# Patient Record
Sex: FEMALE | Race: BLACK OR AFRICAN AMERICAN | NOT HISPANIC OR LATINO | Employment: OTHER | URBAN - METROPOLITAN AREA
[De-identification: names, ages, dates, MRNs, and addresses within clinical notes are randomized per-mention and may not be internally consistent; named-entity substitution may affect disease eponyms.]

---

## 2017-01-06 ENCOUNTER — TELEPHONE (OUTPATIENT)
Dept: PAIN MEDICINE | Facility: CLINIC | Age: 57
End: 2017-01-06

## 2017-01-06 ENCOUNTER — OFFICE VISIT (OUTPATIENT)
Dept: INTERNAL MEDICINE | Facility: CLINIC | Age: 57
End: 2017-01-06
Payer: COMMERCIAL

## 2017-01-06 ENCOUNTER — TELEPHONE (OUTPATIENT)
Dept: INTERNAL MEDICINE | Facility: CLINIC | Age: 57
End: 2017-01-06

## 2017-01-06 VITALS
HEART RATE: 90 BPM | TEMPERATURE: 99 F | SYSTOLIC BLOOD PRESSURE: 142 MMHG | BODY MASS INDEX: 27.85 KG/M2 | WEIGHT: 163.13 LBS | HEIGHT: 64 IN | DIASTOLIC BLOOD PRESSURE: 80 MMHG

## 2017-01-06 DIAGNOSIS — R52 BODY ACHES: ICD-10-CM

## 2017-01-06 DIAGNOSIS — J32.9 SINUSITIS, UNSPECIFIED CHRONICITY, UNSPECIFIED LOCATION: Primary | ICD-10-CM

## 2017-01-06 LAB
FLUAV AG SPEC QL IA: NEGATIVE
FLUBV AG SPEC QL IA: NEGATIVE
SPECIMEN SOURCE: NORMAL

## 2017-01-06 PROCEDURE — 3079F DIAST BP 80-89 MM HG: CPT | Mod: S$GLB,,, | Performed by: INTERNAL MEDICINE

## 2017-01-06 PROCEDURE — 99999 PR PBB SHADOW E&M-EST. PATIENT-LVL III: CPT | Mod: PBBFAC,,, | Performed by: INTERNAL MEDICINE

## 2017-01-06 PROCEDURE — 99213 OFFICE O/P EST LOW 20 MIN: CPT | Mod: S$GLB,,, | Performed by: INTERNAL MEDICINE

## 2017-01-06 PROCEDURE — 87400 INFLUENZA A/B EACH AG IA: CPT | Mod: 59,PO

## 2017-01-06 PROCEDURE — 3077F SYST BP >= 140 MM HG: CPT | Mod: S$GLB,,, | Performed by: INTERNAL MEDICINE

## 2017-01-06 PROCEDURE — 1159F MED LIST DOCD IN RCRD: CPT | Mod: S$GLB,,, | Performed by: INTERNAL MEDICINE

## 2017-01-06 RX ORDER — ZOLPIDEM TARTRATE 5 MG/1
5 TABLET ORAL NIGHTLY PRN
Qty: 30 TABLET | Refills: 2 | Status: SHIPPED | OUTPATIENT
Start: 2017-01-06 | End: 2017-08-15 | Stop reason: SDUPTHER

## 2017-01-06 RX ORDER — NITROGLYCERIN 0.4 MG/1
TABLET SUBLINGUAL
Qty: 30 TABLET | Refills: 11 | Status: SHIPPED | OUTPATIENT
Start: 2017-01-06 | End: 2021-05-18

## 2017-01-06 NOTE — TELEPHONE ENCOUNTER
----- Message from Grecia Keenan sent at 1/6/2017  8:09 AM CST -----  _  1st Request  _  2nd Request  _  3rd Request        Who: Mariann Huff    Why: pt is calling she has to reschedule the procedure this morning , she woke up feeling like she has the flu    What Number to Call Back:[phone]    When to Expect a call back: (Before the end of the day)   -- if call after 3:00 call back will be tomorrow.

## 2017-01-06 NOTE — PROGRESS NOTES
Subjective:       Patient ID: Mariann Huff is a 56 y.o. female.    Chief Complaint: Cough and Generalized Body Aches    HPI     56-year-old female here for evaluation of cough and body aches.  She has a sore body.  She was supposed to have the pain management injection, but was hurting too bad.  She has a dry cough.  This has been going on since yesterday.  The body aches started yesterday as well.  She has a headache over the left eye.  She had a sinus surgery recently.  She has no sinus pressure.  No post nasal drip or rhinorrhea.  She is sneezing.  She has left sided whistling noise in her left ear.  She has an echo in her ear that started Monday.    Review of Systems    Objective:      Physical Exam   Constitutional: She is oriented to person, place, and time. She appears well-developed and well-nourished.   HENT:   Head: Normocephalic and atraumatic.   Mouth/Throat: No oropharyngeal exudate.   Eyes: EOM are normal. Pupils are equal, round, and reactive to light. Right eye exhibits no discharge. Left eye exhibits no discharge. No scleral icterus.   Neck: Normal range of motion. Neck supple. No tracheal deviation present. No thyromegaly present.   Cardiovascular: Normal rate, regular rhythm and normal heart sounds.  Exam reveals no gallop and no friction rub.    No murmur heard.  Pulmonary/Chest: Effort normal and breath sounds normal. No respiratory distress. She has no wheezes. She has no rales. She exhibits no tenderness.   Abdominal: Soft. Bowel sounds are normal. She exhibits no distension and no mass. There is no tenderness. There is no rebound and no guarding.   Musculoskeletal: Normal range of motion. She exhibits no edema or tenderness.   Neurological: She is alert and oriented to person, place, and time.   Skin: Skin is warm and dry. No rash noted. No erythema. No pallor.   Psychiatric: She has a normal mood and affect. Her behavior is normal.   Vitals reviewed.      Assessment:       1. Sinusitis,  unspecified chronicity, unspecified location    2. Body aches        Plan:       1.  Likely viral or ALLERGIC.  Recommend Flonase, over-the-counter antihistamine.  If patient worsens or Thursday of next week or no better by Monday the following week, call back for an antibiotic.  Would prescribe Augmentin.  2.  Check flu swab stat.

## 2017-02-13 ENCOUNTER — OFFICE VISIT (OUTPATIENT)
Dept: INTERNAL MEDICINE | Facility: CLINIC | Age: 57
End: 2017-02-13
Payer: COMMERCIAL

## 2017-02-13 ENCOUNTER — HOSPITAL ENCOUNTER (OUTPATIENT)
Dept: RADIOLOGY | Facility: HOSPITAL | Age: 57
Discharge: HOME | End: 2017-02-13
Attending: INTERNAL MEDICINE
Payer: COMMERCIAL

## 2017-02-13 VITALS
SYSTOLIC BLOOD PRESSURE: 130 MMHG | HEART RATE: 80 BPM | DIASTOLIC BLOOD PRESSURE: 80 MMHG | HEIGHT: 64 IN | BODY MASS INDEX: 28.34 KG/M2 | WEIGHT: 166 LBS | RESPIRATION RATE: 10 BRPM

## 2017-02-13 DIAGNOSIS — L89.510: ICD-10-CM

## 2017-02-13 DIAGNOSIS — E11.51 TYPE 2 DIABETES MELLITUS WITH DIABETIC PERIPHERAL ANGIOPATHY WITHOUT GANGRENE, WITH LONG-TERM CURRENT USE OF INSULIN: ICD-10-CM

## 2017-02-13 DIAGNOSIS — R10.11 RUQ PAIN: Primary | ICD-10-CM

## 2017-02-13 DIAGNOSIS — Z79.4 TYPE 2 DIABETES MELLITUS WITH DIABETIC PERIPHERAL ANGIOPATHY WITHOUT GANGRENE, WITH LONG-TERM CURRENT USE OF INSULIN: ICD-10-CM

## 2017-02-13 DIAGNOSIS — K22.2 SCHATZKI'S RING: ICD-10-CM

## 2017-02-13 PROCEDURE — 3079F DIAST BP 80-89 MM HG: CPT | Mod: S$GLB,,, | Performed by: INTERNAL MEDICINE

## 2017-02-13 PROCEDURE — 73610 X-RAY EXAM OF ANKLE: CPT | Mod: TC,PO,RT

## 2017-02-13 PROCEDURE — 3075F SYST BP GE 130 - 139MM HG: CPT | Mod: S$GLB,,, | Performed by: INTERNAL MEDICINE

## 2017-02-13 PROCEDURE — 3046F HEMOGLOBIN A1C LEVEL >9.0%: CPT | Mod: S$GLB,,, | Performed by: INTERNAL MEDICINE

## 2017-02-13 PROCEDURE — 4010F ACE/ARB THERAPY RXD/TAKEN: CPT | Mod: S$GLB,,, | Performed by: INTERNAL MEDICINE

## 2017-02-13 PROCEDURE — 73610 X-RAY EXAM OF ANKLE: CPT | Mod: 26,RT,, | Performed by: RADIOLOGY

## 2017-02-13 PROCEDURE — 99214 OFFICE O/P EST MOD 30 MIN: CPT | Mod: S$GLB,,, | Performed by: INTERNAL MEDICINE

## 2017-02-13 PROCEDURE — 2022F DILAT RTA XM EVC RTNOPTHY: CPT | Mod: S$GLB,,, | Performed by: INTERNAL MEDICINE

## 2017-02-13 PROCEDURE — 99999 PR PBB SHADOW E&M-EST. PATIENT-LVL III: CPT | Mod: PBBFAC,,, | Performed by: INTERNAL MEDICINE

## 2017-02-13 RX ORDER — CLINDAMYCIN HYDROCHLORIDE 300 MG/1
300 CAPSULE ORAL 3 TIMES DAILY
Qty: 42 CAPSULE | Refills: 0 | Status: SHIPPED | OUTPATIENT
Start: 2017-02-13 | End: 2017-02-27

## 2017-02-13 NOTE — PROGRESS NOTES
Subjective:       Patient ID: Mariann Huff is a 56 y.o. female.    Chief Complaint: Abdominal Pain and Ankle Pain    HPI     56-year-old female here for evaluation of a stomach ache and painful sore on her right ankle.  Her stomach has been bothering her.  She has pins sticking in it and burning sensation.  This has been going on for 2 weeks now.  This is worse when she eats.  When she eats more greasy and fried food, she has increase in pain.  She gets bloated when she eats.  She gets nausea as well.  She is on nexium 40 mg.  She had an endoscopy which showed a schatzki ring that was incompletely dilated.    Right ankle sore.  It started as a little wound and has spread.  It is on her lateral ankle.  This is painful.  She cannot put it down when she is laying in bed.  She recalls no trauma.  Her ankle started getting painful January 5th.    Review of Systems    Objective:      Physical Exam   Constitutional: She is oriented to person, place, and time. She appears well-developed and well-nourished.   HENT:   Head: Normocephalic and atraumatic.   Mouth/Throat: No oropharyngeal exudate.   Eyes: EOM are normal. Pupils are equal, round, and reactive to light. Right eye exhibits no discharge. Left eye exhibits no discharge. No scleral icterus.   Neck: Normal range of motion. Neck supple. No tracheal deviation present. No thyromegaly present.   Cardiovascular: Normal rate, regular rhythm and normal heart sounds.  Exam reveals no gallop and no friction rub.    No murmur heard.  Pulmonary/Chest: Effort normal and breath sounds normal. No respiratory distress. She has no wheezes. She has no rales. She exhibits no tenderness.   Abdominal: Soft. Bowel sounds are normal. She exhibits no distension and no mass. There is no tenderness. There is no rebound and no guarding.   Musculoskeletal: Normal range of motion. She exhibits no edema or tenderness.   Neurological: She is alert and oriented to person, place, and time.    Skin: Skin is warm and dry. No rash noted. No erythema. No pallor.        Psychiatric: She has a normal mood and affect. Her behavior is normal.   Vitals reviewed.      Assessment:       1. RUQ pain    2. Pressure sore on ankle, right, unstageable    3. Type 2 diabetes mellitus with diabetic peripheral angiopathy without gangrene, with long-term current use of insulin    4. Schatzki's ring        Plan:       1.  Check ultrasound right upper quadrant.  2.  Check CBC, ESR, CRP, x-ray of right ankle.  Clindamycin 300 mg 3 times a day ×14 days prescribed.  3.  Affected decision-making process and #2.  4.  Refer for EGD repeat

## 2017-02-14 ENCOUNTER — HOSPITAL ENCOUNTER (OUTPATIENT)
Dept: RADIOLOGY | Facility: HOSPITAL | Age: 57
Discharge: HOME | End: 2017-02-14
Attending: INTERNAL MEDICINE
Payer: COMMERCIAL

## 2017-02-14 ENCOUNTER — TELEPHONE (OUTPATIENT)
Dept: INTERNAL MEDICINE | Facility: CLINIC | Age: 57
End: 2017-02-14

## 2017-02-14 DIAGNOSIS — L03.90 CELLULITIS, UNSPECIFIED CELLULITIS SITE: Primary | ICD-10-CM

## 2017-02-14 DIAGNOSIS — R10.11 RUQ PAIN: ICD-10-CM

## 2017-02-14 PROCEDURE — 76705 ECHO EXAM OF ABDOMEN: CPT | Mod: 26,,, | Performed by: RADIOLOGY

## 2017-02-14 PROCEDURE — 76705 ECHO EXAM OF ABDOMEN: CPT | Mod: TC

## 2017-02-14 NOTE — TELEPHONE ENCOUNTER
Blood counts are normal.  Inflammatory markers are elevated as well.  Patient may take the antibiotics as prescribed.  Need to recheck labs in a couple weeks.

## 2017-02-16 ENCOUNTER — TELEPHONE (OUTPATIENT)
Dept: INTERNAL MEDICINE | Facility: CLINIC | Age: 57
End: 2017-02-16

## 2017-02-16 NOTE — TELEPHONE ENCOUNTER
----- Message from Jasbir Haney MD sent at 2/14/2017  9:55 AM CST -----  Blood counts are normal.  Inflammatory markers are elevated as well.  Patient may take the antibiotics as prescribed.  Need to recheck labs in a couple weeks.

## 2017-02-16 NOTE — TELEPHONE ENCOUNTER
----- Message from Jasbir Haney MD sent at 2/13/2017  3:42 PM CST -----  No radiographic signs of ostium myelitis.  Awaiting blood work.

## 2017-02-16 NOTE — TELEPHONE ENCOUNTER
----- Message from Jasbir Haney MD sent at 2/14/2017  9:54 AM CST -----  No gallstones identified.  If patient is still having this abdominal pain, I would want to get a HIDA scan to see if there are stones.

## 2017-02-27 ENCOUNTER — LAB VISIT (OUTPATIENT)
Dept: LAB | Facility: HOSPITAL | Age: 57
End: 2017-02-27
Attending: INTERNAL MEDICINE
Payer: COMMERCIAL

## 2017-02-27 ENCOUNTER — TELEPHONE (OUTPATIENT)
Dept: CARDIOLOGY | Facility: CLINIC | Age: 57
End: 2017-02-27

## 2017-02-27 DIAGNOSIS — I10 HTN (HYPERTENSION), BENIGN: Chronic | ICD-10-CM

## 2017-02-27 DIAGNOSIS — E78.5 HYPERLIPIDEMIA: Chronic | ICD-10-CM

## 2017-02-27 DIAGNOSIS — E11.51 TYPE 2 DIABETES MELLITUS WITH DIABETIC PERIPHERAL ANGIOPATHY WITHOUT GANGRENE: ICD-10-CM

## 2017-02-27 DIAGNOSIS — E11.42 DIABETIC PERIPHERAL NEUROPATHY ASSOCIATED WITH TYPE 2 DIABETES MELLITUS: ICD-10-CM

## 2017-02-27 LAB
ALBUMIN SERPL BCP-MCNC: 3.5 G/DL
ALP SERPL-CCNC: 79 U/L
ALT SERPL W/O P-5'-P-CCNC: 19 U/L
ANION GAP SERPL CALC-SCNC: 9 MMOL/L
AST SERPL-CCNC: 17 U/L
BILIRUB SERPL-MCNC: 0.5 MG/DL
BUN SERPL-MCNC: 14 MG/DL
CALCIUM SERPL-MCNC: 9.7 MG/DL
CHLORIDE SERPL-SCNC: 106 MMOL/L
CHOLEST/HDLC SERPL: 4.8 {RATIO}
CO2 SERPL-SCNC: 26 MMOL/L
CREAT SERPL-MCNC: 1 MG/DL
EST. GFR  (AFRICAN AMERICAN): >60 ML/MIN/1.73 M^2
EST. GFR  (NON AFRICAN AMERICAN): >60 ML/MIN/1.73 M^2
ESTIMATED AVG GLUCOSE: 249 MG/DL
GLUCOSE SERPL-MCNC: 197 MG/DL
HBA1C MFR BLD HPLC: 10.3 %
HDL/CHOLESTEROL RATIO: 20.9 %
HDLC SERPL-MCNC: 191 MG/DL
HDLC SERPL-MCNC: 40 MG/DL
LDLC SERPL CALC-MCNC: 123.8 MG/DL
NONHDLC SERPL-MCNC: 151 MG/DL
POTASSIUM SERPL-SCNC: 3.9 MMOL/L
PROT SERPL-MCNC: 7.2 G/DL
SODIUM SERPL-SCNC: 141 MMOL/L
TRIGL SERPL-MCNC: 136 MG/DL
TSH SERPL DL<=0.005 MIU/L-ACNC: 1.66 UIU/ML

## 2017-02-27 PROCEDURE — 80053 COMPREHEN METABOLIC PANEL: CPT

## 2017-02-27 PROCEDURE — 80061 LIPID PANEL: CPT

## 2017-02-27 PROCEDURE — 36415 COLL VENOUS BLD VENIPUNCTURE: CPT | Mod: PO

## 2017-02-27 PROCEDURE — 83036 HEMOGLOBIN GLYCOSYLATED A1C: CPT

## 2017-02-27 PROCEDURE — 84443 ASSAY THYROID STIM HORMONE: CPT

## 2017-02-27 RX ORDER — CANAGLIFLOZIN 300 MG/1
TABLET, FILM COATED ORAL
Qty: 30 TABLET | Refills: 0 | Status: SHIPPED | OUTPATIENT
Start: 2017-02-27 | End: 2017-03-06 | Stop reason: SDUPTHER

## 2017-02-27 NOTE — TELEPHONE ENCOUNTER
----- Message from Adelaide Aguilar MD sent at 2/27/2017  2:53 PM CST -----  Please mail patient the blood work results and inform the patient that we will discuss it in detail during the upcoming visit. Lipids look better, but diabetes is still not controlled and has not changed.

## 2017-03-06 ENCOUNTER — OFFICE VISIT (OUTPATIENT)
Dept: CARDIOLOGY | Facility: CLINIC | Age: 57
End: 2017-03-06
Payer: COMMERCIAL

## 2017-03-06 ENCOUNTER — LAB VISIT (OUTPATIENT)
Dept: LAB | Facility: HOSPITAL | Age: 57
End: 2017-03-06
Attending: INTERNAL MEDICINE
Payer: COMMERCIAL

## 2017-03-06 VITALS
BODY MASS INDEX: 28.32 KG/M2 | HEART RATE: 72 BPM | HEIGHT: 64 IN | SYSTOLIC BLOOD PRESSURE: 140 MMHG | DIASTOLIC BLOOD PRESSURE: 78 MMHG | WEIGHT: 165.88 LBS

## 2017-03-06 DIAGNOSIS — E66.09 NON MORBID OBESITY DUE TO EXCESS CALORIES: Chronic | ICD-10-CM

## 2017-03-06 DIAGNOSIS — I25.83 CORONARY ARTERY DISEASE DUE TO LIPID RICH PLAQUE: Primary | Chronic | ICD-10-CM

## 2017-03-06 DIAGNOSIS — I10 HTN (HYPERTENSION), BENIGN: Chronic | ICD-10-CM

## 2017-03-06 DIAGNOSIS — E78.2 MIXED HYPERLIPIDEMIA: Chronic | ICD-10-CM

## 2017-03-06 DIAGNOSIS — G47.30 SLEEP APNEA, UNSPECIFIED TYPE: Chronic | ICD-10-CM

## 2017-03-06 DIAGNOSIS — E11.42 DIABETIC PERIPHERAL NEUROPATHY ASSOCIATED WITH TYPE 2 DIABETES MELLITUS: ICD-10-CM

## 2017-03-06 DIAGNOSIS — I65.23 CAROTID STENOSIS, BILATERAL: Chronic | ICD-10-CM

## 2017-03-06 DIAGNOSIS — Z91.199 NON COMPLIANCE WITH MEDICAL TREATMENT: Chronic | ICD-10-CM

## 2017-03-06 DIAGNOSIS — I25.10 CORONARY ARTERY DISEASE DUE TO LIPID RICH PLAQUE: Primary | Chronic | ICD-10-CM

## 2017-03-06 DIAGNOSIS — T82.855S CORONARY STENT RESTENOSIS, SEQUELA: ICD-10-CM

## 2017-03-06 DIAGNOSIS — L03.90 CELLULITIS, UNSPECIFIED CELLULITIS SITE: ICD-10-CM

## 2017-03-06 LAB
BASOPHILS # BLD AUTO: 0.01 K/UL
BASOPHILS NFR BLD: 0.1 %
CRP SERPL-MCNC: 9.8 MG/L
DIFFERENTIAL METHOD: ABNORMAL
EOSINOPHIL # BLD AUTO: 0.2 K/UL
EOSINOPHIL NFR BLD: 2.5 %
ERYTHROCYTE [DISTWIDTH] IN BLOOD BY AUTOMATED COUNT: 13.9 %
ERYTHROCYTE [SEDIMENTATION RATE] IN BLOOD BY WESTERGREN METHOD: 21 MM/HR
HCT VFR BLD AUTO: 36.3 %
HGB BLD-MCNC: 11.2 G/DL
LYMPHOCYTES # BLD AUTO: 2.9 K/UL
LYMPHOCYTES NFR BLD: 36.1 %
MCH RBC QN AUTO: 25.5 PG
MCHC RBC AUTO-ENTMCNC: 30.9 %
MCV RBC AUTO: 83 FL
MONOCYTES # BLD AUTO: 0.6 K/UL
MONOCYTES NFR BLD: 7.7 %
NEUTROPHILS # BLD AUTO: 4.3 K/UL
NEUTROPHILS NFR BLD: 53.4 %
PLATELET # BLD AUTO: 305 K/UL
PMV BLD AUTO: 11.4 FL
RBC # BLD AUTO: 4.39 M/UL
WBC # BLD AUTO: 8.07 K/UL

## 2017-03-06 PROCEDURE — 99999 PR PBB SHADOW E&M-EST. PATIENT-LVL III: CPT | Mod: PBBFAC,,, | Performed by: INTERNAL MEDICINE

## 2017-03-06 PROCEDURE — 85651 RBC SED RATE NONAUTOMATED: CPT

## 2017-03-06 PROCEDURE — 36415 COLL VENOUS BLD VENIPUNCTURE: CPT | Mod: PO

## 2017-03-06 PROCEDURE — 4010F ACE/ARB THERAPY RXD/TAKEN: CPT | Mod: S$GLB,,, | Performed by: INTERNAL MEDICINE

## 2017-03-06 PROCEDURE — 85025 COMPLETE CBC W/AUTO DIFF WBC: CPT

## 2017-03-06 PROCEDURE — 3078F DIAST BP <80 MM HG: CPT | Mod: S$GLB,,, | Performed by: INTERNAL MEDICINE

## 2017-03-06 PROCEDURE — 3046F HEMOGLOBIN A1C LEVEL >9.0%: CPT | Mod: S$GLB,,, | Performed by: INTERNAL MEDICINE

## 2017-03-06 PROCEDURE — 99214 OFFICE O/P EST MOD 30 MIN: CPT | Mod: S$GLB,,, | Performed by: INTERNAL MEDICINE

## 2017-03-06 PROCEDURE — 2022F DILAT RTA XM EVC RTNOPTHY: CPT | Mod: S$GLB,,, | Performed by: INTERNAL MEDICINE

## 2017-03-06 PROCEDURE — 3077F SYST BP >= 140 MM HG: CPT | Mod: S$GLB,,, | Performed by: INTERNAL MEDICINE

## 2017-03-06 PROCEDURE — 86140 C-REACTIVE PROTEIN: CPT

## 2017-03-06 PROCEDURE — 1160F RVW MEDS BY RX/DR IN RCRD: CPT | Mod: S$GLB,,, | Performed by: INTERNAL MEDICINE

## 2017-03-06 RX ORDER — BLOOD-GLUCOSE CONTROL, NORMAL
EACH MISCELLANEOUS
Status: ON HOLD | COMMUNITY
Start: 2017-01-09 | End: 2019-07-02 | Stop reason: HOSPADM

## 2017-03-06 RX ORDER — PEN NEEDLE, DIABETIC 31 GX3/16"
NEEDLE, DISPOSABLE MISCELLANEOUS
COMMUNITY
Start: 2017-02-24 | End: 2018-10-26

## 2017-03-06 NOTE — PROGRESS NOTES
Subjective:   Patient ID:  Mariann Huff is a 56 y.o. female who presents for follow-up of Hypertension      HPI: Patient has no specific complaints.  She is exercising on the treadmill 2 times 15 minutes a day. She admits to dietary indiscretions. She is unable to loose charly. Diabetes is uncontrolled.    Lab Results   Component Value Date     02/27/2017    K 3.9 02/27/2017     02/27/2017    CO2 26 02/27/2017    BUN 14 02/27/2017    CREATININE 1.0 02/27/2017     (H) 02/27/2017    HGBA1C 10.3 (H) 02/27/2017    MG 2.6 10/09/2015    AST 17 02/27/2017    ALT 19 02/27/2017    ALBUMIN 3.5 02/27/2017    PROT 7.2 02/27/2017    BILITOT 0.5 02/27/2017    WBC 7.87 02/13/2017    HGB 11.7 (L) 02/13/2017    HCT 36.7 (L) 02/13/2017    MCV 82 02/13/2017     02/13/2017    INR 0.9 02/17/2014    TSH 1.656 02/27/2017    CHOL 191 02/27/2017    HDL 40 02/27/2017    LDLCALC 123.8 02/27/2017    TRIG 136 02/27/2017       Review of Systems   Constitution: Positive for malaise/fatigue.   HENT: Negative.    Eyes: Negative.    Cardiovascular: Positive for dyspnea on exertion. Negative for chest pain, claudication, irregular heartbeat, leg swelling, near-syncope, palpitations and syncope.   Respiratory: Negative.  Negative for cough, shortness of breath, snoring and wheezing.    Endocrine: Negative.  Negative for cold intolerance, heat intolerance, polydipsia, polyphagia and polyuria.   Skin: Negative.    Musculoskeletal: Positive for arthritis, back pain, muscle cramps and muscle weakness.   Gastrointestinal: Negative.    Genitourinary: Negative.    Neurological: Positive for numbness and paresthesias.   Psychiatric/Behavioral: Negative.         Current Outpatient Prescriptions:     ACCU-CHEK EDIN PLUS METER Misc, , Disp: , Rfl:     amlodipine (NORVASC) 10 MG tablet, TAKE 1 TABLET (10 MG TOTAL) BY MOUTH ONCE DAILY., Disp: 30 tablet, Rfl: 11    aspirin 81 mg Tab, Take 81 mg by mouth. 1 Tablet Oral Every day,  "Disp: , Rfl:     atorvastatin (LIPITOR) 40 MG tablet, TAKE 1 TABLET (40 MG TOTAL) BY MOUTH ONCE DAILY., Disp: 30 tablet, Rfl: 11    BD INSULIN PEN NEEDLE UF MINI 31 x 3/16 " Ndle, USE 4 TIMES A DAY, Disp: 200 each, Rfl: 11    blood sugar diagnostic (ACCU-CHEK EDIN PLUS TEST STRP) Strp, TEST BLOOD SUGARS 4 TIMES DAILY, Disp: 150 strip, Rfl: 11    EFFIENT 10 mg Tab, TAKE 1 TABLET (10 MG TOTAL) BY MOUTH ONCE DAILY., Disp: 30 tablet, Rfl: 11    esomeprazole (NEXIUM) 40 MG capsule, Take 1 capsule (40 mg total) by mouth before breakfast., Disp: 30 capsule, Rfl: 2    fenofibrate micronized (LOFIBRA) 134 MG Cap, TAKE 1 CAPSULE (134 MG TOTAL) BY MOUTH BEFORE BREAKFAST., Disp: 30 capsule, Rfl: 10    insulin glargine, TOUJEO, (TOUJEO SOLOSTAR) 300 unit/mL (1.5 mL) InPn pen, Inject 84 Units into the skin once daily., Disp: 6 Syringe, Rfl: 11    insulin lispro (HUMALOG KWIKPEN) 100 unit/mL InPn pen, 30 U in am, 40 units noon, 30 units pm; 150-200 +2, 201-250 + 4, 251-300 + 6, 301-350 +8, >350 +10 and call MD, Disp: 2 Box, Rfl: 11    INVOKANA 300 mg Tab, Take 1 tablet by mouth once daily., Disp: 30 tablet, Rfl: 11    lancets 30 gauge Misc, , Disp: , Rfl:     losartan (COZAAR) 100 MG tablet, Take 1 tablet (100 mg total) by mouth once daily., Disp: 30 tablet, Rfl: 11    metoprolol succinate (TOPROL-XL) 100 MG 24 hr tablet, TAKE 1 TABLET (100 MG TOTAL) BY MOUTH ONCE DAILY., Disp: 30 tablet, Rfl: 11    nitroGLYCERIN (NITROSTAT) 0.4 MG SL tablet, Take one every 2-3 min till chestpain relief for 3 times and if still no relief, call MD or come to ED, Disp: 30 tablet, Rfl: 11    omega-3 acid ethyl esters (LOVAZA) 1 gram capsule, Take 1 g by mouth once daily. , Disp: , Rfl:     pen needle, diabetic (BD ULTRA-FINE GRABIEL PEN NEEDLES) 32 gauge x 5/32" Ndle, For use with insulin pens daily 4 times a day, Disp: 100 each, Rfl: 11    SURE COMFORT PEN NEEDLE 31 gauge x 5/16" Ndle, , Disp: , Rfl:     tramadol (ULTRAM) 50 mg " "tablet, Take 1 tablet (50 mg total) by mouth 3 (three) times daily as needed for Pain., Disp: 60 tablet, Rfl: 1    zolpidem (AMBIEN) 5 MG Tab, Take 1 tablet (5 mg total) by mouth nightly as needed., Disp: 30 tablet, Rfl: 2    Objective:   Physical Exam   Constitutional: She is oriented to person, place, and time. She appears well-developed and well-nourished.   BP (!) 140/78  Pulse 72  Ht 5' 4" (1.626 m)  Wt 75.3 kg (165 lb 14.3 oz)  BMI 28.48 kg/m2     HENT:   Head: Normocephalic.   Eyes: Pupils are equal, round, and reactive to light.   Neck: Normal range of motion. Neck supple. Carotid bruit is not present. Thyromegaly present.   Cardiovascular: Normal rate, regular rhythm, normal heart sounds and intact distal pulses.  Exam reveals no gallop and no friction rub.    No murmur heard.  Pulses:       Carotid pulses are 2+ on the right side, and 2+ on the left side.       Radial pulses are 2+ on the right side, and 2+ on the left side.        Femoral pulses are 2+ on the right side, and 2+ on the left side.       Popliteal pulses are 2+ on the right side, and 2+ on the left side.        Dorsalis pedis pulses are 2+ on the right side, and 2+ on the left side.        Posterior tibial pulses are 2+ on the right side, and 2+ on the left side.   Pulmonary/Chest: Effort normal and breath sounds normal. No respiratory distress. She has no wheezes. She has no rales. She exhibits no tenderness.   Abdominal: Soft. Bowel sounds are normal.   obese   Musculoskeletal: Normal range of motion. She exhibits no edema.   Neurological: She is alert and oriented to person, place, and time.   Skin: Skin is warm and dry.   Psychiatric: She has a normal mood and affect.   Nursing note and vitals reviewed.      Assessment and Plan:     Problem List Items Addressed This Visit        Cardiology Problems    HTN (hypertension), benign (Chronic)    Hyperlipidemia (Chronic)    Relevant Orders    Hemoglobin A1c    TSH    Comprehensive " metabolic panel    Lipid panel    CAD (coronary artery disease) - Primary (Chronic)    Relevant Orders    Hemoglobin A1c    TSH    Comprehensive metabolic panel    Lipid panel    Carotid stenosis, bilateral (Chronic)    Relevant Orders    Hemoglobin A1c    TSH    Comprehensive metabolic panel    Lipid panel       Other    Sleep apnea (Chronic)    Obesity (Chronic)    Non compliance with medical treatment (Chronic)    Coronary stent restenosis    Diabetic peripheral neuropathy associated with type 2 diabetes mellitus    Relevant Orders    Hemoglobin A1c    TSH    Comprehensive metabolic panel    Lipid panel        Compliance reinforced.  TSH is normal, but neck exam suggests goiter.  Defer work up to PCP.  Return in about 6 months (around 9/6/2017).

## 2017-03-06 NOTE — MR AVS SNAPSHOT
[facility]   [facility]  [place] [address]  Phone: [phone]                  Mariann Huff   3/6/2017 1:30 PM   Office Visit    Description:  Female : 1960   Provider:  Adelaide Aguilar MD   Department:  [facility]           Reason for Visit     Hypertension                To Do List           Future Appointments        Provider Department Dept Phone    3/10/2017 7:30 AM Rosmery Fisher, APRN,ANP-C [facility] Endocrinology [phone]    2017 11:00 AM BAP XROP  [facility] [phone]    2017 11:45 AM Aime Laurent MD [facility] [phone]    2017 2:30 PM Tl Abreu MD [facility] - Pain Management [phone]      Your Future Surgeries/Procedures     2017   Surgery with Tl Abreu MD   [facility]    [address] [facility] [address] [phone]            2017   Surgery with Tl Abreu MD   [facility]    [address] [facility] [address] [phone]              Goals (5 Years of Data)     None      [facility]     [facility] Care [facility] -  Assistance  Registered nurses in the [facility] provide clinical advisement, health education, appointment booking, and other advisory services.  Call for this free service at [phone].             Medications           Message regarding Medications     Verify the changes and/or additions to your medication regime listed below are the same as discussed with your clinician today.  If any of these changes or additions are incorrect, please notify your healthcare provider.             Verify that the below list of medications is an accurate representation of the medications you are currently taking.  If none reported, the list may be blank. If incorrect, please contact your healthcare  "provider. Carry this list with you in case of emergency.           Current Medications     ACCU-CHEK EDIN PLUS METER Misc     amlodipine (NORVASC) 10 MG tablet TAKE 1 TABLET (10 MG TOTAL) BY MOUTH ONCE DAILY.    aspirin 81 mg Tab Take 81 mg by mouth. 1 Tablet Oral Every day    atorvastatin (LIPITOR) 40 MG tablet TAKE 1 TABLET (40 MG TOTAL) BY MOUTH ONCE DAILY.    BD INSULIN PEN NEEDLE UF MINI 31 x 3/16 " Ndle USE 4 TIMES A DAY    blood sugar diagnostic (ACCU-CHEK EDIN PLUS TEST STRP) Strp TEST BLOOD SUGARS 4 TIMES DAILY    EFFIENT 10 mg Tab TAKE 1 TABLET (10 MG TOTAL) BY MOUTH ONCE DAILY.    esomeprazole (NEXIUM) 40 MG capsule Take 1 capsule (40 mg total) by mouth before breakfast.    fenofibrate micronized (LOFIBRA) 134 MG Cap TAKE 1 CAPSULE (134 MG TOTAL) BY MOUTH BEFORE BREAKFAST.    insulin glargine, TOUJEO, (TOUJEO SOLOSTAR) 300 unit/mL (1.5 mL) InPn pen Inject 84 Units into the skin once daily.    insulin lispro (HUMALOG KWIKPEN) 100 unit/mL InPn pen 30 U in am, 40 units noon, 30 units pm; 150-200 +2, 201-250 + 4, 251-300 + 6, 301-350 +8, >350 +10 and call MD [place] 300 mg Tab Take 1 tablet by mouth once daily.    lancets 30 gauge Misc     losartan (COZAAR) 100 MG tablet Take 1 tablet (100 mg total) by mouth once daily.    metoprolol succinate (TOPROL-XL) 100 MG 24 hr tablet TAKE 1 TABLET (100 MG TOTAL) BY MOUTH ONCE DAILY.    nitroGLYCERIN (NITROSTAT) 0.4 MG SL tablet Take one every 2-3 min till chestpain relief for 3 times and if still no relief, call MD or come to ED    omega-3 acid ethyl esters (LOVAZA) 1 gram capsule Take 1 g by mouth once daily.     pen needle, diabetic (BD ULTRA-FINE GRABIEL PEN NEEDLES) 32 gauge x 5/32" Ndle For use with insulin pens daily 4 times a day    SURE COMFORT PEN NEEDLE 31 gauge x 5/16" Ndle     tramadol (ULTRAM) 50 mg tablet Take 1 tablet (50 mg total) by mouth 3 (three) times daily as needed for Pain.    zolpidem (AMBIEN) 5 MG Tab Take 1 tablet (5 mg total) by mouth " "nightly as needed.           Clinical Reference Information           Your Vitals Were     BP Pulse Height Weight BMI    140/78 72 5' 4" (1.626 m) 75.3 kg (165 lb 14.3 oz) 28.48 kg/m2      Blood Pressure          Most Recent Value    Right Arm BP - Sitting  140/78    Left Arm BP - Sitting  136/74    BP  (!)  140/78      Allergies as of 3/6/2017     No Known Allergies      Immunizations Administered on Date of Encounter - 3/6/2017     None      [facility] Sign-Up     Activating your [facility] account is as easy as 1-2-3!     1) Visit [email or web address], select Sign Up Now, enter this activation code and your date of birth, then select Next.  Activation code not generated  Current Patient Portal Status: Account disabled      2) Create a username and password to use when you visit [facility] in the future and select a security question in case you lose your password and select Next.    3) Enter your e-mail address and click Sign Up!    Additional Information  If you have questions, please e-mail [email or web address] or call [phone] to talk to our [facility] staff. Remember, [facility] is NOT to be used for urgent needs. For medical emergencies, dial 911.         Language Assistance Services     ATTENTION: Language assistance services are available, free of charge. Please call [phone].      ATENCIÓN: Si habla español, tiene a maxwell disposición servicios gratuitos de asistencia lingüística. Llame al [phone].     CHÚ Ý: N?u b?n nói Ti?ng Vi?t, có các d?ch v? h? tr? ngôn ng? mi?n phí dành cho b?n. G?i s? [phone].         [facility] complies with applicable Federal civil rights laws and does not discriminate on the basis of race, color, national origin, age, disability, or sex.        "

## 2017-03-10 ENCOUNTER — OFFICE VISIT (OUTPATIENT)
Dept: ENDOCRINOLOGY | Facility: CLINIC | Age: 57
End: 2017-03-10
Payer: COMMERCIAL

## 2017-03-10 VITALS
WEIGHT: 165.69 LBS | SYSTOLIC BLOOD PRESSURE: 157 MMHG | BODY MASS INDEX: 28.29 KG/M2 | HEIGHT: 64 IN | HEART RATE: 77 BPM | DIASTOLIC BLOOD PRESSURE: 84 MMHG

## 2017-03-10 DIAGNOSIS — E11.42 DIABETIC PERIPHERAL NEUROPATHY ASSOCIATED WITH TYPE 2 DIABETES MELLITUS: ICD-10-CM

## 2017-03-10 DIAGNOSIS — I65.23 CAROTID STENOSIS, BILATERAL: Chronic | ICD-10-CM

## 2017-03-10 DIAGNOSIS — Z79.4 TYPE 2 DIABETES MELLITUS WITH DIABETIC PERIPHERAL ANGIOPATHY WITHOUT GANGRENE, WITH LONG-TERM CURRENT USE OF INSULIN: Primary | Chronic | ICD-10-CM

## 2017-03-10 DIAGNOSIS — I25.83 CORONARY ARTERY DISEASE DUE TO LIPID RICH PLAQUE: Chronic | ICD-10-CM

## 2017-03-10 DIAGNOSIS — E11.51 TYPE 2 DIABETES MELLITUS WITH DIABETIC PERIPHERAL ANGIOPATHY WITHOUT GANGRENE, WITH LONG-TERM CURRENT USE OF INSULIN: Primary | Chronic | ICD-10-CM

## 2017-03-10 DIAGNOSIS — E78.2 MIXED HYPERLIPIDEMIA: Chronic | ICD-10-CM

## 2017-03-10 DIAGNOSIS — E11.9 TYPE 2 DIABETES MELLITUS WITH INSULIN DEFICIENCY: ICD-10-CM

## 2017-03-10 DIAGNOSIS — I25.10 CORONARY ARTERY DISEASE DUE TO LIPID RICH PLAQUE: Chronic | ICD-10-CM

## 2017-03-10 DIAGNOSIS — I10 HTN (HYPERTENSION), BENIGN: Chronic | ICD-10-CM

## 2017-03-10 PROCEDURE — 99214 OFFICE O/P EST MOD 30 MIN: CPT | Mod: S$GLB,,, | Performed by: NURSE PRACTITIONER

## 2017-03-10 PROCEDURE — 3077F SYST BP >= 140 MM HG: CPT | Mod: S$GLB,,, | Performed by: NURSE PRACTITIONER

## 2017-03-10 PROCEDURE — 3046F HEMOGLOBIN A1C LEVEL >9.0%: CPT | Mod: S$GLB,,, | Performed by: NURSE PRACTITIONER

## 2017-03-10 PROCEDURE — 99999 PR PBB SHADOW E&M-EST. PATIENT-LVL III: CPT | Mod: PBBFAC,,, | Performed by: NURSE PRACTITIONER

## 2017-03-10 PROCEDURE — 4010F ACE/ARB THERAPY RXD/TAKEN: CPT | Mod: S$GLB,,, | Performed by: NURSE PRACTITIONER

## 2017-03-10 PROCEDURE — 2022F DILAT RTA XM EVC RTNOPTHY: CPT | Mod: S$GLB,,, | Performed by: NURSE PRACTITIONER

## 2017-03-10 PROCEDURE — 3079F DIAST BP 80-89 MM HG: CPT | Mod: S$GLB,,, | Performed by: NURSE PRACTITIONER

## 2017-03-10 NOTE — MR AVS SNAPSHOT
[facility] - Endocrinology  [address]  [facility] [address]  Phone: [phone]                  Mariann Huff   3/10/2017 7:30 AM   Office Visit    Description:  Female : 1960   Provider:  SEBASTIAN Granado,ANP-C   Department:  [facility] - Endocrinology           Reason for Visit     Diabetes Mellitus           Diagnoses this Visit        Comments    Type 2 diabetes mellitus with diabetic peripheral angiopathy without gangrene, with long-term current use of insulin    -  Primary     Diabetic peripheral neuropathy associated with type 2 diabetes mellitus         Coronary artery disease due to lipid rich plaque         HTN (hypertension), benign         Mixed hyperlipidemia         Carotid stenosis, bilateral         Type 2 diabetes mellitus with insulin deficiency                To Do List           Future Appointments        Provider Department Dept Phone    2017 11:00 AM [facility] XROP  [facility] [phone]    2017 11:45 AM Aime Laurent MD [facility] - Spine Services [phone]    2017 2:30 PM Tl Abreu MD [facility] - Pain Management [phone]    2017 8:00 AM LAB, [facility] [place] [facility] [phone]    2017 7:30 AM SEBASTIAN Granado,ANP-C [facility] [phone]      Your Future Surgeries/Procedures     2017   Surgery with Tl Abreu MD   [facility]    [address] [facility] [address] [phone]            2017   Surgery with Tl Abreu MD   [facility]    [address]  [place] [address] [phone]              Goals (5 Years of Data)     None      Follow-Up and Disposition     Return in about 3 months (around 6/10/2017).    Follow-up and Disposition History      [facility]     [facility] -  Assistance  Registered nurses in the  "[facility] provide clinical advisement, health education, appointment booking, and other advisory services.  Call for this free service at [phone].             Medications           Message regarding Medications     Verify the changes and/or additions to your medication regime listed below are the same as discussed with your clinician today.  If any of these changes or additions are incorrect, please notify your healthcare provider.             Verify that the below list of medications is an accurate representation of the medications you are currently taking.  If none reported, the list may be blank. If incorrect, please contact your healthcare provider. Carry this list with you in case of emergency.           Current Medications     ACCU-CHEK EDIN PLUS METER Misc     amlodipine (NORVASC) 10 MG tablet TAKE 1 TABLET (10 MG TOTAL) BY MOUTH ONCE DAILY.    aspirin 81 mg Tab Take 81 mg by mouth. 1 Tablet Oral Every day    atorvastatin (LIPITOR) 40 MG tablet TAKE 1 TABLET (40 MG TOTAL) BY MOUTH ONCE DAILY.    BD INSULIN PEN NEEDLE UF MINI 31 x 3/16 " Ndle USE 4 TIMES A DAY    blood sugar diagnostic (ACCU-CHEK EDIN PLUS TEST STRP) Strp TEST BLOOD SUGARS 4 TIMES DAILY    EFFIENT 10 mg Tab TAKE 1 TABLET (10 MG TOTAL) BY MOUTH ONCE DAILY.    esomeprazole (NEXIUM) 40 MG capsule Take 1 capsule (40 mg total) by mouth before breakfast.    fenofibrate micronized (LOFIBRA) 134 MG Cap TAKE 1 CAPSULE (134 MG TOTAL) BY MOUTH BEFORE BREAKFAST.    insulin glargine, TOUJEO, (TOUJEO SOLOSTAR) 300 unit/mL (1.5 mL) InPn pen Inject 84 Units into the skin once daily.    insulin lispro (HUMALOG KWIKPEN) 100 unit/mL InPn pen 30 U in am, 40 units noon, 30 units pm; 150-200 +2, 201-250 + 4, 251-300 + 6, 301-350 +8, >350 +10 and call MD OAKES 300 mg Tab Take 1 tablet by mouth once daily.    lancets 30 gauge Misc     losartan (COZAAR) 100 MG tablet Take 1 tablet (100 mg total) by mouth once daily.    metoprolol succinate " "(TOPROL-XL) 100 MG 24 hr tablet TAKE 1 TABLET (100 MG TOTAL) BY MOUTH ONCE DAILY.    nitroGLYCERIN (NITROSTAT) 0.4 MG SL tablet Take one every 2-3 min till chestpain relief for 3 times and if still no relief, call MD or come to ED    omega-3 acid ethyl esters (LOVAZA) 1 gram capsule Take 1 g by mouth once daily.     pen needle, diabetic (BD ULTRA-FINE GRABIEL PEN NEEDLES) 32 gauge x 5/32" Ndle For use with insulin pens daily 4 times a day    SURE COMFORT PEN NEEDLE 31 gauge x 5/16" Ndle     tramadol (ULTRAM) 50 mg tablet Take 1 tablet (50 mg total) by mouth 3 (three) times daily as needed for Pain.    zolpidem (AMBIEN) 5 MG Tab Take 1 tablet (5 mg total) by mouth nightly as needed.           Clinical Reference Information           Your Vitals Were     BP Pulse Height Weight BMI    157/84 (BP Location: Right arm, Patient Position: Sitting) 77 5' 4" (1.626 m) 75.2 kg (165 lb 11.2 oz) 28.44 kg/m2      Blood Pressure          Most Recent Value    BP  (!)  157/84      Allergies as of 3/10/2017     No Known Allergies      Immunizations Administered on Date of Encounter - 3/10/2017     None      Orders Placed During Today's Visit     Future Labs/Procedures Expected by Expires    C-peptide  3/10/2017 3/10/2018    Glucose, random  3/10/2017 3/10/2018    Hemoglobin A1c  3/10/2017 3/10/2018    Microalbumin/creatinine urine ratio  3/10/2017 5/9/2018      [facility] Sign-Up     Activating your [facility] account is as easy as 1-2-3!     1) Visit [email or web address], select Sign Up Now, enter this activation code and your date of birth, then select Next.  Activation code not generated  Current Patient Portal Status: Account disabled      2) Create a username and password to use when you visit [facility] in the future and select a security question in case you lose your password and select Next.    3) Enter your e-mail address and click Sign Up!    Additional Information  If you have questions, please e-mail [email or web address] or call " [phone] to talk to our [facility] staff. Remember, [facility] is NOT to be used for urgent needs. For medical emergencies, dial 911.         Language Assistance Services     ATTENTION: Language assistance services are available, free of charge. Please call [phone].      ATENCIÓN: Si habla cira, tiene a maxwell disposición servicios gratuitos de asistencia lingüística. Llame al [phone].     SANJEEV Ý: N?u b?n nói Ti?ng Vi?t, có các d?ch v? h? tr? ngôn ng? mi?n phí dành cho b?n. G?i s? [phone].         [facility] complies with applicable Federal civil rights laws and does not discriminate on the basis of race, color, national origin, age, disability, or sex.

## 2017-03-10 NOTE — PROGRESS NOTES
"CC: The primary encounter diagnosis was Type 2 diabetes mellitus with diabetic peripheral angiopathy without gangrene, with long-term current use of insulin. Diagnoses of Diabetic peripheral neuropathy associated with type 2 diabetes mellitus, Coronary artery disease due to lipid rich plaque, HTN (hypertension), benign, Mixed hyperlipidemia, and Carotid stenosis, bilateral were also pertinent to this visit.     HPI: Mrs. Mariann Huff was diagnosed with Type 2 in 2002. Was asymptomatic at the time of diagnosis. Diagnosed based on bloodwork. Started on orals, but then started insulin in 2002. She did not tolerate metformin (GI complaints). No DM hospitalizations. Currently on MDI.      At her last visit, a c-peptide was ordered. This has returned with a level of 0.7.     No missed doses of insulin.     Humalog taken 15 minutes before meals.     Monitoring BG "when I think about it". Reports last time checked was Monday. At that time her reading was 151 fasting.     Had right wrist surgery on 9/1/16 and is having another surgery on her wrist on the 23rd of this month.     Appetite "too good". States she snacks on chips, soft drinks, and candy. Drinks 2-3 12 oz regular sodas daily.     Reports she is starting the Mediterranean diet again soon.     CURRENT DIABETIC MEDS:Toujeo 82 units QAM,  Humalog 30-40-30 units AC plus correction scale 25 ISF with goal of 150, Invokana 300 mg PO daily    Last Eye Exam: 2016 (history of bilateral cataract surgery) and reports she is due again soon.   Last Podiatry Exam: December 2015    REVIEW OF SYSTEMS  General: no fatigue or weakness.   Eyes: left eye water and blurry vision at times.   Cardiac: no palpitations or chest pain.   Respiratory: no dyspnea or cough.  GI: good appetite; occasional nausea.   Skin: no rashes, itching, or insulin injection site reactions; (+) rotation of insulin injection sites.    Neuro: no numbness or tingling.  End: (+) c/o polyuria; no polydipsia or " "polyphagia.   GYN: no c/o vaginal itching or discharge; no yeast infections.    MS: reports (+) chronic right calf pain when standing or walking. Pain continually described as a "constant hurt".     Vital Signs  BP (!) 157/84 (BP Location: Right arm, Patient Position: Sitting)  Pulse 77  Ht 5' 4" (1.626 m)  Wt 75.2 kg (165 lb 11.2 oz)  BMI 28.44 kg/m2    Hemoglobin A1C   Date Value Ref Range Status   02/27/2017 10.3 (H) 4.5 - 6.2 % Final     Comment:     According to ADA guidelines, hemoglobin A1C <7.0% represents  optimal control in non-pregnant diabetic patients.  Different  metrics may apply to specific populations.   Standards of Medical Care in Diabetes - 2016.  For the purpose of screening for the presence of diabetes:  <5.7%     Consistent with the absence of diabetes  5.7-6.4%  Consistent with increasing risk for diabetes   (prediabetes)  >or=6.5%  Consistent with diabetes  Currently no consensus exists for use of hemoglobin A1C  for diagnosis of diabetes for children.     11/18/2016 10.3 (H) 4.5 - 6.2 % Final     Comment:     According to ADA guidelines, hemoglobin A1C <7.0% represents  optimal control in non-pregnant diabetic patients.  Different  metrics may apply to specific populations.   Standards of Medical Care in Diabetes - 2016.  For the purpose of screening for the presence of diabetes:  <5.7%     Consistent with the absence of diabetes  5.7-6.4%  Consistent with increasing risk for diabetes   (prediabetes)  >or=6.5%  Consistent with diabetes  Currently no consensus exists for use of hemoglobin A1C  for diagnosis of diabetes for children.     08/22/2016 11.3 (H) 4.5 - 6.2 % Final     Comment:     According to ADA guidelines, hemoglobin A1C <7.0% represents  optimal control in non-pregnant diabetic patients.  Different  metrics may apply to specific populations.   Standards of Medical Care in Diabetes - 2016.  For the purpose of screening for the presence of diabetes:  <5.7%     Consistent with " the absence of diabetes  5.7-6.4%  Consistent with increasing risk for diabetes   (prediabetes)  >or=6.5%  Consistent with diabetes  Currently no consensus exists for use of hemoglobin A1C  for diagnosis of diabetes for children.         Chemistry        Component Value Date/Time     02/27/2017 0815    K 3.9 02/27/2017 0815     02/27/2017 0815    CO2 26 02/27/2017 0815    BUN 14 02/27/2017 0815    CREATININE 1.0 02/27/2017 0815     (H) 02/27/2017 0815        Component Value Date/Time    CALCIUM 9.7 02/27/2017 0815    ALKPHOS 79 02/27/2017 0815    AST 17 02/27/2017 0815    ALT 19 02/27/2017 0815    BILITOT 0.5 02/27/2017 0815          Lab Results   Component Value Date    CHOL 191 02/27/2017    CHOL 184 08/09/2016    CHOL 188 01/12/2016     Lab Results   Component Value Date    HDL 40 02/27/2017    HDL 33 (L) 08/09/2016    HDL 42 01/12/2016     Lab Results   Component Value Date    LDLCALC 123.8 02/27/2017    LDLCALC 113.8 08/09/2016    LDLCALC 120.4 01/12/2016     Lab Results   Component Value Date    TRIG 136 02/27/2017    TRIG 186 (H) 08/09/2016    TRIG 128 01/12/2016     Lab Results   Component Value Date    CHOLHDL 20.9 02/27/2017    CHOLHDL 17.9 (L) 08/09/2016    CHOLHDL 22.3 01/12/2016       Lab Results   Component Value Date    TSH 1.656 02/27/2017     Component      Latest Ref Rng 5/13/2016   Vit D, 1,25-Dihydroxy      20 - 79 pg/mL 83 (H)     Lab Results   Component Value Date    MICALBCREAT 24.2 06/06/2014     Component      Latest Ref Rng 8/22/2016   C-Peptide      0.9 - 5.5 ng/mL 0.7 (L)       PHYSICAL EXAMINATION  Constitutional: Appears well, no distress  Neck: Supple, trachea midline.   Respiratory: CTA without wheezes, even and unlabored.  Cardiovascular: RRR; bialteral carotid bruit; no murmurs. Pending carotid ultrasound today  Lymph: DP pulses  2+ bilaterally; no edema.   Skin: warm and dry; no injection site reactions, no acanthosis nigracans observed.  Feet: see footwear from  note dated 8/31/2016. Appropriate footwear.    Assessment/Plan  1. Type 2 diabetes mellitus with diabetic peripheral angiopathy without gangrene:   DM uncontrolled.   Continue insulin doses and PO doses as documented above.   Avoid drinking regular soft drinks.   Discussed insulin needs may decrease when Mediterranean diet starts.   Monitor readings at least 3x/day.   Logs to next visit.    2. Diabetic peripheral neuropathy associated with type 2 diabetes mellitus:  Improve BG readings.    3.  Type 2 diabetes with insulin deficiency: c-peptide 0.7 with BG of 107. Recheck with next visit.    4. Coronary artery disease due to lipid rich plaque: Managed by Cardiology. Last seen on Monday, March 6, 2017 and followed every 6 months. Discussed improving BG control.    5. HTN (hypertension), benign: Continue Amlodipine, Losartan, Metoprolol. Managed by Cardiology.    6. Hyperlipidemia: Continue Atorvastatin, Lofibra, and Lovaza. Managed by Cardiology.    7. Bilateral Carotid Stenosis: Improve BG control. Had US in August 2016 and is followed by Cardiology for this.      FOLLOW UP  Return in about 3 months (around 6/10/2017).     Orders Placed This Encounter   Procedures    Hemoglobin A1c     Standing Status:   Future     Standing Expiration Date:   3/10/2018    Microalbumin/creatinine urine ratio     Standing Status:   Future     Standing Expiration Date:   5/9/2018     Order Specific Question:   Specimen Source     Answer:   Urine

## 2017-03-20 ENCOUNTER — TELEPHONE (OUTPATIENT)
Dept: ENDOCRINOLOGY | Facility: CLINIC | Age: 57
End: 2017-03-20

## 2017-03-20 NOTE — TELEPHONE ENCOUNTER
Spoke to Sol at [facility] and faxed over the most recent labs to fax number [phone].

## 2017-03-20 NOTE — TELEPHONE ENCOUNTER
----- Message from Fiorella Rosario sent at 3/20/2017  8:20 AM CDT -----  Contact: YUNIER      [phone]    [place]   - patient is preparing for surgery  -  Copy of her most recent labs are needed  Thanks,

## 2017-03-21 ENCOUNTER — TELEPHONE (OUTPATIENT)
Dept: CARDIOLOGY | Facility: CLINIC | Age: 57
End: 2017-03-21

## 2017-03-21 NOTE — TELEPHONE ENCOUNTER
Pt notified. Pt verbalized understanding. Pt stated that she stopped taking her medications on Saturday then she said that she stopped taking them on Friday. I advised pt again that if she has not been off Effient, Fish oil and ASA for 7 days that Dr. Aguilar recommends that she postpones the surgery. This note faxed to Dr. Nathan Yeboah's office at [phone] and to  at [phone].

## 2017-03-21 NOTE — TELEPHONE ENCOUNTER
Pt called stating that she is having wrist surgery on Wednesday by Dr. Yeboah at . Pt stated that she was told to call and get surgery clearance. Can you clear her by chart? Pt's lov was on 3/6/17. Please advise.

## 2017-03-27 RX ORDER — ESOMEPRAZOLE MAGNESIUM 40 MG/1
CAPSULE, DELAYED RELEASE ORAL
Qty: 30 CAPSULE | Refills: 2 | Status: SHIPPED | OUTPATIENT
Start: 2017-03-27 | End: 2017-09-19 | Stop reason: SDUPTHER

## 2017-04-13 ENCOUNTER — TELEPHONE (OUTPATIENT)
Dept: PAIN MEDICINE | Facility: CLINIC | Age: 57
End: 2017-04-13

## 2017-04-13 NOTE — TELEPHONE ENCOUNTER
Spoke with patient regarding rescheduling procedure to a sooner date and time of arrival. Patient stated that she will keep her appt on 4/21/17.

## 2017-04-17 ENCOUNTER — HOSPITAL ENCOUNTER (OUTPATIENT)
Dept: RADIOLOGY | Facility: HOSPITAL | Age: 57
Discharge: HOME | End: 2017-04-17
Attending: INTERNAL MEDICINE
Payer: COMMERCIAL

## 2017-04-17 ENCOUNTER — OFFICE VISIT (OUTPATIENT)
Dept: INTERNAL MEDICINE | Facility: CLINIC | Age: 57
End: 2017-04-17
Payer: COMMERCIAL

## 2017-04-17 VITALS
SYSTOLIC BLOOD PRESSURE: 172 MMHG | HEART RATE: 68 BPM | WEIGHT: 162.5 LBS | DIASTOLIC BLOOD PRESSURE: 80 MMHG | RESPIRATION RATE: 12 BRPM | BODY MASS INDEX: 27.74 KG/M2 | HEIGHT: 64 IN | TEMPERATURE: 98 F

## 2017-04-17 DIAGNOSIS — Z79.4 TYPE 2 DIABETES MELLITUS WITH DIABETIC PERIPHERAL ANGIOPATHY WITHOUT GANGRENE, WITH LONG-TERM CURRENT USE OF INSULIN: Chronic | ICD-10-CM

## 2017-04-17 DIAGNOSIS — M54.42 ACUTE MIDLINE LOW BACK PAIN WITH BILATERAL SCIATICA: ICD-10-CM

## 2017-04-17 DIAGNOSIS — M54.41 ACUTE MIDLINE LOW BACK PAIN WITH BILATERAL SCIATICA: ICD-10-CM
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
## 2017-04-17 DIAGNOSIS — E11.51 TYPE 2 DIABETES MELLITUS WITH DIABETIC PERIPHERAL ANGIOPATHY WITHOUT GANGRENE, WITH LONG-TERM CURRENT USE OF INSULIN: Chronic | ICD-10-CM

## 2017-04-17 DIAGNOSIS — M54.9 BACK PAIN: ICD-10-CM

## 2017-04-17 PROCEDURE — 3046F HEMOGLOBIN A1C LEVEL >9.0%: CPT | Mod: S$GLB,,, | Performed by: INTERNAL MEDICINE

## 2017-04-17 PROCEDURE — 3079F DIAST BP 80-89 MM HG: CPT | Mod: S$GLB,,, | Performed by: INTERNAL MEDICINE

## 2017-04-17 PROCEDURE — 72100 X-RAY EXAM L-S SPINE 2/3 VWS: CPT | Mod: 26,,, | Performed by: RADIOLOGY

## 2017-04-17 PROCEDURE — 99214 OFFICE O/P EST MOD 30 MIN: CPT | Mod: 25,S$GLB,, | Performed by: INTERNAL MEDICINE

## 2017-04-17 PROCEDURE — 1160F RVW MEDS BY RX/DR IN RCRD: CPT | Mod: S$GLB,,, | Performed by: INTERNAL MEDICINE

## 2017-04-17 PROCEDURE — 99999 PR PBB SHADOW E&M-EST. PATIENT-LVL IV: CPT | Mod: PBBFAC,,, | Performed by: INTERNAL MEDICINE

## 2017-04-17 PROCEDURE — 72100 X-RAY EXAM L-S SPINE 2/3 VWS: CPT | Mod: TC,PO

## 2017-04-17 PROCEDURE — 4010F ACE/ARB THERAPY RXD/TAKEN: CPT | Mod: S$GLB,,, | Performed by: INTERNAL MEDICINE

## 2017-04-17 PROCEDURE — 3077F SYST BP >= 140 MM HG: CPT | Mod: S$GLB,,, | Performed by: INTERNAL MEDICINE

## 2017-04-17 PROCEDURE — 90471 IMMUNIZATION ADMIN: CPT | Mod: S$GLB,,, | Performed by: INTERNAL MEDICINE

## 2017-04-17 PROCEDURE — 90714 TD VACC NO PRESV 7 YRS+ IM: CPT | Mod: S$GLB,,, | Performed by: INTERNAL MEDICINE

## 2017-04-17 RX ORDER — INSULIN ASPART 100 [IU]/ML
INJECTION, SOLUTION INTRAVENOUS; SUBCUTANEOUS
Qty: 20 ML | Refills: 11 | Status: SHIPPED | OUTPATIENT
Start: 2017-04-17 | End: 2017-06-16 | Stop reason: SDUPTHER

## 2017-04-17 RX ORDER — NAPROXEN 500 MG/1
500 TABLET ORAL 2 TIMES DAILY
Qty: 60 TABLET | Refills: 1 | Status: SHIPPED | OUTPATIENT
Start: 2017-04-17 | End: 2017-06-16

## 2017-04-17 RX ORDER — CYCLOBENZAPRINE HCL 10 MG
10 TABLET ORAL 3 TIMES DAILY PRN
Qty: 45 TABLET | Refills: 0 | Status: SHIPPED | OUTPATIENT
Start: 2017-04-17 | End: 2017-05-07 | Stop reason: SDUPTHER
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
## 2017-04-28 ENCOUNTER — TELEPHONE (OUTPATIENT)
Dept: INTERNAL MEDICINE | Facility: CLINIC | Age: 57
End: 2017-04-28

## 2017-04-28 NOTE — TELEPHONE ENCOUNTER
----- Message from Jasbir Haney MD sent at 4/17/2017 11:37 AM CDT -----  No acute fractures noted.  You do have loss of disc height at L5-S1.  This is contributing to symptoms.  Take medication as prescribed during appointment.

## 2017-05-08 RX ORDER — CYCLOBENZAPRINE HCL 10 MG
TABLET ORAL
Qty: 45 TABLET | Refills: 5 | Status: SHIPPED | OUTPATIENT
Start: 2017-05-08 | End: 2018-07-23 | Stop reason: SDUPTHER

## 2017-05-12 DIAGNOSIS — Z12.31 OTHER SCREENING MAMMOGRAM: ICD-10-CM

## 2017-05-22 ENCOUNTER — HOSPITAL ENCOUNTER (OUTPATIENT)
Dept: RADIOLOGY | Facility: HOSPITAL | Age: 57
Discharge: HOME | End: 2017-05-22
Attending: INTERNAL MEDICINE
Payer: COMMERCIAL

## 2017-05-22 ENCOUNTER — OFFICE VISIT (OUTPATIENT)
Dept: INTERNAL MEDICINE | Facility: CLINIC | Age: 57
End: 2017-05-22
Payer: COMMERCIAL

## 2017-05-22 ENCOUNTER — TELEPHONE (OUTPATIENT)
Dept: INTERNAL MEDICINE | Facility: CLINIC | Age: 57
End: 2017-05-22

## 2017-05-22 VITALS
HEART RATE: 67 BPM | DIASTOLIC BLOOD PRESSURE: 67 MMHG | TEMPERATURE: 98 F | WEIGHT: 164.88 LBS | BODY MASS INDEX: 28.15 KG/M2 | HEIGHT: 64 IN | RESPIRATION RATE: 18 BRPM | SYSTOLIC BLOOD PRESSURE: 142 MMHG

## 2017-05-22 DIAGNOSIS — S92.901A FRACTURE, FOOT, RIGHT, CLOSED, INITIAL ENCOUNTER: Primary | ICD-10-CM

## 2017-05-22 DIAGNOSIS — M79.671 RIGHT FOOT PAIN: ICD-10-CM

## 2017-05-22 DIAGNOSIS — M79.671 RIGHT FOOT PAIN: Primary | ICD-10-CM

## 2017-05-22 PROCEDURE — 99999 PR PBB SHADOW E&M-EST. PATIENT-LVL III: CPT | Mod: PBBFAC,,, | Performed by: INTERNAL MEDICINE

## 2017-05-22 PROCEDURE — 3078F DIAST BP <80 MM HG: CPT | Mod: S$GLB,,, | Performed by: INTERNAL MEDICINE

## 2017-05-22 PROCEDURE — 3077F SYST BP >= 140 MM HG: CPT | Mod: S$GLB,,, | Performed by: INTERNAL MEDICINE

## 2017-05-22 PROCEDURE — 73630 X-RAY EXAM OF FOOT: CPT | Mod: TC,PO,RT

## 2017-05-22 PROCEDURE — 99213 OFFICE O/P EST LOW 20 MIN: CPT | Mod: S$GLB,,, | Performed by: INTERNAL MEDICINE

## 2017-05-22 PROCEDURE — 1160F RVW MEDS BY RX/DR IN RCRD: CPT | Mod: S$GLB,,, | Performed by: INTERNAL MEDICINE

## 2017-05-22 PROCEDURE — 73630 X-RAY EXAM OF FOOT: CPT | Mod: 26,RT,, | Performed by: RADIOLOGY
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
## 2017-05-23 DIAGNOSIS — I10 HTN (HYPERTENSION), BENIGN: Chronic | ICD-10-CM

## 2017-05-23 RX ORDER — LOSARTAN POTASSIUM 100 MG/1
100 TABLET ORAL DAILY
Qty: 30 TABLET | Refills: 11 | Status: SHIPPED | OUTPATIENT
Start: 2017-05-23 | End: 2018-07-20 | Stop reason: SDUPTHER

## 2017-06-02 ENCOUNTER — OFFICE VISIT (OUTPATIENT)
Dept: PODIATRY | Facility: CLINIC | Age: 57
End: 2017-06-02
Payer: COMMERCIAL

## 2017-06-02 DIAGNOSIS — S92.356A CLOSED NONDISPLACED FRACTURE OF FIFTH METATARSAL BONE, UNSPECIFIED LATERALITY, INITIAL ENCOUNTER: Primary | ICD-10-CM

## 2017-06-02 PROCEDURE — 99999 PR PBB SHADOW E&M-EST. PATIENT-LVL III: CPT | Mod: PBBFAC,,, | Performed by: PODIATRIST

## 2017-06-02 PROCEDURE — 99214 OFFICE O/P EST MOD 30 MIN: CPT | Mod: 57,S$GLB,, | Performed by: PODIATRIST

## 2017-06-02 PROCEDURE — 28470 CLTX METATARSAL FX WO MNP EA: CPT | Mod: RT,S$GLB,, | Performed by: PODIATRIST

## 2017-06-02 NOTE — PATIENT INSTRUCTIONS
Toe and Metatarsal Fractures (Broken Toes)        The structure of the foot is complex, consisting of bones, muscles, tendons and other soft tissues. Of the 28 bones in the foot, 19 are toe bones (phalanges) and metatarsal bones (the long bones in the midfoot). Fractures of the toe and metatarsal bones are common and require evaluation by a specialist. A foot and ankle surgeon should be seen for proper diagnosis and treatment, even if initial treatment has been received in an emergency room.    What Is a Fracture?  A fracture is a break in the bone. Fractures can be divided into two categories: traumatic fractures and stress fractures.     Traumatic fractures (also called acute fractures) are caused by a direct blow or impact, such as seriously stubbing your toe. Traumatic fractures can be displaced or nondisplaced. If the fracture is displaced, the bone is broken in such a way that it has changed in position (malpositioned).    Signs and symptoms of a traumatic fracture include:    You may hear a sound at the time of the break.  Pinpoint pain (pain at the place of impact) at the time the fracture occurs and perhaps for a few hours later, but often the pain goes away after several hours.  Crooked or abnormal appearance of the toe.  Bruising and swelling the next day.     It is not true that if you can walk on it, its not broken. Evaluation by a foot and ankle surgeon is always recommended.    Stress fractures are tiny hairline breaks usually caused by repetitive stress. Stress fractures often afflict athletes who, for example, too rapidly increase their running mileage. They can also be caused by an abnormal foot structure, deformities or osteoporosis. Improper footwear may also lead to stress fractures. Stress fractures should not be ignored. They require proper medical attention to heal correctly.    Symptoms of stress fractures include:    Pain with or after normal activity  Pain that goes away when resting  and then returns when standing or during activity  Pinpoint pain (pain at the site of the fracture) when touched  Swelling but no bruising     Consequences of Improper Treatment  Some people say that the doctor cant do anything for a broken bone in the foot. This is usually not true. In fact, if a fractured toe or metatarsal bone is not treated correctly, serious complications may develop. For example:    A deformity in the bony architecture, which may limit the ability to move the foot or cause difficulty in fitting shoes.  Arthritis, which may be caused by a fracture in a joint (the juncture where two bones meet), or may be a result of angular deformities that develop when a displaced fracture is severe or has not been properly corrected.  Chronic pain and deformity.  Nonunion, or failure to heal, can lead to subsequent surgery or chronic pain.     Treatment of Toe Fractures  Fractures of the toe bones are almost always traumatic fractures. Treatment for traumatic fractures depends on the break itself and may include these options:    Rest. Sometimes rest is all that is needed to treat a traumatic fracture of the toe.  Splinting. The toe may be fitted with a splint to keep it in a fixed position.  Rigid or stiff-soled shoe. Wearing a stiff-soled shoe protects the toe and helps keep it properly positioned. Use of a postoperative shoe or bootwalker is also helpful.  David taping the fractured toe to another toe is sometimes appropriate, but in other cases, it may be harmful.  Surgery. If the break is badly displaced or if the joint is affected, surgery may be necessary. Surgery often involves the use of fixation devices, such as pins.     Treatment of Metatarsal Fractures  Breaks in the metatarsal bones may be either stress or traumatic fractures. Certain kinds of fractures of the metatarsal bones present unique challenges.    For example, sometimes a fracture of the first metatarsal bone (behind the big toe) can  lead to arthritis. Since the big toe is used so frequently and bears more weight than other toes, arthritis in that area can make it painful to walk, bend or even stand.    Another type of break, called a Mckeon fracture, occurs at the base of the fifth metatarsal bone (behind the little toe). It is often misdiagnosed as an ankle sprain, and misdiagnosis can have serious consequences since sprains and fractures require different treatments. Your foot and ankle surgeon is an expert in correctly identifying these conditions as well as other problems of the foot.    Treatment of metatarsal fractures depends on the type and extent of the fracture and may include:    Rest. Sometimes rest is the only treatment needed to promote healing of a stress or traumatic fracture of a metatarsal bone.  Avoid the offending activity. Because stress fractures result from repetitive stress, it is important to avoid the activity that led to the fracture. Crutches or a wheelchair are sometimes required to offload weight from the foot to give it time to heal.  Immobilization, casting or rigid shoe. A stiff-soled shoe or other form of immobilization may be used to protect the fractured bone while it is healing. Use of a postoperative shoe or bootwalker is also helpful.  Surgery. Some traumatic fractures of the metatarsal bones require surgery, especially if the break is badly displaced.  Follow-up care. Your foot and ankle surgeon will provide instructions for care following surgical or nonsurgical treatment. Physical therapy, exercises and rehabilitation may be included in a schedule for return to normal activities.          Bone Healing  How Does a Bone Heal?    All broken bones go through the same healing process. This is true whether a bone has been cut as part of a surgical procedure or fractured through an injury.     The bone healing process has three overlapping stages: inflammation, bone production and bone  remodeling.        Inflammation starts immediately after the bone is fractured and lasts for several days. When the bone is fractured, there is bleeding into the area, leading to inflammation and clotting of blood at the fracture site. This provides the initial structural stability and framework for producing new bone.        Bone production begins when the clotted blood formed by inflammation is replaced with fibrous tissue and cartilage (known as soft callus). As healing progresses, the soft callus is replaced with hard bone (known as hard callus), which is visible on x-rays several weeks after the fracture.        Bone remodeling, the final phase of bone healing, goes on for several months. In remodeling, bone continues to form and becomes compact, returning to its original shape. In addition, blood circulation in the area improves. Once adequate bone healing has occurred, weightbearing (such as standing or walking) encourages bone remodeling.?  How Long Does Bone Healing Take?   Bone generally takes 6 to 12 weeks to heal to a significant degree. In general, children's bones heal faster than those of adults. The foot and ankle surgeon will determine when the patient is ready to bear weight on the area. This will depend on the location and severity of the fracture, the type of surgical procedure performed and other considerations.    What Helps Promote Bone Healing?  If a bone will be cut during a planned surgical procedure, some steps can be taken pre- and postoperatively to help optimize healing. The surgeon may offer advice on diet and nutritional supplements that are essential to bone growth. Smoking cessation and adequate control of blood sugar levels in people living with diabetes are important. Smoking and high glucose levels interfere with bone healing.     For all patients with fractured bones, immobilization is a critical part of treatment because any movement of bone fragments slows down the initial  healing process. Depending on the type of fracture or surgical procedure, the surgeon may use some form of fixation (such as screws, plates or wires) on the fractured bone and/or a cast to keep the bone from moving. During the immobilization period, weightbearing is restricted as instructed by the surgeon.     Once the bone is adequately healed, physical therapy often plays a key role in rehabilitation. An exercise program designed for the patient can help in regaining strength and balance and can assist in returning to normal activities.    What Can Hinder Bone Healing?   A wide variety of factors can slow down the healing process. These include:    Movement of the bone fragments; weightbearing too soon  Smoking, which constricts the blood vessels and decreases circulation  Medical conditions, such as diabetes, hormone-related problems or vascular disease  Some medications, such as corticosteroids and other immunosuppressants  Fractures that are severe, complicated or become infected  Advanced age  Poor nutrition or impaired metabolism  Low levels of calcium and vitamin D     How Can Slow Healing Be Treated?   If the bone is not healing as well as expected or fails to heal, the foot and ankle surgeon can choose from a variety of treatment options to enhance bone growth, such as continued immobilization for a longer period, bone stimulation or surgery with bone grafting or use of bone growth proteins      Understanding Fifth Metatarsal Fracture    A fifth metatarsal fracture is a type of broken bone in your foot. You have 5 metatarsals. They are the middle bones in your feet, between your toes and your anklebones (tarsals). The fifth metatarsal connects your smallest toe to your ankle. These bones help with arch support and balance.     How to say it  met-ah-TAHR-sal   What causes a fifth metatarsal fracture?  A direct blow to the bone is often the cause of a fracture of the fifth metatarsal. That may happen if you  drop a heavy object on your foot or land wrong on your foot or ankle. Twisting activities can also break the bone. Pivoting while playing basketball is one example.  Repeatedly placing too much stress on the bone can also cause a fracture of the fifth metatarsal. This is called a stress fracture. People who do physical activities like dancing or running tend to be more prone to stress fractures.  Symptoms of a fifth metatarsal fracture  Sudden pain along the outside of your foot is the main symptom. A stress fracture may develop more slowly. You may feel chronic pain for a period of time. Your foot may also swell up and bruise. You may have trouble walking.  Treatment for a fifth metatarsal fracture  Treatment for this type of fracture depends on where the bone is broken and how severe the breakage is. Healing can take up to several months. Treatment may include:  · Cold therapy. Putting ice on the area may reduce swelling and pain, especially in the first few days after injury.  · Elevation. Propping up the foot so it is above the level of your heart may ease swelling.  · Prescription or over-the-counter pain medicines. These help reduce pain and swelling.  · Immobilization. Devices such as a splint, cast, or walking boot can protect the bone and ease pain. They can help keep the bone in place so it heals properly. You may need to avoid putting any weight on the broken bone for a period of time. Severe fractures usually need a longer limit on weight-bearing activities.  · Stretching and strengthening exercises. Certain exercises can help you regain flexibility and strength in your foot.  · Surgery. You usually will not need surgery. But you may need it if the bone is broken into 2 or more pieces and is not aligned (displaced), doesnt heal properly, or takes a long time to heal.  Possible complications of a fifth metatarsal fracture  · The bone doesnt heal correctly  · Acute compartment syndrome. This is when  pressure builds up in the muscles of the foot and affects blood flow.     When to call your healthcare provider  Call your healthcare provider right away if you have any of these:  · Fever of 100.4°F (38°C) or higher, or as directed  · Symptoms that dont get better, or get worse  · Numbness or coldness in your foot  · Toe nails that turn blue or grey in color  · New symptoms   Date Last Reviewed: 3/10/2016  © 2994-7621 [employer] [address] [place] [address]. All rights reserved. This information is not intended as a substitute for professional medical care. Always follow your healthcare professional's instructions.

## 2017-06-02 NOTE — PROGRESS NOTES
Subjective:    Patient ID: Mariann Huff is a 57 y.o. female.    Chief Complaint: Foot Pain (right foot)      HPI:   Mariann is a 57 y.o. female who presents to the podiatry clinic  with complaint of  right foot pain. Onset of the symptoms was several weeks ago. Precipitating event: none known. Current symptoms include: ability to bear weight, but with some pain, swelling and worsening symptoms after a period of activity. Aggravating factors: any weight bearing. Symptoms have gradually worsened. Patient has had no prior foot problems. Evaluation to date: none. Treatment to date: none. Patients rates pain 7/10 on pain scale.      HPI    Last Podiatry Enc: Visit date not found  Last Enc w/ Me: Visit date not found    Past Medical History:   Diagnosis Date    Anticoagulant long-term use     Asthma     Back pain     CAD (coronary artery disease)     s/p stentimg  (2), (1)    Carotid artery stenosis     Diabetes mellitus type 2 in obese     HTN (hypertension), benign     Hyperlipidemia     Keloid cicatrix     NPDR (nonproliferative diabetic retinopathy) 2015    NSTEMI (non-ST elevated myocardial infarction)     Nuclear sclerosis - Right Eye 3/18/2014    Primary localized osteoarthrosis, lower leg 2014    Sleep apnea      Past Surgical History:   Procedure Laterality Date    CARDIAC CATHETERIZATION      cataract extraction left eye      cataracts       SECTION, LOW TRANSVERSE      CORONARY ANGIOPLASTY      EXCISION TURBINATE, SUBMUCOUS      HAND SURGERY Left     HAND SURGERY Right     torn ligament repair/ Dr. Yeboah    HYSTERECTOMY      left foot surgery      left wrist surgery      NASAL SEPTUM SURGERY  5/7/15    rt elbow surgery      S/P LAD COATED STENT  2010    6 total     S/P OM1 STENT  2003    SINUS SURGERY      F.E.S.S.    TUBAL LIGATION       Social History     Social History    Marital status:      Spouse name: Shamir    Number of  "children: 2    Years of education: N/A     Occupational History    cafeteria [employer]     Social History Main Topics    Smoking status: Never Smoker    Smokeless tobacco: Never Used    Alcohol use No    Drug use: No    Sexual activity: Yes     Partners: Male     Birth control/ protection: Post-menopausal      Comment:      Other Topics Concern    Are You Pregnant Or Think You May Be? No    Breast-Feeding No     Social History Narrative    . 2 children.          Current Outpatient Prescriptions:     ACCU-CHEK EDIN PLUS METER Misc, , Disp: , Rfl:     amlodipine (NORVASC) 10 MG tablet, TAKE 1 TABLET (10 MG TOTAL) BY MOUTH ONCE DAILY., Disp: 30 tablet, Rfl: 11    aspirin 81 mg Tab, Take 81 mg by mouth. 1 Tablet Oral Every day, Disp: , Rfl:     atorvastatin (LIPITOR) 40 MG tablet, TAKE 1 TABLET (40 MG TOTAL) BY MOUTH ONCE DAILY., Disp: 30 tablet, Rfl: 11    BD INSULIN PEN NEEDLE UF MINI 31 x 3/16 " Ndle, USE 4 TIMES A DAY, Disp: 200 each, Rfl: 11    blood sugar diagnostic (ACCU-CHEK EDIN PLUS TEST STRP) Strp, TEST BLOOD SUGARS 4 TIMES DAILY, Disp: 150 strip, Rfl: 11    cyclobenzaprine (FLEXERIL) 10 MG tablet, TAKE 1 TABLET BY MOUTH 3 TIMES A DAY AS NEEDED FOR MUSCLE SPASMS. NO WORKING OR DRIVING ON THIS MED, Disp: 45 tablet, Rfl: 5    EFFIENT 10 mg Tab, TAKE 1 TABLET (10 MG TOTAL) BY MOUTH ONCE DAILY., Disp: 30 tablet, Rfl: 11    esomeprazole (NEXIUM) 40 MG capsule, TAKE 1 CAPSULE (40 MG TOTAL) BY MOUTH BEFORE BREAKFAST., Disp: 30 capsule, Rfl: 2    fenofibrate micronized (LOFIBRA) 134 MG Cap, TAKE 1 CAPSULE (134 MG TOTAL) BY MOUTH BEFORE BREAKFAST., Disp: 30 capsule, Rfl: 10    insulin aspart (NOVOLOG) 100 unit/mL InPn pen, 30 U in am, 40 units noon, 30 units pm; 150-200 +2, 201-250 + 4, 251-300 + 6, 301-350 +8, >350 +10 and call MD, Disp: 20 mL, Rfl: 11    insulin glargine, TOUJEO, (TOUJEO SOLOSTAR) 300 " "unit/mL (1.5 mL) InPn pen, Inject 84 Units into the skin once daily., Disp: 6 Syringe, Rfl: 11    INVOKANA 300 mg Tab, Take 1 tablet by mouth once daily., Disp: 30 tablet, Rfl: 11    lancets 30 gauge Misc, , Disp: , Rfl:     losartan (COZAAR) 100 MG tablet, TAKE 1 TABLET (100 MG TOTAL) BY MOUTH ONCE DAILY., Disp: 30 tablet, Rfl: 11    metoprolol succinate (TOPROL-XL) 100 MG 24 hr tablet, TAKE 1 TABLET (100 MG TOTAL) BY MOUTH ONCE DAILY., Disp: 30 tablet, Rfl: 11    naproxen (NAPROSYN) 500 MG tablet, Take 1 tablet (500 mg total) by mouth 2 (two) times daily., Disp: 60 tablet, Rfl: 1    nitroGLYCERIN (NITROSTAT) 0.4 MG SL tablet, Take one every 2-3 min till chestpain relief for 3 times and if still no relief, call MD or come to ED, Disp: 30 tablet, Rfl: 11    omega-3 acid ethyl esters (LOVAZA) 1 gram capsule, Take 1 g by mouth once daily. , Disp: , Rfl:     pen needle, diabetic (BD ULTRA-FINE GRABIEL PEN NEEDLES) 32 gauge x 5/32" Ndle, For use with insulin pens daily 4 times a day, Disp: 100 each, Rfl: 11    SURE COMFORT PEN NEEDLE 31 gauge x 5/16" Ndle, , Disp: , Rfl:     tramadol (ULTRAM) 50 mg tablet, Take 1 tablet (50 mg total) by mouth 3 (three) times daily as needed for Pain., Disp: 60 tablet, Rfl: 1    zolpidem (AMBIEN) 5 MG Tab, Take 1 tablet (5 mg total) by mouth nightly as needed., Disp: 30 tablet, Rfl: 2  Review of patient's allergies indicates:  No Known Allergies    ROS  ROS Constitutional:  General Appearance: well nourished  Vascular: negative for cramps, edema and bruising  Musculoskeletal: negative for joint paint and joint edema  Skin: negative for rashes and lesions  Neurological: negative for burning, tingling and numbness  Gastrointestinal: negative for stomach pain, nausea and vomiting        Objective:        There were no vitals taken for this visit.    Ortho/SPM Exam  Physical Exam  LE exam con't:  V: DP 2/4, PT 2/4, CRT< 3s to all digits tested.     N: SILT in SP/DP/T/Maxi/Saph " distributions.    Derm: Skin intact. No erythema, edema or ecchymosis.     Ortho:  +Motor EHL/FHL/TA/GA   Compartments soft/compressible. No pain on passive stretch of big toe. No calf  pain.        Assessment:     Imaging / Labs:    X-ray Foot Complete Right    Result Date: 5/22/2017  There appears to be an incomplete fracture through the mid fifth metatarsal in area of the patient's pain.  Mild degenerative changes are seen to first MP joint.  Some degenerative changes seen at the bases of the first metatarsal and calcaneal spurs are noted.     Fracture through the mid fifth metatarsal Electronically signed by: Florin Cooley MD Date:     05/22/17 Time:    13:40             1. Closed nondisplaced fracture of fifth metatarsal bone, unspecified laterality, initial encounter - Right Foot          Plan:       Orders Placed This Encounter    X-Ray Foot Complete Right     Procedures  .   Mariann was seen today for foot pain.    Diagnoses and all orders for this visit:    Closed nondisplaced fracture of fifth metatarsal bone, unspecified laterality, initial encounter - Right Foot  -     X-Ray Foot Complete Right; Future        Mariann Huff is a 57 y.o. female presenting w/   1. Closed nondisplaced fracture of fifth metatarsal bone, unspecified laterality, initial encounter - Right Foot        -pt seen, evaluated, and managed  -dx discussed in detail. All questions/concerns addressed  -all tx options discussed. All alternatives, risks, benefits of all txs discussed  -The patient was educated regarding the above diagnosis. We discussed conservative care options regarding shoe wear and/or padding.  -rxs dispensed: none  -xr on way out--> will review at nxt visit  -NWB in CAM RLE  -CAM boot dispensed  -crutches and training administered    Return in about 6 weeks (around 7/14/2017).

## 2017-06-02 NOTE — LETTER
June 2, 2017      Jasbir Haney MD  [address] [facility]  [place] [address]           [place] [facility]  [address] [facility] [address]  Phone: [phone]          Patient: Mariann Huff   MR Number: [id number]   YOB: 1960   Date of Visit: 6/2/2017       Dear Dr. Jasbir Haney:    Thank you for referring Mariann Huff to me for evaluation. Attached you will find relevant portions of my assessment and plan of care.    If you have questions, please do not hesitate to call me. I look forward to following Mariann Huff along with you.    Sincerely,    Mathew Rajan Jr., DPM    Enclosure  CC:  No Recipients    If you would like to receive this communication electronically, please contact [email or web address] or [phone] to request more information on [employer] Link access.    For providers and/or their staff who would like to refer a patient to [facility], please contact us through our one-stop-shop provider referral line, Esmer Campbell, at [phone].    If you feel you have received this communication in error or would no longer like to receive these types of communications, please e-mail [email or web address]

## 2017-06-05 DIAGNOSIS — E11.51 TYPE 2 DIABETES MELLITUS WITH DIABETIC PERIPHERAL ANGIOPATHY WITHOUT GANGRENE, WITHOUT LONG-TERM CURRENT USE OF INSULIN: ICD-10-CM

## 2017-06-05 RX ORDER — CANAGLIFLOZIN 300 MG/1
300 TABLET, FILM COATED ORAL DAILY
Qty: 30 TABLET | Refills: 11 | Status: SHIPPED | OUTPATIENT
Start: 2017-06-05 | End: 2018-07-06 | Stop reason: SDUPTHER

## 2017-06-05 NOTE — TELEPHONE ENCOUNTER
----- Message from Fiorella Rosario sent at 6/5/2017 11:46 AM CDT -----  Contact: pt  [phone]  Phillip  -  Pt needs an refill on several medication INVOKANA 300 mg Tab  , call back number [phone]  Thanks,

## 2017-06-08 DIAGNOSIS — M54.42 ACUTE BACK PAIN WITH SCIATICA, LEFT: Primary | ICD-10-CM

## 2017-06-12 ENCOUNTER — OFFICE VISIT (OUTPATIENT)
Dept: INTERNAL MEDICINE | Facility: CLINIC | Age: 57
End: 2017-06-12
Payer: COMMERCIAL

## 2017-06-12 ENCOUNTER — LAB VISIT (OUTPATIENT)
Dept: LAB | Facility: HOSPITAL | Age: 57
End: 2017-06-12
Attending: INTERNAL MEDICINE
Payer: COMMERCIAL

## 2017-06-12 VITALS
BODY MASS INDEX: 27.77 KG/M2 | HEIGHT: 64 IN | WEIGHT: 162.69 LBS | TEMPERATURE: 98 F | DIASTOLIC BLOOD PRESSURE: 80 MMHG | SYSTOLIC BLOOD PRESSURE: 142 MMHG | HEART RATE: 84 BPM

## 2017-06-12 DIAGNOSIS — Z11.59 NEED FOR HEPATITIS C SCREENING TEST: ICD-10-CM

## 2017-06-12 DIAGNOSIS — M79.604 RIGHT LEG PAIN: Primary | ICD-10-CM

## 2017-06-12 DIAGNOSIS — Z79.4 TYPE 2 DIABETES MELLITUS WITH DIABETIC PERIPHERAL ANGIOPATHY WITHOUT GANGRENE, WITH LONG-TERM CURRENT USE OF INSULIN: Chronic | ICD-10-CM

## 2017-06-12 DIAGNOSIS — S62.336D CLOSED DISPLACED FRACTURE OF NECK OF FIFTH METACARPAL BONE OF RIGHT HAND WITH ROUTINE HEALING, SUBSEQUENT ENCOUNTER: ICD-10-CM

## 2017-06-12 DIAGNOSIS — E11.51 TYPE 2 DIABETES MELLITUS WITH DIABETIC PERIPHERAL ANGIOPATHY WITHOUT GANGRENE, WITH LONG-TERM CURRENT USE OF INSULIN: Chronic | ICD-10-CM

## 2017-06-12 LAB
C PEPTIDE SERPL-MCNC: 1.36 NG/ML
ESTIMATED AVG GLUCOSE: 237 MG/DL
HBA1C MFR BLD HPLC: 9.9 %
HCV AB SERPL QL IA: NEGATIVE

## 2017-06-12 PROCEDURE — 86803 HEPATITIS C AB TEST: CPT

## 2017-06-12 PROCEDURE — 84681 ASSAY OF C-PEPTIDE: CPT

## 2017-06-12 PROCEDURE — 99214 OFFICE O/P EST MOD 30 MIN: CPT | Mod: 25,S$GLB,, | Performed by: INTERNAL MEDICINE

## 2017-06-12 PROCEDURE — 36415 COLL VENOUS BLD VENIPUNCTURE: CPT | Mod: PO

## 2017-06-12 PROCEDURE — 96372 THER/PROPH/DIAG INJ SC/IM: CPT | Mod: S$GLB,,, | Performed by: INTERNAL MEDICINE

## 2017-06-12 PROCEDURE — 83036 HEMOGLOBIN GLYCOSYLATED A1C: CPT

## 2017-06-12 PROCEDURE — 99999 PR PBB SHADOW E&M-EST. PATIENT-LVL III: CPT | Mod: PBBFAC,,, | Performed by: INTERNAL MEDICINE

## 2017-06-12 RX ORDER — TIZANIDINE 4 MG/1
4 TABLET ORAL EVERY 6 HOURS PRN
Qty: 60 TABLET | Refills: 0 | Status: SHIPPED | OUTPATIENT
Start: 2017-06-12 | End: 2017-06-22

## 2017-06-12 RX ORDER — KETOROLAC TROMETHAMINE 30 MG/ML
30 INJECTION, SOLUTION INTRAMUSCULAR; INTRAVENOUS
Status: COMPLETED | OUTPATIENT
Start: 2017-06-12 | End: 2017-06-12

## 2017-06-12 RX ORDER — HYDROCODONE BITARTRATE AND ACETAMINOPHEN 7.5; 325 MG/1; MG/1
1 TABLET ORAL EVERY 8 HOURS PRN
Qty: 30 TABLET | Refills: 0 | Status: SHIPPED | OUTPATIENT
Start: 2017-06-12 | End: 2018-01-25

## 2017-06-12 RX ADMIN — KETOROLAC TROMETHAMINE 30 MG: 30 INJECTION, SOLUTION INTRAMUSCULAR; INTRAVENOUS at 07:06

## 2017-06-12 NOTE — PROGRESS NOTES
Subjective:       Patient ID: Mariann Huff is a 57 y.o. female.    Chief Complaint: Leg Pain (right)    HPI     57-year-old female here for evaluation of right leg pain.  She reports that her whole right leg is painful.  She has a fractured bone in her foot.  She does wear the boot at home, but cannot drive in the boot.  She reports that her leg started hurting 1-2 weeks ago.  She has a anisha horse come up and stop her in her tracks.  She has pain from her hip all the way down.  She is going to be in a boot until July 7th.  She has tried flexeril.      Review of Systems    Objective:      Physical Exam   Constitutional: She is oriented to person, place, and time. She appears well-developed and well-nourished.   HENT:   Head: Normocephalic and atraumatic.   Mouth/Throat: No oropharyngeal exudate.   Eyes: EOM are normal. Pupils are equal, round, and reactive to light. Right eye exhibits no discharge. Left eye exhibits no discharge. No scleral icterus.   Neck: Normal range of motion. Neck supple. No tracheal deviation present. No thyromegaly present.   Cardiovascular: Normal rate, regular rhythm and normal heart sounds.  Exam reveals no gallop and no friction rub.    No murmur heard.  Pulmonary/Chest: Effort normal and breath sounds normal. No respiratory distress. She has no wheezes. She has no rales. She exhibits no tenderness.   Abdominal: Soft. Bowel sounds are normal. She exhibits no distension and no mass. There is no tenderness. There is no rebound and no guarding.   Musculoskeletal: Normal range of motion. She exhibits no edema or tenderness.   Neurological: She is alert and oriented to person, place, and time.   Skin: Skin is warm and dry. No rash noted. No erythema. No pallor.   Psychiatric: She has a normal mood and affect. Her behavior is normal.   Vitals reviewed.      Assessment:       1. Right leg pain    2. Closed displaced fracture of neck of fifth metacarpal bone of right hand with routine  healing, subsequent encounter        Plan:       1/2.  Change from Flexeril to Zanaflex.  Try Vicodin 7.5/325 mg.  Ordered scooter.  Toradol 30 mg IM ×1 given.

## 2017-06-16 ENCOUNTER — OFFICE VISIT (OUTPATIENT)
Dept: ENDOCRINOLOGY | Facility: CLINIC | Age: 57
End: 2017-06-16
Payer: COMMERCIAL

## 2017-06-16 VITALS
WEIGHT: 162 LBS | HEART RATE: 64 BPM | SYSTOLIC BLOOD PRESSURE: 162 MMHG | DIASTOLIC BLOOD PRESSURE: 84 MMHG | BODY MASS INDEX: 27.66 KG/M2 | HEIGHT: 64 IN

## 2017-06-16 DIAGNOSIS — E11.42 DIABETIC PERIPHERAL NEUROPATHY ASSOCIATED WITH TYPE 2 DIABETES MELLITUS: ICD-10-CM

## 2017-06-16 DIAGNOSIS — Z79.4 TYPE 2 DIABETES MELLITUS WITH DIABETIC PERIPHERAL ANGIOPATHY WITHOUT GANGRENE, WITH LONG-TERM CURRENT USE OF INSULIN: Primary | Chronic | ICD-10-CM

## 2017-06-16 DIAGNOSIS — I25.83 CORONARY ARTERY DISEASE DUE TO LIPID RICH PLAQUE: Chronic | ICD-10-CM

## 2017-06-16 DIAGNOSIS — I10 HTN (HYPERTENSION), BENIGN: Chronic | ICD-10-CM

## 2017-06-16 DIAGNOSIS — E78.2 MIXED HYPERLIPIDEMIA: Chronic | ICD-10-CM

## 2017-06-16 DIAGNOSIS — E11.51 TYPE 2 DIABETES MELLITUS WITH DIABETIC PERIPHERAL ANGIOPATHY WITHOUT GANGRENE, WITH LONG-TERM CURRENT USE OF INSULIN: Primary | Chronic | ICD-10-CM

## 2017-06-16 DIAGNOSIS — I65.23 CAROTID STENOSIS, BILATERAL: Chronic | ICD-10-CM

## 2017-06-16 DIAGNOSIS — I25.10 CORONARY ARTERY DISEASE DUE TO LIPID RICH PLAQUE: Chronic | ICD-10-CM

## 2017-06-16 PROBLEM — E11.9 TYPE 2 DIABETES MELLITUS WITH INSULIN DEFICIENCY: Status: ACTIVE | Noted: 2017-06-16

## 2017-06-16 PROCEDURE — 3046F HEMOGLOBIN A1C LEVEL >9.0%: CPT | Mod: S$GLB,,, | Performed by: NURSE PRACTITIONER

## 2017-06-16 PROCEDURE — 4010F ACE/ARB THERAPY RXD/TAKEN: CPT | Mod: S$GLB,,, | Performed by: NURSE PRACTITIONER

## 2017-06-16 PROCEDURE — 99214 OFFICE O/P EST MOD 30 MIN: CPT | Mod: S$GLB,,, | Performed by: NURSE PRACTITIONER

## 2017-06-16 PROCEDURE — 99999 PR PBB SHADOW E&M-EST. PATIENT-LVL IV: CPT | Mod: PBBFAC,,, | Performed by: NURSE PRACTITIONER

## 2017-06-16 RX ORDER — INSULIN ASPART 100 [IU]/ML
INJECTION, SOLUTION INTRAVENOUS; SUBCUTANEOUS
Qty: 20 ML | Refills: 11
Start: 2017-06-16 | End: 2017-11-27 | Stop reason: SDUPTHER
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
## 2017-07-07 ENCOUNTER — HOSPITAL ENCOUNTER (OUTPATIENT)
Dept: RADIOLOGY | Facility: HOSPITAL | Age: 57
Discharge: HOME | End: 2017-07-07
Attending: PODIATRIST
Payer: COMMERCIAL

## 2017-07-07 DIAGNOSIS — I10 HTN (HYPERTENSION), BENIGN: Chronic | ICD-10-CM

## 2017-07-07 DIAGNOSIS — S92.356A CLOSED NONDISPLACED FRACTURE OF FIFTH METATARSAL BONE, UNSPECIFIED LATERALITY, INITIAL ENCOUNTER: ICD-10-CM

## 2017-07-07 PROCEDURE — 73630 X-RAY EXAM OF FOOT: CPT | Mod: TC,PO,RT

## 2017-07-07 PROCEDURE — 73630 X-RAY EXAM OF FOOT: CPT | Mod: 26,RT,, | Performed by: RADIOLOGY

## 2017-07-07 RX ORDER — AMLODIPINE BESYLATE 10 MG/1
TABLET ORAL
Qty: 30 TABLET | Refills: 11 | Status: SHIPPED | OUTPATIENT
Start: 2017-07-07 | End: 2018-08-15 | Stop reason: SDUPTHER

## 2017-07-07 RX ORDER — ATORVASTATIN CALCIUM 40 MG/1
TABLET, FILM COATED ORAL
Qty: 30 TABLET | Refills: 11 | Status: ON HOLD | OUTPATIENT
Start: 2017-07-07 | End: 2019-07-02 | Stop reason: HOSPADM

## 2017-07-12 ENCOUNTER — TELEPHONE (OUTPATIENT)
Dept: INTERNAL MEDICINE | Facility: CLINIC | Age: 57
End: 2017-07-12

## 2017-07-12 NOTE — TELEPHONE ENCOUNTER
----- Message from Siri Shrestha sent at 7/12/2017 11:19 AM CDT -----  Contact: self/[phone]  Patient called in regards needing an appointment with dr logan on Friday 07/14/17 (informed patient that the earliest that I found was for 07/20/17, but patient stated that she is having pain on her legs and need to see her pcp doctor). Please call and advise.         Thank you!!!

## 2017-07-12 NOTE — TELEPHONE ENCOUNTER
Patient informed Dr. Haney is out of clinic this week.  Patient is scheduled to be seen by Dr. Verma on Friday.

## 2017-07-14 ENCOUNTER — OFFICE VISIT (OUTPATIENT)
Dept: INTERNAL MEDICINE | Facility: CLINIC | Age: 57
End: 2017-07-14
Payer: COMMERCIAL

## 2017-07-14 ENCOUNTER — OFFICE VISIT (OUTPATIENT)
Dept: PODIATRY | Facility: CLINIC | Age: 57
End: 2017-07-14
Payer: COMMERCIAL

## 2017-07-14 ENCOUNTER — TELEPHONE (OUTPATIENT)
Dept: PAIN MEDICINE | Facility: CLINIC | Age: 57
End: 2017-07-14

## 2017-07-14 VITALS
BODY MASS INDEX: 27.25 KG/M2 | SYSTOLIC BLOOD PRESSURE: 140 MMHG | HEART RATE: 84 BPM | TEMPERATURE: 98 F | HEIGHT: 64 IN | DIASTOLIC BLOOD PRESSURE: 80 MMHG | WEIGHT: 159.63 LBS

## 2017-07-14 VITALS
BODY MASS INDEX: 27.14 KG/M2 | HEART RATE: 74 BPM | DIASTOLIC BLOOD PRESSURE: 78 MMHG | SYSTOLIC BLOOD PRESSURE: 165 MMHG | HEIGHT: 64 IN | WEIGHT: 159 LBS

## 2017-07-14 DIAGNOSIS — S92.354D CLOSED NONDISPLACED FRACTURE OF FIFTH METATARSAL BONE OF RIGHT FOOT WITH ROUTINE HEALING: Primary | ICD-10-CM

## 2017-07-14 DIAGNOSIS — M54.16 LUMBAR RADICULOPATHY, ACUTE: Primary | ICD-10-CM

## 2017-07-14 DIAGNOSIS — M54.5 ACUTE RIGHT-SIDED LOW BACK PAIN, WITH SCIATICA PRESENCE UNSPECIFIED: ICD-10-CM

## 2017-07-14 PROCEDURE — 99999 PR PBB SHADOW E&M-EST. PATIENT-LVL III: CPT | Mod: PBBFAC,,, | Performed by: PODIATRIST

## 2017-07-14 PROCEDURE — 28470 CLTX METATARSAL FX WO MNP EA: CPT | Mod: 58,RT,S$GLB, | Performed by: PODIATRIST

## 2017-07-14 PROCEDURE — 99024 POSTOP FOLLOW-UP VISIT: CPT | Mod: S$GLB,,, | Performed by: PODIATRIST

## 2017-07-14 PROCEDURE — 99214 OFFICE O/P EST MOD 30 MIN: CPT | Mod: S$GLB,,, | Performed by: INTERNAL MEDICINE

## 2017-07-14 PROCEDURE — 99999 PR PBB SHADOW E&M-EST. PATIENT-LVL III: CPT | Mod: PBBFAC,,, | Performed by: INTERNAL MEDICINE

## 2017-07-14 NOTE — PATIENT INSTRUCTIONS
Toe and Metatarsal Fractures (Broken Toes)        The structure of the foot is complex, consisting of bones, muscles, tendons and other soft tissues. Of the 28 bones in the foot, 19 are toe bones (phalanges) and metatarsal bones (the long bones in the midfoot). Fractures of the toe and metatarsal bones are common and require evaluation by a specialist. A foot and ankle surgeon should be seen for proper diagnosis and treatment, even if initial treatment has been received in an emergency room.    What Is a Fracture?  A fracture is a break in the bone. Fractures can be divided into two categories: traumatic fractures and stress fractures.     Traumatic fractures (also called acute fractures) are caused by a direct blow or impact, such as seriously stubbing your toe. Traumatic fractures can be displaced or nondisplaced. If the fracture is displaced, the bone is broken in such a way that it has changed in position (malpositioned).    Signs and symptoms of a traumatic fracture include:    You may hear a sound at the time of the break.  Pinpoint pain (pain at the place of impact) at the time the fracture occurs and perhaps for a few hours later, but often the pain goes away after several hours.  Crooked or abnormal appearance of the toe.  Bruising and swelling the next day.     It is not true that if you can walk on it, its not broken. Evaluation by a foot and ankle surgeon is always recommended.    Stress fractures are tiny hairline breaks usually caused by repetitive stress. Stress fractures often afflict athletes who, for example, too rapidly increase their running mileage. They can also be caused by an abnormal foot structure, deformities or osteoporosis. Improper footwear may also lead to stress fractures. Stress fractures should not be ignored. They require proper medical attention to heal correctly.    Symptoms of stress fractures include:    Pain with or after normal activity  Pain that goes away when resting  and then returns when standing or during activity  Pinpoint pain (pain at the site of the fracture) when touched  Swelling but no bruising     Consequences of Improper Treatment  Some people say that the doctor cant do anything for a broken bone in the foot. This is usually not true. In fact, if a fractured toe or metatarsal bone is not treated correctly, serious complications may develop. For example:    A deformity in the bony architecture, which may limit the ability to move the foot or cause difficulty in fitting shoes.  Arthritis, which may be caused by a fracture in a joint (the juncture where two bones meet), or may be a result of angular deformities that develop when a displaced fracture is severe or has not been properly corrected.  Chronic pain and deformity.  Nonunion, or failure to heal, can lead to subsequent surgery or chronic pain.     Treatment of Toe Fractures  Fractures of the toe bones are almost always traumatic fractures. Treatment for traumatic fractures depends on the break itself and may include these options:    Rest. Sometimes rest is all that is needed to treat a traumatic fracture of the toe.  Splinting. The toe may be fitted with a splint to keep it in a fixed position.  Rigid or stiff-soled shoe. Wearing a stiff-soled shoe protects the toe and helps keep it properly positioned. Use of a postoperative shoe or bootwalker is also helpful.  David taping the fractured toe to another toe is sometimes appropriate, but in other cases, it may be harmful.  Surgery. If the break is badly displaced or if the joint is affected, surgery may be necessary. Surgery often involves the use of fixation devices, such as pins.     Treatment of Metatarsal Fractures  Breaks in the metatarsal bones may be either stress or traumatic fractures. Certain kinds of fractures of the metatarsal bones present unique challenges.    For example, sometimes a fracture of the first metatarsal bone (behind the big toe) can  lead to arthritis. Since the big toe is used so frequently and bears more weight than other toes, arthritis in that area can make it painful to walk, bend or even stand.    Another type of break, called a Mckeon fracture, occurs at the base of the fifth metatarsal bone (behind the little toe). It is often misdiagnosed as an ankle sprain, and misdiagnosis can have serious consequences since sprains and fractures require different treatments. Your foot and ankle surgeon is an expert in correctly identifying these conditions as well as other problems of the foot.    Treatment of metatarsal fractures depends on the type and extent of the fracture and may include:    Rest. Sometimes rest is the only treatment needed to promote healing of a stress or traumatic fracture of a metatarsal bone.  Avoid the offending activity. Because stress fractures result from repetitive stress, it is important to avoid the activity that led to the fracture. Crutches or a wheelchair are sometimes required to offload weight from the foot to give it time to heal.  Immobilization, casting or rigid shoe. A stiff-soled shoe or other form of immobilization may be used to protect the fractured bone while it is healing. Use of a postoperative shoe or bootwalker is also helpful.  Surgery. Some traumatic fractures of the metatarsal bones require surgery, especially if the break is badly displaced.  Follow-up care. Your foot and ankle surgeon will provide instructions for care following surgical or nonsurgical treatment. Physical therapy, exercises and rehabilitation may be included in a schedule for return to normal activities.          Bone Healing  How Does a Bone Heal?    All broken bones go through the same healing process. This is true whether a bone has been cut as part of a surgical procedure or fractured through an injury.     The bone healing process has three overlapping stages: inflammation, bone production and bone  remodeling.        Inflammation starts immediately after the bone is fractured and lasts for several days. When the bone is fractured, there is bleeding into the area, leading to inflammation and clotting of blood at the fracture site. This provides the initial structural stability and framework for producing new bone.        Bone production begins when the clotted blood formed by inflammation is replaced with fibrous tissue and cartilage (known as soft callus). As healing progresses, the soft callus is replaced with hard bone (known as hard callus), which is visible on x-rays several weeks after the fracture.        Bone remodeling, the final phase of bone healing, goes on for several months. In remodeling, bone continues to form and becomes compact, returning to its original shape. In addition, blood circulation in the area improves. Once adequate bone healing has occurred, weightbearing (such as standing or walking) encourages bone remodeling.?  How Long Does Bone Healing Take?   Bone generally takes 6 to 12 weeks to heal to a significant degree. In general, children's bones heal faster than those of adults. The foot and ankle surgeon will determine when the patient is ready to bear weight on the area. This will depend on the location and severity of the fracture, the type of surgical procedure performed and other considerations.    What Helps Promote Bone Healing?  If a bone will be cut during a planned surgical procedure, some steps can be taken pre- and postoperatively to help optimize healing. The surgeon may offer advice on diet and nutritional supplements that are essential to bone growth. Smoking cessation and adequate control of blood sugar levels in people living with diabetes are important. Smoking and high glucose levels interfere with bone healing.     For all patients with fractured bones, immobilization is a critical part of treatment because any movement of bone fragments slows down the initial  healing process. Depending on the type of fracture or surgical procedure, the surgeon may use some form of fixation (such as screws, plates or wires) on the fractured bone and/or a cast to keep the bone from moving. During the immobilization period, weightbearing is restricted as instructed by the surgeon.     Once the bone is adequately healed, physical therapy often plays a key role in rehabilitation. An exercise program designed for the patient can help in regaining strength and balance and can assist in returning to normal activities.    What Can Hinder Bone Healing?   A wide variety of factors can slow down the healing process. These include:    Movement of the bone fragments; weightbearing too soon  Smoking, which constricts the blood vessels and decreases circulation  Medical conditions, such as diabetes, hormone-related problems or vascular disease  Some medications, such as corticosteroids and other immunosuppressants  Fractures that are severe, complicated or become infected  Advanced age  Poor nutrition or impaired metabolism  Low levels of calcium and vitamin D     How Can Slow Healing Be Treated?   If the bone is not healing as well as expected or fails to heal, the foot and ankle surgeon can choose from a variety of treatment options to enhance bone growth, such as continued immobilization for a longer period, bone stimulation or surgery with bone grafting or use of bone growth proteins      Understanding Fifth Metatarsal Fracture    A fifth metatarsal fracture is a type of broken bone in your foot. You have 5 metatarsals. They are the middle bones in your feet, between your toes and your anklebones (tarsals). The fifth metatarsal connects your smallest toe to your ankle. These bones help with arch support and balance.     How to say it  met-[id number]-TAHR-sal   What causes a fifth metatarsal fracture?  A direct blow to the bone is often the cause of a fracture of the fifth metatarsal. That may happen if you  drop a heavy object on your foot or land wrong on your foot or ankle. Twisting activities can also break the bone. Pivoting while playing basketball is one example.  Repeatedly placing too much stress on the bone can also cause a fracture of the fifth metatarsal. This is called a stress fracture. People who do physical activities like dancing or running tend to be more prone to stress fractures.  Symptoms of a fifth metatarsal fracture  Sudden pain along the outside of your foot is the main symptom. A stress fracture may develop more slowly. You may feel chronic pain for a period of time. Your foot may also swell up and bruise. You may have trouble walking.  Treatment for a fifth metatarsal fracture  Treatment for this type of fracture depends on where the bone is broken and how severe the breakage is. Healing can take up to several months. Treatment may include:  · Cold therapy. Putting ice on the area may reduce swelling and pain, especially in the first few days after injury.  · Elevation. Propping up the foot so it is above the level of your heart may ease swelling.  · Prescription or over-the-counter pain medicines. These help reduce pain and swelling.  · Immobilization. Devices such as a splint, cast, or walking boot can protect the bone and ease pain. They can help keep the bone in place so it heals properly. You may need to avoid putting any weight on the broken bone for a period of time. Severe fractures usually need a longer limit on weight-bearing activities.  · Stretching and strengthening exercises. Certain exercises can help you regain flexibility and strength in your foot.  · Surgery. You usually will not need surgery. But you may need it if the bone is broken into 2 or more pieces and is not aligned (displaced), doesnt heal properly, or takes a long time to heal.  Possible complications of a fifth metatarsal fracture  · The bone doesnt heal correctly  · Acute compartment syndrome. This is when  pressure builds up in the muscles of the foot and affects blood flow.     When to call your healthcare provider  Call your healthcare provider right away if you have any of these:  · Fever of 100.4°F (38°C) or higher, or as directed  · Symptoms that dont get better, or get worse  · Numbness or coldness in your foot  · Toe nails that turn blue or grey in color  · New symptoms   Date Last Reviewed: 3/10/2016  © 2867-3120 [employer]. [address] [place] [address]. All rights reserved. This information is not intended as a substitute for professional medical care. Always follow your healthcare professional's instructions.
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
## 2017-07-14 NOTE — TELEPHONE ENCOUNTER
Staff contacted and spoke to patient regarding the message sent by her provider.     She was offered an appointmetn with

## 2017-07-14 NOTE — TELEPHONE ENCOUNTER
----- Message from Cynthia CONDE Ibrahima sent at 7/14/2017  7:54 AM CDT -----  Contact: [phone] [facility]  Dr Verma entered a referral for patient to be seen by pain clinic.  Patient has been seen in the past by Dr Smith.  I scheduled a tentative appointment for patient on 7/21 with SANTOSH Kincaid.  Patient states she lives in [place] and does not drive in the city.  Patient only goes into the city with her  if he is off work which has not worked well in the past when scheduling for the pain clinic.  Patient is wanting to be transferred to the pain clinic in [place].  Please call patient to discuss this issue.

## 2017-08-15 RX ORDER — ZOLPIDEM TARTRATE 5 MG/1
5 TABLET ORAL NIGHTLY PRN
Qty: 30 TABLET | Refills: 2 | Status: SHIPPED | OUTPATIENT
Start: 2017-08-15 | End: 2018-02-04 | Stop reason: SDUPTHER

## 2017-08-28 ENCOUNTER — OFFICE VISIT (OUTPATIENT)
Dept: INTERNAL MEDICINE | Facility: CLINIC | Age: 57
End: 2017-08-28
Payer: MEDICARE

## 2017-08-28 VITALS
WEIGHT: 160.69 LBS | HEIGHT: 64 IN | HEART RATE: 91 BPM | BODY MASS INDEX: 27.43 KG/M2 | TEMPERATURE: 98 F | DIASTOLIC BLOOD PRESSURE: 72 MMHG | SYSTOLIC BLOOD PRESSURE: 163 MMHG

## 2017-08-28 DIAGNOSIS — J20.8 ACUTE BACTERIAL BRONCHITIS: Primary | ICD-10-CM

## 2017-08-28 DIAGNOSIS — B96.89 ACUTE BACTERIAL BRONCHITIS: Primary | ICD-10-CM

## 2017-08-28 PROCEDURE — 99214 OFFICE O/P EST MOD 30 MIN: CPT | Mod: S$GLB,,, | Performed by: INTERNAL MEDICINE

## 2017-08-28 PROCEDURE — 99999 PR PBB SHADOW E&M-EST. PATIENT-LVL III: CPT | Mod: PBBFAC,,, | Performed by: INTERNAL MEDICINE

## 2017-08-28 PROCEDURE — 3078F DIAST BP <80 MM HG: CPT | Mod: S$GLB,,, | Performed by: INTERNAL MEDICINE

## 2017-08-28 PROCEDURE — 99213 OFFICE O/P EST LOW 20 MIN: CPT | Mod: PBBFAC,PO | Performed by: INTERNAL MEDICINE

## 2017-08-28 PROCEDURE — 3008F BODY MASS INDEX DOCD: CPT | Mod: S$GLB,,, | Performed by: INTERNAL MEDICINE

## 2017-08-28 PROCEDURE — 3077F SYST BP >= 140 MM HG: CPT | Mod: S$GLB,,, | Performed by: INTERNAL MEDICINE

## 2017-08-28 RX ORDER — AZITHROMYCIN 250 MG/1
TABLET, FILM COATED ORAL
Qty: 6 TABLET | Refills: 0 | Status: SHIPPED | OUTPATIENT
Start: 2017-08-28 | End: 2017-09-19 | Stop reason: ALTCHOICE

## 2017-08-28 RX ORDER — HYDROCODONE BITARTRATE AND HOMATROPINE METHYLBROMIDE ORAL SOLUTION 5; 1.5 MG/5ML; MG/5ML
5 LIQUID ORAL NIGHTLY PRN
Qty: 180 ML | Refills: 0 | Status: SHIPPED | OUTPATIENT
Start: 2017-08-28 | End: 2017-09-07

## 2017-08-28 RX ORDER — ALBUTEROL SULFATE 90 UG/1
1-2 AEROSOL, METERED RESPIRATORY (INHALATION) EVERY 6 HOURS PRN
Qty: 1 EACH | Refills: 0 | Status: SHIPPED | OUTPATIENT
Start: 2017-08-28 | End: 2018-11-30

## 2017-08-28 RX ORDER — BENZONATATE 200 MG/1
200 CAPSULE ORAL 3 TIMES DAILY PRN
Qty: 30 CAPSULE | Refills: 2 | Status: SHIPPED | OUTPATIENT
Start: 2017-08-28 | End: 2017-09-04

## 2017-08-28 NOTE — PROGRESS NOTES
Subjective:       Patient ID: Mariann Huff is a 57 y.o. female.    Chief Complaint: Cough (x 3 weeks) and Sore Throat    HPI       Review of Systems    Objective:      Physical Exam   Constitutional: She is oriented to person, place, and time. She appears well-developed and well-nourished.   HENT:   Head: Normocephalic and atraumatic.   Mouth/Throat: No oropharyngeal exudate.   Eyes: EOM are normal. Pupils are equal, round, and reactive to light. Right eye exhibits no discharge. Left eye exhibits no discharge. No scleral icterus.   Neck: Normal range of motion. Neck supple. No tracheal deviation present. No thyromegaly present.   Cardiovascular: Normal rate, regular rhythm and normal heart sounds.  Exam reveals no gallop and no friction rub.    No murmur heard.  Pulmonary/Chest: Effort normal and breath sounds normal. No respiratory distress. She has no wheezes. She has no rales. She exhibits no tenderness.   Abdominal: Soft. Bowel sounds are normal. She exhibits no distension and no mass. There is no tenderness. There is no rebound and no guarding.   Musculoskeletal: Normal range of motion. She exhibits no edema or tenderness.   Neurological: She is alert and oriented to person, place, and time.   Skin: Skin is warm and dry. No rash noted. No erythema. No pallor.   Psychiatric: She has a normal mood and affect. Her behavior is normal.   Vitals reviewed.      Assessment:       1. Acute bacterial bronchitis        Plan:       1.  Zpack prescribed.  Tessalon perrles and hycodan cough syrup for night prescribed as well.

## 2017-08-30 ENCOUNTER — DOCUMENTATION ONLY (OUTPATIENT)
Dept: REHABILITATION | Facility: HOSPITAL | Age: 57
End: 2017-08-30

## 2017-08-30 DIAGNOSIS — M54.2 NECK PAIN ON RIGHT SIDE: ICD-10-CM

## 2017-08-30 DIAGNOSIS — G89.29 CHRONIC BILATERAL LOW BACK PAIN WITH SCIATICA, SCIATICA LATERALITY UNSPECIFIED: ICD-10-CM

## 2017-08-30 DIAGNOSIS — M25.60 DECREASED RANGE OF MOTION: ICD-10-CM

## 2017-08-30 DIAGNOSIS — M54.40 CHRONIC BILATERAL LOW BACK PAIN WITH SCIATICA, SCIATICA LATERALITY UNSPECIFIED: ICD-10-CM

## 2017-08-30 DIAGNOSIS — R53.1 DECREASED STRENGTH: ICD-10-CM

## 2017-08-30 DIAGNOSIS — R26.89 DECREASED FUNCTIONAL MOBILITY: ICD-10-CM

## 2017-08-30 NOTE — PROGRESS NOTES
Pt was evaluated on 10/14/16 and was seen 6 times for PT. Pt has not attended PT since 11/7/16. Pt was given HEP. Current status is unknown. Pt to be d/c'd at this time.

## 2017-09-15 DIAGNOSIS — I10 HTN (HYPERTENSION), BENIGN: Chronic | ICD-10-CM

## 2017-09-15 RX ORDER — AMLODIPINE BESYLATE 10 MG/1
TABLET ORAL
Qty: 30 TABLET | Refills: 11 | Status: SHIPPED | OUTPATIENT
Start: 2017-09-15 | End: 2017-09-19 | Stop reason: SDUPTHER

## 2017-09-19 ENCOUNTER — OFFICE VISIT (OUTPATIENT)
Dept: INTERNAL MEDICINE | Facility: CLINIC | Age: 57
End: 2017-09-19
Payer: MEDICARE

## 2017-09-19 ENCOUNTER — LAB VISIT (OUTPATIENT)
Dept: LAB | Facility: HOSPITAL | Age: 57
End: 2017-09-19
Attending: INTERNAL MEDICINE
Payer: MEDICARE

## 2017-09-19 VITALS
TEMPERATURE: 98 F | WEIGHT: 160.25 LBS | HEART RATE: 85 BPM | BODY MASS INDEX: 27.36 KG/M2 | SYSTOLIC BLOOD PRESSURE: 173 MMHG | DIASTOLIC BLOOD PRESSURE: 90 MMHG | HEIGHT: 64 IN

## 2017-09-19 DIAGNOSIS — I15.2 HYPERTENSION ASSOCIATED WITH DIABETES: Chronic | ICD-10-CM

## 2017-09-19 DIAGNOSIS — R16.0 HEPATOMEGALY: ICD-10-CM

## 2017-09-19 DIAGNOSIS — K21.9 GASTROESOPHAGEAL REFLUX DISEASE, ESOPHAGITIS PRESENCE NOT SPECIFIED: ICD-10-CM

## 2017-09-19 DIAGNOSIS — Q63.9 RENAL STRUCTURAL ABNORMALITY: ICD-10-CM

## 2017-09-19 DIAGNOSIS — R16.0 HEPATOMEGALY: Primary | ICD-10-CM

## 2017-09-19 DIAGNOSIS — E11.59 HYPERTENSION ASSOCIATED WITH DIABETES: Chronic | ICD-10-CM

## 2017-09-19 LAB
ALBUMIN SERPL BCP-MCNC: 3.5 G/DL
ALP SERPL-CCNC: 114 U/L
ALT SERPL W/O P-5'-P-CCNC: 15 U/L
ANION GAP SERPL CALC-SCNC: 10 MMOL/L
AST SERPL-CCNC: 15 U/L
BILIRUB SERPL-MCNC: 0.8 MG/DL
BUN SERPL-MCNC: 8 MG/DL
CALCIUM SERPL-MCNC: 9.9 MG/DL
CHLORIDE SERPL-SCNC: 107 MMOL/L
CO2 SERPL-SCNC: 29 MMOL/L
CREAT SERPL-MCNC: 0.8 MG/DL
EST. GFR  (AFRICAN AMERICAN): >60 ML/MIN/1.73 M^2
EST. GFR  (NON AFRICAN AMERICAN): >60 ML/MIN/1.73 M^2
GLUCOSE SERPL-MCNC: 172 MG/DL
POTASSIUM SERPL-SCNC: 3.9 MMOL/L
PROT SERPL-MCNC: 7.6 G/DL
SODIUM SERPL-SCNC: 146 MMOL/L

## 2017-09-19 PROCEDURE — 36415 COLL VENOUS BLD VENIPUNCTURE: CPT | Mod: PO

## 2017-09-19 PROCEDURE — 3008F BODY MASS INDEX DOCD: CPT | Mod: S$GLB,,, | Performed by: INTERNAL MEDICINE

## 2017-09-19 PROCEDURE — 80053 COMPREHEN METABOLIC PANEL: CPT

## 2017-09-19 PROCEDURE — 3046F HEMOGLOBIN A1C LEVEL >9.0%: CPT | Mod: S$GLB,,, | Performed by: INTERNAL MEDICINE

## 2017-09-19 PROCEDURE — 99999 PR PBB SHADOW E&M-EST. PATIENT-LVL III: CPT | Mod: PBBFAC,,, | Performed by: INTERNAL MEDICINE

## 2017-09-19 PROCEDURE — 3077F SYST BP >= 140 MM HG: CPT | Mod: S$GLB,,, | Performed by: INTERNAL MEDICINE

## 2017-09-19 PROCEDURE — 4010F ACE/ARB THERAPY RXD/TAKEN: CPT | Mod: S$GLB,,, | Performed by: INTERNAL MEDICINE

## 2017-09-19 PROCEDURE — 3080F DIAST BP >= 90 MM HG: CPT | Mod: S$GLB,,, | Performed by: INTERNAL MEDICINE

## 2017-09-19 PROCEDURE — 99214 OFFICE O/P EST MOD 30 MIN: CPT | Mod: S$GLB,,, | Performed by: INTERNAL MEDICINE

## 2017-09-19 RX ORDER — ESOMEPRAZOLE MAGNESIUM 40 MG/1
40 CAPSULE, DELAYED RELEASE ORAL
Qty: 90 CAPSULE | Refills: 3 | Status: SHIPPED | OUTPATIENT
Start: 2017-09-19 | End: 2018-12-06 | Stop reason: SDUPTHER

## 2017-09-19 NOTE — PROGRESS NOTES
Subjective:       Patient ID: Mariann Huff is a 57 y.o. female.    Chief Complaint: Flank Pain and Heartburn    HPI     57-year-old female here for evaluation of right knee pain as well as heartburn.  She had an MRI done for her back with the chiropractor.  She was noted to have hepatomegaly as well as multiple lesions in her right kidney that were likely cysts.    She has bad heartburn.  This is very uncomfortable. She has been having this for the last two weeks.  She had been sick recently with a virus.  She has taken nexium which worked well, but she ran out and her co-pay was high.  OTC medication is not working.  She has tried alkaseltzer chewable things for heart burn.    HTN -  Patient's co morbidities include: DM. Patient is currently on norvasc 10 mg, losartan 100mg. She does not check her BP at home. Side effects of medications note: none. Denies headaches, blurred vision, chest pain, shortness of breath, nausea.    Review of Systems    Objective:      Physical Exam   Constitutional: She is oriented to person, place, and time. She appears well-developed and well-nourished.   HENT:   Head: Normocephalic and atraumatic.   Mouth/Throat: No oropharyngeal exudate.   Eyes: EOM are normal. Pupils are equal, round, and reactive to light. Right eye exhibits no discharge. Left eye exhibits no discharge. No scleral icterus.   Neck: Normal range of motion. Neck supple. No tracheal deviation present. No thyromegaly present.   Cardiovascular: Normal rate, regular rhythm and normal heart sounds.  Exam reveals no gallop and no friction rub.    No murmur heard.  Pulmonary/Chest: Effort normal and breath sounds normal. No respiratory distress. She has no wheezes. She has no rales. She exhibits no tenderness.   Abdominal: Soft. Bowel sounds are normal. She exhibits no distension and no mass. There is no tenderness. There is no rebound and no guarding.   Musculoskeletal: Normal range of motion. She exhibits no edema or  tenderness.   Neurological: She is alert and oriented to person, place, and time.   Skin: Skin is warm and dry. No rash noted. No erythema. No pallor.   Psychiatric: She has a normal mood and affect. Her behavior is normal.   Vitals reviewed.      Assessment:       1. Hepatomegaly    2. Renal structural abnormality    3. Gastroesophageal reflux disease, esophagitis presence not specified    4. Hypertension associated with diabetes        Plan:       1.  Check ultrasound liver limited.  2.  Check Reji.  Grady ultrasound.  3.  Restart Nexium 40 mrem daily.  Patient had recent normal scope with a Schatzki ring.  4.  Advised patient check blood pressures at home and let me know what they're running.  Patient missed medication both yesterday and today.  On Norvasc 10 mg, losartan 100 mg.

## 2017-09-20 ENCOUNTER — HOSPITAL ENCOUNTER (OUTPATIENT)
Dept: RADIOLOGY | Facility: HOSPITAL | Age: 57
Discharge: HOME | End: 2017-09-20
Attending: INTERNAL MEDICINE
Payer: MEDICARE

## 2017-09-20 ENCOUNTER — TELEPHONE (OUTPATIENT)
Dept: INTERNAL MEDICINE | Facility: CLINIC | Age: 57
End: 2017-09-20

## 2017-09-20 DIAGNOSIS — Q61.02 MULTIPLE RENAL CYSTS: Primary | ICD-10-CM

## 2017-09-20 DIAGNOSIS — Q63.9 RENAL STRUCTURAL ABNORMALITY: ICD-10-CM

## 2017-09-20 DIAGNOSIS — Z12.31 OTHER SCREENING MAMMOGRAM: ICD-10-CM

## 2017-09-20 PROCEDURE — 77067 SCR MAMMO BI INCL CAD: CPT | Mod: 26,,, | Performed by: RADIOLOGY

## 2017-09-20 PROCEDURE — 77067 SCR MAMMO BI INCL CAD: CPT | Mod: TC

## 2017-09-20 PROCEDURE — 76770 US EXAM ABDO BACK WALL COMP: CPT | Mod: 26,,, | Performed by: RADIOLOGY

## 2017-09-20 PROCEDURE — 76770 US EXAM ABDO BACK WALL COMP: CPT | Mod: TC

## 2017-09-20 PROCEDURE — 77063 BREAST TOMOSYNTHESIS BI: CPT | Mod: 26,,, | Performed by: RADIOLOGY

## 2017-09-20 NOTE — TELEPHONE ENCOUNTER
You have chronic kidney disease, which we already knew.  The ultrasound does confirm cyst.  You have some kidney stones.  The cysts noted on the MRI you had are mostly nothing to worry about, because these are simple cyst.  However, there are some minimally complicated cyst, which could be more serious.  I'm going to refer you to nephrology.

## 2017-09-22 DIAGNOSIS — E78.5 HYPERLIPIDEMIA: Chronic | ICD-10-CM

## 2017-09-22 RX ORDER — ATORVASTATIN CALCIUM 40 MG/1
TABLET, FILM COATED ORAL
Qty: 30 TABLET | Refills: 11 | Status: SHIPPED | OUTPATIENT
Start: 2017-09-22 | End: 2017-11-01 | Stop reason: SDUPTHER

## 2017-09-27 ENCOUNTER — DOCUMENTATION ONLY (OUTPATIENT)
Dept: TRANSPLANT | Facility: CLINIC | Age: 57
End: 2017-09-27

## 2017-09-27 ENCOUNTER — HOSPITAL ENCOUNTER (OUTPATIENT)
Dept: RADIOLOGY | Facility: HOSPITAL | Age: 57
Discharge: HOME | End: 2017-09-27
Attending: INTERNAL MEDICINE
Payer: MEDICARE

## 2017-09-27 ENCOUNTER — TELEPHONE (OUTPATIENT)
Dept: INTERNAL MEDICINE | Facility: CLINIC | Age: 57
End: 2017-09-27

## 2017-09-27 DIAGNOSIS — R16.0 HEPATOMEGALY: ICD-10-CM

## 2017-09-27 DIAGNOSIS — K76.0 FATTY LIVER: Primary | ICD-10-CM

## 2017-09-27 DIAGNOSIS — R16.0 HEPATOMEGALIA: ICD-10-CM

## 2017-09-27 PROCEDURE — 76705 ECHO EXAM OF ABDOMEN: CPT | Mod: TC

## 2017-09-27 PROCEDURE — 76705 ECHO EXAM OF ABDOMEN: CPT | Mod: 26,,, | Performed by: RADIOLOGY

## 2017-09-27 NOTE — NURSING
Pt records reviewed.   Pt will be referred to Hepatology.  Fatty liver  Initial referral received  from the [facility]que.   Referring Provider/diagnosis  Jasbir Haney MD  Referral letter sent to provider and patient.

## 2017-09-27 NOTE — LETTER
September 27, 2017    Mariann ABEL [address]  [place] [address]      Dear Mariann Huff:    Your doctor has referred you to the [facility]. You will be contacted by our office and an initial appointment will then be scheduled for you.    We look forward to seeing you soon. If you have any further questions, please contact us at [phone].       Sincerely,        [facility]   [address] [place] [id number]  [phone]

## 2017-09-27 NOTE — TELEPHONE ENCOUNTER
Spoke with patient. Results reviewed per Dr. Haney's notes. Verbalized understanding.  Referral sent to coordinator for scheduling.

## 2017-09-27 NOTE — LETTER
September 27, 2017    Jasbir Haney MD  [address] [facility] [address]      Dear Dr. Haney    Patient: Mariann Huff   MR Number: [id number]   YOB: 1960     Thank you for the referral of Mariann Huff to the [facility]. An initial appointment will be scheduled for your patient with one of our Hepatologists.      Thank you again for your trust in our program.  If there is anything we can do for you or your staff, please feel free to contact us.        Sincerely,        [facility]  [address] [place] [id number]  [phone]

## 2017-09-27 NOTE — TELEPHONE ENCOUNTER
Your liver is enlarged.  Looks like you have a fatty infiltration of your liver.  Weight loss will help with this.  I'm going to refer you to hepatology to ensure that there is no cirrhosis occurring.

## 2017-09-28 RX ORDER — NYSTATIN 100000 U/G
CREAM TOPICAL 3 TIMES DAILY
Qty: 30 G | Refills: 3 | Status: SHIPPED | OUTPATIENT
Start: 2017-09-28 | End: 2018-01-25

## 2017-10-02 NOTE — PROGRESS NOTES
"CC: Management of Type 2 Diabetes     HPI: Mrs. Mariann Huff was diagnosed with Type 2 in 2002. Was asymptomatic at the time of diagnosis. Diagnosed based on bloodwork. Started on orals, but then started insulin in 2002. She did not tolerate metformin (GI complaints). No DM hospitalizations. Currently on MDI.  C-peptide 1.36, was 0.7 at one time, but last 2 readings WNL.    Since her last visit she reports she was diagnosed with fatty liver. Has a hepatology appointment today.      Denies missing doses of insulin.     No logs to review.     States she is monitoring BG 1x/day (fasting only). States readings are 80s-120s.     Appetite good. Admits to drinking 2 regular soft drinks daily (12 oz coke) and snacking between meals.     Taking Toujeo in split bid dosing. No hypoglycemia.     CURRENT DIABETIC MEDS:Toujeo 42 units bid,  Humalog 30-30-30 units AC plus correction scale 25 ISF with goal of 150, Invokana 300 mg PO daily    Last Eye Exam: 2016 (history of bilateral cataract surgery) and reports she will make appointment once she has been evaluated by Hepatology   Last Podiatry Exam: July 2017    REVIEW OF SYSTEMS  General: no fatigue or weakness.   Eyes: continues with left eye watering and blurry vision at times.   Cardiac: no palpitations or chest pain.   Respiratory: no dyspnea or cough.  GI: good appetite; no c/o nausea.   Skin: no rashes, itching, or insulin injection site reactions; (+) rotation of insulin injection sites.    Neuro: no numbness or tingling; no dizziness  End: no polyuria; no polydipsia or polyphagia.   GYN: no c/o vaginal itching or discharge; no yeast infections.    MS: reports chronic (+) back and leg pain.     Vital Signs  BP (!) 148/80   Pulse 76   Ht 5' 4" (1.626 m)   Wt 71.2 kg (157 lb)   BMI 26.95 kg/m²     Hemoglobin A1C   Date Value Ref Range Status   06/12/2017 9.9 (H) 4.0 - 5.6 % Final     Comment:     According to ADA guidelines, hemoglobin A1c <7.0% represents  optimal " control in non-pregnant diabetic patients. Different  metrics may apply to specific patient populations.   Standards of Medical Care in Diabetes-2016.  For the purpose of screening for the presence of diabetes:  <5.7%     Consistent with the absence of diabetes  5.7-6.4%  Consistent with increasing risk for diabetes   (prediabetes)  >or=6.5%  Consistent with diabetes  Currently, no consensus exists for use of hemoglobin A1c  for diagnosis of diabetes for children.  This Hemoglobin A1c assay has significant interference with fetal   hemoglobin   (HbF). The results are invalid for patients with abnormal amounts of   HbF,   including those with known Hereditary Persistence   of Fetal Hemoglobin. Heterozygous hemoglobin variants (HbAS, HbAC,   HbAD, HbAE, HbA2) do not significantly interfere with this assay;   however, presence of multiple variants in a sample may impact the %   interference.     02/27/2017 10.3 (H) 4.5 - 6.2 % Final     Comment:     According to ADA guidelines, hemoglobin A1C <7.0% represents  optimal control in non-pregnant diabetic patients.  Different  metrics may apply to specific populations.   Standards of Medical Care in Diabetes - 2016.  For the purpose of screening for the presence of diabetes:  <5.7%     Consistent with the absence of diabetes  5.7-6.4%  Consistent with increasing risk for diabetes   (prediabetes)  >or=6.5%  Consistent with diabetes  Currently no consensus exists for use of hemoglobin A1C  for diagnosis of diabetes for children.     11/18/2016 10.3 (H) 4.5 - 6.2 % Final     Comment:     According to ADA guidelines, hemoglobin A1C <7.0% represents  optimal control in non-pregnant diabetic patients.  Different  metrics may apply to specific populations.   Standards of Medical Care in Diabetes - 2016.  For the purpose of screening for the presence of diabetes:  <5.7%     Consistent with the absence of diabetes  5.7-6.4%  Consistent with increasing risk for diabetes    (prediabetes)  >or=6.5%  Consistent with diabetes  Currently no consensus exists for use of hemoglobin A1C  for diagnosis of diabetes for children.         Chemistry        Component Value Date/Time     (H) 09/19/2017 1047    K 3.9 09/19/2017 1047     09/19/2017 1047    CO2 29 09/19/2017 1047    BUN 8 09/19/2017 1047    CREATININE 0.8 09/19/2017 1047     (H) 09/19/2017 1047        Component Value Date/Time    CALCIUM 9.9 09/19/2017 1047    ALKPHOS 114 09/19/2017 1047    AST 15 09/19/2017 1047    ALT 15 09/19/2017 1047    BILITOT 0.8 09/19/2017 1047          Lab Results   Component Value Date    CHOL 191 02/27/2017    CHOL 184 08/09/2016    CHOL 188 01/12/2016     Lab Results   Component Value Date    HDL 40 02/27/2017    HDL 33 (L) 08/09/2016    HDL 42 01/12/2016     Lab Results   Component Value Date    LDLCALC 123.8 02/27/2017    LDLCALC 113.8 08/09/2016    LDLCALC 120.4 01/12/2016     Lab Results   Component Value Date    TRIG 136 02/27/2017    TRIG 186 (H) 08/09/2016    TRIG 128 01/12/2016     Lab Results   Component Value Date    CHOLHDL 20.9 02/27/2017    CHOLHDL 17.9 (L) 08/09/2016    CHOLHDL 22.3 01/12/2016       Lab Results   Component Value Date    TSH 1.656 02/27/2017       Lab Results   Component Value Date    MICALBCREAT 930.2 (H) 06/12/2017     Component      Latest Ref Rng 8/22/2016   C-Peptide      0.9 - 5.5 ng/mL 0.7 (L)     Component      Latest Ref Rng & Units 6/12/2017 11/18/2016   C-Peptide      0.78 - 5.19 ng/mL 1.36 1.5       PHYSICAL EXAMINATION  Constitutional: Appears well, no distress  Neck: Supple, trachea midline.   Respiratory: CTA without wheezes, even and unlabored.  Cardiovascular: RRR; bialteral carotid bruit; no murmurs.   Lymph: no edema.   Skin: warm and dry; no injection site reactions, no acanthosis nigracans observed.  Feet: see foot exam from Podiatry note 6/2017. Appropriate footwear.    Assessment/Plan  Type 2 diabetes mellitus with diabetic peripheral  angiopathy without gangrene:   Unknown DM control (fructosamine today)  A1C scheduled for 10/25/17  Continue insulin doses and PO doses as documented above.   Discussed fluid intake with Invokana to avoid dehydration.   Reduce intake of regular soft drinks (initial goal of 1 drink daily, then wean off). We have previously discussed cessation of regular soft drinks, but she has not done this yet.   Monitor readings at least 3x/day and bring logs to next visit.     Diabetic peripheral neuropathy associated with type 2 diabetes mellitus  Improve BG readings.    Coronary artery disease due to lipid rich plaque  Managed by Cardiology and followed every 6 months  Discussed improving BG control  Discussed BP goal of <140/90. Readings have improved    HTN (hypertension), benign  Continue Amlodipine, Losartan, Metoprolol  Managed by Cardiology    Hyperlipidemia  Continue Atorvastatin, Lofibra, and Lovaza  Managed by Cardiology.     Bilateral Carotid Stenosis  Continue to improve BG control. Had US in August 2016 and is followed by Cardiology for this.     Fatty Liver  Appointment today with hepatology  Improve BG, keep lipids controlled   On statin    FOLLOW UP  Return in about 3 months (around 1/3/2018).

## 2017-10-03 ENCOUNTER — OFFICE VISIT (OUTPATIENT)
Dept: HEPATOLOGY | Facility: CLINIC | Age: 57
End: 2017-10-03
Payer: MEDICARE

## 2017-10-03 ENCOUNTER — LAB VISIT (OUTPATIENT)
Dept: LAB | Facility: HOSPITAL | Age: 57
End: 2017-10-03
Payer: MEDICARE

## 2017-10-03 ENCOUNTER — PROCEDURE VISIT (OUTPATIENT)
Dept: HEPATOLOGY | Facility: CLINIC | Age: 57
End: 2017-10-03
Attending: INTERNAL MEDICINE
Payer: MEDICARE

## 2017-10-03 ENCOUNTER — OFFICE VISIT (OUTPATIENT)
Dept: ENDOCRINOLOGY | Facility: CLINIC | Age: 57
End: 2017-10-03
Payer: MEDICARE

## 2017-10-03 VITALS
SYSTOLIC BLOOD PRESSURE: 148 MMHG | HEART RATE: 76 BPM | WEIGHT: 157 LBS | DIASTOLIC BLOOD PRESSURE: 80 MMHG | HEIGHT: 64 IN | BODY MASS INDEX: 26.8 KG/M2

## 2017-10-03 VITALS
DIASTOLIC BLOOD PRESSURE: 87 MMHG | OXYGEN SATURATION: 98 % | HEART RATE: 72 BPM | TEMPERATURE: 97 F | BODY MASS INDEX: 26.91 KG/M2 | WEIGHT: 157.63 LBS | HEIGHT: 64 IN | SYSTOLIC BLOOD PRESSURE: 188 MMHG | RESPIRATION RATE: 18 BRPM

## 2017-10-03 DIAGNOSIS — I15.2 HYPERTENSION ASSOCIATED WITH DIABETES: Chronic | ICD-10-CM

## 2017-10-03 DIAGNOSIS — K76.0 HEPATIC STEATOSIS: Primary | ICD-10-CM

## 2017-10-03 DIAGNOSIS — I25.83 CORONARY ARTERY DISEASE DUE TO LIPID RICH PLAQUE: Chronic | ICD-10-CM

## 2017-10-03 DIAGNOSIS — E11.51 TYPE 2 DIABETES MELLITUS WITH DIABETIC PERIPHERAL ANGIOPATHY WITHOUT GANGRENE, WITH LONG-TERM CURRENT USE OF INSULIN: Chronic | ICD-10-CM

## 2017-10-03 DIAGNOSIS — Z79.4 TYPE 2 DIABETES MELLITUS WITH DIABETIC PERIPHERAL ANGIOPATHY WITHOUT GANGRENE, WITH LONG-TERM CURRENT USE OF INSULIN: Chronic | ICD-10-CM

## 2017-10-03 DIAGNOSIS — E11.59 HYPERTENSION ASSOCIATED WITH DIABETES: Chronic | ICD-10-CM

## 2017-10-03 DIAGNOSIS — E11.51 TYPE 2 DIABETES MELLITUS WITH DIABETIC PERIPHERAL ANGIOPATHY WITHOUT GANGRENE, WITH LONG-TERM CURRENT USE OF INSULIN: Primary | Chronic | ICD-10-CM

## 2017-10-03 DIAGNOSIS — K76.0 FATTY LIVER: ICD-10-CM

## 2017-10-03 DIAGNOSIS — Z79.4 TYPE 2 DIABETES MELLITUS WITH DIABETIC PERIPHERAL ANGIOPATHY WITHOUT GANGRENE, WITH LONG-TERM CURRENT USE OF INSULIN: Primary | Chronic | ICD-10-CM

## 2017-10-03 DIAGNOSIS — I25.10 CORONARY ARTERY DISEASE DUE TO LIPID RICH PLAQUE: Chronic | ICD-10-CM

## 2017-10-03 DIAGNOSIS — K76.0 HEPATIC STEATOSIS: ICD-10-CM

## 2017-10-03 DIAGNOSIS — E11.42 DIABETIC PERIPHERAL NEUROPATHY ASSOCIATED WITH TYPE 2 DIABETES MELLITUS: ICD-10-CM

## 2017-10-03 DIAGNOSIS — E78.2 MIXED HYPERLIPIDEMIA: Chronic | ICD-10-CM

## 2017-10-03 DIAGNOSIS — I65.23 CAROTID STENOSIS, BILATERAL: Chronic | ICD-10-CM

## 2017-10-03 PROCEDURE — 82985 ASSAY OF GLYCATED PROTEIN: CPT

## 2017-10-03 PROCEDURE — 36415 COLL VENOUS BLD VENIPUNCTURE: CPT

## 2017-10-03 PROCEDURE — 99204 OFFICE O/P NEW MOD 45 MIN: CPT | Mod: S$GLB,,, | Performed by: INTERNAL MEDICINE

## 2017-10-03 PROCEDURE — 99999 PR PBB SHADOW E&M-EST. PATIENT-LVL III: CPT | Mod: PBBFAC,,, | Performed by: INTERNAL MEDICINE

## 2017-10-03 PROCEDURE — 99999 PR PBB SHADOW E&M-EST. PATIENT-LVL IV: CPT | Mod: PBBFAC,,, | Performed by: NURSE PRACTITIONER

## 2017-10-03 PROCEDURE — 99214 OFFICE O/P EST MOD 30 MIN: CPT | Mod: S$GLB,,, | Performed by: NURSE PRACTITIONER

## 2017-10-03 PROCEDURE — 91200 LIVER ELASTOGRAPHY: CPT | Mod: S$GLB,,, | Performed by: INTERNAL MEDICINE

## 2017-10-03 RX ORDER — INSULIN GLARGINE 300 [IU]/ML
42 INJECTION, SOLUTION SUBCUTANEOUS 2 TIMES DAILY
Qty: 6 SYRINGE | Refills: 11
Start: 2017-10-03 | End: 2017-11-27 | Stop reason: SDUPTHER

## 2017-10-03 NOTE — LETTER
October 3, 2017      Jasbir Haney MD  [address] [facility]  [place] [address]           [facility] - Hepatology  [address]  [place] [address]  Phone: [phone]  Fax: [phone]          Patient: Mariann Huff   MR Number: [id number]   YOB: 1960   Date of Visit: 10/3/2017       Dear Dr. Jasbir Haney:    Thank you for referring Mariann Huff to me for evaluation. Attached you will find relevant portions of my assessment and plan of care.    If you have questions, please do not hesitate to call me. I look forward to following Mariann Huff along with you.    Sincerely,    Adwoa Antunez MD    Enclosure  CC:  No Recipients    If you would like to receive this communication electronically, please contact [email or web address] or [phone] to request more information on [employer] access.    For providers and/or their staff who would like to refer a patient to [facility], please contact us through our one-stop-shop provider referral line, Esmer Campbell, at [phone].    If you feel you have received this communication in error or would no longer like to receive these types of communications, please e-mail [email or web address]

## 2017-10-03 NOTE — PROGRESS NOTES
Hepatology Consult Note    Referring provider: Dr. Jasbir Haney    Chief complaint:   Chief Complaint   Patient presents with    Fatty Liver       HPI:  Mariann Huff is a 57 y.o. female that presents to hepatology clinic for consultation of fatty liver.  She is alone.    The patient has no history of chronic liver disease.  She has not experienced symptoms of chronic liver disease.  There is no family history of liver disease.  The patient has multiple risk factors for fatty liver including DM, hypertension, and hyperlipidemia.  She has a history of CAD and NSTEMI with PCI.  She reports no history of heavy alcohol use.  There are no medications outside of those prescribed, denies OTCs and herbals.      Patient Active Problem List   Diagnosis    Hypertension associated with diabetes    Hyperlipidemia    CAD (coronary artery disease)    Sleep apnea    Carotid stenosis, bilateral    Obesity    Non compliance with medical treatment    Coronary stent restenosis    S/P coronary artery stent placement    Nuclear sclerosis - Right Eye    Primary localized osteoarthrosis, lower leg    Chronic sinusitis    PSC (posterior subcapsular cataract), right    NPDR (nonproliferative diabetic retinopathy)    DME (diabetic macular edema)    Refractive error    Pseudophakia    Senile nuclear sclerosis    Type 2 diabetes mellitus with diabetic peripheral angiopathy without gangrene    Diabetic peripheral neuropathy associated with type 2 diabetes mellitus    Dysphagia    Cervical arthritis    Neurogenic claudication    Lumbar herniated disc    Type 2 diabetes mellitus with insulin deficiency    Closed nondisplaced fracture of fifth metatarsal bone of right foot with routine healing       Past Medical History:   Diagnosis Date    Anticoagulant long-term use     Asthma     Back pain     CAD (coronary artery disease)     s/p stentimg 2003 (2),2009 (1)    Carotid artery stenosis     Diabetes mellitus type 2  in obese     HTN (hypertension), benign     Hyperlipidemia     Keloid cicatrix     NPDR (nonproliferative diabetic retinopathy) 2015    NSTEMI (non-ST elevated myocardial infarction)     Nuclear sclerosis - Right Eye 3/18/2014    Primary localized osteoarthrosis, lower leg 2014    Sleep apnea        Past Surgical History:   Procedure Laterality Date    CARDIAC CATHETERIZATION      cataract extraction left eye      cataracts       SECTION, LOW TRANSVERSE      CORONARY ANGIOPLASTY      EXCISION TURBINATE, SUBMUCOUS      HAND SURGERY Left     HAND SURGERY Right     torn ligament repair/ Dr. Yeboah    HYSTERECTOMY      left foot surgery      left wrist surgery      NASAL SEPTUM SURGERY  5/7/15    rt elbow surgery      S/P LAD COATED STENT  2010    6 total     S/P OM1 STENT  2003    SINUS SURGERY      F.E.S.S.    TUBAL LIGATION         Family History   Problem Relation Age of Onset    Diabetes Mother     Heart disease Mother     Diabetes Father     Leukemia Father      leukemia    Heart attack Father     Diabetes Sister     Diabetes Brother     Diabetes Sister     Colon cancer Neg Hx     Inflammatory bowel disease Neg Hx     Melanoma Neg Hx     Psoriasis Neg Hx     Lupus Neg Hx     Eczema Neg Hx     Acne Neg Hx     Amblyopia Neg Hx     Blindness Neg Hx     Cancer Neg Hx     Cataracts Neg Hx     Glaucoma Neg Hx     Hypertension Neg Hx     Macular degeneration Neg Hx     Retinal detachment Neg Hx     Strabismus Neg Hx     Stroke Neg Hx     Thyroid disease Neg Hx     Heart failure Neg Hx     Hyperlipidemia Neg Hx        Social History     Social History    Marital status:      Spouse name: Shamir    Number of children: 2    Years of education: N/A     Occupational History    [facility]     Social History Main Topics    Smoking status: Never Smoker     Smokeless tobacco: Never Used    Alcohol use No    Drug use: No    Sexual activity: Yes     Partners: Male     Birth control/ protection: Post-menopausal      Comment:      Other Topics Concern    Are You Pregnant Or Think You May Be? No    Breast-Feeding No     Social History Narrative    . 2 children.        Current Outpatient Prescriptions   Medication Sig Dispense Refill    albuterol 90 mcg/actuation inhaler Inhale 1-2 puffs into the lungs every 6 (six) hours as needed. 1 each 0    amlodipine (NORVASC) 10 MG tablet TAKE 1 TABLET (10 MG TOTAL) BY MOUTH ONCE DAILY. 30 tablet 11    aspirin 81 mg Tab Take 81 mg by mouth. 1 Tablet Oral Every day      atorvastatin (LIPITOR) 40 MG tablet TAKE 1 TABLET (40 MG TOTAL) BY MOUTH ONCE DAILY. 30 tablet 11    atorvastatin (LIPITOR) 40 MG tablet TAKE 1 TABLET (40 MG TOTAL) BY MOUTH ONCE DAILY. 30 tablet 11    cyclobenzaprine (FLEXERIL) 10 MG tablet TAKE 1 TABLET BY MOUTH 3 TIMES A DAY AS NEEDED FOR MUSCLE SPASMS. NO WORKING OR DRIVING ON THIS MED 45 tablet 5    EFFIENT 10 mg Tab TAKE 1 TABLET (10 MG TOTAL) BY MOUTH ONCE DAILY. 30 tablet 11    esomeprazole (NEXIUM) 40 MG capsule Take 1 capsule (40 mg total) by mouth before breakfast. 90 capsule 3    fenofibrate micronized (LOFIBRA) 134 MG Cap TAKE 1 CAPSULE (134 MG TOTAL) BY MOUTH BEFORE BREAKFAST. 30 capsule 10    hydrocodone-acetaminophen 7.5-325mg (NORCO) 7.5-325 mg per tablet Take 1 tablet by mouth every 8 (eight) hours as needed for Pain. 30 tablet 0    insulin glargine, TOUJEO, (TOUJEO SOLOSTAR) 300 unit/mL (1.5 mL) InPn pen Inject 42 Units into the skin 2 (two) times daily. 6 Syringe 11    INVOKANA 300 mg Tab tablet Take 1 tablet (300 mg total) by mouth once daily. 30 tablet 11    losartan (COZAAR) 100 MG tablet TAKE 1 TABLET (100 MG TOTAL) BY MOUTH ONCE DAILY. 30 tablet 11    metoprolol succinate (TOPROL-XL) 100 MG 24 hr tablet TAKE 1 TABLET (100 MG TOTAL) BY MOUTH ONCE DAILY. 30 tablet  "11    nitroGLYCERIN (NITROSTAT) 0.4 MG SL tablet Take one every 2-3 min till chestpain relief for 3 times and if still no relief, call MD or come to ED 30 tablet 11    nystatin (MYCOSTATIN) cream APPLY TOPICALLY 3 (THREE) TIMES DAILY. 30 g 3    omega-3 acid ethyl esters (LOVAZA) 1 gram capsule Take 1 g by mouth once daily.       tramadol (ULTRAM) 50 mg tablet Take 1 tablet (50 mg total) by mouth 3 (three) times daily as needed for Pain. 60 tablet 1    zolpidem (AMBIEN) 5 MG Tab TAKE 1 TABLET (5 MG TOTAL) BY MOUTH NIGHTLY AS NEEDED. 30 tablet 2    ACCU-CHEK EDIN PLUS METER Misc       BD INSULIN PEN NEEDLE UF MINI 31 x 3/16 " Ndle USE 4 TIMES A  each 11    blood sugar diagnostic (ACCU-CHEK EDIN PLUS TEST STRP) Strp TEST BLOOD SUGARS 4 TIMES DAILY 150 strip 11    insulin aspart (NOVOLOG) 100 unit/mL InPn pen 30 U in am, 30 units noon, 30 units pm; 150-200 +2, 201-250 + 4, 251-300 + 6, 301-350 +8, >350 +10 and call MD 20 mL 11    lancets 30 gauge Misc       pen needle, diabetic (BD ULTRA-FINE GRABIEL PEN NEEDLES) 32 gauge x 5/32" Ndle For use with insulin pens daily 4 times a day 100 each 11    SURE COMFORT PEN NEEDLE 31 gauge x 5/16" Ndle        No current facility-administered medications for this visit.        Review of patient's allergies indicates:  No Known Allergies    Review of Systems   Constitutional: Negative for chills, fever, malaise/fatigue and weight loss.   Eyes: Negative.    Respiratory: Negative for cough and shortness of breath.    Cardiovascular: Negative for chest pain and leg swelling.   Gastrointestinal: Negative for abdominal pain, heartburn, nausea and vomiting.   Musculoskeletal: Negative for joint pain and myalgias.   Skin: Negative for itching and rash.   Neurological: Negative for dizziness, focal weakness and headaches.   Endo/Heme/Allergies: Does not bruise/bleed easily.   Psychiatric/Behavioral: Negative for depression.       Vitals:    10/03/17 0834   BP: (!) 188/87 " "  Pulse: 72   Resp: 18   Temp: 96.8 °F (36 °C)   TempSrc: Oral   SpO2: 98%   Weight: 71.5 kg (157 lb 10.1 oz)   Height: 5' 4" (1.626 m)       Physical Exam   Constitutional: She is oriented to person, place, and time. She appears well-developed and well-nourished. No distress.   HENT:   Head: Normocephalic and atraumatic.   Mouth/Throat: Oropharynx is clear and moist.   Eyes: EOM are normal. Pupils are equal, round, and reactive to light. No scleral icterus.   Neck: Normal range of motion. Neck supple. No thyromegaly present.   Cardiovascular: Normal rate, regular rhythm and normal heart sounds.  Exam reveals no gallop and no friction rub.    No murmur heard.  Pulmonary/Chest: Effort normal. No respiratory distress. She has no wheezes. She has no rales.   Abdominal: Soft. Bowel sounds are normal. She exhibits no distension. There is no tenderness.   Musculoskeletal: Normal range of motion. She exhibits no edema.   Lymphadenopathy:     She has no cervical adenopathy.   Neurological: She is alert and oriented to person, place, and time. No cranial nerve deficit.   Skin: Skin is warm and dry. No rash noted.   Psychiatric: She has a normal mood and affect. Her behavior is normal.   Vitals reviewed.      Lab Results   Component Value Date    ALT 15 09/19/2017    AST 15 09/19/2017    ALKPHOS 114 09/19/2017    BILITOT 0.8 09/19/2017       Lab Results   Component Value Date    WBC 8.07 03/06/2017    HGB 11.2 (L) 03/06/2017    HCT 36.3 (L) 03/06/2017    MCV 83 03/06/2017     03/06/2017       Lab Results   Component Value Date     (H) 09/19/2017    K 3.9 09/19/2017     09/19/2017    CO2 29 09/19/2017    BUN 8 09/19/2017    CREATININE 0.8 09/19/2017    CALCIUM 9.9 09/19/2017    ANIONGAP 10 09/19/2017    ESTGFRAFRICA >60.0 09/19/2017    EGFRNONAA >60.0 09/19/2017       Imaging: US with hepatomegaly and hepatic steatosis     Assessment:  57 y.o. female presenting with risk factors for NAFLD and hepatic " steatosis on imaging in the setting of normal liver enzymes.      NAFLD: Patient with normal liver enzymes in the setting of hepatic steatosis.  Most consistent with benign fatty liver without PFEIFFER.  No serologic work-up required at this time given normal liver tests.  Recommend fibroscan to confirm absence of advanced fibrosis.  If normal, no hepatology clinic follow-up required and patient educated on the importance of healthy lifestyle measures including increased physical activity and weight loss.      RTC based on results

## 2017-10-03 NOTE — PATIENT INSTRUCTIONS
Your liver tests are normal but there is fat in your liver.    We will obtain fibroscan to assess for scar tissue in the liver.    If there is no significant scar tissue, you will not need to follow-up in liver clinic.    Recommend healthy lifestyle with increased physical activity and good control of risk factors for fatty liver.

## 2017-10-03 NOTE — PROCEDURES
Procedures     Fibroscan Procedure     Name: Mariann Huff  Date of Procedure : 10/03/2017   :: Adwoa Antunez MD  Diagnosis: NAFLD    Probe: M    Fibroscan readin.7 KPa    Fibrosis:F 0-1     CAP readin dB/m    Steatosis: :S3

## 2017-10-06 ENCOUNTER — TELEPHONE (OUTPATIENT)
Dept: OPTOMETRY | Facility: CLINIC | Age: 57
End: 2017-10-06

## 2017-10-06 LAB — FRUCTOSAMINE SERPL-SCNC: 328 UMOL /L (ref 0–285)

## 2017-10-06 NOTE — TELEPHONE ENCOUNTER
Called to complete courtsey call pt informed that she called to cancel appt until 10/10/17 updated in courtsey call

## 2017-10-09 ENCOUNTER — TELEPHONE (OUTPATIENT)
Dept: OPTOMETRY | Facility: CLINIC | Age: 57
End: 2017-10-09

## 2017-10-09 NOTE — TELEPHONE ENCOUNTER
Called pt to confirm appt pt confirmed reminded of appt time,date and location advised to be on time no later than 15 min after scheduled appt time advised to call [phone] to reschedule if needed.

## 2017-10-10 ENCOUNTER — OFFICE VISIT (OUTPATIENT)
Dept: OPTOMETRY | Facility: CLINIC | Age: 57
End: 2017-10-10
Payer: MEDICARE

## 2017-10-10 DIAGNOSIS — H52.7 REFRACTIVE ERROR: ICD-10-CM

## 2017-10-10 DIAGNOSIS — E11.9 TYPE 2 DIABETES MELLITUS WITHOUT RETINOPATHY: Primary | ICD-10-CM

## 2017-10-10 DIAGNOSIS — Z96.1 PSEUDOPHAKIA: ICD-10-CM

## 2017-10-10 PROCEDURE — 99999 PR PBB SHADOW E&M-EST. PATIENT-LVL I: CPT | Mod: PBBFAC,,, | Performed by: OPTOMETRIST

## 2017-10-10 PROCEDURE — 92015 DETERMINE REFRACTIVE STATE: CPT | Mod: S$GLB,,, | Performed by: OPTOMETRIST

## 2017-10-10 PROCEDURE — 92014 COMPRE OPH EXAM EST PT 1/>: CPT | Mod: S$GLB,,, | Performed by: OPTOMETRIST

## 2017-10-10 NOTE — PROGRESS NOTES
HPI     Diabetic eye exam  Hemoglobin A1C       Date                     Value               Ref Range             Status                06/12/2017               9.9 (H)             4.0 - 5.6 %           Final              Comment:    According to ADA guidelines, hemoglobin A1c <7.0% represents  optimal   control in non-pregnant diabetic patients. Different  metrics may apply to   specific patient populations.   Standards of Medical Care in   Diabetes-2016.  For the purpose of screening for the presence of   diabetes:  <5.7%     Consistent with the absence of diabetes  5.7-6.4%    Consistent with increasing risk for diabetes   (prediabetes)  >or=6.5%    Consistent with diabetes  Currently, no consensus exists for use of   hemoglobin A1c  for diagnosis of diabetes for children.  This Hemoglobin   A1c assay has significant interference with fetal   hemoglobin   (HbF).   The results are invalid for patients with abnormal amounts of   HbF,     including those with known Hereditary Persistence   of Fetal Hemoglobin.   Heterozygous hemoglobin variants (HbAS, HbAC,   HbAD, HbAE, HbA2) do not   significantly interfere with this assay;   however, presence of multiple   variants in a sample may impact the %   interference.         02/27/2017               10.3 (H)            4.5 - 6.2 %           Final              Comment:    According to ADA guidelines, hemoglobin A1C <7.0% represents  optimal   control in non-pregnant diabetic patients.  Different  metrics may apply   to specific populations.   Standards of Medical Care in Diabetes -   2016.  For the purpose of screening for the presence of diabetes:  <5.7%       Consistent with the absence of diabetes  5.7-6.4%  Consistent with   increasing risk for diabetes   (prediabetes)  >or=6.5%  Consistent with   diabetes  Currently no consensus exists for use of hemoglobin A1C  for   diagnosis of diabetes for children.         11/18/2016               10.3 (H)            4.5 - 6.2  %           Final              Comment:    According to ADA guidelines, hemoglobin A1C <7.0% represents  optimal   control in non-pregnant diabetic patients.  Different  metrics may apply   to specific populations.   Standards of Medical Care in Diabetes -   2016.  For the purpose of screening for the presence of diabetes:  <5.7%       Consistent with the absence of diabetes  5.7-6.4%  Consistent with   increasing risk for diabetes   (prediabetes)  >or=6.5%  Consistent with   diabetes  Currently no consensus exists for use of hemoglobin A1C  for   diagnosis of diabetes for children.    ----------    Last edited by Good Frost, OD on 10/10/2017  8:49 AM. (History)              Assessment /Plan     For exam results, see Encounter Report.    Type 2 diabetes mellitus without retinopathy    Pseudophakia    Refractive error      1. No diabetic retinopathy, no csme. Return in 1 year for dilated eye exam.  2. Monitor condition. Patient to report any changes. RTC 1 year recheck.  3. Spec Rx given. Different lens options discussed with patient. RTC 1 year full exam.

## 2017-10-25 ENCOUNTER — TELEPHONE (OUTPATIENT)
Dept: CARDIOLOGY | Facility: CLINIC | Age: 57
End: 2017-10-25

## 2017-10-25 ENCOUNTER — LAB VISIT (OUTPATIENT)
Dept: LAB | Facility: HOSPITAL | Age: 57
End: 2017-10-25
Attending: INTERNAL MEDICINE
Payer: MEDICARE

## 2017-10-25 DIAGNOSIS — E78.2 MIXED HYPERLIPIDEMIA: Chronic | ICD-10-CM

## 2017-10-25 DIAGNOSIS — I65.23 CAROTID STENOSIS, BILATERAL: Chronic | ICD-10-CM

## 2017-10-25 DIAGNOSIS — I25.10 CORONARY ARTERY DISEASE DUE TO LIPID RICH PLAQUE: Chronic | ICD-10-CM

## 2017-10-25 DIAGNOSIS — E11.42 DIABETIC PERIPHERAL NEUROPATHY ASSOCIATED WITH TYPE 2 DIABETES MELLITUS: ICD-10-CM

## 2017-10-25 DIAGNOSIS — I25.83 CORONARY ARTERY DISEASE DUE TO LIPID RICH PLAQUE: Chronic | ICD-10-CM

## 2017-10-25 LAB
ALBUMIN SERPL BCP-MCNC: 3.6 G/DL
ALP SERPL-CCNC: 110 U/L
ALT SERPL W/O P-5'-P-CCNC: 14 U/L
ANION GAP SERPL CALC-SCNC: 14 MMOL/L
AST SERPL-CCNC: 15 U/L
BILIRUB SERPL-MCNC: 0.7 MG/DL
BUN SERPL-MCNC: 15 MG/DL
CALCIUM SERPL-MCNC: 10.3 MG/DL
CHLORIDE SERPL-SCNC: 107 MMOL/L
CHOLEST SERPL-MCNC: 206 MG/DL
CHOLEST/HDLC SERPL: 4.2 {RATIO}
CO2 SERPL-SCNC: 23 MMOL/L
CREAT SERPL-MCNC: 1 MG/DL
EST. GFR  (AFRICAN AMERICAN): >60 ML/MIN/1.73 M^2
EST. GFR  (NON AFRICAN AMERICAN): >60 ML/MIN/1.73 M^2
ESTIMATED AVG GLUCOSE: 240 MG/DL
GLUCOSE SERPL-MCNC: 234 MG/DL
HBA1C MFR BLD HPLC: 10 %
HDLC SERPL-MCNC: 49 MG/DL
HDLC SERPL: 23.8 %
LDLC SERPL CALC-MCNC: 136.4 MG/DL
NONHDLC SERPL-MCNC: 157 MG/DL
POTASSIUM SERPL-SCNC: 3.9 MMOL/L
PROT SERPL-MCNC: 7.8 G/DL
SODIUM SERPL-SCNC: 144 MMOL/L
TRIGL SERPL-MCNC: 103 MG/DL
TSH SERPL DL<=0.005 MIU/L-ACNC: 1.3 UIU/ML

## 2017-10-25 PROCEDURE — 36415 COLL VENOUS BLD VENIPUNCTURE: CPT | Mod: PO

## 2017-10-25 PROCEDURE — 84443 ASSAY THYROID STIM HORMONE: CPT

## 2017-10-25 PROCEDURE — 80061 LIPID PANEL: CPT

## 2017-10-25 PROCEDURE — 83036 HEMOGLOBIN GLYCOSYLATED A1C: CPT

## 2017-10-25 PROCEDURE — 80053 COMPREHEN METABOLIC PANEL: CPT

## 2017-10-25 NOTE — TELEPHONE ENCOUNTER
----- Message from Adelaide Aguilar MD sent at 10/25/2017  3:50 PM CDT -----  Please mail patient the blood work results and inform the patient that we will discuss it in detail during the upcoming visit. She really needs to work on her diabetes

## 2017-10-28 RX ORDER — PRASUGREL 10 MG/1
TABLET, FILM COATED ORAL
Qty: 30 TABLET | Refills: 10 | Status: SHIPPED | OUTPATIENT
Start: 2017-10-28 | End: 2018-12-06 | Stop reason: SDUPTHER

## 2017-11-01 ENCOUNTER — OFFICE VISIT (OUTPATIENT)
Dept: CARDIOLOGY | Facility: CLINIC | Age: 57
End: 2017-11-01
Payer: MEDICARE

## 2017-11-01 VITALS
SYSTOLIC BLOOD PRESSURE: 160 MMHG | HEIGHT: 64 IN | BODY MASS INDEX: 27.45 KG/M2 | DIASTOLIC BLOOD PRESSURE: 90 MMHG | WEIGHT: 160.81 LBS | HEART RATE: 80 BPM

## 2017-11-01 DIAGNOSIS — E11.42 DIABETIC PERIPHERAL NEUROPATHY ASSOCIATED WITH TYPE 2 DIABETES MELLITUS: ICD-10-CM

## 2017-11-01 DIAGNOSIS — G47.30 SLEEP APNEA, UNSPECIFIED TYPE: Chronic | ICD-10-CM

## 2017-11-01 DIAGNOSIS — E78.2 MIXED HYPERLIPIDEMIA: Chronic | ICD-10-CM

## 2017-11-01 DIAGNOSIS — E11.51 TYPE 2 DIABETES MELLITUS WITH DIABETIC PERIPHERAL ANGIOPATHY WITHOUT GANGRENE, WITH LONG-TERM CURRENT USE OF INSULIN: Chronic | ICD-10-CM

## 2017-11-01 DIAGNOSIS — K76.0 FATTY LIVER DISEASE, NONALCOHOLIC: ICD-10-CM

## 2017-11-01 DIAGNOSIS — T82.855S CORONARY STENT RESTENOSIS, SEQUELA: ICD-10-CM

## 2017-11-01 DIAGNOSIS — I25.10 CORONARY ARTERY DISEASE DUE TO LIPID RICH PLAQUE: Primary | Chronic | ICD-10-CM

## 2017-11-01 DIAGNOSIS — I65.23 CAROTID STENOSIS, BILATERAL: Chronic | ICD-10-CM

## 2017-11-01 DIAGNOSIS — Z91.199 NON COMPLIANCE WITH MEDICAL TREATMENT: Chronic | ICD-10-CM

## 2017-11-01 DIAGNOSIS — E11.59 HYPERTENSION ASSOCIATED WITH DIABETES: Chronic | ICD-10-CM

## 2017-11-01 DIAGNOSIS — Z79.4 TYPE 2 DIABETES MELLITUS WITH DIABETIC PERIPHERAL ANGIOPATHY WITHOUT GANGRENE, WITH LONG-TERM CURRENT USE OF INSULIN: Chronic | ICD-10-CM

## 2017-11-01 DIAGNOSIS — I25.83 CORONARY ARTERY DISEASE DUE TO LIPID RICH PLAQUE: Primary | Chronic | ICD-10-CM

## 2017-11-01 DIAGNOSIS — I15.2 HYPERTENSION ASSOCIATED WITH DIABETES: Chronic | ICD-10-CM

## 2017-11-01 PROCEDURE — 99999 PR PBB SHADOW E&M-EST. PATIENT-LVL III: CPT | Mod: PBBFAC,,, | Performed by: INTERNAL MEDICINE

## 2017-11-01 PROCEDURE — 99214 OFFICE O/P EST MOD 30 MIN: CPT | Mod: S$GLB,,, | Performed by: INTERNAL MEDICINE

## 2017-11-01 RX ORDER — CHLORTHALIDONE 25 MG/1
25 TABLET ORAL DAILY
Qty: 30 TABLET | Refills: 11 | Status: SHIPPED | OUTPATIENT
Start: 2017-11-01 | End: 2018-12-07 | Stop reason: SDUPTHER

## 2017-11-01 NOTE — PROGRESS NOTES
"Subjective:   Patient ID:  Mariann Huff is a 57 y.o. female who presents for follow-up of Hypertension      HPI: NO CV symptoms. Followed by endocrinology and hepatology for uncontrolled Diabetes and fatty liver.  Admits to dietary indiscretions and "addiction" to coca cola.  Lost 5 lbs since the last visit.  BP is not controlled. Lipids are not at goal.    Lab Results   Component Value Date     10/25/2017    K 3.9 10/25/2017     10/25/2017    CO2 23 10/25/2017    BUN 15 10/25/2017    CREATININE 1.0 10/25/2017     (H) 10/25/2017    HGBA1C 10.0 (H) 10/25/2017    MG 2.6 10/09/2015    AST 15 10/25/2017    ALT 14 10/25/2017    ALBUMIN 3.6 10/25/2017    PROT 7.8 10/25/2017    BILITOT 0.7 10/25/2017    WBC 8.07 03/06/2017    HGB 11.2 (L) 03/06/2017    HCT 36.3 (L) 03/06/2017    MCV 83 03/06/2017     03/06/2017    INR 0.9 02/17/2014    TSH 1.300 10/25/2017    CHOL 206 (H) 10/25/2017    HDL 49 10/25/2017    LDLCALC 136.4 10/25/2017    TRIG 103 10/25/2017       Review of Systems   Constitution: Negative.   HENT: Negative.    Eyes: Negative.    Cardiovascular: Negative.  Negative for chest pain, claudication, dyspnea on exertion, irregular heartbeat, leg swelling, near-syncope, palpitations and syncope.   Respiratory: Negative.  Negative for cough, shortness of breath, snoring and wheezing.    Endocrine: Negative.  Negative for cold intolerance, heat intolerance, polydipsia, polyphagia and polyuria.   Skin: Negative.    Musculoskeletal: Positive for back pain.   Gastrointestinal: Positive for heartburn.   Genitourinary: Negative.    Neurological: Negative.    Psychiatric/Behavioral: Negative.        Objective:   Physical Exam   Constitutional: She is oriented to person, place, and time. She appears well-developed and well-nourished.   BP (!) 160/90   Pulse 80   Ht 5' 4" (1.626 m)   Wt 73 kg (160 lb 13.2 oz)   BMI 27.61 kg/m²      HENT:   Head: Normocephalic.   Eyes: Pupils are equal, round, " and reactive to light.   Neck: Normal range of motion. Neck supple. Carotid bruit is not present. Thyromegaly present.   Cardiovascular: Normal rate, regular rhythm, normal heart sounds and intact distal pulses.  Exam reveals no gallop and no friction rub.    No murmur heard.  Pulses:       Carotid pulses are 2+ on the right side, and 2+ on the left side.       Radial pulses are 2+ on the right side, and 2+ on the left side.        Femoral pulses are 2+ on the right side, and 2+ on the left side.       Popliteal pulses are 2+ on the right side, and 2+ on the left side.        Dorsalis pedis pulses are 2+ on the right side, and 2+ on the left side.        Posterior tibial pulses are 2+ on the right side, and 2+ on the left side.   Pulmonary/Chest: Effort normal and breath sounds normal. No respiratory distress. She has no wheezes. She has no rales. She exhibits no tenderness.   Abdominal: Soft. Bowel sounds are normal.   obese   Musculoskeletal: Normal range of motion. She exhibits no edema.   Neurological: She is alert and oriented to person, place, and time.   Skin: Skin is warm and dry.   Psychiatric: She has a normal mood and affect.   Nursing note and vitals reviewed.        Assessment and Plan:     Problem List Items Addressed This Visit        Cardiology Problems    Hypertension associated with diabetes (Chronic)    Relevant Medications    chlorthalidone (HYGROTEN) 25 MG Tab    Other Relevant Orders    Basic metabolic panel    Hyperlipidemia (Chronic)    CAD (coronary artery disease) - Primary (Chronic)    Carotid stenosis, bilateral (Chronic)    Type 2 diabetes mellitus with diabetic peripheral angiopathy without gangrene (Chronic)       Other    Sleep apnea (Chronic)    Non compliance with medical treatment (Chronic)    Coronary stent restenosis    Diabetic peripheral neuropathy associated with type 2 diabetes mellitus    Fatty liver disease, nonalcoholic          Patient's Medications   New Prescriptions     "CHLORTHALIDONE (HYGROTEN) 25 MG TAB    Take 1 tablet (25 mg total) by mouth once daily.   Previous Medications    ACCU-CHEK EDIN PLUS METER MISC        ALBUTEROL 90 MCG/ACTUATION INHALER    Inhale 1-2 puffs into the lungs every 6 (six) hours as needed.    AMLODIPINE (NORVASC) 10 MG TABLET    TAKE 1 TABLET (10 MG TOTAL) BY MOUTH ONCE DAILY.    ASPIRIN 81 MG TAB    Take 81 mg by mouth. 1 Tablet Oral Every day    ATORVASTATIN (LIPITOR) 40 MG TABLET    TAKE 1 TABLET (40 MG TOTAL) BY MOUTH ONCE DAILY.    BD INSULIN PEN NEEDLE UF MINI 31 X 3/16 " NDLE    USE 4 TIMES A DAY    BLOOD SUGAR DIAGNOSTIC (ACCU-CHEK EDIN PLUS TEST STRP) STRP    TEST BLOOD SUGARS 4 TIMES DAILY    CYCLOBENZAPRINE (FLEXERIL) 10 MG TABLET    TAKE 1 TABLET BY MOUTH 3 TIMES A DAY AS NEEDED FOR MUSCLE SPASMS. NO WORKING OR DRIVING ON THIS MED    ESOMEPRAZOLE (NEXIUM) 40 MG CAPSULE    Take 1 capsule (40 mg total) by mouth before breakfast.    FENOFIBRATE MICRONIZED (LOFIBRA) 134 MG CAP    TAKE 1 CAPSULE (134 MG TOTAL) BY MOUTH BEFORE BREAKFAST.    HYDROCODONE-ACETAMINOPHEN 7.5-325MG (NORCO) 7.5-325 MG PER TABLET    Take 1 tablet by mouth every 8 (eight) hours as needed for Pain.    INSULIN ASPART (NOVOLOG) 100 UNIT/ML INPN PEN    30 U in am, 30 units noon, 30 units pm; 150-200 +2, 201-250 + 4, 251-300 + 6, 301-350 +8, >350 +10 and call MD    INSULIN GLARGINE, TOUJEO, (TOUJEO SOLOSTAR) 300 UNIT/ML (1.5 ML) INPN PEN    Inject 42 Units into the skin 2 (two) times daily.    INVOKANA 300 MG TAB TABLET    Take 1 tablet (300 mg total) by mouth once daily.    LANCETS 30 GAUGE MISC        LOSARTAN (COZAAR) 100 MG TABLET    TAKE 1 TABLET (100 MG TOTAL) BY MOUTH ONCE DAILY.    METOPROLOL SUCCINATE (TOPROL-XL) 100 MG 24 HR TABLET    TAKE 1 TABLET (100 MG TOTAL) BY MOUTH ONCE DAILY.    NITROGLYCERIN (NITROSTAT) 0.4 MG SL TABLET    Take one every 2-3 min till chestpain relief for 3 times and if still no relief, call MD or come to ED    NYSTATIN (MYCOSTATIN) CREAM    " "APPLY TOPICALLY 3 (THREE) TIMES DAILY.    OMEGA-3 ACID ETHYL ESTERS (LOVAZA) 1 GRAM CAPSULE    Take 1 g by mouth once daily.     PEN NEEDLE, DIABETIC (BD ULTRA-FINE GRABIEL PEN NEEDLES) 32 GAUGE X 5/32" NDLE    For use with insulin pens daily 4 times a day    PRASUGREL (EFFIENT) 10 MG TAB    TAKE 1 TABLET (10 MG TOTAL) BY MOUTH ONCE DAILY.    SURE COMFORT PEN NEEDLE 31 GAUGE X 5/16" NDLE        TRAMADOL (ULTRAM) 50 MG TABLET    Take 1 tablet (50 mg total) by mouth 3 (three) times daily as needed for Pain.    ZOLPIDEM (AMBIEN) 5 MG TAB    TAKE 1 TABLET (5 MG TOTAL) BY MOUTH NIGHTLY AS NEEDED.   Modified Medications    No medications on file   Discontinued Medications    ATORVASTATIN (LIPITOR) 40 MG TABLET    TAKE 1 TABLET (40 MG TOTAL) BY MOUTH ONCE DAILY.     Add Diuretic and check BMP in 1 week.    Keep BP log and call if out of range.  Dietary compliance, weight loss, exercise discussed at length.  Return in about 6 months (around 5/1/2018).          "

## 2017-11-08 ENCOUNTER — LAB VISIT (OUTPATIENT)
Dept: LAB | Facility: HOSPITAL | Age: 57
End: 2017-11-08
Attending: INTERNAL MEDICINE
Payer: MEDICARE

## 2017-11-08 DIAGNOSIS — I15.2 HYPERTENSION ASSOCIATED WITH DIABETES: Chronic | ICD-10-CM

## 2017-11-08 DIAGNOSIS — E11.59 HYPERTENSION ASSOCIATED WITH DIABETES: Chronic | ICD-10-CM

## 2017-11-08 LAB
ANION GAP SERPL CALC-SCNC: 11 MMOL/L
BUN SERPL-MCNC: 30 MG/DL
CALCIUM SERPL-MCNC: 10.5 MG/DL
CHLORIDE SERPL-SCNC: 102 MMOL/L
CO2 SERPL-SCNC: 29 MMOL/L
CREAT SERPL-MCNC: 1.2 MG/DL
EST. GFR  (AFRICAN AMERICAN): 58.4 ML/MIN/1.73 M^2
EST. GFR  (NON AFRICAN AMERICAN): 50.6 ML/MIN/1.73 M^2
GLUCOSE SERPL-MCNC: 300 MG/DL
POTASSIUM SERPL-SCNC: 4.3 MMOL/L
SODIUM SERPL-SCNC: 142 MMOL/L

## 2017-11-08 PROCEDURE — 36415 COLL VENOUS BLD VENIPUNCTURE: CPT | Mod: PO

## 2017-11-08 PROCEDURE — 80048 BASIC METABOLIC PNL TOTAL CA: CPT

## 2017-11-09 ENCOUNTER — TELEPHONE (OUTPATIENT)
Dept: CARDIOLOGY | Facility: CLINIC | Age: 57
End: 2017-11-09

## 2017-11-09 DIAGNOSIS — Z79.4 TYPE 2 DIABETES MELLITUS WITH DIABETIC PERIPHERAL ANGIOPATHY WITHOUT GANGRENE, WITH LONG-TERM CURRENT USE OF INSULIN: ICD-10-CM

## 2017-11-09 DIAGNOSIS — E11.51 TYPE 2 DIABETES MELLITUS WITH DIABETIC PERIPHERAL ANGIOPATHY WITHOUT GANGRENE, WITH LONG-TERM CURRENT USE OF INSULIN: ICD-10-CM

## 2017-11-09 NOTE — TELEPHONE ENCOUNTER
----- Message from Adelaide Aguilar MD sent at 11/9/2017 12:37 PM CST -----  Please inform the patient that blood looks ok except for very high sugar. She should contact her PCP and address it.

## 2017-11-10 RX ORDER — INSULIN GLARGINE 300 U/ML
84 INJECTION, SOLUTION SUBCUTANEOUS DAILY
Qty: 6 SYRINGE | Refills: 11 | Status: SHIPPED | OUTPATIENT
Start: 2017-11-10 | End: 2017-11-27

## 2017-11-27 ENCOUNTER — OFFICE VISIT (OUTPATIENT)
Dept: INTERNAL MEDICINE | Facility: CLINIC | Age: 57
End: 2017-11-27
Payer: MEDICARE

## 2017-11-27 VITALS
HEIGHT: 64 IN | TEMPERATURE: 98 F | DIASTOLIC BLOOD PRESSURE: 82 MMHG | HEART RATE: 79 BPM | WEIGHT: 158.31 LBS | BODY MASS INDEX: 27.03 KG/M2 | SYSTOLIC BLOOD PRESSURE: 143 MMHG

## 2017-11-27 DIAGNOSIS — E11.9 TYPE 2 DIABETES MELLITUS WITH INSULIN DEFICIENCY: ICD-10-CM

## 2017-11-27 DIAGNOSIS — M47.812 CERVICAL ARTHRITIS: Chronic | ICD-10-CM

## 2017-11-27 DIAGNOSIS — L84 CORN OF TOE: Primary | ICD-10-CM

## 2017-11-27 DIAGNOSIS — Z79.4 TYPE 2 DIABETES MELLITUS WITH DIABETIC PERIPHERAL ANGIOPATHY WITHOUT GANGRENE, WITH LONG-TERM CURRENT USE OF INSULIN: Chronic | ICD-10-CM

## 2017-11-27 DIAGNOSIS — E11.51 TYPE 2 DIABETES MELLITUS WITH DIABETIC PERIPHERAL ANGIOPATHY WITHOUT GANGRENE, WITH LONG-TERM CURRENT USE OF INSULIN: Chronic | ICD-10-CM

## 2017-11-27 PROCEDURE — G0008 ADMIN INFLUENZA VIRUS VAC: HCPCS | Mod: S$GLB,,, | Performed by: INTERNAL MEDICINE

## 2017-11-27 PROCEDURE — 90686 IIV4 VACC NO PRSV 0.5 ML IM: CPT | Mod: S$GLB,,, | Performed by: INTERNAL MEDICINE

## 2017-11-27 PROCEDURE — 99999 PR PBB SHADOW E&M-EST. PATIENT-LVL III: CPT | Mod: PBBFAC,,, | Performed by: INTERNAL MEDICINE

## 2017-11-27 PROCEDURE — 99214 OFFICE O/P EST MOD 30 MIN: CPT | Mod: S$GLB,,, | Performed by: INTERNAL MEDICINE

## 2017-11-27 RX ORDER — INSULIN GLARGINE 300 [IU]/ML
50 INJECTION, SOLUTION SUBCUTANEOUS 2 TIMES DAILY
Qty: 6 SYRINGE | Refills: 11 | Status: SHIPPED | OUTPATIENT
Start: 2017-11-27 | End: 2018-10-26 | Stop reason: SDUPTHER

## 2017-11-27 RX ORDER — INSULIN ASPART 100 [IU]/ML
INJECTION, SOLUTION INTRAVENOUS; SUBCUTANEOUS
Qty: 20 ML | Refills: 11
Start: 2017-11-27 | End: 2018-05-04

## 2017-11-27 NOTE — PROGRESS NOTES
Subjective:       Patient ID: Mariann Huff is a 57 y.o. female.    Chief Complaint: Other (left foot/middle toe turning dark color)    HPI     57-year-old female with cervical arthritis here for evaluation of left foot middle toe turning dark color.  She reports that her middle toe is turning a different color. She has an appointment with podiatry on the 13th.  Her shoes are not tight.  It has been going on a while - about a month.  It has been having sharp shooting pains from the toe.    Lab Results   Component Value Date    HGBA1C 10.0 (H) 10/25/2017   Tujeo 42 units twice a day, novolog 30 units three times a day and invokana 300 mg daily.      Review of Systems    Objective:      Physical Exam   Constitutional: She is oriented to person, place, and time. She appears well-developed and well-nourished.   HENT:   Head: Normocephalic and atraumatic.   Mouth/Throat: No oropharyngeal exudate.   Eyes: EOM are normal. Pupils are equal, round, and reactive to light. Right eye exhibits no discharge. Left eye exhibits no discharge. No scleral icterus.   Neck: Normal range of motion. Neck supple. No tracheal deviation present. No thyromegaly present.   Cardiovascular: Normal rate, regular rhythm and normal heart sounds.  Exam reveals no gallop and no friction rub.    No murmur heard.  Pulmonary/Chest: Effort normal and breath sounds normal. No respiratory distress. She has no wheezes. She has no rales. She exhibits no tenderness.   Abdominal: Soft. Bowel sounds are normal. She exhibits no distension and no mass. There is no tenderness. There is no rebound and no guarding.   Musculoskeletal: Normal range of motion. She exhibits no edema or tenderness.   Neurological: She is alert and oriented to person, place, and time.   Skin: Skin is warm and dry. No rash noted. No erythema. No pallor.   Psychiatric: She has a normal mood and affect. Her behavior is normal.   Vitals reviewed.      Assessment:       1. Corn of toe    2.  Type 2 diabetes mellitus with diabetic peripheral angiopathy without gangrene, with long-term current use of insulin    3. Type 2 diabetes mellitus with insulin deficiency    4. Cervical arthritis        Plan:       1.  Looks like patient has a corn from pressure.  Advised to wear looser shoes.  Keep appointment with podiatry on the 13th.  This is not currently infected.  2/3.  Increase Toujeo to 50 units twice a day.  Increase NovoLog to 35 units 3 times a day + scale insulin.  Follow-up with endocrine on January 5 as planned.  4.  Monitor.

## 2017-12-13 ENCOUNTER — HOSPITAL ENCOUNTER (OUTPATIENT)
Dept: RADIOLOGY | Facility: HOSPITAL | Age: 57
Discharge: HOME | End: 2017-12-13
Attending: PODIATRIST
Payer: MEDICARE

## 2017-12-13 ENCOUNTER — OFFICE VISIT (OUTPATIENT)
Dept: PODIATRY | Facility: CLINIC | Age: 57
End: 2017-12-13
Payer: MEDICARE

## 2017-12-13 VITALS
SYSTOLIC BLOOD PRESSURE: 149 MMHG | WEIGHT: 158 LBS | HEIGHT: 64 IN | HEART RATE: 97 BPM | BODY MASS INDEX: 26.98 KG/M2 | DIASTOLIC BLOOD PRESSURE: 82 MMHG

## 2017-12-13 DIAGNOSIS — S92.354D CLOSED NONDISPLACED FRACTURE OF FIFTH METATARSAL BONE OF RIGHT FOOT WITH ROUTINE HEALING: Primary | ICD-10-CM

## 2017-12-13 DIAGNOSIS — S92.354D CLOSED NONDISPLACED FRACTURE OF FIFTH METATARSAL BONE OF RIGHT FOOT WITH ROUTINE HEALING: ICD-10-CM

## 2017-12-13 PROCEDURE — 99999 PR PBB SHADOW E&M-EST. PATIENT-LVL III: CPT | Mod: PBBFAC,,, | Performed by: PODIATRIST

## 2017-12-13 PROCEDURE — 73630 X-RAY EXAM OF FOOT: CPT | Mod: TC,PO,RT

## 2017-12-13 PROCEDURE — 73630 X-RAY EXAM OF FOOT: CPT | Mod: 26,RT,, | Performed by: RADIOLOGY

## 2017-12-13 PROCEDURE — 99213 OFFICE O/P EST LOW 20 MIN: CPT | Mod: S$GLB,,, | Performed by: PODIATRIST

## 2017-12-13 NOTE — PATIENT INSTRUCTIONS
Toe and Metatarsal Fractures (Broken Toes)        The structure of the foot is complex, consisting of bones, muscles, tendons and other soft tissues. Of the 28 bones in the foot, 19 are toe bones (phalanges) and metatarsal bones (the long bones in the midfoot). Fractures of the toe and metatarsal bones are common and require evaluation by a specialist. A foot and ankle surgeon should be seen for proper diagnosis and treatment, even if initial treatment has been received in an emergency room.    What Is a Fracture?  A fracture is a break in the bone. Fractures can be divided into two categories: traumatic fractures and stress fractures.     Traumatic fractures (also called acute fractures) are caused by a direct blow or impact, such as seriously stubbing your toe. Traumatic fractures can be displaced or nondisplaced. If the fracture is displaced, the bone is broken in such a way that it has changed in position (malpositioned).    Signs and symptoms of a traumatic fracture include:    You may hear a sound at the time of the break.  Pinpoint pain (pain at the place of impact) at the time the fracture occurs and perhaps for a few hours later, but often the pain goes away after several hours.  Crooked or abnormal appearance of the toe.  Bruising and swelling the next day.     It is not true that if you can walk on it, its not broken. Evaluation by a foot and ankle surgeon is always recommended.    Stress fractures are tiny hairline breaks usually caused by repetitive stress. Stress fractures often afflict athletes who, for example, too rapidly increase their running mileage. They can also be caused by an abnormal foot structure, deformities or osteoporosis. Improper footwear may also lead to stress fractures. Stress fractures should not be ignored. They require proper medical attention to heal correctly.    Symptoms of stress fractures include:    Pain with or after normal activity  Pain that goes away when resting  and then returns when standing or during activity  Pinpoint pain (pain at the site of the fracture) when touched  Swelling but no bruising     Consequences of Improper Treatment  Some people say that the doctor cant do anything for a broken bone in the foot. This is usually not true. In fact, if a fractured toe or metatarsal bone is not treated correctly, serious complications may develop. For example:    A deformity in the bony architecture, which may limit the ability to move the foot or cause difficulty in fitting shoes.  Arthritis, which may be caused by a fracture in a joint (the juncture where two bones meet), or may be a result of angular deformities that develop when a displaced fracture is severe or has not been properly corrected.  Chronic pain and deformity.  Nonunion, or failure to heal, can lead to subsequent surgery or chronic pain.     Treatment of Toe Fractures  Fractures of the toe bones are almost always traumatic fractures. Treatment for traumatic fractures depends on the break itself and may include these options:    Rest. Sometimes rest is all that is needed to treat a traumatic fracture of the toe.  Splinting. The toe may be fitted with a splint to keep it in a fixed position.  Rigid or stiff-soled shoe. Wearing a stiff-soled shoe protects the toe and helps keep it properly positioned. Use of a postoperative shoe or bootwalker is also helpful.  David taping the fractured toe to another toe is sometimes appropriate, but in other cases, it may be harmful.  Surgery. If the break is badly displaced or if the joint is affected, surgery may be necessary. Surgery often involves the use of fixation devices, such as pins.     Treatment of Metatarsal Fractures  Breaks in the metatarsal bones may be either stress or traumatic fractures. Certain kinds of fractures of the metatarsal bones present unique challenges.    For example, sometimes a fracture of the first metatarsal bone (behind the big toe) can  lead to arthritis. Since the big toe is used so frequently and bears more weight than other toes, arthritis in that area can make it painful to walk, bend or even stand.    Another type of break, called a Mckeon fracture, occurs at the base of the fifth metatarsal bone (behind the little toe). It is often misdiagnosed as an ankle sprain, and misdiagnosis can have serious consequences since sprains and fractures require different treatments. Your foot and ankle surgeon is an expert in correctly identifying these conditions as well as other problems of the foot.    Treatment of metatarsal fractures depends on the type and extent of the fracture and may include:    Rest. Sometimes rest is the only treatment needed to promote healing of a stress or traumatic fracture of a metatarsal bone.  Avoid the offending activity. Because stress fractures result from repetitive stress, it is important to avoid the activity that led to the fracture. Crutches or a wheelchair are sometimes required to offload weight from the foot to give it time to heal.  Immobilization, casting or rigid shoe. A stiff-soled shoe or other form of immobilization may be used to protect the fractured bone while it is healing. Use of a postoperative shoe or bootwalker is also helpful.  Surgery. Some traumatic fractures of the metatarsal bones require surgery, especially if the break is badly displaced.  Follow-up care. Your foot and ankle surgeon will provide instructions for care following surgical or nonsurgical treatment. Physical therapy, exercises and rehabilitation may be included in a schedule for return to normal activities.          Bone Healing  How Does a Bone Heal?    All broken bones go through the same healing process. This is true whether a bone has been cut as part of a surgical procedure or fractured through an injury.     The bone healing process has three overlapping stages: inflammation, bone production and bone  remodeling.        Inflammation starts immediately after the bone is fractured and lasts for several days. When the bone is fractured, there is bleeding into the area, leading to inflammation and clotting of blood at the fracture site. This provides the initial structural stability and framework for producing new bone.        Bone production begins when the clotted blood formed by inflammation is replaced with fibrous tissue and cartilage (known as soft callus). As healing progresses, the soft callus is replaced with hard bone (known as hard callus), which is visible on x-rays several weeks after the fracture.        Bone remodeling, the final phase of bone healing, goes on for several months. In remodeling, bone continues to form and becomes compact, returning to its original shape. In addition, blood circulation in the area improves. Once adequate bone healing has occurred, weightbearing (such as standing or walking) encourages bone remodeling.?  How Long Does Bone Healing Take?   Bone generally takes 6 to 12 weeks to heal to a significant degree. In general, children's bones heal faster than those of adults. The foot and ankle surgeon will determine when the patient is ready to bear weight on the area. This will depend on the location and severity of the fracture, the type of surgical procedure performed and other considerations.    What Helps Promote Bone Healing?  If a bone will be cut during a planned surgical procedure, some steps can be taken pre- and postoperatively to help optimize healing. The surgeon may offer advice on diet and nutritional supplements that are essential to bone growth. Smoking cessation and adequate control of blood sugar levels in people living with diabetes are important. Smoking and high glucose levels interfere with bone healing.     For all patients with fractured bones, immobilization is a critical part of treatment because any movement of bone fragments slows down the initial  healing process. Depending on the type of fracture or surgical procedure, the surgeon may use some form of fixation (such as screws, plates or wires) on the fractured bone and/or a cast to keep the bone from moving. During the immobilization period, weightbearing is restricted as instructed by the surgeon.     Once the bone is adequately healed, physical therapy often plays a key role in rehabilitation. An exercise program designed for the patient can help in regaining strength and balance and can assist in returning to normal activities.    What Can Hinder Bone Healing?   A wide variety of factors can slow down the healing process. These include:    Movement of the bone fragments; weightbearing too soon  Smoking, which constricts the blood vessels and decreases circulation  Medical conditions, such as diabetes, hormone-related problems or vascular disease  Some medications, such as corticosteroids and other immunosuppressants  Fractures that are severe, complicated or become infected  Advanced age  Poor nutrition or impaired metabolism  Low levels of calcium and vitamin D     How Can Slow Healing Be Treated?   If the bone is not healing as well as expected or fails to heal, the foot and ankle surgeon can choose from a variety of treatment options to enhance bone growth, such as continued immobilization for a longer period, bone stimulation or surgery with bone grafting or use of bone growth proteins      Understanding Fifth Metatarsal Fracture    A fifth metatarsal fracture is a type of broken bone in your foot. You have 5 metatarsals. They are the middle bones in your feet, between your toes and your anklebones (tarsals). The fifth metatarsal connects your smallest toe to your ankle. These bones help with arch support and balance.     How to say it  met-ah-TAHR-sal   What causes a fifth metatarsal fracture?  A direct blow to the bone is often the cause of a fracture of the fifth metatarsal. That may happen if you  drop a heavy object on your foot or land wrong on your foot or ankle. Twisting activities can also break the bone. Pivoting while playing basketball is one example.  Repeatedly placing too much stress on the bone can also cause a fracture of the fifth metatarsal. This is called a stress fracture. People who do physical activities like dancing or running tend to be more prone to stress fractures.  Symptoms of a fifth metatarsal fracture  Sudden pain along the outside of your foot is the main symptom. A stress fracture may develop more slowly. You may feel chronic pain for a period of time. Your foot may also swell up and bruise. You may have trouble walking.  Treatment for a fifth metatarsal fracture  Treatment for this type of fracture depends on where the bone is broken and how severe the breakage is. Healing can take up to several months. Treatment may include:  · Cold therapy. Putting ice on the area may reduce swelling and pain, especially in the first few days after injury.  · Elevation. Propping up the foot so it is above the level of your heart may ease swelling.  · Prescription or over-the-counter pain medicines. These help reduce pain and swelling.  · Immobilization. Devices such as a splint, cast, or walking boot can protect the bone and ease pain. They can help keep the bone in place so it heals properly. You may need to avoid putting any weight on the broken bone for a period of time. Severe fractures usually need a longer limit on weight-bearing activities.  · Stretching and strengthening exercises. Certain exercises can help you regain flexibility and strength in your foot.  · Surgery. You usually will not need surgery. But you may need it if the bone is broken into 2 or more pieces and is not aligned (displaced), doesnt heal properly, or takes a long time to heal.  Possible complications of a fifth metatarsal fracture  · The bone doesnt heal correctly  · Acute compartment syndrome. This is when  pressure builds up in the muscles of the foot and affects blood flow.     When to call your healthcare provider  Call your healthcare provider right away if you have any of these:  · Fever of 100.4°F (38°C) or higher, or as directed  · Symptoms that dont get better, or get worse  · Numbness or coldness in your foot  · Toe nails that turn blue or grey in color  · New symptoms   Date Last Reviewed: 3/10/2016  © 5323-8837 [employer]. [address] [place] [address]. All rights reserved. This information is not intended as a substitute for professional medical care. Always follow your healthcare professional's instructions.

## 2017-12-13 NOTE — PROGRESS NOTES
Subjective:    Patient ID: Mariann Huff is a 57 y.o. female.    Chief Complaint: Nail Care (fungus f/u)      HPI:   Mariann is a 57 y.o. female who presents to the podiatry clinic  with complaint of  right foot pain. Onset of the symptoms was several weeks ago. Precipitating event: none known. Current symptoms include: ability to bear weight, but with some pain, swelling and worsening symptoms after a period of activity. Aggravating factors: any weight bearing. Symptoms have gradually worsened. Patient has had no prior foot problems. Evaluation to date: none. Treatment to date: none. Patients rates pain 7/10 on pain scale.    DOI: ~ 17:  56 yo F f/u for 5th MT fx.  She was given a CAM boot and told to be nwb - she did not follow instructinos and is not wearing boot today.         Last Podiatry Enc: Visit date not found  Last Enc w/ Me: Visit date not found    Past Medical History:   Diagnosis Date    Anticoagulant long-term use     Asthma     Back pain     CAD (coronary artery disease)     s/p stentimg  (2), (1)    Carotid artery stenosis     Diabetes mellitus type 2 in obese     HTN (hypertension), benign     Hyperlipidemia     Keloid cicatrix     NPDR (nonproliferative diabetic retinopathy) 2015    NSTEMI (non-ST elevated myocardial infarction)     Nuclear sclerosis - Right Eye 3/18/2014    Primary localized osteoarthrosis, lower leg 2014    Sleep apnea      Past Surgical History:   Procedure Laterality Date    CARDIAC CATHETERIZATION      cataract extraction left eye      cataracts       SECTION, LOW TRANSVERSE      CORONARY ANGIOPLASTY      EXCISION TURBINATE, SUBMUCOUS      HAND SURGERY Left     HAND SURGERY Right     torn ligament repair/ Dr. Yeboah    HYSTERECTOMY      left foot surgery      left wrist surgery      NASAL SEPTUM SURGERY  5/7/15    rt elbow surgery      S/P LAD COATED STENT  2010    6 total     S/P OM1 STENT   "08/2003    SINUS SURGERY      F.E.S.S.    TUBAL LIGATION       Social History     Social History    Marital status:      Spouse name: Shamir    Number of children: 2    Years of education: N/A     Occupational History    cafeteria [employer]     Social History Main Topics    Smoking status: Never Smoker    Smokeless tobacco: Never Used    Alcohol use No    Drug use: No    Sexual activity: Yes     Partners: Male     Birth control/ protection: Post-menopausal      Comment:      Other Topics Concern    Are You Pregnant Or Think You May Be? No    Breast-Feeding No     Social History Narrative    . 2 children.          Current Outpatient Prescriptions:     ACCU-CHEK EDIN PLUS METER Misc, , Disp: , Rfl:     albuterol 90 mcg/actuation inhaler, Inhale 1-2 puffs into the lungs every 6 (six) hours as needed., Disp: 1 each, Rfl: 0    amlodipine (NORVASC) 10 MG tablet, TAKE 1 TABLET (10 MG TOTAL) BY MOUTH ONCE DAILY., Disp: 30 tablet, Rfl: 11    aspirin 81 mg Tab, Take 81 mg by mouth. 1 Tablet Oral Every day, Disp: , Rfl:     atorvastatin (LIPITOR) 40 MG tablet, TAKE 1 TABLET (40 MG TOTAL) BY MOUTH ONCE DAILY., Disp: 30 tablet, Rfl: 11    BD INSULIN PEN NEEDLE UF MINI 31 x 3/16 " Ndle, USE 4 TIMES A DAY, Disp: 200 each, Rfl: 11    blood sugar diagnostic (ACCU-CHEK EDIN PLUS TEST STRP) Strp, TEST BLOOD SUGARS 4 TIMES DAILY, Disp: 150 strip, Rfl: 11    chlorthalidone (HYGROTEN) 25 MG Tab, Take 1 tablet (25 mg total) by mouth once daily., Disp: 30 tablet, Rfl: 11    cyclobenzaprine (FLEXERIL) 10 MG tablet, TAKE 1 TABLET BY MOUTH 3 TIMES A DAY AS NEEDED FOR MUSCLE SPASMS. NO WORKING OR DRIVING ON THIS MED, Disp: 45 tablet, Rfl: 5    esomeprazole (NEXIUM) 40 MG capsule, Take 1 capsule (40 mg total) by mouth before breakfast., Disp: 90 capsule, Rfl: 3    fenofibrate micronized (LOFIBRA) 134 MG Cap, TAKE 1 CAPSULE " "(134 MG TOTAL) BY MOUTH BEFORE BREAKFAST., Disp: 30 capsule, Rfl: 10    hydrocodone-acetaminophen 7.5-325mg (NORCO) 7.5-325 mg per tablet, Take 1 tablet by mouth every 8 (eight) hours as needed for Pain., Disp: 30 tablet, Rfl: 0    insulin aspart (NOVOLOG) 100 unit/mL InPn pen, 35 U in am, 35 units noon, 35 units pm; 150-200 +2, 201-250 + 4, 251-300 + 6, 301-350 +8, >350 +10 and call MD, Disp: 20 mL, Rfl: 11    insulin glargine, TOUJEO, (TOUJEO SOLOSTAR) 300 unit/mL (1.5 mL) InPn pen, Inject 50 Units into the skin 2 (two) times daily., Disp: 6 Syringe, Rfl: 11    INVOKANA 300 mg Tab tablet, Take 1 tablet (300 mg total) by mouth once daily., Disp: 30 tablet, Rfl: 11    lancets 30 gauge Misc, , Disp: , Rfl:     losartan (COZAAR) 100 MG tablet, TAKE 1 TABLET (100 MG TOTAL) BY MOUTH ONCE DAILY., Disp: 30 tablet, Rfl: 11    metoprolol succinate (TOPROL-XL) 100 MG 24 hr tablet, TAKE 1 TABLET (100 MG TOTAL) BY MOUTH ONCE DAILY., Disp: 30 tablet, Rfl: 11    nitroGLYCERIN (NITROSTAT) 0.4 MG SL tablet, Take one every 2-3 min till chestpain relief for 3 times and if still no relief, call MD or come to ED, Disp: 30 tablet, Rfl: 11    nystatin (MYCOSTATIN) cream, APPLY TOPICALLY 3 (THREE) TIMES DAILY., Disp: 30 g, Rfl: 3    omega-3 acid ethyl esters (LOVAZA) 1 gram capsule, Take 1 g by mouth once daily. , Disp: , Rfl:     pen needle, diabetic (BD ULTRA-FINE GRABIEL PEN NEEDLES) 32 gauge x 5/32" Ndle, For use with insulin pens daily 4 times a day, Disp: 100 each, Rfl: 11    prasugrel (EFFIENT) 10 mg Tab, TAKE 1 TABLET (10 MG TOTAL) BY MOUTH ONCE DAILY., Disp: 30 tablet, Rfl: 10    SURE COMFORT PEN NEEDLE 31 gauge x 5/16" Ndle, , Disp: , Rfl:     tramadol (ULTRAM) 50 mg tablet, Take 1 tablet (50 mg total) by mouth 3 (three) times daily as needed for Pain., Disp: 60 tablet, Rfl: 1    zolpidem (AMBIEN) 5 MG Tab, TAKE 1 TABLET (5 MG TOTAL) BY MOUTH NIGHTLY AS NEEDED., Disp: 30 tablet, Rfl: 2  Review of patient's allergies " "indicates:  No Known Allergies    ROS  ROS Constitutional:  General Appearance: well nourished  Vascular: negative for cramps, edema and bruising  Musculoskeletal: negative for joint paint and joint edema  Skin: negative for rashes and lesions  Neurological: negative for burning, tingling and numbness  Gastrointestinal: negative for stomach pain, nausea and vomiting        Objective:        BP (!) 149/82   Pulse 97   Ht 5' 4" (1.626 m)   Wt 71.7 kg (158 lb)   BMI 27.12 kg/m²     Ortho/SPM Exam  Physical Exam  LE exam con't:  V: DP 2/4, PT 2/4, CRT< 3s to all digits tested.     N: SILT in SP/DP/T/Maxi/Saph distributions.    Derm: Skin intact. No erythema, edema or ecchymosis.     Ortho:  +Motor EHL/FHL/TA/GA   +TTP 5th metatarsal R foot   Compartments soft/compressible. No pain on passive stretch of big toe. No calf  pain.        Assessment:     Imaging / Labs:    X-ray Foot Complete Right    Result Date: 5/22/2017  There appears to be an incomplete fracture through the mid fifth metatarsal in area of the patient's pain.  Mild degenerative changes are seen to first MP joint.  Some degenerative changes seen at the bases of the first metatarsal and calcaneal spurs are noted.     Fracture through the mid fifth metatarsal Electronically signed by: Florin Cooley MD Date:     05/22/17 Time:    13:40     Narrative     Time of Procedure: 07/07/17 08:18:33  Accession # [id number]    Comparison: 5/22/2017, 1333 hrs.    Number of views: 3.    Findings:  There is a healing transverse fracture of the mid diaphysis of the fifth metatarsal with fracture fragments in satisfactory position and alignment.  Fracture line remains conspicuous.    No new abnormality identified in this patient with mild degenerative change at the first MTP and spurring along the dorsal aspects of the tarsal bones.  Inferior calcaneal spur noted.   Impression       Please see above.       Electronically signed by: Fay Loyola MD  Date: " 07/07/17  Time: 08:40              1. Closed nondisplaced fracture of fifth metatarsal bone of right foot with routine healing          Plan:       Orders Placed This Encounter    X-Ray Foot Complete Right    X-Ray Foot Complete Right     Procedures  .   Mariann was seen today for nail care.    Diagnoses and all orders for this visit:    Closed nondisplaced fracture of fifth metatarsal bone of right foot with routine healing  -     X-Ray Foot Complete Right; Future  -     X-Ray Foot Complete Right; Future        Mariann Huff is a 57 y.o. female presenting w/   1. Closed nondisplaced fracture of fifth metatarsal bone of right foot with routine healing      DOI: ~ 6/2/17    -pt seen, evaluated, and managed  -dx discussed in detail. All questions/concerns addressed  -all tx options discussed. All alternatives, risks, benefits of all txs discussed  -The patient was educated regarding the above diagnosis. We discussed conservative care options regarding shoe wear and/or padding.  -rxs dispensed: none  -xr reviewed: callus forming. Bone healing, not healed  -new XR today  -PWBAT in CAM RLE  -told pt that due to her noncomlpiance she may be developing a delayed/non union.   -urged compliance  -CT at nxt visit if XR showed improper healing signs she still has pain    Return in about 5 weeks (around 1/17/2018).

## 2017-12-18 ENCOUNTER — OFFICE VISIT (OUTPATIENT)
Dept: INTERNAL MEDICINE | Facility: CLINIC | Age: 57
End: 2017-12-18
Payer: MEDICARE

## 2017-12-18 VITALS
BODY MASS INDEX: 27.33 KG/M2 | TEMPERATURE: 99 F | WEIGHT: 160.06 LBS | RESPIRATION RATE: 14 BRPM | DIASTOLIC BLOOD PRESSURE: 63 MMHG | HEART RATE: 71 BPM | SYSTOLIC BLOOD PRESSURE: 123 MMHG | HEIGHT: 64 IN

## 2017-12-18 DIAGNOSIS — B96.89 SKIN INFECTION, BACTERIAL: ICD-10-CM

## 2017-12-18 DIAGNOSIS — N63.20 LEFT BREAST MASS: Primary | ICD-10-CM

## 2017-12-18 DIAGNOSIS — N64.51 INDURATION OF BREAST: ICD-10-CM

## 2017-12-18 DIAGNOSIS — L08.9 SKIN INFECTION, BACTERIAL: ICD-10-CM

## 2017-12-18 PROCEDURE — 99214 OFFICE O/P EST MOD 30 MIN: CPT | Mod: S$GLB,,, | Performed by: INTERNAL MEDICINE

## 2017-12-18 PROCEDURE — 99999 PR PBB SHADOW E&M-EST. PATIENT-LVL IV: CPT | Mod: PBBFAC,,, | Performed by: INTERNAL MEDICINE

## 2017-12-18 RX ORDER — CLINDAMYCIN HYDROCHLORIDE 300 MG/1
300 CAPSULE ORAL EVERY 8 HOURS
Qty: 21 CAPSULE | Refills: 0 | Status: SHIPPED | OUTPATIENT
Start: 2017-12-18 | End: 2017-12-25

## 2017-12-18 RX ORDER — FENOFIBRATE 134 MG/1
CAPSULE ORAL
Qty: 30 CAPSULE | Refills: 6 | Status: SHIPPED | OUTPATIENT
Start: 2017-12-18 | End: 2018-09-14 | Stop reason: SDUPTHER

## 2017-12-18 NOTE — PROGRESS NOTES
Subjective:       Patient ID: Mariann Huff is a 57 y.o. female who presents for Breast Mass      HPI     Breast Mass: Patient presents for evaluation of a breast mass. Change was noted 2 weeks ago. Patient does not routinely do self breast exams.  Patient has noted a change on breast exam.  Patient denies hormonal therapy. Patient denies nipple discharge. Patient denies to previous breast biopsy. Patient denies a personal history of breast cancer. Denies malaise, no fever or chills, no sweats.    Last MMG 9/20/17, normal    Review of Systems   Constitutional: Negative for chills, fatigue and fever.   HENT: Negative for congestion, rhinorrhea and sinus pressure.    Eyes: Negative for redness and visual disturbance.   Respiratory: Negative for cough and shortness of breath.    Cardiovascular: Negative for chest pain, palpitations and leg swelling.   Gastrointestinal: Negative for abdominal pain, diarrhea, nausea and vomiting.   Genitourinary:        Left breast mass   Musculoskeletal: Negative for arthralgias and myalgias.   Skin: Negative for rash.   Neurological: Negative for dizziness, weakness, light-headedness and headaches.   Hematological: Negative for adenopathy.       Objective:      Physical Exam   Constitutional: She is oriented to person, place, and time. Vital signs are normal. She appears well-developed and well-nourished. No distress.   HENT:   Head: Normocephalic and atraumatic.   Right Ear: Hearing and external ear normal.   Left Ear: Hearing and external ear normal.   Nose: Nose normal.   Mouth/Throat: Uvula is midline.   Eyes: Lids are normal.   Neck: Normal range of motion.   Cardiovascular: Normal rate, regular rhythm, normal heart sounds and intact distal pulses.    No murmur heard.  Pulmonary/Chest: Effort normal and breath sounds normal. No tachypnea and no bradypnea. She has no decreased breath sounds. She has no wheezes. She exhibits mass (breast tissue deep to lesion is very tender and  painful) and tenderness (left breast upper outer quadrant + tenderness to palpation, ~1cm area of induration on surface, minimal erythema, no warmth, no fluctuance or drainage).   Abdominal: Soft. Bowel sounds are normal. She exhibits no distension. There is no tenderness.   Musculoskeletal: Normal range of motion. She exhibits no edema.   Neurological: She is alert and oriented to person, place, and time.   Skin: Skin is warm, dry and intact. No rash noted. She is not diaphoretic.   Psychiatric: She has a normal mood and affect.   Vitals reviewed.      Assessment:       1. Left breast mass    2. Skin infection, bacterial    3. Induration of breast         Plan:       1. Left breast mass  - Mammo Digital Diagnostic Left with CAD; Future  - US Breast Left Complete; Future    2. Skin infection, bacterial  - clindamycin (CLEOCIN) 300 MG capsule; Take 1 capsule (300 mg total) by mouth every 8 (eight) hours.  Dispense: 21 capsule; Refill: 0    3. Induration of breast   - Mammo Digital Diagnostic Left with CAD; Future    Saba Nash MD

## 2017-12-18 NOTE — PATIENT INSTRUCTIONS
· To help relieve pain and swelling, heat or ice may be used. Apply as often as directed by your provider.  ? Heat: Place a warm compress on the breast. Use a towel soaked in hot water, a heating pad, or a hot water bottle.  ? Cold: Place a cold compress on the breast. Use an ice pack or bag of ice wrapped in a thin towel. Never place a cold source directly on the skin.

## 2017-12-19 ENCOUNTER — HOSPITAL ENCOUNTER (OUTPATIENT)
Dept: RADIOLOGY | Facility: HOSPITAL | Age: 57
Discharge: HOME | End: 2017-12-19
Attending: INTERNAL MEDICINE
Payer: MEDICARE

## 2017-12-19 DIAGNOSIS — N64.51 INDURATION OF BREAST: ICD-10-CM

## 2017-12-19 DIAGNOSIS — N63.20 LEFT BREAST MASS: ICD-10-CM

## 2017-12-19 PROCEDURE — 77061 BREAST TOMOSYNTHESIS UNI: CPT | Mod: 26,LT,, | Performed by: STUDENT IN AN ORGANIZED HEALTH CARE EDUCATION/TRAINING PROGRAM

## 2017-12-19 PROCEDURE — 77061 BREAST TOMOSYNTHESIS UNI: CPT | Mod: TC,PO,LT

## 2017-12-19 PROCEDURE — 77065 DX MAMMO INCL CAD UNI: CPT | Mod: 26,LT,, | Performed by: STUDENT IN AN ORGANIZED HEALTH CARE EDUCATION/TRAINING PROGRAM

## 2017-12-19 PROCEDURE — 76642 ULTRASOUND BREAST LIMITED: CPT | Mod: 26,LT,, | Performed by: STUDENT IN AN ORGANIZED HEALTH CARE EDUCATION/TRAINING PROGRAM

## 2017-12-19 PROCEDURE — 76642 ULTRASOUND BREAST LIMITED: CPT | Mod: TC,PO,LT

## 2017-12-27 RX ORDER — METOPROLOL SUCCINATE 100 MG/1
TABLET, EXTENDED RELEASE ORAL
Qty: 30 TABLET | Refills: 0 | Status: SHIPPED | OUTPATIENT
Start: 2017-12-27 | End: 2018-02-19 | Stop reason: SDUPTHER

## 2017-12-29 ENCOUNTER — LAB VISIT (OUTPATIENT)
Dept: LAB | Facility: HOSPITAL | Age: 57
End: 2017-12-29
Attending: INTERNAL MEDICINE
Payer: MEDICARE

## 2017-12-29 DIAGNOSIS — Z79.4 TYPE 2 DIABETES MELLITUS WITH DIABETIC PERIPHERAL ANGIOPATHY WITHOUT GANGRENE, WITH LONG-TERM CURRENT USE OF INSULIN: Chronic | ICD-10-CM

## 2017-12-29 DIAGNOSIS — E11.51 TYPE 2 DIABETES MELLITUS WITH DIABETIC PERIPHERAL ANGIOPATHY WITHOUT GANGRENE, WITH LONG-TERM CURRENT USE OF INSULIN: Chronic | ICD-10-CM

## 2017-12-29 LAB
ESTIMATED AVG GLUCOSE: 295 MG/DL
HBA1C MFR BLD HPLC: 11.9 %

## 2017-12-29 PROCEDURE — 83036 HEMOGLOBIN GLYCOSYLATED A1C: CPT

## 2017-12-29 PROCEDURE — 36415 COLL VENOUS BLD VENIPUNCTURE: CPT | Mod: PO

## 2018-01-04 ENCOUNTER — HOSPITAL ENCOUNTER (OUTPATIENT)
Dept: RADIOLOGY | Facility: HOSPITAL | Age: 58
Discharge: HOME | End: 2018-01-04
Attending: PODIATRIST
Payer: MEDICARE

## 2018-01-04 ENCOUNTER — OFFICE VISIT (OUTPATIENT)
Dept: PODIATRY | Facility: CLINIC | Age: 58
End: 2018-01-04
Payer: MEDICARE

## 2018-01-04 DIAGNOSIS — S92.354G CLOSED NONDISPLACED FRACTURE OF FIFTH METATARSAL BONE OF RIGHT FOOT WITH DELAYED HEALING, SUBSEQUENT ENCOUNTER: Primary | ICD-10-CM

## 2018-01-04 DIAGNOSIS — E11.42 DIABETIC PERIPHERAL NEUROPATHY ASSOCIATED WITH TYPE 2 DIABETES MELLITUS: ICD-10-CM

## 2018-01-04 DIAGNOSIS — S92.354D CLOSED NONDISPLACED FRACTURE OF FIFTH METATARSAL BONE OF RIGHT FOOT WITH ROUTINE HEALING: ICD-10-CM

## 2018-01-04 PROCEDURE — 99213 OFFICE O/P EST LOW 20 MIN: CPT | Mod: S$GLB,,, | Performed by: PODIATRIST

## 2018-01-04 PROCEDURE — 73630 X-RAY EXAM OF FOOT: CPT | Mod: 26,RT,, | Performed by: RADIOLOGY

## 2018-01-04 PROCEDURE — 99999 PR PBB SHADOW E&M-EST. PATIENT-LVL I: CPT | Mod: PBBFAC,,, | Performed by: PODIATRIST

## 2018-01-04 PROCEDURE — 73630 X-RAY EXAM OF FOOT: CPT | Mod: TC,PO,RT

## 2018-01-04 NOTE — PROGRESS NOTES
Subjective:    Patient ID: Mariann Huff is a 57 y.o. female.    Chief Complaint: Toe Pain (left foot / dr daniels / 17)      HPI:   Mariann is a 57 y.o. female who presents to the podiatry clinic  with complaint of  right foot pain. Onset of the symptoms was several weeks ago. Precipitating event: none known. Current symptoms include: ability to bear weight, but with some pain, swelling and worsening symptoms after a period of activity. Aggravating factors: any weight bearing. Symptoms have gradually worsened. Patient has had no prior foot problems. Evaluation to date: none. Treatment to date: none. Patients rates pain 7/10 on pain scale.    DOI: ~ 17:  58 yo F f/u for 5th MT fx.  She was given a CAM boot and told to be nwb - she did not follow instructinos and is not wearing boot today.         18:  follow up right 5th metatarsal fracture since about .  She still has pain in the area.   She is wearing flat slip on shoes today.   Xrays done            Past Medical History:   Diagnosis Date    Anticoagulant long-term use     Asthma     Back pain     CAD (coronary artery disease)     s/p stentimg  (2), (1)    Carotid artery stenosis     Diabetes mellitus type 2 in obese     HTN (hypertension), benign     Hyperlipidemia     Keloid cicatrix     NPDR (nonproliferative diabetic retinopathy) 2015    NSTEMI (non-ST elevated myocardial infarction)     Nuclear sclerosis - Right Eye 3/18/2014    Primary localized osteoarthrosis, lower leg 2014    Sleep apnea      Past Surgical History:   Procedure Laterality Date    CARDIAC CATHETERIZATION      cataract extraction left eye      cataracts       SECTION, LOW TRANSVERSE      CORONARY ANGIOPLASTY      EXCISION TURBINATE, SUBMUCOUS      HAND SURGERY Left     HAND SURGERY Right     torn ligament repair/ Dr. Yeboah    HYSTERECTOMY      left foot surgery      left wrist surgery      NASAL SEPTUM  "SURGERY  5/7/15    rt elbow surgery      S/P LAD COATED STENT  05/14/2010    6 total     S/P OM1 STENT  08/2003    SINUS SURGERY      F.E.S.S.    TUBAL LIGATION       Social History     Social History    Marital status:      Spouse name: Shamir    Number of children: 2    Years of education: N/A     Occupational History    cafeteria [employer]     Social History Main Topics    Smoking status: Never Smoker    Smokeless tobacco: Never Used    Alcohol use No    Drug use: No    Sexual activity: Yes     Partners: Male     Birth control/ protection: Post-menopausal      Comment:      Other Topics Concern    Are You Pregnant Or Think You May Be? No    Breast-Feeding No     Social History Narrative    . 2 children.          Current Outpatient Prescriptions:     ACCU-CHEK EDIN PLUS METER Misc, , Disp: , Rfl:     albuterol 90 mcg/actuation inhaler, Inhale 1-2 puffs into the lungs every 6 (six) hours as needed., Disp: 1 each, Rfl: 0    amlodipine (NORVASC) 10 MG tablet, TAKE 1 TABLET (10 MG TOTAL) BY MOUTH ONCE DAILY., Disp: 30 tablet, Rfl: 11    aspirin 81 mg Tab, Take 81 mg by mouth. 1 Tablet Oral Every day, Disp: , Rfl:     atorvastatin (LIPITOR) 40 MG tablet, TAKE 1 TABLET (40 MG TOTAL) BY MOUTH ONCE DAILY., Disp: 30 tablet, Rfl: 11    BD INSULIN PEN NEEDLE UF MINI 31 x 3/16 " Ndle, USE 4 TIMES A DAY, Disp: 200 each, Rfl: 11    blood sugar diagnostic (ACCU-CHEK EDIN PLUS TEST STRP) Strp, TEST BLOOD SUGARS 4 TIMES DAILY, Disp: 150 strip, Rfl: 11    chlorthalidone (HYGROTEN) 25 MG Tab, Take 1 tablet (25 mg total) by mouth once daily., Disp: 30 tablet, Rfl: 11    cyclobenzaprine (FLEXERIL) 10 MG tablet, TAKE 1 TABLET BY MOUTH 3 TIMES A DAY AS NEEDED FOR MUSCLE SPASMS. NO WORKING OR DRIVING ON THIS MED, Disp: 45 tablet, Rfl: 5    esomeprazole (NEXIUM) 40 MG capsule, Take 1 capsule (40 mg total) by mouth " "before breakfast., Disp: 90 capsule, Rfl: 3    fenofibrate micronized (LOFIBRA) 134 MG Cap, TAKE 1 CAPSULE (134 MG TOTAL) BY MOUTH BEFORE BREAKFAST., Disp: 30 capsule, Rfl: 6    hydrocodone-acetaminophen 7.5-325mg (NORCO) 7.5-325 mg per tablet, Take 1 tablet by mouth every 8 (eight) hours as needed for Pain., Disp: 30 tablet, Rfl: 0    insulin aspart (NOVOLOG) 100 unit/mL InPn pen, 35 U in am, 35 units noon, 35 units pm; 150-200 +2, 201-250 + 4, 251-300 + 6, 301-350 +8, >350 +10 and call MD, Disp: 20 mL, Rfl: 11    insulin glargine, TOUJEO, (TOUJEO SOLOSTAR) 300 unit/mL (1.5 mL) InPn pen, Inject 50 Units into the skin 2 (two) times daily., Disp: 6 Syringe, Rfl: 11    INVOKANA 300 mg Tab tablet, Take 1 tablet (300 mg total) by mouth once daily., Disp: 30 tablet, Rfl: 11    lancets 30 gauge Misc, , Disp: , Rfl:     losartan (COZAAR) 100 MG tablet, TAKE 1 TABLET (100 MG TOTAL) BY MOUTH ONCE DAILY., Disp: 30 tablet, Rfl: 11    metoprolol succinate (TOPROL-XL) 100 MG 24 hr tablet, TAKE 1 TABLET (100 MG TOTAL) BY MOUTH ONCE DAILY., Disp: 30 tablet, Rfl: 0    nitroGLYCERIN (NITROSTAT) 0.4 MG SL tablet, Take one every 2-3 min till chestpain relief for 3 times and if still no relief, call MD or come to ED, Disp: 30 tablet, Rfl: 11    nystatin (MYCOSTATIN) cream, APPLY TOPICALLY 3 (THREE) TIMES DAILY., Disp: 30 g, Rfl: 3    omega-3 acid ethyl esters (LOVAZA) 1 gram capsule, Take 1 g by mouth once daily. , Disp: , Rfl:     pen needle, diabetic (BD ULTRA-FINE GRABIEL PEN NEEDLES) 32 gauge x 5/32" Ndle, For use with insulin pens daily 4 times a day, Disp: 100 each, Rfl: 11    prasugrel (EFFIENT) 10 mg Tab, TAKE 1 TABLET (10 MG TOTAL) BY MOUTH ONCE DAILY., Disp: 30 tablet, Rfl: 10    SURE COMFORT PEN NEEDLE 31 gauge x 5/16" Ndvini, , Disp: , Rfl:     tramadol (ULTRAM) 50 mg tablet, Take 1 tablet (50 mg total) by mouth 3 (three) times daily as needed for Pain., Disp: 60 tablet, Rfl: 1    zolpidem (AMBIEN) 5 MG Tab, TAKE " 1 TABLET (5 MG TOTAL) BY MOUTH NIGHTLY AS NEEDED., Disp: 30 tablet, Rfl: 2  Review of patient's allergies indicates:  No Known Allergies    ROS  ROS Constitutional:  General Appearance: well nourished  Vascular: negative for cramps, edema and bruising  Musculoskeletal: negative for joint paint and joint edema  Skin: negative for rashes and lesions  Neurological: negative for burning, tingling and numbness  Gastrointestinal: negative for stomach pain, nausea and vomiting        Objective:        There were no vitals taken for this visit.    Ortho/SPM Exam  Physical Exam  LE exam con't:  V: DP 2/4, PT 2/4, CRT< 3s to all digits tested.     N: SILT in SP/DP/T/Maxi/Saph distributions.    Derm: Skin intact. No erythema, edema or ecchymosis.     Ortho:  +Motor EHL/FHL/TA/GA   +TTP 5th metatarsal R foot   Compartments soft/compressible. No pain on passive stretch of big toe. No calf  pain.        Assessment:     Imaging / Labs:    X-ray Foot Complete Right    Result Date: 5/22/2017  There appears to be an incomplete fracture through the mid fifth metatarsal in area of the patient's pain.  Mild degenerative changes are seen to first MP joint.  Some degenerative changes seen at the bases of the first metatarsal and calcaneal spurs are noted.     Fracture through the mid fifth metatarsal Electronically signed by: Florin Cooley MD Date:     05/22/17 Time:    13:40     Narrative     Time of Procedure: 07/07/17 08:18:33  Accession # [id number]    Comparison: 5/22/2017, 1333 hrs.    Number of views: 3.    Findings:  There is a healing transverse fracture of the mid diaphysis of the fifth metatarsal with fracture fragments in satisfactory position and alignment.  Fracture line remains conspicuous.    No new abnormality identified in this patient with mild degenerative change at the first MTP and spurring along the dorsal aspects of the tarsal bones.  Inferior calcaneal spur noted.   Impression       Please see above.        Electronically signed by: Fay Loyola MD  Date: 07/07/17  Time: 08:40        Xrays  1/4/18 Findings:  There is a healing fracture of the mid diaphysis of the fifth metatarsal with mature callus formation.  Fragments remain in satisfactory position and alignment.      There is mild spurring at the head of the first metatarsal with hallux rigidus and spurring along the dorsal aspects of the TMT's plus spur is at the inferior posterior aspects of the calcaneus.  Hammertoe deformity affects the second and third toes and possibly the fourth and fifth toes.    I detect no new injury and no lytic or blastic lesion.    There is calcific atherosclerosis, more than typical for a patient of this age.  Please correlate for possible predisposing metabolic factors.            1. Closed nondisplaced fracture of fifth metatarsal bone of right foot with delayed healing, subsequent encounter    2. Diabetic peripheral neuropathy associated with type 2 diabetes mellitus          Plan:          Procedures  .   Mariann was seen today for toe pain.    Diagnoses and all orders for this visit:    Closed nondisplaced fracture of fifth metatarsal bone of right foot with delayed healing, subsequent encounter    Diabetic peripheral neuropathy associated with type 2 diabetes mellitus        Mariann Huff is a 57 y.o. female presenting w/   1. Closed nondisplaced fracture of fifth metatarsal bone of right foot with delayed healing, subsequent encounter    2. Diabetic peripheral neuropathy associated with type 2 diabetes mellitus      DOI: ~ 6/2/17    -pt seen, evaluated, and managed  -dx discussed in detail. All questions/concerns addressed  -all tx options discussed. All alternatives, risks, benefits of all txs discussed  -rxs dispensed: none  -xr today reviewed: callus forming. Bone healing, not healed - she still has pain.   -return to CAM boot  -urged compliance  -will consider bone stimulator.       follow up in a month or  sooner if concerned.

## 2018-01-05 ENCOUNTER — OFFICE VISIT (OUTPATIENT)
Dept: ENDOCRINOLOGY | Facility: CLINIC | Age: 58
End: 2018-01-05
Payer: MEDICARE

## 2018-01-05 VITALS
HEART RATE: 72 BPM | SYSTOLIC BLOOD PRESSURE: 134 MMHG | HEIGHT: 64 IN | WEIGHT: 160.13 LBS | BODY MASS INDEX: 27.34 KG/M2 | DIASTOLIC BLOOD PRESSURE: 80 MMHG

## 2018-01-05 DIAGNOSIS — E11.59 HYPERTENSION ASSOCIATED WITH DIABETES: Chronic | ICD-10-CM

## 2018-01-05 DIAGNOSIS — E11.51 TYPE 2 DIABETES MELLITUS WITH DIABETIC PERIPHERAL ANGIOPATHY WITHOUT GANGRENE, WITH LONG-TERM CURRENT USE OF INSULIN: Primary | Chronic | ICD-10-CM

## 2018-01-05 DIAGNOSIS — I25.83 CORONARY ARTERY DISEASE DUE TO LIPID RICH PLAQUE: Chronic | ICD-10-CM

## 2018-01-05 DIAGNOSIS — M51.26 LUMBAR HERNIATED DISC: Chronic | ICD-10-CM

## 2018-01-05 DIAGNOSIS — G47.30 SLEEP APNEA, UNSPECIFIED TYPE: Chronic | ICD-10-CM

## 2018-01-05 DIAGNOSIS — S92.354D CLOSED NONDISPLACED FRACTURE OF FIFTH METATARSAL BONE OF RIGHT FOOT WITH ROUTINE HEALING: ICD-10-CM

## 2018-01-05 DIAGNOSIS — E11.42 DIABETIC PERIPHERAL NEUROPATHY ASSOCIATED WITH TYPE 2 DIABETES MELLITUS: ICD-10-CM

## 2018-01-05 DIAGNOSIS — I15.2 HYPERTENSION ASSOCIATED WITH DIABETES: Chronic | ICD-10-CM

## 2018-01-05 DIAGNOSIS — Z79.4 TYPE 2 DIABETES MELLITUS WITH DIABETIC PERIPHERAL ANGIOPATHY WITHOUT GANGRENE, WITH LONG-TERM CURRENT USE OF INSULIN: Primary | Chronic | ICD-10-CM

## 2018-01-05 DIAGNOSIS — E78.2 MIXED HYPERLIPIDEMIA: Chronic | ICD-10-CM

## 2018-01-05 DIAGNOSIS — Z95.5 S/P CORONARY ARTERY STENT PLACEMENT: ICD-10-CM

## 2018-01-05 DIAGNOSIS — K76.0 FATTY LIVER DISEASE, NONALCOHOLIC: ICD-10-CM

## 2018-01-05 DIAGNOSIS — I25.10 CORONARY ARTERY DISEASE DUE TO LIPID RICH PLAQUE: Chronic | ICD-10-CM

## 2018-01-05 PROCEDURE — 99214 OFFICE O/P EST MOD 30 MIN: CPT | Mod: S$GLB,,, | Performed by: NURSE PRACTITIONER

## 2018-01-05 PROCEDURE — 99999 PR PBB SHADOW E&M-EST. PATIENT-LVL V: CPT | Mod: PBBFAC,,, | Performed by: NURSE PRACTITIONER
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
## 2018-01-25 ENCOUNTER — OFFICE VISIT (OUTPATIENT)
Dept: SURGERY | Facility: CLINIC | Age: 58
End: 2018-01-25
Payer: MEDICARE

## 2018-01-25 VITALS
HEIGHT: 64 IN | SYSTOLIC BLOOD PRESSURE: 179 MMHG | WEIGHT: 158.38 LBS | HEART RATE: 74 BPM | TEMPERATURE: 98 F | BODY MASS INDEX: 27.04 KG/M2 | DIASTOLIC BLOOD PRESSURE: 82 MMHG

## 2018-01-25 DIAGNOSIS — N60.82 SEBACEOUS CYST OF SKIN OF LEFT BREAST: Primary | ICD-10-CM

## 2018-01-25 PROCEDURE — 99202 OFFICE O/P NEW SF 15 MIN: CPT | Mod: S$GLB,,, | Performed by: SURGERY

## 2018-01-25 PROCEDURE — 99999 PR PBB SHADOW E&M-EST. PATIENT-LVL III: CPT | Mod: PBBFAC,,, | Performed by: SURGERY

## 2018-01-25 NOTE — LETTER
January 25, 2018      Jasbir Haney MD  [address] [place] [address]           [facility]  [address]  [facility] [address]  Phone: [phone]  Fax: [phone]          Patient: Mariann Huff   MR Number: [id number]   YOB: 1960   Date of Visit: 1/25/2018       Dear Dr. Jasbir Haney:    Thank you for referring Mariann Huff to me for evaluation. Attached you will find relevant portions of my assessment and plan of care.    If you have questions, please do not hesitate to call me. I look forward to following Mariann Huff along with you.    Sincerely,    Dane Butler MD    Enclosure  CC:  No Recipients    If you would like to receive this communication electronically, please contact [email or web address] or [phone] to request more information on [employer] access.    For providers and/or their staff who would like to refer a patient to [facility], please contact us through our one-stop-shop provider referral line, [facility], at [phone].    If you feel you have received this communication in error or would no longer like to receive these types of communications, please e-mail [email or web address]

## 2018-01-25 NOTE — PROGRESS NOTES
"Subjective:      Mariann Huff presents for the evaluation of a breast mass. Onset of the symptoms was 1 months ago. Patient sought evaluation because of a breast lump on self examination that slowing enlarged in the upper out quadrant. She got a diagbnostic mammogram and ultrasound of the left breast at that time which was read as BIRADS 3 consistent with compatible with a sebaceous cyst.   The area is painful, warm, swollen. She has tried aleve for the pain with improvement in her symptoms. She has not tried warm compresses.     Has never had any abnormal mammograms in the past -last one was BIRADS 1 in 8/2017. Family history only significant for one paternal cousin with breast cancer.     Review of Systems  Constitutional: negative  Respiratory: negative  Cardiovascular: negative  Gastrointestinal: negative  Genitourinary:negative  Integument/breast: positive for breast lump and breast tenderness, negative for dryness, nipple discharge, pruritus, rash and skin color change      Objective:      BP (!) 179/82 (BP Location: Right arm, Patient Position: Sitting, BP Method: Medium (Automatic))   Pulse 74   Temp 97.9 °F (36.6 °C) (Oral)   Ht 5' 4" (1.626 m)   Wt 71.8 kg (158 lb 6.4 oz)   BMI 27.19 kg/m²   General appearance: alert, appears stated age and cooperative  Neck: no adenopathy and supple, symmetrical, trachea midline  Breasts: No nipple retraction or dimpling, No nipple discharge or bleeding, No axillary or supraclavicular adenopathy, Left breast exam revels a sebaceous cyst in the UOQ of the left breast that is TTP with minimal clear drainage from black puntum on skin. no warmth, erythema, induation, fluctuance.       12/19/2017 Diagnostic Left Mammogram and US  History:  Patient is 57 y.o. and is seen for a diagnostic mammogram. Area of concern in left breast (painful lump).  Films Compared:  Compared to: 09/20/2017 Mammo Digital Screening Bilat with Tomosynthesis_CAD, 03/03/2015 Mammo Digital " Screening Bilat with CAD, and 02/07/2014 Mammo Digital Screening Bilat with CAD  Findings:  This procedure was performed using tomosynthesis.  Computer-aided detection was utilized in the interpretation of this examination.  Mammo Digital Diagnostic Left with Tomosynthesis_CAD  The breast has scattered areas of fibroglandular density.  Double metallic BB skin markers are present on the left breast upper outer quadrant. Subjacent to the markers, there is focal skin thickening seen in the left breast at 1 o'clock in the posterior depth. The skin lesion correlates with a palpable mass, skin changes and pain reported by the patient.   No other suspicious finding in the left breast.  US Breast Left Limited  Targeted sonography at the area of concern in the left breast was performed, revealing focal skin thickening and edema with an irregularly shaped, non-parallel, hypoechoic mass with indistinct margins seen in the left breast at 1 o'clock, 11 cm from the nipple. The mass appears to be entirely within the dermis, and there is a hypoechoic tract extending from the mass to the skin. There is hypervascularity of the tissues surrounding the mass.  The mass correlates with a palpable mass, tenderness, skin changes and pain reported by the patient. There is a black punctum seen on the skin overlying the mass. These findings correlate with the mammographic finding.  No suspicious left axillary adenopathy.  Impression:  Left breast 1:00 axis 11 cm from nipple intradermal mass is compatible with a sebaceous cyst, acutely inflamed and painful. I explained to the patient that this is a benign finding within the skin that may take a long time to resolve.  Assessment: 3 - Probably benign. Short Interval Follow-Up ultrasound in 3 Months is recommended to ensure improvement/resolution. Evaluation in Breast Surgery Clinic is recommended for consideration of possible surgical intervention and clinical follow up. The patient reported  that she was prescribed clindamycin 1 day ago by her PCP for treatment of this sebaceous cyst.  BI-RADS Category:   Left: 3 - Probably Benign  Overall: 3 - Probably Benign  Recommendation:  Short Interval Follow-Up is recommended in 3 Months.  Clinical exam for correlation of finding is recommended.   Findings and recommendations were discussed in detail with the patient at the time of the examination and later in the day via telephone by Dr. AMANDO Houston.  The face to face interaction with the patient as well as the breast ultrasound evaluation was performed by the Breast Imaging Fellow, Benny Houston MD, under the supervision of the attending Radiologist.  The patient's estimated lifetime risk of breast cancer (to age 85) based on Tyrer-Cuzick - 7 risk assessment model is: Tyrer-Cuzick: 9.37 %. According to the [employer],  patients with a lifetime breast cancer risk of 20% or higher might benefit from supplemental screening tests.    Lab Results   Component Value Date    HGBA1C 11.9 (H) 12/29/2017     Assessment:      Patient is diagnosed with sebaceous cyst of the left breast.       Plan:      1. Discussed results of imaging and diagnosis of a benign sebaceous cyst of the skin of left breast.   2. Discussed management options that include observation or removal. Removal recommended as she does have uncontrolled diabetes and runs the risk of the cyst becoming infected.  3. Plan for minor procedure for WLE of sebaceous cyst on March 9th. Risks, benefits and alternative therapies discussed. Consents signed in clinic today.  4. Discussed importance of glycemic control to decrease risk of infection. She states her understanding.  5. Routine mammogram recommended as opposed to the 3 month follow up on radiology report.     Yazmin Oshea M.D.  PGY-3 ObGyn  [phone]    I have personally taken the history and examined this patient and agree with the resident's note as stated above.  Consented and  scheduled for local excision of 1 cm non-infected sebaceous cyst of Left breast UOQ in minor procedure room on Fri 3-9-18 at 9 a.m. Under local aneesthesia.  Pt states that the cyst has never been infected.

## 2018-02-01 RX ORDER — METOPROLOL SUCCINATE 100 MG/1
TABLET, EXTENDED RELEASE ORAL
Qty: 30 TABLET | Refills: 0 | OUTPATIENT
Start: 2018-02-01

## 2018-02-05 RX ORDER — ZOLPIDEM TARTRATE 5 MG/1
5 TABLET ORAL NIGHTLY PRN
Qty: 30 TABLET | Refills: 0 | Status: SHIPPED | OUTPATIENT
Start: 2018-02-05 | End: 2018-03-29 | Stop reason: SDUPTHER

## 2018-02-11 RX ORDER — METOPROLOL SUCCINATE 100 MG/1
TABLET, EXTENDED RELEASE ORAL
Qty: 30 TABLET | Refills: 0 | OUTPATIENT
Start: 2018-02-11

## 2018-02-15 ENCOUNTER — OFFICE VISIT (OUTPATIENT)
Dept: PODIATRY | Facility: CLINIC | Age: 58
End: 2018-02-15
Payer: MEDICARE

## 2018-02-15 VITALS
SYSTOLIC BLOOD PRESSURE: 130 MMHG | BODY MASS INDEX: 26.98 KG/M2 | HEIGHT: 64 IN | DIASTOLIC BLOOD PRESSURE: 80 MMHG | HEART RATE: 72 BPM | WEIGHT: 158 LBS

## 2018-02-15 DIAGNOSIS — S92.354G CLOSED NONDISPLACED FRACTURE OF FIFTH METATARSAL BONE OF RIGHT FOOT WITH DELAYED HEALING, SUBSEQUENT ENCOUNTER: Primary | ICD-10-CM

## 2018-02-15 PROCEDURE — 99999 PR PBB SHADOW E&M-EST. PATIENT-LVL III: CPT | Mod: PBBFAC,,, | Performed by: PODIATRIST

## 2018-02-15 PROCEDURE — 3008F BODY MASS INDEX DOCD: CPT | Mod: S$GLB,,, | Performed by: PODIATRIST

## 2018-02-15 PROCEDURE — 99213 OFFICE O/P EST LOW 20 MIN: CPT | Mod: S$GLB,,, | Performed by: PODIATRIST

## 2018-02-19 RX ORDER — METOPROLOL SUCCINATE 100 MG/1
TABLET, EXTENDED RELEASE ORAL
Qty: 30 TABLET | Refills: 9 | Status: SHIPPED | OUTPATIENT
Start: 2018-02-19 | End: 2019-01-10 | Stop reason: SDUPTHER

## 2018-03-01 ENCOUNTER — TELEPHONE (OUTPATIENT)
Dept: CARDIOLOGY | Facility: CLINIC | Age: 58
End: 2018-03-01

## 2018-03-01 DIAGNOSIS — I15.2 HYPERTENSION ASSOCIATED WITH DIABETES: Primary | ICD-10-CM

## 2018-03-01 DIAGNOSIS — E11.59 HYPERTENSION ASSOCIATED WITH DIABETES: Primary | ICD-10-CM

## 2018-03-01 DIAGNOSIS — E78.2 MIXED HYPERLIPIDEMIA: ICD-10-CM

## 2018-03-01 DIAGNOSIS — Z79.4 TYPE 2 DIABETES MELLITUS WITH DIABETIC PERIPHERAL ANGIOPATHY WITHOUT GANGRENE, WITH LONG-TERM CURRENT USE OF INSULIN: ICD-10-CM

## 2018-03-01 DIAGNOSIS — E11.51 TYPE 2 DIABETES MELLITUS WITH DIABETIC PERIPHERAL ANGIOPATHY WITHOUT GANGRENE, WITH LONG-TERM CURRENT USE OF INSULIN: ICD-10-CM

## 2018-03-14 ENCOUNTER — OFFICE VISIT (OUTPATIENT)
Dept: INTERNAL MEDICINE | Facility: CLINIC | Age: 58
End: 2018-03-14
Payer: MEDICARE

## 2018-03-14 ENCOUNTER — HOSPITAL ENCOUNTER (OUTPATIENT)
Dept: RADIOLOGY | Facility: HOSPITAL | Age: 58
Discharge: HOME | End: 2018-03-14
Attending: INTERNAL MEDICINE
Payer: MEDICARE

## 2018-03-14 VITALS
RESPIRATION RATE: 10 BRPM | DIASTOLIC BLOOD PRESSURE: 70 MMHG | HEART RATE: 80 BPM | SYSTOLIC BLOOD PRESSURE: 120 MMHG | BODY MASS INDEX: 26.69 KG/M2 | HEIGHT: 64 IN | WEIGHT: 156.31 LBS

## 2018-03-14 DIAGNOSIS — M89.8X1 PAIN OF RIGHT CLAVICLE: ICD-10-CM

## 2018-03-14 DIAGNOSIS — M54.2 NECK PAIN: ICD-10-CM

## 2018-03-14 DIAGNOSIS — Z79.4 TYPE 2 DIABETES MELLITUS WITH DIABETIC PERIPHERAL ANGIOPATHY WITHOUT GANGRENE, WITH LONG-TERM CURRENT USE OF INSULIN: Chronic | ICD-10-CM

## 2018-03-14 DIAGNOSIS — M89.8X1 PAIN OF RIGHT CLAVICLE: Primary | ICD-10-CM

## 2018-03-14 DIAGNOSIS — M47.812 CERVICAL ARTHRITIS: Chronic | ICD-10-CM

## 2018-03-14 DIAGNOSIS — E11.51 TYPE 2 DIABETES MELLITUS WITH DIABETIC PERIPHERAL ANGIOPATHY WITHOUT GANGRENE, WITH LONG-TERM CURRENT USE OF INSULIN: Chronic | ICD-10-CM

## 2018-03-14 PROCEDURE — 99213 OFFICE O/P EST LOW 20 MIN: CPT | Mod: S$GLB,,, | Performed by: INTERNAL MEDICINE

## 2018-03-14 PROCEDURE — 73030 X-RAY EXAM OF SHOULDER: CPT | Mod: 26,RT,, | Performed by: RADIOLOGY

## 2018-03-14 PROCEDURE — 99999 PR PBB SHADOW E&M-EST. PATIENT-LVL III: CPT | Mod: PBBFAC,,, | Performed by: INTERNAL MEDICINE

## 2018-03-14 PROCEDURE — 3074F SYST BP LT 130 MM HG: CPT | Mod: CPTII,S$GLB,, | Performed by: INTERNAL MEDICINE

## 2018-03-14 PROCEDURE — 73030 X-RAY EXAM OF SHOULDER: CPT | Mod: TC,PO,RT

## 2018-03-14 PROCEDURE — 3078F DIAST BP <80 MM HG: CPT | Mod: CPTII,S$GLB,, | Performed by: INTERNAL MEDICINE

## 2018-03-14 RX ORDER — NAPROXEN 500 MG/1
500 TABLET ORAL 2 TIMES DAILY
Qty: 60 TABLET | Refills: 1 | Status: SHIPPED | OUTPATIENT
Start: 2018-03-14 | End: 2018-10-01

## 2018-03-14 NOTE — PROGRESS NOTES
Subjective:       Patient ID: Mariann Huff is a 57 y.o. female.    Chief Complaint: Mass    HPI     58 yo female with cervical arthritis here for evaluation of neck lump.  She noticed this last week.  She reports that it has not increased in size since she noticed this.  It is tender.  She has not been sick recently.  She has no sinus pressure or congestion.  No URIs.    Patient has been started on Trulicity recently by endocrine.  She has been seeing fasting blood sugars in the 120 to 130 range and postprandial blood sugars in the 150 range.    Review of Systems    Objective:      Physical Exam   Constitutional: She is oriented to person, place, and time. She appears well-developed and well-nourished.   HENT:   Head: Normocephalic and atraumatic.   Mouth/Throat: No oropharyngeal exudate.   Eyes: EOM are normal. Pupils are equal, round, and reactive to light. Right eye exhibits no discharge. Left eye exhibits no discharge. No scleral icterus.   Neck: Normal range of motion. Neck supple. No tracheal deviation present. No thyromegaly present.   Cardiovascular: Normal rate, regular rhythm and normal heart sounds.  Exam reveals no gallop and no friction rub.    No murmur heard.  Pulmonary/Chest: Effort normal and breath sounds normal. No respiratory distress. She has no wheezes. She has no rales. She exhibits no tenderness.   Abdominal: Soft. Bowel sounds are normal. She exhibits no distension and no mass. There is no tenderness. There is no rebound and no guarding.   Musculoskeletal: Normal range of motion. She exhibits no edema or tenderness.        Arms:  Neurological: She is alert and oriented to person, place, and time.   Skin: Skin is warm and dry. No rash noted. No erythema. No pallor.   Psychiatric: She has a normal mood and affect. Her behavior is normal.   Vitals reviewed.      Assessment:       1. Pain of right clavicle    2. Neck pain    3. Type 2 diabetes mellitus with diabetic peripheral angiopathy  without gangrene, with long-term current use of insulin    4. Cervical arthritis        Plan:       1/2.  Check x-ray right shoulder, check ultrasound.  Naproxen prescribed.  3.  Continue to follow with endocrine.  Blood sugars under better control.  4.  Monitor.

## 2018-03-29 RX ORDER — ZOLPIDEM TARTRATE 5 MG/1
5 TABLET ORAL NIGHTLY PRN
Qty: 30 TABLET | Refills: 0 | Status: SHIPPED | OUTPATIENT
Start: 2018-03-29 | End: 2018-06-12 | Stop reason: SDUPTHER

## 2018-04-25 DIAGNOSIS — E11.51 TYPE 2 DIABETES MELLITUS WITH DIABETIC PERIPHERAL ANGIOPATHY WITHOUT GANGRENE, WITH LONG-TERM CURRENT USE OF INSULIN: Primary | Chronic | ICD-10-CM

## 2018-04-25 DIAGNOSIS — Z79.4 TYPE 2 DIABETES MELLITUS WITH DIABETIC PERIPHERAL ANGIOPATHY WITHOUT GANGRENE, WITH LONG-TERM CURRENT USE OF INSULIN: Primary | Chronic | ICD-10-CM

## 2018-04-25 NOTE — TELEPHONE ENCOUNTER
Attempted to call patient to both work and home contact numbers- cell # is disconnected.  Left detailed message as per Elena Kraft NP - please call and set up follow up appointment.

## 2018-05-03 ENCOUNTER — LAB VISIT (OUTPATIENT)
Dept: LAB | Facility: HOSPITAL | Age: 58
End: 2018-05-03
Attending: INTERNAL MEDICINE
Payer: MEDICARE

## 2018-05-03 DIAGNOSIS — E78.2 MIXED HYPERLIPIDEMIA: ICD-10-CM

## 2018-05-03 DIAGNOSIS — I15.2 HYPERTENSION ASSOCIATED WITH DIABETES: ICD-10-CM

## 2018-05-03 DIAGNOSIS — Z79.4 TYPE 2 DIABETES MELLITUS WITH DIABETIC PERIPHERAL ANGIOPATHY WITHOUT GANGRENE, WITH LONG-TERM CURRENT USE OF INSULIN: ICD-10-CM

## 2018-05-03 DIAGNOSIS — E11.59 HYPERTENSION ASSOCIATED WITH DIABETES: ICD-10-CM

## 2018-05-03 DIAGNOSIS — E11.51 TYPE 2 DIABETES MELLITUS WITH DIABETIC PERIPHERAL ANGIOPATHY WITHOUT GANGRENE, WITH LONG-TERM CURRENT USE OF INSULIN: ICD-10-CM

## 2018-05-03 LAB
ALBUMIN SERPL BCP-MCNC: 4 G/DL
ALP SERPL-CCNC: 72 U/L
ALT SERPL W/O P-5'-P-CCNC: 17 U/L
ANION GAP SERPL CALC-SCNC: 11 MMOL/L
AST SERPL-CCNC: 21 U/L
BILIRUB SERPL-MCNC: 0.6 MG/DL
BUN SERPL-MCNC: 14 MG/DL
CALCIUM SERPL-MCNC: 10.3 MG/DL
CHLORIDE SERPL-SCNC: 105 MMOL/L
CHOLEST SERPL-MCNC: 189 MG/DL
CHOLEST/HDLC SERPL: 3.9 {RATIO}
CO2 SERPL-SCNC: 26 MMOL/L
CREAT SERPL-MCNC: 0.9 MG/DL
EST. GFR  (AFRICAN AMERICAN): >60 ML/MIN/1.73 M^2
EST. GFR  (NON AFRICAN AMERICAN): >60 ML/MIN/1.73 M^2
ESTIMATED AVG GLUCOSE: 217 MG/DL
GLUCOSE SERPL-MCNC: 106 MG/DL
HBA1C MFR BLD HPLC: 9.2 %
HDLC SERPL-MCNC: 49 MG/DL
HDLC SERPL: 25.9 %
LDLC SERPL CALC-MCNC: 124.2 MG/DL
NONHDLC SERPL-MCNC: 140 MG/DL
POTASSIUM SERPL-SCNC: 3.8 MMOL/L
PROT SERPL-MCNC: 7.9 G/DL
SODIUM SERPL-SCNC: 142 MMOL/L
TRIGL SERPL-MCNC: 79 MG/DL
TSH SERPL DL<=0.005 MIU/L-ACNC: 1.58 UIU/ML

## 2018-05-03 PROCEDURE — 83036 HEMOGLOBIN GLYCOSYLATED A1C: CPT

## 2018-05-03 PROCEDURE — 36415 COLL VENOUS BLD VENIPUNCTURE: CPT | Mod: PO

## 2018-05-03 PROCEDURE — 80053 COMPREHEN METABOLIC PANEL: CPT

## 2018-05-03 PROCEDURE — 84443 ASSAY THYROID STIM HORMONE: CPT

## 2018-05-03 PROCEDURE — 80061 LIPID PANEL: CPT

## 2018-05-04 ENCOUNTER — TELEPHONE (OUTPATIENT)
Dept: CARDIOLOGY | Facility: CLINIC | Age: 58
End: 2018-05-04

## 2018-05-04 RX ORDER — INSULIN ASPART 100 [IU]/ML
INJECTION, SOLUTION INTRAVENOUS; SUBCUTANEOUS
Qty: 30 SYRINGE | Refills: 1 | Status: SHIPPED | OUTPATIENT
Start: 2018-05-04 | End: 2018-12-07 | Stop reason: SDUPTHER

## 2018-05-04 NOTE — TELEPHONE ENCOUNTER
----- Message from Adelaide Aguilar MD sent at 5/4/2018  1:36 AM CDT -----  Please mail patient the blood work results and inform the patient that we will discuss it in detail during the upcoming visit. Diabetes control is much better, but not yet at goal. The rest of the labs are essentially unchanged

## 2018-05-07 DIAGNOSIS — Z79.4 TYPE 2 DIABETES MELLITUS WITH DIABETIC PERIPHERAL ANGIOPATHY WITHOUT GANGRENE, WITH LONG-TERM CURRENT USE OF INSULIN: Primary | Chronic | ICD-10-CM

## 2018-05-07 DIAGNOSIS — E11.51 TYPE 2 DIABETES MELLITUS WITH DIABETIC PERIPHERAL ANGIOPATHY WITHOUT GANGRENE, WITH LONG-TERM CURRENT USE OF INSULIN: Primary | Chronic | ICD-10-CM

## 2018-05-09 RX ORDER — DULAGLUTIDE 0.75 MG/.5ML
INJECTION, SOLUTION SUBCUTANEOUS
Qty: 2 SYRINGE | Refills: 3 | OUTPATIENT
Start: 2018-05-09

## 2018-05-10 ENCOUNTER — OFFICE VISIT (OUTPATIENT)
Dept: CARDIOLOGY | Facility: CLINIC | Age: 58
End: 2018-05-10
Payer: MEDICARE

## 2018-05-10 VITALS
DIASTOLIC BLOOD PRESSURE: 80 MMHG | HEIGHT: 64 IN | SYSTOLIC BLOOD PRESSURE: 160 MMHG | HEART RATE: 80 BPM | BODY MASS INDEX: 27.4 KG/M2 | WEIGHT: 160.5 LBS

## 2018-05-10 DIAGNOSIS — I15.2 HYPERTENSION ASSOCIATED WITH DIABETES: Chronic | ICD-10-CM

## 2018-05-10 DIAGNOSIS — Z95.5 S/P CORONARY ARTERY STENT PLACEMENT: ICD-10-CM

## 2018-05-10 DIAGNOSIS — E78.2 MIXED HYPERLIPIDEMIA: Chronic | ICD-10-CM

## 2018-05-10 DIAGNOSIS — I65.23 CAROTID STENOSIS, BILATERAL: Chronic | ICD-10-CM

## 2018-05-10 DIAGNOSIS — T82.855S CORONARY STENT RESTENOSIS, SEQUELA: ICD-10-CM

## 2018-05-10 DIAGNOSIS — Z91.199 NON COMPLIANCE WITH MEDICAL TREATMENT: Chronic | ICD-10-CM

## 2018-05-10 DIAGNOSIS — I25.10 CORONARY ARTERY DISEASE DUE TO LIPID RICH PLAQUE: Primary | Chronic | ICD-10-CM

## 2018-05-10 DIAGNOSIS — E11.59 HYPERTENSION ASSOCIATED WITH DIABETES: Chronic | ICD-10-CM

## 2018-05-10 DIAGNOSIS — G47.30 SLEEP APNEA, UNSPECIFIED TYPE: Chronic | ICD-10-CM

## 2018-05-10 DIAGNOSIS — K76.0 FATTY LIVER DISEASE, NONALCOHOLIC: ICD-10-CM

## 2018-05-10 DIAGNOSIS — Z79.4 TYPE 2 DIABETES MELLITUS WITH DIABETIC PERIPHERAL ANGIOPATHY WITHOUT GANGRENE, WITH LONG-TERM CURRENT USE OF INSULIN: Chronic | ICD-10-CM

## 2018-05-10 DIAGNOSIS — E11.51 TYPE 2 DIABETES MELLITUS WITH DIABETIC PERIPHERAL ANGIOPATHY WITHOUT GANGRENE, WITH LONG-TERM CURRENT USE OF INSULIN: Chronic | ICD-10-CM

## 2018-05-10 DIAGNOSIS — I25.83 CORONARY ARTERY DISEASE DUE TO LIPID RICH PLAQUE: Primary | Chronic | ICD-10-CM

## 2018-05-10 PROCEDURE — 3079F DIAST BP 80-89 MM HG: CPT | Mod: CPTII,S$GLB,, | Performed by: INTERNAL MEDICINE

## 2018-05-10 PROCEDURE — 3077F SYST BP >= 140 MM HG: CPT | Mod: CPTII,S$GLB,, | Performed by: INTERNAL MEDICINE

## 2018-05-10 PROCEDURE — 99214 OFFICE O/P EST MOD 30 MIN: CPT | Mod: S$GLB,,, | Performed by: INTERNAL MEDICINE

## 2018-05-10 PROCEDURE — 3046F HEMOGLOBIN A1C LEVEL >9.0%: CPT | Mod: CPTII,S$GLB,, | Performed by: INTERNAL MEDICINE

## 2018-05-10 PROCEDURE — 99999 PR PBB SHADOW E&M-EST. PATIENT-LVL III: CPT | Mod: PBBFAC,,, | Performed by: INTERNAL MEDICINE

## 2018-05-10 PROCEDURE — 3008F BODY MASS INDEX DOCD: CPT | Mod: CPTII,S$GLB,, | Performed by: INTERNAL MEDICINE

## 2018-05-10 NOTE — PROGRESS NOTES
"Subjective:   Patient ID:  Mariann Huff is a 58 y.o. female who presents for follow-up of Hypertension      HPI: For the past two weeks on-off left arm discomfort , occurring every other day or so and lasting few minutes.  It is unprovoked.  In the past patient had shortness of breath as an angina equivalent.  This is a new symptom.  She is overall trying to adhere to diet and be more compliant. HbA1c is better, lipids are better.  BP is still not at goal    Lab Results   Component Value Date     05/03/2018    K 3.8 05/03/2018     05/03/2018    CO2 26 05/03/2018    BUN 14 05/03/2018    CREATININE 0.9 05/03/2018     05/03/2018    HGBA1C 9.2 (H) 05/03/2018    MG 2.6 10/09/2015    AST 21 05/03/2018    ALT 17 05/03/2018    ALBUMIN 4.0 05/03/2018    PROT 7.9 05/03/2018    BILITOT 0.6 05/03/2018    WBC 8.07 03/06/2017    HGB 11.2 (L) 03/06/2017    HCT 36.3 (L) 03/06/2017    MCV 83 03/06/2017     03/06/2017    INR 0.9 02/17/2014    TSH 1.577 05/03/2018    CHOL 189 05/03/2018    HDL 49 05/03/2018    LDLCALC 124.2 05/03/2018    TRIG 79 05/03/2018       Review of Systems   Constitution: Negative.   HENT: Negative.    Eyes: Negative.    Cardiovascular: Positive for dyspnea on exertion. Negative for chest pain, claudication, irregular heartbeat, leg swelling, near-syncope, palpitations and syncope.   Respiratory: Negative.  Negative for cough, shortness of breath, snoring and wheezing.    Endocrine: Negative.  Negative for cold intolerance, heat intolerance, polydipsia, polyphagia and polyuria.   Skin: Negative.    Musculoskeletal: Positive for back pain.   Gastrointestinal: Negative.    Genitourinary: Negative.    Neurological: Negative.    Psychiatric/Behavioral: Negative.        Objective:   Physical Exam   Constitutional: She is oriented to person, place, and time. She appears well-developed and well-nourished.   BP (!) 160/80   Pulse 80   Ht 5' 4" (1.626 m)   Wt 72.8 kg (160 lb 7.9 oz)   " BMI 27.55 kg/m²      HENT:   Head: Normocephalic.   Eyes: Pupils are equal, round, and reactive to light.   Neck: Normal range of motion. Neck supple. Carotid bruit is not present. No thyromegaly present.   Cardiovascular: Normal rate, regular rhythm, normal heart sounds and intact distal pulses.  Exam reveals no gallop and no friction rub.    No murmur heard.  Pulses:       Carotid pulses are 2+ on the right side, and 2+ on the left side.       Radial pulses are 2+ on the right side, and 2+ on the left side.        Femoral pulses are 2+ on the right side, and 2+ on the left side.       Popliteal pulses are 2+ on the right side, and 2+ on the left side.        Dorsalis pedis pulses are 2+ on the right side, and 2+ on the left side.        Posterior tibial pulses are 2+ on the right side, and 2+ on the left side.   Pulmonary/Chest: Effort normal and breath sounds normal. No respiratory distress. She has no wheezes. She has no rales. She exhibits no tenderness.   Abdominal: Soft. Bowel sounds are normal.   obese   Musculoskeletal: Normal range of motion. She exhibits no edema.   Neurological: She is alert and oriented to person, place, and time.   Skin: Skin is warm and dry.   Psychiatric: She has a normal mood and affect.   Nursing note and vitals reviewed.        Assessment and Plan:     Problem List Items Addressed This Visit        Cardiology Problems    Hypertension associated with diabetes (Chronic)    Relevant Orders    Cardiac PET Scan Stress    Hyperlipidemia (Chronic)    Relevant Orders    Cardiac PET Scan Stress    CAD (coronary artery disease) - Primary (Chronic)    Relevant Orders    Cardiac PET Scan Stress    Carotid stenosis, bilateral (Chronic)    Relevant Orders    Cardiac PET Scan Stress    Type 2 diabetes mellitus with diabetic peripheral angiopathy without gangrene (Chronic)    Relevant Orders    Cardiac PET Scan Stress       Other    Sleep apnea (Chronic)    Relevant Orders    Cardiac PET Scan  "Stress    Non compliance with medical treatment (Chronic)    Relevant Orders    Cardiac PET Scan Stress    Coronary stent restenosis    Relevant Orders    Cardiac PET Scan Stress    S/P coronary artery stent placement    Relevant Orders    Cardiac PET Scan Stress    Fatty liver disease, nonalcoholic    Relevant Orders    Cardiac PET Scan Stress          Patient's Medications   New Prescriptions    No medications on file   Previous Medications    ACCU-CHEK EDIN PLUS METER MISC        ALBUTEROL 90 MCG/ACTUATION INHALER    Inhale 1-2 puffs into the lungs every 6 (six) hours as needed.    AMLODIPINE (NORVASC) 10 MG TABLET    TAKE 1 TABLET (10 MG TOTAL) BY MOUTH ONCE DAILY.    ASPIRIN 81 MG TAB    Take 81 mg by mouth. 1 Tablet Oral Every day    ATORVASTATIN (LIPITOR) 40 MG TABLET    TAKE 1 TABLET (40 MG TOTAL) BY MOUTH ONCE DAILY.    BD INSULIN PEN NEEDLE UF MINI 31 X 3/16 " NDLE    USE 4 TIMES A DAY    BLOOD SUGAR DIAGNOSTIC (ACCU-CHEK EDIN PLUS TEST STRP) STRP    TEST BLOOD SUGARS 4 TIMES DAILY    CHLORTHALIDONE (HYGROTEN) 25 MG TAB    Take 1 tablet (25 mg total) by mouth once daily.    CYCLOBENZAPRINE (FLEXERIL) 10 MG TABLET    TAKE 1 TABLET BY MOUTH 3 TIMES A DAY AS NEEDED FOR MUSCLE SPASMS. NO WORKING OR DRIVING ON THIS MED    DULAGLUTIDE (TRULICITY) 1.5 MG/0.5 ML PNIJ    Inject 1.5 mg into the skin every 7 days.    ESOMEPRAZOLE (NEXIUM) 40 MG CAPSULE    Take 1 capsule (40 mg total) by mouth before breakfast.    FENOFIBRATE MICRONIZED (LOFIBRA) 134 MG CAP    TAKE 1 CAPSULE (134 MG TOTAL) BY MOUTH BEFORE BREAKFAST.    INSULIN GLARGINE, TOUJEO, (TOUJEO SOLOSTAR) 300 UNIT/ML (1.5 ML) INPN PEN    Inject 50 Units into the skin 2 (two) times daily.    INVOKANA 300 MG TAB TABLET    Take 1 tablet (300 mg total) by mouth once daily.    LANCETS 30 GAUGE MISC        LOSARTAN (COZAAR) 100 MG TABLET    TAKE 1 TABLET (100 MG TOTAL) BY MOUTH ONCE DAILY.    METOPROLOL SUCCINATE (TOPROL-XL) 100 MG 24 HR TABLET    TAKE 1 TABLET " "(100 MG TOTAL) BY MOUTH ONCE DAILY.    NAPROXEN (NAPROSYN) 500 MG TABLET    Take 1 tablet (500 mg total) by mouth 2 (two) times daily.    NITROGLYCERIN (NITROSTAT) 0.4 MG SL TABLET    Take one every 2-3 min till chestpain relief for 3 times and if still no relief, call MD or come to ED    NOVOLOG FLEXPEN U-100 INSULIN 100 UNIT/ML INPN PEN    INJECT 30 U AM, 40 U AT NOON, 30 U PM.150-200 +2, 201-250 +4, 251-300 +6, 301-350 +8, >350 +10    OMEGA-3 ACID ETHYL ESTERS (LOVAZA) 1 GRAM CAPSULE    Take 1 g by mouth once daily.     PEN NEEDLE, DIABETIC (BD ULTRA-FINE GRABIEL PEN NEEDLES) 32 GAUGE X 5/32" NDLE    For use with insulin pens daily 4 times a day    PRASUGREL (EFFIENT) 10 MG TAB    TAKE 1 TABLET (10 MG TOTAL) BY MOUTH ONCE DAILY.    SURE COMFORT PEN NEEDLE 31 GAUGE X 5/16" NDLE        TRAMADOL (ULTRAM) 50 MG TABLET    Take 1 tablet (50 mg total) by mouth 3 (three) times daily as needed for Pain.    ZOLPIDEM (AMBIEN) 5 MG TAB    TAKE 1 TABLET (5 MG TOTAL) BY MOUTH NIGHTLY AS NEEDED.   Modified Medications    No medications on file   Discontinued Medications    No medications on file     Symptoms might be angina equivalent.  Schedule PET scan and advise.  Continue on the present regimen for now,avoid salt,.    Follow-up in about 6 months (around 11/10/2018).          "

## 2018-05-14 ENCOUNTER — CLINICAL SUPPORT (OUTPATIENT)
Dept: CARDIOLOGY | Facility: CLINIC | Age: 58
End: 2018-05-14
Attending: INTERNAL MEDICINE
Payer: MEDICARE

## 2018-05-14 ENCOUNTER — TELEPHONE (OUTPATIENT)
Dept: CARDIOLOGY | Facility: CLINIC | Age: 58
End: 2018-05-14

## 2018-05-14 DIAGNOSIS — I65.23 CAROTID STENOSIS, BILATERAL: Chronic | ICD-10-CM

## 2018-05-14 DIAGNOSIS — I25.10 CORONARY ARTERY DISEASE DUE TO LIPID RICH PLAQUE: Chronic | ICD-10-CM

## 2018-05-14 DIAGNOSIS — E78.2 MIXED HYPERLIPIDEMIA: Chronic | ICD-10-CM

## 2018-05-14 DIAGNOSIS — I15.2 HYPERTENSION ASSOCIATED WITH DIABETES: Chronic | ICD-10-CM

## 2018-05-14 DIAGNOSIS — K76.0 FATTY LIVER DISEASE, NONALCOHOLIC: ICD-10-CM

## 2018-05-14 DIAGNOSIS — T82.855S CORONARY STENT RESTENOSIS, SEQUELA: ICD-10-CM

## 2018-05-14 DIAGNOSIS — Z95.5 S/P CORONARY ARTERY STENT PLACEMENT: ICD-10-CM

## 2018-05-14 DIAGNOSIS — E11.59 HYPERTENSION ASSOCIATED WITH DIABETES: Chronic | ICD-10-CM

## 2018-05-14 DIAGNOSIS — Z79.4 TYPE 2 DIABETES MELLITUS WITH DIABETIC PERIPHERAL ANGIOPATHY WITHOUT GANGRENE, WITH LONG-TERM CURRENT USE OF INSULIN: Chronic | ICD-10-CM

## 2018-05-14 DIAGNOSIS — Z91.199 NON COMPLIANCE WITH MEDICAL TREATMENT: Chronic | ICD-10-CM

## 2018-05-14 DIAGNOSIS — I25.83 CORONARY ARTERY DISEASE DUE TO LIPID RICH PLAQUE: Chronic | ICD-10-CM

## 2018-05-14 DIAGNOSIS — E11.51 TYPE 2 DIABETES MELLITUS WITH DIABETIC PERIPHERAL ANGIOPATHY WITHOUT GANGRENE, WITH LONG-TERM CURRENT USE OF INSULIN: Chronic | ICD-10-CM

## 2018-05-14 DIAGNOSIS — G47.30 SLEEP APNEA, UNSPECIFIED TYPE: Chronic | ICD-10-CM

## 2018-05-14 LAB — DIASTOLIC DYSFUNCTION: NO

## 2018-05-14 PROCEDURE — 93015 CV STRESS TEST SUPVJ I&R: CPT | Mod: S$GLB,,, | Performed by: INTERNAL MEDICINE

## 2018-05-14 PROCEDURE — A9555 RB82 RUBIDIUM: HCPCS | Mod: S$GLB,,, | Performed by: INTERNAL MEDICINE

## 2018-05-14 PROCEDURE — 78492 MYOCRD IMG PET MLT RST&STRS: CPT | Mod: S$GLB,,, | Performed by: INTERNAL MEDICINE

## 2018-05-14 NOTE — TELEPHONE ENCOUNTER
----- Message from Adelaide Aguilar MD sent at 5/14/2018  3:50 PM CDT -----  Esme, please review results of your PET scan . It was normal. Great news.  Please continue on the present medicaitons and let me know if your symptoms continue.

## 2018-06-12 RX ORDER — ZOLPIDEM TARTRATE 5 MG/1
TABLET ORAL
Qty: 30 TABLET | Refills: 2 | Status: SHIPPED | OUTPATIENT
Start: 2018-06-12 | End: 2018-12-07 | Stop reason: SDUPTHER

## 2018-07-06 DIAGNOSIS — E11.51 TYPE 2 DIABETES MELLITUS WITH DIABETIC PERIPHERAL ANGIOPATHY WITHOUT GANGRENE, WITHOUT LONG-TERM CURRENT USE OF INSULIN: ICD-10-CM

## 2018-07-06 RX ORDER — CANAGLIFLOZIN 300 MG/1
300 TABLET, FILM COATED ORAL DAILY
Qty: 30 TABLET | Refills: 4 | Status: SHIPPED | OUTPATIENT
Start: 2018-07-06 | End: 2018-07-23 | Stop reason: SDUPTHER

## 2018-07-20 DIAGNOSIS — I10 HTN (HYPERTENSION), BENIGN: Chronic | ICD-10-CM

## 2018-07-20 RX ORDER — LOSARTAN POTASSIUM 100 MG/1
100 TABLET ORAL DAILY
Qty: 30 TABLET | Refills: 8 | Status: SHIPPED | OUTPATIENT
Start: 2018-07-20 | End: 2018-09-10

## 2018-07-23 ENCOUNTER — OFFICE VISIT (OUTPATIENT)
Dept: INTERNAL MEDICINE | Facility: CLINIC | Age: 58
End: 2018-07-23
Payer: MEDICARE

## 2018-07-23 VITALS
BODY MASS INDEX: 27.74 KG/M2 | DIASTOLIC BLOOD PRESSURE: 84 MMHG | HEIGHT: 64 IN | OXYGEN SATURATION: 98 % | WEIGHT: 162.5 LBS | HEART RATE: 68 BPM | TEMPERATURE: 99 F | RESPIRATION RATE: 18 BRPM | SYSTOLIC BLOOD PRESSURE: 162 MMHG

## 2018-07-23 DIAGNOSIS — E11.51 TYPE 2 DIABETES MELLITUS WITH DIABETIC PERIPHERAL ANGIOPATHY WITHOUT GANGRENE, WITHOUT LONG-TERM CURRENT USE OF INSULIN: ICD-10-CM

## 2018-07-23 DIAGNOSIS — M47.816 ARTHRITIS OF LUMBAR SPINE: Chronic | ICD-10-CM

## 2018-07-23 PROCEDURE — 99214 OFFICE O/P EST MOD 30 MIN: CPT | Mod: S$PBB,,, | Performed by: INTERNAL MEDICINE

## 2018-07-23 PROCEDURE — 99215 OFFICE O/P EST HI 40 MIN: CPT | Mod: PBBFAC,PO,25 | Performed by: INTERNAL MEDICINE

## 2018-07-23 PROCEDURE — 96372 THER/PROPH/DIAG INJ SC/IM: CPT | Mod: PBBFAC,PO

## 2018-07-23 PROCEDURE — 99999 PR PBB SHADOW E&M-EST. PATIENT-LVL V: CPT | Mod: PBBFAC,,, | Performed by: INTERNAL MEDICINE

## 2018-07-23 RX ORDER — CANAGLIFLOZIN 300 MG/1
300 TABLET, FILM COATED ORAL DAILY
Qty: 30 TABLET | Refills: 6 | Status: SHIPPED | OUTPATIENT
Start: 2018-07-23 | End: 2018-07-23 | Stop reason: SDUPTHER

## 2018-07-23 RX ORDER — CYCLOBENZAPRINE HCL 10 MG
TABLET ORAL
Qty: 45 TABLET | Refills: 5 | Status: ON HOLD | OUTPATIENT
Start: 2018-07-23 | End: 2019-05-08

## 2018-07-23 RX ORDER — CANAGLIFLOZIN 300 MG/1
300 TABLET, FILM COATED ORAL DAILY
Qty: 30 TABLET | Refills: 1 | Status: SHIPPED | OUTPATIENT
Start: 2018-07-23 | End: 2018-10-26 | Stop reason: SDUPTHER

## 2018-07-23 RX ORDER — GABAPENTIN 100 MG/1
200 CAPSULE ORAL NIGHTLY
Qty: 60 CAPSULE | Refills: 11 | Status: ON HOLD | OUTPATIENT
Start: 2018-07-23 | End: 2019-07-02 | Stop reason: HOSPADM

## 2018-07-23 RX ORDER — KETOROLAC TROMETHAMINE 30 MG/ML
30 INJECTION, SOLUTION INTRAMUSCULAR; INTRAVENOUS
Status: COMPLETED | OUTPATIENT
Start: 2018-07-23 | End: 2018-07-23

## 2018-07-23 RX ADMIN — KETOROLAC TROMETHAMINE 30 MG: 30 INJECTION, SOLUTION INTRAMUSCULAR; INTRAVENOUS at 11:07

## 2018-07-23 NOTE — PROGRESS NOTES
Subjective:       Patient ID: Mariann Huff is a 58 y.o. female.    Chief Complaint: Low-back Pain (6/10 pain level x 1 week)    HPI     58-year-old female with diabetes here for evaluation of back pain.  This started bothering her last week.  This is the same problem as she has had before.  She has a lumbar xray showing arthritis of her lumbar spine. This episode started during the day.  She took some advil and flexeril.  She has been lying down.  She has not been using ice or heat on her back.  She went to PT last year without much help of her symptoms.    Review of Systems    Objective:      Physical Exam   Constitutional: She is oriented to person, place, and time. She appears well-developed and well-nourished.   Difficulty moving and sitting up   HENT:   Head: Normocephalic and atraumatic.   Mouth/Throat: No oropharyngeal exudate.   Eyes: EOM are normal. Pupils are equal, round, and reactive to light. Right eye exhibits no discharge. Left eye exhibits no discharge. No scleral icterus.   Neck: Normal range of motion. Neck supple. No tracheal deviation present. No thyromegaly present.   Cardiovascular: Normal rate, regular rhythm and normal heart sounds.  Exam reveals no gallop and no friction rub.    No murmur heard.  Pulmonary/Chest: Effort normal and breath sounds normal. No respiratory distress. She has no wheezes. She has no rales. She exhibits no tenderness.   Abdominal: Soft. Bowel sounds are normal. She exhibits no distension and no mass. There is no tenderness. There is no rebound and no guarding.   Musculoskeletal: Normal range of motion. She exhibits no edema or tenderness.   Neurological: She is alert and oriented to person, place, and time.   Skin: Skin is warm and dry. No rash noted. No erythema. No pallor.   Psychiatric: She has a normal mood and affect. Her behavior is normal.   Vitals reviewed.      Assessment:       1. Arthritis of lumbar spine    2. Type 2 diabetes mellitus with diabetic  peripheral angiopathy without gangrene, without long-term current use of insulin        Plan:       1.  Toradol 30 mg IM x1, naproxen 500 mg b.i.d., Flexeril 10 mg t.i.d. p.r.n..  Gabapentin 200 mg nightly as needed.  Refer to Pain Management encounter.  2.  Refill of Invokana given.

## 2018-08-15 DIAGNOSIS — I10 HTN (HYPERTENSION), BENIGN: Chronic | ICD-10-CM

## 2018-08-15 RX ORDER — AMLODIPINE BESYLATE 10 MG/1
TABLET ORAL
Qty: 30 TABLET | Refills: 6 | Status: ON HOLD | OUTPATIENT
Start: 2018-08-15 | End: 2019-07-02 | Stop reason: HOSPADM

## 2018-08-16 ENCOUNTER — OFFICE VISIT (OUTPATIENT)
Dept: INTERNAL MEDICINE | Facility: CLINIC | Age: 58
End: 2018-08-16
Payer: MEDICARE

## 2018-08-16 VITALS
RESPIRATION RATE: 16 BRPM | OXYGEN SATURATION: 98 % | DIASTOLIC BLOOD PRESSURE: 84 MMHG | HEIGHT: 64 IN | WEIGHT: 160.06 LBS | SYSTOLIC BLOOD PRESSURE: 144 MMHG | HEART RATE: 83 BPM | BODY MASS INDEX: 27.33 KG/M2 | TEMPERATURE: 98 F

## 2018-08-16 DIAGNOSIS — N39.0 URINARY TRACT INFECTION WITHOUT HEMATURIA, SITE UNSPECIFIED: Primary | ICD-10-CM

## 2018-08-16 LAB
BACTERIA #/AREA URNS AUTO: ABNORMAL /HPF
BILIRUB SERPL-MCNC: NEGATIVE MG/DL
BILIRUB UR QL STRIP: NEGATIVE
BLOOD URINE, POC: >50
CLARITY UR REFRACT.AUTO: ABNORMAL
COLOR UR AUTO: YELLOW
COLOR, POC UA: YELLOW
GLUCOSE UR QL STRIP: >1000
GLUCOSE UR QL STRIP: ABNORMAL
HGB UR QL STRIP: ABNORMAL
HYALINE CASTS UR QL AUTO: 0 /LPF
KETONES UR QL STRIP: NEGATIVE
KETONES UR QL STRIP: NEGATIVE
LEUKOCYTE ESTERASE UR QL STRIP: ABNORMAL
LEUKOCYTE ESTERASE URINE, POC: POSITIVE
MICROSCOPIC COMMENT: ABNORMAL
NITRITE UR QL STRIP: POSITIVE
NITRITE, POC UA: NEGATIVE
PH UR STRIP: 5 [PH] (ref 5–8)
PH, POC UA: 5
PROT UR QL STRIP: ABNORMAL
PROTEIN, POC: POSITIVE
RBC #/AREA URNS AUTO: 11 /HPF (ref 0–4)
SP GR UR STRIP: 1.03 (ref 1–1.03)
SPECIFIC GRAVITY, POC UA: 1.01
SQUAMOUS #/AREA URNS AUTO: 7 /HPF
URN SPEC COLLECT METH UR: ABNORMAL
UROBILINOGEN UR STRIP-ACNC: NEGATIVE EU/DL
UROBILINOGEN, POC UA: NORMAL
WBC #/AREA URNS AUTO: 10 /HPF (ref 0–5)
YEAST UR QL AUTO: ABNORMAL

## 2018-08-16 PROCEDURE — 99999 PR PBB SHADOW E&M-EST. PATIENT-LVL IV: CPT | Mod: PBBFAC,,, | Performed by: FAMILY MEDICINE

## 2018-08-16 PROCEDURE — 87088 URINE BACTERIA CULTURE: CPT

## 2018-08-16 PROCEDURE — 87086 URINE CULTURE/COLONY COUNT: CPT

## 2018-08-16 PROCEDURE — 81002 URINALYSIS NONAUTO W/O SCOPE: CPT | Mod: PBBFAC,PO | Performed by: FAMILY MEDICINE

## 2018-08-16 PROCEDURE — 87186 SC STD MICRODIL/AGAR DIL: CPT

## 2018-08-16 PROCEDURE — 99214 OFFICE O/P EST MOD 30 MIN: CPT | Mod: S$GLB,,, | Performed by: FAMILY MEDICINE

## 2018-08-16 PROCEDURE — 99214 OFFICE O/P EST MOD 30 MIN: CPT | Mod: PBBFAC,PO | Performed by: FAMILY MEDICINE

## 2018-08-16 PROCEDURE — 87077 CULTURE AEROBIC IDENTIFY: CPT

## 2018-08-16 PROCEDURE — 81001 URINALYSIS AUTO W/SCOPE: CPT

## 2018-08-16 RX ORDER — SULFAMETHOXAZOLE AND TRIMETHOPRIM 800; 160 MG/1; MG/1
1 TABLET ORAL 2 TIMES DAILY
Qty: 14 TABLET | Refills: 0 | Status: SHIPPED | OUTPATIENT
Start: 2018-08-16 | End: 2018-08-23

## 2018-08-16 NOTE — PROGRESS NOTES
Subjective:       Patient ID: Mariann Huff is a 58 y.o. female.    Chief Complaint: Flank Pain (RT side pain; possible UTI)    HPI 58-year-old  female presents to clinic today secondary to a complaint of urinary frequency, pain with urination, increased urge to urinate, and mild right-sided flank pain for the past 2 weeks.  She has taken no medications for these symptoms.  Review of Systems   Constitutional: Negative for appetite change, chills, fatigue and fever.   HENT: Negative for congestion, ear pain, hearing loss, postnasal drip, rhinorrhea, sinus pressure, sore throat and tinnitus.    Eyes: Negative for redness, itching and visual disturbance.   Respiratory: Negative for cough, chest tightness and shortness of breath.    Cardiovascular: Negative for chest pain and palpitations.   Gastrointestinal: Negative for abdominal pain, constipation, diarrhea, nausea and vomiting.   Genitourinary: Positive for dysuria, frequency and urgency. Negative for decreased urine volume, difficulty urinating and hematuria.   Musculoskeletal: Negative for back pain, myalgias, neck pain and neck stiffness.   Skin: Negative for rash.   Neurological: Negative for dizziness, light-headedness and headaches.   Psychiatric/Behavioral: Negative.        Objective:      Physical Exam   Constitutional: She is oriented to person, place, and time. She appears well-developed and well-nourished. No distress.   HENT:   Head: Normocephalic and atraumatic.   Right Ear: External ear normal. A middle ear effusion is present.   Left Ear: External ear normal. A middle ear effusion is present.   Nose: Mucosal edema and rhinorrhea present. No nose lacerations, sinus tenderness, nasal deformity, septal deviation or nasal septal hematoma. No epistaxis.  No foreign bodies. Right sinus exhibits maxillary sinus tenderness and frontal sinus tenderness. Left sinus exhibits maxillary sinus tenderness and frontal sinus tenderness.    Mouth/Throat: Oropharynx is clear and moist. No oropharyngeal exudate.   Eyes: Conjunctivae and EOM are normal. Pupils are equal, round, and reactive to light. Right eye exhibits no discharge. Left eye exhibits no discharge. No scleral icterus.   Neck: Normal range of motion. Neck supple. No JVD present. No tracheal deviation present. No thyromegaly present.   Cardiovascular: Normal rate, regular rhythm, normal heart sounds and intact distal pulses. Exam reveals no gallop and no friction rub.   No murmur heard.  Pulmonary/Chest: Effort normal and breath sounds normal. No stridor. No respiratory distress. She has no wheezes. She has no rales.   Abdominal: Soft. Bowel sounds are normal. She exhibits no distension and no mass. There is no tenderness. There is no rebound and no guarding.   Musculoskeletal: Normal range of motion. She exhibits no edema or tenderness.   Lymphadenopathy:     She has no cervical adenopathy.   Neurological: She is alert and oriented to person, place, and time.   Skin: Skin is warm and dry. No rash noted. She is not diaphoretic. No erythema. No pallor.   Psychiatric: She has a normal mood and affect. Her behavior is normal. Judgment and thought content normal.   Nursing note and vitals reviewed.      Assessment:       1. Urinary tract infection without hematuria, site unspecified        Plan:       Urinary tract infection without hematuria, site unspecified  -     POCT URINE DIPSTICK WITHOUT MICROSCOPE  -     Urinalysis  -     Urine culture  -     sulfamethoxazole-trimethoprim 800-160mg (BACTRIM DS) 800-160 mg Tab; Take 1 tablet by mouth 2 (two) times daily. for 7 days  Dispense: 14 tablet; Refill: 0      Return to clinic as needed if symptoms persist or worsen.

## 2018-08-20 ENCOUNTER — TELEPHONE (OUTPATIENT)
Dept: INTERNAL MEDICINE | Facility: CLINIC | Age: 58
End: 2018-08-20

## 2018-08-20 LAB — BACTERIA UR CULT: NORMAL

## 2018-08-20 NOTE — TELEPHONE ENCOUNTER
Spoke with pt re: positive for a urinary infection. Take the bactrim ABX until it is completely gone.    Pt verbalized understanding

## 2018-08-20 NOTE — TELEPHONE ENCOUNTER
----- Message from Florin Kurtz MD sent at 8/20/2018 12:55 PM CDT -----  The urine culture results show the generic bactrimDS prescribed is appropriate. Take it to completion if she hasn't already. Thank you.

## 2018-09-04 ENCOUNTER — OFFICE VISIT (OUTPATIENT)
Dept: PAIN MEDICINE | Facility: CLINIC | Age: 58
End: 2018-09-04
Payer: MEDICARE

## 2018-09-04 VITALS
SYSTOLIC BLOOD PRESSURE: 131 MMHG | BODY MASS INDEX: 27.8 KG/M2 | WEIGHT: 162.81 LBS | HEIGHT: 64 IN | DIASTOLIC BLOOD PRESSURE: 68 MMHG | HEART RATE: 89 BPM

## 2018-09-04 DIAGNOSIS — M51.26 HERNIATED LUMBAR INTERVERTEBRAL DISC: Primary | ICD-10-CM

## 2018-09-04 DIAGNOSIS — M54.16 LUMBAR RADICULOPATHY: ICD-10-CM

## 2018-09-04 DIAGNOSIS — M47.816 FACET ARTHROPATHY, LUMBAR: ICD-10-CM

## 2018-09-04 PROCEDURE — 99999 PR PBB SHADOW E&M-EST. PATIENT-LVL IV: CPT | Mod: PBBFAC,,, | Performed by: NURSE PRACTITIONER

## 2018-09-04 PROCEDURE — 99214 OFFICE O/P EST MOD 30 MIN: CPT | Mod: PBBFAC,PO | Performed by: NURSE PRACTITIONER

## 2018-09-04 PROCEDURE — 99214 OFFICE O/P EST MOD 30 MIN: CPT | Mod: S$PBB,,, | Performed by: NURSE PRACTITIONER
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
## 2018-09-04 NOTE — LETTER
September 4, 2018      Jasbir Haney MD  [address] [facility] [address]           [place]  [address]  [place] [address]  Phone: [phone]          Patient: Mariann Huff   MR Number: [id number]   YOB: 1960   Date of Visit: 9/4/2018       Dear Dr. Jasbir Haney:    Thank you for referring Mariann Huff to me for evaluation. Attached you will find relevant portions of my assessment and plan of care.    If you have questions, please do not hesitate to call me. I look forward to following Mariann Huff along with you.    Sincerely,    Diallo Kruse, [facility]    Enclosure  CC:  No Recipients    If you would like to receive this communication electronically, please contact [email or web address] or [phone] to request more information on [employer] access.    For providers and/or their staff who would like to refer a patient to [facility], please contact us through our one-stop-shop provider referral line, Esmer Campbell, at [phone].    If you feel you have received this communication in error or would no longer like to receive these types of communications, please e-mail [email or web address]
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
## 2018-09-05 ENCOUNTER — TELEPHONE (OUTPATIENT)
Dept: PAIN MEDICINE | Facility: CLINIC | Age: 58
End: 2018-09-05

## 2018-09-05 DIAGNOSIS — M54.16 LUMBAR RADICULOPATHY: Primary | ICD-10-CM

## 2018-09-05 NOTE — TELEPHONE ENCOUNTER
----- Message from Adelaide Aguilar MD sent at 9/5/2018 12:16 PM CDT -----  Patient can hold Effient for 7 days prior to the procedure and restart ASAP  ----- Message -----  From: Ly Garay MA  Sent: 9/5/2018   8:19 AM  To: Adelaide Aguilar MD, #    Pt is scheduled to have a Bilateral TFESI on 9/25/18 with Dr. Abreu and she needs med clearance to hold Effient 7 days prior to procedure. Please Advise.      Thanks,  Ly Garay  Pain Management

## 2018-09-05 NOTE — TELEPHONE ENCOUNTER
Spoke with pt regarding her med clearance from Dr. Aguilar. I informed pt that Dr. Aguilar okayed her to hold Effient 7 days prior to her procedure with Dr. Abreu on 8/25/18 and to resume after. Pt verbalized understanding.

## 2018-09-07 ENCOUNTER — TELEPHONE (OUTPATIENT)
Dept: CARDIOLOGY | Facility: CLINIC | Age: 58
End: 2018-09-07

## 2018-09-07 DIAGNOSIS — I15.2 HYPERTENSION ASSOCIATED WITH DIABETES: ICD-10-CM

## 2018-09-07 DIAGNOSIS — E11.51 TYPE 2 DIABETES MELLITUS WITH DIABETIC PERIPHERAL ANGIOPATHY WITHOUT GANGRENE, WITH LONG-TERM CURRENT USE OF INSULIN: Primary | ICD-10-CM

## 2018-09-07 DIAGNOSIS — Z79.4 TYPE 2 DIABETES MELLITUS WITH DIABETIC PERIPHERAL ANGIOPATHY WITHOUT GANGRENE, WITH LONG-TERM CURRENT USE OF INSULIN: Primary | ICD-10-CM

## 2018-09-07 DIAGNOSIS — I25.10 CORONARY ARTERY DISEASE DUE TO LIPID RICH PLAQUE: ICD-10-CM

## 2018-09-07 DIAGNOSIS — I25.83 CORONARY ARTERY DISEASE DUE TO LIPID RICH PLAQUE: ICD-10-CM

## 2018-09-07 DIAGNOSIS — E11.59 HYPERTENSION ASSOCIATED WITH DIABETES: ICD-10-CM

## 2018-09-07 DIAGNOSIS — E78.2 MIXED HYPERLIPIDEMIA: ICD-10-CM

## 2018-09-09 DIAGNOSIS — I10 HTN (HYPERTENSION), BENIGN: Chronic | ICD-10-CM

## 2018-09-10 RX ORDER — LOSARTAN POTASSIUM 100 MG/1
100 TABLET ORAL DAILY
Qty: 30 TABLET | Refills: 8 | Status: SHIPPED | OUTPATIENT
Start: 2018-09-10 | End: 2018-11-07

## 2018-09-14 RX ORDER — FENOFIBRATE 134 MG/1
CAPSULE ORAL
Qty: 30 CAPSULE | Refills: 3 | Status: SHIPPED | OUTPATIENT
Start: 2018-09-14 | End: 2018-12-07 | Stop reason: SDUPTHER

## 2018-09-24 ENCOUNTER — TELEPHONE (OUTPATIENT)
Dept: PAIN MEDICINE | Facility: HOSPITAL | Age: 58
End: 2018-09-24

## 2018-09-24 NOTE — DISCHARGE INSTRUCTIONS
Home Care Instructions Pain Management:    1.  DIET:    You may resume your normal diet today.    2.  BATHING:    You may shower with luke warm water.    3.  DRESSING:    You may remove your bandage today.    4.  ACTIVITY LEVEL:      You may resume your normal activities 24 hours after your procedure.    5.  MEDICATIONS:    You may resume your normal medications today.    6.  SPECIAL INSTRUCTIONS:    No heat to the injection site for 24 hours including bath or shower, heating pad, moist heat or hot tubs.    Use an ice pack to the injection site for any pain or discomfort.  Apply ice packs for 20 minute intervals as needed.    If you have received any sedatives by mouth today, you can not drive for 12 hours.    If you have received sedation through an IV, you can not drive for 24 hours.    PLEASE CALL YOUR DOCTOR FOR THE FOLLOWIN.  Redness or swelling around the injection site.  2.  Fever of 101 degrees.  3.  Drainage (pus) from the injection site.  4.  For any continuous bleeding (some dried blood over the incision is normal.)    FOR EMERGENCIES:    If any unusual problems or difficulties occur during clinic hours, call [phone] or dial [phone].    Follow up with with your physician in 2-3 weeks.

## 2018-09-24 NOTE — TELEPHONE ENCOUNTER
Spoke with pt and given arrival time of 1230. Instructed to remain NPO after MN also remain off of 81 mg aspirin and other anticoagulants. Pt will have someone to drive her to and from hospital

## 2018-09-25 ENCOUNTER — HOSPITAL ENCOUNTER (OUTPATIENT)
Facility: HOSPITAL | Age: 58
Discharge: HOME | End: 2018-09-25
Attending: ANESTHESIOLOGY | Admitting: ANESTHESIOLOGY
Payer: MEDICARE

## 2018-09-25 VITALS
OXYGEN SATURATION: 100 % | DIASTOLIC BLOOD PRESSURE: 67 MMHG | RESPIRATION RATE: 16 BRPM | WEIGHT: 160 LBS | HEIGHT: 64 IN | BODY MASS INDEX: 27.31 KG/M2 | HEART RATE: 76 BPM | SYSTOLIC BLOOD PRESSURE: 144 MMHG | TEMPERATURE: 98 F

## 2018-09-25 DIAGNOSIS — M51.37 DDD (DEGENERATIVE DISC DISEASE), LUMBOSACRAL: Primary | ICD-10-CM

## 2018-09-25 DIAGNOSIS — M54.17 LUMBOSACRAL RADICULOPATHY: ICD-10-CM

## 2018-09-25 DIAGNOSIS — G89.29 CHRONIC PAIN: ICD-10-CM

## 2018-09-25 LAB — POCT GLUCOSE: 106 MG/DL (ref 70–110)

## 2018-09-25 PROCEDURE — 64483 NJX AA&/STRD TFRM EPI L/S 1: CPT | Mod: 50 | Performed by: ANESTHESIOLOGY

## 2018-09-25 PROCEDURE — 25000003 PHARM REV CODE 250: Performed by: ANESTHESIOLOGY

## 2018-09-25 PROCEDURE — 25500020 PHARM REV CODE 255: Performed by: ANESTHESIOLOGY

## 2018-09-25 PROCEDURE — 99152 MOD SED SAME PHYS/QHP 5/>YRS: CPT | Mod: ,,, | Performed by: ANESTHESIOLOGY

## 2018-09-25 PROCEDURE — 63600175 PHARM REV CODE 636 W HCPCS: Performed by: ANESTHESIOLOGY

## 2018-09-25 PROCEDURE — 64483 NJX AA&/STRD TFRM EPI L/S 1: CPT | Mod: 50,,, | Performed by: ANESTHESIOLOGY

## 2018-09-25 PROCEDURE — 25000003 PHARM REV CODE 250: Performed by: STUDENT IN AN ORGANIZED HEALTH CARE EDUCATION/TRAINING PROGRAM

## 2018-09-25 RX ORDER — FENTANYL CITRATE 50 UG/ML
INJECTION, SOLUTION INTRAMUSCULAR; INTRAVENOUS
Status: DISCONTINUED | OUTPATIENT
Start: 2018-09-25 | End: 2018-09-25 | Stop reason: HOSPADM

## 2018-09-25 RX ORDER — SODIUM CHLORIDE 9 MG/ML
500 INJECTION, SOLUTION INTRAVENOUS CONTINUOUS
Status: DISCONTINUED | OUTPATIENT
Start: 2018-09-25 | End: 2018-09-25 | Stop reason: HOSPADM

## 2018-09-25 RX ORDER — DEXAMETHASONE SODIUM PHOSPHATE 10 MG/ML
INJECTION INTRAMUSCULAR; INTRAVENOUS
Status: DISCONTINUED | OUTPATIENT
Start: 2018-09-25 | End: 2018-09-25 | Stop reason: HOSPADM

## 2018-09-25 RX ORDER — LIDOCAINE HYDROCHLORIDE 20 MG/ML
INJECTION, SOLUTION EPIDURAL; INFILTRATION; INTRACAUDAL; PERINEURAL
Status: DISCONTINUED | OUTPATIENT
Start: 2018-09-25 | End: 2018-09-25 | Stop reason: HOSPADM

## 2018-09-25 RX ORDER — MIDAZOLAM HYDROCHLORIDE 1 MG/ML
INJECTION INTRAMUSCULAR; INTRAVENOUS
Status: DISCONTINUED | OUTPATIENT
Start: 2018-09-25 | End: 2018-09-25 | Stop reason: HOSPADM

## 2018-09-25 RX ADMIN — SODIUM CHLORIDE 500 ML: 0.9 INJECTION, SOLUTION INTRAVENOUS at 01:09

## 2018-09-25 NOTE — DISCHARGE SUMMARY
"Discharge Note  Short Stay      SUMMARY     Admit Date: 9/25/2018    Attending Physician: Tl Abreu      Discharge Physician: Tl Abreu      Discharge Date: 9/25/2018 2:28 PM    Procedure(s) (LRB):  Injection,steroid,epidural,transforaminal approach - Bilateral - S1 (Bilateral)    Final Diagnosis: Lumbar radiculopathy [M54.16]    Disposition: Home or self care    Patient Instructions:   Current Discharge Medication List      CONTINUE these medications which have NOT CHANGED    Details   ACCU-CHEK EDIN PLUS METER Misc       albuterol 90 mcg/actuation inhaler Inhale 1-2 puffs into the lungs every 6 (six) hours as needed.  Qty: 1 each, Refills: 0      amLODIPine (NORVASC) 10 MG tablet TAKE 1 TABLET (10 MG TOTAL) BY MOUTH ONCE DAILY.  Qty: 30 tablet, Refills: 6    Associated Diagnoses: HTN (hypertension), benign      aspirin 81 mg Tab Take 81 mg by mouth. 1 Tablet Oral Every day      atorvastatin (LIPITOR) 40 MG tablet TAKE 1 TABLET (40 MG TOTAL) BY MOUTH ONCE DAILY.  Qty: 30 tablet, Refills: 11      BD INSULIN PEN NEEDLE UF MINI 31 x 3/16 " Ndle USE 4 TIMES A DAY  Qty: 200 each, Refills: 11      blood sugar diagnostic (ACCU-CHEK EDIN PLUS TEST STRP) Strp TEST BLOOD SUGARS 4 TIMES DAILY  Qty: 150 strip, Refills: 11    Associated Diagnoses: Type II or unspecified type diabetes mellitus with peripheral circulatory disorders, uncontrolled(250.72)      chlorthalidone (HYGROTEN) 25 MG Tab Take 1 tablet (25 mg total) by mouth once daily.  Qty: 30 tablet, Refills: 11    Associated Diagnoses: Hypertension associated with diabetes      cyclobenzaprine (FLEXERIL) 10 MG tablet TAKE 1 TABLET BY MOUTH 3 TIMES A DAY AS NEEDED FOR MUSCLE SPASMS. NO WORKING OR DRIVING ON THIS MED  Qty: 45 tablet, Refills: 5      dulaglutide (TRULICITY) 1.5 mg/0.5 mL PnIj Inject 1.5 mg into the skin every 7 days.  Qty: 4 Syringe, Refills: 6      esomeprazole (NEXIUM) 40 MG capsule Take 1 capsule (40 mg total) by mouth before " "breakfast.  Qty: 90 capsule, Refills: 3      fenofibrate micronized (LOFIBRA) 134 MG Cap TAKE 1 CAPSULE (134 MG TOTAL) BY MOUTH BEFORE BREAKFAST.  Qty: 30 capsule, Refills: 3      gabapentin (NEURONTIN) 100 MG capsule Take 2 capsules (200 mg total) by mouth every evening.  Qty: 60 capsule, Refills: 11      insulin glargine, TOUJEO, (TOUJEO SOLOSTAR) 300 unit/mL (1.5 mL) InPn pen Inject 50 Units into the skin 2 (two) times daily.  Qty: 6 Syringe, Refills: 11    Associated Diagnoses: Type 2 diabetes mellitus with diabetic peripheral angiopathy without gangrene, with long-term current use of insulin      INVOKANA 300 mg Tab tablet Take 1 tablet (300 mg total) by mouth once daily.  Qty: 30 tablet, Refills: 1    Associated Diagnoses: Type 2 diabetes mellitus with diabetic peripheral angiopathy without gangrene, without long-term current use of insulin      lancets 30 gauge Misc       losartan (COZAAR) 100 MG tablet TAKE 1 TABLET (100 MG TOTAL) BY MOUTH ONCE DAILY.  Qty: 30 tablet, Refills: 8    Associated Diagnoses: HTN (hypertension), benign      metoprolol succinate (TOPROL-XL) 100 MG 24 hr tablet TAKE 1 TABLET (100 MG TOTAL) BY MOUTH ONCE DAILY.  Qty: 30 tablet, Refills: 9      naproxen (NAPROSYN) 500 MG tablet Take 1 tablet (500 mg total) by mouth 2 (two) times daily.  Qty: 60 tablet, Refills: 1      nitroGLYCERIN (NITROSTAT) 0.4 MG SL tablet Take one every 2-3 min till chestpain relief for 3 times and if still no relief, call MD or come to ED  Qty: 30 tablet, Refills: 11      NOVOLOG FLEXPEN U-100 INSULIN 100 unit/mL InPn pen INJECT 30 U AM, 40 U AT NOON, 30 U PM.150-200 +2, 201-250 +4, 251-300 +6, 301-350 +8, >350 +10  Qty: 30 Syringe, Refills: 1      omega-3 acid ethyl esters (LOVAZA) 1 gram capsule Take 1 g by mouth once daily.       pen needle, diabetic (BD ULTRA-FINE GRABIEL PEN NEEDLES) 32 gauge x 5/32" Ndle For use with insulin pens daily 4 times a day  Qty: 100 each, Refills: 11      prasugrel (EFFIENT) 10 mg " "Tab TAKE 1 TABLET (10 MG TOTAL) BY MOUTH ONCE DAILY.  Qty: 30 tablet, Refills: 10      SURE COMFORT PEN NEEDLE 31 gauge x 5/16" Ndle       tramadol (ULTRAM) 50 mg tablet Take 1 tablet (50 mg total) by mouth 3 (three) times daily as needed for Pain.  Qty: 60 tablet, Refills: 1      zolpidem (AMBIEN) 5 MG Tab TAKE 1 TABLET BY MOUTH NIGHTLY AS NEEDED  Qty: 30 tablet, Refills: 2    Comments: Not to exceed 5 additional fills before 09/25/2018                 Discharge Diagnosis: Lumbar radiculopathy [M54.16]  Condition on Discharge: Stable with no complications to procedure   Diet on Discharge: Same as before.  Activity: as per instruction sheet.  Discharge to: Home with a responsible adult.  Follow up: 2-4 weeks        "

## 2018-09-25 NOTE — INTERVAL H&P NOTE
The patient has been examined and the H&P has been reviewed:    58 y oF with CAD, DMtype II, and lumbar radiculopathy who presents for bilateral L5-S1 TESI. Pt has been off anticoagulation for 8 days.     I concur with the findings and no changes have occurred since H&P was written.    Anesthesia/Surgery risks, benefits and alternative options discussed and understood by patient/family.          Active Hospital Problems    Diagnosis  POA    Chronic pain [G89.29]  Yes      Resolved Hospital Problems   No resolved problems to display.

## 2018-09-25 NOTE — OP NOTE
Patient Name: Mariann Huff  MRN: [id number]    INFORMED CONSENT: The procedure, risks, benefits and options were discussed with patient. There are no contraindications to the procedure. The patient expressed understanding and agreed to proceed. The personnel performing the procedure was discussed. I verify that I personally obtained Mariann's consent prior to the start of the procedure and the signed consent can be found on the patient's chart.    Procedure Date: 09/25/2018    Anesthesia: Topical    Pre Procedure diagnosis: Lumbar radiculopathy [M54.16]  1. DDD (degenerative disc disease), lumbosacral    2. Lumbosacral radiculopathy    3. Chronic pain      Post-Procedure diagnosis: SAME      Sedation: Yes - Fentanyl 100 mcg and Midazolam 2 mg    PROCEDURE:Bilateral S1   TRANSFORAMINAL EPIDURAL STEROID INJECTION        DESCRIPTION OF PROCEDURE: The patient was brought to the procedure room. After performing time out IV access was obtained prior to the procedure. The patient was positioned prone on the fluoroscopy table. Continuous hemodynamic monitoring was initiated including blood pressure, EKG, and pulse oximetry. . The skin was prepped with chlorhexidine three times and draped in a sterile fashion. Skin anesthesia was achieved using 3 mL of lidocaine 1% over the respective injection site.     An oblique fluoroscopic view was obtained, with the superior articular process of the inferior vertebral body aligned with the pedicle. The tip of a 25-gauge 3.5-inch Quincke-type spinal needle was advanced toward the 6 oclock position of the pedicle under intermittent fluoroscopic guidance. Confirmation of proper needle position was made with AP, oblique, and lateral fluoroscopic views. Negative aspiration for blood or CSF was confirmed. 2 mL of Omnipaque 300 was injected. Live fluoroscopic imaging revealed a clear outline of the spinal nerve with proximal spread of agent through the neural foramen into the anterior  epidural space. A total combination of 3 mL of Lidocaine 0.5% and 10 mg decadron was injected at each level. Contrast spread was noted from L5 to S1 level. There was no pain on injection. The needle was removed and bleeding was nil.  A sterile dressing was applied. Orlester was taken back to the recovery room for further observation.     Blood Loss: Nill  Specimen: None    Tl Abreu MD

## 2018-09-28 ENCOUNTER — TELEPHONE (OUTPATIENT)
Dept: PAIN MEDICINE | Facility: HOSPITAL | Age: 58
End: 2018-09-28

## 2018-10-01 ENCOUNTER — LAB VISIT (OUTPATIENT)
Dept: LAB | Facility: HOSPITAL | Age: 58
End: 2018-10-01
Attending: INTERNAL MEDICINE
Payer: MEDICARE

## 2018-10-01 ENCOUNTER — OFFICE VISIT (OUTPATIENT)
Dept: INTERNAL MEDICINE | Facility: CLINIC | Age: 58
End: 2018-10-01
Payer: MEDICARE

## 2018-10-01 VITALS
RESPIRATION RATE: 15 BRPM | TEMPERATURE: 99 F | HEART RATE: 75 BPM | HEIGHT: 64 IN | BODY MASS INDEX: 27.55 KG/M2 | DIASTOLIC BLOOD PRESSURE: 86 MMHG | SYSTOLIC BLOOD PRESSURE: 180 MMHG | WEIGHT: 161.38 LBS

## 2018-10-01 DIAGNOSIS — R10.829 REBOUND ABDOMINAL TENDERNESS: ICD-10-CM

## 2018-10-01 DIAGNOSIS — R10.84 GENERALIZED ABDOMINAL PAIN: ICD-10-CM

## 2018-10-01 DIAGNOSIS — R10.84 GENERALIZED ABDOMINAL PAIN: Primary | ICD-10-CM

## 2018-10-01 LAB
ALBUMIN SERPL BCP-MCNC: 3.8 G/DL
ALP SERPL-CCNC: 72 U/L
ALT SERPL W/O P-5'-P-CCNC: 16 U/L
ANION GAP SERPL CALC-SCNC: 10 MMOL/L
AST SERPL-CCNC: 14 U/L
BASOPHILS # BLD AUTO: 0.02 K/UL
BASOPHILS NFR BLD: 0.3 %
BILIRUB SERPL-MCNC: 0.5 MG/DL
BUN SERPL-MCNC: 16 MG/DL
CALCIUM SERPL-MCNC: 10.2 MG/DL
CHLORIDE SERPL-SCNC: 110 MMOL/L
CO2 SERPL-SCNC: 26 MMOL/L
CREAT SERPL-MCNC: 0.9 MG/DL
DIFFERENTIAL METHOD: ABNORMAL
EOSINOPHIL # BLD AUTO: 0.6 K/UL
EOSINOPHIL NFR BLD: 8.1 %
ERYTHROCYTE [DISTWIDTH] IN BLOOD BY AUTOMATED COUNT: 13.4 %
EST. GFR  (AFRICAN AMERICAN): >60 ML/MIN/1.73 M^2
EST. GFR  (NON AFRICAN AMERICAN): >60 ML/MIN/1.73 M^2
GLUCOSE SERPL-MCNC: 146 MG/DL
HCT VFR BLD AUTO: 37.4 %
HGB BLD-MCNC: 11.8 G/DL
IMM GRANULOCYTES # BLD AUTO: 0.02 K/UL
IMM GRANULOCYTES NFR BLD AUTO: 0.3 %
LYMPHOCYTES # BLD AUTO: 2.5 K/UL
LYMPHOCYTES NFR BLD: 35.1 %
MCH RBC QN AUTO: 26.7 PG
MCHC RBC AUTO-ENTMCNC: 31.6 G/DL
MCV RBC AUTO: 85 FL
MONOCYTES # BLD AUTO: 0.6 K/UL
MONOCYTES NFR BLD: 8.8 %
NEUTROPHILS # BLD AUTO: 3.3 K/UL
NEUTROPHILS NFR BLD: 47.4 %
NRBC BLD-RTO: 0 /100 WBC
PLATELET # BLD AUTO: 306 K/UL
PMV BLD AUTO: 11.1 FL
POTASSIUM SERPL-SCNC: 4 MMOL/L
PROT SERPL-MCNC: 7.5 G/DL
RBC # BLD AUTO: 4.42 M/UL
SODIUM SERPL-SCNC: 146 MMOL/L
WBC # BLD AUTO: 7.03 K/UL

## 2018-10-01 PROCEDURE — 80053 COMPREHEN METABOLIC PANEL: CPT

## 2018-10-01 PROCEDURE — 3079F DIAST BP 80-89 MM HG: CPT | Mod: CPTII,,, | Performed by: INTERNAL MEDICINE

## 2018-10-01 PROCEDURE — 99999 PR PBB SHADOW E&M-EST. PATIENT-LVL V: CPT | Mod: PBBFAC,,, | Performed by: INTERNAL MEDICINE

## 2018-10-01 PROCEDURE — 99214 OFFICE O/P EST MOD 30 MIN: CPT | Mod: S$PBB,,, | Performed by: INTERNAL MEDICINE

## 2018-10-01 PROCEDURE — 3008F BODY MASS INDEX DOCD: CPT | Mod: CPTII,,, | Performed by: INTERNAL MEDICINE

## 2018-10-01 PROCEDURE — 99215 OFFICE O/P EST HI 40 MIN: CPT | Mod: PBBFAC,PO | Performed by: INTERNAL MEDICINE

## 2018-10-01 PROCEDURE — 85025 COMPLETE CBC W/AUTO DIFF WBC: CPT

## 2018-10-01 PROCEDURE — 36415 COLL VENOUS BLD VENIPUNCTURE: CPT | Mod: PO

## 2018-10-01 PROCEDURE — 3077F SYST BP >= 140 MM HG: CPT | Mod: CPTII,,, | Performed by: INTERNAL MEDICINE

## 2018-10-01 RX ORDER — METRONIDAZOLE 500 MG/1
500 TABLET ORAL 3 TIMES DAILY
Qty: 30 TABLET | Refills: 0 | Status: SHIPPED | OUTPATIENT
Start: 2018-10-01 | End: 2018-10-11

## 2018-10-01 RX ORDER — CIPROFLOXACIN 500 MG/1
500 TABLET ORAL EVERY 12 HOURS
Qty: 20 TABLET | Refills: 0 | Status: SHIPPED | OUTPATIENT
Start: 2018-10-01 | End: 2018-10-11

## 2018-10-01 NOTE — PROGRESS NOTES
Subjective:       Patient ID: Mariann Huff is a 58 y.o. female.    Chief Complaint: Abdominal Pain; Nausea (vomiting); and Diarrhea    HPI     58-year-old female here for evaluation of abdominal pain, nausea, vomiting, and diarrhea.  She has been feeling badly since last week. Her stomach has been turning in knots.  She had vomiting at the time.  She is not currently vomiting.  The diarrhea stopped Saturday.  The abdominal pain is in the middle of her abdomen.  It is a sharp pain.  No radiation of pain.  She had fevers and chills Thursday last week.  No similar issues around her.  No sick contacts.    Review of Systems    Objective:      Physical Exam   Constitutional: She is oriented to person, place, and time. She appears well-developed and well-nourished.   HENT:   Head: Normocephalic and atraumatic.   Mouth/Throat: No oropharyngeal exudate.   Eyes: EOM are normal. Pupils are equal, round, and reactive to light. Right eye exhibits no discharge. Left eye exhibits no discharge. No scleral icterus.   Neck: Normal range of motion. Neck supple. No tracheal deviation present. No thyromegaly present.   Cardiovascular: Normal rate, regular rhythm and normal heart sounds. Exam reveals no gallop and no friction rub.   No murmur heard.  Pulmonary/Chest: Effort normal and breath sounds normal. No respiratory distress. She has no wheezes. She has no rales. She exhibits no tenderness.   Abdominal: Soft. Bowel sounds are normal. She exhibits no distension and no mass. There is generalized tenderness. There is rebound. There is no guarding.   Musculoskeletal: Normal range of motion. She exhibits no edema or tenderness.   Neurological: She is alert and oriented to person, place, and time.   Skin: Skin is warm and dry. No rash noted. No erythema. No pallor.   Psychiatric: She has a normal mood and affect. Her behavior is normal.   Vitals reviewed.      Assessment:       1. Generalized abdominal pain    2. Rebound abdominal  tenderness        Plan:       1/2.  Cipro 500 mg b.i.d. times 10 days, Flagyl 500 mg 3 times a day times 10 days.  Check CT scan abdomen and pelvis with contrast.  Check CBC, CMP.

## 2018-10-03 ENCOUNTER — TELEPHONE (OUTPATIENT)
Dept: INTERNAL MEDICINE | Facility: CLINIC | Age: 58
End: 2018-10-03

## 2018-10-03 NOTE — TELEPHONE ENCOUNTER
----- Message from Cynthia Alexandra sent at 10/3/2018 11:33 AM CDT -----  Just to make you aware.  Dr Jasbir Haney had entered a referral for patient to have a STAT CT.  Patient was scheduled then rescheduled then Canceled.    Generalized abdominal pain [R10.84]  Rebound abdominal tenderness [R10.829    Thanks, Dorota

## 2018-10-03 NOTE — TELEPHONE ENCOUNTER
Called patient.  No answer.  Message left for patient to contact the office.  Needs to reschedule CT

## 2018-10-22 ENCOUNTER — LAB VISIT (OUTPATIENT)
Dept: LAB | Facility: HOSPITAL | Age: 58
End: 2018-10-22
Attending: INTERNAL MEDICINE
Payer: MEDICARE

## 2018-10-22 DIAGNOSIS — E11.51 TYPE 2 DIABETES MELLITUS WITH DIABETIC PERIPHERAL ANGIOPATHY WITHOUT GANGRENE, WITH LONG-TERM CURRENT USE OF INSULIN: Chronic | ICD-10-CM

## 2018-10-22 DIAGNOSIS — I15.2 HYPERTENSION ASSOCIATED WITH DIABETES: ICD-10-CM

## 2018-10-22 DIAGNOSIS — I25.83 CORONARY ARTERY DISEASE DUE TO LIPID RICH PLAQUE: ICD-10-CM

## 2018-10-22 DIAGNOSIS — I25.10 CORONARY ARTERY DISEASE DUE TO LIPID RICH PLAQUE: ICD-10-CM

## 2018-10-22 DIAGNOSIS — E78.2 MIXED HYPERLIPIDEMIA: ICD-10-CM

## 2018-10-22 DIAGNOSIS — E11.59 HYPERTENSION ASSOCIATED WITH DIABETES: ICD-10-CM

## 2018-10-22 DIAGNOSIS — Z79.4 TYPE 2 DIABETES MELLITUS WITH DIABETIC PERIPHERAL ANGIOPATHY WITHOUT GANGRENE, WITH LONG-TERM CURRENT USE OF INSULIN: Chronic | ICD-10-CM

## 2018-10-22 LAB
ALBUMIN SERPL BCP-MCNC: 3.8 G/DL
ALP SERPL-CCNC: 78 U/L
ALT SERPL W/O P-5'-P-CCNC: 17 U/L
ANION GAP SERPL CALC-SCNC: 9 MMOL/L
AST SERPL-CCNC: 20 U/L
BILIRUB SERPL-MCNC: 0.5 MG/DL
BUN SERPL-MCNC: 12 MG/DL
CALCIUM SERPL-MCNC: 10.2 MG/DL
CHLORIDE SERPL-SCNC: 107 MMOL/L
CHOLEST SERPL-MCNC: 268 MG/DL
CHOLEST/HDLC SERPL: 6.1 {RATIO}
CO2 SERPL-SCNC: 27 MMOL/L
CREAT SERPL-MCNC: 1 MG/DL
EST. GFR  (AFRICAN AMERICAN): >60 ML/MIN/1.73 M^2
EST. GFR  (NON AFRICAN AMERICAN): >60 ML/MIN/1.73 M^2
ESTIMATED AVG GLUCOSE: 197 MG/DL
GLUCOSE SERPL-MCNC: 156 MG/DL
HBA1C MFR BLD HPLC: 8.5 %
HDLC SERPL-MCNC: 44 MG/DL
HDLC SERPL: 16.4 %
LDLC SERPL CALC-MCNC: 193.6 MG/DL
NONHDLC SERPL-MCNC: 224 MG/DL
POTASSIUM SERPL-SCNC: 3.8 MMOL/L
PROT SERPL-MCNC: 7.7 G/DL
SODIUM SERPL-SCNC: 143 MMOL/L
TRIGL SERPL-MCNC: 152 MG/DL
TSH SERPL DL<=0.005 MIU/L-ACNC: 1.17 UIU/ML

## 2018-10-22 PROCEDURE — 83036 HEMOGLOBIN GLYCOSYLATED A1C: CPT

## 2018-10-22 PROCEDURE — 36415 COLL VENOUS BLD VENIPUNCTURE: CPT | Mod: PO

## 2018-10-22 PROCEDURE — 84443 ASSAY THYROID STIM HORMONE: CPT

## 2018-10-22 PROCEDURE — 80053 COMPREHEN METABOLIC PANEL: CPT

## 2018-10-22 PROCEDURE — 80061 LIPID PANEL: CPT

## 2018-10-23 ENCOUNTER — TELEPHONE (OUTPATIENT)
Dept: CARDIOLOGY | Facility: CLINIC | Age: 58
End: 2018-10-23

## 2018-10-23 NOTE — TELEPHONE ENCOUNTER
----- Message from Adelaide Aguilar MD sent at 10/23/2018  1:23 PM CDT -----  Please mail patient the blood work results and inform the patient that we will discuss it in detail during the upcoming visit. Lipids are worse again, but Diabetes is stable although not at goal

## 2018-10-24 ENCOUNTER — OFFICE VISIT (OUTPATIENT)
Dept: PAIN MEDICINE | Facility: CLINIC | Age: 58
End: 2018-10-24
Payer: MEDICARE

## 2018-10-24 VITALS
SYSTOLIC BLOOD PRESSURE: 146 MMHG | BODY MASS INDEX: 27.81 KG/M2 | DIASTOLIC BLOOD PRESSURE: 78 MMHG | WEIGHT: 162.06 LBS | HEART RATE: 69 BPM

## 2018-10-24 DIAGNOSIS — M54.16 LUMBAR RADICULOPATHY: ICD-10-CM

## 2018-10-24 DIAGNOSIS — G89.4 CHRONIC PAIN SYNDROME: ICD-10-CM

## 2018-10-24 DIAGNOSIS — M51.37 DDD (DEGENERATIVE DISC DISEASE), LUMBOSACRAL: Primary | ICD-10-CM

## 2018-10-24 DIAGNOSIS — M51.26 HERNIATED LUMBAR INTERVERTEBRAL DISC: ICD-10-CM

## 2018-10-24 DIAGNOSIS — M54.17 LUMBOSACRAL RADICULOPATHY: ICD-10-CM

## 2018-10-24 PROCEDURE — 99999 PR PBB SHADOW E&M-EST. PATIENT-LVL IV: CPT | Mod: PBBFAC,,, | Performed by: NURSE PRACTITIONER

## 2018-10-24 PROCEDURE — 99213 OFFICE O/P EST LOW 20 MIN: CPT | Mod: S$PBB,,, | Performed by: NURSE PRACTITIONER

## 2018-10-24 PROCEDURE — 99214 OFFICE O/P EST MOD 30 MIN: CPT | Mod: PBBFAC,PO | Performed by: NURSE PRACTITIONER

## 2018-10-24 NOTE — PROGRESS NOTES
Chronic patient Established Note (Follow up visit)      SUBJECTIVE:    Mariann Huff presents to the clinic for a follow-up appointment for low back pain and bilateral leg pain stopping at her calf's bilaterally. She is S/P Bilateral [id number] EPIDURAL STEROID INJECTION on 18 with 0% relief.  MRI shows L5-S1 demonstrates a moderate central disc herniation that abuts both right and left descending S1 nerve roots and compresses them against the adjacent facet.  There is also bilateral neural foraminal narrowing.  Discussed that I will schedule her for a caudal PAULO with catheter, discussed that she does not getting relief that I will refer her to Neurosurgery.  Since the last visit, Mariann Huff states the pain has been persistant. Current pain intensity is 8/10.    Pain Disability Index Review:  Last 3 PDI Scores 10/24/2018 2016   Pain Disability Index (PDI) 53 64       Pain Medications:     - Opioids: Ultram (Tramadol HCL)  - Adjuvant Medications: Ambien (Zolpidem), Neurontin (Gabapentin), Flexeril and Naproxen   - Anti-Coagulants: Aspirin  - Others: see medication list     Opioid Contract: no      report:  Reviewed and consistent with medication use as prescribed.     Pain Procedures: 18 Bilateral [id number] EPIDURAL STEROID INJECTION  17 Facet Injections @ L4-L5 and L5-S1   2/10/17 LUMBAR L4-L5 AND L5-S1 FACET STEROID INJECTION      Physical Therapy/Home Exercise: no     Imagin17 X-Ray Lumbar Spine Ap And Lateral     Narrative       Lumbar spine radiographs    Images: 3    Comparison: 16    Findings:    Alignment: There is minimal retrolisthesis at L5-S1.    Vertebrae: Vertebral body heights are maintained.  No suspicious lytic or blastic lesions.  Discs and facets: Severe disc height loss noted at L5-S1.  Lower lumbar facet arthropathy noted.  Soft tissues: Vascular calcification noted.                 Allergies: Review of patient's allergies indicates:  No  "Known Allergies    Current Medications:   Current Outpatient Medications   Medication Sig Dispense Refill    ACCU-CHEK EDIN PLUS METER Misc       albuterol 90 mcg/actuation inhaler Inhale 1-2 puffs into the lungs every 6 (six) hours as needed. 1 each 0    amLODIPine (NORVASC) 10 MG tablet TAKE 1 TABLET (10 MG TOTAL) BY MOUTH ONCE DAILY. 30 tablet 6    aspirin 81 mg Tab Take 81 mg by mouth. 1 Tablet Oral Every day      atorvastatin (LIPITOR) 40 MG tablet TAKE 1 TABLET (40 MG TOTAL) BY MOUTH ONCE DAILY. 30 tablet 11    BD INSULIN PEN NEEDLE UF MINI 31 x 3/16 " Ndle USE 4 TIMES A  each 11    blood sugar diagnostic (ACCU-CHEK EDIN PLUS TEST STRP) Strp TEST BLOOD SUGARS 4 TIMES DAILY 150 strip 11    chlorthalidone (HYGROTEN) 25 MG Tab Take 1 tablet (25 mg total) by mouth once daily. 30 tablet 11    cyclobenzaprine (FLEXERIL) 10 MG tablet TAKE 1 TABLET BY MOUTH 3 TIMES A DAY AS NEEDED FOR MUSCLE SPASMS. NO WORKING OR DRIVING ON THIS MED 45 tablet 5    dulaglutide (TRULICITY) 1.5 mg/0.5 mL PnIj Inject 1.5 mg into the skin every 7 days. 4 Syringe 6    esomeprazole (NEXIUM) 40 MG capsule Take 1 capsule (40 mg total) by mouth before breakfast. 90 capsule 3    fenofibrate micronized (LOFIBRA) 134 MG Cap TAKE 1 CAPSULE (134 MG TOTAL) BY MOUTH BEFORE BREAKFAST. 30 capsule 3    gabapentin (NEURONTIN) 100 MG capsule Take 2 capsules (200 mg total) by mouth every evening. 60 capsule 11    insulin glargine, TOUJEO, (TOUJEO SOLOSTAR) 300 unit/mL (1.5 mL) InPn pen Inject 50 Units into the skin 2 (two) times daily. (Patient taking differently: Inject 40 Units into the skin 2 (two) times daily. ) 6 Syringe 11    INVOKANA 300 mg Tab tablet Take 1 tablet (300 mg total) by mouth once daily. 30 tablet 1    lancets 30 gauge Misc       losartan (COZAAR) 100 MG tablet TAKE 1 TABLET (100 MG TOTAL) BY MOUTH ONCE DAILY. 30 tablet 8    metoprolol succinate (TOPROL-XL) 100 MG 24 hr tablet TAKE 1 TABLET (100 MG TOTAL) BY " "MOUTH ONCE DAILY. 30 tablet 9    nitroGLYCERIN (NITROSTAT) 0.4 MG SL tablet Take one every 2-3 min till chestpain relief for 3 times and if still no relief, call MD or come to ED 30 tablet 11    NOVOLOG FLEXPEN U-100 INSULIN 100 unit/mL InPn pen INJECT 30 U AM, 40 U AT NOON, 30 U PM.150-200 +2, 201-250 +4, 251-300 +6, 301-350 +8, >350 +10 30 Syringe 1    omega-3 acid ethyl esters (LOVAZA) 1 gram capsule Take 1 g by mouth once daily.       pen needle, diabetic (BD ULTRA-FINE GRABIEL PEN NEEDLES) 32 gauge x 5/32" Ndle For use with insulin pens daily 4 times a day 100 each 11    prasugrel (EFFIENT) 10 mg Tab TAKE 1 TABLET (10 MG TOTAL) BY MOUTH ONCE DAILY. 30 tablet 10    SURE COMFORT PEN NEEDLE 31 gauge x 5/16" Ndle       tramadol (ULTRAM) 50 mg tablet Take 1 tablet (50 mg total) by mouth 3 (three) times daily as needed for Pain. 60 tablet 1    zolpidem (AMBIEN) 5 MG Tab TAKE 1 TABLET BY MOUTH NIGHTLY AS NEEDED 30 tablet 2     No current facility-administered medications for this visit.        REVIEW OF SYSTEMS:    GENERAL:  No weight loss, malaise or fevers.  HEENT:  Negative for frequent or significant headaches.  NECK:  Negative for lumps, goiter, pain and significant neck swelling.  RESPIRATORY:  Negative for cough, wheezing or shortness of breath.  CARDIOVASCULAR:  Negative for chest pain, leg swelling or palpitations.  GI:  Negative for abdominal discomfort, blood in stools or black stools or change in bowel habits.  MUSCULOSKELETAL:  See HPI.  SKIN:  Negative for lesions, rash, and itching.  PSYCH:  POSITIVE sleep disturbance, mood disorder and recent psychosocial stressors.  HEMATOLOGY/LYMPHOLOGY:  Negative for prolonged bleeding, bruising easily or swollen nodes.  NEURO:   No history of headaches, syncope, paralysis, seizures or tremors.  All other reviewed and negative other than HPI.       Past Medical History:  Past Medical History:   Diagnosis Date    Anticoagulant long-term use     Asthma     " Back pain     CAD (coronary artery disease)     s/p stentimg  (2), (1)    Carotid artery stenosis     Diabetes mellitus type 2 in obese     HTN (hypertension), benign     Hyperlipidemia     Keloid cicatrix     NPDR (nonproliferative diabetic retinopathy) 2015    NSTEMI (non-ST elevated myocardial infarction)     Nuclear sclerosis - Right Eye 3/18/2014    Primary localized osteoarthrosis, lower leg 2014    Sleep apnea        Past Surgical History:  Past Surgical History:   Procedure Laterality Date    CARDIAC CATHETERIZATION      cataract extraction left eye      cataracts      CATHETERIZATION, HEART, LEFT Left 2014    [facility] at [facility] CATH LAB     SECTION, LOW TRANSVERSE      CORONARY ANGIOPLASTY      ESOPHAGOGASTRODUODENOSCOPY (EGD) N/A 2016    Performed by Gardenia Adamson [facility] at [facility] ENDO (4TH FLR)    EXCISION TURBINATE, SUBMUCOUS      HAND SURGERY Left     HAND SURGERY Right     torn ligament repair/ Dr. Yeboah    HYSTERECTOMY      Injection,steroid,epidural,transforaminal approach - Bilateral - S1 Bilateral 2018    [facility] at [facility] PAIN MGT    INSERTION-INTRAOCULAR LENS (IOL) Right 2015    Performed by Good Domingo [facility] at [facility] OR 1ST FLR    left foot surgery      left wrist surgery      NASAL SEPTUM SURGERY  5/7/15    PHACOEMULSIFICATION-ASPIRATION-CATARACT Right 2015    [facility] at [facility] OR 1ST FLR    RESECTION-TURBINATES (SMR) N/A 2015    Performed by Dileep Dubois III, [facility] at [facility] OR 2ND FLR    rt elbow surgery      S/P LAD COATED STENT  2010    6 total     S/P OM1 STENT  2003    SEPTOPLASTY N/A 2015    Per[facility], [facility] at [facility] OR 2ND FLR    SINUS SURGERY      F.E.S.S.    SINUS SURGERY FUNCTIONAL ENDOSCOPIC WITH NAVIGATION WITH MAXILLARIES, ETHMOIDS, LEFT SPHENOID, LEFT LOLY N/A 2015     [facility]    TUBAL LIGATION         Family History:  Family History   Problem Relation Age of Onset    Diabetes Mother     Heart disease Mother     Diabetes Father     Leukemia Father         leukemia    Heart attack Father     Diabetes Sister     Diabetes Brother     Diabetes Sister     No Known Problems Sister     No Known Problems Brother     No Known Problems Brother     No Known Problems Maternal Grandmother     No Known Problems Maternal Grandfather     No Known Problems Paternal Grandmother     No Known Problems Paternal Grandfather     No Known Problems Son     No Known Problems Son     No Known Problems Maternal Aunt     No Known Problems Maternal Uncle     No Known Problems Paternal Aunt     No Known Problems Paternal Uncle     Colon cancer Neg Hx     Inflammatory bowel disease Neg Hx     Melanoma Neg Hx     Psoriasis Neg Hx     Lupus Neg Hx     Eczema Neg Hx     Acne Neg Hx     Amblyopia Neg Hx     Blindness Neg Hx     Cancer Neg Hx     Cataracts Neg Hx     Glaucoma Neg Hx     Hypertension Neg Hx     Macular degeneration Neg Hx     Retinal detachment Neg Hx     Strabismus Neg Hx     Stroke Neg Hx     Thyroid disease Neg Hx     Heart failure Neg Hx     Hyperlipidemia Neg Hx        Social History:  Social History     Socioeconomic History    Marital status:      Spouse name: Shamir    Number of children: 2    Years of education: None    Highest education level: None   Social Needs    Financial resource strain: None    Food insecurity - worry: None    Food insecurity - inability: None    Transportation needs - medical: None    Transportation needs - non-medical: None   Occupational History    Occupation: cafeteria     Employer: [employer]     Employer: [employer]     Employer: [employer]   Tobacco Use    Smoking status: Never Smoker    Smokeless tobacco: Never  Used   Substance and Sexual Activity    Alcohol use: No     Alcohol/week: 0.0 oz    Drug use: No    Sexual activity: Yes     Partners: Male     Birth control/protection: Post-menopausal     Comment:    Other Topics Concern    Are you pregnant or think you may be? No    Breast-feeding No   Social History Narrative    . 2 children.        OBJECTIVE:    BP (!) 146/78   Pulse 69   Wt 73.5 kg (162 lb 0.6 oz)   BMI 27.81 kg/m²     PHYSICAL EXAMINATION:    General appearance: Well appearing, in no acute distress, alert and oriented x3.  Psych:  Mood and affect appropriate.  Skin: Skin color, texture, turgor normal, no rashes or lesions, in both upper and lower body.  Head/face:  Atraumatic, normocephalic. No palpable lymph nodes  Neck: No pain to palpation over the cervical paraspinous muscles. Spurling Negative. No pain with neck flexion, extension, or lateral flexion. .  Cor: RRR  Pulm: CTA  GI: Abdomen soft and non-tender.  Back: Straight leg raising in the sitting and supine positions is + to radicular pain bilaterally  +  pain to palpation over the L-spine or costovertebral angles. Normal range of motion without pain reproduction. + Facet Loading Bilaterally. Positive FABERE, Yeoman's and Galensen test on the both side.  Extremities: Peripheral joint ROM is full and pain free without obvious instability or laxity in all four extremities. No deformities, edema, or skin discoloration. Good capillary refill.  Musculoskeletal: Shoulder, hip, sacroiliac and knee provocative maneuvers are negative. Bilateral upper and lower extremity strength is normal and symmetric.  No atrophy or tone abnormalities are noted.  Neuro: Bilateral upper and lower extremity coordination and muscle stretch reflexes are physiologic and symmetric.  Plantar response are downgoing. No loss of sensation is noted.  Gait:  Antalgic    ASSESSMENT: 58 y.o. year old female with low back and bilateral leg  pain, consistent with       Diagnosis:    1. DDD (degenerative disc disease), lumbosacral     2. Lumbosacral radiculopathy     3. Chronic pain syndrome     4. Herniated lumbar intervertebral disc     5. Lumbar radiculopathy           PLAN:     - I have stressed the importance of physical activity and a home exercise plan to help with pain and improve health.  - Patient can continue with medications for now since they are providing benefits, using them appropriately, and without side effects.  - Counseled patient regarding the importance of activity modification, constant sleeping habits and physical therapy.  - scheduled for caudal PAULO with Racz catheter  - I have explained the risks, benefits, and alternatives of the procedure in detail. The patient voices understanding and all questions have been answered. The patient agrees to proceed as planned. Written Consent obtained.   -referring neural surgery if injection does not provide any relief  -RTC 2-3 weeks following procedure.  -The above plan and management options were discussed at length with patient. Patient is in agreement with the above and verbalized understanding. Dr. Abreu   was consulted on this patient  and agrees with this plan.    ANJALI Vela  Interventional Pain Management      10/24/2018     Disclaimer: This note was partly generated using dictation software which may occasionally result in transcription errors.

## 2018-10-25 ENCOUNTER — TELEPHONE (OUTPATIENT)
Dept: PAIN MEDICINE | Facility: CLINIC | Age: 58
End: 2018-10-25

## 2018-10-25 DIAGNOSIS — M54.16 LUMBAR RADICULOPATHY: Primary | ICD-10-CM

## 2018-10-25 NOTE — TELEPHONE ENCOUNTER
Spoke with pt regarding her med clearance. I informed pt Dr. Aguilar okayed her to hold Effient 7 days prior and ASA 3 days prior to her procedure and to restart the day after. Pt verbalized understanding.

## 2018-10-25 NOTE — TELEPHONE ENCOUNTER
----- Message from Cynthia Smith MA sent at 10/25/2018 11:13 AM CDT -----      ----- Message -----  From: Adeliade Aguilar MD  Sent: 10/25/2018  10:46 AM  To: Cynthia Smith MA    Patient can hold Effient for 7 days prior to the procedure.  If necessary she can hold ASA as well and restart both ASAP  ----- Message -----  From: Cynthia Smith MA  Sent: 10/25/2018  10:29 AM  To: Adelaide Aguilar MD        ----- Message -----  From: Ly Garay MA  Sent: 10/25/2018  10:24 AM  To: Adelaide Aguilar MD, #    Pt is scheduled to have a Caudal Epidural Steroid Injection with Dr. Abreu on 11/13/18 and needs med clearance to hold Effient 7 to 10 days prior to her procedure. Please advise.

## 2018-10-26 ENCOUNTER — OFFICE VISIT (OUTPATIENT)
Dept: ENDOCRINOLOGY | Facility: CLINIC | Age: 58
End: 2018-10-26
Payer: MEDICARE

## 2018-10-26 VITALS
DIASTOLIC BLOOD PRESSURE: 72 MMHG | BODY MASS INDEX: 27.59 KG/M2 | HEIGHT: 64 IN | WEIGHT: 161.63 LBS | SYSTOLIC BLOOD PRESSURE: 138 MMHG | HEART RATE: 80 BPM | RESPIRATION RATE: 18 BRPM

## 2018-10-26 DIAGNOSIS — I25.10 CORONARY ARTERY DISEASE DUE TO LIPID RICH PLAQUE: Chronic | ICD-10-CM

## 2018-10-26 DIAGNOSIS — I15.2 HYPERTENSION ASSOCIATED WITH DIABETES: Chronic | ICD-10-CM

## 2018-10-26 DIAGNOSIS — K76.0 FATTY LIVER DISEASE, NONALCOHOLIC: ICD-10-CM

## 2018-10-26 DIAGNOSIS — E11.59 HYPERTENSION ASSOCIATED WITH DIABETES: Chronic | ICD-10-CM

## 2018-10-26 DIAGNOSIS — E78.2 MIXED HYPERLIPIDEMIA: Chronic | ICD-10-CM

## 2018-10-26 DIAGNOSIS — Z79.4 TYPE 2 DIABETES MELLITUS WITH DIABETIC PERIPHERAL ANGIOPATHY WITHOUT GANGRENE, WITH LONG-TERM CURRENT USE OF INSULIN: Primary | Chronic | ICD-10-CM

## 2018-10-26 DIAGNOSIS — E11.51 TYPE 2 DIABETES MELLITUS WITH DIABETIC PERIPHERAL ANGIOPATHY WITHOUT GANGRENE, WITH LONG-TERM CURRENT USE OF INSULIN: Primary | Chronic | ICD-10-CM

## 2018-10-26 DIAGNOSIS — G47.33 OBSTRUCTIVE SLEEP APNEA SYNDROME: Chronic | ICD-10-CM

## 2018-10-26 DIAGNOSIS — I25.83 CORONARY ARTERY DISEASE DUE TO LIPID RICH PLAQUE: Chronic | ICD-10-CM

## 2018-10-26 DIAGNOSIS — Z95.5 S/P CORONARY ARTERY STENT PLACEMENT: ICD-10-CM

## 2018-10-26 DIAGNOSIS — E11.42 DIABETIC PERIPHERAL NEUROPATHY ASSOCIATED WITH TYPE 2 DIABETES MELLITUS: ICD-10-CM

## 2018-10-26 PROBLEM — S92.354D CLOSED NONDISPLACED FRACTURE OF FIFTH METATARSAL BONE OF RIGHT FOOT WITH ROUTINE HEALING: Status: RESOLVED | Noted: 2017-07-14 | Resolved: 2018-10-26

## 2018-10-26 PROCEDURE — 99214 OFFICE O/P EST MOD 30 MIN: CPT | Mod: S$PBB,,, | Performed by: NURSE PRACTITIONER

## 2018-10-26 PROCEDURE — 99999 PR PBB SHADOW E&M-EST. PATIENT-LVL V: CPT | Mod: PBBFAC,,, | Performed by: NURSE PRACTITIONER

## 2018-10-26 PROCEDURE — 99215 OFFICE O/P EST HI 40 MIN: CPT | Mod: PBBFAC | Performed by: NURSE PRACTITIONER

## 2018-10-26 RX ORDER — INSULIN GLARGINE 300 [IU]/ML
50 INJECTION, SOLUTION SUBCUTANEOUS 2 TIMES DAILY
Qty: 6 SYRINGE | Refills: 6 | Status: ON HOLD | OUTPATIENT
Start: 2018-10-26 | End: 2019-06-05 | Stop reason: HOSPADM

## 2018-10-26 RX ORDER — CANAGLIFLOZIN 300 MG/1
300 TABLET, FILM COATED ORAL DAILY
Qty: 30 TABLET | Refills: 6 | Status: ON HOLD | OUTPATIENT
Start: 2018-10-26 | End: 2019-05-29

## 2018-10-26 NOTE — Clinical Note
After discussion with the patient, the Endocrine Diabetes Team has determined that this patient's diabetes is currently under reasonable control with A1c trending down. The patient can return to routine follow up with their primary care provider. Endocrine recommendations for future management goals and strategies are outlined in the plan as she is already on intensive insulin therapy with max dose SLGT2i and GLP1 RA. She did not tolerate metformin. The patient is encouraged to return to endocrinology if any new concerns arise or if diabetes control deteriorates. Thank you for letting the Endocrine Diabetes Team participate in the care of your patient.
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
## 2018-10-26 NOTE — PATIENT INSTRUCTIONS
Snacks can be an important part of a balanced, healthy meal plan. They allow you to eat more frequently, feeling full and satisfied throughout the day. Also, they allow you to spread carbohydrates evenly, which may stabilize blood sugars.  Plus, snacks are enjoyable!     The amount of carbohydrate needed at snacks varies. Generally, about 15-30 grams of carbohydrate per snack is recommended.  Below you will find some tasty treats.       0-5 gm carb   Crystal Light   Vitamin Water Zero   Herbal tea, unsweetened   2 tsp peanut butter on celery   1./2 cup sugar-free jell-o   1 sugar-free popsicle   ¼ cup blueberries   8oz Blue Marta unsweetened almond milk   5 baby carrots & celery sticks, cucumbers, bell peppers dipped in ¼ cup salsa, 2Tbsp light ranch dressing or 2Tbsp plain Greek yogurt   10 Goldfish crackers   ½ oz low-fat cheese or string cheese   1 closed handful of nuts, unsalted   1 Tbsp of sunflower seeds, unsalted   1 cup Smart Pop popcorn   1 whole grain brown rice cake        15 gm carb   1 small piece of fruit or ½ banana or 1/2 cup lite canned fruit   3 ellen cracker squares   3 cups Smart Pop popcorn, top spray butter, Hernández lite salt or cinnamon and Truvia   5 Vanilla Wafers   ½ cup low fat, no added sugar ice cream or frozen yogurt (Blue bell, Blue Bunny, Weight Watchers, Skinny Cow)   ½ turkey, ham, or chicken sandwich   ½ c fruit with ½ c Cottage cheese   4-6 unsalted wheat crackers with 1 oz low fat cheese or 1 tbsp peanut butter    30-45 goldfish crackers (depending on flavor)    7-8 Mu-ism mini brown rice cakes (caramel, apple cinnamon, chocolate)    12 Mu-ism mini brown rice cakes (cheddar, bbq, ranch)    1/3 cup hummus dip with raw veg   1/2 whole wheat john, 1Tbsp hummus   Mini Pizza (1/2 whole wheat English muffin, low-fat  cheese, tomato sauce)   100 calorie snack pack (Oreo, Chips Ahoy, Ritz Mix, Baked Cheetos)   4-6 oz. light or Greek Style yogurt  (Moody, Xavier, [facility], [facility])   ½ cup sugar-free pudding     6 in. wheat tortilla or john oven toasted chips (topped with spray butter flavoring, cinnamon, Truvia OR spray butter, garlic powder, chili powder)    18 BBQ Popchips (available at [employer], [employer], Fresh Market)

## 2018-11-07 ENCOUNTER — TELEPHONE (OUTPATIENT)
Dept: INTERNAL MEDICINE | Facility: CLINIC | Age: 58
End: 2018-11-07

## 2018-11-07 ENCOUNTER — OFFICE VISIT (OUTPATIENT)
Dept: INTERNAL MEDICINE | Facility: CLINIC | Age: 58
End: 2018-11-07
Payer: MEDICARE

## 2018-11-07 ENCOUNTER — HOSPITAL ENCOUNTER (OUTPATIENT)
Dept: RADIOLOGY | Facility: HOSPITAL | Age: 58
Discharge: HOME | End: 2018-11-07
Attending: INTERNAL MEDICINE
Payer: MEDICARE

## 2018-11-07 VITALS
BODY MASS INDEX: 27.52 KG/M2 | HEART RATE: 70 BPM | RESPIRATION RATE: 10 BRPM | WEIGHT: 161.19 LBS | DIASTOLIC BLOOD PRESSURE: 80 MMHG | HEIGHT: 64 IN | SYSTOLIC BLOOD PRESSURE: 142 MMHG

## 2018-11-07 DIAGNOSIS — M79.671 RIGHT FOOT PAIN: ICD-10-CM

## 2018-11-07 DIAGNOSIS — I15.2 HYPERTENSION ASSOCIATED WITH DIABETES: Chronic | ICD-10-CM

## 2018-11-07 DIAGNOSIS — M79.671 RIGHT FOOT PAIN: Primary | ICD-10-CM

## 2018-11-07 DIAGNOSIS — E11.59 HYPERTENSION ASSOCIATED WITH DIABETES: Chronic | ICD-10-CM

## 2018-11-07 PROCEDURE — 3077F SYST BP >= 140 MM HG: CPT | Mod: CPTII,S$GLB,, | Performed by: INTERNAL MEDICINE

## 2018-11-07 PROCEDURE — 73630 X-RAY EXAM OF FOOT: CPT | Mod: 26,RT,, | Performed by: RADIOLOGY

## 2018-11-07 PROCEDURE — 99214 OFFICE O/P EST MOD 30 MIN: CPT | Mod: S$GLB,,, | Performed by: INTERNAL MEDICINE

## 2018-11-07 PROCEDURE — 73630 X-RAY EXAM OF FOOT: CPT | Mod: TC,PO,RT

## 2018-11-07 PROCEDURE — 3045F PR MOST RECENT HEMOGLOBIN A1C LEVEL 7.0-9.0%: CPT | Mod: CPTII,S$GLB,, | Performed by: INTERNAL MEDICINE

## 2018-11-07 PROCEDURE — 3008F BODY MASS INDEX DOCD: CPT | Mod: CPTII,S$GLB,, | Performed by: INTERNAL MEDICINE

## 2018-11-07 PROCEDURE — 99999 PR PBB SHADOW E&M-EST. PATIENT-LVL III: CPT | Mod: PBBFAC,,, | Performed by: INTERNAL MEDICINE

## 2018-11-07 PROCEDURE — 3079F DIAST BP 80-89 MM HG: CPT | Mod: CPTII,S$GLB,, | Performed by: INTERNAL MEDICINE

## 2018-11-07 RX ORDER — IRBESARTAN 300 MG/1
300 TABLET ORAL NIGHTLY
Qty: 90 TABLET | Refills: 3 | Status: ON HOLD | OUTPATIENT
Start: 2018-11-07 | End: 2019-07-02 | Stop reason: HOSPADM

## 2018-11-07 NOTE — PROGRESS NOTES
Subjective:       Patient ID: Mariann Huff is a 58 y.o. female.    Chief Complaint: Foot Pain    HPI     59 yo female here for evaluation of right foot pain. This started again last Friday or Saturday.  She broke in April last year.  She had a boot and stimulator.  She remembers no trauma to her foot.      Review of Systems    Objective:      Physical Exam   Constitutional: She is oriented to person, place, and time. She appears well-developed and well-nourished.   HENT:   Head: Normocephalic and atraumatic.   Mouth/Throat: No oropharyngeal exudate.   Eyes: EOM are normal. Pupils are equal, round, and reactive to light. Right eye exhibits no discharge. Left eye exhibits no discharge. No scleral icterus.   Neck: Normal range of motion. Neck supple. No tracheal deviation present. No thyromegaly present.   Cardiovascular: Normal rate, regular rhythm and normal heart sounds. Exam reveals no gallop and no friction rub.   No murmur heard.  Pulmonary/Chest: Effort normal and breath sounds normal. No respiratory distress. She has no wheezes. She has no rales. She exhibits no tenderness.   Abdominal: Soft. Bowel sounds are normal. She exhibits no distension and no mass. There is no tenderness. There is no rebound and no guarding.   Musculoskeletal: Normal range of motion. She exhibits no edema or tenderness.   Neurological: She is alert and oriented to person, place, and time.   Skin: Skin is warm and dry. No rash noted. No erythema. No pallor.   Psychiatric: She has a normal mood and affect. Her behavior is normal.   Vitals reviewed.      Assessment:       1. Right foot pain    2. Hypertension associated with diabetes        Plan:       1.  Refer to Podiatry.  X-ray foot.  Naproxen to be started on Friday.  Patient were boot.  2.  Change from losartan to irbesartan 300 mg daily.    Return to clinic in a month to reassess.
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
## 2018-11-08 ENCOUNTER — OFFICE VISIT (OUTPATIENT)
Dept: PODIATRY | Facility: CLINIC | Age: 58
End: 2018-11-08
Payer: MEDICARE

## 2018-11-08 VITALS
HEART RATE: 77 BPM | WEIGHT: 161 LBS | HEIGHT: 64 IN | SYSTOLIC BLOOD PRESSURE: 140 MMHG | BODY MASS INDEX: 27.49 KG/M2 | DIASTOLIC BLOOD PRESSURE: 72 MMHG

## 2018-11-08 DIAGNOSIS — E11.51 TYPE 2 DIABETES MELLITUS WITH DIABETIC PERIPHERAL ANGIOPATHY WITHOUT GANGRENE, UNSPECIFIED WHETHER LONG TERM INSULIN USE: Chronic | ICD-10-CM

## 2018-11-08 DIAGNOSIS — M79.671 RIGHT FOOT PAIN: Primary | ICD-10-CM

## 2018-11-08 PROCEDURE — 3008F BODY MASS INDEX DOCD: CPT | Mod: CPTII,S$GLB,, | Performed by: PODIATRIST

## 2018-11-08 PROCEDURE — 99213 OFFICE O/P EST LOW 20 MIN: CPT | Mod: S$GLB,,, | Performed by: PODIATRIST

## 2018-11-08 PROCEDURE — 3078F DIAST BP <80 MM HG: CPT | Mod: CPTII,S$GLB,, | Performed by: PODIATRIST

## 2018-11-08 PROCEDURE — 99999 PR PBB SHADOW E&M-EST. PATIENT-LVL IV: CPT | Mod: PBBFAC,,, | Performed by: PODIATRIST

## 2018-11-08 PROCEDURE — 3077F SYST BP >= 140 MM HG: CPT | Mod: CPTII,S$GLB,, | Performed by: PODIATRIST

## 2018-11-08 PROCEDURE — 3045F PR MOST RECENT HEMOGLOBIN A1C LEVEL 7.0-9.0%: CPT | Mod: CPTII,S$GLB,, | Performed by: PODIATRIST

## 2018-11-08 RX ORDER — MELOXICAM 15 MG/1
15 TABLET ORAL DAILY
Qty: 30 TABLET | Refills: 0 | Status: SHIPPED | OUTPATIENT
Start: 2018-11-08 | End: 2018-11-19

## 2018-11-08 NOTE — LETTER
November 12, 2018      Jasbir Haney MD  [address]  [place] [address]           [place] - Podiatry  [address] [facility] [address]  Phone: [phone]          Patient: Mariann Huff   MR Number: [id number]   YOB: 1960   Date of Visit: 11/8/2018       Dear Dr. Jasbir Haney:    Thank you for referring Mariann Huff to me for evaluation. Attached you will find relevant portions of my assessment and plan of care.    If you have questions, please do not hesitate to call me. I look forward to following Mariann Huff along with you.    Sincerely,    Amber Barajas, HARISH    Enclosure  CC:  No Recipients    If you would like to receive this communication electronically, please contact [email or web address] or [phone] to request more information on [employer] access.    For providers and/or their staff who would like to refer a patient to [facility], please contact us through our one-stop-shop provider referral line, Esmer Campbell, at [phone].    If you feel you have received this communication in error or would no longer like to receive these types of communications, please e-mail [email or web address]

## 2018-11-11 DIAGNOSIS — E78.5 HYPERLIPIDEMIA: Chronic | ICD-10-CM

## 2018-11-11 DIAGNOSIS — I10 HTN (HYPERTENSION), BENIGN: Chronic | ICD-10-CM

## 2018-11-12 ENCOUNTER — TELEPHONE (OUTPATIENT)
Dept: PAIN MEDICINE | Facility: CLINIC | Age: 58
End: 2018-11-12

## 2018-11-12 RX ORDER — AMLODIPINE BESYLATE 10 MG/1
TABLET ORAL
Qty: 30 TABLET | Refills: 0 | Status: SHIPPED | OUTPATIENT
Start: 2018-11-12 | End: 2018-11-19 | Stop reason: SDUPTHER

## 2018-11-12 RX ORDER — ATORVASTATIN CALCIUM 40 MG/1
TABLET, FILM COATED ORAL
Qty: 30 TABLET | Refills: 11 | Status: SHIPPED | OUTPATIENT
Start: 2018-11-12 | End: 2018-11-19 | Stop reason: SDUPTHER

## 2018-11-12 NOTE — TELEPHONE ENCOUNTER
----- Message from Ashly Bravo sent at 11/12/2018  1:19 PM CST -----  Contact: Self/ [phone]  Patient called to reschedule her procedure. She stated she has a cold.    Please call.

## 2018-11-12 NOTE — PROGRESS NOTES
Subjective:      Patient ID: Mariann Huff is a 58 y.o. female.    Chief Complaint: Foot Pain ( right foot / dr lizy logan  11/7/18)    Mariann is a 58 y.o. female who presents to the podiatry clinic  with complaint of  right foot pain. Onset of the symptoms was several months ago. Precipitating event: injured several years ago. Current symptoms include: ability to bear weight, but with some pain. Aggravating factors: any weight bearing. Symptoms have progressed to a point and plateaued. Patient has had prior foot problems. Evaluation to date: plain films: normal. Treatment to date: none. Patients rates pain 4/10 on pain scale.        Review of Systems   Constitution: Negative for chills, fever and malaise/fatigue.   HENT: Negative for hearing loss.    Cardiovascular: Negative for claudication.   Respiratory: Negative for shortness of breath.    Skin: Positive for color change, nail changes and unusual hair distribution. Negative for flushing and rash.   Musculoskeletal: Negative for joint pain and myalgias.   Neurological: Positive for paresthesias. Negative for loss of balance, numbness and sensory change.   Psychiatric/Behavioral: Negative for altered mental status.           Objective:      Physical Exam   Constitutional: She appears well-developed and well-nourished. She is cooperative.   Cardiovascular:   Pulses:       Dorsalis pedis pulses are 1+ on the right side, and 1+ on the left side.        Posterior tibial pulses are 2+ on the right side, and 2+ on the left side.   no LE edema noted b/L   Musculoskeletal:        Right ankle: She exhibits decreased range of motion and abnormal pulse. Achilles tendon exhibits normal Nick's test results.        Left ankle: She exhibits decreased range of motion and abnormal pulse. Achilles tendon exhibits normal Nick's test results.        Right foot: There is decreased range of motion.        Left foot: There is decreased range of motion.   POP to entire shaft  of right 5th metatarsal.   Adequate joint ROM noted to all lower extremity muscle groups.   Muscle strength is 5/5 in all groups bilaterally.     Feet:   Right Foot:   Protective Sensation: 5 sites tested. 2 sites sensed.   Left Foot:   Protective Sensation: 5 sites tested. 2 sites sensed.   Neurological: She is alert.   diminished sensation noted to b/L lower extremities   Skin: Skin is dry. Capillary refill takes more than 3 seconds.    No open lesions noted b/L     Psychiatric: Her behavior is normal.   Vitals reviewed.            Assessment:       Encounter Diagnoses   Name Primary?    Right foot pain Yes    Type 2 diabetes mellitus with diabetic peripheral angiopathy without gangrene, unspecified whether long term insulin use          Plan:       Mariann was seen today for foot pain.    Diagnoses and all orders for this visit:    Right foot pain  -     meloxicam (MOBIC) 15 MG tablet; Take 1 tablet (15 mg total) by mouth once daily.    Type 2 diabetes mellitus with diabetic peripheral angiopathy without gangrene, unspecified whether long term insulin use  -     DIABETIC SHOES FOR HOME USE      I counseled the patient on her conditions, their implications and medical management.      Shoe modification advised for the pt. Pt was advised to obtain shoes will accommodate foot deformities.     Pt advised on RICE and OTC NSAIDs for associated pain.   Pt prescribed diabetic shoes and mobic  Pt is to RTC in 6 months.   .

## 2018-11-19 ENCOUNTER — TELEPHONE (OUTPATIENT)
Dept: PHARMACY | Facility: CLINIC | Age: 58
End: 2018-11-19

## 2018-11-19 ENCOUNTER — OFFICE VISIT (OUTPATIENT)
Dept: CARDIOLOGY | Facility: CLINIC | Age: 58
End: 2018-11-19
Payer: MEDICARE

## 2018-11-19 VITALS
BODY MASS INDEX: 27.38 KG/M2 | HEART RATE: 76 BPM | SYSTOLIC BLOOD PRESSURE: 156 MMHG | WEIGHT: 160.38 LBS | HEIGHT: 64 IN | DIASTOLIC BLOOD PRESSURE: 92 MMHG

## 2018-11-19 DIAGNOSIS — E11.51 TYPE 2 DIABETES MELLITUS WITH DIABETIC PERIPHERAL ANGIOPATHY WITHOUT GANGRENE, UNSPECIFIED WHETHER LONG TERM INSULIN USE: Chronic | ICD-10-CM

## 2018-11-19 DIAGNOSIS — I25.10 CORONARY ARTERY DISEASE DUE TO LIPID RICH PLAQUE: Primary | Chronic | ICD-10-CM

## 2018-11-19 DIAGNOSIS — I15.2 HYPERTENSION ASSOCIATED WITH DIABETES: Chronic | ICD-10-CM

## 2018-11-19 DIAGNOSIS — G47.33 OBSTRUCTIVE SLEEP APNEA SYNDROME: Chronic | ICD-10-CM

## 2018-11-19 DIAGNOSIS — E11.59 HYPERTENSION ASSOCIATED WITH DIABETES: Chronic | ICD-10-CM

## 2018-11-19 DIAGNOSIS — I65.23 CAROTID STENOSIS, BILATERAL: Chronic | ICD-10-CM

## 2018-11-19 DIAGNOSIS — I25.83 CORONARY ARTERY DISEASE DUE TO LIPID RICH PLAQUE: Primary | Chronic | ICD-10-CM

## 2018-11-19 DIAGNOSIS — E78.2 MIXED HYPERLIPIDEMIA: Chronic | ICD-10-CM

## 2018-11-19 DIAGNOSIS — Z95.5 S/P CORONARY ARTERY STENT PLACEMENT: ICD-10-CM

## 2018-11-19 DIAGNOSIS — K76.0 FATTY LIVER DISEASE, NONALCOHOLIC: ICD-10-CM

## 2018-11-19 DIAGNOSIS — T82.855S CORONARY STENT RESTENOSIS, SEQUELA: ICD-10-CM

## 2018-11-19 PROCEDURE — 3008F BODY MASS INDEX DOCD: CPT | Mod: CPTII,S$GLB,, | Performed by: INTERNAL MEDICINE

## 2018-11-19 PROCEDURE — 99999 PR PBB SHADOW E&M-EST. PATIENT-LVL III: CPT | Mod: PBBFAC,,, | Performed by: INTERNAL MEDICINE

## 2018-11-19 PROCEDURE — 99214 OFFICE O/P EST MOD 30 MIN: CPT | Mod: S$GLB,,, | Performed by: INTERNAL MEDICINE

## 2018-11-19 PROCEDURE — 3077F SYST BP >= 140 MM HG: CPT | Mod: CPTII,S$GLB,, | Performed by: INTERNAL MEDICINE

## 2018-11-19 PROCEDURE — 3080F DIAST BP >= 90 MM HG: CPT | Mod: CPTII,S$GLB,, | Performed by: INTERNAL MEDICINE

## 2018-11-19 PROCEDURE — 3045F PR MOST RECENT HEMOGLOBIN A1C LEVEL 7.0-9.0%: CPT | Mod: CPTII,S$GLB,, | Performed by: INTERNAL MEDICINE

## 2018-11-19 NOTE — PROGRESS NOTES
"Subjective:   Patient ID:  Mariann Huff is a 58 y.o. female who presents for follow-up of Hypertension      HPI: patient has no complaints.  She states she is compliant with diet and medications.  HbA1c is better, but lipids have markedly worsened.    Lab Results   Component Value Date     10/22/2018    K 3.8 10/22/2018     10/22/2018    CO2 27 10/22/2018    BUN 12 10/22/2018    CREATININE 1.0 10/22/2018     (H) 10/22/2018    HGBA1C 8.5 (H) 10/22/2018    MG 2.6 10/09/2015    AST 20 10/22/2018    ALT 17 10/22/2018    ALBUMIN 3.8 10/22/2018    PROT 7.7 10/22/2018    BILITOT 0.5 10/22/2018    WBC 7.03 10/01/2018    HGB 11.8 (L) 10/01/2018    HCT 37.4 10/01/2018    MCV 85 10/01/2018     10/01/2018    INR 0.9 02/17/2014    TSH 1.170 10/22/2018    CHOL 268 (H) 10/22/2018    HDL 44 10/22/2018    LDLCALC 193.6 (H) 10/22/2018    TRIG 152 (H) 10/22/2018       Review of Systems   Constitution: Negative.   HENT: Negative.    Eyes: Negative.    Cardiovascular: Positive for dyspnea on exertion. Negative for chest pain, claudication, irregular heartbeat, leg swelling, near-syncope, palpitations and syncope.   Respiratory: Negative.  Negative for cough, shortness of breath, snoring and wheezing.    Endocrine: Positive for polydipsia and polyuria. Negative for cold intolerance, heat intolerance and polyphagia.   Skin: Negative.    Musculoskeletal: Positive for back pain.   Gastrointestinal: Negative.    Genitourinary: Negative.    Neurological: Negative.    Psychiatric/Behavioral: Negative.        Objective:   Physical Exam   Constitutional: She is oriented to person, place, and time. She appears well-developed and well-nourished.   BP (!) 156/92   Pulse 76   Ht 5' 4" (1.626 m)   Wt 72.7 kg (160 lb 6.2 oz)   BMI 27.53 kg/m²      HENT:   Head: Normocephalic.   Eyes: Pupils are equal, round, and reactive to light.   Neck: Normal range of motion. Neck supple. Carotid bruit is not present. No " thyromegaly present.   Cardiovascular: Normal rate, regular rhythm, normal heart sounds and intact distal pulses. Exam reveals no gallop and no friction rub.   No murmur heard.  Pulses:       Carotid pulses are 2+ on the right side, and 2+ on the left side.       Radial pulses are 2+ on the right side, and 2+ on the left side.        Femoral pulses are 2+ on the right side, and 2+ on the left side.       Popliteal pulses are 2+ on the right side, and 2+ on the left side.        Dorsalis pedis pulses are 2+ on the right side, and 2+ on the left side.        Posterior tibial pulses are 2+ on the right side, and 2+ on the left side.   Pulmonary/Chest: Effort normal and breath sounds normal. No respiratory distress. She has no wheezes. She has no rales. She exhibits no tenderness.   Abdominal: Soft. Bowel sounds are normal.   obese   Musculoskeletal: Normal range of motion. She exhibits no edema.   Neurological: She is alert and oriented to person, place, and time.   Skin: Skin is warm and dry.   Psychiatric: She has a normal mood and affect.   Nursing note and vitals reviewed.        Assessment and Plan:     Problem List Items Addressed This Visit        Cardiology Problems    Hypertension associated with diabetes (Chronic)    Hyperlipidemia (Chronic)    CAD (coronary artery disease) - Primary (Chronic)    Carotid stenosis, bilateral (Chronic)    Type 2 diabetes mellitus with diabetic peripheral angiopathy without gangrene (Chronic)       Other    Sleep apnea (Chronic)    Coronary stent restenosis    S/P coronary artery stent placement    Fatty liver disease, nonalcoholic             Medication List           Accurate as of 11/19/18 12:10 PM. If you have any questions, ask your nurse or doctor.               CONTINUE taking these medications    ACCU-CHEK EDIN PLUS METER Misc  Generic drug:  blood-glucose meter     albuterol 90 mcg/actuation inhaler  Commonly known as:  PROVENTIL/VENTOLIN HFA  Inhale 1-2 puffs into the  "lungs every 6 (six) hours as needed.     amLODIPine 10 MG tablet  Commonly known as:  NORVASC  TAKE 1 TABLET (10 MG TOTAL) BY MOUTH ONCE DAILY.     aspirin 81 mg Tab     atorvastatin 40 MG tablet  Commonly known as:  LIPITOR  TAKE 1 TABLET (40 MG TOTAL) BY MOUTH ONCE DAILY.     BD ULTRA-FINE MINI PEN NEEDLE 31 gauge x 3/16" Ndle  Generic drug:  pen needle, diabetic  USE 4 TIMES A DAY     blood sugar diagnostic Strp  Commonly known as:  ACCU-CHEK EDIN PLUS TEST STRP  TEST BLOOD SUGARS 4 TIMES DAILY     chlorthalidone 25 MG Tab  Commonly known as:  HYGROTEN  Take 1 tablet (25 mg total) by mouth once daily.     cyclobenzaprine 10 MG tablet  Commonly known as:  FLEXERIL  TAKE 1 TABLET BY MOUTH 3 TIMES A DAY AS NEEDED FOR MUSCLE SPASMS. NO WORKING OR DRIVING ON THIS MED     dulaglutide 1.5 mg/0.5 mL Pnij  Commonly known as:  TRULICITY  Inject 1.5 mg into the skin every 7 days.     esomeprazole 40 MG capsule  Commonly known as:  NEXIUM  Take 1 capsule (40 mg total) by mouth before breakfast.     fenofibrate micronized 134 MG Cap  Commonly known as:  LOFIBRA  TAKE 1 CAPSULE (134 MG TOTAL) BY MOUTH BEFORE BREAKFAST.     gabapentin 100 MG capsule  Commonly known as:  NEURONTIN  Take 2 capsules (200 mg total) by mouth every evening.     insulin glargine (TOUJEO) 300 unit/mL (1.5 mL) Inpn pen  Commonly known as:  TOUJEO SOLOSTAR U-300 INSULIN  Inject 50 Units into the skin 2 (two) times daily.     INVOKANA 300 mg Tab tablet  Generic drug:  canagliflozin  Take 1 tablet (300 mg total) by mouth once daily.     irbesartan 300 MG tablet  Commonly known as:  AVAPRO  Take 1 tablet (300 mg total) by mouth every evening.     lancets 30 gauge Misc     metoprolol succinate 100 MG 24 hr tablet  Commonly known as:  TOPROL-XL  TAKE 1 TABLET (100 MG TOTAL) BY MOUTH ONCE DAILY.     nitroGLYCERIN 0.4 MG SL tablet  Commonly known as:  NITROSTAT  Take one every 2-3 min till chestpain relief for 3 times and if still no relief, call MD or come to " "ED     NovoLOG Flexpen U-100 Insulin 100 unit/mL Inpn pen  Generic drug:  insulin aspart U-100  INJECT 30 U AM, 40 U AT NOON, 30 U PM.150-200 +2, 201-250 +4, 251-300 +6, 301-350 +8, >350 +10     omega-3 acid ethyl esters 1 gram capsule  Commonly known as:  LOVAZA     pen needle, diabetic 32 gauge x 5/32" Ndle  Commonly known as:  BD ULTRA-FINE GRABIEL PEN NEEDLE  For use with insulin pens daily 4 times a day     PRALUENT SYRINGE 75 mg/mL Syrg  Generic drug:  alirocumab     prasugrel 10 mg Tab  Commonly known as:  EFFIENT  TAKE 1 TABLET (10 MG TOTAL) BY MOUTH ONCE DAILY.     traMADol 50 mg tablet  Commonly known as:  ULTRAM  Take 1 tablet (50 mg total) by mouth 3 (three) times daily as needed for Pain.     zolpidem 5 MG Tab  Commonly known as:  AMBIEN  TAKE 1 TABLET BY MOUTH NIGHTLY AS NEEDED        STOP taking these medications    meloxicam 15 MG tablet  Commonly known as:  MOBIC  Stopped by:  Adelaide Aguilar MD        Compliance reinforced.  Start Praluent 75mg 2 x month and repeat blood work in 2 months    No Follow-up on file.          "

## 2018-11-21 NOTE — TELEPHONE ENCOUNTER
Documentation Only:    Faxed Prior Authorization form and supporting documentation for Praluent to insurance on 11/21/2018.

## 2018-11-23 NOTE — TELEPHONE ENCOUNTER
Documentation Only:    Prior Authorization for Praluent has been approved for 6 months.    Approval dates:  11/23/2018 through 05/23/2019    Patient co-pay:  $54.04    Researching possible co-pay assistance.

## 2018-11-27 ENCOUNTER — OFFICE VISIT (OUTPATIENT)
Dept: INTERNAL MEDICINE | Facility: CLINIC | Age: 58
End: 2018-11-27
Payer: MEDICARE

## 2018-11-27 VITALS
WEIGHT: 159.19 LBS | HEIGHT: 67 IN | DIASTOLIC BLOOD PRESSURE: 83 MMHG | RESPIRATION RATE: 16 BRPM | HEART RATE: 81 BPM | TEMPERATURE: 99 F | SYSTOLIC BLOOD PRESSURE: 174 MMHG | BODY MASS INDEX: 24.99 KG/M2

## 2018-11-27 DIAGNOSIS — Z95.5 S/P CORONARY ARTERY STENT PLACEMENT: ICD-10-CM

## 2018-11-27 DIAGNOSIS — J20.8 ACUTE BACTERIAL BRONCHITIS: Primary | ICD-10-CM

## 2018-11-27 DIAGNOSIS — B96.89 ACUTE BACTERIAL BRONCHITIS: Primary | ICD-10-CM

## 2018-11-27 DIAGNOSIS — E11.51 TYPE 2 DIABETES MELLITUS WITH DIABETIC PERIPHERAL ANGIOPATHY WITHOUT GANGRENE, WITHOUT LONG-TERM CURRENT USE OF INSULIN: ICD-10-CM

## 2018-11-27 DIAGNOSIS — R06.02 SOB (SHORTNESS OF BREATH): ICD-10-CM

## 2018-11-27 PROCEDURE — 99214 OFFICE O/P EST MOD 30 MIN: CPT | Mod: 25,S$GLB,, | Performed by: INTERNAL MEDICINE

## 2018-11-27 PROCEDURE — 3045F PR MOST RECENT HEMOGLOBIN A1C LEVEL 7.0-9.0%: CPT | Mod: CPTII,S$GLB,, | Performed by: INTERNAL MEDICINE

## 2018-11-27 PROCEDURE — 96372 THER/PROPH/DIAG INJ SC/IM: CPT | Mod: S$GLB,,, | Performed by: INTERNAL MEDICINE

## 2018-11-27 PROCEDURE — 3079F DIAST BP 80-89 MM HG: CPT | Mod: CPTII,S$GLB,, | Performed by: INTERNAL MEDICINE

## 2018-11-27 PROCEDURE — 3077F SYST BP >= 140 MM HG: CPT | Mod: CPTII,S$GLB,, | Performed by: INTERNAL MEDICINE

## 2018-11-27 PROCEDURE — 99999 PR PBB SHADOW E&M-EST. PATIENT-LVL IV: CPT | Mod: PBBFAC,,, | Performed by: INTERNAL MEDICINE

## 2018-11-27 PROCEDURE — 3008F BODY MASS INDEX DOCD: CPT | Mod: CPTII,S$GLB,, | Performed by: INTERNAL MEDICINE

## 2018-11-27 RX ORDER — CODEINE PHOSPHATE AND GUAIFENESIN 10; 100 MG/5ML; MG/5ML
5 SOLUTION ORAL 3 TIMES DAILY PRN
Qty: 240 ML | Refills: 0 | Status: SHIPPED | OUTPATIENT
Start: 2018-11-27 | End: 2018-12-07

## 2018-11-27 RX ORDER — TRIAMCINOLONE ACETONIDE 40 MG/ML
40 INJECTION, SUSPENSION INTRA-ARTICULAR; INTRAMUSCULAR ONCE
Status: COMPLETED | OUTPATIENT
Start: 2018-11-27 | End: 2018-11-27

## 2018-11-27 RX ORDER — AZITHROMYCIN 250 MG/1
TABLET, FILM COATED ORAL
Qty: 6 TABLET | Refills: 0 | Status: SHIPPED | OUTPATIENT
Start: 2018-11-27 | End: 2019-04-04

## 2018-11-27 RX ADMIN — TRIAMCINOLONE ACETONIDE 40 MG: 40 INJECTION, SUSPENSION INTRA-ARTICULAR; INTRAMUSCULAR at 10:11

## 2018-11-27 NOTE — PROGRESS NOTES
Subjective:       Patient ID: Mariann Huff is a 58 y.o. female.    Chief Complaint: Nasal Congestion and Cough  HISTORY OF PRESENT ILLNESS:  This is a 58-year-old black female patient coming   in with a cough that has been slightly productive for 4 days.  She has had with   this chills, sinus congestion, drainage and some shortness of breath.  She had   her flu shot several weeks prior to the visit.  The patient seems to have   shortness of breath, mostly related to her nasal congestion, not from breathing   through her mouth.  She has had no wheezing.  The patient does have diabetes and   coronary disease.  The patient normally sees Dr. Haney.    PHYSICAL EXAMINATION:  VITAL SIGNS:  Normal including temperature.  SKIN:  No rash.  HEENT:  Clear.  NECK:  Shows no stiffness or adenopathy.  CHEST:  Clear.  No wheezing, dullness or rales.    IMPRESSION AND PLAN:  1.  Acute bacterial bronchitis.  2.  Shortness of breath, probably due to nasal congestion.  3.  Diabetes.  4.  Status post coronary stents.  The patient was given a Z-PHONG, Kenalog 40 mg   IM, told to drink plenty of fluids, get on nasal saline flush and Mucinex.      JDC/IN  dd: 11/28/2018 17:29:29 (CST)  td: 11/29/2018 08:49:59 (CST)  Doc ID   #[id number]  Job ID #[id number]    CC:     Dict [id number]  HPI  Review of Systems   Constitutional: Negative.    HENT: Positive for congestion and sinus pressure. Negative for hearing loss, sneezing, sore throat and voice change.    Eyes: Negative for visual disturbance.   Respiratory: Positive for cough and shortness of breath. Negative for chest tightness.    Cardiovascular: Negative.  Negative for chest pain, palpitations and leg swelling.   Gastrointestinal: Negative.    Genitourinary: Negative for difficulty urinating, dysuria, flank pain, frequency, hematuria, menstrual problem, pelvic pain, urgency, vaginal bleeding and vaginal discharge.   Musculoskeletal: Negative.  Negative for neck pain and neck stiffness.   Skin:  Negative.    Neurological: Negative.  Negative for dizziness, seizures, light-headedness, numbness and headaches.   Psychiatric/Behavioral: Negative for agitation, behavioral problems, confusion and sleep disturbance. The patient is not nervous/anxious.        Objective:      Physical Exam   Constitutional: She is oriented to person, place, and time. She appears well-developed and well-nourished.   Eyes: EOM are normal. Pupils are equal, round, and reactive to light.   Neck: Normal range of motion. Neck supple. No JVD present. No thyromegaly present.   Cardiovascular: Normal rate, regular rhythm, normal heart sounds and intact distal pulses. Exam reveals no gallop.   No murmur heard.  Pulmonary/Chest: Breath sounds normal. She has no wheezes. She has no rales.   Abdominal: Soft. Bowel sounds are normal. She exhibits no mass. There is no tenderness.   Musculoskeletal: Normal range of motion.   Lymphadenopathy:     She has no cervical adenopathy.   Neurological: She is alert and oriented to person, place, and time. She has normal reflexes. No cranial nerve deficit.   Skin: No rash noted. No erythema.   Psychiatric: She has a normal mood and affect. Judgment normal.       Assessment:       1. Acute bacterial bronchitis    2. SOB (shortness of breath)    3. Type 2 diabetes mellitus with diabetic peripheral angiopathy without gangrene, without long-term current use of insulin    4. S/P coronary artery stent placement        Plan:

## 2018-11-29 ENCOUNTER — NURSE TRIAGE (OUTPATIENT)
Dept: ADMINISTRATIVE | Facility: CLINIC | Age: 58
End: 2018-11-29

## 2018-11-29 NOTE — TELEPHONE ENCOUNTER
Reason for Disposition   Wheezing (high pitched whistling sound) and previous asthma attacks or use of asthma medicines   MILD asthma attack (e.g., no SOB at rest, mild SOB with walking, speaks normally in sentences, mild wheezing) and persists > 24 hours on appropriate treatment    Protocols used: ST BREATHING DIFFICULTY-A-OH, ST ASTHMA ATTACK-A-OH    Ms. Huff states she is not having improvement in bronchitis symptoms after starting on antibiotics on Monday. Patient is requesting an appointment on tomorrow. Appointment scheduled.

## 2018-11-30 ENCOUNTER — OFFICE VISIT (OUTPATIENT)
Dept: INTERNAL MEDICINE | Facility: CLINIC | Age: 58
End: 2018-11-30
Payer: MEDICARE

## 2018-11-30 VITALS — HEIGHT: 67 IN | HEART RATE: 70 BPM | WEIGHT: 160.5 LBS | RESPIRATION RATE: 10 BRPM | BODY MASS INDEX: 25.19 KG/M2

## 2018-11-30 DIAGNOSIS — J20.8 ACUTE BACTERIAL BRONCHITIS: Primary | ICD-10-CM

## 2018-11-30 DIAGNOSIS — B96.89 ACUTE BACTERIAL BRONCHITIS: Primary | ICD-10-CM

## 2018-11-30 DIAGNOSIS — E11.51 TYPE 2 DIABETES MELLITUS WITH DIABETIC PERIPHERAL ANGIOPATHY WITHOUT GANGRENE, UNSPECIFIED WHETHER LONG TERM INSULIN USE: Chronic | ICD-10-CM

## 2018-11-30 PROCEDURE — 99999 PR PBB SHADOW E&M-EST. PATIENT-LVL III: CPT | Mod: PBBFAC,,, | Performed by: INTERNAL MEDICINE

## 2018-11-30 PROCEDURE — 3008F BODY MASS INDEX DOCD: CPT | Mod: CPTII,S$GLB,, | Performed by: INTERNAL MEDICINE

## 2018-11-30 PROCEDURE — 99214 OFFICE O/P EST MOD 30 MIN: CPT | Mod: S$GLB,,, | Performed by: INTERNAL MEDICINE

## 2018-11-30 PROCEDURE — 3045F PR MOST RECENT HEMOGLOBIN A1C LEVEL 7.0-9.0%: CPT | Mod: CPTII,S$GLB,, | Performed by: INTERNAL MEDICINE

## 2018-11-30 RX ORDER — ALBUTEROL SULFATE 90 UG/1
2 AEROSOL, METERED RESPIRATORY (INHALATION) EVERY 6 HOURS PRN
Qty: 1 EACH | Refills: 11 | Status: SHIPPED | OUTPATIENT
Start: 2018-11-30 | End: 2020-02-03 | Stop reason: SDUPTHER

## 2018-11-30 NOTE — PROGRESS NOTES
Subjective:       Patient ID: Mariann Huff is a 58 y.o. female.    Chief Complaint: Bronchitis    HPI     58-year-old female here for evaluation of bronchitis. She was seen 3 days ago by Dr. Barnard.  She reports that she has been coughing and had a congested chest.  She had bronchitis and was wheezing.  She was given cough medicine and antibiotics.  She is feeling the same currently. She is no sure if she is getting better or worse.  She had a steroid injection.  She had no fevers or chills.  She woke up with night sweats two nights straight.  Her cough is dry.  She has a little SOB.      Review of Systems    Objective:      Physical Exam   Constitutional: She is oriented to person, place, and time. She appears well-developed and well-nourished.   HENT:   Head: Normocephalic and atraumatic.   Mouth/Throat: No oropharyngeal exudate.   Eyes: EOM are normal. Pupils are equal, round, and reactive to light. Right eye exhibits no discharge. Left eye exhibits no discharge. No scleral icterus.   Neck: Normal range of motion. Neck supple. No tracheal deviation present. No thyromegaly present.   Cardiovascular: Normal rate, regular rhythm and normal heart sounds. Exam reveals no gallop and no friction rub.   No murmur heard.  Pulmonary/Chest: Effort normal and breath sounds normal. No respiratory distress. She has no wheezes. She has no rales. She exhibits no tenderness.   Abdominal: Soft. Bowel sounds are normal. She exhibits no distension and no mass. There is no tenderness. There is no rebound and no guarding.   Musculoskeletal: Normal range of motion. She exhibits no edema or tenderness.   Neurological: She is alert and oriented to person, place, and time.   Skin: Skin is warm and dry. No rash noted. No erythema. No pallor.   Psychiatric: She has a normal mood and affect. Her behavior is normal.   Vitals reviewed.      Assessment:       1. Acute bacterial bronchitis    2. Type 2 diabetes mellitus with diabetic  peripheral angiopathy without gangrene, unspecified whether long term insulin use        Plan:       1/2.  Complete Z-Reyes as prescribed.  If no improvement on Monday, call back for change in antibiotics.  Would put patient on doxycycline b.i.d. x7 days.  Albuterol inhaler prescribed.

## 2018-12-05 NOTE — TELEPHONE ENCOUNTER
Initial Praluent 75mg/ml PENS consult completed on 18 . Praluent 75mg/ml PENS will be shipped on 18 to arrive at patient's home on 18 via [facility]. $ 54.04 copay ([facility]). Patient will start Praluent 75mg/ml PENS   on  12/10/18 (every other Monday dosing). Address confirmed. Confirmed 2 patient identifiers - name and . Therapy Appropriate.  --Injection experience: insulin - Novolog Pen, Trulicity, Toujeo    Counseled patient on administration directions:  - Inject 75mg ( 1 PEN) into the skin every 14 days.   - Take out of the refrigerator 30-60 minutes prior to injection.  - Wash hands before and after injection.  - Monthly RX will come with gauze, bandaids, and alcohol swabs.  - Patient may inject in either the tops of the thighs, abdomen- but at least 2 inches away from her belly button, or the outer part of her upper arm.  Patient was instructed to rotate injections sites.  - Patient is to wipe down the injection site with the alcohol pad, wait to dry.  Gently squeeze the area of the cleaned skin and hold it firmly.   Place the pen flat against the raised area of skin that is being squeezed, then push down on the button and release,  in 10-15 seconds you will hear a click and the window will go from from clear to yellow, indicating injection is complete.  - Patient should rotate injection sites.   - Patient will use sharps container; once full, per [place] law, she/ he may lock the sharps container and place in her trash. She/ he can then contact the Pharmacy and we will replace the sharps at no additional charge.    Patient was counseled on possible side effects:  - Injection site reaction: redness, soreness, itching, bruising, which should resolve within 3-5 days.  - flu-like symptoms  - Patient understands to monitor blood sugars when initiating Praluent as well.    DDI: none. Medication list reviewed. No DDIs. Pt understands to call [facility] prior to starting nay new medications - OTC, Rx, or herbal.      Patient declines QoL assessment and diet and exercise advise.     Patient did not have any further questions. She was advised to keep a calendar to stay compliant. Consultation included: indication; goals of treatment; administration; storage and handling; side effects; how to handle side effects; the importance of compliance; how to handle missed doses; the importance of laboratory monitoring; the importance of keeping all follow up appointments.  Patient understands to report any medication changes to [facility] and provider. All questions answered and addressed to patients satisfaction. I will f/u with her in 1 week from start, [facility] to contact patient in 3 weeks for refills.

## 2018-12-06 RX ORDER — PRASUGREL 10 MG/1
TABLET, FILM COATED ORAL
Qty: 30 TABLET | Refills: 10 | Status: ON HOLD | OUTPATIENT
Start: 2018-12-06 | End: 2019-06-05 | Stop reason: HOSPADM

## 2018-12-06 RX ORDER — ESOMEPRAZOLE MAGNESIUM 40 MG/1
40 CAPSULE, DELAYED RELEASE ORAL
Qty: 90 CAPSULE | Refills: 3 | Status: ON HOLD | OUTPATIENT
Start: 2018-12-06 | End: 2019-07-02 | Stop reason: HOSPADM

## 2018-12-07 ENCOUNTER — OFFICE VISIT (OUTPATIENT)
Dept: INTERNAL MEDICINE | Facility: CLINIC | Age: 58
End: 2018-12-07
Payer: MEDICARE

## 2018-12-07 VITALS
TEMPERATURE: 98 F | WEIGHT: 160.94 LBS | DIASTOLIC BLOOD PRESSURE: 74 MMHG | HEART RATE: 88 BPM | HEIGHT: 64 IN | SYSTOLIC BLOOD PRESSURE: 160 MMHG | BODY MASS INDEX: 27.48 KG/M2

## 2018-12-07 DIAGNOSIS — I15.2 HYPERTENSION ASSOCIATED WITH DIABETES: Chronic | ICD-10-CM

## 2018-12-07 DIAGNOSIS — E11.59 HYPERTENSION ASSOCIATED WITH DIABETES: Chronic | ICD-10-CM

## 2018-12-07 DIAGNOSIS — E11.51 TYPE 2 DIABETES MELLITUS WITH DIABETIC PERIPHERAL ANGIOPATHY WITHOUT GANGRENE, UNSPECIFIED WHETHER LONG TERM INSULIN USE: Primary | Chronic | ICD-10-CM

## 2018-12-07 DIAGNOSIS — E78.2 MIXED HYPERLIPIDEMIA: Chronic | ICD-10-CM

## 2018-12-07 PROCEDURE — 3045F PR MOST RECENT HEMOGLOBIN A1C LEVEL 7.0-9.0%: CPT | Mod: CPTII,S$GLB,, | Performed by: INTERNAL MEDICINE

## 2018-12-07 PROCEDURE — 3078F DIAST BP <80 MM HG: CPT | Mod: CPTII,S$GLB,, | Performed by: INTERNAL MEDICINE

## 2018-12-07 PROCEDURE — 99214 OFFICE O/P EST MOD 30 MIN: CPT | Mod: S$GLB,,, | Performed by: INTERNAL MEDICINE

## 2018-12-07 PROCEDURE — 3008F BODY MASS INDEX DOCD: CPT | Mod: CPTII,S$GLB,, | Performed by: INTERNAL MEDICINE

## 2018-12-07 PROCEDURE — 3077F SYST BP >= 140 MM HG: CPT | Mod: CPTII,S$GLB,, | Performed by: INTERNAL MEDICINE

## 2018-12-07 PROCEDURE — 99999 PR PBB SHADOW E&M-EST. PATIENT-LVL III: CPT | Mod: PBBFAC,,, | Performed by: INTERNAL MEDICINE

## 2018-12-07 RX ORDER — ZOLPIDEM TARTRATE 5 MG/1
5 TABLET ORAL NIGHTLY PRN
Qty: 30 TABLET | Refills: 2 | Status: ON HOLD | OUTPATIENT
Start: 2018-12-07 | End: 2019-07-02 | Stop reason: HOSPADM

## 2018-12-07 RX ORDER — INSULIN ASPART 100 [IU]/ML
INJECTION, SOLUTION INTRAVENOUS; SUBCUTANEOUS
Qty: 30 SYRINGE | Refills: 1 | Status: ON HOLD | OUTPATIENT
Start: 2018-12-07 | End: 2019-06-05 | Stop reason: HOSPADM

## 2018-12-07 RX ORDER — FENOFIBRATE 134 MG/1
CAPSULE ORAL
Qty: 90 CAPSULE | Refills: 3 | Status: ON HOLD | OUTPATIENT
Start: 2018-12-07 | End: 2019-07-02 | Stop reason: HOSPADM

## 2018-12-07 RX ORDER — CHLORTHALIDONE 50 MG/1
50 TABLET ORAL DAILY
Qty: 90 TABLET | Refills: 3 | Status: ON HOLD | OUTPATIENT
Start: 2018-12-07 | End: 2019-07-02 | Stop reason: HOSPADM

## 2018-12-07 NOTE — PROGRESS NOTES
Subjective:       Patient ID: Mariann Huff is a 58 y.o. female.    Chief Complaint: Follow-up    HPI     58-year-old female here for one-month follow-up.    HTN -  Patient's co morbidities include: DM. Patient is currently on Norvasc 10 mg, chlorthalidone 25 mg, Avapro 300 mg, Toprol- mg.  She does not check her BP at home. Side effects of medications note:  None.  Denies headaches, blurred vision, chest pain, shortness of breath, nausea.    HLD - Patient is currently on praluent (starts this on Monday), Lipitor 40 mg, lofibra 134 mg.  Her last lipid panel was   Cholesterol   Date Value Ref Range Status   10/22/2018 268 (H) 120 - 199 mg/dL Final     Comment:     The National Cholesterol Education Program (NCEP) has set the  following guidelines (reference ranges) for Cholesterol:  Optimal.....................<200 mg/dL  Borderline High.............200-239 mg/dL  High........................> or = 240 mg/dL       Triglycerides   Date Value Ref Range Status   10/22/2018 152 (H) 30 - 150 mg/dL Final     Comment:     The National Cholesterol Education Program (NCEP) has set the  following guidelines (reference values) for triglycerides:  Normal......................<150 mg/dL  Borderline High.............150-199 mg/dL  High........................200-499 mg/dL       HDL   Date Value Ref Range Status   10/22/2018 44 40 - 75 mg/dL Final     Comment:     The National Cholesterol Education Program (NCEP) has set the  following guidelines (reference values) for HDL Cholesterol:  Low...............<40 mg/dL  Optimal...........>60 mg/dL       LDL Cholesterol   Date Value Ref Range Status   10/22/2018 193.6 (H) 63.0 - 159.0 mg/dL Final     Comment:     The National Cholesterol Education Program (NCEP) has set the  following guidelines (reference values) for LDL Cholesterol:  Optimal.......................<130 mg/dL  Borderline High...............130-159 mg/dL  High..........................160-189 mg/dL  Very  High.....................>190 mg/dL     .  Side effects of the medication: none.    Diabetes - NovoLog 30 units, 40 units, 30 units, trulicity 1.5 mg.  She checks her BG at home.  30 day average is 150, but only has 7 readings.  Lab Results   Component Value Date    HGBA1C 8.5 (H) 10/22/2018    HGBA1C 9.2 (H) 05/03/2018    HGBA1C 11.9 (H) 12/29/2017     Lab Results   Component Value Date    GLUF 217 (H) 09/15/2014    LDLCALC 193.6 (H) 10/22/2018    CREATININE 1.0 10/22/2018     Review of Systems    Objective:      Physical Exam   Constitutional: She is oriented to person, place, and time. She appears well-developed and well-nourished.   HENT:   Head: Normocephalic and atraumatic.   Mouth/Throat: No oropharyngeal exudate.   Eyes: EOM are normal. Pupils are equal, round, and reactive to light. Right eye exhibits no discharge. Left eye exhibits no discharge. No scleral icterus.   Neck: Normal range of motion. Neck supple. No tracheal deviation present. No thyromegaly present.   Cardiovascular: Normal rate, regular rhythm and normal heart sounds. Exam reveals no gallop and no friction rub.   No murmur heard.  Pulmonary/Chest: Effort normal and breath sounds normal. No respiratory distress. She has no wheezes. She has no rales. She exhibits no tenderness.   Abdominal: Soft. Bowel sounds are normal. She exhibits no distension and no mass. There is no tenderness. There is no rebound and no guarding.   Musculoskeletal: Normal range of motion. She exhibits no edema or tenderness.   Neurological: She is alert and oriented to person, place, and time.   Skin: Skin is warm and dry. No rash noted. No erythema. No pallor.   Psychiatric: She has a normal mood and affect. Her behavior is normal.   Vitals reviewed.      Assessment:       1. Type 2 diabetes mellitus with diabetic peripheral angiopathy without gangrene, unspecified whether long term insulin use    2. Hypertension associated with diabetes    3. Mixed hyperlipidemia         Plan:       1.  Continue Invokana 300 mg, trulicity 1.5 mg weekly, increase NovoLog 35 units, 45 units, 35 units.  2.  Continue fenofibrate 134 mg, Lipitor 40 mg, Paluent 75 mg.  3.  Increase chlorthalidone to 50 mg.  Continue Toprol- mg, Avapro 300 mg, Norvasc 10 mg.    Patient instructed to check blood sugars 3 times a day.  Return to clinic in about a month and a half.

## 2018-12-12 ENCOUNTER — OFFICE VISIT (OUTPATIENT)
Dept: PAIN MEDICINE | Facility: CLINIC | Age: 58
End: 2018-12-12
Payer: MEDICARE

## 2018-12-12 ENCOUNTER — TELEPHONE (OUTPATIENT)
Dept: PAIN MEDICINE | Facility: CLINIC | Age: 58
End: 2018-12-12

## 2018-12-12 VITALS
SYSTOLIC BLOOD PRESSURE: 179 MMHG | BODY MASS INDEX: 26.78 KG/M2 | DIASTOLIC BLOOD PRESSURE: 89 MMHG | WEIGHT: 156 LBS | HEART RATE: 84 BPM

## 2018-12-12 DIAGNOSIS — M54.17 LUMBOSACRAL RADICULOPATHY: ICD-10-CM

## 2018-12-12 DIAGNOSIS — M54.16 LUMBAR RADICULOPATHY: Primary | ICD-10-CM

## 2018-12-12 DIAGNOSIS — M51.26 HERNIATED LUMBAR INTERVERTEBRAL DISC: ICD-10-CM

## 2018-12-12 DIAGNOSIS — G89.4 CHRONIC PAIN SYNDROME: ICD-10-CM

## 2018-12-12 DIAGNOSIS — M51.37 DDD (DEGENERATIVE DISC DISEASE), LUMBOSACRAL: ICD-10-CM

## 2018-12-12 PROCEDURE — 3008F BODY MASS INDEX DOCD: CPT | Mod: CPTII,S$GLB,, | Performed by: NURSE PRACTITIONER

## 2018-12-12 PROCEDURE — 99213 OFFICE O/P EST LOW 20 MIN: CPT | Mod: S$GLB,,, | Performed by: NURSE PRACTITIONER

## 2018-12-12 PROCEDURE — 3079F DIAST BP 80-89 MM HG: CPT | Mod: CPTII,S$GLB,, | Performed by: NURSE PRACTITIONER

## 2018-12-12 PROCEDURE — 3077F SYST BP >= 140 MM HG: CPT | Mod: CPTII,S$GLB,, | Performed by: NURSE PRACTITIONER

## 2018-12-12 PROCEDURE — 99999 PR PBB SHADOW E&M-EST. PATIENT-LVL III: CPT | Mod: PBBFAC,,, | Performed by: NURSE PRACTITIONER

## 2018-12-12 NOTE — TELEPHONE ENCOUNTER
Pt seen in office today per Diallo Kruse NP, and scheduled for caudal PAULO.  Pt is seen per Dr Abreu, but had to be scheduled with Dr Bentley as Dr Abreu is only here one last procedure day, 12/18/18.  Pt is on Effient and ASA, and would not be able to hold x 7 days for 12/18/18.  Discussed with Diallo Kruse, agreed to schedule pt with Dr Bentley, as pt does not elect to go to [facility].  Pt scheduled on 12/20/18 per Dr Bentley.  Clearance request to hold blood thinners sent to Dr Aguilar in Epic.  Awaiting clearance.  Advised pt of all above.  Pt states she will wait to hear from office regarding clearance received.

## 2018-12-12 NOTE — PROGRESS NOTES
Chronic patient Established Note (Follow up visit)      SUBJECTIVE:    Mariann Huff presents to the clinic for a follow-up appointment for low back pain and bilateral leg pain stopping at her calf's bilaterally. She is S/P Bilateral [id number] EPIDURAL STEROID INJECTION on 18 with 0% relief.  MRI shows L5-S1 demonstrates a moderate central disc herniation that abuts both right and left descending S1 nerve roots and compresses them against the adjacent facet.  There is also bilateral neural foraminal narrowing.  Discussed that I will schedule her for a caudal PAULO with catheter, discussed that she does not getting relief that I will refer her to Neurosurgery.  Since the last visit, Mariann Huff states the pain has been persistant. Current pain intensity is 8/10.    Pain Disability Index Review:  Last 3 PDI Scores 10/24/2018 2016   Pain Disability Index (PDI) 53 64       Pain Medications:     - Opioids: Ultram (Tramadol HCL)  - Adjuvant Medications: Ambien (Zolpidem), Neurontin (Gabapentin), Flexeril and Naproxen   - Anti-Coagulants: Aspirin  - Others: see medication list     Opioid Contract: no      report:  Reviewed and consistent with medication use as prescribed.     Pain Procedures:   18 Bilateral [id number] EPIDURAL STEROID INJECTION  17 Facet Injections @ L4-L5 and L5-S1   2/10/17 LUMBAR L4-L5 AND L5-S1 FACET STEROID INJECTION      Physical Therapy/Home Exercise: no     Imagin17 X-Ray Lumbar Spine Ap And Lateral     Narrative       Lumbar spine radiographs    Images: 3    Comparison: 16    Findings:    Alignment: There is minimal retrolisthesis at L5-S1.    Vertebrae: Vertebral body heights are maintained.  No suspicious lytic or blastic lesions.  Discs and facets: Severe disc height loss noted at L5-S1.  Lower lumbar facet arthropathy noted.  Soft tissues: Vascular calcification noted.                 Allergies: Review of patient's allergies indicates:  No  "Known Allergies    Current Medications:   Current Outpatient Medications   Medication Sig Dispense Refill    ACCU-CHEK EDIN PLUS METER [facility] [place]       albuterol (PROVENTIL/VENTOLIN HFA) 90 mcg/actuation inhaler Inhale 2 puffs into the lungs every 6 (six) hours as needed for Wheezing. 1 each 11    alirocumab (PRALUENT PEN) 75 mg/mL PnIj Inject 1 mL (75 mg total) into the skin every 14 (fourteen) days. 6 mL 3    amLODIPine (NORVASC) 10 MG tablet TAKE 1 TABLET (10 MG TOTAL) BY MOUTH ONCE DAILY. 30 tablet 6    aspirin 81 mg Tab Take 81 mg by mouth. 1 Tablet Oral Every day      atorvastatin (LIPITOR) 40 MG tablet TAKE 1 TABLET (40 MG TOTAL) BY MOUTH ONCE DAILY. 30 tablet 11    azithromycin (Z-PHONG) 250 MG tablet Take 2 tablets by mouth on day 1; Take 1 tablet by mouth on days 2-5 6 tablet 0    BD INSULIN PEN NEEDLE UF MINI 31 x 3/16 " Ndle USE 4 TIMES A  each 11    blood sugar diagnostic (ACCU-CHEK EDIN PLUS TEST STRP) Strp TEST BLOOD SUGARS 4 TIMES DAILY 150 strip 11    chlorthalidone (HYGROTEN) 50 MG Tab Take 1 tablet (50 mg total) by mouth once daily. 90 tablet 3    cyclobenzaprine (FLEXERIL) 10 MG tablet TAKE 1 TABLET BY MOUTH 3 TIMES A DAY AS NEEDED FOR MUSCLE SPASMS. NO WORKING OR DRIVING ON THIS MED 45 tablet 5    dulaglutide (TRULICITY) 1.5 mg/0.5 mL PnIj Inject 1.5 mg into the skin every 7 days. 4 Syringe 6    esomeprazole (NEXIUM) 40 MG capsule TAKE 1 CAPSULE (40 MG TOTAL) BY MOUTH BEFORE BREAKFAST. 90 capsule 3    fenofibrate micronized (LOFIBRA) 134 MG Cap TAKE 1 CAPSULE (134 MG TOTAL) BY MOUTH BEFORE BREAKFAST. 90 capsule 3    flash glucose scanning reader (FREESTYLE MISTI 14 DAY READER) Misc 1 each by Misc.(Non-Drug; Combo Route) route once daily. 2 each 11    flash glucose sensor (FREESTYLE MISTI 14 DAY SENSOR) Kit 1 each by Misc.(Non-Drug; Combo Route) route once daily. 2 kit 11    gabapentin (NEURONTIN) 100 MG capsule Take 2 capsules (200 mg total) by mouth every evening. 60 capsule " "11    insulin aspart U-100 (NOVOLOG FLEXPEN U-100 INSULIN) 100 unit/mL InPn pen INJECT 35 U AM, 45 U AT NOON, 35 U PM.150-200 +2, 201-250 +4, 251-300 +6, 301-350 +8, >350 +10 30 Syringe 1    insulin glargine, TOUJEO, (TOUJEO SOLOSTAR U-300 INSULIN) 300 unit/mL (1.5 mL) InPn pen Inject 50 Units into the skin 2 (two) times daily. 6 Syringe 6    INVOKANA 300 mg Tab tablet Take 1 tablet (300 mg total) by mouth once daily. 30 tablet 6    irbesartan (AVAPRO) 300 MG tablet Take 1 tablet (300 mg total) by mouth every evening. 90 tablet 3    lancets 30 gauge Misc       metoprolol succinate (TOPROL-XL) 100 MG 24 hr tablet TAKE 1 TABLET (100 MG TOTAL) BY MOUTH ONCE DAILY. 30 tablet 9    nitroGLYCERIN (NITROSTAT) 0.4 MG SL tablet Take one every 2-3 min till chestpain relief for 3 times and if still no relief, call MD or come to ED 30 tablet 11    omega-3 acid ethyl esters (LOVAZA) 1 gram capsule Take 1 g by mouth once daily.       pen needle, diabetic (BD ULTRA-FINE GRABIEL PEN NEEDLES) 32 gauge x 5/32" Ndle For use with insulin pens daily 4 times a day 100 each 11    prasugrel (EFFIENT) 10 mg Tab TAKE 1 TABLET (10 MG TOTAL) BY MOUTH ONCE DAILY. 30 tablet 10    tramadol (ULTRAM) 50 mg tablet Take 1 tablet (50 mg total) by mouth 3 (three) times daily as needed for Pain. 60 tablet 1    zolpidem (AMBIEN) 5 MG Tab Take 1 tablet (5 mg total) by mouth nightly as needed. 30 tablet 2     No current facility-administered medications for this visit.        REVIEW OF SYSTEMS:    GENERAL:  No weight loss, malaise or fevers.  HEENT:  Negative for frequent or significant headaches.  NECK:  Negative for lumps, goiter, pain and significant neck swelling.  RESPIRATORY:  Negative for cough, wheezing or shortness of breath.  CARDIOVASCULAR:  Negative for chest pain, leg swelling or palpitations.  GI:  Negative for abdominal discomfort, blood in stools or black stools or change in bowel habits.  MUSCULOSKELETAL:  See HPI.  SKIN:  Negative " for lesions, rash, and itching.  PSYCH:  POSITIVE sleep disturbance, mood disorder and recent psychosocial stressors.  HEMATOLOGY/LYMPHOLOGY:  Negative for prolonged bleeding, bruising easily or swollen nodes.  NEURO:   No history of headaches, syncope, paralysis, seizures or tremors.  All other reviewed and negative other than HPI.       Past Medical History:  Past Medical History:   Diagnosis Date    Anticoagulant long-term use     Asthma     Back pain     CAD (coronary artery disease)     s/p stentimg  (2), (1)    Carotid artery stenosis     Diabetes mellitus type 2 in obese     HTN (hypertension), benign     Hyperlipidemia     Keloid cicatrix     NPDR (nonproliferative diabetic retinopathy) 2015    NSTEMI (non-ST elevated myocardial infarction)     Nuclear sclerosis - Right Eye 3/18/2014    Primary localized osteoarthrosis, lower leg 2014    Sleep apnea        Past Surgical History:  Past Surgical History:   Procedure Laterality Date    CARDIAC CATHETERIZATION      cataract extraction left eye      cataracts      CATHETERIZATION, HEART, LEFT Left 2014    Performed by Wilman Kim [facility] at [facility] CATH LAB     SECTION, LOW TRANSVERSE      CORONARY ANGIOPLASTY      ESOPHAGOGASTRODUODENOSCOPY (EGD) N/A 2016    Performed by Gardenia Adamson MD at [facility] ENDO (4TH FLR)    EXCISION TURBINATE, SUBMUCOUS      HAND SURGERY Left     HAND SURGERY Right     torn ligament repair/ Dr. Yeboah    HYSTERECTOMY      Injection,steroid,epidural,transforaminal approach - Bilateral - S1 Bilateral 2018    Per[facility] by Tl [facility]breu [facility] at [facility] PAIN MGT    INSERTION-INTRAOCULAR LENS (IOL) Right 2015    Performed by Good Domingo MD at [facility] OR 1ST FLR    left foot surgery      left wrist surgery      NASAL SEPTUM SURGERY  5/7/15    PHACOEMULSIFICATION-ASPIRATION-CATARACT Right 2015    Performed by Good Domingo MD at [facility] OR 1ST FLR     RESECTION-TURBINATES (SMR) N/A 5/7/2015    Performed by Dileep Dubois III, MD at [facility] OR 2ND FLR    rt elbow surgery      S/P LAD COATED STENT  05/14/2010    6 total     S/P OM1 STENT  08/2003    SEPTOPLASTY N/A 5/7/2015    Performed by Dileep Dubois III, MD at [facility] OR 2ND FLR    SINUS SURGERY      F.E.S.S.    SINUS SURGERY FUNCTIONAL ENDOSCOPIC WITH NAVIGATION WITH MAXILLARIES, ETHMOIDS, LEFT SPHENOID, LEFT LOLY N/A 5/7/2015    Performed by Dileep Dubois III, MD at [facility] OR 2ND FLR    TUBAL LIGATION         Family History:  Family History   Problem Relation Age of Onset    Diabetes Mother     Heart disease Mother     Diabetes Father     Leukemia Father         leukemia    Heart attack Father     Diabetes Sister     Diabetes Brother     Diabetes Sister     No Known Problems Sister     No Known Problems Brother     No Known Problems Brother     No Known Problems Maternal Grandmother     No Known Problems Maternal Grandfather     No Known Problems Paternal Grandmother     No Known Problems Paternal Grandfather     No Known Problems Son     No Known Problems Son     No Known Problems Maternal Aunt     No Known Problems Maternal Uncle     No Known Problems Paternal Aunt     No Known Problems Paternal Uncle     Colon cancer Neg Hx     Inflammatory bowel disease Neg Hx     Melanoma Neg Hx     Psoriasis Neg Hx     Lupus Neg Hx     Eczema Neg Hx     Acne Neg Hx     Amblyopia Neg Hx     Blindness Neg Hx     Cancer Neg Hx     Cataracts Neg Hx     Glaucoma Neg Hx     Hypertension Neg Hx     Macular degeneration Neg Hx     Retinal detachment Neg Hx     Strabismus Neg Hx     Stroke Neg Hx     Thyroid disease Neg Hx     Heart failure Neg Hx     Hyperlipidemia Neg Hx        Social History:  Social History     Socioeconomic History    Marital status:      Spouse name: Shamir    Number of children: 2    Years of education: Not on file    Highest education level: Not  on file   Social Needs    Financial resource strain: Not on file    Food insecurity - worry: Not on file    Food insecurity - inability: Not on file    Transportation needs - medical: Not on file    Transportation needs - non-medical: Not on file   Occupational History    Occupation: cafeteria     Employer:  [employer]     Employer: [employer]     Employer: [employer]   Tobacco Use    Smoking status: Never Smoker    Smokeless tobacco: Never Used   Substance and Sexual Activity    Alcohol use: No     Alcohol/week: 0.0 oz    Drug use: No    Sexual activity: Yes     Partners: Male     Birth control/protection: Post-menopausal     Comment:    Other Topics Concern    Are you pregnant or think you may be? No    Breast-feeding No   Social History Narrative    . 2 children.        OBJECTIVE:    There were no vitals taken for this visit.    PHYSICAL EXAMINATION:    General appearance: Well appearing, in no acute distress, alert and oriented x3.  Psych:  Mood and affect appropriate.  Skin: Skin color, texture, turgor normal, no rashes or lesions, in both upper and lower body.  Head/face:  Atraumatic, normocephalic. No palpable lymph nodes  Neck: No pain to palpation over the cervical paraspinous muscles. Spurling Negative. No pain with neck flexion, extension, or lateral flexion. .  Cor: RRR  Pulm: CTA  GI: Abdomen soft and non-tender.  Back: Straight leg raising in the sitting and supine positions is + to radicular pain bilaterally  +  pain to palpation over the L-spine or costovertebral angles. Normal range of motion without pain reproduction. + Facet Loading Bilaterally. Positive FABERE, Yeoman's and Galensen test on the both side.  Extremities: Peripheral joint ROM is full and pain free without obvious instability or laxity in all four extremities. No deformities, edema, or skin discoloration. Good capillary refill.  Musculoskeletal: Shoulder, hip,  sacroiliac and knee provocative maneuvers are negative. Bilateral upper and lower extremity strength is normal and symmetric.  No atrophy or tone abnormalities are noted.  Neuro: Bilateral upper and lower extremity coordination and muscle stretch reflexes are physiologic and symmetric.  Plantar response are downgoing. No loss of sensation is noted.  Gait:  Antalgic    ASSESSMENT: 58 y.o. year old female with low back and bilateral leg  pain, consistent with      Diagnosis:    1. Lumbar radiculopathy     2. Herniated lumbar intervertebral disc     3. DDD (degenerative disc disease), lumbosacral     4. Lumbosacral radiculopathy     5. Chronic pain syndrome           PLAN:     - I have stressed the importance of physical activity and a home exercise plan to help with pain and improve health.  - Patient can continue with medications for now since they are providing benefits, using them appropriately, and without side effects.  - Counseled patient regarding the importance of activity modification, constant sleeping habits and physical therapy.  - scheduled for caudal PAULO with Racz catheter  - I have explained the risks, benefits, and alternatives of the procedure in detail. The patient voices understanding and all questions have been answered. The patient agrees to proceed as planned. Written Consent obtained.   -referring neural surgery if injection does not provide any relief  -RTC 2-3 weeks following procedure.  -The above plan and management options were discussed at length with patient. Patient is in agreement with the above and verbalized understanding. Dr. Abreu   was consulted on this patient  and agrees with this plan.    ANJALI Vela  Interventional Pain Management      12/12/2018     Disclaimer: This note was partly generated using dictation software which may occasionally result in transcription errors.

## 2018-12-14 ENCOUNTER — TELEPHONE (OUTPATIENT)
Dept: PAIN MEDICINE | Facility: CLINIC | Age: 58
End: 2018-12-14

## 2018-12-14 DIAGNOSIS — M54.16 LUMBAR RADICULOPATHY: Primary | ICD-10-CM

## 2018-12-14 NOTE — TELEPHONE ENCOUNTER
----- Message from Cynthia Smith MA sent at 12/13/2018 10:51 AM CST -----      ----- Message -----  From: Adelaide Aguilar MD  Sent: 12/13/2018  10:38 AM  To: Cynthia Smith MA    I cleared her already  ----- Message -----  From: Jessica Blair  Sent: 12/12/2018  11:12 AM  To: Adelaide Aguilar MD        ----- Message -----  From: Cecelia Mcnamara LPN  Sent: 12/12/2018   9:32 AM  To: Adelaide Aguilar MD, #    Pt is scheduled to have a Caudal PAULO per Dr Bentley on 12/20/18.  We are requesting clearance for pt to hold Effient and Aspirin for 7 days prior to procedure, which would mean that pt would need to begin holding tomorrow, 12/13/18.  Please advise.  Thank you.

## 2018-12-14 NOTE — TELEPHONE ENCOUNTER
"Spoke to pt to confirm that clearance has been received per Dr Aguilar for pt to hold Effient 7 days prior to procedure.  Pt states that she began holding herself yesterday prior to clearance being received and states "I knew she was going to let me hold it."  Advised pt to begin holding Aspirin 3 days prior to procedure.  Pt verbalized understanding.    "

## 2018-12-19 ENCOUNTER — TELEPHONE (OUTPATIENT)
Dept: PAIN MEDICINE | Facility: CLINIC | Age: 58
End: 2018-12-19

## 2018-12-19 ENCOUNTER — TELEPHONE (OUTPATIENT)
Dept: PAIN MEDICINE | Facility: HOSPITAL | Age: 58
End: 2018-12-19

## 2018-12-19 NOTE — TELEPHONE ENCOUNTER
Returned call pt stated that she need to r/s  Tomorrows procedure . R/s her to 1/10/19 pt verbalized understanding    ----- Message from Mercedes Brown sent at 12/19/2018  3:30 PM CST -----  Contact: self / [phone]  Patient is requesting a call back regarding, she needs to reschedule her procedure. Please advise

## 2018-12-19 NOTE — TELEPHONE ENCOUNTER
Praluent initial follow up.  Name and  confirmed.  Spoke to patient.    Start date confirmed 12/10/18.  Patient was at [facility] and could not talk long.  She self injected Praluent without any difficulties.  She has one dose on hand in the fridge, next injection on 18.   Patient reports experiencing no side effects since beginning therapy.  [facility] will continue reaching out to patient monthly to arrange refills.  Advised to call [facility] and provider if any issues arise.

## 2018-12-28 ENCOUNTER — TELEPHONE (OUTPATIENT)
Dept: PHARMACY | Facility: CLINIC | Age: 58
End: 2018-12-28

## 2019-01-07 ENCOUNTER — TELEPHONE (OUTPATIENT)
Dept: PAIN MEDICINE | Facility: CLINIC | Age: 59
End: 2019-01-07

## 2019-01-09 ENCOUNTER — TELEPHONE (OUTPATIENT)
Dept: PAIN MEDICINE | Facility: HOSPITAL | Age: 59
End: 2019-01-09

## 2019-01-09 NOTE — TELEPHONE ENCOUNTER
informed patient of procedure and arrival time, npo past midnight, and having a  for the ride home. Patient states she has stopped blood thinners as instructed by MD.

## 2019-01-10 RX ORDER — METOPROLOL SUCCINATE 100 MG/1
TABLET, EXTENDED RELEASE ORAL
Qty: 30 TABLET | Refills: 11 | Status: ON HOLD | OUTPATIENT
Start: 2019-01-10 | End: 2019-07-02 | Stop reason: HOSPADM

## 2019-01-21 ENCOUNTER — TELEPHONE (OUTPATIENT)
Dept: CARDIOLOGY | Facility: CLINIC | Age: 59
End: 2019-01-21

## 2019-01-21 ENCOUNTER — LAB VISIT (OUTPATIENT)
Dept: LAB | Facility: HOSPITAL | Age: 59
End: 2019-01-21
Attending: INTERNAL MEDICINE
Payer: MEDICARE

## 2019-01-21 DIAGNOSIS — G47.33 OBSTRUCTIVE SLEEP APNEA SYNDROME: Chronic | ICD-10-CM

## 2019-01-21 DIAGNOSIS — I65.23 CAROTID STENOSIS, BILATERAL: Chronic | ICD-10-CM

## 2019-01-21 DIAGNOSIS — Z95.5 S/P CORONARY ARTERY STENT PLACEMENT: ICD-10-CM

## 2019-01-21 DIAGNOSIS — E11.51 TYPE 2 DIABETES MELLITUS WITH DIABETIC PERIPHERAL ANGIOPATHY WITHOUT GANGRENE, UNSPECIFIED WHETHER LONG TERM INSULIN USE: Chronic | ICD-10-CM

## 2019-01-21 DIAGNOSIS — I25.10 CORONARY ARTERY DISEASE DUE TO LIPID RICH PLAQUE: Chronic | ICD-10-CM

## 2019-01-21 DIAGNOSIS — I15.2 HYPERTENSION ASSOCIATED WITH DIABETES: Chronic | ICD-10-CM

## 2019-01-21 DIAGNOSIS — T82.855S CORONARY STENT RESTENOSIS, SEQUELA: ICD-10-CM

## 2019-01-21 DIAGNOSIS — K76.0 FATTY LIVER DISEASE, NONALCOHOLIC: ICD-10-CM

## 2019-01-21 DIAGNOSIS — E11.59 HYPERTENSION ASSOCIATED WITH DIABETES: Chronic | ICD-10-CM

## 2019-01-21 DIAGNOSIS — E78.2 MIXED HYPERLIPIDEMIA: Chronic | ICD-10-CM

## 2019-01-21 DIAGNOSIS — I25.83 CORONARY ARTERY DISEASE DUE TO LIPID RICH PLAQUE: Chronic | ICD-10-CM

## 2019-01-21 LAB
ALBUMIN SERPL BCP-MCNC: 3.8 G/DL
ALP SERPL-CCNC: 62 U/L
ALT SERPL W/O P-5'-P-CCNC: 14 U/L
AST SERPL-CCNC: 17 U/L
BILIRUB DIRECT SERPL-MCNC: 0.2 MG/DL
BILIRUB SERPL-MCNC: 0.6 MG/DL
CHOLEST SERPL-MCNC: 202 MG/DL
CHOLEST/HDLC SERPL: 4.2 {RATIO}
HDLC SERPL-MCNC: 48 MG/DL
HDLC SERPL: 23.8 %
LDLC SERPL CALC-MCNC: 134 MG/DL
NONHDLC SERPL-MCNC: 154 MG/DL
PROT SERPL-MCNC: 7.9 G/DL
TRIGL SERPL-MCNC: 100 MG/DL

## 2019-01-21 PROCEDURE — 80076 HEPATIC FUNCTION PANEL: CPT

## 2019-01-21 PROCEDURE — 36415 COLL VENOUS BLD VENIPUNCTURE: CPT | Mod: PO

## 2019-01-21 PROCEDURE — 80061 LIPID PANEL: CPT

## 2019-01-21 NOTE — TELEPHONE ENCOUNTER
Pt notified, verbalized understanding. Pt stated that she will send in paperwork for assistance for the medication.

## 2019-01-21 NOTE — TELEPHONE ENCOUNTER
----- Message from Adelaide Aguilar MD sent at 1/21/2019  1:47 PM CST -----  Please inform the patient that lipids are improving  But not yet at goal.  Please check if patient is taking Praluent as prescribed.

## 2019-01-21 NOTE — TELEPHONE ENCOUNTER
I spoke w/Yousif at [facility] and he stated that pt's copay for the medication is $294 and since it's a new year she may have to meet her deductible and this could be the reason why the medication is $600. He also stated that they spoke w/pt and stated that they are waiting for her to send in the paperwork. Dr. Aguilar made aware.

## 2019-01-21 NOTE — TELEPHONE ENCOUNTER
Pt notified, verbalized understanding. Pt stated that she has not has not take Praluent since the first week of Jan. 2019. Pt stated that the medication is expensive. Pt stated that for her first supply she paid $54.00 and for the next supply they told her that it would be around $600.00. Please advise.

## 2019-02-06 ENCOUNTER — TELEPHONE (OUTPATIENT)
Dept: PAIN MEDICINE | Facility: CLINIC | Age: 59
End: 2019-02-06

## 2019-02-06 DIAGNOSIS — M54.16 LUMBAR RADICULITIS: Primary | ICD-10-CM

## 2019-02-07 ENCOUNTER — HOSPITAL ENCOUNTER (OUTPATIENT)
Facility: HOSPITAL | Age: 59
Discharge: HOME | End: 2019-02-07
Attending: PAIN MEDICINE | Admitting: PAIN MEDICINE
Payer: MEDICARE

## 2019-02-07 VITALS
HEIGHT: 64 IN | SYSTOLIC BLOOD PRESSURE: 114 MMHG | OXYGEN SATURATION: 100 % | BODY MASS INDEX: 26.8 KG/M2 | DIASTOLIC BLOOD PRESSURE: 62 MMHG | RESPIRATION RATE: 16 BRPM | HEART RATE: 83 BPM | WEIGHT: 157 LBS | TEMPERATURE: 98 F

## 2019-02-07 DIAGNOSIS — G89.29 CHRONIC PAIN: ICD-10-CM

## 2019-02-07 DIAGNOSIS — M54.16 LUMBAR RADICULOPATHY: Primary | ICD-10-CM

## 2019-02-07 PROCEDURE — 62327 PR INJ/INFUS, LUMBAR/SACRAL INDWELL CATH, W/GUIDANCE: ICD-10-PCS | Mod: ,,, | Performed by: PAIN MEDICINE

## 2019-02-07 PROCEDURE — 62327 NJX INTERLAMINAR LMBR/SAC: CPT | Mod: ,,, | Performed by: PAIN MEDICINE

## 2019-02-07 PROCEDURE — 25000003 PHARM REV CODE 250: Performed by: PAIN MEDICINE

## 2019-02-07 PROCEDURE — 25500020 PHARM REV CODE 255: Performed by: PAIN MEDICINE

## 2019-02-07 PROCEDURE — 99152 PR MOD CONSCIOUS SEDATION, SAME PHYS, 5+ YRS, FIRST 15 MIN: ICD-10-PCS | Mod: ,,, | Performed by: PAIN MEDICINE

## 2019-02-07 PROCEDURE — 63600175 PHARM REV CODE 636 W HCPCS: Performed by: PAIN MEDICINE

## 2019-02-07 PROCEDURE — 62323 NJX INTERLAMINAR LMBR/SAC: CPT | Performed by: PAIN MEDICINE

## 2019-02-07 PROCEDURE — 99152 MOD SED SAME PHYS/QHP 5/>YRS: CPT | Performed by: PAIN MEDICINE

## 2019-02-07 PROCEDURE — 99152 MOD SED SAME PHYS/QHP 5/>YRS: CPT | Mod: ,,, | Performed by: PAIN MEDICINE

## 2019-02-07 RX ORDER — FENTANYL CITRATE 50 UG/ML
INJECTION, SOLUTION INTRAMUSCULAR; INTRAVENOUS
Status: DISCONTINUED | OUTPATIENT
Start: 2019-02-07 | End: 2019-02-07 | Stop reason: HOSPADM

## 2019-02-07 RX ORDER — INDOMETHACIN 25 MG/1
CAPSULE ORAL
Status: DISCONTINUED | OUTPATIENT
Start: 2019-02-07 | End: 2019-02-07 | Stop reason: HOSPADM

## 2019-02-07 RX ORDER — METHYLPREDNISOLONE ACETATE 40 MG/ML
INJECTION, SUSPENSION INTRA-ARTICULAR; INTRALESIONAL; INTRAMUSCULAR; SOFT TISSUE
Status: DISCONTINUED | OUTPATIENT
Start: 2019-02-07 | End: 2019-02-07 | Stop reason: HOSPADM

## 2019-02-07 RX ORDER — LIDOCAINE HYDROCHLORIDE 10 MG/ML
INJECTION, SOLUTION EPIDURAL; INFILTRATION; INTRACAUDAL; PERINEURAL
Status: DISCONTINUED | OUTPATIENT
Start: 2019-02-07 | End: 2019-02-07 | Stop reason: HOSPADM

## 2019-02-07 RX ORDER — SODIUM CHLORIDE 9 MG/ML
500 INJECTION, SOLUTION INTRAVENOUS CONTINUOUS
Status: DISCONTINUED | OUTPATIENT
Start: 2019-02-07 | End: 2019-02-07 | Stop reason: HOSPADM

## 2019-02-07 RX ORDER — MIDAZOLAM HYDROCHLORIDE 1 MG/ML
INJECTION INTRAMUSCULAR; INTRAVENOUS
Status: DISCONTINUED | OUTPATIENT
Start: 2019-02-07 | End: 2019-02-07 | Stop reason: HOSPADM

## 2019-02-07 RX ORDER — LIDOCAINE HYDROCHLORIDE 10 MG/ML
1 INJECTION, SOLUTION EPIDURAL; INFILTRATION; INTRACAUDAL; PERINEURAL ONCE
Status: DISCONTINUED | OUTPATIENT
Start: 2019-02-07 | End: 2019-02-07 | Stop reason: HOSPADM

## 2019-02-07 RX ADMIN — SODIUM CHLORIDE 500 ML: 0.9 INJECTION, SOLUTION INTRAVENOUS at 10:02

## 2019-02-07 NOTE — INTERVAL H&P NOTE
The patient has been examined and the H&P has been reviewed:    I concur with the findings and no changes have occurred since H&P was written.    Anesthesia/Surgery risks, benefits and alternative options discussed and understood by patient/family.          Active Hospital Problems    Diagnosis  POA    Lumbar radiculopathy [M54.16]  Unknown    Chronic pain [G89.29]  Yes      Resolved Hospital Problems   No resolved problems to display.

## 2019-02-07 NOTE — DISCHARGE INSTRUCTIONS
Home Care Instructions Pain Management:    1.  DIET:    You may resume your normal diet today.    2.  BATHING:    You may shower with luke warm water.    3.  DRESSING:    You may remove your bandage today.    4.  ACTIVITY LEVEL:      You may resume your normal activities 24 hours after your procedure.    5.  MEDICATIONS:    You may resume your normal medications today.    6.  SPECIAL INSTRUCTIONS:    No heat to the injection site for 24 hours including bath or shower, heating pad, moist heat or hot tubs.    Use an ice pack to the injection site for any pain or discomfort.  Apply ice packs for 20 minute intervals as needed.    If you have received any sedatives by mouth today, you can not drive for 12 hours.    If you have received sedation through an IV, you can not drive for 24 hours.    PLEASE CALL YOUR DOCTOR FOR THE FOLLOWIN.  Redness or swelling around the injection site.  2.  Fever of 101 degrees.  3.  Drainage (pus) from the injection site.  4.  For any continuous bleeding (some dried blood over the incision is normal.)    FOR EMERGENCIES:    If any unusual problems or difficulties occur during clinic hours, call [phone] or dial [phone].    Follow up with with your physician in 2-3 weeks.

## 2019-02-07 NOTE — OP NOTE
"Procedure Note    Pre-operative Diagnosis: Lumbar radiculopathy  Post-operative Diagnosis: Lumbar radiculopathy  Procedure Date: 02/07/2019  Procedure:  (1) Caudal Epidural Steroid Injection with Epidural Catheter    (2) Intraoperative fluoroscopy    (3) IV Moderate Sedation (Midazolam 2 mg, Fentanyl 25 mcg)      Indications: To alleviate pain and suffering, and reduce functional impairment associated with lumbar radiculopathy.      The patients history and physical exam were reviewed. The risks, benefits and alternatives to the procedure were discussed, and all questions were answered to the patients satisfaction. Special attention was given to her bleeding risk during the consenting process due to her use of warfarin, which was not stopped for this procedure.  The patient agreed to proceed, and written informed consent was verified.    Procedure in Detail: The patient was brought into the procedure room and placed in the prone position on the fluoroscopy table. The area of the sacrum and coccyx was prepped with Chloraprep and draped in a sterile manner. The sacral hiatus was identified by palpation and lateral fluoroscopy.  The skin and subcutaneous tissues were anesthetized with 1 mL of Lidocaine 1% using a 25G 1.25" needle.  A 16G 3.5" Touhy needle was inserted inferiorly to the sacral hiatus with cephalad angulation and advanced into through the sacro-coxxygeal ligament.  Entry into the epidural space was confirmed on latera fluoroscopy by advancement of a 19G styletted catheter through the needle and into the epidural space.  Live and intermittent AP fluoroscopy was then used to guide the catheter to the level of L5.  Injection of 1 mL of iodinated contrast revealed appropriate epidurogram without evidence of direct vascular uptake.    A 5 mL containing Depomedrol 40 mg (1 mL) and saline (4mL) was injected slowly and without resistance.  The needle and catheter were removed and a bandage applied to puncture " site.    Disposition: The patient tolerated the procedure well, and there were no apparent complications. Vital signs remained stable throughout the procedure. The patient was taken to the recovery area where written discharge instructions for the procedure were given.     EBL: nil    Findings: Catheter and needle placement were consistent with successful caudal epidural steroid injection with catheter.    Follow-up: in clinic as scheduled      Dave Bentley Jr, MD  Interventional Pain Medicine / Anesthesiology

## 2019-02-07 NOTE — PROGRESS NOTES
Discharge instructions reviewed with patient. Questions answered. Verbalized understanding, no further questions at this time. IV d/c'd with tip intact. Procedure site is CDI. VSS. No acute distress noted. Staff at bedside to help pt change. Wheeled to DC area by staff. Home with family in private vehicle.

## 2019-02-07 NOTE — TELEPHONE ENCOUNTER
FYI: Patient is over the income limit for the Praluent Pass Program. Unfortuneatly there is no other Assistance that the patient will qualify for at this time.Sending staff message to Dr.Yvonne Aguilar regarding failure to find financial help for patient.

## 2019-02-07 NOTE — DISCHARGE SUMMARY
"[facility]  Discharge Note  Short Stay     Admit Date: 2/7/2019    Discharge Date: 2/7/2019     Attending Physician: Dave Bentley Jr, MD    Diagnoses:  Active Hospital Problems    Diagnosis  POA    *Lumbar radiculopathy [M54.16]  Yes    Chronic pain [G89.29]  Yes      Resolved Hospital Problems   No resolved problems to display.     Discharged Condition: Good     Hospital Course: Patient was admitted for an outpatient interventional pain management procedure and tolerated the procedure well with no complications.     Final Diagnoses: Same as principal problem.     Disposition: Home or Self Care     Follow up/Patient Instructions:    Follow-up Information     Dave Bentley Jr, MD. Go in 2 weeks.    Specialty:  Pain Medicine  Why:  Post-procedural Follow Up As Scheduled, Call to make an appointment if you do not have one  Contact information:  [address]  [address]  [place] [address]  [phone]                   Reconciled Medications:     Medication List      CONTINUE taking these medications    ACCU-CHEK EDIN PLUS METER Misc  Generic drug:  blood-glucose meter     albuterol 90 mcg/actuation inhaler  Commonly known as:  PROVENTIL/VENTOLIN HFA  Inhale 2 puffs into the lungs every 6 (six) hours as needed for Wheezing.     amLODIPine 10 MG tablet  Commonly known as:  NORVASC  TAKE 1 TABLET (10 MG TOTAL) BY MOUTH ONCE DAILY.     aspirin 81 mg Tab  Take 81 mg by mouth. 1 Tablet Oral Every day     atorvastatin 40 MG tablet  Commonly known as:  LIPITOR  TAKE 1 TABLET (40 MG TOTAL) BY MOUTH ONCE DAILY.     azithromycin 250 MG tablet  Commonly known as:  Z-PHONG  Take 2 tablets by mouth on day 1; Take 1 tablet by mouth on days 2-5     BD ULTRA-FINE MINI PEN NEEDLE 31 gauge x 3/16" Ndle  Generic drug:  pen needle, diabetic  USE 4 TIMES A DAY     blood sugar diagnostic Strp  Commonly known as:  ACCU-CHEK EDIN PLUS TEST STRP  TEST BLOOD SUGARS 4 TIMES DAILY     chlorthalidone 50 MG Tab  Commonly " known as:  HYGROTEN  Take 1 tablet (50 mg total) by mouth once daily.     cyclobenzaprine 10 MG tablet  Commonly known as:  FLEXERIL  TAKE 1 TABLET BY MOUTH 3 TIMES A DAY AS NEEDED FOR MUSCLE SPASMS. NO WORKING OR DRIVING ON THIS MED     dulaglutide 1.5 mg/0.5 mL Pnij  Commonly known as:  TRULICITY  Inject 1.5 mg into the skin every 7 days.     esomeprazole 40 MG capsule  Commonly known as:  NEXIUM  TAKE 1 CAPSULE (40 MG TOTAL) BY MOUTH BEFORE BREAKFAST.     fenofibrate micronized 134 MG Cap  Commonly known as:  LOFIBRA  TAKE 1 CAPSULE (134 MG TOTAL) BY MOUTH BEFORE BREAKFAST.     flash glucose scanning reader Misc  Commonly known as:  FREESTYLE MISTI 14 DAY READER  1 each by Misc.(Non-Drug; Combo Route) route once daily.     flash glucose sensor Kit  Commonly known as:  FREESTYLE MISTI 14 DAY SENSOR  1 each by Misc.(Non-Drug; Combo Route) route once daily.     gabapentin 100 MG capsule  Commonly known as:  NEURONTIN  Take 2 capsules (200 mg total) by mouth every evening.     insulin aspart U-100 100 unit/mL Inpn pen  Commonly known as:  NovoLOG Flexpen U-100 Insulin  INJECT 35 U AM, 45 U AT NOON, 35 U PM.150-200 +2, 201-250 +4, 251-300 +6, 301-350 +8, >350 +10     insulin glargine (TOUJEO) 300 unit/mL (1.5 mL) Inpn pen  Commonly known as:  TOUJEO SOLOSTAR U-300 INSULIN  Inject 50 Units into the skin 2 (two) times daily.     INVOKANA 300 mg Tab tablet  Generic drug:  canagliflozin  Take 1 tablet (300 mg total) by mouth once daily.     irbesartan 300 MG tablet  Commonly known as:  AVAPRO  Take 1 tablet (300 mg total) by mouth every evening.     lancets 30 gauge Misc     metoprolol succinate 100 MG 24 hr tablet  Commonly known as:  TOPROL-XL  TAKE 1 TABLET (100 MG TOTAL) BY MOUTH ONCE DAILY.     nitroGLYCERIN 0.4 MG SL tablet  Commonly known as:  NITROSTAT  Take one every 2-3 min till chestpain relief for 3 times and if still no relief, call MD or come to ED     omega-3 acid ethyl esters 1 gram capsule  Commonly known  "as:  LOVAZA  Take 1 g by mouth once daily.     pen needle, diabetic 32 gauge x 5/32" Ndle  Commonly known as:  BD ULTRA-FINE GRABIEL PEN NEEDLE  For use with insulin pens daily 4 times a day     PRALUENT PEN 75 mg/mL Pnij  Generic drug:  alirocumab  Inject 1 mL (75 mg total) into the skin every 14 (fourteen) days.     prasugrel 10 mg Tab  Commonly known as:  EFFIENT  TAKE 1 TABLET (10 MG TOTAL) BY MOUTH ONCE DAILY.     traMADol 50 mg tablet  Commonly known as:  ULTRAM  Take 1 tablet (50 mg total) by mouth 3 (three) times daily as needed for Pain.     zolpidem 5 MG Tab  Commonly known as:  AMBIEN  Take 1 tablet (5 mg total) by mouth nightly as needed.           Discharge Procedure Orders (must include Diet, Follow-up, Activity)   Call MD for:  temperature >100.4     Call MD for:  severe uncontrolled pain     Call MD for:  redness, tenderness, or signs of infection (pain, swelling, redness, odor or green/yellow discharge around incision site)     Call MD for:  difficulty breathing or increased cough     Call MD for:  severe persistent headache     Call MD for:  worsening rash     Remove dressing in 24 hours       Dave Bentley Jr, MD  Interventional Pain Medicine / Anesthesiology    "

## 2019-02-08 LAB — POCT GLUCOSE: 131 MG/DL (ref 70–110)

## 2019-02-19 ENCOUNTER — LAB VISIT (OUTPATIENT)
Dept: LAB | Facility: HOSPITAL | Age: 59
End: 2019-02-19
Attending: INTERNAL MEDICINE
Payer: MEDICARE

## 2019-02-19 DIAGNOSIS — Z79.4 TYPE 2 DIABETES MELLITUS WITH DIABETIC PERIPHERAL ANGIOPATHY WITHOUT GANGRENE, WITH LONG-TERM CURRENT USE OF INSULIN: Chronic | ICD-10-CM

## 2019-02-19 DIAGNOSIS — E11.51 TYPE 2 DIABETES MELLITUS WITH DIABETIC PERIPHERAL ANGIOPATHY WITHOUT GANGRENE, WITH LONG-TERM CURRENT USE OF INSULIN: Chronic | ICD-10-CM

## 2019-02-19 LAB
ESTIMATED AVG GLUCOSE: 263 MG/DL
HBA1C MFR BLD HPLC: 10.8 %

## 2019-02-19 PROCEDURE — 36415 COLL VENOUS BLD VENIPUNCTURE: CPT | Mod: PO

## 2019-02-19 PROCEDURE — 83036 HEMOGLOBIN GLYCOSYLATED A1C: CPT

## 2019-02-20 ENCOUNTER — OFFICE VISIT (OUTPATIENT)
Dept: PAIN MEDICINE | Facility: CLINIC | Age: 59
End: 2019-02-20
Payer: MEDICARE

## 2019-02-20 VITALS
SYSTOLIC BLOOD PRESSURE: 142 MMHG | WEIGHT: 158.94 LBS | HEART RATE: 105 BPM | BODY MASS INDEX: 27.28 KG/M2 | DIASTOLIC BLOOD PRESSURE: 71 MMHG

## 2019-02-20 DIAGNOSIS — M51.26 HERNIATED LUMBAR INTERVERTEBRAL DISC: Primary | ICD-10-CM

## 2019-02-20 DIAGNOSIS — M54.17 LUMBOSACRAL RADICULOPATHY: ICD-10-CM

## 2019-02-20 DIAGNOSIS — M51.37 DDD (DEGENERATIVE DISC DISEASE), LUMBOSACRAL: ICD-10-CM

## 2019-02-20 DIAGNOSIS — G89.4 CHRONIC PAIN SYNDROME: ICD-10-CM

## 2019-02-20 PROCEDURE — 3078F DIAST BP <80 MM HG: CPT | Mod: CPTII,S$GLB,, | Performed by: NURSE PRACTITIONER

## 2019-02-20 PROCEDURE — 99214 OFFICE O/P EST MOD 30 MIN: CPT | Mod: S$GLB,,, | Performed by: NURSE PRACTITIONER

## 2019-02-20 PROCEDURE — 99214 PR OFFICE/OUTPT VISIT, EST, LEVL IV, 30-39 MIN: ICD-10-PCS | Mod: S$GLB,,, | Performed by: NURSE PRACTITIONER

## 2019-02-20 PROCEDURE — 99999 PR PBB SHADOW E&M-EST. PATIENT-LVL V: ICD-10-PCS | Mod: PBBFAC,,, | Performed by: NURSE PRACTITIONER

## 2019-02-20 PROCEDURE — 3008F PR BODY MASS INDEX (BMI) DOCUMENTED: ICD-10-PCS | Mod: CPTII,S$GLB,, | Performed by: NURSE PRACTITIONER

## 2019-02-20 PROCEDURE — 99999 PR PBB SHADOW E&M-EST. PATIENT-LVL V: CPT | Mod: PBBFAC,,, | Performed by: NURSE PRACTITIONER

## 2019-02-20 PROCEDURE — 3077F PR MOST RECENT SYSTOLIC BLOOD PRESSURE >= 140 MM HG: ICD-10-PCS | Mod: CPTII,S$GLB,, | Performed by: NURSE PRACTITIONER

## 2019-02-20 PROCEDURE — 3077F SYST BP >= 140 MM HG: CPT | Mod: CPTII,S$GLB,, | Performed by: NURSE PRACTITIONER

## 2019-02-20 PROCEDURE — 3008F BODY MASS INDEX DOCD: CPT | Mod: CPTII,S$GLB,, | Performed by: NURSE PRACTITIONER

## 2019-02-20 PROCEDURE — 3078F PR MOST RECENT DIASTOLIC BLOOD PRESSURE < 80 MM HG: ICD-10-PCS | Mod: CPTII,S$GLB,, | Performed by: NURSE PRACTITIONER

## 2019-02-20 NOTE — PROGRESS NOTES
Chronic patient Established Note (Follow up visit)      SUBJECTIVE:    Mariann Huff presents to the clinic for a follow-up appointment for low back and bilateral leg pain. She is S/P Caudal Epidural Steroid Injection with Epidural Catheter on 19 with 100% relief for a couple of days and pain returned.  Patient status post multiple injections with our office with minimal to no relief.  2016 MRI shows that her main issue is at the L5-S1 level she has moderate central disc herniation abuts the right and left descending S1 nerve roots and compresses them against the adjacent facet she also has bilateral neural foraminal narrowing I will recommend we get an updated lumbar MRI, also refer her to Neurosurgery for further assessment of her pain we also discussed SCS therapy to consider for the future.  Since the last visit, Mariann Huff states the pain has been worsening. Current pain intensity is 6/10.    Pain Disability Index Review:  Last 3 PDI Scores 2018   Pain Disability Index (PDI) 36 50 63       Pain Medications:     - Opioids: Ultram (Tramadol HCL)  - Adjuvant Medications: Ambien (Zolpidem), Neurontin (Gabapentin), Flexeril and Naproxen   - Anti-Coagulants: Aspirin  - Others: see medication list     Opioid Contract: no      report:  Reviewed and consistent with medication use as prescribed.     Pain Procedures: 19 Caudal Epidural Steroid Injection with Epidural Catheter   18 Bilateral [id number] EPIDURAL STEROID INJECTION  17 Facet Injections @ L4-L5 and L5-S1   2/10/17 LUMBAR L4-L5 AND L5-S1 FACET STEROID INJECTION      Physical Therapy/Home Exercise: no     Imagin17 X-Ray Lumbar Spine Ap And Lateral     Narrative       Lumbar spine radiographs    Images: 3    Comparison: 16    Findings:    Alignment: There is minimal retrolisthesis at L5-S1.    Vertebrae: Vertebral body heights are maintained.  No suspicious lytic or blastic lesions.  Discs  "and facets: Severe disc height loss noted at L5-S1.  Lower lumbar facet arthropathy noted.  Soft tissues: Vascular calcification noted.                     Allergies: Review of patient's allergies indicates:  No Known Allergies        Allergies: Review of patient's allergies indicates:  No Known Allergies    Current Medications:   Current Outpatient Medications   Medication Sig Dispense Refill    ACCU-CHEK EDIN PLUS METER [facility] [place]       albuterol (PROVENTIL/VENTOLIN HFA) 90 mcg/actuation inhaler Inhale 2 puffs into the lungs every 6 (six) hours as needed for Wheezing. 1 each 11    alirocumab (PRALUENT PEN) 75 mg/mL PnIj Inject 1 mL (75 mg total) into the skin every 14 (fourteen) days. 6 mL 3    amLODIPine (NORVASC) 10 MG tablet TAKE 1 TABLET (10 MG TOTAL) BY MOUTH ONCE DAILY. 30 tablet 6    aspirin 81 mg Tab Take 81 mg by mouth. 1 Tablet Oral Every day      atorvastatin (LIPITOR) 40 MG tablet TAKE 1 TABLET (40 MG TOTAL) BY MOUTH ONCE DAILY. 30 tablet 11    azithromycin (Z-PHONG) 250 MG tablet Take 2 tablets by mouth on day 1; Take 1 tablet by mouth on days 2-5 6 tablet 0    BD INSULIN PEN NEEDLE UF MINI 31 x 3/16 " Ndle USE 4 TIMES A  each 11    blood sugar diagnostic (ACCU-CHEK EDIN PLUS TEST STRP) Strp TEST BLOOD SUGARS 4 TIMES DAILY 150 strip 11    chlorthalidone (HYGROTEN) 50 MG Tab Take 1 tablet (50 mg total) by mouth once daily. 90 tablet 3    dulaglutide (TRULICITY) 1.5 mg/0.5 mL PnIj Inject 1.5 mg into the skin every 7 days. 4 Syringe 6    esomeprazole (NEXIUM) 40 MG capsule TAKE 1 CAPSULE (40 MG TOTAL) BY MOUTH BEFORE BREAKFAST. 90 capsule 3    fenofibrate micronized (LOFIBRA) 134 MG Cap TAKE 1 CAPSULE (134 MG TOTAL) BY MOUTH BEFORE BREAKFAST. 90 capsule 3    flash glucose scanning reader (FREESTYLE MISTI 14 DAY READER) Misc 1 each by Misc.(Non-Drug; Combo Route) route once daily. 2 each 11    flash glucose sensor (FREESTYLE MISTI 14 DAY SENSOR) Kit 1 each by Misc.(Non-Drug; Combo Route) " "route once daily. 2 kit 11    gabapentin (NEURONTIN) 100 MG capsule Take 2 capsules (200 mg total) by mouth every evening. 60 capsule 11    insulin aspart U-100 (NOVOLOG FLEXPEN U-100 INSULIN) 100 unit/mL InPn pen INJECT 35 U AM, 45 U AT NOON, 35 U PM.150-200 +2, 201-250 +4, 251-300 +6, 301-350 +8, >350 +10 30 Syringe 1    insulin glargine, TOUJEO, (TOUJEO SOLOSTAR U-300 INSULIN) 300 unit/mL (1.5 mL) InPn pen Inject 50 Units into the skin 2 (two) times daily. 6 Syringe 6    INVOKANA 300 mg Tab tablet Take 1 tablet (300 mg total) by mouth once daily. 30 tablet 6    irbesartan (AVAPRO) 300 MG tablet Take 1 tablet (300 mg total) by mouth every evening. 90 tablet 3    lancets 30 gauge Misc       metoprolol succinate (TOPROL-XL) 100 MG 24 hr tablet TAKE 1 TABLET (100 MG TOTAL) BY MOUTH ONCE DAILY. 30 tablet 11    nitroGLYCERIN (NITROSTAT) 0.4 MG SL tablet Take one every 2-3 min till chestpain relief for 3 times and if still no relief, call MD or come to ED 30 tablet 11    omega-3 acid ethyl esters (LOVAZA) 1 gram capsule Take 1 g by mouth once daily.       pen needle, diabetic (BD ULTRA-FINE GRABIEL PEN NEEDLES) 32 gauge x 5/32" Ndle For use with insulin pens daily 4 times a day 100 each 11    prasugrel (EFFIENT) 10 mg Tab TAKE 1 TABLET (10 MG TOTAL) BY MOUTH ONCE DAILY. 30 tablet 10    zolpidem (AMBIEN) 5 MG Tab Take 1 tablet (5 mg total) by mouth nightly as needed. 30 tablet 2    cyclobenzaprine (FLEXERIL) 10 MG tablet TAKE 1 TABLET BY MOUTH 3 TIMES A DAY AS NEEDED FOR MUSCLE SPASMS. NO WORKING OR DRIVING ON THIS MED 45 tablet 5    tramadol (ULTRAM) 50 mg tablet Take 1 tablet (50 mg total) by mouth 3 (three) times daily as needed for Pain. 60 tablet 1     No current facility-administered medications for this visit.      Facility-Administered Medications Ordered in Other Visits   Medication Dose Route Frequency Provider Last Rate Last Dose    0.9%  NaCl infusion  500 mL Intravenous Continuous Alvah T " Mc Farah MD        lidocaine (PF) 10 mg/ml (1%) injection 10 mg  1 mL Intradermal Once Dave Bentley Jr., MD           REVIEW OF SYSTEMS:    GENERAL:  No weight loss, malaise or fevers.  HEENT:  Negative for frequent or significant headaches.  NECK:  Negative for lumps, goiter, pain and significant neck swelling.  RESPIRATORY:  Negative for cough, wheezing or shortness of breath.  CARDIOVASCULAR:  Negative for chest pain, leg swelling or palpitations.  GI:  Negative for abdominal discomfort, blood in stools or black stools or change in bowel habits.  MUSCULOSKELETAL:  See HPI.  SKIN:  Negative for lesions, rash, and itching.  PSYCH:  POSITIVE sleep disturbance, mood disorder and recent psychosocial stressors.  HEMATOLOGY/LYMPHOLOGY:  Negative for prolonged bleeding, bruising easily or swollen nodes.  NEURO:   No history of headaches, syncope, paralysis, seizures or tremors.  All other reviewed and negative other than HPI.     Past Medical History:  Past Medical History:   Diagnosis Date    Anticoagulant long-term use     Asthma     Back pain     CAD (coronary artery disease)     s/p stentimg  (2), (1)    Carotid artery stenosis     Diabetes mellitus type 2 in obese     HTN (hypertension), benign     Hyperlipidemia     Keloid cicatrix     NPDR (nonproliferative diabetic retinopathy) 2015    NSTEMI (non-ST elevated myocardial infarction)     Nuclear sclerosis - Right Eye 3/18/2014    Primary localized osteoarthrosis, lower leg 2014    Sleep apnea        Past Surgical History:  Past Surgical History:   Procedure Laterality Date    CARDIAC CATHETERIZATION      cataract extraction left eye      cataracts      CATHETERIZATION, HEART, LEFT Left 2014    Per[facility] at [facility] CATH LAB     SECTION, LOW TRANSVERSE      CORONARY ANGIOPLASTY      ESOPHAGOGASTRODUODENOSCOPY (EGD) N/A 2016    Performed by Gardenia Adamson MD at [facility] ENDO  (4TH FLR)    EXCISION TURBINATE, SUBMUCOUS      HAND SURGERY Left     HAND SURGERY Right     torn ligament repair/ Dr. Yeboah    HYSTERECTOMY      Injection,steroid,epidural,transforaminal approach - Bilateral - S1 Bilateral 9/25/2018    Per[facility]l [facility]breu [facility] at [facility] PAIN MGT    Injection-steroid-epidural-caudal N/A 2/7/2019    Per[facility] by Dave [facility]ley Jr., [facility] at [facility] PAIN MGT    INSERTION-INTRAOCULAR LENS (IOL) Right 9/1/2015    Performed by Good Domingo [facility] at [facility] OR 1ST FLR    left foot surgery      left wrist surgery      NASAL SEPTUM SURGERY  5/7/15    PHACOEMULSIFICATION-ASPIRATION-CATARACT Right 9/1/2015    Performed [facility] Good [facility]ingo [facility] at [facility] OR 1ST FLR    RESECTION-TURBINATES (SMR) N/A 5/7/2015    Performed by Dileep Dubois III, [facility] at [facility] OR 2ND FLR    rt elbow surgery      S/P LAD COATED STENT  05/14/2010    6 total     S/P OM1 STENT  08/2003    SEPTOPLASTY N/A 5/7/2015    Performed by Dileep [facility]ois III, [facility] at [facility] OR 2ND FLR    SINUS SURGERY      F.E.S.S.    SINUS SURGERY FUNCTIONAL ENDOSCOPIC WITH NAVIGATION WITH MAXILLARIES, ETHMOIDS, LEFT SPHENOID, LEFT LOLY N/A 5/7/2015    Performed by Dileep [facility]ois III, MD at [facility] OR 2ND FLR    TUBAL LIGATION         Family History:  Family History   Problem Relation Age of Onset    Diabetes Mother     Heart disease Mother     Diabetes Father     Leukemia Father         leukemia    Heart attack Father     Diabetes Sister     Diabetes Brother     Diabetes Sister     No Known Problems Sister     No Known Problems Brother     No Known Problems Brother     No Known Problems Maternal Grandmother     No Known Problems Maternal Grandfather     No Known Problems Paternal Grandmother     No Known Problems Paternal Grandfather     No Known Problems Son     No Known Problems Son     No Known Problems Maternal Aunt     No Known Problems Maternal Uncle     No Known Problems Paternal Aunt      No Known Problems Paternal Uncle     Colon cancer Neg Hx     Inflammatory bowel disease Neg Hx     Melanoma Neg Hx     Psoriasis Neg Hx     Lupus Neg Hx     Eczema Neg Hx     Acne Neg Hx     Amblyopia Neg Hx     Blindness Neg Hx     Cancer Neg Hx     Cataracts Neg Hx     Glaucoma Neg Hx     Hypertension Neg Hx     Macular degeneration Neg Hx     Retinal detachment Neg Hx     Strabismus Neg Hx     Stroke Neg Hx     Thyroid disease Neg Hx     Heart failure Neg Hx     Hyperlipidemia Neg Hx        Social History:  Social History     Socioeconomic History    Marital status:      Spouse name: Shamir    Number of children: 2    Years of education: None    Highest education level: None   Social Needs    Financial resource strain: None    Food insecurity - worry: None    Food insecurity - inability: None    Transportation needs - medical: None    Transportation needs - non-medical: None   Occupational History    Occupation: [employer]     Employer: [employer]     Employer: [email or web address]     Employer: [employer]   Tobacco Use    Smoking status: Never Smoker    Smokeless tobacco: Never Used   Substance and Sexual Activity    Alcohol use: No     Alcohol/week: 0.0 oz    Drug use: No    Sexual activity: Yes     Partners: Male     Birth control/protection: Post-menopausal     Comment:    Other Topics Concern    Are you pregnant or think you may be? No    Breast-feeding No   Social History Narrative    . 2 children.        OBJECTIVE:    BP (!) 142/71   Pulse 105   Wt 72.1 kg (158 lb 15.2 oz)   BMI 27.28 kg/m²     PHYSICAL EXAMINATION:    General appearance: Well appearing, in no acute distress, alert and oriented x3.  Psych:  Mood and affect appropriate.  Skin: Skin color, texture, turgor normal, no rashes or lesions, in both upper and lower body.  Head/face:  Atraumatic, normocephalic. No palpable lymph nodes  Neck: No pain to  palpation over the cervical paraspinous muscles. Spurling Negative. No pain with neck flexion, extension, or lateral flexion. .  Cor: RRR  Pulm: CTA  GI: Abdomen soft and non-tender.  Back: Straight leg raising in the sitting and supine positions is + to radicular pain posteriorly. + pain to palpation over the spine or costovertebral angles. Normal range of motion with pain reproduction.  Extremities: Peripheral joint ROM is full and pain free without obvious instability or laxity in all four extremities. No deformities, edema, or skin discoloration. Good capillary refill.  Musculoskeletal: Shoulder, hip, sacroiliac and knee provocative maneuvers are negative. Bilateral upper and lower extremity strength is normal and symmetric.  No atrophy or tone abnormalities are noted.  Neuro: Bilateral upper and lower extremity coordination and muscle stretch reflexes are physiologic and symmetric.  Plantar response are downgoing. No loss of sensation is noted.  Gait: Normal.    ASSESSMENT: 58 y.o. year old female with low back and bilateral leg  pain, consistent with      Diagnosis:    1. Herniated lumbar intervertebral disc  MRI Lumbar Spine Without Contrast    Ambulatory Referral to Neurosurgery   2. Lumbosacral radiculopathy  MRI Lumbar Spine Without Contrast    Ambulatory Referral to Neurosurgery   3. DDD (degenerative disc disease), lumbosacral  MRI Lumbar Spine Without Contrast    Ambulatory Referral to Neurosurgery   4. Chronic pain syndrome           PLAN:     - I have stressed the importance of physical activity and a home exercise plan to help with pain and improve health.  - Patient can continue with medications for now since they are providing benefits, using them appropriately, and without side effects.  - Counseled patient regarding the importance of activity modification, constant sleeping habits and physical therapy.  -Ordered updated Lumbar MRI  -Refer to neurosurgery for further assessment of her  pain  -Education given for SCS therapy  -RTC following Lumbar  MRI and referral to neurosurgery  -The above plan and management options were discussed at length with patient. Patient is in agreement with the above and verbalized understanding. Dr. Bentley was consulted on this patient  and agrees with this plan.    Diallo Kruse NP-C  Interventional Pain Management      02/20/2019

## 2019-02-28 ENCOUNTER — HOSPITAL ENCOUNTER (OUTPATIENT)
Dept: RADIOLOGY | Facility: HOSPITAL | Age: 59
Discharge: HOME | End: 2019-02-28
Attending: NURSE PRACTITIONER
Payer: MEDICARE

## 2019-02-28 DIAGNOSIS — M51.26 HERNIATED LUMBAR INTERVERTEBRAL DISC: ICD-10-CM

## 2019-02-28 DIAGNOSIS — M54.17 LUMBOSACRAL RADICULOPATHY: ICD-10-CM

## 2019-02-28 DIAGNOSIS — M51.37 DDD (DEGENERATIVE DISC DISEASE), LUMBOSACRAL: ICD-10-CM

## 2019-02-28 PROCEDURE — 72148 MRI LUMBAR SPINE W/O DYE: CPT | Mod: 26,,, | Performed by: RADIOLOGY

## 2019-02-28 PROCEDURE — 72148 MRI LUMBAR SPINE W/O DYE: CPT | Mod: TC

## 2019-02-28 PROCEDURE — 72148 MRI LUMBAR SPINE WITHOUT CONTRAST: ICD-10-PCS | Mod: 26,,, | Performed by: RADIOLOGY

## 2019-03-11 ENCOUNTER — OFFICE VISIT (OUTPATIENT)
Dept: NEUROSURGERY | Facility: CLINIC | Age: 59
End: 2019-03-11
Payer: MEDICARE

## 2019-03-11 ENCOUNTER — HOSPITAL ENCOUNTER (OUTPATIENT)
Dept: RADIOLOGY | Facility: HOSPITAL | Age: 59
Discharge: HOME | End: 2019-03-11
Attending: NEUROLOGICAL SURGERY
Payer: MEDICARE

## 2019-03-11 VITALS
DIASTOLIC BLOOD PRESSURE: 70 MMHG | WEIGHT: 158.94 LBS | SYSTOLIC BLOOD PRESSURE: 114 MMHG | HEART RATE: 94 BPM | TEMPERATURE: 99 F | HEIGHT: 64 IN | BODY MASS INDEX: 27.13 KG/M2

## 2019-03-11 DIAGNOSIS — M48.07 FORAMINAL STENOSIS OF LUMBOSACRAL REGION: ICD-10-CM

## 2019-03-11 DIAGNOSIS — M54.17 LUMBOSACRAL RADICULOPATHY AT L5: ICD-10-CM

## 2019-03-11 DIAGNOSIS — M51.37 DDD (DEGENERATIVE DISC DISEASE), LUMBOSACRAL: ICD-10-CM

## 2019-03-11 DIAGNOSIS — M51.37 DDD (DEGENERATIVE DISC DISEASE), LUMBOSACRAL: Primary | ICD-10-CM

## 2019-03-11 PROCEDURE — 3078F DIAST BP <80 MM HG: CPT | Mod: CPTII,S$GLB,, | Performed by: NEUROLOGICAL SURGERY

## 2019-03-11 PROCEDURE — 72120 X-RAY BEND ONLY L-S SPINE: CPT | Mod: TC,PN

## 2019-03-11 PROCEDURE — 72120 XR LUMBAR SPINE AP AND LAT WITH FLEX/EXT: ICD-10-PCS | Mod: 26,,, | Performed by: RADIOLOGY

## 2019-03-11 PROCEDURE — 3008F BODY MASS INDEX DOCD: CPT | Mod: CPTII,S$GLB,, | Performed by: NEUROLOGICAL SURGERY

## 2019-03-11 PROCEDURE — 3078F PR MOST RECENT DIASTOLIC BLOOD PRESSURE < 80 MM HG: ICD-10-PCS | Mod: CPTII,S$GLB,, | Performed by: NEUROLOGICAL SURGERY

## 2019-03-11 PROCEDURE — 99215 OFFICE O/P EST HI 40 MIN: CPT | Mod: S$GLB,,, | Performed by: NEUROLOGICAL SURGERY

## 2019-03-11 PROCEDURE — 72100 XR LUMBAR SPINE AP AND LAT WITH FLEX/EXT: ICD-10-PCS | Mod: 26,,, | Performed by: RADIOLOGY

## 2019-03-11 PROCEDURE — 3074F SYST BP LT 130 MM HG: CPT | Mod: CPTII,S$GLB,, | Performed by: NEUROLOGICAL SURGERY

## 2019-03-11 PROCEDURE — 3008F PR BODY MASS INDEX (BMI) DOCUMENTED: ICD-10-PCS | Mod: CPTII,S$GLB,, | Performed by: NEUROLOGICAL SURGERY

## 2019-03-11 PROCEDURE — 99215 PR OFFICE/OUTPT VISIT, EST, LEVL V, 40-54 MIN: ICD-10-PCS | Mod: S$GLB,,, | Performed by: NEUROLOGICAL SURGERY

## 2019-03-11 PROCEDURE — 72100 X-RAY EXAM L-S SPINE 2/3 VWS: CPT | Mod: 26,,, | Performed by: RADIOLOGY

## 2019-03-11 PROCEDURE — 99999 PR PBB SHADOW E&M-EST. PATIENT-LVL III: ICD-10-PCS | Mod: PBBFAC,,, | Performed by: NEUROLOGICAL SURGERY

## 2019-03-11 PROCEDURE — 99999 PR PBB SHADOW E&M-EST. PATIENT-LVL III: CPT | Mod: PBBFAC,,, | Performed by: NEUROLOGICAL SURGERY

## 2019-03-11 PROCEDURE — 72120 X-RAY BEND ONLY L-S SPINE: CPT | Mod: 26,,, | Performed by: RADIOLOGY

## 2019-03-11 PROCEDURE — 3074F PR MOST RECENT SYSTOLIC BLOOD PRESSURE < 130 MM HG: ICD-10-PCS | Mod: CPTII,S$GLB,, | Performed by: NEUROLOGICAL SURGERY

## 2019-03-11 NOTE — PROGRESS NOTES
NEUROSURGICAL OUTPATIENT CONSULTATION NOTE    DATE OF SERVICE:  03/11/2019    ATTENDING PHYSICIAN:  Mk George MD    CONSULT REQUESTED BY:  Diallo Kruse NP    REASON FOR CONSULT:  Chronic low back pain    SUBJECTIVE:    HISTORY OF PRESENT ILLNESS:  This is a very pleasant 58 y.o. female who has been complaining of low back pain in the L5-S1 area since 2016.  The pain can reach 10/10.  The pain radiates down the right leg in the L5 distribution.  The pain is worse with standing walking and sitting for prolonged period of time.  Her functional status and quality of life is affected by the pain.  She tried physical therapy in 2016 without significant pain relief.  She does wear a back brace for pain relief.  She had multiple spinal epidural steroid injection.  Her last epidural at L5-S1 gave her 6 hr of complete pain relief.     Low Back Pain Scale  R Low Back-Pain Score: 7  R Low Back-Pain Intensity: Pain killers have no effect on the pain and I do not use them  R Low Back-Pain Score: It is painful to look after myself and I am slow and careful  Low Back-Lifting: Pain prevents me from lifting heavy weights off the floor, but I can manage if they are conveniently positioned for example on a table   Low Back-Walking: Pain prevents me walking more than .25 mile   Low Back-Sitting: Pain prevents me from sitting more than 1 hour   Low Back-Standing: I avoid standing because it increases the pain immediately   Low Back-Sleeping: Because of pain my normal nights sleep is reduced by less than one quarter   Low Back-Social Life: Pain has no significant effect on my social life apart from limiting my more en   Low Back-Traveling: I have extra pain while traveling but it does not compel me to seek alternate forms of travel   Low Back-Changing Degree of Pain: My pain is gradually worsening         PAST MEDICAL HISTORY:  Active Ambulatory Problems     Diagnosis Date Noted    Hypertension associated with diabetes      Hyperlipidemia     CAD (coronary artery disease)     Sleep apnea     Carotid stenosis, bilateral 10/24/2012    Non compliance with medical treatment 06/19/2013    Coronary stent restenosis 02/26/2014    S/P coronary artery stent placement 02/26/2014    Nuclear sclerosis - Right Eye 03/18/2014    Primary localized osteoarthrosis, lower leg 06/18/2014    Chronic sinusitis 05/07/2015    PSC (posterior subcapsular cataract), right 08/17/2015    DME (diabetic macular edema) 08/17/2015    Refractive error 08/17/2015    Pseudophakia 08/17/2015    Senile nuclear sclerosis 09/01/2015    Type 2 diabetes mellitus with diabetic peripheral angiopathy without gangrene 02/24/2016    Diabetic peripheral neuropathy associated with type 2 diabetes mellitus 02/24/2016    Cervical arthritis 08/29/2016    Neurogenic claudication 08/29/2016    Lumbar herniated disc 10/24/2016    Fatty liver disease, nonalcoholic 11/01/2017    Arthritis of lumbar spine 07/23/2018    Chronic pain 09/25/2018    Lumbar radiculopathy 02/06/2019     Resolved Ambulatory Problems     Diagnosis Date Noted    Diabetes mellitus type II, uncontrolled 10/24/2012    Obesity 10/24/2012    Neck pain 03/07/2013    NSTEMI (non-ST elevated myocardial infarction) 02/17/2014    PAPA (acute kidney injury) 02/17/2014    Type II or unspecified type diabetes mellitus with other specified manifestations, uncontrolled 06/06/2014    Peripheral neuropathy 06/06/2014    Enthesopathy of hip region 06/18/2014    Type II or unspecified type diabetes mellitus with peripheral circulatory disorders, uncontrolled(250.72) 09/25/2014    Diabetes mellitus with peripheral artery disease 02/02/2015    Dysphagia 08/12/2016    Neck pain on right side 10/14/2016    Chronic bilateral low back pain with sciatica 10/14/2016    Decreased range of motion 10/14/2016    Decreased strength 10/14/2016    Decreased functional mobility 10/14/2016    Closed nondisplaced  fracture of fifth metatarsal bone of right foot with routine healing 2017     Past Medical History:   Diagnosis Date    Anticoagulant long-term use     Asthma     Back pain     CAD (coronary artery disease)     Carotid artery stenosis     Diabetes mellitus type 2 in obese     HTN (hypertension), benign     Hyperlipidemia     Keloid cicatrix     NPDR (nonproliferative diabetic retinopathy) 2015    NSTEMI (non-ST elevated myocardial infarction)     Nuclear sclerosis - Right Eye 3/18/2014    Primary localized osteoarthrosis, lower leg 2014    Sleep apnea        PAST SURGICAL HISTORY:  Past Surgical History:   Procedure Laterality Date    CARDIAC CATHETERIZATION      cataract extraction left eye      cataracts      CATHETERIZATION, HEART, LEFT Left 2014    Performed by Wilman Kim MD at [facility] CATH LAB     SECTION, LOW TRANSVERSE      CORONARY ANGIOPLASTY      ESOPHAGOGASTRODUODENOSCOPY (EGD) N/A 2016    Performed by Gardenia Adamson MD at [facility] ENDO (4TH FLR)    EXCISION TURBINATE, SUBMUCOUS      HAND SURGERY Left     HAND SURGERY Right     torn ligament repair/ Dr. Yeboah    HYSTERECTOMY      Injection,steroid,epidural,transforaminal approach - Bilateral - S1 Bilateral 2018    Performed by Tl Abreu MD at [facility] PAIN MGT    Injection-steroid-epidural-caudal N/A 2019    Performed by Dave Bentley Jr., MD at [facility] PAIN MGT    INSERTION-INTRAOCULAR LENS (IOL) Right 2015    Performed by Good Domingo MD at [facility] OR 1ST FLR    left foot surgery      left wrist surgery      NASAL SEPTUM SURGERY  5/7/15    PHACOEMULSIFICATION-ASPIRATION-CATARACT Right 2015    Performed by Good Domingo MD at [facility] OR 1ST FLR    RESECTION-TURBINATES (SMR) N/A 2015    Performed by Dileep Dubois III, MD at [facility] OR 2ND FLR    rt elbow surgery      S/P LAD COATED STENT  2010    6 total     S/P OM1 STENT  2003     SEPTOPLASTY N/A 5/7/2015    Performed by Dileep [facility] III, MD at [facility] OR 2ND FLR    SINUS SURGERY      F.E.S.S.    SINUS SURGERY FUNCTIONAL ENDOSCOPIC WITH NAVIGATION WITH MAXILLARIES, ETHMOIDS, LEFT SPHENOID, LEFT LOLY N/A 5/7/2015    Performed by Dileep Dubois III, MD at [facility] OR 2ND FLR    TUBAL LIGATION         SOCIAL HISTORY:   Social History     Socioeconomic History    Marital status:      Spouse name: Shamir    Number of children: 2    Years of education: Not on file    Highest education level: Not on file   Social Needs    Financial resource strain: Not on file    Food insecurity - worry: Not on file    Food insecurity - inability: Not on file    Transportation needs - medical: Not on file    Transportation needs - non-medical: Not on file   Occupational History    Occupation: cafeteria     Employer: [employer]     Employer: [employer]     Employer: [employer]   Tobacco Use    Smoking status: Never Smoker    Smokeless tobacco: Never Used   Substance and Sexual Activity    Alcohol use: No     Alcohol/week: 0.0 oz    Drug use: No    Sexual activity: Yes     Partners: Male     Birth control/protection: Post-menopausal     Comment:    Other Topics Concern    Are you pregnant or think you may be? No    Breast-feeding No   Social History Narrative    . 2 children.        FAMILY HISTORY:  Family History   Problem Relation Age of Onset    Diabetes Mother     Heart disease Mother     Diabetes Father     Leukemia Father         leukemia    Heart attack Father     Diabetes Sister     Diabetes Brother     Diabetes Sister     No Known Problems Sister     No Known Problems Brother     No Known Problems Brother     No Known Problems Maternal Grandmother     No Known Problems Maternal Grandfather     No Known Problems Paternal Grandmother     No Known Problems Paternal Grandfather     No Known Problems Son     No Known  "Problems Son     No Known Problems Maternal Aunt     No Known Problems Maternal Uncle     No Known Problems Paternal Aunt     No Known Problems Paternal Uncle     Colon cancer Neg Hx     Inflammatory bowel disease Neg Hx     Melanoma Neg Hx     Psoriasis Neg Hx     Lupus Neg Hx     Eczema Neg Hx     Acne Neg Hx     Amblyopia Neg Hx     Blindness Neg Hx     Cancer Neg Hx     Cataracts Neg Hx     Glaucoma Neg Hx     Hypertension Neg Hx     Macular degeneration Neg Hx     Retinal detachment Neg Hx     Strabismus Neg Hx     Stroke Neg Hx     Thyroid disease Neg Hx     Heart failure Neg Hx     Hyperlipidemia Neg Hx        CURRENTS MEDICATIONS:  Current Outpatient Medications on File Prior to Visit   Medication Sig Dispense Refill    ACCU-CHEK EDIN PLUS METER Misc       albuterol (PROVENTIL/VENTOLIN HFA) 90 mcg/actuation inhaler Inhale 2 puffs into the lungs every 6 (six) hours as needed for Wheezing. 1 each 11    alirocumab (PRALUENT PEN) 75 mg/mL PnIj Inject 1 mL (75 mg total) into the skin every 14 (fourteen) days. 6 mL 3    amLODIPine (NORVASC) 10 MG tablet TAKE 1 TABLET (10 MG TOTAL) BY MOUTH ONCE DAILY. 30 tablet 6    aspirin 81 mg Tab Take 81 mg by mouth. 1 Tablet Oral Every day      atorvastatin (LIPITOR) 40 MG tablet TAKE 1 TABLET (40 MG TOTAL) BY MOUTH ONCE DAILY. 30 tablet 11    azithromycin (Z-PHONG) 250 MG tablet Take 2 tablets by mouth on day 1; Take 1 tablet by mouth on days 2-5 6 tablet 0    BD INSULIN PEN NEEDLE UF MINI 31 x 3/16 " Ndle USE 4 TIMES A  each 11    blood sugar diagnostic (ACCU-CHEK EDIN PLUS TEST STRP) Strp TEST BLOOD SUGARS 4 TIMES DAILY 150 strip 11    chlorthalidone (HYGROTEN) 50 MG Tab Take 1 tablet (50 mg total) by mouth once daily. 90 tablet 3    cyclobenzaprine (FLEXERIL) 10 MG tablet TAKE 1 TABLET BY MOUTH 3 TIMES A DAY AS NEEDED FOR MUSCLE SPASMS. NO WORKING OR DRIVING ON THIS MED 45 tablet 5    dulaglutide (TRULICITY) 1.5 mg/0.5 mL PnIj " "Inject 1.5 mg into the skin every 7 days. 4 Syringe 6    esomeprazole (NEXIUM) 40 MG capsule TAKE 1 CAPSULE (40 MG TOTAL) BY MOUTH BEFORE BREAKFAST. 90 capsule 3    fenofibrate micronized (LOFIBRA) 134 MG Cap TAKE 1 CAPSULE (134 MG TOTAL) BY MOUTH BEFORE BREAKFAST. 90 capsule 3    flash glucose scanning reader (FREESTYLE MISTI 14 DAY READER) Misc 1 each by Misc.(Non-Drug; Combo Route) route once daily. 2 each 11    flash glucose sensor (FREESTYLE MISTI 14 DAY SENSOR) Kit 1 each by Misc.(Non-Drug; Combo Route) route once daily. 2 kit 11    gabapentin (NEURONTIN) 100 MG capsule Take 2 capsules (200 mg total) by mouth every evening. 60 capsule 11    insulin aspart U-100 (NOVOLOG FLEXPEN U-100 INSULIN) 100 unit/mL InPn pen INJECT 35 U AM, 45 U AT NOON, 35 U PM.150-200 +2, 201-250 +4, 251-300 +6, 301-350 +8, >350 +10 30 Syringe 1    insulin glargine, TOUJEO, (TOUJEO SOLOSTAR U-300 INSULIN) 300 unit/mL (1.5 mL) InPn pen Inject 50 Units into the skin 2 (two) times daily. 6 Syringe 6    INVOKANA 300 mg Tab tablet Take 1 tablet (300 mg total) by mouth once daily. 30 tablet 6    irbesartan (AVAPRO) 300 MG tablet Take 1 tablet (300 mg total) by mouth every evening. 90 tablet 3    lancets 30 gauge Misc       metoprolol succinate (TOPROL-XL) 100 MG 24 hr tablet TAKE 1 TABLET (100 MG TOTAL) BY MOUTH ONCE DAILY. 30 tablet 11    nitroGLYCERIN (NITROSTAT) 0.4 MG SL tablet Take one every 2-3 min till chestpain relief for 3 times and if still no relief, call MD or come to ED 30 tablet 11    omega-3 acid ethyl esters (LOVAZA) 1 gram capsule Take 1 g by mouth once daily.       pen needle, diabetic (BD ULTRA-FINE GRABIEL PEN NEEDLES) 32 gauge x 5/32" Ndle For use with insulin pens daily 4 times a day 100 each 11    prasugrel (EFFIENT) 10 mg Tab TAKE 1 TABLET (10 MG TOTAL) BY MOUTH ONCE DAILY. 30 tablet 10    tramadol (ULTRAM) 50 mg tablet Take 1 tablet (50 mg total) by mouth 3 (three) times daily as needed for Pain. 60 " tablet 1    zolpidem (AMBIEN) 5 MG Tab Take 1 tablet (5 mg total) by mouth nightly as needed. 30 tablet 2     Current Facility-Administered Medications on File Prior to Visit   Medication Dose Route Frequency Provider Last Rate Last Dose    0.9%  NaCl infusion  500 mL Intravenous Continuous Dave Bentley Jr., MD        lidocaine (PF) 10 mg/ml (1%) injection 10 mg  1 mL Intradermal Once Dave Bentley Jr., MD           ALLERGIES:  Review of patient's allergies indicates:  No Known Allergies    REVIEW OF SYSTEMS:  Review of Systems   Constitutional: Negative for diaphoresis, fever and weight loss.   Respiratory: Negative for shortness of breath.    Cardiovascular: Negative for chest pain.   Gastrointestinal: Negative for blood in stool.   Genitourinary: Negative for hematuria.   Endo/Heme/Allergies: Does not bruise/bleed easily.   All other systems reviewed and are negative.      OBJECTIVE:    PHYSICAL EXAMINATION:   Vitals:    03/11/19 1332   BP: 114/70   Pulse: 94   Temp: 98.6 °F (37 °C)       Physical Exam:  Vitals reviewed.    Constitutional: She appears well-developed and well-nourished.     Eyes: Pupils are equal, round, and reactive to light. Conjunctivae and EOM are normal.     Cardiovascular: Normal distal pulses and no edema.     Abdominal: Soft.     Skin: Skin displays no rash on trunk and no rash on extremities. Skin displays no lesions on trunk and no lesions on extremities.     Psych/Behavior: She is alert. She is oriented to person, place, and time. She has a normal mood and affect.     Musculoskeletal:        Neck: Range of motion is full.     Neurological:        DTRs: Tricep reflexes are 2+ on the right side and 2+ on the left side. Bicep reflexes are 2+ on the right side and 2+ on the left side. Brachioradialis reflexes are 2+ on the right side and 2+ on the left side. Patellar reflexes are 2+ on the right side and 2+ on the left side. Achilles reflexes are 2+ on the right side and 2+ on  the left side.       Back Exam     Tenderness   The patient is experiencing no tenderness.     Range of Motion   Extension: normal   Flexion: normal   Lateral bend right: normal   Lateral bend left: normal   Rotation right: normal   Rotation left: normal     Muscle Strength   Right Quadriceps:  5/5   Left Quadriceps:  5/5   Right Hamstrings:  5/5   Left Hamstrings:  5/5     Tests   Straight leg raise right: negative  Straight leg raise left: negative    Other   Toe walk: normal  Heel walk: normal              Neurologic Exam     Mental Status   Oriented to person, place, and time.   Speech: speech is normal   Level of consciousness: alert    Cranial Nerves   Cranial nerves II through XII intact.     CN III, IV, VI   Pupils are equal, round, and reactive to light.  Extraocular motions are normal.     Motor Exam   Muscle bulk: normal  Overall muscle tone: normal    Strength   Right deltoid: 5/5  Left deltoid: 5/5  Right biceps: 5/5  Left biceps: 5/5  Right triceps: 5/5  Left triceps: 5/5  Right wrist flexion: 5/5  Left wrist flexion: 5/5  Right wrist extension: 5/5  Left wrist extension: 5/5  Right interossei: 5/5  Left interossei: 5/5  Right iliopsoas: 5/5  Left iliopsoas: 5/5  Right quadriceps: 5/5  Left quadriceps: 5/5  Right hamstrin/5  Left hamstrin/5  Right anterior tibial: 5/5  Left anterior tibial: 5/5  Right posterior tibial: 5/5  Left posterior tibial: 5/5  Right peroneal: 5/5  Left peroneal: 5/5  Right gastroc: 5/5  Left gastroc: 5/5    Sensory Exam   Light touch normal.   Pinprick normal.     Gait, Coordination, and Reflexes     Gait  Gait: normal    Coordination   Finger to nose coordination: normal  Tandem walking coordination: normal    Reflexes   Right brachioradialis: 2+  Left brachioradialis: 2+  Right biceps: 2+  Left biceps: 2+  Right triceps: 2+  Left triceps: 2+  Right patellar: 2+  Left patellar: 2+  Right achilles: 2+  Left achilles: 2+  Right plantar: normal  Left plantar:  normal  Right Crump: absent  Left Crump: absent  Right ankle clonus: absent  Left ankle clonus: absent        DIAGNOSTIC DATA:  I personally interpreted the following imaging:   Lumbar spine MRI February 2019 compared to 2016:  Severe degenerative disc disease at L5-S1, right moderate foraminal stenosis    ASSESMENT:  This is a 58 y.o. female with     Problem List Items Addressed This Visit     None      Visit Diagnoses     DDD (degenerative disc disease), lumbosacral    -  Primary    Relevant Orders    X-Ray Lumbar Spine Ap Lateral w/Flex Ext    Foraminal stenosis of lumbosacral region        Relevant Orders    X-Ray Lumbar Spine Ap Lateral w/Flex Ext    Lumbosacral radiculopathy at L5        Relevant Orders    X-Ray Lumbar Spine Ap Lateral w/Flex Ext          PLAN:  I explained the natural history of the disease and all treatment options. I recommended a left anterior to the psoas L5-S1 interbody fusion with posterior instrumentation the goal of having more than 50% pain relief.     We have discussed the risks of surgery including bleeding, infection, failure of surgery, CSF leak, nerve root injury, spinal cord injury, weakness, paralysis, peripheral neuropathy, malplaced hardware, migration of hardware, non-union, need for reoperation. Patient understands the risks and would like to proceed with surgery.      The patient has increased perioperative risks because of these comorbidities:  CAD.         Mk George MD  Cell:[phone]

## 2019-03-20 ENCOUNTER — TELEPHONE (OUTPATIENT)
Dept: NEUROSURGERY | Facility: CLINIC | Age: 59
End: 2019-03-20

## 2019-03-20 DIAGNOSIS — M51.36 DDD (DEGENERATIVE DISC DISEASE), LUMBAR: Primary | ICD-10-CM

## 2019-03-20 DIAGNOSIS — Z98.1 S/P LUMBAR SPINAL FUSION: ICD-10-CM

## 2019-03-22 ENCOUNTER — TELEPHONE (OUTPATIENT)
Dept: INTERNAL MEDICINE | Facility: CLINIC | Age: 59
End: 2019-03-22

## 2019-03-22 ENCOUNTER — ANESTHESIA EVENT (OUTPATIENT)
Dept: ENDOSCOPY | Facility: HOSPITAL | Age: 59
End: 2019-03-22

## 2019-03-22 DIAGNOSIS — M79.604 PAIN OF RIGHT LOWER EXTREMITY: ICD-10-CM

## 2019-03-22 DIAGNOSIS — Z01.818 PREOPERATIVE TESTING: Primary | ICD-10-CM

## 2019-03-22 NOTE — ANESTHESIA PREPROCEDURE EVALUATION
Anesthesia Assessment: Preoperative EQUATION     Planned Procedure: Procedure(s) (LRB):  DISCECTOMY, SPINE, CERVICAL, ANTERIOR APPROACH, WITH FUSION Left L5-S1 ATP Fusion (ATP TSERING) L5-s1 Posterior Instrumentation (Left)  Requested Anesthesia Type:General  Surgeon: Mk George MD  Service: Neurosurgery  Known or anticipated Date of Surgery:4/12/2019     Surgeon notes: reviewed     Electronic QUestionnaire Assessment completed via nurse interview with patient.    NO AQ        Triage considerations:      Previous anesthesia records:MAC and No problems  8/12/2016 ESOPHAGOGASTRODUODENOSCOPY (EGD) (N/A )   Airway/Jaw/Neck:  Airway Findings: Mouth Opening: Normal Tongue: Normal  General Airway Assessment: Good  Mallampati: II  Improves to I with phonation.  TM Distance: Normal, at least 6 cm  Jaw/Neck Findings:  Neck ROM: Normal ROM            Last PCP note: 3-6 months ago , within [facility]   Subspecialty notes: Cardiology: General, Endocrinology, Pain Management, NEUROSURGERY,& PODIATRY     Other important co-morbidities: PER Epic: HTN, HLD,DM2 WITH PERIPHERAL NEUROPATHY, CAD, H/O NSTEMI, GASTON CAROTID STENOSIS, NONALCOHOLIC FATTY LIVER DISEASE, MURTAZA, ASTHMA,& BRONCHITIS     Tests already available:  Available tests,  within 3 months , within [facility] .3/11/2019 XRAY LUMBAR SPINE AP/LAT WITH FLEX/EXT, 2/28/2019 MRI LUMBAR SPINE W/O CONTRAST,2/19/2019HGA1C,1/21/2019 HEPATIC FUNCTION PANEL, LIPID PROFILE, 5/14/2018 CARDIAC PET SCAN                       Instructions given. (See in Nurse's note)     Optimization:  Anesthesia Preop Clinic Assessment  Indicated    Medical Opinion Indicated                       Sub-specialist consult indicated:   CARDIOLOGY                          Plan:           Testing:  CBC, CMP, PT/INR, PTT, T&S, EKG and UA   Pre-anesthesia  visit                                   Visit focus: concerns in complex and/or prolonged anesthesia                      Consultation:Patient's PCP for a  statement of optimization                       Patient  has previously scheduled Medical Appointment:NONE     Navigation: Tests Scheduled. TBD                   Consults scheduled.TBD                   Results will be tracked by Preop Clinic.                      3/22 Discussed case with Dr. Shell for risk classification: treat as Matheus 4.          Electronically signed by Clementine Monroe RN at 3/22/2019  2:43 PM       Pre-admit on 4/12/2019            Detailed Report    [facility]  From: Adelaide Aguilar MD   Sent: 3/22/2019   4:14 PM   To: Cynthia Smith MA     Both Prasugrel and ASA should be held for 7 days prior to the procedure and restarted as soon as surgeon approves    3/25 Cardiology clearance given by Dr. Aguilar on 3/25( telephone note 3/25)    3/28 Medical clearance given by Dr. Jasbir Haney on 3/28.                                                                                                        03/22/2019  Mariann Huff is a 59 y.o., female.    Pre-op Assessment      I have reviewed the Medications.     Review of Systems  Anesthesia Hx:  History of prior surgery of interest to airway management or planning: Previous anesthesia: General 8/2016: EGD with general anesthesia.  Procedure performed at an [facility] Facility. Denies Family Hx of Anesthesia complications.   Denies Personal Hx of Anesthesia complications.   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 Denies Tobacco Use. Denies Alcohol Use.   Hematology/Oncology:         -- Coag Disorders: Bleeding Disorder:   EENT/Dental:   Denies Throat Symptoms Denies Jaw Problems   Cardiovascular:  Functional Capacity good / => 4 METS, can walk up two flight of stairs: denies CP/SOB  Coronary Artery Disease: S/P Percutaneous Coronary Intervention (PCI) (x 6) coronary stent, unknown type, currently on aspirin. Hx of Myocardial Infarction, NSTEMI date of 2014  Denies Congestive Heart Failure (CHF)  Denies Deep Venous Thrombosis (DVT)   Carotoid Artery Disease, bilateral , Left stenosis is 20-39% , Right stenosis is  20-39% Hypertension , Recent typical clinic B/P of 165/78 @ POC visit    Pulmonary:  Asthma: (last year 1 year ago)  last episode was > 1 year ago.  Obstructive Sleep Apnea (MURTAZA). No CPAP use   Renal/:  Denies Kidney Function/Disease    Hepatic/GI:  Esophageal / Stomach Disorders Gerd Controlled by chronic antireflux medication.  Liver Disease    Musculoskeletal:  Spine Disorders: lumbar Degenerative disease and Disc disease Herniated Disc, Lumbar Herniated Disc Lumbar Spine Disorders radiculopathy  Neurological:  Denies Seizure Disorder  Denies CVA - Cerebrovasular Accident  Denies TIA - Transient Ischemic Attack    Endocrine:  Diabetes, Type 2 Diabetes for 15 years , Complications include Diabetic Neuropathy Typical AM glucose range:  , most recent HgA1c value was 10.8 on 2/19/19.  Denies Thyroid Disease        Physical Exam  General:  Obesity    Airway/Jaw/Neck:  Airway Findings: Mouth Opening: Normal General Airway Assessment: Possible difficult intubation  Jaw/Neck Findings:  Neck ROM: Normal ROM      Dental:  Dental Findings: (multiple missing) In tact   Chest/Lungs:  Chest/Lungs Findings: Clear to auscultation, Normal Respiratory Rate     Heart/Vascular:  Heart Findings: Rate: Normal  Rhythm: Regular Rhythm  Vascular Findings: Normal       Mental Status:  Mental Status Findings:  Cooperative, Alert and Oriented        Labs/EKG pending    Ej Cox RN

## 2019-03-22 NOTE — TELEPHONE ENCOUNTER
----- Message from Clementine Monroe RN sent at 3/22/2019  2:54 PM CDT -----  Patient is scheduled for cervical spine discectomy, anterior approach, with fusion,left L5-S1 ATP fusion, L5-S1 posterior instrumentation on 4/12 with Dr. Huff (approximately 180 minutes of general anesthesia). Patient will need medical clearance. Please schedule a preop clearance appt.Thanks!

## 2019-03-25 ENCOUNTER — TELEPHONE (OUTPATIENT)
Dept: CARDIOLOGY | Facility: CLINIC | Age: 59
End: 2019-03-25

## 2019-03-25 ENCOUNTER — TELEPHONE (OUTPATIENT)
Dept: PREADMISSION TESTING | Facility: HOSPITAL | Age: 59
End: 2019-03-25

## 2019-03-25 NOTE — TELEPHONE ENCOUNTER
----- Message from Cynthia Smith MA sent at 3/22/2019  4:15 PM CDT -----      ----- Message -----  From: Adelaide Aguilar MD  Sent: 3/22/2019   4:14 PM  To: Cynthia Smith MA    Both Prasugrel and ASA should be held for 7 days prior to the procedure and restarted as soon as surgeon approves.  ----- Message -----  From: Cynthia Smith MA  Sent: 3/22/2019   3:00 PM  To: Adelaide Aguilar MD    Hi,   Can you clear pt by chart? Please advise.   Thanks,  Cynthia Smith MA  ----- Message -----  From: Clementine Monroe RN  Sent: 3/22/2019   2:56 PM  To: Adelaide Aguilar MD, Clementine Monroe RN, #    Patient is scheduled for cervical spine discectomy, anterior approach, with fusion left L5-S1 ATP, L5-S1 posterior instrumentation on 4/12 with Dr. George (approximately 180 minutes of general anesthesia). Patient will need cardiac clearance. Please schedule an preop clearance appt. Will also need medication instructions.The patient indicated above is currently prescribed the blood thinner Aspirin and Effient (prasugrel) and is scheduled for a procedure requiring anesthesia.  Please provide stop time instructions, by entering a note in EPIC.  Please respond to this request within 48 hours to prevent delays or cancellations.  If further information is desired about this request, please contact Clementine Monroe RN navigating the perioperative care of your patient. Thanks!

## 2019-03-26 ENCOUNTER — TELEPHONE (OUTPATIENT)
Dept: PREADMISSION TESTING | Facility: HOSPITAL | Age: 59
End: 2019-03-26

## 2019-03-26 DIAGNOSIS — E11.51 TYPE 2 DIABETES MELLITUS WITH DIABETIC PERIPHERAL ANGIOPATHY WITHOUT GANGRENE, WITH LONG-TERM CURRENT USE OF INSULIN: Primary | ICD-10-CM

## 2019-03-26 DIAGNOSIS — Z79.4 TYPE 2 DIABETES MELLITUS WITH DIABETIC PERIPHERAL ANGIOPATHY WITHOUT GANGRENE, WITH LONG-TERM CURRENT USE OF INSULIN: Primary | ICD-10-CM

## 2019-03-26 RX ORDER — DULAGLUTIDE 1.5 MG/.5ML
INJECTION, SOLUTION SUBCUTANEOUS
Qty: 2 ML | Refills: 6 | Status: ON HOLD | OUTPATIENT
Start: 2019-03-26 | End: 2019-06-05 | Stop reason: HOSPADM

## 2019-03-26 NOTE — TELEPHONE ENCOUNTER
----- Message from Clementine Monroe RN sent at 3/22/2019  2:52 PM CDT -----  Please schedule poc, labs, ua, and ekg no sooner than 3./29.Thanks!

## 2019-03-28 ENCOUNTER — OFFICE VISIT (OUTPATIENT)
Dept: INTERNAL MEDICINE | Facility: CLINIC | Age: 59
End: 2019-03-28
Payer: MEDICARE

## 2019-03-28 VITALS
TEMPERATURE: 98 F | BODY MASS INDEX: 27.48 KG/M2 | RESPIRATION RATE: 16 BRPM | DIASTOLIC BLOOD PRESSURE: 60 MMHG | HEART RATE: 82 BPM | HEIGHT: 64 IN | WEIGHT: 160.94 LBS | SYSTOLIC BLOOD PRESSURE: 138 MMHG

## 2019-03-28 DIAGNOSIS — M51.26 LUMBAR HERNIATED DISC: Chronic | ICD-10-CM

## 2019-03-28 DIAGNOSIS — I25.10 CORONARY ARTERY DISEASE DUE TO LIPID RICH PLAQUE: Chronic | ICD-10-CM

## 2019-03-28 DIAGNOSIS — E78.2 MIXED HYPERLIPIDEMIA: Chronic | ICD-10-CM

## 2019-03-28 DIAGNOSIS — Z01.818 PRE-OP EVALUATION: Primary | ICD-10-CM

## 2019-03-28 DIAGNOSIS — E11.59 HYPERTENSION ASSOCIATED WITH DIABETES: Chronic | ICD-10-CM

## 2019-03-28 DIAGNOSIS — G47.33 OBSTRUCTIVE SLEEP APNEA SYNDROME: Chronic | ICD-10-CM

## 2019-03-28 DIAGNOSIS — M54.16 LUMBAR RADICULOPATHY: ICD-10-CM

## 2019-03-28 DIAGNOSIS — I65.23 CAROTID STENOSIS, BILATERAL: Chronic | ICD-10-CM

## 2019-03-28 DIAGNOSIS — I25.83 CORONARY ARTERY DISEASE DUE TO LIPID RICH PLAQUE: Chronic | ICD-10-CM

## 2019-03-28 DIAGNOSIS — E11.51 TYPE 2 DIABETES MELLITUS WITH DIABETIC PERIPHERAL ANGIOPATHY WITHOUT GANGRENE, UNSPECIFIED WHETHER LONG TERM INSULIN USE: Chronic | ICD-10-CM

## 2019-03-28 DIAGNOSIS — I15.2 HYPERTENSION ASSOCIATED WITH DIABETES: Chronic | ICD-10-CM

## 2019-03-28 PROCEDURE — 3046F PR MOST RECENT HEMOGLOBIN A1C LEVEL > 9.0%: ICD-10-PCS | Mod: CPTII,S$GLB,, | Performed by: INTERNAL MEDICINE

## 2019-03-28 PROCEDURE — 99999 PR PBB SHADOW E&M-EST. PATIENT-LVL III: ICD-10-PCS | Mod: PBBFAC,,, | Performed by: INTERNAL MEDICINE

## 2019-03-28 PROCEDURE — 3075F SYST BP GE 130 - 139MM HG: CPT | Mod: CPTII,S$GLB,, | Performed by: INTERNAL MEDICINE

## 2019-03-28 PROCEDURE — 99214 PR OFFICE/OUTPT VISIT, EST, LEVL IV, 30-39 MIN: ICD-10-PCS | Mod: S$GLB,,, | Performed by: INTERNAL MEDICINE

## 2019-03-28 PROCEDURE — 3078F PR MOST RECENT DIASTOLIC BLOOD PRESSURE < 80 MM HG: ICD-10-PCS | Mod: CPTII,S$GLB,, | Performed by: INTERNAL MEDICINE

## 2019-03-28 PROCEDURE — 99214 OFFICE O/P EST MOD 30 MIN: CPT | Mod: S$GLB,,, | Performed by: INTERNAL MEDICINE

## 2019-03-28 PROCEDURE — 99999 PR PBB SHADOW E&M-EST. PATIENT-LVL III: CPT | Mod: PBBFAC,,, | Performed by: INTERNAL MEDICINE

## 2019-03-28 PROCEDURE — 3008F PR BODY MASS INDEX (BMI) DOCUMENTED: ICD-10-PCS | Mod: CPTII,S$GLB,, | Performed by: INTERNAL MEDICINE

## 2019-03-28 PROCEDURE — 3008F BODY MASS INDEX DOCD: CPT | Mod: CPTII,S$GLB,, | Performed by: INTERNAL MEDICINE

## 2019-03-28 PROCEDURE — 3046F HEMOGLOBIN A1C LEVEL >9.0%: CPT | Mod: CPTII,S$GLB,, | Performed by: INTERNAL MEDICINE

## 2019-03-28 PROCEDURE — 3075F PR MOST RECENT SYSTOLIC BLOOD PRESS GE 130-139MM HG: ICD-10-PCS | Mod: CPTII,S$GLB,, | Performed by: INTERNAL MEDICINE

## 2019-03-28 PROCEDURE — 3078F DIAST BP <80 MM HG: CPT | Mod: CPTII,S$GLB,, | Performed by: INTERNAL MEDICINE

## 2019-03-28 NOTE — PROGRESS NOTES
Subjective:       Patient ID: Mariann Huff is a 59 y.o. female.    Chief Complaint: Pre-op Exam     HPI     60 yo female here for pre-op evaluation of discectomy lumbar L5-S1 fusion with anterior approach.    Denies CP/SOB/nausea/MI last 3 months, never diagnosed with heart failure, never diagnosed with arrhythmias, never diagnosed with valvular heart disease.    RCRI criteria: + IDDM, + CAD, - CVA, - chronic renal insufficiency, - heart failure, - high risk surgery    Functional status: she is not very active regularly.    Bleeding risk - history of bleeding with prior surgeries - none    History of prior anesthetic complications - none    - tobacco, - EtOH, - Illicit substances    Chronic conditions/medication usage - albuterol (take if needed), Norvasc 10 mg (take), Lipitor 40 mg (take), chlorthalidone 50 mg (hold a.m. of surgery), Flexeril 10 mg (take if needed), Nexium 40 mg (take), fenofibrate 134 mg (take), gabapentin 100 mg (take), NovoLog (hold a.m. of surgery),  Toujeo 50 units (take 35 units night before surgery), Invokana 300 mg (hold a.m. of surgery), irbesartan 300 mg (take), Toprol- mg (take), Lovaza 1 g (take), tramadol 50 mg (take if needed), Trulicity (take)    Chronic Steroid usage - none    She has seen cardiology who said she could have the surgery.    Review of Systems    Objective:      Physical Exam   Constitutional: She is oriented to person, place, and time. She appears well-developed and well-nourished.   HENT:   Head: Normocephalic and atraumatic.   Mouth/Throat: No oropharyngeal exudate.   Eyes: Pupils are equal, round, and reactive to light. EOM are normal. Right eye exhibits no discharge. Left eye exhibits no discharge. No scleral icterus.   Neck: Normal range of motion. Neck supple. No tracheal deviation present. No thyromegaly present.   Cardiovascular: Normal rate, regular rhythm and normal heart sounds. Exam reveals no gallop and no friction rub.   No murmur  heard.  Pulmonary/Chest: Effort normal and breath sounds normal. No respiratory distress. She has no wheezes. She has no rales. She exhibits no tenderness.   Abdominal: Soft. Bowel sounds are normal. She exhibits no distension and no mass. There is no tenderness. There is no rebound and no guarding.   Musculoskeletal: Normal range of motion. She exhibits no edema or tenderness.   Neurological: She is alert and oriented to person, place, and time.   Skin: Skin is warm and dry. No rash noted. No erythema. No pallor.   Psychiatric: She has a normal mood and affect. Her behavior is normal.   Vitals reviewed.      Assessment:       1. Pre-op evaluation    2. Lumbar herniated disc    3. Lumbar radiculopathy    4. Hypertension associated with diabetes    5. Mixed hyperlipidemia    6. Type 2 diabetes mellitus with diabetic peripheral angiopathy without gangrene, unspecified whether long term insulin use    7. Coronary artery disease due to lipid rich plaque    8. Carotid stenosis, bilateral    9. Obstructive sleep apnea syndrome        Plan:       1.  Surgery is not emergent.  Patient has no active cardiac conditions.  Surgery is not low risk.  Patient was already cleared from a cardiac standpoint by Cardiology.  May proceed with planned procedure.  2/3.  Planning on surgery.  4.  Continue chlorthalidone 50 mg, irbesartan 300 mg, Toprol- mg, Norvasc 10 mg.  5.  Continue Lipitor 40 mg.  6.  Continue NovoLog, Tuojeo  7/8.  Continue fenofibrate, Lipitor.  9.  Monitor.  Continue with CPAP at home.  10.  Patient has EKG and preop planned with anesthesia coming up.  11.  DVT prevention - Recommend use of TEDs, and early ambulation if able.  Consider short term use of anti-coagulation if appropriate post-op.  12.  Chronic medications - albuterol (take if needed), Norvasc 10 mg (take), Lipitor 40 mg (take), chlorthalidone 50 mg (hold a.m. of surgery), Flexeril 10 mg (take if needed), Nexium 40 mg (take), fenofibrate 134 mg  (take), gabapentin 100 mg (take), NovoLog (hold a.m. of surgery),  Toujeo 50 units (take 35 units night before surgery), Invokana 300 mg (hold a.m. of surgery), irbesartan 300 mg (take), Toprol- mg (take), Lovaza 1 g (take), tramadol 50 mg (take if needed), Trulicity (take)  13.  Minimize urinary catheter use.

## 2019-03-28 NOTE — PATIENT INSTRUCTIONS
albuterol (take if needed), Norvasc 10 mg (take), Lipitor 40 mg (take), chlorthalidone 50 mg (hold a.m. of surgery), Flexeril 10 mg (take if needed), Nexium 40 mg (take), fenofibrate 134 mg (take), gabapentin 100 mg (take), NovoLog (hold a.m. of surgery),  Toujeo 50 units (take 35 units night before surgery), Invokana 300 mg (hold a.m. of surgery), irbesartan 300 mg (take), Toprol- mg (take), Lovaza 1 g (take), tramadol 50 mg (take if needed), Trulicity (take)

## 2019-04-02 ENCOUNTER — TELEPHONE (OUTPATIENT)
Dept: CARDIOLOGY | Facility: CLINIC | Age: 59
End: 2019-04-02

## 2019-04-02 DIAGNOSIS — Z79.4 TYPE 2 DIABETES MELLITUS WITH DIABETIC PERIPHERAL ANGIOPATHY WITHOUT GANGRENE, WITH LONG-TERM CURRENT USE OF INSULIN: ICD-10-CM

## 2019-04-02 DIAGNOSIS — I25.10 CORONARY ARTERY DISEASE DUE TO LIPID RICH PLAQUE: Primary | ICD-10-CM

## 2019-04-02 DIAGNOSIS — E11.59 HYPERTENSION ASSOCIATED WITH DIABETES: ICD-10-CM

## 2019-04-02 DIAGNOSIS — I15.2 HYPERTENSION ASSOCIATED WITH DIABETES: ICD-10-CM

## 2019-04-02 DIAGNOSIS — T82.855S CORONARY STENT RESTENOSIS, SEQUELA: ICD-10-CM

## 2019-04-02 DIAGNOSIS — I65.23 CAROTID STENOSIS, BILATERAL: ICD-10-CM

## 2019-04-02 DIAGNOSIS — E11.51 TYPE 2 DIABETES MELLITUS WITH DIABETIC PERIPHERAL ANGIOPATHY WITHOUT GANGRENE, UNSPECIFIED WHETHER LONG TERM INSULIN USE: ICD-10-CM

## 2019-04-02 DIAGNOSIS — I25.83 CORONARY ARTERY DISEASE DUE TO LIPID RICH PLAQUE: Primary | ICD-10-CM

## 2019-04-02 DIAGNOSIS — E11.51 TYPE 2 DIABETES MELLITUS WITH DIABETIC PERIPHERAL ANGIOPATHY WITHOUT GANGRENE, WITH LONG-TERM CURRENT USE OF INSULIN: ICD-10-CM

## 2019-04-02 DIAGNOSIS — E78.2 MIXED HYPERLIPIDEMIA: ICD-10-CM

## 2019-04-04 ENCOUNTER — TELEPHONE (OUTPATIENT)
Dept: NEUROSURGERY | Facility: CLINIC | Age: 59
End: 2019-04-04

## 2019-04-04 ENCOUNTER — HOSPITAL ENCOUNTER (OUTPATIENT)
Dept: CARDIOLOGY | Facility: CLINIC | Age: 59
Discharge: HOME | End: 2019-04-04
Attending: ANESTHESIOLOGY
Payer: MEDICARE

## 2019-04-04 ENCOUNTER — HOSPITAL ENCOUNTER (OUTPATIENT)
Dept: PREADMISSION TESTING | Facility: HOSPITAL | Age: 59
Discharge: HOME | End: 2019-04-04
Attending: ANESTHESIOLOGY
Payer: MEDICARE

## 2019-04-04 VITALS
BODY MASS INDEX: 27.31 KG/M2 | DIASTOLIC BLOOD PRESSURE: 78 MMHG | WEIGHT: 160 LBS | HEIGHT: 64 IN | OXYGEN SATURATION: 100 % | RESPIRATION RATE: 16 BRPM | SYSTOLIC BLOOD PRESSURE: 165 MMHG | HEART RATE: 88 BPM | TEMPERATURE: 98 F

## 2019-04-04 DIAGNOSIS — M51.36 DDD (DEGENERATIVE DISC DISEASE), LUMBAR: Primary | ICD-10-CM

## 2019-04-04 DIAGNOSIS — Z01.818 PREOPERATIVE TESTING: ICD-10-CM

## 2019-04-04 PROCEDURE — 93000 EKG 12-LEAD: ICD-10-PCS | Mod: S$GLB,,, | Performed by: INTERNAL MEDICINE

## 2019-04-04 PROCEDURE — 93000 ELECTROCARDIOGRAM COMPLETE: CPT | Mod: S$GLB,,, | Performed by: INTERNAL MEDICINE

## 2019-04-04 NOTE — TELEPHONE ENCOUNTER
----- Message from Ej Cox RN sent at 4/4/2019 11:17 AM CDT -----  Regarding: Procedure Clarification  Ms. Huff was seen in the [facility] today for her preop anesthetic eval. She has question concerning her ACDF procedure. She was under the impression that she was only having a lumbar fusion done. She is requesting a phone call for clarification.    Thanks,  Ej Cox RN  [facility]  Ext. [phone]

## 2019-04-04 NOTE — DISCHARGE INSTRUCTIONS
Your surgery has been scheduled for:__________________________________________    You should report to:  ____[facility], located on the [place] side of the first floor of the           [facility] ([phone])  ____[facility], located on the [facility] side of the            Second floor of the [facility] ([phone])  ____3rd Floor [facility] located on the [facility] side of the [facility] [phone]  Please Note   - Tell your doctor if you take Aspirin, products containing Aspirin, herbal medications  or blood thinners, such as Coumadin, Ticlid, or Plavix.  (Consult your provider regarding holding or stopping before surgery).  - Arrange for someone to drive you home following surgery.  You will not be allowed to leave the surgical facility alone or drive yourself home following sedation and anesthesia.  Before Surgery  - Stop taking all herbal medications 14days prior to surgery  - No Motrin/Advil (Ibuprofen) 7 days before surgery  - No Aleve (Naproxen) 7 days before surgery  - Stop Taking Asprin, products containing Asprin _____days before surgery  - Stop taking blood thinners_______days before surgery  - No Goody's/BC  Powder 7 days before surgery  - Refrain from drinking alcoholic beverages for 24hours before and after surgery  - Stop or limit smoking _________days before surgery  - You may take Tylenol for pain  Night before Surgery  Stop ALL solid food, gum, candy (including vitamins) 8 hours before arrival time.  (Please note: If your surgeon gives you different eating and drinking instructions, please follow surgeon's directions.)  Stop all CLOUDY liquids: coffee with creamer, formula, tube feeds, cloudy juices, non-human milk and breast milk with additives, 6 hours prior to arrival time.  Stop plain breast milk 4 hours prior to arrival time.  The patient should be ENCOURAGED to drink carbohydrate-rich  clear liquids (sports drinks, clear juices) until 2 hours prior to arrival time.  CLEAR liquids include only water, black coffee NO creamer, clear oral rehydration drinks, clear sports drinks or clear fruit juices (no orange juice, no pulpy juices, no apple cider). Advise patients if they can read newsprint through the liquid, it qualifies as clear liquid.   IF IN DOUBT, drink water instead.   - Take a shower or bath (shower is recommended).  Bathe with Hibiclens soap or an antibacterial soap from the neck down.  If not supplied by your surgeon, hibiclens soap will need to be purchased over the counter in pharmacy.  Rinse soap off thoroughly.  - Shampoo your hair with your regular shampoo  The Day of Surgery  · NOTHING TO  DRINK 2 hours before arrival time. If you are told to take medication on the morning of surgery, it may be taken with a sip of water.   - Take another bath or shower with hibiclens or any antibacterial soap, to reduce the chance of infection.  - Take heart and blood pressure medications with a small sip of water, as advised by the perioperative team.  - Do not take fluid pills  - You may brush your teeth and rinse your mouth, but do not swall any additional water.   - Do not apply perfumes, powder, body lotions or deodorant on the day of surgery.  - Nail polish should be removed.  - Do not wear makeup or moisturizer  - Wear comfortable clothes, such as a button front shirt and loose fitting pants.  - Leave all jewelry, including body piercings, and valuables at home.    - Bring any devices you will neeed after surgery such as crutches or canes.  - If you have sleep apnea, please bring your CPAP machine  In the event that your physical condition changes including the onset of a cold or respiratory illness, or if you have to delay or cancel your surgery, please notify your surgeon.  Anesthesia: General Anesthesia     You are watched continuously during your procedure by your anesthesia provider.      Youre due to have surgery. During surgery, youll be given medicine called anesthesia or anesthetic. This will keep you comfortable and pain-free. Your anesthesia provider will use general anesthesia.  What is general anesthesia?  General anesthesia puts you into a state like deep sleep. It goes into the bloodstream (IV anesthetics), into the lungs (gas anesthetics), or both. You feel nothing during the procedure. You will not remember it. During the procedure, the anesthesia provider monitors you continuously. He or she checks your heart rate and rhythm, blood pressure, breathing, and blood oxygen.  · IV anesthetics. IV anesthetics are given through an IV line in your arm. Theyre often given first. This is so you are asleep before a gas anesthetic is started. Some kinds of IV anesthetics relieve pain. Others relax you. Your doctor will decide which kind is best in your case.  · Gas anesthetics. Gas anesthetics are breathed into the lungs. They are often used to keep you asleep. They can be given through a facemask or a tube placed in your larynx or trachea (breathing tube).  ? If you have a facemask, your anesthesia provider will most likely place it over your nose and mouth while youre still awake. Youll breathe oxygen through the mask as your IV anesthetic is started. Gas anesthetic may be added through the mask.  ? If you have a tube in the larynx or trachea, it will be inserted into your throat after youre asleep.  Anesthesia tools and medicines  You will likely have:  · IV anesthetics. These are put into an IV line into your bloodstream.  · Gas anesthetics. You breathe these anesthetics into your lungs, where they pass into your bloodstream.  · Pulse oximeter. This is a small clip that is attached to the end of your finger. This measures your blood oxygen level.  · Electrocardiography leads (electrodes). These are small sticky pads that are placed on your chest. They record your heart rate and  rhythm.  · Blood pressure cuff. This reads your blood pressure.  Risks and possible complications  General anesthesia has some risks. These include:  · Breathing problems  · Nausea and vomiting  · Sore throat or hoarseness (usually temporary)  · Allergic reaction to the anesthetic  · Irregular heartbeat (rare)  · Cardiac arrest (rare)   Anesthesia safety  · Follow all instructions you are given for how long not to eat or drink before your procedure.  · Be sure your doctor knows what medicines and drugs you take. This includes over-the-counter medicines, herbs, supplements, alcohol or other drugs. You will be asked when those were last taken.  · Have an adult family member or friend drive you home after the procedure.  · For the first 24 hours after your surgery:  ? Do not drive or use heavy equipment.  ? Do not make important decisions or sign legal documents. If important decisions or signing legal documents is necessary during the first 24 hours after surgery, have a trusted family member or spouse act on your behalf.  ? Avoid alcohol.  ? Have a responsible adult stay with you. He or she can watch for problems and help keep you safe.  Date Last Reviewed: 12/1/2016  © 1625-9842 [employer]. [address] [place] [address], [place] [address]. All rights reserved. This information is not intended as a substitute for professional medical care. Always follow your healthcare professional's instructions

## 2019-04-10 ENCOUNTER — TELEPHONE (OUTPATIENT)
Dept: NEUROSURGERY | Facility: CLINIC | Age: 59
End: 2019-04-10

## 2019-04-10 ENCOUNTER — ANESTHESIA EVENT (OUTPATIENT)
Dept: SURGERY | Facility: HOSPITAL | Age: 59
DRG: 454 | End: 2019-04-10
Payer: MEDICARE

## 2019-04-10 NOTE — TELEPHONE ENCOUNTER
----- Message from Sharon Jimenes sent at 4/10/2019  9:25 AM CDT -----  Hi please disregard the previous message as the surgery is now approved. thanks

## 2019-04-10 NOTE — TELEPHONE ENCOUNTER
----- Message from Sharon Jimenes sent at 4/10/2019  8:26 AM CDT -----  Good Morning    The approval for Ms Huff upcoming surgery is currently still pending for review. Please advise if the patients surgery is medically urgent? thanks

## 2019-04-11 ENCOUNTER — TELEPHONE (OUTPATIENT)
Dept: NEUROSURGERY | Facility: CLINIC | Age: 59
End: 2019-04-11

## 2019-04-11 NOTE — TELEPHONE ENCOUNTER
Spoke to patient instructed to arrive at 930 report to the 2nd floor and check in, NPO after midnight including gum and mints, leave all valuables home, take meds as instructed. VU

## 2019-04-12 ENCOUNTER — ANESTHESIA (OUTPATIENT)
Dept: ENDOSCOPY | Facility: HOSPITAL | Age: 59
End: 2019-04-12

## 2019-04-12 ENCOUNTER — ANESTHESIA (OUTPATIENT)
Dept: SURGERY | Facility: HOSPITAL | Age: 59
DRG: 454 | End: 2019-04-12
Payer: MEDICARE

## 2019-04-12 ENCOUNTER — HOSPITAL ENCOUNTER (INPATIENT)
Facility: HOSPITAL | Age: 59
LOS: 2 days | Discharge: HOME | DRG: 454 | End: 2019-04-14
Attending: NEUROLOGICAL SURGERY | Admitting: NEUROLOGICAL SURGERY
Payer: MEDICARE

## 2019-04-12 DIAGNOSIS — M54.17 LUMBOSACRAL RADICULOPATHY AT L5: ICD-10-CM

## 2019-04-12 DIAGNOSIS — M54.16 LUMBAR RADICULOPATHY: Primary | ICD-10-CM

## 2019-04-12 DIAGNOSIS — M47.812 CERVICAL ARTHRITIS: Chronic | ICD-10-CM

## 2019-04-12 LAB
POCT GLUCOSE: 164 MG/DL (ref 70–110)
POCT GLUCOSE: 219 MG/DL (ref 70–110)

## 2019-04-12 PROCEDURE — D9220A PRA ANESTHESIA: ICD-10-PCS | Mod: CRNA,,, | Performed by: NURSE ANESTHETIST, CERTIFIED REGISTERED

## 2019-04-12 PROCEDURE — 37000008 HC ANESTHESIA 1ST 15 MINUTES: Performed by: NEUROLOGICAL SURGERY

## 2019-04-12 PROCEDURE — 63600175 PHARM REV CODE 636 W HCPCS: Performed by: NURSE ANESTHETIST, CERTIFIED REGISTERED

## 2019-04-12 PROCEDURE — 20930 PR ALLOGRAFT FOR SPINE SURGERY ONLY MORSELIZED: ICD-10-PCS | Mod: ,,, | Performed by: NEUROLOGICAL SURGERY

## 2019-04-12 PROCEDURE — 20930 SP BONE ALGRFT MORSEL ADD-ON: CPT | Mod: ,,, | Performed by: NEUROLOGICAL SURGERY

## 2019-04-12 PROCEDURE — 27201037 HC PRESSURE MONITORING SET UP

## 2019-04-12 PROCEDURE — 36620 ARTERIAL: ICD-10-PCS | Mod: 59,,, | Performed by: ANESTHESIOLOGY

## 2019-04-12 PROCEDURE — 63600175 PHARM REV CODE 636 W HCPCS: Mod: JG | Performed by: NURSE ANESTHETIST, CERTIFIED REGISTERED

## 2019-04-12 PROCEDURE — 22840 PR POSTERIOR NON-SEGMENTAL INSTRUMENTATION: ICD-10-PCS | Mod: ,,, | Performed by: NEUROLOGICAL SURGERY

## 2019-04-12 PROCEDURE — C2617 STENT, NON-COR, TEM W/O DEL: HCPCS | Performed by: NEUROLOGICAL SURGERY

## 2019-04-12 PROCEDURE — C1769 GUIDE WIRE: HCPCS | Performed by: NEUROLOGICAL SURGERY

## 2019-04-12 PROCEDURE — 25000003 PHARM REV CODE 250: Performed by: PAIN MEDICINE

## 2019-04-12 PROCEDURE — 71000039 HC RECOVERY, EACH ADD'L HOUR: Performed by: NEUROLOGICAL SURGERY

## 2019-04-12 PROCEDURE — 22853 PR INSERT BIOMECH DEV W/INTERBODY ARTHRODESIS, EA CONTIGUOUS DEFECT: ICD-10-PCS | Mod: ,,, | Performed by: NEUROLOGICAL SURGERY

## 2019-04-12 PROCEDURE — 22853 INSJ BIOMECHANICAL DEVICE: CPT | Mod: ,,, | Performed by: NEUROLOGICAL SURGERY

## 2019-04-12 PROCEDURE — 25000003 PHARM REV CODE 250: Performed by: NURSE ANESTHETIST, CERTIFIED REGISTERED

## 2019-04-12 PROCEDURE — D9220A PRA ANESTHESIA: Mod: CRNA,,, | Performed by: NURSE ANESTHETIST, CERTIFIED REGISTERED

## 2019-04-12 PROCEDURE — 63600175 PHARM REV CODE 636 W HCPCS

## 2019-04-12 PROCEDURE — C1729 CATH, DRAINAGE: HCPCS | Performed by: NEUROLOGICAL SURGERY

## 2019-04-12 PROCEDURE — 63600175 PHARM REV CODE 636 W HCPCS: Performed by: STUDENT IN AN ORGANIZED HEALTH CARE EDUCATION/TRAINING PROGRAM

## 2019-04-12 PROCEDURE — 27201423 OPTIME MED/SURG SUP & DEVICES STERILE SUPPLY: Performed by: NEUROLOGICAL SURGERY

## 2019-04-12 PROCEDURE — 37000009 HC ANESTHESIA EA ADD 15 MINS: Performed by: NEUROLOGICAL SURGERY

## 2019-04-12 PROCEDURE — 25000003 PHARM REV CODE 250: Performed by: STUDENT IN AN ORGANIZED HEALTH CARE EDUCATION/TRAINING PROGRAM

## 2019-04-12 PROCEDURE — 27800903 OPTIME MED/SURG SUP & DEVICES OTHER IMPLANTS: Performed by: NEUROLOGICAL SURGERY

## 2019-04-12 PROCEDURE — 82962 GLUCOSE BLOOD TEST: CPT | Performed by: NEUROLOGICAL SURGERY

## 2019-04-12 PROCEDURE — 71000033 HC RECOVERY, INTIAL HOUR: Performed by: NEUROLOGICAL SURGERY

## 2019-04-12 PROCEDURE — 50760 URETEROURETEROSTOMY: CPT | Mod: 52,LT,, | Performed by: UROLOGY

## 2019-04-12 PROCEDURE — 63600175 PHARM REV CODE 636 W HCPCS: Performed by: NEUROLOGICAL SURGERY

## 2019-04-12 PROCEDURE — 22558 PR ARTHRODESIS ANT INTERBODY MIN DISCECTOMY,LUMBAR: ICD-10-PCS | Mod: ,,, | Performed by: NEUROLOGICAL SURGERY

## 2019-04-12 PROCEDURE — 36000711: Performed by: NEUROLOGICAL SURGERY

## 2019-04-12 PROCEDURE — C1713 ANCHOR/SCREW BN/BN,TIS/BN: HCPCS | Performed by: NEUROLOGICAL SURGERY

## 2019-04-12 PROCEDURE — 22840 INSERT SPINE FIXATION DEVICE: CPT | Mod: ,,, | Performed by: NEUROLOGICAL SURGERY

## 2019-04-12 PROCEDURE — 12000002 HC ACUTE/MED SURGE SEMI-PRIVATE ROOM

## 2019-04-12 PROCEDURE — D9220A PRA ANESTHESIA: ICD-10-PCS | Mod: ANES,,, | Performed by: ANESTHESIOLOGY

## 2019-04-12 PROCEDURE — D9220A PRA ANESTHESIA: Mod: ANES,,, | Performed by: ANESTHESIOLOGY

## 2019-04-12 PROCEDURE — 50760 PR ANASTOMSIS OF URETER: ICD-10-PCS | Mod: 52,LT,, | Performed by: UROLOGY

## 2019-04-12 PROCEDURE — C1758 CATHETER, URETERAL: HCPCS | Performed by: NEUROLOGICAL SURGERY

## 2019-04-12 PROCEDURE — 25000003 PHARM REV CODE 250: Performed by: NEUROLOGICAL SURGERY

## 2019-04-12 PROCEDURE — 36000710: Performed by: NEUROLOGICAL SURGERY

## 2019-04-12 PROCEDURE — 36620 INSERTION CATHETER ARTERY: CPT | Mod: 59,,, | Performed by: ANESTHESIOLOGY

## 2019-04-12 PROCEDURE — 22558 ARTHRD ANT NTRBD MIN DSC LUM: CPT | Mod: ,,, | Performed by: NEUROLOGICAL SURGERY

## 2019-04-12 DEVICE — TULIP CREO MIS MOD POLY 30MM: Type: IMPLANTABLE DEVICE | Site: SPINE LUMBAR | Status: FUNCTIONAL

## 2019-04-12 DEVICE — STENT URETERAL UNIV 6FR 24CM
Type: IMPLANTABLE DEVICE | Site: URETER | Status: NON-FUNCTIONAL
Removed: 2020-04-16

## 2019-04-12 DEVICE — PUTTY DBX 5CC: Type: IMPLANTABLE DEVICE | Site: SPINE LUMBAR | Status: FUNCTIONAL

## 2019-04-12 DEVICE — PUTTY DBX 10CC: Type: IMPLANTABLE DEVICE | Site: SPINE LUMBAR | Status: FUNCTIONAL

## 2019-04-12 DEVICE — IMPLANTABLE DEVICE: Type: IMPLANTABLE DEVICE | Site: SPINE LUMBAR | Status: FUNCTIONAL

## 2019-04-12 DEVICE — SPACER SPINAL 18X50-8-15MM: Type: IMPLANTABLE DEVICE | Site: SPINE LUMBAR | Status: FUNCTIONAL

## 2019-04-12 DEVICE — CAP LOCKING CREO MIS: Type: IMPLANTABLE DEVICE | Site: SPINE LUMBAR | Status: FUNCTIONAL

## 2019-04-12 RX ORDER — SUCCINYLCHOLINE CHLORIDE 20 MG/ML
INJECTION INTRAMUSCULAR; INTRAVENOUS
Status: DISCONTINUED | OUTPATIENT
Start: 2019-04-12 | End: 2019-04-12

## 2019-04-12 RX ORDER — AMOXICILLIN 250 MG
2 CAPSULE ORAL NIGHTLY PRN
Status: DISCONTINUED | OUTPATIENT
Start: 2019-04-13 | End: 2019-04-14 | Stop reason: HOSPADM

## 2019-04-12 RX ORDER — PROPOFOL 10 MG/ML
VIAL (ML) INTRAVENOUS
Status: DISCONTINUED | OUTPATIENT
Start: 2019-04-12 | End: 2019-04-12

## 2019-04-12 RX ORDER — AMLODIPINE BESYLATE 10 MG/1
10 TABLET ORAL DAILY
Status: DISCONTINUED | OUTPATIENT
Start: 2019-04-13 | End: 2019-04-14 | Stop reason: HOSPADM

## 2019-04-12 RX ORDER — FENTANYL CITRATE 50 UG/ML
25 INJECTION, SOLUTION INTRAMUSCULAR; INTRAVENOUS EVERY 5 MIN PRN
Status: DISCONTINUED | OUTPATIENT
Start: 2019-04-12 | End: 2019-04-13 | Stop reason: HOSPADM

## 2019-04-12 RX ORDER — POLYETHYLENE GLYCOL 3350 17 G/17G
17 POWDER, FOR SOLUTION ORAL DAILY
Status: DISCONTINUED | OUTPATIENT
Start: 2019-04-13 | End: 2019-04-14 | Stop reason: HOSPADM

## 2019-04-12 RX ORDER — FENTANYL CITRATE 50 UG/ML
INJECTION, SOLUTION INTRAMUSCULAR; INTRAVENOUS
Status: DISCONTINUED | OUTPATIENT
Start: 2019-04-12 | End: 2019-04-12

## 2019-04-12 RX ORDER — ALBUTEROL SULFATE 90 UG/1
2 AEROSOL, METERED RESPIRATORY (INHALATION) EVERY 6 HOURS PRN
Status: DISCONTINUED | OUTPATIENT
Start: 2019-04-13 | End: 2019-04-14 | Stop reason: HOSPADM

## 2019-04-12 RX ORDER — HYDRALAZINE HYDROCHLORIDE 20 MG/ML
10 INJECTION INTRAMUSCULAR; INTRAVENOUS EVERY 10 MIN PRN
Status: DISCONTINUED | OUTPATIENT
Start: 2019-04-13 | End: 2019-04-14 | Stop reason: HOSPADM

## 2019-04-12 RX ORDER — OXYCODONE AND ACETAMINOPHEN 5; 325 MG/1; MG/1
1 TABLET ORAL EVERY 4 HOURS PRN
Status: DISCONTINUED | OUTPATIENT
Start: 2019-04-13 | End: 2019-04-14 | Stop reason: HOSPADM

## 2019-04-12 RX ORDER — METHYLENE BLUE 5 MG/ML
INJECTION INTRAVENOUS
Status: DISCONTINUED | OUTPATIENT
Start: 2019-04-12 | End: 2019-04-12

## 2019-04-12 RX ORDER — ONDANSETRON 2 MG/ML
INJECTION INTRAMUSCULAR; INTRAVENOUS
Status: DISCONTINUED | OUTPATIENT
Start: 2019-04-12 | End: 2019-04-12

## 2019-04-12 RX ORDER — MUPIROCIN 20 MG/G
OINTMENT TOPICAL
Status: DISCONTINUED | OUTPATIENT
Start: 2019-04-12 | End: 2019-04-13 | Stop reason: HOSPADM

## 2019-04-12 RX ORDER — IBUPROFEN 200 MG
16 TABLET ORAL
Status: DISCONTINUED | OUTPATIENT
Start: 2019-04-13 | End: 2019-04-14 | Stop reason: HOSPADM

## 2019-04-12 RX ORDER — ONDANSETRON 2 MG/ML
4 INJECTION INTRAMUSCULAR; INTRAVENOUS EVERY 6 HOURS PRN
Status: DISCONTINUED | OUTPATIENT
Start: 2019-04-13 | End: 2019-04-14 | Stop reason: HOSPADM

## 2019-04-12 RX ORDER — CYCLOBENZAPRINE HCL 5 MG
10 TABLET ORAL 3 TIMES DAILY
Status: DISCONTINUED | OUTPATIENT
Start: 2019-04-13 | End: 2019-04-14 | Stop reason: HOSPADM

## 2019-04-12 RX ORDER — HYDROMORPHONE HYDROCHLORIDE 1 MG/ML
1 INJECTION, SOLUTION INTRAMUSCULAR; INTRAVENOUS; SUBCUTANEOUS EVERY 4 HOURS PRN
Status: DISCONTINUED | OUTPATIENT
Start: 2019-04-13 | End: 2019-04-14 | Stop reason: HOSPADM

## 2019-04-12 RX ORDER — MEPERIDINE HYDROCHLORIDE 50 MG/ML
12.5 INJECTION INTRAMUSCULAR; INTRAVENOUS; SUBCUTANEOUS ONCE AS NEEDED
Status: DISCONTINUED | OUTPATIENT
Start: 2019-04-13 | End: 2019-04-13 | Stop reason: HOSPADM

## 2019-04-12 RX ORDER — LORAZEPAM 2 MG/ML
0.25 INJECTION INTRAMUSCULAR ONCE AS NEEDED
Status: DISCONTINUED | OUTPATIENT
Start: 2019-04-12 | End: 2019-04-13 | Stop reason: HOSPADM

## 2019-04-12 RX ORDER — CEFAZOLIN SODIUM 1 G/3ML
2 INJECTION, POWDER, FOR SOLUTION INTRAMUSCULAR; INTRAVENOUS
Status: DISCONTINUED | OUTPATIENT
Start: 2019-04-13 | End: 2019-04-13 | Stop reason: HOSPADM

## 2019-04-12 RX ORDER — KETAMINE HYDROCHLORIDE 10 MG/ML
INJECTION, SOLUTION INTRAMUSCULAR; INTRAVENOUS
Status: DISCONTINUED | OUTPATIENT
Start: 2019-04-12 | End: 2019-04-12

## 2019-04-12 RX ORDER — VANCOMYCIN HYDROCHLORIDE 1 G/20ML
INJECTION, POWDER, LYOPHILIZED, FOR SOLUTION INTRAVENOUS
Status: DISCONTINUED | OUTPATIENT
Start: 2019-04-12 | End: 2019-04-12 | Stop reason: HOSPADM

## 2019-04-12 RX ORDER — MIDAZOLAM HYDROCHLORIDE 1 MG/ML
INJECTION INTRAMUSCULAR; INTRAVENOUS
Status: DISCONTINUED | OUTPATIENT
Start: 2019-04-12 | End: 2019-04-12

## 2019-04-12 RX ORDER — LABETALOL HCL 20 MG/4 ML
10 SYRINGE (ML) INTRAVENOUS EVERY 10 MIN PRN
Status: DISCONTINUED | OUTPATIENT
Start: 2019-04-13 | End: 2019-04-14 | Stop reason: HOSPADM

## 2019-04-12 RX ORDER — MUPIROCIN 20 MG/G
1 OINTMENT TOPICAL
Status: COMPLETED | OUTPATIENT
Start: 2019-04-12 | End: 2019-04-12

## 2019-04-12 RX ORDER — SODIUM CHLORIDE 9 MG/ML
INJECTION, SOLUTION INTRAVENOUS CONTINUOUS
Status: DISCONTINUED | OUTPATIENT
Start: 2019-04-12 | End: 2019-04-14 | Stop reason: HOSPADM

## 2019-04-12 RX ORDER — HYDROMORPHONE HYDROCHLORIDE 1 MG/ML
INJECTION, SOLUTION INTRAMUSCULAR; INTRAVENOUS; SUBCUTANEOUS
Status: COMPLETED
Start: 2019-04-12 | End: 2019-04-12

## 2019-04-12 RX ORDER — BUPIVACAINE HYDROCHLORIDE AND EPINEPHRINE 5; 5 MG/ML; UG/ML
INJECTION, SOLUTION EPIDURAL; INTRACAUDAL; PERINEURAL
Status: DISCONTINUED | OUTPATIENT
Start: 2019-04-12 | End: 2019-04-12 | Stop reason: HOSPADM

## 2019-04-12 RX ORDER — BACITRACIN 50000 [IU]/1
INJECTION, POWDER, FOR SOLUTION INTRAMUSCULAR
Status: DISCONTINUED | OUTPATIENT
Start: 2019-04-12 | End: 2019-04-12 | Stop reason: HOSPADM

## 2019-04-12 RX ORDER — PHENYLEPHRINE HCL IN 0.9% NACL 1 MG/10 ML
SYRINGE (ML) INTRAVENOUS
Status: DISCONTINUED | OUTPATIENT
Start: 2019-04-12 | End: 2019-04-12

## 2019-04-12 RX ORDER — METOPROLOL SUCCINATE 100 MG/1
100 TABLET, EXTENDED RELEASE ORAL DAILY
Status: DISCONTINUED | OUTPATIENT
Start: 2019-04-13 | End: 2019-04-14 | Stop reason: HOSPADM

## 2019-04-12 RX ORDER — ROCURONIUM BROMIDE 10 MG/ML
INJECTION, SOLUTION INTRAVENOUS
Status: DISCONTINUED | OUTPATIENT
Start: 2019-04-12 | End: 2019-04-12

## 2019-04-12 RX ORDER — LIDOCAINE HCL/PF 100 MG/5ML
SYRINGE (ML) INTRAVENOUS
Status: DISCONTINUED | OUTPATIENT
Start: 2019-04-12 | End: 2019-04-12

## 2019-04-12 RX ORDER — DEXAMETHASONE SODIUM PHOSPHATE 4 MG/ML
INJECTION, SOLUTION INTRA-ARTICULAR; INTRALESIONAL; INTRAMUSCULAR; INTRAVENOUS; SOFT TISSUE
Status: DISCONTINUED | OUTPATIENT
Start: 2019-04-12 | End: 2019-04-12

## 2019-04-12 RX ORDER — HYDROMORPHONE HYDROCHLORIDE 1 MG/ML
0.2 INJECTION, SOLUTION INTRAMUSCULAR; INTRAVENOUS; SUBCUTANEOUS EVERY 5 MIN PRN
Status: DISCONTINUED | OUTPATIENT
Start: 2019-04-12 | End: 2019-04-13 | Stop reason: HOSPADM

## 2019-04-12 RX ORDER — ONDANSETRON 2 MG/ML
4 INJECTION INTRAMUSCULAR; INTRAVENOUS DAILY PRN
Status: DISCONTINUED | OUTPATIENT
Start: 2019-04-12 | End: 2019-04-13 | Stop reason: HOSPADM

## 2019-04-12 RX ORDER — CEFAZOLIN SODIUM 1 G/3ML
2 INJECTION, POWDER, FOR SOLUTION INTRAMUSCULAR; INTRAVENOUS
Status: DISCONTINUED | OUTPATIENT
Start: 2019-04-12 | End: 2019-04-12

## 2019-04-12 RX ORDER — SODIUM CHLORIDE 0.9 % (FLUSH) 0.9 %
3 SYRINGE (ML) INJECTION
Status: DISCONTINUED | OUTPATIENT
Start: 2019-04-13 | End: 2019-04-13 | Stop reason: HOSPADM

## 2019-04-12 RX ORDER — GENTAMICIN SULFATE 80 MG/100ML
INJECTION, SOLUTION INTRAVENOUS
Status: DISCONTINUED | OUTPATIENT
Start: 2019-04-12 | End: 2019-04-12

## 2019-04-12 RX ADMIN — FENTANYL CITRATE 50 MCG: 50 INJECTION, SOLUTION INTRAMUSCULAR; INTRAVENOUS at 09:04

## 2019-04-12 RX ADMIN — PROPOFOL 140 MG: 10 INJECTION, EMULSION INTRAVENOUS at 04:04

## 2019-04-12 RX ADMIN — HYDROMORPHONE HYDROCHLORIDE 0.2 MG: 1 INJECTION, SOLUTION INTRAMUSCULAR; INTRAVENOUS; SUBCUTANEOUS at 11:04

## 2019-04-12 RX ADMIN — Medication 100 MG: at 07:04

## 2019-04-12 RX ADMIN — Medication 100 MG: at 05:04

## 2019-04-12 RX ADMIN — PHENYLEPHRINE HYDROCHLORIDE 0.2 MCG/KG/MIN: 10 INJECTION INTRAVENOUS at 05:04

## 2019-04-12 RX ADMIN — Medication 100 MG: at 08:04

## 2019-04-12 RX ADMIN — LIDOCAINE HYDROCHLORIDE 100 MG: 20 INJECTION, SOLUTION INTRAVENOUS at 04:04

## 2019-04-12 RX ADMIN — GENTAMICIN SULFATE 80 MG: 80 INJECTION, SOLUTION INTRAVENOUS at 04:04

## 2019-04-12 RX ADMIN — FENTANYL CITRATE 50 MCG: 50 INJECTION, SOLUTION INTRAMUSCULAR; INTRAVENOUS at 10:04

## 2019-04-12 RX ADMIN — KETAMINE HYDROCHLORIDE 25 MG: 10 INJECTION, SOLUTION INTRAMUSCULAR; INTRAVENOUS at 05:04

## 2019-04-12 RX ADMIN — PROPOFOL 20 MG: 10 INJECTION, EMULSION INTRAVENOUS at 06:04

## 2019-04-12 RX ADMIN — ONDANSETRON 4 MG: 2 INJECTION INTRAMUSCULAR; INTRAVENOUS at 10:04

## 2019-04-12 RX ADMIN — Medication 100 MG: at 06:04

## 2019-04-12 RX ADMIN — MIDAZOLAM HYDROCHLORIDE 2 MG: 1 INJECTION, SOLUTION INTRAMUSCULAR; INTRAVENOUS at 04:04

## 2019-04-12 RX ADMIN — Medication 100 MG: at 04:04

## 2019-04-12 RX ADMIN — KETAMINE HYDROCHLORIDE 15 MG: 10 INJECTION, SOLUTION INTRAMUSCULAR; INTRAVENOUS at 06:04

## 2019-04-12 RX ADMIN — Medication 50 MG: at 08:04

## 2019-04-12 RX ADMIN — ROCURONIUM BROMIDE 20 MG: 10 INJECTION, SOLUTION INTRAVENOUS at 05:04

## 2019-04-12 RX ADMIN — FENTANYL CITRATE 50 MCG: 50 INJECTION, SOLUTION INTRAMUSCULAR; INTRAVENOUS at 07:04

## 2019-04-12 RX ADMIN — FENTANYL CITRATE 50 MCG: 50 INJECTION, SOLUTION INTRAMUSCULAR; INTRAVENOUS at 11:04

## 2019-04-12 RX ADMIN — SUCCINYLCHOLINE CHLORIDE 140 MG: 20 INJECTION, SOLUTION INTRAMUSCULAR; INTRAVENOUS at 04:04

## 2019-04-12 RX ADMIN — DEXAMETHASONE SODIUM PHOSPHATE 4 MG: 4 INJECTION, SOLUTION INTRAMUSCULAR; INTRAVENOUS at 05:04

## 2019-04-12 RX ADMIN — PROPOFOL 50 MG: 10 INJECTION, EMULSION INTRAVENOUS at 05:04

## 2019-04-12 RX ADMIN — MUPIROCIN 1 G: 20 OINTMENT TOPICAL at 11:04

## 2019-04-12 RX ADMIN — SODIUM CHLORIDE, SODIUM GLUCONATE, SODIUM ACETATE, POTASSIUM CHLORIDE, MAGNESIUM CHLORIDE, SODIUM PHOSPHATE, DIBASIC, AND POTASSIUM PHOSPHATE: .53; .5; .37; .037; .03; .012; .00082 INJECTION, SOLUTION INTRAVENOUS at 05:04

## 2019-04-12 RX ADMIN — VANCOMYCIN HYDROCHLORIDE 1 G: 1 INJECTION, POWDER, LYOPHILIZED, FOR SOLUTION INTRAVENOUS at 05:04

## 2019-04-12 RX ADMIN — OXYCODONE HYDROCHLORIDE AND ACETAMINOPHEN 1 TABLET: 5; 325 TABLET ORAL at 11:04

## 2019-04-12 RX ADMIN — KETAMINE HYDROCHLORIDE 10 MG: 10 INJECTION, SOLUTION INTRAMUSCULAR; INTRAVENOUS at 09:04

## 2019-04-12 RX ADMIN — SODIUM CHLORIDE, SODIUM GLUCONATE, SODIUM ACETATE, POTASSIUM CHLORIDE, MAGNESIUM CHLORIDE, SODIUM PHOSPHATE, DIBASIC, AND POTASSIUM PHOSPHATE: .53; .5; .37; .037; .03; .012; .00082 INJECTION, SOLUTION INTRAVENOUS at 09:04

## 2019-04-12 RX ADMIN — FENTANYL CITRATE 100 MCG: 50 INJECTION, SOLUTION INTRAMUSCULAR; INTRAVENOUS at 04:04

## 2019-04-12 RX ADMIN — SUGAMMADEX 200 MG: 100 INJECTION, SOLUTION INTRAVENOUS at 06:04

## 2019-04-12 RX ADMIN — PROPOFOL 30 MG: 10 INJECTION, EMULSION INTRAVENOUS at 05:04

## 2019-04-12 RX ADMIN — ROCURONIUM BROMIDE 5 MG: 10 INJECTION, SOLUTION INTRAVENOUS at 04:04

## 2019-04-12 RX ADMIN — METHYLENE BLUE 50 MG: 5 INJECTION INTRAVENOUS at 07:04

## 2019-04-12 RX ADMIN — SODIUM CHLORIDE: 0.9 INJECTION, SOLUTION INTRAVENOUS at 03:04

## 2019-04-12 NOTE — H&P
The patient has been examined and the H&P has been reviewed:     I concur with the findings and no changes have occurred since H&P was written.        To the OR as planned      Jeff Meade MD  NSGY PGY-II                H&P        HISTORY OF PRESENT ILLNESS:   This is a very pleasant 58 y.o. female who has been complaining of low back pain in the L5-S1 area since 2016. The pain can reach 10/10. The pain radiates down the right leg in the L5 distribution. The pain is worse with standing walking and sitting for prolonged period of time. Her functional status and quality of life is affected by the pain. She tried physical therapy in 2016 without significant pain relief. She does wear a back brace for pain relief. She had multiple spinal epidural steroid injection. Her last epidural at L5-S1 gave her 6 hr of complete pain relief.   Low Back Pain Scale   R Low Back-Pain Score: 7   R Low Back-Pain Intensity: Pain killers have no effect on the pain and I do not use them   R Low Back-Pain Score: It is painful to look after myself and I am slow and careful   Low Back-Lifting: Pain prevents me from lifting heavy weights off the floor, but I can manage if they are conveniently positioned for example on a table   Low Back-Walking: Pain prevents me walking more than .25 mile   Low Back-Sitting: Pain prevents me from sitting more than 1 hour   Low Back-Standing: I avoid standing because it increases the pain immediately   Low Back-Sleeping: Because of pain my normal nights sleep is reduced by less than one quarter   Low Back-Social Life: Pain has no significant effect on my social life apart from limiting my more en   Low Back-Traveling: I have extra pain while traveling but it does not compel me to seek alternate forms of travel   Low Back-Changing Degree of Pain: My pain is gradually worsening     PAST MEDICAL HISTORY:        Active Ambulatory Problems    Diagnosis Date Noted    Hypertension associated with  diabetes     Hyperlipidemia     CAD (coronary artery disease)     Sleep apnea     Carotid stenosis, bilateral 10/24/2012    Non compliance with medical treatment 06/19/2013    Coronary stent restenosis 02/26/2014    S/P coronary artery stent placement 02/26/2014    Nuclear sclerosis - Right Eye 03/18/2014    Primary localized osteoarthrosis, lower leg 06/18/2014    Chronic sinusitis 05/07/2015    PSC (posterior subcapsular cataract), right 08/17/2015    DME (diabetic macular edema) 08/17/2015    Refractive error 08/17/2015    Pseudophakia 08/17/2015    Senile nuclear sclerosis 09/01/2015    Type 2 diabetes mellitus with diabetic peripheral angiopathy without gangrene 02/24/2016    Diabetic peripheral neuropathy associated with type 2 diabetes mellitus 02/24/2016    Cervical arthritis 08/29/2016    Neurogenic claudication 08/29/2016    Lumbar herniated disc 10/24/2016    Fatty liver disease, nonalcoholic 11/01/2017    Arthritis of lumbar spine 07/23/2018    Chronic pain 09/25/2018    Lumbar radiculopathy 02/06/2019          Resolved Ambulatory Problems    Diagnosis Date Noted    Diabetes mellitus type II, uncontrolled 10/24/2012    Obesity 10/24/2012    Neck pain 03/07/2013    NSTEMI (non-ST elevated myocardial infarction) 02/17/2014    PAPA (acute kidney injury) 02/17/2014    Type II or unspecified type diabetes mellitus with other specified manifestations, uncontrolled 06/06/2014    Peripheral neuropathy 06/06/2014    Enthesopathy of hip region 06/18/2014    Type II or unspecified type diabetes mellitus with peripheral circulatory disorders, uncontrolled(250.72) 09/25/2014    Diabetes mellitus with peripheral artery disease 02/02/2015    Dysphagia 08/12/2016    Neck pain on right side 10/14/2016    Chronic bilateral low back pain with sciatica 10/14/2016    Decreased range of motion 10/14/2016    Decreased strength 10/14/2016    Decreased functional mobility 10/14/2016     Closed nondisplaced fracture of fifth metatarsal bone of right foot with routine healing 2017          Past Medical History:   Diagnosis Date    Anticoagulant long-term use     Asthma     Back pain     CAD (coronary artery disease)     Carotid artery stenosis     Diabetes mellitus type 2 in obese     HTN (hypertension), benign     Hyperlipidemia     Keloid cicatrix     NPDR (nonproliferative diabetic retinopathy) 2015    NSTEMI (non-ST elevated myocardial infarction)     Nuclear sclerosis - Right Eye 3/18/2014    Primary localized osteoarthrosis, lower leg 2014    Sleep apnea      PAST SURGICAL HISTORY:         Past Surgical History:   Procedure Laterality Date    CARDIAC CATHETERIZATION      cataract extraction left eye      cataracts      CATHETERIZATION, HEART, LEFT Left 2014    Performed by Wilman Kim MD at [facility] CATH LAB     SECTION, LOW TRANSVERSE      CORONARY ANGIOPLASTY      ESOPHAGOGASTRODUODENOSCOPY (EGD) N/A 2016    Performed by Gardenia Adamson MD at [facility] ENDO (4TH FLR)    EXCISION TURBINATE, SUBMUCOUS      HAND SURGERY Left     HAND SURGERY Right     torn ligament repair/ Dr. Yeboah    HYSTERECTOMY      Injection,steroid,epidural,transforaminal approach - Bilateral - S1 Bilateral 2018    Performed by Tl Abreu MD at [facility] PAIN MGT    Injection-steroid-epidural-caudal N/A 2019    Performed by Dave Bentley Jr., MD at [facility] PAIN MGT    INSERTION-INTRAOCULAR LENS (IOL) Right 2015    Performed by Good Domingo MD at [facility] OR 1ST FLR    left foot surgery      left wrist surgery      NASAL SEPTUM SURGERY  5/7/15    PHACOEMULSIFICATION-ASPIRATION-CATARACT Right 2015    Performed by Good Domingo MD at [facility] OR 1ST FLR    RESECTION-TURBINATES (SMR) N/A 2015    Performed by Dileep Dubois III, MD at [facility] OR 2ND FLR    rt elbow surgery      S/P LAD COATED STENT  2010    6 total      S/P OM1 STENT  08/2003    SEPTOPLASTY N/A 5/7/2015    Performed by Dileep Dubois III, MD at [facility] OR 2ND FLR    SINUS SURGERY      F.E.S.S.    SINUS SURGERY FUNCTIONAL ENDOSCOPIC WITH NAVIGATION WITH MAXILLARIES, ETHMOIDS, LEFT SPHENOID, LEFT LOLY N/A 5/7/2015    Performed by Dileep Dubois III, MD at [facility] OR 2ND FLR    TUBAL LIGATION       SOCIAL HISTORY:   Social History           Socioeconomic History    Marital status:      Spouse name: Shamir    Number of children: 2    Years of education: Not on file    Highest education level: Not on file   Social Needs    Financial resource strain: Not on file    Food insecurity - worry: Not on file    Food insecurity - inability: Not on file    Transportation needs - medical: Not on file    Transportation needs - non-medical: Not on file   Occupational History    Occupation: cafeteria     Employer: [employer]     Employer: [employer]     Employer: [employer]   Tobacco Use    Smoking status: Never Smoker    Smokeless tobacco: Never Used   Substance and Sexual Activity    Alcohol use: No     Alcohol/week: 0.0 oz    Drug use: No    Sexual activity: Yes     Partners: Male     Birth control/protection: Post-menopausal     Comment:    Other Topics Concern    Are you pregnant or think you may be? No    Breast-feeding No   Social History Narrative    . 2 children.      FAMILY HISTORY:         Family History   Problem Relation Age of Onset    Diabetes Mother     Heart disease Mother     Diabetes Father     Leukemia Father     leukemia    Heart attack Father     Diabetes Sister     Diabetes Brother     Diabetes Sister     No Known Problems Sister     No Known Problems Brother     No Known Problems Brother     No Known Problems Maternal Grandmother     No Known Problems Maternal Grandfather     No Known Problems Paternal Grandmother     No Known Problems Paternal Grandfather      "No Known Problems Son     No Known Problems Son     No Known Problems Maternal Aunt     No Known Problems Maternal Uncle     No Known Problems Paternal Aunt     No Known Problems Paternal Uncle     Colon cancer Neg Hx     Inflammatory bowel disease Neg Hx     Melanoma Neg Hx     Psoriasis Neg Hx     Lupus Neg Hx     Eczema Neg Hx     Acne Neg Hx     Amblyopia Neg Hx     Blindness Neg Hx     Cancer Neg Hx     Cataracts Neg Hx     Glaucoma Neg Hx     Hypertension Neg Hx     Macular degeneration Neg Hx     Retinal detachment Neg Hx     Strabismus Neg Hx     Stroke Neg Hx     Thyroid disease Neg Hx     Heart failure Neg Hx     Hyperlipidemia Neg Hx      CURRENTS MEDICATIONS:          Current Outpatient Medications on File Prior to Visit   Medication Sig Dispense Refill    ACCU-CHEK EDIN PLUS METER Misc       albuterol (PROVENTIL/VENTOLIN HFA) 90 mcg/actuation inhaler Inhale 2 puffs into the lungs every 6 (six) hours as needed for Wheezing. 1 each 11    alirocumab (PRALUENT PEN) 75 mg/mL PnIj Inject 1 mL (75 mg total) into the skin every 14 (fourteen) days. 6 mL 3    amLODIPine (NORVASC) 10 MG tablet TAKE 1 TABLET (10 MG TOTAL) BY MOUTH ONCE DAILY. 30 tablet 6    aspirin 81 mg Tab Take 81 mg by mouth. 1 Tablet Oral Every day      atorvastatin (LIPITOR) 40 MG tablet TAKE 1 TABLET (40 MG TOTAL) BY MOUTH ONCE DAILY. 30 tablet 11    azithromycin (Z-PHONG) 250 MG tablet Take 2 tablets by mouth on day 1; Take 1 tablet by mouth on days 2-5 6 tablet 0    BD INSULIN PEN NEEDLE UF MINI 31 x 3/16 " Ndle USE 4 TIMES A  each 11    blood sugar diagnostic (ACCU-CHEK EDIN PLUS TEST STRP) Strp TEST BLOOD SUGARS 4 TIMES DAILY 150 strip 11    chlorthalidone (HYGROTEN) 50 MG Tab Take 1 tablet (50 mg total) by mouth once daily. 90 tablet 3    cyclobenzaprine (FLEXERIL) 10 MG tablet TAKE 1 TABLET BY MOUTH 3 TIMES A DAY AS NEEDED FOR MUSCLE SPASMS. NO WORKING OR DRIVING ON THIS MED 45 tablet 5    " "dulaglutide (TRULICITY) 1.5 mg/0.5 mL PnIj Inject 1.5 mg into the skin every 7 days. 4 Syringe 6    esomeprazole (NEXIUM) 40 MG capsule TAKE 1 CAPSULE (40 MG TOTAL) BY MOUTH BEFORE BREAKFAST. 90 capsule 3    fenofibrate micronized (LOFIBRA) 134 MG Cap TAKE 1 CAPSULE (134 MG TOTAL) BY MOUTH BEFORE BREAKFAST. 90 capsule 3    flash glucose scanning reader (FREESTYLE MISTI 14 DAY READER) Misc 1 each by Misc.(Non-Drug; Combo Route) route once daily. 2 each 11    flash glucose sensor (FREESTYLE MISTI 14 DAY SENSOR) Kit 1 each by Misc.(Non-Drug; Combo Route) route once daily. 2 kit 11    gabapentin (NEURONTIN) 100 MG capsule Take 2 capsules (200 mg total) by mouth every evening. 60 capsule 11    insulin aspart U-100 (NOVOLOG FLEXPEN U-100 INSULIN) 100 unit/mL InPn pen INJECT 35 U AM, 45 U AT NOON, 35 U PM.150-200 +2, 201-250 +4, 251-300 +6, 301-350 +8, >350 +10 30 Syringe 1    insulin glargine, TOUJEO, (TOUJEO SOLOSTAR U-300 INSULIN) 300 unit/mL (1.5 mL) InPn pen Inject 50 Units into the skin 2 (two) times daily. 6 Syringe 6    INVOKANA 300 mg Tab tablet Take 1 tablet (300 mg total) by mouth once daily. 30 tablet 6    irbesartan (AVAPRO) 300 MG tablet Take 1 tablet (300 mg total) by mouth every evening. 90 tablet 3    lancets 30 gauge Misc       metoprolol succinate (TOPROL-XL) 100 MG 24 hr tablet TAKE 1 TABLET (100 MG TOTAL) BY MOUTH ONCE DAILY. 30 tablet 11    nitroGLYCERIN (NITROSTAT) 0.4 MG SL tablet Take one every 2-3 min till chestpain relief for 3 times and if still no relief, call MD or come to ED 30 tablet 11    omega-3 acid ethyl esters (LOVAZA) 1 gram capsule Take 1 g by mouth once daily.       pen needle, diabetic (BD ULTRA-FINE GRABIEL PEN NEEDLES) 32 gauge x 5/32" Ndle For use with insulin pens daily 4 times a day 100 each 11    prasugrel (EFFIENT) 10 mg Tab TAKE 1 TABLET (10 MG TOTAL) BY MOUTH ONCE DAILY. 30 tablet 10    tramadol (ULTRAM) 50 mg tablet Take 1 tablet (50 mg total) by mouth 3 " (three) times daily as needed for Pain. 60 tablet 1    zolpidem (AMBIEN) 5 MG Tab Take 1 tablet (5 mg total) by mouth nightly as needed. 30 tablet 2               Current Facility-Administered Medications on File Prior to Visit   Medication Dose Route Frequency Provider Last Rate Last Dose    0.9% NaCl infusion 500 mL Intravenous Continuous Dave Bentley Jr., MD      lidocaine (PF) 10 mg/ml (1%) injection 10 mg 1 mL Intradermal Once Dave Bentley Jr., MD       ALLERGIES:   Review of patient's allergies indicates:   No Known Allergies   REVIEW OF SYSTEMS:   Review of Systems   Constitutional: Negative for diaphoresis, fever and weight loss.   Respiratory: Negative for shortness of breath.   Cardiovascular: Negative for chest pain.   Gastrointestinal: Negative for blood in stool.   Genitourinary: Negative for hematuria.   Endo/Heme/Allergies: Does not bruise/bleed easily.   All other systems reviewed and are negative.     OBJECTIVE:     Physical Exam:   Vitals reviewed.   Constitutional: She appears well-developed and well-nourished.   Eyes: Pupils are equal, round, and reactive to light. Conjunctivae and EOM are normal.   Cardiovascular: Normal distal pulses and no edema.   Abdominal: Soft.   Skin: Skin displays no rash on trunk and no rash on extremities. Skin displays no lesions on trunk and no lesions on extremities.     Psych/Behavior: She is alert. She is oriented to person, place, and time. She has a normal mood and affect.     Musculoskeletal:   Neck: Range of motion is full.     Neurological:   DTRs: Tricep reflexes are 2+ on the right side and 2+ on the left side. Bicep reflexes are 2+ on the right side and 2+ on the left side. Brachioradialis reflexes are 2+ on the right side and 2+ on the left side. Patellar reflexes are 2+ on the right side and 2+ on the left side. Achilles reflexes are 2+ on the right side and 2+ on the left side.     Back Exam   Tenderness   The patient is experiencing no  tenderness.   Range of Motion   Extension: normal   Flexion: normal   Lateral bend right: normal   Lateral bend left: normal   Rotation right: normal   Rotation left: normal   Muscle Strength   Right Quadriceps: 5/5   Left Quadriceps: 5/5   Right Hamstrings: 5/5   Left Hamstrings: 5/5   Tests   Straight leg raise right: negative   Straight leg raise left: negative   Other   Toe walk: normal   Heel walk: normal     Neurologic Exam   Mental Status   Oriented to person, place, and time.   Speech: speech is normal   Level of consciousness: alert   Cranial Nerves   Cranial nerves II through XII intact.   CN III, IV, VI   Pupils are equal, round, and reactive to light.  Extraocular motions are normal.   Motor Exam   Muscle bulk: normal  Overall muscle tone: normal   Strength   Right deltoid: 5/5   Left deltoid: 5/5   Right biceps: 5/5   Left biceps: 5/5   Right triceps: 5/5   Left triceps: 5/5   Right wrist flexion: 5/5   Left wrist flexion: 5/5   Right wrist extension: 5/5   Left wrist extension: 5/5   Right interossei: 5/5   Left interossei: 5/5   Right iliopsoas: 5/5   Left iliopsoas: 5/5   Right quadriceps: 5/5   Left quadriceps: 5/5   Right hamstrin/5   Left hamstrin/5   Right anterior tibial: 5/5   Left anterior tibial: 5/5   Right posterior tibial: 5/5   Left posterior tibial: 5/5   Right peroneal: 5/5   Left peroneal: 5/5   Right gastroc: 5/5   Left gastroc: 5/5   Sensory Exam   Light touch normal.   Pinprick normal.   Gait, Coordination, and Reflexes   Gait   Gait: normal   Coordination   Finger to nose coordination: normal  Tandem walking coordination: normal   Reflexes   Right brachioradialis: 2+  Left brachioradialis: 2+  Right biceps: 2+  Left biceps: 2+  Right triceps: 2+  Left triceps: 2+  Right patellar: 2+  Left patellar: 2+  Right achilles: 2+  Left achilles: 2+  Right plantar: normal  Left plantar: normal  Right Crump: absent  Left Crump: absent  Right ankle clonus: absent  Left ankle  clonus: absent     DIAGNOSTIC DATA:   I personally interpreted the following imaging:   Lumbar spine MRI February 2019 compared to 2016: Severe degenerative disc disease at L5-S1, right moderate foraminal stenosis   ASSESMENT:   This is a 58 y.o. female with       Problem List Items Addressed This Visit       None                  Visit Diagnoses       DDD (degenerative disc disease), lumbosacral - Primary    Relevant Orders    X-Ray Lumbar Spine Ap Lateral w/Flex Ext    Foraminal stenosis of lumbosacral region     Relevant Orders    X-Ray Lumbar Spine Ap Lateral w/Flex Ext    Lumbosacral radiculopathy at L5     Relevant Orders    X-Ray Lumbar Spine Ap Lateral w/Flex Ext          PLAN:   I explained the natural history of the disease and all treatment options. I recommended a left anterior to the psoas L5-S1 interbody fusion with posterior instrumentation the goal of having more than 50% pain relief.   We have discussed the risks of surgery including bleeding, infection, failure of surgery, CSF leak, nerve root injury, spinal cord injury, weakness, paralysis, peripheral neuropathy, malplaced hardware, migration of hardware, non-union, need for reoperation. Patient understands the risks and would like to proceed with surgery.   The patient has increased perioperative risks because of these comorbidities: CADChinmay George MD   Cell:[phone]

## 2019-04-12 NOTE — ANESTHESIA PROCEDURE NOTES
Arterial    Diagnosis: radiculopathy    Patient location during procedure: done in OR  Procedure start time: 4/12/2019 4:40 PM  Procedure end time: 4/12/2019 4:55 PM  Staffing  Anesthesiologist: Gabino Herrera Jr., MD  Performed: anesthesiologist   Anesthesiologist was present at the time of the procedure.  Preanesthetic Checklist  Completed: patient identified, site marked, surgical consent, pre-op evaluation, timeout performed, IV checked, risks and benefits discussed, monitors and equipment checked and anesthesia consent givenArterial  Skin Prep: chlorhexidine gluconate  Local Infiltration: none  Orientation: left  Location: radial  Catheter Size: 20 G  Catheter placement by Ultrasound guidance. Heme positive aspiration all ports.  Vessel Caliber: medium, patent, compressibility normal  Needle advanced into vessel with real time Ultrasound guidance.  Guidewire confirmed in vessel.  Sterile sheath used.  Assessment  Dressing: secured with tape and tegaderm  Additional Notes  Failed attempt to start art line on right without

## 2019-04-13 PROBLEM — S37.10XA LEFT URETERAL INJURY: Status: ACTIVE | Noted: 2019-04-13

## 2019-04-13 LAB
ANION GAP SERPL CALC-SCNC: 13 MMOL/L (ref 8–16)
BASOPHILS # BLD AUTO: 0.02 K/UL (ref 0–0.2)
BASOPHILS NFR BLD: 0.1 % (ref 0–1.9)
BUN SERPL-MCNC: 27 MG/DL (ref 6–20)
CALCIUM SERPL-MCNC: 9.4 MG/DL (ref 8.7–10.5)
CHLORIDE SERPL-SCNC: 102 MMOL/L (ref 95–110)
CO2 SERPL-SCNC: 25 MMOL/L (ref 23–29)
CREAT SERPL-MCNC: 1.4 MG/DL (ref 0.5–1.4)
DIFFERENTIAL METHOD: ABNORMAL
EOSINOPHIL # BLD AUTO: 0 K/UL (ref 0–0.5)
EOSINOPHIL NFR BLD: 0.1 % (ref 0–8)
ERYTHROCYTE [DISTWIDTH] IN BLOOD BY AUTOMATED COUNT: 13.3 % (ref 11.5–14.5)
EST. GFR  (AFRICAN AMERICAN): 47.8 ML/MIN/1.73 M^2
EST. GFR  (NON AFRICAN AMERICAN): 41.4 ML/MIN/1.73 M^2
GLUCOSE SERPL-MCNC: 209 MG/DL (ref 70–110)
HCT VFR BLD AUTO: 32.3 % (ref 37–48.5)
HGB BLD-MCNC: 10.3 G/DL (ref 12–16)
IMM GRANULOCYTES # BLD AUTO: 0.1 K/UL (ref 0–0.04)
IMM GRANULOCYTES NFR BLD AUTO: 0.7 % (ref 0–0.5)
LYMPHOCYTES # BLD AUTO: 1.5 K/UL (ref 1–4.8)
LYMPHOCYTES NFR BLD: 10.7 % (ref 18–48)
MCH RBC QN AUTO: 26.9 PG (ref 27–31)
MCHC RBC AUTO-ENTMCNC: 31.9 G/DL (ref 32–36)
MCV RBC AUTO: 84 FL (ref 82–98)
MONOCYTES # BLD AUTO: 1.2 K/UL (ref 0.3–1)
MONOCYTES NFR BLD: 8.5 % (ref 4–15)
NEUTROPHILS # BLD AUTO: 10.9 K/UL (ref 1.8–7.7)
NEUTROPHILS NFR BLD: 79.9 % (ref 38–73)
NRBC BLD-RTO: 0 /100 WBC
PLATELET # BLD AUTO: 260 K/UL (ref 150–350)
PMV BLD AUTO: 11.3 FL (ref 9.2–12.9)
POCT GLUCOSE: 146 MG/DL (ref 70–110)
POCT GLUCOSE: 173 MG/DL (ref 70–110)
POCT GLUCOSE: 178 MG/DL (ref 70–110)
POCT GLUCOSE: 205 MG/DL (ref 70–110)
POTASSIUM SERPL-SCNC: 4.5 MMOL/L (ref 3.5–5.1)
RBC # BLD AUTO: 3.83 M/UL (ref 4–5.4)
SODIUM SERPL-SCNC: 140 MMOL/L (ref 136–145)
WBC # BLD AUTO: 13.58 K/UL (ref 3.9–12.7)

## 2019-04-13 PROCEDURE — 85025 COMPLETE CBC W/AUTO DIFF WBC: CPT

## 2019-04-13 PROCEDURE — 63600175 PHARM REV CODE 636 W HCPCS: Performed by: STUDENT IN AN ORGANIZED HEALTH CARE EDUCATION/TRAINING PROGRAM

## 2019-04-13 PROCEDURE — 36415 COLL VENOUS BLD VENIPUNCTURE: CPT

## 2019-04-13 PROCEDURE — 20600001 HC STEP DOWN PRIVATE ROOM

## 2019-04-13 PROCEDURE — 80048 BASIC METABOLIC PNL TOTAL CA: CPT

## 2019-04-13 PROCEDURE — 97166 OT EVAL MOD COMPLEX 45 MIN: CPT

## 2019-04-13 PROCEDURE — 25000003 PHARM REV CODE 250: Performed by: STUDENT IN AN ORGANIZED HEALTH CARE EDUCATION/TRAINING PROGRAM

## 2019-04-13 PROCEDURE — 97162 PT EVAL MOD COMPLEX 30 MIN: CPT

## 2019-04-13 RX ORDER — INSULIN ASPART 100 [IU]/ML
1-10 INJECTION, SOLUTION INTRAVENOUS; SUBCUTANEOUS
Status: DISCONTINUED | OUTPATIENT
Start: 2019-04-13 | End: 2019-04-14 | Stop reason: HOSPADM

## 2019-04-13 RX ORDER — AMOXICILLIN AND CLAVULANATE POTASSIUM 875; 125 MG/1; MG/1
1 TABLET, FILM COATED ORAL EVERY 12 HOURS
Status: DISCONTINUED | OUTPATIENT
Start: 2019-04-13 | End: 2019-04-14 | Stop reason: HOSPADM

## 2019-04-13 RX ORDER — GLUCAGON 1 MG
1 KIT INJECTION
Status: DISCONTINUED | OUTPATIENT
Start: 2019-04-13 | End: 2019-04-14 | Stop reason: HOSPADM

## 2019-04-13 RX ORDER — IBUPROFEN 200 MG
16 TABLET ORAL
Status: DISCONTINUED | OUTPATIENT
Start: 2019-04-13 | End: 2019-04-14 | Stop reason: HOSPADM

## 2019-04-13 RX ORDER — IBUPROFEN 200 MG
24 TABLET ORAL
Status: DISCONTINUED | OUTPATIENT
Start: 2019-04-13 | End: 2019-04-14 | Stop reason: HOSPADM

## 2019-04-13 RX ADMIN — CYCLOBENZAPRINE HYDROCHLORIDE 10 MG: 5 TABLET, FILM COATED ORAL at 04:04

## 2019-04-13 RX ADMIN — INSULIN ASPART 2 UNITS: 100 INJECTION, SOLUTION INTRAVENOUS; SUBCUTANEOUS at 08:04

## 2019-04-13 RX ADMIN — FENTANYL CITRATE 25 MCG: 50 INJECTION INTRAMUSCULAR; INTRAVENOUS at 12:04

## 2019-04-13 RX ADMIN — CYCLOBENZAPRINE HYDROCHLORIDE 10 MG: 5 TABLET, FILM COATED ORAL at 08:04

## 2019-04-13 RX ADMIN — INSULIN ASPART 4 UNITS: 100 INJECTION, SOLUTION INTRAVENOUS; SUBCUTANEOUS at 11:04

## 2019-04-13 RX ADMIN — OXYCODONE HYDROCHLORIDE AND ACETAMINOPHEN 1 TABLET: 5; 325 TABLET ORAL at 11:04

## 2019-04-13 RX ADMIN — HYDROMORPHONE HYDROCHLORIDE 1 MG: 1 INJECTION, SOLUTION INTRAMUSCULAR; INTRAVENOUS; SUBCUTANEOUS at 01:04

## 2019-04-13 RX ADMIN — METOPROLOL SUCCINATE 100 MG: 100 TABLET, EXTENDED RELEASE ORAL at 08:04

## 2019-04-13 RX ADMIN — OXYCODONE HYDROCHLORIDE AND ACETAMINOPHEN 1 TABLET: 5; 325 TABLET ORAL at 08:04

## 2019-04-13 RX ADMIN — HYDROMORPHONE HYDROCHLORIDE 1 MG: 1 INJECTION, SOLUTION INTRAMUSCULAR; INTRAVENOUS; SUBCUTANEOUS at 08:04

## 2019-04-13 RX ADMIN — OXYCODONE HYDROCHLORIDE AND ACETAMINOPHEN 1 TABLET: 5; 325 TABLET ORAL at 03:04

## 2019-04-13 RX ADMIN — OXYCODONE HYDROCHLORIDE AND ACETAMINOPHEN 1 TABLET: 5; 325 TABLET ORAL at 12:04

## 2019-04-13 RX ADMIN — ONDANSETRON 4 MG: 2 INJECTION INTRAMUSCULAR; INTRAVENOUS at 08:04

## 2019-04-13 RX ADMIN — AMLODIPINE BESYLATE 10 MG: 10 TABLET ORAL at 08:04

## 2019-04-13 RX ADMIN — OXYCODONE HYDROCHLORIDE AND ACETAMINOPHEN 1 TABLET: 5; 325 TABLET ORAL at 06:04

## 2019-04-13 RX ADMIN — AMOXICILLIN AND CLAVULANATE POTASSIUM 1 TABLET: 875; 125 TABLET, FILM COATED ORAL at 10:04

## 2019-04-13 RX ADMIN — AMOXICILLIN AND CLAVULANATE POTASSIUM 1 TABLET: 875; 125 TABLET, FILM COATED ORAL at 08:04

## 2019-04-13 NOTE — SUBJECTIVE & OBJECTIVE
Interval History: 4/13: POD 1. JADA ON, AF VSS. H+H stable, MADHURI with 2.5mL output. Has correa in place per urology recs. Has been drinking only since surgery without n/v and has not yet been out of bed.     Medications:  Continuous Infusions:   sodium chloride 0.9%       Scheduled Meds:   amLODIPine  10 mg Oral Daily    cyclobenzaprine  10 mg Oral TID    metoprolol succinate  100 mg Oral Daily    polyethylene glycol  17 g Oral Daily     PRN Meds:albuterol, dextrose 50%, dextrose 50%, dextrose 50%, glucagon (human recombinant), glucose, glucose, glucose, glucose, hydrALAZINE, HYDROmorphone, insulin aspart U-100, labetalol, ondansetron, oxyCODONE-acetaminophen, senna-docusate 8.6-50 mg     Review of Systems  Objective:     Weight: 75 kg (165 lb 5.5 oz)  Body mass index is 28.38 kg/m².  Vital Signs (Most Recent):  Temp: 97.5 °F (36.4 °C) (04/13/19 0354)  Pulse: 97 (04/13/19 0354)  Resp: 18 (04/13/19 0354)  BP: 123/70 (04/13/19 0354)  SpO2: (!) 94 % (04/13/19 0354) Vital Signs (24h Range):  Temp:  [97.5 °F (36.4 °C)-98.3 °F (36.8 °C)] 97.5 °F (36.4 °C)  Pulse:  [88-98] 97  Resp:  [16-20] 18  SpO2:  [94 %-100 %] 94 %  BP: (103-156)/(55-76) 123/70                          Closed/Suction Drain 04/12/19 2115 Left Other (Comment) Bulb 10 Fr. (Active)   Site Description Unable to view 4/13/2019  1:16 AM   Dressing Type Gauze 4/13/2019  1:16 AM   Dressing Status Dry;Clean;Intact 4/13/2019  1:16 AM   Drainage Bloody 4/13/2019  1:16 AM   Status To bulb suction 4/13/2019  1:16 AM   Output (mL) 2.5 mL 4/13/2019  4:00 AM            Urethral Catheter 04/12/19 1650 Non-latex;Straight-tip (Active)   Site Assessment Clean;Intact 4/13/2019  1:16 AM   Collection Container Urimeter 4/13/2019  1:16 AM   Securement Method secured to top of thigh w/ tape 4/13/2019  1:16 AM   Catheter Care Performed yes 4/13/2019  1:16 AM   Reason for Continuing Urinary Catheterization Post operative 4/13/2019  1:16 AM   CAUTI Prevention Bundle Intact seal  between catheter & drainage tubing 4/13/2019  1:16 AM   Output (mL) 720 mL 4/13/2019  4:00 AM       Neurosurgery Physical Exam  GCS 15  5/5 strength bilaterally in LE's  Reflexes 3+  No numbness or tingling at present  MADHURI drain and correa in place  Abdomen soft, NT.       Significant Labs:  Recent Labs   Lab 04/13/19  0543   *      K 4.5      CO2 25   BUN 27*   CREATININE 1.4   CALCIUM 9.4     Recent Labs   Lab 04/13/19  0543   WBC 13.58*   HGB 10.3*   HCT 32.3*        No results for input(s): LABPT, INR, APTT in the last 48 hours.  Microbiology Results (last 7 days)     ** No results found for the last 168 hours. **        Recent Lab Results       04/13/19  0543   04/12/19  2314   04/12/19  1129        Anion Gap 13         Baso # 0.02         Basophil% 0.1         BUN, Bld 27         Calcium 9.4         Chloride 102         CO2 25         Creatinine 1.4         Differential Method Automated         eGFR if  47.8         eGFR if non  41.4  Comment:  Calculation used to obtain the estimated glomerular filtration  rate (eGFR) is the CKD-EPI equation.            Eos # 0.0         Eosinophil% 0.1         Glucose 209         Gran # (ANC) 10.9         Gran% 79.9         Hematocrit 32.3         Hemoglobin 10.3         Immature Grans (Abs) 0.10  Comment:  Mild elevation in immature granulocytes is non specific and   can be seen in a variety of conditions including stress response,   acute inflammation, trauma and pregnancy. Correlation with other   laboratory and clinical findings is essential.           Immature Granulocytes 0.7         Lymph # 1.5         Lymph% 10.7         MCH 26.9         MCHC 31.9         MCV 84         Mono # 1.2         Mono% 8.5         MPV 11.3         nRBC 0         Platelets 260         POCT Glucose   219 164     Potassium 4.5         RBC 3.83         RDW 13.3         Sodium 140         WBC 13.58               Significant Diagnostics:  CT:  No results found in the last 24 hours.  Echoencephalography: No results found in the last 24 hours.  MRI: No results found in the last 24 hours.

## 2019-04-13 NOTE — HPI
Ms. Mariann Huff is a 60 yo female with a longstanding history of low back pain in the L5-S1 region.  Her pain shoots down her R leg in an L5 distribution and is severe sometimes reaching 10/10. She has tried PT and PAULO to no relief.  Taken for elective L5/S1 ALIF.

## 2019-04-13 NOTE — ANESTHESIA POSTPROCEDURE EVALUATION
Anesthesia Post Evaluation    Patient: Mariann Huff    Procedure(s) Performed: Procedure(s) (LRB):  FUSION, SPINE, LUMBAR, ANTERIOR APPROACH Left L5-S1 Anterior to Psoa Interbody Fusion, L5-S1 Posterior Instrumentation (Left)  STENT, URETERAL placement  REPAIR, URETER    Final Anesthesia Type: general  Patient location during evaluation: PACU  Patient participation: Yes- Able to Participate  Level of consciousness: awake and alert  Post-procedure vital signs: reviewed and stable  Pain management: adequate  Airway patency: patent  PONV status at discharge: No PONV  Anesthetic complications: no      Cardiovascular status: blood pressure returned to baseline  Respiratory status: unassisted  Hydration status: euvolemic  Follow-up not needed.          Vitals Value Taken Time   /70 4/13/2019  3:54 AM   Temp 36.4 °C (97.5 °F) 4/13/2019  3:54 AM   Pulse 97 4/13/2019  3:54 AM   Resp 18 4/13/2019  3:54 AM   SpO2 94 % 4/13/2019  3:54 AM         Event Time     Out of Recovery 04/13/2019 01:00:00          Pain/Derick Score: Pain Rating Prior to Med Admin: 7 (4/13/2019  3:52 AM)

## 2019-04-13 NOTE — SUBJECTIVE & OBJECTIVE
Interval History:   No acute events overnight  Pain controlled well this am  Ambulating   Tolerating regular diet    Review of Systems  Objective:     Temp:  [97.5 °F (36.4 °C)-98.3 °F (36.8 °C)] 97.5 °F (36.4 °C)  Pulse:  [] 101  Resp:  [16-20] 16  SpO2:  [94 %-100 %] 95 %  BP: (103-156)/(55-76) 133/67     Body mass index is 28.38 kg/m².           Drains     Drain                 Closed/Suction Drain 04/12/19 2115 Left Other (Comment) Bulb 10 Fr. less than 1 day         Urethral Catheter 04/12/19 1650 Non-latex;Straight-tip less than 1 day                Physical Exam   Constitutional: She is oriented to person, place, and time. She appears well-developed and well-nourished. No distress.   HENT:   Head: Normocephalic and atraumatic.   Eyes: No scleral icterus.   Neck: No JVD present.   Cardiovascular: Normal rate and regular rhythm.    Pulmonary/Chest: Effort normal. No respiratory distress.   Abdominal: Soft. She exhibits no distension. There is no tenderness. There is no rebound and no guarding.   MADHURI drain with no output   Genitourinary:   Genitourinary Comments: Fontenot draining light pink   Neurological: She is alert and oriented to person, place, and time.   Skin: She is not diaphoretic. No pallor.     Psychiatric: She has a normal mood and affect. Her behavior is normal.       Significant Labs:    BMP:  Recent Labs   Lab 04/13/19  0543      K 4.5      CO2 25   BUN 27*   CREATININE 1.4   CALCIUM 9.4       CBC:   Recent Labs   Lab 04/13/19  0543   WBC 13.58*   HGB 10.3*   HCT 32.3*          All pertinent labs results from the past 24 hours have been reviewed.    Significant Imaging:  KUB: stent in good position

## 2019-04-13 NOTE — HPI
Mariann Huff is a 59 y.o. female s/p left ureteral injury during anterior approach spinal fusion of L5/S1 on 4/12/19

## 2019-04-13 NOTE — PT/OT/SLP EVAL
Occupational Therapy   Evaluation    Name: Mariann Huff  MRN: [id number]  Admitting Diagnosis:  1 Day Post-Op   FUSION, SPINE, LUMBAR, ANTERIOR APPROACH Left L5-S1 Anterior to Psoa Interbody Fusion, L5-S1 Posterior Instrumentation (Left)  STENT, URETERAL placement  REPAIR, URETER   Recommendations:     Discharge Recommendations: outpatient PT  Discharge Equipment Recommendations:  shower chair, walker, rolling  Barriers to discharge:  None    Assessment:     Mariann Huff is a 59 y.o. female whom is 1 day post op:FUSION, SPINE, LUMBAR, ANTERIOR APPROACH Left L5-S1 Anterior to Psoa Interbody Fusion, L5-S1 Posterior Instrumentation (Left)  STENT, URETERAL placement with REPAIR, URETER.  She presents with R LE weakness and impaired sensation. Moreover, she demo correa for ~1 week duration post op. Pt tearful with outcomes at this time; however, demo good motivation within therapy session. Pt/spouse edu on spinal precautions and ergonomics with need for reinforcement. Due to R LE weakness, pt with impaired ADLs/self care at her PLOF at this time. She would greatly benefit from nursing mobility protocol for endurance mgmt while in acute setting-- discussed mobility status with PCT following session. Performance deficits affecting function: weakness, decreased lower extremity function, impaired self care skills, impaired functional mobilty, gait instability, impaired balance, other (comment), pain.      Rehab Prognosis: Good; patient would benefit from acute skilled OT services to address these deficits and reach maximum level of function.       Plan:     Patient to be seen 3 x/week to address the above listed problems via self-care/home management, therapeutic activities, neuromuscular re-education  · Plan of Care Expires: 05/12/19  · Plan of Care Reviewed with: patient, spouse    Subjective     Chief Complaint: tearful with R LE sensory and motor deficits   Patient/Family Comments/goals: to get better    Occupational  Profile:  Living Environment: Pt lives with spouse in [facility] with threshold to enter. Walk in shower with threshold to enter.   Previous level of function: indep with ADLs/self care/mobility. Owns and using no DME/requiring no assist. Wears glasses.   Roles and Routines: wife, care taker to self, , home and community dweller  Equipment Used at Home:  none  Assistance upon Discharge: yes, spouse able to take off and assist as needed upon initial d/c    Pain/Comfort:  · Pain Rating 1: 0/10  · Pain Rating Post-Intervention 1: 0/10    Patients cultural, spiritual, Rastafarian conflicts given the current situation:  none reported    Objective:     Communicated with: RN prior to session. Spouse present throughout. Completed with PT.  Patient found HOB elevated with correa catheter, peripheral IV, MADHURI drain upon OT entry to room.    General Precautions: Standard, fall, diabetic   Orthopedic Precautions:spinal precautions   Braces: TLSO     Occupational Performance:  Bed Mobility:  HOB flat  · Patient completed Rolling/Turning to Right with contact guard assistance and with side rail  · Patient completed Supine to Sit with minimum assistance    Functional Mobility/Transfers:  · Patient completed Sit <> Stand Transfer with minimum assistance  with  rolling walker   · x2 trials from EOB  · Patient completed Bed <> Chair Transfer using Step Transfer technique with minimum assistance with rolling walker  · Functional Mobility: household distance with rolling walker and minimal assistance  · Assistance for weight shift over R LE    Activities of Daily Living:  · Feeding:  modified independence with setup  · Grooming: declined completing in standing; completed prior to arrival    · Upper Body Dressing: modified independence EOB to don gown as jacket  · Lower Body Dressing: contact guard assistance EOB for 4 point position for B socks  · Toileting: [place]      Cognitive/Visual Perceptual:  Cognitive/Psychosocial Skills:     -        Oriented to: Person, Place, Time and Situation   -       Follows Commands/attention:Follows multistep  commands  -       Safety awareness/insight to disability: intact and tearful   -       Mood/Affect/Coping skills/emotional control: Cooperative  Visual/Perceptual:      -Intact ; glasses donned for session    Physical Exam:  Balance:    -       Impaired   Postural examination/scapula alignment:    -       Rounded shoulders  -       Forward head  Skin integrity: Visible skin intact  Sensation:    -       Intact  B UE  Motor Planning:    -       Intact B UE  Dominant hand:    -       R  Upper Extremity Range of Motion:     -       Right Upper Extremity: WFL  -       Left Upper Extremity: WFL  Upper Extremity Strength:    -       Right Upper Extremity: WFL  -       Left Upper Extremity: WFL   Strength:    -       Right Upper Extremity: WFL  -       Left Upper Extremity: WFL  Fine Motor Coordination:    -       Intact  Left hand, manipulation of objects and Right hand, manipulation of objects  Gross motor coordination:   WFL B UE    AMPAC 6 Click ADL:  AMPAC Total Score: 20   Body mass index is 28.38 kg/m².  Vitals:    04/13/19 0819   BP: 133/67   Pulse: 101   Resp: 16   Temp: 97.5 °F (36.4 °C)       Treatment & Education:  -Pt alert and agreeable to therapy eval; edu on OT role in care  -edu on log rolling technique and spinal precautions using BLT method  -Edu on LB dressing modifications for spinal precautions and ergonomic safety  -edu on wear schedule for TLSO; donned at EOB with set up and mod(A)   -discussed use of call light for staff assistance for OOB activity; edu on importance of mobility for healing process  -Discussed mobility status with PCT following session   -Communication board updated; questions/concerns addressed within OT scope of practice    Education:    Patient left up in chair with all lines intact, call button in reach and neuro MD and urology MD present    GOALS:   Multidisciplinary  Problems     Occupational Therapy Goals     Not on file          Multidisciplinary Problems (Resolved)        Problem: Occupational Therapy Goal    Goal Priority Disciplines Outcome Interventions   Occupational Therapy Goal   (Resolved)     OT, PT/OT Outcome(s) achieved    Description:  Goals to be met by:      Patient will increase functional independence with ADLs by performing:    Donning TLSO while seated EOB with Set-up Assistance and Supervision.  LE Dressing (pants, brief) with Set-up Assistance and Contact Guard Assistance.  Grooming while standing at sink with Minimal Assistance.  Toileting from toilet with Contact Guard Assistance for hygiene and clothing management.   Toilet transfer to toilet with Contact Guard Assistance.  Functional mobility at short household distance for ADL task with RW and CGA.  Pt/CG verbalized understanding for 3/3 spinal precautions.                       History:     Past Medical History:   Diagnosis Date    Anticoagulant long-term use     Asthma     Back pain     CAD (coronary artery disease)     s/p stentimg  (2), (1)    Carotid artery stenosis     Diabetes mellitus type 2 in obese     HTN (hypertension), benign     Hyperlipidemia     Keloid cicatrix     NPDR (nonproliferative diabetic retinopathy) 2015    NSTEMI (non-ST elevated myocardial infarction)     Nuclear sclerosis - Right Eye 3/18/2014    Primary localized osteoarthrosis, lower leg 2014    Sleep apnea        Past Surgical History:   Procedure Laterality Date    CARDIAC CATHETERIZATION      cataract extraction left eye      cataracts      CATHETERIZATION, HEART, LEFT Left 2014    [facility]     SECTION, LOW TRANSVERSE      CORONARY ANGIOPLASTY      ESOPHAGOGASTRODUODENOSCOPY (EGD) N/A 2016    Performed by Gardenia Adamson [facility] at [facility] ENDO (4TH FLR)    EXCISION TURBINATE, SUBMUCOUS      HAND SURGERY Left     HAND  SURGERY Right     torn ligament repair/ Dr. Yeboah    HYSTERECTOMY      Injection,steroid,epidural,transforaminal approach - Bilateral - S1 Bilateral 9/25/2018    Performed by Tl [facility]breu [facility] at [facility] PAIN MGT    Injection-steroid-epidural-caudal N/A 2/7/2019    Performed by Dave Bentley Jr., MD at [facility] PAIN MGT    INSERTION-INTRAOCULAR LENS (IOL) Right 9/1/2015    Performed by [facility]ingo [facility] at [facility] 1ST FLR    left foot surgery      left wrist surgery      NASAL SEPTUM SURGERY  5/7/15    PHACOEMULSIFICATION-ASPIRATION-CATARACT Right 9/1/2015    Performed by [facility] at [facility] OR 1ST FLR    RESECTION-TURBINATES (SMR) N/A 5/7/2015    Performed by Dileep Dubois III, [facility] at [facility] OR 2ND [facility]    rt elbow surgery      S/P LAD COATED STENT  05/14/2010    6 total     S/P OM1 STENT  08/2003    SEPTOPLASTY N/A 5/7/2015    Performed [facility]ileep [facility] at [facility]    SINUS SURGERY      F.E.S.S.    SINUS SURGERY FUNCTIONAL ENDOSCOPIC WITH NAVIGATION WITH MAXILLARIES, ETHMOIDS, LEFT SPHENOID, LEFT LOLY N/A 5/7/2015    Performed by [facility]ileep [facility] at [facility]    TUBAL LIGATION         Time Tracking:     OT Date of Treatment: 04/13/19  OT Start Time: 0731  OT Stop Time: 0754  OT Total Time (min): 23 min    Billable Minutes:Evaluation 20    PRIMITIVO Corley  4/13/2019

## 2019-04-13 NOTE — PT/OT/SLP EVAL
Physical Therapy Evaluation    Patient Name:  Mariann Huff   MRN:  [id number]    Recommendations:     Discharge Recommendations:  outpatient PT   Discharge Equipment Recommendations: walker, rolling, shower chair   Barriers to discharge: None    Assessment:     Mariann Huff is a 59 y.o. female admitted with a medical diagnosis of lumbosacral radiculopathy, 1 day status pose L5-S1 fusion with ureteral stent placement.  She presents with the following impairments/functional limitations:  weakness, impaired endurance, impaired sensation, impaired functional mobilty, gait instability, impaired balance, decreased lower extremity function, decreased safety awareness, orthopedic precautions. The patient presents with new onset R lower extremity weakness with foot drop following surgery as well as impaired sensation. She is safe to ambulate with RN assist using RW, she would benefit from RN mobility protocol.     Rehab Prognosis: Good; patient would benefit from acute skilled PT services to address these deficits and reach maximum level of function.    Recent Surgery: Procedure(s) (LRB):  FUSION, SPINE, LUMBAR, ANTERIOR APPROACH Left L5-S1 Anterior to Psoa Interbody Fusion, L5-S1 Posterior Instrumentation (Left)  STENT, URETERAL placement  REPAIR, URETER 1 Day Post-Op    Plan:     During this hospitalization, patient to be seen 3 x/week to address the identified rehab impairments via gait training, therapeutic activities, therapeutic exercises, neuromuscular re-education and progress toward the following goals:    · Plan of Care Expires:  05/13/19    Subjective     Chief Complaint: patient tearful during session due to new onset of R leg weakness, needing RW for mobility  Patient/Family Comments/goals: ambulate with therapy, return home  Pain/Comfort:  · Pain Rating 1: 0/10    Patients cultural, spiritual, Caodaism conflicts given the current situation: no    Living Environment:  She lives with her  in a [facility],  "no steps to enter. She was driving, not working PTA.  Prior to admission, patients level of function was independent.  Equipment used at home: none.  DME owned (not currently used): none.  Upon discharge, patient will have assistance from  available to assist.    Objective:     Communicated with RN prior to session.  Patient found supine with peripheral IV, correa catheter, MADHURI drain  upon PT entry to room.    General Precautions: Standard, fall, diabetic   Orthopedic Precautions:spinal precautions   Braces: TLSO     Exams:    Cognitive Exam  Patient is A&O x4 and follows 100% of one -step commands    Gross Motor Coordination   -       Impaired gross coordination of R leg with gait, uneven stepping     Postural Exam Patient presented with the following abnormalities:    -       Rounded shoulders  -       Forward head   Sensation    -       Light touch reports equal sensation bilateral lower extremities, states "it feels like my R leg is asleep", impaired proprioception with weight bearing   Skin Integrity/Edema     -       Skin integrity: visibly intact  -       Edema: NA   R LE ROM WFL   R LE Strength 4-/5 hip flexion, knee ext/flex, and 3/5 ankle DF/PF   L LE ROM WFL   L LE Strength  5/5 hip flexion, knee ext/flex, and ankle DF/PF     Balance          Static Sitting stand by assistance   Dynamic Sitting stand by assistance   Static Standing contact guard assist   Dynamic Standing minimum assistance              Functional Mobility:    Bed Mobility  Rolling to R: contact guard assist  Supine to Sit:  minimum assistance   Transfers Sit to Stand:  minimum assistance with RW   Gait  Gait Distance: 30 ft with RW  Assistance Level: initially minimum, progressed to contact guard assist  Description: steppage gait on R with R foot drop, decreased weight bearing on R in stance, R knee hyperextension moment in stance phase, step to gait with R leg leading.          Therapeutic Activities and Exercises:   Patient and " spouse educated on role of therapy, goals of session, benefits of out of bed mobility. Patient agreeable to mobilize with therapy.  Discussed PT plan of care during hospitalization. Patient educated that they need to call for assistance to mobilize out of bed. Whiteboard updated as appropriate. Patient educated on how their diagnosis impacts their mobility within PT scope of practice. Donned TLSO in sitting edge of bed. Reviewed spinal precautions, log roll, use of TLSO, continue to reinforce. Gait training on use of RW, sequencing with RW progression, cues for advancing R leg with step to pattern, progressed to more reciprocal stepping.     AM-PAC 6 CLICK MOBILITY  Total Score:17     Patient left up in chair with all lines intact, call button in reach and  and urologist present.    GOALS:   Multidisciplinary Problems     Physical Therapy Goals        Problem: Physical Therapy Goal    Goal Priority Disciplines Outcome Goal Variances Interventions   Physical Therapy Goal     PT, PT/OT Ongoing (interventions implemented as appropriate)     Description:  Goals to be met by: 2019     Patient will increase functional independence with mobility by performin. Supine to sit with Set-up [place]  2. Sit to supine with Set-up [place]  3. Sit to stand transfer with Stand-by Assistance  4. Gait  x 100 feet with Stand-by Assistance using Rolling Walker.   5. Stand for 10 minutes while performing dynamic standing balance activities without AD without loss of balance to reduce risk of falls.   20. Lower extremity exercise program x20 reps per handout, with independence.                    History:     Past Medical History:   Diagnosis Date    Anticoagulant long-term use     Asthma     Back pain     CAD (coronary artery disease)     s/p stentimg  (2), (1)    Carotid artery stenosis     Diabetes mellitus type 2 in obese     HTN (hypertension), benign     Hyperlipidemia     Keloid cicatrix      NPDR (nonproliferative diabetic retinopathy) 2015    NSTEMI (non-ST elevated myocardial infarction)     Nuclear sclerosis - Right Eye 3/18/2014    Primary localized osteoarthrosis, lower leg 2014    Sleep apnea        Past Surgical History:   Procedure Laterality Date    CARDIAC CATHETERIZATION      cataract extraction left eye      cataracts      CATHETERIZATION, HEART, LEFT Left 2014    [facility] CATH LAB     SECTION, LOW TRANSVERSE      CORONARY ANGIOPLASTY      ESOPHAGOGASTRODUODENOSCOPY (EGD) N/A 2016    Performed by Gardenia Adamson MD at [facility] ENDO (4TH FLR)    EXCISION TURBINATE, SUBMUCOUS      HAND SURGERY Left     HAND SURGERY Right     torn ligament repair/ Dr. Yeboah    HYSTERECTOMY      Injection,steroid,epidural,transforaminal approach - Bilateral - S1 Bilateral 2018    [facility] PAIN MGT    Injection-steroid-epidural-caudal N/A 2019    Per[facility] by Dave [facility]ley Jr., [facility] at [facility] PAIN MGT    INSERTION-INTRAOCULAR LENS (IOL) Right 2015    Performed by Good Domingo [facility] at [facility] OR 1ST FLR    left foot surgery      left wrist surgery      NASAL SEPTUM SURGERY  5/7/15    PHACOEMULSIFICATION-ASPIRATION-CATARACT Right 2015    Performed [facility] Good Domingo [facility] at [facility] OR 1ST FLR    RESECTION-TURBINATES (SMR) N/A 2015    Performed by Dileep Dub[facility] III, MD at [facility] OR 2ND FLR    rt elbow surgery      S/P LAD COATED STENT  2010    6 total     S/P OM1 STENT  2003    SEPTOPLASTY N/A 2015    Performed by Dileep [facility] III, MD at [facility] OR 2ND FLR    SINUS SURGERY      F.E.S.S.    SINUS SURGERY FUNCTIONAL ENDOSCOPIC WITH NAVIGATION WITH MAXILLARIES, ETHMOIDS, LEFT SPHENOID, LEFT LOLY N/A 2015    Performed by Dileep [facility]ois III, MD at [facility] OR 2ND FLR    TUBAL LIGATION         Time Tracking:     PT Received On: 19  PT Start  Time: 0731     PT Stop Time: 0754  PT Total Time (min): 23 min     Billable Minutes: Evaluation 23 (co-eval with OT)      Sarita Valdez, PT  04/13/2019

## 2019-04-13 NOTE — PROGRESS NOTES
[facility]  Neurosurgery  Progress Note    Subjective:     History of Present Illness: Ms. Mariann Huff is a 60 yo female with a longstanding history of low back pain in the L5-S1 region.  Her pain shoots down her R leg in an L5 distribution and is severe sometimes reaching 10/10. She has tried PT and PAULO to no relief.  Taken for elective L5/S1 ALIF.     Post-Op Info:  Procedure(s) (LRB):  FUSION, SPINE, LUMBAR, ANTERIOR APPROACH Left L5-S1 Anterior to Psoa Interbody Fusion, L5-S1 Posterior Instrumentation (Left)  STENT, URETERAL placement  REPAIR, URETER   1 Day Post-Op     Interval History: 4/13: POD 1. JADA ON, AF VSS. H+H stable, MADHURI with 2.5mL output. Has correa in place per urology recs. Has been drinking only since surgery without n/v and has not yet been out of bed.     Medications:  Continuous Infusions:   sodium chloride 0.9%       Scheduled Meds:   amLODIPine  10 mg Oral Daily    cyclobenzaprine  10 mg Oral TID    metoprolol succinate  100 mg Oral Daily    polyethylene glycol  17 g Oral Daily     PRN Meds:albuterol, dextrose 50%, dextrose 50%, dextrose 50%, glucagon (human recombinant), glucose, glucose, glucose, glucose, hydrALAZINE, HYDROmorphone, insulin aspart U-100, labetalol, ondansetron, oxyCODONE-acetaminophen, senna-docusate 8.6-50 mg     Review of Systems  Objective:     Weight: 75 kg (165 lb 5.5 oz)  Body mass index is 28.38 kg/m².  Vital Signs (Most Recent):  Temp: 97.5 °F (36.4 °C) (04/13/19 0354)  Pulse: 97 (04/13/19 0354)  Resp: 18 (04/13/19 0354)  BP: 123/70 (04/13/19 0354)  SpO2: (!) 94 % (04/13/19 0354) Vital Signs (24h Range):  Temp:  [97.5 °F (36.4 °C)-98.3 °F (36.8 °C)] 97.5 °F (36.4 °C)  Pulse:  [88-98] 97  Resp:  [16-20] 18  SpO2:  [94 %-100 %] 94 %  BP: (103-156)/(55-76) 123/70                          Closed/Suction Drain 04/12/19 2115 Left Other (Comment) Bulb 10 Fr. (Active)   Site Description Unable to view 4/13/2019  1:16 AM   Dressing Type Gauze  4/13/2019  1:16 AM   Dressing Status Dry;Clean;Intact 4/13/2019  1:16 AM   Drainage Bloody 4/13/2019  1:16 AM   Status To bulb suction 4/13/2019  1:16 AM   Output (mL) 2.5 mL 4/13/2019  4:00 AM            Urethral Catheter 04/12/19 1650 Non-latex;Straight-tip (Active)   Site Assessment Clean;Intact 4/13/2019  1:16 AM   Collection Container Urimeter 4/13/2019  1:16 AM   Securement Method secured to top of thigh w/ tape 4/13/2019  1:16 AM   Catheter Care Performed yes 4/13/2019  1:16 AM   Reason for Continuing Urinary Catheterization Post operative 4/13/2019  1:16 AM   CAUTI Prevention Bundle Intact seal between catheter & drainage tubing 4/13/2019  1:16 AM   Output (mL) 720 mL 4/13/2019  4:00 AM       Neurosurgery Physical Exam  GCS 15  5/5 strength bilaterally in LE's  Reflexes 3+  No numbness or tingling at present  MADHURI drain and correa in place  Abdomen soft, NT.       Significant Labs:  Recent Labs   Lab 04/13/19  0543   *      K 4.5      CO2 25   BUN 27*   CREATININE 1.4   CALCIUM 9.4     Recent Labs   Lab 04/13/19  0543   WBC 13.58*   HGB 10.3*   HCT 32.3*        No results for input(s): LABPT, INR, APTT in the last 48 hours.  Microbiology Results (last 7 days)     ** No results found for the last 168 hours. **        Recent Lab Results       04/13/19  0543   04/12/19  2314   04/12/19  1129        Anion Gap 13         Baso # 0.02         Basophil% 0.1         BUN, Bld 27         Calcium 9.4         Chloride 102         CO2 25         Creatinine 1.4         Differential Method Automated         eGFR if  47.8         eGFR if non  41.4  Comment:  Calculation used to obtain the estimated glomerular filtration  rate (eGFR) is the CKD-EPI equation.            Eos # 0.0         Eosinophil% 0.1         Glucose 209         Gran # (ANC) 10.9         Gran% 79.9         Hematocrit 32.3         Hemoglobin 10.3         Immature Grans (Abs) 0.10  Comment:  Mild elevation  in immature granulocytes is non specific and   can be seen in a variety of conditions including stress response,   acute inflammation, trauma and pregnancy. Correlation with other   laboratory and clinical findings is essential.           Immature Granulocytes 0.7         Lymph # 1.5         Lymph% 10.7         MCH 26.9         MCHC 31.9         MCV 84         Mono # 1.2         Mono% 8.5         MPV 11.3         nRBC 0         Platelets 260         POCT Glucose   219 164     Potassium 4.5         RBC 3.83         RDW 13.3         Sodium 140         WBC 13.58               Significant Diagnostics:  CT: No results found in the last 24 hours.  Echoencephalography: No results found in the last 24 hours.  MRI: No results found in the last 24 hours.    Assessment/Plan:     Lumbar radiculopathy  Patient with history of R L5 radiculopathy and severe back pain with neurogenic claudication. Now s/p elective ALIF (4/13) with ureteral injury intraop and primary repair.      --POD 1; admitted to floor on telemetry  --Pending L Spine x rays  --Brace when out of bed (at bedside)  --PT/OT/OOB today  --H+H stable post operatively  --Per urology, maintain correa catheter at all times, do not remove  --MADHURI drain at surgical site with minimal output  --Continue ancef 2g q8 hours  --Restart home medications: with SSI  --Acute pain control with IV for breakthrough pain  --ADAT: diabetic diet            Jasbir Santiago MD  Neurosurgery  [facility]

## 2019-04-13 NOTE — OP NOTE
[facility]  Operative Note    Date: 04/12/2019    Pre-Op Diagnosis:    1. Intraop left ureteral injury    Patient Active Problem List   Diagnosis    Hypertension associated with diabetes    Hyperlipidemia    CAD (coronary artery disease)    Sleep apnea    Carotid stenosis, bilateral    Non compliance with medical treatment    Coronary stent restenosis    S/P coronary artery stent placement    Nuclear sclerosis - Right Eye    Primary localized osteoarthrosis, lower leg    Chronic sinusitis    PSC (posterior subcapsular cataract), right    DME (diabetic macular edema)    Refractive error    Pseudophakia    Senile nuclear sclerosis    Type 2 diabetes mellitus with diabetic peripheral angiopathy without gangrene    Diabetic peripheral neuropathy associated with type 2 diabetes mellitus    Cervical arthritis    Neurogenic claudication    Lumbar herniated disc    Fatty liver disease, nonalcoholic    Arthritis of lumbar spine    Chronic pain    Lumbar radiculopathy    Lumbosacral radiculopathy at L5       Post-Op Diagnosis: same    Procedure(s) Performed:   1.  Left ureteroureterostomy   2.  Left ureteral stent placement    Specimen(s): none    Staff Surgeon: Robin Boyd MD    Assistant Surgeon: Yulissa Salas MD; Hima Duncan MD    Anesthesia:  General endotracheal anesthesia    Indications: Mariann Huff is a 59 y.o. female undergoing left anterior L5-S1 interbody fusion who sustained a left ureteral transection.    Findings:    1.  Left ureter found to be completely transected  2.  Ends of ureter appeared healthy, no electrocautery was used  3.  End to end ureteroureterostomy performed with stent placement     Estimated Blood Loss: 2 mL     Drains:   1.  6 Fr x 24 cm left JJ ureteral stent   2.  16 Fr correa catheter  3.  10 Fr MADHURI drain    Procedure in detail: Called into the room by neurosurgery due to suspicion for ureteral injury.     On inspection left ureter was  found to be completely transected. Ends appeared healthy as no electrocautery was used. Clear urine seen coming from proximal end. Due to exposure, access to the ureter was difficult to obtain.      The proximal end of the ureter was cannulated with a glide wire and then a 5 Fr open ended catheter. The distal end was cannulated with the glide wire. Both of these were left in place to better identify the lumen. Both ends of the ureter were spatulated, laterally on the distal end and medial on the proximal end. 2 stay sutures were placed using 5-0 monocryl through both ends. These were secured with hemostats.     We then cannulated the proximal ureter with the glide wire and a 6 fr x 26 cm ureteral stent was passed over the wire into the kidney. The wire was removed. The wire was then placed through a side hole out the distal end of the stent. This was then fed through the distal ureter to the bladder. The stent was then advanced over the wire.     The 2 stay sutures were then tied down to re-approximate the ureter. We then placed 3 more sutures using 5-0 monocryl where leakage was seen. There was no significant leakage at the conclusion.      The case was then turned over to the primary team. A 10 Fr MADHURI drain was left in place.     Disposition:  The patient will be admitted to the neurosurgery team. A KUB will be obtained in PACU.       Yulissa Salas MD

## 2019-04-13 NOTE — PROGRESS NOTES
[facility]  Urology  Progress Note    Patient Name: Mariann Huff  MRN: [id number]  Admission Date: 4/12/2019  Hospital Length of Stay: 1 days  Code Status: Full Code   Attending Provider: Robin Boyd MD  Primary Care Physician: Jasbir Haney MD    Subjective:     HPI:  Mariann Huff is a 59 y.o. female s/p left ureteral injury during anterior approach spinal fusion of L5/S1 on 4/12/19    Interval History:   No acute events overnight  Pain controlled well this am  Ambulating   Tolerating regular diet    Review of Systems  Objective:     Temp:  [97.5 °F (36.4 °C)-98.3 °F (36.8 °C)] 97.5 °F (36.4 °C)  Pulse:  [] 101  Resp:  [16-20] 16  SpO2:  [94 %-100 %] 95 %  BP: (103-156)/(55-76) 133/67     Body mass index is 28.38 kg/m².           Drains     Drain                 Closed/Suction Drain 04/12/19 2115 Left Other (Comment) Bulb 10 Fr. less than 1 day         Urethral Catheter 04/12/19 1650 Non-latex;Straight-tip less than 1 day                Physical Exam   Constitutional: She is oriented to person, place, and time. She appears well-developed and well-nourished. No distress.   HENT:   Head: Normocephalic and atraumatic.   Eyes: No scleral icterus.   Neck: No JVD present.   Cardiovascular: Normal rate and regular rhythm.    Pulmonary/Chest: Effort normal. No respiratory distress.   Abdominal: Soft. She exhibits no distension. There is no tenderness. There is no rebound and no guarding.   MADHURI drain with no output   Genitourinary:   Genitourinary Comments: Fontenot draining light pink   Neurological: She is alert and oriented to person, place, and time.   Skin: She is not diaphoretic. No pallor.     Psychiatric: She has a normal mood and affect. Her behavior is normal.       Significant Labs:    BMP:  Recent Labs   Lab 04/13/19  0543      K 4.5      CO2 25   BUN 27*   CREATININE 1.4   CALCIUM 9.4       CBC:   Recent Labs   Lab 04/13/19  0543   WBC 13.58*   HGB 10.3*   HCT 32.3*           All pertinent labs results from the past 24 hours have been reviewed.    Significant Imaging:  KUB: stent in good position      Assessment/Plan:     Left ureteral injury  Mariann Huff is a 59 y.o. female with intra op left ureteral injury    - Will start patient on Augmentin for 1 week course, has had pan sensitive E coli UTIs in the past  - Maintain correa, will see in clinic on Tuesday for removal  - Maintain MADHURI, will go home and record outputs q8 hrs, if output remains low after correa removal will consider removal on Thursday  - Hematuria expected following procedure  - Stent appears in good position on KUB, will keep in place for at least 2 months to allow healing        VTE Risk Mitigation (From admission, onward)        Ordered     Place CRISTINA hose  Until discontinued      04/12/19 1052     Place sequential compression device  Until discontinued      04/12/19 1052          Yulissa Salas MD  Urology  [facility]

## 2019-04-13 NOTE — NURSING TRANSFER
Nursing Transfer Note      4/13/2019     Transfer To: 702    Transfer via stretcher    Transfer with O2 2L NC, TLSO brace    Transported by transport and PCT    Medicines sent: none    Chart send with patient: Yes    Notified: spouse    Patient reassessed at: 4/13/2019 see flowsheet (date, time)

## 2019-04-13 NOTE — BRIEF OP NOTE
[facility]  Brief Operative Note  Neurosurgery    SUMMARY     Surgery Date: 4/12/2019     Surgeon(s) and Role:  Panel 1:     * Mk George MD - Primary     * Jasbir Santiago MD - Resident - Assisting  Panel 2:     * Robin Boyd MD - Primary        Pre-op Diagnosis:  DDD (degenerative disc disease), lumbar [M51.36]    Post-op Diagnosis: Post-Op Diagnosis Codes:     * DDD (degenerative disc disease), lumbar [M51.36]    Procedure Performed: Left L5-S1 ALIF with posterior percutaneous screws    Procedure(s) (LRB):  FUSION, SPINE, LUMBAR, ANTERIOR APPROACH Left L5-S1 Anterior to Psoa Interbody Fusion, L5-S1 Posterior Instrumentation (Left)  STENT, URETERAL placement  REPAIR, URETER    Technical Procedures Used: Left norma-lateral incision and approach which was fluoroscopically guided. Identification of L5-S1 and discectomy, with interbody graft placement. Intra-operative uretal repair with stent on the left by urology.  Internal closure with MADHURI drain of the Left anterolateral incision.  Repositioning on yee table, prone. L5-S1 posterior pedical screw placement under fluoro. Internal closure and staples.      Description of the findings of the procedure: See full op note    Estimated Blood Loss: 150cc         Specimens:   Specimen (12h ago, onward)    None

## 2019-04-13 NOTE — ASSESSMENT & PLAN NOTE
Elizaamanda Huff is a 59 y.o. female with intra op left ureteral injury    - Will start patient on Augmentin for 1 week course, has had pan sensitive E coli UTIs in the past  - Maintain correa, will see in clinic on Tuesday for removal  - Maintain MADHURI, will go home and record outputs q8 hrs, if output remains low after correa removal will consider removal on Thursday  - Hematuria expected following procedure  - Stent appears in good position on KUB, will keep in place for at least 2 months to allow healing

## 2019-04-13 NOTE — ASSESSMENT & PLAN NOTE
Patient with history of R L5 radiculopathy and severe back pain with neurogenic claudication. Now s/p elective ALIF (4/13) with ureteral injury intraop and primary repair.      --POD 1; admitted to floor on telemetry  --Pending L Spine x rays  --Brace when out of bed (at bedside)  --PT/OT/OOB today  --H+H stable post operatively  --Per urology, maintain correa catheter at all times, do not remove  --MADHURI drain at surgical site with minimal output  --Continue ancef 2g q8 hours

## 2019-04-13 NOTE — OP NOTE
DATE OF PROCEDURE: 04/12/2019            PREOPERATIVE DIAGNOSES:  1. L5-S1 degenerative disc disease and right foraminal stenosis with radiculopathy  2. Mechanical low back pain     POSTOPERATIVE DIAGNOSES:  1. L5-S1 degenerative disc disease and right foraminal stenosis with radiculopathy  2. Mechanical low back pain  3. Ureteral laceration      PROCEDURES:  1. Left anterior to the psoas minimally invasive access to the lumbar spine  2. L5-S1 anterior to the psoas interbody fusion with placement of expandable Rise L cages filled with allograft Trifecta and DBX  3. Repair of ureteral laceration (see urology note)  3. Posterior instrumentation at L5-S1  with Globus Creo MIS Screw System.  4. Intraoperative neuromonitoring  5. Fluoroscopy      PRIMARY SURGEON: Mk George M.D.    CO-SURGEON:  Robin Boyd MD performed left ureterostomy and left ureteral stent placement.      ASSISTANT SURGEON: Sean Phillips DO (RES), Yulissa Salas MD (RES), Hima Duncan MD (RES)     INDICATIONS: This is a 59-year-old female who was found to have L5-S1 degenerative disc disease with severe right foraminal stenosis and radiculopathy. Risks included paralysis, leg pain, low back pain, CSF leak, screw malpositioning, hardware failure or migration, reoperation, medical complications such as MI, pneumonia and death. The patient consented for surgery.         DESCRIPTION OF THE PROCEDURE:   The patient was intubated under general anesthesia.  She was placed in lateral decubitus right side down on the sliding table. All the pressure points were carefully padded. Fluoroscopic localization was then   utilized to identify the L5-S1 level. An oblique incision measuring 4  cm was then planned using fluoroscopy in front of the iliac crest at L5-S1. The patient was prepped and draped in the typical sterile fashion. After injection local anesthesia with Lidocaine with epi, and incision was made with a #15 blade. The subcutaneous tissue was  then dissected bluntly. Hemostasis was carried out with the bipolar. The external oblique, internal oblique and transversalis were then dissected bluntly.     We then dissected the retroperitoneal space. Serial dilators were then passed through the initial probe and a final retractor was placed at L5-S1 using fluoroscopy. Under direct look the soft tissue in front of the disk annulus was dissected bluntly.  The disc space was identified.  The retractor light system became dysfunctional and we lost lighting momentarily.  We had to change the light generator twice and the fiberoptic cable because it became melted.  The disc annulus was then sharply divided with the 15 blade.  We then noticed that the ureter was transected.  Urology was then consulted STAT.     I am referring to the urology op note for details of the ureter repair.  Briefly, confirmation of the greater laceration was done by injecting methylene blue IV which resulted in a blue coloration of the fluid coming from the ureter.  A guidewire was then inserted in the proximal and of the ureter and ureteral stent was placed proximally.  Same procedure was done distally and placement of the stent was completed.  An end-to-end ureter anastomosis was carried out with interrupted 4 0 Monocryl using the up-angled micro pituitary and knot pusher.    Diskectomy was carried out using rongeurs. A Mendez elevator was then inserted in the disk space and the contralateral annulus was opened to increase the disk height at that level. The discectomy was completed using serial robin and the endplates were scrapped. We were able to insert in the L5-S1 disc space a 8 mm tall trial. Once the endplates were cleaned from cartilage and decorticated we placed an expandable 6 degrees lordotic 8-15 mm x 18 x 50 mm cage filled with allograft Trifecta with fluoroscopic localization. The cage positioning was confirmed with fluoroscopy and the cage was expanded and post-filled with more  allograft DBX.     Hemostasis in the psoas muscle and the retractor was removed under direct look.    We placed in the retroperitoneal space a MADHURI drain that was tunneled laterally.       We irrigated. We added 0.5 g on vancomycin powder. The retroperitoneal space was closed by closing the external oblique fascia with 0 Vicryl. The subcutaneous layer was closed with 2-0 Vicryl inverted suture. The skin was closed with 4.0 Monocryl. The wounds were dressed.      He was then transferred prone on the Tomer table frame. All pressure points   were carefully padded.      The patient was prepped and draped in a typical sterile fashion. Using fluoroscopy, 2 incisions measuring 40-mm were planned bilaterally between L5-S1, 45 mm left and right to the midline and the incision were made with a #15 blade. Subcutaneous tissue hemostasis was completed.      Under dual fluoroscopic guidance and using the [employer] needles, we cannulated the pedicles of the L5 and S1 vertebrae bilaterally. We placed k-wires in each pedicle and the k-wires were clamped to the drape.       Then after dilating the fascia and tapping and muscles 6.5 x 40 mm pedicle screws were inserted with fluoroscopy at L5, 6.5 x 40 mm pedicle screws were inserted with fluoroscopy at S1. Pre-contoured titanium rods were inserted and the screw caps were placed on the reduction towers. The screw caps were tighten manually.          The thoracolumbar fascia was closed with interrupted 0 Vicryl. The subcutaneous layer was closed with inverted 2-0 Vicryl and the skin was closed with Monocryl.      The wounds were then dressed. The patient was then sent to recovery under the care of the anesthesiologist. The blood loss was 50 mL. The final count was completed and nothing was missing. The patient tolerated well the procedure.

## 2019-04-13 NOTE — NURSING
Spoke with Dr. Santiago concerning pt being a diabetic (DM2). States to put ADA diet order in and will do pt's correction scale as soon as possible.

## 2019-04-14 VITALS
DIASTOLIC BLOOD PRESSURE: 65 MMHG | RESPIRATION RATE: 18 BRPM | WEIGHT: 165.38 LBS | OXYGEN SATURATION: 93 % | BODY MASS INDEX: 28.24 KG/M2 | HEIGHT: 64 IN | HEART RATE: 97 BPM | TEMPERATURE: 100 F | SYSTOLIC BLOOD PRESSURE: 114 MMHG

## 2019-04-14 LAB
POCT GLUCOSE: 133 MG/DL (ref 70–110)
POCT GLUCOSE: 95 MG/DL (ref 70–110)

## 2019-04-14 PROCEDURE — 25000003 PHARM REV CODE 250: Performed by: STUDENT IN AN ORGANIZED HEALTH CARE EDUCATION/TRAINING PROGRAM

## 2019-04-14 RX ADMIN — POLYETHYLENE GLYCOL 3350 17 G: 17 POWDER, FOR SOLUTION ORAL at 09:04

## 2019-04-14 RX ADMIN — AMLODIPINE BESYLATE 10 MG: 10 TABLET ORAL at 09:04

## 2019-04-14 RX ADMIN — AMOXICILLIN AND CLAVULANATE POTASSIUM 1 TABLET: 875; 125 TABLET, FILM COATED ORAL at 09:04

## 2019-04-14 RX ADMIN — CYCLOBENZAPRINE HYDROCHLORIDE 10 MG: 5 TABLET, FILM COATED ORAL at 09:04

## 2019-04-14 RX ADMIN — METOPROLOL SUCCINATE 100 MG: 100 TABLET, EXTENDED RELEASE ORAL at 09:04

## 2019-04-14 RX ADMIN — CYCLOBENZAPRINE HYDROCHLORIDE 10 MG: 5 TABLET, FILM COATED ORAL at 03:04

## 2019-04-15 ENCOUNTER — TELEPHONE (OUTPATIENT)
Dept: UROLOGY | Facility: CLINIC | Age: 59
End: 2019-04-15

## 2019-04-15 ENCOUNTER — TELEPHONE (OUTPATIENT)
Dept: NEUROSURGERY | Facility: CLINIC | Age: 59
End: 2019-04-15

## 2019-04-15 NOTE — DISCHARGE SUMMARY
[facility]  Neurosurgery  Discharge Summary      Patient Name: Mariann Huff  MRN: [id number]  Admission Date: 4/12/2019  Hospital Length of Stay: 2 days  Discharge Date and Time:  04/14/2019 9:41 PM  Attending Physician: Mk George MD.   Discharging Provider: Jasbir Santiago MD  Primary Care Physician: Jasbir Haney MD    HPI: Ms. Mariann Huff is a 60 yo female with a longstanding history of low back pain in the L5-S1 region.  Her pain shoots down her R leg in an L5 distribution and is severe sometimes reaching 10/10. She has tried PT and PAULO to no relief.  Taken for elective L5/S1 ALIF.         Procedure(s) (LRB):  FUSION, SPINE, LUMBAR, ANTERIOR APPROACH Left L5-S1 Anterior to Psoa Interbody Fusion, L5-S1 Posterior Instrumentation (Left)  STENT, URETERAL placement  REPAIR, URETER     Indwelling Lines/Drains at time of discharge:  Lines/Drains/Airways     Drain                 Closed/Suction Drain 04/12/19 2115 Left Other (Comment) Bulb 10 Fr. 2 days         Urethral Catheter 04/12/19 1650 Non-latex;Straight-tip 2 days                Hospital Course: 4/13: POD 1. JADA ON, AF VSS. H+H stable, MADHURI with 2.5mL output. Has correa in place per urology recs. Has been drinking only since surgery without n/v and has not yet been out of bed.   4/14: residual small amount of DF weakness, otherwise was walking in the halls and stable for discharge.  X Rays of lumbar spine (standing) are obtained as well as KUB (per urology).         Consults: Urology for intraoperative ureteral stent and primary repair    Significant Diagnostic Studies: Labs:   BMP:   Recent Labs   Lab 04/13/19  0543   *      K 4.5      CO2 25   BUN 27*   CREATININE 1.4   CALCIUM 9.4   , CMP   Recent Labs   Lab 04/13/19  0543      K 4.5      CO2 25   *   BUN 27*   CREATININE 1.4   CALCIUM 9.4   ANIONGAP 13   ESTGFRAFRICA 47.8*   EGFRNONAA 41.4*    and CBC   Recent Labs   Lab 04/13/19  0543   WBC 13.58*  "  HGB 10.3*   HCT 32.3*        Microbiology:   Blood Culture   Lab Results   Component Value Date    LABBLOO NO GROWTH AFTER 4 DAYS - FINAL REPORT 03/01/2007    and Wound Culture  Pending Diagnostic Studies:     Procedure Component Value Units Date/Time    Basic metabolic panel [[id number]]     Order Status:  Sent Lab Status:  No result     Specimen:  Blood     CBC auto differential [[id number]]     Order Status:  Sent Lab Status:  No result     Specimen:  Blood     X-Ray Lumbar Spine Ap And Lateral [[id number]] Resulted:  04/13/19 2211    Order Status:  Sent Lab Status:  In process Updated:  04/13/19 2211          Final Active Diagnoses:    Diagnosis Date Noted POA    Left ureteral injury [S37.10XA] 04/13/2019 No    Lumbosacral radiculopathy at L5 [M54.17] 04/12/2019 Yes    Lumbar radiculopathy [M54.16] 02/06/2019 Yes      Problems Resolved During this Admission:       Discharged Condition: good    Follow Up: To Dr. George clinic in 1 week, arranged.  Follow up with Dr. Andrea clinic (Urology).      Patient Instructions:   No discharge procedures on file.  With patient.   Will maintain correa catheter and MADHURI drain until interval clinic follow-up next week.  Physical therapy will be arranged as an outpatient.     Medications:  Reconciled Home Medications:      Medication List      ASK your doctor about these medications    ACCU-CHEK EDIN PLUS METER Misc  Generic drug:  blood-glucose meter     albuterol 90 mcg/actuation inhaler  Commonly known as:  PROVENTIL/VENTOLIN HFA  Inhale 2 puffs into the lungs every 6 (six) hours as needed for Wheezing.     amLODIPine 10 MG tablet  Commonly known as:  NORVASC  TAKE 1 TABLET (10 MG TOTAL) BY MOUTH ONCE DAILY.     aspirin 81 mg Tab  Take 81 mg by mouth. 1 Tablet Oral Every day     atorvastatin 40 MG tablet  Commonly known as:  LIPITOR  TAKE 1 TABLET (40 MG TOTAL) BY MOUTH ONCE DAILY.     BD ULTRA-FINE MINI PEN NEEDLE 31 gauge x 3/16" Ndle  Generic drug:  pen needle, " diabetic  USE 4 TIMES A DAY     blood sugar diagnostic Strp  Commonly known as:  ACCU-CHEK EDIN PLUS TEST STRP  TEST BLOOD SUGARS 4 TIMES DAILY     chlorthalidone 50 MG Tab  Commonly known as:  HYGROTEN  Take 1 tablet (50 mg total) by mouth once daily.     cyclobenzaprine 10 MG tablet  Commonly known as:  FLEXERIL  TAKE 1 TABLET BY MOUTH 3 TIMES A DAY AS NEEDED FOR MUSCLE SPASMS. NO WORKING OR DRIVING ON THIS MED     esomeprazole 40 MG capsule  Commonly known as:  NEXIUM  TAKE 1 CAPSULE (40 MG TOTAL) BY MOUTH BEFORE BREAKFAST.     fenofibrate micronized 134 MG Cap  Commonly known as:  LOFIBRA  TAKE 1 CAPSULE (134 MG TOTAL) BY MOUTH BEFORE BREAKFAST.     flash glucose scanning reader Misc  Commonly known as:  FREESTYLE MISTI 14 DAY READER  1 each by Misc.(Non-Drug; Combo Route) route once daily.     flash glucose sensor Kit  Commonly known as:  FREESTYLE MISTI 14 DAY SENSOR  1 each by Misc.(Non-Drug; Combo Route) route once daily.     gabapentin 100 MG capsule  Commonly known as:  NEURONTIN  Take 2 capsules (200 mg total) by mouth every evening.     insulin aspart U-100 100 unit/mL Inpn pen  Commonly known as:  NovoLOG Flexpen U-100 Insulin  INJECT 35 U AM, 45 U AT NOON, 35 U PM.150-200 +2, 201-250 +4, 251-300 +6, 301-350 +8, >350 +10     insulin glargine (TOUJEO) 300 unit/mL (1.5 mL) Inpn pen  Commonly known as:  TOUJEO SOLOSTAR U-300 INSULIN  Inject 50 Units into the skin 2 (two) times daily.     INVOKANA 300 mg Tab tablet  Generic drug:  canagliflozin  Take 1 tablet (300 mg total) by mouth once daily.     irbesartan 300 MG tablet  Commonly known as:  AVAPRO  Take 1 tablet (300 mg total) by mouth every evening.     lancets 30 gauge Misc     metoprolol succinate 100 MG 24 hr tablet  Commonly known as:  TOPROL-XL  TAKE 1 TABLET (100 MG TOTAL) BY MOUTH ONCE DAILY.     nitroGLYCERIN 0.4 MG SL tablet  Commonly known as:  NITROSTAT  Take one every 2-3 min till chestpain relief for 3 times and if still no relief, call MD  "or come to ED     omega-3 acid ethyl esters 1 gram capsule  Commonly known as:  LOVAZA  Take 1 g by mouth once daily.     pen needle, diabetic 32 gauge x 5/32" Ndle  Commonly known as:  BD ULTRA-FINE GRABIEL PEN NEEDLE  For use with insulin pens daily 4 times a day     prasugrel 10 mg Tab  Commonly known as:  EFFIENT  TAKE 1 TABLET (10 MG TOTAL) BY MOUTH ONCE DAILY.     traMADol 50 mg tablet  Commonly known as:  ULTRAM  Take 1 tablet (50 mg total) by mouth 3 (three) times daily as needed for Pain.     TRULICITY 1.5 mg/0.5 mL Pnij  Generic drug:  dulaglutide  INJECT 1.5 MG INTO THE SKIN EVERY 7 DAYS.     zolpidem 5 MG Tab  Commonly known as:  AMBIEN  Take 1 tablet (5 mg total) by mouth nightly as needed.          Paper scripts:   Bactrim for 12 days  Percocet 5-325 q6 hours for 10 days  Diazepam 5mg q8 hours for 15 days  Rolling walker for Physical Therapy      Time spent on the discharge of patient: Discharged after 30 minutes    Jasbir Santiago MD  Neurosurgery  [facility]  "

## 2019-04-15 NOTE — TELEPHONE ENCOUNTER
Dr. Boyd instructed pt to come tomorrow for correa removal and for Thursday for drain removal. ashkan'ts made

## 2019-04-15 NOTE — TELEPHONE ENCOUNTER
As per Dr George, called pt to schedule post op clinic visit to check drain.  Pt verbalized understanding of scheduled appointment.

## 2019-04-15 NOTE — TELEPHONE ENCOUNTER
----- Message from Yulissa Salas MD sent at 4/13/2019  8:57 AM CDT -----  Mike Clark,     Can we please set this patient up to see Dr. Boyd in clinic on Tuesday and Thursday of this week for a post op visit.     Thanks,  Yulissa

## 2019-04-15 NOTE — PT/OT/SLP DISCHARGE
Physical Therapy Discharge Summary    Name: Mariann Huff  MRN: [id number]   Principal Problem: <principal problem not specified>     Patient Discharged from acute Physical Therapy on 2019.  Please refer to prior PT noted date on 2019 for functional status.     Assessment:     Patient was discharged unexpectedly.  Information required to complete an accurate discharge summary is unknown.  Refer to therapy initial evaluation and last progress note for initial and most recent functional status and goal achievement.  Recommendations made may be found in medical record.    Objective:     GOALS:   Multidisciplinary Problems     Physical Therapy Goals        Problem: Physical Therapy Goal    Goal Priority Disciplines Outcome Goal Variances Interventions   Physical Therapy Goal     PT, PT/OT Ongoing (interventions implemented as appropriate)     Description:  Goals to be met by: 2019     Patient will increase functional independence with mobility by performin. Supine to sit with Set-up [place]  2. Sit to supine with Set-up [place]  3. Sit to stand transfer with Stand-by Assistance  4. Gait  x 100 feet with Stand-by Assistance using Rolling Walker.   5. Stand for 10 minutes while performing dynamic standing balance activities without AD without loss of balance to reduce risk of falls.   20. Lower extremity exercise program x20 reps per handout, with independence.                    Reasons for Discontinuation of Therapy Services  Transfer to alternate level of care.      Plan:     Patient Discharged to: Outpatient Therapy Services.    Sarita Valdez, PT  4/15/2019

## 2019-04-15 NOTE — PT/OT/SLP DISCHARGE
Occupational Therapy Discharge Summary    Mariann Huff  MRN: [id number]   Principal Problem: s/p spine sx    Patient Discharged from acute Occupational Therapy on 4/14/2019.  Please refer to prior OT note dated 4/13/2019 for functional status.    Assessment:      Patient has not met goals.    Objective:     GOALS:   Multidisciplinary Problems     Occupational Therapy Goals     Not on file          Multidisciplinary Problems (Resolved)        Problem: Occupational Therapy Goal    Goal Priority Disciplines Outcome Interventions   Occupational Therapy Goal   (Resolved)     OT, PT/OT Outcome(s) achieved    Description:  Goals to be met by: 4/20     Patient will increase functional independence with ADLs by performing:    Donning TLSO while seated EOB with Set-up Assistance and Supervision.  LE Dressing (pants, brief) with Set-up Assistance and Contact Guard Assistance.  Grooming while standing at sink with Minimal Assistance.  Toileting from toilet with Contact Guard Assistance for hygiene and clothing management.   Toilet transfer to toilet with Contact Guard Assistance.  Functional mobility at short household distance for ADL task with RW and CGA.  Pt/CG verbalized understanding for 3/3 spinal precautions.                       Reasons for Discontinuation of Therapy Services  Transfer to alternate level of care.      Plan:     Patient Discharged to: Home; outpatient PT recommended. DME needs: rolling walker and shower chair-- discussed with bedside RN on 4/14/2019    PRIMITIVO Corley  4/15/2019

## 2019-04-16 ENCOUNTER — OFFICE VISIT (OUTPATIENT)
Dept: NEUROSURGERY | Facility: CLINIC | Age: 59
End: 2019-04-16
Payer: MEDICARE

## 2019-04-16 ENCOUNTER — OFFICE VISIT (OUTPATIENT)
Dept: UROLOGY | Facility: CLINIC | Age: 59
End: 2019-04-16
Payer: MEDICARE

## 2019-04-16 VITALS
HEIGHT: 64 IN | WEIGHT: 157.44 LBS | HEART RATE: 94 BPM | DIASTOLIC BLOOD PRESSURE: 72 MMHG | BODY MASS INDEX: 26.88 KG/M2 | SYSTOLIC BLOOD PRESSURE: 124 MMHG

## 2019-04-16 DIAGNOSIS — S37.10XA LEFT URETERAL INJURY, INITIAL ENCOUNTER: Primary | ICD-10-CM

## 2019-04-16 DIAGNOSIS — Z98.1 STATUS POST LUMBAR SPINAL FUSION: Primary | ICD-10-CM

## 2019-04-16 DIAGNOSIS — Z96.0 URETERAL STENT RETAINED: ICD-10-CM

## 2019-04-16 PROCEDURE — 99999 PR PBB SHADOW E&M-EST. PATIENT-LVL III: ICD-10-PCS | Mod: PBBFAC,,, | Performed by: NEUROLOGICAL SURGERY

## 2019-04-16 PROCEDURE — 99999 PR PBB SHADOW E&M-EST. PATIENT-LVL III: CPT | Mod: PBBFAC,,, | Performed by: NEUROLOGICAL SURGERY

## 2019-04-16 PROCEDURE — 99024 PR POST-OP FOLLOW-UP VISIT: ICD-10-PCS | Mod: S$GLB,,, | Performed by: NEUROLOGICAL SURGERY

## 2019-04-16 PROCEDURE — 99999 PR PBB SHADOW E&M-EST. PATIENT-LVL II: CPT | Mod: PBBFAC,,, | Performed by: UROLOGY

## 2019-04-16 PROCEDURE — 99999 PR PBB SHADOW E&M-EST. PATIENT-LVL II: ICD-10-PCS | Mod: PBBFAC,,, | Performed by: UROLOGY

## 2019-04-16 PROCEDURE — 99024 POSTOP FOLLOW-UP VISIT: CPT | Mod: S$GLB,,, | Performed by: NEUROLOGICAL SURGERY

## 2019-04-16 PROCEDURE — 99499 NO LOS: ICD-10-PCS | Mod: S$GLB,,, | Performed by: UROLOGY

## 2019-04-16 PROCEDURE — 99499 UNLISTED E&M SERVICE: CPT | Mod: S$GLB,,, | Performed by: UROLOGY

## 2019-04-16 RX ORDER — LIDOCAINE HYDROCHLORIDE 20 MG/ML
JELLY TOPICAL ONCE
Status: CANCELLED | OUTPATIENT
Start: 2019-04-16 | End: 2019-04-16

## 2019-04-16 RX ORDER — TAMSULOSIN HYDROCHLORIDE 0.4 MG/1
0.4 CAPSULE ORAL NIGHTLY
Qty: 30 CAPSULE | Refills: 2 | Status: ON HOLD | OUTPATIENT
Start: 2019-04-16 | End: 2019-07-02 | Stop reason: HOSPADM

## 2019-04-16 RX ORDER — CIPROFLOXACIN 250 MG/1
500 TABLET, FILM COATED ORAL ONCE
Status: CANCELLED | OUTPATIENT
Start: 2019-04-16 | End: 2019-04-16

## 2019-04-16 RX ORDER — FLUCONAZOLE 150 MG/1
150 TABLET ORAL DAILY
Qty: 1 TABLET | Refills: 0 | Status: SHIPPED | OUTPATIENT
Start: 2019-04-16 | End: 2019-04-17

## 2019-04-16 NOTE — PROGRESS NOTES
NEUROSURGICAL POST-OPERATIVE PROGRESS NOTE    DATE OF SERVICE:  04/16/2019      ATTENDING PHYSICIAN:  Mk George MD    SUBJECTIVE:    INTERIM HISTORY:    This is a very pleasant 59 y.o. y.o. female, who is status postop day 4 for left L5-S1 anterior to the psoas approach for interbody fusion with posterior instrumentation, intraoperative ureter laceration repaired/stent with Urology.  She saw Dr Boyd today in Urology.  The Fontenot catheter and the left-sided MADHURI drain has been removed.  She is walking without assistance.  Her right leg pain has improved compared to before surgery.  She is compliant with wearing her back brace.    Low Back Pain Scale  R Low Back-Pain Score: 5  R Low Back-Pain Intensity: The pain is bad, but I manage without taking pain killers  R Low Back-Pain Score: I need help every day in most aspects of self care  Low Back-Lifting: I can only lift very light weights   Low Back-Walking: I can only walk using a cane or crutches   Low Back-Sitting: I can sit only in my favorite chair as long as I like   Low Back-Standing: I cannot stand for longer than 10 minutes with increasing pain   Low Back-Sleeping: Because of pain my normal nights sleep is reduced by less than one quarter   Low Back-Social Life: Pain has restricted my social life and I do not go out very often   Low Back-Traveling: I have no pain when traveling   Low Back-Changing Degree of Pain: My pain fluctuates but is definitely getting better         OBJECTIVE:    PHYSICAL EXAMINATION:   Vitals:    04/16/19 1331   BP: 124/72   Pulse: 94       Neurosurgery Physical Exam    Ortho Exam    Neurologic Exam     Motor Exam Dorsiflexion on the right side 4+ out of 5       Wound CDI    DIAGNOSTIC DATA:        ASSESMENT:    This is a 59 y.o. female who is s/p postop day 4 L5 and S1 fusion, intraoperative ureter laceration primarily repaired/stent.  Doing well    Problem List Items Addressed This Visit     None      Visit Diagnoses     Status post  lumbar spinal fusion    -  Primary          PLAN:    Follow-up in 2 weeks for wound check and staple removal  Home health physical therapy        Mk George MD  Pager: [phone]

## 2019-04-17 NOTE — PROGRESS NOTES
CC: left ureteral injury repair    Mariann Huff is a 59 y.o. woman who is here for the evaluation of Post-op Evaluation (hosp follow up with correa removal )    Mariann Huff is a 59 y.o. female underwent left anterior L5-S1 interbody fusion who sustained a left ureteral transection on 19.  She is here to get her indwelling correa catheter removed.  MADHURI output is less than 25 ml total since her surgery.  Denies any bladder or left flank pain.  No fever or chills.    Procedure(s) Performed: 19  1.  Left ureteroureterostomy   2.  Left ureteral stent placement  Findings:    1.  Left ureter found to be completely transected  2.  Ends of ureter appeared healthy, no electrocautery was used  3.  End to end ureteroureterostomy performed with stent placement   Drains:   1.  6 Fr x 24 cm left JJ ureteral stent   2.  16 Fr correa catheter  3.  10 Fr MADHURI drain      Past Medical History:   Diagnosis Date    Anticoagulant long-term use     Asthma     Back pain     CAD (coronary artery disease)     s/p stentimg  (2), (1)    Carotid artery stenosis     Diabetes mellitus type 2 in obese     HTN (hypertension), benign     Hyperlipidemia     Keloid cicatrix     NPDR (nonproliferative diabetic retinopathy) 2015    NSTEMI (non-ST elevated myocardial infarction)     Nuclear sclerosis - Right Eye 3/18/2014    Primary localized osteoarthrosis, lower leg 2014    Sleep apnea      Past Surgical History:   Procedure Laterality Date    CARDIAC CATHETERIZATION      cataract extraction left eye      cataracts      CATHETERIZATION, HEART, LEFT Left 2014    Performed by Wilman Kim MD at [facility] CATH LAB     SECTION, LOW TRANSVERSE      CORONARY ANGIOPLASTY      ESOPHAGOGASTRODUODENOSCOPY (EGD) N/A 2016    Performed by Gardenia Adamson MD at [facility] ENDO (4TH FLR)    EXCISION TURBINATE, SUBMUCOUS      FUSION, SPINE, LUMBAR, ANTERIOR APPROACH Left L5-S1 Anterior to Psoa  Interbody Fusion, L5-S1 Posterior Instrumentation Left 4/12/2019    Performed by Mk George MD at [facility] OR 2ND FLR    HAND SURGERY Left     HAND SURGERY Right     torn ligament repair/ Dr. Yeboah    HYSTERECTOMY      Injection,steroid,epidural,transforaminal approach - Bilateral - S1 Bilateral 9/25/2018    Performed by Tl [facility]breu [facility] at [facility] PAIN MGT    Injection-steroid-epidural-caudal N/A 2/7/2019    Performed by Dave Bentley Jr., MD at [facility] PAIN MGT    INSERTION-INTRAOCULAR LENS (IOL) Right 9/1/2015    Performed by Good Domingo MD at [facility] OR 1ST FLR    left foot surgery      left wrist surgery      NASAL SEPTUM SURGERY  5/7/15    PHACOEMULSIFICATION-ASPIRATION-CATARACT Right 9/1/2015    Performed by Good Domingo MD at [facility] OR [facility] FLR    REPAIR, URETER  4/12/2019    [facility]    RESECTION-TURBINATES (SMR) N/A 5/7/2015    Performed by Dileep Dubois III, MD at [facility] OR 2ND FLR    rt elbow surgery      S/P LAD COATED STENT  05/14/2010    6 total     S/P OM1 STENT  08/2003    SEPTOPLASTY N/A 5/7/2015    [facility]    SINUS SURGERY      F.E.S.S.    SINUS SURGERY FUNCTIONAL ENDOSCOPIC WITH NAVIGATION WITH MAXILLARIES, ETHMOIDS, LEFT SPHENOID, LEFT LOLY N/A 5/7/2015    Performed [facility]ileep [facility] at [facility]    STENT, URETERAL placement  4/12/2019    [facility] Robin Boyd [facility]    TUBAL LIGATION       Social History     Tobacco Use    Smoking status: Never Smoker    Smokeless tobacco: Never Used   Substance Use Topics    Alcohol use: No     Alcohol/week: 0.0 oz    Drug use: No     Family History   Problem Relation Age of Onset    Diabetes Mother     Heart disease Mother     Diabetes Father     Leukemia Father         leukemia    Heart attack Father     Diabetes Sister     Diabetes Brother     Diabetes Sister     No Known  "Problems Sister     No Known Problems Brother     No Known Problems Brother     No Known Problems Maternal Grandmother     No Known Problems Maternal Grandfather     No Known Problems Paternal Grandmother     No Known Problems Paternal Grandfather     No Known Problems Son     No Known Problems Son     No Known Problems Maternal Aunt     No Known Problems Maternal Uncle     No Known Problems Paternal Aunt     No Known Problems Paternal Uncle     Colon cancer Neg Hx     Inflammatory bowel disease Neg Hx     Melanoma Neg Hx     Psoriasis Neg Hx     Lupus Neg Hx     Eczema Neg Hx     Acne Neg Hx     Amblyopia Neg Hx     Blindness Neg Hx     Cancer Neg Hx     Cataracts Neg Hx     Glaucoma Neg Hx     Hypertension Neg Hx     Macular degeneration Neg Hx     Retinal detachment Neg Hx     Strabismus Neg Hx     Stroke Neg Hx     Thyroid disease Neg Hx     Heart failure Neg Hx     Hyperlipidemia Neg Hx      Allergy:  Review of patient's allergies indicates:  No Known Allergies  Outpatient Encounter Medications as of 4/16/2019   Medication Sig Dispense Refill    ACCU-CHEK EDIN PLUS METER Misc       albuterol (PROVENTIL/VENTOLIN HFA) 90 mcg/actuation inhaler Inhale 2 puffs into the lungs every 6 (six) hours as needed for Wheezing. 1 each 11    amLODIPine (NORVASC) 10 MG tablet TAKE 1 TABLET (10 MG TOTAL) BY MOUTH ONCE DAILY. 30 tablet 6    aspirin 81 mg Tab Take 81 mg by mouth. 1 Tablet Oral Every day      atorvastatin (LIPITOR) 40 MG tablet TAKE 1 TABLET (40 MG TOTAL) BY MOUTH ONCE DAILY. 30 tablet 11    BD INSULIN PEN NEEDLE UF MINI 31 x 3/16 " Ndle USE 4 TIMES A  each 11    blood sugar diagnostic (ACCU-CHEK EDIN PLUS TEST STRP) Strp TEST BLOOD SUGARS 4 TIMES DAILY 150 strip 11    chlorthalidone (HYGROTEN) 50 MG Tab Take 1 tablet (50 mg total) by mouth once daily. 90 tablet 3    cyclobenzaprine (FLEXERIL) 10 MG tablet TAKE 1 TABLET BY MOUTH 3 TIMES A DAY AS NEEDED FOR MUSCLE " "SPASMS. NO WORKING OR DRIVING ON THIS MED 45 tablet 5    esomeprazole (NEXIUM) 40 MG capsule TAKE 1 CAPSULE (40 MG TOTAL) BY MOUTH BEFORE BREAKFAST. 90 capsule 3    fenofibrate micronized (LOFIBRA) 134 MG Cap TAKE 1 CAPSULE (134 MG TOTAL) BY MOUTH BEFORE BREAKFAST. 90 capsule 3    flash glucose scanning reader (FREESTYLE MISTI 14 DAY READER) Misc 1 each by Misc.(Non-Drug; Combo Route) route once daily. 2 each 11    flash glucose sensor (FREESTYLE MISTI 14 DAY SENSOR) Kit 1 each by Misc.(Non-Drug; Combo Route) route once daily. 2 kit 11    fluconazole (DIFLUCAN) 150 MG Tab Take 1 tablet (150 mg total) by mouth once daily. for 1 day 1 tablet 0    gabapentin (NEURONTIN) 100 MG capsule Take 2 capsules (200 mg total) by mouth every evening. 60 capsule 11    insulin aspart U-100 (NOVOLOG FLEXPEN U-100 INSULIN) 100 unit/mL InPn pen INJECT 35 U AM, 45 U AT NOON, 35 U PM.150-200 +2, 201-250 +4, 251-300 +6, 301-350 +8, >350 +10 30 Syringe 1    insulin glargine, TOUJEO, (TOUJEO SOLOSTAR U-300 INSULIN) 300 unit/mL (1.5 mL) InPn pen Inject 50 Units into the skin 2 (two) times daily. 6 Syringe 6    INVOKANA 300 mg Tab tablet Take 1 tablet (300 mg total) by mouth once daily. 30 tablet 6    irbesartan (AVAPRO) 300 MG tablet Take 1 tablet (300 mg total) by mouth every evening. 90 tablet 3    lancets 30 gauge Misc       metoprolol succinate (TOPROL-XL) 100 MG 24 hr tablet TAKE 1 TABLET (100 MG TOTAL) BY MOUTH ONCE DAILY. 30 tablet 11    nitroGLYCERIN (NITROSTAT) 0.4 MG SL tablet Take one every 2-3 min till chestpain relief for 3 times and if still no relief, call MD or come to ED 30 tablet 11    omega-3 acid ethyl esters (LOVAZA) 1 gram capsule Take 1 g by mouth once daily.       pen needle, diabetic (BD ULTRA-FINE GRABIEL PEN NEEDLES) 32 gauge x 5/32" Ndle For use with insulin pens daily 4 times a day 100 each 11    prasugrel (EFFIENT) 10 mg Tab TAKE 1 TABLET (10 MG TOTAL) BY MOUTH ONCE DAILY. 30 tablet 10    " tamsulosin (FLOMAX) 0.4 mg Cap Take 1 capsule (0.4 mg total) by mouth every evening. 30 capsule 2    tramadol (ULTRAM) 50 mg tablet Take 1 tablet (50 mg total) by mouth 3 (three) times daily as needed for Pain. 60 tablet 1    TRULICITY 1.5 mg/0.5 mL PnIj INJECT 1.5 MG INTO THE SKIN EVERY 7 DAYS. 2 mL 6    zolpidem (AMBIEN) 5 MG Tab Take 1 tablet (5 mg total) by mouth nightly as needed. 30 tablet 2     Facility-Administered Encounter Medications as of 4/16/2019   Medication Dose Route Frequency Provider Last Rate Last Dose    lidocaine (PF) 10 mg/ml (1%) injection 10 mg  1 mL Intradermal Once Dave Bentley Jr., MD         Review of Systems   ROS  Physical Exam   There were no vitals filed for this visit.  Physical Exam   Constitutional: She is oriented to person, place, and time. She appears well-developed and well-nourished. No distress.   Wears a back support belt.   HENT:   Head: Normocephalic and atraumatic.   Right Ear: External ear normal.   Left Ear: External ear normal.   Nose: Nose normal.   Eyes: Conjunctivae and EOM are normal. Pupils are equal, round, and reactive to light. No scleral icterus.   Neck: Normal range of motion. Neck supple. No JVD present. No tracheal deviation present. No thyromegaly present.   Cardiovascular: Normal rate, regular rhythm, normal heart sounds and intact distal pulses.  Exam reveals no gallop and no friction rub.    No murmur heard.  Pulmonary/Chest: Effort normal and breath sounds normal. No respiratory distress. She has no wheezes.   Abdominal: Soft. Bowel sounds are normal. She exhibits no distension and no mass. There is no tenderness. There is no rebound and no guarding.   Genitourinary: Vagina normal and uterus normal. No vaginal discharge found.   Genitourinary Comments: MADHURI drain from left flank showed less than 20 ml serous fluid.  Wound was intact at the MADHURI drainage site.  MADHURI drain removed intact without any problem.    Fontenot catheter also removed without any  problem.   Musculoskeletal: Normal range of motion. She exhibits no edema, tenderness or deformity.   Lymphadenopathy:     She has no cervical adenopathy.   Neurological: She is alert and oriented to person, place, and time.   Skin: Skin is warm and dry. She is not diaphoretic.     Psychiatric: She has a normal mood and affect. Her behavior is normal. Thought content normal.         LABS:  Lab Results   Component Value Date    CREATININE 1.4 04/13/2019    CREATININE 1.4 04/04/2019    CREATININE 1.0 10/22/2018     Urine Culture, Routine   Date Value Ref Range Status   08/16/2018 ESCHERICHIA COLI  50,000 - 99,999 cfu/ml    Final     Radiology  Post op KUB showed left ureteral stent in good position.    Assessment and Plan:  Mariann was seen today for post-op evaluation.    Diagnoses and all orders for this visit:    Left ureteral injury, initial encounter    Ureteral stent retained  -     tamsulosin (FLOMAX) 0.4 mg Cap; Take 1 capsule (0.4 mg total) by mouth every evening.  -     X-Ray Abdomen AP 1 View; Future  -     fluconazole (DIFLUCAN) 150 MG Tab; Take 1 tablet (150 mg total) by mouth once daily. for 1 day  -     Cystoscopy with Stent Removal; Future    Other orders  -     lidocaine HCl 2% urojet  -     ciprofloxacin HCl tablet 500 mg      MADHURI and correa catheter removed without any problem.  She has been taking Bactrim DS since her surgery.  Recommend to finish taking Bactrimd.  Recommend to take yogurt.  May take diflucan at the end of bactrim ds.    Will follow her in 2 months with KUB.  Then may consider remove her left ureteral stent with cysto based on her spinal surgery recovery in 2 to 3 months.  She is tentatively scheduled for cysto left ureteral stent removal on 6/20/18, but may delay it up to 3 months from her surgery based on her neurosurgery recovery.  All questions answered.    Follow-up:  Follow up in about 2 months (around 6/16/2019) for KUB.

## 2019-04-20 ENCOUNTER — ANESTHESIA (OUTPATIENT)
Dept: SURGERY | Facility: HOSPITAL | Age: 59
DRG: 003 | End: 2019-04-20
Payer: MEDICARE

## 2019-04-20 ENCOUNTER — ANESTHESIA EVENT (OUTPATIENT)
Dept: SURGERY | Facility: HOSPITAL | Age: 59
DRG: 003 | End: 2019-04-20
Payer: MEDICARE

## 2019-04-20 ENCOUNTER — HOSPITAL ENCOUNTER (INPATIENT)
Facility: HOSPITAL | Age: 59
LOS: 46 days | Discharge: NURSING, REHAB OR LONG-TERM CARE | DRG: 003 | End: 2019-06-05
Attending: EMERGENCY MEDICINE | Admitting: INTERNAL MEDICINE
Payer: MEDICARE

## 2019-04-20 DIAGNOSIS — N39.0 URINARY TRACT INFECTION WITHOUT HEMATURIA, SITE UNSPECIFIED: ICD-10-CM

## 2019-04-20 DIAGNOSIS — S37.10XS: ICD-10-CM

## 2019-04-20 DIAGNOSIS — E87.20 LACTIC ACIDOSIS: ICD-10-CM

## 2019-04-20 DIAGNOSIS — R00.0 TACHYCARDIA: ICD-10-CM

## 2019-04-20 DIAGNOSIS — S37.10XA: ICD-10-CM

## 2019-04-20 DIAGNOSIS — T81.40XD POSTOPERATIVE INFECTION, UNSPECIFIED TYPE, SUBSEQUENT ENCOUNTER: ICD-10-CM

## 2019-04-20 DIAGNOSIS — Z93.1 STATUS POST INSERTION OF PERCUTANEOUS ENDOSCOPIC GASTROSTOMY (PEG) TUBE: ICD-10-CM

## 2019-04-20 DIAGNOSIS — Z98.1 S/P LUMBAR FUSION: ICD-10-CM

## 2019-04-20 DIAGNOSIS — R00.1 BRADYCARDIA: ICD-10-CM

## 2019-04-20 DIAGNOSIS — J95.821 ACUTE POSTOPERATIVE RESPIRATORY FAILURE: ICD-10-CM

## 2019-04-20 DIAGNOSIS — A41.9 SEPSIS, DUE TO UNSPECIFIED ORGANISM: Primary | ICD-10-CM

## 2019-04-20 DIAGNOSIS — N17.9 AKI (ACUTE KIDNEY INJURY): ICD-10-CM

## 2019-04-20 DIAGNOSIS — D64.9 ANEMIA, UNSPECIFIED TYPE: ICD-10-CM

## 2019-04-20 DIAGNOSIS — I25.83 CORONARY ARTERY DISEASE DUE TO LIPID RICH PLAQUE: ICD-10-CM

## 2019-04-20 DIAGNOSIS — Z78.9 ON ENTERAL NUTRITION: ICD-10-CM

## 2019-04-20 DIAGNOSIS — I49.9 ABNORMAL HEART RHYTHM: ICD-10-CM

## 2019-04-20 DIAGNOSIS — E11.51 TYPE 2 DIABETES MELLITUS WITH DIABETIC PERIPHERAL ANGIOPATHY WITHOUT GANGRENE, UNSPECIFIED WHETHER LONG TERM INSULIN USE: Chronic | ICD-10-CM

## 2019-04-20 DIAGNOSIS — R41.82 ALTERED MENTAL STATUS, UNSPECIFIED ALTERED MENTAL STATUS TYPE: ICD-10-CM

## 2019-04-20 DIAGNOSIS — A41.9 SEPSIS: ICD-10-CM

## 2019-04-20 DIAGNOSIS — J81.0 ACUTE PULMONARY EDEMA: ICD-10-CM

## 2019-04-20 DIAGNOSIS — E87.20 ACIDOSIS: ICD-10-CM

## 2019-04-20 DIAGNOSIS — I10 ESSENTIAL HYPERTENSION: ICD-10-CM

## 2019-04-20 DIAGNOSIS — K62.5 BRBPR (BRIGHT RED BLOOD PER RECTUM): ICD-10-CM

## 2019-04-20 DIAGNOSIS — Z99.11 ENCOUNTER FOR WEANING FROM VENTILATOR: ICD-10-CM

## 2019-04-20 DIAGNOSIS — I25.10 CORONARY ARTERY DISEASE DUE TO LIPID RICH PLAQUE: ICD-10-CM

## 2019-04-20 DIAGNOSIS — E78.5 HYPERLIPIDEMIA, UNSPECIFIED HYPERLIPIDEMIA TYPE: ICD-10-CM

## 2019-04-20 DIAGNOSIS — J81.1 PULMONARY EDEMA: ICD-10-CM

## 2019-04-20 DIAGNOSIS — I82.411 ACUTE DEEP VEIN THROMBOSIS (DVT) OF FEMORAL VEIN OF RIGHT LOWER EXTREMITY: ICD-10-CM

## 2019-04-20 DIAGNOSIS — G93.40 ENCEPHALOPATHY: ICD-10-CM

## 2019-04-20 DIAGNOSIS — T81.43XA INFECTION OF ORGAN OR ORGAN SPACE AFTER SURGERY, INITIAL ENCOUNTER: ICD-10-CM

## 2019-04-20 DIAGNOSIS — Z51.5 PALLIATIVE CARE ENCOUNTER: ICD-10-CM

## 2019-04-20 DIAGNOSIS — S37.10XD: ICD-10-CM

## 2019-04-20 DIAGNOSIS — R00.1 BRADYCARDIA, UNSPECIFIED: ICD-10-CM

## 2019-04-20 DIAGNOSIS — E87.20 METABOLIC ACIDOSIS: ICD-10-CM

## 2019-04-20 DIAGNOSIS — K62.6 RECTAL ULCER: ICD-10-CM

## 2019-04-20 DIAGNOSIS — Z74.09 IMPAIRED FUNCTIONAL MOBILITY AND ENDURANCE: ICD-10-CM

## 2019-04-20 DIAGNOSIS — E46 PROTEIN MALNUTRITION: ICD-10-CM

## 2019-04-20 DIAGNOSIS — G47.33 OBSTRUCTIVE SLEEP APNEA SYNDROME: Chronic | ICD-10-CM

## 2019-04-20 DIAGNOSIS — R52 PAIN: ICD-10-CM

## 2019-04-20 PROBLEM — Z79.4 TYPE 2 DIABETES MELLITUS, WITH LONG-TERM CURRENT USE OF INSULIN: Status: ACTIVE | Noted: 2019-04-20

## 2019-04-20 PROBLEM — J45.909 ASTHMA: Status: ACTIVE | Noted: 2019-04-20

## 2019-04-20 PROBLEM — E11.9 TYPE 2 DIABETES MELLITUS, WITH LONG-TERM CURRENT USE OF INSULIN: Status: ACTIVE | Noted: 2019-04-20

## 2019-04-20 LAB
ALBUMIN SERPL BCP-MCNC: 2.8 G/DL (ref 3.5–5.2)
ALLENS TEST: ABNORMAL
ALP SERPL-CCNC: 171 U/L (ref 55–135)
ALT SERPL W/O P-5'-P-CCNC: 35 U/L (ref 10–44)
ANION GAP SERPL CALC-SCNC: 15 MMOL/L (ref 8–16)
ANISOCYTOSIS BLD QL SMEAR: SLIGHT
AST SERPL-CCNC: 49 U/L (ref 10–40)
BACTERIA #/AREA URNS AUTO: ABNORMAL /HPF
BASOPHILS # BLD AUTO: 0.01 K/UL (ref 0–0.2)
BASOPHILS NFR BLD: 0.2 % (ref 0–1.9)
BILIRUB SERPL-MCNC: 1.5 MG/DL (ref 0.1–1)
BILIRUB UR QL STRIP: NEGATIVE
BUN SERPL-MCNC: 66 MG/DL (ref 6–20)
CALCIUM SERPL-MCNC: 10 MG/DL (ref 8.7–10.5)
CHLORIDE SERPL-SCNC: 96 MMOL/L (ref 95–110)
CLARITY UR REFRACT.AUTO: ABNORMAL
CO2 SERPL-SCNC: 23 MMOL/L (ref 23–29)
COLOR UR AUTO: YELLOW
CREAT SERPL-MCNC: 3.6 MG/DL (ref 0.5–1.4)
CRP SERPL-MCNC: 336.8 MG/L (ref 0–8.2)
DELSYS: ABNORMAL
DIFFERENTIAL METHOD: ABNORMAL
DOHLE BOD BLD QL SMEAR: PRESENT
EOSINOPHIL # BLD AUTO: 0 K/UL (ref 0–0.5)
EOSINOPHIL NFR BLD: 0 % (ref 0–8)
ERYTHROCYTE [DISTWIDTH] IN BLOOD BY AUTOMATED COUNT: 12.8 % (ref 11.5–14.5)
ERYTHROCYTE [SEDIMENTATION RATE] IN BLOOD BY WESTERGREN METHOD: 99 MM/HR (ref 0–36)
EST. GFR  (AFRICAN AMERICAN): 15.3 ML/MIN/1.73 M^2
EST. GFR  (NON AFRICAN AMERICAN): 13.2 ML/MIN/1.73 M^2
GLUCOSE SERPL-MCNC: 376 MG/DL (ref 70–110)
GLUCOSE UR QL STRIP: ABNORMAL
HCO3 UR-SCNC: 17.3 MMOL/L (ref 24–28)
HCT VFR BLD AUTO: 29.9 % (ref 37–48.5)
HGB BLD-MCNC: 9.3 G/DL (ref 12–16)
HGB UR QL STRIP: ABNORMAL
HYALINE CASTS UR QL AUTO: 0 /LPF
IMM GRANULOCYTES # BLD AUTO: 0.02 K/UL (ref 0–0.04)
IMM GRANULOCYTES NFR BLD AUTO: 0.4 % (ref 0–0.5)
INFLUENZA A, MOLECULAR: NEGATIVE
INFLUENZA B, MOLECULAR: NEGATIVE
KETONES UR QL STRIP: NEGATIVE
LACTATE SERPL-SCNC: 4.6 MMOL/L (ref 0.5–2.2)
LACTATE SERPL-SCNC: 4.6 MMOL/L (ref 0.5–2.2)
LEUKOCYTE ESTERASE UR QL STRIP: ABNORMAL
LYMPHOCYTES # BLD AUTO: 0.3 K/UL (ref 1–4.8)
LYMPHOCYTES NFR BLD: 5 % (ref 18–48)
MAGNESIUM SERPL-MCNC: 2.3 MG/DL (ref 1.6–2.6)
MCH RBC QN AUTO: 26.4 PG (ref 27–31)
MCHC RBC AUTO-ENTMCNC: 31.1 G/DL (ref 32–36)
MCV RBC AUTO: 85 FL (ref 82–98)
MICROSCOPIC COMMENT: ABNORMAL
MODE: ABNORMAL
MONOCYTES # BLD AUTO: 0.1 K/UL (ref 0.3–1)
MONOCYTES NFR BLD: 1 % (ref 4–15)
NEUTROPHILS # BLD AUTO: 4.7 K/UL (ref 1.8–7.7)
NEUTROPHILS NFR BLD: 93.4 % (ref 38–73)
NITRITE UR QL STRIP: NEGATIVE
NRBC BLD-RTO: 0 /100 WBC
PCO2 BLDA: 24.7 MMHG (ref 35–45)
PH SMN: 7.45 [PH] (ref 7.35–7.45)
PH UR STRIP: 5 [PH] (ref 5–8)
PLATELET # BLD AUTO: 299 K/UL (ref 150–350)
PLATELET BLD QL SMEAR: ABNORMAL
PMV BLD AUTO: 10.2 FL (ref 9.2–12.9)
PO2 BLDA: 61 MMHG (ref 80–100)
POC BE: -7 MMOL/L
POC SATURATED O2: 93 % (ref 95–100)
POC TCO2: 18 MMOL/L (ref 23–27)
POCT GLUCOSE: 390 MG/DL (ref 70–110)
POLYCHROMASIA BLD QL SMEAR: ABNORMAL
POTASSIUM SERPL-SCNC: 4.3 MMOL/L (ref 3.5–5.1)
PROT SERPL-MCNC: 7.8 G/DL (ref 6–8.4)
PROT UR QL STRIP: ABNORMAL
RBC # BLD AUTO: 3.52 M/UL (ref 4–5.4)
RBC #/AREA URNS AUTO: 3 /HPF (ref 0–4)
SAMPLE: ABNORMAL
SITE: ABNORMAL
SODIUM SERPL-SCNC: 134 MMOL/L (ref 136–145)
SP GR UR STRIP: 1.01 (ref 1–1.03)
SP02: 98
SPECIMEN SOURCE: NORMAL
SQUAMOUS #/AREA URNS AUTO: 1 /HPF
TOXIC GRANULES BLD QL SMEAR: PRESENT
URN SPEC COLLECT METH UR: ABNORMAL
WBC # BLD AUTO: 5.01 K/UL (ref 3.9–12.7)
WBC #/AREA URNS AUTO: >100 /HPF (ref 0–5)
YEAST UR QL AUTO: ABNORMAL

## 2019-04-20 PROCEDURE — 87502 INFLUENZA DNA AMP PROBE: CPT

## 2019-04-20 PROCEDURE — 99292 CRITICAL CARE ADDL 30 MIN: CPT | Mod: ,,, | Performed by: EMERGENCY MEDICINE

## 2019-04-20 PROCEDURE — 87040 BLOOD CULTURE FOR BACTERIA: CPT | Mod: 59

## 2019-04-20 PROCEDURE — 99291 CRITICAL CARE FIRST HOUR: CPT | Mod: 25

## 2019-04-20 PROCEDURE — 85025 COMPLETE CBC W/AUTO DIFF WBC: CPT

## 2019-04-20 PROCEDURE — 63600175 PHARM REV CODE 636 W HCPCS: Performed by: EMERGENCY MEDICINE

## 2019-04-20 PROCEDURE — 83605 ASSAY OF LACTIC ACID: CPT | Mod: 91

## 2019-04-20 PROCEDURE — 99900035 HC TECH TIME PER 15 MIN (STAT)

## 2019-04-20 PROCEDURE — 99291 CRITICAL CARE FIRST HOUR: CPT | Mod: ,,, | Performed by: EMERGENCY MEDICINE

## 2019-04-20 PROCEDURE — 87086 URINE CULTURE/COLONY COUNT: CPT

## 2019-04-20 PROCEDURE — 93005 ELECTROCARDIOGRAM TRACING: CPT

## 2019-04-20 PROCEDURE — 81001 URINALYSIS AUTO W/SCOPE: CPT

## 2019-04-20 PROCEDURE — 87077 CULTURE AEROBIC IDENTIFY: CPT

## 2019-04-20 PROCEDURE — 96367 TX/PROPH/DG ADDL SEQ IV INF: CPT

## 2019-04-20 PROCEDURE — 25000003 PHARM REV CODE 250: Performed by: EMERGENCY MEDICINE

## 2019-04-20 PROCEDURE — 87186 SC STD MICRODIL/AGAR DIL: CPT

## 2019-04-20 PROCEDURE — 87205 SMEAR GRAM STAIN: CPT

## 2019-04-20 PROCEDURE — 96365 THER/PROPH/DIAG IV INF INIT: CPT

## 2019-04-20 PROCEDURE — 99291 PR CRITICAL CARE, E/M 30-74 MINUTES: ICD-10-PCS | Mod: ,,, | Performed by: EMERGENCY MEDICINE

## 2019-04-20 PROCEDURE — 93010 ELECTROCARDIOGRAM REPORT: CPT | Mod: ,,, | Performed by: INTERNAL MEDICINE

## 2019-04-20 PROCEDURE — 85652 RBC SED RATE AUTOMATED: CPT

## 2019-04-20 PROCEDURE — 20000000 HC ICU ROOM

## 2019-04-20 PROCEDURE — 86140 C-REACTIVE PROTEIN: CPT

## 2019-04-20 PROCEDURE — 96361 HYDRATE IV INFUSION ADD-ON: CPT

## 2019-04-20 PROCEDURE — 93010 EKG 12-LEAD: ICD-10-PCS | Mod: ,,, | Performed by: INTERNAL MEDICINE

## 2019-04-20 PROCEDURE — 63600175 PHARM REV CODE 636 W HCPCS: Performed by: STUDENT IN AN ORGANIZED HEALTH CARE EDUCATION/TRAINING PROGRAM

## 2019-04-20 PROCEDURE — 99292 CRITICAL CARE ADDL 30 MIN: CPT | Mod: 25

## 2019-04-20 PROCEDURE — 50900: ICD-10-PCS | Mod: 58,LT,, | Performed by: UROLOGY

## 2019-04-20 PROCEDURE — 80053 COMPREHEN METABOLIC PANEL: CPT

## 2019-04-20 PROCEDURE — 36600 WITHDRAWAL OF ARTERIAL BLOOD: CPT

## 2019-04-20 PROCEDURE — 82803 BLOOD GASES ANY COMBINATION: CPT

## 2019-04-20 PROCEDURE — 96372 THER/PROPH/DIAG INJ SC/IM: CPT

## 2019-04-20 PROCEDURE — 99292 PR CRITICAL CARE, ADDL 30 MIN: ICD-10-PCS | Mod: ,,, | Performed by: EMERGENCY MEDICINE

## 2019-04-20 PROCEDURE — 83735 ASSAY OF MAGNESIUM: CPT

## 2019-04-20 PROCEDURE — 82962 GLUCOSE BLOOD TEST: CPT

## 2019-04-20 PROCEDURE — 50900 REPAIR OF URETER: CPT | Mod: 58,LT,, | Performed by: UROLOGY

## 2019-04-20 PROCEDURE — 87088 URINE BACTERIA CULTURE: CPT

## 2019-04-20 RX ORDER — INSULIN ASPART 100 [IU]/ML
10 INJECTION, SOLUTION INTRAVENOUS; SUBCUTANEOUS ONCE
Status: COMPLETED | OUTPATIENT
Start: 2019-04-20 | End: 2019-04-20

## 2019-04-20 RX ORDER — INSULIN ASPART 100 [IU]/ML
1-10 INJECTION, SOLUTION INTRAVENOUS; SUBCUTANEOUS EVERY 6 HOURS PRN
Status: DISCONTINUED | OUTPATIENT
Start: 2019-04-20 | End: 2019-04-21

## 2019-04-20 RX ORDER — VANCOMYCIN HCL IN 5 % DEXTROSE 1.5G/250ML
20 PLASTIC BAG, INJECTION (ML) INTRAVENOUS
Status: COMPLETED | OUTPATIENT
Start: 2019-04-20 | End: 2019-04-20

## 2019-04-20 RX ORDER — IPRATROPIUM BROMIDE AND ALBUTEROL SULFATE 2.5; .5 MG/3ML; MG/3ML
3 SOLUTION RESPIRATORY (INHALATION) EVERY 4 HOURS PRN
Status: DISCONTINUED | OUTPATIENT
Start: 2019-04-20 | End: 2019-06-05 | Stop reason: HOSPADM

## 2019-04-20 RX ORDER — NITROGLYCERIN 0.3 MG/1
0.3 TABLET SUBLINGUAL EVERY 5 MIN PRN
Status: DISCONTINUED | OUTPATIENT
Start: 2019-04-20 | End: 2019-05-08

## 2019-04-20 RX ORDER — SODIUM CHLORIDE 0.9 % (FLUSH) 0.9 %
10 SYRINGE (ML) INJECTION
Status: DISCONTINUED | OUTPATIENT
Start: 2019-04-20 | End: 2019-06-05 | Stop reason: HOSPADM

## 2019-04-20 RX ORDER — ACETAMINOPHEN 325 MG/1
650 TABLET ORAL
Status: COMPLETED | OUTPATIENT
Start: 2019-04-20 | End: 2019-04-20

## 2019-04-20 RX ORDER — GLUCAGON 1 MG
1 KIT INJECTION
Status: DISCONTINUED | OUTPATIENT
Start: 2019-04-20 | End: 2019-04-21

## 2019-04-20 RX ADMIN — VANCOMYCIN HYDROCHLORIDE 1500 MG: 100 INJECTION, POWDER, LYOPHILIZED, FOR SOLUTION INTRAVENOUS at 07:04

## 2019-04-20 RX ADMIN — INSULIN ASPART 10 UNITS: 100 INJECTION, SOLUTION INTRAVENOUS; SUBCUTANEOUS at 09:04

## 2019-04-20 RX ADMIN — ACETAMINOPHEN 650 MG: 325 TABLET ORAL at 05:04

## 2019-04-20 RX ADMIN — SODIUM CHLORIDE 1641 ML: 0.9 INJECTION, SOLUTION INTRAVENOUS at 05:04

## 2019-04-20 RX ADMIN — PIPERACILLIN AND TAZOBACTAM 4.5 G: 4; .5 INJECTION, POWDER, LYOPHILIZED, FOR SOLUTION INTRAVENOUS; PARENTERAL at 06:04

## 2019-04-20 NOTE — ED TRIAGE NOTES
Mariann Huff, a 59 y.o. female presents to the ED altered mental status that began this morning and high fever that we are unknown when it began.  Pt had several blabnkets and a heater on her at her home when EMS arrived.  Pt had recent back surgery 4/12/2019.        Chief Complaint   Patient presents with    Altered Mental Status     [facility], back surgery last week, c/o malaise, confusion, fever, dizziness.      Review of patient's allergies indicates:  No Known Allergies  Past Medical History:   Diagnosis Date    Anticoagulant long-term use     Asthma     Back pain     CAD (coronary artery disease)     s/p stentimg 2003 (2),2009 (1)    Carotid artery stenosis     Diabetes mellitus type 2 in obese     HTN (hypertension), benign     Hyperlipidemia     Keloid cicatrix     NPDR (nonproliferative diabetic retinopathy) 8/17/2015    NSTEMI (non-ST elevated myocardial infarction)     Nuclear sclerosis - Right Eye 3/18/2014    Primary localized osteoarthrosis, lower leg 6/18/2014    Sleep apnea      LOC: Patient name and date of birth verified. The patient is awake, alert and aware of environment with an appropriate affect, the patient is oriented x 3 and speaking appropriately.   APPEARANCE: Patient resting comfortably, patient is clean and well groomed, patient's clothing is properly fastened.  SKIN: The skin is warm and dry, color consistent with ethnicity, patient has normal skin turgor and moist mucus membranes.  MUSCULOSKELETAL: Patient moving all extremities well, no obvious swelling or deformities noted.   RESPIRATORY: Respirations are spontaneous, patient has a normal effort and rate, no accessory muscle use noted.  CARDIAC: Patient in sinus tachycardia, no periphreal edema noted, capillary refill < 3 seconds.   ABDOMEN: Soft and tender to palpation, slight distention noted. Bowel sounds present in all four quadrants. LLQ pain with palpation   NEUROLOGIC: Eyes  open spontaneously, behavior appropriate to situation, follows commands but slow to respond, facial expression symmetrical, bilateral hand grasp equal and even, purposeful motor response noted, normal sensation in all extremities when touched with a finger.

## 2019-04-20 NOTE — ED PROVIDER NOTES
Encounter Date: 2019    SCRIBE #1 NOTE: I, Renay Russo, am scribing for, and in the presence of,  Dr. Jenkins. I have scribed the entire note.       History     Chief Complaint   Patient presents with    Altered Mental Status     brought in by [facility], from home, back surgery last week, c/o malaise, confusion, fever, dizziness.      Time patient was seen by the provider: 5:05 PM      Ms. Hfuf is a 59 y.o. female with a history of HTN, HLN, DM type 2, nonproliferative diabetic renopathy, CAD. who presents to the ED with a complaint of altered mental status. She was brought in via EMS. EMS personnel received the call after her family noticed that she was febrile and altered. She had a fusion of left lumbar spine by anterior approach last week. She endorses fever.       The history is provided by the patient. History limited by: Altered mental status.      Review of patient's allergies indicates:  No Known Allergies  Past Medical History:   Diagnosis Date    Anticoagulant long-term use     Asthma     Back pain     CAD (coronary artery disease)     s/p stentimg  (2), (1)    Carotid artery stenosis     Diabetes mellitus type 2 in obese     HTN (hypertension), benign     Hyperlipidemia     Keloid cicatrix     NPDR (nonproliferative diabetic retinopathy) 2015    NSTEMI (non-ST elevated myocardial infarction)     Nuclear sclerosis - Right Eye 3/18/2014    Primary localized osteoarthrosis, lower leg 2014    Sleep apnea      Past Surgical History:   Procedure Laterality Date    CARDIAC CATHETERIZATION      cataract extraction left eye      cataracts      CATHETERIZATION, HEART, LEFT Left 2014    Performed by [facility]man Kim MD at [facility] CATH LAB     SECTION, LOW TRANSVERSE      CORONARY ANGIOPLASTY      Creation, Nephrostomy, Percutaneous Left 2019    Performed by Karina Surgeon at [facility] KARINA    ESOPHAGOGASTRODUODENOSCOPY (EGD) N/A 2016     Performed by Gardenia Adamson MD at [facility] ENDO (4TH FLR)    EXCISION TURBINATE, SUBMUCOUS      FUSION, SPINE, LUMBAR, ANTERIOR APPROACH Left L5-S1 Anterior to Psoa Interbody Fusion, L5-S1 Posterior Instrumentation Left 4/12/2019    Performed by Mk George MD at [facility] OR 2ND FLR    HAND SURGERY Left     HAND SURGERY Right     torn ligament repair/ Dr. Yeboah    HYSTERECTOMY      Injection,steroid,epidural,transforaminal approach - Bilateral - S1 Bilateral 9/25/2018    Performed by Tl Abreu MD at [facility] PAIN MGT    Injection-steroid-epidural-caudal N/A 2/7/2019    Performed by Dave Bentley Jr., MD at [facility] PAIN MGT    INSERTION-INTRAOCULAR LENS (IOL) Right 9/1/2015    Performed by Good Domingo MD at [facility] OR 1ST FLR    LAPAROTOMY, EXPLORATORY; LIGATION OF LEFT URETER; DOUBLE J STENT REMOVAL LEFT URETER Left 4/20/2019    Performed by Paul Carlson MD at [facility] OR 2ND FLR    left foot surgery      left wrist surgery      NASAL SEPTUM SURGERY  5/7/15    PHACOEMULSIFICATION-ASPIRATION-CATARACT Right 9/1/2015    Performed by Good Domingo MD at [facility] OR 1ST FLR    REPAIR, URETER  4/12/2019    Performed by Mk George MD at [facility] OR 2ND FLR    RESECTION-TURBINATES (SMR) N/A 5/7/2015    Performed by Dileep Dubois III, MD at [facility] OR 2ND FLR    rt elbow surgery      S/P LAD COATED STENT  05/14/2010    6 total     S/P OM1 STENT  08/2003    SEPTOPLASTY N/A 5/7/2015    Performed by Dileep Dubois III, MD at [facility] OR 2ND FLR    SINUS SURGERY      F.E.S.S.    SINUS SURGERY FUNCTIONAL ENDOSCOPIC WITH NAVIGATION WITH MAXILLARIES, ETHMOIDS, LEFT SPHENOID, LEFT LOLY N/A 5/7/2015    Performed by Dileep Dubois III, MD at [facility] OR 2ND FLR    STENT, URETERAL placement  4/12/2019    [facility]    TUBAL LIGATION       Family History   Problem Relation Age of Onset    Diabetes Mother     Heart disease Mother     Diabetes Father      Leukemia Father         leukemia    Heart attack Father     Diabetes Sister     Diabetes Brother     Diabetes Sister     No Known Problems Sister     No Known Problems Brother     No Known Problems Brother     No Known Problems Maternal Grandmother     No Known Problems Maternal Grandfather     No Known Problems Paternal Grandmother     No Known Problems Paternal Grandfather     No Known Problems Son     No Known Problems Son     No Known Problems Maternal Aunt     No Known Problems Maternal Uncle     No Known Problems Paternal Aunt     No Known Problems Paternal Uncle     Colon cancer Neg Hx     Inflammatory bowel disease Neg Hx     Melanoma Neg Hx     Psoriasis Neg Hx     Lupus Neg Hx     Eczema Neg Hx     Acne Neg Hx     Amblyopia Neg Hx     Blindness Neg Hx     Cancer Neg Hx     Cataracts Neg Hx     Glaucoma Neg Hx     Hypertension Neg Hx     Macular degeneration Neg Hx     Retinal detachment Neg Hx     Strabismus Neg Hx     Stroke Neg Hx     Thyroid disease Neg Hx     Heart failure Neg Hx     Hyperlipidemia Neg Hx      Social History     Tobacco Use    Smoking status: Never Smoker    Smokeless tobacco: Never Used   Substance Use Topics    Alcohol use: No     Alcohol/week: 0.0 oz    Drug use: No     Review of Systems   Unable to perform ROS: Mental status change   Constitutional: Positive for fever.   HENT: Negative for sore throat.    Respiratory: Negative for shortness of breath.    Cardiovascular: Negative for chest pain.   Gastrointestinal: Negative for nausea.   Genitourinary: Negative for dysuria.   Musculoskeletal: Negative for neck pain.   Skin: Negative for rash.   Neurological: Negative for weakness.   Hematological: Does not bruise/bleed easily.       Physical Exam     Initial Vitals [04/20/19 1649]   BP Pulse Resp Temp SpO2   125/61 (!) 123 20 (!) 100.6 °F (38.1 °C) 97 %      MAP       --         Physical Exam    Nursing note and vitals  reviewed.  Constitutional: She appears well-developed and well-nourished. She is not diaphoretic. She appears ill. She appears distressed (mild).   HENT:   Head: Normocephalic and atraumatic.   Right Ear: External ear normal.   Left Ear: External ear normal.   Neck: Neck supple.   Cardiovascular: Regular rhythm, normal heart sounds and intact distal pulses. Tachycardia present.    Pulmonary/Chest: Breath sounds normal. No respiratory distress. She has no wheezes. She has no rhonchi. She has no rales.   Abdominal: Soft. She exhibits no distension. There is no rebound and no guarding.   Suprapubic region tender to palpation (resolved after correa placement)   Musculoskeletal:   Two 4 centimeter paraspinal surgical wounds with surgical staples in place. Wounds without warmth or drainage.    Neurological: She is alert and oriented to person, place, and time. GCS score is 15. GCS eye subscore is 4. GCS verbal subscore is 5. GCS motor subscore is 6.   Skin: Capillary refill takes less than 2 seconds. No rash noted.   Surgical wound on left lower quadrant of the abdomen. Surgical tape in place. No focal tenderness, warmth, or drainage.    Psychiatric: She has a normal mood and affect.         ED Course   Procedures  Labs Reviewed   CBC W/ AUTO DIFFERENTIAL - Abnormal; Notable for the following components:       Result Value    RBC 3.52 (*)     Hemoglobin 9.3 (*)     Hematocrit 29.9 (*)     MCH 26.4 (*)     MCHC 31.1 (*)     Lymph # 0.3 (*)     Mono # 0.1 (*)     Gran% 93.4 (*)     Lymph% 5.0 (*)     Mono% 1.0 (*)     All other components within normal limits   COMPREHENSIVE METABOLIC PANEL - Abnormal; Notable for the following components:    Sodium 134 (*)     Glucose 376 (*)     BUN, Bld 66 (*)     Creatinine 3.6 (*)     Albumin 2.8 (*)     Total Bilirubin 1.5 (*)     Alkaline Phosphatase 171 (*)     AST 49 (*)     eGFR if  15.3 (*)     eGFR if non  13.2 (*)     All other components within  normal limits   LACTIC ACID, PLASMA - Abnormal; Notable for the following components:    Lactate (Lactic Acid) 4.6 (*)     All other components within normal limits   URINALYSIS, REFLEX TO URINE CULTURE - Abnormal; Notable for the following components:    Appearance, UA Cloudy (*)     Protein, UA 1+ (*)     Glucose, UA 3+ (*)     Occult Blood UA 3+ (*)     Leukocytes, UA 3+ (*)     All other components within normal limits    Narrative:     Preferred Collection Type->Urine, Clean Catch   SEDIMENTATION RATE - Abnormal; Notable for the following components:    Sed Rate 99 (*)     All other components within normal limits   C-REACTIVE PROTEIN - Abnormal; Notable for the following components:    .8 (*)     All other components within normal limits   URINALYSIS MICROSCOPIC - Abnormal; Notable for the following components:    WBC, UA >100 (*)     Bacteria, UA Many (*)     All other components within normal limits    Narrative:     Preferred Collection Type->Urine, Clean Catch   POCT GLUCOSE - Abnormal; Notable for the following components:    POCT Glucose 390 (*)     All other components within normal limits   ISTAT PROCEDURE - Abnormal; Notable for the following components:    POC PH 7.452 (*)     POC PCO2 24.7 (*)     POC PO2 61 (*)     POC HCO3 17.3 (*)     POC SATURATED O2 93 (*)     POC TCO2 18 (*)     All other components within normal limits   INFLUENZA A & B BY MOLECULAR   MAGNESIUM   POCT GLUCOSE MONITORING CONTINUOUS     EKG Readings: (Independently Interpreted)   Heart Rate: 120.   Sinus tachycardia. No ST changes. Normal intervals. No ischemic changes. No STEMI.     ECG Results          EKG 12-lead (Final result)  Result time 04/22/19 00:01:32    Final result by Interface, Lab In [facility] (04/22/19 00:01:32)                 Narrative:    Test Reason : R41.82,    Vent. Rate : 120 BPM     Atrial Rate : 120 BPM     P-R Int : 140 ms          QRS Dur : 064 ms      QT Int : 340 ms       P-R-T Axes : 035 008  054 degrees     QTc Int : 480 ms    Sinus tachycardia  Otherwise normal ECG  When compared with ECG of 04-APR-2019 11:09,  No significant change was found  Confirmed by Aziza ORTEGA, Noemi ([id number]) on 4/22/2019 12:01:21 AM    Referred By: PALMIRA   SELF           Confirmed By:Noemi Ervin MD                            Imaging Results          CT Abdomen Pelvis  Without Contrast (Final result)  Result time 04/20/19 19:39:54    Final result by Chriss Holman MD (04/20/19 19:39:54)                 Impression:      1. Surgical changes from L4-L5 fusion noting punctate foci of air in the region of L5-S1 suggesting postsurgical air.  There is a larger air collection within the anterior paraspinous soft tissues, through which the left ureter courses.  This may reflect a postsurgical collection of air noting small amount of fluid layers within this collection posteriorly.  The configuration does not favor abscess secondary to lack of surrounding inflammation and lack of thickened walls however early abscess formation is a consideration.  Given that the ureter courses through this, communication of the ureter with this collection is not excluded.  There is a ureteral stent within the left ureter.  2. Air within the left renal collecting system, along the left ureter, and within the urinary bladder, correlation advised.  3. Low attenuating lesions within the left kidney, measuring higher in attenuation than would be expected for simple cysts, could reflect high protein content or hemorrhagic cysts, nonemergent ultrasound could be performed to confirm cystic nature as warranted if not previously performed.  4. Hepatomegaly, correlation with LFTs advised.  5. Please see separately dictated MRI of the lumbar spine.  6. Additional findings above.      Electronically signed by: Chriss Holman MD  Date:    04/20/2019  Time:    19:39             Narrative:    EXAMINATION:  CT ABDOMEN PELVIS WITHOUT CONTRAST    CLINICAL  HISTORY:  recent transabominal spinal surgery, sepsis, AMS;    TECHNIQUE:  Low dose axial images, sagittal and coronal reformations were obtained from the lung bases to the pubic symphysis.  Oral contrast was not administered.    COMPARISON:  MRI 04/20/2019, MRI 02/28/2019    FINDINGS:  Images of the lower thorax are remarkable for bilateral dependent atelectasis.    The liver is mildly prominent, correlation with LFTs recommended.  The liver is mildly hypoattenuating, could reflect steatosis versus artifact.  The spleen, pancreas, gallbladder and adrenal glands have a grossly unremarkable noncontrast appearance.  There is no biliary dilation.  The pancreatic duct is not dilated.  No significant abdominal lymphadenopathy.    The right kidney has a grossly unremarkable noncontrast appearance without hydronephrosis or nephrolithiasis.  The right ureter is unremarkable without definite calculi seen.  There is a low attenuating lesion arising from the interpolar region of the left kidney, attenuation of which is higher than would be expected for simple cyst, and measures 2 cm.  An additional low attenuating lesion is noted within the interpolar region measuring 2 cm, also with nonspecific attenuation.  A 3rd lesion is noted, within the lower pole measuring 1.4 cm.  There is mild-to-moderate hydronephrosis.  There is air within the left renal collecting system.  There is a double-J stent, its proximal portion is coiled within the left renal collecting system, distal portion is coiled within the urinary bladder.  There is air along the course of the left ureter.  The left ureter courses through a larger air and fluid collection, as seen on the MRI performed just prior to this exam.  The collection is heterogeneous and difficult to measure, however measures approximately 3.9 x 3.1 cm on coronal imaging.  There is a small amount of fluid within this collection.  Overall evaluation is limited secondary to metallic artifact  from adjacent spinal hardware.  There is a Fontenot catheter within the urinary bladder, the urinary bladder is decompressed.  There is air within the urinary bladder.  The uterus is absent the adnexa is otherwise grossly unremarkable.    The large bowel is grossly unremarkable.  The terminal ileum is unremarkable.  The appendix is unremarkable.  The small bowel is grossly unremarkable.  There is extensive atherosclerotic calcification of the aorta and its branches.  Scattered shotty periaortic and paracaval lymph nodes are noted.  There is a small amount of subcutaneous emphysema about the L5/sacral region, suggesting postsurgical change.  There may be a small amount of air within the right common femoral vein.    Surgical changes of L4-L5 fusion with disc spacing material noted.  Surgical staples are noted within the posterior paraspinous soft tissues.  Please see separately dictated report for details of the lumbar spine better evaluated on concurrent MRI.  No significant inguinal lymphadenopathy.  No focal organized subcutaneous fluid collection.                               MRI Lumbar Spine Without Contrast (Final result)  Result time 04/20/19 19:14:59   Procedure changed from MRI Lumbar Spine W WO Cont     Final result by Chriss Holman MD (04/20/19 19:14:59)                 Impression:      Postsurgical change of L5-S1 posterior spinal fusion and anterior interbody fusion with a 4.4 cm fluid fluid collection in the left anterior prevertebral soft tissues at the level of the L5-S1 disc space.  Findings may represent postoperative seroma or evolving hematoma however, urinoma not excluded.  No definite abscess although correlation is advised.    Irregular flow void involving the right common iliac vein, underlying thrombus not excluded.  Ultrasound recommended for further evaluation.    Circumferential disc bulge at L5-S1 impinges upon the bilateral, descending S1 nerve roots.  In comparison to examination  02/28/2019, this appears somewhat improved on the right, similar on the left.    Spondylitic changes and additional findings as above.    COMMUNICATION:  This critical result was discovered/received at 18:45.  The critical information above was relayed directly by Bishnu Bolton MD by telephone to Alice ORTEGA on 04/20/2019 at 18:50.    Electronically signed by resident: Bishnu Bolton  Date:    04/20/2019  Time:    18:29    Electronically signed by: Chriss Holman MD  Date:    04/20/2019  Time:    19:14             Narrative:    EXAMINATION:  MRI LUMBAR SPINE WITHOUT CONTRAST    CLINICAL HISTORY:  Low back pain, prior surgery, new or progressive sx;post op L spine surgery, possible Urinary retention, febrile;    TECHNIQUE:  Multiplanar, multisequence MR images were acquired through the thoracic and lumbar spine without the administration of contrast.    COMPARISON:  Lumbar spine radiograph 04/13/2019, MRI lumbar spine without contrast 02/28/2019    FINDINGS::  Alignment: Satisfactory with preservation of the normal lumbar lordosis.    Vertebrae: There is postoperative change of anterior L5-S1 interbody fusion and L5-S1 posterior spinal fusion.  Vertebral body heights are well maintained.  No marrow signal abnormality to suggest acute fracture or infiltrative marrow replacement process.    Discs: Normal height and signal, save for L5-S1 where disc spacing material limits evaluation.  There is disc space height loss at L5-S1..    Cord: Normal in caliber and signal.  Conus terminates at L1.    Degenerative findings:    T12-L1: No significant central spinal canal stenosis or neural foraminal narrowing.    L1-L2: No significant spinal canal stenosis or neural foraminal narrowing.    L2-L3: No significant spinal canal stenosis or neural foraminal narrowing.    L3-L4: Mild circumferential disc bulge, mild bilateral facet hypertrophy, and ligamentum flavum thickening result in mild bilateral neural foraminal narrowing.  No  significant central spinal canal stenosis.    L4-L5: Mild circumferential disc bulge, slightly asymmetric to the left, and bilateral facet hypertrophy result in mild bilateral neural foraminal narrowing.  No significant central spinal canal stenosis.    L5-S1: Circumferential disc bulge and bilateral facet hypertrophy results in impingement upon the descending S1 nerve roots and moderate left and mild right neuroforaminal narrowing, noting evaluation of the neural foramina is limited by metallic artifact.    Paraspinal muscles & soft tissues: Within the left anterior prevertebral soft tissues at the L5-S1 disc space there is a 4.4 x 3.0 cm collection containing fluid fluid level best appreciated on axial series 20, image 43.  The descending left ureter appears in close association to but suspected separate from this process.  Findings may represent postoperative seroma or evolving hematoma, correlation is advised.  Small urinoma is a consideration.  Abscess felt less likely given the configuration.  There is abnormal flow void involving the right common iliac vein, noting this was present on previous exams and may reflect slow flow through the vessel however thrombosis is not excluded.  The previously identified high T2 lesions within the left kidney are not well identified on the current examination.  Epidural lipomatosis noted at the sacral segments.                               X-Ray Chest AP Portable (Final result)  Result time 04/20/19 17:37:51    Final result by Chad Jewell MD (04/20/19 17:37:51)                 Impression:      Slight interstitial edema.      Electronically signed by: Chad Jewell  Date:    04/20/2019  Time:    17:37             Narrative:    EXAMINATION:  XR CHEST AP PORTABLE    CLINICAL HISTORY:  Sepsis;    TECHNIQUE:  Single frontal view of the chest was performed.    COMPARISON:  04/21/2012    FINDINGS:  Small inspiratory exam.  The heart is probably normal in size the lateral for  this technique.  There is slight interstitial edema despite the small lung volumes.  No pneumothorax or pleural effusion.  No lobar consolidation or nodular cavitary lesion.  Trachea appears normal.  No abdominal free air.  No fracture found.  There are overlying leads.                                 Medical Decision Making:   History:   Old Medical Records: I decided to obtain old medical records.  Old Records Summarized: records from clinic visits.  Independently Interpreted Test(s):   I have ordered and independently interpreted X-rays - see summary below.       <> Summary of X-Ray Reading(s): CXR viewed. No acute infiltrate, effusion or PTX on my read.   I have ordered and independently interpreted EKG Reading(s) - see prior notes  Clinical Tests:   Lab Tests: Ordered and Reviewed  Radiological Study: Ordered and Reviewed  Medical Tests: Ordered and Reviewed  Sepsis Perfusion Assessment: I attest, a sepsis perfusion exam was performed within 6 hours of Septic Shock presentation, following fluid resuscitation.  ED Management:  Febrile. Tachycardic. Here w/ AMS. Mixed picture clinically. Uncertain if this could be epidural pathology or intraabominal. Has operative complication of ureteral injury requiring emergent repair. However, she seems to be retaining urine now (unable to void, 900 mL on correa placement). Appears very ill. Will need imaging of spine as well as abdomen. CT abd pelvis as well as MR L spine ordered; septic labs ordered as well. Fluid bolus 30mL/kg orderd as well. NSGY consulted early.    Initial lactate >4. Creatinine elevated; studies changed to non-contrasted scans. Vanc/Zosyn given.  UA concerning for infection. Serum WBC normal, but ESR and CRP elevated.  Discussed with rads; MRI concerning for prevertebral fluid collection of uncertain significance, but they believe it could run near the ureter.   Urology consulted.  CT abd/pelvis with air and fluid collection in same location as on MRI.      NSGY does not think this fluid collection related to their procedure.   Urology uncertain of collection, they discussed with IR and general surgery.   No signs of shock clinically throughout ED stay.    I discussed with MICU who admitted this critically ill patient.  Other:   I have discussed this case with another health care provider.       <> Summary of the Discussion: Neurosurgery, urology, and MICU.            Scribe Attestation:   Scribe #1: I performed the above scribed service and the documentation accurately describes the services I performed. I attest to the accuracy of the note.    Attending Attestation:         Attending Critical Care:   Critical Care Times:   ==============================================================  · Total Critical Care Time - exclusive of procedural time: 75 minutes.  ==============================================================  Critical care was necessary to treat or prevent imminent or life-threatening deterioration of the following conditions: sepsis.   Critical care was time spent personally by me on the following activities: obtaining history from patient or relative, examination of patient, review of old charts, ordering lab, x-rays, and/or EKG, development of treatment plan with patient or relative, ordering and performing treatments and interventions, evaluation of patient's response to treatment, discussion with consultants, interpretation of cardiac measurements and re-evaluation of patient's conition.   Critical Care Condition: life-threatening                  Clinical Impression:       ICD-10-CM ICD-9-CM   1. Sepsis, due to unspecified organism A41.9 038.9     995.91   2. Altered mental status, unspecified altered mental status type R41.82 780.97   3. Transection of ureter of left native kidney, sequela S37.10XS 908.2   4. Type 2 diabetes mellitus with diabetic peripheral angiopathy without gangrene, unspecified whether long term insulin use E11.51 250.70      443.81   5. Encounter for weaning from ventilator Z99.11 V46.13   6. Metabolic acidosis E87.2 276.2   7. Sepsis A41.9 038.9     995.91   8. Acidosis E87.2 276.2   9. PAPA (acute kidney injury) N17.9 584.9   10. Anemia, unspecified type D64.9 285.9   11. Urinary tract infection without hematuria, site unspecified N39.0 599.0   12. Infection of organ or organ space after surgery, initial encounter T81.43XA 998.59   13. Lactic acidosis E87.2 276.2         Disposition:   Disposition: Admitted  Condition: Serious                        Nadir Jenkins MD  04/22/19 1633

## 2019-04-21 ENCOUNTER — ANESTHESIA EVENT (OUTPATIENT)
Dept: ENDOSCOPY | Facility: HOSPITAL | Age: 59
DRG: 003 | End: 2019-04-21
Payer: MEDICARE

## 2019-04-21 ENCOUNTER — ANESTHESIA (OUTPATIENT)
Dept: ENDOSCOPY | Facility: HOSPITAL | Age: 59
DRG: 003 | End: 2019-04-21
Payer: MEDICARE

## 2019-04-21 PROBLEM — Z91.89 AT RISK FOR FLUID VOLUME OVERLOAD: Status: ACTIVE | Noted: 2019-04-21

## 2019-04-21 LAB
ABO + RH BLD: NORMAL
ALBUMIN SERPL BCP-MCNC: 1.4 G/DL (ref 3.5–5.2)
ALBUMIN SERPL BCP-MCNC: 1.7 G/DL (ref 3.5–5.2)
ALLENS TEST: ABNORMAL
ALP SERPL-CCNC: 102 U/L (ref 55–135)
ALP SERPL-CCNC: 120 U/L (ref 55–135)
ALT SERPL W/O P-5'-P-CCNC: 233 U/L (ref 10–44)
ALT SERPL W/O P-5'-P-CCNC: 446 U/L (ref 10–44)
ANION GAP SERPL CALC-SCNC: 16 MMOL/L (ref 8–16)
ANION GAP SERPL CALC-SCNC: 17 MMOL/L (ref 8–16)
ANION GAP SERPL CALC-SCNC: 22 MMOL/L (ref 8–16)
ANISOCYTOSIS BLD QL SMEAR: SLIGHT
AST SERPL-CCNC: 1697 U/L (ref 10–40)
AST SERPL-CCNC: 532 U/L (ref 10–40)
BACTERIA #/AREA URNS AUTO: ABNORMAL /HPF
BASOPHILS # BLD AUTO: 0.02 K/UL (ref 0–0.2)
BASOPHILS # BLD AUTO: 0.07 K/UL (ref 0–0.2)
BASOPHILS NFR BLD: 0.2 % (ref 0–1.9)
BASOPHILS NFR BLD: 0.8 % (ref 0–1.9)
BASOPHILS NFR BLD: 1 % (ref 0–1.9)
BILIRUB DIRECT SERPL-MCNC: 3.5 MG/DL (ref 0.1–0.3)
BILIRUB SERPL-MCNC: 4.7 MG/DL (ref 0.1–1)
BILIRUB SERPL-MCNC: 4.8 MG/DL (ref 0.1–1)
BILIRUB UR QL STRIP: NEGATIVE
BLD GP AB SCN CELLS X3 SERPL QL: NORMAL
BLD PROD TYP BPU: NORMAL
BLD PROD TYP BPU: NORMAL
BLOOD UNIT EXPIRATION DATE: NORMAL
BLOOD UNIT EXPIRATION DATE: NORMAL
BLOOD UNIT TYPE CODE: 5100
BLOOD UNIT TYPE CODE: 5100
BLOOD UNIT TYPE: NORMAL
BLOOD UNIT TYPE: NORMAL
BUN SERPL-MCNC: 44 MG/DL (ref 6–20)
BUN SERPL-MCNC: 52 MG/DL (ref 6–20)
BUN SERPL-MCNC: 60 MG/DL (ref 6–20)
BURR CELLS BLD QL SMEAR: ABNORMAL
BURR CELLS BLD QL SMEAR: ABNORMAL
CALCIUM SERPL-MCNC: 7.1 MG/DL (ref 8.7–10.5)
CALCIUM SERPL-MCNC: 8.5 MG/DL (ref 8.7–10.5)
CALCIUM SERPL-MCNC: 8.6 MG/DL (ref 8.7–10.5)
CHLORIDE SERPL-SCNC: 103 MMOL/L (ref 95–110)
CHLORIDE SERPL-SCNC: 109 MMOL/L (ref 95–110)
CHLORIDE SERPL-SCNC: 115 MMOL/L (ref 95–110)
CK SERPL-CCNC: 460 U/L (ref 20–180)
CLARITY UR REFRACT.AUTO: ABNORMAL
CO2 SERPL-SCNC: 11 MMOL/L (ref 23–29)
CO2 SERPL-SCNC: 13 MMOL/L (ref 23–29)
CO2 SERPL-SCNC: 17 MMOL/L (ref 23–29)
CODING SYSTEM: NORMAL
CODING SYSTEM: NORMAL
COLOR UR AUTO: ABNORMAL
CREAT SERPL-MCNC: 1.9 MG/DL (ref 0.5–1.4)
CREAT SERPL-MCNC: 2.6 MG/DL (ref 0.5–1.4)
CREAT SERPL-MCNC: 3.4 MG/DL (ref 0.5–1.4)
CREAT UR-MCNC: 52 MG/DL (ref 15–325)
DACRYOCYTES BLD QL SMEAR: ABNORMAL
DACRYOCYTES BLD QL SMEAR: ABNORMAL
DELSYS: ABNORMAL
DIFFERENTIAL METHOD: ABNORMAL
DISPENSE STATUS: NORMAL
DISPENSE STATUS: NORMAL
DOHLE BOD BLD QL SMEAR: PRESENT
DOHLE BOD BLD QL SMEAR: PRESENT
EOSINOPHIL # BLD AUTO: 0.1 K/UL (ref 0–0.5)
EOSINOPHIL # BLD AUTO: 0.1 K/UL (ref 0–0.5)
EOSINOPHIL NFR BLD: 0.8 % (ref 0–8)
EOSINOPHIL NFR BLD: 0.9 % (ref 0–8)
EOSINOPHIL NFR BLD: 4 % (ref 0–8)
ERYTHROCYTE [DISTWIDTH] IN BLOOD BY AUTOMATED COUNT: 13 % (ref 11.5–14.5)
ERYTHROCYTE [DISTWIDTH] IN BLOOD BY AUTOMATED COUNT: 13.3 % (ref 11.5–14.5)
ERYTHROCYTE [DISTWIDTH] IN BLOOD BY AUTOMATED COUNT: 13.6 % (ref 11.5–14.5)
ERYTHROCYTE [SEDIMENTATION RATE] IN BLOOD BY WESTERGREN METHOD: 18 MM/H
ERYTHROCYTE [SEDIMENTATION RATE] IN BLOOD BY WESTERGREN METHOD: 18 MM/H
ERYTHROCYTE [SEDIMENTATION RATE] IN BLOOD BY WESTERGREN METHOD: 20 MM/H
EST. GFR  (AFRICAN AMERICAN): 16.3 ML/MIN/1.73 M^2
EST. GFR  (AFRICAN AMERICAN): 22.6 ML/MIN/1.73 M^2
EST. GFR  (AFRICAN AMERICAN): 33 ML/MIN/1.73 M^2
EST. GFR  (NON AFRICAN AMERICAN): 14.2 ML/MIN/1.73 M^2
EST. GFR  (NON AFRICAN AMERICAN): 19.6 ML/MIN/1.73 M^2
EST. GFR  (NON AFRICAN AMERICAN): 28.7 ML/MIN/1.73 M^2
FIO2: 50
FIO2: 60
GLUCOSE SERPL-MCNC: 160 MG/DL (ref 70–110)
GLUCOSE SERPL-MCNC: 240 MG/DL (ref 70–110)
GLUCOSE SERPL-MCNC: 250 MG/DL (ref 70–110)
GLUCOSE SERPL-MCNC: 252 MG/DL (ref 70–110)
GLUCOSE SERPL-MCNC: 258 MG/DL (ref 70–110)
GLUCOSE SERPL-MCNC: 283 MG/DL (ref 70–110)
GLUCOSE UR QL STRIP: ABNORMAL
GRAM STN SPEC: NORMAL
GRAM STN SPEC: NORMAL
GRAN CASTS UR QL COMP ASSIST: 2 /LPF
HCO3 UR-SCNC: 11.7 MMOL/L (ref 24–28)
HCO3 UR-SCNC: 13.1 MMOL/L (ref 24–28)
HCO3 UR-SCNC: 14.6 MMOL/L (ref 24–28)
HCO3 UR-SCNC: 14.8 MMOL/L (ref 24–28)
HCO3 UR-SCNC: 14.9 MMOL/L (ref 24–28)
HCO3 UR-SCNC: 16.1 MMOL/L (ref 24–28)
HCO3 UR-SCNC: 16.7 MMOL/L (ref 24–28)
HCO3 UR-SCNC: 16.9 MMOL/L (ref 24–28)
HCO3 UR-SCNC: 17.5 MMOL/L (ref 24–28)
HCT VFR BLD AUTO: 20.7 % (ref 37–48.5)
HCT VFR BLD AUTO: 27.5 % (ref 37–48.5)
HCT VFR BLD AUTO: 29.8 % (ref 37–48.5)
HCT VFR BLD CALC: 16 %PCV (ref 36–54)
HCT VFR BLD CALC: 20 %PCV (ref 36–54)
HCT VFR BLD CALC: 23 %PCV (ref 36–54)
HCT VFR BLD CALC: 26 %PCV (ref 36–54)
HGB BLD-MCNC: 6.3 G/DL (ref 12–16)
HGB BLD-MCNC: 8.7 G/DL (ref 12–16)
HGB BLD-MCNC: 9.3 G/DL (ref 12–16)
HGB UR QL STRIP: ABNORMAL
HYALINE CASTS UR QL AUTO: 3 /LPF
IMM GRANULOCYTES # BLD AUTO: 0.06 K/UL (ref 0–0.04)
IMM GRANULOCYTES # BLD AUTO: 0.42 K/UL (ref 0–0.04)
IMM GRANULOCYTES # BLD AUTO: ABNORMAL K/UL (ref 0–0.04)
IMM GRANULOCYTES NFR BLD AUTO: 0.7 % (ref 0–0.5)
IMM GRANULOCYTES NFR BLD AUTO: 4.7 % (ref 0–0.5)
IMM GRANULOCYTES NFR BLD AUTO: ABNORMAL % (ref 0–0.5)
KETONES UR QL STRIP: NEGATIVE
LACTATE SERPL-SCNC: 10.6 MMOL/L (ref 0.5–2.2)
LACTATE SERPL-SCNC: 6.6 MMOL/L (ref 0.5–2.2)
LACTATE SERPL-SCNC: 9.2 MMOL/L (ref 0.5–2.2)
LDH SERPL L TO P-CCNC: 6.78 MMOL/L (ref 0.36–1.25)
LDH SERPL L TO P-CCNC: 6.83 MMOL/L (ref 0.36–1.25)
LDH SERPL L TO P-CCNC: 7.63 MMOL/L (ref 0.36–1.25)
LDH SERPL L TO P-CCNC: 9.09 MMOL/L (ref 0.36–1.25)
LEUKOCYTE ESTERASE UR QL STRIP: ABNORMAL
LYMPHOCYTES # BLD AUTO: 0.6 K/UL (ref 1–4.8)
LYMPHOCYTES # BLD AUTO: 1.2 K/UL (ref 1–4.8)
LYMPHOCYTES NFR BLD: 12.9 % (ref 18–48)
LYMPHOCYTES NFR BLD: 3 % (ref 18–48)
LYMPHOCYTES NFR BLD: 7 % (ref 18–48)
MAGNESIUM SERPL-MCNC: 1.7 MG/DL (ref 1.6–2.6)
MAGNESIUM SERPL-MCNC: 2.1 MG/DL (ref 1.6–2.6)
MCH RBC QN AUTO: 26 PG (ref 27–31)
MCH RBC QN AUTO: 28.1 PG (ref 27–31)
MCH RBC QN AUTO: 28.2 PG (ref 27–31)
MCHC RBC AUTO-ENTMCNC: 30.4 G/DL (ref 32–36)
MCHC RBC AUTO-ENTMCNC: 31.2 G/DL (ref 32–36)
MCHC RBC AUTO-ENTMCNC: 31.6 G/DL (ref 32–36)
MCV RBC AUTO: 86 FL (ref 82–98)
MCV RBC AUTO: 89 FL (ref 82–98)
MCV RBC AUTO: 90 FL (ref 82–98)
METAMYELOCYTES NFR BLD MANUAL: 3 %
MICROSCOPIC COMMENT: ABNORMAL
MODE: ABNORMAL
MONOCYTES # BLD AUTO: 0.3 K/UL (ref 0.3–1)
MONOCYTES # BLD AUTO: 0.4 K/UL (ref 0.3–1)
MONOCYTES NFR BLD: 1 % (ref 4–15)
MONOCYTES NFR BLD: 3.4 % (ref 4–15)
MONOCYTES NFR BLD: 4.6 % (ref 4–15)
NEUTROPHILS # BLD AUTO: 6.8 K/UL (ref 1.8–7.7)
NEUTROPHILS # BLD AUTO: 7.3 K/UL (ref 1.8–7.7)
NEUTROPHILS NFR BLD: 69 % (ref 38–73)
NEUTROPHILS NFR BLD: 76.1 % (ref 38–73)
NEUTROPHILS NFR BLD: 87.9 % (ref 38–73)
NEUTS BAND NFR BLD MANUAL: 19 %
NITRITE UR QL STRIP: NEGATIVE
NON-SQ EPI CELLS #/AREA URNS AUTO: <1 /HPF
NRBC BLD-RTO: 0 /100 WBC
OVALOCYTES BLD QL SMEAR: ABNORMAL
OVALOCYTES BLD QL SMEAR: ABNORMAL
PCO2 BLDA: 20.9 MMHG (ref 35–45)
PCO2 BLDA: 22.1 MMHG (ref 35–45)
PCO2 BLDA: 25.8 MMHG (ref 35–45)
PCO2 BLDA: 28 MMHG (ref 35–45)
PCO2 BLDA: 29.5 MMHG (ref 35–45)
PCO2 BLDA: 31.8 MMHG (ref 35–45)
PCO2 BLDA: 33.1 MMHG (ref 35–45)
PCO2 BLDA: 40.5 MMHG (ref 35–45)
PCO2 BLDA: 42.2 MMHG (ref 35–45)
PEEP: 5
PH SMN: 7.23 [PH] (ref 7.35–7.45)
PH SMN: 7.23 [PH] (ref 7.35–7.45)
PH SMN: 7.3 [PH] (ref 7.35–7.45)
PH SMN: 7.31 [PH] (ref 7.35–7.45)
PH SMN: 7.33 [PH] (ref 7.35–7.45)
PH SMN: 7.33 [PH] (ref 7.35–7.45)
PH SMN: 7.36 [PH] (ref 7.35–7.45)
PH SMN: 7.36 [PH] (ref 7.35–7.45)
PH SMN: 7.38 [PH] (ref 7.35–7.45)
PH UR STRIP: 5 [PH] (ref 5–8)
PHOSPHATE SERPL-MCNC: 4.4 MG/DL (ref 2.7–4.5)
PHOSPHATE SERPL-MCNC: 5 MG/DL (ref 2.7–4.5)
PLATELET # BLD AUTO: 124 K/UL (ref 150–350)
PLATELET # BLD AUTO: 146 K/UL (ref 150–350)
PLATELET # BLD AUTO: 86 K/UL (ref 150–350)
PLATELET BLD QL SMEAR: ABNORMAL
PMV BLD AUTO: 10.8 FL (ref 9.2–12.9)
PMV BLD AUTO: 11.2 FL (ref 9.2–12.9)
PMV BLD AUTO: 11.9 FL (ref 9.2–12.9)
PO2 BLDA: 103 MMHG (ref 80–100)
PO2 BLDA: 179 MMHG (ref 80–100)
PO2 BLDA: 188 MMHG (ref 80–100)
PO2 BLDA: 231 MMHG (ref 80–100)
PO2 BLDA: 76 MMHG (ref 80–100)
PO2 BLDA: 81 MMHG (ref 80–100)
PO2 BLDA: 98 MMHG (ref 80–100)
POC BE: -10 MMOL/L
POC BE: -10 MMOL/L
POC BE: -11 MMOL/L
POC BE: -12 MMOL/L
POC BE: -14 MMOL/L
POC BE: -9 MMOL/L
POC IONIZED CALCIUM: 0.88 MMOL/L (ref 1.06–1.42)
POC IONIZED CALCIUM: 0.99 MMOL/L (ref 1.06–1.42)
POC IONIZED CALCIUM: 1 MMOL/L (ref 1.06–1.42)
POC IONIZED CALCIUM: 1.01 MMOL/L (ref 1.06–1.42)
POC SATURATED O2: 100 % (ref 95–100)
POC SATURATED O2: 100 % (ref 95–100)
POC SATURATED O2: 95 % (ref 95–100)
POC SATURATED O2: 97 % (ref 95–100)
POC SATURATED O2: 97 % (ref 95–100)
POC SATURATED O2: 99 % (ref 95–100)
POC TCO2: 12 MMOL/L (ref 23–27)
POC TCO2: 14 MMOL/L (ref 23–27)
POC TCO2: 15 MMOL/L (ref 23–27)
POC TCO2: 16 MMOL/L (ref 23–27)
POC TCO2: 16 MMOL/L (ref 23–27)
POC TCO2: 17 MMOL/L (ref 23–27)
POC TCO2: 18 MMOL/L (ref 23–27)
POC TCO2: 18 MMOL/L (ref 23–27)
POC TCO2: 19 MMOL/L (ref 23–27)
POCT GLUCOSE: 115 MG/DL (ref 70–110)
POCT GLUCOSE: 148 MG/DL (ref 70–110)
POCT GLUCOSE: 149 MG/DL (ref 70–110)
POCT GLUCOSE: 154 MG/DL (ref 70–110)
POCT GLUCOSE: 157 MG/DL (ref 70–110)
POCT GLUCOSE: 158 MG/DL (ref 70–110)
POCT GLUCOSE: 160 MG/DL (ref 70–110)
POCT GLUCOSE: 168 MG/DL (ref 70–110)
POCT GLUCOSE: 170 MG/DL (ref 70–110)
POCT GLUCOSE: 170 MG/DL (ref 70–110)
POCT GLUCOSE: 174 MG/DL (ref 70–110)
POCT GLUCOSE: 209 MG/DL (ref 70–110)
POCT GLUCOSE: 221 MG/DL (ref 70–110)
POCT GLUCOSE: 222 MG/DL (ref 70–110)
POCT GLUCOSE: 228 MG/DL (ref 70–110)
POCT GLUCOSE: 229 MG/DL (ref 70–110)
POCT GLUCOSE: 232 MG/DL (ref 70–110)
POCT GLUCOSE: 244 MG/DL (ref 70–110)
POCT GLUCOSE: 248 MG/DL (ref 70–110)
POCT GLUCOSE: 265 MG/DL (ref 70–110)
POIKILOCYTOSIS BLD QL SMEAR: SLIGHT
POLYCHROMASIA BLD QL SMEAR: ABNORMAL
POLYCHROMASIA BLD QL SMEAR: ABNORMAL
POTASSIUM BLD-SCNC: 3.8 MMOL/L (ref 3.5–5.1)
POTASSIUM BLD-SCNC: 4 MMOL/L (ref 3.5–5.1)
POTASSIUM BLD-SCNC: 4.3 MMOL/L (ref 3.5–5.1)
POTASSIUM BLD-SCNC: 5 MMOL/L (ref 3.5–5.1)
POTASSIUM SERPL-SCNC: 4.1 MMOL/L (ref 3.5–5.1)
POTASSIUM SERPL-SCNC: 4.6 MMOL/L (ref 3.5–5.1)
POTASSIUM SERPL-SCNC: 4.8 MMOL/L (ref 3.5–5.1)
PROT SERPL-MCNC: 4.3 G/DL (ref 6–8.4)
PROT SERPL-MCNC: 5.2 G/DL (ref 6–8.4)
PROT UR QL STRIP: ABNORMAL
PROT UR-MCNC: 95 MG/DL (ref 0–15)
PROT/CREAT UR: 1.83 MG/G{CREAT} (ref 0–0.2)
PS: 10
RBC # BLD AUTO: 2.42 M/UL (ref 4–5.4)
RBC # BLD AUTO: 3.1 M/UL (ref 4–5.4)
RBC # BLD AUTO: 3.3 M/UL (ref 4–5.4)
RBC #/AREA URNS AUTO: 14 /HPF (ref 0–4)
SAMPLE: ABNORMAL
SITE: ABNORMAL
SODIUM BLD-SCNC: 137 MMOL/L (ref 136–145)
SODIUM BLD-SCNC: 139 MMOL/L (ref 136–145)
SODIUM BLD-SCNC: 141 MMOL/L (ref 136–145)
SODIUM BLD-SCNC: 142 MMOL/L (ref 136–145)
SODIUM SERPL-SCNC: 139 MMOL/L (ref 136–145)
SODIUM SERPL-SCNC: 142 MMOL/L (ref 136–145)
SODIUM SERPL-SCNC: 142 MMOL/L (ref 136–145)
SODIUM UR-SCNC: 76 MMOL/L (ref 20–250)
SP GR UR STRIP: 1.02 (ref 1–1.03)
SP02: 100
SP02: 97
SP02: 99
SQUAMOUS #/AREA URNS AUTO: 3 /HPF
TOXIC GRANULES BLD QL SMEAR: PRESENT
TOXIC GRANULES BLD QL SMEAR: PRESENT
TRANS ERYTHROCYTES VOL PATIENT: NORMAL ML
TRANS ERYTHROCYTES VOL PATIENT: NORMAL ML
URN SPEC COLLECT METH UR: ABNORMAL
VANCOMYCIN SERPL-MCNC: 24.3 UG/ML
VT: 450
WBC # BLD AUTO: 12.47 K/UL (ref 3.9–12.7)
WBC # BLD AUTO: 8.31 K/UL (ref 3.9–12.7)
WBC # BLD AUTO: 8.98 K/UL (ref 3.9–12.7)
WBC #/AREA URNS AUTO: 51 /HPF (ref 0–5)
WBC CLUMPS UR QL AUTO: ABNORMAL
YEAST UR QL AUTO: ABNORMAL

## 2019-04-21 PROCEDURE — 25000003 PHARM REV CODE 250: Performed by: STUDENT IN AN ORGANIZED HEALTH CARE EDUCATION/TRAINING PROGRAM

## 2019-04-21 PROCEDURE — 87070 CULTURE OTHR SPECIMN AEROBIC: CPT

## 2019-04-21 PROCEDURE — 25000003 PHARM REV CODE 250: Performed by: NURSE ANESTHETIST, CERTIFIED REGISTERED

## 2019-04-21 PROCEDURE — 63600175 PHARM REV CODE 636 W HCPCS: Performed by: STUDENT IN AN ORGANIZED HEALTH CARE EDUCATION/TRAINING PROGRAM

## 2019-04-21 PROCEDURE — 84300 ASSAY OF URINE SODIUM: CPT

## 2019-04-21 PROCEDURE — 84156 ASSAY OF PROTEIN URINE: CPT

## 2019-04-21 PROCEDURE — 87186 SC STD MICRODIL/AGAR DIL: CPT

## 2019-04-21 PROCEDURE — 82803 BLOOD GASES ANY COMBINATION: CPT

## 2019-04-21 PROCEDURE — 37000009 HC ANESTHESIA EA ADD 15 MINS: Performed by: UROLOGY

## 2019-04-21 PROCEDURE — 83735 ASSAY OF MAGNESIUM: CPT

## 2019-04-21 PROCEDURE — D9220A PRA ANESTHESIA: Mod: 59,CRNA,, | Performed by: NURSE ANESTHETIST, CERTIFIED REGISTERED

## 2019-04-21 PROCEDURE — 99900026 HC AIRWAY MAINTENANCE (STAT)

## 2019-04-21 PROCEDURE — 36620 INSERTION CATHETER ARTERY: CPT | Mod: 59,,, | Performed by: ANESTHESIOLOGY

## 2019-04-21 PROCEDURE — 87040 BLOOD CULTURE FOR BACTERIA: CPT

## 2019-04-21 PROCEDURE — 63600175 PHARM REV CODE 636 W HCPCS: Performed by: NURSE ANESTHETIST, CERTIFIED REGISTERED

## 2019-04-21 PROCEDURE — 36620 ARTERIAL: ICD-10-PCS | Mod: 59,,, | Performed by: ANESTHESIOLOGY

## 2019-04-21 PROCEDURE — 85025 COMPLETE CBC W/AUTO DIFF WBC: CPT | Mod: 91

## 2019-04-21 PROCEDURE — 85014 HEMATOCRIT: CPT

## 2019-04-21 PROCEDURE — 83605 ASSAY OF LACTIC ACID: CPT

## 2019-04-21 PROCEDURE — 99222 PR INITIAL HOSPITAL CARE,LEVL II: ICD-10-PCS | Mod: 24,,, | Performed by: NEUROLOGICAL SURGERY

## 2019-04-21 PROCEDURE — D9220A PRA ANESTHESIA: Mod: CRNA,,, | Performed by: NURSE ANESTHETIST, CERTIFIED REGISTERED

## 2019-04-21 PROCEDURE — 87075 CULTR BACTERIA EXCEPT BLOOD: CPT

## 2019-04-21 PROCEDURE — 84132 ASSAY OF SERUM POTASSIUM: CPT

## 2019-04-21 PROCEDURE — 27200966 HC CLOSED SUCTION SYSTEM

## 2019-04-21 PROCEDURE — 20000000 HC ICU ROOM

## 2019-04-21 PROCEDURE — 76937 PR  US GUIDE, VASCULAR ACCESS: ICD-10-PCS | Mod: 26,,, | Performed by: ANESTHESIOLOGY

## 2019-04-21 PROCEDURE — 84100 ASSAY OF PHOSPHORUS: CPT

## 2019-04-21 PROCEDURE — P9021 RED BLOOD CELLS UNIT: HCPCS

## 2019-04-21 PROCEDURE — 82330 ASSAY OF CALCIUM: CPT

## 2019-04-21 PROCEDURE — 27000221 HC OXYGEN, UP TO 24 HOURS

## 2019-04-21 PROCEDURE — 25000003 PHARM REV CODE 250: Performed by: SURGERY

## 2019-04-21 PROCEDURE — 99222 1ST HOSP IP/OBS MODERATE 55: CPT | Mod: 24,,, | Performed by: NEUROLOGICAL SURGERY

## 2019-04-21 PROCEDURE — 85007 BL SMEAR W/DIFF WBC COUNT: CPT

## 2019-04-21 PROCEDURE — 86850 RBC ANTIBODY SCREEN: CPT

## 2019-04-21 PROCEDURE — 36556 PR INSERT NON-TUNNEL CV CATH 5+ YRS OLD: ICD-10-PCS | Mod: 59,,, | Performed by: ANESTHESIOLOGY

## 2019-04-21 PROCEDURE — 80048 BASIC METABOLIC PNL TOTAL CA: CPT

## 2019-04-21 PROCEDURE — D9220A PRA ANESTHESIA: Mod: 59,ANES,, | Performed by: ANESTHESIOLOGY

## 2019-04-21 PROCEDURE — 36556 INSERT NON-TUNNEL CV CATH: CPT | Mod: 59,,, | Performed by: ANESTHESIOLOGY

## 2019-04-21 PROCEDURE — 81001 URINALYSIS AUTO W/SCOPE: CPT

## 2019-04-21 PROCEDURE — 82550 ASSAY OF CK (CPK): CPT

## 2019-04-21 PROCEDURE — 37000008 HC ANESTHESIA 1ST 15 MINUTES

## 2019-04-21 PROCEDURE — 27800505 HC CATH, RADIAL ARTERY KIT: Performed by: NURSE ANESTHETIST, CERTIFIED REGISTERED

## 2019-04-21 PROCEDURE — 25500020 PHARM REV CODE 255: Performed by: SURGERY

## 2019-04-21 PROCEDURE — 87086 URINE CULTURE/COLONY COUNT: CPT

## 2019-04-21 PROCEDURE — 99900035 HC TECH TIME PER 15 MIN (STAT)

## 2019-04-21 PROCEDURE — 80076 HEPATIC FUNCTION PANEL: CPT

## 2019-04-21 PROCEDURE — D9220A PRA ANESTHESIA: Mod: ANES,,, | Performed by: ANESTHESIOLOGY

## 2019-04-21 PROCEDURE — 94761 N-INVAS EAR/PLS OXIMETRY MLT: CPT

## 2019-04-21 PROCEDURE — C1751 CATH, INF, PER/CENT/MIDLINE: HCPCS | Performed by: NURSE ANESTHETIST, CERTIFIED REGISTERED

## 2019-04-21 PROCEDURE — 87077 CULTURE AEROBIC IDENTIFY: CPT | Mod: 59

## 2019-04-21 PROCEDURE — 36415 COLL VENOUS BLD VENIPUNCTURE: CPT

## 2019-04-21 PROCEDURE — 36000707: Performed by: UROLOGY

## 2019-04-21 PROCEDURE — 25000003 PHARM REV CODE 250

## 2019-04-21 PROCEDURE — D9220A PRA ANESTHESIA: ICD-10-PCS | Mod: ANES,,, | Performed by: ANESTHESIOLOGY

## 2019-04-21 PROCEDURE — 83735 ASSAY OF MAGNESIUM: CPT | Mod: 91

## 2019-04-21 PROCEDURE — 94002 VENT MGMT INPAT INIT DAY: CPT

## 2019-04-21 PROCEDURE — 80053 COMPREHEN METABOLIC PANEL: CPT

## 2019-04-21 PROCEDURE — 76937 US GUIDE VASCULAR ACCESS: CPT | Mod: 26,,, | Performed by: ANESTHESIOLOGY

## 2019-04-21 PROCEDURE — 87086 URINE CULTURE/COLONY COUNT: CPT | Mod: 59

## 2019-04-21 PROCEDURE — 37000008 HC ANESTHESIA 1ST 15 MINUTES: Performed by: UROLOGY

## 2019-04-21 PROCEDURE — 80202 ASSAY OF VANCOMYCIN: CPT

## 2019-04-21 PROCEDURE — D9220A PRA ANESTHESIA: ICD-10-PCS | Mod: 59,CRNA,, | Performed by: NURSE ANESTHETIST, CERTIFIED REGISTERED

## 2019-04-21 PROCEDURE — 83605 ASSAY OF LACTIC ACID: CPT | Mod: 91

## 2019-04-21 PROCEDURE — D9220A PRA ANESTHESIA: ICD-10-PCS | Mod: CRNA,,, | Performed by: NURSE ANESTHETIST, CERTIFIED REGISTERED

## 2019-04-21 PROCEDURE — 84295 ASSAY OF SERUM SODIUM: CPT

## 2019-04-21 PROCEDURE — 80048 BASIC METABOLIC PNL TOTAL CA: CPT | Mod: 91

## 2019-04-21 PROCEDURE — 84100 ASSAY OF PHOSPHORUS: CPT | Mod: 91

## 2019-04-21 PROCEDURE — 85027 COMPLETE CBC AUTOMATED: CPT

## 2019-04-21 PROCEDURE — 63600175 PHARM REV CODE 636 W HCPCS: Performed by: SURGERY

## 2019-04-21 PROCEDURE — 37799 UNLISTED PX VASCULAR SURGERY: CPT

## 2019-04-21 PROCEDURE — 37000009 HC ANESTHESIA EA ADD 15 MINS

## 2019-04-21 PROCEDURE — 25000003 PHARM REV CODE 250: Performed by: ANESTHESIOLOGY

## 2019-04-21 PROCEDURE — 86920 COMPATIBILITY TEST SPIN: CPT

## 2019-04-21 PROCEDURE — 36000706: Performed by: UROLOGY

## 2019-04-21 PROCEDURE — D9220A PRA ANESTHESIA: ICD-10-PCS | Mod: 59,ANES,, | Performed by: ANESTHESIOLOGY

## 2019-04-21 RX ORDER — ROCURONIUM BROMIDE 10 MG/ML
INJECTION, SOLUTION INTRAVENOUS
Status: DISCONTINUED | OUTPATIENT
Start: 2019-04-21 | End: 2019-04-21

## 2019-04-21 RX ORDER — ONDANSETRON 2 MG/ML
INJECTION INTRAMUSCULAR; INTRAVENOUS
Status: DISCONTINUED | OUTPATIENT
Start: 2019-04-21 | End: 2019-04-21

## 2019-04-21 RX ORDER — INDOMETHACIN 25 MG/1
50 CAPSULE ORAL ONCE
Status: COMPLETED | OUTPATIENT
Start: 2019-04-21 | End: 2019-04-21

## 2019-04-21 RX ORDER — NOREPINEPHRINE BITARTRATE/D5W 4MG/250ML
0.02 PLASTIC BAG, INJECTION (ML) INTRAVENOUS CONTINUOUS
Status: DISCONTINUED | OUTPATIENT
Start: 2019-04-21 | End: 2019-04-21

## 2019-04-21 RX ORDER — FENTANYL CITRATE-0.9 % NACL/PF 10 MCG/ML
PLASTIC BAG, INJECTION (ML) INTRAVENOUS CONTINUOUS
Status: DISCONTINUED | OUTPATIENT
Start: 2019-04-21 | End: 2019-04-21

## 2019-04-21 RX ORDER — MIDAZOLAM HYDROCHLORIDE 1 MG/ML
INJECTION, SOLUTION INTRAMUSCULAR; INTRAVENOUS
Status: DISCONTINUED | OUTPATIENT
Start: 2019-04-21 | End: 2019-04-21

## 2019-04-21 RX ORDER — LIDOCAINE HCL/PF 100 MG/5ML
SYRINGE (ML) INTRAVENOUS
Status: DISCONTINUED | OUTPATIENT
Start: 2019-04-21 | End: 2019-04-21

## 2019-04-21 RX ORDER — FENTANYL CITRATE 50 UG/ML
INJECTION, SOLUTION INTRAMUSCULAR; INTRAVENOUS
Status: DISCONTINUED | OUTPATIENT
Start: 2019-04-21 | End: 2019-04-21

## 2019-04-21 RX ORDER — CALCIUM CHLORIDE INJECTION 100 MG/ML
1 INJECTION, SOLUTION INTRAVENOUS ONCE
Status: DISCONTINUED | OUTPATIENT
Start: 2019-04-21 | End: 2019-04-21

## 2019-04-21 RX ORDER — NOREPINEPHRINE BITARTRATE/D5W 4MG/250ML
0.02 PLASTIC BAG, INJECTION (ML) INTRAVENOUS CONTINUOUS
Status: DISCONTINUED | OUTPATIENT
Start: 2019-04-21 | End: 2019-04-24

## 2019-04-21 RX ORDER — FENTANYL CITRATE 50 UG/ML
50 INJECTION, SOLUTION INTRAMUSCULAR; INTRAVENOUS
Status: DISPENSED | OUTPATIENT
Start: 2019-04-21 | End: 2019-04-23

## 2019-04-21 RX ORDER — EPINEPHRINE 0.1 MG/ML
INJECTION INTRAVENOUS
Status: DISCONTINUED | OUTPATIENT
Start: 2019-04-21 | End: 2019-04-21

## 2019-04-21 RX ORDER — PHENYLEPHRINE HYDROCHLORIDE 10 MG/ML
INJECTION INTRAVENOUS
Status: DISCONTINUED | OUTPATIENT
Start: 2019-04-21 | End: 2019-04-21

## 2019-04-21 RX ORDER — SODIUM BICARBONATE 1 MEQ/ML
SYRINGE (ML) INTRAVENOUS
Status: DISCONTINUED | OUTPATIENT
Start: 2019-04-21 | End: 2019-04-21

## 2019-04-21 RX ORDER — PROPOFOL 10 MG/ML
10 INJECTION, EMULSION INTRAVENOUS CONTINUOUS
Status: DISCONTINUED | OUTPATIENT
Start: 2019-04-21 | End: 2019-04-24

## 2019-04-21 RX ORDER — SUCCINYLCHOLINE CHLORIDE 20 MG/ML
INJECTION INTRAMUSCULAR; INTRAVENOUS
Status: DISCONTINUED | OUTPATIENT
Start: 2019-04-21 | End: 2019-04-21

## 2019-04-21 RX ORDER — VASOPRESSIN 20 [USP'U]/ML
INJECTION, SOLUTION INTRAMUSCULAR; SUBCUTANEOUS
Status: DISCONTINUED | OUTPATIENT
Start: 2019-04-21 | End: 2019-04-21

## 2019-04-21 RX ORDER — KETAMINE HCL IN 0.9 % NACL 50 MG/5 ML
SYRINGE (ML) INTRAVENOUS
Status: DISCONTINUED | OUTPATIENT
Start: 2019-04-21 | End: 2019-04-21

## 2019-04-21 RX ORDER — HYDROCODONE BITARTRATE AND ACETAMINOPHEN 500; 5 MG/1; MG/1
TABLET ORAL
Status: DISCONTINUED | OUTPATIENT
Start: 2019-04-21 | End: 2019-04-22

## 2019-04-21 RX ORDER — NOREPINEPHRINE BITARTRATE 1 MG/ML
INJECTION, SOLUTION INTRAVENOUS
Status: COMPLETED
Start: 2019-04-21 | End: 2019-04-21

## 2019-04-21 RX ORDER — FENTANYL CITRATE 50 UG/ML
50 INJECTION, SOLUTION INTRAMUSCULAR; INTRAVENOUS
Status: DISCONTINUED | OUTPATIENT
Start: 2019-04-24 | End: 2019-05-06

## 2019-04-21 RX ORDER — SODIUM CHLORIDE 9 MG/ML
INJECTION, SOLUTION INTRAVENOUS CONTINUOUS
Status: DISCONTINUED | OUTPATIENT
Start: 2019-04-21 | End: 2019-04-23

## 2019-04-21 RX ADMIN — EPINEPHRINE 5 MCG: 0.1 INJECTION, SOLUTION ENDOTRACHEAL; INTRACARDIAC; INTRAVENOUS at 01:04

## 2019-04-21 RX ADMIN — SODIUM BICARBONATE 50 MEQ: 84 INJECTION, SOLUTION INTRAVENOUS at 07:04

## 2019-04-21 RX ADMIN — SODIUM CHLORIDE: 0.9 INJECTION, SOLUTION INTRAVENOUS at 09:04

## 2019-04-21 RX ADMIN — Medication 30 MG: at 12:04

## 2019-04-21 RX ADMIN — ROCURONIUM BROMIDE 50 MG: 10 INJECTION, SOLUTION INTRAVENOUS at 05:04

## 2019-04-21 RX ADMIN — FENTANYL CITRATE 100 MCG: 50 INJECTION, SOLUTION INTRAMUSCULAR; INTRAVENOUS at 02:04

## 2019-04-21 RX ADMIN — SODIUM BICARBONATE 50 MEQ: 84 INJECTION, SOLUTION INTRAVENOUS at 02:04

## 2019-04-21 RX ADMIN — VASOPRESSIN 1 UNITS: 20 INJECTION INTRAVENOUS at 12:04

## 2019-04-21 RX ADMIN — EPINEPHRINE 5 MCG: 0.1 INJECTION, SOLUTION ENDOTRACHEAL; INTRACARDIAC; INTRAVENOUS at 02:04

## 2019-04-21 RX ADMIN — PIPERACILLIN AND TAZOBACTAM 4.5 G: 4; .5 INJECTION, POWDER, LYOPHILIZED, FOR SOLUTION INTRAVENOUS; PARENTERAL at 01:04

## 2019-04-21 RX ADMIN — VASOPRESSIN 1 UNITS: 20 INJECTION INTRAVENOUS at 01:04

## 2019-04-21 RX ADMIN — SODIUM CHLORIDE 1000 ML: 0.9 INJECTION, SOLUTION INTRAVENOUS at 03:04

## 2019-04-21 RX ADMIN — LIDOCAINE HYDROCHLORIDE 100 MG: 20 INJECTION, SOLUTION INTRAVENOUS at 12:04

## 2019-04-21 RX ADMIN — CALCIUM GLUCONATE 2000 MG: 98 INJECTION, SOLUTION INTRAVENOUS at 11:04

## 2019-04-21 RX ADMIN — EPINEPHRINE 10 MCG: 0.1 INJECTION, SOLUTION ENDOTRACHEAL; INTRACARDIAC; INTRAVENOUS at 05:04

## 2019-04-21 RX ADMIN — Medication 0.22 MCG/KG/MIN: at 03:04

## 2019-04-21 RX ADMIN — VASOPRESSIN 0.04 UNITS/MIN: 20 INJECTION INTRAVENOUS at 08:04

## 2019-04-21 RX ADMIN — SODIUM CHLORIDE, SODIUM LACTATE, POTASSIUM CHLORIDE, AND CALCIUM CHLORIDE 1000 ML: .6; .31; .03; .02 INJECTION, SOLUTION INTRAVENOUS at 09:04

## 2019-04-21 RX ADMIN — SODIUM CHLORIDE, SODIUM GLUCONATE, SODIUM ACETATE, POTASSIUM CHLORIDE, MAGNESIUM CHLORIDE, SODIUM PHOSPHATE, DIBASIC, AND POTASSIUM PHOSPHATE: .53; .5; .37; .037; .03; .012; .00082 INJECTION, SOLUTION INTRAVENOUS at 12:04

## 2019-04-21 RX ADMIN — IOHEXOL 15 ML: 300 INJECTION, SOLUTION INTRAVENOUS at 06:04

## 2019-04-21 RX ADMIN — SODIUM CHLORIDE 1000 ML: 0.9 INJECTION, SOLUTION INTRAVENOUS at 04:04

## 2019-04-21 RX ADMIN — PROPOFOL 20 MCG/KG/MIN: 10 INJECTION, EMULSION INTRAVENOUS at 10:04

## 2019-04-21 RX ADMIN — CALCIUM CHLORIDE 300 MG: 100 INJECTION, SOLUTION INTRAVENOUS at 02:04

## 2019-04-21 RX ADMIN — SODIUM CHLORIDE, SODIUM GLUCONATE, SODIUM ACETATE, POTASSIUM CHLORIDE, MAGNESIUM CHLORIDE, SODIUM PHOSPHATE, DIBASIC, AND POTASSIUM PHOSPHATE: .53; .5; .37; .037; .03; .012; .00082 INJECTION, SOLUTION INTRAVENOUS at 05:04

## 2019-04-21 RX ADMIN — SODIUM BICARBONATE 50 MEQ: 84 INJECTION, SOLUTION INTRAVENOUS at 08:04

## 2019-04-21 RX ADMIN — ROCURONIUM BROMIDE 20 MG: 10 INJECTION, SOLUTION INTRAVENOUS at 05:04

## 2019-04-21 RX ADMIN — SODIUM CHLORIDE, SODIUM GLUCONATE, SODIUM ACETATE, POTASSIUM CHLORIDE, MAGNESIUM CHLORIDE, SODIUM PHOSPHATE, DIBASIC, AND POTASSIUM PHOSPHATE: .53; .5; .37; .037; .03; .012; .00082 INJECTION, SOLUTION INTRAVENOUS at 02:04

## 2019-04-21 RX ADMIN — VASOPRESSIN 2 UNITS: 20 INJECTION INTRAVENOUS at 12:04

## 2019-04-21 RX ADMIN — SODIUM BICARBONATE 50 MEQ: 84 INJECTION, SOLUTION INTRAVENOUS at 01:04

## 2019-04-21 RX ADMIN — SODIUM CHLORIDE, SODIUM LACTATE, POTASSIUM CHLORIDE, AND CALCIUM CHLORIDE 1000 ML: .6; .31; .03; .02 INJECTION, SOLUTION INTRAVENOUS at 07:04

## 2019-04-21 RX ADMIN — SODIUM CHLORIDE 2 UNITS/HR: 9 INJECTION, SOLUTION INTRAVENOUS at 03:04

## 2019-04-21 RX ADMIN — SUCCINYLCHOLINE CHLORIDE 120 MG: 20 INJECTION, SOLUTION INTRAMUSCULAR; INTRAVENOUS at 12:04

## 2019-04-21 RX ADMIN — PROPOFOL 40 MCG/KG/MIN: 10 INJECTION, EMULSION INTRAVENOUS at 09:04

## 2019-04-21 RX ADMIN — MIDAZOLAM HYDROCHLORIDE 2 MG: 1 INJECTION, SOLUTION INTRAMUSCULAR; INTRAVENOUS at 05:04

## 2019-04-21 RX ADMIN — Medication 0.16 MCG/KG/MIN: at 10:04

## 2019-04-21 RX ADMIN — PHENYLEPHRINE HYDROCHLORIDE 200 MCG: 10 INJECTION INTRAVENOUS at 12:04

## 2019-04-21 RX ADMIN — Medication 0.06 MCG/KG/MIN: at 06:04

## 2019-04-21 RX ADMIN — FENTANYL CITRATE 100 MCG: 50 INJECTION, SOLUTION INTRAMUSCULAR; INTRAVENOUS at 12:04

## 2019-04-21 RX ADMIN — SODIUM CHLORIDE, SODIUM GLUCONATE, SODIUM ACETATE, POTASSIUM CHLORIDE, MAGNESIUM CHLORIDE, SODIUM PHOSPHATE, DIBASIC, AND POTASSIUM PHOSPHATE: .53; .5; .37; .037; .03; .012; .00082 INJECTION, SOLUTION INTRAVENOUS at 01:04

## 2019-04-21 RX ADMIN — PROPOFOL 15 MCG/KG/MIN: 10 INJECTION, EMULSION INTRAVENOUS at 03:04

## 2019-04-21 RX ADMIN — VASOPRESSIN 2 UNITS: 20 INJECTION INTRAVENOUS at 01:04

## 2019-04-21 RX ADMIN — SODIUM CHLORIDE: 0.9 INJECTION, SOLUTION INTRAVENOUS at 05:04

## 2019-04-21 RX ADMIN — PROPOFOL 35 MCG/KG/MIN: 10 INJECTION, EMULSION INTRAVENOUS at 11:04

## 2019-04-21 RX ADMIN — ROCURONIUM BROMIDE 10 MG: 10 INJECTION, SOLUTION INTRAVENOUS at 12:04

## 2019-04-21 RX ADMIN — NOREPINEPHRINE BITARTRATE 4000 MCG: 1 INJECTION, SOLUTION, CONCENTRATE INTRAVENOUS at 10:04

## 2019-04-21 RX ADMIN — MIDAZOLAM HYDROCHLORIDE 2 MG: 1 INJECTION, SOLUTION INTRAMUSCULAR; INTRAVENOUS at 02:04

## 2019-04-21 RX ADMIN — VASOPRESSIN 0.04 UNITS/MIN: 20 INJECTION INTRAVENOUS at 03:04

## 2019-04-21 RX ADMIN — SODIUM BICARBONATE 50 MEQ: 84 INJECTION, SOLUTION INTRAVENOUS at 04:04

## 2019-04-21 RX ADMIN — CALCIUM CHLORIDE 400 MG: 100 INJECTION, SOLUTION INTRAVENOUS at 02:04

## 2019-04-21 RX ADMIN — MIDAZOLAM HYDROCHLORIDE 2 MG: 1 INJECTION, SOLUTION INTRAMUSCULAR; INTRAVENOUS at 12:04

## 2019-04-21 RX ADMIN — NOREPINEPHRINE BITARTRATE 0.01 MCG/KG/MIN: 1 INJECTION, SOLUTION, CONCENTRATE INTRAVENOUS at 01:04

## 2019-04-21 RX ADMIN — EPINEPHRINE 20 MCG: 0.1 INJECTION, SOLUTION ENDOTRACHEAL; INTRACARDIAC; INTRAVENOUS at 01:04

## 2019-04-21 NOTE — ANESTHESIA PREPROCEDURE EVALUATION
[facility]  Anesthesia Pre-Operative Evaluation         Patient Name: Mariann Huff  YOB: 1960  MRN: [id number]    SUBJECTIVE:     Pre-operative evaluation for Procedure(s) (LRB):  Creation, Nephrostomy, Percutaneous (Left)     04/21/2019    Mariann Huff is a 59 y.o. female w/ a significant PMHx of .    Patient now presents for the above procedure(s).    Vent Mode: SIMV  Oxygen Concentration (%):  [60] 60  Resp Rate Total:  [16 br/min] 16 br/min  Vt Set:  [550 mL] 550 mL  PEEP/CPAP:  [5 cmH20] 5 cmH20  Pressure Support:  [10 cmH20] 10 cmH20  Mean Airway Pressure:  [14 cmH20] 14 cmH20        LDA: None documented.       Percutaneous Central Line Insertion/Assessment - triple lumen  04/21/19 0100 (Active)   Number of days: 0            Peripheral IV - Single Lumen 04/12/19 1135 20 G Left Forearm (Active)   Number of days: 8            Peripheral IV - Single Lumen 04/12/19 1627 16 G Right Wrist (Active)   Number of days: 8            Peripheral IV - Single Lumen 04/20/19 1650 18 G Left Antecubital (Active)   Number of days: 0            Peripheral IV - Single Lumen 04/20/19 1710 18 G Right Antecubital (Active)   Site Assessment Clean;Dry;Intact;No redness;No swelling 4/20/2019  5:10 PM   Line Status Blood return noted;Capped;Flushed;Saline locked 4/20/2019  5:10 PM   Number of days: 0            Arterial Line 04/21/19 0031 Left Radial (Active)   Number of days: 0            Closed/Suction Drain 04/12/19 2115 Left Other (Comment) Bulb 10 Fr. (Active)   Number of days: 8            Open Drain 04/21/19 0218 Left LUQ (Active)   Number of days: 0            Urethral Catheter 04/20/19 1711 Latex 16 Fr. (Active)   Output (mL) 900 mL 4/20/2019  5:00 PM   Number of days: 0       Prev airway: None documented.    Drips: None documented.   sodium chloride 0.9%      fentanyl      insulin (HUMAN R) infusion (adults) 2 Units/hr (04/21/19 0334)     norepinephrine bitartrate-D5W 0.22 mcg/kg/min (04/21/19 9684)    propofol 15 mcg/kg/min (04/21/19 7615)       Patient Active Problem List   Diagnosis    Hypertension associated with diabetes    Hyperlipidemia    CAD (coronary artery disease)    Sleep apnea    Carotid stenosis, bilateral    Non compliance with medical treatment    Coronary stent restenosis    S/P coronary artery stent placement    Nuclear sclerosis - Right Eye    Primary localized osteoarthrosis, lower leg    Chronic sinusitis    PSC (posterior subcapsular cataract), right    DME (diabetic macular edema)    Refractive error    Pseudophakia    Senile nuclear sclerosis    Type 2 diabetes mellitus with diabetic peripheral angiopathy without gangrene    Diabetic peripheral neuropathy associated with type 2 diabetes mellitus    Cervical arthritis    Neurogenic claudication    Lumbar herniated disc    Fatty liver disease, nonalcoholic    Arthritis of lumbar spine    Chronic pain    Lumbar radiculopathy    Lumbosacral radiculopathy at L5    Ureteral transection of left native kidney    Ureteral stent retained    Sepsis    Encephalopathy    Type 2 diabetes mellitus, with long-term current use of insulin    HLD (hyperlipidemia)    Asthma    Essential hypertension       Review of patient's allergies indicates:  No Known Allergies    Current Inpatient Medications:   piperacillin-tazobactam (ZOSYN) IVPB  4.5 g Intravenous Q12H    sodium chloride 0.9%  1,000 mL Intravenous Once       Current Facility-Administered Medications on File Prior to Encounter   Medication Dose Route Frequency Provider Last Rate Last Dose    lidocaine (PF) 10 mg/ml (1%) injection 10 mg  1 mL Intradermal Once Dave Bentley Jr., MD         Current Outpatient Medications on File Prior to Encounter   Medication Sig Dispense Refill    albuterol (PROVENTIL/VENTOLIN HFA) 90 mcg/actuation inhaler Inhale 2 puffs into the lungs every 6 (six) hours as  "needed for Wheezing. 1 each 11    amLODIPine (NORVASC) 10 MG tablet TAKE 1 TABLET (10 MG TOTAL) BY MOUTH ONCE DAILY. 30 tablet 6    aspirin 81 mg Tab Take 81 mg by mouth. 1 Tablet Oral Every day      atorvastatin (LIPITOR) 40 MG tablet TAKE 1 TABLET (40 MG TOTAL) BY MOUTH ONCE DAILY. 30 tablet 11    ACCU-CHEK EDIN PLUS METER [facility] [place] [place]       BD INSULIN PEN NEEDLE UF MINI 31 x 3/16 " Ndle USE 4 TIMES A  each 11    blood sugar diagnostic (ACCU-CHEK EDIN PLUS TEST STRP) Strp TEST BLOOD SUGARS 4 TIMES DAILY 150 strip 11    chlorthalidone (HYGROTEN) 50 MG Tab Take 1 tablet (50 mg total) by mouth once daily. 90 tablet 3    cyclobenzaprine (FLEXERIL) 10 MG tablet TAKE 1 TABLET BY MOUTH 3 TIMES A DAY AS NEEDED FOR MUSCLE SPASMS. NO WORKING OR DRIVING ON THIS MED 45 tablet 5    esomeprazole (NEXIUM) 40 MG capsule TAKE 1 CAPSULE (40 MG TOTAL) BY MOUTH BEFORE BREAKFAST. 90 capsule 3    fenofibrate micronized (LOFIBRA) 134 MG Cap TAKE 1 CAPSULE (134 MG TOTAL) BY MOUTH BEFORE BREAKFAST. 90 capsule 3    flash glucose scanning reader (FREESTYLE MISTI 14 DAY READER) Misc 1 each by Misc.(Non-Drug; Combo Route) route once daily. 2 each 11    flash glucose sensor (FREESTYLE MISTI 14 DAY SENSOR) Kit 1 each by Misc.(Non-Drug; Combo Route) route once daily. 2 kit 11    gabapentin (NEURONTIN) 100 MG capsule Take 2 capsules (200 mg total) by mouth every evening. 60 capsule 11    insulin aspart U-100 (NOVOLOG FLEXPEN U-100 INSULIN) 100 unit/mL InPn pen INJECT 35 U AM, 45 U AT NOON, 35 U PM.150-200 +2, 201-250 +4, 251-300 +6, 301-350 +8, >350 +10 30 Syringe 1    insulin glargine, TOUJEO, (TOUJEO SOLOSTAR U-300 INSULIN) 300 unit/mL (1.5 mL) InPn pen Inject 50 Units into the skin 2 (two) times daily. 6 Syringe 6    INVOKANA 300 mg Tab tablet Take 1 tablet (300 mg total) by mouth once daily. 30 tablet 6    irbesartan (AVAPRO) 300 MG tablet Take 1 tablet (300 mg total) by mouth every evening. 90 tablet 3    lancets 30 gauge " "Misc       metoprolol succinate (TOPROL-XL) 100 MG 24 hr tablet TAKE 1 TABLET (100 MG TOTAL) BY MOUTH ONCE DAILY. 30 tablet 11    nitroGLYCERIN (NITROSTAT) 0.4 MG SL tablet Take one every 2-3 min till chestpain relief for 3 times and if still no relief, call MD or come to ED 30 tablet 11    omega-3 acid ethyl esters (LOVAZA) 1 gram capsule Take 1 g by mouth once daily.       pen needle, diabetic (BD ULTRA-FINE GRABIEL PEN NEEDLES) 32 gauge x 5/32" Ndle For use with insulin pens daily 4 times a day 100 each 11    prasugrel (EFFIENT) 10 mg Tab TAKE 1 TABLET (10 MG TOTAL) BY MOUTH ONCE DAILY. 30 tablet 10    tamsulosin (FLOMAX) 0.4 mg Cap Take 1 capsule (0.4 mg total) by mouth every evening. 30 capsule 2    tramadol (ULTRAM) 50 mg tablet Take 1 tablet (50 mg total) by mouth 3 (three) times daily as needed for Pain. 60 tablet 1    TRULICITY 1.5 mg/0.5 mL PnIj INJECT 1.5 MG INTO THE SKIN EVERY 7 DAYS. 2 mL 6    zolpidem (AMBIEN) 5 MG Tab Take 1 tablet (5 mg total) by mouth nightly as needed. 30 tablet 2       Past Surgical History:   Procedure Laterality Date    CARDIAC CATHETERIZATION      cataract extraction left eye      cataracts      CATHETERIZATION, HEART, LEFT Left 2014    Performed by [facility]man Kim [facility] at [facility] CATH LAB     SECTION, LOW TRANSVERSE      CORONARY ANGIOPLASTY      ESOPHAGOGASTRODUODENOSCOPY (EGD) N/A 2016    Performed by Gardenia Adamson MD at [facility] ENDO (4TH FLR)    EXCISION TURBINATE, SUBMUCOUS      FUSION, SPINE, LUMBAR, ANTERIOR APPROACH Left L5-S1 Anterior to Psoa Interbody Fusion, L5-S1 Posterior Instrumentation Left 2019    Performed by Mk George MD at [facility] OR 2ND FLR    HAND SURGERY Left     HAND SURGERY Right     torn ligament repair/ Dr. Yeboah    HYSTERECTOMY      Injection,steroid,epidural,transforaminal approach - Bilateral - S1 Bilateral 2018    Per[facility] at [facility] PAIN MGT    " Injection-steroid-epidural-caudal N/A 2/7/2019    Performed by Dave [facility]ley Jr., [facility] at [facility] PAIN MGT    INSERTION-INTRAOCULAR LENS (IOL) Right 9/1/2015    Performed by Good Domingo MD at [facility] OR 1ST FLR    left foot surgery      left wrist surgery      NASAL SEPTUM SURGERY  5/7/15    PHACOEMULSIFICATION-ASPIRATION-CATARACT Right 9/1/2015    Performed by Good Domingo MD at [facility] OR 1ST FLR    REPAIR, URETER  4/12/2019    Performed by Mk George MD at [facility] OR 2ND FLR    RESECTION-TURBINATES (SMR) N/A 5/7/2015    Performed by [place] Dubois III, MD at [facility] OR 2ND FLR    rt elbow surgery      S/P LAD COATED STENT  05/14/2010    6 total     S/P OM1 STENT  08/2003    SEPTOPLASTY N/A 5/7/2015    Performed by [place] Dubois III, MD at [facility] OR 2ND FLR    SINUS SURGERY      F.E.S.S.    SINUS SURGERY FUNCTIONAL ENDOSCOPIC WITH NAVIGATION WITH MAXILLARIES, ETHMOIDS, LEFT SPHENOID, LEFT LOLY N/A 5/7/2015    Performed by Dileep Dubois III, MD at [facility] OR 2ND FLR    STENT, URETERAL placement  4/12/2019    Performed by Robin Boyd MD at [facility] OR 2ND FLR    TUBAL LIGATION         Social History     Socioeconomic History    Marital status:      Spouse name: Shamir    Number of children: 2    Years of education: Not on file    Highest education level: Not on file   Occupational History    Occupation: cafeteria     Employer: [employer]     Employer: [employer]     Employer: [employer]   Social Needs    Financial resource strain: Not on file    Food insecurity:     Worry: Not on file     Inability: Not on file    Transportation needs:     Medical: Not on file     Non-medical: Not on file   Tobacco Use    Smoking status: Never Smoker    Smokeless tobacco: Never Used   Substance and Sexual Activity    Alcohol use: No     Alcohol/week: 0.0 oz    Drug use: No    Sexual activity: Yes     Partners: Male     Birth control/protection:  Post-menopausal     Comment:    Lifestyle    Physical activity:     Days per week: Not on file     Minutes per session: Not on file    Stress: Not on file   Relationships    Social connections:     Talks on phone: Not on file     Gets together: Not on file     Attends [employer] service: Not on file     Active member of club or organization: Not on file     Attends meetings of clubs or organizations: Not on file     Relationship status: Not on file   Other Topics Concern    Are you pregnant or think you may be? No    Breast-feeding No   Social History Narrative    . 2 children.        OBJECTIVE:     Vital Signs Range (Last 24H):  Temp:  [36.9 °C (98.4 °F)-39.5 °C (103.1 °F)]   Pulse:  []   Resp:  [16-38]   BP: ()/(51-61)   SpO2:  [94 %-100 %]   Arterial Line BP: (113)/(37)       Significant Labs:  Lab Results   Component Value Date    WBC 8.31 04/21/2019    HGB 6.3 (L) 04/21/2019    HCT 20.7 (L) 04/21/2019     (L) 04/21/2019    CHOL 202 (H) 01/21/2019    TRIG 100 01/21/2019    HDL 48 01/21/2019    ALT 35 04/20/2019    AST 49 (H) 04/20/2019     04/21/2019    K 4.1 04/21/2019     04/21/2019    CREATININE 3.6 (H) 04/20/2019    BUN 66 (H) 04/20/2019    CO2 23 04/20/2019    TSH 1.170 10/22/2018    INR 0.9 04/04/2019    GLUF 217 (H) 09/15/2014    HGBA1C 10.8 (H) 02/19/2019       Diagnostic Studies: No relevant studies.    EKG: No recent studies available.    2D ECHO:  Results for orders placed or performed in visit on 02/26/14   2D echo with color flow doppler   Result Value Ref Range    QEF 65     Diastolic Dysfunction Yes          ASSESSMENT/PLAN:         Anesthesia Evaluation    I have reviewed the Patient Summary Reports.     I have reviewed the Medications.     Review of Systems  Anesthesia Hx:  No problems with previous Anesthesia  History of prior surgery of interest to airway management or planning: Denies Family Hx of Anesthesia complications.   Denies Personal Hx  of Anesthesia complications.   Hematology/Oncology:     Oncology Normal    -- Denies Anemia:   Cardiovascular:   Hypertension, well controlled Past MI (had MI 2013, went to er and they placed stents a few days later.) CAD asymptomatic CABG/stent   Denies Angina.     Walking severely limited by LBP, not any cardiac symptoms.   Pulmonary:   Denies COPD. Asthma moderate  Denies Shortness of breath. Sleep Apnea (does not tolerate mask)    Renal/:   Denies Chronic Renal Disease.     Neurological:   Denies TIA. Denies CVA. Denies Seizures.    Endocrine:   Diabetes, type 2, using insulin Novalog, trujeo, trulicity       Physical Exam  General:  Well nourished, Obesity Tachypneic, shivering    Airway/Jaw/Neck:  Airway Findings: Mouth Opening: Normal Tongue: Large  Pre-Existing Airway Tube(s): Oral Endotracheal tube  Size: 7.5  General Airway Assessment: Adult  Mallampati: IV  Improves to III with phonation.  TM Distance: Normal, at least 6 cm  Jaw/Neck Findings:  Neck ROM: Normal ROM  Neck Findings: Normal    Eyes/Ears/Nose:  EYES/EARS/NOSE FINDINGS: Normal   Dental:  Dental Findings: In tact   Chest/Lungs:  Chest/Lungs Findings: Clear to auscultation     Heart/Vascular:  Heart Findings: Rate: Tachycardia  Rhythm: Regular Rhythm        Mental Status:  Mental Status Findings:  Confusion         Anesthesia Plan  Type of Anesthesia, risks & benefits discussed:  Anesthesia Type:  general  Patient's Preference:   Intra-op Monitoring Plan: standard ASA monitors, arterial line and central line  Intra-op Monitoring Plan Comments:   Post Op Pain Control Plan: multimodal analgesia, IV/PO Opioids PRN and per primary service following discharge from PACU  Post Op Pain Control Plan Comments:   Induction:   IV  Beta Blocker:  Patient is on a Beta-Blocker and has not received dose within the past 24 hours due to non-compliance or for other reasons (Patient should receive a perioperative dose or document why it is withheld).        Informed Consent: Patient representative understands risks and agrees with Anesthesia plan.  Questions answered. Anesthesia consent signed with patient representative.  ASA Score: 4  emergent   Day of Surgery Review of History & Physical:    H&P update referred to the surgeon.         Ready For Surgery From Anesthesia Perspective.

## 2019-04-21 NOTE — PROCEDURES
Radiology Post-Procedure Note    Pre Op Diagnosis: H/o left sided ureteral injury with recent UTI and ureteral clipping for urinary diversion with JJ removal; septic shock  Post Op Diagnosis: Same    Procedure: Percutaneous nephrostomy tube placement    Procedure performed by: Bishnu Jiménez MD    Written Informed Consent Obtained: Yes  Specimen Removed: NO  Estimated Blood Loss: Minimal    Findings:   US and fluoroscopic guided left PCN placed.  8F tube in place.  1mL serosanguinous fluid sent for analysis and culture.    Patient tolerated procedure well.    Bishnu Jiménez MD  Diagnostic and Interventional Radiologist  Department of Radiology  Pager: [phone]

## 2019-04-21 NOTE — SUBJECTIVE & OBJECTIVE
Interval History:   Continues to require pressor support however weaning with administration of blood and fluids  Uncomplicated left neph tube placement with clear output  Afebrile following surgery    Review of Systems  Objective:     Temp:  [97.7 °F (36.5 °C)-103.1 °F (39.5 °C)] 97.7 °F (36.5 °C)  Pulse:  [] 87  Resp:  [16-38] 18  SpO2:  [94 %-100 %] 99 %  BP: ()/(44-61) 101/44  Arterial Line BP: (107-141)/(37-53) 141/53     Body mass index is 27.46 kg/m².    Date 04/21/19 0700 - 04/22/19 0659   Shift 3216-0080 5946-9692 [id number] 24 Hour Total   INTAKE   Shift Total(mL/kg)       OUTPUT   Urine(mL/kg/hr) 120   120   Shift Total(mL/kg) 120(1.7)   120(1.7)   Weight (kg) 72.6 72.6 72.6 72.6          Drains     Drain                 NG/OG Tube 04/21/19 0728 Center mouth less than 1 day         Nephrostomy 04/21/19 0629 Left 8 Fr. less than 1 day         Open Drain 04/21/19 0218 Left LUQ less than 1 day         Urethral Catheter 04/20/19 1711 Latex 16 Fr. less than 1 day                Physical Exam   Constitutional: She appears well-developed and well-nourished.   HENT:   Head: Normocephalic and atraumatic.   Neck: No JVD present.   Cardiovascular: Normal rate and regular rhythm.    Pulmonary/Chest:   Intubated   Abdominal: Soft. She exhibits distension. There is no rebound and no guarding.   Dressing c/d/i  MADHURI drain with minimal SS output   Genitourinary:   Genitourinary Comments: Fontenot draining clear yellow  Left neph tube with clear yellow urine   Neurological:   Sedated   Skin: She is not diaphoretic. No pallor.         Significant Labs:    BMP:  Recent Labs   Lab 04/20/19 1710 04/21/19  0301   * 142   K 4.3 4.1   CL 96 103   CO2 23 17*   BUN 66* 60*   CREATININE 3.6* 3.4*   CALCIUM 10.0 8.6*       CBC:   Recent Labs   Lab 04/20/19 1710 04/21/19  0140 04/21/19  0219 04/21/19  0301   WBC 5.01  --   --  8.31   HGB 9.3*  --   --  6.3*   HCT 29.9* 20* 16* 20.7*     --   --  146*       All  pertinent labs results from the past 24 hours have been reviewed.    Significant Imaging:  All pertinent imaging results/findings from the past 24 hours have been reviewed.

## 2019-04-21 NOTE — CONSULTS
Patient evaluated by critical care medicine and will be admitted to MICU. Please see full consult note to follow.    Deborah Shea MD  IM PGY-3

## 2019-04-21 NOTE — HPI
Mariann Huff is a 59 y.o. female with history of HTN, type 2 diabetes mellitus, CAD, NSTEMI, and back pain who presents to [facility] with altered mental status and sepsis. She underwent L5-S1 OLIF with NSGY on 4/12 and had intraoperative left ureteral injury with ureteroureteral anastomosis and ureteral stent placement. She did well initially and had correa and MADHURI drain removed on 4/16. She began having chills and altered mental status 2 days ago and this has progressively worsened. No complaints of pain.     She is altered and HPI is limited. In the ED she is febrile to 103 and tachycardic and pressures are low normal. WBC is 5, creatinine is 3.6 baseline 1.0, lactate is 4.6. Cath UA concerning for infection, 3+ LE, >100 WBCs, and many bacteria on microscopy.    CT and MRI abdomen both show air with minimal fluid near the surgical site with left ureter coursing through. There is air throughout the proximal collecting system which is decompressed with JJ ureteral stent in good position.    Taken for ex lap, ligation of left ureter and left neph tube placement on 4/21/19.

## 2019-04-21 NOTE — SUBJECTIVE & OBJECTIVE
Past Medical History:   Diagnosis Date    Anticoagulant long-term use     Asthma     Back pain     CAD (coronary artery disease)     s/p stentimg  (2), (1)    Carotid artery stenosis     Diabetes mellitus type 2 in obese     HTN (hypertension), benign     Hyperlipidemia     Keloid cicatrix     NPDR (nonproliferative diabetic retinopathy) 2015    NSTEMI (non-ST elevated myocardial infarction)     Nuclear sclerosis - Right Eye 3/18/2014    Primary localized osteoarthrosis, lower leg 2014    Sleep apnea        Past Surgical History:   Procedure Laterality Date    CARDIAC CATHETERIZATION      cataract extraction left eye      cataracts      CATHETERIZATION, HEART, LEFT Left 2014    Performed by [facility]man [facility] at [facility] CATH LAB     SECTION, LOW TRANSVERSE      CORONARY ANGIOPLASTY      ESOPHAGOGASTRODUODENOSCOPY (EGD) N/A 2016    Performed by Gardenia Adamson MD at [facility] ENDO (4TH FLR)    EXCISION TURBINATE, SUBMUCOUS      FUSION, SPINE, LUMBAR, ANTERIOR APPROACH Left L5-S1 Anterior to Psoa Interbody Fusion, L5-S1 Posterior Instrumentation Left 2019    Performed by Mk George MD at [facility] OR 2ND FLR    HAND SURGERY Left     HAND SURGERY Right     torn ligament repair/ Dr. Yeboah    HYSTERECTOMY      Injection,steroid,epidural,transforaminal approach - Bilateral - S1 Bilateral 2018    Performed by Tl [facility]breu [facility] at [facility] PAIN MGT    Injection-steroid-epidural-caudal N/A 2019    Performed by Dave Bentley Jr., MD at [facility] PAIN MGT    INSERTION-INTRAOCULAR LENS (IOL) Right 2015    Performed by Good Domingo MD at [facility] OR 1ST FLR    left foot surgery      left wrist surgery      NASAL SEPTUM SURGERY  5/7/15    PHACOEMULSIFICATION-ASPIRATION-CATARACT Right 2015    Performed by Good [facility]ingo [facility] at [facility] OR 1ST FLR    REPAIR, URETER  2019    Performed by Mk George MD at [facility] OR 2ND FLR     RESECTION-TURBINATES (SMR) N/A 5/7/2015    Performed by Dileep [facility] III, [facility] at [facility] OR 2ND FLR    rt elbow surgery      S/P LAD COATED STENT  05/14/2010    6 total     S/P OM1 STENT  08/2003    SEPTOPLASTY N/A 5/7/2015    Performed by Dileep [facility]ois III, MD at [facility] OR 2ND FLR    SINUS SURGERY      F.E.S.S.    SINUS SURGERY FUNCTIONAL ENDOSCOPIC WITH NAVIGATION WITH MAXILLARIES, ETHMOIDS, LEFT SPHENOID, LEFT LOLY N/A 5/7/2015    Performed by Dileep [facility]ois III, MD at [facility] OR 2ND FLR    STENT, URETERAL placement  4/12/2019    Performed by Robin Boyd MD at [facility] OR [facility] FLR    TUBAL LIGATION         Review of patient's allergies indicates:  No Known Allergies    Family History     Problem Relation (Age of Onset)    Diabetes Mother, Father, Sister, Brother, Sister    Heart attack Father    Heart disease Mother    Leukemia Father    No Known Problems Sister, Brother, Brother, Maternal Grandmother, Maternal Grandfather, Paternal Grandmother, Paternal Grandfather, Son, Son, Maternal Aunt, Maternal Uncle, Paternal Aunt, Paternal Uncle        Tobacco Use    Smoking status: Never Smoker    Smokeless tobacco: Never Used   Substance and Sexual Activity    Alcohol use: No     Alcohol/week: 0.0 oz    Drug use: No    Sexual activity: Yes     Partners: Male     Birth control/protection: Post-menopausal     Comment:       Review of Systems   Constitutional: Positive for fever. Negative for chills.   HENT: Negative for congestion and rhinorrhea.    Eyes: Negative for photophobia and visual disturbance.   Respiratory: Negative for cough and shortness of breath.    Cardiovascular: Negative for chest pain, palpitations and leg swelling.   Gastrointestinal: Negative for abdominal pain, constipation and diarrhea.   Endocrine: Negative for cold intolerance and heat intolerance.   Genitourinary: Negative for dysuria and hematuria.   Musculoskeletal: Positive for back pain. Negative for arthralgias and  myalgias.   Skin: Negative for pallor and rash.   Neurological: Negative for light-headedness and headaches.   Hematological: Negative for adenopathy. Does not bruise/bleed easily.   Psychiatric/Behavioral: Positive for confusion.     Objective:     Vital Signs (Most Recent):  Temp: (!) 102.8 °F (39.3 °C)(Critical care team at bedside and aware) (04/20/19 2057)  Pulse: 102 (04/20/19 2141)  Resp: 18 (04/20/19 2057)  BP: (!) 100/53 (04/20/19 2057)  SpO2: 98 % (04/20/19 2141) Vital Signs (24h Range):  Temp:  [100.6 °F (38.1 °C)-103 °F (39.4 °C)] 102.8 °F (39.3 °C)  Pulse:  [] 102  Resp:  [16-20] 18  SpO2:  [94 %-98 %] 98 %  BP: (100-125)/(51-61) 100/53   Weight: 72.6 kg (160 lb)  Body mass index is 27.46 kg/m².      Intake/Output Summary (Last 24 hours) at 4/20/2019 2205  Last data filed at 4/20/2019 1836  Gross per 24 hour   Intake --   Output 1650 ml   Net -1650 ml       Physical Exam   Constitutional: She appears well-developed and well-nourished.   HENT:   Head: Normocephalic and atraumatic.   Eyes: Pupils are equal, round, and reactive to light. EOM are normal.   Neck: Neck supple. No thyromegaly present.   Cardiovascular: Normal rate, regular rhythm and normal heart sounds.   No murmur heard.  Pulmonary/Chest: Effort normal. No respiratory distress. She has no wheezes. She has no rales.   Decreased air movement   Abdominal: Soft. Bowel sounds are normal. She exhibits no distension. There is no tenderness.   Musculoskeletal: She exhibits no edema, tenderness or deformity.   Lymphadenopathy:     She has no cervical adenopathy.   Neurological: She is alert. No cranial nerve deficit.   AOx2   Skin: Skin is warm and dry.   Psychiatric: Her behavior is normal.       Vents:     Lines/Drains/Airways     Drain                 Closed/Suction Drain 04/12/19 2115 Left Other (Comment) Bulb 10 Fr. 8 days         Urethral Catheter 04/12/19 1650 Non-latex;Straight-tip 8 days         Urethral Catheter 04/20/19 1711 Latex 16  Fr. less than 1 day          Peripheral Intravenous Line                 Peripheral IV - Single Lumen 09/25/18 1355 Left Forearm 207 days         Peripheral IV - Single Lumen 01/10/19 0917 Left Antecubital 100 days         Peripheral IV - Single Lumen 04/12/19 1135 20 G Left Forearm 8 days         Peripheral IV - Single Lumen 04/12/19 1627 16 G Right Wrist 8 days         Peripheral IV - Single Lumen 04/20/19 1650 18 G Left Antecubital less than 1 day         Peripheral IV - Single Lumen 04/20/19 1710 18 G Right Antecubital less than 1 day              Significant Labs:    CBC/Anemia Profile:  Recent Labs   Lab 04/20/19  1710   WBC 5.01   HGB 9.3*   HCT 29.9*      MCV 85   RDW 12.8        Chemistries:  Recent Labs   Lab 04/20/19  1710   *   K 4.3   CL 96   CO2 23   BUN 66*   CREATININE 3.6*   CALCIUM 10.0   ALBUMIN 2.8*   PROT 7.8   BILITOT 1.5*   ALKPHOS 171*   ALT 35   AST 49*   MG 2.3       Significant Imaging: I have reviewed all pertinent imaging results/findings within the past 24 hours.

## 2019-04-21 NOTE — H&P
Inpatient Radiology Pre-procedure Note    History of Present Illness:  Mariann Huff is a 59 y.o. female who presents for image-guided placement of left-sided nephrostomy tube.    Admission H&P reviewed.  Past Medical History:   Diagnosis Date    Anticoagulant long-term use     Asthma     Back pain     CAD (coronary artery disease)     s/p stentimg  (2), (1)    Carotid artery stenosis     Diabetes mellitus type 2 in obese     HTN (hypertension), benign     Hyperlipidemia     Keloid cicatrix     NPDR (nonproliferative diabetic retinopathy) 2015    NSTEMI (non-ST elevated myocardial infarction)     Nuclear sclerosis - Right Eye 3/18/2014    Primary localized osteoarthrosis, lower leg 2014    Sleep apnea      Past Surgical History:   Procedure Laterality Date    CARDIAC CATHETERIZATION      cataract extraction left eye      cataracts      CATHETERIZATION, HEART, LEFT Left 2014    Performed by Wilman Kim MD at [facility] CATH LAB     SECTION, LOW TRANSVERSE      CORONARY ANGIOPLASTY      ESOPHAGOGASTRODUODENOSCOPY (EGD) N/A 2016    Performed by Gardenia Adamson MD at [facility] ENDO (4TH FLR)    EXCISION TURBINATE, SUBMUCOUS      FUSION, SPINE, LUMBAR, ANTERIOR APPROACH Left L5-S1 Anterior to Psoa Interbody Fusion, L5-S1 Posterior Instrumentation Left 2019    Performed by Mk George MD at [facility] OR 2ND FLR    HAND SURGERY Left     HAND SURGERY Right     torn ligament repair/ Dr. Yeboah    HYSTERECTOMY      Injection,steroid,epidural,transforaminal approach - Bilateral - S1 Bilateral 2018    Performed by Tl [facility]breu MD at [facility] PAIN MGT    Injection-steroid-epidural-caudal N/A 2019    Performed by Dave Bentley Jr., MD at [facility] PAIN MGT    INSERTION-INTRAOCULAR LENS (IOL) Right 2015    Performed by Good Domingo MD at [facility] OR 1ST FLR    left foot surgery      left wrist surgery      NASAL SEPTUM SURGERY  5/7/15  "   PHACOEMULSIFICATION-ASPIRATION-CATARACT Right 9/1/2015    Performed by Good Domingo MD at [facility] OR 1ST FLR    REPAIR, URETER  4/12/2019    Performed by Mk George MD at [facility] OR 2ND FLR    RESECTION-TURBINATES (SMR) N/A 5/7/2015    Performed by Lexingtonyin Dubois III, MD at [facility] OR 2ND FLR    rt elbow surgery      S/P LAD COATED STENT  05/14/2010    6 total     S/P OM1 STENT  08/2003    SEPTOPLASTY N/A 5/7/2015    Performed by Lexingtonyin Dubois III, MD at [facility] OR 2ND FLR    SINUS SURGERY      F.E.S.S.    SINUS SURGERY FUNCTIONAL ENDOSCOPIC WITH NAVIGATION WITH MAXILLARIES, ETHMOIDS, LEFT SPHENOID, LEFT LOLY N/A 5/7/2015    Performed by Dileep Dubois III, MD at [facility] OR 2ND FLR    STENT, URETERAL placement  4/12/2019    Performed by Robin Boyd MD at [facility] OR 2ND FLR    TUBAL LIGATION         Review of Systems:   As documented in primary team H&P    Home Meds:   Prior to Admission medications    Medication Sig Start Date End Date Taking? Authorizing Provider   albuterol (PROVENTIL/VENTOLIN HFA) 90 mcg/actuation inhaler Inhale 2 puffs into the lungs every 6 (six) hours as needed for Wheezing. 11/30/18  Yes Jasbir Haney MD   amLODIPine (NORVASC) 10 MG tablet TAKE 1 TABLET (10 MG TOTAL) BY MOUTH ONCE DAILY. 8/15/18  Yes Adelaide Aguilar MD   aspirin 81 mg Tab Take 81 mg by mouth. 1 Tablet Oral Every day   Yes Historical Provider, MD   atorvastatin (LIPITOR) 40 MG tablet TAKE 1 TABLET (40 MG TOTAL) BY MOUTH ONCE DAILY. 7/7/17  Yes Adelaide Aguilar MD   ACCU-CHEK EDIN PLUS METER Misc  9/23/14   Historical Provider, MD   BD INSULIN PEN NEEDLE UF MINI 31 x 3/16 " Ndle USE 4 TIMES A DAY 10/4/14   Rosmery Fisher NP   blood sugar diagnostic (ACCU-CHEK EDIN PLUS TEST STRP) Strp TEST BLOOD SUGARS 4 TIMES DAILY 8/31/15   Rosmery Fisher NP   chlorthalidone (HYGROTEN) 50 MG Tab Take 1 tablet (50 mg total) by mouth once daily. 12/7/18   Jasbir Haney MD   cyclobenzaprine (FLEXERIL) 10 MG tablet " "TAKE 1 TABLET BY MOUTH 3 TIMES A DAY AS NEEDED FOR MUSCLE SPASMS. NO WORKING OR DRIVING ON THIS MED 7/23/18   Jasbir Haney MD   esomeprazole (NEXIUM) 40 MG capsule TAKE 1 CAPSULE (40 MG TOTAL) BY MOUTH BEFORE BREAKFAST. 12/6/18   Jasbir Haney MD   fenofibrate micronized (LOFIBRA) 134 MG Cap TAKE 1 CAPSULE (134 MG TOTAL) BY MOUTH BEFORE BREAKFAST. 12/7/18   Jasbir Haney MD   flash glucose scanning reader (FREESTYLE MISTI 14 DAY READER) Misc 1 each by Misc.(Non-Drug; Combo Route) route once daily. 12/7/18   Jasbir Haney MD   flash glucose sensor (FREESTYLE MISTI 14 DAY SENSOR) Kit 1 each by Misc.(Non-Drug; Combo Route) route once daily. 12/7/18   Jasbir Haney MD   gabapentin (NEURONTIN) 100 MG capsule Take 2 capsules (200 mg total) by mouth every evening. 7/23/18 7/23/19  Jasbir Haney MD   insulin aspart U-100 (NOVOLOG FLEXPEN U-100 INSULIN) 100 unit/mL InPn pen INJECT 35 U AM, 45 U AT NOON, 35 U PM.150-200 +2, 201-250 +4, 251-300 +6, 301-350 +8, >350 +10 12/7/18   Jasbir Haney MD   insulin glargine, TOUJEO, (TOUJEO SOLOSTAR U-300 INSULIN) 300 unit/mL (1.5 mL) InPn pen Inject 50 Units into the skin 2 (two) times daily. 10/26/18   SEBASTIAN Stone,ANP-C   INVOKANA 300 mg Tab tablet Take 1 tablet (300 mg total) by mouth once daily. 10/26/18   SEBASTIAN Stone,ANP-C   irbesartan (AVAPRO) 300 MG tablet Take 1 tablet (300 mg total) by mouth every evening. 11/7/18 11/7/19  Jasbir Haney MD   lancets 30 gauge Misc  1/9/17   Historical Provider, MD   metoprolol succinate (TOPROL-XL) 100 MG 24 hr tablet TAKE 1 TABLET (100 MG TOTAL) BY MOUTH ONCE DAILY. 1/10/19   Jasbir Haney MD   nitroGLYCERIN (NITROSTAT) 0.4 MG SL tablet Take one every 2-3 min till chestpain relief for 3 times and if still no relief, call MD or come to ED 1/6/17   Jasbir Haney MD   omega-3 acid ethyl esters (LOVAZA) 1 gram capsule Take 1 g by mouth once daily.  11/10/14   Historical Provider, MD   pen needle, diabetic (BD ULTRA-FINE GRABIEL PEN NEEDLES) 32 gauge x 5/32" Ndle For use " with insulin pens daily 4 times a day 3/10/16   Jasbir Haney MD   prasugrel (EFFIENT) 10 mg Tab TAKE 1 TABLET (10 MG TOTAL) BY MOUTH ONCE DAILY. 12/6/18   Jasbir Haney MD   tamsulosin (FLOMAX) 0.4 mg Cap Take 1 capsule (0.4 mg total) by mouth every evening. 4/16/19   Robin Boyd MD   tramadol (ULTRAM) 50 mg tablet Take 1 tablet (50 mg total) by mouth 3 (three) times daily as needed for Pain. 10/24/16   Jasbir Haney MD   TRULICITY 1.5 mg/0.5 mL PnIj INJECT 1.5 MG INTO THE SKIN EVERY 7 DAYS. 3/26/19   Juan Urbina MD   zolpidem (AMBIEN) 5 MG Tab Take 1 tablet (5 mg total) by mouth nightly as needed. 12/7/18   Jasbir Haney MD     Scheduled Meds:    piperacillin-tazobactam (ZOSYN) IVPB  4.5 g Intravenous Q12H    sodium chloride 0.9%  1,000 mL Intravenous Once     Continuous Infusions:    fentanyl      insulin (HUMAN R) infusion (adults) 2 Units/hr (04/21/19 0334)    norepinephrine bitartrate-D5W 0.22 mcg/kg/min (04/21/19 0305)    propofol 15 mcg/kg/min (04/21/19 0334)     PRN Meds:sodium chloride, albuterol-ipratropium, nitroGLYCERIN, sodium chloride 0.9%  Anticoagulants/Antiplatelets: no anticoagulation    Allergies: Review of patient's allergies indicates:  No Known Allergies  Sedation Hx: have not been any systemic reactions    Labs:  No results for input(s): INR in the last 168 hours.    Invalid input(s):  PT,  PTT    Recent Labs   Lab 04/21/19  0301   WBC 8.31   HGB 6.3*   HCT 20.7*   MCV 86   *      Recent Labs   Lab 04/20/19  1710   *   *   K 4.3   CL 96   CO2 23   BUN 66*   CREATININE 3.6*   CALCIUM 10.0   MG 2.3   ALT 35   AST 49*   ALBUMIN 2.8*   BILITOT 1.5*         Vitals:  Temp: 98.4 °F (36.9 °C) (04/21/19 0250)  Pulse: 68 (04/21/19 0250)  Resp: 17 (04/21/19 0250)  BP: (!) 91/51 (04/20/19 2300)  SpO2: 98 % (04/21/19 0250)     Physical Exam:  ASA: 4  Mallampati: Unable to assess; patient ventilated    Limited physical examination secondary to alerted mental status.  General: no acute  distress  HEENT: normocephalic, atraumatic  Chest: ventilated  Abdomen: nondistended    Plan: Image-guided placement of left-sided nephrostomy tube  Sedation Plan: Per anesthesia    Dillan Costa M.D.  PGY-3 Radiology

## 2019-04-21 NOTE — ANESTHESIA PREPROCEDURE EVALUATION
[facility]  Anesthesia Pre-Operative Evaluation         Patient Name: Mariann Huff  YOB: 1960  MRN: [id number]    SUBJECTIVE:     Pre-operative evaluation for Procedure(s) (LRB):  LAPAROTOMY, EXPLORATORY (N/A)     04/20/2019    Mariann Huff is a 59 y.o. female w/ a significant PMHx of HTN, HLN, DM type 2, nonproliferative diabetic renopathy, CAD, NSTEMI, and back pain who presents to the ED with a complaint of altered mental status. S/p lumbar fusion 4/12/19.    Pt is currently altered and febrile; but HDS. Hgb 9.3, Cr 3.6, lactate 4.6.      Patient now presents for the above procedure(s).      LDA: None documented.       Peripheral IV - Single Lumen 09/25/18 1355 Left Forearm (Active)   Number of days: 207            Peripheral IV - Single Lumen 01/10/19 0917 Left Antecubital (Active)   Number of days: 100            Peripheral IV - Single Lumen 04/12/19 1135 20 G Left Forearm (Active)   Number of days: 8            Peripheral IV - Single Lumen 04/12/19 1627 16 G Right Wrist (Active)   Number of days: 8            Peripheral IV - Single Lumen 04/20/19 1650 18 G Left Antecubital (Active)   Number of days: 0            Peripheral IV - Single Lumen 04/20/19 1710 18 G Right Antecubital (Active)   Site Assessment Clean;Dry;Intact;No redness;No swelling 4/20/2019  5:10 PM   Line Status Blood return noted;Capped;Flushed;Saline locked 4/20/2019  5:10 PM   Number of days: 0            Closed/Suction Drain 04/12/19 2115 Left Other (Comment) Bulb 10 Fr. (Active)   Number of days: 8            Urethral Catheter 04/12/19 1650 Non-latex;Straight-tip (Active)   Number of days: 8            Urethral Catheter 04/20/19 1711 Latex 16 Fr. (Active)   Output (mL) 900 mL 4/20/2019  5:00 PM   Number of days: 0       Prev airway:  4/12/19  DL  Shahid 2  ETT 7.5  Grade 1    Drips: None documented.      Patient Active Problem List   Diagnosis    Hypertension associated with diabetes    Hyperlipidemia     CAD (coronary artery disease)    Sleep apnea    Carotid stenosis, bilateral    Non compliance with medical treatment    Coronary stent restenosis    S/P coronary artery stent placement    Nuclear sclerosis - Right Eye    Primary localized osteoarthrosis, lower leg    Chronic sinusitis    PSC (posterior subcapsular cataract), right    DME (diabetic macular edema)    Refractive error    Pseudophakia    Senile nuclear sclerosis    Type 2 diabetes mellitus with diabetic peripheral angiopathy without gangrene    Diabetic peripheral neuropathy associated with type 2 diabetes mellitus    Cervical arthritis    Neurogenic claudication    Lumbar herniated disc    Fatty liver disease, nonalcoholic    Arthritis of lumbar spine    Chronic pain    Lumbar radiculopathy    Lumbosacral radiculopathy at L5    Ureteral transection of left native kidney    Ureteral stent retained    Sepsis    Encephalopathy    Type 2 diabetes mellitus, with long-term current use of insulin    HLD (hyperlipidemia)    Asthma    Essential hypertension       Review of patient's allergies indicates:  No Known Allergies    Current Inpatient Medications:   [START ON 4/21/2019] piperacillin-tazobactam (ZOSYN) IVPB  4.5 g Intravenous Q12H       Current Facility-Administered Medications on File Prior to Encounter   Medication Dose Route Frequency Provider Last Rate Last Dose    lidocaine (PF) 10 mg/ml (1%) injection 10 mg  1 mL Intradermal Once Dave Bentley Jr., MD         Current Outpatient Medications on File Prior to Encounter   Medication Sig Dispense Refill    ACCU-CHEK EDIN PLUS METER Misc       albuterol (PROVENTIL/VENTOLIN HFA) 90 mcg/actuation inhaler Inhale 2 puffs into the lungs every 6 (six) hours as needed for Wheezing. 1 each 11    amLODIPine (NORVASC) 10 MG tablet TAKE 1 TABLET (10 MG TOTAL) BY MOUTH ONCE DAILY. 30 tablet 6    aspirin 81 mg Tab Take 81 mg by mouth. 1 Tablet Oral Every day       "atorvastatin (LIPITOR) 40 MG tablet TAKE 1 TABLET (40 MG TOTAL) BY MOUTH ONCE DAILY. 30 tablet 11    BD INSULIN PEN NEEDLE UF MINI 31 x 3/16 " Ndle USE 4 TIMES A  each 11    blood sugar diagnostic (ACCU-CHEK EDIN PLUS TEST STRP) Strp TEST BLOOD SUGARS 4 TIMES DAILY 150 strip 11    chlorthalidone (HYGROTEN) 50 MG Tab Take 1 tablet (50 mg total) by mouth once daily. 90 tablet 3    cyclobenzaprine (FLEXERIL) 10 MG tablet TAKE 1 TABLET BY MOUTH 3 TIMES A DAY AS NEEDED FOR MUSCLE SPASMS. NO WORKING OR DRIVING ON THIS MED 45 tablet 5    esomeprazole (NEXIUM) 40 MG capsule TAKE 1 CAPSULE (40 MG TOTAL) BY MOUTH BEFORE BREAKFAST. 90 capsule 3    fenofibrate micronized (LOFIBRA) 134 MG Cap TAKE 1 CAPSULE (134 MG TOTAL) BY MOUTH BEFORE BREAKFAST. 90 capsule 3    flash glucose scanning reader (FREESTYLE MISTI 14 DAY READER) Misc 1 each by Misc.(Non-Drug; Combo Route) route once daily. 2 each 11    flash glucose sensor (FREESTYLE MISTI 14 DAY SENSOR) Kit 1 each by Misc.(Non-Drug; Combo Route) route once daily. 2 kit 11    gabapentin (NEURONTIN) 100 MG capsule Take 2 capsules (200 mg total) by mouth every evening. 60 capsule 11    insulin aspart U-100 (NOVOLOG FLEXPEN U-100 INSULIN) 100 unit/mL InPn pen INJECT 35 U AM, 45 U AT NOON, 35 U PM.150-200 +2, 201-250 +4, 251-300 +6, 301-350 +8, >350 +10 30 Syringe 1    insulin glargine, TOUJEO, (TOUJEO SOLOSTAR U-300 INSULIN) 300 unit/mL (1.5 mL) InPn pen Inject 50 Units into the skin 2 (two) times daily. 6 Syringe 6    INVOKANA 300 mg Tab tablet Take 1 tablet (300 mg total) by mouth once daily. 30 tablet 6    irbesartan (AVAPRO) 300 MG tablet Take 1 tablet (300 mg total) by mouth every evening. 90 tablet 3    lancets 30 gauge Misc       metoprolol succinate (TOPROL-XL) 100 MG 24 hr tablet TAKE 1 TABLET (100 MG TOTAL) BY MOUTH ONCE DAILY. 30 tablet 11    nitroGLYCERIN (NITROSTAT) 0.4 MG SL tablet Take one every 2-3 min till chestpain relief for 3 times and if " "still no relief, call MD or come to ED 30 tablet 11    omega-3 acid ethyl esters (LOVAZA) 1 gram capsule Take 1 g by mouth once daily.       pen needle, diabetic (BD ULTRA-FINE GRABIEL PEN NEEDLES) 32 gauge x 5/32" Ndle For use with insulin pens daily 4 times a day 100 each 11    prasugrel (EFFIENT) 10 mg Tab TAKE 1 TABLET (10 MG TOTAL) BY MOUTH ONCE DAILY. 30 tablet 10    tamsulosin (FLOMAX) 0.4 mg Cap Take 1 capsule (0.4 mg total) by mouth every evening. 30 capsule 2    tramadol (ULTRAM) 50 mg tablet Take 1 tablet (50 mg total) by mouth 3 (three) times daily as needed for Pain. 60 tablet 1    TRULICITY 1.5 mg/0.5 mL PnIj INJECT 1.5 MG INTO THE SKIN EVERY 7 DAYS. 2 mL 6    zolpidem (AMBIEN) 5 MG Tab Take 1 tablet (5 mg total) by mouth nightly as needed. 30 tablet 2       Past Surgical History:   Procedure Laterality Date    CARDIAC CATHETERIZATION      cataract extraction left eye      cataracts      CATHETERIZATION, HEART, LEFT Left 2014    Performed by Wilman Kim MD at [facility] CATH LAB     SECTION, LOW TRANSVERSE      CORONARY ANGIOPLASTY      ESOPHAGOGASTRODUODENOSCOPY (EGD) N/A 2016    Performed by Gardenia Adamson MD at [facility] ENDO (4TH FLR)    EXCISION TURBINATE, SUBMUCOUS      FUSION, SPINE, LUMBAR, ANTERIOR APPROACH Left L5-S1 Anterior to Psoa Interbody Fusion, L5-S1 Posterior Instrumentation Left 2019    Performed by Mk George MD at [facility] OR 2ND FLR    HAND SURGERY Left     HAND SURGERY Right     torn ligament repair/ Dr. Yeboah    HYSTERECTOMY      Injection,steroid,epidural,transforaminal approach - Bilateral - S1 Bilateral 2018    Performed by Tl Abreu [facility] at [facility] PAIN MGT    Injection-steroid-epidural-caudal N/A 2019    Performed by Dave Bentley Jr., MD at [facility] PAIN MGT    INSERTION-INTRAOCULAR LENS (IOL) Right 2015    Performed by Good Domingo MD at [facility] OR 1ST FLR    left foot surgery      left wrist surgery "      NASAL SEPTUM SURGERY  5/7/15    PHACOEMULSIFICATION-ASPIRATION-CATARACT Right 9/1/2015    Performed by Good Domingo MD at [facility] OR 1ST FLR    REPAIR, URETER  4/12/2019    Performed by Mk George MD at [facility] OR 2ND FLR    RESECTION-TURBINATES (SMR) N/A 5/7/2015    Performed by Dileep Dubois III, MD at [facility] OR 2ND FLR    rt elbow surgery      S/P LAD COATED STENT  05/14/2010    6 total     S/P OM1 STENT  08/2003    SEPTOPLASTY N/A 5/7/2015    Performed by Dileep Dubois III, MD at [facility] OR 2ND FLR    SINUS SURGERY      F.E.S.S.    SINUS SURGERY FUNCTIONAL ENDOSCOPIC WITH NAVIGATION WITH MAXILLARIES, ETHMOIDS, LEFT SPHENOID, LEFT LOLY N/A 5/7/2015    Performed by Dileep Dubois III, MD at [facility] OR 2ND FLR    STENT, URETERAL placement  4/12/2019    Performed by Robin Boyd MD at [facility] OR 2ND FLR    TUBAL LIGATION         Social History     Socioeconomic History    Marital status:      Spouse name: Shamir    Number of children: 2    Years of education: Not on file    Highest education level: Not on file   Occupational History    Occupation: cafeteria     Employer: [employer]     Employer: [employer]     Employer: [employer]   Social Needs    Financial resource strain: Not on file    Food insecurity:     Worry: Not on file     Inability: Not on file    Transportation needs:     Medical: Not on file     Non-medical: Not on file   Tobacco Use    Smoking status: Never Smoker    Smokeless tobacco: Never Used   Substance and Sexual Activity    Alcohol use: No     Alcohol/week: 0.0 oz    Drug use: No    Sexual activity: Yes     Partners: Male     Birth control/protection: Post-menopausal     Comment:    Lifestyle    Physical activity:     Days per week: Not on file     Minutes per session: Not on file    Stress: Not on file   Relationships    Social connections:     Talks on phone: Not on file     Gets together: Not on  file     Attends Jainism service: Not on file     Active member of club or organization: Not on file     Attends meetings of clubs or organizations: Not on file     Relationship status: Not on file   Other Topics Concern    Are you pregnant or think you may be? No    Breast-feeding No   Social History Narrative    . 2 children.        OBJECTIVE:     Vital Signs Range (Last 24H):  Temp:  [38.1 °C (100.6 °F)-39.5 °C (103.1 °F)]   Pulse:  []   Resp:  [16-20]   BP: (100-125)/(51-61)   SpO2:  [94 %-100 %]       Significant Labs:  Lab Results   Component Value Date    WBC 5.01 04/20/2019    HGB 9.3 (L) 04/20/2019    HCT 29.9 (L) 04/20/2019     04/20/2019    CHOL 202 (H) 01/21/2019    TRIG 100 01/21/2019    HDL 48 01/21/2019    ALT 35 04/20/2019    AST 49 (H) 04/20/2019     (L) 04/20/2019    K 4.3 04/20/2019    CL 96 04/20/2019    CREATININE 3.6 (H) 04/20/2019    BUN 66 (H) 04/20/2019    CO2 23 04/20/2019    TSH 1.170 10/22/2018    INR 0.9 04/04/2019    GLUF 217 (H) 09/15/2014    HGBA1C 10.8 (H) 02/19/2019       Diagnostic Studies: No relevant studies.    EKG:   Sinus tachycardia  Otherwise normal ECG  When compared with ECG of 04-APR-2019 11:09,  No significant change was found    PET stress  5/14/19  CONCLUSIONS: NO CLINICALLY SIGNIFICANT STRESS INDUCED PERFUSION DEFECTS as assessed with PET perfusion imaging.  1. There is no evidence of a discrete hemodynamically significant coronary stenosis.   2. Although no clinically significant stress defect is seen, there is reduced coronary flow reserve. These results suggest mild endothelial dysfunction due to elevated cholesterol, high blood pressure, diabetes and mild, diffuse, non-obstructive coronary   atherosclerosis without clinically significant, localized perfusion defects.   3. Resting wall motion is physiologic. Stress wall motion is physiologic.   4. LV function is normal at rest and stress.  (normal is >= 51%)  5. The ventricular volumes  are normal at rest and stress.   6. The extracardiac distribution of radioactivity is normal.   7. When compared to the previous study from 10/21/2013, lateral wall ischemia has improved.      2D ECHO:  Results for orders placed or performed in visit on 02/26/14   2D echo with color flow doppler   Result Value Ref Range    QEF 65     Diastolic Dysfunction Yes          ASSESSMENT/PLAN:         Anesthesia Evaluation    I have reviewed the Patient Summary Reports.     I have reviewed the Medications.     Review of Systems  Anesthesia Hx:  No problems with previous Anesthesia  History of prior surgery of interest to airway management or planning: Denies Family Hx of Anesthesia complications.   Denies Personal Hx of Anesthesia complications.   Hematology/Oncology:     Oncology Normal    -- Denies Anemia:   Cardiovascular:   Hypertension, well controlled Past MI (had MI 2013, went to er and they placed stents a few days later.) CAD asymptomatic CABG/stent   Denies Angina.     Walking severely limited by LBP, not any cardiac symptoms.   Pulmonary:   Denies COPD. Asthma moderate  Denies Shortness of breath. Sleep Apnea (does not tolerate mask)    Renal/:   Denies Chronic Renal Disease.     Neurological:   Denies TIA. Denies CVA. Denies Seizures.    Endocrine:   Diabetes, type 2, using insulin Novalog, trujeo, trulicity       Physical Exam  General:  Well nourished, Obesity Tachypneic, shivering    Airway/Jaw/Neck:  Airway Findings: Mouth Opening: Normal Tongue: Large  Pre-Existing Airway Tube(s): Oral Endotracheal tube  General Airway Assessment: Adult  Mallampati: IV  Improves to III with phonation.  TM Distance: Normal, at least 6 cm  Jaw/Neck Findings:  Neck ROM: Normal ROM  Neck Findings: Normal    Eyes/Ears/Nose:  EYES/EARS/NOSE FINDINGS: Normal   Dental:  Dental Findings: In tact   Chest/Lungs:  Chest/Lungs Findings: Clear to auscultation, Respiratory Distress, Tachypnea     Heart/Vascular:  Heart Findings: Rate:  Tachycardia  Rhythm: Regular Rhythm        Mental Status:  Mental Status Findings:  Confusion         Anesthesia Plan  Type of Anesthesia, risks & benefits discussed:  Anesthesia Type:  general  Patient's Preference:   Intra-op Monitoring Plan: standard ASA monitors, arterial line and central line  Intra-op Monitoring Plan Comments:   Post Op Pain Control Plan: multimodal analgesia, IV/PO Opioids PRN and per primary service following discharge from PACU  Post Op Pain Control Plan Comments:   Induction:   IV  Beta Blocker:  Patient is on a Beta-Blocker and has not received dose within the past 24 hours due to non-compliance or for other reasons (Patient should receive a perioperative dose or document why it is withheld).       Informed Consent: Patient representative understands risks and agrees with Anesthesia plan.  Questions answered. Anesthesia consent signed with patient representative.  ASA Score: 4  emergent   Day of Surgery Review of History & Physical:    H&P update referred to the surgeon.         Ready For Surgery From Anesthesia Perspective.

## 2019-04-21 NOTE — HPI
Ms Huff is a 59F with history of HTN, DM, CAD, NSTEMI, and recent L5-S1 OLIF on 4/12 with intraoperative L ureteral injury repaired by urology with stent that presents to ED with AMS and fevers, concerning for sepsis.  states that he noticed this morning that she was beginning to show signs of altered mental status and developed fevers. She has been ambulating around the house with a walker, no recent changes in numbness/weakness. Back and lateral incisions are clean/dry/intact.

## 2019-04-21 NOTE — SUBJECTIVE & OBJECTIVE
(Not in a hospital admission)    Review of patient's allergies indicates:  No Known Allergies    Past Medical History:   Diagnosis Date    Anticoagulant long-term use     Asthma     Back pain     CAD (coronary artery disease)     s/p stentimg  (2), (1)    Carotid artery stenosis     Diabetes mellitus type 2 in obese     HTN (hypertension), benign     Hyperlipidemia     Keloid cicatrix     NPDR (nonproliferative diabetic retinopathy) 2015    NSTEMI (non-ST elevated myocardial infarction)     Nuclear sclerosis - Right Eye 3/18/2014    Primary localized osteoarthrosis, lower leg 2014    Sleep apnea      Past Surgical History:   Procedure Laterality Date    CARDIAC CATHETERIZATION      cataract extraction left eye      cataracts      CATHETERIZATION, HEART, LEFT Left 2014    Performed by Wilman Kim MD at [facility] CATH LAB     SECTION, LOW TRANSVERSE      CORONARY ANGIOPLASTY      ESOPHAGOGASTRODUODENOSCOPY (EGD) N/A 2016    Performed by Gardenia Adamson MD at [facility] ENDO (4TH FLR)    EXCISION TURBINATE, SUBMUCOUS      FUSION, SPINE, LUMBAR, ANTERIOR APPROACH Left L5-S1 Anterior to Psoa Interbody Fusion, L5-S1 Posterior Instrumentation Left 2019    Performed by Mk George MD at [facility] OR 2ND FLR    HAND SURGERY Left     HAND SURGERY Right     torn ligament repair/ Dr. Yeboah    HYSTERECTOMY      Injection,steroid,epidural,transforaminal approach - Bilateral - S1 Bilateral 2018    Performed by Tl Abreu MD at [facility] PAIN MGT    Injection-steroid-epidural-caudal N/A 2019    Performed by Dave Bentley Jr., MD at [facility] PAIN MGT    INSERTION-INTRAOCULAR LENS (IOL) Right 2015    Performed by Good Domingo MD at [facility] OR 1ST FLR    left foot surgery      left wrist surgery      NASAL SEPTUM SURGERY  5/7/15    PHACOEMULSIFICATION-ASPIRATION-CATARACT Right 2015    Performed by Good Domingo MD at [facility]  OR 1ST FLR    REPAIR, URETER  4/12/2019    Performed by Mk George MD at [facility] OR 2ND FLR    RESECTION-TURBINATES (SMR) N/A 5/7/2015    Performed by Dileep Dubois III, MD at [facility] OR 2ND FLR    rt elbow surgery      S/P LAD COATED STENT  05/14/2010    6 total     S/P OM1 STENT  08/2003    SEPTOPLASTY N/A 5/7/2015    Performed by Dileep Dubois III, MD at [facility] OR 2ND FLR    SINUS SURGERY      F.E.S.S.    SINUS SURGERY FUNCTIONAL ENDOSCOPIC WITH NAVIGATION WITH MAXILLARIES, ETHMOIDS, LEFT SPHENOID, LEFT LOLY N/A 5/7/2015    Performed by Dileep Dubois III, MD at [facility] OR 2ND FLR    STENT, URETERAL placement  4/12/2019    Performed by Robin Boyd MD at [facility] OR [facility] FLR    TUBAL LIGATION       Family History     Problem Relation (Age of Onset)    Diabetes Mother, Father, Sister, Brother, Sister    Heart attack Father    Heart disease Mother    Leukemia Father    No Known Problems Sister, Brother, Brother, Maternal Grandmother, Maternal Grandfather, Paternal Grandmother, Paternal Grandfather, Son, Son, Maternal Aunt, Maternal Uncle, Paternal Aunt, Paternal Uncle        Tobacco Use    Smoking status: Never Smoker    Smokeless tobacco: Never Used   Substance and Sexual Activity    Alcohol use: No     Alcohol/week: 0.0 oz    Drug use: No    Sexual activity: Yes     Partners: Male     Birth control/protection: Post-menopausal     Comment:      Review of Systems   Unable to perform ROS: Mental status change     Objective:     Weight: 72.6 kg (160 lb)  Body mass index is 27.46 kg/m².  Vital Signs (Most Recent):  Temp: (!) 103 °F (39.4 °C) (04/20/19 1843)  Pulse: 109 (04/20/19 1935)  Resp: 16 (04/20/19 1901)  BP: (!) 103/51 (04/20/19 1935)  SpO2: (!) 94 % (04/20/19 1935) Vital Signs (24h Range):  Temp:  [100.6 °F (38.1 °C)-103 °F (39.4 °C)] 103 °F (39.4 °C)  Pulse:  [104-123] 109  Resp:  [16-20] 16  SpO2:  [94 %-97 %] 94 %  BP: (103-125)/(51-61) 103/51     Date 04/20/19 0700 - 04/21/19 0659    Shift 6368-2303 6409-4963 7609-8004 24 Hour Total   INTAKE   Shift Total(mL/kg)       OUTPUT   Urine  1650  1650   Shift Total(mL/kg)  1650(22.7)  1650(22.7)   Weight (kg)  72.6 72.6 72.6                        Closed/Suction Drain 04/12/19 2115 Left Other (Comment) Bulb 10 Fr. (Active)            Urethral Catheter 04/12/19 1650 Non-latex;Straight-tip (Active)            Urethral Catheter 04/20/19 1711 Latex 16 Fr. (Active)   Output (mL) 900 mL 4/20/2019  5:00 PM       Neurosurgery Physical Exam  -drowsy, oriented x2  -PERRL, EOMI, face symmetrical, tongue midline, facial sensation symmetric, shoulder shrug full strength and symmetric  -Motor: 5/5 throughout the upper and lower extremites bilaterally  -sensation intact to light touch throughout  -back and lateral incisions are c/d/i  -no back pain to palpation      Significant Labs:  Recent Labs   Lab 04/20/19 1710   *   *   K 4.3   CL 96   CO2 23   BUN 66*   CREATININE 3.6*   CALCIUM 10.0   MG 2.3     Recent Labs   Lab 04/20/19 1710   WBC 5.01   HGB 9.3*   HCT 29.9*        No results for input(s): LABPT, INR, APTT in the last 48 hours.  Microbiology Results (last 7 days)     Procedure Component Value Units Date/Time    Influenza A & B by Molecular [[id number]] Collected:  04/20/19 1713    Order Status:  Completed Specimen:  Nasopharyngeal Swab Updated:  04/20/19 1804     Influenza A, Molecular Negative     Influenza B, Molecular Negative     Flu A & B Source Nasal swab    Urine culture [[id number]] Collected:  04/20/19 1711    Order Status:  No result Specimen:  Urine Updated:  04/20/19 1745    Blood culture x two cultures. Draw prior to antibiotics. [[id number]] Collected:  04/20/19 1711    Order Status:  Sent Specimen:  Blood from Peripheral, Antecubital, Right Updated:  04/20/19 1723    Blood culture x two cultures. Draw prior to antibiotics. [[id number]] Collected:  04/20/19 1705    Order Status:  Sent Specimen:  Blood from Peripheral,  Antecubital, Right Updated:  04/20/19 1719        CRP:   Recent Labs   Lab 04/20/19 1710   .8*     ESR: No results for input(s): POCESR, ERYTHROCYTES in the last 48 hours.  Recent Lab Results       04/20/19  1713   04/20/19  1711 04/20/19  1710        Influenza A, Molecular Negative         Influenza B, Molecular Negative         Albumin     2.8     Alkaline Phosphatase     171     ALT     35     Anion Gap     15     Aniso     Slight     Appearance, UA   Cloudy       AST     49     Bacteria, UA   Many       Baso #     0.01     Basophil%     0.2     Bilirubin (UA)   Negative       BILIRUBIN TOTAL     1.5  Comment:  For infants and newborns, interpretation of results should be based  on gestational age, weight and in agreement with clinical  observations.  Premature Infant recommended reference ranges:  Up to 24 hours.............<8.0 mg/dL  Up to 48 hours............<12.0 mg/dL  3-5 days..................<15.0 mg/dL  6-29 days.................<15.0 mg/dL       BUN, Bld     66     Calcium     10.0     Chloride     96     CO2     23     Color, UA   Yellow       Creatinine     3.6     CRP     336.8     Differential Method     Automated     Dohle Bodies     Present     eGFR if      15.3     eGFR if non      13.2  Comment:  Calculation used to obtain the estimated glomerular filtration  rate (eGFR) is the CKD-EPI equation.        Eos #     0.0     Eosinophil%     0.0     Flu A & B Source Nasal swab         Glucose     376     Glucose, UA   3+       Gran # (ANC)     4.7     Gran%     93.4     Hematocrit     29.9     Hemoglobin     9.3     Hyaline Casts, UA   0       Immature Grans (Abs)     0.02  Comment:  Mild elevation in immature granulocytes is non specific and   can be seen in a variety of conditions including stress response,   acute inflammation, trauma and pregnancy. Correlation with other   laboratory and clinical findings is essential.       Immature Granulocytes     0.4      Ketones, UA   Negative       Lactate, Duane     4.6  Comment:  Falsely low lactic acid results can be found in samples   containing >=13.0 mg/dL total bilirubin and/or >=3.5 mg/dL   direct bilirubin.  *Critical value -   Results called to and read back by:Nguyen Omalley RN.       Leukocytes, UA   3+       Lymph #     0.3     Lymph%     5.0     Magnesium     2.3     MCH     26.4     MCHC     31.1     MCV     85     Microscopic Comment   SEE COMMENT  Comment:  Other formed elements not mentioned in the report are not   present in the microscopic examination.          Mono #     0.1     Mono%     1.0     MPV     10.2     Nitrite, UA   Negative       nRBC     0     Occult Blood UA   3+       pH, UA   5.0       Platelet Estimate     Appears normal     Platelets     299     Poly     Occasional     Potassium     4.3     PROTEIN TOTAL     7.8     Protein, UA   1+  Comment:  Recommend a 24 hour urine protein or a urine   protein/creatinine ratio if globulin induced proteinuria is  clinically suspected.         RBC     3.52     RBC, UA   3       RDW     12.8     Sed Rate     99     Sodium     134     Specific [place], UA   1.015       Specimen UA   Urine, Catheterized       Squam Epithel, UA   1       Toxic Granulation     Present     WBC, UA   >100       WBC     5.01     Yeast, UA   None             Significant Diagnostics:  I have reviewed all pertinent imaging results/findings within the past 24 hours.

## 2019-04-21 NOTE — HPI
PM of 4/20- [facility] TRISTIN Huff is a 59 y.o. female with history of HTN, type 2 diabetes mellitus, CAD, NSTEMI, and back pain who presents to [facility] with altered mental status and sepsis. She underwent L5-S1 OLIF with NSGY on 4/12 and had intraoperative left ureteral injury with ureteroureteral anastomosis and ureteral stent placement. She did well initially and had correa and MADHURI drain removed on 4/16. She began having chills and altered mental status 2 days ago and this has progressively worsened. No complaints of pain.      She is altered and HPI is limited. In the ED she is febrile to 103 and tachycardic and pressures are low normal. WBC is 5, creatinine is 3.6 baseline 1.0, lactate is 4.6. Cath UA concerning for infection, 3+ LE, >100 WBCs, and many bacteria on microscopy.     CT and MRI abdomen both show air with minimal fluid near the surgical site with left ureter coursing through. There is air throughout the proximal collecting system which is decompressed with JJ ureteral stent in good position.    4/21- Decision was made to take pt to surgery, had an ex-lap with urology on 4/21 where they noted breakdown of ureter over the stent site.  L ureter was clipped and MADHURI drain left over the site.  Fascia and skin were closed.

## 2019-04-21 NOTE — PROGRESS NOTES
Pt arrived back to SICU intubated with 7.5 ETT secured 23 cm at the lips. Pt placed on  ventilator upon arrival. SpO2 100% and pt tolerating well. Will continue to monitor closely.

## 2019-04-21 NOTE — OP NOTE
[facility]  Operative Note    Date: 04/21/2019    Pre-Op Diagnosis:   1. Hx of left ureteral injury  2. Severe sepsis  3. Respiratory failure  4. Intraabdominal abscess     Patient Active Problem List   Diagnosis    Hypertension associated with diabetes    Hyperlipidemia    CAD (coronary artery disease)    Sleep apnea    Carotid stenosis, bilateral    Non compliance with medical treatment    Coronary stent restenosis    S/P coronary artery stent placement    Nuclear sclerosis - Right Eye    Primary localized osteoarthrosis, lower leg    Chronic sinusitis    PSC (posterior subcapsular cataract), right    DME (diabetic macular edema)    Refractive error    Pseudophakia    Senile nuclear sclerosis    Type 2 diabetes mellitus with diabetic peripheral angiopathy without gangrene    Diabetic peripheral neuropathy associated with type 2 diabetes mellitus    Cervical arthritis    Neurogenic claudication    Lumbar herniated disc    Fatty liver disease, nonalcoholic    Arthritis of lumbar spine    Chronic pain    Lumbar radiculopathy    Lumbosacral radiculopathy at L5    Ureteral transection of left native kidney    Ureteral stent retained    Sepsis    Encephalopathy    Type 2 diabetes mellitus, with long-term current use of insulin    HLD (hyperlipidemia)    Asthma    Essential hypertension       Post-Op Diagnosis: same    Procedure(s) Performed:   1. Exploratory laparotomy  2. Ligation of left ureter  3. Removal of left JJ ureteral stent      Specimen(s): intraabdominal culture     Staff Surgeon: Paul Carlson MD    Assistant Surgeon: Yulissa Salas MD; Hubert Higuera MD    Anesthesia: General endotracheal anesthesia    Indications: Mariann Huff is a 59 y.o. female who initially sustained a left ureteral injury during L5/S1 fusion on 4/12/19. She underwent a ureteroureterostomy with stent placement at that time. She presented to the ER today with AMS, fever and  respiratory distress. On imaging she was found to have a large amount of air and fluid within the retroperitoneal and within the left collecting system. This was not amenable to drainage by IR therefore she was taken to the OR for washout.     Findings:   - left retroperitoneum developed up to lower pole of the left kidney  - pockets of purulence identified and evacuated, cultures sent  - left ureteral anastomosis found to be completely broken down with stent clearly visible  - posterior to the ureter, spinal hardware was visibly exposed  - proximal end of the ureter clipped and stent removed as tissue felt to be unsuitable for repair  - no bowel injury noted    Estimated Blood Loss: min    Drains: 15 Fr MADHURI drain    Procedure in detail:  After appropriate consents were obtained, the patient was prepped and draped in sterile manner. Preop abx were confirmed. CRISTINA/SCDs were applied and working.     A midline incision was made from the umbilicus to just above the pubic bone. This was carried down through the subcutaneous tissue until we came down to the fascia. At this point, we stayed in the midline splitting the fascia open and identifying the rectus muscles. The space of Retzius was entered, and we took the fascia down up to just below the umbilicus. The peritoneum was entered. There was a small amount of adhesions present which were carefully lysed with mets.     Blunt finger dissection with used to separate the left lateral wall of the bladder from the pelvic sidewall. This plane was carried up the left sided retroperitoneum all the way to the lower pole of the left kidney. There was pockets of purulence noted and evacuated, this was sent for culture.     Overlying the iliac vessels there was noted to be complete breakdown of the previous ureteral anastomosis. The stent was clearly visible. Posterior to this there was a pocket with purulent fluid and exposed hardware along the spinal vertebrae. The tissue along the  distal and proximal ends of the ureter was noted to be very friable and unhealthy. For this reason plus the presence of gross purulence, the decision was made to clip the ureter and plan for nephrostomy tube placement. The stent was removed and several metal clips were placed on the proximal end of the ureter.     A 15 Fr MADHURI drain was then placed up into the left retroperitoneum as well as into the pocket anterior to the spine. This was secured to the skin using a 2-0 nylon.     The fascia was closed using 0 looped PDS in a running fashion. The subcutaneous tissues were closed using 2-0 vicryl in a running fashion.  The skin was closed using 4-0 monocryl in a running subcuticular fashion.  Dermabond was applied.      Plan: Patient will be admitted to SICU for monitoring. She will undergo urgent left nephrostomy tube placement.       Yulissa Salas MD

## 2019-04-21 NOTE — PROGRESS NOTES
Pt in  for nephrostomy. Pt transported from CVICU to IR in care of CRNA as pt in intubated and procedure will be done under anesthesia. CRNA remains at bedside.

## 2019-04-21 NOTE — TRANSFER OF CARE
"Anesthesia Transfer of Care Note    Patient: Mariann Huff    Procedure(s) Performed: Procedure(s) (LRB):  LAPAROTOMY, EXPLORATORY; LIGATION OF LEFT URETER; DOUBLE J STENT REMOVAL LEFT URETER (Left)    Patient location: ICU    Anesthesia Type: general    Transport from OR: Transported from OR intubated on 100% O2 by AMBU with adequate controlled ventilation. Upon arrival to PACU/ICU, patient attached to ventilator and auscultated to confirm bilateral breath sounds and adequate TV. Continuous ECG monitoring in transport. Continuous SpO2 monitoring in transport. Continuos invasive BP monitoring in transport    Post pain: adequate analgesia    Post assessment: no apparent anesthetic complications    Post vital signs: stable    Level of consciousness: sedated    Nausea/Vomiting: no nausea/vomiting    Complications: none    Transfer of care protocol was followed      Last vitals:   Visit Vitals  BP (!) 91/51 (BP Location: Left arm, Patient Position: Lying)   Pulse 68   Temp 36.9 °C (98.4 °F) (Oral)   Resp 17   Ht 5' 4" (1.626 m)   Wt 72.6 kg (160 lb)   SpO2 98%   Breastfeeding? No   BMI 27.46 kg/m²     "

## 2019-04-21 NOTE — PROGRESS NOTES
Received intubated pt from OR. 7.5 ETT secured at 23cm at the lip. Placed on mechanical ventilation at documented settings. Pt tolerated well. Will continue monitor.

## 2019-04-21 NOTE — ANESTHESIA POSTPROCEDURE EVALUATION
Anesthesia Post Evaluation    Patient: Mariann Huff    Procedure(s) Performed: Procedure(s) (LRB):  LAPAROTOMY, EXPLORATORY; LIGATION OF LEFT URETER; DOUBLE J STENT REMOVAL LEFT URETER (Left)    Final Anesthesia Type: general  Patient location during evaluation: ICU  Patient participation: No - Unable to Participate, Intubation  Level of consciousness: awake and alert and sedated  Post-procedure vital signs: reviewed and stable (on pressors)  Pain management: adequate  Airway patency: patent  PONV status at discharge: No PONV  Anesthetic complications: no      Cardiovascular status: blood pressure returned to baseline and hemodynamically unstable (on pressors)  Respiratory status: intubated and ventilator  Hydration status: euvolemic  Follow-up not needed.          Vitals Value Taken Time   /54 4/21/2019  3:32 AM   Temp 36.9 °C (98.4 °F) 4/21/2019  2:50 AM   Pulse 72 4/21/2019  3:52 AM   Resp 20 4/21/2019  3:52 AM   SpO2 99 % 4/21/2019  3:52 AM   Vitals shown include unvalidated device data.      No case tracking events are documented in the log.      Pain/Derick Score: Pain Rating Prior to Med Admin: 0 (4/20/2019  5:49 PM)

## 2019-04-21 NOTE — ASSESSMENT & PLAN NOTE
ASSESSMENT/PLAN:   Mariann Huff is a 59 y.o. female s/p ex-lap with transection of L ureter (4/21)    Neuro:   - Sedated with propofol  - Pain control with fentanyl    Pulmonary:   - Intubated    Vent Mode: SIMV  Oxygen Concentration (%):  [60] 60  Resp Rate Total:  [16 br/min] 16 br/min  Vt Set:  [550 mL] 550 mL  PEEP/CPAP:  [5 cmH20] 5 cmH20  Pressure Support:  [10 cmH20] 10 cmH20  Mean Airway Pressure:  [14 cmH20] 14 cmH20  -Regular ABGs while pt remains acidotic    Cardiac:   - On Levo (0.2) right now, wean as tolerated  - Also will be getting product and volume  - HR normal at this time    Renal:    - BUN/Cr elevated at 66/3.6 pre-operatively  - s/p transection of left ureter  - Plan for urgent IR nephrostomy tube placement    ID:   - On Zosyn  - Blood cultures showing gram neg rods at this time.    Hem/Onc:   - Hgb 6 post-op  -transfuse 2 units    Endocrine:    - Insulin gtt    Fluids/Electrolytes/Nutrition/GI:   - Replace electrolytes PRN  - NPO  - Will trend lactates and bolus accordingly    PPx:  - DVT: None until after IR treatment and can ensure pt not actively bleeding.      DISPO:  - Continue SICU care

## 2019-04-21 NOTE — ASSESSMENT & PLAN NOTE
Mariann TRISTIN Huff is a 59 y.o. female s/p left ureteral injury on 4/12, presented with fever, AMS, and intraabdominal abscess, taken for washout and ligation of left ureter with neph tube placement on 4/21    - Management per SICU, appreciate assistance   - Continue broad spectrum abx, follow up intraop cultures   - Transfusion PRBC this am for low H/H, suspect dilutional since arrival  - Wean pressors as tolerated  - Ok with fluids as necessary  - Maintain correa and neph tube at this time  - Vent per SICU

## 2019-04-21 NOTE — ANESTHESIA POSTPROCEDURE EVALUATION
Anesthesia Post Evaluation    Patient: Mariann Huff    Procedure(s) Performed: Procedure(s) (LRB):  Creation, Nephrostomy, Percutaneous (Left)    Final Anesthesia Type: general  Patient location during evaluation: ICU  Patient participation: No - Unable to Participate, Intubation  Level of consciousness: lethargic  Post-procedure vital signs: reviewed and stable (on 2 pressors)  Pain management: adequate  Airway patency: patent  PONV status at discharge: No PONV  Anesthetic complications: no      Cardiovascular status: blood pressure returned to baseline (with pressors)  Respiratory status: unassisted, intubated and ventilator  Hydration status: euvolemic  Follow-up not needed.          Vitals Value Taken Time   /69 4/21/2019  9:01 AM   Temp 36.5 °C (97.7 °F) 4/21/2019  7:26 AM   Pulse 86 4/21/2019  9:49 AM   Resp 20 4/21/2019  9:49 AM   SpO2 100 % 4/21/2019  9:29 AM   Vitals shown include unvalidated device data.      No case tracking events are documented in the log.      Pain/Derick Score: Pain Rating Prior to Med Admin: 0 (4/20/2019  5:49 PM)

## 2019-04-21 NOTE — H&P
[facility]  Critical Care Medicine  History & Physical    Patient Name: Mariann Huff  MRN: [id number]  Admission Date: 4/20/2019  Hospital Length of Stay: 0 days  Code Status: Full Code  Attending Physician: Paul Molina MD   Primary Care Provider: Jasbir Haney MD   Principal Problem: <principal problem not specified>    Subjective:     HPI:  59 yro F with PMH of HTN, IDDM2 (AIC 10.8 2/2019), CAD s/p stenting 2014, MURTAZA, and chronic lower back pain s/p left L5-S1 anterior to psoa interbody fusion who presents to the ED 4/20/19 with the complaint of confusion and fevers. Patient underwent L5-S1 fusion 4/12/19 with Dr George in [facility] c/b L ureter transection, repaired with end to end ureteroureterostomy stent placement by Dr Boyd in urology. Tentative plan was for stent removal in June. She also had a MADHURI drain placed during the procedure, which was removed during this prior admission.  Patient's  states her confusion started this AM and has been worsening throughout the day. He states she has pain in her R thigh and calf, present prior to her surgery. He did not measure her temperature at home, but she was subjectively febrile. Denies SOB, CP, abdominal pain, decreased UO, suprapubic pain, flank pain.     On presentation to the ED, patient was hypotensive with BP's 100s/50s and febrile with T max of 103. She received 30cc/kg of NS and was started on vanc and zosyn. CT a/p and MRI L spine were completed. Imaging showed air in the L5-S1 post-operative bed and air in the L ureter, possibly communication between post-op bed and ureter. UA showed >100 WBCs and 3+ leuks.     Hospital/ICU Course:  Admitted to MICU 4/20/19 for sepsis possibly 2/2 abscess and UTI     Past Medical History:   Diagnosis Date    Anticoagulant long-term use     Asthma     Back pain     CAD (coronary artery disease)     s/p stentimg 2003 (2),2009 (1)    Carotid artery stenosis     Diabetes mellitus type 2 in obese      HTN (hypertension), benign     Hyperlipidemia     Keloid cicatrix     NPDR (nonproliferative diabetic retinopathy) 2015    NSTEMI (non-ST elevated myocardial infarction)     Nuclear sclerosis - Right Eye 3/18/2014    Primary localized osteoarthrosis, lower leg 2014    Sleep apnea        Past Surgical History:   Procedure Laterality Date    CARDIAC CATHETERIZATION      cataract extraction left eye      cataracts      CATHETERIZATION, HEART, LEFT Left 2014    Performed by Wilman Kim MD at [facility] CATH LAB     SECTION, LOW TRANSVERSE      CORONARY ANGIOPLASTY      ESOPHAGOGASTRODUODENOSCOPY (EGD) N/A 2016    Performed by Gardenia Adamson MD at [facility] ENDO (4TH FLR)    EXCISION TURBINATE, SUBMUCOUS      FUSION, SPINE, LUMBAR, ANTERIOR APPROACH Left L5-S1 Anterior to Psoa Interbody Fusion, L5-S1 Posterior Instrumentation Left 2019    Performed by Mk George MD at [facility] OR 2ND FLR    HAND SURGERY Left     HAND SURGERY Right     torn ligament repair/ Dr. Yeboah    HYSTERECTOMY      Injection,steroid,epidural,transforaminal approach - Bilateral - S1 Bilateral 2018    Performed by Tl [facility]breu [facility] at [facility] PAIN MGT    Injection-steroid-epidural-caudal N/A 2019    Performed by Dave Bentley Jr., MD at [facility] PAIN MGT    INSERTION-INTRAOCULAR LENS (IOL) Right 2015    Performed by Good Domingo MD at [facility] OR 1ST FLR    left foot surgery      left wrist surgery      NASAL SEPTUM SURGERY  5/7/15    PHACOEMULSIFICATION-ASPIRATION-CATARACT Right 2015    Performed by Good Domingo MD at [facility] OR 1ST FLR    REPAIR, URETER  2019    Performed by Mk George MD at [facility] OR 2ND FLR    RESECTION-TURBINATES (SMR) N/A 2015    Performed by Dileep Dubois III, MD at [facility] OR 2ND FLR    rt elbow surgery      S/P LAD COATED STENT  2010    6 total     S/P OM1 STENT  2003    SEPTOPLASTY N/A 2015     Performed by Dileep [facility]ois III, MD at [facility] OR 2ND FLR    SINUS SURGERY      F.JIMI.S.S.    SINUS SURGERY FUNCTIONAL ENDOSCOPIC WITH NAVIGATION WITH MAXILLARIES, ETHMOIDS, LEFT SPHENOID, LEFT LOLY N/A 5/7/2015    Performed by Dileep Dubois III, MD at [facility] OR 2ND FLR    STENT, URETERAL placement  4/12/2019    Performed by Robin Boyd MD at [facility] OR 2ND FLR    TUBAL LIGATION         Review of patient's allergies indicates:  No Known Allergies    Family History     Problem Relation (Age of Onset)    Diabetes Mother, Father, Sister, Brother, Sister    Heart attack Father    Heart disease Mother    Leukemia Father    No Known Problems Sister, Brother, Brother, Maternal Grandmother, Maternal Grandfather, Paternal Grandmother, Paternal Grandfather, Son, Son, Maternal Aunt, Maternal Uncle, Paternal Aunt, Paternal Uncle        Tobacco Use    Smoking status: Never Smoker    Smokeless tobacco: Never Used   Substance and Sexual Activity    Alcohol use: No     Alcohol/week: 0.0 oz    Drug use: No    Sexual activity: Yes     Partners: Male     Birth control/protection: Post-menopausal     Comment:       Review of Systems   Constitutional: Positive for fever. Negative for chills.   HENT: Negative for congestion and rhinorrhea.    Eyes: Negative for photophobia and visual disturbance.   Respiratory: Negative for cough and shortness of breath.    Cardiovascular: Negative for chest pain, palpitations and leg swelling.   Gastrointestinal: Negative for abdominal pain, constipation and diarrhea.   Endocrine: Negative for cold intolerance and heat intolerance.   Genitourinary: Negative for dysuria and hematuria.   Musculoskeletal: Positive for back pain. Negative for arthralgias and myalgias.   Skin: Negative for pallor and rash.   Neurological: Negative for light-headedness and headaches.   Hematological: Negative for adenopathy. Does not bruise/bleed easily.   Psychiatric/Behavioral: Positive for confusion.      Objective:     Vital Signs (Most Recent):  Temp: (!) 102.8 °F (39.3 °C)(Critical care team at bedside and aware) (04/20/19 2057)  Pulse: 102 (04/20/19 2141)  Resp: 18 (04/20/19 2057)  BP: (!) 100/53 (04/20/19 2057)  SpO2: 98 % (04/20/19 2141) Vital Signs (24h Range):  Temp:  [100.6 °F (38.1 °C)-103 °F (39.4 °C)] 102.8 °F (39.3 °C)  Pulse:  [] 102  Resp:  [16-20] 18  SpO2:  [94 %-98 %] 98 %  BP: (100-125)/(51-61) 100/53   Weight: 72.6 kg (160 lb)  Body mass index is 27.46 kg/m².      Intake/Output Summary (Last 24 hours) at 4/20/2019 2205  Last data filed at 4/20/2019 1836  Gross per 24 hour   Intake --   Output 1650 ml   Net -1650 ml       Physical Exam   Constitutional: She appears well-developed and well-nourished.   HENT:   Head: Normocephalic and atraumatic.   Eyes: Pupils are equal, round, and reactive to light. EOM are normal.   Neck: Neck supple. No thyromegaly present.   Cardiovascular: Normal rate, regular rhythm and normal heart sounds.   No murmur heard.  Pulmonary/Chest: Effort normal. No respiratory distress. She has no wheezes. She has no rales.   Decreased air movement   Abdominal: Soft. Bowel sounds are normal. She exhibits no distension. There is no tenderness.   Musculoskeletal: She exhibits no edema, tenderness or deformity.   Lymphadenopathy:     She has no cervical adenopathy.   Neurological: She is alert. No cranial nerve deficit.   AOx2   Skin: Skin is warm and dry.   Psychiatric: Her behavior is normal.       Vents:     Lines/Drains/Airways     Drain                 Closed/Suction Drain 04/12/19 2115 Left Other (Comment) Bulb 10 Fr. 8 days         Urethral Catheter 04/12/19 1650 Non-latex;Straight-tip 8 days         Urethral Catheter 04/20/19 1711 Latex 16 Fr. less than 1 day          Peripheral Intravenous Line                 Peripheral IV - Single Lumen 09/25/18 1355 Left Forearm 207 days         Peripheral IV - Single Lumen 01/10/19 0917 Left Antecubital 100 days          Peripheral IV - Single Lumen 04/12/19 1135 20 G Left Forearm 8 days         Peripheral IV - Single Lumen 04/12/19 1627 16 G Right Wrist 8 days         Peripheral IV - Single Lumen 04/20/19 1650 18 G Left Antecubital less than 1 day         Peripheral IV - Single Lumen 04/20/19 1710 18 G Right Antecubital less than 1 day              Significant Labs:    CBC/Anemia Profile:  Recent Labs   Lab 04/20/19  1710   WBC 5.01   HGB 9.3*   HCT 29.9*      MCV 85   RDW 12.8        Chemistries:  Recent Labs   Lab 04/20/19  1710   *   K 4.3   CL 96   CO2 23   BUN 66*   CREATININE 3.6*   CALCIUM 10.0   ALBUMIN 2.8*   PROT 7.8   BILITOT 1.5*   ALKPHOS 171*   ALT 35   AST 49*   MG 2.3       Significant Imaging: I have reviewed all pertinent imaging results/findings within the past 24 hours.    Assessment/Plan:     Neuro  Encephalopathy  -likely in setting of infection, not due to bicarb retention- ABG shows metabolic acidosis with respiratory compensation    Pulmonary  Asthma  -PRN duonebs    Cardiac/Vascular  Essential hypertension  -holding home meds in setting of sepsis: irbesartan 300 mg, toprol , amlodipine 10, chlorthalidone 50 mg    HLD (hyperlipidemia)  -home lipitor, will resume with not NPO    CAD (coronary artery disease)  -hx of NSTEMI, s/p stenting per  2014  -will hold home prasugrel     ID  Sepsis  59 yro F with PMH of HTN, IDDM2 (AIC 10.8 2/2019) and chronic lower back pain s/p left L5-S1 anterior to psoa interbody fusion who presents to the ED 4/20/19 with the complaint of confusion and fevers. Patient underwent L5-S1 fusion 4/12/19 with Dr George in [facility] c/b L ureter transection, repaired with end to end ureteroureterostomy stent placement by Dr Boyd in urology.    -In ED, BP's 100s/50s, T max of 103, LA 4.6, WBC WNL's  -received 30cc/kg of NS and was started on vanc and zosyn  -CT a/p and MRI L spine 4/20- air in the L5-S1 post-operative bed and air in the L ureter, possibly communication  between post-op bed and ureter, concerning for abscess/ fluid collection  -UA showed >100 WBCs and 3+ leuks.   -continue broad spectrum abx coverage with vanc and zosyn  -will trend LA  -BCx and UCx pending  -[facility] and urology consulted- per ED physician discussion with [facility], fluid collection/abscess/air coarses near surgical bed, but more likely related to ureter  -Per urology and IR, air in ureter/surgical bed cannot be accessed by IR, so general surgery will assess patient  -patient originally admitted to MICU, planning to take patient to OR tonight and will be transferred to SICU post-operatively     Endocrine  Type 2 diabetes mellitus, with long-term current use of insulin  -patient with BG >370 on admission  -home regimen: aspart 35, 45, 35 with meals and long acting 50 BID  -A1c 10's in Feb  -will give aspart 10 units and recheck BG, considering insulin ggt if remains hyperglycemic  -patient NPO for possible surgical procedure     Orthopedic  Ureteral transection of left native kidney  -s/p end to end ureteroureterostomy and stent placement by Dr Boyd in urology 4/12      Arthritis of lumbar spine  -s/p left L5-S1 anterior to psoa interbody fusion Dr George in [facility]      Critical secondary to Patient has a condition that poses threat to life and bodily function: sepsis     Critical care was time spent personally by me on the following activities: development of treatment plan with patient or surrogate and bedside caregivers, discussions with consultants, evaluation of patient's response to treatment, examination of patient, ordering and performing treatments and interventions, ordering and review of laboratory studies, ordering and review of radiographic studies, pulse oximetry, re-evaluation of patient's condition. This critical care time did not overlap with that of any other provider or involve time for any procedures.     Carine Shea MD  Critical Care Medicine  [facility]

## 2019-04-21 NOTE — PROGRESS NOTES
Procedure complete. Pt tolerated well. 8Fr nephrostomy in left flank. Site clean, dry, intact, no bleeding, no hematoma. Pt remains in the care of CRNA. Pt to return to CVICU room in bed in care of CRNA and RN. Report will be given to RN at bedside.

## 2019-04-21 NOTE — ANESTHESIA PROCEDURE NOTES
Central Line    Diagnosis: abdominal sepsis  Patient location during procedure: done in OR  Procedure start time: 4/21/2019 1:00 AM  Timeout: 4/21/2019 12:59 AM  Procedure end time: 4/21/2019 1:39 AM  Staffing  Anesthesiologist: Angel Castillo MD  Performed: anesthesiologist   Anesthesiologist was present at the time of the procedure.  Preanesthetic Checklist  Completed: patient identified, site marked, surgical consent, pre-op evaluation, timeout performed, IV checked, risks and benefits discussed, monitors and equipment checked and anesthesia consent given  Indication   Indication: vascular access, med administration     Anesthesia   general anesthesia    Central Line   Skin Prep: skin prepped with ChloraPrep, skin prep agent completely dried prior to procedure  maximum sterile barriers used during central venous catheter insertion  hand hygiene performed prior to central venous catheter insertion  Location: right, internal jugular.   Catheter type: triple lumen  Catheter Size: 7 Fr  Inserted Catheter Length: 15 cm  Ultrasound: vascular probe with ultrasound  Vessel Caliber: large, patent  Vascular Doppler:  not done, compressibility normal  Needle advanced into vessel with real time Ultrasound guidance.  Image recorded and saved.   Manometry: Venous cannualation confirmed by visual estimation of blood vessel pressure using manometry.  Insertion Attempts: 4   Securement:line sutured, chlorhexidine patch, sterile dressing applied and blood return through all ports    Post-Procedure   Adverse Events:arterial cannulation (arterial stick with small gauge catheter; recognized; 10 min manual pressure applies)    Guidewire Guidewire removed intact.

## 2019-04-21 NOTE — ANESTHESIA PROCEDURE NOTES
Arterial    Diagnosis: abdominal sepsis    Patient location during procedure: done in OR  Procedure start time: 4/21/2019 12:31 AM  Timeout: 4/21/2019 12:29 AM  Procedure end time: 4/21/2019 12:43 AM  Staffing  Resident/CRNA: Aime Che CRNA  Performed: resident/CRNA   Anesthesiologist was present at the time of the procedure.  Preanesthetic Checklist  Completed: patient identified, site marked, surgical consent, pre-op evaluation, timeout performed, IV checked, risks and benefits discussed, monitors and equipment checked and anesthesia consent givenArterial  Skin Prep: chlorhexidine gluconate  Local Infiltration: none  Orientation: left  Location: radial  Catheter Size: 20 G  Catheter placement by Anatomical landmarks. Heme positive aspiration all ports.Insertion Attempts: 3  Assessment  Dressing: secured with tape and tegaderm  Patient: Tolerated well

## 2019-04-21 NOTE — ED NOTES
Patient identifiers verified and correct for Mariann Huff.    LOC: The patient is awake, slightly lethargic and aware of environment with an appropriate affect, the patient is oriented x 3 and speaking appropriately with intermittent slight delay in responses  APPEARANCE: Patient resting comfortably and in no acute distress, patient is clean and well groomed, patient's clothing is properly fastened.  SKIN: The skin is warm and dry, color consistent with ethnicity, patient has normal skin turgor and moist mucus membranes, skin intact, no breakdown or bruising noted. Pt's incision site is intact and clean  MUSCULOSKELETAL: Patient moving all extremities spontaneously, no obvious swelling or deformities noted.  RESPIRATORY: Airway is open and patent, respirations are spontaneous, patient has a normal effort and rate, no accessory muscle use noted, bilateral breath sounds even and clear.  CARDIAC: Patient has a tachycardiac rate and regular rhythm, no periphreal edema noted, capillary refill < 3 seconds.  ABDOMEN: Soft and tender to palpation to LLQ, no distention noted, normoactive bowel sounds present in all four quadrants.  NEUROLOGIC: Pupils equal bilaterally, eyes open spontaneously, behavior appropriate to situation, follows commands, facial expression symmetrical, bilateral hand grasp equal and even, purposeful motor response noted, normal sensation in all extremities when touched with a finger.

## 2019-04-21 NOTE — HPI
59 yro F with PMH of HTN, IDDM2 (AIC 10.8 2/2019), CAD s/p stenting 2014, MURTAZA, and chronic lower back pain s/p left L5-S1 anterior to psoa interbody fusion who presents to the ED 4/20/19 with the complaint of confusion and fevers. Patient underwent L5-S1 fusion 4/12/19 with Dr George in [facility] c/b L ureter transection, repaired with end to end ureteroureterostomy stent placement by Dr Boyd in urology. Tentative plan was for stent removal in June. She also had a MADHURI drain placed during the procedure, which was removed during this prior admission.  Patient's  states her confusion started this AM and has been worsening throughout the day. He states she has pain in her R thigh and calf, present prior to her surgery. He did not measure her temperature at home, but she was subjectively febrile. Denies SOB, CP, abdominal pain, decreased UO, suprapubic pain, flank pain.     On presentation to the ED, patient was hypotensive with BP's 100s/50s and febrile with T max of 103. She received 30cc/kg of NS and was started on vanc and zosyn. CT a/p and MRI L spine were completed. Imaging showed air in the L5-S1 post-operative bed and air in the L ureter, possibly communication between post-op bed and ureter. UA showed >100 WBCs and 3+ leuks.

## 2019-04-21 NOTE — ASSESSMENT & PLAN NOTE
-likely in setting of infection, not due to bicarb retention- ABG shows metabolic acidosis with respiratory compensation

## 2019-04-21 NOTE — ASSESSMENT & PLAN NOTE
59 yro F with PMH of HTN, IDDM2 (AIC 10.8 2/2019) and chronic lower back pain s/p left L5-S1 anterior to psoa interbody fusion who presents to the ED 4/20/19 with the complaint of confusion and fevers. Patient underwent L5-S1 fusion 4/12/19 with Dr George in [facility] c/b L ureter transection, repaired with end to end ureteroureterostomy stent placement by Dr Boyd in urology.    -In ED, BP's 100s/50s, T max of 103, LA 4.6, WBC WNL's  -received 30cc/kg of NS and was started on vanc and zosyn  -CT a/p and MRI L spine 4/20- air in the L5-S1 post-operative bed and air in the L ureter, possibly communication between post-op bed and ureter, concerning for abscess/ fluid collection  -UA showed >100 WBCs and 3+ leuks.   -continue broad spectrum abx coverage with vanc and zosyn  -will trend LA  -BCx and UCx pending  -[facility] and urology consulted- per ED physician discussion with [facility], fluid collection/abscess/air coarses near surgical bed, but more likely related to ureter  -Per urology and IR, air in ureter/surgical bed cannot be accessed by IR, so general surgery will assess patient  -patient originally admitted to MICU, planning to take patient to OR tonight and will be transferred to SICU post-operatively

## 2019-04-21 NOTE — CONSULTS
[facility]  Urology  Consult Note    Patient Name: Mariann Huff  MRN: [id number]  Admission Date: 4/20/2019  Hospital Length of Stay: 0   Code Status: Full Code   Attending Provider: Paul Molina MD   Consulting Provider: Hugh Higuera MD  Primary Care Physician: Jasbir Haney MD  Principal Problem:<principal problem not specified>    Inpatient consult to Urology  Consult performed by: Hugh Higuera MD  Consult ordered by: Nadir Jenkins MD          Subjective:     HPI:  Mariann Huff is a 59 y.o. female with history of HTN, type 2 diabetes mellitus, CAD, NSTEMI, and back pain who presents to [facility] with altered mental status and sepsis. She underwent L5-S1 OLIF with NSGY on 4/12 and had intraoperative left ureteral injury with ureteroureteral anastomosis and ureteral stent placement. She did well initially and had correa and MADHURI drain removed on 4/16. She began having chills and altered mental status 2 days ago and this has progressively worsened. No complaints of pain.     She is altered and HPI is limited. In the ED she is febrile to 103 and tachycardic and pressures are low normal. WBC is 5, creatinine is 3.6 baseline 1.0, lactate is 4.6. Cath UA concerning for infection, 3+ LE, >100 WBCs, and many bacteria on microscopy.    CT and MRI abdomen both show air with minimal fluid near the surgical site with left ureter coursing through. There is air throughout the proximal collecting system which is decompressed with JJ ureteral stent in good position.    Past Medical History:   Diagnosis Date    Anticoagulant long-term use     Asthma     Back pain     CAD (coronary artery disease)     s/p stentimg 2003 (2),2009 (1)    Carotid artery stenosis     Diabetes mellitus type 2 in obese     HTN (hypertension), benign     Hyperlipidemia     Keloid cicatrix     NPDR (nonproliferative diabetic retinopathy) 8/17/2015    NSTEMI (non-ST elevated myocardial infarction)     Nuclear  sclerosis - Right Eye 3/18/2014    Primary localized osteoarthrosis, lower leg 2014    Sleep apnea        Past Surgical History:   Procedure Laterality Date    CARDIAC CATHETERIZATION      cataract extraction left eye      cataracts      CATHETERIZATION, HEART, LEFT Left 2014    Performed by [facility]man Kim MD at [facility] CATH LAB     SECTION, LOW TRANSVERSE      CORONARY ANGIOPLASTY      ESOPHAGOGASTRODUODENOSCOPY (EGD) N/A 2016    Performed by Gardenia Adamson MD at [facility] ENDO (4TH FLR)    EXCISION TURBINATE, SUBMUCOUS      FUSION, SPINE, LUMBAR, ANTERIOR APPROACH Left L5-S1 Anterior to Psoa Interbody Fusion, L5-S1 Posterior Instrumentation Left 2019    Performed by Mk George MD at [facility] OR 2ND FLR    HAND SURGERY Left     HAND SURGERY Right     torn ligament repair/ Dr. Yeboah    HYSTERECTOMY      Injection,steroid,epidural,transforaminal approach - Bilateral - S1 Bilateral 2018    Performed by Tl [facility]breu MD at [facility] PAIN MGT    Injection-steroid-epidural-caudal N/A 2019    Performed by Dave Bentley Jr., MD at [facility] PAIN MGT    INSERTION-INTRAOCULAR LENS (IOL) Right 2015    Performed by Good Domingo MD at [facility] OR 1ST FLR    left foot surgery      left wrist surgery      NASAL SEPTUM SURGERY  5/7/15    PHACOEMULSIFICATION-ASPIRATION-CATARACT Right 2015    Performed by Good Domingo MD at [facility] OR 1ST FLR    REPAIR, URETER  2019    Performed by Mk George MD at [facility] OR 2ND FLR    RESECTION-TURBINATES (SMR) N/A 2015    Performed by Dileep Dubois III, MD at [facility] OR 2ND FLR    rt elbow surgery      S/P LAD COATED STENT  2010    6 total     S/P OM1 STENT  2003    SEPTOPLASTY N/A 2015    Performed by Dileep Dubois III, MD at [facility] OR 2ND FLR    SINUS SURGERY      F.E.S.S.    SINUS SURGERY FUNCTIONAL ENDOSCOPIC WITH NAVIGATION WITH MAXILLARIES, ETHMOIDS, LEFT SPHENOID, LEFT LOLY  N/A 5/7/2015    Performed by Dileep Dubois III, MD at [facility] OR 2ND FLR    STENT, URETERAL placement  4/12/2019    Performed by Robin Boyd MD at [facility] OR 2ND FLR    TUBAL LIGATION         Review of patient's allergies indicates:  No Known Allergies    Family History     Problem Relation (Age of Onset)    Diabetes Mother, Father, Sister, Brother, Sister    Heart attack Father    Heart disease Mother    Leukemia Father    No Known Problems Sister, Brother, Brother, Maternal Grandmother, Maternal Grandfather, Paternal Grandmother, Paternal Grandfather, Son, Son, Maternal Aunt, Maternal Uncle, Paternal Aunt, Paternal Uncle          Tobacco Use    Smoking status: Never Smoker    Smokeless tobacco: Never Used   Substance and Sexual Activity    Alcohol use: No     Alcohol/week: 0.0 oz    Drug use: No    Sexual activity: Yes     Partners: Male     Birth control/protection: Post-menopausal     Comment:        Review of Systems   Unable to perform ROS: Mental status change       Objective:     Temp:  [100.6 °F (38.1 °C)-103 °F (39.4 °C)] 102.8 °F (39.3 °C)  Pulse:  [] 98  Resp:  [16-20] 18  SpO2:  [94 %-97 %] 94 %  BP: (100-125)/(51-61) 100/53     Body mass index is 27.46 kg/m².    Date 04/20/19 0700 - 04/21/19 0659   Shift 8014-5540 2841-8588 5452-2855 24 Hour Total   INTAKE   Shift Total(mL/kg)       OUTPUT   Urine  1650  1650   Shift Total(mL/kg)  1650(22.7)  1650(22.7)   Weight (kg)  72.6 72.6 72.6          Drains     Drain                 Closed/Suction Drain 04/12/19 2115 Left Other (Comment) Bulb 10 Fr. 8 days         Urethral Catheter 04/12/19 1650 Non-latex;Straight-tip 8 days         Urethral Catheter 04/20/19 1711 Latex 16 Fr. less than 1 day              Physical Exam   Nursing note and vitals reviewed.  Constitutional: She appears well-developed and well-nourished. She appears ill. No distress.   HENT:   Head: Normocephalic and atraumatic.   Neck: Trachea normal. Neck supple. No tracheal  deviation present.   Cardiovascular: Normal rate and intact distal pulses.    Pulmonary/Chest: Effort normal. No accessory muscle usage. No respiratory distress.   Abdominal: Soft. She exhibits no distension and no mass. There is no splenomegaly or hepatomegaly. There is no tenderness. There is no rebound, no guarding and no CVA tenderness. No hernia.   Benign exam without peritonitic findings  LLQ incision with steri strips  Mild left CVAT   Musculoskeletal: She exhibits no edema or deformity.   Neurological: She is disoriented.   Skin: Skin is warm and dry. No lesion and no rash noted. No cyanosis.     Psychiatric: She has a normal mood and affect. Judgment normal.     Significant Labs:    BMP:  Recent Labs   Lab 04/20/19  1710   *   K 4.3   CL 96   CO2 23   BUN 66*   CREATININE 3.6*   CALCIUM 10.0     CBC:  Recent Labs   Lab 04/20/19  1710   WBC 5.01   HGB 9.3*   HCT 29.9*        All pertinent labs results from the past 24 hours have been reviewed.    Significant Imaging:  All pertinent imaging results/findings from the past 24 hours have been reviewed.      Assessment and Plan:     Sepsis  - ICU admitting - broad spectrum abx, on vanc, zosyn  - discussed drain placement with IR and they do not feel that the collection is accessible anatomically  - general surgery consult, assistance with psoas abscess, rule out bowel injury   - f/u cultures, tailor abx  - Maintain correa catheter and ureteral stent        VTE Risk Mitigation (From admission, onward)        Ordered     Place sequential compression device  Until discontinued      04/20/19 2108     IP VTE HIGH RISK PATIENT  Once      04/20/19 2108          Thank you for your consult. I will follow-up with patient. Please contact us if you have any additional questions.    Hugh Higuera MD  Urology  [facility]

## 2019-04-21 NOTE — NURSING TRANSFER
Nursing Transfer Note      4/21/2019     Transfer To: OR    Transfer via bed    Transfer with  to O2, cardiac monitoring    Transported by Anesthesia    Medicines sent: Zosyn    Chart send with patient: Yes    Notified: spouse    Patient reassessed at: 04/21/2019, 0007 (date, time)    Upon arrival to floor: cardiac monitor applied

## 2019-04-21 NOTE — SUBJECTIVE & OBJECTIVE
Follow-up For: Procedure(s) (LRB):  LAPAROTOMY, EXPLORATORY; LIGATION OF LEFT URETER; DOUBLE J STENT REMOVAL LEFT URETER (Left)    Post-Operative Day: 1 Day Post-Op     Past Medical History:   Diagnosis Date    Anticoagulant long-term use     Asthma     Back pain     CAD (coronary artery disease)     s/p stentimg  (2), (1)    Carotid artery stenosis     Diabetes mellitus type 2 in obese     HTN (hypertension), benign     Hyperlipidemia     Keloid cicatrix     NPDR (nonproliferative diabetic retinopathy) 2015    NSTEMI (non-ST elevated myocardial infarction)     Nuclear sclerosis - Right Eye 3/18/2014    Primary localized osteoarthrosis, lower leg 2014    Sleep apnea        Past Surgical History:   Procedure Laterality Date    CARDIAC CATHETERIZATION      cataract extraction left eye      cataracts      CATHETERIZATION, HEART, LEFT Left 2014    Performed by [facility]man [facility] at [facility] CATH LAB     SECTION, LOW TRANSVERSE      CORONARY ANGIOPLASTY      ESOPHAGOGASTRODUODENOSCOPY (EGD) N/A 2016    Performed by Gardenia Adamson MD at [facility] ENDO (4TH FLR)    EXCISION TURBINATE, SUBMUCOUS      FUSION, SPINE, LUMBAR, ANTERIOR APPROACH Left L5-S1 Anterior to Psoa Interbody Fusion, L5-S1 Posterior Instrumentation Left 2019    Performed by Mk George MD at [facility] OR 2ND FLR    HAND SURGERY Left     HAND SURGERY Right     torn ligament repair/ Dr. Yeboah    HYSTERECTOMY      Injection,steroid,epidural,transforaminal approach - Bilateral - S1 Bilateral 2018    Performed by Tl Abreu MD at [facility] PAIN MGT    Injection-steroid-epidural-caudal N/A 2019    Performed by Dave Bentley Jr., MD at [facility] PAIN MGT    INSERTION-INTRAOCULAR LENS (IOL) Right 2015    Performed by Good Domingo MD at [facility] OR 1ST FLR    left foot surgery      left wrist surgery      NASAL SEPTUM SURGERY  5/7/15     PHACOEMULSIFICATION-ASPIRATION-CATARACT Right 9/1/2015    Performed by [facility]ingo MD at [facility] OR 1ST FLR    REPAIR, URETER  4/12/2019    Performed by Mk George MD at [facility] OR 2ND FLR    RESECTION-TURBINATES (SMR) N/A 5/7/2015    Performed by Dileep Dubois III, MD at [facility] OR 2ND FLR    rt elbow surgery      S/P LAD COATED STENT  05/14/2010    6 total     S/P OM1 STENT  08/2003    SEPTOPLASTY N/A 5/7/2015    Performed by Dileep Dubois III, MD at [facility] OR 2ND FLR    SINUS SURGERY      F.E.S.S.    SINUS SURGERY FUNCTIONAL ENDOSCOPIC WITH NAVIGATION WITH MAXILLARIES, ETHMOIDS, LEFT SPHENOID, LEFT LOLY N/A 5/7/2015    Performed by Dileep Dubois III, MD at [facility] OR 2ND FLR    STENT, URETERAL placement  4/12/2019    Performed by Robin Boyd MD at [facility] OR 2ND FLR    TUBAL LIGATION         Review of patient's allergies indicates:  No Known Allergies    Family History     Problem Relation (Age of Onset)    Diabetes Mother, Father, Sister, Brother, Sister    Heart attack Father    Heart disease Mother    Leukemia Father    No Known Problems Sister, Brother, Brother, Maternal Grandmother, Maternal Grandfather, Paternal Grandmother, Paternal Grandfather, Son, Son, Maternal Aunt, Maternal Uncle, Paternal Aunt, Paternal Uncle        Tobacco Use    Smoking status: Never Smoker    Smokeless tobacco: Never Used   Substance and Sexual Activity    Alcohol use: No     Alcohol/week: 0.0 oz    Drug use: No    Sexual activity: Yes     Partners: Male     Birth control/protection: Post-menopausal     Comment:       Review of Systems  Objective:     Vital Signs (Most Recent):  Temp: 98.4 °F (36.9 °C) (04/21/19 0250)  Pulse: 68 (04/21/19 0250)  Resp: 17 (04/21/19 0250)  BP: (!) 91/51 (04/20/19 2300)  SpO2: 98 % (04/21/19 0250) Vital Signs (24h Range):  Temp:  [98.4 °F (36.9 °C)-103.1 °F (39.5 °C)] 98.4 °F (36.9 °C)  Pulse:  [] 68  Resp:  [16-38] 17  SpO2:  [94 %-100 %] 98 %  BP:  ()/(51-61) 91/51  Arterial Line BP: (113)/(37) 113/37     Weight: 72.6 kg (160 lb)  Body mass index is 27.46 kg/m².      Intake/Output Summary (Last 24 hours) at 4/21/2019 0328  Last data filed at 4/21/2019 0236  Gross per 24 hour   Intake 6000 ml   Output 1975 ml   Net 4025 ml       Physical Exam   Constitutional: She is oriented to person, place, and time. She appears well-developed and well-nourished.   HENT:   Head: Normocephalic and atraumatic.   Intubated   Eyes: Right eye exhibits no discharge. Left eye exhibits no discharge.   Neck: Normal range of motion. Neck supple.   Cardiovascular: Normal rate and regular rhythm.   Pulmonary/Chest: Effort normal. No respiratory distress.   Abdominal:   Midline incision c/d/i.  MADHURI drainage SS.  Abdomen soft, non-distended.   Musculoskeletal: Normal range of motion.   Neurological: She is alert and oriented to person, place, and time.   Skin: Skin is warm and dry.   Psychiatric: She has a normal mood and affect. Her behavior is normal.   Vitals reviewed.      Vents:  Vent Mode: SIMV (04/21/19 0257)  Set Rate: 16 bmp (04/21/19 0257)  Vt Set: 550 mL (04/21/19 0257)  Pressure Support: 10 cmH20 (04/21/19 0257)  PEEP/CPAP: 5 cmH20 (04/21/19 0257)  Oxygen Concentration (%): 60 (04/21/19 0257)  Peak Airway Pressure: 41 cmH2O (04/21/19 0257)  Plateau Pressure: 0 cmH20 (04/21/19 0257)  Total Ve: 8.78 mL (04/21/19 0257)    Lines/Drains/Airways     Central Venous Catheter Line                 Percutaneous Central Line Insertion/Assessment - triple lumen  04/21/19 0100 less than 1 day          Drain                 Closed/Suction Drain 04/12/19 2115 Left Other (Comment) Bulb 10 Fr. 8 days         Open Drain 04/21/19 0218 Left LUQ less than 1 day         Urethral Catheter 04/20/19 1711 Latex 16 Fr. less than 1 day          Airway                 Airway - Non-Surgical 04/21/19 0029 Endotracheal Tube less than 1 day          Arterial Line                 Arterial Line 04/21/19  0031 Left Radial less than 1 day          Peripheral Intravenous Line                 Peripheral IV - Single Lumen 04/12/19 1135 20 G Left Forearm 8 days         Peripheral IV - Single Lumen 04/12/19 1627 16 G Right Wrist 8 days         Peripheral IV - Single Lumen 04/20/19 1650 18 G Left Antecubital less than 1 day         Peripheral IV - Single Lumen 04/20/19 1710 18 G Right Antecubital less than 1 day                Significant Labs:    CBC/Anemia Profile:  Recent Labs   Lab 04/20/19  1710 04/21/19  0301   WBC 5.01 8.31   HGB 9.3* 6.3*   HCT 29.9* 20.7*    146*   MCV 85 86   RDW 12.8 13.0        Chemistries:  Recent Labs   Lab 04/20/19  1710   *   K 4.3   CL 96   CO2 23   BUN 66*   CREATININE 3.6*   CALCIUM 10.0   ALBUMIN 2.8*   PROT 7.8   BILITOT 1.5*   ALKPHOS 171*   ALT 35   AST 49*   MG 2.3     Significant Imaging: I have reviewed all pertinent imaging results/findings within the past 24 hours.

## 2019-04-21 NOTE — CONSULTS
Radiology Consult    Mariann Huff is a 59 y.o. female with a history of left anterior L5-S1 interbody fusion who sustained a left ureteral transection subsequently repaired with left ureteroureterostomy and stent placement. Patient now presents today with new onset AMS and fevers concerning for sepsis. Lumbar MRI and abd/pelvis CT obtained 19 revealed a left psoas/prevertebral collection closely applied to the left ureter. Air in the ipsilateral collecting system suggests communication of the collection and adjacent ureter. IR consulted for consideration of percutaneous catheter drainage.      Past Medical History:   Diagnosis Date    Anticoagulant long-term use     Asthma     Back pain     CAD (coronary artery disease)     s/p stentimg  (2), (1)    Carotid artery stenosis     Diabetes mellitus type 2 in obese     HTN (hypertension), benign     Hyperlipidemia     Keloid cicatrix     NPDR (nonproliferative diabetic retinopathy) 2015    NSTEMI (non-ST elevated myocardial infarction)     Nuclear sclerosis - Right Eye 3/18/2014    Primary localized osteoarthrosis, lower leg 2014    Sleep apnea      Past Surgical History:   Procedure Laterality Date    CARDIAC CATHETERIZATION      cataract extraction left eye      cataracts      CATHETERIZATION, HEART, LEFT Left 2014    Performed by Wilman Kim MD at [facility] CATH LAB     SECTION, LOW TRANSVERSE      CORONARY ANGIOPLASTY      ESOPHAGOGASTRODUODENOSCOPY (EGD) N/A 2016    Performed by Gardenia Adamson MD at [facility] ENDO (4TH FLR)    EXCISION TURBINATE, SUBMUCOUS      FUSION, SPINE, LUMBAR, ANTERIOR APPROACH Left L5-S1 Anterior to Psoa Interbody Fusion, L5-S1 Posterior Instrumentation Left 2019    Performed by Mk George MD at [facility] OR 2ND FLR    HAND SURGERY Left     HAND SURGERY Right     torn ligament repair/ Dr. Yeboah    HYSTERECTOMY      Injection,steroid,epidural,transforaminal  approach - Bilateral - S1 Bilateral 9/25/2018    Performed by Tl [facility] at [facility] MGT    Injection-steroid-epidural-caudal N/A 2/7/2019    Performed by Dave Bentley Jr., MD at [facility] PAIN MGT    INSERTION-INTRAOCULAR LENS (IOL) Right 9/1/2015    Performed by Good Domingo MD at [facility] OR 1ST FLR    left foot surgery      left wrist surgery      NASAL SEPTUM SURGERY  5/7/15    PHACOEMULSIFICATION-ASPIRATION-CATARACT Right 9/1/2015    Performed by Good Domingo MD at [facility] OR 1ST FLR    REPAIR, URETER  4/12/2019    Performed by Mk George MD at [facility] OR 2ND FLR    RESECTION-TURBINATES (SMR) N/A 5/7/2015    Performed by Dileep Dubois III, MD at [facility] OR 2ND FLR    rt elbow surgery      S/P LAD COATED STENT  05/14/2010    6 total     S/P OM1 STENT  08/2003    SEPTOPLASTY N/A 5/7/2015    Performed by Dileep Dubois III, MD at [facility] OR 2ND FLR    SINUS SURGERY      F.E.S.S.    SINUS SURGERY FUNCTIONAL ENDOSCOPIC WITH NAVIGATION WITH MAXILLARIES, ETHMOIDS, LEFT SPHENOID, LEFT LOLY N/A 5/7/2015    Performed by Dileep Dubois III, MD at [facility] OR 2ND FLR    STENT, URETERAL placement  4/12/2019    [facility]    TUBAL LIGATION         Discussed with primary team, Dr. Salas.    Imaging reviewed with Radiology staff, Dr. Jiménez.     Procedure: Abscess drainage    Scheduled Meds:    [START ON 4/21/2019] piperacillin-tazobactam (ZOSYN) IVPB  4.5 g Intravenous Q12H     Continuous Infusions:   PRN Meds:albuterol-ipratropium, dextrose 50%, glucagon (human recombinant), insulin aspart U-100, nitroGLYCERIN, sodium chloride 0.9%    Allergies: Review of patient's allergies indicates:  No Known Allergies    Labs:  No results for input(s): INR in the last 168 hours.    Invalid input(s):  PT,  PTT    Recent Labs   Lab 04/20/19  1710   WBC 5.01   HGB 9.3*   HCT 29.9*   MCV 85         Recent Labs   Lab 04/20/19  1710   *   *    K 4.3   CL 96   CO2 23   BUN 66*   CREATININE 3.6*   CALCIUM 10.0   MG 2.3   ALT 35   AST 49*   ALBUMIN 2.8*   BILITOT 1.5*         Vitals (Most Recent):  Temp: (!) 102.8 °F (39.3 °C)(Critical care team at bedside and aware) (04/20/19 2057)  Pulse: 98 (04/20/19 2057)  Resp: 18 (04/20/19 2057)  BP: (!) 100/53 (04/20/19 2057)  SpO2: (!) 94 % (04/20/19 2057)    Plan:   58yo F w/prevertebral collection s/p repair of ureteral transection.     Imaging reviewed with IR staff. No safe window present for safe percutaneous catheter drainage of prevertebral fluid collection. Recommend consultation with general surgery for consideration of surgical drainage/debridement.     Bishnu Bolton MD  PGY-IV  Dept of Radiology   Pager: [phone]

## 2019-04-21 NOTE — ASSESSMENT & PLAN NOTE
-holding home meds in setting of sepsis: irbesartan 300 mg, toprol , amlodipine 10, chlorthalidone 50 mg

## 2019-04-21 NOTE — PROGRESS NOTES
[facility]  Urology  Progress Note    Patient Name: Mariann Huff  MRN: [id number]  Admission Date: 4/20/2019  Hospital Length of Stay: 1 days  Code Status: Full Code   Attending Provider: Paul Carlson MD  Primary Care Physician: Jasbir Haney MD    Subjective:     HPI:  Mariann Huff is a 59 y.o. female with history of HTN, type 2 diabetes mellitus, CAD, NSTEMI, and back pain who presents to [facility] with altered mental status and sepsis. She underwent L5-S1 OLIF with NSGY on 4/12 and had intraoperative left ureteral injury with ureteroureteral anastomosis and ureteral stent placement. She did well initially and had correa and MADHURI drain removed on 4/16. She began having chills and altered mental status 2 days ago and this has progressively worsened. No complaints of pain.     She is altered and HPI is limited. In the ED she is febrile to 103 and tachycardic and pressures are low normal. WBC is 5, creatinine is 3.6 baseline 1.0, lactate is 4.6. Cath UA concerning for infection, 3+ LE, >100 WBCs, and many bacteria on microscopy.    CT and MRI abdomen both show air with minimal fluid near the surgical site with left ureter coursing through. There is air throughout the proximal collecting system which is decompressed with JJ ureteral stent in good position.    Taken for ex lap, ligation of left ureter and left neph tube placement on 4/21/19.    Interval History:   Continues to require pressor support however weaning with administration of blood and fluids  Uncomplicated left neph tube placement with clear output  Afebrile following surgery    Review of Systems  Objective:     Temp:  [97.7 °F (36.5 °C)-103.1 °F (39.5 °C)] 97.7 °F (36.5 °C)  Pulse:  [] 87  Resp:  [16-38] 18  SpO2:  [94 %-100 %] 99 %  BP: ()/(44-61) 101/44  Arterial Line BP: (107-141)/(37-53) 141/53     Body mass index is 27.46 kg/m².    Date 04/21/19 0700 - 04/22/19 0659   Shift 3703-8132 6395-8407 9419-8256 24 Hour Total    INTAKE   Shift Total(mL/kg)       OUTPUT   Urine(mL/kg/hr) 120   120   Shift Total(mL/kg) 120(1.7)   120(1.7)   Weight (kg) 72.6 72.6 72.6 72.6          Drains     Drain                 NG/OG Tube 04/21/19 0728 Center mouth less than 1 day         Nephrostomy 04/21/19 0629 Left 8 Fr. less than 1 day         Open Drain 04/21/19 0218 Left LUQ less than 1 day         Urethral Catheter 04/20/19 1711 Latex 16 Fr. less than 1 day                Physical Exam   Constitutional: She appears well-developed and well-nourished.   HENT:   Head: Normocephalic and atraumatic.   Neck: No JVD present.   Cardiovascular: Normal rate and regular rhythm.    Pulmonary/Chest:   Intubated   Abdominal: Soft. She exhibits distension. There is no rebound and no guarding.   Dressing c/d/i  MADHURI drain with minimal SS output   Genitourinary:   Genitourinary Comments: Fontenot draining clear yellow  Left neph tube with clear yellow urine   Neurological:   Sedated   Skin: She is not diaphoretic. No pallor.         Significant Labs:    BMP:  Recent Labs   Lab 04/20/19  1710 04/21/19  0301   * 142   K 4.3 4.1   CL 96 103   CO2 23 17*   BUN 66* 60*   CREATININE 3.6* 3.4*   CALCIUM 10.0 8.6*       CBC:   Recent Labs   Lab 04/20/19  1710 04/21/19  0140 04/21/19  0219 04/21/19  0301   WBC 5.01  --   --  8.31   HGB 9.3*  --   --  6.3*   HCT 29.9* 20* 16* 20.7*     --   --  146*       All pertinent labs results from the past 24 hours have been reviewed.    Significant Imaging:  All pertinent imaging results/findings from the past 24 hours have been reviewed.      Assessment/Plan:     * Ureteral transection of left native kidney  Orantoinette Huff is a 59 y.o. female s/p left ureteral injury on 4/12, presented with fever, AMS, and intraabdominal abscess, taken for washout and ligation of left ureter with neph tube placement on 4/21    - Management per SICU, appreciate assistance   - Continue broad spectrum abx, follow up intraop cultures   -  Transfusion PRBC this am for low H/H, suspect dilutional since arrival  - Wean pressors as tolerated  - Ok with fluids as necessary  - Maintain correa and neph tube at this time  - Vent per SICU          VTE Risk Mitigation (From admission, onward)        Ordered     Place sequential compression device  Until discontinued      04/20/19 2108     IP VTE HIGH RISK PATIENT  Once      04/20/19 2108          Yulissa Salas MD  Urology  [facility]

## 2019-04-21 NOTE — SIGNIFICANT EVENT
Discussed case with general surgery. Agree that patient undergo ex-lap for source control. Procedure discussed with the patient's . Risks and benefits explained. He agrees to proceed with surgery.    Hugh Higuera MD  Urology, PGY-2  [facility]

## 2019-04-21 NOTE — ASSESSMENT & PLAN NOTE
-patient with BG >370 on admission  -home regimen: aspart 35, 45, 35 with meals and long acting 50 BID  -A1c 10's in Feb  -will give aspart 10 units and recheck BG, considering insulin ggt if remains hyperglycemic  -patient NPO for possible surgical procedure

## 2019-04-21 NOTE — PROGRESS NOTES
NEUROSURGICAL POST-OPERATIVE PROGRESS NOTE    DATE OF SERVICE:  04/21/2019      ATTENDING PHYSICIAN:  Mk George MD    SUBJECTIVE:    INTERIM HISTORY:    This is a very pleasant 59 y.o. y.o. female, who is status post-op day 9 left L5-S1 OLIF, intraoperative ureter injury repaired by ureteroureterostomy and stent by urology. Was discharged and follow-up with urology and neurosurgery on post-op day 4. Fontenot was removed by urology and patient was ambulating normally in clinic. Was taking Bactrim DS. Started to have chills on Friday. Readmitted for urosepsis yesterday. Underwent emergent exploratory laparotomy by urology for concerns of retroperitoneal abscess, air in the retroperitoneum and in left renal collecting system. Urology op note describes that ureter anastomosis was found to be broken and decision was made to clip the ureter. Percutaneous nephrostomy tube placement was then carried out.    Sedated under mechanical ventilation.     at bedside.                OBJECTIVE:    PHYSICAL EXAMINATION:   Vitals:    04/21/19 0915   BP:    Pulse: 84   Resp: 20   Temp:        Neurosurgery Physical Exam    Ortho Exam    Neurologic Exam    Dressing CDI, OLIF left abdominal wound clean    LABS    Urine cultures, blood cultures, intra-op culture all showing gram negative rods    RADIOLOGY:    CT abdomen yesterday: small retroperitoneal collection with air in the retroperitoneum, left renal collecting system and para-axial lumbar muscles. Correct positioning of interbody spacer and pedicle screws at L5 and S1.     ASSESMENT:    This is a 59 y.o. female who is s/p day 9 left L5-S1 OLIF, complicated by ureter injury, failure of ureter anastomosis, urosepsis to gram negative rods. Post-op day one exploratory laparotomy, left ureter clipping and left percutaneous nephrostomy. Now hemodynamically stable.       Problem List Items Addressed This Visit        Neuro    Encephalopathy    Current Assessment & Plan      -likely in setting of infection, not due to bicarb retention- ABG shows metabolic acidosis with respiratory compensation            Pulmonary    Encounter for weaning from ventilator       Renal/    Metabolic acidosis       ID    Sepsis    Current Assessment & Plan     - ICU admitting - broad spectrum abx, on vanc, zosyn  - discussed drain placement with IR and they do not feel that the collection is accessible anatomically  - general surgery consult, assistance with psoas abscess, rule out bowel injury   - f/u cultures, tailor abx  - Maintain correa catheter and ureteral stent            Endocrine    Type 2 diabetes mellitus with diabetic peripheral angiopathy without gangrene (Chronic)    Relevant Medications    insulin aspart U-100 pen 10 Units (Completed)    insulin regular (Humulin R) 100 Units in sodium chloride 0.9% 100 mL infusion       Orthopedic    * (Principal) Ureteral transection of left native kidney - Primary    Current Assessment & Plan     Mariann Huff is a 59 y.o. female s/p left ureteral injury on 4/12, presented with fever, AMS, and intraabdominal abscess, taken for washout and ligation of left ureter with neph tube placement on 4/21    - Management per SICU, appreciate assistance   - Continue broad spectrum abx, follow up intraop cultures   - Transfusion PRBC this am for low H/H, suspect dilutional since arrival  - Wean pressors as tolerated  - Ok with fluids as necessary  - Maintain correa and neph tube at this time  - Vent per SICU         Relevant Orders    Case Request Operating Room: LAPAROTOMY, EXPLORATORY (Completed)    Case Request Operating Room: Creation, Nephrostomy, Percutaneous (Completed)          PLAN:    No indication for spinal hardware revision at this time. Recommending to treat infection with tailored IV antibiotics for at least 4-6 weeks.     Early mobilization once hemodynamically stable.     Will follow closely        Mk George MD  Pager: [phone]

## 2019-04-21 NOTE — SUBJECTIVE & OBJECTIVE
Past Medical History:   Diagnosis Date    Anticoagulant long-term use     Asthma     Back pain     CAD (coronary artery disease)     s/p stentimg  (2), (1)    Carotid artery stenosis     Diabetes mellitus type 2 in obese     HTN (hypertension), benign     Hyperlipidemia     Keloid cicatrix     NPDR (nonproliferative diabetic retinopathy) 2015    NSTEMI (non-ST elevated myocardial infarction)     Nuclear sclerosis - Right Eye 3/18/2014    Primary localized osteoarthrosis, lower leg 2014    Sleep apnea        Past Surgical History:   Procedure Laterality Date    CARDIAC CATHETERIZATION      cataract extraction left eye      cataracts      CATHETERIZATION, HEART, LEFT Left 2014    Performed by Wilman Kim MD at [facility] CATH LAB     SECTION, LOW TRANSVERSE      CORONARY ANGIOPLASTY      ESOPHAGOGASTRODUODENOSCOPY (EGD) N/A 2016    Performed by Gardenia Adamson MD at [facility] ENDO (4TH FLR)    EXCISION TURBINATE, SUBMUCOUS      FUSION, SPINE, LUMBAR, ANTERIOR APPROACH Left L5-S1 Anterior to Psoa Interbody Fusion, L5-S1 Posterior Instrumentation Left 2019    Performed by Mk George MD at [facility] OR 2ND FLR    HAND SURGERY Left     HAND SURGERY Right     torn ligament repair/ Dr. Yeboah    HYSTERECTOMY      Injection,steroid,epidural,transforaminal approach - Bilateral - S1 Bilateral 2018    Performed by Tl Abreu MD at [facility] PAIN MGT    Injection-steroid-epidural-caudal N/A 2019    Performed by Dave Bentley Jr., MD at [facility] PAIN MGT    INSERTION-INTRAOCULAR LENS (IOL) Right 2015    Performed by Good Domingo MD at [facility] OR 1ST FLR    left foot surgery      left wrist surgery      NASAL SEPTUM SURGERY  5/7/15    PHACOEMULSIFICATION-ASPIRATION-CATARACT Right 2015    Performed by Good Domingo MD at [facility] OR 1ST FLR    REPAIR, URETER  2019    Performed by Mk George MD at [facility] OR 2ND FLR     RESECTION-TURBINATES (SMR) N/A 5/7/2015    Performed by Dileep [facility], [facility] at [facility] OR 2ND FLR    rt elbow surgery      S/P LAD COATED STENT  05/14/2010    6 total     S/P OM1 STENT  08/2003    SEPTOPLASTY N/A 5/7/2015    Performed by Dileep [facility] III, [facility] at [facility] OR 2ND FLR    SINUS SURGERY      F.E.S.S.    SINUS SURGERY FUNCTIONAL ENDOSCOPIC WITH NAVIGATION WITH MAXILLARIES, ETHMOIDS, LEFT SPHENOID, LEFT LOLY N/A 5/7/2015    Performed by Dileep Dubois III, MD at [facility] OR [facility] FLR    STENT, URETERAL placement  4/12/2019    [facility]    TUBAL LIGATION         Review of patient's allergies indicates:  No Known Allergies    Family History     Problem Relation (Age of Onset)    Diabetes Mother, Father, Sister, Brother, Sister    Heart attack Father    Heart disease Mother    Leukemia Father    No Known Problems Sister, Brother, Brother, Maternal Grandmother, Maternal Grandfather, Paternal Grandmother, Paternal Grandfather, Son, Son, Maternal Aunt, Maternal Uncle, Paternal Aunt, Paternal Uncle          Tobacco Use    Smoking status: Never Smoker    Smokeless tobacco: Never Used   Substance and Sexual Activity    Alcohol use: No     Alcohol/week: 0.0 oz    Drug use: No    Sexual activity: Yes     Partners: Male     Birth control/protection: Post-menopausal     Comment:        Review of Systems   Unable to perform ROS: Mental status change       Objective:     Temp:  [100.6 °F (38.1 °C)-103 °F (39.4 °C)] 102.8 °F (39.3 °C)  Pulse:  [] 98  Resp:  [16-20] 18  SpO2:  [94 %-97 %] 94 %  BP: (100-125)/(51-61) 100/53     Body mass index is 27.46 kg/m².    Date 04/20/19 0700 - 04/21/19 0659   Shift 7140-6113 1226-1290 2470-2836 24 Hour Total   INTAKE   Shift Total(mL/kg)       OUTPUT   Urine  1650  1650   Shift Total(mL/kg)  1650(22.7)  1650(22.7)   Weight (kg)  72.6 72.6 72.6          Drains     Drain                 Closed/Suction Drain 04/12/19 2113  Left Other (Comment) Bulb 10 Fr. 8 days         Urethral Catheter 04/12/19 1650 Non-latex;Straight-tip 8 days         Urethral Catheter 04/20/19 1711 Latex 16 Fr. less than 1 day              Physical Exam   Nursing note and vitals reviewed.  Constitutional: She appears well-developed and well-nourished. She appears ill. No distress.   HENT:   Head: Normocephalic and atraumatic.   Neck: Trachea normal. Neck supple. No tracheal deviation present.   Cardiovascular: Normal rate and intact distal pulses.    Pulmonary/Chest: Effort normal. No accessory muscle usage. No respiratory distress.   Abdominal: Soft. She exhibits no distension and no mass. There is no splenomegaly or hepatomegaly. There is no tenderness. There is no rebound, no guarding and no CVA tenderness. No hernia.   Benign exam without peritonitic findings  LLQ incision with steri strips  Mild left CVAT   Musculoskeletal: She exhibits no edema or deformity.   Neurological: She is disoriented.   Skin: Skin is warm and dry. No lesion and no rash noted. No cyanosis.     Psychiatric: She has a normal mood and affect. Judgment normal.     Significant Labs:    BMP:  Recent Labs   Lab 04/20/19  1710   *   K 4.3   CL 96   CO2 23   BUN 66*   CREATININE 3.6*   CALCIUM 10.0     CBC:  Recent Labs   Lab 04/20/19  1710   WBC 5.01   HGB 9.3*   HCT 29.9*        All pertinent labs results from the past 24 hours have been reviewed.    Significant Imaging:  All pertinent imaging results/findings from the past 24 hours have been reviewed.

## 2019-04-21 NOTE — CONSULTS
[facility]  Neurosurgery  Consult Note    Inpatient consult to Neurosurgery  Consult performed by: Adrián Mosqueda MD  Consult ordered by: Nadir Jenkins MD  Reason for consult: concern for sepsis, recent post op        Subjective:     Chief Complaint/Reason for Admission: concern for sepsis, recent post op    History of Present Illness: Ms Huff is a 59F with history of HTN, DM, CAD, NSTEMI, and recent L5-S1 OLIF on  with intraoperative L ureteral injury repaired by urology with stent that presents to ED with AMS and fevers, concerning for sepsis.  states that he noticed this morning that she was beginning to show signs of altered mental status and developed fevers. She has been ambulating around the house with a walker, no recent changes in numbness/weakness. Back and lateral incisions are clean/dry/intact.            (Not in a hospital admission)    Review of patient's allergies indicates:  No Known Allergies    Past Medical History:   Diagnosis Date    Anticoagulant long-term use     Asthma     Back pain     CAD (coronary artery disease)     s/p stentimg  (2), (1)    Carotid artery stenosis     Diabetes mellitus type 2 in obese     HTN (hypertension), benign     Hyperlipidemia     Keloid cicatrix     NPDR (nonproliferative diabetic retinopathy) 2015    NSTEMI (non-ST elevated myocardial infarction)     Nuclear sclerosis - Right Eye 3/18/2014    Primary localized osteoarthrosis, lower leg 2014    Sleep apnea      Past Surgical History:   Procedure Laterality Date    CARDIAC CATHETERIZATION      cataract extraction left eye      cataracts      CATHETERIZATION, HEART, LEFT Left 2014    Performed by Wilman Kim [facility] at [facility] CATH LAB     SECTION, LOW TRANSVERSE      CORONARY ANGIOPLASTY      ESOPHAGOGASTRODUODENOSCOPY (EGD) N/A 2016    Performed by Gardenia Adamson MD at [facility] ENDO (4TH FLR)    EXCISION  TURBINATE, SUBMUCOUS      FUSION, SPINE, LUMBAR, ANTERIOR APPROACH Left L5-S1 Anterior to Psoa Interbody Fusion, L5-S1 Posterior Instrumentation Left 4/12/2019    Performed by Mk George MD at [facility] OR 2ND FLR    HAND SURGERY Left     HAND SURGERY Right     torn ligament repair/ Dr. Yeboah    HYSTERECTOMY      Injection,steroid,epidural,transforaminal approach - Bilateral - S1 Bilateral 9/25/2018    Performed by Tl Abreu MD at [facility] PAIN MGT    Injection-steroid-epidural-caudal N/A 2/7/2019    Performed by Dave Bentley Jr., MD at [facility] PAIN MGT    INSERTION-INTRAOCULAR LENS (IOL) Right 9/1/2015    [facility]    left foot surgery      left wrist surgery      NASAL SEPTUM SURGERY  5/7/15    PHACOEMULSIFICATION-ASPIRATION-CATARACT Right 9/1/2015    Performed by Good Domingo MD at [facility] OR [facility] FLR    REPAIR, URETER  4/12/2019    [facility]    RESECTION-TURBINATES (SMR) N/A 5/7/2015    Performed by Dileep Dubois III, MD at [facility] OR [address] [place] [address]    rt elbow surgery      S/P LAD COATED STENT  05/14/2010    6 total     S/P OM1 STENT  08/2003    SEPTOPLASTY N/A 5/7/2015    [facility]    SINUS SURGERY      F.E.S.S.    SINUS SURGERY FUNCTIONAL ENDOSCOPIC WITH NAVIGATION WITH MAXILLARIES, ETHMOIDS, LEFT SPHENOID, LEFT LOLY N/A 5/7/2015    Performed by Dileep Dubois III, MD at [facility] OR [address] [place] [address]    STENT, URETERAL placement  4/12/2019    Performed by Robin Boyd MD at [facility] OR [address] [place] [address]    TUBAL LIGATION       Family History     Problem Relation (Age of Onset)    Diabetes Mother, Father, Sister, Brother, Sister    Heart attack Father    Heart disease Mother    Leukemia Father    No Known Problems Sister, Brother, Brother, Maternal Grandmother, Maternal Grandfather, Paternal Grandmother, Paternal Grandfather, Son, Son, Maternal Aunt, Maternal Uncle,  Paternal Aunt, Paternal Uncle        Tobacco Use    Smoking status: Never Smoker    Smokeless tobacco: Never Used   Substance and Sexual Activity    Alcohol use: No     Alcohol/week: 0.0 oz    Drug use: No    Sexual activity: Yes     Partners: Male     Birth control/protection: Post-menopausal     Comment:      Review of Systems   Unable to perform ROS: Mental status change     Objective:     Weight: 72.6 kg (160 lb)  Body mass index is 27.46 kg/m².  Vital Signs (Most Recent):  Temp: (!) 103 °F (39.4 °C) (04/20/19 1843)  Pulse: 109 (04/20/19 1935)  Resp: 16 (04/20/19 1901)  BP: (!) 103/51 (04/20/19 1935)  SpO2: (!) 94 % (04/20/19 1935) Vital Signs (24h Range):  Temp:  [100.6 °F (38.1 °C)-103 °F (39.4 °C)] 103 °F (39.4 °C)  Pulse:  [104-123] 109  Resp:  [16-20] 16  SpO2:  [94 %-97 %] 94 %  BP: (103-125)/(51-61) 103/51     Date 04/20/19 0700 - 04/21/19 0659   Shift 3057-1375 4220-7174 7442-0047 24 Hour Total   INTAKE   Shift Total(mL/kg)       OUTPUT   Urine  1650  1650   Shift Total(mL/kg)  1650(22.7)  1650(22.7)   Weight (kg)  72.6 72.6 72.6                        Closed/Suction Drain 04/12/19 2115 Left Other (Comment) Bulb 10 Fr. (Active)            Urethral Catheter 04/12/19 1650 Non-latex;Straight-tip (Active)            Urethral Catheter 04/20/19 1711 Latex 16 Fr. (Active)   Output (mL) 900 mL 4/20/2019  5:00 PM       Neurosurgery Physical Exam  -drowsy, oriented x2  -PERRL, EOMI, face symmetrical, tongue midline, facial sensation symmetric, shoulder shrug full strength and symmetric  -Motor: 5/5 throughout the upper and lower extremites bilaterally  -sensation intact to light touch throughout  -back and lateral incisions are c/d/i  -no back pain to palpation      Significant Labs:  Recent Labs   Lab 04/20/19  1710   *   *   K 4.3   CL 96   CO2 23   BUN 66*   CREATININE 3.6*   CALCIUM 10.0   MG 2.3     Recent Labs   Lab 04/20/19  1710   WBC 5.01   HGB 9.3*   HCT 29.9*        No  results for input(s): LABPT, INR, APTT in the last 48 hours.  Microbiology Results (last 7 days)     Procedure Component Value Units Date/Time    Influenza A & B by Molecular [[id number]] Collected:  04/20/19 1713    Order Status:  Completed Specimen:  Nasopharyngeal Swab Updated:  04/20/19 1804     Influenza A, Molecular Negative     Influenza B, Molecular Negative     Flu A & B Source Nasal swab    Urine culture [[id number]] Collected:  04/20/19 1711    Order Status:  No result Specimen:  Urine Updated:  04/20/19 1745    Blood culture x two cultures. Draw prior to antibiotics. [[id number]] Collected:  04/20/19 1711    Order Status:  Sent Specimen:  Blood from Peripheral, Antecubital, Right Updated:  04/20/19 1723    Blood culture x two cultures. Draw prior to antibiotics. [[id number]] Collected:  04/20/19 1705    Order Status:  Sent Specimen:  Blood from Peripheral, Antecubital, Right Updated:  04/20/19 1719        CRP:   Recent Labs   Lab 04/20/19 1710   .8*     ESR: No results for input(s): POCESR, ERYTHROCYTES in the last 48 hours.  Recent Lab Results       04/20/19 1713 04/20/19 1711 04/20/19 1710        Influenza A, Molecular Negative         Influenza B, Molecular Negative         Albumin     2.8     Alkaline Phosphatase     171     ALT     35     Anion Gap     15     Aniso     Slight     Appearance, UA   Cloudy       AST     49     Bacteria, UA   Many       Baso #     0.01     Basophil%     0.2     Bilirubin (UA)   Negative       BILIRUBIN TOTAL     1.5  Comment:  For infants and newborns, interpretation of results should be based  on gestational age, weight and in agreement with clinical  observations.  Premature Infant recommended reference ranges:  Up to 24 hours.............<8.0 mg/dL  Up to 48 hours............<12.0 mg/dL  3-5 days..................<15.0 mg/dL  6-29 days.................<15.0 mg/dL       BUN, Bld     66     Calcium     10.0     Chloride     96     CO2     23     Color, UA    Yellow       Creatinine     3.6     CRP     336.8     Differential Method     Automated     Dohle Bodies     Present     eGFR if      15.3     eGFR if non      13.2  Comment:  Calculation used to obtain the estimated glomerular filtration  rate (eGFR) is the CKD-EPI equation.        Eos #     0.0     Eosinophil%     0.0     Flu A & B Source Nasal swab         Glucose     376     Glucose, UA   3+       Gran # (ANC)     4.7     Gran%     93.4     Hematocrit     29.9     Hemoglobin     9.3     Hyaline Casts, UA   0       Immature Grans (Abs)     0.02  Comment:  Mild elevation in immature granulocytes is non specific and   can be seen in a variety of conditions including stress response,   acute inflammation, trauma and pregnancy. Correlation with other   laboratory and clinical findings is essential.       Immature Granulocytes     0.4     Ketones, UA   Negative       Lactate, [place]ane     4.6  Comment:  Falsely low lactic acid results can be found in samples   containing >=13.0 mg/dL total bilirubin and/or >=3.5 mg/dL   direct bilirubin.  *Critical value -   Results called to and read back by:Nguyen Omalley RN.       Leukocytes, UA   3+       Lymph #     0.3     Lymph%     5.0     Magnesium     2.3     MCH     26.4     MCHC     31.1     MCV     85     Microscopic Comment   SEE COMMENT  Comment:  Other formed elements not mentioned in the report are not   present in the microscopic examination.          Mono #     0.1     Mono%     1.0     MPV     10.2     Nitrite, UA   Negative       nRBC     0     Occult Blood UA   3+       pH, UA   5.0       Platelet Estimate     Appears normal     Platelets     299     Poly     Occasional     Potassium     4.3     PROTEIN TOTAL     7.8     Protein, UA   1+  Comment:  Recommend a 24 hour urine protein or a urine   protein/creatinine ratio if globulin induced proteinuria is  clinically suspected.         RBC     3.52     RBC, UA   3       RDW      12.8     Sed Rate     99     Sodium     134     Specific [place], UA   1.015       Specimen UA   Urine, Catheterized       Squam Epithel, UA   1       Toxic Granulation     Present     WBC, UA   >100       WBC     5.01     Yeast, UA   None             Significant Diagnostics:  I have reviewed all pertinent imaging results/findings within the past 24 hours.    Assessment/Plan:     Sepsis  59F with recent L5-S1 OLIF with intraoperative ureteral injury, repaired by urology intraoperatively, with stent placement that presents with AMS, fevers, concern for sepsis. Urinalysis with innumerable WBCs and bacteria    -recommend admission to MICU/SICU or per urology recommendation for sepsis treatment  -fluid collection near L5-S1 interbody associated with left ureter and urinalysis showing UTI  -will f/u urology recommendations  -started on vanc/zosyn in ED  -fluid bolus given  -medical sepsis management per primary team  -will follow closely        Thank you for your consult.     Adrián Mosqueda MD  Neurosurgery  [facility]

## 2019-04-21 NOTE — TRANSFER OF CARE
"Anesthesia Transfer of Care Note    Patient: Mariann Huff    Procedure(s) Performed: Procedure(s) (LRB):  Creation, Nephrostomy, Percutaneous (Left)    Patient location: ICU (10 th floor)    Anesthesia Type: general    Transport from OR: Transported from OR on room air with adequate spontaneous ventilation. Transported from OR intubated on 100% O2 by AMBU with adequate controlled ventilation. Upon arrival to PACU/ICU, patient attached to ventilator and auscultated to confirm bilateral breath sounds and adequate TV. Continuous ECG monitoring in transport. Continuous SpO2 monitoring in transport. Continuos invasive BP monitoring in transport    Post pain: adequate analgesia    Post assessment: no apparent anesthetic complications and tolerated procedure well    Post vital signs: stable (on levophed infusion )    Level of consciousness: sedated    Nausea/Vomiting: no nausea/vomiting    Complications: none    Transfer of care protocol was followed      Last vitals:   Visit Vitals  BP (!) 101/44   Pulse 90   Temp 36.5 °C (97.7 °F) (Oral)   Resp 18   Ht 5' 4" (1.626 m)   Wt 72.6 kg (160 lb)   SpO2 97%   Breastfeeding? No   BMI 27.46 kg/m²     "

## 2019-04-21 NOTE — PROGRESS NOTES
Patient arrived from OR intubated. Upon arrival norepi gtt was at 0.24mcg/kg/hr. Orders per Dr. Crump to titrate pressor to maintain SBP>100 due to skewed MAP from low diastolic on arterial line. Labs with type and screen sent off; MD aware of lactic 10.6, a total of 2L NS bolus was given, 2u PRBCs ordered. Insulin and propofol gtts started. Notified MD of positive blood culture per Lab. Patient will go to IR for placement of nephrostomy tube.

## 2019-04-21 NOTE — ASSESSMENT & PLAN NOTE
- ICU admitting - broad spectrum abx, on vanc, zosyn  - discussed drain placement with IR and they do not feel that the collection is accessible anatomically  - general surgery consult, assistance with psoas abscess, rule out bowel injury   - f/u cultures, tailor abx  - Maintain correa catheter and ureteral stent

## 2019-04-21 NOTE — ASSESSMENT & PLAN NOTE
59F with recent L5-S1 OLIF with intraoperative ureteral injury, repaired by urology intraoperatively, with stent placement that presents with AMS, fevers, concern for sepsis. Urinalysis with innumerable WBCs and bacteria    -recommend admission to MICU/SICU or per urology recommendation for sepsis treatment  -fluid collection near L5-S1 interbody associated with left ureter and urinalysis showing UTI  -will f/u urology recommendations  -started on vanc/zosyn in ED  -fluid bolus given  -medical sepsis management per primary team  -will follow closely

## 2019-04-21 NOTE — H&P
[facility]  Critical Care - Surgery  History & Physical    Patient Name: Mariann Huff  MRN: [id number]  Admission Date: 4/20/2019  Code Status: Full Code  Attending Physician: Florin Puckett MD   Primary Care Provider: Jasbir Haney MD   Principal Problem: Ureteral transection of left native kidney    Subjective:     HPI:  PM of 4/20- Mariann Huff is a 59 y.o. female with history of HTN, type 2 diabetes mellitus, CAD, NSTEMI, and back pain who [place] [facility] [place] with altered mental status and sepsis. She underwent L5-S1 OLIF with NSGY on 4/12 and had intraoperative left ureteral injury with ureteroureteral anastomosis and ureteral stent placement. She did well initially and had correa and MADHURI drain removed on 4/16. She began having chills and altered mental status 2 days ago and this has progressively worsened. No complaints of pain.      She is altered and HPI is limited. In the ED she is febrile to 103 and tachycardic and pressures are low normal. WBC is 5, creatinine is 3.6 baseline 1.0, lactate is 4.6. Cath UA concerning for infection, 3+ LE, >100 WBCs, and many bacteria on microscopy.     CT and MRI abdomen both show air with minimal fluid near the surgical site with left ureter coursing through. There is air throughout the proximal collecting system which is decompressed with JJ ureteral stent in good position.    4/21- Decision was made to take pt to surgery, had an ex-lap with urology on 4/21 where they noted breakdown of ureter over the stent site.  L ureter was clipped and MADHURI drain left over the site.  Fascia and skin were closed.          Hospital/ICU Course:  No notes on file    Follow-up For: Procedure(s) (LRB):  LAPAROTOMY, EXPLORATORY; LIGATION OF LEFT URETER; DOUBLE J STENT REMOVAL LEFT URETER (Left)    Post-Operative Day: 1 Day Post-Op     Past Medical History:   Diagnosis Date    Anticoagulant long-term use     Asthma     Back pain     CAD (coronary artery disease)     s/p  stentimg  (2), (1)    Carotid artery stenosis     Diabetes mellitus type 2 in obese     HTN (hypertension), benign     Hyperlipidemia     Keloid cicatrix     NPDR (nonproliferative diabetic retinopathy) 2015    NSTEMI (non-ST elevated myocardial infarction)     Nuclear sclerosis - Right Eye 3/18/2014    Primary localized osteoarthrosis, lower leg 2014    Sleep apnea        Past Surgical History:   Procedure Laterality Date    CARDIAC CATHETERIZATION      cataract extraction left eye      cataracts      CATHETERIZATION, HEART, LEFT Left 2014    Performed by Wilman Kim MD at [facility] CATH LAB     SECTION, LOW TRANSVERSE      CORONARY ANGIOPLASTY      ESOPHAGOGASTRODUODENOSCOPY (EGD) N/A 2016    Performed by Gardenia Adamson MD at [facility] ENDO (4TH FLR)    EXCISION TURBINATE, SUBMUCOUS      FUSION, SPINE, LUMBAR, ANTERIOR APPROACH Left L5-S1 Anterior to Psoa Interbody Fusion, L5-S1 Posterior Instrumentation Left 2019    Performed by Mk George MD at [facility] OR 2ND FLR    HAND SURGERY Left     HAND SURGERY Right     torn ligament repair/ Dr. Yeboah    HYSTERECTOMY      Injection,steroid,epidural,transforaminal approach - Bilateral - S1 Bilateral 2018    Performed by Tl Abreu MD at [facility] PAIN MGT    Injection-steroid-epidural-caudal N/A 2019    Performed by Dave Bentley Jr., MD at [facility] PAIN MGT    INSERTION-INTRAOCULAR LENS (IOL) Right 2015    Performed by Good Domingo MD at [facility] OR 1ST FLR    left foot surgery      left wrist surgery      NASAL SEPTUM SURGERY  5/7/15    PHACOEMULSIFICATION-ASPIRATION-CATARACT Right 2015    Performed by Good Domingo MD at [facility] OR 1ST FLR    REPAIR, URETER  2019    Performed by Mk George MD at [facility] OR 2ND FLR    RESECTION-TURBINATES (SMR) N/A 2015    Performed by Dileep Dubois III, MD at [facility] OR 2ND FLR    rt elbow surgery      S/P LAD  COATED STENT  05/14/2010    6 total     S/P OM1 STENT  08/2003    SEPTOPLASTY N/A 5/7/2015    Performed by Dileep [facility]ois III, MD at [facility] OR 2ND FLR    SINUS SURGERY      F.E.S.S.    SINUS SURGERY FUNCTIONAL ENDOSCOPIC WITH NAVIGATION WITH MAXILLARIES, ETHMOIDS, LEFT SPHENOID, LEFT LOLY N/A 5/7/2015    Performed by Dileep Dubois III, MD at [facility] OR 2ND FLR    STENT, URETERAL placement  4/12/2019    Performed by Robin Boyd MD at [facility] OR 2ND FLR    TUBAL LIGATION         Review of patient's allergies indicates:  No Known Allergies    Family History     Problem Relation (Age of Onset)    Diabetes Mother, Father, Sister, Brother, Sister    Heart attack Father    Heart disease Mother    Leukemia Father    No Known Problems Sister, Brother, Brother, Maternal Grandmother, Maternal Grandfather, Paternal Grandmother, Paternal Grandfather, Son, Son, Maternal Aunt, Maternal Uncle, Paternal Aunt, Paternal Uncle        Tobacco Use    Smoking status: Never Smoker    Smokeless tobacco: Never Used   Substance and Sexual Activity    Alcohol use: No     Alcohol/week: 0.0 oz    Drug use: No    Sexual activity: Yes     Partners: Male     Birth control/protection: Post-menopausal     Comment:       Review of Systems  Objective:     Vital Signs (Most Recent):  Temp: 98.4 °F (36.9 °C) (04/21/19 0250)  Pulse: 68 (04/21/19 0250)  Resp: 17 (04/21/19 0250)  BP: (!) 91/51 (04/20/19 2300)  SpO2: 98 % (04/21/19 0250) Vital Signs (24h Range):  Temp:  [98.4 °F (36.9 °C)-103.1 °F (39.5 °C)] 98.4 °F (36.9 °C)  Pulse:  [] 68  Resp:  [16-38] 17  SpO2:  [94 %-100 %] 98 %  BP: ()/(51-61) 91/51  Arterial Line BP: (113)/(37) 113/37     Weight: 72.6 kg (160 lb)  Body mass index is 27.46 kg/m².      Intake/Output Summary (Last 24 hours) at 4/21/2019 0328  Last data filed at 4/21/2019 0236  Gross per 24 hour   Intake 6000 ml   Output 1975 ml   Net 4025 ml       Physical Exam   Constitutional: She is oriented to person,  place, and time. She appears well-developed and well-nourished.   HENT:   Head: Normocephalic and atraumatic.   Intubated   Eyes: Right eye exhibits no discharge. Left eye exhibits no discharge.   Neck: Normal range of motion. Neck supple.   Cardiovascular: Normal rate and regular rhythm.   Pulmonary/Chest: Effort normal. No respiratory distress.   Abdominal:   Midline incision c/d/i.  MADHURI drainage SS.  Abdomen soft, non-distended.   Musculoskeletal: Normal range of motion.   Neurological: She is alert and oriented to person, place, and time.   Skin: Skin is warm and dry.   Psychiatric: She has a normal mood and affect. Her behavior is normal.   Vitals reviewed.      Vents:  Vent Mode: SIMV (04/21/19 0257)  Set Rate: 16 bmp (04/21/19 0257)  Vt Set: 550 mL (04/21/19 0257)  Pressure Support: 10 cmH20 (04/21/19 0257)  PEEP/CPAP: 5 cmH20 (04/21/19 0257)  Oxygen Concentration (%): 60 (04/21/19 0257)  Peak Airway Pressure: 41 cmH2O (04/21/19 0257)  Plateau Pressure: 0 cmH20 (04/21/19 0257)  Total Ve: 8.78 mL (04/21/19 0257)    Lines/Drains/Airways     Central Venous Catheter Line                 Percutaneous Central Line Insertion/Assessment - triple lumen  04/21/19 0100 less than 1 day          Drain                 Closed/Suction Drain 04/12/19 2115 Left Other (Comment) Bulb 10 Fr. 8 days         Open Drain 04/21/19 0218 Left LUQ less than 1 day         Urethral Catheter 04/20/19 1711 Latex 16 Fr. less than 1 day          Airway                 Airway - Non-Surgical 04/21/19 0029 Endotracheal Tube less than 1 day          Arterial Line                 Arterial Line 04/21/19 0031 Left Radial less than 1 day          Peripheral Intravenous Line                 Peripheral IV - Single Lumen 04/12/19 1135 20 G Left Forearm 8 days         Peripheral IV - Single Lumen 04/12/19 1627 16 G Right Wrist 8 days         Peripheral IV - Single Lumen 04/20/19 1650 18 G Left Antecubital less than 1 day         Peripheral IV - Single  Lumen 04/20/19 1710 18 G Right Antecubital less than 1 day                Significant Labs:    CBC/Anemia Profile:  Recent Labs   Lab 04/20/19  1710 04/21/19  0301   WBC 5.01 8.31   HGB 9.3* 6.3*   HCT 29.9* 20.7*    146*   MCV 85 86   RDW 12.8 13.0        Chemistries:  Recent Labs   Lab 04/20/19  1710   *   K 4.3   CL 96   CO2 23   BUN 66*   CREATININE 3.6*   CALCIUM 10.0   ALBUMIN 2.8*   PROT 7.8   BILITOT 1.5*   ALKPHOS 171*   ALT 35   AST 49*   MG 2.3     Significant Imaging: I have reviewed all pertinent imaging results/findings within the past 24 hours.    Assessment/Plan:     * Ureteral transection of left native kidney    ASSESSMENT/PLAN:   Mariann Huff is a 59 y.o. female s/p ex-lap with transection of L ureter (4/21)    Neuro:   - Sedated with propofol  - Pain control with fentanyl    Pulmonary:   - Intubated    Vent Mode: SIMV  Oxygen Concentration (%):  [60] 60  Resp Rate Total:  [16 br/min] 16 br/min  Vt Set:  [550 mL] 550 mL  PEEP/CPAP:  [5 cmH20] 5 cmH20  Pressure Support:  [10 cmH20] 10 cmH20  Mean Airway Pressure:  [14 cmH20] 14 cmH20  -Regular ABGs while pt remains acidotic    Cardiac:   - On Levo (0.2) right now, wean as tolerated  - Also will be getting product and volume  - HR normal at this time    Renal:    - BUN/Cr elevated at 66/3.6 pre-operatively  - s/p transection of left ureter  - Plan for urgent IR nephrostomy tube placement    ID:   - On Zosyn  - Blood cultures showing gram neg rods at this time.    Hem/Onc:   - Hgb 6 post-op  -transfuse 2 units    Endocrine:    - Insulin gtt    Fluids/Electrolytes/Nutrition/GI:   - Replace electrolytes PRN  - NPO  - Will trend lactates and bolus accordingly    PPx:  - DVT: None until after IR treatment and can ensure pt not actively bleeding.      DISPO:  - Continue SICU care        Critical care was time spent personally by me on the following activities: development of treatment plan with patient or surrogate and bedside caregivers,  discussions with consultants, evaluation of patient's response to treatment, examination of patient, ordering and performing treatments and interventions, ordering and review of laboratory studies, ordering and review of radiographic studies, pulse oximetry, re-evaluation of patient's condition.  This critical care time did not overlap with that of any other provider or involve time for any procedures.     Henry Crump MD  Critical Care - Surgery  [facility]

## 2019-04-22 LAB
ALBUMIN SERPL BCP-MCNC: 1.9 G/DL (ref 3.5–5.2)
ALBUMIN SERPL BCP-MCNC: 2.1 G/DL (ref 3.5–5.2)
ALLENS TEST: ABNORMAL
ALLENS TEST: ABNORMAL
ALP SERPL-CCNC: 103 U/L (ref 55–135)
ALT SERPL W/O P-5'-P-CCNC: 572 U/L (ref 10–44)
ANION GAP SERPL CALC-SCNC: 11 MMOL/L (ref 8–16)
ANION GAP SERPL CALC-SCNC: 16 MMOL/L (ref 8–16)
ANISOCYTOSIS BLD QL SMEAR: SLIGHT
ANISOCYTOSIS BLD QL SMEAR: SLIGHT
AST SERPL-CCNC: 1553 U/L (ref 10–40)
AV INDEX (PROSTH): 0.71
AV MEAN GRADIENT: 2.91 MMHG
AV PEAK GRADIENT: 4.24 MMHG
AV VALVE AREA: 2.36 CM2
AV VELOCITY RATIO: 0.68
BACTERIA UR CULT: NO GROWTH
BACTERIA UR CULT: NORMAL
BASOPHILS # BLD AUTO: 0.08 K/UL (ref 0–0.2)
BASOPHILS NFR BLD: 0 % (ref 0–1.9)
BASOPHILS NFR BLD: 0.8 % (ref 0–1.9)
BASOPHILS NFR BLD: 3 % (ref 0–1.9)
BILIRUB SERPL-MCNC: 5.2 MG/DL (ref 0.1–1)
BSA FOR ECHO PROCEDURE: 1.81 M2
BUN SERPL-MCNC: 43 MG/DL (ref 6–20)
BUN SERPL-MCNC: 44 MG/DL (ref 6–20)
BURR CELLS BLD QL SMEAR: ABNORMAL
BURR CELLS BLD QL SMEAR: ABNORMAL
C DIFF GDH STL QL: NEGATIVE
C DIFF TOX A+B STL QL IA: NEGATIVE
CALCIUM SERPL-MCNC: 7.9 MG/DL (ref 8.7–10.5)
CALCIUM SERPL-MCNC: 8 MG/DL (ref 8.7–10.5)
CHLORIDE SERPL-SCNC: 109 MMOL/L (ref 95–110)
CHLORIDE SERPL-SCNC: 112 MMOL/L (ref 95–110)
CO2 SERPL-SCNC: 16 MMOL/L (ref 23–29)
CO2 SERPL-SCNC: 21 MMOL/L (ref 23–29)
CREAT SERPL-MCNC: 1.9 MG/DL (ref 0.5–1.4)
CREAT SERPL-MCNC: 1.9 MG/DL (ref 0.5–1.4)
CV ECHO LV RWT: 0.29 CM
DACRYOCYTES BLD QL SMEAR: ABNORMAL
DACRYOCYTES BLD QL SMEAR: ABNORMAL
DELSYS: ABNORMAL
DELSYS: ABNORMAL
DIFFERENTIAL METHOD: ABNORMAL
DOHLE BOD BLD QL SMEAR: PRESENT
DOHLE BOD BLD QL SMEAR: PRESENT
DOP CALC AO PEAK VEL: 1.03 M/S
DOP CALC AO VTI: 20.69 CM
DOP CALC LVOT AREA: 3.33 CM2
DOP CALC LVOT DIAMETER: 2.06 CM
DOP CALC LVOT PEAK VEL: 0.7 M/S
DOP CALC LVOT STROKE VOLUME: 48.77 CM3
DOP CALCLVOT PEAK VEL VTI: 14.64 CM
E WAVE DECELERATION TIME: 175.69 MSEC
E/A RATIO: 1.16
E/E' RATIO: 9.85
ECHO LV POSTERIOR WALL: 0.75 CM (ref 0.6–1.1)
EOSINOPHIL # BLD AUTO: 0.1 K/UL (ref 0–0.5)
EOSINOPHIL NFR BLD: 0 % (ref 0–8)
EOSINOPHIL NFR BLD: 0.8 % (ref 0–8)
EOSINOPHIL NFR BLD: 2 % (ref 0–8)
ERYTHROCYTE [DISTWIDTH] IN BLOOD BY AUTOMATED COUNT: 13.8 % (ref 11.5–14.5)
ERYTHROCYTE [DISTWIDTH] IN BLOOD BY AUTOMATED COUNT: 13.9 % (ref 11.5–14.5)
ERYTHROCYTE [DISTWIDTH] IN BLOOD BY AUTOMATED COUNT: 13.9 % (ref 11.5–14.5)
ERYTHROCYTE [SEDIMENTATION RATE] IN BLOOD BY WESTERGREN METHOD: 20 MM/H
ERYTHROCYTE [SEDIMENTATION RATE] IN BLOOD BY WESTERGREN METHOD: 20 MM/H
EST. GFR  (AFRICAN AMERICAN): 33 ML/MIN/1.73 M^2
EST. GFR  (AFRICAN AMERICAN): 33 ML/MIN/1.73 M^2
EST. GFR  (NON AFRICAN AMERICAN): 28.7 ML/MIN/1.73 M^2
EST. GFR  (NON AFRICAN AMERICAN): 28.7 ML/MIN/1.73 M^2
FIO2: 50
FIO2: 50
FRACTIONAL SHORTENING: 38 % (ref 28–44)
GLUCOSE SERPL-MCNC: 102 MG/DL (ref 70–110)
GLUCOSE SERPL-MCNC: 141 MG/DL (ref 70–110)
HCO3 UR-SCNC: 15.1 MMOL/L (ref 24–28)
HCO3 UR-SCNC: 16 MMOL/L (ref 24–28)
HCT VFR BLD AUTO: 24.1 % (ref 37–48.5)
HCT VFR BLD AUTO: 25.2 % (ref 37–48.5)
HCT VFR BLD AUTO: 26.5 % (ref 37–48.5)
HGB BLD-MCNC: 8.2 G/DL (ref 12–16)
HGB BLD-MCNC: 8.2 G/DL (ref 12–16)
HGB BLD-MCNC: 8.9 G/DL (ref 12–16)
IMM GRANULOCYTES # BLD AUTO: 0.16 K/UL (ref 0–0.04)
IMM GRANULOCYTES # BLD AUTO: ABNORMAL K/UL (ref 0–0.04)
IMM GRANULOCYTES # BLD AUTO: ABNORMAL K/UL (ref 0–0.04)
IMM GRANULOCYTES NFR BLD AUTO: 1.7 % (ref 0–0.5)
IMM GRANULOCYTES NFR BLD AUTO: ABNORMAL % (ref 0–0.5)
IMM GRANULOCYTES NFR BLD AUTO: ABNORMAL % (ref 0–0.5)
INR PPP: 2.1 (ref 0.8–1.2)
INTERVENTRICULAR SEPTUM: 0.74 CM (ref 0.6–1.1)
LA MAJOR: 4.62 CM
LA MINOR: 4.29 CM
LA WIDTH: 4.18 CM
LACTATE SERPL-SCNC: 2.8 MMOL/L (ref 0.5–2.2)
LACTATE SERPL-SCNC: 5.5 MMOL/L (ref 0.5–2.2)
LACTATE SERPL-SCNC: 7.5 MMOL/L (ref 0.5–2.2)
LACTATE SERPL-SCNC: 8.8 MMOL/L (ref 0.5–2.2)
LEFT ATRIUM SIZE: 2.99 CM
LEFT ATRIUM VOLUME INDEX: 26.6 ML/M2
LEFT ATRIUM VOLUME: 47.26 CM3
LEFT INTERNAL DIMENSION IN SYSTOLE: 3.23 CM (ref 2.1–4)
LEFT VENTRICLE DIASTOLIC VOLUME INDEX: 72.51 ML/M2
LEFT VENTRICLE DIASTOLIC VOLUME: 129 ML
LEFT VENTRICLE MASS INDEX: 74.4 G/M2
LEFT VENTRICLE SYSTOLIC VOLUME INDEX: 23.6 ML/M2
LEFT VENTRICLE SYSTOLIC VOLUME: 41.98 ML
LEFT VENTRICULAR INTERNAL DIMENSION IN DIASTOLE: 5.19 CM (ref 3.5–6)
LEFT VENTRICULAR MASS: 132.29 G
LV LATERAL E/E' RATIO: 9.14
LV SEPTAL E/E' RATIO: 10.67
LYMPHOCYTES # BLD AUTO: 1.2 K/UL (ref 1–4.8)
LYMPHOCYTES NFR BLD: 12.3 % (ref 18–48)
LYMPHOCYTES NFR BLD: 7 % (ref 18–48)
LYMPHOCYTES NFR BLD: 8 % (ref 18–48)
MAGNESIUM SERPL-MCNC: 1.9 MG/DL (ref 1.6–2.6)
MCH RBC QN AUTO: 27.8 PG (ref 27–31)
MCH RBC QN AUTO: 28.7 PG (ref 27–31)
MCH RBC QN AUTO: 28.9 PG (ref 27–31)
MCHC RBC AUTO-ENTMCNC: 32.5 G/DL (ref 32–36)
MCHC RBC AUTO-ENTMCNC: 33.6 G/DL (ref 32–36)
MCHC RBC AUTO-ENTMCNC: 34 G/DL (ref 32–36)
MCV RBC AUTO: 85 FL (ref 82–98)
MCV RBC AUTO: 85 FL (ref 82–98)
MCV RBC AUTO: 86 FL (ref 82–98)
METAMYELOCYTES NFR BLD MANUAL: 1 %
MIN VOL: 9.1
MODE: ABNORMAL
MODE: ABNORMAL
MONOCYTES # BLD AUTO: 0.4 K/UL (ref 0.3–1)
MONOCYTES NFR BLD: 1 % (ref 4–15)
MONOCYTES NFR BLD: 3 % (ref 4–15)
MONOCYTES NFR BLD: 3.8 % (ref 4–15)
MV PEAK A VEL: 0.55 M/S
MV PEAK E VEL: 0.64 M/S
NEUTROPHILS # BLD AUTO: 7.8 K/UL (ref 1.8–7.7)
NEUTROPHILS NFR BLD: 78 % (ref 38–73)
NEUTROPHILS NFR BLD: 79 % (ref 38–73)
NEUTROPHILS NFR BLD: 80.6 % (ref 38–73)
NEUTS BAND NFR BLD MANUAL: 11 %
NEUTS BAND NFR BLD MANUAL: 7 %
NRBC BLD-RTO: 0 /100 WBC
OVALOCYTES BLD QL SMEAR: ABNORMAL
PCO2 BLDA: 27.1 MMHG (ref 35–45)
PCO2 BLDA: 31.5 MMHG (ref 35–45)
PEEP: 5
PEEP: 5
PH SMN: 7.31 [PH] (ref 7.35–7.45)
PH SMN: 7.35 [PH] (ref 7.35–7.45)
PHOSPHATE SERPL-MCNC: 3 MG/DL (ref 2.7–4.5)
PHOSPHATE SERPL-MCNC: 4.4 MG/DL (ref 2.7–4.5)
PIP: 38
PLATELET # BLD AUTO: 80 K/UL (ref 150–350)
PLATELET # BLD AUTO: 86 K/UL (ref 150–350)
PLATELET # BLD AUTO: 87 K/UL (ref 150–350)
PLATELET BLD QL SMEAR: ABNORMAL
PLATELET BLD QL SMEAR: ABNORMAL
PMV BLD AUTO: 11.4 FL (ref 9.2–12.9)
PMV BLD AUTO: 11.7 FL (ref 9.2–12.9)
PMV BLD AUTO: 11.8 FL (ref 9.2–12.9)
PO2 BLDA: 36 MMHG (ref 40–60)
PO2 BLDA: 91 MMHG (ref 80–100)
POC BE: -10 MMOL/L
POC BE: -10 MMOL/L
POC SATURATED O2: 64 % (ref 95–100)
POC SATURATED O2: 97 % (ref 95–100)
POC TCO2: 16 MMOL/L (ref 23–27)
POC TCO2: 17 MMOL/L (ref 24–29)
POCT GLUCOSE: 101 MG/DL (ref 70–110)
POCT GLUCOSE: 104 MG/DL (ref 70–110)
POCT GLUCOSE: 108 MG/DL (ref 70–110)
POCT GLUCOSE: 110 MG/DL (ref 70–110)
POCT GLUCOSE: 119 MG/DL (ref 70–110)
POCT GLUCOSE: 129 MG/DL (ref 70–110)
POCT GLUCOSE: 139 MG/DL (ref 70–110)
POCT GLUCOSE: 142 MG/DL (ref 70–110)
POCT GLUCOSE: 143 MG/DL (ref 70–110)
POCT GLUCOSE: 147 MG/DL (ref 70–110)
POCT GLUCOSE: 148 MG/DL (ref 70–110)
POCT GLUCOSE: 150 MG/DL (ref 70–110)
POCT GLUCOSE: 150 MG/DL (ref 70–110)
POCT GLUCOSE: 158 MG/DL (ref 70–110)
POCT GLUCOSE: 158 MG/DL (ref 70–110)
POCT GLUCOSE: 162 MG/DL (ref 70–110)
POCT GLUCOSE: 162 MG/DL (ref 70–110)
POCT GLUCOSE: 305 MG/DL (ref 70–110)
POCT GLUCOSE: 92 MG/DL (ref 70–110)
POCT GLUCOSE: 95 MG/DL (ref 70–110)
POCT GLUCOSE: 96 MG/DL (ref 70–110)
POCT GLUCOSE: 99 MG/DL (ref 70–110)
POIKILOCYTOSIS BLD QL SMEAR: SLIGHT
POIKILOCYTOSIS BLD QL SMEAR: SLIGHT
POLYCHROMASIA BLD QL SMEAR: ABNORMAL
POLYCHROMASIA BLD QL SMEAR: ABNORMAL
POTASSIUM SERPL-SCNC: 3.4 MMOL/L (ref 3.5–5.1)
POTASSIUM SERPL-SCNC: 4.2 MMOL/L (ref 3.5–5.1)
PROT SERPL-MCNC: 5.1 G/DL (ref 6–8.4)
PROTHROMBIN TIME: 19.9 SEC (ref 9–12.5)
RA MAJOR: 4.41 CM
RA WIDTH: 3.48 CM
RBC # BLD AUTO: 2.84 M/UL (ref 4–5.4)
RBC # BLD AUTO: 2.95 M/UL (ref 4–5.4)
RBC # BLD AUTO: 3.1 M/UL (ref 4–5.4)
SAMPLE: ABNORMAL
SAMPLE: ABNORMAL
SINUS: 2.97 CM
SITE: ABNORMAL
SITE: ABNORMAL
SODIUM SERPL-SCNC: 141 MMOL/L (ref 136–145)
SODIUM SERPL-SCNC: 144 MMOL/L (ref 136–145)
SP02: 97
SP02: 98
TDI LATERAL: 0.07
TDI SEPTAL: 0.06
TDI: 0.07
TOXIC GRANULES BLD QL SMEAR: PRESENT
TOXIC GRANULES BLD QL SMEAR: PRESENT
TRICUSPID ANNULAR PLANE SYSTOLIC EXCURSION: 1.99 CM
VT: 450
VT: 450
WBC # BLD AUTO: 10.22 K/UL (ref 3.9–12.7)
WBC # BLD AUTO: 9.62 K/UL (ref 3.9–12.7)
WBC # BLD AUTO: 9.73 K/UL (ref 3.9–12.7)
WBC TOXIC VACUOLES BLD QL SMEAR: PRESENT

## 2019-04-22 PROCEDURE — 25000003 PHARM REV CODE 250: Performed by: STUDENT IN AN ORGANIZED HEALTH CARE EDUCATION/TRAINING PROGRAM

## 2019-04-22 PROCEDURE — 83605 ASSAY OF LACTIC ACID: CPT | Mod: 91

## 2019-04-22 PROCEDURE — 94003 VENT MGMT INPAT SUBQ DAY: CPT

## 2019-04-22 PROCEDURE — 99291 CRITICAL CARE FIRST HOUR: CPT | Mod: GC,,, | Performed by: SURGERY

## 2019-04-22 PROCEDURE — 99291 PR CRITICAL CARE, E/M 30-74 MINUTES: ICD-10-PCS | Mod: GC,,, | Performed by: SURGERY

## 2019-04-22 PROCEDURE — 99223 1ST HOSP IP/OBS HIGH 75: CPT | Mod: ,,, | Performed by: INTERNAL MEDICINE

## 2019-04-22 PROCEDURE — 80053 COMPREHEN METABOLIC PANEL: CPT

## 2019-04-22 PROCEDURE — 37799 UNLISTED PX VASCULAR SURGERY: CPT

## 2019-04-22 PROCEDURE — 99233 SBSQ HOSP IP/OBS HIGH 50: CPT | Mod: ,,, | Performed by: NURSE PRACTITIONER

## 2019-04-22 PROCEDURE — 63600175 PHARM REV CODE 636 W HCPCS: Mod: JG | Performed by: STUDENT IN AN ORGANIZED HEALTH CARE EDUCATION/TRAINING PROGRAM

## 2019-04-22 PROCEDURE — 85027 COMPLETE CBC AUTOMATED: CPT

## 2019-04-22 PROCEDURE — 99499 NO LOS: ICD-10-PCS | Mod: ,,, | Performed by: PHYSICIAN ASSISTANT

## 2019-04-22 PROCEDURE — 25000003 PHARM REV CODE 250: Performed by: SURGERY

## 2019-04-22 PROCEDURE — P9045 ALBUMIN (HUMAN), 5%, 250 ML: HCPCS | Mod: JG | Performed by: STUDENT IN AN ORGANIZED HEALTH CARE EDUCATION/TRAINING PROGRAM

## 2019-04-22 PROCEDURE — 82803 BLOOD GASES ANY COMBINATION: CPT

## 2019-04-22 PROCEDURE — 99900026 HC AIRWAY MAINTENANCE (STAT)

## 2019-04-22 PROCEDURE — 87449 NOS EACH ORGANISM AG IA: CPT

## 2019-04-22 PROCEDURE — 99233 PR SUBSEQUENT HOSPITAL CARE,LEVL III: ICD-10-PCS | Mod: ,,, | Performed by: NURSE PRACTITIONER

## 2019-04-22 PROCEDURE — 83735 ASSAY OF MAGNESIUM: CPT

## 2019-04-22 PROCEDURE — 63600175 PHARM REV CODE 636 W HCPCS: Performed by: SURGERY

## 2019-04-22 PROCEDURE — 27200966 HC CLOSED SUCTION SYSTEM

## 2019-04-22 PROCEDURE — 63600175 PHARM REV CODE 636 W HCPCS: Performed by: ANESTHESIOLOGY

## 2019-04-22 PROCEDURE — 25000003 PHARM REV CODE 250: Performed by: ANESTHESIOLOGY

## 2019-04-22 PROCEDURE — 94761 N-INVAS EAR/PLS OXIMETRY MLT: CPT

## 2019-04-22 PROCEDURE — 63600175 PHARM REV CODE 636 W HCPCS: Performed by: STUDENT IN AN ORGANIZED HEALTH CARE EDUCATION/TRAINING PROGRAM

## 2019-04-22 PROCEDURE — C9113 INJ PANTOPRAZOLE SODIUM, VIA: HCPCS | Performed by: ANESTHESIOLOGY

## 2019-04-22 PROCEDURE — 99499 UNLISTED E&M SERVICE: CPT | Mod: ,,, | Performed by: PHYSICIAN ASSISTANT

## 2019-04-22 PROCEDURE — 84100 ASSAY OF PHOSPHORUS: CPT

## 2019-04-22 PROCEDURE — 83605 ASSAY OF LACTIC ACID: CPT

## 2019-04-22 PROCEDURE — 20000000 HC ICU ROOM

## 2019-04-22 PROCEDURE — 27000221 HC OXYGEN, UP TO 24 HOURS

## 2019-04-22 PROCEDURE — 99223 PR INITIAL HOSPITAL CARE,LEVL III: ICD-10-PCS | Mod: ,,, | Performed by: INTERNAL MEDICINE

## 2019-04-22 PROCEDURE — 99223 1ST HOSP IP/OBS HIGH 75: CPT | Mod: ,,, | Performed by: NURSE PRACTITIONER

## 2019-04-22 PROCEDURE — 99900035 HC TECH TIME PER 15 MIN (STAT)

## 2019-04-22 PROCEDURE — 85025 COMPLETE CBC W/AUTO DIFF WBC: CPT

## 2019-04-22 PROCEDURE — 85007 BL SMEAR W/DIFF WBC COUNT: CPT

## 2019-04-22 PROCEDURE — 63600175 PHARM REV CODE 636 W HCPCS: Performed by: PHYSICIAN ASSISTANT

## 2019-04-22 PROCEDURE — 80069 RENAL FUNCTION PANEL: CPT

## 2019-04-22 PROCEDURE — 99223 PR INITIAL HOSPITAL CARE,LEVL III: ICD-10-PCS | Mod: ,,, | Performed by: NURSE PRACTITIONER

## 2019-04-22 PROCEDURE — 85610 PROTHROMBIN TIME: CPT

## 2019-04-22 RX ORDER — PANTOPRAZOLE SODIUM 40 MG/10ML
40 INJECTION, POWDER, LYOPHILIZED, FOR SOLUTION INTRAVENOUS DAILY
Status: DISCONTINUED | OUTPATIENT
Start: 2019-04-22 | End: 2019-05-05

## 2019-04-22 RX ORDER — GLYCOPYRROLATE 0.2 MG/ML
0.2 INJECTION INTRAMUSCULAR; INTRAVENOUS ONCE
Status: COMPLETED | OUTPATIENT
Start: 2019-04-22 | End: 2019-04-22

## 2019-04-22 RX ORDER — POTASSIUM CHLORIDE 29.8 MG/ML
40 INJECTION INTRAVENOUS
Status: DISCONTINUED | OUTPATIENT
Start: 2019-04-22 | End: 2019-05-05

## 2019-04-22 RX ORDER — POTASSIUM CHLORIDE 14.9 MG/ML
60 INJECTION INTRAVENOUS
Status: DISCONTINUED | OUTPATIENT
Start: 2019-04-22 | End: 2019-05-05

## 2019-04-22 RX ORDER — CHLORHEXIDINE GLUCONATE ORAL RINSE 1.2 MG/ML
15 SOLUTION DENTAL 2 TIMES DAILY
Status: DISCONTINUED | OUTPATIENT
Start: 2019-04-22 | End: 2019-05-07

## 2019-04-22 RX ORDER — POTASSIUM CHLORIDE 29.8 MG/ML
80 INJECTION INTRAVENOUS
Status: DISCONTINUED | OUTPATIENT
Start: 2019-04-22 | End: 2019-05-05

## 2019-04-22 RX ORDER — ALBUMIN HUMAN 50 G/1000ML
25 SOLUTION INTRAVENOUS ONCE
Status: COMPLETED | OUTPATIENT
Start: 2019-04-22 | End: 2019-04-22

## 2019-04-22 RX ORDER — MAGNESIUM SULFATE HEPTAHYDRATE 40 MG/ML
4 INJECTION, SOLUTION INTRAVENOUS
Status: DISCONTINUED | OUTPATIENT
Start: 2019-04-22 | End: 2019-05-05

## 2019-04-22 RX ORDER — MAGNESIUM SULFATE HEPTAHYDRATE 40 MG/ML
2 INJECTION, SOLUTION INTRAVENOUS
Status: DISCONTINUED | OUTPATIENT
Start: 2019-04-22 | End: 2019-05-05

## 2019-04-22 RX ADMIN — VASOPRESSIN 0.04 UNITS/MIN: 20 INJECTION INTRAVENOUS at 08:04

## 2019-04-22 RX ADMIN — SODIUM CHLORIDE: 0.9 INJECTION, SOLUTION INTRAVENOUS at 03:04

## 2019-04-22 RX ADMIN — POTASSIUM CHLORIDE 60 MEQ: 200 INJECTION, SOLUTION INTRAVENOUS at 08:04

## 2019-04-22 RX ADMIN — VASOPRESSIN 0.04 UNITS/MIN: 20 INJECTION INTRAVENOUS at 12:04

## 2019-04-22 RX ADMIN — PROPOFOL 15 MCG/KG/MIN: 10 INJECTION, EMULSION INTRAVENOUS at 08:04

## 2019-04-22 RX ADMIN — PIPERACILLIN AND TAZOBACTAM 4.5 G: 4; .5 INJECTION, POWDER, LYOPHILIZED, FOR SOLUTION INTRAVENOUS; PARENTERAL at 08:04

## 2019-04-22 RX ADMIN — ALBUMIN HUMAN 25 G: 0.05 INJECTION, SOLUTION INTRAVENOUS at 01:04

## 2019-04-22 RX ADMIN — SODIUM CHLORIDE: 0.9 INJECTION, SOLUTION INTRAVENOUS at 06:04

## 2019-04-22 RX ADMIN — PIPERACILLIN AND TAZOBACTAM 4.5 G: 4; .5 INJECTION, POWDER, LYOPHILIZED, FOR SOLUTION INTRAVENOUS; PARENTERAL at 01:04

## 2019-04-22 RX ADMIN — SODIUM CHLORIDE, SODIUM LACTATE, POTASSIUM CHLORIDE, AND CALCIUM CHLORIDE 500 ML: .6; .31; .03; .02 INJECTION, SOLUTION INTRAVENOUS at 01:04

## 2019-04-22 RX ADMIN — SODIUM CHLORIDE 1000 ML: 0.9 INJECTION, SOLUTION INTRAVENOUS at 03:04

## 2019-04-22 RX ADMIN — PANTOPRAZOLE SODIUM 40 MG: 40 INJECTION, POWDER, LYOPHILIZED, FOR SOLUTION INTRAVENOUS at 08:04

## 2019-04-22 RX ADMIN — ALBUMIN HUMAN 25 G: 0.05 INJECTION, SOLUTION INTRAVENOUS at 10:04

## 2019-04-22 RX ADMIN — PROPOFOL 15 MCG/KG/MIN: 10 INJECTION, EMULSION INTRAVENOUS at 06:04

## 2019-04-22 RX ADMIN — FENTANYL CITRATE 50 MCG: 50 INJECTION INTRAMUSCULAR; INTRAVENOUS at 10:04

## 2019-04-22 RX ADMIN — GLYCOPYRROLATE 0.2 MG: 0.2 INJECTION INTRAMUSCULAR; INTRAVENOUS at 10:04

## 2019-04-22 RX ADMIN — ERTAPENEM 1 G: 1 INJECTION INTRAMUSCULAR; INTRAVENOUS at 03:04

## 2019-04-22 RX ADMIN — CHLORHEXIDINE GLUCONATE 0.12% ORAL RINSE 15 ML: 1.2 LIQUID ORAL at 08:04

## 2019-04-22 RX ADMIN — SODIUM CHLORIDE 1000 ML: 0.9 INJECTION, SOLUTION INTRAVENOUS at 06:04

## 2019-04-22 NOTE — PLAN OF CARE
Patient remains on ventilator, mode currently on A/C, 50% PEEP 5. Patient's lactic trending down but remains critical. Patient on levo, vaso, propofol for sedation. Insulin gtt titrated per hourly accu-check. Patient follows commands on sedation vacation. Patient had BMx1, turned Q2. Patient received 4L fluid during shift. Fontenot in place, UOP adequate, approx. 50-60 cc per hour. Flotrac for hemodynamic parameters. Nephrostomy and MADHURI in place. Family at bedside, participating in care and educated on plan.

## 2019-04-22 NOTE — PROGRESS NOTES
[facility]  Urology  Progress Note    Patient Name: Mariann Huff  MRN: [id number]  Admission Date: 4/20/2019  Hospital Length of Stay: 2 days  Code Status: Full Code   Attending Provider: Florin Puckett MD   Primary Care Physician: Jasbir Haney MD    Subjective:     HPI:  Mariann Huff is a 59 y.o. female with history of HTN, type 2 diabetes mellitus, CAD, NSTEMI, and back pain who presents to [facility] [place] with altered mental status and sepsis. She underwent L5-S1 OLIF with NSGY on 4/12 and had intraoperative left ureteral injury with ureteroureteral anastomosis and ureteral stent placement. She did well initially and had correa and MADHURI drain removed on 4/16. She began having chills and altered mental status 2 days ago and this has progressively worsened. No complaints of pain.     She is altered and HPI is limited. In the ED she is febrile to 103 and tachycardic and pressures are low normal. WBC is 5, creatinine is 3.6 baseline 1.0, lactate is 4.6. Cath UA concerning for infection, 3+ LE, >100 WBCs, and many bacteria on microscopy.    CT and MRI abdomen both show air with minimal fluid near the surgical site with left ureter coursing through. There is air throughout the proximal collecting system which is decompressed with JJ ureteral stent in good position.    Taken for ex lap, ligation of left ureter and left neph tube placement on 4/21/19.    Interval History:   Pressor requirements down on 0.01 of levo, and 0.04 of vaso. Lactate still elevated, at 7.5 today. Shock liver. Blood and urine with GNRs, urine with presumptive E. Coli.    Review of Systems  Objective:     Temp:  [98.2 °F (36.8 °C)-99.2 °F (37.3 °C)] 99.2 °F (37.3 °C)  Pulse:  [75-97] 75  Resp:  [0-35] 20  SpO2:  [92 %-100 %] 98 %  BP: ()/(56-70) 97/61  Arterial Line BP: ()/() 104/54     Body mass index is 27.46 kg/m².           Drains     Drain                 Closed/Suction Drain 04/21/19 0300 LLQ 1 day         NG/OG  Tube 04/21/19 0728 Center mouth 1 day         Nephrostomy 04/21/19 0629 Left 8 Fr. 1 day         Urethral Catheter 04/20/19 1711 Latex 16 Fr. 1 day         Rectal Tube 04/21/19 2100 rectal tube w/ balloon (indicate number of mLs) less than 1 day                Physical Exam   Constitutional: She appears well-developed and well-nourished. She is sedated and intubated.   HENT:   Head: Normocephalic and atraumatic.   Neck: No JVD present.   Cardiovascular: Normal rate and regular rhythm.    Pulmonary/Chest: She is intubated.   mechanically ventilated   Abdominal: Soft. She exhibits distension. There is no rebound and no guarding.   Dressing c/d/i  MADHURI drain with minimal SS output   Genitourinary:   Genitourinary Comments: Correa draining clear yellow  Left neph tube with clear pink urine   Neurological:   Sedated   Skin: She is not diaphoretic. No pallor.       Significant Labs:    BMP:  Recent Labs   Lab 04/21/19  1210 04/21/19  1918 04/22/19  0423    142 141   K 4.8 4.6 4.2    115* 109   CO2 13* 11* 16*   BUN 52* 44* 44*   CREATININE 2.6* 1.9* 1.9*   CALCIUM 8.5* 7.1* 7.9*       CBC:   Recent Labs   Lab 04/21/19  1820 04/22/19  0100 04/22/19  0423   WBC 8.98 10.22 9.62   HGB 8.7* 8.9* 8.2*   HCT 27.5* 26.5* 25.2*   PLT 86* 87* 80*       All pertinent labs results from the past 24 hours have been reviewed.    Significant Imaging:  All pertinent imaging results/findings from the past 24 hours have been reviewed.      Assessment/Plan:     * Ureteral transection of left native kidney  Mariann Huff is a 59 y.o. female s/p left ureteral injury on 4/12, presented with fever, AMS, and intraabdominal abscess, taken for washout and ligation of left ureter with neph tube placement on 4/21    - Management per SICU, appreciate assistance   - Continue broad spectrum abx, follow up intraop cultures. Urine cultures growing presumptive E coli  - continue weaning pressors and fluid resuscitation  - Maintain correa and  neph tube at this time  - Vent per SICU    Sepsis  As above        VTE Risk Mitigation (From admission, onward)        Ordered     Place sequential compression device  Until discontinued      04/20/19 2108     IP VTE HIGH RISK PATIENT  Once      04/20/19 2108          Hugh Higuera MD  Urology  [facility]

## 2019-04-22 NOTE — CONSULTS
[facility]  Infectious Disease  Consult Note    Patient Name: Mariann Huff  MRN: [id number]  Admission Date: 4/20/2019  Hospital Length of Stay: 2 days  Attending Physician: Paul Carlson MD  Primary Care Provider: Jasbir Haney MD     Isolation Status: Contact     Consults  Assessment/Plan:     Sepsis  58 y/o female with HTN, DMII, CAD, NSTEMI, s/p L5-S1 OLIF with NSGY 4/12 c/b intraoperative left ureteral injury and underwent ureteroureteral anastomoses and ureteral stent placement. She was discharged with a correa and MADHURI drain that was removed on 4/16. She was seen by urology and anticipated stent removal in 2-3 months (6/2019).  She presents to ED with AMS and sepsis found to have air and fluid collection of left anterior prevertebral soft tissue with left ureter involvement. She underwent ex-lap and washout on 4/21. She is found to have E.coli bacteremia. We are consulted for abx recommendations.      MRI: Postsurgical change of L5-S1 posterior spinal fusion and anterior interbody fusion with a 4.4 cm fluid fluid collection in the left anterior prevertebral soft tissues at the level of the L5-S1 disc space.Irregular flow void involving the right common iliac vein, underlying thrombus not excluded.     CT: air collection within the anterior paraspinous soft tissues through which the left ureter courses. Given that the ureter courses through this, communication of the ureter with this collection is not excluded. Air within the left renal collecting system, along the left ureter, and within the urinary bladder     Per op note,  There were pocket of purulence noted and evacuated, sent for culture of left retroperitoneum to pole of left kidney. The stent was visible. Posterior to the stent there was a pocket of purulent fluid and exposed hardware along the spinal vertebrae. The tissue along the distal and proximal end of ureter were friable and unhealthy. She underwent left nephrostomy tube  placement. The stent was removed and several metal clips were placed on proximal end of the ureteral.     Blood cultures +E.coli Repeat blood cultures are positive.   Urine cultures +E.coli  C.diff negative       1. Discontinue zosyn, started ertapenem 1g daily pending susceptibilities as concerns for .    2. Repeat blood cultures are positive. Repeat blood cultures tomorrow AM  3. Surgical cultures pending. Will follow  4. Contact isolation status.   5. Will tailor abx accordingly       Thank you for your consult. I will follow-up with patient. Please contact us if you have any additional questions.    August Mars PA-C  Infectious Disease  [facility]  [phone]    Subjective:     Principal Problem: Ureteral transection of left native kidney    HPI: 60 y/o female with HTN, DMII, CAD, NSTEMI, s/p L5-S1 OLIF with NSGY 4/12 sustained intraoperative laceration and underwent ureteroureteral anastomoses and ureteral stent placement. She was discharged with a correa and MADHURI drain that was removed on 4/16. She was seen by urology and anticipated stent removal in 2-3 months (6/2019).  She presents to ED with AMS and sepsis. She was febrile 103 in ED. WBC 5K on admit. Lactate 4.6. Cath UA with 3+leukocytes, >100 WBcs and many bacteria.     MRI: Postsurgical change of L5-S1 posterior spinal fusion and anterior interbody fusion with a 4.4 cm fluid fluid collection in the left anterior prevertebral soft tissues at the level of the L5-S1 disc space.  Findings may represent postoperative seroma or evolving hematoma however, urinoma not excluded.  No definite abscess although correlation is advised.    Irregular flow void involving the right common iliac vein, underlying thrombus not excluded.      CT: air collection within the anterior paraspinous soft tissues through which the left ureter courses. Given that the ureter courses through this, communication of the ureter with this collection is not excluded.   There is a ureteral stent within the left ureter.Punctate foci of air in L5-S1.  2. Air within the left renal collecting system, along the left ureter, and within the urinary bladder, correlation advised.    This was not amendable for drainage by IR so she was taken to OR for washout.     She underwent ex-lap of left ureter and left nephrostomy tube placement 19.   Per op note,  There were pocket of purulence noted and evacuated, sent for culture of left retroperitoneum to pole of left kidney. The stent was visible. Posterior to the stent there was a pocket of purulent fluid and exposed hardware along the spinal vertebrae. The tissue along the distal and proximal end of ureter were friable and unhealthy. She underwent left nephrostomy tube placement. The stent was removed and several metal clips were placed on proximal end of the ureteral. MADHURI drain was placed into left retroperitoneal.     Blood cultures here are positive for GNR. Repeat blood culturse are positive.   Urine cultures +E.coli  She is currently on zosyn. She is in the ICU, intubated, sedated    Past Medical History:   Diagnosis Date    Anticoagulant long-term use     Asthma     Back pain     CAD (coronary artery disease)     s/p stentimg  (2), (1)    Carotid artery stenosis     Diabetes mellitus type 2 in obese     HTN (hypertension), benign     Hyperlipidemia     Keloid cicatrix     NPDR (nonproliferative diabetic retinopathy) 2015    NSTEMI (non-ST elevated myocardial infarction)     Nuclear sclerosis - Right Eye 3/18/2014    Primary localized osteoarthrosis, lower leg 2014    Sleep apnea        Past Surgical History:   Procedure Laterality Date    CARDIAC CATHETERIZATION      cataract extraction left eye      cataracts      CATHETERIZATION, HEART, LEFT Left 2014    Performed by [facility] Kim [facility] at [facility] CATH LAB     SECTION, LOW TRANSVERSE      CORONARY ANGIOPLASTY      Creation,  Nephrostomy, Percutaneous Left 4/21/2019    Performed by Karina Surgeon at [facility] KARINA    ESOPHAGOGASTRODUODENOSCOPY (EGD) N/A 8/12/2016    Performed by Gardenia Adamson MD at [facility] ENDO (4TH FLR)    EXCISION TURBINATE, SUBMUCOUS      FUSION, SPINE, LUMBAR, ANTERIOR APPROACH Left L5-S1 Anterior to Psoa Interbody Fusion, L5-S1 Posterior Instrumentation Left 4/12/2019    Performed by Mk George MD at [facility] OR 2ND FLR    HAND SURGERY Left     HAND SURGERY Right     torn ligament repair/ Dr. Yeboah    HYSTERECTOMY      Injection,steroid,epidural,transforaminal approach - Bilateral - S1 Bilateral 9/25/2018    Performed by Tl Abreu MD at [facility] PAIN MGT    Injection-steroid-epidural-caudal N/A 2/7/2019    Performed by Dave Bentley Jr., MD at [facility] PAIN MGT    INSERTION-INTRAOCULAR LENS (IOL) Right 9/1/2015    Performed by Good Domingo MD at [facility] OR 1ST FLR    left foot surgery      left wrist surgery      NASAL SEPTUM SURGERY  5/7/15    PHACOEMULSIFICATION-ASPIRATION-CATARACT Right 9/1/2015    Performed by Good Domingo MD at [facility] OR 1ST FLR    REPAIR, URETER  4/12/2019    Performed by Mk George MD at [facility] OR 2ND FLR    RESECTION-TURBINATES (SMR) N/A 5/7/2015    Performed by Dileep Dubois III, MD at [facility] OR 2ND [facility]    rt elbow surgery      S/P LAD COATED STENT  05/14/2010    6 total     S/P OM1 STENT  08/2003    SEPTOPLASTY N/A 5/7/2015    Performed by Dileep Dubois III, MD at [facility] OR 2ND FLR    SINUS SURGERY      F.E.S.S.    SINUS SURGERY FUNCTIONAL ENDOSCOPIC WITH NAVIGATION WITH MAXILLARIES, ETHMOIDS, LEFT SPHENOID, LEFT LOLY N/A 5/7/2015    Performed by Dileep Dubois III, MD at [facility] OR 2ND [facility]    STENT, URETERAL placement  4/12/2019    Performed by Robin Boyd MD at [facility] OR [address] [place] [address]    TUBAL LIGATION         Review of patient's allergies indicates:  No Known Allergies    Medications:  Medications Prior to Admission   Medication Sig    albuterol  "(PROVENTIL/VENTOLIN HFA) 90 mcg/actuation inhaler Inhale 2 puffs into the lungs every 6 (six) hours as needed for Wheezing.    amLODIPine (NORVASC) 10 MG tablet TAKE 1 TABLET (10 MG TOTAL) BY MOUTH ONCE DAILY.    aspirin 81 mg Tab Take 81 mg by mouth. 1 Tablet Oral Every day    atorvastatin (LIPITOR) 40 MG tablet TAKE 1 TABLET (40 MG TOTAL) BY MOUTH ONCE DAILY.    ACCU-CHEK EDIN PLUS METER [facility]     BD INSULIN PEN NEEDLE UF MINI 31 x 3/16 " Ndle USE 4 TIMES A DAY    blood sugar diagnostic (ACCU-CHEK EDIN PLUS TEST STRP) Strp TEST BLOOD SUGARS 4 TIMES DAILY    chlorthalidone (HYGROTEN) 50 MG Tab Take 1 tablet (50 mg total) by mouth once daily.    cyclobenzaprine (FLEXERIL) 10 MG tablet TAKE 1 TABLET BY MOUTH 3 TIMES A DAY AS NEEDED FOR MUSCLE SPASMS. NO WORKING OR DRIVING ON THIS MED    esomeprazole (NEXIUM) 40 MG capsule TAKE 1 CAPSULE (40 MG TOTAL) BY MOUTH BEFORE BREAKFAST.    fenofibrate micronized (LOFIBRA) 134 MG Cap TAKE 1 CAPSULE (134 MG TOTAL) BY MOUTH BEFORE BREAKFAST.    flash glucose scanning reader (FREESTYLE MISTI 14 DAY READER) Misc 1 each by Misc.(Non-Drug; Combo Route) route once daily.    flash glucose sensor (FREESTYLE MISTI 14 DAY SENSOR) Kit 1 each by Misc.(Non-Drug; Combo Route) route once daily.    gabapentin (NEURONTIN) 100 MG capsule Take 2 capsules (200 mg total) by mouth every evening.    insulin aspart U-100 (NOVOLOG FLEXPEN U-100 INSULIN) 100 unit/mL InPn pen INJECT 35 U AM, 45 U AT NOON, 35 U PM.150-200 +2, 201-250 +4, 251-300 +6, 301-350 +8, >350 +10    insulin glargine, TOUJEO, (TOUJEO SOLOSTAR U-300 INSULIN) 300 unit/mL (1.5 mL) InPn pen Inject 50 Units into the skin 2 (two) times daily.    INVOKANA 300 mg Tab tablet Take 1 tablet (300 mg total) by mouth once daily.    irbesartan (AVAPRO) 300 MG tablet Take 1 tablet (300 mg total) by mouth every evening.    lancets 30 gauge Misc     metoprolol succinate (TOPROL-XL) 100 MG 24 hr tablet TAKE 1 TABLET (100 MG TOTAL) " "BY MOUTH ONCE DAILY.    nitroGLYCERIN (NITROSTAT) 0.4 MG SL tablet Take one every 2-3 min till chestpain relief for 3 times and if still no relief, call MD or come to ED    omega-3 acid ethyl esters (LOVAZA) 1 gram capsule Take 1 g by mouth once daily.     pen needle, diabetic (BD ULTRA-FINE GRABIEL PEN NEEDLES) 32 gauge x 5/32" Ndle For use with insulin pens daily 4 times a day    prasugrel (EFFIENT) 10 mg Tab TAKE 1 TABLET (10 MG TOTAL) BY MOUTH ONCE DAILY.    tamsulosin (FLOMAX) 0.4 mg Cap Take 1 capsule (0.4 mg total) by mouth every evening.    tramadol (ULTRAM) 50 mg tablet Take 1 tablet (50 mg total) by mouth 3 (three) times daily as needed for Pain.    TRULICITY 1.5 mg/0.5 mL PnIj INJECT 1.5 MG INTO THE SKIN EVERY 7 DAYS.    zolpidem (AMBIEN) 5 MG Tab Take 1 tablet (5 mg total) by mouth nightly as needed.     Antibiotics (From admission, onward)    Start     Stop Route Frequency Ordered    04/22/19 0900  piperacillin-tazobactam 4.5 g in sodium chloride 0.9% 100 mL IVPB (ready to mix system)      -- IV Every 8 hours (non-standard times) 04/22/19 0804        Antifungals (From admission, onward)    None        Antivirals (From admission, onward)    None           Immunization History   Administered Date(s) Administered    Influenza 11/19/2009    Influenza - Quadrivalent 10/10/2014, 10/09/2015, 10/24/2016    Influenza - Quadrivalent - PF 11/27/2017    Influenza A (H1N1) 2009 Monovalent - IM 11/19/2009    Pneumococcal Polysaccharide - 23 Valent 06/19/2014    Td - PF (ADULT) 04/17/2017       Family History     Problem Relation (Age of Onset)    Diabetes Mother, Father, Sister, Brother, Sister    Heart attack Father    Heart disease Mother    Leukemia Father    No Known Problems Sister, Brother, Brother, Maternal Grandmother, Maternal Grandfather, Paternal Grandmother, Paternal Grandfather, Son, Son, Maternal Aunt, Maternal Uncle, Paternal Aunt, Paternal Uncle        Social History     Socioeconomic " History    Marital status:      Spouse name: Shamir    Number of children: 2    Years of education: Not on file    Highest education level: Not on file   Occupational History    Occupation: [employer]     Employer: [employer]     Employer: [employer]     Employer: [employer]   Social Needs    Financial resource strain: Not on file    Food insecurity:     Worry: Not on file     Inability: Not on file    Transportation needs:     Medical: Not on file     Non-medical: Not on file   Tobacco Use    Smoking status: Never Smoker    Smokeless tobacco: Never Used   Substance and Sexual Activity    Alcohol use: No     Alcohol/week: 0.0 oz    Drug use: No    Sexual activity: Yes     Partners: Male     Birth control/protection: Post-menopausal     Comment:    Lifestyle    Physical activity:     Days per week: Not on file     Minutes per session: Not on file    Stress: Not on file   Relationships    Social connections:     Talks on phone: Not on file     Gets together: Not on file     Attends Muslim service: Not on file     Active member of club or organization: Not on file     Attends meetings of clubs or organizations: Not on file     Relationship status: Not on file   Other Topics Concern    Are you pregnant or think you may be? No    Breast-feeding No   Social History Narrative    . 2 children.      Review of Systems   Unable to perform ROS: Intubated     Objective:     Vital Signs (Most Recent):  Temp: 99.3 °F (37.4 °C) (04/22/19 1100)  Pulse: 80 (04/22/19 1415)  Resp: 20 (04/22/19 1415)  BP: 112/61 (04/22/19 1400)  SpO2: 100 % (04/22/19 1415) Vital Signs (24h Range):  Temp:  [99.2 °F (37.3 °C)-99.3 °F (37.4 °C)] 99.3 °F (37.4 °C)  Pulse:  [72-91] 80  Resp:  [0-37] 20  SpO2:  [83 %-100 %] 100 %  BP: ()/(55-70) 112/61  Arterial Line BP: ()/() 129/58     Weight: 72.6 kg (160 lb)  Body mass index is 27.46 kg/m².    Estimated  Creatinine Clearance: 31.5 mL/min (A) (based on SCr of 1.9 mg/dL (H)).    Physical Exam   Constitutional: She appears well-developed and well-nourished. No distress.       intubated   HENT:   Head: Normocephalic.   Cardiovascular: Normal rate, regular rhythm and normal heart sounds.   No murmur heard.  Pulmonary/Chest: Effort normal. No stridor. No respiratory distress.   Abdominal: Soft. She exhibits no distension.   Rectal tube with watery stool output  Nephrostomy drain in place   Musculoskeletal: She exhibits no edema or deformity.   Neurological:   arousal with verbal command   Skin: Skin is warm and dry. She is not diaphoretic. No erythema. No pallor.   Psychiatric: She has a normal mood and affect.   Vitals reviewed.      Significant Labs:   Blood Culture:   Recent Labs   Lab 04/20/19  1705 04/20/19  1711 04/21/19  1835   LABBLOO Gram stain aer bottle: Gram negative rods  Gram stain flaco bottle: Gram negative rods   Positive results previously called 04/21/2019  06:36  ESCHERICHIA COLI  Susceptibility pending   Gram stain aer bottle: Gram negative rods  Gram stain flaco bottle: Gram negative rods   Results called to and read back by: Pancho Mars RN  04/21/2019  03:18  ESCHERICHIA COLI  For susceptibility see order #[id number]   Gram stain aer bottle: Gram negative rods  Results called to and read back by: Carmenza Wallace RN 04/22/2019  10:14     C4 Count: No results for input(s): C4 in the last 48 hours.  CBC:   Recent Labs   Lab 04/22/19  0100 04/22/19  0423 04/22/19  1207   WBC 10.22 9.62 9.73   HGB 8.9* 8.2* 8.2*   HCT 26.5* 25.2* 24.1*   PLT 87* 80* 86*     Respiratory Culture: No results for input(s): GSRESP, RESPIRATORYC in the last 4320 hours.  Urine Culture:   Recent Labs   Lab 04/20/19  1711 04/21/19  0640   LABURIN PRESUMPTIVE E COLI  >100,000 cfu/ml  Identification and susceptibility pending   No growth     Urine Studies:   Recent Labs   Lab 04/21/19  7016   COLORU Argelia   APPEARANCEUA Cloudy*    PHUR 5.0   SPECGRAV 1.020   PROTEINUA 1+*   GLUCUA 3+*   KETONESU Negative   BILIRUBINUA Negative   OCCULTUA 3+*   NITRITE Negative   LEUKOCYTESUR 1+*   RBCUA 14*   WBCUA 51*   BACTERIA Moderate*   SQUAMEPITHEL 3   HYALINECASTS 3*     Wound Culture:   Recent Labs   Lab 04/21/19  0125   LABAERO Further report to follow     All pertinent labs within the past 24 hours have been reviewed.    Significant Imaging: I have reviewed all pertinent imaging results/findings within the past 24 hours.

## 2019-04-22 NOTE — ASSESSMENT & PLAN NOTE
ABG (prior to bicarb administration this evening): 7.356/20.9/11.7    In short, mild acidemia with a primary metabolic acidosis, expected pCO2 would be 26 +/-2, hence this patient with a pCO2 of 20.9 also has a mild secondary metabolic alkalosis    Etiology for metabolic component appears to be a probably mostly type A lactic acidosis and secondary poor perfusion / shock, etiology for the alkalosis component is the vent settings    Recommendations:  -would not recommend RRT for an elevated lactic acid in isolation  -further would not recommend giving bicarb for a lactic acidosis with a pH > 7.2  -recommend continuing working on maintaining hemodynamics

## 2019-04-22 NOTE — SUBJECTIVE & OBJECTIVE
Interval History:   Kidney function remains stable from yesterday w/o severe electrolyte abnormalities. She is net positive ~ 10L/24h (2.2L of UOP).  Bicarb remains low at 16, pH of 7.35.  Lactate trending down to 7.5 from 9.2.  CVP recorded ~ 21.    Weaned from Levo this morning, remains on vaso for BP support.  Minimal FiO2 requirements.    Review of patient's allergies indicates:  No Known Allergies  Current Facility-Administered Medications   Medication Frequency    0.9%  NaCl infusion Continuous    albuterol-ipratropium 2.5 mg-0.5 mg/3 mL nebulizer solution 3 mL Q4H PRN    chlorhexidine 0.12 % solution 15 mL BID    fentaNYL injection 50 mcg Q15 Min PRN    Followed by    [START ON 4/24/2019] fentaNYL injection 50 mcg Q1H PRN    insulin regular (Humulin R) 100 Units in sodium chloride 0.9% 100 mL infusion Continuous    nitroGLYCERIN SL tablet 0.3 mg Q5 Min PRN    norepinephrine 4 mg in dextrose 5% 250 mL infusion (premix) (titrating) Continuous    pantoprazole injection 40 mg Daily    piperacillin-tazobactam 4.5 g in sodium chloride 0.9% 100 mL IVPB (ready to mix system) Q8H    propofol (DIPRIVAN) 10 mg/mL infusion Continuous    sodium chloride 0.9% flush 10 mL PRN    vasopressin (PITRESSIN) 0.2 Units/mL in dextrose 5 % 100 mL infusion Continuous     Facility-Administered Medications Ordered in Other Encounters   Medication Frequency    lidocaine (PF) 10 mg/ml (1%) injection 10 mg Once       Objective:     Vital Signs (Most Recent):  Temp: 99.3 °F (37.4 °C) (04/22/19 1100)  Pulse: 78 (04/22/19 1130)  Resp: 17 (04/22/19 1130)  BP: 119/66 (04/22/19 1100)  SpO2: 100 % (04/22/19 1130)  O2 Device (Oxygen Therapy): ventilator (04/22/19 1117) Vital Signs (24h Range):  Temp:  [99.2 °F (37.3 °C)-99.3 °F (37.4 °C)] 99.3 °F (37.4 °C)  Pulse:  [72-97] 78  Resp:  [0-37] 17  SpO2:  [83 %-100 %] 100 %  BP: ()/(55-70) 119/66  Arterial Line BP: ()/() 111/48     Weight: 72.6 kg (160 lb) (04/22/19  1015)  Body mass index is 27.46 kg/m².  Body surface area is 1.81 meters squared.    I/O last 3 completed shifts:  In: 71142.4 [I.V.:34607.4; Blood:547; NG/GT:120; IV Piggyback:7500]  Out: 3920 [Urine:2835; Drains:835; Stool:200; Blood:50]    Physical Exam   Constitutional: She appears well-developed and well-nourished. No distress. She is sedated and intubated.   HENT:   Head: Atraumatic.   Neck: No JVD present.   Cardiovascular: Normal rate and regular rhythm.   Pulmonary/Chest: Effort normal. She is intubated. She has rales.   Abdominal: Soft. She exhibits no distension.   Musculoskeletal: She exhibits edema (non-pitting). She exhibits no tenderness.   Neurological: She is alert.   Skin: Skin is warm. She is not diaphoretic.       Significant Labs:  CBC:   Recent Labs   Lab 04/22/19  0423   WBC 9.62   RBC 2.95*   HGB 8.2*   HCT 25.2*   PLT 80*   MCV 85   MCH 27.8   MCHC 32.5     CMP:   Recent Labs   Lab 04/22/19  0423   *   CALCIUM 7.9*   ALBUMIN 1.9*   PROT 5.1*      K 4.2   CO2 16*      BUN 44*   CREATININE 1.9*   ALKPHOS 103   *   AST 1,553*   BILITOT 5.2*

## 2019-04-22 NOTE — PLAN OF CARE
Sedation vacation performed. Patient opens eyes to voice, moves all extremities and follows commands.

## 2019-04-22 NOTE — HPI
60yo AAF with history of HTN, type 2 diabetes mellitus, CAD, NSTEMI, and back pain who presents to [facility] with altered mental status and sepsis. She underwent L5-S1 OLIF with NSGY on 4/12 and had intraoperative left ureteral injury with ureteroureteral anastomosis and ureteral stent placement. She did well initially and had correa and MADHURI drain removed on 4/16. She began having chills and altered mental status 2 days ago and this has progressively worsened. No complaints of pain.      Imaging showed air with minimal fluid near the surgical site with left ureter coursing through. There is air throughout the proximal collecting system which is decompressed with JJ ureteral stent in good position.     Taken for ex lap, ligation of left ureter and left neph tube placement on 4/21/19.  Currently patient is in the ICU, she has borderline hemodynamics and is on pressors.      Nephrology is consulted to assess need for RRT, mostly as it pertains to acidosis and elevated lactic acid level.

## 2019-04-22 NOTE — ASSESSMENT & PLAN NOTE
PAPA, at time of consult non-oliguric, baseline normal renal function  PAPA likely 2/2 ischemic ATN from hemodynamic instability/septic shock.    Repeat renal US w/o signs of hydronephrosis.  Nephrostomy tube draining blood urine.  Fontenot ~ 50 cc/hr.    Plan/Recommendations:  -would recommend hold further IVF as patient is positive ~ 10L/24h.  CVP elevated.  Vent settings stable.  -continue strict I/O's  -would hold diuretics as well.  -continue trending labs.  Currently no need for RRT at present.

## 2019-04-22 NOTE — PROGRESS NOTES
[facility]  Neurosurgery  Progress Note    Subjective:     History of Present Illness: Ms Huff is a 59F with history of HTN, DM, CAD, NSTEMI, and recent L5-S1 OLIF on  with intraoperative L ureteral injury repaired by urology with stent that presents to ED with AMS and fevers, concerning for sepsis.  states that he noticed this morning that she was beginning to show signs of altered mental status and developed fevers. She has been ambulating around the house with a walker, no recent changes in numbness/weakness. Back and lateral incisions are clean/dry/intact.          Post-Op Info:  Procedure(s) (LRB):  Creation, Nephrostomy, Percutaneous (Left)   1 Day Post-Op     Subjective: no AE. On 2 pressors sedated on propofol gtt.        Past Medical History:   Diagnosis Date    Anticoagulant long-term use     Asthma     Back pain     CAD (coronary artery disease)     s/p stentimg  (2), (1)    Carotid artery stenosis     Diabetes mellitus type 2 in obese     HTN (hypertension), benign     Hyperlipidemia     Keloid cicatrix     NPDR (nonproliferative diabetic retinopathy) 2015    NSTEMI (non-ST elevated myocardial infarction)     Nuclear sclerosis - Right Eye 3/18/2014    Primary localized osteoarthrosis, lower leg 2014    Sleep apnea      Past Surgical History:   Procedure Laterality Date    CARDIAC CATHETERIZATION      cataract extraction left eye      cataracts      CATHETERIZATION, HEART, LEFT Left 2014    Performed by Wilman Kim MD at [facility] CATH LAB     SECTION, LOW TRANSVERSE      CORONARY ANGIOPLASTY      Creation, Nephrostomy, Percutaneous Left 2019    Performed by Jason Surgeon at [facility] JASON    ESOPHAGOGASTRODUODENOSCOPY (EGD) N/A 2016    Performed by Gardenia Adamson MD at [facility] ENDO (4TH FLR)    EXCISION TURBINATE, SUBMUCOUS      FUSION, SPINE, LUMBAR, ANTERIOR APPROACH Left L5-S1 Anterior to Psoa Interbody  Fusion, L5-S1 Posterior Instrumentation Left 4/12/2019    Performed by Mk George MD at [facility] OR 2ND FLR    HAND SURGERY Left     HAND SURGERY Right     torn ligament repair/ Dr. Yeboah    HYSTERECTOMY      Injection,steroid,epidural,transforaminal approach - Bilateral - S1 Bilateral 9/25/2018    Performed by Tl Abreu MD at [facility] PAIN MGT    Injection-steroid-epidural-caudal N/A 2/7/2019    Performed by Dave Bentley Jr., MD at [facility] PAIN MGT    INSERTION-INTRAOCULAR LENS (IOL) Right 9/1/2015    Performed by Good Domingo MD at [facility] OR [facility] FLR    left foot surgery      left wrist surgery      NASAL SEPTUM SURGERY  5/7/15    PHACOEMULSIFICATION-ASPIRATION-CATARACT Right 9/1/2015    Performed by Good Domingo MD at [facility] OR [facility] FLR    REPAIR, URETER  4/12/2019    Performed by Mk George MD at [facility] OR [address] [place] [address]    RESECTION-TURBINATES (SMR) N/A 5/7/2015    [facility]    rt elbow surgery      S/P LAD COATED STENT  05/14/2010    6 total     S/P OM1 STENT  08/2003    SEPTOPLASTY N/A 5/7/2015    Per[facility]    SINUS SURGERY      F.E.S.S.    SINUS SURGERY FUNCTIONAL ENDOSCOPIC WITH NAVIGATION WITH MAXILLARIES, ETHMOIDS, LEFT SPHENOID, LEFT LOLY N/A 5/7/2015    Performed by Dileep Dubois III, MD at [facility] OR [address] [place] [address]    STENT, URETERAL placement  4/12/2019    [facility]    TUBAL LIGATION       Family History     Problem Relation (Age of Onset)    Diabetes Mother, Father, Sister, Brother, Sister    Heart attack Father    Heart disease Mother    Leukemia Father    No Known Problems Sister, Brother, Brother, Maternal Grandmother, Maternal Grandfather, Paternal Grandmother, Paternal Grandfather, Son, Son, Maternal Aunt, Maternal Uncle, Paternal Aunt, Paternal Uncle        Tobacco Use    Smoking status: Never Smoker    Smokeless tobacco:  Never Used   Substance and Sexual Activity    Alcohol use: No     Alcohol/week: 0.0 oz    Drug use: No    Sexual activity: Yes     Partners: Male     Birth control/protection: Post-menopausal     Comment:        Objective:     Weight: 72.6 kg (160 lb)  Body mass index is 27.46 kg/m².  Vital Signs (Most Recent):  Temp: 99.3 °F (37.4 °C) (04/22/19 1100)  Pulse: 77 (04/22/19 1215)  Resp: 20 (04/22/19 1215)  BP: (!) 92/55 (04/22/19 1200)  SpO2: 100 % (04/22/19 1215) Vital Signs (24h Range):  Temp:  [99.2 °F (37.3 °C)-99.3 °F (37.4 °C)] 99.3 °F (37.4 °C)  Pulse:  [72-97] 77  Resp:  [0-37] 20  SpO2:  [83 %-100 %] 100 %  BP: ()/(55-70) 92/55  Arterial Line BP: ()/() 103/48     Date 04/22/19 0700 - 04/23/19 0659   Shift 8783-7315 5302-2152 1843-7495 24 Hour Total   INTAKE   Shift Total(mL/kg)       OUTPUT   Urine(mL/kg/hr) 390   390   Drains 51   51   Stool 50   50   Shift Total(mL/kg) 491(6.8)   491(6.8)   Weight (kg) 72.6 72.6 72.6 72.6              Vent Mode: A/C  Oxygen Concentration (%):  [49-52] 49  Resp Rate Total:  [20 br/min-45 br/min] 20 br/min  Vt Set:  [450 mL] 450 mL  PEEP/CPAP:  [5 cmH20] 5 cmH20  Pressure Support:  [10 cmH20] 10 cmH20  Mean Airway Pressure:  [12 arN18-47 cmH20] 13 cmH20         Closed/Suction Drain 04/12/19 2115 Left Other (Comment) Bulb 10 Fr. (Active)            Urethral Catheter 04/12/19 1650 Non-latex;Straight-tip (Active)            Urethral Catheter 04/20/19 1711 Latex 16 Fr. (Active)   Output (mL) 900 mL 4/20/2019  5:00 PM       Physical Exam:  Nursing note and vitals reviewed.    Constitutional: She appears well-developed and well-nourished.     Eyes: Pupils are equal, round, and reactive to light.     Abdominal: Soft.     Neurological:   Intubated, sedated- propofol.     PERRL. +corneal/cough/gag.   Minimal withdrawal to stimulation.   On 2 pressors.      Significant Labs:  Recent Labs   Lab 04/21/19  1210 04/21/19  1918 04/22/19  0423   * 160* 141*     142 141   K 4.8 4.6 4.2    115* 109   CO2 13* 11* 16*   BUN 52* 44* 44*   CREATININE 2.6* 1.9* 1.9*   CALCIUM 8.5* 7.1* 7.9*   MG 2.1 1.7 1.9     Recent Labs   Lab 04/22/19  0100 04/22/19  0423 04/22/19  1207   WBC 10.22 9.62 9.73   HGB 8.9* 8.2* 8.2*   HCT 26.5* 25.2* 24.1*   PLT 87* 80* 86*     Recent Labs   Lab 04/22/19  0814   INR 2.1*     Microbiology Results (last 7 days)     Procedure Component Value Units Date/Time    Urine culture [[id number]] Collected:  04/21/19 0640    Order Status:  Completed Specimen:  Kidney, left Updated:  04/22/19 1205     Urine Culture, Routine No growth    Narrative:       Urine sample from left nephostomy  WAS TOLD BY [place] TECH TO RE-ORDER AS URINE CULTURE 04/21/2019    06:52      Clostridium difficile EIA [[id number]] Collected:  04/22/19 0812    Order Status:  Completed Specimen:  Stool Updated:  04/22/19 1138     C. diff Antigen Negative     C difficile Toxins A+B, EIA Negative     Comment: Testing not recommended for children <24 months old.       Blood culture [[id number]] Collected:  04/21/19 1835    Order Status:  Completed Specimen:  Blood from Peripheral, Hand, Left Updated:  04/22/19 1014     Blood Culture, Routine Gram stain aer bottle: Gram negative rods     Blood Culture, Routine Results called to and read back by: Carmenza Wallace RN 04/22/2019  10:14    Blood culture x two cultures. Draw prior to antibiotics. [[id number]] Collected:  04/20/19 1711    Order Status:  Completed Specimen:  Blood from Peripheral, Antecubital, Right Updated:  04/22/19 0752     Blood Culture, Routine Gram stain aer bottle: Gram negative rods     Blood Culture, Routine Gram stain flaco bottle: Gram negative rods      Blood Culture, Routine Results called to and read back by: Pancho Mars RN  04/21/2019  03:18     Blood Culture, Routine --     GRAM NEGATIVE CHELITA  Identification pending  For susceptibility see order #[id number]      Narrative:       Aerobic and anaerobic     Blood culture x two cultures. Draw prior to antibiotics. [[id number]] Collected:  04/20/19 1705    Order Status:  Completed Specimen:  Blood from Peripheral, Antecubital, Right Updated:  04/22/19 0751     Blood Culture, Routine Gram stain aer bottle: Gram negative rods     Blood Culture, Routine Gram stain flaco bottle: Gram negative rods      Blood Culture, Routine Positive results previously called 04/21/2019  06:36     Blood Culture, Routine --     GRAM NEGATIVE CHELITA  Identification and susceptibility pending      Narrative:       Aerobic and anaerobic    Aerobic culture [[id number]] Collected:  04/21/19 0125    Order Status:  Completed Specimen:  Body Fluid from Abdomen Updated:  04/22/19 0740     Aerobic Bacterial Culture Further report to follow    Narrative:       Retroperitoneal fluid    Culture, Anaerobe [[id number]] Collected:  04/21/19 0125    Order Status:  Completed Specimen:  Body Fluid from Abdomen Updated:  04/22/19 0726     Anaerobic Culture Culture in progress    Narrative:       Retroperitoneal fluid    Urine culture [[id number]] Collected:  04/21/19 2256    Order Status:  No result Specimen:  Urine Updated:  04/21/19 2327    Urine culture [[id number]] Collected:  04/20/19 1711    Order Status:  Completed Specimen:  Urine Updated:  04/21/19 1952     Urine Culture, Routine --     PRESUMPTIVE E COLI  >100,000 cfu/ml  Identification and susceptibility pending      Narrative:       Preferred Collection Type->Urine, Clean Catch    Blood culture [[id number]]     Order Status:  No result Specimen:  Blood     Gram stain [[id number]] Collected:  04/20/19 1711    Order Status:  Completed Specimen:  Urine from Catheterized Updated:  04/21/19 1328     Gram Stain Result Rare WBC's      Few Gram negative rods    Narrative:       add on GRAM #[id number] per Dr. Higuera @  04/21/2019  11:07     Gram stain [[id number]]     Order Status:  Completed Specimen:  Urine, Catheterized     Culture, Body Fluid - Bactec [[id number]]  Collected:  04/21/19 0641    Order Status:  Canceled Specimen:  Body Fluid from Bile Updated:  04/21/19 0643    Culture, Body Fluid - Bactec [[id number]] Collected:  04/21/19 0641    Order Status:  Canceled Specimen:  Body Fluid from Bile Updated:  04/21/19 0643    Culture, Anaerobe [[id number]] Collected:  04/21/19 0640    Order Status:  Canceled Specimen:  Body Fluid from Back Updated:  04/21/19 0642    Aerobic culture [[id number]] Collected:  04/21/19 0640    Order Status:  Canceled Specimen:  Kidney, left from Back Updated:  04/21/19 0642    Influenza A & B by Molecular [[id number]] Collected:  04/20/19 1713    Order Status:  Completed Specimen:  Nasopharyngeal Swab Updated:  04/20/19 1804     Influenza A, Molecular Negative     Influenza B, Molecular Negative     Flu A & B Source Nasal swab        CRP:   Recent Labs   Lab 04/20/19 1710   .8*     ESR: No results for input(s): POCESR, ERYTHROCYTES in the last 48 hours.    Significant Diagnostics:  I have reviewed all pertinent imaging results/findings within the past 24 hours.    Assessment/Plan:     Sepsis  59F with recent L5-S1 OLIF with intraoperative ureteral injury, repaired by urology intraoperatively, with stent placement that presents with AMS, fevers, concern for sepsis. Urinalysis with innumerable WBCs and bacteria     Neuro stable.  Wean sedation for neuro checks.   CV- goal normotension. Wean pressors if able.   Pulm- intubated, WTE when safe.   FENGI- goal eunatremia.   Heme/ID- recommendation for sepsis treatment. on vanc/zosyn in ED  Renal- f/u urology recommendations    Dispo- will follow closely.         Francisco Malagon MD  Neurosurgery  [facility]

## 2019-04-22 NOTE — ASSESSMENT & PLAN NOTE
Mariann TRISTIN Huff is a 59 y.o. female s/p left ureteral injury on 4/12, presented with fever, AMS, and intraabdominal abscess, taken for washout and ligation of left ureter with neph tube placement on 4/21    - Management per SICU, appreciate assistance   - Continue broad spectrum abx, follow up intraop cultures. Urine cultures growing presumptive E coli  - continue weaning pressors and fluid resuscitation  - Maintain correa and neph tube at this time  - Vent per SICU

## 2019-04-22 NOTE — CONSULTS
[facility]  Endocrinology  Diabetes Consult Note    Consult Requested by: Paul Carlson MD   Reason for admit: Ureteral transection of left native kidney    HISTORY OF PRESENT ILLNESS:  Reason for Consult: Management of T2DM, Hyperglycemia     Surgical Procedure and Date:       04/21/2019:         1. Exploratory laparotomy        2. Ligation of left ureter        3. Removal of left JJ ureteral stent      Diabetes diagnosis year: ANDRE    Home Diabetes Medications:  ANDRE  Toujeo 50 BID  Invokana 300mg daily   Novolog 35 units AM, 45 units PM, 35 units PM    How often checking glucose at home? ANDRE   BG readings on regimen: ANDRE  Hypoglycemia on the regimen?  ANDRE  Missed doses on regimen?  ANDRE    Diabetes Complications include:     Hyperglycemia and Diabetic retinopathy     Complicating diabetes co morbidities:   History of MI and MURTAZA, CAD, HLD    HPI:   Patient is a 59 y.o. female with a diagnosis of HTN, HLN, DM type 2, nonproliferative diabetic renopathy, CAD, NSTEMI, and back pain who presents to the ED with a complaint of altered mental status on 04/20/2019. Is now s/p Exploratory laparotomy, Ligation of left ureter, and Removal of left JJ ureteral stent. Endocrinology consulted to manage DM2/Hyperglycemia.    Lab Results   Component Value Date    HGBA1C 10.8 (H) 02/19/2019       Interval HPI:   Overnight events:   BG is within goal on intensive IV insulin infusion. Patient intubated at time of consult.     Eating:   NPO  Nausea: No  Hypoglycemia and intervention: No  Fever: No  TPN and/or TF: No  If yes, type of TF/TPN and rate: None    PMH, PSH, FH, SH  reviewed     ROS:  Unable to obtain due to: Sedation,Intubation,Altered mental status,Critical illness,Reviewed ROS from note dated 04/20/2019 per Carine Shea MD      Current Medications and/or Treatments Impacting Glycemic Control  Immunotherapy:    Immunosuppressants     None        Steroids:   Hormones (From admission, onward)     Start     Stop Route Frequency Ordered    04/21/19 0845  vasopressin (PITRESSIN) 0.2 Units/mL in dextrose 5 % 100 mL infusion      -- IV Continuous 04/21/19 0736        Pressors:    Autonomic Drugs (From admission, onward)    Start     Stop Route Frequency Ordered    04/21/19 0745  norepinephrine 4 mg in dextrose 5% 250 mL infusion (premix) (titrating)     Question Answer Comment   Titrate by: (in mcg/kg/min) 0.02    Titrate interval: (in minutes) 5    Titrate to maintain: (MAP or SBP) MAP    Greater than: (in mmHg) 65    Maximum dose: (in mcg/kg/min) 3        -- IV Continuous 04/21/19 0736        Hyperglycemia/Diabetes Medications:   Antihyperglycemics (From admission, onward)    Start     Stop Route Frequency Ordered    04/21/19 0400  insulin regular (Humulin R) 100 Units in sodium chloride 0.9% 100 mL infusion     Question:  Insulin Rate Adjustment (DO NOT MODIFY ANSWER)  Answer:  \\[email or web address] [id number]    -- IV Continuous 04/21/19 0254             PHYSICAL EXAMINATION:  Vitals:    04/22/19 0915   BP:    Pulse: 79   Resp: 18   Temp:      Body mass index is 27.46 kg/m².    Physical Exam   Constitutional:  Well developed, well nourished, NAD.  ENT: External ears no masses with nose patent  Neck:  Supple; trachea midline  Cardiovascular: Normal heart sounds, no LE edema.     Lungs:  Normal effort; lungs anterior bilaterally clear to auscultation.  Intubated on a ventilator.  Abdomen:  Soft, obese, no masses, no hernias. Hypoactive BS noted.  MS: No clubbing or cyanosis of nails noted; unable to assess gait.  Skin: No rashes, lesions, or ulcers; no nodules.  Injection sites are ok. No lipo hypertropthy or atrophy. MADHURI drains present.  Psychiatric: ANDRE  Neurological: ANDRE  Foot: Nails in good condition, no amputations noted.        Labs Reviewed and Include   Recent Labs   Lab 04/22/19  0424   *   CALCIUM 7.9*   ALBUMIN 1.9*   PROT 5.1*      K 4.2    CO2 16*      BUN 44*   CREATININE 1.9*   ALKPHOS 103   *   AST 1,553*   BILITOT 5.2*     Lab Results   Component Value Date    WBC 9.73 04/22/2019    HGB 8.2 (L) 04/22/2019    HCT 24.1 (L) 04/22/2019    MCV 85 04/22/2019    PLT 86 (L) 04/22/2019     No results for input(s): TSH, FREET4 in the last 168 hours.  Lab Results   Component Value Date    HGBA1C 10.8 (H) 02/19/2019       Nutritional status:   Body mass index is 27.46 kg/m².  Lab Results   Component Value Date    ALBUMIN 1.9 (L) 04/22/2019    ALBUMIN 1.4 (L) 04/21/2019    ALBUMIN 1.7 (L) 04/21/2019     No results found for: PREALBUMIN    Estimated Creatinine Clearance: 31.5 mL/min (A) (based on SCr of 1.9 mg/dL (H)).    Accu-Checks  Recent Labs     04/22/19  0604 04/22/19  0726 04/22/19  0821 04/22/19  0912 04/22/19  1012 04/22/19  1111 04/22/19  1211 04/22/19  1407 04/22/19  1520 04/22/19  1614   POCTGLUCOSE 129* 148* 150* 158* 147* 143* 142* 119* 104 99        ASSESSMENT and PLAN    * Ureteral transection of left native kidney  Managed per primary team  Avoid hypoglycemia        Type 2 diabetes mellitus with diabetic peripheral angiopathy without gangrene  BG goal 140 - 180     Continue IV insulin infusion protocol  Requires intensive BG monitoring while on protocol (every 2 hours.    Discharge planning:   TBD        CAD (coronary artery disease)    Managed per primary team  Condition may cause insulin resistance       PAPA (acute kidney injury)  Caution with insulin stacking        HLD (hyperlipidemia)  Managed per primary team  Condition may cause insulin resistance         Sleep apnea  May affect BG readings if uncontrolled            Plan discussed with patient, family, and RN at bedside.     Uriah Holder, SANTOSH  Endocrinology  [facility]

## 2019-04-22 NOTE — SUBJECTIVE & OBJECTIVE
Interval HPI:   Overnight events:   BG is within goal on intensive IV insulin infusion. Patient intubated at time of consult.     Eating:   NPO  Nausea: No  Hypoglycemia and intervention: No  Fever: No  TPN and/or TF: No  If yes, type of TF/TPN and rate: None    PMH, PSH, FH, SH  reviewed     ROS:  Unable to obtain due to: Sedation,Intubation,Altered mental status,Critical illness,Reviewed ROS from note dated 04/20/2019 per Carine Shea MD      Current Medications and/or Treatments Impacting Glycemic Control  Immunotherapy:    Immunosuppressants     None        Steroids:   Hormones (From admission, onward)    Start     Stop Route Frequency Ordered    04/21/19 0845  vasopressin (PITRESSIN) 0.2 Units/mL in dextrose 5 % 100 mL infusion      -- IV Continuous 04/21/19 0736        Pressors:    Autonomic Drugs (From admission, onward)    Start     Stop Route Frequency Ordered    04/21/19 0745  norepinephrine 4 mg in dextrose 5% 250 mL infusion (premix) (titrating)     Question Answer Comment   Titrate by: (in mcg/kg/min) 0.02    Titrate interval: (in minutes) 5    Titrate to maintain: (MAP or SBP) MAP    Greater than: (in mmHg) 65    Maximum dose: (in mcg/kg/min) 3        -- IV Continuous 04/21/19 0736        Hyperglycemia/Diabetes Medications:   Antihyperglycemics (From admission, onward)    Start     Stop Route Frequency Ordered    04/21/19 0400  insulin regular (Humulin R) 100 Units in sodium chloride 0.9% 100 mL infusion     Question:  Insulin Rate Adjustment (DO NOT MODIFY ANSWER)  Answer:  [email or web address] [id number]    -- IV Continuous 04/21/19 0254             PHYSICAL EXAMINATION:  Vitals:    04/22/19 0915   BP:    Pulse: 79   Resp: 18   Temp:      Body mass index is 27.46 kg/m².    Physical Exam   Constitutional:  Well developed, well nourished, NAD.  ENT: External ears no masses with nose patent  Neck:  Supple; trachea midline  Cardiovascular: Normal  heart sounds, no LE edema.     Lungs:  Normal effort; lungs anterior bilaterally clear to auscultation.  Intubated on a ventilator.  Abdomen:  Soft, obese, no masses, no hernias. Hypoactive BS noted.  MS: No clubbing or cyanosis of nails noted; unable to assess gait.  Skin: No rashes, lesions, or ulcers; no nodules.  Injection sites are ok. No lipo hypertropthy or atrophy. MADHURI drains present.  Psychiatric: ANDRE  Neurological: ANDRE  Foot: Nails in good condition, no amputations noted.

## 2019-04-22 NOTE — PROGRESS NOTES
[facility]  Nephrology  Progress Note    Patient Name: Mariann Huff  MRN: [id number]  Admission Date: 4/20/2019  Hospital Length of Stay: 2 days  Attending Provider: Paul Carlson MD   Primary Care Physician: Jasbir Haney MD  Principal Problem:Ureteral transection of left native kidney    Subjective:     Interval History:   Kidney function remains stable from yesterday w/o severe electrolyte abnormalities. She is net positive ~ 10L/24h (2.2L of UOP).  Bicarb remains low at 16, pH of 7.35.  Lactate trending down to 7.5 from 9.2.  CVP recorded ~ 21.    Weaned from Levo this morning, remains on vaso for BP support.  Minimal FiO2 requirements.    Review of patient's allergies indicates:  No Known Allergies  Current Facility-Administered Medications   Medication Frequency    0.9%  NaCl infusion Continuous    albuterol-ipratropium 2.5 mg-0.5 mg/3 mL nebulizer solution 3 mL Q4H PRN    chlorhexidine 0.12 % solution 15 mL BID    fentaNYL injection 50 mcg Q15 Min PRN    Followed by    [START ON 4/24/2019] fentaNYL injection 50 mcg Q1H PRN    insulin regular (Humulin R) 100 Units in sodium chloride 0.9% 100 mL infusion Continuous    nitroGLYCERIN SL tablet 0.3 mg Q5 Min PRN    norepinephrine 4 mg in dextrose 5% 250 mL infusion (premix) (titrating) Continuous    pantoprazole injection 40 mg Daily    piperacillin-tazobactam 4.5 g in sodium chloride 0.9% 100 mL IVPB (ready to mix system) Q8H    propofol (DIPRIVAN) 10 mg/mL infusion Continuous    sodium chloride 0.9% flush 10 mL PRN    vasopressin (PITRESSIN) 0.2 Units/mL in dextrose 5 % 100 mL infusion Continuous     Facility-Administered Medications Ordered in Other Encounters   Medication Frequency    lidocaine (PF) 10 mg/ml (1%) injection 10 mg Once       Objective:     Vital Signs (Most Recent):  Temp: 99.3 °F (37.4 °C) (04/22/19 1100)  Pulse: 78 (04/22/19 1130)  Resp: 17 (04/22/19 1130)  BP: 119/66 (04/22/19 1100)  SpO2: 100 % (04/22/19  1130)  O2 Device (Oxygen Therapy): ventilator (04/22/19 1117) Vital Signs (24h Range):  Temp:  [99.2 °F (37.3 °C)-99.3 °F (37.4 °C)] 99.3 °F (37.4 °C)  Pulse:  [72-97] 78  Resp:  [0-37] 17  SpO2:  [83 %-100 %] 100 %  BP: ()/(55-70) 119/66  Arterial Line BP: ()/() 111/48     Weight: 72.6 kg (160 lb) (04/22/19 1015)  Body mass index is 27.46 kg/m².  Body surface area is 1.81 meters squared.    I/O last 3 completed shifts:  In: 64530.4 [I.V.:18275.4; Blood:547; NG/GT:120; IV Piggyback:7500]  Out: 3920 [Urine:2835; Drains:835; Stool:200; Blood:50]    Physical Exam   Constitutional: She appears well-developed and well-nourished. No distress. She is sedated and intubated.   HENT:   Head: Atraumatic.   Neck: No JVD present.   Cardiovascular: Normal rate and regular rhythm.   Pulmonary/Chest: Effort normal. She is intubated. She has rales.   Abdominal: Soft. She exhibits no distension.   Musculoskeletal: She exhibits edema (non-pitting). She exhibits no tenderness.   Neurological: She is alert.   Skin: Skin is warm. She is not diaphoretic.       Significant Labs:  CBC:   Recent Labs   Lab 04/22/19  0423   WBC 9.62   RBC 2.95*   HGB 8.2*   HCT 25.2*   PLT 80*   MCV 85   MCH 27.8   MCHC 32.5     CMP:   Recent Labs   Lab 04/22/19  0423   *   CALCIUM 7.9*   ALBUMIN 1.9*   PROT 5.1*      K 4.2   CO2 16*      BUN 44*   CREATININE 1.9*   ALKPHOS 103   *   AST 1,553*   BILITOT 5.2*            Assessment/Plan:     Acidosis  ABG  7.35/27/15  -Mild acidemia.  Primary metabolic acidosis with respiratory compensation.  -lactate trending down.  On Propofol for sedation.    Recommendations:  -no acute need for RRT at this time  -Consider changing sedation medication from propofol, as this can also cause a lactic acidosis  -would aim for pH > 7.2.      PAPA (acute kidney injury)  PAPA, at time of consult non-oliguric, baseline normal renal function  PAPA likely 2/2 ischemic ATN from hemodynamic  instability/septic shock.    Repeat renal US w/o signs of hydronephrosis.  Nephrostomy tube draining blood urine.  Fontenot ~ 50 cc/hr.    Plan/Recommendations:  -would recommend holding further IVF as patient is positive ~ 10L/24h.  CVP elevated.  Vent settings stable.  -continue strict I/O's  -would hold diuretics as well.  -continue trending labs.  Currently no need for RRT at present.        Chriss Ziegler, SANTOSH  Nephrology  [facility]

## 2019-04-22 NOTE — CONSULTS
[facility]  Infectious Disease  Consult Note    Patient Name: Mariann Huff  MRN: [id number]  Admission Date: 4/20/2019  Hospital Length of Stay: 2 days  Attending Physician: Paul Carlson MD  Primary Care Provider: Jasbir Haney MD     Isolation Status: Special Contact      Inpatient consult to Infectious Diseases  Consult performed by: August Mars PA-C  Consult ordered by: Baylee Ferreira DO  Reason for consult: Assistance with antibiotic therapy            ID consult received. Chart being reviewed. Full note with recommendations to follow.    Thank you,  August Mars PA-C  [phone]

## 2019-04-22 NOTE — ASSESSMENT & PLAN NOTE
59F with recent L5-S1 OLIF with intraoperative ureteral injury, repaired by urology intraoperatively, with stent placement that presents with AMS, fevers, concern for sepsis. Urinalysis with innumerable WBCs and bacteria     Neuro stable.  Wean sedation for neuro checks.   CV- goal normotension. Wean pressors if able.   Pulm- intubated, WTE when safe.   FENGI- goal eunatremia.   Heme/ID- recommendation for sepsis treatment. on vanc/zosyn in ED  Renal- f/u urology recommendations    Dispo- will follow closely.

## 2019-04-22 NOTE — ASSESSMENT & PLAN NOTE
BG goal 140 - 180     Continue IV insulin infusion protocol  Requires intensive BG monitoring while on protocol (every 2 hours.    Discharge planning:   TBD

## 2019-04-22 NOTE — ASSESSMENT & PLAN NOTE
ASSESSMENT/PLAN:   Mariann Huff is a 59 y.o. female s/p left ureteral injury on 4/12, who presented with fever, AMS, and intraabdominal abscess, taken for washout and ligation of left ureter with nephrostomy tube placement on 4/21.     Neuro:   - Sedated with propofol  - Pain control with fentanyl    Pulmonary:   - Intubated  - Daily CXR   - ABGs prn     Recent Labs     04/22/19  0426   PH 7.354   PCO2 27.1*   PO2 91   HCO3 15.1*   POCSATURATED 97   BE -10       Vent Mode: A/C  Oxygen Concentration (%):  [49-60] 49  Resp Rate Total:  [18 br/min-45 br/min] 20 br/min  Vt Set:  [450 mL] 450 mL  PEEP/CPAP:  [5 cmH20] 5 cmH20  Pressure Support:  [10 cmH20] 10 cmH20  Mean Airway Pressure:  [11 cyD50-54 cmH20] 15 cmH20    Cardiac:   - Vaso gtt and Levo gtt titrating to MAP> 65  - TTE ordered yesterday, awaiting read    Renal:    - BUN/Cr elevated at 66/3.6 pre-operatively  - S/p transection of left ureter  - S/p PCT nephrostomy tube placement with IR 4/21  - Nephrology was consulted for evaluation of lactic acidosis; no need for CRRT at this time as pt has good UOP    - Monitor I/Os    Intake/Output Summary (Last 24 hours) at 4/22/2019 0709  Last data filed at 4/22/2019 0600  Gross per 24 hour   Intake 04203.35 ml   Output 3250 ml   Net 52642.35 ml     ID:   - Cont with Zosyn  - Blood cultures results pending  - UCx from 4/20 growing E. Coli; sensitivities pending. Repeat Cx pending.   - ID consulted for assistance with abx therapy    Hem/Onc:   - Hgb 6 post-op; received 2 units pRBC  - H/H stable at this time; cont to monitor    Endocrine:  - DM Type 2 at baseline    - Insulin gtt  - Endocrine consulted for assistance     Fluids/Electrolytes/Nutrition/GI:   # Shock Liver   - Elevated LFTs; may be 2/2 to shock liver from hypotension    - Thrombocytopenia; plts cont to downtrend, 80 plts this AM   - INR ordered  # Lactic Acidosis   - Continues to remain elevated; pH is improving on ABG   - Cont to trend [place]   -  Received multiple boluses overnight   - Albumin admininistered    - VBG ordered to assess Mixed Venous O2  # Diarrhea   - Pt with multiple episodes of loose stool   - C.Diff panel ordered, results pending   - Contact precautions    - Replace electrolytes PRN  - NPO  - Will trend lactates and bolus accordingly    PPx:  - DVT: None until after IR treatment and can ensure pt not actively bleeding. Plts continue to drop.       DISPO:  - Continue SICU care

## 2019-04-22 NOTE — PLAN OF CARE
04/22/19 1517   Discharge Assessment   Assessment Type Discharge Planning Assessment   Confirmed/corrected address and phone number on facesheet? Yes   Assessment information obtained from? Medical Record   Expected Length of Stay (days) 5   Prior to hospitilization cognitive status: Alert/Oriented   Prior to hospitalization functional status: Independent   Current cognitive status: Unable to Assess   Current Functional Status: Needs Assistance;Assistive Equipment   Facility Arrived From: home via EMS   Lives With spouse   Able to Return to Prior Arrangements yes   Is patient able to care for self after discharge? Unable to determine at this time (comments)   Who are your caregiver(s) and their phone number(s)? spouse Shamir Huff [phone]   Patient's perception of discharge disposition home or selfcare   Readmission Within the Last 30 Days other (see comments)  (Pt readmitted for sepsis)   Patient currently being followed by outpatient case management? No   Patient currently receives any other outside agency services? No   Equipment Currently Used at Home none   Do you have any problems affording any of your prescribed medications? No   Is the patient taking medications as prescribed? yes   Does the patient have transportation home? Yes   Transportation Anticipated family or friend will provide   Does the patient receive services at the [facility]? No   Discharge Plan A Home with family   Discharge Plan B Home with family;Home Health   DME Needed Upon Discharge  walker, rolling;shower chair   Patient/Family in Agreement with Plan unable to assess     Pt is readmitted from admit for L5/S1 spinal fusion anterior approach with ureteral injury. She was readmitted with AMS, malaise, fever and dizziness. Admitted to ICU, epi gtt for pressor support. The patient has an intraoperative abscess and MADHURI drain was placed. Nephrology is consulted for PAPA. PT/OT should be consulted to help with discharge planning. Pt  with PO box for address will need to confirm physical address and contact information with patient/caregiver prior to discharge. The patient was seen by PT/OT on previous admit for spinal surgery and they recommended OP PT, and DME rolling walker and shower chair. Shower chairs are not covered DME by insurance. Will continue to monitor and help with discharge planning throughout admission.       [id number] [place] - [address]  [address]  [address]  [place] [address]  Phone: [phone] Fax: [phone]      Jabsir Haney MD   [phone] phone  [phone] fax    Future Appointments   Date Time Provider Department Center   4/30/2019  9:15 AM [facility] ORTHO CLINIC LIMIT 350LBS [facility] ORXRAY [facility]   4/30/2019  9:40 AM Jamey Mars PA-C [facility] NEUROSU [facility]   5/31/2019  8:00 AM LAB, [place] [facility] [place]   6/4/2019  9:00 AM [facility] ORTHO CLINIC LIMIT 350LBS [facility]XRAY [facility]   6/4/2019  9:30 AM Mk George MD [facility] NEUROSU [facility]   6/4/2019  3:30 PM Adelaide Aguilar MD [facility] CARDIO [place]   6/18/2019 12:00 PM [facility]   6/18/2019  1:20 PM Robin Boyd MD [facility] UROLOGY [address]   6/20/2019 10:45 AM KEKE UROLOGY [facility] CYSTO[facility]

## 2019-04-22 NOTE — SUBJECTIVE & OBJECTIVE
Subjective: no AE. On 2 pressors sedated on propofol gtt.        Past Medical History:   Diagnosis Date    Anticoagulant long-term use     Asthma     Back pain     CAD (coronary artery disease)     s/p stentimg  (2), (1)    Carotid artery stenosis     Diabetes mellitus type 2 in obese     HTN (hypertension), benign     Hyperlipidemia     Keloid cicatrix     NPDR (nonproliferative diabetic retinopathy) 2015    NSTEMI (non-ST elevated myocardial infarction)     Nuclear sclerosis - Right Eye 3/18/2014    Primary localized osteoarthrosis, lower leg 2014    Sleep apnea      Past Surgical History:   Procedure Laterality Date    CARDIAC CATHETERIZATION      cataract extraction left eye      cataracts      CATHETERIZATION, HEART, LEFT Left 2014    Performed by [facility] at [facility] CATH LAB     SECTION, LOW TRANSVERSE      CORONARY ANGIOPLASTY      Creation, Nephrostomy, Percutaneous Left 2019    Performed by Karina Surgeon at [facility] KARINA    ESOPHAGOGASTRODUODENOSCOPY (EGD) N/A 2016    Performed by Gardenia Adamson MD at [facility] ENDO (4TH FLR)    EXCISION TURBINATE, SUBMUCOUS      FUSION, SPINE, LUMBAR, ANTERIOR APPROACH Left L5-S1 Anterior to Psoa Interbody Fusion, L5-S1 Posterior Instrumentation Left 2019    Performed by Mk George MD at [facility] OR 2ND FLR    HAND SURGERY Left     HAND SURGERY Right     torn ligament repair/ Dr. Yeboah    HYSTERECTOMY      Injection,steroid,epidural,transforaminal approach - Bilateral - S1 Bilateral 2018    Per[facility] Tl [facility]u [facility] at [facility] PAIN MGT    Injection-steroid-epidural-caudal N/A 2019    Performed by Dave Bentley Jr., MD at [facility] PAIN MGT    INSERTION-INTRAOCULAR LENS (IOL) Right 2015    Performed by Good Domingo MD at [facility] OR 1ST FLR    left foot surgery      left wrist surgery      NASAL SEPTUM SURGERY  5/7/15    PHACOEMULSIFICATION-ASPIRATION-CATARACT  Right 9/1/2015    Performed by Good [facility]ingo [facility] at [facility] OR 1ST FLR    REPAIR, URETER  4/12/2019    Performed by Mk George MD at [facility] OR 2ND FLR    RESECTION-TURBINATES (SMR) N/A 5/7/2015    Performed by Dileep Dubois III, MD at [facility] OR 2ND FLR    rt elbow surgery      S/P LAD COATED STENT  05/14/2010    6 total     S/P OM1 STENT  08/2003    SEPTOPLASTY N/A 5/7/2015    Performed by Dileep Dubois III, MD at [facility] OR 2ND FLR    SINUS SURGERY      F.E.S.S.    SINUS SURGERY FUNCTIONAL ENDOSCOPIC WITH NAVIGATION WITH MAXILLARIES, ETHMOIDS, LEFT SPHENOID, LEFT LOLY N/A 5/7/2015    Performed by Dileep Dubois III, MD at [facility] OR 2ND FLR    STENT, URETERAL placement  4/12/2019    [facility]    TUBAL LIGATION       Family History     Problem Relation (Age of Onset)    Diabetes Mother, Father, Sister, Brother, Sister    Heart attack Father    Heart disease Mother    Leukemia Father    No Known Problems Sister, Brother, Brother, Maternal Grandmother, Maternal Grandfather, Paternal Grandmother, Paternal Grandfather, Son, Son, Maternal Aunt, Maternal Uncle, Paternal Aunt, Paternal Uncle        Tobacco Use    Smoking status: Never Smoker    Smokeless tobacco: Never Used   Substance and Sexual Activity    Alcohol use: No     Alcohol/week: 0.0 oz    Drug use: No    Sexual activity: Yes     Partners: Male     Birth control/protection: Post-menopausal     Comment:        Objective:     Weight: 72.6 kg (160 lb)  Body mass index is 27.46 kg/m².  Vital Signs (Most Recent):  Temp: 99.3 °F (37.4 °C) (04/22/19 1100)  Pulse: 77 (04/22/19 1215)  Resp: 20 (04/22/19 1215)  BP: (!) 92/55 (04/22/19 1200)  SpO2: 100 % (04/22/19 1215) Vital Signs (24h Range):  Temp:  [99.2 °F (37.3 °C)-99.3 °F (37.4 °C)] 99.3 °F (37.4 °C)  Pulse:  [72-97] 77  Resp:  [0-37] 20  SpO2:  [83 %-100 %] 100 %  BP: ()/(55-70) 92/55  Arterial Line BP: ()/() 103/48     Date  04/22/19 0700 - 04/23/19 0659   Shift 1013-3424 9598-5523 4718-1029 24 Hour Total   INTAKE   Shift Total(mL/kg)       OUTPUT   Urine(mL/kg/hr) 390   390   Drains 51   51   Stool 50   50   Shift Total(mL/kg) 491(6.8)   491(6.8)   Weight (kg) 72.6 72.6 72.6 72.6              Vent Mode: A/C  Oxygen Concentration (%):  [49-52] 49  Resp Rate Total:  [20 br/min-45 br/min] 20 br/min  Vt Set:  [450 mL] 450 mL  PEEP/CPAP:  [5 cmH20] 5 cmH20  Pressure Support:  [10 cmH20] 10 cmH20  Mean Airway Pressure:  [12 anK78-05 cmH20] 13 cmH20         Closed/Suction Drain 04/12/19 2115 Left Other (Comment) Bulb 10 Fr. (Active)            Urethral Catheter 04/12/19 1650 Non-latex;Straight-tip (Active)            Urethral Catheter 04/20/19 1711 Latex 16 Fr. (Active)   Output (mL) 900 mL 4/20/2019  5:00 PM       Physical Exam:  Nursing note and vitals reviewed.    Constitutional: She appears well-developed and well-nourished.     Eyes: Pupils are equal, round, and reactive to light.     Abdominal: Soft.     Neurological:   Intubated, sedated- propofol.     PERRL. +corneal/cough/gag.   Minimal withdrawal to stimulation.   On 2 pressors.      Significant Labs:  Recent Labs   Lab 04/21/19  1210 04/21/19  1918 04/22/19  0423   * 160* 141*    142 141   K 4.8 4.6 4.2    115* 109   CO2 13* 11* 16*   BUN 52* 44* 44*   CREATININE 2.6* 1.9* 1.9*   CALCIUM 8.5* 7.1* 7.9*   MG 2.1 1.7 1.9     Recent Labs   Lab 04/22/19  0100 04/22/19  0423 04/22/19  1207   WBC 10.22 9.62 9.73   HGB 8.9* 8.2* 8.2*   HCT 26.5* 25.2* 24.1*   PLT 87* 80* 86*     Recent Labs   Lab 04/22/19  0814   INR 2.1*     Microbiology Results (last 7 days)     Procedure Component Value Units Date/Time    Urine culture [[id number]] Collected:  04/21/19 0640    Order Status:  Completed Specimen:  Kidney, left Updated:  04/22/19 1205     Urine Culture, Routine No growth    Narrative:       Urine sample from left nephostomy  WAS TOLD BY MICROBIOLOGY TECH TO RE-ORDER AS  URINE CULTURE 04/21/2019    06:52      Clostridium difficile EIA [[id number]] Collected:  04/22/19 0812    Order Status:  Completed Specimen:  Stool Updated:  04/22/19 1138     C. diff Antigen Negative     C difficile Toxins A+B, EIA Negative     Comment: Testing not recommended for children <24 months old.       Blood culture [[id number]] Collected:  04/21/19 1835    Order Status:  Completed Specimen:  Blood from Peripheral, Hand, Left Updated:  04/22/19 1014     Blood Culture, Routine Gram stain aer bottle: Gram negative rods     Blood Culture, Routine Results called to and read back by: Carmenza Wallace RN 04/22/2019  10:14    Blood culture x two cultures. Draw prior to antibiotics. [[id number]] Collected:  04/20/19 1711    Order Status:  Completed Specimen:  Blood from Peripheral, Antecubital, Right Updated:  04/22/19 0752     Blood Culture, Routine Gram stain aer bottle: Gram negative rods     Blood Culture, Routine Gram stain flaco bottle: Gram negative rods      Blood Culture, Routine Results called to and read back by: Pancho Mars RN  04/21/2019  03:18     Blood Culture, Routine --     GRAM NEGATIVE CHELITA  Identification pending  For susceptibility see order #[id number]      Narrative:       Aerobic and anaerobic    Blood culture x two cultures. Draw prior to antibiotics. [[id number]] Collected:  04/20/19 1705    Order Status:  Completed Specimen:  Blood from Peripheral, Antecubital, Right Updated:  04/22/19 0751     Blood Culture, Routine Gram stain aer bottle: Gram negative rods     Blood Culture, Routine Gram stain flaco bottle: Gram negative rods      Blood Culture, Routine Positive results previously called 04/21/2019  06:36     Blood Culture, Routine --     GRAM NEGATIVE CHELITA  Identification and susceptibility pending      Narrative:       Aerobic and anaerobic    Aerobic culture [[id number]] Collected:  04/21/19 0125    Order Status:  Completed Specimen:  Body Fluid from Abdomen Updated:  04/22/19 0740     Aerobic  Bacterial Culture Further report to follow    Narrative:       Retroperitoneal fluid    Culture, Anaerobe [[id number]] Collected:  04/21/19 0125    Order Status:  Completed Specimen:  Body Fluid from Abdomen Updated:  04/22/19 0726     Anaerobic Culture Culture in progress    Narrative:       Retroperitoneal fluid    Urine culture [[id number]] Collected:  04/21/19 2256    Order Status:  No result Specimen:  Urine Updated:  04/21/19 2327    Urine culture [[id number]] Collected:  04/20/19 1711    Order Status:  Completed Specimen:  Urine Updated:  04/21/19 1952     Urine Culture, Routine --     PRESUMPTIVE E COLI  >100,000 cfu/ml  Identification and susceptibility pending      Narrative:       Preferred Collection Type->Urine, Clean Catch    Blood culture [[id number]]     Order Status:  No result Specimen:  Blood     Gram stain [[id number]] Collected:  04/20/19 1711    Order Status:  Completed Specimen:  Urine from Catheterized Updated:  04/21/19 1328     Gram Stain Result Rare WBC's      Few Gram negative rods    Narrative:       add on GRAM #[id number] per Dr. Higuera @  04/21/2019  11:07     Gram stain [[id number]]     Order Status:  Completed Specimen:  Urine, Catheterized     Culture, Body Fluid - Bactec [[id number]] Collected:  04/21/19 0641    Order Status:  Canceled Specimen:  Body Fluid from Bile Updated:  04/21/19 0643    Culture, Body Fluid - Bactec [[id number]] Collected:  04/21/19 0641    Order Status:  Canceled Specimen:  Body Fluid from Bile Updated:  04/21/19 0643    Culture, Anaerobe [[id number]] Collected:  04/21/19 0640    Order Status:  Canceled Specimen:  Body Fluid from Back Updated:  04/21/19 0642    Aerobic culture [[id number]] Collected:  04/21/19 0640    Order Status:  Canceled Specimen:  Kidney, left from Back Updated:  04/21/19 0642    Influenza A & B by Molecular [[id number]] Collected:  04/20/19 1713    Order Status:  Completed Specimen:  Nasopharyngeal Swab Updated:  04/20/19 1804     Influenza A,  Molecular Negative     Influenza B, Molecular Negative     Flu A & B Source Nasal swab        CRP:   Recent Labs   Lab 04/20/19  1710   .8*     ESR: No results for input(s): POCESR, ERYTHROCYTES in the last 48 hours.    Significant Diagnostics:  I have reviewed all pertinent imaging results/findings within the past 24 hours.

## 2019-04-22 NOTE — SUBJECTIVE & OBJECTIVE
Past Medical History:   Diagnosis Date    Anticoagulant long-term use     Asthma     Back pain     CAD (coronary artery disease)     s/p stentimg  (2), (1)    Carotid artery stenosis     Diabetes mellitus type 2 in obese     HTN (hypertension), benign     Hyperlipidemia     Keloid cicatrix     NPDR (nonproliferative diabetic retinopathy) 2015    NSTEMI (non-ST elevated myocardial infarction)     Nuclear sclerosis - Right Eye 3/18/2014    Primary localized osteoarthrosis, lower leg 2014    Sleep apnea        Past Surgical History:   Procedure Laterality Date    CARDIAC CATHETERIZATION      cataract extraction left eye      cataracts      CATHETERIZATION, HEART, LEFT Left 2014    Performed by Wilman Kim MD at [facility] CATH LAB     SECTION, LOW TRANSVERSE      CORONARY ANGIOPLASTY      Creation, Nephrostomy, Percutaneous Left 2019    Performed by Karina Surgeon at [facility] KARINA    ESOPHAGOGASTRODUODENOSCOPY (EGD) N/A 2016    Performed by Gardenia Adamson MD at [facility] ENDO (4TH FLR)    EXCISION TURBINATE, SUBMUCOUS      FUSION, SPINE, LUMBAR, ANTERIOR APPROACH Left L5-S1 Anterior to Psoa Interbody Fusion, L5-S1 Posterior Instrumentation Left 2019    Performed by Mk George MD at [facility] OR 2ND FLR    HAND SURGERY Left     HAND SURGERY Right     torn ligament repair/ Dr. Yeboah    HYSTERECTOMY      Injection,steroid,epidural,transforaminal approach - Bilateral - S1 Bilateral 2018    Performed by Tl Abreu MD at [facility] PAIN MGT    Injection-steroid-epidural-caudal N/A 2019    Performed by Dave Bentley Jr., MD at [facility] PAIN MGT    INSERTION-INTRAOCULAR LENS (IOL) Right 2015    Performed by Good Domingo MD at [facility] OR 1ST FLR    left foot surgery      left wrist surgery      NASAL SEPTUM SURGERY  5/7/15    PHACOEMULSIFICATION-ASPIRATION-CATARACT Right 2015    Performed by Good Domingo MD at [facility]  "OR 1ST FLR    REPAIR, URETER  4/12/2019    Performed by Mk George MD at [facility] OR 2ND FLR    RESECTION-TURBINATES (SMR) N/A 5/7/2015    Performed by Dileep Dubois III, MD at [facility] OR 2ND FLR    rt elbow surgery      S/P LAD COATED STENT  05/14/2010    6 total     S/P OM1 STENT  08/2003    SEPTOPLASTY N/A 5/7/2015    Performed by Dileep Dubois III, MD at [facility] OR 2ND FLR    SINUS SURGERY      F.E.S.S.    SINUS SURGERY FUNCTIONAL ENDOSCOPIC WITH NAVIGATION WITH MAXILLARIES, ETHMOIDS, LEFT SPHENOID, LEFT LOLY N/A 5/7/2015    Performed by Dileep Dubois III, MD at [facility] OR 2ND FLR    STENT, URETERAL placement  4/12/2019    Performed by Robin Boyd MD at [facility] OR 2ND FLR    TUBAL LIGATION         Review of patient's allergies indicates:  No Known Allergies    Medications:  Medications Prior to Admission   Medication Sig    albuterol (PROVENTIL/VENTOLIN HFA) 90 mcg/actuation inhaler Inhale 2 puffs into the lungs every 6 (six) hours as needed for Wheezing.    amLODIPine (NORVASC) 10 MG tablet TAKE 1 TABLET (10 MG TOTAL) BY MOUTH ONCE DAILY.    aspirin 81 mg Tab Take 81 mg by mouth. 1 Tablet Oral Every day    atorvastatin (LIPITOR) 40 MG tablet TAKE 1 TABLET (40 MG TOTAL) BY MOUTH ONCE DAILY.    ACCU-CHEK EDIN PLUS METER Misc     BD INSULIN PEN NEEDLE UF MINI 31 x 3/16 " Ndle USE 4 TIMES A DAY    blood sugar diagnostic (ACCU-CHEK EDIN PLUS TEST STRP) Strp TEST BLOOD SUGARS 4 TIMES DAILY    chlorthalidone (HYGROTEN) 50 MG Tab Take 1 tablet (50 mg total) by mouth once daily.    cyclobenzaprine (FLEXERIL) 10 MG tablet TAKE 1 TABLET BY MOUTH 3 TIMES A DAY AS NEEDED FOR MUSCLE SPASMS. NO WORKING OR DRIVING ON THIS MED    esomeprazole (NEXIUM) 40 MG capsule TAKE 1 CAPSULE (40 MG TOTAL) BY MOUTH BEFORE BREAKFAST.    fenofibrate micronized (LOFIBRA) 134 MG Cap TAKE 1 CAPSULE (134 MG TOTAL) BY MOUTH BEFORE BREAKFAST.    flash glucose scanning reader ([employer] MISTI 14 DAY READER) Misc 1 each by " "Misc.(Non-Drug; Combo Route) route once daily.    flash glucose sensor (FREESTYLE MISTI 14 DAY SENSOR) Kit 1 each by Misc.(Non-Drug; Combo Route) route once daily.    gabapentin (NEURONTIN) 100 MG capsule Take 2 capsules (200 mg total) by mouth every evening.    insulin aspart U-100 (NOVOLOG FLEXPEN U-100 INSULIN) 100 unit/mL InPn pen INJECT 35 U AM, 45 U AT NOON, 35 U PM.150-200 +2, 201-250 +4, 251-300 +6, 301-350 +8, >350 +10    insulin glargine, TOUJEO, (TOUJEO SOLOSTAR U-300 INSULIN) 300 unit/mL (1.5 mL) InPn pen Inject 50 Units into the skin 2 (two) times daily.    INVOKANA 300 mg Tab tablet Take 1 tablet (300 mg total) by mouth once daily.    irbesartan (AVAPRO) 300 MG tablet Take 1 tablet (300 mg total) by mouth every evening.    lancets 30 gauge Misc     metoprolol succinate (TOPROL-XL) 100 MG 24 hr tablet TAKE 1 TABLET (100 MG TOTAL) BY MOUTH ONCE DAILY.    nitroGLYCERIN (NITROSTAT) 0.4 MG SL tablet Take one every 2-3 min till chestpain relief for 3 times and if still no relief, call MD or come to ED    omega-3 acid ethyl esters (LOVAZA) 1 gram capsule Take 1 g by mouth once daily.     pen needle, diabetic (BD ULTRA-FINE GRABIEL PEN NEEDLES) 32 gauge x 5/32" Ndle For use with insulin pens daily 4 times a day    prasugrel (EFFIENT) 10 mg Tab TAKE 1 TABLET (10 MG TOTAL) BY MOUTH ONCE DAILY.    tamsulosin (FLOMAX) 0.4 mg Cap Take 1 capsule (0.4 mg total) by mouth every evening.    tramadol (ULTRAM) 50 mg tablet Take 1 tablet (50 mg total) by mouth 3 (three) times daily as needed for Pain.    TRULICITY 1.5 mg/0.5 mL PnIj INJECT 1.5 MG INTO THE SKIN EVERY 7 DAYS.    zolpidem (AMBIEN) 5 MG Tab Take 1 tablet (5 mg total) by mouth nightly as needed.     Antibiotics (From admission, onward)    Start     Stop Route Frequency Ordered    04/22/19 0900  piperacillin-tazobactam 4.5 g in sodium chloride 0.9% 100 mL IVPB (ready to mix system)      -- IV Every 8 hours (non-standard times) 04/22/19 0804    "     Antifungals (From admission, onward)    None        Antivirals (From admission, onward)    None           Immunization History   Administered Date(s) Administered    Influenza 11/19/2009    Influenza - Quadrivalent 10/10/2014, 10/09/2015, 10/24/2016    Influenza - Quadrivalent - PF 11/27/2017    Influenza A (H1N1) 2009 Monovalent - IM 11/19/2009    Pneumococcal Polysaccharide - 23 Valent 06/19/2014    Td - PF (ADULT) 04/17/2017       Family History     Problem Relation (Age of Onset)    Diabetes Mother, Father, Sister, Brother, Sister    Heart attack Father    Heart disease Mother    Leukemia Father    No Known Problems Sister, Brother, Brother, Maternal Grandmother, Maternal Grandfather, Paternal Grandmother, Paternal Grandfather, Son, Son, Maternal Aunt, Maternal Uncle, Paternal Aunt, Paternal Uncle        Social History     Socioeconomic History    Marital status:      Spouse name: Shamir    Number of children: 2    Years of education: Not on file    Highest education level: Not on file   Occupational History    Occupation: cafeteria     Employer: [employer]     Employer: [employer]     Employer: [employer]   Social Needs    Financial resource strain: Not on file    Food insecurity:     Worry: Not on file     Inability: Not on file    Transportation needs:     Medical: Not on file     Non-medical: Not on file   Tobacco Use    Smoking status: Never Smoker    Smokeless tobacco: Never Used   Substance and Sexual Activity    Alcohol use: No     Alcohol/week: 0.0 oz    Drug use: No    Sexual activity: Yes     Partners: Male     Birth control/protection: Post-menopausal     Comment:    Lifestyle    Physical activity:     Days per week: Not on file     Minutes per session: Not on file    Stress: Not on file   Relationships    Social connections:     Talks on phone: Not on file     Gets together: Not on file     Attends Nondenominational service: Not  on file     Active member of club or organization: Not on file     Attends meetings of clubs or organizations: Not on file     Relationship status: Not on file   Other Topics Concern    Are you pregnant or think you may be? No    Breast-feeding No   Social History Narrative    . 2 children.      Review of Systems   Unable to perform ROS: Intubated     Objective:     Vital Signs (Most Recent):  Temp: 99.3 °F (37.4 °C) (04/22/19 1100)  Pulse: 80 (04/22/19 1415)  Resp: 20 (04/22/19 1415)  BP: 112/61 (04/22/19 1400)  SpO2: 100 % (04/22/19 1415) Vital Signs (24h Range):  Temp:  [99.2 °F (37.3 °C)-99.3 °F (37.4 °C)] 99.3 °F (37.4 °C)  Pulse:  [72-91] 80  Resp:  [0-37] 20  SpO2:  [83 %-100 %] 100 %  BP: ()/(55-70) 112/61  Arterial Line BP: ()/() 129/58     Weight: 72.6 kg (160 lb)  Body mass index is 27.46 kg/m².    Estimated Creatinine Clearance: 31.5 mL/min (A) (based on SCr of 1.9 mg/dL (H)).    Physical Exam   Constitutional: She appears well-developed and well-nourished. No distress.       intubated   HENT:   Head: Normocephalic.   Cardiovascular: Normal rate, regular rhythm and normal heart sounds.   No murmur heard.  Pulmonary/Chest: Effort normal. No stridor. No respiratory distress.   Abdominal: Soft. She exhibits no distension.   Rectal tube with watery stool output  Nephrostomy drain in place   Musculoskeletal: She exhibits no edema or deformity.   Neurological:   arousal with verbal command   Skin: Skin is warm and dry. She is not diaphoretic. No erythema. No pallor.   Psychiatric: She has a normal mood and affect.   Vitals reviewed.      Significant Labs:   Blood Culture:   Recent Labs   Lab 04/20/19  1705 04/20/19  1711 04/21/19  1835   LABBLOO Gram stain aer bottle: Gram negative rods  Gram stain flaco bottle: Gram negative rods   Positive results previously called 04/21/2019  06:36  ESCHERICHIA COLI  Susceptibility pending   Gram stain aer bottle: Gram negative rods  Gram stain  flaco bottle: Gram negative rods   Results called to and read back by: Pancho Mars RN  04/21/2019  03:18  ESCHERICHIA COLI  For susceptibility see order #[id number]   Gram stain aer bottle: Gram negative rods  Results called to and read back by: Carmenza Wallace RN 04/22/2019  10:14     C4 Count: No results for input(s): C4 in the last 48 hours.  CBC:   Recent Labs   Lab 04/22/19  0100 04/22/19  0423 04/22/19  1207   WBC 10.22 9.62 9.73   HGB 8.9* 8.2* 8.2*   HCT 26.5* 25.2* 24.1*   PLT 87* 80* 86*     Respiratory Culture: No results for input(s): GSRESP, RESPIRATORYC in the last 4320 hours.  Urine Culture:   Recent Labs   Lab 04/20/19  1711 04/21/19  0640   LABURIN PRESUMPTIVE E COLI  >100,000 cfu/ml  Identification and susceptibility pending   No growth     Urine Studies:   Recent Labs   Lab 04/21/19  2256   COLORU Argelia   APPEARANCEUA Cloudy*   PHUR 5.0   SPECGRAV 1.020   PROTEINUA 1+*   GLUCUA 3+*   KETONESU Negative   BILIRUBINUA Negative   OCCULTUA 3+*   NITRITE Negative   LEUKOCYTESUR 1+*   RBCUA 14*   WBCUA 51*   BACTERIA Moderate*   SQUAMEPITHEL 3   HYALINECASTS 3*     Wound Culture:   Recent Labs   Lab 04/21/19  0125   LABAERO Further report to follow     All pertinent labs within the past 24 hours have been reviewed.    Significant Imaging: I have reviewed all pertinent imaging results/findings within the past 24 hours.

## 2019-04-22 NOTE — PROGRESS NOTES
[facility]  Critical Care - Surgery  Progress Note    Patient Name: Mariann Huff  MRN: [id number]  Admission Date: 4/20/2019  Hospital Length of Stay: 2 days  Code Status: Full Code  Attending Provider: Paul Carlson MD  Primary Care Provider: Jasbir Haney MD   Principal Problem: Ureteral transection of left native kidney    Subjective:     Hospital/ICU Course:  No notes on file    Interval History/Significant Events: Lactate remains elevated overnight; slowly down trending with IVFs.  Remains on norepi gtt and vasopressin gtt overnight.  AST/ALT increasing and Plts dropping overnight.  Intubated and sedated.     Follow-up For: Procedure(s) (LRB):  Creation, Nephrostomy, Percutaneous (Left)    Post-Operative Day: 1 Day Post-Op    Objective:     Vital Signs (Most Recent):  Temp: 99.2 °F (37.3 °C) (04/21/19 1900)  Pulse: 79 (04/22/19 0426)  Resp: 20 (04/22/19 0426)  BP: 99/64 (04/22/19 0000)  SpO2: 99 % (04/22/19 0426) Vital Signs (24h Range):  Temp:  [97.7 °F (36.5 °C)-99.2 °F (37.3 °C)] 99.2 °F (37.3 °C)  Pulse:  [79-97] 79  Resp:  [0-35] 20  SpO2:  [92 %-100 %] 99 %  BP: ()/(55-70) 99/64  Arterial Line BP: ()/() 107/49     Weight: 72.6 kg (160 lb)  Body mass index is 27.46 kg/m².      Intake/Output Summary (Last 24 hours) at 4/22/2019 0545  Last data filed at 4/22/2019 0400  Gross per 24 hour   Intake 49067.35 ml   Output 3025 ml   Net 96008.35 ml       Physical Exam   Constitutional: She appears well-developed and well-nourished.   HENT:   Head: Normocephalic and atraumatic.   Intubated   Eyes: Right eye exhibits no discharge. Left eye exhibits no discharge.   Neck: Normal range of motion. Neck supple.   Cardiovascular: Normal rate and regular rhythm.   Pulmonary/Chest: Effort normal. No respiratory distress.   Abdominal:   Midline incision c/d/i.  MADHURI drainage SS.  Abdomen soft, non-distended.   Genitourinary:   Genitourinary Comments: Nephrostomy tube in place      Musculoskeletal: Normal range of motion.   Neurological:   Sedated    Skin: Skin is warm and dry.   Vitals reviewed.      Vents:  Vent Mode: A/C (04/22/19 0426)  Set Rate: 20 bmp (04/22/19 0426)  Vt Set: 450 mL (04/22/19 0426)  Pressure Support: 10 cmH20 (04/21/19 1720)  PEEP/CPAP: 5 cmH20 (04/22/19 0426)  Oxygen Concentration (%): 49 (04/22/19 0426)  Peak Airway Pressure: 44 cmH2O (04/22/19 0426)  Plateau Pressure: 0 cmH20 (04/22/19 0426)  Total Ve: 9.18 mL (04/22/19 0426)  F/VT Ratio<105 (RSBI): (!) 43.48 (04/22/19 0426)    Lines/Drains/Airways     Central Venous Catheter Line                 Percutaneous Central Line Insertion/Assessment - triple lumen  04/21/19 0100 right internal jugular 1 day          Drain                 Closed/Suction Drain 04/21/19 0300 LLQ 1 day         Urethral Catheter 04/20/19 1711 Latex 16 Fr. 1 day         NG/OG Tube 04/21/19 0728 Center mouth less than 1 day         Nephrostomy 04/21/19 0629 Left 8 Fr. less than 1 day         Rectal Tube 04/21/19 2100 rectal tube w/ balloon (indicate number of mLs) less than 1 day          Airway                 Airway - Non-Surgical 04/21/19 0029 Endotracheal Tube 1 day          Arterial Line                 Arterial Line 04/21/19 0031 Left Radial 1 day          Peripheral Intravenous Line                 Peripheral IV - Single Lumen 04/20/19 1650 18 G Left Antecubital 1 day         Peripheral IV - Single Lumen 04/20/19 1710 18 G Right Antecubital 1 day                Significant Labs:    CBC/Anemia Profile:  Recent Labs   Lab 04/21/19  1820 04/22/19  0100 04/22/19  0423   WBC 8.98 10.22 9.62   HGB 8.7* 8.9* 8.2*   HCT 27.5* 26.5* 25.2*   PLT 86* 87* 80*   MCV 89 86 85   RDW 13.6 13.8 13.9        Chemistries:  Recent Labs   Lab 04/21/19  1210 04/21/19  1918 04/22/19  0423    142 141   K 4.8 4.6 4.2    115* 109   CO2 13* 11* 16*   BUN 52* 44* 44*   CREATININE 2.6* 1.9* 1.9*   CALCIUM 8.5* 7.1* 7.9*   ALBUMIN 1.7* 1.4* 1.9*   PROT 5.2*  4.3* 5.1*   BILITOT 4.8* 4.7* 5.2*   ALKPHOS 120 102 103   * 446* 572*   * 1,697* 1,553*   MG 2.1 1.7 1.9   PHOS 5.0* 4.4 4.4       Lactic Acid:   Recent Labs   Lab 04/21/19  2117 04/22/19  0100 04/22/19  0423   LACTATE 9.2* 8.8* 7.5*       Significant Imaging:  I have reviewed and interpreted all pertinent imaging results/findings within the past 24 hours.    Assessment/Plan:     * Ureteral transection of left native kidney    ASSESSMENT/PLAN:   Mariann Huff is a 59 y.o. female s/p left ureteral injury on 4/12, who presented with fever, AMS, and intraabdominal abscess, taken for washout and ligation of left ureter with nephrostomy tube placement on 4/21.     Neuro:   - Sedated with propofol  - Pain control with fentanyl    Pulmonary:   - Intubated  - Daily CXR   - ABGs prn     Recent Labs     04/22/19  0426   PH 7.354   PCO2 27.1*   PO2 91   HCO3 15.1*   POCSATURATED 97   BE -10       Vent Mode: A/C  Oxygen Concentration (%):  [49-60] 49  Resp Rate Total:  [18 br/min-45 br/min] 20 br/min  Vt Set:  [450 mL] 450 mL  PEEP/CPAP:  [5 cmH20] 5 cmH20  Pressure Support:  [10 cmH20] 10 cmH20  Mean Airway Pressure:  [11 woN54-39 cmH20] 15 cmH20    Cardiac:   - Vaso gtt and Levo gtt titrating to MAP> 65  - TTE ordered yesterday, awaiting read    Renal:    - BUN/Cr elevated at 66/3.6 pre-operatively  - S/p transection of left ureter  - S/p PCT nephrostomy tube placement with IR 4/21  - Nephrology was consulted for evaluation of lactic acidosis; no need for CRRT at this time as pt has good UOP    - Monitor I/Os    Intake/Output Summary (Last 24 hours) at 4/22/2019 0709  Last data filed at 4/22/2019 0600  Gross per 24 hour   Intake 01292.35 ml   Output 3250 ml   Net 77562.35 ml     ID:   - Cont with Zosyn  - Blood cultures results pending  - UCx from 4/20 growing E. Coli; sensitivities pending. Repeat Cx pending.   - ID consulted for assistance with abx therapy    Hem/Onc:   - Hgb 6 post-op; received 2 units  pRBC  - H/H stable at this time; cont to monitor    Endocrine:  - DM Type 2 at baseline    - Insulin gtt  - Endocrine consulted for assistance     Fluids/Electrolytes/Nutrition/GI:   # Shock Liver   - Elevated LFTs; may be 2/2 to shock liver from hypotension    - Thrombocytopenia; plts cont to downtrend, 80 plts this AM   - INR ordered  # Lactic Acidosis   - Continues to remain elevated; pH is improving on ABG   - Cont to trend LA   - Received multiple boluses overnight   - Albumin admininistered    - VBG ordered to assess Mixed Venous O2  # Diarrhea   - Pt with multiple episodes of loose stool   - C.Diff panel ordered, results pending   - Contact precautions    - Replace electrolytes PRN  - NPO  - Will trend lactates and bolus accordingly    PPx:  - DVT: None until after IR treatment and can ensure pt not actively bleeding. Plts continue to drop.       DISPO:  - Continue SICU care           Critical care was time spent personally by me on the following activities: development of treatment plan with patient or surrogate and bedside caregivers, discussions with consultants, evaluation of patient's response to treatment, examination of patient, ordering and performing treatments and interventions, ordering and review of laboratory studies, ordering and review of radiographic studies, pulse oximetry, re-evaluation of patient's condition.  This critical care time did not overlap with that of any other provider or involve time for any procedures.     Baylee Ferreira DO  Critical Care - Surgery  [facility]

## 2019-04-22 NOTE — SUBJECTIVE & OBJECTIVE
Interval History:   Pressor requirements down on 0.01 of levo, and 0.04 of vaso. Lactate still elevated, at 7.5 today. Shock liver. Blood and urine with GNRs, urine with presumptive E. Coli.    Review of Systems  Objective:     Temp:  [98.2 °F (36.8 °C)-99.2 °F (37.3 °C)] 99.2 °F (37.3 °C)  Pulse:  [75-97] 75  Resp:  [0-35] 20  SpO2:  [92 %-100 %] 98 %  BP: ()/(56-70) 97/61  Arterial Line BP: ()/() 104/54     Body mass index is 27.46 kg/m².           Drains     Drain                 Closed/Suction Drain 04/21/19 0300 LLQ 1 day         NG/OG Tube 04/21/19 0728 Center mouth 1 day         Nephrostomy 04/21/19 0629 Left 8 Fr. 1 day         Urethral Catheter 04/20/19 1711 Latex 16 Fr. 1 day         Rectal Tube 04/21/19 2100 rectal tube w/ balloon (indicate number of mLs) less than 1 day                Physical Exam   Constitutional: She appears well-developed and well-nourished. She is sedated and intubated.   HENT:   Head: Normocephalic and atraumatic.   Neck: No JVD present.   Cardiovascular: Normal rate and regular rhythm.    Pulmonary/Chest: She is intubated.   mechanically ventilated   Abdominal: Soft. She exhibits distension. There is no rebound and no guarding.   Dressing c/d/i  MADHURI drain with minimal SS output   Genitourinary:   Genitourinary Comments: Fontenot draining clear yellow  Left neph tube with clear pink urine   Neurological:   Sedated   Skin: She is not diaphoretic. No pallor.       Significant Labs:    BMP:  Recent Labs   Lab 04/21/19  1210 04/21/19  1918 04/22/19  0423    142 141   K 4.8 4.6 4.2    115* 109   CO2 13* 11* 16*   BUN 52* 44* 44*   CREATININE 2.6* 1.9* 1.9*   CALCIUM 8.5* 7.1* 7.9*       CBC:   Recent Labs   Lab 04/21/19  1820 04/22/19  0100 04/22/19  0423   WBC 8.98 10.22 9.62   HGB 8.7* 8.9* 8.2*   HCT 27.5* 26.5* 25.2*   PLT 86* 87* 80*       All pertinent labs results from the past 24 hours have been reviewed.    Significant Imaging:  All pertinent imaging  results/findings from the past 24 hours have been reviewed.

## 2019-04-22 NOTE — HPI
Ms. Huff is a 58 y/o female with HTN, DMII, CAD, NSTEMI, s/p L5-S1 OLIF with NSGY 4/12, complicated by a left ureter transection, repaired with ureteroureteral anastomoses and ureteral stent placement. She was discharged with a correa and MADHURI drain that was removed on 4/16. She was seen by urology and anticipated stent removal in 2-3 months (6/2019).  She presented to ED on 4/20 with AMS and sepsis. She was febrile 103 in ED. WBC 5K on admit. Lactate 4.6. Cath UA with 3+leukocytes, >100 WBcs and many bacteria.     MRI showed postsurgical change of L5-S1 posterior spinal fusion and anterior interbody fusion and a 4.4 cm fluid fluid collection in the left anterior prevertebral soft tissues at the level of the L5-S1 disc space - urinoma was not excluded.  A CT showed air collection within the anterior paraspinous soft tissues through which the left ureter courses. Given that the ureter courses through this, communication of the ureter with this collection is not excluded.  Air also noted in the left renal collecting system, along the left ureter and within the urinary bladder.   This was not amendable for drainage by IR so she was taken to OR for washout and she underwent ex-lap of left ureter and left nephrostomy tube placement 4/21/19.  Per operative note, there were pockets of purulence noted and evacuated, sent for culture of left retroperitoneum to pole of left kidney. The stent was visible. Posterior to the stent there was a pocket of purulent fluid and exposed hardware along the spinal vertebrae. The tissue along the distal and proximal end of ureter were friable and unhealthy. She underwent left nephrostomy tube placement. The stent was removed and several metal clips were placed on proximal end of the ureteral. MADHURI drain was placed into left retroperitoneal.     Blood cultures were positive for E coli.  Urine cultures +E.coli and OR cultures were + for Staph lugdenensis (pan sensitive).   ID was consulted at  that time (see note of 4/22).  There was concern for hardware involvement and she was ultimately treated with ceftriaxone and rifampin with plan for 6 weeks of IV antibiotics (end date 6/4/19) with plan for oral suppressive therapy thereafter.        ID was reconsulted on 4/29/16 for fevers and leukocytosis.   Patient was broadened to Vanc, CTX and Rifampin, and later vanc was stopped.  Source of fevers and leukocytosis were unclear, though abdominal source was suspected.  Blood cultures were negative, CT abd showed a superficial fluid collection, C diff was negative, lines were changed, BAL was negative. She was noted to have a partially occluded thrombus in the RIJ and proximal subclavian.  Drug reaction was considered.  Patient improved and leukocytosis and fever resolved.    ID signed off with plans to complete Cefazolin and rifampin for 6 weeks through 6/4.    ID was again re-consulted on 5/20 for recurrent fever and leukocytosis.  Abdominal drain noted to be draining pus, she had some rectal bleeding, and CXR showed new Left basilar consolidation concerning for HCAP.  Endotracheal aspirate showed Pseudomonas. Blood cultures negative. Urine showed candida glabrata.  Antibiotics were broadened to Vancomycin, cefepime, rifampin and fluconazole and lines were exchanged.  A repeat CT abd showed persistent subcutaneous fluid collection noted to be larger, but decreased fat stranding.  She was treated with 7 day course of Cefepime for HAP, a 7 day course of high dose fluconazole for candida glabrata and then transitioned back to cefazolin plus rifampin for prior E Coli and Staph lugdenensis and duration was extended to 8 week with end date of 6/18.     ID is now reconsulted for recurrent fever to 101.1.  WBC 12.91

## 2019-04-22 NOTE — PROGRESS NOTES
Pharmacist Renal Dose Adjustment Note    Mariann Huff is a 59 y.o. female being treated with the medication piperacillin-tazobactam    Patient Data:    Vital Signs (Most Recent):  Temp: 99.2 °F (37.3 °C) (04/21/19 1900)  Pulse: 75 (04/22/19 0707)  Resp: 20 (04/22/19 0707)  BP: 97/61 (04/22/19 0600)  SpO2: 98 % (04/22/19 0707) Vital Signs (72h Range):  Temp:  [97.7 °F (36.5 °C)-103.1 °F (39.5 °C)]   Pulse:  []   Resp:  [0-38]   BP: ()/(44-70)   SpO2:  [92 %-100 %]   Arterial Line BP: ()/()      Recent Labs   Lab 04/21/19  1210 04/21/19  1918 04/22/19  0423   CREATININE 2.6* 1.9* 1.9*     Serum creatinine: 1.9 mg/dL (H) 04/22/19 0423  Estimated creatinine clearance: 31.5 mL/min (A)    Medication:piperacillin-tazobactam dose: 4.5 g frequency q12h will be changed to medication:piperacillin-tazobactam dose:4.5 g frequency:q8h    Pharmacist's Name: GRZEGORZ BERMUDEZ  Pharmacist's Extension: [phone]

## 2019-04-22 NOTE — ASSESSMENT & PLAN NOTE
60 y/o female with HTN, DMII, CAD, NSTEMI, s/p L5-S1 OLIF with NSGY 4/12 c/b intraoperative left ureteral injury and underwent ureteroureteral anastomoses and ureteral stent placement. She was discharged with a correa and MADHURI drain that was removed on 4/16. She was seen by urology and anticipated stent removal in 2-3 months (6/2019).  She presents to ED with AMS and sepsis found to have air and fluid collection of left anterior prevertebral soft tissue with left ureter involvement. She underwent ex-lap and washout on 4/21. She is found to have E.coli bacteremia. We are consulted for abx recommendations.      MRI: Postsurgical change of L5-S1 posterior spinal fusion and anterior interbody fusion with a 4.4 cm fluid fluid collection in the left anterior prevertebral soft tissues at the level of the L5-S1 disc space.Irregular flow void involving the right common iliac vein, underlying thrombus not excluded.     CT: air collection within the anterior paraspinous soft tissues through which the left ureter courses. Given that the ureter courses through this, communication of the ureter with this collection is not excluded. Air within the left renal collecting system, along the left ureter, and within the urinary bladder     Per op note,  There were pocket of purulence noted and evacuated, sent for culture of left retroperitoneum to pole of left kidney. The stent was visible. Posterior to the stent there was a pocket of purulent fluid and exposed hardware along the spinal vertebrae. The tissue along the distal and proximal end of ureter were friable and unhealthy. She underwent left nephrostomy tube placement. The stent was removed and several metal clips were placed on proximal end of the ureteral.     Blood cultures +E.coli Repeat blood cultures are positive.   Urine cultures +E.coli  C.diff negative       1. Discontinue zosyn, started ertapenem 1g daily pending susceptibilities as concerns for .    2. Repeat blood  cultures are positive. Repeat blood cultures tomorrow AM  3. Surgical cultures pending. Will follow  4. Contact isolation status.   5. Will tailor abx accordingly

## 2019-04-22 NOTE — PLAN OF CARE
Problem: Adult Inpatient Plan of Care  Goal: Plan of Care Review  Dx: Urosepsis  Hx: HTN, DM, CAD, NSTEMI, and back pain who presents to [facility] with altered mental status and sepsis.     4/12: L5-S1 OLIF with NSGY-intraoperative left ureteral injury with ureteroureteral anastomosis and ureteral stent placement. Did well and was discharged home  4/20: ED for AMS, temp 103, tachy  4/21: Admitted to SICU after emergent ex-lap, IR for L nephrostomy placement, levo, vaso, insulin, propofol, blood cultures, flotrac, 3L LR, 800 isolyte, 2 units PRBC, 5L bolus  4/22: 500 albumin    Nursing:  - accucheck Q2H  - MAP >65     - Q8H lactics     Outcome: Ongoing (interventions implemented as appropriate)  Pt withdrawing to pain. Vent on A/C, 50%, % of PEEP, O2 sats >95%. HR 70-80s, NSR. MAP maintained > 65. Propofol @ 15 mcg/kg/min, MIVF @ 200. Insulin gtt titrated per nomogram, currently off. UO >30 cc/hr. MADHURI drain ~75 cc serosanguinous output. Nephrostomy tube ~200 cc sanguinous output. BMS ~250 cc tan liquid output. 500 cc albumin bolus. Pt on contact for gram negative rods in blood culture. Pt and family updated on plan of care throughout shift. See flow sheet for assessment data.

## 2019-04-22 NOTE — HPI
Reason for Consult: Management of T2DM, Hyperglycemia     Surgical Procedure and Date:     04/12/2019: L5-S1 fusion per Dr George in [facility]     04/21/2019:         1. Exploratory laparotomy        2. Ligation of left ureter        3. Removal of left JJ ureteral stent        Diabetes diagnosis year: ANDRE    Home Diabetes Medications:  ANDRE  Toujeo 50 BID  Invokana 300mg daily   Novolog 35 units AM, 45 units PM, 35 units PM    How often checking glucose at home? ANDRE   BG readings on regimen: ANDRE  Hypoglycemia on the regimen?  ANDRE  Missed doses on regimen?  ANDRE    Diabetes Complications include:     Hyperglycemia and Diabetic retinopathy     Complicating diabetes co morbidities:   History of MI and MURTAZA, CAD, HLD    HPI:   Patient is a 59 y.o. female with a diagnosis of HTN, HLN, DM type 2, nonproliferative diabetic renopathy, CAD, NSTEMI, and back pain who presents to the ED with a complaint of altered mental status on 04/20/2019. Is now s/p Exploratory laparotomy, Ligation of left ureter, and Removal of left JJ ureteral stent. Endocrinology consulted to manage DM2/Hyperglycemia.    Lab Results   Component Value Date    HGBA1C 10.8 (H) 02/19/2019

## 2019-04-22 NOTE — ASSESSMENT & PLAN NOTE
ABG  7.35/27/15  -Mild acidemia.  Primary metabolic acidosis with respiratory compensation.  -lactate trending down.  On Propofol for sedation.    Recommendations:  -no acute need for RRT at this time  -Consider changing sedation medication from propofol, as this can also cause a lactic acidosis  -would aim for pH > 7.2.

## 2019-04-22 NOTE — SUBJECTIVE & OBJECTIVE
Past Medical History:   Diagnosis Date    Anticoagulant long-term use     Asthma     Back pain     CAD (coronary artery disease)     s/p stentimg  (2), (1)    Carotid artery stenosis     Diabetes mellitus type 2 in obese     HTN (hypertension), benign     Hyperlipidemia     Keloid cicatrix     NPDR (nonproliferative diabetic retinopathy) 2015    NSTEMI (non-ST elevated myocardial infarction)     Nuclear sclerosis - Right Eye 3/18/2014    Primary localized osteoarthrosis, lower leg 2014    Sleep apnea        Past Surgical History:   Procedure Laterality Date    CARDIAC CATHETERIZATION      cataract extraction left eye      cataracts      CATHETERIZATION, HEART, LEFT Left 2014    Performed by Wilman Kim [facility] at [facility] CATH LAB     SECTION, LOW TRANSVERSE      CORONARY ANGIOPLASTY      ESOPHAGOGASTRODUODENOSCOPY (EGD) N/A 2016    Performed by Gardenia Adamson MD at [facility] ENDO (4TH FLR)    EXCISION TURBINATE, SUBMUCOUS      FUSION, SPINE, LUMBAR, ANTERIOR APPROACH Left L5-S1 Anterior to Psoa Interbody Fusion, L5-S1 Posterior Instrumentation Left 2019    Performed by Mk George MD at [facility] OR 2ND FLR    HAND SURGERY Left     HAND SURGERY Right     torn ligament repair/ Dr. Yeboah    HYSTERECTOMY      Injection,steroid,epidural,transforaminal approach - Bilateral - S1 Bilateral 2018    Performed by Tl [facility]breu [facility] at [facility] PAIN MGT    Injection-steroid-epidural-caudal N/A 2019    Performed by Dave Bentley Jr., MD at [facility] PAIN MGT    INSERTION-INTRAOCULAR LENS (IOL) Right 2015    Performed by Good Domingo MD at [facility] OR 1ST FLR    left foot surgery      left wrist surgery      NASAL SEPTUM SURGERY  5/7/15    PHACOEMULSIFICATION-ASPIRATION-CATARACT Right 2015    Performed by Good Domingo MD at [facility] OR 1ST FLR    REPAIR, URETER  2019    Performed by Mk George MD at [facility] OR 2ND FLR     RESECTION-TURBINATES (SMR) N/A 5/7/2015    Performed by Dileep Dubois III, MD at [facility] OR 2ND FLR    rt elbow surgery      S/P LAD COATED STENT  05/14/2010    6 total     S/P OM1 STENT  08/2003    SEPTOPLASTY N/A 5/7/2015    [facility]    SINUS SURGERY      F.E.S.S.    SINUS SURGERY FUNCTIONAL ENDOSCOPIC WITH NAVIGATION WITH MAXILLARIES, ETHMOIDS, LEFT SPHENOID, LEFT LOLY N/A 5/7/2015    [facility]    STENT, URETERAL placement  4/12/2019    [facility]    TUBAL LIGATION         Review of patient's allergies indicates:  No Known Allergies  Current Facility-Administered Medications   Medication Frequency    0.9%  NaCl infusion (for blood administration) Q24H PRN    0.9%  NaCl infusion (for blood administration) Q24H PRN    0.9%  NaCl infusion Continuous    albuterol-ipratropium 2.5 mg-0.5 mg/3 mL nebulizer solution 3 mL Q4H PRN    fentaNYL injection 50 mcg Q15 Min PRN    Followed by    [START ON 4/24/2019] fentaNYL injection 50 mcg Q1H PRN    insulin regular (Humulin R) 100 Units in sodium chloride 0.9% 100 mL infusion Continuous    nitroGLYCERIN SL tablet 0.3 mg Q5 Min PRN    norepinephrine 4 mg in dextrose 5% 250 mL infusion (premix) (titrating) Continuous    piperacillin-tazobactam 4.5 g in sodium chloride 0.9% 100 mL IVPB (ready to mix system) Q12H    propofol (DIPRIVAN) 10 mg/mL infusion Continuous    sodium chloride 0.9% flush 10 mL PRN    vasopressin (PITRESSIN) 0.2 Units/mL in dextrose 5 % 100 mL infusion Continuous     Facility-Administered Medications Ordered in Other Encounters   Medication Frequency    lidocaine (PF) 10 mg/ml (1%) injection 10 mg Once     Family History     Problem Relation (Age of Onset)    Diabetes Mother, Father, Sister, Brother, Sister    Heart attack Father    Heart disease Mother    Leukemia Father    No Known Problems Sister,  Brother, Brother, Maternal Grandmother, Maternal Grandfather, Paternal Grandmother, Paternal Grandfather, Son, Son, Maternal Aunt, Maternal Uncle, Paternal Aunt, Paternal Uncle        Tobacco Use    Smoking status: Never Smoker    Smokeless tobacco: Never Used   Substance and Sexual Activity    Alcohol use: No     Alcohol/week: 0.0 oz    Drug use: No    Sexual activity: Yes     Partners: Male     Birth control/protection: Post-menopausal     Comment:      Review of Systems   Unable to perform ROS: Intubated     Objective:     Vital Signs (Most Recent):  Temp: 99.2 °F (37.3 °C) (04/21/19 1900)  Pulse: 83 (04/21/19 2130)  Resp: (!) 23 (04/21/19 2130)  BP: 93/62 (04/21/19 2000)  SpO2: 99 % (04/21/19 2130)  O2 Device (Oxygen Therapy): ventilator (04/21/19 2117) Vital Signs (24h Range):  Temp:  [97.7 °F (36.5 °C)-99.2 °F (37.3 °C)] 99.2 °F (37.3 °C)  Pulse:  [68-97] 83  Resp:  [0-34] 23  SpO2:  [92 %-100 %] 99 %  BP: ()/(44-70) 93/62  Arterial Line BP: ()/() 110/52     Weight: 72.6 kg (160 lb) (04/20/19 1715)  Body mass index is 27.46 kg/m².  Body surface area is 1.81 meters squared.    I/O last 3 completed shifts:  In: 74888.4 [I.V.:90806.4; Blood:547; IV Piggyback:4000]  Out: 4034 [Urine:3425; Drains:559; Blood:50]    Physical Exam   HENT:   Head: Atraumatic.   Neck: No JVD present.   Cardiovascular: Normal rate and regular rhythm.   Pulmonary/Chest: Effort normal. She has rales.   Abdominal: Soft. She exhibits no distension.   Musculoskeletal: She exhibits no edema or tenderness.   Skin: Skin is warm.       Significant Labs:  ABGs:   Recent Labs   Lab 04/21/19 2117   PH 7.361   PCO2 25.8*   HCO3 14.6*   POCSATURATED 95   BE -11     CBC:   Recent Labs   Lab 04/21/19  1820   WBC 8.98   RBC 3.10*   HGB 8.7*   HCT 27.5*   PLT 86*   MCV 89   MCH 28.1   MCHC 31.6*     CMP:   Recent Labs   Lab 04/21/19  1918   *   CALCIUM 7.1*   ALBUMIN 1.4*   PROT 4.3*      K 4.6   CO2 11*   CL  115*   BUN 44*   CREATININE 1.9*   ALKPHOS 102   *   AST 1,697*   BILITOT 4.7*     All labs within the past 24 hours have been reviewed.

## 2019-04-22 NOTE — SUBJECTIVE & OBJECTIVE
Interval History/Significant Events: Lactate remains elevated overnight; slowly down trending with IVFs.  Remains on norepi gtt and vasopressin gtt overnight.  AST/ALT increasing and Plts dropping overnight.  Intubated and sedated.     Follow-up For: Procedure(s) (LRB):  Creation, Nephrostomy, Percutaneous (Left)    Post-Operative Day: 1 Day Post-Op    Objective:     Vital Signs (Most Recent):  Temp: 99.2 °F (37.3 °C) (04/21/19 1900)  Pulse: 79 (04/22/19 0426)  Resp: 20 (04/22/19 0426)  BP: 99/64 (04/22/19 0000)  SpO2: 99 % (04/22/19 0426) Vital Signs (24h Range):  Temp:  [97.7 °F (36.5 °C)-99.2 °F (37.3 °C)] 99.2 °F (37.3 °C)  Pulse:  [79-97] 79  Resp:  [0-35] 20  SpO2:  [92 %-100 %] 99 %  BP: ()/(55-70) 99/64  Arterial Line BP: ()/() 107/49     Weight: 72.6 kg (160 lb)  Body mass index is 27.46 kg/m².      Intake/Output Summary (Last 24 hours) at 4/22/2019 0545  Last data filed at 4/22/2019 0400  Gross per 24 hour   Intake 47054.35 ml   Output 3025 ml   Net 96814.35 ml       Physical Exam   Constitutional: She appears well-developed and well-nourished.   HENT:   Head: Normocephalic and atraumatic.   Intubated   Eyes: Right eye exhibits no discharge. Left eye exhibits no discharge.   Neck: Normal range of motion. Neck supple.   Cardiovascular: Normal rate and regular rhythm.   Pulmonary/Chest: Effort normal. No respiratory distress.   Abdominal:   Midline incision c/d/i.  MADHURI drainage SS.  Abdomen soft, non-distended.   Genitourinary:   Genitourinary Comments: Nephrostomy tube in place     Musculoskeletal: Normal range of motion.   Neurological:   Sedated    Skin: Skin is warm and dry.   Vitals reviewed.      Vents:  Vent Mode: A/C (04/22/19 0426)  Set Rate: 20 bmp (04/22/19 0426)  Vt Set: 450 mL (04/22/19 0426)  Pressure Support: 10 cmH20 (04/21/19 1720)  PEEP/CPAP: 5 cmH20 (04/22/19 0426)  Oxygen Concentration (%): 49 (04/22/19 0426)  Peak Airway Pressure: 44 cmH2O (04/22/19 0426)  Plateau  Pressure: 0 cmH20 (04/22/19 0426)  Total Ve: 9.18 mL (04/22/19 0426)  F/VT Ratio<105 (RSBI): (!) 43.48 (04/22/19 0426)    Lines/Drains/Airways     Central Venous Catheter Line                 Percutaneous Central Line Insertion/Assessment - triple lumen  04/21/19 0100 right internal jugular 1 day          Drain                 Closed/Suction Drain 04/21/19 0300 LLQ 1 day         Urethral Catheter 04/20/19 1711 Latex 16 Fr. 1 day         NG/OG Tube 04/21/19 0728 Center mouth less than 1 day         Nephrostomy 04/21/19 0629 Left 8 Fr. less than 1 day         Rectal Tube 04/21/19 2100 rectal tube w/ balloon (indicate number of mLs) less than 1 day          Airway                 Airway - Non-Surgical 04/21/19 0029 Endotracheal Tube 1 day          Arterial Line                 Arterial Line 04/21/19 0031 Left Radial 1 day          Peripheral Intravenous Line                 Peripheral IV - Single Lumen 04/20/19 1650 18 G Left Antecubital 1 day         Peripheral IV - Single Lumen 04/20/19 1710 18 G Right Antecubital 1 day                Significant Labs:    CBC/Anemia Profile:  Recent Labs   Lab 04/21/19  1820 04/22/19  0100 04/22/19  0423   WBC 8.98 10.22 9.62   HGB 8.7* 8.9* 8.2*   HCT 27.5* 26.5* 25.2*   PLT 86* 87* 80*   MCV 89 86 85   RDW 13.6 13.8 13.9        Chemistries:  Recent Labs   Lab 04/21/19  1210 04/21/19  1918 04/22/19  0423    142 141   K 4.8 4.6 4.2    115* 109   CO2 13* 11* 16*   BUN 52* 44* 44*   CREATININE 2.6* 1.9* 1.9*   CALCIUM 8.5* 7.1* 7.9*   ALBUMIN 1.7* 1.4* 1.9*   PROT 5.2* 4.3* 5.1*   BILITOT 4.8* 4.7* 5.2*   ALKPHOS 120 102 103   * 446* 572*   * 1,697* 1,553*   MG 2.1 1.7 1.9   PHOS 5.0* 4.4 4.4       Lactic Acid:   Recent Labs   Lab 04/21/19  2117 04/22/19  0100 04/22/19  0423   LACTATE 9.2* 8.8* 7.5*       Significant Imaging:  I have reviewed and interpreted all pertinent imaging results/findings within the past 24 hours.

## 2019-04-22 NOTE — ASSESSMENT & PLAN NOTE
-volume assessment tricky in this patient, CVP is up at > 20, but she's hemodynamically unstable and on the vent, on the other hand and when seen in consult SVV of 10-11% would seem to indicate there is still some degree of fluid responsiveness, although as patient is occasionally breathing over the vent, accuracy of this parameter is less, would consider getting a follow up CXR and bedside echocardiogram to further help assess volume status, if volume needs to come off, at this time patient is making urine, so would anticipate response to diuretics, if needed

## 2019-04-22 NOTE — CONSULTS
[facility]  Nephrology  Consult Note    Patient Name: Mariann Huff  MRN: [id number]  Admission Date: 4/20/2019  Hospital Length of Stay: 1 days  Attending Provider: Florin Puckett MD   Primary Care Physician: Jasbir Haney MD  Principal Problem:Ureteral transection of left native kidney    Inpatient consult to Nephrology  Consult performed by: Christian Glass MD  Consult ordered by: Venkatesh Stokes MD  Reason for consult: PAPA        Subjective:     HPI: 58yo AAF with history of HTN, type 2 diabetes mellitus, CAD, NSTEMI, and back pain who presents to [facility] with altered mental status and sepsis. She underwent L5-S1 OLIF with NSGY on 4/12 and had intraoperative left ureteral injury with ureteroureteral anastomosis and ureteral stent placement. She did well initially and had correa and MADHURI drain removed on 4/16. She began having chills and altered mental status 2 days ago and this has progressively worsened. No complaints of pain.      Imaging showed air with minimal fluid near the surgical site with left ureter coursing through. There is air throughout the proximal collecting system which is decompressed with JJ ureteral stent in good position.     Taken for ex lap, ligation of left ureter and left neph tube placement on 4/21/19.  Currently patient is in the ICU, she has borderline hemodynamics and is on pressors.      Nephrology is consulted to assess need for RRT, mostly as it pertains to acidosis and elevated lactic acid level.      Past Medical History:   Diagnosis Date    Anticoagulant long-term use     Asthma     Back pain     CAD (coronary artery disease)     s/p stentimg 2003 (2),2009 (1)    Carotid artery stenosis     Diabetes mellitus type 2 in obese     HTN (hypertension), benign     Hyperlipidemia     Keloid cicatrix     NPDR (nonproliferative diabetic retinopathy) 8/17/2015    NSTEMI (non-ST elevated myocardial infarction)     Nuclear sclerosis - Right Eye 3/18/2014     Primary localized osteoarthrosis, lower leg 2014    Sleep apnea        Past Surgical History:   Procedure Laterality Date    CARDIAC CATHETERIZATION      cataract extraction left eye      cataracts      CATHETERIZATION, HEART, LEFT Left 2014    Performed by Wilman Kim MD at [facility] CATH LAB     SECTION, LOW TRANSVERSE      CORONARY ANGIOPLASTY      ESOPHAGOGASTRODUODENOSCOPY (EGD) N/A 2016    Performed by Gardenia Adamson MD at [facility] ENDO (4TH FLR)    EXCISION TURBINATE, SUBMUCOUS      FUSION, SPINE, LUMBAR, ANTERIOR APPROACH Left L5-S1 Anterior to Psoa Interbody Fusion, L5-S1 Posterior Instrumentation Left 2019    Performed by Mk George MD at [facility] OR 2ND FLR    HAND SURGERY Left     HAND SURGERY Right     torn ligament repair/ Dr. Yeboah    HYSTERECTOMY      Injection,steroid,epidural,transforaminal approach - Bilateral - S1 Bilateral 2018    Performed by Tl Abreu MD at [facility] PAIN MGT    Injection-steroid-epidural-caudal N/A 2019    Performed by Dave Bentley Jr., MD at [facility] PAIN MGT    INSERTION-INTRAOCULAR LENS (IOL) Right 2015    Performed by Good Domingo MD at [facility] OR 1ST FLR    left foot surgery      left wrist surgery      NASAL SEPTUM SURGERY  5/7/15    PHACOEMULSIFICATION-ASPIRATION-CATARACT Right 2015    Performed by Good Domingo MD at [facility] OR 1ST FLR    REPAIR, URETER  2019    Performed by Mk George MD at [facility] OR 2ND FLR    RESECTION-TURBINATES (SMR) N/A 2015    Performed by Dileep Dubois III, MD at [facility] OR 2ND FLR    rt elbow surgery      S/P LAD COATED STENT  2010    6 total     S/P OM1 STENT  2003    SEPTOPLASTY N/A 2015    Performed by Dileep Dubois III, MD at [facility] OR 2ND FLR    SINUS SURGERY      F.E.S.S.    SINUS SURGERY FUNCTIONAL ENDOSCOPIC WITH NAVIGATION WITH MAXILLARIES, ETHMOIDS, LEFT SPHENOID, LEFT LOLY N/A 2015    Performed by Dileep  CHARLOTTE Dubois III, MD at [facility] OR 2ND FLR    STENT, URETERAL placement  4/12/2019    Performed by Robin Boyd MD at [facility] OR 2ND FLR    TUBAL LIGATION         Review of patient's allergies indicates:  No Known Allergies  Current Facility-Administered Medications   Medication Frequency    0.9%  NaCl infusion (for blood administration) Q24H PRN    0.9%  NaCl infusion (for blood administration) Q24H PRN    0.9%  NaCl infusion Continuous    albuterol-ipratropium 2.5 mg-0.5 mg/3 mL nebulizer solution 3 mL Q4H PRN    fentaNYL injection 50 mcg Q15 Min PRN    Followed by    [START ON 4/24/2019] fentaNYL injection 50 mcg Q1H PRN    insulin regular (Humulin R) 100 Units in sodium chloride 0.9% 100 mL infusion Continuous    nitroGLYCERIN SL tablet 0.3 mg Q5 Min PRN    norepinephrine 4 mg in dextrose 5% 250 mL infusion (premix) (titrating) Continuous    piperacillin-tazobactam 4.5 g in sodium chloride 0.9% 100 mL IVPB (ready to mix system) Q12H    propofol (DIPRIVAN) 10 mg/mL infusion Continuous    sodium chloride 0.9% flush 10 mL PRN    vasopressin (PITRESSIN) 0.2 Units/mL in dextrose 5 % 100 mL infusion Continuous     Facility-Administered Medications Ordered in Other Encounters   Medication Frequency    lidocaine (PF) 10 mg/ml (1%) injection 10 mg Once     Family History     Problem Relation (Age of Onset)    Diabetes Mother, Father, Sister, Brother, Sister    Heart attack Father    Heart disease Mother    Leukemia Father    No Known Problems Sister, Brother, Brother, Maternal Grandmother, Maternal Grandfather, Paternal Grandmother, Paternal Grandfather, Son, Son, Maternal Aunt, Maternal Uncle, Paternal Aunt, Paternal Uncle        Tobacco Use    Smoking status: Never Smoker    Smokeless tobacco: Never Used   Substance and Sexual Activity    Alcohol use: No     Alcohol/week: 0.0 oz    Drug use: No    Sexual activity: Yes     Partners: Male     Birth control/protection: Post-menopausal     Comment:       Review of Systems   Unable to perform ROS: Intubated     Objective:     Vital Signs (Most Recent):  Temp: 99.2 °F (37.3 °C) (04/21/19 1900)  Pulse: 83 (04/21/19 2130)  Resp: (!) 23 (04/21/19 2130)  BP: 93/62 (04/21/19 2000)  SpO2: 99 % (04/21/19 2130)  O2 Device (Oxygen Therapy): ventilator (04/21/19 2117) Vital Signs (24h Range):  Temp:  [97.7 °F (36.5 °C)-99.2 °F (37.3 °C)] 99.2 °F (37.3 °C)  Pulse:  [68-97] 83  Resp:  [0-34] 23  SpO2:  [92 %-100 %] 99 %  BP: ()/(44-70) 93/62  Arterial Line BP: ()/() 110/52     Weight: 72.6 kg (160 lb) (04/20/19 1715)  Body mass index is 27.46 kg/m².  Body surface area is 1.81 meters squared.    I/O last 3 completed shifts:  In: 65873.4 [I.V.:45130.4; Blood:547; IV Piggyback:4000]  Out: 4034 [Urine:3425; Drains:559; Blood:50]    Physical Exam   HENT:   Head: Atraumatic.   Neck: No JVD present.   Cardiovascular: Normal rate and regular rhythm.   Pulmonary/Chest: Effort normal. She has rales.   Abdominal: Soft. She exhibits no distension.   Musculoskeletal: She exhibits no edema or tenderness.   Skin: Skin is warm.       Significant Labs:  ABGs:   Recent Labs   Lab 04/21/19 2117   PH 7.361   PCO2 25.8*   HCO3 14.6*   POCSATURATED 95   BE -11     CBC:   Recent Labs   Lab 04/21/19 1820   WBC 8.98   RBC 3.10*   HGB 8.7*   HCT 27.5*   PLT 86*   MCV 89   MCH 28.1   MCHC 31.6*     CMP:   Recent Labs   Lab 04/21/19 1918   *   CALCIUM 7.1*   ALBUMIN 1.4*   PROT 4.3*      K 4.6   CO2 11*   *   BUN 44*   CREATININE 1.9*   ALKPHOS 102   *   AST 1,697*   BILITOT 4.7*     All labs within the past 24 hours have been reviewed.      Assessment/Plan:     At risk for fluid volume overload  -volume assessment tricky in this patient, CVP is up at > 20, but she's hemodynamically unstable and on the vent, on the other hand and when seen in consult SVV of 10-11% would seem to indicate there is still some degree of fluid responsiveness, although as patient  is occasionally breathing over the vent, accuracy of this parameter is less, would consider getting a follow up CXR and bedside echocardiogram to further help assess volume status, if volume needs to come off, at this time patient is making urine, so would anticipate response to diuretics, if needed    Acidosis  ABG (prior to bicarb administration this evening): 7.356/20.9/11.7    In short, mild +/- acidemia with a primary metabolic acidosis, expected pCO2 would be 26 +/-2, hence this patient with a pCO2 of 20.9 also has a mild secondary respiratory alkalosis    Etiology for metabolic component appears to be a probably mostly type A lactic acidosis and secondary poor perfusion / shock, etiology for the alkalosis component is the vent settings    Recommendations:  -would not recommend RRT for an elevated lactic acid in isolation  -further would not recommend giving bicarb for a lactic acidosis with a pH > 7.2  -recommend continuing working on maintaining hemodynamics      PAPA (acute kidney injury)  PAPA, at time of consult non-oliguric, baseline normal renal function, since 3am through 7pm today, sCr has improved 3.4 --> 1.9; lytes are noted for K of 4.6.  Likely PAPA etiology is iATN, in patient w/ borderline hemodynamics, s/p emergent surgery and need for pressors to maintain MAP > 65.      Plan/Recommendations:  1) would continue adequately fluid resuscitating patient with isotonic fluids, use pressors as needed to maintain MAP > 65  2) noted AST >> ALT, thought is patient is suffering from shock liver, but the out of proportion AST elevation compared to ALT would raise concern for possible rhabdo, will check CPK  3) repeat UA, UPC and urine sodium  4) renal ultrasound  5) no current indication for RRT, but need to follow closely with strict Is & Os & serial RFPs              Thank you for your consult. I will follow-up with patient. Please contact us if you have any additional questions.    Christian Glass,  MD  Nephrology  [facility]

## 2019-04-22 NOTE — ASSESSMENT & PLAN NOTE
PAPA, at time of consult non-oliguric, baseline normal renal function, since 3am through 7pm today, sCr has improved 3.4 --> 1.9; lytes are noted for K of 4.6.  Likely PAPA etiology is iATN, in patient w/ borderline hemodynamics, s/p emergent surgery and need for pressors to maintain MAP > 65.      Plan/Recommendations:  1) would continue adequately fluid resuscitating patient with isotonic fluids, use pressors as needed to maintain MAP > 65  2) noted AST >> ALT, thought is patient is suffering from shock liver, but the out of proportion AST elevation compared to ALT would raise concern for possible rhabdo, will check CPK  3) repeat UA, UPC and urine sodium  4) renal ultrasound  5) no current indication for RRT, but need to follow closely with strict Is & Os & serial RFPs

## 2019-04-23 LAB
ABO + RH BLD: NORMAL
ALBUMIN SERPL BCP-MCNC: 1.8 G/DL (ref 3.5–5.2)
ALP SERPL-CCNC: 133 U/L (ref 55–135)
ALT SERPL W/O P-5'-P-CCNC: 517 U/L (ref 10–44)
ANION GAP SERPL CALC-SCNC: 10 MMOL/L (ref 8–16)
ANISOCYTOSIS BLD QL SMEAR: SLIGHT
AST SERPL-CCNC: 734 U/L (ref 10–40)
BACTERIA BLD CULT: NORMAL
BACTERIA UR CULT: NORMAL
BASOPHILS # BLD AUTO: 0.05 K/UL (ref 0–0.2)
BASOPHILS NFR BLD: 0.5 % (ref 0–1.9)
BILIRUB SERPL-MCNC: 5.8 MG/DL (ref 0.1–1)
BLD GP AB SCN CELLS X3 SERPL QL: NORMAL
BODY FLUID SOURCE, CREATININE: NORMAL
BUN SERPL-MCNC: 43 MG/DL (ref 6–20)
BURR CELLS BLD QL SMEAR: ABNORMAL
CALCIUM SERPL-MCNC: 8.2 MG/DL (ref 8.7–10.5)
CHLORIDE SERPL-SCNC: 115 MMOL/L (ref 95–110)
CO2 SERPL-SCNC: 21 MMOL/L (ref 23–29)
CREAT FLD-MCNC: 1.7 MG/DL
CREAT SERPL-MCNC: 1.9 MG/DL (ref 0.5–1.4)
DIFFERENTIAL METHOD: ABNORMAL
EOSINOPHIL # BLD AUTO: 0.1 K/UL (ref 0–0.5)
EOSINOPHIL NFR BLD: 1.3 % (ref 0–8)
ERYTHROCYTE [DISTWIDTH] IN BLOOD BY AUTOMATED COUNT: 14.3 % (ref 11.5–14.5)
EST. GFR  (AFRICAN AMERICAN): 33 ML/MIN/1.73 M^2
EST. GFR  (NON AFRICAN AMERICAN): 28.7 ML/MIN/1.73 M^2
GLUCOSE SERPL-MCNC: 99 MG/DL (ref 70–110)
HCT VFR BLD AUTO: 24.5 % (ref 37–48.5)
HGB BLD-MCNC: 8 G/DL (ref 12–16)
HYPOCHROMIA BLD QL SMEAR: ABNORMAL
IMM GRANULOCYTES # BLD AUTO: 0.09 K/UL (ref 0–0.04)
IMM GRANULOCYTES NFR BLD AUTO: 0.8 % (ref 0–0.5)
INR PPP: 1.2 (ref 0.8–1.2)
LACTATE SERPL-SCNC: 1.1 MMOL/L (ref 0.5–2.2)
LACTATE SERPL-SCNC: 1.7 MMOL/L (ref 0.5–2.2)
LACTATE SERPL-SCNC: 2 MMOL/L (ref 0.5–2.2)
LYMPHOCYTES # BLD AUTO: 0.8 K/UL (ref 1–4.8)
LYMPHOCYTES NFR BLD: 7.8 % (ref 18–48)
MAGNESIUM SERPL-MCNC: 1.8 MG/DL (ref 1.6–2.6)
MCH RBC QN AUTO: 28.2 PG (ref 27–31)
MCHC RBC AUTO-ENTMCNC: 32.7 G/DL (ref 32–36)
MCV RBC AUTO: 86 FL (ref 82–98)
MONOCYTES # BLD AUTO: 0.4 K/UL (ref 0.3–1)
MONOCYTES NFR BLD: 3.5 % (ref 4–15)
NEUTROPHILS # BLD AUTO: 9.1 K/UL (ref 1.8–7.7)
NEUTROPHILS NFR BLD: 86.1 % (ref 38–73)
NRBC BLD-RTO: 0 /100 WBC
OVALOCYTES BLD QL SMEAR: ABNORMAL
PHOSPHATE SERPL-MCNC: 2.2 MG/DL (ref 2.7–4.5)
PLATELET # BLD AUTO: 101 K/UL (ref 150–350)
PLATELET BLD QL SMEAR: ABNORMAL
PMV BLD AUTO: 11.8 FL (ref 9.2–12.9)
POCT GLUCOSE: 101 MG/DL (ref 70–110)
POCT GLUCOSE: 109 MG/DL (ref 70–110)
POCT GLUCOSE: 109 MG/DL (ref 70–110)
POCT GLUCOSE: 111 MG/DL (ref 70–110)
POIKILOCYTOSIS BLD QL SMEAR: SLIGHT
POLYCHROMASIA BLD QL SMEAR: ABNORMAL
POTASSIUM SERPL-SCNC: 3.9 MMOL/L (ref 3.5–5.1)
PROT SERPL-MCNC: 5.2 G/DL (ref 6–8.4)
PROTHROMBIN TIME: 12.6 SEC (ref 9–12.5)
RBC # BLD AUTO: 2.84 M/UL (ref 4–5.4)
SODIUM SERPL-SCNC: 146 MMOL/L (ref 136–145)
WBC # BLD AUTO: 10.59 K/UL (ref 3.9–12.7)

## 2019-04-23 PROCEDURE — 99900026 HC AIRWAY MAINTENANCE (STAT)

## 2019-04-23 PROCEDURE — 80053 COMPREHEN METABOLIC PANEL: CPT

## 2019-04-23 PROCEDURE — 25000003 PHARM REV CODE 250: Performed by: ANESTHESIOLOGY

## 2019-04-23 PROCEDURE — 85610 PROTHROMBIN TIME: CPT

## 2019-04-23 PROCEDURE — 86901 BLOOD TYPING SEROLOGIC RH(D): CPT

## 2019-04-23 PROCEDURE — 99232 SBSQ HOSP IP/OBS MODERATE 35: CPT | Mod: ,,, | Performed by: NURSE PRACTITIONER

## 2019-04-23 PROCEDURE — C9113 INJ PANTOPRAZOLE SODIUM, VIA: HCPCS | Performed by: ANESTHESIOLOGY

## 2019-04-23 PROCEDURE — 87040 BLOOD CULTURE FOR BACTERIA: CPT | Mod: 59

## 2019-04-23 PROCEDURE — 84100 ASSAY OF PHOSPHORUS: CPT

## 2019-04-23 PROCEDURE — 99291 PR CRITICAL CARE, E/M 30-74 MINUTES: ICD-10-PCS | Mod: GC,,, | Performed by: SURGERY

## 2019-04-23 PROCEDURE — 63600175 PHARM REV CODE 636 W HCPCS: Performed by: PHYSICIAN ASSISTANT

## 2019-04-23 PROCEDURE — 82570 ASSAY OF URINE CREATININE: CPT

## 2019-04-23 PROCEDURE — 27000221 HC OXYGEN, UP TO 24 HOURS

## 2019-04-23 PROCEDURE — 82570 ASSAY OF URINE CREATININE: CPT | Mod: 91

## 2019-04-23 PROCEDURE — 25000003 PHARM REV CODE 250: Performed by: STUDENT IN AN ORGANIZED HEALTH CARE EDUCATION/TRAINING PROGRAM

## 2019-04-23 PROCEDURE — 63600175 PHARM REV CODE 636 W HCPCS: Performed by: STUDENT IN AN ORGANIZED HEALTH CARE EDUCATION/TRAINING PROGRAM

## 2019-04-23 PROCEDURE — 85025 COMPLETE CBC W/AUTO DIFF WBC: CPT

## 2019-04-23 PROCEDURE — 99233 SBSQ HOSP IP/OBS HIGH 50: CPT | Mod: ,,, | Performed by: PHYSICIAN ASSISTANT

## 2019-04-23 PROCEDURE — 99291 CRITICAL CARE FIRST HOUR: CPT | Mod: GC,,, | Performed by: SURGERY

## 2019-04-23 PROCEDURE — 83605 ASSAY OF LACTIC ACID: CPT | Mod: 91

## 2019-04-23 PROCEDURE — 99900035 HC TECH TIME PER 15 MIN (STAT)

## 2019-04-23 PROCEDURE — 63600175 PHARM REV CODE 636 W HCPCS: Performed by: ANESTHESIOLOGY

## 2019-04-23 PROCEDURE — 83735 ASSAY OF MAGNESIUM: CPT

## 2019-04-23 PROCEDURE — 94003 VENT MGMT INPAT SUBQ DAY: CPT

## 2019-04-23 PROCEDURE — 94761 N-INVAS EAR/PLS OXIMETRY MLT: CPT

## 2019-04-23 PROCEDURE — 99232 PR SUBSEQUENT HOSPITAL CARE,LEVL II: ICD-10-PCS | Mod: ,,, | Performed by: NURSE PRACTITIONER

## 2019-04-23 PROCEDURE — 99233 PR SUBSEQUENT HOSPITAL CARE,LEVL III: ICD-10-PCS | Mod: ,,, | Performed by: PHYSICIAN ASSISTANT

## 2019-04-23 PROCEDURE — 20000000 HC ICU ROOM

## 2019-04-23 RX ORDER — HEPARIN SODIUM 5000 [USP'U]/ML
5000 INJECTION, SOLUTION INTRAVENOUS; SUBCUTANEOUS EVERY 8 HOURS
Status: DISCONTINUED | OUTPATIENT
Start: 2019-04-23 | End: 2019-04-25

## 2019-04-23 RX ORDER — GLUCAGON 1 MG
1 KIT INJECTION
Status: DISCONTINUED | OUTPATIENT
Start: 2019-04-23 | End: 2019-04-25

## 2019-04-23 RX ORDER — INSULIN ASPART 100 [IU]/ML
0-5 INJECTION, SOLUTION INTRAVENOUS; SUBCUTANEOUS EVERY 6 HOURS PRN
Status: DISCONTINUED | OUTPATIENT
Start: 2019-04-23 | End: 2019-04-25

## 2019-04-23 RX ORDER — SODIUM CHLORIDE, SODIUM LACTATE, POTASSIUM CHLORIDE, CALCIUM CHLORIDE 600; 310; 30; 20 MG/100ML; MG/100ML; MG/100ML; MG/100ML
INJECTION, SOLUTION INTRAVENOUS CONTINUOUS
Status: DISCONTINUED | OUTPATIENT
Start: 2019-04-23 | End: 2019-04-24

## 2019-04-23 RX ADMIN — CEFTRIAXONE 2 G: 2 INJECTION, SOLUTION INTRAVENOUS at 04:04

## 2019-04-23 RX ADMIN — CHLORHEXIDINE GLUCONATE 0.12% ORAL RINSE 15 ML: 1.2 LIQUID ORAL at 09:04

## 2019-04-23 RX ADMIN — HEPARIN SODIUM 5000 UNITS: 5000 INJECTION, SOLUTION INTRAVENOUS; SUBCUTANEOUS at 09:04

## 2019-04-23 RX ADMIN — SODIUM CHLORIDE, SODIUM LACTATE, POTASSIUM CHLORIDE, AND CALCIUM CHLORIDE: .6; .31; .03; .02 INJECTION, SOLUTION INTRAVENOUS at 11:04

## 2019-04-23 RX ADMIN — MAGNESIUM SULFATE HEPTAHYDRATE 2 G: 40 INJECTION, SOLUTION INTRAVENOUS at 07:04

## 2019-04-23 RX ADMIN — PROPOFOL 10 MCG/KG/MIN: 10 INJECTION, EMULSION INTRAVENOUS at 11:04

## 2019-04-23 RX ADMIN — HEPARIN SODIUM 5000 UNITS: 5000 INJECTION, SOLUTION INTRAVENOUS; SUBCUTANEOUS at 11:04

## 2019-04-23 RX ADMIN — CHLORHEXIDINE GLUCONATE 0.12% ORAL RINSE 15 ML: 1.2 LIQUID ORAL at 08:04

## 2019-04-23 RX ADMIN — POTASSIUM CHLORIDE 40 MEQ: 400 INJECTION, SOLUTION INTRAVENOUS at 08:04

## 2019-04-23 RX ADMIN — PANTOPRAZOLE SODIUM 40 MG: 40 INJECTION, POWDER, LYOPHILIZED, FOR SOLUTION INTRAVENOUS at 08:04

## 2019-04-23 NOTE — ASSESSMENT & PLAN NOTE
59F with recent L5-S1 OLIF with intraoperative ureteral injury, repaired by urology intraoperatively, with stent placement that presents with AMS, fevers, concern for sepsis. Urinalysis with innumerable WBCs and bacteria     Neuro stable.  Wean sedation for neuro checks.   CV- goal normotension.    Pulm- intubated, WTE when safe.   FENGI- goal eunatremia.   Heme/ID- recommendation for sepsis treatment. Abx changed to ertapenem per ID  Renal- f/u urology recommendations    Dispo- will follow closely.

## 2019-04-23 NOTE — PROGRESS NOTES
[facility]  Critical Care - Surgery  Progress Note    Patient Name: Mariann Huff  MRN: [id number]  Admission Date: 4/20/2019  Hospital Length of Stay: 3 days  Code Status: Full Code  Attending Provider: Paul Carlson MD  Primary Care Provider: Jasbir Haney MD   Principal Problem: Ureteral transection of left native kidney    Subjective:     Hospital/ICU Course:  No notes on file    Interval History/Significant Events: NAEON. Pt has been weaned from pressors. Watery output from rectal tube and OG tube.  She remains intubated.  Plan for SBT this morning.      Follow-up For: Procedure(s) (LRB):  Creation, Nephrostomy, Percutaneous (Left)    Post-Operative Day: 2 Days Post-Op    Objective:     Vital Signs (Most Recent):  Temp: 98.6 °F (37 °C) (04/23/19 0300)  Pulse: 79 (04/23/19 0600)  Resp: 20 (04/23/19 0600)  BP: (!) 126/58 (04/23/19 0600)  SpO2: 97 % (04/23/19 0600) Vital Signs (24h Range):  Temp:  [98.6 °F (37 °C)-99.8 °F (37.7 °C)] 98.6 °F (37 °C)  Pulse:  [69-81] 79  Resp:  [14-37] 20  SpO2:  [83 %-100 %] 97 %  BP: ()/() 126/58  Arterial Line BP: ()/(43-75) 78/55     Weight: 72.6 kg (160 lb)  Body mass index is 27.46 kg/m².      Intake/Output Summary (Last 24 hours) at 4/23/2019 0630  Last data filed at 4/23/2019 0600  Gross per 24 hour   Intake 4584 ml   Output 3136 ml   Net 1448 ml       Physical Exam   Constitutional: She appears well-developed and well-nourished.   HENT:   Head: Normocephalic and atraumatic.   Intubated   Eyes: Right eye exhibits no discharge. Left eye exhibits no discharge.   Neck: Normal range of motion. Neck supple.   Cardiovascular: Normal rate and regular rhythm.   Pulmonary/Chest: Effort normal. No respiratory distress.   Intubated   Abdominal:   Midline incision c/d/i.  MADHURI drainage SS.  Abdomen soft, non-distended. Rectal tube with watery output. OG tube with watery output.   Genitourinary:   Genitourinary Comments: Nephrostomy tube in place with  blood tinged output.     Musculoskeletal: Normal range of motion.   Neurological:   Sedated    Skin: Skin is warm and dry.   Vitals reviewed.      Vents:  Vent Mode: Spont (04/23/19 0555)  Set Rate: 20 bmp (04/23/19 0555)  Vt Set: 450 mL (04/23/19 0555)  Pressure Support: 10 cmH20 (04/23/19 0555)  PEEP/CPAP: 5 cmH20 (04/23/19 0555)  Oxygen Concentration (%): 40 (04/23/19 0600)  Peak Airway Pressure: 15 cmH2O (04/23/19 0555)  Plateau Pressure: 35 cmH20 (04/23/19 0555)  Total Ve: 7.31 mL (04/23/19 0555)  F/VT Ratio<105 (RSBI): 119.05 (04/23/19 0555)    Lines/Drains/Airways     Central Venous Catheter Line                 Percutaneous Central Line Insertion/Assessment - triple lumen  04/21/19 0100 right internal jugular 2 days          Drain                 Closed/Suction Drain 04/21/19 0300 LLQ 2 days         Nephrostomy 04/21/19 0629 Left 8 Fr. 2 days         Urethral Catheter 04/20/19 1711 Latex 16 Fr. 2 days         NG/OG Tube 04/21/19 0728 Center mouth 1 day         Rectal Tube 04/21/19 2100 rectal tube w/ balloon (indicate number of mLs) 1 day          Airway                 Airway - Non-Surgical 04/21/19 0029 Endotracheal Tube 2 days          Arterial Line                 Arterial Line 04/21/19 0031 Left Radial 2 days          Peripheral Intravenous Line                 Peripheral IV - Single Lumen 04/20/19 1650 18 G Left Antecubital 2 days         Peripheral IV - Single Lumen 04/20/19 1710 18 G Right Antecubital 2 days                Significant Labs:    CBC/Anemia Profile:  Recent Labs   Lab 04/22/19  0423 04/22/19  1207 04/23/19  0400   WBC 9.62 9.73 10.59   HGB 8.2* 8.2* 8.0*   HCT 25.2* 24.1* 24.5*   PLT 80* 86* 101*   MCV 85 85 86   RDW 13.9 13.9 14.3        Chemistries:  Recent Labs   Lab 04/21/19  1918 04/22/19  0423 04/22/19  1701 04/23/19  0400    141 144 146*   K 4.6 4.2 3.4* 3.9   * 109 112* 115*   CO2 11* 16* 21* 21*   BUN 44* 44* 43* 43*   CREATININE 1.9* 1.9* 1.9* 1.9*   CALCIUM 7.1*  7.9* 8.0* 8.2*   ALBUMIN 1.4* 1.9* 2.1* 1.8*   PROT 4.3* 5.1*  --  5.2*   BILITOT 4.7* 5.2*  --  5.8*   ALKPHOS 102 103  --  133   * 572*  --  517*   AST 1,697* 1,553*  --  734*   MG 1.7 1.9  --  1.8   PHOS 4.4 4.4 3.0 2.2*       ABGs:   Recent Labs   Lab 04/22/19  0915   PH 7.312*   PCO2 31.5*   HCO3 16.0*   POCSATURATED 64*   BE -10     Blood Culture:   Recent Labs   Lab 04/21/19  1835   LABBLOO Gram stain aer bottle: Gram negative rods  Results called to and read back by: Carmenza Wallace RN 04/22/2019  10:14     Respiratory Culture: No results for input(s): GSRESP, RESPIRATORYC in the last 48 hours.  Urine Culture:   Recent Labs   Lab 04/21/19  0640   LABURIN No growth     Urine Studies:   Recent Labs   Lab 04/21/19  2256   COLORU Argelia   APPEARANCEUA Cloudy*   PHUR 5.0   SPECGRAV 1.020   PROTEINUA 1+*   GLUCUA 3+*   KETONESU Negative   BILIRUBINUA Negative   OCCULTUA 3+*   NITRITE Negative   LEUKOCYTESUR 1+*   RBCUA 14*   WBCUA 51*   BACTERIA Moderate*   SQUAMEPITHEL 3   HYALINECASTS 3*     All pertinent labs within the past 24 hours have been reviewed.    Significant Imaging:  I have reviewed and interpreted all pertinent imaging results/findings within the past 24 hours.    Assessment/Plan:     * Ureteral transection of left native kidney    ASSESSMENT/PLAN:   Mariann Huff is a 59 y.o. female s/p left ureteral injury on 4/12, who presented with fever, AMS, and intraabdominal abscess, taken for washout and ligation of left ureter with nephrostomy tube placement on 4/21.     Neuro:   - Sedated with propofol. Hold sedation for SBT this AM.   - Pain control with fentanyl    Pulmonary:   - Intubated  - Daily CXR   - ABGs prn   - Plan for SBT this AM    Recent Labs     04/22/19  0915   PH 7.312*   PCO2 31.5*   PO2 36*   HCO3 16.0*   POCSATURATED 64*   BE -10       Vent Mode: Spont  Oxygen Concentration (%):  [40-51] 40  Resp Rate Total:  [20 br/min-37 br/min] 20 br/min  Vt Set:  [450 mL] 450 mL  PEEP/CPAP:   [5 cmH20] 5 cmH20  Pressure Support:  [10 cmH20] 10 cmH20  Mean Airway Pressure:  [8.6 cmH20-15 cmH20] 8.6 cmH20    Cardiac:   - Vaso gtt and Levo gtt titrating to MAP> 65  - TTE ordered yesterday, EF 55%    Renal:    - S/p transection of left ureter 4/12 and subsequent ligation and PCT nephrostomy tube placement with IR 4/21  - BUN 43 and Cr 1.9  remains elevated but stable,   - UOP Total 1250/ 24 hrs;  315 from PCT Nephrostomy; 935 from correa  - Monitor I/Os    Intake/Output Summary (Last 24 hours) at 4/23/2019 0643  Last data filed at 4/23/2019 0600  Gross per 24 hour   Intake 4584 ml   Output 3136 ml   Net 1448 ml     ID:   - Blood cultures + Gram negative rods  - UCx from 4/20 growing E. Coli; sensitivities are now back. Repeat Cx pending.   - ID consulted for assistance with abx therapy; Zosyn discontinued and started on Ertapenem.     Hem/Onc:   - Hgb 6 post-op; received 2 units pRBC at that time.  - H/H stable at this time; cont to monitor    Endocrine:  - DM Type 2 at baseline    - Pt has been off insulin gtt overnight; discontinued and started on LDSSI for NPO    Fluids/Electrolytes/Nutrition/GI:   # Shock Liver   - Labs show improvement   - Elevated LFTs now down trending; may be 2/2 to shock liver from hypotension    - Thrombocytopenia; plts up to 101 from 80 yesterday; cont to monitor   - INR elevated to 2.1 yesterday, now improved to 1.2    # Lactic Acidosis   - Now resolved; LA 1.7 this AM    # Diarrhea   - Pt with multiple episodes of loose stool from rectal tube; 1250 ml watery stool overnight   - C.Diff panel Negative    - Replace electrolytes PRN  - NPO      PPx:  - DVT: Plan to restart DVT ppx today       DISPO:  - Continue SICU care  - Plan SBT today              Critical care was time spent personally by me on the following activities: development of treatment plan with patient or surrogate and bedside caregivers, discussions with consultants, evaluation of patient's response to treatment,  examination of patient, ordering and performing treatments and interventions, ordering and review of laboratory studies, ordering and review of radiographic studies, pulse oximetry, re-evaluation of patient's condition.  This critical care time did not overlap with that of any other provider or involve time for any procedures.     Baylee Ferreira, DO  Critical Care - Surgery  [facility]

## 2019-04-23 NOTE — PHYSICIAN QUERY
1. Increase sodium intake.  2. Increase fluid intake.  3. Obtain CBC and thyroid panel.  4. FATMATA hose, wiggle feet before patient gets up out of bed.  5. Zio patch  6. Consider tilt table.     PT Name: Mariann Huff  MR #: [id number]     CDS/: Liliane Durham RN             Contact information: [phone]  This form is a permanent document in the medical record.     Query Date: April 23, 2019    Physician Query - Neurological Condition Clarification    By submitting this query, we are merely seeking further clarification of documentation to reflect the severity of illness of your patient. Please utilize your independent clinical judgment when addressing the question(s) below.    The Medical record reflects the following:     Indicators   Supporting Clinical Findings Location in Medical Record   x AMS, Confusion,  LOC, etc.  CC: AMS   complaint of altered mental status. She was brought in via EMS. EMS personnel received the call after her family  noticed that she was febrile and altered. She had a fusion of left lumbar spine by anterior approach last week   4/20 ED MD note   x Acute / Chronic Illness Encephalopathy- likley in setting of infection.   Admitted to MICU 4/20/19 for sepsis possibly 2/2 abscess and UTI     Septic shock  Metabolic acidosis  papa  Post operative anemia  PAPA likely 2/2 ischemic ATN  Acute respiratory failure   4/20 HP      4/22 CC MD      4/22 Renal MD    4/22 CC MD    Radiology Findings     x Electrolyte Imbalance Ca= 7.1, 7.9  Na= 146  K= 3.4 4/20-23 Lab   x Medication NS 5641mL Bolus  Vasopressin Gtt  Norepinephrine gtt  Piperacillin IVPB  NS 200cc/hr  Albunin 25gm IVPB  Ertapenem IVPB  LR 2500cc/hr  Vancomycin IVPB   4/20-21 MAR      4/22 MAR  4/21-23 MAR  4/22 MAR    4/21-22 MAR  4/20 MAR     x Treatment         Mechanical ventilation  Continuous oxygen  Cardiac monitoring  Blood culture  Urine culture  ID consult  Renal consult  CC consult  Daily CBC, CMP  CXR         1. Exploratory laparotomy    2. Ligation of left ureter    3. Removal of left JJ ureteral stent         4/21 NSG orders                          4/21 OP note   x Other Bun/cr= 60/ 3.4   GFR= 16.2    ESCHERICHIA COLI          4/21 Lab  4/20 Urine culture     Encephalopathy- is a general term for any diffuse disease of the brain that alters brain function or structure. Treatment of the cognitive dysfunction varies but is ultimately dependent on the treatment of the underlying condition.    Major Symptoms of Encephalopathy - Decreased level of consciousness, fluctuating alertness/concentration, confusion, agitation, lethargy, somnolence, drowsiness, obtundation, stupor, or coma.         References: National Institutes of Healths (NIH) National [place] of Neurological Disorders and Strokes;  HCPro 2016; Advisory Board     Clinical Guidelines:   These guidelines will set system standards to assist providers in managing, documentation, and coding of encephalopathy. The intent of this document is to serve as a system guideline, not replace the providers clinical judgment:  Provider, please specify the diagnosis or diagnoses associated with above clinical findings.  [   ] Metabolic Encephalopathy - Due to electrolye imbalance, metabolic derangements, or infections processes, includes Septic Encephalopathy   [   ] Other Encephalopathy - Includes uremic encephalopathy   [   ] Unspecified Encephalopathy      [   ] Other Neurological Condition-  Includes Post-ictal altered mental status. (please specify condition): _________   [   ]  Please check with primary intensivist service for this query.     Please document in your progress notes daily for the duration of treatment until resolved, and include in your discharge summary.

## 2019-04-23 NOTE — SUBJECTIVE & OBJECTIVE
Subjective: no AE. AFVSS        Past Medical History:   Diagnosis Date    Anticoagulant long-term use     Asthma     Back pain     CAD (coronary artery disease)     s/p stentimg  (2), (1)    Carotid artery stenosis     Diabetes mellitus type 2 in obese     HTN (hypertension), benign     Hyperlipidemia     Keloid cicatrix     NPDR (nonproliferative diabetic retinopathy) 2015    NSTEMI (non-ST elevated myocardial infarction)     Nuclear sclerosis - Right Eye 3/18/2014    Primary localized osteoarthrosis, lower leg 2014    Sleep apnea      Past Surgical History:   Procedure Laterality Date    CARDIAC CATHETERIZATION      cataract extraction left eye      cataracts      CATHETERIZATION, HEART, LEFT Left 2014    Performed by Wilman Kim MD at [facility] CATH LAB     SECTION, LOW TRANSVERSE      CORONARY ANGIOPLASTY      Creation, Nephrostomy, Percutaneous Left 2019    Performed by Karina Surgeon at [facility] KARINA    ESOPHAGOGASTRODUODENOSCOPY (EGD) N/A 2016    Performed by Gardenia Adamson MD at [facility] ENDO (4TH FLR)    EXCISION TURBINATE, SUBMUCOUS      FUSION, SPINE, LUMBAR, ANTERIOR APPROACH Left L5-S1 Anterior to Psoa Interbody Fusion, L5-S1 Posterior Instrumentation Left 2019    Performed by Mk George MD at [facility] OR 2ND FLR    HAND SURGERY Left     HAND SURGERY Right     torn ligament repair/ Dr. Yeboah    HYSTERECTOMY      Injection,steroid,epidural,transforaminal approach - Bilateral - S1 Bilateral 2018    Performed by Tl Abreu MD at [facility] PAIN MGT    Injection-steroid-epidural-caudal N/A 2019    Performed by Dave Bentley Jr., MD at [facility] PAIN MGT    INSERTION-INTRAOCULAR LENS (IOL) Right 2015    Performed by Good Domingo MD at [facility] OR 1ST FLR    LAPAROTOMY, EXPLORATORY; LIGATION OF LEFT URETER; DOUBLE J STENT REMOVAL LEFT URETER Left 2019    Performed by Paul Carlson MD at [facility] OR 2ND FLR     left foot surgery      left wrist surgery      NASAL SEPTUM SURGERY  5/7/15    PHACOEMULSIFICATION-ASPIRATION-CATARACT Right 9/1/2015    Performed by Good Domingo MD at [facility] OR 1ST FLR    REPAIR, URETER  4/12/2019    Performed by Mk George MD at [facility] OR 2ND FLR    RESECTION-TURBINATES (SMR) N/A 5/7/2015    Performed by Dileep Dubois III, MD at [facility] OR 2ND FLR    rt elbow surgery      S/P LAD COATED STENT  05/14/2010    6 total     S/P OM1 STENT  08/2003    SEPTOPLASTY N/A 5/7/2015    Performed by Dileep Dubois III, MD at [facility] OR 2ND FLR    SINUS SURGERY      F.E.S.S.    SINUS SURGERY FUNCTIONAL ENDOSCOPIC WITH NAVIGATION WITH MAXILLARIES, ETHMOIDS, LEFT SPHENOID, LEFT LOLY N/A 5/7/2015    Performed by Dileep Dubois III, MD at [facility] OR 2ND FLR    STENT, URETERAL placement  4/12/2019    Performed by Robin Boyd MD at [facility] OR 2ND FLR    TUBAL LIGATION       Family History     Problem Relation (Age of Onset)    Diabetes Mother, Father, Sister, Brother, Sister    Heart attack Father    Heart disease Mother    Leukemia Father    No Known Problems Sister, Brother, Brother, Maternal Grandmother, Maternal Grandfather, Paternal Grandmother, Paternal Grandfather, Son, Son, Maternal Aunt, Maternal Uncle, Paternal Aunt, Paternal Uncle        Tobacco Use    Smoking status: Never Smoker    Smokeless tobacco: Never Used   Substance and Sexual Activity    Alcohol use: No     Alcohol/week: 0.0 oz    Drug use: No    Sexual activity: Yes     Partners: Male     Birth control/protection: Post-menopausal     Comment:        Objective:     Weight: 72.6 kg (160 lb)  Body mass index is 27.46 kg/m².  Vital Signs (Most Recent):  Temp: 98.8 °F (37.1 °C) (04/23/19 1100)  Pulse: 82 (04/23/19 1135)  Resp: (!) 4 (04/23/19 0731)  BP: (!) 106/58 (04/23/19 1135)  SpO2: 100 % (04/23/19 1135) Vital Signs (24h Range):  Temp:  [97.9 °F (36.6 °C)-99.8 °F (37.7 °C)] 98.8 °F (37.1 °C)  Pulse:  [69-82]  82  Resp:  [4-27] 4  SpO2:  [95 %-100 %] 100 %  BP: ()/() 106/58  Arterial Line BP: ()/(43-59) 78/55     Date 04/23/19 0700 - 04/24/19 0659   Shift 5604-5216 4982-0596 1560-9785 24 Hour Total   INTAKE   Shift Total(mL/kg)       OUTPUT   Urine(mL/kg/hr) 620   620   Drains 36   36   Shift Total(mL/kg) 656(9)   656(9)   Weight (kg) 72.6 72.6 72.6 72.6              Vent Mode: A/C  Oxygen Concentration (%):  [40-98] 40  Resp Rate Total:  [15 br/min-37 br/min] 24 br/min  Vt Set:  [450 mL] 450 mL  PEEP/CPAP:  [5 cmH20] 5 cmH20  Pressure Support:  [10 cmH20] 10 cmH20  Mean Airway Pressure:  [8.6 cmH20-15 cmH20] 14 cmH20         Closed/Suction Drain 04/12/19 2115 Left Other (Comment) Bulb 10 Fr. (Active)            Urethral Catheter 04/12/19 1650 Non-latex;Straight-tip (Active)            Urethral Catheter 04/20/19 1711 Latex 16 Fr. (Active)   Output (mL) 900 mL 4/20/2019  5:00 PM       Physical Exam:  Nursing note and vitals reviewed.    Constitutional: She appears well-developed and well-nourished.     Eyes: Pupils are equal, round, and reactive to light.     Abdominal: Soft.     Neurological:   Intubated, sedated- propofol.     PERRL. +corneal/cough/gag.   withdrawal to stimulation.      Significant Labs:  Recent Labs   Lab 04/21/19  1918 04/22/19  0423 04/22/19  1701 04/23/19  0400   * 141* 102 99    141 144 146*   K 4.6 4.2 3.4* 3.9   * 109 112* 115*   CO2 11* 16* 21* 21*   BUN 44* 44* 43* 43*   CREATININE 1.9* 1.9* 1.9* 1.9*   CALCIUM 7.1* 7.9* 8.0* 8.2*   MG 1.7 1.9  --  1.8     Recent Labs   Lab 04/22/19  0423 04/22/19  1207 04/23/19  0400   WBC 9.62 9.73 10.59   HGB 8.2* 8.2* 8.0*   HCT 25.2* 24.1* 24.5*   PLT 80* 86* 101*     Recent Labs   Lab 04/22/19  0814 04/23/19  0400   INR 2.1* 1.2     Microbiology Results (last 7 days)     Procedure Component Value Units Date/Time    Aerobic culture [[id number]] Collected:  04/21/19 0125    Order Status:  Completed Specimen:  Body Fluid  from Abdomen Updated:  04/23/19 0919     Aerobic Bacterial Culture Further report to follow    Narrative:       Retroperitoneal fluid    Blood culture x two cultures. Draw prior to antibiotics. [[id number]] Collected:  04/20/19 1711    Order Status:  Completed Specimen:  Blood from Peripheral, Antecubital, Right Updated:  04/23/19 0910     Blood Culture, Routine Gram stain aer bottle: Gram negative rods     Blood Culture, Routine Gram stain flaco bottle: Gram negative rods      Blood Culture, Routine Results called to and read back by: Pancho Mars RN  04/21/2019  03:18     Blood Culture, Routine --     ESCHERICHIA COLI  For susceptibility see order #[id number]      Narrative:       Aerobic and anaerobic    Blood culture x two cultures. Draw prior to antibiotics. [[id number]]  (Susceptibility) Collected:  04/20/19 1705    Order Status:  Completed Specimen:  Blood from Peripheral, Antecubital, Right Updated:  04/23/19 0910     Blood Culture, Routine Gram stain aer bottle: Gram negative rods     Blood Culture, Routine Gram stain flaco bottle: Gram negative rods      Blood Culture, Routine Positive results previously called 04/21/2019  06:36     Blood Culture, Routine ESCHERICHIA COLI    Narrative:       Aerobic and anaerobic    Blood culture [[id number]] Collected:  04/21/19 1835    Order Status:  Completed Specimen:  Blood from Peripheral, Hand, Left Updated:  04/23/19 0757     Blood Culture, Routine Gram stain aer bottle: Gram negative rods     Blood Culture, Routine Results called to and read back by: Carmenza Wallace RN 04/22/2019  10:14     Blood Culture, Routine --     GRAM NEGATIVE CHELITA  Identification pending  For susceptibility see order #[id number]      Urine culture [[id number]] Collected:  04/21/19 2256    Order Status:  Completed Specimen:  Urine Updated:  04/23/19 0648     Urine Culture, Routine No significant growth    Narrative:       Preferred Collection Type->Urine, Clean Catch  YELLOW AND GREY RECEIVED    Blood  culture [[id number]] Collected:  04/23/19 0500    Order Status:  Sent Specimen:  Blood from Peripheral, Antecubital, Right Updated:  04/23/19 0536    Blood culture [[id number]] Collected:  04/23/19 0430    Order Status:  Sent Specimen:  Blood from Peripheral, Ankle, Right Updated:  04/23/19 0507    Urine culture [[id number]]  (Susceptibility) Collected:  04/20/19 1711    Order Status:  Completed Specimen:  Urine Updated:  04/22/19 1935     Urine Culture, Routine --     ESCHERICHIA COLI  >100,000 cfu/ml      Narrative:       Preferred Collection Type->Urine, Clean Catch    Urine culture [[id number]] Collected:  04/21/19 0640    Order Status:  Completed Specimen:  Kidney, left Updated:  04/22/19 1205     Urine Culture, Routine No growth    Narrative:       Urine sample from left nephostomy  WAS TOLD BY [employer] TECH TO RE-ORDER AS URINE CULTURE 04/21/2019    06:52      Clostridium difficile EIA [[id number]] Collected:  04/22/19 0812    Order Status:  Completed Specimen:  Stool Updated:  04/22/19 1138     C. diff Antigen Negative     C difficile Toxins A+B, EIA Negative     Comment: Testing not recommended for children <24 months old.       Culture, Anaerobe [[id number]] Collected:  04/21/19 0125    Order Status:  Completed Specimen:  Body Fluid from Abdomen Updated:  04/22/19 0726     Anaerobic Culture Culture in progress    Narrative:       Retroperitoneal fluid    Blood culture [[id number]]     Order Status:  Canceled Specimen:  Blood     Gram stain [[id number]] Collected:  04/20/19 1711    Order Status:  Completed Specimen:  Urine from Catheterized Updated:  04/21/19 1328     Gram Stain Result Rare WBC's      Few Gram negative rods    Narrative:       add on GRAM #[id number] per Dr. Higuera @  04/21/2019  11:07     Gram stain [[id number]]     Order Status:  Completed Specimen:  Urine, Catheterized     Culture, Body Fluid - Bactec [[id number]] Collected:  04/21/19 0641    Order Status:  Canceled Specimen:  Body Fluid from  Bile Updated:  04/21/19 0643    Culture, Body Fluid - Bactec [[id number]] Collected:  04/21/19 0641    Order Status:  Canceled Specimen:  Body Fluid from Bile Updated:  04/21/19 0643    Culture, Anaerobe [[id number]] Collected:  04/21/19 0640    Order Status:  Canceled Specimen:  Body Fluid from Back Updated:  04/21/19 0642    Aerobic culture [[id number]] Collected:  04/21/19 0640    Order Status:  Canceled Specimen:  Kidney, left from Back Updated:  04/21/19 0642    Influenza A & B by Molecular [[id number]] Collected:  04/20/19 1713    Order Status:  Completed Specimen:  Nasopharyngeal Swab Updated:  04/20/19 1804     Influenza A, Molecular Negative     Influenza B, Molecular Negative     Flu A & B Source Nasal swab        CRP:   No results for input(s): CRP in the last 48 hours.  ESR: No results for input(s): POCESR, ERYTHROCYTES in the last 48 hours.    Significant Diagnostics:  I have reviewed all pertinent imaging results/findings within the past 24 hours.

## 2019-04-23 NOTE — ASSESSMENT & PLAN NOTE
ASSESSMENT/PLAN:   Mariann Huff is a 59 y.o. female s/p left ureteral injury on 4/12, who presented with fever, AMS, and intraabdominal abscess, taken for washout and ligation of left ureter with nephrostomy tube placement on 4/21.     Neuro:   - Sedated with propofol. Hold sedation for SBT this AM.   - Pain control with fentanyl    Pulmonary:   - Intubated  - Daily CXR   - ABGs prn   - Plan for SBT this AM    Recent Labs     04/22/19  0915   PH 7.312*   PCO2 31.5*   PO2 36*   HCO3 16.0*   POCSATURATED 64*   BE -10       Vent Mode: Spont  Oxygen Concentration (%):  [40-51] 40  Resp Rate Total:  [20 br/min-37 br/min] 20 br/min  Vt Set:  [450 mL] 450 mL  PEEP/CPAP:  [5 cmH20] 5 cmH20  Pressure Support:  [10 cmH20] 10 cmH20  Mean Airway Pressure:  [8.6 cmH20-15 cmH20] 8.6 cmH20    Cardiac:   - Vaso gtt and Levo gtt titrating to MAP> 65  - TTE ordered yesterday, EF 55%    Renal:    - S/p transection of left ureter 4/12 and subsequent ligation and PCT nephrostomy tube placement with IR 4/21  - BUN 43 and Cr 1.9  remains elevated but stable,   - UOP Total 1250/ 24 hrs;  315 from PCT Nephrostomy; 935 from correa  - Monitor I/Os    Intake/Output Summary (Last 24 hours) at 4/23/2019 0643  Last data filed at 4/23/2019 0600  Gross per 24 hour   Intake 4584 ml   Output 3136 ml   Net 1448 ml     ID:   - Blood cultures + Gram negative rods  - UCx from 4/20 growing E. Coli; sensitivities are now back. Repeat Cx pending.   - ID consulted for assistance with abx therapy; Zosyn discontinued and started on Ertapenem.     Hem/Onc:   - Hgb 6 post-op; received 2 units pRBC at that time.  - H/H stable at this time; cont to monitor    Endocrine:  - DM Type 2 at baseline    - Pt has been off insulin gtt overnight; discontinued and started on LDSSI for NPO    Fluids/Electrolytes/Nutrition/GI:   # Shock Liver   - Labs show improvement   - Elevated LFTs now down trending; may be 2/2 to shock liver from hypotension    - Thrombocytopenia;  plts up to 101 from 80 yesterday; cont to monitor   - INR elevated to 2.1 yesterday, now improved to 1.2    # Lactic Acidosis   - Now resolved; LA 1.7 this AM    # Diarrhea   - Pt with multiple episodes of loose stool from rectal tube; 1250 ml watery stool overnight   - C.Diff panel Negative    - Replace electrolytes PRN  - NPO      PPx:  - DVT: Plan to restart DVT ppx today       DISPO:  - Continue SICU care  - Plan SBT today

## 2019-04-23 NOTE — PLAN OF CARE
Problem: Spiritual Distress Risk or Actual  Goal: Spiritual Wellbeing    Intervention: Promote Spiritual Wellbeing  F - Mariella and Belief: Worship    I - Importance: Pt's son requested prayer support.     C - Community: Pt's family involved in pt's care.     A - Address in Care: Introduced and offered pastoral support with reflective listening and prayer upon request. Pt asleep. Pt's son bedside. No further spiritual needs requested. Family made aware of 's presence as needed.

## 2019-04-23 NOTE — PROGRESS NOTES
[facility]  Nephrology  Progress Note    Patient Name: Mariann Huff  MRN: [id number]  Admission Date: 4/20/2019  Hospital Length of Stay: 3 days  Attending Provider: Paul Carlson MD   Primary Care Physician: Jasbir Haney MD  Principal Problem:Ureteral transection of left native kidney    Subjective:     Interval History:   NAEON.  Kidney function remains stable with sCr of 1.9, UOP of 1.2L/24h, net positive 1.6L.  Reported by nursing that her BMS was draining clear/yellow drainage resembling urine.    Review of patient's allergies indicates:  No Known Allergies  Current Facility-Administered Medications   Medication Frequency    albuterol-ipratropium 2.5 mg-0.5 mg/3 mL nebulizer solution 3 mL Q4H PRN    calcium gluconate 1g in dextrose 5% 100mL (ready to mix system) PRN    calcium gluconate 1g in dextrose 5% 100mL (ready to mix system) PRN    calcium gluconate 1g in dextrose 5% 100mL (ready to mix system) PRN    cefTRIAXone (ROCEPHIN) 2 g in dextrose 5 % 50 mL IVPB Q24H    chlorhexidine 0.12 % solution 15 mL BID    dextrose 50% injection 12.5 g PRN    fentaNYL injection 50 mcg Q15 Min PRN    Followed by    [START ON 4/24/2019] fentaNYL injection 50 mcg Q1H PRN    glucagon (human recombinant) injection 1 mg PRN    heparin (porcine) injection 5,000 Units Q8H    insulin aspart U-100 pen 0-5 Units Q6H PRN    lactated ringers infusion Continuous    magnesium sulfate 2g in water 50mL IVPB (premix) PRN    magnesium sulfate 2g in water 50mL IVPB (premix) PRN    nitroGLYCERIN SL tablet 0.3 mg Q5 Min PRN    norepinephrine 4 mg in dextrose 5% 250 mL infusion (premix) (titrating) Continuous    pantoprazole injection 40 mg Daily    potassium chloride 40 mEq in 100 mL IVPB (FOR CENTRAL LINE ADMINISTRATION ONLY) PRN    And    potassium chloride 20 mEq in 100 mL IVPB (FOR CENTRAL LINE ADMINISTRATION ONLY) PRN    And    potassium chloride 40 mEq in 100 mL IVPB (FOR CENTRAL LINE  ADMINISTRATION ONLY) PRN    propofol (DIPRIVAN) 10 mg/mL infusion Continuous    sodium chloride 0.9% flush 10 mL PRN    vasopressin (PITRESSIN) 0.2 Units/mL in dextrose 5 % 100 mL infusion Continuous     Facility-Administered Medications Ordered in Other Encounters   Medication Frequency    lidocaine (PF) 10 mg/ml (1%) injection 10 mg Once       Objective:     Vital Signs (Most Recent):  Temp: 98.8 °F (37.1 °C) (04/23/19 1100)  Pulse: 82 (04/23/19 1135)  Resp: (!) 4 (04/23/19 0731)  BP: (!) 106/58 (04/23/19 1135)  SpO2: 100 % (04/23/19 1135)  O2 Device (Oxygen Therapy): ventilator (04/23/19 1135) Vital Signs (24h Range):  Temp:  [97.9 °F (36.6 °C)-99.8 °F (37.7 °C)] 98.8 °F (37.1 °C)  Pulse:  [69-82] 82  Resp:  [4-27] 4  SpO2:  [95 %-100 %] 100 %  BP: ()/() 106/58  Arterial Line BP: ()/(43-59) 78/55     Weight: 72.6 kg (160 lb) (04/22/19 1015)  Body mass index is 27.46 kg/m².  Body surface area is 1.81 meters squared.    I/O last 3 completed shifts:  In: 9916 [I.V.:6296; NG/GT:120; IV Piggyback:3500]  Out: 4486 [Urine:2310; Drains:726; Stool:1450]    Physical Exam   Constitutional: She appears well-developed and well-nourished. No distress. She is intubated.   HENT:   Head: Atraumatic.   Neck: No JVD present.   Cardiovascular: Normal rate and regular rhythm.   Pulmonary/Chest: Effort normal. She is intubated. She has wheezes (expiratory). She has no rales.   Abdominal: Soft. She exhibits no distension.   Musculoskeletal: She exhibits edema (non-pitting). She exhibits no tenderness.   Neurological: She is alert.   Skin: Skin is warm. She is not diaphoretic.       Significant Labs:  CBC:   Recent Labs   Lab 04/23/19  0400   WBC 10.59   RBC 2.84*   HGB 8.0*   HCT 24.5*   *   MCV 86   MCH 28.2   MCHC 32.7     CMP:   Recent Labs   Lab 04/23/19  0400   GLU 99   CALCIUM 8.2*   ALBUMIN 1.8*   PROT 5.2*   *   K 3.9   CO2 21*   *   BUN 43*   CREATININE 1.9*   ALKPHOS 133   *    *   BILITOT 5.8*            Assessment/Plan:     PAPA (acute kidney injury)  PAPA, at time of consult non-oliguric, baseline normal renal function  PAPA likely 2/2 ischemic ATN from hemodynamic instability/septic shock.    Repeat renal US w/o signs of hydronephrosis.  Nephrostomy tube/correa draining clear/sanford urine.    BMS draining yellow liquid stool resembling urine.    Plan/Recommendations:  -recommend discontinuing maintenance IVF  -will order creatinine of her stool, ?enteral fistula?  -continue strict I/O's  -continue trending labs.  Currently no need for RRT at present.          Chriss Ziegler, SANTOSH  Nephrology  [facility]

## 2019-04-23 NOTE — PROGRESS NOTES
Placed patient on PS 10 peep 5, 40%. Patient did not tolerate at all, volumes dropped to 150 and less, rate jumped to 39. Placed back on previous settings. Will try again later.

## 2019-04-23 NOTE — SUBJECTIVE & OBJECTIVE
"Interval HPI:   Overnight events:   NAEON. BG remains within goal without need for SQ insulin regimen. However, patient has been started on TF.     Eating:   NPO  Nausea: No  Hypoglycemia and intervention: No  Fever: No  TPN and/or TF: TF  If yes, type of TF/TPN and rate: 10ml/hr.     BP (!) 106/58   Pulse 82   Temp 98.8 °F (37.1 °C) (Oral)   Resp (!) 4   Ht 5' 4" (1.626 m)   Wt 72.6 kg (160 lb)   SpO2 100%   Breastfeeding? No   BMI 27.46 kg/m²     Labs Reviewed and Include    Recent Labs   Lab 04/23/19  0400   GLU 99   CALCIUM 8.2*   ALBUMIN 1.8*   PROT 5.2*   *   K 3.9   CO2 21*   *   BUN 43*   CREATININE 1.9*   ALKPHOS 133   *   *   BILITOT 5.8*     Lab Results   Component Value Date    WBC 10.59 04/23/2019    HGB 8.0 (L) 04/23/2019    HCT 24.5 (L) 04/23/2019    MCV 86 04/23/2019     (L) 04/23/2019     No results for input(s): TSH, FREET4 in the last 168 hours.  Lab Results   Component Value Date    HGBA1C 10.8 (H) 02/19/2019       Nutritional status:   Body mass index is 27.46 kg/m².  Lab Results   Component Value Date    ALBUMIN 1.8 (L) 04/23/2019    ALBUMIN 2.1 (L) 04/22/2019    ALBUMIN 1.9 (L) 04/22/2019     No results found for: PREALBUMIN    Estimated Creatinine Clearance: 31.5 mL/min (A) (based on SCr of 1.9 mg/dL (H)).    Accu-Checks  Recent Labs     04/22/19  1614 04/22/19  1704 04/22/19  1805 04/22/19  1834 04/22/19 2010 04/22/19  2203 04/22/19  2353 04/23/19  0354 04/23/19  0802 04/23/19  1205   POCTGLUCOSE 99 108 96 92 101 95 110 109 101 111*       Current Medications and/or Treatments Impacting Glycemic Control  Immunotherapy:    Immunosuppressants     None        Steroids:   Hormones (From admission, onward)    Start     Stop Route Frequency Ordered    04/21/19 0861  vasopressin (PITRESSIN) 0.2 Units/mL in dextrose 5 % 100 mL infusion      -- IV Continuous 04/21/19 0736        Pressors:    Autonomic Drugs (From admission, onward)    Start     Stop Route " Frequency Ordered    04/21/19 0745  norepinephrine 4 mg in dextrose 5% 250 mL infusion (premix) (titrating)     Question Answer Comment   Titrate by: (in mcg/kg/min) 0.02    Titrate interval: (in minutes) 5    Titrate to maintain: (MAP or SBP) MAP    Greater than: (in mmHg) 65    Maximum dose: (in mcg/kg/min) 3        -- IV Continuous 04/21/19 0736        Hyperglycemia/Diabetes Medications:   Antihyperglycemics (From admission, onward)    Start     Stop Route Frequency Ordered    04/23/19 0747  insulin aspart U-100 pen 0-5 Units      -- SubQ Every 6 hours PRN 04/23/19 0652

## 2019-04-23 NOTE — SUBJECTIVE & OBJECTIVE
Interval History:   Repeat blood cultures pending  Blood, urine cultures +E.coli  Surgical cultures pending      Review of Systems   Unable to perform ROS: Intubated     Objective:     Vital Signs (Most Recent):  Temp: 98.8 °F (37.1 °C) (04/23/19 1100)  Pulse: 82 (04/23/19 1135)  Resp: (!) 4 (04/23/19 0731)  BP: (!) 106/58 (04/23/19 1135)  SpO2: 100 % (04/23/19 1135) Vital Signs (24h Range):  Temp:  [97.9 °F (36.6 °C)-99.8 °F (37.7 °C)] 98.8 °F (37.1 °C)  Pulse:  [69-82] 82  Resp:  [4-27] 4  SpO2:  [95 %-100 %] 100 %  BP: ()/() 106/58  Arterial Line BP: ()/(43-59) 78/55     Weight: 72.6 kg (160 lb)  Body mass index is 27.46 kg/m².    Estimated Creatinine Clearance: 31.5 mL/min (A) (based on SCr of 1.9 mg/dL (H)).    Physical Exam   Constitutional: She appears well-developed and well-nourished. No distress.       intubated   HENT:   Head: Normocephalic.   Cardiovascular: Normal rate, regular rhythm and normal heart sounds.   No murmur heard.  Pulmonary/Chest: Effort normal. No stridor. No respiratory distress.   Abdominal: Soft. She exhibits no distension.   Rectal tube with watery stool output  Nephrostomy drain in place   Musculoskeletal: She exhibits no edema or deformity.   Neurological:   arousal with verbal command   Skin: Skin is warm and dry. She is not diaphoretic. No erythema. No pallor.   Psychiatric: She has a normal mood and affect.   Vitals reviewed.      Significant Labs:   Blood Culture:   Recent Labs   Lab 04/20/19  1705 04/20/19  1711 04/21/19  1835   LABBLOO Gram stain aer bottle: Gram negative rods  Gram stain flaco bottle: Gram negative rods   Positive results previously called 04/21/2019  06:36  ESCHERICHIA COLI Gram stain aer bottle: Gram negative rods  Gram stain flaco bottle: Gram negative rods   Results called to and read back by: Pancho Mars RN  04/21/2019  03:18  ESCHERICHIA COLI  For susceptibility see order #[id number]   Gram stain aer bottle: Gram negative rods   Results called to and read back by: Carmenza Wallace RN 04/22/2019  10:14  GRAM NEGATIVE CHELITA  Identification pending  For susceptibility see order #[id number]       CBC:   Recent Labs   Lab 04/22/19  0423 04/22/19  1207 04/23/19  0400   WBC 9.62 9.73 10.59   HGB 8.2* 8.2* 8.0*   HCT 25.2* 24.1* 24.5*   PLT 80* 86* 101*     CMP:   Recent Labs   Lab 04/21/19  1918 04/22/19  0423 04/22/19  1701 04/23/19  0400    141 144 146*   K 4.6 4.2 3.4* 3.9   * 109 112* 115*   CO2 11* 16* 21* 21*   * 141* 102 99   BUN 44* 44* 43* 43*   CREATININE 1.9* 1.9* 1.9* 1.9*   CALCIUM 7.1* 7.9* 8.0* 8.2*   PROT 4.3* 5.1*  --  5.2*   ALBUMIN 1.4* 1.9* 2.1* 1.8*   BILITOT 4.7* 5.2*  --  5.8*   ALKPHOS 102 103  --  133   AST 1,697* 1,553*  --  734*   * 572*  --  517*   ANIONGAP 16 16 11 10   EGFRNONAA 28.7* 28.7* 28.7* 28.7*     Urine Culture:   Recent Labs   Lab 04/20/19  1711 04/21/19  0640 04/21/19  2256   LABURIN ESCHERICHIA COLI  >100,000 cfu/ml   No growth No significant growth     Urine Studies:   Recent Labs   Lab 04/21/19  2256   COLORU Argelia   APPEARANCEUA Cloudy*   PHUR 5.0   SPECGRAV 1.020   PROTEINUA 1+*   GLUCUA 3+*   KETONESU Negative   BILIRUBINUA Negative   OCCULTUA 3+*   NITRITE Negative   LEUKOCYTESUR 1+*   RBCUA 14*   WBCUA 51*   BACTERIA Moderate*   SQUAMEPITHEL 3   HYALINECASTS 3*     Wound Culture:   Recent Labs   Lab 04/21/19  0125   LABAERO Further report to follow     All pertinent labs within the past 24 hours have been reviewed.    Significant Imaging: I have reviewed all pertinent imaging results/findings within the past 24 hours.

## 2019-04-23 NOTE — SUBJECTIVE & OBJECTIVE
Interval History:   Off pressors. Lactate down to 1.7 this AM. Urine E. Coli with sensitivities resulted. Moves all extremities with sedation vacation. Tachypnea on spontaneous this AM.    Review of Systems    Objective:     Temp:  [98.6 °F (37 °C)-99.8 °F (37.7 °C)] 98.6 °F (37 °C)  Pulse:  [69-81] 79  Resp:  [14-37] 20  SpO2:  [83 %-100 %] 97 %  BP: ()/() 126/58  Arterial Line BP: ()/(43-75) 78/55     Body mass index is 27.46 kg/m².           Drains     Drain                 Closed/Suction Drain 04/21/19 0300 LLQ 1 day         NG/OG Tube 04/21/19 0728 Center mouth 1 day         Nephrostomy 04/21/19 0629 Left 8 Fr. 1 day         Urethral Catheter 04/20/19 1711 Latex 16 Fr. 1 day         Rectal Tube 04/21/19 2100 rectal tube w/ balloon (indicate number of mLs) less than 1 day                Physical Exam   Constitutional: She appears well-developed and well-nourished. She is sedated and intubated.   HENT:   Head: Normocephalic and atraumatic.   Neck: No JVD present.   Cardiovascular: Normal rate and regular rhythm.    Pulmonary/Chest: She is intubated.   mechanically ventilated   Abdominal: Soft. She exhibits distension. There is no rebound and no guarding.   Dressing c/d/i  MADHURI drain with minimal SS output   Genitourinary:   Genitourinary Comments: Fontenot draining clear yellow  Left neph tube with clear pink urine   Neurological:   Sedated   Skin: She is not diaphoretic. No pallor.       Significant Labs:    BMP:  Recent Labs   Lab 04/22/19  0423 04/22/19  1701 04/23/19  0400    144 146*   K 4.2 3.4* 3.9    112* 115*   CO2 16* 21* 21*   BUN 44* 43* 43*   CREATININE 1.9* 1.9* 1.9*   CALCIUM 7.9* 8.0* 8.2*       CBC:   Recent Labs   Lab 04/22/19  0423 04/22/19  1207 04/23/19  0400   WBC 9.62 9.73 10.59   HGB 8.2* 8.2* 8.0*   HCT 25.2* 24.1* 24.5*   PLT 80* 86* 101*       All pertinent labs results from the past 24 hours have been reviewed.    Significant Imaging:  All pertinent imaging  results/findings from the past 24 hours have been reviewed.

## 2019-04-23 NOTE — ASSESSMENT & PLAN NOTE
60 y/o female with HTN, DMII, CAD, NSTEMI, s/p L5-S1 OLIF with NSGY 4/12 c/b intraoperative left ureteral injury and underwent ureteroureteral anastomoses and ureteral stent placement. She was discharged with a correa and MADHURI drain that was removed on 4/16. She was seen by urology and anticipated stent removal in 2-3 months (6/2019).  She presents to ED with AMS and sepsis found to have air and fluid collection of left anterior prevertebral soft tissue with left ureter involvement. She underwent ex-lap and washout on 4/21. She is found to have E.coli bacteremia. We are consulted for abx recommendations.      Per op note,  There were pocket of purulence noted and evacuated, sent for culture of left retroperitoneum to pole of left kidney. The stent was visible. Posterior to the stent there was a pocket of purulent fluid and exposed hardware along the spinal vertebrae. The tissue along the distal and proximal end of ureter were friable and unhealthy. She underwent left nephrostomy tube placement. The stent was removed and several metal clips were placed on proximal end of the ureteral.     1. Discontinue ertapenem, started ceftriaxone.   2. Repeat blood cultures today. Repeat daily until clear   3. Surgical cultures pending. Will follow  4. Contact isolation status.   5. anticipate long term IV abx   6. Discussed with ID staff. ID will follow with you

## 2019-04-23 NOTE — SUBJECTIVE & OBJECTIVE
Interval History/Significant Events: NAEON. Pt has been weaned from pressors. Watery output from rectal tube and OG tube.  She remains intubated.  Plan for SBT this morning.      Follow-up For: Procedure(s) (LRB):  Creation, Nephrostomy, Percutaneous (Left)    Post-Operative Day: 2 Days Post-Op    Objective:     Vital Signs (Most Recent):  Temp: 98.6 °F (37 °C) (04/23/19 0300)  Pulse: 79 (04/23/19 0600)  Resp: 20 (04/23/19 0600)  BP: (!) 126/58 (04/23/19 0600)  SpO2: 97 % (04/23/19 0600) Vital Signs (24h Range):  Temp:  [98.6 °F (37 °C)-99.8 °F (37.7 °C)] 98.6 °F (37 °C)  Pulse:  [69-81] 79  Resp:  [14-37] 20  SpO2:  [83 %-100 %] 97 %  BP: ()/() 126/58  Arterial Line BP: ()/(43-75) 78/55     Weight: 72.6 kg (160 lb)  Body mass index is 27.46 kg/m².      Intake/Output Summary (Last 24 hours) at 4/23/2019 0630  Last data filed at 4/23/2019 0600  Gross per 24 hour   Intake 4584 ml   Output 3136 ml   Net 1448 ml       Physical Exam   Constitutional: She appears well-developed and well-nourished.   HENT:   Head: Normocephalic and atraumatic.   Intubated   Eyes: Right eye exhibits no discharge. Left eye exhibits no discharge.   Neck: Normal range of motion. Neck supple.   Cardiovascular: Normal rate and regular rhythm.   Pulmonary/Chest: Effort normal. No respiratory distress.   Intubated   Abdominal:   Midline incision c/d/i.  MADHURI drainage SS.  Abdomen soft, non-distended. Rectal tube with watery output. OG tube with watery output.   Genitourinary:   Genitourinary Comments: Nephrostomy tube in place with blood tinged output.     Musculoskeletal: Normal range of motion.   Neurological:   Sedated    Skin: Skin is warm and dry.   Vitals reviewed.      Vents:  Vent Mode: Spont (04/23/19 0555)  Set Rate: 20 bmp (04/23/19 0555)  Vt Set: 450 mL (04/23/19 0555)  Pressure Support: 10 cmH20 (04/23/19 0555)  PEEP/CPAP: 5 cmH20 (04/23/19 0555)  Oxygen Concentration (%): 40 (04/23/19 0600)  Peak Airway Pressure: 15  cmH2O (04/23/19 0555)  Plateau Pressure: 35 cmH20 (04/23/19 0555)  Total Ve: 7.31 mL (04/23/19 0555)  F/VT Ratio<105 (RSBI): 119.05 (04/23/19 0555)    Lines/Drains/Airways     Central Venous Catheter Line                 Percutaneous Central Line Insertion/Assessment - triple lumen  04/21/19 0100 right internal jugular 2 days          Drain                 Closed/Suction Drain 04/21/19 0300 LLQ 2 days         Nephrostomy 04/21/19 0629 Left 8 Fr. 2 days         Urethral Catheter 04/20/19 1711 Latex 16 Fr. 2 days         NG/OG Tube 04/21/19 0728 Center mouth 1 day         Rectal Tube 04/21/19 2100 rectal tube w/ balloon (indicate number of mLs) 1 day          Airway                 Airway - Non-Surgical 04/21/19 0029 Endotracheal Tube 2 days          Arterial Line                 Arterial Line 04/21/19 0031 Left Radial 2 days          Peripheral Intravenous Line                 Peripheral IV - Single Lumen 04/20/19 1650 18 G Left Antecubital 2 days         Peripheral IV - Single Lumen 04/20/19 1710 18 G Right Antecubital 2 days                Significant Labs:    CBC/Anemia Profile:  Recent Labs   Lab 04/22/19  0423 04/22/19  1207 04/23/19  0400   WBC 9.62 9.73 10.59   HGB 8.2* 8.2* 8.0*   HCT 25.2* 24.1* 24.5*   PLT 80* 86* 101*   MCV 85 85 86   RDW 13.9 13.9 14.3        Chemistries:  Recent Labs   Lab 04/21/19  1918 04/22/19  0423 04/22/19  1701 04/23/19  0400    141 144 146*   K 4.6 4.2 3.4* 3.9   * 109 112* 115*   CO2 11* 16* 21* 21*   BUN 44* 44* 43* 43*   CREATININE 1.9* 1.9* 1.9* 1.9*   CALCIUM 7.1* 7.9* 8.0* 8.2*   ALBUMIN 1.4* 1.9* 2.1* 1.8*   PROT 4.3* 5.1*  --  5.2*   BILITOT 4.7* 5.2*  --  5.8*   ALKPHOS 102 103  --  133   * 572*  --  517*   AST 1,697* 1,553*  --  734*   MG 1.7 1.9  --  1.8   PHOS 4.4 4.4 3.0 2.2*       ABGs:   Recent Labs   Lab 04/22/19  0915   PH 7.312*   PCO2 31.5*   HCO3 16.0*   POCSATURATED 64*   BE -10     Blood Culture:   Recent Labs   Lab 04/21/19  1835   LABFRANTZOO  Gram stain aer bottle: Gram negative rods  Results called to and read back by: Carmenza Wallace RN 04/22/2019  10:14     Respiratory Culture: No results for input(s): GSRESP, RESPIRATORYC in the last 48 hours.  Urine Culture:   Recent Labs   Lab 04/21/19  0640   LABURIN No growth     Urine Studies:   Recent Labs   Lab 04/21/19  2256   COLORU Argelia   APPEARANCEUA Cloudy*   PHUR 5.0   SPECGRAV 1.020   PROTEINUA 1+*   GLUCUA 3+*   KETONESU Negative   BILIRUBINUA Negative   OCCULTUA 3+*   NITRITE Negative   LEUKOCYTESUR 1+*   RBCUA 14*   WBCUA 51*   BACTERIA Moderate*   SQUAMEPITHEL 3   HYALINECASTS 3*     All pertinent labs within the past 24 hours have been reviewed.    Significant Imaging:  I have reviewed and interpreted all pertinent imaging results/findings within the past 24 hours.

## 2019-04-23 NOTE — PLAN OF CARE
Problem: Adult Inpatient Plan of Care  Goal: Plan of Care Review  Outcome: Ongoing (interventions implemented as appropriate)  Pt off propofol from 5:30-11, SBT done, pt remains somnolent and apneic. Propofol restarted. On AC 40%, 5 of PEEP, O2 sats 100%. Afebrile, VSS. UO >40 mL/hr. BMS and MADHURI drain output liquid yellow, resembling urine, creatinine lab sent for BMS output and MADHURI drain output, awaiting results. 300 mL from BMS, ~600 mLs from urostomy, ~50 mLs from MADHURI drain throughout shift. Pt and family updated on plan of care throughout shift. See flow sheet for assessment and details.

## 2019-04-23 NOTE — CARE UPDATE
-1 80    BG is within goal without need for insulin therapy. Patient placed on SQ insulin correction overnight per primary care team.      Patient has dx of DM, and is on large dose insulin MDI regimen. Will continue to follow patient through extubation and advance in diet.     Continue to follow primary care team POC     Low Dose SQ Insulin Correction Scale.    BG monitoring every 6 hours.     ** Please call Endocrine for any BG related issues **  ** Please notify Endocrine for any change and/or advance in diet**      Discharge Recommendations:     TBD

## 2019-04-23 NOTE — PROGRESS NOTES
[facility]  Neurosurgery  Progress Note    Subjective:     History of Present Illness: Ms Huff is a 59F with history of HTN, DM, CAD, NSTEMI, and recent L5-S1 OLIF on  with intraoperative L ureteral injury repaired by urology with stent that presents to ED with AMS and fevers, concerning for sepsis.  states that he noticed this morning that she was beginning to show signs of altered mental status and developed fevers. She has been ambulating around the house with a walker, no recent changes in numbness/weakness. Back and lateral incisions are clean/dry/intact.          Post-Op Info:  Procedure(s) (LRB):  Creation, Nephrostomy, Percutaneous (Left)   2 Days Post-Op     Subjective: no AE. AFVSS        Past Medical History:   Diagnosis Date    Anticoagulant long-term use     Asthma     Back pain     CAD (coronary artery disease)     s/p stentimg  (2), (1)    Carotid artery stenosis     Diabetes mellitus type 2 in obese     HTN (hypertension), benign     Hyperlipidemia     Keloid cicatrix     NPDR (nonproliferative diabetic retinopathy) 2015    NSTEMI (non-ST elevated myocardial infarction)     Nuclear sclerosis - Right Eye 3/18/2014    Primary localized osteoarthrosis, lower leg 2014    Sleep apnea      Past Surgical History:   Procedure Laterality Date    CARDIAC CATHETERIZATION      cataract extraction left eye      cataracts      CATHETERIZATION, HEART, LEFT Left 2014    Performed by Wilman Kim MD at [facility] CATH LAB     SECTION, LOW TRANSVERSE      CORONARY ANGIOPLASTY      Creation, Nephrostomy, Percutaneous Left 2019    Performed by Jason Surgeon at [facility] JASON    ESOPHAGOGASTRODUODENOSCOPY (EGD) N/A 2016    Performed by Gardenia Adamson MD at [facility] ENDO (4TH FLR)    EXCISION TURBINATE, SUBMUCOUS      FUSION, SPINE, LUMBAR, ANTERIOR APPROACH Left L5-S1 Anterior to Psoa Interbody Fusion, L5-S1 Posterior  Instrumentation Left 4/12/2019    Performed by Mk George MD at [facility] OR 2ND FLR    HAND SURGERY Left     HAND SURGERY Right     torn ligament repair/ Dr. Yeboah    HYSTERECTOMY      Injection,steroid,epidural,transforaminal approach - Bilateral - S1 Bilateral 9/25/2018    Performed by Tl Abreu MD at [facility] PAIN MGT    Injection-steroid-epidural-caudal N/A 2/7/2019    Performed by Dave Bentley Jr., MD at [facility] PAIN MGT    INSERTION-INTRAOCULAR LENS (IOL) Right 9/1/2015    Performed by Good Domingo MD at [facility] OR 1ST FLR    LAPAROTOMY, EXPLORATORY; LIGATION OF LEFT URETER; DOUBLE J STENT REMOVAL LEFT URETER Left 4/20/2019    Performed by Paul Carlson MD at [facility] OR [address] [place] [address]    left foot surgery      left wrist surgery      NASAL SEPTUM SURGERY  5/7/15    PHACOEMULSIFICATION-ASPIRATION-CATARACT Right 9/1/2015    Performed by Good Domingo MD at [facility] OR [facility] FLR    REPAIR, URETER  4/12/2019    Performed by Mk George MD at [facility] OR [address] [place] [address]    RESECTION-TURBINATES (SMR) N/A 5/7/2015    Performed by Dileep Dubois III, MD at [facility] OR [address] [place] [address]    rt elbow surgery      S/P LAD COATED STENT  05/14/2010    6 total     S/P OM1 STENT  08/2003    SEPTOPLASTY N/A 5/7/2015    Performed by Dileep Dubois III, MD at [facility] OR [address] [place] [address]    SINUS SURGERY      F.E.S.S.    SINUS SURGERY FUNCTIONAL ENDOSCOPIC WITH NAVIGATION WITH MAXILLARIES, ETHMOIDS, LEFT SPHENOID, LEFT LOLY N/A 5/7/2015    Performed by Dileep Dubois III, MD at [facility] OR [address] [place] [address]    STENT, URETERAL placement  4/12/2019    Performed by Robin Byod MD at [facility] OR [address] [place] [address]    TUBAL LIGATION       Family History     Problem Relation (Age of Onset)    Diabetes Mother, Father, Sister, Brother, Sister    Heart attack Father    Heart disease Mother    Leukemia Father    No Known Problems Sister, Brother, Brother, Maternal Grandmother, Maternal Grandfather, Paternal Grandmother, Paternal Grandfather, Son, Son,  Maternal Aunt, Maternal Uncle, Paternal Aunt, Paternal Uncle        Tobacco Use    Smoking status: Never Smoker    Smokeless tobacco: Never Used   Substance and Sexual Activity    Alcohol use: No     Alcohol/week: 0.0 oz    Drug use: No    Sexual activity: Yes     Partners: Male     Birth control/protection: Post-menopausal     Comment:        Objective:     Weight: 72.6 kg (160 lb)  Body mass index is 27.46 kg/m².  Vital Signs (Most Recent):  Temp: 98.8 °F (37.1 °C) (04/23/19 1100)  Pulse: 82 (04/23/19 1135)  Resp: (!) 4 (04/23/19 0731)  BP: (!) 106/58 (04/23/19 1135)  SpO2: 100 % (04/23/19 1135) Vital Signs (24h Range):  Temp:  [97.9 °F (36.6 °C)-99.8 °F (37.7 °C)] 98.8 °F (37.1 °C)  Pulse:  [69-82] 82  Resp:  [4-27] 4  SpO2:  [95 %-100 %] 100 %  BP: ()/() 106/58  Arterial Line BP: ()/(43-59) 78/55     Date 04/23/19 0700 - 04/24/19 0659   Shift 5268-3591 1066-9423 3286-7064 24 Hour Total   INTAKE   Shift Total(mL/kg)       OUTPUT   Urine(mL/kg/hr) 620   620   Drains 36   36   Shift Total(mL/kg) 656(9)   656(9)   Weight (kg) 72.6 72.6 72.6 72.6              Vent Mode: A/C  Oxygen Concentration (%):  [40-98] 40  Resp Rate Total:  [15 br/min-37 br/min] 24 br/min  Vt Set:  [450 mL] 450 mL  PEEP/CPAP:  [5 cmH20] 5 cmH20  Pressure Support:  [10 cmH20] 10 cmH20  Mean Airway Pressure:  [8.6 cmH20-15 cmH20] 14 cmH20         Closed/Suction Drain 04/12/19 2115 Left Other (Comment) Bulb 10 Fr. (Active)            Urethral Catheter 04/12/19 1650 Non-latex;Straight-tip (Active)            Urethral Catheter 04/20/19 1711 Latex 16 Fr. (Active)   Output (mL) 900 mL 4/20/2019  5:00 PM       Physical Exam:  Nursing note and vitals reviewed.    Constitutional: She appears well-developed and well-nourished.     Eyes: Pupils are equal, round, and reactive to light.     Abdominal: Soft.     Neurological:   Intubated, sedated- propofol.     PERRL. +corneal/cough/gag.   withdrawal to stimulation.       Significant Labs:  Recent Labs   Lab 04/21/19  1918 04/22/19  0423 04/22/19  1701 04/23/19  0400   * 141* 102 99    141 144 146*   K 4.6 4.2 3.4* 3.9   * 109 112* 115*   CO2 11* 16* 21* 21*   BUN 44* 44* 43* 43*   CREATININE 1.9* 1.9* 1.9* 1.9*   CALCIUM 7.1* 7.9* 8.0* 8.2*   MG 1.7 1.9  --  1.8     Recent Labs   Lab 04/22/19  0423 04/22/19  1207 04/23/19  0400   WBC 9.62 9.73 10.59   HGB 8.2* 8.2* 8.0*   HCT 25.2* 24.1* 24.5*   PLT 80* 86* 101*     Recent Labs   Lab 04/22/19  0814 04/23/19  0400   INR 2.1* 1.2     Microbiology Results (last 7 days)     Procedure Component Value Units Date/Time    Aerobic culture [[id number]] Collected:  04/21/19 0125    Order Status:  Completed Specimen:  Body Fluid from Abdomen Updated:  04/23/19 0919     Aerobic Bacterial Culture Further report to follow    Narrative:       Retroperitoneal fluid    Blood culture x two cultures. Draw prior to antibiotics. [[id number]] Collected:  04/20/19 1711    Order Status:  Completed Specimen:  Blood from Peripheral, Antecubital, Right Updated:  04/23/19 0910     Blood Culture, Routine Gram stain aer bottle: Gram negative rods     Blood Culture, Routine Gram stain flaco bottle: Gram negative rods      Blood Culture, Routine Results called to and read back by: Pancho Mars RN  04/21/2019  03:18     Blood Culture, Routine --     ESCHERICHIA COLI  For susceptibility see order #[id number]      Narrative:       Aerobic and anaerobic    Blood culture x two cultures. Draw prior to antibiotics. [[id number]]  (Susceptibility) Collected:  04/20/19 1705    Order Status:  Completed Specimen:  Blood from Peripheral, Antecubital, Right Updated:  04/23/19 0910     Blood Culture, Routine Gram stain aer bottle: Gram negative rods     Blood Culture, Routine Gram stain flaco bottle: Gram negative rods      Blood Culture, Routine Positive results previously called 04/21/2019  06:36     Blood Culture, Routine ESCHERICHIA COLI    Narrative:        Aerobic and anaerobic    Blood culture [[id number]] Collected:  04/21/19 1835    Order Status:  Completed Specimen:  Blood from Peripheral, Hand, Left Updated:  04/23/19 0757     Blood Culture, Routine Gram stain aer bottle: Gram negative rods     Blood Culture, Routine Results called to and read back by: Carmenza Wallace RN 04/22/2019  10:14     Blood Culture, Routine --     GRAM NEGATIVE CHELITA  Identification pending  For susceptibility see order #[id number]      Urine culture [[id number]] Collected:  04/21/19 2256    Order Status:  Completed Specimen:  Urine Updated:  04/23/19 0648     Urine Culture, Routine No significant growth    Narrative:       Preferred Collection Type->Urine, Clean Catch  YELLOW AND GREY RECEIVED    Blood culture [[id number]] Collected:  04/23/19 0500    Order Status:  Sent Specimen:  Blood from Peripheral, Antecubital, Right Updated:  04/23/19 0536    Blood culture [[id number]] Collected:  04/23/19 0430    Order Status:  Sent Specimen:  Blood from Peripheral, Ankle, Right Updated:  04/23/19 0507    Urine culture [[id number]]  (Susceptibility) Collected:  04/20/19 1711    Order Status:  Completed Specimen:  Urine Updated:  04/22/19 1935     Urine Culture, Routine --     ESCHERICHIA COLI  >100,000 cfu/ml      Narrative:       Preferred Collection Type->Urine, Clean Catch    Urine culture [[id number]] Collected:  04/21/19 0640    Order Status:  Completed Specimen:  Kidney, left Updated:  04/22/19 1205     Urine Culture, Routine No growth    Narrative:       Urine sample from left nephostomy  WAS TOLD BY [employer] TECH TO RE-ORDER AS URINE CULTURE 04/21/2019    06:52      Clostridium difficile EIA [[id number]] Collected:  04/22/19 0812    Order Status:  Completed Specimen:  Stool Updated:  04/22/19 1138     C. diff Antigen Negative     C difficile Toxins A+B, EIA Negative     Comment: Testing not recommended for children <24 months old.       Culture, Anaerobe [[id number]] Collected:  04/21/19 0125     Order Status:  Completed Specimen:  Body Fluid from Abdomen Updated:  04/22/19 0726     Anaerobic Culture Culture in progress    Narrative:       Retroperitoneal fluid    Blood culture [[id number]]     Order Status:  Canceled Specimen:  Blood     Gram stain [[id number]] Collected:  04/20/19 1711    Order Status:  Completed Specimen:  Urine from Catheterized Updated:  04/21/19 1328     Gram Stain Result Rare WBC's      Few Gram negative rods    Narrative:       add on GRAM #[id number] per Dr. Higuera @  04/21/2019  11:07     Gram stain [[id number]]     Order Status:  Completed Specimen:  Urine, Catheterized     Culture, Body Fluid - Bactec [[id number]] Collected:  04/21/19 0641    Order Status:  Canceled Specimen:  Body Fluid from Bile Updated:  04/21/19 0643    Culture, Body Fluid - Bactec [[id number]] Collected:  04/21/19 0641    Order Status:  Canceled Specimen:  Body Fluid from Bile Updated:  04/21/19 0643    Culture, Anaerobe [[id number]] Collected:  04/21/19 0640    Order Status:  Canceled Specimen:  Body Fluid from Back Updated:  04/21/19 0642    Aerobic culture [[id number]] Collected:  04/21/19 0640    Order Status:  Canceled Specimen:  Kidney, left from Back Updated:  04/21/19 0642    Influenza A & B by Molecular [[id number]] Collected:  04/20/19 1713    Order Status:  Completed Specimen:  Nasopharyngeal Swab Updated:  04/20/19 1804     Influenza A, Molecular Negative     Influenza B, Molecular Negative     Flu A & B Source Nasal swab        CRP:   No results for input(s): CRP in the last 48 hours.  ESR: No results for input(s): POCESR, ERYTHROCYTES in the last 48 hours.    Significant Diagnostics:  I have reviewed all pertinent imaging results/findings within the past 24 hours.        Assessment/Plan:     Sepsis  59F with recent L5-S1 OLIF with intraoperative ureteral injury, repaired by urology intraoperatively, with stent placement that presents with AMS, fevers, concern for sepsis. Urinalysis with innumerable WBCs  and bacteria     Neuro stable.  Wean sedation for neuro checks.   CV- goal normotension.    Pulm- intubated, WTE when safe.   FENGI- goal eunatremia.   Heme/ID- recommendation for sepsis treatment. Abx changed to ertapenem per ID  Renal- f/u urology recommendations    Dispo- will follow closely.         Francisco Malagon MD  Neurosurgery  [facility]

## 2019-04-23 NOTE — PROGRESS NOTES
[facility]  Urology  Progress Note    Patient Name: Mariann Huff  MRN: [id number]  Admission Date: 4/20/2019  Hospital Length of Stay: 3 days  Code Status: Full Code   Attending Provider: Paul Carlson MD   Primary Care Physician: Jasbir Haney MD    Subjective:     HPI:  Mariann Huff is a 59 y.o. female with history of HTN, type 2 diabetes mellitus, CAD, NSTEMI, and back pain who presents to [facility] with altered mental status and sepsis. She underwent L5-S1 OLIF with NSGY on 4/12 and had intraoperative left ureteral injury with ureteroureteral anastomosis and ureteral stent placement. She did well initially and had correa and MADHURI drain removed on 4/16. She began having chills and altered mental status 2 days ago and this has progressively worsened. No complaints of pain.     She is altered and HPI is limited. In the ED she is febrile to 103 and tachycardic and pressures are low normal. WBC is 5, creatinine is 3.6 baseline 1.0, lactate is 4.6. Cath UA concerning for infection, 3+ LE, >100 WBCs, and many bacteria on microscopy.    CT and MRI abdomen both show air with minimal fluid near the surgical site with left ureter coursing through. There is air throughout the proximal collecting system which is decompressed with JJ ureteral stent in good position.    Taken for ex lap, ligation of left ureter and left neph tube placement on 4/21/19.    Interval History:   Off pressors. Lactate down to 1.7 this AM. Urine E. Coli with sensitivities resulted. Moves all extremities with sedation vacation. Tachypnea on spontaneous this AM.    Review of Systems    Objective:     Temp:  [98.6 °F (37 °C)-99.8 °F (37.7 °C)] 98.6 °F (37 °C)  Pulse:  [69-81] 79  Resp:  [14-37] 20  SpO2:  [83 %-100 %] 97 %  BP: ()/() 126/58  Arterial Line BP: ()/(43-75) 78/55     Body mass index is 27.46 kg/m².           Drains     Drain                 Closed/Suction Drain 04/21/19 0300 LLQ 1 day         NG/OG Tube  04/21/19 0728 Center mouth 1 day         Nephrostomy 04/21/19 0629 Left 8 Fr. 1 day         Urethral Catheter 04/20/19 1711 Latex 16 Fr. 1 day         Rectal Tube 04/21/19 2100 rectal tube w/ balloon (indicate number of mLs) less than 1 day                Physical Exam   Constitutional: She appears well-developed and well-nourished. She is sedated and intubated.   HENT:   Head: Normocephalic and atraumatic.   Neck: No JVD present.   Cardiovascular: Normal rate and regular rhythm.    Pulmonary/Chest: She is intubated.   mechanically ventilated   Abdominal: Soft. She exhibits distension. There is no rebound and no guarding.   Dressing c/d/i  MADHURI drain with minimal SS output   Genitourinary:   Genitourinary Comments: Correa draining clear yellow  Left neph tube with clear pink urine   Neurological:   Sedated   Skin: She is not diaphoretic. No pallor.       Significant Labs:    BMP:  Recent Labs   Lab 04/22/19  0423 04/22/19  1701 04/23/19  0400    144 146*   K 4.2 3.4* 3.9    112* 115*   CO2 16* 21* 21*   BUN 44* 43* 43*   CREATININE 1.9* 1.9* 1.9*   CALCIUM 7.9* 8.0* 8.2*       CBC:   Recent Labs   Lab 04/22/19  0423 04/22/19  1207 04/23/19  0400   WBC 9.62 9.73 10.59   HGB 8.2* 8.2* 8.0*   HCT 25.2* 24.1* 24.5*   PLT 80* 86* 101*       All pertinent labs results from the past 24 hours have been reviewed.    Significant Imaging:  All pertinent imaging results/findings from the past 24 hours have been reviewed.      Assessment/Plan:     * Ureteral transection of left native kidney  Mariann Huff is a 59 y.o. female s/p left ureteral injury on 4/12, presented with fever, AMS, and intraabdominal abscess, taken for washout and ligation of left ureter with neph tube placement on 4/21    - Management per SICU, appreciate assistance   - Vent per SICU, SBT today and wean to extubate  - Urine cultures with E. Coli, likely transition to Rocephin today, blood cultures GNRs likely same species  - Maintain correa and  neph tube at this time  - maintain MADHURI drain      Sepsis  As above        VTE Risk Mitigation (From admission, onward)        Ordered     Place sequential compression device  Until discontinued      04/20/19 2108     IP VTE HIGH RISK PATIENT  Once      04/20/19 2108          Hugh Higuera MD  Urology  [facility]

## 2019-04-23 NOTE — ASSESSMENT & PLAN NOTE
PAPA, at time of consult non-oliguric, baseline normal renal function  PAPA likely 2/2 ischemic ATN from hemodynamic instability/septic shock.    Repeat renal US w/o signs of hydronephrosis.  Nephrostomy tube/correa draining clear/sanford urine.    BMS draining yellow liquid stool resembling urine.    Plan/Recommendations:  -recommend discontinuing maintenance IVF  -will order creatinine of her stool, ?enteral fistula?  -continue strict I/O's  -continue trending labs.  Currently no need for RRT at present.

## 2019-04-23 NOTE — SUBJECTIVE & OBJECTIVE
Interval History:   SINDHUEON.  Kidney function remains stable with sCr of 1.9, UOP of 1.2L/24h, net positive 1.6L.  Reported by nursing that her BMS was draining clear/yellow drainage resembling urine.    Review of patient's allergies indicates:  No Known Allergies  Current Facility-Administered Medications   Medication Frequency    albuterol-ipratropium 2.5 mg-0.5 mg/3 mL nebulizer solution 3 mL Q4H PRN    calcium gluconate 1g in dextrose 5% 100mL (ready to mix system) PRN    calcium gluconate 1g in dextrose 5% 100mL (ready to mix system) PRN    calcium gluconate 1g in dextrose 5% 100mL (ready to mix system) PRN    cefTRIAXone (ROCEPHIN) 2 g in dextrose 5 % 50 mL IVPB Q24H    chlorhexidine 0.12 % solution 15 mL BID    dextrose 50% injection 12.5 g PRN    fentaNYL injection 50 mcg Q15 Min PRN    Followed by    [START ON 4/24/2019] fentaNYL injection 50 mcg Q1H PRN    glucagon (human recombinant) injection 1 mg PRN    heparin (porcine) injection 5,000 Units Q8H    insulin aspart U-100 pen 0-5 Units Q6H PRN    lactated ringers infusion Continuous    magnesium sulfate 2g in water 50mL IVPB (premix) PRN    magnesium sulfate 2g in water 50mL IVPB (premix) PRN    nitroGLYCERIN SL tablet 0.3 mg Q5 Min PRN    norepinephrine 4 mg in dextrose 5% 250 mL infusion (premix) (titrating) Continuous    pantoprazole injection 40 mg Daily    potassium chloride 40 mEq in 100 mL IVPB (FOR CENTRAL LINE ADMINISTRATION ONLY) PRN    And    potassium chloride 20 mEq in 100 mL IVPB (FOR CENTRAL LINE ADMINISTRATION ONLY) PRN    And    potassium chloride 40 mEq in 100 mL IVPB (FOR CENTRAL LINE ADMINISTRATION ONLY) PRN    propofol (DIPRIVAN) 10 mg/mL infusion Continuous    sodium chloride 0.9% flush 10 mL PRN    vasopressin (PITRESSIN) 0.2 Units/mL in dextrose 5 % 100 mL infusion Continuous     Facility-Administered Medications Ordered in Other Encounters   Medication Frequency    lidocaine (PF) 10 mg/ml (1%) injection 10  mg Once       Objective:     Vital Signs (Most Recent):  Temp: 98.8 °F (37.1 °C) (04/23/19 1100)  Pulse: 82 (04/23/19 1135)  Resp: (!) 4 (04/23/19 0731)  BP: (!) 106/58 (04/23/19 1135)  SpO2: 100 % (04/23/19 1135)  O2 Device (Oxygen Therapy): ventilator (04/23/19 1135) Vital Signs (24h Range):  Temp:  [97.9 °F (36.6 °C)-99.8 °F (37.7 °C)] 98.8 °F (37.1 °C)  Pulse:  [69-82] 82  Resp:  [4-27] 4  SpO2:  [95 %-100 %] 100 %  BP: ()/() 106/58  Arterial Line BP: ()/(43-59) 78/55     Weight: 72.6 kg (160 lb) (04/22/19 1015)  Body mass index is 27.46 kg/m².  Body surface area is 1.81 meters squared.    I/O last 3 completed shifts:  In: 9916 [I.V.:6296; NG/GT:120; IV Piggyback:3500]  Out: 4486 [Urine:2310; Drains:726; Stool:1450]    Physical Exam   Constitutional: She appears well-developed and well-nourished. No distress. She is intubated.   HENT:   Head: Atraumatic.   Neck: No JVD present.   Cardiovascular: Normal rate and regular rhythm.   Pulmonary/Chest: Effort normal. She is intubated. She has wheezes (expiratory). She has no rales.   Abdominal: Soft. She exhibits no distension.   Musculoskeletal: She exhibits edema (non-pitting). She exhibits no tenderness.   Neurological: She is alert.   Skin: Skin is warm. She is not diaphoretic.       Significant Labs:  CBC:   Recent Labs   Lab 04/23/19  0400   WBC 10.59   RBC 2.84*   HGB 8.0*   HCT 24.5*   *   MCV 86   MCH 28.2   MCHC 32.7     CMP:   Recent Labs   Lab 04/23/19  0400   GLU 99   CALCIUM 8.2*   ALBUMIN 1.8*   PROT 5.2*   *   K 3.9   CO2 21*   *   BUN 43*   CREATININE 1.9*   ALKPHOS 133   *   *   BILITOT 5.8*

## 2019-04-23 NOTE — PLAN OF CARE
Problem: Adult Inpatient Plan of Care  Goal: Plan of Care Review  Outcome: Ongoing (interventions implemented as appropriate)  Pt afebrile with VSS at this time. HR 79, /58, SpO2 100% on AC 40%/5 PEEP/rate 20/.  Following commands on sedation vacation, and moving all extremities.  Propofol gtt off at 0530 to prepare for SBT.  MIVF NS @ 200.  BMS output 1000cc bright yellow liquid, UOP 420cc, MADHURI output 60, and nephrostomy output  210cc for this shift.  Lactic 1.7.  Weight shift/turned Q2hrs.  Abd and poster incision CDI.  Breakdown noted around BMS and protective cream applied.  See flowsheet for further assessment/details.   at bedside, updated on current condition/plan of care, questions answered, and emotional support provided.   MD updated on current condition, vitals, labs, and gtts.  Orders received to call respiratory when patient is more awake to begin SBT.  Will continue to monitor.

## 2019-04-23 NOTE — ASSESSMENT & PLAN NOTE
Mariann TRISTIN Huff is a 59 y.o. female s/p left ureteral injury on 4/12, presented with fever, AMS, and intraabdominal abscess, taken for washout and ligation of left ureter with neph tube placement on 4/21    - Management per SICU, appreciate assistance   - Vent per SICU, SBT today and wean to extubate  - Urine cultures with E. Coli, likely transition to Rocephin today, blood cultures GNRs likely same species  - Maintain correa and neph tube at this time  - maintain MADHURI drain

## 2019-04-23 NOTE — PROGRESS NOTES
[facility]  Infectious Disease  Progress Note    Patient Name: Mariann Huff  MRN: [id number]  Admission Date: 4/20/2019  Length of Stay: 3 days  Attending Physician: Paul Carlson MD  Primary Care Provider: Jasbir Haney MD    Isolation Status: Contact  Assessment/Plan:      Sepsis  58 y/o female with HTN, DMII, CAD, NSTEMI, s/p L5-S1 OLIF with NSGY 4/12 c/b intraoperative left ureteral injury and underwent ureteroureteral anastomoses and ureteral stent placement. She was discharged with a correa and MADHURI drain that was removed on 4/16. She was seen by urology and anticipated stent removal in 2-3 months (6/2019).  She presents to ED with AMS and sepsis found to have air and fluid collection of left anterior prevertebral soft tissue with left ureter involvement. She underwent ex-lap and washout on 4/21. She is found to have E.coli bacteremia. We are consulted for abx recommendations.      Per op note,  There were pocket of purulence noted and evacuated, sent for culture of left retroperitoneum to pole of left kidney. The stent was visible. Posterior to the stent there was a pocket of purulent fluid and exposed hardware along the spinal vertebrae. The tissue along the distal and proximal end of ureter were friable and unhealthy. She underwent left nephrostomy tube placement. The stent was removed and several metal clips were placed on proximal end of the ureteral.     1. Discontinue ertapenem, started ceftriaxone.   2. Repeat blood cultures today. Repeat daily until clear   3. Surgical cultures pending. Will follow  4. Contact isolation status.   5. anticipate long term IV abx   6. Discussed with ID staff. ID will follow with you           Thank you for your consult. I will follow-up with patient. Please contact us if you have any additional questions.    August Mars PA-C  Infectious Disease  [facility]  [phone]    Subjective:     Principal Problem:Ureteral transection of  left native kidney    HPI: 58 y/o female with HTN, DMII, CAD, NSTEMI, s/p L5-S1 OLIF with NSGY 4/12 sustained intraoperative laceration and underwent ureteroureteral anastomoses and ureteral stent placement. She was discharged with a correa and MADHURI drain that was removed on 4/16. She was seen by urology and anticipated stent removal in 2-3 months (6/2019).  She presents to ED with AMS and sepsis. She was febrile 103 in ED. WBC 5K on admit. Lactate 4.6. Cath UA with 3+leukocytes, >100 WBcs and many bacteria.     MRI: Postsurgical change of L5-S1 posterior spinal fusion and anterior interbody fusion with a 4.4 cm fluid fluid collection in the left anterior prevertebral soft tissues at the level of the L5-S1 disc space.  Findings may represent postoperative seroma or evolving hematoma however, urinoma not excluded.  No definite abscess although correlation is advised.    Irregular flow void involving the right common iliac vein, underlying thrombus not excluded.      CT: air collection within the anterior paraspinous soft tissues through which the left ureter courses. Given that the ureter courses through this, communication of the ureter with this collection is not excluded.  There is a ureteral stent within the left ureter.Punctate foci of air in L5-S1.  2. Air within the left renal collecting system, along the left ureter, and within the urinary bladder, correlation advised.    This was not amendable for drainage by IR so she was taken to OR for washout.     She underwent ex-lap of left ureter and left nephrostomy tube placement 4/21/19.   Per op note,  There were pocket of purulence noted and evacuated, sent for culture of left retroperitoneum to pole of left kidney. The stent was visible. Posterior to the stent there was a pocket of purulent fluid and exposed hardware along the spinal vertebrae. The tissue along the distal and proximal end of ureter were friable and unhealthy. She underwent left nephrostomy tube  placement. The stent was removed and several metal clips were placed on proximal end of the ureteral. MADHURI drain was placed into left retroperitoneal.     Blood cultures here are positive for GNR. Repeat blood culturse are positive.   Urine cultures +E.coli  She is currently on zosyn. She is in the ICU, intubated, sedated  Interval History:   Repeat blood cultures pending  Blood, urine cultures +E.coli  Surgical cultures pending      Review of Systems   Unable to perform ROS: Intubated     Objective:     Vital Signs (Most Recent):  Temp: 98.8 °F (37.1 °C) (04/23/19 1100)  Pulse: 82 (04/23/19 1135)  Resp: (!) 4 (04/23/19 0731)  BP: (!) 106/58 (04/23/19 1135)  SpO2: 100 % (04/23/19 1135) Vital Signs (24h Range):  Temp:  [97.9 °F (36.6 °C)-99.8 °F (37.7 °C)] 98.8 °F (37.1 °C)  Pulse:  [69-82] 82  Resp:  [4-27] 4  SpO2:  [95 %-100 %] 100 %  BP: ()/() 106/58  Arterial Line BP: ()/(43-59) 78/55     Weight: 72.6 kg (160 lb)  Body mass index is 27.46 kg/m².    Estimated Creatinine Clearance: 31.5 mL/min (A) (based on SCr of 1.9 mg/dL (H)).    Physical Exam   Constitutional: She appears well-developed and well-nourished. No distress.       intubated   HENT:   Head: Normocephalic.   Cardiovascular: Normal rate, regular rhythm and normal heart sounds.   No murmur heard.  Pulmonary/Chest: Effort normal. No stridor. No respiratory distress.   Abdominal: Soft. She exhibits no distension.   Rectal tube with watery stool output  Nephrostomy drain in place   Musculoskeletal: She exhibits no edema or deformity.   Neurological:   arousal with verbal command   Skin: Skin is warm and dry. She is not diaphoretic. No erythema. No pallor.   Psychiatric: She has a normal mood and affect.   Vitals reviewed.      Significant Labs:   Blood Culture:   Recent Labs   Lab 04/20/19  1705 04/20/19  1711 04/21/19  1835   LABBLOO Gram stain aer bottle: Gram negative rods  Gram stain flaco bottle: Gram negative rods   Positive results  previously called 04/21/2019  06:36  ESCHERICHIA COLI Gram stain aer bottle: Gram negative rods  Gram stain flaco bottle: Gram negative rods   Results called to and read back by: Pancho Mars RN  04/21/2019  03:18  ESCHERICHIA COLI  For susceptibility see order #[id number]   Gram stain aer bottle: Gram negative rods  Results called to and read back by: Carmenza Wallace RN 04/22/2019  10:14  GRAM NEGATIVE CHELITA  Identification pending  For susceptibility see order #[id number]       CBC:   Recent Labs   Lab 04/22/19  0423 04/22/19  1207 04/23/19  0400   WBC 9.62 9.73 10.59   HGB 8.2* 8.2* 8.0*   HCT 25.2* 24.1* 24.5*   PLT 80* 86* 101*     CMP:   Recent Labs   Lab 04/21/19  1918 04/22/19  0423 04/22/19  1701 04/23/19  0400    141 144 146*   K 4.6 4.2 3.4* 3.9   * 109 112* 115*   CO2 11* 16* 21* 21*   * 141* 102 99   BUN 44* 44* 43* 43*   CREATININE 1.9* 1.9* 1.9* 1.9*   CALCIUM 7.1* 7.9* 8.0* 8.2*   PROT 4.3* 5.1*  --  5.2*   ALBUMIN 1.4* 1.9* 2.1* 1.8*   BILITOT 4.7* 5.2*  --  5.8*   ALKPHOS 102 103  --  133   AST 1,697* 1,553*  --  734*   * 572*  --  517*   ANIONGAP 16 16 11 10   EGFRNONAA 28.7* 28.7* 28.7* 28.7*     Urine Culture:   Recent Labs   Lab 04/20/19  1711 04/21/19  0640 04/21/19  2256   LABURIN ESCHERICHIA COLI  >100,000 cfu/ml   No growth No significant growth     Urine Studies:   Recent Labs   Lab 04/21/19 2256   COLORU Argelia   APPEARANCEUA Cloudy*   PHUR 5.0   SPECGRAV 1.020   PROTEINUA 1+*   GLUCUA 3+*   KETONESU Negative   BILIRUBINUA Negative   OCCULTUA 3+*   NITRITE Negative   LEUKOCYTESUR 1+*   RBCUA 14*   WBCUA 51*   BACTERIA Moderate*   SQUAMEPITHEL 3   HYALINECASTS 3*     Wound Culture:   Recent Labs   Lab 04/21/19  0125   LABAERO Further report to follow     All pertinent labs within the past 24 hours have been reviewed.    Significant Imaging: I have reviewed all pertinent imaging results/findings within the past 24 hours.

## 2019-04-24 PROBLEM — Z78.9 ON ENTERAL NUTRITION: Status: ACTIVE | Noted: 2019-04-24

## 2019-04-24 LAB
ALBUMIN SERPL BCP-MCNC: 1.6 G/DL (ref 3.5–5.2)
ALLENS TEST: ABNORMAL
ALP SERPL-CCNC: 161 U/L (ref 55–135)
ALT SERPL W/O P-5'-P-CCNC: 371 U/L (ref 10–44)
ANION GAP SERPL CALC-SCNC: 13 MMOL/L (ref 8–16)
ANISOCYTOSIS BLD QL SMEAR: SLIGHT
AST SERPL-CCNC: 309 U/L (ref 10–40)
BACTERIA #/AREA URNS AUTO: ABNORMAL /HPF
BACTERIA BLD CULT: NORMAL
BASOPHILS # BLD AUTO: ABNORMAL K/UL (ref 0–0.2)
BASOPHILS NFR BLD: 0 % (ref 0–1.9)
BILIRUB SERPL-MCNC: 6.7 MG/DL (ref 0.1–1)
BILIRUB UR QL STRIP: ABNORMAL
BUN SERPL-MCNC: 41 MG/DL (ref 6–20)
CALCIUM SERPL-MCNC: 8.5 MG/DL (ref 8.7–10.5)
CHLORIDE SERPL-SCNC: 117 MMOL/L (ref 95–110)
CLARITY UR REFRACT.AUTO: ABNORMAL
CO2 SERPL-SCNC: 18 MMOL/L (ref 23–29)
COLOR UR AUTO: YELLOW
CREAT SERPL-MCNC: 1.5 MG/DL (ref 0.5–1.4)
DELSYS: ABNORMAL
DELSYS: ABNORMAL
DIFFERENTIAL METHOD: ABNORMAL
EOSINOPHIL # BLD AUTO: ABNORMAL K/UL (ref 0–0.5)
EOSINOPHIL NFR BLD: 1 % (ref 0–8)
ERYTHROCYTE [DISTWIDTH] IN BLOOD BY AUTOMATED COUNT: 14.6 % (ref 11.5–14.5)
ERYTHROCYTE [SEDIMENTATION RATE] IN BLOOD BY WESTERGREN METHOD: 10 MM/H
ERYTHROCYTE [SEDIMENTATION RATE] IN BLOOD BY WESTERGREN METHOD: 20 MM/H
EST. GFR  (AFRICAN AMERICAN): 44 ML/MIN/1.73 M^2
EST. GFR  (NON AFRICAN AMERICAN): 38.1 ML/MIN/1.73 M^2
FIO2: 40
FIO2: 40
GLUCOSE SERPL-MCNC: 116 MG/DL (ref 70–110)
GLUCOSE UR QL STRIP: ABNORMAL
HCO3 UR-SCNC: 16.3 MMOL/L (ref 24–28)
HCO3 UR-SCNC: 16.8 MMOL/L (ref 24–28)
HCO3 UR-SCNC: 17.2 MMOL/L (ref 24–28)
HCT VFR BLD AUTO: 26.4 % (ref 37–48.5)
HCT VFR BLD CALC: 26 %PCV (ref 36–54)
HGB BLD-MCNC: 8.3 G/DL (ref 12–16)
HGB UR QL STRIP: ABNORMAL
HYALINE CASTS UR QL AUTO: 0 /LPF
HYPOCHROMIA BLD QL SMEAR: ABNORMAL
IMM GRANULOCYTES # BLD AUTO: ABNORMAL K/UL (ref 0–0.04)
IMM GRANULOCYTES NFR BLD AUTO: ABNORMAL % (ref 0–0.5)
INR PPP: 1 (ref 0.8–1.2)
KETONES UR QL STRIP: ABNORMAL
LEUKOCYTE ESTERASE UR QL STRIP: ABNORMAL
LYMPHOCYTES # BLD AUTO: ABNORMAL K/UL (ref 1–4.8)
LYMPHOCYTES NFR BLD: 10 % (ref 18–48)
MAGNESIUM SERPL-MCNC: 2.4 MG/DL (ref 1.6–2.6)
MCH RBC QN AUTO: 27.3 PG (ref 27–31)
MCHC RBC AUTO-ENTMCNC: 31.4 G/DL (ref 32–36)
MCV RBC AUTO: 87 FL (ref 82–98)
MICROSCOPIC COMMENT: ABNORMAL
MIN VOL: 7
MIN VOL: 8
MODE: ABNORMAL
MODE: ABNORMAL
MONOCYTES # BLD AUTO: ABNORMAL K/UL (ref 0.3–1)
MONOCYTES NFR BLD: 2 % (ref 4–15)
NEUTROPHILS NFR BLD: 87 % (ref 38–73)
NITRITE UR QL STRIP: NEGATIVE
NRBC BLD-RTO: 1 /100 WBC
OVALOCYTES BLD QL SMEAR: ABNORMAL
PCO2 BLDA: 24.9 MMHG (ref 35–45)
PCO2 BLDA: 31.3 MMHG (ref 35–45)
PCO2 BLDA: 33.7 MMHG (ref 35–45)
PEEP: 5
PEEP: 5
PH SMN: 7.32 [PH] (ref 7.35–7.45)
PH SMN: 7.34 [PH] (ref 7.35–7.45)
PH SMN: 7.42 [PH] (ref 7.35–7.45)
PH UR STRIP: 5 [PH] (ref 5–8)
PHOSPHATE SERPL-MCNC: 2.1 MG/DL (ref 2.7–4.5)
PIP: 30
PIP: 31
PLATELET # BLD AUTO: 104 K/UL (ref 150–350)
PLATELET BLD QL SMEAR: ABNORMAL
PMV BLD AUTO: 12.1 FL (ref 9.2–12.9)
PO2 BLDA: 104 MMHG (ref 80–100)
PO2 BLDA: 157 MMHG (ref 80–100)
PO2 BLDA: 81 MMHG (ref 80–100)
POC BE: -8 MMOL/L
POC BE: -9 MMOL/L
POC BE: -9 MMOL/L
POC IONIZED CALCIUM: 1.27 MMOL/L (ref 1.06–1.42)
POC SATURATED O2: 95 % (ref 95–100)
POC SATURATED O2: 98 % (ref 95–100)
POC SATURATED O2: 99 % (ref 95–100)
POC TCO2: 17 MMOL/L (ref 23–27)
POC TCO2: 18 MMOL/L (ref 23–27)
POC TCO2: 18 MMOL/L (ref 23–27)
POCT GLUCOSE: 160 MG/DL (ref 70–110)
POCT GLUCOSE: 177 MG/DL (ref 70–110)
POIKILOCYTOSIS BLD QL SMEAR: SLIGHT
POLYCHROMASIA BLD QL SMEAR: ABNORMAL
POTASSIUM BLD-SCNC: 4.1 MMOL/L (ref 3.5–5.1)
POTASSIUM SERPL-SCNC: 4 MMOL/L (ref 3.5–5.1)
PROT SERPL-MCNC: 5.3 G/DL (ref 6–8.4)
PROT UR QL STRIP: ABNORMAL
PROTHROMBIN TIME: 10.1 SEC (ref 9–12.5)
RBC # BLD AUTO: 3.04 M/UL (ref 4–5.4)
RBC #/AREA URNS AUTO: >100 /HPF (ref 0–4)
SAMPLE: ABNORMAL
SITE: ABNORMAL
SODIUM BLD-SCNC: 147 MMOL/L (ref 136–145)
SODIUM SERPL-SCNC: 148 MMOL/L (ref 136–145)
SP GR UR STRIP: 1.02 (ref 1–1.03)
SP02: 100
SP02: 96
SQUAMOUS #/AREA URNS AUTO: 3 /HPF
URN SPEC COLLECT METH UR: ABNORMAL
VT: 350
VT: 450
WBC # BLD AUTO: 12.49 K/UL (ref 3.9–12.7)
WBC #/AREA URNS AUTO: >100 /HPF (ref 0–5)
WBC CLUMPS UR QL AUTO: ABNORMAL
YEAST UR QL AUTO: ABNORMAL

## 2019-04-24 PROCEDURE — 94761 N-INVAS EAR/PLS OXIMETRY MLT: CPT

## 2019-04-24 PROCEDURE — S5010 5% DEXTROSE AND 0.45% SALINE: HCPCS | Performed by: STUDENT IN AN ORGANIZED HEALTH CARE EDUCATION/TRAINING PROGRAM

## 2019-04-24 PROCEDURE — 83605 ASSAY OF LACTIC ACID: CPT

## 2019-04-24 PROCEDURE — 85007 BL SMEAR W/DIFF WBC COUNT: CPT

## 2019-04-24 PROCEDURE — 99232 SBSQ HOSP IP/OBS MODERATE 35: CPT | Mod: ,,, | Performed by: NURSE PRACTITIONER

## 2019-04-24 PROCEDURE — 99233 PR SUBSEQUENT HOSPITAL CARE,LEVL III: ICD-10-PCS | Mod: ICN,,, | Performed by: PHYSICIAN ASSISTANT

## 2019-04-24 PROCEDURE — 99232 PR SUBSEQUENT HOSPITAL CARE,LEVL II: ICD-10-PCS | Mod: ,,, | Performed by: NURSE PRACTITIONER

## 2019-04-24 PROCEDURE — 25000003 PHARM REV CODE 250: Performed by: PHYSICIAN ASSISTANT

## 2019-04-24 PROCEDURE — 99231 SBSQ HOSP IP/OBS SF/LOW 25: CPT | Mod: ,,, | Performed by: NURSE PRACTITIONER

## 2019-04-24 PROCEDURE — 99291 CRITICAL CARE FIRST HOUR: CPT | Mod: GC,,, | Performed by: SURGERY

## 2019-04-24 PROCEDURE — 87040 BLOOD CULTURE FOR BACTERIA: CPT

## 2019-04-24 PROCEDURE — 25000003 PHARM REV CODE 250: Performed by: STUDENT IN AN ORGANIZED HEALTH CARE EDUCATION/TRAINING PROGRAM

## 2019-04-24 PROCEDURE — C9113 INJ PANTOPRAZOLE SODIUM, VIA: HCPCS | Performed by: ANESTHESIOLOGY

## 2019-04-24 PROCEDURE — 36600 WITHDRAWAL OF ARTERIAL BLOOD: CPT

## 2019-04-24 PROCEDURE — 82803 BLOOD GASES ANY COMBINATION: CPT

## 2019-04-24 PROCEDURE — 99233 SBSQ HOSP IP/OBS HIGH 50: CPT | Mod: ICN,,, | Performed by: PHYSICIAN ASSISTANT

## 2019-04-24 PROCEDURE — 85027 COMPLETE CBC AUTOMATED: CPT

## 2019-04-24 PROCEDURE — 63600175 PHARM REV CODE 636 W HCPCS: Performed by: STUDENT IN AN ORGANIZED HEALTH CARE EDUCATION/TRAINING PROGRAM

## 2019-04-24 PROCEDURE — 81001 URINALYSIS AUTO W/SCOPE: CPT

## 2019-04-24 PROCEDURE — 84100 ASSAY OF PHOSPHORUS: CPT

## 2019-04-24 PROCEDURE — 83735 ASSAY OF MAGNESIUM: CPT

## 2019-04-24 PROCEDURE — 20000000 HC ICU ROOM

## 2019-04-24 PROCEDURE — 99231 PR SUBSEQUENT HOSPITAL CARE,LEVL I: ICD-10-PCS | Mod: ,,, | Performed by: NURSE PRACTITIONER

## 2019-04-24 PROCEDURE — 99900035 HC TECH TIME PER 15 MIN (STAT)

## 2019-04-24 PROCEDURE — 84132 ASSAY OF SERUM POTASSIUM: CPT

## 2019-04-24 PROCEDURE — 99291 PR CRITICAL CARE, E/M 30-74 MINUTES: ICD-10-PCS | Mod: GC,,, | Performed by: SURGERY

## 2019-04-24 PROCEDURE — 84295 ASSAY OF SERUM SODIUM: CPT

## 2019-04-24 PROCEDURE — 63600175 PHARM REV CODE 636 W HCPCS: Performed by: ANESTHESIOLOGY

## 2019-04-24 PROCEDURE — 85014 HEMATOCRIT: CPT

## 2019-04-24 PROCEDURE — 82330 ASSAY OF CALCIUM: CPT

## 2019-04-24 PROCEDURE — 99900026 HC AIRWAY MAINTENANCE (STAT)

## 2019-04-24 PROCEDURE — 63600175 PHARM REV CODE 636 W HCPCS: Performed by: PHYSICIAN ASSISTANT

## 2019-04-24 PROCEDURE — 85610 PROTHROMBIN TIME: CPT

## 2019-04-24 PROCEDURE — 37799 UNLISTED PX VASCULAR SURGERY: CPT

## 2019-04-24 PROCEDURE — 27000221 HC OXYGEN, UP TO 24 HOURS

## 2019-04-24 PROCEDURE — 80053 COMPREHEN METABOLIC PANEL: CPT

## 2019-04-24 RX ORDER — HYDRALAZINE HYDROCHLORIDE 20 MG/ML
10 INJECTION INTRAMUSCULAR; INTRAVENOUS EVERY 6 HOURS PRN
Status: DISCONTINUED | OUTPATIENT
Start: 2019-04-24 | End: 2019-06-05 | Stop reason: HOSPADM

## 2019-04-24 RX ORDER — DEXMEDETOMIDINE HYDROCHLORIDE 4 UG/ML
0.2 INJECTION, SOLUTION INTRAVENOUS CONTINUOUS
Status: DISCONTINUED | OUTPATIENT
Start: 2019-04-24 | End: 2019-05-03

## 2019-04-24 RX ORDER — DEXTROSE MONOHYDRATE, SODIUM CHLORIDE, AND POTASSIUM CHLORIDE 50; 1.49; 4.5 G/1000ML; G/1000ML; G/1000ML
INJECTION, SOLUTION INTRAVENOUS CONTINUOUS
Status: DISCONTINUED | OUTPATIENT
Start: 2019-04-24 | End: 2019-04-24

## 2019-04-24 RX ORDER — DEXTROSE MONOHYDRATE AND SODIUM CHLORIDE 5; .45 G/100ML; G/100ML
INJECTION, SOLUTION INTRAVENOUS CONTINUOUS
Status: DISCONTINUED | OUTPATIENT
Start: 2019-04-24 | End: 2019-04-25

## 2019-04-24 RX ADMIN — CHLORHEXIDINE GLUCONATE 0.12% ORAL RINSE 15 ML: 1.2 LIQUID ORAL at 08:04

## 2019-04-24 RX ADMIN — CHLORHEXIDINE GLUCONATE 0.12% ORAL RINSE 15 ML: 1.2 LIQUID ORAL at 09:04

## 2019-04-24 RX ADMIN — HEPARIN SODIUM 5000 UNITS: 5000 INJECTION, SOLUTION INTRAVENOUS; SUBCUTANEOUS at 06:04

## 2019-04-24 RX ADMIN — CEFTRIAXONE 2 G: 2 INJECTION, SOLUTION INTRAVENOUS at 04:04

## 2019-04-24 RX ADMIN — HEPARIN SODIUM 5000 UNITS: 5000 INJECTION, SOLUTION INTRAVENOUS; SUBCUTANEOUS at 09:04

## 2019-04-24 RX ADMIN — FENTANYL CITRATE 50 MCG: 50 INJECTION INTRAMUSCULAR; INTRAVENOUS at 09:04

## 2019-04-24 RX ADMIN — RIFAMPIN 300 MG: 600 INJECTION, POWDER, LYOPHILIZED, FOR SOLUTION INTRAVENOUS at 02:04

## 2019-04-24 RX ADMIN — DEXMEDETOMIDINE HYDROCHLORIDE 0.2 MCG/KG/HR: 400 INJECTION INTRAVENOUS at 09:04

## 2019-04-24 RX ADMIN — DEXMEDETOMIDINE HYDROCHLORIDE 0.4 MCG/KG/HR: 400 INJECTION INTRAVENOUS at 06:04

## 2019-04-24 RX ADMIN — DEXTROSE AND SODIUM CHLORIDE: 5; .45 INJECTION, SOLUTION INTRAVENOUS at 11:04

## 2019-04-24 RX ADMIN — PANTOPRAZOLE SODIUM 40 MG: 40 INJECTION, POWDER, LYOPHILIZED, FOR SOLUTION INTRAVENOUS at 08:04

## 2019-04-24 RX ADMIN — HEPARIN SODIUM 5000 UNITS: 5000 INJECTION, SOLUTION INTRAVENOUS; SUBCUTANEOUS at 02:04

## 2019-04-24 NOTE — SUBJECTIVE & OBJECTIVE
Interval History:   Seen with  at bedside  Blood cultures repeated today  Blood cultures 4/23 NGTD  Blood cultures +4/20-4/21E.coli  Surgical cultures +Staph lugdensis   Afebrile. Stable.     Review of Systems   Unable to perform ROS: Intubated     Objective:     Vital Signs (Most Recent):  Temp: 99 °F (37.2 °C) (04/24/19 0700)  Pulse: 71 (04/24/19 1000)  Resp: (!) 4 (04/23/19 0731)  BP: 128/61 (04/24/19 1000)  SpO2: 98 % (04/24/19 1000) Vital Signs (24h Range):  Temp:  [98.8 °F (37.1 °C)-99.1 °F (37.3 °C)] 99 °F (37.2 °C)  Pulse:  [70-93] 71  SpO2:  [95 %-100 %] 98 %  BP: ()/(54-72) 128/61     Weight: 72.6 kg (160 lb)  Body mass index is 27.46 kg/m².    Estimated Creatinine Clearance: 39.9 mL/min (A) (based on SCr of 1.5 mg/dL (H)).    Physical Exam   Constitutional: She appears well-developed and well-nourished. No distress.       intubated   HENT:   Head: Normocephalic.   Cardiovascular: Normal rate, regular rhythm and normal heart sounds.   No murmur heard.  Pulmonary/Chest: Effort normal. No stridor. No respiratory distress.   Abdominal: Soft. She exhibits no distension.   Rectal tube with watery stool output  Nephrostomy drain in place   Musculoskeletal: She exhibits no edema or deformity.   Skin: Skin is warm and dry. She is not diaphoretic. No erythema. No pallor.   Psychiatric: She has a normal mood and affect.   Vitals reviewed.      Significant Labs: All pertinent labs within the past 24 hours have been reviewed.    Significant Imaging: I have reviewed all pertinent imaging results/findings within the past 24 hours.

## 2019-04-24 NOTE — ASSESSMENT & PLAN NOTE
ASSESSMENT/PLAN:   Mariann Huff is a 59 y.o. female s/p left ureteral injury on 4/12, who presented with fever, AMS, and intraabdominal abscess, taken for washout and ligation of left ureter with nephrostomy tube placement on 4/21.     Neuro:   - Sedated with propofol. Will change sedation to precedex gtt.  Plan for repeat SBT later today.  - Pain control with fentanyl    Pulmonary:   - Intubated and sedated on propofol. Changing to precedex gtt.   - Adjustments made to vent settings.   - Daily CXR   - ABGs prn   - Failed SBT yesterday x2.  Plan for SBT again this AM.    Recent Labs     04/24/19  0403   PH 7.425   PCO2 24.9*   PO2 157*   HCO3 16.3*   POCSATURATED 99   BE -8       Vent Mode: A/C  Oxygen Concentration (%):  [40-98] 40  Resp Rate Total:  [15 br/min-24 br/min] 20 br/min  Vt Set:  [450 mL] 450 mL  PEEP/CPAP:  [5 cmH20] 5 cmH20  Pressure Support:  [10 cmH20] 10 cmH20  Mean Airway Pressure:  [12 glV47-34 cmH20] 13 cmH20    Cardiac:   - HDS at this time.  Pt has been weaned off pressors.   - TTE ordered yesterday, EF 55%    Renal:    - S/p transection of left ureter 4/12 and subsequent ligation and PCT nephrostomy tube placement with IR 4/21  - Renal function improving. BUN 41 and Cr 1.5 today.    - UOP from Nephrostomy 955 cc/24 hrs;  Fontenot 1250 cc/ 24 hrs  - Monitor I/Os      Intake/Output Summary (Last 24 hours) at 4/24/2019 0615  Last data filed at 4/24/2019 0500  Gross per 24 hour   Intake 1058 ml   Output 2767 ml   Net -1709 ml     ID:   - Blood cultures 4/12 +E. Coli; sensitivities pending. Repeat Bld Cx show NGTD.   - UCx from 4/20 growing E. Coli; sensitivities are now back. Repeat Cx pending.   - ID following for assistance with abx therapy; discontinued Ertapenem and started on Ceftriaxone. Will need long term antibiotic therapy and ID follow-up.     Hem/Onc:   - H/H stable at this time; cont to monitor    Endocrine:  - DM Type 2 at baseline    - Pt has been off insulin gtt overnight;  discontinued and started on LDSSI for NPO  - Endocrine following for assistance with blood glucose control.     Fluids/Electrolytes/Nutrition/GI:   # Shock Liver   - Labs continue to show improvement   - Elevated LFTs now down trending; ;    - Thrombocytopenia; plts count improving; ocib368    - INR normalized to 1.0    # Lactic Acidosis   - Now resolved; LA 1.1 this AM    # Diarrhea   - Pt with multiple episodes of loose watery stool resembling urine noted from rectal tube; 800 ml watery stool overnight   - C.Diff panel Negative   - Concerns for possible fistula as fluid resembling urine in color.  Sample from rectal tube to for Cr analysis; awaiting results.    - MADHURI drain also with yellow fluid resembling urine.  Sample sent for Cr analysis, 1.7.     - Trickle feeds started 4/23 at 10 cc/hr  - Start free water flushes 300 cc q6h    - Replace electrolytes PRN      PPx:  - DVT: Plan to restart DVT ppx today       DISPO:  - Continue SICU care  - Plan SBT this AM

## 2019-04-24 NOTE — SUBJECTIVE & OBJECTIVE
Interval History/Significant Events: Failed SBT yesterday evening and resumed propofol gtt and Ac/VC+ vent settings. MADHURI drain and BMS drain with output liquid yellow, resembling urine, creatinine lab sent.    Follow-up For: Procedure(s) (LRB):  Creation, Nephrostomy, Percutaneous (Left)    Post-Operative Day: 3 Days Post-Op    Objective:     Vital Signs (Most Recent):  Temp: 99 °F (37.2 °C) (04/24/19 0300)  Pulse: 72 (04/24/19 0537)  Resp: (!) 4 (04/23/19 0731)  BP: 125/60 (04/24/19 0430)  SpO2: 97 % (04/24/19 0537) Vital Signs (24h Range):  Temp:  [97.9 °F (36.6 °C)-99.1 °F (37.3 °C)] 99 °F (37.2 °C)  Pulse:  [72-82] 72  Resp:  [4-20] 4  SpO2:  [97 %-100 %] 97 %  BP: ()/(50-67) 125/60     Weight: 72.6 kg (160 lb)  Body mass index is 27.46 kg/m².      Intake/Output Summary (Last 24 hours) at 4/24/2019 0612  Last data filed at 4/24/2019 0500  Gross per 24 hour   Intake 1058 ml   Output 2767 ml   Net -1709 ml       Physical Exam   Constitutional: She appears well-developed and well-nourished.   HENT:   Head: Normocephalic and atraumatic.   Intubated   Eyes: Right eye exhibits no discharge. Left eye exhibits no discharge.   Neck: Normal range of motion. Neck supple.   Cardiovascular: Normal rate and regular rhythm.   Pulmonary/Chest: Effort normal. No respiratory distress.   Intubated   Abdominal:   Midline incision c/d/i.  MADHURI drainage yellow.  Abdomen soft, non-distended. Rectal tube with watery yellow output. OG tube with watery output.   Genitourinary:   Genitourinary Comments: Nephrostomy tube in place with watery yellow output.  Fontenot catheter+   Musculoskeletal: Normal range of motion.   Neurological:   Sedated on propofol gtt   Skin: Skin is warm and dry.   Vitals reviewed.      Vents:  Vent Mode: A/C (04/24/19 0537)  Set Rate: 20 bmp (04/24/19 0537)  Vt Set: 450 mL (04/24/19 0537)  Pressure Support: 10 cmH20 (04/23/19 1135)  PEEP/CPAP: 5 cmH20 (04/24/19 0537)  Oxygen Concentration (%): 40 (04/24/19  0537)  Peak Airway Pressure: 35 cmH2O (04/24/19 0537)  Plateau Pressure: 35 cmH20 (04/24/19 0537)  Total Ve: 8.81 mL (04/24/19 0537)  F/VT Ratio<105 (RSBI): (!) 8.91 (04/23/19 0731)    Lines/Drains/Airways     Central Venous Catheter Line                 Percutaneous Central Line Insertion/Assessment - triple lumen  04/21/19 0100 right internal jugular 3 days          Drain                 Closed/Suction Drain 04/21/19 0300 LLQ 3 days         Urethral Catheter 04/20/19 1711 Latex 16 Fr. 3 days         NG/OG Tube 04/21/19 0728 Center mouth 2 days         Nephrostomy 04/21/19 0629 Left 8 Fr. 2 days         Rectal Tube 04/21/19 2100 rectal tube w/ balloon (indicate number of mLs) 2 days          Airway                 Airway - Non-Surgical 04/21/19 0029 Endotracheal Tube 3 days          Peripheral Intravenous Line                 Peripheral IV - Single Lumen 04/20/19 1650 18 G Left Antecubital 3 days         Peripheral IV - Single Lumen 04/20/19 1710 18 G Right Antecubital 3 days                Significant Labs:    CBC/Anemia Profile:  Recent Labs   Lab 04/22/19  1207 04/23/19  0400 04/24/19  0420   WBC 9.73 10.59 12.49   HGB 8.2* 8.0* 8.3*   HCT 24.1* 24.5* 26.4*   PLT 86* 101*  --    MCV 85 86 87   RDW 13.9 14.3 14.6*        Chemistries:  Recent Labs   Lab 04/22/19  1701 04/23/19  0400 04/24/19  0420    146* 148*   K 3.4* 3.9 4.0   * 115* 117*   CO2 21* 21* 18*   BUN 43* 43* 41*   CREATININE 1.9* 1.9* 1.5*   CALCIUM 8.0* 8.2* 8.5*   ALBUMIN 2.1* 1.8* 1.6*   PROT  --  5.2* 5.3*   BILITOT  --  5.8* 6.7*   ALKPHOS  --  133 161*   ALT  --  517* 371*   AST  --  734* 309*   MG  --  1.8 2.4   PHOS 3.0 2.2* 2.1*       ABGs:   Recent Labs   Lab 04/24/19  0403   PH 7.425   PCO2 24.9*   HCO3 16.3*   POCSATURATED 99   BE -8     Blood Culture:   Recent Labs   Lab 04/23/19  0430 04/23/19  0500   LABBLOO No Growth to date No Growth to date     Lactic Acid:   Recent Labs   Lab 04/22/19  1512 04/23/19  0400 04/23/19  1447    LACTATE 2.8* 1.7 1.1     Pathology Results  (Last 10 years)    None        Respiratory Culture: No results for input(s): GSRESP, RESPIRATORYC in the last 48 hours.  Urine Culture: No results for input(s): LABURIN in the last 48 hours.  Urine Studies: No results for input(s): COLORU, APPEARANCEUA, PHUR, SPECGRAV, PROTEINUA, GLUCUA, KETONESU, BILIRUBINUA, OCCULTUA, NITRITE, UROBILINOGEN, LEUKOCYTESUR, RBCUA, WBCUA, BACTERIA, SQUAMEPITHEL, HYALINECASTS in the last 48 hours.    Invalid input(s): WRIGHTSUR  All pertinent labs within the past 24 hours have been reviewed.    Significant Imaging:  I have reviewed and interpreted all pertinent imaging results/findings within the past 24 hours.

## 2019-04-24 NOTE — ASSESSMENT & PLAN NOTE
60 y/o female with HTN, DMII, CAD, NSTEMI, s/p L5-S1 OLIF with NSGY 4/12 c/b intraoperative left ureteral injury and underwent ureteroureteral anastomoses and ureteral stent placement. She was discharged with a correa and MADHURI drain that was removed on 4/16. She was seen by urology and anticipated stent removal in 2-3 months (6/2019).  She presents to ED with AMS and sepsis found to have air and fluid collection of left anterior prevertebral soft tissue with left ureter involvement. She underwent ex-lap and washout on 4/21. She is found to have E.coli bacteremia. We are consulted for abx recommendations.      Per op note,  There were pocket of purulence noted and evacuated, sent for culture of left retroperitoneum to pole of left kidney. The stent was visible. Posterior to the stent there was a pocket of purulent fluid and exposed hardware along the spinal vertebrae. The tissue along the distal and proximal end of ureter were friable and unhealthy. She underwent left nephrostomy tube placement. The stent was removed and several metal clips were placed on proximal end of the ureteral.     Recommendations:  1. Continue ceftriaxone [id number]. Added rifampin 300 mg q12hr IV (transition to oral once able) given hardware in place.   2. Repeat blood cultures NGTD.  3. Surgical cultures Staph lugdenesis.  Continue therapy for now. Follow susceptibles. Will tailor abx accordingly.   4. Contact isolation status.   5. Anticipate 6 weeks of IV abx followed by oral abx suppression given retained hardware.   6. Discussed with ID staff. ID will follow with you

## 2019-04-24 NOTE — PLAN OF CARE
Problem: Adult Inpatient Plan of Care  Goal: Plan of Care Review  Outcome: Ongoing (interventions implemented as appropriate)  Pt awakening to voice, intermittently following commands. Vent on AC 40%, 5 of PEEP. Propofol off this AM, switched to precedex. Pt either apneic or tachpyneic when vent switched to CPAP. D5 1/2 NS infusing @ 50 mL/hr UO 25- 50 mL/hr. TF @ 10 mL/hr, residuals 0. 100-150 output from nephrostomy Q4H, ~25 mL output from MADHURI drain Q4H, 0 output from BMS. Pt and family updated on plan of care throughout shift. See flow sheet for assessment and details.

## 2019-04-24 NOTE — PROGRESS NOTES
[facility]  Critical Care - Surgery  Progress Note    Patient Name: Mariann Huff  MRN: [id number]  Admission Date: 4/20/2019  Hospital Length of Stay: 4 days  Code Status: Full Code  Attending Provider: Paul Carlson MD  Primary Care Provider: Jasbir Haney MD   Principal Problem: Ureteral transection of left native kidney    Subjective:     Hospital/ICU Course:  No notes on file    Interval History/Significant Events: Failed SBT yesterday evening and resumed propofol gtt and Ac/VC+ vent settings. MADHURI drain and BMS drain with output liquid yellow, resembling urine, creatinine lab sent.    Follow-up For: Procedure(s) (LRB):  Creation, Nephrostomy, Percutaneous (Left)    Post-Operative Day: 3 Days Post-Op    Objective:     Vital Signs (Most Recent):  Temp: 99 °F (37.2 °C) (04/24/19 0300)  Pulse: 72 (04/24/19 0537)  Resp: (!) 4 (04/23/19 0731)  BP: 125/60 (04/24/19 0430)  SpO2: 97 % (04/24/19 0537) Vital Signs (24h Range):  Temp:  [97.9 °F (36.6 °C)-99.1 °F (37.3 °C)] 99 °F (37.2 °C)  Pulse:  [72-82] 72  Resp:  [4-20] 4  SpO2:  [97 %-100 %] 97 %  BP: ()/(50-67) 125/60     Weight: 72.6 kg (160 lb)  Body mass index is 27.46 kg/m².      Intake/Output Summary (Last 24 hours) at 4/24/2019 0612  Last data filed at 4/24/2019 0500  Gross per 24 hour   Intake 1058 ml   Output 2767 ml   Net -1709 ml       Physical Exam   Constitutional: She appears well-developed and well-nourished.   HENT:   Head: Normocephalic and atraumatic.   Intubated   Eyes: Right eye exhibits no discharge. Left eye exhibits no discharge.   Neck: Normal range of motion. Neck supple.   Cardiovascular: Normal rate and regular rhythm.   Pulmonary/Chest: Effort normal. No respiratory distress.   Intubated   Abdominal:   Midline incision c/d/i.  MADHURI drainage yellow.  Abdomen soft, non-distended. Rectal tube with watery yellow output. OG tube with watery output.   Genitourinary:   Genitourinary Comments: Nephrostomy tube in place with  watery yellow output.  Fontenot catheter+   Musculoskeletal: Normal range of motion.   Neurological:   Sedated on propofol gtt   Skin: Skin is warm and dry.   Vitals reviewed.      Vents:  Vent Mode: A/C (04/24/19 0537)  Set Rate: 20 bmp (04/24/19 0537)  Vt Set: 450 mL (04/24/19 0537)  Pressure Support: 10 cmH20 (04/23/19 1135)  PEEP/CPAP: 5 cmH20 (04/24/19 0537)  Oxygen Concentration (%): 40 (04/24/19 0537)  Peak Airway Pressure: 35 cmH2O (04/24/19 0537)  Plateau Pressure: 35 cmH20 (04/24/19 0537)  Total Ve: 8.81 mL (04/24/19 0537)  F/VT Ratio<105 (RSBI): (!) 8.91 (04/23/19 0731)    Lines/Drains/Airways     Central Venous Catheter Line                 Percutaneous Central Line Insertion/Assessment - triple lumen  04/21/19 0100 right internal jugular 3 days          Drain                 Closed/Suction Drain 04/21/19 0300 LLQ 3 days         Urethral Catheter 04/20/19 1711 Latex 16 Fr. 3 days         NG/OG Tube 04/21/19 0728 Center mouth 2 days         Nephrostomy 04/21/19 0629 Left 8 Fr. 2 days         Rectal Tube 04/21/19 2100 rectal tube w/ balloon (indicate number of mLs) 2 days          Airway                 Airway - Non-Surgical 04/21/19 0029 Endotracheal Tube 3 days          Peripheral Intravenous Line                 Peripheral IV - Single Lumen 04/20/19 1650 18 G Left Antecubital 3 days         Peripheral IV - Single Lumen 04/20/19 1710 18 G Right Antecubital 3 days                Significant Labs:    CBC/Anemia Profile:  Recent Labs   Lab 04/22/19  1207 04/23/19  0400 04/24/19  0420   WBC 9.73 10.59 12.49   HGB 8.2* 8.0* 8.3*   HCT 24.1* 24.5* 26.4*   PLT 86* 101*  --    MCV 85 86 87   RDW 13.9 14.3 14.6*        Chemistries:  Recent Labs   Lab 04/22/19  1701 04/23/19  0400 04/24/19  0420    146* 148*   K 3.4* 3.9 4.0   * 115* 117*   CO2 21* 21* 18*   BUN 43* 43* 41*   CREATININE 1.9* 1.9* 1.5*   CALCIUM 8.0* 8.2* 8.5*   ALBUMIN 2.1* 1.8* 1.6*   PROT  --  5.2* 5.3*   BILITOT  --  5.8* 6.7*    ALKPHOS  --  133 161*   ALT  --  517* 371*   AST  --  734* 309*   MG  --  1.8 2.4   PHOS 3.0 2.2* 2.1*       ABGs:   Recent Labs   Lab 04/24/19  0403   PH 7.425   PCO2 24.9*   HCO3 16.3*   POCSATURATED 99   BE -8     Blood Culture:   Recent Labs   Lab 04/23/19  0430 04/23/19  0500   LABBLOO No Growth to date No Growth to date     Lactic Acid:   Recent Labs   Lab 04/22/19  1512 04/23/19  0400 04/23/19  1447   LACTATE 2.8* 1.7 1.1     Pathology Results  (Last 10 years)    None        Respiratory Culture: No results for input(s): GSRESP, RESPIRATORYC in the last 48 hours.  Urine Culture: No results for input(s): LABURIN in the last 48 hours.  Urine Studies: No results for input(s): COLORU, APPEARANCEUA, PHUR, SPECGRAV, PROTEINUA, GLUCUA, KETONESU, BILIRUBINUA, OCCULTUA, NITRITE, UROBILINOGEN, LEUKOCYTESUR, RBCUA, WBCUA, BACTERIA, SQUAMEPITHEL, HYALINECASTS in the last 48 hours.    Invalid input(s): WRIGHTSUR  All pertinent labs within the past 24 hours have been reviewed.    Significant Imaging:  I have reviewed and interpreted all pertinent imaging results/findings within the past 24 hours.    Assessment/Plan:     * Ureteral transection of left native kidney    ASSESSMENT/PLAN:   Mariann Huff is a 59 y.o. female s/p left ureteral injury on 4/12, who presented with fever, AMS, and intraabdominal abscess, taken for washout and ligation of left ureter with nephrostomy tube placement on 4/21.     Neuro:   - Sedated with propofol. Will change sedation to precedex gtt.  Plan for repeat SBT later today.  - Pain control with fentanyl    Pulmonary:   - Intubated and sedated on propofol. Changing to precedex gtt.   - Adjustments made to vent settings.   - Daily CXR   - ABGs prn   - Failed SBT yesterday x2.  Plan for SBT again this AM.    Recent Labs     04/24/19  0403   PH 7.425   PCO2 24.9*   PO2 157*   HCO3 16.3*   POCSATURATED 99   BE -8       Vent Mode: A/C  Oxygen Concentration (%):  [40-98] 40  Resp Rate Total:  [15  br/min-24 br/min] 20 br/min  Vt Set:  [450 mL] 450 mL  PEEP/CPAP:  [5 cmH20] 5 cmH20  Pressure Support:  [10 cmH20] 10 cmH20  Mean Airway Pressure:  [12 maL32-25 cmH20] 13 cmH20    Cardiac:   - HDS at this time.  Pt has been weaned off pressors.   - TTE ordered yesterday, EF 55%    Renal:    - S/p transection of left ureter 4/12 and subsequent ligation and PCT nephrostomy tube placement with IR 4/21  - Renal function improving. BUN 41 and Cr 1.5 today.    - UOP from Nephrostomy 955 cc/24 hrs;  Fontenot 1250 cc/ 24 hrs  - Monitor I/Os      Intake/Output Summary (Last 24 hours) at 4/24/2019 0615  Last data filed at 4/24/2019 0500  Gross per 24 hour   Intake 1058 ml   Output 2767 ml   Net -1709 ml     ID:   - Blood cultures 4/12 +E. Coli; sensitivities pending. Repeat Bld Cx show NGTD.   - UCx from 4/20 growing E. Coli; sensitivities are now back. Repeat Cx pending.   - ID following for assistance with abx therapy; discontinued Ertapenem and started on Ceftriaxone. Will need long term antibiotic therapy and ID follow-up.     Hem/Onc:   - H/H stable at this time; cont to monitor    Endocrine:  - DM Type 2 at baseline    - Pt has been off insulin gtt overnight; discontinued and started on LDSSI for NPO  - Endocrine following for assistance with blood glucose control.     Fluids/Electrolytes/Nutrition/GI:   # Shock Liver   - Labs continue to show improvement   - Elevated LFTs now down trending; ;    - Thrombocytopenia; plts count improving; [id number]    - INR normalized to 1.0    # Lactic Acidosis   - Now resolved; LA 1.1 this AM    # Diarrhea   - Pt with multiple episodes of loose watery stool resembling urine noted from rectal tube; 800 ml watery stool overnight   - C.Diff panel Negative   - Concerns for possible fistula as fluid resembling urine in color.  Sample from rectal tube to for Cr analysis; awaiting results.    - MADHURI drain also with yellow fluid resembling urine.  Sample sent for Cr analysis, 1.7.      - Trickle feeds started 4/23 at 10 cc/hr  - Start free water flushes 300 cc q6h    - Replace electrolytes PRN      PPx:  - DVT: Plan to restart DVT ppx today       DISPO:  - Continue SICU care  - Plan SBT this AM             Critical care was time spent personally by me on the following activities: development of treatment plan with patient or surrogate and bedside caregivers, discussions with consultants, evaluation of patient's response to treatment, examination of patient, ordering and performing treatments and interventions, ordering and review of laboratory studies, ordering and review of radiographic studies, pulse oximetry, re-evaluation of patient's condition.  This critical care time did not overlap with that of any other provider or involve time for any procedures.     Baylee Ferreira, DO  Critical Care - Surgery  [facility]

## 2019-04-24 NOTE — PLAN OF CARE
Problem: Adult Inpatient Plan of Care  Goal: Plan of Care Review  Recommendations  Recommendation/Intervention:   1. If patient to remain intubated, recommend modifying TF to Impact Peptide 1.5 at a goal rate of 40 mL/hr - to provide 1440 kcal/day, 90g protein/day, and 739mL free fluid/day.   2. When able to extubate, ADAT to Diabetic with texture per SLP.   3. Per policy, RD to provide diabetic diet education to patients with A1c > 9.0. Patient with A1c 10.8 but intubated and not appropriate for education at this time. RD will follow-up.   RD to monitor.     Goals: Patient to receive nutrition by RD follow-up  Nutrition Goal Status: new    Full assessment completed, see RD Note 4/24/2019.

## 2019-04-24 NOTE — PROGRESS NOTES
[facility]  Nephrology  Progress Note    Patient Name: Mariann Huff  MRN: [id number]  Admission Date: 4/20/2019  Hospital Length of Stay: 4 days  Attending Provider: Paul Carlson MD   Primary Care Physician: Jasbir Haney MD  Principal Problem:Ureteral transection of left native kidney    Subjective:     Interval History:   NAEON.  Kidney function continues to improve.  SCr of stool resulted @ 1.7.  Electrolytes stable.  Good UOP.    Review of patient's allergies indicates:  No Known Allergies  Current Facility-Administered Medications   Medication Frequency    albuterol-ipratropium 2.5 mg-0.5 mg/3 mL nebulizer solution 3 mL Q4H PRN    calcium gluconate 1g in dextrose 5% 100mL (ready to mix system) PRN    calcium gluconate 1g in dextrose 5% 100mL (ready to mix system) PRN    calcium gluconate 1g in dextrose 5% 100mL (ready to mix system) PRN    cefTRIAXone (ROCEPHIN) 2 g in dextrose 5 % 50 mL IVPB Q24H    chlorhexidine 0.12 % solution 15 mL BID    dexmedetomidine (PRECEDEX) 400mcg/100mL 0.9% NaCL infusion Continuous    dextrose 5 % and 0.45 % NaCl infusion Continuous    dextrose 50% injection 12.5 g PRN    fentaNYL injection 50 mcg Q1H PRN    glucagon (human recombinant) injection 1 mg PRN    heparin (porcine) injection 5,000 Units Q8H    insulin aspart U-100 pen 0-5 Units Q6H PRN    magnesium sulfate 2g in water 50mL IVPB (premix) PRN    magnesium sulfate 2g in water 50mL IVPB (premix) PRN    nitroGLYCERIN SL tablet 0.3 mg Q5 Min PRN    pantoprazole injection 40 mg Daily    potassium chloride 40 mEq in 100 mL IVPB (FOR CENTRAL LINE ADMINISTRATION ONLY) PRN    And    potassium chloride 20 mEq in 100 mL IVPB (FOR CENTRAL LINE ADMINISTRATION ONLY) PRN    And    potassium chloride 40 mEq in 100 mL IVPB (FOR CENTRAL LINE ADMINISTRATION ONLY) PRN    sodium chloride 0.9% flush 10 mL PRN     Facility-Administered Medications Ordered in Other Encounters   Medication Frequency     lidocaine (PF) 10 mg/ml (1%) injection 10 mg Once       Objective:     Vital Signs (Most Recent):  Temp: 99.1 °F (37.3 °C) (04/24/19 1100)  Pulse: 66 (04/24/19 1230)  Resp: (!) 4 (04/23/19 0731)  BP: 122/64 (04/24/19 1230)  SpO2: 100 % (04/24/19 1230)  O2 Device (Oxygen Therapy): ventilator (04/24/19 1200) Vital Signs (24h Range):  Temp:  [98.8 °F (37.1 °C)-99.1 °F (37.3 °C)] 99.1 °F (37.3 °C)  Pulse:  [63-93] 66  SpO2:  [95 %-100 %] 100 %  BP: ()/(55-72) 122/64     Weight: 72.6 kg (160 lb) (04/22/19 1015)  Body mass index is 27.46 kg/m².  Body surface area is 1.81 meters squared.    I/O last 3 completed shifts:  In: 4082 [I.V.:3882; NG/GT:200]  Out: 4725 [Urine:2755; Drains:370; Stool:1600]    Physical Exam   Constitutional: She appears well-developed and well-nourished. No distress. She is intubated.   HENT:   Head: Atraumatic.   Neck: No JVD present.   Cardiovascular: Normal rate and regular rhythm.   Pulmonary/Chest: Effort normal. She is intubated. She has no wheezes. She has no rales.   Abdominal: Soft. She exhibits no distension.   Musculoskeletal: She exhibits edema (non-pitting). She exhibits no tenderness.   Neurological: She is alert.   Skin: Skin is warm. She is not diaphoretic.       Significant Labs:  CBC:   Recent Labs   Lab 04/24/19  0420   WBC 12.49   RBC 3.04*   HGB 8.3*   HCT 26.4*   *   MCV 87   MCH 27.3   MCHC 31.4*     CMP:   Recent Labs   Lab 04/24/19  0420   *   CALCIUM 8.5*   ALBUMIN 1.6*   PROT 5.3*   *   K 4.0   CO2 18*   *   BUN 41*   CREATININE 1.5*   ALKPHOS 161*   *   *   BILITOT 6.7*            Assessment/Plan:     PAPA (acute kidney injury)  PAPA, at time of consult non-oliguric, baseline normal renal function  PAPA likely 2/2 ischemic ATN from hemodynamic instability/septic shock.    Repeat renal US w/o signs of hydronephrosis.  Nephrostomy tube/correa draining clear/sanford urine.    BMS draining yellow liquid stool resembling  urine.    Plan/Recommendations:  -kidney function continues to improve, SCr down to 1.5 from 1.9.  -no need for RRT at this time.  - SCr of stool only 1.7, unlikely to be fistula formation.  -nephrology will sign-off at this time.  Please re-consult as needed.          Chriss Ziegler NP  Nephrology  [facility]

## 2019-04-24 NOTE — ASSESSMENT & PLAN NOTE
BG goal 140 - 180    Continue Low Dose SQ Insulin Correction Scale.  BG monitoring every 6 hours if patient continues to not need SQ insulin correction.     ** Please notify Endocrine for any change and/or advance in diet/TF**  ** Please call Endocrine for any BG related issues **    Discharge Recommendations:     TBD.

## 2019-04-24 NOTE — ASSESSMENT & PLAN NOTE
PAPA, at time of consult non-oliguric, baseline normal renal function  PAPA likely 2/2 ischemic ATN from hemodynamic instability/septic shock.    Repeat renal US w/o signs of hydronephrosis.  Nephrostomy tube/correa draining clear/sanford urine.    BMS draining yellow liquid stool resembling urine.    Plan/Recommendations:  -kidney function continues to improve, SCr down to 1.5 from 1.9.  -no need for RRT at this time.  - SCr of stool only 1.7, unlikely to be fistula formation.  -nephrology will sign-off at this time.  Please re-consult as needed.

## 2019-04-24 NOTE — PROGRESS NOTES
"[facility]  Adult Nutrition  Progress Note    SUMMARY       Recommendations  Recommendation/Intervention:   1. If patient to remain intubated, recommend modifying TF to Impact Peptide 1.5 at a goal rate of 40 mL/hr - to provide 1440 kcal/day, 90g protein/day, and 739mL free fluid/day.   2. When able to extubate, ADAT to Diabetic with texture per SLP.   3. Per policy, RD to provide diabetic diet education to patients with A1c > 9.0. Patient with A1c 10.8 but intubated and not appropriate for education at this time. RD will follow-up.   RD to monitor.    Goals: Patient to receive nutrition by RD follow-up  Nutrition Goal Status: new  Communication of RD Recs: reviewed with RN    Reason for Assessment  Reason For Assessment: new tube feeding  Diagnosis: surgery/postoperative complications(ureteral transection of L kidney)  Relevant Medical History: CAD, DM2, HTN, HLD  General Information Comments: S/p OLIF 4/12 c/b L ureteral injury requiring repair 4/12. Now s/p ex lap & stent removal 4/21. Still intubated, sedated. Trickle TF started yesterday. (Patient appears nourished, currently with no physical signs of malnutrition, and no weight loss PTA. RD does not feel patient meets crtieria for malnutrition at this time.)  Nutrition Discharge Planning: Unable to determine at this time.    Nutrition Risk Screen  Nutrition Risk Screen: no indicators present    Nutrition/Diet History  Factors Affecting Nutritional Intake: NPO, on mechanical ventilation    Anthropometrics  Temp: 99 °F (37.2 °C)  Height: 5' 4" (162.6 cm)  Height (inches): 64 in  Weight Method: Estimated  Weight: 72.6 kg (160 lb)  Weight (lb): 160 lb  Ideal Body Weight (IBW), Female: 120 lb  % Ideal Body Weight, Female (lb): 133.33 lb  BMI (Calculated): 27.5  BMI Grade: 25 - 29.9 - overweight    Lab/Procedures/Meds  Pertinent Labs Reviewed: reviewed  Pertinent Labs Comments: Na 148, Cl 117, BUN 41, Creat 1.5, Glu 116, POCT Glu 101-111, HgbA1c " 10.8, Ca 8.5, Phos 2.1, Alb 1.6  Pertinent Medications Reviewed: reviewed  Pertinent Medications Comments: pantoprazole, precedex, levophed, vasopressin    Estimated/Assessed Needs  Weight Used For Calorie Calculations: 72.6 kg (160 lb 0.9 oz)  Energy Calorie Requirements (kcal): 1442 kcal/day  Energy Need Method: [facility]  Protein Requirements:  g/day(1.2-1.5 g/kg)  Weight Used For Protein Calculations: 72.6 kg (160 lb 0.9 oz)  Fluid Requirements (mL): 1 mL/kcal or per MD  Estimated Fluid Requirement Method: RDA Method  RDA Method (mL): 1442    Nutrition Prescription Ordered  Current Diet Order: NPO  Current Nutrition Support Formula Ordered: Peptamen Intense VHP  Current Nutrition Support Rate Ordered: 10 (ml)  Current Nutrition Support Frequency Ordered: mL/hr    Evaluation of Received Nutrient/Fluid Intake  Enteral Calories (kcal): 240  Enteral Protein (gm): 22  Enteral (Free Water) Fluid (mL): 202  % Kcal Needs: 17  % Protein Needs: 25  I/O: -1.6L x 24hrs, +16.7L since admit  Energy Calories Required: not meeting needs  Protein Required: not meeting needs  Fluid Required: (per MD)  Comments: LBM 4/23  Tolerance: tolerating  % Intake of Estimated Energy Needs: 0 - 25 %  % Meal Intake: NPO    Nutrition Risk  Level of Risk/Frequency of Follow-up: high(2x/week)     Assessment and Plan  Nutrition Problem  Inadequate energy intake    Related to (etiology):   Decreased ability to consume sufficient energy    Signs and Symptoms (as evidenced by):   NPO, only trickle TF at this time    Interventions/Recommendations (treatment strategy):  Collaboration and referral of nutrition care    Nutrition Diagnosis Status:   New    Monitor and Evaluation  Food and Nutrient Intake: energy intake, enteral nutrition intake  Food and Nutrient Adminstration: enteral and parenteral nutrition administration  Anthropometric Measurements: weight, weight change  Biochemical Data, Medical Tests and Procedures: electrolyte and renal  panel, gastrointestinal profile, glucose/endocrine profile, inflammatory profile  Nutrition-Focused Physical Findings: overall appearance     Nutrition Follow-Up  RD Follow-up?: Yes

## 2019-04-24 NOTE — SUBJECTIVE & OBJECTIVE
Interval History:   NAEON.  Kidney function continues to improve.  SCr of stool resulted @ 1.7.  Electrolytes stable.  Good UOP.    Review of patient's allergies indicates:  No Known Allergies  Current Facility-Administered Medications   Medication Frequency    albuterol-ipratropium 2.5 mg-0.5 mg/3 mL nebulizer solution 3 mL Q4H PRN    calcium gluconate 1g in dextrose 5% 100mL (ready to mix system) PRN    calcium gluconate 1g in dextrose 5% 100mL (ready to mix system) PRN    calcium gluconate 1g in dextrose 5% 100mL (ready to mix system) PRN    cefTRIAXone (ROCEPHIN) 2 g in dextrose 5 % 50 mL IVPB Q24H    chlorhexidine 0.12 % solution 15 mL BID    dexmedetomidine (PRECEDEX) 400mcg/100mL 0.9% NaCL infusion Continuous    dextrose 5 % and 0.45 % NaCl infusion Continuous    dextrose 50% injection 12.5 g PRN    fentaNYL injection 50 mcg Q1H PRN    glucagon (human recombinant) injection 1 mg PRN    heparin (porcine) injection 5,000 Units Q8H    insulin aspart U-100 pen 0-5 Units Q6H PRN    magnesium sulfate 2g in water 50mL IVPB (premix) PRN    magnesium sulfate 2g in water 50mL IVPB (premix) PRN    nitroGLYCERIN SL tablet 0.3 mg Q5 Min PRN    pantoprazole injection 40 mg Daily    potassium chloride 40 mEq in 100 mL IVPB (FOR CENTRAL LINE ADMINISTRATION ONLY) PRN    And    potassium chloride 20 mEq in 100 mL IVPB (FOR CENTRAL LINE ADMINISTRATION ONLY) PRN    And    potassium chloride 40 mEq in 100 mL IVPB (FOR CENTRAL LINE ADMINISTRATION ONLY) PRN    sodium chloride 0.9% flush 10 mL PRN     Facility-Administered Medications Ordered in Other Encounters   Medication Frequency    lidocaine (PF) 10 mg/ml (1%) injection 10 mg Once       Objective:     Vital Signs (Most Recent):  Temp: 99.1 °F (37.3 °C) (04/24/19 1100)  Pulse: 66 (04/24/19 1230)  Resp: (!) 4 (04/23/19 0731)  BP: 122/64 (04/24/19 1230)  SpO2: 100 % (04/24/19 1230)  O2 Device (Oxygen Therapy): ventilator (04/24/19 1200) Vital Signs (24h  Range):  Temp:  [98.8 °F (37.1 °C)-99.1 °F (37.3 °C)] 99.1 °F (37.3 °C)  Pulse:  [63-93] 66  SpO2:  [95 %-100 %] 100 %  BP: ()/(55-72) 122/64     Weight: 72.6 kg (160 lb) (04/22/19 1015)  Body mass index is 27.46 kg/m².  Body surface area is 1.81 meters squared.    I/O last 3 completed shifts:  In: 4082 [I.V.:3882; NG/GT:200]  Out: 4725 [Urine:2755; Drains:370; Stool:1600]    Physical Exam   Constitutional: She appears well-developed and well-nourished. No distress. She is intubated.   HENT:   Head: Atraumatic.   Neck: No JVD present.   Cardiovascular: Normal rate and regular rhythm.   Pulmonary/Chest: Effort normal. She is intubated. She has no wheezes. She has no rales.   Abdominal: Soft. She exhibits no distension.   Musculoskeletal: She exhibits edema (non-pitting). She exhibits no tenderness.   Neurological: She is alert.   Skin: Skin is warm. She is not diaphoretic.       Significant Labs:  CBC:   Recent Labs   Lab 04/24/19  0420   WBC 12.49   RBC 3.04*   HGB 8.3*   HCT 26.4*   *   MCV 87   MCH 27.3   MCHC 31.4*     CMP:   Recent Labs   Lab 04/24/19  0420   *   CALCIUM 8.5*   ALBUMIN 1.6*   PROT 5.3*   *   K 4.0   CO2 18*   *   BUN 41*   CREATININE 1.5*   ALKPHOS 161*   *   *   BILITOT 6.7*

## 2019-04-24 NOTE — SUBJECTIVE & OBJECTIVE
Subjective: no AE. AFVSS        Past Medical History:   Diagnosis Date    Anticoagulant long-term use     Asthma     Back pain     CAD (coronary artery disease)     s/p stentimg  (2), (1)    Carotid artery stenosis     Diabetes mellitus type 2 in obese     HTN (hypertension), benign     Hyperlipidemia     Keloid cicatrix     NPDR (nonproliferative diabetic retinopathy) 2015    NSTEMI (non-ST elevated myocardial infarction)     Nuclear sclerosis - Right Eye 3/18/2014    Primary localized osteoarthrosis, lower leg 2014    Sleep apnea      Past Surgical History:   Procedure Laterality Date    CARDIAC CATHETERIZATION      cataract extraction left eye      cataracts      CATHETERIZATION, HEART, LEFT Left 2014    Performed by Wilman Kim MD at [facility] CATH LAB     SECTION, LOW TRANSVERSE      CORONARY ANGIOPLASTY      Creation, Nephrostomy, Percutaneous Left 2019    Performed by Karina Surgeon at [facility] KARINA    ESOPHAGOGASTRODUODENOSCOPY (EGD) N/A 2016    Performed by Gardenia Adamson MD at [facility] ENDO (4TH FLR)    EXCISION TURBINATE, SUBMUCOUS      FUSION, SPINE, LUMBAR, ANTERIOR APPROACH Left L5-S1 Anterior to Psoa Interbody Fusion, L5-S1 Posterior Instrumentation Left 2019    Performed by Mk George MD at [facility] OR 2ND FLR    HAND SURGERY Left     HAND SURGERY Right     torn ligament repair/ Dr. Yeboah    HYSTERECTOMY      Injection,steroid,epidural,transforaminal approach - Bilateral - S1 Bilateral 2018    Performed by Tl Abreu MD at [facility] PAIN MGT    Injection-steroid-epidural-caudal N/A 2019    Performed by Dave Bentley Jr., MD at [facility] PAIN MGT    INSERTION-INTRAOCULAR LENS (IOL) Right 2015    Performed by Good Domingo MD at [facility] OR 1ST FLR    LAPAROTOMY, EXPLORATORY; LIGATION OF LEFT URETER; DOUBLE J STENT REMOVAL LEFT URETER Left 2019    Performed by Paul Carlson MD at [facility] OR 2ND FLR     left foot surgery      left wrist surgery      NASAL SEPTUM SURGERY  5/7/15    PHACOEMULSIFICATION-ASPIRATION-CATARACT Right 9/1/2015    Performed by Good Domingo MD at [facility] OR 1ST FLR    REPAIR, URETER  4/12/2019    Performed by Mk George MD at [facility] OR 2ND FLR    RESECTION-TURBINATES (SMR) N/A 5/7/2015    Performed by Dileep Dubois III, MD at [facility] OR 2ND FLR    rt elbow surgery      S/P LAD COATED STENT  05/14/2010    6 total     S/P OM1 STENT  08/2003    SEPTOPLASTY N/A 5/7/2015    Performed by Dileep Dubois III, MD at [facility] OR 2ND FLR    SINUS SURGERY      F.E.S.S.    SINUS SURGERY FUNCTIONAL ENDOSCOPIC WITH NAVIGATION WITH MAXILLARIES, ETHMOIDS, LEFT SPHENOID, LEFT LOLY N/A 5/7/2015    Performed by Dileep Dubois III, MD at [facility] OR 2ND FLR    STENT, URETERAL placement  4/12/2019    Performed by Robin Boyd MD at [facility] OR 2ND FLR    TUBAL LIGATION       Family History     Problem Relation (Age of Onset)    Diabetes Mother, Father, Sister, Brother, Sister    Heart attack Father    Heart disease Mother    Leukemia Father    No Known Problems Sister, Brother, Brother, Maternal Grandmother, Maternal Grandfather, Paternal Grandmother, Paternal Grandfather, Son, Son, Maternal Aunt, Maternal Uncle, Paternal Aunt, Paternal Uncle        Tobacco Use    Smoking status: Never Smoker    Smokeless tobacco: Never Used   Substance and Sexual Activity    Alcohol use: No     Alcohol/week: 0.0 oz    Drug use: No    Sexual activity: Yes     Partners: Male     Birth control/protection: Post-menopausal     Comment:        Objective:     Weight: 72.6 kg (160 lb)  Body mass index is 27.46 kg/m².  Vital Signs (Most Recent):  Temp: 99 °F (37.2 °C) (04/24/19 1500)  Pulse: 66 (04/24/19 1500)  Resp: (!) 4 (04/23/19 0731)  BP: 131/65 (04/24/19 1500)  SpO2: 100 % (04/24/19 1500) Vital Signs (24h Range):  Temp:  [98.9 °F (37.2 °C)-99.1 °F (37.3 °C)] 99 °F (37.2 °C)  Pulse:  [63-93] 66  SpO2:   [95 %-100 %] 100 %  BP: ()/(55-72) 131/65     Date 04/24/19 0700 - 04/25/19 0659   Shift 4770-9506 7308-7882 6882-3996 24 Hour Total   INTAKE   NG/ 10  390   Shift Total(mL/kg) 380(5.2) 10(0.1)  390(5.4)   OUTPUT   Urine(mL/kg/hr) 625(1.1) 170  795   Drains 50 28  78   Stool 200   200   Shift Total(mL/kg) 875(12.1) 198(2.7)  1073(14.8)   Weight (kg) 72.6 72.6 72.6 72.6              Vent Mode: A/C  Oxygen Concentration (%):  [39-98] 39  Resp Rate Total:  [13 br/min-50 br/min] 19 br/min  Vt Set:  [350 mL-450 mL] 350 mL  PEEP/CPAP:  [5 cmH20] 5 cmH20  Pressure Support:  [10 wpL72-26 cmH20] 20 cmH20  Mean Airway Pressure:  [9.3 toM46-93 cmH20] 12 cmH20         Closed/Suction Drain 04/12/19 2115 Left Other (Comment) Bulb 10 Fr. (Active)            Urethral Catheter 04/12/19 1650 Non-latex;Straight-tip (Active)            Urethral Catheter 04/20/19 1711 Latex 16 Fr. (Active)   Output (mL) 900 mL 4/20/2019  5:00 PM       Physical Exam:  Nursing note and vitals reviewed.    Constitutional: She appears well-developed and well-nourished.     Eyes: Pupils are equal, round, and reactive to light.     Neurological:   Intubated, sedated- propofol.     PERRL. +corneal/cough/gag.   Wiggles toes to commands B.      Significant Labs:  Recent Labs   Lab 04/22/19  1701 04/23/19  0400 04/24/19  0420    99 116*    146* 148*   K 3.4* 3.9 4.0   * 115* 117*   CO2 21* 21* 18*   BUN 43* 43* 41*   CREATININE 1.9* 1.9* 1.5*   CALCIUM 8.0* 8.2* 8.5*   MG  --  1.8 2.4     Recent Labs   Lab 04/23/19  0400 04/24/19  0420   WBC 10.59 12.49   HGB 8.0* 8.3*   HCT 24.5* 26.4*   * 104*     Recent Labs   Lab 04/23/19  0400 04/24/19  0420   INR 1.2 1.0     Microbiology Results (last 7 days)     Procedure Component Value Units Date/Time    Aerobic culture [[id number]]  (Susceptibility) Collected:  04/21/19 0125    Order Status:  Completed Specimen:  Body Fluid from Abdomen Updated:  04/24/19 1501     Aerobic Bacterial  Culture --     STAPHYLOCOCCUS LUGDUNENSIS  Rare      Narrative:       Retroperitoneal fluid    Blood culture [[id number]] Collected:  04/24/19 0950    Order Status:  Sent Specimen:  Blood from Peripheral, Wrist, Right Updated:  04/24/19 1120    Culture, Anaerobe [[id number]] Collected:  04/21/19 0125    Order Status:  Completed Specimen:  Body Fluid from Abdomen Updated:  04/24/19 0911     Anaerobic Culture Culture in progress    Narrative:       Retroperitoneal fluid    Blood culture [[id number]] Collected:  04/21/19 1835    Order Status:  Completed Specimen:  Blood from Peripheral, Hand, Left Updated:  04/24/19 0902     Blood Culture, Routine Gram stain aer bottle: Gram negative rods     Blood Culture, Routine Results called to and read back by: Carmenza Wallace RN 04/22/2019  10:14     Blood Culture, Routine --     ESCHERICHIA COLI  For susceptibility see order #[id number]      Blood culture [[id number]] Collected:  04/23/19 0430    Order Status:  Completed Specimen:  Blood from Peripheral, Ankle, Right Updated:  04/24/19 0822     Blood Culture, Routine No Growth to date     Blood Culture, Routine No Growth to date    Narrative:       Please collect with AM labs    Blood culture [[id number]] Collected:  04/23/19 0500    Order Status:  Completed Specimen:  Blood from Peripheral, Antecubital, Right Updated:  04/24/19 0822     Blood Culture, Routine No Growth to date     Blood Culture, Routine No Growth to date    Narrative:       Please collect with AM labs    Blood culture x two cultures. Draw prior to antibiotics. [[id number]] Collected:  04/20/19 1711    Order Status:  Completed Specimen:  Blood from Peripheral, Antecubital, Right Updated:  04/23/19 0910     Blood Culture, Routine Gram stain aer bottle: Gram negative rods     Blood Culture, Routine Gram stain flaco bottle: Gram negative rods      Blood Culture, Routine Results called to and read back by: Pancho Mars RN  04/21/2019  03:18     Blood Culture, Routine --      ESCHERICHIA COLI  For susceptibility see order #[id number]      Narrative:       Aerobic and anaerobic    Blood culture x two cultures. Draw prior to antibiotics. [[id number]]  (Susceptibility) Collected:  04/20/19 1705    Order Status:  Completed Specimen:  Blood from Peripheral, Antecubital, Right Updated:  04/23/19 0910     Blood Culture, Routine Gram stain aer bottle: Gram negative rods     Blood Culture, Routine Gram stain flaco bottle: Gram negative rods      Blood Culture, Routine Positive results previously called 04/21/2019  06:36     Blood Culture, Routine ESCHERICHIA COLI    Narrative:       Aerobic and anaerobic    Urine culture [[id number]] Collected:  04/21/19 2256    Order Status:  Completed Specimen:  Urine Updated:  04/23/19 0648     Urine Culture, Routine No significant growth    Narrative:       Preferred Collection Type->Urine, Clean Catch  YELLOW AND GREY RECEIVED    Urine culture [[id number]]  (Susceptibility) Collected:  04/20/19 1711    Order Status:  Completed Specimen:  Urine Updated:  04/22/19 1935     Urine Culture, Routine --     ESCHERICHIA COLI  >100,000 cfu/ml      Narrative:       Preferred Collection Type->Urine, Clean Catch    Urine culture [[id number]] Collected:  04/21/19 0640    Order Status:  Completed Specimen:  Kidney, left Updated:  04/22/19 1205     Urine Culture, Routine No growth    Narrative:       Urine sample from left nephostomy  WAS TOLD BY [employer] TO RE-ORDER AS URINE CULTURE 04/21/2019    06:52      Clostridium difficile EIA [[id number]] Collected:  04/22/19 0812    Order Status:  Completed Specimen:  Stool Updated:  04/22/19 1138     C. diff Antigen Negative     C difficile Toxins A+B, EIA Negative     Comment: Testing not recommended for children <24 months old.       Blood culture [[id number]]     Order Status:  Canceled Specimen:  Blood     Gram stain [[id number]] Collected:  04/20/19 1711    Order Status:  Completed Specimen:  Urine from Catheterized Updated:   04/21/19 1328     Gram Stain Result Rare WBC's      Few Gram negative rods    Narrative:       add on GRAM #[id number] per Dr. Higuera @  04/21/2019  11:07     Gram stain [[id number]]     Order Status:  Completed Specimen:  Urine, Catheterized     Culture, Body Fluid - Bactec [[id number]] Collected:  04/21/19 0641    Order Status:  Canceled Specimen:  Body Fluid from Bile Updated:  04/21/19 0643    Culture, Body Fluid - Bactec [[id number]] Collected:  04/21/19 0641    Order Status:  Canceled Specimen:  Body Fluid from Bile Updated:  04/21/19 0643    Culture, Anaerobe [[id number]] Collected:  04/21/19 0640    Order Status:  Canceled Specimen:  Body Fluid from Back Updated:  04/21/19 0642    Aerobic culture [[id number]] Collected:  04/21/19 0640    Order Status:  Canceled Specimen:  Kidney, left from Back Updated:  04/21/19 0642    Influenza A & B by Molecular [[id number]] Collected:  04/20/19 1713    Order Status:  Completed Specimen:  Nasopharyngeal Swab Updated:  04/20/19 1804     Influenza A, Molecular Negative     Influenza B, Molecular Negative     Flu A & B Source Nasal swab        CRP:   No results for input(s): CRP in the last 48 hours.  ESR: No results for input(s): POCESR, ERYTHROCYTES in the last 48 hours.    Significant Diagnostics:  I have reviewed all pertinent imaging results/findings within the past 24 hours.

## 2019-04-24 NOTE — PROGRESS NOTES
[facility]  Neurosurgery  Progress Note    Subjective:     History of Present Illness: Ms Huff is a 59F with history of HTN, DM, CAD, NSTEMI, and recent L5-S1 OLIF on  with intraoperative L ureteral injury repaired by urology with stent that presents to ED with AMS and fevers, concerning for sepsis.  states that he noticed this morning that she was beginning to show signs of altered mental status and developed fevers. She has been ambulating around the house with a walker, no recent changes in numbness/weakness. Back and lateral incisions are clean/dry/intact.          Post-Op Info:  Procedure(s) (LRB):  Creation, Nephrostomy, Percutaneous (Left)   3 Days Post-Op     Subjective: no AE. AFVSS        Past Medical History:   Diagnosis Date    Anticoagulant long-term use     Asthma     Back pain     CAD (coronary artery disease)     s/p stentimg  (2), (1)    Carotid artery stenosis     Diabetes mellitus type 2 in obese     HTN (hypertension), benign     Hyperlipidemia     Keloid cicatrix     NPDR (nonproliferative diabetic retinopathy) 2015    NSTEMI (non-ST elevated myocardial infarction)     Nuclear sclerosis - Right Eye 3/18/2014    Primary localized osteoarthrosis, lower leg 2014    Sleep apnea      Past Surgical History:   Procedure Laterality Date    CARDIAC CATHETERIZATION      cataract extraction left eye      cataracts      CATHETERIZATION, HEART, LEFT Left 2014    Performed by Wilman Kim MD at [facility] CATH LAB     SECTION, LOW TRANSVERSE      CORONARY ANGIOPLASTY      Creation, Nephrostomy, Percutaneous Left 2019    Performed by Jason Surgeon at [facility] JASON    ESOPHAGOGASTRODUODENOSCOPY (EGD) N/A 2016    Performed by Gardenia Adamson MD at [facility] ENDO (4TH FLR)    EXCISION TURBINATE, SUBMUCOUS      FUSION, SPINE, LUMBAR, ANTERIOR APPROACH Left L5-S1 Anterior to Psoa Interbody Fusion, L5-S1 Posterior  Instrumentation Left 4/12/2019    Performed by Mk George MD at [facility] OR 2ND FLR    HAND SURGERY Left     HAND SURGERY Right     torn ligament repair/ Dr. Yeboah    HYSTERECTOMY      Injection,steroid,epidural,transforaminal approach - Bilateral - S1 Bilateral 9/25/2018    Performed by Tl Abreu MD at [facility] PAIN MGT    Injection-steroid-epidural-caudal N/A 2/7/2019    Performed by Dave Bentley Jr., MD at [facility] PAIN MGT    INSERTION-INTRAOCULAR LENS (IOL) Right 9/1/2015    Performed by Good Domingo MD at [facility] OR 1ST FLR    LAPAROTOMY, EXPLORATORY; LIGATION OF LEFT URETER; DOUBLE J STENT REMOVAL LEFT URETER Left 4/20/2019    Performed by Paul Carlson MD at [facility] OR [address] [place] [address]    left foot surgery      left wrist surgery      NASAL SEPTUM SURGERY  5/7/15    PHACOEMULSIFICATION-ASPIRATION-CATARACT Right 9/1/2015    Performed by Good Domingo MD at [facility] OR [facility] FLR    REPAIR, URETER  4/12/2019    Performed by Mk George MD at [facility] OR [address] [place] [address]    RESECTION-TURBINATES (SMR) N/A 5/7/2015    Performed by Dileep Dubois III, MD at [facility] OR [address] [place] [address]    rt elbow surgery      S/P LAD COATED STENT  05/14/2010    6 total     S/P OM1 STENT  08/2003    SEPTOPLASTY N/A 5/7/2015    Performed by Dileep Dubois III, MD at [facility] OR [address] [place] [address]    SINUS SURGERY      F.E.S.S.    SINUS SURGERY FUNCTIONAL ENDOSCOPIC WITH NAVIGATION WITH MAXILLARIES, ETHMOIDS, LEFT SPHENOID, LEFT LOLY N/A 5/7/2015    Performed by Dileep Dubois III, MD at [facility] OR [address] [place] [address]    STENT, URETERAL placement  4/12/2019    Performed by Robin Boyd MD at [facility] OR [address] [place] [address]    TUBAL LIGATION       Family History     Problem Relation (Age of Onset)    Diabetes Mother, Father, Sister, Brother, Sister    Heart attack Father    Heart disease Mother    Leukemia Father    No Known Problems Sister, Brother, Brother, Maternal Grandmother, Maternal Grandfather, Paternal Grandmother, Paternal Grandfather, Son, Son,  Maternal Aunt, Maternal Uncle, Paternal Aunt, Paternal Uncle        Tobacco Use    Smoking status: Never Smoker    Smokeless tobacco: Never Used   Substance and Sexual Activity    Alcohol use: No     Alcohol/week: 0.0 oz    Drug use: No    Sexual activity: Yes     Partners: Male     Birth control/protection: Post-menopausal     Comment:        Objective:     Weight: 72.6 kg (160 lb)  Body mass index is 27.46 kg/m².  Vital Signs (Most Recent):  Temp: 99 °F (37.2 °C) (04/24/19 1500)  Pulse: 66 (04/24/19 1500)  Resp: (!) 4 (04/23/19 0731)  BP: 131/65 (04/24/19 1500)  SpO2: 100 % (04/24/19 1500) Vital Signs (24h Range):  Temp:  [98.9 °F (37.2 °C)-99.1 °F (37.3 °C)] 99 °F (37.2 °C)  Pulse:  [63-93] 66  SpO2:  [95 %-100 %] 100 %  BP: ()/(55-72) 131/65     Date 04/24/19 0700 - 04/25/19 0659   Shift 3941-6311 2172-5907 3317-0023 24 Hour Total   INTAKE   NG/ 10  390   Shift Total(mL/kg) 380(5.2) 10(0.1)  390(5.4)   OUTPUT   Urine(mL/kg/hr) 625(1.1) 170  795   Drains 50 28  78   Stool 200   200   Shift Total(mL/kg) 875(12.1) 198(2.7)  1073(14.8)   Weight (kg) 72.6 72.6 72.6 72.6              Vent Mode: A/C  Oxygen Concentration (%):  [39-98] 39  Resp Rate Total:  [13 br/min-50 br/min] 19 br/min  Vt Set:  [350 mL-450 mL] 350 mL  PEEP/CPAP:  [5 cmH20] 5 cmH20  Pressure Support:  [10 yfA14-88 cmH20] 20 cmH20  Mean Airway Pressure:  [9.3 krS52-20 cmH20] 12 cmH20         Closed/Suction Drain 04/12/19 2115 Left Other (Comment) Bulb 10 Fr. (Active)            Urethral Catheter 04/12/19 1650 Non-latex;Straight-tip (Active)            Urethral Catheter 04/20/19 1711 Latex 16 Fr. (Active)   Output (mL) 900 mL 4/20/2019  5:00 PM       Physical Exam:  Nursing note and vitals reviewed.    Constitutional: She appears well-developed and well-nourished.     Eyes: Pupils are equal, round, and reactive to light.     Neurological:   Intubated, sedated- propofol.     PERRL. +corneal/cough/gag.   Wiggles toes to commands  B.      Significant Labs:  Recent Labs   Lab 04/22/19  1701 04/23/19  0400 04/24/19  0420    99 116*    146* 148*   K 3.4* 3.9 4.0   * 115* 117*   CO2 21* 21* 18*   BUN 43* 43* 41*   CREATININE 1.9* 1.9* 1.5*   CALCIUM 8.0* 8.2* 8.5*   MG  --  1.8 2.4     Recent Labs   Lab 04/23/19  0400 04/24/19  0420   WBC 10.59 12.49   HGB 8.0* 8.3*   HCT 24.5* 26.4*   * 104*     Recent Labs   Lab 04/23/19  0400 04/24/19  0420   INR 1.2 1.0     Microbiology Results (last 7 days)     Procedure Component Value Units Date/Time    Aerobic culture [[id number]]  (Susceptibility) Collected:  04/21/19 0125    Order Status:  Completed Specimen:  Body Fluid from Abdomen Updated:  04/24/19 1501     Aerobic Bacterial Culture --     STAPHYLOCOCCUS LUGDUNENSIS  Rare      Narrative:       Retroperitoneal fluid    Blood culture [[id number]] Collected:  04/24/19 0950    Order Status:  Sent Specimen:  Blood from Peripheral, Wrist, Right Updated:  04/24/19 1120    Culture, Anaerobe [[id number]] Collected:  04/21/19 0125    Order Status:  Completed Specimen:  Body Fluid from Abdomen Updated:  04/24/19 0911     Anaerobic Culture Culture in progress    Narrative:       Retroperitoneal fluid    Blood culture [[id number]] Collected:  04/21/19 1835    Order Status:  Completed Specimen:  Blood from Peripheral, Hand, Left Updated:  04/24/19 0902     Blood Culture, Routine Gram stain aer bottle: Gram negative rods     Blood Culture, Routine Results called to and read back by: Carmenza Wallace RN 04/22/2019  10:14     Blood Culture, Routine --     ESCHERICHIA COLI  For susceptibility see order #[id number]      Blood culture [[id number]] Collected:  04/23/19 0430    Order Status:  Completed Specimen:  Blood from Peripheral, Ankle, Right Updated:  04/24/19 0822     Blood Culture, Routine No Growth to date     Blood Culture, Routine No Growth to date    Narrative:       Please collect with AM labs    Blood culture [[id number]] Collected:   04/23/19 0500    Order Status:  Completed Specimen:  Blood from Peripheral, Antecubital, Right Updated:  04/24/19 0822     Blood Culture, Routine No Growth to date     Blood Culture, Routine No Growth to date    Narrative:       Please collect with AM labs    Blood culture x two cultures. Draw prior to antibiotics. [[id number]] Collected:  04/20/19 1711    Order Status:  Completed Specimen:  Blood from Peripheral, Antecubital, Right Updated:  04/23/19 0910     Blood Culture, Routine Gram stain aer bottle: Gram negative rods     Blood Culture, Routine Gram stain flaco bottle: Gram negative rods      Blood Culture, Routine Results called to and read back by: Pancho Mras RN  04/21/2019  03:18     Blood Culture, Routine --     ESCHERICHIA COLI  For susceptibility see order #[id number]      Narrative:       Aerobic and anaerobic    Blood culture x two cultures. Draw prior to antibiotics. [[id number]]  (Susceptibility) Collected:  04/20/19 1705    Order Status:  Completed Specimen:  Blood from Peripheral, Antecubital, Right Updated:  04/23/19 0910     Blood Culture, Routine Gram stain aer bottle: Gram negative rods     Blood Culture, Routine Gram stain flaco bottle: Gram negative rods      Blood Culture, Routine Positive results previously called 04/21/2019  06:36     Blood Culture, Routine ESCHERICHIA COLI    Narrative:       Aerobic and anaerobic    Urine culture [[id number]] Collected:  04/21/19 2256    Order Status:  Completed Specimen:  Urine Updated:  04/23/19 0648     Urine Culture, Routine No significant growth    Narrative:       Preferred Collection Type->Urine, Clean Catch  YELLOW AND GREY RECEIVED    Urine culture [[id number]]  (Susceptibility) Collected:  04/20/19 1711    Order Status:  Completed Specimen:  Urine Updated:  04/22/19 1935     Urine Culture, Routine --     ESCHERICHIA COLI  >100,000 cfu/ml      Narrative:       Preferred Collection Type->Urine, Clean Catch    Urine culture [[id number]] Collected:   04/21/19 0640    Order Status:  Completed Specimen:  Kidney, left Updated:  04/22/19 1205     Urine Culture, Routine No growth    Narrative:       Urine sample from left nephostomy  WAS TOLD BY [employer] TECH TO RE-ORDER AS URINE CULTURE 04/21/2019    06:52      Clostridium difficile EIA [[id number]] Collected:  04/22/19 0812    Order Status:  Completed Specimen:  Stool Updated:  04/22/19 1138     C. diff Antigen Negative     C difficile Toxins A+B, EIA Negative     Comment: Testing not recommended for children <24 months old.       Blood culture [[id number]]     Order Status:  Canceled Specimen:  Blood     Gram stain [[id number]] Collected:  04/20/19 1711    Order Status:  Completed Specimen:  Urine from Catheterized Updated:  04/21/19 1328     Gram Stain Result Rare WBC's      Few Gram negative rods    Narrative:       add on GRAM #[id number] per Dr. Higuera @  04/21/2019  11:07     Gram stain [[id number]]     Order Status:  Completed Specimen:  Urine, Catheterized     Culture, Body Fluid - Bactec [[id number]] Collected:  04/21/19 0641    Order Status:  Canceled Specimen:  Body Fluid from Bile Updated:  04/21/19 0643    Culture, Body Fluid - Bactec [[id number]] Collected:  04/21/19 0641    Order Status:  Canceled Specimen:  Body Fluid from Bile Updated:  04/21/19 0643    Culture, Anaerobe [[id number]] Collected:  04/21/19 0640    Order Status:  Canceled Specimen:  Body Fluid from Back Updated:  04/21/19 0642    Aerobic culture [[id number]] Collected:  04/21/19 0640    Order Status:  Canceled Specimen:  Kidney, left from Back Updated:  04/21/19 0642    Influenza A & B by Molecular [[id number]] Collected:  04/20/19 1713    Order Status:  Completed Specimen:  Nasopharyngeal Swab Updated:  04/20/19 1804     Influenza A, Molecular Negative     Influenza B, Molecular Negative     Flu A & B Source Nasal swab        CRP:   No results for input(s): CRP in the last 48 hours.  ESR: No results for input(s): POCESR, ERYTHROCYTES in  the last 48 hours.    Significant Diagnostics:  I have reviewed all pertinent imaging results/findings within the past 24 hours.          Assessment/Plan:     Sepsis  59F with recent L5-S1 OLIF with intraoperative ureteral injury, repaired by urology intraoperatively, with stent placement that presents with AMS, fevers, concern for sepsis. Urinalysis with innumerable WBCs and bacteria     Neuro stable.  Wean sedation for neuro checks.   CV- goal normotension.    Pulm- intubated, WTE when safe.   FENGI- goal eunatremia.   Heme/ID- recommendation for sepsis treatment. Abx changed to ertapenem per ID  Renal- f/u urology recommendations    Dispo- will follow closely.         Francisco Malagon MD  Neurosurgery  [facility]

## 2019-04-24 NOTE — SUBJECTIVE & OBJECTIVE
Interval History:   HDS off pressors. Remains on the vent failed SBT yesterday. MADHURI consistent with serum creatinine.    Review of Systems  Objective:     Temp:  [98.8 °F (37.1 °C)-99.1 °F (37.3 °C)] 99 °F (37.2 °C)  Pulse:  [70-93] 93  SpO2:  [97 %-100 %] 98 %  BP: ()/(50-71) 129/70     Body mass index is 27.46 kg/m².    Date 04/24/19 0700 - 04/25/19 0659   Shift 1656-6987 8900-5069 0864-0488 24 Hour Total   INTAKE   NG/GT 10   10   Shift Total(mL/kg) 10(0.1)   10(0.1)   OUTPUT   Urine(mL/kg/hr) 150   150   Drains 18   18   Stool 200   200   Shift Total(mL/kg) 368(5.1)   368(5.1)   Weight (kg) 72.6 72.6 72.6 72.6          Drains     Drain                 Closed/Suction Drain 04/21/19 0300 LLQ 3 days         NG/OG Tube 04/21/19 0728 Center mouth 3 days         Nephrostomy 04/21/19 0629 Left 8 Fr. 3 days         Urethral Catheter 04/20/19 1711 Latex 16 Fr. 3 days         Rectal Tube 04/21/19 2100 rectal tube w/ balloon (indicate number of mLs) 2 days              Physical Exam   Constitutional: She appears well-developed and well-nourished. She is sedated and intubated.   HENT:   Head: Normocephalic and atraumatic.   Neck: No JVD present.   Cardiovascular: Normal rate and regular rhythm.    Pulmonary/Chest: She is intubated.   mechanically ventilated   Abdominal: Soft. She exhibits distension. There is no rebound and no guarding.   Dressing c/d/i  MADHURI drain with minimal SS output   Genitourinary:   Genitourinary Comments: Fontenot draining clear yellow  Left neph tube with clear pink urine   Neurological:   Sedated   Skin: She is not diaphoretic. No pallor.       Significant Labs:    BMP:  Recent Labs   Lab 04/22/19  1701 04/23/19  0400 04/24/19  0420    146* 148*   K 3.4* 3.9 4.0   * 115* 117*   CO2 21* 21* 18*   BUN 43* 43* 41*   CREATININE 1.9* 1.9* 1.5*   CALCIUM 8.0* 8.2* 8.5*       CBC:   Recent Labs   Lab 04/22/19  1207 04/23/19  0400 04/24/19  0420   WBC 9.73 10.59 12.49   HGB 8.2* 8.0* 8.3*    HCT 24.1* 24.5* 26.4*   PLT 86* 101* 104*       All pertinent labs results from the past 24 hours have been reviewed.    Significant Imaging:  All pertinent imaging results/findings from the past 24 hours have been reviewed.

## 2019-04-24 NOTE — PHYSICIAN QUERY
PT Name: Mariann Huff  MR #: [id number]     CDS/: Liliane Durham RN             Contact information: [phone]  This form is a permanent document in the medical record.     Query Date: April 24, 2019    Physician Query - Neurological Condition Clarification    By submitting this query, we are merely seeking further clarification of documentation to reflect the severity of illness of your patient. Please utilize your independent clinical judgment when addressing the question(s) below.    The Medical record reflects the following:     Indicators   Supporting Clinical Findings Location in Medical Record   x AMS, Confusion,  LOC, etc.  CC: AMS   complaint of altered mental status. She was brought in via EMS. EMS personnel received the call after her family  noticed that she was febrile and altered. She had a fusion of left lumbar spine by anterior approach last week   4/20 ED MD note   x Acute / Chronic Illness Encephalopathy- likley in setting of infection.   Admitted to MICU 4/20/19 for sepsis possibly 2/2 abscess and UTI     Septic shock  Metabolic acidosis  papa  Post operative anemia  PAPA likely 2/2 ischemic ATN  Acute respiratory failure   4/20 HP      4/22 CC MD      4/22 Renal MD    4/22 CC MD    Radiology Findings     x Electrolyte Imbalance Ca= 7.1, 7.9  Na= 146  K= 3.4 4/20-23 Lab   x Medication NS 5641mL Bolus  Vasopressin Gtt  Norepinephrine gtt  Piperacillin IVPB  NS 200cc/hr  Albunin 25gm IVPB  Ertapenem IVPB  LR 2500cc/hr  Vancomycin IVPB   4/20-21 MAR      4/22 MAR  4/21-23 MAR  4/22 MAR    4/21-22 MAR  4/20 MAR     x Treatment         Mechanical ventilation  Continuous oxygen  Cardiac monitoring  Blood culture  Urine culture  ID consult  Renal consult  CC consult  Daily CBC, CMP  CXR         1. Exploratory laparotomy    2. Ligation of left ureter    3. Removal of left JJ ureteral stent         4/21 NSG orders                          4/21 OP note   x Other Bun/cr= 60/ 3.4   GFR= 16.2    ESCHERICHIA COLI          4/21 Lab  4/20 Urine culture     Encephalopathy- is a general term for any diffuse disease of the brain that alters brain function or structure. Treatment of the cognitive dysfunction varies but is ultimately dependent on the treatment of the underlying condition.    Major Symptoms of Encephalopathy - Decreased level of consciousness, fluctuating alertness/concentration, confusion, agitation, lethargy, somnolence, drowsiness, obtundation, stupor, or coma.         References: National Institutes of Healths (NIH) National [place] of Neurological Disorders and Strokes;  HCPro 2016; Advisory Board     Clinical Guidelines:   These guidelines will set system standards to assist providers in managing, documentation, and coding of encephalopathy. The intent of this document is to serve as a system guideline, not replace the providers clinical judgment:  Provider, please specify the diagnosis or diagnoses associated with above clinical findings.  [ x  ] Metabolic Encephalopathy - Due to electrolye imbalance, metabolic derangements, or infections processes, includes Septic Encephalopathy   [   ] Other Encephalopathy - Includes uremic encephalopathy   [   ] Unspecified Encephalopathy      [   ] Other Neurological Condition-  Includes Post-ictal altered mental status. (please specify condition): _________   [   ]  unable to determine     Please document in your progress notes daily for the duration of treatment until resolved, and include in your discharge summary.

## 2019-04-24 NOTE — PROGRESS NOTES
[facility]  Infectious Disease  Progress Note    Patient Name: Mariann Huff  MRN: [id number]  Admission Date: 4/20/2019  Length of Stay: 4 days  Attending Physician: Paul Carlson MD  Primary Care Provider: Jasbir Haney MD    Isolation Status: No active isolations  Assessment/Plan:      Sepsis  58 y/o female with HTN, DMII, CAD, NSTEMI, s/p L5-S1 OLIF with NSGY 4/12 c/b intraoperative left ureteral injury and underwent ureteroureteral anastomoses and ureteral stent placement. She was discharged with a correa and MADHURI drain that was removed on 4/16. She was seen by urology and anticipated stent removal in 2-3 months (6/2019).  She presents to ED with AMS and sepsis found to have air and fluid collection of left anterior prevertebral soft tissue with left ureter involvement. She underwent ex-lap and washout on 4/21. She is found to have E.coli bacteremia. We are consulted for abx recommendations.      Per op note,  There were pocket of purulence noted and evacuated, sent for culture of left retroperitoneum to pole of left kidney. The stent was visible. Posterior to the stent there was a pocket of purulent fluid and exposed hardware along the spinal vertebrae. The tissue along the distal and proximal end of ureter were friable and unhealthy. She underwent left nephrostomy tube placement. The stent was removed and several metal clips were placed on proximal end of the ureteral.     Recommendations:  1. Continue ceftriaxone [id number]. Added rifampin 300 mg q12hr IV (transition to oral once able) given hardware in place.   2. Repeat blood cultures NGTD.  3. Surgical cultures Staph lugdenesis.  Continue therapy for now. Follow susceptibles. Will tailor abx accordingly.   4. Contact isolation status.   5. Anticipate 6 weeks of IV abx followed by oral abx suppression given retained hardware.   6. Discussed with ID staff. ID will follow with you       Thank you for your consult. I will follow-up with  patient. Please contact us if you have any additional questions.    August Mars PA-C  Infectious Disease  [facility]  [phone]    Subjective:     Principal Problem:Ureteral transection of left native kidney    HPI: 60 y/o female with HTN, DMII, CAD, NSTEMI, s/p L5-S1 OLIF with NSGY 4/12 sustained intraoperative laceration and underwent ureteroureteral anastomoses and ureteral stent placement. She was discharged with a correa and MADHURI drain that was removed on 4/16. She was seen by urology and anticipated stent removal in 2-3 months (6/2019).  She presents to ED with AMS and sepsis. She was febrile 103 in ED. WBC 5K on admit. Lactate 4.6. Cath UA with 3+leukocytes, >100 WBcs and many bacteria.     MRI: Postsurgical change of L5-S1 posterior spinal fusion and anterior interbody fusion with a 4.4 cm fluid fluid collection in the left anterior prevertebral soft tissues at the level of the L5-S1 disc space.  Findings may represent postoperative seroma or evolving hematoma however, urinoma not excluded.  No definite abscess although correlation is advised.    Irregular flow void involving the right common iliac vein, underlying thrombus not excluded.      CT: air collection within the anterior paraspinous soft tissues through which the left ureter courses. Given that the ureter courses through this, communication of the ureter with this collection is not excluded.  There is a ureteral stent within the left ureter.Punctate foci of air in L5-S1.  2. Air within the left renal collecting system, along the left ureter, and within the urinary bladder, correlation advised.    This was not amendable for drainage by IR so she was taken to OR for washout.     She underwent ex-lap of left ureter and left nephrostomy tube placement 4/21/19.   Per op note,  There were pocket of purulence noted and evacuated, sent for culture of left retroperitoneum to pole of left kidney. The stent was visible. Posterior to the  stent there was a pocket of purulent fluid and exposed hardware along the spinal vertebrae. The tissue along the distal and proximal end of ureter were friable and unhealthy. She underwent left nephrostomy tube placement. The stent was removed and several metal clips were placed on proximal end of the ureteral. MADHURI drain was placed into left retroperitoneal.     Blood cultures here are positive for GNR. Repeat blood culturse are positive.   Urine cultures +E.coli  She is currently on zosyn. She is in the ICU, intubated, sedated  Interval History:   Seen with  at bedside  Blood cultures repeated today  Blood cultures 4/23 NGTD  Blood cultures +4/20-4/21E.coli  Surgical cultures +Staph lugdensis   Afebrile. Stable.     Review of Systems   Unable to perform ROS: Intubated     Objective:     Vital Signs (Most Recent):  Temp: 99 °F (37.2 °C) (04/24/19 0700)  Pulse: 71 (04/24/19 1000)  Resp: (!) 4 (04/23/19 0731)  BP: 128/61 (04/24/19 1000)  SpO2: 98 % (04/24/19 1000) Vital Signs (24h Range):  Temp:  [98.8 °F (37.1 °C)-99.1 °F (37.3 °C)] 99 °F (37.2 °C)  Pulse:  [70-93] 71  SpO2:  [95 %-100 %] 98 %  BP: ()/(54-72) 128/61     Weight: 72.6 kg (160 lb)  Body mass index is 27.46 kg/m².    Estimated Creatinine Clearance: 39.9 mL/min (A) (based on SCr of 1.5 mg/dL (H)).    Physical Exam   Constitutional: She appears well-developed and well-nourished. No distress.       intubated   HENT:   Head: Normocephalic.   Cardiovascular: Normal rate, regular rhythm and normal heart sounds.   No murmur heard.  Pulmonary/Chest: Effort normal. No stridor. No respiratory distress.   Abdominal: Soft. She exhibits no distension.   Rectal tube with watery stool output  Nephrostomy drain in place   Musculoskeletal: She exhibits no edema or deformity.   Skin: Skin is warm and dry. She is not diaphoretic. No erythema. No pallor.   Psychiatric: She has a normal mood and affect.   Vitals reviewed.      Significant Labs: All pertinent labs  within the past 24 hours have been reviewed.    Significant Imaging: I have reviewed all pertinent imaging results/findings within the past 24 hours.

## 2019-04-25 LAB
ALBUMIN SERPL BCP-MCNC: 1.6 G/DL (ref 3.5–5.2)
ALP SERPL-CCNC: 210 U/L (ref 55–135)
ALT SERPL W/O P-5'-P-CCNC: 258 U/L (ref 10–44)
ANION GAP SERPL CALC-SCNC: 13 MMOL/L (ref 8–16)
ANISOCYTOSIS BLD QL SMEAR: SLIGHT
AST SERPL-CCNC: 115 U/L (ref 10–40)
BACTERIA #/AREA URNS AUTO: ABNORMAL /HPF
BACTERIA SPEC ANAEROBE CULT: NORMAL
BASOPHILS # BLD AUTO: ABNORMAL K/UL (ref 0–0.2)
BASOPHILS NFR BLD: 0 % (ref 0–1.9)
BILIRUB SERPL-MCNC: 7.9 MG/DL (ref 0.1–1)
BILIRUB UR QL STRIP: NEGATIVE
BUN SERPL-MCNC: 38 MG/DL (ref 6–20)
CALCIUM SERPL-MCNC: 8.8 MG/DL (ref 8.7–10.5)
CHLORIDE SERPL-SCNC: 115 MMOL/L (ref 95–110)
CLARITY UR REFRACT.AUTO: ABNORMAL
CO2 SERPL-SCNC: 18 MMOL/L (ref 23–29)
COLOR UR AUTO: ABNORMAL
CREAT SERPL-MCNC: 1.1 MG/DL (ref 0.5–1.4)
DIFFERENTIAL METHOD: ABNORMAL
EOSINOPHIL # BLD AUTO: ABNORMAL K/UL (ref 0–0.5)
EOSINOPHIL NFR BLD: 2 % (ref 0–8)
ERYTHROCYTE [DISTWIDTH] IN BLOOD BY AUTOMATED COUNT: 14.8 % (ref 11.5–14.5)
EST. GFR  (AFRICAN AMERICAN): >60 ML/MIN/1.73 M^2
EST. GFR  (NON AFRICAN AMERICAN): 55.5 ML/MIN/1.73 M^2
GLUCOSE SERPL-MCNC: 187 MG/DL (ref 70–110)
GLUCOSE UR QL STRIP: ABNORMAL
HCT VFR BLD AUTO: 27.8 % (ref 37–48.5)
HGB BLD-MCNC: 8.6 G/DL (ref 12–16)
HGB UR QL STRIP: ABNORMAL
HYALINE CASTS UR QL AUTO: 0 /LPF
HYPOCHROMIA BLD QL SMEAR: ABNORMAL
IMM GRANULOCYTES # BLD AUTO: ABNORMAL K/UL (ref 0–0.04)
IMM GRANULOCYTES NFR BLD AUTO: ABNORMAL % (ref 0–0.5)
INR PPP: 0.9 (ref 0.8–1.2)
KETONES UR QL STRIP: ABNORMAL
LEUKOCYTE ESTERASE UR QL STRIP: ABNORMAL
LYMPHOCYTES # BLD AUTO: ABNORMAL K/UL (ref 1–4.8)
LYMPHOCYTES NFR BLD: 2 % (ref 18–48)
MAGNESIUM SERPL-MCNC: 2.5 MG/DL (ref 1.6–2.6)
MCH RBC QN AUTO: 27.2 PG (ref 27–31)
MCHC RBC AUTO-ENTMCNC: 30.9 G/DL (ref 32–36)
MCV RBC AUTO: 88 FL (ref 82–98)
MICROSCOPIC COMMENT: ABNORMAL
MONOCYTES # BLD AUTO: ABNORMAL K/UL (ref 0.3–1)
MONOCYTES NFR BLD: 1 % (ref 4–15)
MYELOCYTES NFR BLD MANUAL: 2 %
NEUTROPHILS NFR BLD: 93 % (ref 38–73)
NITRITE UR QL STRIP: NEGATIVE
NRBC BLD-RTO: 1 /100 WBC
OVALOCYTES BLD QL SMEAR: ABNORMAL
PH UR STRIP: 5 [PH] (ref 5–8)
PHOSPHATE SERPL-MCNC: 3 MG/DL (ref 2.7–4.5)
PLATELET # BLD AUTO: 104 K/UL (ref 150–350)
PMV BLD AUTO: 12.4 FL (ref 9.2–12.9)
POCT GLUCOSE: 199 MG/DL (ref 70–110)
POCT GLUCOSE: 202 MG/DL (ref 70–110)
POCT GLUCOSE: 207 MG/DL (ref 70–110)
POCT GLUCOSE: 223 MG/DL (ref 70–110)
POCT GLUCOSE: 240 MG/DL (ref 70–110)
POIKILOCYTOSIS BLD QL SMEAR: SLIGHT
POLYCHROMASIA BLD QL SMEAR: ABNORMAL
POTASSIUM SERPL-SCNC: 4.2 MMOL/L (ref 3.5–5.1)
PROT SERPL-MCNC: 5.6 G/DL (ref 6–8.4)
PROT UR QL STRIP: ABNORMAL
PROTHROMBIN TIME: 9.9 SEC (ref 9–12.5)
RBC # BLD AUTO: 3.16 M/UL (ref 4–5.4)
RBC #/AREA URNS AUTO: 25 /HPF (ref 0–4)
SODIUM SERPL-SCNC: 146 MMOL/L (ref 136–145)
SP GR UR STRIP: 1.02 (ref 1–1.03)
SQUAMOUS #/AREA URNS AUTO: 1 /HPF
URN SPEC COLLECT METH UR: ABNORMAL
WBC # BLD AUTO: 11.87 K/UL (ref 3.9–12.7)
WBC #/AREA URNS AUTO: 55 /HPF (ref 0–5)
WBC CLUMPS UR QL AUTO: ABNORMAL
YEAST UR QL AUTO: ABNORMAL

## 2019-04-25 PROCEDURE — 20000000 HC ICU ROOM

## 2019-04-25 PROCEDURE — 25000003 PHARM REV CODE 250: Performed by: PHYSICIAN ASSISTANT

## 2019-04-25 PROCEDURE — 63600175 PHARM REV CODE 636 W HCPCS: Performed by: ANESTHESIOLOGY

## 2019-04-25 PROCEDURE — 63600175 PHARM REV CODE 636 W HCPCS

## 2019-04-25 PROCEDURE — 99291 PR CRITICAL CARE, E/M 30-74 MINUTES: ICD-10-PCS | Mod: ,,, | Performed by: SURGERY

## 2019-04-25 PROCEDURE — 27000221 HC OXYGEN, UP TO 24 HOURS

## 2019-04-25 PROCEDURE — 63600175 PHARM REV CODE 636 W HCPCS: Performed by: STUDENT IN AN ORGANIZED HEALTH CARE EDUCATION/TRAINING PROGRAM

## 2019-04-25 PROCEDURE — 94761 N-INVAS EAR/PLS OXIMETRY MLT: CPT

## 2019-04-25 PROCEDURE — 85027 COMPLETE CBC AUTOMATED: CPT

## 2019-04-25 PROCEDURE — 25000003 PHARM REV CODE 250: Performed by: STUDENT IN AN ORGANIZED HEALTH CARE EDUCATION/TRAINING PROGRAM

## 2019-04-25 PROCEDURE — 99233 PR SUBSEQUENT HOSPITAL CARE,LEVL III: ICD-10-PCS | Mod: ICN,,, | Performed by: PHYSICIAN ASSISTANT

## 2019-04-25 PROCEDURE — 63600175 PHARM REV CODE 636 W HCPCS: Performed by: SURGERY

## 2019-04-25 PROCEDURE — 63600175 PHARM REV CODE 636 W HCPCS: Performed by: UROLOGY

## 2019-04-25 PROCEDURE — 63600175 PHARM REV CODE 636 W HCPCS: Performed by: PHYSICIAN ASSISTANT

## 2019-04-25 PROCEDURE — 99900035 HC TECH TIME PER 15 MIN (STAT)

## 2019-04-25 PROCEDURE — C9113 INJ PANTOPRAZOLE SODIUM, VIA: HCPCS | Performed by: ANESTHESIOLOGY

## 2019-04-25 PROCEDURE — 99291 CRITICAL CARE FIRST HOUR: CPT | Mod: ,,, | Performed by: SURGERY

## 2019-04-25 PROCEDURE — 99233 SBSQ HOSP IP/OBS HIGH 50: CPT | Mod: ICN,,, | Performed by: PHYSICIAN ASSISTANT

## 2019-04-25 PROCEDURE — 83735 ASSAY OF MAGNESIUM: CPT

## 2019-04-25 PROCEDURE — 85610 PROTHROMBIN TIME: CPT

## 2019-04-25 PROCEDURE — 99900026 HC AIRWAY MAINTENANCE (STAT)

## 2019-04-25 PROCEDURE — 84100 ASSAY OF PHOSPHORUS: CPT

## 2019-04-25 PROCEDURE — 27200966 HC CLOSED SUCTION SYSTEM

## 2019-04-25 PROCEDURE — 87086 URINE CULTURE/COLONY COUNT: CPT

## 2019-04-25 PROCEDURE — 25000003 PHARM REV CODE 250: Performed by: ANESTHESIOLOGY

## 2019-04-25 PROCEDURE — 85007 BL SMEAR W/DIFF WBC COUNT: CPT

## 2019-04-25 PROCEDURE — 63600175 PHARM REV CODE 636 W HCPCS: Performed by: HOSPITALIST

## 2019-04-25 PROCEDURE — 94003 VENT MGMT INPAT SUBQ DAY: CPT

## 2019-04-25 PROCEDURE — 81001 URINALYSIS AUTO W/SCOPE: CPT

## 2019-04-25 PROCEDURE — 80053 COMPREHEN METABOLIC PANEL: CPT

## 2019-04-25 RX ORDER — FUROSEMIDE 10 MG/ML
INJECTION INTRAMUSCULAR; INTRAVENOUS
Status: COMPLETED
Start: 2019-04-25 | End: 2019-04-25

## 2019-04-25 RX ORDER — GLUCAGON 1 MG
1 KIT INJECTION
Status: DISCONTINUED | OUTPATIENT
Start: 2019-04-25 | End: 2019-04-26

## 2019-04-25 RX ORDER — FUROSEMIDE 10 MG/ML
40 INJECTION INTRAMUSCULAR; INTRAVENOUS ONCE
Status: DISCONTINUED | OUTPATIENT
Start: 2019-04-25 | End: 2019-04-29

## 2019-04-25 RX ORDER — INSULIN ASPART 100 [IU]/ML
0-5 INJECTION, SOLUTION INTRAVENOUS; SUBCUTANEOUS EVERY 4 HOURS PRN
Status: DISCONTINUED | OUTPATIENT
Start: 2019-04-25 | End: 2019-04-26

## 2019-04-25 RX ORDER — HEPARIN SODIUM 5000 [USP'U]/ML
5000 INJECTION, SOLUTION INTRAVENOUS; SUBCUTANEOUS EVERY 8 HOURS
Status: DISCONTINUED | OUTPATIENT
Start: 2019-04-25 | End: 2019-04-30

## 2019-04-25 RX ORDER — FUROSEMIDE 10 MG/ML
40 INJECTION INTRAMUSCULAR; INTRAVENOUS ONCE
Status: COMPLETED | OUTPATIENT
Start: 2019-04-25 | End: 2019-04-25

## 2019-04-25 RX ADMIN — RIFAMPIN 300 MG: 600 INJECTION, POWDER, LYOPHILIZED, FOR SOLUTION INTRAVENOUS at 02:04

## 2019-04-25 RX ADMIN — CHLORHEXIDINE GLUCONATE 0.12% ORAL RINSE 15 ML: 1.2 LIQUID ORAL at 09:04

## 2019-04-25 RX ADMIN — CEFTRIAXONE 2 G: 2 INJECTION, SOLUTION INTRAVENOUS at 04:04

## 2019-04-25 RX ADMIN — HEPARIN SODIUM 5000 UNITS: 5000 INJECTION, SOLUTION INTRAVENOUS; SUBCUTANEOUS at 05:04

## 2019-04-25 RX ADMIN — FENTANYL CITRATE 50 MCG: 50 INJECTION INTRAMUSCULAR; INTRAVENOUS at 09:04

## 2019-04-25 RX ADMIN — INSULIN ASPART 2 UNITS: 100 INJECTION, SOLUTION INTRAVENOUS; SUBCUTANEOUS at 05:04

## 2019-04-25 RX ADMIN — INSULIN ASPART 2 UNITS: 100 INJECTION, SOLUTION INTRAVENOUS; SUBCUTANEOUS at 11:04

## 2019-04-25 RX ADMIN — HEPARIN SODIUM 5000 UNITS: 5000 INJECTION, SOLUTION INTRAVENOUS; SUBCUTANEOUS at 01:04

## 2019-04-25 RX ADMIN — FUROSEMIDE 40 MG: 10 INJECTION, SOLUTION INTRAMUSCULAR; INTRAVENOUS at 08:04

## 2019-04-25 RX ADMIN — DEXMEDETOMIDINE HYDROCHLORIDE 0.8 MCG/KG/HR: 400 INJECTION INTRAVENOUS at 02:04

## 2019-04-25 RX ADMIN — HEPARIN SODIUM 5000 UNITS: 5000 INJECTION, SOLUTION INTRAVENOUS; SUBCUTANEOUS at 11:04

## 2019-04-25 RX ADMIN — FENTANYL CITRATE 50 MCG: 50 INJECTION INTRAMUSCULAR; INTRAVENOUS at 06:04

## 2019-04-25 RX ADMIN — RIFAMPIN 300 MG: 600 INJECTION, POWDER, LYOPHILIZED, FOR SOLUTION INTRAVENOUS at 01:04

## 2019-04-25 RX ADMIN — FENTANYL CITRATE 50 MCG: 50 INJECTION INTRAMUSCULAR; INTRAVENOUS at 05:04

## 2019-04-25 RX ADMIN — FUROSEMIDE 40 MG: 10 INJECTION, SOLUTION INTRAMUSCULAR; INTRAVENOUS at 06:04

## 2019-04-25 RX ADMIN — PANTOPRAZOLE SODIUM 40 MG: 40 INJECTION, POWDER, LYOPHILIZED, FOR SOLUTION INTRAVENOUS at 08:04

## 2019-04-25 RX ADMIN — FENTANYL CITRATE 50 MCG: 50 INJECTION INTRAMUSCULAR; INTRAVENOUS at 04:04

## 2019-04-25 RX ADMIN — CHLORHEXIDINE GLUCONATE 0.12% ORAL RINSE 15 ML: 1.2 LIQUID ORAL at 08:04

## 2019-04-25 NOTE — PROGRESS NOTES
Pt was placed on PAV+ around 1120; % supp 70, PEEP 5, and FiO2 40%. Pt only tolerated for about an hr. Pt RR 37 and VTe 223 at most. Placed back on original settings VC+, rate 10,  PEEP 5, and FiO2 40. Will continue to monitor pt.

## 2019-04-25 NOTE — PLAN OF CARE
Problem: Adult Inpatient Plan of Care  Goal: Plan of Care Review  No acute events throughout shift, VS and assessment per flow sheet, patient progressing towards goals as tolerated, plan of care reviewed with Mariann Huff and family, all concerns addressed, will continue to monitor.    Pt was diuresed and put out 100-350 per hour. SBT trial failed because she wan't pulling large enough volumes and rate increased to 40.

## 2019-04-25 NOTE — PLAN OF CARE
Reviewed BG levels and insulin regimen.      Goal BG while inpatient: 140-180 mg/dl  Current BG levels are trend up above goal  Still intubated    Recommendations:   - recommend stopping D5 1/2 NS drip, called SICU team, they will D/C  - on trickle tube feeding   - change glucose to Q4hr with Q4hr correction scale      We will follow with you, call with any questions    Charly Kim MD  Endocrine Fellow  4/25/2019

## 2019-04-25 NOTE — PLAN OF CARE
04/25/19 1538   Discharge Assessment   Assessment Type Discharge Planning Reassessment   Discharge Plan A   (TBD)   Discharge Plan B Long-term acute care facility (LTAC)   DME Needed Upon Discharge  walker, rolling;shower chair;medication pump  (Pt will need IV ABX x 6 weeks from date of first negative Blood cultures 4/23-6/4/2019 )   Patient/Family in Agreement with Plan yes     The patient remains in ICU intubated/sedated w/precedex. ID following to help with discharge planning for IV ABX. Recs for 6 weeks of IV ABX form first negative blood culture on 4/23/2019. Ceftriaxone 2 grams Q 24 hrs IV and rifampin 300 mg Q 12 hr IV end date is 6/4/2019. She will need a PICC placed prior to discharge. She will need home health and an infusion agency to provide the IV ABX. Weekly labs to be sent to ID. The patient remains intubated at this time and discharge dispo is TBD, PT/OT to be consulted once patient is able to participate in therapy. LTAC vs SNF vs HH at this time. Will continue to monitor and help with discharge planning throughout admission.

## 2019-04-25 NOTE — PROGRESS NOTES
[facility]  Infectious Disease  Progress Note    Patient Name: Mariann Huff  MRN: [id number]  Admission Date: 4/20/2019  Length of Stay: 5 days  Attending Physician: Paul Carlson MD  Primary Care Provider: Jasbir Haney MD    Isolation Status: No active isolations  Assessment/Plan:      Sepsis  58 y/o female with HTN, DMII, CAD, NSTEMI, s/p L5-S1 OLIF with NSGY 4/12 c/b intraoperative left ureteral injury and underwent ureteroureteral anastomoses and ureteral stent placement. She was discharged with a correa and MADHURI drain that was removed on 4/16. She was seen by urology and anticipated stent removal in 2-3 months (6/2019).  She presents to ED with AMS and E.coli septicemia found to have air and fluid collection of left anterior prevertebral soft tissue with left ureter involvement. She underwent ex-lap and washout on 4/21. We are consulted for abx recommendations.      Per op note,  There were pocket of purulence noted and evacuated, sent for culture of left retroperitoneum to pole of left kidney. The stent was visible. Posterior to the stent there was a pocket of purulent fluid and exposed hardware along the spinal vertebrae. The tissue along the distal and proximal end of ureter were friable and unhealthy. She underwent left nephrostomy tube placement. The stent was removed and several metal clips were placed on proximal end of the ureteral.     Recommendations:  1. Continue ceftriaxone [id number]. Added rifampin 300 mg q12hr IV (transition to oral once able) given hardware in place.   2. Repeat blood cultures NGTD.  3. Surgical cultures Staph lugdenesis pansensitive.  4. Contact isolation status.   5. Anticipate 6 weeks of IV abx from first negative blood cultures (4/23-6/4/19)  followed by oral abx suppression given retained hardware.   6. Will need weekly cbc cmp esr crp sent to ID clinic at .  Discussed with ID staff. ID will sign off.          Thank you for your consult. I will  sign off. Please contact us if you have any additional questions.    August Mars PA-C  Infectious Disease  [facility]  [phone]    Subjective:     Principal Problem:Ureteral transection of left native kidney    HPI: 60 y/o female with HTN, DMII, CAD, NSTEMI, s/p L5-S1 OLIF with NSGY 4/12 sustained intraoperative laceration and underwent ureteroureteral anastomoses and ureteral stent placement. She was discharged with a correa and MADHURI drain that was removed on 4/16. She was seen by urology and anticipated stent removal in 2-3 months (6/2019).  She presents to ED with AMS and sepsis. She was febrile 103 in ED. WBC 5K on admit. Lactate 4.6. Cath UA with 3+leukocytes, >100 WBcs and many bacteria.     MRI: Postsurgical change of L5-S1 posterior spinal fusion and anterior interbody fusion with a 4.4 cm fluid fluid collection in the left anterior prevertebral soft tissues at the level of the L5-S1 disc space.  Findings may represent postoperative seroma or evolving hematoma however, urinoma not excluded.  No definite abscess although correlation is advised.    Irregular flow void involving the right common iliac vein, underlying thrombus not excluded.      CT: air collection within the anterior paraspinous soft tissues through which the left ureter courses. Given that the ureter courses through this, communication of the ureter with this collection is not excluded.  There is a ureteral stent within the left ureter.Punctate foci of air in L5-S1.  2. Air within the left renal collecting system, along the left ureter, and within the urinary bladder, correlation advised.    This was not amendable for drainage by IR so she was taken to OR for washout.     She underwent ex-lap of left ureter and left nephrostomy tube placement 4/21/19.   Per op note,  There were pocket of purulence noted and evacuated, sent for culture of left retroperitoneum to pole of left kidney. The stent was visible. Posterior to the  stent there was a pocket of purulent fluid and exposed hardware along the spinal vertebrae. The tissue along the distal and proximal end of ureter were friable and unhealthy. She underwent left nephrostomy tube placement. The stent was removed and several metal clips were placed on proximal end of the ureteral. MADHURI drain was placed into left retroperitoneal.     Blood cultures here are positive for GNR. Repeat blood culturse are positive.   Urine cultures +E.coli  She is currently on zosyn. She is in the ICU, intubated, sedated  Interval History:   NAEON  CXR with worsening bilateral airspace consolidation - on lasix   No fever chills or sweats  Surgical cultures +Staph lugdenesis pansensitive       Review of Systems   Unable to perform ROS: Intubated     Objective:     Vital Signs (Most Recent):  Temp: 98 °F (36.7 °C) (04/25/19 0700)  Pulse: 66 (04/25/19 0905)  Resp: (!) 23 (04/25/19 0301)  BP: (!) 151/72 (04/25/19 0905)  SpO2: 100 % (04/25/19 0905) Vital Signs (24h Range):  Temp:  [98 °F (36.7 °C)-99.4 °F (37.4 °C)] 98 °F (36.7 °C)  Pulse:  [63-71] 66  Resp:  [20-23] 23  SpO2:  [95 %-100 %] 100 %  BP: (114-175)/(55-82) 151/72     Weight: 72.6 kg (160 lb)  Body mass index is 27.46 kg/m².    Estimated Creatinine Clearance: 54.5 mL/min (based on SCr of 1.1 mg/dL).    Physical Exam   Constitutional: She appears well-developed and well-nourished. No distress.       intubated   HENT:   Head: Normocephalic.   Cardiovascular: Normal rate, regular rhythm and normal heart sounds.   No murmur heard.  Pulmonary/Chest: Effort normal. No stridor. No respiratory distress.   Abdominal: Soft. She exhibits no distension.   Rectal tube with watery stool output  Nephrostomy drain in place   Musculoskeletal: She exhibits no edema or deformity.   Neurological:   Aroused with verbal stimuli   Skin: Skin is warm and dry. She is not diaphoretic. No erythema. No pallor.   Psychiatric: She has a normal mood and affect.   Vitals  reviewed.      Significant Labs: All pertinent labs within the past 24 hours have been reviewed.    Significant Imaging: I have reviewed all pertinent imaging results/findings within the past 24 hours.

## 2019-04-25 NOTE — SUBJECTIVE & OBJECTIVE
Interval History:   No acute events. Remains HDS off pressors. On the vent failed SBT yesterday. Changed to precedex for sedation.    Review of Systems    Objective:     Temp:  [98 °F (36.7 °C)-99.4 °F (37.4 °C)] 98 °F (36.7 °C)  Pulse:  [63-75] 67  Resp:  [20-23] 23  SpO2:  [95 %-100 %] 100 %  BP: (114-175)/(55-82) 162/74     Body mass index is 27.46 kg/m².    Date 04/25/19 0700 - 04/26/19 0659   Shift 5998-7228 [id number] [id number] 24 Hour Total   INTAKE   I.V.(mL/kg) 129.8(1.8)   129.8(1.8)   NG/GT 20   20   Shift Total(mL/kg) 149.8(2.1)   149.8(2.1)   OUTPUT   Urine(mL/kg/hr) 425   425   Drains 60   60   Shift Total(mL/kg) 485(6.7)   485(6.7)   Weight (kg) 72.6 72.6 72.6 72.6          Drains     Drain                 Closed/Suction Drain 04/21/19 0300 LLQ 3 days         NG/OG Tube 04/21/19 0728 Center mouth 3 days         Nephrostomy 04/21/19 0629 Left 8 Fr. 3 days         Urethral Catheter 04/20/19 1711 Latex 16 Fr. 3 days         Rectal Tube 04/21/19 2100 rectal tube w/ balloon (indicate number of mLs) 2 days              Physical Exam   Constitutional: She appears well-developed and well-nourished. She is sedated and intubated.   HENT:   Head: Normocephalic and atraumatic.   Neck: No JVD present.   Cardiovascular: Normal rate and regular rhythm.    Pulmonary/Chest: She is intubated.   mechanically ventilated   Abdominal: Soft. She exhibits distension (improving). There is no rebound and no guarding.   Dressing c/d/i  MADHURI drain with minimal SS output   Genitourinary:   Genitourinary Comments: Fontenot draining clear yellow  Left neph tube with clear pink urine   Neurological:   Sedated   Skin: She is not diaphoretic. No pallor.       Significant Labs:    BMP:  Recent Labs   Lab 04/23/19  0400 04/24/19  0420 04/25/19  0300   * 148* 146*   K 3.9 4.0 4.2   * 117* 115*   CO2 21* 18* 18*   BUN 43* 41* 38*   CREATININE 1.9* 1.5* 1.1   CALCIUM 8.2* 8.5* 8.8       CBC:   Recent Labs   Lab 04/23/19  0400  04/24/19  0420 04/24/19  2208 04/25/19  0300   WBC 10.59 12.49  --  11.87   HGB 8.0* 8.3*  --  8.6*   HCT 24.5* 26.4* 26* 27.8*   * 104*  --  104*       All pertinent labs results from the past 24 hours have been reviewed.    Significant Imaging:  All pertinent imaging results/findings from the past 24 hours have been reviewed.

## 2019-04-25 NOTE — PROGRESS NOTES
[facility]  Critical Care - Surgery  Progress Note    Patient Name: Mariann Huff  MRN: [id number]  Admission Date: 4/20/2019  Hospital Length of Stay: 5 days  Code Status: Full Code  Attending Provider: Paul Carlson MD  Primary Care Provider: Jasbir Haney MD   Principal Problem: Ureteral transection of left native kidney    Subjective:     Hospital/ICU Course:  No notes on file    Interval History/Significant Events: NAEON. HDS, not on any pressor support. Remains intubated on precedex gtt.  Plan for SBT this AM.     Follow-up For: Procedure(s) (LRB):  Creation, Nephrostomy, Percutaneous (Left)    Post-Operative Day: 4 Days Post-Op    Objective:     Vital Signs (Most Recent):  Temp: 99.4 °F (37.4 °C) (04/25/19 0301)  Pulse: 67 (04/25/19 0723)  Resp: (!) 23 (04/25/19 0301)  BP: (!) 170/74 (04/25/19 0630)  SpO2: 97 % (04/25/19 0723) Vital Signs (24h Range):  Temp:  [99 °F (37.2 °C)-99.4 °F (37.4 °C)] 99.4 °F (37.4 °C)  Pulse:  [63-79] 67  Resp:  [20-23] 23  SpO2:  [95 %-100 %] 97 %  BP: (114-175)/(55-82) 170/74     Weight: 72.6 kg (160 lb)  Body mass index is 27.46 kg/m².      Intake/Output Summary (Last 24 hours) at 4/25/2019 0754  Last data filed at 4/25/2019 0600  Gross per 24 hour   Intake 2585 ml   Output 2029 ml   Net 556 ml       Physical Exam   Constitutional: She appears well-developed and well-nourished.   HENT:   Head: Normocephalic and atraumatic.   Intubated   Eyes: Right eye exhibits no discharge. Left eye exhibits no discharge.   Neck: Normal range of motion. Neck supple.   Cardiovascular: Normal rate and regular rhythm.   Pulmonary/Chest: Effort normal. No respiratory distress.   Intubated   Abdominal:   Midline incision c/d/i.  MADHURI drainage yellow.  Abdomen soft, non-distended. Rectal tube with watery output. OG tube in place.    Genitourinary:   Genitourinary Comments: Nephrostomy tube in place with watery yellow output.  Fontenot catheter+   Musculoskeletal: Normal range of  motion.   Neurological:   Sedated on precedex gtt   Skin: Skin is warm and dry.   Vitals reviewed.      Vents:  Vent Mode: A/C (04/25/19 0723)  Set Rate: 10 bmp (04/25/19 0723)  Vt Set: 350 mL (04/25/19 0723)  Pressure Support: 20 cmH20 (04/24/19 1449)  PEEP/CPAP: 5 cmH20 (04/25/19 0723)  Oxygen Concentration (%): 39 (04/25/19 0723)  Peak Airway Pressure: 29 cmH2O (04/25/19 0723)  Plateau Pressure: 35 cmH20 (04/25/19 0723)  Total Ve: 8.03 mL (04/25/19 0723)  F/VT Ratio<105 (RSBI): (!) 8.91 (04/23/19 0731)    Lines/Drains/Airways     Central Venous Catheter Line                 Percutaneous Central Line Insertion/Assessment - triple lumen  04/21/19 0100 right internal jugular 4 days          Drain                 Closed/Suction Drain 04/21/19 0300 LLQ 4 days         NG/OG Tube 04/21/19 0728 Center mouth 4 days         Nephrostomy 04/21/19 0629 Left 8 Fr. 4 days         Urethral Catheter 04/20/19 1711 Latex 16 Fr. 4 days         Rectal Tube 04/21/19 2100 rectal tube w/ balloon (indicate number of mLs) 3 days          Airway                 Airway - Non-Surgical 04/21/19 0029 Endotracheal Tube 4 days          Peripheral Intravenous Line                 Peripheral IV - Single Lumen 04/20/19 1650 18 G Left Antecubital 4 days         Peripheral IV - Single Lumen 04/20/19 1710 18 G Right Antecubital 4 days                Significant Labs:    CBC/Anemia Profile:  Recent Labs   Lab 04/24/19  0420 04/24/19  2208 04/25/19  0300   WBC 12.49  --  11.87   HGB 8.3*  --  8.6*   HCT 26.4* 26* 27.8*   *  --  104*   MCV 87  --  88   RDW 14.6*  --  14.8*        Chemistries:  Recent Labs   Lab 04/24/19  0420 04/25/19  0300   * 146*   K 4.0 4.2   * 115*   CO2 18* 18*   BUN 41* 38*   CREATININE 1.5* 1.1   CALCIUM 8.5* 8.8   ALBUMIN 1.6* 1.6*   PROT 5.3* 5.6*   BILITOT 6.7* 7.9*   ALKPHOS 161* 210*   * 258*   * 115*   MG 2.4 2.5   PHOS 2.1* 3.0       All pertinent labs within the past 24 hours have been  reviewed.    Significant Imaging:  I have reviewed and interpreted all pertinent imaging results/findings within the past 24 hours.    Assessment/Plan:     * Ureteral transection of left native kidney    ASSESSMENT/PLAN:   Mariann Huff is a 59 y.o. female s/p left ureteral injury on 4/12, who presented with fever, AMS, and intraabdominal abscess, taken for washout and ligation of left ureter with nephrostomy tube placement on 4/21.     Neuro:   - Sedated with precedex gtt.   - Pain control with fentanyl prn     Pulmonary:   - Intubated and sedated on precedex gtt.    - Daily CXR.  CXR 4/25 shows worsening airspace consolidation b/l.     - Lasix 40 IV given. Will continue to monitor.  - ABGs prn   - Failed SBT yesterday.  Plan for SBT again this AM.    Recent Labs     04/24/19  2208   PH 7.340*   PCO2 31.3*   PO2 81   HCO3 16.8*   POCSATURATED 95   BE -9       Vent Mode: A/C  Oxygen Concentration (%):  [] 39  Resp Rate Total:  [13 br/min-81 br/min] 23 br/min  Vt Set:  [350 mL-450 mL] 350 mL  PEEP/CPAP:  [5 cmH20] 5 cmH20  Pressure Support:  [20 cmH20] 20 cmH20  Mean Airway Pressure:  [11 svM05-13 cmH20] 13 cmH20    Cardiac:   - HDS at this time.  Pt has been weaned off pressors.   - TTE ordered yesterday, EF 55%    Renal:    - S/p transection of left ureter 4/12 and subsequent ligation and PCT nephrostomy tube placement with IR 4/21  - Renal function improving. BUN 38 and Cr 1.1 today.    - UOP from Nephrostomy 950 cc/24 hrs;  Fontenot 1200 cc/ 24 hrs  - Lasix 40 mg IV x1 today for worsening CXR appearance  - Monitor I/Os      Intake/Output Summary (Last 24 hours) at 4/25/2019 0758  Last data filed at 4/25/2019 0600  Gross per 24 hour   Intake 2585 ml   Output 2029 ml   Net 556 ml     ID:   - Blood cultures 4/12 +E. Coli; sensitivities pending. Repeat Bld Cx show NGTD.   - UCx from 4/20 growing E. Coli; sensitivities are now back. Repeat Cx pending.   - ID following for assistance with abx therapy, currently  on Ceftriaxone and Rifampin. Will need long term antibiotic therapy and ID follow-up.     Hem/Onc:   - H/H stable at this time; cont to monitor    Endocrine:  - DM Type 2 at baseline    - SSI while on trickle feeds  - Endocrine following for assistance with blood glucose control.     Fluids/Electrolytes/Nutrition/GI:   # Shock Liver   - Resolved    - Labs continue to show improvement   - Elevated LFTs now down trending; ;    - Thrombocytopenia; plts count improving; [id number]    - INR normalized to 0.9    # Lactic Acidosis   - Now resolved    # Diarrhea   - Pt with multiple episodes of loose watery stool resembling urine noted from rectal tube; 800 ml watery stool overnight   - C.Diff panel Negative   - Concerns for possible fistula as fluid resembling urine in color.  Sample from rectal tube to for Cr analysis; awaiting results.    - MADHURI drain also with yellow fluid resembling urine.  Sample sent for Cr analysis, 1.7.     - Trickle feeds started 4/23 at 10 cc/hr  - Start free water flushes 300 cc q6h  - Replace electrolytes PRN      PPx:  - DVT: Plan to restart DVT ppx today       DISPO:  - Continue SICU care  - Plan SBT this AM             Critical care was time spent personally by me on the following activities: development of treatment plan with patient or surrogate and bedside caregivers, discussions with consultants, evaluation of patient's response to treatment, examination of patient, ordering and performing treatments and interventions, ordering and review of laboratory studies, ordering and review of radiographic studies, pulse oximetry, re-evaluation of patient's condition.  This critical care time did not overlap with that of any other provider or involve time for any procedures.     Baylee Ferreira DO  Critical Care - Surgery  [facility]

## 2019-04-25 NOTE — PROGRESS NOTES
[facility]  Urology  Progress Note    Patient Name: Mariann Huff  MRN: [id number]  Admission Date: 4/20/2019  Hospital Length of Stay: 5 days  Code Status: Full Code   Attending Provider: Paul Carlson MD   Primary Care Physician: Jasbir Haney MD    Subjective:     HPI:  Mariann Huff is a 59 y.o. female with history of HTN, type 2 diabetes mellitus, CAD, NSTEMI, and back pain who presents to [facility] with altered mental status and sepsis. She underwent L5-S1 OLIF with NSGY on 4/12 and had intraoperative left ureteral injury with ureteroureteral anastomosis and ureteral stent placement. She did well initially and had correa and MADHURI drain removed on 4/16. She began having chills and altered mental status 2 days ago and this has progressively worsened. No complaints of pain.     She is altered and HPI is limited. In the ED she is febrile to 103 and tachycardic and pressures are low normal. WBC is 5, creatinine is 3.6 baseline 1.0, lactate is 4.6. Cath UA concerning for infection, 3+ LE, >100 WBCs, and many bacteria on microscopy.    CT and MRI abdomen both show air with minimal fluid near the surgical site with left ureter coursing through. There is air throughout the proximal collecting system which is decompressed with JJ ureteral stent in good position.    Taken for ex lap, ligation of left ureter and left neph tube placement on 4/21/19.    Interval History:   HDS off pressors. Remains on the vent failed SBT yesterday. MADHURI consistent with serum creatinine.    Review of Systems  Objective:     Temp:  [98.8 °F (37.1 °C)-99.1 °F (37.3 °C)] 99 °F (37.2 °C)  Pulse:  [70-93] 93  SpO2:  [97 %-100 %] 98 %  BP: ()/(50-71) 129/70     Body mass index is 27.46 kg/m².    Date 04/24/19 0700 - 04/25/19 0659   Shift 8259-3329 1116-4798 3576-9511 24 Hour Total   INTAKE   NG/GT 10   10   Shift Total(mL/kg) 10(0.1)   10(0.1)   OUTPUT   Urine(mL/kg/hr) 150   150   Drains 18   18   Stool 200   200   Shift  Total(mL/kg) 368(5.1)   368(5.1)   Weight (kg) 72.6 72.6 72.6 72.6          Drains     Drain                 Closed/Suction Drain 04/21/19 0300 LLQ 3 days         NG/OG Tube 04/21/19 0728 Center mouth 3 days         Nephrostomy 04/21/19 0629 Left 8 Fr. 3 days         Urethral Catheter 04/20/19 1711 Latex 16 Fr. 3 days         Rectal Tube 04/21/19 2100 rectal tube w/ balloon (indicate number of mLs) 2 days              Physical Exam   Constitutional: She appears well-developed and well-nourished. She is sedated and intubated.   HENT:   Head: Normocephalic and atraumatic.   Neck: No JVD present.   Cardiovascular: Normal rate and regular rhythm.    Pulmonary/Chest: She is intubated.   mechanically ventilated   Abdominal: Soft. She exhibits distension. There is no rebound and no guarding.   Dressing c/d/i  MADHURI drain with minimal SS output   Genitourinary:   Genitourinary Comments: Fontenot draining clear yellow  Left neph tube with clear pink urine   Neurological:   Sedated   Skin: She is not diaphoretic. No pallor.       Significant Labs:    BMP:  Recent Labs   Lab 04/22/19  1701 04/23/19  0400 04/24/19  0420    146* 148*   K 3.4* 3.9 4.0   * 115* 117*   CO2 21* 21* 18*   BUN 43* 43* 41*   CREATININE 1.9* 1.9* 1.5*   CALCIUM 8.0* 8.2* 8.5*       CBC:   Recent Labs   Lab 04/22/19  1207 04/23/19  0400 04/24/19  0420   WBC 9.73 10.59 12.49   HGB 8.2* 8.0* 8.3*   HCT 24.1* 24.5* 26.4*   PLT 86* 101* 104*       All pertinent labs results from the past 24 hours have been reviewed.    Significant Imaging:  All pertinent imaging results/findings from the past 24 hours have been reviewed.      Assessment/Plan:     * Ureteral transection of left native kidney  Mariann Huff is a 59 y.o. female s/p left ureteral injury on 4/12, presented with fever, AMS, and intraabdominal abscess, taken for washout and ligation of left ureter with neph tube placement on 4/21    - Management per SICU, appreciate assistance   - Vent per  SICU, SBT today and wean to extubate  - Urine cultures with E. Coli, likely transition to Rocephin today, blood cultures GNRs likely same species  - Maintain correa and neph tube at this time  - maintain MADHURI drain      Sepsis  As above        VTE Risk Mitigation (From admission, onward)        Ordered     heparin (porcine) injection 5,000 Units  Every 8 hours      04/25/19 0730     Place sequential compression device  Until discontinued      04/20/19 2108     IP VTE HIGH RISK PATIENT  Once      04/20/19 2108          Hugh Higuera MD  Urology  [facility]

## 2019-04-25 NOTE — PLAN OF CARE
Problem: Adult Inpatient Plan of Care  Goal: Plan of Care Review  Outcome: Ongoing (interventions implemented as appropriate)  POC reviewed with Pt and spouse. Pt is awake and alert. Pt follows commands. Pts on AC VC+ 40/5. Pts HR has been 60s. SBP has been 150s-160s. MAPs>65. CVP 14-15. CO2 30-35. Precedex titrated for comfort and RASS. Pain being controlled with PRN meds. MADHURI intact with small amount of serous fluid. Nephrostomy tube intact with moderate amount of yellow drainage. Fontenot intact with adequate U/O of 40-60cc/hr. Bath given. SCDs and heel boots on. Spouse at bedside. VSS. Will continue to monitor.

## 2019-04-25 NOTE — PROGRESS NOTES
"[facility]  Endocrinology  Progress Note    Admit Date: 4/20/2019     Reason for Consult: Management of T2DM, Hyperglycemia     Surgical Procedure and Date:       04/21/2019:         1. Exploratory laparotomy        2. Ligation of left ureter        3. Removal of left JJ ureteral stent      Diabetes diagnosis year: ANDRE    Home Diabetes Medications:  ANDRE  Toujeo 50 BID  Invokana 300mg daily   Novolog 35 units AM, 45 units PM, 35 units PM    How often checking glucose at home? ANDRE   BG readings on regimen: ANDRE  Hypoglycemia on the regimen?  ANDRE  Missed doses on regimen?  ANDRE    Diabetes Complications include:     Hyperglycemia and Diabetic retinopathy     Complicating diabetes co morbidities:   History of MI and MURTAZA, CAD, HLD    HPI:   Patient is a 59 y.o. female with a diagnosis of HTN, HLN, DM type 2, nonproliferative diabetic renopathy, CAD, NSTEMI, and back pain who presents to the ED with a complaint of altered mental status on 04/20/2019. Is now s/p Exploratory laparotomy, Ligation of left ureter, and Removal of left JJ ureteral stent. Endocrinology consulted to manage DM2/Hyperglycemia.    Lab Results   Component Value Date    HGBA1C 10.8 (H) 02/19/2019       Interval HPI:   Overnight events:   NAEON. BG remains within goal without need for SQ insulin regimen. However, patient has been started on TF.     Eating:   NPO  Nausea: No  Hypoglycemia and intervention: No  Fever: No  TPN and/or TF: TF  If yes, type of TF/TPN and rate: 10ml/hr.     BP (!) 106/58   Pulse 82   Temp 98.8 °F (37.1 °C) (Oral)   Resp (!) 4   Ht 5' 4" (1.626 m)   Wt 72.6 kg (160 lb)   SpO2 100%   Breastfeeding? No   BMI 27.46 kg/m²      Labs Reviewed and Include    Recent Labs   Lab 04/23/19  0400   GLU 99   CALCIUM 8.2*   ALBUMIN 1.8*   PROT 5.2*   *   K 3.9   CO2 21*   *   BUN 43*   CREATININE 1.9*   ALKPHOS 133   *   *   BILITOT 5.8*     Lab Results   Component Value Date    WBC 10.59 " 04/23/2019    HGB 8.0 (L) 04/23/2019    HCT 24.5 (L) 04/23/2019    MCV 86 04/23/2019     (L) 04/23/2019     No results for input(s): TSH, FREET4 in the last 168 hours.  Lab Results   Component Value Date    HGBA1C 10.8 (H) 02/19/2019       Nutritional status:   Body mass index is 27.46 kg/m².  Lab Results   Component Value Date    ALBUMIN 1.8 (L) 04/23/2019    ALBUMIN 2.1 (L) 04/22/2019    ALBUMIN 1.9 (L) 04/22/2019     No results found for: PREALBUMIN    Estimated Creatinine Clearance: 31.5 mL/min (A) (based on SCr of 1.9 mg/dL (H)).    Accu-Checks  Recent Labs     04/22/19  1614 04/22/19  1704 04/22/19  1805 04/22/19  1834 04/22/19  2010 04/22/19  2203 04/22/19  2353 04/23/19  0354 04/23/19  0802 04/23/19  1205   POCTGLUCOSE 99 108 96 92 101 95 110 109 101 111*       Current Medications and/or Treatments Impacting Glycemic Control  Immunotherapy:    Immunosuppressants     None        Steroids:   Hormones (From admission, onward)    Start     Stop Route Frequency Ordered    04/21/19 0845  vasopressin (PITRESSIN) 0.2 Units/mL in dextrose 5 % 100 mL infusion      -- IV Continuous 04/21/19 0736        Pressors:    Autonomic Drugs (From admission, onward)    Start     Stop Route Frequency Ordered    04/21/19 0745  norepinephrine 4 mg in dextrose 5% 250 mL infusion (premix) (titrating)     Question Answer Comment   Titrate by: (in mcg/kg/min) 0.02    Titrate interval: (in minutes) 5    Titrate to maintain: (MAP or SBP) MAP    Greater than: (in mmHg) 65    Maximum dose: (in mcg/kg/min) 3        -- IV Continuous 04/21/19 0736        Hyperglycemia/Diabetes Medications:   Antihyperglycemics (From admission, onward)    Start     Stop Route Frequency Ordered    04/23/19 0747  insulin aspart U-100 pen 0-5 Units      -- SubQ Every 6 hours PRN 04/23/19 0647          ASSESSMENT and PLAN    * Ureteral transection of left native kidney  Managed per primary team  Avoid hypoglycemia        Type 2 diabetes mellitus with  diabetic peripheral angiopathy without gangrene  BG goal 140 - 180    Continue Low Dose SQ Insulin Correction Scale.  BG monitoring every 6 hours if patient continues to not need SQ insulin correction.     ** Please notify Endocrine for any change and/or advance in diet/TF**  ** Please call Endocrine for any BG related issues **    Discharge Recommendations:     TBD.             CAD (coronary artery disease)    Managed per primary team  Condition may cause insulin resistance       PAPA (acute kidney injury)  Caution with insulin stacking        HLD (hyperlipidemia)  Managed per primary team  Condition may cause insulin resistance         Sleep apnea  May affect BG readings if uncontrolled        On enteral nutrition  May cause BG excursions.           Uirah Holder, NP  Endocrinology  [facility]

## 2019-04-25 NOTE — PROGRESS NOTES
[facility]  Urology  Progress Note    Patient Name: Mariann Huff  MRN: [id number]  Admission Date: 4/20/2019  Hospital Length of Stay: 5 days  Code Status: Full Code   Attending Provider: Paul Carlson MD   Primary Care Physician: Jasbir Haney MD    Subjective:     HPI:  Mariann Huff is a 59 y.o. female with history of HTN, type 2 diabetes mellitus, CAD, NSTEMI, and back pain who presents to [facility] [place] with altered mental status and sepsis. She underwent L5-S1 OLIF with NSGY on 4/12 and had intraoperative left ureteral injury with ureteroureteral anastomosis and ureteral stent placement. She did well initially and had correa and MADHURI drain removed on 4/16. She began having chills and altered mental status 2 days ago and this has progressively worsened. No complaints of pain.     She is altered and HPI is limited. In the ED she is febrile to 103 and tachycardic and pressures are low normal. WBC is 5, creatinine is 3.6 baseline 1.0, lactate is 4.6. Cath UA concerning for infection, 3+ LE, >100 WBCs, and many bacteria on microscopy.    CT and MRI abdomen both show air with minimal fluid near the surgical site with left ureter coursing through. There is air throughout the proximal collecting system which is decompressed with JJ ureteral stent in good position.    Taken for ex lap, ligation of left ureter and left neph tube placement on 4/21/19.    Interval History:   No acute events. Remains HDS off pressors. On the vent failed SBT yesterday. Changed to precedex for sedation.    Review of Systems    Objective:     Temp:  [98 °F (36.7 °C)-99.4 °F (37.4 °C)] 98 °F (36.7 °C)  Pulse:  [63-75] 67  Resp:  [20-23] 23  SpO2:  [95 %-100 %] 100 %  BP: (114-175)/(55-82) 162/74     Body mass index is 27.46 kg/m².    Date 04/25/19 0700 - 04/26/19 0659   Shift 6085-3121 2341-1275 5506-7317 24 Hour Total   INTAKE   I.V.(mL/kg) 129.8(1.8)   129.8(1.8)   NG/GT 20   20   Shift Total(mL/kg) 149.8(2.1)   149.8(2.1)    OUTPUT   Urine(mL/kg/hr) 425   425   Drains 60   60   Shift Total(mL/kg) 485(6.7)   485(6.7)   Weight (kg) 72.6 72.6 72.6 72.6          Drains     Drain                 Closed/Suction Drain 04/21/19 0300 LLQ 3 days         NG/OG Tube 04/21/19 0728 Center mouth 3 days         Nephrostomy 04/21/19 0629 Left 8 Fr. 3 days         Urethral Catheter 04/20/19 1711 Latex 16 Fr. 3 days         Rectal Tube 04/21/19 2100 rectal tube w/ balloon (indicate number of mLs) 2 days              Physical Exam   Constitutional: She appears well-developed and well-nourished. She is sedated and intubated.   HENT:   Head: Normocephalic and atraumatic.   Neck: No JVD present.   Cardiovascular: Normal rate and regular rhythm.    Pulmonary/Chest: She is intubated.   mechanically ventilated   Abdominal: Soft. She exhibits distension (improving). There is no rebound and no guarding.   Dressing c/d/i  MADHURI drain with minimal SS output   Genitourinary:   Genitourinary Comments: Fontenot draining clear yellow  Left neph tube with clear pink urine   Neurological:   Sedated   Skin: She is not diaphoretic. No pallor.       Significant Labs:    BMP:  Recent Labs   Lab 04/23/19  0400 04/24/19  0420 04/25/19  0300   * 148* 146*   K 3.9 4.0 4.2   * 117* 115*   CO2 21* 18* 18*   BUN 43* 41* 38*   CREATININE 1.9* 1.5* 1.1   CALCIUM 8.2* 8.5* 8.8       CBC:   Recent Labs   Lab 04/23/19  0400 04/24/19  0420 04/24/19  2208 04/25/19  0300   WBC 10.59 12.49  --  11.87   HGB 8.0* 8.3*  --  8.6*   HCT 24.5* 26.4* 26* 27.8*   * 104*  --  104*       All pertinent labs results from the past 24 hours have been reviewed.    Significant Imaging:  All pertinent imaging results/findings from the past 24 hours have been reviewed.      Assessment/Plan:     * Ureteral transection of left native kidney  Mariann Huff is a 59 y.o. female s/p left ureteral injury on 4/12, presented with fever, AMS, and intraabdominal abscess, taken for washout and ligation  of left ureter with neph tube placement on 4/21    - Management per SICU, appreciate assistance, changed to precedex for sedation  - Vent per SICU, SBT today and wean to extubate  - Urine cultures with E. Coli, on Rocephin  - Maintain correa and neph tube at this time  - maintain MADHURI drain      Sepsis  As above        VTE Risk Mitigation (From admission, onward)        Ordered     heparin (porcine) injection 5,000 Units  Every 8 hours      04/25/19 0730     Place sequential compression device  Until discontinued      04/20/19 2108     IP VTE HIGH RISK PATIENT  Once      04/20/19 2108          Hugh Higuera MD  Urology  [facility]

## 2019-04-25 NOTE — ADDENDUM NOTE
Addendum  created 04/25/19 1405 by Gabino Herrera Jr., MD    Intraprocedure Blocks edited, Sign clinical note

## 2019-04-25 NOTE — ASSESSMENT & PLAN NOTE
58 y/o female with HTN, DMII, CAD, NSTEMI, s/p L5-S1 OLIF with NSGY 4/12 c/b intraoperative left ureteral injury and underwent ureteroureteral anastomoses and ureteral stent placement. She was discharged with a correa and MADHURI drain that was removed on 4/16. She was seen by urology and anticipated stent removal in 2-3 months (6/2019).  She presents to ED with AMS and E.coli septicemia found to have air and fluid collection of left anterior prevertebral soft tissue with left ureter involvement. She underwent ex-lap and washout on 4/21. We are consulted for abx recommendations.      Per op note,  There were pocket of purulence noted and evacuated, sent for culture of left retroperitoneum to pole of left kidney. The stent was visible. Posterior to the stent there was a pocket of purulent fluid and exposed hardware along the spinal vertebrae. The tissue along the distal and proximal end of ureter were friable and unhealthy. She underwent left nephrostomy tube placement. The stent was removed and several metal clips were placed on proximal end of the ureteral.     Recommendations:  1. Continue ceftriaxone [id number]. Added rifampin 300 mg q12hr IV (transition to oral once able) given hardware in place.   2. Repeat blood cultures NGTD.  3. Surgical cultures Staph lugdenesis pansensitive.  4. Contact isolation status.   5. Anticipate 6 weeks of IV abx from first negative blood cultures (4/23-6/4/19)  followed by oral abx suppression given retained hardware.   6. Will need weekly cbc cmp esr crp sent to ID clinic at .  Discussed with ID staff. ID will sign off.

## 2019-04-25 NOTE — SUBJECTIVE & OBJECTIVE
Interval History/Significant Events: NAEON. HDS, not on any pressor support. Remains intubated on precedex gtt.  Plan for SBT this AM.     Follow-up For: Procedure(s) (LRB):  Creation, Nephrostomy, Percutaneous (Left)    Post-Operative Day: 4 Days Post-Op    Objective:     Vital Signs (Most Recent):  Temp: 99.4 °F (37.4 °C) (04/25/19 0301)  Pulse: 67 (04/25/19 0723)  Resp: (!) 23 (04/25/19 0301)  BP: (!) 170/74 (04/25/19 0630)  SpO2: 97 % (04/25/19 0723) Vital Signs (24h Range):  Temp:  [99 °F (37.2 °C)-99.4 °F (37.4 °C)] 99.4 °F (37.4 °C)  Pulse:  [63-79] 67  Resp:  [20-23] 23  SpO2:  [95 %-100 %] 97 %  BP: (114-175)/(55-82) 170/74     Weight: 72.6 kg (160 lb)  Body mass index is 27.46 kg/m².      Intake/Output Summary (Last 24 hours) at 4/25/2019 0754  Last data filed at 4/25/2019 0600  Gross per 24 hour   Intake 2585 ml   Output 2029 ml   Net 556 ml       Physical Exam   Constitutional: She appears well-developed and well-nourished.   HENT:   Head: Normocephalic and atraumatic.   Intubated   Eyes: Right eye exhibits no discharge. Left eye exhibits no discharge.   Neck: Normal range of motion. Neck supple.   Cardiovascular: Normal rate and regular rhythm.   Pulmonary/Chest: Effort normal. No respiratory distress.   Intubated   Abdominal:   Midline incision c/d/i.  MADHURI drainage yellow.  Abdomen soft, non-distended. Rectal tube with watery output. OG tube in place.    Genitourinary:   Genitourinary Comments: Nephrostomy tube in place with watery yellow output.  Fontenot catheter+   Musculoskeletal: Normal range of motion.   Neurological:   Sedated on precedex gtt   Skin: Skin is warm and dry.   Vitals reviewed.      Vents:  Vent Mode: A/C (04/25/19 0723)  Set Rate: 10 bmp (04/25/19 0723)  Vt Set: 350 mL (04/25/19 0723)  Pressure Support: 20 cmH20 (04/24/19 1449)  PEEP/CPAP: 5 cmH20 (04/25/19 0723)  Oxygen Concentration (%): 39 (04/25/19 0723)  Peak Airway Pressure: 29 cmH2O (04/25/19 0723)  Plateau Pressure: 35 cmH20  (04/25/19 0723)  Total Ve: 8.03 mL (04/25/19 0723)  F/VT Ratio<105 (RSBI): (!) 8.91 (04/23/19 0731)    Lines/Drains/Airways     Central Venous Catheter Line                 Percutaneous Central Line Insertion/Assessment - triple lumen  04/21/19 0100 right internal jugular 4 days          Drain                 Closed/Suction Drain 04/21/19 0300 LLQ 4 days         NG/OG Tube 04/21/19 0728 Center mouth 4 days         Nephrostomy 04/21/19 0629 Left 8 Fr. 4 days         Urethral Catheter 04/20/19 1711 Latex 16 Fr. 4 days         Rectal Tube 04/21/19 2100 rectal tube w/ balloon (indicate number of mLs) 3 days          Airway                 Airway - Non-Surgical 04/21/19 0029 Endotracheal Tube 4 days          Peripheral Intravenous Line                 Peripheral IV - Single Lumen 04/20/19 1650 18 G Left Antecubital 4 days         Peripheral IV - Single Lumen 04/20/19 1710 18 G Right Antecubital 4 days                Significant Labs:    CBC/Anemia Profile:  Recent Labs   Lab 04/24/19  0420 04/24/19  2208 04/25/19  0300   WBC 12.49  --  11.87   HGB 8.3*  --  8.6*   HCT 26.4* 26* 27.8*   *  --  104*   MCV 87  --  88   RDW 14.6*  --  14.8*        Chemistries:  Recent Labs   Lab 04/24/19  0420 04/25/19  0300   * 146*   K 4.0 4.2   * 115*   CO2 18* 18*   BUN 41* 38*   CREATININE 1.5* 1.1   CALCIUM 8.5* 8.8   ALBUMIN 1.6* 1.6*   PROT 5.3* 5.6*   BILITOT 6.7* 7.9*   ALKPHOS 161* 210*   * 258*   * 115*   MG 2.4 2.5   PHOS 2.1* 3.0       All pertinent labs within the past 24 hours have been reviewed.    Significant Imaging:  I have reviewed and interpreted all pertinent imaging results/findings within the past 24 hours.

## 2019-04-25 NOTE — SUBJECTIVE & OBJECTIVE
Interval History:   NAEON  CXR with worsening bilateral airspace consolidation - on lasix   No fever chills or sweats  Surgical cultures +Staph lugdenesis pansensitive       Review of Systems   Unable to perform ROS: Intubated     Objective:     Vital Signs (Most Recent):  Temp: 98 °F (36.7 °C) (04/25/19 0700)  Pulse: 66 (04/25/19 0905)  Resp: (!) 23 (04/25/19 0301)  BP: (!) 151/72 (04/25/19 0905)  SpO2: 100 % (04/25/19 0905) Vital Signs (24h Range):  Temp:  [98 °F (36.7 °C)-99.4 °F (37.4 °C)] 98 °F (36.7 °C)  Pulse:  [63-71] 66  Resp:  [20-23] 23  SpO2:  [95 %-100 %] 100 %  BP: (114-175)/(55-82) 151/72     Weight: 72.6 kg (160 lb)  Body mass index is 27.46 kg/m².    Estimated Creatinine Clearance: 54.5 mL/min (based on SCr of 1.1 mg/dL).    Physical Exam   Constitutional: She appears well-developed and well-nourished. No distress.       intubated   HENT:   Head: Normocephalic.   Cardiovascular: Normal rate, regular rhythm and normal heart sounds.   No murmur heard.  Pulmonary/Chest: Effort normal. No stridor. No respiratory distress.   Abdominal: Soft. She exhibits no distension.   Rectal tube with watery stool output  Nephrostomy drain in place   Musculoskeletal: She exhibits no edema or deformity.   Neurological:   Aroused with verbal stimuli   Skin: Skin is warm and dry. She is not diaphoretic. No erythema. No pallor.   Psychiatric: She has a normal mood and affect.   Vitals reviewed.      Significant Labs: All pertinent labs within the past 24 hours have been reviewed.    Significant Imaging: I have reviewed all pertinent imaging results/findings within the past 24 hours.

## 2019-04-25 NOTE — ASSESSMENT & PLAN NOTE
ASSESSMENT/PLAN:   Mariann Huff is a 59 y.o. female s/p left ureteral injury on 4/12, who presented with fever, AMS, and intraabdominal abscess, taken for washout and ligation of left ureter with nephrostomy tube placement on 4/21.     Neuro:   - Sedated with precedex gtt.   - Pain control with fentanyl prn     Pulmonary:   - Intubated and sedated on precedex gtt.    - Daily CXR.  CXR 4/25 shows worsening airspace consolidation b/l.     - Lasix 40 IV given. Will continue to monitor.  - ABGs prn   - Failed SBT yesterday.  Plan for SBT again this AM.    Recent Labs     04/24/19  2208   PH 7.340*   PCO2 31.3*   PO2 81   HCO3 16.8*   POCSATURATED 95   BE -9       Vent Mode: A/C  Oxygen Concentration (%):  [] 39  Resp Rate Total:  [13 br/min-81 br/min] 23 br/min  Vt Set:  [350 mL-450 mL] 350 mL  PEEP/CPAP:  [5 cmH20] 5 cmH20  Pressure Support:  [20 cmH20] 20 cmH20  Mean Airway Pressure:  [11 qqH48-48 cmH20] 13 cmH20    Cardiac:   - HDS at this time.  Pt has been weaned off pressors.   - TTE ordered yesterday, EF 55%    Renal:    - S/p transection of left ureter 4/12 and subsequent ligation and PCT nephrostomy tube placement with IR 4/21  - Renal function improving. BUN 38 and Cr 1.1 today.    - UOP from Nephrostomy 950 cc/24 hrs;  Fontenot 1200 cc/ 24 hrs  - Lasix 40 mg IV x1 today for worsening CXR appearance  - Monitor I/Os      Intake/Output Summary (Last 24 hours) at 4/25/2019 0758  Last data filed at 4/25/2019 0600  Gross per 24 hour   Intake 2585 ml   Output 2029 ml   Net 556 ml     ID:   - Blood cultures 4/12 +E. Coli; sensitivities pending. Repeat Bld Cx show NGTD.   - UCx from 4/20 growing E. Coli; sensitivities are now back. Repeat Cx pending.   - ID following for assistance with abx therapy, currently on Ceftriaxone and Rifampin. Will need long term antibiotic therapy and ID follow-up.     Hem/Onc:   - H/H stable at this time; cont to monitor    Endocrine:  - DM Type 2 at baseline    - SSI while on  trickle feeds  - Endocrine following for assistance with blood glucose control.     Fluids/Electrolytes/Nutrition/GI:   # Shock Liver   - Resolved    - Labs continue to show improvement   - Elevated LFTs now down trending; ;    - Thrombocytopenia; plts count improving; [id number]    - INR normalized to 0.9    # Lactic Acidosis   - Now resolved    # Diarrhea   - Pt with multiple episodes of loose watery stool resembling urine noted from rectal tube; 800 ml watery stool overnight   - C.Diff panel Negative   - Concerns for possible fistula as fluid resembling urine in color.  Sample from rectal tube to for Cr analysis; awaiting results.    - MADHURI drain also with yellow fluid resembling urine.  Sample sent for Cr analysis, 1.7.     - Trickle feeds started 4/23 at 10 cc/hr  - Start free water flushes 300 cc q6h  - Replace electrolytes PRN      PPx:  - DVT: Plan to restart DVT ppx today       DISPO:  - Continue SICU care  - Plan SBT this AM

## 2019-04-25 NOTE — ASSESSMENT & PLAN NOTE
Mariann TRISTIN Huff is a 59 y.o. female s/p left ureteral injury on 4/12, presented with fever, AMS, and intraabdominal abscess, taken for washout and ligation of left ureter with neph tube placement on 4/21    - Management per SICU, appreciate assistance, changed to precedex for sedation  - Vent per SICU, SBT today and wean to extubate  - Urine cultures with E. Coli, on Rocephin  - Maintain correa and neph tube at this time  - maintain MADHURI drain

## 2019-04-26 LAB
ALBUMIN SERPL BCP-MCNC: 1.5 G/DL (ref 3.5–5.2)
ALLENS TEST: ABNORMAL
ALP SERPL-CCNC: 226 U/L (ref 55–135)
ALT SERPL W/O P-5'-P-CCNC: 164 U/L (ref 10–44)
ANION GAP SERPL CALC-SCNC: 9 MMOL/L (ref 8–16)
ANISOCYTOSIS BLD QL SMEAR: SLIGHT
AST SERPL-CCNC: 60 U/L (ref 10–40)
BACTERIA UR CULT: NO GROWTH
BASOPHILS # BLD AUTO: ABNORMAL K/UL (ref 0–0.2)
BASOPHILS NFR BLD: 0 % (ref 0–1.9)
BILIRUB SERPL-MCNC: 7 MG/DL (ref 0.1–1)
BUN SERPL-MCNC: 33 MG/DL (ref 6–20)
CALCIUM SERPL-MCNC: 9.3 MG/DL (ref 8.7–10.5)
CHLORIDE SERPL-SCNC: 112 MMOL/L (ref 95–110)
CO2 SERPL-SCNC: 25 MMOL/L (ref 23–29)
CREAT SERPL-MCNC: 1.1 MG/DL (ref 0.5–1.4)
DELSYS: ABNORMAL
DIFFERENTIAL METHOD: ABNORMAL
EOSINOPHIL # BLD AUTO: ABNORMAL K/UL (ref 0–0.5)
EOSINOPHIL NFR BLD: 1 % (ref 0–8)
ERYTHROCYTE [DISTWIDTH] IN BLOOD BY AUTOMATED COUNT: 14.8 % (ref 11.5–14.5)
ERYTHROCYTE [SEDIMENTATION RATE] IN BLOOD BY WESTERGREN METHOD: 10 MM/H
EST. GFR  (AFRICAN AMERICAN): >60 ML/MIN/1.73 M^2
EST. GFR  (NON AFRICAN AMERICAN): 55.5 ML/MIN/1.73 M^2
FIO2: 40
GLUCOSE SERPL-MCNC: 182 MG/DL (ref 70–110)
HCO3 UR-SCNC: 25.7 MMOL/L (ref 24–28)
HCT VFR BLD AUTO: 27.5 % (ref 37–48.5)
HGB BLD-MCNC: 8.7 G/DL (ref 12–16)
HYPOCHROMIA BLD QL SMEAR: ABNORMAL
IMM GRANULOCYTES # BLD AUTO: ABNORMAL K/UL (ref 0–0.04)
IMM GRANULOCYTES NFR BLD AUTO: ABNORMAL % (ref 0–0.5)
INR PPP: 0.9 (ref 0.8–1.2)
LYMPHOCYTES # BLD AUTO: ABNORMAL K/UL (ref 1–4.8)
LYMPHOCYTES NFR BLD: 3 % (ref 18–48)
MAGNESIUM SERPL-MCNC: 1.7 MG/DL (ref 1.6–2.6)
MCH RBC QN AUTO: 27 PG (ref 27–31)
MCHC RBC AUTO-ENTMCNC: 31.6 G/DL (ref 32–36)
MCV RBC AUTO: 85 FL (ref 82–98)
METAMYELOCYTES NFR BLD MANUAL: 1 %
MODE: ABNORMAL
MONOCYTES # BLD AUTO: ABNORMAL K/UL (ref 0.3–1)
MONOCYTES NFR BLD: 8 % (ref 4–15)
MYELOCYTES NFR BLD MANUAL: 1 %
NEUTROPHILS NFR BLD: 86 % (ref 38–73)
NRBC BLD-RTO: 1 /100 WBC
OVALOCYTES BLD QL SMEAR: ABNORMAL
PCO2 BLDA: 40.8 MMHG (ref 35–45)
PEEP: 5
PH SMN: 7.41 [PH] (ref 7.35–7.45)
PHOSPHATE SERPL-MCNC: 3.1 MG/DL (ref 2.7–4.5)
PIP: 30
PLATELET # BLD AUTO: 140 K/UL (ref 150–350)
PLATELET BLD QL SMEAR: ABNORMAL
PMV BLD AUTO: 11.9 FL (ref 9.2–12.9)
PO2 BLDA: 103 MMHG (ref 80–100)
POC BE: 1 MMOL/L
POC SATURATED O2: 98 % (ref 95–100)
POC TCO2: 27 MMOL/L (ref 23–27)
POCT GLUCOSE: 179 MG/DL (ref 70–110)
POCT GLUCOSE: 189 MG/DL (ref 70–110)
POCT GLUCOSE: 195 MG/DL (ref 70–110)
POCT GLUCOSE: 210 MG/DL (ref 70–110)
POCT GLUCOSE: 214 MG/DL (ref 70–110)
POIKILOCYTOSIS BLD QL SMEAR: SLIGHT
POLYCHROMASIA BLD QL SMEAR: ABNORMAL
POTASSIUM SERPL-SCNC: 3.5 MMOL/L (ref 3.5–5.1)
PROT SERPL-MCNC: 5.7 G/DL (ref 6–8.4)
PROTHROMBIN TIME: 9.9 SEC (ref 9–12.5)
RBC # BLD AUTO: 3.22 M/UL (ref 4–5.4)
SAMPLE: ABNORMAL
SITE: ABNORMAL
SODIUM SERPL-SCNC: 146 MMOL/L (ref 136–145)
SP02: 100
VT: 420
WBC # BLD AUTO: 14.85 K/UL (ref 3.9–12.7)

## 2019-04-26 PROCEDURE — 63600175 PHARM REV CODE 636 W HCPCS: Performed by: ANESTHESIOLOGY

## 2019-04-26 PROCEDURE — C9113 INJ PANTOPRAZOLE SODIUM, VIA: HCPCS | Performed by: ANESTHESIOLOGY

## 2019-04-26 PROCEDURE — 83735 ASSAY OF MAGNESIUM: CPT

## 2019-04-26 PROCEDURE — 63600175 PHARM REV CODE 636 W HCPCS: Performed by: PHYSICIAN ASSISTANT

## 2019-04-26 PROCEDURE — 82803 BLOOD GASES ANY COMBINATION: CPT

## 2019-04-26 PROCEDURE — 85007 BL SMEAR W/DIFF WBC COUNT: CPT

## 2019-04-26 PROCEDURE — 99900035 HC TECH TIME PER 15 MIN (STAT)

## 2019-04-26 PROCEDURE — 36600 WITHDRAWAL OF ARTERIAL BLOOD: CPT

## 2019-04-26 PROCEDURE — 27200966 HC CLOSED SUCTION SYSTEM

## 2019-04-26 PROCEDURE — 94003 VENT MGMT INPAT SUBQ DAY: CPT

## 2019-04-26 PROCEDURE — 25000003 PHARM REV CODE 250: Performed by: PHYSICIAN ASSISTANT

## 2019-04-26 PROCEDURE — 85610 PROTHROMBIN TIME: CPT

## 2019-04-26 PROCEDURE — 27000221 HC OXYGEN, UP TO 24 HOURS

## 2019-04-26 PROCEDURE — 63600175 PHARM REV CODE 636 W HCPCS: Performed by: STUDENT IN AN ORGANIZED HEALTH CARE EDUCATION/TRAINING PROGRAM

## 2019-04-26 PROCEDURE — 99900026 HC AIRWAY MAINTENANCE (STAT)

## 2019-04-26 PROCEDURE — 84100 ASSAY OF PHOSPHORUS: CPT

## 2019-04-26 PROCEDURE — 20000000 HC ICU ROOM

## 2019-04-26 PROCEDURE — 82800 BLOOD PH: CPT

## 2019-04-26 PROCEDURE — 85027 COMPLETE CBC AUTOMATED: CPT

## 2019-04-26 PROCEDURE — 99291 PR CRITICAL CARE, E/M 30-74 MINUTES: ICD-10-PCS | Mod: ,,, | Performed by: SURGERY

## 2019-04-26 PROCEDURE — 63600175 PHARM REV CODE 636 W HCPCS: Performed by: UROLOGY

## 2019-04-26 PROCEDURE — 63600175 PHARM REV CODE 636 W HCPCS: Performed by: HOSPITALIST

## 2019-04-26 PROCEDURE — 99291 CRITICAL CARE FIRST HOUR: CPT | Mod: ,,, | Performed by: SURGERY

## 2019-04-26 PROCEDURE — 94761 N-INVAS EAR/PLS OXIMETRY MLT: CPT

## 2019-04-26 PROCEDURE — 80053 COMPREHEN METABOLIC PANEL: CPT

## 2019-04-26 PROCEDURE — 63600175 PHARM REV CODE 636 W HCPCS: Mod: JG | Performed by: STUDENT IN AN ORGANIZED HEALTH CARE EDUCATION/TRAINING PROGRAM

## 2019-04-26 PROCEDURE — 94770 HC EXHALED C02 TEST: CPT

## 2019-04-26 RX ORDER — INSULIN ASPART 100 [IU]/ML
3 INJECTION, SOLUTION INTRAVENOUS; SUBCUTANEOUS
Status: DISCONTINUED | OUTPATIENT
Start: 2019-04-26 | End: 2019-04-27

## 2019-04-26 RX ORDER — ONDANSETRON 2 MG/ML
4 INJECTION INTRAMUSCULAR; INTRAVENOUS EVERY 6 HOURS PRN
Status: DISCONTINUED | OUTPATIENT
Start: 2019-04-26 | End: 2019-05-08

## 2019-04-26 RX ORDER — INSULIN ASPART 100 [IU]/ML
3 INJECTION, SOLUTION INTRAVENOUS; SUBCUTANEOUS
Status: DISCONTINUED | OUTPATIENT
Start: 2019-04-27 | End: 2019-04-27

## 2019-04-26 RX ORDER — INSULIN ASPART 100 [IU]/ML
0-5 INJECTION, SOLUTION INTRAVENOUS; SUBCUTANEOUS EVERY 4 HOURS PRN
Status: DISCONTINUED | OUTPATIENT
Start: 2019-04-26 | End: 2019-04-30

## 2019-04-26 RX ORDER — FUROSEMIDE 10 MG/ML
40 INJECTION INTRAMUSCULAR; INTRAVENOUS ONCE
Status: COMPLETED | OUTPATIENT
Start: 2019-04-26 | End: 2019-04-26

## 2019-04-26 RX ORDER — GLUCAGON 1 MG
1 KIT INJECTION
Status: DISCONTINUED | OUTPATIENT
Start: 2019-04-26 | End: 2019-04-30

## 2019-04-26 RX ADMIN — FENTANYL CITRATE 50 MCG: 50 INJECTION INTRAMUSCULAR; INTRAVENOUS at 05:04

## 2019-04-26 RX ADMIN — PANTOPRAZOLE SODIUM 40 MG: 40 INJECTION, POWDER, LYOPHILIZED, FOR SOLUTION INTRAVENOUS at 09:04

## 2019-04-26 RX ADMIN — CHLORHEXIDINE GLUCONATE 0.12% ORAL RINSE 15 ML: 1.2 LIQUID ORAL at 09:04

## 2019-04-26 RX ADMIN — CEFTRIAXONE 2 G: 2 INJECTION, SOLUTION INTRAVENOUS at 04:04

## 2019-04-26 RX ADMIN — FENTANYL CITRATE 50 MCG: 50 INJECTION INTRAMUSCULAR; INTRAVENOUS at 09:04

## 2019-04-26 RX ADMIN — INSULIN ASPART 3 UNITS: 100 INJECTION, SOLUTION INTRAVENOUS; SUBCUTANEOUS at 04:04

## 2019-04-26 RX ADMIN — ALTEPLASE 2 MG: 2.2 INJECTION, POWDER, LYOPHILIZED, FOR SOLUTION INTRAVENOUS at 04:04

## 2019-04-26 RX ADMIN — HEPARIN SODIUM 5000 UNITS: 5000 INJECTION, SOLUTION INTRAVENOUS; SUBCUTANEOUS at 05:04

## 2019-04-26 RX ADMIN — INSULIN ASPART 2 UNITS: 100 INJECTION, SOLUTION INTRAVENOUS; SUBCUTANEOUS at 07:04

## 2019-04-26 RX ADMIN — HEPARIN SODIUM 5000 UNITS: 5000 INJECTION, SOLUTION INTRAVENOUS; SUBCUTANEOUS at 01:04

## 2019-04-26 RX ADMIN — INSULIN ASPART 3 UNITS: 100 INJECTION, SOLUTION INTRAVENOUS; SUBCUTANEOUS at 09:04

## 2019-04-26 RX ADMIN — INSULIN ASPART 3 UNITS: 100 INJECTION, SOLUTION INTRAVENOUS; SUBCUTANEOUS at 11:04

## 2019-04-26 RX ADMIN — POTASSIUM CHLORIDE 40 MEQ: 400 INJECTION, SOLUTION INTRAVENOUS at 05:04

## 2019-04-26 RX ADMIN — INSULIN ASPART 3 UNITS: 100 INJECTION, SOLUTION INTRAVENOUS; SUBCUTANEOUS at 12:04

## 2019-04-26 RX ADMIN — HEPARIN SODIUM 5000 UNITS: 5000 INJECTION, SOLUTION INTRAVENOUS; SUBCUTANEOUS at 09:04

## 2019-04-26 RX ADMIN — RIFAMPIN 300 MG: 600 INJECTION, POWDER, LYOPHILIZED, FOR SOLUTION INTRAVENOUS at 01:04

## 2019-04-26 RX ADMIN — ONDANSETRON 4 MG: 2 INJECTION INTRAMUSCULAR; INTRAVENOUS at 08:04

## 2019-04-26 RX ADMIN — MAGNESIUM SULFATE HEPTAHYDRATE 2 G: 40 INJECTION, SOLUTION INTRAVENOUS at 05:04

## 2019-04-26 RX ADMIN — FUROSEMIDE 40 MG: 10 INJECTION, SOLUTION INTRAMUSCULAR; INTRAVENOUS at 12:04

## 2019-04-26 RX ADMIN — RIFAMPIN 300 MG: 600 INJECTION, POWDER, LYOPHILIZED, FOR SOLUTION INTRAVENOUS at 05:04

## 2019-04-26 NOTE — SUBJECTIVE & OBJECTIVE
Interval History:   No acute events overnight  Appears more alert this am  Responds to questions and commands  Failed SBT yesterday    Review of Systems  Objective:     Temp:  [98 °F (36.7 °C)-99.9 °F (37.7 °C)] 99.9 °F (37.7 °C)  Pulse:  [66-90] 83  Resp:  [16-18] 16  SpO2:  [97 %-100 %] 99 %  BP: (127-170)/(59-82) 153/68     Body mass index is 27.46 kg/m².           Drains     Drain                 Closed/Suction Drain 04/21/19 0300 LLQ 5 days         Urethral Catheter 04/20/19 1711 Latex 16 Fr. 5 days         NG/OG Tube 04/21/19 0728 Center mouth 4 days         Nephrostomy 04/21/19 0629 Left 8 Fr. 4 days         Rectal Tube 04/21/19 2100 rectal tube w/ balloon (indicate number of mLs) 4 days                Physical Exam   Constitutional: She appears well-developed and well-nourished. No distress. She is sedated and intubated.   HENT:   Head: Normocephalic and atraumatic.   Neck: No JVD present.   Cardiovascular: Normal rate and regular rhythm.    Pulmonary/Chest: She is intubated.   mechanically ventilated   Abdominal: Soft. She exhibits distension (improving). There is no rebound and no guarding.   Dressing c/d/i  MADHURI drain with minimal SS output   Genitourinary:   Genitourinary Comments: Fontenot draining clear yellow  Left neph tube with clear yellow urine   Neurological: She is alert.   Sedated   Skin: She is not diaphoretic. No pallor.         Significant Labs:    BMP:  Recent Labs   Lab 04/24/19  0420 04/25/19  0300 04/26/19  0330   * 146* 146*   K 4.0 4.2 3.5   * 115* 112*   CO2 18* 18* 25   BUN 41* 38* 33*   CREATININE 1.5* 1.1 1.1   CALCIUM 8.5* 8.8 9.3       CBC:   Recent Labs   Lab 04/24/19  0420 04/24/19  2208 04/25/19  0300 04/26/19  0330   WBC 12.49  --  11.87 14.85*   HGB 8.3*  --  8.6* 8.7*   HCT 26.4* 26* 27.8* 27.5*   *  --  104* 140*       All pertinent labs results from the past 24 hours have been reviewed.    Significant Imaging:  All pertinent imaging results/findings from the  past 24 hours have been reviewed.

## 2019-04-26 NOTE — PLAN OF CARE
Problem: Adult Inpatient Plan of Care  Goal: Plan of Care Review  Outcome: Ongoing (interventions implemented as appropriate)  POC reviewed with Pt and spouse. Pt is awake and alert. Pt follows commands. Pts on AC VC+ 40/5. Pts HR has been 80s. SBP has been 150s-160s. MAPs>65. CVP 6-9. CO2 30-35. Pain being controlled with PRN meds. MADHURI intact with small amount of serous fluid. Nephrostomy tube intact with moderate amount of yellow drainage. Fontenot intact with adequate U/O of 60-250cc/hr. Pt tolerating TFs at 30cc/hr with small residuals. Bath given. SCDs and heel boots on. Spouse at bedside. VSS. Will continue to monitor.

## 2019-04-26 NOTE — ASSESSMENT & PLAN NOTE
ASSESSMENT/PLAN:   Mariann Huff is a 59 y.o. female s/p left ureteral injury on 4/12, who presented with fever, AMS, and intraabdominal abscess, taken for washout and ligation of left ureter with nephrostomy tube placement on 4/21.     Neuro:   - Sedated with precedex gtt.   - Pain control with fentanyl prn     Pulmonary:   - Intubated and sedated on precedex gtt.    - Daily CXR.  CXR 4/25 shows worsening airspace consolidation b/l.     - Lasix 40 IV given with excellent response  - ABGs prn   - Will put back on PAV+ this AM, see how she does.    Cardiac:   - HDS at this time.  Pt has been weaned off pressors.   - TTE ordered yesterday, EF 55%    Renal:    - S/p transection of left ureter 4/12 and subsequent ligation and PCT nephrostomy tube placement with IR 4/21  - Renal function improving. BUN 38 and Cr 1.1 today.    - UOP 6 L total  - Monitor I/Os    ID:   - Blood cultures 4/12 +E. Coli; sensitivities pending. Repeat Bld Cx show NGTD.   - UCx from 4/20 growing E. Coli; sensitivities are now back. Repeat Cx pending.   - ID following for assistance with abx therapy, currently on Ceftriaxone and Rifampin. Will need long term antibiotic therapy and ID follow-up.   - White count up today, likely 2/2 diuresis    Hem/Onc:   - H/H stable at this time; cont to monitor    Endocrine:  - DM Type 2 at baseline    - Endocrine following for assistance with blood glucose control.     Fluids/Electrolytes/Nutrition/GI:   # Shock Liver   - Resolved    - Labs continue to show improvement   - Elevated LFTs now down trending; ;    - Thrombocytopenia; plts count improving; [id number]    - INR normalized to 0.9    # Lactic Acidosis   - Now resolved    # Diarrhea   - Pt with multiple episodes of loose watery stool resembling urine noted from rectal tube; 800 ml watery stool overnight   - C.Diff panel Negative   - Concerns for possible fistula as fluid resembling urine in color.  Sample from rectal tube to for Cr  analysis; awaiting results.    - MADHURI drain also with yellow fluid resembling urine.  Sample sent for Cr analysis, 1.7.     - Increasing TFs  - Start free water flushes 300 cc q6h  - Replace electrolytes PRN      PPx:  - DVT: Lovenox       DISPO:  - Continue SICU care  - Need to get off vent

## 2019-04-26 NOTE — PLAN OF CARE
Reviewed BG levels and insulin regimen.      Goal BG while inpatient: 140-180 mg/dl  Current BG levels are trend up above goal  Still intubated on TF    Recommendations:   - Start schedule novolog 3U Q4hr   - Low dose correction scale  - POCT glucose Q4hr       We will follow with you, call with any questions    Charly Kim MD  Endocrine Fellow  4/26/2019

## 2019-04-26 NOTE — SUBJECTIVE & OBJECTIVE
Interval History/Significant Events: Failed PAV+ after an hour last night.  Otherwise no acute events    Follow-up For: Procedure(s) (LRB):  Creation, Nephrostomy, Percutaneous (Left)    Post-Operative Day: 5 Days Post-Op    Objective:     Vital Signs (Most Recent):  Temp: 99.7 °F (37.6 °C) (04/26/19 0700)  Pulse: 86 (04/26/19 0745)  Resp: 16 (04/26/19 0301)  BP: (!) 155/70 (04/26/19 0730)  SpO2: 100 % (04/26/19 0745) Vital Signs (24h Range):  Temp:  [98.8 °F (37.1 °C)-99.9 °F (37.7 °C)] 99.7 °F (37.6 °C)  Pulse:  [66-90] 86  Resp:  [16-18] 16  SpO2:  [97 %-100 %] 100 %  BP: (127-168)/(59-82) 155/70     Weight: 72.6 kg (160 lb)  Body mass index is 27.46 kg/m².      Intake/Output Summary (Last 24 hours) at 4/26/2019 0855  Last data filed at 4/26/2019 0800  Gross per 24 hour   Intake 2324.66 ml   Output 7257 ml   Net -4932.34 ml       Physical Exam   Constitutional: She appears well-developed and well-nourished.   HENT:   Head: Normocephalic and atraumatic.   Intubated   Eyes: Right eye exhibits no discharge. Left eye exhibits no discharge.   Neck: Normal range of motion. Neck supple.   Cardiovascular: Normal rate and regular rhythm.   Pulmonary/Chest: Effort normal. No respiratory distress.   Intubated   Abdominal:   Midline incision c/d/i.  MADHURI drainage yellow.  Abdomen soft, non-distended. Rectal tube with watery output. OG tube in place.    Genitourinary:   Genitourinary Comments: Nephrostomy tube in place with watery yellow output.  Fontenot catheter+   Musculoskeletal: Normal range of motion.   Neurological:   Sedated on precedex gtt   Skin: Skin is warm and dry.   Vitals reviewed.      Vents:  Vent Mode: A/C (04/26/19 0723)  Set Rate: 10 bmp (04/26/19 0723)  Vt Set: 420 mL (04/26/19 0723)  Pressure Support: 20 cmH20 (04/24/19 1449)  PEEP/CPAP: 5 cmH20 (04/26/19 0723)  Oxygen Concentration (%): 39 (04/26/19 0745)  Peak Airway Pressure: 31 cmH2O (04/26/19 0723)  Plateau Pressure: 35 cmH20 (04/26/19 0723)  Total Ve:  9.48 mL (04/26/19 0723)  F/VT Ratio<105 (RSBI): (!) 8.91 (04/23/19 0731)    Lines/Drains/Airways     Central Venous Catheter Line                 Percutaneous Central Line Insertion/Assessment - triple lumen  04/21/19 0100 right internal jugular 5 days          Drain                 Closed/Suction Drain 04/21/19 0300 LLQ 5 days         NG/OG Tube 04/21/19 0728 Center mouth 5 days         Nephrostomy 04/21/19 0629 Left 8 Fr. 5 days         Urethral Catheter 04/20/19 1711 Latex 16 Fr. 5 days         Rectal Tube 04/21/19 2100 rectal tube w/ balloon (indicate number of mLs) 4 days          Airway                 Airway - Non-Surgical 04/21/19 0029 Endotracheal Tube 5 days          Peripheral Intravenous Line                 Peripheral IV - Single Lumen 04/20/19 1710 18 G Right Antecubital 5 days                Significant Labs:    CBC/Anemia Profile:  Recent Labs   Lab 04/24/19 2208 04/25/19  0300 04/26/19  0330   WBC  --  11.87 14.85*   HGB  --  8.6* 8.7*   HCT 26* 27.8* 27.5*   PLT  --  104* 140*   MCV  --  88 85   RDW  --  14.8* 14.8*        Chemistries:  Recent Labs   Lab 04/25/19  0300 04/26/19  0330   * 146*   K 4.2 3.5   * 112*   CO2 18* 25   BUN 38* 33*   CREATININE 1.1 1.1   CALCIUM 8.8 9.3   ALBUMIN 1.6* 1.5*   PROT 5.6* 5.7*   BILITOT 7.9* 7.0*   ALKPHOS 210* 226*   * 164*   * 60*   MG 2.5 1.7   PHOS 3.0 3.1       Significant Imaging:  I have reviewed all pertinent imaging results/findings within the past 24 hours.

## 2019-04-26 NOTE — ASSESSMENT & PLAN NOTE
Mariann TRISTIN Huff is a 59 y.o. female s/p left ureteral injury on 4/12, presented with fever, AMS, and intraabdominal abscess, taken for washout and ligation of left ureter with neph tube placement on 4/21    - Management per SICU, appreciate assistance  - Vent per SICU, SBT again today and wean to extubate  - Original urine cultures with E. Coli, on Rocephin per ID recs  - Repeat blood and urine cultures no growth  - Maintain correa and neph tube at this time  - maintain MADHURI drain

## 2019-04-26 NOTE — PROGRESS NOTES
[facility]  Critical Care - Surgery  Progress Note    Patient Name: Mariann Huff  MRN: [id number]  Admission Date: 4/20/2019  Hospital Length of Stay: 6 days  Code Status: Full Code  Attending Provider: Paul Carlson MD  Primary Care Provider: Jasbir Haney MD   Principal Problem: Ureteral transection of left native kidney    Subjective:     Hospital/ICU Course:  No notes on file    Interval History/Significant Events: Failed PAV+ after an hour last night.  Otherwise no acute events    Follow-up For: Procedure(s) (LRB):  Creation, Nephrostomy, Percutaneous (Left)    Post-Operative Day: 5 Days Post-Op    Objective:     Vital Signs (Most Recent):  Temp: 99.7 °F (37.6 °C) (04/26/19 0700)  Pulse: 86 (04/26/19 0745)  Resp: 16 (04/26/19 0301)  BP: (!) 155/70 (04/26/19 0730)  SpO2: 100 % (04/26/19 0745) Vital Signs (24h Range):  Temp:  [98.8 °F (37.1 °C)-99.9 °F (37.7 °C)] 99.7 °F (37.6 °C)  Pulse:  [66-90] 86  Resp:  [16-18] 16  SpO2:  [97 %-100 %] 100 %  BP: (127-168)/(59-82) 155/70     Weight: 72.6 kg (160 lb)  Body mass index is 27.46 kg/m².      Intake/Output Summary (Last 24 hours) at 4/26/2019 0855  Last data filed at 4/26/2019 0800  Gross per 24 hour   Intake 2324.66 ml   Output 7257 ml   Net -4932.34 ml       Physical Exam   Constitutional: She appears well-developed and well-nourished.   HENT:   Head: Normocephalic and atraumatic.   Intubated   Eyes: Right eye exhibits no discharge. Left eye exhibits no discharge.   Neck: Normal range of motion. Neck supple.   Cardiovascular: Normal rate and regular rhythm.   Pulmonary/Chest: Effort normal. No respiratory distress.   Intubated   Abdominal:   Midline incision c/d/i.  MADHURI drainage yellow.  Abdomen soft, non-distended. Rectal tube with watery output. OG tube in place.    Genitourinary:   Genitourinary Comments: Nephrostomy tube in place with watery yellow output.  Fontenot catheter+   Musculoskeletal: Normal range of motion.   Neurological:    Sedated on precedex gtt   Skin: Skin is warm and dry.   Vitals reviewed.      Vents:  Vent Mode: A/C (04/26/19 0723)  Set Rate: 10 bmp (04/26/19 0723)  Vt Set: 420 mL (04/26/19 0723)  Pressure Support: 20 cmH20 (04/24/19 1449)  PEEP/CPAP: 5 cmH20 (04/26/19 0723)  Oxygen Concentration (%): 39 (04/26/19 0745)  Peak Airway Pressure: 31 cmH2O (04/26/19 0723)  Plateau Pressure: 35 cmH20 (04/26/19 0723)  Total Ve: 9.48 mL (04/26/19 0723)  F/VT Ratio<105 (RSBI): (!) 8.91 (04/23/19 0731)    Lines/Drains/Airways     Central Venous Catheter Line                 Percutaneous Central Line Insertion/Assessment - triple lumen  04/21/19 0100 right internal jugular 5 days          Drain                 Closed/Suction Drain 04/21/19 0300 LLQ 5 days         NG/OG Tube 04/21/19 0728 Center mouth 5 days         Nephrostomy 04/21/19 0629 Left 8 Fr. 5 days         Urethral Catheter 04/20/19 1711 Latex 16 Fr. 5 days         Rectal Tube 04/21/19 2100 rectal tube w/ balloon (indicate number of mLs) 4 days          Airway                 Airway - Non-Surgical 04/21/19 0029 Endotracheal Tube 5 days          Peripheral Intravenous Line                 Peripheral IV - Single Lumen 04/20/19 1710 18 G Right Antecubital 5 days                Significant Labs:    CBC/Anemia Profile:  Recent Labs   Lab 04/24/19 2208 04/25/19  0300 04/26/19  0330   WBC  --  11.87 14.85*   HGB  --  8.6* 8.7*   HCT 26* 27.8* 27.5*   PLT  --  104* 140*   MCV  --  88 85   RDW  --  14.8* 14.8*        Chemistries:  Recent Labs   Lab 04/25/19  0300 04/26/19  0330   * 146*   K 4.2 3.5   * 112*   CO2 18* 25   BUN 38* 33*   CREATININE 1.1 1.1   CALCIUM 8.8 9.3   ALBUMIN 1.6* 1.5*   PROT 5.6* 5.7*   BILITOT 7.9* 7.0*   ALKPHOS 210* 226*   * 164*   * 60*   MG 2.5 1.7   PHOS 3.0 3.1       Significant Imaging:  I have reviewed all pertinent imaging results/findings within the past 24 hours.    Assessment/Plan:     * Ureteral transection of left native  kidney    ASSESSMENT/PLAN:   Mariann Huff is a 59 y.o. female s/p left ureteral injury on 4/12, who presented with fever, AMS, and intraabdominal abscess, taken for washout and ligation of left ureter with nephrostomy tube placement on 4/21.     Neuro:   - Sedated with precedex gtt.   - Pain control with fentanyl prn     Pulmonary:   - Intubated and sedated on precedex gtt.    - Daily CXR.  CXR 4/25 shows worsening airspace consolidation b/l.     - Lasix 40 IV given with excellent response  - ABGs prn   - Will put back on PAV+ this AM, see how she does.    Cardiac:   - HDS at this time.  Pt has been weaned off pressors.   - TTE ordered yesterday, EF 55%    Renal:    - S/p transection of left ureter 4/12 and subsequent ligation and PCT nephrostomy tube placement with IR 4/21  - Renal function improving. BUN 38 and Cr 1.1 today.    - UOP 6 L total  - Monitor I/Os    ID:   - Blood cultures 4/12 +E. Coli; sensitivities pending. Repeat Bld Cx show NGTD.   - UCx from 4/20 growing E. Coli; sensitivities are now back. Repeat Cx pending.   - ID following for assistance with abx therapy, currently on Ceftriaxone and Rifampin. Will need long term antibiotic therapy and ID follow-up.   - White count up today, likely 2/2 diuresis    Hem/Onc:   - H/H stable at this time; cont to monitor    Endocrine:  - DM Type 2 at baseline    - Endocrine following for assistance with blood glucose control.     Fluids/Electrolytes/Nutrition/GI:   # Shock Liver   - Resolved    - Labs continue to show improvement   - Elevated LFTs now down trending; ;    - Thrombocytopenia; plts count improving; [id number]    - INR normalized to 0.9    # Lactic Acidosis   - Now resolved    # Diarrhea   - Pt with multiple episodes of loose watery stool resembling urine noted from rectal tube; 800 ml watery stool overnight   - C.Diff panel Negative   - Concerns for possible fistula as fluid resembling urine in color.  Sample from rectal tube to for Cr  analysis; awaiting results.    - MADHURI drain also with yellow fluid resembling urine.  Sample sent for Cr analysis, 1.7.     - Increasing TFs  - Start free water flushes 300 cc q6h  - Replace electrolytes PRN      PPx:  - DVT: Lovenox       DISPO:  - Continue SICU care  - Need to get off vent           Critical care was time spent personally by me on the following activities: development of treatment plan with patient or surrogate and bedside caregivers, discussions with consultants, evaluation of patient's response to treatment, examination of patient, ordering and performing treatments and interventions, ordering and review of laboratory studies, ordering and review of radiographic studies, pulse oximetry, re-evaluation of patient's condition.  This critical care time did not overlap with that of any other provider or involve time for any procedures.     Henry Crump MD  Critical Care - Surgery  [facility]

## 2019-04-26 NOTE — ADDENDUM NOTE
Addendum  created 04/26/19 0937 by Gabino Herrera Jr., MD    Attestation recorded in Intraprocedure, Intraprocedure Attestations filed

## 2019-04-26 NOTE — PROGRESS NOTES
[facility]  Urology  Progress Note    Patient Name: Mariann Huff  MRN: [id number]  Admission Date: 4/20/2019  Hospital Length of Stay: 6 days  Code Status: Full Code   Attending Provider: Paul Carlson MD   Primary Care Physician: Jasbir Haney MD    Subjective:     HPI:  Mariann Huff is a 59 y.o. female with history of HTN, type 2 diabetes mellitus, CAD, NSTEMI, and back pain who presents to [facility] with altered mental status and sepsis. She underwent L5-S1 OLIF with NSGY on 4/12 and had intraoperative left ureteral injury with ureteroureteral anastomosis and ureteral stent placement. She did well initially and had correa and MADHURI drain removed on 4/16. She began having chills and altered mental status 2 days ago and this has progressively worsened. No complaints of pain.     She is altered and HPI is limited. In the ED she is febrile to 103 and tachycardic and pressures are low normal. WBC is 5, creatinine is 3.6 baseline 1.0, lactate is 4.6. Cath UA concerning for infection, 3+ LE, >100 WBCs, and many bacteria on microscopy.    CT and MRI abdomen both show air with minimal fluid near the surgical site with left ureter coursing through. There is air throughout the proximal collecting system which is decompressed with JJ ureteral stent in good position.    Taken for ex lap, ligation of left ureter and left neph tube placement on 4/21/19.    Interval History:   No acute events overnight  Appears more alert this am  Responds to questions and commands  Failed SBT yesterday    Review of Systems  Objective:     Temp:  [98 °F (36.7 °C)-99.9 °F (37.7 °C)] 99.9 °F (37.7 °C)  Pulse:  [66-90] 83  Resp:  [16-18] 16  SpO2:  [97 %-100 %] 99 %  BP: (127-170)/(59-82) 153/68     Body mass index is 27.46 kg/m².           Drains     Drain                 Closed/Suction Drain 04/21/19 0300 LLQ 5 days         Urethral Catheter 04/20/19 1711 Latex 16 Fr. 5 days         NG/OG Tube 04/21/19 0728 Center mouth 4 days          Nephrostomy 04/21/19 0629 Left 8 Fr. 4 days         Rectal Tube 04/21/19 2100 rectal tube w/ balloon (indicate number of mLs) 4 days                Physical Exam   Constitutional: She appears well-developed and well-nourished. No distress. She is sedated and intubated.   HENT:   Head: Normocephalic and atraumatic.   Neck: No JVD present.   Cardiovascular: Normal rate and regular rhythm.    Pulmonary/Chest: She is intubated.   mechanically ventilated   Abdominal: Soft. She exhibits distension (improving). There is no rebound and no guarding.   Dressing c/d/i  MADHURI drain with minimal SS output   Genitourinary:   Genitourinary Comments: Correa draining clear yellow  Left neph tube with clear yellow urine   Neurological: She is alert.   Sedated   Skin: She is not diaphoretic. No pallor.         Significant Labs:    BMP:  Recent Labs   Lab 04/24/19  0420 04/25/19  0300 04/26/19  0330   * 146* 146*   K 4.0 4.2 3.5   * 115* 112*   CO2 18* 18* 25   BUN 41* 38* 33*   CREATININE 1.5* 1.1 1.1   CALCIUM 8.5* 8.8 9.3       CBC:   Recent Labs   Lab 04/24/19  0420 04/24/19  2208 04/25/19  0300 04/26/19  0330   WBC 12.49  --  11.87 14.85*   HGB 8.3*  --  8.6* 8.7*   HCT 26.4* 26* 27.8* 27.5*   *  --  104* 140*       All pertinent labs results from the past 24 hours have been reviewed.    Significant Imaging:  All pertinent imaging results/findings from the past 24 hours have been reviewed.      Assessment/Plan:     * Ureteral transection of left native kidney  Mariann Huff is a 59 y.o. female s/p left ureteral injury on 4/12, presented with fever, AMS, and intraabdominal abscess, taken for washout and ligation of left ureter with neph tube placement on 4/21    - Management per SICU, appreciate assistance  - Vent per SICU, SBT again today and wean to extubate  - Original urine cultures with E. Coli, on Rocephin per ID recs  - Repeat blood and urine cultures no growth  - Maintain correa and neph tube at this  time  - maintain MADHURI drain    Sepsis  As above        VTE Risk Mitigation (From admission, onward)        Ordered     heparin (porcine) injection 5,000 Units  Every 8 hours      04/25/19 0730     Place sequential compression device  Until discontinued      04/20/19 2108     IP VTE HIGH RISK PATIENT  Once      04/20/19 2108          Yulissa Salas MD  Urology  [facility]

## 2019-04-27 LAB
ALBUMIN SERPL BCP-MCNC: 1.4 G/DL (ref 3.5–5.2)
ALLENS TEST: ABNORMAL
ALP SERPL-CCNC: 214 U/L (ref 55–135)
ALT SERPL W/O P-5'-P-CCNC: 113 U/L (ref 10–44)
ANION GAP SERPL CALC-SCNC: 10 MMOL/L (ref 8–16)
ANISOCYTOSIS BLD QL SMEAR: SLIGHT
AST SERPL-CCNC: 44 U/L (ref 10–40)
BASOPHILS # BLD AUTO: ABNORMAL K/UL (ref 0–0.2)
BASOPHILS NFR BLD: 0 % (ref 0–1.9)
BILIRUB SERPL-MCNC: 4.4 MG/DL (ref 0.1–1)
BUN SERPL-MCNC: 30 MG/DL (ref 6–20)
CALCIUM SERPL-MCNC: 9.2 MG/DL (ref 8.7–10.5)
CHLORIDE SERPL-SCNC: 111 MMOL/L (ref 95–110)
CO2 SERPL-SCNC: 25 MMOL/L (ref 23–29)
CREAT SERPL-MCNC: 0.9 MG/DL (ref 0.5–1.4)
DELSYS: ABNORMAL
DIFFERENTIAL METHOD: ABNORMAL
EOSINOPHIL # BLD AUTO: ABNORMAL K/UL (ref 0–0.5)
EOSINOPHIL NFR BLD: 1 % (ref 0–8)
ERYTHROCYTE [DISTWIDTH] IN BLOOD BY AUTOMATED COUNT: 14.9 % (ref 11.5–14.5)
ERYTHROCYTE [SEDIMENTATION RATE] IN BLOOD BY WESTERGREN METHOD: 10 MM/H
EST. GFR  (AFRICAN AMERICAN): >60 ML/MIN/1.73 M^2
EST. GFR  (NON AFRICAN AMERICAN): >60 ML/MIN/1.73 M^2
FIO2: 40
GLUCOSE SERPL-MCNC: 191 MG/DL (ref 70–110)
HCO3 UR-SCNC: 27 MMOL/L (ref 24–28)
HCT VFR BLD AUTO: 27.6 % (ref 37–48.5)
HGB BLD-MCNC: 8.7 G/DL (ref 12–16)
HYPOCHROMIA BLD QL SMEAR: ABNORMAL
IMM GRANULOCYTES # BLD AUTO: ABNORMAL K/UL (ref 0–0.04)
IMM GRANULOCYTES NFR BLD AUTO: ABNORMAL % (ref 0–0.5)
INR PPP: 0.9 (ref 0.8–1.2)
LYMPHOCYTES # BLD AUTO: ABNORMAL K/UL (ref 1–4.8)
LYMPHOCYTES NFR BLD: 5 % (ref 18–48)
MAGNESIUM SERPL-MCNC: 1.7 MG/DL (ref 1.6–2.6)
MCH RBC QN AUTO: 27.1 PG (ref 27–31)
MCHC RBC AUTO-ENTMCNC: 31.5 G/DL (ref 32–36)
MCV RBC AUTO: 86 FL (ref 82–98)
METAMYELOCYTES NFR BLD MANUAL: 2 %
MODE: ABNORMAL
MONOCYTES # BLD AUTO: ABNORMAL K/UL (ref 0.3–1)
MONOCYTES NFR BLD: 6 % (ref 4–15)
MYELOCYTES NFR BLD MANUAL: 1 %
NEUTROPHILS NFR BLD: 83 % (ref 38–73)
NEUTS BAND NFR BLD MANUAL: 2 %
NRBC BLD-RTO: 0 /100 WBC
PCO2 BLDA: 43.8 MMHG (ref 35–45)
PEEP: 5
PH SMN: 7.4 [PH] (ref 7.35–7.45)
PHOSPHATE SERPL-MCNC: 3.1 MG/DL (ref 2.7–4.5)
PHOSPHATE SERPL-MCNC: 3.1 MG/DL (ref 2.7–4.5)
PLATELET # BLD AUTO: 161 K/UL (ref 150–350)
PLATELET BLD QL SMEAR: ABNORMAL
PMV BLD AUTO: 11.8 FL (ref 9.2–12.9)
PO2 BLDA: 123 MMHG (ref 80–100)
POC BE: 2 MMOL/L
POC SATURATED O2: 99 % (ref 95–100)
POC TCO2: 28 MMOL/L (ref 23–27)
POCT GLUCOSE: 190 MG/DL (ref 70–110)
POCT GLUCOSE: 201 MG/DL (ref 70–110)
POCT GLUCOSE: 207 MG/DL (ref 70–110)
POCT GLUCOSE: 211 MG/DL (ref 70–110)
POLYCHROMASIA BLD QL SMEAR: ABNORMAL
POTASSIUM SERPL-SCNC: 3.8 MMOL/L (ref 3.5–5.1)
PROT SERPL-MCNC: 5.8 G/DL (ref 6–8.4)
PROTHROMBIN TIME: 9.6 SEC (ref 9–12.5)
RBC # BLD AUTO: 3.21 M/UL (ref 4–5.4)
SAMPLE: ABNORMAL
SITE: ABNORMAL
SODIUM SERPL-SCNC: 146 MMOL/L (ref 136–145)
SP02: 93
TARGETS BLD QL SMEAR: ABNORMAL
VT: 420
WBC # BLD AUTO: 17.03 K/UL (ref 3.9–12.7)

## 2019-04-27 PROCEDURE — 63600175 PHARM REV CODE 636 W HCPCS: Performed by: ANESTHESIOLOGY

## 2019-04-27 PROCEDURE — 36600 WITHDRAWAL OF ARTERIAL BLOOD: CPT

## 2019-04-27 PROCEDURE — 80053 COMPREHEN METABOLIC PANEL: CPT

## 2019-04-27 PROCEDURE — 82800 BLOOD PH: CPT

## 2019-04-27 PROCEDURE — 27000221 HC OXYGEN, UP TO 24 HOURS

## 2019-04-27 PROCEDURE — C9113 INJ PANTOPRAZOLE SODIUM, VIA: HCPCS | Performed by: ANESTHESIOLOGY

## 2019-04-27 PROCEDURE — 94010 BREATHING CAPACITY TEST: CPT

## 2019-04-27 PROCEDURE — 99291 CRITICAL CARE FIRST HOUR: CPT | Mod: ,,, | Performed by: SURGERY

## 2019-04-27 PROCEDURE — 25000003 PHARM REV CODE 250: Performed by: STUDENT IN AN ORGANIZED HEALTH CARE EDUCATION/TRAINING PROGRAM

## 2019-04-27 PROCEDURE — 85610 PROTHROMBIN TIME: CPT

## 2019-04-27 PROCEDURE — 63600175 PHARM REV CODE 636 W HCPCS: Performed by: UROLOGY

## 2019-04-27 PROCEDURE — 85027 COMPLETE CBC AUTOMATED: CPT

## 2019-04-27 PROCEDURE — 99900026 HC AIRWAY MAINTENANCE (STAT)

## 2019-04-27 PROCEDURE — 27200966 HC CLOSED SUCTION SYSTEM

## 2019-04-27 PROCEDURE — 63600175 PHARM REV CODE 636 W HCPCS: Performed by: HOSPITALIST

## 2019-04-27 PROCEDURE — 63600175 PHARM REV CODE 636 W HCPCS: Performed by: STUDENT IN AN ORGANIZED HEALTH CARE EDUCATION/TRAINING PROGRAM

## 2019-04-27 PROCEDURE — 99291 PR CRITICAL CARE, E/M 30-74 MINUTES: ICD-10-PCS | Mod: ,,, | Performed by: SURGERY

## 2019-04-27 PROCEDURE — 84100 ASSAY OF PHOSPHORUS: CPT

## 2019-04-27 PROCEDURE — 94761 N-INVAS EAR/PLS OXIMETRY MLT: CPT

## 2019-04-27 PROCEDURE — 82803 BLOOD GASES ANY COMBINATION: CPT

## 2019-04-27 PROCEDURE — 25000003 PHARM REV CODE 250: Performed by: PHYSICIAN ASSISTANT

## 2019-04-27 PROCEDURE — 94003 VENT MGMT INPAT SUBQ DAY: CPT

## 2019-04-27 PROCEDURE — 25000003 PHARM REV CODE 250: Performed by: ANESTHESIOLOGY

## 2019-04-27 PROCEDURE — 63600175 PHARM REV CODE 636 W HCPCS: Performed by: PHYSICIAN ASSISTANT

## 2019-04-27 PROCEDURE — 94770 HC EXHALED C02 TEST: CPT

## 2019-04-27 PROCEDURE — 83735 ASSAY OF MAGNESIUM: CPT

## 2019-04-27 PROCEDURE — 20000000 HC ICU ROOM

## 2019-04-27 PROCEDURE — 99900035 HC TECH TIME PER 15 MIN (STAT)

## 2019-04-27 PROCEDURE — 85007 BL SMEAR W/DIFF WBC COUNT: CPT

## 2019-04-27 RX ORDER — INSULIN ASPART 100 [IU]/ML
4 INJECTION, SOLUTION INTRAVENOUS; SUBCUTANEOUS
Status: DISCONTINUED | OUTPATIENT
Start: 2019-04-27 | End: 2019-04-30

## 2019-04-27 RX ORDER — INSULIN ASPART 100 [IU]/ML
4 INJECTION, SOLUTION INTRAVENOUS; SUBCUTANEOUS
Status: DISCONTINUED | OUTPATIENT
Start: 2019-04-28 | End: 2019-04-30

## 2019-04-27 RX ORDER — LABETALOL HCL 20 MG/4 ML
10 SYRINGE (ML) INTRAVENOUS ONCE
Status: COMPLETED | OUTPATIENT
Start: 2019-04-27 | End: 2019-04-27

## 2019-04-27 RX ORDER — FUROSEMIDE 10 MG/ML
40 INJECTION INTRAMUSCULAR; INTRAVENOUS ONCE
Status: COMPLETED | OUTPATIENT
Start: 2019-04-27 | End: 2019-04-27

## 2019-04-27 RX ADMIN — MAGNESIUM SULFATE HEPTAHYDRATE 2 G: 40 INJECTION, SOLUTION INTRAVENOUS at 05:04

## 2019-04-27 RX ADMIN — INSULIN ASPART 4 UNITS: 100 INJECTION, SOLUTION INTRAVENOUS; SUBCUTANEOUS at 04:04

## 2019-04-27 RX ADMIN — POTASSIUM CHLORIDE 40 MEQ: 400 INJECTION, SOLUTION INTRAVENOUS at 05:04

## 2019-04-27 RX ADMIN — CHLORHEXIDINE GLUCONATE 0.12% ORAL RINSE 15 ML: 1.2 LIQUID ORAL at 09:04

## 2019-04-27 RX ADMIN — LABETALOL HCL IV SOLN PREFILLED SYRINGE 20 MG/4ML (5 MG/ML) 10 MG: 20/4 SOLUTION PREFILLED SYRINGE at 11:04

## 2019-04-27 RX ADMIN — DEXMEDETOMIDINE HYDROCHLORIDE 0.9 MCG/KG/HR: 400 INJECTION INTRAVENOUS at 08:04

## 2019-04-27 RX ADMIN — HEPARIN SODIUM 5000 UNITS: 5000 INJECTION, SOLUTION INTRAVENOUS; SUBCUTANEOUS at 05:04

## 2019-04-27 RX ADMIN — DEXMEDETOMIDINE HYDROCHLORIDE 0.2 MCG/KG/HR: 400 INJECTION INTRAVENOUS at 02:04

## 2019-04-27 RX ADMIN — CHLORHEXIDINE GLUCONATE 0.12% ORAL RINSE 15 ML: 1.2 LIQUID ORAL at 08:04

## 2019-04-27 RX ADMIN — INSULIN ASPART 3 UNITS: 100 INJECTION, SOLUTION INTRAVENOUS; SUBCUTANEOUS at 05:04

## 2019-04-27 RX ADMIN — INSULIN ASPART 3 UNITS: 100 INJECTION, SOLUTION INTRAVENOUS; SUBCUTANEOUS at 07:04

## 2019-04-27 RX ADMIN — INSULIN ASPART 4 UNITS: 100 INJECTION, SOLUTION INTRAVENOUS; SUBCUTANEOUS at 09:04

## 2019-04-27 RX ADMIN — HYDRALAZINE HYDROCHLORIDE 10 MG: 20 INJECTION INTRAMUSCULAR; INTRAVENOUS at 09:04

## 2019-04-27 RX ADMIN — RIFAMPIN 300 MG: 600 INJECTION, POWDER, LYOPHILIZED, FOR SOLUTION INTRAVENOUS at 02:04

## 2019-04-27 RX ADMIN — INSULIN ASPART 2 UNITS: 100 INJECTION, SOLUTION INTRAVENOUS; SUBCUTANEOUS at 07:04

## 2019-04-27 RX ADMIN — PANTOPRAZOLE SODIUM 40 MG: 40 INJECTION, POWDER, LYOPHILIZED, FOR SOLUTION INTRAVENOUS at 08:04

## 2019-04-27 RX ADMIN — RIFAMPIN 300 MG: 600 INJECTION, POWDER, LYOPHILIZED, FOR SOLUTION INTRAVENOUS at 06:04

## 2019-04-27 RX ADMIN — FENTANYL CITRATE 50 MCG: 50 INJECTION INTRAMUSCULAR; INTRAVENOUS at 03:04

## 2019-04-27 RX ADMIN — FUROSEMIDE 40 MG: 10 INJECTION, SOLUTION INTRAMUSCULAR; INTRAVENOUS at 09:04

## 2019-04-27 RX ADMIN — CEFTRIAXONE 2 G: 2 INJECTION, SOLUTION INTRAVENOUS at 03:04

## 2019-04-27 RX ADMIN — HEPARIN SODIUM 5000 UNITS: 5000 INJECTION, SOLUTION INTRAVENOUS; SUBCUTANEOUS at 09:04

## 2019-04-27 RX ADMIN — FENTANYL CITRATE 50 MCG: 50 INJECTION INTRAMUSCULAR; INTRAVENOUS at 01:04

## 2019-04-27 RX ADMIN — INSULIN ASPART 2 UNITS: 100 INJECTION, SOLUTION INTRAVENOUS; SUBCUTANEOUS at 05:04

## 2019-04-27 RX ADMIN — INSULIN ASPART 4 UNITS: 100 INJECTION, SOLUTION INTRAVENOUS; SUBCUTANEOUS at 12:04

## 2019-04-27 RX ADMIN — HEPARIN SODIUM 5000 UNITS: 5000 INJECTION, SOLUTION INTRAVENOUS; SUBCUTANEOUS at 01:04

## 2019-04-27 NOTE — ASSESSMENT & PLAN NOTE
59F with recent L5-S1 OLIF with intraoperative ureteral injury, repaired by urology intraoperatively, with stent placement that presents with AMS, fevers, concern for sepsis. Urinalysis with innumerable WBCs and bacteria, now s/p L ureteral ligation and nephrostomy tube placement     Neuro stable.No neurosurgical intervention   Wean sedation for neuro checks.   CV- goal normotension.    Pulm- intubated, WTE when safe.   FENGI- goal eunatremia.   Heme/ID- recommendation for sepsis treatment. On Ceftriaxone and Rifampin. Continue IV for possible OM for 4-6 weeks  Renal- f/u urology recommendations    Dispo- will follow closely.

## 2019-04-27 NOTE — PROGRESS NOTES
[facility]  Urology  Progress Note    Patient Name: Mariann Huff  MRN: [id number]  Admission Date: 4/20/2019  Hospital Length of Stay: 7 days  Code Status: Full Code   Attending Provider: Paul Carlson MD   Primary Care Physician: Jasbir Haney MD    Subjective:     HPI:  Mariann Huff is a 59 y.o. female with history of HTN, type 2 diabetes mellitus, CAD, NSTEMI, and back pain who presents to [place] with altered mental status and sepsis. She underwent L5-S1 OLIF with NSGY on 4/12 and had intraoperative left ureteral injury with ureteroureteral anastomosis and ureteral stent placement. She did well initially and had correa and MADHURI drain removed on 4/16. She began having chills and altered mental status 2 days ago and this has progressively worsened. No complaints of pain.     She is altered and HPI is limited. In the ED she is febrile to 103 and tachycardic and pressures are low normal. WBC is 5, creatinine is 3.6 baseline 1.0, lactate is 4.6. Cath UA concerning for infection, 3+ LE, >100 WBCs, and many bacteria on microscopy.    CT and MRI abdomen both show air with minimal fluid near the surgical site with left ureter coursing through. There is air throughout the proximal collecting system which is decompressed with JJ ureteral stent in good position.    Taken for ex lap, ligation of left ureter and left neph tube placement on 4/21/19.    Interval History:     JADA  Diuresing  Remains intubated     Review of Systems  Objective:     Temp:  [98.8 °F (37.1 °C)-99.7 °F (37.6 °C)] 98.8 °F (37.1 °C)  Pulse:  [] 92  Resp:  [13-15] 13  SpO2:  [93 %-100 %] 100 %  BP: (147-184)/() 176/82     Body mass index is 27.46 kg/m².    Date 04/27/19 0700 - 04/28/19 0659   Shift 6666-5246 9198-6873 [id number] 24 Hour Total   INTAKE   NG/GT 80   80   Shift Total(mL/kg) 80(1.1)   80(1.1)   OUTPUT   Urine(mL/kg/hr) 275   275   Drains 28   28   Shift Total(mL/kg) 303(4.2)   303(4.2)   Weight (kg) 72.6 72.6  72.6 72.6          Drains     Drain                 Closed/Suction Drain 04/21/19 0300 LLQ 6 days         NG/OG Tube 04/21/19 0728 Center mouth 6 days         Nephrostomy 04/21/19 0629 Left 8 Fr. 6 days         Urethral Catheter 04/20/19 1711 Latex 16 Fr. 6 days         Rectal Tube 04/21/19 2100 rectal tube w/ balloon (indicate number of mLs) 5 days                Physical Exam   Constitutional: She appears well-developed and well-nourished. No distress. She is intubated.   HENT:   Head: Normocephalic and atraumatic.   Neck: No JVD present.   Cardiovascular: Normal rate and regular rhythm.    Pulmonary/Chest: She is intubated.   mechanically ventilated   Abdominal: Soft. She exhibits no distension. There is no rebound and no guarding.   Dressing c/d/i  MADHURI drain with serous output   Genitourinary:   Genitourinary Comments: Fontenot draining clear yellow  Left neph tube with clear yellow urine   Neurological: She is alert.   Skin: Skin is warm and dry. She is not diaphoretic. No pallor.         Significant Labs:    BMP:  Recent Labs   Lab 04/25/19  0300 04/26/19  0330 04/27/19  0338   * 146* 146*   K 4.2 3.5 3.8   * 112* 111*   CO2 18* 25 25   BUN 38* 33* 30*   CREATININE 1.1 1.1 0.9   CALCIUM 8.8 9.3 9.2       CBC:   Recent Labs   Lab 04/25/19  0300 04/26/19  0330 04/27/19  0338   WBC 11.87 14.85* 17.03*   HGB 8.6* 8.7* 8.7*   HCT 27.8* 27.5* 27.6*   * 140* 161       All pertinent labs results from the past 24 hours have been reviewed.    Significant Imaging:  All pertinent imaging results/findings from the past 24 hours have been reviewed.                  Assessment/Plan:     * Ureteral transection of left native kidney  Mariann TRISTIN Huff is a 59 y.o. female s/p left ureteral injury on 4/12, presented with fever, AMS, and intraabdominal abscess, taken for washout and ligation of left ureter with neph tube placement on 4/21    - Management per SICU  - Vent per SICU, SBT again today and wean to  extubate  - Original urine cultures with E. Coli, wound culture growing staph lugdeensis on Rocephin per ID recs  - Repeat blood and urine cultures no growth  - Maintain correa and neph tube at this time  - maintain MADHURI drain    Sepsis  As above        VTE Risk Mitigation (From admission, onward)        Ordered     heparin (porcine) injection 5,000 Units  Every 8 hours      04/25/19 0730     Place sequential compression device  Until discontinued      04/20/19 2108     IP VTE HIGH RISK PATIENT  Once      04/20/19 2108          Abraham Landin MD  Urology  [facility]

## 2019-04-27 NOTE — SUBJECTIVE & OBJECTIVE
Interval History/Significant Events: NAEO. Diuresis of 4L yesterday. PAV+ @ 70 overnight.     Follow-up For: Procedure(s) (LRB):  Creation, Nephrostomy, Percutaneous (Left)    Post-Operative Day: 6 Days Post-Op    Objective:     Vital Signs (Most Recent):  Temp: 98.8 °F (37.1 °C) (04/27/19 0700)  Pulse: 104 (04/27/19 0945)  Resp: 13 (04/27/19 0301)  BP: (!) 168/75 (04/27/19 0930)  SpO2: 97 % (04/27/19 0945) Vital Signs (24h Range):  Temp:  [98.8 °F (37.1 °C)-99.7 °F (37.6 °C)] 98.8 °F (37.1 °C)  Pulse:  [] 104  Resp:  [13-15] 13  SpO2:  [93 %-100 %] 97 %  BP: (147-203)/() 168/75     Weight: 72.6 kg (160 lb)  Body mass index is 27.46 kg/m².      Intake/Output Summary (Last 24 hours) at 4/27/2019 1037  Last data filed at 4/27/2019 1000  Gross per 24 hour   Intake 2138 ml   Output 5356 ml   Net -3218 ml       Physical Exam   Constitutional: She appears well-developed and well-nourished.   HENT:   Head: Normocephalic and atraumatic.   Intubated   Eyes: Right eye exhibits no discharge. Left eye exhibits no discharge.   Neck: Normal range of motion. Neck supple.   Cardiovascular: Normal rate and regular rhythm.   Pulmonary/Chest: Effort normal. No respiratory distress.   Intubated   Abdominal:   Midline incision c/d/i.  MADHURI drainage yellow.  Abdomen soft, non-distended. Rectal tube with watery output. OG tube in place.    Genitourinary:   Genitourinary Comments: Nephrostomy tube in place with watery yellow output.  Fontenot catheter+   Musculoskeletal: Normal range of motion.   Neurological:   Sedated on precedex gtt   Skin: Skin is warm and dry.   Vitals reviewed.      Vents:  Vent Mode: PAV+ (04/27/19 0910)  Set Rate: 10 bmp (04/27/19 0745)  Vt Set: 420 mL (04/27/19 0745)  Pressure Support: 20 cmH20 (04/24/19 1449)  PEEP/CPAP: 5 cmH20 (04/27/19 0910)  Oxygen Concentration (%): 39 (04/27/19 0945)  Peak Airway Pressure: 17 cmH2O (04/27/19 0910)  Plateau Pressure: 35 cmH20 (04/27/19 0910)  Total Ve: 7.62 mL  (04/27/19 0910)  F/VT Ratio<105 (RSBI): (!) 8.91 (04/23/19 0731)    Lines/Drains/Airways     Central Venous Catheter Line                 Percutaneous Central Line Insertion/Assessment - triple lumen  04/21/19 0100 right internal jugular 6 days          Drain                 Closed/Suction Drain 04/21/19 0300 LLQ 6 days         NG/OG Tube 04/21/19 0728 Center mouth 6 days         Nephrostomy 04/21/19 0629 Left 8 Fr. 6 days         Urethral Catheter 04/20/19 1711 Latex 16 Fr. 6 days         Rectal Tube 04/21/19 2100 rectal tube w/ balloon (indicate number of mLs) 5 days          Airway                 Airway - Non-Surgical 04/21/19 0029 Endotracheal Tube 6 days                Significant Labs:    ABG:  Recent Labs     04/27/19  0344   PH 7.398   PCO2 43.8   PO2 123*   HCO3 27.0   POCSATURATED 99   BE 2       CBC/Anemia Profile:  Recent Labs   Lab 04/26/19  0330 04/27/19  0338   WBC 14.85* 17.03*   HGB 8.7* 8.7*   HCT 27.5* 27.6*   * 161   MCV 85 86   RDW 14.8* 14.9*        Chemistries:  Recent Labs   Lab 04/26/19  0330 04/27/19  0338   * 146*   K 3.5 3.8   * 111*   CO2 25 25   BUN 33* 30*   CREATININE 1.1 0.9   CALCIUM 9.3 9.2   ALBUMIN 1.5* 1.4*   PROT 5.7* 5.8*   BILITOT 7.0* 4.4*   ALKPHOS 226* 214*   * 113*   AST 60* 44*   MG 1.7 1.7   PHOS 3.1 3.1  3.1       Significant Imaging:  I have reviewed all pertinent imaging results/findings within the past 24 hours.

## 2019-04-27 NOTE — PROGRESS NOTES
Subjective/Objective  Interval History/Significant Events: NAEO. Diuresis of 4L yesterday. PAV+ @ 70 overnight.     Follow-up For: Procedure(s) (LRB):  Creation, Nephrostomy, Percutaneous (Left)    Post-Operative Day: 6 Days Post-Op    Objective:     Vital Signs (Most Recent):  Temp: 98.8 °F (37.1 °C) (04/27/19 0700)  Pulse: 104 (04/27/19 0945)  Resp: 13 (04/27/19 0301)  BP: (!) 168/75 (04/27/19 0930)  SpO2: 97 % (04/27/19 0945) Vital Signs (24h Range):  Temp:  [98.8 °F (37.1 °C)-99.7 °F (37.6 °C)] 98.8 °F (37.1 °C)  Pulse:  [] 104  Resp:  [13-15] 13  SpO2:  [93 %-100 %] 97 %  BP: (147-203)/() 168/75     Weight: 72.6 kg (160 lb)  Body mass index is 27.46 kg/m².      Intake/Output Summary (Last 24 hours) at 4/27/2019 1037  Last data filed at 4/27/2019 1000  Gross per 24 hour   Intake 2138 ml   Output 5356 ml   Net -3218 ml       Physical Exam   Constitutional: She appears well-developed and well-nourished.   HENT:   Head: Normocephalic and atraumatic.   Intubated   Eyes: Right eye exhibits no discharge. Left eye exhibits no discharge.   Neck: Normal range of motion. Neck supple.   Cardiovascular: Normal rate and regular rhythm.   Pulmonary/Chest: Effort normal. No respiratory distress.   Intubated   Abdominal:   Midline incision c/d/i.  MADHURI drainage yellow.  Abdomen soft, non-distended. Rectal tube with watery output. OG tube in place.    Genitourinary:   Genitourinary Comments: Nephrostomy tube in place with watery yellow output.  Fontenot catheter+   Musculoskeletal: Normal range of motion.   Neurological:   Sedated on precedex gtt   Skin: Skin is warm and dry.   Vitals reviewed.      Vents:  Vent Mode: PAV+ (04/27/19 0910)  Set Rate: 10 bmp (04/27/19 0745)  Vt Set: 420 mL (04/27/19 0745)  Pressure Support: 20 cmH20 (04/24/19 1449)  PEEP/CPAP: 5 cmH20 (04/27/19 0910)  Oxygen Concentration (%): 39 (04/27/19 0945)  Peak Airway Pressure: 17 cmH2O (04/27/19 0910)  Plateau Pressure: 35 cmH20 (04/27/19  0910)  Total Ve: 7.62 mL (04/27/19 0910)  F/VT Ratio<105 (RSBI): (!) 8.91 (04/23/19 0731)    Lines/Drains/Airways     Central Venous Catheter Line                 Percutaneous Central Line Insertion/Assessment - triple lumen  04/21/19 0100 right internal jugular 6 days          Drain                 Closed/Suction Drain 04/21/19 0300 LLQ 6 days         NG/OG Tube 04/21/19 0728 Center mouth 6 days         Nephrostomy 04/21/19 0629 Left 8 Fr. 6 days         Urethral Catheter 04/20/19 1711 Latex 16 Fr. 6 days         Rectal Tube 04/21/19 2100 rectal tube w/ balloon (indicate number of mLs) 5 days          Airway                 Airway - Non-Surgical 04/21/19 0029 Endotracheal Tube 6 days                Significant Labs:    ABG:  Recent Labs     04/27/19  0344   PH 7.398   PCO2 43.8   PO2 123*   HCO3 27.0   POCSATURATED 99   BE 2       CBC/Anemia Profile:  Recent Labs   Lab 04/26/19  0330 04/27/19  0338   WBC 14.85* 17.03*   HGB 8.7* 8.7*   HCT 27.5* 27.6*   * 161   MCV 85 86   RDW 14.8* 14.9*        Chemistries:  Recent Labs   Lab 04/26/19  0330 04/27/19  0338   * 146*   K 3.5 3.8   * 111*   CO2 25 25   BUN 33* 30*   CREATININE 1.1 0.9   CALCIUM 9.3 9.2   ALBUMIN 1.5* 1.4*   PROT 5.7* 5.8*   BILITOT 7.0* 4.4*   ALKPHOS 226* 214*   * 113*   AST 60* 44*   MG 1.7 1.7   PHOS 3.1 3.1  3.1       Significant Imaging:  I have reviewed all pertinent imaging results/findings within the past 24 hours.      Scheduled Meds:   cefTRIAXone (ROCEPHIN) IVPB  2 g Intravenous Q24H    chlorhexidine  15 mL Mouth/Throat BID    furosemide  40 mg Intravenous Once    heparin (porcine)  5,000 Units Subcutaneous Q8H    [START ON 4/28/2019] insulin aspart U-100  4 Units Subcutaneous Q24H    [START ON 4/28/2019] insulin aspart U-100  4 Units Subcutaneous Q24H    [START ON 4/28/2019] insulin aspart U-100  4 Units Subcutaneous Q24H    insulin aspart U-100  4 Units Subcutaneous Q24H    insulin aspart U-100  4 Units  Subcutaneous Q24H    insulin aspart U-100  4 Units Subcutaneous Q24H    pantoprazole  40 mg Intravenous Daily    rifAMpin (RIFADIN) IVPB  300 mg Intravenous Q12H     Continuous Infusions:   dexmedetomidine (PRECEDEX) infusion Stopped (19 1100)     PRN Meds:albuterol-ipratropium, calcium gluconate IVPB, calcium gluconate IVPB, calcium gluconate IVPB, dextrose 50%, [] fentaNYL **FOLLOWED BY** fentaNYL, glucagon (human recombinant), hydrALAZINE, insulin aspart U-100, magnesium sulfate IVPB, magnesium sulfate IVPB, nitroGLYCERIN, ondansetron, potassium chloride in water **AND** potassium chloride in water **AND** potassium chloride in water, promethazine (PHENERGAN) IVPB, sodium chloride 0.9%    Vital signs in last 24 hours:  Temp:  [98.8 °F (37.1 °C)-99.7 °F (37.6 °C)] 98.8 °F (37.1 °C)  Pulse:  [] 104  Resp:  [13-15] 13  SpO2:  [93 %-100 %] 97 %  BP: (147-203)/() 168/75    Intake/Output last 3 shifts:  I/O last 3 completed shifts:  In: 3188 [I.V.:428; NG/GT:2760]  Out: 8141 [Urine:6660; Drains:331; Stool:1150]  Intake/Output this shift:  I/O this shift:  In: 40 [NG/GT:40]  Out: 570 [Urine:570]    Problem Assessment/Plan  Cardiac/Vascular  CAD (coronary artery disease)  Assessment & Plan  Mariann CROW Refugio is a 59 y.o. female s/p left ureteral injury on , who presented with fever, AMS, and intraabdominal abscess, taken for washout and ligation of left ureter with nephrostomy tube placement on .      Neuro:   - Sedated with precedex gtt.   - Pain control with fentanyl prn      Pulmonary:   - Intubated and sedated on precedex gtt.    - Daily CXR, stable today            - Lasix 40 IV given with excellent response, will give another Lasix dose today  - ABGs prn   - Will attempt to wean PAV+ today, requiring 60% this am.     Cardiac:   - HDS at this time.  Pt has been weaned off pressors.   - TTE ordered yesterday, EF 55%     Renal:    - S/p transection of left ureter  and subsequent  ligation and PCT nephrostomy tube placement with IR 4/21  - Renal function improving. BUN 38 and Cr 1.1 today.    - UOP 4 L total  - Monitor I/Os    Intake/Output Summary (Last 24 hours) at 4/27/2019 1043  Last data filed at 4/27/2019 1000  Gross per 24 hour   Intake 2138 ml   Output 5356 ml   Net -3218 ml        ID:   - Blood cultures 4/12 +E. Coli; sensitivities pending. Repeat Bld Cx show NGTD.   - UCx from 4/20 growing E. Coli; sensitivities are now back. Repeat Cx pending.   - ID following for assistance with abx therapy, currently on Ceftriaxone and Rifampin. Will need long term antibiotic therapy and ID follow-up.   - White count up today, likely 2/2 diuresis     Hem/Onc:   - H/H stable at this time; cont to monitor     Endocrine:  - DM Type 2 at baseline    - Endocrine following for assistance with blood glucose control.      Fluids/Electrolytes/Nutrition/GI:   # Shock Liver          - Resolved           - Labs continue to show improvement          - Elevated LFTs now down trending; ;           - Thrombocytopenia; plts count improving; [id number]           - INR normalized to 0.9     # Lactic Acidosis          - Now resolved    # Diarrhea          - Pt with multiple episodes of loose watery stool resembling urine noted from rectal tube; 800 ml watery stool overnight          - C.Diff panel Negative          - Concerns for possible fistula as fluid resembling urine in color.  Sample from rectal tube to for Cr analysis; awaiting results.           - MADHURI drain also with yellow fluid resembling urine.  Sample sent for Cr analysis, 1.7.      - Increasing TFs  - Start free water flushes 300 cc q6h  - Replace electrolytes PRN        PPx:  - DVT: Lovenox        DISPO:  - Continue SICU care  - Continue to wean down PAV+ with hopes of extubating soon    Zoë Du MD CA-1

## 2019-04-27 NOTE — PLAN OF CARE
Reviewed BG levels and insulin regimen.      Goal BG while inpatient: 140-180 mg/dl  Current BG levels are trend up above goal  Still intubated on TF    Recommendations:   - Increase novolog 4U Q4hr   - Low dose correction scale  - POCT glucose Q4hr       We will follow with you, call with any questions    Charly Kim MD  Endocrine Fellow  4/27/2019

## 2019-04-27 NOTE — PROGRESS NOTES
Attempted weaning parameters with these results:  NIF -18: RSBI 126: Vt 234.  Pt failed weaning tests.  Notified MD and was ordered to continue current vent mode.  Will continue to monitor.

## 2019-04-27 NOTE — PLAN OF CARE
Problem: Adult Inpatient Plan of Care  Goal: Plan of Care Review  Outcome: Ongoing (interventions implemented as appropriate)  POC reviewed with Pt and spouse. Pt is awake and alert. Pt follows commands. Pts back on AC VC+ 40/5 at 2000 due to Pt increase in HR, SBP, and RR. Pts HR has been 80s. SBP has been 150s-160s. MAPs>65. CVP 2-4. CO2 35-45. Pain being controlled with PRN meds. MADHURI intact with small amount of serous fluid. Nephrostomy tube intact with moderate amount of yellow drainage. Fontenot intact with adequate U/O of 45-75cc/hr. Pt tolerating TFs at 40cc/hr with small residuals. Bath given. SCDs and heel boots on. Spouse at bedside. Plan to do another SBT today. VSS. Will continue to monitor.

## 2019-04-27 NOTE — SUBJECTIVE & OBJECTIVE
Objective:     Weight: 72.6 kg (160 lb)  Body mass index is 27.46 kg/m².  Vital Signs (Most Recent):  Temp: 97.6 °F (36.4 °C) (04/27/19 1500)  Pulse: 77 (04/27/19 1815)  Resp: (!) 32 (04/27/19 1516)  BP: 127/60 (04/27/19 1800)  SpO2: 95 % (04/27/19 1815) Vital Signs (24h Range):  Temp:  [97.6 °F (36.4 °C)-99.7 °F (37.6 °C)] 97.6 °F (36.4 °C)  Pulse:  [] 77  Resp:  [13-32] 32  SpO2:  [93 %-100 %] 95 %  BP: (127-203)/() 127/60     Date 04/27/19 0700 - 04/28/19 0659   Shift 5182-4411 9002-4523 5511-3066 24 Hour Total   INTAKE   I.V.(mL/kg)  512(7.1)  512(7.1)   NG/GT 80 160  240   Shift Total(mL/kg) 80(1.1) 672(9.3)  752(10.4)   OUTPUT   Urine(mL/kg/hr) 1590(2.7) 805  2395   Drains 28 60  88   Stool  200  200   Shift Total(mL/kg) 1618(22.3) 1065(14.7)  2683(37)   Weight (kg) 72.6 72.6 72.6 72.6              Vent Mode: A/C  Oxygen Concentration (%):  [39-40] 39  Resp Rate Total:  [11 br/min-36 br/min] 28 br/min  Vt Set:  [270 mL-450 mL] 270 mL  PEEP/CPAP:  [5 cmH20] 5 cmH20  Mean Airway Pressure:  [6.9 edA58-80 cmH20] 8.3 cmH20         Closed/Suction Drain 04/12/19 2115 Left Other (Comment) Bulb 10 Fr. (Active)            Urethral Catheter 04/12/19 1650 Non-latex;Straight-tip (Active)            Urethral Catheter 04/20/19 1711 Latex 16 Fr. (Active)   Output (mL) 900 mL 4/20/2019  5:00 PM       Physical Exam:  Nursing note and vitals reviewed.    Constitutional: She appears well-developed and well-nourished.     Eyes: Pupils are equal, round, and reactive to light.     Neurological:   Intubated, sedated- propofol.     PERRL. +corneal/cough/gag.   Wiggles toes to commands B.      Significant Labs:  Recent Labs   Lab 04/26/19 0330 04/27/19 0338   * 191*   * 146*   K 3.5 3.8   * 111*   CO2 25 25   BUN 33* 30*   CREATININE 1.1 0.9   CALCIUM 9.3 9.2   MG 1.7 1.7     Recent Labs   Lab 04/26/19 0330 04/27/19 0338   WBC 14.85* 17.03*   HGB 8.7* 8.7*   HCT 27.5* 27.6*   * 161      [facility]   Lab 04/26/19  0330 04/27/19  0338   INR 0.9 0.9     Microbiology Results (last 7 days)     Procedure Component Value Units Date/Time    Blood culture [[id number]] Collected:  04/24/19 0950    Order Status:  Completed Specimen:  Blood from Peripheral, Wrist, Right Updated:  04/27/19 1412     Blood Culture, Routine No Growth to date     Blood Culture, Routine No Growth to date     Blood Culture, Routine No Growth to date     Blood Culture, Routine No Growth to date    Blood culture [[id number]] Collected:  04/23/19 0430    Order Status:  Completed Specimen:  Blood from Peripheral, Ankle, Right Updated:  04/27/19 0822     Blood Culture, Routine No Growth to date     Blood Culture, Routine No Growth to date     Blood Culture, Routine No Growth to date     Blood Culture, Routine No Growth to date     Blood Culture, Routine No Growth to date    Narrative:       Please collect with  labs    Blood culture [[id number]] Collected:  04/23/19 0500    Order Status:  Completed Specimen:  Blood from Peripheral, Antecubital, Right Updated:  04/27/19 0822     Blood Culture, Routine No Growth to date     Blood Culture, Routine No Growth to date     Blood Culture, Routine No Growth to date     Blood Culture, Routine No Growth to date     Blood Culture, Routine No Growth to date    Narrative:       Please collect with  labs    Urine culture [[id number]] Collected:  04/25/19 0252    Order Status:  Completed Specimen:  Urine Updated:  04/26/19 1229     Urine Culture, Routine No growth    Narrative:       Preferred Collection Type->Urine, Clean Catch    Culture, Anaerobe [[id number]] Collected:  04/21/19 0125    Order Status:  Completed Specimen:  Body Fluid from Abdomen Updated:  04/25/19 1010     Anaerobic Culture No anaerobes isolated    Narrative:       Retroperitoneal fluid    Aerobic culture [[id number]]  (Susceptibility) Collected:  04/21/19 0125    Order Status:  Completed Specimen:  Body Fluid from Abdomen  Updated:  04/24/19 1501     Aerobic Bacterial Culture --     STAPHYLOCOCCUS LUGDUNENSIS  Rare      Narrative:       Retroperitoneal fluid    Blood culture [[id number]] Collected:  04/21/19 1835    Order Status:  Completed Specimen:  Blood from Peripheral, Hand, Left Updated:  04/24/19 0902     Blood Culture, Routine Gram stain aer bottle: Gram negative rods     Blood Culture, Routine Results called to and read back by: Carmenza Wallace RN 04/22/2019  10:14     Blood Culture, Routine --     ESCHERICHIA COLI  For susceptibility see order #[id number]      Blood culture x two cultures. Draw prior to antibiotics. [[id number]] Collected:  04/20/19 1711    Order Status:  Completed Specimen:  Blood from Peripheral, Antecubital, Right Updated:  04/23/19 0910     Blood Culture, Routine Gram stain aer bottle: Gram negative rods     Blood Culture, Routine Gram stain flaco bottle: Gram negative rods      Blood Culture, Routine Results called to and read back by: Pancho Mars RN  04/21/2019  03:18     Blood Culture, Routine --     ESCHERICHIA COLI  For susceptibility see order #[id number]      Narrative:       Aerobic and anaerobic    Blood culture x two cultures. Draw prior to antibiotics. [[id number]]  (Susceptibility) Collected:  04/20/19 1705    Order Status:  Completed Specimen:  Blood from Peripheral, Antecubital, Right Updated:  04/23/19 0910     Blood Culture, Routine Gram stain aer bottle: Gram negative rods     Blood Culture, Routine Gram stain flaco bottle: Gram negative rods      Blood Culture, Routine Positive results previously called 04/21/2019  06:36     Blood Culture, Routine ESCHERICHIA COLI    Narrative:       Aerobic and anaerobic    Urine culture [[id number]] Collected:  04/21/19 2256    Order Status:  Completed Specimen:  Urine Updated:  04/23/19 0648     Urine Culture, Routine No significant growth    Narrative:       Preferred Collection Type->Urine, Clean Catch  YELLOW AND GREY RECEIVED    Urine culture [[id number]]   (Susceptibility) Collected:  04/20/19 1711    Order Status:  Completed Specimen:  Urine Updated:  04/22/19 1935     Urine Culture, Routine --     ESCHERICHIA COLI  >100,000 cfu/ml      Narrative:       Preferred Collection Type->Urine, Clean Catch    Urine culture [[id number]] Collected:  04/21/19 0640    Order Status:  Completed Specimen:  Kidney, left Updated:  04/22/19 1205     Urine Culture, Routine No growth    Narrative:       Urine sample from left nephostomy  WAS TOLD BY [employer] TO RE-ORDER AS URINE CULTURE 04/21/2019    06:52      Clostridium difficile EIA [[id number]] Collected:  04/22/19 0812    Order Status:  Completed Specimen:  Stool Updated:  04/22/19 1138     C. diff Antigen Negative     C difficile Toxins A+B, EIA Negative     Comment: Testing not recommended for children <24 months old.       Blood culture [[id number]]     Order Status:  Canceled Specimen:  Blood     Gram stain [[id number]] Collected:  04/20/19 1711    Order Status:  Completed Specimen:  Urine from Catheterized Updated:  04/21/19 1328     Gram Stain Result Rare WBC's      Few Gram negative rods    Narrative:       add on GRAM #[id number] per Dr. Higuera @  04/21/2019  11:07     Gram stain [[id number]]     Order Status:  Completed Specimen:  Urine, Catheterized     Culture, Body Fluid - Bactec [[id number]] Collected:  04/21/19 0641    Order Status:  Canceled Specimen:  Body Fluid from Bile Updated:  04/21/19 0643    Culture, Body Fluid - Bactec [[id number]] Collected:  04/21/19 0641    Order Status:  Canceled Specimen:  Body Fluid from Bile Updated:  04/21/19 0643    Culture, Anaerobe [[id number]] Collected:  04/21/19 0640    Order Status:  Canceled Specimen:  Body Fluid from Back Updated:  04/21/19 0642    Aerobic culture [[id number]] Collected:  04/21/19 0640    Order Status:  Canceled Specimen:  Kidney, left from Back Updated:  04/21/19 0642        CRP:   No results for input(s): CRP in the last 48 hours.  ESR: No results for  input(s): POCESR, ERYTHROCYTES in the last 48 hours.    Significant Diagnostics:  I have reviewed all pertinent imaging results/findings within the past 24 hours.        Review of Systems

## 2019-04-27 NOTE — PROGRESS NOTES
[facility]  Neurosurgery  Progress Note    Subjective:     History of Present Illness: Ms Huff is a 59F with history of HTN, DM, CAD, NSTEMI, and recent L5-S1 OLIF on 4/12 with intraoperative L ureteral injury repaired by urology with stent that presents to ED with AMS and fevers, concerning for sepsis.  states that he noticed this morning that she was beginning to show signs of altered mental status and developed fevers. She has been ambulating around the house with a walker, no recent changes in numbness/weakness. Back and lateral incisions are clean/dry/intact.          Post-Op Info:  Procedure(s) (LRB):  Creation, Nephrostomy, Percutaneous (Left)   5 Days Post-Op     NAEON. Stable from neurological standpoint       Objective:     Weight: 72.6 kg (160 lb)  Body mass index is 27.46 kg/m².  Vital Signs (Most Recent):  Temp: 99.2 °F (37.3 °C) (04/26/19 1500)  Pulse: 102 (04/26/19 1901)  Resp: 16 (04/26/19 0301)  BP: (!) 178/80 (04/26/19 1901)  SpO2: 100 % (04/26/19 1901) Vital Signs (24h Range):  Temp:  [99.2 °F (37.3 °C)-99.9 °F (37.7 °C)] 99.2 °F (37.3 °C)  Pulse:  [] 102  Resp:  [16-17] 16  SpO2:  [97 %-100 %] 100 %  BP: (127-178)/(59-82) 178/80     Date 04/26/19 0700 - 04/27/19 0659   Shift 7158-4683 4893-2630 6454-7913 24 Hour Total   INTAKE   I.V.(mL/kg)  358(4.9)  358(4.9)   NG/ 490  770   Shift Total(mL/kg) 280(3.9) 848(11.7)  1128(15.5)   OUTPUT   Urine(mL/kg/hr) 1580(2.7) 870  2450   Drains 53   53   Stool  400  400   Shift Total(mL/kg) 1633(22.5) 1270(17.5)  2903(40)   Weight (kg) 72.6 72.6 72.6 72.6              Vent Mode: Spont  Oxygen Concentration (%):  [39-97] 39  Resp Rate Total:  [12 br/min-39 br/min] 32 br/min  Vt Set:  [420 mL] 420 mL  PEEP/CPAP:  [5 cmH20] 5 cmH20  Mean Airway Pressure:  [7.3 hfM09-63 cmH20] 7.4 cmH20         Closed/Suction Drain 04/12/19 7813 Left Other (Comment) Bulb 10 Fr. (Active)            Urethral Catheter 04/12/19 2524  Non-latex;Straight-tip (Active)            Urethral Catheter 04/20/19 1711 Latex 16 Fr. (Active)   Output (mL) 900 mL 4/20/2019  5:00 PM       Physical Exam:  Nursing note and vitals reviewed.    Constitutional: She appears well-developed and well-nourished.     Eyes: Pupils are equal, round, and reactive to light.     Neurological:   Intubated, sedated- propofol.     PERRL. +corneal/cough/gag.   Wiggles toes to commands B.      Significant Labs:  Recent Labs   Lab 04/25/19  0300 04/26/19  0330   * 182*   * 146*   K 4.2 3.5   * 112*   CO2 18* 25   BUN 38* 33*   CREATININE 1.1 1.1   CALCIUM 8.8 9.3   MG 2.5 1.7     Recent Labs   Lab 04/24/19 2208 04/25/19  0300 04/26/19  0330   WBC  --  11.87 14.85*   HGB  --  8.6* 8.7*   HCT 26* 27.8* 27.5*   PLT  --  104* 140*     Recent Labs   Lab 04/25/19  0300 04/26/19  0330   INR 0.9 0.9     Microbiology Results (last 7 days)     Procedure Component Value Units Date/Time    Blood culture [[id number]] Collected:  04/24/19 0950    Order Status:  Completed Specimen:  Blood from Peripheral, Wrist, Right Updated:  04/26/19 1412     Blood Culture, Routine No Growth to date     Blood Culture, Routine No Growth to date     Blood Culture, Routine No Growth to date    Urine culture [[id number]] Collected:  04/25/19 0252    Order Status:  Completed Specimen:  Urine Updated:  04/26/19 1229     Urine Culture, Routine No growth    Narrative:       Preferred Collection Type->Urine, Clean Catch    Blood culture [[id number]] Collected:  04/23/19 0500    Order Status:  Completed Specimen:  Blood from Peripheral, Antecubital, Right Updated:  04/26/19 0822     Blood Culture, Routine No Growth to date     Blood Culture, Routine No Growth to date     Blood Culture, Routine No Growth to date     Blood Culture, Routine No Growth to date    Narrative:       Please collect with AM labs    Blood culture [[id number]] Collected:  04/23/19 0430    Order Status:  Completed Specimen:  Blood  from Peripheral, Ankle, Right Updated:  04/26/19 0822     Blood Culture, Routine No Growth to date     Blood Culture, Routine No Growth to date     Blood Culture, Routine No Growth to date     Blood Culture, Routine No Growth to date    Narrative:       Please collect with AM labs    Culture, Anaerobe [[id number]] Collected:  04/21/19 0125    Order Status:  Completed Specimen:  Body Fluid from Abdomen Updated:  04/25/19 1010     Anaerobic Culture No anaerobes isolated    Narrative:       Retroperitoneal fluid    Aerobic culture [[id number]]  (Susceptibility) Collected:  04/21/19 0125    Order Status:  Completed Specimen:  Body Fluid from Abdomen Updated:  04/24/19 1501     Aerobic Bacterial Culture --     STAPHYLOCOCCUS LUGDUNENSIS  Rare      Narrative:       Retroperitoneal fluid    Blood culture [[id number]] Collected:  04/21/19 1835    Order Status:  Completed Specimen:  Blood from Peripheral, Hand, Left Updated:  04/24/19 0902     Blood Culture, Routine Gram stain aer bottle: Gram negative rods     Blood Culture, Routine Results called to and read back by: Carmenza Wallace RN 04/22/2019  10:14     Blood Culture, Routine --     ESCHERICHIA COLI  For susceptibility see order #[id number]      Blood culture x two cultures. Draw prior to antibiotics. [[id number]] Collected:  04/20/19 1711    Order Status:  Completed Specimen:  Blood from Peripheral, Antecubital, Right Updated:  04/23/19 0910     Blood Culture, Routine Gram stain aer bottle: Gram negative rods     Blood Culture, Routine Gram stain flaco bottle: Gram negative rods      Blood Culture, Routine Results called to and read back by: Pancho Mars RN  04/21/2019  03:18     Blood Culture, Routine --     ESCHERICHIA COLI  For susceptibility see order #[id number]      Narrative:       Aerobic and anaerobic    Blood culture x two cultures. Draw prior to antibiotics. [[id number]]  (Susceptibility) Collected:  04/20/19 1705    Order Status:  Completed Specimen:  Blood from  Peripheral, Antecubital, Right Updated:  04/23/19 0910     Blood Culture, Routine Gram stain aer bottle: Gram negative rods     Blood Culture, Routine Gram stain flaco bottle: Gram negative rods      Blood Culture, Routine Positive results previously called 04/21/2019  06:36     Blood Culture, Routine ESCHERICHIA COLI    Narrative:       Aerobic and anaerobic    Urine culture [[id number]] Collected:  04/21/19 2256    Order Status:  Completed Specimen:  Urine Updated:  04/23/19 0648     Urine Culture, Routine No significant growth    Narrative:       Preferred Collection Type->Urine, Clean Catch  YELLOW AND GREY RECEIVED    Urine culture [[id number]]  (Susceptibility) Collected:  04/20/19 1711    Order Status:  Completed Specimen:  Urine Updated:  04/22/19 1935     Urine Culture, Routine --     ESCHERICHIA COLI  >100,000 cfu/ml      Narrative:       Preferred Collection Type->Urine, Clean Catch    Urine culture [[id number]] Collected:  04/21/19 0640    Order Status:  Completed Specimen:  Kidney, left Updated:  04/22/19 1205     Urine Culture, Routine No growth    Narrative:       Urine sample from left nephostomy  WAS TOLD BY [employer] TO RE-ORDER AS URINE CULTURE 04/21/2019    06:52      Clostridium difficile EIA [[id number]] Collected:  04/22/19 0812    Order Status:  Completed Specimen:  Stool Updated:  04/22/19 1138     C. diff Antigen Negative     C difficile Toxins A+B, EIA Negative     Comment: Testing not recommended for children <24 months old.       Blood culture [[id number]]     Order Status:  Canceled Specimen:  Blood     Gram stain [[id number]] Collected:  04/20/19 1711    Order Status:  Completed Specimen:  Urine from Catheterized Updated:  04/21/19 1328     Gram Stain Result Rare WBC's      Few Gram negative rods    Narrative:       add on GRAM #[id number] per Dr. Higuera @  04/21/2019  11:07     Gram stain [[id number]]     Order Status:  Completed Specimen:  Urine, Catheterized     Culture, Body Fluid -  Bactec [[id number]] Collected:  04/21/19 0641    Order Status:  Canceled Specimen:  Body Fluid from Bile Updated:  04/21/19 0643    Culture, Body Fluid - Bactec [[id number]] Collected:  04/21/19 0641    Order Status:  Canceled Specimen:  Body Fluid from Bile Updated:  04/21/19 0643    Culture, Anaerobe [[id number]] Collected:  04/21/19 0640    Order Status:  Canceled Specimen:  Body Fluid from Back Updated:  04/21/19 0642    Aerobic culture [[id number]] Collected:  04/21/19 0640    Order Status:  Canceled Specimen:  Kidney, left from Back Updated:  04/21/19 0642    Influenza A & B by Molecular [[id number]] Collected:  04/20/19 1713    Order Status:  Completed Specimen:  Nasopharyngeal Swab Updated:  04/20/19 1804     Influenza A, Molecular Negative     Influenza B, Molecular Negative     Flu A & B Source Nasal swab        CRP:   No results for input(s): CRP in the last 48 hours.  ESR: No results for input(s): POCESR, ERYTHROCYTES in the last 48 hours.    Significant Diagnostics:  I have reviewed all pertinent imaging results/findings within the past 24 hours.        Review of Systems        Assessment/Plan:     Sepsis  59F with recent L5-S1 OLIF with intraoperative ureteral injury, repaired by urology intraoperatively, with stent placement that presents with AMS, fevers, concern for sepsis. Urinalysis with innumerable WBCs and bacteria, now s/p L ureteral ligation and nephrostomy tube placement     Neuro stable.No neurosurgical intervention   Wean sedation for neuro checks.   CV- goal normotension.    Pulm- intubated, WTE when safe.   FENGI- goal eunatremia.   Heme/ID- recommendation for sepsis treatment. On Ceftriaxone and Rifampin. Continue IV for possible OM for 4-6 weeks  Renal- f/u urology recommendations    Dispo- will follow closely.         Jeff Meade MD  Neurosurgery  [facility]

## 2019-04-27 NOTE — ASSESSMENT & PLAN NOTE
Mariann Huff is a 59 y.o. female s/p left ureteral injury on 4/12, who presented with fever, AMS, and intraabdominal abscess, taken for washout and ligation of left ureter with nephrostomy tube placement on 4/21.      Neuro:   - Sedated with precedex gtt.   - Pain control with fentanyl prn      Pulmonary:   - Intubated and sedated on precedex gtt.    - Daily CXR, stable today            - Lasix 40 IV given with excellent response, will give another Lasix dose today  - ABGs prn   - Will attempt to wean PAV+ today, requiring 60% this am.     Cardiac:   - HDS at this time.  Pt has been weaned off pressors.   - TTE ordered yesterday, EF 55%     Renal:    - S/p transection of left ureter 4/12 and subsequent ligation and PCT nephrostomy tube placement with IR 4/21  - Renal function improving. BUN 38 and Cr 1.1 today.    - UOP 4 L total  - Monitor I/Os    Intake/Output Summary (Last 24 hours) at 4/27/2019 1043  Last data filed at 4/27/2019 1000  Gross per 24 hour   Intake 2138 ml   Output 5356 ml   Net -3218 ml        ID:   - Blood cultures 4/12 +E. Coli; sensitivities pending. Repeat Bld Cx show NGTD.   - UCx from 4/20 growing E. Coli; sensitivities are now back. Repeat Cx pending.   - ID following for assistance with abx therapy, currently on Ceftriaxone and Rifampin. Will need long term antibiotic therapy and ID follow-up.   - White count up today, likely 2/2 diuresis     Hem/Onc:   - H/H stable at this time; cont to monitor     Endocrine:  - DM Type 2 at baseline    - Endocrine following for assistance with blood glucose control.      Fluids/Electrolytes/Nutrition/GI:   # Shock Liver          - Resolved           - Labs continue to show improvement          - Elevated LFTs now down trending; ;           - Thrombocytopenia; plts count improving; [id number]           - INR normalized to 0.9     # Lactic Acidosis          - Now resolved    # Diarrhea          - Pt with multiple episodes of loose watery  stool resembling urine noted from rectal tube; 800 ml watery stool overnight          - C.Diff panel Negative          - Concerns for possible fistula as fluid resembling urine in color.  Sample from rectal tube to for Cr analysis; awaiting results.           - MADHURI drain also with yellow fluid resembling urine.  Sample sent for Cr analysis, 1.7.      - Increasing TFs  - Start free water flushes 300 cc q6h  - Replace electrolytes PRN        PPx:  - DVT: Lovenox        DISPO:  - Continue SICU care  - Continue to wean down PAV+ with hopes of extubating soon    Zoë Du MD CA-1

## 2019-04-27 NOTE — PLAN OF CARE
Problem: Adult Inpatient Plan of Care  Goal: Plan of Care Review  Outcome: Ongoing (interventions implemented as appropriate)  Pt alert and awake.  Pt follows commands and moves all extremities spontaneously. Pt put on Spontaneous PAV+ @ 1200 today, sats 100%. Tolerating well. SBP goal <180 maintained throughout shift. NSR-ST 90-100s. CVP 9, 8, 6. 40 Lasix given today. MADHURI drain output ~125cc this shift and Nephrostomy tube output ~70cc. UO >2L this shift. Pt tolerating TFs at 30cc/hr with residuals ~90cc. Pt turned q2h to prevent breakdown. Plan of care reviewed. Questions and concerns addressed. Will continue to monitor.           Problem: Diabetes Comorbidity  Goal: Blood Glucose Level Within Desired Range  Outcome: Ongoing (interventions implemented as appropriate)  .    Problem: Infection  Goal: Infection Symptom Resolution  Outcome: Ongoing (interventions implemented as appropriate)  .    Problem: Communication Impairment (Mechanical Ventilation, Invasive)  Goal: Effective Communication  Outcome: Ongoing (interventions implemented as appropriate)  .    Problem: Communication Impairment (Artificial Airway)  Goal: Effective Communication  Outcome: Ongoing (interventions implemented as appropriate)  .    Problem: Spiritual Distress Risk or Actual  Goal: Spiritual Wellbeing  Outcome: Ongoing (interventions implemented as appropriate)  .

## 2019-04-27 NOTE — ASSESSMENT & PLAN NOTE
Elizaamanda Huff is a 59 y.o. female s/p left ureteral injury on 4/12, presented with fever, AMS, and intraabdominal abscess, taken for washout and ligation of left ureter with neph tube placement on 4/21    - Management per SICU  - Vent per SICU, SBT again today and wean to extubate  - Original urine cultures with E. Coli, wound culture growing staph lugdeensis on Rocephin per ID recs  - Repeat blood and urine cultures no growth  - Maintain correa and neph tube at this time  - maintain MADHURI drain

## 2019-04-27 NOTE — SUBJECTIVE & OBJECTIVE
MINDY. Stable from neurological standpoint       Objective:     Weight: 72.6 kg (160 lb)  Body mass index is 27.46 kg/m².  Vital Signs (Most Recent):  Temp: 99.2 °F (37.3 °C) (04/26/19 1500)  Pulse: 102 (04/26/19 1901)  Resp: 16 (04/26/19 0301)  BP: (!) 178/80 (04/26/19 1901)  SpO2: 100 % (04/26/19 1901) Vital Signs (24h Range):  Temp:  [99.2 °F (37.3 °C)-99.9 °F (37.7 °C)] 99.2 °F (37.3 °C)  Pulse:  [] 102  Resp:  [16-17] 16  SpO2:  [97 %-100 %] 100 %  BP: (127-178)/(59-82) 178/80     Date 04/26/19 0700 - 04/27/19 0659   Shift 6074-2634 8274-5826 7540-7312 24 Hour Total   INTAKE   I.V.(mL/kg)  358(4.9)  358(4.9)   NG/ 490  770   Shift Total(mL/kg) 280(3.9) 848(11.7)  1128(15.5)   OUTPUT   Urine(mL/kg/hr) 1580(2.7) 870  2450   Drains 53   53   Stool  400  400   Shift Total(mL/kg) 1633(22.5) 1270(17.5)  2903(40)   Weight (kg) 72.6 72.6 72.6 72.6              Vent Mode: Spont  Oxygen Concentration (%):  [39-97] 39  Resp Rate Total:  [12 br/min-39 br/min] 32 br/min  Vt Set:  [420 mL] 420 mL  PEEP/CPAP:  [5 cmH20] 5 cmH20  Mean Airway Pressure:  [7.3 lmG52-10 cmH20] 7.4 cmH20         Closed/Suction Drain 04/12/19 2115 Left Other (Comment) Bulb 10 Fr. (Active)            Urethral Catheter 04/12/19 1650 Non-latex;Straight-tip (Active)            Urethral Catheter 04/20/19 1711 Latex 16 Fr. (Active)   Output (mL) 900 mL 4/20/2019  5:00 PM       Physical Exam:  Nursing note and vitals reviewed.    Constitutional: She appears well-developed and well-nourished.     Eyes: Pupils are equal, round, and reactive to light.     Neurological:   Intubated, sedated- propofol.     PERRL. +corneal/cough/gag.   Wiggles toes to commands B.      Significant Labs:  Recent Labs   Lab 04/25/19  0300 04/26/19  0330   * 182*   * 146*   K 4.2 3.5   * 112*   CO2 18* 25   BUN 38* 33*   CREATININE 1.1 1.1   CALCIUM 8.8 9.3   MG 2.5 1.7     Recent Labs   Lab 04/24/19  2208 04/25/19  0300 04/26/19  0330   WBC  --  11.87  14.85*   HGB  --  8.6* 8.7*   HCT 26* 27.8* 27.5*   PLT  --  104* 140*     Recent Labs   Lab 04/25/19  0300 04/26/19  0330   INR 0.9 0.9     Microbiology Results (last 7 days)     Procedure Component Value Units Date/Time    Blood culture [[id number]] Collected:  04/24/19 0950    Order Status:  Completed Specimen:  Blood from Peripheral, Wrist, Right Updated:  04/26/19 1412     Blood Culture, Routine No Growth to date     Blood Culture, Routine No Growth to date     Blood Culture, Routine No Growth to date    Urine culture [[id number]] Collected:  04/25/19 0252    Order Status:  Completed Specimen:  Urine Updated:  04/26/19 1229     Urine Culture, Routine No growth    Narrative:       Preferred Collection Type->Urine, Clean Catch    Blood culture [[id number]] Collected:  04/23/19 0500    Order Status:  Completed Specimen:  Blood from Peripheral, Antecubital, Right Updated:  04/26/19 0822     Blood Culture, Routine No Growth to date     Blood Culture, Routine No Growth to date     Blood Culture, Routine No Growth to date     Blood Culture, Routine No Growth to date    Narrative:       Please collect with AM labs    Blood culture [[id number]] Collected:  04/23/19 0430    Order Status:  Completed Specimen:  Blood from Peripheral, Ankle, Right Updated:  04/26/19 0822     Blood Culture, Routine No Growth to date     Blood Culture, Routine No Growth to date     Blood Culture, Routine No Growth to date     Blood Culture, Routine No Growth to date    Narrative:       Please collect with AM labs    Culture, Anaerobe [[id number]] Collected:  04/21/19 0125    Order Status:  Completed Specimen:  Body Fluid from Abdomen Updated:  04/25/19 1010     Anaerobic Culture No anaerobes isolated    Narrative:       Retroperitoneal fluid    Aerobic culture [[id number]]  (Susceptibility) Collected:  04/21/19 0125    Order Status:  Completed Specimen:  Body Fluid from Abdomen Updated:  04/24/19 1501     Aerobic Bacterial Culture --      STAPHYLOCOCCUS LUGDUNENSIS  Rare      Narrative:       Retroperitoneal fluid    Blood culture [[id number]] Collected:  04/21/19 1835    Order Status:  Completed Specimen:  Blood from Peripheral, Hand, Left Updated:  04/24/19 0902     Blood Culture, Routine Gram stain aer bottle: Gram negative rods     Blood Culture, Routine Results called to and read back by: Carmenza Wallace RN 04/22/2019  10:14     Blood Culture, Routine --     ESCHERICHIA COLI  For susceptibility see order #[id number]      Blood culture x two cultures. Draw prior to antibiotics. [[id number]] Collected:  04/20/19 1711    Order Status:  Completed Specimen:  Blood from Peripheral, Antecubital, Right Updated:  04/23/19 0910     Blood Culture, Routine Gram stain aer bottle: Gram negative rods     Blood Culture, Routine Gram stain flaco bottle: Gram negative rods      Blood Culture, Routine Results called to and read back by: Pancho Mars RN  04/21/2019  03:18     Blood Culture, Routine --     ESCHERICHIA COLI  For susceptibility see order #[id number]      Narrative:       Aerobic and anaerobic    Blood culture x two cultures. Draw prior to antibiotics. [[id number]]  (Susceptibility) Collected:  04/20/19 1705    Order Status:  Completed Specimen:  Blood from Peripheral, Antecubital, Right Updated:  04/23/19 0910     Blood Culture, Routine Gram stain aer bottle: Gram negative rods     Blood Culture, Routine Gram stain flaco bottle: Gram negative rods      Blood Culture, Routine Positive results previously called 04/21/2019  06:36     Blood Culture, Routine ESCHERICHIA COLI    Narrative:       Aerobic and anaerobic    Urine culture [[id number]] Collected:  04/21/19 2256    Order Status:  Completed Specimen:  Urine Updated:  04/23/19 0648     Urine Culture, Routine No significant growth    Narrative:       Preferred Collection Type->Urine, Clean Catch  YELLOW AND GREY RECEIVED    Urine culture [[id number]]  (Susceptibility) Collected:  04/20/19 1711    Order Status:   Completed Specimen:  Urine Updated:  04/22/19 1935     Urine Culture, Routine --     ESCHERICHIA COLI  >100,000 cfu/ml      Narrative:       Preferred Collection Type->Urine, Clean Catch    Urine culture [[id number]] Collected:  04/21/19 0640    Order Status:  Completed Specimen:  Kidney, left Updated:  04/22/19 1205     Urine Culture, Routine No growth    Narrative:       Urine sample from left nephostomy  WAS TOLD BY [employer] TECH TO RE-ORDER AS URINE CULTURE 04/21/2019    06:52      Clostridium difficile EIA [[id number]] Collected:  04/22/19 0812    Order Status:  Completed Specimen:  Stool Updated:  04/22/19 1138     C. diff Antigen Negative     C difficile Toxins A+B, EIA Negative     Comment: Testing not recommended for children <24 months old.       Blood culture [[id number]]     Order Status:  Canceled Specimen:  Blood     Gram stain [[id number]] Collected:  04/20/19 1711    Order Status:  Completed Specimen:  Urine from Catheterized Updated:  04/21/19 1328     Gram Stain Result Rare WBC's      Few Gram negative rods    Narrative:       add on GRAM #[id number] per Dr. Higuera @  04/21/2019  11:07     Gram stain [[id number]]     Order Status:  Completed Specimen:  Urine, Catheterized     Culture, Body Fluid - Bactec [[id number]] Collected:  04/21/19 0641    Order Status:  Canceled Specimen:  Body Fluid from Bile Updated:  04/21/19 0643    Culture, Body Fluid - Bactec [[id number]] Collected:  04/21/19 0641    Order Status:  Canceled Specimen:  Body Fluid from Bile Updated:  04/21/19 0643    Culture, Anaerobe [[id number]] Collected:  04/21/19 0640    Order Status:  Canceled Specimen:  Body Fluid from Back Updated:  04/21/19 0642    Aerobic culture [[id number]] Collected:  04/21/19 0640    Order Status:  Canceled Specimen:  Kidney, left from Back Updated:  04/21/19 0642    Influenza A & B by Molecular [[id number]] Collected:  04/20/19 1713    Order Status:  Completed Specimen:  Nasopharyngeal Swab Updated:  04/20/19  1804     Influenza A, Molecular Negative     Influenza B, Molecular Negative     Flu A & B Source Nasal swab        CRP:   No results for input(s): CRP in the last 48 hours.  ESR: No results for input(s): POCESR, ERYTHROCYTES in the last 48 hours.    Significant Diagnostics:  I have reviewed all pertinent imaging results/findings within the past 24 hours.        Review of Systems

## 2019-04-27 NOTE — SUBJECTIVE & OBJECTIVE
Interval History:     JADA Jean  Remains intubated     Review of Systems  Objective:     Temp:  [98.8 °F (37.1 °C)-99.7 °F (37.6 °C)] 98.8 °F (37.1 °C)  Pulse:  [] 92  Resp:  [13-15] 13  SpO2:  [93 %-100 %] 100 %  BP: (147-184)/() 176/82     Body mass index is 27.46 kg/m².    Date 04/27/19 0700 - 04/28/19 0659   Shift 7213-8455 6367-4964 7079-5396 24 Hour Total   INTAKE   NG/GT 80   80   Shift Total(mL/kg) 80(1.1)   80(1.1)   OUTPUT   Urine(mL/kg/hr) 275   275   Drains 28   28   Shift Total(mL/kg) 303(4.2)   303(4.2)   Weight (kg) 72.6 72.6 72.6 72.6          Drains     Drain                 Closed/Suction Drain 04/21/19 0300 LLQ 6 days         NG/OG Tube 04/21/19 0728 Center mouth 6 days         Nephrostomy 04/21/19 0629 Left 8 Fr. 6 days         Urethral Catheter 04/20/19 1711 Latex 16 Fr. 6 days         Rectal Tube 04/21/19 2100 rectal tube w/ balloon (indicate number of mLs) 5 days                Physical Exam   Constitutional: She appears well-developed and well-nourished. No distress. She is intubated.   HENT:   Head: Normocephalic and atraumatic.   Neck: No JVD present.   Cardiovascular: Normal rate and regular rhythm.    Pulmonary/Chest: She is intubated.   mechanically ventilated   Abdominal: Soft. She exhibits no distension. There is no rebound and no guarding.   Dressing c/d/i  MADHURI drain with serous output   Genitourinary:   Genitourinary Comments: Fontenot draining clear yellow  Left neph tube with clear yellow urine   Neurological: She is alert.   Skin: Skin is warm and dry. She is not diaphoretic. No pallor.         Significant Labs:    BMP:  Recent Labs   Lab 04/25/19  0300 04/26/19  0330 04/27/19  0338   * 146* 146*   K 4.2 3.5 3.8   * 112* 111*   CO2 18* 25 25   BUN 38* 33* 30*   CREATININE 1.1 1.1 0.9   CALCIUM 8.8 9.3 9.2       CBC:   Recent Labs   Lab 04/25/19  0300 04/26/19  0330 04/27/19  0338   WBC 11.87 14.85* 17.03*   HGB 8.6* 8.7* 8.7*   HCT 27.8* 27.5* 27.6*   PLT  104* 140* 161       All pertinent labs results from the past 24 hours have been reviewed.    Significant Imaging:  All pertinent imaging results/findings from the past 24 hours have been reviewed.

## 2019-04-28 LAB
ALBUMIN SERPL BCP-MCNC: 1.5 G/DL (ref 3.5–5.2)
ALP SERPL-CCNC: 209 U/L (ref 55–135)
ALT SERPL W/O P-5'-P-CCNC: 84 U/L (ref 10–44)
ANION GAP SERPL CALC-SCNC: 8 MMOL/L (ref 8–16)
AST SERPL-CCNC: 47 U/L (ref 10–40)
BACTERIA BLD CULT: NORMAL
BACTERIA BLD CULT: NORMAL
BASOPHILS # BLD AUTO: 0.04 K/UL (ref 0–0.2)
BASOPHILS NFR BLD: 0.3 % (ref 0–1.9)
BILIRUB SERPL-MCNC: 3.4 MG/DL (ref 0.1–1)
BUN SERPL-MCNC: 30 MG/DL (ref 6–20)
CALCIUM SERPL-MCNC: 9.1 MG/DL (ref 8.7–10.5)
CHLORIDE SERPL-SCNC: 109 MMOL/L (ref 95–110)
CO2 SERPL-SCNC: 28 MMOL/L (ref 23–29)
CREAT SERPL-MCNC: 0.8 MG/DL (ref 0.5–1.4)
DIFFERENTIAL METHOD: ABNORMAL
EOSINOPHIL # BLD AUTO: 0.4 K/UL (ref 0–0.5)
EOSINOPHIL NFR BLD: 2.6 % (ref 0–8)
ERYTHROCYTE [DISTWIDTH] IN BLOOD BY AUTOMATED COUNT: 15.1 % (ref 11.5–14.5)
EST. GFR  (AFRICAN AMERICAN): >60 ML/MIN/1.73 M^2
EST. GFR  (NON AFRICAN AMERICAN): >60 ML/MIN/1.73 M^2
GLUCOSE SERPL-MCNC: 174 MG/DL (ref 70–110)
HCT VFR BLD AUTO: 28.4 % (ref 37–48.5)
HGB BLD-MCNC: 8.6 G/DL (ref 12–16)
IMM GRANULOCYTES # BLD AUTO: 0.77 K/UL (ref 0–0.04)
IMM GRANULOCYTES NFR BLD AUTO: 4.8 % (ref 0–0.5)
INR PPP: 0.9 (ref 0.8–1.2)
LYMPHOCYTES # BLD AUTO: 1.6 K/UL (ref 1–4.8)
LYMPHOCYTES NFR BLD: 10 % (ref 18–48)
MAGNESIUM SERPL-MCNC: 1.8 MG/DL (ref 1.6–2.6)
MCH RBC QN AUTO: 27.4 PG (ref 27–31)
MCHC RBC AUTO-ENTMCNC: 30.3 G/DL (ref 32–36)
MCV RBC AUTO: 90 FL (ref 82–98)
MONOCYTES # BLD AUTO: 0.9 K/UL (ref 0.3–1)
MONOCYTES NFR BLD: 5.6 % (ref 4–15)
NEUTROPHILS # BLD AUTO: 12.2 K/UL (ref 1.8–7.7)
NEUTROPHILS NFR BLD: 76.7 % (ref 38–73)
NRBC BLD-RTO: 0 /100 WBC
PHOSPHATE SERPL-MCNC: 3.4 MG/DL (ref 2.7–4.5)
PLATELET # BLD AUTO: 189 K/UL (ref 150–350)
PMV BLD AUTO: 12.3 FL (ref 9.2–12.9)
POCT GLUCOSE: 146 MG/DL (ref 70–110)
POCT GLUCOSE: 168 MG/DL (ref 70–110)
POCT GLUCOSE: 178 MG/DL (ref 70–110)
POCT GLUCOSE: 185 MG/DL (ref 70–110)
POCT GLUCOSE: 186 MG/DL (ref 70–110)
POCT GLUCOSE: 207 MG/DL (ref 70–110)
POCT GLUCOSE: 221 MG/DL (ref 70–110)
POTASSIUM SERPL-SCNC: 4.3 MMOL/L (ref 3.5–5.1)
PROT SERPL-MCNC: 6 G/DL (ref 6–8.4)
PROTHROMBIN TIME: 9.6 SEC (ref 9–12.5)
RBC # BLD AUTO: 3.14 M/UL (ref 4–5.4)
SODIUM SERPL-SCNC: 145 MMOL/L (ref 136–145)
WBC # BLD AUTO: 15.89 K/UL (ref 3.9–12.7)

## 2019-04-28 PROCEDURE — 85610 PROTHROMBIN TIME: CPT

## 2019-04-28 PROCEDURE — 63600175 PHARM REV CODE 636 W HCPCS: Performed by: PHYSICIAN ASSISTANT

## 2019-04-28 PROCEDURE — 99900035 HC TECH TIME PER 15 MIN (STAT)

## 2019-04-28 PROCEDURE — 99291 PR CRITICAL CARE, E/M 30-74 MINUTES: ICD-10-PCS | Mod: ,,, | Performed by: SURGERY

## 2019-04-28 PROCEDURE — 80053 COMPREHEN METABOLIC PANEL: CPT

## 2019-04-28 PROCEDURE — 84100 ASSAY OF PHOSPHORUS: CPT

## 2019-04-28 PROCEDURE — 85025 COMPLETE CBC W/AUTO DIFF WBC: CPT

## 2019-04-28 PROCEDURE — 25000003 PHARM REV CODE 250: Performed by: PHYSICIAN ASSISTANT

## 2019-04-28 PROCEDURE — 94761 N-INVAS EAR/PLS OXIMETRY MLT: CPT

## 2019-04-28 PROCEDURE — 99291 CRITICAL CARE FIRST HOUR: CPT | Mod: ,,, | Performed by: SURGERY

## 2019-04-28 PROCEDURE — 27000221 HC OXYGEN, UP TO 24 HOURS

## 2019-04-28 PROCEDURE — C9113 INJ PANTOPRAZOLE SODIUM, VIA: HCPCS | Performed by: ANESTHESIOLOGY

## 2019-04-28 PROCEDURE — 25000003 PHARM REV CODE 250: Performed by: STUDENT IN AN ORGANIZED HEALTH CARE EDUCATION/TRAINING PROGRAM

## 2019-04-28 PROCEDURE — 63600175 PHARM REV CODE 636 W HCPCS: Performed by: UROLOGY

## 2019-04-28 PROCEDURE — 83735 ASSAY OF MAGNESIUM: CPT

## 2019-04-28 PROCEDURE — 94770 HC EXHALED C02 TEST: CPT

## 2019-04-28 PROCEDURE — 63600175 PHARM REV CODE 636 W HCPCS: Performed by: ANESTHESIOLOGY

## 2019-04-28 PROCEDURE — 99900026 HC AIRWAY MAINTENANCE (STAT)

## 2019-04-28 PROCEDURE — 20000000 HC ICU ROOM

## 2019-04-28 PROCEDURE — 94003 VENT MGMT INPAT SUBQ DAY: CPT

## 2019-04-28 RX ORDER — ACETAMINOPHEN 325 MG/1
650 TABLET ORAL EVERY 6 HOURS PRN
Status: DISCONTINUED | OUTPATIENT
Start: 2019-04-28 | End: 2019-05-07

## 2019-04-28 RX ORDER — FUROSEMIDE 10 MG/ML
40 INJECTION INTRAMUSCULAR; INTRAVENOUS ONCE
Status: COMPLETED | OUTPATIENT
Start: 2019-04-28 | End: 2019-04-28

## 2019-04-28 RX ADMIN — MAGNESIUM SULFATE HEPTAHYDRATE 2 G: 40 INJECTION, SOLUTION INTRAVENOUS at 04:04

## 2019-04-28 RX ADMIN — FENTANYL CITRATE 50 MCG: 50 INJECTION INTRAMUSCULAR; INTRAVENOUS at 01:04

## 2019-04-28 RX ADMIN — ACETAMINOPHEN 650 MG: 325 TABLET ORAL at 07:04

## 2019-04-28 RX ADMIN — RIFAMPIN 300 MG: 600 INJECTION, POWDER, LYOPHILIZED, FOR SOLUTION INTRAVENOUS at 02:04

## 2019-04-28 RX ADMIN — DEXMEDETOMIDINE HYDROCHLORIDE 1 MCG/KG/HR: 400 INJECTION INTRAVENOUS at 05:04

## 2019-04-28 RX ADMIN — HEPARIN SODIUM 5000 UNITS: 5000 INJECTION, SOLUTION INTRAVENOUS; SUBCUTANEOUS at 09:04

## 2019-04-28 RX ADMIN — FUROSEMIDE 40 MG: 10 INJECTION, SOLUTION INTRAMUSCULAR; INTRAVENOUS at 03:04

## 2019-04-28 RX ADMIN — DEXMEDETOMIDINE HYDROCHLORIDE 1 MCG/KG/HR: 400 INJECTION INTRAVENOUS at 09:04

## 2019-04-28 RX ADMIN — INSULIN ASPART 4 UNITS: 100 INJECTION, SOLUTION INTRAVENOUS; SUBCUTANEOUS at 11:04

## 2019-04-28 RX ADMIN — CHLORHEXIDINE GLUCONATE 0.12% ORAL RINSE 15 ML: 1.2 LIQUID ORAL at 08:04

## 2019-04-28 RX ADMIN — ACETAMINOPHEN 650 MG: 325 TABLET ORAL at 01:04

## 2019-04-28 RX ADMIN — INSULIN ASPART 2 UNITS: 100 INJECTION, SOLUTION INTRAVENOUS; SUBCUTANEOUS at 03:04

## 2019-04-28 RX ADMIN — HEPARIN SODIUM 5000 UNITS: 5000 INJECTION, SOLUTION INTRAVENOUS; SUBCUTANEOUS at 01:04

## 2019-04-28 RX ADMIN — INSULIN ASPART 4 UNITS: 100 INJECTION, SOLUTION INTRAVENOUS; SUBCUTANEOUS at 01:04

## 2019-04-28 RX ADMIN — PANTOPRAZOLE SODIUM 40 MG: 40 INJECTION, POWDER, LYOPHILIZED, FOR SOLUTION INTRAVENOUS at 08:04

## 2019-04-28 RX ADMIN — CEFTRIAXONE 2 G: 2 INJECTION, SOLUTION INTRAVENOUS at 03:04

## 2019-04-28 RX ADMIN — INSULIN ASPART 1 UNITS: 100 INJECTION, SOLUTION INTRAVENOUS; SUBCUTANEOUS at 01:04

## 2019-04-28 RX ADMIN — INSULIN ASPART 4 UNITS: 100 INJECTION, SOLUTION INTRAVENOUS; SUBCUTANEOUS at 05:04

## 2019-04-28 RX ADMIN — DEXMEDETOMIDINE HYDROCHLORIDE 1 MCG/KG/HR: 400 INJECTION INTRAVENOUS at 01:04

## 2019-04-28 RX ADMIN — HEPARIN SODIUM 5000 UNITS: 5000 INJECTION, SOLUTION INTRAVENOUS; SUBCUTANEOUS at 06:04

## 2019-04-28 RX ADMIN — INSULIN ASPART 4 UNITS: 100 INJECTION, SOLUTION INTRAVENOUS; SUBCUTANEOUS at 07:04

## 2019-04-28 RX ADMIN — INSULIN ASPART 4 UNITS: 100 INJECTION, SOLUTION INTRAVENOUS; SUBCUTANEOUS at 08:04

## 2019-04-28 RX ADMIN — INSULIN ASPART 4 UNITS: 100 INJECTION, SOLUTION INTRAVENOUS; SUBCUTANEOUS at 03:04

## 2019-04-28 RX ADMIN — RIFAMPIN 300 MG: 600 INJECTION, POWDER, LYOPHILIZED, FOR SOLUTION INTRAVENOUS at 01:04

## 2019-04-28 RX ADMIN — FUROSEMIDE 40 MG: 10 INJECTION, SOLUTION INTRAMUSCULAR; INTRAVENOUS at 08:04

## 2019-04-28 NOTE — ASSESSMENT & PLAN NOTE
59F with recent L5-S1 OLIF with intraoperative ureteral injury, repaired by urology intraoperatively, with stent placement that presents with AMS, fevers, concern for sepsis. Urinalysis with innumerable WBCs and bacteria, now s/p L ureteral ligation and nephrostomy tube placement     Pt remains in critical condition in SICU. No focal deficit.    --Continue care per primary team.  --Continue nephrostomy management per urology.  --Neurosurgery will continue to follow peripherally for wound checks, will be immediately available for re-consult, please contact us with any questions or concerns.

## 2019-04-28 NOTE — PROGRESS NOTES
Weaning parameters obtained. NIF-18  RSBI ranges from . Team rounded and verbal to place pt back on previous vent settings. No complications, will continue to monitor.

## 2019-04-28 NOTE — PROGRESS NOTES
[facility]  Neurosurgery  Progress Note    Subjective:     History of Present Illness: Ms Huff is a 59F with history of HTN, DM, CAD, NSTEMI, and recent L5-S1 OLIF on 4/12 with intraoperative L ureteral injury repaired by urology with stent that presents to ED with AMS and fevers, concerning for sepsis.  states that he noticed this morning that she was beginning to show signs of altered mental status and developed fevers. She has been ambulating around the house with a walker, no recent changes in numbness/weakness. Back and lateral incisions are clean/dry/intact.          Post-Op Info:  Procedure(s) (LRB):  Creation, Nephrostomy, Percutaneous (Left)   6 Days Post-Op           Objective:     Weight: 72.6 kg (160 lb)  Body mass index is 27.46 kg/m².  Vital Signs (Most Recent):  Temp: 97.6 °F (36.4 °C) (04/27/19 1500)  Pulse: 77 (04/27/19 1815)  Resp: (!) 32 (04/27/19 1516)  BP: 127/60 (04/27/19 1800)  SpO2: 95 % (04/27/19 1815) Vital Signs (24h Range):  Temp:  [97.6 °F (36.4 °C)-99.7 °F (37.6 °C)] 97.6 °F (36.4 °C)  Pulse:  [] 77  Resp:  [13-32] 32  SpO2:  [93 %-100 %] 95 %  BP: (127-203)/() 127/60     Date 04/27/19 0700 - 04/28/19 0659   Shift 2149-0635 5285-5784 3523-3890 24 Hour Total   INTAKE   I.V.(mL/kg)  512(7.1)  512(7.1)   NG/GT 80 160  240   Shift Total(mL/kg) 80(1.1) 672(9.3)  752(10.4)   OUTPUT   Urine(mL/kg/hr) 1590(2.7) 805  2395   Drains 28 60  88   Stool  200  200   Shift Total(mL/kg) 1618(22.3) 1065(14.7)  2683(37)   Weight (kg) 72.6 72.6 72.6 72.6              Vent Mode: A/C  Oxygen Concentration (%):  [39-40] 39  Resp Rate Total:  [11 br/min-36 br/min] 28 br/min  Vt Set:  [270 mL-450 mL] 270 mL  PEEP/CPAP:  [5 cmH20] 5 cmH20  Mean Airway Pressure:  [6.9 jeA18-33 cmH20] 8.3 cmH20         Closed/Suction Drain 04/12/19 2115 Left Other (Comment) Bulb 10 Fr. (Active)            Urethral Catheter 04/12/19 1650 Non-latex;Straight-tip (Active)            Urethral  Catheter 04/20/19 1711 Latex 16 Fr. (Active)   Output (mL) 900 mL 4/20/2019  5:00 PM       Physical Exam:  Nursing note and vitals reviewed.    Constitutional: She appears well-developed and well-nourished.     Eyes: Pupils are equal, round, and reactive to light.     Neurological:   Intubated, sedated- propofol.     PERRL. +corneal/cough/gag.   Wiggles toes to commands B.      Significant Labs:  Recent Labs   Lab 04/26/19 0330 04/27/19 0338   * 191*   * 146*   K 3.5 3.8   * 111*   CO2 25 25   BUN 33* 30*   CREATININE 1.1 0.9   CALCIUM 9.3 9.2   MG 1.7 1.7     Recent Labs   Lab 04/26/19 0330 04/27/19 0338   WBC 14.85* 17.03*   HGB 8.7* 8.7*   HCT 27.5* 27.6*   * 161     Recent Labs   Lab 04/26/19 0330 04/27/19 0338   INR 0.9 0.9     Microbiology Results (last 7 days)     Procedure Component Value Units Date/Time    Blood culture [[id number]] Collected:  04/24/19 0950    Order Status:  Completed Specimen:  Blood from Peripheral, Wrist, Right Updated:  04/27/19 1412     Blood Culture, Routine No Growth to date     Blood Culture, Routine No Growth to date     Blood Culture, Routine No Growth to date     Blood Culture, Routine No Growth to date    Blood culture [[id number]] Collected:  04/23/19 0430    Order Status:  Completed Specimen:  Blood from Peripheral, Ankle, Right Updated:  04/27/19 0822     Blood Culture, Routine No Growth to date     Blood Culture, Routine No Growth to date     Blood Culture, Routine No Growth to date     Blood Culture, Routine No Growth to date     Blood Culture, Routine No Growth to date    Narrative:       Please collect with AM labs    Blood culture [[id number]] Collected:  04/23/19 0500    Order Status:  Completed Specimen:  Blood from Peripheral, Antecubital, Right Updated:  04/27/19 0822     Blood Culture, Routine No Growth to date     Blood Culture, Routine No Growth to date     Blood Culture, Routine No Growth to date     Blood Culture, Routine No Growth  to date     Blood Culture, Routine No Growth to date    Narrative:       Please collect with AM labs    Urine culture [[id number]] Collected:  04/25/19 0252    Order Status:  Completed Specimen:  Urine Updated:  04/26/19 1229     Urine Culture, Routine No growth    Narrative:       Preferred Collection Type->Urine, Clean Catch    Culture, Anaerobe [[id number]] Collected:  04/21/19 0125    Order Status:  Completed Specimen:  Body Fluid from Abdomen Updated:  04/25/19 1010     Anaerobic Culture No anaerobes isolated    Narrative:       Retroperitoneal fluid    Aerobic culture [[id number]]  (Susceptibility) Collected:  04/21/19 0125    Order Status:  Completed Specimen:  Body Fluid from Abdomen Updated:  04/24/19 1501     Aerobic Bacterial Culture --     STAPHYLOCOCCUS LUGDUNENSIS  Rare      Narrative:       Retroperitoneal fluid    Blood culture [[id number]] Collected:  04/21/19 1835    Order Status:  Completed Specimen:  Blood from Peripheral, Hand, Left Updated:  04/24/19 0902     Blood Culture, Routine Gram stain aer bottle: Gram negative rods     Blood Culture, Routine Results called to and read back by: Carmenza Wallace RN 04/22/2019  10:14     Blood Culture, Routine --     ESCHERICHIA COLI  For susceptibility see order #[id number]      Blood culture x two cultures. Draw prior to antibiotics. [[id number]] Collected:  04/20/19 1711    Order Status:  Completed Specimen:  Blood from Peripheral, Antecubital, Right Updated:  04/23/19 0910     Blood Culture, Routine Gram stain aer bottle: Gram negative rods     Blood Culture, Routine Gram stain flaco bottle: Gram negative rods      Blood Culture, Routine Results called to and read back by: Pancho Mars RN  04/21/2019  03:18     Blood Culture, Routine --     ESCHERICHIA COLI  For susceptibility see order #[id number]      Narrative:       Aerobic and anaerobic    Blood culture x two cultures. Draw prior to antibiotics. [[id number]]  (Susceptibility) Collected:  04/20/19 1705     Order Status:  Completed Specimen:  Blood from Peripheral, Antecubital, Right Updated:  04/23/19 0910     Blood Culture, Routine Gram stain aer bottle: Gram negative rods     Blood Culture, Routine Gram stain flaco bottle: Gram negative rods      Blood Culture, Routine Positive results previously called 04/21/2019  06:36     Blood Culture, Routine ESCHERICHIA COLI    Narrative:       Aerobic and anaerobic    Urine culture [[id number]] Collected:  04/21/19 2256    Order Status:  Completed Specimen:  Urine Updated:  04/23/19 0648     Urine Culture, Routine No significant growth    Narrative:       Preferred Collection Type->Urine, Clean Catch  YELLOW AND GREY RECEIVED    Urine culture [[id number]]  (Susceptibility) Collected:  04/20/19 1711    Order Status:  Completed Specimen:  Urine Updated:  04/22/19 1935     Urine Culture, Routine --     ESCHERICHIA COLI  >100,000 cfu/ml      Narrative:       Preferred Collection Type->Urine, Clean Catch    Urine culture [[id number]] Collected:  04/21/19 0640    Order Status:  Completed Specimen:  Kidney, left Updated:  04/22/19 1205     Urine Culture, Routine No growth    Narrative:       Urine sample from left nephostomy  WAS TOLD BY [employer] TO RE-ORDER AS URINE CULTURE 04/21/2019    06:52      Clostridium difficile EIA [[id number]] Collected:  04/22/19 0812    Order Status:  Completed Specimen:  Stool Updated:  04/22/19 1138     C. diff Antigen Negative     C difficile Toxins A+B, EIA Negative     Comment: Testing not recommended for children <24 months old.       Blood culture [[id number]]     Order Status:  Canceled Specimen:  Blood     Gram stain [[id number]] Collected:  04/20/19 1711    Order Status:  Completed Specimen:  Urine from Catheterized Updated:  04/21/19 1328     Gram Stain Result Rare WBC's      Few Gram negative rods    Narrative:       add on GRAM #[id number] per Dr. Higuera @  04/21/2019  11:07     Gram stain [[id number]]     Order Status:  Completed Specimen:   Urine, Catheterized     Culture, Body Fluid - Bactec [[id number]] Collected:  04/21/19 0641    Order Status:  Canceled Specimen:  Body Fluid from Bile Updated:  04/21/19 0643    Culture, Body Fluid - Bactec [[id number]] Collected:  04/21/19 0641    Order Status:  Canceled Specimen:  Body Fluid from Bile Updated:  04/21/19 0643    Culture, Anaerobe [[id number]] Collected:  04/21/19 0640    Order Status:  Canceled Specimen:  Body Fluid from Back Updated:  04/21/19 0642    Aerobic culture [[id number]] Collected:  04/21/19 0640    Order Status:  Canceled Specimen:  Kidney, left from Back Updated:  04/21/19 0642        CRP:   No results for input(s): CRP in the last 48 hours.  ESR: No results for input(s): POCESR, ERYTHROCYTES in the last 48 hours.    Significant Diagnostics:  I have reviewed all pertinent imaging results/findings within the past 24 hours.        Review of Systems        Assessment/Plan:     Sepsis  59F with recent L5-S1 OLIF with intraoperative ureteral injury, repaired by urology intraoperatively, with stent placement that presents with AMS, fevers, concern for sepsis. Urinalysis with innumerable WBCs and bacteria, now s/p L ureteral ligation and nephrostomy tube placement     Pt remains in critical condition in SICU. No focal deficit.    --Continue care per primary team.  --Continue nephrostomy management per urology.  --Neurosurgery will continue to follow peripherally for wound checks, will be immediately available for re-consult, please contact us with any questions or concerns.            Bishnu Wing MD  Neurosurgery  [facility]

## 2019-04-28 NOTE — PROGRESS NOTES
Dr Sagastume at bedside and asked to place pt on PAV+ 70% support 40% fio2 and peep of 5. No complications, will continue to monitor pt.

## 2019-04-28 NOTE — ASSESSMENT & PLAN NOTE
Mariann Huff is a 59 y.o. female s/p left ureteral injury on 4/12, who presented with fever, AMS, and intraabdominal abscess, taken for washout and ligation of left ureter with nephrostomy tube placement on 4/21. Difficulty weaning from vent, despite diuresis and weaning exercises. Possibly may require tracheostomy in the coming days.      Neuro:   - Sedated with precedex gtt.   - Pain control with fentanyl prn      Pulmonary:   - Intubated and sedated on precedex gtt.    - Daily CXR, still appears wet          - Lasix 40 IV given this am, may repeat later this afternoon  - ABGs prn   - Will attempt to wean today  - Cont SBTs      Cardiac:   - HDS at this time.  Pt has been weaned off pressors.   - TTE 4/22, EF 55%     Renal:    - S/p transection of left ureter 4/12 and subsequent ligation and PCT nephrostomy tube placement with IR 4/21  - Renal function improving. BUN 30 and Cr 0.8 today.    - UOP 3 L total  - Monitor I/Os    Intake/Output Summary (Last 24 hours) at 4/28/2019 0753  Last data filed at 4/28/2019 0700  Gross per 24 hour   Intake 2414.9 ml   Output 3490 ml   Net -1075.1 ml        ID:   - Blood cultures 4/12 +E. Coli; sensitivities pending. Repeat Bld Cx show NGTD.   - UCx from 4/20 growing E. Coli; sensitivities are now back. Repeat Cx pending.   - ID following for assistance with abx therapy, currently on Ceftriaxone and Rifampin. Will need long term antibiotic therapy and ID follow-up.   - White count down today, will continue to monitor     Hem/Onc:   - H/H stable at this time; cont to monitor     Endocrine:  - DM Type 2 at baseline    - Endocrine following for assistance with blood glucose control.      Fluids/Electrolytes/Nutrition/GI:   # Shock Liver          - Resolved           - Labs continue to show improvement          - Elevated LFTs now down trending; AST 47; ALT 84          - Thrombocytopenia; plts count improving; [id number]           - INR normalized     # Lactic Acidosis          -  Now resolved    # Diarrhea          - Pt with multiple episodes of loose watery stool resembling urine noted from rectal tube; 800 ml watery stool overnight          - C.Diff panel Negative          - MADHURI drain also with yellow fluid resembling urine.  Sample sent for Cr analysis, 1.7.      - Maintain TF @ 40 ml/hr  - Start free water flushes 300 cc q6h  - Replace electrolytes PRN        PPx:  - DVT: Lovenox        DISPO:  - Continue SICU care  - Continue to wean vent as able        Zoë Du MD CA-1

## 2019-04-28 NOTE — PLAN OF CARE
Problem: Adult Inpatient Plan of Care  Goal: Plan of Care Review  Outcome: Ongoing (interventions implemented as appropriate)  Pt resting comfortably, follows commands and moves all extremities spontaneously. Pt failed SBT trial today due to high RR, shallow breaths, and low tidal volumes. See respiratory note for details. Pt put back on AC VC+, Rate 16, 40%, 5 PEEP. SBP goal <180 maintained throughout shift. Precedex currently @ 0.8.  Pt denies pain or nausea. NSR-ST 70s. CVP 9, 8, 7. 40 40 Lasix given x2 today. See flowsheets for drain output. Nephrostomy tube clogged today with decreased output. Flushed per MD orders and is now unclogged and flowing. UO >2L this shift. Pt tolerating TFs at 40cc/hr (goal) with no residuals. 300cc free water boluses given per order. BMS in place with 200 cc diarrhea this shift. Pt turned q2h to prevent breakdown. Plan of care reviewed with pt and . Questions and concerns addressed. Will continue to monitor closely.    Problem: Diabetes Comorbidity  Goal: Blood Glucose Level Within Desired Range  Outcome: Ongoing (interventions implemented as appropriate)  .    Problem: Infection  Goal: Infection Symptom Resolution  Outcome: Ongoing (interventions implemented as appropriate)  .    Problem: Communication Impairment (Mechanical Ventilation, Invasive)  Goal: Effective Communication  Outcome: Ongoing (interventions implemented as appropriate)  .    Problem: Device-Related Complication Risk (Artificial Airway)  Goal: Optimal Device Function  Outcome: Ongoing (interventions implemented as appropriate)  .    Problem: Skin Injury Risk Increased  Goal: Skin Health and Integrity  Outcome: Ongoing (interventions implemented as appropriate)  .    Problem: Restraint, Nonbehavioral (Nonviolent)  Goal: Discontinuation Criteria Achieved  Outcome: Ongoing (interventions implemented as appropriate)  .    Problem: Spiritual Distress Risk or Actual  Goal: Spiritual Wellbeing  Outcome: Ongoing  (interventions implemented as appropriate)  .

## 2019-04-28 NOTE — SUBJECTIVE & OBJECTIVE
Interval History/Significant Events: NAEO. Diuresis of 3L yesterday. Failed SBT and placed back on PAV+. Later developed increased WOB with low TVs. Placed back on AC/VC overnight. Tmax of 100.6 resolved with Tylenol.     Follow-up For: Procedure(s) (LRB):  Creation, Nephrostomy, Percutaneous (Left)    Post-Operative Day: 7 Days Post-Op    Objective:     Vital Signs (Most Recent):  Temp: 99 °F (37.2 °C) (04/28/19 0700)  Pulse: 75 (04/28/19 0730)  Resp: (!) 25 (04/28/19 0314)  BP: (!) 169/78 (04/28/19 0730)  SpO2: 100 % (04/28/19 0730) Vital Signs (24h Range):  Temp:  [97.6 °F (36.4 °C)-100.6 °F (38.1 °C)] 99 °F (37.2 °C)  Pulse:  [] 75  Resp:  [25-32] 25  SpO2:  [94 %-100 %] 100 %  BP: (127-203)/(60-93) 169/78     Weight: 88.4 kg (194 lb 14.2 oz)  Body mass index is 33.45 kg/m².      Intake/Output Summary (Last 24 hours) at 4/28/2019 0747  Last data filed at 4/28/2019 0700  Gross per 24 hour   Intake 2414.9 ml   Output 3490 ml   Net -1075.1 ml       Physical Exam   Constitutional: She appears well-developed and well-nourished.   HENT:   Head: Normocephalic and atraumatic.   Intubated   Eyes: Right eye exhibits no discharge. Left eye exhibits no discharge.   Neck: Normal range of motion. Neck supple.   Cardiovascular: Normal rate and regular rhythm.   Pulmonary/Chest: Effort normal. No respiratory distress.   Intubated   Abdominal:   Midline incision c/d/i.  MADHURI drainage yellow.  Abdomen soft, non-distended. Rectal tube with watery output. OG tube in place.    Genitourinary:   Genitourinary Comments: Nephrostomy tube in place with watery dark yellow output.  Fontenot catheter+   Musculoskeletal: Normal range of motion.   Neurological:   Able to follow commands, denying pain. Lightly sedated on precedex gtt   Skin: Skin is warm and dry.   Vitals reviewed.      Vents:  Vent Mode: A/C (04/28/19 0717)  Set Rate: 16 bmp (04/28/19 0717)  Vt Set: 270 mL (04/28/19 0717)  Pressure Support: 20 cmH20 (04/24/19  1449)  PEEP/CPAP: 5 cmH20 (04/28/19 0717)  Oxygen Concentration (%): 39 (04/28/19 0730)  Peak Airway Pressure: 22 cmH2O (04/28/19 0717)  Plateau Pressure: 35 cmH20 (04/28/19 0717)  Total Ve: 6.39 mL (04/28/19 0717)  Negative Inspiratory Force (cm H2O): -18 (04/27/19 1306)  F/VT Ratio<105 (RSBI): (!) 88.65 (04/28/19 0314)    Lines/Drains/Airways     Central Venous Catheter Line                 Percutaneous Central Line Insertion/Assessment - triple lumen  04/21/19 0100 right internal jugular 7 days          Drain                 Closed/Suction Drain 04/21/19 0300 LLQ 7 days         NG/OG Tube 04/21/19 0728 Center mouth 7 days         Nephrostomy 04/21/19 0629 Left 8 Fr. 7 days         Urethral Catheter 04/20/19 1711 Latex 16 Fr. 7 days         Rectal Tube 04/21/19 2100 rectal tube w/ balloon (indicate number of mLs) 6 days          Airway                 Airway - Non-Surgical 04/21/19 0029 Endotracheal Tube 7 days                Significant Labs:    ABG:  None today    CBC/Anemia Profile:  Recent Labs   Lab 04/27/19  0338 04/28/19  0330   WBC 17.03* 15.89*   HGB 8.7* 8.6*   HCT 27.6* 28.4*    189   MCV 86 90   RDW 14.9* 15.1*        Chemistries:  Recent Labs   Lab 04/27/19  0338 04/28/19  0330   * 145   K 3.8 4.3   * 109   CO2 25 28   BUN 30* 30*   CREATININE 0.9 0.8   CALCIUM 9.2 9.1   ALBUMIN 1.4* 1.5*   PROT 5.8* 6.0   BILITOT 4.4* 3.4*   ALKPHOS 214* 209*   * 84*   AST 44* 47*   MG 1.7 1.8   PHOS 3.1  3.1 3.4       Significant Imaging:  I have reviewed all pertinent imaging results/findings within the past 24 hours.

## 2019-04-28 NOTE — SUBJECTIVE & OBJECTIVE
Interval History:   No acute events overnight  Diuresing, given 40mg lasix yesterday  Remains intubated, SBT this AM      Review of Systems    Objective:     Temp:  [97.6 °F (36.4 °C)-100.6 °F (38.1 °C)] 99 °F (37.2 °C)  Pulse:  [] 75  Resp:  [25-32] 25  SpO2:  [94 %-100 %] 100 %  BP: (127-203)/(60-93) 169/78     Body mass index is 33.45 kg/m².    Date 04/28/19 0700 - 04/29/19 0659   Shift 8534-4106 5229-1087 7121-1029 24 Hour Total   INTAKE   NG/GT 40   40   Shift Total(mL/kg) 40(0.5)   40(0.5)   OUTPUT   Urine(mL/kg/hr) 95   95   Shift Total(mL/kg) 95(1.1)   95(1.1)   Weight (kg) 88.4 88.4 88.4 88.4          Drains     Drain                 Closed/Suction Drain 04/21/19 0300 LLQ 6 days         NG/OG Tube 04/21/19 0728 Center mouth 6 days         Nephrostomy 04/21/19 0629 Left 8 Fr. 6 days         Urethral Catheter 04/20/19 1711 Latex 16 Fr. 6 days         Rectal Tube 04/21/19 2100 rectal tube w/ balloon (indicate number of mLs) 5 days                Physical Exam   Constitutional: She appears well-developed and well-nourished. No distress. She is intubated.   HENT:   Head: Normocephalic and atraumatic.   Neck: No JVD present.   Cardiovascular: Normal rate and regular rhythm.    Pulmonary/Chest: She is intubated.   mechanically ventilated   Abdominal: Soft. She exhibits no distension. There is no rebound and no guarding.   Dressing c/d/i  MADHURI drain with serous output   Genitourinary:   Genitourinary Comments: Fontenot draining clear yellow  Left neph tube with clear yellow urine   Neurological: She is alert.   Skin: Skin is warm and dry. She is not diaphoretic. No pallor.         Significant Labs:    BMP:  Recent Labs   Lab 04/26/19  0330 04/27/19  0338 04/28/19  0330   * 146* 145   K 3.5 3.8 4.3   * 111* 109   CO2 25 25 28   BUN 33* 30* 30*   CREATININE 1.1 0.9 0.8   CALCIUM 9.3 9.2 9.1       CBC:   Recent Labs   Lab 04/26/19  0330 04/27/19  0338 04/28/19  0330   WBC 14.85* 17.03* 15.89*   HGB 8.7*  8.7* 8.6*   HCT 27.5* 27.6* 28.4*   * 161 189       All pertinent labs results from the past 24 hours have been reviewed.    Significant Imaging:  All pertinent imaging results/findings from the past 24 hours have been reviewed.

## 2019-04-28 NOTE — CARE UPDATE
Patient was seen and evaluated at bedside. She had increased work of breathing and TV was about 240. She was returned to AC/VC plus, the settings she was on this morning prior to switching to PAV. Will monitor and consider re-evaluation in the am. However, there is possibility that she may end up requiring a trach should she not be able to be safely extubated.

## 2019-04-28 NOTE — NURSING
Decreased output from nephrostomy tube @1500 today. Appears to have more sediment than on previous assessments. Notified urology. Orders given to flush the nephrostomy tube. Able to flush and draw back however output did not increase and still appears to have more sediment than previously. Also called SICU team. Will continue to monitor.

## 2019-04-28 NOTE — PLAN OF CARE
Reviewed BG levels and insulin regimen.      Goal BG while inpatient: 140-180 mg/dl  Current BG levels are trend up above goal  Still intubated on TF    Recommendations:   - Continue Novolog 4U Q4hr   - Low dose correction scale  - POCT glucose Q4hr       We will follow with you, call with any questions    Charly Kim MD  Endocrine Fellow  4/28/2019

## 2019-04-28 NOTE — PROGRESS NOTES
Subjective/Objective  Interval History/Significant Events: NAEO. Diuresis of 3L yesterday. Failed SBT and placed back on PAV+. Later developed increased WOB with low TVs. Placed back on AC/VC overnight. Tmax of 100.6 resolved with Tylenol.     Follow-up For: Procedure(s) (LRB):  Creation, Nephrostomy, Percutaneous (Left)    Post-Operative Day: 7 Days Post-Op    Objective:     Vital Signs (Most Recent):  Temp: 99 °F (37.2 °C) (04/28/19 0700)  Pulse: 75 (04/28/19 0730)  Resp: (!) 25 (04/28/19 0314)  BP: (!) 169/78 (04/28/19 0730)  SpO2: 100 % (04/28/19 0730) Vital Signs (24h Range):  Temp:  [97.6 °F (36.4 °C)-100.6 °F (38.1 °C)] 99 °F (37.2 °C)  Pulse:  [] 75  Resp:  [25-32] 25  SpO2:  [94 %-100 %] 100 %  BP: (127-203)/(60-93) 169/78     Weight: 88.4 kg (194 lb 14.2 oz)  Body mass index is 33.45 kg/m².      Intake/Output Summary (Last 24 hours) at 4/28/2019 0747  Last data filed at 4/28/2019 0700  Gross per 24 hour   Intake 2414.9 ml   Output 3490 ml   Net -1075.1 ml       Physical Exam   Constitutional: She appears well-developed and well-nourished.   HENT:   Head: Normocephalic and atraumatic.   Intubated   Eyes: Right eye exhibits no discharge. Left eye exhibits no discharge.   Neck: Normal range of motion. Neck supple.   Cardiovascular: Normal rate and regular rhythm.   Pulmonary/Chest: Effort normal. No respiratory distress.   Intubated   Abdominal:   Midline incision c/d/i.  MADHURI drainage yellow.  Abdomen soft, non-distended. Rectal tube with watery output. OG tube in place.    Genitourinary:   Genitourinary Comments: Nephrostomy tube in place with watery dark yellow output.  Fontenot catheter+   Musculoskeletal: Normal range of motion.   Neurological:   Able to follow commands, denying pain. Lightly sedated on precedex gtt   Skin: Skin is warm and dry.   Vitals reviewed.      Vents:  Vent Mode: A/C (04/28/19 0717)  Set Rate: 16 bmp (04/28/19 0717)  Vt Set: 270 mL (04/28/19 0717)  Pressure Support: 20 cmH20  (04/24/19 1449)  PEEP/CPAP: 5 cmH20 (04/28/19 0717)  Oxygen Concentration (%): 39 (04/28/19 0730)  Peak Airway Pressure: 22 cmH2O (04/28/19 0717)  Plateau Pressure: 35 cmH20 (04/28/19 0717)  Total Ve: 6.39 mL (04/28/19 0717)  Negative Inspiratory Force (cm H2O): -18 (04/27/19 1306)  F/VT Ratio<105 (RSBI): (!) 88.65 (04/28/19 0314)    Lines/Drains/Airways     Central Venous Catheter Line                 Percutaneous Central Line Insertion/Assessment - triple lumen  04/21/19 0100 right internal jugular 7 days          Drain                 Closed/Suction Drain 04/21/19 0300 LLQ 7 days         NG/OG Tube 04/21/19 0728 Center mouth 7 days         Nephrostomy 04/21/19 0629 Left 8 Fr. 7 days         Urethral Catheter 04/20/19 1711 Latex 16 Fr. 7 days         Rectal Tube 04/21/19 2100 rectal tube w/ balloon (indicate number of mLs) 6 days          Airway                 Airway - Non-Surgical 04/21/19 0029 Endotracheal Tube 7 days                Significant Labs:    ABG:  None today    CBC/Anemia Profile:  Recent Labs   Lab 04/27/19  0338 04/28/19  0330   WBC 17.03* 15.89*   HGB 8.7* 8.6*   HCT 27.6* 28.4*    189   MCV 86 90   RDW 14.9* 15.1*        Chemistries:  Recent Labs   Lab 04/27/19  0338 04/28/19  0330   * 145   K 3.8 4.3   * 109   CO2 25 28   BUN 30* 30*   CREATININE 0.9 0.8   CALCIUM 9.2 9.1   ALBUMIN 1.4* 1.5*   PROT 5.8* 6.0   BILITOT 4.4* 3.4*   ALKPHOS 214* 209*   * 84*   AST 44* 47*   MG 1.7 1.8   PHOS 3.1  3.1 3.4       Significant Imaging:  I have reviewed all pertinent imaging results/findings within the past 24 hours.      Scheduled Meds:   cefTRIAXone (ROCEPHIN) IVPB  2 g Intravenous Q24H    chlorhexidine  15 mL Mouth/Throat BID    furosemide  40 mg Intravenous Once    heparin (porcine)  5,000 Units Subcutaneous Q8H    insulin aspart U-100  4 Units Subcutaneous Q24H    insulin aspart U-100  4 Units Subcutaneous Q24H    insulin aspart U-100  4 Units Subcutaneous Q24H     insulin aspart U-100  4 Units Subcutaneous Q24H    insulin aspart U-100  4 Units Subcutaneous Q24H    insulin aspart U-100  4 Units Subcutaneous Q24H    pantoprazole  40 mg Intravenous Daily    rifAMpin (RIFADIN) IVPB  300 mg Intravenous Q12H     Continuous Infusions:   dexmedetomidine (PRECEDEX) infusion 0.2 mcg/kg/hr (19 0900)     PRN Meds:acetaminophen, albuterol-ipratropium, calcium gluconate IVPB, calcium gluconate IVPB, calcium gluconate IVPB, dextrose 50%, [] fentaNYL **FOLLOWED BY** fentaNYL, glucagon (human recombinant), hydrALAZINE, insulin aspart U-100, magnesium sulfate IVPB, magnesium sulfate IVPB, nitroGLYCERIN, ondansetron, potassium chloride in water **AND** potassium chloride in water **AND** potassium chloride in water, promethazine (PHENERGAN) IVPB, sodium chloride 0.9%    Vital signs in last 24 hours:  Temp:  [97.6 °F (36.4 °C)-100.6 °F (38.1 °C)] 99 °F (37.2 °C)  Pulse:  [] 78  Resp:  [25-32] 25  SpO2:  [94 %-100 %] 99 %  BP: (127-182)/(60-83) 150/67    Intake/Output last 3 shifts:  I/O last 3 completed shifts:  In: 3544.9 [I.V.:984.9; NG/GT:2560]  Out: 5028 [Urine:4430; Drains:198; Stool:400]  Intake/Output this shift:  I/O this shift:  In: 80 [NG/GT:80]  Out: 212 [Urine:195; Drains:17]    Problem Assessment/Plan  Cardiac/Vascular  CAD (coronary artery disease)  Assessment & Plan  Mariann CROW Refugio is a 59 y.o. female s/p left ureteral injury on , who presented with fever, AMS, and intraabdominal abscess, taken for washout and ligation of left ureter with nephrostomy tube placement on . Difficulty weaning from vent, despite diuresis and weaning exercises. Possibly may require tracheostomy in the coming days.      Neuro:   - Sedated with precedex gtt.   - Pain control with fentanyl prn      Pulmonary:   - Intubated and sedated on precedex gtt.    - Daily CXR, still appears wet          - Lasix 40 IV given this am, may repeat later this afternoon  - ABGs prn   -  Will attempt to wean today  - Cont SBTs      Cardiac:   - HDS at this time.  Pt has been weaned off pressors.   - TTE 4/22, EF 55%     Renal:    - S/p transection of left ureter 4/12 and subsequent ligation and PCT nephrostomy tube placement with IR 4/21  - Renal function improving. BUN 30 and Cr 0.8 today.    - UOP 3 L total  - Monitor I/Os    Intake/Output Summary (Last 24 hours) at 4/28/2019 0753  Last data filed at 4/28/2019 0700  Gross per 24 hour   Intake 2414.9 ml   Output 3490 ml   Net -1075.1 ml        ID:   - Blood cultures 4/12 +E. Coli; sensitivities pending. Repeat Bld Cx show NGTD.   - UCx from 4/20 growing E. Coli; sensitivities are now back. Repeat Cx pending.   - ID following for assistance with abx therapy, currently on Ceftriaxone and Rifampin. Will need long term antibiotic therapy and ID follow-up.   - White count down today, will continue to monitor     Hem/Onc:   - H/H stable at this time; cont to monitor     Endocrine:  - DM Type 2 at baseline    - Endocrine following for assistance with blood glucose control.      Fluids/Electrolytes/Nutrition/GI:   # Shock Liver          - Resolved           - Labs continue to show improvement          - Elevated LFTs now down trending; AST 47; ALT 84          - Thrombocytopenia; plts count improving; [id number]           - INR normalized     # Lactic Acidosis          - Now resolved    # Diarrhea          - Pt with multiple episodes of loose watery stool resembling urine noted from rectal tube; 800 ml watery stool overnight          - C.Diff panel Negative          - MADHURI drain also with yellow fluid resembling urine.  Sample sent for Cr analysis, 1.7.      - Maintain TF @ 40 ml/hr  - Start free water flushes 300 cc q6h  - Replace electrolytes PRN        PPx:  - DVT: Lovenox        DISPO:  - Continue SICU care  - Continue to wean vent as able        Zoë Du MD CA-1

## 2019-04-29 LAB
ALBUMIN SERPL BCP-MCNC: 1.5 G/DL (ref 3.5–5.2)
ALP SERPL-CCNC: 196 U/L (ref 55–135)
ALT SERPL W/O P-5'-P-CCNC: 73 U/L (ref 10–44)
ANION GAP SERPL CALC-SCNC: 8 MMOL/L (ref 8–16)
AST SERPL-CCNC: 64 U/L (ref 10–40)
BACTERIA BLD CULT: NORMAL
BASOPHILS # BLD AUTO: 0.03 K/UL (ref 0–0.2)
BASOPHILS NFR BLD: 0.2 % (ref 0–1.9)
BILIRUB SERPL-MCNC: 2.5 MG/DL (ref 0.1–1)
BUN SERPL-MCNC: 31 MG/DL (ref 6–20)
CALCIUM SERPL-MCNC: 9.1 MG/DL (ref 8.7–10.5)
CHLORIDE SERPL-SCNC: 105 MMOL/L (ref 95–110)
CO2 SERPL-SCNC: 29 MMOL/L (ref 23–29)
CREAT SERPL-MCNC: 0.8 MG/DL (ref 0.5–1.4)
DIFFERENTIAL METHOD: ABNORMAL
EOSINOPHIL # BLD AUTO: 0.3 K/UL (ref 0–0.5)
EOSINOPHIL NFR BLD: 2.1 % (ref 0–8)
ERYTHROCYTE [DISTWIDTH] IN BLOOD BY AUTOMATED COUNT: 14.8 % (ref 11.5–14.5)
EST. GFR  (AFRICAN AMERICAN): >60 ML/MIN/1.73 M^2
EST. GFR  (NON AFRICAN AMERICAN): >60 ML/MIN/1.73 M^2
GLUCOSE SERPL-MCNC: 156 MG/DL (ref 70–110)
HCT VFR BLD AUTO: 27.4 % (ref 37–48.5)
HGB BLD-MCNC: 8.7 G/DL (ref 12–16)
IMM GRANULOCYTES # BLD AUTO: 0.3 K/UL (ref 0–0.04)
IMM GRANULOCYTES NFR BLD AUTO: 1.9 % (ref 0–0.5)
INR PPP: 0.9 (ref 0.8–1.2)
LYMPHOCYTES # BLD AUTO: 1.8 K/UL (ref 1–4.8)
LYMPHOCYTES NFR BLD: 11.4 % (ref 18–48)
MAGNESIUM SERPL-MCNC: 1.6 MG/DL (ref 1.6–2.6)
MCH RBC QN AUTO: 27.9 PG (ref 27–31)
MCHC RBC AUTO-ENTMCNC: 31.8 G/DL (ref 32–36)
MCV RBC AUTO: 88 FL (ref 82–98)
MONOCYTES # BLD AUTO: 0.8 K/UL (ref 0.3–1)
MONOCYTES NFR BLD: 5.1 % (ref 4–15)
NEUTROPHILS # BLD AUTO: 12.6 K/UL (ref 1.8–7.7)
NEUTROPHILS NFR BLD: 79.3 % (ref 38–73)
NRBC BLD-RTO: 0 /100 WBC
PHOSPHATE SERPL-MCNC: 2.9 MG/DL (ref 2.7–4.5)
PLATELET # BLD AUTO: 215 K/UL (ref 150–350)
PMV BLD AUTO: 12.7 FL (ref 9.2–12.9)
POCT GLUCOSE: 178 MG/DL (ref 70–110)
POCT GLUCOSE: 204 MG/DL (ref 70–110)
POCT GLUCOSE: 218 MG/DL (ref 70–110)
POCT GLUCOSE: 220 MG/DL (ref 70–110)
POCT GLUCOSE: 231 MG/DL (ref 70–110)
POTASSIUM SERPL-SCNC: 3.7 MMOL/L (ref 3.5–5.1)
PROT SERPL-MCNC: 6.2 G/DL (ref 6–8.4)
PROTHROMBIN TIME: 9.6 SEC (ref 9–12.5)
RBC # BLD AUTO: 3.12 M/UL (ref 4–5.4)
SODIUM SERPL-SCNC: 142 MMOL/L (ref 136–145)
WBC # BLD AUTO: 15.91 K/UL (ref 3.9–12.7)

## 2019-04-29 PROCEDURE — 99291 CRITICAL CARE FIRST HOUR: CPT | Mod: 24,GC,, | Performed by: SURGERY

## 2019-04-29 PROCEDURE — C9113 INJ PANTOPRAZOLE SODIUM, VIA: HCPCS | Performed by: ANESTHESIOLOGY

## 2019-04-29 PROCEDURE — 99499 NO LOS: ICD-10-PCS | Mod: ,,, | Performed by: PHYSICIAN ASSISTANT

## 2019-04-29 PROCEDURE — 63600175 PHARM REV CODE 636 W HCPCS: Performed by: UROLOGY

## 2019-04-29 PROCEDURE — 25000003 PHARM REV CODE 250: Performed by: STUDENT IN AN ORGANIZED HEALTH CARE EDUCATION/TRAINING PROGRAM

## 2019-04-29 PROCEDURE — 85025 COMPLETE CBC W/AUTO DIFF WBC: CPT

## 2019-04-29 PROCEDURE — 63600175 PHARM REV CODE 636 W HCPCS: Performed by: STUDENT IN AN ORGANIZED HEALTH CARE EDUCATION/TRAINING PROGRAM

## 2019-04-29 PROCEDURE — 63600175 PHARM REV CODE 636 W HCPCS: Performed by: ANESTHESIOLOGY

## 2019-04-29 PROCEDURE — 84100 ASSAY OF PHOSPHORUS: CPT

## 2019-04-29 PROCEDURE — 80053 COMPREHEN METABOLIC PANEL: CPT

## 2019-04-29 PROCEDURE — 63600175 PHARM REV CODE 636 W HCPCS: Performed by: PHYSICIAN ASSISTANT

## 2019-04-29 PROCEDURE — 99900035 HC TECH TIME PER 15 MIN (STAT)

## 2019-04-29 PROCEDURE — 99223 1ST HOSP IP/OBS HIGH 75: CPT | Mod: ,,, | Performed by: INTERNAL MEDICINE

## 2019-04-29 PROCEDURE — 25000003 PHARM REV CODE 250: Performed by: PHYSICIAN ASSISTANT

## 2019-04-29 PROCEDURE — 99499 UNLISTED E&M SERVICE: CPT | Mod: ,,, | Performed by: PHYSICIAN ASSISTANT

## 2019-04-29 PROCEDURE — 94003 VENT MGMT INPAT SUBQ DAY: CPT

## 2019-04-29 PROCEDURE — 94770 HC EXHALED C02 TEST: CPT

## 2019-04-29 PROCEDURE — 20000000 HC ICU ROOM

## 2019-04-29 PROCEDURE — 27200966 HC CLOSED SUCTION SYSTEM

## 2019-04-29 PROCEDURE — 27100171 HC OXYGEN HIGH FLOW UP TO 24 HOURS

## 2019-04-29 PROCEDURE — 27100092 HC HIGH FLOW DELIVERY CANNULA

## 2019-04-29 PROCEDURE — 83735 ASSAY OF MAGNESIUM: CPT

## 2019-04-29 PROCEDURE — 99900026 HC AIRWAY MAINTENANCE (STAT)

## 2019-04-29 PROCEDURE — 94761 N-INVAS EAR/PLS OXIMETRY MLT: CPT

## 2019-04-29 PROCEDURE — 99223 PR INITIAL HOSPITAL CARE,LEVL III: ICD-10-PCS | Mod: ,,, | Performed by: INTERNAL MEDICINE

## 2019-04-29 PROCEDURE — 27000221 HC OXYGEN, UP TO 24 HOURS

## 2019-04-29 PROCEDURE — 99291 PR CRITICAL CARE, E/M 30-74 MINUTES: ICD-10-PCS | Mod: 24,GC,, | Performed by: SURGERY

## 2019-04-29 PROCEDURE — 85610 PROTHROMBIN TIME: CPT

## 2019-04-29 RX ORDER — FUROSEMIDE 10 MG/ML
40 INJECTION INTRAMUSCULAR; INTRAVENOUS 2 TIMES DAILY
Status: DISCONTINUED | OUTPATIENT
Start: 2019-04-29 | End: 2019-05-03

## 2019-04-29 RX ORDER — GLYCOPYRROLATE 0.2 MG/ML
0.1 INJECTION INTRAMUSCULAR; INTRAVENOUS 2 TIMES DAILY PRN
Status: DISCONTINUED | OUTPATIENT
Start: 2019-04-29 | End: 2019-05-08

## 2019-04-29 RX ADMIN — INSULIN ASPART 4 UNITS: 100 INJECTION, SOLUTION INTRAVENOUS; SUBCUTANEOUS at 03:04

## 2019-04-29 RX ADMIN — HEPARIN SODIUM 5000 UNITS: 5000 INJECTION, SOLUTION INTRAVENOUS; SUBCUTANEOUS at 05:04

## 2019-04-29 RX ADMIN — INSULIN ASPART 1 UNITS: 100 INJECTION, SOLUTION INTRAVENOUS; SUBCUTANEOUS at 08:04

## 2019-04-29 RX ADMIN — INSULIN ASPART 1 UNITS: 100 INJECTION, SOLUTION INTRAVENOUS; SUBCUTANEOUS at 11:04

## 2019-04-29 RX ADMIN — MAGNESIUM SULFATE HEPTAHYDRATE 2 G: 40 INJECTION, SOLUTION INTRAVENOUS at 05:04

## 2019-04-29 RX ADMIN — INSULIN ASPART 4 UNITS: 100 INJECTION, SOLUTION INTRAVENOUS; SUBCUTANEOUS at 11:04

## 2019-04-29 RX ADMIN — ACETAMINOPHEN 650 MG: 325 TABLET ORAL at 06:04

## 2019-04-29 RX ADMIN — DEXMEDETOMIDINE HYDROCHLORIDE 1 MCG/KG/HR: 400 INJECTION INTRAVENOUS at 02:04

## 2019-04-29 RX ADMIN — FENTANYL CITRATE 50 MCG: 50 INJECTION INTRAMUSCULAR; INTRAVENOUS at 08:04

## 2019-04-29 RX ADMIN — RIFAMPIN 300 MG: 600 INJECTION, POWDER, LYOPHILIZED, FOR SOLUTION INTRAVENOUS at 03:04

## 2019-04-29 RX ADMIN — INSULIN ASPART 4 UNITS: 100 INJECTION, SOLUTION INTRAVENOUS; SUBCUTANEOUS at 08:04

## 2019-04-29 RX ADMIN — CHLORHEXIDINE GLUCONATE 0.12% ORAL RINSE 15 ML: 1.2 LIQUID ORAL at 08:04

## 2019-04-29 RX ADMIN — CEFTRIAXONE 2 G: 2 INJECTION, SOLUTION INTRAVENOUS at 03:04

## 2019-04-29 RX ADMIN — POTASSIUM CHLORIDE 40 MEQ: 400 INJECTION, SOLUTION INTRAVENOUS at 05:04

## 2019-04-29 RX ADMIN — HEPARIN SODIUM 5000 UNITS: 5000 INJECTION, SOLUTION INTRAVENOUS; SUBCUTANEOUS at 01:04

## 2019-04-29 RX ADMIN — INSULIN ASPART 4 UNITS: 100 INJECTION, SOLUTION INTRAVENOUS; SUBCUTANEOUS at 04:04

## 2019-04-29 RX ADMIN — FUROSEMIDE 40 MG: 10 INJECTION, SOLUTION INTRAMUSCULAR; INTRAVENOUS at 10:04

## 2019-04-29 RX ADMIN — DEXMEDETOMIDINE HYDROCHLORIDE 0.8 MCG/KG/HR: 400 INJECTION INTRAVENOUS at 12:04

## 2019-04-29 RX ADMIN — INSULIN ASPART 2 UNITS: 100 INJECTION, SOLUTION INTRAVENOUS; SUBCUTANEOUS at 11:04

## 2019-04-29 RX ADMIN — DEXMEDETOMIDINE HYDROCHLORIDE 1 MCG/KG/HR: 400 INJECTION INTRAVENOUS at 08:04

## 2019-04-29 RX ADMIN — FUROSEMIDE 40 MG: 10 INJECTION, SOLUTION INTRAMUSCULAR; INTRAVENOUS at 05:04

## 2019-04-29 RX ADMIN — HEPARIN SODIUM 5000 UNITS: 5000 INJECTION, SOLUTION INTRAVENOUS; SUBCUTANEOUS at 09:04

## 2019-04-29 RX ADMIN — HYDRALAZINE HYDROCHLORIDE 10 MG: 20 INJECTION INTRAMUSCULAR; INTRAVENOUS at 01:04

## 2019-04-29 RX ADMIN — RIFAMPIN 300 MG: 600 INJECTION, POWDER, LYOPHILIZED, FOR SOLUTION INTRAVENOUS at 01:04

## 2019-04-29 RX ADMIN — PANTOPRAZOLE SODIUM 40 MG: 40 INJECTION, POWDER, LYOPHILIZED, FOR SOLUTION INTRAVENOUS at 08:04

## 2019-04-29 RX ADMIN — INSULIN ASPART 4 UNITS: 100 INJECTION, SOLUTION INTRAVENOUS; SUBCUTANEOUS at 12:04

## 2019-04-29 RX ADMIN — ACETAMINOPHEN 650 MG: 325 TABLET ORAL at 08:04

## 2019-04-29 NOTE — PROGRESS NOTES
[facility]  Urology  Progress Note    Patient Name: Mariann Huff  MRN: [id number]  Admission Date: 4/20/2019  Hospital Length of Stay: 9 days  Code Status: Full Code   Attending Provider: Paul Carlson MD   Primary Care Physician: Jasbir Haney MD    Subjective:     HPI:  Mariann Huff is a 59 y.o. female with history of HTN, type 2 diabetes mellitus, CAD, NSTEMI, and back pain who [place] [facility] [place] status and sepsis. She underwent L5-S1 OLIF with NSGY on 4/12 and had intraoperative left ureteral injury with ureteroureteral anastomosis and ureteral stent placement. She did well initially and had correa and MADHURI drain removed on 4/16. She began having chills and altered mental status 2 days ago and this has progressively worsened. No complaints of pain.     She is altered and HPI is limited. In the ED she is febrile to 103 and tachycardic and pressures are low normal. WBC is 5, creatinine is 3.6 baseline 1.0, lactate is 4.6. Cath UA concerning for infection, 3+ LE, >100 WBCs, and many bacteria on microscopy.    CT and MRI abdomen both show air with minimal fluid near the surgical site with left ureter coursing through. There is air throughout the proximal collecting system which is decompressed with JJ ureteral stent in good position.    Taken for ex lap, ligation of left ureter and left neph tube placement on 4/21/19.    Interval History:   No acute events overnight  Diuresing, given 40mg lasix yesterday  Remains intubated, SBT this AM      Review of Systems    Objective:     Temp:  [97.6 °F (36.4 °C)-100.6 °F (38.1 °C)] 99 °F (37.2 °C)  Pulse:  [] 75  Resp:  [25-32] 25  SpO2:  [94 %-100 %] 100 %  BP: (127-203)/(60-93) 169/78     Body mass index is 33.45 kg/m².    Date 04/28/19 0700 - 04/29/19 0659   Shift 0316-8982 7653-0544 7238-6954 24 Hour Total   INTAKE   NG/GT 40   40   Shift Total(mL/kg) 40(0.5)   40(0.5)   OUTPUT   Urine(mL/kg/hr) 95   95   Shift Total(mL/kg)  95(1.1)   95(1.1)   Weight (kg) 88.4 88.4 88.4 88.4          Drains     Drain                 Closed/Suction Drain 04/21/19 0300 LLQ 6 days         NG/OG Tube 04/21/19 0728 Center mouth 6 days         Nephrostomy 04/21/19 0629 Left 8 Fr. 6 days         Urethral Catheter 04/20/19 1711 Latex 16 Fr. 6 days         Rectal Tube 04/21/19 2100 rectal tube w/ balloon (indicate number of mLs) 5 days                Physical Exam   Constitutional: She appears well-developed and well-nourished. No distress. She is intubated.   HENT:   Head: Normocephalic and atraumatic.   Neck: No JVD present.   Cardiovascular: Normal rate and regular rhythm.    Pulmonary/Chest: She is intubated.   mechanically ventilated   Abdominal: Soft. She exhibits no distension. There is no rebound and no guarding.   Dressing c/d/i  MADHURI drain with serous output   Genitourinary:   Genitourinary Comments: Fontenot draining clear yellow  Left neph tube with clear yellow urine   Neurological: She is alert.   Skin: Skin is warm and dry. She is not diaphoretic. No pallor.         Significant Labs:    BMP:  Recent Labs   Lab 04/26/19  0330 04/27/19  0338 04/28/19  0330   * 146* 145   K 3.5 3.8 4.3   * 111* 109   CO2 25 25 28   BUN 33* 30* 30*   CREATININE 1.1 0.9 0.8   CALCIUM 9.3 9.2 9.1       CBC:   Recent Labs   Lab 04/26/19  0330 04/27/19  0338 04/28/19  0330   WBC 14.85* 17.03* 15.89*   HGB 8.7* 8.7* 8.6*   HCT 27.5* 27.6* 28.4*   * 161 189       All pertinent labs results from the past 24 hours have been reviewed.    Significant Imaging:  All pertinent imaging results/findings from the past 24 hours have been reviewed.        Assessment/Plan:     * Ureteral transection of left native kidney  Mariann TRISTIN Huff is a 59 y.o. female s/p left ureteral injury on 4/12, presented with fever, AMS, and intraabdominal abscess, taken for washout and ligation of left ureter with neph tube placement on 4/21    - Management per SICU  - Vent per SICU, SBT  again today and wean to extubate  - Original urine cultures with E. Coli, wound culture growing staph lugdeensis on Rocephin per ID recs  - Repeat blood and urine cultures no growth  - Maintain correa and neph tube at this time  - maintain MADHURI drain    Sepsis  As above        VTE Risk Mitigation (From admission, onward)        Ordered     heparin (porcine) injection 5,000 Units  Every 8 hours      04/25/19 0730     Place sequential compression device  Until discontinued      04/20/19 2108     IP VTE HIGH RISK PATIENT  Once      04/20/19 2108          Hugh Higuera MD  Urology  [facility]

## 2019-04-29 NOTE — ASSESSMENT & PLAN NOTE
Mariann Huff is a 59 y.o. female s/p left ureteral injury on 4/12, presented with fever, AMS, and intraabdominal abscess, taken for washout and ligation of left ureter with neph tube placement on 4/21    - Management per SICU  - Vent per SICU, continue daily SBTs  - Original urine cultures with E. Coli, wound culture growing staph lugdeensis on Rocephin per ID recs. Rifampin for hardware. Febrile yesterday, ID re-consulted  - Maintain correa and neph tube at this time  - maintain MADHURI drain

## 2019-04-29 NOTE — CONSULTS
[facility]  Infectious Disease  Consult Note    Patient Name: Mariann Huff  MRN: [id number]  Admission Date: 4/20/2019  Hospital Length of Stay: 9 days  Attending Physician: Paul Carlson MD  Primary Care Provider: Jasbir Haney MD         Inpatient consult to Infectious Diseases  Consult performed by: POLLY Vera Jr.  Consult ordered by: Vivi Claudio MD      Consult received.  Full consult to follow.    POLLY Jerome  Infectious Disease  [facility]

## 2019-04-29 NOTE — SUBJECTIVE & OBJECTIVE
Interval History/Significant Events: Febrile to 101.1 overnight, came down to 99.9 with Tylenol. ID contacted, recommended continuing current regimen and to hold off on re-culturing. Remained on AC/VC overnight. Diuresis of 2.3L overnight.     Follow-up For: Procedure(s) (LRB):  Creation, Nephrostomy, Percutaneous (Left)    Post-Operative Day: 8 Days Post-Op    Objective:     Vital Signs (Most Recent):  Temp: (!) 100.4 °F (38 °C) (04/29/19 0625)  Pulse: 79 (04/29/19 0615)  Resp: 16 (04/29/19 0335)  BP: (!) 147/65 (04/29/19 0600)  SpO2: 97 % (04/29/19 0615) Vital Signs (24h Range):  Temp:  [99.5 °F (37.5 °C)-101.1 °F (38.4 °C)] 100.4 °F (38 °C)  Pulse:  [70-80] 79  Resp:  [16-18] 16  SpO2:  [95 %-100 %] 97 %  BP: (119-199)/(56-90) 147/65     Weight: 84.8 kg (186 lb 15.2 oz)  Body mass index is 32.09 kg/m².      Intake/Output Summary (Last 24 hours) at 4/29/2019 0705  Last data filed at 4/29/2019 0600  Gross per 24 hour   Intake 2501.8 ml   Output 3751 ml   Net -1249.2 ml       Physical Exam   Constitutional: She appears well-developed and well-nourished.   HENT:   Head: Normocephalic and atraumatic.   Intubated   Eyes: Right eye exhibits no discharge. Left eye exhibits no discharge.   Neck: Normal range of motion. Neck supple.   Cardiovascular: Normal rate and regular rhythm.   Pulmonary/Chest: Effort normal. No respiratory distress.   Intubated, secretions suctioned.    Abdominal:   Midline incision c/d/i.  MADHURI with scant serous drainage.  Abdomen soft, non-distended. Rectal tube with watery brown output.     Genitourinary:   Genitourinary Comments: Nephrostomy tube in place with watery dark yellow output.  Fontenot catheter+   Musculoskeletal: Normal range of motion.   Neurological:   Able to follow commands, denying pain. Lightly sedated on precedex gtt   Skin: Skin is warm and dry.   Vitals reviewed.      Vents:  Vent Mode: A/C (04/29/19 0505)  Set Rate: 16 bmp (04/29/19 0505)  Vt Set: 400 mL (04/29/19  0505)  Pressure Support: 20 cmH20 (04/24/19 1449)  PEEP/CPAP: 5 cmH20 (04/29/19 0505)  Oxygen Concentration (%): 40 (04/29/19 0615)  Peak Airway Pressure: 24 cmH2O (04/29/19 0505)  Plateau Pressure: 35 cmH20 (04/29/19 0505)  Total Ve: 6.11 mL (04/29/19 0505)  Negative Inspiratory Force (cm H2O): -18 (04/27/19 1306)  F/VT Ratio<105 (RSBI): (!) 41.78 (04/29/19 0335)    Lines/Drains/Airways     Central Venous Catheter Line                 Percutaneous Central Line Insertion/Assessment - triple lumen  04/21/19 0100 right internal jugular 8 days          Drain                 Closed/Suction Drain 04/21/19 0300 LLQ 8 days         Nephrostomy 04/21/19 0629 Left 8 Fr. 8 days         Urethral Catheter 04/20/19 1711 Latex 16 Fr. 8 days         NG/OG Tube 04/21/19 0728 Center mouth 7 days         Rectal Tube 04/21/19 2100 rectal tube w/ balloon (indicate number of mLs) 7 days          Airway                 Airway - Non-Surgical 04/21/19 0029 Endotracheal Tube 8 days                Significant Labs:    ABG:  None today    CBC/Anemia Profile:  Recent Labs   Lab 04/28/19  0330 04/29/19  0300   WBC 15.89* 15.91*   HGB 8.6* 8.7*   HCT 28.4* 27.4*    215   MCV 90 88   RDW 15.1* 14.8*        Chemistries:  Recent Labs   Lab 04/28/19  0330 04/29/19  0300    142   K 4.3 3.7    105   CO2 28 29   BUN 30* 31*   CREATININE 0.8 0.8   CALCIUM 9.1 9.1   ALBUMIN 1.5* 1.5*   PROT 6.0 6.2   BILITOT 3.4* 2.5*   ALKPHOS 209* 196*   ALT 84* 73*   AST 47* 64*   MG 1.8 1.6   PHOS 3.4 2.9       Significant Imaging:  I have reviewed all pertinent imaging results/findings within the past 24 hours.

## 2019-04-29 NOTE — ASSESSMENT & PLAN NOTE
60 y/o female with HTN, DMII, CAD, NSTEMI, s/p L5-S1 OLIF with NSGY 4/12 c/b intraoperative left ureteral injury and underwent ureteroureteral anastomoses and ureteral stent placement. She was discharged with a correa and MADHURI drain that was removed on 4/16. She was seen by urology and anticipated stent removal in 2-3 months (6/2019).  She presents to ED with AMS and E.coli septicemia found to have air and fluid collection of left anterior prevertebral soft tissue with left ureter involvement. She underwent ex-lap and washout on 4/21. We are consulted for abx recommendations.      Per op note,  There were pocket of purulence noted and evacuated, sent for culture of left retroperitoneum to pole of left kidney. The stent was visible. Posterior to the stent there was a pocket of purulent fluid and exposed hardware along the spinal vertebrae. The tissue along the distal and proximal end of ureter were friable and unhealthy. She underwent left nephrostomy tube placement. The stent was removed and several metal clips were placed on proximal end of the ureteral.     Patient has developed leukocytosis.  OR cultures with Staph Lugdenensis - not currently covered.  Stable and non septic - no obvious source.  Patient does appear to be having diarrhea - C diff negative but suspect may need restesting if WBC increases and no other source found.  QTc long at 480 - considered change to Cipro considered but will add vanc 1st to see if respond to covering staph lugdenensis.  CBC shows no eosinophilia to suggest drug reaction and no rash.    Recommendations:  1. Continue ceftriaxone 1mt37ys and rifampin 300 mg q12hr IV (transition to oral once able) given hardware in place.   2. Add vancomycin 15mg/kg IV q12 with trough goal 15-24  3. Rec line changes jonathan cvc  4. If no improvement will rec CT ABD/Pelvis and repeat C diff as appears to have loose stool  5. Anticipate 6 weeks of IV abx from first negative blood cultures (4/23-6/4/19)   followed by oral abx suppression given retained hardware.   6. Seen with ID staff

## 2019-04-29 NOTE — ASSESSMENT & PLAN NOTE
Mariann Huff is a 59 y.o. female s/p left ureteral injury on 4/12, who presented with fever, AMS, and intraabdominal abscess, taken for washout and ligation of left ureter with nephrostomy tube placement on 4/21. Difficulty weaning from vent, despite diuresis and weaning exercises. Possibly may require tracheostomy in the coming days.      Neuro:   - Sedated with precedex gtt.   - Pain control with fentanyl prn      Pulmonary:   - Intubated and sedated on precedex gtt.    - Daily CXR, improved from yesterday          - Lasix 40 IV BID  - ABGs prn   - Will attempt to wean today  - Robinol for high secretions  - Cont SBTs      Cardiac:   - HDS at this time.  Pt has been weaned off pressors.   - TTE 4/22, EF 55%     Renal:    - S/p transection of left ureter 4/12 and subsequent ligation and PCT nephrostomy tube placement with IR 4/21  - Renal function improving. BUN 30 and Cr 0.8 today.    - UOP 3 L total  - Monitor I/Os    Intake/Output Summary (Last 24 hours) at 4/29/2019 0708  Last data filed at 4/29/2019 0600  Gross per 24 hour   Intake 2501.8 ml   Output 3751 ml   Net -1249.2 ml        ID:   - Blood cultures 4/12 +E. Coli; sensitivities pending. Repeat Bld Cx show NGTD.   - UCx from 4/20 growing E. Coli; sensitivities are now back. Repeat Cx pending.   - ID following for assistance with abx therapy, recommend continuing current regimen  - Febrile overnight, improves marginally with Tylenol. White count stable at 15.   - If fever is over 101F, will get BCx, UCx, MiniBAL by RT  - Low suspicion for DVT/PE     Hem/Onc:   - H/H stable at this time; cont to monitor     Endocrine:  - DM Type 2 at baseline    - Endocrine following for assistance with blood glucose control.      Fluids/Electrolytes/Nutrition/GI:   # Shock Liver          - Resolved           - Labs continue to show improvement          - Elevated LFTs now down trending; AST 47; ALT 84          - Thrombocytopenia; plts count improving; [id number]            - INR normalized     # Lactic Acidosis          - Now resolved    # Diarrhea          - Pt with multiple episodes of loose watery stool resembling urine noted from rectal tube; 400 ml watery stool overnight          - C.Diff panel Negative          - MADHURI drain also with yellow fluid resembling urine.  Sample sent for Cr analysis, 1.7.      - Maintain TF @ 40 ml/hr  - Start free water flushes 300 cc q6h  - Replace electrolytes PRN        PPx:  - DVT: Lovenox        DISPO:  - Continue SICU care        Zoë Du MD CA-1

## 2019-04-29 NOTE — SUBJECTIVE & OBJECTIVE
Interval History:   Febrile to 101.1 overnight. ID re-consulted per SICU. Remains ventilated. UOP excellent. Drain minimal output.    Review of Systems    Objective:     Temp:  [99 °F (37.2 °C)-101.1 °F (38.4 °C)] 100.4 °F (38 °C)  Pulse:  [70-80] 79  Resp:  [16-18] 16  SpO2:  [95 %-100 %] 97 %  BP: (119-199)/(56-90) 147/65     Body mass index is 32.09 kg/m².           Drains     Drain                 Closed/Suction Drain 04/21/19 0300 LLQ 6 days         NG/OG Tube 04/21/19 0728 Center mouth 6 days         Nephrostomy 04/21/19 0629 Left 8 Fr. 6 days         Urethral Catheter 04/20/19 1711 Latex 16 Fr. 6 days         Rectal Tube 04/21/19 2100 rectal tube w/ balloon (indicate number of mLs) 5 days                Physical Exam   Constitutional: She appears well-developed and well-nourished. No distress. She is intubated.   HENT:   Head: Normocephalic and atraumatic.   Neck: No JVD present.   Cardiovascular: Normal rate and regular rhythm.    Pulmonary/Chest: She is intubated.   mechanically ventilated   Abdominal: Soft. She exhibits no distension. There is no rebound and no guarding.   Dressing c/d/i  MADHURI drain with serous output   Genitourinary:   Genitourinary Comments: Fontenot draining clear yellow  Left neph tube with clear yellow urine   Neurological: She is alert.   Skin: Skin is warm and dry. She is not diaphoretic. No pallor.       Significant Labs:    BMP:  Recent Labs   Lab 04/27/19  0338 04/28/19  0330 04/29/19  0300   * 145 142   K 3.8 4.3 3.7   * 109 105   CO2 25 28 29   BUN 30* 30* 31*   CREATININE 0.9 0.8 0.8   CALCIUM 9.2 9.1 9.1       CBC:   Recent Labs   Lab 04/27/19 0338 04/28/19  0330 04/29/19  0300   WBC 17.03* 15.89* 15.91*   HGB 8.7* 8.6* 8.7*   HCT 27.6* 28.4* 27.4*    189 215       All pertinent labs results from the past 24 hours have been reviewed.    Significant Imaging:  All pertinent imaging results/findings from the past 24 hours have been reviewed.

## 2019-04-29 NOTE — SUBJECTIVE & OBJECTIVE
Past Medical History:   Diagnosis Date    Anticoagulant long-term use     Asthma     Back pain     CAD (coronary artery disease)     s/p stentimg  (2), (1)    Carotid artery stenosis     Diabetes mellitus type 2 in obese     HTN (hypertension), benign     Hyperlipidemia     Keloid cicatrix     NPDR (nonproliferative diabetic retinopathy) 2015    NSTEMI (non-ST elevated myocardial infarction)     Nuclear sclerosis - Right Eye 3/18/2014    Primary localized osteoarthrosis, lower leg 2014    Sleep apnea        Past Surgical History:   Procedure Laterality Date    CARDIAC CATHETERIZATION      cataract extraction left eye      cataracts      CATHETERIZATION, HEART, LEFT Left 2014    Performed by Wilman Kim MD at [facility] CATH LAB     SECTION, LOW TRANSVERSE      CORONARY ANGIOPLASTY      Creation, Nephrostomy, Percutaneous Left 2019    Performed by Karina Surgeon at [facility] KAIRNA    ESOPHAGOGASTRODUODENOSCOPY (EGD) N/A 2016    Performed by Gardenia Adamson MD at [facility] ENDO (4TH FLR)    EXCISION TURBINATE, SUBMUCOUS      FUSION, SPINE, LUMBAR, ANTERIOR APPROACH Left L5-S1 Anterior to Psoa Interbody Fusion, L5-S1 Posterior Instrumentation Left 2019    Performed by Mk George MD at [facility] OR 2ND FLR    HAND SURGERY Left     HAND SURGERY Right     torn ligament repair/ Dr. Yeboah    HYSTERECTOMY      Injection,steroid,epidural,transforaminal approach - Bilateral - S1 Bilateral 2018    Performed by Tl Abreu MD at [facility] PAIN MGT    Injection-steroid-epidural-caudal N/A 2019    Performed by Dave Bentley Jr., MD at [facility] PAIN MGT    INSERTION-INTRAOCULAR LENS (IOL) Right 2015    Performed by Good Domingo MD at [facility] OR 1ST FLR    LAPAROTOMY, EXPLORATORY; LIGATION OF LEFT URETER; DOUBLE J STENT REMOVAL LEFT URETER Left 2019    Performed by Paul Carlson MD at [facility] OR 2ND FLR    left foot surgery       "left wrist surgery      NASAL SEPTUM SURGERY  5/7/15    PHACOEMULSIFICATION-ASPIRATION-CATARACT Right 9/1/2015    Performed by Good Domingo MD at [facility] OR 1ST FLR    REPAIR, URETER  4/12/2019    Performed by Mk George MD at [facility] OR 2ND FLR    RESECTION-TURBINATES (SMR) N/A 5/7/2015    Performed by Dileep Dubois III, MD at [facility] OR 2ND FLR    rt elbow surgery      S/P LAD COATED STENT  05/14/2010    6 total     S/P OM1 STENT  08/2003    SEPTOPLASTY N/A 5/7/2015    Performed by Dileep Dubois III, MD at [facility] OR 2ND FLR    SINUS SURGERY      F.E.S.S.    SINUS SURGERY FUNCTIONAL ENDOSCOPIC WITH NAVIGATION WITH MAXILLARIES, ETHMOIDS, LEFT SPHENOID, LEFT LOLY N/A 5/7/2015    Performed by Dileep Dubois III, MD at [facility] OR 2ND FLR    STENT, URETERAL placement  4/12/2019    Performed by Robin Boyd MD at [facility] OR 2ND FLR    TUBAL LIGATION         Review of patient's allergies indicates:  No Known Allergies    Medications:  Medications Prior to Admission   Medication Sig    albuterol (PROVENTIL/VENTOLIN HFA) 90 mcg/actuation inhaler Inhale 2 puffs into the lungs every 6 (six) hours as needed for Wheezing.    amLODIPine (NORVASC) 10 MG tablet TAKE 1 TABLET (10 MG TOTAL) BY MOUTH ONCE DAILY.    aspirin 81 mg Tab Take 81 mg by mouth. 1 Tablet Oral Every day    atorvastatin (LIPITOR) 40 MG tablet TAKE 1 TABLET (40 MG TOTAL) BY MOUTH ONCE DAILY.    ACCU-CHEK EDIN PLUS METER Misc     BD INSULIN PEN NEEDLE UF MINI 31 x 3/16 " Ndle USE 4 TIMES A DAY    blood sugar diagnostic (ACCU-CHEK EDIN PLUS TEST STRP) Strp TEST BLOOD SUGARS 4 TIMES DAILY    chlorthalidone (HYGROTEN) 50 MG Tab Take 1 tablet (50 mg total) by mouth once daily.    cyclobenzaprine (FLEXERIL) 10 MG tablet TAKE 1 TABLET BY MOUTH 3 TIMES A DAY AS NEEDED FOR MUSCLE SPASMS. NO WORKING OR DRIVING ON THIS MED    esomeprazole (NEXIUM) 40 MG capsule TAKE 1 CAPSULE (40 MG TOTAL) BY MOUTH BEFORE BREAKFAST.    fenofibrate " "micronized (LOFIBRA) 134 MG Cap TAKE 1 CAPSULE (134 MG TOTAL) BY MOUTH BEFORE BREAKFAST.    flash glucose scanning reader (FREESTYLE MISTI 14 DAY READER) Misc 1 each by Misc.(Non-Drug; Combo Route) route once daily.    flash glucose sensor (FREESTYLE MISTI 14 DAY SENSOR) Kit 1 each by Misc.(Non-Drug; Combo Route) route once daily.    gabapentin (NEURONTIN) 100 MG capsule Take 2 capsules (200 mg total) by mouth every evening.    insulin aspart U-100 (NOVOLOG FLEXPEN U-100 INSULIN) 100 unit/mL InPn pen INJECT 35 U AM, 45 U AT NOON, 35 U PM.150-200 +2, 201-250 +4, 251-300 +6, 301-350 +8, >350 +10    insulin glargine, TOUJEO, (TOUJEO SOLOSTAR U-300 INSULIN) 300 unit/mL (1.5 mL) InPn pen Inject 50 Units into the skin 2 (two) times daily.    INVOKANA 300 mg Tab tablet Take 1 tablet (300 mg total) by mouth once daily.    irbesartan (AVAPRO) 300 MG tablet Take 1 tablet (300 mg total) by mouth every evening.    lancets 30 gauge Misc     metoprolol succinate (TOPROL-XL) 100 MG 24 hr tablet TAKE 1 TABLET (100 MG TOTAL) BY MOUTH ONCE DAILY.    nitroGLYCERIN (NITROSTAT) 0.4 MG SL tablet Take one every 2-3 min till chestpain relief for 3 times and if still no relief, call MD or come to ED    omega-3 acid ethyl esters (LOVAZA) 1 gram capsule Take 1 g by mouth once daily.     pen needle, diabetic (BD ULTRA-FINE GRABIEL PEN NEEDLES) 32 gauge x 5/32" Ndle For use with insulin pens daily 4 times a day    prasugrel (EFFIENT) 10 mg Tab TAKE 1 TABLET (10 MG TOTAL) BY MOUTH ONCE DAILY.    tamsulosin (FLOMAX) 0.4 mg Cap Take 1 capsule (0.4 mg total) by mouth every evening.    tramadol (ULTRAM) 50 mg tablet Take 1 tablet (50 mg total) by mouth 3 (three) times daily as needed for Pain.    TRULICITY 1.5 mg/0.5 mL PnIj INJECT 1.5 MG INTO THE SKIN EVERY 7 DAYS.    zolpidem (AMBIEN) 5 MG Tab Take 1 tablet (5 mg total) by mouth nightly as needed.     Antibiotics (From admission, onward)    Start     Stop Route Frequency Ordered    " 04/24/19 1430  rifAMpin (RIFADIN) 300 mg in sodium chloride 0.9% 100 mL IVPB      -- IV Every 12 hours (non-standard times) 04/24/19 1325    04/23/19 1600  cefTRIAXone (ROCEPHIN) 2 g in dextrose 5 % 50 mL IVPB      -- IV Every 24 hours (non-standard times) 04/23/19 1044        Antifungals (From admission, onward)    None        Antivirals (From admission, onward)    None           Immunization History   Administered Date(s) Administered    Influenza 11/19/2009    Influenza - Quadrivalent 10/10/2014, 10/09/2015, 10/24/2016    Influenza - Quadrivalent - PF 11/27/2017    Influenza A (H1N1) 2009 Monovalent - IM 11/19/2009    Pneumococcal Polysaccharide - 23 Valent 06/19/2014    Td - PF (ADULT) 04/17/2017       Family History     Problem Relation (Age of Onset)    Diabetes Mother, Father, Sister, Brother, Sister    Heart attack Father    Heart disease Mother    Leukemia Father    No Known Problems Sister, Brother, Brother, Maternal Grandmother, Maternal Grandfather, Paternal Grandmother, Paternal Grandfather, Son, Son, Maternal Aunt, Maternal Uncle, Paternal Aunt, Paternal Uncle        Social History     Socioeconomic History    Marital status:      Spouse name: Shamir    Number of children: 2    Years of education: Not on file    Highest education level: Not on file   Occupational History    Occupation: [employer]     Employer[place] [employer]     Employer: [employer]     Employer: [employer]   Social Needs    Financial resource strain: Not on file    Food insecurity:     Worry: Not on file     Inability: Not on file    Transportation needs:     Medical: Not on file     Non-medical: Not on file   Tobacco Use    Smoking status: Never Smoker    Smokeless tobacco: Never Used   Substance and Sexual Activity    Alcohol use: No     Alcohol/week: 0.0 oz    Drug use: No    Sexual activity: Yes     Partners: Male     Birth control/protection: Post-menopausal      Comment:    Lifestyle    Physical activity:     Days per week: Not on file     Minutes per session: Not on file    Stress: Not on file   Relationships    Social connections:     Talks on phone: Not on file     Gets together: Not on file     Attends Latter-day service: Not on file     Active member of club or organization: Not on file     Attends meetings of clubs or organizations: Not on file     Relationship status: Not on file   Other Topics Concern    Are you pregnant or think you may be? No    Breast-feeding No   Social History Narrative    . 2 children.      Review of Systems   Reason unable to perform ROS: limited as intubated but answering with head nod.   Constitutional: Negative for activity change, chills, diaphoresis and fever.   Respiratory: Negative for cough, shortness of breath and wheezing.    Cardiovascular: Negative for chest pain.   Gastrointestinal: Negative for abdominal pain, constipation, diarrhea, nausea and vomiting.   Genitourinary: Negative for dysuria, frequency and urgency.   Neurological: Negative for dizziness.   Hematological: Does not bruise/bleed easily.     Objective:     Vital Signs (Most Recent):  Temp: 99.5 °F (37.5 °C) (04/29/19 1730)  Pulse: 90 (04/29/19 1730)  Resp: 16 (04/29/19 0335)  BP: (!) 156/63 (04/29/19 1730)  SpO2: 98 % (04/29/19 1730) Vital Signs (24h Range):  Temp:  [99.1 °F (37.3 °C)-101.1 °F (38.4 °C)] 99.5 °F (37.5 °C)  Pulse:  [72-99] 90  Resp:  [16-18] 16  SpO2:  [95 %-100 %] 98 %  BP: (119-220)/() 156/63     Weight: 84.8 kg (186 lb 15.2 oz)  Body mass index is 32.09 kg/m².    Estimated Creatinine Clearance: 80.7 mL/min (based on SCr of 0.8 mg/dL).    Physical Exam   Constitutional: She is oriented to person, place, and time. She appears well-developed and well-nourished. No distress.       HENT:   Head: Normocephalic and atraumatic.   Cardiovascular: Normal rate, regular rhythm and normal heart sounds. Exam reveals no gallop and no  friction rub.   No murmur heard.  Pulmonary/Chest: Effort normal and breath sounds normal. No respiratory distress. She has no wheezes. She has no rales.   Abdominal: Soft. Bowel sounds are normal. She exhibits no distension and no mass. There is no tenderness. There is no rebound and no guarding.   Musculoskeletal: She exhibits no edema.   Neurological: She is alert and oriented to person, place, and time.   Skin: Skin is warm and dry. She is not diaphoretic.   Psychiatric: She has a normal mood and affect. Her behavior is normal.       Significant Labs:   Blood Culture:   Recent Labs   Lab 04/20/19  1711 04/21/19  1835 04/23/19  0430 04/23/19  0500 04/24/19  0950   LABBLOO Gram stain aer bottle: Gram negative rods  Gram stain flaco bottle: Gram negative rods   Results called to and read back by: Pancho Mars RN  04/21/2019  03:18  ESCHERICHIA COLI  For susceptibility see order #[id number]   Gram stain aer bottle: Gram negative rods  Results called to and read back by: Carmenza Wallace RN 04/22/2019  10:14  ESCHERICHIA COLI  For susceptibility see order #[id number]   No growth after 5 days. No growth after 5 days. No growth after 5 days.     CBC:   Recent Labs   Lab 04/28/19  0330 04/29/19  0300   WBC 15.89* 15.91*   HGB 8.6* 8.7*   HCT 28.4* 27.4*    215     CMP:   Recent Labs   Lab 04/28/19  0330 04/29/19  0300    142   K 4.3 3.7    105   CO2 28 29   * 156*   BUN 30* 31*   CREATININE 0.8 0.8   CALCIUM 9.1 9.1   PROT 6.0 6.2   ALBUMIN 1.5* 1.5*   BILITOT 3.4* 2.5*   ALKPHOS 209* 196*   AST 47* 64*   ALT 84* 73*   ANIONGAP 8 8   EGFRNONAA >60.0 >60.0     Urine Culture:   Recent Labs   Lab 04/20/19  1711 04/21/19  0640 04/21/19  2256 04/25/19  0252   LABURIN ESCHERICHIA COLI  >100,000 cfu/ml   No growth No significant growth No growth     Urine Studies:   Recent Labs   Lab 04/25/19  0252   COLORU Argelia   APPEARANCEUA Cloudy*   PHUR 5.0   SPECGRAV 1.020   PROTEINUA 1+*   GLUCUA 3+*    KETONESU 1+*   BILIRUBINUA Negative   OCCULTUA 3+*   NITRITE Negative   LEUKOCYTESUR 3+*   RBCUA 25*   WBCUA 55*   BACTERIA Few*   SQUAMEPITHEL 1   HYALINECASTS 0     Wound Culture:   Recent Labs   Lab 04/21/19  0125   LABAERO STAPHYLOCOCCUS LUGDUNENSIS  Rare       All pertinent labs within the past 24 hours have been reviewed.    Significant Imaging: I have reviewed all pertinent imaging results/findings within the past 24 hours.   X-Ray Chest 1 View [[id number]] Resulted: 04/29/19 0750   Order Status: Completed Updated: 04/29/19 0753   Narrative:     EXAMINATION:  XR CHEST 1 VIEW    CLINICAL HISTORY:  intubated;    FINDINGS:  Tubes and lines a good position.  Heart size is normal there is moderate edema and no change.   Impression:       See above    Pulmonary edema pneumonia aspiration or sepsis.      Electronically signed by: Mart Norris MD  Date: 04/29/2019  Time: 07:50   X-Ray Chest 1 View [[id number]] Resulted: 04/28/19 0932   Order Status: Completed Updated: 04/28/19 0935   Narrative:     EXAMINATION:  XR CHEST 1 VIEW    CLINICAL HISTORY:  intubated;.    TECHNIQUE:  Single frontal portable view of the chest was performed.    COMPARISON:  April 27, 2019 at 06:29    FINDINGS:  Support devices: ET tube, NG tube, and right internal jugular central venous catheter remain.    There has not been any detrimental change since April 27, 2019 at 06:29.   Impression:       No detrimental change.      Electronically signed by: Sarah Mora MD  Date: 04/28/2019  Time: 09:32   X-Ray Chest 1 View [[id number]] Resulted: 04/27/19 1350   Order Status: Completed Updated: 04/27/19 1352   Narrative:     EXAMINATION:  XR CHEST 1 VIEW    CLINICAL HISTORY:  intubated;.    TECHNIQUE:  Single frontal portable view of the chest was performed.    COMPARISON:  April 26, 2019 at 05:54    FINDINGS:  Support devices: ET tube, NG tube, and right internal jugular central venous catheter remain.    There has not been any detrimental  change since April 26, 2019 at 05:54.   Impression:       No detrimental change.      Electronically signed by: Sarah Mora MD  Date: 04/27/2019  Time: 13:50   X-Ray Chest 1 View [[id number]] Resulted: 04/26/19 0741   Order Status: Completed Updated: 04/26/19 0744   Narrative:     EXAMINATION:  XR CHEST 1 VIEW    CLINICAL HISTORY:  Intubated. Eval pneumonia.;    COMPARISON:  Comparison is made to 04/25/2019.    FINDINGS:  Endotracheal tube tip lies 1.5 cm above the level of the babr.  There has been partial interval clearing of airspace consolidation on both sides since 04/25/2019, with no significant detrimental interval change in the appearance of chest since that time observed.  No pneumothorax.   Impression:       As above      Electronically signed by: Christian Moreno MD  Date: 04/26/2019  Time: 07:41   X-Ray Chest 1 View [[id number]] Resulted: 04/25/19 0656   Order Status: Completed Updated: 04/25/19 0659   Narrative:     EXAMINATION:  XR CHEST 1 VIEW    CLINICAL HISTORY:  Intubated. Eval pneumonia.;    COMPARISON:  Comparison is made to 04/24/2019 and 04/21/2019.    FINDINGS:  Endotracheal tube tip lies 1.5 cm above the barb.  Distal aspect of the enteric tube lies distal to the GE junction, the tube tip projected inferior to the imaged volume.  Right jugular origin vascular catheter is in the superior vena cava.  Heart size and the appearance of the cardiomediastinal silhouette have not changed appreciably since the examinations referenced above.  Pulmonary parenchymal opacity is again noted bilaterally consistent with widespread airspace consolidation.  This appears more pronounced on the current examination than on 04/24/2019, with progressive worsening since 04/21/2019.  Some of the opacity in the inferior hemithoraces on each side may reflect associated pleural fluid.  No pneumothorax.   Impression:       Worsening bilateral airspace consolidation.      Electronically signed by: Christian Moreno MD  Date:  04/25/2019  Time: 06:56   X-Ray Chest 1 View [[id number]] Resulted: 04/24/19 1136   Order Status: Completed Updated: 04/24/19 1138   Narrative:     EXAMINATION:  XR CHEST 1 VIEW    CLINICAL HISTORY:  Intubated. Eval pneumonia.;    FINDINGS:  ET tip T5 central line mid SVC NG tip fundus.  There is cardiomegaly aortic plaque moderate-severe edema pleural fluid and slight worsening.   Impression:       See above    Pulmonary edema pneumonia aspiration or sepsis.      Electronically signed by: Mart Norris MD  Date: 04/24/2019  Time: 11:36   X-Ray Abdomen AP 1 View [[id number]] Resulted: 04/23/19 0752   Order Status: Completed Updated: 04/23/19 0755   Narrative:     EXAMINATION:  XR ABDOMEN AP 1 VIEW    CLINICAL HISTORY:  OG placement;    TECHNIQUE:  AP View(s) of the abdomen was performed.    COMPARISON:  April 12, 2019.    FINDINGS:  OG tube with its tip just within the stomach.  Tube could be advanced at least 5 cm.  Probable nephrostomy tube surgical drain on the left.  Postop changes in the spine.  There is some air in the left upper quadrant.   Impression:       OG tube just within the stomach.  Tube could be advanced at least 5 cm.  Postop changes above.      Electronically signed by: Mk Reilly MD  Date: 04/23/2019  Time: 07:52

## 2019-04-29 NOTE — PROGRESS NOTES
"[facility]  Adult Nutrition  Progress Note    SUMMARY       Recommendations  Recommendation/Intervention:   1. To better meet needs, recommend modifying TF to Impact Peptide 1.5 at a goal rate of 40 mL/hr - to provide 1440 kcal/day, 90g protein/day, and 739mL free fluid/day.   2. When able to extubate, ADAT to Diabetic with texture per SLP.   3. Per policy, RD to provide diabetic diet education to patients with A1c > 9.0. Patient with A1c 10.8 but intubated and not appropriate for education at this time. RD will follow-up.   RD to monitor.    Goals: Patient to receive nutrition by RD follow-up  Nutrition Goal Status: new  Communication of RD Recs: reviewed with RN    Reason for Assessment  Reason For Assessment: RD follow-up  Diagnosis: surgery/postoperative complications(ureteral transection of L kidney)  Relevant Medical History: CAD, DM2, HTN, HLD  General Information Comments: Still intubated. Tolerating TF at goal rate. (Physical exam remains unchanged, continues to appear nourished with no physical signs of malnutrition at this time.)  Nutrition Discharge Planning: Unable to determine at this time.    Nutrition Risk Screen  Nutrition Risk Screen: tube feeding or parenteral nutrition    Nutrition/Diet History  Factors Affecting Nutritional Intake: NPO, on mechanical ventilation    Anthropometrics  Temp: 99.7 °F (37.6 °C)  Height Method: Estimated  Height: 5' 4" (162.6 cm)  Height (inches): 64 in  Weight Method: Bed Scale  Weight: 84.8 kg (186 lb 15.2 oz)  Weight (lb): 186.95 lb  Ideal Body Weight (IBW), Female: 120 lb  % Ideal Body Weight, Female (lb): 155.79 lb  BMI (Calculated): 32.2  BMI Grade: 30 - 34.9- obesity - grade I    Lab/Procedures/Meds  Pertinent Labs Reviewed: reviewed  Pertinent Labs Comments: BUN 31, Glu 156, Alb 1.5  Pertinent Medications Reviewed: reviewed  Pertinent Medications Comments: lasix, insulin, pantoprazole, precedex    Estimated/Assessed Needs  Weight Used For " Calorie Calculations: 71.4 kg (157 lb 6.5 oz)(admit weight)  Energy Calorie Requirements (kcal): 1650 kcal/day  Energy Need Method: [facility]  Protein Requirements:  g/day(1.2-1.5 g/kg)  Weight Used For Protein Calculations: 72.6 kg (160 lb 0.9 oz)  Fluid Requirements (mL): 1 mL/kcal or per MD  Estimated Fluid Requirement Method: RDA Method  RDA Method (mL): 1650    Nutrition Prescription Ordered  Current Diet Order: NPO  Current Nutrition Support Formula Ordered: Peptamen Intense VHP  Current Nutrition Support Rate Ordered: 40 (ml)  Current Nutrition Support Frequency Ordered: mL/hr    Evaluation of Received Nutrient/Fluid Intake  Enteral Calories (kcal): 960  Enteral Protein (gm): 88  Enteral (Free Water) Fluid (mL): 806  % Kcal Needs: 58  % Protein Needs: 100  Other Fluid (mL): 1200(300mL q 6 hours)  Total Fluid Intake (mL): 2006  I/O: -1.4L x 24hrs, +6.9L since admit  Energy Calories Required: not meeting needs  Protein Required: meeting needs  Fluid Required: (per MD)  Comments: LBM 4/29  Tolerance: tolerating  % Intake of Estimated Energy Needs: 50 - 75 %  % Meal Intake: NPO    Nutrition Risk  Level of Risk/Frequency of Follow-up: low(1x/week)     Assessment and Plan  Nutrition Problem  Inadequate energy intake     Related to (etiology):   Decreased ability to consume sufficient energy     Signs and Symptoms (as evidenced by):   NPO, TF providing < 85% EEN and EPN     Interventions/Recommendations (treatment strategy):  Collaboration and referral of nutrition care     Nutrition Diagnosis Status:   Continues    Monitor and Evaluation  Food and Nutrient Intake: energy intake, enteral nutrition intake  Food and Nutrient Adminstration: enteral and parenteral nutrition administration  Anthropometric Measurements: weight, weight change  Biochemical Data, Medical Tests and Procedures: electrolyte and renal panel, gastrointestinal profile, glucose/endocrine profile, inflammatory profile  Nutrition-Focused  Physical Findings: overall appearance     Nutrition Follow-Up  RD Follow-up?: Yes

## 2019-04-29 NOTE — PROGRESS NOTES
[facility]  Urology  Progress Note    Patient Name: Mariann Huff  MRN: [id number]  Admission Date: 4/20/2019  Hospital Length of Stay: 9 days  Code Status: Full Code   Attending Provider: Paul Carlson MD   Primary Care Physician: Jasbir Haney MD    Subjective:     HPI:  Mariann Huff is a 59 y.o. female with history of HTN, type 2 diabetes mellitus, CAD, NSTEMI, and back pain [place] [facility] [place] and sepsis. She underwent L5-S1 OLIF with NSGY on 4/12 and had intraoperative left ureteral injury with ureteroureteral anastomosis and ureteral stent placement. She did well initially and had correa and MADHURI drain removed on 4/16. She began having chills and altered mental status 2 days ago and this has progressively worsened. No complaints of pain.     She is altered and HPI is limited. In the ED she is febrile to 103 and tachycardic and pressures are low normal. WBC is 5, creatinine is 3.6 baseline 1.0, lactate is 4.6. Cath UA concerning for infection, 3+ LE, >100 WBCs, and many bacteria on microscopy.    CT and MRI abdomen both show air with minimal fluid near the surgical site with left ureter coursing through. There is air throughout the proximal collecting system which is decompressed with JJ ureteral stent in good position.    Taken for ex lap, ligation of left ureter and left neph tube placement on 4/21/19.    Interval History:   Febrile to 101.1 overnight. ID re-consulted per SICU. Remains ventilated. UOP excellent. Drain minimal output.    Review of Systems    Objective:     Temp:  [99 °F (37.2 °C)-101.1 °F (38.4 °C)] 100.4 °F (38 °C)  Pulse:  [70-80] 79  Resp:  [16-18] 16  SpO2:  [95 %-100 %] 97 %  BP: (119-199)/(56-90) 147/65     Body mass index is 32.09 kg/m².           Drains     Drain                 Closed/Suction Drain 04/21/19 0300 LLQ 6 days         NG/OG Tube 04/21/19 0728 Center mouth 6 days         Nephrostomy 04/21/19 0629 Left 8 Fr. 6 days          Urethral Catheter 04/20/19 1711 Latex 16 Fr. 6 days         Rectal Tube 04/21/19 2100 rectal tube w/ balloon (indicate number of mLs) 5 days                Physical Exam   Constitutional: She appears well-developed and well-nourished. No distress. She is intubated.   HENT:   Head: Normocephalic and atraumatic.   Neck: No JVD present.   Cardiovascular: Normal rate and regular rhythm.    Pulmonary/Chest: She is intubated.   mechanically ventilated   Abdominal: Soft. She exhibits no distension. There is no rebound and no guarding.   Dressing c/d/i  MADHURI drain with serous output   Genitourinary:   Genitourinary Comments: Correa draining clear yellow  Left neph tube with clear yellow urine   Neurological: She is alert.   Skin: Skin is warm and dry. She is not diaphoretic. No pallor.       Significant Labs:    BMP:  Recent Labs   Lab 04/27/19  0338 04/28/19  0330 04/29/19  0300   * 145 142   K 3.8 4.3 3.7   * 109 105   CO2 25 28 29   BUN 30* 30* 31*   CREATININE 0.9 0.8 0.8   CALCIUM 9.2 9.1 9.1       CBC:   Recent Labs   Lab 04/27/19  0338 04/28/19  0330 04/29/19  0300   WBC 17.03* 15.89* 15.91*   HGB 8.7* 8.6* 8.7*   HCT 27.6* 28.4* 27.4*    189 215       All pertinent labs results from the past 24 hours have been reviewed.    Significant Imaging:  All pertinent imaging results/findings from the past 24 hours have been reviewed.        Assessment/Plan:     * Ureteral transection of left native kidney  Elizaamanda Huff is a 59 y.o. female s/p left ureteral injury on 4/12, presented with fever, AMS, and intraabdominal abscess, taken for washout and ligation of left ureter with neph tube placement on 4/21    - Management per SICU  - Vent per SICU, continue daily SBTs  - Original urine cultures with E. Coli, wound culture growing staph lugdeensis on Rocephin per ID recs. Rifampin for hardware. Febrile yesterday, ID re-consulted  - Maintain correa and neph tube at this time  - maintain MADHURI drain    Sepsis  As  above        VTE Risk Mitigation (From admission, onward)        Ordered     heparin (porcine) injection 5,000 Units  Every 8 hours      04/25/19 0730     Place sequential compression device  Until discontinued      04/20/19 2108     IP VTE HIGH RISK PATIENT  Once      04/20/19 2108          Hugh Higuera MD  Urology  [facility]

## 2019-04-29 NOTE — PLAN OF CARE
Reviewed BG levels and insulin regimen.     Goal BG while inpatient: 140-180 mg/dl  Current BG levels are at goal    NPO  On TF    Recommendations:   -Continue Novolog 4 units Q4h  -Low dose correction  -POC Q4h

## 2019-04-29 NOTE — CARE UPDATE
Spoke with Dr. Mustafa with ID regarding fevers x2 since de-escalating abx. Dr. Mustafa discussing with [employer] covering this weekend and recommending continuing current regiment and replacing ID consult at this time.  Vivi Claudio MD  7:53 PM

## 2019-04-30 LAB
ALBUMIN SERPL BCP-MCNC: 1.5 G/DL (ref 3.5–5.2)
ALLENS TEST: ABNORMAL
ALLENS TEST: ABNORMAL
ALP SERPL-CCNC: 198 U/L (ref 55–135)
ALT SERPL W/O P-5'-P-CCNC: 64 U/L (ref 10–44)
ANION GAP SERPL CALC-SCNC: 10 MMOL/L (ref 8–16)
AST SERPL-CCNC: 61 U/L (ref 10–40)
BASOPHILS # BLD AUTO: 0.03 K/UL (ref 0–0.2)
BASOPHILS NFR BLD: 0.2 % (ref 0–1.9)
BILIRUB SERPL-MCNC: 1.7 MG/DL (ref 0.1–1)
BUN SERPL-MCNC: 28 MG/DL (ref 6–20)
CALCIUM SERPL-MCNC: 9.1 MG/DL (ref 8.7–10.5)
CHLORIDE SERPL-SCNC: 102 MMOL/L (ref 95–110)
CO2 SERPL-SCNC: 29 MMOL/L (ref 23–29)
CREAT SERPL-MCNC: 0.7 MG/DL (ref 0.5–1.4)
DELSYS: ABNORMAL
DELSYS: ABNORMAL
DIFFERENTIAL METHOD: ABNORMAL
EOSINOPHIL # BLD AUTO: 0.3 K/UL (ref 0–0.5)
EOSINOPHIL NFR BLD: 2 % (ref 0–8)
ERYTHROCYTE [DISTWIDTH] IN BLOOD BY AUTOMATED COUNT: 14.4 % (ref 11.5–14.5)
EST. GFR  (AFRICAN AMERICAN): >60 ML/MIN/1.73 M^2
EST. GFR  (NON AFRICAN AMERICAN): >60 ML/MIN/1.73 M^2
GLUCOSE SERPL-MCNC: 185 MG/DL (ref 70–110)
HCO3 UR-SCNC: 30.7 MMOL/L (ref 24–28)
HCO3 UR-SCNC: 31.8 MMOL/L (ref 24–28)
HCT VFR BLD AUTO: 28.3 % (ref 37–48.5)
HGB BLD-MCNC: 8.6 G/DL (ref 12–16)
IMM GRANULOCYTES # BLD AUTO: 0.11 K/UL (ref 0–0.04)
IMM GRANULOCYTES NFR BLD AUTO: 0.8 % (ref 0–0.5)
INR PPP: 0.9 (ref 0.8–1.2)
LYMPHOCYTES # BLD AUTO: 1.3 K/UL (ref 1–4.8)
LYMPHOCYTES NFR BLD: 9.5 % (ref 18–48)
MAGNESIUM SERPL-MCNC: 1.6 MG/DL (ref 1.6–2.6)
MCH RBC QN AUTO: 27.3 PG (ref 27–31)
MCHC RBC AUTO-ENTMCNC: 30.4 G/DL (ref 32–36)
MCV RBC AUTO: 90 FL (ref 82–98)
MODE: ABNORMAL
MONOCYTES # BLD AUTO: 0.8 K/UL (ref 0.3–1)
MONOCYTES NFR BLD: 5.9 % (ref 4–15)
NEUTROPHILS # BLD AUTO: 11.1 K/UL (ref 1.8–7.7)
NEUTROPHILS NFR BLD: 81.6 % (ref 38–73)
NRBC BLD-RTO: 0 /100 WBC
PCO2 BLDA: 49.4 MMHG (ref 35–45)
PCO2 BLDA: 51.7 MMHG (ref 35–45)
PH SMN: 7.4 [PH] (ref 7.35–7.45)
PH SMN: 7.4 [PH] (ref 7.35–7.45)
PHOSPHATE SERPL-MCNC: 2.7 MG/DL (ref 2.7–4.5)
PLATELET # BLD AUTO: 274 K/UL (ref 150–350)
PMV BLD AUTO: 11.9 FL (ref 9.2–12.9)
PO2 BLDA: 44 MMHG (ref 40–60)
PO2 BLDA: 64 MMHG (ref 80–100)
POC BE: 6 MMOL/L
POC BE: 7 MMOL/L
POC SATURATED O2: 79 % (ref 95–100)
POC SATURATED O2: 92 % (ref 95–100)
POC TCO2: 32 MMOL/L (ref 23–27)
POC TCO2: 33 MMOL/L (ref 24–29)
POCT GLUCOSE: 225 MG/DL (ref 70–110)
POCT GLUCOSE: 229 MG/DL (ref 70–110)
POCT GLUCOSE: 273 MG/DL (ref 70–110)
POTASSIUM SERPL-SCNC: 3.9 MMOL/L (ref 3.5–5.1)
PROT SERPL-MCNC: 6.5 G/DL (ref 6–8.4)
PROTHROMBIN TIME: 9.6 SEC (ref 9–12.5)
RBC # BLD AUTO: 3.15 M/UL (ref 4–5.4)
SAMPLE: ABNORMAL
SAMPLE: ABNORMAL
SITE: ABNORMAL
SITE: ABNORMAL
SODIUM SERPL-SCNC: 141 MMOL/L (ref 136–145)
WBC # BLD AUTO: 13.66 K/UL (ref 3.9–12.7)

## 2019-04-30 PROCEDURE — 84100 ASSAY OF PHOSPHORUS: CPT

## 2019-04-30 PROCEDURE — 80053 COMPREHEN METABOLIC PANEL: CPT

## 2019-04-30 PROCEDURE — C9113 INJ PANTOPRAZOLE SODIUM, VIA: HCPCS | Performed by: ANESTHESIOLOGY

## 2019-04-30 PROCEDURE — 99900026 HC AIRWAY MAINTENANCE (STAT)

## 2019-04-30 PROCEDURE — 94761 N-INVAS EAR/PLS OXIMETRY MLT: CPT

## 2019-04-30 PROCEDURE — 99291 CRITICAL CARE FIRST HOUR: CPT | Mod: 24,GC,, | Performed by: SURGERY

## 2019-04-30 PROCEDURE — 27100092 HC HIGH FLOW DELIVERY CANNULA

## 2019-04-30 PROCEDURE — 27000221 HC OXYGEN, UP TO 24 HOURS

## 2019-04-30 PROCEDURE — 63600175 PHARM REV CODE 636 W HCPCS: Performed by: PHYSICIAN ASSISTANT

## 2019-04-30 PROCEDURE — 25000003 PHARM REV CODE 250: Performed by: PHYSICIAN ASSISTANT

## 2019-04-30 PROCEDURE — 99900035 HC TECH TIME PER 15 MIN (STAT)

## 2019-04-30 PROCEDURE — 63600175 PHARM REV CODE 636 W HCPCS: Performed by: UROLOGY

## 2019-04-30 PROCEDURE — 63600175 PHARM REV CODE 636 W HCPCS: Performed by: STUDENT IN AN ORGANIZED HEALTH CARE EDUCATION/TRAINING PROGRAM

## 2019-04-30 PROCEDURE — 63600175 PHARM REV CODE 636 W HCPCS: Performed by: ANESTHESIOLOGY

## 2019-04-30 PROCEDURE — 27100171 HC OXYGEN HIGH FLOW UP TO 24 HOURS

## 2019-04-30 PROCEDURE — 99233 SBSQ HOSP IP/OBS HIGH 50: CPT | Mod: ,,, | Performed by: PHYSICIAN ASSISTANT

## 2019-04-30 PROCEDURE — 25000003 PHARM REV CODE 250: Performed by: STUDENT IN AN ORGANIZED HEALTH CARE EDUCATION/TRAINING PROGRAM

## 2019-04-30 PROCEDURE — 83735 ASSAY OF MAGNESIUM: CPT

## 2019-04-30 PROCEDURE — 87205 SMEAR GRAM STAIN: CPT

## 2019-04-30 PROCEDURE — 87071 CULTURE AEROBIC QUANT OTHER: CPT

## 2019-04-30 PROCEDURE — 99233 PR SUBSEQUENT HOSPITAL CARE,LEVL III: ICD-10-PCS | Mod: ,,, | Performed by: PHYSICIAN ASSISTANT

## 2019-04-30 PROCEDURE — 85025 COMPLETE CBC W/AUTO DIFF WBC: CPT

## 2019-04-30 PROCEDURE — 94003 VENT MGMT INPAT SUBQ DAY: CPT

## 2019-04-30 PROCEDURE — 99291 PR CRITICAL CARE, E/M 30-74 MINUTES: ICD-10-PCS | Mod: 24,GC,, | Performed by: SURGERY

## 2019-04-30 PROCEDURE — 85610 PROTHROMBIN TIME: CPT

## 2019-04-30 PROCEDURE — 87040 BLOOD CULTURE FOR BACTERIA: CPT | Mod: 59

## 2019-04-30 PROCEDURE — 36600 WITHDRAWAL OF ARTERIAL BLOOD: CPT

## 2019-04-30 PROCEDURE — 20000000 HC ICU ROOM

## 2019-04-30 PROCEDURE — 82803 BLOOD GASES ANY COMBINATION: CPT

## 2019-04-30 RX ORDER — GLUCAGON 1 MG
1 KIT INJECTION
Status: DISCONTINUED | OUTPATIENT
Start: 2019-04-30 | End: 2019-05-01

## 2019-04-30 RX ORDER — INSULIN ASPART 100 [IU]/ML
5 INJECTION, SOLUTION INTRAVENOUS; SUBCUTANEOUS
Status: DISCONTINUED | OUTPATIENT
Start: 2019-04-30 | End: 2019-04-30

## 2019-04-30 RX ORDER — INSULIN ASPART 100 [IU]/ML
5 INJECTION, SOLUTION INTRAVENOUS; SUBCUTANEOUS
Status: DISCONTINUED | OUTPATIENT
Start: 2019-05-01 | End: 2019-04-30

## 2019-04-30 RX ORDER — VANCOMYCIN 2 GRAM/500 ML IN 0.9 % SODIUM CHLORIDE INTRAVENOUS
2000 ONCE
Status: COMPLETED | OUTPATIENT
Start: 2019-04-30 | End: 2019-04-30

## 2019-04-30 RX ORDER — INSULIN ASPART 100 [IU]/ML
1-10 INJECTION, SOLUTION INTRAVENOUS; SUBCUTANEOUS EVERY 6 HOURS PRN
Status: DISCONTINUED | OUTPATIENT
Start: 2019-04-30 | End: 2019-05-01

## 2019-04-30 RX ORDER — INSULIN ASPART 100 [IU]/ML
1-10 INJECTION, SOLUTION INTRAVENOUS; SUBCUTANEOUS
Status: DISCONTINUED | OUTPATIENT
Start: 2019-04-30 | End: 2019-04-30

## 2019-04-30 RX ORDER — IBUPROFEN 200 MG
24 TABLET ORAL
Status: DISCONTINUED | OUTPATIENT
Start: 2019-04-30 | End: 2019-05-01

## 2019-04-30 RX ORDER — ENOXAPARIN SODIUM 100 MG/ML
40 INJECTION SUBCUTANEOUS EVERY 24 HOURS
Status: DISCONTINUED | OUTPATIENT
Start: 2019-04-30 | End: 2019-05-03

## 2019-04-30 RX ORDER — IBUPROFEN 200 MG
16 TABLET ORAL
Status: DISCONTINUED | OUTPATIENT
Start: 2019-04-30 | End: 2019-05-01

## 2019-04-30 RX ORDER — VANCOMYCIN HCL IN 5 % DEXTROSE 1.25 G/25
1250 PLASTIC BAG, INJECTION (ML) INTRAVENOUS
Status: DISCONTINUED | OUTPATIENT
Start: 2019-04-30 | End: 2019-05-02

## 2019-04-30 RX ADMIN — HEPARIN SODIUM 5000 UNITS: 5000 INJECTION, SOLUTION INTRAVENOUS; SUBCUTANEOUS at 05:04

## 2019-04-30 RX ADMIN — ACETAMINOPHEN 650 MG: 325 TABLET ORAL at 07:04

## 2019-04-30 RX ADMIN — FUROSEMIDE 40 MG: 10 INJECTION, SOLUTION INTRAMUSCULAR; INTRAVENOUS at 05:04

## 2019-04-30 RX ADMIN — INSULIN ASPART 4 UNITS: 100 INJECTION, SOLUTION INTRAVENOUS; SUBCUTANEOUS at 04:04

## 2019-04-30 RX ADMIN — DEXMEDETOMIDINE HYDROCHLORIDE 1.4 MCG/KG/HR: 400 INJECTION INTRAVENOUS at 08:04

## 2019-04-30 RX ADMIN — INSULIN ASPART 2 UNITS: 100 INJECTION, SOLUTION INTRAVENOUS; SUBCUTANEOUS at 08:04

## 2019-04-30 RX ADMIN — VANCOMYCIN HYDROCHLORIDE 1250 MG: 100 INJECTION, POWDER, LYOPHILIZED, FOR SOLUTION INTRAVENOUS at 08:04

## 2019-04-30 RX ADMIN — INSULIN ASPART 4 UNITS: 100 INJECTION, SOLUTION INTRAVENOUS; SUBCUTANEOUS at 08:04

## 2019-04-30 RX ADMIN — HYDRALAZINE HYDROCHLORIDE 10 MG: 20 INJECTION INTRAMUSCULAR; INTRAVENOUS at 09:04

## 2019-04-30 RX ADMIN — DEXMEDETOMIDINE HYDROCHLORIDE 1 MCG/KG/HR: 400 INJECTION INTRAVENOUS at 01:04

## 2019-04-30 RX ADMIN — FUROSEMIDE 40 MG: 10 INJECTION, SOLUTION INTRAMUSCULAR; INTRAVENOUS at 08:04

## 2019-04-30 RX ADMIN — FENTANYL CITRATE 50 MCG: 50 INJECTION INTRAMUSCULAR; INTRAVENOUS at 09:04

## 2019-04-30 RX ADMIN — DEXMEDETOMIDINE HYDROCHLORIDE 0.9 MCG/KG/HR: 400 INJECTION INTRAVENOUS at 09:04

## 2019-04-30 RX ADMIN — ENOXAPARIN SODIUM 40 MG: 100 INJECTION SUBCUTANEOUS at 04:04

## 2019-04-30 RX ADMIN — POTASSIUM CHLORIDE 40 MEQ: 400 INJECTION, SOLUTION INTRAVENOUS at 05:04

## 2019-04-30 RX ADMIN — INSULIN ASPART 5 UNITS: 100 INJECTION, SOLUTION INTRAVENOUS; SUBCUTANEOUS at 11:04

## 2019-04-30 RX ADMIN — INSULIN ASPART 3 UNITS: 100 INJECTION, SOLUTION INTRAVENOUS; SUBCUTANEOUS at 08:04

## 2019-04-30 RX ADMIN — RIFAMPIN 300 MG: 600 INJECTION, POWDER, LYOPHILIZED, FOR SOLUTION INTRAVENOUS at 01:04

## 2019-04-30 RX ADMIN — VANCOMYCIN HYDROCHLORIDE 2000 MG: 100 INJECTION, POWDER, LYOPHILIZED, FOR SOLUTION INTRAVENOUS at 08:04

## 2019-04-30 RX ADMIN — CHLORHEXIDINE GLUCONATE 0.12% ORAL RINSE 15 ML: 1.2 LIQUID ORAL at 08:04

## 2019-04-30 RX ADMIN — CEFTRIAXONE 2 G: 2 INJECTION, SOLUTION INTRAVENOUS at 04:04

## 2019-04-30 RX ADMIN — PANTOPRAZOLE SODIUM 40 MG: 40 INJECTION, POWDER, LYOPHILIZED, FOR SOLUTION INTRAVENOUS at 08:04

## 2019-04-30 RX ADMIN — INSULIN ASPART 2 UNITS: 100 INJECTION, SOLUTION INTRAVENOUS; SUBCUTANEOUS at 11:04

## 2019-04-30 RX ADMIN — RIFAMPIN 300 MG: 600 INJECTION, POWDER, LYOPHILIZED, FOR SOLUTION INTRAVENOUS at 02:04

## 2019-04-30 RX ADMIN — MAGNESIUM SULFATE HEPTAHYDRATE 2 G: 40 INJECTION, SOLUTION INTRAVENOUS at 05:04

## 2019-04-30 RX ADMIN — DEXMEDETOMIDINE HYDROCHLORIDE 0.8 MCG/KG/HR: 400 INJECTION INTRAVENOUS at 05:04

## 2019-04-30 NOTE — PLAN OF CARE
Reviewed BG levels and insulin regimen.     Goal BG while inpatient: 140-180 mg/dl  Current BG levels are above goal    NPO  On TF at 40 ml/h    Recommendations:   -Increase Novolog to 5 units Q4h  -Low dose correction  -POC Q4h

## 2019-04-30 NOTE — CONSULTS
[facility]  Infectious Disease  Consult Note    Patient Name: Mariann Huff  MRN: [id number]  Admission Date: 4/20/2019  Hospital Length of Stay: 9 days  Attending Physician: Paul Carlson MD  Primary Care Provider: Jasbir Haney MD     Isolation Status: No active isolations    Patient information was obtained from patient, spouse/SO and records.      Consults  Assessment/Plan:     Sepsis  58 y/o female with HTN, DMII, CAD, NSTEMI, s/p L5-S1 OLIF with NSGY 4/12 c/b intraoperative left ureteral injury and underwent ureteroureteral anastomoses and ureteral stent placement. She was discharged with a correa and MADHURI drain that was removed on 4/16. She was seen by urology and anticipated stent removal in 2-3 months (6/2019).  She presents to ED with AMS and E.coli septicemia found to have air and fluid collection of left anterior prevertebral soft tissue with left ureter involvement. She underwent ex-lap and washout on 4/21. We are consulted for abx recommendations.      Per op note,  There were pocket of purulence noted and evacuated, sent for culture of left retroperitoneum to pole of left kidney. The stent was visible. Posterior to the stent there was a pocket of purulent fluid and exposed hardware along the spinal vertebrae. The tissue along the distal and proximal end of ureter were friable and unhealthy. She underwent left nephrostomy tube placement. The stent was removed and several metal clips were placed on proximal end of the ureteral.     Patient has developed leukocytosis.  OR cultures with Staph Lugdenensis - not currently covered.  Stable and non septic - no obvious source.  Patient does appear to be having diarrhea - C diff negative but suspect may need restesting if WBC increases and no other source found.  QTc long at 480 - considered change to Cipro considered but will add vanc 1st to see if respond to covering staph lugdenensis.  CBC shows no eosinophilia to suggest drug reaction and  no rash.    Recommendations:  1. Continue ceftriaxone [id number] and rifampin 300 mg q12hr IV (transition to oral once able) given hardware in place.   2. Add vancomycin 15mg/kg IV q12 with trough goal 15-24  3. Rec line changes jonathan cvc  4. If no improvement will rec CT ABD/Pelvis and repeat C diff as appears to have loose stool  5. Anticipate 6 weeks of IV abx from first negative blood cultures (4/23-6/4/19)  followed by oral abx suppression given retained hardware.   6. Seen with ID staff              Thank you for your consult. I will follow-up with patient. Please contact us if you have any additional questions.    POLLY Jerome  Infectious Disease  [facility]    Subjective:     Principal Problem: Ureteral transection of left native kidney    HPI: 58 y/o female with HTN, DMII, CAD, NSTEMI, s/p L5-S1 OLIF with NSGY 4/12 sustained intraoperative laceration and underwent ureteroureteral anastomoses and ureteral stent placement. She was discharged with a correa and MADHURI drain that was removed on 4/16. She was seen by urology and anticipated stent removal in 2-3 months (6/2019).  She presents to ED with AMS and sepsis. She was febrile 103 in ED. WBC 5K on admit. Lactate 4.6. Cath UA with 3+leukocytes, >100 WBcs and many bacteria.     MRI: Postsurgical change of L5-S1 posterior spinal fusion and anterior interbody fusion with a 4.4 cm fluid fluid collection in the left anterior prevertebral soft tissues at the level of the L5-S1 disc space.  Findings may represent postoperative seroma or evolving hematoma however, urinoma not excluded.  No definite abscess although correlation is advised.    Irregular flow void involving the right common iliac vein, underlying thrombus not excluded.      CT: air collection within the anterior paraspinous soft tissues through which the left ureter courses. Given that the ureter courses through this, communication of the ureter with this collection is not  excluded.  There is a ureteral stent within the left ureter.Punctate foci of air in L5-S1.  2. Air within the left renal collecting system, along the left ureter, and within the urinary bladder, correlation advised.    This was not amendable for drainage by IR so she was taken to OR for washout.     She underwent ex-lap of left ureter and left nephrostomy tube placement 4/21/19.   Per op note,  There were pocket of purulence noted and evacuated, sent for culture of left retroperitoneum to pole of left kidney. The stent was visible. Posterior to the stent there was a pocket of purulent fluid and exposed hardware along the spinal vertebrae. The tissue along the distal and proximal end of ureter were friable and unhealthy. She underwent left nephrostomy tube placement. The stent was removed and several metal clips were placed on proximal end of the ureteral. MADHURI drain was placed into left retroperitoneal.     Blood cultures were are positive for E coli..   Urine cultures +E.coli  OR cultures were positive for Staph Ludenensis  She is was treated with zosyn. She is in the ICU, intubated, sedated    ID had seen the patient and there was concern for hardware involvement given proximity and the patient was placed on ceftriaxone and rifampin with plan for 6 weeks of abx.  ID now reconsulted due to fever and leukocytosis.  Patient denies any abdominal pain , fever, chills or sweats.  Repeat Bblood cultures and urine cultures are no growth.      Past Medical History:   Diagnosis Date    Anticoagulant long-term use     Asthma     Back pain     CAD (coronary artery disease)     s/p stentimg 2003 (2),2009 (1)    Carotid artery stenosis     Diabetes mellitus type 2 in obese     HTN (hypertension), benign     Hyperlipidemia     Keloid cicatrix     NPDR (nonproliferative diabetic retinopathy) 8/17/2015    NSTEMI (non-ST elevated myocardial infarction)     Nuclear sclerosis - Right Eye 3/18/2014    Primary localized  osteoarthrosis, lower leg 2014    Sleep apnea        Past Surgical History:   Procedure Laterality Date    CARDIAC CATHETERIZATION      cataract extraction left eye      cataracts      CATHETERIZATION, HEART, LEFT Left 2014    Performed by Wilman Kim MD at [facility] CATH LAB     SECTION, LOW TRANSVERSE      CORONARY ANGIOPLASTY      Creation, Nephrostomy, Percutaneous Left 2019    Performed by Karina Surgeon at [facility] KARINA    ESOPHAGOGASTRODUODENOSCOPY (EGD) N/A 2016    Performed by Gardenia Adamson MD at [facility] ENDO (4TH FLR)    EXCISION TURBINATE, SUBMUCOUS      FUSION, SPINE, LUMBAR, ANTERIOR APPROACH Left L5-S1 Anterior to Psoa Interbody Fusion, L5-S1 Posterior Instrumentation Left 2019    Performed by Mk George MD at [facility] OR 2ND FLR    HAND SURGERY Left     HAND SURGERY Right     torn ligament repair/ Dr. Yeboah    HYSTERECTOMY      Injection,steroid,epidural,transforaminal approach - Bilateral - S1 Bilateral 2018    Performed by Tl Abreu MD at [facility] PAIN MGT    Injection-steroid-epidural-caudal N/A 2019    Performed by Dave Bentley Jr., MD at [facility] PAIN MGT    INSERTION-INTRAOCULAR LENS (IOL) Right 2015    Performed by Good Domingo MD at [facility] OR 1ST FLR    LAPAROTOMY, EXPLORATORY; LIGATION OF LEFT URETER; DOUBLE J STENT REMOVAL LEFT URETER Left 2019    Performed by Paul Carlson MD at [facility] OR 2ND FLR    left foot surgery      left wrist surgery      NASAL SEPTUM SURGERY  5/7/15    PHACOEMULSIFICATION-ASPIRATION-CATARACT Right 2015    Performed by Good Domingo MD at [facility] OR 1ST FLR    REPAIR, URETER  2019    Performed by Mk George MD at [facility] OR 2ND FLR    RESECTION-TURBINATES (SMR) N/A 2015    Performed by Dileep Dubois III, MD at [facility] OR 2ND FLR    rt elbow surgery      S/P LAD COATED STENT  2010    6 total     S/P OM1 STENT  2003    SEPTOPLASTY N/A  "5/7/2015    Performed by [facility]ileep [facility]ois III, MD at [facility] OR 2ND FLR    SINUS SURGERY      FChinmayEChinmayS.S.    SINUS SURGERY FUNCTIONAL ENDOSCOPIC WITH NAVIGATION WITH MAXILLARIES, ETHMOIDS, LEFT SPHENOID, LEFT LOLY N/A 5/7/2015    Performed by Dileep Dubois III, MD at [facility] OR 2ND FLR    STENT, URETERAL placement  4/12/2019    Performed by Robin Boyd MD at [facility] OR 2ND FLR    TUBAL LIGATION         Review of patient's allergies indicates:  No Known Allergies    Medications:  Medications Prior to Admission   Medication Sig    albuterol (PROVENTIL/VENTOLIN HFA) 90 mcg/actuation inhaler Inhale 2 puffs into the lungs every 6 (six) hours as needed for Wheezing.    amLODIPine (NORVASC) 10 MG tablet TAKE 1 TABLET (10 MG TOTAL) BY MOUTH ONCE DAILY.    aspirin 81 mg Tab Take 81 mg by mouth. 1 Tablet Oral Every day    atorvastatin (LIPITOR) 40 MG tablet TAKE 1 TABLET (40 MG TOTAL) BY MOUTH ONCE DAILY.    ACCU-CHEK EDIN PLUS METER [facility]     BD INSULIN PEN NEEDLE UF MINI 31 x 3/16 " Ndle USE 4 TIMES A DAY    blood sugar diagnostic (ACCU-CHEK EDIN PLUS TEST STRP) Strp TEST BLOOD SUGARS 4 TIMES DAILY    chlorthalidone (HYGROTEN) 50 MG Tab Take 1 tablet (50 mg total) by mouth once daily.    cyclobenzaprine (FLEXERIL) 10 MG tablet TAKE 1 TABLET BY MOUTH 3 TIMES A DAY AS NEEDED FOR MUSCLE SPASMS. NO WORKING OR DRIVING ON THIS MED    esomeprazole (NEXIUM) 40 MG capsule TAKE 1 CAPSULE (40 MG TOTAL) BY MOUTH BEFORE BREAKFAST.    fenofibrate micronized (LOFIBRA) 134 MG Cap TAKE 1 CAPSULE (134 MG TOTAL) BY MOUTH BEFORE BREAKFAST.    flash glucose scanning reader (FREESTYLE MISTI 14 DAY READER) Misc 1 each by Misc.(Non-Drug; Combo Route) route once daily.    flash glucose sensor (FREESTYLE MISTI 14 DAY SENSOR) Kit 1 each by Misc.(Non-Drug; Combo Route) route once daily.    gabapentin (NEURONTIN) 100 MG capsule Take 2 capsules (200 mg total) by mouth every evening.    insulin aspart U-100 (NOVOLOG FLEXPEN U-100 INSULIN) " "100 unit/mL InPn pen INJECT 35 U AM, 45 U AT NOON, 35 U PM.150-200 +2, 201-250 +4, 251-300 +6, 301-350 +8, >350 +10    insulin glargine, TOUJEO, (TOUJEO SOLOSTAR U-300 INSULIN) 300 unit/mL (1.5 mL) InPn pen Inject 50 Units into the skin 2 (two) times daily.    INVOKANA 300 mg Tab tablet Take 1 tablet (300 mg total) by mouth once daily.    irbesartan (AVAPRO) 300 MG tablet Take 1 tablet (300 mg total) by mouth every evening.    lancets 30 gauge Misc     metoprolol succinate (TOPROL-XL) 100 MG 24 hr tablet TAKE 1 TABLET (100 MG TOTAL) BY MOUTH ONCE DAILY.    nitroGLYCERIN (NITROSTAT) 0.4 MG SL tablet Take one every 2-3 min till chestpain relief for 3 times and if still no relief, call MD or come to ED    omega-3 acid ethyl esters (LOVAZA) 1 gram capsule Take 1 g by mouth once daily.     pen needle, diabetic (BD ULTRA-FINE GRABIEL PEN NEEDLES) 32 gauge x 5/32" Ndle For use with insulin pens daily 4 times a day    prasugrel (EFFIENT) 10 mg Tab TAKE 1 TABLET (10 MG TOTAL) BY MOUTH ONCE DAILY.    tamsulosin (FLOMAX) 0.4 mg Cap Take 1 capsule (0.4 mg total) by mouth every evening.    tramadol (ULTRAM) 50 mg tablet Take 1 tablet (50 mg total) by mouth 3 (three) times daily as needed for Pain.    TRULICITY 1.5 mg/0.5 mL PnIj INJECT 1.5 MG INTO THE SKIN EVERY 7 DAYS.    zolpidem (AMBIEN) 5 MG Tab Take 1 tablet (5 mg total) by mouth nightly as needed.     Antibiotics (From admission, onward)    Start     Stop Route Frequency Ordered    04/24/19 1430  rifAMpin (RIFADIN) 300 mg in sodium chloride 0.9% 100 mL IVPB      -- IV Every 12 hours (non-standard times) 04/24/19 1325    04/23/19 1600  cefTRIAXone (ROCEPHIN) 2 g in dextrose 5 % 50 mL IVPB      -- IV Every 24 hours (non-standard times) 04/23/19 1044        Antifungals (From admission, onward)    None        Antivirals (From admission, onward)    None           Immunization History   Administered Date(s) Administered    Influenza 11/19/2009    Influenza - " Quadrivalent 10/10/2014, 10/09/2015, 10/24/2016    Influenza - Quadrivalent - PF 11/27/2017    Influenza A (H1N1) 2009 Monovalent - IM 11/19/2009    Pneumococcal Polysaccharide - 23 Valent 06/19/2014    Td - PF (ADULT) 04/17/2017       Family History     Problem Relation (Age of Onset)    Diabetes Mother, Father, Sister, Brother, Sister    Heart attack Father    Heart disease Mother    Leukemia Father    No Known Problems Sister, Brother, Brother, Maternal Grandmother, Maternal Grandfather, Paternal Grandmother, Paternal Grandfather, Son, Son, Maternal Aunt, Maternal Uncle, Paternal Aunt, Paternal Uncle        Social History     Socioeconomic History    Marital status:      Spouse name: Shamir    Number of children: 2    Years of education: Not on file    Highest education level: Not on file   Occupational History    Occupation: [employer]     Employer: [employer]     Employer: [employer]     Employer: [employer]   Social Needs    Financial resource strain: Not on file    Food insecurity:     Worry: Not on file     Inability: Not on file    Transportation needs:     Medical: Not on file     Non-medical: Not on file   Tobacco Use    Smoking status: Never Smoker    Smokeless tobacco: Never Used   Substance and Sexual Activity    Alcohol use: No     Alcohol/week: 0.0 oz    Drug use: No    Sexual activity: Yes     Partners: Male     Birth control/protection: Post-menopausal     Comment:    Lifestyle    Physical activity:     Days per week: Not on file     Minutes per session: Not on file    Stress: Not on file   Relationships    Social connections:     Talks on phone: Not on file     Gets together: Not on file     Attends Spiritism service: Not on file     Active member of club or organization: Not on file     Attends meetings of clubs or organizations: Not on file     Relationship status: Not on file   Other Topics Concern    Are you pregnant or  think you may be? No    Breast-feeding No   Social History Narrative    . 2 children.      Review of Systems   Reason unable to perform ROS: limited as intubated but answering with head nod.   Constitutional: Negative for activity change, chills, diaphoresis and fever.   Respiratory: Negative for cough, shortness of breath and wheezing.    Cardiovascular: Negative for chest pain.   Gastrointestinal: Negative for abdominal pain, constipation, diarrhea, nausea and vomiting.   Genitourinary: Negative for dysuria, frequency and urgency.   Neurological: Negative for dizziness.   Hematological: Does not bruise/bleed easily.     Objective:     Vital Signs (Most Recent):  Temp: 99.5 °F (37.5 °C) (04/29/19 1730)  Pulse: 90 (04/29/19 1730)  Resp: 16 (04/29/19 0335)  BP: (!) 156/63 (04/29/19 1730)  SpO2: 98 % (04/29/19 1730) Vital Signs (24h Range):  Temp:  [99.1 °F (37.3 °C)-101.1 °F (38.4 °C)] 99.5 °F (37.5 °C)  Pulse:  [72-99] 90  Resp:  [16-18] 16  SpO2:  [95 %-100 %] 98 %  BP: (119-220)/() 156/63     Weight: 84.8 kg (186 lb 15.2 oz)  Body mass index is 32.09 kg/m².    Estimated Creatinine Clearance: 80.7 mL/min (based on SCr of 0.8 mg/dL).    Physical Exam   Constitutional: She is oriented to person, place, and time. She appears well-developed and well-nourished. No distress.       HENT:   Head: Normocephalic and atraumatic.   Cardiovascular: Normal rate, regular rhythm and normal heart sounds. Exam reveals no gallop and no friction rub.   No murmur heard.  Pulmonary/Chest: Effort normal and breath sounds normal. No respiratory distress. She has no wheezes. She has no rales.   Abdominal: Soft. Bowel sounds are normal. She exhibits no distension and no mass. There is no tenderness. There is no rebound and no guarding.   Musculoskeletal: She exhibits no edema.   Neurological: She is alert and oriented to person, place, and time.   Skin: Skin is warm and dry. She is not diaphoretic.   Psychiatric: She has a  normal mood and affect. Her behavior is normal.       Significant Labs:   Blood Culture:   Recent Labs   Lab 04/20/19  1711 04/21/19  1835 04/23/19  0430 04/23/19  0500 04/24/19  0950   LABBLOO Gram stain aer bottle: Gram negative rods  Gram stain flaco bottle: Gram negative rods   Results called to and read back by: Pancho Mars RN  04/21/2019  03:18  ESCHERICHIA COLI  For susceptibility see order #[id number]   Gram stain aer bottle: Gram negative rods  Results called to and read back by: Carmenza Wallace RN 04/22/2019  10:14  ESCHERICHIA COLI  For susceptibility see order #[id number]   No growth after 5 days. No growth after 5 days. No growth after 5 days.     CBC:   Recent Labs   Lab 04/28/19  0330 04/29/19  0300   WBC 15.89* 15.91*   HGB 8.6* 8.7*   HCT 28.4* 27.4*    215     CMP:   Recent Labs   Lab 04/28/19  0330 04/29/19  0300    142   K 4.3 3.7    105   CO2 28 29   * 156*   BUN 30* 31*   CREATININE 0.8 0.8   CALCIUM 9.1 9.1   PROT 6.0 6.2   ALBUMIN 1.5* 1.5*   BILITOT 3.4* 2.5*   ALKPHOS 209* 196*   AST 47* 64*   ALT 84* 73*   ANIONGAP 8 8   EGFRNONAA >60.0 >60.0     Urine Culture:   Recent Labs   Lab 04/20/19  1711 04/21/19  0640 04/21/19  2256 04/25/19  0252   LABURIN ESCHERICHIA COLI  >100,000 cfu/ml   No growth No significant growth No growth     Urine Studies:   Recent Labs   Lab 04/25/19  0252   COLORU Argelia   APPEARANCEUA Cloudy*   PHUR 5.0   SPECGRAV 1.020   PROTEINUA 1+*   GLUCUA 3+*   KETONESU 1+*   BILIRUBINUA Negative   OCCULTUA 3+*   NITRITE Negative   LEUKOCYTESUR 3+*   RBCUA 25*   WBCUA 55*   BACTERIA Few*   SQUAMEPITHEL 1   HYALINECASTS 0     Wound Culture:   Recent Labs   Lab 04/21/19  0125   LABAERO STAPHYLOCOCCUS LUGDUNENSIS  Rare       All pertinent labs within the past 24 hours have been reviewed.    Significant Imaging: I have reviewed all pertinent imaging results/findings within the past 24 hours.   X-Ray Chest 1 View [[id number]] Resulted: 04/29/19 0755    Order Status: Completed Updated: 04/29/19 0753   Narrative:     EXAMINATION:  XR CHEST 1 VIEW    CLINICAL HISTORY:  intubated;    FINDINGS:  Tubes and lines a good position.  Heart size is normal there is moderate edema and no change.   Impression:       See above    Pulmonary edema pneumonia aspiration or sepsis.      Electronically signed by: Mart Norris MD  Date: 04/29/2019  Time: 07:50   X-Ray Chest 1 View [[id number]] Resulted: 04/28/19 0932   Order Status: Completed Updated: 04/28/19 0935   Narrative:     EXAMINATION:  XR CHEST 1 VIEW    CLINICAL HISTORY:  intubated;.    TECHNIQUE:  Single frontal portable view of the chest was performed.    COMPARISON:  April 27, 2019 at 06:29    FINDINGS:  Support devices: ET tube, NG tube, and right internal jugular central venous catheter remain.    There has not been any detrimental change since April 27, 2019 at 06:29.   Impression:       No detrimental change.      Electronically signed by: Sarah Mora MD  Date: 04/28/2019  Time: 09:32   X-Ray Chest 1 View [[id number]] Resulted: 04/27/19 1350   Order Status: Completed Updated: 04/27/19 1352   Narrative:     EXAMINATION:  XR CHEST 1 VIEW    CLINICAL HISTORY:  intubated;.    TECHNIQUE:  Single frontal portable view of the chest was performed.    COMPARISON:  April 26, 2019 at 05:54    FINDINGS:  Support devices: ET tube, NG tube, and right internal jugular central venous catheter remain.    There has not been any detrimental change since April 26, 2019 at 05:54.   Impression:       No detrimental change.      Electronically signed by: Sarah Mora MD  Date: 04/27/2019  Time: 13:50   X-Ray Chest 1 View [[id number]] Resulted: 04/26/19 0741   Order Status: Completed Updated: 04/26/19 0744   Narrative:     EXAMINATION:  XR CHEST 1 VIEW    CLINICAL HISTORY:  Intubated. Eval pneumonia.;    COMPARISON:  Comparison is made to 04/25/2019.    FINDINGS:  Endotracheal tube tip lies 1.5 cm above the level of the barb.   There has been partial interval clearing of airspace consolidation on both sides since 04/25/2019, with no significant detrimental interval change in the appearance of chest since that time observed.  No pneumothorax.   Impression:       As above      Electronically signed by: Christian Moreno MD  Date: 04/26/2019  Time: 07:41   X-Ray Chest 1 View [[id number]] Resulted: 04/25/19 0656   Order Status: Completed Updated: 04/25/19 0659   Narrative:     EXAMINATION:  XR CHEST 1 VIEW    CLINICAL HISTORY:  Intubated. Eval pneumonia.;    COMPARISON:  Comparison is made to 04/24/2019 and 04/21/2019.    FINDINGS:  Endotracheal tube tip lies 1.5 cm above the barb.  Distal aspect of the enteric tube lies distal to the GE junction, the tube tip projected inferior to the imaged volume.  Right jugular origin vascular catheter is in the superior vena cava.  Heart size and the appearance of the cardiomediastinal silhouette have not changed appreciably since the examinations referenced above.  Pulmonary parenchymal opacity is again noted bilaterally consistent with widespread airspace consolidation.  This appears more pronounced on the current examination than on 04/24/2019, with progressive worsening since 04/21/2019.  Some of the opacity in the inferior hemithoraces on each side may reflect associated pleural fluid.  No pneumothorax.   Impression:       Worsening bilateral airspace consolidation.      Electronically signed by: Christian Moreno MD  Date: 04/25/2019  Time: 06:56   X-Ray Chest 1 View [[id number]] Resulted: 04/24/19 1136   Order Status: Completed Updated: 04/24/19 1138   Narrative:     EXAMINATION:  XR CHEST 1 VIEW    CLINICAL HISTORY:  Intubated. Eval pneumonia.;    FINDINGS:  ET tip T5 central line mid SVC NG tip fundus.  There is cardiomegaly aortic plaque moderate-severe edema pleural fluid and slight worsening.   Impression:       See above    Pulmonary edema pneumonia aspiration or sepsis.      Electronically signed by:  Mart Norris MD  Date: 04/24/2019  Time: 11:36   X-Ray Abdomen AP 1 View [[id number]] Resulted: 04/23/19 0752   Order Status: Completed Updated: 04/23/19 0755   Narrative:     EXAMINATION:  XR ABDOMEN AP 1 VIEW    CLINICAL HISTORY:  OG placement;    TECHNIQUE:  AP View(s) of the abdomen was performed.    COMPARISON:  April 12, 2019.    FINDINGS:  OG tube with its tip just within the stomach.  Tube could be advanced at least 5 cm.  Probable nephrostomy tube surgical drain on the left.  Postop changes in the spine.  There is some air in the left upper quadrant.   Impression:       OG tube just within the stomach.  Tube could be advanced at least 5 cm.  Postop changes above.      Electronically signed by: Mk Reilly MD  Date: 04/23/2019  Time: 07:52

## 2019-04-30 NOTE — SUBJECTIVE & OBJECTIVE
Interval History/Significant Events: Febrile to 100.5 overnight. Doing well on PAV+ 35% overnight.     Follow-up For: Procedure(s) (LRB):  Creation, Nephrostomy, Percutaneous (Left)    Post-Operative Day: 8 Days Post-Op    Objective:     Vital Signs (Most Recent):  Temp: 99.1 °F (37.3 °C) (04/30/19 1100)  Pulse: 83 (04/30/19 1200)  Resp: 16 (04/29/19 0335)  BP: (!) 169/77 (04/30/19 1145)  SpO2: 98 % (04/30/19 1200) Vital Signs (24h Range):  Temp:  [97.2 °F (36.2 °C)-100.5 °F (38.1 °C)] 99.1 °F (37.3 °C)  Pulse:  [] 83  SpO2:  [93 %-100 %] 98 %  BP: (138-220)/() 169/77     Weight: 84.4 kg (186 lb 1.1 oz)  Body mass index is 31.94 kg/m².      Intake/Output Summary (Last 24 hours) at 4/30/2019 1217  Last data filed at 4/30/2019 1200  Gross per 24 hour   Intake 2514.6 ml   Output 5075 ml   Net -2560.4 ml       Physical Exam   Constitutional: She appears well-developed and well-nourished.   HENT:   Head: Normocephalic and atraumatic.   Intubated   Eyes: Right eye exhibits no discharge. Left eye exhibits no discharge.   Neck: Normal range of motion. Neck supple.   Cardiovascular: Normal rate and regular rhythm.   Pulmonary/Chest: Effort normal. No respiratory distress.   Intubated, secretions suctioned.    Abdominal:   Midline incision c/d/i.  MADHURI with scant serous drainage.  Abdomen soft, non-distended. Rectal tube with watery brown output.     Genitourinary:   Genitourinary Comments: Nephrostomy tube in place with watery dark yellow output.  Fontenot catheter+   Musculoskeletal: Normal range of motion.   Neurological:   Able to follow commands, denying pain. Lightly sedated on precedex gtt   Skin: Skin is warm and dry.   Vitals reviewed.    Vents:  Vent Mode: Spont (04/30/19 1059)  Set Rate: 16 bmp (04/29/19 0911)  Vt Set: 400 mL (04/29/19 0911)  Pressure Support: 0 cmH20 (04/29/19 1014)  PEEP/CPAP: 5 cmH20 (04/30/19 1059)  Oxygen Concentration (%): 49 (04/30/19 1200)  Peak Airway Pressure: 14 cmH2O (04/30/19  1059)  Plateau Pressure: 35 cmH20 (04/30/19 1059)  Total Ve: 6.1 mL (04/30/19 1059)  Negative Inspiratory Force (cm H2O): -18 (04/27/19 1306)  F/VT Ratio<105 (RSBI): (!) 41.78 (04/29/19 0335)    Lines/Drains/Airways     Central Venous Catheter Line                 Percutaneous Central Line Insertion/Assessment - triple lumen  04/21/19 0100 right internal jugular 9 days          Drain                 Closed/Suction Drain 04/21/19 0300 LLQ 9 days         NG/OG Tube 04/21/19 0728 Center mouth 9 days         Nephrostomy 04/21/19 0629 Left 8 Fr. 9 days         Urethral Catheter 04/20/19 1711 Latex 16 Fr. 9 days         Rectal Tube 04/21/19 2100 rectal tube w/ balloon (indicate number of mLs) 8 days          Airway                 Airway - Non-Surgical 04/21/19 0029 Endotracheal Tube 9 days                Significant Labs:    ABG:  None today    CBC/Anemia Profile:  Recent Labs   Lab 04/29/19  0300 04/30/19  0300   WBC 15.91* 13.66*   HGB 8.7* 8.6*   HCT 27.4* 28.3*    274   MCV 88 90   RDW 14.8* 14.4        Chemistries:  Recent Labs   Lab 04/29/19  0300 04/30/19  0300    141   K 3.7 3.9    102   CO2 29 29   BUN 31* 28*   CREATININE 0.8 0.7   CALCIUM 9.1 9.1   ALBUMIN 1.5* 1.5*   PROT 6.2 6.5   BILITOT 2.5* 1.7*   ALKPHOS 196* 198*   ALT 73* 64*   AST 64* 61*   MG 1.6 1.6   PHOS 2.9 2.7       Significant Imaging:  I have reviewed all pertinent imaging results/findings within the past 24 hours.

## 2019-04-30 NOTE — PROGRESS NOTES
Performed a Mini BAL at 8:45am.  Sample labeled and sent to the LAB. Patient became a little agitated, but otherwise tolerated well.  Will continue to monitor.

## 2019-04-30 NOTE — ASSESSMENT & PLAN NOTE
58 y/o female with HTN, DMII, CAD, NSTEMI, s/p L5-S1 OLIF with NSGY 4/12 c/b intraoperative left ureteral injury and underwent ureteroureteral anastomoses and ureteral stent placement. She was discharged with a correa and MADHURI drain that was removed on 4/16. She was seen by urology and anticipated stent removal in 2-3 months (6/2019).  She presents to ED with AMS and E.coli septicemia found to have air and fluid collection of left anterior prevertebral soft tissue with left ureter involvement. She underwent ex-lap and washout on 4/21. We are consulted for abx recommendations.      Per op note,  There were pocket of purulence noted and evacuated, sent for culture of left retroperitoneum to pole of left kidney. The stent was visible. Posterior to the stent there was a pocket of purulent fluid and exposed hardware along the spinal vertebrae. The tissue along the distal and proximal end of ureter were friable and unhealthy. She underwent left nephrostomy tube placement. The stent was removed and several metal clips were placed on proximal end of the ureteral.     Patient has developed leukocytosis.  OR cultures with Staph Lugdenensis - not currently covered.  Stable and non septic - no obvious source.  Patient does appear to be having diarrhea - C diff negative but suspect may need restesting if WBC increases and no other source found.  QTc long at 480 - considered change to Cipro considered but will add vanc 1st to see if respond to covering staph lugdenensis.  CBC shows no eosinophilia to suggest drug reaction and no rash.    WBC improving as well as fever curve. Stable non septic.  Repeat BCXs sent.  BAL done but no WBC no organisms seen.  Source of fever and leukocytosis unclear but ABD suspected.    Recommendations:  1. Continue ceftriaxone [id number] and rifampin 300 mg q12hr IV (transition to oral once able) given hardware in place.   2. Continue ancomycin 15mg/kg IV q12 with trough goal 15-24  3. Rec line changes jonathan  cvc - removed   4. If no improvement will rec CT ABD/Pelvis and repeat C diff as appears to have loose stool  5. Anticipate 6 weeks of IV abx from first negative blood cultures (4/23-6/4/19)  followed by oral abx suppression given retained hardware.   6. Discussed with ID staff

## 2019-04-30 NOTE — ASSESSMENT & PLAN NOTE
Mariann Huff is a 59 y.o. female s/p left ureteral injury on 4/12, who presented with fever, AMS, and intraabdominal abscess, taken for washout and ligation of left ureter with nephrostomy tube placement on 4/21. Difficulty weaning from vent, despite diuresis and weaning exercises. Possibly may require tracheostomy in the coming days.      Neuro:   - Sedated with precedex gtt.   - Pain control with fentanyl prn      Pulmonary:   - Intubated and sedated on precedex gtt.    - Daily CXR, improved from yesterday          - Lasix 40 IV BID  - ABGs prn   - Will continue to wean, cont SBTs  - Robinol for high secretions     Cardiac:   - HDS at this time. Pt has been weaned off pressors.   - TTE 4/22, EF 55%     Renal:    - S/p transection of left ureter 4/12 and subsequent ligation and PCT nephrostomy tube placement with IR 4/21  - Renal function improving. BUN 30 and Cr 0.8 today.    - UOP 3 L total, net -1.3 L  - Monitor I/Os    Intake/Output Summary (Last 24 hours) at 4/30/2019 1222  Last data filed at 4/30/2019 1200  Gross per 24 hour   Intake 2514.6 ml   Output 5075 ml   Net -2560.4 ml        ID:   - Blood cultures 4/12 +E. Coli; sensitivities pending. Repeat Bld Cx show NGTD.   - UCx from 4/20 growing E. Coli; sensitivities are now back. Repeat Cx pending.   - ID following for assistance with abx therapy, added Vanc per their recs  - Low grade fevers controlled with Tylenol, White count trending down.   - F/u BCx and MiniABL    Hem/Onc:   - H/H stable at this time; cont to monitor     Endocrine:  - DM Type 2 at baseline    - -200s, will increase to high dose SSI       Fluids/Electrolytes/Nutrition/GI:   # Shock Liver          - Resolved           - Labs continue to show improvement          - Elevated LFTs now down trending; AST 47; ALT 84          - Thrombocytopenia; plts count improving; [id number]           - INR normalized     # Lactic Acidosis          - Now resolved    # Diarrhea          - Pt with  multiple episodes of loose watery stool resembling urine noted from rectal tube; 400 ml watery stool overnight          - C.Diff panel Negative          - MADHURI drain also with yellow fluid resembling urine.  Sample sent for Cr analysis, 1.7.      - Maintain TF @ 40 ml/hr  - Start free water flushes 300 cc q6h  - Replace electrolytes PRN        PPx:  - DVT: Lovenox        DISPO:  - Continue SICU care        Zoë Du MD CA-1

## 2019-04-30 NOTE — PROGRESS NOTES
Pharmacokinetic Initial Assessment: IV Vancomycin    Assessment/Plan:  Initiate intravenous vancomycin with loading dose of 2000 mg once followed by a maintenance dose of vancomycin 1250mg IV every 12 hours  Desired empiric serum trough concentration is 15 to 24 mcg/mL per ID.  Draw vancomycin trough level 30 min prior to fourth dose on 5/2 at approximately 0800  Pharmacy will continue to follow and monitor vancomycin.      Please contact pharmacy at extension [phone] with any questions regarding this assessment.     Thank you for the consult,   Kory Haney, PharmD, [facility]  Medical/Surgical ICU Clinical Pharmacist  Spectralink: [phone]     Patient brief summary:  Mariann Huff is a 59 y.o. female initiated on antimicrobial therapy with IV Vancomycin for treatment of suspected bone/joint    Drug Allergies:   Review of patient's allergies indicates:  No Known Allergies    Actual Body Weight:   64.4 kg    Renal Function:   Estimated Creatinine Clearance: 92.1 mL/min (based on SCr of 0.7 mg/dL).,     Dialysis Method (if applicable):  N/A    CBC (last 72 hours):  Recent Labs   Lab Result Units 04/28/19  0330 04/29/19  0300 04/30/19  0300   WBC K/uL 15.89* 15.91* 13.66*   Hemoglobin g/dL 8.6* 8.7* 8.6*   Hematocrit % 28.4* 27.4* 28.3*   Platelets K/uL 189 215 274   Gran% % 76.7* 79.3* 81.6*   Lymph% % 10.0* 11.4* 9.5*   Mono% % 5.6 5.1 5.9   Eosinophil% % 2.6 2.1 2.0   Basophil% % 0.3 0.2 0.2   Differential Method  Automated Automated Automated       Metabolic Panel (last 72 hours):  Recent Labs   Lab Result Units 04/28/19  0330 04/29/19  0300 04/30/19  0300   Sodium mmol/L 145 142 141   Potassium mmol/L 4.3 3.7 3.9   Chloride mmol/L 109 105 102   CO2 mmol/L 28 29 29   Glucose mg/dL 174* 156* 185*   BUN, Bld mg/dL 30* 31* 28*   Creatinine mg/dL 0.8 0.8 0.7   Albumin g/dL 1.5* 1.5* 1.5*   Total Bilirubin mg/dL 3.4* 2.5* 1.7*   Alkaline Phosphatase U/L 209* 196* 198*   AST U/L 47* 64* 61*   ALT U/L 84* 73* 64*   Magnesium  mg/dL 1.8 1.6 1.6   Phosphorus mg/dL 3.4 2.9 2.7       Drug levels (last 3 results):  No results for input(s): VANCOMYCINRA, VANCOMYCINPE, VANCOMYCINTR in the last 72 hours.    Microbiologic Results:  Microbiology Results (last 7 days)     Procedure Component Value Units Date/Time    Blood culture [[id number]] Collected:  04/30/19 0230    Order Status:  Sent Specimen:  Blood from Peripheral, Hand, Right Updated:  04/30/19 0315    Blood culture [[id number]] Collected:  04/30/19 0247    Order Status:  Sent Specimen:  Blood from Peripheral, Wrist, Right Updated:  04/30/19 0315    Culture, VAP (BAL) [[id number]]     Order Status:  No result Specimen:  Bronchial Alveolar Lavage     Blood culture [[id number]] Collected:  04/24/19 0950    Order Status:  Completed Specimen:  Blood from Peripheral, Wrist, Right Updated:  04/29/19 1412     Blood Culture, Routine No growth after 5 days.    Aerobic culture [[id number]]  (Susceptibility) Collected:  04/21/19 0125    Order Status:  Completed Specimen:  Body Fluid from Abdomen Updated:  04/29/19 0951     Aerobic Bacterial Culture --     STAPHYLOCOCCUS LUGDUNENSIS  Rare      Narrative:       Retroperitoneal fluid    Blood culture [[id number]] Collected:  04/23/19 0430    Order Status:  Completed Specimen:  Blood from Peripheral, Ankle, Right Updated:  04/28/19 0822     Blood Culture, Routine No growth after 5 days.    Narrative:       Please collect with AM labs    Blood culture [[id number]] Collected:  04/23/19 0500    Order Status:  Completed Specimen:  Blood from Peripheral, Antecubital, Right Updated:  04/28/19 0822     Blood Culture, Routine No growth after 5 days.    Narrative:       Please collect with AM labs    Urine culture [[id number]] Collected:  04/25/19 0252    Order Status:  Completed Specimen:  Urine Updated:  04/26/19 1229     Urine Culture, Routine No growth    Narrative:       Preferred Collection Type->Urine, Clean Catch    Culture, Anaerobe [[id number]] Collected:   04/21/19 0125    Order Status:  Completed Specimen:  Body Fluid from Abdomen Updated:  04/25/19 1010     Anaerobic Culture No anaerobes isolated    Narrative:       Retroperitoneal fluid    Blood culture [[id number]] Collected:  04/21/19 1835    Order Status:  Completed Specimen:  Blood from Peripheral, Hand, Left Updated:  04/24/19 0902     Blood Culture, Routine Gram stain aer bottle: Gram negative rods     Blood Culture, Routine Results called to and read back by: Carmenza Wallace RN 04/22/2019  10:14     Blood Culture, Routine --     ESCHERICHIA COLI  For susceptibility see order #[id number]      Blood culture x two cultures. Draw prior to antibiotics. [[id number]] Collected:  04/20/19 1711    Order Status:  Completed Specimen:  Blood from Peripheral, Antecubital, Right Updated:  04/23/19 0910     Blood Culture, Routine Gram stain aer bottle: Gram negative rods     Blood Culture, Routine Gram stain flaco bottle: Gram negative rods      Blood Culture, Routine Results called to and read back by: Pancho Mars RN  04/21/2019  03:18     Blood Culture, Routine --     ESCHERICHIA COLI  For susceptibility see order #[id number]      Narrative:       Aerobic and anaerobic    Blood culture x two cultures. Draw prior to antibiotics. [[id number]]  (Susceptibility) Collected:  04/20/19 1705    Order Status:  Completed Specimen:  Blood from Peripheral, Antecubital, Right Updated:  04/23/19 0910     Blood Culture, Routine Gram stain aer bottle: Gram negative rods     Blood Culture, Routine Gram stain flaco bottle: Gram negative rods      Blood Culture, Routine Positive results previously called 04/21/2019  06:36     Blood Culture, Routine ESCHERICHIA COLI    Narrative:       Aerobic and anaerobic

## 2019-04-30 NOTE — SUBJECTIVE & OBJECTIVE
Interval History: No AEON.  Tmax 100.2, T current 97.3. WBC down from 15 to 13 today.  The patient denies any recent fever, chills, or sweats.      Review of Systems   Reason unable to perform ROS: limited as intubated but answering with head nod.   Constitutional: Negative for activity change, chills, diaphoresis and fever.   Respiratory: Negative for cough, shortness of breath and wheezing.    Cardiovascular: Negative for chest pain.   Gastrointestinal: Negative for abdominal pain, constipation, diarrhea, nausea and vomiting.   Genitourinary: Negative for dysuria, frequency and urgency.   Neurological: Negative for dizziness.   Hematological: Does not bruise/bleed easily.     Objective:     Vital Signs (Most Recent):  Temp: 97.3 °F (36.3 °C) (04/30/19 0906)  Pulse: 93 (04/30/19 0906)  Resp: 16 (04/29/19 0335)  BP: (!) 161/70 (04/30/19 0900)  SpO2: 95 % (04/30/19 0906) Vital Signs (24h Range):  Temp:  [97.2 °F (36.2 °C)-100.5 °F (38.1 °C)] 97.3 °F (36.3 °C)  Pulse:  [] 93  SpO2:  [94 %-100 %] 95 %  BP: (138-220)/() 161/70     Weight: 84.4 kg (186 lb 1.1 oz)  Body mass index is 31.94 kg/m².    Estimated Creatinine Clearance: 92.1 mL/min (based on SCr of 0.7 mg/dL).    Physical Exam   Constitutional: She is oriented to person, place, and time. She appears well-developed and well-nourished. No distress.       HENT:   Head: Normocephalic and atraumatic.   Cardiovascular: Normal rate, regular rhythm and normal heart sounds. Exam reveals no gallop and no friction rub.   No murmur heard.  Pulmonary/Chest: Effort normal and breath sounds normal. No respiratory distress. She has no wheezes. She has no rales.   Abdominal: Soft. Bowel sounds are normal. She exhibits no distension and no mass. There is no tenderness. There is no rebound and no guarding.   Musculoskeletal: She exhibits no edema.   Neurological: She is alert and oriented to person, place, and time.   Skin: Skin is warm and dry. She is not  diaphoretic.   Psychiatric: She has a normal mood and affect. Her behavior is normal.       Significant Labs:   Blood Culture:   Recent Labs   Lab 04/20/19  1711 04/21/19  1835 04/23/19  0430 04/23/19  0500 04/24/19  0950   LABBLOO Gram stain aer bottle: Gram negative rods  Gram stain flaco bottle: Gram negative rods   Results called to and read back by: Pancho Mars RN  04/21/2019  03:18  ESCHERICHIA COLI  For susceptibility see order #[id number]   Gram stain aer bottle: Gram negative rods  Results called to and read back by: Carmenza Wallace RN 04/22/2019  10:14  ESCHERICHIA COLI  For susceptibility see order #[id number]   No growth after 5 days. No growth after 5 days. No growth after 5 days.     CBC:   Recent Labs   Lab 04/29/19  0300 04/30/19  0300   WBC 15.91* 13.66*   HGB 8.7* 8.6*   HCT 27.4* 28.3*    274     CMP:   Recent Labs   Lab 04/29/19  0300 04/30/19  0300    141   K 3.7 3.9    102   CO2 29 29   * 185*   BUN 31* 28*   CREATININE 0.8 0.7   CALCIUM 9.1 9.1   PROT 6.2 6.5   ALBUMIN 1.5* 1.5*   BILITOT 2.5* 1.7*   ALKPHOS 196* 198*   AST 64* 61*   ALT 73* 64*   ANIONGAP 8 10   EGFRNONAA >60.0 >60.0     Respiratory Culture: No results for input(s): GSRESP, RESPIRATORYC in the last 4320 hours.  Urine Culture:   Recent Labs   Lab 04/20/19  1711 04/21/19  0640 04/21/19  2256 04/25/19  0252   LABURIN ESCHERICHIA COLI  >100,000 cfu/ml   No growth No significant growth No growth     Urine Studies:   Recent Labs   Lab 04/25/19  0252   COLORU Argelia   APPEARANCEUA Cloudy*   PHUR 5.0   SPECGRAV 1.020   PROTEINUA 1+*   GLUCUA 3+*   KETONESU 1+*   BILIRUBINUA Negative   OCCULTUA 3+*   NITRITE Negative   LEUKOCYTESUR 3+*   RBCUA 25*   WBCUA 55*   BACTERIA Few*   SQUAMEPITHEL 1   HYALINECASTS 0     Wound Culture:   Recent Labs   Lab 04/21/19  0125   LABAERO STAPHYLOCOCCUS LUGDUNENSIS  Rare       All pertinent labs within the past 24 hours have been reviewed.    Significant Imaging: I have  reviewed all pertinent imaging results/findings within the past 24 hours.   X-Ray Chest 1 View [[id number]] Resulted: 04/30/19 0945   Order Status: Completed Updated: 04/30/19 0947   Narrative:     EXAMINATION:  XR CHEST 1 VIEW    CLINICAL HISTORY:  difficuly weaning the vent, diuresing daily;    TECHNIQUE:  Single frontal view of the chest was performed.    COMPARISON:  04/29/2019    FINDINGS:  There is a right neck CVP line with tip projected over SVC.  Endotracheal tube is seen with tip above the level of the barb.  NG tube is seen coursing towards the stomach.  Trachea is patent.  Cardiac silhouette appears unchanged.  There is atherosclerosis.  Lung volumes are symmetric.  Increased pulmonary haziness is suggestive of mild pulmonary edema.  No effusion, pneumothorax or free air below the diaphragm.  No acute osseous abnormality.   Impression:       Findings suggestive of mild pulmonary edema which appears improved from prior.      Electronically signed by: David Lo MD  Date: 04/30/2019  Time: 09:45   X-Ray Chest 1 View [[id number]] Resulted: 04/29/19 0750   Order Status: Completed Updated: 04/29/19 0753   Narrative:     EXAMINATION:  XR CHEST 1 VIEW    CLINICAL HISTORY:  intubated;    FINDINGS:  Tubes and lines a good position.  Heart size is normal there is moderate edema and no change.   Impression:       See above    Pulmonary edema pneumonia aspiration or sepsis.      Electronically signed by: Mart Norris MD  Date: 04/29/2019  Time: 07:50   X-Ray Chest 1 View [[id number]] Resulted: 04/28/19 0932   Order Status: Completed Updated: 04/28/19 0935   Narrative:     EXAMINATION:  XR CHEST 1 VIEW    CLINICAL HISTORY:  intubated;.    TECHNIQUE:  Single frontal portable view of the chest was performed.    COMPARISON:  April 27, 2019 at 06:29    FINDINGS:  Support devices: ET tube, NG tube, and right internal jugular central venous catheter remain.    There has not been any detrimental change since April 27,  2019 at 06:29.   Impression:       No detrimental change.      Electronically signed by: Sarah Mora MD  Date: 04/28/2019  Time: 09:32   X-Ray Chest 1 View [[id number]] Resulted: 04/27/19 1350   Order Status: Completed Updated: 04/27/19 1352   Narrative:     EXAMINATION:  XR CHEST 1 VIEW    CLINICAL HISTORY:  intubated;.    TECHNIQUE:  Single frontal portable view of the chest was performed.    COMPARISON:  April 26, 2019 at 05:54    FINDINGS:  Support devices: ET tube, NG tube, and right internal jugular central venous catheter remain.    There has not been any detrimental change since April 26, 2019 at 05:54.   Impression:       No detrimental change.      Electronically signed by: Sarah Mora MD  Date: 04/27/2019  Time: 13:50   X-Ray Chest 1 View [[id number]] Resulted: 04/26/19 0741   Order Status: Completed Updated: 04/26/19 0744   Narrative:     EXAMINATION:  XR CHEST 1 VIEW    CLINICAL HISTORY:  Intubated. Eval pneumonia.;    COMPARISON:  Comparison is made to 04/25/2019.    FINDINGS:  Endotracheal tube tip lies 1.5 cm above the level of the barb.  There has been partial interval clearing of airspace consolidation on both sides since 04/25/2019, with no significant detrimental interval change in the appearance of chest since that time observed.  No pneumothorax.   Impression:       As above      Electronically signed by: Christian Moreno MD  Date: 04/26/2019  Time: 07:41   X-Ray Chest 1 View [[id number]] Resulted: 04/25/19 0656   Order Status: Completed Updated: 04/25/19 0659   Narrative:     EXAMINATION:  XR CHEST 1 VIEW    CLINICAL HISTORY:  Intubated. Eval pneumonia.;    COMPARISON:  Comparison is made to 04/24/2019 and 04/21/2019.    FINDINGS:  Endotracheal tube tip lies 1.5 cm above the barb.  Distal aspect of the enteric tube lies distal to the GE junction, the tube tip projected inferior to the imaged volume.  Right jugular origin vascular catheter is in the superior vena cava.  Heart size and the  appearance of the cardiomediastinal silhouette have not changed appreciably since the examinations referenced above.  Pulmonary parenchymal opacity is again noted bilaterally consistent with widespread airspace consolidation.  This appears more pronounced on the current examination than on 04/24/2019, with progressive worsening since 04/21/2019.  Some of the opacity in the inferior hemithoraces on each side may reflect associated pleural fluid.  No pneumothorax.   Impression:       Worsening bilateral airspace consolidation.      Electronically signed by: Christian Moreno MD  Date: 04/25/2019  Time: 06:56   X-Ray Chest 1 View [[id number]] Resulted: 04/24/19 1136   Order Status: Completed Updated: 04/24/19 1138   Narrative:     EXAMINATION:  XR CHEST 1 VIEW    CLINICAL HISTORY:  Intubated. Eval pneumonia.;    FINDINGS:  ET tip T5 central line mid SVC NG tip fundus.  There is cardiomegaly aortic plaque moderate-severe edema pleural fluid and slight worsening.   Impression:       See above    Pulmonary edema pneumonia aspiration or sepsis.      Electronically signed by: Mart Norris MD  Date: 04/24/2019  Time: 11:36

## 2019-04-30 NOTE — PROGRESS NOTES
Subjective/Objective  Interval History/Significant Events: Febrile to 100.5 overnight. Doing well on PAV+ 35% overnight.     Follow-up For: Procedure(s) (LRB):  Creation, Nephrostomy, Percutaneous (Left)    Post-Operative Day: 8 Days Post-Op    Objective:     Vital Signs (Most Recent):  Temp: 99.1 °F (37.3 °C) (04/30/19 1100)  Pulse: 83 (04/30/19 1200)  Resp: 16 (04/29/19 0335)  BP: (!) 169/77 (04/30/19 1145)  SpO2: 98 % (04/30/19 1200) Vital Signs (24h Range):  Temp:  [97.2 °F (36.2 °C)-100.5 °F (38.1 °C)] 99.1 °F (37.3 °C)  Pulse:  [] 83  SpO2:  [93 %-100 %] 98 %  BP: (138-220)/() 169/77     Weight: 84.4 kg (186 lb 1.1 oz)  Body mass index is 31.94 kg/m².      Intake/Output Summary (Last 24 hours) at 4/30/2019 1217  Last data filed at 4/30/2019 1200  Gross per 24 hour   Intake 2514.6 ml   Output 5075 ml   Net -2560.4 ml       Physical Exam   Constitutional: She appears well-developed and well-nourished.   HENT:   Head: Normocephalic and atraumatic.   Intubated   Eyes: Right eye exhibits no discharge. Left eye exhibits no discharge.   Neck: Normal range of motion. Neck supple.   Cardiovascular: Normal rate and regular rhythm.   Pulmonary/Chest: Effort normal. No respiratory distress.   Intubated, secretions suctioned.    Abdominal:   Midline incision c/d/i.  MADHURI with scant serous drainage.  Abdomen soft, non-distended. Rectal tube with watery brown output.     Genitourinary:   Genitourinary Comments: Nephrostomy tube in place with watery dark yellow output.  Fontenot catheter+   Musculoskeletal: Normal range of motion.   Neurological:   Able to follow commands, denying pain. Lightly sedated on precedex gtt   Skin: Skin is warm and dry.   Vitals reviewed.    Vents:  Vent Mode: Spont (04/30/19 1059)  Set Rate: 16 bmp (04/29/19 0911)  Vt Set: 400 mL (04/29/19 0911)  Pressure Support: 0 cmH20 (04/29/19 1014)  PEEP/CPAP: 5 cmH20 (04/30/19 1059)  Oxygen Concentration (%): 49 (04/30/19 1200)  Peak Airway Pressure:  14 cmH2O (04/30/19 1059)  Plateau Pressure: 35 cmH20 (04/30/19 1059)  Total Ve: 6.1 mL (04/30/19 1059)  Negative Inspiratory Force (cm H2O): -18 (04/27/19 1306)  F/VT Ratio<105 (RSBI): (!) 41.78 (04/29/19 0335)    Lines/Drains/Airways     Central Venous Catheter Line                 Percutaneous Central Line Insertion/Assessment - triple lumen  04/21/19 0100 right internal jugular 9 days          Drain                 Closed/Suction Drain 04/21/19 0300 LLQ 9 days         NG/OG Tube 04/21/19 0728 Center mouth 9 days         Nephrostomy 04/21/19 0629 Left 8 Fr. 9 days         Urethral Catheter 04/20/19 1711 Latex 16 Fr. 9 days         Rectal Tube 04/21/19 2100 rectal tube w/ balloon (indicate number of mLs) 8 days          Airway                 Airway - Non-Surgical 04/21/19 0029 Endotracheal Tube 9 days                Significant Labs:    ABG:  None today    CBC/Anemia Profile:  Recent Labs   Lab 04/29/19  0300 04/30/19  0300   WBC 15.91* 13.66*   HGB 8.7* 8.6*   HCT 27.4* 28.3*    274   MCV 88 90   RDW 14.8* 14.4        Chemistries:  Recent Labs   Lab 04/29/19  0300 04/30/19  0300    141   K 3.7 3.9    102   CO2 29 29   BUN 31* 28*   CREATININE 0.8 0.7   CALCIUM 9.1 9.1   ALBUMIN 1.5* 1.5*   PROT 6.2 6.5   BILITOT 2.5* 1.7*   ALKPHOS 196* 198*   ALT 73* 64*   AST 64* 61*   MG 1.6 1.6   PHOS 2.9 2.7       Significant Imaging:  I have reviewed all pertinent imaging results/findings within the past 24 hours.      Scheduled Meds:   cefTRIAXone (ROCEPHIN) IVPB  2 g Intravenous Q24H    chlorhexidine  15 mL Mouth/Throat BID    enoxaparin  40 mg Subcutaneous Daily    furosemide  40 mg Intravenous BID    pantoprazole  40 mg Intravenous Daily    rifAMpin (RIFADIN) IVPB  300 mg Intravenous Q12H    vancomycin (VANCOCIN) IVPB  1,250 mg Intravenous Q12H     Continuous Infusions:   dexmedetomidine (PRECEDEX) infusion 1.4 mcg/kg/hr (04/30/19 1200)     PRN Meds:acetaminophen, albuterol-ipratropium,  calcium gluconate IVPB, calcium gluconate IVPB, calcium gluconate IVPB, dextrose 50%, dextrose 50%, [] fentaNYL **FOLLOWED BY** fentaNYL, glucagon (human recombinant), glucose, glucose, glycopyrrolate, hydrALAZINE, insulin aspart U-100, magnesium sulfate IVPB, magnesium sulfate IVPB, nitroGLYCERIN, ondansetron, potassium chloride in water **AND** potassium chloride in water **AND** potassium chloride in water, promethazine (PHENERGAN) IVPB, sodium chloride 0.9%    Vital signs in last 24 hours:  Temp:  [97.2 °F (36.2 °C)-100.5 °F (38.1 °C)] 99.1 °F (37.3 °C)  Pulse:  [] 83  SpO2:  [93 %-100 %] 98 %  BP: (138-220)/() 169/77    Intake/Output last 3 shifts:  I/O last 3 completed shifts:  In: 4275.4 [I.V.:1175.4; NG/GT:3100]  Out: 5420 [Urine:4690; Drains:105; Stool:625]  Intake/Output this shift:  I/O this shift:  In: 380 [NG/GT:380]  Out: 1765 [Urine:1725; Drains:15; Stool:25]    Problem Assessment/Plan  Cardiac/Vascular  CAD (coronary artery disease)  Assessment & Plan  Mariann CROW Refugio is a 59 y.o. female s/p left ureteral injury on , who presented with fever, AMS, and intraabdominal abscess, taken for washout and ligation of left ureter with nephrostomy tube placement on . Difficulty weaning from vent, despite diuresis and weaning exercises. Possibly may require tracheostomy in the coming days.      Neuro:   - Sedated with precedex gtt.   - Pain control with fentanyl prn      Pulmonary:   - Intubated and sedated on precedex gtt.    - Daily CXR, improved from yesterday          - Lasix 40 IV BID  - ABGs prn   - Will continue to wean, cont SBTs  - Robinol for high secretions     Cardiac:   - HDS at this time. Pt has been weaned off pressors.   - TTE , EF 55%     Renal:    - S/p transection of left ureter  and subsequent ligation and PCT nephrostomy tube placement with IR   - Renal function improving. BUN 30 and Cr 0.8 today.    - UOP 3 L total, net -1.3 L  - Monitor  I/Os    Intake/Output Summary (Last 24 hours) at 4/30/2019 1222  Last data filed at 4/30/2019 1200  Gross per 24 hour   Intake 2514.6 ml   Output 5075 ml   Net -2560.4 ml        ID:   - Blood cultures 4/12 +E. Coli; sensitivities pending. Repeat Bld Cx show NGTD.   - UCx from 4/20 growing E. Coli; sensitivities are now back. Repeat Cx pending.   - ID following for assistance with abx therapy, added Vanc per their recs  - Low grade fevers controlled with Tylenol, White count trending down.   - F/u BCx and MiniABL    Hem/Onc:   - H/H stable at this time; cont to monitor     Endocrine:  - DM Type 2 at baseline    - -200s, will increase to high dose SSI       Fluids/Electrolytes/Nutrition/GI:   # Shock Liver          - Resolved           - Labs continue to show improvement          - Elevated LFTs now down trending; AST 47; ALT 84          - Thrombocytopenia; plts count improving; [id number]           - INR normalized     # Lactic Acidosis          - Now resolved    # Diarrhea          - Pt with multiple episodes of loose watery stool resembling urine noted from rectal tube; 400 ml watery stool overnight          - C.Diff panel Negative          - MADHURI drain also with yellow fluid resembling urine.  Sample sent for Cr analysis, 1.7.      - Maintain TF @ 40 ml/hr  - Start free water flushes 300 cc q6h  - Replace electrolytes PRN        PPx:  - DVT: Lovenox        DISPO:  - Continue SICU care        Zoë Du MD CA-1

## 2019-04-30 NOTE — PROGRESS NOTES
NEUROSURGICAL POST-OPERATIVE PROGRESS NOTE    DATE OF SERVICE:  04/30/2019      ATTENDING PHYSICIAN:  Mk George MD    SUBJECTIVE:    INTERIM HISTORY:    This is a very pleasant 59 y.o. y.o. female, who is status post L5-S1 OLIF, ureter injury repair 18 days ago. Post-operative course complicated by E coli sepsis. Day 9 emergent abdominal surgery and ligature left ureter during sepsis episode.     Intubated due to respiratory failure.    Awake, follows commands. Answers by nodding head.   and son at bedside.  called me today to discuss recommendation of trach and peg due to prolonged intubation.               OBJECTIVE:    PHYSICAL EXAMINATION:   Vitals:    04/14/19 0013   BP: 114/65   Pulse: 97   Resp: 18   Temp: 99.9 °F (37.7 °C)       Neurosurgery Physical Exam    Ortho Exam    Neurologic Exam        DIAGNOSTIC DATA:    NA    ASSESMENT:    This is a 59 y.o. female who    Problem List Items Addressed This Visit        Neuro    Lumbar radiculopathy - Primary    Current Assessment & Plan     Patient with history of R L5 radiculopathy and severe back pain with neurogenic claudication. Now s/p elective ALIF (4/13) with ureteral injury intraop and primary repair.      --POD 1; admitted to floor on telemetry  --Pending L Spine x rays  --Brace when out of bed (at bedside)  --PT/OT/OOB today  --H+H stable post operatively  --Per urology, maintain correa catheter at all times, do not remove  --MADHURI drain at surgical site with minimal output  --Continue ancef 2g q8 hours             Lumbosacral radiculopathy at L5       Orthopedic    Cervical arthritis (Chronic)        Seems to recover well from urosepsis    18 days s/p L5-S1 OLIF    Day 9 ligature left ureter/nephrostomy    Respiratory failure    PLAN:    I discussed with the  and explained to him why it will be so important to proceed with the trachesostomy and peg in order to wean mechanical ventilation and facilitate mobilization.    Patient is  nodding when ask if she would like to receive the proposed treatments.    All questions answered        Mk George MD  Pager: [phone]

## 2019-04-30 NOTE — PROGRESS NOTES
[facility]  Infectious Disease  Progress Note    Patient Name: Mariann Huff  MRN: [id number]  Admission Date: 4/20/2019  Length of Stay: 10 days  Attending Physician: Paul Carlson MD  Primary Care Provider: Jasbir Haney MD    Isolation Status: No active isolations  Assessment/Plan:      Sepsis  58 y/o female with HTN, DMII, CAD, NSTEMI, s/p L5-S1 OLIF with NSGY 4/12 c/b intraoperative left ureteral injury and underwent ureteroureteral anastomoses and ureteral stent placement. She was discharged with a correa and MADHURI drain that was removed on 4/16. She was seen by urology and anticipated stent removal in 2-3 months (6/2019).  She presents to ED with AMS and E.coli septicemia found to have air and fluid collection of left anterior prevertebral soft tissue with left ureter involvement. She underwent ex-lap and washout on 4/21. We are consulted for abx recommendations.      Per op note,  There were pocket of purulence noted and evacuated, sent for culture of left retroperitoneum to pole of left kidney. The stent was visible. Posterior to the stent there was a pocket of purulent fluid and exposed hardware along the spinal vertebrae. The tissue along the distal and proximal end of ureter were friable and unhealthy. She underwent left nephrostomy tube placement. The stent was removed and several metal clips were placed on proximal end of the ureteral.     Patient has developed leukocytosis.  OR cultures with Staph Lugdenensis - not currently covered.  Stable and non septic - no obvious source.  Patient does appear to be having diarrhea - C diff negative but suspect may need restesting if WBC increases and no other source found.  QTc long at 480 - considered change to Cipro considered but will add vanc 1st to see if respond to covering staph lugdenensis.  CBC shows no eosinophilia to suggest drug reaction and no rash.    WBC improving as well as fever curve. Stable non septic.  Repeat BCXs sent.  BAL  done but no WBC no organisms seen.  Source of fever and leukocytosis unclear but ABD suspected.    Recommendations:  1. Continue ceftriaxone 6oz56bp and rifampin 300 mg q12hr IV (transition to oral once able) given hardware in place.   2. Continue ancomycin 15mg/kg IV q12 with trough goal 15-24  3. Rec line changes jonathan cvc - removed   4. If no improvement will rec CT ABD/Pelvis and repeat C diff as appears to have loose stool  5. Anticipate 6 weeks of IV abx from first negative blood cultures (4/23-6/4/19)  followed by oral abx suppression given retained hardware.   6. Discussed with ID staff              Anticipated Disposition: tbd    Thank you for your consult. I will follow-up with patient. Please contact us if you have any additional questions.    POLLY Jerome  Infectious Disease  [facility]    Subjective:     Principal Problem:Ureteral transection of left native kidney    HPI: 58 y/o female with HTN, DMII, CAD, NSTEMI, s/p L5-S1 OLIF with NSGY 4/12 sustained intraoperative laceration and underwent ureteroureteral anastomoses and ureteral stent placement. She was discharged with a correa and MADHURI drain that was removed on 4/16. She was seen by urology and anticipated stent removal in 2-3 months (6/2019).  She presents to ED with AMS and sepsis. She was febrile 103 in ED. WBC 5K on admit. Lactate 4.6. Cath UA with 3+leukocytes, >100 WBcs and many bacteria.     MRI: Postsurgical change of L5-S1 posterior spinal fusion and anterior interbody fusion with a 4.4 cm fluid fluid collection in the left anterior prevertebral soft tissues at the level of the L5-S1 disc space.  Findings may represent postoperative seroma or evolving hematoma however, urinoma not excluded.  No definite abscess although correlation is advised.    Irregular flow void involving the right common iliac vein, underlying thrombus not excluded.      CT: air collection within the anterior paraspinous soft tissues through  which the left ureter courses. Given that the ureter courses through this, communication of the ureter with this collection is not excluded.  There is a ureteral stent within the left ureter.Punctate foci of air in L5-S1.  2. Air within the left renal collecting system, along the left ureter, and within the urinary bladder, correlation advised.    This was not amendable for drainage by IR so she was taken to OR for washout.     She underwent ex-lap of left ureter and left nephrostomy tube placement 4/21/19.   Per op note,  There were pocket of purulence noted and evacuated, sent for culture of left retroperitoneum to pole of left kidney. The stent was visible. Posterior to the stent there was a pocket of purulent fluid and exposed hardware along the spinal vertebrae. The tissue along the distal and proximal end of ureter were friable and unhealthy. She underwent left nephrostomy tube placement. The stent was removed and several metal clips were placed on proximal end of the ureteral. MADHURI drain was placed into left retroperitoneal.     Blood cultures were are positive for E coli..   Urine cultures +E.coli  OR cultures were positive for Staph Ludenensis  She is was treated with zosyn. She is in the ICU, intubated, sedated    ID had seen the patient and there was concern for hardware involvement given proximity and the patient was placed on ceftriaxone and rifampin with plan for 6 weeks of abx.  ID now reconsulted due to fever and leukocytosis.  Patient denies any abdominal pain , fever, chills or sweats.  Repeat Bblood cultures and urine cultures are no growth.    Interval History: No AEON.  Tmax 100.2, T current 97.3. WBC down from 15 to 13 today.  The patient denies any recent fever, chills, or sweats.      Review of Systems   Reason unable to perform ROS: limited as intubated but answering with head nod.   Constitutional: Negative for activity change, chills, diaphoresis and fever.   Respiratory: Negative for cough,  shortness of breath and wheezing.    Cardiovascular: Negative for chest pain.   Gastrointestinal: Negative for abdominal pain, constipation, diarrhea, nausea and vomiting.   Genitourinary: Negative for dysuria, frequency and urgency.   Neurological: Negative for dizziness.   Hematological: Does not bruise/bleed easily.     Objective:     Vital Signs (Most Recent):  Temp: 97.3 °F (36.3 °C) (04/30/19 0906)  Pulse: 93 (04/30/19 0906)  Resp: 16 (04/29/19 0335)  BP: (!) 161/70 (04/30/19 0900)  SpO2: 95 % (04/30/19 0906) Vital Signs (24h Range):  Temp:  [97.2 °F (36.2 °C)-100.5 °F (38.1 °C)] 97.3 °F (36.3 °C)  Pulse:  [] 93  SpO2:  [94 %-100 %] 95 %  BP: (138-220)/() 161/70     Weight: 84.4 kg (186 lb 1.1 oz)  Body mass index is 31.94 kg/m².    Estimated Creatinine Clearance: 92.1 mL/min (based on SCr of 0.7 mg/dL).    Physical Exam   Constitutional: She is oriented to person, place, and time. She appears well-developed and well-nourished. No distress.       HENT:   Head: Normocephalic and atraumatic.   Cardiovascular: Normal rate, regular rhythm and normal heart sounds. Exam reveals no gallop and no friction rub.   No murmur heard.  Pulmonary/Chest: Effort normal and breath sounds normal. No respiratory distress. She has no wheezes. She has no rales.   Abdominal: Soft. Bowel sounds are normal. She exhibits no distension and no mass. There is no tenderness. There is no rebound and no guarding.   Musculoskeletal: She exhibits no edema.   Neurological: She is alert and oriented to person, place, and time.   Skin: Skin is warm and dry. She is not diaphoretic.   Psychiatric: She has a normal mood and affect. Her behavior is normal.       Significant Labs:   Blood Culture:   Recent Labs   Lab 04/20/19  1711 04/21/19  1835 04/23/19  0430 04/23/19  0500 04/24/19  0950   LABBLOO Gram stain aer bottle: Gram negative rods  Gram stain flaco bottle: Gram negative rods   Results called to and read back by: Pancho Mars  RN  04/21/2019  03:18  ESCHERICHIA COLI  For susceptibility see order #[id number]   Gram stain aer bottle: Gram negative rods  Results called to and read back by: Carmenza Wallace RN 04/22/2019  10:14  ESCHERICHIA COLI  For susceptibility see order #[id number]   No growth after 5 days. No growth after 5 days. No growth after 5 days.     CBC:   Recent Labs   Lab 04/29/19  0300 04/30/19  0300   WBC 15.91* 13.66*   HGB 8.7* 8.6*   HCT 27.4* 28.3*    274     CMP:   Recent Labs   Lab 04/29/19  0300 04/30/19  0300    141   K 3.7 3.9    102   CO2 29 29   * 185*   BUN 31* 28*   CREATININE 0.8 0.7   CALCIUM 9.1 9.1   PROT 6.2 6.5   ALBUMIN 1.5* 1.5*   BILITOT 2.5* 1.7*   ALKPHOS 196* 198*   AST 64* 61*   ALT 73* 64*   ANIONGAP 8 10   EGFRNONAA >60.0 >60.0     Respiratory Culture: No results for input(s): GSRESP, RESPIRATORYC in the last 4320 hours.  Urine Culture:   Recent Labs   Lab 04/20/19  1711 04/21/19  0640 04/21/19  2256 04/25/19  0252   LABURIN ESCHERICHIA COLI  >100,000 cfu/ml   No growth No significant growth No growth     Urine Studies:   Recent Labs   Lab 04/25/19  0252   COLORU Argelia   APPEARANCEUA Cloudy*   PHUR 5.0   SPECGRAV 1.020   PROTEINUA 1+*   GLUCUA 3+*   KETONESU 1+*   BILIRUBINUA Negative   OCCULTUA 3+*   NITRITE Negative   LEUKOCYTESUR 3+*   RBCUA 25*   WBCUA 55*   BACTERIA Few*   SQUAMEPITHEL 1   HYALINECASTS 0     Wound Culture:   Recent Labs   Lab 04/21/19  0125   LABAERO STAPHYLOCOCCUS LUGDUNENSIS  Rare       All pertinent labs within the past 24 hours have been reviewed.    Significant Imaging: I have reviewed all pertinent imaging results/findings within the past 24 hours.   X-Ray Chest 1 View [[id number]] Resulted: 04/30/19 0945   Order Status: Completed Updated: 04/30/19 0947   Narrative:     EXAMINATION:  XR CHEST 1 VIEW    CLINICAL HISTORY:  difficuly weaning the vent, diuresing daily;    TECHNIQUE:  Single frontal view of the chest was  performed.    COMPARISON:  04/29/2019    FINDINGS:  There is a right neck CVP line with tip projected over SVC.  Endotracheal tube is seen with tip above the level of the barb.  NG tube is seen coursing towards the stomach.  Trachea is patent.  Cardiac silhouette appears unchanged.  There is atherosclerosis.  Lung volumes are symmetric.  Increased pulmonary haziness is suggestive of mild pulmonary edema.  No effusion, pneumothorax or free air below the diaphragm.  No acute osseous abnormality.   Impression:       Findings suggestive of mild pulmonary edema which appears improved from prior.      Electronically signed by: David Lo MD  Date: 04/30/2019  Time: 09:45   X-Ray Chest 1 View [[id number]] Resulted: 04/29/19 0750   Order Status: Completed Updated: 04/29/19 0753   Narrative:     EXAMINATION:  XR CHEST 1 VIEW    CLINICAL HISTORY:  intubated;    FINDINGS:  Tubes and lines a good position.  Heart size is normal there is moderate edema and no change.   Impression:       See above    Pulmonary edema pneumonia aspiration or sepsis.      Electronically signed by: Mart Norris MD  Date: 04/29/2019  Time: 07:50   X-Ray Chest 1 View [[id number]] Resulted: 04/28/19 0932   Order Status: Completed Updated: 04/28/19 0935   Narrative:     EXAMINATION:  XR CHEST 1 VIEW    CLINICAL HISTORY:  intubated;.    TECHNIQUE:  Single frontal portable view of the chest was performed.    COMPARISON:  April 27, 2019 at 06:29    FINDINGS:  Support devices: ET tube, NG tube, and right internal jugular central venous catheter remain.    There has not been any detrimental change since April 27, 2019 at 06:29.   Impression:       No detrimental change.      Electronically signed by: Sarah Mora MD  Date: 04/28/2019  Time: 09:32   X-Ray Chest 1 View [[id number]] Resulted: 04/27/19 1350   Order Status: Completed Updated: 04/27/19 1352   Narrative:     EXAMINATION:  XR CHEST 1 VIEW    CLINICAL  HISTORY:  intubated;.    TECHNIQUE:  Single frontal portable view of the chest was performed.    COMPARISON:  April 26, 2019 at 05:54    FINDINGS:  Support devices: ET tube, NG tube, and right internal jugular central venous catheter remain.    There has not been any detrimental change since April 26, 2019 at 05:54.   Impression:       No detrimental change.      Electronically signed by: Sarah Mora MD  Date: 04/27/2019  Time: 13:50   X-Ray Chest 1 View [[id number]] Resulted: 04/26/19 0741   Order Status: Completed Updated: 04/26/19 0744   Narrative:     EXAMINATION:  XR CHEST 1 VIEW    CLINICAL HISTORY:  Intubated. Eval pneumonia.;    COMPARISON:  Comparison is made to 04/25/2019.    FINDINGS:  Endotracheal tube tip lies 1.5 cm above the level of the barb.  There has been partial interval clearing of airspace consolidation on both sides since 04/25/2019, with no significant detrimental interval change in the appearance of chest since that time observed.  No pneumothorax.   Impression:       As above      Electronically signed by: Christian Moreno MD  Date: 04/26/2019  Time: 07:41   X-Ray Chest 1 View [[id number]] Resulted: 04/25/19 0656   Order Status: Completed Updated: 04/25/19 0659   Narrative:     EXAMINATION:  XR CHEST 1 VIEW    CLINICAL HISTORY:  Intubated. Eval pneumonia.;    COMPARISON:  Comparison is made to 04/24/2019 and 04/21/2019.    FINDINGS:  Endotracheal tube tip lies 1.5 cm above the barb.  Distal aspect of the enteric tube lies distal to the GE junction, the tube tip projected inferior to the imaged volume.  Right jugular origin vascular catheter is in the superior vena cava.  Heart size and the appearance of the cardiomediastinal silhouette have not changed appreciably since the examinations referenced above.  Pulmonary parenchymal opacity is again noted bilaterally consistent with widespread airspace consolidation.  This appears more pronounced on the current examination than on 04/24/2019,  with progressive worsening since 04/21/2019.  Some of the opacity in the inferior hemithoraces on each side may reflect associated pleural fluid.  No pneumothorax.   Impression:       Worsening bilateral airspace consolidation.      Electronically signed by: Christian Moreno MD  Date: 04/25/2019  Time: 06:56   X-Ray Chest 1 View [[id number]] Resulted: 04/24/19 1136   Order Status: Completed Updated: 04/24/19 1138   Narrative:     EXAMINATION:  XR CHEST 1 VIEW    CLINICAL HISTORY:  Intubated. Eval pneumonia.;    FINDINGS:  ET tip T5 central line mid SVC NG tip fundus.  There is cardiomegaly aortic plaque moderate-severe edema pleural fluid and slight worsening.   Impression:       See above    Pulmonary edema pneumonia aspiration or sepsis.      Electronically signed by: Mart Norris MD  Date: 04/24/2019  Time: 11:36

## 2019-05-01 ENCOUNTER — ANESTHESIA EVENT (OUTPATIENT)
Dept: SURGERY | Facility: HOSPITAL | Age: 59
DRG: 003 | End: 2019-05-01
Payer: MEDICARE

## 2019-05-01 LAB
ALBUMIN SERPL BCP-MCNC: 1.8 G/DL (ref 3.5–5.2)
ALP SERPL-CCNC: 218 U/L (ref 55–135)
ALT SERPL W/O P-5'-P-CCNC: 56 U/L (ref 10–44)
ANION GAP SERPL CALC-SCNC: 12 MMOL/L (ref 8–16)
AST SERPL-CCNC: 45 U/L (ref 10–40)
BASOPHILS # BLD AUTO: 0.06 K/UL (ref 0–0.2)
BASOPHILS NFR BLD: 0.4 % (ref 0–1.9)
BILIRUB SERPL-MCNC: 2 MG/DL (ref 0.1–1)
BUN SERPL-MCNC: 26 MG/DL (ref 6–20)
C DIFF GDH STL QL: NEGATIVE
C DIFF TOX A+B STL QL IA: NEGATIVE
CALCIUM SERPL-MCNC: 9.8 MG/DL (ref 8.7–10.5)
CHLORIDE SERPL-SCNC: 99 MMOL/L (ref 95–110)
CO2 SERPL-SCNC: 27 MMOL/L (ref 23–29)
CREAT SERPL-MCNC: 0.7 MG/DL (ref 0.5–1.4)
DIFFERENTIAL METHOD: ABNORMAL
EOSINOPHIL # BLD AUTO: 0.3 K/UL (ref 0–0.5)
EOSINOPHIL NFR BLD: 1.7 % (ref 0–8)
ERYTHROCYTE [DISTWIDTH] IN BLOOD BY AUTOMATED COUNT: 13.8 % (ref 11.5–14.5)
EST. GFR  (AFRICAN AMERICAN): >60 ML/MIN/1.73 M^2
EST. GFR  (NON AFRICAN AMERICAN): >60 ML/MIN/1.73 M^2
GLUCOSE SERPL-MCNC: 229 MG/DL (ref 70–110)
HCT VFR BLD AUTO: 33.7 % (ref 37–48.5)
HGB BLD-MCNC: 10.2 G/DL (ref 12–16)
IMM GRANULOCYTES # BLD AUTO: 0.1 K/UL (ref 0–0.04)
IMM GRANULOCYTES NFR BLD AUTO: 0.7 % (ref 0–0.5)
INR PPP: 1 (ref 0.8–1.2)
LYMPHOCYTES # BLD AUTO: 1.2 K/UL (ref 1–4.8)
LYMPHOCYTES NFR BLD: 7.9 % (ref 18–48)
MAGNESIUM SERPL-MCNC: 1.5 MG/DL (ref 1.6–2.6)
MCH RBC QN AUTO: 26.8 PG (ref 27–31)
MCHC RBC AUTO-ENTMCNC: 30.3 G/DL (ref 32–36)
MCV RBC AUTO: 89 FL (ref 82–98)
MONOCYTES # BLD AUTO: 1 K/UL (ref 0.3–1)
MONOCYTES NFR BLD: 6.4 % (ref 4–15)
NEUTROPHILS # BLD AUTO: 12.5 K/UL (ref 1.8–7.7)
NEUTROPHILS NFR BLD: 82.9 % (ref 38–73)
NRBC BLD-RTO: 0 /100 WBC
PHOSPHATE SERPL-MCNC: 2.7 MG/DL (ref 2.7–4.5)
PLATELET # BLD AUTO: 346 K/UL (ref 150–350)
PMV BLD AUTO: 12 FL (ref 9.2–12.9)
POCT GLUCOSE: 157 MG/DL (ref 70–110)
POCT GLUCOSE: 165 MG/DL (ref 70–110)
POCT GLUCOSE: 165 MG/DL (ref 70–110)
POCT GLUCOSE: 192 MG/DL (ref 70–110)
POCT GLUCOSE: 216 MG/DL (ref 70–110)
POCT GLUCOSE: 229 MG/DL (ref 70–110)
POCT GLUCOSE: 229 MG/DL (ref 70–110)
POCT GLUCOSE: 251 MG/DL (ref 70–110)
POCT GLUCOSE: 251 MG/DL (ref 70–110)
POCT GLUCOSE: 294 MG/DL (ref 70–110)
POCT GLUCOSE: 299 MG/DL (ref 70–110)
POCT GLUCOSE: 309 MG/DL (ref 70–110)
POCT GLUCOSE: 310 MG/DL (ref 70–110)
POCT GLUCOSE: 320 MG/DL (ref 70–110)
POTASSIUM SERPL-SCNC: 4.2 MMOL/L (ref 3.5–5.1)
PROT SERPL-MCNC: 7.6 G/DL (ref 6–8.4)
PROTHROMBIN TIME: 10 SEC (ref 9–12.5)
RBC # BLD AUTO: 3.8 M/UL (ref 4–5.4)
SODIUM SERPL-SCNC: 138 MMOL/L (ref 136–145)
WBC # BLD AUTO: 15.1 K/UL (ref 3.9–12.7)

## 2019-05-01 PROCEDURE — 63600175 PHARM REV CODE 636 W HCPCS: Performed by: STUDENT IN AN ORGANIZED HEALTH CARE EDUCATION/TRAINING PROGRAM

## 2019-05-01 PROCEDURE — 63600175 PHARM REV CODE 636 W HCPCS: Performed by: NURSE PRACTITIONER

## 2019-05-01 PROCEDURE — 99233 PR SUBSEQUENT HOSPITAL CARE,LEVL III: ICD-10-PCS | Mod: ,,, | Performed by: PHYSICIAN ASSISTANT

## 2019-05-01 PROCEDURE — 63600175 PHARM REV CODE 636 W HCPCS: Performed by: ANESTHESIOLOGY

## 2019-05-01 PROCEDURE — 25000003 PHARM REV CODE 250: Performed by: PHYSICIAN ASSISTANT

## 2019-05-01 PROCEDURE — 25500020 PHARM REV CODE 255: Performed by: UROLOGY

## 2019-05-01 PROCEDURE — 94761 N-INVAS EAR/PLS OXIMETRY MLT: CPT

## 2019-05-01 PROCEDURE — 83735 ASSAY OF MAGNESIUM: CPT

## 2019-05-01 PROCEDURE — 27200966 HC CLOSED SUCTION SYSTEM

## 2019-05-01 PROCEDURE — 25000003 PHARM REV CODE 250: Performed by: ANESTHESIOLOGY

## 2019-05-01 PROCEDURE — 25000003 PHARM REV CODE 250: Performed by: STUDENT IN AN ORGANIZED HEALTH CARE EDUCATION/TRAINING PROGRAM

## 2019-05-01 PROCEDURE — 99233 SBSQ HOSP IP/OBS HIGH 50: CPT | Mod: ,,, | Performed by: NURSE PRACTITIONER

## 2019-05-01 PROCEDURE — 99900035 HC TECH TIME PER 15 MIN (STAT)

## 2019-05-01 PROCEDURE — 99233 PR SUBSEQUENT HOSPITAL CARE,LEVL III: ICD-10-PCS | Mod: ,,, | Performed by: NURSE PRACTITIONER

## 2019-05-01 PROCEDURE — 99233 SBSQ HOSP IP/OBS HIGH 50: CPT | Mod: 24,GC,, | Performed by: SURGERY

## 2019-05-01 PROCEDURE — 20000000 HC ICU ROOM

## 2019-05-01 PROCEDURE — 85025 COMPLETE CBC W/AUTO DIFF WBC: CPT

## 2019-05-01 PROCEDURE — 84100 ASSAY OF PHOSPHORUS: CPT

## 2019-05-01 PROCEDURE — 99233 PR SUBSEQUENT HOSPITAL CARE,LEVL III: ICD-10-PCS | Mod: 24,GC,, | Performed by: SURGERY

## 2019-05-01 PROCEDURE — 85610 PROTHROMBIN TIME: CPT

## 2019-05-01 PROCEDURE — 27000221 HC OXYGEN, UP TO 24 HOURS

## 2019-05-01 PROCEDURE — 94770 HC EXHALED C02 TEST: CPT

## 2019-05-01 PROCEDURE — 87449 NOS EACH ORGANISM AG IA: CPT

## 2019-05-01 PROCEDURE — 63600175 PHARM REV CODE 636 W HCPCS: Performed by: PHYSICIAN ASSISTANT

## 2019-05-01 PROCEDURE — 94003 VENT MGMT INPAT SUBQ DAY: CPT

## 2019-05-01 PROCEDURE — 99900026 HC AIRWAY MAINTENANCE (STAT)

## 2019-05-01 PROCEDURE — C9113 INJ PANTOPRAZOLE SODIUM, VIA: HCPCS | Performed by: ANESTHESIOLOGY

## 2019-05-01 PROCEDURE — 99233 SBSQ HOSP IP/OBS HIGH 50: CPT | Mod: ,,, | Performed by: PHYSICIAN ASSISTANT

## 2019-05-01 PROCEDURE — 80053 COMPREHEN METABOLIC PANEL: CPT

## 2019-05-01 PROCEDURE — 25000003 PHARM REV CODE 250: Performed by: NURSE PRACTITIONER

## 2019-05-01 RX ADMIN — VANCOMYCIN HYDROCHLORIDE 1250 MG: 100 INJECTION, POWDER, LYOPHILIZED, FOR SOLUTION INTRAVENOUS at 09:05

## 2019-05-01 RX ADMIN — FUROSEMIDE 40 MG: 10 INJECTION, SOLUTION INTRAMUSCULAR; INTRAVENOUS at 08:05

## 2019-05-01 RX ADMIN — ENOXAPARIN SODIUM 40 MG: 100 INJECTION SUBCUTANEOUS at 05:05

## 2019-05-01 RX ADMIN — CHLORHEXIDINE GLUCONATE 0.12% ORAL RINSE 15 ML: 1.2 LIQUID ORAL at 08:05

## 2019-05-01 RX ADMIN — DEXMEDETOMIDINE HYDROCHLORIDE 1.4 MCG/KG/HR: 400 INJECTION INTRAVENOUS at 11:05

## 2019-05-01 RX ADMIN — IOHEXOL 100 ML: 350 INJECTION, SOLUTION INTRAVENOUS at 01:05

## 2019-05-01 RX ADMIN — DEXMEDETOMIDINE HYDROCHLORIDE 1.4 MCG/KG/HR: 400 INJECTION INTRAVENOUS at 09:05

## 2019-05-01 RX ADMIN — CHLORHEXIDINE GLUCONATE 0.12% ORAL RINSE 15 ML: 1.2 LIQUID ORAL at 09:05

## 2019-05-01 RX ADMIN — DEXMEDETOMIDINE HYDROCHLORIDE 1.4 MCG/KG/HR: 400 INJECTION INTRAVENOUS at 04:05

## 2019-05-01 RX ADMIN — MAGNESIUM SULFATE HEPTAHYDRATE 2 G: 40 INJECTION, SOLUTION INTRAVENOUS at 06:05

## 2019-05-01 RX ADMIN — RIFAMPIN 300 MG: 600 INJECTION, POWDER, LYOPHILIZED, FOR SOLUTION INTRAVENOUS at 02:05

## 2019-05-01 RX ADMIN — SODIUM CHLORIDE 1 UNITS/HR: 9 INJECTION, SOLUTION INTRAVENOUS at 09:05

## 2019-05-01 RX ADMIN — CEFTRIAXONE 2 G: 2 INJECTION, SOLUTION INTRAVENOUS at 05:05

## 2019-05-01 RX ADMIN — FUROSEMIDE 40 MG: 10 INJECTION, SOLUTION INTRAMUSCULAR; INTRAVENOUS at 06:05

## 2019-05-01 RX ADMIN — FENTANYL CITRATE 50 MCG: 50 INJECTION INTRAMUSCULAR; INTRAVENOUS at 10:05

## 2019-05-01 RX ADMIN — DEXMEDETOMIDINE HYDROCHLORIDE 1.4 MCG/KG/HR: 400 INJECTION INTRAVENOUS at 01:05

## 2019-05-01 RX ADMIN — DEXMEDETOMIDINE HYDROCHLORIDE 1.4 MCG/KG/HR: 400 INJECTION INTRAVENOUS at 08:05

## 2019-05-01 RX ADMIN — DEXMEDETOMIDINE HYDROCHLORIDE 1.4 MCG/KG/HR: 400 INJECTION INTRAVENOUS at 12:05

## 2019-05-01 RX ADMIN — PANTOPRAZOLE SODIUM 40 MG: 40 INJECTION, POWDER, LYOPHILIZED, FOR SOLUTION INTRAVENOUS at 08:05

## 2019-05-01 RX ADMIN — RIFAMPIN 300 MG: 600 INJECTION, POWDER, LYOPHILIZED, FOR SOLUTION INTRAVENOUS at 03:05

## 2019-05-01 NOTE — PROGRESS NOTES
"[facility]  Endocrinology  Progress Note    Admit Date: 4/20/2019     Reason for Consult: Management of T2DM, Hyperglycemia     Surgical Procedure and Date:       04/21/2019:         1. Exploratory laparotomy        2. Ligation of left ureter        3. Removal of left JJ ureteral stent      Diabetes diagnosis year: ANDRE    Home Diabetes Medications:  ANDRE  Toujeo 50 BID  Invokana 300mg daily   Novolog 35 units AM, 45 units PM, 35 units PM    How often checking glucose at home? ANDRE   BG readings on regimen: ANDRE  Hypoglycemia on the regimen?  ANDRE  Missed doses on regimen?  ANDRE    Diabetes Complications include:     Hyperglycemia and Diabetic retinopathy     Complicating diabetes co morbidities:   History of MI and MURTAZA, CAD, HLD    HPI:   Patient is a 59 y.o. female with a diagnosis of HTN, HLN, DM type 2, nonproliferative diabetic renopathy, CAD, NSTEMI, and back pain who presents to the ED with a complaint of altered mental status on 04/20/2019. Is now s/p Exploratory laparotomy, Ligation of left ureter, and Removal of left JJ ureteral stent. Endocrinology consulted to manage DM2/Hyperglycemia.    Lab Results   Component Value Date    HGBA1C 10.8 (H) 02/19/2019       Interval HPI:   Overnight events: NAEON. BG elevated above goal (> 200). IV intensive insulin protocol initiated this morning per primary team.   Eating:   NPO  Nausea: No  Hypoglycemia and intervention: No  Fever: low grade (99.5)   TPN and/or TF: Yes  If yes, type of TF/TPN and rate: Peptamen Intense VHP at 40 cc/hr (goal)     /64   Pulse 80   Temp 99.5 °F (37.5 °C) (Oral)   Resp 16   Ht 5' 4" (1.626 m)   Wt 79.5 kg (175 lb 4.3 oz)   SpO2 96%   Breastfeeding? No   BMI 30.08 kg/m²      Labs Reviewed and Include    Recent Labs   Lab 05/01/19  0344   *   CALCIUM 9.8   ALBUMIN 1.8*   PROT 7.6      K 4.2   CO2 27   CL 99   BUN 26*   CREATININE 0.7   ALKPHOS 218*   ALT 56*   AST 45*   BILITOT 2.0*     Lab Results "   Component Value Date    WBC 15.10 (H) 05/01/2019    HGB 10.2 (L) 05/01/2019    HCT 33.7 (L) 05/01/2019    MCV 89 05/01/2019     05/01/2019     No results for input(s): TSH, FREET4 in the last 168 hours.  Lab Results   Component Value Date    HGBA1C 10.8 (H) 02/19/2019       Nutritional status:   Body mass index is 30.08 kg/m².  Lab Results   Component Value Date    ALBUMIN 1.8 (L) 05/01/2019    ALBUMIN 1.5 (L) 04/30/2019    ALBUMIN 1.5 (L) 04/29/2019     No results found for: PREALBUMIN    Estimated Creatinine Clearance: 89.3 mL/min (based on SCr of 0.7 mg/dL).    Accu-Checks  Recent Labs     04/28/19  1955 04/28/19  2358 04/29/19  0835 04/29/19  1153 04/29/19  1556 04/29/19  2002 04/29/19  2337 04/30/19  0818 04/30/19  1147 04/30/19 2022   POCTGLUCOSE 168* 146* 178* 220* 204* 231* 218* 225* 229* 273*       Current Medications and/or Treatments Impacting Glycemic Control  Immunotherapy:    Immunosuppressants     None        Steroids:   Hormones (From admission, onward)    None        Pressors:    Autonomic Drugs (From admission, onward)    None        Hyperglycemia/Diabetes Medications:   Antihyperglycemics (From admission, onward)    Start     Stop Route Frequency Ordered    05/01/19 1015  insulin regular (Humulin R) 100 Units in sodium chloride 0.9% 100 mL infusion     Question:  Insulin Rate Adjustment (DO NOT MODIFY ANSWER)  Answer:  [email or web address]    -- IV Continuous 05/01/19 0910          ASSESSMENT and PLAN    * Ureteral transection of left native kidney  Managed per primary team  Avoid hypoglycemia        Type 2 diabetes mellitus with diabetic peripheral angiopathy without gangrene  BG goal 140 - 180    Continue IV insulin infusion protocol   Requires intensive BG monitoring while on protocol     Plan to change to transition IV insulin infusion with BG monitoring q 4 hrs once BG stabilizes     ** Please notify Endocrine for any change  and/or advance in diet/TF**  ** Please call Endocrine for any BG related issues **    Discharge Recommendations:     TBD.             CAD (coronary artery disease)    Managed per primary team  Condition may cause insulin resistance       Sleep apnea  May affect BG readings if uncontrolled        PAPA (acute kidney injury)  Caution with insulin stacking        HLD (hyperlipidemia)  Managed per primary team  Condition may cause insulin resistance         On enteral nutrition  May cause BG excursions.           Jolanta Morales NP  Endocrinology  [facility]

## 2019-05-01 NOTE — CARE UPDATE
Pt spiked fever to 101.7 tonight. Given Tylenol. Will get CT abd/pel to rule out any intraabdominal process. Discussed with both Urology and Neurosurgery teams, all in agreement.     Zoë Du MD CA-1  Critical Care - Surgery

## 2019-05-01 NOTE — PLAN OF CARE
Problem: Adult Inpatient Plan of Care  Goal: Plan of Care Review  Outcome: Ongoing (interventions implemented as appropriate)  No s/s of distress today.  Pt remains intubated, current ventilator settings: CPAP (PSV+ mode), PEEP 5, FiO2 50%.  Precedex IV provides full effective relief.  Plan for NPO after midnight for planned surgical procedures tomorrow. Insulin IV continuous infusion initiated and titrated per orders, with accu-check frequency increased to hourly.  Special Contact Isolation status initated, and stool sent to lab to test for Clostridium difficile per orders.  Plan of care reviewed with pt's spouse, and verbalization of understanding obtained.  Family conference performed today, with questions answered and emotional support provided.

## 2019-05-01 NOTE — PROGRESS NOTES
Subjective/Objective  Interval History/Significant Events: Febrile to 101.7 overnight. CT abd/pel obtained, revealed no intraabdominal process, only a fluid collection within the midline abdominal wall, likely seroma. Doing ok on PAV+ 50% overnight. Pt and family decided that they are willing to undergo tracheostomy if needed.     Follow-up For: Procedure(s) (LRB):  Creation, Nephrostomy, Percutaneous (Left)    Post-Operative Day: 10 Days Post-Op    Objective:     Vital Signs (Most Recent):  Temp: 99.5 °F (37.5 °C) (05/01/19 0500)  Pulse: 79 (05/01/19 0747)  Resp: 16 (04/29/19 0335)  BP: 136/63 (05/01/19 0747)  SpO2: 96 % (05/01/19 0747) Vital Signs (24h Range):  Temp:  [97.3 °F (36.3 °C)-101.7 °F (38.7 °C)] 99.5 °F (37.5 °C)  Pulse:  [] 79  SpO2:  [93 %-100 %] 96 %  BP: (126-189)/(57-84) 136/63     Weight: 79.5 kg (175 lb 4.3 oz)  Body mass index is 30.08 kg/m².      Intake/Output Summary (Last 24 hours) at 5/1/2019 0819  Last data filed at 5/1/2019 0600  Gross per 24 hour   Intake 3043 ml   Output 4995 ml   Net -1952 ml       Physical Exam   Constitutional: She appears well-developed and well-nourished.   HENT:   Head: Normocephalic and atraumatic.   Intubated   Eyes: Right eye exhibits no discharge. Left eye exhibits no discharge.   Neck: Normal range of motion. Neck supple.   Cardiovascular: Normal rate and regular rhythm.   Pulmonary/Chest: Effort normal. No respiratory distress.   Intubated, secretions suctioned.    Abdominal:   Midline incision c/d/i.  MADHURI with scant serous drainage.  Abdomen soft, non-distended. Rectal tube with watery brown output.     Genitourinary:   Genitourinary Comments: Nephrostomy tube in place with watery dark yellow output.  Fontenot catheter+   Musculoskeletal: Normal range of motion.   Neurological:   Able to follow commands, denying pain. Lightly sedated on precedex gtt   Skin: Skin is warm and dry.   Vitals reviewed.    Vents:  Vent Mode: Spont (05/01/19 0747)  Set Rate: 16  bmp (04/29/19 0911)  Vt Set: 400 mL (04/29/19 0911)  Pressure Support: 0 cmH20 (04/29/19 1014)  PEEP/CPAP: 5 cmH20 (05/01/19 0747)  Oxygen Concentration (%): 50 (05/01/19 0747)  Peak Airway Pressure: 12 cmH2O (05/01/19 0747)  Plateau Pressure: 0 cmH20 (05/01/19 0747)  Total Ve: 5.44 mL (05/01/19 0747)  Negative Inspiratory Force (cm H2O): -18 (04/27/19 1306)  F/VT Ratio<105 (RSBI): (!) 41.78 (04/29/19 0335)    Lines/Drains/Airways     Drain                 Closed/Suction Drain 04/21/19 0300 LLQ 10 days         NG/OG Tube 04/21/19 0728 Center mouth 10 days         Nephrostomy 04/21/19 0629 Left 8 Fr. 10 days         Urethral Catheter 04/20/19 1711 Latex 16 Fr. 10 days         Rectal Tube 04/21/19 2100 rectal tube w/ balloon (indicate number of mLs) 9 days          Airway                 Airway - Non-Surgical 04/21/19 0029 Endotracheal Tube 10 days          Peripheral Intravenous Line                 Peripheral IV - Single Lumen 04/30/19 1505 20 G Left Forearm less than 1 day         Peripheral IV - Single Lumen 04/30/19 1505 20 G Right Forearm less than 1 day                Significant Labs:    ABG:  None today    CBC/Anemia Profile:  Recent Labs   Lab 04/30/19  0300 05/01/19  0344   WBC 13.66* 15.10*   HGB 8.6* 10.2*   HCT 28.3* 33.7*    346   MCV 90 89   RDW 14.4 13.8        Chemistries:  Recent Labs   Lab 04/30/19  0300 05/01/19  0344    138   K 3.9 4.2    99   CO2 29 27   BUN 28* 26*   CREATININE 0.7 0.7   CALCIUM 9.1 9.8   ALBUMIN 1.5* 1.8*   PROT 6.5 7.6   BILITOT 1.7* 2.0*   ALKPHOS 198* 218*   ALT 64* 56*   AST 61* 45*   MG 1.6 1.5*   PHOS 2.7 2.7       Significant Imaging:  I have reviewed all pertinent imaging results/findings within the past 24 hours.      Scheduled Meds:   cefTRIAXone (ROCEPHIN) IVPB  2 g Intravenous Q24H    chlorhexidine  15 mL Mouth/Throat BID    enoxaparin  40 mg Subcutaneous Daily    furosemide  40 mg Intravenous BID    pantoprazole  40 mg Intravenous Daily     rifAMpin (RIFADIN) IVPB  300 mg Intravenous Q12H    vancomycin (VANCOCIN) IVPB  1,250 mg Intravenous Q12H     Continuous Infusions:   dexmedetomidine (PRECEDEX) infusion 1.4 mcg/kg/hr (19 0800)    insulin (HUMAN R) infusion (adults)       PRN Meds:acetaminophen, albuterol-ipratropium, calcium gluconate IVPB, calcium gluconate IVPB, calcium gluconate IVPB, dextrose 50%, dextrose 50%, [] fentaNYL **FOLLOWED BY** fentaNYL, glucagon (human recombinant), glucose, glucose, glycopyrrolate, hydrALAZINE, insulin aspart U-100, magnesium sulfate IVPB, magnesium sulfate IVPB, nitroGLYCERIN, ondansetron, potassium chloride in water **AND** potassium chloride in water **AND** potassium chloride in water, promethazine (PHENERGAN) IVPB, sodium chloride 0.9%    Vital signs in last 24 hours:  Temp:  [97.3 °F (36.3 °C)-101.7 °F (38.7 °C)] 99.5 °F (37.5 °C)  Pulse:  [] 80  SpO2:  [93 %-100 %] 96 %  BP: (126-189)/(57-84) 138/64    Intake/Output last 3 shifts:  I/O last 3 completed shifts:  In: 4527.6 [I.V.:1647.6; NG/GT:2880]  Out: 6740 [Urine:6220; Drains:95; Stool:425]  Intake/Output this shift:  I/O this shift:  In: 40 [NG/GT:40]  Out: 140 [Urine:140]    Problem Assessment/Plan  Cardiac/Vascular  CAD (coronary artery disease)  Assessment & Plan  Mariann CROW Refugio is a 59 y.o. female s/p left ureteral injury on , who presented with fever, AMS, and intraabdominal abscess, taken for washout and ligation of left ureter with nephrostomy tube placement on . Difficulty weaning from vent, despite diuresis and weaning exercises. Possibly may require tracheostomy in the coming days, family and pt ok with trach if needed.      Neuro:   - Sedated with precedex gtt.   - Pain control with fentanyl prn      Pulmonary:   - Intubated and sedated on precedex gtt.    - Daily CXR, stable  - Will continue diuresis  - ABGs prn   - Will continue to wean, cont SBTs  - Robinol for high secretions     Cardiac:   - HDS at this  time. Pt has been weaned off pressors.   - TTE 4/22, EF 55%     Renal:    - S/p transection of left ureter 4/12 and subsequent ligation and PCT nephrostomy tube placement with IR 4/21  - Renal function improving. BUN 30 and Cr 0.8 today.        - Lasix 40 IV BID  - UOP 4.6 L total, net -1.9 L  - Monitor I/Os    Intake/Output Summary (Last 24 hours) at 5/1/2019 0820  Last data filed at 5/1/2019 0600  Gross per 24 hour   Intake 3043 ml   Output 4995 ml   Net -1952 ml        ID:   - Blood cultures 4/12 +E. Coli; sensitivities pending. Repeat Bld Cx show NGTD.   - UCx from 4/20 growing E. Coli; sensitivities are now back. Repeat Cx pending.   - ID following for assistance with abx therapy, added Vanc per their recs on 4/30  - Febrile to 101.7 overnight, White count at 15 today from 13.   - BCx with NGT  - MiniBAL with NGT, no white cells, no organisms  - CT abd/pel obtained 4/30, revealing no intraabdominal process     Hem/Onc:   - H/H stable at this time; cont to monitor     Endocrine:  - DM Type 2 at baseline    - BG >200, will start insulin gtt today       Fluids/Electrolytes/Nutrition/GI:   # Shock Liver          - Resolved           - Labs continue to show improvement          - Elevated LFTs now down trending; AST 47; ALT 84          - Thrombocytopenia; plts count improving; [id number]           - INR normalized     # Lactic Acidosis          - Now resolved    # Diarrhea          - Pt with multiple episodes of loose watery stool resembling urine noted from rectal tube; 400 ml watery stool overnight          - C.Diff panel Negative          - MADHURI drain also with yellow fluid resembling urine.  Sample sent for Cr analysis, 1.7.      - Maintain TF @ 40 ml/hr  - Start free water flushes 300 cc q6h  - Replace electrolytes PRN        PPx:  - DVT: Lovenox        DISPO:  - Continue SICU care  - Will book patient for Trach/Peg tomorrow, will need to hold TF at midnight        Zoë Du MD CA-1

## 2019-05-01 NOTE — PROGRESS NOTES
[facility]  Urology  Progress Note    Patient Name: Mariann Huff  MRN: [id number]  Admission Date: 4/20/2019  Hospital Length of Stay: 11 days  Code Status: Full Code   Attending Provider: Paul Carlson MD   Primary Care Physician: Jasbir Haney MD    Subjective:     HPI:  Mariann Huff is a 59 y.o. female with history of HTN, type 2 diabetes mellitus, CAD, NSTEMI, and back pain who presents to [facility] with altered mental status and sepsis. She underwent L5-S1 OLIF with NSGY on 4/12 and had intraoperative left ureteral injury with ureteroureteral anastomosis and ureteral stent placement. She did well initially and had correa and MADHURI drain removed on 4/16. She began having chills and altered mental status 2 days ago and this has progressively worsened. No complaints of pain.     She is altered and HPI is limited. In the ED she is febrile to 103 and tachycardic and pressures are low normal. WBC is 5, creatinine is 3.6 baseline 1.0, lactate is 4.6. Cath UA concerning for infection, 3+ LE, >100 WBCs, and many bacteria on microscopy.    CT and MRI abdomen both show air with minimal fluid near the surgical site with left ureter coursing through. There is air throughout the proximal collecting system which is decompressed with JJ ureteral stent in good position.    Taken for ex lap, ligation of left ureter and left neph tube placement on 4/21/19.    Interval History:   No acute events overnight  Remains intubated  Febrile to 101.7 yesterday  CT repeated - abdominal seroma noted  Alert and responding to questions    Review of Systems  Objective:     Temp:  [97.2 °F (36.2 °C)-101.7 °F (38.7 °C)] 99.5 °F (37.5 °C)  Pulse:  [] 80  SpO2:  [93 %-100 %] 95 %  BP: (126-189)/(57-84) 148/68     Body mass index is 30.08 kg/m².           Drains     Drain                 Closed/Suction Drain 04/21/19 0300 LLQ 10 days         NG/OG Tube 04/21/19 0728 Center mouth 10 days         Nephrostomy 04/21/19 0629 Left 8  Fr. 10 days         Urethral Catheter 04/20/19 1711 Latex 16 Fr. 10 days         Rectal Tube 04/21/19 2100 rectal tube w/ balloon (indicate number of mLs) 9 days                Physical Exam   Constitutional: She appears well-developed and well-nourished. No distress. She is intubated.   HENT:   Head: Normocephalic and atraumatic.   Neck: No JVD present.   Cardiovascular: Normal rate and regular rhythm.    Pulmonary/Chest: She is intubated.   mechanically ventilated   Abdominal: Soft. She exhibits no distension. There is no rebound and no guarding.   Incision c/d/i   MADHURI drain with serous output   Genitourinary:   Genitourinary Comments: Fontenot draining clear yellow  Left neph tube with clear yellow urine   Neurological: She is alert.   Skin: Skin is warm and dry. She is not diaphoretic. No pallor.       Significant Labs:    BMP:  Recent Labs   Lab 04/29/19  0300 04/30/19  0300 05/01/19  0344    141 138   K 3.7 3.9 4.2    102 99   CO2 29 29 27   BUN 31* 28* 26*   CREATININE 0.8 0.7 0.7   CALCIUM 9.1 9.1 9.8       CBC:   Recent Labs   Lab 04/29/19  0300 04/30/19  0300 05/01/19  0344   WBC 15.91* 13.66* 15.10*   HGB 8.7* 8.6* 10.2*   HCT 27.4* 28.3* 33.7*    274 346       All pertinent labs results from the past 24 hours have been reviewed.    Significant Imaging:  All pertinent imaging results/findings from the past 24 hours have been reviewed.      Assessment/Plan:     * Ureteral transection of left native kidney  Orantoinette Huff is a 59 y.o. female s/p left ureteral injury on 4/12, presented with fever, AMS, and intraabdominal abscess, taken for washout and ligation of left ureter with neph tube placement on 4/21    - Management per SICU  - Vent per SICU, continues to require pressure support, possible trach this week  - Original urine cultures with E. Coli, wound culture growing staph lugdeensis on Rocephin per ID recs. Rifampin for hardware. Febrile yesterday, ID re-consulted and vancomycin  added  - Maintain corera and neph tube at this time  - Maintain MADHURI drain  - No need for intervention of abdominal wall fluid collection at this time, continue to monitor clincially    Sepsis  As above        VTE Risk Mitigation (From admission, onward)        Ordered     enoxaparin injection 40 mg  Daily      04/30/19 0722     Place sequential compression device  Until discontinued      04/20/19 2108     IP VTE HIGH RISK PATIENT  Once      04/20/19 2108          Yulissa Salas MD  Urology  [facility]

## 2019-05-01 NOTE — SUBJECTIVE & OBJECTIVE
Interval History/Significant Events: Febrile to 101.7 overnight. CT abd/pel obtained, revealed no intraabdominal process, only a fluid collection within the midline abdominal wall, likely seroma. Doing ok on PAV+ 50% overnight. Pt and family decided that they are willing to undergo tracheostomy if needed.     Follow-up For: Procedure(s) (LRB):  Creation, Nephrostomy, Percutaneous (Left)    Post-Operative Day: 10 Days Post-Op    Objective:     Vital Signs (Most Recent):  Temp: 99.5 °F (37.5 °C) (05/01/19 0500)  Pulse: 79 (05/01/19 0747)  Resp: 16 (04/29/19 0335)  BP: 136/63 (05/01/19 0747)  SpO2: 96 % (05/01/19 0747) Vital Signs (24h Range):  Temp:  [97.3 °F (36.3 °C)-101.7 °F (38.7 °C)] 99.5 °F (37.5 °C)  Pulse:  [] 79  SpO2:  [93 %-100 %] 96 %  BP: (126-189)/(57-84) 136/63     Weight: 79.5 kg (175 lb 4.3 oz)  Body mass index is 30.08 kg/m².      Intake/Output Summary (Last 24 hours) at 5/1/2019 0819  Last data filed at 5/1/2019 0600  Gross per 24 hour   Intake 3043 ml   Output 4995 ml   Net -1952 ml       Physical Exam   Constitutional: She appears well-developed and well-nourished.   HENT:   Head: Normocephalic and atraumatic.   Intubated   Eyes: Right eye exhibits no discharge. Left eye exhibits no discharge.   Neck: Normal range of motion. Neck supple.   Cardiovascular: Normal rate and regular rhythm.   Pulmonary/Chest: Effort normal. No respiratory distress.   Intubated, secretions suctioned.    Abdominal:   Midline incision c/d/i.  MADHURI with scant serous drainage.  Abdomen soft, non-distended. Rectal tube with watery brown output.     Genitourinary:   Genitourinary Comments: Nephrostomy tube in place with watery dark yellow output.  Fontenot catheter+   Musculoskeletal: Normal range of motion.   Neurological:   Able to follow commands, denying pain. Lightly sedated on precedex gtt   Skin: Skin is warm and dry.   Vitals reviewed.    Vents:  Vent Mode: Spont (05/01/19 1225)  Set Rate: 16 bmp (04/29/19 0911)  Vt  Set: 400 mL (04/29/19 0911)  Pressure Support: 0 cmH20 (04/29/19 1014)  PEEP/CPAP: 5 cmH20 (05/01/19 0747)  Oxygen Concentration (%): 50 (05/01/19 0747)  Peak Airway Pressure: 12 cmH2O (05/01/19 0747)  Plateau Pressure: 0 cmH20 (05/01/19 0747)  Total Ve: 5.44 mL (05/01/19 0747)  Negative Inspiratory Force (cm H2O): -18 (04/27/19 1306)  F/VT Ratio<105 (RSBI): (!) 41.78 (04/29/19 0335)    Lines/Drains/Airways     Drain                 Closed/Suction Drain 04/21/19 0300 LLQ 10 days         NG/OG Tube 04/21/19 0728 Center mouth 10 days         Nephrostomy 04/21/19 0629 Left 8 Fr. 10 days         Urethral Catheter 04/20/19 1711 Latex 16 Fr. 10 days         Rectal Tube 04/21/19 2100 rectal tube w/ balloon (indicate number of mLs) 9 days          Airway                 Airway - Non-Surgical 04/21/19 0029 Endotracheal Tube 10 days          Peripheral Intravenous Line                 Peripheral IV - Single Lumen 04/30/19 1505 20 G Left Forearm less than 1 day         Peripheral IV - Single Lumen 04/30/19 1505 20 G Right Forearm less than 1 day                Significant Labs:    ABG:  None today    CBC/Anemia Profile:  Recent Labs   Lab 04/30/19  0300 05/01/19  0344   WBC 13.66* 15.10*   HGB 8.6* 10.2*   HCT 28.3* 33.7*    346   MCV 90 89   RDW 14.4 13.8        Chemistries:  Recent Labs   Lab 04/30/19  0300 05/01/19  0344    138   K 3.9 4.2    99   CO2 29 27   BUN 28* 26*   CREATININE 0.7 0.7   CALCIUM 9.1 9.8   ALBUMIN 1.5* 1.8*   PROT 6.5 7.6   BILITOT 1.7* 2.0*   ALKPHOS 198* 218*   ALT 64* 56*   AST 61* 45*   MG 1.6 1.5*   PHOS 2.7 2.7       Significant Imaging:  I have reviewed all pertinent imaging results/findings within the past 24 hours.

## 2019-05-01 NOTE — PLAN OF CARE
05/01/19 1139   Discharge Assessment   Assessment Type Discharge Planning Reassessment   Discharge Plan A Long-term acute care facility (LTAC)   Discharge Plan B Other  (TBD)   Patient/Family in Agreement with Plan yes     Pt remains in ICU, intubated but able to communicate and follows commands. The patient and family are in agreement for possible Trach and PEG placement in the next few days. Pt was febrile overnight 101.7. Currently not stable for transfer to LTAC. Discharge plan A would more than likely be LTAC. Will continue to monitor and help with discharge planning throughout admission.

## 2019-05-01 NOTE — SUBJECTIVE & OBJECTIVE
"Current Facility-Administered Medications on File Prior to Encounter   Medication    lidocaine (PF) 10 mg/ml (1%) injection 10 mg     Current Outpatient Medications on File Prior to Encounter   Medication Sig    albuterol (PROVENTIL/VENTOLIN HFA) 90 mcg/actuation inhaler Inhale 2 puffs into the lungs every 6 (six) hours as needed for Wheezing.    amLODIPine (NORVASC) 10 MG tablet TAKE 1 TABLET (10 MG TOTAL) BY MOUTH ONCE DAILY.    aspirin 81 mg Tab Take 81 mg by mouth. 1 Tablet Oral Every day    atorvastatin (LIPITOR) 40 MG tablet TAKE 1 TABLET (40 MG TOTAL) BY MOUTH ONCE DAILY.    ACCU-CHEK EDIN PLUS METER [facility]     BD INSULIN PEN NEEDLE UF MINI 31 x 3/16 " Ndle USE 4 TIMES A DAY    blood sugar diagnostic (ACCU-CHEK EDIN PLUS TEST STRP) Strp TEST BLOOD SUGARS 4 TIMES DAILY    chlorthalidone (HYGROTEN) 50 MG Tab Take 1 tablet (50 mg total) by mouth once daily.    cyclobenzaprine (FLEXERIL) 10 MG tablet TAKE 1 TABLET BY MOUTH 3 TIMES A DAY AS NEEDED FOR MUSCLE SPASMS. NO WORKING OR DRIVING ON THIS MED    esomeprazole (NEXIUM) 40 MG capsule TAKE 1 CAPSULE (40 MG TOTAL) BY MOUTH BEFORE BREAKFAST.    fenofibrate micronized (LOFIBRA) 134 MG Cap TAKE 1 CAPSULE (134 MG TOTAL) BY MOUTH BEFORE BREAKFAST.    flash glucose scanning reader (FREESTYLE MISTI 14 DAY READER) Misc 1 each by Misc.(Non-Drug; Combo Route) route once daily.    flash glucose sensor (FREESTYLE MISTI 14 DAY SENSOR) Kit 1 each by Misc.(Non-Drug; Combo Route) route once daily.    gabapentin (NEURONTIN) 100 MG capsule Take 2 capsules (200 mg total) by mouth every evening.    insulin aspart U-100 (NOVOLOG FLEXPEN U-100 INSULIN) 100 unit/mL InPn pen INJECT 35 U AM, 45 U AT NOON, 35 U PM.150-200 +2, 201-250 +4, 251-300 +6, 301-350 +8, >350 +10    insulin glargine, TOUJEO, (TOUJEO SOLOSTAR U-300 INSULIN) 300 unit/mL (1.5 mL) InPn pen Inject 50 Units into the skin 2 (two) times daily.    INVOKANA 300 mg Tab tablet Take 1 tablet (300 mg total) by " "mouth once daily.    irbesartan (AVAPRO) 300 MG tablet Take 1 tablet (300 mg total) by mouth every evening.    lancets 30 gauge Misc     metoprolol succinate (TOPROL-XL) 100 MG 24 hr tablet TAKE 1 TABLET (100 MG TOTAL) BY MOUTH ONCE DAILY.    nitroGLYCERIN (NITROSTAT) 0.4 MG SL tablet Take one every 2-3 min till chestpain relief for 3 times and if still no relief, call MD or come to ED    omega-3 acid ethyl esters (LOVAZA) 1 gram capsule Take 1 g by mouth once daily.     pen needle, diabetic (BD ULTRA-FINE GRABIEL PEN NEEDLES) 32 gauge x 5/32" Ndle For use with insulin pens daily 4 times a day    prasugrel (EFFIENT) 10 mg Tab TAKE 1 TABLET (10 MG TOTAL) BY MOUTH ONCE DAILY.    tamsulosin (FLOMAX) 0.4 mg Cap Take 1 capsule (0.4 mg total) by mouth every evening.    tramadol (ULTRAM) 50 mg tablet Take 1 tablet (50 mg total) by mouth 3 (three) times daily as needed for Pain.    TRULICITY 1.5 mg/0.5 mL PnIj INJECT 1.5 MG INTO THE SKIN EVERY 7 DAYS.    zolpidem (AMBIEN) 5 MG Tab Take 1 tablet (5 mg total) by mouth nightly as needed.       Review of patient's allergies indicates:  No Known Allergies    Past Medical History:   Diagnosis Date    Anticoagulant long-term use     Asthma     Back pain     CAD (coronary artery disease)     s/p stentimg  (2), (1)    Carotid artery stenosis     Diabetes mellitus type 2 in obese     HTN (hypertension), benign     Hyperlipidemia     Keloid cicatrix     NPDR (nonproliferative diabetic retinopathy) 2015    NSTEMI (non-ST elevated myocardial infarction)     Nuclear sclerosis - Right Eye 3/18/2014    Primary localized osteoarthrosis, lower leg 2014    Sleep apnea      Past Surgical History:   Procedure Laterality Date    CARDIAC CATHETERIZATION      cataract extraction left eye      cataracts      CATHETERIZATION, HEART, LEFT Left 2014    Performed by Wilman Kim [facility] at [facility] CATH LAB     SECTION, LOW TRANSVERSE      " CORONARY ANGIOPLASTY      Creation, Nephrostomy, Percutaneous Left 4/21/2019    Performed by Karina Surgeon at [facility] KARINA    ESOPHAGOGASTRODUODENOSCOPY (EGD) N/A 8/12/2016    Performed by Gardenia Adamson MD at [facility] ENDO (4TH FLR)    EXCISION TURBINATE, SUBMUCOUS      FUSION, SPINE, LUMBAR, ANTERIOR APPROACH Left L5-S1 Anterior to Psoa Interbody Fusion, L5-S1 Posterior Instrumentation Left 4/12/2019    Performed by Mk George MD at [facility] OR 2ND FLR    HAND SURGERY Left     HAND SURGERY Right     torn ligament repair/ Dr. Yeboah    HYSTERECTOMY      Injection,steroid,epidural,transforaminal approach - Bilateral - S1 Bilateral 9/25/2018    Performed by Tl Abreu MD at [facility] PAIN MGT    Injection-steroid-epidural-caudal N/A 2/7/2019    Performed by Dave Bentley Jr., MD at [facility] PAIN MGT    INSERTION-INTRAOCULAR LENS (IOL) Right 9/1/2015    Performed by Good Domingo MD at [facility] OR 1ST FLR    LAPAROTOMY, EXPLORATORY; LIGATION OF LEFT URETER; DOUBLE J STENT REMOVAL LEFT URETER Left 4/20/2019    Performed by Paul Carlson MD at [facility] OR 2ND FLR    left foot surgery      left wrist surgery      NASAL SEPTUM SURGERY  5/7/15    PHACOEMULSIFICATION-ASPIRATION-CATARACT Right 9/1/2015    Performed by Good Domingo MD at [facility] OR 1ST FLR    REPAIR, URETER  4/12/2019    Performed by Mk George MD at [facility] OR 2ND FLR    RESECTION-TURBINATES (SMR) N/A 5/7/2015    Performed by Dileep Dubois III, MD at [facility] OR 2ND FLR    rt elbow surgery      S/P LAD COATED STENT  05/14/2010    6 total     S/P OM1 STENT  08/2003    SEPTOPLASTY N/A 5/7/2015    Performed by Dileep Dubois III, MD at [facility] OR 2ND FLR    SINUS SURGERY      F.E.S.S.    SINUS SURGERY FUNCTIONAL ENDOSCOPIC WITH NAVIGATION WITH MAXILLARIES, ETHMOIDS, LEFT SPHENOID, LEFT LOLY N/A 5/7/2015    Performed by Dileep Dubois III, MD at [facility] OR 2ND [facility]    STENT, URETERAL placement  4/12/2019    Performed by Robin  ALBERTO Boyd MD at [facility] OR [facility] FLR    TUBAL LIGATION       Family History     Problem Relation (Age of Onset)    Diabetes Mother, Father, Sister, Brother, Sister    Heart attack Father    Heart disease Mother    Leukemia Father    No Known Problems Sister, Brother, Brother, Maternal Grandmother, Maternal Grandfather, Paternal Grandmother, Paternal Grandfather, Son, Son, Maternal Aunt, Maternal Uncle, Paternal Aunt, Paternal Uncle        Tobacco Use    Smoking status: Never Smoker    Smokeless tobacco: Never Used   Substance and Sexual Activity    Alcohol use: No     Alcohol/week: 0.0 oz    Drug use: No    Sexual activity: Yes     Partners: Male     Birth control/protection: Post-menopausal     Comment:      Review of Systems     Negative except above    Objective:     Vital Signs (Most Recent):  Temp: 100.2 °F (37.9 °C) (05/01/19 1100)  Pulse: 76 (05/01/19 1115)  Resp: 16 (04/29/19 0335)  BP: 135/63 (05/01/19 1100)  SpO2: 96 % (05/01/19 1115) Vital Signs (24h Range):  Temp:  [98.8 °F (37.1 °C)-101.7 °F (38.7 °C)] 100.2 °F (37.9 °C)  Pulse:  [] 76  SpO2:  [94 %-100 %] 96 %  BP: (126-186)/(57-84) 135/63     Weight: 79.5 kg (175 lb 4.3 oz)  Body mass index is 30.08 kg/m².    Physical Exam     Constitutional: She appears well-developed and well-nourished.   Intubated   Cardiovascular: Normal rate and regular rhythm.   Pulmonary/Chest: Intubated, secretions suctioned.    Abdominal:   Midline incision c/d/i.  MADHURI with scant serous drainage.  Abdomen soft, non-distended. Rectal tube with watery brown output.     Nephrostomy tube in place with watery dark yellow output.  Fontenot catheter+   Neurological:   Able to follow commands    Significant Labs:  CBC:   Recent Labs   Lab 05/01/19  0344   WBC 15.10*   RBC 3.80*   HGB 10.2*   HCT 33.7*      MCV 89   MCH 26.8*   MCHC 30.3*     CMP:   Recent Labs   Lab 05/01/19  0344   *   CALCIUM 9.8   ALBUMIN 1.8*   PROT 7.6      K 4.2   CO2 27   CL 99    BUN 26*   CREATININE 0.7   ALKPHOS 218*   ALT 56*   AST 45*   BILITOT 2.0*

## 2019-05-01 NOTE — SUBJECTIVE & OBJECTIVE
Interval History:   No acute events overnight  Remains intubated  Febrile to 101.7 yesterday  CT repeated - abdominal seroma noted  Alert and responding to questions    Review of Systems  Objective:     Temp:  [97.2 °F (36.2 °C)-101.7 °F (38.7 °C)] 99.5 °F (37.5 °C)  Pulse:  [] 80  SpO2:  [93 %-100 %] 95 %  BP: (126-189)/(57-84) 148/68     Body mass index is 30.08 kg/m².           Drains     Drain                 Closed/Suction Drain 04/21/19 0300 LLQ 10 days         NG/OG Tube 04/21/19 0728 Center mouth 10 days         Nephrostomy 04/21/19 0629 Left 8 Fr. 10 days         Urethral Catheter 04/20/19 1711 Latex 16 Fr. 10 days         Rectal Tube 04/21/19 2100 rectal tube w/ balloon (indicate number of mLs) 9 days                Physical Exam   Constitutional: She appears well-developed and well-nourished. No distress. She is intubated.   HENT:   Head: Normocephalic and atraumatic.   Neck: No JVD present.   Cardiovascular: Normal rate and regular rhythm.    Pulmonary/Chest: She is intubated.   mechanically ventilated   Abdominal: Soft. She exhibits no distension. There is no rebound and no guarding.   Incision c/d/i   MADHURI drain with serous output   Genitourinary:   Genitourinary Comments: Fontenot draining clear yellow  Left neph tube with clear yellow urine   Neurological: She is alert.   Skin: Skin is warm and dry. She is not diaphoretic. No pallor.       Significant Labs:    BMP:  Recent Labs   Lab 04/29/19  0300 04/30/19  0300 05/01/19  0344    141 138   K 3.7 3.9 4.2    102 99   CO2 29 29 27   BUN 31* 28* 26*   CREATININE 0.8 0.7 0.7   CALCIUM 9.1 9.1 9.8       CBC:   Recent Labs   Lab 04/29/19  0300 04/30/19  0300 05/01/19  0344   WBC 15.91* 13.66* 15.10*   HGB 8.7* 8.6* 10.2*   HCT 27.4* 28.3* 33.7*    274 346       All pertinent labs results from the past 24 hours have been reviewed.    Significant Imaging:  All pertinent imaging results/findings from the past 24 hours have been reviewed.

## 2019-05-01 NOTE — ASSESSMENT & PLAN NOTE
BG goal 140 - 180    Continue IV insulin infusion protocol   Requires intensive BG monitoring while on protocol     Plan to change to transition IV insulin infusion with BG monitoring q 4 hrs once BG stabilizes     ** Please notify Endocrine for any change and/or advance in diet/TF**  ** Please call Endocrine for any BG related issues **    Discharge Recommendations:     TBD.

## 2019-05-01 NOTE — ASSESSMENT & PLAN NOTE
"Ms. Huff is a 60 yo F with PMH of HTN, DMII, CAD, NSTEMI, s/p L5-S1 OLIF with NSGY 4/12 c/b intraoperative left ureteral injury and underwent ureteroureteral anastomoses and ureteral stent placement complicated by sepsis due to E.coli septicemia now s/p ex lap on 4/21 since then patient has been intubated and unable to be weaned off the vent.     - General surgery consulted for PEG and Trach placement.   - Will proceed with placement tomorrow.   -  very frustrated because surgery is not happening today. Explained is not emergent procedure and she is currently getting tube feeds so we are not able to do it today. He then refused to sign consents until he "calm down".   - Will try later today.   - Thank you for the consult, please call if any questions.     "

## 2019-05-01 NOTE — SUBJECTIVE & OBJECTIVE
"Interval HPI:   Overnight events: NAEON. BG elevated above goal (> 200). IV intensive insulin protocol initiated this morning per primary team.   Eating:   NPO  Nausea: No  Hypoglycemia and intervention: No  Fever: low grade (99.5)   TPN and/or TF: Yes  If yes, type of TF/TPN and rate: Peptamen Intense VHP at 40 cc/hr (goal)     /64   Pulse 80   Temp 99.5 °F (37.5 °C) (Oral)   Resp 16   Ht 5' 4" (1.626 m)   Wt 79.5 kg (175 lb 4.3 oz)   SpO2 96%   Breastfeeding? No   BMI 30.08 kg/m²     Labs Reviewed and Include    Recent Labs   Lab 05/01/19  0344   *   CALCIUM 9.8   ALBUMIN 1.8*   PROT 7.6      K 4.2   CO2 27   CL 99   BUN 26*   CREATININE 0.7   ALKPHOS 218*   ALT 56*   AST 45*   BILITOT 2.0*     Lab Results   Component Value Date    WBC 15.10 (H) 05/01/2019    HGB 10.2 (L) 05/01/2019    HCT 33.7 (L) 05/01/2019    MCV 89 05/01/2019     05/01/2019     No results for input(s): TSH, FREET4 in the last 168 hours.  Lab Results   Component Value Date    HGBA1C 10.8 (H) 02/19/2019       Nutritional status:   Body mass index is 30.08 kg/m².  Lab Results   Component Value Date    ALBUMIN 1.8 (L) 05/01/2019    ALBUMIN 1.5 (L) 04/30/2019    ALBUMIN 1.5 (L) 04/29/2019     No results found for: PREALBUMIN    Estimated Creatinine Clearance: 89.3 mL/min (based on SCr of 0.7 mg/dL).    Accu-Checks  Recent Labs     04/28/19  1955 04/28/19  2358 04/29/19  0835 04/29/19  1153 04/29/19  1556 04/29/19 2002 04/29/19  2337 04/30/19  0818 04/30/19  1147 04/30/19 2022   POCTGLUCOSE 168* 146* 178* 220* 204* 231* 218* 225* 229* 273*       Current Medications and/or Treatments Impacting Glycemic Control  Immunotherapy:    Immunosuppressants     None        Steroids:   Hormones (From admission, onward)    None        Pressors:    Autonomic Drugs (From admission, onward)    None        Hyperglycemia/Diabetes Medications:   Antihyperglycemics (From admission, onward)    Start     Stop Route Frequency Ordered    " 05/01/19 1015  insulin regular (Humulin R) 100 Units in sodium chloride 0.9% 100 mL infusion     Question:  Insulin Rate Adjustment (DO NOT MODIFY ANSWER)  Answer:  [email or web address]    -- IV Continuous 05/01/19 0910

## 2019-05-01 NOTE — HPI
Ms. Huff is a 58 yo F with PMH of HTN, DMII, CAD, NSTEMI, s/p L5-S1 OLIF with NSGY 4/12 c/b intraoperative left ureteral injury and underwent ureteroureteral anastomoses and ureteral stent placement complicated by sepsis due to E.coli septicemia now s/p ex lap on 4/21 since then patient has been intubated and unable to be weaned off the vent.     General Surgery consulted for PEG and Trach.     On minimal vent settings. Tolerating Tube feeds through OGT.

## 2019-05-01 NOTE — ANESTHESIA PREPROCEDURE EVALUATION
05/01/2019  Mariann Huff is a 59 y.o., female.  Pre-operative evaluation for Procedure(s) (LRB):  INSERTION, PEG TUBE (N/A)  CREATION, TRACHEOSTOMY (N/A)    Mariann Huff is a 59 y.o. female PMH of MURTAZA, asthma, HTN, DMII, carotid stenosis 49% on right and 19% on left, CAD, NSTEMI, s/p L5-S1 OLIF with NSGY 4/12 c/b intraoperative left ureteral injury and underwent ureteroureteral anastomoses and ureteral stent placement complicated by sepsis due to E.coli septicemia now s/p ex lap on 4/21 since then patient has been intubated and unable to be weaned off the vent.     Patient now planned for the above procedure.    Mg is 1.5, should be replaced by primary.      Vent Mode: Spont  Oxygen Concentration (%):  [49-50] 49  Resp Rate Total:  [26 br/min-36 br/min] 29 br/min  PEEP/CPAP:  [5 cmH20] 5 cmH20  Mean Airway Pressure:  [6.7 cmH20-8.4 cmH20] 6.9 cmH20     LDA:   20G R foreram PIV  20G L forearm PIV    Prev airway:   RSI  Glidescope  Short neck  Poor head and neck extension    Drips: Insulin 2.8U/hr    Patient Active Problem List   Diagnosis    Hypertension associated with diabetes    Hyperlipidemia    CAD (coronary artery disease)    Sleep apnea    Carotid stenosis, bilateral    Non compliance with medical treatment    PAPA (acute kidney injury)    Coronary stent restenosis    S/P coronary artery stent placement    Nuclear sclerosis - Right Eye    Primary localized osteoarthrosis, lower leg    Chronic sinusitis    PSC (posterior subcapsular cataract), right    DME (diabetic macular edema)    Refractive error    Pseudophakia    Senile nuclear sclerosis    Type 2 diabetes mellitus with diabetic peripheral angiopathy without gangrene    Diabetic peripheral neuropathy associated with type 2 diabetes mellitus    Cervical arthritis    Neurogenic claudication    Lumbar herniated disc     "Fatty liver disease, nonalcoholic    Arthritis of lumbar spine    Chronic pain    Lumbar radiculopathy    Lumbosacral radiculopathy at L5    Ureteral transection of left native kidney    Ureteral stent retained    Sepsis    Encephalopathy    Type 2 diabetes mellitus, with long-term current use of insulin    HLD (hyperlipidemia)    Asthma    Essential hypertension    Altered mental status    Encounter for weaning from ventilator    Acidosis    At risk for fluid volume overload    On enteral nutrition    Postoperative infection    Acute postoperative respiratory failure    Acute pulmonary edema       Review of patient's allergies indicates:  No Known Allergies     Current Facility-Administered Medications on File Prior to Encounter   Medication Dose Route Frequency Provider Last Rate Last Dose    lidocaine (PF) 10 mg/ml (1%) injection 10 mg  1 mL Intradermal Once Dave Bentley Jr., MD         Current Outpatient Medications on File Prior to Encounter   Medication Sig Dispense Refill    albuterol (PROVENTIL/VENTOLIN HFA) 90 mcg/actuation inhaler Inhale 2 puffs into the lungs every 6 (six) hours as needed for Wheezing. 1 each 11    amLODIPine (NORVASC) 10 MG tablet TAKE 1 TABLET (10 MG TOTAL) BY MOUTH ONCE DAILY. 30 tablet 6    aspirin 81 mg Tab Take 81 mg by mouth. 1 Tablet Oral Every day      atorvastatin (LIPITOR) 40 MG tablet TAKE 1 TABLET (40 MG TOTAL) BY MOUTH ONCE DAILY. 30 tablet 11    ACCU-CHEK EDIN PLUS METER Misc       BD INSULIN PEN NEEDLE UF MINI 31 x 3/16 " Ndle USE 4 TIMES A  each 11    blood sugar diagnostic (ACCU-CHEK EDIN PLUS TEST STRP) Strp TEST BLOOD SUGARS 4 TIMES DAILY 150 strip 11    chlorthalidone (HYGROTEN) 50 MG Tab Take 1 tablet (50 mg total) by mouth once daily. 90 tablet 3    cyclobenzaprine (FLEXERIL) 10 MG tablet TAKE 1 TABLET BY MOUTH 3 TIMES A DAY AS NEEDED FOR MUSCLE SPASMS. NO WORKING OR DRIVING ON THIS MED 45 tablet 5    esomeprazole " "(NEXIUM) 40 MG capsule TAKE 1 CAPSULE (40 MG TOTAL) BY MOUTH BEFORE BREAKFAST. 90 capsule 3    fenofibrate micronized (LOFIBRA) 134 MG Cap TAKE 1 CAPSULE (134 MG TOTAL) BY MOUTH BEFORE BREAKFAST. 90 capsule 3    flash glucose scanning reader (FREESTYLE MISTI 14 DAY READER) Misc 1 each by Misc.(Non-Drug; Combo Route) route once daily. 2 each 11    flash glucose sensor (FREESTYLE MISTI 14 DAY SENSOR) Kit 1 each by Misc.(Non-Drug; Combo Route) route once daily. 2 kit 11    gabapentin (NEURONTIN) 100 MG capsule Take 2 capsules (200 mg total) by mouth every evening. 60 capsule 11    insulin aspart U-100 (NOVOLOG FLEXPEN U-100 INSULIN) 100 unit/mL InPn pen INJECT 35 U AM, 45 U AT NOON, 35 U PM.150-200 +2, 201-250 +4, 251-300 +6, 301-350 +8, >350 +10 30 Syringe 1    insulin glargine, TOUJEO, (TOUJEO SOLOSTAR U-300 INSULIN) 300 unit/mL (1.5 mL) InPn pen Inject 50 Units into the skin 2 (two) times daily. 6 Syringe 6    INVOKANA 300 mg Tab tablet Take 1 tablet (300 mg total) by mouth once daily. 30 tablet 6    irbesartan (AVAPRO) 300 MG tablet Take 1 tablet (300 mg total) by mouth every evening. 90 tablet 3    lancets 30 gauge Misc       metoprolol succinate (TOPROL-XL) 100 MG 24 hr tablet TAKE 1 TABLET (100 MG TOTAL) BY MOUTH ONCE DAILY. 30 tablet 11    nitroGLYCERIN (NITROSTAT) 0.4 MG SL tablet Take one every 2-3 min till chestpain relief for 3 times and if still no relief, call MD or come to ED 30 tablet 11    omega-3 acid ethyl esters (LOVAZA) 1 gram capsule Take 1 g by mouth once daily.       pen needle, diabetic (BD ULTRA-FINE GRABIEL PEN NEEDLES) 32 gauge x 5/32" Ndle For use with insulin pens daily 4 times a day 100 each 11    prasugrel (EFFIENT) 10 mg Tab TAKE 1 TABLET (10 MG TOTAL) BY MOUTH ONCE DAILY. 30 tablet 10    tamsulosin (FLOMAX) 0.4 mg Cap Take 1 capsule (0.4 mg total) by mouth every evening. 30 capsule 2    tramadol (ULTRAM) 50 mg tablet Take 1 tablet (50 mg total) by mouth 3 (three) times " daily as needed for Pain. 60 tablet 1    TRULICITY 1.5 mg/0.5 mL PnIj INJECT 1.5 MG INTO THE SKIN EVERY 7 DAYS. 2 mL 6    zolpidem (AMBIEN) 5 MG Tab Take 1 tablet (5 mg total) by mouth nightly as needed. 30 tablet 2       Past Surgical History:   Procedure Laterality Date    CARDIAC CATHETERIZATION      cataract extraction left eye      cataracts      CATHETERIZATION, HEART, LEFT Left 2014    Performed by Wilman Kim MD at [facility] CATH LAB     SECTION, LOW TRANSVERSE      CORONARY ANGIOPLASTY      Creation, Nephrostomy, Percutaneous Left 2019    Performed by Karina Surgeon at [facility] KARINA    ESOPHAGOGASTRODUODENOSCOPY (EGD) N/A 2016    Performed by Gardenia Adamson MD at [facility] ENDO (4TH FLR)    EXCISION TURBINATE, SUBMUCOUS      FUSION, SPINE, LUMBAR, ANTERIOR APPROACH Left L5-S1 Anterior to Psoa Interbody Fusion, L5-S1 Posterior Instrumentation Left 2019    Performed by Mk George MD at [facility] OR 2ND FLR    HAND SURGERY Left     HAND SURGERY Right     torn ligament repair/ Dr. Yeboah    HYSTERECTOMY      Injection,steroid,epidural,transforaminal approach - Bilateral - S1 Bilateral 2018    Performed by Tl Abreu MD at [facility] PAIN MGT    Injection-steroid-epidural-caudal N/A 2019    Performed by Dave Bentley Jr., MD at [facility] PAIN MGT    INSERTION-INTRAOCULAR LENS (IOL) Right 2015    Performed by Good Domingo MD at [facility] OR 1ST FLR    LAPAROTOMY, EXPLORATORY; LIGATION OF LEFT URETER; DOUBLE J STENT REMOVAL LEFT URETER Left 2019    Performed by Paul Carlson MD at [facility] OR 2ND FLR    left foot surgery      left wrist surgery      NASAL SEPTUM SURGERY  5/7/15    PHACOEMULSIFICATION-ASPIRATION-CATARACT Right 2015    Performed by Good Domingo MD at [facility] OR 1ST FLR    REPAIR, URETER  2019    Performed by Mk George MD at [facility] OR 2ND FLR    RESECTION-TURBINATES (SMR) N/A 2015    Performed by  Dileep Dubois III, MD at [facility] OR [address] [place] [address]    rt elbow surgery      S/P LAD COATED STENT  05/14/2010    6 total     S/P OM1 STENT  08/2003    SEPTOPLASTY N/A 5/7/2015    [facility]    SINUS SURGERY      F.E.S.S.    SINUS SURGERY FUNCTIONAL ENDOSCOPIC WITH NAVIGATION WITH MAXILLARIES, ETHMOIDS, LEFT SPHENOID, LEFT LOLY N/A 5/7/2015    Performed by Dileep Dubois III, MD at [facility] OR [address] [place] [address]    STENT, URETERAL placement  4/12/2019    Performed by Robin Boyd MD at [facility] OR [address] [place] [address]    TUBAL LIGATION         Social History     Socioeconomic History    Marital status:      Spouse name: Shamir    Number of children: 2    Years of education: Not on file    Highest education level: Not on file   Occupational History    Occupation: cafeteria     Employer: [employer]     Employer: [employer]     Employer: [employer]   Social Needs    Financial resource strain: Not on file    Food insecurity:     Worry: Not on file     Inability: Not on file    Transportation needs:     Medical: Not on file     Non-medical: Not on file   Tobacco Use    Smoking status: Never Smoker    Smokeless tobacco: Never Used   Substance and Sexual Activity    Alcohol use: No     Alcohol/week: 0.0 oz    Drug use: No    Sexual activity: Yes     Partners: Male     Birth control/protection: Post-menopausal     Comment:    Lifestyle    Physical activity:     Days per week: Not on file     Minutes per session: Not on file    Stress: Not on file   Relationships    Social connections:     Talks on phone: Not on file     Gets together: Not on file     Attends Latter-day service: Not on file     Active member of club or organization: Not on file     Attends meetings of clubs or organizations: Not on file     Relationship status: Not on file   Other Topics Concern    Are you pregnant or think you may be? No    Breast-feeding No   Social  History Narrative    . 2 children.          Vital Signs Range (Last 24H):  Temp:  [37.1 °C (98.8 °F)-38.7 °C (101.7 °F)]   Pulse:  []   BP: (126-186)/(57-84)   SpO2:  [94 %-100 %]       CBC:   Recent Labs     04/30/19  0300 05/01/19  0344   WBC 13.66* 15.10*   RBC 3.15* 3.80*   HGB 8.6* 10.2*   HCT 28.3* 33.7*    346   MCV 90 89   MCH 27.3 26.8*   MCHC 30.4* 30.3*       CMP:   Recent Labs     04/30/19  0300 05/01/19  0344    138   K 3.9 4.2    99   CO2 29 27   BUN 28* 26*   CREATININE 0.7 0.7   * 229*   MG 1.6 1.5*   PHOS 2.7 2.7   CALCIUM 9.1 9.8   ALBUMIN 1.5* 1.8*   PROT 6.5 7.6   ALKPHOS 198* 218*   ALT 64* 56*   AST 61* 45*   BILITOT 1.7* 2.0*       INR  Recent Labs     04/29/19  0300 04/30/19 0300 05/01/19  0344   INR 0.9 0.9 1.0           Diagnostic Studies: CXR  FINDINGS:  ET tube NG tube and monitoring leads all remain.  Heart size pulmonary vessels are similar.  Lungs are hypoaerated.  Accentuation of the interstitial markings.  No pneumothorax.      Impression       No detrimental change.  Right central venous catheter has been removed.      Electronically signed by: Mk Reilly MD  Date: 05/01/2019  Time: 07:44          EKG:   Vent. Rate : 120 BPM     Atrial Rate : 120 BPM     P-R Int : 140 ms          QRS Dur : 064 ms      QT Int : 340 ms       P-R-T Axes : 035 008 054 degrees     QTc Int : 480 ms    Sinus tachycardia  Otherwise normal ECG  When compared with ECG of 04-APR-2019 11:09,  No significant change was found  Confirmed by Aziza ORTEGA, [facility] ([id number]) on 4/22/2019 12:01:21 AM    2D Echo: 4/22/2019  · Low normal left ventricular systolic function. The estimated ejection fraction is 55%  · Normal LV diastolic function.  · Septal wall has abnormal motion.  · Normal right ventricular systolic function.  · Mild mitral sclerosis.  · Mechanically ventilated; cannot use inferior caval vein diameter to estimate central venous pressure.         Anesthesia  Evaluation    I have reviewed the Patient Summary Reports.    I have reviewed the Nursing Notes.   I have reviewed the Medications.     Review of Systems  Anesthesia Hx:  No problems with previous Anesthesia  History of prior surgery of interest to airway management or planning: Previous anesthesia: General Denies Family Hx of Anesthesia complications.   Denies Personal Hx of Anesthesia complications.   Social:  Non-Smoker, No Alcohol Use    Hematology/Oncology:         -- Anemia:   EENT/Dental:EENT/Dental Normal   Cardiovascular:   Hypertension Past MI CAD    Denies Angina. PVD    Pulmonary:   Asthma Sleep Apnea    Renal/:   Chronic Renal Disease (nephrostomy tube)    Hepatic/GI:   Denies GERD. Liver Disease, (PFEIFFER)    Musculoskeletal:   Arthritis     Neurological:   Denies Seizures.   Peripheral Neuropathy    Endocrine:   Diabetes, type 2        Physical Exam  General:  Well nourished, Obesity    Airway/Jaw/Neck:  Airway Findings: Mouth Opening: Normal Tongue: Normal  Pre-Existing Airway Tube(s): Oral Endotracheal tube  Size: 7.5  General Airway Assessment: Adult  Jaw/Neck Findings:  Neck ROM: Normal ROM     Eyes/Ears/Nose:  EYES/EARS/NOSE FINDINGS: Normal   Dental:  Dental Findings: In tact   Chest/Lungs:  Chest/Lungs Findings: (mechanical breath sounds, intubated) Clear to auscultation     Heart/Vascular:  Heart Findings: Rate: Normal  Rhythm: Regular Rhythm        Mental Status:  Mental Status Findings:  Cooperative, Alert and Oriented         Anesthesia Plan  Type of Anesthesia, risks & benefits discussed:  Anesthesia Type:  general  Patient's Preference:   Intra-op Monitoring Plan: standard ASA monitors  Intra-op Monitoring Plan Comments:   Post Op Pain Control Plan: multimodal analgesia, IV/PO Opioids PRN and per primary service following discharge from PACU  Post Op Pain Control Plan Comments:   Induction:   IV  Beta Blocker:  Patient is not currently on a Beta-Blocker (No further documentation required).        Informed Consent: Patient representative understands risks and agrees with Anesthesia plan.  Questions answered. Anesthesia consent signed with patient representative.  ASA Score: 3     Day of Surgery Review of History & Physical:    H&P update referred to the provider.         Ready For Surgery From Anesthesia Perspective.

## 2019-05-01 NOTE — ASSESSMENT & PLAN NOTE
Mariann Huff is a 59 y.o. female s/p left ureteral injury on 4/12, who presented with fever, AMS, and intraabdominal abscess, taken for washout and ligation of left ureter with nephrostomy tube placement on 4/21. Difficulty weaning from vent, despite diuresis and weaning exercises. Possibly may require tracheostomy in the coming days, family and pt ok with trach if needed.      Neuro:   - Sedated with precedex gtt.   - Pain control with fentanyl prn      Pulmonary:   - Intubated and sedated on precedex gtt.    - Daily CXR, stable  - Will continue diuresis  - ABGs prn   - Will continue to wean, cont SBTs  - Robinol for high secretions     Cardiac:   - HDS at this time. Pt has been weaned off pressors.   - TTE 4/22, EF 55%     Renal:    - S/p transection of left ureter 4/12 and subsequent ligation and PCT nephrostomy tube placement with IR 4/21  - Renal function improving. BUN 30 and Cr 0.8 today.        - Lasix 40 IV BID  - UOP 4.6 L total, net -1.9 L  - Monitor I/Os    Intake/Output Summary (Last 24 hours) at 5/1/2019 0820  Last data filed at 5/1/2019 0600  Gross per 24 hour   Intake 3043 ml   Output 4995 ml   Net -1952 ml        ID:   - Blood cultures 4/12 +E. Coli; sensitivities pending. Repeat Bld Cx show NGTD.   - UCx from 4/20 growing E. Coli; sensitivities are now back. Repeat Cx pending.   - ID following for assistance with abx therapy, added Vanc per their recs on 4/30  - Febrile to 101.7 overnight, White count at 15 today from 13.   - BCx with NGT  - MiniBAL with NGT, no white cells, no organisms  - CT abd/pel obtained 4/30, revealing no intraabdominal process     Hem/Onc:   - H/H stable at this time; cont to monitor     Endocrine:  - DM Type 2 at baseline    - BG >200, will start insulin gtt today       Fluids/Electrolytes/Nutrition/GI:   # Shock Liver          - Resolved           - Labs continue to show improvement          - Elevated LFTs now down trending; AST 47; ALT 84          -  Thrombocytopenia; plts count improving; [id number]           - INR normalized     # Lactic Acidosis          - Now resolved    # Diarrhea          - Pt with multiple episodes of loose watery stool resembling urine noted from rectal tube; 400 ml watery stool overnight          - C.Diff panel Negative          - MADHURI drain also with yellow fluid resembling urine.  Sample sent for Cr analysis, 1.7.      - Maintain TF @ 40 ml/hr  - Start free water flushes 300 cc q6h  - Replace electrolytes PRN        PPx:  - DVT: Lovenox        DISPO:  - Continue SICU care  - Will book patient for Trach/Peg tomorrow, will need to hold TF at midnight        Zoë Du MD CA-1

## 2019-05-01 NOTE — CONSULTS
"[facility]  General Surgery  Consult Note    Patient Name: Mariann Huff  MRN: [id number]  Code Status: Full Code  Admission Date: 4/20/2019  Hospital Length of Stay: 11 days  Attending Physician: Paul Carlson MD  Primary Care Provider: Jasbir Haney MD    Patient information was obtained from patient, past medical records and ER records.     Consults  Subjective:     Principal Problem: Ureteral transection of left native kidney    History of Present Illness:    Ms. Huff is a 58 yo F with PMH of HTN, DMII, CAD, NSTEMI, s/p L5-S1 OLIF with NSGY 4/12 c/b intraoperative left ureteral injury and underwent ureteroureteral anastomoses and ureteral stent placement complicated by sepsis due to E.coli septicemia now s/p ex lap on 4/21 since then patient has been intubated and unable to be weaned off the vent.     General Surgery consulted for PEG and Trach.     On minimal vent settings. Tolerating Tube feeds through OGT.     Current Facility-Administered Medications on File Prior to Encounter   Medication    lidocaine (PF) 10 mg/ml (1%) injection 10 mg     Current Outpatient Medications on File Prior to Encounter   Medication Sig    albuterol (PROVENTIL/VENTOLIN HFA) 90 mcg/actuation inhaler Inhale 2 puffs into the lungs every 6 (six) hours as needed for Wheezing.    amLODIPine (NORVASC) 10 MG tablet TAKE 1 TABLET (10 MG TOTAL) BY MOUTH ONCE DAILY.    aspirin 81 mg Tab Take 81 mg by mouth. 1 Tablet Oral Every day    atorvastatin (LIPITOR) 40 MG tablet TAKE 1 TABLET (40 MG TOTAL) BY MOUTH ONCE DAILY.    ACCU-CHEK EDIN PLUS METER Misc     BD INSULIN PEN NEEDLE UF MINI 31 x 3/16 " Ndle USE 4 TIMES A DAY    blood sugar diagnostic (ACCU-CHEK EDIN PLUS TEST STRP) Strp TEST BLOOD SUGARS 4 TIMES DAILY    chlorthalidone (HYGROTEN) 50 MG Tab Take 1 tablet (50 mg total) by mouth once daily.    cyclobenzaprine (FLEXERIL) 10 MG tablet TAKE 1 TABLET BY MOUTH 3 TIMES A DAY AS NEEDED FOR MUSCLE SPASMS. NO " "WORKING OR DRIVING ON THIS MED    esomeprazole (NEXIUM) 40 MG capsule TAKE 1 CAPSULE (40 MG TOTAL) BY MOUTH BEFORE BREAKFAST.    fenofibrate micronized (LOFIBRA) 134 MG Cap TAKE 1 CAPSULE (134 MG TOTAL) BY MOUTH BEFORE BREAKFAST.    flash glucose scanning reader (FREESTYLE MISTI 14 DAY READER) Misc 1 each by Misc.(Non-Drug; Combo Route) route once daily.    flash glucose sensor (FREESTYLE MISTI 14 DAY SENSOR) Kit 1 each by Misc.(Non-Drug; Combo Route) route once daily.    gabapentin (NEURONTIN) 100 MG capsule Take 2 capsules (200 mg total) by mouth every evening.    insulin aspart U-100 (NOVOLOG FLEXPEN U-100 INSULIN) 100 unit/mL InPn pen INJECT 35 U AM, 45 U AT NOON, 35 U PM.150-200 +2, 201-250 +4, 251-300 +6, 301-350 +8, >350 +10    insulin glargine, TOUJEO, (TOUJEO SOLOSTAR U-300 INSULIN) 300 unit/mL (1.5 mL) InPn pen Inject 50 Units into the skin 2 (two) times daily.    INVOKANA 300 mg Tab tablet Take 1 tablet (300 mg total) by mouth once daily.    irbesartan (AVAPRO) 300 MG tablet Take 1 tablet (300 mg total) by mouth every evening.    lancets 30 gauge Misc     metoprolol succinate (TOPROL-XL) 100 MG 24 hr tablet TAKE 1 TABLET (100 MG TOTAL) BY MOUTH ONCE DAILY.    nitroGLYCERIN (NITROSTAT) 0.4 MG SL tablet Take one every 2-3 min till chestpain relief for 3 times and if still no relief, call MD or come to ED    omega-3 acid ethyl esters (LOVAZA) 1 gram capsule Take 1 g by mouth once daily.     pen needle, diabetic (BD ULTRA-FINE GRABIEL PEN NEEDLES) 32 gauge x 5/32" Ndle For use with insulin pens daily 4 times a day    prasugrel (EFFIENT) 10 mg Tab TAKE 1 TABLET (10 MG TOTAL) BY MOUTH ONCE DAILY.    tamsulosin (FLOMAX) 0.4 mg Cap Take 1 capsule (0.4 mg total) by mouth every evening.    tramadol (ULTRAM) 50 mg tablet Take 1 tablet (50 mg total) by mouth 3 (three) times daily as needed for Pain.    TRULICITY 1.5 mg/0.5 mL PnIj INJECT 1.5 MG INTO THE SKIN EVERY 7 DAYS.    zolpidem (AMBIEN) 5 MG Tab " Take 1 tablet (5 mg total) by mouth nightly as needed.       Review of patient's allergies indicates:  No Known Allergies    Past Medical History:   Diagnosis Date    Anticoagulant long-term use     Asthma     Back pain     CAD (coronary artery disease)     s/p stentimg  (2), (1)    Carotid artery stenosis     Diabetes mellitus type 2 in obese     HTN (hypertension), benign     Hyperlipidemia     Keloid cicatrix     NPDR (nonproliferative diabetic retinopathy) 2015    NSTEMI (non-ST elevated myocardial infarction)     Nuclear sclerosis - Right Eye 3/18/2014    Primary localized osteoarthrosis, lower leg 2014    Sleep apnea      Past Surgical History:   Procedure Laterality Date    CARDIAC CATHETERIZATION      cataract extraction left eye      cataracts      CATHETERIZATION, HEART, LEFT Left 2014    Performed [facility] Wilman Kim [facility] at [facility] CATH LAB     SECTION, LOW TRANSVERSE      CORONARY ANGIOPLASTY      Creation, Nephrostomy, Percutaneous Left 2019    Performed by Karina Surgeon at [facility] KARINA    ESOPHAGOGASTRODUODENOSCOPY (EGD) N/A 2016    Performed by Gardenia Adamson MD at [facility] ENDO (4TH FLR)    EXCISION TURBINATE, SUBMUCOUS      FUSION, SPINE, LUMBAR, ANTERIOR APPROACH Left L5-S1 Anterior to Psoa Interbody Fusion, L5-S1 Posterior Instrumentation Left 2019    Performed by Mk George MD at [facility] OR 2ND FLR    HAND SURGERY Left     HAND SURGERY Right     torn ligament repair/ Dr. Yeboah    HYSTERECTOMY      Injection,steroid,epidural,transforaminal approach - Bilateral - S1 Bilateral 2018    Performed by Tl Abreu MD at [facility] PAIN MGT    Injection-steroid-epidural-caudal N/A 2019    Performed by Dave Bentley Jr., MD at [facility] PAIN MGT    INSERTION-INTRAOCULAR LENS (IOL) Right 2015    Performed by Good Domingo MD at [facility] OR 1ST FLR    LAPAROTOMY, EXPLORATORY; LIGATION OF LEFT URETER; DOUBLE J STENT  REMOVAL LEFT URETER Left 4/20/2019    Performed by [facility]son [facility] at [facility] OR 2ND FLR    left foot surgery      left wrist surgery      NASAL SEPTUM SURGERY  5/7/15    PHACOEMULSIFICATION-ASPIRATION-CATARACT Right 9/1/2015    Performed by Good Domingo MD at [facility] OR 1ST FLR    REPAIR, URETER  4/12/2019    Performed by Mk George MD at [facility] OR 2ND FLR    RESECTION-TURBINATES (SMR) N/A 5/7/2015    Performed by Dileep Dubois III, MD at [facility] OR 2ND FLR    rt elbow surgery      S/P LAD COATED STENT  05/14/2010    6 total     S/P OM1 STENT  08/2003    SEPTOPLASTY N/A 5/7/2015    Performed by Dileep Dubois III, MD at [facility] OR 2ND FLR    SINUS SURGERY      F.E.S.S.    SINUS SURGERY FUNCTIONAL ENDOSCOPIC WITH NAVIGATION WITH MAXILLARIES, ETHMOIDS, LEFT SPHENOID, LEFT LOLY N/A 5/7/2015    Performed by Dileep Dubois III, MD at [facility] OR 2ND FLR    STENT, URETERAL placement  4/12/2019    Performed by Robin Boyd MD at [facility] OR 2ND FLR    TUBAL LIGATION       Family History     Problem Relation (Age of Onset)    Diabetes Mother, Father, Sister, Brother, Sister    Heart attack Father    Heart disease Mother    Leukemia Father    No Known Problems Sister, Brother, Brother, Maternal Grandmother, Maternal Grandfather, Paternal Grandmother, Paternal Grandfather, Son, Son, Maternal Aunt, Maternal Uncle, Paternal Aunt, Paternal Uncle        Tobacco Use    Smoking status: Never Smoker    Smokeless tobacco: Never Used   Substance and Sexual Activity    Alcohol use: No     Alcohol/week: 0.0 oz    Drug use: No    Sexual activity: Yes     Partners: Male     Birth control/protection: Post-menopausal     Comment:      Review of Systems     Negative except above    Objective:     Vital Signs (Most Recent):  Temp: 100.2 °F (37.9 °C) (05/01/19 1100)  Pulse: 76 (05/01/19 1115)  Resp: 16 (04/29/19 0335)  BP: 135/63 (05/01/19 1100)  SpO2: 96 % (05/01/19 1115) Vital Signs (24h Range):  Temp:   "[98.8 °F (37.1 °C)-101.7 °F (38.7 °C)] 100.2 °F (37.9 °C)  Pulse:  [] 76  SpO2:  [94 %-100 %] 96 %  BP: (126-186)/(57-84) 135/63     Weight: 79.5 kg (175 lb 4.3 oz)  Body mass index is 30.08 kg/m².    Physical Exam     Constitutional: She appears well-developed and well-nourished.   Intubated   Cardiovascular: Normal rate and regular rhythm.   Pulmonary/Chest: Intubated, secretions suctioned.    Abdominal:   Midline incision c/d/i.  MADHURI with scant serous drainage.  Abdomen soft, non-distended. Rectal tube with watery brown output.     Nephrostomy tube in place with watery dark yellow output.  Fontenot catheter+   Neurological:   Able to follow commands    Significant Labs:  CBC:   Recent Labs   Lab 05/01/19  0344   WBC 15.10*   RBC 3.80*   HGB 10.2*   HCT 33.7*      MCV 89   MCH 26.8*   MCHC 30.3*     CMP:   Recent Labs   Lab 05/01/19  0344   *   CALCIUM 9.8   ALBUMIN 1.8*   PROT 7.6      K 4.2   CO2 27   CL 99   BUN 26*   CREATININE 0.7   ALKPHOS 218*   ALT 56*   AST 45*   BILITOT 2.0*           Assessment/Plan:     Encounter for weaning from ventilator  Ms. Huff is a 58 yo F with PMH of HTN, DMII, CAD, NSTEMI, s/p L5-S1 OLIF with NSGY 4/12 c/b intraoperative left ureteral injury and underwent ureteroureteral anastomoses and ureteral stent placement complicated by sepsis due to E.coli septicemia now s/p ex lap on 4/21 since then patient has been intubated and unable to be weaned off the vent.     - General surgery consulted for PEG and Trach placement.   - Will proceed with placement tomorrow.   -  very frustrated because surgery is not happening today. Explained is not emergent procedure and she is currently getting tube feeds so we are not able to do it today. He then refused to sign consents until he "calm down".   - Will try later today.   - Thank you for the consult, please call if any questions.         Maura Mcclellan MD  General Surgery PGY V  Beeper: [phone]              "

## 2019-05-01 NOTE — ASSESSMENT & PLAN NOTE
Mariann Huff is a 59 y.o. female s/p left ureteral injury on 4/12, presented with fever, AMS, and intraabdominal abscess, taken for washout and ligation of left ureter with neph tube placement on 4/21    - Management per SICU  - Vent per SICU, continues to require pressure support, possible trach this week  - Original urine cultures with E. Coli, wound culture growing staph lugdeensis on Rocephin per ID recs. Rifampin for hardware. Febrile yesterday, ID re-consulted and vancomycin added  - Maintain correa and neph tube at this time  - Maintain MADHURI drain  - No need for intervention of abdominal wall fluid collection at this time, continue to monitor clincially

## 2019-05-02 ENCOUNTER — ANESTHESIA (OUTPATIENT)
Dept: SURGERY | Facility: HOSPITAL | Age: 59
DRG: 003 | End: 2019-05-02
Payer: MEDICARE

## 2019-05-02 LAB
ABO + RH BLD: NORMAL
ALBUMIN SERPL BCP-MCNC: 1.7 G/DL (ref 3.5–5.2)
ALP SERPL-CCNC: 179 U/L (ref 55–135)
ALT SERPL W/O P-5'-P-CCNC: 42 U/L (ref 10–44)
ANION GAP SERPL CALC-SCNC: 6 MMOL/L (ref 8–16)
AST SERPL-CCNC: 34 U/L (ref 10–40)
BACTERIA BAL AEROBE CULT: NO GROWTH
BASOPHILS # BLD AUTO: 0.05 K/UL (ref 0–0.2)
BASOPHILS NFR BLD: 0.4 % (ref 0–1.9)
BILIRUB SERPL-MCNC: 1.4 MG/DL (ref 0.1–1)
BLD GP AB SCN CELLS X3 SERPL QL: NORMAL
BUN SERPL-MCNC: 26 MG/DL (ref 6–20)
CALCIUM SERPL-MCNC: 9.3 MG/DL (ref 8.7–10.5)
CHLORIDE SERPL-SCNC: 98 MMOL/L (ref 95–110)
CO2 SERPL-SCNC: 31 MMOL/L (ref 23–29)
CREAT SERPL-MCNC: 0.7 MG/DL (ref 0.5–1.4)
DIFFERENTIAL METHOD: ABNORMAL
EOSINOPHIL # BLD AUTO: 0.3 K/UL (ref 0–0.5)
EOSINOPHIL NFR BLD: 2.2 % (ref 0–8)
ERYTHROCYTE [DISTWIDTH] IN BLOOD BY AUTOMATED COUNT: 13.6 % (ref 11.5–14.5)
EST. GFR  (AFRICAN AMERICAN): >60 ML/MIN/1.73 M^2
EST. GFR  (NON AFRICAN AMERICAN): >60 ML/MIN/1.73 M^2
GLUCOSE SERPL-MCNC: 142 MG/DL (ref 70–110)
GRAM STN SPEC: NORMAL
HCT VFR BLD AUTO: 29.4 % (ref 37–48.5)
HGB BLD-MCNC: 9.1 G/DL (ref 12–16)
IMM GRANULOCYTES # BLD AUTO: 0.06 K/UL (ref 0–0.04)
IMM GRANULOCYTES NFR BLD AUTO: 0.5 % (ref 0–0.5)
INR PPP: 0.9 (ref 0.8–1.2)
LYMPHOCYTES # BLD AUTO: 1.5 K/UL (ref 1–4.8)
LYMPHOCYTES NFR BLD: 12.1 % (ref 18–48)
MAGNESIUM SERPL-MCNC: 1.9 MG/DL (ref 1.6–2.6)
MCH RBC QN AUTO: 27.3 PG (ref 27–31)
MCHC RBC AUTO-ENTMCNC: 31 G/DL (ref 32–36)
MCV RBC AUTO: 88 FL (ref 82–98)
MONOCYTES # BLD AUTO: 1.1 K/UL (ref 0.3–1)
MONOCYTES NFR BLD: 8.9 % (ref 4–15)
NEUTROPHILS # BLD AUTO: 9.5 K/UL (ref 1.8–7.7)
NEUTROPHILS NFR BLD: 75.9 % (ref 38–73)
NRBC BLD-RTO: 0 /100 WBC
PHOSPHATE SERPL-MCNC: 2.6 MG/DL (ref 2.7–4.5)
PLATELET # BLD AUTO: 358 K/UL (ref 150–350)
PMV BLD AUTO: 11.9 FL (ref 9.2–12.9)
POCT GLUCOSE: 123 MG/DL (ref 70–110)
POCT GLUCOSE: 129 MG/DL (ref 70–110)
POCT GLUCOSE: 131 MG/DL (ref 70–110)
POCT GLUCOSE: 157 MG/DL (ref 70–110)
POCT GLUCOSE: 164 MG/DL (ref 70–110)
POCT GLUCOSE: 165 MG/DL (ref 70–110)
POCT GLUCOSE: 166 MG/DL (ref 70–110)
POCT GLUCOSE: 168 MG/DL (ref 70–110)
POCT GLUCOSE: 171 MG/DL (ref 70–110)
POCT GLUCOSE: 173 MG/DL (ref 70–110)
POCT GLUCOSE: 182 MG/DL (ref 70–110)
POCT GLUCOSE: 184 MG/DL (ref 70–110)
POCT GLUCOSE: 192 MG/DL (ref 70–110)
POCT GLUCOSE: 196 MG/DL (ref 70–110)
POCT GLUCOSE: 207 MG/DL (ref 70–110)
POCT GLUCOSE: 226 MG/DL (ref 70–110)
POTASSIUM SERPL-SCNC: 4 MMOL/L (ref 3.5–5.1)
PROT SERPL-MCNC: 7 G/DL (ref 6–8.4)
PROTHROMBIN TIME: 9.8 SEC (ref 9–12.5)
RBC # BLD AUTO: 3.33 M/UL (ref 4–5.4)
SODIUM SERPL-SCNC: 135 MMOL/L (ref 136–145)
VANCOMYCIN TROUGH SERPL-MCNC: 23.3 UG/ML (ref 10–22)
WBC # BLD AUTO: 12.55 K/UL (ref 3.9–12.7)

## 2019-05-02 PROCEDURE — 99232 SBSQ HOSP IP/OBS MODERATE 35: CPT | Mod: ,,, | Performed by: PHYSICIAN ASSISTANT

## 2019-05-02 PROCEDURE — 83735 ASSAY OF MAGNESIUM: CPT

## 2019-05-02 PROCEDURE — 25000003 PHARM REV CODE 250: Performed by: STUDENT IN AN ORGANIZED HEALTH CARE EDUCATION/TRAINING PROGRAM

## 2019-05-02 PROCEDURE — 99900026 HC AIRWAY MAINTENANCE (STAT)

## 2019-05-02 PROCEDURE — 63600175 PHARM REV CODE 636 W HCPCS: Performed by: UROLOGY

## 2019-05-02 PROCEDURE — 63600175 PHARM REV CODE 636 W HCPCS: Performed by: STUDENT IN AN ORGANIZED HEALTH CARE EDUCATION/TRAINING PROGRAM

## 2019-05-02 PROCEDURE — D9220A PRA ANESTHESIA: ICD-10-PCS | Mod: ANES,,, | Performed by: ANESTHESIOLOGY

## 2019-05-02 PROCEDURE — 63600175 PHARM REV CODE 636 W HCPCS: Performed by: PHYSICIAN ASSISTANT

## 2019-05-02 PROCEDURE — C9113 INJ PANTOPRAZOLE SODIUM, VIA: HCPCS | Performed by: ANESTHESIOLOGY

## 2019-05-02 PROCEDURE — 99232 PR SUBSEQUENT HOSPITAL CARE,LEVL II: ICD-10-PCS | Mod: ,,, | Performed by: PHYSICIAN ASSISTANT

## 2019-05-02 PROCEDURE — 80053 COMPREHEN METABOLIC PANEL: CPT

## 2019-05-02 PROCEDURE — 25000003 PHARM REV CODE 250: Performed by: NURSE ANESTHETIST, CERTIFIED REGISTERED

## 2019-05-02 PROCEDURE — 43246 PR EGD, FLEX, W/PLCMT, GASTROSTOMY TUBE: ICD-10-PCS | Mod: 51,,, | Performed by: SURGERY

## 2019-05-02 PROCEDURE — 36000706: Performed by: SURGERY

## 2019-05-02 PROCEDURE — 37000009 HC ANESTHESIA EA ADD 15 MINS: Performed by: SURGERY

## 2019-05-02 PROCEDURE — 27201423 OPTIME MED/SURG SUP & DEVICES STERILE SUPPLY: Performed by: SURGERY

## 2019-05-02 PROCEDURE — 31600 PR TRACHEOSTOMY, PLANNED: ICD-10-PCS | Mod: ,,, | Performed by: SURGERY

## 2019-05-02 PROCEDURE — 25000003 PHARM REV CODE 250: Performed by: ANESTHESIOLOGY

## 2019-05-02 PROCEDURE — 20000000 HC ICU ROOM

## 2019-05-02 PROCEDURE — 85610 PROTHROMBIN TIME: CPT

## 2019-05-02 PROCEDURE — 25000003 PHARM REV CODE 250: Performed by: PHYSICIAN ASSISTANT

## 2019-05-02 PROCEDURE — 43246 EGD PLACE GASTROSTOMY TUBE: CPT | Mod: 51,,, | Performed by: SURGERY

## 2019-05-02 PROCEDURE — 63600175 PHARM REV CODE 636 W HCPCS: Performed by: ANESTHESIOLOGY

## 2019-05-02 PROCEDURE — 80202 ASSAY OF VANCOMYCIN: CPT

## 2019-05-02 PROCEDURE — 86850 RBC ANTIBODY SCREEN: CPT

## 2019-05-02 PROCEDURE — 99900035 HC TECH TIME PER 15 MIN (STAT)

## 2019-05-02 PROCEDURE — 27800903 OPTIME MED/SURG SUP & DEVICES OTHER IMPLANTS: Performed by: SURGERY

## 2019-05-02 PROCEDURE — 94003 VENT MGMT INPAT SUBQ DAY: CPT

## 2019-05-02 PROCEDURE — 27200966 HC CLOSED SUCTION SYSTEM

## 2019-05-02 PROCEDURE — 94761 N-INVAS EAR/PLS OXIMETRY MLT: CPT

## 2019-05-02 PROCEDURE — 99233 PR SUBSEQUENT HOSPITAL CARE,LEVL III: ICD-10-PCS | Mod: ,,, | Performed by: NURSE PRACTITIONER

## 2019-05-02 PROCEDURE — 94770 HC EXHALED C02 TEST: CPT

## 2019-05-02 PROCEDURE — D9220A PRA ANESTHESIA: Mod: CRNA,,, | Performed by: NURSE ANESTHETIST, CERTIFIED REGISTERED

## 2019-05-02 PROCEDURE — 27000221 HC OXYGEN, UP TO 24 HOURS

## 2019-05-02 PROCEDURE — D9220A PRA ANESTHESIA: Mod: ANES,,, | Performed by: ANESTHESIOLOGY

## 2019-05-02 PROCEDURE — C1894 INTRO/SHEATH, NON-LASER: HCPCS | Performed by: SURGERY

## 2019-05-02 PROCEDURE — 63600175 PHARM REV CODE 636 W HCPCS: Performed by: NURSE ANESTHETIST, CERTIFIED REGISTERED

## 2019-05-02 PROCEDURE — 37000008 HC ANESTHESIA 1ST 15 MINUTES: Performed by: SURGERY

## 2019-05-02 PROCEDURE — 36000707: Performed by: SURGERY

## 2019-05-02 PROCEDURE — 85025 COMPLETE CBC W/AUTO DIFF WBC: CPT

## 2019-05-02 PROCEDURE — 84100 ASSAY OF PHOSPHORUS: CPT

## 2019-05-02 PROCEDURE — 31600 PLANNED TRACHEOSTOMY: CPT | Mod: ,,, | Performed by: SURGERY

## 2019-05-02 PROCEDURE — 99233 SBSQ HOSP IP/OBS HIGH 50: CPT | Mod: ,,, | Performed by: NURSE PRACTITIONER

## 2019-05-02 PROCEDURE — 25000003 PHARM REV CODE 250: Performed by: UROLOGY

## 2019-05-02 PROCEDURE — 89055 LEUKOCYTE ASSESSMENT FECAL: CPT

## 2019-05-02 PROCEDURE — D9220A PRA ANESTHESIA: ICD-10-PCS | Mod: CRNA,,, | Performed by: NURSE ANESTHETIST, CERTIFIED REGISTERED

## 2019-05-02 RX ORDER — VANCOMYCIN HCL IN 5 % DEXTROSE 1G/250ML
1000 PLASTIC BAG, INJECTION (ML) INTRAVENOUS
Status: DISCONTINUED | OUTPATIENT
Start: 2019-05-02 | End: 2019-05-02

## 2019-05-02 RX ORDER — VANCOMYCIN HCL IN 5 % DEXTROSE 1G/250ML
1000 PLASTIC BAG, INJECTION (ML) INTRAVENOUS
Status: DISCONTINUED | OUTPATIENT
Start: 2019-05-02 | End: 2019-05-07

## 2019-05-02 RX ORDER — ROCURONIUM BROMIDE 10 MG/ML
INJECTION, SOLUTION INTRAVENOUS
Status: DISCONTINUED | OUTPATIENT
Start: 2019-05-02 | End: 2019-05-02

## 2019-05-02 RX ORDER — PROPOFOL 10 MG/ML
VIAL (ML) INTRAVENOUS
Status: DISCONTINUED | OUTPATIENT
Start: 2019-05-02 | End: 2019-05-02

## 2019-05-02 RX ORDER — FENTANYL CITRATE 50 UG/ML
INJECTION, SOLUTION INTRAMUSCULAR; INTRAVENOUS
Status: DISCONTINUED | OUTPATIENT
Start: 2019-05-02 | End: 2019-05-02

## 2019-05-02 RX ORDER — MIDAZOLAM HYDROCHLORIDE 1 MG/ML
INJECTION, SOLUTION INTRAMUSCULAR; INTRAVENOUS
Status: DISCONTINUED | OUTPATIENT
Start: 2019-05-02 | End: 2019-05-02

## 2019-05-02 RX ORDER — PHENYLEPHRINE HYDROCHLORIDE 10 MG/ML
INJECTION INTRAVENOUS
Status: DISCONTINUED | OUTPATIENT
Start: 2019-05-02 | End: 2019-05-02

## 2019-05-02 RX ADMIN — RIFAMPIN 300 MG: 600 INJECTION, POWDER, LYOPHILIZED, FOR SOLUTION INTRAVENOUS at 03:05

## 2019-05-02 RX ADMIN — CEFTRIAXONE 2 G: 2 INJECTION, SOLUTION INTRAVENOUS at 05:05

## 2019-05-02 RX ADMIN — ROCURONIUM BROMIDE 30 MG: 10 INJECTION, SOLUTION INTRAVENOUS at 11:05

## 2019-05-02 RX ADMIN — ONDANSETRON 4 MG: 2 INJECTION INTRAMUSCULAR; INTRAVENOUS at 11:05

## 2019-05-02 RX ADMIN — FUROSEMIDE 40 MG: 10 INJECTION, SOLUTION INTRAMUSCULAR; INTRAVENOUS at 08:05

## 2019-05-02 RX ADMIN — PHENYLEPHRINE HYDROCHLORIDE 200 MCG: 10 INJECTION INTRAVENOUS at 10:05

## 2019-05-02 RX ADMIN — SODIUM CHLORIDE, SODIUM GLUCONATE, SODIUM ACETATE, POTASSIUM CHLORIDE, MAGNESIUM CHLORIDE, SODIUM PHOSPHATE, DIBASIC, AND POTASSIUM PHOSPHATE: .53; .5; .37; .037; .03; .012; .00082 INJECTION, SOLUTION INTRAVENOUS at 10:05

## 2019-05-02 RX ADMIN — PROPOFOL 70 MG: 10 INJECTION, EMULSION INTRAVENOUS at 10:05

## 2019-05-02 RX ADMIN — FUROSEMIDE 40 MG: 10 INJECTION, SOLUTION INTRAMUSCULAR; INTRAVENOUS at 05:05

## 2019-05-02 RX ADMIN — ROCURONIUM BROMIDE 10 MG: 10 INJECTION, SOLUTION INTRAVENOUS at 10:05

## 2019-05-02 RX ADMIN — PHENYLEPHRINE HYDROCHLORIDE 100 MCG: 10 INJECTION INTRAVENOUS at 10:05

## 2019-05-02 RX ADMIN — CHLORHEXIDINE GLUCONATE 0.12% ORAL RINSE 15 ML: 1.2 LIQUID ORAL at 08:05

## 2019-05-02 RX ADMIN — FENTANYL CITRATE 25 MCG: 50 INJECTION, SOLUTION INTRAMUSCULAR; INTRAVENOUS at 11:05

## 2019-05-02 RX ADMIN — RIFAMPIN 300 MG: 600 INJECTION, POWDER, LYOPHILIZED, FOR SOLUTION INTRAVENOUS at 02:05

## 2019-05-02 RX ADMIN — FENTANYL CITRATE 50 MCG: 50 INJECTION INTRAMUSCULAR; INTRAVENOUS at 05:05

## 2019-05-02 RX ADMIN — ROCURONIUM BROMIDE 20 MG: 10 INJECTION, SOLUTION INTRAVENOUS at 11:05

## 2019-05-02 RX ADMIN — PHENYLEPHRINE HYDROCHLORIDE 200 MCG: 10 INJECTION INTRAVENOUS at 11:05

## 2019-05-02 RX ADMIN — CHLORHEXIDINE GLUCONATE 0.12% ORAL RINSE 15 ML: 1.2 LIQUID ORAL at 09:05

## 2019-05-02 RX ADMIN — PANTOPRAZOLE SODIUM 40 MG: 40 INJECTION, POWDER, LYOPHILIZED, FOR SOLUTION INTRAVENOUS at 08:05

## 2019-05-02 RX ADMIN — VANCOMYCIN HYDROCHLORIDE 1000 MG: 1 INJECTION, POWDER, LYOPHILIZED, FOR SOLUTION INTRAVENOUS at 09:05

## 2019-05-02 RX ADMIN — FENTANYL CITRATE 50 MCG: 50 INJECTION INTRAMUSCULAR; INTRAVENOUS at 02:05

## 2019-05-02 RX ADMIN — ROCURONIUM BROMIDE 40 MG: 10 INJECTION, SOLUTION INTRAVENOUS at 10:05

## 2019-05-02 RX ADMIN — DEXMEDETOMIDINE HYDROCHLORIDE 1.4 MCG/KG/HR: 400 INJECTION INTRAVENOUS at 03:05

## 2019-05-02 RX ADMIN — VANCOMYCIN HYDROCHLORIDE 1250 MG: 100 INJECTION, POWDER, LYOPHILIZED, FOR SOLUTION INTRAVENOUS at 08:05

## 2019-05-02 RX ADMIN — MIDAZOLAM HYDROCHLORIDE 2 MG: 1 INJECTION, SOLUTION INTRAMUSCULAR; INTRAVENOUS at 10:05

## 2019-05-02 RX ADMIN — FENTANYL CITRATE 50 MCG: 50 INJECTION INTRAMUSCULAR; INTRAVENOUS at 10:05

## 2019-05-02 RX ADMIN — ENOXAPARIN SODIUM 40 MG: 100 INJECTION SUBCUTANEOUS at 05:05

## 2019-05-02 NOTE — ASSESSMENT & PLAN NOTE
BG goal 140 - 180    Continue IV insulin infusion protocol   Requires intensive BG monitoring while on protocol     Plan to change to transition IV insulin infusion with BG monitoring q 4 hrs once BG stabilizes with consistent infusion rate.     ** Please notify Endocrine for any change and/or advance in diet/TF**  ** Please call Endocrine for any BG related issues **    Discharge Recommendations:     [facility].

## 2019-05-02 NOTE — PROGRESS NOTES
[facility]  General Surgery  Progress Note    Subjective:     History of Present Illness:  Ms. Huff is a 58 yo F with PMH of HTN, DMII, CAD, NSTEMI, s/p L5-S1 OLIF with NSGY  c/b intraoperative left ureteral injury and underwent ureteroureteral anastomoses and ureteral stent placement complicated by sepsis due to E.coli septicemia now s/p ex lap on  since then patient has been intubated and unable to be weaned off the vent.     General Surgery consulted for PEG and Trach.     On minimal vent settings. Tolerating Tube feeds through OGT.     Post-Op Info:  Procedure(s) (LRB):  CREATION, TRACHEOSTOMY (N/A)  BRONCHOSCOPY (N/A)  EGD, WITH PEG TUBE INSERTION   Day of Surgery     Interval History: No acute events overnight  Remains intubated on minimal vent settings    Medications:  Continuous Infusions:   dexmedetomidine (PRECEDEX) infusion 1.4 mcg/kg/hr (19 0900)    insulin (HUMAN R) infusion (adults) 4 Units/hr (19 0900)     Scheduled Meds:   cefTRIAXone (ROCEPHIN) IVPB  2 g Intravenous Q24H    chlorhexidine  15 mL Mouth/Throat BID    enoxaparin  40 mg Subcutaneous Daily    furosemide  40 mg Intravenous BID    pantoprazole  40 mg Intravenous Daily    rifAMpin (RIFADIN) IVPB  300 mg Intravenous Q12H    vancomycin (VANCOCIN) IVPB  1,250 mg Intravenous Q12H     PRN Meds:acetaminophen, albuterol-ipratropium, calcium gluconate IVPB, calcium gluconate IVPB, calcium gluconate IVPB, dextrose 50%, dextrose 50%, [] fentaNYL **FOLLOWED BY** fentaNYL, glycopyrrolate, hydrALAZINE, magnesium sulfate IVPB, magnesium sulfate IVPB, nitroGLYCERIN, ondansetron, potassium chloride in water **AND** potassium chloride in water **AND** potassium chloride in water, promethazine (PHENERGAN) IVPB, sodium chloride 0.9%     Review of patient's allergies indicates:  No Known Allergies  Objective:     Vital Signs (Most Recent):  Temp: (!) 100.5 °F (38.1 °C) (19 0705)  Pulse: 94 (19  0930)  Resp: (!) 25 (05/02/19 0705)  BP: (!) 166/72 (05/02/19 0930)  SpO2: 97 % (05/02/19 0930) Vital Signs (24h Range):  Temp:  [100.1 °F (37.8 °C)-100.6 °F (38.1 °C)] 100.5 °F (38.1 °C)  Pulse:  [] 94  Resp:  [25] 25  SpO2:  [84 %-100 %] 97 %  BP: (110-186)/(55-93) 166/72     Weight: 79.5 kg (175 lb 4.3 oz)  Body mass index is 30.08 kg/m².    Intake/Output - Last 3 Shifts       04/30 0700 - 05/01 0659 05/01 0700 - 05/02 0659 05/02 0700 - 05/03 0659    I.V. (mL/kg) 1233 (15.5) 1584 (19.9)     NG/GT 2010 1420 0    Total Intake(mL/kg) 3243 (40.8) 3004 (37.8) 0 (0)    Urine (mL/kg/hr) 5105 (2.7) 2720 (1.4) 440 (1.8)    Drains 75 38     Stool 250  0    Total Output 5430 2758 440    Net -2187 +246 -440                 Physical Exam   Cardiovascular: Normal rate and regular rhythm.   Pulmonary/Chest:   Vent Mode: PAV+  Oxygen Concentration (%):  (49-95) 59  Resp Rate Total:  (23 br/min-39 br/min) 34 br/min  PEEP/CPAP:  (5 cmH20) 5 cmH20  Mean Airway Pressure:  (6.7 cmH20-9.1 cmH20) 8.2 cmH20   Abdominal: Soft. She exhibits no distension.   Neurological: She is alert.   Following commands   Nursing note and vitals reviewed.      Significant Labs:  Reviewed    Significant Diagnostics:  Reviewed    Assessment/Plan:     Encounter for weaning from ventilator  Ms. Huff is a 58 yo F with PMH of HTN, DMII, CAD, NSTEMI, s/p L5-S1 OLIF with NSGY 4/12 c/b intraoperative left ureteral injury and underwent ureteroureteral anastomoses and ureteral stent placement complicated by sepsis due to E.coli septicemia now s/p ex lap on 4/21 since then patient has been intubated and unable to be weaned off the vent.     - To OR today for PEG/Trach.             Francisco Hall MD  General Surgery  [facility]

## 2019-05-02 NOTE — PROGRESS NOTES
Pharmacokinetic Assessment Follow Up: IV Vancomycin    Vancomycin Regimen Assessment and Plan:    - Patient received a loading dose of 2000 mg once followed by a maintenance dose of vancomycin 1250mg IV every 12 hours.  - Goal serum trough concentration is 15 to 20 mg/dl (clarified with ID PharmD)  - Vancomycin level on 5/2 at 08:00 was 23.3 mg/dl.  This level was drawn approximately 10.5 hours after the previous dose.    - Decrease dose to 1000 mg every 12 hours.   - Next trough due 5/4 at 08:00.     Pharmacy will continue to follow and monitor vancomycin.    Please contact pharmacy at extension [phone] for questions regarding this assessment.    Thank you for the consult,   Marilou Jones, PharmD, [facility]     Patient brief summary:  Mariann Huff is a 59 y.o. female initiated on antimicrobial therapy with IV Vancomycin for treatment of suspected bone/joint.    Drug Allergies:   Review of patient's allergies indicates:  No Known Allergies    Actual Body Weight:   79.5 kg    Renal Function:   Estimated Creatinine Clearance: 89.3 mL/min (based on SCr of 0.7 mg/dL).,     Dialysis Method (if applicable):  Not applicable     CBC (last 72 hours):  Recent Labs   Lab Result Units 04/30/19  0300 05/01/19  0344 05/02/19  0320   WBC K/uL 13.66* 15.10* 12.55   Hemoglobin g/dL 8.6* 10.2* 9.1*   Hematocrit % 28.3* 33.7* 29.4*   Platelets K/uL 274 346 358*   Gran% % 81.6* 82.9* 75.9*   Lymph% % 9.5* 7.9* 12.1*   Mono% % 5.9 6.4 8.9   Eosinophil% % 2.0 1.7 2.2   Basophil% % 0.2 0.4 0.4   Differential Method  Automated Automated Automated       Metabolic Panel (last 72 hours):  Recent Labs   Lab Result Units 04/30/19  0300 05/01/19  0344 05/02/19  0320   Sodium mmol/L 141 138 135*   Potassium mmol/L 3.9 4.2 4.0   Chloride mmol/L 102 99 98   CO2 mmol/L 29 27 31*   Glucose mg/dL 185* 229* 142*   BUN, Bld mg/dL 28* 26* 26*   Creatinine mg/dL 0.7 0.7 0.7   Albumin g/dL 1.5* 1.8* 1.7*   Total Bilirubin mg/dL 1.7* 2.0* 1.4*   Alkaline  Phosphatase U/L 198* 218* 179*   AST U/L 61* 45* 34   ALT U/L 64* 56* 42   Magnesium mg/dL 1.6 1.5* 1.9   Phosphorus mg/dL 2.7 2.7 2.6*       Vancomycin Administrations:  vancomycin given in the last 96 hours                   vancomycin (VANCOCIN) 1250 mg in 5 % dextrose 250 mL IVPB (mg) 1,250 mg New Bag 05/02/19 0809     1,250 mg New Bag 05/01/19 2129     1,250 mg New Bag  0930     1,250 mg New Bag 04/30/19 2029                Drug levels (last 3 results):  Recent Labs   Lab Result Units 05/02/19  0800   Vancomycin-Trough ug/mL 23.3*       Microbiologic Results:  Microbiology Results (last 7 days)     Procedure Component Value Units Date/Time    Blood culture [[id number]] Collected:  04/30/19 0230    Order Status:  Completed Specimen:  Blood from Peripheral, Hand, Right Updated:  05/02/19 0613     Blood Culture, Routine No Growth to date     Blood Culture, Routine No Growth to date     Blood Culture, Routine No Growth to date    Blood culture [[id number]] Collected:  04/30/19 0247    Order Status:  Completed Specimen:  Blood from Peripheral, Wrist, Right Updated:  05/02/19 0613     Blood Culture, Routine No Growth to date     Blood Culture, Routine No Growth to date     Blood Culture, Routine No Growth to date    Clostridium difficile EIA [[id number]] Collected:  05/01/19 1230    Order Status:  Completed Specimen:  Stool Updated:  05/01/19 2110     C. diff Antigen Negative     C difficile Toxins A+B, EIA Negative     Comment: Testing not recommended for children <24 months old.       Culture, VAP (BAL) [[id number]] Collected:  04/30/19 0900    Order Status:  Completed Specimen:  Bronchial Alveolar Lavage from BAL, RUL Updated:  05/01/19 1100     VAP BAL CULTURE No Growth     Gram Stain (Respiratory) <10 epithelial cells per low power field.     Gram Stain (Respiratory) No WBC's     Gram Stain (Respiratory) No organisms seen    Blood culture [[id number]] Collected:  04/24/19 0950    Order Status:  Completed Specimen:   Blood from Peripheral, Wrist, Right Updated:  04/29/19 1412     Blood Culture, Routine No growth after 5 days.    Aerobic culture [[id number]]  (Susceptibility) Collected:  04/21/19 0125    Order Status:  Completed Specimen:  Body Fluid from Abdomen Updated:  04/29/19 0951     Aerobic Bacterial Culture --     STAPHYLOCOCCUS LUGDUNENSIS  Rare      Narrative:       Retroperitoneal fluid    Blood culture [[id number]] Collected:  04/23/19 0430    Order Status:  Completed Specimen:  Blood from Peripheral, Ankle, Right Updated:  04/28/19 0822     Blood Culture, Routine No growth after 5 days.    Narrative:       Please collect with AM labs    Blood culture [[id number]] Collected:  04/23/19 0500    Order Status:  Completed Specimen:  Blood from Peripheral, Antecubital, Right Updated:  04/28/19 0822     Blood Culture, Routine No growth after 5 days.    Narrative:       Please collect with AM labs    Urine culture [[id number]] Collected:  04/25/19 0252    Order Status:  Completed Specimen:  Urine Updated:  04/26/19 1229     Urine Culture, Routine No growth    Narrative:       Preferred Collection Type->Urine, Clean Catch

## 2019-05-02 NOTE — SUBJECTIVE & OBJECTIVE
Interval History: No AEON.  Tmax 101.7, Tcurrent 100.2.  No new complaints.  The patient denies any recent fever, chills, or sweats.      Review of Systems   Reason unable to perform ROS: limited as intubated but answering with head nod.   Constitutional: Negative for activity change, chills, diaphoresis and fever.   Respiratory: Negative for cough, shortness of breath and wheezing.    Cardiovascular: Negative for chest pain.   Gastrointestinal: Negative for abdominal pain, constipation, diarrhea, nausea and vomiting.   Genitourinary: Negative for dysuria, frequency and urgency.   Neurological: Negative for dizziness.   Hematological: Does not bruise/bleed easily.     Objective:     Vital Signs (Most Recent):  Temp: (!) 100.4 °F (38 °C) (05/01/19 1900)  Pulse: 82 (05/01/19 2109)  Resp: 16 (04/29/19 0335)  BP: (!) 149/59 (05/01/19 2100)  SpO2: 100 % (05/01/19 2109) Vital Signs (24h Range):  Temp:  [99.5 °F (37.5 °C)-100.4 °F (38 °C)] 100.4 °F (38 °C)  Pulse:  [] 82  SpO2:  [94 %-100 %] 100 %  BP: (126-186)/(57-84) 149/59     Weight: 79.5 kg (175 lb 4.3 oz)  Body mass index is 30.08 kg/m².    Estimated Creatinine Clearance: 89.3 mL/min (based on SCr of 0.7 mg/dL).    Physical Exam   Constitutional: She is oriented to person, place, and time. She appears well-developed and well-nourished. No distress.       HENT:   Head: Normocephalic and atraumatic.   Cardiovascular: Normal rate, regular rhythm and normal heart sounds. Exam reveals no gallop and no friction rub.   No murmur heard.  Pulmonary/Chest: Effort normal and breath sounds normal. No respiratory distress. She has no wheezes. She has no rales.   Abdominal: Soft. Bowel sounds are normal. She exhibits no distension and no mass. There is no tenderness. There is no rebound and no guarding.   Musculoskeletal: She exhibits no edema.   Neurological: She is alert and oriented to person, place, and time.   Skin: Skin is warm and dry. She is not diaphoretic.    Psychiatric: She has a normal mood and affect. Her behavior is normal.       Significant Labs:   Blood Culture:   Recent Labs   Lab 04/23/19  0430 04/23/19  0500 04/24/19  0950 04/30/19  0230 04/30/19  0247   LABBLOO No growth after 5 days. No growth after 5 days. No growth after 5 days. No Growth to date  No Growth to date No Growth to date  No Growth to date     CBC:   Recent Labs   Lab 04/30/19  0300 05/01/19  0344   WBC 13.66* 15.10*   HGB 8.6* 10.2*   HCT 28.3* 33.7*    346     CMP:   Recent Labs   Lab 04/30/19  0300 05/01/19  0344    138   K 3.9 4.2    99   CO2 29 27   * 229*   BUN 28* 26*   CREATININE 0.7 0.7   CALCIUM 9.1 9.8   PROT 6.5 7.6   ALBUMIN 1.5* 1.8*   BILITOT 1.7* 2.0*   ALKPHOS 198* 218*   AST 61* 45*   ALT 64* 56*   ANIONGAP 10 12   EGFRNONAA >60.0 >60.0     Wound Culture:   Recent Labs   Lab 04/21/19  0125   LABAERO STAPHYLOCOCCUS LUGDUNENSIS  Rare       All pertinent labs within the past 24 hours have been reviewed.    Significant Imaging: I have reviewed all pertinent imaging results/findings within the past 24 hours.   X-Ray Chest 1 View [[id number]] Resulted: 05/01/19 0744   Order Status: Completed Updated: 05/01/19 0747   Narrative:     EXAMINATION:  XR CHEST 1 VIEW    CLINICAL HISTORY:  difficuly weaning the vent, diuresing daily;    TECHNIQUE:  Single frontal view of the chest was performed.    COMPARISON:  April 30, 2019.    FINDINGS:  ET tube NG tube and monitoring leads all remain.  Heart size pulmonary vessels are similar.  Lungs are hypoaerated.  Accentuation of the interstitial markings.  No pneumothorax.   Impression:       No detrimental change.  Right central venous catheter has been removed.      Electronically signed by: Mk Reilly MD  Date: 05/01/2019  Time: 07:44   CT Chest Abdoment Pelvis With Contrast [[id number]] (Abnormal) Resulted: 05/01/19 0445   Order Status: Completed Updated: 05/01/19 0448   Narrative:     EXAMINATION:  CT CHEST  ABDOMEN PELVIS WITH CONTRAST (XPD)    CLINICAL HISTORY:  concern for intraabdominal abscess s/p spinal sugery complicated by ureteral injury;    TECHNIQUE:  Low dose axial images, sagittal and coronal reformations were obtained from the thoracic inlet to the pubic symphysis following the IV administration of 100 mL of Omnipaque 350 .  Oral contrast was not administered.    COMPARISON:  Chest radiograph 04/30/2019.  CT abdomen pelvis 04/20/2019. CT abdomen pelvis 06/03/2014.    FINDINGS:  There is an ET tube with its tip above the barb.  Enteric tube is present with its tip in the mid pylorus.    Chest:    Base of the neck: Left thyroid lobe 1.4 cm hypodense lesion.  Recommend nonemergent evaluation with thyroid ultrasound if not already performed.    Heart and great vessels: Heart is normal in size with minimal pericardial fluid.  Multivessel coronary atherosclerosis.  Aorta is normal in course and caliber.    Adenopathy: Few scattered mediastinal lymph nodes, none meeting enlargement by CT criteria.    Esophagus: Within normal limits.    Airways: ET tube present.    Lungs: Small volume bilateral pleural effusions with associated compressive atelectasis and consolidation in the bilateral lower lobes.    Abdomen:    Liver: Liver is enlarged measuring 22 cm in the craniocaudal direction.  Diffusely decreased attenuation of the hepatic parenchyma, consistent with steatosis.  No focal hepatic lesion.  Portal vein is patent.    Gallbladder: No calcified gallstones.    Biliary system: No intra or extrahepatic biliary ductal dilatation.    Spleen: Hypoattenuating 2.1 cm lesion in the inferior medial aspect of the spleen is not well visualized on prior CT exams, likely an infarct.    Pancreas: No mass or peripancreatic fat stranding.    Adrenals: Within normal limits.    Kidneys: Normal in size and position. There is a left-sided nephrostomy tube present.  Mild fat stranding around the proximal/mid left ureter with  possible surgical clip along its mid course.  Distal left ureter is not well visualized.  There has been resolution of the previously seen periureteral free air.  No hydronephrosis.  There are several bilateral renal hypodensities that are overall stable since CT 06/03/2014 and are favored to represent renal cysts.  Proximal ureters are nondilated.    Bowel: There is a rectal tube present.  Stomach and small bowel unremarkable.  Colon is within normal limits.  No obstruction or inflammatory changes.    Peritoneum: Minimal fluid in the bilateral pericolic gutters.  Mild volume free fluid in the pelvis.  No focal drainable fluid collection.  No pneumoperitoneum.  There is a left lower quadrant surgical drainage catheter with its tip in the left hemiabdomen abutting a loop of small bowel.    Abdominal wall: There are postsurgical and inflammatory changes in the ventral midline abdominal wall with a focal fluid collection in the midline measuring 2.2 x 4.7 x 6.6 cm (AP x TV x CC).  There is mild body wall edema.    Abdominal Adenopathy: None.    Vasculature: No aneurysm.  Scattered calcified and noncalcified atherosclerotic plaque throughout the abdominal aorta and major branch vessels.    Pelvis:    Urinary bladder: Decompressed and contains a Fontenot catheter.    Female reproductive: Status post hysterectomy.    Pelvis adenopathy: None.    Bones: Postoperative changes of L5-S1 posterior spinal fusion with bilateral pedicle screws.  L5-S1 interbody spacer is present and projects anteriorly outside of the disc space, similar to prior CT.    Miscellaneous: None.   Impression:       In this patient who is status post posterior L5-S1 spinal fusion with subsequent left ureteral trauma, there has been resolution of the large air collection within the anterior paraspinous soft tissues and surrounding the left ureter.  There is persistent left periureteral inflammatory change without a focal drainable fluid collection in the  abdomen.  There has been interval placement of a left nephrostomy tube.    Postoperative changes in the ventral abdominal wall with a large focal superficial fluid collection.  This may represent a hematoma/seroma or abscess.    Small volume bilateral pleural effusions with associated compressive atelectasis and bilateral lower lobe consolidation/atelectasis.    Diffuse body wall edema.    Nonspecific wedge-shaped splenic hypodensity in the medial pole, not well seen on prior exams, and favored to represent a small splenic infarct or less likely splenic cyst.    1.4 cm hypodense lesion in the left lobe of the thyroid.  Recommend further nonemergent evaluation with thyroid ultrasound if not already performed.    Several bilateral renal hypodensities that are all stable and are likely cysts.    Hepatomegaly.    Additional stable findings, as above.    This report was flagged in Epic as abnormal.    Electronically signed by resident: Mk Marr  Date: 05/01/2019  Time: 02:15    Electronically signed by: Weston Stephens MD  Date: 05/01/2019  Time: 04:45   X-Ray Chest 1 View [[id number]] Resulted: 04/30/19 0945   Order Status: Completed Updated: 04/30/19 0947   Narrative:     EXAMINATION:  XR CHEST 1 VIEW    CLINICAL HISTORY:  difficuly weaning the vent, diuresing daily;    TECHNIQUE:  Single frontal view of the chest was performed.    COMPARISON:  04/29/2019    FINDINGS:  There is a right neck CVP line with tip projected over SVC.  Endotracheal tube is seen with tip above the level of the barb.  NG tube is seen coursing towards the stomach.  Trachea is patent.  Cardiac silhouette appears unchanged.  There is atherosclerosis.  Lung volumes are symmetric.  Increased pulmonary haziness is suggestive of mild pulmonary edema.  No effusion, pneumothorax or free air below the diaphragm.  No acute osseous abnormality.   Impression:       Findings suggestive of mild pulmonary edema which appears improved from  prior.      Electronically signed by: David Lo MD  Date: 04/30/2019  Time: 09:45   X-Ray Chest 1 View [[id number]] Resulted: 04/29/19 0750   Order Status: Completed Updated: 04/29/19 0753   Narrative:     EXAMINATION:  XR CHEST 1 VIEW    CLINICAL HISTORY:  intubated;    FINDINGS:  Tubes and lines a good position.  Heart size is normal there is moderate edema and no change.   Impression:       See above    Pulmonary edema pneumonia aspiration or sepsis.      Electronically signed by: Mart Norris MD  Date: 04/29/2019  Time: 07:50   X-Ray Chest 1 View [[id number]] Resulted: 04/28/19 0932   Order Status: Completed Updated: 04/28/19 0935   Narrative:     EXAMINATION:  XR CHEST 1 VIEW    CLINICAL HISTORY:  intubated;.    TECHNIQUE:  Single frontal portable view of the chest was performed.    COMPARISON:  April 27, 2019 at 06:29    FINDINGS:  Support devices: ET tube, NG tube, and right internal jugular central venous catheter remain.    There has not been any detrimental change since April 27, 2019 at 06:29.   Impression:       No detrimental change.      Electronically signed by: Sarah Mora MD  Date: 04/28/2019  Time: 09:32   X-Ray Chest 1 View [[id number]] Resulted: 04/27/19 1350   Order Status: Completed Updated: 04/27/19 1352   Narrative:     EXAMINATION:  XR CHEST 1 VIEW    CLINICAL HISTORY:  intubated;.    TECHNIQUE:  Single frontal portable view of the chest was performed.    COMPARISON:  April 26, 2019 at 05:54    FINDINGS:  Support devices: ET tube, NG tube, and right internal jugular central venous catheter remain.    There has not been any detrimental change since April 26, 2019 at 05:54.   Impression:       No detrimental change.      Electronically signed by: Sarah Mora MD  Date: 04/27/2019  Time: 13:50   X-Ray Chest 1 View [[id number]] Resulted: 04/26/19 0741   Order Status: Completed Updated: 04/26/19 0744   Narrative:     EXAMINATION:  XR CHEST 1 VIEW    CLINICAL HISTORY:  Intubated. Eval  pneumonia.;    COMPARISON:  Comparison is made to 04/25/2019.    FINDINGS:  Endotracheal tube tip lies 1.5 cm above the level of the barb.  There has been partial interval clearing of airspace consolidation on both sides since 04/25/2019, with no significant detrimental interval change in the appearance of chest since that time observed.  No pneumothorax.   Impression:       As above      Electronically signed by: Christian Moreno MD  Date: 04/26/2019  Time: 07:41   X-Ray Chest 1 View [[id number]] Resulted: 04/25/19 0656   Order Status: Completed Updated: 04/25/19 0659   Narrative:     EXAMINATION:  XR CHEST 1 VIEW    CLINICAL HISTORY:  Intubated. Eval pneumonia.;    COMPARISON:  Comparison is made to 04/24/2019 and 04/21/2019.    FINDINGS:  Endotracheal tube tip lies 1.5 cm above the barb.  Distal aspect of the enteric tube lies distal to the GE junction, the tube tip projected inferior to the imaged volume.  Right jugular origin vascular catheter is in the superior vena cava.  Heart size and the appearance of the cardiomediastinal silhouette have not changed appreciably since the examinations referenced above.  Pulmonary parenchymal opacity is again noted bilaterally consistent with widespread airspace consolidation.  This appears more pronounced on the current examination than on 04/24/2019, with progressive worsening since 04/21/2019.  Some of the opacity in the inferior hemithoraces on each side may reflect associated pleural fluid.  No pneumothorax.   Impression:       Worsening bilateral airspace consolidation.      Electronically signed by: Christian Moreno MD  Date: 04/25/2019  Time: 06:56

## 2019-05-02 NOTE — ASSESSMENT & PLAN NOTE
Mariann Huff is a 59 y.o. female s/p left ureteral injury on 4/12, presented with fever, AMS, and intraabdominal abscess, taken for washout and ligation of left ureter with neph tube placement on 4/21    - Management per SICU  - NPO today, TF held for trach/ peg with general surgery  - Vent per SICU, continues to require pressure support, trach today  - Original urine cultures with E. Coli, wound culture growing staph lugdeensis on Rocephin per ID recs. Rifampin for hardware. Vancomycin added for recent fevers  - Maintain correa and neph tube at this time  - Maintain MADHURI drain  - No need for intervention of abdominal wall fluid collection at this time, continue to monitor clinically, if worsens then may need to drain, but likely to be seroma

## 2019-05-02 NOTE — PROGRESS NOTES
[facility]  Critical Care - Surgery  Progress Note    Patient Name: Mariann Huff  MRN: [id number]  Admission Date: 4/20/2019  Hospital Length of Stay: 12 days  Code Status: Full Code  Attending Provider: Paul Carlson MD  Primary Care Provider: Jasbir Haney MD   Principal Problem: Ureteral transection of left native kidney    Subjective:     Hospital/ICU Course:  No notes on file    Interval History/Significant Events: patient did well overnight. NPO since midnight for trach today. Pain controlled. On lovenox. UO plus nephrostomy output excellent.   Follow-up For: Procedure(s) (LRB):  Creation, Nephrostomy, Percutaneous (Left)    Post-Operative Day: 10 Days Post-Op    Objective:     Vital Signs (Most Recent):  Temp: (!) 100.5 °F (38.1 °C) (05/02/19 0705)  Pulse: 105 (05/02/19 0800)  Resp: 16 (04/29/19 0335)  BP: (!) 186/82 (05/02/19 0800)  SpO2: 97 % (05/02/19 0800) Vital Signs (24h Range):  Temp:  [100.1 °F (37.8 °C)-100.6 °F (38.1 °C)] 100.5 °F (38.1 °C)  Pulse:  [] 105  SpO2:  [84 %-100 %] 97 %  BP: (110-186)/(55-93) 186/82     Weight: 79.5 kg (175 lb 4.3 oz)  Body mass index is 30.08 kg/m².      Intake/Output Summary (Last 24 hours) at 5/2/2019 0814  Last data filed at 5/2/2019 0800  Gross per 24 hour   Intake 2924 ml   Output 2703 ml   Net 221 ml       Physical Exam   Constitutional: She appears well-developed and well-nourished.   HENT:   Head: Normocephalic and atraumatic.   Intubated   Eyes: Right eye exhibits no discharge. Left eye exhibits no discharge.   Neck: Normal range of motion. Neck supple.   Cardiovascular: Normal rate and regular rhythm.   Pulmonary/Chest: Effort normal. No respiratory distress.   Intubated, secretions suctioned.    Abdominal:   Midline incision c/d/i.  MADHURI with scant serous drainage.  Abdomen soft, non-distended. Rectal tube with watery brown output.     Genitourinary:   Genitourinary Comments: Nephrostomy tube in place with watery dark yellow  output.  Fontenot catheter+   Musculoskeletal: Normal range of motion.   Neurological:   Able to follow commands, denying pain. Lightly sedated on precedex gtt   Skin: Skin is warm and dry.   Vitals reviewed.    Vents:  Vent Mode: PAV+ (05/02/19 0730)  Set Rate: 16 bmp (04/29/19 0911)  Vt Set: 400 mL (04/29/19 0911)  Pressure Support: 0 cmH20 (04/29/19 1014)  PEEP/CPAP: 5 cmH20 (05/02/19 0730)  Oxygen Concentration (%): 59 (05/02/19 0800)  Peak Airway Pressure: 19 cmH2O (05/02/19 0730)  Plateau Pressure: 0 cmH20 (05/02/19 0730)  Total Ve: 9.27 mL (05/02/19 0730)  Negative Inspiratory Force (cm H2O): -18 (04/27/19 1306)  F/VT Ratio<105 (RSBI): (!) 41.78 (04/29/19 0335)    Lines/Drains/Airways     Drain                 Closed/Suction Drain 04/21/19 0300 LLQ 11 days         NG/OG Tube 04/21/19 0728 Center mouth 11 days         Nephrostomy 04/21/19 0629 Left 8 Fr. 11 days         Urethral Catheter 04/20/19 1711 Latex 16 Fr. 11 days         Rectal Tube 04/21/19 2100 rectal tube w/ balloon (indicate number of mLs) 10 days          Airway                 Airway - Non-Surgical 04/21/19 0029 Endotracheal Tube 11 days          Peripheral Intravenous Line                 Peripheral IV - Single Lumen 04/30/19 1505 20 G Left Forearm 1 day         Peripheral IV - Single Lumen 04/30/19 1505 20 G Right Forearm 1 day                Significant Labs:    ABG:  None today    CBC/Anemia Profile:  Recent Labs   Lab 05/01/19  0344 05/02/19  0320   WBC 15.10* 12.55   HGB 10.2* 9.1*   HCT 33.7* 29.4*    358*   MCV 89 88   RDW 13.8 13.6        Chemistries:  Recent Labs   Lab 05/01/19  0344 05/02/19  0320    135*   K 4.2 4.0   CL 99 98   CO2 27 31*   BUN 26* 26*   CREATININE 0.7 0.7   CALCIUM 9.8 9.3   ALBUMIN 1.8* 1.7*   PROT 7.6 7.0   BILITOT 2.0* 1.4*   ALKPHOS 218* 179*   ALT 56* 42   AST 45* 34   MG 1.5* 1.9   PHOS 2.7 2.6*       Significant Imaging:  I have reviewed all pertinent imaging results/findings within the past 24  hours.    Assessment/Plan:     * Ureteral transection of left native kidney    ASSESSMENT/PLAN:   Mariann Huff is a 59 y.o. female s/p left ureteral injury on 4/12, who presented with fever, AMS, and intraabdominal abscess, taken for washout and ligation of left ureter with nephrostomy tube placement on 4/21.     Neuro:   - Sedated with precedex gtt.   - Pain control with fentanyl prn     Pulmonary:   - Intubated and sedated on precedex gtt.    - Daily CXR.  CXR 4/25 shows worsening airspace consolidation b/l.     - Lasix 40 IV given with excellent response  - ABGs prn   - Will put back on PAV+ this AM, see how she does.    Cardiac:   - HDS at this time.  Pt has been weaned off pressors.   - TTE ordered yesterday, EF 55%    Renal:    - S/p transection of left ureter 4/12 and subsequent ligation and PCT nephrostomy tube placement with IR 4/21  - Renal function improving. BUN 26/.7.    - UOP 6 L total  - Monitor I/Os    ID:   - Blood cultures 4/12 +E. Coli; sensitivities pending. Repeat Bld Cx show NGTD.   - UCx from 4/20 growing E. Coli; sensitivities are now back. Repeat Cx pending.   - ID following for assistance with abx therapy, currently on Ceftriaxone and Rifampin. Will need long term antibiotic therapy and ID follow-up.   - White count stable 12.5    Hem/Onc:   - H/H stable at this time; cont to monitor    Endocrine:  - DM Type 2 at baseline    - Endocrine following for assistance with blood glucose control.     Fluids/Electrolytes/Nutrition/GI:   # Shock Liver   - Resolved    - Labs continue to show improvement   - Elevated LFTs now down trending; ;    - Thrombocytopenia; plts count improving; [id number]    - INR normalized to 0.9    # Lactic Acidosis   - Now resolved    # Diarrhea   - Pt with multiple episodes of loose watery stool resembling urine noted from rectal tube; 800 ml watery stool overnight   - C.Diff panel Negative   - Concerns for possible fistula as fluid resembling urine in  color.  Sample from rectal tube to for Cr analysis; awaiting results.    - MADHURI drain also with yellow fluid resembling urine.  Sample sent for Cr analysis, 1.7.     - Increasing TFs  - Start free water flushes 300 cc q6h  - Replace electrolytes PRN      PPx:  - DVT: Lovenox       DISPO:  - Continue SICU care. To OR this am             Critical care was time spent personally by me on the following activities: development of treatment plan with patient or surrogate and bedside caregivers, discussions with consultants, evaluation of patient's response to treatment, examination of patient, ordering and performing treatments and interventions, ordering and review of laboratory studies, ordering and review of radiographic studies, pulse oximetry, re-evaluation of patient's condition.  This critical care time did not overlap with that of any other provider or involve time for any procedures.     Good Hansen MD  Critical Care - Surgery  [facility]

## 2019-05-02 NOTE — PROGRESS NOTES
[facility]  Urology  Progress Note    Patient Name: Mariann Huff  MRN: [id number]  Admission Date: 4/20/2019  Hospital Length of Stay: 12 days  Code Status: Full Code   Attending Provider: Paul Carlson MD   Primary Care Physician: Jasbir Haney MD    Subjective:     HPI:  Mariann Huff is a 59 y.o. female with history of HTN, type 2 diabetes mellitus, CAD, NSTEMI, and back pain who presents to [facility] with altered mental status and sepsis. She underwent L5-S1 OLIF with NSGY on 4/12 and had intraoperative left ureteral injury with ureteroureteral anastomosis and ureteral stent placement. She did well initially and had correa and MADHURI drain removed on 4/16. She began having chills and altered mental status 2 days ago and this has progressively worsened. No complaints of pain.     She is altered and HPI is limited. In the ED she is febrile to 103 and tachycardic and pressures are low normal. WBC is 5, creatinine is 3.6 baseline 1.0, lactate is 4.6. Cath UA concerning for infection, 3+ LE, >100 WBCs, and many bacteria on microscopy.    CT and MRI abdomen both show air with minimal fluid near the surgical site with left ureter coursing through. There is air throughout the proximal collecting system which is decompressed with JJ ureteral stent in good position.    Taken for ex lap, ligation of left ureter and left neph tube placement on 4/21/19.    Interval History:   No acute events overnight  Remains intubated  NPO today for trach/ peg  Alert and responding to questions    Review of Systems    Objective:     Temp:  [100.1 °F (37.8 °C)-100.6 °F (38.1 °C)] 100.3 °F (37.9 °C)  Pulse:  [] 87  SpO2:  [84 %-100 %] 92 %  BP: (110-181)/(55-93) 110/55     Body mass index is 30.08 kg/m².           Drains     Drain                 Closed/Suction Drain 04/21/19 0300 LLQ 10 days         NG/OG Tube 04/21/19 0728 Center mouth 10 days         Nephrostomy 04/21/19 0629 Left 8 Fr. 10 days         Urethral  Catheter 04/20/19 1711 Latex 16 Fr. 10 days         Rectal Tube 04/21/19 2100 rectal tube w/ balloon (indicate number of mLs) 9 days                Physical Exam   Constitutional: She appears well-developed and well-nourished. No distress. She is intubated.   HENT:   Head: Normocephalic and atraumatic.   Neck: No JVD present.   Cardiovascular: Normal rate and regular rhythm.    Pulmonary/Chest: She is intubated.   mechanically ventilated   Abdominal: Soft. She exhibits no distension. There is no rebound and no guarding.   Incision c/d/i   MADHURI drain with serous output   Genitourinary:   Genitourinary Comments: Fontenot draining clear yellow  Left neph tube with clear yellow urine   Neurological: She is alert.   Skin: Skin is warm and dry. She is not diaphoretic. No pallor.       Significant Labs:    BMP:  Recent Labs   Lab 04/30/19  0300 05/01/19  0344 05/02/19  0320    138 135*   K 3.9 4.2 4.0    99 98   CO2 29 27 31*   BUN 28* 26* 26*   CREATININE 0.7 0.7 0.7   CALCIUM 9.1 9.8 9.3       CBC:   Recent Labs   Lab 04/30/19  0300 05/01/19  0344 05/02/19  0320   WBC 13.66* 15.10* 12.55   HGB 8.6* 10.2* 9.1*   HCT 28.3* 33.7* 29.4*    346 358*       All pertinent labs results from the past 24 hours have been reviewed.    Significant Imaging:  All pertinent imaging results/findings from the past 24 hours have been reviewed.      Assessment/Plan:     * Ureteral transection of left native kidney  Elizaamanda Huff is a 59 y.o. female s/p left ureteral injury on 4/12, presented with fever, AMS, and intraabdominal abscess, taken for washout and ligation of left ureter with neph tube placement on 4/21    - Management per SICU  - NPO today, TF held for trach/ peg with general surgery  - Vent per SICU, continues to require pressure support, trach today  - Original urine cultures with E. Coli, wound culture growing staph lugdeensis on Rocephin per ID recs. Rifampin for hardware. Vancomycin added for recent fevers  -  Maintain correa and neph tube at this time  - Maintain MADHURI drain  - No need for intervention of abdominal wall fluid collection at this time, continue to monitor clinically, if worsens then may need to drain, but likely to be seroma    Sepsis  As above        VTE Risk Mitigation (From admission, onward)        Ordered     enoxaparin injection 40 mg  Daily      04/30/19 0722     Place sequential compression device  Until discontinued      04/20/19 2108     IP VTE HIGH RISK PATIENT  Once      04/20/19 2108          Hima Neumann MD  Urology  [facility]

## 2019-05-02 NOTE — PLAN OF CARE
Problem: Spiritual Distress Risk or Actual  Goal: Spiritual Wellbeing    Intervention: Promote Spiritual Wellbeing  F - Mariella and Belief: Taoist. Family relying on prayer for strength.     I - Importance: Yes    C - Community:  and son present.  said they have good support.     A - Address in Care: Introduced and offered pastoral support with prayer. Family made aware of 's presence as needed.

## 2019-05-02 NOTE — PLAN OF CARE
Problem: Adult Inpatient Plan of Care  Goal: Plan of Care Review  Outcome: Ongoing (interventions implemented as appropriate)  Patient free of falls/traumas/injuries. Plan of care discussed with patient and family.  Neuro: Pt AAOx4, follows commands  Cardiac: SR on telemetry with HR in 60-70s, SBP <180  Respiratory: Spontanious Ventilation with Fio2 60% and PEEP of 5.0 to Trach  GI/: Fontenot in place with UOP 20 ml/hr, Nephrostomy in place with UOP 40ml/hr. BMS in place.  Gtts: insulin gtt infusing with Q2H accuchecks.  Plan: Trach and PEG placed today. IV ABX IVPB continued

## 2019-05-02 NOTE — SUBJECTIVE & OBJECTIVE
Interval History: No acute events overnight  Remains intubated on minimal vent settings    Medications:  Continuous Infusions:   dexmedetomidine (PRECEDEX) infusion 1.4 mcg/kg/hr (19)    insulin (HUMAN R) infusion (adults) 4 Units/hr (19)     Scheduled Meds:   cefTRIAXone (ROCEPHIN) IVPB  2 g Intravenous Q24H    chlorhexidine  15 mL Mouth/Throat BID    enoxaparin  40 mg Subcutaneous Daily    furosemide  40 mg Intravenous BID    pantoprazole  40 mg Intravenous Daily    rifAMpin (RIFADIN) IVPB  300 mg Intravenous Q12H    vancomycin (VANCOCIN) IVPB  1,250 mg Intravenous Q12H     PRN Meds:acetaminophen, albuterol-ipratropium, calcium gluconate IVPB, calcium gluconate IVPB, calcium gluconate IVPB, dextrose 50%, dextrose 50%, [] fentaNYL **FOLLOWED BY** fentaNYL, glycopyrrolate, hydrALAZINE, magnesium sulfate IVPB, magnesium sulfate IVPB, nitroGLYCERIN, ondansetron, potassium chloride in water **AND** potassium chloride in water **AND** potassium chloride in water, promethazine (PHENERGAN) IVPB, sodium chloride 0.9%     Review of patient's allergies indicates:  No Known Allergies  Objective:     Vital Signs (Most Recent):  Temp: (!) 100.5 °F (38.1 °C) (19)  Pulse: 94 (19)  Resp: (!) 25 (19)  BP: (!) 166/72 (19)  SpO2: 97 % (19) Vital Signs (24h Range):  Temp:  [100.1 °F (37.8 °C)-100.6 °F (38.1 °C)] 100.5 °F (38.1 °C)  Pulse:  [] 94  Resp:  [25] 25  SpO2:  [84 %-100 %] 97 %  BP: (110-186)/(55-93) 166/72     Weight: 79.5 kg (175 lb 4.3 oz)  Body mass index is 30.08 kg/m².    Intake/Output - Last 3 Shifts       700 -  -  -  0659    I.V. (mL/kg) 1233 (15.5) 1584 (19.9)     NG/GT  1420 0    Total Intake(mL/kg) 3243 (40.8) 3004 (37.8) 0 (0)    Urine (mL/kg/hr) 5105 (2.7) 2720 (1.4) 440 (1.8)    Drains 75 38     Stool 250  0    Total Output 5430 8734 440    Net -6798 +246  -440                 Physical Exam   Cardiovascular: Normal rate and regular rhythm.   Pulmonary/Chest:   Vent Mode: PAV+  Oxygen Concentration (%):  (49-95) 59  Resp Rate Total:  (23 br/min-39 br/min) 34 br/min  PEEP/CPAP:  (5 cmH20) 5 cmH20  Mean Airway Pressure:  (6.7 cmH20-9.1 cmH20) 8.2 cmH20   Abdominal: Soft. She exhibits no distension.   Neurological: She is alert.   Following commands   Nursing note and vitals reviewed.      Significant Labs:  Reviewed    Significant Diagnostics:  Reviewed

## 2019-05-02 NOTE — SUBJECTIVE & OBJECTIVE
"Interval HPI:   Overnight events:   Patient now has tracheostomy. BG is within goal on intensive IV insulin infusion.     Eating:   NPO  Nausea: No  Hypoglycemia and intervention: No  Fever: yes 99.8  TPN and/or TF: No  If yes, type of TF/TPN and rate: None    BP (!) 171/78   Pulse (!) 120   Temp 99.8 °F (37.7 °C) (Oral)   Resp 20   Ht 5' 4" (1.626 m)   Wt 79.5 kg (175 lb 4.3 oz)   SpO2 98%   Breastfeeding? No   BMI 30.08 kg/m²     Labs Reviewed and Include    Recent Labs   Lab 05/02/19  0320   *   CALCIUM 9.3   ALBUMIN 1.7*   PROT 7.0   *   K 4.0   CO2 31*   CL 98   BUN 26*   CREATININE 0.7   ALKPHOS 179*   ALT 42   AST 34   BILITOT 1.4*     Lab Results   Component Value Date    WBC 12.55 05/02/2019    HGB 9.1 (L) 05/02/2019    HCT 29.4 (L) 05/02/2019    MCV 88 05/02/2019     (H) 05/02/2019     No results for input(s): TSH, FREET4 in the last 168 hours.  Lab Results   Component Value Date    HGBA1C 10.8 (H) 02/19/2019       Nutritional status:   Body mass index is 30.08 kg/m².  Lab Results   Component Value Date    ALBUMIN 1.7 (L) 05/02/2019    ALBUMIN 1.8 (L) 05/01/2019    ALBUMIN 1.5 (L) 04/30/2019     No results found for: PREALBUMIN    Estimated Creatinine Clearance: 89.3 mL/min (based on SCr of 0.7 mg/dL).    Accu-Checks  Recent Labs     05/02/19  0217 05/02/19  0319 05/02/19  0527 05/02/19  0559 05/02/19  0718 05/02/19  0807 05/02/19  0957 05/02/19  1118 05/02/19  1151 05/02/19  1257   POCTGLUCOSE 165* 164* 173* 196* 182* 184* 207* 166* 131* 171*       Current Medications and/or Treatments Impacting Glycemic Control  Immunotherapy:    Immunosuppressants     None        Steroids:   Hormones (From admission, onward)    None        Pressors:    Autonomic Drugs (From admission, onward)    None        Hyperglycemia/Diabetes Medications:   Antihyperglycemics (From admission, onward)    Start     Stop Route Frequency Ordered    05/01/19 1015  insulin regular (Humulin R) 100 Units in " sodium chloride 0.9% 100 mL infusion     Question:  Insulin Rate Adjustment (DO NOT MODIFY ANSWER)  Answer:  [email or web address]    -- IV Continuous 05/01/19 0910

## 2019-05-02 NOTE — OP NOTE
DATE: 05/02/2019.    PREOPERATIVE DIAGNOSES:   1. Complications following spinal surgery.  2. Respiratory failure.   3. Dysphagia.    POSTOPERATIVE DIAGNOSES:   1. Complications following spinal surgery.  2. Respiratory failure.   3. Dysphagia.     PROCEDURES PERFORMED:   1. Percutaneous tracheostomy.   2. Bronchoscopy through tracheostomy.  3. Esophagogastroduodenoscopy with percutaneous endoscopic gastrostomy.    ATTENDING SURGEON: Sean Ruano MD .    RESIDENT: Francisco Aldana MD.    ANESTHESIA: General.     INDICATIONS: Mariann Huff is a 59 y.o.female admitted to [facility] following spinal surgery resulting in multiple complications. The patient has failed attempts to wean from the ventilator. We have recommended to the family that the patient would benefit from placement of a percutaneous tracheostomy tube for the anticipated prolonged need for mechanical ventilation. We also recommend a percutaneous gastrostomy tube for the patient's dysphagia. We did obtain informed consent from the patient's family who expressed understanding of the risks and benefits and gave consent to proceed.     PROCEDURE: The patient was taken to the operating room and placed supine. After induction of general endotracheal tube anesthesia, the neck was extended, padded, and was prepped and draped in the standard fashion. Timeout was performed. Tracheal landmarks were identified and a two centimeter midline cervical incision was made. Subcutaneous tissue was bluntly dissected, and appropriate position for the tracheostomy tube was noted. Under bronchoscopic guidance, the endotracheal tube was withdrawn to above the level of tracheostomy. The trachea was cannulated with a hollow bore needle. Aspiration of air confirmed cannulation of the trachea. The guidewire was placed and confirmed within the trachea by bronchoscopy. Under direct visualization, a tracheostomy was created by serial dilation; first with the punch  dilator and then the Blue Rhino dilator. A cuffed #8 Shiley tracheostomy was placed into the trachea by Seldinger technique. The cuff of the tracheostomy tube was inflated and Bronchoscopy through the tracheostomy confirmed appropriate position with visualization of the barb. Thick mucus plugging was aspirated from the bilateral bronchial trees. The endotracheal tube was removed and the tracheostomy tube was secured in place using Velcro tracheostomy tape and 2-0 Prolene sutures.    The patient's abdomen was prepped and draped. An upper endoscope was introduced into the oropharynx and guided down into the esophagus and stomach. The stomach was insufflated with air. The pylorus was intubated and the first and second portions of the duodenum were examined with no abnormalities noted. The endoscope was withdrawn into the stomach. The antrum, body, and fundus were normal without signs of gastritis or ulcer disease. An appropriate position for gastrostomy tube placement 2 finger-breadths below the left subcostal margin was identified. Palpation of the anterior abdominal wall at this point was visualized endoscopically and transillumination from the endoscope was visualized through the anterior abdominal wall. A small skin incision was made and the stomach was cannulated with angiocatheter. A guidewire was placed and was snared using the endoscope. The endoscope, snare, and guidewire were all withdrawn from the patient's mouth. A 20-Cameroonian gastrostomy tube was loaded onto the guidewire and pulled through the anterior abdominal wall via Seldinger technique. Repeat endoscopy was performed with the gastrostomy tube at the 3 cm miya at the skin. There was no blanching of the gastric mucosa, and when the tube was twisted, the button did not grab the mucosa. Gastric insufflation was evacuated, and the endoscope was removed. The esophagus was evaluated during withdrawal and was normal. The gastrostomy tube was secured in place  using the supplied devices and connected to a bag for gravity drainage. The patient was then transferred to the ICU in critical, but stable condition. All needle ans sponge counts were correct at the conclusion of the case. I was present for the procedure in its entirety.    ESTIMATED BLOOD LOSS: 5 mL     FINDINGS: A #8 Shiley tracheostomy and 20-Portuguese percutaneous gastrostomy placed without apparent complication.     SPECIMEN: None.     DRAINS: None.     COMPLICATIONS: None.

## 2019-05-02 NOTE — ASSESSMENT & PLAN NOTE
60 y/o female with HTN, DMII, CAD, NSTEMI, s/p L5-S1 OLIF with NSGY 4/12 c/b intraoperative left ureteral injury and underwent ureteroureteral anastomoses and ureteral stent placement. She was discharged with a correa and MADHURI drain that was removed on 4/16. She was seen by urology and anticipated stent removal in 2-3 months (6/2019).  She presents to ED with AMS and E.coli septicemia found to have air and fluid collection of left anterior prevertebral soft tissue with left ureter involvement. She underwent ex-lap and washout on 4/21. We are consulted for abx recommendations.      Per op note,  There were pocket of purulence noted and evacuated, sent for culture of left retroperitoneum to pole of left kidney. The stent was visible. Posterior to the stent there was a pocket of purulent fluid and exposed hardware along the spinal vertebrae. The tissue along the distal and proximal end of ureter were friable and unhealthy. She underwent left nephrostomy tube placement. The stent was removed and several metal clips were placed on proximal end of the ureteral.     Patient has developed leukocytosis.  OR cultures with Staph Lugdenensis - not currently covered.  Stable and non septic - no obvious source.  Patient does appear to be having diarrhea - C diff negative but suspect may need restesting if WBC increases and no other source found.  QTc long at 480 - considered change to Cipro considered but will add vanc 1st to see if respond to covering staph lugdenensis.  CBC shows no eosinophilia to suggest drug reaction and no rash.    WBC increased and now febrile. Stable non septic.  Repeat BCXs sent and NGTD.  BAL done but no WBC no organisms seen - NGTD.  Source of fever and leukocytosis unclear but ABD suspected though could be non infectious - drug reaction vs PE/DVT.  CT abdomen with superficial fluid collection - possible seroma and inflammatory change around ureter.  C diff repeated and negative.  Line changes jonathan cvc -  removed     Recommendations:  1. Continue ceftriaxone [id number] and rifampin 300 mg q12hr IV (transition to oral once able) given hardware in place.   2. Continue Vancomycin 15mg/kg IV q12 with trough goal 15-24  3. If no improvement may need aspiration of anterior abdominal fluid collection  4. May need to change abx out of concern for drug reaction and assess for other non infectious causes of fever and leukocytosis.  5. Anticipate 6 weeks of IV abx from first negative blood cultures (4/23-6/4/19)  followed by oral abx suppression given retained hardware.   6. Discussed with ID staff

## 2019-05-02 NOTE — BRIEF OP NOTE
[facility]  Brief Operative Note    SUMMARY     Surgery Date: 5/2/2019     Surgeon(s) and Role:     * Sean Ruano MD - Primary     * Francisco Hall MD - Resident - Assisting        Pre-op Diagnosis:  Acute postoperative respiratory failure [J95.821]    Post-op Diagnosis:  Post-Op Diagnosis Codes:     * Acute postoperative respiratory failure [J95.821]    Procedure(s) (LRB):  CREATION, TRACHEOSTOMY (N/A)  BRONCHOSCOPY (N/A)  EGD, WITH PEG TUBE INSERTION    Anesthesia: Choice    Description of Procedure: Percutaneous tracheostomy; PEG tube placement    Description of the findings of the procedure: 8-0 cuffed shiley tracheostomy tube; PEG secured 3cm at the skin    Estimated Blood Loss: Minimal         Specimens:   Specimen (12h ago, onward)    None

## 2019-05-02 NOTE — ANESTHESIA POSTPROCEDURE EVALUATION
Anesthesia Post Evaluation    Patient: Mariann Huff    Procedure(s) Performed: Procedure(s) (LRB):  CREATION, TRACHEOSTOMY (N/A)  BRONCHOSCOPY (N/A)  EGD, WITH PEG TUBE INSERTION    Final Anesthesia Type: general  Patient location during evaluation: PACU  Patient participation: Yes- Able to Participate (trached, but nodded that she had done well with anesthesia)  Level of consciousness: awake and alert and oriented  Post-procedure vital signs: reviewed and stable  Pain management: adequate  Airway patency: patent  PONV status at discharge: No PONV  Anesthetic complications: no      Cardiovascular status: stable  Respiratory status: ventilator (trach)  Hydration status: euvolemic  Follow-up not needed.          Vitals Value Taken Time   /65 5/2/2019  1:31 PM   Temp 37.7 °C (99.8 °F) 5/2/2019 12:15 PM   Pulse 113 5/2/2019  1:34 PM   Resp 20 5/2/2019 12:15 PM   SpO2 100 % 5/2/2019  1:34 PM   Vitals shown include unvalidated device data.      No case tracking events are documented in the log.      Pain/Derick Score: Pain Rating Prior to Med Admin: 10 (5/2/2019  2:54 AM)

## 2019-05-02 NOTE — ASSESSMENT & PLAN NOTE
Ms. Huff is a 58 yo F with PMH of HTN, DMII, CAD, NSTEMI, s/p L5-S1 OLIF with NSGY 4/12 c/b intraoperative left ureteral injury and underwent ureteroureteral anastomoses and ureteral stent placement complicated by sepsis due to E.coli septicemia now s/p ex lap on 4/21 since then patient has been intubated and unable to be weaned off the vent.     - To OR today for PEG/Trach.

## 2019-05-02 NOTE — ASSESSMENT & PLAN NOTE
ASSESSMENT/PLAN:   Mariann Huff is a 59 y.o. female s/p left ureteral injury on 4/12, who presented with fever, AMS, and intraabdominal abscess, taken for washout and ligation of left ureter with nephrostomy tube placement on 4/21.     Neuro:   - Sedated with precedex gtt.   - Pain control with fentanyl prn     Pulmonary:   - Intubated and sedated on precedex gtt.    - Daily CXR.  CXR 4/25 shows worsening airspace consolidation b/l.     - Lasix 40 IV given with excellent response  - ABGs prn   - Will put back on PAV+ this AM, see how she does.    Cardiac:   - HDS at this time.  Pt has been weaned off pressors.   - TTE ordered yesterday, EF 55%    Renal:    - S/p transection of left ureter 4/12 and subsequent ligation and PCT nephrostomy tube placement with IR 4/21  - Renal function improving. BUN 26/.7.    - UOP 6 L total  - Monitor I/Os    ID:   - Blood cultures 4/12 +E. Coli; sensitivities pending. Repeat Bld Cx show NGTD.   - UCx from 4/20 growing E. Coli; sensitivities are now back. Repeat Cx pending.   - ID following for assistance with abx therapy, currently on Ceftriaxone and Rifampin. Will need long term antibiotic therapy and ID follow-up.   - White count stable 12.5    Hem/Onc:   - H/H stable at this time; cont to monitor    Endocrine:  - DM Type 2 at baseline    - Endocrine following for assistance with blood glucose control.     Fluids/Electrolytes/Nutrition/GI:   # Shock Liver   - Resolved    - Labs continue to show improvement   - Elevated LFTs now down trending; ;    - Thrombocytopenia; plts count improving; [id number]    - INR normalized to 0.9    # Lactic Acidosis   - Now resolved    # Diarrhea   - Pt with multiple episodes of loose watery stool resembling urine noted from rectal tube; 800 ml watery stool overnight   - C.Diff panel Negative   - Concerns for possible fistula as fluid resembling urine in color.  Sample from rectal tube to for Cr analysis; awaiting results.    - MADHURI  drain also with yellow fluid resembling urine.  Sample sent for Cr analysis, 1.7.     - Increasing TFs  - Start free water flushes 300 cc q6h  - Replace electrolytes PRN      PPx:  - DVT: Lovenox       DISPO:  - Continue SICU care. To OR this am

## 2019-05-02 NOTE — SUBJECTIVE & OBJECTIVE
Interval History/Significant Events: patient did well overnight. NPO since midnight for trach today. Pain controlled. On lovenox. UO plus nephrostomy output excellent.   Follow-up For: Procedure(s) (LRB):  Creation, Nephrostomy, Percutaneous (Left)    Post-Operative Day: 10 Days Post-Op    Objective:     Vital Signs (Most Recent):  Temp: (!) 100.5 °F (38.1 °C) (05/02/19 0705)  Pulse: 105 (05/02/19 0800)  Resp: 16 (04/29/19 0335)  BP: (!) 186/82 (05/02/19 0800)  SpO2: 97 % (05/02/19 0800) Vital Signs (24h Range):  Temp:  [100.1 °F (37.8 °C)-100.6 °F (38.1 °C)] 100.5 °F (38.1 °C)  Pulse:  [] 105  SpO2:  [84 %-100 %] 97 %  BP: (110-186)/(55-93) 186/82     Weight: 79.5 kg (175 lb 4.3 oz)  Body mass index is 30.08 kg/m².      Intake/Output Summary (Last 24 hours) at 5/2/2019 0814  Last data filed at 5/2/2019 0800  Gross per 24 hour   Intake 2924 ml   Output 2703 ml   Net 221 ml       Physical Exam   Constitutional: She appears well-developed and well-nourished.   HENT:   Head: Normocephalic and atraumatic.   Intubated   Eyes: Right eye exhibits no discharge. Left eye exhibits no discharge.   Neck: Normal range of motion. Neck supple.   Cardiovascular: Normal rate and regular rhythm.   Pulmonary/Chest: Effort normal. No respiratory distress.   Intubated, secretions suctioned.    Abdominal:   Midline incision c/d/i.  MADHURI with scant serous drainage.  Abdomen soft, non-distended. Rectal tube with watery brown output.     Genitourinary:   Genitourinary Comments: Nephrostomy tube in place with watery dark yellow output.  Fontenot catheter+   Musculoskeletal: Normal range of motion.   Neurological:   Able to follow commands, denying pain. Lightly sedated on precedex gtt   Skin: Skin is warm and dry.   Vitals reviewed.    Vents:  Vent Mode: PAV+ (05/02/19 0730)  Set Rate: 16 bmp (04/29/19 0911)  Vt Set: 400 mL (04/29/19 0911)  Pressure Support: 0 cmH20 (04/29/19 1014)  PEEP/CPAP: 5 cmH20 (05/02/19 0730)  Oxygen Concentration  (%): 59 (05/02/19 0800)  Peak Airway Pressure: 19 cmH2O (05/02/19 0730)  Plateau Pressure: 0 cmH20 (05/02/19 0730)  Total Ve: 9.27 mL (05/02/19 0730)  Negative Inspiratory Force (cm H2O): -18 (04/27/19 1306)  F/VT Ratio<105 (RSBI): (!) 41.78 (04/29/19 0335)    Lines/Drains/Airways     Drain                 Closed/Suction Drain 04/21/19 0300 LLQ 11 days         NG/OG Tube 04/21/19 0728 Center mouth 11 days         Nephrostomy 04/21/19 0629 Left 8 Fr. 11 days         Urethral Catheter 04/20/19 1711 Latex 16 Fr. 11 days         Rectal Tube 04/21/19 2100 rectal tube w/ balloon (indicate number of mLs) 10 days          Airway                 Airway - Non-Surgical 04/21/19 0029 Endotracheal Tube 11 days          Peripheral Intravenous Line                 Peripheral IV - Single Lumen 04/30/19 1505 20 G Left Forearm 1 day         Peripheral IV - Single Lumen 04/30/19 1505 20 G Right Forearm 1 day                Significant Labs:    ABG:  None today    CBC/Anemia Profile:  Recent Labs   Lab 05/01/19  0344 05/02/19  0320   WBC 15.10* 12.55   HGB 10.2* 9.1*   HCT 33.7* 29.4*    358*   MCV 89 88   RDW 13.8 13.6        Chemistries:  Recent Labs   Lab 05/01/19  0344 05/02/19  0320    135*   K 4.2 4.0   CL 99 98   CO2 27 31*   BUN 26* 26*   CREATININE 0.7 0.7   CALCIUM 9.8 9.3   ALBUMIN 1.8* 1.7*   PROT 7.6 7.0   BILITOT 2.0* 1.4*   ALKPHOS 218* 179*   ALT 56* 42   AST 45* 34   MG 1.5* 1.9   PHOS 2.7 2.6*       Significant Imaging:  I have reviewed all pertinent imaging results/findings within the past 24 hours.

## 2019-05-02 NOTE — PROGRESS NOTES
"[facility]  Endocrinology  Progress Note    Admit Date: 4/20/2019     Reason for Consult: Management of T2DM, Hyperglycemia     Surgical Procedure and Date:       04/21/2019:         1. Exploratory laparotomy        2. Ligation of left ureter        3. Removal of left JJ ureteral stent      Diabetes diagnosis year: ANDRE    Home Diabetes Medications:  ANDRE  Toujeo 50 BID  Invokana 300mg daily   Novolog 35 units AM, 45 units PM, 35 units PM    How often checking glucose at home? ANDRE   BG readings on regimen: ANDRE  Hypoglycemia on the regimen?  ANDRE  Missed doses on regimen?  ANDRE    Diabetes Complications include:     Hyperglycemia and Diabetic retinopathy     Complicating diabetes co morbidities:   History of MI and MURTAZA, CAD, HLD    HPI:   Patient is a 59 y.o. female with a diagnosis of HTN, HLN, DM type 2, nonproliferative diabetic renopathy, CAD, NSTEMI, and back pain who presents to the ED with a complaint of altered mental status on 04/20/2019. Is now s/p Exploratory laparotomy, Ligation of left ureter, and Removal of left JJ ureteral stent. Endocrinology consulted to manage DM2/Hyperglycemia.    Lab Results   Component Value Date    HGBA1C 10.8 (H) 02/19/2019       Interval HPI:   Overnight events:   Patient now has tracheostomy. BG is within goal on intensive IV insulin infusion.     Eating:   NPO  Nausea: No  Hypoglycemia and intervention: No  Fever: yes 99.8  TPN and/or TF: No  If yes, type of TF/TPN and rate: None    BP (!) 171/78   Pulse (!) 120   Temp 99.8 °F (37.7 °C) (Oral)   Resp 20   Ht 5' 4" (1.626 m)   Wt 79.5 kg (175 lb 4.3 oz)   SpO2 98%   Breastfeeding? No   BMI 30.08 kg/m²      Labs Reviewed and Include    Recent Labs   Lab 05/02/19  0320   *   CALCIUM 9.3   ALBUMIN 1.7*   PROT 7.0   *   K 4.0   CO2 31*   CL 98   BUN 26*   CREATININE 0.7   ALKPHOS 179*   ALT 42   AST 34   BILITOT 1.4*     Lab Results   Component Value Date    WBC 12.55 05/02/2019    HGB 9.1 (L) " 05/02/2019    HCT 29.4 (L) 05/02/2019    MCV 88 05/02/2019     (H) 05/02/2019     No results for input(s): TSH, FREET4 in the last 168 hours.  Lab Results   Component Value Date    HGBA1C 10.8 (H) 02/19/2019       Nutritional status:   Body mass index is 30.08 kg/m².  Lab Results   Component Value Date    ALBUMIN 1.7 (L) 05/02/2019    ALBUMIN 1.8 (L) 05/01/2019    ALBUMIN 1.5 (L) 04/30/2019     No results found for: PREALBUMIN    Estimated Creatinine Clearance: 89.3 mL/min (based on SCr of 0.7 mg/dL).    Accu-Checks  Recent Labs     05/02/19  0217 05/02/19  0319 05/02/19  0527 05/02/19  0559 05/02/19  0718 05/02/19  0807 05/02/19  0957 05/02/19  1118 05/02/19  1151 05/02/19  1257   POCTGLUCOSE 165* 164* 173* 196* 182* 184* 207* 166* 131* 171*       Current Medications and/or Treatments Impacting Glycemic Control  Immunotherapy:    Immunosuppressants     None        Steroids:   Hormones (From admission, onward)    None        Pressors:    Autonomic Drugs (From admission, onward)    None        Hyperglycemia/Diabetes Medications:   Antihyperglycemics (From admission, onward)    Start     Stop Route Frequency Ordered    05/01/19 1015  insulin regular (Humulin R) 100 Units in sodium chloride 0.9% 100 mL infusion     Question:  Insulin Rate Adjustment (DO NOT MODIFY ANSWER)  Answer:  [email or web address]    -- IV Continuous 05/01/19 0910          ASSESSMENT and PLAN    * Ureteral transection of left native kidney  Managed per primary team  Avoid hypoglycemia        Type 2 diabetes mellitus with diabetic peripheral angiopathy without gangrene  BG goal 140 - 180    Continue IV insulin infusion protocol   Requires intensive BG monitoring while on protocol     Plan to change to transition IV insulin infusion with BG monitoring q 4 hrs once BG stabilizes with consistent infusion rate.     ** Please notify Endocrine for any change and/or advance in diet/TF**  **  Please call Endocrine for any BG related issues **    Discharge Recommendations:     TBD.             CAD (coronary artery disease)    Managed per primary team  Condition may cause insulin resistance       PAPA (acute kidney injury)  Caution with insulin stacking        HLD (hyperlipidemia)  Managed per primary team  Condition may cause insulin resistance         Sleep apnea  May affect BG readings if uncontrolled            Uriah Holder NP  Endocrinology  [facility]

## 2019-05-02 NOTE — PROGRESS NOTES
[facility]  Infectious Disease  Progress Note    Patient Name: Mariann Huff  MRN: [id number]  Admission Date: 4/20/2019  Length of Stay: 11 days  Attending Physician: Paul Carlson MD  Primary Care Provider: Jasbir Haney MD    Isolation Status: Special Contact  Assessment/Plan:      Sepsis  60 y/o female with HTN, DMII, CAD, NSTEMI, s/p L5-S1 OLIF with NSGY 4/12 c/b intraoperative left ureteral injury and underwent ureteroureteral anastomoses and ureteral stent placement. She was discharged with a correa and MADHURI drain that was removed on 4/16. She was seen by urology and anticipated stent removal in 2-3 months (6/2019).  She presents to ED with AMS and E.coli septicemia found to have air and fluid collection of left anterior prevertebral soft tissue with left ureter involvement. She underwent ex-lap and washout on 4/21. We are consulted for abx recommendations.      Per op note,  There were pocket of purulence noted and evacuated, sent for culture of left retroperitoneum to pole of left kidney. The stent was visible. Posterior to the stent there was a pocket of purulent fluid and exposed hardware along the spinal vertebrae. The tissue along the distal and proximal end of ureter were friable and unhealthy. She underwent left nephrostomy tube placement. The stent was removed and several metal clips were placed on proximal end of the ureteral.     Patient has developed leukocytosis.  OR cultures with Staph Lugdenensis - not currently covered.  Stable and non septic - no obvious source.  Patient does appear to be having diarrhea - C diff negative but suspect may need restesting if WBC increases and no other source found.  QTc long at 480 - considered change to Cipro considered but will add vanc 1st to see if respond to covering staph lugdenensis.  CBC shows no eosinophilia to suggest drug reaction and no rash.    WBC increased and now febrile. Stable non septic.  Repeat BCXs sent and NGTD.  BAL done  but no WBC no organisms seen - NGTD.  Source of fever and leukocytosis unclear but ABD suspected though could be non infectious - drug reaction vs PE/DVT.  CT abdomen with superficial fluid collection - possible seroma and inflammatory change around ureter.  C diff repeated and negative.  Line changes jonathan cvc - removed     Recommendations:  1. Continue ceftriaxone 8ci21hs and rifampin 300 mg q12hr IV (transition to oral once able) given hardware in place.   2. Continue Vancomycin 15mg/kg IV q12 with trough goal 15-24  3. If no improvement may need aspiration of anterior abdominal fluid collection  4. May need to change abx out of concern for drug reaction and assess for other non infectious causes of fever and leukocytosis.  5. Anticipate 6 weeks of IV abx from first negative blood cultures (4/23-6/4/19)  followed by oral abx suppression given retained hardware.   6. Discussed with ID staff              Anticipated Disposition: tbd    Thank you for your consult. I will follow-up with patient. Please contact us if you have any additional questions.    POLLY Jerome  Infectious Disease  [facility]    Subjective:     Principal Problem:Ureteral transection of left native kidney    HPI: 58 y/o female with HTN, DMII, CAD, NSTEMI, s/p L5-S1 OLIF with NSGY 4/12 sustained intraoperative laceration and underwent ureteroureteral anastomoses and ureteral stent placement. She was discharged with a correa and AMDHURI drain that was removed on 4/16. She was seen by urology and anticipated stent removal in 2-3 months (6/2019).  She presents to ED with AMS and sepsis. She was febrile 103 in ED. WBC 5K on admit. Lactate 4.6. Cath UA with 3+leukocytes, >100 WBcs and many bacteria.     MRI: Postsurgical change of L5-S1 posterior spinal fusion and anterior interbody fusion with a 4.4 cm fluid fluid collection in the left anterior prevertebral soft tissues at the level of the L5-S1 disc space.  Findings may  represent postoperative seroma or evolving hematoma however, urinoma not excluded.  No definite abscess although correlation is advised.    Irregular flow void involving the right common iliac vein, underlying thrombus not excluded.      CT: air collection within the anterior paraspinous soft tissues through which the left ureter courses. Given that the ureter courses through this, communication of the ureter with this collection is not excluded.  There is a ureteral stent within the left ureter.Punctate foci of air in L5-S1.  2. Air within the left renal collecting system, along the left ureter, and within the urinary bladder, correlation advised.    This was not amendable for drainage by IR so she was taken to OR for washout.     She underwent ex-lap of left ureter and left nephrostomy tube placement 4/21/19.   Per op note,  There were pocket of purulence noted and evacuated, sent for culture of left retroperitoneum to pole of left kidney. The stent was visible. Posterior to the stent there was a pocket of purulent fluid and exposed hardware along the spinal vertebrae. The tissue along the distal and proximal end of ureter were friable and unhealthy. She underwent left nephrostomy tube placement. The stent was removed and several metal clips were placed on proximal end of the ureteral. MADHURI drain was placed into left retroperitoneal.     Blood cultures were are positive for E coli..   Urine cultures +E.coli  OR cultures were positive for Staph Ludenensis  She is was treated with zosyn. She is in the ICU, intubated, sedated    ID had seen the patient and there was concern for hardware involvement given proximity and the patient was placed on ceftriaxone and rifampin with plan for 6 weeks of abx.  ID now reconsulted due to fever and leukocytosis.  Patient denies any abdominal pain , fever, chills or sweats.  Repeat Bblood cultures and urine cultures are no growth.    Interval History: No AEON.  Tmax 101.7, Tcurrent  100.2.  No new complaints.  The patient denies any recent fever, chills, or sweats.      Review of Systems   Reason unable to perform ROS: limited as intubated but answering with head nod.   Constitutional: Negative for activity change, chills, diaphoresis and fever.   Respiratory: Negative for cough, shortness of breath and wheezing.    Cardiovascular: Negative for chest pain.   Gastrointestinal: Negative for abdominal pain, constipation, diarrhea, nausea and vomiting.   Genitourinary: Negative for dysuria, frequency and urgency.   Neurological: Negative for dizziness.   Hematological: Does not bruise/bleed easily.     Objective:     Vital Signs (Most Recent):  Temp: (!) 100.4 °F (38 °C) (05/01/19 1900)  Pulse: 82 (05/01/19 2109)  Resp: 16 (04/29/19 0335)  BP: (!) 149/59 (05/01/19 2100)  SpO2: 100 % (05/01/19 2109) Vital Signs (24h Range):  Temp:  [99.5 °F (37.5 °C)-100.4 °F (38 °C)] 100.4 °F (38 °C)  Pulse:  [] 82  SpO2:  [94 %-100 %] 100 %  BP: (126-186)/(57-84) 149/59     Weight: 79.5 kg (175 lb 4.3 oz)  Body mass index is 30.08 kg/m².    Estimated Creatinine Clearance: 89.3 mL/min (based on SCr of 0.7 mg/dL).    Physical Exam   Constitutional: She is oriented to person, place, and time. She appears well-developed and well-nourished. No distress.       HENT:   Head: Normocephalic and atraumatic.   Cardiovascular: Normal rate, regular rhythm and normal heart sounds. Exam reveals no gallop and no friction rub.   No murmur heard.  Pulmonary/Chest: Effort normal and breath sounds normal. No respiratory distress. She has no wheezes. She has no rales.   Abdominal: Soft. Bowel sounds are normal. She exhibits no distension and no mass. There is no tenderness. There is no rebound and no guarding.   Musculoskeletal: She exhibits no edema.   Neurological: She is alert and oriented to person, place, and time.   Skin: Skin is warm and dry. She is not diaphoretic.   Psychiatric: She has a normal mood and affect. Her  behavior is normal.       Significant Labs:   Blood Culture:   Recent Labs   Lab 04/23/19  0430 04/23/19  0500 04/24/19  0950 04/30/19  0230 04/30/19  0247   LABBLOO No growth after 5 days. No growth after 5 days. No growth after 5 days. No Growth to date  No Growth to date No Growth to date  No Growth to date     CBC:   Recent Labs   Lab 04/30/19  0300 05/01/19  0344   WBC 13.66* 15.10*   HGB 8.6* 10.2*   HCT 28.3* 33.7*    346     CMP:   Recent Labs   Lab 04/30/19  0300 05/01/19  0344    138   K 3.9 4.2    99   CO2 29 27   * 229*   BUN 28* 26*   CREATININE 0.7 0.7   CALCIUM 9.1 9.8   PROT 6.5 7.6   ALBUMIN 1.5* 1.8*   BILITOT 1.7* 2.0*   ALKPHOS 198* 218*   AST 61* 45*   ALT 64* 56*   ANIONGAP 10 12   EGFRNONAA >60.0 >60.0     Wound Culture:   Recent Labs   Lab 04/21/19  0125   LABAERO STAPHYLOCOCCUS LUGDUNENSIS  Rare       All pertinent labs within the past 24 hours have been reviewed.    Significant Imaging: I have reviewed all pertinent imaging results/findings within the past 24 hours.   X-Ray Chest 1 View [[id number]] Resulted: 05/01/19 0744   Order Status: Completed Updated: 05/01/19 0747   Narrative:     EXAMINATION:  XR CHEST 1 VIEW    CLINICAL HISTORY:  difficuly weaning the vent, diuresing daily;    TECHNIQUE:  Single frontal view of the chest was performed.    COMPARISON:  April 30, 2019.    FINDINGS:  ET tube NG tube and monitoring leads all remain.  Heart size pulmonary vessels are similar.  Lungs are hypoaerated.  Accentuation of the interstitial markings.  No pneumothorax.   Impression:       No detrimental change.  Right central venous catheter has been removed.      Electronically signed by: Mk Reilly MD  Date: 05/01/2019  Time: 07:44   CT Chest Abdoment Pelvis With Contrast [[id number]] (Abnormal) Resulted: 05/01/19 0445   Order Status: Completed Updated: 05/01/19 0448   Narrative:     EXAMINATION:  CT CHEST ABDOMEN PELVIS WITH CONTRAST (XPD)    CLINICAL  HISTORY:  concern for intraabdominal abscess s/p spinal sugery complicated by ureteral injury;    TECHNIQUE:  Low dose axial images, sagittal and coronal reformations were obtained from the thoracic inlet to the pubic symphysis following the IV administration of 100 mL of Omnipaque 350 .  Oral contrast was not administered.    COMPARISON:  Chest radiograph 04/30/2019.  CT abdomen pelvis 04/20/2019. CT abdomen pelvis 06/03/2014.    FINDINGS:  There is an ET tube with its tip above the barb.  Enteric tube is present with its tip in the mid pylorus.    Chest:    Base of the neck: Left thyroid lobe 1.4 cm hypodense lesion.  Recommend nonemergent evaluation with thyroid ultrasound if not already performed.    Heart and great vessels: Heart is normal in size with minimal pericardial fluid.  Multivessel coronary atherosclerosis.  Aorta is normal in course and caliber.    Adenopathy: Few scattered mediastinal lymph nodes, none meeting enlargement by CT criteria.    Esophagus: Within normal limits.    Airways: ET tube present.    Lungs: Small volume bilateral pleural effusions with associated compressive atelectasis and consolidation in the bilateral lower lobes.    Abdomen:    Liver: Liver is enlarged measuring 22 cm in the craniocaudal direction.  Diffusely decreased attenuation of the hepatic parenchyma, consistent with steatosis.  No focal hepatic lesion.  Portal vein is patent.    Gallbladder: No calcified gallstones.    Biliary system: No intra or extrahepatic biliary ductal dilatation.    Spleen: Hypoattenuating 2.1 cm lesion in the inferior medial aspect of the spleen is not well visualized on prior CT exams, likely an infarct.    Pancreas: No mass or peripancreatic fat stranding.    Adrenals: Within normal limits.    Kidneys: Normal in size and position. There is a left-sided nephrostomy tube present.  Mild fat stranding around the proximal/mid left ureter with possible surgical clip along its mid course.  Distal  left ureter is not well visualized.  There has been resolution of the previously seen periureteral free air.  No hydronephrosis.  There are several bilateral renal hypodensities that are overall stable since CT 06/03/2014 and are favored to represent renal cysts.  Proximal ureters are nondilated.    Bowel: There is a rectal tube present.  Stomach and small bowel unremarkable.  Colon is within normal limits.  No obstruction or inflammatory changes.    Peritoneum: Minimal fluid in the bilateral pericolic gutters.  Mild volume free fluid in the pelvis.  No focal drainable fluid collection.  No pneumoperitoneum.  There is a left lower quadrant surgical drainage catheter with its tip in the left hemiabdomen abutting a loop of small bowel.    Abdominal wall: There are postsurgical and inflammatory changes in the ventral midline abdominal wall with a focal fluid collection in the midline measuring 2.2 x 4.7 x 6.6 cm (AP x TV x CC).  There is mild body wall edema.    Abdominal Adenopathy: None.    Vasculature: No aneurysm.  Scattered calcified and noncalcified atherosclerotic plaque throughout the abdominal aorta and major branch vessels.    Pelvis:    Urinary bladder: Decompressed and contains a Fontenot catheter.    Female reproductive: Status post hysterectomy.    Pelvis adenopathy: None.    Bones: Postoperative changes of L5-S1 posterior spinal fusion with bilateral pedicle screws.  L5-S1 interbody spacer is present and projects anteriorly outside of the disc space, similar to prior CT.    Miscellaneous: None.   Impression:       In this patient who is status post posterior L5-S1 spinal fusion with subsequent left ureteral trauma, there has been resolution of the large air collection within the anterior paraspinous soft tissues and surrounding the left ureter.  There is persistent left periureteral inflammatory change without a focal drainable fluid collection in the abdomen.  There has been interval placement of a left  nephrostomy tube.    Postoperative changes in the ventral abdominal wall with a large focal superficial fluid collection.  This may represent a hematoma/seroma or abscess.    Small volume bilateral pleural effusions with associated compressive atelectasis and bilateral lower lobe consolidation/atelectasis.    Diffuse body wall edema.    Nonspecific wedge-shaped splenic hypodensity in the medial pole, not well seen on prior exams, and favored to represent a small splenic infarct or less likely splenic cyst.    1.4 cm hypodense lesion in the left lobe of the thyroid.  Recommend further nonemergent evaluation with thyroid ultrasound if not already performed.    Several bilateral renal hypodensities that are all stable and are likely cysts.    Hepatomegaly.    Additional stable findings, as above.    This report was flagged in Epic as abnormal.    Electronically signed by resident: Mk Marr  Date: 05/01/2019  Time: 02:15    Electronically signed by: Weston Stephens MD  Date: 05/01/2019  Time: 04:45   X-Ray Chest 1 View [[id number]] Resulted: 04/30/19 0945   Order Status: Completed Updated: 04/30/19 0947   Narrative:     EXAMINATION:  XR CHEST 1 VIEW    CLINICAL HISTORY:  difficuly weaning the vent, diuresing daily;    TECHNIQUE:  Single frontal view of the chest was performed.    COMPARISON:  04/29/2019    FINDINGS:  There is a right neck CVP line with tip projected over SVC.  Endotracheal tube is seen with tip above the level of the barb.  NG tube is seen coursing towards the stomach.  Trachea is patent.  Cardiac silhouette appears unchanged.  There is atherosclerosis.  Lung volumes are symmetric.  Increased pulmonary haziness is suggestive of mild pulmonary edema.  No effusion, pneumothorax or free air below the diaphragm.  No acute osseous abnormality.   Impression:       Findings suggestive of mild pulmonary edema which appears improved from prior.      Electronically signed by: David Lo MD  Date:  04/30/2019  Time: 09:45   X-Ray Chest 1 View [[id number]] Resulted: 04/29/19 0750   Order Status: Completed Updated: 04/29/19 0753   Narrative:     EXAMINATION:  XR CHEST 1 VIEW    CLINICAL HISTORY:  intubated;    FINDINGS:  Tubes and lines a good position.  Heart size is normal there is moderate edema and no change.   Impression:       See above    Pulmonary edema pneumonia aspiration or sepsis.      Electronically signed by: Mart Norris MD  Date: 04/29/2019  Time: 07:50   X-Ray Chest 1 View [[id number]] Resulted: 04/28/19 0932   Order Status: Completed Updated: 04/28/19 0935   Narrative:     EXAMINATION:  XR CHEST 1 VIEW    CLINICAL HISTORY:  intubated;.    TECHNIQUE:  Single frontal portable view of the chest was performed.    COMPARISON:  April 27, 2019 at 06:29    FINDINGS:  Support devices: ET tube, NG tube, and right internal jugular central venous catheter remain.    There has not been any detrimental change since April 27, 2019 at 06:29.   Impression:       No detrimental change.      Electronically signed by: Sarah Mora MD  Date: 04/28/2019  Time: 09:32   X-Ray Chest 1 View [[id number]] Resulted: 04/27/19 1350   Order Status: Completed Updated: 04/27/19 1352   Narrative:     EXAMINATION:  XR CHEST 1 VIEW    CLINICAL HISTORY:  intubated;.    TECHNIQUE:  Single frontal portable view of the chest was performed.    COMPARISON:  April 26, 2019 at 05:54    FINDINGS:  Support devices: ET tube, NG tube, and right internal jugular central venous catheter remain.    There has not been any detrimental change since April 26, 2019 at 05:54.   Impression:       No detrimental change.      Electronically signed by: Sarah Mora MD  Date: 04/27/2019  Time: 13:50   X-Ray Chest 1 View [[id number]] Resulted: 04/26/19 0741   Order Status: Completed Updated: 04/26/19 0744   Narrative:     EXAMINATION:  XR CHEST 1 VIEW    CLINICAL HISTORY:  Intubated. Eval pneumonia.;    COMPARISON:  Comparison is made to  04/25/2019.    FINDINGS:  Endotracheal tube tip lies 1.5 cm above the level of the barb.  There has been partial interval clearing of airspace consolidation on both sides since 04/25/2019, with no significant detrimental interval change in the appearance of chest since that time observed.  No pneumothorax.   Impression:       As above      Electronically signed by: Christian Moreno MD  Date: 04/26/2019  Time: 07:41   X-Ray Chest 1 View [[id number]] Resulted: 04/25/19 0656   Order Status: Completed Updated: 04/25/19 0659   Narrative:     EXAMINATION:  XR CHEST 1 VIEW    CLINICAL HISTORY:  Intubated. Eval pneumonia.;    COMPARISON:  Comparison is made to 04/24/2019 and 04/21/2019.    FINDINGS:  Endotracheal tube tip lies 1.5 cm above the barb.  Distal aspect of the enteric tube lies distal to the GE junction, the tube tip projected inferior to the imaged volume.  Right jugular origin vascular catheter is in the superior vena cava.  Heart size and the appearance of the cardiomediastinal silhouette have not changed appreciably since the examinations referenced above.  Pulmonary parenchymal opacity is again noted bilaterally consistent with widespread airspace consolidation.  This appears more pronounced on the current examination than on 04/24/2019, with progressive worsening since 04/21/2019.  Some of the opacity in the inferior hemithoraces on each side may reflect associated pleural fluid.  No pneumothorax.   Impression:       Worsening bilateral airspace consolidation.      Electronically signed by: Christian Moreno MD  Date: 04/25/2019  Time: 06:56

## 2019-05-02 NOTE — SUBJECTIVE & OBJECTIVE
Interval History:   No acute events overnight  Remains intubated  NPO today for trach/ peg  Alert and responding to questions    Review of Systems    Objective:     Temp:  [100.1 °F (37.8 °C)-100.6 °F (38.1 °C)] 100.3 °F (37.9 °C)  Pulse:  [] 87  SpO2:  [84 %-100 %] 92 %  BP: (110-181)/(55-93) 110/55     Body mass index is 30.08 kg/m².           Drains     Drain                 Closed/Suction Drain 04/21/19 0300 LLQ 10 days         NG/OG Tube 04/21/19 0728 Center mouth 10 days         Nephrostomy 04/21/19 0629 Left 8 Fr. 10 days         Urethral Catheter 04/20/19 1711 Latex 16 Fr. 10 days         Rectal Tube 04/21/19 2100 rectal tube w/ balloon (indicate number of mLs) 9 days                Physical Exam   Constitutional: She appears well-developed and well-nourished. No distress. She is intubated.   HENT:   Head: Normocephalic and atraumatic.   Neck: No JVD present.   Cardiovascular: Normal rate and regular rhythm.    Pulmonary/Chest: She is intubated.   mechanically ventilated   Abdominal: Soft. She exhibits no distension. There is no rebound and no guarding.   Incision c/d/i   MADHURI drain with serous output   Genitourinary:   Genitourinary Comments: Fontenot draining clear yellow  Left neph tube with clear yellow urine   Neurological: She is alert.   Skin: Skin is warm and dry. She is not diaphoretic. No pallor.       Significant Labs:    BMP:  Recent Labs   Lab 04/30/19  0300 05/01/19  0344 05/02/19  0320    138 135*   K 3.9 4.2 4.0    99 98   CO2 29 27 31*   BUN 28* 26* 26*   CREATININE 0.7 0.7 0.7   CALCIUM 9.1 9.8 9.3       CBC:   Recent Labs   Lab 04/30/19  0300 05/01/19  0344 05/02/19  0320   WBC 13.66* 15.10* 12.55   HGB 8.6* 10.2* 9.1*   HCT 28.3* 33.7* 29.4*    346 358*       All pertinent labs results from the past 24 hours have been reviewed.    Significant Imaging:  All pertinent imaging results/findings from the past 24 hours have been reviewed.

## 2019-05-02 NOTE — SUBJECTIVE & OBJECTIVE
Interval History: No AEON.  Tmax/current 100.5.  WBC now wnl.  Had tracheostomy.  The patient denies any recent fever, chills, or sweats.      Review of Systems   Constitutional: Negative for activity change, chills, diaphoresis and fever.   Respiratory: Negative for cough, shortness of breath and wheezing.    Cardiovascular: Negative for chest pain.   Gastrointestinal: Negative for abdominal pain, constipation, diarrhea, nausea and vomiting.   Genitourinary: Negative for dysuria, frequency and urgency.   Neurological: Negative for dizziness.   Hematological: Does not bruise/bleed easily.     Objective:     Vital Signs (Most Recent):  Temp: (!) 100.5 °F (38.1 °C) (05/02/19 0705)  Pulse: 94 (05/02/19 0918)  Resp: 16 (04/29/19 0335)  BP: (!) 156/70 (05/02/19 0900)  SpO2: 99 % (05/02/19 0918) Vital Signs (24h Range):  Temp:  [100.1 °F (37.8 °C)-100.6 °F (38.1 °C)] 100.5 °F (38.1 °C)  Pulse:  [] 94  SpO2:  [84 %-100 %] 99 %  BP: (110-186)/(55-93) 156/70     Weight: 79.5 kg (175 lb 4.3 oz)  Body mass index is 30.08 kg/m².    Estimated Creatinine Clearance: 89.3 mL/min (based on SCr of 0.7 mg/dL).    Physical Exam   Constitutional: She is oriented to person, place, and time. She appears well-developed and well-nourished. No distress.       HENT:   Head: Normocephalic and atraumatic.   Cardiovascular: Normal rate, regular rhythm and normal heart sounds. Exam reveals no gallop and no friction rub.   No murmur heard.  Pulmonary/Chest: Effort normal and breath sounds normal. No respiratory distress. She has no wheezes. She has no rales.   Abdominal: Soft. Bowel sounds are normal. She exhibits no distension and no mass. There is no tenderness. There is no rebound and no guarding.   Musculoskeletal: She exhibits no edema.   Neurological: She is alert and oriented to person, place, and time.   Skin: Skin is warm and dry. She is not diaphoretic.   Psychiatric: She has a normal mood and affect. Her behavior is normal.        Significant Labs:   Blood Culture:   Recent Labs   Lab 04/23/19  0430 04/23/19  0500 04/24/19  0950 04/30/19  0230 04/30/19  0247   LABBLOO No growth after 5 days. No growth after 5 days. No growth after 5 days. No Growth to date  No Growth to date  No Growth to date No Growth to date  No Growth to date  No Growth to date     CBC:   Recent Labs   Lab 05/01/19  0344 05/02/19  0320   WBC 15.10* 12.55   HGB 10.2* 9.1*   HCT 33.7* 29.4*    358*     CMP:   Recent Labs   Lab 05/01/19  0344 05/02/19  0320    135*   K 4.2 4.0   CL 99 98   CO2 27 31*   * 142*   BUN 26* 26*   CREATININE 0.7 0.7   CALCIUM 9.8 9.3   PROT 7.6 7.0   ALBUMIN 1.8* 1.7*   BILITOT 2.0* 1.4*   ALKPHOS 218* 179*   AST 45* 34   ALT 56* 42   ANIONGAP 12 6*   EGFRNONAA >60.0 >60.0     Respiratory Culture:   Recent Labs   Lab 04/30/19  0900   GSRESP <10 epithelial cells per low power field.  No WBC's  No organisms seen     Urine Culture:   Recent Labs   Lab 04/20/19  1711 04/21/19  0640 04/21/19  2256 04/25/19  0252   LABURIN ESCHERICHIA COLI  >100,000 cfu/ml   No growth No significant growth No growth     Urine Studies:   Recent Labs   Lab 04/25/19  0252   COLORU Argelia   APPEARANCEUA Cloudy*   PHUR 5.0   SPECGRAV 1.020   PROTEINUA 1+*   GLUCUA 3+*   KETONESU 1+*   BILIRUBINUA Negative   OCCULTUA 3+*   NITRITE Negative   LEUKOCYTESUR 3+*   RBCUA 25*   WBCUA 55*   BACTERIA Few*   SQUAMEPITHEL 1   HYALINECASTS 0     Wound Culture:   Recent Labs   Lab 04/21/19  0125   LABAERO STAPHYLOCOCCUS LUGDUNENSIS  Rare       All pertinent labs within the past 24 hours have been reviewed.    Significant Imaging: I have reviewed all pertinent imaging results/findings within the past 24 hours.   X-Ray Chest 1 View [[id number]] Resulted: 05/02/19 0806   Order Status: Completed Updated: 05/02/19 0809   Narrative:     EXAMINATION:  XR CHEST 1 VIEW    CLINICAL HISTORY:  difficuly weaning the vent, diuresing daily;    TECHNIQUE:  Single frontal  view of the chest was performed.    COMPARISON:  05/01/2019    FINDINGS:  Endotracheal tube tip is seen at the level of the clavicles.  NG tube is seen coursing towards the stomach.  Trachea is patent.  Cardiac silhouette is normal in size.  There is atherosclerosis.  There has been interval development of bilateral opacities.  There is no effusion, pneumothorax or free air below the diaphragm.  There is no acute osseous abnormality.   Impression:       Interval development of multifocal bilateral opacities that could represent multifocal infection or pulmonary edema.      Electronically signed by: David Lo MD  Date: 05/02/2019  Time: 08:06   X-Ray Chest 1 View [[id number]] Resulted: 05/01/19 0744   Order Status: Completed Updated: 05/01/19 0747   Narrative:     EXAMINATION:  XR CHEST 1 VIEW    CLINICAL HISTORY:  difficuly weaning the vent, diuresing daily;    TECHNIQUE:  Single frontal view of the chest was performed.    COMPARISON:  April 30, 2019.    FINDINGS:  ET tube NG tube and monitoring leads all remain.  Heart size pulmonary vessels are similar.  Lungs are hypoaerated.  Accentuation of the interstitial markings.  No pneumothorax.   Impression:       No detrimental change.  Right central venous catheter has been removed.      Electronically signed by: Mk Reilly MD  Date: 05/01/2019  Time: 07:44   CT Chest Abdoment Pelvis With Contrast [[id number]] (Abnormal) Resulted: 05/01/19 0445   Order Status: Completed Updated: 05/01/19 0448   Narrative:     EXAMINATION:  CT CHEST ABDOMEN PELVIS WITH CONTRAST (XPD)    CLINICAL HISTORY:  concern for intraabdominal abscess s/p spinal sugery complicated by ureteral injury;    TECHNIQUE:  Low dose axial images, sagittal and coronal reformations were obtained from the thoracic inlet to the pubic symphysis following the IV administration of 100 mL of Omnipaque 350 .  Oral contrast was not administered.    COMPARISON:  Chest radiograph 04/30/2019.  CT abdomen pelvis  04/20/2019. CT abdomen pelvis 06/03/2014.    FINDINGS:  There is an ET tube with its tip above the barb.  Enteric tube is present with its tip in the mid pylorus.    Chest:    Base of the neck: Left thyroid lobe 1.4 cm hypodense lesion.  Recommend nonemergent evaluation with thyroid ultrasound if not already performed.    Heart and great vessels: Heart is normal in size with minimal pericardial fluid.  Multivessel coronary atherosclerosis.  Aorta is normal in course and caliber.    Adenopathy: Few scattered mediastinal lymph nodes, none meeting enlargement by CT criteria.    Esophagus: Within normal limits.    Airways: ET tube present.    Lungs: Small volume bilateral pleural effusions with associated compressive atelectasis and consolidation in the bilateral lower lobes.    Abdomen:    Liver: Liver is enlarged measuring 22 cm in the craniocaudal direction.  Diffusely decreased attenuation of the hepatic parenchyma, consistent with steatosis.  No focal hepatic lesion.  Portal vein is patent.    Gallbladder: No calcified gallstones.    Biliary system: No intra or extrahepatic biliary ductal dilatation.    Spleen: Hypoattenuating 2.1 cm lesion in the inferior medial aspect of the spleen is not well visualized on prior CT exams, likely an infarct.    Pancreas: No mass or peripancreatic fat stranding.    Adrenals: Within normal limits.    Kidneys: Normal in size and position. There is a left-sided nephrostomy tube present.  Mild fat stranding around the proximal/mid left ureter with possible surgical clip along its mid course.  Distal left ureter is not well visualized.  There has been resolution of the previously seen periureteral free air.  No hydronephrosis.  There are several bilateral renal hypodensities that are overall stable since CT 06/03/2014 and are favored to represent renal cysts.  Proximal ureters are nondilated.    Bowel: There is a rectal tube present.  Stomach and small bowel unremarkable.  Colon is  within normal limits.  No obstruction or inflammatory changes.    Peritoneum: Minimal fluid in the bilateral pericolic gutters.  Mild volume free fluid in the pelvis.  No focal drainable fluid collection.  No pneumoperitoneum.  There is a left lower quadrant surgical drainage catheter with its tip in the left hemiabdomen abutting a loop of small bowel.    Abdominal wall: There are postsurgical and inflammatory changes in the ventral midline abdominal wall with a focal fluid collection in the midline measuring 2.2 x 4.7 x 6.6 cm (AP x TV x CC).  There is mild body wall edema.    Abdominal Adenopathy: None.    Vasculature: No aneurysm.  Scattered calcified and noncalcified atherosclerotic plaque throughout the abdominal aorta and major branch vessels.    Pelvis:    Urinary bladder: Decompressed and contains a Fontenot catheter.    Female reproductive: Status post hysterectomy.    Pelvis adenopathy: None.    Bones: Postoperative changes of L5-S1 posterior spinal fusion with bilateral pedicle screws.  L5-S1 interbody spacer is present and projects anteriorly outside of the disc space, similar to prior CT.    Miscellaneous: None.   Impression:       In this patient who is status post posterior L5-S1 spinal fusion with subsequent left ureteral trauma, there has been resolution of the large air collection within the anterior paraspinous soft tissues and surrounding the left ureter.  There is persistent left periureteral inflammatory change without a focal drainable fluid collection in the abdomen.  There has been interval placement of a left nephrostomy tube.    Postoperative changes in the ventral abdominal wall with a large focal superficial fluid collection.  This may represent a hematoma/seroma or abscess.    Small volume bilateral pleural effusions with associated compressive atelectasis and bilateral lower lobe consolidation/atelectasis.    Diffuse body wall edema.    Nonspecific wedge-shaped splenic hypodensity in the  medial pole, not well seen on prior exams, and favored to represent a small splenic infarct or less likely splenic cyst.    1.4 cm hypodense lesion in the left lobe of the thyroid.  Recommend further nonemergent evaluation with thyroid ultrasound if not already performed.    Several bilateral renal hypodensities that are all stable and are likely cysts.    Hepatomegaly.    Additional stable findings, as above.    This report was flagged in Epic as abnormal.    Electronically signed by resident: Mk Marr  Date: 05/01/2019  Time: 02:15    Electronically signed by: Weston Stephens MD  Date: 05/01/2019  Time: 04:45   X-Ray Chest 1 View [[id number]] Resulted: 04/30/19 0945   Order Status: Completed Updated: 04/30/19 0947   Narrative:     EXAMINATION:  XR CHEST 1 VIEW    CLINICAL HISTORY:  difficuly weaning the vent, diuresing daily;    TECHNIQUE:  Single frontal view of the chest was performed.    COMPARISON:  04/29/2019    FINDINGS:  There is a right neck CVP line with tip projected over SVC.  Endotracheal tube is seen with tip above the level of the barb.  NG tube is seen coursing towards the stomach.  Trachea is patent.  Cardiac silhouette appears unchanged.  There is atherosclerosis.  Lung volumes are symmetric.  Increased pulmonary haziness is suggestive of mild pulmonary edema.  No effusion, pneumothorax or free air below the diaphragm.  No acute osseous abnormality.   Impression:       Findings suggestive of mild pulmonary edema which appears improved from prior.      Electronically signed by: David Lo MD  Date: 04/30/2019  Time: 09:45   X-Ray Chest 1 View [[id number]] Resulted: 04/29/19 0750   Order Status: Completed Updated: 04/29/19 0753   Narrative:     EXAMINATION:  XR CHEST 1 VIEW    CLINICAL HISTORY:  intubated;    FINDINGS:  Tubes and lines a good position.  Heart size is normal there is moderate edema and no change.   Impression:       See above    Pulmonary edema pneumonia aspiration or  sepsis.      Electronically signed by: Mart Norris MD  Date: 04/29/2019  Time: 07:50   X-Ray Chest 1 View [[id number]] Resulted: 04/28/19 0932   Order Status: Completed Updated: 04/28/19 0935   Narrative:     EXAMINATION:  XR CHEST 1 VIEW    CLINICAL HISTORY:  intubated;.    TECHNIQUE:  Single frontal portable view of the chest was performed.    COMPARISON:  April 27, 2019 at 06:29    FINDINGS:  Support devices: ET tube, NG tube, and right internal jugular central venous catheter remain.    There has not been any detrimental change since April 27, 2019 at 06:29.   Impression:       No detrimental change.      Electronically signed by: Sarah Mora MD  Date: 04/28/2019  Time: 09:32   X-Ray Chest 1 View [[id number]] Resulted: 04/27/19 1350   Order Status: Completed Updated: 04/27/19 1352   Narrative:     EXAMINATION:  XR CHEST 1 VIEW    CLINICAL HISTORY:  intubated;.    TECHNIQUE:  Single frontal portable view of the chest was performed.    COMPARISON:  April 26, 2019 at 05:54    FINDINGS:  Support devices: ET tube, NG tube, and right internal jugular central venous catheter remain.    There has not been any detrimental change since April 26, 2019 at 05:54.   Impression:       No detrimental change.      Electronically signed by: Sarah Mora MD  Date: 04/27/2019  Time: 13:50   X-Ray Chest 1 View [[id number]] Resulted: 04/26/19 0741   Order Status: Completed Updated: 04/26/19 0744   Narrative:     EXAMINATION:  XR CHEST 1 VIEW    CLINICAL HISTORY:  Intubated. Eval pneumonia.;    COMPARISON:  Comparison is made to 04/25/2019.    FINDINGS:  Endotracheal tube tip lies 1.5 cm above the level of the barb.  There has been partial interval clearing of airspace consolidation on both sides since 04/25/2019, with no significant detrimental interval change in the appearance of chest since that time observed.  No pneumothorax.   Impression:       As above      Electronically signed by: Christian Moreno MD  Date:  04/26/2019  Time: 07:41

## 2019-05-02 NOTE — TRANSFER OF CARE
"Anesthesia Transfer of Care Note    Patient: Mariann Huff    Procedure(s) Performed: Procedure(s) (LRB):  CREATION, TRACHEOSTOMY (N/A)  BRONCHOSCOPY (N/A)  EGD, WITH PEG TUBE INSERTION    Patient location: ICU    Anesthesia Type: general    Transport from OR: Transported from OR intubated on 100% O2 by AMBU with assisted ventilation. Continuous ECG monitoring in transport. Continuous SpO2 monitoring in transport    Post pain: adequate analgesia    Post assessment: no apparent anesthetic complications and tolerated procedure well    Post vital signs: stable    Level of consciousness: sedated    Nausea/Vomiting: no nausea/vomiting    Complications: none    Transfer of care protocol was followed      Last vitals:   Visit Vitals  BP (!) 150/67 (BP Location: Right arm, Patient Position: Lying)   Pulse 91   Temp (!) 38.1 °C (100.5 °F) (Oral)   Resp (!) 22   Ht 5' 4" (1.626 m)   Wt 79.5 kg (175 lb 4.3 oz)   SpO2 97%   Breastfeeding? No   BMI 30.08 kg/m²     "

## 2019-05-02 NOTE — NURSING
Pt returned to room. Connected to ICU monitor, VS remained stable, Notified MD of all VS, gtts, and outputs. See flowsheet for details. Bed low and locked, call light within reach, Will continue to monitor closely.

## 2019-05-03 LAB
ALBUMIN SERPL BCP-MCNC: 1.7 G/DL (ref 3.5–5.2)
ALP SERPL-CCNC: 160 U/L (ref 55–135)
ALT SERPL W/O P-5'-P-CCNC: 33 U/L (ref 10–44)
ANION GAP SERPL CALC-SCNC: 12 MMOL/L (ref 8–16)
APTT BLDCRRT: 27.8 SEC (ref 21–32)
AST SERPL-CCNC: 30 U/L (ref 10–40)
BASOPHILS # BLD AUTO: 0.04 K/UL (ref 0–0.2)
BASOPHILS # BLD AUTO: 0.06 K/UL (ref 0–0.2)
BASOPHILS # BLD AUTO: 0.08 K/UL (ref 0–0.2)
BASOPHILS NFR BLD: 0.3 % (ref 0–1.9)
BASOPHILS NFR BLD: 0.5 % (ref 0–1.9)
BASOPHILS NFR BLD: 0.7 % (ref 0–1.9)
BILIRUB SERPL-MCNC: 1.6 MG/DL (ref 0.1–1)
BUN SERPL-MCNC: 23 MG/DL (ref 6–20)
CALCIUM SERPL-MCNC: 9.1 MG/DL (ref 8.7–10.5)
CHLORIDE SERPL-SCNC: 98 MMOL/L (ref 95–110)
CO2 SERPL-SCNC: 29 MMOL/L (ref 23–29)
CREAT SERPL-MCNC: 0.8 MG/DL (ref 0.5–1.4)
DIFFERENTIAL METHOD: ABNORMAL
EOSINOPHIL # BLD AUTO: 0.2 K/UL (ref 0–0.5)
EOSINOPHIL # BLD AUTO: 0.3 K/UL (ref 0–0.5)
EOSINOPHIL # BLD AUTO: 0.4 K/UL (ref 0–0.5)
EOSINOPHIL NFR BLD: 1.3 % (ref 0–8)
EOSINOPHIL NFR BLD: 2.7 % (ref 0–8)
EOSINOPHIL NFR BLD: 2.9 % (ref 0–8)
ERYTHROCYTE [DISTWIDTH] IN BLOOD BY AUTOMATED COUNT: 13.2 % (ref 11.5–14.5)
ERYTHROCYTE [DISTWIDTH] IN BLOOD BY AUTOMATED COUNT: 13.4 % (ref 11.5–14.5)
ERYTHROCYTE [DISTWIDTH] IN BLOOD BY AUTOMATED COUNT: 13.7 % (ref 11.5–14.5)
EST. GFR  (AFRICAN AMERICAN): >60 ML/MIN/1.73 M^2
EST. GFR  (NON AFRICAN AMERICAN): >60 ML/MIN/1.73 M^2
GLUCOSE SERPL-MCNC: 155 MG/DL (ref 70–110)
HCT VFR BLD AUTO: 25.4 % (ref 37–48.5)
HCT VFR BLD AUTO: 26.9 % (ref 37–48.5)
HCT VFR BLD AUTO: 27.1 % (ref 37–48.5)
HGB BLD-MCNC: 8.1 G/DL (ref 12–16)
HGB BLD-MCNC: 8.3 G/DL (ref 12–16)
HGB BLD-MCNC: 8.6 G/DL (ref 12–16)
IMM GRANULOCYTES # BLD AUTO: 0.07 K/UL (ref 0–0.04)
IMM GRANULOCYTES # BLD AUTO: 0.07 K/UL (ref 0–0.04)
IMM GRANULOCYTES # BLD AUTO: 0.09 K/UL (ref 0–0.04)
IMM GRANULOCYTES NFR BLD AUTO: 0.6 % (ref 0–0.5)
IMM GRANULOCYTES NFR BLD AUTO: 0.6 % (ref 0–0.5)
IMM GRANULOCYTES NFR BLD AUTO: 0.7 % (ref 0–0.5)
INR PPP: 1 (ref 0.8–1.2)
INR PPP: 1.1 (ref 0.8–1.2)
LYMPHOCYTES # BLD AUTO: 1.3 K/UL (ref 1–4.8)
LYMPHOCYTES NFR BLD: 10.3 % (ref 18–48)
LYMPHOCYTES NFR BLD: 10.4 % (ref 18–48)
LYMPHOCYTES NFR BLD: 11.3 % (ref 18–48)
MAGNESIUM SERPL-MCNC: 1.7 MG/DL (ref 1.6–2.6)
MCH RBC QN AUTO: 27.1 PG (ref 27–31)
MCH RBC QN AUTO: 27.1 PG (ref 27–31)
MCH RBC QN AUTO: 27.4 PG (ref 27–31)
MCHC RBC AUTO-ENTMCNC: 30.9 G/DL (ref 32–36)
MCHC RBC AUTO-ENTMCNC: 31.7 G/DL (ref 32–36)
MCHC RBC AUTO-ENTMCNC: 31.9 G/DL (ref 32–36)
MCV RBC AUTO: 85 FL (ref 82–98)
MCV RBC AUTO: 86 FL (ref 82–98)
MCV RBC AUTO: 88 FL (ref 82–98)
MONOCYTES # BLD AUTO: 1.3 K/UL (ref 0.3–1)
MONOCYTES # BLD AUTO: 1.3 K/UL (ref 0.3–1)
MONOCYTES # BLD AUTO: 1.4 K/UL (ref 0.3–1)
MONOCYTES NFR BLD: 10.5 % (ref 4–15)
MONOCYTES NFR BLD: 10.8 % (ref 4–15)
MONOCYTES NFR BLD: 11.8 % (ref 4–15)
NEUTROPHILS # BLD AUTO: 8.8 K/UL (ref 1.8–7.7)
NEUTROPHILS # BLD AUTO: 9 K/UL (ref 1.8–7.7)
NEUTROPHILS # BLD AUTO: 9 K/UL (ref 1.8–7.7)
NEUTROPHILS NFR BLD: 73.9 % (ref 38–73)
NEUTROPHILS NFR BLD: 75 % (ref 38–73)
NEUTROPHILS NFR BLD: 75.7 % (ref 38–73)
NRBC BLD-RTO: 0 /100 WBC
PHOSPHATE SERPL-MCNC: 2.9 MG/DL (ref 2.7–4.5)
PLATELET # BLD AUTO: 492 K/UL (ref 150–350)
PLATELET # BLD AUTO: 498 K/UL (ref 150–350)
PLATELET # BLD AUTO: ABNORMAL K/UL (ref 150–350)
PLATELET BLD QL SMEAR: ABNORMAL
PMV BLD AUTO: 10.6 FL (ref 9.2–12.9)
PMV BLD AUTO: 10.7 FL (ref 9.2–12.9)
PMV BLD AUTO: 11.8 FL (ref 9.2–12.9)
POCT GLUCOSE: 150 MG/DL (ref 70–110)
POCT GLUCOSE: 168 MG/DL (ref 70–110)
POCT GLUCOSE: 168 MG/DL (ref 70–110)
POCT GLUCOSE: 172 MG/DL (ref 70–110)
POCT GLUCOSE: 173 MG/DL (ref 70–110)
POCT GLUCOSE: 176 MG/DL (ref 70–110)
POTASSIUM SERPL-SCNC: 3.7 MMOL/L (ref 3.5–5.1)
PROT SERPL-MCNC: 6.8 G/DL (ref 6–8.4)
PROTHROMBIN TIME: 10.7 SEC (ref 9–12.5)
PROTHROMBIN TIME: 10.8 SEC (ref 9–12.5)
RBC # BLD AUTO: 2.99 M/UL (ref 4–5.4)
RBC # BLD AUTO: 3.06 M/UL (ref 4–5.4)
RBC # BLD AUTO: 3.14 M/UL (ref 4–5.4)
SODIUM SERPL-SCNC: 139 MMOL/L (ref 136–145)
WBC # BLD AUTO: 11.57 K/UL (ref 3.9–12.7)
WBC # BLD AUTO: 12.02 K/UL (ref 3.9–12.7)
WBC # BLD AUTO: 12.22 K/UL (ref 3.9–12.7)
WBC #/AREA STL HPF: NORMAL /[HPF]

## 2019-05-03 PROCEDURE — 85610 PROTHROMBIN TIME: CPT | Mod: 91

## 2019-05-03 PROCEDURE — 84100 ASSAY OF PHOSPHORUS: CPT

## 2019-05-03 PROCEDURE — 25000003 PHARM REV CODE 250: Performed by: UROLOGY

## 2019-05-03 PROCEDURE — 63600175 PHARM REV CODE 636 W HCPCS: Performed by: STUDENT IN AN ORGANIZED HEALTH CARE EDUCATION/TRAINING PROGRAM

## 2019-05-03 PROCEDURE — 99233 PR SUBSEQUENT HOSPITAL CARE,LEVL III: ICD-10-PCS | Mod: 24,GC,, | Performed by: SURGERY

## 2019-05-03 PROCEDURE — C1751 CATH, INF, PER/CENT/MIDLINE: HCPCS

## 2019-05-03 PROCEDURE — 76937 US GUIDE VASCULAR ACCESS: CPT

## 2019-05-03 PROCEDURE — 27000221 HC OXYGEN, UP TO 24 HOURS

## 2019-05-03 PROCEDURE — 99233 SBSQ HOSP IP/OBS HIGH 50: CPT | Mod: 24,GC,, | Performed by: SURGERY

## 2019-05-03 PROCEDURE — 94761 N-INVAS EAR/PLS OXIMETRY MLT: CPT

## 2019-05-03 PROCEDURE — 63600175 PHARM REV CODE 636 W HCPCS: Performed by: ANESTHESIOLOGY

## 2019-05-03 PROCEDURE — 83735 ASSAY OF MAGNESIUM: CPT

## 2019-05-03 PROCEDURE — 63600175 PHARM REV CODE 636 W HCPCS: Performed by: PHYSICIAN ASSISTANT

## 2019-05-03 PROCEDURE — 94003 VENT MGMT INPAT SUBQ DAY: CPT

## 2019-05-03 PROCEDURE — 25000003 PHARM REV CODE 250: Performed by: NURSE PRACTITIONER

## 2019-05-03 PROCEDURE — 99900035 HC TECH TIME PER 15 MIN (STAT)

## 2019-05-03 PROCEDURE — 63600175 PHARM REV CODE 636 W HCPCS: Performed by: NURSE PRACTITIONER

## 2019-05-03 PROCEDURE — 85610 PROTHROMBIN TIME: CPT

## 2019-05-03 PROCEDURE — 20000000 HC ICU ROOM

## 2019-05-03 PROCEDURE — 36410 VNPNXR 3YR/> PHY/QHP DX/THER: CPT

## 2019-05-03 PROCEDURE — 80053 COMPREHEN METABOLIC PANEL: CPT

## 2019-05-03 PROCEDURE — C9113 INJ PANTOPRAZOLE SODIUM, VIA: HCPCS | Performed by: ANESTHESIOLOGY

## 2019-05-03 PROCEDURE — 27200966 HC CLOSED SUCTION SYSTEM

## 2019-05-03 PROCEDURE — 63600175 PHARM REV CODE 636 W HCPCS: Performed by: UROLOGY

## 2019-05-03 PROCEDURE — 99233 SBSQ HOSP IP/OBS HIGH 50: CPT | Mod: ,,, | Performed by: PHYSICIAN ASSISTANT

## 2019-05-03 PROCEDURE — 99900026 HC AIRWAY MAINTENANCE (STAT)

## 2019-05-03 PROCEDURE — 99232 PR SUBSEQUENT HOSPITAL CARE,LEVL II: ICD-10-PCS | Mod: ,,, | Performed by: NURSE PRACTITIONER

## 2019-05-03 PROCEDURE — 85025 COMPLETE CBC W/AUTO DIFF WBC: CPT

## 2019-05-03 PROCEDURE — 99232 SBSQ HOSP IP/OBS MODERATE 35: CPT | Mod: ,,, | Performed by: NURSE PRACTITIONER

## 2019-05-03 PROCEDURE — 99233 PR SUBSEQUENT HOSPITAL CARE,LEVL III: ICD-10-PCS | Mod: ,,, | Performed by: PHYSICIAN ASSISTANT

## 2019-05-03 PROCEDURE — 25000003 PHARM REV CODE 250: Performed by: PHYSICIAN ASSISTANT

## 2019-05-03 PROCEDURE — 85730 THROMBOPLASTIN TIME PARTIAL: CPT

## 2019-05-03 RX ORDER — IBUPROFEN 200 MG
24 TABLET ORAL
Status: DISCONTINUED | OUTPATIENT
Start: 2019-05-03 | End: 2019-05-05

## 2019-05-03 RX ORDER — INSULIN ASPART 100 [IU]/ML
3-6 INJECTION, SOLUTION INTRAVENOUS; SUBCUTANEOUS
Status: DISCONTINUED | OUTPATIENT
Start: 2019-05-03 | End: 2019-05-04

## 2019-05-03 RX ORDER — INSULIN ASPART 100 [IU]/ML
0-5 INJECTION, SOLUTION INTRAVENOUS; SUBCUTANEOUS
Status: DISCONTINUED | OUTPATIENT
Start: 2019-05-03 | End: 2019-05-07

## 2019-05-03 RX ORDER — GLUCAGON 1 MG
1 KIT INJECTION
Status: DISCONTINUED | OUTPATIENT
Start: 2019-05-03 | End: 2019-05-05

## 2019-05-03 RX ORDER — HEPARIN SODIUM,PORCINE/D5W 25000/250
18 INTRAVENOUS SOLUTION INTRAVENOUS CONTINUOUS
Status: DISCONTINUED | OUTPATIENT
Start: 2019-05-03 | End: 2019-05-07

## 2019-05-03 RX ORDER — LORAZEPAM 2 MG/ML
1 INJECTION INTRAMUSCULAR ONCE
Status: DISCONTINUED | OUTPATIENT
Start: 2019-05-03 | End: 2019-05-06

## 2019-05-03 RX ORDER — IBUPROFEN 200 MG
16 TABLET ORAL
Status: DISCONTINUED | OUTPATIENT
Start: 2019-05-03 | End: 2019-05-05

## 2019-05-03 RX ORDER — FUROSEMIDE 10 MG/ML
40 INJECTION INTRAMUSCULAR; INTRAVENOUS DAILY
Status: DISCONTINUED | OUTPATIENT
Start: 2019-05-04 | End: 2019-05-04

## 2019-05-03 RX ADMIN — RIFAMPIN 300 MG: 600 INJECTION, POWDER, LYOPHILIZED, FOR SOLUTION INTRAVENOUS at 02:05

## 2019-05-03 RX ADMIN — HYDRALAZINE HYDROCHLORIDE 10 MG: 20 INJECTION INTRAMUSCULAR; INTRAVENOUS at 10:05

## 2019-05-03 RX ADMIN — CEFTRIAXONE 2 G: 2 INJECTION, SOLUTION INTRAVENOUS at 04:05

## 2019-05-03 RX ADMIN — FENTANYL CITRATE 50 MCG: 50 INJECTION INTRAMUSCULAR; INTRAVENOUS at 05:05

## 2019-05-03 RX ADMIN — CHLORHEXIDINE GLUCONATE 0.12% ORAL RINSE 15 ML: 1.2 LIQUID ORAL at 08:05

## 2019-05-03 RX ADMIN — FUROSEMIDE 40 MG: 10 INJECTION, SOLUTION INTRAMUSCULAR; INTRAVENOUS at 08:05

## 2019-05-03 RX ADMIN — ENOXAPARIN SODIUM 40 MG: 100 INJECTION SUBCUTANEOUS at 05:05

## 2019-05-03 RX ADMIN — FENTANYL CITRATE 50 MCG: 50 INJECTION INTRAMUSCULAR; INTRAVENOUS at 08:05

## 2019-05-03 RX ADMIN — HEPARIN SODIUM AND DEXTROSE 18 UNITS/KG/HR: 10000; 5 INJECTION INTRAVENOUS at 11:05

## 2019-05-03 RX ADMIN — SODIUM CHLORIDE 1.3 UNITS/HR: 9 INJECTION, SOLUTION INTRAVENOUS at 09:05

## 2019-05-03 RX ADMIN — MAGNESIUM SULFATE HEPTAHYDRATE 2 G: 40 INJECTION, SOLUTION INTRAVENOUS at 04:05

## 2019-05-03 RX ADMIN — PANTOPRAZOLE SODIUM 40 MG: 40 INJECTION, POWDER, LYOPHILIZED, FOR SOLUTION INTRAVENOUS at 08:05

## 2019-05-03 RX ADMIN — FENTANYL CITRATE 50 MCG: 50 INJECTION INTRAMUSCULAR; INTRAVENOUS at 06:05

## 2019-05-03 RX ADMIN — VANCOMYCIN HYDROCHLORIDE 1000 MG: 1 INJECTION, POWDER, LYOPHILIZED, FOR SOLUTION INTRAVENOUS at 08:05

## 2019-05-03 RX ADMIN — RIFAMPIN 300 MG: 600 INJECTION, POWDER, LYOPHILIZED, FOR SOLUTION INTRAVENOUS at 03:05

## 2019-05-03 NOTE — PROGRESS NOTES
Subjective/Objective  NAEO.       Scheduled Meds:   cefTRIAXone (ROCEPHIN) IVPB  2 g Intravenous Q24H    chlorhexidine  15 mL Mouth/Throat BID    enoxaparin  40 mg Subcutaneous Daily    [START ON 2019] furosemide  40 mg Intravenous Daily    insulin aspart U-100  3-6 Units Subcutaneous TIDWM    pantoprazole  40 mg Intravenous Daily    rifAMpin (RIFADIN) IVPB  300 mg Intravenous Q12H    vancomycin (VANCOCIN) IVPB  1,000 mg Intravenous Q12H     Continuous Infusions:   dexmedetomidine (PRECEDEX) infusion Stopped (19 1200)    insulin (HUMAN R) infusion (adults) 1.3 Units/hr (19 1000)     PRN Meds:acetaminophen, albuterol-ipratropium, calcium gluconate IVPB, calcium gluconate IVPB, calcium gluconate IVPB, dextrose 50%, dextrose 50%, [] fentaNYL **FOLLOWED BY** fentaNYL, glucagon (human recombinant), glucose, glucose, glycopyrrolate, hydrALAZINE, insulin aspart U-100, magnesium sulfate IVPB, magnesium sulfate IVPB, nitroGLYCERIN, ondansetron, potassium chloride in water **AND** potassium chloride in water **AND** potassium chloride in water, promethazine (PHENERGAN) IVPB, sodium chloride 0.9%    Vital signs in last 24 hours:  Temp:  [99.1 °F (37.3 °C)-100.8 °F (38.2 °C)] 99.1 °F (37.3 °C)  Pulse:  [102-124] 107  Resp:  [20-32] 25  SpO2:  [96 %-100 %] 100 %  BP: (113-175)/() 159/107    Intake/Output last 3 shifts:  I/O last 3 completed shifts:  In: 2525.4 [I.V.:2165.4; NG/GT:360]  Out: 3863 [Urine:2865; Drains:898; Stool:100]  Intake/Output this shift:  I/O this shift:  In: 30 [NG/GT:30]  Out: 180 [Urine:180]    Problem Assessment/Plan  Cardiac/Vascular  CAD (coronary artery disease)  Assessment & Plan  ASSESSMENT/PLAN:   Mariann CROW Huff is a 59 y.o. female s/p left ureteral injury on , who presented with fever, AMS, and intraabdominal abscess, taken for washout and ligation of left ureter with nephrostomy tube placement on . S/p Trach/PEG on .     Neuro:   - Pain control  with fentanyl prn   - Hold sedation     Pulmonary:   - Spontaneously ventilating via trachestomy on PAV+.    - Daily CXR.            - Lasix 40 IV once today  - ABGs prn   - Start trach collar trials     Cardiac:   - HDS at this time.  Pt has been weaned off pressors.   - TTE ordered yesterday, EF 55%     Renal:    - S/p transection of left ureter 4/12 and subsequent ligation and PCT nephrostomy tube placement with IR 4/21  - Renal function improving. BUN 26/.7.    - UOP 2.1 L total, net - 1.7L  - Monitor I/Os     ID:   - Blood cultures 4/12 +E. Coli; sensitivities pending. Repeat Bld Cx show NGTD.   - UCx from 4/20 growing E. Coli; sensitivities are now back. Repeat Cx pending.   - ID following for assistance with abx therapy, currently on Ceftriaxone, Rifampin and vancomycin.  - AF overnight  - WBC trending down     Hem/Onc:   - H/H stable at this time; cont to monitor     Endocrine:  - DM Type 2 at baseline    - Insulin gtt for better BG control  - Endocrine following for assistance with blood glucose control.      Fluids/Electrolytes/Nutrition/GI:   # Shock Liver          - Resolved           - Labs continue to show improvement          - Elevated LFTs now normalized          - Thrombocytopenia; plts count improving; plts normalized          - INR normalized to 0.9     # Lactic Acidosis          - Now resolved    # Diarrhea          - Pt with multiple episodes of loose watery stool resembling urine noted from rectal tube; 100 ml watery stool overnight          - C.Diff panel Negative          - MADHURI drain also with yellow fluid resembling urine.  Sample sent for Cr analysis, 1.7.      - Will start trickle feeds via the PEG today  - Start free water flushes 300 cc q6h  - Replace electrolytes PRN        PPx:  - DVT: Lovenox        DISPO:  - Continue SICU care.   - Wean vent  - Resume TF via PEG once appropriate                 Critical care was time spent personally by me on the following activities: development of  treatment plan with patient or surrogate and bedside caregivers, discussions with consultants, evaluation of patient's response to treatment, examination of patient, ordering and performing treatments and interventions, ordering and review of laboratory studies, ordering and review of radiographic studies, pulse oximetry, re-evaluation of patient's condition.  This critical care time did not overlap with that of any other provider or involve time for any procedures.     Zoë Du MD CA-1  Critical Care - Surgery

## 2019-05-03 NOTE — ASSESSMENT & PLAN NOTE
ASSESSMENT/PLAN:   Mariann Huff is a 59 y.o. female s/p left ureteral injury on 4/12, who presented with fever, AMS, and intraabdominal abscess, taken for washout and ligation of left ureter with nephrostomy tube placement on 4/21. S/p Trach/PEG on 5/2.     Neuro:   - Pain control with fentanyl prn      Pulmonary:   - Ventilated via trachestomy.    - Daily CXR.            - Lasix 40 IV BID given with excellent response   - ABGs prn   - Will put back on PAV+ this AM, see how she does.     Cardiac:   - HDS at this time.  Pt has been weaned off pressors.   - TTE ordered yesterday, EF 55%     Renal:    - S/p transection of left ureter 4/12 and subsequent ligation and PCT nephrostomy tube placement with IR 4/21  - Renal function improving. BUN 26/.7.    - UOP 2.1 L total, net - 1.7L  - Monitor I/Os     ID:   - Blood cultures 4/12 +E. Coli; sensitivities pending. Repeat Bld Cx show NGTD.   - UCx from 4/20 growing E. Coli; sensitivities are now back. Repeat Cx pending.   - ID following for assistance with abx therapy, currently on Ceftriaxone, Rifampin and vancomycin.  - AF overnight  - WBC trending down     Hem/Onc:   - H/H stable at this time; cont to monitor     Endocrine:  - DM Type 2 at baseline    - Insulin gtt for better BG control  - Endocrine following for assistance with blood glucose control.      Fluids/Electrolytes/Nutrition/GI:   # Shock Liver          - Resolved           - Labs continue to show improvement          - Elevated LFTs now normalized          - Thrombocytopenia; plts count improving; plts normalized          - INR normalized to 0.9     # Lactic Acidosis          - Now resolved    # Diarrhea          - Pt with multiple episodes of loose watery stool resembling urine noted from rectal tube; 100 ml watery stool overnight          - C.Diff panel Negative          - MADHURI drain also with yellow fluid resembling urine.  Sample sent for Cr analysis, 1.7.      - Holding TF for now, will use PEG once  appropriate  - Start free water flushes 300 cc q6h  - Replace electrolytes PRN        PPx:  - DVT: Lovenox        DISPO:  - Continue SICU care.   - Wean vent  - Resume TF via PEG once appropriate                 Critical care was time spent personally by me on the following activities: development of treatment plan with patient or surrogate and bedside caregivers, discussions with consultants, evaluation of patient's response to treatment, examination of patient, ordering and performing treatments and interventions, ordering and review of laboratory studies, ordering and review of radiographic studies, pulse oximetry, re-evaluation of patient's condition.  This critical care time did not overlap with that of any other provider or involve time for any procedures.     Zoë Du MD CA-1  Critical Care - Surgery

## 2019-05-03 NOTE — PROGRESS NOTES
Subjective/Objective  Interval History/Significant Events: patient did well overnight. S/p Trach and PEG yesterday. Tmax 100.9    Follow-up For: Procedure(s) (LRB):  Creation, Nephrostomy, Percutaneous (Left)    Post-Operative Day: 12 Days Post-Op     Objective:     Vital Signs (Most Recent):  Temp: 99.1 °F (37.3 °C) (05/03/19 0715)  Pulse: 106 (05/03/19 0800)  Resp: (!) 25 (05/03/19 0749)  BP: (!) 165/76 (05/03/19 0800)  SpO2: 98 % (05/03/19 0800) Vital Signs (24h Range):  Temp:  [99.1 °F (37.3 °C)-100.8 °F (38.2 °C)] 99.1 °F (37.3 °C)  Pulse:  [] 106  Resp:  [20-32] 25  SpO2:  [96 %-100 %] 98 %  BP: (113-175)/(57-82) 165/76     Weight: 79.5 kg (175 lb 4.3 oz)  Body mass index is 30.08 kg/m².      Intake/Output Summary (Last 24 hours) at 5/3/2019 0819  Last data filed at 5/3/2019 0800  Gross per 24 hour   Intake 1389.4 ml   Output 3015 ml   Net -1625.6 ml       Physical Exam   Constitutional: She appears well-developed and well-nourished.   HENT:   Head: Normocephalic and atraumatic.   Tracheostomy in place   Eyes: Right eye exhibits no discharge. Left eye exhibits no discharge.   Neck: Normal range of motion. Neck supple.   Cardiovascular: Normal rate and regular rhythm.   Pulmonary/Chest: Effort normal. No respiratory distress.   Ventilated via tracheostomy.    Abdominal:   Midline incision c/d/i.  MADHURI with scant serous drainage. PEG draining normal gastric contents. Abdomen soft, non-distended. Rectal tube with watery brown output.     Genitourinary:   Genitourinary Comments: Nephrostomy tube in place with watery dark yellow output.  Fontenot catheter+   Musculoskeletal: Normal range of motion.   Neurological:   Able to follow commands, denying pain.    Skin: Skin is warm and dry.   Vitals reviewed.    Vents:  Vent Mode: PAV+ (05/03/19 0749)  Set Rate: 16 bmp (04/29/19 0911)  Vt Set: 400 mL (04/29/19 0911)  Pressure Support: 0 cmH20 (04/29/19 1014)  PEEP/CPAP: 5 cmH20 (05/03/19 0749)  Oxygen Concentration (%):  49 (05/03/19 0800)  Peak Airway Pressure: 13 cmH2O (05/03/19 0749)  Plateau Pressure: 0 cmH20 (05/03/19 0749)  Total Ve: 7.51 mL (05/03/19 0749)  Negative Inspiratory Force (cm H2O): -18 (04/27/19 1306)  F/VT Ratio<105 (RSBI): (!) 85.03 (05/03/19 0749)    Lines/Drains/Airways     Drain                 Closed/Suction Drain 04/21/19 0300 LLQ 12 days         Nephrostomy 04/21/19 0629 Left 8 Fr. 12 days         Urethral Catheter 04/20/19 1711 Latex 16 Fr. 12 days         Rectal Tube 04/21/19 2100 rectal tube w/ balloon (indicate number of mLs) 11 days         Gastrostomy/Enterostomy 05/02/19 1134 Percutaneous endoscopic gastrostomy (PEG) LUQ decompression;feeding less than 1 day          Airway                 Surgical Airway 05/02/19 1107 Shiley Cuffed less than 1 day          Peripheral Intravenous Line                 Peripheral IV - Single Lumen 04/30/19 1505 20 G Right Forearm 2 days         Peripheral IV - Single Lumen 05/03/19 0244 22 G Left Forearm less than 1 day                Significant Labs:    ABG:  None in the last 48 hrs    CBC/Anemia Profile:  Recent Labs   Lab 05/02/19 0320 05/03/19  0243   WBC 12.55 11.57   HGB 9.1* 8.3*   HCT 29.4* 26.9*   * SEE COMMENT   MCV 88 88   RDW 13.6 13.7        Chemistries:  Recent Labs   Lab 05/02/19 0320 05/03/19  0243   * 139   K 4.0 3.7   CL 98 98   CO2 31* 29   BUN 26* 23*   CREATININE 0.7 0.8   CALCIUM 9.3 9.1   ALBUMIN 1.7* 1.7*   PROT 7.0 6.8   BILITOT 1.4* 1.6*   ALKPHOS 179* 160*   ALT 42 33   AST 34 30   MG 1.9 1.7   PHOS 2.6* 2.9       Significant Imaging:  I have reviewed all pertinent imaging results/findings within the past 24 hours.      Scheduled Meds:   cefTRIAXone (ROCEPHIN) IVPB  2 g Intravenous Q24H    chlorhexidine  15 mL Mouth/Throat BID    enoxaparin  40 mg Subcutaneous Daily    furosemide  40 mg Intravenous BID    pantoprazole  40 mg Intravenous Daily    rifAMpin (RIFADIN) IVPB  300 mg Intravenous Q12H    vancomycin (VANCOCIN)  IVPB  1,000 mg Intravenous Q12H     Continuous Infusions:   dexmedetomidine (PRECEDEX) infusion Stopped (19 1200)    insulin (HUMAN R) infusion (adults) 1.3 Units/hr (19 0800)     PRN Meds:acetaminophen, albuterol-ipratropium, calcium gluconate IVPB, calcium gluconate IVPB, calcium gluconate IVPB, dextrose 50%, dextrose 50%, [] fentaNYL **FOLLOWED BY** fentaNYL, glycopyrrolate, hydrALAZINE, magnesium sulfate IVPB, magnesium sulfate IVPB, nitroGLYCERIN, ondansetron, potassium chloride in water **AND** potassium chloride in water **AND** potassium chloride in water, promethazine (PHENERGAN) IVPB, sodium chloride 0.9%    Vital signs in last 24 hours:  Temp:  [99.1 °F (37.3 °C)-100.8 °F (38.2 °C)] 99.1 °F (37.3 °C)  Pulse:  [] 106  Resp:  [20-32] 25  SpO2:  [96 %-100 %] 98 %  BP: (113-175)/(57-82) 165/76    Intake/Output last 3 shifts:  I/O last 3 completed shifts:  In: 2525.4 [I.V.:2165.4; NG/GT:360]  Out: 3863 [Urine:2865; Drains:898; Stool:100]  Intake/Output this shift:  I/O this shift:  In: 30 [NG/GT:30]  Out: 20 [Urine:20]    Problem Assessment/Plan  Cardiac/Vascular  CAD (coronary artery disease)  Assessment & Plan  ASSESSMENT/PLAN:   Mariann TRISTIN Huff is a 59 y.o. female s/p left ureteral injury on , who presented with fever, AMS, and intraabdominal abscess, taken for washout and ligation of left ureter with nephrostomy tube placement on . S/p Trach/PEG on .     Neuro:   - Pain control with fentanyl prn      Pulmonary:   - Ventilated via trachestomy.    - Daily CXR.            - Lasix 40 IV BID given with excellent response   - ABGs prn   - Will put back on PAV+ this AM, see how she does.     Cardiac:   - HDS at this time.  Pt has been weaned off pressors.   - TTE ordered yesterday, EF 55%     Renal:    - S/p transection of left ureter  and subsequent ligation and PCT nephrostomy tube placement with IR   - Renal function improving. BUN 26/.7.    - UOP 2.1 L total, net  - 1.7L  - Monitor I/Os     ID:   - Blood cultures 4/12 +E. Coli; sensitivities pending. Repeat Bld Cx show NGTD.   - UCx from 4/20 growing E. Coli; sensitivities are now back. Repeat Cx pending.   - ID following for assistance with abx therapy, currently on Ceftriaxone, Rifampin and vancomycin.  - AF overnight  - WBC trending down     Hem/Onc:   - H/H stable at this time; cont to monitor     Endocrine:  - DM Type 2 at baseline    - Insulin gtt for better BG control  - Endocrine following for assistance with blood glucose control.      Fluids/Electrolytes/Nutrition/GI:   # Shock Liver          - Resolved           - Labs continue to show improvement          - Elevated LFTs now normalized          - Thrombocytopenia; plts count improving; plts normalized          - INR normalized to 0.9     # Lactic Acidosis          - Now resolved    # Diarrhea          - Pt with multiple episodes of loose watery stool resembling urine noted from rectal tube; 100 ml watery stool overnight          - C.Diff panel Negative          - MADHURI drain also with yellow fluid resembling urine.  Sample sent for Cr analysis, 1.7.      - Holding TF for now, will use PEG once appropriate  - Start free water flushes 300 cc q6h  - Replace electrolytes PRN        PPx:  - DVT: Lovenox        DISPO:  - Continue SICU care.   - Wean vent  - Resume TF via PEG once appropriate                 Critical care was time spent personally by me on the following activities: development of treatment plan with patient or surrogate and bedside caregivers, discussions with consultants, evaluation of patient's response to treatment, examination of patient, ordering and performing treatments and interventions, ordering and review of laboratory studies, ordering and review of radiographic studies, pulse oximetry, re-evaluation of patient's condition.  This critical care time did not overlap with that of any other provider or involve time for any procedures.     Zoë  MD Florian CA-1  Critical Care - Surgery

## 2019-05-03 NOTE — PLAN OF CARE
05/03/19 1517   Discharge Assessment   Assessment Type Discharge Planning Reassessment   Discharge Plan A Long-term acute care facility (LTAC)     Tach and PEG on 5/2/2019, trickle TF's and trach collar trials initiated. At this time the patient is appropriate for LTAC.

## 2019-05-03 NOTE — SUBJECTIVE & OBJECTIVE
Interval History: Pt afebrile today w/o leukocytosis. CXR today improved.    Review of Systems   Constitutional: Negative for activity change, chills, diaphoresis and fever.   Respiratory: Negative for cough, shortness of breath and wheezing.    Cardiovascular: Negative for chest pain.   Gastrointestinal: Negative for abdominal pain, constipation, diarrhea, nausea and vomiting.   Genitourinary: Negative for dysuria, frequency and urgency.   Neurological: Negative for dizziness.   Hematological: Does not bruise/bleed easily.     Objective:     Vital Signs (Most Recent):  Temp: 99.6 °F (37.6 °C) (05/03/19 1505)  Pulse: 107 (05/03/19 1505)  Resp: (!) 24 (05/03/19 1531)  BP: (!) 177/76 (05/03/19 1505)  SpO2: 100 % (05/03/19 1505) Vital Signs (24h Range):  Temp:  [99.1 °F (37.3 °C)-100.8 °F (38.2 °C)] 99.6 °F (37.6 °C)  Pulse:  [101-121] 107  Resp:  [24-25] 24  SpO2:  [96 %-100 %] 100 %  BP: (133-180)/() 177/76     Weight: 79.5 kg (175 lb 4.3 oz)  Body mass index is 30.08 kg/m².    Estimated Creatinine Clearance: 78.2 mL/min (based on SCr of 0.8 mg/dL).    Physical Exam   Constitutional: She is oriented to person, place, and time. She appears well-developed and well-nourished. No distress.       HENT:   Head: Normocephalic and atraumatic.   Cardiovascular: Normal rate, regular rhythm and normal heart sounds. Exam reveals no gallop and no friction rub.   No murmur heard.  Pulmonary/Chest: Effort normal and breath sounds normal. No respiratory distress. She has no wheezes. She has no rales.   Abdominal: Soft. Bowel sounds are normal. She exhibits no distension and no mass. There is no tenderness. There is no rebound and no guarding.   Musculoskeletal: She exhibits no edema.   Neurological: She is alert and oriented to person, place, and time.   Skin: Skin is warm and dry. She is not diaphoretic.   Psychiatric: She has a normal mood and affect. Her behavior is normal.       Significant Labs:   Blood Culture:   Recent  Labs   Lab 04/23/19  0430 04/23/19  0500 04/24/19  0950 04/30/19  0230 04/30/19  0247   LABBLOO No growth after 5 days. No growth after 5 days. No growth after 5 days. No Growth to date  No Growth to date  No Growth to date  No Growth to date No Growth to date  No Growth to date  No Growth to date  No Growth to date     BMP:   Recent Labs   Lab 05/03/19  0243   *      K 3.7   CL 98   CO2 29   BUN 23*   CREATININE 0.8   CALCIUM 9.1   MG 1.7     CBC:   Recent Labs   Lab 05/02/19  0320 05/03/19  0243   WBC 12.55 11.57   HGB 9.1* 8.3*   HCT 29.4* 26.9*   * SEE COMMENT     CMP:   Recent Labs   Lab 05/02/19 0320 05/03/19  0243   * 139   K 4.0 3.7   CL 98 98   CO2 31* 29   * 155*   BUN 26* 23*   CREATININE 0.7 0.8   CALCIUM 9.3 9.1   PROT 7.0 6.8   ALBUMIN 1.7* 1.7*   BILITOT 1.4* 1.6*   ALKPHOS 179* 160*   AST 34 30   ALT 42 33   ANIONGAP 6* 12   EGFRNONAA >60.0 >60.0     Microbiology Results (last 7 days)     Procedure Component Value Units Date/Time    Blood culture [[id number]] Collected:  04/30/19 0230    Order Status:  Completed Specimen:  Blood from Peripheral, Hand, Right Updated:  05/03/19 0612     Blood Culture, Routine No Growth to date     Blood Culture, Routine No Growth to date     Blood Culture, Routine No Growth to date     Blood Culture, Routine No Growth to date    Blood culture [[id number]] Collected:  04/30/19 0247    Order Status:  Completed Specimen:  Blood from Peripheral, Wrist, Right Updated:  05/03/19 0612     Blood Culture, Routine No Growth to date     Blood Culture, Routine No Growth to date     Blood Culture, Routine No Growth to date     Blood Culture, Routine No Growth to date    Culture, VAP (BAL) [[id number]] Collected:  04/30/19 0900    Order Status:  Completed Specimen:  Bronchial Alveolar Lavage from BAL, RUL Updated:  05/02/19 1342     VAP BAL CULTURE No growth     Gram Stain (Respiratory) <10 epithelial cells per low power field.     Gram Stain  (Respiratory) No WBC's     Gram Stain (Respiratory) No organisms seen    Clostridium difficile EIA [[id number]] Collected:  05/01/19 1230    Order Status:  Completed Specimen:  Stool Updated:  05/01/19 2110     C. diff Antigen Negative     C difficile Toxins A+B, EIA Negative     Comment: Testing not recommended for children <24 months old.       Blood culture [[id number]] Collected:  04/24/19 0950    Order Status:  Completed Specimen:  Blood from Peripheral, Wrist, Right Updated:  04/29/19 1412     Blood Culture, Routine No growth after 5 days.    Aerobic culture [[id number]]  (Susceptibility) Collected:  04/21/19 0125    Order Status:  Completed Specimen:  Body Fluid from Abdomen Updated:  04/29/19 0951     Aerobic Bacterial Culture --     STAPHYLOCOCCUS LUGDUNENSIS  Rare      Narrative:       Retroperitoneal fluid    Blood culture [[id number]] Collected:  04/23/19 0430    Order Status:  Completed Specimen:  Blood from Peripheral, Ankle, Right Updated:  04/28/19 0822     Blood Culture, Routine No growth after 5 days.    Narrative:       Please collect with AM labs    Blood culture [[id number]] Collected:  04/23/19 0500    Order Status:  Completed Specimen:  Blood from Peripheral, Antecubital, Right Updated:  04/28/19 0822     Blood Culture, Routine No growth after 5 days.    Narrative:       Please collect with AM labs        Wound Culture:   Recent Labs   Lab 04/21/19  0125   LABAERO STAPHYLOCOCCUS LUGDUNENSIS  Rare       All pertinent labs within the past 24 hours have been reviewed.    Significant Imaging: I have reviewed all pertinent imaging results/findings within the past 24 hours.

## 2019-05-03 NOTE — ASSESSMENT & PLAN NOTE
ASSESSMENT/PLAN:   Mariann Huff is a 59 y.o. female s/p left ureteral injury on 4/12, who presented with fever, AMS, and intraabdominal abscess, taken for washout and ligation of left ureter with nephrostomy tube placement on 4/21. S/p Trach/PEG on 5/2.     Neuro:   - Pain control with fentanyl prn      Pulmonary:   - Spontaneously ventilating via trachestomy on PAV+.    - Daily CXR.            - Lasix 40 IV once today  - ABGs prn   - Start trach collar trials     Cardiac:   - HDS at this time.  Pt has been weaned off pressors.   - TTE ordered yesterday, EF 55%     Renal:    - S/p transection of left ureter 4/12 and subsequent ligation and PCT nephrostomy tube placement with IR 4/21  - Renal function improving. BUN 26/.7.    - UOP 2.1 L total, net - 1.7L  - Monitor I/Os     ID:   - Blood cultures 4/12 +E. Coli; sensitivities pending. Repeat Bld Cx show NGTD.   - UCx from 4/20 growing E. Coli; sensitivities are now back. Repeat Cx pending.   - ID following for assistance with abx therapy, currently on Ceftriaxone, Rifampin and vancomycin.  - AF overnight  - WBC trending down     Hem/Onc:   - H/H stable at this time; cont to monitor     Endocrine:  - DM Type 2 at baseline    - Insulin gtt for better BG control  - Endocrine following for assistance with blood glucose control.      Fluids/Electrolytes/Nutrition/GI:   # Shock Liver          - Resolved           - Labs continue to show improvement          - Elevated LFTs now normalized          - Thrombocytopenia; plts count improving; plts normalized          - INR normalized to 0.9     # Lactic Acidosis          - Now resolved    # Diarrhea          - Pt with multiple episodes of loose watery stool resembling urine noted from rectal tube; 100 ml watery stool overnight          - C.Diff panel Negative          - MADHURI drain also with yellow fluid resembling urine.  Sample sent for Cr analysis, 1.7.      - Will start trickle feeds via the PEG today  - Start free water  flushes 300 cc q6h  - Replace electrolytes PRN        PPx:  - DVT: Lovenox        DISPO:  - Continue SICU care.   - Wean vent  - Resume TF via PEG once appropriate                 Critical care was time spent personally by me on the following activities: development of treatment plan with patient or surrogate and bedside caregivers, discussions with consultants, evaluation of patient's response to treatment, examination of patient, ordering and performing treatments and interventions, ordering and review of laboratory studies, ordering and review of radiographic studies, pulse oximetry, re-evaluation of patient's condition.  This critical care time did not overlap with that of any other provider or involve time for any procedures.     Zoë Du MD CA-1  Critical Care - Surgery

## 2019-05-03 NOTE — CONSULTS
Single lumen 18G x 10CM midline placed left basilic vein. Max dwell date 6/1/19, Lot# [id number].  Needle advanced into the vessel under real time ultrasound guidance.  Image recorded and saved.

## 2019-05-03 NOTE — SUBJECTIVE & OBJECTIVE
Interval History:   Trach/PEG yesterday. Remains on minimal vent settings. Low grade tachycardia. Temp to 100.8.    Review of Systems  Objective:     Temp:  [99.8 °F (37.7 °C)-100.8 °F (38.2 °C)] 99.9 °F (37.7 °C)  Pulse:  [] 103  Resp:  [20-32] 32  SpO2:  [92 %-100 %] 99 %  BP: (110-186)/(55-82) 152/70     Body mass index is 30.08 kg/m².           Drains     Drain                 Closed/Suction Drain 04/21/19 0300 LLQ 12 days         Nephrostomy 04/21/19 0629 Left 8 Fr. 12 days         Urethral Catheter 04/20/19 1711 Latex 16 Fr. 12 days         Rectal Tube 04/21/19 2100 rectal tube w/ balloon (indicate number of mLs) 11 days         Gastrostomy/Enterostomy 05/02/19 1134 Percutaneous endoscopic gastrostomy (PEG) LUQ decompression;feeding less than 1 day              Physical Exam   Constitutional: She appears well-developed and well-nourished. No distress.   HENT:   Head: Normocephalic and atraumatic.   Neck: No JVD present.   Cardiovascular: Normal rate and regular rhythm.    Pulmonary/Chest:   Trach in place - mechanically ventilated   Abdominal: Soft. She exhibits no distension. There is no rebound and no guarding.   Incision c/d/i   MADHURI drain with ss output  G-tube to gravity   Genitourinary:   Genitourinary Comments: Fontenot draining clear yellow  Left neph tube with clear yellow urine   Neurological: She is alert.   Skin: Skin is warm and dry. She is not diaphoretic. No pallor.       Significant Labs:    BMP:  Recent Labs   Lab 05/01/19  0344 05/02/19  0320 05/03/19  0243    135* 139   K 4.2 4.0 3.7   CL 99 98 98   CO2 27 31* 29   BUN 26* 26* 23*   CREATININE 0.7 0.7 0.8   CALCIUM 9.8 9.3 9.1       CBC:   Recent Labs   Lab 05/01/19  0344 05/02/19  0320 05/03/19  0243   WBC 15.10* 12.55 11.57   HGB 10.2* 9.1* 8.3*   HCT 33.7* 29.4* 26.9*    358* SEE COMMENT       All pertinent labs results from the past 24 hours have been reviewed.    Significant Imaging:  All pertinent imaging results/findings  from the past 24 hours have been reviewed.

## 2019-05-03 NOTE — ASSESSMENT & PLAN NOTE
60 y/o female with HTN, DMII, CAD, NSTEMI, s/p L5-S1 OLIF with NSGY 4/12 c/b intraoperative left ureteral injury and underwent ureteroureteral anastomoses and ureteral stent placement. She was discharged with a correa and MADHURI drain that was removed on 4/16. She was seen by urology and anticipated stent removal in 2-3 months (6/2019).  She presents to ED with AMS and E.coli septicemia found to have air and fluid collection of left anterior prevertebral soft tissue with left ureter involvement. She underwent ex-lap and washout on 4/21. We are consulted for abx recommendations.      Per op note,  There were pocket of purulence noted and evacuated, sent for culture of left retroperitoneum to pole of left kidney. The stent was visible. Posterior to the stent there was a pocket of purulent fluid and exposed hardware along the spinal vertebrae. The tissue along the distal and proximal end of ureter were friable and unhealthy. She underwent left nephrostomy tube placement. The stent was removed and several metal clips were placed on proximal end of the ureteral.      OR cultures with Staph Lugdenensis - not currently covered.  Patient does appear to be having diarrhea - C diff negative but suspect may need restesting if WBC increases and no other source found.  QTc long at 480      Repeat BCXs sent and NGTD.  BAL done but no WBC no organisms seen - NGTD.  Source of fever and leukocytosis unclear but ABD suspected though could be non infectious - drug reaction vs PE/DVT.  CT abdomen with superficial fluid collection - possible seroma and inflammatory change around ureter.  C diff repeated and negative.  Line changes jonathan cvc - removed.  Patient stable so suspect best course is to monitor for now.    Recommendations:  1. Continue ceftriaxone [id number] and rifampin 300 mg q12hr IV (transition to oral once able) given hardware in place.   2. Continue Vancomycin.  Managed by Pharm. Trough goal 15-20  3. If no improvement may need  aspiration of anterior abdominal fluid collection  4. May need to change abx out of concern for drug reaction and assess for other non infectious causes of fever.  5. Anticipate 6 weeks of IV abx from first negative blood cultures (4/23-6/4/19)  followed by oral abx suppression given retained hardware.   6. If patient decompensates then DC ceftriaxone and start Meropenem.   7. ID will continue to follow [place] over [place].

## 2019-05-03 NOTE — ASSESSMENT & PLAN NOTE
Mariann Huff is a 59 y.o. female s/p left ureteral injury on 4/12, presented with fever, AMS, and intraabdominal abscess, taken for washout and ligation of left ureter with neph tube placement on 4/21, Trach/PEG 5/2.    - Management per SICU  - tube feeds when appropriate per gen surg/SICU  - Trach per SICU  - Original urine cultures with E. Coli, wound culture growing staph lugdeensis on Rocephin per ID recs. Rifampin for hardware. Vancomycin added for recent fevers  - Maintain correa, neph tube, and MADHURI drain  - No need for intervention of abdominal wall fluid collection at this time, continue to monitor clinically, if worsens then may need to drain, but likely to be seroma  - continue ICU care

## 2019-05-03 NOTE — SUBJECTIVE & OBJECTIVE
Interval History/Significant Events: patient did well overnight. S/p Trach and PEG yesterday. Tmax 100.9    Follow-up For: Procedure(s) (LRB):  Creation, Nephrostomy, Percutaneous (Left)    Post-Operative Day: 12 Days Post-Op     Objective:     Vital Signs (Most Recent):  Temp: 99.1 °F (37.3 °C) (05/03/19 0715)  Pulse: 106 (05/03/19 0800)  Resp: (!) 25 (05/03/19 0749)  BP: (!) 165/76 (05/03/19 0800)  SpO2: 98 % (05/03/19 0800) Vital Signs (24h Range):  Temp:  [99.1 °F (37.3 °C)-100.8 °F (38.2 °C)] 99.1 °F (37.3 °C)  Pulse:  [] 106  Resp:  [20-32] 25  SpO2:  [96 %-100 %] 98 %  BP: (113-175)/(57-82) 165/76     Weight: 79.5 kg (175 lb 4.3 oz)  Body mass index is 30.08 kg/m².      Intake/Output Summary (Last 24 hours) at 5/3/2019 0819  Last data filed at 5/3/2019 0800  Gross per 24 hour   Intake 1389.4 ml   Output 3015 ml   Net -1625.6 ml       Physical Exam   Constitutional: She appears well-developed and well-nourished.   HENT:   Head: Normocephalic and atraumatic.   Tracheostomy in place   Eyes: Right eye exhibits no discharge. Left eye exhibits no discharge.   Neck: Normal range of motion. Neck supple.   Cardiovascular: Normal rate and regular rhythm.   Pulmonary/Chest: Effort normal. No respiratory distress.   Ventilated via tracheostomy.    Abdominal:   Midline incision c/d/i.  MADHURI with scant serous drainage. PEG draining normal gastric contents. Abdomen soft, non-distended. Rectal tube with watery brown output.     Genitourinary:   Genitourinary Comments: Nephrostomy tube in place with watery dark yellow output.  Fontenot catheter+   Musculoskeletal: Normal range of motion.   Neurological:   Able to follow commands, denying pain.    Skin: Skin is warm and dry.   Vitals reviewed.    Vents:  Vent Mode: PAV+ (05/03/19 0749)  Set Rate: 16 bmp (04/29/19 0911)  Vt Set: 400 mL (04/29/19 0911)  Pressure Support: 0 cmH20 (04/29/19 1014)  PEEP/CPAP: 5 cmH20 (05/03/19 0749)  Oxygen Concentration (%): 49 (05/03/19  0800)  Peak Airway Pressure: 13 cmH2O (05/03/19 0749)  Plateau Pressure: 0 cmH20 (05/03/19 0749)  Total Ve: 7.51 mL (05/03/19 0749)  Negative Inspiratory Force (cm H2O): -18 (04/27/19 1306)  F/VT Ratio<105 (RSBI): (!) 85.03 (05/03/19 0749)    Lines/Drains/Airways     Drain                 Closed/Suction Drain 04/21/19 0300 LLQ 12 days         Nephrostomy 04/21/19 0629 Left 8 Fr. 12 days         Urethral Catheter 04/20/19 1711 Latex 16 Fr. 12 days         Rectal Tube 04/21/19 2100 rectal tube w/ balloon (indicate number of mLs) 11 days         Gastrostomy/Enterostomy 05/02/19 1134 Percutaneous endoscopic gastrostomy (PEG) LUQ decompression;feeding less than 1 day          Airway                 Surgical Airway 05/02/19 1107 Shiley Cuffed less than 1 day          Peripheral Intravenous Line                 Peripheral IV - Single Lumen 04/30/19 1505 20 G Right Forearm 2 days         Peripheral IV - Single Lumen 05/03/19 0244 22 G Left Forearm less than 1 day                Significant Labs:    ABG:  None in the last 48 hrs    CBC/Anemia Profile:  Recent Labs   Lab 05/02/19  0320 05/03/19  0243   WBC 12.55 11.57   HGB 9.1* 8.3*   HCT 29.4* 26.9*   * SEE COMMENT   MCV 88 88   RDW 13.6 13.7        Chemistries:  Recent Labs   Lab 05/02/19  0320 05/03/19  0243   * 139   K 4.0 3.7   CL 98 98   CO2 31* 29   BUN 26* 23*   CREATININE 0.7 0.8   CALCIUM 9.3 9.1   ALBUMIN 1.7* 1.7*   PROT 7.0 6.8   BILITOT 1.4* 1.6*   ALKPHOS 179* 160*   ALT 42 33   AST 34 30   MG 1.9 1.7   PHOS 2.6* 2.9       Significant Imaging:  I have reviewed all pertinent imaging results/findings within the past 24 hours.

## 2019-05-03 NOTE — PROGRESS NOTES
[facility]  Urology  Progress Note    Patient Name: Mariann Huff  MRN: [id number]  Admission Date: 4/20/2019  Hospital Length of Stay: 13 days  Code Status: Full Code   Attending Provider: Paul Carlson MD   Primary Care Physician: Jasbir Haney MD    Subjective:     HPI:  Mariann Huff is a 59 y.o. female with history of HTN, type 2 diabetes mellitus, CAD, NSTEMI, and back pain who presents to [facility] with altered mental status and sepsis. She underwent L5-S1 OLIF with NSGY on 4/12 and had intraoperative left ureteral injury with ureteroureteral anastomosis and ureteral stent placement. She did well initially and had correa and MADHURI drain removed on 4/16. She began having chills and altered mental status 2 days ago and this has progressively worsened. No complaints of pain.     She is altered and HPI is limited. In the ED she is febrile to 103 and tachycardic and pressures are low normal. WBC is 5, creatinine is 3.6 baseline 1.0, lactate is 4.6. Cath UA concerning for infection, 3+ LE, >100 WBCs, and many bacteria on microscopy.    CT and MRI abdomen both show air with minimal fluid near the surgical site with left ureter coursing through. There is air throughout the proximal collecting system which is decompressed with JJ ureteral stent in good position.    Taken for ex lap, ligation of left ureter and left neph tube placement on 4/21/19.    Interval History:   Trach/PEG yesterday. Remains on minimal vent settings. Low grade tachycardia. Temp to 100.8.    Review of Systems  Objective:     Temp:  [99.8 °F (37.7 °C)-100.8 °F (38.2 °C)] 99.9 °F (37.7 °C)  Pulse:  [] 103  Resp:  [20-32] 32  SpO2:  [92 %-100 %] 99 %  BP: (110-186)/(55-82) 152/70     Body mass index is 30.08 kg/m².           Drains     Drain                 Closed/Suction Drain 04/21/19 0300 LLQ 12 days         Nephrostomy 04/21/19 0629 Left 8 Fr. 12 days         Urethral Catheter 04/20/19 1711 Latex 16 Fr. 12 days         Rectal  Tube 04/21/19 2100 rectal tube w/ balloon (indicate number of mLs) 11 days         Gastrostomy/Enterostomy 05/02/19 1134 Percutaneous endoscopic gastrostomy (PEG) LUQ decompression;feeding less than 1 day              Physical Exam   Constitutional: She appears well-developed and well-nourished. No distress.   HENT:   Head: Normocephalic and atraumatic.   Neck: No JVD present.   Cardiovascular: Normal rate and regular rhythm.    Pulmonary/Chest:   Trach in place - mechanically ventilated   Abdominal: Soft. She exhibits no distension. There is no rebound and no guarding.   Incision c/d/i   MADHURI drain with ss output  G-tube to gravity   Genitourinary:   Genitourinary Comments: Fontenot draining clear yellow  Left neph tube with clear yellow urine   Neurological: She is alert.   Skin: Skin is warm and dry. She is not diaphoretic. No pallor.       Significant Labs:    BMP:  Recent Labs   Lab 05/01/19  0344 05/02/19  0320 05/03/19  0243    135* 139   K 4.2 4.0 3.7   CL 99 98 98   CO2 27 31* 29   BUN 26* 26* 23*   CREATININE 0.7 0.7 0.8   CALCIUM 9.8 9.3 9.1       CBC:   Recent Labs   Lab 05/01/19  0344 05/02/19  0320 05/03/19  0243   WBC 15.10* 12.55 11.57   HGB 10.2* 9.1* 8.3*   HCT 33.7* 29.4* 26.9*    358* SEE COMMENT       All pertinent labs results from the past 24 hours have been reviewed.    Significant Imaging:  All pertinent imaging results/findings from the past 24 hours have been reviewed.        Assessment/Plan:     * Ureteral transection of left native kidney  Mariann Huff is a 59 y.o. female s/p left ureteral injury on 4/12, presented with fever, AMS, and intraabdominal abscess, taken for washout and ligation of left ureter with neph tube placement on 4/21, Trach/PEG 5/2.    - Management per SICU  - tube feeds when appropriate per gen surg/SICU  - Trach per SICU  - Original urine cultures with E. Coli, wound culture growing staph lugdeensis on Rocephin per ID recs. Rifampin for hardware.  Vancomycin added for recent fevers  - Maintain correa, neph tube, and MADHURI drain  - No need for intervention of abdominal wall fluid collection at this time, continue to monitor clinically, if worsens then may need to drain, but likely to be seroma  - continue ICU care    Sepsis  As above        VTE Risk Mitigation (From admission, onward)        Ordered     enoxaparin injection 40 mg  Daily      04/30/19 0722     Place sequential compression device  Until discontinued      04/20/19 2108     IP VTE HIGH RISK PATIENT  Once      04/20/19 2108          Hugh Higuera MD  Urology  [facility]

## 2019-05-03 NOTE — NURSING
Pt noted to have moderate amount of bright red blood around perineum area, upon inspection noted to be oozing from rectum. BMS deflated and 40ml pulled from balloon. Notified MD Srinivas to verify keeping BMS. Pt has skin tears around rectum and has moderate amount of liquid fecal oozing from rectum. Per MD to re-inflate BMS and keep in place as of now. CBC ordered. Unable to collect lab after multiple sticks. Midline ordered from MD also. CBC obtained from midline. Right arm also noted to be swollen larger then left arm. Right arm taut and cool. Pulses still palpable. Notified MD Srinivas per MD to put in order for vascular US. Will carry out orders and continue to monitor closely.

## 2019-05-03 NOTE — SUBJECTIVE & OBJECTIVE
Interval History:   S/p Trach/PEG  No issues overnight  Pain controlled per patient    Medications:  Continuous Infusions:   dexmedetomidine (PRECEDEX) infusion Stopped (19 1200)    insulin (HUMAN R) infusion (adults) 1.3 Units/hr (19 0500)     Scheduled Meds:   cefTRIAXone (ROCEPHIN) IVPB  2 g Intravenous Q24H    chlorhexidine  15 mL Mouth/Throat BID    enoxaparin  40 mg Subcutaneous Daily    furosemide  40 mg Intravenous BID    pantoprazole  40 mg Intravenous Daily    rifAMpin (RIFADIN) IVPB  300 mg Intravenous Q12H    vancomycin (VANCOCIN) IVPB  1,000 mg Intravenous Q12H     PRN Meds:acetaminophen, albuterol-ipratropium, calcium gluconate IVPB, calcium gluconate IVPB, calcium gluconate IVPB, dextrose 50%, dextrose 50%, [] fentaNYL **FOLLOWED BY** fentaNYL, glycopyrrolate, hydrALAZINE, magnesium sulfate IVPB, magnesium sulfate IVPB, nitroGLYCERIN, ondansetron, potassium chloride in water **AND** potassium chloride in water **AND** potassium chloride in water, promethazine (PHENERGAN) IVPB, sodium chloride 0.9%     Review of patient's allergies indicates:  No Known Allergies  Objective:     Vital Signs (Most Recent):  Temp: 99.9 °F (37.7 °C) (19 0300)  Pulse: 103 (19 0530)  Resp: (!) 32 (19 1509)  BP: (!) 152/70 (19 0500)  SpO2: 99 % (19 0530) Vital Signs (24h Range):  Temp:  [99.8 °F (37.7 °C)-100.8 °F (38.2 °C)] 99.9 °F (37.7 °C)  Pulse:  [] 103  Resp:  [20-32] 32  SpO2:  [95 %-100 %] 99 %  BP: (113-186)/(57-82) 152/70     Weight: 79.5 kg (175 lb 4.3 oz)  Body mass index is 30.08 kg/m².    Intake/Output - Last 3 Shifts       700 - 0659 700 -  0659 700 -  0659    I.V. (mL/kg) 1584 (19.9) 1359.4 (17.1)     NG/GT 1420 0     Total Intake(mL/kg) 3004 (37.8) 1359.4 (17.1)     Urine (mL/kg/hr) 2720 (1.4) 2040 (1.1)     Drains 38 680     Stool  100     Total Output 2758 2820     Net +246 -1460.6                  Physical  Exam   Cardiovascular: Normal rate and regular rhythm.   Pulmonary/Chest:   Trach in place; no bleeding   Abdominal: Soft. She exhibits no distension.   PEG in place LUQ   Neurological: She is alert.   Following commands   Nursing note and vitals reviewed.      Significant Labs:  Reviewed    Significant Diagnostics:  Reviewed

## 2019-05-03 NOTE — PROGRESS NOTES
[facility]  Infectious Disease  Progress Note    Patient Name: Mariann Huff  MRN: [id number]  Admission Date: 4/20/2019  Length of Stay: 13 days  Attending Physician: Paul Carlson MD  Primary Care Provider: Jasbir Haney MD    Isolation Status: No active isolations  Assessment/Plan:      Sepsis  58 y/o female with HTN, DMII, CAD, NSTEMI, s/p L5-S1 OLIF with NSGY 4/12 c/b intraoperative left ureteral injury and underwent ureteroureteral anastomoses and ureteral stent placement. She was discharged with a correa and MADHURI drain that was removed on 4/16. She was seen by urology and anticipated stent removal in 2-3 months (6/2019).  She presents to ED with AMS and E.coli septicemia found to have air and fluid collection of left anterior prevertebral soft tissue with left ureter involvement. She underwent ex-lap and washout on 4/21. We are consulted for abx recommendations.      Per op note,  There were pocket of purulence noted and evacuated, sent for culture of left retroperitoneum to pole of left kidney. The stent was visible. Posterior to the stent there was a pocket of purulent fluid and exposed hardware along the spinal vertebrae. The tissue along the distal and proximal end of ureter were friable and unhealthy. She underwent left nephrostomy tube placement. The stent was removed and several metal clips were placed on proximal end of the ureteral.      OR cultures with Staph Lugdenensis - not currently covered.  Patient does appear to be having diarrhea - C diff negative but suspect may need restesting if WBC increases and no other source found.  QTc long at 480      Repeat BCXs sent and NGTD.  BAL done but no WBC no organisms seen - NGTD.  Source of fever and leukocytosis unclear but ABD suspected though could be non infectious - drug reaction vs PE/DVT.  CT abdomen with superficial fluid collection - possible seroma and inflammatory change around ureter.  C diff repeated and negative.  Line  changes jonathan cvc - removed.  Patient stable so suspect best course is to monitor for now.  Pt now w/ trach and PEG.    Recommendations:  1. Continue ceftriaxone [id number] and rifampin 300 mg q12hr IV (transition to oral once able) given hardware in place.   2. Continue Vancomycin.  Managed by Pharm. Trough goal 15-20  3. If no improvement may need aspiration of anterior abdominal fluid collection  4. May need to change abx out of concern for drug reaction and assess for other non infectious causes of fever.  5. Anticipate 6 weeks of IV abx from first negative blood cultures (4/23-6/4/19)  followed by oral abx suppression given retained hardware.   6. If patient decompensates then DC ceftriaxone and start Meropenem.   7. ID will continue to follow from [place] over the weekend.            Thank you for your consult. ID will follow-up with patient. Please contact us if you have any additional questions.    Tomasz Betancourt PA-C  Infectious Disease  [facility]    Subjective:     Principal Problem:Ureteral transection of left native kidney    HPI: 60 y/o female with HTN, DMII, CAD, NSTEMI, s/p L5-S1 OLIF with NSGY 4/12 sustained intraoperative laceration and underwent ureteroureteral anastomoses and ureteral stent placement. She was discharged with a correa and MADHURI drain that was removed on 4/16. She was seen by urology and anticipated stent removal in 2-3 months (6/2019).  She presents to ED with AMS and sepsis. She was febrile 103 in ED. WBC 5K on admit. Lactate 4.6. Cath UA with 3+leukocytes, >100 WBcs and many bacteria.     MRI: Postsurgical change of L5-S1 posterior spinal fusion and anterior interbody fusion with a 4.4 cm fluid fluid collection in the left anterior prevertebral soft tissues at the level of the L5-S1 disc space.  Findings may represent postoperative seroma or evolving hematoma however, urinoma not excluded.  No definite abscess although correlation is advised.    Irregular flow  void involving the right common iliac vein, underlying thrombus not excluded.      CT: air collection within the anterior paraspinous soft tissues through which the left ureter courses. Given that the ureter courses through this, communication of the ureter with this collection is not excluded.  There is a ureteral stent within the left ureter.Punctate foci of air in L5-S1.  2. Air within the left renal collecting system, along the left ureter, and within the urinary bladder, correlation advised.    This was not amendable for drainage by IR so she was taken to OR for washout.     She underwent ex-lap of left ureter and left nephrostomy tube placement 4/21/19.   Per op note,  There were pocket of purulence noted and evacuated, sent for culture of left retroperitoneum to pole of left kidney. The stent was visible. Posterior to the stent there was a pocket of purulent fluid and exposed hardware along the spinal vertebrae. The tissue along the distal and proximal end of ureter were friable and unhealthy. She underwent left nephrostomy tube placement. The stent was removed and several metal clips were placed on proximal end of the ureteral. MADHURI drain was placed into left retroperitoneal.     Blood cultures were are positive for E coli..   Urine cultures +E.coli  OR cultures were positive for Staph Ludenensis  She is was treated with zosyn. She is in the ICU, intubated, sedated    ID had seen the patient and there was concern for hardware involvement given proximity and the patient was placed on ceftriaxone and rifampin with plan for 6 weeks of abx.  ID now reconsulted due to fever and leukocytosis.  Patient denies any abdominal pain , fever, chills or sweats.  Repeat Bblood cultures and urine cultures are no growth.    Interval History: Pt afebrile today w/o leukocytosis. CXR today improved.    Review of Systems   Constitutional: Negative for activity change, chills, diaphoresis and fever.   Respiratory: Negative for  cough, shortness of breath and wheezing.    Cardiovascular: Negative for chest pain.   Gastrointestinal: Negative for abdominal pain, constipation, diarrhea, nausea and vomiting.   Genitourinary: Negative for dysuria, frequency and urgency.   Neurological: Negative for dizziness.   Hematological: Does not bruise/bleed easily.     Objective:     Vital Signs (Most Recent):  Temp: 99.6 °F (37.6 °C) (05/03/19 1505)  Pulse: 107 (05/03/19 1505)  Resp: (!) 24 (05/03/19 1531)  BP: (!) 177/76 (05/03/19 1505)  SpO2: 100 % (05/03/19 1505) Vital Signs (24h Range):  Temp:  [99.1 °F (37.3 °C)-100.8 °F (38.2 °C)] 99.6 °F (37.6 °C)  Pulse:  [101-121] 107  Resp:  [24-25] 24  SpO2:  [96 %-100 %] 100 %  BP: (133-180)/() 177/76     Weight: 79.5 kg (175 lb 4.3 oz)  Body mass index is 30.08 kg/m².    Estimated Creatinine Clearance: 78.2 mL/min (based on SCr of 0.8 mg/dL).    Physical Exam   Constitutional: She is oriented to person, place, and time. She appears well-developed and well-nourished. No distress.       HENT:   Head: Normocephalic and atraumatic.   Cardiovascular: Normal rate, regular rhythm and normal heart sounds. Exam reveals no gallop and no friction rub.   No murmur heard.  Pulmonary/Chest: Effort normal and breath sounds normal. No respiratory distress. She has no wheezes. She has no rales.   Abdominal: Soft. Bowel sounds are normal. She exhibits no distension and no mass. There is no tenderness. There is no rebound and no guarding.   Musculoskeletal: She exhibits no edema.   Neurological: She is alert and oriented to person, place, and time.   Skin: Skin is warm and dry. She is not diaphoretic.   Psychiatric: She has a normal mood and affect. Her behavior is normal.       Significant Labs:   Blood Culture:   Recent Labs   Lab 04/23/19  0430 04/23/19  0500 04/24/19  0950 04/30/19  0230 04/30/19  0247   [facility] No growth after 5 days. No growth after 5 days. No growth after 5 days. No Growth to date  No Growth to  date  No Growth to date  No Growth to date No Growth to date  No Growth to date  No Growth to date  No Growth to date     BMP:   Recent Labs   Lab 05/03/19  0243   *      K 3.7   CL 98   CO2 29   BUN 23*   CREATININE 0.8   CALCIUM 9.1   MG 1.7     CBC:   Recent Labs   Lab 05/02/19  0320 05/03/19  0243   WBC 12.55 11.57   HGB 9.1* 8.3*   HCT 29.4* 26.9*   * SEE COMMENT     CMP:   Recent Labs   Lab 05/02/19  0320 05/03/19  0243   * 139   K 4.0 3.7   CL 98 98   CO2 31* 29   * 155*   BUN 26* 23*   CREATININE 0.7 0.8   CALCIUM 9.3 9.1   PROT 7.0 6.8   ALBUMIN 1.7* 1.7*   BILITOT 1.4* 1.6*   ALKPHOS 179* 160*   AST 34 30   ALT 42 33   ANIONGAP 6* 12   EGFRNONAA >60.0 >60.0     Microbiology Results (last 7 days)     Procedure Component Value Units Date/Time    Blood culture [[id number]] Collected:  04/30/19 0230    Order Status:  Completed Specimen:  Blood from Peripheral, Hand, Right Updated:  05/03/19 0612     Blood Culture, Routine No Growth to date     Blood Culture, Routine No Growth to date     Blood Culture, Routine No Growth to date     Blood Culture, Routine No Growth to date    Blood culture [[id number]] Collected:  04/30/19 0247    Order Status:  Completed Specimen:  Blood from Peripheral, Wrist, Right Updated:  05/03/19 0612     Blood Culture, Routine No Growth to date     Blood Culture, Routine No Growth to date     Blood Culture, Routine No Growth to date     Blood Culture, Routine No Growth to date    Culture, VAP (BAL) [[id number]] Collected:  04/30/19 0900    Order Status:  Completed Specimen:  Bronchial Alveolar Lavage from BAL, RUL Updated:  05/02/19 1342     VAP BAL CULTURE No growth     Gram Stain (Respiratory) <10 epithelial cells per low power field.     Gram Stain (Respiratory) No WBC's     Gram Stain (Respiratory) No organisms seen    Clostridium difficile EIA [[id number]] Collected:  05/01/19 1230    Order Status:  Completed Specimen:  Stool Updated:  05/01/19  2110     C. diff Antigen Negative     C difficile Toxins A+B, EIA Negative     Comment: Testing not recommended for children <24 months old.       Blood culture [[id number]] Collected:  04/24/19 0950    Order Status:  Completed Specimen:  Blood from Peripheral, Wrist, Right Updated:  04/29/19 1412     Blood Culture, Routine No growth after 5 days.    Aerobic culture [[id number]]  (Susceptibility) Collected:  04/21/19 0125    Order Status:  Completed Specimen:  Body Fluid from Abdomen Updated:  04/29/19 0951     Aerobic Bacterial Culture --     STAPHYLOCOCCUS LUGDUNENSIS  Rare      Narrative:       Retroperitoneal fluid    Blood culture [[id number]] Collected:  04/23/19 0430    Order Status:  Completed Specimen:  Blood from Peripheral, Ankle, Right Updated:  04/28/19 0822     Blood Culture, Routine No growth after 5 days.    Narrative:       Please collect with AM labs    Blood culture [[id number]] Collected:  04/23/19 0500    Order Status:  Completed Specimen:  Blood from Peripheral, Antecubital, Right Updated:  04/28/19 0822     Blood Culture, Routine No growth after 5 days.    Narrative:       Please collect with AM labs        Wound Culture:   Recent Labs   Lab 04/21/19 0125   LABAERO STAPHYLOCOCCUS LUGDUNENSIS  Rare       All pertinent labs within the past 24 hours have been reviewed.    Significant Imaging: I have reviewed all pertinent imaging results/findings within the past 24 hours.

## 2019-05-03 NOTE — ASSESSMENT & PLAN NOTE
Ms. Huff is a 58 yo F with PMH of HTN, DMII, CAD, NSTEMI, s/p L5-S1 OLIF with NSGY 4/12 c/b intraoperative left ureteral injury and underwent ureteroureteral anastomoses and ureteral stent placement complicated by sepsis due to E.coli septicemia now s/p ex lap on 4/21 since then patient has been intubated and unable to be weaned off the vent.     - Uncomplicated Trach/PEG placement yesterday  - Surgery will sign off.  Please call with questions

## 2019-05-03 NOTE — PROGRESS NOTES
[facility]  General Surgery  Progress Note    Subjective:     History of Present Illness:  Ms. Huff is a 58 yo F with PMH of HTN, DMII, CAD, NSTEMI, s/p L5-S1 OLIF with NSGY  c/b intraoperative left ureteral injury and underwent ureteroureteral anastomoses and ureteral stent placement complicated by sepsis due to E.coli septicemia now s/p ex lap on  since then patient has been intubated and unable to be weaned off the vent.     General Surgery consulted for PEG and Trach.     On minimal vent settings. Tolerating Tube feeds through OGT.     Post-Op Info:  Procedure(s) (LRB):  CREATION, TRACHEOSTOMY (N/A)  BRONCHOSCOPY (N/A)  EGD, WITH PEG TUBE INSERTION   1 Day Post-Op     Interval History:   S/p Trach/PEG  No issues overnight  Pain controlled per patient    Medications:  Continuous Infusions:   dexmedetomidine (PRECEDEX) infusion Stopped (19 1200)    insulin (HUMAN R) infusion (adults) 1.3 Units/hr (19 0500)     Scheduled Meds:   cefTRIAXone (ROCEPHIN) IVPB  2 g Intravenous Q24H    chlorhexidine  15 mL Mouth/Throat BID    enoxaparin  40 mg Subcutaneous Daily    furosemide  40 mg Intravenous BID    pantoprazole  40 mg Intravenous Daily    rifAMpin (RIFADIN) IVPB  300 mg Intravenous Q12H    vancomycin (VANCOCIN) IVPB  1,000 mg Intravenous Q12H     PRN Meds:acetaminophen, albuterol-ipratropium, calcium gluconate IVPB, calcium gluconate IVPB, calcium gluconate IVPB, dextrose 50%, dextrose 50%, [] fentaNYL **FOLLOWED BY** fentaNYL, glycopyrrolate, hydrALAZINE, magnesium sulfate IVPB, magnesium sulfate IVPB, nitroGLYCERIN, ondansetron, potassium chloride in water **AND** potassium chloride in water **AND** potassium chloride in water, promethazine (PHENERGAN) IVPB, sodium chloride 0.9%     Review of patient's allergies indicates:  No Known Allergies  Objective:     Vital Signs (Most Recent):  Temp: 99.9 °F (37.7 °C) (19 0300)  Pulse: 103 (19 0530)  Resp:  (!) 32 (05/02/19 1509)  BP: (!) 152/70 (05/03/19 0500)  SpO2: 99 % (05/03/19 0530) Vital Signs (24h Range):  Temp:  [99.8 °F (37.7 °C)-100.8 °F (38.2 °C)] 99.9 °F (37.7 °C)  Pulse:  [] 103  Resp:  [20-32] 32  SpO2:  [95 %-100 %] 99 %  BP: (113-186)/(57-82) 152/70     Weight: 79.5 kg (175 lb 4.3 oz)  Body mass index is 30.08 kg/m².    Intake/Output - Last 3 Shifts       05/01 0700 - 05/02 0659 05/02 0700 - 05/03 0659 05/03 0700 - 05/04 0659    I.V. (mL/kg) 1584 (19.9) 1359.4 (17.1)     NG/GT 1420 0     Total Intake(mL/kg) 3004 (37.8) 1359.4 (17.1)     Urine (mL/kg/hr) 2720 (1.4) 2040 (1.1)     Drains 38 680     Stool  100     Total Output 2758 2820     Net +246 -1460.6                  Physical Exam   Cardiovascular: Normal rate and regular rhythm.   Pulmonary/Chest:   Trach in place; no bleeding   Abdominal: Soft. She exhibits no distension.   PEG in place LUQ   Neurological: She is alert.   Following commands   Nursing note and vitals reviewed.      Significant Labs:  Reviewed    Significant Diagnostics:  Reviewed    Assessment/Plan:     Encounter for weaning from ventilator  Ms. Huff is a 60 yo F with PMH of HTN, DMII, CAD, NSTEMI, s/p L5-S1 OLIF with NSGY 4/12 c/b intraoperative left ureteral injury and underwent ureteroureteral anastomoses and ureteral stent placement complicated by sepsis due to E.coli septicemia now s/p ex lap on 4/21 since then patient has been intubated and unable to be weaned off the vent.     - Uncomplicated Trach/PEG placement yesterday  - Surgery will sign off.  Please call with questions        Jd Devi Jr., MD  General Surgery  [facility]

## 2019-05-03 NOTE — NURSING
US of right arm complete notified MD Srinivas. Reassessed rectum and oozing of blood still evident. Notified MD per MD okay to removed BMS. Removed BMS will continue to monitor closely.

## 2019-05-03 NOTE — ASSESSMENT & PLAN NOTE
BG goal 140 - 180    Discontinue IV insulin infusion protocol   Start transition IV insulin infusion with stepdown parameters. Initial rate starting at 1.3 (parked rate overnight).  Start Novolog 3-6 units once patient start consuming regular meals.   Low Dose SQ Insulin Correction Scale.  BG monitoring /HS/0200    ** Please notify Endocrine for any change and/or advance in diet/TF**  ** Please call Endocrine for any BG related issues **    Discharge Recommendations:     TBD.

## 2019-05-03 NOTE — PROGRESS NOTES
[facility]  Infectious Disease  Progress Note    Patient Name: Mariann Huff  MRN: [id number]  Admission Date: 4/20/2019  Length of Stay: 12 days  Attending Physician: Paul Carlson MD  Primary Care Provider: Jasbir Haney MD    Isolation Status: No active isolations  Assessment/Plan:      Sepsis  60 y/o female with HTN, DMII, CAD, NSTEMI, s/p L5-S1 OLIF with NSGY 4/12 c/b intraoperative left ureteral injury and underwent ureteroureteral anastomoses and ureteral stent placement. She was discharged with a correa and MADHURI drain that was removed on 4/16. She was seen by urology and anticipated stent removal in 2-3 months (6/2019).  She presents to ED with AMS and E.coli septicemia found to have air and fluid collection of left anterior prevertebral soft tissue with left ureter involvement. She underwent ex-lap and washout on 4/21. We are consulted for abx recommendations.      Per op note,  There were pocket of purulence noted and evacuated, sent for culture of left retroperitoneum to pole of left kidney. The stent was visible. Posterior to the stent there was a pocket of purulent fluid and exposed hardware along the spinal vertebrae. The tissue along the distal and proximal end of ureter were friable and unhealthy. She underwent left nephrostomy tube placement. The stent was removed and several metal clips were placed on proximal end of the ureteral.     Patient has developed leukocytosis.  OR cultures with Staph Lugdenensis - not currently covered.  Stable and non septic - no obvious source.  Patient does appear to be having diarrhea - C diff negative but suspect may need restesting if WBC increases and no other source found.  QTc long at 480 - considered change to Cipro considered but will add vanc 1st to see if respond to covering staph lugdenensis.  CBC shows no eosinophilia to suggest drug reaction and no rash.    WBC increased and now febrile. Stable non septic.  Repeat BCXs sent and NGTD.  BAL  done but no WBC no organisms seen - NGTD.  Source of fever and leukocytosis unclear but ABD suspected though could be non infectious - drug reaction vs PE/DVT.  CT abdomen with superficial fluid collection - possible seroma and inflammatory change around ureter.  C diff repeated and negative.  Line changes jonathan cvc - removed.  Now w increased temps but WBC normalized.  Vanc trough high and dose adjusted.  Patient stable so suspect best course is to monitor for now.    Recommendations:  1. Continue ceftriaxone 5dr80so and rifampin 300 mg q12hr IV (transition to oral once able) given hardware in place.   2. Continue Vancomycin 15mg/kg IV q12 with trough goal 15-20  3. If no improvement may need aspiration of anterior abdominal fluid collection  4. May need to change abx out of concern for drug reaction and assess for other non infectious causes of fever.  5. Anticipate 6 weeks of IV abx from first negative blood cultures (4/23-6/4/19)  followed by oral abx suppression given retained hardware.   6. If patient decompensates then DC ceftriaxone and start Meropenem.   7.  Discussed with ID staff              Anticipated Disposition: tbd    Thank you for your consult. I will follow-up with patient. Please contact us if you have any additional questions.    POLLY Jerome  Infectious Disease  [facility]    Subjective:     Principal Problem:Ureteral transection of left native kidney    HPI: 60 y/o female with HTN, DMII, CAD, NSTEMI, s/p L5-S1 OLIF with NSGY 4/12 sustained intraoperative laceration and underwent ureteroureteral anastomoses and ureteral stent placement. She was discharged with a correa and MADHURI drain that was removed on 4/16. She was seen by urology and anticipated stent removal in 2-3 months (6/2019).  She presents to ED with AMS and sepsis. She was febrile 103 in ED. WBC 5K on admit. Lactate 4.6. Cath UA with 3+leukocytes, >100 WBcs and many bacteria.     MRI: Postsurgical change of  L5-S1 posterior spinal fusion and anterior interbody fusion with a 4.4 cm fluid fluid collection in the left anterior prevertebral soft tissues at the level of the L5-S1 disc space.  Findings may represent postoperative seroma or evolving hematoma however, urinoma not excluded.  No definite abscess although correlation is advised.    Irregular flow void involving the right common iliac vein, underlying thrombus not excluded.      CT: air collection within the anterior paraspinous soft tissues through which the left ureter courses. Given that the ureter courses through this, communication of the ureter with this collection is not excluded.  There is a ureteral stent within the left ureter.Punctate foci of air in L5-S1.  2. Air within the left renal collecting system, along the left ureter, and within the urinary bladder, correlation advised.    This was not amendable for drainage by IR so she was taken to OR for washout.     She underwent ex-lap of left ureter and left nephrostomy tube placement 4/21/19.   Per op note,  There were pocket of purulence noted and evacuated, sent for culture of left retroperitoneum to pole of left kidney. The stent was visible. Posterior to the stent there was a pocket of purulent fluid and exposed hardware along the spinal vertebrae. The tissue along the distal and proximal end of ureter were friable and unhealthy. She underwent left nephrostomy tube placement. The stent was removed and several metal clips were placed on proximal end of the ureteral. MADHURI drain was placed into left retroperitoneal.     Blood cultures were are positive for E coli..   Urine cultures +E.coli  OR cultures were positive for Staph Ludenensis  She is was treated with zosyn. She is in the ICU, intubated, sedated    ID had seen the patient and there was concern for hardware involvement given proximity and the patient was placed on ceftriaxone and rifampin with plan for 6 weeks of abx.  ID now reconsulted due to  fever and leukocytosis.  Patient denies any abdominal pain , fever, chills or sweats.  Repeat Bblood cultures and urine cultures are no growth.    Interval History: No AEON.  Tmax/current 100.5.  WBC now wnl.  Had tracheostomy.  The patient denies any recent fever, chills, or sweats.      Review of Systems   Constitutional: Negative for activity change, chills, diaphoresis and fever.   Respiratory: Negative for cough, shortness of breath and wheezing.    Cardiovascular: Negative for chest pain.   Gastrointestinal: Negative for abdominal pain, constipation, diarrhea, nausea and vomiting.   Genitourinary: Negative for dysuria, frequency and urgency.   Neurological: Negative for dizziness.   Hematological: Does not bruise/bleed easily.     Objective:     Vital Signs (Most Recent):  Temp: (!) 100.5 °F (38.1 °C) (05/02/19 0705)  Pulse: 94 (05/02/19 0918)  Resp: 16 (04/29/19 0335)  BP: (!) 156/70 (05/02/19 0900)  SpO2: 99 % (05/02/19 0918) Vital Signs (24h Range):  Temp:  [100.1 °F (37.8 °C)-100.6 °F (38.1 °C)] 100.5 °F (38.1 °C)  Pulse:  [] 94  SpO2:  [84 %-100 %] 99 %  BP: (110-186)/(55-93) 156/70     Weight: 79.5 kg (175 lb 4.3 oz)  Body mass index is 30.08 kg/m².    Estimated Creatinine Clearance: 89.3 mL/min (based on SCr of 0.7 mg/dL).    Physical Exam   Constitutional: She is oriented to person, place, and time. She appears well-developed and well-nourished. No distress.       HENT:   Head: Normocephalic and atraumatic.   Cardiovascular: Normal rate, regular rhythm and normal heart sounds. Exam reveals no gallop and no friction rub.   No murmur heard.  Pulmonary/Chest: Effort normal and breath sounds normal. No respiratory distress. She has no wheezes. She has no rales.   Abdominal: Soft. Bowel sounds are normal. She exhibits no distension and no mass. There is no tenderness. There is no rebound and no guarding.   Musculoskeletal: She exhibits no edema.   Neurological: She is alert and oriented to person,  place, and time.   Skin: Skin is warm and dry. She is not diaphoretic.   Psychiatric: She has a normal mood and affect. Her behavior is normal.       Significant Labs:   Blood Culture:   Recent Labs   Lab 04/23/19  0430 04/23/19  0500 04/24/19  0950 04/30/19  0230 04/30/19  0247   LABBLOO No growth after 5 days. No growth after 5 days. No growth after 5 days. No Growth to date  No Growth to date  No Growth to date No Growth to date  No Growth to date  No Growth to date     CBC:   Recent Labs   Lab 05/01/19  0344 05/02/19  0320   WBC 15.10* 12.55   HGB 10.2* 9.1*   HCT 33.7* 29.4*    358*     CMP:   Recent Labs   Lab 05/01/19  0344 05/02/19  0320    135*   K 4.2 4.0   CL 99 98   CO2 27 31*   * 142*   BUN 26* 26*   CREATININE 0.7 0.7   CALCIUM 9.8 9.3   PROT 7.6 7.0   ALBUMIN 1.8* 1.7*   BILITOT 2.0* 1.4*   ALKPHOS 218* 179*   AST 45* 34   ALT 56* 42   ANIONGAP 12 6*   EGFRNONAA >60.0 >60.0     Respiratory Culture:   Recent Labs   Lab 04/30/19  0900   GSRESP <10 epithelial cells per low power field.  No WBC's  No organisms seen     Urine Culture:   Recent Labs   Lab 04/20/19  1711 04/21/19  0640 04/21/19  2256 04/25/19  0252   LABURIN ESCHERICHIA COLI  >100,000 cfu/ml   No growth No significant growth No growth     Urine Studies:   Recent Labs   Lab 04/25/19  0252   COLORU Argelia   APPEARANCEUA Cloudy*   PHUR 5.0   SPECGRAV 1.020   PROTEINUA 1+*   GLUCUA 3+*   KETONESU 1+*   BILIRUBINUA Negative   OCCULTUA 3+*   NITRITE Negative   LEUKOCYTESUR 3+*   RBCUA 25*   WBCUA 55*   BACTERIA Few*   SQUAMEPITHEL 1   HYALINECASTS 0     Wound Culture:   Recent Labs   Lab 04/21/19  0125   LABAERO STAPHYLOCOCCUS LUGDUNENSIS  Rare       All pertinent labs within the past 24 hours have been reviewed.    Significant Imaging: I have reviewed all pertinent imaging results/findings within the past 24 hours.   X-Ray Chest 1 View [[id number]] Resulted: 05/02/19 0806   Order Status: Completed Updated: 05/02/19 0809    Narrative:     EXAMINATION:  XR CHEST 1 VIEW    CLINICAL HISTORY:  difficuly weaning the vent, diuresing daily;    TECHNIQUE:  Single frontal view of the chest was performed.    COMPARISON:  05/01/2019    FINDINGS:  Endotracheal tube tip is seen at the level of the clavicles.  NG tube is seen coursing towards the stomach.  Trachea is patent.  Cardiac silhouette is normal in size.  There is atherosclerosis.  There has been interval development of bilateral opacities.  There is no effusion, pneumothorax or free air below the diaphragm.  There is no acute osseous abnormality.   Impression:       Interval development of multifocal bilateral opacities that could represent multifocal infection or pulmonary edema.      Electronically signed by: David Lo MD  Date: 05/02/2019  Time: 08:06   X-Ray Chest 1 View [[id number]] Resulted: 05/01/19 0744   Order Status: Completed Updated: 05/01/19 0747   Narrative:     EXAMINATION:  XR CHEST 1 VIEW    CLINICAL HISTORY:  difficuly weaning the vent, diuresing daily;    TECHNIQUE:  Single frontal view of the chest was performed.    COMPARISON:  April 30, 2019.    FINDINGS:  ET tube NG tube and monitoring leads all remain.  Heart size pulmonary vessels are similar.  Lungs are hypoaerated.  Accentuation of the interstitial markings.  No pneumothorax.   Impression:       No detrimental change.  Right central venous catheter has been removed.      Electronically signed by: Mk Reilly MD  Date: 05/01/2019  Time: 07:44   CT Chest Abdoment Pelvis With Contrast [[id number]] (Abnormal) Resulted: 05/01/19 0445   Order Status: Completed Updated: 05/01/19 0448   Narrative:     EXAMINATION:  CT CHEST ABDOMEN PELVIS WITH CONTRAST (XPD)    CLINICAL HISTORY:  concern for intraabdominal abscess s/p spinal sugery complicated by ureteral injury;    TECHNIQUE:  Low dose axial images, sagittal and coronal reformations were obtained from the thoracic inlet to the pubic symphysis following the IV  administration of 100 mL of Omnipaque 350 .  Oral contrast was not administered.    COMPARISON:  Chest radiograph 04/30/2019.  CT abdomen pelvis 04/20/2019. CT abdomen pelvis 06/03/2014.    FINDINGS:  There is an ET tube with its tip above the barb.  Enteric tube is present with its tip in the mid pylorus.    Chest:    Base of the neck: Left thyroid lobe 1.4 cm hypodense lesion.  Recommend nonemergent evaluation with thyroid ultrasound if not already performed.    Heart and great vessels: Heart is normal in size with minimal pericardial fluid.  Multivessel coronary atherosclerosis.  Aorta is normal in course and caliber.    Adenopathy: Few scattered mediastinal lymph nodes, none meeting enlargement by CT criteria.    Esophagus: Within normal limits.    Airways: ET tube present.    Lungs: Small volume bilateral pleural effusions with associated compressive atelectasis and consolidation in the bilateral lower lobes.    Abdomen:    Liver: Liver is enlarged measuring 22 cm in the craniocaudal direction.  Diffusely decreased attenuation of the hepatic parenchyma, consistent with steatosis.  No focal hepatic lesion.  Portal vein is patent.    Gallbladder: No calcified gallstones.    Biliary system: No intra or extrahepatic biliary ductal dilatation.    Spleen: Hypoattenuating 2.1 cm lesion in the inferior medial aspect of the spleen is not well visualized on prior CT exams, likely an infarct.    Pancreas: No mass or peripancreatic fat stranding.    Adrenals: Within normal limits.    Kidneys: Normal in size and position. There is a left-sided nephrostomy tube present.  Mild fat stranding around the proximal/mid left ureter with possible surgical clip along its mid course.  Distal left ureter is not well visualized.  There has been resolution of the previously seen periureteral free air.  No hydronephrosis.  There are several bilateral renal hypodensities that are overall stable since CT 06/03/2014 and are favored to  represent renal cysts.  Proximal ureters are nondilated.    Bowel: There is a rectal tube present.  Stomach and small bowel unremarkable.  Colon is within normal limits.  No obstruction or inflammatory changes.    Peritoneum: Minimal fluid in the bilateral pericolic gutters.  Mild volume free fluid in the pelvis.  No focal drainable fluid collection.  No pneumoperitoneum.  There is a left lower quadrant surgical drainage catheter with its tip in the left hemiabdomen abutting a loop of small bowel.    Abdominal wall: There are postsurgical and inflammatory changes in the ventral midline abdominal wall with a focal fluid collection in the midline measuring 2.2 x 4.7 x 6.6 cm (AP x TV x CC).  There is mild body wall edema.    Abdominal Adenopathy: None.    Vasculature: No aneurysm.  Scattered calcified and noncalcified atherosclerotic plaque throughout the abdominal aorta and major branch vessels.    Pelvis:    Urinary bladder: Decompressed and contains a Fontenot catheter.    Female reproductive: Status post hysterectomy.    Pelvis adenopathy: None.    Bones: Postoperative changes of L5-S1 posterior spinal fusion with bilateral pedicle screws.  L5-S1 interbody spacer is present and projects anteriorly outside of the disc space, similar to prior CT.    Miscellaneous: None.   Impression:       In this patient who is status post posterior L5-S1 spinal fusion with subsequent left ureteral trauma, there has been resolution of the large air collection within the anterior paraspinous soft tissues and surrounding the left ureter.  There is persistent left periureteral inflammatory change without a focal drainable fluid collection in the abdomen.  There has been interval placement of a left nephrostomy tube.    Postoperative changes in the ventral abdominal wall with a large focal superficial fluid collection.  This may represent a hematoma/seroma or abscess.    Small volume bilateral pleural effusions with associated compressive  atelectasis and bilateral lower lobe consolidation/atelectasis.    Diffuse body wall edema.    Nonspecific wedge-shaped splenic hypodensity in the medial pole, not well seen on prior exams, and favored to represent a small splenic infarct or less likely splenic cyst.    1.4 cm hypodense lesion in the left lobe of the thyroid.  Recommend further nonemergent evaluation with thyroid ultrasound if not already performed.    Several bilateral renal hypodensities that are all stable and are likely cysts.    Hepatomegaly.    Additional stable findings, as above.    This report was flagged in Epic as abnormal.    Electronically signed by resident: Mk Marr  Date: 05/01/2019  Time: 02:15    Electronically signed by: Weston Stephens MD  Date: 05/01/2019  Time: 04:45   X-Ray Chest 1 View [[id number]] Resulted: 04/30/19 0945   Order Status: Completed Updated: 04/30/19 0947   Narrative:     EXAMINATION:  XR CHEST 1 VIEW    CLINICAL HISTORY:  difficuly weaning the vent, diuresing daily;    TECHNIQUE:  Single frontal view of the chest was performed.    COMPARISON:  04/29/2019    FINDINGS:  There is a right neck CVP line with tip projected over SVC.  Endotracheal tube is seen with tip above the level of the barb.  NG tube is seen coursing towards the stomach.  Trachea is patent.  Cardiac silhouette appears unchanged.  There is atherosclerosis.  Lung volumes are symmetric.  Increased pulmonary haziness is suggestive of mild pulmonary edema.  No effusion, pneumothorax or free air below the diaphragm.  No acute osseous abnormality.   Impression:       Findings suggestive of mild pulmonary edema which appears improved from prior.      Electronically signed by: David Lo MD  Date: 04/30/2019  Time: 09:45   X-Ray Chest 1 View [[id number]] Resulted: 04/29/19 0750   Order Status: Completed Updated: 04/29/19 0753   Narrative:     EXAMINATION:  XR CHEST 1 VIEW    CLINICAL HISTORY:  intubated;    FINDINGS:  Tubes and lines a good  position.  Heart size is normal there is moderate edema and no change.   Impression:       See above    Pulmonary edema pneumonia aspiration or sepsis.      Electronically signed by: Mart Norris MD  Date: 04/29/2019  Time: 07:50   X-Ray Chest 1 View [[id number]] Resulted: 04/28/19 0932   Order Status: Completed Updated: 04/28/19 0935   Narrative:     EXAMINATION:  XR CHEST 1 VIEW    CLINICAL HISTORY:  intubated;.    TECHNIQUE:  Single frontal portable view of the chest was performed.    COMPARISON:  April 27, 2019 at 06:29    FINDINGS:  Support devices: ET tube, NG tube, and right internal jugular central venous catheter remain.    There has not been any detrimental change since April 27, 2019 at 06:29.   Impression:       No detrimental change.      Electronically signed by: Sarah Mora MD  Date: 04/28/2019  Time: 09:32   X-Ray Chest 1 View [[id number]] Resulted: 04/27/19 1350   Order Status: Completed Updated: 04/27/19 1352   Narrative:     EXAMINATION:  XR CHEST 1 VIEW    CLINICAL HISTORY:  intubated;.    TECHNIQUE:  Single frontal portable view of the chest was performed.    COMPARISON:  April 26, 2019 at 05:54    FINDINGS:  Support devices: ET tube, NG tube, and right internal jugular central venous catheter remain.    There has not been any detrimental change since April 26, 2019 at 05:54.   Impression:       No detrimental change.      Electronically signed by: Sarah Mora MD  Date: 04/27/2019  Time: 13:50   X-Ray Chest 1 View [[id number]] Resulted: 04/26/19 0741   Order Status: Completed Updated: 04/26/19 0744   Narrative:     EXAMINATION:  XR CHEST 1 VIEW    CLINICAL HISTORY:  Intubated. Eval pneumonia.;    COMPARISON:  Comparison is made to 04/25/2019.    FINDINGS:  Endotracheal tube tip lies 1.5 cm above the level of the barb.  There has been partial interval clearing of airspace consolidation on both sides since 04/25/2019, with no significant detrimental interval change in the appearance of  chest since that time observed.  No pneumothorax.   Impression:       As above      Electronically signed by: Christian Moreno MD  Date: 04/26/2019  Time: 07:41

## 2019-05-03 NOTE — PLAN OF CARE
Problem: Adult Inpatient Plan of Care  Goal: Plan of Care Review  Outcome: Ongoing (interventions implemented as appropriate)  Patient free of falls/traumas/injuries. Plan of care discussed with patient and family.  Neuro: AAOx4, follows commands  Cardiac: SR on telemetry with HR in 60-70s, Maps >65 and SBP <180.  Respiratory: On trach collar with 50% O2.  GI/: Fontenot in place with UOP >20 ml/hr, Nephrostomy in place with UOP 40ml/hr. BMS in place.  Skin: PEG in LLQ CDI, Mid abdominal incision open to air, CDI, Bilateral mid back Incision staples CDI, and L flank Nephrostomy site, CDI.   Plan: US of right arm due to increased swelling pending. Midline placed due to poor venous access and unable to obtain labs.

## 2019-05-03 NOTE — SUBJECTIVE & OBJECTIVE
"Interval HPI:   Overnight events:  BG is now within goal on intensive Insulin infusion. Rate has stabilized. NAOEN.     Eating:   NPO  Nausea: No  Hypoglycemia and intervention: No  Fever: No  TPN and/or TF: No  If yes, type of TF/TPN and rate: None    BP (!) 165/76 (BP Location: Right arm, Patient Position: Lying)   Pulse 106   Temp 99.1 °F (37.3 °C) (Oral)   Resp (!) 25   Ht 5' 4" (1.626 m)   Wt 79.5 kg (175 lb 4.3 oz)   SpO2 98%   Breastfeeding? No   BMI 30.08 kg/m²     Labs Reviewed and Include    Recent Labs   Lab 05/03/19  0243   *   CALCIUM 9.1   ALBUMIN 1.7*   PROT 6.8      K 3.7   CO2 29   CL 98   BUN 23*   CREATININE 0.8   ALKPHOS 160*   ALT 33   AST 30   BILITOT 1.6*     Lab Results   Component Value Date    WBC 11.57 05/03/2019    HGB 8.3 (L) 05/03/2019    HCT 26.9 (L) 05/03/2019    MCV 88 05/03/2019    PLT SEE COMMENT 05/03/2019     No results for input(s): TSH, FREET4 in the last 168 hours.  Lab Results   Component Value Date    HGBA1C 10.8 (H) 02/19/2019       Nutritional status:   Body mass index is 30.08 kg/m².  Lab Results   Component Value Date    ALBUMIN 1.7 (L) 05/03/2019    ALBUMIN 1.7 (L) 05/02/2019    ALBUMIN 1.8 (L) 05/01/2019     No results found for: PREALBUMIN    Estimated Creatinine Clearance: 78.2 mL/min (based on SCr of 0.8 mg/dL).    Accu-Checks  Recent Labs     05/02/19  1151 05/02/19  1257 05/02/19  1522 05/02/19  1738 05/02/19  1926 05/02/19  2211 05/03/19  0010 05/03/19  0208 05/03/19  0515 05/03/19  0728   POCTGLUCOSE 131* 171* 168* 157* 129* 123* 168* 176* 173* 168*       Current Medications and/or Treatments Impacting Glycemic Control  Immunotherapy:    Immunosuppressants     None        Steroids:   Hormones (From admission, onward)    None        Pressors:    Autonomic Drugs (From admission, onward)    None        Hyperglycemia/Diabetes Medications:   Antihyperglycemics (From admission, onward)    Start     Stop Route Frequency Ordered    05/01/19 1015  " insulin regular (Humulin R) 100 Units in sodium chloride 0.9% 100 mL infusion     Question:  Insulin Rate Adjustment (DO NOT MODIFY ANSWER)  Answer:  [email or web address]    -- IV Continuous 05/01/19 0910

## 2019-05-03 NOTE — PROGRESS NOTES
"[facility]  Endocrinology  Progress Note    Admit Date: 4/20/2019     Reason for Consult: Management of T2DM, Hyperglycemia     Surgical Procedure and Date:       04/21/2019:         1. Exploratory laparotomy        2. Ligation of left ureter        3. Removal of left JJ ureteral stent      Diabetes diagnosis year: ANDRE    Home Diabetes Medications:  ANDRE  Toujeo 50 BID  Invokana 300mg daily   Novolog 35 units AM, 45 units PM, 35 units PM    How often checking glucose at home? ANDRE   BG readings on regimen: ANDRE  Hypoglycemia on the regimen?  ANDRE  Missed doses on regimen?  ANDRE    Diabetes Complications include:     Hyperglycemia and Diabetic retinopathy     Complicating diabetes co morbidities:   History of MI and MURTAZA, CAD, HLD    HPI:   Patient is a 59 y.o. female with a diagnosis of HTN, HLN, DM type 2, nonproliferative diabetic renopathy, CAD, NSTEMI, and back pain who presents to the ED with a complaint of altered mental status on 04/20/2019. Is now s/p Exploratory laparotomy, Ligation of left ureter, and Removal of left JJ ureteral stent. Endocrinology consulted to manage DM2/Hyperglycemia.    Lab Results   Component Value Date    HGBA1C 10.8 (H) 02/19/2019       Interval HPI:   Overnight events:  BG is now within goal on intensive Insulin infusion. Rate has stabilized. NAOEN.     Eating:   NPO  Nausea: No  Hypoglycemia and intervention: No  Fever: No  TPN and/or TF: No  If yes, type of TF/TPN and rate: None    BP (!) 165/76 (BP Location: Right arm, Patient Position: Lying)   Pulse 106   Temp 99.1 °F (37.3 °C) (Oral)   Resp (!) 25   Ht 5' 4" (1.626 m)   Wt 79.5 kg (175 lb 4.3 oz)   SpO2 98%   Breastfeeding? No   BMI 30.08 kg/m²      Labs Reviewed and Include    Recent Labs   Lab 05/03/19  0243   *   CALCIUM 9.1   ALBUMIN 1.7*   PROT 6.8      K 3.7   CO2 29   CL 98   BUN 23*   CREATININE 0.8   ALKPHOS 160*   ALT 33   AST 30   BILITOT 1.6*     Lab Results   Component Value " Date    WBC 11.57 05/03/2019    HGB 8.3 (L) 05/03/2019    HCT 26.9 (L) 05/03/2019    MCV 88 05/03/2019    PLT SEE COMMENT 05/03/2019     No results for input(s): TSH, FREET4 in the last 168 hours.  Lab Results   Component Value Date    HGBA1C 10.8 (H) 02/19/2019       Nutritional status:   Body mass index is 30.08 kg/m².  Lab Results   Component Value Date    ALBUMIN 1.7 (L) 05/03/2019    ALBUMIN 1.7 (L) 05/02/2019    ALBUMIN 1.8 (L) 05/01/2019     No results found for: PREALBUMIN    Estimated Creatinine Clearance: 78.2 mL/min (based on SCr of 0.8 mg/dL).    Accu-Checks  Recent Labs     05/02/19  1151 05/02/19  1257 05/02/19  1522 05/02/19  1738 05/02/19  1926 05/02/19  2211 05/03/19  0010 05/03/19  0208 05/03/19  0515 05/03/19  0728   POCTGLUCOSE 131* 171* 168* 157* 129* 123* 168* 176* 173* 168*       Current Medications and/or Treatments Impacting Glycemic Control  Immunotherapy:    Immunosuppressants     None        Steroids:   Hormones (From admission, onward)    None        Pressors:    Autonomic Drugs (From admission, onward)    None        Hyperglycemia/Diabetes Medications:   Antihyperglycemics (From admission, onward)    Start     Stop Route Frequency Ordered    05/01/19 1015  insulin regular (Humulin R) 100 Units in sodium chloride 0.9% 100 mL infusion     Question:  Insulin Rate Adjustment (DO NOT MODIFY ANSWER)  Answer:  [email or web address]    -- IV Continuous 05/01/19 0910          ASSESSMENT and PLAN    * Ureteral transection of left native kidney  Managed per primary team  Avoid hypoglycemia        Type 2 diabetes mellitus with diabetic peripheral angiopathy without gangrene  BG goal 140 - 180    Discontinue IV insulin infusion protocol   Start transition IV insulin infusion with stepdown parameters. Initial rate starting at 1.3 (parked rate overnight).  Start Novolog 3-6 units once patient start consuming regular meals.   Low Dose SQ Insulin  Correction Scale.  BG monitoring AC/HS/0200    ** Please notify Endocrine for any change and/or advance in diet/TF**  ** Please call Endocrine for any BG related issues **    Discharge Recommendations:     TBD.             CAD (coronary artery disease)    Managed per primary team  Condition may cause insulin resistance       PAPA (acute kidney injury)  Caution with insulin stacking        HLD (hyperlipidemia)  Managed per primary team  Condition may cause insulin resistance         Sleep apnea  May affect BG readings if uncontrolled            Uriah Holder NP  Endocrinology  [facility]

## 2019-05-03 NOTE — ASSESSMENT & PLAN NOTE
60 y/o female with HTN, DMII, CAD, NSTEMI, s/p L5-S1 OLIF with NSGY 4/12 c/b intraoperative left ureteral injury and underwent ureteroureteral anastomoses and ureteral stent placement. She was discharged with a correa and MADHURI drain that was removed on 4/16. She was seen by urology and anticipated stent removal in 2-3 months (6/2019).  She presents to ED with AMS and E.coli septicemia found to have air and fluid collection of left anterior prevertebral soft tissue with left ureter involvement. She underwent ex-lap and washout on 4/21. We are consulted for abx recommendations.      Per op note,  There were pocket of purulence noted and evacuated, sent for culture of left retroperitoneum to pole of left kidney. The stent was visible. Posterior to the stent there was a pocket of purulent fluid and exposed hardware along the spinal vertebrae. The tissue along the distal and proximal end of ureter were friable and unhealthy. She underwent left nephrostomy tube placement. The stent was removed and several metal clips were placed on proximal end of the ureteral.     Patient has developed leukocytosis.  OR cultures with Staph Lugdenensis - not currently covered.  Stable and non septic - no obvious source.  Patient does appear to be having diarrhea - C diff negative but suspect may need restesting if WBC increases and no other source found.  QTc long at 480 - considered change to Cipro considered but will add vanc 1st to see if respond to covering staph lugdenensis.  CBC shows no eosinophilia to suggest drug reaction and no rash.    WBC increased and now febrile. Stable non septic.  Repeat BCXs sent and NGTD.  BAL done but no WBC no organisms seen - NGTD.  Source of fever and leukocytosis unclear but ABD suspected though could be non infectious - drug reaction vs PE/DVT.  CT abdomen with superficial fluid collection - possible seroma and inflammatory change around ureter.  C diff repeated and negative.  Line changes jonathan cvc -  removed.  Now w increased temps but WBC normalized.  Vanc trough high and dose adjusted.  Patient stable so suspect best course is to monitor for now.    Recommendations:  1. Continue ceftriaxone [id number] and rifampin 300 mg q12hr IV (transition to oral once able) given hardware in place.   2. Continue Vancomycin 15mg/kg IV q12 with trough goal 15-20  3. If no improvement may need aspiration of anterior abdominal fluid collection  4. May need to change abx out of concern for drug reaction and assess for other non infectious causes of fever.  5. Anticipate 6 weeks of IV abx from first negative blood cultures (4/23-6/4/19)  followed by oral abx suppression given retained hardware.   6. If patient decompensates then DC ceftriaxone and start Meropenem.   7.  Discussed with ID staff

## 2019-05-04 LAB
ALBUMIN SERPL BCP-MCNC: 1.7 G/DL (ref 3.5–5.2)
ALP SERPL-CCNC: 143 U/L (ref 55–135)
ALT SERPL W/O P-5'-P-CCNC: 26 U/L (ref 10–44)
ANION GAP SERPL CALC-SCNC: 11 MMOL/L (ref 8–16)
APTT BLDCRRT: 25.3 SEC (ref 21–32)
APTT BLDCRRT: 25.9 SEC (ref 21–32)
APTT BLDCRRT: 26.7 SEC (ref 21–32)
APTT BLDCRRT: 26.7 SEC (ref 21–32)
AST SERPL-CCNC: 23 U/L (ref 10–40)
BASOPHILS # BLD AUTO: 0.09 K/UL (ref 0–0.2)
BASOPHILS # BLD AUTO: 0.09 K/UL (ref 0–0.2)
BASOPHILS NFR BLD: 0.8 % (ref 0–1.9)
BASOPHILS NFR BLD: 0.8 % (ref 0–1.9)
BILIRUB SERPL-MCNC: 1.8 MG/DL (ref 0.1–1)
BUN SERPL-MCNC: 17 MG/DL (ref 6–20)
CALCIUM SERPL-MCNC: 9.2 MG/DL (ref 8.7–10.5)
CHLORIDE SERPL-SCNC: 99 MMOL/L (ref 95–110)
CO2 SERPL-SCNC: 31 MMOL/L (ref 23–29)
CREAT SERPL-MCNC: 0.7 MG/DL (ref 0.5–1.4)
DIFFERENTIAL METHOD: ABNORMAL
DIFFERENTIAL METHOD: ABNORMAL
EOSINOPHIL # BLD AUTO: 0.4 K/UL (ref 0–0.5)
EOSINOPHIL # BLD AUTO: 0.4 K/UL (ref 0–0.5)
EOSINOPHIL NFR BLD: 3 % (ref 0–8)
EOSINOPHIL NFR BLD: 3 % (ref 0–8)
ERYTHROCYTE [DISTWIDTH] IN BLOOD BY AUTOMATED COUNT: 13.2 % (ref 11.5–14.5)
ERYTHROCYTE [DISTWIDTH] IN BLOOD BY AUTOMATED COUNT: 13.2 % (ref 11.5–14.5)
EST. GFR  (AFRICAN AMERICAN): >60 ML/MIN/1.73 M^2
EST. GFR  (NON AFRICAN AMERICAN): >60 ML/MIN/1.73 M^2
GLUCOSE SERPL-MCNC: 125 MG/DL (ref 70–110)
HCT VFR BLD AUTO: 26.9 % (ref 37–48.5)
HCT VFR BLD AUTO: 26.9 % (ref 37–48.5)
HGB BLD-MCNC: 8.3 G/DL (ref 12–16)
HGB BLD-MCNC: 8.3 G/DL (ref 12–16)
IMM GRANULOCYTES # BLD AUTO: 0.06 K/UL (ref 0–0.04)
IMM GRANULOCYTES # BLD AUTO: 0.06 K/UL (ref 0–0.04)
IMM GRANULOCYTES NFR BLD AUTO: 0.5 % (ref 0–0.5)
IMM GRANULOCYTES NFR BLD AUTO: 0.5 % (ref 0–0.5)
INR PPP: 1 (ref 0.8–1.2)
LYMPHOCYTES # BLD AUTO: 1.3 K/UL (ref 1–4.8)
LYMPHOCYTES # BLD AUTO: 1.3 K/UL (ref 1–4.8)
LYMPHOCYTES NFR BLD: 10.7 % (ref 18–48)
LYMPHOCYTES NFR BLD: 10.7 % (ref 18–48)
MAGNESIUM SERPL-MCNC: 1.9 MG/DL (ref 1.6–2.6)
MCH RBC QN AUTO: 26.7 PG (ref 27–31)
MCH RBC QN AUTO: 26.7 PG (ref 27–31)
MCHC RBC AUTO-ENTMCNC: 30.9 G/DL (ref 32–36)
MCHC RBC AUTO-ENTMCNC: 30.9 G/DL (ref 32–36)
MCV RBC AUTO: 87 FL (ref 82–98)
MCV RBC AUTO: 87 FL (ref 82–98)
MONOCYTES # BLD AUTO: 1.4 K/UL (ref 0.3–1)
MONOCYTES # BLD AUTO: 1.4 K/UL (ref 0.3–1)
MONOCYTES NFR BLD: 11.7 % (ref 4–15)
MONOCYTES NFR BLD: 11.7 % (ref 4–15)
NEUTROPHILS # BLD AUTO: 8.6 K/UL (ref 1.8–7.7)
NEUTROPHILS # BLD AUTO: 8.6 K/UL (ref 1.8–7.7)
NEUTROPHILS NFR BLD: 73.3 % (ref 38–73)
NEUTROPHILS NFR BLD: 73.3 % (ref 38–73)
NRBC BLD-RTO: 0 /100 WBC
NRBC BLD-RTO: 0 /100 WBC
PHOSPHATE SERPL-MCNC: 2.1 MG/DL (ref 2.7–4.5)
PLATELET # BLD AUTO: 529 K/UL (ref 150–350)
PLATELET # BLD AUTO: 529 K/UL (ref 150–350)
PMV BLD AUTO: 10.8 FL (ref 9.2–12.9)
PMV BLD AUTO: 10.8 FL (ref 9.2–12.9)
POCT GLUCOSE: 138 MG/DL (ref 70–110)
POCT GLUCOSE: 147 MG/DL (ref 70–110)
POCT GLUCOSE: 157 MG/DL (ref 70–110)
POCT GLUCOSE: 173 MG/DL (ref 70–110)
POCT GLUCOSE: 175 MG/DL (ref 70–110)
POCT GLUCOSE: 189 MG/DL (ref 70–110)
POCT GLUCOSE: 200 MG/DL (ref 70–110)
POTASSIUM SERPL-SCNC: 3.1 MMOL/L (ref 3.5–5.1)
POTASSIUM SERPL-SCNC: 3.2 MMOL/L (ref 3.5–5.1)
PROT SERPL-MCNC: 6.6 G/DL (ref 6–8.4)
PROTHROMBIN TIME: 10.5 SEC (ref 9–12.5)
RBC # BLD AUTO: 3.11 M/UL (ref 4–5.4)
RBC # BLD AUTO: 3.11 M/UL (ref 4–5.4)
SODIUM SERPL-SCNC: 141 MMOL/L (ref 136–145)
WBC # BLD AUTO: 11.78 K/UL (ref 3.9–12.7)
WBC # BLD AUTO: 11.78 K/UL (ref 3.9–12.7)

## 2019-05-04 PROCEDURE — 84100 ASSAY OF PHOSPHORUS: CPT

## 2019-05-04 PROCEDURE — P9045 ALBUMIN (HUMAN), 5%, 250 ML: HCPCS | Mod: JG | Performed by: STUDENT IN AN ORGANIZED HEALTH CARE EDUCATION/TRAINING PROGRAM

## 2019-05-04 PROCEDURE — 25000003 PHARM REV CODE 250: Performed by: NURSE PRACTITIONER

## 2019-05-04 PROCEDURE — 94761 N-INVAS EAR/PLS OXIMETRY MLT: CPT

## 2019-05-04 PROCEDURE — 63600175 PHARM REV CODE 636 W HCPCS: Performed by: STUDENT IN AN ORGANIZED HEALTH CARE EDUCATION/TRAINING PROGRAM

## 2019-05-04 PROCEDURE — 25000003 PHARM REV CODE 250: Performed by: STUDENT IN AN ORGANIZED HEALTH CARE EDUCATION/TRAINING PROGRAM

## 2019-05-04 PROCEDURE — 84132 ASSAY OF SERUM POTASSIUM: CPT

## 2019-05-04 PROCEDURE — 99233 PR SUBSEQUENT HOSPITAL CARE,LEVL III: ICD-10-PCS | Mod: 24,GC,, | Performed by: SURGERY

## 2019-05-04 PROCEDURE — 85610 PROTHROMBIN TIME: CPT

## 2019-05-04 PROCEDURE — 25000003 PHARM REV CODE 250: Performed by: UROLOGY

## 2019-05-04 PROCEDURE — 85730 THROMBOPLASTIN TIME PARTIAL: CPT

## 2019-05-04 PROCEDURE — 85025 COMPLETE CBC W/AUTO DIFF WBC: CPT

## 2019-05-04 PROCEDURE — 25000003 PHARM REV CODE 250: Performed by: PHYSICIAN ASSISTANT

## 2019-05-04 PROCEDURE — 99900035 HC TECH TIME PER 15 MIN (STAT)

## 2019-05-04 PROCEDURE — 99900026 HC AIRWAY MAINTENANCE (STAT)

## 2019-05-04 PROCEDURE — 99232 PR SUBSEQUENT HOSPITAL CARE,LEVL II: ICD-10-PCS | Mod: ,,, | Performed by: NURSE PRACTITIONER

## 2019-05-04 PROCEDURE — 80053 COMPREHEN METABOLIC PANEL: CPT

## 2019-05-04 PROCEDURE — 63600175 PHARM REV CODE 636 W HCPCS: Performed by: UROLOGY

## 2019-05-04 PROCEDURE — 20000000 HC ICU ROOM

## 2019-05-04 PROCEDURE — 83735 ASSAY OF MAGNESIUM: CPT

## 2019-05-04 PROCEDURE — C9113 INJ PANTOPRAZOLE SODIUM, VIA: HCPCS | Performed by: ANESTHESIOLOGY

## 2019-05-04 PROCEDURE — 63600175 PHARM REV CODE 636 W HCPCS: Mod: JG | Performed by: STUDENT IN AN ORGANIZED HEALTH CARE EDUCATION/TRAINING PROGRAM

## 2019-05-04 PROCEDURE — 27000221 HC OXYGEN, UP TO 24 HOURS

## 2019-05-04 PROCEDURE — 99232 SBSQ HOSP IP/OBS MODERATE 35: CPT | Mod: ,,, | Performed by: NURSE PRACTITIONER

## 2019-05-04 PROCEDURE — 63600175 PHARM REV CODE 636 W HCPCS: Performed by: ANESTHESIOLOGY

## 2019-05-04 PROCEDURE — 85730 THROMBOPLASTIN TIME PARTIAL: CPT | Mod: 91

## 2019-05-04 PROCEDURE — 25000003 PHARM REV CODE 250: Performed by: ANESTHESIOLOGY

## 2019-05-04 PROCEDURE — 92597 ORAL SPEECH DEVICE EVAL: CPT

## 2019-05-04 PROCEDURE — 99233 SBSQ HOSP IP/OBS HIGH 50: CPT | Mod: 24,GC,, | Performed by: SURGERY

## 2019-05-04 PROCEDURE — 63600175 PHARM REV CODE 636 W HCPCS: Performed by: NURSE PRACTITIONER

## 2019-05-04 PROCEDURE — 63600175 PHARM REV CODE 636 W HCPCS: Performed by: PHYSICIAN ASSISTANT

## 2019-05-04 RX ORDER — ALBUMIN HUMAN 50 G/1000ML
12.5 SOLUTION INTRAVENOUS ONCE
Status: DISCONTINUED | OUTPATIENT
Start: 2019-05-04 | End: 2019-05-04

## 2019-05-04 RX ORDER — SODIUM,POTASSIUM PHOSPHATES 280-250MG
2 POWDER IN PACKET (EA) ORAL EVERY 4 HOURS
Status: COMPLETED | OUTPATIENT
Start: 2019-05-04 | End: 2019-05-04

## 2019-05-04 RX ORDER — ALBUMIN HUMAN 50 G/1000ML
12.5 SOLUTION INTRAVENOUS ONCE
Status: COMPLETED | OUTPATIENT
Start: 2019-05-04 | End: 2019-05-04

## 2019-05-04 RX ORDER — POTASSIUM CHLORIDE 20 MEQ/15ML
60 SOLUTION ORAL ONCE
Status: COMPLETED | OUTPATIENT
Start: 2019-05-04 | End: 2019-05-04

## 2019-05-04 RX ORDER — POTASSIUM CHLORIDE 20 MEQ/15ML
40 SOLUTION ORAL ONCE
Status: COMPLETED | OUTPATIENT
Start: 2019-05-04 | End: 2019-05-04

## 2019-05-04 RX ORDER — RAMELTEON 8 MG/1
8 TABLET ORAL NIGHTLY PRN
Status: DISCONTINUED | OUTPATIENT
Start: 2019-05-04 | End: 2019-05-08

## 2019-05-04 RX ADMIN — VANCOMYCIN HYDROCHLORIDE 1000 MG: 1 INJECTION, POWDER, LYOPHILIZED, FOR SOLUTION INTRAVENOUS at 08:05

## 2019-05-04 RX ADMIN — INSULIN ASPART 1 UNITS: 100 INJECTION, SOLUTION INTRAVENOUS; SUBCUTANEOUS at 12:05

## 2019-05-04 RX ADMIN — POTASSIUM CHLORIDE 60 MEQ: 20 SOLUTION ORAL at 06:05

## 2019-05-04 RX ADMIN — FUROSEMIDE 40 MG: 10 INJECTION, SOLUTION INTRAMUSCULAR; INTRAVENOUS at 08:05

## 2019-05-04 RX ADMIN — POTASSIUM CHLORIDE 40 MEQ: 20 SOLUTION ORAL at 02:05

## 2019-05-04 RX ADMIN — INSULIN ASPART 1 UNITS: 100 INJECTION, SOLUTION INTRAVENOUS; SUBCUTANEOUS at 05:05

## 2019-05-04 RX ADMIN — POTASSIUM & SODIUM PHOSPHATES POWDER PACK 280-160-250 MG 2 PACKET: 280-160-250 PACK at 06:05

## 2019-05-04 RX ADMIN — PANTOPRAZOLE SODIUM 40 MG: 40 INJECTION, POWDER, LYOPHILIZED, FOR SOLUTION INTRAVENOUS at 08:05

## 2019-05-04 RX ADMIN — PSYLLIUM HUSK 1 PACKET: 3.4 POWDER ORAL at 07:05

## 2019-05-04 RX ADMIN — CHLORHEXIDINE GLUCONATE 0.12% ORAL RINSE 15 ML: 1.2 LIQUID ORAL at 09:05

## 2019-05-04 RX ADMIN — SODIUM CHLORIDE 1.5 UNITS/HR: 9 INJECTION, SOLUTION INTRAVENOUS at 02:05

## 2019-05-04 RX ADMIN — VANCOMYCIN HYDROCHLORIDE 1000 MG: 1 INJECTION, POWDER, LYOPHILIZED, FOR SOLUTION INTRAVENOUS at 07:05

## 2019-05-04 RX ADMIN — POTASSIUM & SODIUM PHOSPHATES POWDER PACK 280-160-250 MG 2 PACKET: 280-160-250 PACK at 02:05

## 2019-05-04 RX ADMIN — INSULIN ASPART 1 UNITS: 100 INJECTION, SOLUTION INTRAVENOUS; SUBCUTANEOUS at 11:05

## 2019-05-04 RX ADMIN — CEFTRIAXONE 2 G: 2 INJECTION, SOLUTION INTRAVENOUS at 05:05

## 2019-05-04 RX ADMIN — CHLORHEXIDINE GLUCONATE 0.12% ORAL RINSE 15 ML: 1.2 LIQUID ORAL at 08:05

## 2019-05-04 RX ADMIN — HEPARIN SODIUM AND DEXTROSE 24 UNITS/KG/HR: 10000; 5 INJECTION INTRAVENOUS at 05:05

## 2019-05-04 RX ADMIN — RIFAMPIN 300 MG: 600 INJECTION, POWDER, LYOPHILIZED, FOR SOLUTION INTRAVENOUS at 02:05

## 2019-05-04 RX ADMIN — POTASSIUM & SODIUM PHOSPHATES POWDER PACK 280-160-250 MG 2 PACKET: 280-160-250 PACK at 09:05

## 2019-05-04 RX ADMIN — INSULIN ASPART 1 UNITS: 100 INJECTION, SOLUTION INTRAVENOUS; SUBCUTANEOUS at 08:05

## 2019-05-04 RX ADMIN — ALBUMIN HUMAN 12.5 G: 0.05 INJECTION, SOLUTION INTRAVENOUS at 05:05

## 2019-05-04 NOTE — SUBJECTIVE & OBJECTIVE
"Interval HPI:   Overnight events:   BG within goal on IV and SQ insulin infusion. However, Patient has since started TF.     Eating:   <25%  Nausea: No  Hypoglycemia and intervention: No  Fever: No  TPN and/or TF: Yes  If yes, type of TF/TPN and rate: 20 ml/hr. (goal of 40 ml/hr).    BP (!) 147/65   Pulse 104   Temp 99.5 °F (37.5 °C) (Oral)   Resp 18   Ht 5' 4" (1.626 m)   Wt 79.5 kg (175 lb 4.3 oz)   SpO2 96%   Breastfeeding? No   BMI 30.08 kg/m²     Labs Reviewed and Include    Recent Labs   Lab 05/04/19  0438   *   CALCIUM 9.2   ALBUMIN 1.7*   PROT 6.6      K 3.1*   CO2 31*   CL 99   BUN 17   CREATININE 0.7   ALKPHOS 143*   ALT 26   AST 23   BILITOT 1.8*     Lab Results   Component Value Date    WBC 11.78 05/04/2019    WBC 11.78 05/04/2019    HGB 8.3 (L) 05/04/2019    HGB 8.3 (L) 05/04/2019    HCT 26.9 (L) 05/04/2019    HCT 26.9 (L) 05/04/2019    MCV 87 05/04/2019    MCV 87 05/04/2019     (H) 05/04/2019     (H) 05/04/2019     No results for input(s): TSH, FREET4 in the last 168 hours.  Lab Results   Component Value Date    HGBA1C 10.8 (H) 02/19/2019       Nutritional status:   Body mass index is 30.08 kg/m².  Lab Results   Component Value Date    ALBUMIN 1.7 (L) 05/04/2019    ALBUMIN 1.7 (L) 05/03/2019    ALBUMIN 1.7 (L) 05/02/2019     No results found for: PREALBUMIN    Estimated Creatinine Clearance: 89.3 mL/min (based on SCr of 0.7 mg/dL).    Accu-Checks  Recent Labs     05/02/19  2211 05/03/19  0010 05/03/19  0208 05/03/19  0515 05/03/19  0728 05/03/19  1157 05/03/19  1719 05/03/19  2102 05/04/19  0204 05/04/19  0824   POCTGLUCOSE 123* 168* 176* 173* 168* 172* 150* 147* 138* 157*       Current Medications and/or Treatments Impacting Glycemic Control  Immunotherapy:    Immunosuppressants     None        Steroids:   Hormones (From admission, onward)    None        Pressors:    Autonomic Drugs (From admission, onward)    None        Hyperglycemia/Diabetes Medications: "   Antihyperglycemics (From admission, onward)    Start     Stop Route Frequency Ordered    05/03/19 1130  insulin aspart U-100 pen 3-6 Units      -- SubQ 3 times daily with meals 05/03/19 0836    05/03/19 0945  insulin regular (Humulin R) 100 Units in sodium chloride 0.9% 100 mL infusion      -- IV Continuous 05/03/19 0836    05/03/19 0935  insulin aspart U-100 pen 0-5 Units      -- SubQ As needed (PRN) 05/03/19 0836

## 2019-05-04 NOTE — PROGRESS NOTES
Subjective/Objective  Interval History/Significant Events: Rectal bleed overnight, removed rectal tube. No further bloody BMs. RUE swelling, found to have DVT within the RIJ and R subclavian veins, hep gtt started overnight. Otherwise doing well. Tolerating trach collar since 5/3    Follow-up For: Procedure(s) (LRB):  Creation, Nephrostomy, Percutaneous (Left)    Post-Operative Day: 13 Days Post-Op     Objective:     Vital Signs (Most Recent):  Temp: 99.6 °F (37.6 °C) (05/04/19 1100)  Pulse: (!) 111 (05/04/19 1100)  Resp: 20 (05/04/19 1000)  BP: (!) 177/83 (05/04/19 1100)  SpO2: 99 % (05/04/19 1100) Vital Signs (24h Range):  Temp:  [99.5 °F (37.5 °C)-99.8 °F (37.7 °C)] 99.6 °F (37.6 °C)  Pulse:  [] 111  Resp:  [18-25] 20  SpO2:  [94 %-100 %] 99 %  BP: (117-180)/() 177/83     Weight: 79.5 kg (175 lb 4.3 oz)  Body mass index is 30.08 kg/m².      Intake/Output Summary (Last 24 hours) at 5/4/2019 1120  Last data filed at 5/4/2019 1100  Gross per 24 hour   Intake 1783.82 ml   Output 1952 ml   Net -168.18 ml       Physical Exam   Constitutional: She appears well-developed and well-nourished.   HENT:   Head: Normocephalic and atraumatic.   Tracheostomy in place   Eyes: Right eye exhibits no discharge. Left eye exhibits no discharge.   Neck: Normal range of motion. Neck supple.   Cardiovascular: Normal rate and regular rhythm.   Pulmonary/Chest: Effort normal. No respiratory distress.   Trach collar.    Abdominal:   Midline incision c/d/i.  MADHURI with scant serous drainage. PEG draining normal gastric contents. Abdomen soft, non-distended. Rectal tube with watery brown output.     Genitourinary:   Genitourinary Comments: Nephrostomy tube in place with watery dark yellow output.  Fontenot catheter+   Musculoskeletal: Normal range of motion.   Neurological:   Able to follow commands, denying pain.    Skin: Skin is warm and dry.   Vitals reviewed.      Lines/Drains/Airways     Drain                 Closed/Suction Drain  04/21/19 0300 LLQ 13 days         Nephrostomy 04/21/19 0629 Left 8 Fr. 13 days         Urethral Catheter 04/20/19 1711 Latex 16 Fr. 13 days         Gastrostomy/Enterostomy 05/02/19 1134 Percutaneous endoscopic gastrostomy (PEG) LUQ decompression;feeding 1 day          Airway                 Surgical Airway 05/02/19 1107 Shiley Cuffed 2 days          Peripheral Intravenous Line                 Peripheral IV - Single Lumen 04/30/19 1505 20 G Right Forearm 3 days         Peripheral IV - Single Lumen 05/03/19 0244 22 G Left Forearm 1 day         Midline Catheter Insertion/Assessment  - Single Lumen 05/03/19 1706 Left basilic vein (medial side of arm) 18g x 10cm less than 1 day                Significant Labs:    ABG:  None in the last 48 hrs    CBC/Anemia Profile:  Recent Labs   Lab 05/03/19  1711 05/03/19  2211 05/04/19  0438   WBC 12.02 12.22 11.78  11.78   HGB 8.6* 8.1* 8.3*  8.3*   HCT 27.1* 25.4* 26.9*  26.9*   * 492* 529*  529*   MCV 86 85 87  87   RDW 13.4 13.2 13.2  13.2        Chemistries:  Recent Labs   Lab 05/03/19  0243 05/04/19  0438    141   K 3.7 3.1*   CL 98 99   CO2 29 31*   BUN 23* 17   CREATININE 0.8 0.7   CALCIUM 9.1 9.2   ALBUMIN 1.7* 1.7*   PROT 6.8 6.6   BILITOT 1.6* 1.8*   ALKPHOS 160* 143*   ALT 33 26   AST 30 23   MG 1.7 1.9   PHOS 2.9 2.1*       Significant Imaging:  I have reviewed all pertinent imaging results/findings within the past 24 hours.      Scheduled Meds:   cefTRIAXone (ROCEPHIN) IVPB  2 g Intravenous Q24H    chlorhexidine  15 mL Mouth/Throat BID    lorazepam  1 mg Intravenous Once    pantoprazole  40 mg Intravenous Daily    rifAMpin (RIFADIN) IVPB  300 mg Intravenous Q12H    vancomycin (VANCOCIN) IVPB  1,000 mg Intravenous Q12H     Continuous Infusions:   heparin (porcine) in D5W 21.053 Units/kg/hr (05/04/19 1100)    insulin (HUMAN R) infusion (adults) 1.3 Units/hr (05/04/19 1100)     PRN Meds:acetaminophen, albuterol-ipratropium, calcium gluconate IVPB,  calcium gluconate IVPB, calcium gluconate IVPB, dextrose 50%, dextrose 50%, [] fentaNYL **FOLLOWED BY** fentaNYL, glucagon (human recombinant), glucose, glucose, glycopyrrolate, hydrALAZINE, insulin aspart U-100, magnesium sulfate IVPB, magnesium sulfate IVPB, nitroGLYCERIN, ondansetron, potassium chloride in water **AND** potassium chloride in water **AND** potassium chloride in water, promethazine (PHENERGAN) IVPB, ramelteon, sodium chloride 0.9%    Vital signs in last 24 hours:  Temp:  [99.5 °F (37.5 °C)-99.8 °F (37.7 °C)] 99.6 °F (37.6 °C)  Pulse:  [] 111  Resp:  [18-25] 20  SpO2:  [94 %-100 %] 99 %  BP: (117-180)/() 177/83    Intake/Output last 3 shifts:  I/O last 3 completed shifts:  In: 2254.8 [I.V.:1174.8; NG/GT:1080]  Out: 3311 [Urine:2260; Drains:1051]  Intake/Output this shift:  I/O this shift:  In: 50 [NG/GT:50]  Out: 871 [Urine:870; Drains:1]    Problem Assessment/Plan  Cardiac/Vascular  CAD (coronary artery disease)  Assessment & Plan  ASSESSMENT/PLAN:   Mariann Huff is a 59 y.o. female s/p left ureteral injury on , who presented with fever, AMS, and intraabdominal abscess, taken for washout and ligation of left ureter with nephrostomy tube placement on . S/p Trach/PEG on .     Neuro:   - Pain control with fentanyl prn   - Romeltion prn     Pulmonary:   - Been tolerating trach collar since 5/3  - CXR prn  - ABGs prn      Cardiac:   - HDS at this time.  Pt has been weaned off pressors.   - TTE ordered yesterday, EF 55%     Renal:    - S/p transection of left ureter  and subsequent ligation and PCT nephrostomy tube placement with IR   - Renal function improving.    - UOP 1.5 L total, net - 177 cc  - Monitor I/Os  - Hold Lasix      ID:   - Blood cultures  +E. Coli; sensitivities pending. Repeat Bld Cx show NGTD.   - UCx from  growing E. Coli; sensitivities are now back. Repeat Cx pending.   - ID following for assistance with abx therapy, currently on  Ceftriaxone, Rifampin and vancomycin.  - AF overnight  - WBC trending down     Hem/Onc:   - H/H stable at this time; cont to monitor  - Hep gtt started for RUE DVT     Endocrine:  - DM Type 2 at baseline    - Insulin gtt for better BG control, will transition to SSI once TF are at goal  - Endocrine following for assistance with blood glucose control.      Fluids/Electrolytes/Nutrition/GI:   # Shock Liver          - Resolved           - Labs continue to show improvement          - Elevated LFTs now normalized          - Thrombocytopenia; plts count improving; plts normalized          - INR normalized to 0.9     # Lactic Acidosis          - Now resolved    # Diarrhea          - Pt with multiple episodes of loose watery stool resembling urine noted from rectal tube; 100 ml watery stool overnight          - C.Diff panel Negative          - MADHURI drain also with yellow fluid resembling urine.  Sample sent for Cr analysis, 1.7.      - Advance tube feeds as tolerated to a goal of 40cc/hr  - Start free water flushes 300 cc q6h  - Replace electrolytes PRN        PPx:  - DVT: Lovenox, hep gtt for RUE DVT        DISPO:  - Continue SICU care, could likely step down tomorrow  - PT/OT/SLP consults placed              Critical care was time spent personally by me on the following activities: development of treatment plan with patient or surrogate and bedside caregivers, discussions with consultants, evaluation of patient's response to treatment, examination of patient, ordering and performing treatments and interventions, ordering and review of laboratory studies, ordering and review of radiographic studies, pulse oximetry, re-evaluation of patient's condition.  This critical care time did not overlap with that of any other provider or involve time for any procedures.     Zoë Du MD CA-1  Critical Care - Surgery

## 2019-05-04 NOTE — ASSESSMENT & PLAN NOTE
Elizaamanda Huff is a 59 y.o. female s/p left ureteral injury on 4/12, presented with fever, AMS, and intraabdominal abscess, taken for washout and ligation of left ureter with neph tube placement on 4/21, Trach/PEG 5/2.    - tube feeds per SICU  - Trach per SICU  - Rocephin, Rifampin, Vancomycin  - Maintain correa, neph tube, and MADHURI drain  - No need for intervention of abdominal wall fluid collection at this time, continue to monitor clinically, if worsens then may need to drain, but likely to be seroma  - continue ICU care

## 2019-05-04 NOTE — PLAN OF CARE
Problem: SLP Goal  Goal: SLP Goal  Speech Language Pathology Goals  Goals expected to be met by 5/11  1.  Pt/family education regarding PMSV use/benefits/precautions.  2.  Pt will tolerate PMSV for 5 minutes with no s/s distress.        Speech Therapy Passy Katina Speaking Valve evaluation completed s/p trach.  Full session note to follow.     MAURICE Cortez, CCC-SLP  Speech Language Pathologist  [phone]  5/4/2019

## 2019-05-04 NOTE — SUBJECTIVE & OBJECTIVE
Interval History:   On trach collar overnight and this AM. Otherwise no acute events. Drain output minimal.    Review of Systems  Objective:     Temp:  [99.3 °F (37.4 °C)-99.8 °F (37.7 °C)] 99.5 °F (37.5 °C)  Pulse:  [] 104  Resp:  [18-25] 18  SpO2:  [94 %-100 %] 96 %  BP: (117-180)/() 147/65     Body mass index is 30.08 kg/m².    Date 05/04/19 0700 - 05/05/19 0659   Shift 5655-4697 5214-9484 8113-3812 24 Hour Total   INTAKE   NG/GT 20   20   Shift Total(mL/kg) 20(0.3)   20(0.3)   OUTPUT   Shift Total(mL/kg)       Weight (kg) 79.5 79.5 79.5 79.5          Drains     Drain                 Closed/Suction Drain 04/21/19 0300 LLQ 13 days         Nephrostomy 04/21/19 0629 Left 8 Fr. 13 days         Urethral Catheter 04/20/19 1711 Latex 16 Fr. 13 days         Gastrostomy/Enterostomy 05/02/19 1134 Percutaneous endoscopic gastrostomy (PEG) LUQ decompression;feeding 1 day                Physical Exam   Constitutional: She appears well-developed and well-nourished. No distress.   HENT:   Head: Normocephalic and atraumatic.   Neck: No JVD present.   Cardiovascular: Normal rate and regular rhythm.    Pulmonary/Chest:   On trach collar   Abdominal: Soft. She exhibits no distension. There is no rebound and no guarding.   Incision c/d/i   MADHURI drain with ss output  G-tube   Genitourinary:   Genitourinary Comments: Fontenot draining clear yellow  Left neph tube with clear yellow urine   Neurological: She is alert.   Skin: Skin is warm and dry. She is not diaphoretic. No pallor.         Significant Labs:    BMP:  Recent Labs   Lab 05/02/19  0320 05/03/19  0243 05/04/19  0438   * 139 141   K 4.0 3.7 3.1*   CL 98 98 99   CO2 31* 29 31*   BUN 26* 23* 17   CREATININE 0.7 0.8 0.7   CALCIUM 9.3 9.1 9.2       CBC:   Recent Labs   Lab 05/03/19  1711 05/03/19  2211 05/04/19  0438   WBC 12.02 12.22 11.78  11.78   HGB 8.6* 8.1* 8.3*  8.3*   HCT 27.1* 25.4* 26.9*  26.9*   * 492* 529*  529*       All pertinent labs results  from the past 24 hours have been reviewed.    Significant Imaging:  All pertinent imaging results/findings from the past 24 hours have been reviewed.

## 2019-05-04 NOTE — PLAN OF CARE
Called MD Espino regarding PTT result of 26.7- as per orders- do not give bolus of 60 units/kg/hr- just increase dose to 21 units/kg/hr and he will discuss rate/dose change with Dr. Ruano this AM.

## 2019-05-04 NOTE — SUBJECTIVE & OBJECTIVE
Interval History/Significant Events: Rectal bleed overnight, removed rectal tube. No further bloody BMs. RUE swelling, found to have DVT within the RIJ and R subclavian veins, hep gtt started overnight. Otherwise doing well. Tolerating trach collar since 5/3    Follow-up For: Procedure(s) (LRB):  Creation, Nephrostomy, Percutaneous (Left)    Post-Operative Day: 13 Days Post-Op     Objective:     Vital Signs (Most Recent):  Temp: 99.6 °F (37.6 °C) (05/04/19 1100)  Pulse: (!) 111 (05/04/19 1100)  Resp: 20 (05/04/19 1000)  BP: (!) 177/83 (05/04/19 1100)  SpO2: 99 % (05/04/19 1100) Vital Signs (24h Range):  Temp:  [99.5 °F (37.5 °C)-99.8 °F (37.7 °C)] 99.6 °F (37.6 °C)  Pulse:  [] 111  Resp:  [18-25] 20  SpO2:  [94 %-100 %] 99 %  BP: (117-180)/() 177/83     Weight: 79.5 kg (175 lb 4.3 oz)  Body mass index is 30.08 kg/m².      Intake/Output Summary (Last 24 hours) at 5/4/2019 1120  Last data filed at 5/4/2019 1100  Gross per 24 hour   Intake 1783.82 ml   Output 1952 ml   Net -168.18 ml       Physical Exam   Constitutional: She appears well-developed and well-nourished.   HENT:   Head: Normocephalic and atraumatic.   Tracheostomy in place   Eyes: Right eye exhibits no discharge. Left eye exhibits no discharge.   Neck: Normal range of motion. Neck supple.   Cardiovascular: Normal rate and regular rhythm.   Pulmonary/Chest: Effort normal. No respiratory distress.   Trach collar.    Abdominal:   Midline incision c/d/i.  MADHURI with scant serous drainage. PEG draining normal gastric contents. Abdomen soft, non-distended. Rectal tube with watery brown output.     Genitourinary:   Genitourinary Comments: Nephrostomy tube in place with watery dark yellow output.  Fontenot catheter+   Musculoskeletal: Normal range of motion.   Neurological:   Able to follow commands, denying pain.    Skin: Skin is warm and dry.   Vitals reviewed.      Lines/Drains/Airways     Drain                 Closed/Suction Drain 04/21/19 0300 LLQ 13  days         Nephrostomy 04/21/19 0629 Left 8 Fr. 13 days         Urethral Catheter 04/20/19 1711 Latex 16 Fr. 13 days         Gastrostomy/Enterostomy 05/02/19 1134 Percutaneous endoscopic gastrostomy (PEG) LUQ decompression;feeding 1 day          Airway                 Surgical Airway 05/02/19 1107 Shiley Cuffed 2 days          Peripheral Intravenous Line                 Peripheral IV - Single Lumen 04/30/19 1505 20 G Right Forearm 3 days         Peripheral IV - Single Lumen 05/03/19 0244 22 G Left Forearm 1 day         Midline Catheter Insertion/Assessment  - Single Lumen 05/03/19 1706 Left basilic vein (medial side of arm) 18g x 10cm less than 1 day                Significant Labs:    ABG:  None in the last 48 hrs    CBC/Anemia Profile:  Recent Labs   Lab 05/03/19  1711 05/03/19  2211 05/04/19  0438   WBC 12.02 12.22 11.78  11.78   HGB 8.6* 8.1* 8.3*  8.3*   HCT 27.1* 25.4* 26.9*  26.9*   * 492* 529*  529*   MCV 86 85 87  87   RDW 13.4 13.2 13.2  13.2        Chemistries:  Recent Labs   Lab 05/03/19  0243 05/04/19  0438    141   K 3.7 3.1*   CL 98 99   CO2 29 31*   BUN 23* 17   CREATININE 0.8 0.7   CALCIUM 9.1 9.2   ALBUMIN 1.7* 1.7*   PROT 6.8 6.6   BILITOT 1.6* 1.8*   ALKPHOS 160* 143*   ALT 33 26   AST 30 23   MG 1.7 1.9   PHOS 2.9 2.1*       Significant Imaging:  I have reviewed all pertinent imaging results/findings within the past 24 hours.

## 2019-05-04 NOTE — ASSESSMENT & PLAN NOTE
BG goal 140 - 180  BG is within or below goal on tansition IV insulin Infusion.     Increase transition IV insulin infusion with stepdown parameters. Initial rate starting at 1.5 (BG is reasonably controlled. However has started TF with increasing rate).  Discontinue Novolog 3-6 units once patient start consuming regular meals.   Low Dose SQ Insulin Correction Scale.  BG monitoring every 4 hours while on TF.        ** Please notify Endocrine for any change and/or advance in diet/TF**  ** Please call Endocrine for any BG related issues **    Discharge Recommendations:     TBD.

## 2019-05-04 NOTE — PLAN OF CARE
Patient alert and follows commands with bilateral upper and lower extremities. HR 100s-110s. SBP<170. Hydralazine given once this shift to maintain SBP goal. Tolerated trach collar on 10L and 50%FIO2 all shift - now weaned to 8L and 35% with SPO2>93%. Pain controlled with fentanyl per MAR orders. Clots in LATRICIA noted from ultrasound- started heparin gtt per MD orders. ACHS+bedtime+200AM- no change in rate/dose of insulin gtt. TF at trickle feeds of 10 cc/hr. PEG tube intact. 300 cc water bolus q6h. Midsternal incision clean dry and intact- see flowsheet for details. Nephrostomy bag with 275cc serous output. MADHURI drain with 5 cc serous output. Fontenot with 20-30cc concentrated urine output. 2 bowel movements this shift- no bloody bowel movement noted. Consult for wound care placed. Patient repositioned q2h per wedge and pillows. Bath given. Patient and spouse updated on plan of care. Safety precautions maintained. Will continue to monitor. See flowsheet for detailed assessment.

## 2019-05-04 NOTE — PT/OT/SLP EVAL
Speech Language Pathology Evaluation  One Way Speaking Valve Evaluation    Patient Name:  Mariann Huff   MRN:  [id number]  Admitting Diagnosis: Ureteral transection of left native kidney    IMPORTANT  CAUTION: CUFF MUST ALWAYS BE DEFLATED WHEN VALVE IN USE  PT SHOULD ONLY ATTEMPT VALVE UNDER TRAINED SUPERVISION AT THIS TIME.    Recommendations:                 General Recommendations:  One Way Speaking Valve  General Precautions: Standard, respiratory  Communication strategies:  communication board ; mouthing; gesture    History:     Past Medical History:   Diagnosis Date    Anticoagulant long-term use     Asthma     Back pain     CAD (coronary artery disease)     s/p stentimg  (2), (1)    Carotid artery stenosis     Diabetes mellitus type 2 in obese     HTN (hypertension), benign     Hyperlipidemia     Keloid cicatrix     NPDR (nonproliferative diabetic retinopathy) 2015    NSTEMI (non-ST elevated myocardial infarction)     Nuclear sclerosis - Right Eye 3/18/2014    Primary localized osteoarthrosis, lower leg 2014    Sleep apnea        Past Surgical History:   Procedure Laterality Date    BRONCHOSCOPY N/A 2019    Performed by [facility] at [facility] OR 2ND FLR    CARDIAC CATHETERIZATION      cataract extraction left eye      cataracts      CATHETERIZATION, HEART, LEFT Left 2014    Performed by Wilman Kim MD at [facility] CATH LAB     SECTION, LOW TRANSVERSE      CORONARY ANGIOPLASTY      Creation, Nephrostomy, Percutaneous Left 2019    Performed by Karina Surgeon at [facility] KARINA    CREATION, TRACHEOSTOMY N/A 2019    Performed by Sean [facility]ano MD at [facility] OR 2ND FLR    EGD, WITH PEG TUBE INSERTION  2019    Performed by [facility] at [facility] OR 2ND FLR    ESOPHAGOGASTRODUODENOSCOPY (EGD) N/A 2016    Performed by Gardenia Adamson MD at [facility] ENDO (4TH FLR)    EXCISION TURBINATE, SUBMUCOUS      FUSION, SPINE, LUMBAR,  ANTERIOR APPROACH Left L5-S1 Anterior to Psoa Interbody Fusion, L5-S1 Posterior Instrumentation Left 4/12/2019    [facility]    HAND SURGERY Left     HAND SURGERY Right     torn ligament repair/ Dr. Yeboah    HYSTERECTOMY      Injection,steroid,epidural,transforaminal approach - Bilateral - S1 Bilateral 9/25/2018    Performed by Tl Abreu MD at [facility] PAIN MGT    Injection-steroid-epidural-caudal N/A 2/7/2019    Performed by Dave Bentley Jr., MD at [facility] PAIN MGT    INSERTION-INTRAOCULAR LENS (IOL) Right 9/1/2015    [facility]    LAPAROTOMY, EXPLORATORY; LIGATION OF LEFT URETER; DOUBLE J STENT REMOVAL LEFT URETER Left 4/20/2019    [facility]    left foot surgery      left wrist surgery      NASAL SEPTUM SURGERY  5/7/15    PHACOEMULSIFICATION-ASPIRATION-CATARACT Right 9/1/2015    [facility]    REPAIR, URETER  4/12/2019    Performed by Mk George MD at [facility] OR [address] [place] [address]    RESECTION-TURBINATES (SMR) N/A 5/7/2015    Performed by Dileep Dubois III, MD at [facility] OR [address] [place] [address]    rt elbow surgery      S/P LAD COATED STENT  05/14/2010    6 total     S/P OM1 STENT  08/2003    SEPTOPLASTY N/A 5/7/2015    Performed by Dileep Dubois III, MD at [facility] OR [address] [place] [address]    SINUS SURGERY      F.E.S.S.    SINUS SURGERY FUNCTIONAL ENDOSCOPIC WITH NAVIGATION WITH MAXILLARIES, ETHMOIDS, LEFT SPHENOID, LEFT LOLY N/A 5/7/2015    Performed by Dileep Dubois III, MD at [facility] OR [address] [place] [address]    STENT, URETERAL placement  4/12/2019    Performed by Robin Boyd MD at [facility] OR [address] [place] [address]    TUBAL LIGATION       Subjective     Pt seen this afternoon with  present.  Pt appearing fatigued.  reports pt did not sleep well.   Passy [place] Valve obtained from Respiratory Therapy Department prior to session.    Objective:     Level of  Alertness:  · Lethargic but cooperative    Secretion Management:  · pt undergoing respiratory tx care upon ST entry; inner cannula changed    Pain/Comfort:  · Pain Rating 1: 0/10  · Pain Rating 2: 0/10    Oral Musculature Evaluation  ·  WFL    Trach/Vent:  · Tracheostomy Type  · Cuffed  · Tracheostomy Size: 8.0  · Tolerates cuff deflated: yes    Pre Treatment Measures  Trach Collar    · O2 - 94 % or above    One Way Speaking Valve Placement:  Type of speaking valve: One Way Speaking Valve  Clear    Sp02 before trial: 94%  Sp02 during trial: No significant change in SPO2 sats.  Pt perceived inability to breath and became anxious, quickly gesturing for valve removal.  Time on valve: > 10 seconds x2 trials  Phonation on exhalation: none elicited  Achieved phonation on 0% of words  Overall speech intelligibility: poor with limited attempt    Patient reaction: Anxious    Laryngeal function:  Voice Quality:  · Aphonic- no audible voicing achieved.    Education provided: Education provided to pt/ regarding ST role, changes post trach, Passy [place] Speaking Valve use/benefits/precautions, cleaning of valve and plan of care.       Assessment:     Mariann Huff is a 59 y.o. female with an SLP diagnosis of Aphonia and One Way Speaking Valve Training.  She presents with poor valve tolerance this date. ST will continue to follow.  In terms of PMSV tolerance, pt would benefit from considering trach downsize when medically appropriate.     Goals:   Multidisciplinary Problems     SLP Goals        Problem: SLP Goal    Goal Priority Disciplines Outcome   SLP Goal     SLP    Description:  Speech Language Pathology Goals  Goals expected to be met by 5/11  1.  Pt/family education regarding PMSV use/benefits/precautions.  2.  Pt will tolerate PMSV for 5 minutes with no s/s distress.                        Plan:     · Patient to be seen:  4 x/week   · Plan of Care expires:   6/2/19  · Plan of Care reviewed with:  patient, spouse    · SLP Follow-Up:  Yes       Discharge recommendations:  (TBD)     Time Tracking:     SLP Treatment Date:   05/04/19  Speech Start Time:  1250  Speech Stop Time:  1304     Speech Total Time (min):  14 min    Billable Minutes: Evaluation Use/Fit Voiced Prosthetic 14    MAURICE Cortez, CCC-SLP  Speech Language Pathologist  [phone]  5/4/2019

## 2019-05-04 NOTE — PROGRESS NOTES
[facility]  Urology  Progress Note    Patient Name: Mariann Huff  MRN: [id number]  Admission Date: 4/20/2019  Hospital Length of Stay: 14 days  Code Status: Full Code   Attending Provider: Robin Boyd MD   Primary Care Physician: Jasbir Haney MD    Subjective:     HPI:  Mariann Huff is a 59 y.o. female with history of HTN, type 2 diabetes mellitus, CAD, NSTEMI, and back pain who presents to [facility] with altered mental status and sepsis. She underwent L5-S1 OLIF with NSGY on 4/12 and had intraoperative left ureteral injury with ureteroureteral anastomosis and ureteral stent placement. She did well initially and had correa and MADHURI drain removed on 4/16. She began having chills and altered mental status 2 days ago and this has progressively worsened. No complaints of pain.     She is altered and HPI is limited. In the ED she is febrile to 103 and tachycardic and pressures are low normal. WBC is 5, creatinine is 3.6 baseline 1.0, lactate is 4.6. Cath UA concerning for infection, 3+ LE, >100 WBCs, and many bacteria on microscopy.    CT and MRI abdomen both show air with minimal fluid near the surgical site with left ureter coursing through. There is air throughout the proximal collecting system which is decompressed with JJ ureteral stent in good position.    Taken for ex lap, ligation of left ureter and left neph tube placement on 4/21/19.    Interval History:   On trach collar overnight and this AM. Otherwise no acute events. Drain output minimal.    Review of Systems  Objective:     Temp:  [99.3 °F (37.4 °C)-99.8 °F (37.7 °C)] 99.5 °F (37.5 °C)  Pulse:  [] 104  Resp:  [18-25] 18  SpO2:  [94 %-100 %] 96 %  BP: (117-180)/() 147/65     Body mass index is 30.08 kg/m².    Date 05/04/19 0700 - 05/05/19 0659   Shift 6388-5269 2394-5352 6220-3421 24 Hour Total   INTAKE   NG/GT 20   20   Shift Total(mL/kg) 20(0.3)   20(0.3)   OUTPUT   Shift Total(mL/kg)       Weight (kg) 79.5 79.5 79.5 79.5           Drains     Drain                 Closed/Suction Drain 04/21/19 0300 LLQ 13 days         Nephrostomy 04/21/19 0629 Left 8 Fr. 13 days         Urethral Catheter 04/20/19 1711 Latex 16 Fr. 13 days         Gastrostomy/Enterostomy 05/02/19 1134 Percutaneous endoscopic gastrostomy (PEG) LUQ decompression;feeding 1 day                Physical Exam   Constitutional: She appears well-developed and well-nourished. No distress.   HENT:   Head: Normocephalic and atraumatic.   Neck: No JVD present.   Cardiovascular: Normal rate and regular rhythm.    Pulmonary/Chest:   On trach collar   Abdominal: Soft. She exhibits no distension. There is no rebound and no guarding.   Incision c/d/i   MADHURI drain with ss output  G-tube   Genitourinary:   Genitourinary Comments: Correa draining clear yellow  Left neph tube with clear yellow urine   Neurological: She is alert.   Skin: Skin is warm and dry. She is not diaphoretic. No pallor.         Significant Labs:    BMP:  Recent Labs   Lab 05/02/19  0320 05/03/19  0243 05/04/19  0438   * 139 141   K 4.0 3.7 3.1*   CL 98 98 99   CO2 31* 29 31*   BUN 26* 23* 17   CREATININE 0.7 0.8 0.7   CALCIUM 9.3 9.1 9.2       CBC:   Recent Labs   Lab 05/03/19  1711 05/03/19  2211 05/04/19  0438   WBC 12.02 12.22 11.78  11.78   HGB 8.6* 8.1* 8.3*  8.3*   HCT 27.1* 25.4* 26.9*  26.9*   * 492* 529*  529*       All pertinent labs results from the past 24 hours have been reviewed.    Significant Imaging:  All pertinent imaging results/findings from the past 24 hours have been reviewed.                  Assessment/Plan:     * Ureteral transection of left native kidney  Mariann Huff is a 59 y.o. female s/p left ureteral injury on 4/12, presented with fever, AMS, and intraabdominal abscess, taken for washout and ligation of left ureter with neph tube placement on 4/21, Trach/PEG 5/2.    - tube feeds per SICU  - Trach per SICU  - Rocephin, Rifampin, Vancomycin  - Maintain correa, neph tube, and MADHURI  drain  - No need for intervention of abdominal wall fluid collection at this time, continue to monitor clinically, if worsens then may need to drain, but likely to be seroma  - continue ICU care    Sepsis  As above        VTE Risk Mitigation (From admission, onward)        Ordered     heparin 25,000 units in dextrose 5% 250 mL (100 units/mL) infusion HIGH INTENSITY nomogram - [facility]  Continuous      05/03/19 2155     Place sequential compression device  Until discontinued      04/20/19 2108     IP VTE HIGH RISK PATIENT  Once      04/20/19 2108          Hugh Higuera MD  Urology  [facility]

## 2019-05-04 NOTE — ASSESSMENT & PLAN NOTE
ASSESSMENT/PLAN:   Mariann Huff is a 59 y.o. female s/p left ureteral injury on 4/12, who presented with fever, AMS, and intraabdominal abscess, taken for washout and ligation of left ureter with nephrostomy tube placement on 4/21. S/p Trach/PEG on 5/2.     Neuro:   - Pain control with fentanyl prn   - Romeltion prn     Pulmonary:   - Been tolerating trach collar since 5/3  - CXR prn  - ABGs prn      Cardiac:   - HDS at this time.  Pt has been weaned off pressors.   - TTE ordered yesterday, EF 55%     Renal:    - S/p transection of left ureter 4/12 and subsequent ligation and PCT nephrostomy tube placement with IR 4/21  - Renal function improving.    - UOP 1.5 L total, net - 177 cc  - Monitor I/Os  - Hold Lasix      ID:   - Blood cultures 4/12 +E. Coli; sensitivities pending. Repeat Bld Cx show NGTD.   - UCx from 4/20 growing E. Coli; sensitivities are now back. Repeat Cx pending.   - ID following for assistance with abx therapy, currently on Ceftriaxone, Rifampin and vancomycin.  - AF overnight  - WBC trending down     Hem/Onc:   - H/H stable at this time; cont to monitor  - Hep gtt started for RUE DVT     Endocrine:  - DM Type 2 at baseline    - Insulin gtt for better BG control, will transition to SSI once TF are at goal  - Endocrine following for assistance with blood glucose control.      Fluids/Electrolytes/Nutrition/GI:   # Shock Liver          - Resolved           - Labs continue to show improvement          - Elevated LFTs now normalized          - Thrombocytopenia; plts count improving; plts normalized          - INR normalized to 0.9     # Lactic Acidosis          - Now resolved    # Diarrhea          - Pt with multiple episodes of loose watery stool resembling urine noted from rectal tube; 100 ml watery stool overnight          - C.Diff panel Negative          - MADHURI drain also with yellow fluid resembling urine.  Sample sent for Cr analysis, 1.7.      - Advance tube feeds as tolerated to a goal of  40cc/hr  - Start free water flushes 300 cc q6h  - Replace electrolytes PRN        PPx:  - DVT: Lovenox, hep gtt for RUE DVT        DISPO:  - Continue SICU care, could likely step down tomorrow  - PT/OT/SLP consults placed              Critical care was time spent personally by me on the following activities: development of treatment plan with patient or surrogate and bedside caregivers, discussions with consultants, evaluation of patient's response to treatment, examination of patient, ordering and performing treatments and interventions, ordering and review of laboratory studies, ordering and review of radiographic studies, pulse oximetry, re-evaluation of patient's condition.  This critical care time did not overlap with that of any other provider or involve time for any procedures.     Zoë Du MD CA-1  Critical Care - Surgery

## 2019-05-04 NOTE — PROGRESS NOTES
Pharmacokinetic Assessment Follow Up: IV Vancomycin      Vancomycin Regimen Plan:    - Patient received a loading dose of 2000 mg once followed by a maintenance dose of vancomycin 1250mg IV every 12 hours.  - Goal serum trough concentration is 15 to 20 mg/dl (clarified with ID PharmD)  - Vancomycin level on 5/2 at 08:00 was 23.3 mg/dl.  This level was drawn approximately 10.5 hours after the previous dose.    - Dose was decreased to 1000 mg every 12 hours  - Next trough was due 5/4 at 08:00; however, evening RN held vancomycin dose that was due for 2030 on 5/3   - Please continue vancomycin 1000 mg every 12 hr   - Moving vancomycin trough to 5/5 0800 to obtain accurate concentration      Pharmacy will continue to follow and monitor vancomycin.    Please contact pharmacy at extension [phone] for questions regarding this assessment.    Thank you for the consult,   Elena Hurtado, PharmD, [facility]     Patient brief summary:  Mariann Huff is a 59 y.o. female initiated on antimicrobial therapy with IV Vancomycin for treatment of suspected bone/joint    Drug Allergies:   Review of patient's allergies indicates:  No Known Allergies    Actual Body Weight:   79.5kg    Renal Function:   Estimated Creatinine Clearance: 89.3 mL/min (based on SCr of 0.7 mg/dL).,     Dialysis Method (if applicable):  Not applicable     CBC (last 72 hours):  Recent Labs   Lab Result Units 05/02/19  0320 05/03/19  0243 05/03/19  1711 05/03/19  2211 05/04/19  0438   WBC K/uL 12.55 11.57 12.02 12.22 11.78  11.78   Hemoglobin g/dL 9.1* 8.3* 8.6* 8.1* 8.3*  8.3*   Hematocrit % 29.4* 26.9* 27.1* 25.4* 26.9*  26.9*   Platelets K/uL 358* SEE COMMENT 498* 492* 529*  529*   Gran% % 75.9* 75.7* 75.0* 73.9* 73.3*  73.3*   Lymph% % 12.1* 11.3* 10.4* 10.3* 10.7*  10.7*   Mono% % 8.9 10.8 10.5 11.8 11.7  11.7   Eosinophil% % 2.2 1.3 2.7 2.9 3.0  3.0   Basophil% % 0.4 0.3 0.7 0.5 0.8  0.8   Differential Method  Automated Automated Automated Automated  Automated  Automated       Metabolic Panel (last 72 hours):  Recent Labs   Lab Result Units 05/02/19  0320 05/03/19  0243 05/04/19  0438   Sodium mmol/L 135* 139 141   Potassium mmol/L 4.0 3.7 3.1*   Chloride mmol/L 98 98 99   CO2 mmol/L 31* 29 31*   Glucose mg/dL 142* 155* 125*   BUN, Bld mg/dL 26* 23* 17   Creatinine mg/dL 0.7 0.8 0.7   Albumin g/dL 1.7* 1.7* 1.7*   Total Bilirubin mg/dL 1.4* 1.6* 1.8*   Alkaline Phosphatase U/L 179* 160* 143*   AST U/L 34 30 23   ALT U/L 42 33 26   Magnesium mg/dL 1.9 1.7 1.9   Phosphorus mg/dL 2.6* 2.9 2.1*       Vancomycin Administrations:  vancomycin given in the last 96 hours                   vancomycin in dextrose 5 % 1 gram/250 mL IVPB 1,000 mg (mg) 1,000 mg New Bag 05/03/19 0834     1,000 mg New Bag 05/02/19 2129    vancomycin (VANCOCIN) 1250 mg in 5 % dextrose 250 mL IVPB (mg) 1,250 mg New Bag 05/02/19 0809     1,250 mg New Bag 05/01/19 2129     1,250 mg New Bag  0930     1,250 mg New Bag 04/30/19 2029                Drug levels (last 3 results):  Recent Labs   Lab Result Units 05/02/19  0800   Vancomycin-Trough ug/mL 23.3*       Microbiologic Results:  Microbiology Results (last 7 days)     Procedure Component Value Units Date/Time    Blood culture [[id number]] Collected:  04/30/19 0230    Order Status:  Completed Specimen:  Blood from Peripheral, Hand, Right Updated:  05/04/19 0612     Blood Culture, Routine No Growth to date     Blood Culture, Routine No Growth to date     Blood Culture, Routine No Growth to date     Blood Culture, Routine No Growth to date     Blood Culture, Routine No Growth to date    Blood culture [[id number]] Collected:  04/30/19 0247    Order Status:  Completed Specimen:  Blood from Peripheral, Wrist, Right Updated:  05/04/19 0612     Blood Culture, Routine No Growth to date     Blood Culture, Routine No Growth to date     Blood Culture, Routine No Growth to date     Blood Culture, Routine No Growth to date     Blood Culture, Routine No Growth to  date    Culture, VAP (BAL) [[id number]] Collected:  04/30/19 0900    Order Status:  Completed Specimen:  Bronchial Alveolar Lavage from BAL, RUL Updated:  05/02/19 1342     VAP BAL CULTURE No growth     Gram Stain (Respiratory) <10 epithelial cells per low power field.     Gram Stain (Respiratory) No WBC's     Gram Stain (Respiratory) No organisms seen    Clostridium difficile EIA [[id number]] Collected:  05/01/19 1230    Order Status:  Completed Specimen:  Stool Updated:  05/01/19 2110     C. diff Antigen Negative     C difficile Toxins A+B, EIA Negative     Comment: Testing not recommended for children <24 months old.       Blood culture [[id number]] Collected:  04/24/19 0950    Order Status:  Completed Specimen:  Blood from Peripheral, Wrist, Right Updated:  04/29/19 1412     Blood Culture, Routine No growth after 5 days.    Aerobic culture [[id number]]  (Susceptibility) Collected:  04/21/19 0125    Order Status:  Completed Specimen:  Body Fluid from Abdomen Updated:  04/29/19 0951     Aerobic Bacterial Culture --     STAPHYLOCOCCUS LUGDUNENSIS  Rare      Narrative:       Retroperitoneal fluid    Blood culture [[id number]] Collected:  04/23/19 0430    Order Status:  Completed Specimen:  Blood from Peripheral, Ankle, Right Updated:  04/28/19 0822     Blood Culture, Routine No growth after 5 days.    Narrative:       Please collect with AM labs    Blood culture [[id number]] Collected:  04/23/19 0500    Order Status:  Completed Specimen:  Blood from Peripheral, Antecubital, Right Updated:  04/28/19 0822     Blood Culture, Routine No growth after 5 days.    Narrative:       Please collect with AM labs

## 2019-05-04 NOTE — PROGRESS NOTES
"[facility]  Endocrinology  Progress Note    Admit Date: 4/20/2019     Reason for Consult: Management of T2DM, Hyperglycemia     Surgical Procedure and Date:       04/21/2019:         1. Exploratory laparotomy        2. Ligation of left ureter        3. Removal of left JJ ureteral stent      Diabetes diagnosis year: ANDRE    Home Diabetes Medications:  ANDRE  Toujeo 50 BID  Invokana 300mg daily   Novolog 35 units AM, 45 units PM, 35 units PM    How often checking glucose at home? ANDRE   BG readings on regimen: ANDRE  Hypoglycemia on the regimen?  ANDRE  Missed doses on regimen?  ANDRE    Diabetes Complications include:     Hyperglycemia and Diabetic retinopathy     Complicating diabetes co morbidities:   History of MI and MURTAZA, CAD, HLD    HPI:   Patient is a 59 y.o. female with a diagnosis of HTN, HLN, DM type 2, nonproliferative diabetic renopathy, CAD, NSTEMI, and back pain who presents to the ED with a complaint of altered mental status on 04/20/2019. Is now s/p Exploratory laparotomy, Ligation of left ureter, and Removal of left JJ ureteral stent. Endocrinology consulted to manage DM2/Hyperglycemia.    Lab Results   Component Value Date    HGBA1C 10.8 (H) 02/19/2019       Interval HPI:   Overnight events:   BG within goal on IV and SQ insulin infusion. However, Patient has since started TF.     Eating:   <25%  Nausea: No  Hypoglycemia and intervention: No  Fever: No  TPN and/or TF: Yes  If yes, type of TF/TPN and rate: 20 ml/hr. (goal of 40 ml/hr).    BP (!) 147/65   Pulse 104   Temp 99.5 °F (37.5 °C) (Oral)   Resp 18   Ht 5' 4" (1.626 m)   Wt 79.5 kg (175 lb 4.3 oz)   SpO2 96%   Breastfeeding? No   BMI 30.08 kg/m²      Labs Reviewed and Include    Recent Labs   Lab 05/04/19  0438   *   CALCIUM 9.2   ALBUMIN 1.7*   PROT 6.6      K 3.1*   CO2 31*   CL 99   BUN 17   CREATININE 0.7   ALKPHOS 143*   ALT 26   AST 23   BILITOT 1.8*     Lab Results   Component Value Date    WBC 11.78 " 05/04/2019    WBC 11.78 05/04/2019    HGB 8.3 (L) 05/04/2019    HGB 8.3 (L) 05/04/2019    HCT 26.9 (L) 05/04/2019    HCT 26.9 (L) 05/04/2019    MCV 87 05/04/2019    MCV 87 05/04/2019     (H) 05/04/2019     (H) 05/04/2019     No results for input(s): TSH, FREET4 in the last 168 hours.  Lab Results   Component Value Date    HGBA1C 10.8 (H) 02/19/2019       Nutritional status:   Body mass index is 30.08 kg/m².  Lab Results   Component Value Date    ALBUMIN 1.7 (L) 05/04/2019    ALBUMIN 1.7 (L) 05/03/2019    ALBUMIN 1.7 (L) 05/02/2019     No results found for: PREALBUMIN    Estimated Creatinine Clearance: 89.3 mL/min (based on SCr of 0.7 mg/dL).    Accu-Checks  Recent Labs     05/02/19  2211 05/03/19  0010 05/03/19  0208 05/03/19  0515 05/03/19  0728 05/03/19  1157 05/03/19  1719 05/03/19  2102 05/04/19  0204 05/04/19  0824   POCTGLUCOSE 123* 168* 176* 173* 168* 172* 150* 147* 138* 157*       Current Medications and/or Treatments Impacting Glycemic Control  Immunotherapy:    Immunosuppressants     None        Steroids:   Hormones (From admission, onward)    None        Pressors:    Autonomic Drugs (From admission, onward)    None        Hyperglycemia/Diabetes Medications:   Antihyperglycemics (From admission, onward)    Start     Stop Route Frequency Ordered    05/03/19 1130  insulin aspart U-100 pen 3-6 Units      -- SubQ 3 times daily with meals 05/03/19 0836    05/03/19 0945  insulin regular (Humulin R) 100 Units in sodium chloride 0.9% 100 mL infusion      -- IV Continuous 05/03/19 0836    05/03/19 0935  insulin aspart U-100 pen 0-5 Units      -- SubQ As needed (PRN) 05/03/19 0836          ASSESSMENT and PLAN    * Ureteral transection of left native kidney  Managed per primary team  Avoid hypoglycemia        Type 2 diabetes mellitus with diabetic peripheral angiopathy without gangrene  BG goal 140 - 180  BG is within or below goal on tansition IV insulin Infusion.     Increase transition IV insulin  infusion with stepdown parameters. Initial rate starting at 1.5 (BG is reasonably controlled. However has started TF with increasing rate).  Discontinue Novolog 3-6 units once patient start consuming regular meals.   Low Dose SQ Insulin Correction Scale.  BG monitoring every 4 hours while on TF.        ** Please notify Endocrine for any change and/or advance in diet/TF**  ** Please call Endocrine for any BG related issues **    Discharge Recommendations:     TBD.             CAD (coronary artery disease)    Managed per primary team  Condition may cause insulin resistance       PAPA (acute kidney injury)  Caution with insulin stacking        HLD (hyperlipidemia)  Managed per primary team  Condition may cause insulin resistance         Sleep apnea  May affect BG readings if uncontrolled        On enteral nutrition  May cause BG excursions.           Uriah Holder, NP  Endocrinology  [facility]

## 2019-05-05 LAB
ALBUMIN SERPL BCP-MCNC: 1.7 G/DL (ref 3.5–5.2)
ALP SERPL-CCNC: 107 U/L (ref 55–135)
ALT SERPL W/O P-5'-P-CCNC: 19 U/L (ref 10–44)
ANION GAP SERPL CALC-SCNC: 9 MMOL/L (ref 8–16)
APTT BLDCRRT: 26.2 SEC (ref 21–32)
APTT BLDCRRT: 27.6 SEC (ref 21–32)
APTT BLDCRRT: 28.1 SEC (ref 21–32)
AST SERPL-CCNC: 16 U/L (ref 10–40)
BACTERIA BLD CULT: NORMAL
BACTERIA BLD CULT: NORMAL
BASOPHILS # BLD AUTO: 0.06 K/UL (ref 0–0.2)
BASOPHILS # BLD AUTO: 0.06 K/UL (ref 0–0.2)
BASOPHILS # BLD AUTO: 0.07 K/UL (ref 0–0.2)
BASOPHILS # BLD AUTO: 0.08 K/UL (ref 0–0.2)
BASOPHILS NFR BLD: 0.5 % (ref 0–1.9)
BASOPHILS NFR BLD: 0.5 % (ref 0–1.9)
BASOPHILS NFR BLD: 0.7 % (ref 0–1.9)
BASOPHILS NFR BLD: 0.8 % (ref 0–1.9)
BILIRUB SERPL-MCNC: 1.1 MG/DL (ref 0.1–1)
BUN SERPL-MCNC: 15 MG/DL (ref 6–20)
C DIFF GDH STL QL: NEGATIVE
C DIFF TOX A+B STL QL IA: NEGATIVE
CALCIUM SERPL-MCNC: 8.5 MG/DL (ref 8.7–10.5)
CHLORIDE SERPL-SCNC: 99 MMOL/L (ref 95–110)
CO2 SERPL-SCNC: 31 MMOL/L (ref 23–29)
CREAT SERPL-MCNC: 0.8 MG/DL (ref 0.5–1.4)
DIFFERENTIAL METHOD: ABNORMAL
EOSINOPHIL # BLD AUTO: 0.4 K/UL (ref 0–0.5)
EOSINOPHIL # BLD AUTO: 0.5 K/UL (ref 0–0.5)
EOSINOPHIL NFR BLD: 3.5 % (ref 0–8)
EOSINOPHIL NFR BLD: 3.8 % (ref 0–8)
EOSINOPHIL NFR BLD: 4.1 % (ref 0–8)
EOSINOPHIL NFR BLD: 4.6 % (ref 0–8)
ERYTHROCYTE [DISTWIDTH] IN BLOOD BY AUTOMATED COUNT: 13 % (ref 11.5–14.5)
ERYTHROCYTE [DISTWIDTH] IN BLOOD BY AUTOMATED COUNT: 13.1 % (ref 11.5–14.5)
ERYTHROCYTE [DISTWIDTH] IN BLOOD BY AUTOMATED COUNT: 13.1 % (ref 11.5–14.5)
ERYTHROCYTE [DISTWIDTH] IN BLOOD BY AUTOMATED COUNT: 13.2 % (ref 11.5–14.5)
EST. GFR  (AFRICAN AMERICAN): >60 ML/MIN/1.73 M^2
EST. GFR  (NON AFRICAN AMERICAN): >60 ML/MIN/1.73 M^2
GLUCOSE SERPL-MCNC: 165 MG/DL (ref 70–110)
HCT VFR BLD AUTO: 22.5 % (ref 37–48.5)
HCT VFR BLD AUTO: 23 % (ref 37–48.5)
HCT VFR BLD AUTO: 23.6 % (ref 37–48.5)
HCT VFR BLD AUTO: 24.6 % (ref 37–48.5)
HGB BLD-MCNC: 7.1 G/DL (ref 12–16)
HGB BLD-MCNC: 7.2 G/DL (ref 12–16)
HGB BLD-MCNC: 7.2 G/DL (ref 12–16)
HGB BLD-MCNC: 7.8 G/DL (ref 12–16)
IMM GRANULOCYTES # BLD AUTO: 0.07 K/UL (ref 0–0.04)
IMM GRANULOCYTES # BLD AUTO: 0.09 K/UL (ref 0–0.04)
IMM GRANULOCYTES # BLD AUTO: 0.09 K/UL (ref 0–0.04)
IMM GRANULOCYTES # BLD AUTO: 0.11 K/UL (ref 0–0.04)
IMM GRANULOCYTES NFR BLD AUTO: 0.6 % (ref 0–0.5)
IMM GRANULOCYTES NFR BLD AUTO: 0.9 % (ref 0–0.5)
IMM GRANULOCYTES NFR BLD AUTO: 0.9 % (ref 0–0.5)
IMM GRANULOCYTES NFR BLD AUTO: 1 % (ref 0–0.5)
INR PPP: 1.1 (ref 0.8–1.2)
LYMPHOCYTES # BLD AUTO: 1.3 K/UL (ref 1–4.8)
LYMPHOCYTES # BLD AUTO: 1.6 K/UL (ref 1–4.8)
LYMPHOCYTES # BLD AUTO: 1.6 K/UL (ref 1–4.8)
LYMPHOCYTES # BLD AUTO: 1.7 K/UL (ref 1–4.8)
LYMPHOCYTES NFR BLD: 13.6 % (ref 18–48)
LYMPHOCYTES NFR BLD: 14.1 % (ref 18–48)
LYMPHOCYTES NFR BLD: 14.9 % (ref 18–48)
LYMPHOCYTES NFR BLD: 16.9 % (ref 18–48)
MAGNESIUM SERPL-MCNC: 1.5 MG/DL (ref 1.6–2.6)
MAGNESIUM SERPL-MCNC: 2.1 MG/DL (ref 1.6–2.6)
MCH RBC QN AUTO: 26.4 PG (ref 27–31)
MCH RBC QN AUTO: 26.7 PG (ref 27–31)
MCH RBC QN AUTO: 27 PG (ref 27–31)
MCH RBC QN AUTO: 27.1 PG (ref 27–31)
MCHC RBC AUTO-ENTMCNC: 30.5 G/DL (ref 32–36)
MCHC RBC AUTO-ENTMCNC: 31.3 G/DL (ref 32–36)
MCHC RBC AUTO-ENTMCNC: 31.6 G/DL (ref 32–36)
MCHC RBC AUTO-ENTMCNC: 31.7 G/DL (ref 32–36)
MCV RBC AUTO: 85 FL (ref 82–98)
MCV RBC AUTO: 85 FL (ref 82–98)
MCV RBC AUTO: 86 FL (ref 82–98)
MCV RBC AUTO: 86 FL (ref 82–98)
MONOCYTES # BLD AUTO: 1.1 K/UL (ref 0.3–1)
MONOCYTES # BLD AUTO: 1.2 K/UL (ref 0.3–1)
MONOCYTES NFR BLD: 10.4 % (ref 4–15)
MONOCYTES NFR BLD: 11.1 % (ref 4–15)
MONOCYTES NFR BLD: 11.3 % (ref 4–15)
MONOCYTES NFR BLD: 9.9 % (ref 4–15)
NEUTROPHILS # BLD AUTO: 6.8 K/UL (ref 1.8–7.7)
NEUTROPHILS # BLD AUTO: 6.8 K/UL (ref 1.8–7.7)
NEUTROPHILS # BLD AUTO: 7.7 K/UL (ref 1.8–7.7)
NEUTROPHILS # BLD AUTO: 7.8 K/UL (ref 1.8–7.7)
NEUTROPHILS NFR BLD: 66.5 % (ref 38–73)
NEUTROPHILS NFR BLD: 69.3 % (ref 38–73)
NEUTROPHILS NFR BLD: 69.4 % (ref 38–73)
NEUTROPHILS NFR BLD: 70.7 % (ref 38–73)
NRBC BLD-RTO: 0 /100 WBC
PHOSPHATE SERPL-MCNC: 2.6 MG/DL (ref 2.7–4.5)
PLATELET # BLD AUTO: 524 K/UL (ref 150–350)
PLATELET # BLD AUTO: 559 K/UL (ref 150–350)
PLATELET # BLD AUTO: 563 K/UL (ref 150–350)
PLATELET # BLD AUTO: 599 K/UL (ref 150–350)
PMV BLD AUTO: 10.3 FL (ref 9.2–12.9)
PMV BLD AUTO: 10.9 FL (ref 9.2–12.9)
PMV BLD AUTO: 11 FL (ref 9.2–12.9)
PMV BLD AUTO: 11 FL (ref 9.2–12.9)
POCT GLUCOSE: 162 MG/DL (ref 70–110)
POCT GLUCOSE: 164 MG/DL (ref 70–110)
POCT GLUCOSE: 176 MG/DL (ref 70–110)
POCT GLUCOSE: 193 MG/DL (ref 70–110)
POCT GLUCOSE: 194 MG/DL (ref 70–110)
POCT GLUCOSE: 296 MG/DL (ref 70–110)
POTASSIUM SERPL-SCNC: 3.5 MMOL/L (ref 3.5–5.1)
POTASSIUM SERPL-SCNC: 3.6 MMOL/L (ref 3.5–5.1)
PROT SERPL-MCNC: 5.7 G/DL (ref 6–8.4)
PROTHROMBIN TIME: 10.9 SEC (ref 9–12.5)
RBC # BLD AUTO: 2.63 M/UL (ref 4–5.4)
RBC # BLD AUTO: 2.7 M/UL (ref 4–5.4)
RBC # BLD AUTO: 2.73 M/UL (ref 4–5.4)
RBC # BLD AUTO: 2.88 M/UL (ref 4–5.4)
SODIUM SERPL-SCNC: 139 MMOL/L (ref 136–145)
VANCOMYCIN TROUGH SERPL-MCNC: 17.8 UG/ML (ref 10–22)
WBC # BLD AUTO: 10.28 K/UL (ref 3.9–12.7)
WBC # BLD AUTO: 11.04 K/UL (ref 3.9–12.7)
WBC # BLD AUTO: 11.05 K/UL (ref 3.9–12.7)
WBC # BLD AUTO: 9.87 K/UL (ref 3.9–12.7)

## 2019-05-05 PROCEDURE — 25000003 PHARM REV CODE 250: Performed by: UROLOGY

## 2019-05-05 PROCEDURE — 85730 THROMBOPLASTIN TIME PARTIAL: CPT

## 2019-05-05 PROCEDURE — 27000221 HC OXYGEN, UP TO 24 HOURS

## 2019-05-05 PROCEDURE — 80202 ASSAY OF VANCOMYCIN: CPT

## 2019-05-05 PROCEDURE — 63600175 PHARM REV CODE 636 W HCPCS: Performed by: STUDENT IN AN ORGANIZED HEALTH CARE EDUCATION/TRAINING PROGRAM

## 2019-05-05 PROCEDURE — 87449 NOS EACH ORGANISM AG IA: CPT

## 2019-05-05 PROCEDURE — 85730 THROMBOPLASTIN TIME PARTIAL: CPT | Mod: 91

## 2019-05-05 PROCEDURE — 99232 SBSQ HOSP IP/OBS MODERATE 35: CPT | Mod: ,,, | Performed by: NURSE PRACTITIONER

## 2019-05-05 PROCEDURE — C9113 INJ PANTOPRAZOLE SODIUM, VIA: HCPCS | Performed by: STUDENT IN AN ORGANIZED HEALTH CARE EDUCATION/TRAINING PROGRAM

## 2019-05-05 PROCEDURE — 83735 ASSAY OF MAGNESIUM: CPT

## 2019-05-05 PROCEDURE — 25000003 PHARM REV CODE 250: Performed by: NURSE PRACTITIONER

## 2019-05-05 PROCEDURE — 83735 ASSAY OF MAGNESIUM: CPT | Mod: 91

## 2019-05-05 PROCEDURE — 99232 PR SUBSEQUENT HOSPITAL CARE,LEVL II: ICD-10-PCS | Mod: ,,, | Performed by: NURSE PRACTITIONER

## 2019-05-05 PROCEDURE — 63600175 PHARM REV CODE 636 W HCPCS: Performed by: ANESTHESIOLOGY

## 2019-05-05 PROCEDURE — 94761 N-INVAS EAR/PLS OXIMETRY MLT: CPT

## 2019-05-05 PROCEDURE — 99233 SBSQ HOSP IP/OBS HIGH 50: CPT | Mod: 24,GC,, | Performed by: SURGERY

## 2019-05-05 PROCEDURE — 99900035 HC TECH TIME PER 15 MIN (STAT)

## 2019-05-05 PROCEDURE — 25000003 PHARM REV CODE 250: Performed by: ANESTHESIOLOGY

## 2019-05-05 PROCEDURE — 20000000 HC ICU ROOM

## 2019-05-05 PROCEDURE — 85025 COMPLETE CBC W/AUTO DIFF WBC: CPT | Mod: 91

## 2019-05-05 PROCEDURE — 63600175 PHARM REV CODE 636 W HCPCS: Performed by: UROLOGY

## 2019-05-05 PROCEDURE — 99900026 HC AIRWAY MAINTENANCE (STAT)

## 2019-05-05 PROCEDURE — 84100 ASSAY OF PHOSPHORUS: CPT

## 2019-05-05 PROCEDURE — 85610 PROTHROMBIN TIME: CPT

## 2019-05-05 PROCEDURE — 25000003 PHARM REV CODE 250: Performed by: STUDENT IN AN ORGANIZED HEALTH CARE EDUCATION/TRAINING PROGRAM

## 2019-05-05 PROCEDURE — 63600175 PHARM REV CODE 636 W HCPCS: Performed by: PHYSICIAN ASSISTANT

## 2019-05-05 PROCEDURE — 63600175 PHARM REV CODE 636 W HCPCS: Performed by: NURSE PRACTITIONER

## 2019-05-05 PROCEDURE — 99233 PR SUBSEQUENT HOSPITAL CARE,LEVL III: ICD-10-PCS | Mod: 24,GC,, | Performed by: SURGERY

## 2019-05-05 PROCEDURE — 80053 COMPREHEN METABOLIC PANEL: CPT

## 2019-05-05 PROCEDURE — 25000003 PHARM REV CODE 250: Performed by: PHYSICIAN ASSISTANT

## 2019-05-05 PROCEDURE — 84132 ASSAY OF SERUM POTASSIUM: CPT

## 2019-05-05 RX ORDER — LANOLIN ALCOHOL/MO/W.PET/CERES
800 CREAM (GRAM) TOPICAL
Status: DISCONTINUED | OUTPATIENT
Start: 2019-05-05 | End: 2019-05-08

## 2019-05-05 RX ORDER — SODIUM,POTASSIUM PHOSPHATES 280-250MG
2 POWDER IN PACKET (EA) ORAL
Status: DISCONTINUED | OUTPATIENT
Start: 2019-05-05 | End: 2019-05-08

## 2019-05-05 RX ORDER — GLUCAGON 1 MG
1 KIT INJECTION
Status: DISCONTINUED | OUTPATIENT
Start: 2019-05-05 | End: 2019-05-08

## 2019-05-05 RX ORDER — IBUPROFEN 200 MG
16 TABLET ORAL
Status: DISCONTINUED | OUTPATIENT
Start: 2019-05-05 | End: 2019-05-08

## 2019-05-05 RX ORDER — INSULIN ASPART 100 [IU]/ML
0-5 INJECTION, SOLUTION INTRAVENOUS; SUBCUTANEOUS
Status: DISCONTINUED | OUTPATIENT
Start: 2019-05-05 | End: 2019-05-07

## 2019-05-05 RX ORDER — PANTOPRAZOLE SODIUM 40 MG/10ML
40 INJECTION, POWDER, LYOPHILIZED, FOR SOLUTION INTRAVENOUS 2 TIMES DAILY
Status: DISCONTINUED | OUTPATIENT
Start: 2019-05-05 | End: 2019-05-07

## 2019-05-05 RX ORDER — POTASSIUM CHLORIDE 20 MEQ/15ML
60 SOLUTION ORAL
Status: DISCONTINUED | OUTPATIENT
Start: 2019-05-05 | End: 2019-05-08

## 2019-05-05 RX ORDER — POTASSIUM CHLORIDE 20 MEQ/15ML
40 SOLUTION ORAL
Status: DISCONTINUED | OUTPATIENT
Start: 2019-05-05 | End: 2019-05-08

## 2019-05-05 RX ORDER — NYSTATIN AND TRIAMCINOLONE ACETONIDE 100000; 1 [USP'U]/G; MG/G
CREAM TOPICAL 2 TIMES DAILY
Status: DISCONTINUED | OUTPATIENT
Start: 2019-05-05 | End: 2019-05-09

## 2019-05-05 RX ORDER — IBUPROFEN 200 MG
24 TABLET ORAL
Status: DISCONTINUED | OUTPATIENT
Start: 2019-05-05 | End: 2019-05-08

## 2019-05-05 RX ADMIN — PANTOPRAZOLE SODIUM 40 MG: 40 INJECTION, POWDER, LYOPHILIZED, FOR SOLUTION INTRAVENOUS at 08:05

## 2019-05-05 RX ADMIN — RIFAMPIN 300 MG: 600 INJECTION, POWDER, LYOPHILIZED, FOR SOLUTION INTRAVENOUS at 03:05

## 2019-05-05 RX ADMIN — INSULIN ASPART 1 UNITS: 100 INJECTION, SOLUTION INTRAVENOUS; SUBCUTANEOUS at 04:05

## 2019-05-05 RX ADMIN — INSULIN ASPART 1 UNITS: 100 INJECTION, SOLUTION INTRAVENOUS; SUBCUTANEOUS at 06:05

## 2019-05-05 RX ADMIN — CHLORHEXIDINE GLUCONATE 0.12% ORAL RINSE 15 ML: 1.2 LIQUID ORAL at 09:05

## 2019-05-05 RX ADMIN — FENTANYL CITRATE 50 MCG: 50 INJECTION INTRAMUSCULAR; INTRAVENOUS at 09:05

## 2019-05-05 RX ADMIN — FENTANYL CITRATE 50 MCG: 50 INJECTION INTRAMUSCULAR; INTRAVENOUS at 10:05

## 2019-05-05 RX ADMIN — VANCOMYCIN HYDROCHLORIDE 1000 MG: 1 INJECTION, POWDER, LYOPHILIZED, FOR SOLUTION INTRAVENOUS at 07:05

## 2019-05-05 RX ADMIN — VANCOMYCIN HYDROCHLORIDE 1000 MG: 1 INJECTION, POWDER, LYOPHILIZED, FOR SOLUTION INTRAVENOUS at 09:05

## 2019-05-05 RX ADMIN — FENTANYL CITRATE 50 MCG: 50 INJECTION INTRAMUSCULAR; INTRAVENOUS at 02:05

## 2019-05-05 RX ADMIN — INSULIN ASPART 3 UNITS: 100 INJECTION, SOLUTION INTRAVENOUS; SUBCUTANEOUS at 02:05

## 2019-05-05 RX ADMIN — MAGNESIUM SULFATE HEPTAHYDRATE 2 G: 40 INJECTION, SOLUTION INTRAVENOUS at 06:05

## 2019-05-05 RX ADMIN — RIFAMPIN 300 MG: 600 INJECTION, POWDER, LYOPHILIZED, FOR SOLUTION INTRAVENOUS at 02:05

## 2019-05-05 RX ADMIN — CEFTRIAXONE 2 G: 2 INJECTION, SOLUTION INTRAVENOUS at 04:05

## 2019-05-05 RX ADMIN — PANTOPRAZOLE SODIUM 40 MG: 40 INJECTION, POWDER, LYOPHILIZED, FOR SOLUTION INTRAVENOUS at 09:05

## 2019-05-05 RX ADMIN — FENTANYL CITRATE 50 MCG: 50 INJECTION INTRAMUSCULAR; INTRAVENOUS at 11:05

## 2019-05-05 RX ADMIN — NYSTATIN AND TRIAMCINOLONE ACETONIDE: 100000; 1 CREAM TOPICAL at 08:05

## 2019-05-05 RX ADMIN — NYSTATIN AND TRIAMCINOLONE ACETONIDE: 100000; 1 CREAM TOPICAL at 03:05

## 2019-05-05 RX ADMIN — SODIUM CHLORIDE 1.5 UNITS/HR: 9 INJECTION, SOLUTION INTRAVENOUS at 08:05

## 2019-05-05 RX ADMIN — INSULIN ASPART 1 UNITS: 100 INJECTION, SOLUTION INTRAVENOUS; SUBCUTANEOUS at 08:05

## 2019-05-05 RX ADMIN — POTASSIUM CHLORIDE 40 MEQ: 20 SOLUTION ORAL at 06:05

## 2019-05-05 RX ADMIN — FENTANYL CITRATE 50 MCG: 50 INJECTION INTRAMUSCULAR; INTRAVENOUS at 08:05

## 2019-05-05 NOTE — PROGRESS NOTES
Called team regarding aptt result of 25.3. Ordered by MD Du to hold bolus of 60 U/kg but to increase dose by 3 U/kg/hr as per nomogram to 24 U/kg/hr.

## 2019-05-05 NOTE — ASSESSMENT & PLAN NOTE
BG goal 140 - 180    BG is within or below goal on tansition IV insulin Infusion.     continue transition IV insulin infusion with stepdown parameters. Initial rate starting at 1.5 (BG is reasonably controlled while on TF. TF are now at goal).  Low Dose SQ Insulin Correction Scale.  BG monitoring every 4 hours while on TF.        ** Please notify Endocrine for any change and/or advance in diet/TF**  ** Please call Endocrine for any BG related issues **    Discharge Recommendations:     TBD.

## 2019-05-05 NOTE — PROGRESS NOTES
Subjective/Objective  Interval History/Significant Events: Doing well on trach collar overnight. Frequent BMs, one episode of blood tinged BM this am. Hep gtt held.     Follow-up For: Procedure(s) (LRB):  Creation, Nephrostomy, Percutaneous (Left)    Post-Operative Day: 14 Days Post-Op     Objective:     Vital Signs (Most Recent):  Temp: 100 °F (37.8 °C) (05/05/19 0701)  Pulse: 101 (05/05/19 0701)  Resp: (!) 21 (05/04/19 1600)  BP: (!) 159/69 (05/05/19 0701)  SpO2: 98 % (05/05/19 0701) Vital Signs (24h Range):  Temp:  [98.5 °F (36.9 °C)-100 °F (37.8 °C)] 100 °F (37.8 °C)  Pulse:  [] 101  Resp:  [20-24] 21  SpO2:  [92 %-100 %] 98 %  BP: (123-180)/(58-98) 159/69     Weight: 79.5 kg (175 lb 4.3 oz)  Body mass index is 30.08 kg/m².      Intake/Output Summary (Last 24 hours) at 5/5/2019 0818  Last data filed at 5/5/2019 0701  Gross per 24 hour   Intake 2612.43 ml   Output 1685 ml   Net 927.43 ml       Physical Exam   Constitutional: She appears well-developed and well-nourished.   HENT:   Head: Normocephalic and atraumatic.   Tracheostomy in place   Eyes: Right eye exhibits no discharge. Left eye exhibits no discharge.   Neck: Normal range of motion. Neck supple.   Cardiovascular: Normal rate and regular rhythm.   Pulmonary/Chest: Effort normal. No respiratory distress.   Trach collar.    Abdominal:   Midline incision c/d/i.  MADHURI with scant serous drainage. PEG draining normal gastric contents. Abdomen soft, non-distended. Rectal tube with watery brown output.     Genitourinary:   Genitourinary Comments: Nephrostomy tube in place with watery dark yellow output.  Fontenot catheter+   Musculoskeletal: Normal range of motion.   Neurological:   Able to follow commands, denying pain.    Skin: Skin is warm and dry.   Vitals reviewed.      Lines/Drains/Airways     Drain                 Closed/Suction Drain 04/21/19 0300 LLQ 14 days         Nephrostomy 04/21/19 0629 Left 8 Fr. 14 days         Urethral Catheter 04/20/19 6042  Latex 16 Fr. 14 days         Gastrostomy/Enterostomy 05/02/19 1134 Percutaneous endoscopic gastrostomy (PEG) LUQ decompression;feeding 2 days          Airway                 Surgical Airway 05/02/19 1107 Shiley Cuffed 2 days          Peripheral Intravenous Line                 Peripheral IV - Single Lumen 04/30/19 1505 20 G Right Forearm 4 days         Peripheral IV - Single Lumen 05/03/19 0244 22 G Left Forearm 2 days         Midline Catheter Insertion/Assessment  - Single Lumen 05/03/19 1706 Left basilic vein (medial side of arm) 18g x 10cm 1 day                Significant Labs:    ABG:  None in the last 48 hrs    CBC/Anemia Profile:  Recent Labs   Lab 05/03/19  2211 05/04/19  0438 05/05/19  0412   WBC 12.22 11.78  11.78 9.87   HGB 8.1* 8.3*  8.3* 7.2*   HCT 25.4* 26.9*  26.9* 23.6*   * 529*  529* 524*   MCV 85 87  87 86   RDW 13.2 13.2  13.2 13.1        Chemistries:  Recent Labs   Lab 05/04/19  0438 05/04/19  1125 05/05/19  0412     --  139   K 3.1* 3.2* 3.6   CL 99  --  99   CO2 31*  --  31*   BUN 17  --  15   CREATININE 0.7  --  0.8   CALCIUM 9.2  --  8.5*   ALBUMIN 1.7*  --  1.7*   PROT 6.6  --  5.7*   BILITOT 1.8*  --  1.1*   ALKPHOS 143*  --  107   ALT 26  --  19   AST 23  --  16   MG 1.9  --  1.5*   PHOS 2.1*  --  2.6*       Significant Imaging:  I have reviewed all pertinent imaging results/findings within the past 24 hours.      Scheduled Meds:   cefTRIAXone (ROCEPHIN) IVPB  2 g Intravenous Q24H    chlorhexidine  15 mL Mouth/Throat BID    lorazepam  1 mg Intravenous Once    pantoprazole  40 mg Intravenous BID    psyllium husk (aspartame)  1 packet Per G Tube Daily    rifAMpin (RIFADIN) IVPB  300 mg Intravenous Q12H    vancomycin (VANCOCIN) IVPB  1,000 mg Intravenous Q12H     Continuous Infusions:   heparin (porcine) in D5W Stopped (05/05/19 0600)    insulin (HUMAN R) infusion (adults) 1.5 Units/hr (05/05/19 0800)     PRN Meds:acetaminophen, albuterol-ipratropium, calcium  gluconate IVPB, calcium gluconate IVPB, calcium gluconate IVPB, dextrose 50%, dextrose 50%, dextrose 50%, dextrose 50%, [] fentaNYL **FOLLOWED BY** fentaNYL, glucagon (human recombinant), glucagon (human recombinant), glucose, glucose, glucose, glucose, glycopyrrolate, hydrALAZINE, insulin aspart U-100, insulin aspart U-100, magnesium sulfate IVPB, magnesium sulfate IVPB, nitroGLYCERIN, ondansetron, potassium chloride in water **AND** potassium chloride in water **AND** potassium chloride in water, promethazine (PHENERGAN) IVPB, ramelteon, sodium chloride 0.9%    Vital signs in last 24 hours:  Temp:  [98.5 °F (36.9 °C)-100 °F (37.8 °C)] 100 °F (37.8 °C)  Pulse:  [] 105  Resp:  [20-24] 21  SpO2:  [92 %-100 %] 98 %  BP: (123-180)/(58-98) 152/66    Intake/Output last 3 shifts:  I/O last 3 completed shifts:  In: 3178.8 [I.V.:1578.8; NG/GT:1600]  Out: 2146 [Urine:2100; Drains:46]  Intake/Output this shift:  I/O this shift:  In: 40 [NG/GT:40]  Out: 10 [Urine:10]    Problem Assessment/Plan  Cardiac/Vascular  CAD (coronary artery disease)  Assessment & Plan  ASSESSMENT/PLAN:   Mariann Huff is a 59 y.o. female s/p left ureteral injury on , who presented with fever, AMS, and intraabdominal abscess, taken for washout and ligation of left ureter with nephrostomy tube placement on . S/p Trach/PEG on .     Neuro:   - Pain control with fentanyl prn   - Romeltion prn     Pulmonary:   - Been tolerating trach collar since 5/3  - CXR prn  - ABGs prn      Cardiac:   - HDS at this time.  Pt has been weaned off pressors.   - TTE ordered yesterday, EF 55%     Renal:    - S/p transection of left ureter  and subsequent ligation and PCT nephrostomy tube placement with IR   - Renal function improving.    - UOP 1.6 L total, net + 900 cc  - Monitor I/Os  - Hold Lasix      ID:   - Blood cultures  +E. Coli; sensitivities pending. Repeat Bld Cx show NGTD.   - UCx from  growing E. Coli; sensitivities are  now back. Repeat Cx pending.   - ID following for assistance with abx therapy, currently on Ceftriaxone, Rifampin and vancomycin.  - AF overnight  - WBC trending down     Hem/Onc:   - H/H decreased overnight to 7.2 from 8.3; cont to monitor  - Hep gtt started for RUE DVT, however holding this for now given bloody BM this am     Endocrine:  - DM Type 2 at baseline    - TF at goal  - Will transition to SSI   - Endocrine following for assistance with blood glucose control.      Fluids/Electrolytes/Nutrition/GI:   # Shock Liver          - Resolved           - Labs continue to show improvement          - Elevated LFTs now normalized          - Thrombocytopenia; plts count improving; plts normalized          - INR normalized to 0.9     # Lactic Acidosis          - Now resolved    # Diarrhea  - TF at goal now, will add bulking agents  - Frequent BMs, some bloody, will recheck C.Diff today  - Start free water flushes 300 cc q6h  - Replace electrolytes PRN  - Will contact GI or CRS if bloody BMs continue        PPx:  - DVT: Lovenox        DISPO:  - Continue SICU care  - PT/OT/SLP consults placed              Critical care was time spent personally by me on the following activities: development of treatment plan with patient or surrogate and bedside caregivers, discussions with consultants, evaluation of patient's response to treatment, examination of patient, ordering and performing treatments and interventions, ordering and review of laboratory studies, ordering and review of radiographic studies, pulse oximetry, re-evaluation of patient's condition.  This critical care time did not overlap with that of any other provider or involve time for any procedures.     Zoë Du MD CA-1  Critical Care - Surgery

## 2019-05-05 NOTE — SUBJECTIVE & OBJECTIVE
"Interval HPI:   Overnight events:  BG is within, or slightly above goal on current IV insulin infusion. NAEON.     Eating:   NPO  Nausea: No  Hypoglycemia and intervention: No  Fever: No  TPN and/or TF: No  If yes, type of TF/TPN and rate: none    BP (!) 130/58 (BP Location: Right leg, Patient Position: Lying)   Pulse 109   Temp 100 °F (37.8 °C) (Oral)   Resp (!) 25   Ht 5' 4" (1.626 m)   Wt 79.5 kg (175 lb 4.3 oz)   SpO2 97%   Breastfeeding? No   BMI 30.08 kg/m²     Labs Reviewed and Include    Recent Labs   Lab 05/05/19  0412   *   CALCIUM 8.5*   ALBUMIN 1.7*   PROT 5.7*      K 3.6   CO2 31*   CL 99   BUN 15   CREATININE 0.8   ALKPHOS 107   ALT 19   AST 16   BILITOT 1.1*     Lab Results   Component Value Date    WBC 11.04 05/05/2019    HGB 7.8 (L) 05/05/2019    HCT 24.6 (L) 05/05/2019    MCV 85 05/05/2019     (H) 05/05/2019     No results for input(s): TSH, FREET4 in the last 168 hours.  Lab Results   Component Value Date    HGBA1C 10.8 (H) 02/19/2019       Nutritional status:   Body mass index is 30.08 kg/m².  Lab Results   Component Value Date    ALBUMIN 1.7 (L) 05/05/2019    ALBUMIN 1.7 (L) 05/04/2019    ALBUMIN 1.7 (L) 05/03/2019     No results found for: PREALBUMIN    Estimated Creatinine Clearance: 78.2 mL/min (based on SCr of 0.8 mg/dL).    Accu-Checks  Recent Labs     05/03/19  1157 05/03/19  1719 05/03/19  2102 05/04/19  0204 05/04/19  0824 05/04/19  1139 05/04/19  1743 05/04/19  1930 05/04/19  2300 05/05/19  0419   POCTGLUCOSE 172* 150* 147* 138* 157* 200* 173* 175* 189* 193*       Current Medications and/or Treatments Impacting Glycemic Control  Immunotherapy:    Immunosuppressants     None        Steroids:   Hormones (From admission, onward)    None        Pressors:    Autonomic Drugs (From admission, onward)    None        Hyperglycemia/Diabetes Medications:   Antihyperglycemics (From admission, onward)    Start     Stop Route Frequency Ordered    05/05/19 4938  insulin " aspart U-100 pen 0-5 Units      -- SubQ Before meals & nightly PRN 05/05/19 0825    05/03/19 0945  insulin regular (Humulin R) 100 Units in sodium chloride 0.9% 100 mL infusion      -- IV Continuous 05/03/19 0836    05/03/19 0935  insulin aspart U-100 pen 0-5 Units      -- SubQ As needed (PRN) 05/03/19 0836

## 2019-05-05 NOTE — PROGRESS NOTES
Per MD Camp, do not increase heparin dose as a result of one bloody bowel movement- okay to stay at 30 units/kg/hr on heparin gtt. No potassium replacement needed per MD this AM.

## 2019-05-05 NOTE — ASSESSMENT & PLAN NOTE
Elizaamanda Huff is a 59 y.o. female s/p left ureteral injury on 4/12, presented with fever, AMS, and intraabdominal abscess, taken for washout and ligation of left ureter with neph tube placement on 4/21, Trach/PEG 5/2.    - tube feeds per SICU  - Trach per SICU  - Rocephin, Rifampin, Vancomycin  - Maintain correa, neph tube, and MADHURI drain  - continue ICU care  - may consult GI if she continues to have bloody BMs

## 2019-05-05 NOTE — ASSESSMENT & PLAN NOTE
ASSESSMENT/PLAN:   Mariann Huff is a 59 y.o. female s/p left ureteral injury on 4/12, who presented with fever, AMS, and intraabdominal abscess, taken for washout and ligation of left ureter with nephrostomy tube placement on 4/21. S/p Trach/PEG on 5/2.     Neuro:   - Pain control with fentanyl prn   - Romeltion prn     Pulmonary:   - Been tolerating trach collar since 5/3  - CXR prn  - ABGs prn      Cardiac:   - HDS at this time.  Pt has been weaned off pressors.   - TTE ordered yesterday, EF 55%     Renal:    - S/p transection of left ureter 4/12 and subsequent ligation and PCT nephrostomy tube placement with IR 4/21  - Renal function improving.    - UOP 1.6 L total, net + 900 cc  - Monitor I/Os  - Hold Lasix      ID:   - Blood cultures 4/12 +E. Coli; sensitivities pending. Repeat Bld Cx show NGTD.   - UCx from 4/20 growing E. Coli; sensitivities are now back. Repeat Cx pending.   - ID following for assistance with abx therapy, currently on Ceftriaxone, Rifampin and vancomycin.  - AF overnight  - WBC trending down     Hem/Onc:   - H/H decreased overnight to 7.2 from 8.3; cont to monitor  - Hep gtt started for RUE DVT, however holding this for now given bloody BM this am     Endocrine:  - DM Type 2 at baseline    - TF at goal  - Will transition to SSI   - Endocrine following for assistance with blood glucose control.      Fluids/Electrolytes/Nutrition/GI:   # Shock Liver          - Resolved           - Labs continue to show improvement          - Elevated LFTs now normalized          - Thrombocytopenia; plts count improving; plts normalized          - INR normalized to 0.9     # Lactic Acidosis          - Now resolved    # Diarrhea  - TF at goal now, will add bulking agents  - Frequent BMs, some bloody, will recheck C.Diff today  - Start free water flushes 300 cc q6h  - Replace electrolytes PRN  - Will contact GI or CRS if bloody BMs continue        PPx:  - DVT: Lovenox        DISPO:  - Continue SICU care  -  PT/OT/SLP consults placed              Critical care was time spent personally by me on the following activities: development of treatment plan with patient or surrogate and bedside caregivers, discussions with consultants, evaluation of patient's response to treatment, examination of patient, ordering and performing treatments and interventions, ordering and review of laboratory studies, ordering and review of radiographic studies, pulse oximetry, re-evaluation of patient's condition.  This critical care time did not overlap with that of any other provider or involve time for any procedures.     Zoë Du MD CA-1  Critical Care - Surgery

## 2019-05-05 NOTE — PROGRESS NOTES
[facility]  Urology  Progress Note    Patient Name: Mariann Huff  MRN: [id number]  Admission Date: 4/20/2019  Hospital Length of Stay: 15 days  Code Status: Full Code   Attending Provider: Robin Boyd MD   Primary Care Physician: Jasbir Haney MD    Subjective:     HPI:  Mariann Huff is a 59 y.o. female with history of HTN, type 2 diabetes mellitus, CAD, NSTEMI, and back pain who presents to [facility] with altered mental status and sepsis. She underwent L5-S1 OLIF with NSGY on 4/12 and had intraoperative left ureteral injury with ureteroureteral anastomosis and ureteral stent placement. She did well initially and had correa and MADHURI drain removed on 4/16. She began having chills and altered mental status 2 days ago and this has progressively worsened. No complaints of pain.     She is altered and HPI is limited. In the ED she is febrile to 103 and tachycardic and pressures are low normal. WBC is 5, creatinine is 3.6 baseline 1.0, lactate is 4.6. Cath UA concerning for infection, 3+ LE, >100 WBCs, and many bacteria on microscopy.    CT and MRI abdomen both show air with minimal fluid near the surgical site with left ureter coursing through. There is air throughout the proximal collecting system which is decompressed with JJ ureteral stent in good position.    Taken for ex lap, ligation of left ureter and left neph tube placement on 4/21/19.    Interval History:   Blood mixed with stool yesterday x2. Otherwise no acute events. Heparin gtt stopped.    Review of Systems    Objective:     Temp:  [98.5 °F (36.9 °C)-100 °F (37.8 °C)] 100 °F (37.8 °C)  Pulse:  [] 100  Resp:  [20-24] 21  SpO2:  [92 %-100 %] 98 %  BP: (123-180)/(58-98) 130/58     Body mass index is 30.08 kg/m².    Date 05/05/19 0700 - 05/06/19 0659   Shift 8287-4983 0494-6964 2702-4672 24 Hour Total   INTAKE   NG/   180   Shift Total(mL/kg) 180(2.3)   180(2.3)   OUTPUT   Urine(mL/kg/hr) 100   100   Drains 22   22   Shift  Total(mL/kg) 122(1.5)   122(1.5)   Weight (kg) 79.5 79.5 79.5 79.5          Drains     Drain                 Closed/Suction Drain 04/21/19 0300 LLQ 13 days         Nephrostomy 04/21/19 0629 Left 8 Fr. 13 days         Urethral Catheter 04/20/19 1711 Latex 16 Fr. 13 days         Gastrostomy/Enterostomy 05/02/19 1134 Percutaneous endoscopic gastrostomy (PEG) LUQ decompression;feeding 1 day                Physical Exam   Constitutional: She appears well-developed and well-nourished. No distress.   HENT:   Head: Normocephalic and atraumatic.   Neck: No JVD present.   Cardiovascular: Normal rate and regular rhythm.    Pulmonary/Chest:   On trach collar   Abdominal: Soft. She exhibits no distension. There is no rebound and no guarding.   Incision c/d/i   MADHURI drain with ss output  G-tube   Genitourinary:   Genitourinary Comments: Fontenot draining clear yellow  Left neph tube with clear yellow urine   Neurological: She is alert.   Skin: Skin is warm and dry. She is not diaphoretic. No pallor.       Significant Labs:    BMP:  Recent Labs   Lab 05/03/19  0243 05/04/19  0438 05/04/19  1125 05/05/19  0412    141  --  139   K 3.7 3.1* 3.2* 3.6   CL 98 99  --  99   CO2 29 31*  --  31*   BUN 23* 17  --  15   CREATININE 0.8 0.7  --  0.8   CALCIUM 9.1 9.2  --  8.5*       CBC:   Recent Labs   Lab 05/04/19  0438 05/05/19  0412 05/05/19  0825   WBC 11.78  11.78 9.87 11.04   HGB 8.3*  8.3* 7.2* 7.8*   HCT 26.9*  26.9* 23.6* 24.6*   *  529* 524* 599*       All pertinent labs results from the past 24 hours have been reviewed.    Significant Imaging:  All pertinent imaging results/findings from the past 24 hours have been reviewed.                  Assessment/Plan:     * Ureteral transection of left native kidney  Elizaamanda Huff is a 59 y.o. female s/p left ureteral injury on 4/12, presented with fever, AMS, and intraabdominal abscess, taken for washout and ligation of left ureter with neph tube placement on 4/21, Trach/PEG  5/2.    - tube feeds per SICU  - Trach per SICU  - Rocephin, Rifampin, Vancomycin  - Maintain correa, neph tube, and MADHURI drain  - continue ICU care  - may consult GI if she continues to have bloody BMs    Sepsis  As above        VTE Risk Mitigation (From admission, onward)        Ordered     heparin 25,000 units in dextrose 5% 250 mL (100 units/mL) infusion HIGH INTENSITY nomogram - [facility]  Continuous      05/03/19 2155     Place sequential compression device  Until discontinued      04/20/19 2108     IP VTE HIGH RISK PATIENT  Once      04/20/19 2108          Hugh Higuera MD  Urology  [facility]

## 2019-05-05 NOTE — PLAN OF CARE
Patient alert and follows commands with bilateral upper and lower extremities. HR 100s-110s. SBP<170. Tolerated trach collar on 5L and 28%FIO2  with SPO2>93%. Heparin gtt increased per results on nomogram- see flowsheet for details- no bolus given. Accu checks q4h. TF at 30 cc/hr- goal to 40cc/hr. PEG tube intact with 20-30cc residuals. 300 cc water bolus q6h. Midsternal incision clean dry and intact- see flowsheet for details. Nephrostomy bag and MADHURI drain with serous output-see flowsheet for details. Fontenot with 20-30cc concentrated urine output. 4 bowel movements this shift- two bloody bowel movement noted. Patient repositioned q2h per wedge and pillows. Bath given. Patient and spouse updated on plan of care. Safety precautions maintained. Will continue to monitor. See flowsheet for detailed assessment.

## 2019-05-05 NOTE — SUBJECTIVE & OBJECTIVE
Interval History/Significant Events: Doing well on trach collar overnight. Frequent BMs, one episode of blood tinged BM this am. Hep gtt held.     Follow-up For: Procedure(s) (LRB):  Creation, Nephrostomy, Percutaneous (Left)    Post-Operative Day: 14 Days Post-Op     Objective:     Vital Signs (Most Recent):  Temp: 100 °F (37.8 °C) (05/05/19 0701)  Pulse: 101 (05/05/19 0701)  Resp: (!) 21 (05/04/19 1600)  BP: (!) 159/69 (05/05/19 0701)  SpO2: 98 % (05/05/19 0701) Vital Signs (24h Range):  Temp:  [98.5 °F (36.9 °C)-100 °F (37.8 °C)] 100 °F (37.8 °C)  Pulse:  [] 101  Resp:  [20-24] 21  SpO2:  [92 %-100 %] 98 %  BP: (123-180)/(58-98) 159/69     Weight: 79.5 kg (175 lb 4.3 oz)  Body mass index is 30.08 kg/m².      Intake/Output Summary (Last 24 hours) at 5/5/2019 0818  Last data filed at 5/5/2019 0701  Gross per 24 hour   Intake 2612.43 ml   Output 1685 ml   Net 927.43 ml       Physical Exam   Constitutional: She appears well-developed and well-nourished.   HENT:   Head: Normocephalic and atraumatic.   Tracheostomy in place   Eyes: Right eye exhibits no discharge. Left eye exhibits no discharge.   Neck: Normal range of motion. Neck supple.   Cardiovascular: Normal rate and regular rhythm.   Pulmonary/Chest: Effort normal. No respiratory distress.   Trach collar.    Abdominal:   Midline incision c/d/i.  MADHURI with scant serous drainage. PEG draining normal gastric contents. Abdomen soft, non-distended. Rectal tube with watery brown output.     Genitourinary:   Genitourinary Comments: Nephrostomy tube in place with watery dark yellow output.  Fontenot catheter+   Musculoskeletal: Normal range of motion.   Neurological:   Able to follow commands, denying pain.    Skin: Skin is warm and dry.   Vitals reviewed.      Lines/Drains/Airways     Drain                 Closed/Suction Drain 04/21/19 0300 LLQ 14 days         Nephrostomy 04/21/19 0629 Left 8 Fr. 14 days         Urethral Catheter 04/20/19 1711 Latex 16 Fr. 14 days          Gastrostomy/Enterostomy 05/02/19 1134 Percutaneous endoscopic gastrostomy (PEG) LUQ decompression;feeding 2 days          Airway                 Surgical Airway 05/02/19 1107 Shiley Cuffed 2 days          Peripheral Intravenous Line                 Peripheral IV - Single Lumen 04/30/19 1505 20 G Right Forearm 4 days         Peripheral IV - Single Lumen 05/03/19 0244 22 G Left Forearm 2 days         Midline Catheter Insertion/Assessment  - Single Lumen 05/03/19 1706 Left basilic vein (medial side of arm) 18g x 10cm 1 day                Significant Labs:    ABG:  None in the last 48 hrs    CBC/Anemia Profile:  Recent Labs   Lab 05/03/19  2211 05/04/19  0438 05/05/19  0412   WBC 12.22 11.78  11.78 9.87   HGB 8.1* 8.3*  8.3* 7.2*   HCT 25.4* 26.9*  26.9* 23.6*   * 529*  529* 524*   MCV 85 87  87 86   RDW 13.2 13.2  13.2 13.1        Chemistries:  Recent Labs   Lab 05/04/19  0438 05/04/19  1125 05/05/19  0412     --  139   K 3.1* 3.2* 3.6   CL 99  --  99   CO2 31*  --  31*   BUN 17  --  15   CREATININE 0.7  --  0.8   CALCIUM 9.2  --  8.5*   ALBUMIN 1.7*  --  1.7*   PROT 6.6  --  5.7*   BILITOT 1.8*  --  1.1*   ALKPHOS 143*  --  107   ALT 26  --  19   AST 23  --  16   MG 1.9  --  1.5*   PHOS 2.1*  --  2.6*       Significant Imaging:  I have reviewed all pertinent imaging results/findings within the past 24 hours.

## 2019-05-05 NOTE — SUBJECTIVE & OBJECTIVE
Interval History:   Blood mixed with stool yesterday x2. Otherwise no acute events. Heparin gtt stopped.    Review of Systems    Objective:     Temp:  [98.5 °F (36.9 °C)-100 °F (37.8 °C)] 100 °F (37.8 °C)  Pulse:  [] 100  Resp:  [20-24] 21  SpO2:  [92 %-100 %] 98 %  BP: (123-180)/(58-98) 130/58     Body mass index is 30.08 kg/m².    Date 05/05/19 0700 - 05/06/19 0659   Shift 3058-9354 7051-6605 6638-7125 24 Hour Total   INTAKE   NG/   180   Shift Total(mL/kg) 180(2.3)   180(2.3)   OUTPUT   Urine(mL/kg/hr) 100   100   Drains 22   22   Shift Total(mL/kg) 122(1.5)   122(1.5)   Weight (kg) 79.5 79.5 79.5 79.5          Drains     Drain                 Closed/Suction Drain 04/21/19 0300 LLQ 13 days         Nephrostomy 04/21/19 0629 Left 8 Fr. 13 days         Urethral Catheter 04/20/19 1711 Latex 16 Fr. 13 days         Gastrostomy/Enterostomy 05/02/19 1134 Percutaneous endoscopic gastrostomy (PEG) LUQ decompression;feeding 1 day                Physical Exam   Constitutional: She appears well-developed and well-nourished. No distress.   HENT:   Head: Normocephalic and atraumatic.   Neck: No JVD present.   Cardiovascular: Normal rate and regular rhythm.    Pulmonary/Chest:   On trach collar   Abdominal: Soft. She exhibits no distension. There is no rebound and no guarding.   Incision c/d/i   MADHURI drain with ss output  G-tube   Genitourinary:   Genitourinary Comments: Fontenot draining clear yellow  Left neph tube with clear yellow urine   Neurological: She is alert.   Skin: Skin is warm and dry. She is not diaphoretic. No pallor.       Significant Labs:    BMP:  Recent Labs   Lab 05/03/19  0243 05/04/19  0438 05/04/19  1125 05/05/19  0412    141  --  139   K 3.7 3.1* 3.2* 3.6   CL 98 99  --  99   CO2 29 31*  --  31*   BUN 23* 17  --  15   CREATININE 0.8 0.7  --  0.8   CALCIUM 9.1 9.2  --  8.5*       CBC:   Recent Labs   Lab 05/04/19  0438 05/05/19  0412 05/05/19  0825   WBC 11.78  11.78 9.87 11.04   HGB 8.3*   8.3* 7.2* 7.8*   HCT 26.9*  26.9* 23.6* 24.6*   *  529* 524* 599*       All pertinent labs results from the past 24 hours have been reviewed.    Significant Imaging:  All pertinent imaging results/findings from the past 24 hours have been reviewed.

## 2019-05-05 NOTE — PROGRESS NOTES
"[facility]  Endocrinology  Progress Note    Admit Date: 4/20/2019     Reason for Consult: Management of T2DM, Hyperglycemia     Surgical Procedure and Date:       04/21/2019:         1. Exploratory laparotomy        2. Ligation of left ureter        3. Removal of left JJ ureteral stent      Diabetes diagnosis year: ANDRE    Home Diabetes Medications:  ANDRE  Toujeo 50 BID  Invokana 300mg daily   Novolog 35 units AM, 45 units PM, 35 units PM    How often checking glucose at home? ANDRE   BG readings on regimen: ANDRE  Hypoglycemia on the regimen?  ANDRE  Missed doses on regimen?  ANDRE    Diabetes Complications include:     Hyperglycemia and Diabetic retinopathy     Complicating diabetes co morbidities:   History of MI and MURTAZA, CAD, HLD    HPI:   Patient is a 59 y.o. female with a diagnosis of HTN, HLN, DM type 2, nonproliferative diabetic renopathy, CAD, NSTEMI, and back pain who presents to the ED with a complaint of altered mental status on 04/20/2019. Is now s/p Exploratory laparotomy, Ligation of left ureter, and Removal of left JJ ureteral stent. Endocrinology consulted to manage DM2/Hyperglycemia.    Lab Results   Component Value Date    HGBA1C 10.8 (H) 02/19/2019       Interval HPI:   Overnight events:  BG is within, or slightly above goal on current IV insulin infusion. NAEON.     Eating:   NPO  Nausea: No  Hypoglycemia and intervention: No  Fever: No  TPN and/or TF: No  If yes, type of TF/TPN and rate: none    BP (!) 130/58 (BP Location: Right leg, Patient Position: Lying)   Pulse 109   Temp 100 °F (37.8 °C) (Oral)   Resp (!) 25   Ht 5' 4" (1.626 m)   Wt 79.5 kg (175 lb 4.3 oz)   SpO2 97%   Breastfeeding? No   BMI 30.08 kg/m²      Labs Reviewed and Include    Recent Labs   Lab 05/05/19  0412   *   CALCIUM 8.5*   ALBUMIN 1.7*   PROT 5.7*      K 3.6   CO2 31*   CL 99   BUN 15   CREATININE 0.8   ALKPHOS 107   ALT 19   AST 16   BILITOT 1.1*     Lab Results   Component Value Date    " WBC 11.04 05/05/2019    HGB 7.8 (L) 05/05/2019    HCT 24.6 (L) 05/05/2019    MCV 85 05/05/2019     (H) 05/05/2019     No results for input(s): TSH, FREET4 in the last 168 hours.  Lab Results   Component Value Date    HGBA1C 10.8 (H) 02/19/2019       Nutritional status:   Body mass index is 30.08 kg/m².  Lab Results   Component Value Date    ALBUMIN 1.7 (L) 05/05/2019    ALBUMIN 1.7 (L) 05/04/2019    ALBUMIN 1.7 (L) 05/03/2019     No results found for: PREALBUMIN    Estimated Creatinine Clearance: 78.2 mL/min (based on SCr of 0.8 mg/dL).    Accu-Checks  Recent Labs     05/03/19  1157 05/03/19  1719 05/03/19  2102 05/04/19  0204 05/04/19  0824 05/04/19  1139 05/04/19  1743 05/04/19  1930 05/04/19  2300 05/05/19  0419   POCTGLUCOSE 172* 150* 147* 138* 157* 200* 173* 175* 189* 193*       Current Medications and/or Treatments Impacting Glycemic Control  Immunotherapy:    Immunosuppressants     None        Steroids:   Hormones (From admission, onward)    None        Pressors:    Autonomic Drugs (From admission, onward)    None        Hyperglycemia/Diabetes Medications:   Antihyperglycemics (From admission, onward)    Start     Stop Route Frequency Ordered    05/05/19 0924  insulin aspart U-100 pen 0-5 Units      -- SubQ Before meals & nightly PRN 05/05/19 0825    05/03/19 0945  insulin regular (Humulin R) 100 Units in sodium chloride 0.9% 100 mL infusion      -- IV Continuous 05/03/19 0836    05/03/19 0935  insulin aspart U-100 pen 0-5 Units      -- SubQ As needed (PRN) 05/03/19 0836          ASSESSMENT and PLAN    * Ureteral transection of left native kidney  Managed per primary team  Avoid hypoglycemia        Type 2 diabetes mellitus with diabetic peripheral angiopathy without gangrene  BG goal 140 - 180    BG is within or below goal on tansition IV insulin Infusion.     continue transition IV insulin infusion with stepdown parameters. Initial rate starting at 1.5 (BG is reasonably controlled while on TF. TF are  now at goal).  Low Dose SQ Insulin Correction Scale.  BG monitoring every 4 hours while on TF.        ** Please notify Endocrine for any change and/or advance in diet/TF**  ** Please call Endocrine for any BG related issues **    Discharge Recommendations:     TBD.             CAD (coronary artery disease)    Managed per primary team  Condition may cause insulin resistance       PAPA (acute kidney injury)  Caution with insulin stacking        HLD (hyperlipidemia)  Managed per primary team  Condition may cause insulin resistance         Sleep apnea  May affect BG readings if uncontrolled        On enteral nutrition  May cause BG excursions.           Uriah Holder, NP  Endocrinology  [facility]

## 2019-05-05 NOTE — PLAN OF CARE
Problem: Adult Inpatient Plan of Care  Goal: Plan of Care Review  Outcome: Ongoing (interventions implemented as appropriate)  Pt alert, follows commands, and nods head appropriately. On trach collar throughout shift at 5 L and 28% FiO2, O2 sats >95%. -110s. MAP maintained >65 and SBP maintained <170. Heparin @ 24 U/kg/hr, Insulin @ 1.5 U/hr. Accuchecks changed to Q4H. TF @ 20 cc/hr, goal set to 40 cc/hr. 300 cc water bolus given Q6H. MADHURI drain ~13 cc serous output. Nephrostomy bag ~600 cc yellow output. Urine output 10-35 cc/hr, responded appropriately to Lasix given at beginning of shift. Team notified of UO 10-15 cc/hr, 250 cc albumin ordered and given. 6 liquid BM throughout shift, no blood noted. PT, OT, and SLP consults placed. Pt and spouse updated on plan of care throughout shift. See flow sheet for assessment data.

## 2019-05-05 NOTE — PROGRESS NOTES
Pharmacokinetic Assessment Follow Up: IV Vancomycin    Vancomycin serum concentration assessment(s):    Vanc Trough this morning drawn at 0825 = 17.8  Concentration is therapeutic (technically drawn prior to 3rd dose since pm dose on 5/3 was held by rn). Would cont with current 1000mg Q12H regimen for now      Vancomycin Regimen Plan:    - Patient received a loading dose of 2000 mg once followed by a maintenance dose of vancomycin 1250mg IV every 12 hours.  - Goal serum trough concentration is 15 to 20 mg/dl (clarified with ID PharmD)  - Vancomycin level on 5/2 at 08:00 was 23.3 mg/dl.  This level was drawn approximately 10.5 hours after the previous dose.    - Dose was then decreased to 1000 mg every 12 hours  - Please continue vancomycin 1000 mg every 12 hr   - Next vanc trough currently timed for 5/7 0800      Pharmacy will continue to follow and monitor vancomycin.    Please contact pharmacy at extension [phone] for questions regarding this assessment.    Thank you for the consult,   Elena Hurtado     Patient brief summary:  Mariann Huff is a 59 y.o. female initiated on antimicrobial therapy with IV Vancomycin for treatment of suspected bone/joint    Drug Allergies:   Review of patient's allergies indicates:  No Known Allergies    Actual Body Weight:   79.5kg    Renal Function:   Estimated Creatinine Clearance: 78.2 mL/min (based on SCr of 0.8 mg/dL).,     Dialysis Method (if applicable):  Not applicable    CBC (last 72 hours):  Recent Labs   Lab Result Units 05/03/19  0243 05/03/19  1711 05/03/19  2211 05/04/19  0438 05/05/19  0412 05/05/19  0825   WBC K/uL 11.57 12.02 12.22 11.78  11.78 9.87 11.04   Hemoglobin g/dL 8.3* 8.6* 8.1* 8.3*  8.3* 7.2* 7.8*   Hematocrit % 26.9* 27.1* 25.4* 26.9*  26.9* 23.6* 24.6*   Platelets K/uL SEE COMMENT 498* 492* 529*  529* 524* 599*   Gran% % 75.7* 75.0* 73.9* 73.3*  73.3* 69.3 69.4   Lymph% % 11.3* 10.4* 10.3* 10.7*  10.7* 13.6* 14.9*   Mono% % 10.8 10.5 11.8 11.7   11.7 11.3 11.1   Eosinophil% % 1.3 2.7 2.9 3.0  3.0 4.1 3.5   Basophil% % 0.3 0.7 0.5 0.8  0.8 0.8 0.5   Differential Method  Automated Automated Automated Automated  Automated Automated Automated       Metabolic Panel (last 72 hours):  Recent Labs   Lab Result Units 05/03/19  0243 05/04/19  0438 05/04/19  1125 05/05/19  0412   Sodium mmol/L 139 141  --  139   Potassium mmol/L 3.7 3.1* 3.2* 3.6   Chloride mmol/L 98 99  --  99   CO2 mmol/L 29 31*  --  31*   Glucose mg/dL 155* 125*  --  165*   BUN, Bld mg/dL 23* 17  --  15   Creatinine mg/dL 0.8 0.7  --  0.8   Albumin g/dL 1.7* 1.7*  --  1.7*   Total Bilirubin mg/dL 1.6* 1.8*  --  1.1*   Alkaline Phosphatase U/L 160* 143*  --  107   AST U/L 30 23  --  16   ALT U/L 33 26  --  19   Magnesium mg/dL 1.7 1.9  --  1.5*   Phosphorus mg/dL 2.9 2.1*  --  2.6*       Vancomycin Administrations:  vancomycin given in the last 96 hours                   vancomycin in dextrose 5 % 1 gram/250 mL IVPB 1,000 mg (mg) 1,000 mg New Bag 05/05/19 0937     1,000 mg New Bag 05/04/19 1954     1,000 mg New Bag  0844     1,000 mg New Bag 05/03/19 0834     1,000 mg New Bag 05/02/19 2129                Drug levels (last 3 results):  Recent Labs   Lab Result Units 05/05/19  0825   Vancomycin-Trough ug/mL 17.8       Microbiologic Results:  Microbiology Results (last 7 days)     Procedure Component Value Units Date/Time    Clostridium difficile EIA [[id number]] Collected:  05/05/19 1132    Order Status:  Sent Specimen:  Stool Updated:  05/05/19 1132    Blood culture [[id number]] Collected:  04/30/19 0230    Order Status:  Completed Specimen:  Blood from Peripheral, Hand, Right Updated:  05/05/19 0612     Blood Culture, Routine No growth after 5 days.    Blood culture [[id number]] Collected:  04/30/19 0247    Order Status:  Completed Specimen:  Blood from Peripheral, Wrist, Right Updated:  05/05/19 0612     Blood Culture, Routine No growth after 5 days.    Culture, VAP (BAL) [[id number]] Collected:   04/30/19 0900    Order Status:  Completed Specimen:  Bronchial Alveolar Lavage from BAL, RUL Updated:  05/02/19 1342     VAP BAL CULTURE No growth     Gram Stain (Respiratory) <10 epithelial cells per low power field.     Gram Stain (Respiratory) No WBC's     Gram Stain (Respiratory) No organisms seen    Clostridium difficile EIA [[id number]] Collected:  05/01/19 1230    Order Status:  Completed Specimen:  Stool Updated:  05/01/19 2110     C. diff Antigen Negative     C difficile Toxins A+B, EIA Negative     Comment: Testing not recommended for children <24 months old.       Blood culture [[id number]] Collected:  04/24/19 0950    Order Status:  Completed Specimen:  Blood from Peripheral, Wrist, Right Updated:  04/29/19 1412     Blood Culture, Routine No growth after 5 days.    Aerobic culture [[id number]]  (Susceptibility) Collected:  04/21/19 0125    Order Status:  Completed Specimen:  Body Fluid from Abdomen Updated:  04/29/19 0951     Aerobic Bacterial Culture --     STAPHYLOCOCCUS LUGDUNENSIS  Rare      Narrative:       Retroperitoneal fluid

## 2019-05-05 NOTE — CARE UPDATE
Patient had one more episode of BRBPR with bowel movement. A CHRIS was performed, no hemorhoids were palpated. The patient was discussed with colorectal surgery who recommends quick clot as this is likely secondary to the rectal tube she'd had in place. Will continue to follow. Her CBC is stable. Will recheck later in the afternoon. This was discussed with urology. All questions were answered to patient's satisfaction.

## 2019-05-06 PROBLEM — L30.8 DERMATITIS ASSOCIATED WITH MOISTURE: Status: ACTIVE | Noted: 2019-05-06

## 2019-05-06 LAB
ABO + RH BLD: NORMAL
ALBUMIN SERPL BCP-MCNC: 1.7 G/DL (ref 3.5–5.2)
ALP SERPL-CCNC: 100 U/L (ref 55–135)
ALT SERPL W/O P-5'-P-CCNC: 17 U/L (ref 10–44)
ANION GAP SERPL CALC-SCNC: 7 MMOL/L (ref 8–16)
APTT BLDCRRT: 26.7 SEC (ref 21–32)
AST SERPL-CCNC: 19 U/L (ref 10–40)
BACTERIA SPEC AEROBE CULT: NORMAL
BASOPHILS # BLD AUTO: 0.04 K/UL (ref 0–0.2)
BASOPHILS # BLD AUTO: 0.08 K/UL (ref 0–0.2)
BASOPHILS # BLD AUTO: 0.09 K/UL (ref 0–0.2)
BASOPHILS NFR BLD: 0.4 % (ref 0–1.9)
BASOPHILS NFR BLD: 0.5 % (ref 0–1.9)
BASOPHILS NFR BLD: 0.6 % (ref 0–1.9)
BILIRUB SERPL-MCNC: 1.1 MG/DL (ref 0.1–1)
BLD GP AB SCN CELLS X3 SERPL QL: NORMAL
BLD PROD TYP BPU: NORMAL
BLOOD UNIT EXPIRATION DATE: NORMAL
BLOOD UNIT TYPE CODE: 5100
BLOOD UNIT TYPE: NORMAL
BUN SERPL-MCNC: 22 MG/DL (ref 6–20)
CALCIUM SERPL-MCNC: 8.2 MG/DL (ref 8.7–10.5)
CHLORIDE SERPL-SCNC: 100 MMOL/L (ref 95–110)
CO2 SERPL-SCNC: 31 MMOL/L (ref 23–29)
CODING SYSTEM: NORMAL
CREAT SERPL-MCNC: 0.8 MG/DL (ref 0.5–1.4)
DIFFERENTIAL METHOD: ABNORMAL
DISPENSE STATUS: NORMAL
EOSINOPHIL # BLD AUTO: 0.5 K/UL (ref 0–0.5)
EOSINOPHIL # BLD AUTO: 0.5 K/UL (ref 0–0.5)
EOSINOPHIL # BLD AUTO: 0.6 K/UL (ref 0–0.5)
EOSINOPHIL NFR BLD: 2.6 % (ref 0–8)
EOSINOPHIL NFR BLD: 4.1 % (ref 0–8)
EOSINOPHIL NFR BLD: 4.4 % (ref 0–8)
ERYTHROCYTE [DISTWIDTH] IN BLOOD BY AUTOMATED COUNT: 13.2 % (ref 11.5–14.5)
ERYTHROCYTE [DISTWIDTH] IN BLOOD BY AUTOMATED COUNT: 13.3 % (ref 11.5–14.5)
ERYTHROCYTE [DISTWIDTH] IN BLOOD BY AUTOMATED COUNT: 13.5 % (ref 11.5–14.5)
EST. GFR  (AFRICAN AMERICAN): >60 ML/MIN/1.73 M^2
EST. GFR  (NON AFRICAN AMERICAN): >60 ML/MIN/1.73 M^2
GLUCOSE SERPL-MCNC: 167 MG/DL (ref 70–110)
HCT VFR BLD AUTO: 22.4 % (ref 37–48.5)
HCT VFR BLD AUTO: 23.5 % (ref 37–48.5)
HCT VFR BLD AUTO: 24.8 % (ref 37–48.5)
HGB BLD-MCNC: 6.7 G/DL (ref 12–16)
HGB BLD-MCNC: 7.4 G/DL (ref 12–16)
HGB BLD-MCNC: 7.7 G/DL (ref 12–16)
IMM GRANULOCYTES # BLD AUTO: 0.09 K/UL (ref 0–0.04)
IMM GRANULOCYTES # BLD AUTO: 0.14 K/UL (ref 0–0.04)
IMM GRANULOCYTES # BLD AUTO: 0.15 K/UL (ref 0–0.04)
IMM GRANULOCYTES NFR BLD AUTO: 0.7 % (ref 0–0.5)
IMM GRANULOCYTES NFR BLD AUTO: 0.8 % (ref 0–0.5)
IMM GRANULOCYTES NFR BLD AUTO: 1.1 % (ref 0–0.5)
INR PPP: 1 (ref 0.8–1.2)
LYMPHOCYTES # BLD AUTO: 1.5 K/UL (ref 1–4.8)
LYMPHOCYTES # BLD AUTO: 1.6 K/UL (ref 1–4.8)
LYMPHOCYTES # BLD AUTO: 2 K/UL (ref 1–4.8)
LYMPHOCYTES NFR BLD: 13.7 % (ref 18–48)
LYMPHOCYTES NFR BLD: 14.6 % (ref 18–48)
LYMPHOCYTES NFR BLD: 8 % (ref 18–48)
MAGNESIUM SERPL-MCNC: 2.1 MG/DL (ref 1.6–2.6)
MCH RBC QN AUTO: 26.8 PG (ref 27–31)
MCH RBC QN AUTO: 27.4 PG (ref 27–31)
MCH RBC QN AUTO: 27.9 PG (ref 27–31)
MCHC RBC AUTO-ENTMCNC: 29.9 G/DL (ref 32–36)
MCHC RBC AUTO-ENTMCNC: 31 G/DL (ref 32–36)
MCHC RBC AUTO-ENTMCNC: 31.5 G/DL (ref 32–36)
MCV RBC AUTO: 88 FL (ref 82–98)
MCV RBC AUTO: 89 FL (ref 82–98)
MCV RBC AUTO: 90 FL (ref 82–98)
MONOCYTES # BLD AUTO: 1 K/UL (ref 0.3–1)
MONOCYTES # BLD AUTO: 1.4 K/UL (ref 0.3–1)
MONOCYTES # BLD AUTO: 1.5 K/UL (ref 0.3–1)
MONOCYTES NFR BLD: 10.3 % (ref 4–15)
MONOCYTES NFR BLD: 7.7 % (ref 4–15)
MONOCYTES NFR BLD: 9.4 % (ref 4–15)
NEUTROPHILS # BLD AUTO: 16.1 K/UL (ref 1.8–7.7)
NEUTROPHILS # BLD AUTO: 7.8 K/UL (ref 1.8–7.7)
NEUTROPHILS # BLD AUTO: 9.3 K/UL (ref 1.8–7.7)
NEUTROPHILS NFR BLD: 69.3 % (ref 38–73)
NEUTROPHILS NFR BLD: 71.3 % (ref 38–73)
NEUTROPHILS NFR BLD: 80.5 % (ref 38–73)
NRBC BLD-RTO: 0 /100 WBC
PHOSPHATE SERPL-MCNC: 2.4 MG/DL (ref 2.7–4.5)
PLATELET # BLD AUTO: 575 K/UL (ref 150–350)
PLATELET # BLD AUTO: 595 K/UL (ref 150–350)
PLATELET # BLD AUTO: 624 K/UL (ref 150–350)
PMV BLD AUTO: 10.5 FL (ref 9.2–12.9)
PMV BLD AUTO: 10.6 FL (ref 9.2–12.9)
PMV BLD AUTO: 11.4 FL (ref 9.2–12.9)
POCT GLUCOSE: 154 MG/DL (ref 70–110)
POCT GLUCOSE: 176 MG/DL (ref 70–110)
POCT GLUCOSE: 191 MG/DL (ref 70–110)
POCT GLUCOSE: 203 MG/DL (ref 70–110)
POCT GLUCOSE: 208 MG/DL (ref 70–110)
POCT GLUCOSE: 229 MG/DL (ref 70–110)
POTASSIUM SERPL-SCNC: 3.9 MMOL/L (ref 3.5–5.1)
PROT SERPL-MCNC: 6 G/DL (ref 6–8.4)
PROTHROMBIN TIME: 10.4 SEC (ref 9–12.5)
RBC # BLD AUTO: 2.5 M/UL (ref 4–5.4)
RBC # BLD AUTO: 2.65 M/UL (ref 4–5.4)
RBC # BLD AUTO: 2.81 M/UL (ref 4–5.4)
SODIUM SERPL-SCNC: 138 MMOL/L (ref 136–145)
TRANS ERYTHROCYTES VOL PATIENT: NORMAL ML
WBC # BLD AUTO: 10.89 K/UL (ref 3.9–12.7)
WBC # BLD AUTO: 13.36 K/UL (ref 3.9–12.7)
WBC # BLD AUTO: 20 K/UL (ref 3.9–12.7)

## 2019-05-06 PROCEDURE — 92609 USE OF SPEECH DEVICE SERVICE: CPT

## 2019-05-06 PROCEDURE — 99900035 HC TECH TIME PER 15 MIN (STAT)

## 2019-05-06 PROCEDURE — 63600175 PHARM REV CODE 636 W HCPCS: Performed by: PHYSICIAN ASSISTANT

## 2019-05-06 PROCEDURE — 80053 COMPREHEN METABOLIC PANEL: CPT

## 2019-05-06 PROCEDURE — 85730 THROMBOPLASTIN TIME PARTIAL: CPT

## 2019-05-06 PROCEDURE — 25000003 PHARM REV CODE 250: Performed by: PHYSICIAN ASSISTANT

## 2019-05-06 PROCEDURE — 99233 PR SUBSEQUENT HOSPITAL CARE,LEVL III: ICD-10-PCS | Mod: ,,, | Performed by: INTERNAL MEDICINE

## 2019-05-06 PROCEDURE — 84100 ASSAY OF PHOSPHORUS: CPT

## 2019-05-06 PROCEDURE — 86920 COMPATIBILITY TEST SPIN: CPT

## 2019-05-06 PROCEDURE — 36430 TRANSFUSION BLD/BLD COMPNT: CPT

## 2019-05-06 PROCEDURE — 25000003 PHARM REV CODE 250: Performed by: UROLOGY

## 2019-05-06 PROCEDURE — 99232 SBSQ HOSP IP/OBS MODERATE 35: CPT | Mod: ,,, | Performed by: ANESTHESIOLOGY

## 2019-05-06 PROCEDURE — 99232 SBSQ HOSP IP/OBS MODERATE 35: CPT | Mod: ,,, | Performed by: NURSE PRACTITIONER

## 2019-05-06 PROCEDURE — 97165 OT EVAL LOW COMPLEX 30 MIN: CPT

## 2019-05-06 PROCEDURE — P9021 RED BLOOD CELLS UNIT: HCPCS

## 2019-05-06 PROCEDURE — 99232 PR SUBSEQUENT HOSPITAL CARE,LEVL II: ICD-10-PCS | Mod: ,,, | Performed by: NURSE PRACTITIONER

## 2019-05-06 PROCEDURE — 63600175 PHARM REV CODE 636 W HCPCS: Performed by: UROLOGY

## 2019-05-06 PROCEDURE — 97162 PT EVAL MOD COMPLEX 30 MIN: CPT

## 2019-05-06 PROCEDURE — 99900026 HC AIRWAY MAINTENANCE (STAT)

## 2019-05-06 PROCEDURE — 97116 GAIT TRAINING THERAPY: CPT

## 2019-05-06 PROCEDURE — 20000000 HC ICU ROOM

## 2019-05-06 PROCEDURE — 27000221 HC OXYGEN, UP TO 24 HOURS

## 2019-05-06 PROCEDURE — 99233 SBSQ HOSP IP/OBS HIGH 50: CPT | Mod: ,,, | Performed by: INTERNAL MEDICINE

## 2019-05-06 PROCEDURE — 94761 N-INVAS EAR/PLS OXIMETRY MLT: CPT

## 2019-05-06 PROCEDURE — 25000003 PHARM REV CODE 250: Performed by: ANESTHESIOLOGY

## 2019-05-06 PROCEDURE — 85025 COMPLETE CBC W/AUTO DIFF WBC: CPT | Mod: 91

## 2019-05-06 PROCEDURE — 63600175 PHARM REV CODE 636 W HCPCS: Performed by: STUDENT IN AN ORGANIZED HEALTH CARE EDUCATION/TRAINING PROGRAM

## 2019-05-06 PROCEDURE — 86850 RBC ANTIBODY SCREEN: CPT

## 2019-05-06 PROCEDURE — 63600175 PHARM REV CODE 636 W HCPCS: Performed by: ANESTHESIOLOGY

## 2019-05-06 PROCEDURE — C9113 INJ PANTOPRAZOLE SODIUM, VIA: HCPCS | Performed by: STUDENT IN AN ORGANIZED HEALTH CARE EDUCATION/TRAINING PROGRAM

## 2019-05-06 PROCEDURE — 83735 ASSAY OF MAGNESIUM: CPT

## 2019-05-06 PROCEDURE — 85610 PROTHROMBIN TIME: CPT

## 2019-05-06 PROCEDURE — 99232 PR SUBSEQUENT HOSPITAL CARE,LEVL II: ICD-10-PCS | Mod: ,,, | Performed by: ANESTHESIOLOGY

## 2019-05-06 RX ORDER — HYDROCODONE BITARTRATE AND ACETAMINOPHEN 500; 5 MG/1; MG/1
TABLET ORAL
Status: DISCONTINUED | OUTPATIENT
Start: 2019-05-06 | End: 2019-05-07

## 2019-05-06 RX ORDER — HYDROMORPHONE HYDROCHLORIDE 1 MG/ML
1 INJECTION, SOLUTION INTRAMUSCULAR; INTRAVENOUS; SUBCUTANEOUS EVERY 4 HOURS PRN
Status: DISCONTINUED | OUTPATIENT
Start: 2019-05-06 | End: 2019-05-07

## 2019-05-06 RX ADMIN — VANCOMYCIN HYDROCHLORIDE 1000 MG: 1 INJECTION, POWDER, LYOPHILIZED, FOR SOLUTION INTRAVENOUS at 09:05

## 2019-05-06 RX ADMIN — PANTOPRAZOLE SODIUM 40 MG: 40 INJECTION, POWDER, LYOPHILIZED, FOR SOLUTION INTRAVENOUS at 08:05

## 2019-05-06 RX ADMIN — HYDROMORPHONE HYDROCHLORIDE 1 MG: 1 INJECTION, SOLUTION INTRAMUSCULAR; INTRAVENOUS; SUBCUTANEOUS at 10:05

## 2019-05-06 RX ADMIN — POTASSIUM CHLORIDE 40 MEQ: 20 SOLUTION ORAL at 05:05

## 2019-05-06 RX ADMIN — INSULIN ASPART 1 UNITS: 100 INJECTION, SOLUTION INTRAVENOUS; SUBCUTANEOUS at 04:05

## 2019-05-06 RX ADMIN — CEFTRIAXONE 2 G: 2 INJECTION, SOLUTION INTRAVENOUS at 04:05

## 2019-05-06 RX ADMIN — FENTANYL CITRATE 50 MCG: 50 INJECTION INTRAMUSCULAR; INTRAVENOUS at 05:05

## 2019-05-06 RX ADMIN — INSULIN ASPART 2 UNITS: 100 INJECTION, SOLUTION INTRAVENOUS; SUBCUTANEOUS at 12:05

## 2019-05-06 RX ADMIN — NYSTATIN AND TRIAMCINOLONE ACETONIDE: 100000; 1 CREAM TOPICAL at 08:05

## 2019-05-06 RX ADMIN — NYSTATIN AND TRIAMCINOLONE ACETONIDE: 100000; 1 CREAM TOPICAL at 09:05

## 2019-05-06 RX ADMIN — INSULIN ASPART 2 UNITS: 100 INJECTION, SOLUTION INTRAVENOUS; SUBCUTANEOUS at 04:05

## 2019-05-06 RX ADMIN — PANTOPRAZOLE SODIUM 40 MG: 40 INJECTION, POWDER, LYOPHILIZED, FOR SOLUTION INTRAVENOUS at 09:05

## 2019-05-06 RX ADMIN — FENTANYL CITRATE 50 MCG: 50 INJECTION INTRAMUSCULAR; INTRAVENOUS at 01:05

## 2019-05-06 RX ADMIN — RIFAMPIN 300 MG: 600 INJECTION, POWDER, LYOPHILIZED, FOR SOLUTION INTRAVENOUS at 02:05

## 2019-05-06 RX ADMIN — CHLORHEXIDINE GLUCONATE 0.12% ORAL RINSE 15 ML: 1.2 LIQUID ORAL at 08:05

## 2019-05-06 RX ADMIN — CHLORHEXIDINE GLUCONATE 0.12% ORAL RINSE 15 ML: 1.2 LIQUID ORAL at 09:05

## 2019-05-06 RX ADMIN — INSULIN ASPART 2 UNITS: 100 INJECTION, SOLUTION INTRAVENOUS; SUBCUTANEOUS at 11:05

## 2019-05-06 NOTE — PT/OT/SLP PROGRESS
Speech Language Pathology Treatment    Patient Name:  Mariann Huff   MRN:  [id number]  Admitting Diagnosis: Ureteral transection of left native kidney    Recommendations:                 General Recommendations:  One Way Speaking Valve training; when medically appropriate, recommend MD team consider trach downsize to # 6 to assist with success of PMSV use.   General Precautions: Standard, respiratory  Communication strategies:  Pt successfully communicating via mouthing, gesture per pt/nursing  Given reduced valve tolerance during trials with ST, PMSV should only be attempted under 1:1 trained supervision at this time.      Subjective      Pt seen this am with no family present.    Pain/Comfort:  · Pain Rating 1: 0/10  · Pain Rating 2: 0/10    Objective:     Has the patient been evaluated by SLP for swallowing?   No  Keep patient NPO?     Current Respiratory Status: trach with cuff deflated      Pt alert and cooperative.  Mrs. Huff remains with size 8 trach/cuff deflated.  No significant secretions evident.  Pt reports limited need for suction when asked.  Heart rate was 101 at start and remained mostly unchanged throughout session. Likewise, SPO2 remained 98-99% throughout session.   PMSV was placed x2 with less than 10 seconds tolerance on each trial.  Pt remains unable to elicit voicing during effort when valve in place.  On both trials, pt gestured for removal with pt perceived difficulty breathing.  Back-pressure was noted with valve removal.   Some anxiety appeared evident with attempting second trial.  Education and encouragement provided by ST.   Results reviewed with nursing post session.     Assessment:     Mariann Huff is a 59 y.o. female with an SLP diagnosis of One Way Speaking Valve Training.     Goals:   Multidisciplinary Problems     SLP Goals        Problem: SLP Goal    Goal Priority Disciplines Outcome   SLP Goal     SLP    Description:  Speech Language Pathology Goals  Goals expected to be  met by 5/11  1.  Pt/family education regarding PMSV use/benefits/precautions.  2.  Pt will tolerate PMSV for 5 minutes with no s/s distress.                        Plan:     · Patient to be seen:  3 x/week   · Plan of Care reviewed with:  patient   · SLP Follow-Up:  Yes       Discharge recommendations:  (TBD)       Time Tracking:     SLP Treatment Date:   05/06/19  Speech Start Time:  0917  Speech Stop Time:  0925     Speech Total Time (min):  8 min    Billable Minutes: Evaluation Use/Fit Voiced Prosthetic 8    MAURICE Cortez, CCC-SLP  Speech Language Pathologist  [phone]  5/6/2019

## 2019-05-06 NOTE — PT/OT/SLP EVAL
Occupational Therapy   Evaluation    Name: Mariann Huff  MRN: [id number]  Admitting Diagnosis:  Ureteral transection of left native kidney 4 Days Post-Op    Recommendations:     Discharge Recommendations: rehabilitation facility  Discharge Equipment Recommendations:  (TBD)  Barriers to discharge:  None    Assessment:     Mariann Huff is a 59 y.o. female with a medical diagnosis of Ureteral transection of left native kidney.  She presents with weakness and spinal precautions impairing mobility and self care. Pt able to sit EOB, stand, and take side steps with assist using RW. Performance deficits affecting function: weakness, impaired endurance, impaired balance, gait instability, orthopedic precautions, impaired self care skills, impaired functional mobilty, pain, impaired cardiopulmonary response to activity, decreased lower extremity function.      Rehab Prognosis: Fair; patient would benefit from acute skilled OT services to address these deficits and reach maximum level of function.       Plan:     Patient to be seen 4 x/week to address the above listed problems via self-care/home management, therapeutic activities, therapeutic exercises  · Plan of Care Expires: 06/05/19  · Plan of Care Reviewed with: patient, spouse    Subjective     Chief Complaint: back pain  Patient/Family Comments/goals: return to PLOF    Occupational Profile:  Living Environment: Pt lives with her  in 1SH with 0STE. Home has walk-in shower with built-in bench  Previous level of function:  reports pt had back sx on 4/12/19 and was using RW for ambulation since 4/15/19. Prior to back sx, pt was independent with all ADL and IADL; she was not using any AD for ambulation or ADL. Pt has an LSO brace since back sx to use when OOB.   Roles and Routines: wife  Equipment Used at Home:  walker, rolling(built-in shower bench)  Assistance upon Discharge:  to assist following d/c    Pain/Comfort:  · Pain Rating 1:  5/10  · Location - Side 1: Bilateral  · Location - Orientation 1: generalized  · Location 1: back  · Pain Addressed 1: Reposition, Distraction, Cessation of Activity  · Pain Rating Post-Intervention 1: 5/10    Patients cultural, spiritual, Jew conflicts given the current situation: no    Objective:     Communicated with: RN prior to session.  Patient found HOB elevated with blood pressure cuff, MADHURI drain, correa catheter, oxygen, PEG Tube, telemetry, tracheostomy, pulse ox (continuous)(t-tube) upon OT entry to room.    General Precautions: Standard, fall   Orthopedic Precautions:N/A   Braces: LSO     Occupational Performance:    Bed Mobility:    · Patient completed Rolling/Turning to Left with  maximal assistance  · Patient completed Rolling/Turning to Right with maximal assistance  · Patient completed Scooting/Bridging with minimum assistance  · Patient completed Supine to Sit with maximal assistance  · Patient completed Sit to Supine with maximal assistance    Functional Mobility/Transfers:  · Patient completed Sit <> Stand Transfer with maximal assistance  with  rolling walker   · Functional Mobility: Pt took 3 steps to R with mod A using RW    Activities of Daily Living:  · Grooming: setup assistance to wash face while seated EOB  · Lower Body Dressing: total assistance to don B socks while supine    Cognitive/Visual Perceptual:  Cognitive/Psychosocial Skills:     -       Oriented to: Person, Place, Time and Situation   -       Follows Commands/attention:Follows multistep  commands  -       Communication: trach  -       Memory: No Deficits noted  -       Safety awareness/insight to disability: intact   -       Mood/Affect/Coping skills/emotional control: Appropriate to situation  Visual/Perceptual:      -Intact vision/hearing    Physical Exam:  Balance:    -       fair sitting balance; poor standing balance  Postural examination/scapula alignment:    -       Rounded shoulders  -       Forward head  Skin  integrity: Visible skin intact  Edema:  None noted  Sensation:    -       Intact  Dominant hand:    -       R hand  Upper Extremity Range of Motion:     -       Right Upper Extremity: WFL  -       Left Upper Extremity: WFL  Upper Extremity Strength:    -       Right Upper Extremity: WFL  -       Left Upper Extremity: WFL   Strength:    -       Right Upper Extremity: WFL  -       Left Upper Extremity: WFL  Fine Motor Coordination:    -       Intact  Gross motor coordination:   WFL    AMPAC 6 Click ADL:  AMPAC Total Score: 13    Treatment & Education:  Pt educated on role of OT/POC  Pt educated on importance of EOB/OOB activity  White board/communication board updated  Education:    Patient left HOB elevated with all lines intact, call button in reach and RN notified    GOALS:   Multidisciplinary Problems     Occupational Therapy Goals        Problem: Occupational Therapy Goal    Goal Priority Disciplines Outcome Interventions   Occupational Therapy Goal     OT, PT/OT Ongoing (interventions implemented as appropriate)    Description:  Goals to be met by: 5/20/19     Patient will increase functional independence with ADLs by performing:    UE Dressing with Set-up Assistance.  LE Dressing with Maximum Assistance and Assistive Devices as needed.  Grooming while standing with Minimal Assistance.  Toileting from bedside commode with Minimal Assistance for hygiene and clothing management.   Sitting at edge of bed x10 minutes with Supervision.  Rolling to Bilateral with Minimal Assistance.   Supine to sit with Minimal Assistance.  Toilet transfer to bedside commode with Minimal Assistance.                      History:     Past Medical History:   Diagnosis Date    Anticoagulant long-term use     Asthma     Back pain     CAD (coronary artery disease)     s/p stentimg 2003 (2),2009 (1)    Carotid artery stenosis     Diabetes mellitus type 2 in obese     HTN (hypertension), benign     Hyperlipidemia     Keloid  cicatrix     NPDR (nonproliferative diabetic retinopathy) 2015    NSTEMI (non-ST elevated myocardial infarction)     Nuclear sclerosis - Right Eye 3/18/2014    Primary localized osteoarthrosis, lower leg 2014    Sleep apnea        Past Surgical History:   Procedure Laterality Date    BRONCHOSCOPY N/A 2019    Performed by Sean Ruano MD at [facility] OR 2ND FLR    CARDIAC CATHETERIZATION      cataract extraction left eye      cataracts      CATHETERIZATION, HEART, LEFT Left 2014    Performed by Wilman Kim MD at [facility] CATH LAB     SECTION, LOW TRANSVERSE      CORONARY ANGIOPLASTY      Creation, Nephrostomy, Percutaneous Left 2019    Performed by Karina Surgeon at [facility] KARINA    CREATION, TRACHEOSTOMY N/A 2019    Performed by Sean Ruano MD at [facility] OR 2ND FLR    EGD, WITH PEG TUBE INSERTION  2019    Performed by Sean Ruano MD at [facility] OR 2ND FLR    ESOPHAGOGASTRODUODENOSCOPY (EGD) N/A 2016    Performed by Gardenia Adamson MD at [facility] ENDO (4TH FLR)    EXCISION TURBINATE, SUBMUCOUS      FUSION, SPINE, LUMBAR, ANTERIOR APPROACH Left L5-S1 Anterior to Psoa Interbody Fusion, L5-S1 Posterior Instrumentation Left 2019    Performed by Mk George MD at [facility] OR 2ND FLR    HAND SURGERY Left     HAND SURGERY Right     torn ligament repair/ Dr. Yeboah    HYSTERECTOMY      Injection,steroid,epidural,transforaminal approach - Bilateral - S1 Bilateral 2018    Performed by Tl Abreu MD at [facility] PAIN MGT    Injection-steroid-epidural-caudal N/A 2019    Performed by Dave Bentley Jr., MD at [facility] PAIN MGT    INSERTION-INTRAOCULAR LENS (IOL) Right 2015    Performed by Good Domingo MD at [facility] OR 1ST FLR    LAPAROTOMY, EXPLORATORY; LIGATION OF LEFT URETER; DOUBLE J STENT REMOVAL LEFT URETER Left 2019    Performed by Paul Carlson MD at [facility] OR 2ND FLR    left foot surgery      left wrist  surgery      NASAL SEPTUM SURGERY  5/7/15    PHACOEMULSIFICATION-ASPIRATION-CATARACT Right 9/1/2015    Performed by [facility]ingo MD at [facility] OR 1ST FLR    REPAIR, URETER  4/12/2019    Performed by Mk George MD at [facility] OR 2ND FLR    RESECTION-TURBINATES (SMR) N/A 5/7/2015    Performed by Dileep Dubois III, MD at [facility] OR 2ND [facility]    rt elbow surgery      S/P LAD COATED STENT  05/14/2010    6 total     S/P OM1 STENT  08/2003    SEPTOPLASTY N/A 5/7/2015    Performed by Dileep Dubois III, MD at [facility] OR 2ND FLR    SINUS SURGERY      F.E.S.S.    SINUS SURGERY FUNCTIONAL ENDOSCOPIC WITH NAVIGATION WITH MAXILLARIES, ETHMOIDS, LEFT SPHENOID, LEFT LOLY N/A 5/7/2015    Performed by Dileep Dubois III, MD at [facility] OR 2ND FLR    STENT, URETERAL placement  4/12/2019    Performed by Robin Boyd MD at [facility] OR 2ND FLR    TUBAL LIGATION         Time Tracking:     OT Date of Treatment: 05/06/19  OT Start Time: 1344  OT Stop Time: 1400  OT Total Time (min): 16 min    Billable Minutes:Evaluation 16    Ana Miller OT  5/6/2019

## 2019-05-06 NOTE — PLAN OF CARE
Problem: Adult Inpatient Plan of Care  Goal: Plan of Care Review  Outcome: Ongoing (interventions implemented as appropriate)  Patient and patient's  updated on plan of care. No acute events during shift. All VSS. Tmax 99.8. HR 's sinu rhythm. SBP <180. 5L 28% on Trach Collar with O2 > 92%. 440ml of urine outpur from left nephrostomy and 260ml or urine output from correa catheter. Multiple bloody bowel movements during shift. Dark red clots and bright red liquid in stool. Dr. Crump placed Quickclot in patient's rectum. Tube feeds remain at goal (40ml/hr) and being tolerated well. PT and OT worked with patient and patient stood on side of the bed. Insulin gtt remains at 1.5 units/hr. All questions and concerns acknowledged and answered. See flow sheet for full assessment details. Will continue to monitor patient closely.

## 2019-05-06 NOTE — PROGRESS NOTES
Wound care consult for sacral area.     PMH:   HTN, type 2 diabetes mellitus, CAD, NSTEMI, and back pain       Patient with multiple bloody stools. Patient excoriated thru the gluteal cleft, perianal and inner thigh appears. The appears to be a combination of moisture and yeast. Patient is able to turn independently. She states the waffle is uncomfortable. Will order for pulsate mattress CALLY to aid is moisture management.     Recommend:  Barrier antifungal cream BID to the perineal area  Q2hour turns  Pulsate mattress    Wound care to follow PRN.  Lanny Mireles RN [facility]   [phone]             05/06/19 1055        Wound 05/03/19 2000 Moisture associated dermatitis lower Buttocks   Date First Assessed/Time First Assessed: 05/03/19 2000   Primary Wound Type: Moisture associated dermatitis  Side: Left  Orientation: lower  Location: Buttocks   Wound Image    Wound WDL ex   Dressing Appearance Open to air   Drainage Amount Scant   Drainage Characteristics/Odor Sanguineous   Appearance Red;Moist   Tissue loss description Partial thickness   Red (%), Wound Tissue Color 100 %   Periwound Area Denuded;Dry   Wound Edges Open

## 2019-05-06 NOTE — ASSESSMENT & PLAN NOTE
60 y/o female with HTN, DMII, CAD, NSTEMI, s/p L5-S1 OLIF with NSGY 4/12 c/b intraoperative left ureteral injury and underwent ureteroureteral anastomoses and ureteral stent placement. She was discharged with a correa and MADHURI drain that was removed on 4/16. She was seen by urology and anticipated stent removal in 2-3 months (6/2019).  She presents to ED with AMS and E.coli septicemia found to have air and fluid collection of left anterior prevertebral soft tissue with left ureter involvement. She underwent ex-lap and washout on 4/21. We are consulted for abx recommendations.      Per op note,  There were pocket of purulence noted and evacuated, sent for culture of left retroperitoneum to pole of left kidney. The stent was visible. Posterior to the stent there was a pocket of purulent fluid and exposed hardware along the spinal vertebrae. The tissue along the distal and proximal end of ureter were friable and unhealthy. She underwent left nephrostomy tube placement. The stent was removed and several metal clips were placed on proximal end of the ureteral.      OR cultures with Staph Lugdenensis - not currently covered.  Patient does appear to be having diarrhea - C diff negative but suspect may need restesting if WBC increases and no other source found.  QTc long at 480      Repeat BCXs sent and NGTD.  BAL done but no WBC no organisms seen - NGTD.  Source of fever and leukocytosis unclear but ABD suspected though could be non infectious - drug reaction vs PE/DVT.  CT abdomen with superficial fluid collection - possible seroma and inflammatory change around ureter.  C diff repeated and negative.  Line changes jonathan cvc - removed.  RUE US shows partially occlusive thrombus in RIJ and proximal subclvian vein - suspected cause of fevers.  CXR clear.      Recommendations:  1. Continue ceftriaxone 2g q24hr and rifampin 300 mg q12hr IV (transition RIF to oral once able) given hardware in place.   2. Rec DC Vancomycin as E coli  and Staph Lugdenensis covered by CTX  3. Staph lugdenensis sensitive to Rifampin  4. May need to change abx out of concern for drug reaction and assess for other non infectious causes of fever.  5. Anticipate 6 weeks of CTX and RIF abx from first negative blood cultures (4/23-6/4/19)  followed by oral abx suppression given retained hardware.   6. Weekly ESR, CRP,CBC, CMP faxed to ID dept at [phone]  7. FU 10-14d post DC  8.  Consider reimaging of abd in future if clinically appropriate given prior CT findings     9. Dicussed with primary team as they were present at time of interview.  10. ID will sign off.

## 2019-05-06 NOTE — PLAN OF CARE
Problem: Physical Therapy Goal  Goal: Physical Therapy Goal  Goals to be met by: 19    Patient will increase functional independence with mobility by performin. Supine to sit with Moderate Assistance -not met  2. Sit to stand transfer with Minimal Assistance -not met  3. Gait  x 30 feet with Contact Guard Assistance using Rolling Walker. -not met  4. Lower extremity exercise program x15 reps , with assistance as needed-not met    Evaluation completed and goals appropriate. Cathy Taylor, PT 2019

## 2019-05-06 NOTE — PROGRESS NOTES
"[facility]  Endocrinology  Progress Note    Admit Date: 4/20/2019     Reason for Consult: Management of T2DM, Hyperglycemia     Surgical Procedure and Date:       04/21/2019:         1. Exploratory laparotomy        2. Ligation of left ureter        3. Removal of left JJ ureteral stent      Diabetes diagnosis year: ANDRE    Home Diabetes Medications:  ANDRE  Toujeo 50 BID  Invokana 300mg daily   Novolog 35 units AM, 45 units PM, 35 units PM    How often checking glucose at home? ANDRE   BG readings on regimen: ANDRE  Hypoglycemia on the regimen?  ANDRE  Missed doses on regimen?  ANDRE    Diabetes Complications include:     Hyperglycemia and Diabetic retinopathy     Complicating diabetes co morbidities:   History of MI and MURTAZA, CAD, HLD    HPI:   Patient is a 59 y.o. female with a diagnosis of HTN, HLN, DM type 2, nonproliferative diabetic renopathy, CAD, NSTEMI, and back pain who presents to the ED with a complaint of altered mental status on 04/20/2019. Is now s/p Exploratory laparotomy, Ligation of left ureter, and Removal of left JJ ureteral stent. Endocrinology consulted to manage DM2/Hyperglycemia.    Lab Results   Component Value Date    HGBA1C 10.8 (H) 02/19/2019       Interval HPI:   Overnight events:   BG is within or slightly above goal on current IV insulin infusion. NAEON.     Eating:   NPO  Nausea: No.  Hypoglycemia and intervention: No.  Fever: No.  TPN and/or TF: Yes (Tube Feeds)  If yes, type of TF/TPN and rate: 40 ml/hr.  (at goal)    BP (P) 132/75 (BP Location: Right arm)   Pulse 104   Temp 99.5 °F (37.5 °C) (Oral)   Resp (!) 22   Ht 5' 4" (1.626 m)   Wt 77.2 kg (170 lb 3.1 oz)   SpO2 98%   Breastfeeding? No   BMI 29.21 kg/m²      Labs Reviewed and Include    Recent Labs   Lab 05/06/19  0409   *   CALCIUM 8.2*   ALBUMIN 1.7*   PROT 6.0      K 3.9   CO2 31*      BUN 22*   CREATININE 0.8   ALKPHOS 100   ALT 17   AST 19   BILITOT 1.1*     Lab Results   Component " Value Date    WBC 20.00 (H) 05/06/2019    HGB 7.7 (L) 05/06/2019    HCT 24.8 (L) 05/06/2019    MCV 88 05/06/2019     (H) 05/06/2019     No results for input(s): TSH, FREET4 in the last 168 hours.  Lab Results   Component Value Date    HGBA1C 10.8 (H) 02/19/2019       Nutritional status:   Body mass index is 29.21 kg/m².  Lab Results   Component Value Date    ALBUMIN 1.7 (L) 05/06/2019    ALBUMIN 1.7 (L) 05/05/2019    ALBUMIN 1.7 (L) 05/04/2019     No results found for: PREALBUMIN    Estimated Creatinine Clearance: 77.1 mL/min (based on SCr of 0.8 mg/dL).    Accu-Checks  Recent Labs     05/05/19  0419 05/05/19  0822 05/05/19  1416 05/05/19  1703 05/05/19  1808 05/05/19  2016 05/06/19  0004 05/06/19  0414 05/06/19  0817 05/06/19  1157   POCTGLUCOSE 193* 176* 296* 164* 194* 162* 229* 208* 191* 203*       Current Medications and/or Treatments Impacting Glycemic Control  Immunotherapy:    Immunosuppressants     None        Steroids:   Hormones (From admission, onward)    None        Pressors:    Autonomic Drugs (From admission, onward)    None        Hyperglycemia/Diabetes Medications:   Antihyperglycemics (From admission, onward)    Start     Stop Route Frequency Ordered    05/05/19 0924  insulin aspart U-100 pen 0-5 Units      -- SubQ Before meals & nightly PRN 05/05/19 0825    05/03/19 0945  insulin regular (Humulin R) 100 Units in sodium chloride 0.9% 100 mL infusion      -- IV Continuous 05/03/19 0836    05/03/19 0935  insulin aspart U-100 pen 0-5 Units      -- SubQ As needed (PRN) 05/03/19 0836          ASSESSMENT and PLAN    * Ureteral transection of left native kidney  Managed per primary team  Avoid hypoglycemia        Type 2 diabetes mellitus with diabetic peripheral angiopathy without gangrene  BG goal 140 - 180    BG is within or below goal on tansition IV insulin Infusion.     continue transition IV insulin infusion with stepdown parameters. Initial rate starting at 1.5 (BG is reasonably controlled  while on TF. TF are now at goal).  Low Dose SQ Insulin Correction Scale.  BG monitoring every 4 hours while on TF.        ** Please notify Endocrine for any change and/or advance in diet/TF**  ** Please call Endocrine for any BG related issues **    Discharge Recommendations:     TBD.             CAD (coronary artery disease)    Managed per primary team  Condition may cause insulin resistance       HLD (hyperlipidemia)  Managed per primary team  Condition may cause insulin resistance         Sleep apnea  May affect BG readings if uncontrolled        On enteral nutrition  May cause BG excursions.           Uriah Holder, NP  Endocrinology  [facility]

## 2019-05-06 NOTE — PROGRESS NOTES
Subjective/Objective  Interval History/Significant Events: 4x bloody BM overnight with decrease H/H; s/p 1u PRBC, quick clot to rectum. Trach collar at 5L. Remains in sinus tach.    Follow-up For: Procedure(s) (LRB):  Creation, Nephrostomy, Percutaneous (Left)    Post-Operative Day: 14 Days Post-Op     Objective:     Vital Signs (Most Recent):  Temp: 99.5 °F (37.5 °C) (05/06/19 1100)  Pulse: 102 (05/06/19 1215)  Resp: (!) 25 (05/06/19 1215)  BP: (!) 147/64 (05/06/19 1200)  SpO2: 97 % (05/06/19 1215) Vital Signs (24h Range):  Temp:  [99.3 °F (37.4 °C)-100.4 °F (38 °C)] 99.5 °F (37.5 °C)  Pulse:  [] 102  Resp:  [5-41] 25  SpO2:  [89 %-100 %] 97 %  BP: ()/(52-84) 147/64     Weight: 77.2 kg (170 lb 3.1 oz)  Body mass index is 29.21 kg/m².      Intake/Output Summary (Last 24 hours) at 5/6/2019 1245  Last data filed at 5/6/2019 1200  Gross per 24 hour   Intake 2218.07 ml   Output 895 ml   Net 1323.07 ml       Physical Exam   Constitutional: She appears well-developed and well-nourished.   HENT:   Head: Normocephalic and atraumatic.   Tracheostomy in place   Eyes: Right eye exhibits no discharge. Left eye exhibits no discharge.   Neck: Normal range of motion. Neck supple.   Cardiovascular: Normal rate and regular rhythm.   Pulmonary/Chest: Effort normal. No respiratory distress.   Trach collar.    Abdominal:   Midline incision c/d/i.  MADHURI with scant serous drainage. PEG draining normal gastric contents. Abdomen soft, non-distended. Rectal tube with watery brown output.     Genitourinary:   Genitourinary Comments: Nephrostomy tube in place with watery dark yellow output.  Fontenot catheter+   Musculoskeletal: Normal range of motion.   Neurological:   Able to follow commands, denying pain.    Skin: Skin is warm and dry.   Vitals reviewed.      Lines/Drains/Airways     Drain                 Closed/Suction Drain 04/21/19 0300 LLQ 15 days         Nephrostomy 04/21/19 0629 Left 8 Fr. 15 days         Urethral Catheter  19 1711 Latex 16 Fr. 15 days         Gastrostomy/Enterostomy 19 1134 Percutaneous endoscopic gastrostomy (PEG) LUQ decompression;feeding 4 days          Airway                 Surgical Airway 19 1107 Shiley Cuffed 4 days          Peripheral Intravenous Line                 Peripheral IV - Single Lumen 19 0244 22 G Left Forearm 3 days         Midline Catheter Insertion/Assessment  - Single Lumen 19 1706 Left basilic vein (medial side of arm) 18g x 10cm 2 days                Significant Labs:    ABG:  None in the last 48 hrs    CBC/Anemia Profile:  Recent Labs   Lab 19  2156 19  0409 19  0917   WBC 10.28 10.89 20.00*   HGB 7.1* 6.7* 7.7*   HCT 22.5* 22.4* 24.8*   * 624* 595*   MCV 86 90 88   RDW 13.1 13.2 13.3        Chemistries:  Recent Labs   Lab 19  0412 19  1706 19  0409     --  138   K 3.6 3.5 3.9   CL 99  --  100   CO2 31*  --  31*   BUN 15  --  22*   CREATININE 0.8  --  0.8   CALCIUM 8.5*  --  8.2*   ALBUMIN 1.7*  --  1.7*   PROT 5.7*  --  6.0   BILITOT 1.1*  --  1.1*   ALKPHOS 107  --  100   ALT 19  --  17   AST 16  --  19   MG 1.5* 2.1 2.1   PHOS 2.6*  --  2.4*       Significant Imaging:  I have reviewed all pertinent imaging results/findings within the past 24 hours.      Scheduled Meds:   cefTRIAXone (ROCEPHIN) IVPB  2 g Intravenous Q24H    chlorhexidine  15 mL Mouth/Throat BID    nystatin-triamcinolone   Topical (Top) BID    pantoprazole  40 mg Intravenous BID    psyllium husk (aspartame)  1 packet Per G Tube Daily    rifAMpin (RIFADIN) IVPB  300 mg Intravenous Q12H    vancomycin (VANCOCIN) IVPB  1,000 mg Intravenous Q12H     Continuous Infusions:   heparin (porcine) in D5W Stopped (19 0600)    insulin (HUMAN R) infusion (adults) 1.5 Units/hr (19 1200)     PRN Meds:sodium chloride, acetaminophen, albuterol-ipratropium, dextrose 50%, dextrose 50%, [] fentaNYL **FOLLOWED BY** fentaNYL, glucagon (human  recombinant), glucose, glucose, glycopyrrolate, hydrALAZINE, insulin aspart U-100, insulin aspart U-100, magnesium oxide, magnesium oxide, nitroGLYCERIN, ondansetron, potassium chloride 10%, potassium chloride 10%, potassium chloride 10%, potassium, sodium phosphates, potassium, sodium phosphates, potassium, sodium phosphates, promethazine (PHENERGAN) IVPB, ramelteon, sodium chloride 0.9%    Vital signs in last 24 hours:  Temp:  [99.3 °F (37.4 °C)-100.4 °F (38 °C)] 99.5 °F (37.5 °C)  Pulse:  [] 103  Resp:  [5-41] 17  SpO2:  [89 %-100 %] 97 %  BP: ()/(52-84) 147/64    Intake/Output last 3 shifts:  I/O last 3 completed shifts:  In: 3001.5 [I.V.:1641.5; NG/GT:1360]  Out: 1260 [Urine:1195; Drains:65]  Intake/Output this shift:  I/O this shift:  In: 435 [Blood:235; NG/GT:200]  Out: 282 [Urine:275; Drains:7]    Problem Assessment/Plan  Cardiac/Vascular  CAD (coronary artery disease)  Assessment & Plan  ASSESSMENT/PLAN:   Mariann Huff is a 59 y.o. female s/p left ureteral injury on 4/12, who presented with fever, AMS, and intraabdominal abscess, taken for washout and ligation of left ureter with nephrostomy tube placement on 4/21. S/p Trach/PEG on 5/2.     Neuro:   - Pain control with fentanyl prn   - Romeltion prn     Pulmonary:   - Been tolerating trach collar since 5/3  - CXR prn  - ABGs prn      Cardiac:   - HDS at this time.  Pt has been weaned off pressors.   - TTE ordered yesterday, EF 55%     Renal:    - S/p transection of left ureter 4/12 and subsequent ligation and PCT nephrostomy tube placement with IR 4/21  - Renal function improving.    - Monitor I/Os  - Hold Lasix       Intake/Output Summary (Last 24 hours) at 5/6/2019 1249  Last data filed at 5/6/2019 1200  Gross per 24 hour   Intake 2218.07 ml   Output 895 ml   Net 1323.07 ml          ID:   - Blood cultures 4/12 +E. Coli; sensitivities pending. Repeat Bld Cx show NGTD.   - UCx from 4/20 growing E. Coli; sensitivities are now back. Repeat Cx  pending.   - ID following for assistance with abx therapy, currently on Ceftriaxone, Rifampin and vancomycin.  - AF overnight  - WBC trending down     Hem/Onc:   - H/H decreased overnight to 7.2 from 8.3; cont to monitor  - Hep gtt started for RUE DVT, however holding this for now given bloody BM      Endocrine:  - DM Type 2 at baseline    - TF at goal  - Will transition to SSI   - Endocrine following for assistance with blood glucose control.      Fluids/Electrolytes/Nutrition/GI:   # Shock Liver          - Resolved           - Labs continue to show improvement          - Elevated LFTs now normalized          - Thrombocytopenia; plts count improving; plts normalized          - INR normalized to 0.9     # Lactic Acidosis          - Now resolved    # Diarrhea  - TF at goal now, will add bulking agents  - Frequent BMs, some bloody  - Start free water flushes 300 cc q6h  - Replace electrolytes PRN  - Will contact GI or CRS if bloody BMs continue        PPx:  - DVT: Lovenox        DISPO:  - Continue SICU care  - PT/OT/SLP consults placed              Critical care was time spent personally by me on the following activities: development of treatment plan with patient or surrogate and bedside caregivers, discussions with consultants, evaluation of patient's response to treatment, examination of patient, ordering and performing treatments and interventions, ordering and review of laboratory studies, ordering and review of radiographic studies, pulse oximetry, re-evaluation of patient's condition.  This critical care time did not overlap with that of any other provider or involve time for any procedures.     Zoë Du MD CA-1  Critical Care - Surgery

## 2019-05-06 NOTE — PLAN OF CARE
Problem: Adult Inpatient Plan of Care  Goal: Plan of Care Review  Outcome: Ongoing (interventions implemented as appropriate)  POC reviewed with Mariann Huff and family at 1700. Pt demonstrated understanding via written communication. Questions and concerns addressed. No acute events today. Pt had four bloody BMs. H/H dropped. No blood products needed at this time. Pt sat in cardiac chair for two hours. Pt progressing toward goals. Will continue to monitor. See flowsheets for full assessment and VS info.

## 2019-05-06 NOTE — PROGRESS NOTES
"[facility]  Adult Nutrition  Progress Note    SUMMARY       Recommendations  Recommendation/Intervention:   1. Current TF providing < 85% EEN. Recommend modifying to Glucerna 1.5 at a goal rate of 45 mL/hr - to provide 1620 kcal/day, 89g protein/day, and 820mL free fluid/day.   2. PO diet per SLP.   RD to monitor.    Goals: Patient to receive nutrition by RD follow-up  Nutrition Goal Status: goal met  Communication of RD Recs: reviewed with RN    Reason for Assessment  Reason For Assessment: RD follow-up  Diagnosis: surgery/postoperative complications(ureteral transection of L kidney)  Relevant Medical History: CAD, DM2, HTN, HLD  General Information Comments: S/p trach 5/2. On trach collar. Tolerating TF at goal rate. Physical exam remains unchanged, continues to appear nourished with no physical signs of malnutrition at this time.  Nutrition Discharge Planning: Unable to determine at this time.    Nutrition Risk Screen  Nutrition Risk Screen: tube feeding or parenteral nutrition    Nutrition/Diet History  Spiritual, Cultural Beliefs, Congregation Practices, Values that Affect Care: no  Factors Affecting Nutritional Intake: NPO    Anthropometrics  Temp: 99.5 °F (37.5 °C)  Height Method: Estimated  Height: 5' 4" (162.6 cm)  Height (inches): 64 in  Weight Method: Bed Scale  Weight: 77.2 kg (170 lb 3.1 oz)  Weight (lb): 170.2 lb  Ideal Body Weight (IBW), Female: 120 lb  % Ideal Body Weight, Female (lb): 146.06 lb  BMI (Calculated): 30.1  BMI Grade: 30 - 34.9- obesity - grade I    Lab/Procedures/Meds  Pertinent Labs Reviewed: reviewed  Pertinent Labs Comments: BUN 22, Glu 167, Ca 8.2, Phos 2.4, Alb 1.7  Pertinent Medications Reviewed: reviewed  Pertinent Medications Comments: pantoprazole, psyllium, insulin drip    Estimated/Assessed Needs  Weight Used For Calorie Calculations: 71.4 kg (157 lb 6.5 oz)(admit weight)  Energy Calorie Requirements (kcal): 1599 kcal/day  Energy Need Method: Frontier-[place]( " x 1.25)  Protein Requirements:  g/day(1.2-1.4 g/kg)  Weight Used For Protein Calculations: 71.4 kg (157 lb 6.5 oz)(admit weight)  Fluid Requirements (mL): 1 mL/kcal or per MD  Estimated Fluid Requirement Method: RDA Method  RDA Method (mL): 1599    Nutrition Prescription Ordered  Current Diet Order: NPO  Current Nutrition Support Formula Ordered: Peptamen Intense VHP  Current Nutrition Support Rate Ordered: 40 (ml)  Current Nutrition Support Frequency Ordered: mL/hr    Evaluation of Received Nutrient/Fluid Intake  Enteral Calories (kcal): 960  Enteral Protein (gm): 88  Enteral (Free Water) Fluid (mL): 806  % Kcal Needs: 60  % Protein Needs: 100  Other Fluid (mL): 1200(300mL q 6 hours)  Total Fluid Intake (mL): 2006  I/O: +1.3L x 24hrs, +3.8L since admit  Energy Calories Required: not meeting needs  Protein Required: meeting needs  Fluid Required: (per MD)  Comments: LBM 5/5  Tolerance: tolerating  % Intake of Estimated Energy Needs: 50 - 75 %  % Meal Intake: NPO    Nutrition Risk  Level of Risk/Frequency of Follow-up: low(1x/week)     Assessment and Plan  Nutrition Problem  Inadequate energy intake     Related to (etiology):   Decreased ability to consume sufficient energy     Signs and Symptoms (as evidenced by):   NPO, TF providing < 85% EEN and EPN     Interventions/Recommendations (treatment strategy):  Collaboration and referral of nutrition care     Nutrition Diagnosis Status:   Continues    Monitor and Evaluation  Food and Nutrient Intake: energy intake, enteral nutrition intake  Food and Nutrient Adminstration: enteral and parenteral nutrition administration  Anthropometric Measurements: weight, weight change  Biochemical Data, Medical Tests and Procedures: electrolyte and renal panel, gastrointestinal profile, glucose/endocrine profile, inflammatory profile  Nutrition-Focused Physical Findings: overall appearance     Nutrition Follow-Up  RD Follow-up?: Yes

## 2019-05-06 NOTE — SUBJECTIVE & OBJECTIVE
Interval History/Significant Events: 4x bloody BM overnight with decrease H/H; s/p 1u PRBC, quick clot to rectum. Trach collar at 5L. Remains in sinus tach.    Follow-up For: Procedure(s) (LRB):  Creation, Nephrostomy, Percutaneous (Left)    Post-Operative Day: 14 Days Post-Op     Objective:     Vital Signs (Most Recent):  Temp: 99.5 °F (37.5 °C) (05/06/19 1100)  Pulse: 102 (05/06/19 1215)  Resp: (!) 25 (05/06/19 1215)  BP: (!) 147/64 (05/06/19 1200)  SpO2: 97 % (05/06/19 1215) Vital Signs (24h Range):  Temp:  [99.3 °F (37.4 °C)-100.4 °F (38 °C)] 99.5 °F (37.5 °C)  Pulse:  [] 102  Resp:  [5-41] 25  SpO2:  [89 %-100 %] 97 %  BP: ()/(52-84) 147/64     Weight: 77.2 kg (170 lb 3.1 oz)  Body mass index is 29.21 kg/m².      Intake/Output Summary (Last 24 hours) at 5/6/2019 1245  Last data filed at 5/6/2019 1200  Gross per 24 hour   Intake 2218.07 ml   Output 895 ml   Net 1323.07 ml       Physical Exam   Constitutional: She appears well-developed and well-nourished.   HENT:   Head: Normocephalic and atraumatic.   Tracheostomy in place   Eyes: Right eye exhibits no discharge. Left eye exhibits no discharge.   Neck: Normal range of motion. Neck supple.   Cardiovascular: Normal rate and regular rhythm.   Pulmonary/Chest: Effort normal. No respiratory distress.   Trach collar.    Abdominal:   Midline incision c/d/i.  MADHURI with scant serous drainage. PEG draining normal gastric contents. Abdomen soft, non-distended. Rectal tube with watery brown output.     Genitourinary:   Genitourinary Comments: Nephrostomy tube in place with watery dark yellow output.  Fontenot catheter+   Musculoskeletal: Normal range of motion.   Neurological:   Able to follow commands, denying pain.    Skin: Skin is warm and dry.   Vitals reviewed.      Lines/Drains/Airways     Drain                 Closed/Suction Drain 04/21/19 0300 LLQ 15 days         Nephrostomy 04/21/19 0629 Left 8 Fr. 15 days         Urethral Catheter 04/20/19 1711 Latex 16  Fr. 15 days         Gastrostomy/Enterostomy 05/02/19 1134 Percutaneous endoscopic gastrostomy (PEG) LUQ decompression;feeding 4 days          Airway                 Surgical Airway 05/02/19 1107 Shiley Cuffed 4 days          Peripheral Intravenous Line                 Peripheral IV - Single Lumen 05/03/19 0244 22 G Left Forearm 3 days         Midline Catheter Insertion/Assessment  - Single Lumen 05/03/19 1706 Left basilic vein (medial side of arm) 18g x 10cm 2 days                Significant Labs:    ABG:  None in the last 48 hrs    CBC/Anemia Profile:  Recent Labs   Lab 05/05/19  2156 05/06/19  0409 05/06/19  0917   WBC 10.28 10.89 20.00*   HGB 7.1* 6.7* 7.7*   HCT 22.5* 22.4* 24.8*   * 624* 595*   MCV 86 90 88   RDW 13.1 13.2 13.3        Chemistries:  Recent Labs   Lab 05/05/19  0412 05/05/19  1706 05/06/19  0409     --  138   K 3.6 3.5 3.9   CL 99  --  100   CO2 31*  --  31*   BUN 15  --  22*   CREATININE 0.8  --  0.8   CALCIUM 8.5*  --  8.2*   ALBUMIN 1.7*  --  1.7*   PROT 5.7*  --  6.0   BILITOT 1.1*  --  1.1*   ALKPHOS 107  --  100   ALT 19  --  17   AST 16  --  19   MG 1.5* 2.1 2.1   PHOS 2.6*  --  2.4*       Significant Imaging:  I have reviewed all pertinent imaging results/findings within the past 24 hours.

## 2019-05-06 NOTE — PLAN OF CARE
Problem: Occupational Therapy Goal  Goal: Occupational Therapy Goal  Goals to be met by: 5/20/19     Patient will increase functional independence with ADLs by performing:    UE Dressing with Set-up Assistance.  LE Dressing with Maximum Assistance and Assistive Devices as needed.  Grooming while standing with Minimal Assistance.  Toileting from bedside commode with Minimal Assistance for hygiene and clothing management.   Sitting at edge of bed x10 minutes with Supervision.  Rolling to Bilateral with Minimal Assistance.   Supine to sit with Minimal Assistance.  Toilet transfer to bedside commode with Minimal Assistance.    Outcome: Ongoing (interventions implemented as appropriate)  Eval completed, goals established    Comments: Initiate OT POC     Ana Miller OT  5/6/2019

## 2019-05-06 NOTE — PLAN OF CARE
Problem: Adult Inpatient Plan of Care  Goal: Plan of Care Review  Outcome: Ongoing (interventions implemented as appropriate)  Patient currently resting comfortably in bed on trach collar - 5L. HR 's sinus rhythm, MAP>65. 120mL urine drained from correa, 200mL from urostomy. 15mL straw colored output from MADHURI. 1 unit PRBC's currently infusing. Bloody BM x3, QuikClot applied to rectum with decreased bloody output noted. Patient tolerated tube feeds at 40mL/hr. Pain well controlled with fentanyl IVP. Plan to continue to monitor for signs of bleeding and keep patient comfortable. Patient and spouse updated regarding plan of care - questions answered. Will continue to monitor.

## 2019-05-06 NOTE — ASSESSMENT & PLAN NOTE
Elizaamanda Huff is a 59 y.o. female s/p left ureteral injury on 4/12, presented with fever, AMS, and intraabdominal abscess, taken for washout and ligation of left ureter with neph tube placement on 4/21, Trach/PEG 5/2.    - tube feeds per SICU  - Trach per SICU  - Rocephin, Rifampin, Vancomycin  - Maintain correa, neph tube, and MADHURI drain  - continue ICU care  - transfusing 1U pRBCs;  may consult GI if she continues to have bloody BMs

## 2019-05-06 NOTE — SUBJECTIVE & OBJECTIVE
"Interval HPI:   Overnight events:   BG is within or slightly above goal on current IV insulin infusion. NAEON.     Eating:   NPO  Nausea: No.  Hypoglycemia and intervention: No.  Fever: No.  TPN and/or TF: Yes (Tube Feeds)  If yes, type of TF/TPN and rate: 40 ml/hr.  (at goal)    BP (P) 132/75 (BP Location: Right arm)   Pulse 104   Temp 99.5 °F (37.5 °C) (Oral)   Resp (!) 22   Ht 5' 4" (1.626 m)   Wt 77.2 kg (170 lb 3.1 oz)   SpO2 98%   Breastfeeding? No   BMI 29.21 kg/m²     Labs Reviewed and Include    Recent Labs   Lab 05/06/19  0409   *   CALCIUM 8.2*   ALBUMIN 1.7*   PROT 6.0      K 3.9   CO2 31*      BUN 22*   CREATININE 0.8   ALKPHOS 100   ALT 17   AST 19   BILITOT 1.1*     Lab Results   Component Value Date    WBC 20.00 (H) 05/06/2019    HGB 7.7 (L) 05/06/2019    HCT 24.8 (L) 05/06/2019    MCV 88 05/06/2019     (H) 05/06/2019     No results for input(s): TSH, FREET4 in the last 168 hours.  Lab Results   Component Value Date    HGBA1C 10.8 (H) 02/19/2019       Nutritional status:   Body mass index is 29.21 kg/m².  Lab Results   Component Value Date    ALBUMIN 1.7 (L) 05/06/2019    ALBUMIN 1.7 (L) 05/05/2019    ALBUMIN 1.7 (L) 05/04/2019     No results found for: PREALBUMIN    Estimated Creatinine Clearance: 77.1 mL/min (based on SCr of 0.8 mg/dL).    Accu-Checks  Recent Labs     05/05/19  0419 05/05/19  0822 05/05/19  1416 05/05/19  1703 05/05/19  1808 05/05/19  2016 05/06/19  0004 05/06/19  0414 05/06/19  0817 05/06/19  1157   POCTGLUCOSE 193* 176* 296* 164* 194* 162* 229* 208* 191* 203*       Current Medications and/or Treatments Impacting Glycemic Control  Immunotherapy:    Immunosuppressants     None        Steroids:   Hormones (From admission, onward)    None        Pressors:    Autonomic Drugs (From admission, onward)    None        Hyperglycemia/Diabetes Medications:   Antihyperglycemics (From admission, onward)    Start     Stop Route Frequency Ordered    05/05/19 " 0924  insulin aspart U-100 pen 0-5 Units      -- SubQ Before meals & nightly PRN 05/05/19 0825    05/03/19 0945  insulin regular (Humulin R) 100 Units in sodium chloride 0.9% 100 mL infusion      -- IV Continuous 05/03/19 0836    05/03/19 0935  insulin aspart U-100 pen 0-5 Units      -- SubQ As needed (PRN) 05/03/19 0836

## 2019-05-06 NOTE — ASSESSMENT & PLAN NOTE
ASSESSMENT/PLAN:   Mariann Huff is a 59 y.o. female s/p left ureteral injury on 4/12, who presented with fever, AMS, and intraabdominal abscess, taken for washout and ligation of left ureter with nephrostomy tube placement on 4/21. S/p Trach/PEG on 5/2.     Neuro:   - Pain control with fentanyl prn   - Romeltion prn     Pulmonary:   - Been tolerating trach collar since 5/3  - CXR prn  - ABGs prn      Cardiac:   - HDS at this time.  Pt has been weaned off pressors.   - TTE ordered yesterday, EF 55%     Renal:    - S/p transection of left ureter 4/12 and subsequent ligation and PCT nephrostomy tube placement with IR 4/21  - Renal function improving.    - Monitor I/Os  - Hold Lasix       Intake/Output Summary (Last 24 hours) at 5/6/2019 1249  Last data filed at 5/6/2019 1200  Gross per 24 hour   Intake 2218.07 ml   Output 895 ml   Net 1323.07 ml          ID:   - Blood cultures 4/12 +E. Coli; sensitivities pending. Repeat Bld Cx show NGTD.   - UCx from 4/20 growing E. Coli; sensitivities are now back. Repeat Cx pending.   - ID following for assistance with abx therapy, currently on Ceftriaxone, Rifampin and vancomycin.  - AF overnight  - WBC trending down     Hem/Onc:   - H/H decreased overnight to 7.2 from 8.3; cont to monitor  - Hep gtt started for RUE DVT, however holding this for now given bloody BM      Endocrine:  - DM Type 2 at baseline    - TF at goal  - Will transition to SSI   - Endocrine following for assistance with blood glucose control.      Fluids/Electrolytes/Nutrition/GI:   # Shock Liver          - Resolved           - Labs continue to show improvement          - Elevated LFTs now normalized          - Thrombocytopenia; plts count improving; plts normalized          - INR normalized to 0.9     # Lactic Acidosis          - Now resolved    # Diarrhea  - TF at goal now, will add bulking agents  - Frequent BMs, some bloody  - Start free water flushes 300 cc q6h  - Replace electrolytes PRN  - Will  contact GI or CRS if bloody BMs continue        PPx:  - DVT: Lovenox        DISPO:  - Continue SICU care  - PT/OT/SLP consults placed              Critical care was time spent personally by me on the following activities: development of treatment plan with patient or surrogate and bedside caregivers, discussions with consultants, evaluation of patient's response to treatment, examination of patient, ordering and performing treatments and interventions, ordering and review of laboratory studies, ordering and review of radiographic studies, pulse oximetry, re-evaluation of patient's condition.  This critical care time did not overlap with that of any other provider or involve time for any procedures.     Zoë Du MD CA-1  Critical Care - Surgery

## 2019-05-06 NOTE — PLAN OF CARE
Problem: SLP Goal  Goal: SLP Goal  Speech Language Pathology Goals  Goals expected to be met by 5/11  1.  Pt/family education regarding PMSV use/benefits/precautions.  2.  Pt will tolerate PMSV for 5 minutes with no s/s distress.       Pt alert and participating with ST.  Mrs. Huff continues to present with reduced tolerance of Passy Katina Speaking Valve trials.  When medically appropriate, recommend MD team consider downsize trach to #6 in hopes of increasing success with Passy Katina Speaking Valve tolerance.  Session note to follow.     MAURICE Cortez, CCC-SLP  Speech Language Pathologist  [phone]  5/6/2019

## 2019-05-06 NOTE — PT/OT/SLP EVAL
Physical Therapy Evaluation and treatment    Patient Name:  Mariann Huff   MRN:  [id number]    Recommendations:     Discharge Recommendations:  rehabilitation facility   Discharge Equipment Recommendations: (will determine DME needs closer to discharge)   Barriers to discharge: Decreased caregiver support family may not be able to assist pt at current functional level.     Assessment:     Mariann Huff is a 59 y.o. female admitted with a medical diagnosis of Ureteral transection of left native kidney.  She presents with the following impairments/functional limitations:  gait instability, impaired balance, impaired endurance, impaired functional mobilty, decreased lower extremity function pt kenyetta treatment well and will benefit from skilled PT 4x/wk to progress physically. Pt will need inpt rehab when medically stable to maximize rehab potential. Pt is s/p exp lap, ligation L ureter  4/21, trach/PEG 5/2. Pt is s/p back surgery 4/12 (previous admit).     Rehab Prognosis: Good; patient would benefit from acute skilled PT services to address these deficits and reach maximum level of function.    Recent Surgery: Procedure(s) (LRB):  CREATION, TRACHEOSTOMY (N/A)  BRONCHOSCOPY (N/A)  EGD, WITH PEG TUBE INSERTION 4 Days Post-Op    Plan:     During this hospitalization, patient to be seen 4 x/week to address the identified rehab impairments via gait training, therapeutic activities, therapeutic exercises and progress toward the following goals:    · Plan of Care Expires:  06/03/19    Subjective     Chief Complaint: pt c/o pain during treatment.   Patient/Family Comments/goals:  To get better and go home.   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 They live in [address] [place] [address]   Prior to  admission, patients level of function was prior to back surgery Independent, pt is using RW since 19..  Equipment used at home: walker, rolling(built in bench in shower).  DME owned (not currently used): none.  Upon discharge, patient will have assistance from  who will take time off of work. .    Objective:     Communicated with nurse prior to session.  Patient found supine with telemetry, pulse ox (continuous), blood pressure cuff, correa catheter, tracheostomy, oxygen, PEG Tube, MADHURI drain(t-tube)  upon PT entry to room.    General Precautions: Standard, fall   Orthopedic Precautions:    Braces: LSO     Exams:  · Cognitive Exam:  Patient is oriented to Person, Place, Time and Situation  · RLE ROM: WFL  · RLE Strength: WFL  · LLE ROM: WFL  · LLE Strength: WFL    Functional Mobility:  · Bed Mobility:   Pt needed verbal cues for hand placement and sequencing for functional mobility.   · Rolling Left:  maximal assistance  · Rolling Right: maximal assistance  · Supine to Sit: maximal assistance  · Sit to Supine: maximal assistance  ·   · Transfers:     · Sit to Stand:  maximal assistance with rolling walker. Pt was total assist Don/Doff LSO brace.  ·   · Gait: pt received gait training ~ 2 side steps to head of bed with LSO on, RW and moderate assist.         AM-PAC 6 CLICK MOBILITY  Total Score:11     Patient left supine with all lines intact and call button in reach.    GOALS:   Multidisciplinary Problems     Physical Therapy Goals        Problem: Physical Therapy Goal    Goal Priority Disciplines Outcome Goal Variances Interventions   Physical Therapy Goal     PT, PT/OT      Description:  Goals to be met by: 19    Patient will increase functional independence with mobility by performin. Supine to sit with Moderate Assistance -not met  2. Sit to stand transfer with Minimal Assistance -not met  3. Gait  x 30 feet with Contact Guard Assistance using Rolling Walker. -not met  4. Lower extremity  exercise program x15 reps , with assistance as needed-not met                      History:     Past Medical History:   Diagnosis Date    Anticoagulant long-term use     Asthma     Back pain     CAD (coronary artery disease)     s/p stentimg  (2), (1)    Carotid artery stenosis     Diabetes mellitus type 2 in obese     HTN (hypertension), benign     Hyperlipidemia     Keloid cicatrix     NPDR (nonproliferative diabetic retinopathy) 2015    NSTEMI (non-ST elevated myocardial infarction)     Nuclear sclerosis - Right Eye 3/18/2014    Primary localized osteoarthrosis, lower leg 2014    Sleep apnea        Past Surgical History:   Procedure Laterality Date    BRONCHOSCOPY N/A 2019    Performed by [facility] at [facility] OR 2ND FLR    CARDIAC CATHETERIZATION      cataract extraction left eye      cataracts      CATHETERIZATION, HEART, LEFT Left 2014    Performed by Wilman Kim MD at [facility] CATH LAB     SECTION, LOW TRANSVERSE      CORONARY ANGIOPLASTY      Creation, Nephrostomy, Percutaneous Left 2019    Performed by Karina Surgeon at [facility] KARINA    CREATION, TRACHEOSTOMY N/A 2019    Performed by Sean [facility] MD at [facility] OR 2ND FLR    EGD, WITH PEG TUBE INSERTION  2019    Performed by [facility] MD at [facility] OR 2ND FLR    ESOPHAGOGASTRODUODENOSCOPY (EGD) N/A 2016    Performed by Gardenia Adamson MD at [facility] ENDO (4TH FLR)    EXCISION TURBINATE, SUBMUCOUS      FUSION, SPINE, LUMBAR, ANTERIOR APPROACH Left L5-S1 Anterior to Psoa Interbody Fusion, L5-S1 Posterior Instrumentation Left 2019    Performed by Mk George MD at [facility] OR 2ND FLR    HAND SURGERY Left     HAND SURGERY Right     torn ligament repair/ Dr. Yeboah    HYSTERECTOMY      Injection,steroid,epidural,transforaminal approach - Bilateral - S1 Bilateral 2018    [facility]     Injection-steroid-epidural-caudal N/A 2/7/2019    Performed by Dave Bentley Jr., MD at [facility] PAIN MGT    INSERTION-INTRAOCULAR LENS (IOL) Right 9/1/2015    Performed by Good Domingo MD at [facility] OR 1ST FLR    LAPAROTOMY, EXPLORATORY; LIGATION OF LEFT URETER; DOUBLE J STENT REMOVAL LEFT URETER Left 4/20/2019    Performed by Paul Carlson MD at [facility] OR 2ND FLR    left foot surgery      left wrist surgery      NASAL SEPTUM SURGERY  5/7/15    PHACOEMULSIFICATION-ASPIRATION-CATARACT Right 9/1/2015    Performed by Good Domingo MD at [facility] OR 1ST FLR    REPAIR, URETER  4/12/2019    Performed by Mk George MD at [facility] OR 2ND FLR    RESECTION-TURBINATES (SMR) N/A 5/7/2015    Performed by Dileep Dubois III, MD at [facility] OR 2ND [facility]    rt elbow surgery      S/P LAD COATED STENT  05/14/2010    6 total     S/P OM1 STENT  08/2003    SEPTOPLASTY N/A 5/7/2015    Performed by Dileep Dubois III, MD at [facility] OR 2ND FLR    SINUS SURGERY      F.E.S.S.    SINUS SURGERY FUNCTIONAL ENDOSCOPIC WITH NAVIGATION WITH MAXILLARIES, ETHMOIDS, LEFT SPHENOID, LEFT LOLY N/A 5/7/2015    Performed by Dileep Dubois III, MD at [facility] OR 2ND [facility]    STENT, URETERAL placement  4/12/2019    [facility]    TUBAL LIGATION         Time Tracking:     PT Received On: 05/06/19  PT Start Time: 1347     PT Stop Time: 1408  PT Total Time (min): 21 min     Billable Minutes: Evaluation 11min and Gait Training 10 min      Cathy Taylor, PT  05/06/2019

## 2019-05-06 NOTE — SUBJECTIVE & OBJECTIVE
Interval History: No AEON.  Tmax 100.4, T current 99.3  RUE US show partially occlusive thrombus.  The patient denies any recent fever, chills, or sweats.      Review of Systems   Constitutional: Negative for activity change, chills, diaphoresis and fever.   Respiratory: Negative for cough, shortness of breath and wheezing.    Cardiovascular: Negative for chest pain.   Gastrointestinal: Negative for abdominal pain, constipation, diarrhea, nausea and vomiting.   Genitourinary: Negative for dysuria, frequency and urgency.   Neurological: Negative for dizziness.   Hematological: Does not bruise/bleed easily.     Objective:     Vital Signs (Most Recent):  Temp: 99.3 °F (37.4 °C) (05/06/19 0900)  Pulse: 101 (05/06/19 0915)  Resp: (!) 29 (05/06/19 0915)  BP: (!) 176/84 (05/06/19 0915)  SpO2: 96 % (05/06/19 0915) Vital Signs (24h Range):  Temp:  [99.3 °F (37.4 °C)-100.4 °F (38 °C)] 99.3 °F (37.4 °C)  Pulse:  [] 101  Resp:  [5-41] 29  SpO2:  [89 %-100 %] 96 %  BP: ()/(52-84) 176/84     Weight: 77.2 kg (170 lb 3.1 oz)  Body mass index is 29.21 kg/m².    Estimated Creatinine Clearance: 77.1 mL/min (based on SCr of 0.8 mg/dL).    Physical Exam   Constitutional: She is oriented to person, place, and time. She appears well-developed and well-nourished. No distress.       HENT:   Head: Normocephalic and atraumatic.   Cardiovascular: Normal rate, regular rhythm and normal heart sounds. Exam reveals no gallop and no friction rub.   No murmur heard.  Pulmonary/Chest: Effort normal and breath sounds normal. No respiratory distress. She has no wheezes. She has no rales.   Abdominal: Soft. Bowel sounds are normal. She exhibits no distension and no mass. There is no tenderness. There is no rebound and no guarding.   Musculoskeletal: She exhibits no edema.   Neurological: She is alert and oriented to person, place, and time.   Skin: Skin is warm and dry. She is not diaphoretic.   Psychiatric: She has a normal mood and affect.  Her behavior is normal.       Significant Labs:   Blood Culture:   Recent Labs   Lab 04/23/19  0430 04/23/19  0500 04/24/19  0950 04/30/19  0230 04/30/19  0247   LABBLOO No growth after 5 days. No growth after 5 days. No growth after 5 days. No growth after 5 days. No growth after 5 days.     CBC:   Recent Labs   Lab 05/05/19  1653 05/05/19  2156 05/06/19  0409   WBC 11.05 10.28 10.89   HGB 7.2* 7.1* 6.7*   HCT 23.0* 22.5* 22.4*   * 559* 624*     CMP:   Recent Labs   Lab 05/05/19  0412 05/05/19  1706 05/06/19  0409     --  138   K 3.6 3.5 3.9   CL 99  --  100   CO2 31*  --  31*   *  --  167*   BUN 15  --  22*   CREATININE 0.8  --  0.8   CALCIUM 8.5*  --  8.2*   PROT 5.7*  --  6.0   ALBUMIN 1.7*  --  1.7*   BILITOT 1.1*  --  1.1*   ALKPHOS 107  --  100   AST 16  --  19   ALT 19  --  17   ANIONGAP 9  --  7*   EGFRNONAA >60.0  --  >60.0     Respiratory Culture:   Recent Labs   Lab 04/30/19  0900   GSRESP <10 epithelial cells per low power field.  No WBC's  No organisms seen     Urine Culture:   Recent Labs   Lab 04/20/19  1711 04/21/19  0640 04/21/19  2256 04/25/19  0252   LABURIN ESCHERICHIA COLI  >100,000 cfu/ml   No growth No significant growth No growth     Urine Studies:   Recent Labs   Lab 04/25/19  0252   COLORU Argelia   APPEARANCEUA Cloudy*   PHUR 5.0   SPECGRAV 1.020   PROTEINUA 1+*   GLUCUA 3+*   KETONESU 1+*   BILIRUBINUA Negative   OCCULTUA 3+*   NITRITE Negative   LEUKOCYTESUR 3+*   RBCUA 25*   WBCUA 55*   BACTERIA Few*   SQUAMEPITHEL 1   HYALINECASTS 0     Wound Culture:   Recent Labs   Lab 04/21/19  0125   LABAERO STAPHYLOCOCCUS LUGDUNENSIS  Rare       All pertinent labs within the past 24 hours have been reviewed.    Significant Imaging: I have reviewed all pertinent imaging results/findings within the past 24 hours.   X-Ray Chest 1 View [[id number]] Resulted: 05/04/19 0726   Order Status: Completed Updated: 05/04/19 0728   Narrative:     EXAMINATION:  XR CHEST 1 VIEW    CLINICAL  HISTORY:  trying to wean from vent, also, using CXR to guide diuresis;    TECHNIQUE:  Single frontal view of the chest was performed.    COMPARISON:  05/03/2019    FINDINGS:  Tracheostomy catheter noted.  There is mild bilateral interstitial prominence.  No confluent consolidation.  No pleural effusion or pneumothorax.  Cardiomediastinal silhouette is unremarkable.   Impression:       As above.      Electronically signed by: Anders Lindsay MD  Date: 05/04/2019  Time: 07:26   US Upper Extremity Veins Right [[id number]] Resulted: 05/03/19 1842   Order Status: Completed Updated: 05/03/19 1845   Narrative:     EXAMINATION:  US UPPER EXTREMITY VEINS RIGHT    CLINICAL HISTORY:  Right arm swelling;    TECHNIQUE:  Duplex and color flow Doppler evaluation and dynamic compression was performed of the right upper extremity veins.    COMPARISON:  No relevant prior imaging for comparison    FINDINGS:  Central veins: There is partially occlusive thrombus of the distal right internal jugular vein extending into the proximal right subclavian vein.  The axillary vein is patent and free of thrombus.    Arm veins: The brachial, basilic, and cephalic veins are patent and compressible.    Contralateral subclavian/internal jugular veins: The left subclavian and internal jugular veins are patent and free of thrombus.    Other findings: None.   Impression:       Partially occlusive thrombus of the distal right internal jugular vein extending into the proximal right subclavian vein.    COMMUNICATION  This critical result was discovered/received at 1812.  The critical information above was relayed directly by John Obrien M.D. by telephone to Dr. Espino On 05/03/2019 at 1820.    Electronically signed by resident: John Obrien  Date: 05/03/2019  Time: 18:10    Electronically signed by: Edin Lo MD  Date: 05/03/2019  Time: 18:42   X-Ray Chest 1 View [[id number]] Resulted: 05/03/19 1048   Order Status: Completed Updated: 05/03/19 1050    Narrative:     EXAMINATION:  XR CHEST 1 VIEW    CLINICAL HISTORY:  trying to wean from vent, also, using CXR to guide diuresis;    TECHNIQUE:  Single frontal view of the chest was performed.    COMPARISON:  05/02/2019, 05:22 hours.    FINDINGS:  Tracheostomy cannula replaces the endotracheal tube cannula tip projects over the airway between the clavicular heads, in good location.    I detect no pneumothorax, pneumomediastinum, pneumoperitoneum or mediastinal shift and no acute pulmonary disease in this patient status post recent tracheostomy.  Pulmonary parenchymal aeration has improved since 05/02/2019 suggesting alterations in ventilatory pressure, volume status or both.   Impression:       Tracheostomy cannula is in good location.  No complication of tracheostomy identified.      Electronically signed by: Fay Loyola MD  Date: 05/03/2019  Time: 10:48   X-Ray Chest 1 View [[id number]] Resulted: 05/02/19 0806   Order Status: Completed Updated: 05/02/19 0809   Narrative:     EXAMINATION:  XR CHEST 1 VIEW    CLINICAL HISTORY:  difficuly weaning the vent, diuresing daily;    TECHNIQUE:  Single frontal view of the chest was performed.    COMPARISON:  05/01/2019    FINDINGS:  Endotracheal tube tip is seen at the level of the clavicles.  NG tube is seen coursing towards the stomach.  Trachea is patent.  Cardiac silhouette is normal in size.  There is atherosclerosis.  There has been interval development of bilateral opacities.  There is no effusion, pneumothorax or free air below the diaphragm.  There is no acute osseous abnormality.   Impression:       Interval development of multifocal bilateral opacities that could represent multifocal infection or pulmonary edema.      Electronically signed by: David Lo MD  Date: 05/02/2019  Time: 08:06   X-Ray Chest 1 View [[id number]] Resulted: 05/01/19 0744   Order Status: Completed Updated: 05/01/19 0747   Narrative:     EXAMINATION:  XR CHEST 1 VIEW    CLINICAL  HISTORY:  difficuly weaning the vent, diuresing daily;    TECHNIQUE:  Single frontal view of the chest was performed.    COMPARISON:  April 30, 2019.    FINDINGS:  ET tube NG tube and monitoring leads all remain.  Heart size pulmonary vessels are similar.  Lungs are hypoaerated.  Accentuation of the interstitial markings.  No pneumothorax.   Impression:       No detrimental change.  Right central venous catheter has been removed.      Electronically signed by: Mk Reilly MD  Date: 05/01/2019  Time: 07:44   CT Chest Abdoment Pelvis With Contrast [[id number]] (Abnormal) Resulted: 05/01/19 0445   Order Status: Completed Updated: 05/01/19 0448   Narrative:     EXAMINATION:  CT CHEST ABDOMEN PELVIS WITH CONTRAST (XPD)    CLINICAL HISTORY:  concern for intraabdominal abscess s/p spinal sugery complicated by ureteral injury;    TECHNIQUE:  Low dose axial images, sagittal and coronal reformations were obtained from the thoracic inlet to the pubic symphysis following the IV administration of 100 mL of Omnipaque 350 .  Oral contrast was not administered.    COMPARISON:  Chest radiograph 04/30/2019.  CT abdomen pelvis 04/20/2019. CT abdomen pelvis 06/03/2014.    FINDINGS:  There is an ET tube with its tip above the barb.  Enteric tube is present with its tip in the mid pylorus.    Chest:    Base of the neck: Left thyroid lobe 1.4 cm hypodense lesion.  Recommend nonemergent evaluation with thyroid ultrasound if not already performed.    Heart and great vessels: Heart is normal in size with minimal pericardial fluid.  Multivessel coronary atherosclerosis.  Aorta is normal in course and caliber.    Adenopathy: Few scattered mediastinal lymph nodes, none meeting enlargement by CT criteria.    Esophagus: Within normal limits.    Airways: ET tube present.    Lungs: Small volume bilateral pleural effusions with associated compressive atelectasis and consolidation in the bilateral lower lobes.    Abdomen:    Liver: Liver is  enlarged measuring 22 cm in the craniocaudal direction.  Diffusely decreased attenuation of the hepatic parenchyma, consistent with steatosis.  No focal hepatic lesion.  Portal vein is patent.    Gallbladder: No calcified gallstones.    Biliary system: No intra or extrahepatic biliary ductal dilatation.    Spleen: Hypoattenuating 2.1 cm lesion in the inferior medial aspect of the spleen is not well visualized on prior CT exams, likely an infarct.    Pancreas: No mass or peripancreatic fat stranding.    Adrenals: Within normal limits.    Kidneys: Normal in size and position. There is a left-sided nephrostomy tube present.  Mild fat stranding around the proximal/mid left ureter with possible surgical clip along its mid course.  Distal left ureter is not well visualized.  There has been resolution of the previously seen periureteral free air.  No hydronephrosis.  There are several bilateral renal hypodensities that are overall stable since CT 06/03/2014 and are favored to represent renal cysts.  Proximal ureters are nondilated.    Bowel: There is a rectal tube present.  Stomach and small bowel unremarkable.  Colon is within normal limits.  No obstruction or inflammatory changes.    Peritoneum: Minimal fluid in the bilateral pericolic gutters.  Mild volume free fluid in the pelvis.  No focal drainable fluid collection.  No pneumoperitoneum.  There is a left lower quadrant surgical drainage catheter with its tip in the left hemiabdomen abutting a loop of small bowel.    Abdominal wall: There are postsurgical and inflammatory changes in the ventral midline abdominal wall with a focal fluid collection in the midline measuring 2.2 x 4.7 x 6.6 cm (AP x TV x CC).  There is mild body wall edema.    Abdominal Adenopathy: None.    Vasculature: No aneurysm.  Scattered calcified and noncalcified atherosclerotic plaque throughout the abdominal aorta and major branch vessels.    Pelvis:    Urinary bladder: Decompressed and contains  a Fontenot catheter.    Female reproductive: Status post hysterectomy.    Pelvis adenopathy: None.    Bones: Postoperative changes of L5-S1 posterior spinal fusion with bilateral pedicle screws.  L5-S1 interbody spacer is present and projects anteriorly outside of the disc space, similar to prior CT.    Miscellaneous: None.   Impression:       In this patient who is status post posterior L5-S1 spinal fusion with subsequent left ureteral trauma, there has been resolution of the large air collection within the anterior paraspinous soft tissues and surrounding the left ureter.  There is persistent left periureteral inflammatory change without a focal drainable fluid collection in the abdomen.  There has been interval placement of a left nephrostomy tube.    Postoperative changes in the ventral abdominal wall with a large focal superficial fluid collection.  This may represent a hematoma/seroma or abscess.    Small volume bilateral pleural effusions with associated compressive atelectasis and bilateral lower lobe consolidation/atelectasis.    Diffuse body wall edema.    Nonspecific wedge-shaped splenic hypodensity in the medial pole, not well seen on prior exams, and favored to represent a small splenic infarct or less likely splenic cyst.    1.4 cm hypodense lesion in the left lobe of the thyroid.  Recommend further nonemergent evaluation with thyroid ultrasound if not already performed.    Several bilateral renal hypodensities that are all stable and are likely cysts.    Hepatomegaly.    Additional stable findings, as above.    This report was flagged in [employer] as abnormal.    Electronically signed by resident: Mk Marr  Date: 05/01/2019  Time: 02:15    Electronically signed by: Weston Stephens MD  Date: 05/01/2019  Time: 04:45   X-Ray Chest 1 View [[id number]] Resulted: 04/30/19 0945   Order Status: Completed Updated: 04/30/19 0947   Narrative:     EXAMINATION:  XR CHEST 1 VIEW    CLINICAL HISTORY:  difficuly weaning  the vent, diuresing daily;    TECHNIQUE:  Single frontal view of the chest was performed.    COMPARISON:  04/29/2019    FINDINGS:  There is a right neck CVP line with tip projected over SVC.  Endotracheal tube is seen with tip above the level of the barb.  NG tube is seen coursing towards the stomach.  Trachea is patent.  Cardiac silhouette appears unchanged.  There is atherosclerosis.  Lung volumes are symmetric.  Increased pulmonary haziness is suggestive of mild pulmonary edema.  No effusion, pneumothorax or free air below the diaphragm.  No acute osseous abnormality.   Impression:       Findings suggestive of mild pulmonary edema which appears improved from prior.      Electronically signed by: David Lo MD  Date: 04/30/2019  Time: 09:45

## 2019-05-06 NOTE — PROGRESS NOTES
[facility]  Infectious Disease  Progress Note    Patient Name: Mariann Huff  MRN: [id number]  Admission Date: 4/20/2019  Length of Stay: 16 days  Attending Physician: Robin Boyd MD  Primary Care Provider: Jasbir Haney MD    Isolation Status: No active isolations  Assessment/Plan:      Sepsis  58 y/o female with HTN, DMII, CAD, NSTEMI, s/p L5-S1 OLIF with NSGY 4/12 c/b intraoperative left ureteral injury and underwent ureteroureteral anastomoses and ureteral stent placement. She was discharged with a correa and MADHURI drain that was removed on 4/16. She was seen by urology and anticipated stent removal in 2-3 months (6/2019).  She presents to ED with AMS and E.coli septicemia found to have air and fluid collection of left anterior prevertebral soft tissue with left ureter involvement. She underwent ex-lap and washout on 4/21. We are consulted for abx recommendations.      Per op note,  There were pocket of purulence noted and evacuated, sent for culture of left retroperitoneum to pole of left kidney. The stent was visible. Posterior to the stent there was a pocket of purulent fluid and exposed hardware along the spinal vertebrae. The tissue along the distal and proximal end of ureter were friable and unhealthy. She underwent left nephrostomy tube placement. The stent was removed and several metal clips were placed on proximal end of the ureteral.      OR cultures with Staph Lugdenensis - not currently covered.  Patient does appear to be having diarrhea - C diff negative but suspect may need restesting if WBC increases and no other source found.  QTc long at 480      Repeat BCXs sent and NGTD.  BAL done but no WBC no organisms seen - NGTD.  Source of fever and leukocytosis unclear but ABD suspected though could be non infectious - drug reaction vs PE/DVT.  CT abdomen with superficial fluid collection - possible seroma and inflammatory change around ureter.  C diff repeated and negative.  Line changes  jonathan cvc - removed.  RUE US shows partially occlusive thrombus in RIJ and proximal subclvian vein - suspected cause of fevers.  CXR clear.      Recommendations:  1. Continue ceftriaxone 2g q24hr and rifampin 300 mg q12hr IV (transition RIF to oral once able) given hardware in place.   2. Rec DC Vancomycin as E coli and Staph Lugdenensis covered by CTX  3. Staph lugdenensis sensitive to Rifampin  4. May need to change abx out of concern for drug reaction and assess for other non infectious causes of fever.  5. Anticipate 6 weeks of CTX and RIF abx from first negative blood cultures (4/23-6/4/19)  followed by oral abx suppression given retained hardware.   6. Weekly ESR, CRP,CBC, CMP faxed to ID dept at [phone]  7. FU 10-14d post DC  8.  Consider reimaging of abd in future if clinically appropriate given prior CT findings     9. Dicussed with primary team as they were present at time of interview.  10. ID will sign off.          Anticipated Disposition: tbd    Thank you for your consult. I will sign off. Please contact us if you have any additional questions.    POLLY Jerome  Infectious Disease  [facility]    Subjective:     Principal Problem:Ureteral transection of left native kidney    HPI: 58 y/o female with HTN, DMII, CAD, NSTEMI, s/p L5-S1 OLIF with NSGY 4/12 sustained intraoperative laceration and underwent ureteroureteral anastomoses and ureteral stent placement. She was discharged with a correa and MADHURI drain that was removed on 4/16. She was seen by urology and anticipated stent removal in 2-3 months (6/2019).  She presents to ED with AMS and sepsis. She was febrile 103 in ED. WBC 5K on admit. Lactate 4.6. Cath UA with 3+leukocytes, >100 WBcs and many bacteria.     MRI: Postsurgical change of L5-S1 posterior spinal fusion and anterior interbody fusion with a 4.4 cm fluid fluid collection in the left anterior prevertebral soft tissues at the level of the L5-S1 disc space.   Findings may represent postoperative seroma or evolving hematoma however, urinoma not excluded.  No definite abscess although correlation is advised.    Irregular flow void involving the right common iliac vein, underlying thrombus not excluded.      CT: air collection within the anterior paraspinous soft tissues through which the left ureter courses. Given that the ureter courses through this, communication of the ureter with this collection is not excluded.  There is a ureteral stent within the left ureter.Punctate foci of air in L5-S1.  2. Air within the left renal collecting system, along the left ureter, and within the urinary bladder, correlation advised.    This was not amendable for drainage by IR so she was taken to OR for washout.     She underwent ex-lap of left ureter and left nephrostomy tube placement 4/21/19.   Per op note,  There were pocket of purulence noted and evacuated, sent for culture of left retroperitoneum to pole of left kidney. The stent was visible. Posterior to the stent there was a pocket of purulent fluid and exposed hardware along the spinal vertebrae. The tissue along the distal and proximal end of ureter were friable and unhealthy. She underwent left nephrostomy tube placement. The stent was removed and several metal clips were placed on proximal end of the ureteral. MADHURI drain was placed into left retroperitoneal.     Blood cultures were are positive for E coli..   Urine cultures +E.coli  OR cultures were positive for Staph Ludenensis  She is was treated with zosyn. She is in the ICU, intubated, sedated    ID had seen the patient and there was concern for hardware involvement given proximity and the patient was placed on ceftriaxone and rifampin with plan for 6 weeks of abx.  ID now reconsulted due to fever and leukocytosis.  Patient denies any abdominal pain , fever, chills or sweats.  Repeat Bblood cultures and urine cultures are no growth.    Interval History: No AEON.  Tmax  100.4, T current 99.3  RUE US show partially occlusive thrombus.  The patient denies any recent fever, chills, or sweats.      Review of Systems   Constitutional: Negative for activity change, chills, diaphoresis and fever.   Respiratory: Negative for cough, shortness of breath and wheezing.    Cardiovascular: Negative for chest pain.   Gastrointestinal: Negative for abdominal pain, constipation, diarrhea, nausea and vomiting.   Genitourinary: Negative for dysuria, frequency and urgency.   Neurological: Negative for dizziness.   Hematological: Does not bruise/bleed easily.     Objective:     Vital Signs (Most Recent):  Temp: 99.3 °F (37.4 °C) (05/06/19 0900)  Pulse: 101 (05/06/19 0915)  Resp: (!) 29 (05/06/19 0915)  BP: (!) 176/84 (05/06/19 0915)  SpO2: 96 % (05/06/19 0915) Vital Signs (24h Range):  Temp:  [99.3 °F (37.4 °C)-100.4 °F (38 °C)] 99.3 °F (37.4 °C)  Pulse:  [] 101  Resp:  [5-41] 29  SpO2:  [89 %-100 %] 96 %  BP: ()/(52-84) 176/84     Weight: 77.2 kg (170 lb 3.1 oz)  Body mass index is 29.21 kg/m².    Estimated Creatinine Clearance: 77.1 mL/min (based on SCr of 0.8 mg/dL).    Physical Exam   Constitutional: She is oriented to person, place, and time. She appears well-developed and well-nourished. No distress.       HENT:   Head: Normocephalic and atraumatic.   Cardiovascular: Normal rate, regular rhythm and normal heart sounds. Exam reveals no gallop and no friction rub.   No murmur heard.  Pulmonary/Chest: Effort normal and breath sounds normal. No respiratory distress. She has no wheezes. She has no rales.   Abdominal: Soft. Bowel sounds are normal. She exhibits no distension and no mass. There is no tenderness. There is no rebound and no guarding.   Musculoskeletal: She exhibits no edema.   Neurological: She is alert and oriented to person, place, and time.   Skin: Skin is warm and dry. She is not diaphoretic.   Psychiatric: She has a normal mood and affect. Her behavior is normal.        Significant Labs:   Blood Culture:   Recent Labs   Lab 04/23/19  0430 04/23/19  0500 04/24/19  0950 04/30/19  0230 04/30/19  0247   LABBLOO No growth after 5 days. No growth after 5 days. No growth after 5 days. No growth after 5 days. No growth after 5 days.     CBC:   Recent Labs   Lab 05/05/19  1653 05/05/19  2156 05/06/19  0409   WBC 11.05 10.28 10.89   HGB 7.2* 7.1* 6.7*   HCT 23.0* 22.5* 22.4*   * 559* 624*     CMP:   Recent Labs   Lab 05/05/19  0412 05/05/19  1706 05/06/19  0409     --  138   K 3.6 3.5 3.9   CL 99  --  100   CO2 31*  --  31*   *  --  167*   BUN 15  --  22*   CREATININE 0.8  --  0.8   CALCIUM 8.5*  --  8.2*   PROT 5.7*  --  6.0   ALBUMIN 1.7*  --  1.7*   BILITOT 1.1*  --  1.1*   ALKPHOS 107  --  100   AST 16  --  19   ALT 19  --  17   ANIONGAP 9  --  7*   EGFRNONAA >60.0  --  >60.0     Respiratory Culture:   Recent Labs   Lab 04/30/19  0900   GSRESP <10 epithelial cells per low power field.  No WBC's  No organisms seen     Urine Culture:   Recent Labs   Lab 04/20/19  1711 04/21/19  0640 04/21/19  2256 04/25/19  0252   LABURIN ESCHERICHIA COLI  >100,000 cfu/ml   No growth No significant growth No growth     Urine Studies:   Recent Labs   Lab 04/25/19  0252   COLORU Argelia   APPEARANCEUA Cloudy*   PHUR 5.0   SPECGRAV 1.020   PROTEINUA 1+*   GLUCUA 3+*   KETONESU 1+*   BILIRUBINUA Negative   OCCULTUA 3+*   NITRITE Negative   LEUKOCYTESUR 3+*   RBCUA 25*   WBCUA 55*   BACTERIA Few*   SQUAMEPITHEL 1   HYALINECASTS 0     Wound Culture:   Recent Labs   Lab 04/21/19  0125   LABAERO STAPHYLOCOCCUS LUGDUNENSIS  Rare       All pertinent labs within the past 24 hours have been reviewed.    Significant Imaging: I have reviewed all pertinent imaging results/findings within the past 24 hours.   X-Ray Chest 1 View [[id number]] Resulted: 05/04/19 0726   Order Status: Completed Updated: 05/04/19 0728   Narrative:     EXAMINATION:  XR CHEST 1 VIEW    CLINICAL HISTORY:  trying to wean  from vent, also, using CXR to guide diuresis;    TECHNIQUE:  Single frontal view of the chest was performed.    COMPARISON:  05/03/2019    FINDINGS:  Tracheostomy catheter noted.  There is mild bilateral interstitial prominence.  No confluent consolidation.  No pleural effusion or pneumothorax.  Cardiomediastinal silhouette is unremarkable.   Impression:       As above.      Electronically signed by: Anders Lindsay MD  Date: 05/04/2019  Time: 07:26   US Upper Extremity Veins Right [[id number]] Resulted: 05/03/19 1842   Order Status: Completed Updated: 05/03/19 1845   Narrative:     EXAMINATION:  US UPPER EXTREMITY VEINS RIGHT    CLINICAL HISTORY:  Right arm swelling;    TECHNIQUE:  Duplex and color flow Doppler evaluation and dynamic compression was performed of the right upper extremity veins.    COMPARISON:  No relevant prior imaging for comparison    FINDINGS:  Central veins: There is partially occlusive thrombus of the distal right internal jugular vein extending into the proximal right subclavian vein.  The axillary vein is patent and free of thrombus.    Arm veins: The brachial, basilic, and cephalic veins are patent and compressible.    Contralateral subclavian/internal jugular veins: The left subclavian and internal jugular veins are patent and free of thrombus.    Other findings: None.   Impression:       Partially occlusive thrombus of the distal right internal jugular vein extending into the proximal right subclavian vein.    COMMUNICATION  This critical result was discovered/received at 1812.  The critical information above was relayed directly by John Obrien M.D. by telephone to Dr. Espino On 05/03/2019 at 1820.    Electronically signed by resident: John Obrien  Date: 05/03/2019  Time: 18:10    Electronically signed by: Edin oL MD  Date: 05/03/2019  Time: 18:42   X-Ray Chest 1 View [[id number]] Resulted: 05/03/19 1048   Order Status: Completed Updated: 05/03/19 1050   Narrative:      EXAMINATION:  XR CHEST 1 VIEW    CLINICAL HISTORY:  trying to wean from vent, also, using CXR to guide diuresis;    TECHNIQUE:  Single frontal view of the chest was performed.    COMPARISON:  05/02/2019, 05:22 hours.    FINDINGS:  Tracheostomy cannula replaces the endotracheal tube cannula tip projects over the airway between the clavicular heads, in good location.    I detect no pneumothorax, pneumomediastinum, pneumoperitoneum or mediastinal shift and no acute pulmonary disease in this patient status post recent tracheostomy.  Pulmonary parenchymal aeration has improved since 05/02/2019 suggesting alterations in ventilatory pressure, volume status or both.   Impression:       Tracheostomy cannula is in good location.  No complication of tracheostomy identified.      Electronically signed by: Fay Loyola MD  Date: 05/03/2019  Time: 10:48   X-Ray Chest 1 View [[id number]] Resulted: 05/02/19 0806   Order Status: Completed Updated: 05/02/19 0809   Narrative:     EXAMINATION:  XR CHEST 1 VIEW    CLINICAL HISTORY:  difficuly weaning the vent, diuresing daily;    TECHNIQUE:  Single frontal view of the chest was performed.    COMPARISON:  05/01/2019    FINDINGS:  Endotracheal tube tip is seen at the level of the clavicles.  NG tube is seen coursing towards the stomach.  Trachea is patent.  Cardiac silhouette is normal in size.  There is atherosclerosis.  There has been interval development of bilateral opacities.  There is no effusion, pneumothorax or free air below the diaphragm.  There is no acute osseous abnormality.   Impression:       Interval development of multifocal bilateral opacities that could represent multifocal infection or pulmonary edema.      Electronically signed by: David Lo MD  Date: 05/02/2019  Time: 08:06   X-Ray Chest 1 View [[id number]] Resulted: 05/01/19 0744   Order Status: Completed Updated: 05/01/19 0747   Narrative:     EXAMINATION:  XR CHEST 1 VIEW    CLINICAL HISTORY:  difficuly  weaning the vent, diuresing daily;    TECHNIQUE:  Single frontal view of the chest was performed.    COMPARISON:  April 30, 2019.    FINDINGS:  ET tube NG tube and monitoring leads all remain.  Heart size pulmonary vessels are similar.  Lungs are hypoaerated.  Accentuation of the interstitial markings.  No pneumothorax.   Impression:       No detrimental change.  Right central venous catheter has been removed.      Electronically signed by: Mk Reilly MD  Date: 05/01/2019  Time: 07:44   CT Chest Abdoment Pelvis With Contrast [[id number]] (Abnormal) Resulted: 05/01/19 0445   Order Status: Completed Updated: 05/01/19 0448   Narrative:     EXAMINATION:  CT CHEST ABDOMEN PELVIS WITH CONTRAST (XPD)    CLINICAL HISTORY:  concern for intraabdominal abscess s/p spinal sugery complicated by ureteral injury;    TECHNIQUE:  Low dose axial images, sagittal and coronal reformations were obtained from the thoracic inlet to the pubic symphysis following the IV administration of 100 mL of Omnipaque 350 .  Oral contrast was not administered.    COMPARISON:  Chest radiograph 04/30/2019.  CT abdomen pelvis 04/20/2019. CT abdomen pelvis 06/03/2014.    FINDINGS:  There is an ET tube with its tip above the barb.  Enteric tube is present with its tip in the mid pylorus.    Chest:    Base of the neck: Left thyroid lobe 1.4 cm hypodense lesion.  Recommend nonemergent evaluation with thyroid ultrasound if not already performed.    Heart and great vessels: Heart is normal in size with minimal pericardial fluid.  Multivessel coronary atherosclerosis.  Aorta is normal in course and caliber.    Adenopathy: Few scattered mediastinal lymph nodes, none meeting enlargement by CT criteria.    Esophagus: Within normal limits.    Airways: ET tube present.    Lungs: Small volume bilateral pleural effusions with associated compressive atelectasis and consolidation in the bilateral lower lobes.    Abdomen:    Liver: Liver is enlarged measuring 22 cm  in the craniocaudal direction.  Diffusely decreased attenuation of the hepatic parenchyma, consistent with steatosis.  No focal hepatic lesion.  Portal vein is patent.    Gallbladder: No calcified gallstones.    Biliary system: No intra or extrahepatic biliary ductal dilatation.    Spleen: Hypoattenuating 2.1 cm lesion in the inferior medial aspect of the spleen is not well visualized on prior CT exams, likely an infarct.    Pancreas: No mass or peripancreatic fat stranding.    Adrenals: Within normal limits.    Kidneys: Normal in size and position. There is a left-sided nephrostomy tube present.  Mild fat stranding around the proximal/mid left ureter with possible surgical clip along its mid course.  Distal left ureter is not well visualized.  There has been resolution of the previously seen periureteral free air.  No hydronephrosis.  There are several bilateral renal hypodensities that are overall stable since CT 06/03/2014 and are favored to represent renal cysts.  Proximal ureters are nondilated.    Bowel: There is a rectal tube present.  Stomach and small bowel unremarkable.  Colon is within normal limits.  No obstruction or inflammatory changes.    Peritoneum: Minimal fluid in the bilateral pericolic gutters.  Mild volume free fluid in the pelvis.  No focal drainable fluid collection.  No pneumoperitoneum.  There is a left lower quadrant surgical drainage catheter with its tip in the left hemiabdomen abutting a loop of small bowel.    Abdominal wall: There are postsurgical and inflammatory changes in the ventral midline abdominal wall with a focal fluid collection in the midline measuring 2.2 x 4.7 x 6.6 cm (AP x TV x CC).  There is mild body wall edema.    Abdominal Adenopathy: None.    Vasculature: No aneurysm.  Scattered calcified and noncalcified atherosclerotic plaque throughout the abdominal aorta and major branch vessels.    Pelvis:    Urinary bladder: Decompressed and contains a Fontenot  catheter.    Female reproductive: Status post hysterectomy.    Pelvis adenopathy: None.    Bones: Postoperative changes of L5-S1 posterior spinal fusion with bilateral pedicle screws.  L5-S1 interbody spacer is present and projects anteriorly outside of the disc space, similar to prior CT.    Miscellaneous: None.   Impression:       In this patient who is status post posterior L5-S1 spinal fusion with subsequent left ureteral trauma, there has been resolution of the large air collection within the anterior paraspinous soft tissues and surrounding the left ureter.  There is persistent left periureteral inflammatory change without a focal drainable fluid collection in the abdomen.  There has been interval placement of a left nephrostomy tube.    Postoperative changes in the ventral abdominal wall with a large focal superficial fluid collection.  This may represent a hematoma/seroma or abscess.    Small volume bilateral pleural effusions with associated compressive atelectasis and bilateral lower lobe consolidation/atelectasis.    Diffuse body wall edema.    Nonspecific wedge-shaped splenic hypodensity in the medial pole, not well seen on prior exams, and favored to represent a small splenic infarct or less likely splenic cyst.    1.4 cm hypodense lesion in the left lobe of the thyroid.  Recommend further nonemergent evaluation with thyroid ultrasound if not already performed.    Several bilateral renal hypodensities that are all stable and are likely cysts.    Hepatomegaly.    Additional stable findings, as above.    This report was flagged in Epic as abnormal.    Electronically signed by resident: Mk Marr  Date: 05/01/2019  Time: 02:15    Electronically signed by: Weston Stephens MD  Date: 05/01/2019  Time: 04:45   X-Ray Chest 1 View [[id number]] Resulted: 04/30/19 0945   Order Status: Completed Updated: 04/30/19 0947   Narrative:     EXAMINATION:  XR CHEST 1 VIEW    CLINICAL HISTORY:  difficuly weaning the vent,  diuresing daily;    TECHNIQUE:  Single frontal view of the chest was performed.    COMPARISON:  04/29/2019    FINDINGS:  There is a right neck CVP line with tip projected over SVC.  Endotracheal tube is seen with tip above the level of the barb.  NG tube is seen coursing towards the stomach.  Trachea is patent.  Cardiac silhouette appears unchanged.  There is atherosclerosis.  Lung volumes are symmetric.  Increased pulmonary haziness is suggestive of mild pulmonary edema.  No effusion, pneumothorax or free air below the diaphragm.  No acute osseous abnormality.   Impression:       Findings suggestive of mild pulmonary edema which appears improved from prior.      Electronically signed by: David Lo MD  Date: 04/30/2019  Time: 09:45

## 2019-05-06 NOTE — SUBJECTIVE & OBJECTIVE
Interval History: No acute events. Breathing comfortably on trach collar. HDS without pressors.  cc from L nephrostomy, 205 from urethral catheter, 50 cc from MADHURI (SS). Cr stable. Hgb decreased 7.1 -> 6.7, patient currently being transfused.     Review of Systems  Objective:     Temp:  [99.3 °F (37.4 °C)-100.4 °F (38 °C)] 99.6 °F (37.6 °C)  Pulse:  [] 96  Resp:  [5-37] 26  SpO2:  [89 %-100 %] 98 %  BP: ()/(52-80) 134/62     Body mass index is 29.21 kg/m².           Drains     Drain                 Closed/Suction Drain 04/21/19 0300 LLQ 15 days         Nephrostomy 04/21/19 0629 Left 8 Fr. 15 days         Urethral Catheter 04/20/19 1711 Latex 16 Fr. 15 days         Gastrostomy/Enterostomy 05/02/19 1134 Percutaneous endoscopic gastrostomy (PEG) LUQ decompression;feeding 3 days                Physical Exam   Constitutional: She appears well-developed and well-nourished. No distress.   HENT:   Head: Normocephalic and atraumatic.   Neck: No JVD present.   Cardiovascular: Normal rate and regular rhythm.    Pulmonary/Chest:   On trach collar   Abdominal: Soft. She exhibits no distension. There is no rebound and no guarding.   Incision c/d/i   MADHURI drain with ss output  G-tube   Genitourinary:   Genitourinary Comments: Fontenot draining clear yellow  Left neph tube with clear yellow urine   Neurological: She is alert.   Skin: Skin is warm and dry. She is not diaphoretic. No pallor.         Significant Labs:    BMP:  Recent Labs   Lab 05/04/19  0438  05/05/19  0412 05/05/19  1706 05/06/19  0409     --  139  --  138   K 3.1*   < > 3.6 3.5 3.9   CL 99  --  99  --  100   CO2 31*  --  31*  --  31*   BUN 17  --  15  --  22*   CREATININE 0.7  --  0.8  --  0.8   CALCIUM 9.2  --  8.5*  --  8.2*    < > = values in this interval not displayed.       CBC:   Recent Labs   Lab 05/05/19  1653 05/05/19  2156 05/06/19  0409   WBC 11.05 10.28 10.89   HGB 7.2* 7.1* 6.7*   HCT 23.0* 22.5* 22.4*   * 559* 624*      Significant Imaging:  All pertinent imaging results/findings from the past 24 hours have been reviewed.

## 2019-05-06 NOTE — PROGRESS NOTES
[facility]  Urology  Progress Note    Patient Name: Mariann Huff  MRN: [id number]  Admission Date: 4/20/2019  Hospital Length of Stay: 16 days  Code Status: Full Code   Attending Provider: Robin Boyd MD   Primary Care Physician: Jasbir Haney MD    Subjective:     HPI:  Mariann Huff is a 59 y.o. female with history of HTN, type 2 diabetes mellitus, CAD, NSTEMI, and back pain who [place] [facility] [place] status and sepsis. She underwent L5-S1 OLIF with NSGY on 4/12 and had intraoperative left ureteral injury with ureteroureteral anastomosis and ureteral stent placement. She did well initially and had correa and MADHURI drain removed on 4/16. She began having chills and altered mental status 2 days ago and this has progressively worsened. No complaints of pain.     She is altered and HPI is limited. In the ED she is febrile to 103 and tachycardic and pressures are low normal. WBC is 5, creatinine is 3.6 baseline 1.0, lactate is 4.6. Cath UA concerning for infection, 3+ LE, >100 WBCs, and many bacteria on microscopy.    CT and MRI abdomen both show air with minimal fluid near the surgical site with left ureter coursing through. There is air throughout the proximal collecting system which is decompressed with JJ ureteral stent in good position.    Taken for ex lap, ligation of left ureter and left neph tube placement on 4/21/19.    Interval History: No acute events. Breathing comfortably on trach collar. HDS without pressors.  cc from L nephrostomy, 205 from urethral catheter, 50 cc from MADHURI (SS). Cr stable. Hgb decreased 7.1 -> 6.7, patient currently being transfused.     Review of Systems  Objective:     Temp:  [99.3 °F (37.4 °C)-100.4 °F (38 °C)] 99.6 °F (37.6 °C)  Pulse:  [] 96  Resp:  [5-37] 26  SpO2:  [89 %-100 %] 98 %  BP: ()/(52-80) 134/62     Body mass index is 29.21 kg/m².           Drains     Drain                 Closed/Suction Drain 04/21/19 0300 LLQ 15 days          Nephrostomy 04/21/19 0629 Left 8 Fr. 15 days         Urethral Catheter 04/20/19 1711 Latex 16 Fr. 15 days         Gastrostomy/Enterostomy 05/02/19 1134 Percutaneous endoscopic gastrostomy (PEG) LUQ decompression;feeding 3 days                Physical Exam   Constitutional: She appears well-developed and well-nourished. No distress.   HENT:   Head: Normocephalic and atraumatic.   Neck: No JVD present.   Cardiovascular: Normal rate and regular rhythm.    Pulmonary/Chest:   On trach collar   Abdominal: Soft. She exhibits no distension. There is no rebound and no guarding.   Incision c/d/i   MADHURI drain with ss output  G-tube   Genitourinary:   Genitourinary Comments: Correa draining clear yellow  Left neph tube with clear yellow urine   Neurological: She is alert.   Skin: Skin is warm and dry. She is not diaphoretic. No pallor.         Significant Labs:    BMP:  Recent Labs   Lab 05/04/19  0438  05/05/19  0412 05/05/19  1706 05/06/19  0409     --  139  --  138   K 3.1*   < > 3.6 3.5 3.9   CL 99  --  99  --  100   CO2 31*  --  31*  --  31*   BUN 17  --  15  --  22*   CREATININE 0.7  --  0.8  --  0.8   CALCIUM 9.2  --  8.5*  --  8.2*    < > = values in this interval not displayed.       CBC:   Recent Labs   Lab 05/05/19  1653 05/05/19  2156 05/06/19  0409   WBC 11.05 10.28 10.89   HGB 7.2* 7.1* 6.7*   HCT 23.0* 22.5* 22.4*   * 559* 624*     Significant Imaging:  All pertinent imaging results/findings from the past 24 hours have been reviewed.                  Assessment/Plan:     * Ureteral transection of left native kidney  Mariann Huff is a 59 y.o. female s/p left ureteral injury on 4/12, presented with fever, AMS, and intraabdominal abscess, taken for washout and ligation of left ureter with neph tube placement on 4/21, Trach/PEG 5/2.    - tube feeds per SICU  - Trach per SICU  - Rocephin, Rifampin, Vancomycin  - Maintain correa, neph tube, and MADHURI drain  - continue ICU care  - transfusing 1U pRBCs;   may consult GI if she continues to have bloody BMs    Sepsis  As above        VTE Risk Mitigation (From admission, onward)        Ordered     heparin 25,000 units in dextrose 5% 250 mL (100 units/mL) infusion HIGH INTENSITY nomogram - [facility]  Continuous      05/03/19 2155     Place sequential compression device  Until discontinued      04/20/19 2108     IP VTE HIGH RISK PATIENT  Once      04/20/19 2108          Mook Ferguson MD  Urology  [facility]

## 2019-05-07 PROBLEM — K62.5 BRBPR (BRIGHT RED BLOOD PER RECTUM): Status: ACTIVE | Noted: 2019-05-07

## 2019-05-07 LAB
ALBUMIN SERPL BCP-MCNC: 1.9 G/DL (ref 3.5–5.2)
ALP SERPL-CCNC: 99 U/L (ref 55–135)
ALT SERPL W/O P-5'-P-CCNC: 18 U/L (ref 10–44)
ANION GAP SERPL CALC-SCNC: 7 MMOL/L (ref 8–16)
APTT BLDCRRT: 24.5 SEC (ref 21–32)
APTT BLDCRRT: 25.4 SEC (ref 21–32)
APTT BLDCRRT: 25.4 SEC (ref 21–32)
APTT BLDCRRT: 25.6 SEC (ref 21–32)
AST SERPL-CCNC: 22 U/L (ref 10–40)
BASOPHILS # BLD AUTO: 0.06 K/UL (ref 0–0.2)
BASOPHILS # BLD AUTO: 0.07 K/UL (ref 0–0.2)
BASOPHILS # BLD AUTO: 0.08 K/UL (ref 0–0.2)
BASOPHILS NFR BLD: 0.5 % (ref 0–1.9)
BASOPHILS NFR BLD: 0.6 % (ref 0–1.9)
BASOPHILS NFR BLD: 0.6 % (ref 0–1.9)
BILIRUB SERPL-MCNC: 0.8 MG/DL (ref 0.1–1)
BUN SERPL-MCNC: 26 MG/DL (ref 6–20)
CALCIUM SERPL-MCNC: 8.5 MG/DL (ref 8.7–10.5)
CHLORIDE SERPL-SCNC: 101 MMOL/L (ref 95–110)
CO2 SERPL-SCNC: 30 MMOL/L (ref 23–29)
CREAT SERPL-MCNC: 1 MG/DL (ref 0.5–1.4)
DIFFERENTIAL METHOD: ABNORMAL
EOSINOPHIL # BLD AUTO: 0.6 K/UL (ref 0–0.5)
EOSINOPHIL NFR BLD: 4.8 % (ref 0–8)
EOSINOPHIL NFR BLD: 5.2 % (ref 0–8)
EOSINOPHIL NFR BLD: 5.5 % (ref 0–8)
ERYTHROCYTE [DISTWIDTH] IN BLOOD BY AUTOMATED COUNT: 13.4 % (ref 11.5–14.5)
ERYTHROCYTE [DISTWIDTH] IN BLOOD BY AUTOMATED COUNT: 13.5 % (ref 11.5–14.5)
ERYTHROCYTE [DISTWIDTH] IN BLOOD BY AUTOMATED COUNT: 13.7 % (ref 11.5–14.5)
EST. GFR  (AFRICAN AMERICAN): >60 ML/MIN/1.73 M^2
EST. GFR  (NON AFRICAN AMERICAN): >60 ML/MIN/1.73 M^2
GLUCOSE SERPL-MCNC: 198 MG/DL (ref 70–110)
HCT VFR BLD AUTO: 22 % (ref 37–48.5)
HCT VFR BLD AUTO: 23.3 % (ref 37–48.5)
HCT VFR BLD AUTO: 23.6 % (ref 37–48.5)
HGB BLD-MCNC: 6.7 G/DL (ref 12–16)
HGB BLD-MCNC: 7.1 G/DL (ref 12–16)
HGB BLD-MCNC: 7.2 G/DL (ref 12–16)
IMM GRANULOCYTES # BLD AUTO: 0.13 K/UL (ref 0–0.04)
IMM GRANULOCYTES # BLD AUTO: 0.14 K/UL (ref 0–0.04)
IMM GRANULOCYTES # BLD AUTO: 0.15 K/UL (ref 0–0.04)
IMM GRANULOCYTES NFR BLD AUTO: 1.2 % (ref 0–0.5)
INR PPP: 1 (ref 0.8–1.2)
LYMPHOCYTES # BLD AUTO: 1.4 K/UL (ref 1–4.8)
LYMPHOCYTES # BLD AUTO: 1.6 K/UL (ref 1–4.8)
LYMPHOCYTES # BLD AUTO: 1.9 K/UL (ref 1–4.8)
LYMPHOCYTES NFR BLD: 12.4 % (ref 18–48)
LYMPHOCYTES NFR BLD: 12.5 % (ref 18–48)
LYMPHOCYTES NFR BLD: 15.9 % (ref 18–48)
MAGNESIUM SERPL-MCNC: 2.1 MG/DL (ref 1.6–2.6)
MCH RBC QN AUTO: 26.9 PG (ref 27–31)
MCH RBC QN AUTO: 27 PG (ref 27–31)
MCH RBC QN AUTO: 27.2 PG (ref 27–31)
MCHC RBC AUTO-ENTMCNC: 30.5 G/DL (ref 32–36)
MCV RBC AUTO: 88 FL (ref 82–98)
MCV RBC AUTO: 88 FL (ref 82–98)
MCV RBC AUTO: 89 FL (ref 82–98)
MONOCYTES # BLD AUTO: 1.1 K/UL (ref 0.3–1)
MONOCYTES NFR BLD: 9 % (ref 4–15)
MONOCYTES NFR BLD: 9.4 % (ref 4–15)
MONOCYTES NFR BLD: 9.4 % (ref 4–15)
NEUTROPHILS # BLD AUTO: 7.8 K/UL (ref 1.8–7.7)
NEUTROPHILS # BLD AUTO: 8 K/UL (ref 1.8–7.7)
NEUTROPHILS # BLD AUTO: 9 K/UL (ref 1.8–7.7)
NEUTROPHILS NFR BLD: 67.4 % (ref 38–73)
NEUTROPHILS NFR BLD: 71.2 % (ref 38–73)
NEUTROPHILS NFR BLD: 72 % (ref 38–73)
NRBC BLD-RTO: 0 /100 WBC
PHOSPHATE SERPL-MCNC: 2.2 MG/DL (ref 2.7–4.5)
PLATELET # BLD AUTO: 565 K/UL (ref 150–350)
PLATELET # BLD AUTO: 583 K/UL (ref 150–350)
PLATELET # BLD AUTO: 630 K/UL (ref 150–350)
PMV BLD AUTO: 10.6 FL (ref 9.2–12.9)
PMV BLD AUTO: 10.7 FL (ref 9.2–12.9)
PMV BLD AUTO: 10.8 FL (ref 9.2–12.9)
POCT GLUCOSE: 142 MG/DL (ref 70–110)
POCT GLUCOSE: 149 MG/DL (ref 70–110)
POCT GLUCOSE: 155 MG/DL (ref 70–110)
POCT GLUCOSE: 161 MG/DL (ref 70–110)
POCT GLUCOSE: 182 MG/DL (ref 70–110)
POCT GLUCOSE: 194 MG/DL (ref 70–110)
POCT GLUCOSE: 229 MG/DL (ref 70–110)
POTASSIUM SERPL-SCNC: 4.2 MMOL/L (ref 3.5–5.1)
PROT SERPL-MCNC: 6.3 G/DL (ref 6–8.4)
PROTHROMBIN TIME: 10.3 SEC (ref 9–12.5)
RBC # BLD AUTO: 2.46 M/UL (ref 4–5.4)
RBC # BLD AUTO: 2.64 M/UL (ref 4–5.4)
RBC # BLD AUTO: 2.67 M/UL (ref 4–5.4)
SODIUM SERPL-SCNC: 138 MMOL/L (ref 136–145)
WBC # BLD AUTO: 11.26 K/UL (ref 3.9–12.7)
WBC # BLD AUTO: 11.62 K/UL (ref 3.9–12.7)
WBC # BLD AUTO: 12.53 K/UL (ref 3.9–12.7)

## 2019-05-07 PROCEDURE — 99232 PR SUBSEQUENT HOSPITAL CARE,LEVL II: ICD-10-PCS | Mod: ,,, | Performed by: NURSE PRACTITIONER

## 2019-05-07 PROCEDURE — 76937 US GUIDE VASCULAR ACCESS: CPT

## 2019-05-07 PROCEDURE — 99232 SBSQ HOSP IP/OBS MODERATE 35: CPT | Mod: GC,,, | Performed by: SURGERY

## 2019-05-07 PROCEDURE — 97535 SELF CARE MNGMENT TRAINING: CPT

## 2019-05-07 PROCEDURE — 63600175 PHARM REV CODE 636 W HCPCS: Performed by: STUDENT IN AN ORGANIZED HEALTH CARE EDUCATION/TRAINING PROGRAM

## 2019-05-07 PROCEDURE — 94761 N-INVAS EAR/PLS OXIMETRY MLT: CPT

## 2019-05-07 PROCEDURE — 25000003 PHARM REV CODE 250: Performed by: PHYSICIAN ASSISTANT

## 2019-05-07 PROCEDURE — 80053 COMPREHEN METABOLIC PANEL: CPT

## 2019-05-07 PROCEDURE — C1751 CATH, INF, PER/CENT/MIDLINE: HCPCS

## 2019-05-07 PROCEDURE — 84100 ASSAY OF PHOSPHORUS: CPT

## 2019-05-07 PROCEDURE — 99900026 HC AIRWAY MAINTENANCE (STAT)

## 2019-05-07 PROCEDURE — 36410 VNPNXR 3YR/> PHY/QHP DX/THER: CPT

## 2019-05-07 PROCEDURE — 99232 PR SUBSEQUENT HOSPITAL CARE,LEVL II: ICD-10-PCS | Mod: GC,,, | Performed by: SURGERY

## 2019-05-07 PROCEDURE — 99232 SBSQ HOSP IP/OBS MODERATE 35: CPT | Mod: ,,, | Performed by: NURSE PRACTITIONER

## 2019-05-07 PROCEDURE — 97530 THERAPEUTIC ACTIVITIES: CPT

## 2019-05-07 PROCEDURE — 25000003 PHARM REV CODE 250: Performed by: STUDENT IN AN ORGANIZED HEALTH CARE EDUCATION/TRAINING PROGRAM

## 2019-05-07 PROCEDURE — 27000221 HC OXYGEN, UP TO 24 HOURS

## 2019-05-07 PROCEDURE — 85610 PROTHROMBIN TIME: CPT

## 2019-05-07 PROCEDURE — 85730 THROMBOPLASTIN TIME PARTIAL: CPT | Mod: 91

## 2019-05-07 PROCEDURE — 99900035 HC TECH TIME PER 15 MIN (STAT)

## 2019-05-07 PROCEDURE — 97116 GAIT TRAINING THERAPY: CPT

## 2019-05-07 PROCEDURE — 63600175 PHARM REV CODE 636 W HCPCS: Performed by: PHYSICIAN ASSISTANT

## 2019-05-07 PROCEDURE — C9113 INJ PANTOPRAZOLE SODIUM, VIA: HCPCS | Performed by: STUDENT IN AN ORGANIZED HEALTH CARE EDUCATION/TRAINING PROGRAM

## 2019-05-07 PROCEDURE — 85025 COMPLETE CBC W/AUTO DIFF WBC: CPT | Mod: 91

## 2019-05-07 PROCEDURE — 83735 ASSAY OF MAGNESIUM: CPT

## 2019-05-07 PROCEDURE — 85730 THROMBOPLASTIN TIME PARTIAL: CPT

## 2019-05-07 PROCEDURE — 20000000 HC ICU ROOM

## 2019-05-07 RX ORDER — HEPARIN SODIUM,PORCINE/D5W 25000/250
18 INTRAVENOUS SOLUTION INTRAVENOUS CONTINUOUS
Status: DISCONTINUED | OUTPATIENT
Start: 2019-05-07 | End: 2019-05-07

## 2019-05-07 RX ORDER — OXYCODONE HYDROCHLORIDE 10 MG/1
10 TABLET ORAL
Status: DISCONTINUED | OUTPATIENT
Start: 2019-05-07 | End: 2019-05-08

## 2019-05-07 RX ORDER — MORPHINE SULFATE 2 MG/ML
2 INJECTION, SOLUTION INTRAMUSCULAR; INTRAVENOUS
Status: DISCONTINUED | OUTPATIENT
Start: 2019-05-07 | End: 2019-05-08

## 2019-05-07 RX ORDER — SODIUM,POTASSIUM PHOSPHATES 280-250MG
2 POWDER IN PACKET (EA) ORAL ONCE
Status: COMPLETED | OUTPATIENT
Start: 2019-05-07 | End: 2019-05-07

## 2019-05-07 RX ORDER — PANTOPRAZOLE SODIUM 40 MG/1
40 TABLET, DELAYED RELEASE ORAL DAILY
Status: DISCONTINUED | OUTPATIENT
Start: 2019-05-08 | End: 2019-05-07

## 2019-05-07 RX ORDER — INSULIN ASPART 100 [IU]/ML
0-5 INJECTION, SOLUTION INTRAVENOUS; SUBCUTANEOUS
Status: DISCONTINUED | OUTPATIENT
Start: 2019-05-07 | End: 2019-05-08

## 2019-05-07 RX ORDER — ACETAMINOPHEN 325 MG/1
650 TABLET ORAL EVERY 6 HOURS PRN
Status: DISCONTINUED | OUTPATIENT
Start: 2019-05-07 | End: 2019-06-05 | Stop reason: HOSPADM

## 2019-05-07 RX ORDER — HEPARIN SODIUM 10000 [USP'U]/100ML
50 INJECTION, SOLUTION INTRAVENOUS CONTINUOUS
Status: DISCONTINUED | OUTPATIENT
Start: 2019-05-07 | End: 2019-05-07

## 2019-05-07 RX ORDER — OXYCODONE HYDROCHLORIDE 5 MG/1
5 TABLET ORAL
Status: DISCONTINUED | OUTPATIENT
Start: 2019-05-07 | End: 2019-05-08

## 2019-05-07 RX ORDER — PANTOPRAZOLE SODIUM 40 MG/1
40 FOR SUSPENSION ORAL DAILY
Status: DISCONTINUED | OUTPATIENT
Start: 2019-05-08 | End: 2019-05-08

## 2019-05-07 RX ORDER — HYDROCODONE BITARTRATE AND ACETAMINOPHEN 500; 5 MG/1; MG/1
TABLET ORAL
Status: DISCONTINUED | OUTPATIENT
Start: 2019-05-08 | End: 2019-05-08

## 2019-05-07 RX ORDER — HEPARIN SODIUM 10000 [USP'U]/100ML
75 INJECTION, SOLUTION INTRAVENOUS CONTINUOUS
Status: DISCONTINUED | OUTPATIENT
Start: 2019-05-08 | End: 2019-05-08

## 2019-05-07 RX ADMIN — CEFTRIAXONE 2 G: 2 INJECTION, SOLUTION INTRAVENOUS at 05:05

## 2019-05-07 RX ADMIN — MORPHINE SULFATE 2 MG: 2 INJECTION, SOLUTION INTRAMUSCULAR; INTRAVENOUS at 09:05

## 2019-05-07 RX ADMIN — MORPHINE SULFATE 2 MG: 2 INJECTION, SOLUTION INTRAMUSCULAR; INTRAVENOUS at 11:05

## 2019-05-07 RX ADMIN — INSULIN ASPART 1 UNITS: 100 INJECTION, SOLUTION INTRAVENOUS; SUBCUTANEOUS at 07:05

## 2019-05-07 RX ADMIN — INSULIN ASPART 1 UNITS: 100 INJECTION, SOLUTION INTRAVENOUS; SUBCUTANEOUS at 01:05

## 2019-05-07 RX ADMIN — ACETAMINOPHEN 650 MG: 325 TABLET ORAL at 07:05

## 2019-05-07 RX ADMIN — RAMELTEON 8 MG: 8 TABLET, FILM COATED ORAL at 07:05

## 2019-05-07 RX ADMIN — HEPARIN SODIUM AND DEXTROSE 50 UNITS/HR: 10000; 5 INJECTION INTRAVENOUS at 06:05

## 2019-05-07 RX ADMIN — NYSTATIN AND TRIAMCINOLONE ACETONIDE: 100000; 1 CREAM TOPICAL at 09:05

## 2019-05-07 RX ADMIN — POTASSIUM & SODIUM PHOSPHATES POWDER PACK 280-160-250 MG 2 PACKET: 280-160-250 PACK at 09:05

## 2019-05-07 RX ADMIN — PANTOPRAZOLE SODIUM 40 MG: 40 INJECTION, POWDER, LYOPHILIZED, FOR SOLUTION INTRAVENOUS at 09:05

## 2019-05-07 RX ADMIN — INSULIN ASPART 2 UNITS: 100 INJECTION, SOLUTION INTRAVENOUS; SUBCUTANEOUS at 03:05

## 2019-05-07 RX ADMIN — RIFAMPIN 300 MG: 600 INJECTION, POWDER, LYOPHILIZED, FOR SOLUTION INTRAVENOUS at 03:05

## 2019-05-07 RX ADMIN — HYDROMORPHONE HYDROCHLORIDE 1 MG: 1 INJECTION, SOLUTION INTRAMUSCULAR; INTRAVENOUS; SUBCUTANEOUS at 12:05

## 2019-05-07 RX ADMIN — OXYCODONE HYDROCHLORIDE 10 MG: 10 TABLET ORAL at 07:05

## 2019-05-07 NOTE — PT/OT/SLP PROGRESS
Peewee Clarke, OT   Occupational Therapist   Occupational Therapy   PT/OT/SLP Progress   Signed                           Occupational Therapy   Co-Treatment     Name: Ila Castro  MRN: [id number]  Admitting Diagnosis:  SVC syndrome        Recommendations:      Discharge Recommendations: home with home health  Discharge Equipment Recommendations:  none  Barriers to discharge:  None     Assessment:      Ila Castro is a 47 y.o. female with a medical diagnosis of SVC syndrome.  She presents with impairments listed below. Pt elva well to tolerate and participate in session. Pt displayed global deconditioning requiring increased assist for ADLs and mobility at this time. Pt would benefit from skilled OT services to improve independence and overall occupational functioning.      Performance deficits affecting function are weakness, impaired endurance, impaired self care skills, impaired functional mobilty, gait instability, impaired balance, decreased lower extremity function, impaired cardiopulmonary response to activity, orthopedic precautions.      Rehab Prognosis:  Good; patient would benefit from acute skilled OT services to address these deficits and reach maximum level of function.        Plan:      Patient to be seen 3 x/week to address the above listed problems via self-care/home management, therapeutic activities, therapeutic exercises  ? Plan of Care Expires: 06/01/19  ? Plan of Care Reviewed with: patient     Subjective     Pain/Comfort:  · Pain Rating 1: 0/10  · Pain Rating Post-Intervention 1: 0/10     Objective:      Communicated with: RN prior to session.  Patient found HOB elevated with telemetry, pulse ox (continuous), oxygen, peripheral IV upon OT entry to room.     General Precautions: Standard, fall   Orthopedic Precautions:N/A   Braces: N/A      Occupational Performance:      Bed Mobility:    · Patient completed Scooting/Bridging with minimum assistance  · Patient completed Supine  to Sit with maximal assistance      Functional Mobility/Transfers:  · Patient completed Sit <> Stand Transfer with maximal assistance and of 2 persons  with  rolling walker   · Patient completed Bed <> Chair Transfer using Step Transfer technique with minimum assistance and of 2  persons with rolling walker  · Functional Mobility: Pt ambulated ~8 ft at min a w/ RW.      Activities of Daily Living:  · Upper Body Dressing: maximal assistance donned LSO while seated EOB  · Lower Body Dressing: maximal assistance donned socks while seated EOB utilizing figure 4 position  · Toileting: maximal assistance rebecca-care while standing        [facility] 6 Click ADL: 16     Treatment & Education:  Pt educated on POC.      Patient left up in chair with all lines intact, call button in reach and RN notified     GOALS:       Multidisciplinary Problems           Occupational Therapy Goals                 Problem: Occupational Therapy Goal      Goal Priority Disciplines Outcome Interventions   Occupational Therapy Goal      OT, PT/OT Ongoing (interventions implemented as appropriate)     Description:  Goals to be met by: 5/16/19      Patient will increase functional independence with ADLs by performing:    UE Dressing with Set-up Assistance.  LE Dressing with Maximum Assistance and Assistive Devices as needed.  Grooming while standing with Minimal Assistance.  Toileting from bedside commode with Minimal Assistance for hygiene and clothing management.   Sitting at edge of bed x10 minutes with Supervision.  Rolling to Bilateral with Minimal Assistance.   Supine to sit with Minimal Assistance.  Toilet transfer to bedside commode with Minimal Assistance.                                  Time Tracking:      OT Date of Treatment: 05/07/19  OT Start Time: 1048  OT Stop Time: 1112  OT Total Time (min): 24 min     Billable Minutes:Self Care/Home Management 24 minutes     Peewee Clarke, SRAVANTHI  5/7/2019

## 2019-05-07 NOTE — PROGRESS NOTES
Therapy with vancomycin was discontinued by the provider.  Pharmacy will sign off, please re-consult as needed.    Marilou Jones, PharmD, BCCCP  [phone]

## 2019-05-07 NOTE — PLAN OF CARE
Problem: Physical Therapy Goal  Goal: Physical Therapy Goal  Goals to be met by: 19    Patient will increase functional independence with mobility by performin. Supine to sit with Moderate Assistance -not met  2. Sit to stand transfer with Minimal Assistance -not met  3. Gait  x 30 feet with Contact Guard Assistance using Rolling Walker. -not met  4. Lower extremity exercise program x15 reps , with assistance as needed-not met     Goals remain appropriate. Cathy Taylor, PT 2019

## 2019-05-07 NOTE — ASSESSMENT & PLAN NOTE
Elizaamanda Huff is a 59 y.o. female s/p left ureteral injury on 4/12, presented with fever, AMS, and intraabdominal abscess, taken for washout and ligation of left ureter with neph tube placement on 4/21, Trach/PEG 5/2.    - tube feeds per SICU  - Trach per SICU  - Rocephin, Rifampin, Vancomycin  - Maintain correa, neph tube, and MADHURI drain  - Urine output adequate   - continue ICU care  - Bloody bowel movements have slowed however H/H continues to slowly trend down

## 2019-05-07 NOTE — HPI
Ms. Huff is a 60 yo F with PMH of HTN, DM II, CAD, NSTEMI, s/p L5-S1 OLIF with NSGY 4/12 c/b intraoperative left ureteral injury and underwent ureteroureteral anastomoses and ureteral stent placement complicated by sepsis due to E.coli septicemia now s/p ex lap on 4/21 and washout complicated by respiratory failure requiring trach and PEG now on trach collar who the past 24 hours has been having blood per rectum every time she has a BM.     Per nurse and chart review patient had 2 BM yesterday with bright red blood as well as clots, her H&H drop from 6.7-7.7 this morning was 7.1. Today she had as well one bowel movement with bright red blood and one other time when she past some blood clots with no stool. She was on a heparin gtt that was stopped yesterday for a RUQ DVT and she had a rectal tube in place from 4/21 to 5/4.     Patient and  states she had a colonscopy recently, only thing I see in file is an EGD that shows a Schatzki ring back in 2016.

## 2019-05-07 NOTE — PLAN OF CARE
Pt VSS at this time, 5 L 28% trach collar. BP goal maintained. AAOx4, following commands and moving all extremities well. Family member at bedside, updated on current condition and POC for pm shift. All questions answered and emotional support provided. Insulin gtt infusing; accu check Q4 w prn coverage. Dilaudid IV for pain control. 1 bloody BM noted; H&H stable. Pt assisted with weight shift, and encouraged to cough/deep breathe. Full bed bath and linens changed. Remains free of falls and injury for pm shift. MD Theron updated on current condition, vitals, labs, and gtts. No new orders received, will continue to monitor.

## 2019-05-07 NOTE — PROGRESS NOTES
"[facility]  Endocrinology  Progress Note    Admit Date: 4/20/2019     Reason for Consult: Management of T2DM, Hyperglycemia     Surgical Procedure and Date:       04/21/2019:         1. Exploratory laparotomy        2. Ligation of left ureter        3. Removal of left JJ ureteral stent      Diabetes diagnosis year: ANDRE    Home Diabetes Medications:  ANDRE  Toujeo 50 BID  Invokana 300mg daily   Novolog 35 units AM, 45 units PM, 35 units PM    How often checking glucose at home? ANDRE   BG readings on regimen: ANDRE  Hypoglycemia on the regimen?  ANDRE  Missed doses on regimen?  ANDRE    Diabetes Complications include:     Hyperglycemia and Diabetic retinopathy     Complicating diabetes co morbidities:   History of MI and MURTAZA, CAD, HLD    HPI:   Patient is a 59 y.o. female with a diagnosis of HTN, HLN, DM type 2, nonproliferative diabetic renopathy, CAD, NSTEMI, and back pain who presents to the ED with a complaint of altered mental status on 04/20/2019. Is now s/p Exploratory laparotomy, Ligation of left ureter, and Removal of left JJ ureteral stent. Endocrinology consulted to manage DM2/Hyperglycemia.    Lab Results   Component Value Date    HGBA1C 10.8 (H) 02/19/2019       Interval HPI:   Overnight events:  BG is within and/or slightly above or within goal  on current IV insulin infusion. NAEON.     Eating:   NPO  Nausea: No  Hypoglycemia and intervention: No  Fever: No  TPN and/or TF: Yes  If yes, type of TF/TPN and rate: 40 ml/hr    /63   Pulse 99   Temp 99.2 °F (37.3 °C) (Oral)   Resp (!) 22   Ht 5' 4" (1.626 m)   Wt 77.2 kg (170 lb 3.1 oz)   SpO2 99%   Breastfeeding? No   BMI 29.21 kg/m²      Labs Reviewed and Include    Recent Labs   Lab 05/07/19  0330   *   CALCIUM 8.5*   ALBUMIN 1.9*   PROT 6.3      K 4.2   CO2 30*      BUN 26*   CREATININE 1.0   ALKPHOS 99   ALT 18   AST 22   BILITOT 0.8     Lab Results   Component Value Date    WBC 11.26 05/07/2019    HGB 7.1 (L) " 05/07/2019    HCT 23.3 (L) 05/07/2019    MCV 88 05/07/2019     (H) 05/07/2019     No results for input(s): TSH, FREET4 in the last 168 hours.  Lab Results   Component Value Date    HGBA1C 10.8 (H) 02/19/2019       Nutritional status:   Body mass index is 29.21 kg/m².  Lab Results   Component Value Date    ALBUMIN 1.9 (L) 05/07/2019    ALBUMIN 1.7 (L) 05/06/2019    ALBUMIN 1.7 (L) 05/05/2019     No results found for: PREALBUMIN    Estimated Creatinine Clearance: 61.7 mL/min (based on SCr of 1 mg/dL).    Accu-Checks  Recent Labs     05/05/19  2016 05/06/19  0004 05/06/19  0414 05/06/19  0817 05/06/19  1157 05/06/19  1607 05/06/19  2100 05/07/19  0000 05/07/19  0337 05/07/19  0734   POCTGLUCOSE 162* 229* 208* 191* 203* 176* 154* 194* 229* 182*       Current Medications and/or Treatments Impacting Glycemic Control  Immunotherapy:    Immunosuppressants     None        Steroids:   Hormones (From admission, onward)    None        Pressors:    Autonomic Drugs (From admission, onward)    None        Hyperglycemia/Diabetes Medications:   Antihyperglycemics (From admission, onward)    Start     Stop Route Frequency Ordered    05/05/19 0924  insulin aspart U-100 pen 0-5 Units      -- SubQ Before meals & nightly PRN 05/05/19 0825    05/03/19 0945  insulin regular (Humulin R) 100 Units in sodium chloride 0.9% 100 mL infusion      -- IV Continuous 05/03/19 0836    05/03/19 0935  insulin aspart U-100 pen 0-5 Units      -- SubQ As needed (PRN) 05/03/19 0836          ASSESSMENT and PLAN    * Ureteral transection of left native kidney  Managed per primary team  Avoid hypoglycemia        Type 2 diabetes mellitus with diabetic peripheral angiopathy without gangrene  BG goal 140 - 180    BG is within or above goal on tansition IV insulin Infusion. Remains on TF. Patient's insulin requirements have significantly reduced from insulin regimen.     Continue transition IV insulin infusion with stepdown parameters. Initial rate starting  at 1.5 (BG is reasonably controlled while on TF. TF are now at goal).  Low Dose SQ Insulin Correction Scale as needed for BG excursions caused by continuous TF.   BG monitoring every 4 hours while on TF.        ** Please notify Endocrine for any change and/or advance in diet/TF**  ** Please call Endocrine for any BG related issues **    Discharge Recommendations:     TBD.             CAD (coronary artery disease)    Managed per primary team  Condition may cause insulin resistance       HLD (hyperlipidemia)  Managed per primary team  Condition may cause insulin resistance         On enteral nutrition  May cause BG excursions.       Sleep apnea  May affect BG readings if uncontrolled            Uriah Holder NP  Endocrinology  [facility]

## 2019-05-07 NOTE — SUBJECTIVE & OBJECTIVE
"PTA Medications   Medication Sig    albuterol (PROVENTIL/VENTOLIN HFA) 90 mcg/actuation inhaler Inhale 2 puffs into the lungs every 6 (six) hours as needed for Wheezing.    amLODIPine (NORVASC) 10 MG tablet TAKE 1 TABLET (10 MG TOTAL) BY MOUTH ONCE DAILY.    aspirin 81 mg Tab Take 81 mg by mouth. 1 Tablet Oral Every day    atorvastatin (LIPITOR) 40 MG tablet TAKE 1 TABLET (40 MG TOTAL) BY MOUTH ONCE DAILY.    ACCU-CHEK EDIN PLUS METER [facility]     BD INSULIN PEN NEEDLE UF MINI 31 x 3/16 " Ndle USE 4 TIMES A DAY    blood sugar diagnostic (ACCU-CHEK EDIN PLUS TEST STRP) Strp TEST BLOOD SUGARS 4 TIMES DAILY    chlorthalidone (HYGROTEN) 50 MG Tab Take 1 tablet (50 mg total) by mouth once daily.    cyclobenzaprine (FLEXERIL) 10 MG tablet TAKE 1 TABLET BY MOUTH 3 TIMES A DAY AS NEEDED FOR MUSCLE SPASMS. NO WORKING OR DRIVING ON THIS MED    esomeprazole (NEXIUM) 40 MG capsule TAKE 1 CAPSULE (40 MG TOTAL) BY MOUTH BEFORE BREAKFAST.    fenofibrate micronized (LOFIBRA) 134 MG Cap TAKE 1 CAPSULE (134 MG TOTAL) BY MOUTH BEFORE BREAKFAST.    flash glucose scanning reader (FREESTYLE MISTI 14 DAY READER) Misc 1 each by Misc.(Non-Drug; Combo Route) route once daily.    flash glucose sensor (FREESTYLE MISTI 14 DAY SENSOR) Kit 1 each by Misc.(Non-Drug; Combo Route) route once daily.    gabapentin (NEURONTIN) 100 MG capsule Take 2 capsules (200 mg total) by mouth every evening.    insulin aspart U-100 (NOVOLOG FLEXPEN U-100 INSULIN) 100 unit/mL InPn pen INJECT 35 U AM, 45 U AT NOON, 35 U PM.150-200 +2, 201-250 +4, 251-300 +6, 301-350 +8, >350 +10    insulin glargine, TOUJEO, (TOUJEO SOLOSTAR U-300 INSULIN) 300 unit/mL (1.5 mL) InPn pen Inject 50 Units into the skin 2 (two) times daily.    INVOKANA 300 mg Tab tablet Take 1 tablet (300 mg total) by mouth once daily.    irbesartan (AVAPRO) 300 MG tablet Take 1 tablet (300 mg total) by mouth every evening.    lancets 30 gauge Misc     metoprolol succinate (TOPROL-XL) 100 " "MG 24 hr tablet TAKE 1 TABLET (100 MG TOTAL) BY MOUTH ONCE DAILY.    nitroGLYCERIN (NITROSTAT) 0.4 MG SL tablet Take one every 2-3 min till chestpain relief for 3 times and if still no relief, call MD or come to ED    omega-3 acid ethyl esters (LOVAZA) 1 gram capsule Take 1 g by mouth once daily.     pen needle, diabetic (BD ULTRA-FINE GRABIEL PEN NEEDLES) 32 gauge x 5/32" Ndle For use with insulin pens daily 4 times a day    prasugrel (EFFIENT) 10 mg Tab TAKE 1 TABLET (10 MG TOTAL) BY MOUTH ONCE DAILY.    tamsulosin (FLOMAX) 0.4 mg Cap Take 1 capsule (0.4 mg total) by mouth every evening.    tramadol (ULTRAM) 50 mg tablet Take 1 tablet (50 mg total) by mouth 3 (three) times daily as needed for Pain.    TRULICITY 1.5 mg/0.5 mL PnIj INJECT 1.5 MG INTO THE SKIN EVERY 7 DAYS.    zolpidem (AMBIEN) 5 MG Tab Take 1 tablet (5 mg total) by mouth nightly as needed.       Review of patient's allergies indicates:  No Known Allergies    Past Medical History:   Diagnosis Date    Anticoagulant long-term use     Asthma     Back pain     CAD (coronary artery disease)     s/p stentimg  (2), (1)    Carotid artery stenosis     Diabetes mellitus type 2 in obese     HTN (hypertension), benign     Hyperlipidemia     Keloid cicatrix     NPDR (nonproliferative diabetic retinopathy) 2015    NSTEMI (non-ST elevated myocardial infarction)     Nuclear sclerosis - Right Eye 3/18/2014    Primary localized osteoarthrosis, lower leg 2014    Sleep apnea      Past Surgical History:   Procedure Laterality Date    BRONCHOSCOPY N/A 2019    Performed by Sean [facility] at [facility] OR 2ND FLR    CARDIAC CATHETERIZATION      cataract extraction left eye      cataracts      CATHETERIZATION, HEART, LEFT Left 2014    Performed by Wilman Kim MD at [facility] CATH LAB     SECTION, LOW TRANSVERSE      CORONARY ANGIOPLASTY      Creation, Nephrostomy, Percutaneous Left 2019    Performed by " Karina Surgeon at [facility] KARINA    CREATION, TRACHEOSTOMY N/A 5/2/2019    Performed by Sean Ruano MD at [facility] OR 2ND FLR    EGD, WITH PEG TUBE INSERTION  5/2/2019    Performed by Sean Ruano MD at [facility] OR 2ND FLR    ESOPHAGOGASTRODUODENOSCOPY (EGD) N/A 8/12/2016    Performed by Gardenia Adamson MD at [facility] ENDO (4TH FLR)    EXCISION TURBINATE, SUBMUCOUS      FUSION, SPINE, LUMBAR, ANTERIOR APPROACH Left L5-S1 Anterior to Psoa Interbody Fusion, L5-S1 Posterior Instrumentation Left 4/12/2019    Performed by Mk George MD at [facility] OR 2ND FLR    HAND SURGERY Left     HAND SURGERY Right     torn ligament repair/ Dr. Yeboah    HYSTERECTOMY      Injection,steroid,epidural,transforaminal approach - Bilateral - S1 Bilateral 9/25/2018    Performed by Tl Abreu MD at [facility] PAIN MGT    Injection-steroid-epidural-caudal N/A 2/7/2019    Performed by Dave Bentley Jr., MD at [facility] PAIN MGT    INSERTION-INTRAOCULAR LENS (IOL) Right 9/1/2015    Performed by Good Domingo MD at [facility] OR 1ST FLR    LAPAROTOMY, EXPLORATORY; LIGATION OF LEFT URETER; DOUBLE J STENT REMOVAL LEFT URETER Left 4/20/2019    Performed by Paul Carlson MD at [facility] OR 2ND FLR    left foot surgery      left wrist surgery      NASAL SEPTUM SURGERY  5/7/15    PHACOEMULSIFICATION-ASPIRATION-CATARACT Right 9/1/2015    Performed by Good Domingo MD at [facility] OR 1ST FLR    REPAIR, URETER  4/12/2019    Performed by Mk George MD at [facility] OR 2ND FLR    RESECTION-TURBINATES (SMR) N/A 5/7/2015    Performed by Dileep Dubois III, MD at [facility] OR 2ND FLR    rt elbow surgery      S/P LAD COATED STENT  05/14/2010    6 total     S/P OM1 STENT  08/2003    SEPTOPLASTY N/A 5/7/2015    Performed by Dileep Dubois III, MD at [facility] OR 2ND FLR    SINUS SURGERY      F.E.S.S.    SINUS SURGERY FUNCTIONAL ENDOSCOPIC WITH NAVIGATION WITH MAXILLARIES, ETHMOIDS, LEFT SPHENOID, LEFT LOLY N/A 5/7/2015    Performed by  [place] CHARLOTTE Dubois III, MD at [facility] OR 2ND FLR    STENT, URETERAL placement  4/12/2019    Performed by Robin Boyd MD at [facility] OR 2ND FLR    TUBAL LIGATION       Family History     Problem Relation (Age of Onset)    Diabetes Mother, Father, Sister, Brother, Sister    Heart attack Father    Heart disease Mother    Leukemia Father    No Known Problems Sister, Brother, Brother, Maternal Grandmother, Maternal Grandfather, Paternal Grandmother, Paternal Grandfather, Son, Son, Maternal Aunt, Maternal Uncle, Paternal Aunt, Paternal Uncle        Tobacco Use    Smoking status: Never Smoker    Smokeless tobacco: Never Used   Substance and Sexual Activity    Alcohol use: No     Alcohol/week: 0.0 oz    Drug use: No    Sexual activity: Yes     Partners: Male     Birth control/protection: Post-menopausal     Comment:      Review of Systems   Constitutional: Positive for fatigue. Negative for appetite change, fever and unexpected weight change.   HENT: Negative.    Eyes: Negative.    Respiratory: Negative for chest tightness and shortness of breath.    Cardiovascular: Negative for chest pain, palpitations and leg swelling.   Gastrointestinal: Positive for anal bleeding and blood in stool. Negative for abdominal distention, abdominal pain, constipation, diarrhea, nausea, rectal pain and vomiting.        Soft stools   Endocrine: Negative.    Genitourinary: Negative.    Musculoskeletal: Negative.    Skin: Negative.    Neurological: Negative.    Psychiatric/Behavioral: Negative.      Objective:     Vital Signs (Most Recent):  Temp: 99.8 °F (37.7 °C) (05/07/19 1100)  Pulse: 90 (05/07/19 1315)  Resp: 20 (05/07/19 1315)  BP: (!) 144/67 (05/07/19 1145)  SpO2: (!) 94 % (05/07/19 1315) Vital Signs (24h Range):  Temp:  [99.2 °F (37.3 °C)-100.3 °F (37.9 °C)] 99.8 °F (37.7 °C)  Pulse:  [] 90  Resp:  [11-34] 20  SpO2:  [76 %-100 %] 94 %  BP: (103-164)/(53-75) 144/67     Weight: 77.2 kg (170 lb 3.1 oz)  Body mass index is 29.21  kg/m².    Physical Exam    General: In no acute distress, well appearance.   CV: RRR  Pulm: On trach collar  Abd: soft, non distended, non tender.   Ext: wwp      Anorectal Exam:    Anal Skin: Excoriated, moderate size anterior fissure    Digital Rectal Exam:  Resting Tone normal  Squeeze normal  Mass none  Tenderness  absent    Anoscopy:  Hemorrhoids  Present, grade I  Mucosal erosions found on distal rectum proximal anus at the dentate line with stigmata of bleeding. Bright blood seen.   Distal rectal mucosa ulcerated    Significant Labs:  BMP (Last 3 Results):   Recent Labs   Lab 05/05/19  0412 05/05/19  1706 05/06/19  0409 05/07/19  0330   *  --  167* 198*     --  138 138   K 3.6 3.5 3.9 4.2   CL 99  --  100 101   CO2 31*  --  31* 30*   BUN 15  --  22* 26*   CREATININE 0.8  --  0.8 1.0   CALCIUM 8.5*  --  8.2* 8.5*   MG 1.5* 2.1 2.1 2.1     CBC (Last 3 Results):   Recent Labs   Lab 05/06/19 2000 05/07/19  0330 05/07/19  0853   WBC 13.36* 12.53 11.26   RBC 2.65* 2.67* 2.64*   HGB 7.4* 7.2* 7.1*   HCT 23.5* 23.6* 23.3*   * 630* 583*   MCV 89 88 88   MCH 27.9 27.0 26.9*   MCHC 31.5* 30.5* 30.5*

## 2019-05-07 NOTE — PT/OT/SLP PROGRESS
Physical Therapy Co-Treatment with OT    Patient Name:  Mariann Huff   MRN:  [id number]    Recommendations:     Discharge Recommendations:  rehabilitation facility   Discharge Equipment Recommendations: (will determine DME needs closer to discharge)   Barriers to discharge: Decreased caregiver support family will not be able to assist at current functional level.     Assessment:     Mariann Huff is a 59 y.o. female admitted with a medical diagnosis of Ureteral transection of left native kidney.  She presents with the following impairments/functional limitations:  weakness, gait instability, impaired balance, impaired endurance, decreased lower extremity function, impaired functional mobilty pt kenyetta treatment better being able to gait train farther. Pt will benefit from cont skilled PT 4x/wk to progress physically. Pt will need inpt rehab when medically stable. Pt is s/p exp lap, ligation L ureter 4/21, trach/PEG 5/2, back surgery 4/12/19 (previous admit)     Rehab Prognosis: Good; patient would benefit from acute skilled PT services to address these deficits and reach maximum level of function.    Recent Surgery: Procedure(s) (LRB):  CREATION, TRACHEOSTOMY (N/A)  BRONCHOSCOPY (N/A)  EGD, WITH PEG TUBE INSERTION 5 Days Post-Op    Plan:     During this hospitalization, patient to be seen 4 x/week to address the identified rehab impairments via gait training, therapeutic activities, therapeutic exercises and progress toward the following goals:    · Plan of Care Expires:  06/03/19    Subjective     Chief Complaint: pt had no complaints during treatment.   Patient/Family Comments/goals:  To get better and go home.   Pain/Comfort:  · Pain Rating 1: 0/10  · Pain Rating Post-Intervention 1: 0/10      Objective:     Communicated with nurse prior to session.  Patient found supine with telemetry, pulse ox (continuous), blood pressure cuff, PICC line, tracheostomy, PEG Tube, correa catheter, oxygen, MADHURI drain, T-tube drain  upon PT entry to room.     General Precautions: Standard, fall   Orthopedic Precautions:    Braces: LSO(when pt not supine)     Functional Mobility:  · Bed Mobility: pt needed verbal cues for hand placement and sequencing for functional mobility.     · Rolling Right: maximal assistance  · Supine to Sit: maximal assistance    Balance:pt sat on EOB x 10 min with CGA to SBA for static sitting balance.   ·   · Transfers:     · Sit to Stand:  maximal assistance with rolling walker. Pt was total assist to Don LSO brace. Pt stood x 2 reps.   ·   · Gait: pt received gait training ~ 8 ft with RW, LSO brace on and min assist. pt was additional assist of 1 for equipment.       AM-PAC 6 CLICK MOBILITY  Turning over in bed (including adjusting bedclothes, sheets and blankets)?: 2  Sitting down on and standing up from a chair with arms (e.g., wheelchair, bedside commode, etc.): 2  Moving from lying on back to sitting on the side of the bed?: 2  Moving to and from a bed to a chair (including a wheelchair)?: 2  Need to walk in hospital room?: 2  Climbing 3-5 steps with a railing?: 1  Basic Mobility Total Score: 11         Patient left up in chair with all lines intact and call button in reach.    GOALS:   Multidisciplinary Problems     Physical Therapy Goals        Problem: Physical Therapy Goal    Goal Priority Disciplines Outcome Goal Variances Interventions   Physical Therapy Goal     PT, PT/OT      Description:  Goals to be met by: 19    Patient will increase functional independence with mobility by performin. Supine to sit with Moderate Assistance -not met  2. Sit to stand transfer with Minimal Assistance -not met  3. Gait  x 30 feet with Contact Guard Assistance using Rolling Walker. -not met  4. Lower extremity exercise program x15 reps , with assistance as needed-not met                      Time Tracking:     PT Received On: 19  PT Start Time: 1052     PT Stop Time: 1116  PT Total Time (min): 24 min      Billable Minutes: Gait Training 10 min and Therapeutic Activity 13 min    Treatment Type: Other (see comments)(co-treatment with OT)  PT/PTA: PT     PTA Visit Number: 0     Cathy Taylor, PT  05/07/2019

## 2019-05-07 NOTE — ASSESSMENT & PLAN NOTE
BG goal 140 - 180    BG is within or above goal on tansition IV insulin Infusion. Remains on TF. Patient's insulin requirements have significantly reduced from insulin regimen.     Continue transition IV insulin infusion with stepdown parameters. Initial rate starting at 1.5 (BG is reasonably controlled while on TF. TF are now at goal).  Low Dose SQ Insulin Correction Scale as needed for BG excursions caused by continuous TF.   BG monitoring every 4 hours while on TF.        ** Please notify Endocrine for any change and/or advance in diet/TF**  ** Please call Endocrine for any BG related issues **    Discharge Recommendations:     TBD.

## 2019-05-07 NOTE — SUBJECTIVE & OBJECTIVE
"Interval HPI:   Overnight events:  BG is within and/or slightly above or within goal  on current IV insulin infusion. NAEON.     Eating:   NPO  Nausea: No  Hypoglycemia and intervention: No  Fever: No  TPN and/or TF: Yes  If yes, type of TF/TPN and rate: 40 ml/hr    /63   Pulse 99   Temp 99.2 °F (37.3 °C) (Oral)   Resp (!) 22   Ht 5' 4" (1.626 m)   Wt 77.2 kg (170 lb 3.1 oz)   SpO2 99%   Breastfeeding? No   BMI 29.21 kg/m²     Labs Reviewed and Include    Recent Labs   Lab 05/07/19  0330   *   CALCIUM 8.5*   ALBUMIN 1.9*   PROT 6.3      K 4.2   CO2 30*      BUN 26*   CREATININE 1.0   ALKPHOS 99   ALT 18   AST 22   BILITOT 0.8     Lab Results   Component Value Date    WBC 11.26 05/07/2019    HGB 7.1 (L) 05/07/2019    HCT 23.3 (L) 05/07/2019    MCV 88 05/07/2019     (H) 05/07/2019     No results for input(s): TSH, FREET4 in the last 168 hours.  Lab Results   Component Value Date    HGBA1C 10.8 (H) 02/19/2019       Nutritional status:   Body mass index is 29.21 kg/m².  Lab Results   Component Value Date    ALBUMIN 1.9 (L) 05/07/2019    ALBUMIN 1.7 (L) 05/06/2019    ALBUMIN 1.7 (L) 05/05/2019     No results found for: PREALBUMIN    Estimated Creatinine Clearance: 61.7 mL/min (based on SCr of 1 mg/dL).    Accu-Checks  Recent Labs     05/05/19  2016 05/06/19  0004 05/06/19  0414 05/06/19  0817 05/06/19  1157 05/06/19  1607 05/06/19  2100 05/07/19  0000 05/07/19  0337 05/07/19  0734   POCTGLUCOSE 162* 229* 208* 191* 203* 176* 154* 194* 229* 182*       Current Medications and/or Treatments Impacting Glycemic Control  Immunotherapy:    Immunosuppressants     None        Steroids:   Hormones (From admission, onward)    None        Pressors:    Autonomic Drugs (From admission, onward)    None        Hyperglycemia/Diabetes Medications:   Antihyperglycemics (From admission, onward)    Start     Stop Route Frequency Ordered    05/05/19 9551  insulin aspart U-100 pen 0-5 Units      -- SubQ " Before meals & nightly PRN 05/05/19 0825    05/03/19 0945  insulin regular (Humulin R) 100 Units in sodium chloride 0.9% 100 mL infusion      -- IV Continuous 05/03/19 0836    05/03/19 0935  insulin aspart U-100 pen 0-5 Units      -- SubQ As needed (PRN) 05/03/19 0836

## 2019-05-07 NOTE — ASSESSMENT & PLAN NOTE
Ms. Huff is a 58 yo F with PMH of HTN, DM II, CAD, NSTEMI, s/p L5-S1 OLIF with NSGY 4/12 c/b intraoperative left ureteral injury and underwent ureteroureteral anastomoses and ureteral stent placement complicated by sepsis due to E.coli septicemia now s/p ex lap on 4/21 and PEG/Trach of the vent now having BRBPR.     - Rectal ulcerations from Flexi Seal seen during exam.    - Recommend continue to hold heparin gtt for now.   - Avoid any medications per rectum.   - Serial H&H until stable.   - Will continue to follow, please call if any questions. Thank you.

## 2019-05-07 NOTE — CONSULTS
[facility]  Colorectal Surgery  Consult Note    Patient Name: Mariann Huff  MRN: [id number]  Admission Date: 4/20/2019  Hospital Length of Stay: 17 days  Attending Physician: Robin Boyd MD  Primary Care Provider: Jasbir Haney MD    Inpatient consult to Colorectal Surgery  Consult performed by: Maura Wadsworth MD  Consult ordered by: Ehsan Espino MD        Subjective:     Chief Complaint/Reason for Admission: BRBPR    History of Present Illness:    Ms. Huff is a 60 yo F with PMH of HTN, DM II, CAD, NSTEMI, s/p L5-S1 OLIF with NSGY 4/12 c/b intraoperative left ureteral injury and underwent ureteroureteral anastomoses and ureteral stent placement complicated by sepsis due to E.coli septicemia now s/p ex lap on 4/21 and washout complicated by respiratory failure requiring trach and PEG now on trach collar who the past 24 hours has been having blood per rectum every time she has a BM.     Per nurse and chart review patient had 2 BM yesterday with bright red blood as well as clots, her H&H drop from 6.7-7.7 this morning was 7.1. Today she had as well one bowel movement with bright red blood and one other time when she past some blood clots with no stool. She was on a heparin gtt that was stopped yesterday for a RUQ DVT and she had a rectal tube in place from 4/21 to 5/4.     Patient and  states she had a colonscopy recently, only thing I see in file is an EGD that shows a Schatzki ring back in 2016.      PTA Medications   Medication Sig    albuterol (PROVENTIL/VENTOLIN HFA) 90 mcg/actuation inhaler Inhale 2 puffs into the lungs every 6 (six) hours as needed for Wheezing.    amLODIPine (NORVASC) 10 MG tablet TAKE 1 TABLET (10 MG TOTAL) BY MOUTH ONCE DAILY.    aspirin 81 mg Tab Take 81 mg by mouth. 1 Tablet Oral Every day    atorvastatin (LIPITOR) 40 MG tablet TAKE 1 TABLET (40 MG TOTAL) BY MOUTH ONCE DAILY.    ACCU-CHEK EDIN PLUS METER Misc     BD INSULIN PEN NEEDLE UF MINI  "31 x 3/16 " Ndle USE 4 TIMES A DAY    blood sugar diagnostic (ACCU-CHEK EDIN PLUS TEST STRP) Strp TEST BLOOD SUGARS 4 TIMES DAILY    chlorthalidone (HYGROTEN) 50 MG Tab Take 1 tablet (50 mg total) by mouth once daily.    cyclobenzaprine (FLEXERIL) 10 MG tablet TAKE 1 TABLET BY MOUTH 3 TIMES A DAY AS NEEDED FOR MUSCLE SPASMS. NO WORKING OR DRIVING ON THIS MED    esomeprazole (NEXIUM) 40 MG capsule TAKE 1 CAPSULE (40 MG TOTAL) BY MOUTH BEFORE BREAKFAST.    fenofibrate micronized (LOFIBRA) 134 MG Cap TAKE 1 CAPSULE (134 MG TOTAL) BY MOUTH BEFORE BREAKFAST.    flash glucose scanning reader (FREESTYLE MISTI 14 DAY READER) Misc 1 each by Misc.(Non-Drug; Combo Route) route once daily.    flash glucose sensor (FREESTYLE MISTI 14 DAY SENSOR) Kit 1 each by Misc.(Non-Drug; Combo Route) route once daily.    gabapentin (NEURONTIN) 100 MG capsule Take 2 capsules (200 mg total) by mouth every evening.    insulin aspart U-100 (NOVOLOG FLEXPEN U-100 INSULIN) 100 unit/mL InPn pen INJECT 35 U AM, 45 U AT NOON, 35 U PM.150-200 +2, 201-250 +4, 251-300 +6, 301-350 +8, >350 +10    insulin glargine, TOUJEO, (TOUJEO SOLOSTAR U-300 INSULIN) 300 unit/mL (1.5 mL) InPn pen Inject 50 Units into the skin 2 (two) times daily.    INVOKANA 300 mg Tab tablet Take 1 tablet (300 mg total) by mouth once daily.    irbesartan (AVAPRO) 300 MG tablet Take 1 tablet (300 mg total) by mouth every evening.    lancets 30 gauge Misc     metoprolol succinate (TOPROL-XL) 100 MG 24 hr tablet TAKE 1 TABLET (100 MG TOTAL) BY MOUTH ONCE DAILY.    nitroGLYCERIN (NITROSTAT) 0.4 MG SL tablet Take one every 2-3 min till chestpain relief for 3 times and if still no relief, call MD or come to ED    omega-3 acid ethyl esters (LOVAZA) 1 gram capsule Take 1 g by mouth once daily.     pen needle, diabetic (BD ULTRA-FINE GRABIEL PEN NEEDLES) 32 gauge x 5/32" Ndle For use with insulin pens daily 4 times a day    prasugrel (EFFIENT) 10 mg Tab TAKE 1 TABLET (10 MG " TOTAL) BY MOUTH ONCE DAILY.    tamsulosin (FLOMAX) 0.4 mg Cap Take 1 capsule (0.4 mg total) by mouth every evening.    tramadol (ULTRAM) 50 mg tablet Take 1 tablet (50 mg total) by mouth 3 (three) times daily as needed for Pain.    TRULICITY 1.5 mg/0.5 mL PnIj INJECT 1.5 MG INTO THE SKIN EVERY 7 DAYS.    zolpidem (AMBIEN) 5 MG Tab Take 1 tablet (5 mg total) by mouth nightly as needed.       Review of patient's allergies indicates:  No Known Allergies    Past Medical History:   Diagnosis Date    Anticoagulant long-term use     Asthma     Back pain     CAD (coronary artery disease)     s/p stentimg  (2), (1)    Carotid artery stenosis     Diabetes mellitus type 2 in obese     HTN (hypertension), benign     Hyperlipidemia     Keloid cicatrix     NPDR (nonproliferative diabetic retinopathy) 2015    NSTEMI (non-ST elevated myocardial infarction)     Nuclear sclerosis - Right Eye 3/18/2014    Primary localized osteoarthrosis, lower leg 2014    Sleep apnea      Past Surgical History:   Procedure Laterality Date    BRONCHOSCOPY N/A 2019    Performed by [facility]ano MD at [facility] OR 2ND FLR    CARDIAC CATHETERIZATION      cataract extraction left eye      cataracts      CATHETERIZATION, HEART, LEFT Left 2014    Performed by Wilman Kim MD at [facility] CATH LAB     SECTION, LOW TRANSVERSE      CORONARY ANGIOPLASTY      Creation, Nephrostomy, Percutaneous Left 2019    Performed by Karina Surgeon at [facility] KARINA    CREATION, TRACHEOSTOMY N/A 2019    Performed by Sean [facility]ano MD at [facility] OR 2ND FLR    EGD, WITH PEG TUBE INSERTION  2019    Performed by Sean [facility]ano MD at [facility] OR 2ND FLR    ESOPHAGOGASTRODUODENOSCOPY (EGD) N/A 2016    Performed by Gardenia Adamson MD at [facility] ENDO (4TH FLR)    EXCISION TURBINATE, SUBMUCOUS      FUSION, SPINE, LUMBAR, ANTERIOR APPROACH Left L5-S1 Anterior to Psoa Interbody Fusion, L5-S1 Posterior  Instrumentation Left 4/12/2019    Performed by Mk George MD at [facility] OR 2ND FLR    HAND SURGERY Left     HAND SURGERY Right     torn ligament repair/ Dr. Yeboah    HYSTERECTOMY      Injection,steroid,epidural,transforaminal approach - Bilateral - S1 Bilateral 9/25/2018    Performed by Tl Abreu MD at [facility] PAIN MGT    Injection-steroid-epidural-caudal N/A 2/7/2019    Performed by Dave Bentley Jr., MD at [facility] PAIN MGT    INSERTION-INTRAOCULAR LENS (IOL) Right 9/1/2015    Performed by Good Domingo MD at [facility] OR 1ST FLR    LAPAROTOMY, EXPLORATORY; LIGATION OF LEFT URETER; DOUBLE J STENT REMOVAL LEFT URETER Left 4/20/2019    Performed by Paul Carlson MD at [facility] OR [address] [place] [address]    left foot surgery      left wrist surgery      NASAL SEPTUM SURGERY  5/7/15    PHACOEMULSIFICATION-ASPIRATION-CATARACT Right 9/1/2015    Performed by Good Domingo MD at [facility] OR [facility] FLR    REPAIR, URETER  4/12/2019    Performed by Mk George MD at [facility] OR [address] [place] [address]    RESECTION-TURBINATES (SMR) N/A 5/7/2015    Performed by Dileep Dubois III, MD at [facility] OR [address] [place] [address]    rt elbow surgery      S/P LAD COATED STENT  05/14/2010    6 total     S/P OM1 STENT  08/2003    SEPTOPLASTY N/A 5/7/2015    Performed by Dileep Dubois III, MD at [facility] OR [address] [place] [address]    SINUS SURGERY      F.E.S.S.    SINUS SURGERY FUNCTIONAL ENDOSCOPIC WITH NAVIGATION WITH MAXILLARIES, ETHMOIDS, LEFT SPHENOID, LEFT LOLY N/A 5/7/2015    Performed by Dileep Dubois III, MD at [facility] OR [address] [place] [address]    STENT, URETERAL placement  4/12/2019    Performed by Robin Boyd MD at [facility] OR [address] [place] [address]    TUBAL LIGATION       Family History     Problem Relation (Age of Onset)    Diabetes Mother, Father, Sister, Brother, Sister    Heart attack Father    Heart disease Mother    Leukemia Father    No Known Problems Sister, Brother, Brother, Maternal Grandmother, Maternal Grandfather, Paternal Grandmother, Paternal Grandfather, Son, Son,  Maternal Aunt, Maternal Uncle, Paternal Aunt, Paternal Uncle        Tobacco Use    Smoking status: Never Smoker    Smokeless tobacco: Never Used   Substance and Sexual Activity    Alcohol use: No     Alcohol/week: 0.0 oz    Drug use: No    Sexual activity: Yes     Partners: Male     Birth control/protection: Post-menopausal     Comment:      Review of Systems   Constitutional: Positive for fatigue. Negative for appetite change, fever and unexpected weight change.   HENT: Negative.    Eyes: Negative.    Respiratory: Negative for chest tightness and shortness of breath.    Cardiovascular: Negative for chest pain, palpitations and leg swelling.   Gastrointestinal: Positive for anal bleeding and blood in stool. Negative for abdominal distention, abdominal pain, constipation, diarrhea, nausea, rectal pain and vomiting.        Soft stools   Endocrine: Negative.    Genitourinary: Negative.    Musculoskeletal: Negative.    Skin: Negative.    Neurological: Negative.    Psychiatric/Behavioral: Negative.      Objective:     Vital Signs (Most Recent):  Temp: 99.8 °F (37.7 °C) (05/07/19 1100)  Pulse: 90 (05/07/19 1315)  Resp: 20 (05/07/19 1315)  BP: (!) 144/67 (05/07/19 1145)  SpO2: (!) 94 % (05/07/19 1315) Vital Signs (24h Range):  Temp:  [99.2 °F (37.3 °C)-100.3 °F (37.9 °C)] 99.8 °F (37.7 °C)  Pulse:  [] 90  Resp:  [11-34] 20  SpO2:  [76 %-100 %] 94 %  BP: (103-164)/(53-75) 144/67     Weight: 77.2 kg (170 lb 3.1 oz)  Body mass index is 29.21 kg/m².    Physical Exam    General: In no acute distress, well appearance.   CV: RRR  Pulm: On trach collar  Abd: soft, non distended, non tender.   Ext: wwp      Anorectal Exam:    Anal Skin: Excoriated, moderate size anterior fissure    Digital Rectal Exam:  Resting Tone normal  Squeeze normal  Mass none  Tenderness  absent    Anoscopy:  Hemorrhoids  Present, grade I  Mucosal erosions found on distal rectum proximal anus at the dentate line with stigmata of bleeding.  Bright blood seen.   Distal rectal mucosa ulcerated    Significant Labs:  BMP (Last 3 Results):   Recent Labs   Lab 05/05/19  0412 05/05/19  1706 05/06/19  0409 05/07/19  0330   *  --  167* 198*     --  138 138   K 3.6 3.5 3.9 4.2   CL 99  --  100 101   CO2 31*  --  31* 30*   BUN 15  --  22* 26*   CREATININE 0.8  --  0.8 1.0   CALCIUM 8.5*  --  8.2* 8.5*   MG 1.5* 2.1 2.1 2.1     CBC (Last 3 Results):   Recent Labs   Lab 05/06/19 2000 05/07/19  0330 05/07/19  0853   WBC 13.36* 12.53 11.26   RBC 2.65* 2.67* 2.64*   HGB 7.4* 7.2* 7.1*   HCT 23.5* 23.6* 23.3*   * 630* 583*   MCV 89 88 88   MCH 27.9 27.0 26.9*   MCHC 31.5* 30.5* 30.5*           Assessment/Plan:     BRBPR (bright red blood per rectum)  Ms. Huff is a 58 yo F with PMH of HTN, DM II, CAD, NSTEMI, s/p L5-S1 OLIF with NSGY 4/12 c/b intraoperative left ureteral injury and underwent ureteroureteral anastomoses and ureteral stent placement complicated by sepsis due to E.coli septicemia now s/p ex lap on 4/21 and PEG/Trach of the vent now having BRBPR.     - Rectal ulcerations from Flexi Seal seen during exam.    - Recommend continue to hold heparin gtt for now.   - Avoid any medications per rectum.   - Serial H&H until stable.   - Will continue to follow, please call if any questions. Thank you.         Maura Mcclellan MD  General Surgery PGY V  Beeper: [phone]

## 2019-05-07 NOTE — CONSULTS
Single lumen 18G x 8CM midline placed left basilic vein. Max dwell date 6/5/19, Lot# [id number].  Needle advanced into the vessel under real time ultrasound guidance.  Image recorded and saved.

## 2019-05-07 NOTE — ASSESSMENT & PLAN NOTE
ASSESSMENT/PLAN:   Mariann Huff is a 59 y.o. female s/p left ureteral injury on 4/12, who presented with fever, AMS, and intraabdominal abscess, taken for washout and ligation of left ureter with nephrostomy tube placement on 4/21. S/p Trach/PEG on 5/2.     Neuro:   - Pain control with fentanyl prn   - Romeltion prn     Pulmonary:   - Been tolerating trach collar since 5/3  - SpO2 % 5L; FiO2 28%  - CXR prn  - ABGs prn      Cardiac:   - HDS at this time.  Pt has been weaned off pressors.   - TTE ordered yesterday, EF 55%     Renal:    - S/p transection of left ureter 4/12 and subsequent ligation and PCT nephrostomy tube placement with IR 4/21  - Renal function improving.    - Monitor I/Os  - Hold Lasix     Intake/Output Summary (Last 24 hours) at 5/7/2019 1129  Last data filed at 5/7/2019 1100  Gross per 24 hour   Intake 1439 ml   Output 1253 ml   Net 186 ml          ID:   - Blood cultures 4/12 +E. Coli; sensitivities pending. Repeat Bld Cx show NGTD.   - UCx from 4/20 growing E. Coli; sensitivities are now back. Repeat Cx pending.   - ID following for assistance with abx therapy, currently on Ceftriaxone, Rifampin and vancomycin.  - AF overnight  - WBC trending down     Hem/Onc:   - H/H decreased overnight to 7.2 from 8.3; cont to monitor  - Hep gtt started for RUE DVT, however holding this for now given bloody BM      Endocrine:  - DM Type 2 at baseline    - TF at goal  - Insulin gtt stopped; LDSSI  - Endocrine following for assistance with blood glucose control.      Fluids/Electrolytes/Nutrition/GI:   -  Start free water flushes 300 cc q6h  - Replace electrolytes PRN    # Shock Liver          - Resolved           - Labs continue to show improvement          - Elevated LFTs now normalized          - Thrombocytopenia; plts count improving; plts normalized          - INR normalized to 0.9     # Lactic Acidosis          - Now resolved    # Bloody BM  - TF at goal (40/hr) now, will add bulking agents  -  Frequent BMs, some bloody  - Consult CRS today     PPx:  - DVT: Lovenox        DISPO:  - Continue SICU care                Critical care was time spent personally by me on the following activities: development of treatment plan with patient or surrogate and bedside caregivers, discussions with consultants, evaluation of patient's response to treatment, examination of patient, ordering and performing treatments and interventions, ordering and review of laboratory studies, ordering and review of radiographic studies, pulse oximetry, re-evaluation of patient's condition.  This critical care time did not overlap with that of any other provider or involve time for any procedures.         Ehsan Espino MD  PGY2/CA1  Department of Anesthesiology  Pager: [phone]

## 2019-05-07 NOTE — PROGRESS NOTES
[facility]  Urology  Progress Note    Patient Name: Mariann Huff  MRN: [id number]  Admission Date: 4/20/2019  Hospital Length of Stay: 17 days  Code Status: Full Code   Attending Provider: Robin Boyd MD   Primary Care Physician: Jasbir Haney MD    Subjective:     HPI:  Mariann Huff is a 59 y.o. female with history of HTN, type 2 diabetes mellitus, CAD, NSTEMI, and back pain who presents to [facility] with altered mental status and sepsis. She underwent L5-S1 OLIF with NSGY on 4/12 and had intraoperative left ureteral injury with ureteroureteral anastomosis and ureteral stent placement. She did well initially and had correa and MADHURI drain removed on 4/16. She began having chills and altered mental status 2 days ago and this has progressively worsened. No complaints of pain.     She is altered and HPI is limited. In the ED she is febrile to 103 and tachycardic and pressures are low normal. WBC is 5, creatinine is 3.6 baseline 1.0, lactate is 4.6. Cath UA concerning for infection, 3+ LE, >100 WBCs, and many bacteria on microscopy.    CT and MRI abdomen both show air with minimal fluid near the surgical site with left ureter coursing through. There is air throughout the proximal collecting system which is decompressed with JJ ureteral stent in good position.    Taken for ex lap, ligation of left ureter and left neph tube placement on 4/21/19.    Interval History:   No acute events overnight  Denies pain  Denies nausea  1 bloody bowel movement overnight    Review of Systems  Objective:     Temp:  [99.3 °F (37.4 °C)-100.3 °F (37.9 °C)] 99.9 °F (37.7 °C)  Pulse:  [] 106  Resp:  [10-41] 29  SpO2:  [92 %-100 %] 95 %  BP: (103-176)/() 153/75     Body mass index is 29.21 kg/m².           Drains     Drain                 Closed/Suction Drain 04/21/19 0300 LLQ 16 days         Nephrostomy 04/21/19 0629 Left 8 Fr. 16 days         Urethral Catheter 04/20/19 1711 Latex 16 Fr. 16 days          Gastrostomy/Enterostomy 05/02/19 1134 Percutaneous endoscopic gastrostomy (PEG) LUQ decompression;feeding 4 days                Physical Exam   Constitutional: She appears well-developed. No distress.   HENT:   Head: Normocephalic and atraumatic.   Neck: No JVD present.   Cardiovascular: Normal rate and regular rhythm.    Pulmonary/Chest: Effort normal.   On trach collar   Abdominal: Soft. She exhibits no distension. There is no rebound and no guarding.   Incision c/d/i   MADHURI drain with ss output  G-tube   Genitourinary:   Genitourinary Comments: Correa draining clear yellow  Left neph tube with clear yellow urine   Neurological: She is alert.   Skin: Skin is warm and dry. She is not diaphoretic. No pallor.         Significant Labs:    BMP:  Recent Labs   Lab 05/05/19  0412 05/05/19  1706 05/06/19  0409 05/07/19  0330     --  138 138   K 3.6 3.5 3.9 4.2   CL 99  --  100 101   CO2 31*  --  31* 30*   BUN 15  --  22* 26*   CREATININE 0.8  --  0.8 1.0   CALCIUM 8.5*  --  8.2* 8.5*       CBC:   Recent Labs   Lab 05/06/19  0917 05/06/19  2000 05/07/19  0330   WBC 20.00* 13.36* 12.53   HGB 7.7* 7.4* 7.2*   HCT 24.8* 23.5* 23.6*   * 575* 630*       All pertinent labs results from the past 24 hours have been reviewed.    Significant Imaging:  All pertinent imaging results/findings from the past 24 hours have been reviewed.          Assessment/Plan:     * Ureteral transection of left native kidney  Mariann Huff is a 59 y.o. female s/p left ureteral injury on 4/12, presented with fever, AMS, and intraabdominal abscess, taken for washout and ligation of left ureter with neph tube placement on 4/21, Trach/PEG 5/2.    - tube feeds per SICU  - Trach per SICU  - Rocephin, Rifampin, Vancomycin  - Maintain correa, neph tube, and MADHURI drain  - Urine output adequate   - continue ICU care  - Bloody bowel movements have slowed however H/H continues to slowly trend down    Sepsis  As above        VTE Risk Mitigation (From  admission, onward)        Ordered     heparin 25,000 units in dextrose 5% 250 mL (100 units/mL) infusion HIGH INTENSITY nomogram - [facility]  Continuous      05/03/19 2155     Place sequential compression device  Until discontinued      04/20/19 2108     IP VTE HIGH RISK PATIENT  Once      04/20/19 2108          Yulissa Salas MD  Urology  [facility]

## 2019-05-07 NOTE — SUBJECTIVE & OBJECTIVE
Interval History:   No acute events overnight  Denies pain  Denies nausea  1 bloody bowel movement overnight    Review of Systems  Objective:     Temp:  [99.3 °F (37.4 °C)-100.3 °F (37.9 °C)] 99.9 °F (37.7 °C)  Pulse:  [] 106  Resp:  [10-41] 29  SpO2:  [92 %-100 %] 95 %  BP: (103-176)/() 153/75     Body mass index is 29.21 kg/m².           Drains     Drain                 Closed/Suction Drain 04/21/19 0300 LLQ 16 days         Nephrostomy 04/21/19 0629 Left 8 Fr. 16 days         Urethral Catheter 04/20/19 1711 Latex 16 Fr. 16 days         Gastrostomy/Enterostomy 05/02/19 1134 Percutaneous endoscopic gastrostomy (PEG) LUQ decompression;feeding 4 days                Physical Exam   Constitutional: She appears well-developed. No distress.   HENT:   Head: Normocephalic and atraumatic.   Neck: No JVD present.   Cardiovascular: Normal rate and regular rhythm.    Pulmonary/Chest: Effort normal.   On trach collar   Abdominal: Soft. She exhibits no distension. There is no rebound and no guarding.   Incision c/d/i   MADHURI drain with ss output  G-tube   Genitourinary:   Genitourinary Comments: Fontenot draining clear yellow  Left neph tube with clear yellow urine   Neurological: She is alert.   Skin: Skin is warm and dry. She is not diaphoretic. No pallor.         Significant Labs:    BMP:  Recent Labs   Lab 05/05/19  0412 05/05/19  1706 05/06/19  0409 05/07/19  0330     --  138 138   K 3.6 3.5 3.9 4.2   CL 99  --  100 101   CO2 31*  --  31* 30*   BUN 15  --  22* 26*   CREATININE 0.8  --  0.8 1.0   CALCIUM 8.5*  --  8.2* 8.5*       CBC:   Recent Labs   Lab 05/06/19  0917 05/06/19 2000 05/07/19  0330   WBC 20.00* 13.36* 12.53   HGB 7.7* 7.4* 7.2*   HCT 24.8* 23.5* 23.6*   * 575* 630*       All pertinent labs results from the past 24 hours have been reviewed.    Significant Imaging:  All pertinent imaging results/findings from the past 24 hours have been reviewed.

## 2019-05-07 NOTE — SUBJECTIVE & OBJECTIVE
Interval History/Significant Events: 1x bloody BM yesterday. On trach collar 5L FiO2 28%. Phos 2.2. Hgb 7.2.    Follow-up For: Procedure(s) (LRB):  Creation, Nephrostomy, Percutaneous (Left)    Post-Operative Day: 14 Days Post-Op     Objective:     Vital Signs (Most Recent):  Temp: 99.2 °F (37.3 °C) (05/07/19 0700)  Pulse: 101 (05/07/19 1115)  Resp: (!) 22 (05/07/19 1115)  BP: (!) 117/58 (05/07/19 1000)  SpO2: (!) 93 % (05/07/19 1045) Vital Signs (24h Range):  Temp:  [99.2 °F (37.3 °C)-100.3 °F (37.9 °C)] 99.2 °F (37.3 °C)  Pulse:  [] 101  Resp:  [10-34] 22  SpO2:  [76 %-100 %] 93 %  BP: (103-164)/() 117/58     Weight: 77.2 kg (170 lb 3.1 oz)  Body mass index is 29.21 kg/m².      Intake/Output Summary (Last 24 hours) at 5/7/2019 1126  Last data filed at 5/7/2019 0900  Gross per 24 hour   Intake 1359 ml   Output 1183 ml   Net 176 ml       Physical Exam   Constitutional: She appears well-developed and well-nourished.   HENT:   Head: Normocephalic and atraumatic.   Tracheostomy in place   Eyes: Right eye exhibits no discharge. Left eye exhibits no discharge.   Neck: Normal range of motion. Neck supple.   Cardiovascular: Normal rate and regular rhythm.   Pulmonary/Chest: Effort normal. No respiratory distress.   Trach collar.    Abdominal:   Midline incision c/d/i.  MADHURI with scant serous drainage. PEG draining normal gastric contents. Abdomen soft, non-distended. Rectal tube with watery brown output.     Genitourinary:   Genitourinary Comments: Nephrostomy tube in place with watery dark yellow output.  Fontenot catheter+   Musculoskeletal: Normal range of motion.   Neurological:   Able to follow commands, denying pain.    Skin: Skin is warm and dry.   Vitals reviewed.      Lines/Drains/Airways     Drain                 Closed/Suction Drain 04/21/19 0300 LLQ 16 days         Nephrostomy 04/21/19 0629 Left 8 Fr. 16 days         Urethral Catheter 04/20/19 1711 Latex 16 Fr. 16 days         Gastrostomy/Enterostomy  05/02/19 1134 Percutaneous endoscopic gastrostomy (PEG) LUQ decompression;feeding 4 days          Airway                 Surgical Airway 05/02/19 1107 Shiley Cuffed 5 days          Peripheral Intravenous Line                 Peripheral IV - Single Lumen 05/03/19 0244 22 G Left Forearm 4 days         Midline Catheter Insertion/Assessment  - Single Lumen 05/03/19 1706 Left basilic vein (medial side of arm) 18g x 10cm 3 days                Significant Labs:    ABG:  None in the last 48 hrs    CBC/Anemia Profile:  Recent Labs   Lab 05/06/19 2000 05/07/19  0330 05/07/19  0853   WBC 13.36* 12.53 11.26   HGB 7.4* 7.2* 7.1*   HCT 23.5* 23.6* 23.3*   * 630* 583*   MCV 89 88 88   RDW 13.5 13.5 13.7        Chemistries:  Recent Labs   Lab 05/05/19 1706 05/06/19  0409 05/07/19  0330   NA  --  138 138   K 3.5 3.9 4.2   CL  --  100 101   CO2  --  31* 30*   BUN  --  22* 26*   CREATININE  --  0.8 1.0   CALCIUM  --  8.2* 8.5*   ALBUMIN  --  1.7* 1.9*   PROT  --  6.0 6.3   BILITOT  --  1.1* 0.8   ALKPHOS  --  100 99   ALT  --  17 18   AST  --  19 22   MG 2.1 2.1 2.1   PHOS  --  2.4* 2.2*       Significant Imaging:  I have reviewed all pertinent imaging results/findings within the past 24 hours.

## 2019-05-08 PROBLEM — R00.1 BRADYCARDIA, UNSPECIFIED: Status: ACTIVE | Noted: 2019-05-08

## 2019-05-08 LAB
ALBUMIN SERPL BCP-MCNC: 1.8 G/DL (ref 3.5–5.2)
ALBUMIN SERPL BCP-MCNC: 1.9 G/DL (ref 3.5–5.2)
ALLENS TEST: ABNORMAL
ALLENS TEST: NORMAL
ALLENS TEST: NORMAL
ALP SERPL-CCNC: 96 U/L (ref 55–135)
ALP SERPL-CCNC: 97 U/L (ref 55–135)
ALT SERPL W/O P-5'-P-CCNC: 20 U/L (ref 10–44)
ALT SERPL W/O P-5'-P-CCNC: 20 U/L (ref 10–44)
ANION GAP SERPL CALC-SCNC: 10 MMOL/L (ref 8–16)
ANION GAP SERPL CALC-SCNC: 7 MMOL/L (ref 8–16)
APTT BLDCRRT: 22.1 SEC (ref 21–32)
APTT BLDCRRT: 24.4 SEC (ref 21–32)
APTT BLDCRRT: 29.1 SEC (ref 21–32)
ASCENDING AORTA: 3.01 CM
AST SERPL-CCNC: 28 U/L (ref 10–40)
AST SERPL-CCNC: 29 U/L (ref 10–40)
BASOPHILS # BLD AUTO: 0.07 K/UL (ref 0–0.2)
BASOPHILS # BLD AUTO: 0.08 K/UL (ref 0–0.2)
BASOPHILS # BLD AUTO: 0.09 K/UL (ref 0–0.2)
BASOPHILS NFR BLD: 0.5 % (ref 0–1.9)
BASOPHILS NFR BLD: 0.5 % (ref 0–1.9)
BASOPHILS NFR BLD: 0.6 % (ref 0–1.9)
BILIRUB SERPL-MCNC: 0.8 MG/DL (ref 0.1–1)
BILIRUB SERPL-MCNC: 0.9 MG/DL (ref 0.1–1)
BLD PROD TYP BPU: NORMAL
BLOOD UNIT EXPIRATION DATE: NORMAL
BLOOD UNIT TYPE CODE: 5100
BLOOD UNIT TYPE: NORMAL
BSA FOR ECHO PROCEDURE: 1.89 M2
BUN SERPL-MCNC: 29 MG/DL (ref 6–20)
BUN SERPL-MCNC: 31 MG/DL (ref 6–20)
CALCIUM SERPL-MCNC: 8.5 MG/DL (ref 8.7–10.5)
CALCIUM SERPL-MCNC: 8.9 MG/DL (ref 8.7–10.5)
CHLORIDE SERPL-SCNC: 104 MMOL/L (ref 95–110)
CHLORIDE SERPL-SCNC: 104 MMOL/L (ref 95–110)
CO2 SERPL-SCNC: 25 MMOL/L (ref 23–29)
CO2 SERPL-SCNC: 29 MMOL/L (ref 23–29)
CODING SYSTEM: NORMAL
CREAT SERPL-MCNC: 1 MG/DL (ref 0.5–1.4)
CREAT SERPL-MCNC: 1.1 MG/DL (ref 0.5–1.4)
CV ECHO LV RWT: 0.49 CM
DELSYS: ABNORMAL
DELSYS: ABNORMAL
DELSYS: NORMAL
DELSYS: NORMAL
DIFFERENTIAL METHOD: ABNORMAL
DISPENSE STATUS: NORMAL
DOP CALC LVOT AREA: 3.02 CM2
DOP CALC LVOT DIAMETER: 1.96 CM
ECHO LV POSTERIOR WALL: 1 CM (ref 0.6–1.1)
EOSINOPHIL # BLD AUTO: 0.5 K/UL (ref 0–0.5)
EOSINOPHIL # BLD AUTO: 0.6 K/UL (ref 0–0.5)
EOSINOPHIL # BLD AUTO: 0.6 K/UL (ref 0–0.5)
EOSINOPHIL NFR BLD: 3 % (ref 0–8)
EOSINOPHIL NFR BLD: 3.8 % (ref 0–8)
EOSINOPHIL NFR BLD: 4.7 % (ref 0–8)
ERYTHROCYTE [DISTWIDTH] IN BLOOD BY AUTOMATED COUNT: 13.7 % (ref 11.5–14.5)
ERYTHROCYTE [DISTWIDTH] IN BLOOD BY AUTOMATED COUNT: 13.7 % (ref 11.5–14.5)
ERYTHROCYTE [DISTWIDTH] IN BLOOD BY AUTOMATED COUNT: 13.8 % (ref 11.5–14.5)
ERYTHROCYTE [SEDIMENTATION RATE] IN BLOOD BY WESTERGREN METHOD: 18 MM/H
ERYTHROCYTE [SEDIMENTATION RATE] IN BLOOD BY WESTERGREN METHOD: 18 MM/H
ERYTHROCYTE [SEDIMENTATION RATE] IN BLOOD BY WESTERGREN METHOD: 20 MM/H
EST. GFR  (AFRICAN AMERICAN): >60 ML/MIN/1.73 M^2
EST. GFR  (AFRICAN AMERICAN): >60 ML/MIN/1.73 M^2
EST. GFR  (NON AFRICAN AMERICAN): 55.5 ML/MIN/1.73 M^2
EST. GFR  (NON AFRICAN AMERICAN): >60 ML/MIN/1.73 M^2
FIBRINOGEN PPP-MCNC: 593 MG/DL (ref 182–366)
FIBRINOGEN PPP-MCNC: 598 MG/DL (ref 182–366)
FIO2: 100
FRACTIONAL SHORTENING: 17 % (ref 28–44)
GLUCOSE SERPL-MCNC: 140 MG/DL (ref 70–110)
GLUCOSE SERPL-MCNC: 184 MG/DL (ref 70–110)
HCO3 UR-SCNC: 25.7 MMOL/L (ref 24–28)
HCO3 UR-SCNC: 26.4 MMOL/L (ref 24–28)
HCO3 UR-SCNC: 26.5 MMOL/L (ref 24–28)
HCO3 UR-SCNC: 26.6 MMOL/L (ref 24–28)
HCT VFR BLD AUTO: 24.7 % (ref 37–48.5)
HCT VFR BLD AUTO: 26.6 % (ref 37–48.5)
HCT VFR BLD AUTO: 28.3 % (ref 37–48.5)
HCT VFR BLD AUTO: 28.7 % (ref 37–48.5)
HCT VFR BLD CALC: 20 %PCV (ref 36–54)
HCT VFR BLD CALC: 26 %PCV (ref 36–54)
HCT VFR BLD CALC: 31 %PCV (ref 36–54)
HGB BLD-MCNC: 7.7 G/DL (ref 12–16)
HGB BLD-MCNC: 8.1 G/DL (ref 12–16)
HGB BLD-MCNC: 8.7 G/DL (ref 12–16)
HGB BLD-MCNC: 9 G/DL (ref 12–16)
IMM GRANULOCYTES # BLD AUTO: 0.16 K/UL (ref 0–0.04)
IMM GRANULOCYTES # BLD AUTO: 0.17 K/UL (ref 0–0.04)
IMM GRANULOCYTES # BLD AUTO: 0.2 K/UL (ref 0–0.04)
IMM GRANULOCYTES NFR BLD AUTO: 1.1 % (ref 0–0.5)
IMM GRANULOCYTES NFR BLD AUTO: 1.2 % (ref 0–0.5)
IMM GRANULOCYTES NFR BLD AUTO: 1.3 % (ref 0–0.5)
INR PPP: 0.9 (ref 0.8–1.2)
INR PPP: 1 (ref 0.8–1.2)
INTERVENTRICULAR SEPTUM: 1.1 CM (ref 0.6–1.1)
LA MAJOR: 3.27 CM
LA MINOR: 3.68 CM
LA WIDTH: 3.35 CM
LDH SERPL L TO P-CCNC: 0.93 MMOL/L (ref 0.36–1.25)
LDH SERPL L TO P-CCNC: 0.99 MMOL/L (ref 0.36–1.25)
LDH SERPL L TO P-CCNC: 3.1 MMOL/L (ref 0.36–1.25)
LEFT ATRIUM SIZE: 2.18 CM
LEFT ATRIUM VOLUME INDEX: 11.7 ML/M2
LEFT ATRIUM VOLUME: 21.5 CM3
LEFT INTERNAL DIMENSION IN SYSTOLE: 3.37 CM (ref 2.1–4)
LEFT VENTRICLE DIASTOLIC VOLUME INDEX: 39.53 ML/M2
LEFT VENTRICLE DIASTOLIC VOLUME: 72.9 ML
LEFT VENTRICLE MASS INDEX: 75.9 G/M2
LEFT VENTRICLE SYSTOLIC VOLUME INDEX: 25.2 ML/M2
LEFT VENTRICLE SYSTOLIC VOLUME: 46.44 ML
LEFT VENTRICULAR INTERNAL DIMENSION IN DIASTOLE: 4.07 CM (ref 3.5–6)
LEFT VENTRICULAR MASS: 139.93 G
LYMPHOCYTES # BLD AUTO: 1.1 K/UL (ref 1–4.8)
LYMPHOCYTES # BLD AUTO: 1.2 K/UL (ref 1–4.8)
LYMPHOCYTES # BLD AUTO: 1.6 K/UL (ref 1–4.8)
LYMPHOCYTES NFR BLD: 12.4 % (ref 18–48)
LYMPHOCYTES NFR BLD: 6.6 % (ref 18–48)
LYMPHOCYTES NFR BLD: 8 % (ref 18–48)
MAGNESIUM SERPL-MCNC: 1.8 MG/DL (ref 1.6–2.6)
MAGNESIUM SERPL-MCNC: 2.2 MG/DL (ref 1.6–2.6)
MCH RBC QN AUTO: 26.6 PG (ref 27–31)
MCH RBC QN AUTO: 26.7 PG (ref 27–31)
MCH RBC QN AUTO: 27.1 PG (ref 27–31)
MCHC RBC AUTO-ENTMCNC: 30.5 G/DL (ref 32–36)
MCHC RBC AUTO-ENTMCNC: 30.7 G/DL (ref 32–36)
MCHC RBC AUTO-ENTMCNC: 31.4 G/DL (ref 32–36)
MCV RBC AUTO: 86 FL (ref 82–98)
MCV RBC AUTO: 87 FL (ref 82–98)
MCV RBC AUTO: 88 FL (ref 82–98)
MIN VOL: 10
MODE: ABNORMAL
MODE: ABNORMAL
MODE: NORMAL
MODE: NORMAL
MONOCYTES # BLD AUTO: 1.1 K/UL (ref 0.3–1)
MONOCYTES # BLD AUTO: 1.1 K/UL (ref 0.3–1)
MONOCYTES # BLD AUTO: 1.2 K/UL (ref 0.3–1)
MONOCYTES NFR BLD: 6.5 % (ref 4–15)
MONOCYTES NFR BLD: 7.9 % (ref 4–15)
MONOCYTES NFR BLD: 8.5 % (ref 4–15)
NEUTROPHILS # BLD AUTO: 11.8 K/UL (ref 1.8–7.7)
NEUTROPHILS # BLD AUTO: 14 K/UL (ref 1.8–7.7)
NEUTROPHILS # BLD AUTO: 9.4 K/UL (ref 1.8–7.7)
NEUTROPHILS NFR BLD: 72.5 % (ref 38–73)
NEUTROPHILS NFR BLD: 78.7 % (ref 38–73)
NEUTROPHILS NFR BLD: 82.2 % (ref 38–73)
NRBC BLD-RTO: 0 /100 WBC
PCO2 BLDA: 36.1 MMHG (ref 35–45)
PCO2 BLDA: 41 MMHG (ref 35–45)
PCO2 BLDA: 46.1 MMHG (ref 35–45)
PCO2 BLDA: 46.8 MMHG (ref 35–45)
PEEP: 8
PH SMN: 7.36 [PH] (ref 7.35–7.45)
PH SMN: 7.37 [PH] (ref 7.35–7.45)
PH SMN: 7.4 [PH] (ref 7.35–7.45)
PH SMN: 7.47 [PH] (ref 7.35–7.45)
PHOSPHATE SERPL-MCNC: 3.3 MG/DL (ref 2.7–4.5)
PHOSPHATE SERPL-MCNC: 3.9 MG/DL (ref 2.7–4.5)
PIP: 18
PIP: 29
PIP: 29
PLATELET # BLD AUTO: 526 K/UL (ref 150–350)
PLATELET # BLD AUTO: 552 K/UL (ref 150–350)
PLATELET # BLD AUTO: 606 K/UL (ref 150–350)
PMV BLD AUTO: 10.1 FL (ref 9.2–12.9)
PMV BLD AUTO: 10.5 FL (ref 9.2–12.9)
PMV BLD AUTO: 10.7 FL (ref 9.2–12.9)
PO2 BLDA: 300 MMHG (ref 80–100)
PO2 BLDA: 64 MMHG (ref 80–100)
PO2 BLDA: 73 MMHG (ref 80–100)
PO2 BLDA: 75 MMHG (ref 80–100)
POC BE: 1 MMOL/L
POC BE: 3 MMOL/L
POC IONIZED CALCIUM: 1.11 MMOL/L (ref 1.06–1.42)
POC IONIZED CALCIUM: 1.18 MMOL/L (ref 1.06–1.42)
POC IONIZED CALCIUM: 1.23 MMOL/L (ref 1.06–1.42)
POC SATURATED O2: 100 % (ref 95–100)
POC SATURATED O2: 91 % (ref 95–100)
POC SATURATED O2: 94 % (ref 95–100)
POC SATURATED O2: 95 % (ref 95–100)
POC TCO2: 27 MMOL/L (ref 23–27)
POC TCO2: 28 MMOL/L (ref 23–27)
POCT GLUCOSE: 144 MG/DL (ref 70–110)
POCT GLUCOSE: 148 MG/DL (ref 70–110)
POCT GLUCOSE: 157 MG/DL (ref 70–110)
POCT GLUCOSE: 177 MG/DL (ref 70–110)
POCT GLUCOSE: 188 MG/DL (ref 70–110)
POCT GLUCOSE: 204 MG/DL (ref 70–110)
POTASSIUM BLD-SCNC: 3.7 MMOL/L (ref 3.5–5.1)
POTASSIUM BLD-SCNC: 3.8 MMOL/L (ref 3.5–5.1)
POTASSIUM BLD-SCNC: 4 MMOL/L (ref 3.5–5.1)
POTASSIUM SERPL-SCNC: 4.1 MMOL/L (ref 3.5–5.1)
POTASSIUM SERPL-SCNC: 4.3 MMOL/L (ref 3.5–5.1)
PROT SERPL-MCNC: 6.2 G/DL (ref 6–8.4)
PROT SERPL-MCNC: 6.4 G/DL (ref 6–8.4)
PROTHROMBIN TIME: 10.2 SEC (ref 9–12.5)
PROTHROMBIN TIME: 9.9 SEC (ref 9–12.5)
PROVIDER CREDENTIALS: ABNORMAL
PROVIDER NOTIFIED: ABNORMAL
RA MAJOR: 3.32 CM
RA PRESSURE: 3 MMHG
RA WIDTH: 3.14 CM
RBC # BLD AUTO: 3.04 M/UL (ref 4–5.4)
RBC # BLD AUTO: 3.26 M/UL (ref 4–5.4)
RBC # BLD AUTO: 3.32 M/UL (ref 4–5.4)
SAMPLE: ABNORMAL
SAMPLE: NORMAL
SAMPLE: NORMAL
SINUS: 2.78 CM
SITE: ABNORMAL
SITE: NORMAL
SITE: NORMAL
SODIUM BLD-SCNC: 141 MMOL/L (ref 136–145)
SODIUM BLD-SCNC: 141 MMOL/L (ref 136–145)
SODIUM BLD-SCNC: 142 MMOL/L (ref 136–145)
SODIUM SERPL-SCNC: 139 MMOL/L (ref 136–145)
SODIUM SERPL-SCNC: 140 MMOL/L (ref 136–145)
SP02: 100
STJ: 2.55 CM
TDI LATERAL: 0.05
TDI SEPTAL: 0.05
TDI: 0.05
TRANS ERYTHROCYTES VOL PATIENT: NORMAL ML
VERBAL RESULT READBACK PERFORMED: YES
VT: 400
WBC # BLD AUTO: 12.89 K/UL (ref 3.9–12.7)
WBC # BLD AUTO: 15.01 K/UL (ref 3.9–12.7)
WBC # BLD AUTO: 17.09 K/UL (ref 3.9–12.7)

## 2019-05-08 PROCEDURE — 87205 SMEAR GRAM STAIN: CPT

## 2019-05-08 PROCEDURE — 25500020 PHARM REV CODE 255: Performed by: UROLOGY

## 2019-05-08 PROCEDURE — 93010 EKG 12-LEAD: ICD-10-PCS | Mod: ,,, | Performed by: INTERNAL MEDICINE

## 2019-05-08 PROCEDURE — 85025 COMPLETE CBC W/AUTO DIFF WBC: CPT | Mod: 91

## 2019-05-08 PROCEDURE — 85610 PROTHROMBIN TIME: CPT | Mod: 91

## 2019-05-08 PROCEDURE — 83605 ASSAY OF LACTIC ACID: CPT

## 2019-05-08 PROCEDURE — 27200188 HC TRANSDUCER, ART ADULT/PEDS

## 2019-05-08 PROCEDURE — 84132 ASSAY OF SERUM POTASSIUM: CPT

## 2019-05-08 PROCEDURE — 94761 N-INVAS EAR/PLS OXIMETRY MLT: CPT

## 2019-05-08 PROCEDURE — 93010 ELECTROCARDIOGRAM REPORT: CPT | Mod: ,,, | Performed by: INTERNAL MEDICINE

## 2019-05-08 PROCEDURE — 36600 WITHDRAWAL OF ARTERIAL BLOOD: CPT

## 2019-05-08 PROCEDURE — 93005 ELECTROCARDIOGRAM TRACING: CPT

## 2019-05-08 PROCEDURE — 80053 COMPREHEN METABOLIC PANEL: CPT

## 2019-05-08 PROCEDURE — 85014 HEMATOCRIT: CPT

## 2019-05-08 PROCEDURE — 36556 PR INSERT NON-TUNNEL CV CATH 5+ YRS OLD: ICD-10-PCS | Mod: GC,,, | Performed by: SURGERY

## 2019-05-08 PROCEDURE — 99291 CRITICAL CARE FIRST HOUR: CPT | Mod: ,,, | Performed by: SURGERY

## 2019-05-08 PROCEDURE — 25000003 PHARM REV CODE 250: Performed by: STUDENT IN AN ORGANIZED HEALTH CARE EDUCATION/TRAINING PROGRAM

## 2019-05-08 PROCEDURE — 63600175 PHARM REV CODE 636 W HCPCS: Performed by: STUDENT IN AN ORGANIZED HEALTH CARE EDUCATION/TRAINING PROGRAM

## 2019-05-08 PROCEDURE — 36620 INSERTION CATHETER ARTERY: CPT | Mod: GC,,, | Performed by: SURGERY

## 2019-05-08 PROCEDURE — 37799 UNLISTED PX VASCULAR SURGERY: CPT

## 2019-05-08 PROCEDURE — 84100 ASSAY OF PHOSPHORUS: CPT | Mod: 91

## 2019-05-08 PROCEDURE — 36556 INSERT NON-TUNNEL CV CATH: CPT | Mod: GC,,, | Performed by: SURGERY

## 2019-05-08 PROCEDURE — 27000221 HC OXYGEN, UP TO 24 HOURS

## 2019-05-08 PROCEDURE — 85018 HEMOGLOBIN: CPT

## 2019-05-08 PROCEDURE — 63600175 PHARM REV CODE 636 W HCPCS

## 2019-05-08 PROCEDURE — 20000000 HC ICU ROOM

## 2019-05-08 PROCEDURE — 94002 VENT MGMT INPAT INIT DAY: CPT

## 2019-05-08 PROCEDURE — 99292 PR CRITICAL CARE, ADDL 30 MIN: ICD-10-PCS | Mod: ,,, | Performed by: SURGERY

## 2019-05-08 PROCEDURE — 36620 PR INSERT CATH,ART,PERCUT,SHORTTERM: ICD-10-PCS | Mod: GC,,, | Performed by: SURGERY

## 2019-05-08 PROCEDURE — 84295 ASSAY OF SERUM SODIUM: CPT

## 2019-05-08 PROCEDURE — 87070 CULTURE OTHR SPECIMN AEROBIC: CPT

## 2019-05-08 PROCEDURE — 80053 COMPREHEN METABOLIC PANEL: CPT | Mod: 91

## 2019-05-08 PROCEDURE — 31622 DX BRONCHOSCOPE/WASH: CPT | Mod: 51,,, | Performed by: SURGERY

## 2019-05-08 PROCEDURE — 83735 ASSAY OF MAGNESIUM: CPT

## 2019-05-08 PROCEDURE — P9021 RED BLOOD CELLS UNIT: HCPCS

## 2019-05-08 PROCEDURE — 25000003 PHARM REV CODE 250

## 2019-05-08 PROCEDURE — 99900026 HC AIRWAY MAINTENANCE (STAT)

## 2019-05-08 PROCEDURE — 84100 ASSAY OF PHOSPHORUS: CPT

## 2019-05-08 PROCEDURE — 25000242 PHARM REV CODE 250 ALT 637 W/ HCPCS: Performed by: STUDENT IN AN ORGANIZED HEALTH CARE EDUCATION/TRAINING PROGRAM

## 2019-05-08 PROCEDURE — 85730 THROMBOPLASTIN TIME PARTIAL: CPT

## 2019-05-08 PROCEDURE — 85610 PROTHROMBIN TIME: CPT

## 2019-05-08 PROCEDURE — 82803 BLOOD GASES ANY COMBINATION: CPT

## 2019-05-08 PROCEDURE — 25000003 PHARM REV CODE 250: Performed by: NURSE PRACTITIONER

## 2019-05-08 PROCEDURE — 36620 INSERTION CATHETER ARTERY: CPT

## 2019-05-08 PROCEDURE — C9113 INJ PANTOPRAZOLE SODIUM, VIA: HCPCS | Performed by: STUDENT IN AN ORGANIZED HEALTH CARE EDUCATION/TRAINING PROGRAM

## 2019-05-08 PROCEDURE — 99900035 HC TECH TIME PER 15 MIN (STAT)

## 2019-05-08 PROCEDURE — 99291 PR CRITICAL CARE, E/M 30-74 MINUTES: ICD-10-PCS | Mod: ,,, | Performed by: SURGERY

## 2019-05-08 PROCEDURE — 25000003 PHARM REV CODE 250: Performed by: SURGERY

## 2019-05-08 PROCEDURE — 31622 ARTERIAL LINE: ICD-10-PCS | Mod: 51,,, | Performed by: SURGERY

## 2019-05-08 PROCEDURE — 99232 PR SUBSEQUENT HOSPITAL CARE,LEVL II: ICD-10-PCS | Mod: ,,, | Performed by: NURSE PRACTITIONER

## 2019-05-08 PROCEDURE — 99292 CRITICAL CARE ADDL 30 MIN: CPT | Mod: ,,, | Performed by: SURGERY

## 2019-05-08 PROCEDURE — 85384 FIBRINOGEN ACTIVITY: CPT

## 2019-05-08 PROCEDURE — 99232 SBSQ HOSP IP/OBS MODERATE 35: CPT | Mod: ,,, | Performed by: NURSE PRACTITIONER

## 2019-05-08 PROCEDURE — 83735 ASSAY OF MAGNESIUM: CPT | Mod: 91

## 2019-05-08 PROCEDURE — 63600175 PHARM REV CODE 636 W HCPCS: Performed by: SURGERY

## 2019-05-08 PROCEDURE — 36430 TRANSFUSION BLD/BLD COMPNT: CPT

## 2019-05-08 PROCEDURE — 36556 INSERT NON-TUNNEL CV CATH: CPT

## 2019-05-08 PROCEDURE — 63600175 PHARM REV CODE 636 W HCPCS: Performed by: NURSE PRACTITIONER

## 2019-05-08 PROCEDURE — 82330 ASSAY OF CALCIUM: CPT

## 2019-05-08 RX ORDER — HEPARIN SODIUM,PORCINE/D5W 25000/250
12 INTRAVENOUS SOLUTION INTRAVENOUS CONTINUOUS
Status: DISCONTINUED | OUTPATIENT
Start: 2019-05-08 | End: 2019-05-08

## 2019-05-08 RX ORDER — EPINEPHRINE 1 MG/ML
0.1 INJECTION, SOLUTION INTRACARDIAC; INTRAMUSCULAR; INTRAVENOUS; SUBCUTANEOUS ONCE
Status: COMPLETED | OUTPATIENT
Start: 2019-05-08 | End: 2019-05-08

## 2019-05-08 RX ORDER — ATROPINE SULFATE 0.1 MG/ML
INJECTION INTRAVENOUS
Status: DISPENSED
Start: 2019-05-08 | End: 2019-05-09

## 2019-05-08 RX ORDER — LIDOCAINE HYDROCHLORIDE 10 MG/ML
1 INJECTION INFILTRATION; PERINEURAL ONCE
Status: COMPLETED | OUTPATIENT
Start: 2019-05-08 | End: 2019-05-08

## 2019-05-08 RX ORDER — FUROSEMIDE 10 MG/ML
INJECTION INTRAMUSCULAR; INTRAVENOUS
Status: COMPLETED
Start: 2019-05-08 | End: 2019-05-08

## 2019-05-08 RX ORDER — FENTANYL CITRATE 50 UG/ML
50 INJECTION, SOLUTION INTRAMUSCULAR; INTRAVENOUS
Status: DISCONTINUED | OUTPATIENT
Start: 2019-05-08 | End: 2019-05-08

## 2019-05-08 RX ORDER — EPINEPHRINE 1 MG/ML
0.1 INJECTION, SOLUTION INTRACARDIAC; INTRAMUSCULAR; INTRAVENOUS; SUBCUTANEOUS ONCE
Status: DISCONTINUED | OUTPATIENT
Start: 2019-05-08 | End: 2019-05-09

## 2019-05-08 RX ORDER — PANTOPRAZOLE SODIUM 40 MG/10ML
40 INJECTION, POWDER, LYOPHILIZED, FOR SOLUTION INTRAVENOUS DAILY
Status: DISCONTINUED | OUTPATIENT
Start: 2019-05-08 | End: 2019-05-17

## 2019-05-08 RX ORDER — POTASSIUM CHLORIDE 29.8 MG/ML
40 INJECTION INTRAVENOUS
Status: DISCONTINUED | OUTPATIENT
Start: 2019-05-08 | End: 2019-05-20

## 2019-05-08 RX ORDER — MIDAZOLAM HYDROCHLORIDE 1 MG/ML
INJECTION INTRAMUSCULAR; INTRAVENOUS
Status: COMPLETED
Start: 2019-05-08 | End: 2019-05-08

## 2019-05-08 RX ORDER — VANCOMYCIN HCL IN 5 % DEXTROSE 1G/250ML
1000 PLASTIC BAG, INJECTION (ML) INTRAVENOUS
Status: DISCONTINUED | OUTPATIENT
Start: 2019-05-08 | End: 2019-05-09

## 2019-05-08 RX ORDER — FENTANYL CITRATE 50 UG/ML
INJECTION, SOLUTION INTRAMUSCULAR; INTRAVENOUS
Status: COMPLETED
Start: 2019-05-08 | End: 2019-05-08

## 2019-05-08 RX ORDER — SODIUM CHLORIDE, SODIUM LACTATE, POTASSIUM CHLORIDE, CALCIUM CHLORIDE 600; 310; 30; 20 MG/100ML; MG/100ML; MG/100ML; MG/100ML
INJECTION, SOLUTION INTRAVENOUS CONTINUOUS
Status: DISCONTINUED | OUTPATIENT
Start: 2019-05-08 | End: 2019-05-14

## 2019-05-08 RX ORDER — ATROPINE SULFATE 0.1 MG/ML
1 INJECTION INTRAVENOUS ONCE
Status: COMPLETED | OUTPATIENT
Start: 2019-05-08 | End: 2019-05-08

## 2019-05-08 RX ORDER — CHLORHEXIDINE GLUCONATE ORAL RINSE 1.2 MG/ML
15 SOLUTION DENTAL 2 TIMES DAILY
Status: DISCONTINUED | OUTPATIENT
Start: 2019-05-08 | End: 2019-06-05 | Stop reason: HOSPADM

## 2019-05-08 RX ORDER — MAGNESIUM SULFATE HEPTAHYDRATE 40 MG/ML
2 INJECTION, SOLUTION INTRAVENOUS
Status: DISCONTINUED | OUTPATIENT
Start: 2019-05-08 | End: 2019-06-05 | Stop reason: HOSPADM

## 2019-05-08 RX ORDER — INSULIN ASPART 100 [IU]/ML
1-10 INJECTION, SOLUTION INTRAVENOUS; SUBCUTANEOUS EVERY 6 HOURS PRN
Status: DISCONTINUED | OUTPATIENT
Start: 2019-05-08 | End: 2019-05-10

## 2019-05-08 RX ORDER — ACETYLCYSTEINE 200 MG/ML
600 SOLUTION ORAL; RESPIRATORY (INHALATION) 2 TIMES DAILY
Status: DISCONTINUED | OUTPATIENT
Start: 2019-05-08 | End: 2019-05-08

## 2019-05-08 RX ORDER — HEPARIN SODIUM,PORCINE/D5W 25000/250
17 INTRAVENOUS SOLUTION INTRAVENOUS CONTINUOUS
Status: DISCONTINUED | OUTPATIENT
Start: 2019-05-08 | End: 2019-05-10

## 2019-05-08 RX ORDER — MIDAZOLAM HYDROCHLORIDE 1 MG/ML
2 INJECTION INTRAMUSCULAR; INTRAVENOUS
Status: DISCONTINUED | OUTPATIENT
Start: 2019-05-08 | End: 2019-05-08

## 2019-05-08 RX ORDER — FUROSEMIDE 10 MG/ML
120 INJECTION INTRAMUSCULAR; INTRAVENOUS ONCE
Status: COMPLETED | OUTPATIENT
Start: 2019-05-08 | End: 2019-05-08

## 2019-05-08 RX ORDER — GLUCAGON 1 MG
1 KIT INJECTION
Status: DISCONTINUED | OUTPATIENT
Start: 2019-05-08 | End: 2019-05-10

## 2019-05-08 RX ORDER — ACETYLCYSTEINE 200 MG/ML
600 SOLUTION ORAL; RESPIRATORY (INHALATION) 3 TIMES DAILY
Status: COMPLETED | OUTPATIENT
Start: 2019-05-08 | End: 2019-05-08

## 2019-05-08 RX ORDER — FENTANYL CITRATE-0.9 % NACL/PF 10 MCG/ML
PLASTIC BAG, INJECTION (ML) INTRAVENOUS CONTINUOUS
Status: DISCONTINUED | OUTPATIENT
Start: 2019-05-08 | End: 2019-05-13

## 2019-05-08 RX ORDER — PROPOFOL 10 MG/ML
15 INJECTION, EMULSION INTRAVENOUS CONTINUOUS
Status: DISCONTINUED | OUTPATIENT
Start: 2019-05-08 | End: 2019-05-13

## 2019-05-08 RX ORDER — LIDOCAINE HYDROCHLORIDE 10 MG/ML
INJECTION, SOLUTION EPIDURAL; INFILTRATION; INTRACAUDAL; PERINEURAL
Status: COMPLETED
Start: 2019-05-08 | End: 2019-05-08

## 2019-05-08 RX ORDER — MAGNESIUM SULFATE HEPTAHYDRATE 40 MG/ML
4 INJECTION, SOLUTION INTRAVENOUS
Status: DISCONTINUED | OUTPATIENT
Start: 2019-05-08 | End: 2019-06-05 | Stop reason: HOSPADM

## 2019-05-08 RX ADMIN — MAGNESIUM SULFATE IN WATER 2 G: 40 INJECTION, SOLUTION INTRAVENOUS at 01:05

## 2019-05-08 RX ADMIN — INSULIN ASPART 2 UNITS: 100 INJECTION, SOLUTION INTRAVENOUS; SUBCUTANEOUS at 05:05

## 2019-05-08 RX ADMIN — SODIUM CHLORIDE, SODIUM LACTATE, POTASSIUM CHLORIDE, AND CALCIUM CHLORIDE 500 ML: .6; .31; .03; .02 INJECTION, SOLUTION INTRAVENOUS at 01:05

## 2019-05-08 RX ADMIN — EPINEPHRINE 0.01 MCG/KG/MIN: 1 INJECTION INTRAMUSCULAR; INTRAVENOUS; SUBCUTANEOUS at 06:05

## 2019-05-08 RX ADMIN — EPINEPHRINE 0.1 MG: 1 INJECTION, SOLUTION INTRAMUSCULAR; SUBCUTANEOUS at 02:05

## 2019-05-08 RX ADMIN — ACETYLCYSTEINE 600 MG: 200 INHALANT RESPIRATORY (INHALATION) at 11:05

## 2019-05-08 RX ADMIN — NYSTATIN AND TRIAMCINOLONE ACETONIDE: 100000; 1 CREAM TOPICAL at 09:05

## 2019-05-08 RX ADMIN — CHLORHEXIDINE GLUCONATE 0.12% ORAL RINSE 15 ML: 1.2 LIQUID ORAL at 09:05

## 2019-05-08 RX ADMIN — CHLORHEXIDINE GLUCONATE 0.12% ORAL RINSE 15 ML: 1.2 LIQUID ORAL at 08:05

## 2019-05-08 RX ADMIN — FUROSEMIDE 120 MG: 10 INJECTION INTRAMUSCULAR; INTRAVENOUS at 06:05

## 2019-05-08 RX ADMIN — VANCOMYCIN HYDROCHLORIDE 1000 MG: 1 INJECTION, POWDER, LYOPHILIZED, FOR SOLUTION INTRAVENOUS at 11:05

## 2019-05-08 RX ADMIN — FENTANYL CITRATE 50 MCG: 50 INJECTION, SOLUTION INTRAMUSCULAR; INTRAVENOUS at 07:05

## 2019-05-08 RX ADMIN — HEPARIN SODIUM AND DEXTROSE 12 UNITS/KG/HR: 10000; 5 INJECTION INTRAVENOUS at 11:05

## 2019-05-08 RX ADMIN — SODIUM CHLORIDE, SODIUM LACTATE, POTASSIUM CHLORIDE, AND CALCIUM CHLORIDE: .6; .31; .03; .02 INJECTION, SOLUTION INTRAVENOUS at 01:05

## 2019-05-08 RX ADMIN — PROPOFOL 25 MCG/KG/MIN: 10 INJECTION, EMULSION INTRAVENOUS at 06:05

## 2019-05-08 RX ADMIN — ATROPINE SULFATE 1 MG: 0.1 INJECTION PARENTERAL at 03:05

## 2019-05-08 RX ADMIN — PANTOPRAZOLE SODIUM 40 MG: 40 INJECTION, POWDER, FOR SOLUTION INTRAVENOUS at 01:05

## 2019-05-08 RX ADMIN — MIDAZOLAM HYDROCHLORIDE 2 MG: 1 INJECTION INTRAMUSCULAR; INTRAVENOUS at 06:05

## 2019-05-08 RX ADMIN — PIPERACILLIN AND TAZOBACTAM 4.5 G: 4; .5 INJECTION, POWDER, LYOPHILIZED, FOR SOLUTION INTRAVENOUS; PARENTERAL at 08:05

## 2019-05-08 RX ADMIN — LIDOCAINE HYDROCHLORIDE 1 ML: 10 INJECTION INFILTRATION; PERINEURAL at 11:05

## 2019-05-08 RX ADMIN — MIDAZOLAM HYDROCHLORIDE 2 MG: 1 INJECTION, SOLUTION INTRAMUSCULAR; INTRAVENOUS at 06:05

## 2019-05-08 RX ADMIN — Medication 50 MCG/HR: at 08:05

## 2019-05-08 RX ADMIN — PIPERACILLIN AND TAZOBACTAM 4.5 G: 4; .5 INJECTION, POWDER, LYOPHILIZED, FOR SOLUTION INTRAVENOUS; PARENTERAL at 11:05

## 2019-05-08 RX ADMIN — SODIUM CHLORIDE 1.5 UNITS/HR: 9 INJECTION, SOLUTION INTRAVENOUS at 05:05

## 2019-05-08 RX ADMIN — RIFAMPIN 300 MG: 600 INJECTION, POWDER, LYOPHILIZED, FOR SOLUTION INTRAVENOUS at 03:05

## 2019-05-08 RX ADMIN — ACETYLCYSTEINE 600 MG: 200 INHALANT RESPIRATORY (INHALATION) at 02:05

## 2019-05-08 RX ADMIN — LIDOCAINE HYDROCHLORIDE 1 ML: 10 INJECTION, SOLUTION EPIDURAL; INFILTRATION; INTRACAUDAL; PERINEURAL at 11:05

## 2019-05-08 RX ADMIN — PROPOFOL 15 MCG/KG/MIN: 10 INJECTION, EMULSION INTRAVENOUS at 06:05

## 2019-05-08 RX ADMIN — RIFAMPIN 300 MG: 600 INJECTION, POWDER, LYOPHILIZED, FOR SOLUTION INTRAVENOUS at 02:05

## 2019-05-08 RX ADMIN — FENTANYL CITRATE 50 MCG: 50 INJECTION INTRAMUSCULAR; INTRAVENOUS at 07:05

## 2019-05-08 RX ADMIN — IOHEXOL 75 ML: 350 INJECTION, SOLUTION INTRAVENOUS at 09:05

## 2019-05-08 RX ADMIN — INSULIN ASPART 2 UNITS: 100 INJECTION, SOLUTION INTRAVENOUS; SUBCUTANEOUS at 08:05

## 2019-05-08 RX ADMIN — POTASSIUM CHLORIDE 40 MEQ: 400 INJECTION, SOLUTION INTRAVENOUS at 04:05

## 2019-05-08 RX ADMIN — HEPARIN SODIUM AND DEXTROSE 12 UNITS/KG/HR: 10000; 5 INJECTION INTRAVENOUS at 07:05

## 2019-05-08 RX ADMIN — FUROSEMIDE 120 MG: 10 INJECTION, SOLUTION INTRAMUSCULAR; INTRAVENOUS at 06:05

## 2019-05-08 NOTE — PROCEDURES
"Mariann Huff is a 59 y.o. female patient.    Temp: 99.4 °F (37.4 °C) (05/08/19 1500)  Pulse: 89 (05/08/19 1753)  Resp: 20 (05/08/19 1753)  BP: (!) 117/58 (05/08/19 1753)  SpO2: 99 % (05/08/19 1753)  Weight: 79 kg (174 lb 2.6 oz) (05/08/19 0700)  Height: 5' 4" (162.6 cm) (05/08/19 0700)       Arterial Line  Date/Time: 5/8/2019 7:11 AM  Location procedure was performed: [facility] (SICU)  Performed by: Zoë Du MD  Authorized by: Zoë Du MD   Assisting provider: David Sagastume MD  Consent Done: Emergent Situation  Preparation: Patient was prepped and draped in the usual sterile fashion.  Indications: multiple ABGs and hemodynamic monitoring  Location: right femoral  Anesthesia: local infiltration    Anesthesia:  Local Anesthetic: lidocaine 1% without epinephrine  Anesthetic total: 3 mL  Patient sedated: yes  Sedation type: moderate (conscious) sedation  (See MAR for exact dosages of medications).  Sedatives: see MAR for details  Analgesia: see MAR for details  Vitals: Vital signs were monitored during sedation.  Description of findings: pulsatile flow    Needle gauge: 20  Seldinger technique: Seldinger technique used  Cutdown: cutdown required  Number of attempts: 1  Technical procedures used: Ultrasound guidance  Complications: No  Estimated blood loss (mL): 2  Specimens: No  Implants: No  Post-procedure: line sutured and dressing applied  Post-procedure CMS: normal  Patient tolerance: Patient tolerated the procedure well with no immediate complications    Central Line  Date/Time: 5/8/2019 7:24 AM  Location procedure was performed: [facility] (SICU)  Performed by: Zoë Du MD  Supervising provider: David Sagastume  Assisting provider: David Sagastume MD  Consent Done: Emergent Situation  Time out: Immediately prior to procedure a "time out" was called to verify the correct patient, procedure, equipment, support staff and site/side marked as required.  Indications: med " administration and vascular access  Anesthesia: local infiltration    Anesthesia:  Local Anesthetic: lidocaine 1% with epinephrine  Anesthetic total: 3 mL  Preparation: skin prepped with ChloraPrep  Skin prep agent dried: skin prep agent completely dried prior to procedure  Sterile barriers: all five maximum sterile barriers used - cap, mask, sterile gown, sterile gloves, and large sterile sheet  Hand hygiene: hand hygiene performed prior to central venous catheter insertion  Location details: right femoral  Site selection rationale: hemodynamically unstable, would not tolerate flat positioning  Catheter type: triple lumen  Catheter size: 7 Fr  Ultrasound guidance: yes  Vessel Caliber: large, patent, compressibility normal  Needle advanced into vessel with real time Ultrasound guidance.  Guidewire confirmed in vessel.  Sterile sheath used.  Manometry: No   Number of attempts: 1  Complications: none  Estimated blood loss (mL): 2  Specimens: No  Implants: No  Post-procedure: line sutured,  chlorhexidine patch,  sterile dressing applied and blood return through all ports  Complications: No          Zoë Du  5/8/2019

## 2019-05-08 NOTE — EICU
bushra received from bedside for SB 37.  1 mg atropine given  102/52, ventilation by ambu bag @ 100% FiO2 via trache.  Dr Sagastume at bedside.  Heparin gtt infusing @ 14u/kg/hr.   1452:  39 SB, 77/27, 0.1 mg epinephrine given per MD  1455:  116 ST, 179/73, 80% spO2 on 100% FiO2 per ambu  1457:  81 SR, 121/44, 20, 89% spO2, 100% FiO2/8 peep per mechanical ventilation  1500:  84 SR, 104/44, 20, 96% on 100% FiO2/8 peep.  RN remains at bedside

## 2019-05-08 NOTE — PROCEDURES
PREOPERATIVE DIAGNOSES:   Respiratory failure     POSTOPERATIVE DIAGNOSES:   Respiratory failure     PROCEDURES PERFORMED:   Bronchoscopy with suction     Surgeon(s) and Role:     * David Sagastume MD     * Zoë Du MD     Informed consent:  The nature of the surgery and possible benefits explained to and appreciated by patient. The multiple complications and adverse outcomes including:surgical infection, pneumonia, air leak / fluid drainage from the lung requiring prolonged chest tube drainage, intra-thoracic abscess, bleeding, lung tumor or infection/ bleeding recurrence, cardiac arrhythmias, myocardial infarction, renal failure, prolonged gastrointestinal infection or dysfunction, respiratory failure, pulmonary embolus, thrombophlebitis, multiple organ failure, stroke, and prolonged hospital / extended care facility stay, acute and chronic pain, the need for new chronic medical care, along with other unsuspected problems, understood and appreciated by patient who voluntarily and in an informed state of mind agreed to this surgical intervention.      Operation Detail:  After surgical time out and general understanding of the nature was established, the patient underwent adequate analgesia utilizing fentanyl.      Fiberoptic Bronchoscopy was the performed introducing the bronchoscopy through the endotracheal tube. Bronchoscopy revealed clear right and left main bronchi to the level of their first and second divisions. Clear secretions were present. No evidence of purulence or gastric aspirations were observed. No tissue was biopsied. BAL cx sample was sent.     INTRAOPERATIVE COMPLICATIONS: No complications     ESTIMATED BLOOD LOSS: 0 mL     BLOOD REPLACEMENT:  0 units blood 0 units fresh frozen 0 units platelets     Specimens:   BAL sample sent     Zoë Du MD  Critical Care - Surgery

## 2019-05-08 NOTE — PT/OT/SLP PROGRESS
Occupational Therapy      Patient Name:  Mariann Huff   MRN:  [id number]    Patient not seen today secondary to (Pt hold). Pt intubated in AM. Will follow-up tomorrow if medically appropriate to participate.    Ana Miller OT  5/8/2019

## 2019-05-08 NOTE — PLAN OF CARE
Problem: Adult Inpatient Plan of Care  Goal: Patient-Specific Goal (Individualization)  Dx: Urosepsis  Hx: HTN, DM, CAD, NSTEMI s/p stent 2014, and back pain who [place] [facility] [place] status and sepsis.     4/12: L5-S1 OLIF with NSGY-intraoperative left ureteral injury with ureteroureteral anastomosis and ureteral stent placement. Did well and was discharged home  4/20: ED for AMS, temp 103, tachy  4/21: Admitted to SICU after emergent ex-lap, IR for L nephrostomy placement, levo, vaso, insulin, propofol, blood cultures, flotrac, 3L LR, 800 isolyte, 2 units PRBC, 5L bolus  4/22: 500 albumin, vaso off  4/23: levo off  4/24: switched to precedex, blood culture  4/25:-4/29: SBT trials failed   4/30:  Blood cx.'s x2, VAP (BAL) cx  5/1: T-max: 101.7; CT Chest/ABD/pelvis w/contrast  5/2: Trach and PEG placed today, Accuchecks changed to Q2H.  5/3: US for right arm due to swelling. Midline placed, Trach collar trial successful, Trickle PEG tube feeds started. Heparing gtt started.   5/5: Bloody BMs x 4, H/H dropping  5/6: 1 unit PRBC's  5/7: Heparin gtt started.       Nursing:  - MAP >65, SBP <180   - Accuchecks Q4H  - PTT goal 35-45; PTT labs Q6H                         Outcome: Revised  Pt continues to tolerate ATC at 5L 28% FiO2 without difficulty.  Pt tolerating tube feeding at goal 40ml/hr.  Scant UOP per correa, nephrostomy tube output as noted.  Insulin gtt remains at 1.5units/hr per stepdown protocol.  PRBC x1 unit transfused w/o difficulty.  Pt with small tan, loose, incontinent BM but no bloody BM throughout shift.  Plan of care reviewed with pt and spouse at bedside.  Questions encouraged and addressed.  Positive reinforcement provided.  Understanding verbalized.

## 2019-05-08 NOTE — PT/OT/SLP PROGRESS
Physical Therapy      Patient Name:  Mariann Huff   MRN:  [id number]    Patient not seen today secondary to (pt on hold 2nd to recent intubation.). Will follow-up at a later date..    Cathy Taylor, PT   ,5/8/2019

## 2019-05-08 NOTE — PROGRESS NOTES
MD Mitesh/MD Florian/Pharmacy at bedside.  RT also at bedside.  Per Mitesh, Decr. FiO2 to 90% and Sats > 92 are appropriate.  MAP > 60 also appropriate.  APTT 30-45 goal per Mitesh.  If able to Decr. FiO2 throughout morning plans to do Bronch. This afternoon.  Per Mitesh, turn off Insulin gtts as pt. Is NPO.    +3 min:  MD Tyrone at bedside    + 2 min:  ALLISON Louie at bedside      Uriah Holder NP. Being paged to update on inuslin gtts.

## 2019-05-08 NOTE — PT/OT/SLP PROGRESS
Speech Language Pathology      Mariann Huff  MRN: [id number]    Patient not seen today secondary to hold as patient was intubated in AM, discussed with RN. ST will f/u for further PMSV trials when patient is medically appropriate.     Tristan Robert CF-SLP  Speech-Language Pathology  Pager: [phone]

## 2019-05-08 NOTE — ASSESSMENT & PLAN NOTE
Mariann Huff is a 59 y.o. female s/p left ureteral injury on 4/12, presented with fever, AMS, and intraabdominal abscess, taken for washout and ligation of left ureter with neph tube placement on 4/21, Trach/PEG 5/2.    - tube feeds per SICU  - Rocephin, Rifampin, Vancomycin  - Maintain correa, neph tube, and MADHURI drain  - Urine output adequate   - continue ICU care  - Bloody bowel movements have slowed; H/H improved after transfusion  - episode of hypoxemia and bradycardia requiring bag mask ventilation, patient placed on vent at 100% FiO2; wean per SICU  - CTA ordered to evaluate for PE, other pulmonary abnormalities; patient started on heparin gtt

## 2019-05-08 NOTE — NURSING
Responded to bedside alarm, family not at bedside, pt noted with legs through siderail a feet were on the floor with upper body still in the bed.  Pt returned to bed, position adjusted for comfort.  Pt reminded that she cannot get out of bed without assistance.  Bed alarm set, family updated when they returned to the room.

## 2019-05-08 NOTE — EICU
Called into room via E-Alert to find patient was being bagged by bedside nurse via Ambubag. Patient had pink frothy sputum from trach, with bradycardia and hypertension, and oxygen saturations were no picking up. After about a minute of bagging, patient became more alert, HR increased to >100, and oxygen saturations was noted to have increased to 95%. Respiratory Therapist to bedside to continue bagging patient and Ventilator brought to bedside, as patient continues to need to be bagged to maintain oxygen saturations. MD to bedside for orders for ABG, EKG and xray. Patient appears anxious, but is responding appropriately at this time. Tube feeding stopped. MD awaiting results from labs and imaging for further evaluation and medical decisions.

## 2019-05-08 NOTE — SUBJECTIVE & OBJECTIVE
"Interval HPI:   Overnight events:  Hemodynamic complications noted overnight per chart reveiw. TF stopped overnight. Patient has been taken off of IV insulin infusion per primary care team. However, BG is now trending upward. Patient is possible scheduled for Bronch dx procedure today as notified per primary care nurse.     Eating:   NPO  Nausea: No  Hypoglycemia and intervention: No  Fever: No  TPN and/or TF: No  If yes, type of TF/TPN and rate: None (hold)    /61 (BP Location: Right arm, Patient Position: Lying)   Pulse 104   Temp 99.8 °F (37.7 °C) (Oral)   Resp (!) 33   Ht 5' 4" (1.626 m)   Wt 79 kg (174 lb 2.6 oz)   SpO2 (!) 94%   Breastfeeding? No   BMI 29.90 kg/m²     Labs Reviewed and Include    Recent Labs   Lab 05/08/19  0513   *   CALCIUM 8.9   ALBUMIN 1.9*   PROT 6.4      K 4.3   CO2 29      BUN 29*   CREATININE 1.0   ALKPHOS 96   ALT 20   AST 28   BILITOT 0.9     Lab Results   Component Value Date    WBC 12.89 (H) 05/08/2019    HGB 8.7 (L) 05/08/2019    HCT 26 (L) 05/08/2019    MCV 87 05/08/2019     (H) 05/08/2019     No results for input(s): TSH, FREET4 in the last 168 hours.  Lab Results   Component Value Date    HGBA1C 10.8 (H) 02/19/2019       Nutritional status:   Body mass index is 29.9 kg/m².  Lab Results   Component Value Date    ALBUMIN 1.9 (L) 05/08/2019    ALBUMIN 1.9 (L) 05/07/2019    ALBUMIN 1.7 (L) 05/06/2019     No results found for: PREALBUMIN    Estimated Creatinine Clearance: 62.3 mL/min (based on SCr of 1 mg/dL).    Accu-Checks  Recent Labs     05/06/19  2100 05/07/19  0000 05/07/19  0337 05/07/19  0734 05/07/19  1300 05/07/19  1633 05/07/19  1952 05/07/19  2312 05/08/19  0515 05/08/19  0746   POCTGLUCOSE 154* 194* 229* 182* 155* 161* 142* 149* 144* 204*       Current Medications and/or Treatments Impacting Glycemic Control  Immunotherapy:    Immunosuppressants     None        Steroids:   Hormones (From admission, onward)    None        Pressors: "    Autonomic Drugs (From admission, onward)    None        Hyperglycemia/Diabetes Medications:   Antihyperglycemics (From admission, onward)    Start     Stop Route Frequency Ordered    05/07/19 1100  insulin aspart U-100 pen 0-5 Units      -- SubQ As needed (PRN) 05/07/19 0952    05/03/19 0945  insulin regular (Humulin R) 100 Units in sodium chloride 0.9% 100 mL infusion      -- IV Continuous 05/03/19 0899

## 2019-05-08 NOTE — PROGRESS NOTES
[facility]  Urology  Progress Note    Patient Name: Mariann Huff  MRN: [id number]  Admission Date: 4/20/2019  Hospital Length of Stay: 18 days  Code Status: Full Code   Attending Provider: Robin Boyd MD   Primary Care Physician: Jasbir Haney MD    Subjective:     HPI:  Mariann Huff is a 59 y.o. female with history of HTN, type 2 diabetes mellitus, CAD, NSTEMI, and back pain who presents to [facility] with altered mental status and sepsis. She underwent L5-S1 OLIF with NSGY on 4/12 and had intraoperative left ureteral injury with ureteroureteral anastomosis and ureteral stent placement. She did well initially and had correa and MADHURI drain removed on 4/16. She began having chills and altered mental status 2 days ago and this has progressively worsened. No complaints of pain.     She is altered and HPI is limited. In the ED she is febrile to 103 and tachycardic and pressures are low normal. WBC is 5, creatinine is 3.6 baseline 1.0, lactate is 4.6. Cath UA concerning for infection, 3+ LE, >100 WBCs, and many bacteria on microscopy.    CT and MRI abdomen both show air with minimal fluid near the surgical site with left ureter coursing through. There is air throughout the proximal collecting system which is decompressed with JJ ureteral stent in good position.    Taken for ex lap, ligation of left ureter and left neph tube placement on 4/21/19.    Interval History: This AM, patient developed coughing; trach was suction, with pink frothy sputum coming out. She desaturated to 74% became bradycardic to the 30s and was bag mask ventilated. She was placed on a ventilator at 100% FiO2. Urine output has been 800 cc from the left nephrostomy tube, 453 cc from the urethral catheter. Cr stable at 1.0. 45 cc from MADHURI drain, appears serous. Hgb increased 6.7 -> 8.7  after 1U pRBCs yesterday.     Review of Systems  Objective:     Temp:  [98 °F (36.7 °C)-100 °F (37.8 °C)] 99.8 °F (37.7 °C)  Pulse:  [] 104  Resp:   [14-59] 33  SpO2:  [69 %-100 %] 94 %  BP: ()/() 111/61     Body mass index is 29.9 kg/m².    Date 05/08/19 0700 - 05/09/19 0659   Shift 6349-2481 6339-8948 7773-0799 24 Hour Total   INTAKE   Shift Total(mL/kg)       OUTPUT   Urine(mL/kg/hr) 185   185   Shift Total(mL/kg) 185(2.3)   185(2.3)   Weight (kg) 79 79 79 79          Drains     Drain                 Closed/Suction Drain 04/21/19 0300 LLQ 17 days         Nephrostomy 04/21/19 0629 Left 8 Fr. 17 days         Urethral Catheter 04/20/19 1711 Latex 16 Fr. 17 days         Gastrostomy/Enterostomy 05/02/19 1134 Percutaneous endoscopic gastrostomy (PEG) LUQ decompression;feeding 5 days                Physical Exam   Constitutional: She appears well-developed. No distress.   HENT:   Head: Normocephalic and atraumatic.   Neck: No JVD present.   Cardiovascular: Normal rate and regular rhythm.    Pulmonary/Chest:   On ventilator   Abdominal: Soft. She exhibits no distension. There is no rebound and no guarding.   Incision c/d/i   MADHURI drain with ss output  G-tube   Genitourinary:   Genitourinary Comments: Fontenot draining clear yellow  Left neph tube with clear yellow urine   Neurological: She is alert.   Skin: Skin is warm and dry. She is not diaphoretic. No pallor.         Significant Labs:    BMP:  Recent Labs   Lab 05/06/19  0409 05/07/19  0330 05/08/19  0513    138 140   K 3.9 4.2 4.3    101 104   CO2 31* 30* 29   BUN 22* 26* 29*   CREATININE 0.8 1.0 1.0   CALCIUM 8.2* 8.5* 8.9       CBC:   Recent Labs   Lab 05/07/19  0853 05/07/19  2311 05/08/19  0514 05/08/19  0621 05/08/19  0749   WBC 11.26 11.62 12.89*  --   --    HGB 7.1* 6.7* 8.7*  --   --    HCT 23.3* 22.0* 28.3* 31* 26*   * 565* 552*  --   --        Significant Imaging:  All pertinent imaging results/findings from the past 24 hours have been reviewed.                  Assessment/Plan:     * Ureteral transection of left native kidney  Mariann Huff is a 59 y.o. female s/p left  ureteral injury on 4/12, presented with fever, AMS, and intraabdominal abscess, taken for washout and ligation of left ureter with neph tube placement on 4/21, Trach/PEG 5/2.    - tube feeds per SICU  - Rocephin, Rifampin, Vancomycin  - Maintain correa, neph tube, and MADHURI drain  - Urine output adequate   - continue ICU care  - Bloody bowel movements have slowed; H/H improved after transfusion  - episode of hypoxemia and bradycardia requiring bag mask ventilation, patient placed on vent at 100% FiO2; wean per SICU  - CTA ordered to evaluate for PE, other pulmonary abnormalities; patient started on heparin gtt    Sepsis  As above        VTE Risk Mitigation (From admission, onward)        Ordered     heparin 25,000 units in dextrose 5% 250 mL (100 units/mL) infusion LOW INTENSITY nomogram - [facility]  Continuous      05/08/19 0744     Place sequential compression device  Until discontinued      04/20/19 2108     IP VTE HIGH RISK PATIENT  Once      04/20/19 2108          Mook Ferguson MD  Urology  [facility]

## 2019-05-08 NOTE — PROGRESS NOTES
Pt taken to CT on portable vent. Bagged while in scanner due to asynchrony with portable vent while laying flat.

## 2019-05-08 NOTE — PROGRESS NOTES
Pharmacokinetic Initial Assessment: IV Vancomycin    Assessment/Plan:    - Initiate vancomycin 1000 mg q12 hours.  Patient was previously therapeutic on this regimen.   - Goal trough concentration is 15 to 20 mg/dL for possible pneumonia.  - Vancomycin trough level has been ordered prior to the 3rd dose on 5/9 at 10:00.    Pharmacy will continue to follow and monitor vancomycin.      Please contact pharmacy at extension [phone] with any questions regarding this assessment.     Thank you for the consult,   Marilou Jones, PharmD, BCCCP     Patient brief summary:  Mariann Huff is a 59 y.o. female initiated on antimicrobial therapy with IV Vancomycin for treatment of suspected lower respiratory infection.    Drug Allergies:   Review of patient's allergies indicates:  No Known Allergies    Actual Body Weight:   79 kg    Renal Function:   Estimated Creatinine Clearance: 56.7 mL/min (based on SCr of 1.1 mg/dL).,     Dialysis Method (if applicable):  Not applicable    CBC (last 72 hours):  Recent Labs   Lab Result Units 05/05/19  1653 05/05/19  2156 05/06/19  0409 05/06/19  0917 05/06/19  2000 05/07/19  0330 05/07/19  0853 05/07/19  2311 05/08/19  0514 05/08/19  0936   WBC K/uL 11.05 10.28 10.89 20.00* 13.36* 12.53 11.26 11.62 12.89* 17.09*   Hemoglobin g/dL 7.2* 7.1* 6.7* 7.7* 7.4* 7.2* 7.1* 6.7* 8.7* 9.0*   Hematocrit % 23.0* 22.5* 22.4* 24.8* 23.5* 23.6* 23.3* 22.0* 28.3* 28.7*   Platelets K/uL 563* 559* 624* 595* 575* 630* 583* 565* 552* 606*   Gran% % 70.7 66.5 71.3 80.5* 69.3 72.0 71.2 67.4 72.5 82.2*   Lymph% % 14.1* 16.9* 13.7* 8.0* 14.6* 12.4* 12.5* 15.9* 12.4* 6.6*   Mono% % 9.9 10.4 9.4 7.7 10.3 9.0 9.4 9.4 8.5 6.5   Eosinophil% % 3.8 4.6 4.4 2.6 4.1 4.8 5.2 5.5 4.7 3.0   Basophil% % 0.5 0.7 0.4 0.5 0.6 0.6 0.5 0.6 0.6 0.5   Differential Method  Automated Automated Automated Automated Automated Automated Automated Automated Automated Automated       Metabolic Panel (last 72 hours):  Recent Labs   Lab  Result Units 05/05/19  1706 05/06/19  0409 05/07/19  0330 05/08/19  0513 05/08/19  0936   Sodium mmol/L  --  138 138 140 139   Potassium mmol/L 3.5 3.9 4.2 4.3 4.1   Chloride mmol/L  --  100 101 104 104   CO2 mmol/L  --  31* 30* 29 25   Glucose mg/dL  --  167* 198* 140* 184*   BUN, Bld mg/dL  --  22* 26* 29* 31*   Creatinine mg/dL  --  0.8 1.0 1.0 1.1   Albumin g/dL  --  1.7* 1.9* 1.9* 1.8*   Total Bilirubin mg/dL  --  1.1* 0.8 0.9 0.8   Alkaline Phosphatase U/L  --  100 99 96 97   AST U/L  --  19 22 28 29   ALT U/L  --  17 18 20 20   Magnesium mg/dL 2.1 2.1 2.1 2.2 1.8   Phosphorus mg/dL  --  2.4* 2.2* 3.3 3.9       Drug levels (last 3 results):  No results for input(s): VANCOMYCINRA, VANCOMYCINPE, VANCOMYCINTR in the last 72 hours.    Microbiologic Results:  Microbiology Results (last 7 days)     Procedure Component Value Units Date/Time    Aerobic culture [[id number]]  (Susceptibility) Collected:  04/21/19 0125    Order Status:  Completed Specimen:  Body Fluid from Abdomen Updated:  05/06/19 1532     Aerobic Bacterial Culture --     STAPHYLOCOCCUS LUGDUNENSIS  Rare      Narrative:       Retroperitoneal fluid    Clostridium difficile EIA [[id number]] Collected:  05/05/19 1132    Order Status:  Completed Specimen:  Stool Updated:  05/05/19 1535     C. diff Antigen Negative     C difficile Toxins A+B, EIA Negative     Comment: Testing not recommended for children <24 months old.       Narrative:       [id number]    Blood culture [[id number]] Collected:  04/30/19 0230    Order Status:  Completed Specimen:  Blood from Peripheral, Hand, Right Updated:  05/05/19 0612     Blood Culture, Routine No growth after 5 days.    Blood culture [[id number]] Collected:  04/30/19 0247    Order Status:  Completed Specimen:  Blood from Peripheral, Wrist, Right Updated:  05/05/19 0612     Blood Culture, Routine No growth after 5 days.    Culture, VAP (BAL) [[id number]] Collected:  04/30/19 0900    Order Status:  Completed Specimen:  Bronchial  Alveolar Lavage from BAL, RUL Updated:  05/02/19 1342     VAP BAL CULTURE No growth     Gram Stain (Respiratory) <10 epithelial cells per low power field.     Gram Stain (Respiratory) No WBC's     Gram Stain (Respiratory) No organisms seen    Clostridium difficile EIA [[id number]] Collected:  05/01/19 1230    Order Status:  Completed Specimen:  Stool Updated:  05/01/19 2110     C. diff Antigen Negative     C difficile Toxins A+B, EIA Negative     Comment: Testing not recommended for children <24 months old.

## 2019-05-08 NOTE — PROGRESS NOTES
"[facility]  Endocrinology  Progress Note    Admit Date: 4/20/2019     Reason for Consult: Management of T2DM, Hyperglycemia     Surgical Procedure and Date:       04/21/2019:         1. Exploratory laparotomy        2. Ligation of left ureter        3. Removal of left JJ ureteral stent      Diabetes diagnosis year: ANDRE    Home Diabetes Medications:  ANDRE  Toujeo 50 BID  Invokana 300mg daily   Novolog 35 units AM, 45 units PM, 35 units PM    How often checking glucose at home? ANDRE   BG readings on regimen: ANDRE  Hypoglycemia on the regimen?  ANDRE  Missed doses on regimen?  ANDRE    Diabetes Complications include:     Hyperglycemia and Diabetic retinopathy     Complicating diabetes co morbidities:   History of MI and MURTAZA, CAD, HLD    HPI:   Patient is a 59 y.o. female with a diagnosis of HTN, HLN, DM type 2, nonproliferative diabetic renopathy, CAD, NSTEMI, and back pain who presents to the ED with a complaint of altered mental status on 04/20/2019. Is now s/p Exploratory laparotomy, Ligation of left ureter, and Removal of left JJ ureteral stent. Endocrinology consulted to manage DM2/Hyperglycemia.    Lab Results   Component Value Date    HGBA1C 10.8 (H) 02/19/2019       Interval HPI:   Overnight events:  Hemodynamic complications noted overnight per chart reveiw. TF stopped overnight. Patient has been taken off of IV insulin infusion per primary care team. However, BG is now trending upward. Patient is possible scheduled for Bronch dx procedure today as notified per primary care nurse.     Eating:   NPO  Nausea: No  Hypoglycemia and intervention: No  Fever: No  TPN and/or TF: No  If yes, type of TF/TPN and rate: None (hold)    /61 (BP Location: Right arm, Patient Position: Lying)   Pulse 104   Temp 99.8 °F (37.7 °C) (Oral)   Resp (!) 33   Ht 5' 4" (1.626 m)   Wt 79 kg (174 lb 2.6 oz)   SpO2 (!) 94%   Breastfeeding? No   BMI 29.90 kg/m²      Labs Reviewed and Include    Recent Labs   Lab " 05/08/19  0513   *   CALCIUM 8.9   ALBUMIN 1.9*   PROT 6.4      K 4.3   CO2 29      BUN 29*   CREATININE 1.0   ALKPHOS 96   ALT 20   AST 28   BILITOT 0.9     Lab Results   Component Value Date    WBC 12.89 (H) 05/08/2019    HGB 8.7 (L) 05/08/2019    HCT 26 (L) 05/08/2019    MCV 87 05/08/2019     (H) 05/08/2019     No results for input(s): TSH, FREET4 in the last 168 hours.  Lab Results   Component Value Date    HGBA1C 10.8 (H) 02/19/2019       Nutritional status:   Body mass index is 29.9 kg/m².  Lab Results   Component Value Date    ALBUMIN 1.9 (L) 05/08/2019    ALBUMIN 1.9 (L) 05/07/2019    ALBUMIN 1.7 (L) 05/06/2019     No results found for: PREALBUMIN    Estimated Creatinine Clearance: 62.3 mL/min (based on SCr of 1 mg/dL).    Accu-Checks  Recent Labs     05/06/19  2100 05/07/19  0000 05/07/19  0337 05/07/19  0734 05/07/19  1300 05/07/19  1633 05/07/19  1952 05/07/19  2312 05/08/19  0515 05/08/19  0746   POCTGLUCOSE 154* 194* 229* 182* 155* 161* 142* 149* 144* 204*       Current Medications and/or Treatments Impacting Glycemic Control  Immunotherapy:    Immunosuppressants     None        Steroids:   Hormones (From admission, onward)    None        Pressors:    Autonomic Drugs (From admission, onward)    None        Hyperglycemia/Diabetes Medications:   Antihyperglycemics (From admission, onward)    Start     Stop Route Frequency Ordered    05/07/19 1100  insulin aspart U-100 pen 0-5 Units      -- SubQ As needed (PRN) 05/07/19 0952    05/03/19 0945  insulin regular (Humulin R) 100 Units in sodium chloride 0.9% 100 mL infusion      -- IV Continuous 05/03/19 0836          ASSESSMENT and PLAN    * Ureteral transection of left native kidney  Managed per primary team  Avoid hypoglycemia        Type 2 diabetes mellitus with diabetic peripheral angiopathy without gangrene  BG goal 140 - 180    Hemodynamic complications noted overnight per chart reveiw. TF stopped overnight. Patient has been  taken off of IV insulin infusion per primary care team. However, BG is now trending upward. Patient is possible scheduled for Bronch dx procedure today as notified per primary care nurse.     Moderate Dose SQ Insulin Correction Scale.  BG monitoring every 6 hours while NPO.      ** Please notify Endocrine for any change and/or advance in diet/TF**  ** Please call Endocrine for any BG related issues **    Discharge Recommendations:     TBD.             CAD (coronary artery disease)    Managed per primary team  Condition may cause insulin resistance       HLD (hyperlipidemia)  Managed per primary team  Condition may cause insulin resistance         Sleep apnea  May affect BG readings if uncontrolled            Uriah Holder NP  Endocrinology  [facility]

## 2019-05-08 NOTE — PLAN OF CARE
"Problem: Adult Inpatient Plan of Care  Goal: Plan of Care Review  Outcome: Ongoing (interventions implemented as appropriate)  Dx: Ureteral transection of left native kidney    Shift Events: Pt. Ambulated in room, up in chair x 2 hours, tolerated well. Heparin gtt started. New midline placed.    Neuro: AAO x4, Follows Commands and Moves All Extremities    Vital Signs: BP (!) 149/73   Pulse 103   Temp 99.8 °F (37.7 °C) (Oral)   Resp 14   Ht 5' 4" (1.626 m)   Wt 77.2 kg (170 lb 3.1 oz)   SpO2 97%   Breastfeeding? No   BMI 29.21 kg/m²     Diet: NPO and Tube Feeds    Gtts: Insulin and Heparin    Urine Output: Urinary Catheter 20-45 cc/hour    Drains: MADHURI Drain, total output 5 cc / 4 hours     Labs/Accuchecks: Accuchecks ACHS. PTT Q6H.    Skin: Skin tear on left arm, foam dressing CDI. Partial thickness pressure injury to sacrum, cleansed and applied barrier cream. Heels and elbows without breakdown.          "

## 2019-05-08 NOTE — ASSESSMENT & PLAN NOTE
BG goal 140 - 180    Hemodynamic complications noted overnight per chart reveiw. TF stopped overnight. Patient has been taken off of IV insulin infusion per primary care team. However, BG is now trending upward. Patient is possible scheduled for Bronch dx procedure today as notified per primary care nurse.     Moderate Dose SQ Insulin Correction Scale.  BG monitoring every 6 hours while NPO.      ** Please notify Endocrine for any change and/or advance in diet/TF**  ** Please call Endocrine for any BG related issues **    Discharge Recommendations:     TBD.

## 2019-05-08 NOTE — PROGRESS NOTES
This note also relates to the following rows which could not be included:  BP - Cannot attach notes to unvalidated device data    Called by RN for help. Arrived took over bagging, pt in flush pulmonary edema. Suctioned, obtained ABG per physicians orderPlaced pt on a mechanical ventilation on a AC settings. Pt tolerating it well, will continue to monitor.

## 2019-05-08 NOTE — SUBJECTIVE & OBJECTIVE
Interval History: This AM, patient developed coughing; trach was suction, with pink frothy sputum coming out. She desaturated to 74% became bradycardic to the 30s and was bag mask ventilated. She was placed on a ventilator at 100% FiO2. Urine output has been 800 cc from the left nephrostomy tube, 453 cc from the urethral catheter. Cr stable at 1.0. 45 cc from MADHURI drain, appears serous. Hgb increased 6.7 -> 8.7  after 1U pRBCs yesterday.     Review of Systems  Objective:     Temp:  [98 °F (36.7 °C)-100 °F (37.8 °C)] 99.8 °F (37.7 °C)  Pulse:  [] 104  Resp:  [14-59] 33  SpO2:  [69 %-100 %] 94 %  BP: ()/() 111/61     Body mass index is 29.9 kg/m².    Date 05/08/19 0700 - 05/09/19 0659   Shift 4334-6048 0184-4582 1705-9208 24 Hour Total   INTAKE   Shift Total(mL/kg)       OUTPUT   Urine(mL/kg/hr) 185   185   Shift Total(mL/kg) 185(2.3)   185(2.3)   Weight (kg) 79 79 79 79          Drains     Drain                 Closed/Suction Drain 04/21/19 0300 LLQ 17 days         Nephrostomy 04/21/19 0629 Left 8 Fr. 17 days         Urethral Catheter 04/20/19 1711 Latex 16 Fr. 17 days         Gastrostomy/Enterostomy 05/02/19 1134 Percutaneous endoscopic gastrostomy (PEG) LUQ decompression;feeding 5 days                Physical Exam   Constitutional: She appears well-developed. No distress.   HENT:   Head: Normocephalic and atraumatic.   Neck: No JVD present.   Cardiovascular: Normal rate and regular rhythm.    Pulmonary/Chest:   On ventilator   Abdominal: Soft. She exhibits no distension. There is no rebound and no guarding.   Incision c/d/i   MADHURI drain with ss output  G-tube   Genitourinary:   Genitourinary Comments: Fontenot draining clear yellow  Left neph tube with clear yellow urine   Neurological: She is alert.   Skin: Skin is warm and dry. She is not diaphoretic. No pallor.         Significant Labs:    BMP:  Recent Labs   Lab 05/06/19  0409 05/07/19  0330 05/08/19  0513    138 140   K 3.9 4.2 4.3     101 104   CO2 31* 30* 29   BUN 22* 26* 29*   CREATININE 0.8 1.0 1.0   CALCIUM 8.2* 8.5* 8.9       CBC:   Recent Labs   Lab 05/07/19  0853 05/07/19  2311 05/08/19  0514 05/08/19  0621 05/08/19  0749   WBC 11.26 11.62 12.89*  --   --    HGB 7.1* 6.7* 8.7*  --   --    HCT 23.3* 22.0* 28.3* 31* 26*   * 565* 552*  --   --        Significant Imaging:  All pertinent imaging results/findings from the past 24 hours have been reviewed.

## 2019-05-08 NOTE — NURSING
Pt noted with coarse cough, attempted trach suctioning w/o success, inner cannula changed and noted to be clogged with thick mucous.  Pt then indicated that she was hot and having difficulty breathing.  Pink frothy sputum from trach.  ATC placed to 100%.  Pt then noted to be bradycardic in 40's, and SpO2 not reading on monitor.  Ambu bag to 15l Oxygen, and bagged.  EICU called, SICU notifed.  New orders placed and carried out.  Will monitor status, pending CT scan.

## 2019-05-08 NOTE — PROGRESS NOTES
[facility]  Critical Care - Surgery  Progress Note    Patient Name: Mariann Huff  MRN: [id number]  Admission Date: 4/20/2019  Hospital Length of Stay: 18 days  Code Status: Full Code  Attending Provider: Robin Boyd MD  Primary Care Provider: Jasbir Haney MD   Principal Problem: Ureteral transection of left native kidney    Subjective:     Hospital/ICU Course:  The patient has had 2 episodes of bradycardia during the day.  Early in the morning, the patient had difficulty breathing and a mucus plug was found in the inner cannula of the tracheostomy.  She developed bradycardia but never arrested.  She also developed hypotension.  This was followed subsequently after she was bagged (Ambu) with tachypnea and hypertension and tachycardia.  Oxygen saturations were in the low 90s, and the arterial blood gas at that time revealed a significant alveolar arterial gradient with adequate ventilation.  Chest x-ray, which I personally reviewed, revealed what appeared to be a significant fluid overload.  We did perform an echocardiogram urgently, which I personally was present and reviewed the results.  There is no evidence of heart failure.  EKG was reported as normal and there is no evidence that the patient had an acute myocardial event.  The chest x-ray did suggest a significant amount of pulmonary edema. There was also a concern, because the the history of upper body deep venous thrombosis, that this could be a pulmonary embolism.  A CT scan of the chest , which I have personally reviewed show bilateral infiltrates compatible with pulmonary edema of non cardiogenic origin.  It was not possible to rule out a pulmonary embolism.  The patient is anticoagulated currently.  Ultrasound of the lower extremities was done and they do not show any deep venous thrombosis.    The patient did receive some fluids, because of her history of acute kidney injury, which has resolved and because she was receiving IV  contrast.  She has maintained a good urinary output during the day.  She has mostly being hemodynamically stable and her tachycardia resolved.  She remains sedated and on the ventilator.  Follow-up chest x-ray, which I personally reviewed, showed resolving pulmonary edema. Her oxygenation and oxygen saturations improved and we were able to wean the patient FiO2 down to 50%.    Of note, I did perform a bronchoscopy, which failed to reveal any amount of pus or mucus plugging.  There was also no evidence of aspiration.  BAL was performed and was sent.    A 2nd episode of bradycardia was observed in the afternoon.  This responded to the use of atropine and 0.1 mg of epinephrine.  She did have a tachycardic response with this which subsided rapidly.  She remained otherwise hemodynamically stable.  Arterial blood gases, reveal now a PO2 of 300 with adequate pCO2 and pH.  Mild hyperventilation.  She remains comfortable.  I have asked Cardiology to re-evaluate this patient.  For now she remains off pressors.    Neurologically the patient has been intact. She is awake alert and oriented with a nonfocal exam.    Her abdomen remains soft.  She has been NPO for now.  She has not had any further episodes of lower GI bleed.    The patient is having a good urinary output BUN and creatinine have been grossly within normal limits with a slight elevation of BUN.  Electrolytes are being followed and replaced as necessary.    Currently no evidence of infection in this patient.  Empirically I started antibiotics, but will stop them in the next 24-48 hours.    I have had the chance to talk to the patient's  at length and in detail.  I have explained our findings, I will keep him up-to-date as to any progress in understanding the etiology of these episodes and will wait continuing to talk to him as often as necessary.      No new subjective & objective note has been filed under this hospital service since the last note was  generated.    Assessment/Plan:          Critical Care Daily Checklist:    A: Awake: RASS Goal/Actual Goal: RASS Goal: 0-->alert and calm  Actual: [place] Agitation Sedation Scale (RASS): Alert and calm   B: Spontaneous Breathing Trial Performed? Spon. Breathing Trial Initiated?: Initiated(per Dr Sagastume orders) (04/28/19 0741)   C: SAT & SBT Coordinated?  yes                      D: Delirium: CAM-ICU Overall CAM-ICU: Negative   E: Early Mobility Performed? No   F: Feeding Goal: Goals: Patient to receive nutrition by RD follow-up  Status: Nutrition Goal Status: goal met   Current Diet Order   Procedures    Diet NPO Except for: Medication     Order Specific Question:   Except for     Answer:   Medication      AS: Analgesia/Sedation Propofol/fentanyl     T: Thromboembolic Prophylaxis On heparin drip   H: HOB > 300 Yes   U: Stress Ulcer Prophylaxis (if needed) Yes   G: Glucose Control Yes   B: Bowel Function Stool Occurrence: 3   I: Indwelling Catheter (Lines & Fontenot) Necessity Necessary for now.  Following urinary output.       D: De-escalation of Antimicrobials/Pharmacotherapies No.  Following carefully.    Plan for the day/ETD Yes    Code Status:  Family/Goals of Care: Full Code  Remains full code.  Discussed with family at length.     Critical Care Time:  120 min minutes  Critical secondary to Patient has a condition that poses threat to life and bodily function: Acute respiratory failure.  Sean arrhythmias, etiology unclear.    Of note, interpretation of the patient's acute pulmonary edema is difficult.  Due to the etiology of mucous plugging, it is possible that this is a negative pressure induced pulmonary edema, which would explain why it is resolving within hours.  We will continue to follow carefully.     Critical care was time spent personally by me on the following activities: development of treatment plan with patient or surrogate and bedside caregivers, discussions with consultants, evaluation of  patient's response to treatment, examination of patient, ordering and performing treatments and interventions, ordering and review of laboratory studies, ordering and review of radiographic studies, pulse oximetry, re-evaluation of patient's condition.  This critical care time did not overlap with that of any other provider or involve time for any procedures.     David Sagastume MD  Critical Care - Surgery  [facility]

## 2019-05-08 NOTE — HOSPITAL COURSE
The patient has had 2 episodes of bradycardia during the day.  Early in the morning, the patient had difficulty breathing and a mucus plug was found in the inner cannula of the tracheostomy.  She developed bradycardia but never arrested.  She also developed hypotension.  This was followed subsequently after she was bagged (Ambu) with tachypnea and hypertension and tachycardia.  Oxygen saturations were in the low 90s, and the arterial blood gas at that time revealed a significant alveolar arterial gradient with adequate ventilation.  Chest x-ray, which I personally reviewed, revealed what appeared to be a significant fluid overload.  We did perform an echocardiogram urgently, which I personally was present and reviewed the results.  There is no evidence of heart failure.  EKG was reported as normal and there is no evidence that the patient had an acute myocardial event.  The chest x-ray did suggest a significant amount of pulmonary edema. There was also a concern, because the the history of upper body deep venous thrombosis, that this could be a pulmonary embolism.  A CT scan of the chest , which I have personally reviewed show bilateral infiltrates compatible with pulmonary edema of non cardiogenic origin.  It was not possible to rule out a pulmonary embolism.  The patient is anticoagulated currently.  Ultrasound of the lower extremities was done and they do not show any deep venous thrombosis.    The patient did receive some fluids, because of her history of acute kidney injury, which has resolved and because she was receiving IV contrast.  She has maintained a good urinary output during the day.  She has mostly being hemodynamically stable and her tachycardia resolved.  She remains sedated and on the ventilator.  Follow-up chest x-ray, which I personally reviewed, showed resolving pulmonary edema. Her oxygenation and oxygen saturations improved and we were able to wean the patient FiO2 down to 50%.    Of note, I  did perform a bronchoscopy, which failed to reveal any amount of pus or mucus plugging.  There was also no evidence of aspiration.  BAL was performed and was sent.    A 2nd episode of bradycardia was observed in the afternoon.  This responded to the use of atropine and 0.1 mg of epinephrine.  She did have a tachycardic response with this which subsided rapidly.  She remained otherwise hemodynamically stable.  Arterial blood gases, reveal now a PO2 of 300 with adequate pCO2 and pH.  Mild hyperventilation.  She remains comfortable.  I have asked Cardiology to re-evaluate this patient.  For now she remains off pressors.    Neurologically the patient has been intact. She is awake alert and oriented with a nonfocal exam.    Her abdomen remains soft.  She has been NPO for now.  She has not had any further episodes of lower GI bleed.    The patient is having a good urinary output BUN and creatinine have been grossly within normal limits with a slight elevation of BUN.  Electrolytes are being followed and replaced as necessary.    Currently no evidence of infection in this patient.  Empirically I started antibiotics, but will stop them in the next 24-48 hours.    I have had the chance to talk to the patient's  at length and in detail.  I have explained our findings, I will keep him up-to-date as to any progress in understanding the etiology of these episodes and will wait continuing to talk to him as often as necessary.

## 2019-05-09 LAB
ALBUMIN SERPL BCP-MCNC: 1.8 G/DL (ref 3.5–5.2)
ALLENS TEST: ABNORMAL
ALP SERPL-CCNC: 85 U/L (ref 55–135)
ALT SERPL W/O P-5'-P-CCNC: 17 U/L (ref 10–44)
ANION GAP SERPL CALC-SCNC: 7 MMOL/L (ref 8–16)
APTT BLDCRRT: 28.1 SEC (ref 21–32)
APTT BLDCRRT: 32.1 SEC (ref 21–32)
APTT BLDCRRT: 33.7 SEC (ref 21–32)
APTT BLDCRRT: 36.1 SEC (ref 21–32)
ASCENDING AORTA: 2.69 CM
AST SERPL-CCNC: 38 U/L (ref 10–40)
AV INDEX (PROSTH): 0.79
AV MEAN GRADIENT: 2.83 MMHG
AV PEAK GRADIENT: 4.49 MMHG
AV VALVE AREA: 2.48 CM2
AV VELOCITY RATIO: 0.8
BASOPHILS # BLD AUTO: 0.07 K/UL (ref 0–0.2)
BASOPHILS # BLD AUTO: 0.08 K/UL (ref 0–0.2)
BASOPHILS # BLD AUTO: 0.08 K/UL (ref 0–0.2)
BASOPHILS NFR BLD: 0.5 % (ref 0–1.9)
BASOPHILS NFR BLD: 0.6 % (ref 0–1.9)
BASOPHILS NFR BLD: 0.6 % (ref 0–1.9)
BILIRUB SERPL-MCNC: 1 MG/DL (ref 0.1–1)
BSA FOR ECHO PROCEDURE: 1.87 M2
BUN SERPL-MCNC: 31 MG/DL (ref 6–20)
CALCIUM SERPL-MCNC: 8.5 MG/DL (ref 8.7–10.5)
CHLORIDE SERPL-SCNC: 105 MMOL/L (ref 95–110)
CO2 SERPL-SCNC: 27 MMOL/L (ref 23–29)
CREAT SERPL-MCNC: 1.4 MG/DL (ref 0.5–1.4)
CV ECHO LV RWT: 0.34 CM
DELSYS: ABNORMAL
DIFFERENTIAL METHOD: ABNORMAL
DOP CALC AO PEAK VEL: 1.06 M/S
DOP CALC AO VTI: 21.46 CM
DOP CALC LVOT AREA: 3.14 CM2
DOP CALC LVOT DIAMETER: 2 CM
DOP CALC LVOT PEAK VEL: 0.85 M/S
DOP CALC LVOT STROKE VOLUME: 53.22 CM3
DOP CALCLVOT PEAK VEL VTI: 16.95 CM
E WAVE DECELERATION TIME: 145.82 MSEC
E/A RATIO: 1.47
E/E' RATIO: 12.44
ECHO LV POSTERIOR WALL: 0.85 CM (ref 0.6–1.1)
EOSINOPHIL # BLD AUTO: 0.6 K/UL (ref 0–0.5)
EOSINOPHIL # BLD AUTO: 0.7 K/UL (ref 0–0.5)
EOSINOPHIL # BLD AUTO: 0.8 K/UL (ref 0–0.5)
EOSINOPHIL NFR BLD: 4.8 % (ref 0–8)
EOSINOPHIL NFR BLD: 5.1 % (ref 0–8)
EOSINOPHIL NFR BLD: 6.3 % (ref 0–8)
ERYTHROCYTE [DISTWIDTH] IN BLOOD BY AUTOMATED COUNT: 13.9 % (ref 11.5–14.5)
ERYTHROCYTE [DISTWIDTH] IN BLOOD BY AUTOMATED COUNT: 14 % (ref 11.5–14.5)
ERYTHROCYTE [DISTWIDTH] IN BLOOD BY AUTOMATED COUNT: 14.2 % (ref 11.5–14.5)
ERYTHROCYTE [SEDIMENTATION RATE] IN BLOOD BY WESTERGREN METHOD: 20 MM/H
EST. GFR  (AFRICAN AMERICAN): 47.8 ML/MIN/1.73 M^2
EST. GFR  (NON AFRICAN AMERICAN): 41.4 ML/MIN/1.73 M^2
FIO2: 50
FRACTIONAL SHORTENING: 28 % (ref 28–44)
GLUCOSE SERPL-MCNC: 183 MG/DL (ref 70–110)
HCO3 UR-SCNC: 27.5 MMOL/L (ref 24–28)
HCT VFR BLD AUTO: 22.7 % (ref 37–48.5)
HCT VFR BLD AUTO: 23.4 % (ref 37–48.5)
HCT VFR BLD AUTO: 24.2 % (ref 37–48.5)
HGB BLD-MCNC: 7 G/DL (ref 12–16)
HGB BLD-MCNC: 7.2 G/DL (ref 12–16)
HGB BLD-MCNC: 7.4 G/DL (ref 12–16)
IMM GRANULOCYTES # BLD AUTO: 0.15 K/UL (ref 0–0.04)
IMM GRANULOCYTES # BLD AUTO: 0.16 K/UL (ref 0–0.04)
IMM GRANULOCYTES # BLD AUTO: 0.18 K/UL (ref 0–0.04)
IMM GRANULOCYTES NFR BLD AUTO: 1.1 % (ref 0–0.5)
IMM GRANULOCYTES NFR BLD AUTO: 1.3 % (ref 0–0.5)
IMM GRANULOCYTES NFR BLD AUTO: 1.3 % (ref 0–0.5)
INTERVENTRICULAR SEPTUM: 0.76 CM (ref 0.6–1.1)
LA MAJOR: 4.79 CM
LA MINOR: 4.1 CM
LA WIDTH: 3.41 CM
LEFT ATRIUM SIZE: 3.1 CM
LEFT ATRIUM VOLUME INDEX: 21.6 ML/M2
LEFT ATRIUM VOLUME: 39.7 CM3
LEFT INTERNAL DIMENSION IN SYSTOLE: 3.62 CM (ref 2.1–4)
LEFT VENTRICLE DIASTOLIC VOLUME INDEX: 65.74 ML/M2
LEFT VENTRICLE DIASTOLIC VOLUME: 121.05 ML
LEFT VENTRICLE MASS INDEX: 75.6 G/M2
LEFT VENTRICLE SYSTOLIC VOLUME INDEX: 30 ML/M2
LEFT VENTRICLE SYSTOLIC VOLUME: 55.18 ML
LEFT VENTRICULAR INTERNAL DIMENSION IN DIASTOLE: 5.05 CM (ref 3.5–6)
LEFT VENTRICULAR MASS: 139.23 G
LV LATERAL E/E' RATIO: 11.2
LV SEPTAL E/E' RATIO: 14
LYMPHOCYTES # BLD AUTO: 1.3 K/UL (ref 1–4.8)
LYMPHOCYTES # BLD AUTO: 1.3 K/UL (ref 1–4.8)
LYMPHOCYTES # BLD AUTO: 1.4 K/UL (ref 1–4.8)
LYMPHOCYTES NFR BLD: 10 % (ref 18–48)
LYMPHOCYTES NFR BLD: 10 % (ref 18–48)
LYMPHOCYTES NFR BLD: 9.6 % (ref 18–48)
MAGNESIUM SERPL-MCNC: 2.5 MG/DL (ref 1.6–2.6)
MCH RBC QN AUTO: 26.9 PG (ref 27–31)
MCH RBC QN AUTO: 27.2 PG (ref 27–31)
MCH RBC QN AUTO: 27.2 PG (ref 27–31)
MCHC RBC AUTO-ENTMCNC: 30.6 G/DL (ref 32–36)
MCHC RBC AUTO-ENTMCNC: 30.8 G/DL (ref 32–36)
MCHC RBC AUTO-ENTMCNC: 30.8 G/DL (ref 32–36)
MCV RBC AUTO: 87 FL (ref 82–98)
MCV RBC AUTO: 88 FL (ref 82–98)
MCV RBC AUTO: 89 FL (ref 82–98)
MIN VOL: 7
MODE: ABNORMAL
MONOCYTES # BLD AUTO: 1 K/UL (ref 0.3–1)
MONOCYTES # BLD AUTO: 1.2 K/UL (ref 0.3–1)
MONOCYTES # BLD AUTO: 1.2 K/UL (ref 0.3–1)
MONOCYTES NFR BLD: 7.8 % (ref 4–15)
MONOCYTES NFR BLD: 8.5 % (ref 4–15)
MONOCYTES NFR BLD: 9 % (ref 4–15)
MV PEAK A VEL: 0.76 M/S
MV PEAK E VEL: 1.12 M/S
NEUTROPHILS # BLD AUTO: 10.8 K/UL (ref 1.8–7.7)
NEUTROPHILS # BLD AUTO: 9.4 K/UL (ref 1.8–7.7)
NEUTROPHILS # BLD AUTO: 9.7 K/UL (ref 1.8–7.7)
NEUTROPHILS NFR BLD: 73 % (ref 38–73)
NEUTROPHILS NFR BLD: 75.2 % (ref 38–73)
NEUTROPHILS NFR BLD: 75.3 % (ref 38–73)
NRBC BLD-RTO: 0 /100 WBC
PCO2 BLDA: 42.3 MMHG (ref 35–45)
PEEP: 8
PH SMN: 7.42 [PH] (ref 7.35–7.45)
PHOSPHATE SERPL-MCNC: 3.9 MG/DL (ref 2.7–4.5)
PIP: 31
PLATELET # BLD AUTO: 492 K/UL (ref 150–350)
PLATELET # BLD AUTO: 493 K/UL (ref 150–350)
PLATELET # BLD AUTO: 528 K/UL (ref 150–350)
PMV BLD AUTO: 10.6 FL (ref 9.2–12.9)
PMV BLD AUTO: 10.7 FL (ref 9.2–12.9)
PMV BLD AUTO: 10.7 FL (ref 9.2–12.9)
PO2 BLDA: 121 MMHG (ref 80–100)
POC BE: 3 MMOL/L
POC SATURATED O2: 99 % (ref 95–100)
POC TCO2: 29 MMOL/L (ref 23–27)
POCT GLUCOSE: 156 MG/DL (ref 70–110)
POCT GLUCOSE: 163 MG/DL (ref 70–110)
POCT GLUCOSE: 181 MG/DL (ref 70–110)
POCT GLUCOSE: 201 MG/DL (ref 70–110)
POCT GLUCOSE: 220 MG/DL (ref 70–110)
POTASSIUM SERPL-SCNC: 5.1 MMOL/L (ref 3.5–5.1)
PROT SERPL-MCNC: 6.1 G/DL (ref 6–8.4)
RA MAJOR: 3.73 CM
RA WIDTH: 3.13 CM
RBC # BLD AUTO: 2.6 M/UL (ref 4–5.4)
RBC # BLD AUTO: 2.65 M/UL (ref 4–5.4)
RBC # BLD AUTO: 2.72 M/UL (ref 4–5.4)
RIGHT VENTRICULAR END-DIASTOLIC DIMENSION: 2.78 CM
SAMPLE: ABNORMAL
SINUS: 2.41 CM
SITE: ABNORMAL
SODIUM SERPL-SCNC: 139 MMOL/L (ref 136–145)
SP02: 99
STJ: 2.55 CM
TDI LATERAL: 0.1
TDI SEPTAL: 0.08
TDI: 0.09
TRICUSPID ANNULAR PLANE SYSTOLIC EXCURSION: 2.08 CM
VT: 400
WBC # BLD AUTO: 12.54 K/UL (ref 3.9–12.7)
WBC # BLD AUTO: 13.28 K/UL (ref 3.9–12.7)
WBC # BLD AUTO: 14.29 K/UL (ref 3.9–12.7)

## 2019-05-09 PROCEDURE — 94761 N-INVAS EAR/PLS OXIMETRY MLT: CPT

## 2019-05-09 PROCEDURE — 25000003 PHARM REV CODE 250: Performed by: STUDENT IN AN ORGANIZED HEALTH CARE EDUCATION/TRAINING PROGRAM

## 2019-05-09 PROCEDURE — 85025 COMPLETE CBC W/AUTO DIFF WBC: CPT | Mod: 91

## 2019-05-09 PROCEDURE — 20000000 HC ICU ROOM

## 2019-05-09 PROCEDURE — 63600175 PHARM REV CODE 636 W HCPCS: Performed by: STUDENT IN AN ORGANIZED HEALTH CARE EDUCATION/TRAINING PROGRAM

## 2019-05-09 PROCEDURE — 85730 THROMBOPLASTIN TIME PARTIAL: CPT

## 2019-05-09 PROCEDURE — C9113 INJ PANTOPRAZOLE SODIUM, VIA: HCPCS | Performed by: STUDENT IN AN ORGANIZED HEALTH CARE EDUCATION/TRAINING PROGRAM

## 2019-05-09 PROCEDURE — 37799 UNLISTED PX VASCULAR SURGERY: CPT

## 2019-05-09 PROCEDURE — 85730 THROMBOPLASTIN TIME PARTIAL: CPT | Mod: 91

## 2019-05-09 PROCEDURE — 97110 THERAPEUTIC EXERCISES: CPT

## 2019-05-09 PROCEDURE — 99232 SBSQ HOSP IP/OBS MODERATE 35: CPT | Mod: ,,, | Performed by: NURSE PRACTITIONER

## 2019-05-09 PROCEDURE — 84100 ASSAY OF PHOSPHORUS: CPT

## 2019-05-09 PROCEDURE — 27000221 HC OXYGEN, UP TO 24 HOURS

## 2019-05-09 PROCEDURE — 99291 CRITICAL CARE FIRST HOUR: CPT | Mod: GC,,, | Performed by: SURGERY

## 2019-05-09 PROCEDURE — 80053 COMPREHEN METABOLIC PANEL: CPT

## 2019-05-09 PROCEDURE — 99291 PR CRITICAL CARE, E/M 30-74 MINUTES: ICD-10-PCS | Mod: GC,,, | Performed by: SURGERY

## 2019-05-09 PROCEDURE — 99232 PR SUBSEQUENT HOSPITAL CARE,LEVL II: ICD-10-PCS | Mod: ,,, | Performed by: NURSE PRACTITIONER

## 2019-05-09 PROCEDURE — 82803 BLOOD GASES ANY COMBINATION: CPT

## 2019-05-09 PROCEDURE — 99900035 HC TECH TIME PER 15 MIN (STAT)

## 2019-05-09 PROCEDURE — 94003 VENT MGMT INPAT SUBQ DAY: CPT

## 2019-05-09 PROCEDURE — 63600175 PHARM REV CODE 636 W HCPCS: Performed by: UROLOGY

## 2019-05-09 PROCEDURE — 99900026 HC AIRWAY MAINTENANCE (STAT)

## 2019-05-09 PROCEDURE — 83735 ASSAY OF MAGNESIUM: CPT

## 2019-05-09 RX ORDER — LABETALOL HCL 20 MG/4 ML
10 SYRINGE (ML) INTRAVENOUS EVERY 4 HOURS PRN
Status: DISCONTINUED | OUTPATIENT
Start: 2019-05-09 | End: 2019-06-05 | Stop reason: HOSPADM

## 2019-05-09 RX ORDER — FUROSEMIDE 10 MG/ML
40 INJECTION INTRAMUSCULAR; INTRAVENOUS ONCE
Status: COMPLETED | OUTPATIENT
Start: 2019-05-09 | End: 2019-05-09

## 2019-05-09 RX ADMIN — PIPERACILLIN AND TAZOBACTAM 4.5 G: 4; .5 INJECTION, POWDER, LYOPHILIZED, FOR SOLUTION INTRAVENOUS; PARENTERAL at 03:05

## 2019-05-09 RX ADMIN — INSULIN ASPART 2 UNITS: 100 INJECTION, SOLUTION INTRAVENOUS; SUBCUTANEOUS at 12:05

## 2019-05-09 RX ADMIN — RIFAMPIN 300 MG: 600 INJECTION, POWDER, LYOPHILIZED, FOR SOLUTION INTRAVENOUS at 02:05

## 2019-05-09 RX ADMIN — HEPARIN SODIUM AND DEXTROSE 17 UNITS/KG/HR: 10000; 5 INJECTION INTRAVENOUS at 07:05

## 2019-05-09 RX ADMIN — ACETAMINOPHEN 650 MG: 325 TABLET ORAL at 11:05

## 2019-05-09 RX ADMIN — CHLORHEXIDINE GLUCONATE 0.12% ORAL RINSE 15 ML: 1.2 LIQUID ORAL at 09:05

## 2019-05-09 RX ADMIN — FUROSEMIDE 40 MG: 10 INJECTION, SOLUTION INTRAMUSCULAR; INTRAVENOUS at 09:05

## 2019-05-09 RX ADMIN — INSULIN ASPART 1 UNITS: 100 INJECTION, SOLUTION INTRAVENOUS; SUBCUTANEOUS at 11:05

## 2019-05-09 RX ADMIN — INSULIN ASPART 2 UNITS: 100 INJECTION, SOLUTION INTRAVENOUS; SUBCUTANEOUS at 05:05

## 2019-05-09 RX ADMIN — HYDRALAZINE HYDROCHLORIDE 10 MG: 20 INJECTION INTRAMUSCULAR; INTRAVENOUS at 09:05

## 2019-05-09 RX ADMIN — MICONAZOLE NITRATE: 20 OINTMENT TOPICAL at 09:05

## 2019-05-09 RX ADMIN — CEFTRIAXONE 2 G: 2 INJECTION, SOLUTION INTRAVENOUS at 09:05

## 2019-05-09 RX ADMIN — SODIUM CHLORIDE, SODIUM LACTATE, POTASSIUM CHLORIDE, AND CALCIUM CHLORIDE: .6; .31; .03; .02 INJECTION, SOLUTION INTRAVENOUS at 04:05

## 2019-05-09 RX ADMIN — ACETAMINOPHEN 650 MG: 325 TABLET ORAL at 03:05

## 2019-05-09 RX ADMIN — PANTOPRAZOLE SODIUM 40 MG: 40 INJECTION, POWDER, FOR SOLUTION INTRAVENOUS at 09:05

## 2019-05-09 RX ADMIN — LABETALOL HCL IV SOLN PREFILLED SYRINGE 20 MG/4ML (5 MG/ML) 10 MG: 20/4 SOLUTION PREFILLED SYRINGE at 10:05

## 2019-05-09 NOTE — ASSESSMENT & PLAN NOTE
ASSESSMENT/PLAN:   Mariann Huff is a 59 y.o. female s/p left ureteral injury on 4/12, who presented with fever, AMS, and intraabdominal abscess, taken for washout and ligation of left ureter with nephrostomy tube placement on 4/21. S/p Trach/PEG on 5/2. Episode of negative pressure pulmonary edema on 5/8 requiring mechanical ventilation, diuresis and low dose pressor.      Neuro:   - Pain control with fentanyl prn   - Sedation with prop prn     Pulmonary:   - Been tolerating trach collar since 5/3 until episode of flash pulmonary edema on 5/8  - Pulmonary edema likely secondary to occlusion and negative pressure pulmonary edema  - SpO2 % on ventilator  - CXR to follow edema  - ABGs prn, normalized this am     Cardiac:   - HDS at this time.  Pt has been requiring low dose epi  - TTE 5/8 with no evidence of right heart strain or significant valvular pathology, normal LV systolic function, EF 70%     Renal:    - S/p transection of left ureter 4/12 and subsequent ligation and PCT nephrostomy tube placement with IR 4/21  - Renal function improving.    - Monitor I/Os  - NAC administered for renal protection on 5/8     Intake/Output Summary (Last 24 hours) at 5/9/2019 0807  Last data filed at 5/9/2019 0700  Gross per 24 hour   Intake 1943 ml   Output 1336 ml   Net 607 ml          ID:   - Blood cultures 4/12 +E. Coli; sensitivities pending. Repeat Bld Cx show NGTD.   - UCx from 4/20 growing E. Coli; sensitivities are now back. Repeat Cx pending.   - ID following for assistance with abx therapy, currently on Ceftriaxone, Rifampin and vancomycin.  - AF overnight  - WBC trending down     Hem/Onc:   - H/H trending down, likely dilutional  - Cont to monitor     Endocrine:  - DM Type 2 at baseline    - TF held, will likely restart today  - LDSSI  - Endocrine following for assistance with blood glucose control.      Fluids/Electrolytes/Nutrition/GI:   -  Start free water flushes 300 cc q6h  - Replace electrolytes  PRN    # Shock Liver          - Resolved           - Labs continue to show improvement          - Elevated LFTs now normalized          - Thrombocytopenia; plts count improving; plts normalized          - INR normalized to 0.9     # Lactic Acidosis          - Now resolved    # Bloody BM  - No further episodes  - Low threshold to consult CRS if needed     PPx:  - DVT: Lovenox, Hep gtt restarted 5/8        DISPO:  - Continue SICU care                Critical care was time spent personally by me on the following activities: development of treatment plan with patient or surrogate and bedside caregivers, discussions with consultants, evaluation of patient's response to treatment, examination of patient, ordering and performing treatments and interventions, ordering and review of laboratory studies, ordering and review of radiographic studies, pulse oximetry, re-evaluation of patient's condition.  This critical care time did not overlap with that of any other provider or involve time for any procedures.         Zoë Du MD CA[facility]  Critical Care - Surgery

## 2019-05-09 NOTE — PROGRESS NOTES
Pt traveled to CT with 2 RNs, RT, MD Tyrone present.  Pt. Desat. To 80 SpO2 during procedure, pt. Bagged, sats Incr. To 90, procedure finished.    0933: Return from CT, pt. Placed on bedside monitor, connected to vent., VSS at present.  Tyrone remains at bedside throughout travel.

## 2019-05-09 NOTE — PLAN OF CARE
Problem: Adult Inpatient Plan of Care  Goal: Patient-Specific Goal (Individualization)  Dx: Urosepsis  Hx: HTN, DM, CAD, NSTEMI s/p stent 2014, and back pain who presents to [facility] [place] with altered mental status and sepsis.     4/12: L5-S1 OLIF with NSGY-intraoperative left ureteral injury with ureteroureteral anastomosis and ureteral stent placement. Did well and was discharged home  4/20: ED for AMS, temp 103, tachy  4/21: Admitted to SICU after emergent ex-lap, IR for L nephrostomy placement, levo, vaso, insulin, propofol, blood cultures, flotrac, 3L LR, 800 isolyte, 2 units PRBC, 5L bolus  4/22: 500 albumin, vaso off  4/23: levo off  4/24: switched to precedex, blood culture  4/25:-4/29: SBT trials failed   4/30:  Blood cx.'s x2, VAP (BAL) cx  5/1: T-max: 101.7; CT Chest/ABD/pelvis w/contrast  5/2: Trach and PEG placed today, Accuchecks changed to Q2H.  5/3: US for right arm due to swelling. Midline placed, Trach collar trial successful, Trickle PEG tube feeds started. Heparing gtt started.   5/5: Bloody BMs x 4, H/H dropping  5/6: 1 unit PRBC's  5/7: Heparin gtt started.   5/8:  Pulmonary edema, R. Fem. TLC & R. Fem. A-line, CT-chest to r/o PE (neg.), Echo, Bronch., US BLE, CXR, 12-lead EKG.  Epi. gtts started.    Nursing:  - MAP >65, SBP <180   - Accuchecks Q6H  - PTT q6                          Outcome: Revised  Pt progressing, able to wean ventilator to AC 40%, 5 PEEP.  SpO2 maintaining 98% or greater.  Remains on maintenance fluids and heparin gtt.  Heparin titrated per minimal intensity nomogram.  UOP remains minimal. No BM noted throughout shift.  Continuing to trend labwork, especially CBC.  Plan of care reviewed and discussed with pt and spouse.  Understanding verbalized and indicated.  Positive reinforcement provided.

## 2019-05-09 NOTE — PT/OT/SLP PROGRESS
Physical Therapy Treatment    Patient Name:  Mariann Huff   MRN:  [id number]    Recommendations:     Discharge Recommendations:  rehabilitation facility   Discharge Equipment Recommendations: (will determin DME needs closer to discharge.)   Barriers to discharge: Decreased caregiver support family will not be able to assist pt at current functional level.     Assessment:     Mariann Huff is a 59 y.o. female admitted with a medical diagnosis of Ureteral transection of left native kidney.  She presents with the following impairments/functional limitations:  impaired endurance, gait instability, impaired functional mobilty, impaired balance, decreased lower extremity function pt kenyetta treatment well but medical team only approved there exer in bed for treatment session today. Pt will benefit from cont skilled PT 4x/wk. Pt will need inpt rehab when medically stable. Pt is s/p exp lap, ligation L ureter 4/21, trach/PEG 5/2, back surgery 4/12/19 (previous admit) .    Rehab Prognosis: Good; patient would benefit from acute skilled PT services to address these deficits and reach maximum level of function.    Recent Surgery: Procedure(s) (LRB):  CREATION, TRACHEOSTOMY (N/A)  BRONCHOSCOPY (N/A)  EGD, WITH PEG TUBE INSERTION 7 Days Post-Op    Plan:     During this hospitalization, patient to be seen 4 x/week to address the identified rehab impairments via gait training, therapeutic activities, therapeutic exercises and progress toward the following goals:    · Plan of Care Expires:  06/03/19    Subjective     Chief Complaint: pt had no complaints during treatment.   Patient/Family Comments/goals:  To get better and go home.   Pain/Comfort:  · Pain Rating 1: 0/10  · Pain Rating Post-Intervention 1: 0/10      Objective:     Communicated with nurse prior to session.  Patient found supine with telemetry, arterial line, pulse ox (continuous), correa catheter, MADHURI drain, tracheostomy, ventilator, PICC line(CVP, t-tube drain) upon  PT entry to room.     General Precautions: Standard, fall   Orthopedic Precautions:    Braces: LSO     Functional Mobility:  Medical team approved there exer in bed for treatment session today.     AM-PAC 6 CLICK MOBILITY  Turning over in bed (including adjusting bedclothes, sheets and blankets)?: 2  Sitting down on and standing up from a chair with arms (e.g., wheelchair, bedside commode, etc.): 2  Moving from lying on back to sitting on the side of the bed?: 2  Moving to and from a bed to a chair (including a wheelchair)?: 1  Need to walk in hospital room?: 1  Climbing 3-5 steps with a railing?: 1  Basic Mobility Total Score: 9       Therapeutic Activities and Exercises:   pt performed AAROM there exer x 4 extremities x 15 reps in supine.     Patient left supine with all lines intact and call button in reach..    GOALS:   Multidisciplinary Problems     Physical Therapy Goals        Problem: Physical Therapy Goal    Goal Priority Disciplines Outcome Goal Variances Interventions   Physical Therapy Goal     PT, PT/OT      Description:  Goals to be met by: 19    Patient will increase functional independence with mobility by performin. Supine to sit with Moderate Assistance -not met  2. Sit to stand transfer with Minimal Assistance -not met  3. Gait  x 30 feet with Contact Guard Assistance using Rolling Walker. -not met  4. Lower extremity exercise program x15 reps , with assistance as needed-not met                      Time Tracking:     PT Received On: 19  PT Start Time: 743     PT Stop Time: 808  PT Total Time (min): 25 min     Billable Minutes: Therapeutic Exercise 25 min    Treatment Type: Treatment  PT/PTA: PT     PTA Visit Number: 0     Cathy Taylor, PT  2019

## 2019-05-09 NOTE — NURSING
New consult received for sacral assessment.    Moisture dermatis to the buttock appears to be improving. No open areas noted. Scarring seen to coccyx/gluteal cleft.     Discussed with Dr Crump and updated orders for the antifungal cream with barrier.     Wound care to follow PRN.   Lanny Mireles RN CN CN   [phone]    Patient is on an immerse CALLY mattress with heel offloading boots in place. Patient turned with assistance of nursing.     Recommend:  Barrier antifungal cream BID to the perineal area  Q2hour turns  Pulsate mattress

## 2019-05-09 NOTE — ASSESSMENT & PLAN NOTE
BG goal 140 - 180    Moderate Dose SQ Insulin Correction Scale.  BG monitoring every 6 hours while NPO.      ** Please notify Endocrine for any change and/or advance in diet/TF**  ** Please call Endocrine for any BG related issues **    Discharge Recommendations:     TBD.

## 2019-05-09 NOTE — PROGRESS NOTES
"[facility]  Endocrinology  Progress Note    Admit Date: 4/20/2019     Reason for Consult: Management of T2DM, Hyperglycemia     Surgical Procedure and Date:       04/21/2019:         1. Exploratory laparotomy        2. Ligation of left ureter        3. Removal of left JJ ureteral stent      Diabetes diagnosis year: ANDRE    Home Diabetes Medications:  ANDRE  Toujeo 50 BID  Invokana 300mg daily   Novolog 35 units AM, 45 units PM, 35 units PM    How often checking glucose at home? ANDRE   BG readings on regimen: ANDRE  Hypoglycemia on the regimen?  ANDRE  Missed doses on regimen?  ANDRE    Diabetes Complications include:     Hyperglycemia and Diabetic retinopathy     Complicating diabetes co morbidities:   History of MI and MURTAZA, CAD, HLD    HPI:   Patient is a 59 y.o. female with a diagnosis of HTN, HLN, DM type 2, nonproliferative diabetic renopathy, CAD, NSTEMI, and back pain who presents to the ED with a complaint of altered mental status on 04/20/2019. Is now s/p Exploratory laparotomy, Ligation of left ureter, and Removal of left JJ ureteral stent. Endocrinology consulted to manage DM2/Hyperglycemia.    Lab Results   Component Value Date    HGBA1C 10.8 (H) 02/19/2019       Interval HPI:   Overnight events: Remains intubated in SICU. NAEON. BG at or slightly above goal on Novolog SQ correction scale.   Eating:   NPO  Nausea: No  Hypoglycemia and intervention: No  Fever: No  TPN and/or TF: No      /62   Pulse 91   Temp (!) 100.4 °F (38 °C)   Resp (!) 30   Ht 5' 5" (1.651 m)   Wt 76.7 kg (169 lb)   SpO2 99%   Breastfeeding? No   BMI 28.12 kg/m²      Labs Reviewed and Include    Recent Labs   Lab 05/09/19  0426   *   CALCIUM 8.5*   ALBUMIN 1.8*   PROT 6.1      K 5.1   CO2 27      BUN 31*   CREATININE 1.4   ALKPHOS 85   ALT 17   AST 38   BILITOT 1.0     Lab Results   Component Value Date    WBC 13.28 (H) 05/09/2019    HGB 7.4 (L) 05/09/2019    HCT 24.2 (L) 05/09/2019    MCV 89 " 05/09/2019     (H) 05/09/2019     No results for input(s): TSH, FREET4 in the last 168 hours.  Lab Results   Component Value Date    HGBA1C 10.8 (H) 02/19/2019       Nutritional status:   Body mass index is 28.12 kg/m².  Lab Results   Component Value Date    ALBUMIN 1.8 (L) 05/09/2019    ALBUMIN 1.8 (L) 05/08/2019    ALBUMIN 1.9 (L) 05/08/2019     No results found for: PREALBUMIN    Estimated Creatinine Clearance: 44.9 mL/min (based on SCr of 1.4 mg/dL).    Accu-Checks  Recent Labs     05/07/19  1952 05/07/19  2312 05/08/19  0515 05/08/19  0746 05/08/19  0933 05/08/19  1216 05/08/19  1734 05/08/19  2012 05/09/19  0033 05/09/19  0423   POCTGLUCOSE 142* 149* 144* 204* 177* 188* 157* 148* 220* 201*       Current Medications and/or Treatments Impacting Glycemic Control  Immunotherapy:    Immunosuppressants     None        Steroids:   Hormones (From admission, onward)    None        Pressors:    Autonomic Drugs (From admission, onward)    Start     Stop Route Frequency Ordered    05/08/19 1615  EPINEPHrine (ADRENALIN) 5 mg in sodium chloride 0.9% 250 mL infusion     Question Answer Comment   Titrate by: (in mcg/kg/min) 0.02    Titrate interval: (in minutes) 5    Titrate to maintain: (SBP or MAP or Cardiac Index) MAP    Greater than: (in mmHg) 65    Maximum dose: (in mcg/kg/min) 3        -- IV Continuous 05/08/19 1504        Hyperglycemia/Diabetes Medications:   Antihyperglycemics (From admission, onward)    Start     Stop Route Frequency Ordered    05/08/19 1457  insulin aspart U-100 pen 1-10 Units      -- SubQ Every 6 hours PRN 05/08/19 1358          ASSESSMENT and PLAN    * Ureteral transection of left native kidney  Managed per primary team  Avoid hypoglycemia        Type 2 diabetes mellitus with diabetic peripheral angiopathy without gangrene  BG goal 140 - 180    Moderate Dose SQ Insulin Correction Scale.  BG monitoring every 6 hours while NPO.      ** Please notify Endocrine for any change and/or advance in  diet/TF**  ** Please call Endocrine for any BG related issues **    Discharge Recommendations:     TBD.             CAD (coronary artery disease)    Managed per primary team  Condition may cause insulin resistance       Sleep apnea  May affect BG readings if uncontrolled        PAPA (acute kidney injury)  Caution with insulin stacking        HLD (hyperlipidemia)  Managed per primary team  Condition may cause insulin resistance         On enteral nutrition  May cause BG excursions.           Jolanta Morales NP  Endocrinology  [facility]

## 2019-05-09 NOTE — PLAN OF CARE
Problem: Adult Inpatient Plan of Care  Goal: Plan of Care Review  CT, Bronch., CXR, US BLE, completed.  Pt. Awakening to voice, following commands.  NSR, no further episodes of Sean.    Sats. Appropriate, FiO2 50%, PEEP 8  Pt. Remains NPO  UO Decr. Throughout day.  MD's Olive Hansen Ochoa aware.  500 LR bolus x2 given.  R. Fem TLC & R. Fem A-line added this AM.  CVP 10  Insulin at 16 units/kg/hr, Epi at 0.01 mcg/kg/min, LR at 50 mL/hr, Fentanyl/Propofol off

## 2019-05-09 NOTE — SUBJECTIVE & OBJECTIVE
"Interval HPI:   Overnight events: Remains intubated in SICU. NAEON. BG at or slightly above goal on Novolog SQ correction scale.   Eating:   NPO  Nausea: No  Hypoglycemia and intervention: No  Fever: No  TPN and/or TF: No      /62   Pulse 91   Temp (!) 100.4 °F (38 °C)   Resp (!) 30   Ht 5' 5" (1.651 m)   Wt 76.7 kg (169 lb)   SpO2 99%   Breastfeeding? No   BMI 28.12 kg/m²     Labs Reviewed and Include    Recent Labs   Lab 05/09/19  0426   *   CALCIUM 8.5*   ALBUMIN 1.8*   PROT 6.1      K 5.1   CO2 27      BUN 31*   CREATININE 1.4   ALKPHOS 85   ALT 17   AST 38   BILITOT 1.0     Lab Results   Component Value Date    WBC 13.28 (H) 05/09/2019    HGB 7.4 (L) 05/09/2019    HCT 24.2 (L) 05/09/2019    MCV 89 05/09/2019     (H) 05/09/2019     No results for input(s): TSH, FREET4 in the last 168 hours.  Lab Results   Component Value Date    HGBA1C 10.8 (H) 02/19/2019       Nutritional status:   Body mass index is 28.12 kg/m².  Lab Results   Component Value Date    ALBUMIN 1.8 (L) 05/09/2019    ALBUMIN 1.8 (L) 05/08/2019    ALBUMIN 1.9 (L) 05/08/2019     No results found for: PREALBUMIN    Estimated Creatinine Clearance: 44.9 mL/min (based on SCr of 1.4 mg/dL).    Accu-Checks  Recent Labs     05/07/19  1952 05/07/19  2312 05/08/19  0515 05/08/19  0746 05/08/19  0933 05/08/19  1216 05/08/19  1734 05/08/19  2012 05/09/19  0033 05/09/19  0423   POCTGLUCOSE 142* 149* 144* 204* 177* 188* 157* 148* 220* 201*       Current Medications and/or Treatments Impacting Glycemic Control  Immunotherapy:    Immunosuppressants     None        Steroids:   Hormones (From admission, onward)    None        Pressors:    Autonomic Drugs (From admission, onward)    Start     Stop Route Frequency Ordered    05/08/19 1615  EPINEPHrine (ADRENALIN) 5 mg in sodium chloride 0.9% 250 mL infusion     Question Answer Comment   Titrate by: (in mcg/kg/min) 0.02    Titrate interval: (in minutes) 5    Titrate to maintain: " (SBP or MAP or Cardiac Index) MAP    Greater than: (in mmHg) 65    Maximum dose: (in mcg/kg/min) 3        -- IV Continuous 05/08/19 1504        Hyperglycemia/Diabetes Medications:   Antihyperglycemics (From admission, onward)    Start     Stop Route Frequency Ordered    05/08/19 1457  insulin aspart U-100 pen 1-10 Units      -- SubQ Every 6 hours PRN 05/08/19 1355

## 2019-05-09 NOTE — SUBJECTIVE & OBJECTIVE
Interval History/Significant Events: Episode of severe respiratory difficulty yesterday morning likely 2/2 to negative pressure pulmonary edema. Pt placed back on vent at 100% FiO2, sedation increased, emergent femoral arterial and venous access obtained, STAT bedside ECHO revealed findings consistent with hypertensive urgency and flash pulmonary edema, no evidence of right heart strain indicative of PE, CTA PE unhelpful due to poor study and critical condition of patient. Concern for aspiration ruled out with bedside bronchoscopy. Pt received high dose lasix which improved her pulmonary edema significantly. Placed on NAC for renal protection. Placed on very low dose pressor and fluids. Pt did very well overnight, no further episodes.     Follow-up For: Procedure(s) (LRB):  Creation, Nephrostomy, Percutaneous (Left)    Post-Operative Day: 16 Days Post-Op      Objective:     Vital Signs (Most Recent):  Temp: 99.7 °F (37.6 °C) (05/09/19 0700)  Pulse: 83 (05/09/19 0645)  Resp: 20 (05/09/19 0645)  BP: 125/62 (05/09/19 0506)  SpO2: 98 % (05/09/19 0645) Vital Signs (24h Range):  Temp:  [99.4 °F (37.4 °C)-100 °F (37.8 °C)] 99.7 °F (37.6 °C)  Pulse:  [] 83  Resp:  [5-36] 20  SpO2:  [85 %-100 %] 98 %  BP: ()/(50-86) 125/62  Arterial Line BP: ()/() 142/67     Weight: 77 kg (169 lb 12.1 oz)  Body mass index is 29.14 kg/m².      Intake/Output Summary (Last 24 hours) at 5/9/2019 0757  Last data filed at 5/9/2019 0700  Gross per 24 hour   Intake 1943 ml   Output 1336 ml   Net 607 ml       Physical Exam   Constitutional: She appears well-developed and well-nourished.   HENT:   Head: Normocephalic and atraumatic.   Tracheostomy in place   Eyes: Right eye exhibits no discharge. Left eye exhibits no discharge.   Neck: Normal range of motion. Neck supple.   Cardiovascular: Normal rate and regular rhythm.   Pulmonary/Chest: Effort normal. No respiratory distress.   Abdominal:   Midline incision c/d/i.  MADHURI with  scant serous drainage. PEG with no leak. Abdomen soft, non-distended.  Right femoral lines in place, dressing CDI.   Genitourinary:   Genitourinary Comments: Nephrostomy tube in place with watery dark yellow output.  Fontenot catheter+   Musculoskeletal: Normal range of motion.   Neurological:   Able to follow commands, denying pain.    Skin: Skin is warm and dry.   Vitals reviewed.      Lines/Drains/Airways     Central Venous Catheter Line                 Percutaneous Central Line Insertion/Assessment - triple lumen  05/08/19 0746 right femoral 1 day          Drain                 Closed/Suction Drain 04/21/19 0300 LLQ 18 days         Nephrostomy 04/21/19 0629 Left 8 Fr. 18 days         Urethral Catheter 04/20/19 1711 Latex 16 Fr. 18 days         Gastrostomy/Enterostomy 05/02/19 1134 Percutaneous endoscopic gastrostomy (PEG) LUQ decompression;feeding 6 days          Airway                 Surgical Airway 05/02/19 1107 Shiley Cuffed 6 days          Arterial Line                 Arterial Line 05/08/19 0743 Right Femoral 1 day          Peripheral Intravenous Line                 Midline Catheter Insertion/Assessment  - Single Lumen 05/07/19 1632 Left basilic vein (medial side of arm) 18g x 8cm 1 day                Significant Labs:    ABG:  Recent Labs     05/09/19  0424   PH 7.420   PCO2 42.3   PO2 121*   HCO3 27.5   POCSATURATED 99   BE 3       CBC/Anemia Profile:  Recent Labs   Lab 05/08/19  1205  05/08/19  1844 05/09/19  0033 05/09/19  0428   WBC 15.01*  --   --  14.29* 12.54   HGB 8.1*  --  7.7* 7.2* 7.0*   HCT 26.6*   < > 24.7* 23.4* 22.7*   *  --   --  528* 492*   MCV 88  --   --  88 87   RDW 13.7  --   --  13.9 14.0    < > = values in this interval not displayed.        Chemistries:  Recent Labs   Lab 05/08/19  0513 05/08/19  0936 05/09/19  0426    139 139   K 4.3 4.1 5.1    104 105   CO2 29 25 27   BUN 29* 31* 31*   CREATININE 1.0 1.1 1.4   CALCIUM 8.9 8.5* 8.5*   ALBUMIN 1.9* 1.8* 1.8*   PROT  6.4 6.2 6.1   BILITOT 0.9 0.8 1.0   ALKPHOS 96 97 85   ALT 20 20 17   AST 28 29 38   MG 2.2 1.8 2.5   PHOS 3.3 3.9 3.9       Significant Imaging:  I have reviewed all pertinent imaging results/findings within the past 24 hours.

## 2019-05-09 NOTE — PT/OT/SLP PROGRESS
Occupational Therapy      Patient Name:  Mariann Huff   MRN:  [id number]    Patient not seen today secondary to MD hold (Comment). RN contacted MD this morning regarding pt working with therapy after respiratory issues yesterday and pt now on vent; MD did not clear pt for EOB/OOB activity this AM. PT to see today for bed level exercises. Will follow-up tomorrow if pt is medically appropriate for therapy.    Ana Miller, OT  5/9/2019

## 2019-05-09 NOTE — ASSESSMENT & PLAN NOTE
Mariann Huff is a 59 y.o. female s/p left ureteral injury on 4/12, presented with fever, AMS, and intraabdominal abscess, taken for washout and ligation of left ureter with neph tube placement on 4/21, Trach/PEG 5/2.    - tube feeds per SICU  - Rocephin, Rifampin, Vancomycin - wean per SICU   - BAL on 5/8 showed no organisms  - Maintain correa, neph tube, and MADHURI drain  - Urine output adequate   - H/H downtrending; transfusions per SICU  - episode of hypoxemia and bradycardia requiring bag mask ventilation; CT showed significant pulmonary edema, no PE visible (however, dosed inadequately for PE protocol); patient remains on heparin gtt, wean per SICU    Dispo: continue ICU care

## 2019-05-09 NOTE — PROGRESS NOTES
Pt. Had episode of Bradycardia to HR 30's. MD Mitesh and MD Olive at bedside.  RN x3 at bedside. EICU notified.   0.1 mg Epi. Given, 1 mg Atropine given.  Appropriate response in HR, BP noted.  VSS      See GOPAL Anderson note for further details.

## 2019-05-09 NOTE — SUBJECTIVE & OBJECTIVE
Interval History: After bradycardic event yesterday morning, patient obtained CT, which showed no appreciable PE but patient did have significant pulmonary edema. She had a repeat episode of bradycardia in the afternoon which responded well to atropine and 0.1 mg epinephrine. A bronchoscopy was performed which did not show significant amounts of pus or mucus. BAL was performed and sent for culture. She remains on a vent, FiO2 now weaned to 40%. Urine output has been adequate (835 cc from L nephrostomy, 484 cc from urethral catheter), but Cr has increased from 1.1 to 1.4 after contrast load for CT. Patient remains off pressors. Hgb has been trending down over the course of 24h, from 8.7 to 7.0. She is having bowel function.    Review of Systems  Objective:     Temp:  [99.4 °F (37.4 °C)-100 °F (37.8 °C)] 99.7 °F (37.6 °C)  Pulse:  [] 83  Resp:  [5-36] 20  SpO2:  [85 %-100 %] 98 %  BP: ()/(50-86) 125/62  Arterial Line BP: ()/() 142/67     Body mass index is 29.14 kg/m².    Date 05/09/19 0700 - 05/10/19 0659   Shift 0841-1227 0260-2102 [id number] 24 Hour Total   INTAKE   Shift Total(mL/kg)       OUTPUT   Urine(mL/kg/hr) 80   80   Drains 4   4   Shift Total(mL/kg) 84(1.1)   84(1.1)   Weight (kg) 77 77 77 77          Drains     Drain                 Closed/Suction Drain 04/21/19 0300 LLQ 18 days         Nephrostomy 04/21/19 0629 Left 8 Fr. 18 days         Urethral Catheter 04/20/19 1711 Latex 16 Fr. 18 days         Gastrostomy/Enterostomy 05/02/19 1134 Percutaneous endoscopic gastrostomy (PEG) LUQ decompression;feeding 6 days                Physical Exam   Constitutional: She appears well-developed. No distress.   HENT:   Head: Normocephalic and atraumatic.   Neck: No JVD present.   Cardiovascular: Normal rate and regular rhythm.    Pulmonary/Chest:   On ventilator   Abdominal: Soft. She exhibits no distension. There is no rebound and no guarding.   Incision c/d/i   MADHURI drain with ss output  G-tube    Genitourinary:   Genitourinary Comments: Fontenot draining clear yellow  Left neph tube with clear yellow urine   Neurological: She is alert.   Skin: Skin is warm and dry. She is not diaphoretic. No pallor.         Significant Labs:    BMP:  Recent Labs   Lab 05/08/19  0513 05/08/19  0936 05/09/19  0426    139 139   K 4.3 4.1 5.1    104 105   CO2 29 25 27   BUN 29* 31* 31*   CREATININE 1.0 1.1 1.4   CALCIUM 8.9 8.5* 8.5*       CBC:   Recent Labs   Lab 05/08/19  1205  05/08/19  1844 05/09/19  0033 05/09/19  0428   WBC 15.01*  --   --  14.29* 12.54   HGB 8.1*  --  7.7* 7.2* 7.0*   HCT 26.6*   < > 24.7* 23.4* 22.7*   *  --   --  528* 492*    < > = values in this interval not displayed.       Significant Imaging:  CT: I have reviewed all results within the past 24 hours and my personal findings are:  significant bilateral pulmonary edema

## 2019-05-09 NOTE — PROGRESS NOTES
Tyrone Sagastume, RT at bedside.  Plan discussed.  TF will be restarted up to goal.  Heparin adjusted per updated APTT, Vanc. Trough pending.

## 2019-05-09 NOTE — PROGRESS NOTES
Therapy with vancomycin has been discontinued by the provider.  Pharmacy will sign off, please re-consult as needed.    Marilou Jones, PharmD, BCCCP  [phone]

## 2019-05-09 NOTE — PT/OT/SLP PROGRESS
Speech Language Pathology      Mariann Huff  MRN: [id number]    Patient not seen today secondary to remaining intubated, discussed patient with RN. ST will f/u for further PMSV trials when patient is placed on trach collar.     Tristan Robert CF-SLP  Speech-Language Pathology  Pager: [phone]

## 2019-05-09 NOTE — PROGRESS NOTES
[facility]  Urology  Progress Note    Patient Name: Mariann Huff  MRN: [id number]  Admission Date: 4/20/2019  Hospital Length of Stay: 19 days  Code Status: Full Code   Attending Provider: Robin Boyd MD   Primary Care Physician: Jasbir Haney MD    Subjective:     HPI:  Mariann Huff is a 59 y.o. female with history of HTN, type 2 diabetes mellitus, CAD, NSTEMI, and back pain who presents to [facility] [place] with altered mental status and sepsis. She underwent L5-S1 OLIF with NSGY on 4/12 and had intraoperative left ureteral injury with ureteroureteral anastomosis and ureteral stent placement. She did well initially and had correa and MADHURI drain removed on 4/16. She began having chills and altered mental status 2 days ago and this has progressively worsened. No complaints of pain.     She is altered and HPI is limited. In the ED she is febrile to 103 and tachycardic and pressures are low normal. WBC is 5, creatinine is 3.6 baseline 1.0, lactate is 4.6. Cath UA concerning for infection, 3+ LE, >100 WBCs, and many bacteria on microscopy.    CT and MRI abdomen both show air with minimal fluid near the surgical site with left ureter coursing through. There is air throughout the proximal collecting system which is decompressed with JJ ureteral stent in good position.    Taken for ex lap, ligation of left ureter and left neph tube placement on 4/21/19.    Interval History: After bradycardic event yesterday morning, patient obtained CT, which showed no appreciable PE but patient did have significant pulmonary edema. She had a repeat episode of bradycardia in the afternoon which responded well to atropine and 0.1 mg epinephrine. A bronchoscopy was performed which did not show significant amounts of pus or mucus. BAL was performed and sent for culture. She remains on a vent, FiO2 now weaned to 40%. Urine output has been adequate (835 cc from L nephrostomy, 484 cc from urethral catheter), but Cr has increased from 1.1  to 1.4 after contrast load for CT. Patient remains off pressors. Hgb has been trending down over the course of 24h, from 8.7 to 7.0. She is having bowel function.    Review of Systems  Objective:     Temp:  [99.4 °F (37.4 °C)-100 °F (37.8 °C)] 99.7 °F (37.6 °C)  Pulse:  [] 83  Resp:  [5-36] 20  SpO2:  [85 %-100 %] 98 %  BP: ()/(50-86) 125/62  Arterial Line BP: ()/() 142/67     Body mass index is 29.14 kg/m².    Date 05/09/19 0700 - 05/10/19 0659   Shift 3722-9836 8324-6212 1169-2115 24 Hour Total   INTAKE   Shift Total(mL/kg)       OUTPUT   Urine(mL/kg/hr) 80   80   Drains 4   4   Shift Total(mL/kg) 84(1.1)   84(1.1)   Weight (kg) 77 77 77 77          Drains     Drain                 Closed/Suction Drain 04/21/19 0300 LLQ 18 days         Nephrostomy 04/21/19 0629 Left 8 Fr. 18 days         Urethral Catheter 04/20/19 1711 Latex 16 Fr. 18 days         Gastrostomy/Enterostomy 05/02/19 1134 Percutaneous endoscopic gastrostomy (PEG) LUQ decompression;feeding 6 days                Physical Exam   Constitutional: She appears well-developed. No distress.   HENT:   Head: Normocephalic and atraumatic.   Neck: No JVD present.   Cardiovascular: Normal rate and regular rhythm.    Pulmonary/Chest:   On ventilator   Abdominal: Soft. She exhibits no distension. There is no rebound and no guarding.   Incision c/d/i   MADHURI drain with ss output  G-tube   Genitourinary:   Genitourinary Comments: Fontenot draining clear yellow  Left neph tube with clear yellow urine   Neurological: She is alert.   Skin: Skin is warm and dry. She is not diaphoretic. No pallor.         Significant Labs:    BMP:  Recent Labs   Lab 05/08/19  0513 05/08/19  0936 05/09/19  0426    139 139   K 4.3 4.1 5.1    104 105   CO2 29 25 27   BUN 29* 31* 31*   CREATININE 1.0 1.1 1.4   CALCIUM 8.9 8.5* 8.5*       CBC:   Recent Labs   Lab 05/08/19  1205  05/08/19  1844 05/09/19  0033 05/09/19  0428   WBC 15.01*  --   --  14.29* 12.54   HGB  8.1*  --  7.7* 7.2* 7.0*   HCT 26.6*   < > 24.7* 23.4* 22.7*   *  --   --  528* 492*    < > = values in this interval not displayed.       Significant Imaging:  CT: I have reviewed all results within the past 24 hours and my personal findings are:  significant bilateral pulmonary edema                  Assessment/Plan:     * Ureteral transection of left native kidney  Mariann Huff is a 59 y.o. female s/p left ureteral injury on 4/12, presented with fever, AMS, and intraabdominal abscess, taken for washout and ligation of left ureter with neph tube placement on 4/21, Trach/PEG 5/2.    - tube feeds per SICU  - Rocephin, Rifampin, Vancomycin - wean per SICU   - BAL on 5/8 showed no organisms  - Maintain correa, neph tube, and MADHURI drain  - Urine output adequate   - H/H downtrending; transfusions per SICU  - episode of hypoxemia and bradycardia requiring bag mask ventilation; CT showed significant pulmonary edema, no PE visible (however, dosed inadequately for PE protocol); patient remains on heparin gtt, wean per SICU    Dispo: continue ICU care      Sepsis  As above        VTE Risk Mitigation (From admission, onward)        Ordered     IP VTE LOW RISK PATIENT  Once      05/08/19 1315     heparin 25,000 units in dextrose 5% 250 ml (100 units/mL) infusion MINIMAL INTENSITY nomogram - [facility]  Continuous      05/08/19 0964     Place sequential compression device  Until discontinued      04/20/19 1865          Mook Ferguson MD  Urology  [facility]

## 2019-05-09 NOTE — PROGRESS NOTES
Subjective/Objective  Interval History/Significant Events: Episode of severe respiratory difficulty yesterday morning likely 2/2 to negative pressure pulmonary edema. Pt placed back on vent at 100% FiO2, sedation increased, emergent femoral arterial and venous access obtained, STAT bedside ECHO revealed findings consistent with hypertensive urgency and flash pulmonary edema, no evidence of right heart strain indicative of PE, CTA PE unhelpful due to poor study and critical condition of patient. Concern for aspiration ruled out with bedside bronchoscopy. Pt received high dose lasix which improved her pulmonary edema significantly. Placed on NAC for renal protection. Placed on very low dose pressor and fluids. Pt did very well overnight, no further episodes.     Follow-up For: Procedure(s) (LRB):  Creation, Nephrostomy, Percutaneous (Left)    Post-Operative Day: 16 Days Post-Op      Objective:     Vital Signs (Most Recent):  Temp: 99.7 °F (37.6 °C) (05/09/19 0700)  Pulse: 83 (05/09/19 0645)  Resp: 20 (05/09/19 0645)  BP: 125/62 (05/09/19 0506)  SpO2: 98 % (05/09/19 0645) Vital Signs (24h Range):  Temp:  [99.4 °F (37.4 °C)-100 °F (37.8 °C)] 99.7 °F (37.6 °C)  Pulse:  [] 83  Resp:  [5-36] 20  SpO2:  [85 %-100 %] 98 %  BP: ()/(50-86) 125/62  Arterial Line BP: ()/() 142/67     Weight: 77 kg (169 lb 12.1 oz)  Body mass index is 29.14 kg/m².      Intake/Output Summary (Last 24 hours) at 5/9/2019 0757  Last data filed at 5/9/2019 0700  Gross per 24 hour   Intake 1943 ml   Output 1336 ml   Net 607 ml       Physical Exam   Constitutional: She appears well-developed and well-nourished.   HENT:   Head: Normocephalic and atraumatic.   Tracheostomy in place   Eyes: Right eye exhibits no discharge. Left eye exhibits no discharge.   Neck: Normal range of motion. Neck supple.   Cardiovascular: Normal rate and regular rhythm.   Pulmonary/Chest: Effort normal. No respiratory distress.   Abdominal:   Midline  incision c/d/i.  MADHURI with scant serous drainage. PEG with no leak. Abdomen soft, non-distended.  Right femoral lines in place, dressing CDI.   Genitourinary:   Genitourinary Comments: Nephrostomy tube in place with watery dark yellow output.  Fontenot catheter+   Musculoskeletal: Normal range of motion.   Neurological:   Able to follow commands, denying pain.    Skin: Skin is warm and dry.   Vitals reviewed.      Lines/Drains/Airways     Central Venous Catheter Line                 Percutaneous Central Line Insertion/Assessment - triple lumen  05/08/19 0746 right femoral 1 day          Drain                 Closed/Suction Drain 04/21/19 0300 LLQ 18 days         Nephrostomy 04/21/19 0629 Left 8 Fr. 18 days         Urethral Catheter 04/20/19 1711 Latex 16 Fr. 18 days         Gastrostomy/Enterostomy 05/02/19 1134 Percutaneous endoscopic gastrostomy (PEG) LUQ decompression;feeding 6 days          Airway                 Surgical Airway 05/02/19 1107 Shiley Cuffed 6 days          Arterial Line                 Arterial Line 05/08/19 0743 Right Femoral 1 day          Peripheral Intravenous Line                 Midline Catheter Insertion/Assessment  - Single Lumen 05/07/19 1632 Left basilic vein (medial side of arm) 18g x 8cm 1 day                Significant Labs:    ABG:  Recent Labs     05/09/19  0424   PH 7.420   PCO2 42.3   PO2 121*   HCO3 27.5   POCSATURATED 99   BE 3       CBC/Anemia Profile:  Recent Labs   Lab 05/08/19  1205  05/08/19  1844 05/09/19  0033 05/09/19  0428   WBC 15.01*  --   --  14.29* 12.54   HGB 8.1*  --  7.7* 7.2* 7.0*   HCT 26.6*   < > 24.7* 23.4* 22.7*   *  --   --  528* 492*   MCV 88  --   --  88 87   RDW 13.7  --   --  13.9 14.0    < > = values in this interval not displayed.        Chemistries:  Recent Labs   Lab 05/08/19  0513 05/08/19  0936 05/09/19  0426    139 139   K 4.3 4.1 5.1    104 105   CO2 29 25 27   BUN 29* 31* 31*   CREATININE 1.0 1.1 1.4   CALCIUM 8.9 8.5* 8.5*    ALBUMIN 1.9* 1.8* 1.8*   PROT 6.4 6.2 6.1   BILITOT 0.9 0.8 1.0   ALKPHOS 96 97 85   ALT 20 20 17   AST 28 29 38   MG 2.2 1.8 2.5   PHOS 3.3 3.9 3.9       Significant Imaging:  I have reviewed all pertinent imaging results/findings within the past 24 hours.      Scheduled Meds:   chlorhexidine  15 mL Mouth/Throat BID    nystatin-triamcinolone   Topical (Top) BID    pantoprazole  40 mg Intravenous Daily    piperacillin-tazobactam (ZOSYN) IVPB  4.5 g Intravenous Q8H    rifAMpin (RIFADIN) IVPB  300 mg Intravenous Q12H    vancomycin (VANCOCIN) IVPB  1,000 mg Intravenous Q12H     Continuous Infusions:   epinephrine infusion Stopped (05/09/19 0300)    fentanyl Stopped (05/08/19 1500)    heparin (porcine) in D5W 17 Units/kg/hr (05/09/19 0751)    lactated ringers 50 mL/hr at 05/09/19 0500    propofol Stopped (05/08/19 1500)     PRN Meds:acetaminophen, albuterol-ipratropium, dextrose 50%, glucagon (human recombinant), hydrALAZINE, insulin aspart U-100, magnesium sulfate IVPB, magnesium sulfate IVPB, potassium chloride in water, promethazine (PHENERGAN) IVPB, sodium chloride 0.9%    Vital signs in last 24 hours:  Temp:  [99.4 °F (37.4 °C)-100 °F (37.8 °C)] 99.7 °F (37.6 °C)  Pulse:  [] 83  Resp:  [5-36] 20  SpO2:  [85 %-100 %] 98 %  BP: ()/(50-86) 125/62  Arterial Line BP: ()/() 142/67    Intake/Output last 3 shifts:  I/O last 3 completed shifts:  In: 2692.3 [I.V.:837.3; Blood:235; NG/GT:470; IV Piggyback:1150]  Out: 1801 [Urine:1769; Drains:32]  Intake/Output this shift:  No intake/output data recorded.    Problem Assessment/Plan  Cardiac/Vascular  CAD (coronary artery disease)  Assessment & Plan  ASSESSMENT/PLAN:   Mariann TRISTIN Huff is a 59 y.o. female s/p left ureteral injury on 4/12, who presented with fever, AMS, and intraabdominal abscess, taken for washout and ligation of left ureter with nephrostomy tube placement on 4/21. S/p Trach/PEG on 5/2. Episode of negative pressure pulmonary  edema on 5/8 requiring mechanical ventilation, diuresis and low dose pressor.      Neuro:   - Pain control with fentanyl prn   - Sedation with prop prn     Pulmonary:   - Been tolerating trach collar since 5/3 until episode of flash pulmonary edema on 5/8  - Pulmonary edema likely secondary to occlusion and negative pressure pulmonary edema  - SpO2 % on ventilator  - CXR to follow edema  - ABGs prn, normalized this am     Cardiac:   - HDS at this time.  Pt has been requiring low dose epi  - TTE 5/8 with no evidence of right heart strain or significant valvular pathology, normal LV systolic function, EF 70%     Renal:    - S/p transection of left ureter 4/12 and subsequent ligation and PCT nephrostomy tube placement with IR 4/21  - Renal function improving.    - Monitor I/Os  - NAC administered for renal protection on 5/8     Intake/Output Summary (Last 24 hours) at 5/9/2019 0807  Last data filed at 5/9/2019 0700  Gross per 24 hour   Intake 1943 ml   Output 1336 ml   Net 607 ml          ID:   - Blood cultures 4/12 +E. Coli; sensitivities pending. Repeat Bld Cx show NGTD.   - UCx from 4/20 growing E. Coli; sensitivities are now back. Repeat Cx pending.   - ID following for assistance with abx therapy, currently on Ceftriaxone, Rifampin and vancomycin.  - AF overnight  - WBC trending down     Hem/Onc:   - H/H trending down, likely dilutional  - Cont to monitor     Endocrine:  - DM Type 2 at baseline    - TF held, will likely restart today  - LDSSI  - Endocrine following for assistance with blood glucose control.      Fluids/Electrolytes/Nutrition/GI:   -  Start free water flushes 300 cc q6h  - Replace electrolytes PRN    # Shock Liver          - Resolved           - Labs continue to show improvement          - Elevated LFTs now normalized          - Thrombocytopenia; plts count improving; plts normalized          - INR normalized to 0.9     # Lactic Acidosis          - Now resolved    # Bloody BM  - No further  episodes  - Low threshold to consult CRS if needed     PPx:  - DVT: Lovenox, Hep gtt restarted 5/8        DISPO:  - Continue SICU care                Critical care was time spent personally by me on the following activities: development of treatment plan with patient or surrogate and bedside caregivers, discussions with consultants, evaluation of patient's response to treatment, examination of patient, ordering and performing treatments and interventions, ordering and review of laboratory studies, ordering and review of radiographic studies, pulse oximetry, re-evaluation of patient's condition.  This critical care time did not overlap with that of any other provider or involve time for any procedures.         Zoë Du MD CA-1  Critical Care - Surgery

## 2019-05-09 NOTE — CARE UPDATE
Patient's femoral line has been in for 24 hours. Will plan on DC the line in the am of 5/10 as infection protection.

## 2019-05-10 LAB
ABO + RH BLD: NORMAL
ALBUMIN SERPL BCP-MCNC: 1.8 G/DL (ref 3.5–5.2)
ALP SERPL-CCNC: 119 U/L (ref 55–135)
ALT SERPL W/O P-5'-P-CCNC: 16 U/L (ref 10–44)
ANION GAP SERPL CALC-SCNC: 12 MMOL/L (ref 8–16)
ANISOCYTOSIS BLD QL SMEAR: SLIGHT
APTT BLDCRRT: 49.7 SEC (ref 21–32)
APTT BLDCRRT: 51.7 SEC (ref 21–32)
AST SERPL-CCNC: 36 U/L (ref 10–40)
BACTERIA SPEC AEROBE CULT: NO GROWTH
BASO STIPL BLD QL SMEAR: ABNORMAL
BASOPHILS # BLD AUTO: 0.06 K/UL (ref 0–0.2)
BASOPHILS # BLD AUTO: 0.09 K/UL (ref 0–0.2)
BASOPHILS NFR BLD: 0.4 % (ref 0–1.9)
BASOPHILS NFR BLD: 0.5 % (ref 0–1.9)
BILIRUB SERPL-MCNC: 0.8 MG/DL (ref 0.1–1)
BLD GP AB SCN CELLS X3 SERPL QL: NORMAL
BUN SERPL-MCNC: 29 MG/DL (ref 6–20)
CALCIUM SERPL-MCNC: 8.5 MG/DL (ref 8.7–10.5)
CHLORIDE SERPL-SCNC: 105 MMOL/L (ref 95–110)
CO2 SERPL-SCNC: 23 MMOL/L (ref 23–29)
CREAT SERPL-MCNC: 1.3 MG/DL (ref 0.5–1.4)
DIFFERENTIAL METHOD: ABNORMAL
DIFFERENTIAL METHOD: ABNORMAL
EOSINOPHIL # BLD AUTO: 0.9 K/UL (ref 0–0.5)
EOSINOPHIL # BLD AUTO: 0.9 K/UL (ref 0–0.5)
EOSINOPHIL NFR BLD: 5.2 % (ref 0–8)
EOSINOPHIL NFR BLD: 6 % (ref 0–8)
ERYTHROCYTE [DISTWIDTH] IN BLOOD BY AUTOMATED COUNT: 14.2 % (ref 11.5–14.5)
ERYTHROCYTE [DISTWIDTH] IN BLOOD BY AUTOMATED COUNT: 14.2 % (ref 11.5–14.5)
EST. GFR  (AFRICAN AMERICAN): 52.3 ML/MIN/1.73 M^2
EST. GFR  (NON AFRICAN AMERICAN): 45.3 ML/MIN/1.73 M^2
GLUCOSE SERPL-MCNC: 184 MG/DL (ref 70–110)
GRAM STN SPEC: NORMAL
GRAM STN SPEC: NORMAL
HCT VFR BLD AUTO: 24.4 % (ref 37–48.5)
HCT VFR BLD AUTO: 24.5 % (ref 37–48.5)
HGB BLD-MCNC: 7.5 G/DL (ref 12–16)
HGB BLD-MCNC: 7.5 G/DL (ref 12–16)
IMM GRANULOCYTES # BLD AUTO: 0.2 K/UL (ref 0–0.04)
IMM GRANULOCYTES # BLD AUTO: 0.24 K/UL (ref 0–0.04)
IMM GRANULOCYTES NFR BLD AUTO: 1.4 % (ref 0–0.5)
IMM GRANULOCYTES NFR BLD AUTO: 1.4 % (ref 0–0.5)
INR PPP: 1 (ref 0.8–1.2)
INR PPP: 1 (ref 0.8–1.2)
LACTATE SERPL-SCNC: 0.7 MMOL/L (ref 0.5–2.2)
LYMPHOCYTES # BLD AUTO: 1.4 K/UL (ref 1–4.8)
LYMPHOCYTES # BLD AUTO: 1.7 K/UL (ref 1–4.8)
LYMPHOCYTES NFR BLD: 9.2 % (ref 18–48)
LYMPHOCYTES NFR BLD: 9.4 % (ref 18–48)
MAGNESIUM SERPL-MCNC: 2.1 MG/DL (ref 1.6–2.6)
MCH RBC QN AUTO: 27.1 PG (ref 27–31)
MCH RBC QN AUTO: 27.4 PG (ref 27–31)
MCHC RBC AUTO-ENTMCNC: 30.6 G/DL (ref 32–36)
MCHC RBC AUTO-ENTMCNC: 30.7 G/DL (ref 32–36)
MCV RBC AUTO: 88 FL (ref 82–98)
MCV RBC AUTO: 89 FL (ref 82–98)
MONOCYTES # BLD AUTO: 1.2 K/UL (ref 0.3–1)
MONOCYTES # BLD AUTO: 1.6 K/UL (ref 0.3–1)
MONOCYTES NFR BLD: 8.1 % (ref 4–15)
MONOCYTES NFR BLD: 8.7 % (ref 4–15)
NEUTROPHILS # BLD AUTO: 11.1 K/UL (ref 1.8–7.7)
NEUTROPHILS # BLD AUTO: 13.3 K/UL (ref 1.8–7.7)
NEUTROPHILS NFR BLD: 74.8 % (ref 38–73)
NEUTROPHILS NFR BLD: 74.9 % (ref 38–73)
NRBC BLD-RTO: 0 /100 WBC
NRBC BLD-RTO: 0 /100 WBC
PHOSPHATE SERPL-MCNC: 3.4 MG/DL (ref 2.7–4.5)
PLATELET # BLD AUTO: 464 K/UL (ref 150–350)
PLATELET # BLD AUTO: 488 K/UL (ref 150–350)
PLATELET BLD QL SMEAR: ABNORMAL
PMV BLD AUTO: 10 FL (ref 9.2–12.9)
PMV BLD AUTO: 10.4 FL (ref 9.2–12.9)
POCT GLUCOSE: 159 MG/DL (ref 70–110)
POCT GLUCOSE: 189 MG/DL (ref 70–110)
POCT GLUCOSE: 196 MG/DL (ref 70–110)
POCT GLUCOSE: 219 MG/DL (ref 70–110)
POCT GLUCOSE: 228 MG/DL (ref 70–110)
POLYCHROMASIA BLD QL SMEAR: ABNORMAL
POTASSIUM SERPL-SCNC: 4.5 MMOL/L (ref 3.5–5.1)
PROT SERPL-MCNC: 6.2 G/DL (ref 6–8.4)
PROTHROMBIN TIME: 10.8 SEC (ref 9–12.5)
PROTHROMBIN TIME: 10.8 SEC (ref 9–12.5)
RBC # BLD AUTO: 2.74 M/UL (ref 4–5.4)
RBC # BLD AUTO: 2.77 M/UL (ref 4–5.4)
SODIUM SERPL-SCNC: 140 MMOL/L (ref 136–145)
VANCOMYCIN SERPL-MCNC: 14.2 UG/ML
WBC # BLD AUTO: 14.75 K/UL (ref 3.9–12.7)
WBC # BLD AUTO: 17.74 K/UL (ref 3.9–12.7)

## 2019-05-10 PROCEDURE — 83735 ASSAY OF MAGNESIUM: CPT

## 2019-05-10 PROCEDURE — 99232 SBSQ HOSP IP/OBS MODERATE 35: CPT | Mod: ,,, | Performed by: NURSE PRACTITIONER

## 2019-05-10 PROCEDURE — 94003 VENT MGMT INPAT SUBQ DAY: CPT

## 2019-05-10 PROCEDURE — 97110 THERAPEUTIC EXERCISES: CPT

## 2019-05-10 PROCEDURE — 99499 UNLISTED E&M SERVICE: CPT | Mod: ,,, | Performed by: PHYSICIAN ASSISTANT

## 2019-05-10 PROCEDURE — 83605 ASSAY OF LACTIC ACID: CPT

## 2019-05-10 PROCEDURE — 84100 ASSAY OF PHOSPHORUS: CPT

## 2019-05-10 PROCEDURE — 99900026 HC AIRWAY MAINTENANCE (STAT)

## 2019-05-10 PROCEDURE — C9113 INJ PANTOPRAZOLE SODIUM, VIA: HCPCS | Performed by: STUDENT IN AN ORGANIZED HEALTH CARE EDUCATION/TRAINING PROGRAM

## 2019-05-10 PROCEDURE — 85025 COMPLETE CBC W/AUTO DIFF WBC: CPT | Mod: 91

## 2019-05-10 PROCEDURE — 99291 PR CRITICAL CARE, E/M 30-74 MINUTES: ICD-10-PCS | Mod: GC,,, | Performed by: SURGERY

## 2019-05-10 PROCEDURE — 63600175 PHARM REV CODE 636 W HCPCS: Performed by: NURSE PRACTITIONER

## 2019-05-10 PROCEDURE — 99291 CRITICAL CARE FIRST HOUR: CPT | Mod: GC,,, | Performed by: SURGERY

## 2019-05-10 PROCEDURE — 25000003 PHARM REV CODE 250: Performed by: STUDENT IN AN ORGANIZED HEALTH CARE EDUCATION/TRAINING PROGRAM

## 2019-05-10 PROCEDURE — 63600175 PHARM REV CODE 636 W HCPCS: Performed by: UROLOGY

## 2019-05-10 PROCEDURE — 63600175 PHARM REV CODE 636 W HCPCS: Performed by: STUDENT IN AN ORGANIZED HEALTH CARE EDUCATION/TRAINING PROGRAM

## 2019-05-10 PROCEDURE — 99499 NO LOS: ICD-10-PCS | Mod: ,,, | Performed by: PHYSICIAN ASSISTANT

## 2019-05-10 PROCEDURE — 94761 N-INVAS EAR/PLS OXIMETRY MLT: CPT

## 2019-05-10 PROCEDURE — 27000221 HC OXYGEN, UP TO 24 HOURS

## 2019-05-10 PROCEDURE — 85730 THROMBOPLASTIN TIME PARTIAL: CPT

## 2019-05-10 PROCEDURE — 80053 COMPREHEN METABOLIC PANEL: CPT

## 2019-05-10 PROCEDURE — 99233 PR SUBSEQUENT HOSPITAL CARE,LEVL III: ICD-10-PCS | Mod: ,,, | Performed by: PHYSICIAN ASSISTANT

## 2019-05-10 PROCEDURE — 86920 COMPATIBILITY TEST SPIN: CPT

## 2019-05-10 PROCEDURE — 99232 PR SUBSEQUENT HOSPITAL CARE,LEVL II: ICD-10-PCS | Mod: ,,, | Performed by: NURSE PRACTITIONER

## 2019-05-10 PROCEDURE — 99233 SBSQ HOSP IP/OBS HIGH 50: CPT | Mod: ,,, | Performed by: PHYSICIAN ASSISTANT

## 2019-05-10 PROCEDURE — 85610 PROTHROMBIN TIME: CPT

## 2019-05-10 PROCEDURE — 80202 ASSAY OF VANCOMYCIN: CPT

## 2019-05-10 PROCEDURE — 87040 BLOOD CULTURE FOR BACTERIA: CPT | Mod: 59

## 2019-05-10 PROCEDURE — 20000000 HC ICU ROOM

## 2019-05-10 PROCEDURE — 86901 BLOOD TYPING SEROLOGIC RH(D): CPT

## 2019-05-10 PROCEDURE — 25000003 PHARM REV CODE 250: Performed by: UROLOGY

## 2019-05-10 PROCEDURE — 85730 THROMBOPLASTIN TIME PARTIAL: CPT | Mod: 91

## 2019-05-10 RX ORDER — GLUCAGON 1 MG
1 KIT INJECTION
Status: DISCONTINUED | OUTPATIENT
Start: 2019-05-10 | End: 2019-05-24

## 2019-05-10 RX ORDER — INSULIN ASPART 100 [IU]/ML
3 INJECTION, SOLUTION INTRAVENOUS; SUBCUTANEOUS
Status: DISCONTINUED | OUTPATIENT
Start: 2019-05-10 | End: 2019-05-17

## 2019-05-10 RX ORDER — INSULIN ASPART 100 [IU]/ML
3 INJECTION, SOLUTION INTRAVENOUS; SUBCUTANEOUS
Status: DISCONTINUED | OUTPATIENT
Start: 2019-05-11 | End: 2019-05-17

## 2019-05-10 RX ORDER — VANCOMYCIN HCL IN 5 % DEXTROSE 1.25 G/25
15 PLASTIC BAG, INJECTION (ML) INTRAVENOUS
Status: DISCONTINUED | OUTPATIENT
Start: 2019-05-10 | End: 2019-05-10

## 2019-05-10 RX ORDER — INSULIN ASPART 100 [IU]/ML
0-5 INJECTION, SOLUTION INTRAVENOUS; SUBCUTANEOUS EVERY 4 HOURS PRN
Status: DISCONTINUED | OUTPATIENT
Start: 2019-05-10 | End: 2019-05-20

## 2019-05-10 RX ADMIN — INSULIN ASPART 2 UNITS: 100 INJECTION, SOLUTION INTRAVENOUS; SUBCUTANEOUS at 04:05

## 2019-05-10 RX ADMIN — INSULIN ASPART 3 UNITS: 100 INJECTION, SOLUTION INTRAVENOUS; SUBCUTANEOUS at 08:05

## 2019-05-10 RX ADMIN — RIFAMPIN 300 MG: 600 INJECTION, POWDER, LYOPHILIZED, FOR SOLUTION INTRAVENOUS at 02:05

## 2019-05-10 RX ADMIN — PANTOPRAZOLE SODIUM 40 MG: 40 INJECTION, POWDER, FOR SOLUTION INTRAVENOUS at 08:05

## 2019-05-10 RX ADMIN — PIPERACILLIN AND TAZOBACTAM 4.5 G: 4; .5 INJECTION, POWDER, LYOPHILIZED, FOR SOLUTION INTRAVENOUS; PARENTERAL at 11:05

## 2019-05-10 RX ADMIN — INSULIN ASPART 3 UNITS: 100 INJECTION, SOLUTION INTRAVENOUS; SUBCUTANEOUS at 04:05

## 2019-05-10 RX ADMIN — INSULIN ASPART 3 UNITS: 100 INJECTION, SOLUTION INTRAVENOUS; SUBCUTANEOUS at 11:05

## 2019-05-10 RX ADMIN — INSULIN ASPART 2 UNITS: 100 INJECTION, SOLUTION INTRAVENOUS; SUBCUTANEOUS at 05:05

## 2019-05-10 RX ADMIN — RIFAMPIN 300 MG: 600 INJECTION, POWDER, LYOPHILIZED, FOR SOLUTION INTRAVENOUS at 03:05

## 2019-05-10 RX ADMIN — VANCOMYCIN HYDROCHLORIDE 500 MG: 500 INJECTION, POWDER, LYOPHILIZED, FOR SOLUTION INTRAVENOUS at 12:05

## 2019-05-10 RX ADMIN — MICONAZOLE NITRATE: 20 OINTMENT TOPICAL at 08:05

## 2019-05-10 RX ADMIN — CHLORHEXIDINE GLUCONATE 0.12% ORAL RINSE 15 ML: 1.2 LIQUID ORAL at 08:05

## 2019-05-10 RX ADMIN — HEPARIN SODIUM AND DEXTROSE 21 UNITS/KG/HR: 10000; 5 INJECTION INTRAVENOUS at 05:05

## 2019-05-10 RX ADMIN — PIPERACILLIN AND TAZOBACTAM 4.5 G: 4; .5 INJECTION, POWDER, LYOPHILIZED, FOR SOLUTION INTRAVENOUS; PARENTERAL at 06:05

## 2019-05-10 RX ADMIN — ACETAMINOPHEN 650 MG: 325 TABLET ORAL at 08:05

## 2019-05-10 RX ADMIN — INSULIN ASPART 4 UNITS: 100 INJECTION, SOLUTION INTRAVENOUS; SUBCUTANEOUS at 12:05

## 2019-05-10 NOTE — PLAN OF CARE
Pt on PAV, 70%, & 5PEEP.  Pt tachy throughout night.  TMAX: 103.1, prn tylenol given & ice packs applied. Hydralazine & Labetalol given x1 for SBP >180 & HR >120. Heparin gtt infusing. TF @ goal of 40cc/hr. UOP adequate from correa & nephrostomy. Pt incontinent to stool, 3 BMS noted. Pt awake & alert, mouthing words appropriately. Plan of care reviewed w/pt & spouse; all questions answered.

## 2019-05-10 NOTE — PROGRESS NOTES
Dr. Hansen notified of pt's temp being 103.1.  PRN tylenol given.  Cool rags & Ice applied to pt.  Will reassess blood cultures after 0000 CBC results.  Will continue to closely monitor.

## 2019-05-10 NOTE — PT/OT/SLP PROGRESS
Occupational Therapy   Treatment    Name: Mariann Huff  MRN: [id number]  Admitting Diagnosis:  Ureteral transection of left native kidney  8 Days Post-Op    Recommendations:     Discharge Recommendations: rehabilitation facility  Discharge Equipment Recommendations:  (TBD closer to d/c. )  Barriers to discharge:  Other (Comment)(Pt requires increased assistance at current functional level )    Assessment:     Mariann Huff is a 59 y.o. female with a medical diagnosis of Ureteral transection of left native kidney.  She presents with performance deficits affecting function are weakness, impaired endurance, impaired functional mobilty, impaired self care skills, gait instability, impaired balance, decreased lower extremity function, impaired cardiopulmonary response to activity. Pt actively bleeding from rectum this date. Pt appropriate for bed level activity this date. If pt is appropriate over the weekend to drawsheet pt to medichair with assistance from nursing in order to improve OOB sitting sitting tolerance. Pt will continue to benefit from skilled OT in order to improve overall functional mobility and ADLs.     Rehab Prognosis:  Good; patient would benefit from acute skilled OT services to address these deficits and reach maximum level of function.       Plan:     Patient to be seen 4 x/week to address the above listed problems via self-care/home management, therapeutic exercises, therapeutic activities  · Plan of Care Expires: 06/05/19  · Plan of Care Reviewed with: patient    Subjective     Pain/Comfort:  · Pain Rating 1: 0/10  · Pain Rating Post-Intervention 1: 0/10    Objective:     Communicated with: RN prior to session.  Patient found HOB elevated with tracheostomy, telemetry, arterial line, blood pressure cuff, PICC line, oxygen, nephrostomy, MADHURI drain upon OT entry to room. Pt agreeable to therapy session.     General Precautions: Standard, fall   Orthopedic Precautions:N/A   Braces: (LSO when not in  supine )     Occupational Performance:     Bed Mobility:    · Patient completed Scooting/Bridging with maximal assistance and 2 persons for supine scooting towards HOB     Functional Mobility/Transfers:  · Not performed this date as pt was actively bleeding from rectum during therapy session.       [facility] 6 Click ADL: 13    Treatment & Education:  - Pt completed 1 sets of 20 reps of B UE AROM exercise program sitting supported in bed in order to work towards increasing UB strength/endurance and to maximize safety and (I) with mobility and self-care skills.  Pt completed the following exercises with initial demonstration from therapist: shld flexion/extension, elbow flexion/extension, chest press, and horizontal abduction.   - Pt completed 1 sets of 20 reps of B LE AROM exercise program sitting supported in bed in order to work towards increasing LB strength/endurance and to maximize safety and (I) with mobility and self-care skills in standing.  Pt completed the following exercises with initial demonstration from therapist: hip flexion, SAQ, hip abduction, and ankle pumps.   - LSO braced on when not in supine.   Handout provided for HEP and family educated on how to assist pt with there ex.     Patient left HOB elevated with all lines intact, call button in reach and RN and family  presentEducation:      GOALS:   Multidisciplinary Problems     Occupational Therapy Goals        Problem: Occupational Therapy Goal    Goal Priority Disciplines Outcome Interventions   Occupational Therapy Goal     OT, PT/OT Ongoing (interventions implemented as appropriate)    Description:  Goals to be met by: 5/20/19     Patient will increase functional independence with ADLs by performing:    UE Dressing with Set-up Assistance.  LE Dressing with Maximum Assistance and Assistive Devices as needed.  Grooming while standing with Minimal Assistance.  Toileting from bedside commode with Minimal Assistance for hygiene and clothing management.    Sitting at edge of bed x10 minutes with Supervision.  Rolling to Bilateral with Minimal Assistance.   Supine to sit with Minimal Assistance.  Toilet transfer to bedside commode with Minimal Assistance.                      Time Tracking:     OT Date of Treatment: 05/10/19  OT Start Time: 1405  OT Stop Time: 1425  OT Total Time (min): 20 min    Billable Minutes:Therapeutic Exercise 20    Shania M Kirilli, OT  5/10/2019

## 2019-05-10 NOTE — PROGRESS NOTES
Pharmacokinetic Initial Assessment: IV Vancomycin     Assessment/Plan:     - Vancomycin re-initiated by team for worsening leukocytosis, fevers, and possible pneumonia.  - Patient previously receiving vancomycin 1000 mg q12 hours.  Last dose 5/8 at 23:10.  - Patient with recent increase in SCr to peak of 1.4 mg/dl on 5/9.  SCr today 1.3 mg/dl.   - Random level on 5/10 at 10:15 was 14.2 mg/dl.  Patient has residual vancomycin on board from previous regimen.   - Dose vancomycin 500 mg x 1    - Goal trough concentration is 15 to 20 mg/dL for possible pneumonia.  - Random vancomycin level with morning labs ordered on 5/11.  Pharmacy to re-dose if level < 20 mg/dl.     Pharmacy will continue to follow and monitor vancomycin.       Please contact at extension [phone] with any questions regarding this assessment.      Thank you for the consult,   Marilou Jones, PharmD, BCCCP       Patient brief summary:  Mariann Huff is a 59 y.o. female initiated on antimicrobial therapy with IV Vancomycin for treatment of suspected lower respiratory infection.    Drug Allergies:   Review of patient's allergies indicates:  No Known Allergies    Actual Body Weight:   77.8 kg    Renal Function:   Estimated Creatinine Clearance: 48.6 mL/min (based on SCr of 1.3 mg/dL).,     Dialysis Method (if applicable):  Not applicable    CBC (last 72 hours):  Recent Labs   Lab Result Units 05/07/19  2311 05/08/19  0514 05/08/19  0936 05/08/19  1205 05/08/19  1844 05/09/19  0033 05/09/19  0428 05/09/19  1201 05/09/19  2321   WBC K/uL 11.62 12.89* 17.09* 15.01*  --  14.29* 12.54 13.28* 14.75*   Hemoglobin g/dL 6.7* 8.7* 9.0* 8.1* 7.7* 7.2* 7.0* 7.4* 7.5*   Hematocrit % 22.0* 28.3* 28.7* 26.6* 24.7* 23.4* 22.7* 24.2* 24.4*   Platelets K/uL 565* 552* 606* 526*  --  528* 492* 493* 464*   Gran% % 67.4 72.5 82.2* 78.7*  --  75.3* 75.2* 73.0 74.9*   Lymph% % 15.9* 12.4* 6.6* 8.0*  --  9.6* 10.0* 10.0* 9.2*   Mono% % 9.4 8.5 6.5 7.9  --  8.5 7.8 9.0 8.1    Eosinophil% % 5.5 4.7 3.0 3.8  --  4.8 5.1 6.3 6.0   Basophil% % 0.6 0.6 0.5 0.5  --  0.5 0.6 0.6 0.4   Differential Method  Automated Automated Automated Automated  --  Automated Automated Automated Automated       Metabolic Panel (last 72 hours):  Recent Labs   Lab Result Units 05/08/19  0513 05/08/19  0936 05/09/19  0426 05/10/19  0330   Sodium mmol/L 140 139 139 140   Potassium mmol/L 4.3 4.1 5.1 4.5   Chloride mmol/L 104 104 105 105   CO2 mmol/L 29 25 27 23   Glucose mg/dL 140* 184* 183* 184*   BUN, Bld mg/dL 29* 31* 31* 29*   Creatinine mg/dL 1.0 1.1 1.4 1.3   Albumin g/dL 1.9* 1.8* 1.8* 1.8*   Total Bilirubin mg/dL 0.9 0.8 1.0 0.8   Alkaline Phosphatase U/L 96 97 85 119   AST U/L 28 29 38 36   ALT U/L 20 20 17 16   Magnesium mg/dL 2.2 1.8 2.5 2.1   Phosphorus mg/dL 3.3 3.9 3.9 3.4       Drug levels (last 3 results):  Recent Labs   Lab Result Units 05/10/19  1015   Vancomycin, Random ug/mL 14.2       Microbiologic Results:  Microbiology Results (last 7 days)     Procedure Component Value Units Date/Time    Blood culture [[id number]] Collected:  05/10/19 1015    Order Status:  Sent Specimen:  Blood from Peripheral, Hand, Right Updated:  05/10/19 1030    Blood culture [[id number]] Collected:  05/10/19 1020    Order Status:  Sent Specimen:  Blood from Peripheral, Upper Arm, Right Updated:  05/10/19 1029    Culture, Respiratory with Gram Stain [[id number]] Collected:  05/08/19 1300    Order Status:  Completed Specimen:  Respiratory from BAL, RLL Updated:  05/10/19 0927     Respiratory Culture No growth     Gram Stain (Respiratory) Rare WBC's     Gram Stain (Respiratory) No organisms seen    Culture, VAP (BAL) [[id number]] Collected:  05/08/19 1300    Order Status:  Canceled Specimen:  Bronchial Alveolar Lavage from BAL, RLL Updated:  05/08/19 1425    Aerobic culture [[id number]]  (Susceptibility) Collected:  04/21/19 0125    Order Status:  Completed Specimen:  Body Fluid from Abdomen Updated:  05/06/19 9182      Aerobic Bacterial Culture --     STAPHYLOCOCCUS LUGDUNENSIS  Rare      Narrative:       Retroperitoneal fluid    Clostridium difficile EIA [[id number]] Collected:  05/05/19 1132    Order Status:  Completed Specimen:  Stool Updated:  05/05/19 1535     C. diff Antigen Negative     C difficile Toxins A+B, EIA Negative     Comment: Testing not recommended for children <24 months old.       Narrative:       [id number]    Blood culture [[id number]] Collected:  04/30/19 0230    Order Status:  Completed Specimen:  Blood from Peripheral, Hand, Right Updated:  05/05/19 0612     Blood Culture, Routine No growth after 5 days.    Blood culture [[id number]] Collected:  04/30/19 0247    Order Status:  Completed Specimen:  Blood from Peripheral, Wrist, Right Updated:  05/05/19 0612     Blood Culture, Routine No growth after 5 days.

## 2019-05-10 NOTE — PLAN OF CARE
Problem: Adult Inpatient Plan of Care  Goal: Plan of Care Review  Pt. Stable throughout day  Neuro intact   MAP remained > 65 and < 180 systolic  PAV trial.  Currently 70% 5 PEEP  TF at goal 40 mL/hr  Fontenot output minimal, Nephrostomy output approx. 300 q4 hr.--team aware

## 2019-05-10 NOTE — SUBJECTIVE & OBJECTIVE
Interval History: Patient febrile overnight to 103.1, still febrile at 100.9. WBC 14 (from 10.) HDS without pressors. On vent at 70% FiO2. UOP  Adequate with Lasix (1225 cc from L nephrostomy, 620 cc from urethral Fontenot.) Cr stable at 1.3 from 1.4 yesterday.     Review of Systems  Objective:     Temp:  [99.7 °F (37.6 °C)-103.1 °F (39.5 °C)] 100.9 °F (38.3 °C)  Pulse:  [] 109  Resp:  [8-39] 25  SpO2:  [99 %-100 %] 100 %  BP: (185)/(82) 185/82  Arterial Line BP: (108-181)/(49-96) 145/63     Body mass index is 28.54 kg/m².           Drains     Drain                 Closed/Suction Drain 04/21/19 0300 LLQ 19 days         Nephrostomy 04/21/19 0629 Left 8 Fr. 19 days         Urethral Catheter 04/20/19 1711 Latex 16 Fr. 19 days         Gastrostomy/Enterostomy 05/02/19 1134 Percutaneous endoscopic gastrostomy (PEG) LUQ decompression;feeding 7 days                Physical Exam    Significant Labs:    BMP:  Recent Labs   Lab 05/08/19  0936 05/09/19  0426 05/10/19  0330    139 140   K 4.1 5.1 4.5    105 105   CO2 25 27 23   BUN 31* 31* 29*   CREATININE 1.1 1.4 1.3   CALCIUM 8.5* 8.5* 8.5*       CBC:   Recent Labs   Lab 05/09/19  0428 05/09/19  1201 05/09/19  2321   WBC 12.54 13.28* 14.75*   HGB 7.0* 7.4* 7.5*   HCT 22.7* 24.2* 24.4*   * 493* 464*       Significant Imaging:  All pertinent imaging results/findings from the past 24 hours have been reviewed.

## 2019-05-10 NOTE — SUBJECTIVE & OBJECTIVE
Past Medical History:   Diagnosis Date    Anticoagulant long-term use     Asthma     Back pain     Bradycardia, unspecified 2019    The etiology of the bradycardic episode is unclear.  The have appear to be respiratory in origin (at least the 1st episode).  We will continue to monitor carefully.  We are awaiting evaluation by Cardiology.      CAD (coronary artery disease)     s/p stentimg  (2), (1)    Carotid artery stenosis     Diabetes mellitus type 2 in obese     HTN (hypertension), benign     Hyperlipidemia     Keloid cicatrix     NPDR (nonproliferative diabetic retinopathy) 2015    NSTEMI (non-ST elevated myocardial infarction)     Nuclear sclerosis - Right Eye 3/18/2014    Primary localized osteoarthrosis, lower leg 2014    Sleep apnea        Past Surgical History:   Procedure Laterality Date    BRONCHOSCOPY N/A 2019    Performed by Sean Ruano MD at [facility] OR 2ND FLR    CARDIAC CATHETERIZATION      cataract extraction left eye      cataracts      CATHETERIZATION, HEART, LEFT Left 2014    Performed by Wilman Kim MD at [facility] CATH LAB     SECTION, LOW TRANSVERSE      CORONARY ANGIOPLASTY      Creation, Nephrostomy, Percutaneous Left 2019    Performed by Karina Surgeon at [facility] KARINA    CREATION, TRACHEOSTOMY N/A 2019    Performed by Sean Ruano MD at [facility] OR 2ND FLR    EGD, WITH PEG TUBE INSERTION  2019    Performed by Sean Ruano MD at [facility] OR 2ND FLR    ESOPHAGOGASTRODUODENOSCOPY (EGD) N/A 2016    Performed by Gardenia Adamson MD at [facility] ENDO (4TH FLR)    EXCISION TURBINATE, SUBMUCOUS      FUSION, SPINE, LUMBAR, ANTERIOR APPROACH Left L5-S1 Anterior to Psoa Interbody Fusion, L5-S1 Posterior Instrumentation Left 2019    Performed by Mk George MD at [facility] OR 2ND FLR    HAND SURGERY Left     HAND SURGERY Right     torn ligament repair/ Dr. Yeboah    HYSTERECTOMY       Injection,steroid,epidural,transforaminal approach - Bilateral - S1 Bilateral 9/25/2018    Performed by Tl Abreu MD at [facility] PAIN MGT    Injection-steroid-epidural-caudal N/A 2/7/2019    Performed by Dave Bentley Jr., MD at [facility] PAIN MGT    INSERTION-INTRAOCULAR LENS (IOL) Right 9/1/2015    [facility]    LAPAROTOMY, EXPLORATORY; LIGATION OF LEFT URETER; DOUBLE J STENT REMOVAL LEFT URETER Left 4/20/2019    Performed by Paul Carlson MD at [facility] OR 2ND FLR    left foot surgery      left wrist surgery      NASAL SEPTUM SURGERY  5/7/15    PHACOEMULSIFICATION-ASPIRATION-CATARACT Right 9/1/2015    Performed by Good [facility]ingo MD at [facility] OR [facility] FLR    REPAIR, URETER  4/12/2019    Performed by Mk George MD at [facility] OR [address] [place] [address]    RESECTION-TURBINATES (SMR) N/A 5/7/2015    Performed by Dileep Dubois III, MD at [facility] OR [address] [place] [address]    rt elbow surgery      S/P LAD COATED STENT  05/14/2010    6 total     S/P OM1 STENT  08/2003    SEPTOPLASTY N/A 5/7/2015    Performed by Dileep Dubois III, MD at [facility] OR [address] [place] [address]    SINUS SURGERY      F.E.S.S.    SINUS SURGERY FUNCTIONAL ENDOSCOPIC WITH NAVIGATION WITH MAXILLARIES, ETHMOIDS, LEFT SPHENOID, LEFT LOLY N/A 5/7/2015    Performed by Dileep Dubois III, MD at [facility] OR [address] [place] [address]    STENT, URETERAL placement  4/12/2019    Performed by Robin Boyd MD at [facility] OR [address] [place] [address]    TUBAL LIGATION         Review of patient's allergies indicates:  No Known Allergies    Medications:  Medications Prior to Admission   Medication Sig    albuterol (PROVENTIL/VENTOLIN HFA) 90 mcg/actuation inhaler Inhale 2 puffs into the lungs every 6 (six) hours as needed for Wheezing.    amLODIPine (NORVASC) 10 MG tablet TAKE 1 TABLET (10 MG TOTAL) BY MOUTH ONCE DAILY.    aspirin 81 mg Tab Take 81 mg by mouth. 1 Tablet Oral Every day    atorvastatin (LIPITOR) 40 MG tablet TAKE 1 TABLET (40 MG TOTAL) BY MOUTH ONCE DAILY.  "   ACCU-CHEK EDIN PLUS METER [facility]     BD INSULIN PEN NEEDLE UF MINI 31 x 3/16 " Ndle USE 4 TIMES A DAY    blood sugar diagnostic (ACCU-CHEK EDIN PLUS TEST STRP) Strp TEST BLOOD SUGARS 4 TIMES DAILY    chlorthalidone (HYGROTEN) 50 MG Tab Take 1 tablet (50 mg total) by mouth once daily.    esomeprazole (NEXIUM) 40 MG capsule TAKE 1 CAPSULE (40 MG TOTAL) BY MOUTH BEFORE BREAKFAST.    fenofibrate micronized (LOFIBRA) 134 MG Cap TAKE 1 CAPSULE (134 MG TOTAL) BY MOUTH BEFORE BREAKFAST.    flash glucose scanning reader (FREESTYLE MISTI 14 DAY READER) Misc 1 each by Misc.(Non-Drug; Combo Route) route once daily.    flash glucose sensor (FREESTYLE MISTI 14 DAY SENSOR) Kit 1 each by Misc.(Non-Drug; Combo Route) route once daily.    gabapentin (NEURONTIN) 100 MG capsule Take 2 capsules (200 mg total) by mouth every evening.    insulin aspart U-100 (NOVOLOG FLEXPEN U-100 INSULIN) 100 unit/mL InPn pen INJECT 35 U AM, 45 U AT NOON, 35 U PM.150-200 +2, 201-250 +4, 251-300 +6, 301-350 +8, >350 +10    insulin glargine, TOUJEO, (TOUJEO SOLOSTAR U-300 INSULIN) 300 unit/mL (1.5 mL) InPn pen Inject 50 Units into the skin 2 (two) times daily.    INVOKANA 300 mg Tab tablet Take 1 tablet (300 mg total) by mouth once daily.    irbesartan (AVAPRO) 300 MG tablet Take 1 tablet (300 mg total) by mouth every evening.    lancets 30 gauge Misc     metoprolol succinate (TOPROL-XL) 100 MG 24 hr tablet TAKE 1 TABLET (100 MG TOTAL) BY MOUTH ONCE DAILY.    nitroGLYCERIN (NITROSTAT) 0.4 MG SL tablet Take one every 2-3 min till chestpain relief for 3 times and if still no relief, call MD or come to ED    omega-3 acid ethyl esters (LOVAZA) 1 gram capsule Take 1 g by mouth once daily.     pen needle, diabetic (BD ULTRA-FINE GRABIEL PEN NEEDLES) 32 gauge x 5/32" Ndle For use with insulin pens daily 4 times a day    prasugrel (EFFIENT) 10 mg Tab TAKE 1 TABLET (10 MG TOTAL) BY MOUTH ONCE DAILY.    tamsulosin (FLOMAX) 0.4 mg Cap Take 1 " capsule (0.4 mg total) by mouth every evening.    tramadol (ULTRAM) 50 mg tablet Take 1 tablet (50 mg total) by mouth 3 (three) times daily as needed for Pain.    TRULICITY 1.5 mg/0.5 mL PnIj INJECT 1.5 MG INTO THE SKIN EVERY 7 DAYS.    zolpidem (AMBIEN) 5 MG Tab Take 1 tablet (5 mg total) by mouth nightly as needed.    [DISCONTINUED] cyclobenzaprine (FLEXERIL) 10 MG tablet TAKE 1 TABLET BY MOUTH 3 TIMES A DAY AS NEEDED FOR MUSCLE SPASMS. NO WORKING OR DRIVING ON THIS MED     Antibiotics (From admission, onward)    Start     Stop Route Frequency Ordered    05/10/19 0852  piperacillin-tazobactam 4.5 g in sodium chloride 0.9% 100 mL IVPB (ready to mix system)      -- IV Every 8 hours (non-standard times) 05/10/19 0850    05/07/19 1500  rifAMpin (RIFADIN) 300 mg in sodium chloride 0.9% 100 mL IVPB      -- IV Every 12 hours (non-standard times) 05/07/19 1249        Antifungals (From admission, onward)    Start     Stop Route Frequency Ordered    05/09/19 0930  miconazole nitrate 2% ointment      -- Top 2 times daily 05/09/19 0822        Antivirals (From admission, onward)    None           Immunization History   Administered Date(s) Administered    Influenza 11/19/2009    Influenza - Quadrivalent 10/10/2014, 10/09/2015, 10/24/2016    Influenza - Quadrivalent - PF 11/27/2017    Influenza A (H1N1) 2009 Monovalent - IM 11/19/2009    Pneumococcal Polysaccharide - 23 Valent 06/19/2014    Td - PF (ADULT) 04/17/2017       Family History     Problem Relation (Age of Onset)    Diabetes Mother, Father, Sister, Brother, Sister    Heart attack Father    Heart disease Mother    Leukemia Father    No Known Problems Sister, Brother, Brother, Maternal Grandmother, Maternal Grandfather, Paternal Grandmother, Paternal Grandfather, Son, Son, Maternal Aunt, Maternal Uncle, Paternal Aunt, Paternal Uncle        Social History     Socioeconomic History    Marital status:      Spouse name: Shamir    Number of children: 2     Years of education: Not on file    Highest education level: Not on file   Occupational History    Occupation: cafeteria     Employer: [employer]     Employer: [employer]     Employer: [employer]   Social Needs    Financial resource strain: Not on file    Food insecurity:     Worry: Not on file     Inability: Not on file    Transportation needs:     Medical: Not on file     Non-medical: Not on file   Tobacco Use    Smoking status: Never Smoker    Smokeless tobacco: Never Used   Substance and Sexual Activity    Alcohol use: No     Alcohol/week: 0.0 oz    Drug use: No    Sexual activity: Yes     Partners: Male     Birth control/protection: Post-menopausal     Comment:    Lifestyle    Physical activity:     Days per week: Not on file     Minutes per session: Not on file    Stress: Not on file   Relationships    Social connections:     Talks on phone: Not on file     Gets together: Not on file     Attends Evangelical service: Not on file     Active member of club or organization: Not on file     Attends meetings of clubs or organizations: Not on file     Relationship status: Not on file   Other Topics Concern    Are you pregnant or think you may be? No    Breast-feeding No   Social History Narrative    . 2 children.      Review of Systems   Unable to perform ROS: Other (Trache - see HPI)     Objective:     Vital Signs (Most Recent):  Temp: 99.7 °F (37.6 °C) (05/10/19 1500)  Pulse: 109 (05/10/19 1600)  Resp: (!) 32 (05/10/19 1600)  BP: (!) 185/82 (05/09/19 2100)  SpO2: 100 % (05/10/19 1600) Vital Signs (24h Range):  Temp:  [99.7 °F (37.6 °C)-103.1 °F (39.5 °C)] 99.7 °F (37.6 °C)  Pulse:  [] 109  Resp:  [8-39] 32  SpO2:  [99 %-100 %] 100 %  BP: (185)/(82) 185/82  Arterial Line BP: (108-181)/(49-96) 159/61     Weight: 77.8 kg (171 lb 8.3 oz)  Body mass index is 28.54 kg/m².    Estimated Creatinine Clearance: 48.6 mL/min (based on SCr of 1.3  mg/dL).    Physical Exam   Constitutional: She is oriented to person, place, and time. She appears well-developed and well-nourished. No distress.       HENT:   Head: Normocephalic and atraumatic.   Cardiovascular: Normal rate, regular rhythm and normal heart sounds. Exam reveals no gallop and no friction rub.   No murmur heard.  Pulmonary/Chest: Effort normal and breath sounds normal. No stridor. No respiratory distress. She has no wheezes. She has no rales.   Abdominal: Soft. Bowel sounds are normal. She exhibits no distension and no mass. There is no tenderness. There is no rebound and no guarding.   Musculoskeletal: She exhibits no edema.   Neurological: She is alert and oriented to person, place, and time.   Skin: Skin is warm and dry. She is not diaphoretic.   Psychiatric: She has a normal mood and affect. Her behavior is normal.       Significant Labs:   Blood Culture:   Recent Labs   Lab 04/23/19  0430 04/23/19  0500 04/24/19  0950 04/30/19  0230 04/30/19  0247   LABBLOO No growth after 5 days. No growth after 5 days. No growth after 5 days. No growth after 5 days. No growth after 5 days.     CBC:   Recent Labs   Lab 05/09/19  1201 05/09/19  2321 05/10/19  1125   WBC 13.28* 14.75* 17.74*   HGB 7.4* 7.5* 7.5*   HCT 24.2* 24.4* 24.5*   * 464* 488*     CMP:   Recent Labs   Lab 05/09/19  0426 05/10/19  0330    140   K 5.1 4.5    105   CO2 27 23   * 184*   BUN 31* 29*   CREATININE 1.4 1.3   CALCIUM 8.5* 8.5*   PROT 6.1 6.2   ALBUMIN 1.8* 1.8*   BILITOT 1.0 0.8   ALKPHOS 85 119   AST 38 36   ALT 17 16   ANIONGAP 7* 12   EGFRNONAA 41.4* 45.3*     Respiratory Culture:   Recent Labs   Lab 04/30/19  0900 05/08/19  1300   GSRESP <10 epithelial cells per low power field.  No WBC's  No organisms seen Rare WBC's  No organisms seen   RESPIRATORYC  --  No growth     Urine Culture:   Recent Labs   Lab 04/20/19  1711 04/21/19  0640 04/21/19  2256 04/25/19  0252   LABURIN ESCHERICHIA COLI  >100,000  cfu/ml   No growth No significant growth No growth     Urine Studies:   Recent Labs   Lab 04/25/19  0252   COLORU Argelia   APPEARANCEUA Cloudy*   PHUR 5.0   SPECGRAV 1.020   PROTEINUA 1+*   GLUCUA 3+*   KETONESU 1+*   BILIRUBINUA Negative   OCCULTUA 3+*   NITRITE Negative   LEUKOCYTESUR 3+*   RBCUA 25*   WBCUA 55*   BACTERIA Few*   SQUAMEPITHEL 1   HYALINECASTS 0     Wound Culture:   Recent Labs   Lab 04/21/19  0125   LABAERO STAPHYLOCOCCUS LUGDUNENSIS  Rare       All pertinent labs within the past 24 hours have been reviewed.    Significant Imaging: I have reviewed all pertinent imaging results/findings within the past 24 hours.   X-Ray Chest 1 View [[id number]] Resulted: 05/09/19 0801   Order Status: Completed Updated: 05/09/19 0803   Narrative:     EXAMINATION:  XR CHEST 1 VIEW    CLINICAL HISTORY:  pulmonary edema;    FINDINGS:  Trach tube T3.  There is cardiomegaly moderate-severe edema and no change.   Impression:       See above    Pulmonary edema pneumonia aspiration or sepsis.      Electronically signed by: Mart Norris MD  Date: 05/09/2019  Time: 08:01   X-Ray Chest 1 View [[id number]] Resulted: 05/08/19 1249   Order Status: Completed Updated: 05/08/19 1252   Narrative:     EXAMINATION:  XR CHEST 1 VIEW    CLINICAL HISTORY:  pre-op;    TECHNIQUE:  Single frontal view of the chest was performed.    COMPARISON:  05/08/2019 at 06:00    FINDINGS:  Tracheostomy tube with tip above the barb.  No evidence of pneumothorax.  Cardiomediastinal silhouette is stable.  There is aortic atherosclerosis with coronary stents.  Interval improvement in bilateral airspace opacities.  No evidence of large pleural effusion.  Bones demonstrate no acute abnormality.  There is a G-tube in a surgical drain projected over the left upper quadrant.   Impression:       Improving bilateral airspace opacities.  Given the time frame this may represent improving pulmonary edema.      Electronically signed by: Les Hatfield MD  Date:  05/08/2019  Time: 12:49   US Lower Extremity Veins Bilateral [[id number]] Resulted: 05/08/19 1139   Order Status: Completed Updated: 05/08/19 1141   Narrative:     EXAMINATION:  US LOWER EXTREMITY VEINS BILATERAL    CLINICAL HISTORY:  concern for PE    TECHNIQUE:  Duplex and color flow Doppler with dynamic compression was performed of the bilateral lower extremity veins.    COMPARISON:  No relevant prior.    FINDINGS:  Right thigh veins: Overlying bandage slightly obscures the right common femoral vein however it appears to compress and the free of thrombus.  The femoral, popliteal, upper greater saphenous, and deep femoral veins are patent and free of thrombus. The veins are normally compressible and have normal phasic flow and augmentation response.    Right calf veins: The visualized calf veins are patent.    Left thigh veins: The common femoral, femoral, popliteal, upper greater saphenous, and deep femoral veins are patent and free of thrombus. The veins are normally compressible and have normal phasic flow and augmentation response.    Left calf veins: The visualized calf veins are patent.    Miscellaneous: None.   Impression:       No evidence of deep venous thrombosis in either lower extremity.    Electronically signed by resident: Aime Alexandre  Date: 05/08/2019  Time: 11:05    Electronically signed by: Paul Dunne MD  Date: 05/08/2019  Time: 11:39   CTA Chest Non Coronary [[id number]] (Abnormal) Resulted: 05/08/19 1037   Order Status: Completed Updated: 05/08/19 1040   Narrative:     EXAMINATION:  CTA CHEST NON CORONARY    CLINICAL HISTORY:  Chest pain, acute, PE suspected, high pretest prob    TECHNIQUE:  Axial image were obtained from the thoracic inlet to the lung bases following the IV administration of 75 mL of Omnipaque 350.  Coronal, sagittal and MIPS reformats were obtained as well.    COMPARISON:  Multiple chest radiographs including the most recent chest radiograph dating  05/08/2019.    FINDINGS:  Tubes and lines: Tracheostomy tube in place terminating above the barb.    Base of Neck: Hypodensity in the left thyroid lobe measuring 1.2 cm.  Further evaluation can be obtained with dedicated nonemergent thyroid ultrasound as clinically indicated.    Thoracic soft tissues: Unremarkable.    Aorta: Left-sided aortic arch.  No aneurysm.  Calcific atherosclerosis of the aorta and coronary arteries.    Heart: Normal size. No effusion.    Pulmonary vasculature: Pulmonary arteries distribute normally.  This is nondiagnostic exam for pulmonary thromboembolism as the bolus timing was missed for the pulmonary arteries.  Examination was not repeated secondary to patient's clinical condition.    Faye/Mediastinum: No pathologic anitha enlargement.    Airways: Patent.    Lungs/Pleura: Perihilar predominant pulmonary opacities.  Small bilateral pleural fluid.  No pneumothorax.    Esophagus: Unremarkable.    Upper Abdomen: Few hypodense lesions in the left kidney, likely representing cysts.  There is a gastrostomy tube.    Bones: No acute fracture. No suspicious lytic or sclerotic lesions.  DJD of the spine.   Impression:       Nondiagnostic exam for pulmonary thromboembolism as the bolus timing was missed for the pulmonary arteries. Examination was not repeated secondary to patient's clinical condition.    Perihilar predominant pulmonary opacities along with bilateral pleural effusion, findings are concerning for pulmonary edema.  Aspiration is thought to be less likely.    Calcific atherosclerosis of the aorta and coronary arteries.    Hypodensity in the left thyroid lobe measuring 1.2 cm. Further evaluation can be obtained with dedicated nonemergent thyroid ultrasound as clinically indicated.    First 2 impression lines were discussed with a member of the clinical team by Dr. Wei  on behalf of staff radiologist at 09:45 a.m.    This report was flagged in Epic as abnormal.    Electronically  signed by resident: Chrissie Wei  Date: 05/08/2019  Time: 09:22    Electronically signed by: Fay Loyola MD  Date: 05/08/2019  Time: 10:37   X-Ray Chest 1 View [[id number]] Resulted: 05/08/19 0734   Order Status: Completed Updated: 05/08/19 0736   Narrative:     EXAMINATION:  XR CHEST 1 VIEW    CLINICAL HISTORY:  respiratory distress;    TECHNIQUE:  Note is made of the fact that the left hemothorax is obscured to a considerable degree by opacities relating to objects external to the patient.    COMPARISON:  Comparison is made to 05/04/2019.    FINDINGS:  Tracheostomy cannula again noted.  Heart size and the appearance of the cardiomediastinal silhouette have not changed appreciably since the prior examination.  However, prominent pulmonary opacity consistent with widespread airspace consolidation throughout both lungs is now appreciated, representing a detrimental interval change since the examination referenced above, the airspace consolidation somewhat more pronounced on the right side than the left.  No pleural fluid of any substantial volume is seen on either side.  No pneumothorax.  Coronary artery stent again observed, as is calcification in the wall of the aortic arch.   Impression:       Significant detrimental interval change in the appearance of the chest since 05/04/2019, relating to the development of widespread bilateral airspace consolidation which may represent pulmonary edema.      Electronically signed by: Christian Moreno MD  Date: 05/08/2019  Time: 07:34   X-Ray Chest 1 View [[id number]] Resulted: 05/04/19 0726   Order Status: Completed Updated: 05/04/19 0728   Narrative:     EXAMINATION:  XR CHEST 1 VIEW    CLINICAL HISTORY:  trying to wean from vent, also, using CXR to guide diuresis;    TECHNIQUE:  Single frontal view of the chest was performed.    COMPARISON:  05/03/2019    FINDINGS:  Tracheostomy catheter noted.  There is mild bilateral interstitial prominence.  No confluent consolidation.   No pleural effusion or pneumothorax.  Cardiomediastinal silhouette is unremarkable.   Impression:       As above.      Electronically signed by: Anders Lindsay MD  Date: 05/04/2019  Time: 07:26   US Upper Extremity Veins Right [[id number]] Resulted: 05/03/19 1842   Order Status: Completed Updated: 05/03/19 1845   Narrative:     EXAMINATION:  US UPPER EXTREMITY VEINS RIGHT    CLINICAL HISTORY:  Right arm swelling;    TECHNIQUE:  Duplex and color flow Doppler evaluation and dynamic compression was performed of the right upper extremity veins.    COMPARISON:  No relevant prior imaging for comparison    FINDINGS:  Central veins: There is partially occlusive thrombus of the distal right internal jugular vein extending into the proximal right subclavian vein.  The axillary vein is patent and free of thrombus.    Arm veins: The brachial, basilic, and cephalic veins are patent and compressible.    Contralateral subclavian/internal jugular veins: The left subclavian and internal jugular veins are patent and free of thrombus.    Other findings: None.   Impression:       Partially occlusive thrombus of the distal right internal jugular vein extending into the proximal right subclavian vein.    COMMUNICATION  This critical result was discovered/received at 1812.  The critical information above was relayed directly by John Obrien M.D. by telephone to Dr. Espino On 05/03/2019 at 1820.    Electronically signed by resident: John Obrien  Date: 05/03/2019  Time: 18:10    Electronically signed by: Edin Lo MD  Date: 05/03/2019  Time: 18:42

## 2019-05-10 NOTE — PLAN OF CARE
Problem: Occupational Therapy Goal  Goal: Occupational Therapy Goal  Goals to be met by: 5/20/19     Patient will increase functional independence with ADLs by performing:    UE Dressing with Set-up Assistance.  LE Dressing with Maximum Assistance and Assistive Devices as needed.  Grooming while standing with Minimal Assistance.  Toileting from bedside commode with Minimal Assistance for hygiene and clothing management.   Sitting at edge of bed x10 minutes with Supervision.  Rolling to Bilateral with Minimal Assistance.   Supine to sit with Minimal Assistance.  Toilet transfer to bedside commode with Minimal Assistance.     Outcome: Ongoing (interventions implemented as appropriate)  Continue POC     Shania Harding, OTR/L  [phone]  5/10/2019

## 2019-05-10 NOTE — ASSESSMENT & PLAN NOTE
ASSESSMENT/PLAN:   Mariann Huff is a 59 y.o. female s/p left ureteral injury on 4/12, who presented with fever, AMS, and intraabdominal abscess, taken for washout and ligation of left ureter with nephrostomy tube placement on 4/21.     Neuro:   - Sedated with precedex gtt.   - Pain control with fentanyl prn     Pulmonary:   - Trach in place, on PAV+, wean as tolerated  -Recent CT angio showing consolidations, possible source of fevers, will talk with ID about this today.  - ABGs prn     Cardiac:   - HDS at this time.  Pt has been weaned off pressors.   - TTE ordered yesterday, EF 55%    Renal:    - S/p transection of left ureter 4/12 and subsequent ligation and PCT nephrostomy tube placement with IR 4/21  - Monitoring BUN/Cr, acceptable today  - UOP 2 L total  - Monitor I/Os    ID:   - Blood cultures 4/12 +E. Coli; sensitivities pending. Repeat Bld Cx show NGTD.   - UCx from 4/20 growing E. Coli; sensitivities are now back. Repeat Cx pending.   - ID following for assistance with abx therapy, currently on Ceftriaxone and Rifampin.  -Given fevers overnight, will talk with ID about broadening and finding source    Hem/Onc:   - H/H stable at this time; cont to monitor    Endocrine:  - DM Type 2 at baseline    - Endocrine following for assistance with blood glucose control.     Fluids/Electrolytes/Nutrition/GI:   -TFs @ goal      PPx:  - DVT: Lovenox     DISPO:  - Continue SICU care

## 2019-05-10 NOTE — SUBJECTIVE & OBJECTIVE
Interval History/Significant Events: Febrile to 103 overnight.  WBC to 14 today.  On PAV+.    Follow-up For: Procedure(s) (LRB):  CREATION, TRACHEOSTOMY (N/A)  BRONCHOSCOPY (N/A)  EGD, WITH PEG TUBE INSERTION    Post-Operative Day: 8 Days Post-Op    Objective:     Vital Signs (Most Recent):  Temp: (!) 100.7 °F (38.2 °C) (05/10/19 0700)  Pulse: 109 (05/10/19 0730)  Resp: (!) 35 (05/10/19 0730)  BP: (!) 185/82 (05/09/19 2100)  SpO2: 100 % (05/10/19 0730) Vital Signs (24h Range):  Temp:  [99.7 °F (37.6 °C)-103.1 °F (39.5 °C)] 100.7 °F (38.2 °C)  Pulse:  [] 109  Resp:  [5-39] 35  SpO2:  [99 %-100 %] 100 %  BP: (185)/(82) 185/82  Arterial Line BP: (108-181)/(49-96) 153/71     Weight: 77.8 kg (171 lb 8.3 oz)  Body mass index is 28.54 kg/m².      Intake/Output Summary (Last 24 hours) at 5/10/2019 0809  Last data filed at 5/10/2019 0600  Gross per 24 hour   Intake 3331 ml   Output 1821 ml   Net 1510 ml       Physical Exam   Constitutional: She appears well-developed and well-nourished.   HENT:   Head: Normocephalic and atraumatic.   Tracheostomy in place   Eyes: Right eye exhibits no discharge. Left eye exhibits no discharge.   Neck: Normal range of motion. Neck supple.   Cardiovascular: Normal rate and regular rhythm.   Pulmonary/Chest: Effort normal. No respiratory distress.   Abdominal:   Midline incision c/d/i.  MADHURI with scant serous drainage. PEG with no leak. Abdomen soft, non-distended.  Right femoral lines in place, dressing CDI.   Genitourinary:   Genitourinary Comments: Nephrostomy tube in place with watery dark yellow output.  Fontenot catheter+   Musculoskeletal: Normal range of motion.   Neurological:   Able to follow commands, denying pain.    Skin: Skin is warm and dry.   Vitals reviewed.      Vents:  Vent Mode: Spont (05/10/19 0718)  Ventilator Initiated: Yes (05/08/19 0630)  Set Rate: 20 bmp (05/09/19 0506)  Vt Set: 400 mL (05/09/19 0506)  Pressure Support: 0 cmH20 (04/29/19 1014)  PEEP/CPAP: 5 cmH20  (05/10/19 0718)  Oxygen Concentration (%): 101 (05/10/19 0730)  Peak Airway Pressure: 17 cmH2O (05/10/19 0718)  Plateau Pressure: 0 cmH20 (05/10/19 0718)  Total Ve: 9.56 mL (05/10/19 0718)  Negative Inspiratory Force (cm H2O): -18 (04/27/19 1306)  F/VT Ratio<105 (RSBI): (!) 83.83 (05/10/19 0718)    Lines/Drains/Airways     Central Venous Catheter Line                 Percutaneous Central Line Insertion/Assessment - triple lumen  05/08/19 0746 right femoral 2 days          Drain                 Closed/Suction Drain 04/21/19 0300 LLQ 19 days         Nephrostomy 04/21/19 0629 Left 8 Fr. 19 days         Urethral Catheter 04/20/19 1711 Latex 16 Fr. 19 days         Gastrostomy/Enterostomy 05/02/19 1134 Percutaneous endoscopic gastrostomy (PEG) LUQ decompression;feeding 7 days          Airway                 Surgical Airway 05/02/19 1107 Shiley Cuffed 7 days          Arterial Line                 Arterial Line 05/08/19 0743 Right Femoral 2 days          Peripheral Intravenous Line                 Midline Catheter Insertion/Assessment  - Single Lumen 05/07/19 1632 Left basilic vein (medial side of arm) 18g x 8cm 2 days                Significant Labs:    CBC/Anemia Profile:  Recent Labs   Lab 05/09/19  0428 05/09/19  1201 05/09/19  2321   WBC 12.54 13.28* 14.75*   HGB 7.0* 7.4* 7.5*   HCT 22.7* 24.2* 24.4*   * 493* 464*   MCV 87 89 88   RDW 14.0 14.2 14.2        Chemistries:  Recent Labs   Lab 05/08/19  0936 05/09/19  0426 05/10/19  0330    139 140   K 4.1 5.1 4.5    105 105   CO2 25 27 23   BUN 31* 31* 29*   CREATININE 1.1 1.4 1.3   CALCIUM 8.5* 8.5* 8.5*   ALBUMIN 1.8* 1.8* 1.8*   PROT 6.2 6.1 6.2   BILITOT 0.8 1.0 0.8   ALKPHOS 97 85 119   ALT 20 17 16   AST 29 38 36   MG 1.8 2.5 2.1   PHOS 3.9 3.9 3.4       Significant Imaging:  I have reviewed all pertinent imaging results/findings within the past 24 hours.

## 2019-05-10 NOTE — PROGRESS NOTES
[facility]  Urology  Progress Note    Patient Name: Mariann Huff  MRN: [id number]  Admission Date: 4/20/2019  Hospital Length of Stay: 20 days  Code Status: Full Code   Attending Provider: Robin Boyd MD   Primary Care Physician: Jasbir Haney MD    Subjective:     HPI:  Mariann Huff is a 59 y.o. female with history of HTN, type 2 diabetes mellitus, CAD, NSTEMI, and back pain who presents to [facility] with altered mental status and sepsis. She underwent L5-S1 OLIF with NSGY on 4/12 and had intraoperative left ureteral injury with ureteroureteral anastomosis and ureteral stent placement. She did well initially and had correa and MADHURI drain removed on 4/16. She began having chills and altered mental status 2 days ago and this has progressively worsened. No complaints of pain.     She is altered and HPI is limited. In the ED she is febrile to 103 and tachycardic and pressures are low normal. WBC is 5, creatinine is 3.6 baseline 1.0, lactate is 4.6. Cath UA concerning for infection, 3+ LE, >100 WBCs, and many bacteria on microscopy.    CT and MRI abdomen both show air with minimal fluid near the surgical site with left ureter coursing through. There is air throughout the proximal collecting system which is decompressed with JJ ureteral stent in good position.    Taken for ex lap, ligation of left ureter and left neph tube placement on 4/21/19.    Interval History: Patient febrile overnight to 103.1, still febrile at 100.9. WBC 14 (from 10.) HDS without pressors. On vent at 70% FiO2. UOP  Adequate with Lasix (1225 cc from L nephrostomy, 620 cc from urethral Correa.) Cr stable at 1.3 from 1.4 yesterday.     Review of Systems  Objective:     Temp:  [99.7 °F (37.6 °C)-103.1 °F (39.5 °C)] 100.9 °F (38.3 °C)  Pulse:  [] 109  Resp:  [8-39] 25  SpO2:  [99 %-100 %] 100 %  BP: (185)/(82) 185/82  Arterial Line BP: (108-181)/(49-96) 145/63     Body mass index is 28.54 kg/m².           Drains     Drain                  Closed/Suction Drain 04/21/19 0300 LLQ 19 days         Nephrostomy 04/21/19 0629 Left 8 Fr. 19 days         Urethral Catheter 04/20/19 1711 Latex 16 Fr. 19 days         Gastrostomy/Enterostomy 05/02/19 1134 Percutaneous endoscopic gastrostomy (PEG) LUQ decompression;feeding 7 days                Physical Exam    Constitutional: She appears well-developed. No distress.   HENT:   Head: Normocephalic and atraumatic.   Neck: No JVD present.   Cardiovascular: Normal rate and regular rhythm.    Pulmonary/Chest:   On ventilator   Abdominal: Soft. She exhibits no distension. There is no rebound and no guarding.   Incision c/d/i   MADHURI drain with ss output  G-tube   Genitourinary:   Genitourinary Comments: Correa draining clear yellow  Left neph tube with clear yellow urine   Neurological: She is alert.   Skin: Skin is warm and dry. She is not diaphoretic. No pallor.      Significant Labs:    BMP:  Recent Labs   Lab 05/08/19  0936 05/09/19  0426 05/10/19  0330    139 140   K 4.1 5.1 4.5    105 105   CO2 25 27 23   BUN 31* 31* 29*   CREATININE 1.1 1.4 1.3   CALCIUM 8.5* 8.5* 8.5*       CBC:   Recent Labs   Lab 05/09/19  0428 05/09/19  1201 05/09/19  2321   WBC 12.54 13.28* 14.75*   HGB 7.0* 7.4* 7.5*   HCT 22.7* 24.2* 24.4*   * 493* 464*       Significant Imaging:  All pertinent imaging results/findings from the past 24 hours have been reviewed.                  Assessment/Plan:     * Ureteral transection of left native kidney  Mariann Huff is a 59 y.o. female s/p left ureteral injury on 4/12, presented with fever, AMS, and intraabdominal abscess, taken for washout and ligation of left ureter with neph tube placement on 4/21, Trach/PEG 5/2.    - tube feeds per SICU  - Rocephin, Rifampin - wean per SICU   - BAL on 5/8 showed no organisms   - weaned off Vanc   - febrile, slight leukocytosis  - Maintain correa, neph tube, and MADHURI drain  - Urine output adequate   - H/H stable; transfusions per SICU  -  episode of hypoxemia and bradycardia requiring bag mask ventilation; CT showed significant pulmonary edema, no PE visible (however, dosed inadequately for PE protocol); patient remains on heparin gtt, wean per SICU  - diuresis/fluids per SICU    Dispo: continue ICU care      Sepsis  As above        VTE Risk Mitigation (From admission, onward)        Ordered     IP VTE LOW RISK PATIENT  Once      05/08/19 1315     heparin 25,000 units in dextrose 5% 250 ml (100 units/mL) infusion MINIMAL INTENSITY nomogram - [facility]  Continuous      05/08/19 0932     Place sequential compression device  Until discontinued      04/20/19 3253          Mook Ferguson MD  Urology  [facility]

## 2019-05-10 NOTE — CONSULTS
[facility]  Infectious Disease  Consult Note    Patient Name: Mariann Huff  MRN: [id number]  Admission Date: 4/20/2019  Hospital Length of Stay: 20 days  Attending Physician: Robin Boyd MD  Primary Care Provider: Jasbir Haney MD         Inpatient consult to Infectious Diseases  Consult performed by: POLLY Vera Jr.  Consult ordered by: Henry Crump MD      Consult received.  Full consult to follow.      POLLY Jerome  Infectious Disease  [facility]

## 2019-05-10 NOTE — PT/OT/SLP PROGRESS
Speech Language Pathology      Mariann Huff  MRN: [id number]    Patient not seen today following discussion with MD regarding tenuous respiratory status. ST will f/u with patient for continued Passy-Kent Valve Trials when appropriate.      Tristan Robert CF-SLP  Speech-Language Pathology  Pager: [phone]

## 2019-05-10 NOTE — ASSESSMENT & PLAN NOTE
BG goal 140 - 180    Novolog 3 units q 4 hrs (wieght based dosing using 0.5 modifier)  Hold if TFs are stopped.  Low dose correction scale   BG monitoring q 4 hrs.     ** Please notify Endocrine for any change and/or advance in diet/TF**  ** Please call Endocrine for any BG related issues **    Discharge Recommendations:     TBD.

## 2019-05-10 NOTE — PROGRESS NOTES
[facility]  Critical Care - Surgery  Progress Note    Patient Name: Mariann Huff  MRN: [id number]  Admission Date: 4/20/2019  Hospital Length of Stay: 20 days  Code Status: Full Code  Attending Provider: Robin Boyd MD  Primary Care Provider: Jasbir Haney MD   Principal Problem: Ureteral transection of left native kidney    Subjective:     Hospital/ICU Course:  The patient has had 2 episodes of bradycardia during the day.  Early in the morning, the patient had difficulty breathing and a mucus plug was found in the inner cannula of the tracheostomy.  She developed bradycardia but never arrested.  She also developed hypotension.  This was followed subsequently after she was bagged (Ambu) with tachypnea and hypertension and tachycardia.  Oxygen saturations were in the low 90s, and the arterial blood gas at that time revealed a significant alveolar arterial gradient with adequate ventilation.  Chest x-ray, which I personally reviewed, revealed what appeared to be a significant fluid overload.  We did perform an echocardiogram urgently, which I personally was present and reviewed the results.  There is no evidence of heart failure.  EKG was reported as normal and there is no evidence that the patient had an acute myocardial event.  The chest x-ray did suggest a significant amount of pulmonary edema. There was also a concern, because the the history of upper body deep venous thrombosis, that this could be a pulmonary embolism.  A CT scan of the chest , which I have personally reviewed show bilateral infiltrates compatible with pulmonary edema of non cardiogenic origin.  It was not possible to rule out a pulmonary embolism.  The patient is anticoagulated currently.  Ultrasound of the lower extremities was done and they do not show any deep venous thrombosis.    The patient did receive some fluids, because of her history of acute kidney injury, which has resolved and because she was receiving IV  contrast.  She has maintained a good urinary output during the day.  She has mostly being hemodynamically stable and her tachycardia resolved.  She remains sedated and on the ventilator.  Follow-up chest x-ray, which I personally reviewed, showed resolving pulmonary edema. Her oxygenation and oxygen saturations improved and we were able to wean the patient FiO2 down to 50%.    Of note, I did perform a bronchoscopy, which failed to reveal any amount of pus or mucus plugging.  There was also no evidence of aspiration.  BAL was performed and was sent.    A 2nd episode of bradycardia was observed in the afternoon.  This responded to the use of atropine and 0.1 mg of epinephrine.  She did have a tachycardic response with this which subsided rapidly.  She remained otherwise hemodynamically stable.  Arterial blood gases, reveal now a PO2 of 300 with adequate pCO2 and pH.  Mild hyperventilation.  She remains comfortable.  I have asked Cardiology to re-evaluate this patient.  For now she remains off pressors.    Neurologically the patient has been intact. She is awake alert and oriented with a nonfocal exam.    Her abdomen remains soft.  She has been NPO for now.  She has not had any further episodes of lower GI bleed.    The patient is having a good urinary output BUN and creatinine have been grossly within normal limits with a slight elevation of BUN.  Electrolytes are being followed and replaced as necessary.    Currently no evidence of infection in this patient.  Empirically I started antibiotics, but will stop them in the next 24-48 hours.    I have had the chance to talk to the patient's  at length and in detail.  I have explained our findings, I will keep him up-to-date as to any progress in understanding the etiology of these episodes and will wait continuing to talk to him as often as necessary.      Interval History/Significant Events: Febrile to 103 overnight.  WBC to 14 today.  On PAV+.    Follow-up For:  Procedure(s) (LRB):  CREATION, TRACHEOSTOMY (N/A)  BRONCHOSCOPY (N/A)  EGD, WITH PEG TUBE INSERTION    Post-Operative Day: 8 Days Post-Op    Objective:     Vital Signs (Most Recent):  Temp: (!) 100.7 °F (38.2 °C) (05/10/19 0700)  Pulse: 109 (05/10/19 0730)  Resp: (!) 35 (05/10/19 0730)  BP: (!) 185/82 (05/09/19 2100)  SpO2: 100 % (05/10/19 0730) Vital Signs (24h Range):  Temp:  [99.7 °F (37.6 °C)-103.1 °F (39.5 °C)] 100.7 °F (38.2 °C)  Pulse:  [] 109  Resp:  [5-39] 35  SpO2:  [99 %-100 %] 100 %  BP: (185)/(82) 185/82  Arterial Line BP: (108-181)/(49-96) 153/71     Weight: 77.8 kg (171 lb 8.3 oz)  Body mass index is 28.54 kg/m².      Intake/Output Summary (Last 24 hours) at 5/10/2019 0809  Last data filed at 5/10/2019 0600  Gross per 24 hour   Intake 3331 ml   Output 1821 ml   Net 1510 ml       Physical Exam   Constitutional: She appears well-developed and well-nourished.   HENT:   Head: Normocephalic and atraumatic.   Tracheostomy in place   Eyes: Right eye exhibits no discharge. Left eye exhibits no discharge.   Neck: Normal range of motion. Neck supple.   Cardiovascular: Normal rate and regular rhythm.   Pulmonary/Chest: Effort normal. No respiratory distress.   Abdominal:   Midline incision c/d/i.  MADHURI with scant serous drainage. PEG with no leak. Abdomen soft, non-distended.  Right femoral lines in place, dressing CDI.   Genitourinary:   Genitourinary Comments: Nephrostomy tube in place with watery dark yellow output.  Fontenot catheter+   Musculoskeletal: Normal range of motion.   Neurological:   Able to follow commands, denying pain.    Skin: Skin is warm and dry.   Vitals reviewed.      Vents:  Vent Mode: Spont (05/10/19 0718)  Ventilator Initiated: Yes (05/08/19 0630)  Set Rate: 20 bmp (05/09/19 0506)  Vt Set: 400 mL (05/09/19 0506)  Pressure Support: 0 cmH20 (04/29/19 1014)  PEEP/CPAP: 5 cmH20 (05/10/19 0718)  Oxygen Concentration (%): 101 (05/10/19 0730)  Peak Airway Pressure: 17 cmH2O (05/10/19  0718)  Plateau Pressure: 0 cmH20 (05/10/19 0718)  Total Ve: 9.56 mL (05/10/19 0718)  Negative Inspiratory Force (cm H2O): -18 (04/27/19 1306)  F/VT Ratio<105 (RSBI): (!) 83.83 (05/10/19 0718)    Lines/Drains/Airways     Central Venous Catheter Line                 Percutaneous Central Line Insertion/Assessment - triple lumen  05/08/19 0746 right femoral 2 days          Drain                 Closed/Suction Drain 04/21/19 0300 LLQ 19 days         Nephrostomy 04/21/19 0629 Left 8 Fr. 19 days         Urethral Catheter 04/20/19 1711 Latex 16 Fr. 19 days         Gastrostomy/Enterostomy 05/02/19 1134 Percutaneous endoscopic gastrostomy (PEG) LUQ decompression;feeding 7 days          Airway                 Surgical Airway 05/02/19 1107 Shiley Cuffed 7 days          Arterial Line                 Arterial Line 05/08/19 0743 Right Femoral 2 days          Peripheral Intravenous Line                 Midline Catheter Insertion/Assessment  - Single Lumen 05/07/19 1632 Left basilic vein (medial side of arm) 18g x 8cm 2 days                Significant Labs:    CBC/Anemia Profile:  Recent Labs   Lab 05/09/19  0428 05/09/19  1201 05/09/19  2321   WBC 12.54 13.28* 14.75*   HGB 7.0* 7.4* 7.5*   HCT 22.7* 24.2* 24.4*   * 493* 464*   MCV 87 89 88   RDW 14.0 14.2 14.2        Chemistries:  Recent Labs   Lab 05/08/19  0936 05/09/19  0426 05/10/19  0330    139 140   K 4.1 5.1 4.5    105 105   CO2 25 27 23   BUN 31* 31* 29*   CREATININE 1.1 1.4 1.3   CALCIUM 8.5* 8.5* 8.5*   ALBUMIN 1.8* 1.8* 1.8*   PROT 6.2 6.1 6.2   BILITOT 0.8 1.0 0.8   ALKPHOS 97 85 119   ALT 20 17 16   AST 29 38 36   MG 1.8 2.5 2.1   PHOS 3.9 3.9 3.4       Significant Imaging:  I have reviewed all pertinent imaging results/findings within the past 24 hours.    Assessment/Plan:     * Ureteral transection of left native kidney    ASSESSMENT/PLAN:   Elizaamanda Huff is a 59 y.o. female s/p left ureteral injury on 4/12, who presented with fever, AMS, and  intraabdominal abscess, taken for washout and ligation of left ureter with nephrostomy tube placement on 4/21.     Neuro:   - Sedated with precedex gtt.   - Pain control with fentanyl prn     Pulmonary:   - Trach in place, on PAV+, wean as tolerated  -Recent CT angio showing consolidations, possible source of fevers, will talk with ID about this today.  - ABGs prn     Cardiac:   - HDS at this time.  Pt has been weaned off pressors.   - TTE ordered yesterday, EF 55%    Renal:    - S/p transection of left ureter 4/12 and subsequent ligation and PCT nephrostomy tube placement with IR 4/21  - Monitoring BUN/Cr, acceptable today  - UOP 2 L total  - Monitor I/Os    ID:   - Blood cultures 4/12 +E. Coli; sensitivities pending. Repeat Bld Cx show NGTD.   - UCx from 4/20 growing E. Coli; sensitivities are now back. Repeat Cx pending.   - ID following for assistance with abx therapy, currently on Ceftriaxone and Rifampin.  -Given fevers overnight, will talk with ID about broadening and finding source    Hem/Onc:   - H/H stable at this time; cont to monitor    Endocrine:  - DM Type 2 at baseline    - Endocrine following for assistance with blood glucose control.     Fluids/Electrolytes/Nutrition/GI:   -TFs @ goal      PPx:  - DVT: Lovenox     DISPO:  - Continue SICU care           Critical care was time spent personally by me on the following activities: development of treatment plan with patient or surrogate and bedside caregivers, discussions with consultants, evaluation of patient's response to treatment, examination of patient, ordering and performing treatments and interventions, ordering and review of laboratory studies, ordering and review of radiographic studies, pulse oximetry, re-evaluation of patient's condition.  This critical care time did not overlap with that of any other provider or involve time for any procedures.     Henry Crump MD  Critical Care - Surgery  [facility]

## 2019-05-10 NOTE — ASSESSMENT & PLAN NOTE
Mariann Huff is a 59 y.o. female s/p left ureteral injury on 4/12, presented with fever, AMS, and intraabdominal abscess, taken for washout and ligation of left ureter with neph tube placement on 4/21, Trach/PEG 5/2.    - tube feeds per SICU  - Rocephin, Rifampin - wean per SICU   - BAL on 5/8 showed no organisms   - weaned off Vanc   - febrile, slight leukocytosis  - Maintain correa, neph tube, and MADHURI drain  - Urine output adequate   - H/H stable; transfusions per SICU  - episode of hypoxemia and bradycardia requiring bag mask ventilation; CT showed significant pulmonary edema, no PE visible (however, dosed inadequately for PE protocol); patient remains on heparin gtt, wean per SICU  - diuresis/fluids per SICU    Dispo: continue ICU care

## 2019-05-10 NOTE — PROGRESS NOTES
"[facility]  Endocrinology  Progress Note    Admit Date: 4/20/2019     Reason for Consult: Management of T2DM, Hyperglycemia     Surgical Procedure and Date:       04/21/2019:         1. Exploratory laparotomy        2. Ligation of left ureter        3. Removal of left JJ ureteral stent      Diabetes diagnosis year: ANDRE    Home Diabetes Medications:  ANDRE  Toujeo 50 BID  Invokana 300mg daily   Novolog 35 units AM, 45 units PM, 35 units PM    How often checking glucose at home? ANDRE   BG readings on regimen: ANDRE  Hypoglycemia on the regimen?  ANDRE  Missed doses on regimen?  ANDRE    Diabetes Complications include:     Hyperglycemia and Diabetic retinopathy     Complicating diabetes co morbidities:   History of MI and MURTAZA, CAD, HLD    HPI:   Patient is a 59 y.o. female with a diagnosis of HTN, HLN, DM type 2, nonproliferative diabetic renopathy, CAD, NSTEMI, and back pain who presents to the ED with a complaint of altered mental status on 04/20/2019. Is now s/p Exploratory laparotomy, Ligation of left ureter, and Removal of left JJ ureteral stent. Endocrinology consulted to manage DM2/Hyperglycemia.    Lab Results   Component Value Date    HGBA1C 10.8 (H) 02/19/2019       Interval HPI:   Overnight events: Remains in SICU. NAEON. BG at or slightly above goal ranges, requiring  Novolog SQ correction scale.  Eating:   NPO  Nausea: No  Hypoglycemia and intervention: No  Fever: Yes- low-grade (100.7)  TPN and/or TF: Yes  TPN and/or TF rate: Peptamen @ 40 cc/hr (goal)     BP (!) 185/82   Pulse 102   Temp (!) 100.7 °F (38.2 °C) (Oral)   Resp (!) 23   Ht 5' 5" (1.651 m)   Wt 77.8 kg (171 lb 8.3 oz)   SpO2 100%   Breastfeeding? No   BMI 28.54 kg/m²      Labs Reviewed and Include    Recent Labs   Lab 05/10/19  0330   *   CALCIUM 8.5*   ALBUMIN 1.8*   PROT 6.2      K 4.5   CO2 23      BUN 29*   CREATININE 1.3   ALKPHOS 119   ALT 16   AST 36   BILITOT 0.8     Lab Results   Component Value " Date    WBC 14.75 (H) 05/09/2019    HGB 7.5 (L) 05/09/2019    HCT 24.4 (L) 05/09/2019    MCV 88 05/09/2019     (H) 05/09/2019     No results for input(s): TSH, FREET4 in the last 168 hours.  Lab Results   Component Value Date    HGBA1C 10.8 (H) 02/19/2019       Nutritional status:   Body mass index is 28.54 kg/m².  Lab Results   Component Value Date    ALBUMIN 1.8 (L) 05/10/2019    ALBUMIN 1.8 (L) 05/09/2019    ALBUMIN 1.8 (L) 05/08/2019     No results found for: PREALBUMIN    Estimated Creatinine Clearance: 48.6 mL/min (based on SCr of 1.3 mg/dL).    Accu-Checks  Recent Labs     05/08/19  0933 05/08/19  1216 05/08/19  1734 05/08/19  2012 05/09/19  0033 05/09/19  0423 05/09/19  1205 05/09/19  1751 05/09/19  2339 05/10/19  0527   POCTGLUCOSE 177* 188* 157* 148* 220* 201* 163* 156* 181* 189*       Current Medications and/or Treatments Impacting Glycemic Control  Immunotherapy:    Immunosuppressants     None        Steroids:   Hormones (From admission, onward)    None        Pressors:    Autonomic Drugs (From admission, onward)    None        Hyperglycemia/Diabetes Medications:   Antihyperglycemics (From admission, onward)    Start     Stop Route Frequency Ordered    05/08/19 1457  insulin aspart U-100 pen 1-10 Units      -- SubQ Every 6 hours PRN 05/08/19 1358          ASSESSMENT and PLAN    * Ureteral transection of left native kidney  Managed per primary team  Avoid hypoglycemia        Type 2 diabetes mellitus with diabetic peripheral angiopathy without gangrene  BG goal 140 - 180    Novolog 3 units q 4 hrs (wieght based dosing using 0.5 modifier)  Hold if TFs are stopped.  Low dose correction scale   BG monitoring q 4 hrs.     ** Please notify Endocrine for any change and/or advance in diet/TF**  ** Please call Endocrine for any BG related issues **    Discharge Recommendations:     TBD.             CAD (coronary artery disease)  Managed per primary team  Condition may cause insulin resistance       Sleep  apnea  May affect BG readings if uncontrolled        PAPA (acute kidney injury)  Caution with insulin stacking        HLD (hyperlipidemia)  Managed per primary team  Condition may cause insulin resistance         On enteral nutrition  May cause BG excursions.           Jolanta Morales NP  Endocrinology  [facility]

## 2019-05-10 NOTE — SUBJECTIVE & OBJECTIVE
"Interval HPI:   Overnight events: Remains in SICU. NAEON. BG at or slightly above goal ranges, requiring  Novolog SQ correction scale.  Eating:   NPO  Nausea: No  Hypoglycemia and intervention: No  Fever: Yes- low-grade (100.7)  TPN and/or TF: Yes  TPN and/or TF rate: Peptamen @ 40 cc/hr (goal)     BP (!) 185/82   Pulse 102   Temp (!) 100.7 °F (38.2 °C) (Oral)   Resp (!) 23   Ht 5' 5" (1.651 m)   Wt 77.8 kg (171 lb 8.3 oz)   SpO2 100%   Breastfeeding? No   BMI 28.54 kg/m²     Labs Reviewed and Include    Recent Labs   Lab 05/10/19  0330   *   CALCIUM 8.5*   ALBUMIN 1.8*   PROT 6.2      K 4.5   CO2 23      BUN 29*   CREATININE 1.3   ALKPHOS 119   ALT 16   AST 36   BILITOT 0.8     Lab Results   Component Value Date    WBC 14.75 (H) 05/09/2019    HGB 7.5 (L) 05/09/2019    HCT 24.4 (L) 05/09/2019    MCV 88 05/09/2019     (H) 05/09/2019     No results for input(s): TSH, FREET4 in the last 168 hours.  Lab Results   Component Value Date    HGBA1C 10.8 (H) 02/19/2019       Nutritional status:   Body mass index is 28.54 kg/m².  Lab Results   Component Value Date    ALBUMIN 1.8 (L) 05/10/2019    ALBUMIN 1.8 (L) 05/09/2019    ALBUMIN 1.8 (L) 05/08/2019     No results found for: PREALBUMIN    Estimated Creatinine Clearance: 48.6 mL/min (based on SCr of 1.3 mg/dL).    Accu-Checks  Recent Labs     05/08/19  0933 05/08/19  1216 05/08/19  1734 05/08/19 2012 05/09/19  0033 05/09/19  0423 05/09/19  1205 05/09/19  1751 05/09/19  2339 05/10/19  0527   POCTGLUCOSE 177* 188* 157* 148* 220* 201* 163* 156* 181* 189*       Current Medications and/or Treatments Impacting Glycemic Control  Immunotherapy:    Immunosuppressants     None        Steroids:   Hormones (From admission, onward)    None        Pressors:    Autonomic Drugs (From admission, onward)    None        Hyperglycemia/Diabetes Medications:   Antihyperglycemics (From admission, onward)    Start     Stop Route Frequency Ordered    05/08/19 1457  " insulin aspart U-100 pen 1-10 Units      -- SubQ Every 6 hours PRN 05/08/19 2811

## 2019-05-11 LAB
ALBUMIN SERPL BCP-MCNC: 1.5 G/DL (ref 3.5–5.2)
ALLENS TEST: ABNORMAL
ALP SERPL-CCNC: 102 U/L (ref 55–135)
ALT SERPL W/O P-5'-P-CCNC: 13 U/L (ref 10–44)
ANION GAP SERPL CALC-SCNC: 8 MMOL/L (ref 8–16)
ANISOCYTOSIS BLD QL SMEAR: SLIGHT
AST SERPL-CCNC: 20 U/L (ref 10–40)
BASO STIPL BLD QL SMEAR: ABNORMAL
BASOPHILS # BLD AUTO: 0.04 K/UL (ref 0–0.2)
BASOPHILS # BLD AUTO: 0.06 K/UL (ref 0–0.2)
BASOPHILS # BLD AUTO: 0.08 K/UL (ref 0–0.2)
BASOPHILS # BLD AUTO: 0.1 K/UL (ref 0–0.2)
BASOPHILS NFR BLD: 0.3 % (ref 0–1.9)
BASOPHILS NFR BLD: 0.3 % (ref 0–1.9)
BASOPHILS NFR BLD: 0.4 % (ref 0–1.9)
BASOPHILS NFR BLD: 0.5 % (ref 0–1.9)
BILIRUB SERPL-MCNC: 1.4 MG/DL (ref 0.1–1)
BLD PROD TYP BPU: NORMAL
BLD PROD TYP BPU: NORMAL
BLOOD UNIT EXPIRATION DATE: NORMAL
BLOOD UNIT EXPIRATION DATE: NORMAL
BLOOD UNIT TYPE CODE: 5100
BLOOD UNIT TYPE CODE: 5100
BLOOD UNIT TYPE: NORMAL
BLOOD UNIT TYPE: NORMAL
BUN SERPL-MCNC: 31 MG/DL (ref 6–20)
CALCIUM SERPL-MCNC: 8.5 MG/DL (ref 8.7–10.5)
CHLORIDE SERPL-SCNC: 109 MMOL/L (ref 95–110)
CO2 SERPL-SCNC: 26 MMOL/L (ref 23–29)
CODING SYSTEM: NORMAL
CODING SYSTEM: NORMAL
CREAT SERPL-MCNC: 1.2 MG/DL (ref 0.5–1.4)
DELSYS: ABNORMAL
DIFFERENTIAL METHOD: ABNORMAL
DISPENSE STATUS: NORMAL
DISPENSE STATUS: NORMAL
EOSINOPHIL # BLD AUTO: 0.7 K/UL (ref 0–0.5)
EOSINOPHIL # BLD AUTO: 1 K/UL (ref 0–0.5)
EOSINOPHIL # BLD AUTO: 1.1 K/UL (ref 0–0.5)
EOSINOPHIL # BLD AUTO: 1.1 K/UL (ref 0–0.5)
EOSINOPHIL NFR BLD: 4.4 % (ref 0–8)
EOSINOPHIL NFR BLD: 5 % (ref 0–8)
EOSINOPHIL NFR BLD: 5.8 % (ref 0–8)
EOSINOPHIL NFR BLD: 7 % (ref 0–8)
ERYTHROCYTE [DISTWIDTH] IN BLOOD BY AUTOMATED COUNT: 14 % (ref 11.5–14.5)
ERYTHROCYTE [DISTWIDTH] IN BLOOD BY AUTOMATED COUNT: 15.2 % (ref 11.5–14.5)
ERYTHROCYTE [DISTWIDTH] IN BLOOD BY AUTOMATED COUNT: 15.2 % (ref 11.5–14.5)
ERYTHROCYTE [DISTWIDTH] IN BLOOD BY AUTOMATED COUNT: 15.9 % (ref 11.5–14.5)
EST. GFR  (AFRICAN AMERICAN): 57.6 ML/MIN/1.73 M^2
EST. GFR  (NON AFRICAN AMERICAN): 49.9 ML/MIN/1.73 M^2
FIO2: 45
GLUCOSE SERPL-MCNC: 168 MG/DL (ref 70–110)
HCO3 UR-SCNC: 27.2 MMOL/L (ref 24–28)
HCT VFR BLD AUTO: 19.1 % (ref 37–48.5)
HCT VFR BLD AUTO: 23.8 % (ref 37–48.5)
HCT VFR BLD AUTO: 27.2 % (ref 37–48.5)
HCT VFR BLD AUTO: 27.2 % (ref 37–48.5)
HGB BLD-MCNC: 5.8 G/DL (ref 12–16)
HGB BLD-MCNC: 7.2 G/DL (ref 12–16)
HGB BLD-MCNC: 8.6 G/DL (ref 12–16)
HGB BLD-MCNC: 8.9 G/DL (ref 12–16)
HYPOCHROMIA BLD QL SMEAR: ABNORMAL
IMM GRANULOCYTES # BLD AUTO: 0.2 K/UL (ref 0–0.04)
IMM GRANULOCYTES # BLD AUTO: 0.2 K/UL (ref 0–0.04)
IMM GRANULOCYTES # BLD AUTO: 0.21 K/UL (ref 0–0.04)
IMM GRANULOCYTES # BLD AUTO: 0.26 K/UL (ref 0–0.04)
IMM GRANULOCYTES NFR BLD AUTO: 1.1 % (ref 0–0.5)
IMM GRANULOCYTES NFR BLD AUTO: 1.1 % (ref 0–0.5)
IMM GRANULOCYTES NFR BLD AUTO: 1.3 % (ref 0–0.5)
IMM GRANULOCYTES NFR BLD AUTO: 1.4 % (ref 0–0.5)
INR PPP: 1 (ref 0.8–1.2)
LYMPHOCYTES # BLD AUTO: 1.2 K/UL (ref 1–4.8)
LYMPHOCYTES # BLD AUTO: 1.2 K/UL (ref 1–4.8)
LYMPHOCYTES # BLD AUTO: 1.3 K/UL (ref 1–4.8)
LYMPHOCYTES # BLD AUTO: 1.5 K/UL (ref 1–4.8)
LYMPHOCYTES NFR BLD: 6.2 % (ref 18–48)
LYMPHOCYTES NFR BLD: 6.3 % (ref 18–48)
LYMPHOCYTES NFR BLD: 7.8 % (ref 18–48)
LYMPHOCYTES NFR BLD: 9.5 % (ref 18–48)
MAGNESIUM SERPL-MCNC: 2.2 MG/DL (ref 1.6–2.6)
MCH RBC QN AUTO: 27.4 PG (ref 27–31)
MCH RBC QN AUTO: 28.5 PG (ref 27–31)
MCH RBC QN AUTO: 28.8 PG (ref 27–31)
MCH RBC QN AUTO: 29.8 PG (ref 27–31)
MCHC RBC AUTO-ENTMCNC: 30.3 G/DL (ref 32–36)
MCHC RBC AUTO-ENTMCNC: 30.4 G/DL (ref 32–36)
MCHC RBC AUTO-ENTMCNC: 31.6 G/DL (ref 32–36)
MCHC RBC AUTO-ENTMCNC: 32.7 G/DL (ref 32–36)
MCV RBC AUTO: 90 FL (ref 82–98)
MCV RBC AUTO: 91 FL (ref 82–98)
MCV RBC AUTO: 91 FL (ref 82–98)
MCV RBC AUTO: 94 FL (ref 82–98)
MIN VOL: 8.2
MODE: ABNORMAL
MONOCYTES # BLD AUTO: 1 K/UL (ref 0.3–1)
MONOCYTES # BLD AUTO: 1.2 K/UL (ref 0.3–1)
MONOCYTES # BLD AUTO: 1.2 K/UL (ref 0.3–1)
MONOCYTES # BLD AUTO: 1.4 K/UL (ref 0.3–1)
MONOCYTES NFR BLD: 6 % (ref 4–15)
MONOCYTES NFR BLD: 6.1 % (ref 4–15)
MONOCYTES NFR BLD: 6.6 % (ref 4–15)
MONOCYTES NFR BLD: 8.7 % (ref 4–15)
NEUTROPHILS # BLD AUTO: 10.4 K/UL (ref 1.8–7.7)
NEUTROPHILS # BLD AUTO: 11.9 K/UL (ref 1.8–7.7)
NEUTROPHILS # BLD AUTO: 15.6 K/UL (ref 1.8–7.7)
NEUTROPHILS # BLD AUTO: 18.9 K/UL (ref 1.8–7.7)
NEUTROPHILS NFR BLD: 75.1 % (ref 38–73)
NEUTROPHILS NFR BLD: 76.8 % (ref 38–73)
NEUTROPHILS NFR BLD: 80.5 % (ref 38–73)
NEUTROPHILS NFR BLD: 81.8 % (ref 38–73)
NRBC BLD-RTO: 0 /100 WBC
OVALOCYTES BLD QL SMEAR: ABNORMAL
PCO2 BLDA: 42 MMHG (ref 35–45)
PEEP: 5
PH SMN: 7.42 [PH] (ref 7.35–7.45)
PHOSPHATE SERPL-MCNC: 3.6 MG/DL (ref 2.7–4.5)
PLATELET # BLD AUTO: 372 K/UL (ref 150–350)
PLATELET # BLD AUTO: 382 K/UL (ref 150–350)
PLATELET # BLD AUTO: 399 K/UL (ref 150–350)
PLATELET # BLD AUTO: 402 K/UL (ref 150–350)
PMV BLD AUTO: 10 FL (ref 9.2–12.9)
PMV BLD AUTO: 10.4 FL (ref 9.2–12.9)
PMV BLD AUTO: 9.7 FL (ref 9.2–12.9)
PMV BLD AUTO: 9.8 FL (ref 9.2–12.9)
PO2 BLDA: 175 MMHG (ref 80–100)
POC BE: 3 MMOL/L
POC SATURATED O2: 100 % (ref 95–100)
POC TCO2: 28 MMOL/L (ref 23–27)
POCT GLUCOSE: 169 MG/DL (ref 70–110)
POCT GLUCOSE: 174 MG/DL (ref 70–110)
POCT GLUCOSE: 178 MG/DL (ref 70–110)
POCT GLUCOSE: 185 MG/DL (ref 70–110)
POCT GLUCOSE: 190 MG/DL (ref 70–110)
POIKILOCYTOSIS BLD QL SMEAR: SLIGHT
POLYCHROMASIA BLD QL SMEAR: ABNORMAL
POTASSIUM SERPL-SCNC: 4.3 MMOL/L (ref 3.5–5.1)
PROT SERPL-MCNC: 5.8 G/DL (ref 6–8.4)
PROTHROMBIN TIME: 10.6 SEC (ref 9–12.5)
RBC # BLD AUTO: 2.12 M/UL (ref 4–5.4)
RBC # BLD AUTO: 2.53 M/UL (ref 4–5.4)
RBC # BLD AUTO: 2.99 M/UL (ref 4–5.4)
RBC # BLD AUTO: 2.99 M/UL (ref 4–5.4)
SAMPLE: ABNORMAL
SITE: ABNORMAL
SODIUM SERPL-SCNC: 143 MMOL/L (ref 136–145)
SP02: 100
SPONT RATE: 24
TRANS ERYTHROCYTES VOL PATIENT: NORMAL ML
TRANS ERYTHROCYTES VOL PATIENT: NORMAL ML
VANCOMYCIN SERPL-MCNC: 11.6 UG/ML
WBC # BLD AUTO: 13.88 K/UL (ref 3.9–12.7)
WBC # BLD AUTO: 15.51 K/UL (ref 3.9–12.7)
WBC # BLD AUTO: 19.39 K/UL (ref 3.9–12.7)
WBC # BLD AUTO: 23.15 K/UL (ref 3.9–12.7)

## 2019-05-11 PROCEDURE — 80053 COMPREHEN METABOLIC PANEL: CPT

## 2019-05-11 PROCEDURE — 99291 PR CRITICAL CARE, E/M 30-74 MINUTES: ICD-10-PCS | Mod: GC,,, | Performed by: SURGERY

## 2019-05-11 PROCEDURE — 37799 UNLISTED PX VASCULAR SURGERY: CPT

## 2019-05-11 PROCEDURE — 63600175 PHARM REV CODE 636 W HCPCS: Performed by: NURSE PRACTITIONER

## 2019-05-11 PROCEDURE — 63600175 PHARM REV CODE 636 W HCPCS: Performed by: UROLOGY

## 2019-05-11 PROCEDURE — 80202 ASSAY OF VANCOMYCIN: CPT

## 2019-05-11 PROCEDURE — 25000003 PHARM REV CODE 250: Performed by: STUDENT IN AN ORGANIZED HEALTH CARE EDUCATION/TRAINING PROGRAM

## 2019-05-11 PROCEDURE — 99900026 HC AIRWAY MAINTENANCE (STAT)

## 2019-05-11 PROCEDURE — P9021 RED BLOOD CELLS UNIT: HCPCS

## 2019-05-11 PROCEDURE — 99233 SBSQ HOSP IP/OBS HIGH 50: CPT | Mod: ,,, | Performed by: INTERNAL MEDICINE

## 2019-05-11 PROCEDURE — 99233 PR SUBSEQUENT HOSPITAL CARE,LEVL III: ICD-10-PCS | Mod: ,,, | Performed by: INTERNAL MEDICINE

## 2019-05-11 PROCEDURE — 20000000 HC ICU ROOM

## 2019-05-11 PROCEDURE — 85610 PROTHROMBIN TIME: CPT

## 2019-05-11 PROCEDURE — 85025 COMPLETE CBC W/AUTO DIFF WBC: CPT

## 2019-05-11 PROCEDURE — 84100 ASSAY OF PHOSPHORUS: CPT

## 2019-05-11 PROCEDURE — 83735 ASSAY OF MAGNESIUM: CPT

## 2019-05-11 PROCEDURE — 94761 N-INVAS EAR/PLS OXIMETRY MLT: CPT

## 2019-05-11 PROCEDURE — C9113 INJ PANTOPRAZOLE SODIUM, VIA: HCPCS | Performed by: STUDENT IN AN ORGANIZED HEALTH CARE EDUCATION/TRAINING PROGRAM

## 2019-05-11 PROCEDURE — 63600175 PHARM REV CODE 636 W HCPCS: Performed by: STUDENT IN AN ORGANIZED HEALTH CARE EDUCATION/TRAINING PROGRAM

## 2019-05-11 PROCEDURE — 27201112

## 2019-05-11 PROCEDURE — 99232 PR SUBSEQUENT HOSPITAL CARE,LEVL II: ICD-10-PCS | Mod: ,,, | Performed by: NURSE PRACTITIONER

## 2019-05-11 PROCEDURE — 99291 CRITICAL CARE FIRST HOUR: CPT | Mod: GC,,, | Performed by: SURGERY

## 2019-05-11 PROCEDURE — 82800 BLOOD PH: CPT

## 2019-05-11 PROCEDURE — 99900035 HC TECH TIME PER 15 MIN (STAT)

## 2019-05-11 PROCEDURE — 82803 BLOOD GASES ANY COMBINATION: CPT

## 2019-05-11 PROCEDURE — 27000221 HC OXYGEN, UP TO 24 HOURS

## 2019-05-11 PROCEDURE — 99232 SBSQ HOSP IP/OBS MODERATE 35: CPT | Mod: ,,, | Performed by: NURSE PRACTITIONER

## 2019-05-11 RX ORDER — HEPARIN SODIUM 5000 [USP'U]/ML
5000 INJECTION, SOLUTION INTRAVENOUS; SUBCUTANEOUS EVERY 8 HOURS
Status: DISCONTINUED | OUTPATIENT
Start: 2019-05-11 | End: 2019-05-12

## 2019-05-11 RX ORDER — HYDROCODONE BITARTRATE AND ACETAMINOPHEN 500; 5 MG/1; MG/1
TABLET ORAL
Status: DISCONTINUED | OUTPATIENT
Start: 2019-05-11 | End: 2019-05-14

## 2019-05-11 RX ADMIN — CHLORHEXIDINE GLUCONATE 0.12% ORAL RINSE 15 ML: 1.2 LIQUID ORAL at 08:05

## 2019-05-11 RX ADMIN — INSULIN ASPART 3 UNITS: 100 INJECTION, SOLUTION INTRAVENOUS; SUBCUTANEOUS at 04:05

## 2019-05-11 RX ADMIN — PIPERACILLIN AND TAZOBACTAM 4.5 G: 4; .5 INJECTION, POWDER, LYOPHILIZED, FOR SOLUTION INTRAVENOUS; PARENTERAL at 08:05

## 2019-05-11 RX ADMIN — RIFAMPIN 300 MG: 600 INJECTION, POWDER, LYOPHILIZED, FOR SOLUTION INTRAVENOUS at 03:05

## 2019-05-11 RX ADMIN — PIPERACILLIN AND TAZOBACTAM 4.5 G: 4; .5 INJECTION, POWDER, LYOPHILIZED, FOR SOLUTION INTRAVENOUS; PARENTERAL at 05:05

## 2019-05-11 RX ADMIN — HEPARIN SODIUM 5000 UNITS: 5000 INJECTION, SOLUTION INTRAVENOUS; SUBCUTANEOUS at 02:05

## 2019-05-11 RX ADMIN — HEPARIN SODIUM 5000 UNITS: 5000 INJECTION, SOLUTION INTRAVENOUS; SUBCUTANEOUS at 09:05

## 2019-05-11 RX ADMIN — PIPERACILLIN AND TAZOBACTAM 4.5 G: 4; .5 INJECTION, POWDER, LYOPHILIZED, FOR SOLUTION INTRAVENOUS; PARENTERAL at 12:05

## 2019-05-11 RX ADMIN — VANCOMYCIN HYDROCHLORIDE 750 MG: 750 INJECTION, POWDER, LYOPHILIZED, FOR SOLUTION INTRAVENOUS at 11:05

## 2019-05-11 RX ADMIN — INSULIN ASPART 3 UNITS: 100 INJECTION, SOLUTION INTRAVENOUS; SUBCUTANEOUS at 11:05

## 2019-05-11 RX ADMIN — ACETAMINOPHEN 650 MG: 325 TABLET ORAL at 09:05

## 2019-05-11 RX ADMIN — INSULIN ASPART 3 UNITS: 100 INJECTION, SOLUTION INTRAVENOUS; SUBCUTANEOUS at 08:05

## 2019-05-11 RX ADMIN — PANTOPRAZOLE SODIUM 40 MG: 40 INJECTION, POWDER, FOR SOLUTION INTRAVENOUS at 08:05

## 2019-05-11 RX ADMIN — LABETALOL HCL IV SOLN PREFILLED SYRINGE 20 MG/4ML (5 MG/ML) 10 MG: 20/4 SOLUTION PREFILLED SYRINGE at 03:05

## 2019-05-11 RX ADMIN — HYDRALAZINE HYDROCHLORIDE 10 MG: 20 INJECTION INTRAMUSCULAR; INTRAVENOUS at 05:05

## 2019-05-11 RX ADMIN — MICONAZOLE NITRATE: 20 OINTMENT TOPICAL at 08:05

## 2019-05-11 NOTE — PROGRESS NOTES
[facility]  Infectious Disease  Progress Note    Patient Name: Mariann Huff  MRN: [id number]  Admission Date: 4/20/2019  Length of Stay: 21 days  Attending Physician: Robin Boyd MD  Primary Care Provider: Jasbir Haney MD    Isolation Status: No active isolations  Assessment/Plan:      Sepsis  58 y/o female with HTN, DMII, CAD, NSTEMI, s/p L5-S1 OLIF with NSGY 4/12 c/b intraoperative left ureteral injury and underwent ureteroureteral anastomoses and ureteral stent placement. She was discharged with a correa and MADHURI drain that was removed on 4/16. She was seen by urology and anticipated stent removal in 2-3 months (6/2019).  She presents to ED with AMS and E.coli septicemia found to have air and fluid collection of left anterior prevertebral soft tissue with left ureter involvement. She underwent ex-lap and washout on 4/21. We are consulted for abx recommendations.      Per op note,  There were pocket of purulence noted and evacuated, sent for culture of left retroperitoneum to pole of left kidney. The stent was visible. Posterior to the stent there was a pocket of purulent fluid and exposed hardware along the spinal vertebrae. The tissue along the distal and proximal end of ureter were friable and unhealthy. She underwent left nephrostomy tube placement. The stent was removed and several metal clips were placed on proximal end of the ureteral.      OR cultures with Staph Lugdenensis - not currently covered.  Patient does appear to be having diarrhea - C diff negative but suspect may need restesting if WBC increases and no other source found.  QTc long at 480      Repeat BCXs sent and NGTD.  BAL done but no WBC no organisms seen - NGTD.  Source of fever and leukocytosis unclear but ABD suspected though could be non infectious - drug reaction vs PE/DVT.  CT abdomen with superficial fluid collection - possible seroma and inflammatory change around ureter.  C diff repeated and negative.  Line changes  jonathan cvc - removed.  RUE US shows partially occlusive thrombus in RIJ and proximal subclvian vein - suspected cause of fevers.  CXR clear last time seen by ID.    She has developed fever and leukocytosis but appears not septic.  The patient denies any recent fever, chills, sweats, diarrhea, abdominal pain, pain, or dysuria.  She is still bleeding from rectum per nurse. CXR with interval increased bilateral diffuse interstitial coarsening with patchy opacities in a perihilar and basilar distribution suggesting worsening pulmonary edema/CHF pattern, with sequela of superimposed aspiration or pneumonia not excluded though patient denies SOB and BAL cultures are NG.      There could be multiple causes to fever and leukocytosis:  1. Suspect may have a DVT in LUE given new arm edema - rec US  2. If US negative then may need to repeat CT ABD to assess prior fluid collection and if present then aspiration and culture is reasonable.  3. Patient is bleeding from rectum and may need CRSx consult as blood pooling and breaking down in rectum could surely produce a fever and leukocytosis - as well patient has been anemic to higher source of bleeding could be present and would need further exploration.  4.  She has had recent abdominal sx  and could have a PE as she recently had hypotension and SOB requiring ventilation support - this avenue had been explored previously and CTA done but was non diagnostic as timing of contrast bolus was not done correctly - may need to repeat if no other source found.     Plan  -Recommend discontinuing Vanc and Zosyn.  And starting pt on Cefazolin for known E. Coli and Staph Lugdenesis  -Continue Rifampin.  -Recommend working up other causes of white count as outline above.  If no improvement, would recommend CT abdomen/pelvis.  -ID Will follow                     Thank you for your consult. I will follow-up with patient. Please contact us if you have any additional questions.    Tomasz SIMMONS  JOSE Betancourt  Infectious Disease  [facility]    Subjective:     Principal Problem:Ureteral transection of left native kidney    HPI: 60 y/o female with HTN, DMII, CAD, NSTEMI, s/p L5-S1 OLIF with NSGY 4/12 sustained intraoperative laceration and underwent ureteroureteral anastomoses and ureteral stent placement. She was discharged with a correa and MADHURI drain that was removed on 4/16. She was seen by urology and anticipated stent removal in 2-3 months (6/2019).  She presents to ED with AMS and sepsis. She was febrile 103 in ED. WBC 5K on admit. Lactate 4.6. Cath UA with 3+leukocytes, >100 WBcs and many bacteria.     MRI: Postsurgical change of L5-S1 posterior spinal fusion and anterior interbody fusion with a 4.4 cm fluid fluid collection in the left anterior prevertebral soft tissues at the level of the L5-S1 disc space.  Findings may represent postoperative seroma or evolving hematoma however, urinoma not excluded.  No definite abscess although correlation is advised.    Irregular flow void involving the right common iliac vein, underlying thrombus not excluded.      CT: air collection within the anterior paraspinous soft tissues through which the left ureter courses. Given that the ureter courses through this, communication of the ureter with this collection is not excluded.  There is a ureteral stent within the left ureter.Punctate foci of air in L5-S1.  2. Air within the left renal collecting system, along the left ureter, and within the urinary bladder, correlation advised.    This was not amendable for drainage by IR so she was taken to OR for washout.     She underwent ex-lap of left ureter and left nephrostomy tube placement 4/21/19.   Per op note,  There were pocket of purulence noted and evacuated, sent for culture of left retroperitoneum to pole of left kidney. The stent was visible. Posterior to the stent there was a pocket of purulent fluid and exposed hardware along the spinal  vertebrae. The tissue along the distal and proximal end of ureter were friable and unhealthy. She underwent left nephrostomy tube placement. The stent was removed and several metal clips were placed on proximal end of the ureteral. MADHURI drain was placed into left retroperitoneal.     Blood cultures were are positive for E coli..   Urine cultures +E.coli  OR cultures were positive for Staph Ludenensis  She is was treated with zosyn. She is in the ICU, intubated, sedated    ID had seen the patient and there was concern for hardware involvement given proximity and the patient was placed on ceftriaxone and rifampin with plan for 6 weeks of abx.  ID was reconsulted due to fever and leukocytosis.  Patient denied any abdominal pain , fever, chills or sweats.  Repeat Bblood cultures and urine cultures are no growth.  Patient was broadened to Vanc, CTX and Rifampin, later vanc was stopped.  Patient improved and leukocytosis and fever resolved.  Patient had PEG and Tracheostomy.  ID signed off with plans to complete Ceftriaxone and rifampin for 6 weeks through 6/4.    Patient recently began again with progressive leukocytosis and fever so ID reconsulted.  Patient has been with low H/H and bleeding per rectum per nurse.  She has had 2 transfusions on 5/6 and 7. Patient denies any new complaints including fever, chills, sweats, N/V/D skin problems, rash, SOB, CP, pleuritic CP.  said a couple of days ago had low blood pressure and decreased O2 sats and ventilation through trache was restarted. He says bronchoscopy done 2d ago and was told it looked good. BAL cultures 5/8 are NG.  Blood cultures have been resent.  Of note patient had a subcutaneous fluid collection on last CT ABD.    Interval History: Pt febrile overnight,  Afebrile today.  White count 23.15.  Cxs w/o growth.    Review of Systems   Unable to perform ROS: Other (Trache - see HPI)     Objective:     Vital Signs (Most Recent):  Temp: 99.1 °F (37.3 °C) (05/11/19  1100)  Pulse: 93 (05/11/19 1300)  Resp: (!) 28 (05/11/19 1300)  BP: 139/65 (05/11/19 1200)  SpO2: 100 % (05/11/19 1300) Vital Signs (24h Range):  Temp:  [98.2 °F (36.8 °C)-101.2 °F (38.4 °C)] 99.1 °F (37.3 °C)  Pulse:  [] 93  Resp:  [9-45] 28  SpO2:  [100 %] 100 %  BP: ()/(42-76) 139/65  Arterial Line BP: ()/(40-97) 129/97     Weight: 76.9 kg (169 lb 8.5 oz)  Body mass index is 28.21 kg/m².    Estimated Creatinine Clearance: 52.4 mL/min (based on SCr of 1.2 mg/dL).    Physical Exam   Constitutional: She is oriented to person, place, and time. She appears well-developed and well-nourished. No distress.       HENT:   Head: Normocephalic and atraumatic.   Cardiovascular: Normal rate, regular rhythm and normal heart sounds. Exam reveals no gallop and no friction rub.   No murmur heard.  Pulmonary/Chest: Effort normal and breath sounds normal. No stridor. No respiratory distress. She has no wheezes. She has no rales.   Abdominal: Soft. Bowel sounds are normal. She exhibits no distension and no mass. There is no tenderness. There is no rebound and no guarding.   Musculoskeletal: She exhibits no edema.   Neurological: She is alert and oriented to person, place, and time.   Skin: Skin is warm and dry. She is not diaphoretic.   Psychiatric: She has a normal mood and affect. Her behavior is normal.       Significant Labs:   Blood Culture:   Recent Labs   Lab 04/24/19  0950 04/30/19  0230 04/30/19  0247 05/10/19  1015 05/10/19  1020   LABBLOO No growth after 5 days. No growth after 5 days. No growth after 5 days. No Growth to date  No Growth to date No Growth to date  No Growth to date     BMP:   Recent Labs   Lab 05/11/19  0433   *      K 4.3      CO2 26   BUN 31*   CREATININE 1.2   CALCIUM 8.5*   MG 2.2     CBC:   Recent Labs   Lab 05/10/19  2334 05/11/19  0433 05/11/19  0901   WBC 13.88* 23.15* 19.39*   HGB 5.8* 7.2* 8.6*   HCT 19.1* 23.8* 27.2*   * 399* 382*     CMP:   Recent  Labs   Lab 05/10/19  0330 05/11/19  0433    143   K 4.5 4.3    109   CO2 23 26   * 168*   BUN 29* 31*   CREATININE 1.3 1.2   CALCIUM 8.5* 8.5*   PROT 6.2 5.8*   ALBUMIN 1.8* 1.5*   BILITOT 0.8 1.4*   ALKPHOS 119 102   AST 36 20   ALT 16 13   ANIONGAP 12 8   EGFRNONAA 45.3* 49.9*     Microbiology Results (last 7 days)     Procedure Component Value Units Date/Time    Blood culture [[id number]] Collected:  05/10/19 1015    Order Status:  Completed Specimen:  Blood from Peripheral, Hand, Right Updated:  05/11/19 1212     Blood Culture, Routine No Growth to date     Blood Culture, Routine No Growth to date    Blood culture [[id number]] Collected:  05/10/19 1020    Order Status:  Completed Specimen:  Blood from Peripheral, Upper Arm, Right Updated:  05/11/19 1212     Blood Culture, Routine No Growth to date     Blood Culture, Routine No Growth to date    Culture, Respiratory with Gram Stain [[id number]] Collected:  05/08/19 1300    Order Status:  Completed Specimen:  Respiratory from BAL, RLL Updated:  05/10/19 0927     Respiratory Culture No growth     Gram Stain (Respiratory) Rare WBC's     Gram Stain (Respiratory) No organisms seen    Culture, VAP (BAL) [[id number]] Collected:  05/08/19 1300    Order Status:  Canceled Specimen:  Bronchial Alveolar Lavage from BAL, RLL Updated:  05/08/19 1425    Aerobic culture [[id number]]  (Susceptibility) Collected:  04/21/19 0125    Order Status:  Completed Specimen:  Body Fluid from Abdomen Updated:  05/06/19 1532     Aerobic Bacterial Culture --     STAPHYLOCOCCUS LUGDUNENSIS  Rare      Narrative:       Retroperitoneal fluid    Clostridium difficile EIA [[id number]] Collected:  05/05/19 1132    Order Status:  Completed Specimen:  Stool Updated:  05/05/19 1535     C. diff Antigen Negative     C difficile Toxins A+B, EIA Negative     Comment: Testing not recommended for children <24 months old.       Narrative:       [id number]    Blood culture [[id number]] Collected:   04/30/19 0230    Order Status:  Completed Specimen:  Blood from Peripheral, Hand, Right Updated:  05/05/19 0612     Blood Culture, Routine No growth after 5 days.    Blood culture [[id number]] Collected:  04/30/19 0247    Order Status:  Completed Specimen:  Blood from Peripheral, Wrist, Right Updated:  05/05/19 0612     Blood Culture, Routine No growth after 5 days.        Wound Culture:   Recent Labs   Lab 04/21/19  0125   LABAERO STAPHYLOCOCCUS LUGDUNENSIS  Rare       All pertinent labs within the past 24 hours have been reviewed.    Significant Imaging: I have reviewed all pertinent imaging results/findings within the past 24 hours.

## 2019-05-11 NOTE — SUBJECTIVE & OBJECTIVE
Interval History: Pt febrile overnight,  Afebrile today.  White count 23.15.  Cxs w/o growth.    Review of Systems   Unable to perform ROS: Other (Trache - see HPI)     Objective:     Vital Signs (Most Recent):  Temp: 99.1 °F (37.3 °C) (05/11/19 1100)  Pulse: 93 (05/11/19 1300)  Resp: (!) 28 (05/11/19 1300)  BP: 139/65 (05/11/19 1200)  SpO2: 100 % (05/11/19 1300) Vital Signs (24h Range):  Temp:  [98.2 °F (36.8 °C)-101.2 °F (38.4 °C)] 99.1 °F (37.3 °C)  Pulse:  [] 93  Resp:  [9-45] 28  SpO2:  [100 %] 100 %  BP: ()/(42-76) 139/65  Arterial Line BP: ()/(40-97) 129/97     Weight: 76.9 kg (169 lb 8.5 oz)  Body mass index is 28.21 kg/m².    Estimated Creatinine Clearance: 52.4 mL/min (based on SCr of 1.2 mg/dL).    Physical Exam   Constitutional: She is oriented to person, place, and time. She appears well-developed and well-nourished. No distress.       HENT:   Head: Normocephalic and atraumatic.   Cardiovascular: Normal rate, regular rhythm and normal heart sounds. Exam reveals no gallop and no friction rub.   No murmur heard.  Pulmonary/Chest: Effort normal and breath sounds normal. No stridor. No respiratory distress. She has no wheezes. She has no rales.   Abdominal: Soft. Bowel sounds are normal. She exhibits no distension and no mass. There is no tenderness. There is no rebound and no guarding.   Musculoskeletal: She exhibits no edema.   Neurological: She is alert and oriented to person, place, and time.   Skin: Skin is warm and dry. She is not diaphoretic.   Psychiatric: She has a normal mood and affect. Her behavior is normal.       Significant Labs:   Blood Culture:   Recent Labs   Lab 04/24/19  0950 04/30/19  0230 04/30/19  0247 05/10/19  1015 05/10/19  1020   LABBLOO No growth after 5 days. No growth after 5 days. No growth after 5 days. No Growth to date  No Growth to date No Growth to date  No Growth to date     BMP:   Recent Labs   Lab 05/11/19  0433   *      K 4.3       CO2 26   BUN 31*   CREATININE 1.2   CALCIUM 8.5*   MG 2.2     CBC:   Recent Labs   Lab 05/10/19  2334 05/11/19  0433 05/11/19  0901   WBC 13.88* 23.15* 19.39*   HGB 5.8* 7.2* 8.6*   HCT 19.1* 23.8* 27.2*   * 399* 382*     CMP:   Recent Labs   Lab 05/10/19  0330 05/11/19  0433    143   K 4.5 4.3    109   CO2 23 26   * 168*   BUN 29* 31*   CREATININE 1.3 1.2   CALCIUM 8.5* 8.5*   PROT 6.2 5.8*   ALBUMIN 1.8* 1.5*   BILITOT 0.8 1.4*   ALKPHOS 119 102   AST 36 20   ALT 16 13   ANIONGAP 12 8   EGFRNONAA 45.3* 49.9*     Microbiology Results (last 7 days)     Procedure Component Value Units Date/Time    Blood culture [[id number]] Collected:  05/10/19 1015    Order Status:  Completed Specimen:  Blood from Peripheral, Hand, Right Updated:  05/11/19 1212     Blood Culture, Routine No Growth to date     Blood Culture, Routine No Growth to date    Blood culture [[id number]] Collected:  05/10/19 1020    Order Status:  Completed Specimen:  Blood from Peripheral, Upper Arm, Right Updated:  05/11/19 1212     Blood Culture, Routine No Growth to date     Blood Culture, Routine No Growth to date    Culture, Respiratory with Gram Stain [[id number]] Collected:  05/08/19 1300    Order Status:  Completed Specimen:  Respiratory from BAL, RLL Updated:  05/10/19 0927     Respiratory Culture No growth     Gram Stain (Respiratory) Rare WBC's     Gram Stain (Respiratory) No organisms seen    Culture, VAP (BAL) [[id number]] Collected:  05/08/19 1300    Order Status:  Canceled Specimen:  Bronchial Alveolar Lavage from BAL, RLL Updated:  05/08/19 1425    Aerobic culture [[id number]]  (Susceptibility) Collected:  04/21/19 0125    Order Status:  Completed Specimen:  Body Fluid from Abdomen Updated:  05/06/19 1532     Aerobic Bacterial Culture --     STAPHYLOCOCCUS LUGDUNENSIS  Rare      Narrative:       Retroperitoneal fluid    Clostridium difficile EIA [[id number]] Collected:  05/05/19 1132    Order Status:  Completed  Specimen:  Stool Updated:  05/05/19 1535     C. diff Antigen Negative     C difficile Toxins A+B, EIA Negative     Comment: Testing not recommended for children <24 months old.       Narrative:       [id number]    Blood culture [[id number]] Collected:  04/30/19 0230    Order Status:  Completed Specimen:  Blood from Peripheral, Hand, Right Updated:  05/05/19 0612     Blood Culture, Routine No growth after 5 days.    Blood culture [[id number]] Collected:  04/30/19 0247    Order Status:  Completed Specimen:  Blood from Peripheral, Wrist, Right Updated:  05/05/19 0612     Blood Culture, Routine No growth after 5 days.        Wound Culture:   Recent Labs   Lab 04/21/19  0125   LABAERO STAPHYLOCOCCUS LUGDUNENSIS  Rare       All pertinent labs within the past 24 hours have been reviewed.    Significant Imaging: I have reviewed all pertinent imaging results/findings within the past 24 hours.

## 2019-05-11 NOTE — SUBJECTIVE & OBJECTIVE
Interval History/Significant Events: Large bloody BM overnight.  Required transfusion of 2 units.  Heparin gtt stopped and BMs less bloody now.    Follow-up For: Procedure(s) (LRB):  CREATION, TRACHEOSTOMY (N/A)  BRONCHOSCOPY (N/A)  EGD, WITH PEG TUBE INSERTION    Post-Operative Day: 9 Days Post-Op    Objective:     Vital Signs (Most Recent):  Temp: 99.3 °F (37.4 °C) (05/11/19 0700)  Pulse: 93 (05/11/19 0955)  Resp: 14 (05/11/19 0955)  BP: (!) 94/42 (05/11/19 0050)  SpO2: 100 % (05/11/19 0955) Vital Signs (24h Range):  Temp:  [98.2 °F (36.8 °C)-101.2 °F (38.4 °C)] 99.3 °F (37.4 °C)  Pulse:  [] 93  Resp:  [9-45] 14  SpO2:  [100 %] 100 %  BP: (94)/(42) 94/42  Arterial Line BP: ()/(40-84) 137/62     Weight: 76.9 kg (169 lb 8.5 oz)  Body mass index is 28.21 kg/m².      Intake/Output Summary (Last 24 hours) at 5/11/2019 1126  Last data filed at 5/11/2019 0900  Gross per 24 hour   Intake 1706 ml   Output 356 ml   Net 1350 ml       Physical Exam   Constitutional: She appears well-developed and well-nourished.   HENT:   Head: Normocephalic and atraumatic.   Tracheostomy in place   Eyes: Right eye exhibits no discharge. Left eye exhibits no discharge.   Neck: Normal range of motion. Neck supple.   Cardiovascular: Normal rate and regular rhythm.   Pulmonary/Chest: Effort normal. No respiratory distress.   Abdominal:   Midline incision c/d/i.  MADHURI with scant serous drainage. PEG with no leak. Abdomen soft, non-distended.  Right femoral lines in place, dressing CDI.   Genitourinary:   Genitourinary Comments: Nephrostomy tube in place with watery dark yellow output.  Fontenot catheter+   Musculoskeletal: Normal range of motion.   Neurological:   Able to follow commands, denying pain.    Skin: Skin is warm and dry.   Vitals reviewed.      Vents:  Vent Mode: Spont (05/11/19 0955)  Ventilator Initiated: Yes (05/08/19 0630)  Set Rate: 20 bmp (05/09/19 0506)  Vt Set: 400 mL (05/09/19 0506)  Pressure Support: 0 cmH20 (04/29/19  1014)  PEEP/CPAP: 5 cmH20 (05/11/19 0955)  Oxygen Concentration (%): 47 (05/11/19 0955)  Peak Airway Pressure: 13 cmH2O (05/11/19 0955)  Plateau Pressure: 0 cmH20 (05/11/19 0955)  Total Ve: 9.65 mL (05/11/19 0955)  Negative Inspiratory Force (cm H2O): -18 (04/27/19 1306)  F/VT Ratio<105 (RSBI): (!) 30.04 (05/11/19 0955)    Lines/Drains/Airways     Drain                 Closed/Suction Drain 04/21/19 0300 LLQ 20 days         Nephrostomy 04/21/19 0629 Left 8 Fr. 20 days         Gastrostomy/Enterostomy 05/02/19 1134 Percutaneous endoscopic gastrostomy (PEG) LUQ decompression;feeding 8 days          Airway                 Surgical Airway 05/02/19 1107 Shiley Cuffed 9 days          Arterial Line                 Arterial Line 05/08/19 0743 Right Femoral 3 days          Peripheral Intravenous Line                 Midline Catheter Insertion/Assessment  - Single Lumen 05/07/19 1632 Left basilic vein (medial side of arm) 18g x 8cm 3 days         Peripheral IV - Single Lumen 05/10/19 1020 22 G Right Upper Arm 1 day                Significant Labs:    CBC/Anemia Profile:  Recent Labs   Lab 05/10/19  2334 05/11/19  0433 05/11/19  0901   WBC 13.88* 23.15* 19.39*   HGB 5.8* 7.2* 8.6*   HCT 19.1* 23.8* 27.2*   * 399* 382*   MCV 90 94 91   RDW 14.0 15.9* 15.2*        Chemistries:  Recent Labs   Lab 05/10/19  0330 05/11/19  0433    143   K 4.5 4.3    109   CO2 23 26   BUN 29* 31*   CREATININE 1.3 1.2   CALCIUM 8.5* 8.5*   ALBUMIN 1.8* 1.5*   PROT 6.2 5.8*   BILITOT 0.8 1.4*   ALKPHOS 119 102   ALT 16 13   AST 36 20   MG 2.1 2.2   PHOS 3.4 3.6       Significant Imaging:  I have reviewed all pertinent imaging results/findings within the past 24 hours.

## 2019-05-11 NOTE — PROGRESS NOTES
"[facility]  Endocrinology  Progress Note    Admit Date: 4/20/2019     Reason for Consult: Management of T2DM, Hyperglycemia     Surgical Procedure and Date:       04/21/2019:         1. Exploratory laparotomy        2. Ligation of left ureter        3. Removal of left JJ ureteral stent      Diabetes diagnosis year: ANDRE    Home Diabetes Medications:  ANDRE  Toujeo 50 BID  Invokana 300mg daily   Novolog 35 units AM, 45 units PM, 35 units PM    How often checking glucose at home? ANDRE   BG readings on regimen: ANDRE  Hypoglycemia on the regimen?  ANDRE  Missed doses on regimen?  ANDRE    Diabetes Complications include:     Hyperglycemia and Diabetic retinopathy     Complicating diabetes co morbidities:   History of MI and MURTAZA, CAD, HLD    HPI:   Patient is a 59 y.o. female with a diagnosis of HTN, HLN, DM type 2, nonproliferative diabetic renopathy, CAD, NSTEMI, and back pain who presents to the ED with a complaint of altered mental status on 04/20/2019. Is now s/p Exploratory laparotomy, Ligation of left ureter, and Removal of left JJ ureteral stent. Endocrinology consulted to manage DM2/Hyperglycemia.    Lab Results   Component Value Date    HGBA1C 10.8 (H) 02/19/2019       Interval HPI:   Overnight events: Remains in SICU. NAEON. BG within goal ranges on scheduled Novolog and SQ correction scale.   Eating:   NPO  Nausea: No  Hypoglycemia and intervention: No  Fever: Yes- low-grade (99.3)   TPN and/or TF: Yes  If yes, type of TF/TPN and rate: Peptamen @ 40 ml/hr    BP (!) 94/42   Pulse 86   Temp 99.3 °F (37.4 °C) (Oral)   Resp (!) 24   Ht 5' 5" (1.651 m)   Wt 76.9 kg (169 lb 8.5 oz)   SpO2 100%   Breastfeeding? No   BMI 28.21 kg/m²      Labs Reviewed and Include    Recent Labs   Lab 05/11/19  0433   *   CALCIUM 8.5*   ALBUMIN 1.5*   PROT 5.8*      K 4.3   CO2 26      BUN 31*   CREATININE 1.2   ALKPHOS 102   ALT 13   AST 20   BILITOT 1.4*     Lab Results   Component Value Date    " WBC 23.15 (H) 05/11/2019    HGB 7.2 (L) 05/11/2019    HCT 23.8 (L) 05/11/2019    MCV 94 05/11/2019     (H) 05/11/2019     No results for input(s): TSH, FREET4 in the last 168 hours.  Lab Results   Component Value Date    HGBA1C 10.8 (H) 02/19/2019       Nutritional status:   Body mass index is 28.21 kg/m².  Lab Results   Component Value Date    ALBUMIN 1.5 (L) 05/11/2019    ALBUMIN 1.8 (L) 05/10/2019    ALBUMIN 1.8 (L) 05/09/2019     No results found for: PREALBUMIN    Estimated Creatinine Clearance: 52.4 mL/min (based on SCr of 1.2 mg/dL).    Accu-Checks  Recent Labs     05/09/19  0423 05/09/19  1205 05/09/19  1751 05/09/19  2339 05/10/19  0527 05/10/19  1221 05/10/19  1616 05/10/19  2050 05/10/19  2333 05/11/19  0427   POCTGLUCOSE 201* 163* 156* 181* 189* 219* 228* 196* 159* 178*       Current Medications and/or Treatments Impacting Glycemic Control  Immunotherapy:    Immunosuppressants     None        Steroids:   Hormones (From admission, onward)    None        Pressors:    Autonomic Drugs (From admission, onward)    None        Hyperglycemia/Diabetes Medications:   Antihyperglycemics (From admission, onward)    Start     Stop Route Frequency Ordered    05/11/19 1200  insulin aspart U-100 pen 3 Units      -- SubQ Every 24 hours (non-standard times) 05/10/19 1351    05/11/19 0800  insulin aspart U-100 pen 3 Units      -- SubQ Every 24 hours (non-standard times) 05/10/19 1351    05/11/19 0400  insulin aspart U-100 pen 3 Units      -- SubQ Every 24 hours (non-standard times) 05/10/19 1351    05/11/19 0000  insulin aspart U-100 pen 3 Units      -- SubQ Every 24 hours (non-standard times) 05/10/19 1351    05/10/19 2000  insulin aspart U-100 pen 3 Units      -- SubQ Every 24 hours (non-standard times) 05/10/19 1351    05/10/19 1600  insulin aspart U-100 pen 3 Units      -- SubQ Every 24 hours (non-standard times) 05/10/19 1351    05/10/19 1450  insulin aspart U-100 pen 0-5 Units      -- SubQ Every 4 hours PRN  05/10/19 1351          ASSESSMENT and PLAN    * Ureteral transection of left native kidney  Managed per primary team  Avoid hypoglycemia        Type 2 diabetes mellitus with diabetic peripheral angiopathy without gangrene  BG goal 140 - 180    Novolog 3 units q 4 hrs (wieght based dosing using 0.5 modifier)  Hold if TFs are stopped.  Low dose correction scale   BG monitoring q 4 hrs.     ** Please notify Endocrine for any change and/or advance in diet/TF**  ** Please call Endocrine for any BG related issues **    Discharge Recommendations:     TBD.             CAD (coronary artery disease)  Managed per primary team  Condition may cause insulin resistance       Sleep apnea  May affect BG readings if uncontrolled        PAPA (acute kidney injury)  Caution with insulin stacking        HLD (hyperlipidemia)  Managed per primary team  Condition may cause insulin resistance         On enteral nutrition  May cause BG excursions.           Jolanta Morales NP  Endocrinology  [facility]

## 2019-05-11 NOTE — ASSESSMENT & PLAN NOTE
ASSESSMENT/PLAN:   Mariann Huff is a 59 y.o. female s/p left ureteral injury on 4/12, who presented with fever, AMS, and intraabdominal abscess, taken for washout and ligation of left ureter with nephrostomy tube placement on 4/21.     Neuro:   - Sedated with precedex gtt.   - Pain control with fentanyl prn     Pulmonary:   - Trach in place, on PAV+, wean as tolerated  -Recent CT angio showing consolidations,on zosyn for possible aspiration pneumonia.  - ABGs prn     Cardiac:   - HDS at this time.  Pt has been weaned off pressors.   - TTE ordered recently, EF 55%    Renal:    - S/p transection of left ureter 4/12 and subsequent ligation and PCT nephrostomy tube placement with IR 4/21  - Monitoring BUN/Cr, acceptable today  - UOP 2 stable  - Monitor I/Os    ID:   - Blood cultures 4/12 +E. Coli; sensitivities pending. Repeat Bld Cx show NGTD.   - UCx from 4/20 growing E. Coli; sensitivities are now back. Repeat Cx pending.   - ID following for assistance with abx therapy, currently vanc zosyn given fevers  -Increased white count today likely from blood transfusion.  Afebrile overnight.    Hem/Onc:   - H/H stable at this time; cont to monitor    Endocrine:  - DM Type 2 at baseline    - Endocrine following for assistance with blood glucose control.     Fluids/Electrolytes/Nutrition/GI:   -TFs @ goal    PPx:  - DVT: Lovenox     DISPO:  - Continue SICU care

## 2019-05-11 NOTE — CONSULTS
[facility]  Infectious Disease  Consult Note    Patient Name: Mariann Huff  MRN: [id number]  Admission Date: 4/20/2019  Hospital Length of Stay: 20 days  Attending Physician: Robin Boyd MD  Primary Care Provider: Jasbir Haney MD     Isolation Status: No active isolations    Patient information was obtained from patient, spouse/SO and records.      Consults  Assessment/Plan:     Sepsis  58 y/o female with HTN, DMII, CAD, NSTEMI, s/p L5-S1 OLIF with NSGY 4/12 c/b intraoperative left ureteral injury and underwent ureteroureteral anastomoses and ureteral stent placement. She was discharged with a correa and MADHURI drain that was removed on 4/16. She was seen by urology and anticipated stent removal in 2-3 months (6/2019).  She presents to ED with AMS and E.coli septicemia found to have air and fluid collection of left anterior prevertebral soft tissue with left ureter involvement. She underwent ex-lap and washout on 4/21. We are consulted for abx recommendations.      Per op note,  There were pocket of purulence noted and evacuated, sent for culture of left retroperitoneum to pole of left kidney. The stent was visible. Posterior to the stent there was a pocket of purulent fluid and exposed hardware along the spinal vertebrae. The tissue along the distal and proximal end of ureter were friable and unhealthy. She underwent left nephrostomy tube placement. The stent was removed and several metal clips were placed on proximal end of the ureteral.      OR cultures with Staph Lugdenensis - not currently covered.  Patient does appear to be having diarrhea - C diff negative but suspect may need restesting if WBC increases and no other source found.  QTc long at 480      Repeat BCXs sent and NGTD.  BAL done but no WBC no organisms seen - NGTD.  Source of fever and leukocytosis unclear but ABD suspected though could be non infectious - drug reaction vs PE/DVT.  CT abdomen with superficial fluid collection -  possible seroma and inflammatory change around ureter.  C diff repeated and negative.  Line changes jonathan cvc - removed.  RUE US shows partially occlusive thrombus in RIJ and proximal subclvian vein - suspected cause of fevers.  CXR clear last time seen by ID.    She has developed fever and leukocytosis but appears not septic.  The patient denies any recent fever, chills, sweats, diarrhea, abdominal pain, pain, or dysuria.  She is still bleeding from rectum per nurse. CXR with interval increased bilateral diffuse interstitial coarsening with patchy opacities in a perihilar and basilar distribution suggesting worsening pulmonary edema/CHF pattern, with sequela of superimposed aspiration or pneumonia not excluded though patient denies SOB and BAL cultures are NG.      There could be multiple causes to fever and leukocytosis:  1. Suspect may have a DVT in LUE given new arm edema - rec US  2. If US negative then may need to repeat CT ABD to assess prior fluid collection and if present then aspiration and culture is reasonable.  3. Patient is bleeding from rectum and may need CRSx consult as blood pooling and breaking down in rectum could surely produce a fever and leukocytosis - as well patient has been anemic to higher source of bleeding could be present and would need further exploration.  4.  She has had recent abdominal sx  and could have a PE as she recently had hypotension and SOB requiring ventilation support - this avenue had been explored previously and CTA done but was non diagnostic as timing of contrast bolus was not done correctly - may need to repeat if no other source found.   Continue Vanc Zosyn for now but if no infectious cause identified, would follow plan below in recommendations.  Will follow  Have messaged primary team about US of LUE    Recommendations:  1. Continue ceftriaxone 2g q24hr and rifampin 300 mg q12hr IV (transition RIF to oral once able) given hardware in place.   2. Anticipate 6 weeks  of CTX and RIF abx from first negative blood cultures (4/23-6/4/19)  followed by oral abx suppression given retained hardware.   3. Weekly ESR, CRP,CBC, CMP faxed to ID dept at [phone]  4. FU 10-14d post DC  5.  Consider reimaging of abd in future if clinically appropriate given prior CT findings                 Thank you for your consult. I will follow-up with patient. Please contact us if you have any additional questions.    POLLY Jerome  Infectious Disease  [facility]    Subjective:     Principal Problem: Ureteral transection of left native kidney    HPI: 60 y/o female with HTN, DMII, CAD, NSTEMI, s/p L5-S1 OLIF with NSGY 4/12 sustained intraoperative laceration and underwent ureteroureteral anastomoses and ureteral stent placement. She was discharged with a correa and MADHURI drain that was removed on 4/16. She was seen by urology and anticipated stent removal in 2-3 months (6/2019).  She presents to ED with AMS and sepsis. She was febrile 103 in ED. WBC 5K on admit. Lactate 4.6. Cath UA with 3+leukocytes, >100 WBcs and many bacteria.     MRI: Postsurgical change of L5-S1 posterior spinal fusion and anterior interbody fusion with a 4.4 cm fluid fluid collection in the left anterior prevertebral soft tissues at the level of the L5-S1 disc space.  Findings may represent postoperative seroma or evolving hematoma however, urinoma not excluded.  No definite abscess although correlation is advised.    Irregular flow void involving the right common iliac vein, underlying thrombus not excluded.      CT: air collection within the anterior paraspinous soft tissues through which the left ureter courses. Given that the ureter courses through this, communication of the ureter with this collection is not excluded.  There is a ureteral stent within the left ureter.Punctate foci of air in L5-S1.  2. Air within the left renal collecting system, along the left ureter, and within the urinary bladder,  correlation advised.    This was not amendable for drainage by IR so she was taken to OR for washout.     She underwent ex-lap of left ureter and left nephrostomy tube placement 4/21/19.   Per op note,  There were pocket of purulence noted and evacuated, sent for culture of left retroperitoneum to pole of left kidney. The stent was visible. Posterior to the stent there was a pocket of purulent fluid and exposed hardware along the spinal vertebrae. The tissue along the distal and proximal end of ureter were friable and unhealthy. She underwent left nephrostomy tube placement. The stent was removed and several metal clips were placed on proximal end of the ureteral. MADHURI drain was placed into left retroperitoneal.     Blood cultures were are positive for E coli..   Urine cultures +E.coli  OR cultures were positive for Staph Ludenensis  She is was treated with zosyn. She is in the ICU, intubated, sedated    ID had seen the patient and there was concern for hardware involvement given proximity and the patient was placed on ceftriaxone and rifampin with plan for 6 weeks of abx.  ID was reconsulted due to fever and leukocytosis.  Patient denied any abdominal pain , fever, chills or sweats.  Repeat Bblood cultures and urine cultures are no growth.  Patient was broadened to Vanc, CTX and Rifampin, later vanc was stopped.  Patient improved and leukocytosis and fever resolved.  Patient had PEG and Tracheostomy.  ID signed off with plans to complete Ceftriaxone and rifampin for 6 weeks through 6/4.    Patient recently began again with progressive leukocytosis and fever so ID reconsulted.  Patient has been with low H/H and bleeding per rectum per nurse.  She has had 2 transfusions on 5/6 and 7. Patient denies any new complaints including fever, chills, sweats, N/V/D skin problems, rash, SOB, CP, pleuritic CP.  said a couple of days ago had low blood pressure and decreased O2 sats and ventilation through trache was  restarted. He says bronchoscopy done 2d ago and was told it looked good. BAL cultures  are NG.  Blood cultures have been resent.  Of note patient had a subcutaneous fluid collection on last CT ABD.      Past Medical History:   Diagnosis Date    Anticoagulant long-term use     Asthma     Back pain     Bradycardia, unspecified 2019    The etiology of the bradycardic episode is unclear.  The have appear to be respiratory in origin (at least the 1st episode).  We will continue to monitor carefully.  We are awaiting evaluation by Cardiology.      CAD (coronary artery disease)     s/p stentimg  (2), (1)    Carotid artery stenosis     Diabetes mellitus type 2 in obese     HTN (hypertension), benign     Hyperlipidemia     Keloid cicatrix     NPDR (nonproliferative diabetic retinopathy) 2015    NSTEMI (non-ST elevated myocardial infarction)     Nuclear sclerosis - Right Eye 3/18/2014    Primary localized osteoarthrosis, lower leg 2014    Sleep apnea        Past Surgical History:   Procedure Laterality Date    BRONCHOSCOPY N/A 2019    Performed by [facility] at [facility] OR 2ND FLR    CARDIAC CATHETERIZATION      cataract extraction left eye      cataracts      CATHETERIZATION, HEART, LEFT Left 2014    Performed by Wilman Kim MD at [facility] CATH LAB     SECTION, LOW TRANSVERSE      CORONARY ANGIOPLASTY      Creation, Nephrostomy, Percutaneous Left 2019    Performed by Karina Surgeon at [facility] KARINA    CREATION, TRACHEOSTOMY N/A 2019    Performed by [facility] MD at [facility] OR 2ND FLR    EGD, WITH PEG TUBE INSERTION  2019    Performed by Sean [facility] MD at [facility] OR 2ND FLR    ESOPHAGOGASTRODUODENOSCOPY (EGD) N/A 2016    Performed by Gardenia Adamson MD at [facility] ENDO (4TH FLR)    EXCISION TURBINATE, SUBMUCOUS      FUSION, SPINE, LUMBAR, ANTERIOR APPROACH Left L5-S1 Anterior to Psoa Interbody Fusion, L5-S1 Posterior  Instrumentation Left 4/12/2019    Performed by [facility]k [facility] at [facility] OR 2ND FLR    HAND SURGERY Left     HAND SURGERY Right     torn ligament repair/ Dr. Yeboah    HYSTERECTOMY      Injection,steroid,epidural,transforaminal approach - Bilateral - S1 Bilateral 9/25/2018    Performed by Tl Abreu MD at [facility] PAIN MGT    Injection-steroid-epidural-caudal N/A 2/7/2019    Performed by Dave Bentley Jr., MD at [facility] PAIN MGT    INSERTION-INTRAOCULAR LENS (IOL) Right 9/1/2015    [facility]    LAPAROTOMY, EXPLORATORY; LIGATION OF LEFT URETER; DOUBLE J STENT REMOVAL LEFT URETER Left 4/20/2019    Performed by Paul Carlson MD at [facility] OR [address] [place] [address]    left foot surgery      left wrist surgery      NASAL SEPTUM SURGERY  5/7/15    PHACOEMULSIFICATION-ASPIRATION-CATARACT Right 9/1/2015    [facility]    REPAIR, URETER  4/12/2019    Performed by Mk George MD at [facility] OR [address] [place] [address]    RESECTION-TURBINATES (SMR) N/A 5/7/2015    Performed by Dileep Dubois III, MD at [facility] OR [address] [place] [address]    rt elbow surgery      S/P LAD COATED STENT  05/14/2010    6 total     S/P OM1 STENT  08/2003    SEPTOPLASTY N/A 5/7/2015    Performed by Dileep Dubois III, MD at [facility] OR [address] [place] [address]    SINUS SURGERY      F.E.S.S.    SINUS SURGERY FUNCTIONAL ENDOSCOPIC WITH NAVIGATION WITH MAXILLARIES, ETHMOIDS, LEFT SPHENOID, LEFT LOLY N/A 5/7/2015    Performed by Dileep Dubois III, MD at [facility] OR [address] [place] [address]    STENT, URETERAL placement  4/12/2019    Performed by Robin Boyd MD at [facility] OR [address] [place] [address]    TUBAL LIGATION         Review of patient's allergies indicates:  No Known Allergies    Medications:  Medications Prior to Admission   Medication Sig    albuterol (PROVENTIL/VENTOLIN HFA) 90 mcg/actuation inhaler Inhale 2 puffs into the lungs every 6 (six) hours as needed for Wheezing.    amLODIPine (NORVASC) 10 MG tablet TAKE 1 TABLET  "(10 MG TOTAL) BY MOUTH ONCE DAILY.    aspirin 81 mg Tab Take 81 mg by mouth. 1 Tablet Oral Every day    atorvastatin (LIPITOR) 40 MG tablet TAKE 1 TABLET (40 MG TOTAL) BY MOUTH ONCE DAILY.    ACCU-CHEK EDIN PLUS METER [facility]     BD INSULIN PEN NEEDLE UF MINI 31 x 3/16 " Ndle USE 4 TIMES A DAY    blood sugar diagnostic (ACCU-CHEK EDIN PLUS TEST STRP) Strp TEST BLOOD SUGARS 4 TIMES DAILY    chlorthalidone (HYGROTEN) 50 MG Tab Take 1 tablet (50 mg total) by mouth once daily.    esomeprazole (NEXIUM) 40 MG capsule TAKE 1 CAPSULE (40 MG TOTAL) BY MOUTH BEFORE BREAKFAST.    fenofibrate micronized (LOFIBRA) 134 MG Cap TAKE 1 CAPSULE (134 MG TOTAL) BY MOUTH BEFORE BREAKFAST.    flash glucose scanning reader (FREESTYLE MISTI 14 DAY READER) Misc 1 each by Misc.(Non-Drug; Combo Route) route once daily.    flash glucose sensor (FREESTYLE MISTI 14 DAY SENSOR) Kit 1 each by Misc.(Non-Drug; Combo Route) route once daily.    gabapentin (NEURONTIN) 100 MG capsule Take 2 capsules (200 mg total) by mouth every evening.    insulin aspart U-100 (NOVOLOG FLEXPEN U-100 INSULIN) 100 unit/mL InPn pen INJECT 35 U AM, 45 U AT NOON, 35 U PM.150-200 +2, 201-250 +4, 251-300 +6, 301-350 +8, >350 +10    insulin glargine, TOUJEO, (TOUJEO SOLOSTAR U-300 INSULIN) 300 unit/mL (1.5 mL) InPn pen Inject 50 Units into the skin 2 (two) times daily.    INVOKANA 300 mg Tab tablet Take 1 tablet (300 mg total) by mouth once daily.    irbesartan (AVAPRO) 300 MG tablet Take 1 tablet (300 mg total) by mouth every evening.    lancets 30 gauge Misc     metoprolol succinate (TOPROL-XL) 100 MG 24 hr tablet TAKE 1 TABLET (100 MG TOTAL) BY MOUTH ONCE DAILY.    nitroGLYCERIN (NITROSTAT) 0.4 MG SL tablet Take one every 2-3 min till chestpain relief for 3 times and if still no relief, call MD or come to ED    omega-3 acid ethyl esters (LOVAZA) 1 gram capsule Take 1 g by mouth once daily.     pen needle, diabetic (BD ULTRA-FINE GRABIEL PEN NEEDLES) 32 " "gauge x 5/32" Ndle For use with insulin pens daily 4 times a day    prasugrel (EFFIENT) 10 mg Tab TAKE 1 TABLET (10 MG TOTAL) BY MOUTH ONCE DAILY.    tamsulosin (FLOMAX) 0.4 mg Cap Take 1 capsule (0.4 mg total) by mouth every evening.    tramadol (ULTRAM) 50 mg tablet Take 1 tablet (50 mg total) by mouth 3 (three) times daily as needed for Pain.    TRULICITY 1.5 mg/0.5 mL PnIj INJECT 1.5 MG INTO THE SKIN EVERY 7 DAYS.    zolpidem (AMBIEN) 5 MG Tab Take 1 tablet (5 mg total) by mouth nightly as needed.    [DISCONTINUED] cyclobenzaprine (FLEXERIL) 10 MG tablet TAKE 1 TABLET BY MOUTH 3 TIMES A DAY AS NEEDED FOR MUSCLE SPASMS. NO WORKING OR DRIVING ON THIS MED     Antibiotics (From admission, onward)    Start     Stop Route Frequency Ordered    05/10/19 0852  piperacillin-tazobactam 4.5 g in sodium chloride 0.9% 100 mL IVPB (ready to mix system)      -- IV Every 8 hours (non-standard times) 05/10/19 0850    05/07/19 1500  rifAMpin (RIFADIN) 300 mg in sodium chloride 0.9% 100 mL IVPB      -- IV Every 12 hours (non-standard times) 05/07/19 1249        Antifungals (From admission, onward)    Start     Stop Route Frequency Ordered    05/09/19 0930  miconazole nitrate 2% ointment      -- Top 2 times daily 05/09/19 0822        Antivirals (From admission, onward)    None           Immunization History   Administered Date(s) Administered    Influenza 11/19/2009    Influenza - Quadrivalent 10/10/2014, 10/09/2015, 10/24/2016    Influenza - Quadrivalent - PF 11/27/2017    Influenza A (H1N1) 2009 Monovalent - IM 11/19/2009    Pneumococcal Polysaccharide - 23 Valent 06/19/2014    Td - PF (ADULT) 04/17/2017       Family History     Problem Relation (Age of Onset)    Diabetes Mother, Father, Sister, Brother, Sister    Heart attack Father    Heart disease Mother    Leukemia Father    No Known Problems Sister, Brother, Brother, Maternal Grandmother, Maternal Grandfather, Paternal Grandmother, Paternal Grandfather, Son, Son, " Maternal Aunt, Maternal Uncle, Paternal Aunt, Paternal Uncle        Social History     Socioeconomic History    Marital status:      Spouse name: Shamir    Number of children: 2    Years of education: Not on file    Highest education level: Not on file   Occupational History    Occupation: [employer]     Employer: [employer]     Employer: [employer]     Employer: [employer]   Social Needs    Financial resource strain: Not on file    Food insecurity:     Worry: Not on file     Inability: Not on file    Transportation needs:     Medical: Not on file     Non-medical: Not on file   Tobacco Use    Smoking status: Never Smoker    Smokeless tobacco: Never Used   Substance and Sexual Activity    Alcohol use: No     Alcohol/week: 0.0 oz    Drug use: No    Sexual activity: Yes     Partners: Male     Birth control/protection: Post-menopausal     Comment:    Lifestyle    Physical activity:     Days per week: Not on file     Minutes per session: Not on file    Stress: Not on file   Relationships    Social connections:     Talks on phone: Not on file     Gets together: Not on file     Attends Jainism service: Not on file     Active member of club or organization: Not on file     Attends meetings of clubs or organizations: Not on file     Relationship status: Not on file   Other Topics Concern    Are you pregnant or think you may be? No    Breast-feeding No   Social History Narrative    . 2 children.      Review of Systems   Unable to perform ROS: Other (Trache - see HPI)     Objective:     Vital Signs (Most Recent):  Temp: 99.7 °F (37.6 °C) (05/10/19 1500)  Pulse: 109 (05/10/19 1600)  Resp: (!) 32 (05/10/19 1600)  BP: (!) 185/82 (05/09/19 2100)  SpO2: 100 % (05/10/19 1600) Vital Signs (24h Range):  Temp:  [99.7 °F (37.6 °C)-103.1 °F (39.5 °C)] 99.7 °F (37.6 °C)  Pulse:  [] 109  Resp:  [8-39] 32  SpO2:  [99 %-100 %] 100 %  BP: (185)/(82)  185/82  Arterial Line BP: (108-181)/(49-96) 159/61     Weight: 77.8 kg (171 lb 8.3 oz)  Body mass index is 28.54 kg/m².    Estimated Creatinine Clearance: 48.6 mL/min (based on SCr of 1.3 mg/dL).    Physical Exam   Constitutional: She is oriented to person, place, and time. She appears well-developed and well-nourished. No distress.       HENT:   Head: Normocephalic and atraumatic.   Cardiovascular: Normal rate, regular rhythm and normal heart sounds. Exam reveals no gallop and no friction rub.   No murmur heard.  Pulmonary/Chest: Effort normal and breath sounds normal. No stridor. No respiratory distress. She has no wheezes. She has no rales.   Abdominal: Soft. Bowel sounds are normal. She exhibits no distension and no mass. There is no tenderness. There is no rebound and no guarding.   Musculoskeletal: She exhibits no edema.   Neurological: She is alert and oriented to person, place, and time.   Skin: Skin is warm and dry. She is not diaphoretic.   Psychiatric: She has a normal mood and affect. Her behavior is normal.       Significant Labs:   Blood Culture:   Recent Labs   Lab 04/23/19  0430 04/23/19  0500 04/24/19  0950 04/30/19  0230 04/30/19  0247   LABBLOO No growth after 5 days. No growth after 5 days. No growth after 5 days. No growth after 5 days. No growth after 5 days.     CBC:   Recent Labs   Lab 05/09/19  1201 05/09/19  2321 05/10/19  1125   WBC 13.28* 14.75* 17.74*   HGB 7.4* 7.5* 7.5*   HCT 24.2* 24.4* 24.5*   * 464* 488*     CMP:   Recent Labs   Lab 05/09/19  0426 05/10/19  0330    140   K 5.1 4.5    105   CO2 27 23   * 184*   BUN 31* 29*   CREATININE 1.4 1.3   CALCIUM 8.5* 8.5*   PROT 6.1 6.2   ALBUMIN 1.8* 1.8*   BILITOT 1.0 0.8   ALKPHOS 85 119   AST 38 36   ALT 17 16   ANIONGAP 7* 12   EGFRNONAA 41.4* 45.3*     Respiratory Culture:   Recent Labs   Lab 04/30/19  0900 05/08/19  1300   GSRESP <10 epithelial cells per low power field.  No WBC's  No organisms seen Rare  WBC's  No organisms seen   RESPIRATORYC  --  No growth     Urine Culture:   Recent Labs   Lab 04/20/19  1711 04/21/19  0640 04/21/19  2256 04/25/19  0252   LABURIN ESCHERICHIA COLI  >100,000 cfu/ml   No growth No significant growth No growth     Urine Studies:   Recent Labs   Lab 04/25/19  0252   COLORU Argelia   APPEARANCEUA Cloudy*   PHUR 5.0   SPECGRAV 1.020   PROTEINUA 1+*   GLUCUA 3+*   KETONESU 1+*   BILIRUBINUA Negative   OCCULTUA 3+*   NITRITE Negative   LEUKOCYTESUR 3+*   RBCUA 25*   WBCUA 55*   BACTERIA Few*   SQUAMEPITHEL 1   HYALINECASTS 0     Wound Culture:   Recent Labs   Lab 04/21/19  0125   LABAERO STAPHYLOCOCCUS LUGDUNENSIS  Rare       All pertinent labs within the past 24 hours have been reviewed.    Significant Imaging: I have reviewed all pertinent imaging results/findings within the past 24 hours.   X-Ray Chest 1 View [[id number]] Resulted: 05/09/19 0801   Order Status: Completed Updated: 05/09/19 0803   Narrative:     EXAMINATION:  XR CHEST 1 VIEW    CLINICAL HISTORY:  pulmonary edema;    FINDINGS:  Trach tube T3.  There is cardiomegaly moderate-severe edema and no change.   Impression:       See above    Pulmonary edema pneumonia aspiration or sepsis.      Electronically signed by: Mart Norris MD  Date: 05/09/2019  Time: 08:01   X-Ray Chest 1 View [[id number]] Resulted: 05/08/19 1249   Order Status: Completed Updated: 05/08/19 1252   Narrative:     EXAMINATION:  XR CHEST 1 VIEW    CLINICAL HISTORY:  pre-op;    TECHNIQUE:  Single frontal view of the chest was performed.    COMPARISON:  05/08/2019 at 06:00    FINDINGS:  Tracheostomy tube with tip above the barb.  No evidence of pneumothorax.  Cardiomediastinal silhouette is stable.  There is aortic atherosclerosis with coronary stents.  Interval improvement in bilateral airspace opacities.  No evidence of large pleural effusion.  Bones demonstrate no acute abnormality.  There is a G-tube in a surgical drain projected over the left upper  quadrant.   Impression:       Improving bilateral airspace opacities.  Given the time frame this may represent improving pulmonary edema.      Electronically signed by: Les Hatfield MD  Date: 05/08/2019  Time: 12:49   US Lower Extremity Veins Bilateral [[id number]] Resulted: 05/08/19 1139   Order Status: Completed Updated: 05/08/19 1141   Narrative:     EXAMINATION:  US LOWER EXTREMITY VEINS BILATERAL    CLINICAL HISTORY:  concern for PE    TECHNIQUE:  Duplex and color flow Doppler with dynamic compression was performed of the bilateral lower extremity veins.    COMPARISON:  No relevant prior.    FINDINGS:  Right thigh veins: Overlying bandage slightly obscures the right common femoral vein however it appears to compress and the free of thrombus.  The femoral, popliteal, upper greater saphenous, and deep femoral veins are patent and free of thrombus. The veins are normally compressible and have normal phasic flow and augmentation response.    Right calf veins: The visualized calf veins are patent.    Left thigh veins: The common femoral, femoral, popliteal, upper greater saphenous, and deep femoral veins are patent and free of thrombus. The veins are normally compressible and have normal phasic flow and augmentation response.    Left calf veins: The visualized calf veins are patent.    Miscellaneous: None.   Impression:       No evidence of deep venous thrombosis in either lower extremity.    Electronically signed by resident: Aime Alexandre  Date: 05/08/2019  Time: 11:05    Electronically signed by: Paul Dunne MD  Date: 05/08/2019  Time: 11:39   CTA Chest Non Coronary [[id number]] (Abnormal) Resulted: 05/08/19 1037   Order Status: Completed Updated: 05/08/19 1040   Narrative:     EXAMINATION:  CTA CHEST NON CORONARY    CLINICAL HISTORY:  Chest pain, acute, PE suspected, high pretest prob    TECHNIQUE:  Axial image were obtained from the thoracic inlet to the lung bases following the IV administration of 75 mL of  Omnipaque 350.  Coronal, sagittal and MIPS reformats were obtained as well.    COMPARISON:  Multiple chest radiographs including the most recent chest radiograph dating 05/08/2019.    FINDINGS:  Tubes and lines: Tracheostomy tube in place terminating above the barb.    Base of Neck: Hypodensity in the left thyroid lobe measuring 1.2 cm.  Further evaluation can be obtained with dedicated nonemergent thyroid ultrasound as clinically indicated.    Thoracic soft tissues: Unremarkable.    Aorta: Left-sided aortic arch.  No aneurysm.  Calcific atherosclerosis of the aorta and coronary arteries.    Heart: Normal size. No effusion.    Pulmonary vasculature: Pulmonary arteries distribute normally.  This is nondiagnostic exam for pulmonary thromboembolism as the bolus timing was missed for the pulmonary arteries.  Examination was not repeated secondary to patient's clinical condition.    Faye/Mediastinum: No pathologic anitha enlargement.    Airways: Patent.    Lungs/Pleura: Perihilar predominant pulmonary opacities.  Small bilateral pleural fluid.  No pneumothorax.    Esophagus: Unremarkable.    Upper Abdomen: Few hypodense lesions in the left kidney, likely representing cysts.  There is a gastrostomy tube.    Bones: No acute fracture. No suspicious lytic or sclerotic lesions.  DJD of the spine.   Impression:       Nondiagnostic exam for pulmonary thromboembolism as the bolus timing was missed for the pulmonary arteries. Examination was not repeated secondary to patient's clinical condition.    Perihilar predominant pulmonary opacities along with bilateral pleural effusion, findings are concerning for pulmonary edema.  Aspiration is thought to be less likely.    Calcific atherosclerosis of the aorta and coronary arteries.    Hypodensity in the left thyroid lobe measuring 1.2 cm. Further evaluation can be obtained with dedicated nonemergent thyroid ultrasound as clinically indicated.    First 2 impression lines were  discussed with a member of the clinical team by Dr. Wei  on behalf of staff radiologist at 09:45 a.m.    This report was flagged in Epic as abnormal.    Electronically signed by resident: Chrissie Wei  Date: 05/08/2019  Time: 09:22    Electronically signed by: Fay Loyola MD  Date: 05/08/2019  Time: 10:37   X-Ray Chest 1 View [[id number]] Resulted: 05/08/19 0734   Order Status: Completed Updated: 05/08/19 0736   Narrative:     EXAMINATION:  XR CHEST 1 VIEW    CLINICAL HISTORY:  respiratory distress;    TECHNIQUE:  Note is made of the fact that the left hemothorax is obscured to a considerable degree by opacities relating to objects external to the patient.    COMPARISON:  Comparison is made to 05/04/2019.    FINDINGS:  Tracheostomy cannula again noted.  Heart size and the appearance of the cardiomediastinal silhouette have not changed appreciably since the prior examination.  However, prominent pulmonary opacity consistent with widespread airspace consolidation throughout both lungs is now appreciated, representing a detrimental interval change since the examination referenced above, the airspace consolidation somewhat more pronounced on the right side than the left.  No pleural fluid of any substantial volume is seen on either side.  No pneumothorax.  Coronary artery stent again observed, as is calcification in the wall of the aortic arch.   Impression:       Significant detrimental interval change in the appearance of the chest since 05/04/2019, relating to the development of widespread bilateral airspace consolidation which may represent pulmonary edema.      Electronically signed by: Christian Moreno MD  Date: 05/08/2019  Time: 07:34   X-Ray Chest 1 View [[id number]] Resulted: 05/04/19 0726   Order Status: Completed Updated: 05/04/19 0728   Narrative:     EXAMINATION:  XR CHEST 1 VIEW    CLINICAL HISTORY:  trying to wean from vent, also, using CXR to guide diuresis;    TECHNIQUE:  Single frontal view of the  chest was performed.    COMPARISON:  05/03/2019    FINDINGS:  Tracheostomy catheter noted.  There is mild bilateral interstitial prominence.  No confluent consolidation.  No pleural effusion or pneumothorax.  Cardiomediastinal silhouette is unremarkable.   Impression:       As above.      Electronically signed by: Anders Lindsay MD  Date: 05/04/2019  Time: 07:26   US Upper Extremity Veins Right [[id number]] Resulted: 05/03/19 1842   Order Status: Completed Updated: 05/03/19 1845   Narrative:     EXAMINATION:  US UPPER EXTREMITY VEINS RIGHT    CLINICAL HISTORY:  Right arm swelling;    TECHNIQUE:  Duplex and color flow Doppler evaluation and dynamic compression was performed of the right upper extremity veins.    COMPARISON:  No relevant prior imaging for comparison    FINDINGS:  Central veins: There is partially occlusive thrombus of the distal right internal jugular vein extending into the proximal right subclavian vein.  The axillary vein is patent and free of thrombus.    Arm veins: The brachial, basilic, and cephalic veins are patent and compressible.    Contralateral subclavian/internal jugular veins: The left subclavian and internal jugular veins are patent and free of thrombus.    Other findings: None.   Impression:       Partially occlusive thrombus of the distal right internal jugular vein extending into the proximal right subclavian vein.    COMMUNICATION  This critical result was discovered/received at 1812.  The critical information above was relayed directly by John Obrien M.D. by telephone to Dr. Espino On 05/03/2019 at 1820.    Electronically signed by resident: John Obrien  Date: 05/03/2019  Time: 18:10    Electronically signed by: Edin Lo MD  Date: 05/03/2019  Time: 18:42

## 2019-05-11 NOTE — PROGRESS NOTES
Patient seen today.    Awake, following commands    Loose bloody stool per rectum.    Anterior fusion wound healing well    Posterior lumbar incisions with 1 cm area ulcerated in each incision with scab    Staples removed    Wound care ordered for [facility] treatment     met    All questions answered

## 2019-05-11 NOTE — PROGRESS NOTES
[facility]  Critical Care - Surgery  Progress Note    Patient Name: Mariann Huff  MRN: [id number]  Admission Date: 4/20/2019  Hospital Length of Stay: 21 days  Code Status: Full Code  Attending Provider: Robin Boyd MD  Primary Care Provider: Jasbir Haney MD   Principal Problem: Ureteral transection of left native kidney    Subjective:     Hospital/ICU Course:  The patient has had 2 episodes of bradycardia during the day.  Early in the morning, the patient had difficulty breathing and a mucus plug was found in the inner cannula of the tracheostomy.  She developed bradycardia but never arrested.  She also developed hypotension.  This was followed subsequently after she was bagged (Ambu) with tachypnea and hypertension and tachycardia.  Oxygen saturations were in the low 90s, and the arterial blood gas at that time revealed a significant alveolar arterial gradient with adequate ventilation.  Chest x-ray, which I personally reviewed, revealed what appeared to be a significant fluid overload.  We did perform an echocardiogram urgently, which I personally was present and reviewed the results.  There is no evidence of heart failure.  EKG was reported as normal and there is no evidence that the patient had an acute myocardial event.  The chest x-ray did suggest a significant amount of pulmonary edema. There was also a concern, because the the history of upper body deep venous thrombosis, that this could be a pulmonary embolism.  A CT scan of the chest , which I have personally reviewed show bilateral infiltrates compatible with pulmonary edema of non cardiogenic origin.  It was not possible to rule out a pulmonary embolism.  The patient is anticoagulated currently.  Ultrasound of the lower extremities was done and they do not show any deep venous thrombosis.    The patient did receive some fluids, because of her history of acute kidney injury, which has resolved and because she was receiving IV  contrast.  She has maintained a good urinary output during the day.  She has mostly being hemodynamically stable and her tachycardia resolved.  She remains sedated and on the ventilator.  Follow-up chest x-ray, which I personally reviewed, showed resolving pulmonary edema. Her oxygenation and oxygen saturations improved and we were able to wean the patient FiO2 down to 50%.    Of note, I did perform a bronchoscopy, which failed to reveal any amount of pus or mucus plugging.  There was also no evidence of aspiration.  BAL was performed and was sent.    A 2nd episode of bradycardia was observed in the afternoon.  This responded to the use of atropine and 0.1 mg of epinephrine.  She did have a tachycardic response with this which subsided rapidly.  She remained otherwise hemodynamically stable.  Arterial blood gases, reveal now a PO2 of 300 with adequate pCO2 and pH.  Mild hyperventilation.  She remains comfortable.  I have asked Cardiology to re-evaluate this patient.  For now she remains off pressors.    Neurologically the patient has been intact. She is awake alert and oriented with a nonfocal exam.    Her abdomen remains soft.  She has been NPO for now.  She has not had any further episodes of lower GI bleed.    The patient is having a good urinary output BUN and creatinine have been grossly within normal limits with a slight elevation of BUN.  Electrolytes are being followed and replaced as necessary.    Currently no evidence of infection in this patient.  Empirically I started antibiotics, but will stop them in the next 24-48 hours.    I have had the chance to talk to the patient's  at length and in detail.  I have explained our findings, I will keep him up-to-date as to any progress in understanding the etiology of these episodes and will wait continuing to talk to him as often as necessary.      Interval History/Significant Events: Large bloody BM overnight.  Required transfusion of 2 units.  Heparin gtt  stopped and BMs less bloody now.    Follow-up For: Procedure(s) (LRB):  CREATION, TRACHEOSTOMY (N/A)  BRONCHOSCOPY (N/A)  EGD, WITH PEG TUBE INSERTION    Post-Operative Day: 9 Days Post-Op    Objective:     Vital Signs (Most Recent):  Temp: 99.3 °F (37.4 °C) (05/11/19 0700)  Pulse: 93 (05/11/19 0955)  Resp: 14 (05/11/19 0955)  BP: (!) 94/42 (05/11/19 0050)  SpO2: 100 % (05/11/19 0955) Vital Signs (24h Range):  Temp:  [98.2 °F (36.8 °C)-101.2 °F (38.4 °C)] 99.3 °F (37.4 °C)  Pulse:  [] 93  Resp:  [9-45] 14  SpO2:  [100 %] 100 %  BP: (94)/(42) 94/42  Arterial Line BP: ()/(40-84) 137/62     Weight: 76.9 kg (169 lb 8.5 oz)  Body mass index is 28.21 kg/m².      Intake/Output Summary (Last 24 hours) at 5/11/2019 1126  Last data filed at 5/11/2019 0900  Gross per 24 hour   Intake 1706 ml   Output 356 ml   Net 1350 ml       Physical Exam   Constitutional: She appears well-developed and well-nourished.   HENT:   Head: Normocephalic and atraumatic.   Tracheostomy in place   Eyes: Right eye exhibits no discharge. Left eye exhibits no discharge.   Neck: Normal range of motion. Neck supple.   Cardiovascular: Normal rate and regular rhythm.   Pulmonary/Chest: Effort normal. No respiratory distress.   Abdominal:   Midline incision c/d/i.  MADHURI with scant serous drainage. PEG with no leak. Abdomen soft, non-distended.  Right femoral lines in place, dressing CDI.   Genitourinary:   Genitourinary Comments: Nephrostomy tube in place with watery dark yellow output.  Fontenot catheter+   Musculoskeletal: Normal range of motion.   Neurological:   Able to follow commands, denying pain.    Skin: Skin is warm and dry.   Vitals reviewed.      Vents:  Vent Mode: Spont (05/11/19 0955)  Ventilator Initiated: Yes (05/08/19 0630)  Set Rate: 20 bmp (05/09/19 0506)  Vt Set: 400 mL (05/09/19 0506)  Pressure Support: 0 cmH20 (04/29/19 1014)  PEEP/CPAP: 5 cmH20 (05/11/19 0955)  Oxygen Concentration (%): 47 (05/11/19 0955)  Peak Airway Pressure:  13 cmH2O (05/11/19 0955)  Plateau Pressure: 0 cmH20 (05/11/19 0955)  Total Ve: 9.65 mL (05/11/19 0955)  Negative Inspiratory Force (cm H2O): -18 (04/27/19 1306)  F/VT Ratio<105 (RSBI): (!) 30.04 (05/11/19 0955)    Lines/Drains/Airways     Drain                 Closed/Suction Drain 04/21/19 0300 LLQ 20 days         Nephrostomy 04/21/19 0629 Left 8 Fr. 20 days         Gastrostomy/Enterostomy 05/02/19 1134 Percutaneous endoscopic gastrostomy (PEG) LUQ decompression;feeding 8 days          Airway                 Surgical Airway 05/02/19 1107 Shiley Cuffed 9 days          Arterial Line                 Arterial Line 05/08/19 0743 Right Femoral 3 days          Peripheral Intravenous Line                 Midline Catheter Insertion/Assessment  - Single Lumen 05/07/19 1632 Left basilic vein (medial side of arm) 18g x 8cm 3 days         Peripheral IV - Single Lumen 05/10/19 1020 22 G Right Upper Arm 1 day                Significant Labs:    CBC/Anemia Profile:  Recent Labs   Lab 05/10/19  2334 05/11/19  0433 05/11/19  0901   WBC 13.88* 23.15* 19.39*   HGB 5.8* 7.2* 8.6*   HCT 19.1* 23.8* 27.2*   * 399* 382*   MCV 90 94 91   RDW 14.0 15.9* 15.2*        Chemistries:  Recent Labs   Lab 05/10/19  0330 05/11/19  0433    143   K 4.5 4.3    109   CO2 23 26   BUN 29* 31*   CREATININE 1.3 1.2   CALCIUM 8.5* 8.5*   ALBUMIN 1.8* 1.5*   PROT 6.2 5.8*   BILITOT 0.8 1.4*   ALKPHOS 119 102   ALT 16 13   AST 36 20   MG 2.1 2.2   PHOS 3.4 3.6       Significant Imaging:  I have reviewed all pertinent imaging results/findings within the past 24 hours.    Assessment/Plan:     * Ureteral transection of left native kidney    ASSESSMENT/PLAN:   Orantoinette Huff is a 59 y.o. female s/p left ureteral injury on 4/12, who presented with fever, AMS, and intraabdominal abscess, taken for washout and ligation of left ureter with nephrostomy tube placement on 4/21.     Neuro:   - Sedated with precedex gtt.   - Pain control with fentanyl  prn     Pulmonary:   - Trach in place, on PAV+, wean as tolerated  -Recent CT angio showing consolidations,on zosyn for possible aspiration pneumonia.  - ABGs prn     Cardiac:   - HDS at this time.  Pt has been weaned off pressors.   - TTE ordered recently, EF 55%    Renal:    - S/p transection of left ureter 4/12 and subsequent ligation and PCT nephrostomy tube placement with IR 4/21  - Monitoring BUN/Cr, acceptable today  - UOP 2 stable  - Monitor I/Os    ID:   - Blood cultures 4/12 +E. Coli; sensitivities pending. Repeat Bld Cx show NGTD.   - UCx from 4/20 growing E. Coli; sensitivities are now back. Repeat Cx pending.   - ID following for assistance with abx therapy, currently vanc zosyn given fevers  -Increased white count today likely from blood transfusion.  Afebrile overnight.    Hem/Onc:   - H/H stable at this time; cont to monitor    Endocrine:  - DM Type 2 at baseline    - Endocrine following for assistance with blood glucose control.     Fluids/Electrolytes/Nutrition/GI:   -TFs @ goal    PPx:  - DVT: Lovenox     DISPO:  - Continue SICU care               Critical care was time spent personally by me on the following activities: development of treatment plan with patient or surrogate and bedside caregivers, discussions with consultants, evaluation of patient's response to treatment, examination of patient, ordering and performing treatments and interventions, ordering and review of laboratory studies, ordering and review of radiographic studies, pulse oximetry, re-evaluation of patient's condition.  This critical care time did not overlap with that of any other provider or involve time for any procedures.     Henry Crump MD  Critical Care - Surgery  [facility]

## 2019-05-11 NOTE — ASSESSMENT & PLAN NOTE
Elizaamanda Huff is a 59 y.o. female s/p left ureteral injury on 4/12, presented with fever, AMS, and intraabdominal abscess, taken for washout and ligation of left ureter with neph tube placement on 4/21, Trach/PEG 5/2.    - tube feeds per SICU  - Zosyn, Vancomycin, Rifampin - wean per SICU   - BAL on 5/8 showed no organisms   - febrile overnight, WBC up to 23 this AM  - Maintain neph tube, and MADHURI drain, Fontenot removed  - Urine output adequate   - H/H dropped yesterday; transfusions per SICU (2 units overnight)  - heparin gtt held for rectal bleeding, wean per SICU  - diuresis/fluids per SICU    Dispo: continue ICU care

## 2019-05-11 NOTE — ASSESSMENT & PLAN NOTE
60 y/o female with HTN, DMII, CAD, NSTEMI, s/p L5-S1 OLIF with NSGY 4/12 c/b intraoperative left ureteral injury and underwent ureteroureteral anastomoses and ureteral stent placement. She was discharged with a correa and MADHURI drain that was removed on 4/16. She was seen by urology and anticipated stent removal in 2-3 months (6/2019).  She presents to ED with AMS and E.coli septicemia found to have air and fluid collection of left anterior prevertebral soft tissue with left ureter involvement. She underwent ex-lap and washout on 4/21. We are consulted for abx recommendations.      Per op note,  There were pocket of purulence noted and evacuated, sent for culture of left retroperitoneum to pole of left kidney. The stent was visible. Posterior to the stent there was a pocket of purulent fluid and exposed hardware along the spinal vertebrae. The tissue along the distal and proximal end of ureter were friable and unhealthy. She underwent left nephrostomy tube placement. The stent was removed and several metal clips were placed on proximal end of the ureteral.      OR cultures with Staph Lugdenensis - not currently covered.  Patient does appear to be having diarrhea - C diff negative but suspect may need restesting if WBC increases and no other source found.  QTc long at 480      Repeat BCXs sent and NGTD.  BAL done but no WBC no organisms seen - NGTD.  Source of fever and leukocytosis unclear but ABD suspected though could be non infectious - drug reaction vs PE/DVT.  CT abdomen with superficial fluid collection - possible seroma and inflammatory change around ureter.  C diff repeated and negative.  Line changes jonathan cvc - removed.  RUE US shows partially occlusive thrombus in RIJ and proximal subclvian vein - suspected cause of fevers.  CXR clear last time seen by ID.    She has developed fever and leukocytosis but appears not septic.  The patient denies any recent fever, chills, sweats, diarrhea, abdominal pain, pain, or  dysuria.  She is still bleeding from rectum per nurse. CXR with interval increased bilateral diffuse interstitial coarsening with patchy opacities in a perihilar and basilar distribution suggesting worsening pulmonary edema/CHF pattern, with sequela of superimposed aspiration or pneumonia not excluded though patient denies SOB and BAL cultures are NG.      There could be multiple causes to fever and leukocytosis:  1. Suspect may have a DVT in LUE given new arm edema - rec US  2. If US negative then may need to repeat CT ABD to assess prior fluid collection and if present then aspiration and culture is reasonable.  3. Patient is bleeding from rectum and may need CRSx consult as blood pooling and breaking down in rectum could surely produce a fever and leukocytosis - as well patient has been anemic to higher source of bleeding could be present and would need further exploration.  4.  She has had recent abdominal sx  and could have a PE as she recently had hypotension and SOB requiring ventilation support - this avenue had been explored previously and CTA done but was non diagnostic as timing of contrast bolus was not done correctly - may need to repeat if no other source found.   Continue Vanc Zosyn for now but if no infectious cause identified, would follow plan below in recommendations.  Will follow  Have messaged primary team about US of LUE    Recommendations:  1. Continue ceftriaxone 2g q24hr and rifampin 300 mg q12hr IV (transition RIF to oral once able) given hardware in place.   2. Anticipate 6 weeks of CTX and RIF abx from first negative blood cultures (4/23-6/4/19)  followed by oral abx suppression given retained hardware.   3. Weekly ESR, CRP,CBC, CMP faxed to ID dept at [phone]  4. FU 10-14d post DC  5.  Consider reimaging of abd in future if clinically appropriate given prior CT findings

## 2019-05-11 NOTE — PLAN OF CARE
Pt remained free from fall or injury this shift. VSS and within goal. Pt tolerated trach collar well. No abnormal response or VS. No complaints of pain this shift. Blood cultures sent in AM. Pt had loose/liquid BM x 6. Dark orange/brown to bright red throughout shift. MD made aware. MD packed rectum with surgicel. CBC sent, stable results. Pt correa catheter and central line d/c per Dr. Sagastume. Heparin gtt d/c per MD order. Plan of care reviewed with pt and spouse, all questions answered. Will continue to monitor.

## 2019-05-11 NOTE — PROGRESS NOTES
[facility]  Urology  Progress Note    Patient Name: Mariann Huff  MRN: [id number]  Admission Date: 4/20/2019  Hospital Length of Stay: 21 days  Code Status: Full Code   Attending Provider: Robin Boyd MD   Primary Care Physician: Jasbir Haney MD    Subjective:     HPI:  Mariann Huff is a 59 y.o. female with history of HTN, type 2 diabetes mellitus, CAD, NSTEMI, and back pain who presents to [facility] with altered mental status and sepsis. She underwent L5-S1 OLIF with NSGY on 4/12 and had intraoperative left ureteral injury with ureteroureteral anastomosis and ureteral stent placement. She did well initially and had correa and MADHURI drain removed on 4/16. She began having chills and altered mental status 2 days ago and this has progressively worsened. No complaints of pain.     She is altered and HPI is limited. In the ED she is febrile to 103 and tachycardic and pressures are low normal. WBC is 5, creatinine is 3.6 baseline 1.0, lactate is 4.6. Cath UA concerning for infection, 3+ LE, >100 WBCs, and many bacteria on microscopy.    CT and MRI abdomen both show air with minimal fluid near the surgical site with left ureter coursing through. There is air throughout the proximal collecting system which is decompressed with JJ ureteral stent in good position.    Taken for ex lap, ligation of left ureter and left neph tube placement on 4/21/19.    Interval History:   Tmax 101.2 overnight  2 units PRBCs given overnight for drop in H&H  Several bloody bowel movements overnight (packed with quikclot)  Heparin gtt currently held    Review of Systems    Objective:     Temp:  [98.2 °F (36.8 °C)-101.2 °F (38.4 °C)] 99.3 °F (37.4 °C)  Pulse:  [] 86  Resp:  [9-45] 24  SpO2:  [99 %-100 %] 100 %  BP: (94)/(42) 94/42  Arterial Line BP: ()/(40-84) 100/76     Body mass index is 28.21 kg/m².      Bladder Scan Volume (mL): 27 mL (05/10/19 0846)  Post Void Cath Residual Output (mL): 27 mL (05/10/19  0846)    Drains     Drain                 Closed/Suction Drain 04/21/19 0300 LLQ 18 days         Nephrostomy 04/21/19 0629 Left 8 Fr. 18 days         Urethral Catheter 04/20/19 1711 Latex 16 Fr. 18 days         Gastrostomy/Enterostomy 05/02/19 1134 Percutaneous endoscopic gastrostomy (PEG) LUQ decompression;feeding 6 days                Physical Exam   Constitutional: She appears well-developed. No distress.   HENT:   Head: Normocephalic and atraumatic.   Neck: No JVD present.   Cardiovascular: Normal rate and regular rhythm.    Pulmonary/Chest:   On ventilator   Abdominal: Soft. She exhibits no distension. There is no rebound and no guarding.   Incision c/d/i   MADHURI drain with ss output  G-tube   Genitourinary:   Genitourinary Comments: Fontenot removed  Left neph tube with clear yellow urine   Neurological: She is alert.   Skin: Skin is warm and dry. She is not diaphoretic. No pallor.         Significant Labs:    BMP:  Recent Labs   Lab 05/09/19  0426 05/10/19  0330 05/11/19  0433    140 143   K 5.1 4.5 4.3    105 109   CO2 27 23 26   BUN 31* 29* 31*   CREATININE 1.4 1.3 1.2   CALCIUM 8.5* 8.5* 8.5*       CBC:   Recent Labs   Lab 05/10/19  1125 05/10/19  2334 05/11/19  0433   WBC 17.74* 13.88* 23.15*   HGB 7.5* 5.8* 7.2*   HCT 24.5* 19.1* 23.8*   * 402* 399*       Significant Imaging:  CT: I have reviewed all results within the past 24 hours and my personal findings are:  significant bilateral pulmonary edema                  Assessment/Plan:     * Ureteral transection of left native kidney  Mariann Huff is a 59 y.o. female s/p left ureteral injury on 4/12, presented with fever, AMS, and intraabdominal abscess, taken for washout and ligation of left ureter with neph tube placement on 4/21, Trach/PEG 5/2.    - tube feeds per SICU  - Zosyn, Vancomycin, Rifampin - wean per SICU   - BAL on 5/8 showed no organisms   - febrile overnight, WBC up to 23 this AM  - Maintain neph tube, and MADHURI drain, Fontenot  removed  - Urine output adequate   - H/H dropped yesterday; transfusions per SICU (2 units overnight)  - heparin gtt held for rectal bleeding, wean per SICU  - diuresis/fluids per SICU    Dispo: continue ICU care      Sepsis  As above        VTE Risk Mitigation (From admission, onward)        Ordered     IP VTE LOW RISK PATIENT  Once      05/08/19 1315     Place sequential compression device  Until discontinued      04/20/19 4039          Hima Neumann MD  Urology  [facility]

## 2019-05-11 NOTE — ASSESSMENT & PLAN NOTE
60 y/o female with HTN, DMII, CAD, NSTEMI, s/p L5-S1 OLIF with NSGY 4/12 c/b intraoperative left ureteral injury and underwent ureteroureteral anastomoses and ureteral stent placement. She was discharged with a correa and MADHURI drain that was removed on 4/16. She was seen by urology and anticipated stent removal in 2-3 months (6/2019).  She presents to ED with AMS and E.coli septicemia found to have air and fluid collection of left anterior prevertebral soft tissue with left ureter involvement. She underwent ex-lap and washout on 4/21. We are consulted for abx recommendations.      Per op note,  There were pocket of purulence noted and evacuated, sent for culture of left retroperitoneum to pole of left kidney. The stent was visible. Posterior to the stent there was a pocket of purulent fluid and exposed hardware along the spinal vertebrae. The tissue along the distal and proximal end of ureter were friable and unhealthy. She underwent left nephrostomy tube placement. The stent was removed and several metal clips were placed on proximal end of the ureteral.      OR cultures with Staph Lugdenensis - not currently covered.  Patient does appear to be having diarrhea - C diff negative but suspect may need restesting if WBC increases and no other source found.  QTc long at 480      Repeat BCXs sent and NGTD.  BAL done but no WBC no organisms seen - NGTD.  Source of fever and leukocytosis unclear but ABD suspected though could be non infectious - drug reaction vs PE/DVT.  CT abdomen with superficial fluid collection - possible seroma and inflammatory change around ureter.  C diff repeated and negative.  Line changes jonathan cvc - removed.  RUE US shows partially occlusive thrombus in RIJ and proximal subclvian vein - suspected cause of fevers.  CXR clear last time seen by ID.    She has developed fever and leukocytosis but appears not septic.  The patient denies any recent fever, chills, sweats, diarrhea, abdominal pain, pain, or  dysuria.  She is still bleeding from rectum per nurse. CXR with interval increased bilateral diffuse interstitial coarsening with patchy opacities in a perihilar and basilar distribution suggesting worsening pulmonary edema/CHF pattern, with sequela of superimposed aspiration or pneumonia not excluded though patient denies SOB and BAL cultures are NG.      There could be multiple causes to fever and leukocytosis:  1. Suspect may have a DVT in LUE given new arm edema - rec US  2. If US negative then may need to repeat CT ABD to assess prior fluid collection and if present then aspiration and culture is reasonable.  3. Patient is bleeding from rectum and may need CRSx consult as blood pooling and breaking down in rectum could surely produce a fever and leukocytosis - as well patient has been anemic to higher source of bleeding could be present and would need further exploration.  4.  She has had recent abdominal sx  and could have a PE as she recently had hypotension and SOB requiring ventilation support - this avenue had been explored previously and CTA done but was non diagnostic as timing of contrast bolus was not done correctly - may need to repeat if no other source found.     Plan  -Recommend discontinuing Vanc and Zosyn.  And starting pt on Cefazolin for known E. Coli and Staph Lugdenesis  -Continue Rifampin.  -Recommend working up other causes of white count as outline above.  If no improvement, would recommend CT abdomen/pelvis.  -ID Will follow

## 2019-05-11 NOTE — SUBJECTIVE & OBJECTIVE
"Interval HPI:   Overnight events: Remains in SICU. NAEON. BG within goal ranges on scheduled Novolog and SQ correction scale.   Eating:   NPO  Nausea: No  Hypoglycemia and intervention: No  Fever: Yes- low-grade (99.3)   TPN and/or TF: Yes  If yes, type of TF/TPN and rate: Peptamen @ 40 ml/hr    BP (!) 94/42   Pulse 86   Temp 99.3 °F (37.4 °C) (Oral)   Resp (!) 24   Ht 5' 5" (1.651 m)   Wt 76.9 kg (169 lb 8.5 oz)   SpO2 100%   Breastfeeding? No   BMI 28.21 kg/m²     Labs Reviewed and Include    Recent Labs   Lab 05/11/19  0433   *   CALCIUM 8.5*   ALBUMIN 1.5*   PROT 5.8*      K 4.3   CO2 26      BUN 31*   CREATININE 1.2   ALKPHOS 102   ALT 13   AST 20   BILITOT 1.4*     Lab Results   Component Value Date    WBC 23.15 (H) 05/11/2019    HGB 7.2 (L) 05/11/2019    HCT 23.8 (L) 05/11/2019    MCV 94 05/11/2019     (H) 05/11/2019     No results for input(s): TSH, FREET4 in the last 168 hours.  Lab Results   Component Value Date    HGBA1C 10.8 (H) 02/19/2019       Nutritional status:   Body mass index is 28.21 kg/m².  Lab Results   Component Value Date    ALBUMIN 1.5 (L) 05/11/2019    ALBUMIN 1.8 (L) 05/10/2019    ALBUMIN 1.8 (L) 05/09/2019     No results found for: PREALBUMIN    Estimated Creatinine Clearance: 52.4 mL/min (based on SCr of 1.2 mg/dL).    Accu-Checks  Recent Labs     05/09/19  0423 05/09/19  1205 05/09/19  1751 05/09/19  2339 05/10/19  0527 05/10/19  1221 05/10/19  1616 05/10/19  2050 05/10/19  2333 05/11/19  0427   POCTGLUCOSE 201* 163* 156* 181* 189* 219* 228* 196* 159* 178*       Current Medications and/or Treatments Impacting Glycemic Control  Immunotherapy:    Immunosuppressants     None        Steroids:   Hormones (From admission, onward)    None        Pressors:    Autonomic Drugs (From admission, onward)    None        Hyperglycemia/Diabetes Medications:   Antihyperglycemics (From admission, onward)    Start     Stop Route Frequency Ordered    05/11/19 1200  insulin " aspart U-100 pen 3 Units      -- SubQ Every 24 hours (non-standard times) 05/10/19 1351    05/11/19 0800  insulin aspart U-100 pen 3 Units      -- SubQ Every 24 hours (non-standard times) 05/10/19 1351    05/11/19 0400  insulin aspart U-100 pen 3 Units      -- SubQ Every 24 hours (non-standard times) 05/10/19 1351 05/11/19 0000  insulin aspart U-100 pen 3 Units      -- SubQ Every 24 hours (non-standard times) 05/10/19 1351    05/10/19 2000  insulin aspart U-100 pen 3 Units      -- SubQ Every 24 hours (non-standard times) 05/10/19 1351    05/10/19 1600  insulin aspart U-100 pen 3 Units      -- SubQ Every 24 hours (non-standard times) 05/10/19 1351    05/10/19 1450  insulin aspart U-100 pen 0-5 Units      -- SubQ Every 4 hours PRN 05/10/19 1351

## 2019-05-11 NOTE — PROGRESS NOTES
Pharmacokinetic Assessment Follow Up: IV Vancomycin    Vancomycin random subtherapeutic at 11.6 mcg/mL this AM after patient received one time 500 mg dose. RF improving; Scr trending down; UOP appears adequate    Vancomycin Regimen Plan:  1.  Initiate vancomycin conservatively at 750 mg Q24H given improving renal function   2.  Obtain trough concentration prior to next dose to ensure clearance:  5/12 @ 1030  3.  Continue to monitor RF closely and make adjustments as needed     Pharmacy will continue to follow and monitor vancomycin.    Thank you for the consult,   Tahmina Lema, PharmD, [facility]  [id number]     Patient brief summary:  Mariann Huff is a 59 y.o. female initiated on antimicrobial therapy with IV Vancomycin for treatment of suspected lower respiratory infection    Drug Allergies:   Review of patient's allergies indicates:  No Known Allergies    Actual Body Weight:   76.9 kg    Renal Function:   Estimated Creatinine Clearance: 52.4 mL/min (based on SCr of 1.2 mg/dL).,       CBC (last 72 hours):  Recent Labs   Lab Result Units 05/08/19  0936 05/08/19  1205 05/08/19  1844 05/09/19  0033 05/09/19  0428 05/09/19  1201 05/09/19  2321 05/10/19  1125 05/10/19  2334 05/11/19  0433   WBC K/uL 17.09* 15.01*  --  14.29* 12.54 13.28* 14.75* 17.74* 13.88* 23.15*   Hemoglobin g/dL 9.0* 8.1* 7.7* 7.2* 7.0* 7.4* 7.5* 7.5* 5.8* 7.2*   Hematocrit % 28.7* 26.6* 24.7* 23.4* 22.7* 24.2* 24.4* 24.5* 19.1* 23.8*   Platelets K/uL 606* 526*  --  528* 492* 493* 464* 488* 402* 399*   Gran% % 82.2* 78.7*  --  75.3* 75.2* 73.0 74.9* 74.8* 75.1* 81.8*   Lymph% % 6.6* 8.0*  --  9.6* 10.0* 10.0* 9.2* 9.4* 9.5* 6.3*   Mono% % 6.5 7.9  --  8.5 7.8 9.0 8.1 8.7 8.7 6.0   Eosinophil% % 3.0 3.8  --  4.8 5.1 6.3 6.0 5.2 5.0 4.4   Basophil% % 0.5 0.5  --  0.5 0.6 0.6 0.4 0.5 0.3 0.4   Differential Method  Automated Automated  --  Automated Automated Automated Automated Automated Automated Automated       Metabolic Panel (last 72 hours):  Recent  Labs   Lab Result Units 05/08/19  0936 05/09/19  0426 05/10/19  0330 05/11/19  0433   Sodium mmol/L 139 139 140 143   Potassium mmol/L 4.1 5.1 4.5 4.3   Chloride mmol/L 104 105 105 109   CO2 mmol/L 25 27 23 26   Glucose mg/dL 184* 183* 184* 168*   BUN, Bld mg/dL 31* 31* 29* 31*   Creatinine mg/dL 1.1 1.4 1.3 1.2   Albumin g/dL 1.8* 1.8* 1.8* 1.5*   Total Bilirubin mg/dL 0.8 1.0 0.8 1.4*   Alkaline Phosphatase U/L 97 85 119 102   AST U/L 29 38 36 20   ALT U/L 20 17 16 13   Magnesium mg/dL 1.8 2.5 2.1 2.2   Phosphorus mg/dL 3.9 3.9 3.4 3.6       Vancomycin Administrations:  vancomycin given in the last 96 hours                   vancomycin (VANCOCIN) 500 mg in dextrose 5 % 100 mL IVPB (mg) 500 mg New Bag 05/10/19 1210    vancomycin in dextrose 5 % 1 gram/250 mL IVPB 1,000 mg (mg) 1,000 mg New Bag 05/08/19 2310     1,000 mg New Bag  1135                Drug levels (last 3 results):  Recent Labs   Lab Result Units 05/10/19  1015 05/11/19  0434   Vancomycin, Random ug/mL 14.2 11.6       Microbiologic Results:  Microbiology Results (last 7 days)     Procedure Component Value Units Date/Time    Blood culture [[id number]] Collected:  05/10/19 1020    Order Status:  Completed Specimen:  Blood from Peripheral, Upper Arm, Right Updated:  05/10/19 1715     Blood Culture, Routine No Growth to date    Blood culture [[id number]] Collected:  05/10/19 1015    Order Status:  Completed Specimen:  Blood from Peripheral, Hand, Right Updated:  05/10/19 1715     Blood Culture, Routine No Growth to date    Culture, Respiratory with Gram Stain [[id number]] Collected:  05/08/19 1300    Order Status:  Completed Specimen:  Respiratory from BAL, RLL Updated:  05/10/19 0927     Respiratory Culture No growth     Gram Stain (Respiratory) Rare WBC's     Gram Stain (Respiratory) No organisms seen    Culture, VAP (BAL) [[id number]] Collected:  05/08/19 1300    Order Status:  Canceled Specimen:  Bronchial Alveolar Lavage from BAL, RLL Updated:  05/08/19  1425    Aerobic culture [[id number]]  (Susceptibility) Collected:  04/21/19 0125    Order Status:  Completed Specimen:  Body Fluid from Abdomen Updated:  05/06/19 1532     Aerobic Bacterial Culture --     STAPHYLOCOCCUS LUGDUNENSIS  Rare      Narrative:       Retroperitoneal fluid    Clostridium difficile EIA [[id number]] Collected:  05/05/19 1132    Order Status:  Completed Specimen:  Stool Updated:  05/05/19 1535     C. diff Antigen Negative     C difficile Toxins A+B, EIA Negative     Comment: Testing not recommended for children <24 months old.       Narrative:       [id number]    Blood culture [[id number]] Collected:  04/30/19 0230    Order Status:  Completed Specimen:  Blood from Peripheral, Hand, Right Updated:  05/05/19 0612     Blood Culture, Routine No growth after 5 days.    Blood culture [[id number]] Collected:  04/30/19 0247    Order Status:  Completed Specimen:  Blood from Peripheral, Wrist, Right Updated:  05/05/19 0612     Blood Culture, Routine No growth after 5 days.

## 2019-05-11 NOTE — SUBJECTIVE & OBJECTIVE
Interval History:   Tmax 101.2 overnight  2 units PRBCs given overnight for drop in H&H  Several bloody bowel movements overnight (packed with quikclot)  Heparin gtt currently held    Review of Systems    Objective:     Temp:  [98.2 °F (36.8 °C)-101.2 °F (38.4 °C)] 99.3 °F (37.4 °C)  Pulse:  [] 86  Resp:  [9-45] 24  SpO2:  [99 %-100 %] 100 %  BP: (94)/(42) 94/42  Arterial Line BP: ()/(40-84) 100/76     Body mass index is 28.21 kg/m².      Bladder Scan Volume (mL): 27 mL (05/10/19 0846)  Post Void Cath Residual Output (mL): 27 mL (05/10/19 0846)    Drains     Drain                 Closed/Suction Drain 04/21/19 0300 LLQ 18 days         Nephrostomy 04/21/19 0629 Left 8 Fr. 18 days         Urethral Catheter 04/20/19 1711 Latex 16 Fr. 18 days         Gastrostomy/Enterostomy 05/02/19 1134 Percutaneous endoscopic gastrostomy (PEG) LUQ decompression;feeding 6 days                Physical Exam   Constitutional: She appears well-developed. No distress.   HENT:   Head: Normocephalic and atraumatic.   Neck: No JVD present.   Cardiovascular: Normal rate and regular rhythm.    Pulmonary/Chest:   On ventilator   Abdominal: Soft. She exhibits no distension. There is no rebound and no guarding.   Incision c/d/i   MADHURI drain with ss output  G-tube   Genitourinary:   Genitourinary Comments: Fontenot removed  Left neph tube with clear yellow urine   Neurological: She is alert.   Skin: Skin is warm and dry. She is not diaphoretic. No pallor.         Significant Labs:    BMP:  Recent Labs   Lab 05/09/19  0426 05/10/19  0330 05/11/19  0433    140 143   K 5.1 4.5 4.3    105 109   CO2 27 23 26   BUN 31* 29* 31*   CREATININE 1.4 1.3 1.2   CALCIUM 8.5* 8.5* 8.5*       CBC:   Recent Labs   Lab 05/10/19  1125 05/10/19  2334 05/11/19  0433   WBC 17.74* 13.88* 23.15*   HGB 7.5* 5.8* 7.2*   HCT 24.5* 19.1* 23.8*   * 402* 399*       Significant Imaging:  CT: I have reviewed all results within the past 24 hours and my  personal findings are:  significant bilateral pulmonary edema

## 2019-05-11 NOTE — PLAN OF CARE
Pt on Spont, 45%, & 5 PEEP. Tmax: 101.2. 2u RBC given overnight for low H&H & bloody BMs. Pt had 3 Bms overnight.  2 noted to look bloody & last noted to be brown. Surgicel packed in rectum for bleeding. TF @ goal of 40cc/hr. UOP adequate. Plan to remove Ruthy & place on trach collar this AM. Plan of care reviewed w/pt & spouse, all questions answered.

## 2019-05-12 LAB
ALBUMIN SERPL BCP-MCNC: 1.6 G/DL (ref 3.5–5.2)
ALP SERPL-CCNC: 91 U/L (ref 55–135)
ALT SERPL W/O P-5'-P-CCNC: 12 U/L (ref 10–44)
ANION GAP SERPL CALC-SCNC: 9 MMOL/L (ref 8–16)
AST SERPL-CCNC: 15 U/L (ref 10–40)
BASOPHILS # BLD AUTO: 0.07 K/UL (ref 0–0.2)
BASOPHILS # BLD AUTO: 0.08 K/UL (ref 0–0.2)
BASOPHILS NFR BLD: 0.5 % (ref 0–1.9)
BASOPHILS NFR BLD: 0.6 % (ref 0–1.9)
BILIRUB SERPL-MCNC: 0.7 MG/DL (ref 0.1–1)
BUN SERPL-MCNC: 32 MG/DL (ref 6–20)
CALCIUM SERPL-MCNC: 8.5 MG/DL (ref 8.7–10.5)
CHLORIDE SERPL-SCNC: 110 MMOL/L (ref 95–110)
CO2 SERPL-SCNC: 25 MMOL/L (ref 23–29)
CREAT SERPL-MCNC: 1.1 MG/DL (ref 0.5–1.4)
DIFFERENTIAL METHOD: ABNORMAL
DIFFERENTIAL METHOD: ABNORMAL
EOSINOPHIL # BLD AUTO: 1.1 K/UL (ref 0–0.5)
EOSINOPHIL # BLD AUTO: 1.1 K/UL (ref 0–0.5)
EOSINOPHIL NFR BLD: 7.7 % (ref 0–8)
EOSINOPHIL NFR BLD: 8.3 % (ref 0–8)
ERYTHROCYTE [DISTWIDTH] IN BLOOD BY AUTOMATED COUNT: 15.1 % (ref 11.5–14.5)
ERYTHROCYTE [DISTWIDTH] IN BLOOD BY AUTOMATED COUNT: 15.1 % (ref 11.5–14.5)
EST. GFR  (AFRICAN AMERICAN): >60 ML/MIN/1.73 M^2
EST. GFR  (NON AFRICAN AMERICAN): 55.5 ML/MIN/1.73 M^2
GLUCOSE SERPL-MCNC: 162 MG/DL (ref 70–110)
HCT VFR BLD AUTO: 26.4 % (ref 37–48.5)
HCT VFR BLD AUTO: 28.3 % (ref 37–48.5)
HGB BLD-MCNC: 8.3 G/DL (ref 12–16)
HGB BLD-MCNC: 9 G/DL (ref 12–16)
IMM GRANULOCYTES # BLD AUTO: 0.13 K/UL (ref 0–0.04)
IMM GRANULOCYTES # BLD AUTO: 0.14 K/UL (ref 0–0.04)
IMM GRANULOCYTES NFR BLD AUTO: 1 % (ref 0–0.5)
IMM GRANULOCYTES NFR BLD AUTO: 1 % (ref 0–0.5)
INR PPP: 1 (ref 0.8–1.2)
LYMPHOCYTES # BLD AUTO: 1.2 K/UL (ref 1–4.8)
LYMPHOCYTES # BLD AUTO: 1.3 K/UL (ref 1–4.8)
LYMPHOCYTES NFR BLD: 8.6 % (ref 18–48)
LYMPHOCYTES NFR BLD: 9.8 % (ref 18–48)
MAGNESIUM SERPL-MCNC: 2.2 MG/DL (ref 1.6–2.6)
MCH RBC QN AUTO: 28.4 PG (ref 27–31)
MCH RBC QN AUTO: 29.4 PG (ref 27–31)
MCHC RBC AUTO-ENTMCNC: 31.4 G/DL (ref 32–36)
MCHC RBC AUTO-ENTMCNC: 31.8 G/DL (ref 32–36)
MCV RBC AUTO: 90 FL (ref 82–98)
MCV RBC AUTO: 93 FL (ref 82–98)
MONOCYTES # BLD AUTO: 1 K/UL (ref 0.3–1)
MONOCYTES # BLD AUTO: 1.1 K/UL (ref 0.3–1)
MONOCYTES NFR BLD: 7.2 % (ref 4–15)
MONOCYTES NFR BLD: 7.7 % (ref 4–15)
NEUTROPHILS # BLD AUTO: 10 K/UL (ref 1.8–7.7)
NEUTROPHILS # BLD AUTO: 10.1 K/UL (ref 1.8–7.7)
NEUTROPHILS NFR BLD: 73.2 % (ref 38–73)
NEUTROPHILS NFR BLD: 74.4 % (ref 38–73)
NRBC BLD-RTO: 0 /100 WBC
NRBC BLD-RTO: 0 /100 WBC
PHOSPHATE SERPL-MCNC: 2.8 MG/DL (ref 2.7–4.5)
PLATELET # BLD AUTO: 341 K/UL (ref 150–350)
PLATELET # BLD AUTO: 381 K/UL (ref 150–350)
PMV BLD AUTO: 10 FL (ref 9.2–12.9)
PMV BLD AUTO: 10.1 FL (ref 9.2–12.9)
POCT GLUCOSE: 176 MG/DL (ref 70–110)
POCT GLUCOSE: 177 MG/DL (ref 70–110)
POCT GLUCOSE: 180 MG/DL (ref 70–110)
POCT GLUCOSE: 181 MG/DL (ref 70–110)
POCT GLUCOSE: 187 MG/DL (ref 70–110)
POCT GLUCOSE: 193 MG/DL (ref 70–110)
POCT GLUCOSE: 215 MG/DL (ref 70–110)
POTASSIUM SERPL-SCNC: 3.9 MMOL/L (ref 3.5–5.1)
PROT SERPL-MCNC: 5.9 G/DL (ref 6–8.4)
PROTHROMBIN TIME: 10.1 SEC (ref 9–12.5)
RBC # BLD AUTO: 2.92 M/UL (ref 4–5.4)
RBC # BLD AUTO: 3.06 M/UL (ref 4–5.4)
SODIUM SERPL-SCNC: 144 MMOL/L (ref 136–145)
WBC # BLD AUTO: 13.56 K/UL (ref 3.9–12.7)
WBC # BLD AUTO: 13.64 K/UL (ref 3.9–12.7)

## 2019-05-12 PROCEDURE — 25000003 PHARM REV CODE 250: Performed by: STUDENT IN AN ORGANIZED HEALTH CARE EDUCATION/TRAINING PROGRAM

## 2019-05-12 PROCEDURE — 36556 INSERT NON-TUNNEL CV CATH: CPT | Mod: GC,,, | Performed by: SURGERY

## 2019-05-12 PROCEDURE — C9113 INJ PANTOPRAZOLE SODIUM, VIA: HCPCS | Performed by: STUDENT IN AN ORGANIZED HEALTH CARE EDUCATION/TRAINING PROGRAM

## 2019-05-12 PROCEDURE — 85025 COMPLETE CBC W/AUTO DIFF WBC: CPT

## 2019-05-12 PROCEDURE — 36556 PR INSERT NON-TUNNEL CV CATH 5+ YRS OLD: ICD-10-PCS | Mod: GC,,, | Performed by: SURGERY

## 2019-05-12 PROCEDURE — 80053 COMPREHEN METABOLIC PANEL: CPT

## 2019-05-12 PROCEDURE — 99232 PR SUBSEQUENT HOSPITAL CARE,LEVL II: ICD-10-PCS | Mod: ,,, | Performed by: NURSE PRACTITIONER

## 2019-05-12 PROCEDURE — 20000000 HC ICU ROOM

## 2019-05-12 PROCEDURE — 63600175 PHARM REV CODE 636 W HCPCS: Performed by: SURGERY

## 2019-05-12 PROCEDURE — 99900035 HC TECH TIME PER 15 MIN (STAT)

## 2019-05-12 PROCEDURE — 63600175 PHARM REV CODE 636 W HCPCS: Performed by: STUDENT IN AN ORGANIZED HEALTH CARE EDUCATION/TRAINING PROGRAM

## 2019-05-12 PROCEDURE — 99291 PR CRITICAL CARE, E/M 30-74 MINUTES: ICD-10-PCS | Mod: 25,GC,, | Performed by: SURGERY

## 2019-05-12 PROCEDURE — 99291 CRITICAL CARE FIRST HOUR: CPT | Mod: 25,GC,, | Performed by: SURGERY

## 2019-05-12 PROCEDURE — 94770 HC EXHALED C02 TEST: CPT

## 2019-05-12 PROCEDURE — 83735 ASSAY OF MAGNESIUM: CPT

## 2019-05-12 PROCEDURE — 94761 N-INVAS EAR/PLS OXIMETRY MLT: CPT

## 2019-05-12 PROCEDURE — 85610 PROTHROMBIN TIME: CPT

## 2019-05-12 PROCEDURE — 99232 SBSQ HOSP IP/OBS MODERATE 35: CPT | Mod: ,,, | Performed by: NURSE PRACTITIONER

## 2019-05-12 PROCEDURE — 84100 ASSAY OF PHOSPHORUS: CPT

## 2019-05-12 PROCEDURE — 27000221 HC OXYGEN, UP TO 24 HOURS

## 2019-05-12 PROCEDURE — 99900026 HC AIRWAY MAINTENANCE (STAT)

## 2019-05-12 RX ORDER — HYDROXYZINE HYDROCHLORIDE 10 MG/5ML
10 SYRUP ORAL EVERY 4 HOURS PRN
Status: DISCONTINUED | OUTPATIENT
Start: 2019-05-12 | End: 2019-06-05 | Stop reason: HOSPADM

## 2019-05-12 RX ORDER — CEFAZOLIN SODIUM 1 G/3ML
2 INJECTION, POWDER, FOR SOLUTION INTRAMUSCULAR; INTRAVENOUS
Status: DISCONTINUED | OUTPATIENT
Start: 2019-05-12 | End: 2019-05-19

## 2019-05-12 RX ORDER — MIDAZOLAM HYDROCHLORIDE 1 MG/ML
0.5 INJECTION INTRAMUSCULAR; INTRAVENOUS ONCE
Status: COMPLETED | OUTPATIENT
Start: 2019-05-12 | End: 2019-05-12

## 2019-05-12 RX ORDER — MIDAZOLAM HYDROCHLORIDE 1 MG/ML
0.5 INJECTION INTRAMUSCULAR; INTRAVENOUS ONCE
Status: DISCONTINUED | OUTPATIENT
Start: 2019-05-12 | End: 2019-05-13

## 2019-05-12 RX ORDER — HEPARIN SODIUM,PORCINE/D5W 25000/250
12 INTRAVENOUS SOLUTION INTRAVENOUS CONTINUOUS
Status: DISCONTINUED | OUTPATIENT
Start: 2019-05-12 | End: 2019-05-14

## 2019-05-12 RX ADMIN — HEPARIN SODIUM 5000 UNITS: 5000 INJECTION, SOLUTION INTRAVENOUS; SUBCUTANEOUS at 06:05

## 2019-05-12 RX ADMIN — MICONAZOLE NITRATE: 20 OINTMENT TOPICAL at 08:05

## 2019-05-12 RX ADMIN — HYDROXYZINE HYDROCHLORIDE 10 MG: 10 SOLUTION ORAL at 08:05

## 2019-05-12 RX ADMIN — INSULIN ASPART 3 UNITS: 100 INJECTION, SOLUTION INTRAVENOUS; SUBCUTANEOUS at 12:05

## 2019-05-12 RX ADMIN — PANTOPRAZOLE SODIUM 40 MG: 40 INJECTION, POWDER, FOR SOLUTION INTRAVENOUS at 08:05

## 2019-05-12 RX ADMIN — ACETAMINOPHEN 650 MG: 325 TABLET ORAL at 02:05

## 2019-05-12 RX ADMIN — INSULIN ASPART 2 UNITS: 100 INJECTION, SOLUTION INTRAVENOUS; SUBCUTANEOUS at 04:05

## 2019-05-12 RX ADMIN — CEFAZOLIN 2 G: 1 INJECTION, POWDER, FOR SOLUTION INTRAMUSCULAR; INTRAVENOUS at 10:05

## 2019-05-12 RX ADMIN — INSULIN ASPART 2 UNITS: 100 INJECTION, SOLUTION INTRAVENOUS; SUBCUTANEOUS at 09:05

## 2019-05-12 RX ADMIN — RIFAMPIN 300 MG: 600 INJECTION, POWDER, LYOPHILIZED, FOR SOLUTION INTRAVENOUS at 02:05

## 2019-05-12 RX ADMIN — HYDRALAZINE HYDROCHLORIDE 10 MG: 20 INJECTION INTRAMUSCULAR; INTRAVENOUS at 01:05

## 2019-05-12 RX ADMIN — INSULIN ASPART 3 UNITS: 100 INJECTION, SOLUTION INTRAVENOUS; SUBCUTANEOUS at 08:05

## 2019-05-12 RX ADMIN — CEFAZOLIN 2 G: 1 INJECTION, POWDER, FOR SOLUTION INTRAMUSCULAR; INTRAVENOUS at 06:05

## 2019-05-12 RX ADMIN — CHLORHEXIDINE GLUCONATE 0.12% ORAL RINSE 15 ML: 1.2 LIQUID ORAL at 08:05

## 2019-05-12 RX ADMIN — MIDAZOLAM HYDROCHLORIDE 0.5 MG: 1 INJECTION, SOLUTION INTRAMUSCULAR; INTRAVENOUS at 12:05

## 2019-05-12 RX ADMIN — INSULIN ASPART 3 UNITS: 100 INJECTION, SOLUTION INTRAVENOUS; SUBCUTANEOUS at 04:05

## 2019-05-12 RX ADMIN — PIPERACILLIN AND TAZOBACTAM 4.5 G: 4; .5 INJECTION, POWDER, LYOPHILIZED, FOR SOLUTION INTRAVENOUS; PARENTERAL at 08:05

## 2019-05-12 RX ADMIN — RIFAMPIN 300 MG: 600 INJECTION, POWDER, LYOPHILIZED, FOR SOLUTION INTRAVENOUS at 04:05

## 2019-05-12 RX ADMIN — PIPERACILLIN AND TAZOBACTAM 4.5 G: 4; .5 INJECTION, POWDER, LYOPHILIZED, FOR SOLUTION INTRAVENOUS; PARENTERAL at 01:05

## 2019-05-12 RX ADMIN — INSULIN ASPART 3 UNITS: 100 INJECTION, SOLUTION INTRAVENOUS; SUBCUTANEOUS at 11:05

## 2019-05-12 NOTE — PLAN OF CARE
Pt found to have DVT in upper extremity likely cause of F and white count.  Recommend to continue plan as laid out below for known E. Coli and Staph Lugdenesis      Recommendations:  1. Continue cefazolin and rifampin 300 mg q12hr IV (transition RIF to oral once able) given hardware in place.   2. Anticipate 6 weeks  from first negative blood cultures (4/23-6/4/19)  followed by oral abx suppression given retained hardware.   3. Weekly ESR, CRP,CBC, CMP faxed to ID dept at [phone]  4. FU 10-14d post DC  5.  Consider reimaging of abd in future if clinically appropriate given prior CT findings     6. ID will sign off.

## 2019-05-12 NOTE — SUBJECTIVE & OBJECTIVE
Interval History:   Tmax 99.8 overnight  No bloody BMs overnight  Heparin gtt currently held    Review of Systems    Objective:     Temp:  [97.7 °F (36.5 °C)-99.8 °F (37.7 °C)] 98.2 °F (36.8 °C)  Pulse:  [] 92  Resp:  [14-44] 25  SpO2:  [100 %] 100 %  BP: (107-170)/(55-90) 161/74  Arterial Line BP: (113-166)/(51-97) 138/51     Body mass index is 28.21 kg/m².    Date 05/12/19 0700 - 05/13/19 0659   Shift 0560-2565 1752-2559 1433-7482 24 Hour Total   INTAKE   NG/GT 80   80   Shift Total(mL/kg) 80(1)   80(1)   OUTPUT   Urine(mL/kg/hr) 100   100   Drains 20   20   Shift Total(mL/kg) 120(1.6)   120(1.6)   Weight (kg) 76.9 76.9 76.9 76.9     Bladder Scan Volume (mL): 27 mL (05/10/19 0846)  Post Void Cath Residual Output (mL): 27 mL (05/10/19 0846)    Drains     Drain                 Closed/Suction Drain 04/21/19 0300 LLQ 18 days         Nephrostomy 04/21/19 0629 Left 8 Fr. 18 days         Urethral Catheter 04/20/19 1711 Latex 16 Fr. 18 days         Gastrostomy/Enterostomy 05/02/19 1134 Percutaneous endoscopic gastrostomy (PEG) LUQ decompression;feeding 6 days                Physical Exam   Constitutional: She appears well-developed. No distress.   HENT:   Head: Normocephalic and atraumatic.   Neck: No JVD present.   Cardiovascular: Normal rate and regular rhythm.    Pulmonary/Chest:   On ventilator   Abdominal: Soft. She exhibits no distension. There is no rebound and no guarding.   Incision c/d/i   MADHURI drain with ss output  G-tube   Genitourinary:   Genitourinary Comments: Fontenot removed  Left neph tube with clear yellow urine   Neurological: She is alert.   Skin: Skin is warm and dry. She is not diaphoretic. No pallor.         Significant Labs:    BMP:  Recent Labs   Lab 05/10/19  0330 05/11/19  0433 05/12/19  0359    143 144   K 4.5 4.3 3.9    109 110   CO2 23 26 25   BUN 29* 31* 32*   CREATININE 1.3 1.2 1.1   CALCIUM 8.5* 8.5* 8.5*       CBC:   Recent Labs   Lab 05/11/19  0901 05/11/19  0867  05/12/19  0022   WBC 19.39* 15.51* 13.64*   HGB 8.6* 8.9* 8.3*   HCT 27.2* 27.2* 26.4*   * 372* 341       Significant Imaging:  CT: I have reviewed all results within the past 24 hours and my personal findings are:  significant bilateral pulmonary edema

## 2019-05-12 NOTE — ASSESSMENT & PLAN NOTE
Ms. Huff is a 58 yo F with PMH of HTN, DM II, CAD, NSTEMI, s/p L5-S1 OLIF with NSGY 4/12 c/b intraoperative left ureteral injury and underwent ureteroureteral anastomoses and ureteral stent placement complicated by sepsis due to E.coli septicemia now s/p ex lap on 4/21 and PEG/Trach of the vent now having BRBPR.     Passing brown stool at time of my exam, quik clot placed  Start heparin GTT, keep type and screen active  Pack rectum with quikclot/surgicel as needed  If continues to bleed despite local packing will consider EUA  Will follow

## 2019-05-12 NOTE — EICU
1227 Called for Timeout for Central line insertion per Dr. Zoë Du under the supervision of Dr. David Sagastume. Consents obtained. Time ouit completed. All personnel identified. All personal with hands, mask and MD's with sterile gown 1228 Versed 0.5 mg IVP per Lisa Álvarez RN. Left neck prepped then sterile drape applied.1237 Dr. Sagastume @ bedside with sterile gown/hat/mask on. Topical injected, then with guidance of US IJ located and inserted/ 1243 Versed 0.5mg per Lisa Álvarez RN. Central lined sutured down. 1250 Versed 0.5 mg IVP per Lisa Álvarez RN. Site dressed. CXR ordered.

## 2019-05-12 NOTE — ASSESSMENT & PLAN NOTE
Elizaamanda Huff is a 59 y.o. female s/p left ureteral injury on 4/12, presented with fever, AMS, and intraabdominal abscess, taken for washout and ligation of left ureter with neph tube placement on 4/21, Trach/PEG 5/2.    - Tube feeds per SICU  - Zosyn, Vancomycin, Rifampin - ID recs de-escalating to ancef/ rifampin today   - BAL on 5/8 showed no organisms   - afebrile overnight, WBC down to 13  - Maintain neph tube, and MADHURI drain, Fontenot removed  - Urine output adequate   - H/H stable s/p 2 units PRBCs yesterday  - heparin gtt held for rectal bleeding, per SICU  - diuresis/fluids per SICU    Dispo: continue ICU care

## 2019-05-12 NOTE — SUBJECTIVE & OBJECTIVE
"Interval HPI:   Overnight events: Remains in SICU. NAEON. BG within goal ranges on scheduled Novolog and SQ correction scale.   Eating:   NPO  Nausea: No  Hypoglycemia and intervention: No  Fever: No  TPN and/or TF: Yes  If yes, type of TF/TPN and rate: Peptamen @ 40 ml/hr    BP (!) 161/80   Pulse 92   Temp 98.2 °F (36.8 °C) (Oral)   Resp 19   Ht 5' 5" (1.651 m)   Wt 76.9 kg (169 lb 8.5 oz)   SpO2 100%   Breastfeeding? No   BMI 28.21 kg/m²     Labs Reviewed and Include    Recent Labs   Lab 05/12/19  0359   *   CALCIUM 8.5*   ALBUMIN 1.6*   PROT 5.9*      K 3.9   CO2 25      BUN 32*   CREATININE 1.1   ALKPHOS 91   ALT 12   AST 15   BILITOT 0.7     Lab Results   Component Value Date    WBC 13.64 (H) 05/12/2019    HGB 8.3 (L) 05/12/2019    HCT 26.4 (L) 05/12/2019    MCV 90 05/12/2019     05/12/2019     No results for input(s): TSH, FREET4 in the last 168 hours.  Lab Results   Component Value Date    HGBA1C 10.8 (H) 02/19/2019       Nutritional status:   Body mass index is 28.21 kg/m².  Lab Results   Component Value Date    ALBUMIN 1.6 (L) 05/12/2019    ALBUMIN 1.5 (L) 05/11/2019    ALBUMIN 1.8 (L) 05/10/2019     No results found for: PREALBUMIN    Estimated Creatinine Clearance: 57.2 mL/min (based on SCr of 1.1 mg/dL).    Accu-Checks  Recent Labs     05/10/19  1616 05/10/19  2050 05/10/19  2333 05/11/19  0427 05/11/19  0836 05/11/19  1153 05/11/19  1611 05/11/19  2053 05/12/19  0013 05/12/19  0428   POCTGLUCOSE 228* 196* 159* 178* 190* 169* 185* 174* 181* 176*       Current Medications and/or Treatments Impacting Glycemic Control  Immunotherapy:    Immunosuppressants     None        Steroids:   Hormones (From admission, onward)    None        Pressors:    Autonomic Drugs (From admission, onward)    None        Hyperglycemia/Diabetes Medications:   Antihyperglycemics (From admission, onward)    Start     Stop Route Frequency Ordered    05/11/19 1200  insulin aspart U-100 pen 3 Units   "    -- SubQ Every 24 hours (non-standard times) 05/10/19 1351    05/11/19 0800  insulin aspart U-100 pen 3 Units      -- SubQ Every 24 hours (non-standard times) 05/10/19 1351    05/11/19 0400  insulin aspart U-100 pen 3 Units      -- SubQ Every 24 hours (non-standard times) 05/10/19 1351    05/11/19 0000  insulin aspart U-100 pen 3 Units      -- SubQ Every 24 hours (non-standard times) 05/10/19 1351    05/10/19 2000  insulin aspart U-100 pen 3 Units      -- SubQ Every 24 hours (non-standard times) 05/10/19 1351    05/10/19 1600  insulin aspart U-100 pen 3 Units      -- SubQ Every 24 hours (non-standard times) 05/10/19 1351    05/10/19 1450  insulin aspart U-100 pen 0-5 Units      -- SubQ Every 4 hours PRN 05/10/19 1351

## 2019-05-12 NOTE — PROGRESS NOTES
Pt placed on 35% ATC. Pt tolerating well and resting comfortably at this time. Will continue to monitor.

## 2019-05-12 NOTE — PROGRESS NOTES
[facility]  Critical Care - Surgery  Progress Note    Patient Name: Mariann Huff  MRN: [id number]  Admission Date: 4/20/2019  Hospital Length of Stay: 22 days  Code Status: Full Code  Attending Provider: Robin Boyd MD  Primary Care Provider: Jasbir Haney MD   Principal Problem: Ureteral transection of left native kidney    Subjective:     Hospital/ICU Course:  The patient has had 2 episodes of bradycardia during the day.  Early in the morning, the patient had difficulty breathing and a mucus plug was found in the inner cannula of the tracheostomy.  She developed bradycardia but never arrested.  She also developed hypotension.  This was followed subsequently after she was bagged (Ambu) with tachypnea and hypertension and tachycardia.  Oxygen saturations were in the low 90s, and the arterial blood gas at that time revealed a significant alveolar arterial gradient with adequate ventilation.  Chest x-ray, which I personally reviewed, revealed what appeared to be a significant fluid overload.  We did perform an echocardiogram urgently, which I personally was present and reviewed the results.  There is no evidence of heart failure.  EKG was reported as normal and there is no evidence that the patient had an acute myocardial event.  The chest x-ray did suggest a significant amount of pulmonary edema. There was also a concern, because the the history of upper body deep venous thrombosis, that this could be a pulmonary embolism.  A CT scan of the chest , which I have personally reviewed show bilateral infiltrates compatible with pulmonary edema of non cardiogenic origin.  It was not possible to rule out a pulmonary embolism.  The patient is anticoagulated currently.  Ultrasound of the lower extremities was done and they do not show any deep venous thrombosis.    The patient did receive some fluids, because of her history of acute kidney injury, which has resolved and because she was receiving IV  contrast.  She has maintained a good urinary output during the day.  She has mostly being hemodynamically stable and her tachycardia resolved.  She remains sedated and on the ventilator.  Follow-up chest x-ray, which I personally reviewed, showed resolving pulmonary edema. Her oxygenation and oxygen saturations improved and we were able to wean the patient FiO2 down to 50%.    Of note, I did perform a bronchoscopy, which failed to reveal any amount of pus or mucus plugging.  There was also no evidence of aspiration.  BAL was performed and was sent.    A 2nd episode of bradycardia was observed in the afternoon.  This responded to the use of atropine and 0.1 mg of epinephrine.  She did have a tachycardic response with this which subsided rapidly.  She remained otherwise hemodynamically stable.  Arterial blood gases, reveal now a PO2 of 300 with adequate pCO2 and pH.  Mild hyperventilation.  She remains comfortable.  I have asked Cardiology to re-evaluate this patient.  For now she remains off pressors.    Neurologically the patient has been intact. She is awake alert and oriented with a nonfocal exam.    Her abdomen remains soft.  She has been NPO for now.  She has not had any further episodes of lower GI bleed.    The patient is having a good urinary output BUN and creatinine have been grossly within normal limits with a slight elevation of BUN.  Electrolytes are being followed and replaced as necessary.    Currently no evidence of infection in this patient.  Empirically I started antibiotics, but will stop them in the next 24-48 hours.    I have had the chance to talk to the patient's  at length and in detail.  I have explained our findings, I will keep him up-to-date as to any progress in understanding the etiology of these episodes and will wait continuing to talk to him as often as necessary.      Interval History/Significant Events:  No bloody bm overnight. Only rifampin orange stool. Did well from pain  cotnrol perspecitve. UO 40 cc from nephrostomy tube. LUE swelling noted yesterday by ID, again noted today. Will order US to rule out DVT. ID recs dc vanc and zosyn and start ancef and continue rifampin.   Follow-up For: Procedure(s) (LRB):  CREATION, TRACHEOSTOMY (N/A)  BRONCHOSCOPY (N/A)  EGD, WITH PEG TUBE INSERTION    Post-Operative Day: 9 Days Post-Op    Objective:     Vital Signs (Most Recent):  Temp: 98.2 °F (36.8 °C) (05/12/19 0300)  Pulse: 92 (05/12/19 0705)  Resp: 19 (05/12/19 0705)  BP: (!) 161/80 (05/12/19 0705)  SpO2: 100 % (05/12/19 0705) Vital Signs (24h Range):  Temp:  [97.7 °F (36.5 °C)-99.8 °F (37.7 °C)] 98.2 °F (36.8 °C)  Pulse:  [] 92  Resp:  [14-44] 19  SpO2:  [100 %] 100 %  BP: (107-170)/(55-90) 161/80  Arterial Line BP: (113-166)/(51-97) 138/51     Weight: 76.9 kg (169 lb 8.5 oz)  Body mass index is 28.21 kg/m².      Intake/Output Summary (Last 24 hours) at 5/12/2019 0743  Last data filed at 5/12/2019 0600  Gross per 24 hour   Intake 1469 ml   Output 731 ml   Net 738 ml       Physical Exam   Constitutional: She appears well-developed and well-nourished.   HENT:   Head: Normocephalic and atraumatic.   Tracheostomy in place   Eyes: Right eye exhibits no discharge. Left eye exhibits no discharge.   Neck: Normal range of motion. Neck supple.   Cardiovascular: Normal rate and regular rhythm.   Pulmonary/Chest: Effort normal. No respiratory distress.   Abdominal:   Midline incision c/d/i.  MADHURI with scant serous drainage. PEG with no leak. Abdomen soft, non-distended.  Right femoral lines in place, dressing CDI.   Genitourinary:   Genitourinary Comments: Nephrostomy tube in place with watery dark yellow output.  Fontenot catheter+   Musculoskeletal: Normal range of motion.   Neurological:   Able to follow commands, denying pain.    Skin: Skin is warm and dry.   Vitals reviewed.      Vents:  Vent Mode: Spont (05/12/19 0705)  Ventilator Initiated: Yes (05/08/19 0630)  Set Rate: 20 bmp (05/09/19  0506)  Vt Set: 400 mL (05/09/19 0506)  Pressure Support: 0 cmH20 (04/29/19 1014)  PEEP/CPAP: 5 cmH20 (05/12/19 0705)  Oxygen Concentration (%): 40 (05/12/19 0705)  Peak Airway Pressure: 30 cmH2O (05/12/19 0705)  Plateau Pressure: 0 cmH20 (05/12/19 0705)  Total Ve: 8.84 mL (05/12/19 0705)  Negative Inspiratory Force (cm H2O): -18 (04/27/19 1306)  F/VT Ratio<105 (RSBI): (!) 58.64 (05/12/19 0705)    Lines/Drains/Airways     Drain                 Closed/Suction Drain 04/21/19 0300 LLQ 21 days         Nephrostomy 04/21/19 0629 Left 8 Fr. 21 days         Gastrostomy/Enterostomy 05/02/19 1134 Percutaneous endoscopic gastrostomy (PEG) LUQ decompression;feeding 9 days          Airway                 Surgical Airway 05/02/19 1107 Shiley Cuffed 9 days          Arterial Line                 Arterial Line 05/08/19 0743 Right Femoral 4 days          Peripheral Intravenous Line                 Midline Catheter Insertion/Assessment  - Single Lumen 05/07/19 1632 Left basilic vein (medial side of arm) 18g x 8cm 4 days         Peripheral IV - Single Lumen 05/10/19 1020 22 G Right Upper Arm 1 day                Significant Labs:    CBC/Anemia Profile:  Recent Labs   Lab 05/11/19  0901 05/11/19  1613 05/12/19  0022   WBC 19.39* 15.51* 13.64*   HGB 8.6* 8.9* 8.3*   HCT 27.2* 27.2* 26.4*   * 372* 341   MCV 91 91 90   RDW 15.2* 15.2* 15.1*        Chemistries:  Recent Labs   Lab 05/11/19  0433 05/12/19  0359    144   K 4.3 3.9    110   CO2 26 25   BUN 31* 32*   CREATININE 1.2 1.1   CALCIUM 8.5* 8.5*   ALBUMIN 1.5* 1.6*   PROT 5.8* 5.9*   BILITOT 1.4* 0.7   ALKPHOS 102 91   ALT 13 12   AST 20 15   MG 2.2 2.2   PHOS 3.6 2.8       Significant Imaging:  I have reviewed all pertinent imaging results/findings within the past 24 hours.    Assessment/Plan:     * Ureteral transection of left native kidney    ASSESSMENT/PLAN:   Mariann CROW Huff is a 59 y.o. female s/p left ureteral injury on 4/12, who presented with fever, AMS, and  intraabdominal abscess, taken for washout and ligation of left ureter with nephrostomy tube placement on 4/21.     Neuro:   - Sedation off.   - Pain control with fentanyl prn     Pulmonary:   - Trach in place, on spontaneous, wean as tolerated  -Recent CT angio showing consolidations,on zosyn for possible aspiration pneumonia.  - ABGs prn     Cardiac:   - HDS at this time.  Pt has been weaned off pressors.   - TTE ordered recently, EF 55%  - Follow up on LUE US for assymetric swelling 5/12/19    Renal:    - S/p transection of left ureter 4/12 and subsequent ligation and PCT nephrostomy tube placement with IR 4/21  - Monitoring BUN/Cr, acceptable today  - UOP 2 stable  - Monitor I/Os    ID:   - Blood cultures 4/12 +E. Coli; sensitivities pending. Repeat Bld Cx show NGTD.   - UCx from 4/20 growing E. Coli; sensitivities are now back. Repeat Cx pending.   - ID following for assistance with abx therapy, currently vanc zosyn given fevers  -Increased white count today likely from blood transfusion.  Afebrile overnight.  - ID recs ancef for known e coli and staph lugdunes. DC vanc and zosyn. Continue rifampin    Hem/Onc:   - H/H stable at this time; cont to monitor. 8.3 this am  - Unable to fully anticoagulate as she has had multiple bloody BM with full heparinization. Will atempt low dose heparin.     Endocrine:  - DM Type 2 at baseline    - Endocrine following for assistance with blood glucose control.     Fluids/Electrolytes/Nutrition/GI:   -TFs @ goal    PPx:  - DVT: Low dose heparin     DISPO:  - Continue SICU care             Critical care was time spent personally by me on the following activities: development of treatment plan with patient or surrogate and bedside caregivers, discussions with consultants, evaluation of patient's response to treatment, examination of patient, ordering and performing treatments and interventions, ordering and review of laboratory studies, ordering and review of radiographic studies,  pulse oximetry, re-evaluation of patient's condition.  This critical care time did not overlap with that of any other provider or involve time for any procedures.     Good Hansen MD  Critical Care - Surgery  [facility]

## 2019-05-12 NOTE — ASSESSMENT & PLAN NOTE
ASSESSMENT/PLAN:   Mariann Huff is a 59 y.o. female s/p left ureteral injury on 4/12, who presented with fever, AMS, and intraabdominal abscess, taken for washout and ligation of left ureter with nephrostomy tube placement on 4/21.     Neuro:   - Sedation off.   - Pain control with fentanyl prn     Pulmonary:   - Trach in place, on spontaneous, wean as tolerated  -Recent CT angio showing consolidations,on zosyn for possible aspiration pneumonia.  - ABGs prn     Cardiac:   - HDS at this time.  Pt has been weaned off pressors.   - TTE ordered recently, EF 55%  - Follow up on LUE US for assymetric swelling 5/12/19    Renal:    - S/p transection of left ureter 4/12 and subsequent ligation and PCT nephrostomy tube placement with IR 4/21  - Monitoring BUN/Cr, acceptable today  - UOP 2 stable  - Monitor I/Os    ID:   - Blood cultures 4/12 +E. Coli; sensitivities pending. Repeat Bld Cx show NGTD.   - UCx from 4/20 growing E. Coli; sensitivities are now back. Repeat Cx pending.   - ID following for assistance with abx therapy, currently vanc zosyn given fevers  -Increased white count today likely from blood transfusion.  Afebrile overnight.  - ID recs ancef for known e coli and staph lugdunes. DC vanc and zosyn. Continue rifampin    Hem/Onc:   - H/H stable at this time; cont to monitor. 8.3 this am    Endocrine:  - DM Type 2 at baseline    - Endocrine following for assistance with blood glucose control.     Fluids/Electrolytes/Nutrition/GI:   -TFs @ goal    PPx:  - DVT: Lovenox     DISPO:  - Continue SICU care

## 2019-05-12 NOTE — PROGRESS NOTES
Therapy with vancomcyin complete and/or consult discontinued by provider.  Pharmacy will sign off, please re-consult as needed.    Tahmina Lema, PharmD, BCPS  [phone]

## 2019-05-12 NOTE — PROGRESS NOTES
"[facility]  Endocrinology  Progress Note    Admit Date: 4/20/2019     Reason for Consult: Management of T2DM, Hyperglycemia     Surgical Procedure and Date:       04/21/2019:         1. Exploratory laparotomy        2. Ligation of left ureter        3. Removal of left JJ ureteral stent      Diabetes diagnosis year: ANDRE    Home Diabetes Medications:  ANDRE  Toujeo 50 BID  Invokana 300mg daily   Novolog 35 units AM, 45 units PM, 35 units PM    How often checking glucose at home? ANDRE   BG readings on regimen: ANDRE  Hypoglycemia on the regimen?  ANDRE  Missed doses on regimen?  ANDRE    Diabetes Complications include:     Hyperglycemia and Diabetic retinopathy     Complicating diabetes co morbidities:   History of MI and MURTAZA, CAD, HLD    HPI:   Patient is a 59 y.o. female with a diagnosis of HTN, HLN, DM type 2, nonproliferative diabetic renopathy, CAD, NSTEMI, and back pain who presents to the ED with a complaint of altered mental status on 04/20/2019. Is now s/p Exploratory laparotomy, Ligation of left ureter, and Removal of left JJ ureteral stent. Endocrinology consulted to manage DM2/Hyperglycemia.    Lab Results   Component Value Date    HGBA1C 10.8 (H) 02/19/2019       Interval HPI:   Overnight events: Remains in SICU. NAEON. BG within goal ranges on scheduled Novolog and SQ correction scale.   Eating:   NPO  Nausea: No  Hypoglycemia and intervention: No  Fever: No  TPN and/or TF: Yes  If yes, type of TF/TPN and rate: Peptamen @ 40 ml/hr    BP (!) 161/80   Pulse 92   Temp 98.2 °F (36.8 °C) (Oral)   Resp 19   Ht 5' 5" (1.651 m)   Wt 76.9 kg (169 lb 8.5 oz)   SpO2 100%   Breastfeeding? No   BMI 28.21 kg/m²      Labs Reviewed and Include    Recent Labs   Lab 05/12/19  0359   *   CALCIUM 8.5*   ALBUMIN 1.6*   PROT 5.9*      K 3.9   CO2 25      BUN 32*   CREATININE 1.1   ALKPHOS 91   ALT 12   AST 15   BILITOT 0.7     Lab Results   Component Value Date    WBC 13.64 (H) 05/12/2019 "    HGB 8.3 (L) 05/12/2019    HCT 26.4 (L) 05/12/2019    MCV 90 05/12/2019     05/12/2019     No results for input(s): TSH, FREET4 in the last 168 hours.  Lab Results   Component Value Date    HGBA1C 10.8 (H) 02/19/2019       Nutritional status:   Body mass index is 28.21 kg/m².  Lab Results   Component Value Date    ALBUMIN 1.6 (L) 05/12/2019    ALBUMIN 1.5 (L) 05/11/2019    ALBUMIN 1.8 (L) 05/10/2019     No results found for: PREALBUMIN    Estimated Creatinine Clearance: 57.2 mL/min (based on SCr of 1.1 mg/dL).    Accu-Checks  Recent Labs     05/10/19  1616 05/10/19  2050 05/10/19  2333 05/11/19  0427 05/11/19  0836 05/11/19  1153 05/11/19  1611 05/11/19  2053 05/12/19  0013 05/12/19  0428   POCTGLUCOSE 228* 196* 159* 178* 190* 169* 185* 174* 181* 176*       Current Medications and/or Treatments Impacting Glycemic Control  Immunotherapy:    Immunosuppressants     None        Steroids:   Hormones (From admission, onward)    None        Pressors:    Autonomic Drugs (From admission, onward)    None        Hyperglycemia/Diabetes Medications:   Antihyperglycemics (From admission, onward)    Start     Stop Route Frequency Ordered    05/11/19 1200  insulin aspart U-100 pen 3 Units      -- SubQ Every 24 hours (non-standard times) 05/10/19 1351    05/11/19 0800  insulin aspart U-100 pen 3 Units      -- SubQ Every 24 hours (non-standard times) 05/10/19 1351    05/11/19 0400  insulin aspart U-100 pen 3 Units      -- SubQ Every 24 hours (non-standard times) 05/10/19 1351    05/11/19 0000  insulin aspart U-100 pen 3 Units      -- SubQ Every 24 hours (non-standard times) 05/10/19 1351    05/10/19 2000  insulin aspart U-100 pen 3 Units      -- SubQ Every 24 hours (non-standard times) 05/10/19 1351    05/10/19 1600  insulin aspart U-100 pen 3 Units      -- SubQ Every 24 hours (non-standard times) 05/10/19 1351    05/10/19 1450  insulin aspart U-100 pen 0-5 Units      -- SubQ Every 4 hours PRN 05/10/19 1351           ASSESSMENT and PLAN    * Ureteral transection of left native kidney  Managed per primary team  Avoid hypoglycemia        Type 2 diabetes mellitus with diabetic peripheral angiopathy without gangrene  BG goal 140 - 180    Novolog 3 units q 4 hrs (wieght based dosing using 0.5 modifier)  Hold if TFs are stopped.  Low dose correction scale   BG monitoring q 4 hrs.     ** Please notify Endocrine for any change and/or advance in diet/TF**  ** Please call Endocrine for any BG related issues **    Discharge Recommendations:     TBD.             CAD (coronary artery disease)  Managed per primary team  Condition may cause insulin resistance       Sleep apnea  May affect BG readings if uncontrolled        PAPA (acute kidney injury)  Caution with insulin stacking        HLD (hyperlipidemia)  Managed per primary team  Condition may cause insulin resistance         On enteral nutrition  May cause BG excursions.           Jolanta Morales NP  Endocrinology  [facility]

## 2019-05-12 NOTE — PROCEDURES
"Mariann Huff is a 59 y.o. female patient.    Temp: 99 °F (37.2 °C) (05/12/19 1300)  Pulse: 84 (05/12/19 1330)  Resp: (!) 38 (05/12/19 1330)  BP: (!) 185/86 (05/12/19 1336)  SpO2: 100 % (05/12/19 1330)  Weight: 76.9 kg (169 lb 8.5 oz) (05/11/19 0500)  Height: 5' 5" (165.1 cm) (05/09/19 1230)       Central Line  Date/Time: 5/12/2019 1:13 PM  Location procedure was performed: [facility] SURGICAL ICU (SICU)  Performed by: Zoë Du MD  Supervising provider: David Sagastume  Consent Done: Yes  Time out: Immediately prior to procedure a "time out" was called to verify the correct patient, procedure, equipment, support staff and site/side marked as required.  Indications: med administration, hemodynamic monitoring and vascular access  Anesthesia: local infiltration and see MAR for details    Anesthesia:  Local Anesthetic: lidocaine 1% with epinephrine  Anesthetic total: 3 mL  Preparation: skin prepped with ChloraPrep  Skin prep agent dried: skin prep agent completely dried prior to procedure  Sterile barriers: all five maximum sterile barriers used - cap, mask, sterile gown, sterile gloves, and large sterile sheet  Hand hygiene: hand hygiene performed prior to central venous catheter insertion  Location details: left internal jugular  Catheter type: triple lumen  Catheter size: 12 Fr  Ultrasound guidance: yes  Vessel Caliber: medium, patent, compressibility normal  Needle advanced into vessel with real time Ultrasound guidance.  Guidewire confirmed in vessel.  Sterile sheath used.  Manometry: No   Number of attempts: 1  Assessment: placement verified by x-ray,  no pneumothorax on x-ray,  tip termination and successful placement  Complications: none  Specimens: No  Post-procedure: line sutured,  chlorhexidine patch,  sterile dressing applied and blood return through all ports  Complications: No          Zoë Du  5/12/2019  "

## 2019-05-12 NOTE — SUBJECTIVE & OBJECTIVE
"PTA Medications   Medication Sig    albuterol (PROVENTIL/VENTOLIN HFA) 90 mcg/actuation inhaler Inhale 2 puffs into the lungs every 6 (six) hours as needed for Wheezing.    amLODIPine (NORVASC) 10 MG tablet TAKE 1 TABLET (10 MG TOTAL) BY MOUTH ONCE DAILY.    aspirin 81 mg Tab Take 81 mg by mouth. 1 Tablet Oral Every day    atorvastatin (LIPITOR) 40 MG tablet TAKE 1 TABLET (40 MG TOTAL) BY MOUTH ONCE DAILY.    ACCU-CHEK EDIN PLUS METER [facility]     BD INSULIN PEN NEEDLE UF MINI 31 x 3/16 " Ndle USE 4 TIMES A DAY    blood sugar diagnostic (ACCU-CHEK EDIN PLUS TEST STRP) Strp TEST BLOOD SUGARS 4 TIMES DAILY    chlorthalidone (HYGROTEN) 50 MG Tab Take 1 tablet (50 mg total) by mouth once daily.    esomeprazole (NEXIUM) 40 MG capsule TAKE 1 CAPSULE (40 MG TOTAL) BY MOUTH BEFORE BREAKFAST.    fenofibrate micronized (LOFIBRA) 134 MG Cap TAKE 1 CAPSULE (134 MG TOTAL) BY MOUTH BEFORE BREAKFAST.    flash glucose scanning reader (FREESTYLE MISTI 14 DAY READER) Misc 1 each by Misc.(Non-Drug; Combo Route) route once daily.    flash glucose sensor (FREESTYLE MISTI 14 DAY SENSOR) Kit 1 each by Misc.(Non-Drug; Combo Route) route once daily.    gabapentin (NEURONTIN) 100 MG capsule Take 2 capsules (200 mg total) by mouth every evening.    insulin aspart U-100 (NOVOLOG FLEXPEN U-100 INSULIN) 100 unit/mL InPn pen INJECT 35 U AM, 45 U AT NOON, 35 U PM.150-200 +2, 201-250 +4, 251-300 +6, 301-350 +8, >350 +10    insulin glargine, TOUJEO, (TOUJEO SOLOSTAR U-300 INSULIN) 300 unit/mL (1.5 mL) InPn pen Inject 50 Units into the skin 2 (two) times daily.    INVOKANA 300 mg Tab tablet Take 1 tablet (300 mg total) by mouth once daily.    irbesartan (AVAPRO) 300 MG tablet Take 1 tablet (300 mg total) by mouth every evening.    lancets 30 gauge Misc     metoprolol succinate (TOPROL-XL) 100 MG 24 hr tablet TAKE 1 TABLET (100 MG TOTAL) BY MOUTH ONCE DAILY.    nitroGLYCERIN (NITROSTAT) 0.4 MG SL tablet Take one every 2-3 min till " "chestpain relief for 3 times and if still no relief, call MD or come to ED    omega-3 acid ethyl esters (LOVAZA) 1 gram capsule Take 1 g by mouth once daily.     pen needle, diabetic (BD ULTRA-FINE GRABIEL PEN NEEDLES) 32 gauge x 5/32" Ndle For use with insulin pens daily 4 times a day    prasugrel (EFFIENT) 10 mg Tab TAKE 1 TABLET (10 MG TOTAL) BY MOUTH ONCE DAILY.    tamsulosin (FLOMAX) 0.4 mg Cap Take 1 capsule (0.4 mg total) by mouth every evening.    tramadol (ULTRAM) 50 mg tablet Take 1 tablet (50 mg total) by mouth 3 (three) times daily as needed for Pain.    TRULICITY 1.5 mg/0.5 mL PnIj INJECT 1.5 MG INTO THE SKIN EVERY 7 DAYS.    zolpidem (AMBIEN) 5 MG Tab Take 1 tablet (5 mg total) by mouth nightly as needed.    [DISCONTINUED] cyclobenzaprine (FLEXERIL) 10 MG tablet TAKE 1 TABLET BY MOUTH 3 TIMES A DAY AS NEEDED FOR MUSCLE SPASMS. NO WORKING OR DRIVING ON THIS MED       Review of patient's allergies indicates:  No Known Allergies    Past Medical History:   Diagnosis Date    Anticoagulant long-term use     Asthma     Back pain     Bradycardia, unspecified 5/8/2019    The etiology of the bradycardic episode is unclear.  The have appear to be respiratory in origin (at least the 1st episode).  We will continue to monitor carefully.  We are awaiting evaluation by Cardiology.      CAD (coronary artery disease)     s/p stentimg 2003 (2),2009 (1)    Carotid artery stenosis     Diabetes mellitus type 2 in obese     HTN (hypertension), benign     Hyperlipidemia     Keloid cicatrix     NPDR (nonproliferative diabetic retinopathy) 8/17/2015    NSTEMI (non-ST elevated myocardial infarction)     Nuclear sclerosis - Right Eye 3/18/2014    Primary localized osteoarthrosis, lower leg 6/18/2014    Sleep apnea      Past Surgical History:   Procedure Laterality Date    BRONCHOSCOPY N/A 5/2/2019    Performed by Sean [facility]ano MD at [facility] OR [address] [place] [address]    CARDIAC CATHETERIZATION      cataract extraction " left eye      cataracts      CATHETERIZATION, HEART, LEFT Left 2014    Performed by Wilman Kim MD at [facility] CATH LAB     SECTION, LOW TRANSVERSE      CORONARY ANGIOPLASTY      Creation, Nephrostomy, Percutaneous Left 2019    Performed by Karina Surgeon at [facility] KARINA    CREATION, TRACHEOSTOMY N/A 2019    Performed by Sean Ruano MD at [facility] OR 2ND FLR    EGD, WITH PEG TUBE INSERTION  2019    Performed by Sean Ruano MD at [facility] OR 2ND FLR    ESOPHAGOGASTRODUODENOSCOPY (EGD) N/A 2016    Performed by Gardenia Adamson MD at [facility] ENDO (4TH FLR)    EXCISION TURBINATE, SUBMUCOUS      FUSION, SPINE, LUMBAR, ANTERIOR APPROACH Left L5-S1 Anterior to Psoa Interbody Fusion, L5-S1 Posterior Instrumentation Left 2019    Performed by Mk George MD at [facility] OR 2ND FLR    HAND SURGERY Left     HAND SURGERY Right     torn ligament repair/ Dr. Yeboah    HYSTERECTOMY      Injection,steroid,epidural,transforaminal approach - Bilateral - S1 Bilateral 2018    Performed by Tl Abreu MD at [facility] PAIN MGT    Injection-steroid-epidural-caudal N/A 2019    Performed by Dave Bentley Jr., MD at [facility] PAIN MGT    INSERTION-INTRAOCULAR LENS (IOL) Right 2015    Performed by Good Domingo MD at [facility] OR 1ST FLR    LAPAROTOMY, EXPLORATORY; LIGATION OF LEFT URETER; DOUBLE J STENT REMOVAL LEFT URETER Left 2019    Performed by Paul Carlson MD at [facility] OR 2ND FLR    left foot surgery      left wrist surgery      NASAL SEPTUM SURGERY  5/7/15    PHACOEMULSIFICATION-ASPIRATION-CATARACT Right 2015    Performed by Good Domingo MD at [facility] OR 1ST FLR    REPAIR, URETER  2019    Performed by Mk George MD at [facility] OR 2ND FLR    RESECTION-TURBINATES (SMR) N/A 2015    Performed by Dileep Dubois III, MD at [facility] OR 2ND FLR    rt elbow surgery      S/P LAD COATED STENT  2010    6 total     S/P OM1 STENT   08/2003    SEPTOPLASTY N/A 5/7/2015    Performed by Dileep Dubois III, MD at [facility] OR 2ND FLR    SINUS SURGERY      F.E.S.S.    SINUS SURGERY FUNCTIONAL ENDOSCOPIC WITH NAVIGATION WITH MAXILLARIES, ETHMOIDS, LEFT SPHENOID, LEFT LOLY N/A 5/7/2015    Performed by Dileep Dubois III, MD at [facility] OR 2ND FLR    STENT, URETERAL placement  4/12/2019    Performed by Robin Boyd MD at [facility] OR 2ND FLR    TUBAL LIGATION       Family History     Problem Relation (Age of Onset)    Diabetes Mother, Father, Sister, Brother, Sister    Heart attack Father    Heart disease Mother    Leukemia Father    No Known Problems Sister, Brother, Brother, Maternal Grandmother, Maternal Grandfather, Paternal Grandmother, Paternal Grandfather, Son, Son, Maternal Aunt, Maternal Uncle, Paternal Aunt, Paternal Uncle        Tobacco Use    Smoking status: Never Smoker    Smokeless tobacco: Never Used   Substance and Sexual Activity    Alcohol use: No     Alcohol/week: 0.0 oz    Drug use: No    Sexual activity: Yes     Partners: Male     Birth control/protection: Post-menopausal     Comment:      Review of Systems  Objective:     Vital Signs (Most Recent):  Temp: 98.9 °F (37.2 °C) (05/12/19 0700)  Pulse: 88 (05/12/19 1000)  Resp: 17 (05/12/19 1000)  BP: (!) 156/76 (05/12/19 0900)  SpO2: 100 % (05/12/19 1000) Vital Signs (24h Range):  Temp:  [97.7 °F (36.5 °C)-99.8 °F (37.7 °C)] 98.9 °F (37.2 °C)  Pulse:  [] 88  Resp:  [15-44] 17  SpO2:  [100 %] 100 %  BP: (107-170)/(55-90) 156/76  Arterial Line BP: (124-166)/(51-97) 138/51     Weight: 76.9 kg (169 lb 8.5 oz)  Body mass index is 28.21 kg/m².    Physical Exam    Anorectal Exam:  Anal Skin: Normal    Digital Rectal Exam:  Resting Tone normal    Anoscopy:  Limited secondary to presence of brown stool  No large bleed appreciated  Quik clot placed    Significant Labs:  BMP (Last 3 Results):   Recent Labs   Lab 05/10/19  0330 05/11/19  0433 05/12/19  0359   * 168* 162*   NA  140 143 144   K 4.5 4.3 3.9    109 110   CO2 23 26 25   BUN 29* 31* 32*   CREATININE 1.3 1.2 1.1   CALCIUM 8.5* 8.5* 8.5*   MG 2.1 2.2 2.2     CBC (Last 3 Results):   Recent Labs   Lab 05/11/19  0901 05/11/19  1613 05/12/19  0022   WBC 19.39* 15.51* 13.64*   RBC 2.99* 2.99* 2.92*   HGB 8.6* 8.9* 8.3*   HCT 27.2* 27.2* 26.4*   * 372* 341   MCV 91 91 90   MCH 28.8 29.8 28.4   MCHC 31.6* 32.7 31.4*       Significant Diagnostics:  None

## 2019-05-12 NOTE — PROGRESS NOTES
[facility]  Urology  Progress Note    Patient Name: Mariann Huff  MRN: [id number]  Admission Date: 4/20/2019  Hospital Length of Stay: 22 days  Code Status: Full Code   Attending Provider: Robin Boyd MD   Primary Care Physician: Jasbir Haney MD    Subjective:     HPI:  Mariann Huff is a 59 y.o. female with history of HTN, type 2 diabetes mellitus, CAD, NSTEMI, and back pain who presents to [facility] with altered mental status and sepsis. She underwent L5-S1 OLIF with NSGY on 4/12 and had intraoperative left ureteral injury with ureteroureteral anastomosis and ureteral stent placement. She did well initially and had correa and MADHURI drain removed on 4/16. She began having chills and altered mental status 2 days ago and this has progressively worsened. No complaints of pain.     She is altered and HPI is limited. In the ED she is febrile to 103 and tachycardic and pressures are low normal. WBC is 5, creatinine is 3.6 baseline 1.0, lactate is 4.6. Cath UA concerning for infection, 3+ LE, >100 WBCs, and many bacteria on microscopy.    CT and MRI abdomen both show air with minimal fluid near the surgical site with left ureter coursing through. There is air throughout the proximal collecting system which is decompressed with JJ ureteral stent in good position.    Taken for ex lap, ligation of left ureter and left neph tube placement on 4/21/19.    Interval History:   Tmax 99.8 overnight  No bloody BMs overnight  Heparin gtt currently held    Review of Systems    Objective:     Temp:  [97.7 °F (36.5 °C)-99.8 °F (37.7 °C)] 98.2 °F (36.8 °C)  Pulse:  [] 92  Resp:  [14-44] 25  SpO2:  [100 %] 100 %  BP: (107-170)/(55-90) 161/74  Arterial Line BP: (113-166)/(51-97) 138/51     Body mass index is 28.21 kg/m².    Date 05/12/19 0700 - 05/13/19 0659   Shift 2357-9227 5285-7644 6134-3536 24 Hour Total   INTAKE   NG/GT 80   80   Shift Total(mL/kg) 80(1)   80(1)   OUTPUT   Urine(mL/kg/hr) 100   100   Drains 20    20   Shift Total(mL/kg) 120(1.6)   120(1.6)   Weight (kg) 76.9 76.9 76.9 76.9     Bladder Scan Volume (mL): 27 mL (05/10/19 0846)  Post Void Cath Residual Output (mL): 27 mL (05/10/19 0846)    Drains     Drain                 Closed/Suction Drain 04/21/19 0300 LLQ 18 days         Nephrostomy 04/21/19 0629 Left 8 Fr. 18 days         Urethral Catheter 04/20/19 1711 Latex 16 Fr. 18 days         Gastrostomy/Enterostomy 05/02/19 1134 Percutaneous endoscopic gastrostomy (PEG) LUQ decompression;feeding 6 days                Physical Exam   Constitutional: She appears well-developed. No distress.   HENT:   Head: Normocephalic and atraumatic.   Neck: No JVD present.   Cardiovascular: Normal rate and regular rhythm.    Pulmonary/Chest:   On ventilator   Abdominal: Soft. She exhibits no distension. There is no rebound and no guarding.   Incision c/d/i   MADHURI drain with ss output  G-tube   Genitourinary:   Genitourinary Comments: Fontenot removed  Left neph tube with clear yellow urine   Neurological: She is alert.   Skin: Skin is warm and dry. She is not diaphoretic. No pallor.         Significant Labs:    BMP:  Recent Labs   Lab 05/10/19  0330 05/11/19  0433 05/12/19  0359    143 144   K 4.5 4.3 3.9    109 110   CO2 23 26 25   BUN 29* 31* 32*   CREATININE 1.3 1.2 1.1   CALCIUM 8.5* 8.5* 8.5*       CBC:   Recent Labs   Lab 05/11/19  0901 05/11/19  1613 05/12/19  0022   WBC 19.39* 15.51* 13.64*   HGB 8.6* 8.9* 8.3*   HCT 27.2* 27.2* 26.4*   * 372* 341       Significant Imaging:  CT: I have reviewed all results within the past 24 hours and my personal findings are:  significant bilateral pulmonary edema                  Assessment/Plan:     * Ureteral transection of left native kidney  Elizaamanda Huff is a 59 y.o. female s/p left ureteral injury on 4/12, presented with fever, AMS, and intraabdominal abscess, taken for washout and ligation of left ureter with neph tube placement on 4/21, Trach/PEG 5/2.    - Tube  feeds per SICU  - Zosyn, Vancomycin, Rifampin - ID recs de-escalating to ancef/ rifampin today   - BAL on 5/8 showed no organisms   - afebrile overnight, WBC down to 13  - Maintain neph tube, and MADHURI drain, Fontenot removed  - Urine output adequate   - H/H stable s/p 2 units PRBCs yesterday  - heparin gtt held for rectal bleeding, per SICU  - diuresis/fluids per SICU    Dispo: continue ICU care      Sepsis  As above        VTE Risk Mitigation (From admission, onward)        Ordered     heparin (porcine) injection 5,000 Units  Every 8 hours      05/11/19 1129     IP VTE LOW RISK PATIENT  Once      05/08/19 1315     Place sequential compression device  Until discontinued      04/20/19 4172          Hima Neumann MD  Urology  [facility]

## 2019-05-13 ENCOUNTER — ANESTHESIA (OUTPATIENT)
Dept: INTENSIVE CARE | Facility: HOSPITAL | Age: 59
DRG: 003 | End: 2019-05-13
Payer: MEDICARE

## 2019-05-13 ENCOUNTER — ANESTHESIA EVENT (OUTPATIENT)
Dept: INTENSIVE CARE | Facility: HOSPITAL | Age: 59
DRG: 003 | End: 2019-05-13
Payer: MEDICARE

## 2019-05-13 PROBLEM — T81.89XA DELAYED SURGICAL WOUND HEALING: Status: ACTIVE | Noted: 2019-05-13

## 2019-05-13 LAB
ABO + RH BLD: NORMAL
ALBUMIN SERPL BCP-MCNC: 1.7 G/DL (ref 3.5–5.2)
ALBUMIN SERPL BCP-MCNC: 1.7 G/DL (ref 3.5–5.2)
ALP SERPL-CCNC: 111 U/L (ref 55–135)
ALP SERPL-CCNC: 99 U/L (ref 55–135)
ALT SERPL W/O P-5'-P-CCNC: 10 U/L (ref 10–44)
ALT SERPL W/O P-5'-P-CCNC: 7 U/L (ref 10–44)
ANION GAP SERPL CALC-SCNC: 10 MMOL/L (ref 8–16)
ANION GAP SERPL CALC-SCNC: 6 MMOL/L (ref 8–16)
APTT BLDCRRT: 23.2 SEC (ref 21–32)
APTT BLDCRRT: <21 SEC (ref 21–32)
AST SERPL-CCNC: 16 U/L (ref 10–40)
AST SERPL-CCNC: 18 U/L (ref 10–40)
BACTERIA #/AREA URNS AUTO: ABNORMAL /HPF
BASOPHILS # BLD AUTO: 0.07 K/UL (ref 0–0.2)
BASOPHILS # BLD AUTO: 0.08 K/UL (ref 0–0.2)
BASOPHILS NFR BLD: 0.5 % (ref 0–1.9)
BILIRUB SERPL-MCNC: 0.6 MG/DL (ref 0.1–1)
BILIRUB SERPL-MCNC: 0.7 MG/DL (ref 0.1–1)
BILIRUB UR QL STRIP: NEGATIVE
BLD GP AB SCN CELLS X3 SERPL QL: NORMAL
BUN SERPL-MCNC: 40 MG/DL (ref 6–20)
BUN SERPL-MCNC: 42 MG/DL (ref 6–20)
CALCIUM SERPL-MCNC: 8.7 MG/DL (ref 8.7–10.5)
CALCIUM SERPL-MCNC: 8.9 MG/DL (ref 8.7–10.5)
CHLORIDE SERPL-SCNC: 108 MMOL/L (ref 95–110)
CHLORIDE SERPL-SCNC: 110 MMOL/L (ref 95–110)
CLARITY UR REFRACT.AUTO: ABNORMAL
CO2 SERPL-SCNC: 24 MMOL/L (ref 23–29)
CO2 SERPL-SCNC: 25 MMOL/L (ref 23–29)
COLOR UR AUTO: YELLOW
CREAT SERPL-MCNC: 1.5 MG/DL (ref 0.5–1.4)
CREAT SERPL-MCNC: 1.5 MG/DL (ref 0.5–1.4)
DIFFERENTIAL METHOD: ABNORMAL
EOSINOPHIL # BLD AUTO: 0.8 K/UL (ref 0–0.5)
EOSINOPHIL # BLD AUTO: 0.8 K/UL (ref 0–0.5)
EOSINOPHIL # BLD AUTO: 0.9 K/UL (ref 0–0.5)
EOSINOPHIL # BLD AUTO: 0.9 K/UL (ref 0–0.5)
EOSINOPHIL NFR BLD: 5 % (ref 0–8)
EOSINOPHIL NFR BLD: 5 % (ref 0–8)
EOSINOPHIL NFR BLD: 5.5 % (ref 0–8)
EOSINOPHIL NFR BLD: 6.3 % (ref 0–8)
ERYTHROCYTE [DISTWIDTH] IN BLOOD BY AUTOMATED COUNT: 15.3 % (ref 11.5–14.5)
ERYTHROCYTE [DISTWIDTH] IN BLOOD BY AUTOMATED COUNT: 15.3 % (ref 11.5–14.5)
ERYTHROCYTE [DISTWIDTH] IN BLOOD BY AUTOMATED COUNT: 15.4 % (ref 11.5–14.5)
ERYTHROCYTE [DISTWIDTH] IN BLOOD BY AUTOMATED COUNT: 15.4 % (ref 11.5–14.5)
EST. GFR  (AFRICAN AMERICAN): 44 ML/MIN/1.73 M^2
EST. GFR  (AFRICAN AMERICAN): 44 ML/MIN/1.73 M^2
EST. GFR  (NON AFRICAN AMERICAN): 38.1 ML/MIN/1.73 M^2
EST. GFR  (NON AFRICAN AMERICAN): 38.1 ML/MIN/1.73 M^2
GLUCOSE SERPL-MCNC: 164 MG/DL (ref 70–110)
GLUCOSE SERPL-MCNC: 169 MG/DL (ref 70–110)
GLUCOSE UR QL STRIP: NEGATIVE
HCT VFR BLD AUTO: 25.8 % (ref 37–48.5)
HCT VFR BLD AUTO: 25.8 % (ref 37–48.5)
HCT VFR BLD AUTO: 26.6 % (ref 37–48.5)
HCT VFR BLD AUTO: 27.4 % (ref 37–48.5)
HGB BLD-MCNC: 7.9 G/DL (ref 12–16)
HGB BLD-MCNC: 7.9 G/DL (ref 12–16)
HGB BLD-MCNC: 8.3 G/DL (ref 12–16)
HGB BLD-MCNC: 8.4 G/DL (ref 12–16)
HGB UR QL STRIP: ABNORMAL
HYALINE CASTS UR QL AUTO: 0 /LPF
IMM GRANULOCYTES # BLD AUTO: 0.11 K/UL (ref 0–0.04)
IMM GRANULOCYTES # BLD AUTO: 0.11 K/UL (ref 0–0.04)
IMM GRANULOCYTES # BLD AUTO: 0.12 K/UL (ref 0–0.04)
IMM GRANULOCYTES # BLD AUTO: 0.14 K/UL (ref 0–0.04)
IMM GRANULOCYTES NFR BLD AUTO: 0.7 % (ref 0–0.5)
IMM GRANULOCYTES NFR BLD AUTO: 0.7 % (ref 0–0.5)
IMM GRANULOCYTES NFR BLD AUTO: 0.8 % (ref 0–0.5)
IMM GRANULOCYTES NFR BLD AUTO: 0.9 % (ref 0–0.5)
INR PPP: 0.9 (ref 0.8–1.2)
INR PPP: 1 (ref 0.8–1.2)
KETONES UR QL STRIP: NEGATIVE
LACTATE SERPL-SCNC: 0.8 MMOL/L (ref 0.5–2.2)
LEUKOCYTE ESTERASE UR QL STRIP: ABNORMAL
LYMPHOCYTES # BLD AUTO: 1.5 K/UL (ref 1–4.8)
LYMPHOCYTES # BLD AUTO: 1.6 K/UL (ref 1–4.8)
LYMPHOCYTES # BLD AUTO: 2 K/UL (ref 1–4.8)
LYMPHOCYTES # BLD AUTO: 2 K/UL (ref 1–4.8)
LYMPHOCYTES NFR BLD: 11.9 % (ref 18–48)
LYMPHOCYTES NFR BLD: 11.9 % (ref 18–48)
LYMPHOCYTES NFR BLD: 9.9 % (ref 18–48)
LYMPHOCYTES NFR BLD: 9.9 % (ref 18–48)
MAGNESIUM SERPL-MCNC: 2.2 MG/DL (ref 1.6–2.6)
MCH RBC QN AUTO: 28.9 PG (ref 27–31)
MCH RBC QN AUTO: 29 PG (ref 27–31)
MCHC RBC AUTO-ENTMCNC: 30.6 G/DL (ref 32–36)
MCHC RBC AUTO-ENTMCNC: 30.6 G/DL (ref 32–36)
MCHC RBC AUTO-ENTMCNC: 30.7 G/DL (ref 32–36)
MCHC RBC AUTO-ENTMCNC: 31.2 G/DL (ref 32–36)
MCV RBC AUTO: 93 FL (ref 82–98)
MCV RBC AUTO: 95 FL (ref 82–98)
MICROSCOPIC COMMENT: ABNORMAL
MONOCYTES # BLD AUTO: 1.2 K/UL (ref 0.3–1)
MONOCYTES # BLD AUTO: 1.3 K/UL (ref 0.3–1)
MONOCYTES # BLD AUTO: 1.3 K/UL (ref 0.3–1)
MONOCYTES # BLD AUTO: 1.4 K/UL (ref 0.3–1)
MONOCYTES NFR BLD: 7.6 % (ref 4–15)
MONOCYTES NFR BLD: 7.6 % (ref 4–15)
MONOCYTES NFR BLD: 8 % (ref 4–15)
MONOCYTES NFR BLD: 8.3 % (ref 4–15)
NEUTROPHILS # BLD AUTO: 11.1 K/UL (ref 1.8–7.7)
NEUTROPHILS # BLD AUTO: 12.3 K/UL (ref 1.8–7.7)
NEUTROPHILS # BLD AUTO: 12.4 K/UL (ref 1.8–7.7)
NEUTROPHILS # BLD AUTO: 12.4 K/UL (ref 1.8–7.7)
NEUTROPHILS NFR BLD: 74.3 % (ref 38–73)
NEUTROPHILS NFR BLD: 74.3 % (ref 38–73)
NEUTROPHILS NFR BLD: 74.5 % (ref 38–73)
NEUTROPHILS NFR BLD: 74.9 % (ref 38–73)
NITRITE UR QL STRIP: NEGATIVE
NRBC BLD-RTO: 0 /100 WBC
PH UR STRIP: 5 [PH] (ref 5–8)
PHOSPHATE SERPL-MCNC: 2.2 MG/DL (ref 2.7–4.5)
PLATELET # BLD AUTO: 347 K/UL (ref 150–350)
PLATELET # BLD AUTO: 347 K/UL (ref 150–350)
PLATELET # BLD AUTO: 370 K/UL (ref 150–350)
PLATELET # BLD AUTO: 377 K/UL (ref 150–350)
PMV BLD AUTO: 10 FL (ref 9.2–12.9)
PMV BLD AUTO: 10.1 FL (ref 9.2–12.9)
PMV BLD AUTO: 9.9 FL (ref 9.2–12.9)
PMV BLD AUTO: 9.9 FL (ref 9.2–12.9)
POCT GLUCOSE: 163 MG/DL (ref 70–110)
POCT GLUCOSE: 165 MG/DL (ref 70–110)
POCT GLUCOSE: 167 MG/DL (ref 70–110)
POCT GLUCOSE: 182 MG/DL (ref 70–110)
POCT GLUCOSE: 189 MG/DL (ref 70–110)
POTASSIUM SERPL-SCNC: 4.2 MMOL/L (ref 3.5–5.1)
POTASSIUM SERPL-SCNC: 4.2 MMOL/L (ref 3.5–5.1)
PROT SERPL-MCNC: 6 G/DL (ref 6–8.4)
PROT SERPL-MCNC: 6.3 G/DL (ref 6–8.4)
PROT UR QL STRIP: ABNORMAL
PROTHROMBIN TIME: 10.2 SEC (ref 9–12.5)
PROTHROMBIN TIME: 9.9 SEC (ref 9–12.5)
RBC # BLD AUTO: 2.72 M/UL (ref 4–5.4)
RBC # BLD AUTO: 2.72 M/UL (ref 4–5.4)
RBC # BLD AUTO: 2.87 M/UL (ref 4–5.4)
RBC # BLD AUTO: 2.9 M/UL (ref 4–5.4)
RBC #/AREA URNS AUTO: 16 /HPF (ref 0–4)
SODIUM SERPL-SCNC: 141 MMOL/L (ref 136–145)
SODIUM SERPL-SCNC: 142 MMOL/L (ref 136–145)
SP GR UR STRIP: 1.02 (ref 1–1.03)
SQUAMOUS #/AREA URNS AUTO: 25 /HPF
URN SPEC COLLECT METH UR: ABNORMAL
WBC # BLD AUTO: 14.84 K/UL (ref 3.9–12.7)
WBC # BLD AUTO: 16.35 K/UL (ref 3.9–12.7)
WBC # BLD AUTO: 16.68 K/UL (ref 3.9–12.7)
WBC # BLD AUTO: 16.68 K/UL (ref 3.9–12.7)
WBC #/AREA URNS AUTO: >100 /HPF (ref 0–5)
WBC CLUMPS UR QL AUTO: ABNORMAL
YEAST UR QL AUTO: ABNORMAL

## 2019-05-13 PROCEDURE — 86901 BLOOD TYPING SEROLOGIC RH(D): CPT

## 2019-05-13 PROCEDURE — 81001 URINALYSIS AUTO W/SCOPE: CPT

## 2019-05-13 PROCEDURE — 85025 COMPLETE CBC W/AUTO DIFF WBC: CPT | Mod: 91

## 2019-05-13 PROCEDURE — D9220A PRA ANESTHESIA: ICD-10-PCS | Mod: ANES,,, | Performed by: ANESTHESIOLOGY

## 2019-05-13 PROCEDURE — 94761 N-INVAS EAR/PLS OXIMETRY MLT: CPT

## 2019-05-13 PROCEDURE — C9113 INJ PANTOPRAZOLE SODIUM, VIA: HCPCS | Performed by: STUDENT IN AN ORGANIZED HEALTH CARE EDUCATION/TRAINING PROGRAM

## 2019-05-13 PROCEDURE — 87086 URINE CULTURE/COLONY COUNT: CPT

## 2019-05-13 PROCEDURE — 86920 COMPATIBILITY TEST SPIN: CPT

## 2019-05-13 PROCEDURE — 25000003 PHARM REV CODE 250: Performed by: STUDENT IN AN ORGANIZED HEALTH CARE EDUCATION/TRAINING PROGRAM

## 2019-05-13 PROCEDURE — 85730 THROMBOPLASTIN TIME PARTIAL: CPT | Mod: 91

## 2019-05-13 PROCEDURE — 85730 THROMBOPLASTIN TIME PARTIAL: CPT

## 2019-05-13 PROCEDURE — 63600175 PHARM REV CODE 636 W HCPCS: Performed by: STUDENT IN AN ORGANIZED HEALTH CARE EDUCATION/TRAINING PROGRAM

## 2019-05-13 PROCEDURE — D9220A PRA ANESTHESIA: Mod: CRNA,,, | Performed by: NURSE ANESTHETIST, CERTIFIED REGISTERED

## 2019-05-13 PROCEDURE — 97530 THERAPEUTIC ACTIVITIES: CPT

## 2019-05-13 PROCEDURE — 25000003 PHARM REV CODE 250: Performed by: NURSE ANESTHETIST, CERTIFIED REGISTERED

## 2019-05-13 PROCEDURE — 63600175 PHARM REV CODE 636 W HCPCS

## 2019-05-13 PROCEDURE — D9220A PRA ANESTHESIA: Mod: ANES,,, | Performed by: ANESTHESIOLOGY

## 2019-05-13 PROCEDURE — 87088 URINE BACTERIA CULTURE: CPT

## 2019-05-13 PROCEDURE — 45330 DIAGNOSTIC SIGMOIDOSCOPY: CPT | Mod: ,,, | Performed by: COLON & RECTAL SURGERY

## 2019-05-13 PROCEDURE — 99233 SBSQ HOSP IP/OBS HIGH 50: CPT | Mod: ,,, | Performed by: SURGERY

## 2019-05-13 PROCEDURE — 80053 COMPREHEN METABOLIC PANEL: CPT

## 2019-05-13 PROCEDURE — 85610 PROTHROMBIN TIME: CPT

## 2019-05-13 PROCEDURE — 97535 SELF CARE MNGMENT TRAINING: CPT

## 2019-05-13 PROCEDURE — 99900026 HC AIRWAY MAINTENANCE (STAT)

## 2019-05-13 PROCEDURE — 37000008 HC ANESTHESIA 1ST 15 MINUTES: Performed by: ANESTHESIOLOGY

## 2019-05-13 PROCEDURE — 87106 FUNGI IDENTIFICATION YEAST: CPT

## 2019-05-13 PROCEDURE — 94003 VENT MGMT INPAT SUBQ DAY: CPT

## 2019-05-13 PROCEDURE — 99233 PR SUBSEQUENT HOSPITAL CARE,LEVL III: ICD-10-PCS | Mod: ,,, | Performed by: SURGERY

## 2019-05-13 PROCEDURE — 84100 ASSAY OF PHOSPHORUS: CPT

## 2019-05-13 PROCEDURE — 83605 ASSAY OF LACTIC ACID: CPT

## 2019-05-13 PROCEDURE — 80053 COMPREHEN METABOLIC PANEL: CPT | Mod: 91

## 2019-05-13 PROCEDURE — 20000000 HC ICU ROOM

## 2019-05-13 PROCEDURE — 27000221 HC OXYGEN, UP TO 24 HOURS

## 2019-05-13 PROCEDURE — 83735 ASSAY OF MAGNESIUM: CPT

## 2019-05-13 PROCEDURE — D9220A PRA ANESTHESIA: ICD-10-PCS | Mod: CRNA,,, | Performed by: NURSE ANESTHETIST, CERTIFIED REGISTERED

## 2019-05-13 PROCEDURE — 85610 PROTHROMBIN TIME: CPT | Mod: 91

## 2019-05-13 PROCEDURE — 45330 DIAGNOSTIC SIGMOIDOSCOPY: CPT | Performed by: COLON & RECTAL SURGERY

## 2019-05-13 PROCEDURE — 63600175 PHARM REV CODE 636 W HCPCS: Performed by: NURSE ANESTHETIST, CERTIFIED REGISTERED

## 2019-05-13 PROCEDURE — 94770 HC EXHALED C02 TEST: CPT

## 2019-05-13 PROCEDURE — 99900035 HC TECH TIME PER 15 MIN (STAT)

## 2019-05-13 PROCEDURE — 45330 PR SIGMOIDOSCOPY,DIAG2STIC: ICD-10-PCS | Mod: ,,, | Performed by: COLON & RECTAL SURGERY

## 2019-05-13 RX ORDER — PROPOFOL 10 MG/ML
VIAL (ML) INTRAVENOUS
Status: DISCONTINUED | OUTPATIENT
Start: 2019-05-13 | End: 2019-05-13

## 2019-05-13 RX ORDER — HYDROMORPHONE HYDROCHLORIDE 1 MG/ML
1 INJECTION, SOLUTION INTRAMUSCULAR; INTRAVENOUS; SUBCUTANEOUS EVERY 6 HOURS PRN
Status: DISCONTINUED | OUTPATIENT
Start: 2019-05-13 | End: 2019-06-05 | Stop reason: HOSPADM

## 2019-05-13 RX ORDER — HYDROMORPHONE HYDROCHLORIDE 2 MG/ML
INJECTION, SOLUTION INTRAMUSCULAR; INTRAVENOUS; SUBCUTANEOUS
Status: COMPLETED
Start: 2019-05-13 | End: 2019-05-13

## 2019-05-13 RX ORDER — HYDROMORPHONE HYDROCHLORIDE 1 MG/ML
0.5 INJECTION, SOLUTION INTRAMUSCULAR; INTRAVENOUS; SUBCUTANEOUS ONCE
Status: COMPLETED | OUTPATIENT
Start: 2019-05-13 | End: 2019-05-13

## 2019-05-13 RX ORDER — SODIUM CHLORIDE 9 MG/ML
INJECTION, SOLUTION INTRAVENOUS CONTINUOUS PRN
Status: DISCONTINUED | OUTPATIENT
Start: 2019-05-13 | End: 2019-05-13

## 2019-05-13 RX ORDER — LIDOCAINE HCL/PF 100 MG/5ML
SYRINGE (ML) INTRAVENOUS
Status: DISCONTINUED | OUTPATIENT
Start: 2019-05-13 | End: 2019-05-13

## 2019-05-13 RX ADMIN — CEFAZOLIN 2 G: 1 INJECTION, POWDER, FOR SOLUTION INTRAMUSCULAR; INTRAVENOUS at 09:05

## 2019-05-13 RX ADMIN — CHLORHEXIDINE GLUCONATE 0.12% ORAL RINSE 15 ML: 1.2 LIQUID ORAL at 08:05

## 2019-05-13 RX ADMIN — INSULIN ASPART 3 UNITS: 100 INJECTION, SOLUTION INTRAVENOUS; SUBCUTANEOUS at 05:05

## 2019-05-13 RX ADMIN — CEFAZOLIN 2 G: 1 INJECTION, POWDER, FOR SOLUTION INTRAMUSCULAR; INTRAVENOUS at 02:05

## 2019-05-13 RX ADMIN — PROPOFOL 20 MG: 10 INJECTION, EMULSION INTRAVENOUS at 02:05

## 2019-05-13 RX ADMIN — INSULIN ASPART 3 UNITS: 100 INJECTION, SOLUTION INTRAVENOUS; SUBCUTANEOUS at 04:05

## 2019-05-13 RX ADMIN — HYDROMORPHONE HYDROCHLORIDE 0.5 MG: 1 INJECTION, SOLUTION INTRAMUSCULAR; INTRAVENOUS; SUBCUTANEOUS at 05:05

## 2019-05-13 RX ADMIN — MICONAZOLE NITRATE: 20 OINTMENT TOPICAL at 08:05

## 2019-05-13 RX ADMIN — SODIUM CHLORIDE: 0.9 INJECTION, SOLUTION INTRAVENOUS at 02:05

## 2019-05-13 RX ADMIN — PANTOPRAZOLE SODIUM 40 MG: 40 INJECTION, POWDER, FOR SOLUTION INTRAVENOUS at 09:05

## 2019-05-13 RX ADMIN — PROPOFOL 30 MG: 10 INJECTION, EMULSION INTRAVENOUS at 02:05

## 2019-05-13 RX ADMIN — INSULIN ASPART 3 UNITS: 100 INJECTION, SOLUTION INTRAVENOUS; SUBCUTANEOUS at 08:05

## 2019-05-13 RX ADMIN — RIFAMPIN 300 MG: 600 INJECTION, POWDER, LYOPHILIZED, FOR SOLUTION INTRAVENOUS at 03:05

## 2019-05-13 RX ADMIN — MICONAZOLE NITRATE: 20 OINTMENT TOPICAL at 09:05

## 2019-05-13 RX ADMIN — CHLORHEXIDINE GLUCONATE 0.12% ORAL RINSE 15 ML: 1.2 LIQUID ORAL at 09:05

## 2019-05-13 RX ADMIN — LIDOCAINE HYDROCHLORIDE 100 MG: 20 INJECTION, SOLUTION INTRAVENOUS at 02:05

## 2019-05-13 RX ADMIN — HYDROXYZINE HYDROCHLORIDE 10 MG: 10 SOLUTION ORAL at 11:05

## 2019-05-13 RX ADMIN — CEFAZOLIN 2 G: 1 INJECTION, POWDER, FOR SOLUTION INTRAMUSCULAR; INTRAVENOUS at 05:05

## 2019-05-13 RX ADMIN — HYDROMORPHONE HYDROCHLORIDE 0.5 MG: 1 INJECTION, SOLUTION INTRAMUSCULAR; INTRAVENOUS; SUBCUTANEOUS at 06:05

## 2019-05-13 RX ADMIN — HYDROMORPHONE HYDROCHLORIDE 2 MG: 2 INJECTION INTRAMUSCULAR; INTRAVENOUS; SUBCUTANEOUS at 10:05

## 2019-05-13 RX ADMIN — RIFAMPIN 300 MG: 600 INJECTION, POWDER, LYOPHILIZED, FOR SOLUTION INTRAVENOUS at 02:05

## 2019-05-13 RX ADMIN — PROPOFOL 50 MG: 10 INJECTION, EMULSION INTRAVENOUS at 02:05

## 2019-05-13 RX ADMIN — ACETAMINOPHEN 650 MG: 325 TABLET ORAL at 03:05

## 2019-05-13 NOTE — SUBJECTIVE & OBJECTIVE
Interval History/Significant Events:  Left IJ double lumen placed yesterday. Bloody BM overnight.     Follow-up For: Procedure(s) (LRB):  CREATION, TRACHEOSTOMY (N/A)  BRONCHOSCOPY (N/A)  EGD, WITH PEG TUBE INSERTION    Post-Operative Day: 10 Days Post-Op    Objective:     Vital Signs (Most Recent):  Temp: 99.7 °F (37.6 °C) (05/13/19 0702)  Pulse: 93 (05/13/19 0817)  Resp: 18 (05/13/19 0817)  BP: (!) 160/77 (05/13/19 0800)  SpO2: 100 % (05/13/19 0817) Vital Signs (24h Range):  Temp:  [98.8 °F (37.1 °C)-100.6 °F (38.1 °C)] 99.7 °F (37.6 °C)  Pulse:  [78-96] 93  Resp:  [0-38] 18  SpO2:  [100 %] 100 %  BP: (110-193)/(56-86) 160/77     Weight: 84 kg (185 lb 3 oz)  Body mass index is 30.82 kg/m².      Intake/Output Summary (Last 24 hours) at 5/13/2019 0840  Last data filed at 5/13/2019 0700  Gross per 24 hour   Intake 2032.2 ml   Output 420 ml   Net 1612.2 ml       Physical Exam   Constitutional: She appears well-developed and well-nourished.   HENT:   Head: Normocephalic and atraumatic.   Tracheostomy in place   Eyes: Right eye exhibits no discharge. Left eye exhibits no discharge.   Neck: Normal range of motion. Neck supple.   Cardiovascular: Normal rate and regular rhythm.   Pulmonary/Chest: Effort normal. No respiratory distress.   Abdominal:   Midline incision c/d/i.  PEG with no leak. Abdomen soft, non-distended.     Genitourinary:   Genitourinary Comments: Nephrostomy tube in place with watery dark yellow output.   Musculoskeletal: Normal range of motion.   Neurological:   Able to follow commands, denying pain.    Skin: Skin is warm and dry.   Vitals reviewed.      Vents:  Vent Mode: PAV+ (05/13/19 0817)  Ventilator Initiated: Yes (05/08/19 0630)  Set Rate: 18 bmp (05/13/19 0742)  Vt Set: 420 mL (05/13/19 0742)  Pressure Support: 0 cmH20 (04/29/19 1014)  PEEP/CPAP: 5 cmH20 (05/13/19 0817)  Oxygen Concentration (%): 42 (05/13/19 0817)  Peak Airway Pressure: 13 cmH2O (05/13/19 0817)  Plateau Pressure: 0 cmH20  (05/13/19 0817)  Total Ve: 6.24 mL (05/13/19 0817)  Negative Inspiratory Force (cm H2O): -18 (04/27/19 1306)  F/VT Ratio<105 (RSBI): (!) 44.33 (05/13/19 0817)    Lines/Drains/Airways     Central Venous Catheter Line                 Percutaneous Central Line Insertion/Assessment - triple lumen  05/12/19 1256  less than 1 day          Drain                 Closed/Suction Drain 04/21/19 0300 LLQ 22 days         Nephrostomy 04/21/19 0629 Left 8 Fr. 22 days         Gastrostomy/Enterostomy 05/02/19 1134 Percutaneous endoscopic gastrostomy (PEG) LUQ decompression;feeding 10 days          Airway                 Surgical Airway 05/02/19 1107 Shiley Cuffed 10 days          Peripheral Intravenous Line                 Midline Catheter Insertion/Assessment  - Single Lumen 05/07/19 1632 Left basilic vein (medial side of arm) 18g x 8cm 5 days         Peripheral IV - Single Lumen 05/10/19 1020 22 G Right Upper Arm 2 days                Significant Labs:    CBC/Anemia Profile:  Recent Labs   Lab 05/12/19  0022 05/12/19  1347 05/13/19  0246   WBC 13.64* 13.56* 14.84*   HGB 8.3* 9.0* 8.3*   HCT 26.4* 28.3* 26.6*    381* 370*   MCV 90 93 93   RDW 15.1* 15.1* 15.3*        Chemistries:  Recent Labs   Lab 05/12/19  0359 05/13/19  0246    142   K 3.9 4.2    108   CO2 25 24   BUN 32* 40*   CREATININE 1.1 1.5*   CALCIUM 8.5* 8.7   ALBUMIN 1.6* 1.7*   PROT 5.9* 6.3   BILITOT 0.7 0.6   ALKPHOS 91 99   ALT 12 10   AST 15 18   MG 2.2 2.2   PHOS 2.8 2.2*       Significant Imaging:  I have reviewed all pertinent imaging results/findings within the past 24 hours.

## 2019-05-13 NOTE — PROGRESS NOTES
"[facility]  Adult Nutrition  Progress Note    SUMMARY       Recommendations  Recommendation/Intervention:   1. Current TF providing < 85% EEN. Recommend modifying to Glucerna 1.5 at a goal rate of 45 mL/hr - to provide 1620 kcal/day, 89g protein/day, and 820mL free fluid/day.   2. PO diet per SLP.   RD to monitor.    Goals: Patient to receive nutrition by RD follow-up  Nutrition Goal Status: goal met  Communication of RD Recs: reviewed with RN    Reason for Assessment  Reason For Assessment: RD follow-up  Diagnosis: surgery/postoperative complications(ureteral transection of L kidney)  Relevant Medical History: CAD, DM2, HTN, HLD  General Information Comments: Trach/vent. Still tolerating TF at goal rate. Physical exam remains unchanged, continues to appear nourished with no physical signs of malnutrition at this time.  Nutrition Discharge Planning: Unable to determine at this time.    Nutrition Risk Screen  Nutrition Risk Screen: tube feeding or parenteral nutrition    Nutrition/Diet History  Spiritual, Cultural Beliefs, Zoroastrianism Practices, Values that Affect Care: no  Factors Affecting Nutritional Intake: NPO    Anthropometrics  Temp: 98.5 °F (36.9 °C)  Height Method: Estimated  Height: 5' 5" (165.1 cm)  Height (inches): 65 in  Weight Method: Bed Scale  Weight: 84 kg (185 lb 3 oz)  Weight (lb): 185.19 lb  Ideal Body Weight (IBW), Female: 125 lb  % Ideal Body Weight, Female (lb): 148.15 lb  BMI (Calculated): 30.9  BMI Grade: 30 - 34.9- obesity - grade I    Lab/Procedures/Meds  Pertinent Labs Reviewed: reviewed  Pertinent Labs Comments: BUN 40, Creat 1.5, Glu 169, Phos 2.2, Alb 1.7  Pertinent Medications Reviewed: reviewed  Pertinent Medications Comments: insulin, pantoprazole    Estimated/Assessed Needs  Weight Used For Calorie Calculations: 71.4 kg (157 lb 6.5 oz)(admit weight)  Energy Calorie Requirements (kcal): 1599 kcal/day  Energy Need Method: [place]-St Jeor( x 1.25)  Protein " Requirements:  g/day(1.2-1.4 g/kg)  Weight Used For Protein Calculations: 71.4 kg (157 lb 6.5 oz)(admit weight)  Fluid Requirements (mL): 1 mL/kcal or per MD  Estimated Fluid Requirement Method: RDA Method  RDA Method (mL): 1599    Nutrition Prescription Ordered  Current Diet Order: NPO  Current Nutrition Support Formula Ordered: Peptamen Intense VHP  Current Nutrition Support Rate Ordered: 40 (ml)  Current Nutrition Support Frequency Ordered: mL/hr    Evaluation of Received Nutrient/Fluid Intake  Enteral Calories (kcal): 960  Enteral Protein (gm): 88  Enteral (Free Water) Fluid (mL): 806  % Kcal Needs: 60  % Protein Needs: 100  Other Fluid (mL): 1200(300mL q 6 hours)  Total Fluid Intake (mL): 2006  I/O: +1.7L x 24hrs, +10L since admit  Energy Calories Required: not meeting needs  Protein Required: meeting needs  Fluid Required: (per MD)  Comments: LBM 5/13  Tolerance: tolerating  % Intake of Estimated Energy Needs: 50 - 75 %  % Meal Intake: NPO    Nutrition Risk  Level of Risk/Frequency of Follow-up: low(1x/week)     Assessment and Plan  Nutrition Problem  Inadequate energy intake     Related to (etiology):   Decreased ability to consume sufficient energy     Signs and Symptoms (as evidenced by):   NPO, TF providing < 85% EEN and EPN     Interventions/Recommendations (treatment strategy):  Collaboration and referral of nutrition care     Nutrition Diagnosis Status:   Continues    Monitor and Evaluation  Food and Nutrient Intake: energy intake, enteral nutrition intake  Food and Nutrient Adminstration: enteral and parenteral nutrition administration  Anthropometric Measurements: weight, weight change  Biochemical Data, Medical Tests and Procedures: electrolyte and renal panel, gastrointestinal profile, glucose/endocrine profile, inflammatory profile  Nutrition-Focused Physical Findings: overall appearance     Nutrition Follow-Up  RD Follow-up?: Yes

## 2019-05-13 NOTE — ANESTHESIA POSTPROCEDURE EVALUATION
Anesthesia Post Evaluation    Patient: Mariann Huff    Procedure(s) Performed: * No procedures listed *    Final Anesthesia Type: general  Patient location during evaluation: ICU  Patient participation: Yes- Able to Participate  Level of consciousness: awake  Post-procedure vital signs: reviewed and stable  Pain management: adequate  Airway patency: patent  PONV status at discharge: No PONV  Anesthetic complications: no      Cardiovascular status: blood pressure returned to baseline and stable  Respiratory status: spontaneous ventilation and ventilator (trach)  Hydration status: euvolemic  Follow-up not needed.          Vitals Value Taken Time   /69 5/13/2019  3:23 PM   Temp 37.7 °C (99.8 °F) 5/13/2019  3:02 PM   Pulse 94 5/13/2019  3:24 PM   Resp 17 5/13/2019  3:24 PM   SpO2 100 % 5/13/2019  3:24 PM   Vitals shown include unvalidated device data.      No case tracking events are documented in the log.      Pain/Derick Score: Pain Rating Prior to Med Admin: 10 (5/13/2019  3:07 PM)  Pain Rating Post Med Admin: 1 (5/12/2019  3:01 PM)

## 2019-05-13 NOTE — PROGRESS NOTES
Wound care consult received from MD team for bilateral lumbar incisions in which staples were removed today.  Upon assessment, noted 2 non-healing incisions.  See assessment below.  Recommend MediHoney daily dressings to incisions to reduce bioburden and promote healing.  Wound care team to follow prn  [id number]       05/13/19 1236        Incision/Site 05/02/19 1142 Left Back   Date First Assessed/Time First Assessed: 05/02/19 1142   Side: Left  Location: (c) Back   Wound Image    Incision WDL ex   Dressing Appearance Intact   Drainage Amount Small   Drainage Characteristics/Odor Serosanguineous   Appearance Black;Pink;Red;Slough   Wound Length (cm) 1.8 cm   Wound Width (cm) 1.8 cm   Wound Depth (cm) 0.1 cm   Wound Volume (cm^3) 0.32 cm^3   Wound Surface Area (cm^2) 3.24 cm^2   Dressing Reinforced  (ordered Medihoney)   Dressing Change Due 05/13/19        Incision/Site 05/13/19 Right Back   Date First Assessed: 05/13/19   Side: Right  Location: Back   Wound Image    Incision WDL ex   Dressing Appearance Intact   Drainage Amount Small   Drainage Characteristics/Odor Serosanguineous   Appearance Slough;Pink   Wound Length (cm) 1.8 cm   Wound Width (cm) 1 cm   Wound Depth (cm) 0.1 cm   Wound Volume (cm^3) 0.18 cm^3   Wound Surface Area (cm^2) 1.8 cm^2   Dressing Reinforced  (ordered MediHoney)   Dressing Change Due 05/13/19

## 2019-05-13 NOTE — PLAN OF CARE
05/13/19 1443   Discharge Assessment   Assessment Type Discharge Planning Reassessment   Discharge Plan A Long-term acute care facility (LTAC)   Discharge Plan B Rehab   DME Needed Upon Discharge  medication pump;walker, rolling;shower chair  (Pt will need IV ABX for 6 weeks. End date 6/4/2019)     Pt remains in ICU. Trach, PEG, flex sig today for BRBPR. Will continue to monitor and help with discharge planning throughout admission.

## 2019-05-13 NOTE — SUBJECTIVE & OBJECTIVE
"Interval HPI:   Overnight events: Remains in SICU. NAEON. BG within goal ranges on scheduled Novolog and SQ correction scale.   Eating:   NPO  Nausea: No  Hypoglycemia and intervention: No  Fever: Yes (99.7)   TPN and/or TF: Yes  If yes, type of TF/TPN and rate: Paptamen @ 40 cc/hr    BP (!) 149/70 (BP Location: Right arm, Patient Position: Lying)   Pulse 93   Temp 99.7 °F (37.6 °C) (Oral)   Resp 19   Ht 5' 5" (1.651 m)   Wt 84 kg (185 lb 3 oz)   SpO2 100%   Breastfeeding? No   BMI 30.82 kg/m²     Labs Reviewed and Include    Recent Labs   Lab 05/13/19  0246   *   CALCIUM 8.7   ALBUMIN 1.7*   PROT 6.3      K 4.2   CO2 24      BUN 40*   CREATININE 1.5*   ALKPHOS 99   ALT 10   AST 18   BILITOT 0.6     Lab Results   Component Value Date    WBC 14.84 (H) 05/13/2019    HGB 8.3 (L) 05/13/2019    HCT 26.6 (L) 05/13/2019    MCV 93 05/13/2019     (H) 05/13/2019     No results for input(s): TSH, FREET4 in the last 168 hours.  Lab Results   Component Value Date    HGBA1C 10.8 (H) 02/19/2019       Nutritional status:   Body mass index is 30.82 kg/m².  Lab Results   Component Value Date    ALBUMIN 1.7 (L) 05/13/2019    ALBUMIN 1.6 (L) 05/12/2019    ALBUMIN 1.5 (L) 05/11/2019     No results found for: PREALBUMIN    Estimated Creatinine Clearance: 43.8 mL/min (A) (based on SCr of 1.5 mg/dL (H)).    Accu-Checks  Recent Labs     05/11/19  1611 05/11/19  2053 05/12/19  0013 05/12/19  0428 05/12/19  0904 05/12/19  1159 05/12/19  1634 05/12/19  2028 05/12/19  2336 05/13/19  0403   POCTGLUCOSE 185* 174* 181* 176* 215* 193* 177* 187* 180* 182*       Current Medications and/or Treatments Impacting Glycemic Control  Immunotherapy:    Immunosuppressants     None        Steroids:   Hormones (From admission, onward)    None        Pressors:    Autonomic Drugs (From admission, onward)    None        Hyperglycemia/Diabetes Medications:   Antihyperglycemics (From admission, onward)    Start     Stop Route " Frequency Ordered    05/11/19 1200  insulin aspart U-100 pen 3 Units      -- SubQ Every 24 hours (non-standard times) 05/10/19 1351    05/11/19 0800  insulin aspart U-100 pen 3 Units      -- SubQ Every 24 hours (non-standard times) 05/10/19 1351    05/11/19 0400  insulin aspart U-100 pen 3 Units      -- SubQ Every 24 hours (non-standard times) 05/10/19 1351    05/11/19 0000  insulin aspart U-100 pen 3 Units      -- SubQ Every 24 hours (non-standard times) 05/10/19 1351    05/10/19 2000  insulin aspart U-100 pen 3 Units      -- SubQ Every 24 hours (non-standard times) 05/10/19 1351    05/10/19 1600  insulin aspart U-100 pen 3 Units      -- SubQ Every 24 hours (non-standard times) 05/10/19 1351    05/10/19 1450  insulin aspart U-100 pen 0-5 Units      -- SubQ Every 4 hours PRN 05/10/19 1351

## 2019-05-13 NOTE — PLAN OF CARE
Problem: Occupational Therapy Goal  Goal: Occupational Therapy Goal  Goals to be met by: 5/20/19     Patient will increase functional independence with ADLs by performing:    UE Dressing with Set-up Assistance.  LE Dressing with Maximum Assistance and Assistive Devices as needed.  Grooming while standing with Minimal Assistance.  Toileting from bedside commode with Minimal Assistance for hygiene and clothing management.   Sitting at edge of bed x10 minutes with Supervision.  Rolling to Bilateral with Minimal Assistance.   Supine to sit with Minimal Assistance.  Toilet transfer to bedside commode with Minimal Assistance.     Outcome: Ongoing (interventions implemented as appropriate)  Continue POC     Shania Harding, OTR/L  [phone]  5/13/2019

## 2019-05-13 NOTE — PLAN OF CARE
Problem: Adult Inpatient Plan of Care  Goal: Plan of Care Review  Outcome: Ongoing (interventions implemented as appropriate)  VSS. Pt remains on ventilator, tacheostomy, at 40% FiO2 and 5 PEEP. Pt is AAO x 4, will move all extremities and follow all commands. MADHURI and nephrostomy tube output on flowsheets. PT had 3 bloody, dark red/brown BM. Plan of care discussed with patient and family and all questions and concerns addressed.

## 2019-05-13 NOTE — ANESTHESIA PREPROCEDURE EVALUATION
05/13/2019  [facility]  Anesthesia Pre-Operative Evaluation         Patient Name: Mariann Huff  YOB: 1960  MRN: [id number]    SUBJECTIVE:     Pre-operative evaluation for Procedure(s) (LRB):  CREATION, TRACHEOSTOMY (N/A)  BRONCHOSCOPY (N/A)  EGD, WITH PEG TUBE INSERTION     05/13/2019    Mariann Huff is a 59 y.o. female w/ a significant PMHx HTN, DM II, CAD, NSTEMI, s/p L5-S1 OLIF with NSGY 4/12 c/b intraoperative left ureteral injury and underwent ureteroureteral anastomoses and ureteral stent placement complicated by sepsis due to E.coli septicemia now s/p ex lap on 4/21 and PEG/Trach of the vent now having BRBPR.    Patient now presents for the above procedure(s).      LDA:        Percutaneous Central Line Insertion/Assessment - triple lumen  05/12/19 1256  (Active)   Dressing biopatch in place 5/13/2019 11:02 AM   Securement secured w/ sutures 5/13/2019 11:02 AM   Additional Site Signs no erythema;no warmth;no edema;no pain;no palpable cord;no streak formation;no drainage 5/13/2019 11:02 AM   Distal Patency/Care infusing 5/13/2019 11:02 AM   Medial Patency/Care flushed w/o difficulty 5/13/2019 11:02 AM   Proximal Patency/Care flushed w/o difficulty 5/13/2019 11:02 AM   Line Interventions line leveled/zeroed;blood specimen obtained and sent to lab 5/13/2019 11:02 AM   Dressing Change Due 05/19/19 5/13/2019 11:02 AM   Daily Line Review Performed 5/13/2019 11:02 AM   Number of days: 1            Peripheral IV - Single Lumen 05/10/19 1020 22 G Right Upper Arm (Active)   Site Assessment Clean;Dry;Intact 5/13/2019 11:02 AM   Line Status No blood return 5/13/2019 11:02 AM   Dressing Status Clean;Dry;Intact 5/13/2019 11:02 AM   Dressing Intervention Other (Comment) 5/13/2019  3:00 AM   Dressing Change Due 05/14/19 5/13/2019 11:02 AM   Site Change Due 05/14/19 5/13/2019  11:02 AM   Reason Not Rotated Not due 5/13/2019 11:02 AM   Number of days: 3            Midline Catheter Insertion/Assessment  - Single Lumen 05/07/19 1632 Left basilic vein (medial side of arm) 18g x 8cm (Active)   Site Assessment Clean;Dry;Intact 5/13/2019 11:02 AM   IV Device Securement catheter securement device 5/13/2019 11:02 AM   Line Status No blood return 5/13/2019 11:02 AM   Dressing Status Biopatch in place;Clean;Dry;Intact 5/13/2019 11:02 AM   Dressing Intervention Other (Comment) 5/13/2019  3:00 AM   Dressing Change Due 05/14/19 5/13/2019 11:02 AM   Site Change Due 05/14/19 5/13/2019 11:02 AM   Reason Not Rotated Not due 5/13/2019 11:02 AM   Number of days: 5            Closed/Suction Drain 04/21/19 0300 LLQ (Active)   Site Description Unable to view 5/13/2019 11:02 AM   Dressing Type Gauze 5/13/2019 11:02 AM   Dressing Status Clean;Dry;New drainage 5/13/2019 11:02 AM   Dressing Intervention Other (Comment) 5/13/2019  3:00 AM   Drainage Yellow 5/13/2019 11:02 AM   Status To bulb suction 5/13/2019 11:02 AM   Output (mL) 10 mL 5/13/2019  7:00 AM   Number of days: 22            Nephrostomy 04/21/19 0629 Left 8 Fr. (Active)   Urine Characteristics yellow 5/13/2019 11:02 AM   Clamp Status unclamped 5/13/2019 11:02 AM   Characteristics no redness;no warmth;no drainage;no tenderness;no swelling 5/13/2019 11:02 AM   Drainage yellow drainage 5/13/2019 11:02 AM   Dressing transparent dressing 5/13/2019 11:02 AM   Dressing Type Special type 5/13/2019  3:00 AM   Site Care secured w/ tape 5/13/2019  3:00 AM   Dressing Change Due 05/19/19 5/13/2019 11:02 AM   Collection Container Standard drainage bag 5/13/2019 11:02 AM   Securement Method secured to lower ABD w/ tape 5/13/2019  3:00 AM   Output (mL) 50 mL 5/13/2019 11:00 AM   Number of days: 22            Gastrostomy/Enterostomy 05/02/19 1134 Percutaneous endoscopic gastrostomy (PEG) LUQ decompression;feeding (Active)   Securement secured to abdomen 5/13/2019 11:02  AM   Interventions Prior to Feeding patency checked 5/13/2019 11:02 AM   Suction Setting/Drainage Method continuous setting 5/13/2019 11:02 AM   Drainage serous 5/5/2019  3:00 AM   Feeding Type continuous;by pump 5/13/2019 11:02 AM   Clamp Status/Tolerance unclamped 5/13/2019 11:02 AM   Feeding Action feeding continued 5/13/2019 11:02 AM   Dressing dry and intact 5/13/2019 11:02 AM   Insertion Site no redness;no warmth;no drainage;no tenderness;no swelling 5/13/2019 11:02 AM   Site Care device rotatated;site cleansed w/ sterile normal saline;sterile precut T-slit dressing applied 5/13/2019  3:00 AM   Flush/Irrigation flushed w/;water;no resistance met 5/13/2019  3:00 AM   Dressing Change Due 05/04/19 5/5/2019  3:00 AM   Current Rate (mL/hr) 40 mL/hr 5/13/2019  3:00 AM   Goal Rate (mL/hr) 40 mL/hr 5/13/2019  3:00 AM   Intake (mL) 50 mL 5/9/2019 11:25 PM   Water Bolus (mL) 300 mL 5/13/2019  3:00 AM   Tube Output(mL)(Include Discarded Residual) 150 mL 5/3/2019  3:15 PM   Formula Name Peptamen Intense 5/3/2019  3:15 PM   Tube Feeding Intake (mL) 0 5/13/2019  8:00 AM   Residual Amount (ml) 10 ml 5/13/2019  3:00 AM   Number of days: 11       Prev airway: None documented.    Drips:    heparin (porcine) in D5W      lactated ringers 50 mL/hr at 05/09/19 1800       Patient Active Problem List   Diagnosis    Hypertension associated with diabetes    Hyperlipidemia    CAD (coronary artery disease)    Sleep apnea    Carotid stenosis, bilateral    Non compliance with medical treatment    PAPA (acute kidney injury)    Coronary stent restenosis    S/P coronary artery stent placement    Nuclear sclerosis - Right Eye    Primary localized osteoarthrosis, lower leg    Chronic sinusitis    PSC (posterior subcapsular cataract), right    DME (diabetic macular edema)    Refractive error    Pseudophakia    Senile nuclear sclerosis    Type 2 diabetes mellitus with diabetic peripheral angiopathy without gangrene    Diabetic  peripheral neuropathy associated with type 2 diabetes mellitus    Cervical arthritis    Neurogenic claudication    Lumbar herniated disc    Fatty liver disease, nonalcoholic    Arthritis of lumbar spine    Chronic pain    Lumbar radiculopathy    Lumbosacral radiculopathy at L5    Ureteral transection of left native kidney    Ureteral stent retained    Sepsis    Encephalopathy    Type 2 diabetes mellitus, with long-term current use of insulin    HLD (hyperlipidemia)    Asthma    Essential hypertension    Altered mental status    Encounter for weaning from ventilator    Metabolic acidosis    At risk for fluid volume overload    On enteral nutrition    Postoperative infection    Acute postoperative respiratory failure    Pulmonary edema    Anemia    Dermatitis associated with moisture    BRBPR (bright red blood per rectum)    Bradycardia       Review of patient's allergies indicates:  No Known Allergies    Current Inpatient Medications:   ceFAZolin (ANCEF) IVPB  2 g Intravenous Q8H    chlorhexidine  15 mL Mouth/Throat BID    insulin aspart U-100  3 Units Subcutaneous Q24H    insulin aspart U-100  3 Units Subcutaneous Q24H    insulin aspart U-100  3 Units Subcutaneous Q24H    insulin aspart U-100  3 Units Subcutaneous Q24H    insulin aspart U-100  3 Units Subcutaneous Q24H    insulin aspart U-100  3 Units Subcutaneous Q24H    miconazole nitrate 2%   Topical (Top) BID    pantoprazole  40 mg Intravenous Daily    rifAMpin (RIFADIN) IVPB  300 mg Intravenous Q12H    sodium phosphates  2 enema Rectal Once       Current Facility-Administered Medications on File Prior to Encounter   Medication Dose Route Frequency Provider Last Rate Last Dose    lidocaine (PF) 10 mg/ml (1%) injection 10 mg  1 mL Intradermal Once Dave Bentley Jr., MD         Current Outpatient Medications on File Prior to Encounter   Medication Sig Dispense Refill    albuterol (PROVENTIL/VENTOLIN HFA) 90  "mcg/actuation inhaler Inhale 2 puffs into the lungs every 6 (six) hours as needed for Wheezing. 1 each 11    amLODIPine (NORVASC) 10 MG tablet TAKE 1 TABLET (10 MG TOTAL) BY MOUTH ONCE DAILY. 30 tablet 6    aspirin 81 mg Tab Take 81 mg by mouth. 1 Tablet Oral Every day      atorvastatin (LIPITOR) 40 MG tablet TAKE 1 TABLET (40 MG TOTAL) BY MOUTH ONCE DAILY. 30 tablet 11    ACCU-CHEK EDIN PLUS METER [facility] [place]       BD INSULIN PEN NEEDLE UF MINI 31 x 3/16 " Ndle USE 4 TIMES A  each 11    blood sugar diagnostic (ACCU-CHEK EDIN PLUS TEST STRP) Strp TEST BLOOD SUGARS 4 TIMES DAILY 150 strip 11    chlorthalidone (HYGROTEN) 50 MG Tab Take 1 tablet (50 mg total) by mouth once daily. 90 tablet 3    esomeprazole (NEXIUM) 40 MG capsule TAKE 1 CAPSULE (40 MG TOTAL) BY MOUTH BEFORE BREAKFAST. 90 capsule 3    fenofibrate micronized (LOFIBRA) 134 MG Cap TAKE 1 CAPSULE (134 MG TOTAL) BY MOUTH BEFORE BREAKFAST. 90 capsule 3    flash glucose scanning reader (FREESTYLE MISTI 14 DAY READER) Misc 1 each by Misc.(Non-Drug; Combo Route) route once daily. 2 each 11    flash glucose sensor (FREESTYLE MISTI 14 DAY SENSOR) Kit 1 each by Misc.(Non-Drug; Combo Route) route once daily. 2 kit 11    gabapentin (NEURONTIN) 100 MG capsule Take 2 capsules (200 mg total) by mouth every evening. 60 capsule 11    insulin aspart U-100 (NOVOLOG FLEXPEN U-100 INSULIN) 100 unit/mL InPn pen INJECT 35 U AM, 45 U AT NOON, 35 U PM.150-200 +2, 201-250 +4, 251-300 +6, 301-350 +8, >350 +10 30 Syringe 1    insulin glargine, TOUJEO, (TOUJEO SOLOSTAR U-300 INSULIN) 300 unit/mL (1.5 mL) InPn pen Inject 50 Units into the skin 2 (two) times daily. 6 Syringe 6    INVOKANA 300 mg Tab tablet Take 1 tablet (300 mg total) by mouth once daily. 30 tablet 6    irbesartan (AVAPRO) 300 MG tablet Take 1 tablet (300 mg total) by mouth every evening. 90 tablet 3    lancets 30 gauge Misc       metoprolol succinate (TOPROL-XL) 100 MG 24 hr tablet TAKE 1 TABLET " "(100 MG TOTAL) BY MOUTH ONCE DAILY. 30 tablet 11    nitroGLYCERIN (NITROSTAT) 0.4 MG SL tablet Take one every 2-3 min till chestpain relief for 3 times and if still no relief, call MD or come to ED 30 tablet 11    omega-3 acid ethyl esters (LOVAZA) 1 gram capsule Take 1 g by mouth once daily.       pen needle, diabetic (BD ULTRA-FINE GRABIEL PEN NEEDLES) 32 gauge x 5/32" Ndle For use with insulin pens daily 4 times a day 100 each 11    prasugrel (EFFIENT) 10 mg Tab TAKE 1 TABLET (10 MG TOTAL) BY MOUTH ONCE DAILY. 30 tablet 10    tamsulosin (FLOMAX) 0.4 mg Cap Take 1 capsule (0.4 mg total) by mouth every evening. 30 capsule 2    tramadol (ULTRAM) 50 mg tablet Take 1 tablet (50 mg total) by mouth 3 (three) times daily as needed for Pain. 60 tablet 1    TRULICITY 1.5 mg/0.5 mL PnIj INJECT 1.5 MG INTO THE SKIN EVERY 7 DAYS. 2 mL 6    zolpidem (AMBIEN) 5 MG Tab Take 1 tablet (5 mg total) by mouth nightly as needed. 30 tablet 2       Past Surgical History:   Procedure Laterality Date    BRONCHOSCOPY N/A 2019    Performed by eSan Ruano MD at [facility] OR 2ND FLR    CARDIAC CATHETERIZATION      cataract extraction left eye      cataracts      CATHETERIZATION, HEART, LEFT Left 2014    Performed by Wilman iKm MD at [facility] CATH LAB     SECTION, LOW TRANSVERSE      CORONARY ANGIOPLASTY      Creation, Nephrostomy, Percutaneous Left 2019    Performed by Karina Surgeon at [facility] KARINA    CREATION, TRACHEOSTOMY N/A 2019    Performed by Sean Ruano MD at [facility] OR 2ND FLR    EGD, WITH PEG TUBE INSERTION  2019    Performed by Sean Ruano MD at [facility] OR 2ND FLR    ESOPHAGOGASTRODUODENOSCOPY (EGD) N/A 2016    Performed by Gardenia Adamson MD at [facility] ENDO (4TH FLR)    EXCISION TURBINATE, SUBMUCOUS      FUSION, SPINE, LUMBAR, ANTERIOR APPROACH Left L5-S1 Anterior to Psoa Interbody Fusion, L5-S1 Posterior Instrumentation Left 2019    [facility] Mk George[facility]    HAND SURGERY Left     HAND SURGERY Right     torn ligament repair/ Dr. Yeboah    HYSTERECTOMY      Injection,steroid,epidural,transforaminal approach - Bilateral - S1 Bilateral 9/25/2018    Performed by Tl Abreu MD at [facility] PAIN MGT    Injection-steroid-epidural-caudal N/A 2/7/2019    Performed by Dave Bentley Jr., MD at [facility] PAIN MGT    INSERTION-INTRAOCULAR LENS (IOL) Right 9/1/2015    Performed by Good Domingo MD at [facility] OR [facility] FLR    LAPAROTOMY, EXPLORATORY; LIGATION OF LEFT URETER; DOUBLE J STENT REMOVAL LEFT URETER Left 4/20/2019    Performed by Paul Carlson MD at [facility] OR [address] [place] [address]    left foot surgery      left wrist surgery      NASAL SEPTUM SURGERY  5/7/15    PHACOEMULSIFICATION-ASPIRATION-CATARACT Right 9/1/2015    [facility]    REPAIR, URETER  4/12/2019    [facility] kM George [facility]    RESECTION-TURBINATES (SMR) N/A 5/7/2015    Performed by Dileep Dubois III, MD at [facility] OR [address] [place] [address]    rt elbow surgery      S/P LAD COATED STENT  05/14/2010    6 total     S/P OM1 STENT  08/2003    SEPTOPLASTY N/A 5/7/2015    Performed [facility]ileep [facility]    SINUS SURGERY      F.E.S.S.    SINUS SURGERY FUNCTIONAL ENDOSCOPIC WITH NAVIGATION WITH MAXILLARIES, ETHMOIDS, LEFT SPHENOID, LEFT LOLY N/A 5/7/2015    Performed by Dileep [facility]ois [facility]    STENT, URETERAL placement  4/12/2019    Performed by Robin Boyd MD at [facility] OR [address] [place] [address]    TUBAL LIGATION         Social History     Socioeconomic History    Marital status:      Spouse name: Shamir    Number of children: 2    Years of education: Not on file    Highest education level: Not on file   Occupational History    Occupation: cafeteria     Employer: [employer]     Employer: [employer]     Employer: [employer]   Social Needs     Financial resource strain: Not on file    Food insecurity:     Worry: Not on file     Inability: Not on file    Transportation needs:     Medical: Not on file     Non-medical: Not on file   Tobacco Use    Smoking status: Never Smoker    Smokeless tobacco: Never Used   Substance and Sexual Activity    Alcohol use: No     Alcohol/week: 0.0 oz    Drug use: No    Sexual activity: Yes     Partners: Male     Birth control/protection: Post-menopausal     Comment:    Lifestyle    Physical activity:     Days per week: Not on file     Minutes per session: Not on file    Stress: Not on file   Relationships    Social connections:     Talks on phone: Not on file     Gets together: Not on file     Attends Mu-ism service: Not on file     Active member of club or organization: Not on file     Attends meetings of clubs or organizations: Not on file     Relationship status: Not on file   Other Topics Concern    Are you pregnant or think you may be? No    Breast-feeding No   Social History Narrative    . 2 children.        OBJECTIVE:     Vital Signs Range (Last 24H):  Temp:  [36.9 °C (98.5 °F)-38.1 °C (100.6 °F)]   Pulse:  [78-99]   Resp:  [0-26]   BP: (110-173)/(56-81)   SpO2:  [99 %-100 %]       Significant Labs:  Lab Results   Component Value Date    WBC 16.35 (H) 05/13/2019    HGB 8.4 (L) 05/13/2019    HCT 27.4 (L) 05/13/2019     (H) 05/13/2019    CHOL 202 (H) 01/21/2019    TRIG 100 01/21/2019    HDL 48 01/21/2019    ALT 10 05/13/2019    AST 18 05/13/2019     05/13/2019    K 4.2 05/13/2019     05/13/2019    CREATININE 1.5 (H) 05/13/2019    BUN 40 (H) 05/13/2019    CO2 24 05/13/2019    TSH 1.170 10/22/2018    INR 0.9 05/13/2019    GLUF 217 (H) 09/15/2014    HGBA1C 10.8 (H) 02/19/2019       Diagnostic Studies: No relevant studies.    EKG:     Vent. Rate : 092 BPM     Atrial Rate : 092 BPM     P-R Int : 152 ms          QRS Dur : 072 ms      QT Int : 364 ms       P-R-T Axes : 051 -11 134  degrees     QTc Int : 450 ms    Normal sinus rhythm  Nonspecific T wave abnormality  Abnormal ECG  When compared with ECG of 08-May-2019 06:20:29  Rate slower  T waves inverted laterally  ST segments no longer depressed  Confirmed by fellow Ambar ORTEGA (Unofficial Fellow's Report), [place] ([id number]) on  5/9/2019 9:12:01 AM  Confirmed by Michael Gomez MD ([id number]) on 5/9/2019 11:13:26 AM      2D ECHO:  Results for orders placed or performed in visit on 02/26/14   2D echo with color flow doppler   Result Value Ref Range    QEF 65     Diastolic Dysfunction Yes        TRANSTHORACIC ECHO (TTE) COMPLETE   Conclusion     · Low normal left ventricular systolic function. The estimated ejection fraction is 53%  · Normal LV diastolic function.  · Local segmental wall motion abnormalities.  · Normal right ventricular systolic function.  · Mild to mild-moderate (1-2+) mitral regurgitation.  · Mechanically ventilated; cannot use inferior caval vein diameter to estimate central venous pressure.          ASSESSMENT/PLAN:       Anesthesia Evaluation    I have reviewed the Patient Summary Reports.    I have reviewed the Nursing Notes.   I have reviewed the Medications.     Review of Systems  Anesthesia Hx:  No problems with previous Anesthesia  History of prior surgery of interest to airway management or planning: tracheostomy. Previous anesthesia: General Denies Family Hx of Anesthesia complications.   Denies Personal Hx of Anesthesia complications.   Social:  Non-Smoker, No Alcohol Use    Hematology/Oncology:     Oncology Normal    -- Anemia:   EENT/Dental:EENT/Dental Normal   Cardiovascular:   Hypertension Past MI (NSTEMI) CAD    Denies Angina.    Pulmonary:   Asthma Sleep Apnea S/p Trach   Renal/:   Chronic Renal Disease    Musculoskeletal:   Arthritis     Neurological:   Denies Seizures.   Peripheral Neuropathy    Endocrine:   Diabetes, type 2        Physical Exam  General:  Well nourished, Obesity    Airway/Jaw/Neck:  Airway Findings:  Mouth Opening: Normal Tongue: Normal  Pre-Existing Airway Tube(s): Tracheostomy tube  General Airway Assessment: Adult  TM Distance: Normal, at least 6 cm        Eyes/Ears/Nose:  EYES/EARS/NOSE FINDINGS: Normal   Dental:  Dental Findings: In tact   Chest/Lungs:  Chest/Lungs Findings: Clear to auscultation, Normal Respiratory Rate     Heart/Vascular:  Heart Findings: Rate: Normal  Rhythm: Regular Rhythm        Mental Status:  Mental Status Findings:  Cooperative, Alert and Oriented         Anesthesia Plan  Type of Anesthesia, risks & benefits discussed:  Anesthesia Type:  MAC, general  Patient's Preference:   Intra-op Monitoring Plan: standard ASA monitors  Intra-op Monitoring Plan Comments:   Post Op Pain Control Plan: per primary service following discharge from PACU, IV/PO Opioids PRN and multimodal analgesia  Post Op Pain Control Plan Comments:   Induction:   IV  Beta Blocker:  Patient is not currently on a Beta-Blocker (No further documentation required).       Informed Consent: Patient representative understands risks and agrees with Anesthesia plan.  Questions answered. Anesthesia consent signed with patient representative.  ASA Score: 3     Day of Surgery Review of History & Physical:    H&P update referred to the provider.         Ready For Surgery From Anesthesia Perspective.

## 2019-05-13 NOTE — PT/OT/SLP PROGRESS
Speech Language Pathology  Discharge      Mariann Huff  MRN: [id number]    ST attempted to f/u for continued PMSV training this am.  Pt remains on ventilator x4 attempts. Size 8 trach remains in place.  During previous ST sessions with pt on trach collar, pt with limited success using PMSV with size 8 trach.  Please re-consult ST when pt medically appropriate to return to trach collar and consider down-sizing trach.    Education provided to pt and nursing regarding ST poc.      MAURICE Cortez, CCC-SLP  Speech Language Pathologist  [phone]  5/13/2019

## 2019-05-13 NOTE — PROGRESS NOTES
Subjective/Objective  Interval History/Significant Events:  Left IJ double lumen placed yesterday. Bloody BM overnight.     Follow-up For: Procedure(s) (LRB):  CREATION, TRACHEOSTOMY (N/A)  BRONCHOSCOPY (N/A)  EGD, WITH PEG TUBE INSERTION    Post-Operative Day: 10 Days Post-Op    Objective:     Vital Signs (Most Recent):  Temp: 99.7 °F (37.6 °C) (05/13/19 0702)  Pulse: 93 (05/13/19 0817)  Resp: 18 (05/13/19 0817)  BP: (!) 160/77 (05/13/19 0800)  SpO2: 100 % (05/13/19 0817) Vital Signs (24h Range):  Temp:  [98.8 °F (37.1 °C)-100.6 °F (38.1 °C)] 99.7 °F (37.6 °C)  Pulse:  [78-96] 93  Resp:  [0-38] 18  SpO2:  [100 %] 100 %  BP: (110-193)/(56-86) 160/77     Weight: 84 kg (185 lb 3 oz)  Body mass index is 30.82 kg/m².      Intake/Output Summary (Last 24 hours) at 5/13/2019 0840  Last data filed at 5/13/2019 0700  Gross per 24 hour   Intake 2032.2 ml   Output 420 ml   Net 1612.2 ml       Physical Exam   Constitutional: She appears well-developed and well-nourished.   HENT:   Head: Normocephalic and atraumatic.   Tracheostomy in place   Eyes: Right eye exhibits no discharge. Left eye exhibits no discharge.   Neck: Normal range of motion. Neck supple.   Cardiovascular: Normal rate and regular rhythm.   Pulmonary/Chest: Effort normal. No respiratory distress.   Abdominal:   Midline incision c/d/i.  PEG with no leak. Abdomen soft, non-distended.     Genitourinary:   Genitourinary Comments: Nephrostomy tube in place with watery dark yellow output.   Musculoskeletal: Normal range of motion.   Neurological:   Able to follow commands, denying pain.    Skin: Skin is warm and dry.   Vitals reviewed.      Vents:  Vent Mode: PAV+ (05/13/19 0817)  Ventilator Initiated: Yes (05/08/19 0630)  Set Rate: 18 bmp (05/13/19 0742)  Vt Set: 420 mL (05/13/19 0742)  Pressure Support: 0 cmH20 (04/29/19 1014)  PEEP/CPAP: 5 cmH20 (05/13/19 0817)  Oxygen Concentration (%): 42 (05/13/19 0817)  Peak Airway Pressure: 13 cmH2O (05/13/19 0817)  Plateau  Pressure: 0 cmH20 (05/13/19 0817)  Total Ve: 6.24 mL (05/13/19 0817)  Negative Inspiratory Force (cm H2O): -18 (04/27/19 1306)  F/VT Ratio<105 (RSBI): (!) 44.33 (05/13/19 0817)    Lines/Drains/Airways     Central Venous Catheter Line                 Percutaneous Central Line Insertion/Assessment - triple lumen  05/12/19 1256  less than 1 day          Drain                 Closed/Suction Drain 04/21/19 0300 LLQ 22 days         Nephrostomy 04/21/19 0629 Left 8 Fr. 22 days         Gastrostomy/Enterostomy 05/02/19 1134 Percutaneous endoscopic gastrostomy (PEG) LUQ decompression;feeding 10 days          Airway                 Surgical Airway 05/02/19 1107 Shiley Cuffed 10 days          Peripheral Intravenous Line                 Midline Catheter Insertion/Assessment  - Single Lumen 05/07/19 1632 Left basilic vein (medial side of arm) 18g x 8cm 5 days         Peripheral IV - Single Lumen 05/10/19 1020 22 G Right Upper Arm 2 days                Significant Labs:    CBC/Anemia Profile:  Recent Labs   Lab 05/12/19  0022 05/12/19  1347 05/13/19  0246   WBC 13.64* 13.56* 14.84*   HGB 8.3* 9.0* 8.3*   HCT 26.4* 28.3* 26.6*    381* 370*   MCV 90 93 93   RDW 15.1* 15.1* 15.3*        Chemistries:  Recent Labs   Lab 05/12/19  0359 05/13/19  0246    142   K 3.9 4.2    108   CO2 25 24   BUN 32* 40*   CREATININE 1.1 1.5*   CALCIUM 8.5* 8.7   ALBUMIN 1.6* 1.7*   PROT 5.9* 6.3   BILITOT 0.7 0.6   ALKPHOS 91 99   ALT 12 10   AST 15 18   MG 2.2 2.2   PHOS 2.8 2.2*       Significant Imaging:  I have reviewed all pertinent imaging results/findings within the past 24 hours.      Scheduled Meds:   ceFAZolin (ANCEF) IVPB  2 g Intravenous Q8H    chlorhexidine  15 mL Mouth/Throat BID    insulin aspart U-100  3 Units Subcutaneous Q24H    insulin aspart U-100  3 Units Subcutaneous Q24H    insulin aspart U-100  3 Units Subcutaneous Q24H    insulin aspart U-100  3 Units Subcutaneous Q24H    insulin aspart U-100  3 Units  Subcutaneous Q24H    insulin aspart U-100  3 Units Subcutaneous Q24H    miconazole nitrate 2%   Topical (Top) BID    pantoprazole  40 mg Intravenous Daily    rifAMpin (RIFADIN) IVPB  300 mg Intravenous Q12H     Continuous Infusions:   heparin (porcine) in D5W      lactated ringers 50 mL/hr at 05/09/19 1800     PRN Meds:sodium chloride, acetaminophen, albuterol-ipratropium, dextrose 50%, glucagon (human recombinant), hydrALAZINE, hydrOXYzine, insulin aspart U-100, labetalol, magnesium sulfate IVPB, magnesium sulfate IVPB, potassium chloride in water, promethazine (PHENERGAN) IVPB, sodium chloride 0.9%    Vital signs in last 24 hours:  Temp:  [98.8 °F (37.1 °C)-100.6 °F (38.1 °C)] 99.7 °F (37.6 °C)  Pulse:  [78-96] 93  Resp:  [0-38] 18  SpO2:  [100 %] 100 %  BP: (110-193)/(56-86) 160/77    Intake/Output last 3 shifts:  I/O last 3 completed shifts:  In: 2779.2 [I.V.:379.2; NG/GT:2400]  Out: 871 [Urine:825; Drains:46]  Intake/Output this shift:  No intake/output data recorded.    Problem Assessment/Plan  Cardiac/Vascular  CAD (coronary artery disease)  Assessment & Plan  ASSESSMENT/PLAN:   Mariann Huff is a 59 y.o. female s/p left ureteral injury on 4/12, who presented with fever, AMS, and intraabdominal abscess, taken for washout and ligation of left ureter with nephrostomy tube placement on 4/21. S/p Trach/PEG on 5/2. Episode of negative pressure pulmonary edema on 5/8 requiring mechanical ventilation, diuresis and low dose pressor.      Neuro:   - Pain control with fentanyl prn   - Sedation with prop prn     Pulmonary:   - Been tolerating trach collar since 5/3 until episode of flash pulmonary edema on 5/8  - Pulmonary edema likely secondary to occlusion and negative pressure pulmonary edema  - SpO2 % on ventilator  - CXR to follow edema  - ABGs prn  - Continue weaning vent as able     Cardiac:   - HDS at this time.  - TTE 5/8 with no evidence of right heart strain or significant valvular pathology,  normal LV systolic function, EF 70%     Renal:    - S/p transection of left ureter 4/12 and subsequent ligation and PCT nephrostomy tube placement with IR 4/21  - Renal function improving.    - Monitor I/Os  - NAC administered for renal protection on 5/8     Intake/Output Summary (Last 24 hours) at 5/13/2019 0843  Last data filed at 5/13/2019 0700  Gross per 24 hour   Intake 2032.2 ml   Output 420 ml   Net 1612.2 ml          ID:   - Blood cultures 4/12 +E. Coli; sensitivities pending. Repeat Bld Cx show NGTD.   - UCx from 4/20 growing E. Coli; sensitivities are now back. Repeat Cx pending.   - ID following for assistance with abx therapy, DC'd vanc and zosyn 5/12, started Ancef, continuing rifampin  - AF overnight  - WBC trending down     Hem/Onc:   - H/H stable  - Cont to monitor     Endocrine:  - DM Type 2 at baseline    - TF @ goal  - LDSSI  - Endocrine following for assistance with blood glucose control.      Fluids/Electrolytes/Nutrition/GI:   - Free water flushes 300 cc q6h  - Replace electrolytes PRN    # Shock Liver          - Resolved           - Labs continue to show improvement          - Elevated LFTs now normalized          - Thrombocytopenia - now normalized          - INR normalized to 0.9     # Lactic Acidosis          - Now resolved    # Bloody BM  - One bloody BM overnight, requiring repacking with surgicel  - CRS following, will likely need EUS     PPx:  - DVT: Lovenox, Hep gtt held for continued bloody BMs        DISPO:  - Continue SICU care  - Preparing for LTACH once bloody BMs are managed                Critical care was time spent personally by me on the following activities: development of treatment plan with patient or surrogate and bedside caregivers, discussions with consultants, evaluation of patient's response to treatment, examination of patient, ordering and performing treatments and interventions, ordering and review of laboratory studies, ordering and review of radiographic studies,  pulse oximetry, re-evaluation of patient's condition.  This critical care time did not overlap with that of any other provider or involve time for any procedures.         Zoë Du MD CA-1  Critical Care - Surgery

## 2019-05-13 NOTE — PT/OT/SLP PROGRESS
Physical Therapy Co- Treatment with OT    Patient Name:  Mariann Huff   MRN:  [id number]    Recommendations:     Discharge Recommendations:  rehabilitation facility   Discharge Equipment Recommendations: (will determine DME needs closer to discharge)   Barriers to discharge: Decreased caregiver support  will not be able to assist at current functional level.     Assessment:     Mariann Huff is a 59 y.o. female admitted with a medical diagnosis of Ureteral transection of left native kidney.  She presents with the following impairments/functional limitations:  weakness, gait instability, impaired balance, impaired endurance, impaired functional mobilty, decreased lower extremity function, impaired coordination pt kenyetta treatment fair and would benefit from cont skilled PT 4x/wk to progress physically. Pt will need inpt rehab when medically stable to maximize rehab potential. Pt is s/p exp lap, ligation L ureter 4/21, trach/PEG 5/2, back surgery 4/12 (previous admit) .    Rehab Prognosis: Fair; patient would benefit from acute skilled PT services to address these deficits and reach maximum level of function.    Recent Surgery: Procedure(s) (LRB):  CREATION, TRACHEOSTOMY (N/A)  BRONCHOSCOPY (N/A)  EGD, WITH PEG TUBE INSERTION 11 Days Post-Op    Plan:     During this hospitalization, patient to be seen 4 x/week to address the identified rehab impairments via gait training, therapeutic activities, therapeutic exercises and progress toward the following goals:    · Plan of Care Expires:  06/03/19    Subjective     Chief Complaint: pt c/o being dizzy with sitting on EOB.   Patient/Family Comments/goals:  To get better and go home.   Pain/Comfort:  · Pain Rating 1: 0/10  · Pain Rating Post-Intervention 1: 0/10      Objective:     Communicated with nurse prior to session.  Patient found supine with telemetry, blood pressure cuff, pulse ox (continuous), tracheostomy, ventilator, PureWick, MADHURI drain(CVP, L IJ dialysis  catheter, t-tube) upon PT entry to room.     General Precautions: Standard, fall   Orthopedic Precautions:    Braces: LSO     Functional Mobility:  · Bed Mobility:   Pt needed verbal cues for hand placement and sequencing for functional mobility.   · Rolling Right: total assistance and of 2 persons  · Supine to Sit: total assistance and of 2 persons  · Sit to Supine: total assistance and of 2 persons  ·   · Transfers:     · Sit to Stand:  total assistance and of 2 persons with hand-held assist. Pt was not able to come to upright posture with standing.   ·   · Balance: pt sat on EOB x 5 min with total assist. pt leaned backwards with sitting.       AM-PAC 6 CLICK MOBILITY  Turning over in bed (including adjusting bedclothes, sheets and blankets)?: 2  Sitting down on and standing up from a chair with arms (e.g., wheelchair, bedside commode, etc.): 2  Moving from lying on back to sitting on the side of the bed?: 2  Moving to and from a bed to a chair (including a wheelchair)?: 1  Need to walk in hospital room?: 1  Climbing 3-5 steps with a railing?: 1  Basic Mobility Total Score: 9         Patient left supine with bed in chair position..    GOALS:   Multidisciplinary Problems     Physical Therapy Goals        Problem: Physical Therapy Goal    Goal Priority Disciplines Outcome Goal Variances Interventions   Physical Therapy Goal     PT, PT/OT      Description:  Goals to be met by: 19    Patient will increase functional independence with mobility by performin. Supine to sit with Moderate Assistance -not met  2. Sit to stand transfer with Minimal Assistance -not met  3. Gait  x 30 feet with Contact Guard Assistance using Rolling Walker. -not met  4. Lower extremity exercise program x15 reps , with assistance as needed-not met                      Time Tracking:     PT Received On: 19  PT Start Time: 930     PT Stop Time: 953  PT Total Time (min): 23 min     Billable Minutes: Therapeutic Activity 23  min    Treatment Type: Other (see comments)(co-treatment with OT)  PT/PTA: PT     PTA Visit Number: 0     Cathy Taylor, PT  05/13/2019

## 2019-05-13 NOTE — PROGRESS NOTES
Called MD to bedside to view ondina-red bleeding from rectum upon turning patient and changing sheets.  MD at bedside and has advised additional surgi-seal placement at this time; done and pt tolerated this well. Updated CBC results pending at this time.  Will continue to monitor.

## 2019-05-13 NOTE — ASSESSMENT & PLAN NOTE
Elizaamanda Huff is a 59 y.o. female s/p left ureteral injury on 4/12, presented with fever, AMS, and intraabdominal abscess, taken for washout and ligation of left ureter with neph tube placement on 4/21, Trach/PEG 5/2.    - Tube feeds per SICU  - Ancef, Rifampin per ID recs   - BAL on 5/8 showed no organisms   - afebrile overnight, WBC was 14.8  - Maintain neph tube, and MADHURI drain, Fontenot removed  - Urine output adequate, creatinine up to 1.5  - back on heparin gtt  - diuresis/fluids per SICU    Dispo: continue ICU care

## 2019-05-13 NOTE — PROGRESS NOTES
[facility]  Urology  Progress Note    Patient Name: Mariann Huff  MRN: [id number]  Admission Date: 4/20/2019  Hospital Length of Stay: 23 days  Code Status: Full Code   Attending Provider: Robin Boyd MD   Primary Care Physician: Jasbir Haney MD    Subjective:     HPI:  Mariann Huff is a 59 y.o. female with history of HTN, type 2 diabetes mellitus, CAD, NSTEMI, and back pain who presents to [facility] with altered mental status and sepsis. She underwent L5-S1 OLIF with NSGY on 4/12 and had intraoperative left ureteral injury with ureteroureteral anastomosis and ureteral stent placement. She did well initially and had correa and MADHURI drain removed on 4/16. She began having chills and altered mental status 2 days ago and this has progressively worsened. No complaints of pain.     She is altered and HPI is limited. In the ED she is febrile to 103 and tachycardic and pressures are low normal. WBC is 5, creatinine is 3.6 baseline 1.0, lactate is 4.6. Cath UA concerning for infection, 3+ LE, >100 WBCs, and many bacteria on microscopy.    CT and MRI abdomen both show air with minimal fluid near the surgical site with left ureter coursing through. There is air throughout the proximal collecting system which is decompressed with JJ ureteral stent in good position.    Taken for ex lap, ligation of left ureter and left neph tube placement on 4/21/19.    Interval History:   Tmax 100.6  Bloody BMs overnight controlled with quick clot  Bilateral UE DVTs    Review of Systems    Objective:     Temp:  [98.8 °F (37.1 °C)-100.6 °F (38.1 °C)] 99.7 °F (37.6 °C)  Pulse:  [] 86  Resp:  [0-38] 18  SpO2:  [100 %] 100 %  BP: (110-193)/(56-86) 138/66     Body mass index is 30.82 kg/m².      Bladder Scan Volume (mL): 27 mL (05/10/19 0846)  Post Void Cath Residual Output (mL): 27 mL (05/10/19 0846)    Drains     Drain                 Closed/Suction Drain 04/21/19 0300 LLQ 18 days         Nephrostomy 04/21/19 0629 Left 8 Fr.  18 days         Urethral Catheter 04/20/19 1711 Latex 16 Fr. 18 days         Gastrostomy/Enterostomy 05/02/19 1134 Percutaneous endoscopic gastrostomy (PEG) LUQ decompression;feeding 6 days                Physical Exam   Constitutional: She appears well-developed. No distress.   HENT:   Head: Normocephalic and atraumatic.   Neck: No JVD present.   Cardiovascular: Normal rate and regular rhythm.    Pulmonary/Chest:   On ventilator   Abdominal: Soft. She exhibits no distension. There is no rebound and no guarding.   Incision c/d/i   MADHURI drain with ss output  G-tube   Genitourinary:   Genitourinary Comments: Fontenot removed  Left neph tube with clear yellow urine   Neurological: She is alert.   Skin: Skin is warm and dry. She is not diaphoretic. No pallor.         Significant Labs:    BMP:  Recent Labs   Lab 05/11/19  0433 05/12/19  0359 05/13/19  0246    144 142   K 4.3 3.9 4.2    110 108   CO2 26 25 24   BUN 31* 32* 40*   CREATININE 1.2 1.1 1.5*   CALCIUM 8.5* 8.5* 8.7       CBC:   Recent Labs   Lab 05/12/19  0022 05/12/19  1347 05/13/19  0246   WBC 13.64* 13.56* 14.84*   HGB 8.3* 9.0* 8.3*   HCT 26.4* 28.3* 26.6*    381* 370*       Significant Imaging:  All pertinent imaging results/findings from the past 24 hours have been reviewed.                  Assessment/Plan:     * Ureteral transection of left native kidney  Orantoinette Huff is a 59 y.o. female s/p left ureteral injury on 4/12, presented with fever, AMS, and intraabdominal abscess, taken for washout and ligation of left ureter with neph tube placement on 4/21, Trach/PEG 5/2.    - Tube feeds per SICU  - Ancef, Rifampin per ID recs   - BAL on 5/8 showed no organisms   - afebrile overnight, WBC was 14.8  - Maintain neph tube, and MADHURI drain, Fontenot removed  - Urine output adequate, creatinine up to 1.5  - back on heparin gtt  - diuresis/fluids per SICU    Dispo: continue ICU care    Sepsis  As above        VTE Risk Mitigation (From admission,  onward)        Ordered     heparin 25,000 units in dextrose 5% 250 ml (100 units/mL) infusion MINIMAL INTENSITY nomogram - [facility]  Continuous      05/12/19 0914     IP VTE LOW RISK PATIENT  Once      05/08/19 1315     Place sequential compression device  Until discontinued      04/20/19 4307          Hugh Higuera MD  Urology  [facility]

## 2019-05-13 NOTE — TRANSFER OF CARE
"Anesthesia Transfer of Care Note    Patient: Mariann Huff    Procedure(s) Performed: * No procedures listed *    Patient location: ICU    Anesthesia Type: general    Transport from OR: Upon arrival to PACU/ICU, patient attached to ventilator and auscultated to confirm bilateral breath sounds and adequate TV    Post pain: adequate analgesia    Post assessment: no apparent anesthetic complications    Post vital signs: stable    Level of consciousness: responds to stimulation    Nausea/Vomiting: no nausea/vomiting    Complications: none    Transfer of care protocol was followedComments: Pt. Remains in ICU on all monitors. Spont breathing through trach connected to vent .40/+5. VSS. Report to RN.      Last vitals:   Visit Vitals  BP (!) 153/73 (BP Location: Right arm, Patient Position: Lying)   Pulse 99   Temp 36.9 °C (98.5 °F) (Oral)   Resp (!) 22   Ht 5' 5" (1.651 m)   Wt 84 kg (185 lb 3 oz)   SpO2 100%   Breastfeeding? No   BMI 30.82 kg/m²     "

## 2019-05-13 NOTE — PT/OT/SLP PROGRESS
Occupational Therapy   Treatment    Name: Mariann Huff  MRN: [id number]  Admitting Diagnosis:  Ureteral transection of left native kidney  Day of Surgery    Recommendations:     Discharge Recommendations: rehabilitation facility  Discharge Equipment Recommendations:  (TBD closer to d/c. )  Barriers to discharge:  Other (Comment)(Pt requires increased assistance at current functional level )    Assessment:     Mariann Huff is a 59 y.o. female with a medical diagnosis of Ureteral transection of left native kidney.  She presents with performance deficits affecting function are weakness, impaired endurance, impaired self care skills, impaired functional mobilty, gait instability, impaired balance, decreased lower extremity function, impaired cardiopulmonary response to activity, decreased coordination, impaired coordination. Pt will continue to benefit from skilled OT in order to address the previously stated deficits.     Rehab Prognosis:  Good; patient would benefit from acute skilled OT services to address these deficits and reach maximum level of function.       Plan:     Patient to be seen 4 x/week to address the above listed problems via self-care/home management, therapeutic activities, therapeutic exercises  · Plan of Care Expires: 06/05/19  · Plan of Care Reviewed with: patient    Subjective     Pain/Comfort:  · Pain Rating 1: 0/10  · Pain Rating Post-Intervention 1: 0/10    Objective:     Communicated with: RN prior to session.  Patient found HOB elevated with oxygen, nephrostomy, ventilator, telemetry, PEG Tube, blood pressure cuff, pulse ox (continuous), MADHURI drain, tracheostomy, PureWick(L IJ dialysis catheter, CVP) upon OT entry to room. Pt agreeable to therapy session.     General Precautions: Standard, fall   Orthopedic Precautions:N/A   Braces: N/A, LSO     Occupational Performance:     Bed Mobility:    · Patient completed Rolling/Turning to Right with total assistance and 2 persons  · Patient  completed Scooting/Bridging with total assistance, 2 persons and via drawsheet towards HOB   · Patient completed Supine to Sit with total assistance and 2 persons  · Patient completed Sit to Supine with total assistance and 2 persons     Functional Mobility/Transfers:  · Patient completed Sit <> Stand Transfer from EOB with total assistance and of 2 persons  with  B hand-held assist   · Pt was unable to achieve full upright position with increased forward flexion and posterior lean  · required max A for B LE positioning.   · Functional Mobility: not performed    Activities of Daily Living:  · Grooming: minimum assistance     · Pt performed simulated oral care task using toothette while sitting EOB with total A for sitting balance and min A for oral care.   · Pt washed face with set-up A and handing pt wash cloth while sitting EOB with total A for sitting balance.       [facility] 6 Click ADL: 9    Treatment & Education:  Pt educated by therapist on:   - Pt educated on role of OT, POC, and goals for therapy.    - Educated pt on being appropriate to transfer with nsg and PCT via drawsheet to medichair   - Pt tolerated sitting EOB for ~5 mins with total A with posterior lean requiring facilitation to bring self forward.   - Importance of OOB ax's with staff member assistance and sitting OOB majority of day.   - Pt completed ADLs and functional mobility for treatment session as noted above    - Pt verbalized understanding. Pt expressed no further concerns/questions.  - whiteboard updated     Patient left with bed in chair position with all lines intact, call button in reach and RN presentEducation:      GOALS:   Multidisciplinary Problems     Occupational Therapy Goals        Problem: Occupational Therapy Goal    Goal Priority Disciplines Outcome Interventions   Occupational Therapy Goal     OT, PT/OT Ongoing (interventions implemented as appropriate)    Description:  Goals to be met by: 5/20/19     Patient will increase  functional independence with ADLs by performing:    UE Dressing with Set-up Assistance.  LE Dressing with Maximum Assistance and Assistive Devices as needed.  Grooming while standing with Minimal Assistance.  Toileting from bedside commode with Minimal Assistance for hygiene and clothing management.   Sitting at edge of bed x10 minutes with Supervision.  Rolling to Bilateral with Minimal Assistance.   Supine to sit with Minimal Assistance.  Toilet transfer to bedside commode with Minimal Assistance.                      Time Tracking:     OT Date of Treatment: 05/13/19  OT Start Time: 0926  OT Stop Time: 0949  OT Total Time (min): 23 min    Billable Minutes:Self Care/Home Management 23    Shania Harding OT  5/13/2019

## 2019-05-13 NOTE — PROVATION PATIENT INSTRUCTIONS
Discharge Summary/Instructions after an Endoscopic Procedure  Patient Name: Mariann Huff  Patient MRN: [id number]  Patient YOB: 1960  Monday, May 13, 2019  Francisco Amin MD  RESTRICTIONS:  During your procedure today, you received medications for sedation.  These   medications may affect your judgment, balance and coordination.  Therefore,   for 24 hours, you have the following restrictions:   - DO NOT drive a car, operate machinery, make legal/financial decisions,   sign important papers or drink alcohol.    ACTIVITY:  Today: no heavy lifting, straining or running due to procedural   sedation/anesthesia.  The following day: return to full activity including work.  DIET:  Eat and drink normally unless instructed otherwise.     TREATMENT FOR COMMON SIDE EFFECTS:  - Mild abdominal pain, nausea, belching, bloating or excessive gas:  rest,   eat lightly and use a heating pad.  - Sore Throat: treat with throat lozenges and/or gargle with warm salt   water.  - Because air was used during the procedure, expelling large amounts of air   from your rectum or belching is normal.  - If a bowel prep was taken, you may not have a bowel movement for 1-3 days.    This is normal.  SYMPTOMS TO WATCH FOR AND REPORT TO YOUR PHYSICIAN:  1. Abdominal pain or bloating, other than gas cramps.  2. Chest pain.  3. Back pain.  4. Signs of infection such as: chills or fever occurring within 24 hours   after the procedure.  5. Rectal bleeding, which would show as bright red, maroon, or black stools.   (A tablespoon of blood from the rectum is not serious, especially if   hemorrhoids are present.)  6. Vomiting.  7. Weakness or dizziness.  GO DIRECTLY TO THE NEAREST EMERGENCY ROOM IF YOU HAVE ANY OF THE FOLLOWING:      Difficulty breathing              Chills and/or fever over 101 F   Persistent vomiting and/or vomiting blood   Severe abdominal pain   Severe chest pain   Black, tarry stools   Bleeding- more than one tablespoon   Any  other symptom or condition that you feel may need urgent attention  Your doctor recommends these additional instructions:  If any biopsies were taken, your doctors clinic will contact you in 1 to 2   weeks with any results.  - Resume previous diet.   - Resume Lovenox (enoxaparin) at prior dose in 2 days.  Refer to managing   physician for further adjustment of therapy.   - Return patient to ICU for ongoing care.  For questions, problems or results please call your physician - Francisco Amin MD at Work:  [phone].  [facility], EMERGENCY ROOM PHONE NUMBER: [phone]  IF A COMPLICATION OR EMERGENCY SITUATION ARISES AND YOU ARE UNABLE TO REACH   YOUR PHYSICIAN - GO DIRECTLY TO THE EMERGENCY ROOM.  Francisco Amin MD  5/13/2019 2:59:43 PM  This report has been verified and signed electronically.  [employer]

## 2019-05-13 NOTE — ASSESSMENT & PLAN NOTE
Ms. Huff is a 60 yo F with PMH of HTN, DM II, CAD, NSTEMI, s/p L5-S1 OLIF with NSGY 4/12 c/b intraoperative left ureteral injury and underwent ureteroureteral anastomoses and ureteral stent placement complicated by sepsis due to E.coli septicemia now s/p ex lap on 4/21 and PEG/Trach of the vent now having BRBPR.     Cont quickclot packing as needed  Will plan for flex sig today to evaluate ulcer  Please make NPO, cont to hold hep gtt  Will perform procedure at bedside  Please call with any questions or concerns

## 2019-05-13 NOTE — SUBJECTIVE & OBJECTIVE
Subjective:     Interval History: BRBPR overnight, quickclot placed and has not had any bleeding since, hep gtt has been off since yesterday evening.     Post-Op Info:  Procedure(s) (LRB):  CREATION, TRACHEOSTOMY (N/A)  BRONCHOSCOPY (N/A)  EGD, WITH PEG TUBE INSERTION   11 Days Post-Op      Medications:  Continuous Infusions:   heparin (porcine) in D5W      lactated ringers 50 mL/hr at 05/09/19 1800     Scheduled Meds:   ceFAZolin (ANCEF) IVPB  2 g Intravenous Q8H    chlorhexidine  15 mL Mouth/Throat BID    insulin aspart U-100  3 Units Subcutaneous Q24H    insulin aspart U-100  3 Units Subcutaneous Q24H    insulin aspart U-100  3 Units Subcutaneous Q24H    insulin aspart U-100  3 Units Subcutaneous Q24H    insulin aspart U-100  3 Units Subcutaneous Q24H    insulin aspart U-100  3 Units Subcutaneous Q24H    miconazole nitrate 2%   Topical (Top) BID    pantoprazole  40 mg Intravenous Daily    rifAMpin (RIFADIN) IVPB  300 mg Intravenous Q12H     PRN Meds:   sodium chloride    acetaminophen    albuterol-ipratropium    dextrose 50%    glucagon (human recombinant)    hydrALAZINE    hydrOXYzine    insulin aspart U-100    labetalol    magnesium sulfate IVPB    magnesium sulfate IVPB    potassium chloride in water    promethazine (PHENERGAN) IVPB    sodium chloride 0.9%        Objective:     Vital Signs (Most Recent):  Temp: 99.7 °F (37.6 °C) (05/13/19 0300)  Pulse: 92 (05/13/19 0615)  Resp: 18 (05/13/19 0615)  BP: (!) 142/70 (05/13/19 0600)  SpO2: 100 % (05/13/19 0615) Vital Signs (24h Range):  Temp:  [98.8 °F (37.1 °C)-100.6 °F (38.1 °C)] 99.7 °F (37.6 °C)  Pulse:  [] 92  Resp:  [0-38] 18  SpO2:  [100 %] 100 %  BP: (110-193)/(56-86) 142/70     Intake/Output - Last 3 Shifts       05/11 0700 - 05/12 0659 05/12 0700 - 05/13 0659    I.V. (mL/kg) 709 (9.2) 152.2 (1.8)    NG/ 1920    Total Intake(mL/kg) 1509 (19.6) 2072.2 (24.7)    Urine (mL/kg/hr) 725 (0.4) 450 (0.2)    Drains 6 30     Other  0    Stool 0 0    Total Output 731 480    Net +778 +1592.2          Urine Occurrence  1 x    Stool Occurrence 3 x 3 x          Physical Exam   Constitutional: No distress.   Eyes: EOM are normal.   Neck: Neck supple.   Cardiovascular: Normal rate and regular rhythm.   Pulmonary/Chest: Effort normal. No respiratory distress.   Abdominal: Soft. She exhibits no distension. There is no tenderness.   Musculoskeletal: Normal range of motion.   Neurological: She is alert.   Skin: Skin is warm and dry.       Significant Labs:  BMP (Last 3 Results):   Recent Labs   Lab 05/11/19  0433 05/12/19  0359 05/13/19  0246   * 162* 169*    144 142   K 4.3 3.9 4.2    110 108   CO2 26 25 24   BUN 31* 32* 40*   CREATININE 1.2 1.1 1.5*   CALCIUM 8.5* 8.5* 8.7   MG 2.2 2.2 2.2     CBC (Last 3 Results):   Recent Labs   Lab 05/12/19  0022 05/12/19  1347 05/13/19  0246   WBC 13.64* 13.56* 14.84*   RBC 2.92* 3.06* 2.87*   HGB 8.3* 9.0* 8.3*   HCT 26.4* 28.3* 26.6*    381* 370*   MCV 90 93 93   MCH 28.4 29.4 28.9   MCHC 31.4* 31.8* 31.2*       Significant Diagnostics:  None

## 2019-05-13 NOTE — ASSESSMENT & PLAN NOTE
ASSESSMENT/PLAN:   Mariann Huff is a 59 y.o. female s/p left ureteral injury on 4/12, who presented with fever, AMS, and intraabdominal abscess, taken for washout and ligation of left ureter with nephrostomy tube placement on 4/21. S/p Trach/PEG on 5/2. Episode of negative pressure pulmonary edema on 5/8 requiring mechanical ventilation, diuresis and low dose pressor.      Neuro:   - Pain control with fentanyl prn   - Sedation with prop prn     Pulmonary:   - Been tolerating trach collar since 5/3 until episode of flash pulmonary edema on 5/8  - Pulmonary edema likely secondary to occlusion and negative pressure pulmonary edema  - SpO2 % on ventilator  - CXR to follow edema  - ABGs prn  - Continue weaning vent as able     Cardiac:   - HDS at this time.  - TTE 5/8 with no evidence of right heart strain or significant valvular pathology, normal LV systolic function, EF 70%     Renal:    - S/p transection of left ureter 4/12 and subsequent ligation and PCT nephrostomy tube placement with IR 4/21  - Renal function improving.    - Monitor I/Os  - NAC administered for renal protection on 5/8     Intake/Output Summary (Last 24 hours) at 5/13/2019 0843  Last data filed at 5/13/2019 0700  Gross per 24 hour   Intake 2032.2 ml   Output 420 ml   Net 1612.2 ml          ID:   - Blood cultures 4/12 +E. Coli; sensitivities pending. Repeat Bld Cx show NGTD.   - UCx from 4/20 growing E. Coli; sensitivities are now back. Repeat Cx pending.   - ID following for assistance with abx therapy, DC'd vanc and zosyn 5/12, started Ancef, continuing rifampin  - AF overnight  - WBC trending down     Hem/Onc:   - H/H stable  - Cont to monitor     Endocrine:  - DM Type 2 at baseline    - TF @ goal  - LDSSI  - Endocrine following for assistance with blood glucose control.      Fluids/Electrolytes/Nutrition/GI:   - Free water flushes 300 cc q6h  - Replace electrolytes PRN    # Shock Liver          - Resolved           - Labs continue to  show improvement          - Elevated LFTs now normalized          - Thrombocytopenia - now normalized          - INR normalized to 0.9     # Lactic Acidosis          - Now resolved    # Bloody BM  - One bloody BM overnight, requiring repacking with surgicel  - CRS following, will likely need EUS     PPx:  - DVT: Lovenox, Hep gtt held for continued bloody BMs        DISPO:  - Continue SICU care  - Preparing for LTACH once bloody BMs are managed                Critical care was time spent personally by me on the following activities: development of treatment plan with patient or surrogate and bedside caregivers, discussions with consultants, evaluation of patient's response to treatment, examination of patient, ordering and performing treatments and interventions, ordering and review of laboratory studies, ordering and review of radiographic studies, pulse oximetry, re-evaluation of patient's condition.  This critical care time did not overlap with that of any other provider or involve time for any procedures.         Zoë Du MD CA-1  Critical Care - Surgery

## 2019-05-13 NOTE — PROGRESS NOTES
[facility]  Colorectal Surgery  Progress Note    Patient Name: Mariann Huff  MRN: [id number]  Admission Date: 4/20/2019  Hospital Length of Stay: 23 days  Attending Physician: Robin Boyd MD    Subjective:     Interval History: BRBPR overnight, quickclot placed and has not had any bleeding since, hep gtt has been off since yesterday evening.     Post-Op Info:  Procedure(s) (LRB):  CREATION, TRACHEOSTOMY (N/A)  BRONCHOSCOPY (N/A)  EGD, WITH PEG TUBE INSERTION   11 Days Post-Op      Medications:  Continuous Infusions:   heparin (porcine) in D5W      lactated ringers 50 mL/hr at 05/09/19 1800     Scheduled Meds:   ceFAZolin (ANCEF) IVPB  2 g Intravenous Q8H    chlorhexidine  15 mL Mouth/Throat BID    insulin aspart U-100  3 Units Subcutaneous Q24H    insulin aspart U-100  3 Units Subcutaneous Q24H    insulin aspart U-100  3 Units Subcutaneous Q24H    insulin aspart U-100  3 Units Subcutaneous Q24H    insulin aspart U-100  3 Units Subcutaneous Q24H    insulin aspart U-100  3 Units Subcutaneous Q24H    miconazole nitrate 2%   Topical (Top) BID    pantoprazole  40 mg Intravenous Daily    rifAMpin (RIFADIN) IVPB  300 mg Intravenous Q12H     PRN Meds:   sodium chloride    acetaminophen    albuterol-ipratropium    dextrose 50%    glucagon (human recombinant)    hydrALAZINE    hydrOXYzine    insulin aspart U-100    labetalol    magnesium sulfate IVPB    magnesium sulfate IVPB    potassium chloride in water    promethazine (PHENERGAN) IVPB    sodium chloride 0.9%        Objective:     Vital Signs (Most Recent):  Temp: 99.7 °F (37.6 °C) (05/13/19 0300)  Pulse: 92 (05/13/19 0615)  Resp: 18 (05/13/19 0615)  BP: (!) 142/70 (05/13/19 0600)  SpO2: 100 % (05/13/19 0615) Vital Signs (24h Range):  Temp:  [98.8 °F (37.1 °C)-100.6 °F (38.1 °C)] 99.7 °F (37.6 °C)  Pulse:  [] 92  Resp:  [0-38] 18  SpO2:  [100 %] 100 %  BP: (110-193)/(56-86) 142/70     Intake/Output - Last 3 Shifts        05/11 0700 - 05/12 0659 05/12 0700 - 05/13 0659    I.V. (mL/kg) 709 (9.2) 152.2 (1.8)    NG/ 1920    Total Intake(mL/kg) 1509 (19.6) 2072.2 (24.7)    Urine (mL/kg/hr) 725 (0.4) 450 (0.2)    Drains 6 30    Other  0    Stool 0 0    Total Output 731 480    Net +778 +1592.2          Urine Occurrence  1 x    Stool Occurrence 3 x 3 x          Physical Exam   Constitutional: No distress.   Eyes: EOM are normal.   Neck: Neck supple.   Cardiovascular: Normal rate and regular rhythm.   Pulmonary/Chest: Effort normal. No respiratory distress.   Abdominal: Soft. She exhibits no distension. There is no tenderness.   Musculoskeletal: Normal range of motion.   Neurological: She is alert.   Skin: Skin is warm and dry.       Significant Labs:  BMP (Last 3 Results):   Recent Labs   Lab 05/11/19  0433 05/12/19  0359 05/13/19  0246   * 162* 169*    144 142   K 4.3 3.9 4.2    110 108   CO2 26 25 24   BUN 31* 32* 40*   CREATININE 1.2 1.1 1.5*   CALCIUM 8.5* 8.5* 8.7   MG 2.2 2.2 2.2     CBC (Last 3 Results):   Recent Labs   Lab 05/12/19  0022 05/12/19  1347 05/13/19  0246   WBC 13.64* 13.56* 14.84*   RBC 2.92* 3.06* 2.87*   HGB 8.3* 9.0* 8.3*   HCT 26.4* 28.3* 26.6*    381* 370*   MCV 90 93 93   MCH 28.4 29.4 28.9   MCHC 31.4* 31.8* 31.2*       Significant Diagnostics:  None    Assessment/Plan:     BRBPR (bright red blood per rectum)  Ms. Huff is a 60 yo F with PMH of HTN, DM II, CAD, NSTEMI, s/p L5-S1 OLIF with NSGY 4/12 c/b intraoperative left ureteral injury and underwent ureteroureteral anastomoses and ureteral stent placement complicated by sepsis due to E.coli septicemia now s/p ex lap on 4/21 and PEG/Trach of the vent now having BRBPR.     Cont quickclot packing as needed  Will plan for flex sig today to evaluate ulcer  Please make NPO, cont to hold hep gtt  Will perform procedure at bedside  Please call with any questions or concerns               John Barker MD  Colorectal  Surgery  [facility]

## 2019-05-13 NOTE — SUBJECTIVE & OBJECTIVE
Interval History:   Tmax 100.6  Bloody BMs overnight controlled with quick clot  Bilateral UE DVTs    Review of Systems    Objective:     Temp:  [98.8 °F (37.1 °C)-100.6 °F (38.1 °C)] 99.7 °F (37.6 °C)  Pulse:  [] 86  Resp:  [0-38] 18  SpO2:  [100 %] 100 %  BP: (110-193)/(56-86) 138/66     Body mass index is 30.82 kg/m².      Bladder Scan Volume (mL): 27 mL (05/10/19 0846)  Post Void Cath Residual Output (mL): 27 mL (05/10/19 0846)    Drains     Drain                 Closed/Suction Drain 04/21/19 0300 LLQ 18 days         Nephrostomy 04/21/19 0629 Left 8 Fr. 18 days         Urethral Catheter 04/20/19 1711 Latex 16 Fr. 18 days         Gastrostomy/Enterostomy 05/02/19 1134 Percutaneous endoscopic gastrostomy (PEG) LUQ decompression;feeding 6 days                Physical Exam   Constitutional: She appears well-developed. No distress.   HENT:   Head: Normocephalic and atraumatic.   Neck: No JVD present.   Cardiovascular: Normal rate and regular rhythm.    Pulmonary/Chest:   On ventilator   Abdominal: Soft. She exhibits no distension. There is no rebound and no guarding.   Incision c/d/i   MADHURI drain with ss output  G-tube   Genitourinary:   Genitourinary Comments: Fontenot removed  Left neph tube with clear yellow urine   Neurological: She is alert.   Skin: Skin is warm and dry. She is not diaphoretic. No pallor.         Significant Labs:    BMP:  Recent Labs   Lab 05/11/19  0433 05/12/19  0359 05/13/19  0246    144 142   K 4.3 3.9 4.2    110 108   CO2 26 25 24   BUN 31* 32* 40*   CREATININE 1.2 1.1 1.5*   CALCIUM 8.5* 8.5* 8.7       CBC:   Recent Labs   Lab 05/12/19  0022 05/12/19  1347 05/13/19  0246   WBC 13.64* 13.56* 14.84*   HGB 8.3* 9.0* 8.3*   HCT 26.4* 28.3* 26.6*    381* 370*       Significant Imaging:  All pertinent imaging results/findings from the past 24 hours have been reviewed.

## 2019-05-13 NOTE — CARE UPDATE
BG goal 140 - 180     BG well controlled on current SQ insulin regimen.     Continue:   Novolog 3 units q 4 hrs -- Hold if TFs are stopped.  Low dose correction scale   BG monitoring q 4 hrs.      ** Please notify Endocrine for any change and/or advance in diet/TF**    ** Please call Endocrine for any BG related issues **

## 2019-05-14 LAB
ALBUMIN SERPL BCP-MCNC: 1.6 G/DL (ref 3.5–5.2)
ALP SERPL-CCNC: 100 U/L (ref 55–135)
ALT SERPL W/O P-5'-P-CCNC: 6 U/L (ref 10–44)
ANION GAP SERPL CALC-SCNC: 8 MMOL/L (ref 8–16)
APTT BLDCRRT: 24.4 SEC (ref 21–32)
AST SERPL-CCNC: 16 U/L (ref 10–40)
BASOPHILS # BLD AUTO: 0.07 K/UL (ref 0–0.2)
BASOPHILS # BLD AUTO: 0.09 K/UL (ref 0–0.2)
BASOPHILS # BLD AUTO: 0.1 K/UL (ref 0–0.2)
BASOPHILS # BLD AUTO: 0.1 K/UL (ref 0–0.2)
BASOPHILS NFR BLD: 0.4 % (ref 0–1.9)
BASOPHILS NFR BLD: 0.5 % (ref 0–1.9)
BASOPHILS NFR BLD: 0.5 % (ref 0–1.9)
BASOPHILS NFR BLD: 0.6 % (ref 0–1.9)
BILIRUB SERPL-MCNC: 0.6 MG/DL (ref 0.1–1)
BLD PROD TYP BPU: NORMAL
BLD PROD TYP BPU: NORMAL
BLOOD UNIT EXPIRATION DATE: NORMAL
BLOOD UNIT EXPIRATION DATE: NORMAL
BLOOD UNIT TYPE CODE: 5100
BLOOD UNIT TYPE CODE: 5100
BLOOD UNIT TYPE: NORMAL
BLOOD UNIT TYPE: NORMAL
BUN SERPL-MCNC: 41 MG/DL (ref 6–20)
CALCIUM SERPL-MCNC: 8.4 MG/DL (ref 8.7–10.5)
CHLORIDE SERPL-SCNC: 111 MMOL/L (ref 95–110)
CO2 SERPL-SCNC: 24 MMOL/L (ref 23–29)
CODING SYSTEM: NORMAL
CODING SYSTEM: NORMAL
CREAT SERPL-MCNC: 1.3 MG/DL (ref 0.5–1.4)
DIFFERENTIAL METHOD: ABNORMAL
DISPENSE STATUS: NORMAL
DISPENSE STATUS: NORMAL
EOSINOPHIL # BLD AUTO: 0.6 K/UL (ref 0–0.5)
EOSINOPHIL # BLD AUTO: 0.6 K/UL (ref 0–0.5)
EOSINOPHIL # BLD AUTO: 0.7 K/UL (ref 0–0.5)
EOSINOPHIL # BLD AUTO: 0.7 K/UL (ref 0–0.5)
EOSINOPHIL NFR BLD: 3.1 % (ref 0–8)
EOSINOPHIL NFR BLD: 3.6 % (ref 0–8)
EOSINOPHIL NFR BLD: 3.7 % (ref 0–8)
EOSINOPHIL NFR BLD: 4.3 % (ref 0–8)
ERYTHROCYTE [DISTWIDTH] IN BLOOD BY AUTOMATED COUNT: 15.3 % (ref 11.5–14.5)
ERYTHROCYTE [DISTWIDTH] IN BLOOD BY AUTOMATED COUNT: 15.4 % (ref 11.5–14.5)
ERYTHROCYTE [DISTWIDTH] IN BLOOD BY AUTOMATED COUNT: 15.6 % (ref 11.5–14.5)
ERYTHROCYTE [DISTWIDTH] IN BLOOD BY AUTOMATED COUNT: 15.8 % (ref 11.5–14.5)
EST. GFR  (AFRICAN AMERICAN): 52.3 ML/MIN/1.73 M^2
EST. GFR  (NON AFRICAN AMERICAN): 45.3 ML/MIN/1.73 M^2
GLUCOSE SERPL-MCNC: 172 MG/DL (ref 70–110)
HCT VFR BLD AUTO: 22.1 % (ref 37–48.5)
HCT VFR BLD AUTO: 30.6 % (ref 37–48.5)
HCT VFR BLD AUTO: 30.8 % (ref 37–48.5)
HCT VFR BLD AUTO: 31.1 % (ref 37–48.5)
HGB BLD-MCNC: 10 G/DL (ref 12–16)
HGB BLD-MCNC: 10.1 G/DL (ref 12–16)
HGB BLD-MCNC: 6.8 G/DL (ref 12–16)
HGB BLD-MCNC: 9.8 G/DL (ref 12–16)
IMM GRANULOCYTES # BLD AUTO: 0.1 K/UL (ref 0–0.04)
IMM GRANULOCYTES # BLD AUTO: 0.11 K/UL (ref 0–0.04)
IMM GRANULOCYTES # BLD AUTO: 0.13 K/UL (ref 0–0.04)
IMM GRANULOCYTES # BLD AUTO: 0.13 K/UL (ref 0–0.04)
IMM GRANULOCYTES NFR BLD AUTO: 0.6 % (ref 0–0.5)
IMM GRANULOCYTES NFR BLD AUTO: 0.6 % (ref 0–0.5)
IMM GRANULOCYTES NFR BLD AUTO: 0.7 % (ref 0–0.5)
IMM GRANULOCYTES NFR BLD AUTO: 0.8 % (ref 0–0.5)
INR PPP: 1 (ref 0.8–1.2)
LYMPHOCYTES # BLD AUTO: 1.2 K/UL (ref 1–4.8)
LYMPHOCYTES # BLD AUTO: 1.3 K/UL (ref 1–4.8)
LYMPHOCYTES # BLD AUTO: 1.4 K/UL (ref 1–4.8)
LYMPHOCYTES # BLD AUTO: 1.4 K/UL (ref 1–4.8)
LYMPHOCYTES NFR BLD: 6.4 % (ref 18–48)
LYMPHOCYTES NFR BLD: 7.9 % (ref 18–48)
LYMPHOCYTES NFR BLD: 8.1 % (ref 18–48)
LYMPHOCYTES NFR BLD: 8.2 % (ref 18–48)
MAGNESIUM SERPL-MCNC: 2 MG/DL (ref 1.6–2.6)
MCH RBC QN AUTO: 28.7 PG (ref 27–31)
MCH RBC QN AUTO: 28.8 PG (ref 27–31)
MCH RBC QN AUTO: 29.1 PG (ref 27–31)
MCH RBC QN AUTO: 29.7 PG (ref 27–31)
MCHC RBC AUTO-ENTMCNC: 30.8 G/DL (ref 32–36)
MCHC RBC AUTO-ENTMCNC: 32 G/DL (ref 32–36)
MCHC RBC AUTO-ENTMCNC: 32.2 G/DL (ref 32–36)
MCHC RBC AUTO-ENTMCNC: 32.8 G/DL (ref 32–36)
MCV RBC AUTO: 89 FL (ref 82–98)
MCV RBC AUTO: 91 FL (ref 82–98)
MCV RBC AUTO: 91 FL (ref 82–98)
MCV RBC AUTO: 94 FL (ref 82–98)
MONOCYTES # BLD AUTO: 0.9 K/UL (ref 0.3–1)
MONOCYTES # BLD AUTO: 1 K/UL (ref 0.3–1)
MONOCYTES # BLD AUTO: 1.1 K/UL (ref 0.3–1)
MONOCYTES # BLD AUTO: 1.2 K/UL (ref 0.3–1)
MONOCYTES NFR BLD: 5.5 % (ref 4–15)
MONOCYTES NFR BLD: 5.7 % (ref 4–15)
MONOCYTES NFR BLD: 6.2 % (ref 4–15)
MONOCYTES NFR BLD: 7 % (ref 4–15)
NEUTROPHILS # BLD AUTO: 13.3 K/UL (ref 1.8–7.7)
NEUTROPHILS # BLD AUTO: 13.4 K/UL (ref 1.8–7.7)
NEUTROPHILS # BLD AUTO: 14.4 K/UL (ref 1.8–7.7)
NEUTROPHILS # BLD AUTO: 15.3 K/UL (ref 1.8–7.7)
NEUTROPHILS NFR BLD: 80.2 % (ref 38–73)
NEUTROPHILS NFR BLD: 80.7 % (ref 38–73)
NEUTROPHILS NFR BLD: 81.5 % (ref 38–73)
NEUTROPHILS NFR BLD: 83.2 % (ref 38–73)
NRBC BLD-RTO: 0 /100 WBC
PHOSPHATE SERPL-MCNC: 3.1 MG/DL (ref 2.7–4.5)
PLATELET # BLD AUTO: 270 K/UL (ref 150–350)
PLATELET # BLD AUTO: 298 K/UL (ref 150–350)
PLATELET # BLD AUTO: 306 K/UL (ref 150–350)
PLATELET # BLD AUTO: 311 K/UL (ref 150–350)
PMV BLD AUTO: 10 FL (ref 9.2–12.9)
PMV BLD AUTO: 10.2 FL (ref 9.2–12.9)
PMV BLD AUTO: 9.8 FL (ref 9.2–12.9)
PMV BLD AUTO: 9.8 FL (ref 9.2–12.9)
POCT GLUCOSE: 132 MG/DL (ref 70–110)
POCT GLUCOSE: 150 MG/DL (ref 70–110)
POCT GLUCOSE: 155 MG/DL (ref 70–110)
POCT GLUCOSE: 161 MG/DL (ref 70–110)
POCT GLUCOSE: 205 MG/DL (ref 70–110)
POCT GLUCOSE: 207 MG/DL (ref 70–110)
POTASSIUM SERPL-SCNC: 4.4 MMOL/L (ref 3.5–5.1)
PROT SERPL-MCNC: 5.6 G/DL (ref 6–8.4)
PROTHROMBIN TIME: 10.5 SEC (ref 9–12.5)
RBC # BLD AUTO: 2.36 M/UL (ref 4–5.4)
RBC # BLD AUTO: 3.37 M/UL (ref 4–5.4)
RBC # BLD AUTO: 3.4 M/UL (ref 4–5.4)
RBC # BLD AUTO: 3.48 M/UL (ref 4–5.4)
SODIUM SERPL-SCNC: 143 MMOL/L (ref 136–145)
TRANS ERYTHROCYTES VOL PATIENT: NORMAL ML
TRANS ERYTHROCYTES VOL PATIENT: NORMAL ML
WBC # BLD AUTO: 16.45 K/UL (ref 3.9–12.7)
WBC # BLD AUTO: 16.74 K/UL (ref 3.9–12.7)
WBC # BLD AUTO: 17.72 K/UL (ref 3.9–12.7)
WBC # BLD AUTO: 18.36 K/UL (ref 3.9–12.7)

## 2019-05-14 PROCEDURE — 99233 SBSQ HOSP IP/OBS HIGH 50: CPT | Mod: ,,, | Performed by: SURGERY

## 2019-05-14 PROCEDURE — 25000003 PHARM REV CODE 250: Performed by: STUDENT IN AN ORGANIZED HEALTH CARE EDUCATION/TRAINING PROGRAM

## 2019-05-14 PROCEDURE — 99233 PR SUBSEQUENT HOSPITAL CARE,LEVL III: ICD-10-PCS | Mod: ,,, | Performed by: SURGERY

## 2019-05-14 PROCEDURE — 84100 ASSAY OF PHOSPHORUS: CPT

## 2019-05-14 PROCEDURE — 80053 COMPREHEN METABOLIC PANEL: CPT

## 2019-05-14 PROCEDURE — 83050 HGB METHEMOGLOBIN QUAN: CPT

## 2019-05-14 PROCEDURE — 85025 COMPLETE CBC W/AUTO DIFF WBC: CPT | Mod: 91

## 2019-05-14 PROCEDURE — P9021 RED BLOOD CELLS UNIT: HCPCS

## 2019-05-14 PROCEDURE — 27000221 HC OXYGEN, UP TO 24 HOURS

## 2019-05-14 PROCEDURE — 99900026 HC AIRWAY MAINTENANCE (STAT)

## 2019-05-14 PROCEDURE — 94003 VENT MGMT INPAT SUBQ DAY: CPT

## 2019-05-14 PROCEDURE — 94770 HC EXHALED C02 TEST: CPT

## 2019-05-14 PROCEDURE — C9113 INJ PANTOPRAZOLE SODIUM, VIA: HCPCS | Performed by: STUDENT IN AN ORGANIZED HEALTH CARE EDUCATION/TRAINING PROGRAM

## 2019-05-14 PROCEDURE — 94761 N-INVAS EAR/PLS OXIMETRY MLT: CPT

## 2019-05-14 PROCEDURE — 63600175 PHARM REV CODE 636 W HCPCS: Performed by: STUDENT IN AN ORGANIZED HEALTH CARE EDUCATION/TRAINING PROGRAM

## 2019-05-14 PROCEDURE — 83735 ASSAY OF MAGNESIUM: CPT

## 2019-05-14 PROCEDURE — 27200966 HC CLOSED SUCTION SYSTEM

## 2019-05-14 PROCEDURE — 99232 SBSQ HOSP IP/OBS MODERATE 35: CPT | Mod: ,,, | Performed by: NURSE PRACTITIONER

## 2019-05-14 PROCEDURE — 82800 BLOOD PH: CPT

## 2019-05-14 PROCEDURE — 99232 PR SUBSEQUENT HOSPITAL CARE,LEVL II: ICD-10-PCS | Mod: ,,, | Performed by: NURSE PRACTITIONER

## 2019-05-14 PROCEDURE — 85610 PROTHROMBIN TIME: CPT

## 2019-05-14 PROCEDURE — 99900035 HC TECH TIME PER 15 MIN (STAT)

## 2019-05-14 PROCEDURE — 85730 THROMBOPLASTIN TIME PARTIAL: CPT

## 2019-05-14 PROCEDURE — 20000000 HC ICU ROOM

## 2019-05-14 PROCEDURE — 82803 BLOOD GASES ANY COMBINATION: CPT

## 2019-05-14 RX ORDER — HYDROCODONE BITARTRATE AND ACETAMINOPHEN 500; 5 MG/1; MG/1
TABLET ORAL
Status: DISCONTINUED | OUTPATIENT
Start: 2019-05-14 | End: 2019-05-17

## 2019-05-14 RX ORDER — SILODOSIN 4 MG/1
4 CAPSULE ORAL DAILY
Status: DISCONTINUED | OUTPATIENT
Start: 2019-05-14 | End: 2019-05-29

## 2019-05-14 RX ADMIN — HYDROMORPHONE HYDROCHLORIDE 1 MG: 1 INJECTION, SOLUTION INTRAMUSCULAR; INTRAVENOUS; SUBCUTANEOUS at 07:05

## 2019-05-14 RX ADMIN — INSULIN ASPART 1 UNITS: 100 INJECTION, SOLUTION INTRAVENOUS; SUBCUTANEOUS at 03:05

## 2019-05-14 RX ADMIN — CHLORHEXIDINE GLUCONATE 0.12% ORAL RINSE 15 ML: 1.2 LIQUID ORAL at 09:05

## 2019-05-14 RX ADMIN — SILODOSIN 4 MG: 4 CAPSULE ORAL at 11:05

## 2019-05-14 RX ADMIN — LABETALOL HCL IV SOLN PREFILLED SYRINGE 20 MG/4ML (5 MG/ML) 10 MG: 20/4 SOLUTION PREFILLED SYRINGE at 11:05

## 2019-05-14 RX ADMIN — HYDRALAZINE HYDROCHLORIDE 10 MG: 20 INJECTION INTRAMUSCULAR; INTRAVENOUS at 09:05

## 2019-05-14 RX ADMIN — MICONAZOLE NITRATE: 20 OINTMENT TOPICAL at 08:05

## 2019-05-14 RX ADMIN — RIFAMPIN 300 MG: 600 INJECTION, POWDER, LYOPHILIZED, FOR SOLUTION INTRAVENOUS at 03:05

## 2019-05-14 RX ADMIN — ACETAMINOPHEN 650 MG: 325 TABLET ORAL at 05:05

## 2019-05-14 RX ADMIN — INSULIN ASPART 3 UNITS: 100 INJECTION, SOLUTION INTRAVENOUS; SUBCUTANEOUS at 03:05

## 2019-05-14 RX ADMIN — RIFAMPIN 300 MG: 600 INJECTION, POWDER, LYOPHILIZED, FOR SOLUTION INTRAVENOUS at 02:05

## 2019-05-14 RX ADMIN — CEFAZOLIN 2 G: 1 INJECTION, POWDER, FOR SOLUTION INTRAMUSCULAR; INTRAVENOUS at 09:05

## 2019-05-14 RX ADMIN — INSULIN ASPART 1 UNITS: 100 INJECTION, SOLUTION INTRAVENOUS; SUBCUTANEOUS at 12:05

## 2019-05-14 RX ADMIN — INSULIN ASPART 3 UNITS: 100 INJECTION, SOLUTION INTRAVENOUS; SUBCUTANEOUS at 08:05

## 2019-05-14 RX ADMIN — INSULIN ASPART 3 UNITS: 100 INJECTION, SOLUTION INTRAVENOUS; SUBCUTANEOUS at 04:05

## 2019-05-14 RX ADMIN — HYDRALAZINE HYDROCHLORIDE 10 MG: 20 INJECTION INTRAMUSCULAR; INTRAVENOUS at 10:05

## 2019-05-14 RX ADMIN — CEFAZOLIN 2 G: 1 INJECTION, POWDER, FOR SOLUTION INTRAMUSCULAR; INTRAVENOUS at 02:05

## 2019-05-14 RX ADMIN — PANTOPRAZOLE SODIUM 40 MG: 40 INJECTION, POWDER, FOR SOLUTION INTRAVENOUS at 08:05

## 2019-05-14 RX ADMIN — CHLORHEXIDINE GLUCONATE 0.12% ORAL RINSE 15 ML: 1.2 LIQUID ORAL at 08:05

## 2019-05-14 RX ADMIN — HYDROMORPHONE HYDROCHLORIDE 1 MG: 1 INJECTION, SOLUTION INTRAMUSCULAR; INTRAVENOUS; SUBCUTANEOUS at 03:05

## 2019-05-14 RX ADMIN — CEFAZOLIN 2 G: 1 INJECTION, POWDER, FOR SOLUTION INTRAMUSCULAR; INTRAVENOUS at 05:05

## 2019-05-14 RX ADMIN — HYDROXYZINE HYDROCHLORIDE 10 MG: 10 SOLUTION ORAL at 10:05

## 2019-05-14 RX ADMIN — INSULIN ASPART 3 UNITS: 100 INJECTION, SOLUTION INTRAVENOUS; SUBCUTANEOUS at 12:05

## 2019-05-14 NOTE — PROGRESS NOTES
1830 Patient complaining of having the urge to urinate, but unable to. Bladder scan showed 900ml. Updated MD Edmar. Ordered to place correa catheter.    1845 Correa catheter placed. Patient urine is milky orange in color. Updated MD Edmar, ordered to send UA and urine culture. No further orders were given. Will continue to monitor.

## 2019-05-14 NOTE — PT/OT/SLP PROGRESS
Occupational Therapy      Patient Name:  Mariann Huff   MRN:  [id number]    Patient not seen today secondary to pt experiencing significant rectal bleeding during the night and pain from pressure to stop bleeding. Will follow-up next scheduled OT session.    Shania Harding OTR/L  Pager: [phone]  .5/14/2019

## 2019-05-14 NOTE — PLAN OF CARE
Problem: Adult Inpatient Plan of Care  Goal: Plan of Care Review  Patient VSS, afebrile, sats 100% on vent. Tube feedings at goal, tolerated well. Accuchecks q 4.GI consulted for rectal bleed and sigmoidoscopy done.  Plan of care discussed with patient, spouse and family. Fall precautions in place. Discussed medications and care.  Spouse and family have no questions at this time.Will continue to monitor.

## 2019-05-14 NOTE — PT/OT/SLP PROGRESS
Physical Therapy      Patient Name:  Mariann Huff   MRN:  [id number]    Patient not seen today secondary to (AM pt not seen 2nd to significant rectal bleeding during the night and pain from pressure to stop bleeding. ). Will follow-up at a later date.    Cathy Taylor, PT   5/14/2019

## 2019-05-14 NOTE — PROGRESS NOTES
Updated MD Raul on patient vitals, nephrostomy output 30ml/ hr and  UO decreasing to 20ml/ hr. No further orders given at this time. Will continued to monitor.

## 2019-05-14 NOTE — PROGRESS NOTES
"[facility]  Endocrinology  Progress Note    Admit Date: 4/20/2019     Reason for Consult: Management of T2DM, Hyperglycemia     Surgical Procedure and Date:       04/21/2019:         1. Exploratory laparotomy        2. Ligation of left ureter        3. Removal of left JJ ureteral stent      Diabetes diagnosis year: ANDRE    Home Diabetes Medications:  ANDRE  Toujeo 50 BID  Invokana 300mg daily   Novolog 35 units AM, 45 units PM, 35 units PM    How often checking glucose at home? ANDRE   BG readings on regimen: ANDRE  Hypoglycemia on the regimen?  ANDRE  Missed doses on regimen?  ANDRE    Diabetes Complications include:     Hyperglycemia and Diabetic retinopathy     Complicating diabetes co morbidities:   History of MI and MURTAZA, CAD, HLD    HPI:   Patient is a 59 y.o. female with a diagnosis of HTN, HLN, DM type 2, nonproliferative diabetic renopathy, CAD, NSTEMI, and back pain who presents to the ED with a complaint of altered mental status on 04/20/2019. Is now s/p Exploratory laparotomy, Ligation of left ureter, and Removal of left JJ ureteral stent. Endocrinology consulted to manage DM2/Hyperglycemia.    Lab Results   Component Value Date    HGBA1C 10.8 (H) 02/19/2019       Interval HPI:   Overnight events: Remains in SICU. NAEON. BG within goal ranges on scheduled Novolog and SQ correction scale.   Eating:   NPO  Nausea: No  Hypoglycemia and intervention: No  Fever: Yes (99.7)   TPN and/or TF: Yes  If yes, type of TF/TPN and rate: Paptamen @ 40 cc/hr    BP (!) 149/70 (BP Location: Right arm, Patient Position: Lying)   Pulse 93   Temp 99.7 °F (37.6 °C) (Oral)   Resp 19   Ht 5' 5" (1.651 m)   Wt 84 kg (185 lb 3 oz)   SpO2 100%   Breastfeeding? No   BMI 30.82 kg/m²      Labs Reviewed and Include    Recent Labs   Lab 05/13/19  0246   *   CALCIUM 8.7   ALBUMIN 1.7*   PROT 6.3      K 4.2   CO2 24      BUN 40*   CREATININE 1.5*   ALKPHOS 99   ALT 10   AST 18   BILITOT 0.6     Lab " Results   Component Value Date    WBC 14.84 (H) 05/13/2019    HGB 8.3 (L) 05/13/2019    HCT 26.6 (L) 05/13/2019    MCV 93 05/13/2019     (H) 05/13/2019     No results for input(s): TSH, FREET4 in the last 168 hours.  Lab Results   Component Value Date    HGBA1C 10.8 (H) 02/19/2019       Nutritional status:   Body mass index is 30.82 kg/m².  Lab Results   Component Value Date    ALBUMIN 1.7 (L) 05/13/2019    ALBUMIN 1.6 (L) 05/12/2019    ALBUMIN 1.5 (L) 05/11/2019     No results found for: PREALBUMIN    Estimated Creatinine Clearance: 43.8 mL/min (A) (based on SCr of 1.5 mg/dL (H)).    Accu-Checks  Recent Labs     05/11/19  1611 05/11/19  2053 05/12/19  0013 05/12/19  0428 05/12/19  0904 05/12/19  1159 05/12/19  1634 05/12/19  2028 05/12/19  2336 05/13/19  0403   POCTGLUCOSE 185* 174* 181* 176* 215* 193* 177* 187* 180* 182*       Current Medications and/or Treatments Impacting Glycemic Control  Immunotherapy:    Immunosuppressants     None        Steroids:   Hormones (From admission, onward)    None        Pressors:    Autonomic Drugs (From admission, onward)    None        Hyperglycemia/Diabetes Medications:   Antihyperglycemics (From admission, onward)    Start     Stop Route Frequency Ordered    05/11/19 1200  insulin aspart U-100 pen 3 Units      -- SubQ Every 24 hours (non-standard times) 05/10/19 1351    05/11/19 0800  insulin aspart U-100 pen 3 Units      -- SubQ Every 24 hours (non-standard times) 05/10/19 1351    05/11/19 0400  insulin aspart U-100 pen 3 Units      -- SubQ Every 24 hours (non-standard times) 05/10/19 1351    05/11/19 0000  insulin aspart U-100 pen 3 Units      -- SubQ Every 24 hours (non-standard times) 05/10/19 1351    05/10/19 2000  insulin aspart U-100 pen 3 Units      -- SubQ Every 24 hours (non-standard times) 05/10/19 1351    05/10/19 1600  insulin aspart U-100 pen 3 Units      -- SubQ Every 24 hours (non-standard times) 05/10/19 1351    05/10/19 1450  insulin aspart U-100 pen  0-5 Units      -- SubQ Every 4 hours PRN 05/10/19 1351          ASSESSMENT and PLAN    * Ureteral transection of left native kidney  Managed per primary team  Avoid hypoglycemia        Type 2 diabetes mellitus with diabetic peripheral angiopathy without gangrene  BG goal 140 - 180    Novolog 3 units q 4 hrs (wieght based dosing using 0.5 modifier)  Hold if TFs are stopped.  Low dose correction scale   BG monitoring q 4 hrs.     ** Please notify Endocrine for any change and/or advance in diet/TF**  ** Please call Endocrine for any BG related issues **    Discharge Recommendations:     TBD.             CAD (coronary artery disease)  Managed per primary team  Condition may cause insulin resistance       Sleep apnea  May affect BG readings if uncontrolled        PAPA (acute kidney injury)  Caution with insulin stacking        HLD (hyperlipidemia)  Managed per primary team  Condition may cause insulin resistance         On enteral nutrition  May cause BG excursions.           Jolanta Morales NP  Endocrinology  [facility]

## 2019-05-14 NOTE — PROGRESS NOTES
[facility]  Colorectal Surgery  Progress Note    Patient Name: Mariann Huff  MRN: [id number]  Admission Date: 4/20/2019  Hospital Length of Stay: 24 days  Attending Physician: Robin Boyd MD    Subjective:     Interval History: flex sig yesterday afternoon, no stigmata of bleeding around rectal ulcer, had acute bleed overnight, stopped with rectal packing, HDS but H/H trended down to 6/22. Transfused 2u PRBCs this am.     Post-Op Info:  Procedure(s) (LRB):  SIGMOIDOSCOPY, FLEXIBLE (N/A)   1 Day Post-Op      Medications:  Continuous Infusions:  Scheduled Meds:   ceFAZolin (ANCEF) IVPB  2 g Intravenous Q8H    chlorhexidine  15 mL Mouth/Throat BID    insulin aspart U-100  3 Units Subcutaneous Q24H    insulin aspart U-100  3 Units Subcutaneous Q24H    insulin aspart U-100  3 Units Subcutaneous Q24H    insulin aspart U-100  3 Units Subcutaneous Q24H    insulin aspart U-100  3 Units Subcutaneous Q24H    insulin aspart U-100  3 Units Subcutaneous Q24H    miconazole nitrate 2%   Topical (Top) BID    pantoprazole  40 mg Intravenous Daily    rifAMpin (RIFADIN) IVPB  300 mg Intravenous Q12H    silodosin  4 mg Per G Tube Daily     PRN Meds:   sodium chloride    acetaminophen    albuterol-ipratropium    dextrose 50%    glucagon (human recombinant)    hydrALAZINE    HYDROmorphone    hydrOXYzine    insulin aspart U-100    labetalol    magnesium sulfate IVPB    magnesium sulfate IVPB    potassium chloride in water    promethazine (PHENERGAN) IVPB    sodium chloride 0.9%        Objective:     Vital Signs (Most Recent):  Temp: 98.8 °F (37.1 °C) (05/14/19 0702)  Pulse: 95 (05/14/19 0800)  Resp: 20 (05/14/19 0800)  BP: (!) 178/86 (05/14/19 0800)  SpO2: 100 % (05/14/19 0800) Vital Signs (24h Range):  Temp:  [98.5 °F (36.9 °C)-99.8 °F (37.7 °C)] 98.8 °F (37.1 °C)  Pulse:  [] 95  Resp:  [12-27] 20  SpO2:  [98 %-100 %] 100 %  BP: (125-197)/(55-94) 178/86     Intake/Output - Last 3  Shifts       05/12 0700 - 05/13 0659 05/13 0700 - 05/14 0659 05/14 0700 - 05/15 0659    I.V. (mL/kg) 152.2 (1.8) 397.2 (4.8)     NG/GT 1920 1350 0    Total Intake(mL/kg) 2072.2 (24.7) 1747.2 (21.1) 0 (0)    Urine (mL/kg/hr) 450 (0.2) 1940 (1) 245 (1.1)    Drains 30 20 0    Other 0      Stool 0      Total Output 480 1960 245    Net +1592.2 -212.8 -245           Urine Occurrence 1 x      Stool Occurrence 3 x            Physical Exam   Constitutional: No distress.   Eyes: EOM are normal.   Neck: Neck supple.   Cardiovascular: Normal rate and regular rhythm.   Pulmonary/Chest: Effort normal. No respiratory distress.   Trach in place   Abdominal: Soft. She exhibits no distension. There is no tenderness.   Mild distention   Musculoskeletal: Normal range of motion.   Neurological: She is alert.   Skin: Skin is warm and dry.       Significant Labs:  BMP (Last 3 Results):   Recent Labs   Lab 05/12/19  0359 05/13/19  0246 05/13/19 2234 05/14/19  0300   * 169* 164* 172*    142 141 143   K 3.9 4.2 4.2 4.4    108 110 111*   CO2 25 24 25 24   BUN 32* 40* 42* 41*   CREATININE 1.1 1.5* 1.5* 1.3   CALCIUM 8.5* 8.7 8.9 8.4*   MG 2.2 2.2  --  2.0     CBC (Last 3 Results):   Recent Labs   Lab 05/13/19 2234 05/14/19  0300 05/14/19  0732   WBC 16.68*  16.68* 16.74* 18.36*   RBC 2.72*  2.72* 2.36* 3.37*   HGB 7.9*  7.9* 6.8* 9.8*   HCT 25.8*  25.8* 22.1* 30.6*     347 311 298   MCV 95  95 94 91   MCH 29.0  29.0 28.8 29.1   MCHC 30.6*  30.6* 30.8* 32.0       Significant Diagnostics:  None    Assessment/Plan:     BRBPR (bright red blood per rectum)  Ms. Huff is a 58 yo F with PMH of HTN, DM II, CAD, NSTEMI, s/p L5-S1 OLIF with NSGY 4/12 c/b intraoperative left ureteral injury and underwent ureteroureteral anastomoses and ureteral stent placement complicated by sepsis due to E.coli septicemia now s/p ex lap on 4/21 and PEG/Trach of the vent now having BRBPR.     S/p flex sig on 5/13  Cont  quickclot/surgicell packing as needed  Transfuse as needeed  cont to hold hep gtt  Please call with any questions or concerns               John Barker MD  Colorectal Surgery  [facility]

## 2019-05-14 NOTE — PROGRESS NOTES
[facility]  Critical Care - Surgery  Progress Note    Patient Name: Mariann Huff  MRN: [id number]  Admission Date: 4/20/2019  Hospital Length of Stay: 24 days  Code Status: Full Code  Attending Provider: Robin Boyd MD  Primary Care Provider: Jasbir Haney MD   Principal Problem: Ureteral transection of left native kidney    Subjective:     Hospital/ICU Course:  The patient has had 2 episodes of bradycardia during the day.  Early in the morning, the patient had difficulty breathing and a mucus plug was found in the inner cannula of the tracheostomy.  She developed bradycardia but never arrested.  She also developed hypotension.  This was followed subsequently after she was bagged (Ambu) with tachypnea and hypertension and tachycardia.  Oxygen saturations were in the low 90s, and the arterial blood gas at that time revealed a significant alveolar arterial gradient with adequate ventilation.  Chest x-ray, which I personally reviewed, revealed what appeared to be a significant fluid overload.  We did perform an echocardiogram urgently, which I personally was present and reviewed the results.  There is no evidence of heart failure.  EKG was reported as normal and there is no evidence that the patient had an acute myocardial event.  The chest x-ray did suggest a significant amount of pulmonary edema. There was also a concern, because the the history of upper body deep venous thrombosis, that this could be a pulmonary embolism.  A CT scan of the chest , which I have personally reviewed show bilateral infiltrates compatible with pulmonary edema of non cardiogenic origin.  It was not possible to rule out a pulmonary embolism.  The patient is anticoagulated currently.  Ultrasound of the lower extremities was done and they do not show any deep venous thrombosis.    The patient did receive some fluids, because of her history of acute kidney injury, which has resolved and because she was receiving IV  contrast.  She has maintained a good urinary output during the day.  She has mostly being hemodynamically stable and her tachycardia resolved.  She remains sedated and on the ventilator.  Follow-up chest x-ray, which I personally reviewed, showed resolving pulmonary edema. Her oxygenation and oxygen saturations improved and we were able to wean the patient FiO2 down to 50%.    Of note, I did perform a bronchoscopy, which failed to reveal any amount of pus or mucus plugging.  There was also no evidence of aspiration.  BAL was performed and was sent.    A 2nd episode of bradycardia was observed in the afternoon.  This responded to the use of atropine and 0.1 mg of epinephrine.  She did have a tachycardic response with this which subsided rapidly.  She remained otherwise hemodynamically stable.  Arterial blood gases, reveal now a PO2 of 300 with adequate pCO2 and pH.  Mild hyperventilation.  She remains comfortable.  I have asked Cardiology to re-evaluate this patient.  For now she remains off pressors.    Neurologically the patient has been intact. She is awake alert and oriented with a nonfocal exam.    Her abdomen remains soft.  She has been NPO for now.  She has not had any further episodes of lower GI bleed.    The patient is having a good urinary output BUN and creatinine have been grossly within normal limits with a slight elevation of BUN.  Electrolytes are being followed and replaced as necessary.    Currently no evidence of infection in this patient.  Empirically I started antibiotics, but will stop them in the next 24-48 hours.    I have had the chance to talk to the patient's  at length and in detail.  I have explained our findings, I will keep him up-to-date as to any progress in understanding the etiology of these episodes and will wait continuing to talk to him as often as necessary.      Interval History/Significant Events: Sigmoidoscopy at bedside yesterday showed rectal ulceration with no  active bleeding. Hematochezia overnight, passage of some 300-400cc clot. Placed large diameter surgicel/gauze roll in rectum with resolution of passage of blood. Receiving 2U PRBC for drop in Hgb from 8.3 to 6.8. Has been HDS through this, actually HTN. Maintaining 40 ml/hr urine output. Fontenot placed yesterday and 1100 ml urine passed.    Follow-up For: Procedure(s) (LRB):  SIGMOIDOSCOPY, FLEXIBLE (N/A)    Post-Operative Day: 1 Day Post-Op    Objective:     Vital Signs (Most Recent):  Temp: 99 °F (37.2 °C) (05/14/19 0615)  Pulse: 91 (05/14/19 0615)  Resp: 18 (05/14/19 0628)  BP: (!) 175/87 (05/14/19 0615)  SpO2: 100 % (05/14/19 0615) Vital Signs (24h Range):  Temp:  [98.5 °F (36.9 °C)-99.8 °F (37.7 °C)] 99 °F (37.2 °C)  Pulse:  [] 91  Resp:  [13-27] 18  SpO2:  [98 %-100 %] 100 %  BP: (125-197)/(55-94) 175/87     Weight: 83 kg (182 lb 15.7 oz)  Body mass index is 30.45 kg/m².      Intake/Output Summary (Last 24 hours) at 5/14/2019 0630  Last data filed at 5/14/2019 0500  Gross per 24 hour   Intake 1747.2 ml   Output 1960 ml   Net -212.8 ml       Physical Exam   Constitutional: She appears well-developed and well-nourished.   HENT:   Head: Normocephalic and atraumatic.   Tracheostomy in place   Eyes: Right eye exhibits no discharge. Left eye exhibits no discharge.   Neck: Normal range of motion. Neck supple.   Cardiovascular: Normal rate and regular rhythm.   Pulmonary/Chest: Effort normal. No respiratory distress.   Abdominal:   Midline incision c/d/i.  PEG with no leak. Abdomen soft, non-distended.  Bowel sounds present    MADHURI drain with minimal serous output   Genitourinary:   Genitourinary Comments: Nephrostomy tube in place with watery dark yellow output.  Fontenot in place with cloudy yellow urine   Musculoskeletal: Normal range of motion.   Neurological: She is alert.   Able to follow commands, denying pain.    Skin: Skin is warm and dry.   Vitals reviewed.      Vents:  Vent Mode: PAV+ (05/14/19  0545)  Ventilator Initiated: Yes (05/08/19 0630)  Set Rate: 18 bmp (05/13/19 0742)  Vt Set: 420 mL (05/13/19 0742)  Pressure Support: 0 cmH20 (04/29/19 1014)  PEEP/CPAP: 5 cmH20 (05/14/19 0545)  Oxygen Concentration (%): 40 (05/14/19 0615)  Peak Airway Pressure: 16 cmH2O (05/14/19 0545)  Plateau Pressure: 0 cmH20 (05/14/19 0545)  Total Ve: 10.1 mL (05/14/19 0545)  Negative Inspiratory Force (cm H2O): -18 (04/27/19 1306)  F/VT Ratio<105 (RSBI): (!) 51.98 (05/14/19 0545)    Lines/Drains/Airways     Central Venous Catheter Line                 Percutaneous Central Line Insertion/Assessment - triple lumen  05/12/19 1256  1 day          Drain                 Closed/Suction Drain 04/21/19 0300 LLQ 23 days         Nephrostomy 04/21/19 0629 Left 8 Fr. 23 days         Gastrostomy/Enterostomy 05/02/19 1134 Percutaneous endoscopic gastrostomy (PEG) LUQ decompression;feeding 11 days         Urethral Catheter 05/13/19 1830 less than 1 day          Airway                 Surgical Airway 05/02/19 1107 Shiley Cuffed 11 days          Peripheral Intravenous Line                 Midline Catheter Insertion/Assessment  - Single Lumen 05/07/19 1632 Left basilic vein (medial side of arm) 18g x 8cm 6 days         Peripheral IV - Single Lumen 05/10/19 1020 22 G Right Upper Arm 3 days                Significant Labs:    CBC/Anemia Profile:  Recent Labs   Lab 05/13/19  1200 05/13/19 2234 05/14/19  0300   WBC 16.35* 16.68*  16.68* 16.74*   HGB 8.4* 7.9*  7.9* 6.8*   HCT 27.4* 25.8*  25.8* 22.1*   * 347  347 311   MCV 95 95  95 94   RDW 15.3* 15.4*  15.4* 15.4*        Chemistries:  Recent Labs   Lab 05/13/19  0246 05/13/19 2234 05/14/19  0300    141 143   K 4.2 4.2 4.4    110 111*   CO2 24 25 24   BUN 40* 42* 41*   CREATININE 1.5* 1.5* 1.3   CALCIUM 8.7 8.9 8.4*   ALBUMIN 1.7* 1.7* 1.6*   PROT 6.3 6.0 5.6*   BILITOT 0.6 0.7 0.6   ALKPHOS 99 111 100   ALT 10 7* 6*   AST 18 16 16   MG 2.2  --  2.0   PHOS 2.2*  --  3.1        Urine Culture: No results for input(s): LABURIN in the last 48 hours.  Urine Studies:   Recent Labs   Lab 05/13/19  1846   COLORU Yellow   APPEARANCEUA Cloudy*   PHUR 5.0   SPECGRAV 1.020   PROTEINUA 2+*   GLUCUA Negative   KETONESU Negative   BILIRUBINUA Negative   OCCULTUA 1+*   NITRITE Negative   LEUKOCYTESUR Trace*   RBCUA 16*   WBCUA >100*   BACTERIA Many*   SQUAMEPITHEL 25   HYALINECASTS 0     All pertinent labs within the past 24 hours have been reviewed.    Significant Imaging:  I have reviewed all pertinent imaging results/findings within the past 24 hours.    Assessment/Plan:     * Ureteral transection of left native kidney  ASSESSMENT/PLAN:   Mariann Huff is a 59 y.o. female s/p left ureteral injury on 4/12, who presented with fever, AMS, and intraabdominal abscess, taken for washout and ligation of left ureter with nephrostomy tube placement on 4/21. S/p Trach/PEG on 5/2. Episode of negative pressure pulmonary edema on 5/8 requiring mechanical ventilation, diuresis and low dose pressor.      Neuro:   - off sedation  - responds to questions appropriately by nodding  - no focal deficit     Pulmonary:   Vent Mode: PAV+  Oxygen Concentration (%):  [] 40  Resp Rate Total:  [14 br/min-29 br/min] 23 br/min  Vt Set:  [420 mL] 420 mL  PEEP/CPAP:  [5 cmH20] 5 cmH20  Mean Airway Pressure:  [6.2 cnX55-36 cmH20] 7.4 cmH20    - Been tolerating trach collar since 5/3 until episode of flash pulmonary edema on 5/8  - Pulmonary edema likely secondary to occlusion and negative pressure pulmonary edema  - SpO2 % on ventilator  - CXR to follow edema, today is largely the same as yesterday accounting for poor inspiratory effort  - ABGs prn  - Continue weaning vent as able     Cardiac:   - HDS at this time.  - TTE 5/8 with no evidence of right heart strain or significant valvular pathology, normal LV systolic function, EF 70%     Renal:    - S/p transection of left ureter 4/12 and subsequent ligation and  PCT nephrostomy tube placement with IR 4/21  -Fontenot placed yesterday with 1100 output, then 40-50/hr  - Renal function improving.    - Monitor I/Os  - NAC administered for renal protection on 5/8        Intake/Output Summary (Last 24 hours) at 5/14/2019 0646  Last data filed at 5/14/2019 0500  Gross per 24 hour   Intake 1747.2 ml   Output 1960 ml   Net -212.8 ml        ID:   - Blood 5/10 NGTD  - Ucx 5/13 pending  - BAL 5/18 negative  - C diff 5/5 negative    - Blood cultures 4/12 +E. Coli; sensitivities pending. Repeat Bld Cx show NGTD.   - UCx from 4/20 growing E. Coli; sensitivities are now back. Repeat Cx pending.   - ID following for assistance with abx therapy, DC'd vanc and zosyn 5/12, started Ancef, continuing rifampin  - AF overnight  - WBC trending down     Hem/Onc:   - H/H fell 2U overnight  - transfused 2U this AM with good response  - Cont to monitor     Endocrine:  - DM Type 2 at baseline    - TF @ goal  - LDSSI  - Endocrine following for assistance with blood glucose control.      Fluids/Electrolytes/Nutrition/GI:   - Free water flushes 300 cc q6h  - Replace electrolytes PRN     # Shock Liver          - Resolved           - Labs continue to show improvement          - Elevated LFTs now normalized          - Thrombocytopenia - now normalized          - INR normalized to 0.9     # Lactic Acidosis          - Now resolved    # Bloody BM  - continued hematochezia  - surgicel and gauze placed overnight with resolution of passage of blood, whether this is from tamponade or just stopping passage of blood is unclear  - will need to be removed tomorrow  - CRS following     PPx:  - DVT: Lovenox, Hep gtt held for continued bloody BMs        DISPO:  - Continue SICU care  - Preparing for LTACH once bloody BMs are managed          Critical secondary to Patient has a condition that poses threat to life and bodily function: Severe Respiratory Distress     Critical care was time spent personally by me on the following  activities: development of treatment plan with patient or surrogate and bedside caregivers, discussions with consultants, evaluation of patient's response to treatment, examination of patient, ordering and performing treatments and interventions, ordering and review of laboratory studies, ordering and review of radiographic studies, pulse oximetry, re-evaluation of patient's condition.  This critical care time did not overlap with that of any other provider or involve time for any procedures.     ISABELLE Carter MD  Critical Care - Surgery  [facility]

## 2019-05-14 NOTE — PROGRESS NOTES
Upon turning patient, discovered large bright red rectal blood x2 blue pads; called Dr. Hyatt with SICU to bedside.  Patient then passed all of old surgicel that had been previously placed during sigmoidoscopy earlier today and bleeding continued. MD held pressure for 30+ minutes and replaced surgicel packing; Colorectal team was also notified by MD and has advised to re-pack and continue to monitor. Plan: possible colonoscopy in a.m.

## 2019-05-14 NOTE — SUBJECTIVE & OBJECTIVE
Interval History:   Afebrile VSS. Continued bloody BMs overnight. Continues on the vent.    Review of Systems    Objective:     Temp:  [98.5 °F (36.9 °C)-99.8 °F (37.7 °C)] 99 °F (37.2 °C)  Pulse:  [] 91  Resp:  [13-27] 18  SpO2:  [98 %-100 %] 100 %  BP: (125-197)/(55-94) 175/87     Body mass index is 30.45 kg/m².      Bladder Scan Volume (mL): 27 mL (05/10/19 0846)  Post Void Cath Residual Output (mL): 27 mL (05/10/19 0846)    Drains     Drain                 Closed/Suction Drain 04/21/19 0300 LLQ 18 days         Nephrostomy 04/21/19 0629 Left 8 Fr. 18 days         Urethral Catheter 04/20/19 1711 Latex 16 Fr. 18 days         Gastrostomy/Enterostomy 05/02/19 1134 Percutaneous endoscopic gastrostomy (PEG) LUQ decompression;feeding 6 days                Physical Exam   Constitutional: She appears well-developed. No distress.   HENT:   Head: Normocephalic and atraumatic.   Neck: No JVD present.   Cardiovascular: Normal rate and regular rhythm.    Pulmonary/Chest:   On ventilator   Abdominal: Soft. She exhibits no distension. There is no rebound and no guarding.   Incision c/d/i   MADHURI drain with ss output  G-tube   Genitourinary:   Genitourinary Comments: Fontenot in place  Left neph tube with clear yellow urine   Neurological: She is alert.   Skin: Skin is warm and dry. She is not diaphoretic. No pallor.       Significant Labs:    BMP:  Recent Labs   Lab 05/13/19  0246 05/13/19  2234 05/14/19  0300    141 143   K 4.2 4.2 4.4    110 111*   CO2 24 25 24   BUN 40* 42* 41*   CREATININE 1.5* 1.5* 1.3   CALCIUM 8.7 8.9 8.4*       CBC:   Recent Labs   Lab 05/13/19  1200 05/13/19  2234 05/14/19  0300   WBC 16.35* 16.68*  16.68* 16.74*   HGB 8.4* 7.9*  7.9* 6.8*   HCT 27.4* 25.8*  25.8* 22.1*   * 347  347 311       Significant Imaging:  All pertinent imaging results/findings from the past 24 hours have been reviewed.

## 2019-05-14 NOTE — PLAN OF CARE
Problem: Adult Inpatient Plan of Care  Goal: Plan of Care Review  Patient VSS, afebrile, sats 100% on spontaneous. -180. PRN meds administered, MD aware.Tube feedings at 20ml/hr, tolerated well. Accuchecks q 4. UO 20-30 ml/hr. IV abx continued. Patient had 1 large bloody BM, no changes in H/H. BM x3 no longer bloody, MD aware.  Plan of care discussed with patient and spouse. Fall precautions in place. Discussed medications and care. Patient and spouse have no questions at this time.Will continue to monitor.

## 2019-05-14 NOTE — SUBJECTIVE & OBJECTIVE
Subjective:     Interval History: flex sig yesterday afternoon, no stigmata of bleeding around rectal ulcer, had acute bleed overnight, stopped with rectal packing, HDS but H/H trended down to 6/22. Transfused 2u PRBCs this am.     Post-Op Info:  Procedure(s) (LRB):  SIGMOIDOSCOPY, FLEXIBLE (N/A)   1 Day Post-Op      Medications:  Continuous Infusions:  Scheduled Meds:   ceFAZolin (ANCEF) IVPB  2 g Intravenous Q8H    chlorhexidine  15 mL Mouth/Throat BID    insulin aspart U-100  3 Units Subcutaneous Q24H    insulin aspart U-100  3 Units Subcutaneous Q24H    insulin aspart U-100  3 Units Subcutaneous Q24H    insulin aspart U-100  3 Units Subcutaneous Q24H    insulin aspart U-100  3 Units Subcutaneous Q24H    insulin aspart U-100  3 Units Subcutaneous Q24H    miconazole nitrate 2%   Topical (Top) BID    pantoprazole  40 mg Intravenous Daily    rifAMpin (RIFADIN) IVPB  300 mg Intravenous Q12H    silodosin  4 mg Per G Tube Daily     PRN Meds:   sodium chloride    acetaminophen    albuterol-ipratropium    dextrose 50%    glucagon (human recombinant)    hydrALAZINE    HYDROmorphone    hydrOXYzine    insulin aspart U-100    labetalol    magnesium sulfate IVPB    magnesium sulfate IVPB    potassium chloride in water    promethazine (PHENERGAN) IVPB    sodium chloride 0.9%        Objective:     Vital Signs (Most Recent):  Temp: 98.8 °F (37.1 °C) (05/14/19 0702)  Pulse: 95 (05/14/19 0800)  Resp: 20 (05/14/19 0800)  BP: (!) 178/86 (05/14/19 0800)  SpO2: 100 % (05/14/19 0800) Vital Signs (24h Range):  Temp:  [98.5 °F (36.9 °C)-99.8 °F (37.7 °C)] 98.8 °F (37.1 °C)  Pulse:  [] 95  Resp:  [12-27] 20  SpO2:  [98 %-100 %] 100 %  BP: (125-197)/(55-94) 178/86     Intake/Output - Last 3 Shifts       05/12 0700 - 05/13 0659 05/13 0700 - 05/14 0659 05/14 0700 - 05/15 0659    I.V. (mL/kg) 152.2 (1.8) 397.2 (4.8)     NG/GT 1920 1350 0    Total Intake(mL/kg) 2072.2 (24.7) 1747.2 (21.1) 0 (0)    Urine  (mL/kg/hr) 450 (0.2) 1940 (1) 245 (1.1)    Drains 30 20 0    Other 0      Stool 0      Total Output 480 1960 245    Net +1592.2 -212.8 -245           Urine Occurrence 1 x      Stool Occurrence 3 x            Physical Exam   Constitutional: No distress.   Eyes: EOM are normal.   Neck: Neck supple.   Cardiovascular: Normal rate and regular rhythm.   Pulmonary/Chest: Effort normal. No respiratory distress.   Trach in place   Abdominal: Soft. She exhibits no distension. There is no tenderness.   Mild distention   Musculoskeletal: Normal range of motion.   Neurological: She is alert.   Skin: Skin is warm and dry.       Significant Labs:  BMP (Last 3 Results):   Recent Labs   Lab 05/12/19  0359 05/13/19  0246 05/13/19 2234 05/14/19  0300   * 169* 164* 172*    142 141 143   K 3.9 4.2 4.2 4.4    108 110 111*   CO2 25 24 25 24   BUN 32* 40* 42* 41*   CREATININE 1.1 1.5* 1.5* 1.3   CALCIUM 8.5* 8.7 8.9 8.4*   MG 2.2 2.2  --  2.0     CBC (Last 3 Results):   Recent Labs   Lab 05/13/19 2234 05/14/19  0300 05/14/19  0732   WBC 16.68*  16.68* 16.74* 18.36*   RBC 2.72*  2.72* 2.36* 3.37*   HGB 7.9*  7.9* 6.8* 9.8*   HCT 25.8*  25.8* 22.1* 30.6*     347 311 298   MCV 95  95 94 91   MCH 29.0  29.0 28.8 29.1   MCHC 30.6*  30.6* 30.8* 32.0       Significant Diagnostics:  None

## 2019-05-14 NOTE — ASSESSMENT & PLAN NOTE
Ms. Huff is a 60 yo F with PMH of HTN, DM II, CAD, NSTEMI, s/p L5-S1 OLIF with NSGY 4/12 c/b intraoperative left ureteral injury and underwent ureteroureteral anastomoses and ureteral stent placement complicated by sepsis due to E.coli septicemia now s/p ex lap on 4/21 and PEG/Trach of the vent now having BRBPR.     S/p flex sig on 5/13  Cont quickclot/surgicell packing as needed  Transfuse as needeed  cont to hold hep gtt  Please call with any questions or concerns

## 2019-05-14 NOTE — ASSESSMENT & PLAN NOTE
ASSESSMENT/PLAN:   Mariann Huff is a 59 y.o. female s/p left ureteral injury on 4/12, who presented with fever, AMS, and intraabdominal abscess, taken for washout and ligation of left ureter with nephrostomy tube placement on 4/21. S/p Trach/PEG on 5/2. Episode of negative pressure pulmonary edema on 5/8 requiring mechanical ventilation, diuresis and low dose pressor.      Neuro:   - off sedation  - responds to questions appropriately by nodding  - no focal deficit     Pulmonary:   Vent Mode: PAV+  Oxygen Concentration (%):  [] 40  Resp Rate Total:  [14 br/min-29 br/min] 23 br/min  Vt Set:  [420 mL] 420 mL  PEEP/CPAP:  [5 cmH20] 5 cmH20  Mean Airway Pressure:  [6.2 goP89-96 cmH20] 7.4 cmH20    - Been tolerating trach collar since 5/3 until episode of flash pulmonary edema on 5/8  - Pulmonary edema likely secondary to occlusion and negative pressure pulmonary edema  - SpO2 % on ventilator  - CXR to follow edema, today is largely the same as yesterday accounting for poor inspiratory effort  - ABGs prn  - Continue weaning vent as able     Cardiac:   - HDS at this time.  - TTE 5/8 with no evidence of right heart strain or significant valvular pathology, normal LV systolic function, EF 70%     Renal:    - S/p transection of left ureter 4/12 and subsequent ligation and PCT nephrostomy tube placement with IR 4/21  -Fontenot placed yesterday with 1100 output, then 40-50/hr  - Renal function improving.    - Monitor I/Os  - NAC administered for renal protection on 5/8        Intake/Output Summary (Last 24 hours) at 5/14/2019 0646  Last data filed at 5/14/2019 0500  Gross per 24 hour   Intake 1747.2 ml   Output 1960 ml   Net -212.8 ml        ID:   - Blood 5/10 NGTD  - Ucx 5/13 pending  - BAL 5/18 negative  - C diff 5/5 negative    - Blood cultures 4/12 +E. Coli; sensitivities pending. Repeat Bld Cx show NGTD.   - UCx from 4/20 growing E. Coli; sensitivities are now back. Repeat Cx pending.   - ID following for  assistance with abx therapy, DC'd vanc and zosyn 5/12, started Ancef, continuing rifampin  - AF overnight  - WBC trending down     Hem/Onc:   - H/H fell 2U overnight  - transfused 2U this AM with good response  - Cont to monitor     Endocrine:  - DM Type 2 at baseline    - TF @ goal  - LDSSI  - Endocrine following for assistance with blood glucose control.      Fluids/Electrolytes/Nutrition/GI:   - Free water flushes 300 cc q6h  - Replace electrolytes PRN     # Shock Liver          - Resolved           - Labs continue to show improvement          - Elevated LFTs now normalized          - Thrombocytopenia - now normalized          - INR normalized to 0.9     # Lactic Acidosis          - Now resolved    # Bloody BM  - continued hematochezia  - surgicel and gauze placed overnight with resolution of passage of blood, whether this is from tamponade or just stopping passage of blood is unclear  - will need to be removed tomorrow  - CRS following     PPx:  - DVT: Lovenox, Hep gtt held for continued bloody BMs        DISPO:  - Continue SICU care  - Preparing for LTACH once bloody BMs are managed

## 2019-05-14 NOTE — SUBJECTIVE & OBJECTIVE
Interval History/Significant Events: Sigmoidoscopy at bedside yesterday showed rectal ulceration with no active bleeding. Hematochezia overnight, passage of some 300-400cc clot. Placed large diameter surgicel/gauze roll in rectum with resolution of passage of blood. Receiving 2U PRBC for drop in Hgb from 8.3 to 6.8. Has been HDS through this, actually HTN. Maintaining 40 ml/hr urine output. Fontenot placed yesterday and 1100 ml urine passed.    Follow-up For: Procedure(s) (LRB):  SIGMOIDOSCOPY, FLEXIBLE (N/A)    Post-Operative Day: 1 Day Post-Op    Objective:     Vital Signs (Most Recent):  Temp: 99 °F (37.2 °C) (05/14/19 0615)  Pulse: 91 (05/14/19 0615)  Resp: 18 (05/14/19 0628)  BP: (!) 175/87 (05/14/19 0615)  SpO2: 100 % (05/14/19 0615) Vital Signs (24h Range):  Temp:  [98.5 °F (36.9 °C)-99.8 °F (37.7 °C)] 99 °F (37.2 °C)  Pulse:  [] 91  Resp:  [13-27] 18  SpO2:  [98 %-100 %] 100 %  BP: (125-197)/(55-94) 175/87     Weight: 83 kg (182 lb 15.7 oz)  Body mass index is 30.45 kg/m².      Intake/Output Summary (Last 24 hours) at 5/14/2019 0630  Last data filed at 5/14/2019 0500  Gross per 24 hour   Intake 1747.2 ml   Output 1960 ml   Net -212.8 ml       Physical Exam   Constitutional: She appears well-developed and well-nourished.   HENT:   Head: Normocephalic and atraumatic.   Tracheostomy in place   Eyes: Right eye exhibits no discharge. Left eye exhibits no discharge.   Neck: Normal range of motion. Neck supple.   Cardiovascular: Normal rate and regular rhythm.   Pulmonary/Chest: Effort normal. No respiratory distress.   Abdominal:   Midline incision c/d/i.  PEG with no leak. Abdomen soft, non-distended.  Bowel sounds present    MADHURI drain with minimal serous output   Genitourinary:   Genitourinary Comments: Nephrostomy tube in place with watery dark yellow output.  Fontenot in place with cloudy yellow urine   Musculoskeletal: Normal range of motion.   Neurological: She is alert.   Able to follow commands, denying  pain.    Skin: Skin is warm and dry.   Vitals reviewed.      Vents:  Vent Mode: PAV+ (05/14/19 0545)  Ventilator Initiated: Yes (05/08/19 0630)  Set Rate: 18 bmp (05/13/19 0742)  Vt Set: 420 mL (05/13/19 0742)  Pressure Support: 0 cmH20 (04/29/19 1014)  PEEP/CPAP: 5 cmH20 (05/14/19 0545)  Oxygen Concentration (%): 40 (05/14/19 0615)  Peak Airway Pressure: 16 cmH2O (05/14/19 0545)  Plateau Pressure: 0 cmH20 (05/14/19 0545)  Total Ve: 10.1 mL (05/14/19 0545)  Negative Inspiratory Force (cm H2O): -18 (04/27/19 1306)  F/VT Ratio<105 (RSBI): (!) 51.98 (05/14/19 0545)    Lines/Drains/Airways     Central Venous Catheter Line                 Percutaneous Central Line Insertion/Assessment - triple lumen  05/12/19 1256  1 day          Drain                 Closed/Suction Drain 04/21/19 0300 LLQ 23 days         Nephrostomy 04/21/19 0629 Left 8 Fr. 23 days         Gastrostomy/Enterostomy 05/02/19 1134 Percutaneous endoscopic gastrostomy (PEG) LUQ decompression;feeding 11 days         Urethral Catheter 05/13/19 1830 less than 1 day          Airway                 Surgical Airway 05/02/19 1107 Shiley Cuffed 11 days          Peripheral Intravenous Line                 Midline Catheter Insertion/Assessment  - Single Lumen 05/07/19 1632 Left basilic vein (medial side of arm) 18g x 8cm 6 days         Peripheral IV - Single Lumen 05/10/19 1020 22 G Right Upper Arm 3 days                Significant Labs:    CBC/Anemia Profile:  Recent Labs   Lab 05/13/19  1200 05/13/19  2234 05/14/19  0300   WBC 16.35* 16.68*  16.68* 16.74*   HGB 8.4* 7.9*  7.9* 6.8*   HCT 27.4* 25.8*  25.8* 22.1*   * 347  347 311   MCV 95 95  95 94   RDW 15.3* 15.4*  15.4* 15.4*        Chemistries:  Recent Labs   Lab 05/13/19  0246 05/13/19  2234 05/14/19  0300    141 143   K 4.2 4.2 4.4    110 111*   CO2 24 25 24   BUN 40* 42* 41*   CREATININE 1.5* 1.5* 1.3   CALCIUM 8.7 8.9 8.4*   ALBUMIN 1.7* 1.7* 1.6*   PROT 6.3 6.0 5.6*   BILITOT 0.6 0.7  0.6   ALKPHOS 99 111 100   ALT 10 7* 6*   AST 18 16 16   MG 2.2  --  2.0   PHOS 2.2*  --  3.1       Urine Culture: No results for input(s): LABURIN in the last 48 hours.  Urine Studies:   Recent Labs   Lab 05/13/19  1846   COLORU Yellow   APPEARANCEUA Cloudy*   PHUR 5.0   SPECGRAV 1.020   PROTEINUA 2+*   GLUCUA Negative   KETONESU Negative   BILIRUBINUA Negative   OCCULTUA 1+*   NITRITE Negative   LEUKOCYTESUR Trace*   RBCUA 16*   WBCUA >100*   BACTERIA Many*   SQUAMEPITHEL 25   HYALINECASTS 0     All pertinent labs within the past 24 hours have been reviewed.    Significant Imaging:  I have reviewed all pertinent imaging results/findings within the past 24 hours.

## 2019-05-15 LAB
ALBUMIN SERPL BCP-MCNC: 1.6 G/DL (ref 3.5–5.2)
ALP SERPL-CCNC: 120 U/L (ref 55–135)
ALT SERPL W/O P-5'-P-CCNC: 5 U/L (ref 10–44)
ANION GAP SERPL CALC-SCNC: 9 MMOL/L (ref 8–16)
AST SERPL-CCNC: 18 U/L (ref 10–40)
BACTERIA BLD CULT: NORMAL
BACTERIA BLD CULT: NORMAL
BACTERIA UR CULT: NORMAL
BASOPHILS # BLD AUTO: 0.09 K/UL (ref 0–0.2)
BASOPHILS # BLD AUTO: 0.1 K/UL (ref 0–0.2)
BASOPHILS NFR BLD: 0.6 % (ref 0–1.9)
BASOPHILS NFR BLD: 0.6 % (ref 0–1.9)
BILIRUB SERPL-MCNC: 0.6 MG/DL (ref 0.1–1)
BUN SERPL-MCNC: 33 MG/DL (ref 6–20)
CALCIUM SERPL-MCNC: 8.7 MG/DL (ref 8.7–10.5)
CHLORIDE SERPL-SCNC: 112 MMOL/L (ref 95–110)
CO2 SERPL-SCNC: 23 MMOL/L (ref 23–29)
CREAT SERPL-MCNC: 1.1 MG/DL (ref 0.5–1.4)
DIFFERENTIAL METHOD: ABNORMAL
DIFFERENTIAL METHOD: ABNORMAL
EOSINOPHIL # BLD AUTO: 0.6 K/UL (ref 0–0.5)
EOSINOPHIL # BLD AUTO: 0.7 K/UL (ref 0–0.5)
EOSINOPHIL NFR BLD: 3.5 % (ref 0–8)
EOSINOPHIL NFR BLD: 4.7 % (ref 0–8)
ERYTHROCYTE [DISTWIDTH] IN BLOOD BY AUTOMATED COUNT: 15.9 % (ref 11.5–14.5)
ERYTHROCYTE [DISTWIDTH] IN BLOOD BY AUTOMATED COUNT: 15.9 % (ref 11.5–14.5)
EST. GFR  (AFRICAN AMERICAN): >60 ML/MIN/1.73 M^2
EST. GFR  (NON AFRICAN AMERICAN): 55.5 ML/MIN/1.73 M^2
GLUCOSE SERPL-MCNC: 135 MG/DL (ref 70–110)
HCT VFR BLD AUTO: 27 % (ref 37–48.5)
HCT VFR BLD AUTO: 28.8 % (ref 37–48.5)
HCT VFR BLD AUTO: 31.5 % (ref 37–48.5)
HGB BLD-MCNC: 10.1 G/DL (ref 12–16)
HGB BLD-MCNC: 8.8 G/DL (ref 12–16)
HGB BLD-MCNC: 9.4 G/DL (ref 12–16)
IMM GRANULOCYTES # BLD AUTO: 0.14 K/UL (ref 0–0.04)
IMM GRANULOCYTES # BLD AUTO: 0.2 K/UL (ref 0–0.04)
IMM GRANULOCYTES NFR BLD AUTO: 0.9 % (ref 0–0.5)
IMM GRANULOCYTES NFR BLD AUTO: 1.2 % (ref 0–0.5)
INR PPP: 1.1 (ref 0.8–1.2)
LYMPHOCYTES # BLD AUTO: 1.2 K/UL (ref 1–4.8)
LYMPHOCYTES # BLD AUTO: 1.2 K/UL (ref 1–4.8)
LYMPHOCYTES NFR BLD: 7.3 % (ref 18–48)
LYMPHOCYTES NFR BLD: 7.4 % (ref 18–48)
MAGNESIUM SERPL-MCNC: 1.9 MG/DL (ref 1.6–2.6)
MCH RBC QN AUTO: 29.2 PG (ref 27–31)
MCH RBC QN AUTO: 29.3 PG (ref 27–31)
MCHC RBC AUTO-ENTMCNC: 32.6 G/DL (ref 32–36)
MCHC RBC AUTO-ENTMCNC: 32.6 G/DL (ref 32–36)
MCV RBC AUTO: 90 FL (ref 82–98)
MCV RBC AUTO: 90 FL (ref 82–98)
MONOCYTES # BLD AUTO: 1 K/UL (ref 0.3–1)
MONOCYTES # BLD AUTO: 1 K/UL (ref 0.3–1)
MONOCYTES NFR BLD: 6.3 % (ref 4–15)
MONOCYTES NFR BLD: 6.5 % (ref 4–15)
NEUTROPHILS # BLD AUTO: 12.7 K/UL (ref 1.8–7.7)
NEUTROPHILS # BLD AUTO: 13.3 K/UL (ref 1.8–7.7)
NEUTROPHILS NFR BLD: 80 % (ref 38–73)
NEUTROPHILS NFR BLD: 81 % (ref 38–73)
NRBC BLD-RTO: 0 /100 WBC
NRBC BLD-RTO: 0 /100 WBC
PHOSPHATE SERPL-MCNC: 3.1 MG/DL (ref 2.7–4.5)
PLATELET # BLD AUTO: 245 K/UL (ref 150–350)
PLATELET # BLD AUTO: 259 K/UL (ref 150–350)
PMV BLD AUTO: 10.2 FL (ref 9.2–12.9)
PMV BLD AUTO: 9.9 FL (ref 9.2–12.9)
POCT GLUCOSE: 148 MG/DL (ref 70–110)
POCT GLUCOSE: 149 MG/DL (ref 70–110)
POCT GLUCOSE: 164 MG/DL (ref 70–110)
POCT GLUCOSE: 181 MG/DL (ref 70–110)
POCT GLUCOSE: 185 MG/DL (ref 70–110)
POCT GLUCOSE: 189 MG/DL (ref 70–110)
POTASSIUM SERPL-SCNC: 4.3 MMOL/L (ref 3.5–5.1)
PROT SERPL-MCNC: 5.9 G/DL (ref 6–8.4)
PROTHROMBIN TIME: 11.4 SEC (ref 9–12.5)
RBC # BLD AUTO: 3.01 M/UL (ref 4–5.4)
RBC # BLD AUTO: 3.21 M/UL (ref 4–5.4)
SODIUM SERPL-SCNC: 144 MMOL/L (ref 136–145)
WBC # BLD AUTO: 15.81 K/UL (ref 3.9–12.7)
WBC # BLD AUTO: 16.39 K/UL (ref 3.9–12.7)

## 2019-05-15 PROCEDURE — 99900035 HC TECH TIME PER 15 MIN (STAT)

## 2019-05-15 PROCEDURE — C9113 INJ PANTOPRAZOLE SODIUM, VIA: HCPCS | Performed by: STUDENT IN AN ORGANIZED HEALTH CARE EDUCATION/TRAINING PROGRAM

## 2019-05-15 PROCEDURE — 63600175 PHARM REV CODE 636 W HCPCS: Performed by: STUDENT IN AN ORGANIZED HEALTH CARE EDUCATION/TRAINING PROGRAM

## 2019-05-15 PROCEDURE — 97110 THERAPEUTIC EXERCISES: CPT

## 2019-05-15 PROCEDURE — 20000000 HC ICU ROOM

## 2019-05-15 PROCEDURE — 25000003 PHARM REV CODE 250: Performed by: STUDENT IN AN ORGANIZED HEALTH CARE EDUCATION/TRAINING PROGRAM

## 2019-05-15 PROCEDURE — 85025 COMPLETE CBC W/AUTO DIFF WBC: CPT

## 2019-05-15 PROCEDURE — 80053 COMPREHEN METABOLIC PANEL: CPT

## 2019-05-15 PROCEDURE — 97112 NEUROMUSCULAR REEDUCATION: CPT

## 2019-05-15 PROCEDURE — 99232 SBSQ HOSP IP/OBS MODERATE 35: CPT | Mod: ,,, | Performed by: SURGERY

## 2019-05-15 PROCEDURE — 27000221 HC OXYGEN, UP TO 24 HOURS

## 2019-05-15 PROCEDURE — 99232 SBSQ HOSP IP/OBS MODERATE 35: CPT | Mod: ,,, | Performed by: NURSE PRACTITIONER

## 2019-05-15 PROCEDURE — 99900026 HC AIRWAY MAINTENANCE (STAT)

## 2019-05-15 PROCEDURE — 99232 PR SUBSEQUENT HOSPITAL CARE,LEVL II: ICD-10-PCS | Mod: ,,, | Performed by: NURSE PRACTITIONER

## 2019-05-15 PROCEDURE — 85014 HEMATOCRIT: CPT

## 2019-05-15 PROCEDURE — 85018 HEMOGLOBIN: CPT

## 2019-05-15 PROCEDURE — 97530 THERAPEUTIC ACTIVITIES: CPT

## 2019-05-15 PROCEDURE — 94003 VENT MGMT INPAT SUBQ DAY: CPT

## 2019-05-15 PROCEDURE — 94761 N-INVAS EAR/PLS OXIMETRY MLT: CPT

## 2019-05-15 PROCEDURE — 84100 ASSAY OF PHOSPHORUS: CPT

## 2019-05-15 PROCEDURE — 83735 ASSAY OF MAGNESIUM: CPT

## 2019-05-15 PROCEDURE — 85610 PROTHROMBIN TIME: CPT

## 2019-05-15 PROCEDURE — 99232 PR SUBSEQUENT HOSPITAL CARE,LEVL II: ICD-10-PCS | Mod: ,,, | Performed by: SURGERY

## 2019-05-15 RX ORDER — CARVEDILOL 3.12 MG/1
3.12 TABLET ORAL 2 TIMES DAILY
Status: DISCONTINUED | OUTPATIENT
Start: 2019-05-15 | End: 2019-05-20

## 2019-05-15 RX ADMIN — CHLORHEXIDINE GLUCONATE 0.12% ORAL RINSE 15 ML: 1.2 LIQUID ORAL at 08:05

## 2019-05-15 RX ADMIN — CHLORHEXIDINE GLUCONATE 0.12% ORAL RINSE 15 ML: 1.2 LIQUID ORAL at 09:05

## 2019-05-15 RX ADMIN — CARVEDILOL 3.12 MG: 3.12 TABLET, FILM COATED ORAL at 10:05

## 2019-05-15 RX ADMIN — HYDROXYZINE HYDROCHLORIDE 10 MG: 10 SOLUTION ORAL at 01:05

## 2019-05-15 RX ADMIN — LABETALOL HCL IV SOLN PREFILLED SYRINGE 20 MG/4ML (5 MG/ML) 10 MG: 20/4 SOLUTION PREFILLED SYRINGE at 03:05

## 2019-05-15 RX ADMIN — CEFAZOLIN 2 G: 1 INJECTION, POWDER, FOR SOLUTION INTRAMUSCULAR; INTRAVENOUS at 02:05

## 2019-05-15 RX ADMIN — HYDROMORPHONE HYDROCHLORIDE 1 MG: 1 INJECTION, SOLUTION INTRAMUSCULAR; INTRAVENOUS; SUBCUTANEOUS at 06:05

## 2019-05-15 RX ADMIN — SILODOSIN 4 MG: 4 CAPSULE ORAL at 08:05

## 2019-05-15 RX ADMIN — INSULIN ASPART 3 UNITS: 100 INJECTION, SOLUTION INTRAVENOUS; SUBCUTANEOUS at 08:05

## 2019-05-15 RX ADMIN — RIFAMPIN 300 MG: 600 INJECTION, POWDER, LYOPHILIZED, FOR SOLUTION INTRAVENOUS at 03:05

## 2019-05-15 RX ADMIN — PANTOPRAZOLE SODIUM 40 MG: 40 INJECTION, POWDER, FOR SOLUTION INTRAVENOUS at 08:05

## 2019-05-15 RX ADMIN — CEFAZOLIN 2 G: 1 INJECTION, POWDER, FOR SOLUTION INTRAMUSCULAR; INTRAVENOUS at 07:05

## 2019-05-15 RX ADMIN — INSULIN ASPART 3 UNITS: 100 INJECTION, SOLUTION INTRAVENOUS; SUBCUTANEOUS at 03:05

## 2019-05-15 RX ADMIN — CEFAZOLIN 2 G: 1 INJECTION, POWDER, FOR SOLUTION INTRAMUSCULAR; INTRAVENOUS at 10:05

## 2019-05-15 RX ADMIN — HYDRALAZINE HYDROCHLORIDE 10 MG: 20 INJECTION INTRAMUSCULAR; INTRAVENOUS at 10:05

## 2019-05-15 RX ADMIN — INSULIN ASPART 3 UNITS: 100 INJECTION, SOLUTION INTRAVENOUS; SUBCUTANEOUS at 07:05

## 2019-05-15 RX ADMIN — MICONAZOLE NITRATE: 20 OINTMENT TOPICAL at 09:05

## 2019-05-15 RX ADMIN — MICONAZOLE NITRATE: 20 OINTMENT TOPICAL at 08:05

## 2019-05-15 RX ADMIN — ACETAMINOPHEN 650 MG: 325 TABLET ORAL at 11:05

## 2019-05-15 RX ADMIN — INSULIN ASPART 3 UNITS: 100 INJECTION, SOLUTION INTRAVENOUS; SUBCUTANEOUS at 12:05

## 2019-05-15 RX ADMIN — HYDROMORPHONE HYDROCHLORIDE 1 MG: 1 INJECTION, SOLUTION INTRAMUSCULAR; INTRAVENOUS; SUBCUTANEOUS at 12:05

## 2019-05-15 RX ADMIN — ACETAMINOPHEN 650 MG: 325 TABLET ORAL at 03:05

## 2019-05-15 RX ADMIN — CARVEDILOL 3.12 MG: 3.12 TABLET, FILM COATED ORAL at 09:05

## 2019-05-15 RX ADMIN — RIFAMPIN 300 MG: 600 INJECTION, POWDER, LYOPHILIZED, FOR SOLUTION INTRAVENOUS at 02:05

## 2019-05-15 NOTE — PROGRESS NOTES
[facility]  Urology  Progress Note    Patient Name: Mariann Huff  MRN: [id number]  Admission Date: 4/20/2019  Hospital Length of Stay: 25 days  Code Status: Full Code   Attending Provider: Robin Boyd MD   Primary Care Physician: Jasbir Haney MD    Subjective:     HPI:  Mariann Huff is a 59 y.o. female with history of HTN, type 2 diabetes mellitus, CAD, NSTEMI, and back pain who [place] [facility] status and sepsis. She underwent L5-S1 OLIF with NSGY on 4/12 and had intraoperative left ureteral injury with ureteroureteral anastomosis and ureteral stent placement. She did well initially and had correa and MADHURI drain removed on 4/16. She began having chills and altered mental status 2 days ago and this has progressively worsened. No complaints of pain.     She is altered and HPI is limited. In the ED she is febrile to 103 and tachycardic and pressures are low normal. WBC is 5, creatinine is 3.6 baseline 1.0, lactate is 4.6. Cath UA concerning for infection, 3+ LE, >100 WBCs, and many bacteria on microscopy.    CT and MRI abdomen both show air with minimal fluid near the surgical site with left ureter coursing through. There is air throughout the proximal collecting system which is decompressed with JJ ureteral stent in good position.    Taken for ex lap, ligation of left ureter and left neph tube placement on 4/21/19.    Interval History:   Afebrile VSS. Continued bloody BMs overnight. Continues on the vent.    Review of Systems    Objective:     Temp:  [98.5 °F (36.9 °C)-99.8 °F (37.7 °C)] 99 °F (37.2 °C)  Pulse:  [] 91  Resp:  [13-27] 18  SpO2:  [98 %-100 %] 100 %  BP: (125-197)/(55-94) 175/87     Body mass index is 30.45 kg/m².      Bladder Scan Volume (mL): 27 mL (05/10/19 0846)  Post Void Cath Residual Output (mL): 27 mL (05/10/19 0846)    Drains     Drain                 Closed/Suction Drain 04/21/19 0300 LLQ 18 days         Nephrostomy 04/21/19 0629 Left 8 Fr. 18  days         Urethral Catheter 04/20/19 1711 Latex 16 Fr. 18 days         Gastrostomy/Enterostomy 05/02/19 1134 Percutaneous endoscopic gastrostomy (PEG) LUQ decompression;feeding 6 days                Physical Exam   Constitutional: She appears well-developed. No distress.   HENT:   Head: Normocephalic and atraumatic.   Neck: No JVD present.   Cardiovascular: Normal rate and regular rhythm.    Pulmonary/Chest:   On ventilator   Abdominal: Soft. She exhibits no distension. There is no rebound and no guarding.   Incision c/d/i   MADHURI drain with ss output  G-tube   Genitourinary:   Genitourinary Comments: Fontenot in place  Left neph tube with clear yellow urine   Neurological: She is alert.   Skin: Skin is warm and dry. She is not diaphoretic. No pallor.       Significant Labs:    BMP:  Recent Labs   Lab 05/13/19  0246 05/13/19  2234 05/14/19  0300    141 143   K 4.2 4.2 4.4    110 111*   CO2 24 25 24   BUN 40* 42* 41*   CREATININE 1.5* 1.5* 1.3   CALCIUM 8.7 8.9 8.4*       CBC:   Recent Labs   Lab 05/13/19  1200 05/13/19  2234 05/14/19  0300   WBC 16.35* 16.68*  16.68* 16.74*   HGB 8.4* 7.9*  7.9* 6.8*   HCT 27.4* 25.8*  25.8* 22.1*   * 347  347 311       Significant Imaging:  All pertinent imaging results/findings from the past 24 hours have been reviewed.                  Assessment/Plan:     * Ureteral transection of left native kidney  Mariann Huff is a 59 y.o. female s/p left ureteral injury on 4/12, presented with fever, AMS, and intraabdominal abscess, taken for washout and ligation of left ureter with neph tube placement on 4/21, Trach/PEG 5/2.    - Tube feeds per SICU  - Ancef, Rifampin per ID recs   - BAL on 5/8 showed no organisms   - afebrile overnight, WBC was 14.8  - Maintain neph tube, and MADHURI drain, Fontenot removed  - Urine output adequate, creatinine down to 1.1 today   - back on heparin gtt  - diuresis/fluids per SICU    Dispo: continue ICU care    Sepsis  As above        VTE  Risk Mitigation (From admission, onward)        Ordered     IP VTE LOW RISK PATIENT  Once      05/08/19 1315     Place sequential compression device  Until discontinued      04/20/19 3065          Paul Hodges MD  Urology  [facility]

## 2019-05-15 NOTE — ASSESSMENT & PLAN NOTE
ASSESSMENT/PLAN:   Mariann Huff is a 59 y.o. female s/p left ureteral injury on 4/12, who presented with fever, AMS, and intraabdominal abscess, taken for washout and ligation of left ureter with nephrostomy tube placement on 4/21. S/p Trach/PEG on 5/2. Episode of negative pressure pulmonary edema on 5/8 requiring mechanical ventilation, diuresis and low dose pressor. Has been plagued by intermittent BRBPR.     Neuro:   - off sedation  - responds to questions appropriately by nodding  - no focal deficit     Pulmonary:   PAV+ and tolerating well  Vent Mode: Spont  Oxygen Concentration (%):  [39-44] 40  Resp Rate Total:  [12 br/min-32 br/min] 24 br/min  PEEP/CPAP:  [5 cmH20] 5 cmH20  Mean Airway Pressure:  [6.5 cmH20-8.5 cmH20] 7.6 cmH20    - Been tolerating trach collar since 5/3 until episode of flash pulmonary edema on 5/8  - Pulmonary edema likely secondary to occlusion and negative pressure pulmonary edema  - SpO2 % on ventilator  - CXR to follow edema, today is largely the same as yesterday, better inspiration  - ABGs prn  - Continue weaning vent as able     Cardiac:   - HDS at this time.  - TTE 5/8 with no evidence of right heart strain or significant valvular pathology, normal LV systolic function, EF 70%     Renal:    - S/p transection of left ureter 4/12 and subsequent ligation and PCT nephrostomy tube placement with IR 4/21  -Fontenot placed yesterday with 1100 output, then 40-50/hr  - Renal function improving.    - Monitor I/Os       Intake/Output Summary (Last 24 hours) at 5/15/2019 1102  Last data filed at 5/15/2019 0800  Gross per 24 hour   Intake 1388.5 ml   Output 1245 ml   Net 143.5 ml        ID:   - Blood 5/10 NGTD  - Ucx 5/13 candiduria  - BAL 5/18 negative  - C diff 5/5 negative    - Blood cultures 4/12 +E. Coli; sensitivities pending. Repeat Bld Cx show NGTD.   - UCx from 4/20 growing E. Coli; sensitivities are now back. Repeat Cx pending.   - ID following for assistance with abx therapy,  DC'd vanc and zosyn 5/12, started Ancef, continuing rifampin  - AF overnight  - WBC trending down     Hem/Onc:   - H/H stable  - bleeding again this morning (passed rectal tamponade yesterday)  - checking CBC q3 hrs  - Cont to monitor     Endocrine:  - DM Type 2 at baseline    - TF @ goal  - LDSSI  - Endocrine following for assistance with blood glucose control.      Fluids/Electrolytes/Nutrition/GI:   - Free water flushes 300 cc q6h  - Replace electrolytes PRN  - will remove MADHURI tube today     # Shock Liver          - Resolved           - Labs continue to show improvement          - Elevated LFTs now normalized          - Thrombocytopenia - now normalized          - INR normalized to 0.9     # Lactic Acidosis          - Now resolved    # Bloody BM  - continued hematochezia this AM  - CRS following and will revisit this AM     PPx:  - DVT: Lovenox, Hep gtt held for continued bloody BMs        DISPO:  - Continue SICU care  - Preparing for LTACH once bloody BMs are managed

## 2019-05-15 NOTE — SUBJECTIVE & OBJECTIVE
Interval History/Significant Events: Continued intermittent BRBPR. Hgb relatively stable. No indication for transfusion. Surgicel inserted in the AM and then hemostatic agent applied via anoscopy in the afternoon by CRS. She does have normal BM without blood at times.    She was febrile yesterday, but her WBC is normal this morning.    Fontenot removed yesterday, but has not voided. <150 seen on bladder scan overnight. She did have candiduria on the urine cx.    MADHURI drain removed today.    Follow-up For: Procedure(s) (LRB):  SIGMOIDOSCOPY, FLEXIBLE (N/A)    Post-Operative Day: 2 Days Post-Op    Objective:     Vital Signs (Most Recent):  Temp: 99.8 °F (37.7 °C) (05/16/19 0700)  Pulse: 93 (05/16/19 0730)  Resp: (!) 26 (05/16/19 0730)  BP: 122/61 (05/16/19 0730)  SpO2: 99 % (05/16/19 0730) Vital Signs (24h Range):  Temp:  [99.8 °F (37.7 °C)-102.6 °F (39.2 °C)] 99.8 °F (37.7 °C)  Pulse:  [] 93  Resp:  [2-32] 26  SpO2:  [95 %-100 %] 99 %  BP: (111-193)/(59-99) 122/61     Weight: 83 kg (182 lb 15.7 oz)  Body mass index is 30.45 kg/m².      Intake/Output Summary (Last 24 hours) at 5/15/2019 1118  Last data filed at 5/15/2019 0800  Gross per 24 hour   Intake 1388.5 ml   Output 1245 ml   Net 143.5 ml       Physical Exam   Constitutional: She appears well-developed and well-nourished.   HENT:   Head: Normocephalic and atraumatic.   Tracheostomy in place   Eyes: Right eye exhibits no discharge. Left eye exhibits no discharge.   Neck: Normal range of motion. Neck supple.   Cardiovascular: Normal rate and regular rhythm.   Pulmonary/Chest: Effort normal. No respiratory distress.   Abdominal: Soft.   Midline incision c/d/i.  PEG with no leak. Abdomen soft, non-distended.  Bowel sounds present   Genitourinary:   Genitourinary Comments: Nephrostomy tube in place with thin dark yellow output.  Fontenot removed  She has BRBPR   Musculoskeletal: Normal range of motion.   Neurological: She is alert.   Able to follow commands, denying  pain.    Skin: Skin is warm and dry.   Psychiatric: She has a normal mood and affect.   Nursing note and vitals reviewed.      Vents:  PAV+ and tolerating well    Lines/Drains/Airways     Central Venous Catheter Line                 Percutaneous Central Line Insertion/Assessment - triple lumen  05/12/19 1256  3 days          Drain                 Closed/Suction Drain 04/21/19 0300 LLQ 25 days         Nephrostomy 04/21/19 0629 Left 8 Fr. 25 days         Gastrostomy/Enterostomy 05/02/19 1134 Percutaneous endoscopic gastrostomy (PEG) LUQ decompression;feeding 13 days          Airway                 Surgical Airway 05/02/19 1107 Shiley Cuffed 13 days          Peripheral Intravenous Line                 Midline Catheter Insertion/Assessment  - Single Lumen 05/07/19 1632 Left basilic vein (medial side of arm) 18g x 8cm 8 days                Significant Labs:    CBC/Anemia Profile:  Recent Labs   Lab 05/15/19  1326 05/16/19  0009 05/16/19  0328   WBC 16.39* 13.70* 11.07   HGB 8.8* 8.0* 9.5*   HCT 27.0* 25.2* 30.2*    247 199   MCV 90 91 90   RDW 15.9* 15.8* 15.9*        Chemistries:  Recent Labs   Lab 05/15/19  0300 05/16/19  0328    143   K 4.3 4.3   * 112*   CO2 23 24   BUN 33* 37*   CREATININE 1.1 1.1   CALCIUM 8.7 8.6*   ALBUMIN 1.6* 1.5*   PROT 5.9* 5.5*   BILITOT 0.6 0.5   ALKPHOS 120 99   ALT 5* <5*   AST 18 20   MG 1.9 1.8   PHOS 3.1 2.8       Blood Culture: No results for input(s): LABBLOO in the last 48 hours.  Urine Culture: No results for input(s): LABURIN in the last 48 hours.  Urine Studies: No results for input(s): COLORU, APPEARANCEUA, PHUR, SPECGRAV, PROTEINUA, GLUCUA, KETONESU, BILIRUBINUA, OCCULTUA, NITRITE, UROBILINOGEN, LEUKOCYTESUR, RBCUA, WBCUA, BACTERIA, SQUAMEPITHEL, HYALINECASTS in the last 48 hours.    Invalid input(s): WRIGHTSUR  All pertinent labs within the past 24 hours have been reviewed.    Significant Imaging:  I have reviewed all pertinent imaging results/findings  within the past 24 hours.

## 2019-05-15 NOTE — SUBJECTIVE & OBJECTIVE
Subjective:     Interval History: passed packing yesterday, small amt of blood but mainly brown stool since that time    Post-Op Info:  Procedure(s) (LRB):  SIGMOIDOSCOPY, FLEXIBLE (N/A)   2 Days Post-Op      Medications:  Continuous Infusions:  Scheduled Meds:   ceFAZolin (ANCEF) IVPB  2 g Intravenous Q8H    chlorhexidine  15 mL Mouth/Throat BID    insulin aspart U-100  3 Units Subcutaneous Q24H    insulin aspart U-100  3 Units Subcutaneous Q24H    insulin aspart U-100  3 Units Subcutaneous Q24H    insulin aspart U-100  3 Units Subcutaneous Q24H    insulin aspart U-100  3 Units Subcutaneous Q24H    insulin aspart U-100  3 Units Subcutaneous Q24H    miconazole nitrate 2%   Topical (Top) BID    pantoprazole  40 mg Intravenous Daily    rifAMpin (RIFADIN) IVPB  300 mg Intravenous Q12H    silodosin  4 mg Per G Tube Daily     PRN Meds:   sodium chloride    acetaminophen    albuterol-ipratropium    dextrose 50%    glucagon (human recombinant)    hydrALAZINE    HYDROmorphone    hydrOXYzine    insulin aspart U-100    labetalol    magnesium sulfate IVPB    magnesium sulfate IVPB    potassium chloride in water    promethazine (PHENERGAN) IVPB    sodium chloride 0.9%        Objective:     Vital Signs (Most Recent):  Temp: 99.6 °F (37.6 °C) (05/15/19 0700)  Pulse: 91 (05/15/19 0730)  Resp: (!) 24 (05/15/19 0730)  BP: (!) 152/70 (05/15/19 0530)  SpO2: 99 % (05/15/19 0730) Vital Signs (24h Range):  Temp:  [99.3 °F (37.4 °C)-100.6 °F (38.1 °C)] 99.6 °F (37.6 °C)  Pulse:  [] 91  Resp:  [13-31] 24  SpO2:  [96 %-100 %] 99 %  BP: (138-193)/(62-91) 152/70     Intake/Output - Last 3 Shifts       05/13 0700 - 05/14 0659 05/14 0700 - 05/15 0659 05/15 0700 - 05/16 0659    I.V. (mL/kg) 397.2 (4.8) 308.5 (3.7)     Blood  350     NG/GT 1350 1000 40    Total Intake(mL/kg) 1747.2 (21.1) 1658.5 (20) 40 (0.5)    Urine (mL/kg/hr) 1940 (1) 1505 (0.8) 120 (0.8)    Drains 20 5 10    Other       Stool  0     Total  Output 1960 1510 130    Net -212.8 +148.5 -90           Urine Occurrence  1 x     Stool Occurrence  4 x           Physical Exam   Constitutional: No distress.   Neck: Neck supple.   Cardiovascular: Normal rate and regular rhythm.   Pulmonary/Chest: Effort normal. No respiratory distress.   Abdominal: Soft. She exhibits no distension. There is no tenderness.   Musculoskeletal: Normal range of motion.   Neurological: She is alert.   Skin: Skin is warm and dry.       Anorectal Exam:  Anal Skin: anterior ulcer at anal verge from BMS     Digital Rectal Exam:  Palpable rectal ulcer/scaring in right anterior position    Dark brown stool, no bleeding on exam this am      Significant Labs:  BMP (Last 3 Results):   Recent Labs   Lab 05/13/19  0246 05/13/19  2234 05/14/19  0300 05/15/19  0300   * 164* 172* 135*    141 143 144   K 4.2 4.2 4.4 4.3    110 111* 112*   CO2 24 25 24 23   BUN 40* 42* 41* 33*   CREATININE 1.5* 1.5* 1.3 1.1   CALCIUM 8.7 8.9 8.4* 8.7   MG 2.2  --  2.0 1.9     CBC (Last 3 Results):   Recent Labs   Lab 05/14/19  1058 05/14/19  1651 05/14/19  2359   WBC 17.72* 16.45* 15.81*   RBC 3.48* 3.40* 3.21*   HGB 10.0* 10.1* 9.4*   HCT 31.1* 30.8* 28.8*    270 259   MCV 89 91 90   MCH 28.7 29.7 29.3   MCHC 32.2 32.8 32.6       Significant Diagnostics:  I have reviewed all pertinent imaging results/findings within the past 24 hours.

## 2019-05-15 NOTE — PLAN OF CARE
Problem: Adult Inpatient Plan of Care  Goal: Plan of Care Review  Outcome: Ongoing (interventions implemented as appropriate)  VSS. Gtts: MIV at 5. Tube feeding continues at 40 ml/hr with no residual or complications. Pt remains on the ventilator, on spontaneous PAV at 40% and 5 PEEP. Pt is AAO, will move all extremities and follow all commands. Pt had 2 large bright red bowel rectal bleeds before 1100 am, 1 small dark brown at 1800. No vomiting or nausea. Plan of care reviewed with the patient and family and all questions and concerns addressed. Will continue to monitor pt.

## 2019-05-15 NOTE — PT/OT/SLP PROGRESS
Occupational Therapy   Treatment    Name: Mariann Huff  MRN: [id number]  Admitting Diagnosis:  Ureteral transection of left native kidney  2 Days Post-Op    Recommendations:     Discharge Recommendations: rehabilitation facility  Discharge Equipment Recommendations:  (TBD)  Barriers to discharge:  Decreased caregiver support(at current level of function)    Assessment:     Mariann Huff is a 59 y.o. female with a medical diagnosis of Ureteral transection of left native kidney.  She presents with improved static sitting balance at EOB allowing for increased time at EOB and participation in grooming and UE/LE exercises. Pt still requiring total A for bed mobility and total A x 2 for standing. Performance deficits affecting function are weakness, impaired endurance, impaired functional mobilty, impaired self care skills, pain, decreased lower extremity function, decreased upper extremity function, impaired cardiopulmonary response to activity, gait instability, impaired balance.     Rehab Prognosis:  Fair; patient would benefit from acute skilled OT services to address these deficits and reach maximum level of function.       Plan:     Patient to be seen 4 x/week to address the above listed problems via self-care/home management, therapeutic activities, therapeutic exercises  · Plan of Care Expires: 06/05/19  · Plan of Care Reviewed with: patient    Subjective     Pain/Comfort:  · Pain Rating 1: 5/10  · Location - Side 1: Right  · Location - Orientation 1: generalized  · Location 1: arm    Objective:     Communicated with: RN prior to session.  Patient found HOB elevated with ventilator, pulse ox (continuous), telemetry, central line, oxygen, PEG Tube, nephrostomy, tracheostomy, correa catheter, blood pressure cuff upon OT entry to room.    General Precautions: Standard, fall   Orthopedic Precautions:N/A   Braces: N/A     Occupational Performance:     Bed Mobility:    · Patient completed Rolling/Turning to Left with   total assistance  · Patient completed Scooting/Bridging with maximal assistance  · Patient completed Supine to Sit with total assistance  · Patient completed Sit to Supine with total assistance   · Pt seated EOB ~10 minutes with CGA to max A     Functional Mobility/Transfers:  · Patient completed Sit <> Stand Transfer with total assistance and of 2 persons  with  no assistive device   · Functional Mobility: NT    Activities of Daily Living:  · Grooming: stand by assistance to wash face while seated EOB  · Toileting: total assistance for clothing management and hygiene      [facility] 6 Click ADL: 10    Treatment & Education:  BUE AROM exercises 10 x 1 for shoulder flex to 90*, elbow flex/ext, shoulder shrugs, and chest press  Pt educated on role of OT/POC  Pt educated on importance of EOB/OOB activity and UE/LE exercises  White board/communication board updated    Patient left HOB elevated with all lines intact, call button in reach and RN, PT presentEducation:      GOALS:   Multidisciplinary Problems     Occupational Therapy Goals        Problem: Occupational Therapy Goal    Goal Priority Disciplines Outcome Interventions   Occupational Therapy Goal     OT, PT/OT Ongoing (interventions implemented as appropriate)    Description:  Goals to be met by: 5/20/19     Patient will increase functional independence with ADLs by performing:    UE Dressing with Set-up Assistance.  LE Dressing with Maximum Assistance and Assistive Devices as needed.  Grooming while standing with Minimal Assistance.  Toileting from bedside commode with Minimal Assistance for hygiene and clothing management.   Sitting at edge of bed x10 minutes with Supervision.  Rolling to Bilateral with Minimal Assistance.   Supine to sit with Minimal Assistance.  Toilet transfer to bedside commode with Minimal Assistance.                      Time Tracking:     OT Date of Treatment: 05/15/19  OT Start Time: 0912  OT Stop Time: 0936  OT Total Time (min): 24  min    Billable Minutes:Therapeutic Exercise 14  Neuromuscular Re-education 10    Ana Miller OT  5/15/2019

## 2019-05-15 NOTE — PROGRESS NOTES
[facility]  Critical Care - Surgery  Progress Note    Patient Name: Mariann Huff  MRN: [id number]  Admission Date: 4/20/2019  Hospital Length of Stay: 25 days  Code Status: Full Code  Attending Provider: Robin Boyd MD  Primary Care Provider: Jasbir Haney MD   Principal Problem: Ureteral transection of left native kidney    Subjective:     Hospital/ICU Course:  The patient has had 2 episodes of bradycardia during the day.  Early in the morning, the patient had difficulty breathing and a mucus plug was found in the inner cannula of the tracheostomy.  She developed bradycardia but never arrested.  She also developed hypotension.  This was followed subsequently after she was bagged (Ambu) with tachypnea and hypertension and tachycardia.  Oxygen saturations were in the low 90s, and the arterial blood gas at that time revealed a significant alveolar arterial gradient with adequate ventilation.  Chest x-ray, which I personally reviewed, revealed what appeared to be a significant fluid overload.  We did perform an echocardiogram urgently, which I personally was present and reviewed the results.  There is no evidence of heart failure.  EKG was reported as normal and there is no evidence that the patient had an acute myocardial event.  The chest x-ray did suggest a significant amount of pulmonary edema. There was also a concern, because the the history of upper body deep venous thrombosis, that this could be a pulmonary embolism.  A CT scan of the chest , which I have personally reviewed show bilateral infiltrates compatible with pulmonary edema of non cardiogenic origin.  It was not possible to rule out a pulmonary embolism.  The patient is anticoagulated currently.  Ultrasound of the lower extremities was done and they do not show any deep venous thrombosis.    The patient did receive some fluids, because of her history of acute kidney injury, which has resolved and because she was receiving IV  contrast.  She has maintained a good urinary output during the day.  She has mostly being hemodynamically stable and her tachycardia resolved.  She remains sedated and on the ventilator.  Follow-up chest x-ray, which I personally reviewed, showed resolving pulmonary edema. Her oxygenation and oxygen saturations improved and we were able to wean the patient FiO2 down to 50%.    Of note, I did perform a bronchoscopy, which failed to reveal any amount of pus or mucus plugging.  There was also no evidence of aspiration.  BAL was performed and was sent.    A 2nd episode of bradycardia was observed in the afternoon.  This responded to the use of atropine and 0.1 mg of epinephrine.  She did have a tachycardic response with this which subsided rapidly.  She remained otherwise hemodynamically stable.  Arterial blood gases, reveal now a PO2 of 300 with adequate pCO2 and pH.  Mild hyperventilation.  She remains comfortable.  I have asked Cardiology to re-evaluate this patient.  For now she remains off pressors.    Neurologically the patient has been intact. She is awake alert and oriented with a nonfocal exam.    Her abdomen remains soft.  She has been NPO for now.  She has not had any further episodes of lower GI bleed.    The patient is having a good urinary output BUN and creatinine have been grossly within normal limits with a slight elevation of BUN.  Electrolytes are being followed and replaced as necessary.    Currently no evidence of infection in this patient.  Empirically I started antibiotics, but will stop them in the next 24-48 hours.    I have had the chance to talk to the patient's  at length and in detail.  I have explained our findings, I will keep him up-to-date as to any progress in understanding the etiology of these episodes and will wait continuing to talk to him as often as necessary.      Interval History/Significant Events: BMx3 yesterday afternoon with no blood and Hgb stable. However, this  morning she has had two episodes of bleeding. Hgb has so far been stable at 10.1. Will check closely.    On PAV+    Had candiduria. Fontenot removed this AM after two doses of silodosin.    Follow-up For: Procedure(s) (LRB):  SIGMOIDOSCOPY, FLEXIBLE (N/A)    Post-Operative Day: 2 Days Post-Op    Objective:     Vital Signs (Most Recent):  Temp: 99.6 °F (37.6 °C) (05/15/19 0700)  Pulse: (!) 119 (05/15/19 1041)  Resp: 14 (05/15/19 0903)  BP: (!) 191/97 (05/15/19 1041)  SpO2: 98 % (05/15/19 0903) Vital Signs (24h Range):  Temp:  [99.3 °F (37.4 °C)-100.6 °F (38.1 °C)] 99.6 °F (37.6 °C)  Pulse:  [] 119  Resp:  [8-31] 14  SpO2:  [96 %-100 %] 98 %  BP: (138-193)/(62-97) 191/97     Weight: 83 kg (182 lb 15.7 oz)  Body mass index is 30.45 kg/m².      Intake/Output Summary (Last 24 hours) at 5/15/2019 1059  Last data filed at 5/15/2019 0800  Gross per 24 hour   Intake 1398.5 ml   Output 1310 ml   Net 88.5 ml       Physical Exam   Constitutional: She appears well-developed and well-nourished.   HENT:   Head: Normocephalic and atraumatic.   Tracheostomy in place   Eyes: Right eye exhibits no discharge. Left eye exhibits no discharge.   Neck: Normal range of motion. Neck supple.   Cardiovascular: Normal rate and regular rhythm.   Pulmonary/Chest: Effort normal. No respiratory distress.   Abdominal: Soft.   Midline incision c/d/i.  PEG with no leak. Abdomen soft, non-distended.  Bowel sounds present    MADHURI drain with minimal serous output   Genitourinary:   Genitourinary Comments: Nephrostomy tube in place with watery dark yellow output.  Fontenot removed  She has BRBPR   Musculoskeletal: Normal range of motion.   Neurological: She is alert.   Able to follow commands, denying pain.    Skin: Skin is warm and dry.   Psychiatric: She has a normal mood and affect.   Nursing note and vitals reviewed.      Vents: PAV+  Vent Mode: Spont (05/15/19 0903)  Ventilator Initiated: Yes (05/08/19 0630)  Set Rate: 18 bmp (05/13/19 0742)  Vt Set:  420 mL (05/13/19 0742)  Pressure Support: 0 cmH20 (04/29/19 1014)  PEEP/CPAP: 5 cmH20 (05/15/19 0903)  Oxygen Concentration (%): 40 (05/15/19 0903)  Peak Airway Pressure: 17 cmH2O (05/15/19 0903)  Plateau Pressure: 0 cmH20 (05/15/19 0903)  Total Ve: 8.19 mL (05/15/19 0903)  Negative Inspiratory Force (cm H2O): -18 (04/27/19 1306)  F/VT Ratio<105 (RSBI): (!) 41.67 (05/15/19 0903)    Lines/Drains/Airways     Central Venous Catheter Line                 Percutaneous Central Line Insertion/Assessment - triple lumen  05/12/19 1256  2 days          Drain                 Closed/Suction Drain 04/21/19 0300 LLQ 24 days         Nephrostomy 04/21/19 0629 Left 8 Fr. 24 days         Gastrostomy/Enterostomy 05/02/19 1134 Percutaneous endoscopic gastrostomy (PEG) LUQ decompression;feeding 12 days         Urethral Catheter 05/13/19 1830 1 day          Airway                 Surgical Airway 05/02/19 1107 Shiley Cuffed 12 days          Peripheral Intravenous Line                 Midline Catheter Insertion/Assessment  - Single Lumen 05/07/19 1632 Left basilic vein (medial side of arm) 18g x 8cm 7 days                Significant Labs:    CBC/Anemia Profile:  Recent Labs   Lab 05/14/19  1058 05/14/19  1651 05/14/19  2359 05/15/19  1012   WBC 17.72* 16.45* 15.81*  --    HGB 10.0* 10.1* 9.4* 10.1*   HCT 31.1* 30.8* 28.8*  --     270 259  --    MCV 89 91 90  --    RDW 15.6* 15.8* 15.9*  --         Chemistries:  Recent Labs   Lab 05/13/19  2234 05/14/19  0300 05/15/19  0300    143 144   K 4.2 4.4 4.3    111* 112*   CO2 25 24 23   BUN 42* 41* 33*   CREATININE 1.5* 1.3 1.1   CALCIUM 8.9 8.4* 8.7   ALBUMIN 1.7* 1.6* 1.6*   PROT 6.0 5.6* 5.9*   BILITOT 0.7 0.6 0.6   ALKPHOS 111 100 120   ALT 7* 6* 5*   AST 16 16 18   MG  --  2.0 1.9   PHOS  --  3.1 3.1       Significant Imaging:  None    Assessment/Plan:     * Ureteral transection of left native kidney  ASSESSMENT/PLAN:   Mariann Huff is a 59 y.o. female s/p left ureteral  injury on 4/12, who presented with fever, AMS, and intraabdominal abscess, taken for washout and ligation of left ureter with nephrostomy tube placement on 4/21. S/p Trach/PEG on 5/2. Episode of negative pressure pulmonary edema on 5/8 requiring mechanical ventilation, diuresis and low dose pressor. Has been plagued by intermittent BRBPR.     Neuro:   - off sedation  - responds to questions appropriately by nodding  - no focal deficit     Pulmonary:   PAV+ and tolerating well  Vent Mode: Spont  Oxygen Concentration (%):  [39-44] 40  Resp Rate Total:  [12 br/min-32 br/min] 24 br/min  PEEP/CPAP:  [5 cmH20] 5 cmH20  Mean Airway Pressure:  [6.5 cmH20-8.5 cmH20] 7.6 cmH20    - Been tolerating trach collar since 5/3 until episode of flash pulmonary edema on 5/8  - Pulmonary edema likely secondary to occlusion and negative pressure pulmonary edema  - SpO2 % on ventilator  - CXR to follow edema, today is largely the same as yesterday, better inspiration  - ABGs prn  - Continue weaning vent as able     Cardiac:   - HDS at this time.  - TTE 5/8 with no evidence of right heart strain or significant valvular pathology, normal LV systolic function, EF 70%     Renal:    - S/p transection of left ureter 4/12 and subsequent ligation and PCT nephrostomy tube placement with IR 4/21  -Fontenot placed yesterday with 1100 output, then 40-50/hr  - Renal function improving.    - Monitor I/Os       Intake/Output Summary (Last 24 hours) at 5/15/2019 1102  Last data filed at 5/15/2019 0800  Gross per 24 hour   Intake 1388.5 ml   Output 1245 ml   Net 143.5 ml        ID:   - Blood 5/10 NGTD  - Ucx 5/13 candiduria  - BAL 5/18 negative  - C diff 5/5 negative    - Blood cultures 4/12 +E. Coli; sensitivities pending. Repeat Bld Cx show NGTD.   - UCx from 4/20 growing E. Coli; sensitivities are now back. Repeat Cx pending.   - ID following for assistance with abx therapy, DC'd vanc and zosyn 5/12, started Ancef, continuing rifampin  - AF  overnight  - WBC trending down     Hem/Onc:   - H/H stable  - bleeding again this morning (passed rectal tamponade yesterday)  - checking CBC q3 hrs  - Cont to monitor     Endocrine:  - DM Type 2 at baseline    - TF @ goal  - LDSSI  - Endocrine following for assistance with blood glucose control.      Fluids/Electrolytes/Nutrition/GI:   - Free water flushes 300 cc q6h  - Replace electrolytes PRN  - will remove MADHURI tube today     # Shock Liver          - Resolved           - Labs continue to show improvement          - Elevated LFTs now normalized          - Thrombocytopenia - now normalized          - INR normalized to 0.9     # Lactic Acidosis          - Now resolved    # Bloody BM  - continued hematochezia this AM  - CRS following and will revisit this AM     PPx:  - DVT: Lovenox, Hep gtt held for continued bloody BMs        DISPO:  - Continue SICU care  - Preparing for LTACH once bloody BMs are managed                Critical secondary to Patient has a condition that poses threat to life and bodily function: Severe Respiratory Distress and active lower GI bleed     Critical care was time spent personally by me on the following activities: development of treatment plan with patient or surrogate and bedside caregivers, discussions with consultants, evaluation of patient's response to treatment, examination of patient, ordering and performing treatments and interventions, ordering and review of laboratory studies, ordering and review of radiographic studies, pulse oximetry, re-evaluation of patient's condition.  This critical care time did not overlap with that of any other provider or involve time for any procedures.     ISABELLE Carter MD  Critical Care - Surgery  [facility]

## 2019-05-15 NOTE — PROGRESS NOTES
[facility]  Colorectal Surgery  Progress Note    Patient Name: Mariann Huff  MRN: [id number]  Admission Date: 4/20/2019  Hospital Length of Stay: 25 days  Attending Physician: Robin Boyd MD    Subjective:     Interval History: passed packing yesterday, small amt of blood but mainly brown stool since that time    Post-Op Info:  Procedure(s) (LRB):  SIGMOIDOSCOPY, FLEXIBLE (N/A)   2 Days Post-Op      Medications:  Continuous Infusions:  Scheduled Meds:   ceFAZolin (ANCEF) IVPB  2 g Intravenous Q8H    chlorhexidine  15 mL Mouth/Throat BID    insulin aspart U-100  3 Units Subcutaneous Q24H    insulin aspart U-100  3 Units Subcutaneous Q24H    insulin aspart U-100  3 Units Subcutaneous Q24H    insulin aspart U-100  3 Units Subcutaneous Q24H    insulin aspart U-100  3 Units Subcutaneous Q24H    insulin aspart U-100  3 Units Subcutaneous Q24H    miconazole nitrate 2%   Topical (Top) BID    pantoprazole  40 mg Intravenous Daily    rifAMpin (RIFADIN) IVPB  300 mg Intravenous Q12H    silodosin  4 mg Per G Tube Daily     PRN Meds:   sodium chloride    acetaminophen    albuterol-ipratropium    dextrose 50%    glucagon (human recombinant)    hydrALAZINE    HYDROmorphone    hydrOXYzine    insulin aspart U-100    labetalol    magnesium sulfate IVPB    magnesium sulfate IVPB    potassium chloride in water    promethazine (PHENERGAN) IVPB    sodium chloride 0.9%        Objective:     Vital Signs (Most Recent):  Temp: 99.6 °F (37.6 °C) (05/15/19 0700)  Pulse: 91 (05/15/19 0730)  Resp: (!) 24 (05/15/19 0730)  BP: (!) 152/70 (05/15/19 0530)  SpO2: 99 % (05/15/19 0730) Vital Signs (24h Range):  Temp:  [99.3 °F (37.4 °C)-100.6 °F (38.1 °C)] 99.6 °F (37.6 °C)  Pulse:  [] 91  Resp:  [13-31] 24  SpO2:  [96 %-100 %] 99 %  BP: (138-193)/(62-91) 152/70     Intake/Output - Last 3 Shifts       05/13 0700 - 05/14 0659 05/14 0700 - 05/15 0659 05/15 0700 - 05/16 0659    I.V. (mL/kg) 397.2  (4.8) 308.5 (3.7)     Blood  350     NG/GT 1350 1000 40    Total Intake(mL/kg) 1747.2 (21.1) 1658.5 (20) 40 (0.5)    Urine (mL/kg/hr) 1940 (1) 1505 (0.8) 120 (0.8)    Drains 20 5 10    Other       Stool  0     Total Output 1960 1510 130    Net -212.8 +148.5 -90           Urine Occurrence  1 x     Stool Occurrence  4 x           Physical Exam   Constitutional: No distress.   Neck: Neck supple.   Cardiovascular: Normal rate and regular rhythm.   Pulmonary/Chest: Effort normal. No respiratory distress.   Abdominal: Soft. She exhibits no distension. There is no tenderness.   Musculoskeletal: Normal range of motion.   Neurological: She is alert.   Skin: Skin is warm and dry.       Anorectal Exam:  Anal Skin: anterior ulcer at anal verge from BMS     Digital Rectal Exam:  Palpable rectal ulcer/scaring in right anterior position    Dark brown stool, no bleeding on exam this am      Significant Labs:  BMP (Last 3 Results):   Recent Labs   Lab 05/13/19  0246 05/13/19  2234 05/14/19  0300 05/15/19  0300   * 164* 172* 135*    141 143 144   K 4.2 4.2 4.4 4.3    110 111* 112*   CO2 24 25 24 23   BUN 40* 42* 41* 33*   CREATININE 1.5* 1.5* 1.3 1.1   CALCIUM 8.7 8.9 8.4* 8.7   MG 2.2  --  2.0 1.9     CBC (Last 3 Results):   Recent Labs   Lab 05/14/19  1058 05/14/19  1651 05/14/19  2359   WBC 17.72* 16.45* 15.81*   RBC 3.48* 3.40* 3.21*   HGB 10.0* 10.1* 9.4*   HCT 31.1* 30.8* 28.8*    270 259   MCV 89 91 90   MCH 28.7 29.7 29.3   MCHC 32.2 32.8 32.6       Significant Diagnostics:  I have reviewed all pertinent imaging results/findings within the past 24 hours.    Assessment/Plan:     BRBPR (bright red blood per rectum)  Ms. Huff is a 60 yo F with PMH of HTN, DM II, CAD, NSTEMI, s/p L5-S1 OLIF with NSGY 4/12 c/b intraoperative left ureteral injury and underwent ureteroureteral anastomoses and ureteral stent placement complicated by sepsis due to E.coli septicemia now s/p ex lap on 4/21 and PEG/Trach of the  vent now having BRBPR.     S/p flex sig on 5/13  Cont quickclot/surgicell packing as needed  Transfuse as needed  cont to hold hep gtt  Please call with any questions or concerns               John Barker MD  Colorectal Surgery  [facility]

## 2019-05-15 NOTE — SUBJECTIVE & OBJECTIVE
Interval History/Significant Events: BMx3 yesterday afternoon with no blood and Hgb stable. However, this morning she has had two episodes of bleeding. Hgb has so far been stable at 10.1. Will check closely.    On PAV+    Had candiduria. Fontenot removed this AM after two doses of silodosin.    Follow-up For: Procedure(s) (LRB):  SIGMOIDOSCOPY, FLEXIBLE (N/A)    Post-Operative Day: 2 Days Post-Op    Objective:     Vital Signs (Most Recent):  Temp: 99.6 °F (37.6 °C) (05/15/19 0700)  Pulse: (!) 119 (05/15/19 1041)  Resp: 14 (05/15/19 0903)  BP: (!) 191/97 (05/15/19 1041)  SpO2: 98 % (05/15/19 0903) Vital Signs (24h Range):  Temp:  [99.3 °F (37.4 °C)-100.6 °F (38.1 °C)] 99.6 °F (37.6 °C)  Pulse:  [] 119  Resp:  [8-31] 14  SpO2:  [96 %-100 %] 98 %  BP: (138-193)/(62-97) 191/97     Weight: 83 kg (182 lb 15.7 oz)  Body mass index is 30.45 kg/m².      Intake/Output Summary (Last 24 hours) at 5/15/2019 1059  Last data filed at 5/15/2019 0800  Gross per 24 hour   Intake 1398.5 ml   Output 1310 ml   Net 88.5 ml       Physical Exam   Constitutional: She appears well-developed and well-nourished.   HENT:   Head: Normocephalic and atraumatic.   Tracheostomy in place   Eyes: Right eye exhibits no discharge. Left eye exhibits no discharge.   Neck: Normal range of motion. Neck supple.   Cardiovascular: Normal rate and regular rhythm.   Pulmonary/Chest: Effort normal. No respiratory distress.   Abdominal: Soft.   Midline incision c/d/i.  PEG with no leak. Abdomen soft, non-distended.  Bowel sounds present    MADHURI drain with minimal serous output   Genitourinary:   Genitourinary Comments: Nephrostomy tube in place with watery dark yellow output.  Fontenot removed  She has BRBPR   Musculoskeletal: Normal range of motion.   Neurological: She is alert.   Able to follow commands, denying pain.    Skin: Skin is warm and dry.   Psychiatric: She has a normal mood and affect.   Nursing note and vitals reviewed.      Vents: PAV+  Vent Mode:  Spont (05/15/19 0903)  Ventilator Initiated: Yes (05/08/19 0630)  Set Rate: 18 bmp (05/13/19 0742)  Vt Set: 420 mL (05/13/19 0742)  Pressure Support: 0 cmH20 (04/29/19 1014)  PEEP/CPAP: 5 cmH20 (05/15/19 0903)  Oxygen Concentration (%): 40 (05/15/19 0903)  Peak Airway Pressure: 17 cmH2O (05/15/19 0903)  Plateau Pressure: 0 cmH20 (05/15/19 0903)  Total Ve: 8.19 mL (05/15/19 0903)  Negative Inspiratory Force (cm H2O): -18 (04/27/19 1306)  F/VT Ratio<105 (RSBI): (!) 41.67 (05/15/19 0903)    Lines/Drains/Airways     Central Venous Catheter Line                 Percutaneous Central Line Insertion/Assessment - triple lumen  05/12/19 1256  2 days          Drain                 Closed/Suction Drain 04/21/19 0300 LLQ 24 days         Nephrostomy 04/21/19 0629 Left 8 Fr. 24 days         Gastrostomy/Enterostomy 05/02/19 1134 Percutaneous endoscopic gastrostomy (PEG) LUQ decompression;feeding 12 days         Urethral Catheter 05/13/19 1830 1 day          Airway                 Surgical Airway 05/02/19 1107 Shiley Cuffed 12 days          Peripheral Intravenous Line                 Midline Catheter Insertion/Assessment  - Single Lumen 05/07/19 1632 Left basilic vein (medial side of arm) 18g x 8cm 7 days                Significant Labs:    CBC/Anemia Profile:  Recent Labs   Lab 05/14/19  1058 05/14/19  1651 05/14/19  2359 05/15/19  1012   WBC 17.72* 16.45* 15.81*  --    HGB 10.0* 10.1* 9.4* 10.1*   HCT 31.1* 30.8* 28.8*  --     270 259  --    MCV 89 91 90  --    RDW 15.6* 15.8* 15.9*  --         Chemistries:  Recent Labs   Lab 05/13/19  2234 05/14/19  0300 05/15/19  0300    143 144   K 4.2 4.4 4.3    111* 112*   CO2 25 24 23   BUN 42* 41* 33*   CREATININE 1.5* 1.3 1.1   CALCIUM 8.9 8.4* 8.7   ALBUMIN 1.7* 1.6* 1.6*   PROT 6.0 5.6* 5.9*   BILITOT 0.7 0.6 0.6   ALKPHOS 111 100 120   ALT 7* 6* 5*   AST 16 16 18   MG  --  2.0 1.9   PHOS  --  3.1 3.1       Significant Imaging:  None

## 2019-05-15 NOTE — SUBJECTIVE & OBJECTIVE
"Interval HPI:   Overnight events: Remains in SICU. NAEON. BG within goal ranges on scheduled Novolog and correction scale coverage.  Eating:   NPO  Nausea: No  Hypoglycemia and intervention: No  Fever: Yes (100.8)   TPN and/or TF: Yes  If yes, type of TF/TPN and rate: Peptamen @ 40 cc/hr    /65 (BP Location: Left arm, Patient Position: Lying)   Pulse 97   Temp (!) 100.8 °F (38.2 °C)   Resp (!) 29   Ht 5' 5" (1.651 m)   Wt 83 kg (182 lb 15.7 oz)   SpO2 98%   Breastfeeding? No   BMI 30.45 kg/m²     Labs Reviewed and Include    Recent Labs   Lab 05/15/19  0300   *   CALCIUM 8.7   ALBUMIN 1.6*   PROT 5.9*      K 4.3   CO2 23   *   BUN 33*   CREATININE 1.1   ALKPHOS 120   ALT 5*   AST 18   BILITOT 0.6     Lab Results   Component Value Date    WBC 16.39 (H) 05/15/2019    HGB 8.8 (L) 05/15/2019    HCT 27.0 (L) 05/15/2019    MCV 90 05/15/2019     05/15/2019     No results for input(s): TSH, FREET4 in the last 168 hours.  Lab Results   Component Value Date    HGBA1C 10.8 (H) 02/19/2019       Nutritional status:   Body mass index is 30.45 kg/m².  Lab Results   Component Value Date    ALBUMIN 1.6 (L) 05/15/2019    ALBUMIN 1.6 (L) 05/14/2019    ALBUMIN 1.7 (L) 05/13/2019     No results found for: PREALBUMIN    Estimated Creatinine Clearance: 59.3 mL/min (based on SCr of 1.1 mg/dL).    Accu-Checks  Recent Labs     05/14/19  0011 05/14/19  0336 05/14/19  0735 05/14/19  1048 05/14/19  1644 05/14/19  2013 05/14/19  2359 05/15/19  0331 05/15/19  0733 05/15/19  1219   POCTGLUCOSE 207* 205* 161* 150* 155* 132* 164* 149* 148* 189*       Current Medications and/or Treatments Impacting Glycemic Control  Immunotherapy:    Immunosuppressants     None        Steroids:   Hormones (From admission, onward)    None        Pressors:    Autonomic Drugs (From admission, onward)    None        Hyperglycemia/Diabetes Medications:   Antihyperglycemics (From admission, onward)    Start     Stop Route Frequency " Ordered    05/11/19 1200  insulin aspart U-100 pen 3 Units      -- SubQ Every 24 hours (non-standard times) 05/10/19 1351    05/11/19 0800  insulin aspart U-100 pen 3 Units      -- SubQ Every 24 hours (non-standard times) 05/10/19 1351    05/11/19 0400  insulin aspart U-100 pen 3 Units      -- SubQ Every 24 hours (non-standard times) 05/10/19 1351    05/11/19 0000  insulin aspart U-100 pen 3 Units      -- SubQ Every 24 hours (non-standard times) 05/10/19 1351    05/10/19 2000  insulin aspart U-100 pen 3 Units      -- SubQ Every 24 hours (non-standard times) 05/10/19 1351    05/10/19 1600  insulin aspart U-100 pen 3 Units      -- SubQ Every 24 hours (non-standard times) 05/10/19 1351    05/10/19 1450  insulin aspart U-100 pen 0-5 Units      -- SubQ Every 4 hours PRN 05/10/19 1351

## 2019-05-15 NOTE — PT/OT/SLP PROGRESS
Physical Therapy  Co-Treatment with OT    Patient Name:  Mariann Huff   MRN:  [id number]    Recommendations:     Discharge Recommendations:  rehabilitation facility   Discharge Equipment Recommendations: (will determine DME needs closer to discharge)   Barriers to discharge: Decreased caregiver support family will not be able to assist at current functional level.     Assessment:     Mariann Huff is a 59 y.o. female admitted with a medical diagnosis of Ureteral transection of left native kidney.  She presents with the following impairments/functional limitations:  weakness, gait instability, impaired balance, impaired endurance, impaired functional mobilty, decreased lower extremity function, pain  Pt kenyetta treatment better being able to sit on EOB with less assistance. Pt will benefit from cont skilled PT  4x/wk to progress physically. Pt will need inpt rehab when medically stable. Pt is s/p exp lap, ligation L ureter 4/21, trach/PEG 5/2, back sx 4/12/19 (previous admit).    Rehab Prognosis: Good; patient would benefit from acute skilled PT services to address these deficits and reach maximum level of function.    Recent Surgery: Procedure(s) (LRB):  SIGMOIDOSCOPY, FLEXIBLE (N/A) 2 Days Post-Op    Plan:     During this hospitalization, patient to be seen 4 x/week to address the identified rehab impairments via gait training, therapeutic activities, therapeutic exercises, neuromuscular re-education and progress toward the following goals:    · Plan of Care Expires:  06/03/19    Subjective     Chief Complaint: pt c/o pain during treatment.   Patient/Family Comments/goals:  To get better and go home.  Pain/Comfort:  · Pain Rating 1: 5/10  · Location - Side 1: Left  · Location 1: (arm)  · Pain Rating Post-Intervention 1: 5/10      Objective:     Communicated with nurse prior to session.  Patient found supine with telemetry, blood pressure cuff, pulse ox (continuous), PEG Tube, MADHURI drain, tracheostomy, ventilator,  correa catheter(L IJ dialysis catheter, CVP, t-tube) upon PT entry to room.     General Precautions: Standard, fall   Orthopedic Precautions:    Braces: LSO     Functional Mobility:  · Bed Mobility:     · Rolling Right: total assistance  · Sit to Supine: total assistance  · Supine to sit: total assistance  ·   · Transfers:     · Sit to Stand:  maximal assistance and of 2 persons with hand-held assist   ·   · Balance: pt sat on EOB x 10 min with CGA for static sitting balance and max assist for dynamic sitting balance.       AM-PAC 6 CLICK MOBILITY  Turning over in bed (including adjusting bedclothes, sheets and blankets)?: 2  Sitting down on and standing up from a chair with arms (e.g., wheelchair, bedside commode, etc.): 2  Moving from lying on back to sitting on the side of the bed?: 2  Moving to and from a bed to a chair (including a wheelchair)?: 2  Climbing 3-5 steps with a railing?: 1       Therapeutic Activities and Exercises:   pt performed AROM BLE x 10 reps in sitting.     Patient left left sidelying with all lines intact, call button in reach and nursing present. Pt was bleeding from rectum.     GOALS:   Multidisciplinary Problems     Physical Therapy Goals        Problem: Physical Therapy Goal    Goal Priority Disciplines Outcome Goal Variances Interventions   Physical Therapy Goal     PT, PT/OT      Description:  Goals to be met by: 19    Patient will increase functional independence with mobility by performin. Supine to sit with Moderate Assistance -not met  2. Sit to stand transfer with Minimal Assistance -not met  3. Gait  x 30 feet with Contact Guard Assistance using Rolling Walker. -not met  4. Lower extremity exercise program x15 reps , with assistance as needed-not met                      Time Tracking:     PT Received On: 05/15/19  PT Start Time: 916     PT Stop Time: 943  PT Total Time (min): 27 min     Billable Minutes: Therapeutic Activity 14 min and Therapeutic Exercise 13  min    Treatment Type: Other (see comments)(co-treatment with OT)  PT/PTA: PT     PTA Visit Number: 0     Cathy Taylor, PT  05/15/2019

## 2019-05-15 NOTE — PROGRESS NOTES
"[facility]  Endocrinology  Progress Note    Admit Date: 4/20/2019     Reason for Consult: Management of T2DM, Hyperglycemia     Surgical Procedure and Date:       04/21/2019:         1. Exploratory laparotomy        2. Ligation of left ureter        3. Removal of left JJ ureteral stent      Diabetes diagnosis year: ANDRE    Home Diabetes Medications:  ANDRE  Toujeo 50 BID  Invokana 300mg daily   Novolog 35 units AM, 45 units PM, 35 units PM    How often checking glucose at home? ANDRE   BG readings on regimen: ANDRE  Hypoglycemia on the regimen?  ANDRE  Missed doses on regimen?  ANDRE    Diabetes Complications include:     Hyperglycemia and Diabetic retinopathy     Complicating diabetes co morbidities:   History of MI and MURTAZA, CAD, HLD    HPI:   Patient is a 59 y.o. female with a diagnosis of HTN, HLN, DM type 2, nonproliferative diabetic renopathy, CAD, NSTEMI, and back pain who presents to the ED with a complaint of altered mental status on 04/20/2019. Is now s/p Exploratory laparotomy, Ligation of left ureter, and Removal of left JJ ureteral stent. Endocrinology consulted to manage DM2/Hyperglycemia.    Lab Results   Component Value Date    HGBA1C 10.8 (H) 02/19/2019       Interval HPI:   Overnight events: Remains in SICU. NAEON. BG within goal ranges on scheduled Novolog and correction scale coverage.  Eating:   NPO  Nausea: No  Hypoglycemia and intervention: No  Fever: Yes (100.8)   TPN and/or TF: Yes  If yes, type of TF/TPN and rate: Peptamen @ 40 cc/hr    /65 (BP Location: Left arm, Patient Position: Lying)   Pulse 97   Temp (!) 100.8 °F (38.2 °C)   Resp (!) 29   Ht 5' 5" (1.651 m)   Wt 83 kg (182 lb 15.7 oz)   SpO2 98%   Breastfeeding? No   BMI 30.45 kg/m²      Labs Reviewed and Include    Recent Labs   Lab 05/15/19  0300   *   CALCIUM 8.7   ALBUMIN 1.6*   PROT 5.9*      K 4.3   CO2 23   *   BUN 33*   CREATININE 1.1   ALKPHOS 120   ALT 5*   AST 18   BILITOT 0.6 "     Lab Results   Component Value Date    WBC 16.39 (H) 05/15/2019    HGB 8.8 (L) 05/15/2019    HCT 27.0 (L) 05/15/2019    MCV 90 05/15/2019     05/15/2019     No results for input(s): TSH, FREET4 in the last 168 hours.  Lab Results   Component Value Date    HGBA1C 10.8 (H) 02/19/2019       Nutritional status:   Body mass index is 30.45 kg/m².  Lab Results   Component Value Date    ALBUMIN 1.6 (L) 05/15/2019    ALBUMIN 1.6 (L) 05/14/2019    ALBUMIN 1.7 (L) 05/13/2019     No results found for: PREALBUMIN    Estimated Creatinine Clearance: 59.3 mL/min (based on SCr of 1.1 mg/dL).    Accu-Checks  Recent Labs     05/14/19  0011 05/14/19  0336 05/14/19  0735 05/14/19  1048 05/14/19  1644 05/14/19  2013 05/14/19  2359 05/15/19  0331 05/15/19  0733 05/15/19  1219   POCTGLUCOSE 207* 205* 161* 150* 155* 132* 164* 149* 148* 189*       Current Medications and/or Treatments Impacting Glycemic Control  Immunotherapy:    Immunosuppressants     None        Steroids:   Hormones (From admission, onward)    None        Pressors:    Autonomic Drugs (From admission, onward)    None        Hyperglycemia/Diabetes Medications:   Antihyperglycemics (From admission, onward)    Start     Stop Route Frequency Ordered    05/11/19 1200  insulin aspart U-100 pen 3 Units      -- SubQ Every 24 hours (non-standard times) 05/10/19 1351    05/11/19 0800  insulin aspart U-100 pen 3 Units      -- SubQ Every 24 hours (non-standard times) 05/10/19 1351    05/11/19 0400  insulin aspart U-100 pen 3 Units      -- SubQ Every 24 hours (non-standard times) 05/10/19 1351    05/11/19 0000  insulin aspart U-100 pen 3 Units      -- SubQ Every 24 hours (non-standard times) 05/10/19 1351    05/10/19 2000  insulin aspart U-100 pen 3 Units      -- SubQ Every 24 hours (non-standard times) 05/10/19 1351    05/10/19 1600  insulin aspart U-100 pen 3 Units      -- SubQ Every 24 hours (non-standard times) 05/10/19 1351    05/10/19 1450  insulin aspart U-100 pen 0-5  Units      -- SubQ Every 4 hours PRN 05/10/19 1351          ASSESSMENT and PLAN    * Ureteral transection of left native kidney  Managed per primary team  Avoid hypoglycemia        Type 2 diabetes mellitus with diabetic peripheral angiopathy without gangrene  BG goal 140 - 180    Novolog 3 units q 4 hrs (wieght based dosing using 0.5 modifier)  Hold if TFs are stopped.  Low dose correction scale   BG monitoring q 4 hrs.     ** Please notify Endocrine for any change and/or advance in diet/TF**  ** Please call Endocrine for any BG related issues **    Discharge Recommendations:     TBD.             CAD (coronary artery disease)  Managed per primary team  Condition may cause insulin resistance       Sleep apnea  May affect BG readings if uncontrolled        PAPA (acute kidney injury)  Caution with insulin stacking        HLD (hyperlipidemia)  Managed per primary team  Condition may cause insulin resistance         On enteral nutrition  May cause BG excursions.           Jolanta Morales, SANTOSH  Endocrinology  [facility]

## 2019-05-15 NOTE — ASSESSMENT & PLAN NOTE
Ms. Huff is a 58 yo F with PMH of HTN, DM II, CAD, NSTEMI, s/p L5-S1 OLIF with NSGY 4/12 c/b intraoperative left ureteral injury and underwent ureteroureteral anastomoses and ureteral stent placement complicated by sepsis due to E.coli septicemia now s/p ex lap on 4/21 and PEG/Trach of the vent now having BRBPR.     S/p flex sig on 5/13  Cont quickclot/surgicell packing as needed  Transfuse as needed  cont to hold hep gtt  Please call with any questions or concerns

## 2019-05-15 NOTE — PROGRESS NOTES
Dr. Carter at bedside and notified of pt's bright red bleed from rectum. H&H draw and pt's vital signs monitored. Will continue to monitor pt.

## 2019-05-15 NOTE — PLAN OF CARE
Problem: Occupational Therapy Goal  Goal: Occupational Therapy Goal  Goals to be met by: 5/20/19     Patient will increase functional independence with ADLs by performing:    UE Dressing with Set-up Assistance.  LE Dressing with Maximum Assistance and Assistive Devices as needed.  Grooming while standing with Minimal Assistance.  Toileting from bedside commode with Minimal Assistance for hygiene and clothing management.   Sitting at edge of bed x10 minutes with Supervision.  Rolling to Bilateral with Minimal Assistance.   Supine to sit with Minimal Assistance.  Toilet transfer to bedside commode with Minimal Assistance.     Outcome: Ongoing (interventions implemented as appropriate)  Goals remain appropriate     Comments: Continue OT POC     Ana Miller OT  5/15/2019

## 2019-05-15 NOTE — PLAN OF CARE
Problem: Physical Therapy Goal  Goal: Physical Therapy Goal  Goals to be met by: 19    Patient will increase functional independence with mobility by performin. Supine to sit with Moderate Assistance -not met  2. Sit to stand transfer with Minimal Assistance -not met  3. Gait  x 30 feet with Contact Guard Assistance using Rolling Walker. -not met  4. Lower extremity exercise program x15 reps , with assistance as needed-not met     Goals remain appropriate. Cathy Taylor, PT 5/15/2019

## 2019-05-15 NOTE — ASSESSMENT & PLAN NOTE
Elizaamanda Huff is a 59 y.o. female s/p left ureteral injury on 4/12, presented with fever, AMS, and intraabdominal abscess, taken for washout and ligation of left ureter with neph tube placement on 4/21, Trach/PEG 5/2.    - Tube feeds per SICU  - Ancef, Rifampin per ID recs   - BAL on 5/8 showed no organisms   - afebrile overnight, WBC was 14.8  - Maintain neph tube, and MADHURI drain, Fontenot removed  - Urine output adequate, creatinine down to 1.1 today   - back on heparin gtt  - diuresis/fluids per SICU    Dispo: continue ICU care

## 2019-05-16 LAB
ALBUMIN SERPL BCP-MCNC: 1.5 G/DL (ref 3.5–5.2)
ALP SERPL-CCNC: 99 U/L (ref 55–135)
ALT SERPL W/O P-5'-P-CCNC: <5 U/L (ref 10–44)
ANION GAP SERPL CALC-SCNC: 7 MMOL/L (ref 8–16)
AST SERPL-CCNC: 20 U/L (ref 10–40)
BASOPHILS # BLD AUTO: 0.06 K/UL (ref 0–0.2)
BASOPHILS # BLD AUTO: 0.07 K/UL (ref 0–0.2)
BASOPHILS NFR BLD: 0.4 % (ref 0–1.9)
BASOPHILS NFR BLD: 0.4 % (ref 0–1.9)
BASOPHILS NFR BLD: 0.5 % (ref 0–1.9)
BASOPHILS NFR BLD: 0.6 % (ref 0–1.9)
BILIRUB SERPL-MCNC: 0.5 MG/DL (ref 0.1–1)
BLD PROD TYP BPU: NORMAL
BLD PROD TYP BPU: NORMAL
BLOOD UNIT EXPIRATION DATE: NORMAL
BLOOD UNIT EXPIRATION DATE: NORMAL
BLOOD UNIT TYPE CODE: 5100
BLOOD UNIT TYPE CODE: 5100
BLOOD UNIT TYPE: NORMAL
BLOOD UNIT TYPE: NORMAL
BUN SERPL-MCNC: 37 MG/DL (ref 6–20)
CALCIUM SERPL-MCNC: 8.6 MG/DL (ref 8.7–10.5)
CHLORIDE SERPL-SCNC: 112 MMOL/L (ref 95–110)
CO2 SERPL-SCNC: 24 MMOL/L (ref 23–29)
CODING SYSTEM: NORMAL
CODING SYSTEM: NORMAL
CREAT SERPL-MCNC: 1.1 MG/DL (ref 0.5–1.4)
DIFFERENTIAL METHOD: ABNORMAL
DISPENSE STATUS: NORMAL
DISPENSE STATUS: NORMAL
EOSINOPHIL # BLD AUTO: 0.3 K/UL (ref 0–0.5)
EOSINOPHIL # BLD AUTO: 0.5 K/UL (ref 0–0.5)
EOSINOPHIL # BLD AUTO: 0.5 K/UL (ref 0–0.5)
EOSINOPHIL # BLD AUTO: 0.6 K/UL (ref 0–0.5)
EOSINOPHIL NFR BLD: 2 % (ref 0–8)
EOSINOPHIL NFR BLD: 3.4 % (ref 0–8)
EOSINOPHIL NFR BLD: 4 % (ref 0–8)
EOSINOPHIL NFR BLD: 4.1 % (ref 0–8)
ERYTHROCYTE [DISTWIDTH] IN BLOOD BY AUTOMATED COUNT: 14.9 % (ref 11.5–14.5)
ERYTHROCYTE [DISTWIDTH] IN BLOOD BY AUTOMATED COUNT: 15.8 % (ref 11.5–14.5)
ERYTHROCYTE [DISTWIDTH] IN BLOOD BY AUTOMATED COUNT: 15.9 % (ref 11.5–14.5)
ERYTHROCYTE [DISTWIDTH] IN BLOOD BY AUTOMATED COUNT: 15.9 % (ref 11.5–14.5)
EST. GFR  (AFRICAN AMERICAN): >60 ML/MIN/1.73 M^2
EST. GFR  (NON AFRICAN AMERICAN): 55.5 ML/MIN/1.73 M^2
GLUCOSE SERPL-MCNC: 189 MG/DL (ref 70–110)
HCT VFR BLD AUTO: 22.7 % (ref 37–48.5)
HCT VFR BLD AUTO: 25.2 % (ref 37–48.5)
HCT VFR BLD AUTO: 26.1 % (ref 37–48.5)
HCT VFR BLD AUTO: 30.2 % (ref 37–48.5)
HGB BLD-MCNC: 7.2 G/DL (ref 12–16)
HGB BLD-MCNC: 8 G/DL (ref 12–16)
HGB BLD-MCNC: 8.2 G/DL (ref 12–16)
HGB BLD-MCNC: 9.5 G/DL (ref 12–16)
IMM GRANULOCYTES # BLD AUTO: 0.12 K/UL (ref 0–0.04)
IMM GRANULOCYTES # BLD AUTO: 0.13 K/UL (ref 0–0.04)
IMM GRANULOCYTES # BLD AUTO: 0.15 K/UL (ref 0–0.04)
IMM GRANULOCYTES # BLD AUTO: 0.16 K/UL (ref 0–0.04)
IMM GRANULOCYTES NFR BLD AUTO: 0.8 % (ref 0–0.5)
IMM GRANULOCYTES NFR BLD AUTO: 1.1 % (ref 0–0.5)
IMM GRANULOCYTES NFR BLD AUTO: 1.1 % (ref 0–0.5)
IMM GRANULOCYTES NFR BLD AUTO: 1.2 % (ref 0–0.5)
INR PPP: 1.2 (ref 0.8–1.2)
LYMPHOCYTES # BLD AUTO: 1.4 K/UL (ref 1–4.8)
LYMPHOCYTES # BLD AUTO: 1.5 K/UL (ref 1–4.8)
LYMPHOCYTES # BLD AUTO: 1.6 K/UL (ref 1–4.8)
LYMPHOCYTES # BLD AUTO: 2 K/UL (ref 1–4.8)
LYMPHOCYTES NFR BLD: 11.3 % (ref 18–48)
LYMPHOCYTES NFR BLD: 13.8 % (ref 18–48)
LYMPHOCYTES NFR BLD: 14.2 % (ref 18–48)
LYMPHOCYTES NFR BLD: 8 % (ref 18–48)
MAGNESIUM SERPL-MCNC: 1.8 MG/DL (ref 1.6–2.6)
MCH RBC QN AUTO: 28.4 PG (ref 27–31)
MCH RBC QN AUTO: 28.7 PG (ref 27–31)
MCH RBC QN AUTO: 28.8 PG (ref 27–31)
MCH RBC QN AUTO: 29 PG (ref 27–31)
MCHC RBC AUTO-ENTMCNC: 31.4 G/DL (ref 32–36)
MCHC RBC AUTO-ENTMCNC: 31.5 G/DL (ref 32–36)
MCHC RBC AUTO-ENTMCNC: 31.7 G/DL (ref 32–36)
MCHC RBC AUTO-ENTMCNC: 31.7 G/DL (ref 32–36)
MCV RBC AUTO: 90 FL (ref 82–98)
MCV RBC AUTO: 91 FL (ref 82–98)
MCV RBC AUTO: 91 FL (ref 82–98)
MCV RBC AUTO: 92 FL (ref 82–98)
MONOCYTES # BLD AUTO: 0.9 K/UL (ref 0.3–1)
MONOCYTES # BLD AUTO: 0.9 K/UL (ref 0.3–1)
MONOCYTES # BLD AUTO: 1 K/UL (ref 0.3–1)
MONOCYTES # BLD AUTO: 1.2 K/UL (ref 0.3–1)
MONOCYTES NFR BLD: 5.2 % (ref 4–15)
MONOCYTES NFR BLD: 7.1 % (ref 4–15)
MONOCYTES NFR BLD: 8.4 % (ref 4–15)
MONOCYTES NFR BLD: 8.8 % (ref 4–15)
NEUTROPHILS # BLD AUTO: 10.5 K/UL (ref 1.8–7.7)
NEUTROPHILS # BLD AUTO: 14.2 K/UL (ref 1.8–7.7)
NEUTROPHILS # BLD AUTO: 8 K/UL (ref 1.8–7.7)
NEUTROPHILS # BLD AUTO: 9.9 K/UL (ref 1.8–7.7)
NEUTROPHILS NFR BLD: 71.4 % (ref 38–73)
NEUTROPHILS NFR BLD: 72 % (ref 38–73)
NEUTROPHILS NFR BLD: 76.6 % (ref 38–73)
NEUTROPHILS NFR BLD: 83.6 % (ref 38–73)
NRBC BLD-RTO: 0 /100 WBC
PHOSPHATE SERPL-MCNC: 2.8 MG/DL (ref 2.7–4.5)
PLATELET # BLD AUTO: 190 K/UL (ref 150–350)
PLATELET # BLD AUTO: 199 K/UL (ref 150–350)
PLATELET # BLD AUTO: 242 K/UL (ref 150–350)
PLATELET # BLD AUTO: 247 K/UL (ref 150–350)
PMV BLD AUTO: 10.4 FL (ref 9.2–12.9)
PMV BLD AUTO: 10.7 FL (ref 9.2–12.9)
PMV BLD AUTO: 10.8 FL (ref 9.2–12.9)
PMV BLD AUTO: 10.8 FL (ref 9.2–12.9)
POCT GLUCOSE: 173 MG/DL (ref 70–110)
POCT GLUCOSE: 180 MG/DL (ref 70–110)
POCT GLUCOSE: 186 MG/DL (ref 70–110)
POCT GLUCOSE: 201 MG/DL (ref 70–110)
POCT GLUCOSE: 208 MG/DL (ref 70–110)
POCT GLUCOSE: 213 MG/DL (ref 70–110)
POTASSIUM SERPL-SCNC: 4.3 MMOL/L (ref 3.5–5.1)
PROT SERPL-MCNC: 5.5 G/DL (ref 6–8.4)
PROTHROMBIN TIME: 11.7 SEC (ref 9–12.5)
RBC # BLD AUTO: 2.48 M/UL (ref 4–5.4)
RBC # BLD AUTO: 2.78 M/UL (ref 4–5.4)
RBC # BLD AUTO: 2.86 M/UL (ref 4–5.4)
RBC # BLD AUTO: 3.34 M/UL (ref 4–5.4)
SODIUM SERPL-SCNC: 143 MMOL/L (ref 136–145)
TRANS ERYTHROCYTES VOL PATIENT: NORMAL ML
TRANS ERYTHROCYTES VOL PATIENT: NORMAL ML
WBC # BLD AUTO: 11.07 K/UL (ref 3.9–12.7)
WBC # BLD AUTO: 13.7 K/UL (ref 3.9–12.7)
WBC # BLD AUTO: 13.91 K/UL (ref 3.9–12.7)
WBC # BLD AUTO: 16.94 K/UL (ref 3.9–12.7)

## 2019-05-16 PROCEDURE — 63600175 PHARM REV CODE 636 W HCPCS: Performed by: STUDENT IN AN ORGANIZED HEALTH CARE EDUCATION/TRAINING PROGRAM

## 2019-05-16 PROCEDURE — 27100171 HC OXYGEN HIGH FLOW UP TO 24 HOURS

## 2019-05-16 PROCEDURE — 86920 COMPATIBILITY TEST SPIN: CPT

## 2019-05-16 PROCEDURE — P9021 RED BLOOD CELLS UNIT: HCPCS

## 2019-05-16 PROCEDURE — 25000003 PHARM REV CODE 250: Performed by: STUDENT IN AN ORGANIZED HEALTH CARE EDUCATION/TRAINING PROGRAM

## 2019-05-16 PROCEDURE — 99900035 HC TECH TIME PER 15 MIN (STAT)

## 2019-05-16 PROCEDURE — 99232 SBSQ HOSP IP/OBS MODERATE 35: CPT | Mod: ,,, | Performed by: NURSE PRACTITIONER

## 2019-05-16 PROCEDURE — 27000221 HC OXYGEN, UP TO 24 HOURS

## 2019-05-16 PROCEDURE — 94761 N-INVAS EAR/PLS OXIMETRY MLT: CPT

## 2019-05-16 PROCEDURE — 80053 COMPREHEN METABOLIC PANEL: CPT

## 2019-05-16 PROCEDURE — 86850 RBC ANTIBODY SCREEN: CPT

## 2019-05-16 PROCEDURE — 85025 COMPLETE CBC W/AUTO DIFF WBC: CPT | Mod: 91

## 2019-05-16 PROCEDURE — 85610 PROTHROMBIN TIME: CPT

## 2019-05-16 PROCEDURE — 99233 PR SUBSEQUENT HOSPITAL CARE,LEVL III: ICD-10-PCS | Mod: ,,, | Performed by: SURGERY

## 2019-05-16 PROCEDURE — 99900026 HC AIRWAY MAINTENANCE (STAT)

## 2019-05-16 PROCEDURE — 99232 PR SUBSEQUENT HOSPITAL CARE,LEVL II: ICD-10-PCS | Mod: ,,, | Performed by: NURSE PRACTITIONER

## 2019-05-16 PROCEDURE — 94003 VENT MGMT INPAT SUBQ DAY: CPT

## 2019-05-16 PROCEDURE — 99233 SBSQ HOSP IP/OBS HIGH 50: CPT | Mod: ,,, | Performed by: SURGERY

## 2019-05-16 PROCEDURE — 20000000 HC ICU ROOM

## 2019-05-16 PROCEDURE — 83735 ASSAY OF MAGNESIUM: CPT

## 2019-05-16 PROCEDURE — C9113 INJ PANTOPRAZOLE SODIUM, VIA: HCPCS | Performed by: STUDENT IN AN ORGANIZED HEALTH CARE EDUCATION/TRAINING PROGRAM

## 2019-05-16 PROCEDURE — 84100 ASSAY OF PHOSPHORUS: CPT

## 2019-05-16 RX ORDER — HYDROCODONE BITARTRATE AND ACETAMINOPHEN 500; 5 MG/1; MG/1
TABLET ORAL
Status: DISCONTINUED | OUTPATIENT
Start: 2019-05-16 | End: 2019-05-17

## 2019-05-16 RX ORDER — LOPERAMIDE HCL 1MG/7.5ML
2 LIQUID (ML) ORAL 4 TIMES DAILY
Status: DISCONTINUED | OUTPATIENT
Start: 2019-05-16 | End: 2019-05-29

## 2019-05-16 RX ORDER — FLUCONAZOLE 40 MG/ML
200 POWDER, FOR SUSPENSION ORAL DAILY
Status: DISCONTINUED | OUTPATIENT
Start: 2019-05-16 | End: 2019-05-22

## 2019-05-16 RX ADMIN — SILODOSIN 4 MG: 4 CAPSULE ORAL at 08:05

## 2019-05-16 RX ADMIN — CEFAZOLIN 2 G: 1 INJECTION, POWDER, FOR SOLUTION INTRAMUSCULAR; INTRAVENOUS at 01:05

## 2019-05-16 RX ADMIN — RIFAMPIN 300 MG: 600 INJECTION, POWDER, LYOPHILIZED, FOR SOLUTION INTRAVENOUS at 03:05

## 2019-05-16 RX ADMIN — INSULIN ASPART 3 UNITS: 100 INJECTION, SOLUTION INTRAVENOUS; SUBCUTANEOUS at 12:05

## 2019-05-16 RX ADMIN — MICONAZOLE NITRATE: 20 OINTMENT TOPICAL at 08:05

## 2019-05-16 RX ADMIN — CHLORHEXIDINE GLUCONATE 0.12% ORAL RINSE 15 ML: 1.2 LIQUID ORAL at 08:05

## 2019-05-16 RX ADMIN — CARVEDILOL 3.12 MG: 3.12 TABLET, FILM COATED ORAL at 08:05

## 2019-05-16 RX ADMIN — HYDROMORPHONE HYDROCHLORIDE 1 MG: 1 INJECTION, SOLUTION INTRAMUSCULAR; INTRAVENOUS; SUBCUTANEOUS at 06:05

## 2019-05-16 RX ADMIN — Medication 2 MG: at 08:05

## 2019-05-16 RX ADMIN — PANTOPRAZOLE SODIUM 40 MG: 40 INJECTION, POWDER, FOR SOLUTION INTRAVENOUS at 08:05

## 2019-05-16 RX ADMIN — CEFAZOLIN 2 G: 1 INJECTION, POWDER, FOR SOLUTION INTRAMUSCULAR; INTRAVENOUS at 06:05

## 2019-05-16 RX ADMIN — HYDROMORPHONE HYDROCHLORIDE 1 MG: 1 INJECTION, SOLUTION INTRAMUSCULAR; INTRAVENOUS; SUBCUTANEOUS at 02:05

## 2019-05-16 RX ADMIN — DIBASIC SODIUM PHOSPHATE, MONOBASIC POTASSIUM PHOSPHATE AND MONOBASIC SODIUM PHOSPHATE 1 TABLET: 852; 155; 130 TABLET ORAL at 08:05

## 2019-05-16 RX ADMIN — Medication 2 MG: at 12:05

## 2019-05-16 RX ADMIN — HYDROMORPHONE HYDROCHLORIDE 1 MG: 1 INJECTION, SOLUTION INTRAMUSCULAR; INTRAVENOUS; SUBCUTANEOUS at 12:05

## 2019-05-16 RX ADMIN — INSULIN ASPART 3 UNITS: 100 INJECTION, SOLUTION INTRAVENOUS; SUBCUTANEOUS at 03:05

## 2019-05-16 RX ADMIN — INSULIN ASPART 3 UNITS: 100 INJECTION, SOLUTION INTRAVENOUS; SUBCUTANEOUS at 07:05

## 2019-05-16 RX ADMIN — INSULIN ASPART 1 UNITS: 100 INJECTION, SOLUTION INTRAVENOUS; SUBCUTANEOUS at 03:05

## 2019-05-16 RX ADMIN — RIFAMPIN 300 MG: 600 INJECTION, POWDER, LYOPHILIZED, FOR SOLUTION INTRAVENOUS at 02:05

## 2019-05-16 RX ADMIN — INSULIN ASPART 2 UNITS: 100 INJECTION, SOLUTION INTRAVENOUS; SUBCUTANEOUS at 07:05

## 2019-05-16 RX ADMIN — CEFAZOLIN 2 G: 1 INJECTION, POWDER, FOR SOLUTION INTRAMUSCULAR; INTRAVENOUS at 10:05

## 2019-05-16 RX ADMIN — HYDROMORPHONE HYDROCHLORIDE 1 MG: 1 INJECTION, SOLUTION INTRAMUSCULAR; INTRAVENOUS; SUBCUTANEOUS at 08:05

## 2019-05-16 RX ADMIN — MAGNESIUM SULFATE IN WATER 2 G: 40 INJECTION, SOLUTION INTRAVENOUS at 07:05

## 2019-05-16 RX ADMIN — FLUCONAZOLE 200 MG: 40 POWDER, FOR SUSPENSION ORAL at 08:05

## 2019-05-16 RX ADMIN — HYDRALAZINE HYDROCHLORIDE 10 MG: 20 INJECTION INTRAMUSCULAR; INTRAVENOUS at 07:05

## 2019-05-16 NOTE — PT/OT/SLP PROGRESS
Physical Therapy      Patient Name:  Mariann Huff   MRN:  [id number]    Patient not seen today secondary to (pt on hold 2nd to rectal bleeding with movement of lower extremities. ). Will follow-up at a later date..    Cathy Taylor, PT   5/16/2019'

## 2019-05-16 NOTE — PLAN OF CARE
Problem: Adult Inpatient Plan of Care  Goal: Plan of Care Review  Outcome: Ongoing (interventions implemented as appropriate)  VSS and a febrile. Gtts: MIV at 5. Tube feeding continues at 40 ml/hr with no residual or complications. Pt currently on trach collar at 40% FiO2. Pt is AAO, will move all extremities and follow all commands. Pt had dark red rectal bleeds every other hour during the shift. No vomiting or nausea. Orders received to transfuse 2 units of PRBCs. Plan of care reviewed with the patient and family and all questions and concerns addressed. Will continue to monitor pt.

## 2019-05-16 NOTE — ASSESSMENT & PLAN NOTE
Ms. Huff is a 60 yo F with PMH of HTN, DM II, CAD, NSTEMI, s/p L5-S1 OLIF with NSGY 4/12 c/b intraoperative left ureteral injury and underwent ureteroureteral anastomoses and ureteral stent placement complicated by sepsis due to E.coli septicemia now s/p ex lap on 4/21 and PEG/Trach of the vent now having BRBPR.     S/p flex sig on 5/13  Placed hemablast in rectum yesterday  Still having oozing but HDS and H/H stable, cont to trend  Transfuse as needed  cont to hold hep gtt  Please call with any questions or concerns

## 2019-05-16 NOTE — ASSESSMENT & PLAN NOTE
Elizaamanda Huff is a 59 y.o. female s/p left ureteral injury on 4/12, presented with fever, AMS, and intraabdominal abscess, taken for washout and ligation of left ureter with neph tube placement on 4/21, Trach/PEG 5/2.    - Tube feeds per SICU  - Ancef, Rifampin per ID recs   - 5/13 UCx growing Candida albicans; will add Diflucan  - Maintain neph tube, and MADHURI drain, Fontenot removed yesterday; will get bladder scan  - Urine output low, Cr stable at 1.1  - diuresis/fluids per SICU    Dispo: continue ICU care; plan for transfer to LTAC in future

## 2019-05-16 NOTE — SUBJECTIVE & OBJECTIVE
Interval History: Overnight patient febrile to 102.6, currently at 100.3. On vent, HDS without pressors. L nephrostomy output 625 cc. Fontenot output yesterday morning 145 cc, since removed. Patient denies sensation of bladder fullness. Cr stable at 1.1.  Drain output 10 cc. WBC downtrending on Ancef and rifampin, 5/13 UCx grew Candida albicans.    Review of Systems  Objective:     Temp:  [99.6 °F (37.6 °C)-102.6 °F (39.2 °C)] 100.3 °F (37.9 °C)  Pulse:  [] 101  Resp:  [2-32] 26  SpO2:  [95 %-100 %] 98 %  BP: (111-193)/(59-99) 133/68     Body mass index is 30.45 kg/m².      Bladder Scan Volume (mL): 27 mL (05/10/19 0846)  Post Void Cath Residual Output (mL): 27 mL (05/10/19 0846)    Drains     Drain                 Closed/Suction Drain 04/21/19 0300 LLQ 25 days         Nephrostomy 04/21/19 0629 Left 8 Fr. 25 days         Gastrostomy/Enterostomy 05/02/19 1134 Percutaneous endoscopic gastrostomy (PEG) LUQ decompression;feeding 13 days                Physical Exam    Significant Labs:    BMP:  Recent Labs   Lab 05/14/19  0300 05/15/19  0300 05/16/19  0328    144 143   K 4.4 4.3 4.3   * 112* 112*   CO2 24 23 24   BUN 41* 33* 37*   CREATININE 1.3 1.1 1.1   CALCIUM 8.4* 8.7 8.6*       CBC:   Recent Labs   Lab 05/15/19  1326 05/16/19  0009 05/16/19  0328   WBC 16.39* 13.70* 11.07   HGB 8.8* 8.0* 9.5*   HCT 27.0* 25.2* 30.2*    247 199       Significant Imaging:  All pertinent imaging results/findings from the past 24 hours have been reviewed.

## 2019-05-16 NOTE — SUBJECTIVE & OBJECTIVE
Subjective:     Interval History: placed hemablast in rectum yesterday, still having liquid stool/blood, HDS, and H/H stable    Post-Op Info:  Procedure(s) (LRB):  SIGMOIDOSCOPY, FLEXIBLE (N/A)   3 Days Post-Op      Medications:  Continuous Infusions:  Scheduled Meds:   carvedilol  3.125 mg Per G Tube BID    ceFAZolin (ANCEF) IVPB  2 g Intravenous Q8H    chlorhexidine  15 mL Mouth/Throat BID    fluconazole 40 mg/ml  200 mg Per G Tube Daily    insulin aspart U-100  3 Units Subcutaneous Q24H    insulin aspart U-100  3 Units Subcutaneous Q24H    insulin aspart U-100  3 Units Subcutaneous Q24H    insulin aspart U-100  3 Units Subcutaneous Q24H    insulin aspart U-100  3 Units Subcutaneous Q24H    insulin aspart U-100  3 Units Subcutaneous Q24H    k phos di & mono-sod phos mono  1 tablet Per G Tube BID    loperamide  2 mg Per G Tube QID    miconazole nitrate 2%   Topical (Top) BID    pantoprazole  40 mg Intravenous Daily    rifAMpin (RIFADIN) IVPB  300 mg Intravenous Q12H    silodosin  4 mg Per G Tube Daily     PRN Meds:   sodium chloride    acetaminophen    albuterol-ipratropium    dextrose 50%    glucagon (human recombinant)    hydrALAZINE    HYDROmorphone    hydrOXYzine    insulin aspart U-100    labetalol    magnesium sulfate IVPB    magnesium sulfate IVPB    potassium chloride in water    promethazine (PHENERGAN) IVPB    sodium chloride 0.9%        Objective:     Vital Signs (Most Recent):  Temp: 99.8 °F (37.7 °C) (05/16/19 0700)  Pulse: 88 (05/16/19 1000)  Resp: (!) 26 (05/16/19 1000)  BP: (!) 141/75 (05/16/19 1000)  SpO2: 100 % (05/16/19 1000) Vital Signs (24h Range):  Temp:  [99.8 °F (37.7 °C)-102.6 °F (39.2 °C)] 99.8 °F (37.7 °C)  Pulse:  [] 88  Resp:  [2-32] 26  SpO2:  [95 %-100 %] 100 %  BP: (111-180)/(59-84) 141/75     Intake/Output - Last 3 Shifts       05/14 0700 - 05/15 0659 05/15 0700 - 05/16 0659 05/16 0700 - 05/17 0659    I.V. (mL/kg) 308.5 (3.7) 313 (3.8)      Blood 350      NG/GT 1000 1000 120    Total Intake(mL/kg) 1658.5 (20) 1313 (15.8) 120 (1.4)    Urine (mL/kg/hr) 1505 (0.8) 890 (0.4) 125 (0.4)    Drains 5 20     Stool 0 0     Total Output 1510 910 125    Net +148.5 +403 -5           Urine Occurrence 1 x      Stool Occurrence 4 x 4 x           Physical Exam   Constitutional: She is oriented to person, place, and time. No distress.   Neck: Neck supple.   Cardiovascular: Normal rate and regular rhythm.   Pulmonary/Chest: Effort normal. No respiratory distress.   Abdominal: Soft. She exhibits no distension. There is no tenderness.   Musculoskeletal: Normal range of motion.   Neurological: She is alert and oriented to person, place, and time.   Skin: Skin is warm and dry.       Anorectal Exam:      Anterior anal ulcer from BMS    Significant Labs:  BMP (Last 3 Results):   Recent Labs   Lab 05/14/19  0300 05/15/19  0300 05/16/19  0328   * 135* 189*    144 143   K 4.4 4.3 4.3   * 112* 112*   CO2 24 23 24   BUN 41* 33* 37*   CREATININE 1.3 1.1 1.1   CALCIUM 8.4* 8.7 8.6*   MG 2.0 1.9 1.8     CBC (Last 3 Results):   Recent Labs   Lab 05/15/19  1326 05/16/19  0009 05/16/19  0328   WBC 16.39* 13.70* 11.07   RBC 3.01* 2.78* 3.34*   HGB 8.8* 8.0* 9.5*   HCT 27.0* 25.2* 30.2*    247 199   MCV 90 91 90   MCH 29.2 28.8 28.4   MCHC 32.6 31.7* 31.5*       Significant Diagnostics:  None

## 2019-05-16 NOTE — PROGRESS NOTES
[facility]  Colorectal Surgery  Progress Note    Patient Name: Mariann Huff  MRN: [id number]  Admission Date: 4/20/2019  Hospital Length of Stay: 26 days  Attending Physician: Robin Boyd MD    Subjective:     Interval History: placed hemablast in rectum yesterday, still having liquid stool/blood, HDS, and H/H stable    Post-Op Info:  Procedure(s) (LRB):  SIGMOIDOSCOPY, FLEXIBLE (N/A)   3 Days Post-Op      Medications:  Continuous Infusions:  Scheduled Meds:   carvedilol  3.125 mg Per G Tube BID    ceFAZolin (ANCEF) IVPB  2 g Intravenous Q8H    chlorhexidine  15 mL Mouth/Throat BID    fluconazole 40 mg/ml  200 mg Per G Tube Daily    insulin aspart U-100  3 Units Subcutaneous Q24H    insulin aspart U-100  3 Units Subcutaneous Q24H    insulin aspart U-100  3 Units Subcutaneous Q24H    insulin aspart U-100  3 Units Subcutaneous Q24H    insulin aspart U-100  3 Units Subcutaneous Q24H    insulin aspart U-100  3 Units Subcutaneous Q24H    k phos di & mono-sod phos mono  1 tablet Per G Tube BID    loperamide  2 mg Per G Tube QID    miconazole nitrate 2%   Topical (Top) BID    pantoprazole  40 mg Intravenous Daily    rifAMpin (RIFADIN) IVPB  300 mg Intravenous Q12H    silodosin  4 mg Per G Tube Daily     PRN Meds:   sodium chloride    acetaminophen    albuterol-ipratropium    dextrose 50%    glucagon (human recombinant)    hydrALAZINE    HYDROmorphone    hydrOXYzine    insulin aspart U-100    labetalol    magnesium sulfate IVPB    magnesium sulfate IVPB    potassium chloride in water    promethazine (PHENERGAN) IVPB    sodium chloride 0.9%        Objective:     Vital Signs (Most Recent):  Temp: 99.8 °F (37.7 °C) (05/16/19 0700)  Pulse: 88 (05/16/19 1000)  Resp: (!) 26 (05/16/19 1000)  BP: (!) 141/75 (05/16/19 1000)  SpO2: 100 % (05/16/19 1000) Vital Signs (24h Range):  Temp:  [99.8 °F (37.7 °C)-102.6 °F (39.2 °C)] 99.8 °F (37.7 °C)  Pulse:  [] 88  Resp:  [2-32]  26  SpO2:  [95 %-100 %] 100 %  BP: (111-180)/(59-84) 141/75     Intake/Output - Last 3 Shifts       05/14 0700 - 05/15 0659 05/15 0700 - 05/16 0659 05/16 0700 - 05/17 0659    I.V. (mL/kg) 308.5 (3.7) 313 (3.8)     Blood 350      NG/GT 1000 1000 120    Total Intake(mL/kg) 1658.5 (20) 1313 (15.8) 120 (1.4)    Urine (mL/kg/hr) 1505 (0.8) 890 (0.4) 125 (0.4)    Drains 5 20     Stool 0 0     Total Output 1510 910 125    Net +148.5 +403 -5           Urine Occurrence 1 x      Stool Occurrence 4 x 4 x           Physical Exam   Constitutional: She is oriented to person, place, and time. No distress.   Neck: Neck supple.   Cardiovascular: Normal rate and regular rhythm.   Pulmonary/Chest: Effort normal. No respiratory distress.   Abdominal: Soft. She exhibits no distension. There is no tenderness.   Musculoskeletal: Normal range of motion.   Neurological: She is alert and oriented to person, place, and time.   Skin: Skin is warm and dry.       Anorectal Exam:      Anterior anal ulcer from BMS    Significant Labs:  BMP (Last 3 Results):   Recent Labs   Lab 05/14/19  0300 05/15/19  0300 05/16/19  0328   * 135* 189*    144 143   K 4.4 4.3 4.3   * 112* 112*   CO2 24 23 24   BUN 41* 33* 37*   CREATININE 1.3 1.1 1.1   CALCIUM 8.4* 8.7 8.6*   MG 2.0 1.9 1.8     CBC (Last 3 Results):   Recent Labs   Lab 05/15/19  1326 05/16/19  0009 05/16/19  0328   WBC 16.39* 13.70* 11.07   RBC 3.01* 2.78* 3.34*   HGB 8.8* 8.0* 9.5*   HCT 27.0* 25.2* 30.2*    247 199   MCV 90 91 90   MCH 29.2 28.8 28.4   MCHC 32.6 31.7* 31.5*       Significant Diagnostics:  None    Assessment/Plan:     BRBPR (bright red blood per rectum)  Ms. Huff is a 60 yo F with PMH of HTN, DM II, CAD, NSTEMI, s/p L5-S1 OLIF with NSGY 4/12 c/b intraoperative left ureteral injury and underwent ureteroureteral anastomoses and ureteral stent placement complicated by sepsis due to E.coli septicemia now s/p ex lap on 4/21 and PEG/Trach of the vent now having  BRBPR.     S/p flex sig on 5/13  Placed hemablast in rectum yesterday  Still having oozing but HDS and H/H stable, cont to trend  Transfuse as needed  cont to hold hep gtt  Please call with any questions or concerns               John Barker MD  Colorectal Surgery  [facility]

## 2019-05-16 NOTE — ASSESSMENT & PLAN NOTE
ASSESSMENT/PLAN:   Mariann Huff is a 59 y.o. female s/p left ureteral injury on 4/12, who presented with fever, AMS, and intraabdominal abscess, taken for washout and ligation of left ureter with nephrostomy tube placement on 4/21. S/p Trach/PEG on 5/2. Episode of negative pressure pulmonary edema on 5/8 requiring mechanical ventilation, diuresis and low dose pressor. Has been plagued by intermittent BRBPR.     Neuro:   - off sedation  - responds to questions appropriately by nodding  - no focal deficit     Pulmonary:   PAV+ and tolerating well  Vent Mode: Spont  Oxygen Concentration (%):  [39-98] 40  Resp Rate Total:  [16 br/min-35 br/min] 26 br/min  PEEP/CPAP:  [5 cmH20] 5 cmH20  Mean Airway Pressure:  [6.7 cmH20-7.8 cmH20] 7.4 cmH20      - CXR improved  - ABGs prn  - Continue weaning vent as able     Cardiac:   - HDS at this time.  - TTE 5/8 with no evidence of right heart strain or significant valvular pathology, normal LV systolic function, EF 70%     Renal:    - S/p transection of left ureter 4/12 and subsequent ligation and PCT nephrostomy tube placement with IR 4/21  -Fontenot removed 5/16, but has not voided yet  - bladder scans q6hrs  - left kidney is making adequate urine  - Monitor I/Os       Intake/Output Summary (Last 24 hours) at 5/16/2019 0750  Last data filed at 5/16/2019 0700  Gross per 24 hour   Intake 1283 ml   Output 905 ml   Net 378 ml        ID:   - febrile yesterday and borderline this morning  - Blood 5/10 NGTD  - Ucx 5/13 candiduria  - BAL 5/18 negative  - C diff 5/5 negative  - WBC trending down     Hem/Onc:   - H/H stable  - check Hgb prn for bleeding     Endocrine:  - DM Type 2  - TF @ goal  - LDSSI  - Endocrine following for assistance with blood glucose control.      Fluids/Electrolytes/Nutrition/GI:   - Free water flushes 300 cc q6h  - Replace electrolytes PRN  - MADHURI drain removed     # rectal ulcers  - continued intermittent hematochezia  - anoscopy and hemostatic agent applied by  CRS 5/15     PPx:  - DVT: Lovenox, Hep gtt held for continued bloody BMs        DISPO:  - Continue SICU care  - Preparing for LTACH once bloody BMs are managed

## 2019-05-16 NOTE — PROGRESS NOTES
[facility]  Critical Care - Surgery  Progress Note    Patient Name: Mariann Huff  MRN: [id number]  Admission Date: 4/20/2019  Hospital Length of Stay: 26 days  Code Status: Full Code  Attending Provider: Robin Boyd MD  Primary Care Provider: Jasbir Haney MD   Principal Problem: Ureteral transection of left native kidney    Subjective:     Hospital/ICU Course:  The patient has had 2 episodes of bradycardia during the day.  Early in the morning, the patient had difficulty breathing and a mucus plug was found in the inner cannula of the tracheostomy.  She developed bradycardia but never arrested.  She also developed hypotension.  This was followed subsequently after she was bagged (Ambu) with tachypnea and hypertension and tachycardia.  Oxygen saturations were in the low 90s, and the arterial blood gas at that time revealed a significant alveolar arterial gradient with adequate ventilation.  Chest x-ray, which I personally reviewed, revealed what appeared to be a significant fluid overload.  We did perform an echocardiogram urgently, which I personally was present and reviewed the results.  There is no evidence of heart failure.  EKG was reported as normal and there is no evidence that the patient had an acute myocardial event.  The chest x-ray did suggest a significant amount of pulmonary edema. There was also a concern, because the the history of upper body deep venous thrombosis, that this could be a pulmonary embolism.  A CT scan of the chest , which I have personally reviewed show bilateral infiltrates compatible with pulmonary edema of non cardiogenic origin.  It was not possible to rule out a pulmonary embolism.  The patient is anticoagulated currently.  Ultrasound of the lower extremities was done and they do not show any deep venous thrombosis.    The patient did receive some fluids, because of her history of acute kidney injury, which has resolved and because she was receiving IV  contrast.  She has maintained a good urinary output during the day.  She has mostly being hemodynamically stable and her tachycardia resolved.  She remains sedated and on the ventilator.  Follow-up chest x-ray, which I personally reviewed, showed resolving pulmonary edema. Her oxygenation and oxygen saturations improved and we were able to wean the patient FiO2 down to 50%.    Of note, I did perform a bronchoscopy, which failed to reveal any amount of pus or mucus plugging.  There was also no evidence of aspiration.  BAL was performed and was sent.    A 2nd episode of bradycardia was observed in the afternoon.  This responded to the use of atropine and 0.1 mg of epinephrine.  She did have a tachycardic response with this which subsided rapidly.  She remained otherwise hemodynamically stable.  Arterial blood gases, reveal now a PO2 of 300 with adequate pCO2 and pH.  Mild hyperventilation.  She remains comfortable.  I have asked Cardiology to re-evaluate this patient.  For now she remains off pressors.    Neurologically the patient has been intact. She is awake alert and oriented with a nonfocal exam.    Her abdomen remains soft.  She has been NPO for now.  She has not had any further episodes of lower GI bleed.    The patient is having a good urinary output BUN and creatinine have been grossly within normal limits with a slight elevation of BUN.  Electrolytes are being followed and replaced as necessary.    Currently no evidence of infection in this patient.  Empirically I started antibiotics, but will stop them in the next 24-48 hours.    I have had the chance to talk to the patient's  at length and in detail.  I have explained our findings, I will keep him up-to-date as to any progress in understanding the etiology of these episodes and will wait continuing to talk to him as often as necessary.      Interval History/Significant Events: Continued intermittent BRBPR. Hgb relatively stable. No indication for  transfusion. Surgicel inserted in the AM and then hemostatic agent applied via anoscopy in the afternoon by CRS. She does have normal BM without blood at times.    She was febrile yesterday, but her WBC is normal this morning.    Fontenot removed yesterday, but has not voided. <150 seen on bladder scan overnight. She did have candiduria on the urine cx.    MADHURI drain removed today.    Follow-up For: Procedure(s) (LRB):  SIGMOIDOSCOPY, FLEXIBLE (N/A)    Post-Operative Day: 2 Days Post-Op    Objective:     Vital Signs (Most Recent):  Temp: 99.8 °F (37.7 °C) (05/16/19 0700)  Pulse: 93 (05/16/19 0730)  Resp: (!) 26 (05/16/19 0730)  BP: 122/61 (05/16/19 0730)  SpO2: 99 % (05/16/19 0730) Vital Signs (24h Range):  Temp:  [99.8 °F (37.7 °C)-102.6 °F (39.2 °C)] 99.8 °F (37.7 °C)  Pulse:  [] 93  Resp:  [2-32] 26  SpO2:  [95 %-100 %] 99 %  BP: (111-193)/(59-99) 122/61     Weight: 83 kg (182 lb 15.7 oz)  Body mass index is 30.45 kg/m².      Intake/Output Summary (Last 24 hours) at 5/15/2019 1118  Last data filed at 5/15/2019 0800  Gross per 24 hour   Intake 1388.5 ml   Output 1245 ml   Net 143.5 ml       Physical Exam   Constitutional: She appears well-developed and well-nourished.   HENT:   Head: Normocephalic and atraumatic.   Tracheostomy in place   Eyes: Right eye exhibits no discharge. Left eye exhibits no discharge.   Neck: Normal range of motion. Neck supple.   Cardiovascular: Normal rate and regular rhythm.   Pulmonary/Chest: Effort normal. No respiratory distress.   Abdominal: Soft.   Midline incision c/d/i.  PEG with no leak. Abdomen soft, non-distended.  Bowel sounds present   Genitourinary:   Genitourinary Comments: Nephrostomy tube in place with thin dark yellow output.  Fontenot removed  She has BRBPR   Musculoskeletal: Normal range of motion.   Neurological: She is alert.   Able to follow commands, denying pain.    Skin: Skin is warm and dry.   Psychiatric: She has a normal mood and affect.   Nursing note and  vitals reviewed.      Vents:  PAV+ and tolerating well    Lines/Drains/Airways     Central Venous Catheter Line                 Percutaneous Central Line Insertion/Assessment - triple lumen  05/12/19 1256  3 days          Drain                 Closed/Suction Drain 04/21/19 0300 LLQ 25 days         Nephrostomy 04/21/19 0629 Left 8 Fr. 25 days         Gastrostomy/Enterostomy 05/02/19 1134 Percutaneous endoscopic gastrostomy (PEG) LUQ decompression;feeding 13 days          Airway                 Surgical Airway 05/02/19 1107 Shiley Cuffed 13 days          Peripheral Intravenous Line                 Midline Catheter Insertion/Assessment  - Single Lumen 05/07/19 1632 Left basilic vein (medial side of arm) 18g x 8cm 8 days                Significant Labs:    CBC/Anemia Profile:  Recent Labs   Lab 05/15/19  1326 05/16/19  0009 05/16/19  0328   WBC 16.39* 13.70* 11.07   HGB 8.8* 8.0* 9.5*   HCT 27.0* 25.2* 30.2*    247 199   MCV 90 91 90   RDW 15.9* 15.8* 15.9*        Chemistries:  Recent Labs   Lab 05/15/19  0300 05/16/19  0328    143   K 4.3 4.3   * 112*   CO2 23 24   BUN 33* 37*   CREATININE 1.1 1.1   CALCIUM 8.7 8.6*   ALBUMIN 1.6* 1.5*   PROT 5.9* 5.5*   BILITOT 0.6 0.5   ALKPHOS 120 99   ALT 5* <5*   AST 18 20   MG 1.9 1.8   PHOS 3.1 2.8       Blood Culture: No results for input(s): LABBLOO in the last 48 hours.  Urine Culture: No results for input(s): LABURIN in the last 48 hours.  Urine Studies: No results for input(s): COLORU, APPEARANCEUA, PHUR, SPECGRAV, PROTEINUA, GLUCUA, KETONESU, BILIRUBINUA, OCCULTUA, NITRITE, UROBILINOGEN, LEUKOCYTESUR, RBCUA, WBCUA, BACTERIA, SQUAMEPITHEL, HYALINECASTS in the last 48 hours.    Invalid input(s): WRIGHTSUR  All pertinent labs within the past 24 hours have been reviewed.    Significant Imaging:  I have reviewed all pertinent imaging results/findings within the past 24 hours.    Assessment/Plan:     * Ureteral transection of left native  kidney  ASSESSMENT/PLAN:   Mariann Huff is a 59 y.o. female s/p left ureteral injury on 4/12, who presented with fever, AMS, and intraabdominal abscess, taken for washout and ligation of left ureter with nephrostomy tube placement on 4/21. S/p Trach/PEG on 5/2. Episode of negative pressure pulmonary edema on 5/8 requiring mechanical ventilation, diuresis and low dose pressor. Has been plagued by intermittent BRBPR.     Neuro:   - off sedation  - responds to questions appropriately by nodding  - no focal deficit     Pulmonary:   PAV+ and tolerating well  Vent Mode: Spont  Oxygen Concentration (%):  [39-98] 40  Resp Rate Total:  [16 br/min-35 br/min] 26 br/min  PEEP/CPAP:  [5 cmH20] 5 cmH20  Mean Airway Pressure:  [6.7 cmH20-7.8 cmH20] 7.4 cmH20      - CXR improved  - ABGs prn  - Continue weaning vent as able     Cardiac:   - HDS at this time.  - TTE 5/8 with no evidence of right heart strain or significant valvular pathology, normal LV systolic function, EF 70%     Renal:    - S/p transection of left ureter 4/12 and subsequent ligation and PCT nephrostomy tube placement with IR 4/21  -Fontenot removed 5/16, but has not voided yet  - bladder scans q6hrs  - left kidney is making adequate urine  - Monitor I/Os       Intake/Output Summary (Last 24 hours) at 5/16/2019 0750  Last data filed at 5/16/2019 0700  Gross per 24 hour   Intake 1283 ml   Output 905 ml   Net 378 ml        ID:   - febrile yesterday and borderline this morning  - Blood 5/10 NGTD  - Ucx 5/13 candiduria  - BAL 5/18 negative  - C diff 5/5 negative  - WBC trending down     Hem/Onc:   - H/H stable  - check Hgb prn for bleeding     Endocrine:  - DM Type 2  - TF @ goal  - LDSSI  - Endocrine following for assistance with blood glucose control.      Fluids/Electrolytes/Nutrition/GI:   - Free water flushes 300 cc q6h  - Replace electrolytes PRN  - MADHURI drain removed     # rectal ulcers  - continued intermittent hematochezia  - anoscopy and hemostatic agent  applied by CRS 5/15     PPx:  - DVT: Lovenox, Hep gtt held for continued bloody BMs        DISPO:  - Continue SICU care  - Preparing for LTACH once bloody BMs are managed            Critical secondary to Patient has a condition that poses threat to life and bodily function: active lower GI bleeding requiring close monitoring and intermittent transfusion     Critical care was time spent personally by me on the following activities: development of treatment plan with patient or surrogate and bedside caregivers, discussions with consultants, evaluation of patient's response to treatment, examination of patient, ordering and performing treatments and interventions, ordering and review of laboratory studies, ordering and review of radiographic studies, pulse oximetry, re-evaluation of patient's condition.  This critical care time did not overlap with that of any other provider or involve time for any procedures.     ISABELLE Carter MD  Critical Care - Surgery  [facility]

## 2019-05-16 NOTE — PLAN OF CARE
Problem: Adult Inpatient Plan of Care  Goal: Plan of Care Review  Outcome: Ongoing (interventions implemented as appropriate)  Pt kept free from falls and injuries during shift VSS on vent. 40% 5 PEEP. Pt AAO x 4. NSR- ST on monitor. SBP maintained <180. Tf @ goal of 40 with minimal residuals. Left nephrostomy tube w 125-150 yellow output Q4h. MADHURI with minimal serous output. Pt with several lard liquid bowel movements. Originally dark red/dark brown progressing to bright red throughout shift. MD aware. CBC trended. H/H stable at this time.Barrier cream applied to skin. Weight shift assistance provided. Pressure points protected. Will continue to monitor.

## 2019-05-16 NOTE — PROGRESS NOTES
Srinivas notified of bright red blood pooling from pts rectum. CBC drawn. VSS at this time. Will continue to monitor.

## 2019-05-16 NOTE — PT/OT/SLP PROGRESS
Occupational Therapy      Patient Name:  Mariann Huff   MRN:  [id number]    Patient not seen today secondary to MD hold (Comment). Pt on hold today 2/2 rectal bleeding following LE and EOB movement. Will follow-up tomorrow if pt is medically appropriate for therapy.    Ana Miller OT  5/16/2019

## 2019-05-16 NOTE — PROGRESS NOTES
[facility]  Urology  Progress Note    Patient Name: Mariann Huff  MRN: [id number]  Admission Date: 4/20/2019  Hospital Length of Stay: 26 days  Code Status: Full Code   Attending Provider: Robin Boyd MD   Primary Care Physician: Jasbir Haney MD    Subjective:     HPI:  Mariann Huff is a 59 y.o. female with history of HTN, type 2 diabetes mellitus, CAD, NSTEMI, and back pain who presents to [facility] with altered mental status and sepsis. She underwent L5-S1 OLIF with NSGY on 4/12 and had intraoperative left ureteral injury with ureteroureteral anastomosis and ureteral stent placement. She did well initially and had correa and MADHURI drain removed on 4/16. She began having chills and altered mental status 2 days ago and this has progressively worsened. No complaints of pain.     She is altered and HPI is limited. In the ED she is febrile to 103 and tachycardic and pressures are low normal. WBC is 5, creatinine is 3.6 baseline 1.0, lactate is 4.6. Cath UA concerning for infection, 3+ LE, >100 WBCs, and many bacteria on microscopy.    CT and MRI abdomen both show air with minimal fluid near the surgical site with left ureter coursing through. There is air throughout the proximal collecting system which is decompressed with JJ ureteral stent in good position.    Taken for ex lap, ligation of left ureter and left neph tube placement on 4/21/19.    Interval History: Overnight patient febrile to 102.6, currently at 100.3. On vent, HDS without pressors. L nephrostomy output 625 cc. Correa output yesterday morning 145 cc, since removed. Patient denies sensation of bladder fullness. Cr stable at 1.1.  Drain output 10 cc. WBC downtrending on Ancef and rifampin, 5/13 UCx grew Candida albicans.    Review of Systems  Objective:     Temp:  [99.6 °F (37.6 °C)-102.6 °F (39.2 °C)] 100.3 °F (37.9 °C)  Pulse:  [] 101  Resp:  [2-32] 26  SpO2:  [95 %-100 %] 98 %  BP: (111-193)/(59-99) 133/68     Body mass index is  30.45 kg/m².      Bladder Scan Volume (mL): 27 mL (05/10/19 0846)  Post Void Cath Residual Output (mL): 27 mL (05/10/19 0846)    Drains     Drain                 Closed/Suction Drain 04/21/19 0300 LLQ 25 days         Nephrostomy 04/21/19 0629 Left 8 Fr. 25 days         Gastrostomy/Enterostomy 05/02/19 1134 Percutaneous endoscopic gastrostomy (PEG) LUQ decompression;feeding 13 days                Physical Exam    Significant Labs:    BMP:  Recent Labs   Lab 05/14/19  0300 05/15/19  0300 05/16/19  0328    144 143   K 4.4 4.3 4.3   * 112* 112*   CO2 24 23 24   BUN 41* 33* 37*   CREATININE 1.3 1.1 1.1   CALCIUM 8.4* 8.7 8.6*       CBC:   Recent Labs   Lab 05/15/19  1326 05/16/19  0009 05/16/19  0328   WBC 16.39* 13.70* 11.07   HGB 8.8* 8.0* 9.5*   HCT 27.0* 25.2* 30.2*    247 199       Significant Imaging:  All pertinent imaging results/findings from the past 24 hours have been reviewed.                  Assessment/Plan:     * Ureteral transection of left native kidney  Mariann Huff is a 59 y.o. female s/p left ureteral injury on 4/12, presented with fever, AMS, and intraabdominal abscess, taken for washout and ligation of left ureter with neph tube placement on 4/21, Trach/PEG 5/2.    - Tube feeds per SICU  - Ancef, Rifampin per ID recs   - 5/13 UCx growing Candida albicans; will add Diflucan  - Maintain neph tube, and MADHURI drain, Fontenot removed yesterday; will get bladder scan  - Urine output low, Cr stable at 1.1  - diuresis/fluids per SICU    Dispo: continue ICU care; plan for transfer to LTAC in future    Sepsis  As above        VTE Risk Mitigation (From admission, onward)        Ordered     IP VTE LOW RISK PATIENT  Once      05/08/19 1315     Place sequential compression device  Until discontinued      04/20/19 9210          Mook Ferguson MD  Urology  [facility]

## 2019-05-16 NOTE — NURSING
"Wound care follow up.       Patient continues to have frequent liquid stools related to the GI bleed. There is a small ulceration noted to the perianal area likely related from the continuous moisture. Fungal rash appears improved. Would continue the barrier antifungal cream.     The two back incision sites remain slough covered. There is less than 0.8 cm depth to each side. Wounds cleansed and medihoney applied.   No odor or purulence noted.     Recommend:   Continue barrier antifungal cream BID to the perineal area  Triad paste as needed for moisture barrier between AF applications  Medihoney daily to back wound.  Patient on immerse CALLY mattress.   q2hour turns    Wound care to follow PRN.  Lanny Mireles RN CN [facility]   [phone]    Left back: 1 x 0.6 x 0.8    Right back 1 x 0.8 x 0.8    Perirectal" 1.4 x 1 x 0.2        "

## 2019-05-17 LAB
ABO + RH BLD: NORMAL
ALBUMIN SERPL BCP-MCNC: 1.5 G/DL (ref 3.5–5.2)
ALP SERPL-CCNC: 92 U/L (ref 55–135)
ALT SERPL W/O P-5'-P-CCNC: <5 U/L (ref 10–44)
ANION GAP SERPL CALC-SCNC: 8 MMOL/L (ref 8–16)
ANISOCYTOSIS BLD QL SMEAR: SLIGHT
APTT BLDCRRT: <21 SEC (ref 21–32)
AST SERPL-CCNC: 23 U/L (ref 10–40)
BASO STIPL BLD QL SMEAR: ABNORMAL
BASOPHILS # BLD AUTO: 0.07 K/UL (ref 0–0.2)
BASOPHILS # BLD AUTO: 0.07 K/UL (ref 0–0.2)
BASOPHILS # BLD AUTO: 0.08 K/UL (ref 0–0.2)
BASOPHILS # BLD AUTO: 0.09 K/UL (ref 0–0.2)
BASOPHILS NFR BLD: 0.5 % (ref 0–1.9)
BASOPHILS NFR BLD: 0.5 % (ref 0–1.9)
BASOPHILS NFR BLD: 0.6 % (ref 0–1.9)
BASOPHILS NFR BLD: 0.7 % (ref 0–1.9)
BILIRUB SERPL-MCNC: 0.4 MG/DL (ref 0.1–1)
BLD GP AB SCN CELLS X3 SERPL QL: NORMAL
BUN SERPL-MCNC: 36 MG/DL (ref 6–20)
CALCIUM SERPL-MCNC: 8.4 MG/DL (ref 8.7–10.5)
CHLORIDE SERPL-SCNC: 115 MMOL/L (ref 95–110)
CO2 SERPL-SCNC: 23 MMOL/L (ref 23–29)
CREAT SERPL-MCNC: 0.8 MG/DL (ref 0.5–1.4)
DIFFERENTIAL METHOD: ABNORMAL
EOSINOPHIL # BLD AUTO: 0.3 K/UL (ref 0–0.5)
EOSINOPHIL # BLD AUTO: 0.4 K/UL (ref 0–0.5)
EOSINOPHIL # BLD AUTO: 0.4 K/UL (ref 0–0.5)
EOSINOPHIL # BLD AUTO: 0.5 K/UL (ref 0–0.5)
EOSINOPHIL NFR BLD: 2.5 % (ref 0–8)
EOSINOPHIL NFR BLD: 2.6 % (ref 0–8)
EOSINOPHIL NFR BLD: 3.2 % (ref 0–8)
EOSINOPHIL NFR BLD: 3.2 % (ref 0–8)
ERYTHROCYTE [DISTWIDTH] IN BLOOD BY AUTOMATED COUNT: 14.9 % (ref 11.5–14.5)
ERYTHROCYTE [DISTWIDTH] IN BLOOD BY AUTOMATED COUNT: 15.1 % (ref 11.5–14.5)
ERYTHROCYTE [DISTWIDTH] IN BLOOD BY AUTOMATED COUNT: 15.2 % (ref 11.5–14.5)
ERYTHROCYTE [DISTWIDTH] IN BLOOD BY AUTOMATED COUNT: 15.3 % (ref 11.5–14.5)
EST. GFR  (AFRICAN AMERICAN): >60 ML/MIN/1.73 M^2
EST. GFR  (NON AFRICAN AMERICAN): >60 ML/MIN/1.73 M^2
GLUCOSE SERPL-MCNC: 179 MG/DL (ref 70–110)
HCT VFR BLD AUTO: 23 % (ref 37–48.5)
HCT VFR BLD AUTO: 23.9 % (ref 37–48.5)
HCT VFR BLD AUTO: 24 % (ref 37–48.5)
HCT VFR BLD AUTO: 25.3 % (ref 37–48.5)
HGB BLD-MCNC: 7.4 G/DL (ref 12–16)
HGB BLD-MCNC: 7.6 G/DL (ref 12–16)
HGB BLD-MCNC: 7.7 G/DL (ref 12–16)
HGB BLD-MCNC: 8.1 G/DL (ref 12–16)
HYPOCHROMIA BLD QL SMEAR: ABNORMAL
IMM GRANULOCYTES # BLD AUTO: 0.1 K/UL (ref 0–0.04)
IMM GRANULOCYTES # BLD AUTO: 0.1 K/UL (ref 0–0.04)
IMM GRANULOCYTES # BLD AUTO: 0.12 K/UL (ref 0–0.04)
IMM GRANULOCYTES # BLD AUTO: 0.13 K/UL (ref 0–0.04)
IMM GRANULOCYTES NFR BLD AUTO: 0.7 % (ref 0–0.5)
IMM GRANULOCYTES NFR BLD AUTO: 0.8 % (ref 0–0.5)
IMM GRANULOCYTES NFR BLD AUTO: 0.8 % (ref 0–0.5)
IMM GRANULOCYTES NFR BLD AUTO: 0.9 % (ref 0–0.5)
INR PPP: 1 (ref 0.8–1.2)
LYMPHOCYTES # BLD AUTO: 1.6 K/UL (ref 1–4.8)
LYMPHOCYTES # BLD AUTO: 1.8 K/UL (ref 1–4.8)
LYMPHOCYTES # BLD AUTO: 2 K/UL (ref 1–4.8)
LYMPHOCYTES # BLD AUTO: 2.1 K/UL (ref 1–4.8)
LYMPHOCYTES NFR BLD: 10.4 % (ref 18–48)
LYMPHOCYTES NFR BLD: 14.1 % (ref 18–48)
LYMPHOCYTES NFR BLD: 14.2 % (ref 18–48)
LYMPHOCYTES NFR BLD: 14.5 % (ref 18–48)
MAGNESIUM SERPL-MCNC: 1.8 MG/DL (ref 1.6–2.6)
MCH RBC QN AUTO: 28.5 PG (ref 27–31)
MCH RBC QN AUTO: 28.5 PG (ref 27–31)
MCH RBC QN AUTO: 28.8 PG (ref 27–31)
MCH RBC QN AUTO: 29 PG (ref 27–31)
MCHC RBC AUTO-ENTMCNC: 31.8 G/DL (ref 32–36)
MCHC RBC AUTO-ENTMCNC: 32 G/DL (ref 32–36)
MCHC RBC AUTO-ENTMCNC: 32.1 G/DL (ref 32–36)
MCHC RBC AUTO-ENTMCNC: 32.2 G/DL (ref 32–36)
MCV RBC AUTO: 89 FL (ref 82–98)
MCV RBC AUTO: 90 FL (ref 82–98)
MONOCYTES # BLD AUTO: 0.9 K/UL (ref 0.3–1)
MONOCYTES # BLD AUTO: 1 K/UL (ref 0.3–1)
MONOCYTES # BLD AUTO: 1.2 K/UL (ref 0.3–1)
MONOCYTES # BLD AUTO: 1.3 K/UL (ref 0.3–1)
MONOCYTES NFR BLD: 6.6 % (ref 4–15)
MONOCYTES NFR BLD: 7.2 % (ref 4–15)
MONOCYTES NFR BLD: 8.8 % (ref 4–15)
MONOCYTES NFR BLD: 8.9 % (ref 4–15)
NEUTROPHILS # BLD AUTO: 10.7 K/UL (ref 1.8–7.7)
NEUTROPHILS # BLD AUTO: 11.9 K/UL (ref 1.8–7.7)
NEUTROPHILS # BLD AUTO: 9.7 K/UL (ref 1.8–7.7)
NEUTROPHILS # BLD AUTO: 9.8 K/UL (ref 1.8–7.7)
NEUTROPHILS NFR BLD: 72.1 % (ref 38–73)
NEUTROPHILS NFR BLD: 72.5 % (ref 38–73)
NEUTROPHILS NFR BLD: 74.6 % (ref 38–73)
NEUTROPHILS NFR BLD: 79.1 % (ref 38–73)
NRBC BLD-RTO: 0 /100 WBC
PHOSPHATE SERPL-MCNC: 2.9 MG/DL (ref 2.7–4.5)
PLATELET # BLD AUTO: 177 K/UL (ref 150–350)
PLATELET # BLD AUTO: 190 K/UL (ref 150–350)
PLATELET # BLD AUTO: 197 K/UL (ref 150–350)
PLATELET # BLD AUTO: 202 K/UL (ref 150–350)
PLATELET BLD QL SMEAR: ABNORMAL
PMV BLD AUTO: 10.7 FL (ref 9.2–12.9)
PMV BLD AUTO: 10.7 FL (ref 9.2–12.9)
PMV BLD AUTO: 11.1 FL (ref 9.2–12.9)
PMV BLD AUTO: 11.4 FL (ref 9.2–12.9)
POCT GLUCOSE: 183 MG/DL (ref 70–110)
POCT GLUCOSE: 184 MG/DL (ref 70–110)
POCT GLUCOSE: 205 MG/DL (ref 70–110)
POCT GLUCOSE: 210 MG/DL (ref 70–110)
POCT GLUCOSE: 216 MG/DL (ref 70–110)
POCT GLUCOSE: 272 MG/DL (ref 70–110)
POIKILOCYTOSIS BLD QL SMEAR: ABNORMAL
POLYCHROMASIA BLD QL SMEAR: ABNORMAL
POTASSIUM SERPL-SCNC: 4.4 MMOL/L (ref 3.5–5.1)
PROT SERPL-MCNC: 5.1 G/DL (ref 6–8.4)
PROTHROMBIN TIME: 10.7 SEC (ref 9–12.5)
RBC # BLD AUTO: 2.55 M/UL (ref 4–5.4)
RBC # BLD AUTO: 2.67 M/UL (ref 4–5.4)
RBC # BLD AUTO: 2.67 M/UL (ref 4–5.4)
RBC # BLD AUTO: 2.84 M/UL (ref 4–5.4)
SODIUM SERPL-SCNC: 146 MMOL/L (ref 136–145)
WBC # BLD AUTO: 12.97 K/UL (ref 3.9–12.7)
WBC # BLD AUTO: 13.63 K/UL (ref 3.9–12.7)
WBC # BLD AUTO: 14.82 K/UL (ref 3.9–12.7)
WBC # BLD AUTO: 15.05 K/UL (ref 3.9–12.7)

## 2019-05-17 PROCEDURE — 27000221 HC OXYGEN, UP TO 24 HOURS

## 2019-05-17 PROCEDURE — 63600175 PHARM REV CODE 636 W HCPCS: Performed by: STUDENT IN AN ORGANIZED HEALTH CARE EDUCATION/TRAINING PROGRAM

## 2019-05-17 PROCEDURE — 25000003 PHARM REV CODE 250: Performed by: STUDENT IN AN ORGANIZED HEALTH CARE EDUCATION/TRAINING PROGRAM

## 2019-05-17 PROCEDURE — 99232 SBSQ HOSP IP/OBS MODERATE 35: CPT | Mod: ,,, | Performed by: SURGERY

## 2019-05-17 PROCEDURE — 84100 ASSAY OF PHOSPHORUS: CPT

## 2019-05-17 PROCEDURE — 27100171 HC OXYGEN HIGH FLOW UP TO 24 HOURS

## 2019-05-17 PROCEDURE — 99900035 HC TECH TIME PER 15 MIN (STAT)

## 2019-05-17 PROCEDURE — 80053 COMPREHEN METABOLIC PANEL: CPT

## 2019-05-17 PROCEDURE — 31720 CLEARANCE OF AIRWAYS: CPT

## 2019-05-17 PROCEDURE — 83735 ASSAY OF MAGNESIUM: CPT

## 2019-05-17 PROCEDURE — 94761 N-INVAS EAR/PLS OXIMETRY MLT: CPT

## 2019-05-17 PROCEDURE — 85610 PROTHROMBIN TIME: CPT

## 2019-05-17 PROCEDURE — 85730 THROMBOPLASTIN TIME PARTIAL: CPT

## 2019-05-17 PROCEDURE — 99900026 HC AIRWAY MAINTENANCE (STAT)

## 2019-05-17 PROCEDURE — 20000000 HC ICU ROOM

## 2019-05-17 PROCEDURE — C9113 INJ PANTOPRAZOLE SODIUM, VIA: HCPCS | Performed by: STUDENT IN AN ORGANIZED HEALTH CARE EDUCATION/TRAINING PROGRAM

## 2019-05-17 PROCEDURE — 85025 COMPLETE CBC W/AUTO DIFF WBC: CPT | Mod: 91

## 2019-05-17 PROCEDURE — 63600175 PHARM REV CODE 636 W HCPCS: Mod: JG | Performed by: STUDENT IN AN ORGANIZED HEALTH CARE EDUCATION/TRAINING PROGRAM

## 2019-05-17 PROCEDURE — 36430 TRANSFUSION BLD/BLD COMPNT: CPT

## 2019-05-17 PROCEDURE — 27100092 HC HIGH FLOW DELIVERY CANNULA

## 2019-05-17 PROCEDURE — 99232 PR SUBSEQUENT HOSPITAL CARE,LEVL II: ICD-10-PCS | Mod: ,,, | Performed by: SURGERY

## 2019-05-17 RX ORDER — INSULIN ASPART 100 [IU]/ML
4 INJECTION, SOLUTION INTRAVENOUS; SUBCUTANEOUS
Status: DISCONTINUED | OUTPATIENT
Start: 2019-05-18 | End: 2019-05-19

## 2019-05-17 RX ORDER — INSULIN ASPART 100 [IU]/ML
4 INJECTION, SOLUTION INTRAVENOUS; SUBCUTANEOUS
Status: DISCONTINUED | OUTPATIENT
Start: 2019-05-17 | End: 2019-05-19

## 2019-05-17 RX ORDER — PANTOPRAZOLE SODIUM 40 MG/1
40 FOR SUSPENSION ORAL DAILY
Status: DISCONTINUED | OUTPATIENT
Start: 2019-05-18 | End: 2019-06-05 | Stop reason: HOSPADM

## 2019-05-17 RX ADMIN — PANTOPRAZOLE SODIUM 40 MG: 40 INJECTION, POWDER, FOR SOLUTION INTRAVENOUS at 08:05

## 2019-05-17 RX ADMIN — CEFAZOLIN 2 G: 1 INJECTION, POWDER, FOR SOLUTION INTRAMUSCULAR; INTRAVENOUS at 02:05

## 2019-05-17 RX ADMIN — HYDRALAZINE HYDROCHLORIDE 10 MG: 20 INJECTION INTRAMUSCULAR; INTRAVENOUS at 12:05

## 2019-05-17 RX ADMIN — Medication 2 MG: at 10:05

## 2019-05-17 RX ADMIN — Medication 2 MG: at 08:05

## 2019-05-17 RX ADMIN — INSULIN ASPART 1 UNITS: 100 INJECTION, SOLUTION INTRAVENOUS; SUBCUTANEOUS at 09:05

## 2019-05-17 RX ADMIN — INSULIN ASPART 3 UNITS: 100 INJECTION, SOLUTION INTRAVENOUS; SUBCUTANEOUS at 08:05

## 2019-05-17 RX ADMIN — INSULIN ASPART 3 UNITS: 100 INJECTION, SOLUTION INTRAVENOUS; SUBCUTANEOUS at 12:05

## 2019-05-17 RX ADMIN — MICONAZOLE NITRATE: 20 OINTMENT TOPICAL at 09:05

## 2019-05-17 RX ADMIN — Medication 2 MG: at 04:05

## 2019-05-17 RX ADMIN — FLUCONAZOLE 200 MG: 40 POWDER, FOR SUSPENSION ORAL at 10:05

## 2019-05-17 RX ADMIN — HYDRALAZINE HYDROCHLORIDE: 20 INJECTION INTRAMUSCULAR; INTRAVENOUS at 09:05

## 2019-05-17 RX ADMIN — INSULIN ASPART 3 UNITS: 100 INJECTION, SOLUTION INTRAVENOUS; SUBCUTANEOUS at 04:05

## 2019-05-17 RX ADMIN — LABETALOL HCL IV SOLN PREFILLED SYRINGE 20 MG/4ML (5 MG/ML) 10 MG: 20/4 SOLUTION PREFILLED SYRINGE at 04:05

## 2019-05-17 RX ADMIN — ALTEPLASE 2 MG: 2.2 INJECTION, POWDER, LYOPHILIZED, FOR SOLUTION INTRAVENOUS at 05:05

## 2019-05-17 RX ADMIN — HYDROMORPHONE HYDROCHLORIDE 1 MG: 1 INJECTION, SOLUTION INTRAMUSCULAR; INTRAVENOUS; SUBCUTANEOUS at 09:05

## 2019-05-17 RX ADMIN — INSULIN ASPART 4 UNITS: 100 INJECTION, SOLUTION INTRAVENOUS; SUBCUTANEOUS at 09:05

## 2019-05-17 RX ADMIN — SILODOSIN 4 MG: 4 CAPSULE ORAL at 08:05

## 2019-05-17 RX ADMIN — RIFAMPIN 300 MG: 600 INJECTION, POWDER, LYOPHILIZED, FOR SOLUTION INTRAVENOUS at 02:05

## 2019-05-17 RX ADMIN — CEFAZOLIN 2 G: 1 INJECTION, POWDER, FOR SOLUTION INTRAMUSCULAR; INTRAVENOUS at 05:05

## 2019-05-17 RX ADMIN — INSULIN ASPART 2 UNITS: 100 INJECTION, SOLUTION INTRAVENOUS; SUBCUTANEOUS at 12:05

## 2019-05-17 RX ADMIN — CHLORHEXIDINE GLUCONATE 0.12% ORAL RINSE 15 ML: 1.2 LIQUID ORAL at 08:05

## 2019-05-17 RX ADMIN — INSULIN ASPART 1 UNITS: 100 INJECTION, SOLUTION INTRAVENOUS; SUBCUTANEOUS at 12:05

## 2019-05-17 RX ADMIN — CEFAZOLIN 2 G: 1 INJECTION, POWDER, FOR SOLUTION INTRAMUSCULAR; INTRAVENOUS at 10:05

## 2019-05-17 RX ADMIN — HYDROMORPHONE HYDROCHLORIDE 1 MG: 1 INJECTION, SOLUTION INTRAMUSCULAR; INTRAVENOUS; SUBCUTANEOUS at 12:05

## 2019-05-17 RX ADMIN — Medication 2 MG: at 12:05

## 2019-05-17 RX ADMIN — MICONAZOLE NITRATE: 20 OINTMENT TOPICAL at 08:05

## 2019-05-17 RX ADMIN — CHLORHEXIDINE GLUCONATE 0.12% ORAL RINSE 15 ML: 1.2 LIQUID ORAL at 09:05

## 2019-05-17 RX ADMIN — CARVEDILOL 3.12 MG: 3.12 TABLET, FILM COATED ORAL at 08:05

## 2019-05-17 RX ADMIN — INSULIN ASPART 2 UNITS: 100 INJECTION, SOLUTION INTRAVENOUS; SUBCUTANEOUS at 04:05

## 2019-05-17 RX ADMIN — ACETAMINOPHEN 650 MG: 325 TABLET ORAL at 08:05

## 2019-05-17 RX ADMIN — RIFAMPIN 300 MG: 600 INJECTION, POWDER, LYOPHILIZED, FOR SOLUTION INTRAVENOUS at 03:05

## 2019-05-17 RX ADMIN — HYDROMORPHONE HYDROCHLORIDE 1 MG: 1 INJECTION, SOLUTION INTRAMUSCULAR; INTRAVENOUS; SUBCUTANEOUS at 06:05

## 2019-05-17 NOTE — PT/OT/SLP PROGRESS
Occupational Therapy      Patient Name:  Mariann Huff   MRN:  [id number]    Patient not seen today secondary to MD hold (Comment). Pt still on MD hold due to rectal bleeding made worse with movement. Will follow-up on Monday if pt is medically appropriate.    Ana Miller OT  5/17/2019

## 2019-05-17 NOTE — ASSESSMENT & PLAN NOTE
ASSESSMENT/PLAN:   Mariann Huff is a 59 y.o. female s/p left ureteral injury on 4/12, who presented with fever, AMS, and intraabdominal abscess, taken for washout and ligation of left ureter with nephrostomy tube placement on 4/21. S/p Trach/PEG on 5/2. Episode of negative pressure pulmonary edema on 5/8 requiring mechanical ventilation, diuresis and low dose pressor. Has been plagued by intermittent BRBPR.     Neuro:   - off sedation  - responds to questions appropriately by nodding  - no focal deficit     Pulmonary:   Moved from PAV+ to trach collar on 5/17 and tolerating well  - CXR pending  - ABGs prn     Cardiac:   - HDS at this time  - did have some tachycardia associated with HTN yesterday  - TTE 5/8 with no evidence of right heart strain or significant valvular pathology, normal LV systolic function, EF 70%     Renal:    - S/p transection of left ureter 4/12 and subsequent ligation and PCT nephrostomy tube placement with IR 4/21  - Fontenot removed 5/15, but then replaced for retention   - fluconazole for candiduria  - making adequate urine  - Monitor I/Os       Intake/Output Summary (Last 24 hours) at 5/17/2019 0602  Last data filed at 5/17/2019 0500  Gross per 24 hour   Intake 2529 ml   Output 1600 ml   Net 929 ml        ID:   - afebrile  - Blood 5/10 NGTD  - Ucx 5/13 candiduria  - BAL 5/18 negative  - C diff 5/5 negative  - WBC trending down     Hem/Onc:   - H/H fell 2/2 BRBPR  - transfused 2U evening of 5/16  - check Hgb prn for bleeding     Endocrine:  - DM Type 2  - TF @ goal  - LDSSI  - Endocrine following for assistance with blood glucose control.      Fluids/Electrolytes/Nutrition/GI:   - Free water flushes 300 cc q6h  - Replace electrolytes PRN     # rectal ulcers  - continued intermittent hematochezia  - anoscopy and hemostatic agent applied by CRS 5/15     PPx:  - DVT: Lovenox, Hep gtt held for continued bloody BMs        DISPO:  - Continue SICU care  - Preparing for LTACH once bloody BMs are  managed

## 2019-05-17 NOTE — SUBJECTIVE & OBJECTIVE
Subjective:     Interval History: still having int bloody diarrhea, better overnight and stool slightly more thick    Post-Op Info:  Procedure(s) (LRB):  SIGMOIDOSCOPY, FLEXIBLE (N/A)   4 Days Post-Op      Medications:  Continuous Infusions:  Scheduled Meds:   carvedilol  3.125 mg Per G Tube BID    ceFAZolin (ANCEF) IVPB  2 g Intravenous Q8H    chlorhexidine  15 mL Mouth/Throat BID    fluconazole 40 mg/ml  200 mg Per G Tube Daily    insulin aspart U-100  3 Units Subcutaneous Q24H    insulin aspart U-100  3 Units Subcutaneous Q24H    insulin aspart U-100  3 Units Subcutaneous Q24H    insulin aspart U-100  3 Units Subcutaneous Q24H    insulin aspart U-100  3 Units Subcutaneous Q24H    insulin aspart U-100  3 Units Subcutaneous Q24H    loperamide  2 mg Per G Tube QID    miconazole nitrate 2%   Topical (Top) BID    [START ON 5/18/2019] pantoprazole  40 mg Per G Tube Daily    rifAMpin (RIFADIN) IVPB  300 mg Intravenous Q12H    silodosin  4 mg Per G Tube Daily     PRN Meds:   acetaminophen    albuterol-ipratropium    dextrose 50%    glucagon (human recombinant)    hydrALAZINE    HYDROmorphone    hydrOXYzine    insulin aspart U-100    labetalol    magnesium sulfate IVPB    magnesium sulfate IVPB    potassium chloride in water    promethazine (PHENERGAN) IVPB    sodium chloride 0.9%        Objective:     Vital Signs (Most Recent):  Temp: 98.4 °F (36.9 °C) (05/17/19 0700)  Pulse: 91 (05/17/19 0930)  Resp: (!) 29 (05/17/19 0930)  BP: 123/63 (05/17/19 0930)  SpO2: 99 % (05/17/19 0930) Vital Signs (24h Range):  Temp:  [98.4 °F (36.9 °C)-99.7 °F (37.6 °C)] 98.4 °F (36.9 °C)  Pulse:  [] 91  Resp:  [7-35] 29  SpO2:  [91 %-100 %] 99 %  BP: (108-191)/(58-95) 123/63     Intake/Output - Last 3 Shifts       05/15 0700 - 05/16 0659 05/16 0700 - 05/17 0659 05/17 0700 - 05/18 0659    I.V. (mL/kg) 313 (3.8) 199 (2.6)     Blood  480     NG/GT 1000 1890 120    IV Piggyback  100     Total Intake(mL/kg)  1313 (15.8) 2669 (35.1) 120 (1.6)    Urine (mL/kg/hr) 890 (0.4) 1735 (1) 150 (0.6)    Drains 20      Stool 0 0     Total Output 910 1735 150    Net +403 +934 -30           Stool Occurrence 4 x 2 x           Physical Exam   Constitutional: No distress.   Eyes: EOM are normal.   Neck: Neck supple.   Cardiovascular: Normal rate and regular rhythm.   Pulmonary/Chest: Effort normal. No respiratory distress.   Abdominal: Soft. She exhibits no distension. There is no tenderness.   Neurological: She is alert.   Skin: Skin is warm and dry.     Significant Labs:  BMP (Last 3 Results):   Recent Labs   Lab 05/15/19  0300 05/16/19  0328 05/17/19  0410   * 189* 179*    143 146*   K 4.3 4.3 4.4   * 112* 115*   CO2 23 24 23   BUN 33* 37* 36*   CREATININE 1.1 1.1 0.8   CALCIUM 8.7 8.6* 8.4*   MG 1.9 1.8 1.8     CBC (Last 3 Results):   Recent Labs   Lab 05/16/19  2246 05/17/19  0214 05/17/19  0622   WBC 16.94* 15.05* 12.97*   RBC 2.86* 2.84* 2.67*   HGB 8.2* 8.1* 7.6*   HCT 26.1* 25.3* 23.9*    177 197   MCV 91 89 90   MCH 28.7 28.5 28.5   MCHC 31.4* 32.0 31.8*       Significant Diagnostics:  I have reviewed all pertinent imaging results/findings within the past 24 hours.

## 2019-05-17 NOTE — SUBJECTIVE & OBJECTIVE
Interval History/Significant Events: Continued rectal bleeding. Hgb fell. Received two units PRBC with partial response. Holding off on packing.     Doing well on trach collar.    Follow-up For: Procedure(s) (LRB):  SIGMOIDOSCOPY, FLEXIBLE (N/A)    Post-Operative Day: 4 Days Post-Op    Objective:     Vital Signs (Most Recent):  Temp: 99.1 °F (37.3 °C) (05/16/19 2300)  Pulse: 99 (05/17/19 0408)  Resp: 19 (05/17/19 0408)  BP: 133/64 (05/17/19 0100)  SpO2: 100 % (05/17/19 0408) Vital Signs (24h Range):  Temp:  [99 °F (37.2 °C)-99.8 °F (37.7 °C)] 99.1 °F (37.3 °C)  Pulse:  [] 99  Resp:  [19-35] 19  SpO2:  [92 %-100 %] 100 %  BP: (108-191)/(58-95) 133/64     Weight: (P) 76 kg (167 lb 8.8 oz)  Body mass index is 27.88 kg/m² (pended).      Intake/Output Summary (Last 24 hours) at 5/17/2019 0555  Last data filed at 5/17/2019 0500  Gross per 24 hour   Intake 2882 ml   Output 1600 ml   Net 1282 ml       Physical Exam   Constitutional: She appears well-developed and well-nourished.   HENT:   Head: Normocephalic and atraumatic.   Tracheostomy in place   Eyes: Right eye exhibits no discharge. Left eye exhibits no discharge.   Neck: Normal range of motion. Neck supple.   Cardiovascular: Normal rate and regular rhythm.   Pulmonary/Chest: Effort normal. No respiratory distress.   Abdominal: Soft.   Midline incision c/d/i.  PEG with no leak. Abdomen soft, non-distended.  Bowel sounds present   Genitourinary:   Genitourinary Comments: Nephrostomy tube in place with thin dark yellow output.  Fontenot removed  She still has significant BRBPR   Musculoskeletal: Normal range of motion.   Neurological: She is alert.   Able to follow commands, denying pain.    Skin: Skin is warm and dry.   Psychiatric: She has a normal mood and affect.   Nursing note and vitals reviewed.      Vents:  Trach collar    Lines/Drains/Airways     Central Venous Catheter Line                 Percutaneous Central Line Insertion/Assessment - triple lumen   05/12/19 1256  4 days         Trialysis (Dialysis) Catheter 05/12/19 1259 left internal jugular 4 days          Drain                 Nephrostomy 04/21/19 0629 Left 8 Fr. 25 days         Gastrostomy/Enterostomy 05/02/19 1134 Percutaneous endoscopic gastrostomy (PEG) LUQ decompression;feeding 14 days         Urethral Catheter 05/16/19 1040 16 Fr. less than 1 day          Airway                 Surgical Airway 05/02/19 1107 Shiley Cuffed 14 days          Peripheral Intravenous Line                 Midline Catheter Insertion/Assessment  - Single Lumen 05/07/19 1632 Left basilic vein (medial side of arm) 18g x 8cm 9 days                Significant Labs:    CBC/Anemia Profile:  Recent Labs   Lab 05/16/19  1200 05/16/19  2246 05/17/19  0214   WBC 13.91* 16.94* 15.05*   HGB 7.2* 8.2* 8.1*   HCT 22.7* 26.1* 25.3*    190 177   MCV 92 91 89   RDW 15.9* 14.9* 14.9*        Chemistries:  Recent Labs   Lab 05/16/19  0328 05/17/19  0410    146*   K 4.3 4.4   * 115*   CO2 24 23   BUN 37* 36*   CREATININE 1.1 0.8   CALCIUM 8.6* 8.4*   ALBUMIN 1.5* 1.5*   PROT 5.5* 5.1*   BILITOT 0.5 0.4   ALKPHOS 99 92   ALT <5* <5*   AST 20 23   MG 1.8 1.8   PHOS 2.8 2.9       All pertinent labs within the past 24 hours have been reviewed.    Significant Imaging:  I have reviewed all pertinent imaging results/findings within the past 24 hours.

## 2019-05-17 NOTE — PLAN OF CARE
Problem: Adult Inpatient Plan of Care  Goal: Patient-Specific Goal (Individualization)  Dx: Urosepsis  Hx: HTN, DM, CAD, NSTEMI s/p stent 2014, and back pain who presents to [facility] with altered mental status and sepsis.     4/12: L5-S1 OLIF with NSGY-intraoperative left ureteral injury with ureteroureteral anastomosis and ureteral stent placement. Did well and was discharged home  4/20: ED for AMS, temp 103, tachy  4/21: Admitted to SICU after emergent ex-lap, IR for L nephrostomy placement, levo, vaso, insulin, propofol, blood cultures, flotrac, 3L LR, 800 isolyte, 2 units PRBC, 5L bolus  4/22: 500 albumin, vaso off  4/23: levo off  4/24: switched to precedex, blood culture  4/25:-4/29: SBT trials failed   4/30:  Blood cx.'s x2, VAP (BAL) cx  5/1: T-max: 101.7; CT Chest/ABD/pelvis w/contrast  5/2: Trach and PEG placed today, Accuchecks changed to Q2H.  5/3: US for right arm due to swelling. Midline placed, Trach collar trial successful, Trickle PEG tube feeds started. Heparing gtt started.   5/5: Bloody BMs x 4, H/H dropping  5/6: 1 unit PRBC's  5/7: Heparin gtt started.   5/8:  Pulmonary edema, R. Fem. TLC & R. Fem. A-line, CT-chest to r/o PE (neg.), Echo, Bronch., US BLE, CXR, 12-lead EKG.  Epi. gtts started.  5/9: VSS, PAV trial today--tolerating well, Heparin gtts infusing  5/11: 2u RBC  5/13; Sigmoidoscopy @ bedside, large bloody BM @ 2200; repacked with surgicel and colorectal team notified  5/14: 2 unit PRBC's    5/16- PRBCs x2    Nursing:  - MAP >65, SBP <180   - Accuchecks Q4H                                  Outcome: Revised  Pt alert and oriented, remained on ATC at minimal settings throughout night.  Pt with BRBPR at beginning of shift with clots.  PRBCs x2 transfused.  VSS, NAD noted.  UOP marginal per correa, nephrostomy tube as noted.  Plan of care reviewed and discussed.  Questions encouraged.

## 2019-05-17 NOTE — ASSESSMENT & PLAN NOTE
Elizaamanda Huff is a 59 y.o. female s/p left ureteral injury on 4/12, presented with fever, AMS, and intraabdominal abscess, taken for washout and ligation of left ureter with neph tube placement on 4/21, Trach/PEG 5/2.  - patient now on trach collar  - Tube feeds per SICU  - Ancef, Rifampin per ID recs   - 5/13 UCx growing Candida albicans; started course of Diflucan  - Maintain neph tube and Fontenot  - Urine output adequate, Cr stable at 0.8  - diuresis/fluids per SICU  - continues to have bloody BM, Colorectal on board, appreciate recs; H&H currently    Dispo: continue ICU care; plan for transfer to LTAC once bloody BM resolve

## 2019-05-17 NOTE — SUBJECTIVE & OBJECTIVE
"Interval HPI:   Overnight events:  BG is within or slightly above goal on current SQ insulin regimen.     Eating:   NPO  Nausea: No  Hypoglycemia and intervention: No  Fever: No  TPN and/or TF: Yes (TF).  If yes, type of TF/TPN and rate: 40 ml/hr    BP (!) 173/84   Pulse (!) 127   Temp 99.1 °F (37.3 °C)   Resp (!) 28   Ht 5' 5" (1.651 m)   Wt 83 kg (182 lb 15.7 oz)   SpO2 98%   Breastfeeding? No   BMI 30.45 kg/m²     Labs Reviewed and Include    Recent Labs   Lab 05/16/19  0328   *   CALCIUM 8.6*   ALBUMIN 1.5*   PROT 5.5*      K 4.3   CO2 24   *   BUN 37*   CREATININE 1.1   ALKPHOS 99   ALT <5*   AST 20   BILITOT 0.5     Lab Results   Component Value Date    WBC 13.91 (H) 05/16/2019    HGB 7.2 (L) 05/16/2019    HCT 22.7 (L) 05/16/2019    MCV 92 05/16/2019     05/16/2019     No results for input(s): TSH, FREET4 in the last 168 hours.  Lab Results   Component Value Date    HGBA1C 10.8 (H) 02/19/2019       Nutritional status:   Body mass index is 30.45 kg/m².  Lab Results   Component Value Date    ALBUMIN 1.5 (L) 05/16/2019    ALBUMIN 1.6 (L) 05/15/2019    ALBUMIN 1.6 (L) 05/14/2019     No results found for: PREALBUMIN    Estimated Creatinine Clearance: 59.3 mL/min (based on SCr of 1.1 mg/dL).    Accu-Checks  Recent Labs     05/15/19  0733 05/15/19  1219 05/15/19  1557 05/15/19  2007 05/16/19  0016 05/16/19  0341 05/16/19  0750 05/16/19  1225 05/16/19  1555 05/16/19 1952   POCTGLUCOSE 148* 189* 185* 181* 186* 213* 201* 173* 180* 208*       Current Medications and/or Treatments Impacting Glycemic Control  Immunotherapy:    Immunosuppressants     None        Steroids:   Hormones (From admission, onward)    None        Pressors:    Autonomic Drugs (From admission, onward)    None        Hyperglycemia/Diabetes Medications:   Antihyperglycemics (From admission, onward)    Start     Stop Route Frequency Ordered    05/11/19 1200  insulin aspart U-100 pen 3 Units      -- SubQ Every 24 hours " (non-standard times) 05/10/19 1351    05/11/19 0800  insulin aspart U-100 pen 3 Units      -- SubQ Every 24 hours (non-standard times) 05/10/19 1351    05/11/19 0400  insulin aspart U-100 pen 3 Units      -- SubQ Every 24 hours (non-standard times) 05/10/19 1351    05/11/19 0000  insulin aspart U-100 pen 3 Units      -- SubQ Every 24 hours (non-standard times) 05/10/19 1351    05/10/19 2000  insulin aspart U-100 pen 3 Units      -- SubQ Every 24 hours (non-standard times) 05/10/19 1351    05/10/19 1600  insulin aspart U-100 pen 3 Units      -- SubQ Every 24 hours (non-standard times) 05/10/19 1351    05/10/19 1450  insulin aspart U-100 pen 0-5 Units      -- SubQ Every 4 hours PRN 05/10/19 1351

## 2019-05-17 NOTE — NURSING
SICU team made aware of patient's urine output 25-30 cc/hr. No orders given at this time. Will continue to monitor.

## 2019-05-17 NOTE — PLAN OF CARE
05/17/19 1621   Discharge Assessment   Assessment Type Discharge Planning Reassessment   Discharge Plan A Long-term acute care facility (LTAC)   Discharge Plan B Rehab   DME Needed Upon Discharge  medication pump;walker, rolling;shower chair  (Pt will need IV ABX x 6 weeks end date 6/4/2019)   Patient/Family in Agreement with Plan other (see comments)  (SICU and Ur team to open discussion with patient/family for LTAC transfer)     Pt remains in SICU. No plan for transfer to LTAC initiated at this time. Pending SICU and UR.

## 2019-05-17 NOTE — PROGRESS NOTES
"[facility]  Endocrinology  Progress Note    Admit Date: 4/20/2019     Reason for Consult: Management of T2DM, Hyperglycemia     Surgical Procedure and Date:       04/21/2019:         1. Exploratory laparotomy        2. Ligation of left ureter        3. Removal of left JJ ureteral stent      Diabetes diagnosis year: ANDRE    Home Diabetes Medications:  ANDRE  Toujeo 50 BID  Invokana 300mg daily   Novolog 35 units AM, 45 units PM, 35 units PM    How often checking glucose at home? ANDRE   BG readings on regimen: ANDRE  Hypoglycemia on the regimen?  ANDRE  Missed doses on regimen?  ANDRE    Diabetes Complications include:     Hyperglycemia and Diabetic retinopathy     Complicating diabetes co morbidities:   History of MI and MURTAZA, CAD, HLD    HPI:   Patient is a 59 y.o. female with a diagnosis of HTN, HLN, DM type 2, nonproliferative diabetic renopathy, CAD, NSTEMI, and back pain who presents to the ED with a complaint of altered mental status on 04/20/2019. Is now s/p Exploratory laparotomy, Ligation of left ureter, and Removal of left JJ ureteral stent. Endocrinology consulted to manage DM2/Hyperglycemia.    Lab Results   Component Value Date    HGBA1C 10.8 (H) 02/19/2019       Interval HPI:   Overnight events:  BG is within or slightly above goal on current SQ insulin regimen.     Eating:   NPO  Nausea: No  Hypoglycemia and intervention: No  Fever: No  TPN and/or TF: Yes (TF).  If yes, type of TF/TPN and rate: 40 ml/hr    BP (!) 173/84   Pulse (!) 127   Temp 99.1 °F (37.3 °C)   Resp (!) 28   Ht 5' 5" (1.651 m)   Wt 83 kg (182 lb 15.7 oz)   SpO2 98%   Breastfeeding? No   BMI 30.45 kg/m²      Labs Reviewed and Include    Recent Labs   Lab 05/16/19  0328   *   CALCIUM 8.6*   ALBUMIN 1.5*   PROT 5.5*      K 4.3   CO2 24   *   BUN 37*   CREATININE 1.1   ALKPHOS 99   ALT <5*   AST 20   BILITOT 0.5     Lab Results   Component Value Date    WBC 13.91 (H) 05/16/2019    HGB 7.2 (L) 05/16/2019 "    HCT 22.7 (L) 05/16/2019    MCV 92 05/16/2019     05/16/2019     No results for input(s): TSH, FREET4 in the last 168 hours.  Lab Results   Component Value Date    HGBA1C 10.8 (H) 02/19/2019       Nutritional status:   Body mass index is 30.45 kg/m².  Lab Results   Component Value Date    ALBUMIN 1.5 (L) 05/16/2019    ALBUMIN 1.6 (L) 05/15/2019    ALBUMIN 1.6 (L) 05/14/2019     No results found for: PREALBUMIN    Estimated Creatinine Clearance: 59.3 mL/min (based on SCr of 1.1 mg/dL).    Accu-Checks  Recent Labs     05/15/19  0733 05/15/19  1219 05/15/19  1557 05/15/19  2007 05/16/19  0016 05/16/19  0341 05/16/19  0750 05/16/19  1225 05/16/19  1555 05/16/19  1952   POCTGLUCOSE 148* 189* 185* 181* 186* 213* 201* 173* 180* 208*       Current Medications and/or Treatments Impacting Glycemic Control  Immunotherapy:    Immunosuppressants     None        Steroids:   Hormones (From admission, onward)    None        Pressors:    Autonomic Drugs (From admission, onward)    None        Hyperglycemia/Diabetes Medications:   Antihyperglycemics (From admission, onward)    Start     Stop Route Frequency Ordered    05/11/19 1200  insulin aspart U-100 pen 3 Units      -- SubQ Every 24 hours (non-standard times) 05/10/19 1351    05/11/19 0800  insulin aspart U-100 pen 3 Units      -- SubQ Every 24 hours (non-standard times) 05/10/19 1351    05/11/19 0400  insulin aspart U-100 pen 3 Units      -- SubQ Every 24 hours (non-standard times) 05/10/19 1351    05/11/19 0000  insulin aspart U-100 pen 3 Units      -- SubQ Every 24 hours (non-standard times) 05/10/19 1351    05/10/19 2000  insulin aspart U-100 pen 3 Units      -- SubQ Every 24 hours (non-standard times) 05/10/19 1351    05/10/19 1600  insulin aspart U-100 pen 3 Units      -- SubQ Every 24 hours (non-standard times) 05/10/19 1351    05/10/19 1450  insulin aspart U-100 pen 0-5 Units      -- SubQ Every 4 hours PRN 05/10/19 1351          ASSESSMENT and PLAN    * Ureteral  transection of left native kidney  Managed per primary team  Avoid hypoglycemia        Type 2 diabetes mellitus with diabetic peripheral angiopathy without gangrene  BG goal 140 - 180    Novolog 3 units q 4 hrs (wieght based dosing using 0.5 modifier)  Hold if TFs are stopped.  Low dose correction scale   BG monitoring q 4 hrs.     ** Please notify Endocrine for any change and/or advance in diet/TF**  ** Please call Endocrine for any BG related issues **    Discharge Recommendations:     TBD.             CAD (coronary artery disease)  Managed per primary team  Condition may cause insulin resistance       HLD (hyperlipidemia)  Managed per primary team  Condition may cause insulin resistance         Sepsis  Infection may elevate BG readings    Managed per primary team  Avoid hypoglycemia        Sleep apnea  May affect BG readings if uncontrolled        On enteral nutrition  May cause BG excursions.           Uriah Holder, NP  Endocrinology  [facility]

## 2019-05-17 NOTE — PROGRESS NOTES
[facility]  Critical Care - Surgery  Progress Note    Patient Name: Mariann Huff  MRN: [id number]  Admission Date: 4/20/2019  Hospital Length of Stay: 27 days  Code Status: Full Code  Attending Provider: Robin Boyd MD  Primary Care Provider: Jasbir Haney MD   Principal Problem: Ureteral transection of left native kidney    Subjective:     Hospital/ICU Course:  The patient has had 2 episodes of bradycardia during the day.  Early in the morning, the patient had difficulty breathing and a mucus plug was found in the inner cannula of the tracheostomy.  She developed bradycardia but never arrested.  She also developed hypotension.  This was followed subsequently after she was bagged (Ambu) with tachypnea and hypertension and tachycardia.  Oxygen saturations were in the low 90s, and the arterial blood gas at that time revealed a significant alveolar arterial gradient with adequate ventilation.  Chest x-ray, which I personally reviewed, revealed what appeared to be a significant fluid overload.  We did perform an echocardiogram urgently, which I personally was present and reviewed the results.  There is no evidence of heart failure.  EKG was reported as normal and there is no evidence that the patient had an acute myocardial event.  The chest x-ray did suggest a significant amount of pulmonary edema. There was also a concern, because the the history of upper body deep venous thrombosis, that this could be a pulmonary embolism.  A CT scan of the chest , which I have personally reviewed show bilateral infiltrates compatible with pulmonary edema of non cardiogenic origin.  It was not possible to rule out a pulmonary embolism.  The patient is anticoagulated currently.  Ultrasound of the lower extremities was done and they do not show any deep venous thrombosis.    The patient did receive some fluids, because of her history of acute kidney injury, which has resolved and because she was receiving IV  contrast.  She has maintained a good urinary output during the day.  She has mostly being hemodynamically stable and her tachycardia resolved.  She remains sedated and on the ventilator.  Follow-up chest x-ray, which I personally reviewed, showed resolving pulmonary edema. Her oxygenation and oxygen saturations improved and we were able to wean the patient FiO2 down to 50%.    Of note, I did perform a bronchoscopy, which failed to reveal any amount of pus or mucus plugging.  There was also no evidence of aspiration.  BAL was performed and was sent.    A 2nd episode of bradycardia was observed in the afternoon.  This responded to the use of atropine and 0.1 mg of epinephrine.  She did have a tachycardic response with this which subsided rapidly.  She remained otherwise hemodynamically stable.  Arterial blood gases, reveal now a PO2 of 300 with adequate pCO2 and pH.  Mild hyperventilation.  She remains comfortable.  I have asked Cardiology to re-evaluate this patient.  For now she remains off pressors.    Neurologically the patient has been intact. She is awake alert and oriented with a nonfocal exam.    Her abdomen remains soft.  She has been NPO for now.  She has not had any further episodes of lower GI bleed.    The patient is having a good urinary output BUN and creatinine have been grossly within normal limits with a slight elevation of BUN.  Electrolytes are being followed and replaced as necessary.    Currently no evidence of infection in this patient.  Empirically I started antibiotics, but will stop them in the next 24-48 hours.    I have had the chance to talk to the patient's  at length and in detail.  I have explained our findings, I will keep him up-to-date as to any progress in understanding the etiology of these episodes and will wait continuing to talk to him as often as necessary.      Interval History/Significant Events: Continued rectal bleeding. Hgb fell. Received two units PRBC with partial  response. Holding off on packing.     Doing well on trach collar.    Afebrile.    Follow-up For: Procedure(s) (LRB):  SIGMOIDOSCOPY, FLEXIBLE (N/A)    Post-Operative Day: 4 Days Post-Op    Objective:     Vital Signs (Most Recent):  Temp: 99.1 °F (37.3 °C) (05/16/19 2300)  Pulse: 99 (05/17/19 0408)  Resp: 19 (05/17/19 0408)  BP: 133/64 (05/17/19 0100)  SpO2: 100 % (05/17/19 0408) Vital Signs (24h Range):  Temp:  [99 °F (37.2 °C)-99.8 °F (37.7 °C)] 99.1 °F (37.3 °C)  Pulse:  [] 99  Resp:  [19-35] 19  SpO2:  [92 %-100 %] 100 %  BP: (108-191)/(58-95) 133/64     Weight: (P) 76 kg (167 lb 8.8 oz)  Body mass index is 27.88 kg/m² (pended).      Intake/Output Summary (Last 24 hours) at 5/17/2019 0555  Last data filed at 5/17/2019 0500  Gross per 24 hour   Intake 2882 ml   Output 1600 ml   Net 1282 ml       Physical Exam   Constitutional: She appears well-developed and well-nourished.   HENT:   Head: Normocephalic and atraumatic.   Tracheostomy in place   Eyes: Right eye exhibits no discharge. Left eye exhibits no discharge.   Neck: Normal range of motion. Neck supple.   Cardiovascular: Normal rate and regular rhythm.   Pulmonary/Chest: Effort normal. No respiratory distress.   Abdominal: Soft.   Midline incision c/d/i.  PEG with no leak. Abdomen soft, non-distended.  Bowel sounds present   Genitourinary:   Genitourinary Comments: Nephrostomy tube in place with thin dark yellow output.  Fontenot removed  She still has significant BRBPR   Musculoskeletal: Normal range of motion.   Neurological: She is alert.   Able to follow commands, denying pain.    Skin: Skin is warm and dry.   Psychiatric: She has a normal mood and affect.   Nursing note and vitals reviewed.      Vents:  Trach collar    Lines/Drains/Airways     Central Venous Catheter Line                 Percutaneous Central Line Insertion/Assessment - triple lumen  05/12/19 1256  4 days         Trialysis (Dialysis) Catheter 05/12/19 1259 left internal jugular 4 days           Drain                 Nephrostomy 04/21/19 0629 Left 8 Fr. 25 days         Gastrostomy/Enterostomy 05/02/19 1134 Percutaneous endoscopic gastrostomy (PEG) LUQ decompression;feeding 14 days         Urethral Catheter 05/16/19 1040 16 Fr. less than 1 day          Airway                 Surgical Airway 05/02/19 1107 Shiley Cuffed 14 days          Peripheral Intravenous Line                 Midline Catheter Insertion/Assessment  - Single Lumen 05/07/19 1632 Left basilic vein (medial side of arm) 18g x 8cm 9 days                Significant Labs:    CBC/Anemia Profile:  Recent Labs   Lab 05/16/19  1200 05/16/19  2246 05/17/19  0214   WBC 13.91* 16.94* 15.05*   HGB 7.2* 8.2* 8.1*   HCT 22.7* 26.1* 25.3*    190 177   MCV 92 91 89   RDW 15.9* 14.9* 14.9*        Chemistries:  Recent Labs   Lab 05/16/19  0328 05/17/19  0410    146*   K 4.3 4.4   * 115*   CO2 24 23   BUN 37* 36*   CREATININE 1.1 0.8   CALCIUM 8.6* 8.4*   ALBUMIN 1.5* 1.5*   PROT 5.5* 5.1*   BILITOT 0.5 0.4   ALKPHOS 99 92   ALT <5* <5*   AST 20 23   MG 1.8 1.8   PHOS 2.8 2.9       All pertinent labs within the past 24 hours have been reviewed.    Significant Imaging:  I have reviewed all pertinent imaging results/findings within the past 24 hours.    Assessment/Plan:     * Ureteral transection of left native kidney  ASSESSMENT/PLAN:   Mariann Huff is a 59 y.o. female s/p left ureteral injury on 4/12, who presented with fever, AMS, and intraabdominal abscess, taken for washout and ligation of left ureter with nephrostomy tube placement on 4/21. S/p Trach/PEG on 5/2. Episode of negative pressure pulmonary edema on 5/8 requiring mechanical ventilation, diuresis and low dose pressor. Has been plagued by intermittent BRBPR.     Neuro:   - off sedation  - responds to questions appropriately by nodding  - no focal deficit     Pulmonary:   Moved from PAV+ to trach collar on 5/17 and tolerating well  - CXR pending  - ABGs  prn     Cardiac:   - HDS at this time  - did have some tachycardia associated with HTN yesterday  - TTE 5/8 with no evidence of right heart strain or significant valvular pathology, normal LV systolic function, EF 70%     Renal:    - S/p transection of left ureter 4/12 and subsequent ligation and PCT nephrostomy tube placement with IR 4/21  - Fontenot removed 5/15, but then replaced for retention   - fluconazole for candiduria  - making adequate urine  - Monitor I/Os       Intake/Output Summary (Last 24 hours) at 5/17/2019 0602  Last data filed at 5/17/2019 0500  Gross per 24 hour   Intake 2529 ml   Output 1600 ml   Net 929 ml        ID:   - afebrile  - Blood 5/10 NGTD  - Ucx 5/13 candiduria  - BAL 5/18 negative  - C diff 5/5 negative  - WBC trending down     Hem/Onc:   - H/H fell 2/2 BRBPR  - transfused 2U evening of 5/16  - check Hgb prn for bleeding     Endocrine:  - DM Type 2  - TF @ goal  - LDSSI  - Endocrine following for assistance with blood glucose control.      Fluids/Electrolytes/Nutrition/GI:   - Free water flushes 300 cc q6h  - Replace electrolytes PRN     # rectal ulcers  - continued intermittent hematochezia  - anoscopy and hemostatic agent applied by CRS 5/15     PPx:  - DVT: Lovenox, Hep gtt held for continued bloody BMs        DISPO:  - Continue SICU care  - Preparing for LTACH once bloody BMs are managed                Critical secondary to Patient has a condition that poses threat to life and bodily function: large volume lower GI bleed     Critical care was time spent personally by me on the following activities: development of treatment plan with patient or surrogate and bedside caregivers, discussions with consultants, evaluation of patient's response to treatment, examination of patient, ordering and performing treatments and interventions, ordering and review of laboratory studies, ordering and review of radiographic studies, pulse oximetry, re-evaluation of patient's condition.  This critical  care time did not overlap with that of any other provider or involve time for any procedures.     ISABELLE Carter MD  Critical Care - Surgery  [facility]

## 2019-05-17 NOTE — PLAN OF CARE
Problem: Fall Injury Risk  Goal: Absence of Fall and Fall-Related Injury  Outcome: Ongoing (interventions implemented as appropriate)  .      Problem: Diabetes Comorbidity  Goal: Blood Glucose Level Within Desired Range  Outcome: Ongoing (interventions implemented as appropriate)  .      Problem: Infection  Goal: Infection Symptom Resolution  Outcome: Ongoing (interventions implemented as appropriate)  .      Problem: Communication Impairment (Artificial Airway)  Goal: Effective Communication  Outcome: Ongoing (interventions implemented as appropriate)  .      Problem: Device-Related Complication Risk (Artificial Airway)  Goal: Optimal Device Function  Outcome: Ongoing (interventions implemented as appropriate)  .      Problem: Skin and Tissue Injury (Artificial Airway)  Goal: Absence of Device-Related Skin or Tissue Injury  Outcome: Ongoing (interventions implemented as appropriate)  .      Problem: Skin Injury Risk Increased  Goal: Skin Health and Integrity  Outcome: Ongoing (interventions implemented as appropriate)  .      Problem: Bleeding (Gastrointestinal Bleeding)  Goal: Hemostasis  Outcome: Ongoing (interventions implemented as appropriate)  .

## 2019-05-17 NOTE — SUBJECTIVE & OBJECTIVE
Interval History: No acute events. Afebrile overnight with VSS. Patient now on trach collar. Fontenot reinserted. Good urine output from nephrostomy and urethral catheter. Cr stable at 0.8. Patient continues to have rectal bleeding.     Review of Systems  Objective:     Temp:  [98.4 °F (36.9 °C)-99.7 °F (37.6 °C)] 98.4 °F (36.9 °C)  Pulse:  [] 93  Resp:  [7-35] 23  SpO2:  [91 %-100 %] 100 %  BP: (108-191)/(58-95) 161/74     Body mass index is 27.88 kg/m².    Date 05/17/19 0700 - 05/18/19 0659   Shift 9981-9313 0137-0848 7024-0699 24 Hour Total   INTAKE   NG/   160   Shift Total(mL/kg) 160(2.1)   160(2.1)   OUTPUT   Urine(mL/kg/hr) 180   180   Shift Total(mL/kg) 180(2.4)   180(2.4)   Weight (kg) 76 76 76 76     Bladder Scan Volume (mL): 27 mL (05/10/19 0846)  Post Void Cath Residual Output (mL): 27 mL (05/10/19 0846)    Drains     Drain                 Nephrostomy 04/21/19 0629 Left 8 Fr. 26 days         Gastrostomy/Enterostomy 05/02/19 1134 Percutaneous endoscopic gastrostomy (PEG) LUQ decompression;feeding 14 days         Trialysis (Dialysis) Catheter 05/12/19 1259 left internal jugular 4 days         Urethral Catheter 05/16/19 1040 16 Fr. 1 day                Physical Exam   Constitutional: She appears well-developed. No distress.   HENT:   Head: Normocephalic and atraumatic.   Neck: No JVD present.   Cardiovascular: Normal rate and regular rhythm.    Pulmonary/Chest: Effort normal. No respiratory distress.   On trach collar   Abdominal: Soft. She exhibits no distension. There is no rebound and no guarding.   Incision c/d/i   G-tube   Genitourinary:   Genitourinary Comments: Fontenot in place draining clear yellow urine  Left neph tube with clear yellow urine   Neurological: She is alert.   Skin: Skin is warm and dry. She is not diaphoretic. No pallor.         Significant Labs:    BMP:  Recent Labs   Lab 05/15/19  0300 05/16/19  0328 05/17/19  0410    143 146*   K 4.3 4.3 4.4   * 112* 115*   CO2  23 24 23   BUN 33* 37* 36*   CREATININE 1.1 1.1 0.8   CALCIUM 8.7 8.6* 8.4*       CBC:   Recent Labs   Lab 05/16/19  2246 05/17/19  0214 05/17/19  0622   WBC 16.94* 15.05* 12.97*   HGB 8.2* 8.1* 7.6*   HCT 26.1* 25.3* 23.9*    177 197     Significant Imaging:  All pertinent imaging results/findings from the past 24 hours have been reviewed.

## 2019-05-17 NOTE — PROGRESS NOTES
[facility]  Urology  Progress Note    Patient Name: Mariann Huff  MRN: [id number]  Admission Date: 4/20/2019  Hospital Length of Stay: 27 days  Code Status: Full Code   Attending Provider: Robin Boyd MD   Primary Care Physician: Jasbir Haney MD    Subjective:     HPI:  Mariann Huff is a 59 y.o. female with history of HTN, type 2 diabetes mellitus, CAD, NSTEMI, and back pain who presents to [facility] with altered mental status and sepsis. She underwent L5-S1 OLIF with NSGY on 4/12 and had intraoperative left ureteral injury with ureteroureteral anastomosis and ureteral stent placement. She did well initially and had correa and MADHURI drain removed on 4/16. She began having chills and altered mental status 2 days ago and this has progressively worsened. No complaints of pain.     She is altered and HPI is limited. In the ED she is febrile to 103 and tachycardic and pressures are low normal. WBC is 5, creatinine is 3.6 baseline 1.0, lactate is 4.6. Cath UA concerning for infection, 3+ LE, >100 WBCs, and many bacteria on microscopy.    CT and MRI abdomen both show air with minimal fluid near the surgical site with left ureter coursing through. There is air throughout the proximal collecting system which is decompressed with JJ ureteral stent in good position.    Taken for ex lap, ligation of left ureter and left neph tube placement on 4/21/19.    Interval History: No acute events. Afebrile overnight with VSS. Patient now on trach collar. Correa reinserted. Good urine output from nephrostomy and urethral catheter. Cr stable at 0.8. Patient continues to have rectal bleeding.     Review of Systems  Objective:     Temp:  [98.4 °F (36.9 °C)-99.7 °F (37.6 °C)] 98.4 °F (36.9 °C)  Pulse:  [] 93  Resp:  [7-35] 23  SpO2:  [91 %-100 %] 100 %  BP: (108-191)/(58-95) 161/74     Body mass index is 27.88 kg/m².    Date 05/17/19 0700 - 05/18/19 0659   Shift 3234-3678 1435-3248 4724-0717 24 Hour Total   INTAKE   NG/GT  160   160   Shift Total(mL/kg) 160(2.1)   160(2.1)   OUTPUT   Urine(mL/kg/hr) 180   180   Shift Total(mL/kg) 180(2.4)   180(2.4)   Weight (kg) 76 76 76 76     Bladder Scan Volume (mL): 27 mL (05/10/19 0846)  Post Void Cath Residual Output (mL): 27 mL (05/10/19 0846)    Drains     Drain                 Nephrostomy 04/21/19 0629 Left 8 Fr. 26 days         Gastrostomy/Enterostomy 05/02/19 1134 Percutaneous endoscopic gastrostomy (PEG) LUQ decompression;feeding 14 days         Trialysis (Dialysis) Catheter 05/12/19 1259 left internal jugular 4 days         Urethral Catheter 05/16/19 1040 16 Fr. 1 day                Physical Exam   Constitutional: She appears well-developed. No distress.   HENT:   Head: Normocephalic and atraumatic.   Neck: No JVD present.   Cardiovascular: Normal rate and regular rhythm.    Pulmonary/Chest: Effort normal. No respiratory distress.   On trach collar   Abdominal: Soft. She exhibits no distension. There is no rebound and no guarding.   Incision c/d/i   G-tube   Genitourinary:   Genitourinary Comments: Fontenot in place draining clear yellow urine  Left neph tube with clear yellow urine   Neurological: She is alert.   Skin: Skin is warm and dry. She is not diaphoretic. No pallor.         Significant Labs:    BMP:  Recent Labs   Lab 05/15/19  0300 05/16/19  0328 05/17/19  0410    143 146*   K 4.3 4.3 4.4   * 112* 115*   CO2 23 24 23   BUN 33* 37* 36*   CREATININE 1.1 1.1 0.8   CALCIUM 8.7 8.6* 8.4*       CBC:   Recent Labs   Lab 05/16/19  2246 05/17/19  0214 05/17/19  0622   WBC 16.94* 15.05* 12.97*   HGB 8.2* 8.1* 7.6*   HCT 26.1* 25.3* 23.9*    177 197     Significant Imaging:  All pertinent imaging results/findings from the past 24 hours have been reviewed.                  Assessment/Plan:     * Ureteral transection of left native kidney  Mariann Huff is a 59 y.o. female s/p left ureteral injury on 4/12, presented with fever, AMS, and intraabdominal abscess, taken for  washout and ligation of left ureter with neph tube placement on 4/21, Trach/PEG 5/2.  - patient now on trach collar  - Tube feeds per SICU  - Ancef, Rifampin per ID recs   - 5/13 UCx growing Candida albicans; started course of Diflucan  - Maintain neph tube and Fontenot  - Urine output adequate, Cr stable at 0.8  - diuresis/fluids per SICU  - continues to have bloody BM, Colorectal on board, appreciate recs; H&H currently    Dispo: continue ICU care; plan for transfer to LTAC once bloody BM resolve    Sepsis  As above        VTE Risk Mitigation (From admission, onward)        Ordered     IP VTE LOW RISK PATIENT  Once      05/08/19 1315     Place sequential compression device  Until discontinued      04/20/19 3291          Mook Ferguson MD  Urology  [facility]

## 2019-05-17 NOTE — ASSESSMENT & PLAN NOTE
ASSESSMENT/PLAN:   Mariann Huff is a 59 y.o. female s/p left ureteral injury on 4/12, who presented with fever, AMS, and intraabdominal abscess, taken for washout and ligation of left ureter with nephrostomy tube placement on 4/21. S/p Trach/PEG on 5/2. Episode of negative pressure pulmonary edema on 5/8 requiring mechanical ventilation, diuresis and low dose pressor. Has been plagued by intermittent BRBPR.     Neuro:   - off sedation  - responds to questions appropriately by nodding  - no focal deficit     Pulmonary:   Moved from PAV+ to trach collar on 5/17; back to PAV+ 5/19  - CXR bilateral haziness  - ABGs prn     Cardiac:   - HDS at this time  - did again have some tachycardia associated with HTN that seems related to respiratory distress  - TTE 5/8 with no evidence of right heart strain or significant valvular pathology, normal LV systolic function, EF 70%     Renal:    - S/p transection of left ureter 4/12 and subsequent ligation and PCT nephrostomy tube placement with IR 4/21  - Fontenot removed 5/15, but then replaced for retention   - fluconazole for candiduria  - lasix 40 this AM  - hold on additional lasix in setting of PAPA  - Monitor I/Os       Intake/Output Summary (Last 24 hours) at 5/17/2019 1016  Last data filed at 5/17/2019 0900  Gross per 24 hour   Intake 2129 ml   Output 1760 ml   Net 369 ml        ID:   - mild fevers  - increase in WBC  - Blood 5/10 NGTD  - Ucx 5/13 candiduria  - BAL 5/18 negative  - C diff 5/5 negative  - call ID for abx recs     Hem/Onc:   - H/H fell   - getting 2U PRBC this AM  - check Hgb prn for bleeding     Endocrine:  - DM Type 2  - TF @ goal  - LDSSI  - Endocrine following for assistance with blood glucose control.      Fluids/Electrolytes/Nutrition/GI:   - Free water flushes 300 cc q6h  - Replace electrolytes PRN     # rectal ulcers  - intermittent hematochezia seems to be improving  - anoscopy and hemostatic agent applied by CRS 5/15  - imodium QID     PPx:  - DVT:  Lovenox, Hep gtt held for continued bloody BMs        DISPO:  - Continue SICU care  - Preparing for LTACH once bloody BMs are managed

## 2019-05-17 NOTE — PLAN OF CARE
"Problem: Adult Inpatient Plan of Care  Goal: Plan of Care Review  Outcome: Ongoing (interventions implemented as appropriate)  Dx: Ureteral transection of left native kidney    Shift Events: VSS throughout shift. PRN hydralazine and labetalol given for elevated blood pressure. PRN dilaudid given to maintain adequate pain control. 4 BMs during shift. Small-moderate BMs with no blood noted during shift. Trach collar throughout shift 40% 10 L. Blood sugars checked q 4 hours. PRN and scheduled insulin given to for blood sugars within range. Central line removed per MD order. No issues post removal. Plan of care reviewed with patient and spouse. Questions and concerns addressed. Will continue to monitor.     Neuro: Arouses to Voice and Follows Commands  Moves all extremities     Vital Signs: /63 (BP Location: Right arm, Patient Position: Lying)   Pulse 91   Temp 99.4 °F (37.4 °C) (Oral)   Resp (!) 31   Ht 5' 5" (1.651 m)   Wt 76 kg (167 lb 8.8 oz)   SpO2 100%   Breastfeeding? No   BMI 27.88 kg/m²     Diet: NPO and Tube Feeds    Gtts: None    Urine Output: Urinary Catheter 375 cc/shift   Nephrostomy Tube 250 cc/shift      Labs/Accuchecks: Accuchecks q 4 hours CBC q 12 hrs. Daily labs.    Skin: Bilateral lower back wounds cleansed with normal saline, medi-honey applied, and foam dressings changed.  Miconazole ointment applied to buttocks, perirectal region, and groin to prevent further breakdown. Patient turned q 2 to prevent further skin breakdown.         "

## 2019-05-17 NOTE — PROGRESS NOTES
[facility]  Colorectal Surgery  Progress Note    Patient Name: Mariann Huff  MRN: [id number]  Admission Date: 4/20/2019  Hospital Length of Stay: 27 days  Attending Physician: Robin Boyd MD    Subjective:     Interval History: still having int bloody diarrhea, better overnight and stool slightly more thick    Post-Op Info:  Procedure(s) (LRB):  SIGMOIDOSCOPY, FLEXIBLE (N/A)   4 Days Post-Op      Medications:  Continuous Infusions:  Scheduled Meds:   carvedilol  3.125 mg Per G Tube BID    ceFAZolin (ANCEF) IVPB  2 g Intravenous Q8H    chlorhexidine  15 mL Mouth/Throat BID    fluconazole 40 mg/ml  200 mg Per G Tube Daily    insulin aspart U-100  3 Units Subcutaneous Q24H    insulin aspart U-100  3 Units Subcutaneous Q24H    insulin aspart U-100  3 Units Subcutaneous Q24H    insulin aspart U-100  3 Units Subcutaneous Q24H    insulin aspart U-100  3 Units Subcutaneous Q24H    insulin aspart U-100  3 Units Subcutaneous Q24H    loperamide  2 mg Per G Tube QID    miconazole nitrate 2%   Topical (Top) BID    [START ON 5/18/2019] pantoprazole  40 mg Per G Tube Daily    rifAMpin (RIFADIN) IVPB  300 mg Intravenous Q12H    silodosin  4 mg Per G Tube Daily     PRN Meds:   acetaminophen    albuterol-ipratropium    dextrose 50%    glucagon (human recombinant)    hydrALAZINE    HYDROmorphone    hydrOXYzine    insulin aspart U-100    labetalol    magnesium sulfate IVPB    magnesium sulfate IVPB    potassium chloride in water    promethazine (PHENERGAN) IVPB    sodium chloride 0.9%        Objective:     Vital Signs (Most Recent):  Temp: 98.4 °F (36.9 °C) (05/17/19 0700)  Pulse: 91 (05/17/19 0930)  Resp: (!) 29 (05/17/19 0930)  BP: 123/63 (05/17/19 0930)  SpO2: 99 % (05/17/19 0930) Vital Signs (24h Range):  Temp:  [98.4 °F (36.9 °C)-99.7 °F (37.6 °C)] 98.4 °F (36.9 °C)  Pulse:  [] 91  Resp:  [7-35] 29  SpO2:  [91 %-100 %] 99 %  BP: (108-191)/(58-95) 123/63     Intake/Output -  Last 3 Shifts       05/15 0700 - 05/16 0659 05/16 0700 - 05/17 0659 05/17 0700 - 05/18 0659    I.V. (mL/kg) 313 (3.8) 199 (2.6)     Blood  480     NG/GT 1000 1890 120    IV Piggyback  100     Total Intake(mL/kg) 1313 (15.8) 2669 (35.1) 120 (1.6)    Urine (mL/kg/hr) 890 (0.4) 1735 (1) 150 (0.6)    Drains 20      Stool 0 0     Total Output 910 1735 150    Net +403 +934 -30           Stool Occurrence 4 x 2 x           Physical Exam   Constitutional: No distress.   Eyes: EOM are normal.   Neck: Neck supple.   Cardiovascular: Normal rate and regular rhythm.   Pulmonary/Chest: Effort normal. No respiratory distress.   Abdominal: Soft. She exhibits no distension. There is no tenderness.   Neurological: She is alert.   Skin: Skin is warm and dry.     Significant Labs:  BMP (Last 3 Results):   Recent Labs   Lab 05/15/19  0300 05/16/19  0328 05/17/19  0410   * 189* 179*    143 146*   K 4.3 4.3 4.4   * 112* 115*   CO2 23 24 23   BUN 33* 37* 36*   CREATININE 1.1 1.1 0.8   CALCIUM 8.7 8.6* 8.4*   MG 1.9 1.8 1.8     CBC (Last 3 Results):   Recent Labs   Lab 05/16/19  2246 05/17/19  0214 05/17/19  0622   WBC 16.94* 15.05* 12.97*   RBC 2.86* 2.84* 2.67*   HGB 8.2* 8.1* 7.6*   HCT 26.1* 25.3* 23.9*    177 197   MCV 91 89 90   MCH 28.7 28.5 28.5   MCHC 31.4* 32.0 31.8*       Significant Diagnostics:  I have reviewed all pertinent imaging results/findings within the past 24 hours.    Assessment/Plan:     BRBPR (bright red blood per rectum)  Ms. Huff is a 60 yo F with PMH of HTN, DM II, CAD, NSTEMI, s/p L5-S1 OLIF with NSGY 4/12 c/b intraoperative left ureteral injury and underwent ureteroureteral anastomoses and ureteral stent placement complicated by sepsis due to E.coli septicemia now s/p ex lap on 4/21 and PEG/Trach of the vent now having BRBPR.     S/p flex sig on 5/13  Placed hemablast in rectum on 5/15  Still having oozing but HDS and H/H stable, cont to trend  Imodium QID to help with diarrhea, can  increase tomorrow if needed  Transfuse as needed  cont to hold hep gtt  Please call with any questions or concerns               John Barker MD  Colorectal Surgery  [facility]

## 2019-05-17 NOTE — ASSESSMENT & PLAN NOTE
Ms. Huff is a 58 yo F with PMH of HTN, DM II, CAD, NSTEMI, s/p L5-S1 OLIF with NSGY 4/12 c/b intraoperative left ureteral injury and underwent ureteroureteral anastomoses and ureteral stent placement complicated by sepsis due to E.coli septicemia now s/p ex lap on 4/21 and PEG/Trach of the vent now having BRBPR.     S/p flex sig on 5/13  Placed hemablast in rectum on 5/15  Still having oozing but HDS and H/H stable, cont to trend  Imodium QID to help with diarrhea, can increase tomorrow if needed  Transfuse as needed  cont to hold hep gtt  Please call with any questions or concerns

## 2019-05-17 NOTE — CARE UPDATE
BG goal 140 - 180     Increase Novolog to 4 units q 4 hrs (wieght based dosing using 0.5 modifier)  Hold if TFs are stopped.  Low dose correction scale   BG monitoring q 4 hrs.      ** Please notify Endocrine for any change and/or advance in diet/TF**  ** Please call Endocrine for any BG related issues **     Discharge Recommendations:     TBD.

## 2019-05-18 LAB
ALBUMIN SERPL BCP-MCNC: 1.5 G/DL (ref 3.5–5.2)
ALP SERPL-CCNC: 96 U/L (ref 55–135)
ALT SERPL W/O P-5'-P-CCNC: <5 U/L (ref 10–44)
ANION GAP SERPL CALC-SCNC: 8 MMOL/L (ref 8–16)
AST SERPL-CCNC: 19 U/L (ref 10–40)
BASOPHILS # BLD AUTO: 0.08 K/UL (ref 0–0.2)
BASOPHILS NFR BLD: 0.6 % (ref 0–1.9)
BILIRUB SERPL-MCNC: 0.4 MG/DL (ref 0.1–1)
BUN SERPL-MCNC: 35 MG/DL (ref 6–20)
CALCIUM SERPL-MCNC: 8.7 MG/DL (ref 8.7–10.5)
CHLORIDE SERPL-SCNC: 116 MMOL/L (ref 95–110)
CO2 SERPL-SCNC: 24 MMOL/L (ref 23–29)
CREAT SERPL-MCNC: 0.9 MG/DL (ref 0.5–1.4)
DIFFERENTIAL METHOD: ABNORMAL
EOSINOPHIL # BLD AUTO: 0.5 K/UL (ref 0–0.5)
EOSINOPHIL NFR BLD: 3.6 % (ref 0–8)
ERYTHROCYTE [DISTWIDTH] IN BLOOD BY AUTOMATED COUNT: 15.2 % (ref 11.5–14.5)
EST. GFR  (AFRICAN AMERICAN): >60 ML/MIN/1.73 M^2
EST. GFR  (NON AFRICAN AMERICAN): >60 ML/MIN/1.73 M^2
GLUCOSE SERPL-MCNC: 174 MG/DL (ref 70–110)
HCT VFR BLD AUTO: 23.3 % (ref 37–48.5)
HGB BLD-MCNC: 7.5 G/DL (ref 12–16)
IMM GRANULOCYTES # BLD AUTO: 0.1 K/UL (ref 0–0.04)
IMM GRANULOCYTES NFR BLD AUTO: 0.7 % (ref 0–0.5)
INR PPP: 1 (ref 0.8–1.2)
LYMPHOCYTES # BLD AUTO: 2.3 K/UL (ref 1–4.8)
LYMPHOCYTES NFR BLD: 16.3 % (ref 18–48)
MAGNESIUM SERPL-MCNC: 1.7 MG/DL (ref 1.6–2.6)
MCH RBC QN AUTO: 28.5 PG (ref 27–31)
MCHC RBC AUTO-ENTMCNC: 32.2 G/DL (ref 32–36)
MCV RBC AUTO: 89 FL (ref 82–98)
MONOCYTES # BLD AUTO: 1.5 K/UL (ref 0.3–1)
MONOCYTES NFR BLD: 10.5 % (ref 4–15)
NEUTROPHILS # BLD AUTO: 9.8 K/UL (ref 1.8–7.7)
NEUTROPHILS NFR BLD: 68.3 % (ref 38–73)
NRBC BLD-RTO: 0 /100 WBC
PHOSPHATE SERPL-MCNC: 3 MG/DL (ref 2.7–4.5)
PLATELET # BLD AUTO: 210 K/UL (ref 150–350)
PMV BLD AUTO: 10.9 FL (ref 9.2–12.9)
POCT GLUCOSE: 164 MG/DL (ref 70–110)
POCT GLUCOSE: 185 MG/DL (ref 70–110)
POCT GLUCOSE: 202 MG/DL (ref 70–110)
POCT GLUCOSE: 203 MG/DL (ref 70–110)
POCT GLUCOSE: 215 MG/DL (ref 70–110)
POCT GLUCOSE: 294 MG/DL (ref 70–110)
POTASSIUM SERPL-SCNC: 4.3 MMOL/L (ref 3.5–5.1)
PROT SERPL-MCNC: 5.4 G/DL (ref 6–8.4)
PROTHROMBIN TIME: 10.7 SEC (ref 9–12.5)
RBC # BLD AUTO: 2.63 M/UL (ref 4–5.4)
SODIUM SERPL-SCNC: 148 MMOL/L (ref 136–145)
WBC # BLD AUTO: 14.37 K/UL (ref 3.9–12.7)

## 2019-05-18 PROCEDURE — 63600175 PHARM REV CODE 636 W HCPCS: Performed by: STUDENT IN AN ORGANIZED HEALTH CARE EDUCATION/TRAINING PROGRAM

## 2019-05-18 PROCEDURE — 25000003 PHARM REV CODE 250: Performed by: STUDENT IN AN ORGANIZED HEALTH CARE EDUCATION/TRAINING PROGRAM

## 2019-05-18 PROCEDURE — 25000242 PHARM REV CODE 250 ALT 637 W/ HCPCS: Performed by: STUDENT IN AN ORGANIZED HEALTH CARE EDUCATION/TRAINING PROGRAM

## 2019-05-18 PROCEDURE — 99900026 HC AIRWAY MAINTENANCE (STAT)

## 2019-05-18 PROCEDURE — 80053 COMPREHEN METABOLIC PANEL: CPT

## 2019-05-18 PROCEDURE — 84100 ASSAY OF PHOSPHORUS: CPT

## 2019-05-18 PROCEDURE — 99900035 HC TECH TIME PER 15 MIN (STAT)

## 2019-05-18 PROCEDURE — 99232 PR SUBSEQUENT HOSPITAL CARE,LEVL II: ICD-10-PCS | Mod: ,,, | Performed by: SURGERY

## 2019-05-18 PROCEDURE — 31720 CLEARANCE OF AIRWAYS: CPT

## 2019-05-18 PROCEDURE — 27100171 HC OXYGEN HIGH FLOW UP TO 24 HOURS

## 2019-05-18 PROCEDURE — 94003 VENT MGMT INPAT SUBQ DAY: CPT

## 2019-05-18 PROCEDURE — 99232 SBSQ HOSP IP/OBS MODERATE 35: CPT | Mod: ,,, | Performed by: SURGERY

## 2019-05-18 PROCEDURE — 94761 N-INVAS EAR/PLS OXIMETRY MLT: CPT

## 2019-05-18 PROCEDURE — 27000221 HC OXYGEN, UP TO 24 HOURS

## 2019-05-18 PROCEDURE — 85025 COMPLETE CBC W/AUTO DIFF WBC: CPT

## 2019-05-18 PROCEDURE — 85610 PROTHROMBIN TIME: CPT

## 2019-05-18 PROCEDURE — 94640 AIRWAY INHALATION TREATMENT: CPT

## 2019-05-18 PROCEDURE — 20000000 HC ICU ROOM

## 2019-05-18 PROCEDURE — 83735 ASSAY OF MAGNESIUM: CPT

## 2019-05-18 PROCEDURE — 27100092 HC HIGH FLOW DELIVERY CANNULA

## 2019-05-18 RX ORDER — ONDANSETRON 2 MG/ML
4 INJECTION INTRAMUSCULAR; INTRAVENOUS EVERY 8 HOURS PRN
Status: DISCONTINUED | OUTPATIENT
Start: 2019-05-18 | End: 2019-06-05 | Stop reason: HOSPADM

## 2019-05-18 RX ORDER — FUROSEMIDE 10 MG/ML
20 INJECTION INTRAMUSCULAR; INTRAVENOUS ONCE
Status: COMPLETED | OUTPATIENT
Start: 2019-05-18 | End: 2019-05-18

## 2019-05-18 RX ADMIN — Medication 2 MG: at 04:05

## 2019-05-18 RX ADMIN — MICONAZOLE NITRATE: 20 OINTMENT TOPICAL at 08:05

## 2019-05-18 RX ADMIN — MICONAZOLE NITRATE: 20 OINTMENT TOPICAL at 09:05

## 2019-05-18 RX ADMIN — SILODOSIN 4 MG: 4 CAPSULE ORAL at 08:05

## 2019-05-18 RX ADMIN — HYDROXYZINE HYDROCHLORIDE 10 MG: 10 SOLUTION ORAL at 05:05

## 2019-05-18 RX ADMIN — Medication 2 MG: at 12:05

## 2019-05-18 RX ADMIN — FUROSEMIDE 20 MG: 10 INJECTION, SOLUTION INTRAMUSCULAR; INTRAVENOUS at 07:05

## 2019-05-18 RX ADMIN — ACETAMINOPHEN 650 MG: 325 TABLET ORAL at 11:05

## 2019-05-18 RX ADMIN — FLUCONAZOLE 200 MG: 40 POWDER, FOR SUSPENSION ORAL at 08:05

## 2019-05-18 RX ADMIN — INSULIN ASPART 2 UNITS: 100 INJECTION, SOLUTION INTRAVENOUS; SUBCUTANEOUS at 04:05

## 2019-05-18 RX ADMIN — Medication 2 MG: at 08:05

## 2019-05-18 RX ADMIN — INSULIN ASPART 1 UNITS: 100 INJECTION, SOLUTION INTRAVENOUS; SUBCUTANEOUS at 12:05

## 2019-05-18 RX ADMIN — MAGNESIUM SULFATE IN WATER 2 G: 40 INJECTION, SOLUTION INTRAVENOUS at 06:05

## 2019-05-18 RX ADMIN — RIFAMPIN 300 MG: 600 INJECTION, POWDER, LYOPHILIZED, FOR SOLUTION INTRAVENOUS at 04:05

## 2019-05-18 RX ADMIN — CEFAZOLIN 2 G: 1 INJECTION, POWDER, FOR SOLUTION INTRAMUSCULAR; INTRAVENOUS at 09:05

## 2019-05-18 RX ADMIN — CEFAZOLIN 2 G: 1 INJECTION, POWDER, FOR SOLUTION INTRAMUSCULAR; INTRAVENOUS at 06:05

## 2019-05-18 RX ADMIN — IPRATROPIUM BROMIDE AND ALBUTEROL SULFATE 3 ML: .5; 3 SOLUTION RESPIRATORY (INHALATION) at 06:05

## 2019-05-18 RX ADMIN — HYDROMORPHONE HYDROCHLORIDE 1 MG: 1 INJECTION, SOLUTION INTRAMUSCULAR; INTRAVENOUS; SUBCUTANEOUS at 06:05

## 2019-05-18 RX ADMIN — INSULIN ASPART 3 UNITS: 100 INJECTION, SOLUTION INTRAVENOUS; SUBCUTANEOUS at 07:05

## 2019-05-18 RX ADMIN — HYDROMORPHONE HYDROCHLORIDE 1 MG: 1 INJECTION, SOLUTION INTRAMUSCULAR; INTRAVENOUS; SUBCUTANEOUS at 12:05

## 2019-05-18 RX ADMIN — ACETAMINOPHEN 650 MG: 325 TABLET ORAL at 02:05

## 2019-05-18 RX ADMIN — INSULIN ASPART 4 UNITS: 100 INJECTION, SOLUTION INTRAVENOUS; SUBCUTANEOUS at 08:05

## 2019-05-18 RX ADMIN — Medication 2 MG: at 09:05

## 2019-05-18 RX ADMIN — ONDANSETRON 4 MG: 2 INJECTION INTRAMUSCULAR; INTRAVENOUS at 03:05

## 2019-05-18 RX ADMIN — INSULIN ASPART 4 UNITS: 100 INJECTION, SOLUTION INTRAVENOUS; SUBCUTANEOUS at 07:05

## 2019-05-18 RX ADMIN — RIFAMPIN 300 MG: 600 INJECTION, POWDER, LYOPHILIZED, FOR SOLUTION INTRAVENOUS at 02:05

## 2019-05-18 RX ADMIN — INSULIN ASPART 4 UNITS: 100 INJECTION, SOLUTION INTRAVENOUS; SUBCUTANEOUS at 12:05

## 2019-05-18 RX ADMIN — INSULIN ASPART 4 UNITS: 100 INJECTION, SOLUTION INTRAVENOUS; SUBCUTANEOUS at 03:05

## 2019-05-18 RX ADMIN — INSULIN ASPART 4 UNITS: 100 INJECTION, SOLUTION INTRAVENOUS; SUBCUTANEOUS at 04:05

## 2019-05-18 RX ADMIN — PANTOPRAZOLE SODIUM 40 MG: 40 GRANULE, DELAYED RELEASE ORAL at 09:05

## 2019-05-18 RX ADMIN — CHLORHEXIDINE GLUCONATE 0.12% ORAL RINSE 15 ML: 1.2 LIQUID ORAL at 09:05

## 2019-05-18 RX ADMIN — CARVEDILOL 3.12 MG: 3.12 TABLET, FILM COATED ORAL at 09:05

## 2019-05-18 RX ADMIN — CHLORHEXIDINE GLUCONATE 0.12% ORAL RINSE 15 ML: 1.2 LIQUID ORAL at 08:05

## 2019-05-18 RX ADMIN — CEFAZOLIN 2 G: 1 INJECTION, POWDER, FOR SOLUTION INTRAMUSCULAR; INTRAVENOUS at 03:05

## 2019-05-18 RX ADMIN — CARVEDILOL 3.12 MG: 3.12 TABLET, FILM COATED ORAL at 08:05

## 2019-05-18 NOTE — PROGRESS NOTES
At 1750. Patient began exhibiting signs of distress. After NT suctioning and spot checking the aerosol equipment, patient's saturations began to drop. Patient was placed back on Ventilator on PAV+ with recorded settings. Around 1800, the % of support was raised to 60% after patient continually showed labored breathing. A PRN breathing treatment was then given to help with audible inspiratory and expiratory wheezes. The cuff pressure of the trach was checked, positioning of trach was altered several times in order to cut out the air escaping around the cuff that caused the wheezing. The circuit was checked and no problems found. I minimized the leak as best as possible and ensured that the O2 saturation was adequate for the patient's needs. Patient may need to go back on a rate. Will continue to monitor.

## 2019-05-18 NOTE — NURSING
MD updated on patient's status. Orders given to administer pain medication because patient complained of pain. O2 saturations still in the low 90s on SPONT ventilation. Patient resting comfortably with noticeably less anxiety. Will continue to monitor.

## 2019-05-18 NOTE — ASSESSMENT & PLAN NOTE
Ms. Huff is a 58 yo F with PMH of HTN, DM II, CAD, NSTEMI, s/p L5-S1 OLIF with NSGY 4/12 c/b intraoperative left ureteral injury and underwent ureteroureteral anastomoses and ureteral stent placement complicated by sepsis due to E.coli septicemia now s/p ex lap on 4/21 and PEG/Trach of the vent now having BRBPR.     S/p flex sig on 5/13  Placed hemablast in rectum on 5/15  Imodium QID to help with diarrhea  Transfuse as needed, cont to trend H/H  cont to hold hep gtt  Please call with any questions or concerns

## 2019-05-18 NOTE — PROGRESS NOTES
[facility]  Colorectal Surgery  Progress Note    Patient Name: Mariann Huff  MRN: [id number]  Admission Date: 4/20/2019  Hospital Length of Stay: 28 days  Attending Physician: Robin Boyd MD    Subjective:     Interval History:  No bloody BMs overnight    Post-Op Info:  Procedure(s) (LRB):  SIGMOIDOSCOPY, FLEXIBLE (N/A)   5 Days Post-Op      Medications:  Continuous Infusions:  Scheduled Meds:   carvedilol  3.125 mg Per G Tube BID    ceFAZolin (ANCEF) IVPB  2 g Intravenous Q8H    chlorhexidine  15 mL Mouth/Throat BID    fluconazole 40 mg/ml  200 mg Per G Tube Daily    insulin aspart U-100  4 Units Subcutaneous Q24H    insulin aspart U-100  4 Units Subcutaneous Q24H    insulin aspart U-100  4 Units Subcutaneous Q24H    insulin aspart U-100  4 Units Subcutaneous Q24H    insulin aspart U-100  4 Units Subcutaneous Q24H    insulin aspart U-100  4 Units Subcutaneous Q24H    loperamide  2 mg Per G Tube QID    miconazole nitrate 2%   Topical (Top) BID    pantoprazole  40 mg Per G Tube Daily    rifAMpin (RIFADIN) IVPB  300 mg Intravenous Q12H    silodosin  4 mg Per G Tube Daily     PRN Meds:   acetaminophen    albuterol-ipratropium    dextrose 50%    glucagon (human recombinant)    hydrALAZINE    HYDROmorphone    hydrOXYzine    insulin aspart U-100    labetalol    magnesium sulfate IVPB    magnesium sulfate IVPB    potassium chloride in water    promethazine (PHENERGAN) IVPB    sodium chloride 0.9%        Objective:     Vital Signs (Most Recent):  Temp: 99.5 °F (37.5 °C) (05/18/19 1100)  Pulse: 94 (05/18/19 1130)  Resp: 19 (05/18/19 1130)  BP: (!) 144/63 (05/18/19 1100)  SpO2: 99 % (05/18/19 1130) Vital Signs (24h Range):  Temp:  [98.9 °F (37.2 °C)-100.5 °F (38.1 °C)] 99.5 °F (37.5 °C)  Pulse:  [] 94  Resp:  [0-38] 19  SpO2:  [95 %-100 %] 99 %  BP: ()/(42-86) 144/63     Intake/Output - Last 3 Shifts       05/16 0700 - 05/17 0659 05/17 0700 - 05/18 0659 05/18  0700 - 05/19 0659    I.V. (mL/kg) 199 (2.6) 195 (2.6)     Blood 480      NG/GT 1890 1965 450    IV Piggyback 100      Total Intake(mL/kg) 2669 (35.1) 2160 (28.4) 450 (5.9)    Urine (mL/kg/hr) 1735 (1) 1655 (0.9) 400 (1.1)    Drains       Stool 0 0     Total Output 1735 1655 400    Net +934 +505 +50           Stool Occurrence 2 x 2 x           Physical Exam   Constitutional: No distress.   Eyes: EOM are normal.   Neck: Neck supple.   Cardiovascular: Normal rate and regular rhythm.   Pulmonary/Chest: Effort normal. No respiratory distress.   Abdominal: Soft. She exhibits no distension. There is no tenderness.   Musculoskeletal: Normal range of motion.   Neurological: She is alert.   Skin: Skin is warm and dry.   Significant Labs:  BMP (Last 3 Results):   Recent Labs   Lab 05/16/19  0328 05/17/19  0410 05/18/19  0256   * 179* 174*    146* 148*   K 4.3 4.4 4.3   * 115* 116*   CO2 24 23 24   BUN 37* 36* 35*   CREATININE 1.1 0.8 0.9   CALCIUM 8.6* 8.4* 8.7   MG 1.8 1.8 1.7     CBC (Last 3 Results):   Recent Labs   Lab 05/17/19  1134 05/17/19  1620 05/18/19  0256   WBC 13.63* 14.82* 14.37*   RBC 2.55* 2.67* 2.63*   HGB 7.4* 7.7* 7.5*   HCT 23.0* 24.0* 23.3*    202 210   MCV 90 90 89   MCH 29.0 28.8 28.5   MCHC 32.2 32.1 32.2       Significant Diagnostics:  None    Assessment/Plan:     BRBPR (bright red blood per rectum)  Ms. Huff is a 58 yo F with PMH of HTN, DM II, CAD, NSTEMI, s/p L5-S1 OLIF with NSGY 4/12 c/b intraoperative left ureteral injury and underwent ureteroureteral anastomoses and ureteral stent placement complicated by sepsis due to E.coli septicemia now s/p ex lap on 4/21 and PEG/Trach of the vent now having BRBPR.     S/p flex sig on 5/13  Placed hemablast in rectum on 5/15  Imodium QID to help with diarrhea  Transfuse as needed, cont to trend H/H  cont to hold hep gtt  Please call with any questions or concerns               John Barker MD  Colorectal Surgery  [facility]  [place]

## 2019-05-18 NOTE — CARE UPDATE
BG goal 140 - 180   BG remains reasonably controlled on current SQ insulin regimen. Patient remains and is tolerating enteral nutrition.     Continue Novolog to 4 units q 4 hrs (wieght based dosing using 0.5 modifier)  Hold if TFs are stopped.  Low dose correction scale   BG monitoring q 4 hrs.      ** Please notify Endocrine for any change and/or advance in diet/TF**  ** Please call Endocrine for any BG related issues **     Discharge Recommendations:     TBD.

## 2019-05-18 NOTE — PROGRESS NOTES
Interval History/Significant Events: No bloody BM overnight. Febrile overnight (Tmax 100.6F); given tylenol with resolution. Doing well on trach collar. TF at goal of 40/h.    Follow-up For: Procedure(s) (LRB):  SIGMOIDOSCOPY, FLEXIBLE (N/A)        Objective:     Vital Signs (Most Recent):  Temp: 99.2 °F (37.3 °C) (05/18/19 0700)  Pulse: 100 (05/18/19 0747)  Resp: (!) 29 (05/18/19 0747)  BP: (!) 151/68 (05/18/19 0700)  SpO2: 100 % (05/18/19 0747) Vital Signs (24h Range):  Temp:  [98.9 °F (37.2 °C)-100.5 °F (38.1 °C)] 99.2 °F (37.3 °C)  Pulse:  [] 100  Resp:  [0-38] 29  SpO2:  [94 %-100 %] 100 %  BP: ()/(42-86) 151/68     Weight: 76 kg (167 lb 8.8 oz)  Body mass index is 27.88 kg/m².      Intake/Output Summary (Last 24 hours) at 5/18/2019 0815  Last data filed at 5/18/2019 0705  Gross per 24 hour   Intake 2340 ml   Output 1720 ml   Net 620 ml       Physical Exam   Constitutional: She appears well-developed and well-nourished.   HENT:   Head: Normocephalic and atraumatic.   Tracheostomy in place   Eyes: Right eye exhibits no discharge. Left eye exhibits no discharge.   Neck: Normal range of motion. Neck supple.   Cardiovascular: Normal rate and regular rhythm.   Pulmonary/Chest: Effort normal. No respiratory distress.   Abdominal: Soft.   Midline incision c/d/i.  PEG with no leak. Abdomen soft, non-distended.  Bowel sounds present   Genitourinary:   Genitourinary Comments: Nephrostomy tube in place with thin dark yellow output.  Fontenot removed     Musculoskeletal: Normal range of motion.   Neurological: She is alert.   Able to follow commands, denying pain.    Skin: Skin is warm and dry.   Psychiatric: She has a normal mood and affect.   Nursing note and vitals reviewed.      Vents:  Trach collar    Lines/Drains/Airways     Drain                 Nephrostomy 04/21/19 0629 Left 8 Fr. 27 days         Gastrostomy/Enterostomy 05/02/19 1134 Percutaneous endoscopic gastrostomy (PEG) LUQ decompression;feeding 15  days         Urethral Catheter 05/16/19 1040 16 Fr. 1 day          Airway                 Surgical Airway 05/02/19 1107 Shiley Cuffed 15 days          Peripheral Intravenous Line                 Midline Catheter Insertion/Assessment  - Single Lumen 05/07/19 1632 Left basilic vein (medial side of arm) 18g x 8cm 10 days         Peripheral IV - Single Lumen 05/17/19 1630 20 G;1 3/4 in Right Upper Arm less than 1 day                Significant Labs:    CBC/Anemia Profile:  Recent Labs   Lab 05/17/19  1134 05/17/19  1620 05/18/19  0256   WBC 13.63* 14.82* 14.37*   HGB 7.4* 7.7* 7.5*   HCT 23.0* 24.0* 23.3*    202 210   MCV 90 90 89   RDW 15.2* 15.3* 15.2*        Chemistries:  Recent Labs   Lab 05/17/19  0410 05/18/19  0256   * 148*   K 4.4 4.3   * 116*   CO2 23 24   BUN 36* 35*   CREATININE 0.8 0.9   CALCIUM 8.4* 8.7   ALBUMIN 1.5* 1.5*   PROT 5.1* 5.4*   BILITOT 0.4 0.4   ALKPHOS 92 96   ALT <5* <5*   AST 23 19   MG 1.8 1.7   PHOS 2.9 3.0       All pertinent labs within the past 24 hours have been reviewed.    Significant Imaging:  I have reviewed all pertinent imaging results/findings within the past 24 hours.      Scheduled Meds:   carvedilol  3.125 mg Per G Tube BID    ceFAZolin (ANCEF) IVPB  2 g Intravenous Q8H    chlorhexidine  15 mL Mouth/Throat BID    fluconazole 40 mg/ml  200 mg Per G Tube Daily    insulin aspart U-100  4 Units Subcutaneous Q24H    insulin aspart U-100  4 Units Subcutaneous Q24H    insulin aspart U-100  4 Units Subcutaneous Q24H    insulin aspart U-100  4 Units Subcutaneous Q24H    insulin aspart U-100  4 Units Subcutaneous Q24H    insulin aspart U-100  4 Units Subcutaneous Q24H    loperamide  2 mg Per G Tube QID    miconazole nitrate 2%   Topical (Top) BID    pantoprazole  40 mg Per G Tube Daily    rifAMpin (RIFADIN) IVPB  300 mg Intravenous Q12H    silodosin  4 mg Per G Tube Daily     Continuous Infusions:  PRN Meds:acetaminophen, albuterol-ipratropium,  dextrose 50%, glucagon (human recombinant), hydrALAZINE, HYDROmorphone, hydrOXYzine, insulin aspart U-100, labetalol, magnesium sulfate IVPB, magnesium sulfate IVPB, potassium chloride in water, promethazine (PHENERGAN) IVPB, sodium chloride 0.9%    Vital signs in last 24 hours:  Temp:  [98.9 °F (37.2 °C)-100.5 °F (38.1 °C)] 99.2 °F (37.3 °C)  Pulse:  [] 100  Resp:  [0-38] 29  SpO2:  [94 %-100 %] 100 %  BP: ()/(42-86) 151/68    Intake/Output last 3 shifts:  I/O last 3 completed shifts:  In: 3129 [I.V.:195; Blood:259; NG/GT:2575; IV Piggyback:100]  Out: 2390 [Urine:2390]  Intake/Output this shift:  I/O this shift:  In: 290 [NG/GT:290]  Out: 185 [Urine:185]    Problem Assessment/Plan  Cardiac/Vascular  CAD (coronary artery disease)  Assessment & Plan  ASSESSMENT/PLAN:   Mariann Huff is a 59 y.o. female s/p left ureteral injury on 4/12, who presented with fever, AMS, and intraabdominal abscess, taken for washout and ligation of left ureter with nephrostomy tube placement on 4/21. S/p Trach/PEG on 5/2. Episode of negative pressure pulmonary edema on 5/8 requiring mechanical ventilation, diuresis and low dose pressor.      Neuro:   - Pain control with fentanyl prn      Pulmonary:   - Been tolerating trach collar since 5/3 until episode of flash pulmonary edema on 5/8  - Pulmonary edema likely secondary to occlusion and negative pressure pulmonary edema  - CXR to follow edema  - ABGs prn  - satting well on trach collar        Cardiac:   - HDS at this time.  - TTE 5/8 with no evidence of right heart strain or significant valvular pathology, normal LV systolic function, EF 70%     Renal:    - S/p transection of left ureter 4/12 and subsequent ligation and PCT nephrostomy tube placement with IR 4/21  - Renal function improving.    - Monitor I/Os  - NAC administered for renal protection on 5/8     Intake/Output Summary (Last 24 hours) at 5/18/2019 0818  Last data filed at 5/18/2019 0705  Gross per 24 hour    Intake 2340 ml   Output 1720 ml   Net 620 ml          ID:   - Blood cultures 4/12 +E. Coli; sensitivities pending. Repeat Bld Cx show NGTD.   - UCx from 4/20 growing E. Coli; sensitivities are now back. Repeat Cx pending.   - ID following for assistance with abx therapy, DC'd vanc and zosyn 5/12, started Ancef, continuing rifampin  - AF overnight  - WBC trending down     Hem/Onc:   - H/H stable  - Cont to monitor     Endocrine:  - DM Type 2 at baseline    - TF @ goal  - LDSSI  - Endocrine following for assistance with blood glucose control.      Fluids/Electrolytes/Nutrition/GI:   - Free water flushes 300 cc q6h  - Replace electrolytes PRN    # Shock Liver          - Resolved           - Labs continue to show improvement          - Elevated LFTs now normalized          - Thrombocytopenia - now normalized          - INR normalized to 0.9     # Lactic Acidosis          - Now resolved       PPx:  - DVT: Lovenox, Hep gtt held for continued bloody BMs        DISPO:  - Continue SICU care  - Preparing for LTACH once bloody BMs are managed          Critical care was time spent personally by me on the following activities: development of treatment plan with patient or surrogate and bedside caregivers, discussions with consultants, evaluation of patient's response to treatment, examination of patient, ordering and performing treatments and interventions, ordering and review of laboratory studies, ordering and review of radiographic studies, pulse oximetry, re-evaluation of patient's condition.  This critical care time did not overlap with that of any other provider or involve time for any procedures.         Ehsan Espino MD  PGY2/CA1  Department of Anesthesiology  Pager: [phone]

## 2019-05-18 NOTE — PLAN OF CARE
CM received call from SICU MD - stated that pt is expected to be ready by Monday to transfer to LTAC. CM noted in Epic that no post acute referrals in process. CM presented to bedside to discuss d/c to LTAC and need for choices - CM provided the [facility] [facility] list for review. CM provided own contact number as well as M-F CM and SW. Pt's spouse stated he will review and f/u.    Update: MIGUEL received call from pt's spouse, Shamir - he provided [facility] as preferred choice. CM initiated a referral to [facility] via .

## 2019-05-18 NOTE — NURSING
While cleaning patient, patient exhibited increased anxiety coupled with oxygen saturations in the low 80s/high 70s, tachycardia, and hypertension. MD made aware. PRN anxiety medication given. Suctioned patient immediately. RT called into the room to assist. RT placed patient onto the ventilator. Patient's O2 saturations improved to the low 90s. Charge nurse made aware. Will continue to monitor.

## 2019-05-18 NOTE — SUBJECTIVE & OBJECTIVE
Subjective:     Interval History:  No bloody BMs overnight    Post-Op Info:  Procedure(s) (LRB):  SIGMOIDOSCOPY, FLEXIBLE (N/A)   5 Days Post-Op      Medications:  Continuous Infusions:  Scheduled Meds:   carvedilol  3.125 mg Per G Tube BID    ceFAZolin (ANCEF) IVPB  2 g Intravenous Q8H    chlorhexidine  15 mL Mouth/Throat BID    fluconazole 40 mg/ml  200 mg Per G Tube Daily    insulin aspart U-100  4 Units Subcutaneous Q24H    insulin aspart U-100  4 Units Subcutaneous Q24H    insulin aspart U-100  4 Units Subcutaneous Q24H    insulin aspart U-100  4 Units Subcutaneous Q24H    insulin aspart U-100  4 Units Subcutaneous Q24H    insulin aspart U-100  4 Units Subcutaneous Q24H    loperamide  2 mg Per G Tube QID    miconazole nitrate 2%   Topical (Top) BID    pantoprazole  40 mg Per G Tube Daily    rifAMpin (RIFADIN) IVPB  300 mg Intravenous Q12H    silodosin  4 mg Per G Tube Daily     PRN Meds:   acetaminophen    albuterol-ipratropium    dextrose 50%    glucagon (human recombinant)    hydrALAZINE    HYDROmorphone    hydrOXYzine    insulin aspart U-100    labetalol    magnesium sulfate IVPB    magnesium sulfate IVPB    potassium chloride in water    promethazine (PHENERGAN) IVPB    sodium chloride 0.9%        Objective:     Vital Signs (Most Recent):  Temp: 99.5 °F (37.5 °C) (05/18/19 1100)  Pulse: 94 (05/18/19 1130)  Resp: 19 (05/18/19 1130)  BP: (!) 144/63 (05/18/19 1100)  SpO2: 99 % (05/18/19 1130) Vital Signs (24h Range):  Temp:  [98.9 °F (37.2 °C)-100.5 °F (38.1 °C)] 99.5 °F (37.5 °C)  Pulse:  [] 94  Resp:  [0-38] 19  SpO2:  [95 %-100 %] 99 %  BP: ()/(42-86) 144/63     Intake/Output - Last 3 Shifts       05/16 0700 - 05/17 0659 05/17 0700 - 05/18 0659 05/18 0700 - 05/19 0659    I.V. (mL/kg) 199 (2.6) 195 (2.6)     Blood 480      NG/GT 1890 1965 450    IV Piggyback 100      Total Intake(mL/kg) 2669 (35.1) 2160 (28.4) 450 (5.9)    Urine (mL/kg/hr) 1735 (1) 1655 (0.9) 400  (1.1)    Drains       Stool 0 0     Total Output 1735 1655 400    Net +934 +505 +50           Stool Occurrence 2 x 2 x           Physical Exam   Constitutional: No distress.   Eyes: EOM are normal.   Neck: Neck supple.   Cardiovascular: Normal rate and regular rhythm.   Pulmonary/Chest: Effort normal. No respiratory distress.   Abdominal: Soft. She exhibits no distension. There is no tenderness.   Musculoskeletal: Normal range of motion.   Neurological: She is alert.   Skin: Skin is warm and dry.   Significant Labs:  BMP (Last 3 Results):   Recent Labs   Lab 05/16/19  0328 05/17/19  0410 05/18/19  0256   * 179* 174*    146* 148*   K 4.3 4.4 4.3   * 115* 116*   CO2 24 23 24   BUN 37* 36* 35*   CREATININE 1.1 0.8 0.9   CALCIUM 8.6* 8.4* 8.7   MG 1.8 1.8 1.7     CBC (Last 3 Results):   Recent Labs   Lab 05/17/19  1134 05/17/19  1620 05/18/19  0256   WBC 13.63* 14.82* 14.37*   RBC 2.55* 2.67* 2.63*   HGB 7.4* 7.7* 7.5*   HCT 23.0* 24.0* 23.3*    202 210   MCV 90 90 89   MCH 29.0 28.8 28.5   MCHC 32.2 32.1 32.2       Significant Diagnostics:  None

## 2019-05-18 NOTE — SUBJECTIVE & OBJECTIVE
Interval History: No acute events. Temp to 100.5. Patient now on trach collar. Good urine output. Cr stable at 0.8. Rectal bleeding improved.    Review of Systems    Objective:     Temp:  [98.9 °F (37.2 °C)-100.5 °F (38.1 °C)] 99.2 °F (37.3 °C)  Pulse:  [] 100  Resp:  [0-38] 29  SpO2:  [94 %-100 %] 100 %  BP: ()/(42-86) 151/68     Body mass index is 27.88 kg/m².    Date 05/18/19 0700 - 05/19/19 0659   Shift 5449-4656 2641-8859 3841-6079 24 Hour Total   INTAKE   NG/   290   Shift Total(mL/kg) 290(3.8)   290(3.8)   OUTPUT   Urine(mL/kg/hr) 185   185   Shift Total(mL/kg) 185(2.4)   185(2.4)   Weight (kg) 76 76 76 76     Bladder Scan Volume (mL): 27 mL (05/10/19 0846)  Post Void Cath Residual Output (mL): 27 mL (05/10/19 0846)    Drains     Drain                 Nephrostomy 04/21/19 0629 Left 8 Fr. 26 days         Gastrostomy/Enterostomy 05/02/19 1134 Percutaneous endoscopic gastrostomy (PEG) LUQ decompression;feeding 14 days         Trialysis (Dialysis) Catheter 05/12/19 1259 left internal jugular 4 days         Urethral Catheter 05/16/19 1040 16 Fr. 1 day                Physical Exam   Constitutional: She appears well-developed. No distress.   HENT:   Head: Normocephalic and atraumatic.   Neck: No JVD present.   Cardiovascular: Normal rate and regular rhythm.    Pulmonary/Chest: Effort normal. No respiratory distress.   On trach collar   Abdominal: Soft. She exhibits no distension. There is no rebound and no guarding.   Incision c/d/i   G-tube   Genitourinary:   Genitourinary Comments: Fontenot in place draining clear yellow urine  Left neph tube with clear yellow urine   Neurological: She is alert.   Skin: Skin is warm and dry. She is not diaphoretic. No pallor.       Significant Labs:    BMP:  Recent Labs   Lab 05/16/19  0328 05/17/19  0410 05/18/19  0256    146* 148*   K 4.3 4.4 4.3   * 115* 116*   CO2 24 23 24   BUN 37* 36* 35*   CREATININE 1.1 0.8 0.9   CALCIUM 8.6* 8.4* 8.7       CBC:    Recent Labs   Lab 05/17/19  1134 05/17/19  1620 05/18/19  0256   WBC 13.63* 14.82* 14.37*   HGB 7.4* 7.7* 7.5*   HCT 23.0* 24.0* 23.3*    202 210     Significant Imaging:  All pertinent imaging results/findings from the past 24 hours have been reviewed.

## 2019-05-18 NOTE — ASSESSMENT & PLAN NOTE
ASSESSMENT/PLAN:   Mariann Huff is a 59 y.o. female s/p left ureteral injury on 4/12, who presented with fever, AMS, and intraabdominal abscess, taken for washout and ligation of left ureter with nephrostomy tube placement on 4/21. S/p Trach/PEG on 5/2. Episode of negative pressure pulmonary edema on 5/8 requiring mechanical ventilation, diuresis and low dose pressor.      Neuro:   - Pain control with fentanyl prn      Pulmonary:   - Been tolerating trach collar since 5/3 until episode of flash pulmonary edema on 5/8  - Pulmonary edema likely secondary to occlusion and negative pressure pulmonary edema  - CXR to follow edema  - ABGs prn  - satting well on trach collar        Cardiac:   - HDS at this time.  - TTE 5/8 with no evidence of right heart strain or significant valvular pathology, normal LV systolic function, EF 70%     Renal:    - S/p transection of left ureter 4/12 and subsequent ligation and PCT nephrostomy tube placement with IR 4/21  - Renal function improving.    - Monitor I/Os  - NAC administered for renal protection on 5/8     Intake/Output Summary (Last 24 hours) at 5/18/2019 0818  Last data filed at 5/18/2019 0705  Gross per 24 hour   Intake 2340 ml   Output 1720 ml   Net 620 ml          ID:   - Blood cultures 4/12 +E. Coli; sensitivities pending. Repeat Bld Cx show NGTD.   - UCx from 4/20 growing E. Coli; sensitivities are now back. Repeat Cx pending.   - ID following for assistance with abx therapy, DC'd vanc and zosyn 5/12, started Ancef, continuing rifampin  - AF overnight  - WBC trending down     Hem/Onc:   - H/H stable  - Cont to monitor     Endocrine:  - DM Type 2 at baseline    - TF @ goal  - LDSSI  - Endocrine following for assistance with blood glucose control.      Fluids/Electrolytes/Nutrition/GI:   - Free water flushes 300 cc q6h  - Replace electrolytes PRN    # Shock Liver          - Resolved           - Labs continue to show improvement          - Elevated LFTs now normalized           - Thrombocytopenia - now normalized          - INR normalized to 0.9     # Lactic Acidosis          - Now resolved       PPx:  - DVT: Lovenox, Hep gtt held for continued bloody BMs        DISPO:  - Continue SICU care  - Preparing for LTACH once bloody BMs are managed          Critical care was time spent personally by me on the following activities: development of treatment plan with patient or surrogate and bedside caregivers, discussions with consultants, evaluation of patient's response to treatment, examination of patient, ordering and performing treatments and interventions, ordering and review of laboratory studies, ordering and review of radiographic studies, pulse oximetry, re-evaluation of patient's condition.  This critical care time did not overlap with that of any other provider or involve time for any procedures.         Ehsan Espino MD  PGY2/CA1  Department of Anesthesiology  Pager: [phone]

## 2019-05-18 NOTE — PLAN OF CARE
Problem: Adult Inpatient Plan of Care  Goal: Plan of Care Review  Outcome: Ongoing (interventions implemented as appropriate)  Pt alert and oriented, remained on trach collar at minimal settings throughout night. Pt with 2 blood tinged BM's overnight.  VSS, NAD noted.  UOP marginal per correa, nephrostomy tube as noted.  Plan of care reviewed and discussed.  Questions encouraged.

## 2019-05-18 NOTE — ASSESSMENT & PLAN NOTE
Elizaamanda Huff is a 59 y.o. female s/p left ureteral injury on 4/12, presented with fever, AMS, and intraabdominal abscess, taken for washout and ligation of left ureter with neph tube placement on 4/21, Trach/PEG 5/2.  - on trach collar  - Tube feeds per SICU  - Ancef, Rifampin per ID recs   - 5/13 UCx growing Candida albicans; started course of Diflucan  - Maintain neph tube and Fontenot  - Urine output adequate, Cr stable at 0.8  - diuresis/fluids per SICU  - bloody BMs improving, Colorectal on board, appreciate recs; H&H currently stable slight downtrend    Dispo: continue ICU care; plan for transfer to LTAC once bloody BM resolve

## 2019-05-18 NOTE — PROGRESS NOTES
[facility]  Urology  Progress Note    Patient Name: Mariann Huff  MRN: [id number]  Admission Date: 4/20/2019  Hospital Length of Stay: 28 days  Code Status: Full Code   Attending Provider: Robin Boyd MD   Primary Care Physician: Jasbir Haney MD    Subjective:     HPI:  Mariann Huff is a 59 y.o. female with history of HTN, type 2 diabetes mellitus, CAD, NSTEMI, and back pain who presents to [facility] with altered mental status and sepsis. She underwent L5-S1 OLIF with NSGY on 4/12 and had intraoperative left ureteral injury with ureteroureteral anastomosis and ureteral stent placement. She did well initially and had correa and MADHURI drain removed on 4/16. She began having chills and altered mental status 2 days ago and this has progressively worsened. No complaints of pain.     She is altered and HPI is limited. In the ED she is febrile to 103 and tachycardic and pressures are low normal. WBC is 5, creatinine is 3.6 baseline 1.0, lactate is 4.6. Cath UA concerning for infection, 3+ LE, >100 WBCs, and many bacteria on microscopy.    CT and MRI abdomen both show air with minimal fluid near the surgical site with left ureter coursing through. There is air throughout the proximal collecting system which is decompressed with JJ ureteral stent in good position.    Taken for ex lap, ligation of left ureter and left neph tube placement on 4/21/19.    Interval History: No acute events. Temp to 100.5. Patient now on trach collar. Good urine output. Cr stable at 0.8. Rectal bleeding improved.    Review of Systems    Objective:     Temp:  [98.9 °F (37.2 °C)-100.5 °F (38.1 °C)] 99.2 °F (37.3 °C)  Pulse:  [] 100  Resp:  [0-38] 29  SpO2:  [94 %-100 %] 100 %  BP: ()/(42-86) 151/68     Body mass index is 27.88 kg/m².    Date 05/18/19 0700 - 05/19/19 0659   Shift 3412-8019 4925-0990 1037-0120 24 Hour Total   INTAKE   NG/   290   Shift Total(mL/kg) 290(3.8)   290(3.8)   OUTPUT   Urine(mL/kg/hr) 185    185   Shift Total(mL/kg) 185(2.4)   185(2.4)   Weight (kg) 76 76 76 76     Bladder Scan Volume (mL): 27 mL (05/10/19 0846)  Post Void Cath Residual Output (mL): 27 mL (05/10/19 0846)    Drains     Drain                 Nephrostomy 04/21/19 0629 Left 8 Fr. 26 days         Gastrostomy/Enterostomy 05/02/19 1134 Percutaneous endoscopic gastrostomy (PEG) LUQ decompression;feeding 14 days         Trialysis (Dialysis) Catheter 05/12/19 1259 left internal jugular 4 days         Urethral Catheter 05/16/19 1040 16 Fr. 1 day                Physical Exam   Constitutional: She appears well-developed. No distress.   HENT:   Head: Normocephalic and atraumatic.   Neck: No JVD present.   Cardiovascular: Normal rate and regular rhythm.    Pulmonary/Chest: Effort normal. No respiratory distress.   On trach collar   Abdominal: Soft. She exhibits no distension. There is no rebound and no guarding.   Incision c/d/i   G-tube   Genitourinary:   Genitourinary Comments: Fontenot in place draining clear yellow urine  Left neph tube with clear yellow urine   Neurological: She is alert.   Skin: Skin is warm and dry. She is not diaphoretic. No pallor.       Significant Labs:    BMP:  Recent Labs   Lab 05/16/19  0328 05/17/19  0410 05/18/19  0256    146* 148*   K 4.3 4.4 4.3   * 115* 116*   CO2 24 23 24   BUN 37* 36* 35*   CREATININE 1.1 0.8 0.9   CALCIUM 8.6* 8.4* 8.7       CBC:   Recent Labs   Lab 05/17/19  1134 05/17/19  1620 05/18/19  0256   WBC 13.63* 14.82* 14.37*   HGB 7.4* 7.7* 7.5*   HCT 23.0* 24.0* 23.3*    202 210     Significant Imaging:  All pertinent imaging results/findings from the past 24 hours have been reviewed.                  Assessment/Plan:     * Ureteral transection of left native kidney  Mariann Huff is a 59 y.o. female s/p left ureteral injury on 4/12, presented with fever, AMS, and intraabdominal abscess, taken for washout and ligation of left ureter with neph tube placement on 4/21, Trach/PEG  5/2.  - on trach collar  - Tube feeds per SICU  - Ancef, Rifampin per ID recs   - 5/13 UCx growing Candida albicans; started course of Diflucan  - Maintain neph tube and Fontenot  - Urine output adequate, Cr stable at 0.8  - diuresis/fluids per SICU  - bloody BMs improving, Colorectal on board, appreciate recs; H&H currently stable slight downtrend    Dispo: continue ICU care; plan for transfer to LTAC once bloody BM resolve    Sepsis  As above        VTE Risk Mitigation (From admission, onward)        Ordered     IP VTE LOW RISK PATIENT  Once      05/08/19 1315     Place sequential compression device  Until discontinued      04/20/19 8810          Hugh Higuera MD  Urology  [facility]

## 2019-05-19 LAB
ALBUMIN SERPL BCP-MCNC: 1.6 G/DL (ref 3.5–5.2)
ALBUMIN SERPL BCP-MCNC: 1.7 G/DL (ref 3.5–5.2)
ALLENS TEST: ABNORMAL
ALP SERPL-CCNC: 102 U/L (ref 55–135)
ALP SERPL-CCNC: 114 U/L (ref 55–135)
ALT SERPL W/O P-5'-P-CCNC: <5 U/L (ref 10–44)
ALT SERPL W/O P-5'-P-CCNC: <5 U/L (ref 10–44)
ANION GAP SERPL CALC-SCNC: 8 MMOL/L (ref 8–16)
ANION GAP SERPL CALC-SCNC: 8 MMOL/L (ref 8–16)
AST SERPL-CCNC: 18 U/L (ref 10–40)
AST SERPL-CCNC: 25 U/L (ref 10–40)
BACTERIA #/AREA URNS AUTO: ABNORMAL /HPF
BASOPHILS # BLD AUTO: 0.07 K/UL (ref 0–0.2)
BASOPHILS NFR BLD: 0.4 % (ref 0–1.9)
BILIRUB SERPL-MCNC: 0.5 MG/DL (ref 0.1–1)
BILIRUB SERPL-MCNC: 0.9 MG/DL (ref 0.1–1)
BILIRUB UR QL STRIP: NEGATIVE
BUN SERPL-MCNC: 41 MG/DL (ref 6–20)
BUN SERPL-MCNC: 43 MG/DL (ref 6–20)
CALCIUM SERPL-MCNC: 8.6 MG/DL (ref 8.7–10.5)
CALCIUM SERPL-MCNC: 8.8 MG/DL (ref 8.7–10.5)
CHLORIDE SERPL-SCNC: 113 MMOL/L (ref 95–110)
CHLORIDE SERPL-SCNC: 113 MMOL/L (ref 95–110)
CLARITY UR REFRACT.AUTO: ABNORMAL
CO2 SERPL-SCNC: 22 MMOL/L (ref 23–29)
CO2 SERPL-SCNC: 23 MMOL/L (ref 23–29)
COLOR UR AUTO: ABNORMAL
CREAT SERPL-MCNC: 1.1 MG/DL (ref 0.5–1.4)
CREAT SERPL-MCNC: 1.2 MG/DL (ref 0.5–1.4)
DELSYS: ABNORMAL
DIFFERENTIAL METHOD: ABNORMAL
EOSINOPHIL # BLD AUTO: 0.7 K/UL (ref 0–0.5)
EOSINOPHIL NFR BLD: 3.8 % (ref 0–8)
ERYTHROCYTE [DISTWIDTH] IN BLOOD BY AUTOMATED COUNT: 15 % (ref 11.5–14.5)
ERYTHROCYTE [SEDIMENTATION RATE] IN BLOOD BY WESTERGREN METHOD: 20 MM/H
ERYTHROCYTE [SEDIMENTATION RATE] IN BLOOD BY WESTERGREN METHOD: 26 MM/H
EST. GFR  (AFRICAN AMERICAN): 57.6 ML/MIN/1.73 M^2
EST. GFR  (AFRICAN AMERICAN): >60 ML/MIN/1.73 M^2
EST. GFR  (NON AFRICAN AMERICAN): 49.9 ML/MIN/1.73 M^2
EST. GFR  (NON AFRICAN AMERICAN): 55.5 ML/MIN/1.73 M^2
FIO2: 50
FIO2: 50
FIO2: 60
FIO2: 60
GLUCOSE SERPL-MCNC: 175 MG/DL (ref 70–110)
GLUCOSE SERPL-MCNC: 232 MG/DL (ref 70–110)
GLUCOSE UR QL STRIP: NEGATIVE
GRAM STN SPEC: NORMAL
GRAM STN SPEC: NORMAL
HCO3 UR-SCNC: 21.1 MMOL/L (ref 24–28)
HCO3 UR-SCNC: 21.8 MMOL/L (ref 24–28)
HCO3 UR-SCNC: 22.3 MMOL/L (ref 24–28)
HCO3 UR-SCNC: 22.4 MMOL/L (ref 24–28)
HCO3 UR-SCNC: 23.4 MMOL/L (ref 24–28)
HCT VFR BLD AUTO: 20.7 % (ref 37–48.5)
HCT VFR BLD AUTO: 23.3 % (ref 37–48.5)
HCT VFR BLD CALC: 19 %PCV (ref 36–54)
HGB BLD-MCNC: 6.5 G/DL (ref 12–16)
HGB BLD-MCNC: 7.9 G/DL (ref 12–16)
HGB UR QL STRIP: ABNORMAL
HYALINE CASTS UR QL AUTO: 0 /LPF
IMM GRANULOCYTES # BLD AUTO: 0.12 K/UL (ref 0–0.04)
IMM GRANULOCYTES NFR BLD AUTO: 0.6 % (ref 0–0.5)
INR PPP: 1 (ref 0.8–1.2)
KETONES UR QL STRIP: NEGATIVE
LEUKOCYTE ESTERASE UR QL STRIP: ABNORMAL
LYMPHOCYTES # BLD AUTO: 2.6 K/UL (ref 1–4.8)
LYMPHOCYTES NFR BLD: 13.9 % (ref 18–48)
MAGNESIUM SERPL-MCNC: 2 MG/DL (ref 1.6–2.6)
MCH RBC QN AUTO: 28.5 PG (ref 27–31)
MCHC RBC AUTO-ENTMCNC: 31.4 G/DL (ref 32–36)
MCV RBC AUTO: 91 FL (ref 82–98)
MICROSCOPIC COMMENT: ABNORMAL
MIN VOL: 11.3
MIN VOL: 7.51
MODE: ABNORMAL
MONOCYTES # BLD AUTO: 1.4 K/UL (ref 0.3–1)
MONOCYTES NFR BLD: 7.3 % (ref 4–15)
NEUTROPHILS # BLD AUTO: 13.7 K/UL (ref 1.8–7.7)
NEUTROPHILS NFR BLD: 74 % (ref 38–73)
NITRITE UR QL STRIP: NEGATIVE
NRBC BLD-RTO: 0 /100 WBC
PCO2 BLDA: 28.6 MMHG (ref 35–45)
PCO2 BLDA: 33.4 MMHG (ref 35–45)
PCO2 BLDA: 33.8 MMHG (ref 35–45)
PCO2 BLDA: 35.4 MMHG (ref 35–45)
PCO2 BLDA: 37.3 MMHG (ref 35–45)
PEEP: 10
PEEP: 10
PH SMN: 7.41 [PH] (ref 7.35–7.45)
PH SMN: 7.43 [PH] (ref 7.35–7.45)
PH SMN: 7.49 [PH] (ref 7.35–7.45)
PH UR STRIP: 5 [PH] (ref 5–8)
PHOSPHATE SERPL-MCNC: 2.7 MG/DL (ref 2.7–4.5)
PIP: 28
PIP: 32
PLATELET # BLD AUTO: 246 K/UL (ref 150–350)
PMV BLD AUTO: 11 FL (ref 9.2–12.9)
PO2 BLDA: 125 MMHG (ref 80–100)
PO2 BLDA: 190 MMHG (ref 80–100)
PO2 BLDA: 31 MMHG (ref 80–100)
PO2 BLDA: 52 MMHG (ref 80–100)
PO2 BLDA: 71 MMHG (ref 80–100)
POC BE: -1 MMOL/L
POC BE: -1 MMOL/L
POC BE: -2 MMOL/L
POC BE: -2 MMOL/L
POC BE: -4 MMOL/L
POC SATURATED O2: 100 % (ref 95–100)
POC SATURATED O2: 60 % (ref 95–100)
POC SATURATED O2: 87 % (ref 95–100)
POC SATURATED O2: 95 % (ref 95–100)
POC SATURATED O2: 99 % (ref 95–100)
POC TCO2: 22 MMOL/L (ref 23–27)
POC TCO2: 23 MMOL/L (ref 23–27)
POC TCO2: 24 MMOL/L (ref 23–27)
POCT GLUCOSE: 190 MG/DL (ref 70–110)
POCT GLUCOSE: 208 MG/DL (ref 70–110)
POCT GLUCOSE: 210 MG/DL (ref 70–110)
POCT GLUCOSE: 227 MG/DL (ref 70–110)
POCT GLUCOSE: 237 MG/DL (ref 70–110)
POCT GLUCOSE: 244 MG/DL (ref 70–110)
POTASSIUM SERPL-SCNC: 4 MMOL/L (ref 3.5–5.1)
POTASSIUM SERPL-SCNC: 4.1 MMOL/L (ref 3.5–5.1)
PROT SERPL-MCNC: 5.6 G/DL (ref 6–8.4)
PROT SERPL-MCNC: 5.7 G/DL (ref 6–8.4)
PROT UR QL STRIP: ABNORMAL
PROTHROMBIN TIME: 10.7 SEC (ref 9–12.5)
PROVIDER CREDENTIALS: ABNORMAL
PROVIDER NOTIFIED: ABNORMAL
RBC # BLD AUTO: 2.28 M/UL (ref 4–5.4)
RBC #/AREA URNS AUTO: 3 /HPF (ref 0–4)
SAMPLE: ABNORMAL
SITE: ABNORMAL
SODIUM SERPL-SCNC: 143 MMOL/L (ref 136–145)
SODIUM SERPL-SCNC: 144 MMOL/L (ref 136–145)
SP GR UR STRIP: 1.02 (ref 1–1.03)
SP02: 100
SP02: 96
SP02: 96
SP02: 99
SQUAMOUS #/AREA URNS AUTO: 1 /HPF
URN SPEC COLLECT METH UR: ABNORMAL
VERBAL RESULT READBACK PERFORMED: YES
VT: 400
VT: 400
WBC # BLD AUTO: 18.52 K/UL (ref 3.9–12.7)
WBC #/AREA URNS AUTO: 20 /HPF (ref 0–5)

## 2019-05-19 PROCEDURE — 87070 CULTURE OTHR SPECIMN AEROBIC: CPT | Mod: 59

## 2019-05-19 PROCEDURE — 99232 SBSQ HOSP IP/OBS MODERATE 35: CPT | Mod: ,,, | Performed by: SURGERY

## 2019-05-19 PROCEDURE — 80053 COMPREHEN METABOLIC PANEL: CPT | Mod: 91

## 2019-05-19 PROCEDURE — 87077 CULTURE AEROBIC IDENTIFY: CPT

## 2019-05-19 PROCEDURE — 25000003 PHARM REV CODE 250

## 2019-05-19 PROCEDURE — 99232 SBSQ HOSP IP/OBS MODERATE 35: CPT | Mod: ,,, | Performed by: NURSE PRACTITIONER

## 2019-05-19 PROCEDURE — 83735 ASSAY OF MAGNESIUM: CPT

## 2019-05-19 PROCEDURE — 87106 FUNGI IDENTIFICATION YEAST: CPT

## 2019-05-19 PROCEDURE — 25000003 PHARM REV CODE 250: Performed by: STUDENT IN AN ORGANIZED HEALTH CARE EDUCATION/TRAINING PROGRAM

## 2019-05-19 PROCEDURE — 63600175 PHARM REV CODE 636 W HCPCS: Performed by: UROLOGY

## 2019-05-19 PROCEDURE — 87086 URINE CULTURE/COLONY COUNT: CPT

## 2019-05-19 PROCEDURE — 99499 UNLISTED E&M SERVICE: CPT | Mod: ,,, | Performed by: PHYSICIAN ASSISTANT

## 2019-05-19 PROCEDURE — 99900026 HC AIRWAY MAINTENANCE (STAT)

## 2019-05-19 PROCEDURE — 87040 BLOOD CULTURE FOR BACTERIA: CPT

## 2019-05-19 PROCEDURE — 99232 PR SUBSEQUENT HOSPITAL CARE,LEVL II: ICD-10-PCS | Mod: ,,, | Performed by: NURSE PRACTITIONER

## 2019-05-19 PROCEDURE — 27000221 HC OXYGEN, UP TO 24 HOURS

## 2019-05-19 PROCEDURE — 63600175 PHARM REV CODE 636 W HCPCS: Performed by: STUDENT IN AN ORGANIZED HEALTH CARE EDUCATION/TRAINING PROGRAM

## 2019-05-19 PROCEDURE — P9045 ALBUMIN (HUMAN), 5%, 250 ML: HCPCS | Mod: JG | Performed by: STUDENT IN AN ORGANIZED HEALTH CARE EDUCATION/TRAINING PROGRAM

## 2019-05-19 PROCEDURE — 87205 SMEAR GRAM STAIN: CPT

## 2019-05-19 PROCEDURE — 81001 URINALYSIS AUTO W/SCOPE: CPT

## 2019-05-19 PROCEDURE — 27100092 HC HIGH FLOW DELIVERY CANNULA

## 2019-05-19 PROCEDURE — 99499 NO LOS: ICD-10-PCS | Mod: ,,, | Performed by: PHYSICIAN ASSISTANT

## 2019-05-19 PROCEDURE — 87070 CULTURE OTHR SPECIMN AEROBIC: CPT

## 2019-05-19 PROCEDURE — 94003 VENT MGMT INPAT SUBQ DAY: CPT

## 2019-05-19 PROCEDURE — P9021 RED BLOOD CELLS UNIT: HCPCS

## 2019-05-19 PROCEDURE — 80053 COMPREHEN METABOLIC PANEL: CPT

## 2019-05-19 PROCEDURE — 25000242 PHARM REV CODE 250 ALT 637 W/ HCPCS: Performed by: STUDENT IN AN ORGANIZED HEALTH CARE EDUCATION/TRAINING PROGRAM

## 2019-05-19 PROCEDURE — 36600 WITHDRAWAL OF ARTERIAL BLOOD: CPT

## 2019-05-19 PROCEDURE — 87088 URINE BACTERIA CULTURE: CPT

## 2019-05-19 PROCEDURE — 87205 SMEAR GRAM STAIN: CPT | Mod: 59

## 2019-05-19 PROCEDURE — 87102 FUNGUS ISOLATION CULTURE: CPT

## 2019-05-19 PROCEDURE — 37799 UNLISTED PX VASCULAR SURGERY: CPT

## 2019-05-19 PROCEDURE — 99233 PR SUBSEQUENT HOSPITAL CARE,LEVL III: ICD-10-PCS | Mod: ,,, | Performed by: INTERNAL MEDICINE

## 2019-05-19 PROCEDURE — 99900035 HC TECH TIME PER 15 MIN (STAT)

## 2019-05-19 PROCEDURE — 82803 BLOOD GASES ANY COMBINATION: CPT

## 2019-05-19 PROCEDURE — 85014 HEMATOCRIT: CPT

## 2019-05-19 PROCEDURE — 99232 PR SUBSEQUENT HOSPITAL CARE,LEVL II: ICD-10-PCS | Mod: ,,, | Performed by: SURGERY

## 2019-05-19 PROCEDURE — 85610 PROTHROMBIN TIME: CPT

## 2019-05-19 PROCEDURE — 31720 CLEARANCE OF AIRWAYS: CPT

## 2019-05-19 PROCEDURE — 85025 COMPLETE CBC W/AUTO DIFF WBC: CPT

## 2019-05-19 PROCEDURE — 20000000 HC ICU ROOM

## 2019-05-19 PROCEDURE — 63600175 PHARM REV CODE 636 W HCPCS

## 2019-05-19 PROCEDURE — 84100 ASSAY OF PHOSPHORUS: CPT

## 2019-05-19 PROCEDURE — 85018 HEMOGLOBIN: CPT

## 2019-05-19 PROCEDURE — 87186 SC STD MICRODIL/AGAR DIL: CPT

## 2019-05-19 PROCEDURE — 94640 AIRWAY INHALATION TREATMENT: CPT

## 2019-05-19 PROCEDURE — 94761 N-INVAS EAR/PLS OXIMETRY MLT: CPT

## 2019-05-19 PROCEDURE — 99233 SBSQ HOSP IP/OBS HIGH 50: CPT | Mod: ,,, | Performed by: INTERNAL MEDICINE

## 2019-05-19 RX ORDER — FUROSEMIDE 10 MG/ML
INJECTION INTRAMUSCULAR; INTRAVENOUS
Status: COMPLETED
Start: 2019-05-19 | End: 2019-05-19

## 2019-05-19 RX ORDER — VANCOMYCIN HCL IN 5 % DEXTROSE 1.25 G/25
15 PLASTIC BAG, INJECTION (ML) INTRAVENOUS
Status: DISCONTINUED | OUTPATIENT
Start: 2019-05-19 | End: 2019-05-19

## 2019-05-19 RX ORDER — INSULIN ASPART 100 [IU]/ML
6 INJECTION, SOLUTION INTRAVENOUS; SUBCUTANEOUS
Status: DISCONTINUED | OUTPATIENT
Start: 2019-05-20 | End: 2019-05-20

## 2019-05-19 RX ORDER — INSULIN ASPART 100 [IU]/ML
6 INJECTION, SOLUTION INTRAVENOUS; SUBCUTANEOUS
Status: DISCONTINUED | OUTPATIENT
Start: 2019-05-19 | End: 2019-05-20

## 2019-05-19 RX ORDER — VANCOMYCIN HCL IN 5 % DEXTROSE 1.5G/250ML
1500 PLASTIC BAG, INJECTION (ML) INTRAVENOUS
Status: DISCONTINUED | OUTPATIENT
Start: 2019-05-19 | End: 2019-05-22

## 2019-05-19 RX ORDER — LIDOCAINE HYDROCHLORIDE 10 MG/ML
10 INJECTION INFILTRATION; PERINEURAL ONCE
Status: COMPLETED | OUTPATIENT
Start: 2019-05-19 | End: 2019-05-19

## 2019-05-19 RX ORDER — HYDROCODONE BITARTRATE AND ACETAMINOPHEN 500; 5 MG/1; MG/1
TABLET ORAL
Status: DISCONTINUED | OUTPATIENT
Start: 2019-05-19 | End: 2019-05-21

## 2019-05-19 RX ORDER — DEXMEDETOMIDINE HYDROCHLORIDE 4 UG/ML
0.2 INJECTION, SOLUTION INTRAVENOUS CONTINUOUS
Status: DISCONTINUED | OUTPATIENT
Start: 2019-05-19 | End: 2019-05-22

## 2019-05-19 RX ORDER — DEXMEDETOMIDINE HYDROCHLORIDE 4 UG/ML
INJECTION, SOLUTION INTRAVENOUS
Status: COMPLETED
Start: 2019-05-19 | End: 2019-05-19

## 2019-05-19 RX ORDER — CEFEPIME HYDROCHLORIDE 2 G/1
2 INJECTION, POWDER, FOR SOLUTION INTRAVENOUS
Status: DISCONTINUED | OUTPATIENT
Start: 2019-05-19 | End: 2019-05-23

## 2019-05-19 RX ORDER — FUROSEMIDE 10 MG/ML
20 INJECTION INTRAMUSCULAR; INTRAVENOUS ONCE
Status: COMPLETED | OUTPATIENT
Start: 2019-05-19 | End: 2019-05-19

## 2019-05-19 RX ORDER — LIDOCAINE HYDROCHLORIDE 10 MG/ML
INJECTION, SOLUTION EPIDURAL; INFILTRATION; INTRACAUDAL; PERINEURAL
Status: COMPLETED
Start: 2019-05-19 | End: 2019-05-19

## 2019-05-19 RX ORDER — FUROSEMIDE 10 MG/ML
40 INJECTION INTRAMUSCULAR; INTRAVENOUS ONCE
Status: COMPLETED | OUTPATIENT
Start: 2019-05-19 | End: 2019-05-19

## 2019-05-19 RX ORDER — ALBUMIN HUMAN 50 G/1000ML
12.5 SOLUTION INTRAVENOUS ONCE
Status: COMPLETED | OUTPATIENT
Start: 2019-05-19 | End: 2019-05-19

## 2019-05-19 RX ADMIN — INSULIN ASPART 2 UNITS: 100 INJECTION, SOLUTION INTRAVENOUS; SUBCUTANEOUS at 08:05

## 2019-05-19 RX ADMIN — FLUCONAZOLE 200 MG: 40 POWDER, FOR SUSPENSION ORAL at 08:05

## 2019-05-19 RX ADMIN — RIFAMPIN 300 MG: 600 INJECTION, POWDER, LYOPHILIZED, FOR SOLUTION INTRAVENOUS at 03:05

## 2019-05-19 RX ADMIN — PANTOPRAZOLE SODIUM 40 MG: 40 GRANULE, DELAYED RELEASE ORAL at 08:05

## 2019-05-19 RX ADMIN — Medication 2 MG: at 10:05

## 2019-05-19 RX ADMIN — ACETAMINOPHEN 650 MG: 325 TABLET ORAL at 10:05

## 2019-05-19 RX ADMIN — INSULIN ASPART 6 UNITS: 100 INJECTION, SOLUTION INTRAVENOUS; SUBCUTANEOUS at 04:05

## 2019-05-19 RX ADMIN — CEFAZOLIN 2 G: 1 INJECTION, POWDER, FOR SOLUTION INTRAMUSCULAR; INTRAVENOUS at 02:05

## 2019-05-19 RX ADMIN — HYDROMORPHONE HYDROCHLORIDE 1 MG: 1 INJECTION, SOLUTION INTRAMUSCULAR; INTRAVENOUS; SUBCUTANEOUS at 09:05

## 2019-05-19 RX ADMIN — ALBUMIN HUMAN 12.5 G: 0.05 INJECTION, SOLUTION INTRAVENOUS at 02:05

## 2019-05-19 RX ADMIN — LIDOCAINE HYDROCHLORIDE 10 ML: 10 INJECTION INFILTRATION; PERINEURAL at 06:05

## 2019-05-19 RX ADMIN — INSULIN ASPART 6 UNITS: 100 INJECTION, SOLUTION INTRAVENOUS; SUBCUTANEOUS at 08:05

## 2019-05-19 RX ADMIN — DEXMEDETOMIDINE HYDROCHLORIDE 0.2 MCG/KG/HR: 4 INJECTION, SOLUTION INTRAVENOUS at 02:05

## 2019-05-19 RX ADMIN — HYDROXYZINE HYDROCHLORIDE 10 MG: 10 SOLUTION ORAL at 02:05

## 2019-05-19 RX ADMIN — CARVEDILOL 3.12 MG: 3.12 TABLET, FILM COATED ORAL at 08:05

## 2019-05-19 RX ADMIN — CHLORHEXIDINE GLUCONATE 0.12% ORAL RINSE 15 ML: 1.2 LIQUID ORAL at 08:05

## 2019-05-19 RX ADMIN — INSULIN ASPART 1 UNITS: 100 INJECTION, SOLUTION INTRAVENOUS; SUBCUTANEOUS at 12:05

## 2019-05-19 RX ADMIN — FUROSEMIDE 20 MG: 10 INJECTION, SOLUTION INTRAMUSCULAR; INTRAVENOUS at 10:05

## 2019-05-19 RX ADMIN — INSULIN ASPART 4 UNITS: 100 INJECTION, SOLUTION INTRAVENOUS; SUBCUTANEOUS at 08:05

## 2019-05-19 RX ADMIN — INSULIN ASPART 2 UNITS: 100 INJECTION, SOLUTION INTRAVENOUS; SUBCUTANEOUS at 12:05

## 2019-05-19 RX ADMIN — HYDRALAZINE HYDROCHLORIDE 10 MG: 20 INJECTION INTRAMUSCULAR; INTRAVENOUS at 01:05

## 2019-05-19 RX ADMIN — Medication 2 MG: at 01:05

## 2019-05-19 RX ADMIN — HYDROMORPHONE HYDROCHLORIDE 1 MG: 1 INJECTION, SOLUTION INTRAMUSCULAR; INTRAVENOUS; SUBCUTANEOUS at 05:05

## 2019-05-19 RX ADMIN — IPRATROPIUM BROMIDE AND ALBUTEROL SULFATE 3 ML: .5; 3 SOLUTION RESPIRATORY (INHALATION) at 01:05

## 2019-05-19 RX ADMIN — SILODOSIN 4 MG: 4 CAPSULE ORAL at 08:05

## 2019-05-19 RX ADMIN — CEFAZOLIN 2 G: 1 INJECTION, POWDER, FOR SOLUTION INTRAMUSCULAR; INTRAVENOUS at 06:05

## 2019-05-19 RX ADMIN — LIDOCAINE HYDROCHLORIDE 10 ML: 10 INJECTION, SOLUTION EPIDURAL; INFILTRATION; INTRACAUDAL; PERINEURAL at 06:05

## 2019-05-19 RX ADMIN — CHLORHEXIDINE GLUCONATE 0.12% ORAL RINSE 15 ML: 1.2 LIQUID ORAL at 09:05

## 2019-05-19 RX ADMIN — Medication 2 MG: at 05:05

## 2019-05-19 RX ADMIN — INSULIN ASPART 2 UNITS: 100 INJECTION, SOLUTION INTRAVENOUS; SUBCUTANEOUS at 04:05

## 2019-05-19 RX ADMIN — ACETAMINOPHEN 650 MG: 325 TABLET ORAL at 04:05

## 2019-05-19 RX ADMIN — CEFEPIME 2 G: 2 INJECTION, POWDER, FOR SOLUTION INTRAVENOUS at 09:05

## 2019-05-19 RX ADMIN — IPRATROPIUM BROMIDE AND ALBUTEROL SULFATE 3 ML: .5; 3 SOLUTION RESPIRATORY (INHALATION) at 09:05

## 2019-05-19 RX ADMIN — FUROSEMIDE 40 MG: 10 INJECTION, SOLUTION INTRAMUSCULAR; INTRAVENOUS at 05:05

## 2019-05-19 RX ADMIN — DEXMEDETOMIDINE HYDROCHLORIDE 0.2 MCG/KG/HR: 400 INJECTION INTRAVENOUS at 02:05

## 2019-05-19 RX ADMIN — INSULIN ASPART 4 UNITS: 100 INJECTION, SOLUTION INTRAVENOUS; SUBCUTANEOUS at 12:05

## 2019-05-19 RX ADMIN — INSULIN ASPART 4 UNITS: 100 INJECTION, SOLUTION INTRAVENOUS; SUBCUTANEOUS at 04:05

## 2019-05-19 RX ADMIN — HYDROMORPHONE HYDROCHLORIDE 1 MG: 1 INJECTION, SOLUTION INTRAMUSCULAR; INTRAVENOUS; SUBCUTANEOUS at 12:05

## 2019-05-19 RX ADMIN — CARVEDILOL 3.12 MG: 3.12 TABLET, FILM COATED ORAL at 09:05

## 2019-05-19 RX ADMIN — FUROSEMIDE 40 MG: 10 INJECTION, SOLUTION INTRAMUSCULAR; INTRAVENOUS at 02:05

## 2019-05-19 RX ADMIN — Medication 2 MG: at 11:05

## 2019-05-19 RX ADMIN — CEFAZOLIN 2 G: 1 INJECTION, POWDER, FOR SOLUTION INTRAMUSCULAR; INTRAVENOUS at 09:05

## 2019-05-19 RX ADMIN — IPRATROPIUM BROMIDE AND ALBUTEROL SULFATE 3 ML: .5; 3 SOLUTION RESPIRATORY (INHALATION) at 05:05

## 2019-05-19 RX ADMIN — MICONAZOLE NITRATE: 20 OINTMENT TOPICAL at 09:05

## 2019-05-19 RX ADMIN — VANCOMYCIN HYDROCHLORIDE 1500 MG: 100 INJECTION, POWDER, LYOPHILIZED, FOR SOLUTION INTRAVENOUS at 09:05

## 2019-05-19 RX ADMIN — MICONAZOLE NITRATE: 20 OINTMENT TOPICAL at 08:05

## 2019-05-19 RX ADMIN — HYDROXYZINE HYDROCHLORIDE 10 MG: 10 SOLUTION ORAL at 09:05

## 2019-05-19 NOTE — NURSING
MD made aware of patient's elevated temperature (100.9) and elevated white blood cell count (18.52). No orders given at this time. Will administer PRN acetaminophen. Will continue to monitor.

## 2019-05-19 NOTE — SUBJECTIVE & OBJECTIVE
Subjective:     Interval History: small bloody BM yesterday but otherwise brown stool, H/H slowly drifted down, transfusing 1uPRBCs now    Post-Op Info:  Procedure(s) (LRB):  SIGMOIDOSCOPY, FLEXIBLE (N/A)   6 Days Post-Op      Medications:  Continuous Infusions:  Scheduled Meds:   carvedilol  3.125 mg Per G Tube BID    ceFAZolin (ANCEF) IVPB  2 g Intravenous Q8H    chlorhexidine  15 mL Mouth/Throat BID    fluconazole 40 mg/ml  200 mg Per G Tube Daily    insulin aspart U-100  4 Units Subcutaneous Q24H    insulin aspart U-100  4 Units Subcutaneous Q24H    insulin aspart U-100  4 Units Subcutaneous Q24H    insulin aspart U-100  4 Units Subcutaneous Q24H    insulin aspart U-100  4 Units Subcutaneous Q24H    insulin aspart U-100  4 Units Subcutaneous Q24H    loperamide  2 mg Per G Tube QID    miconazole nitrate 2%   Topical (Top) BID    pantoprazole  40 mg Per G Tube Daily    rifAMpin (RIFADIN) IVPB  300 mg Intravenous Q12H    silodosin  4 mg Per G Tube Daily     PRN Meds:   sodium chloride    acetaminophen    albuterol-ipratropium    dextrose 50%    glucagon (human recombinant)    hydrALAZINE    HYDROmorphone    hydrOXYzine    insulin aspart U-100    labetalol    magnesium sulfate IVPB    magnesium sulfate IVPB    ondansetron    potassium chloride in water    promethazine (PHENERGAN) IVPB    sodium chloride 0.9%        Objective:     Vital Signs (Most Recent):  Temp: 99.9 °F (37.7 °C) (05/19/19 0713)  Pulse: 99 (05/19/19 0934)  Resp: 14 (05/19/19 0934)  BP: (!) 162/71 (05/19/19 0900)  SpO2: 98 % (05/19/19 0934) Vital Signs (24h Range):  Temp:  [98.5 °F (36.9 °C)-100.9 °F (38.3 °C)] 99.9 °F (37.7 °C)  Pulse:  [] 99  Resp:  [14-51] 14  SpO2:  [87 %-100 %] 98 %  BP: ()/(46-84) 162/71     Intake/Output - Last 3 Shifts       05/17 0700 - 05/18 0659 05/18 0700 - 05/19 0659 05/19 0700 - 05/20 0659    I.V. (mL/kg) 195 (2.6) 211 (2.8)     Blood       NG/GT 3514 1460 370    IV  Piggyback       Total Intake(mL/kg) 2160 (28.4) 1671 (22) 370 (4.9)    Urine (mL/kg/hr) 1655 (0.9) 1300 (0.7) 480 (2.2)    Stool 0      Total Output 1655 1300 480    Net +505 +371 -110           Stool Occurrence 2 x            Physical Exam   Constitutional: She is oriented to person, place, and time. No distress.   Eyes: EOM are normal.   Neck: Neck supple.   Cardiovascular: Normal rate and regular rhythm.   Pulmonary/Chest: Effort normal. No respiratory distress.   Abdominal: Soft. She exhibits no distension. There is no tenderness.   Musculoskeletal: Normal range of motion.   Neurological: She is alert and oriented to person, place, and time.   Skin: Skin is warm and dry.       Significant Labs:  BMP (Last 3 Results):   Recent Labs   Lab 05/17/19  0410 05/18/19  0256 05/19/19  0336   * 174* 175*   * 148* 144   K 4.4 4.3 4.1   * 116* 113*   CO2 23 24 23   BUN 36* 35* 41*   CREATININE 0.8 0.9 1.2   CALCIUM 8.4* 8.7 8.8   MG 1.8 1.7 2.0     CBC (Last 3 Results):   Recent Labs   Lab 05/17/19  1620 05/18/19  0256 05/19/19  0336 05/19/19  0549   WBC 14.82* 14.37* 18.52*  --    RBC 2.67* 2.63* 2.28*  --    HGB 7.7* 7.5* 6.5*  --    HCT 24.0* 23.3* 20.7* 19*    210 246  --    MCV 90 89 91  --    MCH 28.8 28.5 28.5  --    MCHC 32.1 32.2 31.4*  --        Significant Diagnostics:  None

## 2019-05-19 NOTE — PROGRESS NOTES
This note also relates to the following rows which could not be included:  Oxygen Concentration (%) - Cannot attach notes to unvalidated device data  SpO2 - Cannot attach notes to unvalidated device data  Pulse - Cannot attach notes to unvalidated device data  Resp - Cannot attach notes to unvalidated device data  Vital Capacity (mL) - Cannot attach notes to unvalidated device data  Ventilation Type - Cannot attach notes to unvalidated device data  Vent Mode - Cannot attach notes to unvalidated device data  PEEP/CPAP - Cannot attach notes to unvalidated device data  Initial % Support Level - Cannot attach notes to unvalidated device data  I-Trigger Type  - Cannot attach notes to unvalidated device data  Trigger Sensitivity Flow/I-Trigger - Cannot attach notes to unvalidated device data  Resp Rate Total - Cannot attach notes to unvalidated device data  Peak Airway Pressure - Cannot attach notes to unvalidated device data  Mean Airway Pressure - Cannot attach notes to unvalidated device data  Plateau Pressure - Cannot attach notes to unvalidated device data  Exhaled Vt - Cannot attach notes to unvalidated device data  Total Ve - Cannot attach notes to unvalidated device data  Spont Ve - Cannot attach notes to unvalidated device data  I:E Ratio Measured - Cannot attach notes to unvalidated device data  Airway Compliance - Cannot attach notes to unvalidated device data  Airway Resistance - Cannot attach notes to unvalidated device data  Total Airway Resistance - Cannot attach notes to unvalidated device data  Auto PEEP - Cannot attach notes to unvalidated device data  Total PEEP - Cannot attach notes to unvalidated device data  Tubing ID (mm) - Cannot attach notes to unvalidated device data  Inspired Tidal Volume (VTI) - Cannot attach notes to unvalidated device data  Lung Elastance (EPAV) - Cannot attach notes to unvalidated device data  Ve High Alarm - Cannot attach notes to unvalidated device data  Ve Low Alarm -  Cannot attach notes to unvalidated device data  Resp Rate High Alarm - Cannot attach notes to unvalidated device data  Press High Alarm - Cannot attach notes to unvalidated device data  Apnea Rate - Cannot attach notes to unvalidated device data  Apnea Volume (mL) - Cannot attach notes to unvalidated device data  Apnea Oxygen Concentration  - Cannot attach notes to unvalidated device data  Apnea Flow Rate (L/min) - Cannot attach notes to unvalidated device data  T Apnea - Cannot attach notes to unvalidated device data  RRT increased PT PAV support to 50% because pt WOB and secretion  Increased. Pt was giving PRN breathing TX and WOB  RR went down to 23

## 2019-05-19 NOTE — ASSESSMENT & PLAN NOTE
BG goal 140 - 180    Increase Novolog 6 units q 4 hrs (persistent correction scale has been needed)  Hold if TFs are stopped.  Low dose correction scale   BG monitoring q 4 hrs.     ** Please notify Endocrine for any change and/or advance in diet/TF**  ** Please call Endocrine for any BG related issues **    Discharge Recommendations:     TBD.

## 2019-05-19 NOTE — PLAN OF CARE
Problem: Adult Inpatient Plan of Care  Goal: Plan of Care Review  Problem: Adult Inpatient Plan of Care  Goal: Plan of Care Review  Outcome: Ongoing (interventions implemented as appropriate)  Dx: Ureteral transection of left native kidney     Shift Events:  PRN dilaudid given to maintain adequate pain control. 5 BMs during shift. Blood tinged BMs.Trach collar throughout shift 40% 10 L. Patient back on SPONT ventilator at 1800 for low O2 saturations (low 80s) following increased anxiety episode. See note. Blood sugars checked q 4 hours. PRN and scheduled insulin given to for blood sugars within range. Plan of care reviewed with patient and spouse. Questions and concerns addressed. Will continue to monitor.      Neuro: Arouses to Voice and Follows Commands Moves all extremities      Diet: NPO and Tube Feeds     Gtts: None     Urine Output: Urinary Catheter 360 cc/shift   Nephrostomy Tube 300 cc/shift      Labs/Accuchecks: Accuchecks q 4 hours. Daily labs.     Skin: Bilateral lower back wounds cleansed with normal saline, medi-honey applied, and foam dressings changed.  Miconazole ointment and triad cream applied to buttocks, perirectal region, and groin to prevent further breakdown. Patient turned q 2 to prevent further skin breakdown.

## 2019-05-19 NOTE — PROGRESS NOTES
"[facility]  Endocrinology  Progress Note    Admit Date: 4/20/2019     Reason for Consult: Management of T2DM, Hyperglycemia     Surgical Procedure and Date:       04/21/2019:         1. Exploratory laparotomy        2. Ligation of left ureter        3. Removal of left JJ ureteral stent      Diabetes diagnosis year: ANDRE    Home Diabetes Medications:  ANDRE  Toujeo 50 BID  Invokana 300mg daily   Novolog 35 units AM, 45 units PM, 35 units PM    How often checking glucose at home? ANDRE   BG readings on regimen: ANDRE  Hypoglycemia on the regimen?  ANDRE  Missed doses on regimen?  ANDRE    Diabetes Complications include:     Hyperglycemia and Diabetic retinopathy     Complicating diabetes co morbidities:   History of MI and MURTAZA, CAD, HLD    HPI:   Patient is a 59 y.o. female with a diagnosis of HTN, HLN, DM type 2, nonproliferative diabetic renopathy, CAD, NSTEMI, and back pain who presents to the ED with a complaint of altered mental status on 04/20/2019. Is now s/p Exploratory laparotomy, Ligation of left ureter, and Removal of left JJ ureteral stent. Endocrinology consulted to manage DM2/Hyperglycemia.    Lab Results   Component Value Date    HGBA1C 10.8 (H) 02/19/2019       Interval HPI:   Overnight events:   BG is now above goal on current SQ insulin regimen. NAEON.     Eating:   NPO  Nausea: No  Hypoglycemia and intervention: No  Fever: No  TPN and/or TF: Yes TF.   If yes, type of TF/TPN and rate: 40 ml/hr    BP (!) 187/79 (BP Location: Right arm, Patient Position: Lying)   Pulse (!) 126   Temp (!) 100.5 °F (38.1 °C) (Oral)   Resp (!) 28   Ht 5' 5" (1.651 m)   Wt 76 kg (167 lb 8.8 oz)   SpO2 100%   Breastfeeding? No   BMI 27.88 kg/m²      Labs Reviewed and Include    Recent Labs   Lab 05/19/19  0336   *   CALCIUM 8.8   ALBUMIN 1.7*   PROT 5.6*      K 4.1   CO2 23   *   BUN 41*   CREATININE 1.2   ALKPHOS 102   ALT <5*   AST 18   BILITOT 0.5     Lab Results   Component Value Date "    WBC 18.52 (H) 05/19/2019    HGB 6.5 (L) 05/19/2019    HCT 19 (LL) 05/19/2019    MCV 91 05/19/2019     05/19/2019     No results for input(s): TSH, FREET4 in the last 168 hours.  Lab Results   Component Value Date    HGBA1C 10.8 (H) 02/19/2019       Nutritional status:   Body mass index is 27.88 kg/m².  Lab Results   Component Value Date    ALBUMIN 1.7 (L) 05/19/2019    ALBUMIN 1.5 (L) 05/18/2019    ALBUMIN 1.5 (L) 05/17/2019     No results found for: PREALBUMIN    Estimated Creatinine Clearance: 52.1 mL/min (based on SCr of 1.2 mg/dL).    Accu-Checks  Recent Labs     05/18/19  0028 05/18/19  0346 05/18/19  0800 05/18/19  1200 05/18/19  1602 05/18/19  1944 05/19/19  0004 05/19/19  0359 05/19/19  0802 05/19/19  1219   POCTGLUCOSE 215* 202* 164* 185* 203* 294* 237* 190* 208* 210*       Current Medications and/or Treatments Impacting Glycemic Control  Immunotherapy:    Immunosuppressants     None        Steroids:   Hormones (From admission, onward)    None        Pressors:    Autonomic Drugs (From admission, onward)    None        Hyperglycemia/Diabetes Medications:   Antihyperglycemics (From admission, onward)    Start     Stop Route Frequency Ordered    05/18/19 1600  insulin aspart U-100 pen 4 Units      -- SubQ Every 24 hours (non-standard times) 05/17/19 1647    05/18/19 1200  insulin aspart U-100 pen 4 Units      -- SubQ Every 24 hours (non-standard times) 05/17/19 1647    05/18/19 0800  insulin aspart U-100 pen 4 Units      -- SubQ Every 24 hours (non-standard times) 05/17/19 1647    05/18/19 0400  insulin aspart U-100 pen 4 Units      -- SubQ Every 24 hours (non-standard times) 05/17/19 1647    05/18/19 0000  insulin aspart U-100 pen 4 Units      -- SubQ Every 24 hours (non-standard times) 05/17/19 1647    05/17/19 2000  insulin aspart U-100 pen 4 Units      -- SubQ Every 24 hours (non-standard times) 05/17/19 1647    05/10/19 1450  insulin aspart U-100 pen 0-5 Units      -- SubQ Every 4 hours PRN  05/10/19 1351          ASSESSMENT and PLAN    * Ureteral transection of left native kidney  Managed per primary team  Avoid hypoglycemia        Type 2 diabetes mellitus with diabetic peripheral angiopathy without gangrene  BG goal 140 - 180    Increase Novolog 6 units q 4 hrs (persistent correction scale has been needed)  Hold if TFs are stopped.  Low dose correction scale   BG monitoring q 4 hrs.     ** Please notify Endocrine for any change and/or advance in diet/TF**  ** Please call Endocrine for any BG related issues **    Discharge Recommendations:     TBD.             CAD (coronary artery disease)  Managed per primary team  Condition may cause insulin resistance       HLD (hyperlipidemia)  Managed per primary team  Condition may cause insulin resistance         Sepsis  Infection may elevate BG readings    Managed per primary team  Avoid hypoglycemia        Sleep apnea  May affect BG readings if uncontrolled        On enteral nutrition  May cause BG excursions.           Uriah Holder, NP  Endocrinology  [facility]

## 2019-05-19 NOTE — NURSING
At 1345 patient started exhibiting symptoms of increasing anxiety with accompanying low oxygen saturations. Immediately attempted to suction patient for secretions. RT called to bedside to perform PRN breathing treatment (Duoneb). Following treatment, patient continued to exhibit tachypnea and tachycardia. Dr. Espino immediately called to the bedside. RT performed bag lavage with suctioning. Yellow sputum production noted. Patient continued to be tachypnic and working hard to breathe. Chest xray performed. IV lasix given. Patient was taken off ventilator and ambu bagged until sats returned within range. Precedex ordered. Patient anxiety decreased. Patient placed on ventilator on a rate. Will continue to monitor.

## 2019-05-19 NOTE — SUBJECTIVE & OBJECTIVE
Interval History:   Respiratory issues yesterday afternoon and early AM which have improved. Hemoglobin down to 6.5 today. Transfusing 1 U this AM.    Review of Systems  Objective:     Temp:  [98.5 °F (36.9 °C)-100.9 °F (38.3 °C)] 99.9 °F (37.7 °C)  Pulse:  [] 99  Resp:  [14-51] 14  SpO2:  [87 %-100 %] 98 %  BP: ()/(46-84) 162/71     Body mass index is 27.88 kg/m².    Date 05/19/19 0700 - 05/20/19 0659   Shift 3630-6658 3476-0269 1696-6920 24 Hour Total   INTAKE   NG/   370   Shift Total(mL/kg) 370(4.9)   370(4.9)   OUTPUT   Urine(mL/kg/hr) 480   480   Shift Total(mL/kg) 480(6.3)   480(6.3)   Weight (kg) 76 76 76 76     Bladder Scan Volume (mL): 27 mL (05/10/19 0846)  Post Void Cath Residual Output (mL): 27 mL (05/10/19 0846)    Drains     Drain                 Nephrostomy 04/21/19 0629 Left 8 Fr. 28 days         Gastrostomy/Enterostomy 05/02/19 1134 Percutaneous endoscopic gastrostomy (PEG) LUQ decompression;feeding 16 days         Urethral Catheter 05/16/19 1040 16 Fr. 2 days              Physical Exam   Constitutional: She appears well-developed. No distress.   HENT:   Head: Normocephalic and atraumatic.   Neck: No JVD present.   Cardiovascular: Normal rate and regular rhythm.    Pulmonary/Chest: Effort normal. No respiratory distress.   On trach collar   Abdominal: Soft. She exhibits no distension. There is no rebound and no guarding.   Incision c/d/i   G-tube   Genitourinary:   Genitourinary Comments: Fontenot in place draining clear yellow urine  Left neph tube with clear yellow urine   Neurological: She is alert.   Skin: Skin is warm and dry. She is not diaphoretic. No pallor.       Significant Labs:    BMP:  Recent Labs   Lab 05/17/19  0410 05/18/19  0256 05/19/19  0336   * 148* 144   K 4.4 4.3 4.1   * 116* 113*   CO2 23 24 23   BUN 36* 35* 41*   CREATININE 0.8 0.9 1.2   CALCIUM 8.4* 8.7 8.8       CBC:   Recent Labs   Lab 05/17/19  1620 05/18/19  0256 05/19/19  0336 05/19/19  0549    WBC 14.82* 14.37* 18.52*  --    HGB 7.7* 7.5* 6.5*  --    HCT 24.0* 23.3* 20.7* 19*    210 246  --        All pertinent labs results from the past 24 hours have been reviewed.    Significant Imaging:  All pertinent imaging results/findings from the past 24 hours have been reviewed.

## 2019-05-19 NOTE — PROGRESS NOTES
[facility]  Critical Care - Surgery  Progress Note    Patient Name: Mariann Huff  MRN: [id number]  Admission Date: 4/20/2019  Hospital Length of Stay: 29 days  Code Status: Full Code  Attending Provider: Robin Boyd MD  Primary Care Provider: Jasbir Haney MD   Principal Problem: Ureteral transection of left native kidney    Subjective:     Hospital/ICU Course:  The patient has had 2 episodes of bradycardia during the day.  Early in the morning, the patient had difficulty breathing and a mucus plug was found in the inner cannula of the tracheostomy.  She developed bradycardia but never arrested.  She also developed hypotension.  This was followed subsequently after she was bagged (Ambu) with tachypnea and hypertension and tachycardia.  Oxygen saturations were in the low 90s, and the arterial blood gas at that time revealed a significant alveolar arterial gradient with adequate ventilation.  Chest x-ray, which I personally reviewed, revealed what appeared to be a significant fluid overload.  We did perform an echocardiogram urgently, which I personally was present and reviewed the results.  There is no evidence of heart failure.  EKG was reported as normal and there is no evidence that the patient had an acute myocardial event.  The chest x-ray did suggest a significant amount of pulmonary edema. There was also a concern, because the the history of upper body deep venous thrombosis, that this could be a pulmonary embolism.  A CT scan of the chest , which I have personally reviewed show bilateral infiltrates compatible with pulmonary edema of non cardiogenic origin.  It was not possible to rule out a pulmonary embolism.  The patient is anticoagulated currently.  Ultrasound of the lower extremities was done and they do not show any deep venous thrombosis.    The patient did receive some fluids, because of her history of acute kidney injury, which has resolved and because she was receiving IV  contrast.  She has maintained a good urinary output during the day.  She has mostly being hemodynamically stable and her tachycardia resolved.  She remains sedated and on the ventilator.  Follow-up chest x-ray, which I personally reviewed, showed resolving pulmonary edema. Her oxygenation and oxygen saturations improved and we were able to wean the patient FiO2 down to 50%.    Of note, I did perform a bronchoscopy, which failed to reveal any amount of pus or mucus plugging.  There was also no evidence of aspiration.  BAL was performed and was sent.    A 2nd episode of bradycardia was observed in the afternoon.  This responded to the use of atropine and 0.1 mg of epinephrine.  She did have a tachycardic response with this which subsided rapidly.  She remained otherwise hemodynamically stable.  Arterial blood gases, reveal now a PO2 of 300 with adequate pCO2 and pH.  Mild hyperventilation.  She remains comfortable.  I have asked Cardiology to re-evaluate this patient.  For now she remains off pressors.    Neurologically the patient has been intact. She is awake alert and oriented with a nonfocal exam.    Her abdomen remains soft.  She has been NPO for now.  She has not had any further episodes of lower GI bleed.    The patient is having a good urinary output BUN and creatinine have been grossly within normal limits with a slight elevation of BUN.  Electrolytes are being followed and replaced as necessary.    Currently no evidence of infection in this patient.  Empirically I started antibiotics, but will stop them in the next 24-48 hours.    I have had the chance to talk to the patient's  at length and in detail.  I have explained our findings, I will keep him up-to-date as to any progress in understanding the etiology of these episodes and will wait continuing to talk to him as often as necessary.      Interval History/Significant Events: No grossly blood BM overnight, but Hgb fell. Getting PRBC this morning.  Borderline UO. Got 40 lasix early AM. Mild PAPA on AM labs subsequently. Episodes of hypoxia, tachypnea, HTN and tachycardia. Back on PAV+ and more stable.    Follow-up For: Procedure(s) (LRB):  SIGMOIDOSCOPY, FLEXIBLE (N/A)    Post-Operative Day: 6 Days Post-Op    Objective:     Vital Signs (Most Recent):  Temp: 100.3 °F (37.9 °C) (05/19/19 1110)  Pulse: 101 (05/19/19 1110)  Resp: (!) 24 (05/19/19 1110)  BP: (!) 141/64 (05/19/19 1100)  SpO2: 100 % (05/19/19 1110) Vital Signs (24h Range):  Temp:  [98.5 °F (36.9 °C)-100.9 °F (38.3 °C)] 100.3 °F (37.9 °C)  Pulse:  [] 101  Resp:  [14-51] 24  SpO2:  [87 %-100 %] 100 %  BP: ()/(46-84) 141/64  Arterial Line BP: (141-146)/(64-66) 143/66     Weight: 76 kg (167 lb 8.8 oz)  Body mass index is 27.88 kg/m².      Intake/Output Summary (Last 24 hours) at 5/19/2019 1147  Last data filed at 5/19/2019 1100  Gross per 24 hour   Intake 1936 ml   Output 1635 ml   Net 301 ml       Physical Exam   Constitutional: She appears well-developed and well-nourished.   HENT:   Head: Normocephalic and atraumatic.   Tracheostomy in place   Eyes: Right eye exhibits no discharge. Left eye exhibits no discharge.   Neck: Normal range of motion. Neck supple.   Cardiovascular: Normal rate and regular rhythm.   Pulmonary/Chest: Effort normal. No respiratory distress.   Abdominal: Soft.   Midline incision c/d/i.  PEG with no leak. Abdomen soft, non-distended.  Bowel sounds present   Genitourinary:   Genitourinary Comments: Nephrostomy tube in place with thin dark yellow output.  Fontenot in place   Musculoskeletal: Normal range of motion.   Neurological: She is alert.   Able to follow commands, denying pain.    Skin: Skin is warm and dry.   Psychiatric: She has a normal mood and affect.   Nursing note and vitals reviewed.      Vents:  Vent Mode: Spont (05/19/19 0934)  Ventilator Initiated: Yes (05/08/19 0630)  Set Rate: 18 bmp (05/13/19 0742)  Vt Set: 420 mL (05/13/19 0742)  Pressure Support: 0  cmH20 (04/29/19 1014)  PEEP/CPAP: 5 cmH20 (05/19/19 0934)  Oxygen Concentration (%): 51 (05/19/19 1100)  Peak Airway Pressure: 19 cmH2O (05/19/19 0934)  Plateau Pressure: 0 cmH20 (05/19/19 0934)  Total Ve: 9.77 mL (05/19/19 0934)  Negative Inspiratory Force (cm H2O): -18 (04/27/19 1306)  F/VT Ratio<105 (RSBI): (!) 29.11 (05/19/19 0934)    Lines/Drains/Airways     Drain                 Nephrostomy 04/21/19 0629 Left 8 Fr. 28 days         Gastrostomy/Enterostomy 05/02/19 1134 Percutaneous endoscopic gastrostomy (PEG) LUQ decompression;feeding 17 days         Urethral Catheter 05/16/19 1040 16 Fr. 3 days          Airway                 Surgical Airway 05/02/19 1107 Shiley Cuffed 17 days          Peripheral Intravenous Line                 Midline Catheter Insertion/Assessment  - Single Lumen 05/07/19 1632 Left basilic vein (medial side of arm) 18g x 8cm 11 days         Peripheral IV - Single Lumen 05/17/19 1630 20 G;1 3/4 in Right Upper Arm 1 day                Significant Labs:    CBC/Anemia Profile:  Recent Labs   Lab 05/17/19  1620 05/18/19  0256 05/19/19  0336 05/19/19  0549   WBC 14.82* 14.37* 18.52*  --    HGB 7.7* 7.5* 6.5*  --    HCT 24.0* 23.3* 20.7* 19*    210 246  --    MCV 90 89 91  --    RDW 15.3* 15.2* 15.0*  --         Chemistries:  Recent Labs   Lab 05/18/19  0256 05/19/19  0336   * 144   K 4.3 4.1   * 113*   CO2 24 23   BUN 35* 41*   CREATININE 0.9 1.2   CALCIUM 8.7 8.8   ALBUMIN 1.5* 1.7*   PROT 5.4* 5.6*   BILITOT 0.4 0.5   ALKPHOS 96 102   ALT <5* <5*   AST 19 18   MG 1.7 2.0   PHOS 3.0 2.7       All pertinent labs within the past 24 hours have been reviewed.    Significant Imaging:  I have reviewed all pertinent imaging results/findings within the past 24 hours.    Assessment/Plan:     * Ureteral transection of left native kidney  ASSESSMENT/PLAN:   Mariann TRISTIN Huff is a 59 y.o. female s/p left ureteral injury on 4/12, who presented with fever, AMS, and intraabdominal abscess,  taken for washout and ligation of left ureter with nephrostomy tube placement on 4/21. S/p Trach/PEG on 5/2. Episode of negative pressure pulmonary edema on 5/8 requiring mechanical ventilation, diuresis and low dose pressor. Has been plagued by intermittent BRBPR.     Neuro:   - off sedation  - responds to questions appropriately by nodding  - no focal deficit     Pulmonary:   Moved from PAV+ to trach collar on 5/17; back to PAV+ 5/19  - CXR bilateral haziness  - ABGs prn     Cardiac:   - HDS at this time  - did again have some tachycardia associated with HTN that seems related to respiratory distress  - TTE 5/8 with no evidence of right heart strain or significant valvular pathology, normal LV systolic function, EF 70%     Renal:    - S/p transection of left ureter 4/12 and subsequent ligation and PCT nephrostomy tube placement with IR 4/21  - Fontenot removed 5/15, but then replaced for retention   - fluconazole for candiduria  - lasix 40 this AM  - hold on additional lasix in setting of PAPA  - Monitor I/Os       Intake/Output Summary (Last 24 hours) at 5/17/2019 1016  Last data filed at 5/17/2019 0900  Gross per 24 hour   Intake 2129 ml   Output 1760 ml   Net 369 ml        ID:   - mild fevers  - increase in WBC  - Blood 5/10 NGTD  - Ucx 5/13 candiduria  - BAL 5/18 negative  - C diff 5/5 negative  - call ID for abx recs     Hem/Onc:   - H/H fell   - getting 2U PRBC this AM  - check Hgb prn for bleeding     Endocrine:  - DM Type 2  - TF @ goal  - LDSSI  - Endocrine following for assistance with blood glucose control.      Fluids/Electrolytes/Nutrition/GI:   - Free water flushes 300 cc q6h  - Replace electrolytes PRN     # rectal ulcers  - intermittent hematochezia seems to be improving  - anoscopy and hemostatic agent applied by CRS 5/15  - imodium QID     PPx:  - DVT: Lovenox, Hep gtt held for continued bloody BMs        DISPO:  - Continue SICU care  - Preparing for LTACH once bloody BMs are  managed            Critical secondary to Patient has a condition that poses threat to life and bodily function: Severe Respiratory Distress and lower GI bleed     Critical care was time spent personally by me on the following activities: development of treatment plan with patient or surrogate and bedside caregivers, discussions with consultants, evaluation of patient's response to treatment, examination of patient, ordering and performing treatments and interventions, ordering and review of laboratory studies, ordering and review of radiographic studies, pulse oximetry, re-evaluation of patient's condition.  This critical care time did not overlap with that of any other provider or involve time for any procedures.     ISABELLE Carter MD  Critical Care - Surgery  [facility]

## 2019-05-19 NOTE — PROGRESS NOTES
"Patient started to exhibit symptoms of anxiety and general "wheezing" around trach cuff as described in previous charting. Duoneb was given but did little to solve the general anxiety. Patient became tachypneic and tachycardic. Bag lavage and suctioning was done and a large amount of yellow sputum was suctioned as well as several yellow small plugs. Patient continued to exhibit distress so patient was taken off vent and bagged. Srinivas ORTEGA was called and reported to bedside where patient was reassessed. CXR was called and patient was placed on ARDSnet protocol via Srinivas ORTEGA. Patient was placed on AC/VC with documented settings. Possible cuff leak was discussed, but ultimately ruled out due to consistent VT and PIP within proper ranges. A longer trach may necessary for the patient. Patient seemed to relax on ARDSnet settings and the "leak" was determined to be non-emergent but needed to be assessed. Will continue to monitor.    "

## 2019-05-19 NOTE — PROGRESS NOTES
[facility]  Colorectal Surgery  Progress Note    Patient Name: Mariann Huff  MRN: [id number]  Admission Date: 4/20/2019  Hospital Length of Stay: 29 days  Attending Physician: Robin Boyd MD    Subjective:     Interval History: small bloody BM yesterday but otherwise brown stool, H/H slowly drifted down, transfusing 1uPRBCs now    Post-Op Info:  Procedure(s) (LRB):  SIGMOIDOSCOPY, FLEXIBLE (N/A)   6 Days Post-Op      Medications:  Continuous Infusions:  Scheduled Meds:   carvedilol  3.125 mg Per G Tube BID    ceFAZolin (ANCEF) IVPB  2 g Intravenous Q8H    chlorhexidine  15 mL Mouth/Throat BID    fluconazole 40 mg/ml  200 mg Per G Tube Daily    insulin aspart U-100  4 Units Subcutaneous Q24H    insulin aspart U-100  4 Units Subcutaneous Q24H    insulin aspart U-100  4 Units Subcutaneous Q24H    insulin aspart U-100  4 Units Subcutaneous Q24H    insulin aspart U-100  4 Units Subcutaneous Q24H    insulin aspart U-100  4 Units Subcutaneous Q24H    loperamide  2 mg Per G Tube QID    miconazole nitrate 2%   Topical (Top) BID    pantoprazole  40 mg Per G Tube Daily    rifAMpin (RIFADIN) IVPB  300 mg Intravenous Q12H    silodosin  4 mg Per G Tube Daily     PRN Meds:   sodium chloride    acetaminophen    albuterol-ipratropium    dextrose 50%    glucagon (human recombinant)    hydrALAZINE    HYDROmorphone    hydrOXYzine    insulin aspart U-100    labetalol    magnesium sulfate IVPB    magnesium sulfate IVPB    ondansetron    potassium chloride in water    promethazine (PHENERGAN) IVPB    sodium chloride 0.9%        Objective:     Vital Signs (Most Recent):  Temp: 99.9 °F (37.7 °C) (05/19/19 0713)  Pulse: 99 (05/19/19 0934)  Resp: 14 (05/19/19 0934)  BP: (!) 162/71 (05/19/19 0900)  SpO2: 98 % (05/19/19 0934) Vital Signs (24h Range):  Temp:  [98.5 °F (36.9 °C)-100.9 °F (38.3 °C)] 99.9 °F (37.7 °C)  Pulse:  [] 99  Resp:  [14-51] 14  SpO2:  [87 %-100 %] 98 %  BP:  ()/(46-84) 162/71     Intake/Output - Last 3 Shifts       05/17 0700 - 05/18 0659 05/18 0700 - 05/19 0659 05/19 0700 - 05/20 0659    I.V. (mL/kg) 195 (2.6) 211 (2.8)     Blood       NG/GT 1965 1460 370    IV Piggyback       Total Intake(mL/kg) 2160 (28.4) 1671 (22) 370 (4.9)    Urine (mL/kg/hr) 1655 (0.9) 1300 (0.7) 480 (2.2)    Stool 0      Total Output 1655 1300 480    Net +505 +371 -110           Stool Occurrence 2 x            Physical Exam   Constitutional: She is oriented to person, place, and time. No distress.   Eyes: EOM are normal.   Neck: Neck supple.   Cardiovascular: Normal rate and regular rhythm.   Pulmonary/Chest: Effort normal. No respiratory distress.   Abdominal: Soft. She exhibits no distension. There is no tenderness.   Musculoskeletal: Normal range of motion.   Neurological: She is alert and oriented to person, place, and time.   Skin: Skin is warm and dry.       Significant Labs:  BMP (Last 3 Results):   Recent Labs   Lab 05/17/19  0410 05/18/19  0256 05/19/19  0336   * 174* 175*   * 148* 144   K 4.4 4.3 4.1   * 116* 113*   CO2 23 24 23   BUN 36* 35* 41*   CREATININE 0.8 0.9 1.2   CALCIUM 8.4* 8.7 8.8   MG 1.8 1.7 2.0     CBC (Last 3 Results):   Recent Labs   Lab 05/17/19  1620 05/18/19  0256 05/19/19  0336 05/19/19  0549   WBC 14.82* 14.37* 18.52*  --    RBC 2.67* 2.63* 2.28*  --    HGB 7.7* 7.5* 6.5*  --    HCT 24.0* 23.3* 20.7* 19*    210 246  --    MCV 90 89 91  --    MCH 28.8 28.5 28.5  --    MCHC 32.1 32.2 31.4*  --        Significant Diagnostics:  None    Assessment/Plan:     BRBPR (bright red blood per rectum)  Ms. Huff is a 60 yo F with PMH of HTN, DM II, CAD, NSTEMI, s/p L5-S1 OLIF with NSGY 4/12 c/b intraoperative left ureteral injury and underwent ureteroureteral anastomoses and ureteral stent placement complicated by sepsis due to E.coli septicemia now s/p ex lap on 4/21 and PEG/Trach of the vent now having BRBPR.     S/p flex sig on 5/13  Placed  hemablast in rectum on 5/15  Imodium QID to help with diarrhea  Transfuse as needed, cont to trend H/H  cont to hold hep gtt  Please call with any questions or concerns               John Barker MD  Colorectal Surgery  [facility]

## 2019-05-19 NOTE — SUBJECTIVE & OBJECTIVE
Interval History/Significant Events: No grossly blood BM overnight, but Hgb fell. Getting PRBC this morning. Borderline UO. Got 40 lasix early AM. Mild PAPA on AM labs subsequently. Episodes of hypoxia, tachypnea, HTN and tachycardia. Back on PAV+ and more stable.    Follow-up For: Procedure(s) (LRB):  SIGMOIDOSCOPY, FLEXIBLE (N/A)    Post-Operative Day: 6 Days Post-Op    Objective:     Vital Signs (Most Recent):  Temp: 100.3 °F (37.9 °C) (05/19/19 1110)  Pulse: 101 (05/19/19 1110)  Resp: (!) 24 (05/19/19 1110)  BP: (!) 141/64 (05/19/19 1100)  SpO2: 100 % (05/19/19 1110) Vital Signs (24h Range):  Temp:  [98.5 °F (36.9 °C)-100.9 °F (38.3 °C)] 100.3 °F (37.9 °C)  Pulse:  [] 101  Resp:  [14-51] 24  SpO2:  [87 %-100 %] 100 %  BP: ()/(46-84) 141/64  Arterial Line BP: (141-146)/(64-66) 143/66     Weight: 76 kg (167 lb 8.8 oz)  Body mass index is 27.88 kg/m².      Intake/Output Summary (Last 24 hours) at 5/19/2019 1147  Last data filed at 5/19/2019 1100  Gross per 24 hour   Intake 1936 ml   Output 1635 ml   Net 301 ml       Physical Exam   Constitutional: She appears well-developed and well-nourished.   HENT:   Head: Normocephalic and atraumatic.   Tracheostomy in place   Eyes: Right eye exhibits no discharge. Left eye exhibits no discharge.   Neck: Normal range of motion. Neck supple.   Cardiovascular: Normal rate and regular rhythm.   Pulmonary/Chest: Effort normal. No respiratory distress.   Abdominal: Soft.   Midline incision c/d/i.  PEG with no leak. Abdomen soft, non-distended.  Bowel sounds present   Genitourinary:   Genitourinary Comments: Nephrostomy tube in place with thin dark yellow output.  Fontenot in place   Musculoskeletal: Normal range of motion.   Neurological: She is alert.   Able to follow commands, denying pain.    Skin: Skin is warm and dry.   Psychiatric: She has a normal mood and affect.   Nursing note and vitals reviewed.      Vents:  Vent Mode: Spont (05/19/19 0934)  Ventilator Initiated:  Yes (05/08/19 0630)  Set Rate: 18 bmp (05/13/19 0742)  Vt Set: 420 mL (05/13/19 0742)  Pressure Support: 0 cmH20 (04/29/19 1014)  PEEP/CPAP: 5 cmH20 (05/19/19 0934)  Oxygen Concentration (%): 51 (05/19/19 1100)  Peak Airway Pressure: 19 cmH2O (05/19/19 0934)  Plateau Pressure: 0 cmH20 (05/19/19 0934)  Total Ve: 9.77 mL (05/19/19 0934)  Negative Inspiratory Force (cm H2O): -18 (04/27/19 1306)  F/VT Ratio<105 (RSBI): (!) 29.11 (05/19/19 0934)    Lines/Drains/Airways     Drain                 Nephrostomy 04/21/19 0629 Left 8 Fr. 28 days         Gastrostomy/Enterostomy 05/02/19 1134 Percutaneous endoscopic gastrostomy (PEG) LUQ decompression;feeding 17 days         Urethral Catheter 05/16/19 1040 16 Fr. 3 days          Airway                 Surgical Airway 05/02/19 1107 Shiley Cuffed 17 days          Peripheral Intravenous Line                 Midline Catheter Insertion/Assessment  - Single Lumen 05/07/19 1632 Left basilic vein (medial side of arm) 18g x 8cm 11 days         Peripheral IV - Single Lumen 05/17/19 1630 20 G;1 3/4 in Right Upper Arm 1 day                Significant Labs:    CBC/Anemia Profile:  Recent Labs   Lab 05/17/19  1620 05/18/19  0256 05/19/19  0336 05/19/19  0549   WBC 14.82* 14.37* 18.52*  --    HGB 7.7* 7.5* 6.5*  --    HCT 24.0* 23.3* 20.7* 19*    210 246  --    MCV 90 89 91  --    RDW 15.3* 15.2* 15.0*  --         Chemistries:  Recent Labs   Lab 05/18/19  0256 05/19/19  0336   * 144   K 4.3 4.1   * 113*   CO2 24 23   BUN 35* 41*   CREATININE 0.9 1.2   CALCIUM 8.7 8.8   ALBUMIN 1.5* 1.7*   PROT 5.4* 5.6*   BILITOT 0.4 0.5   ALKPHOS 96 102   ALT <5* <5*   AST 19 18   MG 1.7 2.0   PHOS 3.0 2.7       All pertinent labs within the past 24 hours have been reviewed.    Significant Imaging:  I have reviewed all pertinent imaging results/findings within the past 24 hours.

## 2019-05-19 NOTE — PROGRESS NOTES
After bathing and cleaning the pt, she became tachypneic with increased work of breathing. Respirations noted to be 35-40, O2 sats 89-91%, -120. Pt immediately given 100% bolus of O2 and suctioned. Secretions noted to be very thick, with very course lung sounds. Pt then popped off vent and lavaged with saline and ambu bag per RT. Then given PRN breathing treatment. MD notified of situation. TORBV for ABG, Lasix and chest x ray obtained. Pt sats returned mid 90's, work of breathing has improved. Will continue to monitor.

## 2019-05-19 NOTE — SUBJECTIVE & OBJECTIVE
"Interval HPI:   Overnight events:   BG is now above goal on current SQ insulin regimen. NAEON.     Eating:   NPO  Nausea: No  Hypoglycemia and intervention: No  Fever: No  TPN and/or TF: Yes TF.   If yes, type of TF/TPN and rate: 40 ml/hr    BP (!) 187/79 (BP Location: Right arm, Patient Position: Lying)   Pulse (!) 126   Temp (!) 100.5 °F (38.1 °C) (Oral)   Resp (!) 28   Ht 5' 5" (1.651 m)   Wt 76 kg (167 lb 8.8 oz)   SpO2 100%   Breastfeeding? No   BMI 27.88 kg/m²     Labs Reviewed and Include    Recent Labs   Lab 05/19/19  0336   *   CALCIUM 8.8   ALBUMIN 1.7*   PROT 5.6*      K 4.1   CO2 23   *   BUN 41*   CREATININE 1.2   ALKPHOS 102   ALT <5*   AST 18   BILITOT 0.5     Lab Results   Component Value Date    WBC 18.52 (H) 05/19/2019    HGB 6.5 (L) 05/19/2019    HCT 19 (LL) 05/19/2019    MCV 91 05/19/2019     05/19/2019     No results for input(s): TSH, FREET4 in the last 168 hours.  Lab Results   Component Value Date    HGBA1C 10.8 (H) 02/19/2019       Nutritional status:   Body mass index is 27.88 kg/m².  Lab Results   Component Value Date    ALBUMIN 1.7 (L) 05/19/2019    ALBUMIN 1.5 (L) 05/18/2019    ALBUMIN 1.5 (L) 05/17/2019     No results found for: PREALBUMIN    Estimated Creatinine Clearance: 52.1 mL/min (based on SCr of 1.2 mg/dL).    Accu-Checks  Recent Labs     05/18/19  0028 05/18/19  0346 05/18/19  0800 05/18/19  1200 05/18/19  1602 05/18/19  1944 05/19/19  0004 05/19/19  0359 05/19/19  0802 05/19/19  1219   POCTGLUCOSE 215* 202* 164* 185* 203* 294* 237* 190* 208* 210*       Current Medications and/or Treatments Impacting Glycemic Control  Immunotherapy:    Immunosuppressants     None        Steroids:   Hormones (From admission, onward)    None        Pressors:    Autonomic Drugs (From admission, onward)    None        Hyperglycemia/Diabetes Medications:   Antihyperglycemics (From admission, onward)    Start     Stop Route Frequency Ordered    05/18/19 1600  insulin " aspart U-100 pen 4 Units      -- SubQ Every 24 hours (non-standard times) 05/17/19 1647    05/18/19 1200  insulin aspart U-100 pen 4 Units      -- SubQ Every 24 hours (non-standard times) 05/17/19 1647    05/18/19 0800  insulin aspart U-100 pen 4 Units      -- SubQ Every 24 hours (non-standard times) 05/17/19 1647    05/18/19 0400  insulin aspart U-100 pen 4 Units      -- SubQ Every 24 hours (non-standard times) 05/17/19 1647    05/18/19 0000  insulin aspart U-100 pen 4 Units      -- SubQ Every 24 hours (non-standard times) 05/17/19 1647    05/17/19 2000  insulin aspart U-100 pen 4 Units      -- SubQ Every 24 hours (non-standard times) 05/17/19 1647    05/10/19 1450  insulin aspart U-100 pen 0-5 Units      -- SubQ Every 4 hours PRN 05/10/19 1357

## 2019-05-19 NOTE — PLAN OF CARE
Problem: Fall Injury Risk  Goal: Absence of Fall and Fall-Related Injury  Outcome: Ongoing (interventions implemented as appropriate)  .      Problem: Diabetes Comorbidity  Goal: Blood Glucose Level Within Desired Range  Outcome: Ongoing (interventions implemented as appropriate)  .      Problem: Infection  Goal: Infection Symptom Resolution  Outcome: Ongoing (interventions implemented as appropriate)  .      Problem: Bleeding (Gastrointestinal Bleeding)  Goal: Hemostasis  Outcome: Ongoing (interventions implemented as appropriate)  .

## 2019-05-19 NOTE — ASSESSMENT & PLAN NOTE
Mariann Huff is a 59 y.o. female s/p left ureteral injury on 4/12, presented with fever, AMS, and intraabdominal abscess, taken for washout and ligation of left ureter with neph tube placement on 4/21, Trach/PEG 5/2.  - back on vent this AM  - Tube feeds per SICU  - Ancef, Rifampin per ID recs   - 5/13 UCx growing Candida albicans; started course of Diflucan  - Maintain neph tube and Fontenot  - Urine output adequate, Cr stable up to 1.3  - diuresis/fluids per SICU  - bloody BMs improving    Dispo: continue ICU care; plan for transfer to LTAC once stable.

## 2019-05-19 NOTE — PROGRESS NOTES
[facility]  Urology  Progress Note    Patient Name: Mariann Huff  MRN: [id number]  Admission Date: 4/20/2019  Hospital Length of Stay: 29 days  Code Status: Full Code   Attending Provider: Robin Boyd MD   Primary Care Physician: Jasbir Haney MD    Subjective:     HPI:  Mariann Huff is a 59 y.o. female with history of HTN, type 2 diabetes mellitus, CAD, NSTEMI, and back pain who presents to [facility] with altered mental status and sepsis. She underwent L5-S1 OLIF with NSGY on 4/12 and had intraoperative left ureteral injury with ureteroureteral anastomosis and ureteral stent placement. She did well initially and had correa and MADHURI drain removed on 4/16. She began having chills and altered mental status 2 days ago and this has progressively worsened. No complaints of pain.     She is altered and HPI is limited. In the ED she is febrile to 103 and tachycardic and pressures are low normal. WBC is 5, creatinine is 3.6 baseline 1.0, lactate is 4.6. Cath UA concerning for infection, 3+ LE, >100 WBCs, and many bacteria on microscopy.    CT and MRI abdomen both show air with minimal fluid near the surgical site with left ureter coursing through. There is air throughout the proximal collecting system which is decompressed with JJ ureteral stent in good position.    Taken for ex lap, ligation of left ureter and left neph tube placement on 4/21/19.    Interval History:   Respiratory issues yesterday afternoon and early AM which have improved. Hemoglobin down to 6.5 today. Transfusing 1 U this AM.    Review of Systems  Objective:     Temp:  [98.5 °F (36.9 °C)-100.9 °F (38.3 °C)] 99.9 °F (37.7 °C)  Pulse:  [] 99  Resp:  [14-51] 14  SpO2:  [87 %-100 %] 98 %  BP: ()/(46-84) 162/71     Body mass index is 27.88 kg/m².    Date 05/19/19 0700 - 05/20/19 0659   Shift 2975-5176 8331-0744 6558-0766 24 Hour Total   INTAKE   NG/   370   Shift Total(mL/kg) 370(4.9)   370(4.9)   OUTPUT   Urine(mL/kg/hr) 480    480   Shift Total(mL/kg) 480(6.3)   480(6.3)   Weight (kg) 76 76 76 76     Bladder Scan Volume (mL): 27 mL (05/10/19 0846)  Post Void Cath Residual Output (mL): 27 mL (05/10/19 0846)    Drains     Drain                 Nephrostomy 04/21/19 0629 Left 8 Fr. 28 days         Gastrostomy/Enterostomy 05/02/19 1134 Percutaneous endoscopic gastrostomy (PEG) LUQ decompression;feeding 16 days         Urethral Catheter 05/16/19 1040 16 Fr. 2 days              Physical Exam   Constitutional: She appears well-developed. No distress.   HENT:   Head: Normocephalic and atraumatic.   Neck: No JVD present.   Cardiovascular: Normal rate and regular rhythm.    Pulmonary/Chest: Effort normal. No respiratory distress.   Trach in place, on vent  Abdominal: Soft. She exhibits no distension. There is no rebound and no guarding.   Incision c/d/i   G-tube   Genitourinary:   Genitourinary Comments: Fontenot in place draining clear yellow urine  Left neph tube with clear yellow urine   Neurological: She is alert.   Skin: Skin is warm and dry. She is not diaphoretic. No pallor.       Significant Labs:    BMP:  Recent Labs   Lab 05/17/19  0410 05/18/19  0256 05/19/19  0336   * 148* 144   K 4.4 4.3 4.1   * 116* 113*   CO2 23 24 23   BUN 36* 35* 41*   CREATININE 0.8 0.9 1.2   CALCIUM 8.4* 8.7 8.8       CBC:   Recent Labs   Lab 05/17/19  1620 05/18/19  0256 05/19/19  0336 05/19/19  0549   WBC 14.82* 14.37* 18.52*  --    HGB 7.7* 7.5* 6.5*  --    HCT 24.0* 23.3* 20.7* 19*    210 246  --        All pertinent labs results from the past 24 hours have been reviewed.    Significant Imaging:  All pertinent imaging results/findings from the past 24 hours have been reviewed.                  Assessment/Plan:     * Ureteral transection of left native kidney  Mariann TRISTIN Huff is a 59 y.o. female s/p left ureteral injury on 4/12, presented with fever, AMS, and intraabdominal abscess, taken for washout and ligation of left ureter with neph tube  placement on 4/21, Trach/PEG 5/2.  - back on vent this AM  - Tube feeds per SICU  - Ancef, Rifampin per ID recs   - 5/13 UCx growing Candida albicans; started course of Diflucan  - Maintain neph tube and Fontenot  - Urine output adequate, Cr stable up to 1.3  - diuresis/fluids per SICU  - bloody BMs improving    Dispo: continue ICU care; plan for transfer to LTAC once stable.    Sepsis  As above        VTE Risk Mitigation (From admission, onward)        Ordered     IP VTE LOW RISK PATIENT  Once      05/08/19 1315     Place sequential compression device  Until discontinued      04/20/19 3781          Hugh Higuera MD  Urology  [facility]

## 2019-05-19 NOTE — NURSING
MD made aware of patient's marginal UOP and 500 cc from nephrostomy tube. No orders given at this time. Will continue to monitor.

## 2019-05-20 ENCOUNTER — ANESTHESIA EVENT (OUTPATIENT)
Dept: ENDOSCOPY | Facility: HOSPITAL | Age: 59
DRG: 003 | End: 2019-05-20
Payer: MEDICARE

## 2019-05-20 LAB
ABO + RH BLD: NORMAL
ALBUMIN SERPL BCP-MCNC: 1.4 G/DL (ref 3.5–5.2)
ALBUMIN SERPL BCP-MCNC: 1.6 G/DL (ref 3.5–5.2)
ALLENS TEST: ABNORMAL
ALP SERPL-CCNC: 122 U/L (ref 55–135)
ALT SERPL W/O P-5'-P-CCNC: 6 U/L (ref 10–44)
ANION GAP SERPL CALC-SCNC: 5 MMOL/L (ref 8–16)
ANION GAP SERPL CALC-SCNC: 9 MMOL/L (ref 8–16)
ANISOCYTOSIS BLD QL SMEAR: SLIGHT
ANISOCYTOSIS BLD QL SMEAR: SLIGHT
AST SERPL-CCNC: 24 U/L (ref 10–40)
BASOPHILS # BLD AUTO: 0.07 K/UL (ref 0–0.2)
BASOPHILS # BLD AUTO: 0.08 K/UL (ref 0–0.2)
BASOPHILS # BLD AUTO: 0.1 K/UL (ref 0–0.2)
BASOPHILS # BLD AUTO: 0.11 K/UL (ref 0–0.2)
BASOPHILS NFR BLD: 0.3 % (ref 0–1.9)
BASOPHILS NFR BLD: 0.4 % (ref 0–1.9)
BILIRUB SERPL-MCNC: 0.8 MG/DL (ref 0.1–1)
BLD GP AB SCN CELLS X3 SERPL QL: NORMAL
BLD PROD TYP BPU: NORMAL
BLD PROD TYP BPU: NORMAL
BLOOD UNIT EXPIRATION DATE: NORMAL
BLOOD UNIT EXPIRATION DATE: NORMAL
BLOOD UNIT TYPE CODE: 5100
BLOOD UNIT TYPE CODE: 5100
BLOOD UNIT TYPE: NORMAL
BLOOD UNIT TYPE: NORMAL
BUN SERPL-MCNC: 43 MG/DL (ref 6–20)
BUN SERPL-MCNC: 45 MG/DL (ref 6–20)
CALCIUM SERPL-MCNC: 8.3 MG/DL (ref 8.7–10.5)
CALCIUM SERPL-MCNC: 8.5 MG/DL (ref 8.7–10.5)
CHLORIDE SERPL-SCNC: 111 MMOL/L (ref 95–110)
CHLORIDE SERPL-SCNC: 114 MMOL/L (ref 95–110)
CO2 SERPL-SCNC: 20 MMOL/L (ref 23–29)
CO2 SERPL-SCNC: 22 MMOL/L (ref 23–29)
CODING SYSTEM: NORMAL
CODING SYSTEM: NORMAL
CREAT SERPL-MCNC: 1 MG/DL (ref 0.5–1.4)
CREAT SERPL-MCNC: 1 MG/DL (ref 0.5–1.4)
DELSYS: ABNORMAL
DIFFERENTIAL METHOD: ABNORMAL
DISPENSE STATUS: NORMAL
DISPENSE STATUS: NORMAL
EOSINOPHIL # BLD AUTO: 0.7 K/UL (ref 0–0.5)
EOSINOPHIL # BLD AUTO: 0.7 K/UL (ref 0–0.5)
EOSINOPHIL # BLD AUTO: 0.8 K/UL (ref 0–0.5)
EOSINOPHIL # BLD AUTO: 1 K/UL (ref 0–0.5)
EOSINOPHIL NFR BLD: 2.8 % (ref 0–8)
EOSINOPHIL NFR BLD: 2.9 % (ref 0–8)
EOSINOPHIL NFR BLD: 3.8 % (ref 0–8)
EOSINOPHIL NFR BLD: 4.5 % (ref 0–8)
ERYTHROCYTE [DISTWIDTH] IN BLOOD BY AUTOMATED COUNT: 14.7 % (ref 11.5–14.5)
ERYTHROCYTE [DISTWIDTH] IN BLOOD BY AUTOMATED COUNT: 14.9 % (ref 11.5–14.5)
ERYTHROCYTE [DISTWIDTH] IN BLOOD BY AUTOMATED COUNT: 15 % (ref 11.5–14.5)
ERYTHROCYTE [DISTWIDTH] IN BLOOD BY AUTOMATED COUNT: 15 % (ref 11.5–14.5)
ERYTHROCYTE [SEDIMENTATION RATE] IN BLOOD BY WESTERGREN METHOD: 20 MM/H
EST. GFR  (AFRICAN AMERICAN): >60 ML/MIN/1.73 M^2
EST. GFR  (AFRICAN AMERICAN): >60 ML/MIN/1.73 M^2
EST. GFR  (NON AFRICAN AMERICAN): >60 ML/MIN/1.73 M^2
EST. GFR  (NON AFRICAN AMERICAN): >60 ML/MIN/1.73 M^2
FIO2: 60
GLUCOSE SERPL-MCNC: 245 MG/DL (ref 70–110)
GLUCOSE SERPL-MCNC: 275 MG/DL (ref 70–110)
HCO3 UR-SCNC: 23.9 MMOL/L (ref 24–28)
HCT VFR BLD AUTO: 20.7 % (ref 37–48.5)
HCT VFR BLD AUTO: 23 % (ref 37–48.5)
HCT VFR BLD AUTO: 23.3 % (ref 37–48.5)
HCT VFR BLD AUTO: 25.5 % (ref 37–48.5)
HGB BLD-MCNC: 6.7 G/DL (ref 12–16)
HGB BLD-MCNC: 7.5 G/DL (ref 12–16)
HGB BLD-MCNC: 7.7 G/DL (ref 12–16)
HGB BLD-MCNC: 8.3 G/DL (ref 12–16)
HYPOCHROMIA BLD QL SMEAR: ABNORMAL
HYPOCHROMIA BLD QL SMEAR: ABNORMAL
IMM GRANULOCYTES # BLD AUTO: 0.18 K/UL (ref 0–0.04)
IMM GRANULOCYTES # BLD AUTO: 0.2 K/UL (ref 0–0.04)
IMM GRANULOCYTES # BLD AUTO: 0.2 K/UL (ref 0–0.04)
IMM GRANULOCYTES # BLD AUTO: 0.23 K/UL (ref 0–0.04)
IMM GRANULOCYTES NFR BLD AUTO: 0.8 % (ref 0–0.5)
IMM GRANULOCYTES NFR BLD AUTO: 0.9 % (ref 0–0.5)
INR PPP: 1 (ref 0.8–1.2)
LYMPHOCYTES # BLD AUTO: 2.1 K/UL (ref 1–4.8)
LYMPHOCYTES # BLD AUTO: 2.2 K/UL (ref 1–4.8)
LYMPHOCYTES NFR BLD: 9 % (ref 18–48)
LYMPHOCYTES NFR BLD: 9.4 % (ref 18–48)
LYMPHOCYTES NFR BLD: 9.5 % (ref 18–48)
LYMPHOCYTES NFR BLD: 9.6 % (ref 18–48)
MAGNESIUM SERPL-MCNC: 1.5 MG/DL (ref 1.6–2.6)
MAGNESIUM SERPL-MCNC: 1.8 MG/DL (ref 1.6–2.6)
MCH RBC QN AUTO: 28.9 PG (ref 27–31)
MCH RBC QN AUTO: 29.1 PG (ref 27–31)
MCH RBC QN AUTO: 29.4 PG (ref 27–31)
MCH RBC QN AUTO: 29.4 PG (ref 27–31)
MCHC RBC AUTO-ENTMCNC: 32.4 G/DL (ref 32–36)
MCHC RBC AUTO-ENTMCNC: 32.5 G/DL (ref 32–36)
MCHC RBC AUTO-ENTMCNC: 32.6 G/DL (ref 32–36)
MCHC RBC AUTO-ENTMCNC: 33 G/DL (ref 32–36)
MCV RBC AUTO: 88 FL (ref 82–98)
MCV RBC AUTO: 89 FL (ref 82–98)
MCV RBC AUTO: 90 FL (ref 82–98)
MCV RBC AUTO: 90 FL (ref 82–98)
MIN VOL: 9.15
MODE: ABNORMAL
MONOCYTES # BLD AUTO: 1.3 K/UL (ref 0.3–1)
MONOCYTES # BLD AUTO: 1.4 K/UL (ref 0.3–1)
MONOCYTES NFR BLD: 5.2 % (ref 4–15)
MONOCYTES NFR BLD: 5.7 % (ref 4–15)
MONOCYTES NFR BLD: 6.1 % (ref 4–15)
MONOCYTES NFR BLD: 6.2 % (ref 4–15)
NEUTROPHILS # BLD AUTO: 17.3 K/UL (ref 1.8–7.7)
NEUTROPHILS # BLD AUTO: 17.8 K/UL (ref 1.8–7.7)
NEUTROPHILS # BLD AUTO: 18.7 K/UL (ref 1.8–7.7)
NEUTROPHILS # BLD AUTO: 20 K/UL (ref 1.8–7.7)
NEUTROPHILS NFR BLD: 78.5 % (ref 38–73)
NEUTROPHILS NFR BLD: 79.4 % (ref 38–73)
NEUTROPHILS NFR BLD: 80.7 % (ref 38–73)
NEUTROPHILS NFR BLD: 81.7 % (ref 38–73)
NRBC BLD-RTO: 0 /100 WBC
OVALOCYTES BLD QL SMEAR: ABNORMAL
OVALOCYTES BLD QL SMEAR: ABNORMAL
PCO2 BLDA: 35.9 MMHG (ref 35–45)
PEEP: 10
PH SMN: 7.43 [PH] (ref 7.35–7.45)
PHOSPHATE SERPL-MCNC: 2.4 MG/DL (ref 2.7–4.5)
PHOSPHATE SERPL-MCNC: 2.4 MG/DL (ref 2.7–4.5)
PIP: 32
PLATELET # BLD AUTO: 210 K/UL (ref 150–350)
PLATELET # BLD AUTO: 212 K/UL (ref 150–350)
PLATELET # BLD AUTO: 214 K/UL (ref 150–350)
PLATELET # BLD AUTO: 215 K/UL (ref 150–350)
PLATELET BLD QL SMEAR: ABNORMAL
PMV BLD AUTO: 11 FL (ref 9.2–12.9)
PMV BLD AUTO: 11.2 FL (ref 9.2–12.9)
PMV BLD AUTO: 11.4 FL (ref 9.2–12.9)
PMV BLD AUTO: 11.5 FL (ref 9.2–12.9)
PO2 BLDA: 179 MMHG (ref 80–100)
POC BE: 0 MMOL/L
POC SATURATED O2: 100 % (ref 95–100)
POC TCO2: 25 MMOL/L (ref 23–27)
POCT GLUCOSE: 132 MG/DL (ref 70–110)
POCT GLUCOSE: 134 MG/DL (ref 70–110)
POCT GLUCOSE: 196 MG/DL (ref 70–110)
POCT GLUCOSE: 258 MG/DL (ref 70–110)
POCT GLUCOSE: 279 MG/DL (ref 70–110)
POCT GLUCOSE: 282 MG/DL (ref 70–110)
POIKILOCYTOSIS BLD QL SMEAR: SLIGHT
POLYCHROMASIA BLD QL SMEAR: ABNORMAL
POLYCHROMASIA BLD QL SMEAR: ABNORMAL
POTASSIUM SERPL-SCNC: 3.7 MMOL/L (ref 3.5–5.1)
POTASSIUM SERPL-SCNC: 5.1 MMOL/L (ref 3.5–5.1)
PROCALCITONIN SERPL IA-MCNC: 20.62 NG/ML
PROT SERPL-MCNC: 5.8 G/DL (ref 6–8.4)
PROTHROMBIN TIME: 10.8 SEC (ref 9–12.5)
RBC # BLD AUTO: 2.32 M/UL (ref 4–5.4)
RBC # BLD AUTO: 2.55 M/UL (ref 4–5.4)
RBC # BLD AUTO: 2.65 M/UL (ref 4–5.4)
RBC # BLD AUTO: 2.82 M/UL (ref 4–5.4)
SAMPLE: ABNORMAL
SITE: ABNORMAL
SODIUM SERPL-SCNC: 140 MMOL/L (ref 136–145)
SODIUM SERPL-SCNC: 141 MMOL/L (ref 136–145)
SP02: 99
TRANS ERYTHROCYTES VOL PATIENT: NORMAL ML
TRANS ERYTHROCYTES VOL PATIENT: NORMAL ML
VT: 400
WBC # BLD AUTO: 21.75 K/UL (ref 3.9–12.7)
WBC # BLD AUTO: 22.62 K/UL (ref 3.9–12.7)
WBC # BLD AUTO: 23.19 K/UL (ref 3.9–12.7)
WBC # BLD AUTO: 24.57 K/UL (ref 3.9–12.7)

## 2019-05-20 PROCEDURE — 25000003 PHARM REV CODE 250: Performed by: STUDENT IN AN ORGANIZED HEALTH CARE EDUCATION/TRAINING PROGRAM

## 2019-05-20 PROCEDURE — P9021 RED BLOOD CELLS UNIT: HCPCS

## 2019-05-20 PROCEDURE — 99233 PR SUBSEQUENT HOSPITAL CARE,LEVL III: ICD-10-PCS | Mod: ,,, | Performed by: SURGERY

## 2019-05-20 PROCEDURE — 99232 SBSQ HOSP IP/OBS MODERATE 35: CPT | Mod: ,,, | Performed by: NURSE PRACTITIONER

## 2019-05-20 PROCEDURE — 83735 ASSAY OF MAGNESIUM: CPT | Mod: 91

## 2019-05-20 PROCEDURE — 94003 VENT MGMT INPAT SUBQ DAY: CPT

## 2019-05-20 PROCEDURE — 87040 BLOOD CULTURE FOR BACTERIA: CPT

## 2019-05-20 PROCEDURE — 99233 SBSQ HOSP IP/OBS HIGH 50: CPT | Mod: ,,, | Performed by: SURGERY

## 2019-05-20 PROCEDURE — 63600175 PHARM REV CODE 636 W HCPCS: Performed by: STUDENT IN AN ORGANIZED HEALTH CARE EDUCATION/TRAINING PROGRAM

## 2019-05-20 PROCEDURE — 80069 RENAL FUNCTION PANEL: CPT

## 2019-05-20 PROCEDURE — 84145 PROCALCITONIN (PCT): CPT

## 2019-05-20 PROCEDURE — 99232 PR SUBSEQUENT HOSPITAL CARE,LEVL II: ICD-10-PCS | Mod: ,,, | Performed by: NURSE PRACTITIONER

## 2019-05-20 PROCEDURE — 86920 COMPATIBILITY TEST SPIN: CPT

## 2019-05-20 PROCEDURE — 86901 BLOOD TYPING SEROLOGIC RH(D): CPT

## 2019-05-20 PROCEDURE — 82803 BLOOD GASES ANY COMBINATION: CPT

## 2019-05-20 PROCEDURE — 80053 COMPREHEN METABOLIC PANEL: CPT

## 2019-05-20 PROCEDURE — 83735 ASSAY OF MAGNESIUM: CPT

## 2019-05-20 PROCEDURE — 20000000 HC ICU ROOM

## 2019-05-20 PROCEDURE — 99233 SBSQ HOSP IP/OBS HIGH 50: CPT | Mod: ,,, | Performed by: INTERNAL MEDICINE

## 2019-05-20 PROCEDURE — 85025 COMPLETE CBC W/AUTO DIFF WBC: CPT

## 2019-05-20 PROCEDURE — 99900035 HC TECH TIME PER 15 MIN (STAT)

## 2019-05-20 PROCEDURE — 94761 N-INVAS EAR/PLS OXIMETRY MLT: CPT

## 2019-05-20 PROCEDURE — 27000221 HC OXYGEN, UP TO 24 HOURS

## 2019-05-20 PROCEDURE — 99233 PR SUBSEQUENT HOSPITAL CARE,LEVL III: ICD-10-PCS | Mod: ,,, | Performed by: INTERNAL MEDICINE

## 2019-05-20 PROCEDURE — 84100 ASSAY OF PHOSPHORUS: CPT

## 2019-05-20 PROCEDURE — 99900026 HC AIRWAY MAINTENANCE (STAT)

## 2019-05-20 PROCEDURE — 37799 UNLISTED PX VASCULAR SURGERY: CPT

## 2019-05-20 PROCEDURE — 97110 THERAPEUTIC EXERCISES: CPT

## 2019-05-20 PROCEDURE — 85610 PROTHROMBIN TIME: CPT

## 2019-05-20 RX ORDER — INSULIN ASPART 100 [IU]/ML
10 INJECTION, SOLUTION INTRAVENOUS; SUBCUTANEOUS
Status: DISCONTINUED | OUTPATIENT
Start: 2019-05-21 | End: 2019-05-21

## 2019-05-20 RX ORDER — INSULIN ASPART 100 [IU]/ML
1-10 INJECTION, SOLUTION INTRAVENOUS; SUBCUTANEOUS EVERY 6 HOURS PRN
Status: DISCONTINUED | OUTPATIENT
Start: 2019-05-20 | End: 2019-05-24

## 2019-05-20 RX ORDER — INSULIN ASPART 100 [IU]/ML
8 INJECTION, SOLUTION INTRAVENOUS; SUBCUTANEOUS
Status: DISCONTINUED | OUTPATIENT
Start: 2019-05-20 | End: 2019-05-20

## 2019-05-20 RX ORDER — INSULIN ASPART 100 [IU]/ML
8 INJECTION, SOLUTION INTRAVENOUS; SUBCUTANEOUS
Status: DISCONTINUED | OUTPATIENT
Start: 2019-05-21 | End: 2019-05-20

## 2019-05-20 RX ORDER — FUROSEMIDE 10 MG/ML
40 INJECTION INTRAMUSCULAR; INTRAVENOUS ONCE
Status: DISCONTINUED | OUTPATIENT
Start: 2019-05-20 | End: 2019-05-20

## 2019-05-20 RX ORDER — CARVEDILOL 6.25 MG/1
6.25 TABLET ORAL 2 TIMES DAILY
Status: DISCONTINUED | OUTPATIENT
Start: 2019-05-20 | End: 2019-05-20

## 2019-05-20 RX ORDER — SODIUM,POTASSIUM PHOSPHATES 280-250MG
2 POWDER IN PACKET (EA) ORAL ONCE
Status: COMPLETED | OUTPATIENT
Start: 2019-05-20 | End: 2019-05-20

## 2019-05-20 RX ORDER — FUROSEMIDE 10 MG/ML
20 INJECTION INTRAMUSCULAR; INTRAVENOUS ONCE
Status: COMPLETED | OUTPATIENT
Start: 2019-05-20 | End: 2019-05-20

## 2019-05-20 RX ORDER — INSULIN ASPART 100 [IU]/ML
10 INJECTION, SOLUTION INTRAVENOUS; SUBCUTANEOUS
Status: DISCONTINUED | OUTPATIENT
Start: 2019-05-20 | End: 2019-05-21

## 2019-05-20 RX ORDER — POTASSIUM CHLORIDE 20 MEQ/15ML
40 SOLUTION ORAL ONCE
Status: COMPLETED | OUTPATIENT
Start: 2019-05-20 | End: 2019-05-20

## 2019-05-20 RX ORDER — HYDROCODONE BITARTRATE AND ACETAMINOPHEN 500; 5 MG/1; MG/1
TABLET ORAL
Status: DISCONTINUED | OUTPATIENT
Start: 2019-05-20 | End: 2019-05-21

## 2019-05-20 RX ADMIN — VANCOMYCIN HYDROCHLORIDE 1500 MG: 100 INJECTION, POWDER, LYOPHILIZED, FOR SOLUTION INTRAVENOUS at 09:05

## 2019-05-20 RX ADMIN — RIFAMPIN 300 MG: 600 INJECTION, POWDER, LYOPHILIZED, FOR SOLUTION INTRAVENOUS at 04:05

## 2019-05-20 RX ADMIN — CEFEPIME 2 G: 2 INJECTION, POWDER, FOR SOLUTION INTRAVENOUS at 04:05

## 2019-05-20 RX ADMIN — INSULIN ASPART 1 UNITS: 100 INJECTION, SOLUTION INTRAVENOUS; SUBCUTANEOUS at 04:05

## 2019-05-20 RX ADMIN — INSULIN ASPART 6 UNITS: 100 INJECTION, SOLUTION INTRAVENOUS; SUBCUTANEOUS at 12:05

## 2019-05-20 RX ADMIN — HYDROMORPHONE HYDROCHLORIDE 1 MG: 1 INJECTION, SOLUTION INTRAMUSCULAR; INTRAVENOUS; SUBCUTANEOUS at 08:05

## 2019-05-20 RX ADMIN — CEFEPIME 2 G: 2 INJECTION, POWDER, FOR SOLUTION INTRAVENOUS at 11:05

## 2019-05-20 RX ADMIN — PANTOPRAZOLE SODIUM 40 MG: 40 GRANULE, DELAYED RELEASE ORAL at 08:05

## 2019-05-20 RX ADMIN — LABETALOL HCL IV SOLN PREFILLED SYRINGE 20 MG/4ML (5 MG/ML) 10 MG: 20/4 SOLUTION PREFILLED SYRINGE at 03:05

## 2019-05-20 RX ADMIN — CEFEPIME 2 G: 2 INJECTION, POWDER, FOR SOLUTION INTRAVENOUS at 07:05

## 2019-05-20 RX ADMIN — CHLORHEXIDINE GLUCONATE 0.12% ORAL RINSE 15 ML: 1.2 LIQUID ORAL at 09:05

## 2019-05-20 RX ADMIN — INSULIN ASPART 3 UNITS: 100 INJECTION, SOLUTION INTRAVENOUS; SUBCUTANEOUS at 11:05

## 2019-05-20 RX ADMIN — FLUCONAZOLE 200 MG: 40 POWDER, FOR SUSPENSION ORAL at 08:05

## 2019-05-20 RX ADMIN — POTASSIUM CHLORIDE 40 MEQ: 20 SOLUTION ORAL at 08:05

## 2019-05-20 RX ADMIN — INSULIN ASPART 8 UNITS: 100 INJECTION, SOLUTION INTRAVENOUS; SUBCUTANEOUS at 11:05

## 2019-05-20 RX ADMIN — MICONAZOLE NITRATE: 20 OINTMENT TOPICAL at 09:05

## 2019-05-20 RX ADMIN — HYDROMORPHONE HYDROCHLORIDE 1 MG: 1 INJECTION, SOLUTION INTRAMUSCULAR; INTRAVENOUS; SUBCUTANEOUS at 06:05

## 2019-05-20 RX ADMIN — POTASSIUM & SODIUM PHOSPHATES POWDER PACK 280-160-250 MG 2 PACKET: 280-160-250 PACK at 10:05

## 2019-05-20 RX ADMIN — SILODOSIN 4 MG: 4 CAPSULE ORAL at 08:05

## 2019-05-20 RX ADMIN — CHLORHEXIDINE GLUCONATE 0.12% ORAL RINSE 15 ML: 1.2 LIQUID ORAL at 08:05

## 2019-05-20 RX ADMIN — INSULIN ASPART 3 UNITS: 100 INJECTION, SOLUTION INTRAVENOUS; SUBCUTANEOUS at 08:05

## 2019-05-20 RX ADMIN — Medication 1 MG: at 06:05

## 2019-05-20 RX ADMIN — HYDRALAZINE HYDROCHLORIDE 10 MG: 20 INJECTION INTRAMUSCULAR; INTRAVENOUS at 01:05

## 2019-05-20 RX ADMIN — MICONAZOLE NITRATE: 20 OINTMENT TOPICAL at 08:05

## 2019-05-20 RX ADMIN — Medication 2 MG: at 10:05

## 2019-05-20 RX ADMIN — MAGNESIUM SULFATE IN WATER 4 G: 40 INJECTION, SOLUTION INTRAVENOUS at 08:05

## 2019-05-20 RX ADMIN — Medication 2 MG: at 09:05

## 2019-05-20 RX ADMIN — INSULIN ASPART 6 UNITS: 100 INJECTION, SOLUTION INTRAVENOUS; SUBCUTANEOUS at 08:05

## 2019-05-20 RX ADMIN — INSULIN ASPART 6 UNITS: 100 INJECTION, SOLUTION INTRAVENOUS; SUBCUTANEOUS at 04:05

## 2019-05-20 RX ADMIN — FUROSEMIDE 20 MG: 10 INJECTION, SOLUTION INTRAMUSCULAR; INTRAVENOUS at 08:05

## 2019-05-20 RX ADMIN — RIFAMPIN 300 MG: 600 INJECTION, POWDER, LYOPHILIZED, FOR SOLUTION INTRAVENOUS at 03:05

## 2019-05-20 NOTE — PROGRESS NOTES
[facility]  Urology  Progress Note    Patient Name: Mariann Huff  MRN: [id number]  Admission Date: 4/20/2019  Hospital Length of Stay: 30 days  Code Status: Full Code   Attending Provider: Robin Boyd MD   Primary Care Physician: Jasbir Haney MD    Subjective:     HPI:  Mariann Huff is a 59 y.o. female with history of HTN, type 2 diabetes mellitus, CAD, NSTEMI, and back pain who presents to [facility] with altered mental status and sepsis. She underwent L5-S1 OLIF with NSGY on 4/12 and had intraoperative left ureteral injury with ureteroureteral anastomosis and ureteral stent placement. She did well initially and had correa and MADHURI drain removed on 4/16. She began having chills and altered mental status 2 days ago and this has progressively worsened. No complaints of pain.     She is altered and HPI is limited. In the ED she is febrile to 103 and tachycardic and pressures are low normal. WBC is 5, creatinine is 3.6 baseline 1.0, lactate is 4.6. Cath UA concerning for infection, 3+ LE, >100 WBCs, and many bacteria on microscopy.    CT and MRI abdomen both show air with minimal fluid near the surgical site with left ureter coursing through. There is air throughout the proximal collecting system which is decompressed with JJ ureteral stent in good position.    Taken for ex lap, ligation of left ureter and left neph tube placement on 4/21/19.    Interval History: Yesterday, patient became anxious and showed desaturations. She was bag ventilated and vent settings were increased. CXR showed diffuse edema, and patient was given IV Lasix. Urine output excellent. Cr stable at 1.0. H&H stable. WBC increased 18 -> 24.    Review of Systems  Objective:     Temp:  [98.8 °F (37.1 °C)-102.8 °F (39.3 °C)] 98.8 °F (37.1 °C)  Pulse:  [] 92  Resp:  [14-38] 25  SpO2:  [89 %-100 %] 100 %  BP: ()/() 144/63  Arterial Line BP: (102-183)/(40-78) 143/54     Body mass index is 27.88 kg/m².      Bladder Scan  Volume (mL): 27 mL (05/10/19 0846)  Post Void Cath Residual Output (mL): 27 mL (05/10/19 0846)    Drains     Drain                 Nephrostomy 04/21/19 0629 Left 8 Fr. 28 days         Gastrostomy/Enterostomy 05/02/19 1134 Percutaneous endoscopic gastrostomy (PEG) LUQ decompression;feeding 17 days         Urethral Catheter 05/16/19 1040 16 Fr. 3 days                Physical Exam   Constitutional: She appears well-developed. No distress.   HENT:   Head: Normocephalic and atraumatic.   Neck: No JVD present.   Cardiovascular: Normal rate and regular rhythm.    Pulmonary/Chest: Effort normal. No respiratory distress.   On vent   Abdominal: Soft. She exhibits no distension. There is no rebound and no guarding.   Incision c/d/i   G-tube   Genitourinary:   Genitourinary Comments: Fontenot in place draining clear yellow urine  Left neph tube with clear yellow urine   Neurological: She is alert.   Skin: Skin is warm and dry. She is not diaphoretic. No pallor.         Significant Labs:    BMP:  Recent Labs   Lab 05/19/19  0336 05/19/19  2030 05/20/19  0358    143 140   K 4.1 4.0 3.7   * 113* 111*   CO2 23 22* 20*   BUN 41* 43* 43*   CREATININE 1.2 1.1 1.0   CALCIUM 8.8 8.6* 8.5*       CBC:   Recent Labs   Lab 05/19/19  0336  05/19/19  1843 05/20/19  0358 05/20/19  0546   WBC 18.52*  --   --  24.57* 23.19*   HGB 6.5*  --  7.9* 7.7* 7.5*   HCT 20.7*   < > 23.3* 23.3* 23.0*     --   --  214 212    < > = values in this interval not displayed.     Significant Imaging:  All pertinent imaging results/findings from the past 24 hours have been reviewed.  CXR - diffuse pulmonary edema              Assessment/Plan:     * Ureteral transection of left native kidney  Mariann Huff is a 59 y.o. female s/p left ureteral injury on 4/12, presented with fever, AMS, and intraabdominal abscess, taken for washout and ligation of left ureter with neph tube placement on 4/21, Trach/PEG 5/2.  - on vent with increased PEEP and FiO2;  wean per SICU  - Tube feeds per SICU  - Vanc, cefepime, rifampin per ID recs   - 5/13 UCx growing Candida albicans; started course of Diflucan   - ID recommends reimaging patient should she continue to show signs of infection  - Maintain neph tube and Fontenot  - Urine output adequate, Cr stable at 1.0  - diuresis/fluids per SICU  - bloody BMs - H&H stable    Dispo: continue ICU care; plan for transfer to LTAC once stable.    Sepsis  As above        VTE Risk Mitigation (From admission, onward)        Ordered     IP VTE LOW RISK PATIENT  Once      05/08/19 1315     Place sequential compression device  Until discontinued      04/20/19 3899          Mook Ferguson MD  Urology  [facility]

## 2019-05-20 NOTE — PROGRESS NOTES
[facility]  Colorectal Surgery  Progress Note    Patient Name: Mariann Huff  MRN: [id number]  Admission Date: 4/20/2019  Hospital Length of Stay: 30 days  Attending Physician: Robin Boyd MD    Subjective:     Interval History: started having BRBPR this am, HDS, H/H stable after 2 uPRBCs yesterday    Post-Op Info:  Procedure(s) (LRB):  SIGMOIDOSCOPY, FLEXIBLE (N/A)   7 Days Post-Op      Medications:  Continuous Infusions:   dexmedetomidine (PRECEDEX) infusion Stopped (05/20/19 0651)     Scheduled Meds:   carvedilol  6.25 mg Per G Tube BID    ceFEPime (MAXIPIME) IVPB  2 g Intravenous Q8H    chlorhexidine  15 mL Mouth/Throat BID    fluconazole 40 mg/ml  200 mg Per G Tube Daily    insulin aspart U-100  6 Units Subcutaneous Q24H    insulin aspart U-100  6 Units Subcutaneous Q24H    insulin aspart U-100  6 Units Subcutaneous Q24H    insulin aspart U-100  6 Units Subcutaneous Q24H    insulin aspart U-100  6 Units Subcutaneous Q24H    insulin aspart U-100  6 Units Subcutaneous Q24H    loperamide  2 mg Per G Tube QID    miconazole nitrate 2%   Topical (Top) BID    pantoprazole  40 mg Per G Tube Daily    potassium, sodium phosphates  2 packet Per G Tube Once    rifAMpin (RIFADIN) IVPB  300 mg Intravenous Q12H    silodosin  4 mg Per G Tube Daily    vancomycin (VANCOCIN) IVPB  1,500 mg Intravenous Q24H     PRN Meds:   sodium chloride    acetaminophen    albuterol-ipratropium    dextrose 50%    glucagon (human recombinant)    hydrALAZINE    HYDROmorphone    hydrOXYzine    insulin aspart U-100    labetalol    magnesium sulfate IVPB    magnesium sulfate IVPB    ondansetron    promethazine (PHENERGAN) IVPB    sodium chloride 0.9%        Objective:     Vital Signs (Most Recent):  Temp: (!) 100.4 °F (38 °C) (05/20/19 0700)  Pulse: 90 (05/20/19 0915)  Resp: (!) 25 (05/20/19 0915)  BP: (!) 108/56 (05/20/19 0854)  SpO2: 100 % (05/20/19 0915) Vital Signs (24h Range):  Temp:  [98.8  °F (37.1 °C)-102.8 °F (39.3 °C)] 100.4 °F (38 °C)  Pulse:  [] 90  Resp:  [14-38] 25  SpO2:  [89 %-100 %] 100 %  BP: ()/() 108/56  Arterial Line BP: ()/(36-78) 94/49     Intake/Output - Last 3 Shifts       05/18 0700 - 05/19 0659 05/19 0700 - 05/20 0659 05/20 0700 - 05/21 0659    I.V. (mL/kg) 211 (2.8) 281 (3.7)     Blood  572     NG/GT 1460 1340 250    Total Intake(mL/kg) 1671 (22) 2193 (28.9) 250 (3.3)    Urine (mL/kg/hr) 1300 (0.7) 2220 (1.2) 185 (1)    Stool       Total Output 1300 2220 185    Net +371 -27 +65                 Physical Exam   Constitutional: She is oriented to person, place, and time. No distress.   Eyes: EOM are normal.   Neck: Neck supple.   Cardiovascular: Normal rate and regular rhythm.   Pulmonary/Chest: Effort normal. No respiratory distress.   Abdominal: Soft. She exhibits no distension. There is no tenderness.   Musculoskeletal: Normal range of motion.   Neurological: She is alert and oriented to person, place, and time.   Skin: Skin is warm and dry.       Anorectal Exam:  External exam still with anterior ulcer  BRBPR on exam  Placed 2 packs of surgicel in anal canal/rectum    Significant Labs:  BMP (Last 3 Results):   Recent Labs   Lab 05/18/19  0256 05/19/19  0336 05/19/19 2030 05/20/19  0358   * 175* 232* 275*   * 144 143 140   K 4.3 4.1 4.0 3.7   * 113* 113* 111*   CO2 24 23 22* 20*   BUN 35* 41* 43* 43*   CREATININE 0.9 1.2 1.1 1.0   CALCIUM 8.7 8.8 8.6* 8.5*   MG 1.7 2.0  --  1.5*     CBC (Last 3 Results):   Recent Labs   Lab 05/19/19  0336  05/19/19  1843 05/20/19  0358 05/20/19  0546   WBC 18.52*  --   --  24.57* 23.19*   RBC 2.28*  --   --  2.65* 2.55*   HGB 6.5*  --  7.9* 7.7* 7.5*   HCT 20.7*   < > 23.3* 23.3* 23.0*     --   --  214 212   MCV 91  --   --  88 90   MCH 28.5  --   --  29.1 29.4   MCHC 31.4*  --   --  33.0 32.6    < > = values in this interval not displayed.       Significant Diagnostics:  I have reviewed all  pertinent imaging results/findings within the past 24 hours.    Assessment/Plan:     BRBPR (bright red blood per rectum)  Ms. Huff is a 58 yo F with PMH of HTN, DM II, CAD, NSTEMI, s/p L5-S1 OLIF with NSGY 4/12 c/b intraoperative left ureteral injury and underwent ureteroureteral anastomoses and ureteral stent placement complicated by sepsis due to E.coli septicemia now s/p ex lap on 4/21 and PEG/Trach of the vent now having BRBPR.     S/p flex sig on 5/13  Placed hemablast in rectum on 5/15  Rectum packed with surgicel this am, will f/u bleeding, may need to repeat flex sig if continues to bleed  Transfuse as needed, cont to trend H/H  cont to hold hep gtt  Please call with any questions or concerns               John Barker MD  Colorectal Surgery  [facility]

## 2019-05-20 NOTE — PLAN OF CARE
Problem: Adult Inpatient Plan of Care  Goal: Plan of Care Review  Outcome: Ongoing (interventions implemented as appropriate)  Dx: Ureteral transection of left native kidney     Shift Events: 2 units of PRBCs given for low H&H. Most recent H&H post administration 7.9/23.3. RN dilaudid given to maintain adequate pain control. 2 BMs during shift. Blood tinged BMs. Patient febrile during shift. MD made aware. Infectious disease consulted. Blood cultures, sputum cultures, urinalysis, and wound culture ordered and sent. Following increased anxiety episode, patient placed back on ventilator with a rate. Precedex started to control agitation. IV lasix given to pull off fluid. Chest xray performed. See note. Blood sugars checked q 4 hours. PRN and scheduled insulin given to for blood sugars within range. Radial arterial line placed at the bedside by Dr. Srinivas MD. Patient tolerated well. Instructed to keep MAPs >60. Plan of care reviewed with patient and spouse. Questions and concerns addressed. Will continue to monitor.      Neuro: Arouses to Voice and Follows Commands Moves all extremities      Diet: NPO and Tube Feeds     Gtts: None     Urine Output: Urinary Catheter 585 cc/shift   Nephrostomy Tube 1 L/shift      Labs/Accuchecks: Accuchecks q 4 hours. Daily labs.     Skin: Bilateral lower back wounds cleansed with normal saline, medi-honey applied, and foam dressings changed.  Miconazole ointment and triad cream applied to buttocks, perirectal region, and groin to prevent further breakdown. Patient turned q 2 to prevent further skin breakdown

## 2019-05-20 NOTE — PT/OT/SLP PROGRESS
Physical Therapy Treatment    Patient Name:  Mariann Huff   MRN:  [id number]    Recommendations:     Discharge Recommendations:  rehabilitation facility   Discharge Equipment Recommendations: (will determine DME needs closer to discharge)   Barriers to discharge: Decreased caregiver support family will not be able to assist pt at current functional level.     Assessment:     Mariann Huff is a 59 y.o. female admitted with a medical diagnosis of Ureteral transection of left native kidney.  She presents with the following impairments/functional limitations:  weakness, gait instability, impaired balance, impaired endurance, impaired functional mobilty, decreased lower extremity function pt kenyetta treatment well but cont to bleed from rectum which limits functional mobility. Pt will benefit from cont skilled PT 4x/wk and will need inpt rehab when medically stable. Pt is s/p exp lap, ligation L ureter 4/21, trach/PEG 5/2. Pt had back surgery 4/12/19 previous admit.     Rehab Prognosis: Good; patient would benefit from acute skilled PT services to address these deficits and reach maximum level of function.    Recent Surgery: Procedure(s) (LRB):  SIGMOIDOSCOPY, FLEXIBLE (N/A) 7 Days Post-Op    Plan:     During this hospitalization, patient to be seen 4 x/week to address the identified rehab impairments via gait training, therapeutic activities, therapeutic exercises, neuromuscular re-education and progress toward the following goals:    · Plan of Care Expires:  06/03/19    Subjective     Chief Complaint: pt had no complaints during treatment.   Patient/Family Comments/goals:  To get better and go home.   Pain/Comfort:  · Pain Rating 1: 0/10  · Pain Rating Post-Intervention 1: 0/10      Objective:     Communicated with nurse prior to session.  Patient found supine with telemetry, arterial line, pulse ox (continuous), tracheostomy, ventilator, PEG Tube, correa catheter, PICC line(t-tube, ostomy bag on rectum) upon PT entry  to room.     General Precautions: Standard, fall   Orthopedic Precautions:    Braces: LSO     Functional Mobility:  Not tested. Pt cont to have significant bleeding  from rectum.    AM-PAC 6 CLICK MOBILITY  Turning over in bed (including adjusting bedclothes, sheets and blankets)?: 2  Sitting down on and standing up from a chair with arms (e.g., wheelchair, bedside commode, etc.): 2  Moving from lying on back to sitting on the side of the bed?: 2  Moving to and from a bed to a chair (including a wheelchair)?: 2  Need to walk in hospital room?: 1  Climbing 3-5 steps with a railing?: 1  Basic Mobility Total Score: 10       Therapeutic Activities and Exercises:   pt performed AAROM there exer x 4 extremities x 15 reps in supine.     Patient left supine with all lines intact and call button in reach..    GOALS:   Multidisciplinary Problems     Physical Therapy Goals        Problem: Physical Therapy Goal    Goal Priority Disciplines Outcome Goal Variances Interventions   Physical Therapy Goal     PT, PT/OT      Description:  Goals to be met by: 19    Patient will increase functional independence with mobility by performin. Supine to sit with Moderate Assistance -not met  2. Sit to stand transfer with Minimal Assistance -not met  3. Gait  x 30 feet with Contact Guard Assistance using Rolling Walker. -not met  4. Lower extremity exercise program x15 reps , with assistance as needed- met                        Time Tracking:     PT Received On: 19  PT Start Time: 829     PT Stop Time: 852  PT Total Time (min): 23 min     Billable Minutes: Therapeutic Exercise 23 min    Treatment Type: Treatment  PT/PTA: PT     PTA Visit Number: 0     Cathy Taylor, PT  2019

## 2019-05-20 NOTE — PROGRESS NOTES
Pt placed on cooling blanket due to elevated temp. MD notified. No new orders at this time. Will continue to monitor closely.

## 2019-05-20 NOTE — ASSESSMENT & PLAN NOTE
ASSESSMENT/PLAN:   Mariann Huff is a 59 y.o. female s/p left ureteral injury on 4/12, who presented with fever, AMS, and intraabdominal abscess, taken for washout and ligation of left ureter with nephrostomy tube placement on 4/21. S/p Trach/PEG on 5/2. Episode of negative pressure pulmonary edema on 5/8 requiring mechanical ventilation, diuresis and low dose pressor. Has been plagued by intermittent BRBPR.     Neuro:   - off sedation  - responds to questions appropriately by nodding  - no focal deficit     Pulmonary:   Moved from PAV+ to trach collar on 5/17; back to PAV+ 5/19; then again back on rate 5/19  - CXR bilateral haziness  - ABGs prn  - volume overloaded  - diuresis as Cr tolerates     Cardiac:   - HDS at this time  - alternating HTN and hypotension  - TTE 5/8 with no evidence of right heart strain or significant valvular pathology, normal LV systolic function, EF 70%     Renal:    - S/p transection of left ureter 4/12 and subsequent ligation and PCT nephrostomy tube placement with IR 4/21  - Fontenot removed 5/15, but then replaced for retention   - fluconazole for candiduria  - lasix 20 this AM; following up with RFP at noon  - Monitor I/Os       Intake/Output Summary (Last 24 hours) at 5/20/2019 1016  Last data filed at 5/20/2019 0900  Gross per 24 hour   Intake 2033 ml   Output 1895 ml   Net 138 ml        ID:   - febrile > 102  - increase in WBC again  - Blood 5/10 NGTD  - Ucx 5/13 candiduria  - BAL 5/18 GNR  - C diff 5/5 negative  - vanc, cefepime (empiric) and rifampin (spine hardware)     Hem/Onc:   - H/H fell   - getting 2U PRBC yesterday with incomplete response  - check Hgb prn for bleeding     Endocrine:  - DM Type 2  - TF @ goal  - LDSSI  - Endocrine following for assistance with blood glucose control.      Fluids/Electrolytes/Nutrition/GI:   - Free water flushes 300 cc q6h  - Replace electrolytes PRN     # rectal ulcers  - intermittent hematochezia not getting better  - anoscopy and  hemostatic agent applied by CRS 5/15  - possible scope this PM with CRS  - imodium QID     PPx:  - DVT: Lovenox, Hep gtt held for continued bloody BMs        DISPO:  - Continue SICU care  - Preparing for LTACH once bloody BMs are managed

## 2019-05-20 NOTE — ASSESSMENT & PLAN NOTE
BG goal 140 - 180    Increase Novolog 10 units q 4 hrs (persistent correction scale has been needed)  Hold if TFs are stopped.  Increase to Moderate Dose SQ Insulin Correction Scale PRN for BG excursions.   BG monitoring q 4 hrs.     ** Please notify Endocrine for any change and/or advance in diet/TF**  ** Please call Endocrine for any BG related issues **    Discharge Recommendations:     TBD.

## 2019-05-20 NOTE — PLAN OF CARE
Problem: Adult Inpatient Plan of Care  Goal: Patient-Specific Goal (Individualization)  Dx: Urosepsis  Hx: HTN, DM, CAD, NSTEMI s/p stent 2014, and back pain who [place] [facility] with altered mental status and sepsis.     4/12: L5-S1 OLIF with NSGY-intraoperative left ureteral injury with ureteroureteral anastomosis and ureteral stent placement. Did well and was discharged home  4/20: ED for AMS, temp 103, tachy  4/21: Admitted to SICU after emergent ex-lap, IR for L nephrostomy placement, levo, vaso, insulin, propofol, blood cultures, flotrac, 3L LR, 800 isolyte, 2 units PRBC, 5L bolus  4/22: 500 albumin, vaso off  4/23: levo off  4/24: switched to precedex, blood culture  4/25:-4/29: SBT trials failed   4/30:  Blood cx.'s x2, VAP (BAL) cx  5/1: T-max: 101.7; CT Chest/ABD/pelvis w/contrast  5/2: Trach and PEG placed today, Accuchecks changed to Q2H.  5/3: US for right arm due to swelling. Midline placed, Trach collar trial successful, Trickle PEG tube feeds started. Heparing gtt started.   5/5: Bloody BMs x 4, H/H dropping  5/6: 1 unit PRBC's  5/7: Heparin gtt started.   5/8:  Pulmonary edema, R. Fem. TLC & R. Fem. A-line, CT-chest to r/o PE (neg.), Echo, Bronch., US BLE, CXR, 12-lead EKG.  Epi. gtts started.  5/9: VSS, PAV trial today--tolerating well, Heparin gtts infusing  5/11: 2u RBC  5/13; Sigmoidoscopy @ bedside, large bloody BM @ 2200; repacked with surgicel and colorectal team notified  5/14: 2 unit PRBC's    5/16- PRBCs x2  5/18: Panic attack, back on vent SPONT   5/19: tachycardic with low sats, back on rate, blood cultures, UA, and resp culture    Nursing:  - MAP >60, SBP <180   - Accuchecks Q4H                                      Outcome: Ongoing (interventions implemented as appropriate)  Plan of care reviewed with patient and her .  Questions and concerns addressed.  Patient continues to have bloody bowel movements.  CRS to perform scope tomorrow.  Refer to flow sheet for vital signs and  assessments.  Will continue to monitor.

## 2019-05-20 NOTE — ASSESSMENT & PLAN NOTE
60 y/o female with HTN, DMII, CAD, NSTEMI, s/p L5-S1 OLIF with NSGY 4/12 c/b intraoperative left ureteral injury and underwent ureteroureteral anastomoses and ureteral stent placement. She was discharged with a correa and MADHURI drain that was removed on 4/16. She was seen by urology and anticipated stent removal in 2-3 months (6/2019).  She presents to ED with AMS and E.coli septicemia found to have air and fluid collection of left anterior prevertebral soft tissue with left ureter involvement. She underwent ex-lap and washout on 4/21. We are consulted for abx recommendations.      Per op note,  There were pocket of purulence noted and evacuated, sent for culture of left retroperitoneum to pole of left kidney. The stent was visible. Posterior to the stent there was a pocket of purulent fluid and exposed hardware along the spinal vertebrae. The tissue along the distal and proximal end of ureter were friable and unhealthy. She underwent left nephrostomy tube placement. The stent was removed and several metal clips were placed on proximal end of the ureteral.      OR cultures with Staph Lugdenensis - not currently covered.  Patient does appear to be having diarrhea - C diff negative but suspect may need restesting if WBC increases and no other source found.  QTc long at 480      Repeat BCXs sent and NGTD.  BAL done but no WBC no organisms seen - NGTD.  Source of fever and leukocytosis unclear but ABD suspected though could be non infectious - drug reaction vs PE/DVT.  CT abdomen with superficial fluid collection - possible seroma and inflammatory change around ureter.  C diff repeated and negative.  Line changes jonathan cvc - removed.  RUE US shows partially occlusive thrombus in RIJ and proximal subclvian vein - suspected cause of fevers.  CXR clear last time seen by ID.    Again - She has developed fever and leukocytosis but appears not septic.  The patient denies any recent fever, chills, sweats, diarrhea, abdominal pain,  pain, or dysuria.  She is still bleeding from rectum per nurse. CXR with new L basilar consolidation w cf HCAP.  ABD drain site with pus - cultured - ? Superficial..      There could be multiple causes to fever and leukocytosis in this patient.  Stable non septic    Plan  -Recommend discontinuing ancef and start vanc/cefepime cf HCAP  -Continue Rifampin.  - drain site pus sent for culture - ? superifcial  -Recommend working up other causes of white count as outline above.  If no improvement, would recommend CT abdomen/pelvis.  - if clinically deteriorates would do Vanc and Meropenem given long hospital exposure and cf MDROs  - made primary team aware  -ID Will follow

## 2019-05-20 NOTE — ASSESSMENT & PLAN NOTE
Infection may elevate BG readings  Most likely cause for recent BG excursions.   Managed per primary team  Avoid hypoglycemia    Lab Results   Component Value Date    WBC 21.75 (H) 05/20/2019    HGB 6.7 (L) 05/20/2019    HCT 20.7 (L) 05/20/2019    MCV 89 05/20/2019     05/20/2019

## 2019-05-20 NOTE — SUBJECTIVE & OBJECTIVE
Subjective:     Interval History: started having BRBPR this am, HDS, H/H stable after 2 uPRBCs yesterday    Post-Op Info:  Procedure(s) (LRB):  SIGMOIDOSCOPY, FLEXIBLE (N/A)   7 Days Post-Op      Medications:  Continuous Infusions:   dexmedetomidine (PRECEDEX) infusion Stopped (05/20/19 0651)     Scheduled Meds:   carvedilol  6.25 mg Per G Tube BID    ceFEPime (MAXIPIME) IVPB  2 g Intravenous Q8H    chlorhexidine  15 mL Mouth/Throat BID    fluconazole 40 mg/ml  200 mg Per G Tube Daily    insulin aspart U-100  6 Units Subcutaneous Q24H    insulin aspart U-100  6 Units Subcutaneous Q24H    insulin aspart U-100  6 Units Subcutaneous Q24H    insulin aspart U-100  6 Units Subcutaneous Q24H    insulin aspart U-100  6 Units Subcutaneous Q24H    insulin aspart U-100  6 Units Subcutaneous Q24H    loperamide  2 mg Per G Tube QID    miconazole nitrate 2%   Topical (Top) BID    pantoprazole  40 mg Per G Tube Daily    potassium, sodium phosphates  2 packet Per G Tube Once    rifAMpin (RIFADIN) IVPB  300 mg Intravenous Q12H    silodosin  4 mg Per G Tube Daily    vancomycin (VANCOCIN) IVPB  1,500 mg Intravenous Q24H     PRN Meds:   sodium chloride    acetaminophen    albuterol-ipratropium    dextrose 50%    glucagon (human recombinant)    hydrALAZINE    HYDROmorphone    hydrOXYzine    insulin aspart U-100    labetalol    magnesium sulfate IVPB    magnesium sulfate IVPB    ondansetron    promethazine (PHENERGAN) IVPB    sodium chloride 0.9%        Objective:     Vital Signs (Most Recent):  Temp: (!) 100.4 °F (38 °C) (05/20/19 0700)  Pulse: 90 (05/20/19 0915)  Resp: (!) 25 (05/20/19 0915)  BP: (!) 108/56 (05/20/19 0854)  SpO2: 100 % (05/20/19 0915) Vital Signs (24h Range):  Temp:  [98.8 °F (37.1 °C)-102.8 °F (39.3 °C)] 100.4 °F (38 °C)  Pulse:  [] 90  Resp:  [14-38] 25  SpO2:  [89 %-100 %] 100 %  BP: ()/() 108/56  Arterial Line BP: ()/(36-78) 94/49     Intake/Output - Last 3  Shifts       05/18 0700 - 05/19 0659 05/19 0700 - 05/20 0659 05/20 0700 - 05/21 0659    I.V. (mL/kg) 211 (2.8) 281 (3.7)     Blood  572     NG/GT 1460 1340 250    Total Intake(mL/kg) 1671 (22) 2193 (28.9) 250 (3.3)    Urine (mL/kg/hr) 1300 (0.7) 2220 (1.2) 185 (1)    Stool       Total Output 1300 2220 185    Net +371 -27 +65                 Physical Exam   Constitutional: She is oriented to person, place, and time. No distress.   Eyes: EOM are normal.   Neck: Neck supple.   Cardiovascular: Normal rate and regular rhythm.   Pulmonary/Chest: Effort normal. No respiratory distress.   Abdominal: Soft. She exhibits no distension. There is no tenderness.   Musculoskeletal: Normal range of motion.   Neurological: She is alert and oriented to person, place, and time.   Skin: Skin is warm and dry.       Anorectal Exam:  External exam still with anterior ulcer  BRBPR on exam  Placed 2 packs of surgicel in anal canal/rectum    Significant Labs:  BMP (Last 3 Results):   Recent Labs   Lab 05/18/19  0256 05/19/19  0336 05/19/19 2030 05/20/19  0358   * 175* 232* 275*   * 144 143 140   K 4.3 4.1 4.0 3.7   * 113* 113* 111*   CO2 24 23 22* 20*   BUN 35* 41* 43* 43*   CREATININE 0.9 1.2 1.1 1.0   CALCIUM 8.7 8.8 8.6* 8.5*   MG 1.7 2.0  --  1.5*     CBC (Last 3 Results):   Recent Labs   Lab 05/19/19  0336  05/19/19  1843 05/20/19  0358 05/20/19  0546   WBC 18.52*  --   --  24.57* 23.19*   RBC 2.28*  --   --  2.65* 2.55*   HGB 6.5*  --  7.9* 7.7* 7.5*   HCT 20.7*   < > 23.3* 23.3* 23.0*     --   --  214 212   MCV 91  --   --  88 90   MCH 28.5  --   --  29.1 29.4   MCHC 31.4*  --   --  33.0 32.6    < > = values in this interval not displayed.       Significant Diagnostics:  I have reviewed all pertinent imaging results/findings within the past 24 hours.

## 2019-05-20 NOTE — PROGRESS NOTES
[facility]  Critical Care - Surgery  Progress Note    Patient Name: Mariann Huff  MRN: [id number]  Admission Date: 4/20/2019  Hospital Length of Stay: 30 days  Code Status: Full Code  Attending Provider: Robin Boyd MD  Primary Care Provider: Jasbir Haney MD   Principal Problem: Ureteral transection of left native kidney    Subjective:     Hospital/ICU Course:  The patient has had 2 episodes of bradycardia during the day.  Early in the morning, the patient had difficulty breathing and a mucus plug was found in the inner cannula of the tracheostomy.  She developed bradycardia but never arrested.  She also developed hypotension.  This was followed subsequently after she was bagged (Ambu) with tachypnea and hypertension and tachycardia.  Oxygen saturations were in the low 90s, and the arterial blood gas at that time revealed a significant alveolar arterial gradient with adequate ventilation.  Chest x-ray, which I personally reviewed, revealed what appeared to be a significant fluid overload.  We did perform an echocardiogram urgently, which I personally was present and reviewed the results.  There is no evidence of heart failure.  EKG was reported as normal and there is no evidence that the patient had an acute myocardial event.  The chest x-ray did suggest a significant amount of pulmonary edema. There was also a concern, because the the history of upper body deep venous thrombosis, that this could be a pulmonary embolism.  A CT scan of the chest , which I have personally reviewed show bilateral infiltrates compatible with pulmonary edema of non cardiogenic origin.  It was not possible to rule out a pulmonary embolism.  The patient is anticoagulated currently.  Ultrasound of the lower extremities was done and they do not show any deep venous thrombosis.    The patient did receive some fluids, because of her history of acute kidney injury, which has resolved and because she was receiving IV  contrast.  She has maintained a good urinary output during the day.  She has mostly being hemodynamically stable and her tachycardia resolved.  She remains sedated and on the ventilator.  Follow-up chest x-ray, which I personally reviewed, showed resolving pulmonary edema. Her oxygenation and oxygen saturations improved and we were able to wean the patient FiO2 down to 50%.    Of note, I did perform a bronchoscopy, which failed to reveal any amount of pus or mucus plugging.  There was also no evidence of aspiration.  BAL was performed and was sent.    A 2nd episode of bradycardia was observed in the afternoon.  This responded to the use of atropine and 0.1 mg of epinephrine.  She did have a tachycardic response with this which subsided rapidly.  She remained otherwise hemodynamically stable.  Arterial blood gases, reveal now a PO2 of 300 with adequate pCO2 and pH.  Mild hyperventilation.  She remains comfortable.  I have asked Cardiology to re-evaluate this patient.  For now she remains off pressors.    Neurologically the patient has been intact. She is awake alert and oriented with a nonfocal exam.    Her abdomen remains soft.  She has been NPO for now.  She has not had any further episodes of lower GI bleed.    The patient is having a good urinary output BUN and creatinine have been grossly within normal limits with a slight elevation of BUN.  Electrolytes are being followed and replaced as necessary.    Currently no evidence of infection in this patient.  Empirically I started antibiotics, but will stop them in the next 24-48 hours.    I have had the chance to talk to the patient's  at length and in detail.  I have explained our findings, I will keep him up-to-date as to any progress in understanding the etiology of these episodes and will wait continuing to talk to him as often as necessary.      Interval History/Significant Events:   2U PRBC yesterday with incomplete response. BRBPR again this morning.  CRS packed surgicel; possible scope this PM.    Desat yesterday and placed back on vent with rate.     Febrile again. Abx changed per ID to vanc/cefepime and continued on rifampin. BAL with few GNR.      Good UO via Fontenot and nephrostomy. Cr stable. Got lasix 40 yesterday. Another lasix 20 this morning.     Follow-up For: Procedure(s) (LRB):  SIGMOIDOSCOPY, FLEXIBLE (N/A)    Post-Operative Day: 7 Days Post-Op    Objective:     Vital Signs (Most Recent):  Temp: (!) 100.4 °F (38 °C) (05/20/19 0700)  Pulse: 88 (05/20/19 0930)  Resp: (!) 23 (05/20/19 0930)  BP: (!) 108/56 (05/20/19 0854)  SpO2: 100 % (05/20/19 0930) Vital Signs (24h Range):  Temp:  [98.8 °F (37.1 °C)-102.8 °F (39.3 °C)] 100.4 °F (38 °C)  Pulse:  [] 88  Resp:  [14-38] 23  SpO2:  [89 %-100 %] 100 %  BP: ()/() 108/56  Arterial Line BP: ()/(36-78) 92/50     Weight: 76 kg (167 lb 8.8 oz)  Body mass index is 27.88 kg/m².      Intake/Output Summary (Last 24 hours) at 5/20/2019 1006  Last data filed at 5/20/2019 0900  Gross per 24 hour   Intake 2033 ml   Output 1895 ml   Net 138 ml       Physical Exam   Constitutional: She appears well-developed and well-nourished.   HENT:   Head: Normocephalic and atraumatic.   Tracheostomy in place   Eyes: Right eye exhibits no discharge. Left eye exhibits no discharge.   Neck: Normal range of motion. Neck supple.   Cardiovascular: Normal rate and regular rhythm.   Pulmonary/Chest: Effort normal. No respiratory distress.   Abdominal: Soft.   Midline incision c/d/i.  PEG with no leak. Abdomen soft, non-distended.  Bowel sounds present   Genitourinary:   Genitourinary Comments: Nephrostomy tube in place with yellow output.  Fontenot in place   Musculoskeletal: Normal range of motion.   Neurological: She is alert.   Able to follow commands, denying pain.    Skin: Skin is warm and dry.   Psychiatric: She has a normal mood and affect.   Nursing note and vitals reviewed.      Vents:  Vent Mode: A/C (05/20/19  0915)  Ventilator Initiated: Yes (05/08/19 0630)  Set Rate: 20 bmp (05/20/19 0915)  Vt Set: 400 mL (05/20/19 0915)  Pressure Support: 10 cmH20 (05/19/19 1400)  PEEP/CPAP: 10 cmH20 (05/20/19 0915)  Oxygen Concentration (%): 61 (05/20/19 0930)  Peak Airway Pressure: 27 cmH2O (05/20/19 0915)  Plateau Pressure: 0 cmH20 (05/20/19 0915)  Total Ve: 7.26 mL (05/20/19 0915)  Negative Inspiratory Force (cm H2O): -18 (04/27/19 1306)  F/VT Ratio<105 (RSBI): (!) 76.22 (05/20/19 0915)    Lines/Drains/Airways     Drain                 Nephrostomy 04/21/19 0629 Left 8 Fr. 29 days         Gastrostomy/Enterostomy 05/02/19 1134 Percutaneous endoscopic gastrostomy (PEG) LUQ decompression;feeding 17 days         Urethral Catheter 05/16/19 1040 16 Fr. 3 days          Airway                 Surgical Airway 05/02/19 1107 Shiley Cuffed 17 days          Arterial Line                 Arterial Line 05/19/19 1830 Right Radial less than 1 day          Peripheral Intravenous Line                 Midline Catheter Insertion/Assessment  - Single Lumen 05/07/19 1632 Left basilic vein (medial side of arm) 18g x 8cm 12 days         Peripheral IV - Single Lumen 05/17/19 1630 20 G;1 3/4 in Right Upper Arm 2 days                Significant Labs:    CBC/Anemia Profile:  Recent Labs   Lab 05/19/19  0336  05/19/19  1843 05/20/19  0358 05/20/19  0546   WBC 18.52*  --   --  24.57* 23.19*   HGB 6.5*  --  7.9* 7.7* 7.5*   HCT 20.7*   < > 23.3* 23.3* 23.0*     --   --  214 212   MCV 91  --   --  88 90   RDW 15.0*  --   --  14.7* 15.0*    < > = values in this interval not displayed.        Chemistries:  Recent Labs   Lab 05/19/19  0336 05/19/19 2030 05/20/19  0358    143 140   K 4.1 4.0 3.7   * 113* 111*   CO2 23 22* 20*   BUN 41* 43* 43*   CREATININE 1.2 1.1 1.0   CALCIUM 8.8 8.6* 8.5*   ALBUMIN 1.7* 1.6* 1.6*   PROT 5.6* 5.7* 5.8*   BILITOT 0.5 0.9 0.8   ALKPHOS 102 114 122   ALT <5* <5* 6*   AST 18 25 24   MG 2.0  --  1.5*   PHOS 2.7  --   2.4*       All pertinent labs within the past 24 hours have been reviewed.    Significant Imaging:  I have reviewed all pertinent imaging results/findings within the past 24 hours.    Assessment/Plan:     * Ureteral transection of left native kidney  ASSESSMENT/PLAN:   Mariann Huff is a 59 y.o. female s/p left ureteral injury on 4/12, who presented with fever, AMS, and intraabdominal abscess, taken for washout and ligation of left ureter with nephrostomy tube placement on 4/21. S/p Trach/PEG on 5/2. Episode of negative pressure pulmonary edema on 5/8 requiring mechanical ventilation, diuresis and low dose pressor. Has been plagued by intermittent BRBPR.     Neuro:   - off sedation  - responds to questions appropriately by nodding  - no focal deficit     Pulmonary:   Moved from PAV+ to trach collar on 5/17; back to PAV+ 5/19; then again back on rate 5/19  - CXR bilateral haziness  - ABGs prn  - volume overloaded  - diuresis as Cr tolerates     Cardiac:   - HDS at this time  - alternating HTN and hypotension  - TTE 5/8 with no evidence of right heart strain or significant valvular pathology, normal LV systolic function, EF 70%     Renal:    - S/p transection of left ureter 4/12 and subsequent ligation and PCT nephrostomy tube placement with IR 4/21  - Fontenot removed 5/15, but then replaced for retention   - fluconazole for candiduria  - lasix 20 this AM; following up with RFP at noon  - Monitor I/Os       Intake/Output Summary (Last 24 hours) at 5/20/2019 1016  Last data filed at 5/20/2019 0900  Gross per 24 hour   Intake 2033 ml   Output 1895 ml   Net 138 ml        ID:   - febrile > 102  - increase in WBC again  - Blood 5/10 NGTD  - Ucx 5/13 candiduria  - BAL 5/18 GNR  - C diff 5/5 negative  - vanc, cefepime (empiric) and rifampin (spine hardware)     Hem/Onc:   - H/H fell   - getting 2U PRBC yesterday with incomplete response  - check Hgb prn for bleeding     Endocrine:  - DM Type 2  - TF @ goal  - LDSSI  -  Endocrine following for assistance with blood glucose control.      Fluids/Electrolytes/Nutrition/GI:   - Free water flushes 300 cc q6h  - Replace electrolytes PRN     # rectal ulcers  - intermittent hematochezia not getting better  - anoscopy and hemostatic agent applied by CRS 5/15  - possible scope this PM with CRS  - imodium QID     PPx:  - DVT: Lovenox, Hep gtt held for continued bloody BMs        DISPO:  - Continue SICU care  - Preparing for LTACH once bloody BMs are managed              Critical secondary to Patient has a condition that poses threat to life and bodily function: Severe Respiratory Distress     Critical care was time spent personally by me on the following activities: development of treatment plan with patient or surrogate and bedside caregivers, discussions with consultants, evaluation of patient's response to treatment, examination of patient, ordering and performing treatments and interventions, ordering and review of laboratory studies, ordering and review of radiographic studies, pulse oximetry, re-evaluation of patient's condition.  This critical care time did not overlap with that of any other provider or involve time for any procedures.     ISABELLE Carter MD  Critical Care - Surgery  [facility]

## 2019-05-20 NOTE — CONSULTS
NIAS consulted to place a 2nd Midline    Explained to nurse/team- patient has adequate access, current Midline working properly (infusing PRBC), NIAS does not place 2 Midlines due to increase risk of blood clot, and Midlines are not guaranteed for blood draws

## 2019-05-20 NOTE — PLAN OF CARE
Problem: Physical Therapy Goal  Goal: Physical Therapy Goal  Goals to be met by: 19    Patient will increase functional independence with mobility by performin. Supine to sit with Moderate Assistance -not met  2. Sit to stand transfer with Minimal Assistance -not met  3. Gait  x 30 feet with Contact Guard Assistance using Rolling Walker. -not met  4. Lower extremity exercise program x15 reps , with assistance as needed- met      Goals updated and are appropriate. Cathy Taylor, PT 2019

## 2019-05-20 NOTE — ASSESSMENT & PLAN NOTE
Elizaamnada Huff is a 59 y.o. female s/p left ureteral injury on 4/12, presented with fever, AMS, and intraabdominal abscess, taken for washout and ligation of left ureter with neph tube placement on 4/21, Trach/PEG 5/2.  - on vent with increased PEEP and FiO2; wean per SICU  - Tube feeds per SICU  - Vanc, cefepime, rifampin per ID recs   - 5/13 UCx growing Candida albicans; started course of Diflucan   - ID recommends reimaging patient should she continue to show signs of infection  - Maintain neph tube and Fontenot  - Urine output adequate, Cr stable at 1.0  - diuresis/fluids per SICU  - bloody BMs - H&H stable    Dispo: continue ICU care; plan for transfer to LTAC once stable.

## 2019-05-20 NOTE — PROGRESS NOTES
[facility]  Infectious Disease  Progress Note    Patient Name: Mariann Huff  MRN: [id number]  Admission Date: 4/20/2019  Length of Stay: 30 days  Attending Physician: Robin Boyd MD  Primary Care Provider: Jasbir Haney MD    Isolation Status: No active isolations  Assessment/Plan:      Sepsis  60 y/o female with HTN, DMII, CAD, NSTEMI, s/p L5-S1 OLIF with NSGY 4/12 c/b intraoperative left ureteral injury and underwent ureteroureteral anastomoses and ureteral stent placement. She was discharged with a correa and MADHURI drain that was removed on 4/16. She was seen by urology and anticipated stent removal in 2-3 months (6/2019).  She presents to ED with AMS and E.coli septicemia found to have air and fluid collection of left anterior prevertebral soft tissue with left ureter involvement. She underwent ex-lap and washout on 4/21. We are consulted for abx recommendations.      Per op note,  There were pocket of purulence noted and evacuated, sent for culture of left retroperitoneum to pole of left kidney. The stent was visible. Posterior to the stent there was a pocket of purulent fluid and exposed hardware along the spinal vertebrae. The tissue along the distal and proximal end of ureter were friable and unhealthy. She underwent left nephrostomy tube placement. The stent was removed and several metal clips were placed on proximal end of the ureteral.      OR cultures with Staph Lugdenensis - not currently covered.  Patient does appear to be having diarrhea - C diff negative but suspect may need restesting if WBC increases and no other source found.  QTc long at 480      Repeat BCXs sent and NGTD.  BAL done but no WBC no organisms seen - NGTD.  Source of fever and leukocytosis unclear but ABD suspected though could be non infectious - drug reaction vs PE/DVT.  CT abdomen with superficial fluid collection - possible seroma and inflammatory change around ureter.  C diff repeated and negative.  Line changes  jonathan cvc - removed.  RUE US shows partially occlusive thrombus in RIJ and proximal subclvian vein - suspected cause of fevers.  CXR clear last time seen by ID.    Again - She has developed fever and leukocytosis but appears not septic.  The patient denies any recent fever, chills, sweats, diarrhea, abdominal pain, pain, or dysuria.  She is still bleeding from rectum per nurse. CXR with new L basilar consolidation w cf HCAP.  ABD drain site with pus - cultured - ? Superficial..       Stable non septic    There could be multiple causes to fever and leukocytosis in this patient:  1. ABD - Prior CT 5/1  There are postsurgical and inflammatory changes in the ventral midline abdominal wall with a focal fluid collection in the midline measuring 2.2 x 4.7 x 6.6 cm (AP x TV x CC).  There is mild body wall edema  ? Resolved  2. GI bleeding  3. PNA - endotracheal aspirated with GNR   Blood cultures NGTD - resnt today    Plan  -Conttinue vanc/cefepime cf HCAP  -Continue Rifampin.  - drain site pus sent for culture - ? superificial - ngtd  - scope in AM with GI  - Recommend working up other causes of white count as outline above. - Recommend CT abdomen/pelvis as had SQ fluid on last CT  - if having diarrhea - send C Diff  - if clinically deteriorates would do Vanc and Meropenem given long hospital exposure and cf MDROs  - made primary team aware  -ID Will follow                     Anticipated Disposition: tbd    Thank you for your consult. I will follow-up with patient. Please contact us if you have any additional questions.    POLLY Jerome  Infectious Disease  [facility]    Subjective:     Principal Problem:Ureteral transection of left native kidney    HPI: 58 y/o female with HTN, DMII, CAD, NSTEMI, s/p L5-S1 OLIF with NSGY 4/12 sustained intraoperative laceration and underwent ureteroureteral anastomoses and ureteral stent placement. She was discharged with a correa and MADHURI drain that was removed on  4/16. She was seen by urology and anticipated stent removal in 2-3 months (6/2019).  She presents to ED with AMS and sepsis. She was febrile 103 in ED. WBC 5K on admit. Lactate 4.6. Cath UA with 3+leukocytes, >100 WBcs and many bacteria.     MRI: Postsurgical change of L5-S1 posterior spinal fusion and anterior interbody fusion with a 4.4 cm fluid fluid collection in the left anterior prevertebral soft tissues at the level of the L5-S1 disc space.  Findings may represent postoperative seroma or evolving hematoma however, urinoma not excluded.  No definite abscess although correlation is advised.    Irregular flow void involving the right common iliac vein, underlying thrombus not excluded.      CT: air collection within the anterior paraspinous soft tissues through which the left ureter courses. Given that the ureter courses through this, communication of the ureter with this collection is not excluded.  There is a ureteral stent within the left ureter.Punctate foci of air in L5-S1.  2. Air within the left renal collecting system, along the left ureter, and within the urinary bladder, correlation advised.    This was not amendable for drainage by IR so she was taken to OR for washout.     She underwent ex-lap of left ureter and left nephrostomy tube placement 4/21/19.   Per op note,  There were pocket of purulence noted and evacuated, sent for culture of left retroperitoneum to pole of left kidney. The stent was visible. Posterior to the stent there was a pocket of purulent fluid and exposed hardware along the spinal vertebrae. The tissue along the distal and proximal end of ureter were friable and unhealthy. She underwent left nephrostomy tube placement. The stent was removed and several metal clips were placed on proximal end of the ureteral. MADHURI drain was placed into left retroperitoneal.     Blood cultures were are positive for E coli..   Urine cultures +E.coli  OR cultures were positive for Staph Ludenensis  She  is was treated with zosyn. She is in the ICU, intubated, sedated    ID had seen the patient and there was concern for hardware involvement given proximity and the patient was placed on ceftriaxone and rifampin with plan for 6 weeks of abx.  ID was reconsulted due to fever and leukocytosis.  Patient denied any abdominal pain , fever, chills or sweats.  Repeat Bblood cultures and urine cultures are no growth.  Patient was broadened to Vanc, CTX and Rifampin, later vanc was stopped.  Patient improved and leukocytosis and fever resolved.  Patient had PEG and Tracheostomy.  ID signed off with plans to complete Ceftriaxone and rifampin for 6 weeks through 6/4.    Patient recently began again with progressive leukocytosis and fever so ID reconsulted.  Patient has been with low H/H and bleeding per rectum per nurse.  She has had 2 transfusions on 5/14,16 and 19. Patient denies any new complaints including fever, chills, sweats, N/V/D skin problems, rash, CP, pleuritic CP. CXR with new LLL consolidation and nurse reports increased coarse breath sounds and increased thick sputum.  Patient reports SOB.  Endotracheal aspirate shows GNRs  Blood cultures have been resent.  Of note patient had a subcutaneous fluid collection on last CT ABD.    No new subjective & objective note has been filed under this hospital service since the last note was generated.

## 2019-05-20 NOTE — SUBJECTIVE & OBJECTIVE
"Interval HPI:   Overnight events:  BG remains above goal on current SQ novolog insulin regimen. Patient experienced intense rectal bleeding, and mild hypoxia overnight as per chart review.     Eating:   NPO  Nausea: No  Hypoglycemia and intervention: No  Fever: No  TPN and/or TF: Yes (TF)  If yes, type of TF/TPN and rate: 40 ml/hr    BP (!) 108/56 (BP Location: Right arm, Patient Position: Lying)   Pulse 88   Temp (!) 100.4 °F (38 °C) (Oral)   Resp (!) 23   Ht 5' 5" (1.651 m)   Wt 76 kg (167 lb 8.8 oz)   SpO2 100%   Breastfeeding? No   BMI 27.88 kg/m²     Labs Reviewed and Include    Recent Labs   Lab 05/20/19  0358   *   CALCIUM 8.5*   ALBUMIN 1.6*   PROT 5.8*      K 3.7   CO2 20*   *   BUN 43*   CREATININE 1.0   ALKPHOS 122   ALT 6*   AST 24   BILITOT 0.8     Lab Results   Component Value Date    WBC 23.19 (H) 05/20/2019    HGB 7.5 (L) 05/20/2019    HCT 23.0 (L) 05/20/2019    MCV 90 05/20/2019     05/20/2019     No results for input(s): TSH, FREET4 in the last 168 hours.  Lab Results   Component Value Date    HGBA1C 10.8 (H) 02/19/2019       Nutritional status:   Body mass index is 27.88 kg/m².  Lab Results   Component Value Date    ALBUMIN 1.6 (L) 05/20/2019    ALBUMIN 1.6 (L) 05/19/2019    ALBUMIN 1.7 (L) 05/19/2019     No results found for: PREALBUMIN    Estimated Creatinine Clearance: 62.5 mL/min (based on SCr of 1 mg/dL).    Accu-Checks  Recent Labs     05/18/19  1944 05/19/19  0004 05/19/19  0359 05/19/19  0802 05/19/19  1219 05/19/19  1627 05/19/19  2008 05/20/19  0020 05/20/19  0406 05/20/19  0847   POCTGLUCOSE 294* 237* 190* 208* 210* 244* 227* 196* 279* 258*       Current Medications and/or Treatments Impacting Glycemic Control  Immunotherapy:    Immunosuppressants     None        Steroids:   Hormones (From admission, onward)    None        Pressors:    Autonomic Drugs (From admission, onward)    None        Hyperglycemia/Diabetes Medications:   Antihyperglycemics (From " admission, onward)    Start     Stop Route Frequency Ordered    05/20/19 1200  insulin aspart U-100 pen 6 Units      -- SubQ Every 24 hours (non-standard times) 05/19/19 1421    05/20/19 0800  insulin aspart U-100 pen 6 Units      -- SubQ Every 24 hours (non-standard times) 05/19/19 1421 05/20/19 0400  insulin aspart U-100 pen 6 Units      -- SubQ Every 24 hours (non-standard times) 05/19/19 1421 05/20/19 0000  insulin aspart U-100 pen 6 Units      -- SubQ Every 24 hours (non-standard times) 05/19/19 1421    05/19/19 2000  insulin aspart U-100 pen 6 Units      -- SubQ Every 24 hours (non-standard times) 05/19/19 1421    05/19/19 1600  insulin aspart U-100 pen 6 Units      -- SubQ Every 24 hours (non-standard times) 05/19/19 1421    05/10/19 1450  insulin aspart U-100 pen 0-5 Units      -- SubQ Every 4 hours PRN 05/10/19 1351

## 2019-05-20 NOTE — PROGRESS NOTES
Pt noted to be actively bleeding from her rectum. 2 blue pads saturated with BRBPR. MD notified and to come to the bedside. Stat cbc sent. Will continue to monitor closely.

## 2019-05-20 NOTE — CONSULTS
[facility]  Infectious Disease  Consult Note    Patient Name: Mariann Huff  MRN: [id number]  Admission Date: 4/20/2019  Hospital Length of Stay: 29 days  Attending Physician: Robin Boyd MD  Primary Care Provider: Jasbir Haney MD     Isolation Status: No active isolations    Patient information was obtained from patient and reords.      Consults  Assessment/Plan:     Sepsis  60 y/o female with HTN, DMII, CAD, NSTEMI, s/p L5-S1 OLIF with NSGY 4/12 c/b intraoperative left ureteral injury and underwent ureteroureteral anastomoses and ureteral stent placement. She was discharged with a correa and MADHURI drain that was removed on 4/16. She was seen by urology and anticipated stent removal in 2-3 months (6/2019).  She presents to ED with AMS and E.coli septicemia found to have air and fluid collection of left anterior prevertebral soft tissue with left ureter involvement. She underwent ex-lap and washout on 4/21. We are consulted for abx recommendations.      Per op note,  There were pocket of purulence noted and evacuated, sent for culture of left retroperitoneum to pole of left kidney. The stent was visible. Posterior to the stent there was a pocket of purulent fluid and exposed hardware along the spinal vertebrae. The tissue along the distal and proximal end of ureter were friable and unhealthy. She underwent left nephrostomy tube placement. The stent was removed and several metal clips were placed on proximal end of the ureteral.      OR cultures with Staph Lugdenensis - not currently covered.  Patient does appear to be having diarrhea - C diff negative but suspect may need restesting if WBC increases and no other source found.  QTc long at 480      Repeat BCXs sent and NGTD.  BAL done but no WBC no organisms seen - NGTD.  Source of fever and leukocytosis unclear but ABD suspected though could be non infectious - drug reaction vs PE/DVT.  CT abdomen with superficial fluid collection - possible seroma  and inflammatory change around ureter.  C diff repeated and negative.  Line changes jonathan cvc - removed.  RUE US shows partially occlusive thrombus in RIJ and proximal subclvian vein - suspected cause of fevers.  CXR clear last time seen by ID.    Again - She has developed fever and leukocytosis but appears not septic.  The patient denies any recent fever, chills, sweats, diarrhea, abdominal pain, pain, or dysuria.  She is still bleeding from rectum per nurse. CXR with new L basilar consolidation w cf HCAP.  ABD drain site with pus - cultured - ? Superficial..      There could be multiple causes to fever and leukocytosis in this patient.  Stable non septic    Plan  -Recommend discontinuing ancef and start vanc/cefepime cf HCAP  -Continue Rifampin.  - drain site pus sent for culture - ? superifcial  -Recommend working up other causes of white count as outline above.  If no improvement, would recommend CT abdomen/pelvis.  - if clinically deteriorates would do Vanc and Meropenem given long hospital exposure and cf MDROs  - made primary team aware  -ID Will follow                     Thank you for your consult. I will follow-up with patient. Please contact us if you have any additional questions.    POLLY Jerome  Infectious Disease  [facility]    Subjective:     Principal Problem: Ureteral transection of left native kidney    HPI: 58 y/o female with HTN, DMII, CAD, NSTEMI, s/p L5-S1 OLIF with NSGY 4/12 sustained intraoperative laceration and underwent ureteroureteral anastomoses and ureteral stent placement. She was discharged with a correa and MADHURI drain that was removed on 4/16. She was seen by urology and anticipated stent removal in 2-3 months (6/2019).  She presents to ED with AMS and sepsis. She was febrile 103 in ED. WBC 5K on admit. Lactate 4.6. Cath UA with 3+leukocytes, >100 WBcs and many bacteria.     MRI: Postsurgical change of L5-S1 posterior spinal fusion and anterior interbody  fusion with a 4.4 cm fluid fluid collection in the left anterior prevertebral soft tissues at the level of the L5-S1 disc space.  Findings may represent postoperative seroma or evolving hematoma however, urinoma not excluded.  No definite abscess although correlation is advised.    Irregular flow void involving the right common iliac vein, underlying thrombus not excluded.      CT: air collection within the anterior paraspinous soft tissues through which the left ureter courses. Given that the ureter courses through this, communication of the ureter with this collection is not excluded.  There is a ureteral stent within the left ureter.Punctate foci of air in L5-S1.  2. Air within the left renal collecting system, along the left ureter, and within the urinary bladder, correlation advised.    This was not amendable for drainage by IR so she was taken to OR for washout.     She underwent ex-lap of left ureter and left nephrostomy tube placement 4/21/19.   Per op note,  There were pocket of purulence noted and evacuated, sent for culture of left retroperitoneum to pole of left kidney. The stent was visible. Posterior to the stent there was a pocket of purulent fluid and exposed hardware along the spinal vertebrae. The tissue along the distal and proximal end of ureter were friable and unhealthy. She underwent left nephrostomy tube placement. The stent was removed and several metal clips were placed on proximal end of the ureteral. MADHURI drain was placed into left retroperitoneal.     Blood cultures were are positive for E coli..   Urine cultures +E.coli  OR cultures were positive for Staph Ludenensis  She is was treated with zosyn. She is in the ICU, intubated, sedated    ID had seen the patient and there was concern for hardware involvement given proximity and the patient was placed on ceftriaxone and rifampin with plan for 6 weeks of abx.  ID was reconsulted due to fever and leukocytosis.  Patient denied any abdominal  pain , fever, chills or sweats.  Repeat Bblood cultures and urine cultures are no growth.  Patient was broadened to Vanc, CTX and Rifampin, later vanc was stopped.  Patient improved and leukocytosis and fever resolved.  Patient had PEG and Tracheostomy.  ID signed off with plans to complete Ceftriaxone and rifampin for 6 weeks through 6/4.    Patient recently began again with progressive leukocytosis and fever so ID reconsulted.  Patient has been with low H/H and bleeding per rectum per nurse.  She has had 2 transfusions on 5/14,16 and 19. Patient denies any new complaints including fever, chills, sweats, N/V/D skin problems, rash, CP, pleuritic CP. CXR with new LLL consolidation and nurse reports increased coarse breath sounds and increased thick sputum.  Patient reports SOB.  Endotracheal aspirate shows GNRs  Blood cultures have been resent.  Of note patient had a subcutaneous fluid collection on last CT ABD.      Past Medical History:   Diagnosis Date    Anticoagulant long-term use     Asthma     Back pain     Bradycardia, unspecified 5/8/2019    The etiology of the bradycardic episode is unclear.  The have appear to be respiratory in origin (at least the 1st episode).  We will continue to monitor carefully.  We are awaiting evaluation by Cardiology.      CAD (coronary artery disease)     s/p stentimg 2003 (2),2009 (1)    Carotid artery stenosis     Diabetes mellitus type 2 in obese     HTN (hypertension), benign     Hyperlipidemia     Keloid cicatrix     NPDR (nonproliferative diabetic retinopathy) 8/17/2015    NSTEMI (non-ST elevated myocardial infarction)     Nuclear sclerosis - Right Eye 3/18/2014    Primary localized osteoarthrosis, lower leg 6/18/2014    Sleep apnea        Past Surgical History:   Procedure Laterality Date    BRONCHOSCOPY N/A 5/2/2019    Performed by [facility] at [facility] F[facility]    CARDIAC CATHETERIZATION      cataract extraction left eye      cataracts       CATHETERIZATION, HEART, LEFT Left 2014    Performed by Wilman Kim MD at [facility] CATH LAB     SECTION, LOW TRANSVERSE      CORONARY ANGIOPLASTY      Creation, Nephrostomy, Percutaneous Left 2019    Performed by Karina Surgeon at [facility] KARINA    CREATION, TRACHEOSTOMY N/A 2019    Performed by Sean Ruano MD at [facility] OR 2ND FLR    EGD, WITH PEG TUBE INSERTION  2019    Performed by Sean Ruano MD at [facility] OR 2ND FLR    ESOPHAGOGASTRODUODENOSCOPY (EGD) N/A 2016    Performed by Gardenia Adamson MD at [facility] ENDO (4TH FLR)    EXCISION TURBINATE, SUBMUCOUS      FUSION, SPINE, LUMBAR, ANTERIOR APPROACH Left L5-S1 Anterior to Psoa Interbody Fusion, L5-S1 Posterior Instrumentation Left 2019    Performed by Mk George MD at [facility] OR 2ND FLR    HAND SURGERY Left     HAND SURGERY Right     torn ligament repair/ Dr. Yeboah    HYSTERECTOMY      Injection,steroid,epidural,transforaminal approach - Bilateral - S1 Bilateral 2018    Performed by Tl Abreu MD at [facility] PAIN MGT    Injection-steroid-epidural-caudal N/A 2019    Performed by Dave Bentley Jr., MD at [facility] PAIN MGT    INSERTION-INTRAOCULAR LENS (IOL) Right 2015    Performed by Good Domingo MD at [facility] OR 1ST FLR    LAPAROTOMY, EXPLORATORY; LIGATION OF LEFT URETER; DOUBLE J STENT REMOVAL LEFT URETER Left 2019    Performed by Paul Carlson MD at [facility] OR 2ND FLR    left foot surgery      left wrist surgery      NASAL SEPTUM SURGERY  5/7/15    PHACOEMULSIFICATION-ASPIRATION-CATARACT Right 2015    Performed by Good Domingo MD at [facility] OR 1ST FLR    REPAIR, URETER  2019    Performed by Mk George MD at [facility] OR 2ND FLR    RESECTION-TURBINATES (SMR) N/A 2015    Performed by Dileep Dubois III, MD at [facility] OR 2ND FLR    rt elbow surgery      S/P LAD COATED STENT  2010    6 total     S/P OM1 STENT  2003    SEPTOPLASTY N/A  "5/7/2015    Performed by Dileep Dubois III, MD at [facility] OR 2ND FLR    SIGMOIDOSCOPY, FLEXIBLE N/A 5/13/2019    Performed by ALBERTO Amin MD at [facility] ENDO (2ND FLR)    SINUS SURGERY      F.E.S.S.    SINUS SURGERY FUNCTIONAL ENDOSCOPIC WITH NAVIGATION WITH MAXILLARIES, ETHMOIDS, LEFT SPHENOID, LEFT LOLY N/A 5/7/2015    Performed by Dileep Dubois III, MD at [facility] OR 2ND FLR    STENT, URETERAL placement  4/12/2019    Performed by Robin Boyd MD at [facility] OR 2ND FLR    TUBAL LIGATION         Review of patient's allergies indicates:  No Known Allergies    Medications:  Medications Prior to Admission   Medication Sig    albuterol (PROVENTIL/VENTOLIN HFA) 90 mcg/actuation inhaler Inhale 2 puffs into the lungs every 6 (six) hours as needed for Wheezing.    amLODIPine (NORVASC) 10 MG tablet TAKE 1 TABLET (10 MG TOTAL) BY MOUTH ONCE DAILY.    aspirin 81 mg Tab Take 81 mg by mouth. 1 Tablet Oral Every day    atorvastatin (LIPITOR) 40 MG tablet TAKE 1 TABLET (40 MG TOTAL) BY MOUTH ONCE DAILY.    ACCU-CHEK EDIN PLUS METER [facility]     BD INSULIN PEN NEEDLE UF MINI 31 x 3/16 " Ndle USE 4 TIMES A DAY    blood sugar diagnostic (ACCU-CHEK EDIN PLUS TEST STRP) Strp TEST BLOOD SUGARS 4 TIMES DAILY    chlorthalidone (HYGROTEN) 50 MG Tab Take 1 tablet (50 mg total) by mouth once daily.    esomeprazole (NEXIUM) 40 MG capsule TAKE 1 CAPSULE (40 MG TOTAL) BY MOUTH BEFORE BREAKFAST.    fenofibrate micronized (LOFIBRA) 134 MG Cap TAKE 1 CAPSULE (134 MG TOTAL) BY MOUTH BEFORE BREAKFAST.    flash glucose scanning reader (FREESTYLE MISTI 14 DAY READER) Misc 1 each by Misc.(Non-Drug; Combo Route) route once daily.    flash glucose sensor (FREESTYLE MISTI 14 DAY SENSOR) Kit 1 each by Misc.(Non-Drug; Combo Route) route once daily.    gabapentin (NEURONTIN) 100 MG capsule Take 2 capsules (200 mg total) by mouth every evening.    insulin aspart U-100 (NOVOLOG FLEXPEN U-100 INSULIN) 100 unit/mL InPn pen INJECT 35 U AM, 45 U AT " "NOON, 35 U PM.150-200 +2, 201-250 +4, 251-300 +6, 301-350 +8, >350 +10    insulin glargine, TOUJEO, (TOUJEO SOLOSTAR U-300 INSULIN) 300 unit/mL (1.5 mL) InPn pen Inject 50 Units into the skin 2 (two) times daily.    INVOKANA 300 mg Tab tablet Take 1 tablet (300 mg total) by mouth once daily.    irbesartan (AVAPRO) 300 MG tablet Take 1 tablet (300 mg total) by mouth every evening.    lancets 30 gauge Misc     metoprolol succinate (TOPROL-XL) 100 MG 24 hr tablet TAKE 1 TABLET (100 MG TOTAL) BY MOUTH ONCE DAILY.    nitroGLYCERIN (NITROSTAT) 0.4 MG SL tablet Take one every 2-3 min till chestpain relief for 3 times and if still no relief, call MD or come to ED    omega-3 acid ethyl esters (LOVAZA) 1 gram capsule Take 1 g by mouth once daily.     pen needle, diabetic (BD ULTRA-FINE GRABIEL PEN NEEDLES) 32 gauge x 5/32" Ndle For use with insulin pens daily 4 times a day    prasugrel (EFFIENT) 10 mg Tab TAKE 1 TABLET (10 MG TOTAL) BY MOUTH ONCE DAILY.    tamsulosin (FLOMAX) 0.4 mg Cap Take 1 capsule (0.4 mg total) by mouth every evening.    tramadol (ULTRAM) 50 mg tablet Take 1 tablet (50 mg total) by mouth 3 (three) times daily as needed for Pain.    TRULICITY 1.5 mg/0.5 mL PnIj INJECT 1.5 MG INTO THE SKIN EVERY 7 DAYS.    zolpidem (AMBIEN) 5 MG Tab Take 1 tablet (5 mg total) by mouth nightly as needed.    [DISCONTINUED] cyclobenzaprine (FLEXERIL) 10 MG tablet TAKE 1 TABLET BY MOUTH 3 TIMES A DAY AS NEEDED FOR MUSCLE SPASMS. NO WORKING OR DRIVING ON THIS MED     Antibiotics (From admission, onward)    Start     Stop Route Frequency Ordered    05/12/19 1015  ceFAZolin injection 2 g      -- IV Every 8 hours (non-standard times) 05/12/19 0910    05/07/19 1500  rifAMpin (RIFADIN) 300 mg in sodium chloride 0.9% 100 mL IVPB      -- IV Every 12 hours (non-standard times) 05/07/19 1249        Antifungals (From admission, onward)    Start     Stop Route Frequency Ordered    05/16/19 0900  fluconazole 40 mg/ml suspension " 200 mg      05/30 0859 PER G TUBE Daily 05/16/19 0641    05/09/19 0930  miconazole nitrate 2% ointment      -- Top 2 times daily 05/09/19 0822        Antivirals (From admission, onward)    None           Immunization History   Administered Date(s) Administered    Influenza 11/19/2009    Influenza - Quadrivalent 10/10/2014, 10/09/2015, 10/24/2016    Influenza - Quadrivalent - PF 11/27/2017    Influenza A (H1N1) 2009 Monovalent - IM 11/19/2009    Pneumococcal Polysaccharide - 23 Valent 06/19/2014    Td - PF (ADULT) 04/17/2017       Family History     Problem Relation (Age of Onset)    Diabetes Mother, Father, Sister, Brother, Sister    Heart attack Father    Heart disease Mother    Leukemia Father    No Known Problems Sister, Brother, Brother, Maternal Grandmother, Maternal Grandfather, Paternal Grandmother, Paternal Grandfather, Son, Son, Maternal Aunt, Maternal Uncle, Paternal Aunt, Paternal Uncle        Social History     Socioeconomic History    Marital status:      Spouse name: Shamir    Number of children: 2    Years of education: Not on file    Highest education level: Not on file   Occupational History    Occupation: cafeteria     Employer: [employer]     Employer: [employer]     Employer: [employer]   Social Needs    Financial resource strain: Not on file    Food insecurity:     Worry: Not on file     Inability: Not on file    Transportation needs:     Medical: Not on file     Non-medical: Not on file   Tobacco Use    Smoking status: Never Smoker    Smokeless tobacco: Never Used   Substance and Sexual Activity    Alcohol use: No     Alcohol/week: 0.0 oz    Drug use: No    Sexual activity: Yes     Partners: Male     Birth control/protection: Post-menopausal     Comment:    Lifestyle    Physical activity:     Days per week: Not on file     Minutes per session: Not on file    Stress: Not on file   Relationships    Social connections:      Talks on phone: Not on file     Gets together: Not on file     Attends [employer] service: Not on file     Active member of club or organization: Not on file     Attends meetings of clubs or organizations: Not on file     Relationship status: Not on file   Other Topics Concern    Are you pregnant or think you may be? No    Breast-feeding No   Social History Narrative    . 2 children.      Review of Systems   Unable to perform ROS: Intubated (see HPI)     Objective:     Vital Signs (Most Recent):  Temp: (!) 101.3 °F (38.5 °C) (05/19/19 1500)  Pulse: 88 (05/19/19 1815)  Resp: (!) 26 (05/19/19 1815)  BP: (!) 104/55 (05/19/19 1815)  SpO2: 100 % (05/19/19 1815) Vital Signs (24h Range):  Temp:  [98.5 °F (36.9 °C)-101.3 °F (38.5 °C)] 101.3 °F (38.5 °C)  Pulse:  [] 88  Resp:  [14-41] 26  SpO2:  [92 %-100 %] 100 %  BP: ()/() 104/55  Arterial Line BP: (141-180)/(64-78) 180/78     Weight: 76 kg (167 lb 8.8 oz)  Body mass index is 27.88 kg/m².    Estimated Creatinine Clearance: 52.1 mL/min (based on SCr of 1.2 mg/dL).    Physical Exam   Constitutional: She is oriented to person, place, and time. She appears well-developed and well-nourished. No distress.       HENT:   Head: Normocephalic and atraumatic.   Cardiovascular: Normal rate, regular rhythm and normal heart sounds. Exam reveals no gallop and no friction rub.   No murmur heard.  Pulmonary/Chest: Effort normal and breath sounds normal. No stridor. No respiratory distress. She has no wheezes. She has no rales.   Abdominal: Soft. Bowel sounds are normal. She exhibits no distension and no mass. There is no tenderness. There is no rebound and no guarding.   Musculoskeletal: She exhibits no edema.   Neurological: She is alert and oriented to person, place, and time.   Skin: Skin is warm and dry. She is not diaphoretic.   Psychiatric: She has a normal mood and affect. Her behavior is normal.             Significant Labs:   Blood Culture:   Recent Labs    Lab 04/24/19  0950 04/30/19  0230 04/30/19  0247 05/10/19  1015 05/10/19  1020   LABBLOO No growth after 5 days. No growth after 5 days. No growth after 5 days. No growth after 5 days. No growth after 5 days.     CBC:   Recent Labs   Lab 05/18/19  0256 05/19/19  0336 05/19/19  0549 05/19/19  1843   WBC 14.37* 18.52*  --   --    HGB 7.5* 6.5*  --  7.9*   HCT 23.3* 20.7* 19* 23.3*    246  --   --      CMP:   Recent Labs   Lab 05/18/19  0256 05/19/19  0336   * 144   K 4.3 4.1   * 113*   CO2 24 23   * 175*   BUN 35* 41*   CREATININE 0.9 1.2   CALCIUM 8.7 8.8   PROT 5.4* 5.6*   ALBUMIN 1.5* 1.7*   BILITOT 0.4 0.5   ALKPHOS 96 102   AST 19 18   ALT <5* <5*   ANIONGAP 8 8   EGFRNONAA >60.0 49.9*     Respiratory Culture:   Recent Labs   Lab 04/30/19  0900 05/08/19  1300 05/19/19  1130   GSRESP <10 epithelial cells per low power field.  No WBC's  No organisms seen Rare WBC's  No organisms seen Few WBC's  Few Gram negative rods   RESPIRATORYC  --  No growth  --      Wound Culture:   Recent Labs   Lab 04/21/19  0125   LABAERO STAPHYLOCOCCUS LUGDUNENSIS  Rare       All pertinent labs within the past 24 hours have been reviewed.    Significant Imaging: I have reviewed all pertinent imaging results/findings within the past 24 hours.   X-Ray Chest AP Portable [[id number]] Resulted: 05/19/19 1513   Order Status: Completed Updated: 05/19/19 1516   Narrative:     EXAMINATION:  XR CHEST AP PORTABLE    CLINICAL HISTORY:  respiratory distress;    TECHNIQUE:  Single frontal view of the chest was performed.    COMPARISON:  05/19/2019 at 05:11    FINDINGS:  Tracheostomy tube in the clavicular heads.    Diffuse alveolar filling process unchanged.  No pneumothorax or pleural effusion.  The cardiomediastinal silhouette, osseous and soft tissue structures are stable.   Impression:       No significant interval change in what is likely diffuse alveolar pulmonary edema.  Infection can have a similar  appearance.      Electronically signed by: Sharon Bro MD  Date: 05/19/2019  Time: 15:13   X-Ray Chest AP Portable [[id number]] Resulted: 05/19/19 0558   Order Status: Completed Updated: 05/19/19 0600   Narrative:     EXAMINATION:  XR CHEST AP PORTABLE    CLINICAL HISTORY:  trach;    TECHNIQUE:  Single frontal view of the chest was performed.    COMPARISON:  05/18/2019    FINDINGS:  Tracheostomy cannula projects in stable radiographic position.  Cardiomediastinal silhouette is unchanged.  The lungs demonstrate persistent diffuse increased interstitial attenuation and perihilar opacities, similar to prior examination.  No evidence of pneumothorax.   Impression:       No significant detrimental interval change compared to 05/18/2019.      Electronically signed by: Al Wu MD  Date: 05/19/2019  Time: 05:58   X-Ray Chest AP Portable [[id number]] Resulted: 05/18/19 0751   Order Status: Completed Updated: 05/18/19 0754   Narrative:     EXAMINATION:  XR CHEST AP PORTABLE    CLINICAL HISTORY:  trach;    TECHNIQUE:  Single frontal view of the chest was performed.    COMPARISON:  Chest AP portable dated 05/17/2019    FINDINGS:  Tracheostomy tube again noted.  Coarsened, central predominant interstitial attenuation with improved left perihilar infiltrate and new left basilar opacity.  Heart size is similar.  No pneumothorax or free air.   Impression:       As above      Electronically signed by: Tristan Flores  Date: 05/18/2019  Time: 07:51   X-Ray Chest AP Portable [[id number]] Resulted: 05/17/19 0903   Order Status: Completed Updated: 05/17/19 0905   Narrative:     EXAMINATION:  XR CHEST AP PORTABLE    CLINICAL HISTORY:  trach;    TECHNIQUE:  Single frontal view of the chest was performed.    COMPARISON:  No 05/16/2019 ne      Electronically signed by: Christian Valencia MD  Date: 05/17/2019  Time: 09:03   X-Ray Chest 1 View [[id number]] Resulted: 05/16/19 0747   Order Status: Completed Updated: 05/16/19 0750    Narrative:     EXAMINATION:  XR CHEST 1 VIEW    CLINICAL HISTORY:  mechanically assisted;    TECHNIQUE:  Single frontal view of the chest was performed.    COMPARISON:  05/15/2019    FINDINGS:  Tracheostomy tube is present vascular catheters in place.  Heart size is unchanged.  There is little infiltrate present in the perihilar area on the left.  No pneumothorax.   Impression:       See above      Electronically signed by: Florin Cooley MD  Date: 05/16/2019  Time: 07:47   X-Ray Chest 1 View [[id number]] Resulted: 05/15/19 0726   Order Status: Completed Updated: 05/15/19 0729   Narrative:     EXAMINATION:  XR CHEST 1 VIEW    CLINICAL HISTORY:  mechanically assisted;    COMPARISON:  Comparison is made to 05/14/2019.    FINDINGS:  No significant interval change in the appearance of the chest since 05/14/2019 is appreciated, with the current examination demonstrating a slightly improved inspiratory depth level.  No pneumothorax.   Impression:       As above      Electronically signed by: Christian Moreno MD  Date: 05/15/2019  Time: 07:26   X-Ray Chest 1 View [[id number]] Resulted: 05/14/19 0744   Order Status: Completed Updated: 05/14/19 0747   Narrative:     EXAMINATION:  XR CHEST 1 VIEW    CLINICAL HISTORY:  mechanically assisted;    COMPARISON:  Comparison is made to 05/13/2019.    FINDINGS:  Tracheostomy cannula again identified, as is a vascular catheter entering from a left jugular approach and having its tip near the junction of the right and left brachiocephalic veins.  Heart size and the appearance of the cardiomediastinal silhouette are unchanged since the examination referenced above.  Lung zones also appear stable, with no significant new areas of airspace consolidation or volume loss having developed on either side.  No pleural fluid of any substantial volume is seen on either side.  No pneumothorax.   Impression:       Allowing for a considerably poorer inspiratory depth level on the current examination, no  significant detrimental interval change in the appearance of the chest since 05/13/2019 is observed.      Electronically signed by: Christian Moreno MD  Date: 05/14/2019  Time: 07:44   X-Ray Chest 1 View [[id number]] Resulted: 05/13/19 0822   Order Status: Completed Updated: 05/13/19 0824   Narrative:     EXAMINATION:  XR CHEST 1 VIEW    CLINICAL HISTORY:  Pulm Edema;    TECHNIQUE:  Single frontal view of the chest was performed.    COMPARISON:  None    FINDINGS:  Tracheostomy tube identified.  Central venous catheter in the SVC.  Mild cardiomegaly.  Perihilar alveolar filling slightly more on the left side possible edema.  No significant pleural effusion.   Impression:       See above      Electronically signed by: Christian Valencia MD  Date: 05/13/2019  Time: 08:22

## 2019-05-20 NOTE — SUBJECTIVE & OBJECTIVE
Interval History/Significant Events:   2U PRBC yesterday with incomplete response. BRBPR again this morning. CRS packed surgicel; possible scope this PM.    Desat yesterday and placed back on vent with rate.     Febrile again. Abx changed per ID to vanc/cefepime and continued on rifampin. BAL with few GNR.      Good UO via Fontenot and nephrostomy. Cr stable. Got lasix 40 yesterday. Another lasix 20 this morning.     Follow-up For: Procedure(s) (LRB):  SIGMOIDOSCOPY, FLEXIBLE (N/A)    Post-Operative Day: 7 Days Post-Op    Objective:     Vital Signs (Most Recent):  Temp: (!) 100.4 °F (38 °C) (05/20/19 0700)  Pulse: 88 (05/20/19 0930)  Resp: (!) 23 (05/20/19 0930)  BP: (!) 108/56 (05/20/19 0854)  SpO2: 100 % (05/20/19 0930) Vital Signs (24h Range):  Temp:  [98.8 °F (37.1 °C)-102.8 °F (39.3 °C)] 100.4 °F (38 °C)  Pulse:  [] 88  Resp:  [14-38] 23  SpO2:  [89 %-100 %] 100 %  BP: ()/() 108/56  Arterial Line BP: ()/(36-78) 92/50     Weight: 76 kg (167 lb 8.8 oz)  Body mass index is 27.88 kg/m².      Intake/Output Summary (Last 24 hours) at 5/20/2019 1006  Last data filed at 5/20/2019 0900  Gross per 24 hour   Intake 2033 ml   Output 1895 ml   Net 138 ml       Physical Exam   Constitutional: She appears well-developed and well-nourished.   HENT:   Head: Normocephalic and atraumatic.   Tracheostomy in place   Eyes: Right eye exhibits no discharge. Left eye exhibits no discharge.   Neck: Normal range of motion. Neck supple.   Cardiovascular: Normal rate and regular rhythm.   Pulmonary/Chest: Effort normal. No respiratory distress.   Abdominal: Soft.   Midline incision c/d/i.  PEG with no leak. Abdomen soft, non-distended.  Bowel sounds present   Genitourinary:   Genitourinary Comments: Nephrostomy tube in place with yellow output.  Fontenot in place   Musculoskeletal: Normal range of motion.   Neurological: She is alert.   Able to follow commands, denying pain.    Skin: Skin is warm and dry.   Psychiatric:  She has a normal mood and affect.   Nursing note and vitals reviewed.      Vents:  Vent Mode: A/C (05/20/19 0915)  Ventilator Initiated: Yes (05/08/19 0630)  Set Rate: 20 bmp (05/20/19 0915)  Vt Set: 400 mL (05/20/19 0915)  Pressure Support: 10 cmH20 (05/19/19 1400)  PEEP/CPAP: 10 cmH20 (05/20/19 0915)  Oxygen Concentration (%): 61 (05/20/19 0930)  Peak Airway Pressure: 27 cmH2O (05/20/19 0915)  Plateau Pressure: 0 cmH20 (05/20/19 0915)  Total Ve: 7.26 mL (05/20/19 0915)  Negative Inspiratory Force (cm H2O): -18 (04/27/19 1306)  F/VT Ratio<105 (RSBI): (!) 76.22 (05/20/19 0915)    Lines/Drains/Airways     Drain                 Nephrostomy 04/21/19 0629 Left 8 Fr. 29 days         Gastrostomy/Enterostomy 05/02/19 1134 Percutaneous endoscopic gastrostomy (PEG) LUQ decompression;feeding 17 days         Urethral Catheter 05/16/19 1040 16 Fr. 3 days          Airway                 Surgical Airway 05/02/19 1107 Shiley Cuffed 17 days          Arterial Line                 Arterial Line 05/19/19 1830 Right Radial less than 1 day          Peripheral Intravenous Line                 Midline Catheter Insertion/Assessment  - Single Lumen 05/07/19 1632 Left basilic vein (medial side of arm) 18g x 8cm 12 days         Peripheral IV - Single Lumen 05/17/19 1630 20 G;1 3/4 in Right Upper Arm 2 days                Significant Labs:    CBC/Anemia Profile:  Recent Labs   Lab 05/19/19  0336  05/19/19  1843 05/20/19  0358 05/20/19  0546   WBC 18.52*  --   --  24.57* 23.19*   HGB 6.5*  --  7.9* 7.7* 7.5*   HCT 20.7*   < > 23.3* 23.3* 23.0*     --   --  214 212   MCV 91  --   --  88 90   RDW 15.0*  --   --  14.7* 15.0*    < > = values in this interval not displayed.        Chemistries:  Recent Labs   Lab 05/19/19  0336 05/19/19  2030 05/20/19  0358    143 140   K 4.1 4.0 3.7   * 113* 111*   CO2 23 22* 20*   BUN 41* 43* 43*   CREATININE 1.2 1.1 1.0   CALCIUM 8.8 8.6* 8.5*   ALBUMIN 1.7* 1.6* 1.6*   PROT 5.6* 5.7* 5.8*    BILITOT 0.5 0.9 0.8   ALKPHOS 102 114 122   ALT <5* <5* 6*   AST 18 25 24   MG 2.0  --  1.5*   PHOS 2.7  --  2.4*       All pertinent labs within the past 24 hours have been reviewed.    Significant Imaging:  I have reviewed all pertinent imaging results/findings within the past 24 hours.

## 2019-05-20 NOTE — SUBJECTIVE & OBJECTIVE
Past Medical History:   Diagnosis Date    Anticoagulant long-term use     Asthma     Back pain     Bradycardia, unspecified 2019    The etiology of the bradycardic episode is unclear.  The have appear to be respiratory in origin (at least the 1st episode).  We will continue to monitor carefully.  We are awaiting evaluation by Cardiology.      CAD (coronary artery disease)     s/p stentimg  (2), (1)    Carotid artery stenosis     Diabetes mellitus type 2 in obese     HTN (hypertension), benign     Hyperlipidemia     Keloid cicatrix     NPDR (nonproliferative diabetic retinopathy) 2015    NSTEMI (non-ST elevated myocardial infarction)     Nuclear sclerosis - Right Eye 3/18/2014    Primary localized osteoarthrosis, lower leg 2014    Sleep apnea        Past Surgical History:   Procedure Laterality Date    BRONCHOSCOPY N/A 2019    Performed by Sean [facility]ano MD at [facility] OR 2ND FLR    CARDIAC CATHETERIZATION      cataract extraction left eye      cataracts      CATHETERIZATION, HEART, LEFT Left 2014    Performed by Wilman Kim MD at [facility] CATH LAB     SECTION, LOW TRANSVERSE      CORONARY ANGIOPLASTY      Creation, Nephrostomy, Percutaneous Left 2019    Performed by Karina Surgeon at [facility] KARINA    CREATION, TRACHEOSTOMY N/A 2019    Performed by Sean Ruano MD at [facility] OR 2ND FLR    EGD, WITH PEG TUBE INSERTION  2019    Performed by Sean Ruano MD at [facility] OR 2ND FLR    ESOPHAGOGASTRODUODENOSCOPY (EGD) N/A 2016    Performed by Gadrenia Adasmon MD at [facility] ENDO (4TH FLR)    EXCISION TURBINATE, SUBMUCOUS      FUSION, SPINE, LUMBAR, ANTERIOR APPROACH Left L5-S1 Anterior to Psoa Interbody Fusion, L5-S1 Posterior Instrumentation Left 2019    Performed by Mk George MD at [facility] OR 2ND FLR    HAND SURGERY Left     HAND SURGERY Right     torn ligament repair/ Dr. Yeboah    HYSTERECTOMY       Injection,steroid,epidural,transforaminal approach - Bilateral - S1 Bilateral 9/25/2018    Performed by Tl [facility]breu MD at [facility] PAIN MGT    Injection-steroid-epidural-caudal N/A 2/7/2019    Performed by Dave Bentley Jr., MD at [facility] PAIN MGT    INSERTION-INTRAOCULAR LENS (IOL) Right 9/1/2015    Performed by Good Domingo MD at [facility] OR 1ST FLR    LAPAROTOMY, EXPLORATORY; LIGATION OF LEFT URETER; DOUBLE J STENT REMOVAL LEFT URETER Left 4/20/2019    Performed by Paul Carlson MD at [facility] OR 2ND FLR    left foot surgery      left wrist surgery      NASAL SEPTUM SURGERY  5/7/15    PHACOEMULSIFICATION-ASPIRATION-CATARACT Right 9/1/2015    Performed by Good Domingo MD at [facility] OR 1ST FLR    REPAIR, URETER  4/12/2019    Performed by Mk George MD at [facility] OR 2ND FLR    RESECTION-TURBINATES (SMR) N/A 5/7/2015    Performed by Dileep Dubois III, MD at [facility] OR 2ND FLR    rt elbow surgery      S/P LAD COATED STENT  05/14/2010    6 total     S/P OM1 STENT  08/2003    SEPTOPLASTY N/A 5/7/2015    Performed by Dileep Dubois III, MD at [facility] OR 2ND [facility]    SIGMOIDOSCOPY, FLEXIBLE N/A 5/13/2019    Performed by ALBERTO Amin MD at [facility] ENDO (2ND FLR)    SINUS SURGERY      F.E.S.S.    SINUS SURGERY FUNCTIONAL ENDOSCOPIC WITH NAVIGATION WITH MAXILLARIES, ETHMOIDS, LEFT SPHENOID, LEFT LOLY N/A 5/7/2015    Performed by Dileep Dubois III, MD at [facility] OR 2ND FLR    STENT, URETERAL placement  4/12/2019    Performed by Robin oByd MD at [facility] OR 2ND FLR    TUBAL LIGATION         Review of patient's allergies indicates:  No Known Allergies    Medications:  Medications Prior to Admission   Medication Sig    albuterol (PROVENTIL/VENTOLIN HFA) 90 mcg/actuation inhaler Inhale 2 puffs into the lungs every 6 (six) hours as needed for Wheezing.    amLODIPine (NORVASC) 10 MG tablet TAKE 1 TABLET (10 MG TOTAL) BY MOUTH ONCE DAILY.    aspirin 81 mg Tab Take 81 mg by mouth. 1 Tablet Oral  "Every day    atorvastatin (LIPITOR) 40 MG tablet TAKE 1 TABLET (40 MG TOTAL) BY MOUTH ONCE DAILY.    ACCU-CHEK EDIN PLUS METER [facility]     BD INSULIN PEN NEEDLE UF MINI 31 x 3/16 " Ndle USE 4 TIMES A DAY    blood sugar diagnostic (ACCU-CHEK EDIN PLUS TEST STRP) Strp TEST BLOOD SUGARS 4 TIMES DAILY    chlorthalidone (HYGROTEN) 50 MG Tab Take 1 tablet (50 mg total) by mouth once daily.    esomeprazole (NEXIUM) 40 MG capsule TAKE 1 CAPSULE (40 MG TOTAL) BY MOUTH BEFORE BREAKFAST.    fenofibrate micronized (LOFIBRA) 134 MG Cap TAKE 1 CAPSULE (134 MG TOTAL) BY MOUTH BEFORE BREAKFAST.    flash glucose scanning reader (FREESTYLE MISTI 14 DAY READER) Misc 1 each by Misc.(Non-Drug; Combo Route) route once daily.    flash glucose sensor (FREESTYLE MISTI 14 DAY SENSOR) Kit 1 each by Misc.(Non-Drug; Combo Route) route once daily.    gabapentin (NEURONTIN) 100 MG capsule Take 2 capsules (200 mg total) by mouth every evening.    insulin aspart U-100 (NOVOLOG FLEXPEN U-100 INSULIN) 100 unit/mL InPn pen INJECT 35 U AM, 45 U AT NOON, 35 U PM.150-200 +2, 201-250 +4, 251-300 +6, 301-350 +8, >350 +10    insulin glargine, TOUJEO, (TOUJEO SOLOSTAR U-300 INSULIN) 300 unit/mL (1.5 mL) InPn pen Inject 50 Units into the skin 2 (two) times daily.    INVOKANA 300 mg Tab tablet Take 1 tablet (300 mg total) by mouth once daily.    irbesartan (AVAPRO) 300 MG tablet Take 1 tablet (300 mg total) by mouth every evening.    lancets 30 gauge Misc     metoprolol succinate (TOPROL-XL) 100 MG 24 hr tablet TAKE 1 TABLET (100 MG TOTAL) BY MOUTH ONCE DAILY.    nitroGLYCERIN (NITROSTAT) 0.4 MG SL tablet Take one every 2-3 min till chestpain relief for 3 times and if still no relief, call MD or come to ED    omega-3 acid ethyl esters (LOVAZA) 1 gram capsule Take 1 g by mouth once daily.     pen needle, diabetic (BD ULTRA-FINE GRABIEL PEN NEEDLES) 32 gauge x 5/32" Ndle For use with insulin pens daily 4 times a day    prasugrel (EFFIENT) 10 mg " Tab TAKE 1 TABLET (10 MG TOTAL) BY MOUTH ONCE DAILY.    tamsulosin (FLOMAX) 0.4 mg Cap Take 1 capsule (0.4 mg total) by mouth every evening.    tramadol (ULTRAM) 50 mg tablet Take 1 tablet (50 mg total) by mouth 3 (three) times daily as needed for Pain.    TRULICITY 1.5 mg/0.5 mL PnIj INJECT 1.5 MG INTO THE SKIN EVERY 7 DAYS.    zolpidem (AMBIEN) 5 MG Tab Take 1 tablet (5 mg total) by mouth nightly as needed.    [DISCONTINUED] cyclobenzaprine (FLEXERIL) 10 MG tablet TAKE 1 TABLET BY MOUTH 3 TIMES A DAY AS NEEDED FOR MUSCLE SPASMS. NO WORKING OR DRIVING ON THIS MED     Antibiotics (From admission, onward)    Start     Stop Route Frequency Ordered    05/12/19 1015  ceFAZolin injection 2 g      -- IV Every 8 hours (non-standard times) 05/12/19 0910    05/07/19 1500  rifAMpin (RIFADIN) 300 mg in sodium chloride 0.9% 100 mL IVPB      -- IV Every 12 hours (non-standard times) 05/07/19 1249        Antifungals (From admission, onward)    Start     Stop Route Frequency Ordered    05/16/19 0900  fluconazole 40 mg/ml suspension 200 mg      05/30 0859 PER G TUBE Daily 05/16/19 0641    05/09/19 0930  miconazole nitrate 2% ointment      -- Top 2 times daily 05/09/19 0822        Antivirals (From admission, onward)    None           Immunization History   Administered Date(s) Administered    Influenza 11/19/2009    Influenza - Quadrivalent 10/10/2014, 10/09/2015, 10/24/2016    Influenza - Quadrivalent - PF 11/27/2017    Influenza A (H1N1) 2009 Monovalent - IM 11/19/2009    Pneumococcal Polysaccharide - 23 Valent 06/19/2014    Td - PF (ADULT) 04/17/2017       Family History     Problem Relation (Age of Onset)    Diabetes Mother, Father, Sister, Brother, Sister    Heart attack Father    Heart disease Mother    Leukemia Father    No Known Problems Sister, Brother, Brother, Maternal Grandmother, Maternal Grandfather, Paternal Grandmother, Paternal Grandfather, Son, Son, Maternal Aunt, Maternal Uncle, Paternal Aunt, Paternal  Uncle        Social History     Socioeconomic History    Marital status:      Spouse name: Shamir    Number of children: 2    Years of education: Not on file    Highest education level: Not on file   Occupational History    Occupation: [employer]     Employer: [employer]     Employer: [employer]     Employer: [employer]   Social Needs    Financial resource strain: Not on file    Food insecurity:     Worry: Not on file     Inability: Not on file    Transportation needs:     Medical: Not on file     Non-medical: Not on file   Tobacco Use    Smoking status: Never Smoker    Smokeless tobacco: Never Used   Substance and Sexual Activity    Alcohol use: No     Alcohol/week: 0.0 oz    Drug use: No    Sexual activity: Yes     Partners: Male     Birth control/protection: Post-menopausal     Comment:    Lifestyle    Physical activity:     Days per week: Not on file     Minutes per session: Not on file    Stress: Not on file   Relationships    Social connections:     Talks on phone: Not on file     Gets together: Not on file     Attends [employer] service: Not on file     Active member of club or organization: Not on file     Attends meetings of clubs or organizations: Not on file     Relationship status: Not on file   Other Topics Concern    Are you pregnant or think you may be? No    Breast-feeding No   Social History Narrative    . 2 children.      Review of Systems   Unable to perform ROS: Intubated (see HPI)     Objective:     Vital Signs (Most Recent):  Temp: (!) 101.3 °F (38.5 °C) (05/19/19 1500)  Pulse: 88 (05/19/19 1815)  Resp: (!) 26 (05/19/19 1815)  BP: (!) 104/55 (05/19/19 1815)  SpO2: 100 % (05/19/19 1815) Vital Signs (24h Range):  Temp:  [98.5 °F (36.9 °C)-101.3 °F (38.5 °C)] 101.3 °F (38.5 °C)  Pulse:  [] 88  Resp:  [14-41] 26  SpO2:  [92 %-100 %] 100 %  BP: ()/() 104/55  Arterial Line BP: (141-180)/(64-78) 180/78      Weight: 76 kg (167 lb 8.8 oz)  Body mass index is 27.88 kg/m².    Estimated Creatinine Clearance: 52.1 mL/min (based on SCr of 1.2 mg/dL).    Physical Exam   Constitutional: She is oriented to person, place, and time. She appears well-developed and well-nourished. No distress.       HENT:   Head: Normocephalic and atraumatic.   Cardiovascular: Normal rate, regular rhythm and normal heart sounds. Exam reveals no gallop and no friction rub.   No murmur heard.  Pulmonary/Chest: Effort normal and breath sounds normal. No stridor. No respiratory distress. She has no wheezes. She has no rales.   Abdominal: Soft. Bowel sounds are normal. She exhibits no distension and no mass. There is no tenderness. There is no rebound and no guarding.   Musculoskeletal: She exhibits no edema.   Neurological: She is alert and oriented to person, place, and time.   Skin: Skin is warm and dry. She is not diaphoretic.   Psychiatric: She has a normal mood and affect. Her behavior is normal.             Significant Labs:   Blood Culture:   Recent Labs   Lab 04/24/19  0950 04/30/19  0230 04/30/19  0247 05/10/19  1015 05/10/19  1020   LABBLOO No growth after 5 days. No growth after 5 days. No growth after 5 days. No growth after 5 days. No growth after 5 days.     CBC:   Recent Labs   Lab 05/18/19  0256 05/19/19  0336 05/19/19  0549 05/19/19  1843   WBC 14.37* 18.52*  --   --    HGB 7.5* 6.5*  --  7.9*   HCT 23.3* 20.7* 19* 23.3*    246  --   --      CMP:   Recent Labs   Lab 05/18/19  0256 05/19/19  0336   * 144   K 4.3 4.1   * 113*   CO2 24 23   * 175*   BUN 35* 41*   CREATININE 0.9 1.2   CALCIUM 8.7 8.8   PROT 5.4* 5.6*   ALBUMIN 1.5* 1.7*   BILITOT 0.4 0.5   ALKPHOS 96 102   AST 19 18   ALT <5* <5*   ANIONGAP 8 8   EGFRNONAA >60.0 49.9*     Respiratory Culture:   Recent Labs   Lab 04/30/19  0900 05/08/19  1300 05/19/19  1130   GSRESP <10 epithelial cells per low power field.  No WBC's  No organisms seen Rare  WBC's  No organisms seen Few WBC's  Few Gram negative rods   RESPIRATORYC  --  No growth  --      Wound Culture:   Recent Labs   Lab 04/21/19  0125   LABAERO STAPHYLOCOCCUS LUGDUNENSIS  Rare       All pertinent labs within the past 24 hours have been reviewed.    Significant Imaging: I have reviewed all pertinent imaging results/findings within the past 24 hours.   X-Ray Chest AP Portable [[id number]] Resulted: 05/19/19 1513   Order Status: Completed Updated: 05/19/19 1516   Narrative:     EXAMINATION:  XR CHEST AP PORTABLE    CLINICAL HISTORY:  respiratory distress;    TECHNIQUE:  Single frontal view of the chest was performed.    COMPARISON:  05/19/2019 at 05:11    FINDINGS:  Tracheostomy tube in the clavicular heads.    Diffuse alveolar filling process unchanged.  No pneumothorax or pleural effusion.  The cardiomediastinal silhouette, osseous and soft tissue structures are stable.   Impression:       No significant interval change in what is likely diffuse alveolar pulmonary edema.  Infection can have a similar appearance.      Electronically signed by: Sharon Bro MD  Date: 05/19/2019  Time: 15:13   X-Ray Chest AP Portable [[id number]] Resulted: 05/19/19 0558   Order Status: Completed Updated: 05/19/19 0600   Narrative:     EXAMINATION:  XR CHEST AP PORTABLE    CLINICAL HISTORY:  trach;    TECHNIQUE:  Single frontal view of the chest was performed.    COMPARISON:  05/18/2019    FINDINGS:  Tracheostomy cannula projects in stable radiographic position.  Cardiomediastinal silhouette is unchanged.  The lungs demonstrate persistent diffuse increased interstitial attenuation and perihilar opacities, similar to prior examination.  No evidence of pneumothorax.   Impression:       No significant detrimental interval change compared to 05/18/2019.      Electronically signed by: Al Wu MD  Date: 05/19/2019  Time: 05:58   X-Ray Chest AP Portable [[id number]] Resulted: 05/18/19 0751   Order Status: Completed  Updated: 05/18/19 0754   Narrative:     EXAMINATION:  XR CHEST AP PORTABLE    CLINICAL HISTORY:  trach;    TECHNIQUE:  Single frontal view of the chest was performed.    COMPARISON:  Chest AP portable dated 05/17/2019    FINDINGS:  Tracheostomy tube again noted.  Coarsened, central predominant interstitial attenuation with improved left perihilar infiltrate and new left basilar opacity.  Heart size is similar.  No pneumothorax or free air.   Impression:       As above      Electronically signed by: Tristan Flores  Date: 05/18/2019  Time: 07:51   X-Ray Chest AP Portable [[id number]] Resulted: 05/17/19 0903   Order Status: Completed Updated: 05/17/19 0905   Narrative:     EXAMINATION:  XR CHEST AP PORTABLE    CLINICAL HISTORY:  trach;    TECHNIQUE:  Single frontal view of the chest was performed.    COMPARISON:  No 05/16/2019 ne      Electronically signed by: Christian Valencia MD  Date: 05/17/2019  Time: 09:03   X-Ray Chest 1 View [[id number]] Resulted: 05/16/19 0747   Order Status: Completed Updated: 05/16/19 0750   Narrative:     EXAMINATION:  XR CHEST 1 VIEW    CLINICAL HISTORY:  mechanically assisted;    TECHNIQUE:  Single frontal view of the chest was performed.    COMPARISON:  05/15/2019    FINDINGS:  Tracheostomy tube is present vascular catheters in place.  Heart size is unchanged.  There is little infiltrate present in the perihilar area on the left.  No pneumothorax.   Impression:       See above      Electronically signed by: Florin oColey MD  Date: 05/16/2019  Time: 07:47   X-Ray Chest 1 View [[id number]] Resulted: 05/15/19 0726   Order Status: Completed Updated: 05/15/19 0729   Narrative:     EXAMINATION:  XR CHEST 1 VIEW    CLINICAL HISTORY:  mechanically assisted;    COMPARISON:  Comparison is made to 05/14/2019.    FINDINGS:  No significant interval change in the appearance of the chest since 05/14/2019 is appreciated, with the current examination demonstrating a slightly improved inspiratory depth  level.  No pneumothorax.   Impression:       As above      Electronically signed by: Christian Moreno MD  Date: 05/15/2019  Time: 07:26   X-Ray Chest 1 View [[id number]] Resulted: 05/14/19 0744   Order Status: Completed Updated: 05/14/19 0747   Narrative:     EXAMINATION:  XR CHEST 1 VIEW    CLINICAL HISTORY:  mechanically assisted;    COMPARISON:  Comparison is made to 05/13/2019.    FINDINGS:  Tracheostomy cannula again identified, as is a vascular catheter entering from a left jugular approach and having its tip near the junction of the right and left brachiocephalic veins.  Heart size and the appearance of the cardiomediastinal silhouette are unchanged since the examination referenced above.  Lung zones also appear stable, with no significant new areas of airspace consolidation or volume loss having developed on either side.  No pleural fluid of any substantial volume is seen on either side.  No pneumothorax.   Impression:       Allowing for a considerably poorer inspiratory depth level on the current examination, no significant detrimental interval change in the appearance of the chest since 05/13/2019 is observed.      Electronically signed by: Christian Moreno MD  Date: 05/14/2019  Time: 07:44   X-Ray Chest 1 View [[id number]] Resulted: 05/13/19 0822   Order Status: Completed Updated: 05/13/19 0824   Narrative:     EXAMINATION:  XR CHEST 1 VIEW    CLINICAL HISTORY:  Pulm Edema;    TECHNIQUE:  Single frontal view of the chest was performed.    COMPARISON:  None    FINDINGS:  Tracheostomy tube identified.  Central venous catheter in the SVC.  Mild cardiomegaly.  Perihilar alveolar filling slightly more on the left side possible edema.  No significant pleural effusion.   Impression:       See above      Electronically signed by: Christian Valencia MD  Date: 05/13/2019  Time: 08:22

## 2019-05-20 NOTE — PROGRESS NOTES
"[facility]  Endocrinology  Progress Note    Admit Date: 4/20/2019     Reason for Consult: Management of T2DM, Hyperglycemia     Surgical Procedure and Date:       04/21/2019:         1. Exploratory laparotomy        2. Ligation of left ureter        3. Removal of left JJ ureteral stent      Diabetes diagnosis year: ANDRE    Home Diabetes Medications:  ANDRE  Toujeo 50 BID  Invokana 300mg daily   Novolog 35 units AM, 45 units PM, 35 units PM    How often checking glucose at home? ANDRE   BG readings on regimen: ANDRE  Hypoglycemia on the regimen?  ANDRE  Missed doses on regimen?  ANDRE    Diabetes Complications include:     Hyperglycemia and Diabetic retinopathy     Complicating diabetes co morbidities:   History of MI and MURTAZA, CAD, HLD    HPI:   Patient is a 59 y.o. female with a diagnosis of HTN, HLN, DM type 2, nonproliferative diabetic renopathy, CAD, NSTEMI, and back pain who presents to the ED with a complaint of altered mental status on 04/20/2019. Is now s/p Exploratory laparotomy, Ligation of left ureter, and Removal of left JJ ureteral stent. Endocrinology consulted to manage DM2/Hyperglycemia.    Lab Results   Component Value Date    HGBA1C 10.8 (H) 02/19/2019       Interval HPI:   Overnight events:  BG remains above goal on current SQ novolog insulin regimen. Patient experienced intense rectal bleeding, and mild hypoxia overnight as per chart review.     Eating:   NPO  Nausea: No  Hypoglycemia and intervention: No  Fever: No  TPN and/or TF: Yes (TF)  If yes, type of TF/TPN and rate: 40 ml/hr    BP (!) 108/56 (BP Location: Right arm, Patient Position: Lying)   Pulse 88   Temp (!) 100.4 °F (38 °C) (Oral)   Resp (!) 23   Ht 5' 5" (1.651 m)   Wt 76 kg (167 lb 8.8 oz)   SpO2 100%   Breastfeeding? No   BMI 27.88 kg/m²      Labs Reviewed and Include    Recent Labs   Lab 05/20/19  0358   *   CALCIUM 8.5*   ALBUMIN 1.6*   PROT 5.8*      K 3.7   CO2 20*   *   BUN 43*   CREATININE " 1.0   ALKPHOS 122   ALT 6*   AST 24   BILITOT 0.8     Lab Results   Component Value Date    WBC 23.19 (H) 05/20/2019    HGB 7.5 (L) 05/20/2019    HCT 23.0 (L) 05/20/2019    MCV 90 05/20/2019     05/20/2019     No results for input(s): TSH, FREET4 in the last 168 hours.  Lab Results   Component Value Date    HGBA1C 10.8 (H) 02/19/2019       Nutritional status:   Body mass index is 27.88 kg/m².  Lab Results   Component Value Date    ALBUMIN 1.6 (L) 05/20/2019    ALBUMIN 1.6 (L) 05/19/2019    ALBUMIN 1.7 (L) 05/19/2019     No results found for: PREALBUMIN    Estimated Creatinine Clearance: 62.5 mL/min (based on SCr of 1 mg/dL).    Accu-Checks  Recent Labs     05/18/19  1944 05/19/19  0004 05/19/19  0359 05/19/19  0802 05/19/19  1219 05/19/19  1627 05/19/19  2008 05/20/19  0020 05/20/19  0406 05/20/19  0847   POCTGLUCOSE 294* 237* 190* 208* 210* 244* 227* 196* 279* 258*       Current Medications and/or Treatments Impacting Glycemic Control  Immunotherapy:    Immunosuppressants     None        Steroids:   Hormones (From admission, onward)    None        Pressors:    Autonomic Drugs (From admission, onward)    None        Hyperglycemia/Diabetes Medications:   Antihyperglycemics (From admission, onward)    Start     Stop Route Frequency Ordered    05/20/19 1200  insulin aspart U-100 pen 6 Units      -- SubQ Every 24 hours (non-standard times) 05/19/19 1421 05/20/19 0800  insulin aspart U-100 pen 6 Units      -- SubQ Every 24 hours (non-standard times) 05/19/19 1421 05/20/19 0400  insulin aspart U-100 pen 6 Units      -- SubQ Every 24 hours (non-standard times) 05/19/19 1421 05/20/19 0000  insulin aspart U-100 pen 6 Units      -- SubQ Every 24 hours (non-standard times) 05/19/19 1421 05/19/19 2000  insulin aspart U-100 pen 6 Units      -- SubQ Every 24 hours (non-standard times) 05/19/19 1421    05/19/19 1600  insulin aspart U-100 pen 6 Units      -- SubQ Every 24 hours (non-standard times) 05/19/19 1421     05/10/19 1450  insulin aspart U-100 pen 0-5 Units      -- SubQ Every 4 hours PRN 05/10/19 1351          ASSESSMENT and PLAN    * Ureteral transection of left native kidney  Managed per primary team  Avoid hypoglycemia        Type 2 diabetes mellitus with diabetic peripheral angiopathy without gangrene  BG goal 140 - 180    Increase Novolog 10 units q 4 hrs (persistent correction scale has been needed)  Hold if TFs are stopped.  Increase to Moderate Dose SQ Insulin Correction Scale PRN for BG excursions.   BG monitoring q 4 hrs.     ** Please notify Endocrine for any change and/or advance in diet/TF**  ** Please call Endocrine for any BG related issues **    Discharge Recommendations:     TBD.             CAD (coronary artery disease)  Managed per primary team  Condition may cause insulin resistance       HLD (hyperlipidemia)  Managed per primary team  Condition may cause insulin resistance         Sepsis  Infection may elevate BG readings  Most likely cause for recent BG excursions.   Managed per primary team  Avoid hypoglycemia    Lab Results   Component Value Date    WBC 21.75 (H) 05/20/2019    HGB 6.7 (L) 05/20/2019    HCT 20.7 (L) 05/20/2019    MCV 89 05/20/2019     05/20/2019         Sleep apnea  May affect BG readings if uncontrolled        On enteral nutrition  May cause BG excursions.           Uriah Holder, NP  Endocrinology  [facility]

## 2019-05-20 NOTE — SUBJECTIVE & OBJECTIVE
Interval History: Yesterday, patient became anxious and showed desaturations. She was bag ventilated and vent settings were increased. CXR showed diffuse edema, and patient was given IV Lasix. Urine output excellent. Cr stable at 1.0. H&H stable. WBC increased 18 -> 24.    Review of Systems  Objective:     Temp:  [98.8 °F (37.1 °C)-102.8 °F (39.3 °C)] 98.8 °F (37.1 °C)  Pulse:  [] 92  Resp:  [14-38] 25  SpO2:  [89 %-100 %] 100 %  BP: ()/() 144/63  Arterial Line BP: (102-183)/(40-78) 143/54     Body mass index is 27.88 kg/m².      Bladder Scan Volume (mL): 27 mL (05/10/19 0846)  Post Void Cath Residual Output (mL): 27 mL (05/10/19 0846)    Drains     Drain                 Nephrostomy 04/21/19 0629 Left 8 Fr. 28 days         Gastrostomy/Enterostomy 05/02/19 1134 Percutaneous endoscopic gastrostomy (PEG) LUQ decompression;feeding 17 days         Urethral Catheter 05/16/19 1040 16 Fr. 3 days                Physical Exam   Constitutional: She appears well-developed. No distress.   HENT:   Head: Normocephalic and atraumatic.   Neck: No JVD present.   Cardiovascular: Normal rate and regular rhythm.    Pulmonary/Chest: Effort normal. No respiratory distress.   On vent   Abdominal: Soft. She exhibits no distension. There is no rebound and no guarding.   Incision c/d/i   G-tube   Genitourinary:   Genitourinary Comments: Fontenot in place draining clear yellow urine  Left neph tube with clear yellow urine   Neurological: She is alert.   Skin: Skin is warm and dry. She is not diaphoretic. No pallor.         Significant Labs:    BMP:  Recent Labs   Lab 05/19/19  0336 05/19/19 2030 05/20/19  0358    143 140   K 4.1 4.0 3.7   * 113* 111*   CO2 23 22* 20*   BUN 41* 43* 43*   CREATININE 1.2 1.1 1.0   CALCIUM 8.8 8.6* 8.5*       CBC:   Recent Labs   Lab 05/19/19  0336  05/19/19  1843 05/20/19  0358 05/20/19  0546   WBC 18.52*  --   --  24.57* 23.19*   HGB 6.5*  --  7.9* 7.7* 7.5*   HCT 20.7*   < > 23.3*  23.3* 23.0*     --   --  214 212    < > = values in this interval not displayed.     Significant Imaging:  All pertinent imaging results/findings from the past 24 hours have been reviewed.  CXR - diffuse pulmonary edema

## 2019-05-20 NOTE — PROGRESS NOTES
Pharmacokinetic Assessment Follow Up: IV Vancomycin    Vancomycin Assessment and Plan:    - Patient started on vancomycin on 5/19 for treatment of HCAP.  - Continue current vancomycin regimen of 1500 mg q24 hours.  - Vancomycin trough level is ordered prior to 4th dose on 5/22 at 20:30.  - Goal trough 15-20 mg/dl for pneumonia    Pharmacy will continue to follow and monitor vancomycin.    Thank you for the consult,   Marilou Jones, PharmD, BCCCP  [id number]     Patient brief summary:  Mariann Huff is a 59 y.o. female initiated on antimicrobial therapy with IV vancomycin for treatment of suspected lower respiratory infection.    Drug Allergies:   Review of patient's allergies indicates:  No Known Allergies    Actual Body Weight:   76 kg    Renal Function:   Estimated Creatinine Clearance: 62.5 mL/min (based on SCr of 1 mg/dL).,     Dialysis Method (if applicable):  Not applicable    CBC (last 72 hours):  Recent Labs   Lab Result Units 05/17/19  1134 05/17/19  1620 05/18/19  0256 05/19/19  0336 05/19/19  1843 05/20/19  0358 05/20/19  0546   WBC K/uL 13.63* 14.82* 14.37* 18.52*  --  24.57* 23.19*   Hemoglobin g/dL 7.4* 7.7* 7.5* 6.5* 7.9* 7.7* 7.5*   Hematocrit % 23.0* 24.0* 23.3* 20.7* 23.3* 23.3* 23.0*   Platelets K/uL 190 202 210 246  --  214 212   Gran% % 72.1 72.5 68.3 74.0*  --  81.7* 80.7*   Lymph% % 14.5* 14.1* 16.3* 13.9*  --  9.0* 9.4*   Mono% % 8.8 8.9 10.5 7.3  --  5.2 5.7   Eosinophil% % 3.2 3.2 3.6 3.8  --  2.8 2.9   Basophil% % 0.7 0.5 0.6 0.4  --  0.4 0.4   Differential Method  Automated Automated Automated Automated  --  Automated Automated       Metabolic Panel (last 72 hours):  Recent Labs   Lab Result Units 05/18/19  0256 05/19/19  0336 05/19/19  1154 05/19/19  2030 05/20/19  0358   Sodium mmol/L 148* 144  --  143 140   Potassium mmol/L 4.3 4.1  --  4.0 3.7   Chloride mmol/L 116* 113*  --  113* 111*   CO2 mmol/L 24 23  --  22* 20*   Glucose mg/dL 174* 175*  --  232* 275*   Glucose, UA   --   --   Negative  --   --    BUN, Bld mg/dL 35* 41*  --  43* 43*   Creatinine mg/dL 0.9 1.2  --  1.1 1.0   Albumin g/dL 1.5* 1.7*  --  1.6* 1.6*   Total Bilirubin mg/dL 0.4 0.5  --  0.9 0.8   Alkaline Phosphatase U/L 96 102  --  114 122   AST U/L 19 18  --  25 24   ALT U/L <5* <5*  --  <5* 6*   Magnesium mg/dL 1.7 2.0  --   --  1.5*   Phosphorus mg/dL 3.0 2.7  --   --  2.4*       Vancomycin Administrations:  vancomycin given in the last 96 hours                   vancomycin 1.5 g in 5 % dextrose 250 mL IVPB (mg) 1,500 mg New Bag 05/19/19 2127                Drug levels (last 3 results):  No results for input(s): VANCOMYCINRA, VANCOMYCINPE, VANCOMYCINTR, VANCOTROUGH in the last 72 hours.    Microbiologic Results:  Microbiology Results (last 7 days)     Procedure Component Value Units Date/Time    Culture, Respiratory with Gram Stain [[id number]] Collected:  05/19/19 1130    Order Status:  Completed Specimen:  Respiratory from Endotracheal Aspirate Updated:  05/20/19 0921     Respiratory Culture --     PRESUMPTIVE PSEUDOMONAS SPECIES  Many  Identification and susceptibility pending       Gram Stain (Respiratory) Few WBC's     Gram Stain (Respiratory) Few Gram negative rods    Aerobic culture [[id number]] Collected:  05/19/19 1241    Order Status:  Completed Specimen:  Wound from Abdomen Updated:  05/20/19 0708     Aerobic Bacterial Culture No growth    Blood culture [[id number]] Collected:  05/19/19 1843    Order Status:  Completed Specimen:  Blood from Line, Arterial, Right Updated:  05/20/19 0315     Blood Culture, Routine No Growth to date    Blood culture [[id number]] Collected:  05/19/19 1843    Order Status:  Sent Specimen:  Blood from Line, Arterial, Right Updated:  05/19/19 1844    Gram stain [[id number]] Collected:  05/19/19 1241    Order Status:  Completed Specimen:  Wound from Abdomen Updated:  05/19/19 1622     Gram Stain Result Rare WBC's      No organisms seen    Urine culture [[id number]] Collected:  05/19/19 1154     Order Status:  No result Specimen:  Urine Updated:  05/19/19 1433    Fungus culture [[id number]] Collected:  05/19/19 1241    Order Status:  Sent Specimen:  Wound from Abdomen Updated:  05/19/19 1355    Urine Culture High Risk [[id number]] Collected:  05/13/19 1846    Order Status:  Completed Specimen:  Urine, Catheterized Updated:  05/15/19 1341     Urine Culture, Routine --     CANDIDA ALBICANS  > 100,000 cfu/ml  Treatment of asymptomatic candiduria is not recommended (except for   specific populations). Candida isolated in the urine typically   represents colonization. If an indwelling urinary catheter is present  it should be removed or replaced.  Susceptibility testing not routinely performed      Narrative:       Indicated criteria for high risk culture:->Other  Other (specify):->Post nephrostomy    Blood culture [[id number]] Collected:  05/10/19 1015    Order Status:  Completed Specimen:  Blood from Peripheral, Hand, Right Updated:  05/15/19 1212     Blood Culture, Routine No growth after 5 days.    Blood culture [[id number]] Collected:  05/10/19 1020    Order Status:  Completed Specimen:  Blood from Peripheral, Upper Arm, Right Updated:  05/15/19 1212     Blood Culture, Routine No growth after 5 days.

## 2019-05-20 NOTE — ASSESSMENT & PLAN NOTE
Ms. Huff is a 58 yo F with PMH of HTN, DM II, CAD, NSTEMI, s/p L5-S1 OLIF with NSGY 4/12 c/b intraoperative left ureteral injury and underwent ureteroureteral anastomoses and ureteral stent placement complicated by sepsis due to E.coli septicemia now s/p ex lap on 4/21 and PEG/Trach of the vent now having BRBPR.     S/p flex sig on 5/13  Placed hemablast in rectum on 5/15  Rectum packed with surgicel this am, will f/u bleeding, may need to repeat flex sig if continues to bleed  Transfuse as needed, cont to trend H/H  cont to hold hep gtt  Please call with any questions or concerns

## 2019-05-20 NOTE — ANESTHESIA PREPROCEDURE EVALUATION
[facility]  Anesthesia Pre-Operative Evaluation         Patient Name: Mariann Huff  YOB: 1960  MRN: [id number]    SUBJECTIVE:     Pre-operative evaluation for Procedure(s) (LRB):  SIGMOIDOSCOPY, FLEXIBLE (N/A)     05/20/2019    Mariann Huff is a 59 y.o. female w/ a significant PMHx of DM2, CAD, HTN, DM, who underwent Lumbar Spinal Fusion 4/09/19 with subsequent ureteral injury. Patient presented with AMS on 04/12/19 and taken for E-Lap with Ureteral Ligation and PCN placement.     Hospital and Post oPerative course complicated by Nephrostomy Exchange, Respiratory Failure s/p Trache, Negative Pressure Pulmonary Edema and intermittent rectal bleeding due to rectal ulcers.    Transfused 2U PRBCs overnight.   Now getting transfused 2U PRBCs today    **Procedure scheduled for 05/20/19 but now moved to 05/21/19**  **Patient consented by Dr. ALBERTO Espino on 05/19/19**    Vent Mode: A/C  Oxygen Concentration (%):  [] 61  Resp Rate Total:  [20 br/min-42 br/min] 23 br/min  Vt Set:  [400 mL] 400 mL  PEEP/CPAP:  [5 tmH16-28 cmH20] 10 cmH20  Pressure Support:  [10 cmH20] 10 cmH20  Mean Airway Pressure:  [13 npI86-26 cmH20] 16 cmH20    Patient now presents for the above procedure(s).    LDA:       Peripheral IV - Single Lumen 05/17/19 1630 20 G;1 3/4 in Right Upper Arm (Active)   Site Assessment Clean;Dry;Intact;No redness;No swelling 5/20/2019  7:00 AM   Line Status Infusing 5/20/2019  7:00 AM   Dressing Status Clean;Dry;Intact 5/20/2019  7:00 AM   Dressing Intervention Dressing reinforced 5/20/2019  7:00 AM   Dressing Change Due 05/21/19 5/20/2019  7:00 AM   Site Change Due 05/21/19 5/20/2019  7:00 AM   Reason Not Rotated Not due 5/20/2019  7:00 AM   Number of days: 2            Midline Catheter Insertion/Assessment  - Single Lumen 05/07/19 1632 Left basilic vein (medial side of arm) 18g x 8cm (Active)   Site Assessment  Clean;Dry;Intact;No redness;No swelling 5/20/2019  7:00 AM   IV Device Securement catheter securement device 5/20/2019  7:00 AM   Line Status Saline locked;Flushed 5/20/2019  7:00 AM   Dressing Status Biopatch in place;Clean;Dry;Intact 5/20/2019  7:00 AM   Dressing Intervention Dressing reinforced 5/20/2019  7:00 AM   Dressing Change Due 05/21/19 5/20/2019  7:00 AM   Site Change Due 06/06/19 5/20/2019  7:00 AM   Reason Not Rotated Not due 5/20/2019  7:00 AM   Number of days: 12            Arterial Line 05/19/19 1830 Right Radial (Active)   Site Assessment Clean;Dry;Intact;No redness;No swelling 5/20/2019  7:00 AM   Line Status Pulsatile blood flow 5/20/2019  7:00 AM   Art Line Waveform Appropriate;Square wave test performed 5/20/2019  7:00 AM   Arterial Line Interventions Zeroed and calibrated;Leveled;Connections checked and tightened;Flushed per protocol;Line pulled back 5/20/2019  7:00 AM   Color/Movement/Sensation Capillary refill less than 3 sec 5/20/2019  7:00 AM   Dressing Type Transparent 5/20/2019  7:00 AM   Dressing Status Clean;Dry;Intact 5/20/2019  7:00 AM   Dressing Intervention Dressing reinforced 5/20/2019  7:00 AM   Dressing Change Due 05/23/19 5/20/2019  7:00 AM   Number of days: 0            Nephrostomy 04/21/19 0629 Left 8 Fr. (Active)   Urine Characteristics concentrated;sediment;yellow 5/20/2019  7:00 AM   Clamp Status unclamped 5/20/2019  7:00 AM   Characteristics dry;no redness;no warmth;no drainage;no tenderness;no swelling 5/20/2019  7:00 AM   Drainage yellow drainage 5/20/2019  7:00 AM   Dressing transparent dressing 5/20/2019  7:00 AM   Dressing Type Other (Comment) 5/20/2019  7:00 AM   Site Care cleansed w/ chlorahexadine 5/19/2019  3:00 PM   Dressing Change Due 05/26/19 5/20/2019  7:00 AM   Collection Container Standard drainage bag 5/20/2019  7:00 AM   Securement Method secured to lower ABD w/ adhesive device 5/20/2019  7:00 AM   Output (mL) 75 mL 5/20/2019 10:30 AM   Number of days: 29  "           Gastrostomy/Enterostomy 05/02/19 1134 Percutaneous endoscopic gastrostomy (PEG) LUQ decompression;feeding (Active)   Securement secured to abdomen 5/20/2019  7:00 AM   Interventions Prior to Feeding patency checked;residual checked 5/20/2019  7:00 AM   Suction Setting/Drainage Method continuous setting 5/14/2019  3:02 PM   Drainage serous 5/5/2019  3:00 AM   Feeding Type continuous 5/20/2019  7:00 AM   Clamp Status/Tolerance no abdominal discomfort;no abdominal distention;no emesis;no nausea;no residual;no restlessness 5/20/2019  7:00 AM   Feeding Action feeding continued 5/20/2019  7:00 AM   Dressing dry and intact 5/20/2019  7:00 AM   Insertion Site dry 5/20/2019  7:00 AM   Site Care sterile precut T-slit dressing applied 5/20/2019  7:00 AM   Flush/Irrigation flushed w/;water;no resistance met 5/20/2019  7:00 AM   Dressing Change Due 05/16/19 5/16/2019  3:01 PM   Current Rate (mL/hr) 40 mL/hr 5/20/2019  7:00 AM   Goal Rate (mL/hr) 40 mL/hr 5/20/2019  7:00 AM   Intake (mL) 90 mL 5/20/2019 10:30 AM   Water Bolus (mL) 250 mL 5/20/2019  8:30 AM   Tube Output(mL)(Include Discarded Residual) 150 mL 5/3/2019  3:15 PM   Formula Name Peptamen Intense 5/3/2019  3:15 PM   Tube Feeding Intake (mL) 40 5/20/2019  3:00 AM   Residual Amount (ml) 40 ml 5/19/2019  7:00 PM   Number of days: 18            Urethral Catheter 05/16/19 1040 16 Fr. (Active)   Site Assessment Clean;Intact 5/20/2019  7:00 AM   Collection Container Urimeter 5/20/2019  7:00 AM   Securement Method secured to top of thigh w/ adhesive device 5/20/2019  7:00 AM   Catheter Care Performed yes 5/20/2019  7:00 AM   Reason for Continuing Urinary Catheterization Critically ill in ICU requiring intensive monitoring 5/20/2019  7:00 AM   CAUTI Prevention Bundle StatLock in place w 1" slack;Green sheeting clip in use;Intact seal between catheter & drainage tubing;Drainage bag off the floor;No dependent loops or kinks;Drainage bag below bladder;Drainage bag not " overfilled (<2/3 full) 5/20/2019  7:00 AM   Output (mL) 15 mL 5/20/2019  1:00 PM   Number of days: 4       Prev airway:   8.0 Shiley Cuffed Trach  Vent Mode: A/C  Oxygen Concentration (%):  [] 61  Resp Rate Total:  [20 br/min-42 br/min] 23 br/min  Vt Set:  [400 mL] 400 mL  PEEP/CPAP:  [5 gjY33-21 cmH20] 10 cmH20  Pressure Support:  [10 cmH20] 10 cmH20  Mean Airway Pressure:  [13 ihL55-42 cmH20] 16 cmH20      Drips:    dexmedetomidine (PRECEDEX) infusion Stopped (05/20/19 0651)       Patient Active Problem List   Diagnosis    Hypertension associated with diabetes    Hyperlipidemia    CAD (coronary artery disease)    Sleep apnea    Carotid stenosis, bilateral    Non compliance with medical treatment    PAPA (acute kidney injury)    Coronary stent restenosis    S/P coronary artery stent placement    Nuclear sclerosis - Right Eye    Primary localized osteoarthrosis, lower leg    Chronic sinusitis    PSC (posterior subcapsular cataract), right    DME (diabetic macular edema)    Refractive error    Pseudophakia    Senile nuclear sclerosis    Type 2 diabetes mellitus with diabetic peripheral angiopathy without gangrene    Diabetic peripheral neuropathy associated with type 2 diabetes mellitus    Cervical arthritis    Neurogenic claudication    Lumbar herniated disc    Fatty liver disease, nonalcoholic    Arthritis of lumbar spine    Chronic pain    Lumbar radiculopathy    Lumbosacral radiculopathy at L5    Ureteral transection of left native kidney    Ureteral stent retained    Sepsis    Encephalopathy    Type 2 diabetes mellitus, with long-term current use of insulin    HLD (hyperlipidemia)    Asthma    Essential hypertension    Altered mental status    Encounter for weaning from ventilator    Metabolic acidosis    At risk for fluid volume overload    On enteral nutrition    Postoperative infection    Acute postoperative respiratory failure    Pulmonary edema    Anemia     "Dermatitis associated with moisture    BRBPR (bright red blood per rectum)    Bradycardia    Delayed surgical wound healing    Urinary tract infection without hematuria       Review of patient's allergies indicates:  No Known Allergies    Current Inpatient Medications:   ceFEPime (MAXIPIME) IVPB  2 g Intravenous Q8H    chlorhexidine  15 mL Mouth/Throat BID    fluconazole 40 mg/ml  200 mg Per G Tube Daily    [START ON 5/21/2019] insulin aspart U-100  8 Units Subcutaneous Q24H    [START ON 5/21/2019] insulin aspart U-100  8 Units Subcutaneous Q24H    [START ON 5/21/2019] insulin aspart U-100  8 Units Subcutaneous Q24H    insulin aspart U-100  8 Units Subcutaneous Q24H    insulin aspart U-100  8 Units Subcutaneous Q24H    insulin aspart U-100  8 Units Subcutaneous Q24H    loperamide  2 mg Per G Tube QID    miconazole nitrate 2%   Topical (Top) BID    pantoprazole  40 mg Per G Tube Daily    rifAMpin (RIFADIN) IVPB  300 mg Intravenous Q12H    silodosin  4 mg Per G Tube Daily    vancomycin (VANCOCIN) IVPB  1,500 mg Intravenous Q24H       Current Facility-Administered Medications on File Prior to Encounter   Medication Dose Route Frequency Provider Last Rate Last Dose    lidocaine (PF) 10 mg/ml (1%) injection 10 mg  1 mL Intradermal Once Dave Bentley Jr., MD         Current Outpatient Medications on File Prior to Encounter   Medication Sig Dispense Refill    albuterol (PROVENTIL/VENTOLIN HFA) 90 mcg/actuation inhaler Inhale 2 puffs into the lungs every 6 (six) hours as needed for Wheezing. 1 each 11    amLODIPine (NORVASC) 10 MG tablet TAKE 1 TABLET (10 MG TOTAL) BY MOUTH ONCE DAILY. 30 tablet 6    aspirin 81 mg Tab Take 81 mg by mouth. 1 Tablet Oral Every day      atorvastatin (LIPITOR) 40 MG tablet TAKE 1 TABLET (40 MG TOTAL) BY MOUTH ONCE DAILY. 30 tablet 11    ACCU-CHEK EDIN PLUS METER Misc       BD INSULIN PEN NEEDLE UF MINI 31 x 3/16 " Ndle USE 4 TIMES A  each 11    blood sugar " "diagnostic (ACCU-CHEK EDIN PLUS TEST STRP) Strp TEST BLOOD SUGARS 4 TIMES DAILY 150 strip 11    chlorthalidone (HYGROTEN) 50 MG Tab Take 1 tablet (50 mg total) by mouth once daily. 90 tablet 3    esomeprazole (NEXIUM) 40 MG capsule TAKE 1 CAPSULE (40 MG TOTAL) BY MOUTH BEFORE BREAKFAST. 90 capsule 3    fenofibrate micronized (LOFIBRA) 134 MG Cap TAKE 1 CAPSULE (134 MG TOTAL) BY MOUTH BEFORE BREAKFAST. 90 capsule 3    flash glucose scanning reader (FREESTYLE MISTI 14 DAY READER) Misc 1 each by Misc.(Non-Drug; Combo Route) route once daily. 2 each 11    flash glucose sensor (FREESTYLE MISTI 14 DAY SENSOR) Kit 1 each by Misc.(Non-Drug; Combo Route) route once daily. 2 kit 11    gabapentin (NEURONTIN) 100 MG capsule Take 2 capsules (200 mg total) by mouth every evening. 60 capsule 11    insulin aspart U-100 (NOVOLOG FLEXPEN U-100 INSULIN) 100 unit/mL InPn pen INJECT 35 U AM, 45 U AT NOON, 35 U PM.150-200 +2, 201-250 +4, 251-300 +6, 301-350 +8, >350 +10 30 Syringe 1    insulin glargine, TOUJEO, (TOUJEO SOLOSTAR U-300 INSULIN) 300 unit/mL (1.5 mL) InPn pen Inject 50 Units into the skin 2 (two) times daily. 6 Syringe 6    INVOKANA 300 mg Tab tablet Take 1 tablet (300 mg total) by mouth once daily. 30 tablet 6    irbesartan (AVAPRO) 300 MG tablet Take 1 tablet (300 mg total) by mouth every evening. 90 tablet 3    lancets 30 gauge Misc       metoprolol succinate (TOPROL-XL) 100 MG 24 hr tablet TAKE 1 TABLET (100 MG TOTAL) BY MOUTH ONCE DAILY. 30 tablet 11    nitroGLYCERIN (NITROSTAT) 0.4 MG SL tablet Take one every 2-3 min till chestpain relief for 3 times and if still no relief, call MD or come to ED 30 tablet 11    omega-3 acid ethyl esters (LOVAZA) 1 gram capsule Take 1 g by mouth once daily.       pen needle, diabetic (BD ULTRA-FINE GRABIEL PEN NEEDLES) 32 gauge x 5/32" Ndle For use with insulin pens daily 4 times a day 100 each 11    prasugrel (EFFIENT) 10 mg Tab TAKE 1 TABLET (10 MG TOTAL) BY MOUTH ONCE " DAILY. 30 tablet 10    tamsulosin (FLOMAX) 0.4 mg Cap Take 1 capsule (0.4 mg total) by mouth every evening. 30 capsule 2    tramadol (ULTRAM) 50 mg tablet Take 1 tablet (50 mg total) by mouth 3 (three) times daily as needed for Pain. 60 tablet 1    TRULICITY 1.5 mg/0.5 mL PnIj INJECT 1.5 MG INTO THE SKIN EVERY 7 DAYS. 2 mL 6    zolpidem (AMBIEN) 5 MG Tab Take 1 tablet (5 mg total) by mouth nightly as needed. 30 tablet 2       Past Surgical History:   Procedure Laterality Date    BRONCHOSCOPY N/A 2019    Performed by Sean [facility]ano [facility] at [facility] OR 2ND FLR    CARDIAC CATHETERIZATION      cataract extraction left eye      cataracts      CATHETERIZATION, HEART, LEFT Left 2014    Performed by Wilman Kim MD at [facility] CATH LAB     SECTION, LOW TRANSVERSE      CORONARY ANGIOPLASTY      Creation, Nephrostomy, Percutaneous Left 2019    Performed by Karina Surgeon at [facility] KARINA    CREATION, TRACHEOSTOMY N/A 2019    Performed by Sean Ruano MD at [facility] OR 2ND FLR    EGD, WITH PEG TUBE INSERTION  2019    Performed by Sean Ruano MD at [facility] OR 2ND FLR    ESOPHAGOGASTRODUODENOSCOPY (EGD) N/A 2016    Performed by Gardenia Adamson MD at [facility] ENDO (4TH FLR)    EXCISION TURBINATE, SUBMUCOUS      FUSION, SPINE, LUMBAR, ANTERIOR APPROACH Left L5-S1 Anterior to Psoa Interbody Fusion, L5-S1 Posterior Instrumentation Left 2019    Performed by Mk George MD at [facility] OR 2ND FLR    HAND SURGERY Left     HAND SURGERY Right     torn ligament repair/ Dr. Yeboah    HYSTERECTOMY      Injection,steroid,epidural,transforaminal approach - Bilateral - S1 Bilateral 2018    Performed by Tl Abreu MD at [facility] PAIN MGT    Injection-steroid-epidural-caudal N/A 2019    Performed by Dave Bentley Jr., MD at [facility] PAIN MGT    INSERTION-INTRAOCULAR LENS (IOL) Right 2015    Performed by Good Domingo MD at [facility] OR 1ST FLR    LAPAROTOMY,  EXPLORATORY; LIGATION OF LEFT URETER; DOUBLE J STENT REMOVAL LEFT URETER Left 4/20/2019    Performed by Paul Carlson MD at [facility] OR 2ND FLR    left foot surgery      left wrist surgery      NASAL SEPTUM SURGERY  5/7/15    PHACOEMULSIFICATION-ASPIRATION-CATARACT Right 9/1/2015    Performed by Good Domingo MD at [facility] OR 1ST FLR    REPAIR, URETER  4/12/2019    Performed by Mk George MD at [facility] OR 2ND FLR    RESECTION-TURBINATES (SMR) N/A 5/7/2015    Performed by Dileep Dubois III, MD at [facility] OR 2ND FLR    rt elbow surgery      S/P LAD COATED STENT  05/14/2010    6 total     S/P OM1 STENT  08/2003    SEPTOPLASTY N/A 5/7/2015    Performed by Dilepe Dubois III, MD at [facility] OR 2ND FLR    SIGMOIDOSCOPY, FLEXIBLE N/A 5/13/2019    Performed by ALBERTO Amin MD at [facility] ENDO (2ND FLR)    SINUS SURGERY      F.E.S.S.    SINUS SURGERY FUNCTIONAL ENDOSCOPIC WITH NAVIGATION WITH MAXILLARIES, ETHMOIDS, LEFT SPHENOID, LEFT LOLY N/A 5/7/2015    Performed by Dileep Dubois III, MD at [facility] OR 2ND FLR    STENT, URETERAL placement  4/12/2019    Performed by Robin Boyd MD at [facility] OR 2ND FLR    TUBAL LIGATION         Social History     Socioeconomic History    Marital status:      Spouse name: Shamir    Number of children: 2    Years of education: Not on file    Highest education level: Not on file   Occupational History    Occupation: cafeteria     Employer: [employer]     Employer: [employer]     Employer: [employer]   Social Needs    Financial resource strain: Not on file    Food insecurity:     Worry: Not on file     Inability: Not on file    Transportation needs:     Medical: Not on file     Non-medical: Not on file   Tobacco Use    Smoking status: Never Smoker    Smokeless tobacco: Never Used   Substance and Sexual Activity    Alcohol use: No     Alcohol/week: 0.0 oz    Drug use: No    Sexual activity: Yes     Partners: Male      Birth control/protection: Post-menopausal     Comment:    Lifestyle    Physical activity:     Days per week: Not on file     Minutes per session: Not on file    Stress: Not on file   Relationships    Social connections:     Talks on phone: Not on file     Gets together: Not on file     Attends [employer] service: Not on file     Active member of club or organization: Not on file     Attends meetings of clubs or organizations: Not on file     Relationship status: Not on file   Other Topics Concern    Are you pregnant or think you may be? No    Breast-feeding No   Social History Narrative    . 2 children.        OBJECTIVE:     Vital Signs Range (Last 24H):  Temp:  [37.1 °C (98.8 °F)-39.3 °C (102.8 °F)]   Pulse:  []   Resp:  [14-35]   BP: ()/()   SpO2:  [89 %-100 %]   Arterial Line BP: ()/()       Significant Labs:  Lab Results   Component Value Date    WBC 21.75 (H) 05/20/2019    HGB 6.7 (L) 05/20/2019    HCT 20.7 (L) 05/20/2019     05/20/2019    CHOL 202 (H) 01/21/2019    TRIG 100 01/21/2019    HDL 48 01/21/2019    ALT 6 (L) 05/20/2019    AST 24 05/20/2019     05/20/2019    K 5.1 05/20/2019     (H) 05/20/2019    CREATININE 1.0 05/20/2019    BUN 45 (H) 05/20/2019    CO2 22 (L) 05/20/2019    TSH 1.170 10/22/2018    INR 1.0 05/20/2019    GLUF 217 (H) 09/15/2014    HGBA1C 10.8 (H) 02/19/2019       Diagnostic Studies: No relevant studies.    EKG: Vent. Rate : 092 BPM     Atrial Rate : 092 BPM     P-R Int : 152 ms          QRS Dur : 072 ms      QT Int : 364 ms       P-R-T Axes : 051 -11 134 degrees     QTc Int : 450 ms    Normal sinus rhythm  Nonspecific T wave abnormality  Abnormal ECG  When compared with ECG of 08-May-2019 06:20:29  Rate slower  T waves inverted laterally  ST segments no longer depressed    2D ECHO:  Results for orders placed or performed in visit on 02/26/14   2D echo with color flow doppler   Result Value Ref Range    QEF 65      Diastolic Dysfunction Yes    · Low normal left ventricular systolic function. The estimated ejection fraction is 53%  · Normal LV diastolic function.  · Local segmental wall motion abnormalities.  · Normal right ventricular systolic function.  · Mild to mild-moderate (1-2+) mitral regurgitation.  Mechanically ventilated; cannot use inferior caval vein diameter to estimate central venous pressure.      ASSESSMENT/PLAN:       Anesthesia Evaluation    I have reviewed the Patient Summary Reports.    I have reviewed the Nursing Notes.   I have reviewed the Medications.     Review of Systems  Anesthesia Hx:  Denies Family Hx of Anesthesia complications.   Denies Personal Hx of Anesthesia complications.       Physical Exam  General:  Well nourished, Morbid Obesity    Airway/Jaw/Neck:  Airway Findings: Tongue: Large Pre-Existing Airway Tube(s): Tracheostomy tube  Size: 8.0  General Airway Assessment: Adult            Mental Status:  Mental Status Findings:  Anxious, Cooperative         Anesthesia Plan  Type of Anesthesia, risks & benefits discussed:  Anesthesia Type:  general  Patient's Preference:   Intra-op Monitoring Plan: arterial line and standard ASA monitors  Intra-op Monitoring Plan Comments:   Post Op Pain Control Plan: multimodal analgesia, IV/PO Opioids PRN and per primary service following discharge from PACU  Post Op Pain Control Plan Comments:   Induction:   IV  Beta Blocker:  Patient is on a Beta-Blocker and has received one dose within the past 24 hours (No further documentation required).       Informed Consent: Patient representative understands risks and agrees with Anesthesia plan.  Questions answered. Anesthesia consent signed with patient representative.  ASA Score: 4     Day of Surgery Review of History & Physical:            Ready For Surgery From Anesthesia Perspective.

## 2019-05-20 NOTE — ASSESSMENT & PLAN NOTE
60 y/o female with HTN, DMII, CAD, NSTEMI, s/p L5-S1 OLIF with NSGY 4/12 c/b intraoperative left ureteral injury and underwent ureteroureteral anastomoses and ureteral stent placement. She was discharged with a correa and MADHURI drain that was removed on 4/16. She was seen by urology and anticipated stent removal in 2-3 months (6/2019).  She presents to ED with AMS and E.coli septicemia found to have air and fluid collection of left anterior prevertebral soft tissue with left ureter involvement. She underwent ex-lap and washout on 4/21. We are consulted for abx recommendations.      Per op note,  There were pocket of purulence noted and evacuated, sent for culture of left retroperitoneum to pole of left kidney. The stent was visible. Posterior to the stent there was a pocket of purulent fluid and exposed hardware along the spinal vertebrae. The tissue along the distal and proximal end of ureter were friable and unhealthy. She underwent left nephrostomy tube placement. The stent was removed and several metal clips were placed on proximal end of the ureteral.      OR cultures with Staph Lugdenensis - not currently covered.  Patient does appear to be having diarrhea - C diff negative but suspect may need restesting if WBC increases and no other source found.  QTc long at 480      Repeat BCXs sent and NGTD.  BAL done but no WBC no organisms seen - NGTD.  Source of fever and leukocytosis unclear but ABD suspected though could be non infectious - drug reaction vs PE/DVT.  CT abdomen with superficial fluid collection - possible seroma and inflammatory change around ureter.  C diff repeated and negative.  Line changes jonathan cvc - removed.  RUE US shows partially occlusive thrombus in RIJ and proximal subclvian vein - suspected cause of fevers.  CXR clear last time seen by ID.    Again - She has developed fever and leukocytosis but appears not septic.  The patient denies any recent fever, chills, sweats, diarrhea, abdominal pain,  pain, or dysuria.  She is still bleeding from rectum per nurse. CXR with new L basilar consolidation w cf HCAP.  ABD drain site with pus - cultured - ? Superficial..       Stable non septic    There could be multiple causes to fever and leukocytosis in this patient:  1. ABD - Prior CT 5/1  There are postsurgical and inflammatory changes in the ventral midline abdominal wall with a focal fluid collection in the midline measuring 2.2 x 4.7 x 6.6 cm (AP x TV x CC).  There is mild body wall edema  ? Resolved  2. GI bleeding  3. PNA - endotracheal aspirated with GNR   Blood cultures NGTD - resnt today    Plan  -Conttinue vanc/cefepime cf HCAP  -Continue Rifampin.  - drain site pus sent for culture - ? superificial - ngtd  - scope in AM with GI  - Recommend working up other causes of white count as outline above. - Recommend CT abdomen/pelvis as had SQ fluid on last CT  - if having diarrhea - send C Diff  - if clinically deteriorates would do Vanc and Meropenem given long hospital exposure and cf MDROs  - made primary team aware  -ID Will follow

## 2019-05-20 NOTE — PLAN OF CARE
05/20/19 1058   Post-Acute Status   Post-Acute Authorization Placement   Post-Acute Placement Status Awaiting Internal Medical Clearance     NICK spoke with Latisha river CM.  A referral was sent to [facility] over the weekend.  NICK spoke with the resident Dr. Neumann.  Pt is not stable to transfer at this time.  NICK will f/u as needed.  NICK updated Billie with [facility].    Ирина Godinez, [facility]  [phone]

## 2019-05-21 ENCOUNTER — ANESTHESIA (OUTPATIENT)
Dept: ENDOSCOPY | Facility: HOSPITAL | Age: 59
DRG: 003 | End: 2019-05-21
Payer: MEDICARE

## 2019-05-21 LAB
ALBUMIN SERPL BCP-MCNC: 1.4 G/DL (ref 3.5–5.2)
ALP SERPL-CCNC: 102 U/L (ref 55–135)
ALT SERPL W/O P-5'-P-CCNC: <5 U/L (ref 10–44)
ANION GAP SERPL CALC-SCNC: 9 MMOL/L (ref 8–16)
AST SERPL-CCNC: 22 U/L (ref 10–40)
BACTERIA SPEC AEROBE CULT: NORMAL
BASOPHILS # BLD AUTO: 0.09 K/UL (ref 0–0.2)
BASOPHILS NFR BLD: 0.4 % (ref 0–1.9)
BILIRUB SERPL-MCNC: 0.6 MG/DL (ref 0.1–1)
BUN SERPL-MCNC: 39 MG/DL (ref 6–20)
CALCIUM SERPL-MCNC: 8.7 MG/DL (ref 8.7–10.5)
CHLORIDE SERPL-SCNC: 111 MMOL/L (ref 95–110)
CO2 SERPL-SCNC: 20 MMOL/L (ref 23–29)
CREAT SERPL-MCNC: 0.8 MG/DL (ref 0.5–1.4)
DIFFERENTIAL METHOD: ABNORMAL
EOSINOPHIL # BLD AUTO: 1.3 K/UL (ref 0–0.5)
EOSINOPHIL NFR BLD: 6.5 % (ref 0–8)
ERYTHROCYTE [DISTWIDTH] IN BLOOD BY AUTOMATED COUNT: 14.9 % (ref 11.5–14.5)
EST. GFR  (AFRICAN AMERICAN): >60 ML/MIN/1.73 M^2
EST. GFR  (NON AFRICAN AMERICAN): >60 ML/MIN/1.73 M^2
GLUCOSE SERPL-MCNC: 197 MG/DL (ref 70–110)
GRAM STN SPEC: NORMAL
GRAM STN SPEC: NORMAL
HCT VFR BLD AUTO: 23.9 % (ref 37–48.5)
HGB BLD-MCNC: 7.6 G/DL (ref 12–16)
IMM GRANULOCYTES # BLD AUTO: 0.16 K/UL (ref 0–0.04)
IMM GRANULOCYTES NFR BLD AUTO: 0.8 % (ref 0–0.5)
INR PPP: 1 (ref 0.8–1.2)
LYMPHOCYTES # BLD AUTO: 2.2 K/UL (ref 1–4.8)
LYMPHOCYTES NFR BLD: 10.7 % (ref 18–48)
MAGNESIUM SERPL-MCNC: 2 MG/DL (ref 1.6–2.6)
MCH RBC QN AUTO: 28.8 PG (ref 27–31)
MCHC RBC AUTO-ENTMCNC: 31.8 G/DL (ref 32–36)
MCV RBC AUTO: 91 FL (ref 82–98)
MONOCYTES # BLD AUTO: 1.5 K/UL (ref 0.3–1)
MONOCYTES NFR BLD: 7.5 % (ref 4–15)
NEUTROPHILS # BLD AUTO: 15 K/UL (ref 1.8–7.7)
NEUTROPHILS NFR BLD: 74.1 % (ref 38–73)
NRBC BLD-RTO: 0 /100 WBC
PHOSPHATE SERPL-MCNC: 3.1 MG/DL (ref 2.7–4.5)
PLATELET # BLD AUTO: 226 K/UL (ref 150–350)
PMV BLD AUTO: 11.5 FL (ref 9.2–12.9)
POCT GLUCOSE: 100 MG/DL (ref 70–110)
POCT GLUCOSE: 107 MG/DL (ref 70–110)
POCT GLUCOSE: 112 MG/DL (ref 70–110)
POCT GLUCOSE: 112 MG/DL (ref 70–110)
POCT GLUCOSE: 174 MG/DL (ref 70–110)
POCT GLUCOSE: 228 MG/DL (ref 70–110)
POTASSIUM SERPL-SCNC: 4.3 MMOL/L (ref 3.5–5.1)
PROT SERPL-MCNC: 5.5 G/DL (ref 6–8.4)
PROTHROMBIN TIME: 10.7 SEC (ref 9–12.5)
RBC # BLD AUTO: 2.64 M/UL (ref 4–5.4)
SODIUM SERPL-SCNC: 140 MMOL/L (ref 136–145)
WBC # BLD AUTO: 20.27 K/UL (ref 3.9–12.7)

## 2019-05-21 PROCEDURE — 63600175 PHARM REV CODE 636 W HCPCS: Performed by: NURSE PRACTITIONER

## 2019-05-21 PROCEDURE — 25000003 PHARM REV CODE 250: Performed by: STUDENT IN AN ORGANIZED HEALTH CARE EDUCATION/TRAINING PROGRAM

## 2019-05-21 PROCEDURE — 37000009 HC ANESTHESIA EA ADD 15 MINS: Performed by: COLON & RECTAL SURGERY

## 2019-05-21 PROCEDURE — 20000000 HC ICU ROOM

## 2019-05-21 PROCEDURE — 27000221 HC OXYGEN, UP TO 24 HOURS

## 2019-05-21 PROCEDURE — 99232 PR SUBSEQUENT HOSPITAL CARE,LEVL II: ICD-10-PCS | Mod: ,,, | Performed by: NURSE PRACTITIONER

## 2019-05-21 PROCEDURE — 99233 PR SUBSEQUENT HOSPITAL CARE,LEVL III: ICD-10-PCS | Mod: ,,, | Performed by: SURGERY

## 2019-05-21 PROCEDURE — 99232 SBSQ HOSP IP/OBS MODERATE 35: CPT | Mod: ,,, | Performed by: NURSE PRACTITIONER

## 2019-05-21 PROCEDURE — 94003 VENT MGMT INPAT SUBQ DAY: CPT

## 2019-05-21 PROCEDURE — 99232 SBSQ HOSP IP/OBS MODERATE 35: CPT | Mod: ,,, | Performed by: PHYSICIAN ASSISTANT

## 2019-05-21 PROCEDURE — 85610 PROTHROMBIN TIME: CPT

## 2019-05-21 PROCEDURE — 25000003 PHARM REV CODE 250: Performed by: NURSE ANESTHETIST, CERTIFIED REGISTERED

## 2019-05-21 PROCEDURE — 85025 COMPLETE CBC W/AUTO DIFF WBC: CPT

## 2019-05-21 PROCEDURE — 99900026 HC AIRWAY MAINTENANCE (STAT)

## 2019-05-21 PROCEDURE — D9220A PRA ANESTHESIA: ICD-10-PCS | Mod: CRNA,,, | Performed by: NURSE ANESTHETIST, CERTIFIED REGISTERED

## 2019-05-21 PROCEDURE — 63600175 PHARM REV CODE 636 W HCPCS: Performed by: STUDENT IN AN ORGANIZED HEALTH CARE EDUCATION/TRAINING PROGRAM

## 2019-05-21 PROCEDURE — 83735 ASSAY OF MAGNESIUM: CPT

## 2019-05-21 PROCEDURE — 99232 PR SUBSEQUENT HOSPITAL CARE,LEVL II: ICD-10-PCS | Mod: ,,, | Performed by: PHYSICIAN ASSISTANT

## 2019-05-21 PROCEDURE — 99233 SBSQ HOSP IP/OBS HIGH 50: CPT | Mod: ,,, | Performed by: SURGERY

## 2019-05-21 PROCEDURE — D9220A PRA ANESTHESIA: ICD-10-PCS | Mod: ANES,,, | Performed by: ANESTHESIOLOGY

## 2019-05-21 PROCEDURE — 94761 N-INVAS EAR/PLS OXIMETRY MLT: CPT

## 2019-05-21 PROCEDURE — D9220A PRA ANESTHESIA: Mod: ANES,,, | Performed by: ANESTHESIOLOGY

## 2019-05-21 PROCEDURE — 80053 COMPREHEN METABOLIC PANEL: CPT

## 2019-05-21 PROCEDURE — D9220A PRA ANESTHESIA: Mod: CRNA,,, | Performed by: NURSE ANESTHETIST, CERTIFIED REGISTERED

## 2019-05-21 PROCEDURE — 37000008 HC ANESTHESIA 1ST 15 MINUTES: Performed by: COLON & RECTAL SURGERY

## 2019-05-21 PROCEDURE — 84100 ASSAY OF PHOSPHORUS: CPT

## 2019-05-21 PROCEDURE — 63600175 PHARM REV CODE 636 W HCPCS: Performed by: NURSE ANESTHETIST, CERTIFIED REGISTERED

## 2019-05-21 PROCEDURE — 25000003 PHARM REV CODE 250

## 2019-05-21 PROCEDURE — 99900035 HC TECH TIME PER 15 MIN (STAT)

## 2019-05-21 RX ORDER — INSULIN ASPART 100 [IU]/ML
7 INJECTION, SOLUTION INTRAVENOUS; SUBCUTANEOUS
Status: DISCONTINUED | OUTPATIENT
Start: 2019-05-22 | End: 2019-05-21

## 2019-05-21 RX ORDER — INSULIN ASPART 100 [IU]/ML
7 INJECTION, SOLUTION INTRAVENOUS; SUBCUTANEOUS
Status: DISCONTINUED | OUTPATIENT
Start: 2019-05-21 | End: 2019-05-21

## 2019-05-21 RX ORDER — INSULIN ASPART 100 [IU]/ML
5 INJECTION, SOLUTION INTRAVENOUS; SUBCUTANEOUS
Status: DISCONTINUED | OUTPATIENT
Start: 2019-05-22 | End: 2019-05-22

## 2019-05-21 RX ORDER — MIDAZOLAM HYDROCHLORIDE 1 MG/ML
INJECTION, SOLUTION INTRAMUSCULAR; INTRAVENOUS
Status: DISCONTINUED | OUTPATIENT
Start: 2019-05-21 | End: 2019-05-21

## 2019-05-21 RX ORDER — PHENYLEPHRINE HCL IN 0.9% NACL 1 MG/10 ML
SYRINGE (ML) INTRAVENOUS
Status: COMPLETED
Start: 2019-05-21 | End: 2019-05-21

## 2019-05-21 RX ORDER — PROPOFOL 10 MG/ML
VIAL (ML) INTRAVENOUS
Status: DISCONTINUED | OUTPATIENT
Start: 2019-05-21 | End: 2019-05-21

## 2019-05-21 RX ORDER — KETAMINE HCL IN 0.9 % NACL 50 MG/5 ML
SYRINGE (ML) INTRAVENOUS
Status: DISCONTINUED | OUTPATIENT
Start: 2019-05-21 | End: 2019-05-21

## 2019-05-21 RX ORDER — INSULIN ASPART 100 [IU]/ML
5 INJECTION, SOLUTION INTRAVENOUS; SUBCUTANEOUS
Status: DISCONTINUED | OUTPATIENT
Start: 2019-05-21 | End: 2019-05-22

## 2019-05-21 RX ORDER — PHENYLEPHRINE HYDROCHLORIDE 10 MG/ML
INJECTION INTRAVENOUS
Status: DISCONTINUED | OUTPATIENT
Start: 2019-05-21 | End: 2019-05-21

## 2019-05-21 RX ADMIN — RIFAMPIN 300 MG: 600 INJECTION, POWDER, LYOPHILIZED, FOR SOLUTION INTRAVENOUS at 03:05

## 2019-05-21 RX ADMIN — MICONAZOLE NITRATE: 20 OINTMENT TOPICAL at 08:05

## 2019-05-21 RX ADMIN — FLUCONAZOLE 200 MG: 40 POWDER, FOR SUSPENSION ORAL at 09:05

## 2019-05-21 RX ADMIN — Medication 50 MG: at 07:05

## 2019-05-21 RX ADMIN — PROPOFOL 50 MG: 10 INJECTION, EMULSION INTRAVENOUS at 07:05

## 2019-05-21 RX ADMIN — PHENYLEPHRINE HYDROCHLORIDE 200 MCG: 10 INJECTION INTRAVENOUS at 07:05

## 2019-05-21 RX ADMIN — CEFEPIME 2 G: 2 INJECTION, POWDER, FOR SOLUTION INTRAVENOUS at 11:05

## 2019-05-21 RX ADMIN — MIDAZOLAM HYDROCHLORIDE 2 MG: 1 INJECTION, SOLUTION INTRAMUSCULAR; INTRAVENOUS at 07:05

## 2019-05-21 RX ADMIN — Medication 2 MG: at 09:05

## 2019-05-21 RX ADMIN — INSULIN ASPART 6 UNITS: 100 INJECTION, SOLUTION INTRAVENOUS; SUBCUTANEOUS at 03:05

## 2019-05-21 RX ADMIN — CHLORHEXIDINE GLUCONATE 0.12% ORAL RINSE 15 ML: 1.2 LIQUID ORAL at 08:05

## 2019-05-21 RX ADMIN — CEFEPIME 2 G: 2 INJECTION, POWDER, FOR SOLUTION INTRAVENOUS at 07:05

## 2019-05-21 RX ADMIN — INSULIN ASPART 7 UNITS: 100 INJECTION, SOLUTION INTRAVENOUS; SUBCUTANEOUS at 04:05

## 2019-05-21 RX ADMIN — Medication 2 MG: at 01:05

## 2019-05-21 RX ADMIN — CEFEPIME 2 G: 2 INJECTION, POWDER, FOR SOLUTION INTRAVENOUS at 03:05

## 2019-05-21 RX ADMIN — Medication 1 MCG: at 07:05

## 2019-05-21 RX ADMIN — Medication 2 MG: at 08:05

## 2019-05-21 RX ADMIN — INSULIN ASPART 5 UNITS: 100 INJECTION, SOLUTION INTRAVENOUS; SUBCUTANEOUS at 08:05

## 2019-05-21 RX ADMIN — INSULIN ASPART 7 UNITS: 100 INJECTION, SOLUTION INTRAVENOUS; SUBCUTANEOUS at 11:05

## 2019-05-21 RX ADMIN — CHLORHEXIDINE GLUCONATE 0.12% ORAL RINSE 15 ML: 1.2 LIQUID ORAL at 09:05

## 2019-05-21 RX ADMIN — PANTOPRAZOLE SODIUM 40 MG: 40 GRANULE, DELAYED RELEASE ORAL at 09:05

## 2019-05-21 RX ADMIN — MICONAZOLE NITRATE: 20 OINTMENT TOPICAL at 09:05

## 2019-05-21 RX ADMIN — HYDROMORPHONE HYDROCHLORIDE 1 MG: 1 INJECTION, SOLUTION INTRAMUSCULAR; INTRAVENOUS; SUBCUTANEOUS at 08:05

## 2019-05-21 RX ADMIN — ACETAMINOPHEN 650 MG: 325 TABLET ORAL at 09:05

## 2019-05-21 RX ADMIN — SILODOSIN 4 MG: 4 CAPSULE ORAL at 09:05

## 2019-05-21 RX ADMIN — Medication 2 MG: at 04:05

## 2019-05-21 RX ADMIN — VANCOMYCIN HYDROCHLORIDE 1500 MG: 100 INJECTION, POWDER, LYOPHILIZED, FOR SOLUTION INTRAVENOUS at 09:05

## 2019-05-21 NOTE — PROGRESS NOTES
[facility]  Colorectal Surgery  Progress Note    Patient Name: Mariann Huff  MRN: [id number]  Admission Date: 4/20/2019  Hospital Length of Stay: 31 days  Attending Physician: Robin Boyd MD    Subjective:     Interval History: no more bleeding after packing yesterday, H/H stable overnight after transfusion, NPO for flex sig this am    Post-Op Info:  Procedure(s) (LRB):  SIGMOIDOSCOPY, FLEXIBLE (N/A)   Day of Surgery      Medications:  Continuous Infusions:   dexmedetomidine (PRECEDEX) infusion Stopped (05/20/19 0651)     Scheduled Meds:   ceFEPime (MAXIPIME) IVPB  2 g Intravenous Q8H    chlorhexidine  15 mL Mouth/Throat BID    fluconazole 40 mg/ml  200 mg Per G Tube Daily    [START ON 5/22/2019] insulin aspart U-100  7 Units Subcutaneous Q24H    [START ON 5/22/2019] insulin aspart U-100  7 Units Subcutaneous Q24H    [START ON 5/22/2019] insulin aspart U-100  7 Units Subcutaneous Q24H    insulin aspart U-100  7 Units Subcutaneous Q24H    insulin aspart U-100  7 Units Subcutaneous Q24H    insulin aspart U-100  7 Units Subcutaneous Q24H    loperamide  2 mg Per G Tube QID    miconazole nitrate 2%   Topical (Top) BID    pantoprazole  40 mg Per G Tube Daily    rifAMpin (RIFADIN) IVPB  300 mg Intravenous Q12H    silodosin  4 mg Per G Tube Daily    vancomycin (VANCOCIN) IVPB  1,500 mg Intravenous Q24H     PRN Meds:   acetaminophen    albuterol-ipratropium    dextrose 50%    glucagon (human recombinant)    hydrALAZINE    HYDROmorphone    hydrOXYzine    insulin aspart U-100    labetalol    magnesium sulfate IVPB    magnesium sulfate IVPB    ondansetron    promethazine (PHENERGAN) IVPB    sodium chloride 0.9%        Objective:     Vital Signs (Most Recent):  Temp: 99.7 °F (37.6 °C) (05/21/19 1052)  Pulse: 84 (05/21/19 1120)  Resp: (!) 25 (05/21/19 1120)  BP: (!) 159/68 (05/21/19 0752)  SpO2: 97 % (05/21/19 1120) Vital Signs (24h Range):  Temp:  [98.5 °F (36.9 °C)-100.7 °F  (38.2 °C)] 99.7 °F (37.6 °C)  Pulse:  [] 84  Resp:  [13-46] 25  SpO2:  [87 %-100 %] 97 %  BP: ()/(25-82) 159/68  Arterial Line BP: (104-107)/() 104/95     Intake/Output - Last 3 Shifts       05/19 0700 - 05/20 0659 05/20 0700 - 05/21 0659 05/21 0700 - 05/22 0659    I.V. (mL/kg) 281 (3.7) 656 (8.6)     Blood 572 350     NG/GT 1340 940 340    IV Piggyback   0    Total Intake(mL/kg) 2193 (28.9) 1946 (25.6) 340 (4.5)    Urine (mL/kg/hr) 2220 (1.2) 1345 (0.7) 265 (0.7)    Stool  0 0    Total Output 2220 1345 265    Net -27 +601 +75           Stool Occurrence  4 x 1 x          Physical Exam   Constitutional: No distress.   Eyes: EOM are normal.   Neck: Neck supple.   Cardiovascular: Normal rate and regular rhythm.   Pulmonary/Chest: Effort normal. No respiratory distress.   Abdominal: Soft. She exhibits no distension. There is no tenderness.   Musculoskeletal: Normal range of motion.   Neurological: She is alert.   Skin: Skin is warm and dry.       Significant Labs:  BMP (Last 3 Results):   Recent Labs   Lab 05/20/19  0358 05/20/19  1118 05/21/19  0339   * 245* 197*    141 140   K 3.7 5.1 4.3   * 114* 111*   CO2 20* 22* 20*   BUN 43* 45* 39*   CREATININE 1.0 1.0 0.8   CALCIUM 8.5* 8.3* 8.7   MG 1.5* 1.8 2.0     CBC (Last 3 Results):   Recent Labs   Lab 05/20/19  1118 05/20/19  1718 05/21/19  0339   WBC 21.75* 22.62* 20.27*   RBC 2.32* 2.82* 2.64*   HGB 6.7* 8.3* 7.6*   HCT 20.7* 25.5* 23.9*    215 226   MCV 89 90 91   MCH 28.9 29.4 28.8   MCHC 32.4 32.5 31.8*       Significant Diagnostics:  I have reviewed all pertinent imaging results/findings within the past 24 hours.    Assessment/Plan:     BRBPR (bright red blood per rectum)  Ms. Huff is a 60 yo F with PMH of HTN, DM II, CAD, NSTEMI, s/p L5-S1 OLIF with NSGY 4/12 c/b intraoperative left ureteral injury and underwent ureteroureteral anastomoses and ureteral stent placement complicated by sepsis due to E.coli septicemia now  s/p ex lap on 4/21 and PEG/Trach of the vent now having BRBPR.     S/p flex sig on 5/13  Repeat flex this am showed persistent rectal ulcer, no active bleed or blood in rectal vault, scope advance to splenic flexure with no evidence of bleeding  Given non-bleeding appearance of ulcer, reasonable to r/o other sources with EGD/full colonoscopy  Transfuse as needed, cont to trend H/H  cont to hold hep gtt  Please call with any questions or concerns               John Barker MD  Colorectal Surgery  [facility]

## 2019-05-21 NOTE — ASSESSMENT & PLAN NOTE
Mariann Huff is a 59 y.o. female s/p left ureteral injury on 4/12, presented with fever, AMS, and intraabdominal abscess, taken for washout and ligation of left ureter with neph tube placement on 4/21, Trach/PEG 5/2.  - on vent with increased PEEP and FiO2; wean per SICU  - Tube feeds per SICU  - Vanc, cefepime, rifampin per ID recs   - 5/13 UCx growing Candida albicans; started 2-week course of Diflucan   - ID recommends reimaging patient should she continue to show signs of infection  - Maintain neph tube and Fontenot  - Urine output adequate, Cr stable at 0.8  - diuresis/fluids per SICU  - bloody BMs - H&H stable; patient scheduled for flexible sigmoidoscopy today    Dispo: continue ICU care; plan for transfer to LTAC once stable.

## 2019-05-21 NOTE — PROGRESS NOTES
"[facility]  Endocrinology  Progress Note    Admit Date: 4/20/2019     Reason for Consult: Management of T2DM, Hyperglycemia     Surgical Procedure and Date:       04/21/2019:         1. Exploratory laparotomy        2. Ligation of left ureter        3. Removal of left JJ ureteral stent      Diabetes diagnosis year: ANDRE    Home Diabetes Medications:  ANDRE  Toujeo 50 BID  Invokana 300mg daily   Novolog 35 units AM, 45 units PM, 35 units PM    How often checking glucose at home? ANDRE   BG readings on regimen: ANDRE  Hypoglycemia on the regimen?  ANDRE  Missed doses on regimen?  ANDRE    Diabetes Complications include:     Hyperglycemia and Diabetic retinopathy     Complicating diabetes co morbidities:   History of MI and MURTAZA, CAD, HLD    HPI:   Patient is a 59 y.o. female with a diagnosis of HTN, HLN, DM type 2, nonproliferative diabetic renopathy, CAD, NSTEMI, and back pain who presents to the ED with a complaint of altered mental status on 04/20/2019. Is now s/p Exploratory laparotomy, Ligation of left ureter, and Removal of left JJ ureteral stent. Endocrinology consulted to manage DM2/Hyperglycemia.    Lab Results   Component Value Date    HGBA1C 10.8 (H) 02/19/2019       Interval HPI:   Overnight events:   BG is now below goal on current SQ insulin regimen. NAEON.     Eating:   NPO  Nausea: No  Hypoglycemia and intervention: No  Fever: No  TPN and/or TF: No  If yes, type of TF/TPN and rate: none    BP (!) 159/68   Pulse 94   Temp 99 °F (37.2 °C) (Axillary)   Resp (!) 21   Ht 5' 5" (1.651 m)   Wt 76 kg (167 lb 8.8 oz)   SpO2 100%   Breastfeeding? No   BMI 27.88 kg/m²      Labs Reviewed and Include    Recent Labs   Lab 05/21/19  0339   *   CALCIUM 8.7   ALBUMIN 1.4*   PROT 5.5*      K 4.3   CO2 20*   *   BUN 39*   CREATININE 0.8   ALKPHOS 102   ALT <5*   AST 22   BILITOT 0.6     Lab Results   Component Value Date    WBC 20.27 (H) 05/21/2019    HGB 7.6 (L) 05/21/2019    HCT 23.9 " (L) 05/21/2019    MCV 91 05/21/2019     05/21/2019     No results for input(s): TSH, FREET4 in the last 168 hours.  Lab Results   Component Value Date    HGBA1C 10.8 (H) 02/19/2019       Nutritional status:   Body mass index is 27.88 kg/m².  Lab Results   Component Value Date    ALBUMIN 1.4 (L) 05/21/2019    ALBUMIN 1.4 (L) 05/20/2019    ALBUMIN 1.6 (L) 05/20/2019     No results found for: PREALBUMIN    Estimated Creatinine Clearance: 78.2 mL/min (based on SCr of 0.8 mg/dL).    Accu-Checks  Recent Labs     05/19/19  2008 05/20/19  0020 05/20/19  0406 05/20/19  0847 05/20/19  1158 05/20/19  1814 05/20/19  1950 05/20/19  2359 05/21/19  0338 05/21/19  0819   POCTGLUCOSE 227* 196* 279* 258* 282* 134* 132* 174* 228* 112*       Current Medications and/or Treatments Impacting Glycemic Control  Immunotherapy:    Immunosuppressants     None        Steroids:   Hormones (From admission, onward)    None        Pressors:    Autonomic Drugs (From admission, onward)    None        Hyperglycemia/Diabetes Medications:   Antihyperglycemics (From admission, onward)    Start     Stop Route Frequency Ordered    05/21/19 1200  insulin aspart U-100 pen 10 Units      -- SubQ Every 24 hours (non-standard times) 05/20/19 1546    05/21/19 0800  insulin aspart U-100 pen 10 Units      -- SubQ Every 24 hours (non-standard times) 05/20/19 1546    05/21/19 0400  insulin aspart U-100 pen 10 Units      -- SubQ Every 24 hours (non-standard times) 05/20/19 1546    05/21/19 0000  insulin aspart U-100 pen 10 Units      -- SubQ Every 24 hours (non-standard times) 05/20/19 1546    05/20/19 2000  insulin aspart U-100 pen 10 Units      -- SubQ Every 24 hours (non-standard times) 05/20/19 1546    05/20/19 1647  insulin aspart U-100 pen 1-10 Units      -- SubQ Every 6 hours PRN 05/20/19 1547    05/20/19 1600  insulin aspart U-100 pen 10 Units      -- SubQ Every 24 hours (non-standard times) 05/20/19 1546          ASSESSMENT and PLAN    * Ureteral  transection of left native kidney  Managed per primary team  Avoid hypoglycemia        Type 2 diabetes mellitus with diabetic peripheral angiopathy without gangrene  BG goal 140 - 180    Decrease Novolog 7 units q 4 hrs (BG is now below goal)  Hold if TFs are stopped.  continue to Moderate Dose SQ Insulin Correction Scale PRN for BG excursions. Patient has proven insulin resistance during admission. Cr wnl  BG monitoring q 4 hrs.     ADDENDUM:   Decrease Novolog further to 5 units q 4 hours BG remains below goal at 107 mg/dl).     ** Please notify Endocrine for any change and/or advance in diet/TF**  ** Please call Endocrine for any BG related issues **    Discharge Recommendations:     TBD.             CAD (coronary artery disease)  Managed per primary team  Condition may cause insulin resistance       HLD (hyperlipidemia)  Managed per primary team  Condition may cause insulin resistance         Sepsis  Infection may elevate BG readings  Most likely cause for recent BG excursions.   Managed per primary team  Avoid hypoglycemia    Lab Results   Component Value Date    WBC 20.27 (H) 05/21/2019    HGB 7.6 (L) 05/21/2019    HCT 23.9 (L) 05/21/2019    MCV 91 05/21/2019     05/21/2019         Sleep apnea  May affect BG readings if uncontrolled        On enteral nutrition  May cause BG excursions.           Uriah Holder, NP  Endocrinology  [facility]

## 2019-05-21 NOTE — SUBJECTIVE & OBJECTIVE
Interval History/Significant Events:     Continues to bleed. Received 2U PRBC yesterday.  Has received about 7U in the last ten days.    Partial colonoscopy to splenic flexure this morning. Ulcerations were seen to be improving, but there was evidence of recent bleeding. There was no blood at the splenic flexure. No diverticula.    She grew pseudomonas on BAL. On cefepime, vanc and rifampin. ID following.     Continues to be on and off the vent.    Following colonoscopy under sedation, she became hypotensive to SBPs in 60s. Placed in trendelenberg. Given 400 phenylephrine. Pressure responded. Stopped 1L bolus, which had been started.        Follow-up For: Procedure(s) (LRB):  SIGMOIDOSCOPY, FLEXIBLE (N/A)    Post-Operative Day: Day of Surgery    Objective:     Vital Signs (Most Recent):  Temp: 99.7 °F (37.6 °C) (05/21/19 1052)  Pulse: 84 (05/21/19 1120)  Resp: (!) 25 (05/21/19 1120)  BP: (!) 159/68 (05/21/19 0752)  SpO2: 97 % (05/21/19 1120) Vital Signs (24h Range):  Temp:  [98.5 °F (36.9 °C)-100.7 °F (38.2 °C)] 99.7 °F (37.6 °C)  Pulse:  [] 84  Resp:  [13-46] 25  SpO2:  [87 %-100 %] 97 %  BP: ()/(25-82) 159/68  Arterial Line BP: ()/() 104/95     Weight: 76 kg (167 lb 8.8 oz)  Body mass index is 27.88 kg/m².      Intake/Output Summary (Last 24 hours) at 5/21/2019 1122  Last data filed at 5/21/2019 1000  Gross per 24 hour   Intake 1646.01 ml   Output 1330 ml   Net 316.01 ml       Physical Exam   Constitutional: She appears well-developed and well-nourished.   HENT:   Head: Normocephalic and atraumatic.   Tracheostomy in place   Eyes: Right eye exhibits no discharge. Left eye exhibits no discharge.   Neck: Normal range of motion. Neck supple.   Cardiovascular: Normal rate and regular rhythm.   Pulmonary/Chest: Effort normal. No respiratory distress.   Abdominal: Soft.   Midline incision c/d/i.  PEG with no leak. Abdomen soft, non-distended.  Bowel sounds present   Genitourinary:   Genitourinary  Comments: Nephrostomy tube in place with yellow output.  Fontenot in place  Continues to have intermittent rectal bleeding   Musculoskeletal: Normal range of motion.   Neurological: She is alert.   Able to follow commands, denying pain.    Skin: Skin is warm and dry.   Psychiatric: She has a normal mood and affect.   Nursing note and vitals reviewed.      Vents:  Vent Mode: A/C (05/21/19 1120)  Ventilator Initiated: Yes (05/08/19 0630)  Set Rate: 20 bmp (05/21/19 1120)  Vt Set: 400 mL (05/21/19 1120)  Pressure Support: 10 cmH20 (05/19/19 1400)  PEEP/CPAP: 8 cmH20 (05/21/19 1120)  Oxygen Concentration (%): 101 (05/21/19 1120)  Peak Airway Pressure: 22 cmH2O (05/21/19 1120)  Plateau Pressure: 0 cmH20 (05/21/19 1120)  Total Ve: 6.4 mL (05/21/19 1120)  Negative Inspiratory Force (cm H2O): -18 (04/27/19 1306)  F/VT Ratio<105 (RSBI): (!) (P) 81.97 (05/21/19 1120)    Lines/Drains/Airways     Drain                 Nephrostomy 04/21/19 0629 Left 8 Fr. 30 days         Gastrostomy/Enterostomy 05/02/19 1134 Percutaneous endoscopic gastrostomy (PEG) LUQ decompression;feeding 18 days         Urethral Catheter 05/16/19 1040 16 Fr. 5 days          Airway                 Surgical Airway 05/02/19 1107 Shiley Cuffed 19 days          Peripheral Intravenous Line                 Midline Catheter Insertion/Assessment  - Single Lumen 05/07/19 1632 Left basilic vein (medial side of arm) 18g x 8cm 13 days         Peripheral IV - Single Lumen 05/21/19 0800 20 G Right Antecubital less than 1 day                Significant Labs:    CBC/Anemia Profile:  Recent Labs   Lab 05/20/19  1118 05/20/19  1718 05/21/19  0339   WBC 21.75* 22.62* 20.27*   HGB 6.7* 8.3* 7.6*   HCT 20.7* 25.5* 23.9*    215 226   MCV 89 90 91   RDW 15.0* 14.9* 14.9*        Chemistries:  Recent Labs   Lab 05/19/19  2030 05/20/19  0358 05/20/19  1118 05/21/19  0339    140 141 140   K 4.0 3.7 5.1 4.3   * 111* 114* 111*   CO2 22* 20* 22* 20*   BUN 43* 43* 45* 39*    CREATININE 1.1 1.0 1.0 0.8   CALCIUM 8.6* 8.5* 8.3* 8.7   ALBUMIN 1.6* 1.6* 1.4* 1.4*   PROT 5.7* 5.8*  --  5.5*   BILITOT 0.9 0.8  --  0.6   ALKPHOS 114 122  --  102   ALT <5* 6*  --  <5*   AST 25 24  --  22   MG  --  1.5* 1.8 2.0   PHOS  --  2.4* 2.4* 3.1       All pertinent labs within the past 24 hours have been reviewed.    Significant Imaging:  I have reviewed all pertinent imaging results/findings within the past 24 hours.

## 2019-05-21 NOTE — ASSESSMENT & PLAN NOTE
Ms. Huff is a 60 yo F with PMH of HTN, DM II, CAD, NSTEMI, s/p L5-S1 OLIF with NSGY 4/12 c/b intraoperative left ureteral injury and underwent ureteroureteral anastomoses and ureteral stent placement complicated by sepsis due to E.coli septicemia now s/p ex lap on 4/21 and PEG/Trach of the vent now having BRBPR.     S/p flex sig on 5/13  Repeat flex this am showed persistent rectal ulcer, no active bleed or blood in rectal vault, scope advance to splenic flexure with no evidence of bleeding  Given non-bleeding appearance of ulcer, reasonable to r/o other sources with EGD/full colonoscopy  Transfuse as needed, cont to trend H/H  cont to hold hep gtt  Please call with any questions or concerns

## 2019-05-21 NOTE — SUBJECTIVE & OBJECTIVE
Interval History: Patient with mild fevers overnight to 00.7, currently afebrile. Patient remains on vent, FiO2 50%. UOP from L nephrostomy and urethral catheter is adequate. WBC trending down (22 -> 20). Cr stable at 0.8. 5/19 tracheal aspirate growing presumptive Pseudomonas, patient on Vanc, cefepime, rifampin, and diflucan.     Review of Systems  Objective:     Temp:  [98.5 °F (36.9 °C)-100.7 °F (38.2 °C)] 98.5 °F (36.9 °C)  Pulse:  [] 92  Resp:  [13-35] 30  SpO2:  [89 %-100 %] 100 %  BP: (102-149)/(43-82) 106/58  Arterial Line BP: ()/() 104/95     Body mass index is 27.88 kg/m².      Bladder Scan Volume (mL): 27 mL (05/10/19 0846)  Post Void Cath Residual Output (mL): 27 mL (05/10/19 0846)    Drains     Drain                 Nephrostomy 04/21/19 0629 Left 8 Fr. 29 days         Gastrostomy/Enterostomy 05/02/19 1134 Percutaneous endoscopic gastrostomy (PEG) LUQ decompression;feeding 18 days         Urethral Catheter 05/16/19 1040 16 Fr. 4 days                Physical Exam   Constitutional: She appears well-developed. No distress.   HENT:   Head: Normocephalic and atraumatic.   Neck: No JVD present.   Cardiovascular: Normal rate and regular rhythm.    Pulmonary/Chest: Effort normal. No respiratory distress.   On vent   Abdominal: Soft. She exhibits no distension. There is no rebound and no guarding.   Incision c/d/i   G-tube   Genitourinary:   Genitourinary Comments: Fontenot in place draining clear yellow urine  Left neph tube with clear yellow urine   Neurological: She is alert.   Skin: Skin is warm and dry. She is not diaphoretic. No pallor.         Significant Labs:    BMP:  Recent Labs   Lab 05/20/19  0358 05/20/19  1118 05/21/19  0339    141 140   K 3.7 5.1 4.3   * 114* 111*   CO2 20* 22* 20*   BUN 43* 45* 39*   CREATININE 1.0 1.0 0.8   CALCIUM 8.5* 8.3* 8.7       CBC:   Recent Labs   Lab 05/20/19  1118 05/20/19  1718 05/21/19  0339   WBC 21.75* 22.62* 20.27*   HGB 6.7* 8.3* 7.6*    HCT 20.7* 25.5* 23.9*    215 226       Significant Imaging:  All pertinent imaging results/findings from the past 24 hours have been reviewed.

## 2019-05-21 NOTE — ASSESSMENT & PLAN NOTE
BG goal 140 - 180    Decrease Novolog 7 units q 4 hrs (BG is now below goal)  Hold if TFs are stopped.  continue to Moderate Dose SQ Insulin Correction Scale PRN for BG excursions. Patient has proven insulin resistance during admission. Cr wnl  BG monitoring q 4 hrs.     ADDENDUM:   Decrease Novolog further to 5 units q 4 hours BG remains below goal at 107 mg/dl).     ** Please notify Endocrine for any change and/or advance in diet/TF**  ** Please call Endocrine for any BG related issues **    Discharge Recommendations:     TBD.

## 2019-05-21 NOTE — ASSESSMENT & PLAN NOTE
Infection may elevate BG readings  Most likely cause for recent BG excursions.   Managed per primary team  Avoid hypoglycemia    Lab Results   Component Value Date    WBC 20.27 (H) 05/21/2019    HGB 7.6 (L) 05/21/2019    HCT 23.9 (L) 05/21/2019    MCV 91 05/21/2019     05/21/2019

## 2019-05-21 NOTE — SUBJECTIVE & OBJECTIVE
Subjective:     Interval History: no more bleeding after packing yesterday, H/H stable overnight after transfusion, NPO for flex sig this am    Post-Op Info:  Procedure(s) (LRB):  SIGMOIDOSCOPY, FLEXIBLE (N/A)   Day of Surgery      Medications:  Continuous Infusions:   dexmedetomidine (PRECEDEX) infusion Stopped (05/20/19 0651)     Scheduled Meds:   ceFEPime (MAXIPIME) IVPB  2 g Intravenous Q8H    chlorhexidine  15 mL Mouth/Throat BID    fluconazole 40 mg/ml  200 mg Per G Tube Daily    [START ON 5/22/2019] insulin aspart U-100  7 Units Subcutaneous Q24H    [START ON 5/22/2019] insulin aspart U-100  7 Units Subcutaneous Q24H    [START ON 5/22/2019] insulin aspart U-100  7 Units Subcutaneous Q24H    insulin aspart U-100  7 Units Subcutaneous Q24H    insulin aspart U-100  7 Units Subcutaneous Q24H    insulin aspart U-100  7 Units Subcutaneous Q24H    loperamide  2 mg Per G Tube QID    miconazole nitrate 2%   Topical (Top) BID    pantoprazole  40 mg Per G Tube Daily    rifAMpin (RIFADIN) IVPB  300 mg Intravenous Q12H    silodosin  4 mg Per G Tube Daily    vancomycin (VANCOCIN) IVPB  1,500 mg Intravenous Q24H     PRN Meds:   acetaminophen    albuterol-ipratropium    dextrose 50%    glucagon (human recombinant)    hydrALAZINE    HYDROmorphone    hydrOXYzine    insulin aspart U-100    labetalol    magnesium sulfate IVPB    magnesium sulfate IVPB    ondansetron    promethazine (PHENERGAN) IVPB    sodium chloride 0.9%        Objective:     Vital Signs (Most Recent):  Temp: 99.7 °F (37.6 °C) (05/21/19 1052)  Pulse: 84 (05/21/19 1120)  Resp: (!) 25 (05/21/19 1120)  BP: (!) 159/68 (05/21/19 0752)  SpO2: 97 % (05/21/19 1120) Vital Signs (24h Range):  Temp:  [98.5 °F (36.9 °C)-100.7 °F (38.2 °C)] 99.7 °F (37.6 °C)  Pulse:  [] 84  Resp:  [13-46] 25  SpO2:  [87 %-100 %] 97 %  BP: ()/(25-82) 159/68  Arterial Line BP: (104-107)/() 104/95     Intake/Output - Last 3 Shifts       05/19  0700 - 05/20 0659 05/20 0700 - 05/21 0659 05/21 0700 - 05/22 0659    I.V. (mL/kg) 281 (3.7) 656 (8.6)     Blood 572 350     NG/GT 1340 940 340    IV Piggyback   0    Total Intake(mL/kg) 2193 (28.9) 1946 (25.6) 340 (4.5)    Urine (mL/kg/hr) 2220 (1.2) 1345 (0.7) 265 (0.7)    Stool  0 0    Total Output 2220 1345 265    Net -27 +601 +75           Stool Occurrence  4 x 1 x          Physical Exam   Constitutional: No distress.   Eyes: EOM are normal.   Neck: Neck supple.   Cardiovascular: Normal rate and regular rhythm.   Pulmonary/Chest: Effort normal. No respiratory distress.   Abdominal: Soft. She exhibits no distension. There is no tenderness.   Musculoskeletal: Normal range of motion.   Neurological: She is alert.   Skin: Skin is warm and dry.       Significant Labs:  BMP (Last 3 Results):   Recent Labs   Lab 05/20/19  0358 05/20/19  1118 05/21/19  0339   * 245* 197*    141 140   K 3.7 5.1 4.3   * 114* 111*   CO2 20* 22* 20*   BUN 43* 45* 39*   CREATININE 1.0 1.0 0.8   CALCIUM 8.5* 8.3* 8.7   MG 1.5* 1.8 2.0     CBC (Last 3 Results):   Recent Labs   Lab 05/20/19  1118 05/20/19  1718 05/21/19  0339   WBC 21.75* 22.62* 20.27*   RBC 2.32* 2.82* 2.64*   HGB 6.7* 8.3* 7.6*   HCT 20.7* 25.5* 23.9*    215 226   MCV 89 90 91   MCH 28.9 29.4 28.8   MCHC 32.4 32.5 31.8*       Significant Diagnostics:  I have reviewed all pertinent imaging results/findings within the past 24 hours.

## 2019-05-21 NOTE — PROGRESS NOTES
[facility]  Critical Care - Surgery  Progress Note    Patient Name: Mariann Huff  MRN: [id number]  Admission Date: 4/20/2019  Hospital Length of Stay: 31 days  Code Status: Full Code  Attending Provider: Robin Boyd MD  Primary Care Provider: Jasbir Haney MD   Principal Problem: Ureteral transection of left native kidney    Subjective:     Hospital/ICU Course:  The patient has had 2 episodes of bradycardia during the day.  Early in the morning, the patient had difficulty breathing and a mucus plug was found in the inner cannula of the tracheostomy.  She developed bradycardia but never arrested.  She also developed hypotension.  This was followed subsequently after she was bagged (Ambu) with tachypnea and hypertension and tachycardia.  Oxygen saturations were in the low 90s, and the arterial blood gas at that time revealed a significant alveolar arterial gradient with adequate ventilation.  Chest x-ray, which I personally reviewed, revealed what appeared to be a significant fluid overload.  We did perform an echocardiogram urgently, which I personally was present and reviewed the results.  There is no evidence of heart failure.  EKG was reported as normal and there is no evidence that the patient had an acute myocardial event.  The chest x-ray did suggest a significant amount of pulmonary edema. There was also a concern, because the the history of upper body deep venous thrombosis, that this could be a pulmonary embolism.  A CT scan of the chest , which I have personally reviewed show bilateral infiltrates compatible with pulmonary edema of non cardiogenic origin.  It was not possible to rule out a pulmonary embolism.  The patient is anticoagulated currently.  Ultrasound of the lower extremities was done and they do not show any deep venous thrombosis.    The patient did receive some fluids, because of her history of acute kidney injury, which has resolved and because she was receiving IV  contrast.  She has maintained a good urinary output during the day.  She has mostly being hemodynamically stable and her tachycardia resolved.  She remains sedated and on the ventilator.  Follow-up chest x-ray, which I personally reviewed, showed resolving pulmonary edema. Her oxygenation and oxygen saturations improved and we were able to wean the patient FiO2 down to 50%.    Of note, I did perform a bronchoscopy, which failed to reveal any amount of pus or mucus plugging.  There was also no evidence of aspiration.  BAL was performed and was sent.    A 2nd episode of bradycardia was observed in the afternoon.  This responded to the use of atropine and 0.1 mg of epinephrine.  She did have a tachycardic response with this which subsided rapidly.  She remained otherwise hemodynamically stable.  Arterial blood gases, reveal now a PO2 of 300 with adequate pCO2 and pH.  Mild hyperventilation.  She remains comfortable.  I have asked Cardiology to re-evaluate this patient.  For now she remains off pressors.    Neurologically the patient has been intact. She is awake alert and oriented with a nonfocal exam.    Her abdomen remains soft.  She has been NPO for now.  She has not had any further episodes of lower GI bleed.    The patient is having a good urinary output BUN and creatinine have been grossly within normal limits with a slight elevation of BUN.  Electrolytes are being followed and replaced as necessary.    Currently no evidence of infection in this patient.  Empirically I started antibiotics, but will stop them in the next 24-48 hours.    I have had the chance to talk to the patient's  at length and in detail.  I have explained our findings, I will keep him up-to-date as to any progress in understanding the etiology of these episodes and will wait continuing to talk to him as often as necessary.      Interval History/Significant Events:     Continues to bleed. Received 2U PRBC yesterday.  Has received about  7U in the last ten days.    Partial colonoscopy to splenic flexure this morning. Ulcerations were seen to be improving, but there was evidence of recent bleeding. There was no blood at the splenic flexure. No diverticula.    She grew pseudomonas on BAL. On cefepime, vanc and rifampin. ID following.     Continues to be on and off the vent.    Following colonoscopy under sedation, she became hypotensive to SBPs in 60s. Placed in trendelenberg. Given 400 phenylephrine. Pressure responded. Stopped 1L bolus, which had been started.        Follow-up For: Procedure(s) (LRB):  SIGMOIDOSCOPY, FLEXIBLE (N/A)    Post-Operative Day: Day of Surgery    Objective:     Vital Signs (Most Recent):  Temp: 99.7 °F (37.6 °C) (05/21/19 1052)  Pulse: 84 (05/21/19 1120)  Resp: (!) 25 (05/21/19 1120)  BP: (!) 159/68 (05/21/19 0752)  SpO2: 97 % (05/21/19 1120) Vital Signs (24h Range):  Temp:  [98.5 °F (36.9 °C)-100.7 °F (38.2 °C)] 99.7 °F (37.6 °C)  Pulse:  [] 84  Resp:  [13-46] 25  SpO2:  [87 %-100 %] 97 %  BP: ()/(25-82) 159/68  Arterial Line BP: ()/() 104/95     Weight: 76 kg (167 lb 8.8 oz)  Body mass index is 27.88 kg/m².      Intake/Output Summary (Last 24 hours) at 5/21/2019 1122  Last data filed at 5/21/2019 1000  Gross per 24 hour   Intake 1646.01 ml   Output 1330 ml   Net 316.01 ml       Physical Exam   Constitutional: She appears well-developed and well-nourished.   HENT:   Head: Normocephalic and atraumatic.   Tracheostomy in place   Eyes: Right eye exhibits no discharge. Left eye exhibits no discharge.   Neck: Normal range of motion. Neck supple.   Cardiovascular: Normal rate and regular rhythm.   Pulmonary/Chest: Effort normal. No respiratory distress.   Abdominal: Soft.   Midline incision c/d/i.  PEG with no leak. Abdomen soft, non-distended.  Bowel sounds present   Genitourinary:   Genitourinary Comments: Nephrostomy tube in place with yellow output.  Fontenot in place  Continues to have intermittent  rectal bleeding   Musculoskeletal: Normal range of motion.   Neurological: She is alert.   Able to follow commands, denying pain.    Skin: Skin is warm and dry.   Psychiatric: She has a normal mood and affect.   Nursing note and vitals reviewed.      Vents:  Vent Mode: A/C (05/21/19 1120)  Ventilator Initiated: Yes (05/08/19 0630)  Set Rate: 20 bmp (05/21/19 1120)  Vt Set: 400 mL (05/21/19 1120)  Pressure Support: 10 cmH20 (05/19/19 1400)  PEEP/CPAP: 8 cmH20 (05/21/19 1120)  Oxygen Concentration (%): 101 (05/21/19 1120)  Peak Airway Pressure: 22 cmH2O (05/21/19 1120)  Plateau Pressure: 0 cmH20 (05/21/19 1120)  Total Ve: 6.4 mL (05/21/19 1120)  Negative Inspiratory Force (cm H2O): -18 (04/27/19 1306)  F/VT Ratio<105 (RSBI): (!) (P) 81.97 (05/21/19 1120)    Lines/Drains/Airways     Drain                 Nephrostomy 04/21/19 0629 Left 8 Fr. 30 days         Gastrostomy/Enterostomy 05/02/19 1134 Percutaneous endoscopic gastrostomy (PEG) LUQ decompression;feeding 18 days         Urethral Catheter 05/16/19 1040 16 Fr. 5 days          Airway                 Surgical Airway 05/02/19 1107 Shiley Cuffed 19 days          Peripheral Intravenous Line                 Midline Catheter Insertion/Assessment  - Single Lumen 05/07/19 1632 Left basilic vein (medial side of arm) 18g x 8cm 13 days         Peripheral IV - Single Lumen 05/21/19 0800 20 G Right Antecubital less than 1 day                Significant Labs:    CBC/Anemia Profile:  Recent Labs   Lab 05/20/19  1118 05/20/19  1718 05/21/19  0339   WBC 21.75* 22.62* 20.27*   HGB 6.7* 8.3* 7.6*   HCT 20.7* 25.5* 23.9*    215 226   MCV 89 90 91   RDW 15.0* 14.9* 14.9*        Chemistries:  Recent Labs   Lab 05/19/19  2030 05/20/19  0358 05/20/19  1118 05/21/19  0339    140 141 140   K 4.0 3.7 5.1 4.3   * 111* 114* 111*   CO2 22* 20* 22* 20*   BUN 43* 43* 45* 39*   CREATININE 1.1 1.0 1.0 0.8   CALCIUM 8.6* 8.5* 8.3* 8.7   ALBUMIN 1.6* 1.6* 1.4* 1.4*   PROT 5.7* 5.8*   --  5.5*   BILITOT 0.9 0.8  --  0.6   ALKPHOS 114 122  --  102   ALT <5* 6*  --  <5*   AST 25 24  --  22   MG  --  1.5* 1.8 2.0   PHOS  --  2.4* 2.4* 3.1       All pertinent labs within the past 24 hours have been reviewed.    Significant Imaging:  I have reviewed all pertinent imaging results/findings within the past 24 hours.    Assessment/Plan:     * Ureteral transection of left native kidney  ASSESSMENT/PLAN:   Mariann Huff is a 59 y.o. female s/p left ureteral injury on 4/12, who presented with fever, AMS, and intraabdominal abscess, taken for washout and ligation of left ureter with nephrostomy tube placement on 4/21. S/p Trach/PEG on 5/2. Episode of negative pressure pulmonary edema on 5/8 requiring mechanical ventilation, diuresis and low dose pressor. Has been plagued by intermittent BRBPR.     Neuro:   - off sedation  - responds to questions appropriately by nodding  - no focal deficit     Pulmonary:   Moved from PAV+ to trach collar on 5/17; back to PAV+ 5/19; then again back on rate 5/19  - CXR bilateral haziness  - ABGs prn  - diuresis as tolerates; getting toward euvolemia (5/21)     Cardiac:   - alternating HTN and hypotension  - TTE 5/8 with no evidence of right heart strain or significant valvular pathology, normal LV systolic function, EF 70%     Renal:    - S/p transection of left ureter 4/12 and subsequent ligation and PCT nephrostomy tube placement with IR 4/21  - Fontenot removed 5/15, but then replaced for retention   - BUN/Cr falling  - Monitor I/Os       Intake/Output Summary (Last 24 hours) at 5/21/2019 1126  Last data filed at 5/21/2019 1000  Gross per 24 hour   Intake 1646.01 ml   Output 1330 ml   Net 316.01 ml        ID:   - Tmax 100.7  - increase in WBC again  - Blood 5/10 NGTD  - Ucx 5/13 candiduria  - BAL 5/18 pseudomonas  - C diff 5/5 negative  - vanc, cefepime and rifampin (spine hardware)     Hem/Onc:   - 2U PRBC transfused yesterday  - H/H fell again today; will check midday  -  check Hgb prn for bleeding     Endocrine:  - DM Type 2  - TF @ goal  - LDSSI  - Endocrine following for assistance with blood glucose control.      Fluids/Electrolytes/Nutrition/GI:   - Free water flushes 300 cc q6h  - Replace electrolytes PRN     # rectal ulcers  - intermittent hematochezia; ulcers seen to be improving on partial colonoscopy 5/21  - anoscopy and hemostatic agent applied by CRS 5/15  - imodium QID     PPx:  - DVT: Lovenox, Hep gtt held for continued bloody BMs        DISPO:  - Continue SICU care  - Preparing for LTACH once bloody BMs are managed            Critical secondary to Patient has a condition that poses threat to life and bodily function: Severe Respiratory Distress and active GIB     Critical care was time spent personally by me on the following activities: development of treatment plan with patient or surrogate and bedside caregivers, discussions with consultants, evaluation of patient's response to treatment, examination of patient, ordering and performing treatments and interventions, ordering and review of laboratory studies, ordering and review of radiographic studies, pulse oximetry, re-evaluation of patient's condition.  This critical care time did not overlap with that of any other provider or involve time for any procedures.     ISABELLE Carter MD  Critical Care - Surgery  [facility]

## 2019-05-21 NOTE — NURSING
Dr Du (SICU) notified Aflutter/AFib for a few seconds.  Orders given for EKG    1146 - Dr Carter (SICU) notified pt goes in Aflutter with 's.  Only sustains for a few seconds and unable to capture on EKG.  Dr Carter at bedside assessing pt. Rhythm captured on bedside monitor.  Telemetry strip given to Dr Carter.  Will continue to monitor

## 2019-05-21 NOTE — SUBJECTIVE & OBJECTIVE
"Interval HPI:   Overnight events:   BG is now below goal on current SQ insulin regimen. NAEON.     Eating:   NPO  Nausea: No  Hypoglycemia and intervention: No  Fever: No  TPN and/or TF: No  If yes, type of TF/TPN and rate: none    BP (!) 159/68   Pulse 94   Temp 99 °F (37.2 °C) (Axillary)   Resp (!) 21   Ht 5' 5" (1.651 m)   Wt 76 kg (167 lb 8.8 oz)   SpO2 100%   Breastfeeding? No   BMI 27.88 kg/m²     Labs Reviewed and Include    Recent Labs   Lab 05/21/19  0339   *   CALCIUM 8.7   ALBUMIN 1.4*   PROT 5.5*      K 4.3   CO2 20*   *   BUN 39*   CREATININE 0.8   ALKPHOS 102   ALT <5*   AST 22   BILITOT 0.6     Lab Results   Component Value Date    WBC 20.27 (H) 05/21/2019    HGB 7.6 (L) 05/21/2019    HCT 23.9 (L) 05/21/2019    MCV 91 05/21/2019     05/21/2019     No results for input(s): TSH, FREET4 in the last 168 hours.  Lab Results   Component Value Date    HGBA1C 10.8 (H) 02/19/2019       Nutritional status:   Body mass index is 27.88 kg/m².  Lab Results   Component Value Date    ALBUMIN 1.4 (L) 05/21/2019    ALBUMIN 1.4 (L) 05/20/2019    ALBUMIN 1.6 (L) 05/20/2019     No results found for: PREALBUMIN    Estimated Creatinine Clearance: 78.2 mL/min (based on SCr of 0.8 mg/dL).    Accu-Checks  Recent Labs     05/19/19  2008 05/20/19  0020 05/20/19  0406 05/20/19  0847 05/20/19  1158 05/20/19  1814 05/20/19  1950 05/20/19  2359 05/21/19  0338 05/21/19  0819   POCTGLUCOSE 227* 196* 279* 258* 282* 134* 132* 174* 228* 112*       Current Medications and/or Treatments Impacting Glycemic Control  Immunotherapy:    Immunosuppressants     None        Steroids:   Hormones (From admission, onward)    None        Pressors:    Autonomic Drugs (From admission, onward)    None        Hyperglycemia/Diabetes Medications:   Antihyperglycemics (From admission, onward)    Start     Stop Route Frequency Ordered    05/21/19 1200  insulin aspart U-100 pen 10 Units      -- SubQ Every 24 hours " (non-standard times) 05/20/19 1546    05/21/19 0800  insulin aspart U-100 pen 10 Units      -- SubQ Every 24 hours (non-standard times) 05/20/19 1546    05/21/19 0400  insulin aspart U-100 pen 10 Units      -- SubQ Every 24 hours (non-standard times) 05/20/19 1546    05/21/19 0000  insulin aspart U-100 pen 10 Units      -- SubQ Every 24 hours (non-standard times) 05/20/19 1546    05/20/19 2000  insulin aspart U-100 pen 10 Units      -- SubQ Every 24 hours (non-standard times) 05/20/19 1546    05/20/19 1647  insulin aspart U-100 pen 1-10 Units      -- SubQ Every 6 hours PRN 05/20/19 1547    05/20/19 1600  insulin aspart U-100 pen 10 Units      -- SubQ Every 24 hours (non-standard times) 05/20/19 1546

## 2019-05-21 NOTE — PLAN OF CARE
Problem: Adult Inpatient Plan of Care  Goal: Plan of Care Review  Outcome: Ongoing (interventions implemented as appropriate)  Patient on 50% and 8 of PEEP spontaneous ventilation, all other VSS through shift. Patient did not complain of pain through shift. Bloody bowel movements frequent through shift, MDs aware. Nephrostomy emptied PRN, output and correa output in flow sheet. Tube feeds off at 6PM for scope in AM.  Labs drawn, PRNs given, all questions answered by RN.

## 2019-05-21 NOTE — ANESTHESIA POSTPROCEDURE EVALUATION
Anesthesia Post Evaluation    Patient: Mariann Huff    Procedure(s) Performed: Procedure(s) (LRB):  SIGMOIDOSCOPY, FLEXIBLE (N/A)    Final Anesthesia Type: general  Patient location during evaluation: ICU  Patient participation: No - Unable to Participate, Sedation  Level of consciousness: awake and alert and oriented  Post-procedure vital signs: reviewed and stable  Pain management: adequate  Airway patency: patent  PONV status at discharge: No PONV  Anesthetic complications: no      Cardiovascular status: blood pressure returned to baseline and hemodynamically stable  Respiratory status: ventilator (trach on pressure support)  Hydration status: euvolemic  Follow-up not needed.          Vitals Value Taken Time   /72 5/21/2019  8:31 AM   Temp 36.9 °C (98.5 °F) 5/20/2019 11:00 PM   Pulse 87 5/21/2019  8:35 AM   Resp 16 5/21/2019  8:35 AM   SpO2 100 % 5/21/2019  8:35 AM   Vitals shown include unvalidated device data.      No case tracking events are documented in the log.      Pain/Derick Score: Pain Rating Prior to Med Admin: 7 (5/20/2019  6:00 PM)

## 2019-05-21 NOTE — TRANSFER OF CARE
"Anesthesia Transfer of Care Note    Patient: Mariann Huff    Procedure(s) Performed: Procedure(s) (LRB):  SIGMOIDOSCOPY, FLEXIBLE (N/A)  Bedside report to ICU RN    Patient location: ICU    Anesthesia Type: general    Post pain: adequate analgesia    Post assessment: no apparent anesthetic complications    Post vital signs: stable    Level of consciousness: sedated    Nausea/Vomiting: no nausea/vomiting    Complications: none    Transfer of care protocol was followed      Last vitals:   Visit Vitals  BP (!) 106/58 (BP Location: Right arm, Patient Position: Lying)   Pulse 92   Temp 36.9 °C (98.5 °F) (Oral)   Resp (!) 30   Ht 5' 5" (1.651 m)   Wt 76 kg (167 lb 8.8 oz)   SpO2 100%   Breastfeeding? No   BMI 27.88 kg/m²     "

## 2019-05-21 NOTE — PT/OT/SLP PROGRESS
Occupational Therapy      Patient Name:  Mariann Huff   MRN:  [id number]    Patient not seen today secondary to pt in A-fib/A-flutter upon OT arrival at 0919 and RN ordering 12 lead EKG. Will follow-up at a later date.    PRIMITIVO Dash  5/21/2019

## 2019-05-21 NOTE — PHYSICIAN QUERY
PT Name: Mariann Huff  MR #: [id number]    Physician Query Form - Cause and Effect Relationship Clarification      CDS/: Liliane Durham RN            Contact information: [phone]    This form is a permanent document in the medical record.     Query Date: May 21, 2019    By submitting this query, we are merely seeking further clarification of documentation. Please utilize your independent clinical judgment when addressing the question(s) below.    The Medical record contains the following:  Supporting Clinical Findings   Location in record      PNA - endotracheal aspirated with GNR                      Continue vanc/cefepime for HCAP                                                                                                      tracheal aspirate growing presumptive Pseudomonas, patient on Vanc, cefepime, rifampin, and diflucan.                                                             5/20 ID note        5/20 Urology PN     PRESUMPTIVE PSEUDOMONAS SPECIES   Many                                                                                                                                                                                          5/19 Respiratory endotracheal aspirate         Provider, please clarify if there is any correlation between HCAP and Pseudomonas.           Are the conditions:      [  ] Due to or associated with each other   [x  ] Unrelated to each other   [  ] Other (Please Specify): _________________________   [  ] Clinically Undetermined

## 2019-05-21 NOTE — PHYSICIAN QUERY
PT Name: Mariann Huff  MR #: [id number]    Physician Query Form - Cause and Effect Relationship Clarification      CDS/: Liliane Durham RN               Contact information: [phone]    This form is a permanent document in the medical record.     Query Date: May 21, 2019    By submitting this query, we are merely seeking further clarification of documentation. Please utilize your independent clinical judgment when addressing the question(s) below.    The Medical record contains the following:  Supporting Clinical Findings   Location in record    Admitted to MICU 4/20/19 for sepsis possibly 2/2 abscess and UTI             5/13 UCx growing Candida albicans; started 2-week course of Diflucan                                                                                                                                                                                     4/20 HP        5/21 Urology    LAZARUS ALBICANS   > 100,000 cfu/ml                                                                                                                                                                                              5/13 Urine culture         Provider, please clarify if there is any correlation between UTI  and Candida albicans.           Are the conditions:      [  ] Due to or associated with each other   [  ] Unrelated to each other   [ x ] Other (Please Specify): __candida infection resulted in usages of antibiotics _______________________   [  ] Clinically Undetermined

## 2019-05-21 NOTE — ASSESSMENT & PLAN NOTE
ASSESSMENT/PLAN:   Mariann Huff is a 59 y.o. female s/p left ureteral injury on 4/12, who presented with fever, AMS, and intraabdominal abscess, taken for washout and ligation of left ureter with nephrostomy tube placement on 4/21. S/p Trach/PEG on 5/2. Episode of negative pressure pulmonary edema on 5/8 requiring mechanical ventilation, diuresis and low dose pressor. Has been plagued by intermittent BRBPR.     Neuro:   - off sedation  - responds to questions appropriately by nodding  - no focal deficit     Pulmonary:   Moved from PAV+ to trach collar on 5/17; back to PAV+ 5/19; then again back on rate 5/19  - CXR bilateral haziness  - ABGs prn  - diuresis as tolerates; getting toward euvolemia (5/21)     Cardiac:   - alternating HTN and hypotension  - TTE 5/8 with no evidence of right heart strain or significant valvular pathology, normal LV systolic function, EF 70%     Renal:    - S/p transection of left ureter 4/12 and subsequent ligation and PCT nephrostomy tube placement with IR 4/21  - Fontenot removed 5/15, but then replaced for retention   - BUN/Cr falling  - Monitor I/Os       Intake/Output Summary (Last 24 hours) at 5/21/2019 1126  Last data filed at 5/21/2019 1000  Gross per 24 hour   Intake 1646.01 ml   Output 1330 ml   Net 316.01 ml        ID:   - Tmax 100.7  - increase in WBC again  - Blood 5/10 NGTD  - Ucx 5/13 candiduria  - BAL 5/18 pseudomonas  - C diff 5/5 negative  - vanc, cefepime and rifampin (spine hardware)     Hem/Onc:   - 2U PRBC transfused yesterday  - H/H fell again today; will check midday  - check Hgb prn for bleeding     Endocrine:  - DM Type 2  - TF @ goal  - LDSSI  - Endocrine following for assistance with blood glucose control.      Fluids/Electrolytes/Nutrition/GI:   - Free water flushes 300 cc q6h  - Replace electrolytes PRN     # rectal ulcers  - intermittent hematochezia; ulcers seen to be improving on partial colonoscopy 5/21  - anoscopy and hemostatic agent applied by CRS  5/15  - imodium QID     PPx:  - DVT: Lovenox, Hep gtt held for continued bloody BMs        DISPO:  - Continue SICU care  - Preparing for LTACH once bloody BMs are managed

## 2019-05-21 NOTE — PROGRESS NOTES
[facility]  Urology  Progress Note    Patient Name: Mariann Huff  MRN: [id number]  Admission Date: 4/20/2019  Hospital Length of Stay: 31 days  Code Status: Full Code   Attending Provider: Robin Boyd MD   Primary Care Physician: Jasbir Haney MD    Subjective:     HPI:  Mariann Huff is a 59 y.o. female with history of HTN, type 2 diabetes mellitus, CAD, NSTEMI, and back pain who presents to [facility] with altered mental status and sepsis. She underwent L5-S1 OLIF with NSGY on 4/12 and had intraoperative left ureteral injury with ureteroureteral anastomosis and ureteral stent placement. She did well initially and had correa and MADHURI drain removed on 4/16. She began having chills and altered mental status 2 days ago and this has progressively worsened. No complaints of pain.     She is altered and HPI is limited. In the ED she is febrile to 103 and tachycardic and pressures are low normal. WBC is 5, creatinine is 3.6 baseline 1.0, lactate is 4.6. Cath UA concerning for infection, 3+ LE, >100 WBCs, and many bacteria on microscopy.    CT and MRI abdomen both show air with minimal fluid near the surgical site with left ureter coursing through. There is air throughout the proximal collecting system which is decompressed with JJ ureteral stent in good position.    Taken for ex lap, ligation of left ureter and left neph tube placement on 4/21/19.    Interval History: Patient with mild fevers overnight to 00.7, currently afebrile. Patient remains on vent, FiO2 50%. UOP from L nephrostomy and urethral catheter is adequate. WBC trending down (22 -> 20). Cr stable at 0.8. 5/19 tracheal aspirate growing presumptive Pseudomonas, patient on Vanc, cefepime, rifampin, and diflucan.     Review of Systems  Objective:     Temp:  [98.5 °F (36.9 °C)-100.7 °F (38.2 °C)] 98.5 °F (36.9 °C)  Pulse:  [] 92  Resp:  [13-35] 30  SpO2:  [89 %-100 %] 100 %  BP: (102-149)/(43-82) 106/58  Arterial Line BP: ()/()  104/95     Body mass index is 27.88 kg/m².      Bladder Scan Volume (mL): 27 mL (05/10/19 0846)  Post Void Cath Residual Output (mL): 27 mL (05/10/19 0846)    Drains     Drain                 Nephrostomy 04/21/19 0629 Left 8 Fr. 29 days         Gastrostomy/Enterostomy 05/02/19 1134 Percutaneous endoscopic gastrostomy (PEG) LUQ decompression;feeding 18 days         Urethral Catheter 05/16/19 1040 16 Fr. 4 days                Physical Exam   Constitutional: She appears well-developed. No distress.   HENT:   Head: Normocephalic and atraumatic.   Neck: No JVD present.   Cardiovascular: Normal rate and regular rhythm.    Pulmonary/Chest: Effort normal. No respiratory distress.   On vent   Abdominal: Soft. She exhibits no distension. There is no rebound and no guarding.   Incision c/d/i   G-tube   Genitourinary:   Genitourinary Comments: Fontenot in place draining clear yellow urine  Left neph tube with clear yellow urine   Neurological: She is alert.   Skin: Skin is warm and dry. She is not diaphoretic. No pallor.         Significant Labs:    BMP:  Recent Labs   Lab 05/20/19  0358 05/20/19  1118 05/21/19  0339    141 140   K 3.7 5.1 4.3   * 114* 111*   CO2 20* 22* 20*   BUN 43* 45* 39*   CREATININE 1.0 1.0 0.8   CALCIUM 8.5* 8.3* 8.7       CBC:   Recent Labs   Lab 05/20/19  1118 05/20/19  1718 05/21/19  0339   WBC 21.75* 22.62* 20.27*   HGB 6.7* 8.3* 7.6*   HCT 20.7* 25.5* 23.9*    215 226       Significant Imaging:  All pertinent imaging results/findings from the past 24 hours have been reviewed.                  Assessment/Plan:     * Ureteral transection of left native kidney  Mariann Huff is a 59 y.o. female s/p left ureteral injury on 4/12, presented with fever, AMS, and intraabdominal abscess, taken for washout and ligation of left ureter with neph tube placement on 4/21, Trach/PEG 5/2.  - on vent with increased PEEP and FiO2; wean per SICU  - Tube feeds per SICU  - Vanc, cefepime, rifampin per  ID recs   - 5/13 UCx growing Candida albicans; started 2-week course of Diflucan   - ID recommends reimaging patient should she continue to show signs of infection  - Maintain neph tube and Fontenot  - Urine output adequate, Cr stable at 0.8  - diuresis/fluids per SICU  - bloody BMs - H&H stable; patient scheduled for flexible sigmoidoscopy today    Dispo: continue ICU care; plan for transfer to LTAC once stable.    Sepsis  As above        VTE Risk Mitigation (From admission, onward)        Ordered     IP VTE LOW RISK PATIENT  Once      05/08/19 1315     Place sequential compression device  Until discontinued      04/20/19 7767          Mook Ferguson MD  Urology  [facility]

## 2019-05-22 LAB
ALBUMIN SERPL BCP-MCNC: 1.5 G/DL (ref 3.5–5.2)
ALP SERPL-CCNC: 116 U/L (ref 55–135)
ALT SERPL W/O P-5'-P-CCNC: 8 U/L (ref 10–44)
ANION GAP SERPL CALC-SCNC: 8 MMOL/L (ref 8–16)
AST SERPL-CCNC: 23 U/L (ref 10–40)
BACTERIA SPEC AEROBE CULT: NO GROWTH
BACTERIA UR CULT: NORMAL
BASOPHILS # BLD AUTO: 0.05 K/UL (ref 0–0.2)
BASOPHILS # BLD AUTO: 0.08 K/UL (ref 0–0.2)
BASOPHILS NFR BLD: 0.3 % (ref 0–1.9)
BASOPHILS NFR BLD: 0.4 % (ref 0–1.9)
BILIRUB SERPL-MCNC: 0.6 MG/DL (ref 0.1–1)
BUN SERPL-MCNC: 32 MG/DL (ref 6–20)
CALCIUM SERPL-MCNC: 8.6 MG/DL (ref 8.7–10.5)
CHLORIDE SERPL-SCNC: 112 MMOL/L (ref 95–110)
CO2 SERPL-SCNC: 22 MMOL/L (ref 23–29)
CREAT SERPL-MCNC: 0.7 MG/DL (ref 0.5–1.4)
DIFFERENTIAL METHOD: ABNORMAL
DIFFERENTIAL METHOD: ABNORMAL
EOSINOPHIL # BLD AUTO: 1.1 K/UL (ref 0–0.5)
EOSINOPHIL # BLD AUTO: 1.2 K/UL (ref 0–0.5)
EOSINOPHIL NFR BLD: 5.7 % (ref 0–8)
EOSINOPHIL NFR BLD: 7.7 % (ref 0–8)
ERYTHROCYTE [DISTWIDTH] IN BLOOD BY AUTOMATED COUNT: 14.6 % (ref 11.5–14.5)
ERYTHROCYTE [DISTWIDTH] IN BLOOD BY AUTOMATED COUNT: 14.9 % (ref 11.5–14.5)
EST. GFR  (AFRICAN AMERICAN): >60 ML/MIN/1.73 M^2
EST. GFR  (NON AFRICAN AMERICAN): >60 ML/MIN/1.73 M^2
GLUCOSE SERPL-MCNC: 112 MG/DL (ref 70–110)
HCT VFR BLD AUTO: 24.5 % (ref 37–48.5)
HCT VFR BLD AUTO: 27 % (ref 37–48.5)
HGB BLD-MCNC: 7.6 G/DL (ref 12–16)
HGB BLD-MCNC: 8.4 G/DL (ref 12–16)
IMM GRANULOCYTES # BLD AUTO: 0.11 K/UL (ref 0–0.04)
IMM GRANULOCYTES # BLD AUTO: 0.13 K/UL (ref 0–0.04)
IMM GRANULOCYTES NFR BLD AUTO: 0.7 % (ref 0–0.5)
IMM GRANULOCYTES NFR BLD AUTO: 0.7 % (ref 0–0.5)
INR PPP: 1 (ref 0.8–1.2)
LYMPHOCYTES # BLD AUTO: 1.9 K/UL (ref 1–4.8)
LYMPHOCYTES # BLD AUTO: 2.1 K/UL (ref 1–4.8)
LYMPHOCYTES NFR BLD: 10.5 % (ref 18–48)
LYMPHOCYTES NFR BLD: 12.7 % (ref 18–48)
MAGNESIUM SERPL-MCNC: 1.8 MG/DL (ref 1.6–2.6)
MCH RBC QN AUTO: 28.7 PG (ref 27–31)
MCH RBC QN AUTO: 28.7 PG (ref 27–31)
MCHC RBC AUTO-ENTMCNC: 31 G/DL (ref 32–36)
MCHC RBC AUTO-ENTMCNC: 31.1 G/DL (ref 32–36)
MCV RBC AUTO: 92 FL (ref 82–98)
MCV RBC AUTO: 93 FL (ref 82–98)
MONOCYTES # BLD AUTO: 1.4 K/UL (ref 0.3–1)
MONOCYTES # BLD AUTO: 1.4 K/UL (ref 0.3–1)
MONOCYTES NFR BLD: 7.5 % (ref 4–15)
MONOCYTES NFR BLD: 8.8 % (ref 4–15)
NEUTROPHILS # BLD AUTO: 11.2 K/UL (ref 1.8–7.7)
NEUTROPHILS # BLD AUTO: 13.7 K/UL (ref 1.8–7.7)
NEUTROPHILS NFR BLD: 69.8 % (ref 38–73)
NEUTROPHILS NFR BLD: 75.2 % (ref 38–73)
NRBC BLD-RTO: 0 /100 WBC
NRBC BLD-RTO: 0 /100 WBC
PHOSPHATE SERPL-MCNC: 2.5 MG/DL (ref 2.7–4.5)
PLATELET # BLD AUTO: 254 K/UL (ref 150–350)
PLATELET # BLD AUTO: 267 K/UL (ref 150–350)
PMV BLD AUTO: 10.8 FL (ref 9.2–12.9)
PMV BLD AUTO: 11.2 FL (ref 9.2–12.9)
POCT GLUCOSE: 132 MG/DL (ref 70–110)
POCT GLUCOSE: 133 MG/DL (ref 70–110)
POCT GLUCOSE: 135 MG/DL (ref 70–110)
POCT GLUCOSE: 136 MG/DL (ref 70–110)
POCT GLUCOSE: 138 MG/DL (ref 70–110)
POCT GLUCOSE: 142 MG/DL (ref 70–110)
POTASSIUM SERPL-SCNC: 4.1 MMOL/L (ref 3.5–5.1)
PROT SERPL-MCNC: 5.8 G/DL (ref 6–8.4)
PROTHROMBIN TIME: 10.6 SEC (ref 9–12.5)
RBC # BLD AUTO: 2.65 M/UL (ref 4–5.4)
RBC # BLD AUTO: 2.93 M/UL (ref 4–5.4)
SODIUM SERPL-SCNC: 142 MMOL/L (ref 136–145)
WBC # BLD AUTO: 16.09 K/UL (ref 3.9–12.7)
WBC # BLD AUTO: 18.3 K/UL (ref 3.9–12.7)

## 2019-05-22 PROCEDURE — 84100 ASSAY OF PHOSPHORUS: CPT

## 2019-05-22 PROCEDURE — 80053 COMPREHEN METABOLIC PANEL: CPT

## 2019-05-22 PROCEDURE — 99233 SBSQ HOSP IP/OBS HIGH 50: CPT | Mod: ,,, | Performed by: SURGERY

## 2019-05-22 PROCEDURE — 27000221 HC OXYGEN, UP TO 24 HOURS

## 2019-05-22 PROCEDURE — 99232 SBSQ HOSP IP/OBS MODERATE 35: CPT | Mod: GC,,, | Performed by: INTERNAL MEDICINE

## 2019-05-22 PROCEDURE — 94003 VENT MGMT INPAT SUBQ DAY: CPT

## 2019-05-22 PROCEDURE — 85610 PROTHROMBIN TIME: CPT

## 2019-05-22 PROCEDURE — 94761 N-INVAS EAR/PLS OXIMETRY MLT: CPT

## 2019-05-22 PROCEDURE — 25000003 PHARM REV CODE 250: Performed by: STUDENT IN AN ORGANIZED HEALTH CARE EDUCATION/TRAINING PROGRAM

## 2019-05-22 PROCEDURE — 99233 SBSQ HOSP IP/OBS HIGH 50: CPT | Mod: ,,, | Performed by: PHYSICIAN ASSISTANT

## 2019-05-22 PROCEDURE — 85025 COMPLETE CBC W/AUTO DIFF WBC: CPT

## 2019-05-22 PROCEDURE — 83735 ASSAY OF MAGNESIUM: CPT

## 2019-05-22 PROCEDURE — 36415 COLL VENOUS BLD VENIPUNCTURE: CPT

## 2019-05-22 PROCEDURE — 97530 THERAPEUTIC ACTIVITIES: CPT

## 2019-05-22 PROCEDURE — 99900026 HC AIRWAY MAINTENANCE (STAT)

## 2019-05-22 PROCEDURE — 27100171 HC OXYGEN HIGH FLOW UP TO 24 HOURS

## 2019-05-22 PROCEDURE — 99900035 HC TECH TIME PER 15 MIN (STAT)

## 2019-05-22 PROCEDURE — 63600175 PHARM REV CODE 636 W HCPCS: Performed by: STUDENT IN AN ORGANIZED HEALTH CARE EDUCATION/TRAINING PROGRAM

## 2019-05-22 PROCEDURE — 27100092 HC HIGH FLOW DELIVERY CANNULA

## 2019-05-22 PROCEDURE — 99233 PR SUBSEQUENT HOSPITAL CARE,LEVL III: ICD-10-PCS | Mod: ,,, | Performed by: PHYSICIAN ASSISTANT

## 2019-05-22 PROCEDURE — 97110 THERAPEUTIC EXERCISES: CPT

## 2019-05-22 PROCEDURE — 20000000 HC ICU ROOM

## 2019-05-22 PROCEDURE — 99233 PR SUBSEQUENT HOSPITAL CARE,LEVL III: ICD-10-PCS | Mod: ,,, | Performed by: SURGERY

## 2019-05-22 PROCEDURE — 97535 SELF CARE MNGMENT TRAINING: CPT

## 2019-05-22 PROCEDURE — 99232 PR SUBSEQUENT HOSPITAL CARE,LEVL II: ICD-10-PCS | Mod: GC,,, | Performed by: INTERNAL MEDICINE

## 2019-05-22 PROCEDURE — 25500020 PHARM REV CODE 255: Performed by: UROLOGY

## 2019-05-22 RX ORDER — FLUCONAZOLE 40 MG/ML
800 POWDER, FOR SUSPENSION ORAL DAILY
Status: DISCONTINUED | OUTPATIENT
Start: 2019-05-23 | End: 2019-05-22

## 2019-05-22 RX ORDER — INSULIN ASPART 100 [IU]/ML
4 INJECTION, SOLUTION INTRAVENOUS; SUBCUTANEOUS
Status: DISCONTINUED | OUTPATIENT
Start: 2019-05-23 | End: 2019-05-23

## 2019-05-22 RX ORDER — OXYCODONE HYDROCHLORIDE 5 MG/1
5 TABLET ORAL EVERY 6 HOURS PRN
Status: DISCONTINUED | OUTPATIENT
Start: 2019-05-22 | End: 2019-06-05 | Stop reason: HOSPADM

## 2019-05-22 RX ORDER — FUROSEMIDE 10 MG/ML
40 INJECTION INTRAMUSCULAR; INTRAVENOUS ONCE
Status: COMPLETED | OUTPATIENT
Start: 2019-05-22 | End: 2019-05-22

## 2019-05-22 RX ORDER — FUROSEMIDE 10 MG/ML
60 INJECTION INTRAMUSCULAR; INTRAVENOUS ONCE
Status: DISCONTINUED | OUTPATIENT
Start: 2019-05-22 | End: 2019-05-22

## 2019-05-22 RX ORDER — FUROSEMIDE 10 MG/ML
60 INJECTION INTRAMUSCULAR; INTRAVENOUS ONCE
Status: COMPLETED | OUTPATIENT
Start: 2019-05-22 | End: 2019-05-22

## 2019-05-22 RX ORDER — INSULIN ASPART 100 [IU]/ML
4 INJECTION, SOLUTION INTRAVENOUS; SUBCUTANEOUS
Status: DISCONTINUED | OUTPATIENT
Start: 2019-05-22 | End: 2019-05-23

## 2019-05-22 RX ORDER — FLUCONAZOLE 200 MG/1
800 TABLET ORAL DAILY
Status: DISPENSED | OUTPATIENT
Start: 2019-05-23 | End: 2019-05-30

## 2019-05-22 RX ADMIN — CHLORHEXIDINE GLUCONATE 0.12% ORAL RINSE 15 ML: 1.2 LIQUID ORAL at 08:05

## 2019-05-22 RX ADMIN — INSULIN ASPART 5 UNITS: 100 INJECTION, SOLUTION INTRAVENOUS; SUBCUTANEOUS at 12:05

## 2019-05-22 RX ADMIN — OXYCODONE HYDROCHLORIDE 5 MG: 5 TABLET ORAL at 12:05

## 2019-05-22 RX ADMIN — INSULIN ASPART 4 UNITS: 100 INJECTION, SOLUTION INTRAVENOUS; SUBCUTANEOUS at 04:05

## 2019-05-22 RX ADMIN — HYDROMORPHONE HYDROCHLORIDE 1 MG: 1 INJECTION, SOLUTION INTRAMUSCULAR; INTRAVENOUS; SUBCUTANEOUS at 01:05

## 2019-05-22 RX ADMIN — LABETALOL HCL IV SOLN PREFILLED SYRINGE 20 MG/4ML (5 MG/ML) 10 MG: 20/4 SOLUTION PREFILLED SYRINGE at 12:05

## 2019-05-22 RX ADMIN — FLUCONAZOLE 200 MG: 40 POWDER, FOR SUSPENSION ORAL at 08:05

## 2019-05-22 RX ADMIN — SILODOSIN 4 MG: 4 CAPSULE ORAL at 08:05

## 2019-05-22 RX ADMIN — INSULIN ASPART 5 UNITS: 100 INJECTION, SOLUTION INTRAVENOUS; SUBCUTANEOUS at 01:05

## 2019-05-22 RX ADMIN — INSULIN ASPART 5 UNITS: 100 INJECTION, SOLUTION INTRAVENOUS; SUBCUTANEOUS at 05:05

## 2019-05-22 RX ADMIN — MICONAZOLE NITRATE: 20 OINTMENT TOPICAL at 08:05

## 2019-05-22 RX ADMIN — INSULIN ASPART 5 UNITS: 100 INJECTION, SOLUTION INTRAVENOUS; SUBCUTANEOUS at 08:05

## 2019-05-22 RX ADMIN — FUROSEMIDE 40 MG: 10 INJECTION, SOLUTION INTRAMUSCULAR; INTRAVENOUS at 01:05

## 2019-05-22 RX ADMIN — HYDROMORPHONE HYDROCHLORIDE 1 MG: 1 INJECTION, SOLUTION INTRAMUSCULAR; INTRAVENOUS; SUBCUTANEOUS at 07:05

## 2019-05-22 RX ADMIN — PANTOPRAZOLE SODIUM 40 MG: 40 GRANULE, DELAYED RELEASE ORAL at 08:05

## 2019-05-22 RX ADMIN — Medication 2 MG: at 12:05

## 2019-05-22 RX ADMIN — IOHEXOL 100 ML: 350 INJECTION, SOLUTION INTRAVENOUS at 10:05

## 2019-05-22 RX ADMIN — Medication 2 MG: at 08:05

## 2019-05-22 RX ADMIN — FUROSEMIDE 60 MG: 10 INJECTION, SOLUTION INTRAMUSCULAR; INTRAVENOUS at 06:05

## 2019-05-22 RX ADMIN — ACETAMINOPHEN 650 MG: 325 TABLET ORAL at 03:05

## 2019-05-22 RX ADMIN — CEFEPIME 2 G: 2 INJECTION, POWDER, FOR SOLUTION INTRAVENOUS at 08:05

## 2019-05-22 RX ADMIN — RIFAMPIN 300 MG: 600 INJECTION, POWDER, LYOPHILIZED, FOR SOLUTION INTRAVENOUS at 03:05

## 2019-05-22 RX ADMIN — RIFAMPIN 300 MG: 600 INJECTION, POWDER, LYOPHILIZED, FOR SOLUTION INTRAVENOUS at 02:05

## 2019-05-22 RX ADMIN — CEFEPIME 2 G: 2 INJECTION, POWDER, FOR SOLUTION INTRAVENOUS at 03:05

## 2019-05-22 RX ADMIN — CEFEPIME 2 G: 2 INJECTION, POWDER, FOR SOLUTION INTRAVENOUS at 12:05

## 2019-05-22 NOTE — ASSESSMENT & PLAN NOTE
Ms. Huff is a 60 yo F with PMH of HTN, DM II, CAD, NSTEMI, s/p L5-S1 OLIF with NSGY 4/12 c/b intraoperative left ureteral injury and underwent ureteroureteral anastomoses and ureteral stent placement complicated by sepsis due to E.coli septicemia now s/p ex lap on 4/21 and PEG/Trach of the vent now having BRBPR.     S/p flex sig on 5/13, 5/21  Given non-bleeding appearance of ulcer, reasonable to r/o other sources with EGD/full colonoscopy  Transfuse as needed, cont to trend H/H  cont to hold hep gtt  Please call with any questions or concerns

## 2019-05-22 NOTE — PLAN OF CARE
05/22/19 1628   Post-Acute Status   Post-Acute Authorization Placement   Post-Acute Placement Status Pending Payor Review     NICK was informed by the CM director that more than 1 choice needs to be obtained for LTAC for the pt.  Billie with [facility] has requested auth for the pt.  NICK was informed by   that more than 1 choice needs to be obtained for placement even though the pt has been accepted to [facility] and a request has been submitted for insurance approval by Billie from [facility].  NICK met with the pt and her  to update them and request additional choices.  They want [facility] as their 1st choice.  They do not want to go to [facility] or [facility] but are agreeable to referrals being sent to these facilities.  SW sent referrals to them.  There were only 3 options on the insurance list.  NICK will f/u as needed.    Ирина Godinez, FRED x [phone]

## 2019-05-22 NOTE — PLAN OF CARE
Problem: Physical Therapy Goal  Goal: Physical Therapy Goal  Goals to be met by: 19    Patient will increase functional independence with mobility by performin. Supine to sit with Moderate Assistance -not met  2. Sit to stand transfer with Minimal Assistance -not met  3. Gait  x 30 feet with Contact Guard Assistance using Rolling Walker. -not met  4. Lower extremity exercise program x15 reps , with assistance as needed- met       Goals remain appropriate. Cathy Taylor, PT 2019

## 2019-05-22 NOTE — SUBJECTIVE & OBJECTIVE
Interval History: No acute events. Afebrile overnight. Patient breathing comfortably on spontaneous mode on vent. Off Vanc. WBC downtrending 20 -> 16, on cefepime, rifampin, and diflucan. UOP adequate. Cr stable.    Review of Systems  Objective:     Temp:  [99.3 °F (37.4 °C)-99.8 °F (37.7 °C)] 99.6 °F (37.6 °C)  Pulse:  [78-97] 91  Resp:  [19-30] 28  SpO2:  [97 %-100 %] 100 %  BP: (131-157)/(55-85) 157/55     Body mass index is 27.88 kg/m².    Date 05/22/19 0700 - 05/23/19 0659   Shift 8467-4025 2653-5282 0909-3101 24 Hour Total   INTAKE   NG/GT 40   40   Shift Total(mL/kg) 40(0.5)   40(0.5)   OUTPUT   Urine(mL/kg/hr) 60   60   Shift Total(mL/kg) 60(0.8)   60(0.8)   Weight (kg) 76 76 76 76     Bladder Scan Volume (mL): 27 mL (05/10/19 0846)  Post Void Cath Residual Output (mL): 27 mL (05/10/19 0846)    Drains     Drain                 Nephrostomy 04/21/19 0629 Left 8 Fr. 31 days         Gastrostomy/Enterostomy 05/02/19 1134 Percutaneous endoscopic gastrostomy (PEG) LUQ decompression;feeding 19 days         Urethral Catheter 05/16/19 1040 16 Fr. 5 days                Physical Exam   Constitutional: She appears well-developed. No distress.   HENT:   Head: Normocephalic and atraumatic.   Neck: No JVD present.   Cardiovascular: Normal rate and regular rhythm.    Pulmonary/Chest: Effort normal. No respiratory distress.   On vent   Abdominal: Soft. She exhibits no distension. There is no rebound and no guarding.   Incision c/d/i   G-tube   Genitourinary:   Genitourinary Comments: Fontenot in place draining clear yellow urine  Left neph tube with clear yellow urine   Neurological: She is alert.   Skin: Skin is warm and dry. She is not diaphoretic. No pallor.         Significant Labs:    BMP:  Recent Labs   Lab 05/20/19  1118 05/21/19  0339 05/22/19  0450    140 142   K 5.1 4.3 4.1   * 111* 112*   CO2 22* 20* 22*   BUN 45* 39* 32*   CREATININE 1.0 0.8 0.7   CALCIUM 8.3* 8.7 8.6*       CBC:   Recent Labs   Lab  05/20/19  1718 05/21/19  0339 05/22/19  0650   WBC 22.62* 20.27* 16.09*   HGB 8.3* 7.6* 7.6*   HCT 25.5* 23.9* 24.5*    226 254     Significant Imaging:  All pertinent imaging results/findings from the past 24 hours have been reviewed.

## 2019-05-22 NOTE — PROGRESS NOTES
Therapy with vancomycin has been discontinued by the provider.  Pharmacy will sign off, please re-consult as needed.    Marilou Jones, PharmD, BCCCP  [phone]

## 2019-05-22 NOTE — PROGRESS NOTES
[facility]  Colorectal Surgery  Progress Note    Patient Name: Mariann Huff  MRN: [id number]  Admission Date: 4/20/2019  Hospital Length of Stay: 32 days  Attending Physician: Robin Boyd MD    Subjective:     Interval History: NAEO, no bleeding since flex, having brown stool w/ no blood    Post-Op Info:  Procedure(s) (LRB):  SIGMOIDOSCOPY, FLEXIBLE (N/A)   1 Day Post-Op      Medications:  Continuous Infusions:  Scheduled Meds:   ceFEPime (MAXIPIME) IVPB  2 g Intravenous Q8H    chlorhexidine  15 mL Mouth/Throat BID    fluconazole 40 mg/ml  200 mg Per G Tube Daily    insulin aspart U-100  5 Units Subcutaneous Q24H    insulin aspart U-100  5 Units Subcutaneous Q24H    insulin aspart U-100  5 Units Subcutaneous Q24H    insulin aspart U-100  5 Units Subcutaneous Q24H    insulin aspart U-100  5 Units Subcutaneous Q24H    insulin aspart U-100  5 Units Subcutaneous Q24H    loperamide  2 mg Per G Tube QID    miconazole nitrate 2%   Topical (Top) BID    pantoprazole  40 mg Per G Tube Daily    rifAMpin (RIFADIN) IVPB  300 mg Intravenous Q12H    silodosin  4 mg Per G Tube Daily     PRN Meds:   acetaminophen    albuterol-ipratropium    dextrose 50%    glucagon (human recombinant)    hydrALAZINE    HYDROmorphone    hydrOXYzine    insulin aspart U-100    labetalol    magnesium sulfate IVPB    magnesium sulfate IVPB    ondansetron    oxyCODONE    promethazine (PHENERGAN) IVPB    sodium chloride 0.9%        Objective:     Vital Signs (Most Recent):  Temp: 99.6 °F (37.6 °C) (05/22/19 1100)  Pulse: 104 (05/22/19 1430)  Resp: (!) 28 (05/21/19 1500)  BP: (!) 164/70 (05/22/19 1430)  SpO2: 100 % (05/22/19 1430) Vital Signs (24h Range):  Temp:  [99.3 °F (37.4 °C)-99.8 °F (37.7 °C)] 99.6 °F (37.6 °C)  Pulse:  [] 104  Resp:  [28] 28  SpO2:  [93 %-100 %] 100 %  BP: ()/(55-94) 164/70     Intake/Output - Last 3 Shifts       05/20 0700 - 05/21 0659 05/21 0700 - 05/22 0659 05/22  0700 - 05/23 0659    I.V. (mL/kg) 656 (8.6) 145 (1.9)     Blood 350      NG/ 2020 680    IV Piggyback  1000     Total Intake(mL/kg) 1946 (25.6) 3165 (41.6) 680 (8.9)    Urine (mL/kg/hr) 1345 (0.7) 1215 (0.7) 565 (0.9)    Stool 0 0 0    Total Output 1345 1215 565    Net +601 +1950 +115           Urine Occurrence  1 x     Stool Occurrence 4 x 3 x 2 x          Physical Exam   Constitutional: No distress.   Eyes: EOM are normal.   Neck: Neck supple.   Cardiovascular: Normal rate and regular rhythm.   Pulmonary/Chest: Effort normal. No respiratory distress.   Abdominal: Soft. She exhibits no distension. There is no tenderness.   Musculoskeletal: Normal range of motion.   Neurological: She is alert.   Skin: Skin is warm and dry.       Significant Labs:  BMP (Last 3 Results):   Recent Labs   Lab 05/20/19  1118 05/21/19  0339 05/22/19  0450   * 197* 112*    140 142   K 5.1 4.3 4.1   * 111* 112*   CO2 22* 20* 22*   BUN 45* 39* 32*   CREATININE 1.0 0.8 0.7   CALCIUM 8.3* 8.7 8.6*   MG 1.8 2.0 1.8     CBC (Last 3 Results):   Recent Labs   Lab 05/20/19  1718 05/21/19  0339 05/22/19  0650   WBC 22.62* 20.27* 16.09*   RBC 2.82* 2.64* 2.65*   HGB 8.3* 7.6* 7.6*   HCT 25.5* 23.9* 24.5*    226 254   MCV 90 91 93   MCH 29.4 28.8 28.7   MCHC 32.5 31.8* 31.0*       Significant Diagnostics:  None    Assessment/Plan:     BRBPR (bright red blood per rectum)  Ms. Huff is a 60 yo F with PMH of HTN, DM II, CAD, NSTEMI, s/p L5-S1 OLIF with NSGY 4/12 c/b intraoperative left ureteral injury and underwent ureteroureteral anastomoses and ureteral stent placement complicated by sepsis due to E.coli septicemia now s/p ex lap on 4/21 and PEG/Trach of the vent now having BRBPR.     S/p flex sig on 5/13, 5/21  Given non-bleeding appearance of ulcer, reasonable to r/o other sources with EGD/full colonoscopy  Transfuse as needed, cont to trend H/H  cont to hold hep gtt  Please call with any questions or concerns                John Barker MD  Colorectal Surgery  [facility]

## 2019-05-22 NOTE — PT/OT/SLP PROGRESS
Occupational Therapy   Treatment    Name: Mariann Huff  MRN: [id number]  Admitting Diagnosis:  Ureteral transection of left native kidney      Recommendations:     Discharge Recommendations: rehabilitation facility  Discharge Equipment Recommendations:  (TBD closer to d /c)  Barriers to discharge:  Decreased caregiver support    Assessment:     Mariann Huff is a 59 y.o. female with a medical diagnosis of Ureteral transection of left native kidney.  She presents with increased activity tolerance. Performance deficits affecting function are impaired self care skills, weakness, impaired balance, impaired functional mobilty, gait instability, impaired endurance, impaired cardiopulmonary response to activity, pain.     Rehab Prognosis:  Good; patient would benefit from acute skilled OT services to address these deficits and reach maximum level of function.       Plan:     Patient to be seen 4 x/week to address the above listed problems via self-care/home management, therapeutic activities, therapeutic exercises  · Plan of Care Expires: 06/05/19  · Plan of Care Reviewed with: patient    Subjective     Pain/Comfort:  · Pain Rating 1: 5/10  · Location - Side 1: Bilateral  · Location - Orientation 1: generalized  · Location 1: back  · Pain Addressed 1: Reposition, Distraction  · Pain Rating Post-Intervention 1: 5/10    Objective:     Communicated with: RN prior to session.  Patient found supine with blood pressure cuff, telemetry, tracheostomy, ventilator, pulse ox (continuous), correa catheter, PEG Tube, peripheral IV upon OT entry to room.    General Precautions: Standard, fall   Orthopedic Precautions:N/A   Braces: LSO     Occupational Performance:     Bed Mobility:    · Patient completed Rolling/Turning to Left with  minimum assistance with side rail  · Patient completed Rolling/Turning to Right with moderate assistance with side rail  · Patient completed Scooting/Bridging with moderate assistance to EOB  · Patient  completed Supine to Sit with total assistance  · Patient completed Sit to Supine with total assistance     Functional Mobility/Transfers:  · Patient completed Sit <> Stand Transfer with total assistance (max A x 2)  with  no assistive device  with ~50% buttocks clearance x 2 trials  · Functional Mobility: NT    Activities of Daily Living:  · Grooming: minimal assistance for washing face  seated EOB  · Toileting: total assistance for pericare in standing and supine      AMPA 6 Click ADL: 9    Treatment & Education:  Pt ed on OT POC  Pt sat EOB x 10 min while engaged in self-care and UE AAROM Ex's  Pt initially sat with SBA regressing to max A 2* posterior lean with fatigue    Patient left supine with all lines intact, call button in reach and RN notifiedEducation:      GOALS:   Multidisciplinary Problems     Occupational Therapy Goals        Problem: Occupational Therapy Goal    Goal Priority Disciplines Outcome Interventions   Occupational Therapy Goal     OT, PT/OT Ongoing (interventions implemented as appropriate)    Description:  Goals to be met by: 6/5/19     Patient will increase functional independence with ADLs by performing:    UE Dressing with Set-up Assistance.  LE Dressing with Maximum Assistance and Assistive Devices as needed.  Grooming while standing with Minimal Assistance.  Toileting from bedside commode with Minimal Assistance for hygiene and clothing management.   Sitting at edge of bed x10 minutes with Supervision.  Rolling to Bilateral with Minimal Assistance.   Supine to sit with Minimal Assistance.  Toilet transfer to bedside commode with Minimal Assistance.                       Time Tracking:     OT Date of Treatment: 05/22/19  OT Start Time: 0741  OT Stop Time: 0810  OT Total Time (min): 29 min    Billable Minutes:Self Care/Home Management 23    PRIMITIVO Dash  5/22/2019

## 2019-05-22 NOTE — SUBJECTIVE & OBJECTIVE
Interval History: No AEON.  Tmax 100.7, T current  WNL.  Had flex sig this am.   The patient denies any recent fever, chills, or sweats.  SOB improving      Review of Systems   Constitutional: Negative for activity change, chills, diaphoresis and fever.   Respiratory: Negative for cough, shortness of breath and wheezing.    Cardiovascular: Negative for chest pain.   Gastrointestinal: Negative for abdominal pain, constipation, diarrhea, nausea and vomiting.   Genitourinary: Negative for dysuria, frequency and urgency.   Neurological: Negative for dizziness.   Hematological: Does not bruise/bleed easily.     Objective:     Vital Signs (Most Recent):  Temp: 99.7 °F (37.6 °C) (05/21/19 1900)  Pulse: 94 (05/21/19 2145)  Resp: (!) 28 (05/21/19 1500)  BP: (!) 155/69 (05/21/19 1800)  SpO2: 98 % (05/21/19 2145) Vital Signs (24h Range):  Temp:  [98.5 °F (36.9 °C)-99.8 °F (37.7 °C)] 99.7 °F (37.6 °C)  Pulse:  [] 94  Resp:  [15-46] 28  SpO2:  [87 %-100 %] 98 %  BP: ()/(25-85) 155/69     Weight: 76 kg (167 lb 8.8 oz)  Body mass index is 27.88 kg/m².    Estimated Creatinine Clearance: 78.2 mL/min (based on SCr of 0.8 mg/dL).    Physical Exam   Constitutional: She is oriented to person, place, and time. She appears well-developed and well-nourished. No distress.       HENT:   Head: Normocephalic and atraumatic.   Cardiovascular: Normal rate, regular rhythm and normal heart sounds. Exam reveals no gallop and no friction rub.   No murmur heard.  Pulmonary/Chest: Effort normal and breath sounds normal. No stridor. No respiratory distress. She has no wheezes. She has no rales.   Abdominal: Soft. Bowel sounds are normal. She exhibits no distension and no mass. There is no tenderness. There is no rebound and no guarding.   Musculoskeletal: She exhibits no edema.   Neurological: She is alert and oriented to person, place, and time.   Skin: Skin is warm and dry. She is not diaphoretic.   Psychiatric: She has a normal mood and  affect. Her behavior is normal.       Significant Labs:   Blood Culture:   Recent Labs   Lab 05/10/19  1015 05/10/19  1020 05/19/19  1843 05/20/19  1300 05/20/19  1306   LABBLOO No growth after 5 days. No growth after 5 days. No Growth to date  No Growth to date  No Growth to date No Growth to date  No Growth to date No Growth to date  No Growth to date     CBC:   Recent Labs   Lab 05/20/19  1118 05/20/19  1718 05/21/19  0339   WBC 21.75* 22.62* 20.27*   HGB 6.7* 8.3* 7.6*   HCT 20.7* 25.5* 23.9*    215 226     CMP:   Recent Labs   Lab 05/20/19  0358 05/20/19  1118 05/21/19  0339    141 140   K 3.7 5.1 4.3   * 114* 111*   CO2 20* 22* 20*   * 245* 197*   BUN 43* 45* 39*   CREATININE 1.0 1.0 0.8   CALCIUM 8.5* 8.3* 8.7   PROT 5.8*  --  5.5*   ALBUMIN 1.6* 1.4* 1.4*   BILITOT 0.8  --  0.6   ALKPHOS 122  --  102   AST 24  --  22   ALT 6*  --  <5*   ANIONGAP 9 5* 9   EGFRNONAA >60.0 >60.0 >60.0     Respiratory Culture:   Recent Labs   Lab 04/30/19  0900 05/08/19  1300 05/19/19  1130   GSRESP <10 epithelial cells per low power field.  No WBC's  No organisms seen Rare WBC's  No organisms seen Few WBC's  Few Gram negative rods   RESPIRATORYC  --  No growth PSEUDOMONAS AERUGINOSA  Many       Urine Culture:   Recent Labs   Lab 04/21/19  0640 04/21/19  2256 04/25/19  0252 05/13/19  1846 05/19/19  1154   LABURIN No growth No significant growth No growth CANDIDA ALBICANS  > 100,000 cfu/ml  Treatment of asymptomatic candiduria is not recommended (except for   specific populations). Candida isolated in the urine typically   represents colonization. If an indwelling urinary catheter is present  it should be removed or replaced.  Susceptibility testing not routinely performed   YEAST   > 100,000 cfu/ml  Identification pending       Urine Studies:   Recent Labs   Lab 05/19/19  1154   COLORU Argelia   APPEARANCEUA Cloudy*   PHUR 5.0   SPECGRAV 1.020   PROTEINUA 1+*   GLUCUA Negative   KETONESU Negative    BILIRUBINUA Negative   OCCULTUA 1+*   NITRITE Negative   LEUKOCYTESUR 1+*   RBCUA 3   WBCUA 20*   BACTERIA Many*   SQUAMEPITHEL 1   HYALINECASTS 0     Wound Culture:   Recent Labs   Lab 04/21/19  0125 05/19/19  1241   LABAERO STAPHYLOCOCCUS LUGDUNENSIS  Rare   No growth     All pertinent labs within the past 24 hours have been reviewed.    Significant Imaging: I have reviewed all pertinent imaging results/findings within the past 24 hours.   X-Ray Chest AP Portable [[id number]] Resulted: 05/21/19 1055   Order Status: Completed Updated: 05/21/19 1057   Narrative:     EXAMINATION:  XR CHEST AP PORTABLE    CLINICAL HISTORY:  PNA;    COMPARISON:  Comparison is made to 05/21/2019 at 06:02.    FINDINGS:  Tracheostomy cannula again seen.  Cardiomediastinal silhouette is magnified both by projection and by a poor inspiratory depth level.  I doubt that the true cardiac size is any larger than upper limit of normal allowing for these technical factors, and the cardiomediastinal silhouette has not changed appreciably since the examination referenced above.  Pulmonary vascularity in the upper lung zones is minimally prominent, as before.  Lung zones appear stable, with some patchy airspace consolidation in the left mid/lower lung zones again seen but with no significant new areas of airspace consolidation or volume loss evident.  Some of the opacity in the inferior hemothorax on the left side may reflect pleural fluid.  No definite pleural fluid on the right.  No pneumothorax.   Impression:       Allowing for an even poorer inspiratory depth level on the current exam, there has been no significant interval change in the appearance of the chest since 05/21/2019 at 06:02.      Electronically signed by: Christian Moreno MD  Date: 05/21/2019  Time: 10:55   X-Ray Chest AP Portable [[id number]] Resulted: 05/21/19 0800   Order Status: Completed Updated: 05/21/19 0802   Narrative:     EXAMINATION:  XR CHEST AP PORTABLE    CLINICAL  HISTORY:  PNA;    COMPARISON:  Comparison is made to 05/20/2019.    FINDINGS:  No significant interval change in the appearance of the chest since 05/20/2019 is appreciated, allowing for a poorer inspiratory depth level on the current examination.  No pneumothorax.   Impression:       As above      Electronically signed by: Christian Moreno MD  Date: 05/21/2019  Time: 08:00   X-Ray Chest AP Portable [[id number]] Resulted: 05/20/19 0717   Order Status: Completed Updated: 05/20/19 0719   Narrative:     EXAMINATION:  XR CHEST AP PORTABLE    CLINICAL HISTORY:  eval consolidation;    TECHNIQUE:  Single frontal view of the chest was performed.    COMPARISON:  May 19, 2019.    FINDINGS:  Tracheostomy cannula monitoring leads remain.  Heart size is similar.  Increase in the pulmonary vascular and perihilar alveolar markings though some improvement in the aeration in the peripheral lung fields.   there may be some pleural fluid on the left.  No pneumothorax.   Impression:       Findings most consistent with congestive heart failure.  Aeration is improved.      Electronically signed by: Mk Reilly MD  Date: 05/20/2019  Time: 07:17   X-Ray Chest AP Portable [[id number]] Resulted: 05/19/19 1513   Order Status: Completed Updated: 05/19/19 1516   Narrative:     EXAMINATION:  XR CHEST AP PORTABLE    CLINICAL HISTORY:  respiratory distress;    TECHNIQUE:  Single frontal view of the chest was performed.    COMPARISON:  05/19/2019 at 05:11    FINDINGS:  Tracheostomy tube in the clavicular heads.    Diffuse alveolar filling process unchanged.  No pneumothorax or pleural effusion.  The cardiomediastinal silhouette, osseous and soft tissue structures are stable.   Impression:       No significant interval change in what is likely diffuse alveolar pulmonary edema.  Infection can have a similar appearance.      Electronically signed by: Sharon Bro MD  Date: 05/19/2019  Time: 15:13   X-Ray Chest AP Portable [[id number]] Resulted: 05/19/19  0558   Order Status: Completed Updated: 05/19/19 0600   Narrative:     EXAMINATION:  XR CHEST AP PORTABLE    CLINICAL HISTORY:  trach;    TECHNIQUE:  Single frontal view of the chest was performed.    COMPARISON:  05/18/2019    FINDINGS:  Tracheostomy cannula projects in stable radiographic position.  Cardiomediastinal silhouette is unchanged.  The lungs demonstrate persistent diffuse increased interstitial attenuation and perihilar opacities, similar to prior examination.  No evidence of pneumothorax.   Impression:       No significant detrimental interval change compared to 05/18/2019.      Electronically signed by: Al Wu MD  Date: 05/19/2019  Time: 05:58   X-Ray Chest AP Portable [[id number]] Resulted: 05/18/19 0751   Order Status: Completed Updated: 05/18/19 0754   Narrative:     EXAMINATION:  XR CHEST AP PORTABLE    CLINICAL HISTORY:  trach;    TECHNIQUE:  Single frontal view of the chest was performed.    COMPARISON:  Chest AP portable dated 05/17/2019    FINDINGS:  Tracheostomy tube again noted.  Coarsened, central predominant interstitial attenuation with improved left perihilar infiltrate and new left basilar opacity.  Heart size is similar.  No pneumothorax or free air.   Impression:       As above      Electronically signed by: Tristan Flores  Date: 05/18/2019  Time: 07:51   X-Ray Chest AP Portable [[id number]] Resulted: 05/17/19 0903   Order Status: Completed Updated: 05/17/19 0905   Narrative:     EXAMINATION:  XR CHEST AP PORTABLE    CLINICAL HISTORY:  trach;    TECHNIQUE:  Single frontal view of the chest was performed.    COMPARISON:  No 05/16/2019 ne      Electronically signed by: Christian Valencia MD  Date: 05/17/2019  Time: 09:03   X-Ray Chest 1 View [[id number]] Resulted: 05/16/19 0747   Order Status: Completed Updated: 05/16/19 0750   Narrative:     EXAMINATION:  XR CHEST 1 VIEW    CLINICAL HISTORY:  mechanically assisted;    TECHNIQUE:  Single frontal view of the chest was  performed.    COMPARISON:  05/15/2019    FINDINGS:  Tracheostomy tube is present vascular catheters in place.  Heart size is unchanged.  There is little infiltrate present in the perihilar area on the left.  No pneumothorax.   Impression:       See above      Electronically signed by: Florin Cooley MD  Date: 05/16/2019  Time: 07:47   X-Ray Chest 1 View [[id number]] Resulted: 05/15/19 0726   Order Status: Completed Updated: 05/15/19 0729   Narrative:     EXAMINATION:  XR CHEST 1 VIEW    CLINICAL HISTORY:  mechanically assisted;    COMPARISON:  Comparison is made to 05/14/2019.    FINDINGS:  No significant interval change in the appearance of the chest since 05/14/2019 is appreciated, with the current examination demonstrating a slightly improved inspiratory depth level.  No pneumothorax.   Impression:       As above      Electronically signed by: Christian Moreno MD  Date: 05/15/2019  Time: 07:26

## 2019-05-22 NOTE — ASSESSMENT & PLAN NOTE
ASSESSMENT/PLAN:   Mariann Huff is a 59 y.o. female s/p left ureteral injury on 4/12, who presented with fever, AMS, and intraabdominal abscess, taken for washout and ligation of left ureter with nephrostomy tube placement on 4/21. S/p Trach/PEG on 5/2. Episode of negative pressure pulmonary edema on 5/8 requiring mechanical ventilation, diuresis and low dose pressor. Has been plagued by intermittent BRBPR.     Neuro:   - off sedation  - responds to questions appropriately by nodding  - no focal deficit     Pulmonary:   PAV+; try trach collar today  - CXR pending this AM  - ABGs prn  - diuresis as tolerates     Cardiac:   - alternating HTN and hypotension  - TTE 5/8 with no evidence of right heart strain or significant valvular pathology, normal LV systolic function, EF 70%     Renal:    - S/p transection of left ureter 4/12 and subsequent ligation and PCT nephrostomy tube placement with IR 4/21  - Fontenot removed 5/15, but then replaced for retention   - UO adequate  - BUN/Cr stable  - Monitor I/Os        Intake/Output Summary (Last 24 hours) at 5/22/2019 1209  Last data filed at 5/22/2019 1100  Gross per 24 hour   Intake 2135 ml   Output 1130 ml   Net 1005 ml        ID:   - AF  - EBC down  - Blood 5/10 NGTD  - Ucx 5/13 candiduria  - BAL 5/18 pseudomonas  - C diff 5/5 negative  - cefepime and rifampin (spine hardware)  - vanc stopped     Hem/Onc:   - stable  - check Hgb prn for bleeding     Endocrine:  - DM Type 2  - TF @ goal  - LDSSI  - Endocrine following for assistance with blood glucose control.      Fluids/Electrolytes/Nutrition/GI:   - Free water flushes 300 cc q6h  - Replace electrolytes PRN     # rectal ulcers  - intermittent hematochezia; ulcers seen to be improving on partial colonoscopy 5/21  - no bleeding in last 24 hours  - anoscopy and hemostatic agent applied by CRS 5/15  - imodium QID     PPx:  - DVT: Lovenox, Hep gtt held for continued bloody BMs        DISPO:  - Continue SICU care  - Preparing  for LTACH once bloody BMs are managed

## 2019-05-22 NOTE — ASSESSMENT & PLAN NOTE
60 y/o female with HTN, DMII, CAD, NSTEMI, s/p L5-S1 OLIF with NSGY 4/12 c/b intraoperative left ureteral injury and underwent ureteroureteral anastomoses and ureteral stent placement. She was discharged with a correa and MADHURI drain that was removed on 4/16. She was seen by urology and anticipated stent removal in 2-3 months (6/2019).  She presents to ED with AMS and E.coli septicemia found to have air and fluid collection of left anterior prevertebral soft tissue with left ureter involvement. She underwent ex-lap and washout on 4/21. We are consulted for abx recommendations.      Per op note,  There were pocket of purulence noted and evacuated, sent for culture of left retroperitoneum to pole of left kidney. The stent was visible. Posterior to the stent there was a pocket of purulent fluid and exposed hardware along the spinal vertebrae. The tissue along the distal and proximal end of ureter were friable and unhealthy. She underwent left nephrostomy tube placement. The stent was removed and several metal clips were placed on proximal end of the ureteral.      OR cultures with Staph Lugdenensis - not currently covered.  Patient does appear to be having diarrhea - C diff negative but suspect may need restesting if WBC increases and no other source found.  QTc long at 480      Repeat BCXs sent and NGTD.  BAL done but no WBC no organisms seen - NGTD.  Source of fever and leukocytosis unclear but ABD suspected though could be non infectious - drug reaction vs PE/DVT.  CT abdomen with superficial fluid collection - possible seroma and inflammatory change around ureter.  C diff repeated and negative.  Line changes jonathan cvc - removed.  RUE US shows partially occlusive thrombus in RIJ and proximal subclvian vein - suspected cause of fevers.  CXR clear last time seen by ID.    Again - She has developed fever and leukocytosis but appears not septic.  The patient denies any recent fever, chills, sweats, diarrhea, abdominal pain,  pain, or dysuria.  She is still bleeding from rectum per nurse. CXR with new L basilar consolidation w cf HCAP.  ABD drain site with pus - cultured - ? Superficial..       Stable non septic    There could be multiple causes to fever and leukocytosis in this patient:  1. ABD - Prior CT 5/1  There are postsurgical and inflammatory changes in the ventral midline abdominal wall with a focal fluid collection in the midline measuring 2.2 x 4.7 x 6.6 cm (AP x TV x CC).  There is mild body wall edema  ? Resolved  2. GI bleeding - flex sig with no bleeding  - up to splenic flexure viewed  3. PNA - endotracheal aspirated with pseudomonas sensitive to cefepime  Blood cultures NGTD  Stable and improving      Plan  - Rec DC vanc and blood cultures negative and no GPC growth on any culture  - continue cefepime fo HCAP x 5-7d  -Continue Rifampin.  - drain site pus sent for culture - ? superificial - ngtd  - colonoscopy and EGD planned  - Recommend working up other causes of white count as outline above. - Recommend CT abdomen/pelvis as had SQ fluid on last CT  - if clinically deteriorates would do Vanc and Meropenem given long hospital exposure and cf MDROs    -ID Will follow

## 2019-05-22 NOTE — PT/OT/SLP PROGRESS
Physical Therapy  Co-Treatment with OT  Patient Name:  Mariann Huff   MRN:  [id number]    Recommendations:     Discharge Recommendations:  rehabilitation facility   Discharge Equipment Recommendations: (will determine DME needs closer to discharge)   Barriers to discharge: Decreased caregiver support family will not be able to assist at current functional level.     Assessment:     Mariann Huff is a 59 y.o. female admitted with a medical diagnosis of Ureteral transection of left native kidney.  She presents with the following impairments/functional limitations:  weakness, gait instability, impaired balance, impaired endurance, impaired functional mobilty, decreased lower extremity function  Pt kenyetta treatment well being able to sit on EOB and being able to attempt standing. Pt will benefit from cont skilled PT 4x/wk to progress physically. Pt will need inpt rehab when medically stable to maximize rehab potential. Pt is s/p exp lap, ligation L ureter, 4/21, trach/PEG 5/2. Pt had back sx 4/12 previous admit.    Rehab Prognosis: Good; patient would benefit from acute skilled PT services to address these deficits and reach maximum level of function.    Recent Surgery: Procedure(s) (LRB):  SIGMOIDOSCOPY, FLEXIBLE (N/A) 1 Day Post-Op    Plan:     During this hospitalization, patient to be seen 4 x/week to address the identified rehab impairments via gait training, therapeutic activities, therapeutic exercises, neuromuscular re-education and progress toward the following goals:    · Plan of Care Expires:  06/03/19    Subjective     Chief Complaint: pt c/o pain in back and dizziness with sitting on EOB.   Patient/Family Comments/goals:  To get better and go home.   Pain/Comfort:  · Pain Rating 1: 5/10  · Location 1: (back)  · Pain Rating Post-Intervention 1: 5/10      Objective:     Communicated  with nurse prior to session.  Patient found supine with telemetry, blood pressure cuff, correa catheter, tracheostomy, ventilator, SCD, pulse ox (continuous)(t-tube) upon PT entry to room.     General Precautions: Standard, fall   Orthopedic Precautions:    Braces: LSO     Functional Mobility:  · Bed Mobility:   Pt needed verbal cues for hand placement and sequencing for functional mobility.   · Rolling Left:  minimum assistance. Pt had Bowel movement with standing and was rolled for cleaning.   · Rolling Right: moderate assistance  · Supine to Sit: total assistance  · Sit to Supine: total assistance  ·   · Transfers:     · Sit to Stand:  maximal assistance and of 2 persons with hand-held assist. Pt was not able to come to upright posture with standing.   ·   · Balance: pt sat on EOB x 10 min with SBA to max assist pt leaned backwards with sitting.       AM-PAC 6 CLICK MOBILITY  Turning over in bed (including adjusting bedclothes, sheets and blankets)?: 2  Sitting down on and standing up from a chair with arms (e.g., wheelchair, bedside commode, etc.): 2  Moving from lying on back to sitting on the side of the bed?: 2  Moving to and from a bed to a chair (including a wheelchair)?: 2  Need to walk in hospital room?: 1  Climbing 3-5 steps with a railing?: 1  Basic Mobility Total Score: 10       Therapeutic Activities and Exercises:   pt performed AROM there exer BLE in sitting EOB x 10 reps.     Patient left supine with all lines intact and call button in reach..    GOALS:   Multidisciplinary Problems     Physical Therapy Goals        Problem: Physical Therapy Goal    Goal Priority Disciplines Outcome Goal Variances Interventions   Physical Therapy Goal     PT, PT/OT      Description:  Goals to be met by: 19    Patient will increase functional independence with mobility by performin. Supine to sit with Moderate Assistance -not met  2. Sit to stand transfer with Minimal Assistance -not met  3. Gait  x 30  feet with Contact Guard Assistance using Rolling Walker. -not met  4. Lower extremity exercise program x15 reps , with assistance as needed- met 5/20                       Time Tracking:     PT Received On: 05/22/19  PT Start Time: 0745     PT Stop Time: 0813  PT Total Time (min): 28 min     Billable Minutes: Therapeutic Activity 15 min and Therapeutic Exercise 13 min    Treatment Type: Other (see comments)(co-treatment with OT)  PT/PTA: PT     PTA Visit Number: 0     Cathy Taylor, PT  05/22/2019

## 2019-05-22 NOTE — SUBJECTIVE & OBJECTIVE
Interval History: Afebrile over last 24 hours. WBC count trending down.  Hgb stable.      Review of Systems   Constitutional: Negative for activity change, chills, diaphoresis and fever.   Respiratory: Negative for cough, shortness of breath and wheezing.    Cardiovascular: Negative for chest pain.   Gastrointestinal: Negative for abdominal pain, constipation, diarrhea, nausea and vomiting.   Genitourinary: Negative for dysuria, frequency and urgency.   Neurological: Negative for dizziness.   Hematological: Does not bruise/bleed easily.     Objective:     Vital Signs (Most Recent):  Temp: 99.6 °F (37.6 °C) (05/22/19 1100)  Pulse: 99 (05/22/19 1221)  Resp: (!) 28 (05/21/19 1500)  BP: (!) 176/79 (05/22/19 1210)  SpO2: 95 % (05/22/19 1221) Vital Signs (24h Range):  Temp:  [99.3 °F (37.4 °C)-99.8 °F (37.7 °C)] 99.6 °F (37.6 °C)  Pulse:  [] 99  Resp:  [25-28] 28  SpO2:  [95 %-100 %] 95 %  BP: ()/(55-94) 176/79     Weight: 76 kg (167 lb 8.8 oz)  Body mass index is 27.88 kg/m².    Estimated Creatinine Clearance: 89.3 mL/min (based on SCr of 0.7 mg/dL).    Physical Exam   Constitutional: She is oriented to person, place, and time. She appears well-developed and well-nourished. No distress.   HENT:   Head: Normocephalic and atraumatic.   Neck:   tracheostomy in place   Cardiovascular: Normal rate, regular rhythm and normal heart sounds. Exam reveals no gallop and no friction rub.   No murmur heard.  Pulmonary/Chest: Effort normal and breath sounds normal. No stridor. No respiratory distress.   Abdominal: Soft. She exhibits no distension. There is no tenderness.   Midline incision c/d/i   Genitourinary:   Genitourinary Comments: Nephrostomy tube in place with clear yellow urine output   Musculoskeletal: She exhibits no edema.   Neurological: She is alert and oriented to person, place, and time.   Following commands   Skin: Skin is warm and dry. She is not diaphoretic.   Psychiatric: She has a normal mood and  affect. Her behavior is normal.       Significant Labs:   Blood Culture:   Recent Labs   Lab 05/10/19  1015 05/10/19  1020 05/19/19  1843 05/20/19  1300 05/20/19  1306   LABBLOO No growth after 5 days. No growth after 5 days. No Growth to date  No Growth to date  No Growth to date No Growth to date  No Growth to date No Growth to date  No Growth to date     CBC:   Recent Labs   Lab 05/20/19  1718 05/21/19  0339 05/22/19  0650   WBC 22.62* 20.27* 16.09*   HGB 8.3* 7.6* 7.6*   HCT 25.5* 23.9* 24.5*    226 254     CMP:   Recent Labs   Lab 05/21/19  0339 05/22/19  0450    142   K 4.3 4.1   * 112*   CO2 20* 22*   * 112*   BUN 39* 32*   CREATININE 0.8 0.7   CALCIUM 8.7 8.6*   PROT 5.5* 5.8*   ALBUMIN 1.4* 1.5*   BILITOT 0.6 0.6   ALKPHOS 102 116   AST 22 23   ALT <5* 8*   ANIONGAP 9 8   EGFRNONAA >60.0 >60.0     Respiratory Culture:   Recent Labs   Lab 04/30/19  0900 05/08/19  1300 05/19/19  1130   GSRESP <10 epithelial cells per low power field.  No WBC's  No organisms seen Rare WBC's  No organisms seen Few WBC's  Few Gram negative rods   RESPIRATORYC  --  No growth PSEUDOMONAS AERUGINOSA  Many       Urine Culture:   Recent Labs   Lab 04/21/19  0640 04/21/19  2256 04/25/19  0252 05/13/19  1846 05/19/19  1154   LABURIN No growth No significant growth No growth CANDIDA ALBICANS  > 100,000 cfu/ml  Treatment of asymptomatic candiduria is not recommended (except for   specific populations). Candida isolated in the urine typically   represents colonization. If an indwelling urinary catheter is present  it should be removed or replaced.  Susceptibility testing not routinely performed   CANDIDA GLABRATA  > 100,000 cfu/ml  Treatment of asymptomatic candiduria is not recommended (except for   specific populations). Candida isolated in the urine typically   represents colonization. If an indwelling urinary catheter is   present it should be removed or replaced.       Urine Studies:   Recent Labs    Lab 05/19/19  1154   COLORU Argelia   APPEARANCEUA Cloudy*   PHUR 5.0   SPECGRAV 1.020   PROTEINUA 1+*   GLUCUA Negative   KETONESU Negative   BILIRUBINUA Negative   OCCULTUA 1+*   NITRITE Negative   LEUKOCYTESUR 1+*   RBCUA 3   WBCUA 20*   BACTERIA Many*   SQUAMEPITHEL 1   HYALINECASTS 0     Wound Culture:   Recent Labs   Lab 04/21/19  0125 05/19/19  1241   LABAERO STAPHYLOCOCCUS LUGDUNENSIS  Rare   No growth     All pertinent labs within the past 24 hours have been reviewed.    Significant Imaging: I have reviewed all pertinent imaging results/findings within the past 24 hours.   X-Ray Chest AP Portable [[id number]] Resulted: 05/21/19 1055   Order Status: Completed Updated: 05/21/19 1057   Narrative:     EXAMINATION:  XR CHEST AP PORTABLE    CLINICAL HISTORY:  PNA;    COMPARISON:  Comparison is made to 05/21/2019 at 06:02.    FINDINGS:  Tracheostomy cannula again seen.  Cardiomediastinal silhouette is magnified both by projection and by a poor inspiratory depth level.  I doubt that the true cardiac size is any larger than upper limit of normal allowing for these technical factors, and the cardiomediastinal silhouette has not changed appreciably since the examination referenced above.  Pulmonary vascularity in the upper lung zones is minimally prominent, as before.  Lung zones appear stable, with some patchy airspace consolidation in the left mid/lower lung zones again seen but with no significant new areas of airspace consolidation or volume loss evident.  Some of the opacity in the inferior hemothorax on the left side may reflect pleural fluid.  No definite pleural fluid on the right.  No pneumothorax.   Impression:       Allowing for an even poorer inspiratory depth level on the current exam, there has been no significant interval change in the appearance of the chest since 05/21/2019 at 06:02.      Electronically signed by: Christian Moreno MD  Date: 05/21/2019  Time: 10:55   X-Ray Chest AP Portable [[id number]]  Resulted: 05/21/19 0800   Order Status: Completed Updated: 05/21/19 0802   Narrative:     EXAMINATION:  XR CHEST AP PORTABLE    CLINICAL HISTORY:  PNA;    COMPARISON:  Comparison is made to 05/20/2019.    FINDINGS:  No significant interval change in the appearance of the chest since 05/20/2019 is appreciated, allowing for a poorer inspiratory depth level on the current examination.  No pneumothorax.   Impression:       As above      Electronically signed by: Christian Moreno MD  Date: 05/21/2019  Time: 08:00   X-Ray Chest AP Portable [[id number]] Resulted: 05/20/19 0717   Order Status: Completed Updated: 05/20/19 0719   Narrative:     EXAMINATION:  XR CHEST AP PORTABLE    CLINICAL HISTORY:  eval consolidation;    TECHNIQUE:  Single frontal view of the chest was performed.    COMPARISON:  May 19, 2019.    FINDINGS:  Tracheostomy cannula monitoring leads remain.  Heart size is similar.  Increase in the pulmonary vascular and perihilar alveolar markings though some improvement in the aeration in the peripheral lung fields.   there may be some pleural fluid on the left.  No pneumothorax.   Impression:       Findings most consistent with congestive heart failure.  Aeration is improved.      Electronically signed by: Mk Reilly MD  Date: 05/20/2019  Time: 07:17   X-Ray Chest AP Portable [[id number]] Resulted: 05/19/19 1513   Order Status: Completed Updated: 05/19/19 1516   Narrative:     EXAMINATION:  XR CHEST AP PORTABLE    CLINICAL HISTORY:  respiratory distress;    TECHNIQUE:  Single frontal view of the chest was performed.    COMPARISON:  05/19/2019 at 05:11    FINDINGS:  Tracheostomy tube in the clavicular heads.    Diffuse alveolar filling process unchanged.  No pneumothorax or pleural effusion.  The cardiomediastinal silhouette, osseous and soft tissue structures are stable.   Impression:       No significant interval change in what is likely diffuse alveolar pulmonary edema.  Infection can have a similar  appearance.      Electronically signed by: Sharon Bro MD  Date: 05/19/2019  Time: 15:13   X-Ray Chest AP Portable [[id number]] Resulted: 05/19/19 0558   Order Status: Completed Updated: 05/19/19 0600   Narrative:     EXAMINATION:  XR CHEST AP PORTABLE    CLINICAL HISTORY:  trach;    TECHNIQUE:  Single frontal view of the chest was performed.    COMPARISON:  05/18/2019    FINDINGS:  Tracheostomy cannula projects in stable radiographic position.  Cardiomediastinal silhouette is unchanged.  The lungs demonstrate persistent diffuse increased interstitial attenuation and perihilar opacities, similar to prior examination.  No evidence of pneumothorax.   Impression:       No significant detrimental interval change compared to 05/18/2019.      Electronically signed by: Al Wu MD  Date: 05/19/2019  Time: 05:58   X-Ray Chest AP Portable [[id number]] Resulted: 05/18/19 0751   Order Status: Completed Updated: 05/18/19 0754   Narrative:     EXAMINATION:  XR CHEST AP PORTABLE    CLINICAL HISTORY:  trach;    TECHNIQUE:  Single frontal view of the chest was performed.    COMPARISON:  Chest AP portable dated 05/17/2019    FINDINGS:  Tracheostomy tube again noted.  Coarsened, central predominant interstitial attenuation with improved left perihilar infiltrate and new left basilar opacity.  Heart size is similar.  No pneumothorax or free air.   Impression:       As above      Electronically signed by: Tristan Flores  Date: 05/18/2019  Time: 07:51   X-Ray Chest AP Portable [[id number]] Resulted: 05/17/19 0903   Order Status: Completed Updated: 05/17/19 0905   Narrative:     EXAMINATION:  XR CHEST AP PORTABLE    CLINICAL HISTORY:  trach;    TECHNIQUE:  Single frontal view of the chest was performed.    COMPARISON:  No 05/16/2019 ne      Electronically signed by: Christian Valencia MD  Date: 05/17/2019  Time: 09:03   X-Ray Chest 1 View [[id number]] Resulted: 05/16/19 0747   Order Status: Completed Updated: 05/16/19 0750    Narrative:     EXAMINATION:  XR CHEST 1 VIEW    CLINICAL HISTORY:  mechanically assisted;    TECHNIQUE:  Single frontal view of the chest was performed.    COMPARISON:  05/15/2019    FINDINGS:  Tracheostomy tube is present vascular catheters in place.  Heart size is unchanged.  There is little infiltrate present in the perihilar area on the left.  No pneumothorax.   Impression:       See above      Electronically signed by: Florin Cooley MD  Date: 05/16/2019  Time: 07:47   X-Ray Chest 1 View [[id number]] Resulted: 05/15/19 0726   Order Status: Completed Updated: 05/15/19 0729   Narrative:     EXAMINATION:  XR CHEST 1 VIEW    CLINICAL HISTORY:  mechanically assisted;    COMPARISON:  Comparison is made to 05/14/2019.    FINDINGS:  No significant interval change in the appearance of the chest since 05/14/2019 is appreciated, with the current examination demonstrating a slightly improved inspiratory depth level.  No pneumothorax.   Impression:       As above      Electronically signed by: Christian Moreno MD  Date: 05/15/2019  Time: 07:26

## 2019-05-22 NOTE — PLAN OF CARE
Problem: Occupational Therapy Goal  Goal: Occupational Therapy Goal  Goals to be met by: 5/20/19     Patient will increase functional independence with ADLs by performing:    UE Dressing with Set-up Assistance.  LE Dressing with Maximum Assistance and Assistive Devices as needed.  Grooming while standing with Minimal Assistance.  Toileting from bedside commode with Minimal Assistance for hygiene and clothing management.   Sitting at edge of bed x10 minutes with Supervision.  Rolling to Bilateral with Minimal Assistance.   Supine to sit with Minimal Assistance.  Toilet transfer to bedside commode with Minimal Assistance.     Outcome: Ongoing (interventions implemented as appropriate)  The above goals remain appropriate. PRIMITIVO Dash  5/22/2019

## 2019-05-22 NOTE — ASSESSMENT & PLAN NOTE
Mariann Huff is a 59 y.o. female s/p left ureteral injury on 4/12, presented with fever, AMS, and intraabdominal abscess, taken for washout and ligation of left ureter with neph tube placement on 4/21, Trach/PEG 5/2.  - on vent, spontaneous mode; wean per SICU  - Tube feeds per SICU  - cefepime, rifampin per ID recs   - 5/13 UCx growing Candida albicans; started 2-week course of Diflucan   - 5/19 BAL growing Pseudomonas   - ID recommends reimaging patient should she continue to show signs of infection  - Maintain neph tube and Fontenot  - Urine output adequate, Cr stable and WNL  - diuresis/fluids per SICU  - bloody BMs have subsided, H&H stable; flexible sigmoidoscopy on 5/21/19 showed no signs of bleeding, Colorectal recommends full colonoscopy and EGD    Dispo: continue ICU care; plan for transfer to LTAC once stable.

## 2019-05-22 NOTE — PLAN OF CARE
Problem: Occupational Therapy Goal  Goal: Occupational Therapy Goal  Goals to be met by: 6/5/19     Patient will increase functional independence with ADLs by performing:    UE Dressing with Set-up Assistance.  LE Dressing with Maximum Assistance and Assistive Devices as needed.  Grooming while standing with Minimal Assistance.  Toileting from bedside commode with Minimal Assistance for hygiene and clothing management.   Sitting at edge of bed x10 minutes with Supervision.  Rolling to Bilateral with Minimal Assistance.   Supine to sit with Minimal Assistance.  Toilet transfer to bedside commode with Minimal Assistance.     Outcome: Ongoing (interventions implemented as appropriate)  The above goals remain appropriate to be extended. PRIMITIVO Dash  5/22/2019

## 2019-05-22 NOTE — PROGRESS NOTES
[facility]  Infectious Disease  Progress Note    Patient Name: Mariann Huff  MRN: [id number]  Admission Date: 4/20/2019  Length of Stay: 32 days  Attending Physician: Robin Boyd MD  Primary Care Provider: Jasbir Haney MD    Isolation Status: No active isolations  Assessment/Plan:      Sepsis     58 y/o female with HTN, DMII, CAD, NSTEMI, hx of L5-S1 OLIF on 4/12 c/b intraoperative left ureteral injury s/p ureteroureteral anastomoses and ureteral stent placement presents with fevers and AMS found to have E. Coli septicemia and air and fluid within the retroperitoneal and within the left collecting system.    She underwent ex-lap and washout on 4/21. Per op note,  There were pockets of purulence evacuated. Posterior to the stent there was a pocket of purulent fluid and exposed hardware along the spinal vertebrae. The tissue along the distal and proximal end of ureter were friable and unhealthy. She underwent left nephrostomy tube placement. The stent was removed and several metal clips were placed on proximal end of the ureteral. OR cultures with Staph Lugdenensis. She has been on Cefazolin and Rifampin with a plan to complete 6 weeks.    ID re-consulted as patient developed fevers and a leukocytosis. Procalc 20. Abx broadened to Vancomycin, Cefepime, Rifampin and Fluconazole. Lines exchanged. 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  CXR with new L basilar consolidation c/f HAP.     Blood cx negative. Respiratory culture (endotracheal aspirate) - Pseudomonas. Urine cx - candida glabrata.    Since broadening coverage her fever curve is improving. T max 100.8. WBC count trending down. Tachycardic. PE still on differential.       Plan  - Continue Cefepime x 7 days total for HAP. Would then transition back to Cefazolin plus Rifampin for prior E. Coli and Staph lugdenesis x 8 weeks total  (estimated end date 6/18/19)  - Discontinue Vancomycin (no MRSA growth)  - Continue Fluconazole but increase dose to 800 mg PO daily x 7 days total for C. Glabrata.  - As she continues to remain febrile, recommend repeat CT A/P to reassess status of fluid collection.  - ID will follow.                   Please call for any questions. Thank you.  Geneva Bran PA-C  Phone: [phone]  Pager: [phone]    Subjective:     Principal Problem:Ureteral transection of left native kidney    HPI: 60 y/o female with HTN, DMII, CAD, NSTEMI, s/p L5-S1 OLIF with NSGY 4/12 sustained intraoperative laceration and underwent ureteroureteral anastomoses and ureteral stent placement. She was discharged with a correa and MADHURI drain that was removed on 4/16. She was seen by urology and anticipated stent removal in 2-3 months (6/2019).  She presents to ED with AMS and sepsis. She was febrile 103 in ED. WBC 5K on admit. Lactate 4.6. Cath UA with 3+leukocytes, >100 WBcs and many bacteria.     MRI: Postsurgical change of L5-S1 posterior spinal fusion and anterior interbody fusion with a 4.4 cm fluid fluid collection in the left anterior prevertebral soft tissues at the level of the L5-S1 disc space.  Findings may represent postoperative seroma or evolving hematoma however, urinoma not excluded.  No definite abscess although correlation is advised.    Irregular flow void involving the right common iliac vein, underlying thrombus not excluded.      CT: air collection within the anterior paraspinous soft tissues through which the left ureter courses. Given that the ureter courses through this, communication of the ureter with this collection is not excluded.  There is a ureteral stent within the left ureter.Punctate foci of air in L5-S1.  2. Air within the left renal collecting system, along the left ureter, and within the urinary bladder, correlation advised.    This was not amendable for drainage by IR so she was taken to OR for washout.     She  underwent ex-lap of left ureter and left nephrostomy tube placement 4/21/19.   Per op note,  There were pocket of purulence noted and evacuated, sent for culture of left retroperitoneum to pole of left kidney. The stent was visible. Posterior to the stent there was a pocket of purulent fluid and exposed hardware along the spinal vertebrae. The tissue along the distal and proximal end of ureter were friable and unhealthy. She underwent left nephrostomy tube placement. The stent was removed and several metal clips were placed on proximal end of the ureteral. MADHURI drain was placed into left retroperitoneal.     Blood cultures were are positive for E coli..   Urine cultures +E.coli  OR cultures were positive for Staph Ludenensis  She is was treated with zosyn. She is in the ICU, intubated, sedated    ID had seen the patient and there was concern for hardware involvement given proximity and the patient was placed on ceftriaxone and rifampin with plan for 6 weeks of abx.  ID was reconsulted due to fever and leukocytosis.  Patient denied any abdominal pain , fever, chills or sweats.  Repeat Bblood cultures and urine cultures are no growth.  Patient was broadened to Vanc, CTX and Rifampin, later vanc was stopped.  Patient improved and leukocytosis and fever resolved.  Patient had PEG and Tracheostomy.  ID signed off with plans to complete Ceftriaxone and rifampin for 6 weeks through 6/4.    Patient recently began again with progressive leukocytosis and fever so ID reconsulted.  Patient has been with low H/H and bleeding per rectum per nurse.  She has had 2 transfusions on 5/14,16 and 19. Patient denies any new complaints including fever, chills, sweats, N/V/D skin problems, rash, CP, pleuritic CP. CXR with new LLL consolidation and nurse reports increased coarse breath sounds and increased thick sputum.  Patient reports SOB.  Endotracheal aspirate shows GNRs  Blood cultures have been resent.  Of note patient had a  subcutaneous fluid collection on last CT ABD.    Interval History: Afebrile over last 24 hours. WBC count trending down.  Hgb stable.      Review of Systems   Constitutional: Negative for activity change, chills, diaphoresis and fever.   Respiratory: Negative for cough, shortness of breath and wheezing.    Cardiovascular: Negative for chest pain.   Gastrointestinal: Negative for abdominal pain, constipation, diarrhea, nausea and vomiting.   Genitourinary: Negative for dysuria, frequency and urgency.   Neurological: Negative for dizziness.   Hematological: Does not bruise/bleed easily.     Objective:     Vital Signs (Most Recent):  Temp: 99.6 °F (37.6 °C) (05/22/19 1100)  Pulse: 99 (05/22/19 1221)  Resp: (!) 28 (05/21/19 1500)  BP: (!) 176/79 (05/22/19 1210)  SpO2: 95 % (05/22/19 1221) Vital Signs (24h Range):  Temp:  [99.3 °F (37.4 °C)-99.8 °F (37.7 °C)] 99.6 °F (37.6 °C)  Pulse:  [] 99  Resp:  [25-28] 28  SpO2:  [95 %-100 %] 95 %  BP: ()/(55-94) 176/79     Weight: 76 kg (167 lb 8.8 oz)  Body mass index is 27.88 kg/m².    Estimated Creatinine Clearance: 89.3 mL/min (based on SCr of 0.7 mg/dL).    Physical Exam   Constitutional: She is oriented to person, place, and time. She appears well-developed and well-nourished. No distress.   HENT:   Head: Normocephalic and atraumatic.   Neck:   tracheostomy in place   Cardiovascular: Normal rate, regular rhythm and normal heart sounds. Exam reveals no gallop and no friction rub.   No murmur heard.  Pulmonary/Chest: Effort normal and breath sounds normal. No stridor. No respiratory distress.   Abdominal: Soft. She exhibits no distension. There is no tenderness.   Midline incision c/d/i   Genitourinary:   Genitourinary Comments: Nephrostomy tube in place with clear yellow urine output   Musculoskeletal: She exhibits no edema.   Neurological: She is alert and oriented to person, place, and time.   Following commands   Skin: Skin is warm and dry. She is not  diaphoretic.   Psychiatric: She has a normal mood and affect. Her behavior is normal.       Significant Labs:   Blood Culture:   Recent Labs   Lab 05/10/19  1015 05/10/19  1020 05/19/19  1843 05/20/19  1300 05/20/19  1306   LABBLOO No growth after 5 days. No growth after 5 days. No Growth to date  No Growth to date  No Growth to date No Growth to date  No Growth to date No Growth to date  No Growth to date     CBC:   Recent Labs   Lab 05/20/19  1718 05/21/19  0339 05/22/19  0650   WBC 22.62* 20.27* 16.09*   HGB 8.3* 7.6* 7.6*   HCT 25.5* 23.9* 24.5*    226 254     CMP:   Recent Labs   Lab 05/21/19  0339 05/22/19  0450    142   K 4.3 4.1   * 112*   CO2 20* 22*   * 112*   BUN 39* 32*   CREATININE 0.8 0.7   CALCIUM 8.7 8.6*   PROT 5.5* 5.8*   ALBUMIN 1.4* 1.5*   BILITOT 0.6 0.6   ALKPHOS 102 116   AST 22 23   ALT <5* 8*   ANIONGAP 9 8   EGFRNONAA >60.0 >60.0     Respiratory Culture:   Recent Labs   Lab 04/30/19  0900 05/08/19  1300 05/19/19  1130   GSRESP <10 epithelial cells per low power field.  No WBC's  No organisms seen Rare WBC's  No organisms seen Few WBC's  Few Gram negative rods   RESPIRATORYC  --  No growth PSEUDOMONAS AERUGINOSA  Many       Urine Culture:   Recent Labs   Lab 04/21/19  0640 04/21/19  2256 04/25/19  0252 05/13/19  1846 05/19/19  1154   LABURIN No growth No significant growth No growth CANDIDA ALBICANS  > 100,000 cfu/ml  Treatment of asymptomatic candiduria is not recommended (except for   specific populations). Candida isolated in the urine typically   represents colonization. If an indwelling urinary catheter is present  it should be removed or replaced.  Susceptibility testing not routinely performed   CANDIDA GLABRATA  > 100,000 cfu/ml  Treatment of asymptomatic candiduria is not recommended (except for   specific populations). Candida isolated in the urine typically   represents colonization. If an indwelling urinary catheter is   present it should be  removed or replaced.       Urine Studies:   Recent Labs   Lab 05/19/19  1154   COLORU Argelia   APPEARANCEUA Cloudy*   PHUR 5.0   SPECGRAV 1.020   PROTEINUA 1+*   GLUCUA Negative   KETONESU Negative   BILIRUBINUA Negative   OCCULTUA 1+*   NITRITE Negative   LEUKOCYTESUR 1+*   RBCUA 3   WBCUA 20*   BACTERIA Many*   SQUAMEPITHEL 1   HYALINECASTS 0     Wound Culture:   Recent Labs   Lab 04/21/19  0125 05/19/19  1241   LABAERO STAPHYLOCOCCUS LUGDUNENSIS  Rare   No growth     All pertinent labs within the past 24 hours have been reviewed.    Significant Imaging: I have reviewed all pertinent imaging results/findings within the past 24 hours.   X-Ray Chest AP Portable [[id number]] Resulted: 05/21/19 1055   Order Status: Completed Updated: 05/21/19 1057   Narrative:     EXAMINATION:  XR CHEST AP PORTABLE    CLINICAL HISTORY:  PNA;    COMPARISON:  Comparison is made to 05/21/2019 at 06:02.    FINDINGS:  Tracheostomy cannula again seen.  Cardiomediastinal silhouette is magnified both by projection and by a poor inspiratory depth level.  I doubt that the true cardiac size is any larger than upper limit of normal allowing for these technical factors, and the cardiomediastinal silhouette has not changed appreciably since the examination referenced above.  Pulmonary vascularity in the upper lung zones is minimally prominent, as before.  Lung zones appear stable, with some patchy airspace consolidation in the left mid/lower lung zones again seen but with no significant new areas of airspace consolidation or volume loss evident.  Some of the opacity in the inferior hemothorax on the left side may reflect pleural fluid.  No definite pleural fluid on the right.  No pneumothorax.   Impression:       Allowing for an even poorer inspiratory depth level on the current exam, there has been no significant interval change in the appearance of the chest since 05/21/2019 at 06:02.      Electronically signed by: Christian Moreno MD  Date:  05/21/2019  Time: 10:55   X-Ray Chest AP Portable [[id number]] Resulted: 05/21/19 0800   Order Status: Completed Updated: 05/21/19 0802   Narrative:     EXAMINATION:  XR CHEST AP PORTABLE    CLINICAL HISTORY:  PNA;    COMPARISON:  Comparison is made to 05/20/2019.    FINDINGS:  No significant interval change in the appearance of the chest since 05/20/2019 is appreciated, allowing for a poorer inspiratory depth level on the current examination.  No pneumothorax.   Impression:       As above      Electronically signed by: Christian Moreno MD  Date: 05/21/2019  Time: 08:00   X-Ray Chest AP Portable [[id number]] Resulted: 05/20/19 0717   Order Status: Completed Updated: 05/20/19 0719   Narrative:     EXAMINATION:  XR CHEST AP PORTABLE    CLINICAL HISTORY:  eval consolidation;    TECHNIQUE:  Single frontal view of the chest was performed.    COMPARISON:  May 19, 2019.    FINDINGS:  Tracheostomy cannula monitoring leads remain.  Heart size is similar.  Increase in the pulmonary vascular and perihilar alveolar markings though some improvement in the aeration in the peripheral lung fields.   there may be some pleural fluid on the left.  No pneumothorax.   Impression:       Findings most consistent with congestive heart failure.  Aeration is improved.      Electronically signed by: Mk Reilly MD  Date: 05/20/2019  Time: 07:17   X-Ray Chest AP Portable [[id number]] Resulted: 05/19/19 1513   Order Status: Completed Updated: 05/19/19 1516   Narrative:     EXAMINATION:  XR CHEST AP PORTABLE    CLINICAL HISTORY:  respiratory distress;    TECHNIQUE:  Single frontal view of the chest was performed.    COMPARISON:  05/19/2019 at 05:11    FINDINGS:  Tracheostomy tube in the clavicular heads.    Diffuse alveolar filling process unchanged.  No pneumothorax or pleural effusion.  The cardiomediastinal silhouette, osseous and soft tissue structures are stable.   Impression:       No significant interval change in what is likely diffuse alveolar  pulmonary edema.  Infection can have a similar appearance.      Electronically signed by: Sharon Bro MD  Date: 05/19/2019  Time: 15:13   X-Ray Chest AP Portable [[id number]] Resulted: 05/19/19 0558   Order Status: Completed Updated: 05/19/19 0600   Narrative:     EXAMINATION:  XR CHEST AP PORTABLE    CLINICAL HISTORY:  trach;    TECHNIQUE:  Single frontal view of the chest was performed.    COMPARISON:  05/18/2019    FINDINGS:  Tracheostomy cannula projects in stable radiographic position.  Cardiomediastinal silhouette is unchanged.  The lungs demonstrate persistent diffuse increased interstitial attenuation and perihilar opacities, similar to prior examination.  No evidence of pneumothorax.   Impression:       No significant detrimental interval change compared to 05/18/2019.      Electronically signed by: Al Wu MD  Date: 05/19/2019  Time: 05:58   X-Ray Chest AP Portable [[id number]] Resulted: 05/18/19 0751   Order Status: Completed Updated: 05/18/19 0754   Narrative:     EXAMINATION:  XR CHEST AP PORTABLE    CLINICAL HISTORY:  trach;    TECHNIQUE:  Single frontal view of the chest was performed.    COMPARISON:  Chest AP portable dated 05/17/2019    FINDINGS:  Tracheostomy tube again noted.  Coarsened, central predominant interstitial attenuation with improved left perihilar infiltrate and new left basilar opacity.  Heart size is similar.  No pneumothorax or free air.   Impression:       As above      Electronically signed by: Tristan Flores  Date: 05/18/2019  Time: 07:51   X-Ray Chest AP Portable [[id number]] Resulted: 05/17/19 0903   Order Status: Completed Updated: 05/17/19 0905   Narrative:     EXAMINATION:  XR CHEST AP PORTABLE    CLINICAL HISTORY:  trach;    TECHNIQUE:  Single frontal view of the chest was performed.    COMPARISON:  No 05/16/2019 ne      Electronically signed by: Christian Valencia MD  Date: 05/17/2019  Time: 09:03   X-Ray Chest 1 View [[id number]] Resulted: 05/16/19 0747   Order  Status: Completed Updated: 05/16/19 0750   Narrative:     EXAMINATION:  XR CHEST 1 VIEW    CLINICAL HISTORY:  mechanically assisted;    TECHNIQUE:  Single frontal view of the chest was performed.    COMPARISON:  05/15/2019    FINDINGS:  Tracheostomy tube is present vascular catheters in place.  Heart size is unchanged.  There is little infiltrate present in the perihilar area on the left.  No pneumothorax.   Impression:       See above      Electronically signed by: Florin Cooley MD  Date: 05/16/2019  Time: 07:47   X-Ray Chest 1 View [[id number]] Resulted: 05/15/19 0726   Order Status: Completed Updated: 05/15/19 0729   Narrative:     EXAMINATION:  XR CHEST 1 VIEW    CLINICAL HISTORY:  mechanically assisted;    COMPARISON:  Comparison is made to 05/14/2019.    FINDINGS:  No significant interval change in the appearance of the chest since 05/14/2019 is appreciated, with the current examination demonstrating a slightly improved inspiratory depth level.  No pneumothorax.   Impression:       As above      Electronically signed by: Christian Moreno MD  Date: 05/15/2019  Time: 07:26

## 2019-05-22 NOTE — CARE UPDATE
RN reported BRBM x2 throughout the day. Will obtain pm CBC to monitor Hg. Reached out to GI Team for unknown source of GIB. Appreciate their assistance.    Zoë Du MD  Surgery - Critical Care

## 2019-05-22 NOTE — PLAN OF CARE
Problem: Adult Inpatient Plan of Care  Goal: Plan of Care Review  Outcome: Ongoing (interventions implemented as appropriate)  Patient AAOx4. Plan of care and all safety precautions maintained and discussed. Vital signs stable throughout the shift; TMAX 99.6. Trach spontaneous PEEP 8.0 FiO2 40%. PRN medication administered for complaint of back pain. PEG tube with Peptamen at goal of 40 mL/hr. Fontenot catheter & nephrostomy tube to gravity. No acute events throughout the shift/no rectal bleeding. Call bell in reach. Encouraged to call for assistance. Will continue to monitor.

## 2019-05-22 NOTE — PROGRESS NOTES
[facility]  Infectious Disease  Progress Note    Patient Name: Mariann Huff  MRN: [id number]  Admission Date: 4/20/2019  Length of Stay: 31 days  Attending Physician: Robin Boyd MD  Primary Care Provider: Jasbir Haney MD    Isolation Status: No active isolations  Assessment/Plan:      Sepsis  60 y/o female with HTN, DMII, CAD, NSTEMI, s/p L5-S1 OLIF with NSGY 4/12 c/b intraoperative left ureteral injury and underwent ureteroureteral anastomoses and ureteral stent placement. She was discharged with a correa and MADHURI drain that was removed on 4/16. She was seen by urology and anticipated stent removal in 2-3 months (6/2019).  She presents to ED with AMS and E.coli septicemia found to have air and fluid collection of left anterior prevertebral soft tissue with left ureter involvement. She underwent ex-lap and washout on 4/21. We are consulted for abx recommendations.      Per op note,  There were pocket of purulence noted and evacuated, sent for culture of left retroperitoneum to pole of left kidney. The stent was visible. Posterior to the stent there was a pocket of purulent fluid and exposed hardware along the spinal vertebrae. The tissue along the distal and proximal end of ureter were friable and unhealthy. She underwent left nephrostomy tube placement. The stent was removed and several metal clips were placed on proximal end of the ureteral.      OR cultures with Staph Lugdenensis - not currently covered.  Patient does appear to be having diarrhea - C diff negative but suspect may need restesting if WBC increases and no other source found.  QTc long at 480      Repeat BCXs sent and NGTD.  BAL done but no WBC no organisms seen - NGTD.  Source of fever and leukocytosis unclear but ABD suspected though could be non infectious - drug reaction vs PE/DVT.  CT abdomen with superficial fluid collection - possible seroma and inflammatory change around ureter.  C diff repeated and negative.  Line changes  jonathan cvc - removed.  RUE US shows partially occlusive thrombus in RIJ and proximal subclvian vein - suspected cause of fevers.  CXR clear last time seen by ID.    Again - She has developed fever and leukocytosis but appears not septic.  The patient denies any recent fever, chills, sweats, diarrhea, abdominal pain, pain, or dysuria.  She is still bleeding from rectum per nurse. CXR with new L basilar consolidation w cf HCAP.  ABD drain site with pus - cultured - ? Superficial..       Stable non septic    There could be multiple causes to fever and leukocytosis in this patient:  1. ABD - Prior CT 5/1  There are postsurgical and inflammatory changes in the ventral midline abdominal wall with a focal fluid collection in the midline measuring 2.2 x 4.7 x 6.6 cm (AP x TV x CC).  There is mild body wall edema  ? Resolved  2. GI bleeding - flex sig with no bleeding  - up to splenic flexure viewed  3. PNA - endotracheal aspirated with pseudomonas sensitive to cefepime  Blood cultures NGTD  Stable and improving      Plan  - Rec DC vanc and blood cultures negative and no GPC growth on any culture  - continue cefepime fo HCAP x 5-7d  -Continue Rifampin.  - drain site pus sent for culture - ? superificial - ngtd  - colonoscopy and EGD planned  - Recommend working up other causes of white count as outline above. - Recommend CT abdomen/pelvis as had SQ fluid on last CT  - if clinically deteriorates would do Vanc and Meropenem given long hospital exposure and cf MDROs    -ID Will follow                     Anticipated Disposition: tbd    Thank you for your consult. I will follow-up with patient. Please contact us if you have any additional questions.    POLLY Jerome  Infectious Disease  [facility]    Subjective:     Principal Problem:Ureteral transection of left native kidney    HPI: 58 y/o female with HTN, DMII, CAD, NSTEMI, s/p L5-S1 OLIF with NSGY 4/12 sustained intraoperative laceration and  underwent ureteroureteral anastomoses and ureteral stent placement. She was discharged with a correa and MADHURI drain that was removed on 4/16. She was seen by urology and anticipated stent removal in 2-3 months (6/2019).  She presents to ED with AMS and sepsis. She was febrile 103 in ED. WBC 5K on admit. Lactate 4.6. Cath UA with 3+leukocytes, >100 WBcs and many bacteria.     MRI: Postsurgical change of L5-S1 posterior spinal fusion and anterior interbody fusion with a 4.4 cm fluid fluid collection in the left anterior prevertebral soft tissues at the level of the L5-S1 disc space.  Findings may represent postoperative seroma or evolving hematoma however, urinoma not excluded.  No definite abscess although correlation is advised.    Irregular flow void involving the right common iliac vein, underlying thrombus not excluded.      CT: air collection within the anterior paraspinous soft tissues through which the left ureter courses. Given that the ureter courses through this, communication of the ureter with this collection is not excluded.  There is a ureteral stent within the left ureter.Punctate foci of air in L5-S1.  2. Air within the left renal collecting system, along the left ureter, and within the urinary bladder, correlation advised.    This was not amendable for drainage by IR so she was taken to OR for washout.     She underwent ex-lap of left ureter and left nephrostomy tube placement 4/21/19.   Per op note,  There were pocket of purulence noted and evacuated, sent for culture of left retroperitoneum to pole of left kidney. The stent was visible. Posterior to the stent there was a pocket of purulent fluid and exposed hardware along the spinal vertebrae. The tissue along the distal and proximal end of ureter were friable and unhealthy. She underwent left nephrostomy tube placement. The stent was removed and several metal clips were placed on proximal end of the ureteral. MADHURI drain was placed into left  retroperitoneal.     Blood cultures were are positive for E coli..   Urine cultures +E.coli  OR cultures were positive for Staph Ludenensis  She is was treated with zosyn. She is in the ICU, intubated, sedated    ID had seen the patient and there was concern for hardware involvement given proximity and the patient was placed on ceftriaxone and rifampin with plan for 6 weeks of abx.  ID was reconsulted due to fever and leukocytosis.  Patient denied any abdominal pain , fever, chills or sweats.  Repeat Bblood cultures and urine cultures are no growth.  Patient was broadened to Vanc, CTX and Rifampin, later vanc was stopped.  Patient improved and leukocytosis and fever resolved.  Patient had PEG and Tracheostomy.  ID signed off with plans to complete Ceftriaxone and rifampin for 6 weeks through 6/4.    Patient recently began again with progressive leukocytosis and fever so ID reconsulted.  Patient has been with low H/H and bleeding per rectum per nurse.  She has had 2 transfusions on 5/14,16 and 19. Patient denies any new complaints including fever, chills, sweats, N/V/D skin problems, rash, CP, pleuritic CP. CXR with new LLL consolidation and nurse reports increased coarse breath sounds and increased thick sputum.  Patient reports SOB.  Endotracheal aspirate shows GNRs  Blood cultures have been resent.  Of note patient had a subcutaneous fluid collection on last CT ABD.    Interval History: No AEON.  Tmax 100.7, T current  WNL.  Had flex sig this am.   The patient denies any recent fever, chills, or sweats.  SOB improving      Review of Systems   Constitutional: Negative for activity change, chills, diaphoresis and fever.   Respiratory: Negative for cough, shortness of breath and wheezing.    Cardiovascular: Negative for chest pain.   Gastrointestinal: Negative for abdominal pain, constipation, diarrhea, nausea and vomiting.   Genitourinary: Negative for dysuria, frequency and urgency.   Neurological: Negative for  dizziness.   Hematological: Does not bruise/bleed easily.     Objective:     Vital Signs (Most Recent):  Temp: 99.7 °F (37.6 °C) (05/21/19 1900)  Pulse: 94 (05/21/19 2145)  Resp: (!) 28 (05/21/19 1500)  BP: (!) 155/69 (05/21/19 1800)  SpO2: 98 % (05/21/19 2145) Vital Signs (24h Range):  Temp:  [98.5 °F (36.9 °C)-99.8 °F (37.7 °C)] 99.7 °F (37.6 °C)  Pulse:  [] 94  Resp:  [15-46] 28  SpO2:  [87 %-100 %] 98 %  BP: ()/(25-85) 155/69     Weight: 76 kg (167 lb 8.8 oz)  Body mass index is 27.88 kg/m².    Estimated Creatinine Clearance: 78.2 mL/min (based on SCr of 0.8 mg/dL).    Physical Exam   Constitutional: She is oriented to person, place, and time. She appears well-developed and well-nourished. No distress.       HENT:   Head: Normocephalic and atraumatic.   Cardiovascular: Normal rate, regular rhythm and normal heart sounds. Exam reveals no gallop and no friction rub.   No murmur heard.  Pulmonary/Chest: Effort normal and breath sounds normal. No stridor. No respiratory distress. She has no wheezes. She has no rales.   Abdominal: Soft. Bowel sounds are normal. She exhibits no distension and no mass. There is no tenderness. There is no rebound and no guarding.   Musculoskeletal: She exhibits no edema.   Neurological: She is alert and oriented to person, place, and time.   Skin: Skin is warm and dry. She is not diaphoretic.   Psychiatric: She has a normal mood and affect. Her behavior is normal.       Significant Labs:   Blood Culture:   Recent Labs   Lab 05/10/19  1015 05/10/19  1020 05/19/19  1843 05/20/19  1300 05/20/19  1306   LABBLOO No growth after 5 days. No growth after 5 days. No Growth to date  No Growth to date  No Growth to date No Growth to date  No Growth to date No Growth to date  No Growth to date     CBC:   Recent Labs   Lab 05/20/19  1118 05/20/19  1718 05/21/19  0339   WBC 21.75* 22.62* 20.27*   HGB 6.7* 8.3* 7.6*   HCT 20.7* 25.5* 23.9*    215 226     CMP:   Recent Labs   Lab  05/20/19  0358 05/20/19  1118 05/21/19  0339    141 140   K 3.7 5.1 4.3   * 114* 111*   CO2 20* 22* 20*   * 245* 197*   BUN 43* 45* 39*   CREATININE 1.0 1.0 0.8   CALCIUM 8.5* 8.3* 8.7   PROT 5.8*  --  5.5*   ALBUMIN 1.6* 1.4* 1.4*   BILITOT 0.8  --  0.6   ALKPHOS 122  --  102   AST 24  --  22   ALT 6*  --  <5*   ANIONGAP 9 5* 9   EGFRNONAA >60.0 >60.0 >60.0     Respiratory Culture:   Recent Labs   Lab 04/30/19  0900 05/08/19  1300 05/19/19  1130   GSRESP <10 epithelial cells per low power field.  No WBC's  No organisms seen Rare WBC's  No organisms seen Few WBC's  Few Gram negative rods   RESPIRATORYC  --  No growth PSEUDOMONAS AERUGINOSA  Many       Urine Culture:   Recent Labs   Lab 04/21/19  0640 04/21/19  2256 04/25/19  0252 05/13/19  1846 05/19/19  1154   LABURIN No growth No significant growth No growth CANDIDA ALBICANS  > 100,000 cfu/ml  Treatment of asymptomatic candiduria is not recommended (except for   specific populations). Candida isolated in the urine typically   represents colonization. If an indwelling urinary catheter is present  it should be removed or replaced.  Susceptibility testing not routinely performed   YEAST   > 100,000 cfu/ml  Identification pending       Urine Studies:   Recent Labs   Lab 05/19/19  1154   COLORU Argelia   APPEARANCEUA Cloudy*   PHUR 5.0   SPECGRAV 1.020   PROTEINUA 1+*   GLUCUA Negative   KETONESU Negative   BILIRUBINUA Negative   OCCULTUA 1+*   NITRITE Negative   LEUKOCYTESUR 1+*   RBCUA 3   WBCUA 20*   BACTERIA Many*   SQUAMEPITHEL 1   HYALINECASTS 0     Wound Culture:   Recent Labs   Lab 04/21/19  0125 05/19/19  1241   LABAERO STAPHYLOCOCCUS LUGDUNENSIS  Rare   No growth     All pertinent labs within the past 24 hours have been reviewed.    Significant Imaging: I have reviewed all pertinent imaging results/findings within the past 24 hours.   X-Ray Chest AP Portable [[id number]] Resulted: 05/21/19 1055   Order Status: Completed Updated: 05/21/19  1057   Narrative:     EXAMINATION:  XR CHEST AP PORTABLE    CLINICAL HISTORY:  PNA;    COMPARISON:  Comparison is made to 05/21/2019 at 06:02.    FINDINGS:  Tracheostomy cannula again seen.  Cardiomediastinal silhouette is magnified both by projection and by a poor inspiratory depth level.  I doubt that the true cardiac size is any larger than upper limit of normal allowing for these technical factors, and the cardiomediastinal silhouette has not changed appreciably since the examination referenced above.  Pulmonary vascularity in the upper lung zones is minimally prominent, as before.  Lung zones appear stable, with some patchy airspace consolidation in the left mid/lower lung zones again seen but with no significant new areas of airspace consolidation or volume loss evident.  Some of the opacity in the inferior hemothorax on the left side may reflect pleural fluid.  No definite pleural fluid on the right.  No pneumothorax.   Impression:       Allowing for an even poorer inspiratory depth level on the current exam, there has been no significant interval change in the appearance of the chest since 05/21/2019 at 06:02.      Electronically signed by: Christian Moreno MD  Date: 05/21/2019  Time: 10:55   X-Ray Chest AP Portable [[id number]] Resulted: 05/21/19 0800   Order Status: Completed Updated: 05/21/19 0802   Narrative:     EXAMINATION:  XR CHEST AP PORTABLE    CLINICAL HISTORY:  PNA;    COMPARISON:  Comparison is made to 05/20/2019.    FINDINGS:  No significant interval change in the appearance of the chest since 05/20/2019 is appreciated, allowing for a poorer inspiratory depth level on the current examination.  No pneumothorax.   Impression:       As above      Electronically signed by: Christian Moreno MD  Date: 05/21/2019  Time: 08:00   X-Ray Chest AP Portable [[id number]] Resulted: 05/20/19 0717   Order Status: Completed Updated: 05/20/19 0719   Narrative:     EXAMINATION:  XR CHEST AP PORTABLE    CLINICAL HISTORY:  eval  consolidation;    TECHNIQUE:  Single frontal view of the chest was performed.    COMPARISON:  May 19, 2019.    FINDINGS:  Tracheostomy cannula monitoring leads remain.  Heart size is similar.  Increase in the pulmonary vascular and perihilar alveolar markings though some improvement in the aeration in the peripheral lung fields.   there may be some pleural fluid on the left.  No pneumothorax.   Impression:       Findings most consistent with congestive heart failure.  Aeration is improved.      Electronically signed by: Mk Reilly MD  Date: 05/20/2019  Time: 07:17   X-Ray Chest AP Portable [[id number]] Resulted: 05/19/19 1513   Order Status: Completed Updated: 05/19/19 1516   Narrative:     EXAMINATION:  XR CHEST AP PORTABLE    CLINICAL HISTORY:  respiratory distress;    TECHNIQUE:  Single frontal view of the chest was performed.    COMPARISON:  05/19/2019 at 05:11    FINDINGS:  Tracheostomy tube in the clavicular heads.    Diffuse alveolar filling process unchanged.  No pneumothorax or pleural effusion.  The cardiomediastinal silhouette, osseous and soft tissue structures are stable.   Impression:       No significant interval change in what is likely diffuse alveolar pulmonary edema.  Infection can have a similar appearance.      Electronically signed by: Sharon Bro MD  Date: 05/19/2019  Time: 15:13   X-Ray Chest AP Portable [[id number]] Resulted: 05/19/19 0558   Order Status: Completed Updated: 05/19/19 0600   Narrative:     EXAMINATION:  XR CHEST AP PORTABLE    CLINICAL HISTORY:  trach;    TECHNIQUE:  Single frontal view of the chest was performed.    COMPARISON:  05/18/2019    FINDINGS:  Tracheostomy cannula projects in stable radiographic position.  Cardiomediastinal silhouette is unchanged.  The lungs demonstrate persistent diffuse increased interstitial attenuation and perihilar opacities, similar to prior examination.  No evidence of pneumothorax.   Impression:       No significant detrimental  interval change compared to 05/18/2019.      Electronically signed by: Al Wu MD  Date: 05/19/2019  Time: 05:58   X-Ray Chest AP Portable [[id number]] Resulted: 05/18/19 0751   Order Status: Completed Updated: 05/18/19 0754   Narrative:     EXAMINATION:  XR CHEST AP PORTABLE    CLINICAL HISTORY:  trach;    TECHNIQUE:  Single frontal view of the chest was performed.    COMPARISON:  Chest AP portable dated 05/17/2019    FINDINGS:  Tracheostomy tube again noted.  Coarsened, central predominant interstitial attenuation with improved left perihilar infiltrate and new left basilar opacity.  Heart size is similar.  No pneumothorax or free air.   Impression:       As above      Electronically signed by: Tristan Flores  Date: 05/18/2019  Time: 07:51   X-Ray Chest AP Portable [[id number]] Resulted: 05/17/19 0903   Order Status: Completed Updated: 05/17/19 0905   Narrative:     EXAMINATION:  XR CHEST AP PORTABLE    CLINICAL HISTORY:  trach;    TECHNIQUE:  Single frontal view of the chest was performed.    COMPARISON:  No 05/16/2019 ne      Electronically signed by: Christian Valencia MD  Date: 05/17/2019  Time: 09:03   X-Ray Chest 1 View [[id number]] Resulted: 05/16/19 0747   Order Status: Completed Updated: 05/16/19 0750   Narrative:     EXAMINATION:  XR CHEST 1 VIEW    CLINICAL HISTORY:  mechanically assisted;    TECHNIQUE:  Single frontal view of the chest was performed.    COMPARISON:  05/15/2019    FINDINGS:  Tracheostomy tube is present vascular catheters in place.  Heart size is unchanged.  There is little infiltrate present in the perihilar area on the left.  No pneumothorax.   Impression:       See above      Electronically signed by: Florin Cooley MD  Date: 05/16/2019  Time: 07:47   X-Ray Chest 1 View [[id number]] Resulted: 05/15/19 0726   Order Status: Completed Updated: 05/15/19 0729   Narrative:     EXAMINATION:  XR CHEST 1 VIEW    CLINICAL HISTORY:  mechanically assisted;    COMPARISON:  Comparison is made  to 05/14/2019.    FINDINGS:  No significant interval change in the appearance of the chest since 05/14/2019 is appreciated, with the current examination demonstrating a slightly improved inspiratory depth level.  No pneumothorax.   Impression:       As above      Electronically signed by: Christian Moreno MD  Date: 05/15/2019  Time: 07:26

## 2019-05-22 NOTE — PROGRESS NOTES
Dr. Du at bedside, updated on VS including temp 100.8, tylenol given per MAR. Pt TV mid 150-200. MD increased PEEP to 8. Diuresing well with lasix. Chest xray worse. Will notify Dr. Du when UOP tapers to give more lasix. Patient and spouse updated on plan of care. Will continue to closely monitor.

## 2019-05-22 NOTE — PROGRESS NOTES
[facility]  Urology  Progress Note    Patient Name: Mariann Huff  MRN: [id number]  Admission Date: 4/20/2019  Hospital Length of Stay: 32 days  Code Status: Full Code   Attending Provider: Robin Boyd MD   Primary Care Physician: Jasbir Haney MD    Subjective:     HPI:  Mariann Huff is a 59 y.o. female with history of HTN, type 2 diabetes mellitus, CAD, NSTEMI, and back pain who presents to [facility] with altered mental status and sepsis. She underwent L5-S1 OLIF with NSGY on 4/12 and had intraoperative left ureteral injury with ureteroureteral anastomosis and ureteral stent placement. She did well initially and had correa and MADHURI drain removed on 4/16. She began having chills and altered mental status 2 days ago and this has progressively worsened. No complaints of pain.     She is altered and HPI is limited. In the ED she is febrile to 103 and tachycardic and pressures are low normal. WBC is 5, creatinine is 3.6 baseline 1.0, lactate is 4.6. Cath UA concerning for infection, 3+ LE, >100 WBCs, and many bacteria on microscopy.    CT and MRI abdomen both show air with minimal fluid near the surgical site with left ureter coursing through. There is air throughout the proximal collecting system which is decompressed with JJ ureteral stent in good position.    Taken for ex lap, ligation of left ureter and left neph tube placement on 4/21/19.    Interval History: No acute events. Afebrile overnight. Patient breathing comfortably on spontaneous mode on vent. Off Vanc. WBC downtrending 20 -> 16, on cefepime, rifampin, and diflucan. UOP adequate. Cr stable.    Review of Systems  Objective:     Temp:  [99.3 °F (37.4 °C)-99.8 °F (37.7 °C)] 99.6 °F (37.6 °C)  Pulse:  [78-97] 91  Resp:  [19-30] 28  SpO2:  [97 %-100 %] 100 %  BP: (131-157)/(55-85) 157/55     Body mass index is 27.88 kg/m².    Date 05/22/19 0700 - 05/23/19 0659   Shift 2472-3885 6081-4898 9882-6920 24 Hour Total   INTAKE   NG/GT 40   40   Shift  Total(mL/kg) 40(0.5)   40(0.5)   OUTPUT   Urine(mL/kg/hr) 60   60   Shift Total(mL/kg) 60(0.8)   60(0.8)   Weight (kg) 76 76 76 76     Bladder Scan Volume (mL): 27 mL (05/10/19 0846)  Post Void Cath Residual Output (mL): 27 mL (05/10/19 0846)    Drains     Drain                 Nephrostomy 04/21/19 0629 Left 8 Fr. 31 days         Gastrostomy/Enterostomy 05/02/19 1134 Percutaneous endoscopic gastrostomy (PEG) LUQ decompression;feeding 19 days         Urethral Catheter 05/16/19 1040 16 Fr. 5 days                Physical Exam   Constitutional: She appears well-developed. No distress.   HENT:   Head: Normocephalic and atraumatic.   Neck: No JVD present.   Cardiovascular: Normal rate and regular rhythm.    Pulmonary/Chest: Effort normal. No respiratory distress.   On vent   Abdominal: Soft. She exhibits no distension. There is no rebound and no guarding.   Incision c/d/i   G-tube   Genitourinary:   Genitourinary Comments: Fontenot in place draining clear yellow urine  Left neph tube with clear yellow urine   Neurological: She is alert.   Skin: Skin is warm and dry. She is not diaphoretic. No pallor.         Significant Labs:    BMP:  Recent Labs   Lab 05/20/19  1118 05/21/19  0339 05/22/19  0450    140 142   K 5.1 4.3 4.1   * 111* 112*   CO2 22* 20* 22*   BUN 45* 39* 32*   CREATININE 1.0 0.8 0.7   CALCIUM 8.3* 8.7 8.6*       CBC:   Recent Labs   Lab 05/20/19  1718 05/21/19  0339 05/22/19  0650   WBC 22.62* 20.27* 16.09*   HGB 8.3* 7.6* 7.6*   HCT 25.5* 23.9* 24.5*    226 254     Significant Imaging:  All pertinent imaging results/findings from the past 24 hours have been reviewed.                  Assessment/Plan:     * Ureteral transection of left native kidney  Mariann TRISTIN Huff is a 59 y.o. female s/p left ureteral injury on 4/12, presented with fever, AMS, and intraabdominal abscess, taken for washout and ligation of left ureter with neph tube placement on 4/21, Trach/PEG 5/2.  - on vent, spontaneous  mode; wean per SICU  - Tube feeds per SICU  - cefepime, rifampin per ID recs   - 5/13 UCx growing Candida albicans; started 2-week course of Diflucan   - 5/19 BAL growing Pseudomonas   - ID recommends reimaging patient should she continue to show signs of infection  - Maintain neph tube and Fontenot  - Urine output adequate, Cr stable and WNL  - diuresis/fluids per SICU  - bloody BMs have subsided, H&H stable; flexible sigmoidoscopy on 5/21/19 showed no signs of bleeding, Colorectal recommends full colonoscopy and EGD    Dispo: continue ICU care; plan for transfer to LTAC once stable.    Sepsis  As above        VTE Risk Mitigation (From admission, onward)        Ordered     IP VTE LOW RISK PATIENT  Once      05/08/19 1315     Place sequential compression device  Until discontinued      04/20/19 0036          Mook Ferguson MD  Urology  [facility]

## 2019-05-22 NOTE — ASSESSMENT & PLAN NOTE
58 y/o female with HTN, DMII, CAD, NSTEMI, hx of L5-S1 OLIF on 4/12 c/b intraoperative left ureteral injury s/p ureteroureteral anastomoses and ureteral stent placement presents with fevers and AMS found to have E. Coli septicemia and air and fluid within the retroperitoneal and within the left collecting system.    She underwent ex-lap and washout on 4/21. Per op note,  There were pockets of purulence evacuated. Posterior to the stent there was a pocket of purulent fluid and exposed hardware along the spinal vertebrae. The tissue along the distal and proximal end of ureter were friable and unhealthy. She underwent left nephrostomy tube placement. The stent was removed and several metal clips were placed on proximal end of the ureteral. OR cultures with Staph Lugdenensis. She has been on Cefazolin and Rifampin with a plan to complete 6 weeks.    ID re-consulted as patient developed fevers and a leukocytosis. Procalc 20. Abx broadened to Vancomycin, Cefepime, Rifampin and Fluconazole. Lines exchanged. 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  CXR with new L basilar consolidation c/f HAP.     Blood cx negative. Respiratory culture (endotracheal aspirate) - Pseudomonas. Urine cx - candida species.     Since broadening coverage her fever curve is improving. T max 100.8. WBC count trending down. Tachycardic. PE still on differential.       Plan  - Continue Cefepime x 7 days total for HAP. Would then transition back to Cefazolin plus Rifampin for prior E. Coli and Staph lugdenesis x 8 weeks total (estimated end date 6/18/19)  - Discontinue Vancomycin (no gram positive growth) and Fluconazole (received 7 days of therapy)  - If she continues to remain febrile, recommend repeat CT A/P to reassess status of fluid collection  - ID will follow.

## 2019-05-22 NOTE — PROGRESS NOTES
"[facility]  Adult Nutrition  Progress Note    SUMMARY       Recommendations  Recommendation/Intervention:   Current TF providing < 85% EEN. Recommend modifying to Glucerna 1.5 at a goal rate of 45 mL/hr - to provide 1620 kcal/day, 89g protein/day, and 820mL free fluid/day.   RD to monitor.    Goals: Patient to receive nutrition by RD follow-up  Nutrition Goal Status: goal met  Communication of RD Recs: reviewed with RN    Reason for Assessment  Reason For Assessment: RD follow-up  Diagnosis: surgery/postoperative complications(ureteral transection of L kidney)  Relevant Medical History: CAD, DM2, HTN, HLD  General Information Comments: Trach/vent. Still tolerating TF at goal rate. S/p flex sig yesterday. Physical exam remains unchanged, continues to appear nourished with no physical signs of malnutrition at this time.  Nutrition Discharge Planning: Unable to determine at this time.    Nutrition Risk Screen  Nutrition Risk Screen: tube feeding or parenteral nutrition    Nutrition/Diet History  Spiritual, Cultural Beliefs, Worship Practices, Values that Affect Care: no  Factors Affecting Nutritional Intake: NPO    Anthropometrics  Temp: 99.6 °F (37.6 °C)  Height Method: Estimated  Height: 5' 5" (165.1 cm)  Height (inches): 65 in  Weight Method: Bed Scale  Weight: 76 kg (167 lb 8.8 oz)  Weight (lb): 167.55 lb  Ideal Body Weight (IBW), Female: 125 lb  % Ideal Body Weight, Female (lb): 146.38 lb  BMI (Calculated): 30.5  BMI Grade: 30 - 34.9- obesity - grade I    Lab/Procedures/Meds  Pertinent Labs Reviewed: reviewed  Pertinent Labs Comments: Cl 112, BUN 32, Glu 112, Ca 8.6, Phos 2.5, Alb 1.5  Pertinent Medications Reviewed: reviewed  Pertinent Medications Comments: insulin, pantoprazole    Estimated/Assessed Needs  Weight Used For Calorie Calculations: 71.4 kg (157 lb 6.5 oz)(admit weight)  Energy Calorie Requirements (kcal): 1551 kcal/day  Energy Need Method: [place]  Protein Requirements: "  g/day(1.2-1.4 g/kg)  Weight Used For Protein Calculations: 71.4 kg (157 lb 6.5 oz)(admit weight)  Fluid Requirements (mL): 1 mL/kcal or per MD  Estimated Fluid Requirement Method: RDA Method  RDA Method (mL): 1551    Nutrition Prescription Ordered  Current Diet Order: NPO  Current Nutrition Support Formula Ordered: Peptamen Intense VHP  Current Nutrition Support Rate Ordered: 40 (ml)  Current Nutrition Support Frequency Ordered: mL/hr    Evaluation of Received Nutrient/Fluid Intake  Enteral Calories (kcal): 960  Enteral Protein (gm): 88  Enteral (Free Water) Fluid (mL): 806  % Kcal Needs: 62  % Protein Needs: 102  Other Fluid (mL): 1200(300mL q 6 hours)  Total Fluid Intake (mL): 2006  I/O: +1.2L x 24hrs, +14.7L since admit  Energy Calories Required: not meeting needs  Protein Required: meeting needs  Fluid Required: (per MD)  Comments: LBM 5/22  Tolerance: tolerating  % Intake of Estimated Energy Needs: 50 - 75 %  % Meal Intake: NPO    Nutrition Risk  Level of Risk/Frequency of Follow-up: low(1x/week)     Assessment and Plan  Nutrition Problem  Inadequate energy intake     Related to (etiology):   Decreased ability to consume sufficient energy     Signs and Symptoms (as evidenced by):   NPO, TF providing < 85% EEN and EPN     Interventions/Recommendations (treatment strategy):  Collaboration and referral of nutrition care     Nutrition Diagnosis Status:   Continues    Monitor and Evaluation  Food and Nutrient Intake: energy intake, enteral nutrition intake  Food and Nutrient Adminstration: enteral and parenteral nutrition administration  Anthropometric Measurements: weight, weight change  Biochemical Data, Medical Tests and Procedures: electrolyte and renal panel, gastrointestinal profile, glucose/endocrine profile, inflammatory profile  Nutrition-Focused Physical Findings: overall appearance     Nutrition Follow-Up  RD Follow-up?: Yes

## 2019-05-22 NOTE — SUBJECTIVE & OBJECTIVE
Subjective:     Interval History: NAEO, no bleeding since flex, having brown stool w/ no blood    Post-Op Info:  Procedure(s) (LRB):  SIGMOIDOSCOPY, FLEXIBLE (N/A)   1 Day Post-Op      Medications:  Continuous Infusions:  Scheduled Meds:   ceFEPime (MAXIPIME) IVPB  2 g Intravenous Q8H    chlorhexidine  15 mL Mouth/Throat BID    fluconazole 40 mg/ml  200 mg Per G Tube Daily    insulin aspart U-100  5 Units Subcutaneous Q24H    insulin aspart U-100  5 Units Subcutaneous Q24H    insulin aspart U-100  5 Units Subcutaneous Q24H    insulin aspart U-100  5 Units Subcutaneous Q24H    insulin aspart U-100  5 Units Subcutaneous Q24H    insulin aspart U-100  5 Units Subcutaneous Q24H    loperamide  2 mg Per G Tube QID    miconazole nitrate 2%   Topical (Top) BID    pantoprazole  40 mg Per G Tube Daily    rifAMpin (RIFADIN) IVPB  300 mg Intravenous Q12H    silodosin  4 mg Per G Tube Daily     PRN Meds:   acetaminophen    albuterol-ipratropium    dextrose 50%    glucagon (human recombinant)    hydrALAZINE    HYDROmorphone    hydrOXYzine    insulin aspart U-100    labetalol    magnesium sulfate IVPB    magnesium sulfate IVPB    ondansetron    oxyCODONE    promethazine (PHENERGAN) IVPB    sodium chloride 0.9%        Objective:     Vital Signs (Most Recent):  Temp: 99.6 °F (37.6 °C) (05/22/19 1100)  Pulse: 104 (05/22/19 1430)  Resp: (!) 28 (05/21/19 1500)  BP: (!) 164/70 (05/22/19 1430)  SpO2: 100 % (05/22/19 1430) Vital Signs (24h Range):  Temp:  [99.3 °F (37.4 °C)-99.8 °F (37.7 °C)] 99.6 °F (37.6 °C)  Pulse:  [] 104  Resp:  [28] 28  SpO2:  [93 %-100 %] 100 %  BP: ()/(55-94) 164/70     Intake/Output - Last 3 Shifts       05/20 0700 - 05/21 0659 05/21 0700 - 05/22 0659 05/22 0700 - 05/23 0659    I.V. (mL/kg) 656 (8.6) 145 (1.9)     Blood 350      NG/ 2020 680    IV Piggyback  1000     Total Intake(mL/kg) 1946 (25.6) 3165 (41.6) 680 (8.9)    Urine (mL/kg/hr) 1345 (0.7) 1215 (0.7) 565  (0.9)    Stool 0 0 0    Total Output 1345 1215 565    Net +601 +1950 +115           Urine Occurrence  1 x     Stool Occurrence 4 x 3 x 2 x          Physical Exam   Constitutional: No distress.   Eyes: EOM are normal.   Neck: Neck supple.   Cardiovascular: Normal rate and regular rhythm.   Pulmonary/Chest: Effort normal. No respiratory distress.   Abdominal: Soft. She exhibits no distension. There is no tenderness.   Musculoskeletal: Normal range of motion.   Neurological: She is alert.   Skin: Skin is warm and dry.       Significant Labs:  BMP (Last 3 Results):   Recent Labs   Lab 05/20/19  1118 05/21/19  0339 05/22/19  0450   * 197* 112*    140 142   K 5.1 4.3 4.1   * 111* 112*   CO2 22* 20* 22*   BUN 45* 39* 32*   CREATININE 1.0 0.8 0.7   CALCIUM 8.3* 8.7 8.6*   MG 1.8 2.0 1.8     CBC (Last 3 Results):   Recent Labs   Lab 05/20/19  1718 05/21/19  0339 05/22/19  0650   WBC 22.62* 20.27* 16.09*   RBC 2.82* 2.64* 2.65*   HGB 8.3* 7.6* 7.6*   HCT 25.5* 23.9* 24.5*    226 254   MCV 90 91 93   MCH 29.4 28.8 28.7   MCHC 32.5 31.8* 31.0*       Significant Diagnostics:  None

## 2019-05-22 NOTE — PROGRESS NOTES
Patient c/o SOB, increased WOB, anxiety, oxygen saturation 90%. RT and MD notified. Patient placed back on AC 40% 5 peep. Will continue to closely monitor.

## 2019-05-22 NOTE — PROGRESS NOTES
Wound care follow up.    Incisions to the back appear stable. Wounds smaller with less slough tissue noted. No odor or purulence seen.     Patient continues to have loose stools. Small perianal ulceration noted. Moisture dermatitis appears to be imrpoving. Barrier antifungal applied.    Patient on Immerse CALLY mattress.     Wound care to follow PRN.   Lanny Mireles RN [facility]   [phone]    Recommend:  Continue medihoney daily to back wounds  Continue barrier antifungal cream BID to the perineal area  May use triad as needed for moisture barrier   q2hour turns  Immerse CALLY mattress         05/22/19 1111        Incision/Site 05/02/19 1142 Left Back   Date First Assessed/Time First Assessed: 05/02/19 1142   Side: Left  Location: (c) Back   Wound Image    Incision WDL ex   Dressing Appearance Dry;Intact   Drainage Amount Scant   Drainage Characteristics/Odor Serosanguineous   Appearance Yellow;Fibrin   Yellow (%), Wound Tissue Color 100 %   Periwound Area Scar tissue   Wound Edges Open   Wound Length (cm) 1.2 cm   Wound Width (cm) 0.5 cm   Wound Depth (cm) 0.2 cm   Wound Volume (cm^3) 0.12 cm^3   Wound Surface Area (cm^2) 0.6 cm^2   Care Cleansed with:;Sterile normal saline   Dressing Removed;Applied;Changed;Honey;Island/border   Dressing Change Due 05/23/19        Incision/Site 05/13/19 Right Back   Date First Assessed: 05/13/19   Side: Right  Location: Back   Wound Image    Incision WDL ex   Dressing Appearance Intact   Drainage Amount Scant   Drainage Characteristics/Odor Serosanguineous   Appearance Fibrin;Slough   Yellow (%), Wound Tissue Color 100 %   Periwound Area Scar tissue   Wound Edges Open   Wound Length (cm) 1.3 cm   Wound Width (cm) 0.8 cm   Wound Depth (cm) 0.2 cm   Wound Volume (cm^3) 0.21 cm^3   Wound Surface Area (cm^2) 1.04 cm^2   Care Cleansed with:;Sterile normal saline   Dressing Removed;Applied;Changed;Island/border;Honey   Dressing Change Due 05/23/19        Wound 05/03/19 2000 Moisture  associated dermatitis lower Buttocks   Date First Assessed/Time First Assessed: 05/03/19 2000   Primary Wound Type: Moisture associated dermatitis  Side: Left  Orientation: lower  Location: Buttocks   Wound Image    Wound WDL ex   Dressing Appearance Open to air   Drainage Amount Scant   Drainage Characteristics/Odor No odor   Appearance Moist   Tissue loss description Full thickness   Red (%), Wound Tissue Color 100 %   Periwound Area Moist   Wound Edges Irregular   Wound Length (cm) 1.3 cm   Wound Width (cm) 1.3 cm   Wound Depth (cm) 0.1 cm   Wound Volume (cm^3) 0.17 cm^3   Wound Surface Area (cm^2) 1.69 cm^2   Care Cleansed with:;Other (see comments)  (wipes/ barrier antifungal cream)

## 2019-05-22 NOTE — CARE UPDATE
BG goal 140 - 180     Decrease Novolog to 4 units q 4 hrs (BG is now below goal)  Hold if TFs are stopped.    Continue to Moderate Dose SQ Insulin Correction Scale PRN for BG excursions. Patient has proven insulin resistance during admission. Cr wnl    BG monitoring q 4 hrs.     ** Please notify Endocrine for any change and/or advance in diet/TF**    ** Please call Endocrine for any BG related issues **     Discharge Recommendations:     TBD.

## 2019-05-22 NOTE — SUBJECTIVE & OBJECTIVE
Interval History/Significant Events: No more rectal bleeding overnight. Hgb stable. Doing well on PAV+. WBC coming down and AF. Vanc discontinued.    Follow-up For: Procedure(s) (LRB):  SIGMOIDOSCOPY, FLEXIBLE (N/A)    Post-Operative Day: 1 Day Post-Op    Objective:     Vital Signs (Most Recent):  Temp: 99.6 °F (37.6 °C) (05/22/19 1100)  Pulse: (!) 115 (05/22/19 1200)  Resp: (!) 28 (05/21/19 1500)  BP: (!) 201/93 (05/22/19 1200)  SpO2: 97 % (05/22/19 1200) Vital Signs (24h Range):  Temp:  [99.3 °F (37.4 °C)-99.8 °F (37.7 °C)] 99.6 °F (37.6 °C)  Pulse:  [] 115  Resp:  [25-30] 28  SpO2:  [97 %-100 %] 97 %  BP: ()/(55-93) 201/93     Weight: 76 kg (167 lb 8.8 oz)  Body mass index is 27.88 kg/m².      Intake/Output Summary (Last 24 hours) at 5/22/2019 1206  Last data filed at 5/22/2019 1100  Gross per 24 hour   Intake 2135 ml   Output 1130 ml   Net 1005 ml       Physical Exam   Constitutional: She appears well-developed and well-nourished.   HENT:   Head: Normocephalic and atraumatic.   Tracheostomy in place   Eyes: Right eye exhibits no discharge. Left eye exhibits no discharge.   Neck: Normal range of motion. Neck supple.   Cardiovascular: Normal rate and regular rhythm.   Pulmonary/Chest: Effort normal. No respiratory distress.   Abdominal: Soft.   Midline incision c/d/i.  PEG with no leak. Abdomen soft, non-distended.  Bowel sounds present   Genitourinary:   Genitourinary Comments: Nephrostomy tube in place with yellow output.  Fontenot in place  No rectal bleeding since partial colonoscopy   Musculoskeletal: Normal range of motion.   Neurological: She is alert.   Able to follow commands, denying pain.    Skin: Skin is warm and dry.   Psychiatric: She has a normal mood and affect.   Nursing note and vitals reviewed.      Vents:  Vent Mode: Spont (05/22/19 1004)  Ventilator Initiated: Yes (05/08/19 0630)  Set Rate: 20 bmp (05/21/19 1120)  Vt Set: 400 mL (05/21/19 1120)  Pressure Support: 10 cmH20 (05/19/19  1400)  PEEP/CPAP: 5 cmH20 (05/22/19 1004)  Oxygen Concentration (%): 40 (05/22/19 1200)  Peak Airway Pressure: 14 cmH2O (05/22/19 1004)  Plateau Pressure: 0 cmH20 (05/22/19 1004)  Total Ve: 8.95 mL (05/22/19 1004)  Negative Inspiratory Force (cm H2O): -18 (04/27/19 1306)  F/VT Ratio<105 (RSBI): 173.91 (05/21/19 1500)    Lines/Drains/Airways     Drain                 Nephrostomy 04/21/19 0629 Left 8 Fr. 31 days         Gastrostomy/Enterostomy 05/02/19 1134 Percutaneous endoscopic gastrostomy (PEG) LUQ decompression;feeding 20 days         Urethral Catheter 05/16/19 1040 16 Fr. 6 days          Airway                 Surgical Airway 05/02/19 1107 Shiley Cuffed 20 days          Peripheral Intravenous Line                 Midline Catheter Insertion/Assessment  - Single Lumen 05/07/19 1632 Left basilic vein (medial side of arm) 18g x 8cm 14 days         Peripheral IV - Single Lumen 05/21/19 0800 20 G Right Antecubital 1 day                Significant Labs:    CBC/Anemia Profile:  Recent Labs   Lab 05/20/19  1718 05/21/19  0339 05/22/19  0650   WBC 22.62* 20.27* 16.09*   HGB 8.3* 7.6* 7.6*   HCT 25.5* 23.9* 24.5*    226 254   MCV 90 91 93   RDW 14.9* 14.9* 14.9*        Chemistries:  Recent Labs   Lab 05/21/19  0339 05/22/19  0450    142   K 4.3 4.1   * 112*   CO2 20* 22*   BUN 39* 32*   CREATININE 0.8 0.7   CALCIUM 8.7 8.6*   ALBUMIN 1.4* 1.5*   PROT 5.5* 5.8*   BILITOT 0.6 0.6   ALKPHOS 102 116   ALT <5* 8*   AST 22 23   MG 2.0 1.8   PHOS 3.1 2.5*       All pertinent labs within the past 24 hours have been reviewed.    Significant Imaging:  I have reviewed all pertinent imaging results/findings within the past 24 hours.

## 2019-05-22 NOTE — PROGRESS NOTES
[facility]  Critical Care - Surgery  Progress Note    Patient Name: Mariann Huff  MRN: [id number]  Admission Date: 4/20/2019  Hospital Length of Stay: 32 days  Code Status: Full Code  Attending Provider: Robin Boyd MD  Primary Care Provider: Jasbir Haney MD   Principal Problem: Ureteral transection of left native kidney    Subjective:     Hospital/ICU Course:  The patient has had 2 episodes of bradycardia during the day.  Early in the morning, the patient had difficulty breathing and a mucus plug was found in the inner cannula of the tracheostomy.  She developed bradycardia but never arrested.  She also developed hypotension.  This was followed subsequently after she was bagged (Ambu) with tachypnea and hypertension and tachycardia.  Oxygen saturations were in the low 90s, and the arterial blood gas at that time revealed a significant alveolar arterial gradient with adequate ventilation.  Chest x-ray, which I personally reviewed, revealed what appeared to be a significant fluid overload.  We did perform an echocardiogram urgently, which I personally was present and reviewed the results.  There is no evidence of heart failure.  EKG was reported as normal and there is no evidence that the patient had an acute myocardial event.  The chest x-ray did suggest a significant amount of pulmonary edema. There was also a concern, because the the history of upper body deep venous thrombosis, that this could be a pulmonary embolism.  A CT scan of the chest , which I have personally reviewed show bilateral infiltrates compatible with pulmonary edema of non cardiogenic origin.  It was not possible to rule out a pulmonary embolism.  The patient is anticoagulated currently.  Ultrasound of the lower extremities was done and they do not show any deep venous thrombosis.    The patient did receive some fluids, because of her history of acute kidney injury, which has resolved and because she was receiving IV  contrast.  She has maintained a good urinary output during the day.  She has mostly being hemodynamically stable and her tachycardia resolved.  She remains sedated and on the ventilator.  Follow-up chest x-ray, which I personally reviewed, showed resolving pulmonary edema. Her oxygenation and oxygen saturations improved and we were able to wean the patient FiO2 down to 50%.    Of note, I did perform a bronchoscopy, which failed to reveal any amount of pus or mucus plugging.  There was also no evidence of aspiration.  BAL was performed and was sent.    A 2nd episode of bradycardia was observed in the afternoon.  This responded to the use of atropine and 0.1 mg of epinephrine.  She did have a tachycardic response with this which subsided rapidly.  She remained otherwise hemodynamically stable.  Arterial blood gases, reveal now a PO2 of 300 with adequate pCO2 and pH.  Mild hyperventilation.  She remains comfortable.  I have asked Cardiology to re-evaluate this patient.  For now she remains off pressors.    Neurologically the patient has been intact. She is awake alert and oriented with a nonfocal exam.    Her abdomen remains soft.  She has been NPO for now.  She has not had any further episodes of lower GI bleed.    The patient is having a good urinary output BUN and creatinine have been grossly within normal limits with a slight elevation of BUN.  Electrolytes are being followed and replaced as necessary.    Currently no evidence of infection in this patient.  Empirically I started antibiotics, but will stop them in the next 24-48 hours.    I have had the chance to talk to the patient's  at length and in detail.  I have explained our findings, I will keep him up-to-date as to any progress in understanding the etiology of these episodes and will wait continuing to talk to him as often as necessary.      Interval History/Significant Events: No more rectal bleeding overnight. Hgb stable. Doing well on PAV+. WBC  coming down and AF. Vanc discontinued.    Follow-up For: Procedure(s) (LRB):  SIGMOIDOSCOPY, FLEXIBLE (N/A)    Post-Operative Day: 1 Day Post-Op    Objective:     Vital Signs (Most Recent):  Temp: 99.6 °F (37.6 °C) (05/22/19 1100)  Pulse: (!) 115 (05/22/19 1200)  Resp: (!) 28 (05/21/19 1500)  BP: (!) 201/93 (05/22/19 1200)  SpO2: 97 % (05/22/19 1200) Vital Signs (24h Range):  Temp:  [99.3 °F (37.4 °C)-99.8 °F (37.7 °C)] 99.6 °F (37.6 °C)  Pulse:  [] 115  Resp:  [25-30] 28  SpO2:  [97 %-100 %] 97 %  BP: ()/(55-93) 201/93     Weight: 76 kg (167 lb 8.8 oz)  Body mass index is 27.88 kg/m².      Intake/Output Summary (Last 24 hours) at 5/22/2019 1206  Last data filed at 5/22/2019 1100  Gross per 24 hour   Intake 2135 ml   Output 1130 ml   Net 1005 ml       Physical Exam   Constitutional: She appears well-developed and well-nourished.   HENT:   Head: Normocephalic and atraumatic.   Tracheostomy in place   Eyes: Right eye exhibits no discharge. Left eye exhibits no discharge.   Neck: Normal range of motion. Neck supple.   Cardiovascular: Normal rate and regular rhythm.   Pulmonary/Chest: Effort normal. No respiratory distress.   Abdominal: Soft.   Midline incision c/d/i.  PEG with no leak. Abdomen soft, non-distended.  Bowel sounds present   Genitourinary:   Genitourinary Comments: Nephrostomy tube in place with yellow output.  Fontenot in place  No rectal bleeding since partial colonoscopy   Musculoskeletal: Normal range of motion.   Neurological: She is alert.   Able to follow commands, denying pain.    Skin: Skin is warm and dry.   Psychiatric: She has a normal mood and affect.   Nursing note and vitals reviewed.      Vents:  Vent Mode: Spont (05/22/19 1004)  Ventilator Initiated: Yes (05/08/19 0630)  Set Rate: 20 bmp (05/21/19 1120)  Vt Set: 400 mL (05/21/19 1120)  Pressure Support: 10 cmH20 (05/19/19 1400)  PEEP/CPAP: 5 cmH20 (05/22/19 1004)  Oxygen Concentration (%): 40 (05/22/19 1200)  Peak Airway Pressure:  14 cmH2O (05/22/19 1004)  Plateau Pressure: 0 cmH20 (05/22/19 1004)  Total Ve: 8.95 mL (05/22/19 1004)  Negative Inspiratory Force (cm H2O): -18 (04/27/19 1306)  F/VT Ratio<105 (RSBI): 173.91 (05/21/19 1500)    Lines/Drains/Airways     Drain                 Nephrostomy 04/21/19 0629 Left 8 Fr. 31 days         Gastrostomy/Enterostomy 05/02/19 1134 Percutaneous endoscopic gastrostomy (PEG) LUQ decompression;feeding 20 days         Urethral Catheter 05/16/19 1040 16 Fr. 6 days          Airway                 Surgical Airway 05/02/19 1107 Shiley Cuffed 20 days          Peripheral Intravenous Line                 Midline Catheter Insertion/Assessment  - Single Lumen 05/07/19 1632 Left basilic vein (medial side of arm) 18g x 8cm 14 days         Peripheral IV - Single Lumen 05/21/19 0800 20 G Right Antecubital 1 day                Significant Labs:    CBC/Anemia Profile:  Recent Labs   Lab 05/20/19  1718 05/21/19  0339 05/22/19  0650   WBC 22.62* 20.27* 16.09*   HGB 8.3* 7.6* 7.6*   HCT 25.5* 23.9* 24.5*    226 254   MCV 90 91 93   RDW 14.9* 14.9* 14.9*        Chemistries:  Recent Labs   Lab 05/21/19  0339 05/22/19  0450    142   K 4.3 4.1   * 112*   CO2 20* 22*   BUN 39* 32*   CREATININE 0.8 0.7   CALCIUM 8.7 8.6*   ALBUMIN 1.4* 1.5*   PROT 5.5* 5.8*   BILITOT 0.6 0.6   ALKPHOS 102 116   ALT <5* 8*   AST 22 23   MG 2.0 1.8   PHOS 3.1 2.5*       All pertinent labs within the past 24 hours have been reviewed.    Significant Imaging:  I have reviewed all pertinent imaging results/findings within the past 24 hours.    Assessment/Plan:     * Ureteral transection of left native kidney  ASSESSMENT/PLAN:   Mariann Huff is a 59 y.o. female s/p left ureteral injury on 4/12, who presented with fever, AMS, and intraabdominal abscess, taken for washout and ligation of left ureter with nephrostomy tube placement on 4/21. S/p Trach/PEG on 5/2. Episode of negative pressure pulmonary edema on 5/8 requiring  mechanical ventilation, diuresis and low dose pressor. Has been plagued by intermittent BRBPR.     Neuro:   - off sedation  - responds to questions appropriately by nodding  - no focal deficit     Pulmonary:   PAV+; try trach collar today  - CXR pending this AM  - ABGs prn  - diuresis as tolerates     Cardiac:   - alternating HTN and hypotension  - TTE 5/8 with no evidence of right heart strain or significant valvular pathology, normal LV systolic function, EF 70%     Renal:    - S/p transection of left ureter 4/12 and subsequent ligation and PCT nephrostomy tube placement with IR 4/21  - Fontenot removed 5/15, but then replaced for retention   - UO adequate  - BUN/Cr stable  - Monitor I/Os        Intake/Output Summary (Last 24 hours) at 5/22/2019 1209  Last data filed at 5/22/2019 1100  Gross per 24 hour   Intake 2135 ml   Output 1130 ml   Net 1005 ml        ID:   - AF  - EBC down  - Blood 5/10 NGTD  - Ucx 5/13 candiduria  - BAL 5/18 pseudomonas  - C diff 5/5 negative  - cefepime and rifampin (spine hardware)  - vanc stopped     Hem/Onc:   - stable  - check Hgb prn for bleeding     Endocrine:  - DM Type 2  - TF @ goal  - LDSSI  - Endocrine following for assistance with blood glucose control.      Fluids/Electrolytes/Nutrition/GI:   - Free water flushes 300 cc q6h  - Replace electrolytes PRN     # rectal ulcers  - intermittent hematochezia; ulcers seen to be improving on partial colonoscopy 5/21  - no bleeding in last 24 hours  - anoscopy and hemostatic agent applied by CRS 5/15  - imodium QID     PPx:  - DVT: Lovenox, Hep gtt held for continued bloody BMs        DISPO:  - Continue SICU care  - Preparing for LTACH once bloody BMs are managed          Critical secondary to Patient has a condition that poses threat to life and bodily function: Severe Respiratory Distress and rectal bleeding     Critical care was time spent personally by me on the following activities: development of treatment plan with patient or  surrogate and bedside caregivers, discussions with consultants, evaluation of patient's response to treatment, examination of patient, ordering and performing treatments and interventions, ordering and review of laboratory studies, ordering and review of radiographic studies, pulse oximetry, re-evaluation of patient's condition.  This critical care time did not overlap with that of any other provider or involve time for any procedures.     ISABELLE Carter MD  Critical Care - Surgery  [facility]

## 2019-05-23 ENCOUNTER — ANESTHESIA EVENT (OUTPATIENT)
Dept: ENDOSCOPY | Facility: HOSPITAL | Age: 59
DRG: 003 | End: 2019-05-23
Payer: MEDICARE

## 2019-05-23 LAB
ALBUMIN SERPL BCP-MCNC: 1.4 G/DL (ref 3.5–5.2)
ALP SERPL-CCNC: 118 U/L (ref 55–135)
ALT SERPL W/O P-5'-P-CCNC: 8 U/L (ref 10–44)
ANION GAP SERPL CALC-SCNC: 6 MMOL/L (ref 8–16)
AST SERPL-CCNC: 19 U/L (ref 10–40)
BASOPHILS # BLD AUTO: 0.06 K/UL (ref 0–0.2)
BASOPHILS # BLD AUTO: 0.07 K/UL (ref 0–0.2)
BASOPHILS NFR BLD: 0.4 % (ref 0–1.9)
BASOPHILS NFR BLD: 0.5 % (ref 0–1.9)
BILIRUB SERPL-MCNC: 0.6 MG/DL (ref 0.1–1)
BLD PROD TYP BPU: NORMAL
BLD PROD TYP BPU: NORMAL
BLOOD UNIT EXPIRATION DATE: NORMAL
BLOOD UNIT EXPIRATION DATE: NORMAL
BLOOD UNIT TYPE CODE: 5100
BLOOD UNIT TYPE CODE: 5100
BLOOD UNIT TYPE: NORMAL
BLOOD UNIT TYPE: NORMAL
BUN SERPL-MCNC: 38 MG/DL (ref 6–20)
CALCIUM SERPL-MCNC: 8.7 MG/DL (ref 8.7–10.5)
CHLORIDE SERPL-SCNC: 109 MMOL/L (ref 95–110)
CO2 SERPL-SCNC: 23 MMOL/L (ref 23–29)
CODING SYSTEM: NORMAL
CODING SYSTEM: NORMAL
CREAT SERPL-MCNC: 1.2 MG/DL (ref 0.5–1.4)
DIFFERENTIAL METHOD: ABNORMAL
DIFFERENTIAL METHOD: ABNORMAL
DISPENSE STATUS: NORMAL
DISPENSE STATUS: NORMAL
EOSINOPHIL # BLD AUTO: 0.6 K/UL (ref 0–0.5)
EOSINOPHIL # BLD AUTO: 0.8 K/UL (ref 0–0.5)
EOSINOPHIL NFR BLD: 4.5 % (ref 0–8)
EOSINOPHIL NFR BLD: 4.6 % (ref 0–8)
ERYTHROCYTE [DISTWIDTH] IN BLOOD BY AUTOMATED COUNT: 14.7 % (ref 11.5–14.5)
ERYTHROCYTE [DISTWIDTH] IN BLOOD BY AUTOMATED COUNT: 14.7 % (ref 11.5–14.5)
EST. GFR  (AFRICAN AMERICAN): 57.6 ML/MIN/1.73 M^2
EST. GFR  (NON AFRICAN AMERICAN): 49.9 ML/MIN/1.73 M^2
GLUCOSE SERPL-MCNC: 115 MG/DL (ref 70–110)
HCT VFR BLD AUTO: 21.7 % (ref 37–48.5)
HCT VFR BLD AUTO: 26 % (ref 37–48.5)
HGB BLD-MCNC: 6.8 G/DL (ref 12–16)
HGB BLD-MCNC: 8.4 G/DL (ref 12–16)
IMM GRANULOCYTES # BLD AUTO: 0.14 K/UL (ref 0–0.04)
IMM GRANULOCYTES # BLD AUTO: 0.17 K/UL (ref 0–0.04)
IMM GRANULOCYTES NFR BLD AUTO: 1 % (ref 0–0.5)
IMM GRANULOCYTES NFR BLD AUTO: 1 % (ref 0–0.5)
INR PPP: 1.1 (ref 0.8–1.2)
LYMPHOCYTES # BLD AUTO: 1.3 K/UL (ref 1–4.8)
LYMPHOCYTES # BLD AUTO: 1.9 K/UL (ref 1–4.8)
LYMPHOCYTES NFR BLD: 11.2 % (ref 18–48)
LYMPHOCYTES NFR BLD: 8.9 % (ref 18–48)
MAGNESIUM SERPL-MCNC: 1.6 MG/DL (ref 1.6–2.6)
MCH RBC QN AUTO: 28.6 PG (ref 27–31)
MCH RBC QN AUTO: 29.2 PG (ref 27–31)
MCHC RBC AUTO-ENTMCNC: 31.3 G/DL (ref 32–36)
MCHC RBC AUTO-ENTMCNC: 32.3 G/DL (ref 32–36)
MCV RBC AUTO: 90 FL (ref 82–98)
MCV RBC AUTO: 91 FL (ref 82–98)
MONOCYTES # BLD AUTO: 1.2 K/UL (ref 0.3–1)
MONOCYTES # BLD AUTO: 1.5 K/UL (ref 0.3–1)
MONOCYTES NFR BLD: 8.2 % (ref 4–15)
MONOCYTES NFR BLD: 9 % (ref 4–15)
NEUTROPHILS # BLD AUTO: 10.9 K/UL (ref 1.8–7.7)
NEUTROPHILS # BLD AUTO: 12.3 K/UL (ref 1.8–7.7)
NEUTROPHILS NFR BLD: 73.8 % (ref 38–73)
NEUTROPHILS NFR BLD: 76.9 % (ref 38–73)
NRBC BLD-RTO: 0 /100 WBC
NRBC BLD-RTO: 0 /100 WBC
PHOSPHATE SERPL-MCNC: 3.4 MG/DL (ref 2.7–4.5)
PLATELET # BLD AUTO: 217 K/UL (ref 150–350)
PLATELET # BLD AUTO: 246 K/UL (ref 150–350)
PMV BLD AUTO: 10.5 FL (ref 9.2–12.9)
PMV BLD AUTO: 11 FL (ref 9.2–12.9)
POCT GLUCOSE: 102 MG/DL (ref 70–110)
POCT GLUCOSE: 106 MG/DL (ref 70–110)
POCT GLUCOSE: 108 MG/DL (ref 70–110)
POCT GLUCOSE: 127 MG/DL (ref 70–110)
POCT GLUCOSE: 128 MG/DL (ref 70–110)
POCT GLUCOSE: 137 MG/DL (ref 70–110)
POCT GLUCOSE: 159 MG/DL (ref 70–110)
POTASSIUM SERPL-SCNC: 4.4 MMOL/L (ref 3.5–5.1)
PROT SERPL-MCNC: 5.9 G/DL (ref 6–8.4)
PROTHROMBIN TIME: 11 SEC (ref 9–12.5)
RBC # BLD AUTO: 2.38 M/UL (ref 4–5.4)
RBC # BLD AUTO: 2.88 M/UL (ref 4–5.4)
SODIUM SERPL-SCNC: 138 MMOL/L (ref 136–145)
TRANS ERYTHROCYTES VOL PATIENT: NORMAL ML
TRANS ERYTHROCYTES VOL PATIENT: NORMAL ML
WBC # BLD AUTO: 14.12 K/UL (ref 3.9–12.7)
WBC # BLD AUTO: 16.64 K/UL (ref 3.9–12.7)

## 2019-05-23 PROCEDURE — 94003 VENT MGMT INPAT SUBQ DAY: CPT

## 2019-05-23 PROCEDURE — 27100092 HC HIGH FLOW DELIVERY CANNULA

## 2019-05-23 PROCEDURE — 63600175 PHARM REV CODE 636 W HCPCS: Performed by: STUDENT IN AN ORGANIZED HEALTH CARE EDUCATION/TRAINING PROGRAM

## 2019-05-23 PROCEDURE — 25000003 PHARM REV CODE 250: Performed by: STUDENT IN AN ORGANIZED HEALTH CARE EDUCATION/TRAINING PROGRAM

## 2019-05-23 PROCEDURE — P9021 RED BLOOD CELLS UNIT: HCPCS

## 2019-05-23 PROCEDURE — 99232 PR SUBSEQUENT HOSPITAL CARE,LEVL II: ICD-10-PCS | Mod: ,,, | Performed by: PHYSICIAN ASSISTANT

## 2019-05-23 PROCEDURE — 27000221 HC OXYGEN, UP TO 24 HOURS

## 2019-05-23 PROCEDURE — 83735 ASSAY OF MAGNESIUM: CPT

## 2019-05-23 PROCEDURE — 99232 PR SUBSEQUENT HOSPITAL CARE,LEVL II: ICD-10-PCS | Mod: ,,, | Performed by: NURSE PRACTITIONER

## 2019-05-23 PROCEDURE — 99900035 HC TECH TIME PER 15 MIN (STAT)

## 2019-05-23 PROCEDURE — 99232 SBSQ HOSP IP/OBS MODERATE 35: CPT | Mod: ,,, | Performed by: PHYSICIAN ASSISTANT

## 2019-05-23 PROCEDURE — 20000000 HC ICU ROOM

## 2019-05-23 PROCEDURE — 85610 PROTHROMBIN TIME: CPT

## 2019-05-23 PROCEDURE — 99232 SBSQ HOSP IP/OBS MODERATE 35: CPT | Mod: ,,, | Performed by: NURSE PRACTITIONER

## 2019-05-23 PROCEDURE — 84100 ASSAY OF PHOSPHORUS: CPT

## 2019-05-23 PROCEDURE — 85025 COMPLETE CBC W/AUTO DIFF WBC: CPT

## 2019-05-23 PROCEDURE — 94640 AIRWAY INHALATION TREATMENT: CPT

## 2019-05-23 PROCEDURE — 94770 HC EXHALED C02 TEST: CPT

## 2019-05-23 PROCEDURE — 27100171 HC OXYGEN HIGH FLOW UP TO 24 HOURS

## 2019-05-23 PROCEDURE — 94761 N-INVAS EAR/PLS OXIMETRY MLT: CPT

## 2019-05-23 PROCEDURE — 99900026 HC AIRWAY MAINTENANCE (STAT)

## 2019-05-23 PROCEDURE — 36415 COLL VENOUS BLD VENIPUNCTURE: CPT

## 2019-05-23 PROCEDURE — 99233 PR SUBSEQUENT HOSPITAL CARE,LEVL III: ICD-10-PCS | Mod: ,,, | Performed by: SURGERY

## 2019-05-23 PROCEDURE — 99233 SBSQ HOSP IP/OBS HIGH 50: CPT | Mod: ,,, | Performed by: SURGERY

## 2019-05-23 PROCEDURE — 80053 COMPREHEN METABOLIC PANEL: CPT

## 2019-05-23 RX ORDER — INSULIN ASPART 100 [IU]/ML
3 INJECTION, SOLUTION INTRAVENOUS; SUBCUTANEOUS
Status: DISCONTINUED | OUTPATIENT
Start: 2019-05-24 | End: 2019-05-24

## 2019-05-23 RX ORDER — POLYETHYLENE GLYCOL 3350, SODIUM SULFATE ANHYDROUS, SODIUM BICARBONATE, SODIUM CHLORIDE, POTASSIUM CHLORIDE 236; 22.74; 6.74; 5.86; 2.97 G/4L; G/4L; G/4L; G/4L; G/4L
2000 POWDER, FOR SOLUTION ORAL ONCE
Status: COMPLETED | OUTPATIENT
Start: 2019-05-23 | End: 2019-05-23

## 2019-05-23 RX ORDER — CEFEPIME HYDROCHLORIDE 2 G/1
2 INJECTION, POWDER, FOR SOLUTION INTRAVENOUS
Status: COMPLETED | OUTPATIENT
Start: 2019-05-23 | End: 2019-05-25

## 2019-05-23 RX ORDER — POLYETHYLENE GLYCOL 3350, SODIUM SULFATE ANHYDROUS, SODIUM BICARBONATE, SODIUM CHLORIDE, POTASSIUM CHLORIDE 236; 22.74; 6.74; 5.86; 2.97 G/4L; G/4L; G/4L; G/4L; G/4L
2000 POWDER, FOR SOLUTION ORAL ONCE
Status: DISCONTINUED | OUTPATIENT
Start: 2019-05-23 | End: 2019-05-23

## 2019-05-23 RX ORDER — SCOLOPAMINE TRANSDERMAL SYSTEM 1 MG/1
1 PATCH, EXTENDED RELEASE TRANSDERMAL
Status: DISCONTINUED | OUTPATIENT
Start: 2019-05-23 | End: 2019-06-05 | Stop reason: HOSPADM

## 2019-05-23 RX ORDER — HYDROCODONE BITARTRATE AND ACETAMINOPHEN 500; 5 MG/1; MG/1
TABLET ORAL
Status: DISCONTINUED | OUTPATIENT
Start: 2019-05-23 | End: 2019-06-05 | Stop reason: HOSPADM

## 2019-05-23 RX ORDER — INSULIN ASPART 100 [IU]/ML
3 INJECTION, SOLUTION INTRAVENOUS; SUBCUTANEOUS
Status: DISCONTINUED | OUTPATIENT
Start: 2019-05-23 | End: 2019-05-24

## 2019-05-23 RX ORDER — CEFAZOLIN SODIUM 1 G/3ML
2 INJECTION, POWDER, FOR SOLUTION INTRAMUSCULAR; INTRAVENOUS
Status: DISCONTINUED | OUTPATIENT
Start: 2019-05-26 | End: 2019-06-05 | Stop reason: HOSPADM

## 2019-05-23 RX ORDER — POLYETHYLENE GLYCOL 3350, SODIUM SULFATE ANHYDROUS, SODIUM BICARBONATE, SODIUM CHLORIDE, POTASSIUM CHLORIDE 236; 22.74; 6.74; 5.86; 2.97 G/4L; G/4L; G/4L; G/4L; G/4L
2000 POWDER, FOR SOLUTION ORAL ONCE
Status: COMPLETED | OUTPATIENT
Start: 2019-05-24 | End: 2019-05-24

## 2019-05-23 RX ADMIN — MAGNESIUM SULFATE IN WATER 2 G: 40 INJECTION, SOLUTION INTRAVENOUS at 05:05

## 2019-05-23 RX ADMIN — INSULIN ASPART 4 UNITS: 100 INJECTION, SOLUTION INTRAVENOUS; SUBCUTANEOUS at 08:05

## 2019-05-23 RX ADMIN — SILODOSIN 4 MG: 4 CAPSULE ORAL at 08:05

## 2019-05-23 RX ADMIN — CHLORHEXIDINE GLUCONATE 0.12% ORAL RINSE 15 ML: 1.2 LIQUID ORAL at 08:05

## 2019-05-23 RX ADMIN — CEFEPIME 2 G: 2 INJECTION, POWDER, FOR SOLUTION INTRAVENOUS at 12:05

## 2019-05-23 RX ADMIN — CEFEPIME 2 G: 2 INJECTION, POWDER, FOR SOLUTION INTRAVENOUS at 08:05

## 2019-05-23 RX ADMIN — INSULIN ASPART 4 UNITS: 100 INJECTION, SOLUTION INTRAVENOUS; SUBCUTANEOUS at 12:05

## 2019-05-23 RX ADMIN — MICONAZOLE NITRATE: 20 OINTMENT TOPICAL at 08:05

## 2019-05-23 RX ADMIN — PANTOPRAZOLE SODIUM 40 MG: 40 GRANULE, DELAYED RELEASE ORAL at 09:05

## 2019-05-23 RX ADMIN — Medication 2 MG: at 12:05

## 2019-05-23 RX ADMIN — RIFAMPIN 300 MG: 600 INJECTION, POWDER, LYOPHILIZED, FOR SOLUTION INTRAVENOUS at 02:05

## 2019-05-23 RX ADMIN — CEFEPIME 2 G: 2 INJECTION, POWDER, FOR SOLUTION INTRAVENOUS at 03:05

## 2019-05-23 RX ADMIN — OXYCODONE HYDROCHLORIDE 5 MG: 5 TABLET ORAL at 04:05

## 2019-05-23 RX ADMIN — FLUCONAZOLE 800 MG: 200 TABLET ORAL at 09:05

## 2019-05-23 RX ADMIN — Medication 2 MG: at 08:05

## 2019-05-23 RX ADMIN — SCOPALAMINE 1 PATCH: 1 PATCH, EXTENDED RELEASE TRANSDERMAL at 09:05

## 2019-05-23 RX ADMIN — RIFAMPIN 300 MG: 600 INJECTION, POWDER, LYOPHILIZED, FOR SOLUTION INTRAVENOUS at 03:05

## 2019-05-23 RX ADMIN — POLYETHYLENE GLYCOL 3350, SODIUM SULFATE ANHYDROUS, SODIUM BICARBONATE, SODIUM CHLORIDE, POTASSIUM CHLORIDE 2000 ML: 236; 22.74; 6.74; 5.86; 2.97 POWDER, FOR SOLUTION ORAL at 12:05

## 2019-05-23 NOTE — ASSESSMENT & PLAN NOTE
Patient is a 60 y/o female with complicated admission due to ureteral injury, currently with recurrent BRBPR.   Underwent flex sig x2 with rectal ulcer seen but no active bleeding, and per CRS team this was not thought to be cause of bleeding.  Consideration for EGD/Colonscopy was recommended.  Patient's bleeding is highly unlikely to be due to UGI bleeding. Likely source is colon, suspect ulcer and rectal trauma as cause of bleeding. Will continue to monitor to determine need for colonoscopy, particularly as colonoscopy prep will be challenging in this patient who is bed bound on a ventilator.    Continue to monitor for recurrent bleeding  Trend H/H and transfuse as needed  Continue to hold heparin  Will continue to follow

## 2019-05-23 NOTE — SUBJECTIVE & OBJECTIVE
Past Medical History:   Diagnosis Date    Anticoagulant long-term use     Asthma     Back pain     Bradycardia, unspecified 2019    The etiology of the bradycardic episode is unclear.  The have appear to be respiratory in origin (at least the 1st episode).  We will continue to monitor carefully.  We are awaiting evaluation by Cardiology.      CAD (coronary artery disease)     s/p stentimg  (2), (1)    Carotid artery stenosis     Diabetes mellitus type 2 in obese     HTN (hypertension), benign     Hyperlipidemia     Keloid cicatrix     NPDR (nonproliferative diabetic retinopathy) 2015    NSTEMI (non-ST elevated myocardial infarction)     Nuclear sclerosis - Right Eye 3/18/2014    Primary localized osteoarthrosis, lower leg 2014    Sleep apnea        Past Surgical History:   Procedure Laterality Date    BRONCHOSCOPY N/A 2019    Performed by Sean [facility]ano MD at [facility] OR 2ND FLR    CARDIAC CATHETERIZATION      cataract extraction left eye      cataracts      CATHETERIZATION, HEART, LEFT Left 2014    Performed by Wilman Kim MD at [facility] CATH LAB     SECTION, LOW TRANSVERSE      CORONARY ANGIOPLASTY      Creation, Nephrostomy, Percutaneous Left 2019    Performed by Karina Surgeon at [facility] KARINA    CREATION, TRACHEOSTOMY N/A 2019    Performed by Sean [facility]ano MD at [facility] OR 2ND FLR    EGD, WITH PEG TUBE INSERTION  2019    Performed by Sean [facility]ano MD at [facility] OR 2ND FLR    ESOPHAGOGASTRODUODENOSCOPY (EGD) N/A 2016    Performed by Gardenia Adamson MD at [facility] ENDO (4TH FLR)    EXCISION TURBINATE, SUBMUCOUS      FUSION, SPINE, LUMBAR, ANTERIOR APPROACH Left L5-S1 Anterior to Psoa Interbody Fusion, L5-S1 Posterior Instrumentation Left 2019    Performed by Mk George MD at [facility] OR 2ND FLR    HAND SURGERY Left     HAND SURGERY Right     torn ligament repair/ Dr. Yeboah    HYSTERECTOMY       Injection,steroid,epidural,transforaminal approach - Bilateral - S1 Bilateral 9/25/2018    Performed by Tl Abreu MD at [facility] PAIN MGT    Injection-steroid-epidural-caudal N/A 2/7/2019    Performed by Dave Bentley Jr., MD at [facility] PAIN MGT    INSERTION-INTRAOCULAR LENS (IOL) Right 9/1/2015    Performed by Good Domingo MD at [facility] OR 1ST FLR    LAPAROTOMY, EXPLORATORY; LIGATION OF LEFT URETER; DOUBLE J STENT REMOVAL LEFT URETER Left 4/20/2019    Performed by Paul Carlson MD at [facility] OR 2ND FLR    left foot surgery      left wrist surgery      NASAL SEPTUM SURGERY  5/7/15    PHACOEMULSIFICATION-ASPIRATION-CATARACT Right 9/1/2015    Performed by Good Domingo MD at [facility] OR 1ST FLR    REPAIR, URETER  4/12/2019    Performed by Mk George MD at [facility] OR 2ND FLR    RESECTION-TURBINATES (SMR) N/A 5/7/2015    Performed by Dileep Dubois III, MD at [facility] OR 2ND FLR    rt elbow surgery      S/P LAD COATED STENT  05/14/2010    6 total     S/P OM1 STENT  08/2003    SEPTOPLASTY N/A 5/7/2015    Performed by Dileep Dubois III, MD at [facility] OR 2ND FLR    SIGMOIDOSCOPY, FLEXIBLE N/A 5/21/2019    Performed by ALBERTO Amin MD at [facility] ENDO (2ND FLR)    SIGMOIDOSCOPY, FLEXIBLE N/A 5/13/2019    Performed by ALBERTO Amin MD at [facility] ENDO (2ND FLR)    SINUS SURGERY      F.E.S.S.    SINUS SURGERY FUNCTIONAL ENDOSCOPIC WITH NAVIGATION WITH MAXILLARIES, ETHMOIDS, LEFT SPHENOID, LEFT LOLY N/A 5/7/2015    Performed by Dileep Dubois III, MD at [facility] OR 2ND FLR    STENT, URETERAL placement  4/12/2019    Performed by Robin Boyd MD at [facility] OR 2ND FLR    TUBAL LIGATION         Review of patient's allergies indicates:  No Known Allergies  Family History     Problem Relation (Age of Onset)    Diabetes Mother, Father, Sister, Brother, Sister    Heart attack Father    Heart disease Mother    Leukemia Father    No Known Problems Sister, Brother, Brother, Maternal Grandmother,  Maternal Grandfather, Paternal Grandmother, Paternal Grandfather, Son, Son, Maternal Aunt, Maternal Uncle, Paternal Aunt, Paternal Uncle        Tobacco Use    Smoking status: Never Smoker    Smokeless tobacco: Never Used   Substance and Sexual Activity    Alcohol use: No     Alcohol/week: 0.0 oz    Drug use: No    Sexual activity: Yes     Partners: Male     Birth control/protection: Post-menopausal     Comment:      Review of Systems   Unable to perform ROS: Other     Objective:     Vital Signs (Most Recent):  Temp: 98.3 °F (36.8 °C) (05/22/19 1900)  Pulse: 96 (05/22/19 2118)  Resp: (!) 28 (05/21/19 1500)  BP: (!) 142/64 (05/22/19 2100)  SpO2: 100 % (05/22/19 2118) Vital Signs (24h Range):  Temp:  [98.3 °F (36.8 °C)-100.8 °F (38.2 °C)] 98.3 °F (36.8 °C)  Pulse:  [] 96  SpO2:  [90 %-100 %] 100 %  BP: ()/(55-94) 142/64     Weight: 76 kg (167 lb 8.8 oz) (05/17/19 0500)  Body mass index is 27.88 kg/m².      Intake/Output Summary (Last 24 hours) at 5/22/2019 2148  Last data filed at 5/22/2019 2100  Gross per 24 hour   Intake 2550 ml   Output 2440 ml   Net 110 ml       Lines/Drains/Airways     Drain                 Nephrostomy 04/21/19 0629 Left 8 Fr. 31 days         Gastrostomy/Enterostomy 05/02/19 1134 Percutaneous endoscopic gastrostomy (PEG) LUQ decompression;feeding 20 days         Urethral Catheter 05/16/19 1040 16 Fr. 6 days          Airway                 Surgical Airway 05/02/19 1107 Shiley Cuffed 20 days          Peripheral Intravenous Line                 Midline Catheter Insertion/Assessment  - Single Lumen 05/07/19 1632 Left basilic vein (medial side of arm) 18g x 8cm 15 days         Peripheral IV - Single Lumen 05/21/19 0800 20 G Right Antecubital 1 day                Physical Exam   Constitutional: She is oriented to person, place, and time. She appears ill. No distress.   Tracheostomy in place.   HENT:   Head: Normocephalic.   Eyes: Conjunctivae are normal. No scleral icterus.    Neck: Normal range of motion. Neck supple.   Cardiovascular: Normal rate and regular rhythm.   Pulmonary/Chest: Breath sounds normal.   Trachestomy on vent   Abdominal: Soft. Bowel sounds are normal. She exhibits no distension and no mass. There is no tenderness. There is no rebound and no guarding.   Red blood in BM with small amounts of brown stool   Musculoskeletal: Normal range of motion.   Neurological: She is alert and oriented to person, place, and time.   Skin: Skin is warm and dry. There is pallor.   Psychiatric: She has a normal mood and affect.       Significant Labs:  CBC:   Recent Labs   Lab 05/21/19  0339 05/22/19  0650 05/22/19  1815   WBC 20.27* 16.09* 18.30*   HGB 7.6* 7.6* 8.4*   HCT 23.9* 24.5* 27.0*    254 267     BMP:   Recent Labs   Lab 05/22/19  0450   *      K 4.1   *   CO2 22*   BUN 32*   CREATININE 0.7   CALCIUM 8.6*   MG 1.8     CMP:   Recent Labs   Lab 05/22/19  0450   *   CALCIUM 8.6*   ALBUMIN 1.5*   PROT 5.8*      K 4.1   CO2 22*   *   BUN 32*   CREATININE 0.7   ALKPHOS 116   ALT 8*   AST 23   BILITOT 0.6     Coagulation:   Recent Labs   Lab 05/22/19  0450   INR 1.0       Significant Imaging:  Imaging results within the past 24 hours have been reviewed.

## 2019-05-23 NOTE — PLAN OF CARE
Problem: Adult Inpatient Plan of Care  Goal: Patient-Specific Goal (Individualization)  Dx: Urosepsis  Hx: HTN, DM, CAD, NSTEMI s/p stent 2014, and back pain who presents to [facility] with altered mental status and sepsis.     4/12: L5-S1 OLIF with NSGY-intraoperative left ureteral injury with ureteroureteral anastomosis and ureteral stent placement. Did well and was discharged home  4/20: ED for AMS, temp 103, tachy  4/21: Admitted to SICU after emergent ex-lap, IR for L nephrostomy placement, levo, vaso, insulin, propofol, blood cultures, flotrac, 3L LR, 800 isolyte, 2 units PRBC, 5L bolus  4/22: 500 albumin, vaso off  4/23: levo off  4/24: switched to precedex, blood culture  4/25:-4/29: SBT trials failed   4/30:  Blood cx.'s x2, VAP (BAL) cx  5/1: T-max: 101.7; CT Chest/ABD/pelvis w/contrast  5/2: Trach and PEG placed today, Accuchecks changed to Q2H.  5/3: US for right arm due to swelling. Midline placed, Trach collar trial successful, Trickle PEG tube feeds started. Heparing gtt started.   5/5: Bloody BMs x 4, H/H dropping  5/6: 1 unit PRBC's  5/7: Heparin gtt started.   5/8:  Pulmonary edema, R. Fem. TLC & R. Fem. A-line, CT-chest to r/o PE (neg.), Echo, Bronch., US BLE, CXR, 12-lead EKG.  Epi. gtts started.  5/9: VSS, PAV trial today--tolerating well, Heparin gtts infusing  5/11: 2u RBC  5/13; Sigmoidoscopy @ bedside, large bloody BM @ 2200; repacked with surgicel and colorectal team notified  5/14: 2 unit PRBC's    5/16- PRBCs x2  5/18: Panic attack, back on vent SPONT   5/19: tachycardic with low sats, back on rate, blood cultures, UA, and resp culture  5/20: Trach exchange, PRBC x2; blood cultures x2  5/21: flex sigmoidoscopy  5/22: trach collar x2hr; BRBPR    Nursing:  - MAP >60, SBP <180   - Accuchecks Q4H                                       Outcome: Ongoing (interventions implemented as appropriate)  Patient doing well this am. 2 brown BM. Worked with PT/OT. Trach collar ~2hr, c/o SOB, increased WOB,  placed on AC 40% 5 peep. Peep increased to 8 by MD. Lasix given x2 doses. This afternoon had BRBPR. H/H stable. T-max 100.8. Tolerating TF. C/o pain to back. Excellent uop with lasix. Patient and spouse updated on plan of care throughout shift. GI consulted, no scope at this time. Report given to oncoming RN.

## 2019-05-23 NOTE — ASSESSMENT & PLAN NOTE
Orantoinette Huff is a 59 y.o. female s/p left ureteral injury on 4/12, who presented with fever, AMS, and intraabdominal abscess, taken for washout and ligation of left ureter with nephrostomy tube placement on 4/21. S/p Trach/PEG on 5/2. Episode of negative pressure pulmonary edema on 5/8 requiring mechanical ventilation, diuresis and low dose pressor. Has been plagued by intermittent BRBPR.     Neuro:   - off sedation  - responds to questions appropriately by nodding  - no focal deficit     Pulmonary:   PAV+; try trach collar today  - ABGs prn  - diuresis as tolerates     Cardiac:   - alternating HTN and hypotension  - TTE 5/8 with no evidence of right heart strain or significant valvular pathology, normal LV systolic function, EF 70%     Renal:    - S/p transection of left ureter 4/12 and subsequent ligation and PCT nephrostomy tube placement with IR 4/21  - Fontenot removed 5/15, but then replaced for retention   - UO adequate  - BUN/Cr stable  - Monitor I/Os   - IR for nephrostomy tomorrow 5/24 after C scope        ID:   - AF  - EBC down  - Blood 5/10 NGTD  - Ucx 5/13 candiduria  - BAL 5/18 pseudomonas  - C diff 5/5 negative  - cefepime and rifampin (spine hardware)  - vanc stopped     Hem/Onc:   - Hgb 6.8 overnight - 1U PRBC given       Endocrine:  - DM Type 2  - TF @ goal  - LDSSI  - Endocrine following for assistance with blood glucose control.      Fluids/Electrolytes/Nutrition/GI:   - Free water flushes 300 cc q6h  - Replace electrolytes PRN     # rectal ulcers  - intermittent hematochezia; ulcers seen to be improving on partial colonoscopy 5/21  - no bleeding in last 24 hours  - anoscopy and hemostatic agent applied by CRS 5/15  - imodium QID  - C scope by CRS tomorrow 5/24     PPx:  - DVT: Lovenox, Hep gtt held for continued bloody BMs        DISPO:  - Continue SICU care  - CRS to perform C scope tomorrow 5/24  - IR to perform nephrostomy tomorrow 5/24 after C scope              Critical secondary to  Patient has a condition that poses threat to life and bodily function: Severe Respiratory Distress and rectal bleeding     Critical care was time spent personally by me on the following activities: development of treatment plan with patient or surrogate and bedside caregivers, discussions with consultants, evaluation of patient's response to treatment, examination of patient, ordering and performing treatments and interventions, ordering and review of laboratory studies, ordering and review of radiographic studies, pulse oximetry, re-evaluation of patient's condition.  This critical care time did not overlap with that of any other provider or involve time for any procedures.    Ehsan Espino MD  PGY2/CA1  Department of Anesthesiology  Pager: [phone]

## 2019-05-23 NOTE — ASSESSMENT & PLAN NOTE
Mariann Huff is a 59 y.o. female s/p left ureteral injury on 4/12, presented with fever, AMS, and intraabdominal abscess, taken for washout and ligation of left ureter with neph tube placement on 4/21, Trach/PEG 5/2.  - on vent; wean per SICU  - Tube feeds per SICU  - ID on board, appreciate recs:   - continue rifampin; continue cefepime x 7 days today, transition back to Ancef; continue rifampin and Ancef    until 6/18/19   - 5/13 UCx growing Candida albicans; 7 day course of diflucan 800 mg   - 5/19 BAL growing Pseudomonas   - ID recommends reimaging patient should she continue to show signs of infection  - Maintain neph tube and Fontenot  - Urine output adequate, Cr stable and WNL  - diuresis/fluids per SICU  - bloody BM   - flexible sigmoidoscopy on 5/21/19 showed no signs of bleeding, Colorectal recommends full colonoscopy     and EGD.    - GI on board, appreciate recs; colonoscopy today. EGD not indicated per GI.    Dispo: continue ICU care; plan for transfer to LTAC once stable. Colonoscopy today.

## 2019-05-23 NOTE — PROGRESS NOTES
"[facility]  Endocrinology  Progress Note    Admit Date: 4/20/2019     Reason for Consult: Management of T2DM, Hyperglycemia     Surgical Procedure and Date:       04/21/2019:         1. Exploratory laparotomy        2. Ligation of left ureter        3. Removal of left JJ ureteral stent      Diabetes diagnosis year: ANDRE    Home Diabetes Medications:  ANDRE  Toujeo 50 BID  Invokana 300mg daily   Novolog 35 units AM, 45 units PM, 35 units PM    How often checking glucose at home? ANDRE   BG readings on regimen: ANDRE  Hypoglycemia on the regimen?  ANDRE  Missed doses on regimen?  ANDRE    Diabetes Complications include:     Hyperglycemia and Diabetic retinopathy     Complicating diabetes co morbidities:   History of MI and MURTAZA, CAD, HLD    HPI:   Patient is a 59 y.o. female with a diagnosis of HTN, HLN, DM type 2, nonproliferative diabetic renopathy, CAD, NSTEMI, and back pain who presents to the ED with a complaint of altered mental status on 04/20/2019. Is now s/p Exploratory laparotomy, Ligation of left ureter, and Removal of left JJ ureteral stent. Endocrinology consulted to manage DM2/Hyperglycemia.    Lab Results   Component Value Date    HGBA1C 10.8 (H) 02/19/2019       Interval HPI:   Overnight events: NAEON. BG within goal ranges on current SQ insulin regimen.  Eating:   NPO  Nausea: No  Hypoglycemia and intervention: No  Fever: Yes (99.9)  TPN and/or TF: Yes  If yes, type of TF/TPN and rate: Peptamen Intense at 40 cc/hr.    BP (!) 115/55   Pulse 80   Temp 99.9 °F (37.7 °C) (Oral)   Resp (!) 28   Ht 5' 5" (1.651 m)   Wt 76 kg (167 lb 8.8 oz)   SpO2 99%   Breastfeeding? No   BMI 27.88 kg/m²      Labs Reviewed and Include    Recent Labs   Lab 05/23/19  0432   *   CALCIUM 8.7   ALBUMIN 1.4*   PROT 5.9*      K 4.4   CO2 23      BUN 38*   CREATININE 1.2   ALKPHOS 118   ALT 8*   AST 19   BILITOT 0.6     Lab Results   Component Value Date    WBC 16.64 (H) 05/23/2019    HGB 6.8 (L) " 05/23/2019    HCT 21.7 (L) 05/23/2019    MCV 91 05/23/2019     05/23/2019     No results for input(s): TSH, FREET4 in the last 168 hours.  Lab Results   Component Value Date    HGBA1C 10.8 (H) 02/19/2019       Nutritional status:   Body mass index is 27.88 kg/m².  Lab Results   Component Value Date    ALBUMIN 1.4 (L) 05/23/2019    ALBUMIN 1.5 (L) 05/22/2019    ALBUMIN 1.4 (L) 05/21/2019     No results found for: PREALBUMIN    Estimated Creatinine Clearance: 52.1 mL/min (based on SCr of 1.2 mg/dL).    Accu-Checks  Recent Labs     05/21/19  2019 05/22/19  0119 05/22/19  0508 05/22/19  0815 05/22/19  1218 05/22/19  1558 05/22/19  2013 05/23/19  0007 05/23/19  0433 05/23/19  0841   POCTGLUCOSE 100 142* 133* 136* 132* 135* 138* 108 127* 159*       Current Medications and/or Treatments Impacting Glycemic Control  Immunotherapy:    Immunosuppressants     None        Steroids:   Hormones (From admission, onward)    None        Pressors:    Autonomic Drugs (From admission, onward)    None        Hyperglycemia/Diabetes Medications:   Antihyperglycemics (From admission, onward)    Start     Stop Route Frequency Ordered    05/23/19 1200  insulin aspart U-100 pen 4 Units      -- SubQ Every 24 hours (non-standard times) 05/22/19 1554    05/23/19 0800  insulin aspart U-100 pen 4 Units      -- SubQ Every 24 hours (non-standard times) 05/22/19 1554    05/23/19 0400  insulin aspart U-100 pen 4 Units      -- SubQ Every 24 hours (non-standard times) 05/22/19 1554    05/23/19 0000  insulin aspart U-100 pen 4 Units      -- SubQ Every 24 hours (non-standard times) 05/22/19 1554    05/22/19 2000  insulin aspart U-100 pen 4 Units      -- SubQ Every 24 hours (non-standard times) 05/22/19 1554    05/22/19 1600  insulin aspart U-100 pen 4 Units      -- SubQ Every 24 hours (non-standard times) 05/22/19 1554    05/20/19 1647  insulin aspart U-100 pen 1-10 Units      -- SubQ Every 6 hours PRN 05/20/19 1547          ASSESSMENT and PLAN    *  Ureteral transection of left native kidney  Managed per primary team  Avoid hypoglycemia        Type 2 diabetes mellitus with diabetic peripheral angiopathy without gangrene  BG goal 140 - 180    Novolog 4 units q 4 hrs.  Hold if TFs are stopped.  Moderate Dose SQ Insulin Correction Scale PRN for BG excursions. Patient has proven insulin resistance during admission. Cr WNL  BG monitoring q 4 hrs.     ** Please notify Endocrine for any change and/or advance in diet/TF**  ** Please call Endocrine for any BG related issues **    Discharge Recommendations:     TBD.             CAD (coronary artery disease)  Managed per primary team  Condition may cause insulin resistance       Sleep apnea  May affect BG readings if uncontrolled        PAPA (acute kidney injury)  Caution with insulin stacking        HLD (hyperlipidemia)  Managed per primary team  Condition may cause insulin resistance         On enteral nutrition  May cause BG excursions.           Jolanta Morales NP  Endocrinology  [facility]

## 2019-05-23 NOTE — CONSULTS
[facility]  Gastroenterology  Consult Note    Patient Name: Mariann Huff  MRN: [id number]  Admission Date: 4/20/2019  Hospital Length of Stay: 32 days  Code Status: Full Code   Attending Provider: Robin Boyd MD   Consulting Provider: Danita Dior MD  Primary Care Physician: Jasbir Haney MD  Principal Problem:Ureteral transection of left native kidney    Consults  Subjective:     HPI:  Mrs Huff is a 58 y/o female with past medical history of HTN, T2DM, CAD, who presented for L5-S1 OLIF with neurosurgery and complicated by left ureteral injury with ureteroureteral anastomosis and ureteral stent placement. The patient had a non-eventful immediate post operative course and got discharged. On 4/20, the patient was found altered with fevers, and found to have E.coli septic shock. She went to OR on 4/21, with emergent Ex-lap with left nephrostomy placement. She was on pressors briefly and antibiotics. She failed multiple trials to extubate, eventually undergoing trach/PEG on 5/2. On 5/3 the patient was found to have DVT in both upper extremities, and heparin drip was started. Starting 5/5, the patient had signifcant amount of rectal bleeding with drop in H/H requiring transfusions ~ every 3-5 days. CRS was consulted and bleeding was thought to be due to ulcer at site of rectal tube. A flex was done on 5/13 and a rectal ulcer was seen but with no active bleeding, although blood was seen in the colon. She had recurrent bleeding despite this. A flex sig was repeated, and the ulcer was again seen, reportedly unchanged with no active bleeding. Considerations for EGD/Colonoscopy was recommended if bleeding recurs.    Earlier toady the patient had x2 brown non-bloody BMs, with later having x2 bloody BMs.    The patient has a tracheostomy and cannot provide full history. She denies abdominal pain, and denies history of hematochezia in the past.  The patient is currently HD stable.  Withdrawal from PEG tube  with clear tube feeding.    A colonoscopy in  was normal with a fair prep. EGD in 2016 for dysphagia with a mild Schatzki's ring with no other abnormalities.    Hb earlier today is 8.4      Past Medical History:   Diagnosis Date    Anticoagulant long-term use     Asthma     Back pain     Bradycardia, unspecified 2019    The etiology of the bradycardic episode is unclear.  The have appear to be respiratory in origin (at least the 1st episode).  We will continue to monitor carefully.  We are awaiting evaluation by Cardiology.      CAD (coronary artery disease)     s/p stentimg  (2), (1)    Carotid artery stenosis     Diabetes mellitus type 2 in obese     HTN (hypertension), benign     Hyperlipidemia     Keloid cicatrix     NPDR (nonproliferative diabetic retinopathy) 2015    NSTEMI (non-ST elevated myocardial infarction)     Nuclear sclerosis - Right Eye 3/18/2014    Primary localized osteoarthrosis, lower leg 2014    Sleep apnea        Past Surgical History:   Procedure Laterality Date    BRONCHOSCOPY N/A 2019    Performed by Sean Ruano MD at [facility] OR 2ND FLR    CARDIAC CATHETERIZATION      cataract extraction left eye      cataracts      CATHETERIZATION, HEART, LEFT Left 2014    Performed by Wilman Kim MD at [facility] CATH LAB     SECTION, LOW TRANSVERSE      CORONARY ANGIOPLASTY      Creation, Nephrostomy, Percutaneous Left 2019    Performed by Karina Surgeon at [facility] KARINA    CREATION, TRACHEOSTOMY N/A 2019    Performed by Sean Ruano MD at [facility] OR 2ND FLR    EGD, WITH PEG TUBE INSERTION  2019    Performed by Sean Ruano MD at [facility] OR 2ND FLR    ESOPHAGOGASTRODUODENOSCOPY (EGD) N/A 2016    Performed by Gardenia Adamson MD at [facility] ENDO (4TH FLR)    EXCISION TURBINATE, SUBMUCOUS      FUSION, SPINE, LUMBAR, ANTERIOR APPROACH Left L5-S1 Anterior to Psoa Interbody Fusion, L5-S1 Posterior Instrumentation Left  4/12/2019    Performed by Mk George MD at [facility] OR 2ND FLR    HAND SURGERY Left     HAND SURGERY Right     torn ligament repair/ Dr. Yeboah    HYSTERECTOMY      Injection,steroid,epidural,transforaminal approach - Bilateral - S1 Bilateral 9/25/2018    Performed by Tl Abreu MD at [facility] PAIN MGT    Injection-steroid-epidural-caudal N/A 2/7/2019    Performed by Dave Bentley Jr., MD at [facility] PAIN MGT    INSERTION-INTRAOCULAR LENS (IOL) Right 9/1/2015    Performed by Good Domingo MD at [facility] OR 1ST FLR    LAPAROTOMY, EXPLORATORY; LIGATION OF LEFT URETER; DOUBLE J STENT REMOVAL LEFT URETER Left 4/20/2019    Performed by Paul Carlson MD at [facility] OR 2ND FLR    left foot surgery      left wrist surgery      NASAL SEPTUM SURGERY  5/7/15    PHACOEMULSIFICATION-ASPIRATION-CATARACT Right 9/1/2015    Performed by Good Domingo MD at [facility] OR 1ST FLR    REPAIR, URETER  4/12/2019    Performed by Mk George MD at [facility] OR 2ND FLR    RESECTION-TURBINATES (SMR) N/A 5/7/2015    Performed by Dileep Dubois III, MD at [facility] OR 2ND FLR    rt elbow surgery      S/P LAD COATED STENT  05/14/2010    6 total     S/P OM1 STENT  08/2003    SEPTOPLASTY N/A 5/7/2015    Performed by Dileep Dubois III, MD at [facility] OR 2ND FLR    SIGMOIDOSCOPY, FLEXIBLE N/A 5/21/2019    Performed by ALBERTO Amin MD at [facility] ENDO (2ND FLR)    SIGMOIDOSCOPY, FLEXIBLE N/A 5/13/2019    Performed by ALBERTO Amin MD at [facility] ENDO (2ND FLR)    SINUS SURGERY      F.E.S.S.    SINUS SURGERY FUNCTIONAL ENDOSCOPIC WITH NAVIGATION WITH MAXILLARIES, ETHMOIDS, LEFT SPHENOID, LEFT LOLY N/A 5/7/2015    Performed by Dileep Dubois III, MD at [facility] OR 2ND FLR    STENT, URETERAL placement  4/12/2019    [facility]    TUBAL LIGATION         Review of patient's allergies indicates:  No Known Allergies  Family History     Problem Relation (Age of Onset)    Diabetes Mother,  Father, Sister, Brother, Sister    Heart attack Father    Heart disease Mother    Leukemia Father    No Known Problems Sister, Brother, Brother, Maternal Grandmother, Maternal Grandfather, Paternal Grandmother, Paternal Grandfather, Son, Son, Maternal Aunt, Maternal Uncle, Paternal Aunt, Paternal Uncle        Tobacco Use    Smoking status: Never Smoker    Smokeless tobacco: Never Used   Substance and Sexual Activity    Alcohol use: No     Alcohol/week: 0.0 oz    Drug use: No    Sexual activity: Yes     Partners: Male     Birth control/protection: Post-menopausal     Comment:      Review of Systems   Unable to perform ROS: Other     Objective:     Vital Signs (Most Recent):  Temp: 98.3 °F (36.8 °C) (05/22/19 1900)  Pulse: 96 (05/22/19 2118)  Resp: (!) 28 (05/21/19 1500)  BP: (!) 142/64 (05/22/19 2100)  SpO2: 100 % (05/22/19 2118) Vital Signs (24h Range):  Temp:  [98.3 °F (36.8 °C)-100.8 °F (38.2 °C)] 98.3 °F (36.8 °C)  Pulse:  [] 96  SpO2:  [90 %-100 %] 100 %  BP: ()/(55-94) 142/64     Weight: 76 kg (167 lb 8.8 oz) (05/17/19 0500)  Body mass index is 27.88 kg/m².      Intake/Output Summary (Last 24 hours) at 5/22/2019 2148  Last data filed at 5/22/2019 2100  Gross per 24 hour   Intake 2550 ml   Output 2440 ml   Net 110 ml       Lines/Drains/Airways     Drain                 Nephrostomy 04/21/19 0629 Left 8 Fr. 31 days         Gastrostomy/Enterostomy 05/02/19 1134 Percutaneous endoscopic gastrostomy (PEG) LUQ decompression;feeding 20 days         Urethral Catheter 05/16/19 1040 16 Fr. 6 days          Airway                 Surgical Airway 05/02/19 1107 Shiley Cuffed 20 days          Peripheral Intravenous Line                 Midline Catheter Insertion/Assessment  - Single Lumen 05/07/19 1632 Left basilic vein (medial side of arm) 18g x 8cm 15 days         Peripheral IV - Single Lumen 05/21/19 0800 20 G Right Antecubital 1 day                Physical Exam   Constitutional: She is oriented to  person, place, and time. She appears ill. No distress.   Tracheostomy in place.   HENT:   Head: Normocephalic.   Eyes: Conjunctivae are normal. No scleral icterus.   Neck: Normal range of motion. Neck supple.   Cardiovascular: Normal rate and regular rhythm.   Pulmonary/Chest: Breath sounds normal.   Trachestomy on vent   Abdominal: Soft. Bowel sounds are normal. She exhibits no distension and no mass. There is no tenderness. There is no rebound and no guarding.   Red blood in BM with small amounts of brown stool   Musculoskeletal: Normal range of motion.   Neurological: She is alert and oriented to person, place, and time.   Skin: Skin is warm and dry. There is pallor.   Psychiatric: She has a normal mood and affect.       Significant Labs:  CBC:   Recent Labs   Lab 05/21/19  0339 05/22/19  0650 05/22/19  1815   WBC 20.27* 16.09* 18.30*   HGB 7.6* 7.6* 8.4*   HCT 23.9* 24.5* 27.0*    254 267     BMP:   Recent Labs   Lab 05/22/19  0450   *      K 4.1   *   CO2 22*   BUN 32*   CREATININE 0.7   CALCIUM 8.6*   MG 1.8     CMP:   Recent Labs   Lab 05/22/19  0450   *   CALCIUM 8.6*   ALBUMIN 1.5*   PROT 5.8*      K 4.1   CO2 22*   *   BUN 32*   CREATININE 0.7   ALKPHOS 116   ALT 8*   AST 23   BILITOT 0.6     Coagulation:   Recent Labs   Lab 05/22/19  0450   INR 1.0       Significant Imaging:  Imaging results within the past 24 hours have been reviewed.    Assessment/Plan:     BRBPR (bright red blood per rectum)  Patient is a 58 y/o female with complicated admission due to ureteral injury, currently with recurrent BRBPR.   Underwent flex sig x2 with rectal ulcer seen but no active bleeding, and per CRS team this was not thought to be cause of bleeding.  Consideration for EGD/Colonscopy was recommended.  Patient's bleeding is highly unlikely to be due to UGI bleeding. Likely source is colon, suspect ulcer and rectal trauma as cause of bleeding. Will continue to monitor to  determine need for colonoscopy, particularly as colonoscopy prep will be challenging in this patient who is bed bound on a ventilator.    Continue to monitor for recurrent bleeding  Trend H/H and transfuse as needed  Continue to hold heparin  Will continue to follow        Thank you for your consult. I will follow-up with patient. Please contact us if you have any additional questions.    Danita Dior MD  Gastroenterology  [facility]

## 2019-05-23 NOTE — PLAN OF CARE
Problem: Adult Inpatient Plan of Care  Goal: Plan of Care Review  Outcome: Ongoing (interventions implemented as appropriate)  Patient on 40% FiO2, 8 of PEEP, all VSS through shift. T-max 99.5. Urinary output in flow sheet. Nephrostomy tube output of 0 through shift, MDs aware of leaking around site of tube. Labs drawn, replacements given. All questions answered by RN, will continue to monitor.

## 2019-05-23 NOTE — SUBJECTIVE & OBJECTIVE
Interval History: No AEON.   Afebrile and WBC decreased to 16.64 from peak 23.19.  SOB improved and mucus decreasing.  CT ABD done.  Nephrostomy tube leaking and going to IR today for eval.  The patient denies any recent fever, chills, or sweats.      Review of Systems   Constitutional: Negative for activity change, chills, diaphoresis and fever.   Respiratory: Negative for cough, shortness of breath and wheezing.    Cardiovascular: Negative for chest pain.   Gastrointestinal: Negative for abdominal pain, constipation, diarrhea, nausea and vomiting.   Genitourinary: Negative for dysuria, frequency and urgency.   Neurological: Negative for dizziness.   Hematological: Does not bruise/bleed easily.     Objective:     Vital Signs (Most Recent):  Temp: 99.9 °F (37.7 °C) (05/23/19 0924)  Pulse: 82 (05/23/19 1045)  Resp: (!) 28 (05/21/19 1500)  BP: (!) 125/58 (05/23/19 1030)  SpO2: 98 % (05/23/19 1045) Vital Signs (24h Range):  Temp:  [98.3 °F (36.8 °C)-100.8 °F (38.2 °C)] 99.9 °F (37.7 °C)  Pulse:  [] 82  SpO2:  [90 %-100 %] 98 %  BP: ()/(52-94) 125/58     Weight: 76 kg (167 lb 8.8 oz)  Body mass index is 27.88 kg/m².    Estimated Creatinine Clearance: 52.1 mL/min (based on SCr of 1.2 mg/dL).    Physical Exam   Constitutional: She is oriented to person, place, and time. She appears well-developed and well-nourished. No distress.   HENT:   Head: Normocephalic and atraumatic.   Neck:   tracheostomy in place   Cardiovascular: Normal rate, regular rhythm and normal heart sounds. Exam reveals no gallop and no friction rub.   No murmur heard.  Pulmonary/Chest: Effort normal and breath sounds normal. No stridor. No respiratory distress.   Abdominal: Soft. She exhibits no distension. There is no tenderness.   Midline incision c/d/i   Genitourinary:   Genitourinary Comments: Nephrostomy tube in place with clear yellow urine output   Musculoskeletal: She exhibits no edema.   Neurological: She is alert and oriented to  person, place, and time.   Following commands   Skin: Skin is warm and dry. She is not diaphoretic.   Psychiatric: She has a normal mood and affect. Her behavior is normal.       Significant Labs:   Blood Culture:   Recent Labs   Lab 05/10/19  1015 05/10/19  1020 05/19/19  1843 05/20/19  1300 05/20/19  1306   LABBLOO No growth after 5 days. No growth after 5 days. No Growth to date  No Growth to date  No Growth to date  No Growth to date No Growth to date  No Growth to date  No Growth to date No Growth to date  No Growth to date  No Growth to date     CBC:   Recent Labs   Lab 05/22/19  0650 05/22/19  1815 05/23/19  0432   WBC 16.09* 18.30* 16.64*   HGB 7.6* 8.4* 6.8*   HCT 24.5* 27.0* 21.7*    267 246     CMP:   Recent Labs   Lab 05/22/19  0450 05/23/19  0432    138   K 4.1 4.4   * 109   CO2 22* 23   * 115*   BUN 32* 38*   CREATININE 0.7 1.2   CALCIUM 8.6* 8.7   PROT 5.8* 5.9*   ALBUMIN 1.5* 1.4*   BILITOT 0.6 0.6   ALKPHOS 116 118   AST 23 19   ALT 8* 8*   ANIONGAP 8 6*   EGFRNONAA >60.0 49.9*     Wound Culture:   Recent Labs   Lab 04/21/19  0125 05/19/19  1241   LABAERO STAPHYLOCOCCUS LUGDUNENSIS  Rare   No growth     All pertinent labs within the past 24 hours have been reviewed.    Significant Imaging: I have reviewed all pertinent imaging results/findings within the past 24 hours.   X-Ray Chest AP Portable [[id number]] Resulted: 05/23/19 0808   Order Status: Completed Updated: 05/23/19 0810   Narrative:     EXAMINATION:  XR CHEST AP PORTABLE    CLINICAL HISTORY:  f/u diuresis today;    COMPARISON:  Comparison is made to 05/22/2019 at 13:42    FINDINGS:  There has been some interval clearing of airspace consolidation in the perihilar lung zones since 05/22/2019 at 13:42, although some airspace consolidation in the left perihilar region persists.  No significant detrimental interval change in the appearance of the chest since that time is appreciated.  No pneumothorax.    Impression:       As above      Electronically signed by: Christian Moreno MD  Date: 05/23/2019  Time: 08:08   X-Ray Chest AP Portable [[id number]] Resulted: 05/23/19 0555   Order Status: Completed Updated: 05/23/19 0557   Narrative:     EXAMINATION:  XR CHEST AP PORTABLE    CLINICAL HISTORY:  PNA;    COMPARISON:  Comparison is made to 05/22/2019 at 20:09.    FINDINGS:  Allowing for differences in patient positioning, there has been no significant interval change in the appearance of the chest since 05/22/2019 at 20:09.  No pneumothorax.   Impression:       As above      Electronically signed by: Christian Moreno MD  Date: 05/23/2019  Time: 05:55   CTA Abdomen and Pelvis [[id number]] (Abnormal) Resulted: 05/23/19 0139   Order Status: Completed Updated: 05/23/19 0142   Narrative:     EXAMINATION:  CTA ABDOMEN AND PELVIS    CLINICAL HISTORY:  GI bleeding;    TECHNIQUE:  Using 100 cc of  Omnipaque 350 IV, and multi-detector helical CT technique, axial CT angiogram images of the abdomen were obtained from the lung bases through the pelvis. Precontrast and portal venous phase images of the abdomen and pelvis also done. 2D post-processing coronal and sagittal reconstructions of the abdominal aorta and visceral arteries performed.    COMPARISON:  CT chest/abdomen/pelvis from 05/01/2019.    FINDINGS:  Exam is somewhat limited due to patient's arm positioning.    Heart: Normal in size. No pericardial effusion.    Lung Bases: Bilateral small volume dependent pleural fluid.  Persistent consolidative changes at the bilateral lung bases.    Liver: Enlarged with normal attenuation.  No focal hepatic lesions.  Portal vein is patent.    Gallbladder: No calcified gallstones.    Bile Ducts: No evidence of dilated ducts.    Pancreas: No mass or peripancreatic fat stranding.    Spleen: Enlarged with multiple new expansile hypoattenuating lesions, largest in the superolateral aspect measuring up to 6.4 cm.  No convincing arterial enhancement or  extravasation.    Adrenals: Unremarkable.    Kidneys/ Ureters: Left kidney is normal in size and location with mild hydronephrosis/proximal hydroureter and nephrostomy tube in place.  Postoperative changes noted status post ureteral repair, mid ureter is not well characterized due to artifact from arm positioning and lumbar hardware.  Multiple stable cystic hypoattenuating lesions throughout the left kidney, largest consistent with simple cyst. Right kidney is normal in size and location with stable subcentimeter hypoattenuating lesion which is too small to fully characterize but likely a small cyst.  No hydronephrosis or hydroureter.    Bladder: Decompressed with Fontenot catheter in place.    Reproductive organs: Status post hysterectomy.    GI Tract/Mesentery: No intraluminal pooling of contrast to suggest active GI hemorrhage. No evidence of bowel obstruction or inflammation.    Peritoneal Space: Small amount of free fluid in the pelvis.  No free air.    Retroperitoneum:  No significant adenopathy.    Abdominal wall:  Postoperative changes status post midline incision with persistent subcutaneous hypoattenuating collection inferior to the incision site measuring 5.3 x 3.4 cm.  There is decreased surrounding fat stranding.  Diffuse body wall edema.    Vasculature: Moderate atherosclerosis with prominent plaque at the celiac origin which results in at least 50% narrowing.  No aneurysm.    Bones: No acute fracture.  Posterior instrumented fusion of L5-S1 with disc spacer device in place.  Age-appropriate degenerative changes.   Impression:       No evidence of active GI hemorrhage.    Multiple new expansile hypoattenuating lesions in the spleen without significant enhancement or gross contrast extravasation.  Findings are nonspecific may represent hematoma, infarct, or less likely abscess.    Interval development of mild left hydronephrosis in patient status post left ureteral repair and for ostomy tube placement.  Of  note, difficult to evaluate the mid ureter due to artifact from patient's arm positioning and spinal hardware.    Persistent bilateral pleural effusions and airspace opacities most consistent with pulmonary edema.    Focal subcutaneous fluid collection inferior to the midline incision site, slightly larger with decreased surrounding fat stranding.  Findings may represent seroma, hematoma, or less likely abscess.    Aortic atherosclerosis with prominent plaque at the celiac origin resulting in at least 50% narrowing, similar to prior May 1, 2019.    Additional findings as above.    This report was flagged in Epic as abnormal.    Electronically signed by resident: Abraham Peña  Date: 05/22/2019  Time: 22:59    Electronically signed by: Pancho Sheikh MD  Date: 05/23/2019  Time: 01:39   X-Ray Chest AP Portable [[id number]] Resulted: 05/22/19 1442   Order Status: Completed Updated: 05/22/19 1444   Narrative:     EXAMINATION:  XR CHEST AP PORTABLE    CLINICAL HISTORY:  rule out pulm edema;    TECHNIQUE:  Single frontal view of the chest was performed.    COMPARISON:  05/21/2019, 10:36 hours.    FINDINGS:  Tracheostomy cannula tip overlies the airway.  Ventilator apparatus projects over the mediastinum and left hemithorax.  Extrinsic leads and other extrinsic devices also overlie the chest and upper abdomen.    Mediastinal structures are midline.    There is mixed interstitial and airspace disease in perihilar distribution, worse on the left.  This has progressed since yesterday's study of 10/30 6 hr.  Given the distribution and rapid progression I suspect pulmonary edema although aspiration, pneumonia or pulmonary hemorrhage could present in a similar fashion.    I do not identify pleural fluid, pneumothorax, pneumomediastinum, pneumoperitoneum or significant osseous abnormality.   Impression:       Abnormal appearance of the chest radiograph.      Electronically signed by: Fay Loyola MD  Date: 05/22/2019  Time:  14:42   X-Ray Chest AP Portable [[id number]] Resulted: 05/21/19 1055   Order Status: Completed Updated: 05/21/19 1057   Narrative:     EXAMINATION:  XR CHEST AP PORTABLE    CLINICAL HISTORY:  PNA;    COMPARISON:  Comparison is made to 05/21/2019 at 06:02.    FINDINGS:  Tracheostomy cannula again seen.  Cardiomediastinal silhouette is magnified both by projection and by a poor inspiratory depth level.  I doubt that the true cardiac size is any larger than upper limit of normal allowing for these technical factors, and the cardiomediastinal silhouette has not changed appreciably since the examination referenced above.  Pulmonary vascularity in the upper lung zones is minimally prominent, as before.  Lung zones appear stable, with some patchy airspace consolidation in the left mid/lower lung zones again seen but with no significant new areas of airspace consolidation or volume loss evident.  Some of the opacity in the inferior hemothorax on the left side may reflect pleural fluid.  No definite pleural fluid on the right.  No pneumothorax.   Impression:       Allowing for an even poorer inspiratory depth level on the current exam, there has been no significant interval change in the appearance of the chest since 05/21/2019 at 06:02.      Electronically signed by: Christian Moreno MD  Date: 05/21/2019  Time: 10:55   X-Ray Chest AP Portable [[id number]] Resulted: 05/21/19 0800   Order Status: Completed Updated: 05/21/19 0802   Narrative:     EXAMINATION:  XR CHEST AP PORTABLE    CLINICAL HISTORY:  PNA;    COMPARISON:  Comparison is made to 05/20/2019.    FINDINGS:  No significant interval change in the appearance of the chest since 05/20/2019 is appreciated, allowing for a poorer inspiratory depth level on the current examination.  No pneumothorax.   Impression:       As above      Electronically signed by: Christian Moreno MD  Date: 05/21/2019  Time: 08:00   X-Ray Chest AP Portable [[id number]] Resulted: 05/20/19 0717   Order Status:  Completed Updated: 05/20/19 0719   Narrative:     EXAMINATION:  XR CHEST AP PORTABLE    CLINICAL HISTORY:  eval consolidation;    TECHNIQUE:  Single frontal view of the chest was performed.    COMPARISON:  May 19, 2019.    FINDINGS:  Tracheostomy cannula monitoring leads remain.  Heart size is similar.  Increase in the pulmonary vascular and perihilar alveolar markings though some improvement in the aeration in the peripheral lung fields.   there may be some pleural fluid on the left.  No pneumothorax.   Impression:       Findings most consistent with congestive heart failure.  Aeration is improved.      Electronically signed by: Mk Reilly MD  Date: 05/20/2019  Time: 07:17   X-Ray Chest AP Portable [[id number]] Resulted: 05/19/19 1513   Order Status: Completed Updated: 05/19/19 1516   Narrative:     EXAMINATION:  XR CHEST AP PORTABLE    CLINICAL HISTORY:  respiratory distress;    TECHNIQUE:  Single frontal view of the chest was performed.    COMPARISON:  05/19/2019 at 05:11    FINDINGS:  Tracheostomy tube in the clavicular heads.    Diffuse alveolar filling process unchanged.  No pneumothorax or pleural effusion.  The cardiomediastinal silhouette, osseous and soft tissue structures are stable.   Impression:       No significant interval change in what is likely diffuse alveolar pulmonary edema.  Infection can have a similar appearance.      Electronically signed by: Sharon Bro MD  Date: 05/19/2019  Time: 15:13   X-Ray Chest AP Portable [[id number]] Resulted: 05/19/19 0558   Order Status: Completed Updated: 05/19/19 0600   Narrative:     EXAMINATION:  XR CHEST AP PORTABLE    CLINICAL HISTORY:  trach;    TECHNIQUE:  Single frontal view of the chest was performed.    COMPARISON:  05/18/2019    FINDINGS:  Tracheostomy cannula projects in stable radiographic position.  Cardiomediastinal silhouette is unchanged.  The lungs demonstrate persistent diffuse increased interstitial attenuation and perihilar opacities,  similar to prior examination.  No evidence of pneumothorax.   Impression:       No significant detrimental interval change compared to 05/18/2019.      Electronically signed by: Al Wu MD  Date: 05/19/2019  Time: 05:58   X-Ray Chest AP Portable [[id number]] Resulted: 05/18/19 0751   Order Status: Completed Updated: 05/18/19 0754   Narrative:     EXAMINATION:  XR CHEST AP PORTABLE    CLINICAL HISTORY:  trach;    TECHNIQUE:  Single frontal view of the chest was performed.    COMPARISON:  Chest AP portable dated 05/17/2019    FINDINGS:  Tracheostomy tube again noted.  Coarsened, central predominant interstitial attenuation with improved left perihilar infiltrate and new left basilar opacity.  Heart size is similar.  No pneumothorax or free air.   Impression:       As above      Electronically signed by: Tristan Flores  Date: 05/18/2019  Time: 07:51   X-Ray Chest AP Portable [[id number]] Resulted: 05/17/19 0903   Order Status: Completed Updated: 05/17/19 0905   Narrative:     EXAMINATION:  XR CHEST AP PORTABLE    CLINICAL HISTORY:  trach;    TECHNIQUE:  Single frontal view of the chest was performed.    COMPARISON:  No 05/16/2019 ne      Electronically signed by: Christian Valencia MD  Date: 05/17/2019  Time: 09:03

## 2019-05-23 NOTE — SUBJECTIVE & OBJECTIVE
Interval History/Significant Events: Episode of rectal bleeding overnight. Hgb 6.8 - 1U PRBC. UOP adequate.     Follow-up For: Procedure(s) (LRB):  SIGMOIDOSCOPY, FLEXIBLE (N/A)    Post-Operative Day: 1 Day Post-Op    Objective:     Vital Signs (Most Recent):  Temp: 99.9 °F (37.7 °C) (05/23/19 0924)  Pulse: 81 (05/23/19 0930)  Resp: (!) 28 (05/21/19 1500)  BP: (!) 142/63 (05/23/19 0930)  SpO2: 99 % (05/23/19 0930) Vital Signs (24h Range):  Temp:  [98.3 °F (36.8 °C)-100.8 °F (38.2 °C)] 99.9 °F (37.7 °C)  Pulse:  [] 81  SpO2:  [90 %-100 %] 99 %  BP: ()/(52-94) 142/63     Weight: 76 kg (167 lb 8.8 oz)  Body mass index is 27.88 kg/m².      Intake/Output Summary (Last 24 hours) at 5/23/2019 1028  Last data filed at 5/23/2019 0924  Gross per 24 hour   Intake 2888 ml   Output 2825 ml   Net 63 ml       Physical Exam   Constitutional: She appears well-developed and well-nourished.   HENT:   Head: Normocephalic and atraumatic.   Tracheostomy in place   Eyes: Right eye exhibits no discharge. Left eye exhibits no discharge.   Neck: Normal range of motion. Neck supple.   Cardiovascular: Normal rate and regular rhythm.   Pulmonary/Chest: Effort normal. No respiratory distress.   Abdominal: Soft.   Midline incision c/d/i.  PEG with no leak. Abdomen soft, non-distended.  Bowel sounds present   Genitourinary:   Genitourinary Comments: Nephrostomy tube in place with yellow output.  Fontenot in place  No rectal bleeding since partial colonoscopy   Musculoskeletal: Normal range of motion.   Neurological: She is alert.   Able to follow commands, denying pain.    Skin: Skin is warm and dry.   Psychiatric: She has a normal mood and affect.   Nursing note and vitals reviewed.      Vents:  Vent Mode: A/C (05/23/19 0930)  Ventilator Initiated: Yes (05/08/19 0630)  Set Rate: 20 bmp (05/23/19 0930)  Vt Set: 420 mL (05/23/19 0930)  Pressure Support: 10 cmH20 (05/23/19 0930)  PEEP/CPAP: 8 cmH20 (05/23/19 0930)  Oxygen Concentration (%):  40 (05/23/19 0930)  Peak Airway Pressure: 24 cmH2O (05/23/19 0930)  Plateau Pressure: 0 cmH20 (05/23/19 0930)  Total Ve: 8.8 mL (05/23/19 0930)  Negative Inspiratory Force (cm H2O): -18 (04/27/19 1306)  F/VT Ratio<105 (RSBI): 173.91 (05/21/19 1500)    Lines/Drains/Airways     Drain                 Nephrostomy 04/21/19 0629 Left 8 Fr. 32 days         Gastrostomy/Enterostomy 05/02/19 1134 Percutaneous endoscopic gastrostomy (PEG) LUQ decompression;feeding 20 days         Urethral Catheter 05/16/19 1040 16 Fr. 6 days          Airway                 Surgical Airway 05/02/19 1107 Shiley Cuffed 20 days          Peripheral Intravenous Line                 Peripheral IV - Single Lumen 05/23/19 0651 22 G Posterior;Right Hand less than 1 day         Peripheral IV - Single Lumen 05/23/19 0652 20 G Posterior;Right Upper Arm less than 1 day                Significant Labs:    CBC/Anemia Profile:  Recent Labs   Lab 05/22/19  0650 05/22/19  1815 05/23/19  0432   WBC 16.09* 18.30* 16.64*   HGB 7.6* 8.4* 6.8*   HCT 24.5* 27.0* 21.7*    267 246   MCV 93 92 91   RDW 14.9* 14.6* 14.7*        Chemistries:  Recent Labs   Lab 05/22/19  0450 05/23/19  0432    138   K 4.1 4.4   * 109   CO2 22* 23   BUN 32* 38*   CREATININE 0.7 1.2   CALCIUM 8.6* 8.7   ALBUMIN 1.5* 1.4*   PROT 5.8* 5.9*   BILITOT 0.6 0.6   ALKPHOS 116 118   ALT 8* 8*   AST 23 19   MG 1.8 1.6   PHOS 2.5* 3.4       All pertinent labs within the past 24 hours have been reviewed.    Significant Imaging:  I have reviewed all pertinent imaging results/findings within the past 24 hours.

## 2019-05-23 NOTE — TREATMENT PLAN
GI Treatment Plan    Mariann uHff is a 59 y.o. female admitted to hospital 4/20/2019 (Hospital Day: 34) due to Ureteral transection of left native kidney.     Interval History  Patient had additional 4 bloody bowel movements overnight with Hb drop  No pain    Objective  Temp:  [98.3 °F (36.8 °C)-100.8 °F (38.2 °C)] 99.9 °F (37.7 °C) (05/23 0924)  Pulse:  [] 81 (05/23 0930)  BP: ()/(52-94) 142/63 (05/23 0930)  SpO2:  [90 %-100 %] 99 % (05/23 0930)    General: Alert,  no distress  Abdomen: Normoactive bowel sounds. Non-distended. Normal tympany. Soft. Non-tender. No peritoneal signs.    Laboratory    Recent Labs   Lab 05/22/19  0650 05/22/19  1815 05/23/19  0432   HGB 7.6* 8.4* 6.8*       Lab Results   Component Value Date    WBC 16.64 (H) 05/23/2019    HGB 6.8 (L) 05/23/2019    HCT 21.7 (L) 05/23/2019    MCV 91 05/23/2019     05/23/2019       Lab Results   Component Value Date     05/23/2019    K 4.4 05/23/2019     05/23/2019    CO2 23 05/23/2019    BUN 38 (H) 05/23/2019    CREATININE 1.2 05/23/2019    CALCIUM 8.7 05/23/2019    ANIONGAP 6 (L) 05/23/2019    ESTGFRAFRICA 57.6 (A) 05/23/2019    EGFRNONAA 49.9 (A) 05/23/2019       Lab Results   Component Value Date    ALT 8 (L) 05/23/2019    AST 19 05/23/2019    ALKPHOS 118 05/23/2019    BILITOT 0.6 05/23/2019       Lab Results   Component Value Date    INR 1.1 05/23/2019    INR 1.0 05/22/2019    INR 1.0 05/21/2019       Plan  Given continued bleeding, will proceed with colonoscopy tomorrow.  Ordered golytely prep to be given through PEG tube.  Hold Tube feed after midnight  Trend H/H and transfuse as indicated.  - Plan of care was discussed with primary team.  - We will continue to follow.    Thank you for involving us in the care of Mariann Huff. Please call with any additional questions, concerns or changes in the patient's clinical status.    Danita Dior MD  Gastroenterology Fellow, PGY IV  Spectralink: [id number]

## 2019-05-23 NOTE — ANESTHESIA PREPROCEDURE EVALUATION
[facility]  Anesthesia Pre-Operative Evaluation         Patient Name: Mariann Huff  YOB: 1960  MRN: [id number]    SUBJECTIVE:     Pre-operative evaluation for Procedure(s) (LRB):  COLONOSCOPY (N/A)     05/23/2019    Mariann Huff is a 59 y.o. female w/ a significant PMHx of Asthma, carotid artery stenosis (20-39%), CAD s/p PCI with GINGER to LAD (2014), DM II on insulin, HTN, HLD, MURTAZA  S/p L5-S1 anterior fusion (4/21) c/b left ureteral injury with ureteroureteral anastomosis and ureteral stent placement. Patient readmitted on 4/20 with E. Coli septic shock s/p emergent Ex-lap with left nephrostomy tube placement. Post-op course complicated by respiratory failure eventually requiring indira/PEG on 5/2. On 5/3 the patient was found to have DVT in both upper extremities, and heparin drip was started. Starting 5/5, the patient had signifcant amount of rectal bleeding with drop in H/H requiring blood transfusion. Sigmoidoscopy on 5/13 and 5/21 showed a rectal ulcer with no active bleeding. She continues to have intermittent episodes of hematochezia. Patient had another episode hematochezia over night and received 1 unit of blood for a Hgb 6.8.  Plan for colonoscopy tomorrow.     Vent Mode: A/C  Oxygen Concentration (%):  [] 41  Resp Rate Total:  [20 br/min-36 br/min] 24 br/min  Vt Set:  [420 mL] 420 mL  PEEP/CPAP:  [8 cmH20] 8 cmH20  Pressure Support:  [10 cmH20] 10 cmH20  Mean Airway Pressure:  [9.8 cmH20-15 cmH20] 9.8 cmH20      LDA: None documented.       Peripheral IV - Single Lumen 05/23/19 0651 22 G Posterior;Right Hand (Active)   Number of days: 0            Peripheral IV - Single Lumen 05/23/19 0652 20 G Posterior;Right Upper Arm (Active)   Number of days: 0            Peripheral IV - Single Lumen 05/23/19 1130 20 G;1 3/4 in Right Forearm (Active)   Site Assessment  Clean;Dry;Intact;No redness;No swelling 5/23/2019 12:00 PM   Line Status Blood return noted;Flushed;Saline locked 5/23/2019 12:00 PM   Dressing Status Clean;Dry;Intact 5/23/2019 12:00 PM   Dressing Intervention New dressing 5/23/2019 12:00 PM   Site Change Due 05/27/19 5/23/2019 12:00 PM   Number of days: 0            Nephrostomy 04/21/19 0629 Left 8 Fr. (Active)   Urine Characteristics yellow;concentrated 5/23/2019  3:00 AM   Clamp Status unclamped 5/23/2019  3:00 AM   Characteristics dry 5/23/2019  3:00 AM   Drainage yellow drainage 5/23/2019  3:00 AM   Dressing transparent dressing 5/23/2019  3:00 AM   Dressing Type Protective barrier 5/23/2019  3:00 AM   Site Care cleansed w/ chlorahexadine 5/23/2019  3:00 AM   Dressing Change Due 05/26/19 5/22/2019  3:01 PM   Collection Container Standard drainage bag 5/23/2019  3:00 AM   Securement Method secured to lower ABD w/ tape 5/23/2019  3:00 AM   Output (mL) 0 mL 5/23/2019 12:00 PM   Number of days: 32            Gastrostomy/Enterostomy 05/02/19 1134 Percutaneous endoscopic gastrostomy (PEG) LUQ decompression;feeding (Active)   Securement secured to abdomen 5/23/2019  3:00 AM   Interventions Prior to Feeding patency checked 5/23/2019  3:00 AM   Suction Setting/Drainage Method continuous setting 5/14/2019  3:02 PM   Drainage serous 5/5/2019  3:00 AM   Feeding Type continuous 5/23/2019  3:00 AM   Clamp Status/Tolerance unclamped 5/23/2019  3:00 AM   Feeding Action feeding continued 5/23/2019  3:00 AM   Dressing dry and intact 5/23/2019  3:00 AM   Insertion Site no redness;no warmth;no drainage;no tenderness;no swelling 5/23/2019  3:00 AM   Site Care secured w/ tape 5/21/2019  3:00 AM   Flush/Irrigation flushed w/;water;no resistance met 5/22/2019  3:01 PM   Dressing Change Due 05/16/19 5/16/2019  3:01 PM   Current Rate (mL/hr) 40 mL/hr 5/22/2019  3:01 PM   Goal Rate (mL/hr) 40 mL/hr 5/22/2019  3:01 PM   Intake (mL) 300 mL 5/23/2019  1:00 PM   Water Bolus (mL) 300 mL  "5/23/2019 12:00 PM   Tube Output(mL)(Include Discarded Residual) 150 mL 5/3/2019  3:15 PM   Formula Name Peptamen Intense VHP 5/22/2019  3:01 PM   Tube Feeding Intake (mL) 0 5/23/2019 12:30 PM   Residual Amount (ml) 0 ml 5/22/2019  7:04 AM   Number of days: 21            Urethral Catheter 05/16/19 1040 16 Fr. (Active)   Site Assessment Clean;Intact 5/23/2019  3:00 AM   Collection Container Urimeter 5/23/2019  3:00 AM   Securement Method secured to top of thigh w/ adhesive device 5/23/2019  3:00 AM   Catheter Care Performed yes 5/23/2019  3:00 AM   Reason for Continuing Urinary Catheterization Critically ill in ICU requiring intensive monitoring 5/23/2019  3:00 AM   CAUTI Prevention Bundle StatLock in place w 1" slack;Intact seal between catheter & drainage tubing;Drainage bag off the floor;Green sheeting clip in use;No dependent loops or kinks;Drainage bag not overfilled (<2/3 full);Drainage bag below bladder 5/23/2019  3:00 AM   Output (mL) 90 mL 5/23/2019  1:00 PM   Number of days: 7       Prev airway: Easy mask; DL with Shahid 2 Grade I view.     Drips: None documented.      Patient Active Problem List   Diagnosis    Hypertension associated with diabetes    Hyperlipidemia    CAD (coronary artery disease)    Sleep apnea    Carotid stenosis, bilateral    Non compliance with medical treatment    PAPA (acute kidney injury)    Coronary stent restenosis    S/P coronary artery stent placement    Nuclear sclerosis - Right Eye    Primary localized osteoarthrosis, lower leg    Chronic sinusitis    PSC (posterior subcapsular cataract), right    DME (diabetic macular edema)    Refractive error    Pseudophakia    Senile nuclear sclerosis    Type 2 diabetes mellitus with diabetic peripheral angiopathy without gangrene    Diabetic peripheral neuropathy associated with type 2 diabetes mellitus    Cervical arthritis    Neurogenic claudication    Lumbar herniated disc    Fatty liver disease, nonalcoholic    " Arthritis of lumbar spine    Chronic pain    Lumbar radiculopathy    Lumbosacral radiculopathy at L5    Ureteral transection of left native kidney    Ureteral stent retained    Sepsis    Encephalopathy    Type 2 diabetes mellitus, with long-term current use of insulin    HLD (hyperlipidemia)    Asthma    Essential hypertension    Altered mental status    Encounter for weaning from ventilator    Metabolic acidosis    At risk for fluid volume overload    On enteral nutrition    Postoperative infection    Acute postoperative respiratory failure    Pulmonary edema    Anemia    Dermatitis associated with moisture    BRBPR (bright red blood per rectum)    Bradycardia    Delayed surgical wound healing    Urinary tract infection without hematuria    Rectal ulcer    Status post insertion of percutaneous endoscopic gastrostomy (PEG) tube       Review of patient's allergies indicates:  No Known Allergies    Current Inpatient Medications:   [START ON 5/26/2019] ceFAZolin (ANCEF) IVPB  2 g Intravenous Q8H    ceFEPime (MAXIPIME) IVPB  2 g Intravenous Q8H    chlorhexidine  15 mL Mouth/Throat BID    fluconazole  800 mg Per G Tube Daily    insulin aspart U-100  4 Units Subcutaneous Q24H    insulin aspart U-100  4 Units Subcutaneous Q24H    insulin aspart U-100  4 Units Subcutaneous Q24H    insulin aspart U-100  4 Units Subcutaneous Q24H    insulin aspart U-100  4 Units Subcutaneous Q24H    insulin aspart U-100  4 Units Subcutaneous Q24H    loperamide  2 mg Per G Tube QID    miconazole nitrate 2%   Topical (Top) BID    pantoprazole  40 mg Per G Tube Daily    [START ON 5/24/2019] polyethylene glycol  2,000 mL Per G Tube Once    rifAMpin (RIFADIN) IVPB  300 mg Intravenous Q12H    silodosin  4 mg Per G Tube Daily       Current Facility-Administered Medications on File Prior to Encounter   Medication Dose Route Frequency Provider Last Rate Last Dose    lidocaine (PF) 10 mg/ml (1%) injection 10  "mg  1 mL Intradermal Once Dave Bentley Jr., MD         Current Outpatient Medications on File Prior to Encounter   Medication Sig Dispense Refill    albuterol (PROVENTIL/VENTOLIN HFA) 90 mcg/actuation inhaler Inhale 2 puffs into the lungs every 6 (six) hours as needed for Wheezing. 1 each 11    amLODIPine (NORVASC) 10 MG tablet TAKE 1 TABLET (10 MG TOTAL) BY MOUTH ONCE DAILY. 30 tablet 6    aspirin 81 mg Tab Take 81 mg by mouth. 1 Tablet Oral Every day      atorvastatin (LIPITOR) 40 MG tablet TAKE 1 TABLET (40 MG TOTAL) BY MOUTH ONCE DAILY. 30 tablet 11    ACCU-CHEK EDIN PLUS METER [facility]       BD INSULIN PEN NEEDLE UF MINI 31 x 3/16 " Ndle USE 4 TIMES A  each 11    blood sugar diagnostic (ACCU-CHEK EDIN PLUS TEST STRP) Strp TEST BLOOD SUGARS 4 TIMES DAILY 150 strip 11    chlorthalidone (HYGROTEN) 50 MG Tab Take 1 tablet (50 mg total) by mouth once daily. 90 tablet 3    esomeprazole (NEXIUM) 40 MG capsule TAKE 1 CAPSULE (40 MG TOTAL) BY MOUTH BEFORE BREAKFAST. 90 capsule 3    fenofibrate micronized (LOFIBRA) 134 MG Cap TAKE 1 CAPSULE (134 MG TOTAL) BY MOUTH BEFORE BREAKFAST. 90 capsule 3    flash glucose scanning reader (FREESTYLE MISTI 14 DAY READER) Misc 1 each by Misc.(Non-Drug; Combo Route) route once daily. 2 each 11    flash glucose sensor (FREESTYLE MISTI 14 DAY SENSOR) Kit 1 each by Misc.(Non-Drug; Combo Route) route once daily. 2 kit 11    gabapentin (NEURONTIN) 100 MG capsule Take 2 capsules (200 mg total) by mouth every evening. 60 capsule 11    insulin aspart U-100 (NOVOLOG FLEXPEN U-100 INSULIN) 100 unit/mL InPn pen INJECT 35 U AM, 45 U AT NOON, 35 U PM.150-200 +2, 201-250 +4, 251-300 +6, 301-350 +8, >350 +10 30 Syringe 1    insulin glargine, TOUJEO, (TOUJEO SOLOSTAR U-300 INSULIN) 300 unit/mL (1.5 mL) InPn pen Inject 50 Units into the skin 2 (two) times daily. 6 Syringe 6    INVOKANA 300 mg Tab tablet Take 1 tablet (300 mg total) by mouth once daily. 30 tablet 6    " "irbesartan (AVAPRO) 300 MG tablet Take 1 tablet (300 mg total) by mouth every evening. 90 tablet 3    lancets 30 gauge Misc       metoprolol succinate (TOPROL-XL) 100 MG 24 hr tablet TAKE 1 TABLET (100 MG TOTAL) BY MOUTH ONCE DAILY. 30 tablet 11    nitroGLYCERIN (NITROSTAT) 0.4 MG SL tablet Take one every 2-3 min till chestpain relief for 3 times and if still no relief, call MD or come to ED 30 tablet 11    omega-3 acid ethyl esters (LOVAZA) 1 gram capsule Take 1 g by mouth once daily.       pen needle, diabetic (BD ULTRA-FINE GRABIEL PEN NEEDLES) 32 gauge x 5/32" Ndle For use with insulin pens daily 4 times a day 100 each 11    prasugrel (EFFIENT) 10 mg Tab TAKE 1 TABLET (10 MG TOTAL) BY MOUTH ONCE DAILY. 30 tablet 10    tamsulosin (FLOMAX) 0.4 mg Cap Take 1 capsule (0.4 mg total) by mouth every evening. 30 capsule 2    tramadol (ULTRAM) 50 mg tablet Take 1 tablet (50 mg total) by mouth 3 (three) times daily as needed for Pain. 60 tablet 1    TRULICITY 1.5 mg/0.5 mL PnIj INJECT 1.5 MG INTO THE SKIN EVERY 7 DAYS. 2 mL 6    zolpidem (AMBIEN) 5 MG Tab Take 1 tablet (5 mg total) by mouth nightly as needed. 30 tablet 2       Past Surgical History:   Procedure Laterality Date    BRONCHOSCOPY N/A 2019    Performed by Sean Ruano MD at [facility] OR 2ND FLR    CARDIAC CATHETERIZATION      cataract extraction left eye      cataracts      CATHETERIZATION, HEART, LEFT Left 2014    Performed by Wilman Kim MD at [facility] CATH LAB     SECTION, LOW TRANSVERSE      CORONARY ANGIOPLASTY      Creation, Nephrostomy, Percutaneous Left 2019    Performed by Karina Surgeon at [facility] KARINA    CREATION, TRACHEOSTOMY N/A 2019    Performed by Sean Ruano MD at [facility] OR 2ND FLR    EGD, WITH PEG TUBE INSERTION  2019    Performed by Sean Ruano MD at [facility] OR 2ND FLR    ESOPHAGOGASTRODUODENOSCOPY (EGD) N/A 2016    Performed by Gardenia Adamson MD at [facility] ENDO (4TH FLR)    " EXCISION TURBINATE, SUBMUCOUS      FUSION, SPINE, LUMBAR, ANTERIOR APPROACH Left L5-S1 Anterior to Psoa Interbody Fusion, L5-S1 Posterior Instrumentation Left 4/12/2019    Performed by Mk George MD at [facility] OR 2ND FLR    HAND SURGERY Left     HAND SURGERY Right     torn ligament repair/ Dr. Yeboah    HYSTERECTOMY      Injection,steroid,epidural,transforaminal approach - Bilateral - S1 Bilateral 9/25/2018    Performed by Tl Abreu MD at [facility] PAIN MGT    Injection-steroid-epidural-caudal N/A 2/7/2019    Performed by Dave Bentley Jr., MD at [facility] PAIN MGT    INSERTION-INTRAOCULAR LENS (IOL) Right 9/1/2015    Performed by Good Domingo MD at [facility] OR 1ST FLR    LAPAROTOMY, EXPLORATORY; LIGATION OF LEFT URETER; DOUBLE J STENT REMOVAL LEFT URETER Left 4/20/2019    Performed by Paul Carlson MD at [facility] OR 2ND FLR    left foot surgery      left wrist surgery      NASAL SEPTUM SURGERY  5/7/15    PHACOEMULSIFICATION-ASPIRATION-CATARACT Right 9/1/2015    Performed by Good Domingo MD at [facility] OR 1ST FLR    REPAIR, URETER  4/12/2019    Performed by Mk George MD at [facility] OR 2ND FLR    RESECTION-TURBINATES (SMR) N/A 5/7/2015    Performed by Dileep Dubois III, MD at [facility] OR 2ND FLR    rt elbow surgery      S/P LAD COATED STENT  05/14/2010    6 total     S/P OM1 STENT  08/2003    SEPTOPLASTY N/A 5/7/2015    Performed by Dileep Dubois III, MD at [facility] OR 2ND FLR    SIGMOIDOSCOPY, FLEXIBLE N/A 5/21/2019    Performed by ALBERTO Amin MD at [facility] ENDO (2ND FLR)    SIGMOIDOSCOPY, FLEXIBLE N/A 5/13/2019    Performed by ALBERTO Amin MD at [facility] ENDO (2ND FLR)    SINUS SURGERY      F.E.S.S.    SINUS SURGERY FUNCTIONAL ENDOSCOPIC WITH NAVIGATION WITH MAXILLARIES, ETHMOIDS, LEFT SPHENOID, LEFT LOLY N/A 5/7/2015    Performed by Dileep Dubois III, MD at [facility] OR 2ND FLR    STENT, URETERAL placement  4/12/2019    Performed by Robin Boyd MD at [facility] OR [address] [place] [address]     TUBAL LIGATION         Social History     Socioeconomic History    Marital status:      Spouse name: Shamir    Number of children: 2    Years of education: Not on file    Highest education level: Not on file   Occupational History    Occupation: [employer]     Employer: [employer]     Employer: [employer]     Employer: [employer]   Social Needs    Financial resource strain: Not on file    Food insecurity:     Worry: Not on file     Inability: Not on file    Transportation needs:     Medical: Not on file     Non-medical: Not on file   Tobacco Use    Smoking status: Never Smoker    Smokeless tobacco: Never Used   Substance and Sexual Activity    Alcohol use: No     Alcohol/week: 0.0 oz    Drug use: No    Sexual activity: Yes     Partners: Male     Birth control/protection: Post-menopausal     Comment:    Lifestyle    Physical activity:     Days per week: Not on file     Minutes per session: Not on file    Stress: Not on file   Relationships    Social connections:     Talks on phone: Not on file     Gets together: Not on file     Attends L[employer]-day service: Not on file     Active member of club or organization: Not on file     Attends meetings of clubs or organizations: Not on file     Relationship status: Not on file   Other Topics Concern    Are you pregnant or think you may be? No    Breast-feeding No   Social History Narrative    . 2 children.        OBJECTIVE:     Vital Signs Range (Last 24H):  Temp:  [36.8 °C (98.3 °F)-38.2 °C (100.8 °F)]   Pulse:  []   BP: ()/(52-84)   SpO2:  [94 %-100 %]       Significant Labs:  Lab Results   Component Value Date    WBC 14.12 (H) 05/23/2019    HGB 8.4 (L) 05/23/2019    HCT 26.0 (L) 05/23/2019     05/23/2019    CHOL 202 (H) 01/21/2019    TRIG 100 01/21/2019    HDL 48 01/21/2019    ALT 8 (L) 05/23/2019    AST 19 05/23/2019     05/23/2019    K 4.4 05/23/2019      05/23/2019    CREATININE 1.2 05/23/2019    BUN 38 (H) 05/23/2019    CO2 23 05/23/2019    TSH 1.170 10/22/2018    INR 1.1 05/23/2019    GLUF 217 (H) 09/15/2014    HGBA1C 10.8 (H) 02/19/2019       Diagnostic Studies: No relevant studies.    EKG:   Results for orders placed or performed during the hospital encounter of 04/20/19   EKG 12-lead    Collection Time: 05/21/19 11:08 AM    Narrative    Test Reason : R41.82    Vent. Rate : 084 BPM     Atrial Rate : 084 BPM     P-R Int : 146 ms          QRS Dur : 072 ms      QT Int : 386 ms       P-R-T Axes : 040 004 133 degrees     QTc Int : 456 ms    Normal sinus rhythm  Nonspecific T wave abnormality  Abnormal ECG  When compared with ECG of 08-MAY-2019 10:08,  Non-specific change in ST segment in Anterior leads  Nonspecific T wave abnormality, worse in Inferior leads  T wave inversion no longer evident in Anterior leads  Confirmed by VALENTINA ESPINO MD ([id number]) on 5/21/2019 3:56:52 PM    Referred By: [email or web address]   SELF           Confirmed By:VALENTINA ESPINO MD         2D ECHO 5/9/19:  · Low normal left ventricular systolic function. The estimated ejection fraction is 53%  · Normal LV diastolic function.  · Local segmental wall motion abnormalities.  · Normal right ventricular systolic function.  · Mild to mild-moderate (1-2+) mitral regurgitation.  · Mechanically ventilated; cannot use inferior caval vein diameter to estimate central venous pressure.      ASSESSMENT/PLAN:       Anesthesia Evaluation    I have reviewed the Patient Summary Reports.    I have reviewed the Nursing Notes.   I have reviewed the Medications.     Review of Systems  Anesthesia Hx:  No problems with previous Anesthesia  History of prior surgery of interest to airway management or planning: Personal Hx of Anesthesia complications, Post-Operative Nausea/Vomiting (Post-op nausea only ), in the past, but not with recent anesthetics / prophylaxis   Hematology/Oncology:     Oncology Normal    -- Anemia: Hematology  Comments: 8.4/26    EENT/Dental:EENT/Dental Normal   Cardiovascular:   Hypertension Past MI CAD      Pulmonary:   Asthma Sleep Apnea    Renal/:  Renal/ Normal     Hepatic/GI:   PUD, Liver Disease, (PFEIFFER)    Musculoskeletal:   Arthritis   Spine Disorders: lumbar    Neurological:   Neuromuscular Disease,    Endocrine:   Diabetes, type 2, using insulin        Physical Exam  General:  Obesity    Airway/Jaw/Neck:  Airway Findings: Tongue: Large Pre-Existing Airway Tube(s): Tracheostomy tube  Size: 8.0  General Airway Assessment: Adult  Mallampati: II  TM Distance: 4 - 6 cm   Trach collar      Chest/Lungs:  Chest/Lungs Findings: Clear to auscultation, Normal Respiratory Rate     Heart/Vascular:  Heart Findings: Rate: Normal  Rhythm: Regular Rhythm  Sounds: Normal  Heart murmur: negative       Mental Status:  Mental Status Findings:  Cooperative, Alert and Oriented         Anesthesia Plan  Type of Anesthesia, risks & benefits discussed:  Anesthesia Type:  general, MAC  Patient's Preference:   Intra-op Monitoring Plan: standard ASA monitors  Intra-op Monitoring Plan Comments:   Post Op Pain Control Plan: per primary service following discharge from PACU and multimodal analgesia  Post Op Pain Control Plan Comments:   Induction:   IV  Beta Blocker:  Patient is not currently on a Beta-Blocker (No further documentation required).       Informed Consent: Patient representative understands risks and agrees with Anesthesia plan.  Questions answered. Anesthesia consent signed with patient representative.  ASA Score: 3     Day of Surgery Review of History & Physical:            Ready For Surgery From Anesthesia Perspective.

## 2019-05-23 NOTE — H&P (VIEW-ONLY)
[facility]  Gastroenterology  Consult Note    Patient Name: Mariann Huff  MRN: [id number]  Admission Date: 4/20/2019  Hospital Length of Stay: 32 days  Code Status: Full Code   Attending Provider: Robin Boyd MD   Consulting Provider: Danita Dior MD  Primary Care Physician: Jasbir Haney MD  Principal Problem:Ureteral transection of left native kidney    Consults  Subjective:     HPI:  Mrs Huff is a 58 y/o female with past medical history of HTN, T2DM, CAD, who presented for L5-S1 OLIF with neurosurgery and complicated by left ureteral injury with ureteroureteral anastomosis and ureteral stent placement. The patient had a non-eventful immediate post operative course and got discharged. On 4/20, the patient was found altered with fevers, and found to have E.coli septic shock. She went to OR on 4/21, with emergent Ex-lap with left nephrostomy placement. She was on pressors briefly and antibiotics. She failed multiple trials to extubate, eventually undergoing trach/PEG on 5/2. On 5/3 the patient was found to have DVT in both upper extremities, and heparin drip was started. Starting 5/5, the patient had signifcant amount of rectal bleeding with drop in H/H requiring transfusions ~ every 3-5 days. CRS was consulted and bleeding was thought to be due to ulcer at site of rectal tube. A flex was done on 5/13 and a rectal ulcer was seen but with no active bleeding, although blood was seen in the colon. She had recurrent bleeding despite this. A flex sig was repeated, and the ulcer was again seen, reportedly unchanged with no active bleeding. Considerations for EGD/Colonoscopy was recommended if bleeding recurs.    Earlier toady the patient had x2 brown non-bloody BMs, with later having x2 bloody BMs.    The patient has a tracheostomy and cannot provide full history. She denies abdominal pain, and denies history of hematochezia in the past.  The patient is currently HD stable.  Withdrawal from PEG tube  with clear tube feeding.    A colonoscopy in  was normal with a fair prep. EGD in 2016 for dysphagia with a mild Schatzki's ring with no other abnormalities.    Hb earlier today is 8.4      Past Medical History:   Diagnosis Date    Anticoagulant long-term use     Asthma     Back pain     Bradycardia, unspecified 2019    The etiology of the bradycardic episode is unclear.  The have appear to be respiratory in origin (at least the 1st episode).  We will continue to monitor carefully.  We are awaiting evaluation by Cardiology.      CAD (coronary artery disease)     s/p stentimg  (2), (1)    Carotid artery stenosis     Diabetes mellitus type 2 in obese     HTN (hypertension), benign     Hyperlipidemia     Keloid cicatrix     NPDR (nonproliferative diabetic retinopathy) 2015    NSTEMI (non-ST elevated myocardial infarction)     Nuclear sclerosis - Right Eye 3/18/2014    Primary localized osteoarthrosis, lower leg 2014    Sleep apnea        Past Surgical History:   Procedure Laterality Date    BRONCHOSCOPY N/A 2019    Performed by [facility] at [facility] OR 2ND FLR    CARDIAC CATHETERIZATION      cataract extraction left eye      cataracts      CATHETERIZATION, HEART, LEFT Left 2014    Performed by Wilman Kim MD at [facility] CATH LAB     SECTION, LOW TRANSVERSE      CORONARY ANGIOPLASTY      Creation, Nephrostomy, Percutaneous Left 2019    Performed by Karina Surgeon at [facility] KARINA    CREATION, TRACHEOSTOMY N/A 2019    Performed by [facility] MD at [facility] OR 2ND FLR    EGD, WITH PEG TUBE INSERTION  2019    Performed by Sean [facility] MD at [facility] OR 2ND FLR    ESOPHAGOGASTRODUODENOSCOPY (EGD) N/A 2016    Performed by Gardenia Adamson MD at [facility] ENDO (4TH FLR)    EXCISION TURBINATE, SUBMUCOUS      FUSION, SPINE, LUMBAR, ANTERIOR APPROACH Left L5-S1 Anterior to Psoa Interbody Fusion, L5-S1 Posterior Instrumentation Left  4/12/2019    Performed by Mk George MD at [facility] OR 2ND FLR    HAND SURGERY Left     HAND SURGERY Right     torn ligament repair/ Dr. Yeboah    HYSTERECTOMY      Injection,steroid,epidural,transforaminal approach - Bilateral - S1 Bilateral 9/25/2018    Performed by Tl Abreu MD at [facility] PAIN MGT    Injection-steroid-epidural-caudal N/A 2/7/2019    Performed by Dave Bentley Jr., MD at [facility] PAIN MGT    INSERTION-INTRAOCULAR LENS (IOL) Right 9/1/2015    Performed by Good Domingo MD at [facility] OR 1ST FLR    LAPAROTOMY, EXPLORATORY; LIGATION OF LEFT URETER; DOUBLE J STENT REMOVAL LEFT URETER Left 4/20/2019    Performed by Paul Carlson MD at [facility] OR 2ND FLR    left foot surgery      left wrist surgery      NASAL SEPTUM SURGERY  5/7/15    PHACOEMULSIFICATION-ASPIRATION-CATARACT Right 9/1/2015    Performed by Good Domingo MD at [facility] OR 1ST FLR    REPAIR, URETER  4/12/2019    Performed by Mk George MD at [facility] OR 2ND FLR    RESECTION-TURBINATES (SMR) N/A 5/7/2015    Performed by Dileep Dubois III, MD at [facility] OR 2ND FLR    rt elbow surgery      S/P LAD COATED STENT  05/14/2010    6 total     S/P OM1 STENT  08/2003    SEPTOPLASTY N/A 5/7/2015    Performed by Dileep Dubois III, MD at [facility] OR 2ND FLR    SIGMOIDOSCOPY, FLEXIBLE N/A 5/21/2019    Performed by ALBERTO Amin MD at [facility] ENDO (2ND FLR)    SIGMOIDOSCOPY, FLEXIBLE N/A 5/13/2019    Performed by ALBERTO Amin MD at [facility] ENDO (2ND FLR)    SINUS SURGERY      F.E.S.S.    SINUS SURGERY FUNCTIONAL ENDOSCOPIC WITH NAVIGATION WITH MAXILLARIES, ETHMOIDS, LEFT SPHENOID, LEFT LOLY N/A 5/7/2015    Performed by Dileep Dubois III, MD at [facility] OR 2ND FLR    STENT, URETERAL placement  4/12/2019    [facility]    TUBAL LIGATION         Review of patient's allergies indicates:  No Known Allergies  Family History     Problem Relation (Age of Onset)    Diabetes Mother,  Father, Sister, Brother, Sister    Heart attack Father    Heart disease Mother    Leukemia Father    No Known Problems Sister, Brother, Brother, Maternal Grandmother, Maternal Grandfather, Paternal Grandmother, Paternal Grandfather, Son, Son, Maternal Aunt, Maternal Uncle, Paternal Aunt, Paternal Uncle        Tobacco Use    Smoking status: Never Smoker    Smokeless tobacco: Never Used   Substance and Sexual Activity    Alcohol use: No     Alcohol/week: 0.0 oz    Drug use: No    Sexual activity: Yes     Partners: Male     Birth control/protection: Post-menopausal     Comment:      Review of Systems   Unable to perform ROS: Other     Objective:     Vital Signs (Most Recent):  Temp: 98.3 °F (36.8 °C) (05/22/19 1900)  Pulse: 96 (05/22/19 2118)  Resp: (!) 28 (05/21/19 1500)  BP: (!) 142/64 (05/22/19 2100)  SpO2: 100 % (05/22/19 2118) Vital Signs (24h Range):  Temp:  [98.3 °F (36.8 °C)-100.8 °F (38.2 °C)] 98.3 °F (36.8 °C)  Pulse:  [] 96  SpO2:  [90 %-100 %] 100 %  BP: ()/(55-94) 142/64     Weight: 76 kg (167 lb 8.8 oz) (05/17/19 0500)  Body mass index is 27.88 kg/m².      Intake/Output Summary (Last 24 hours) at 5/22/2019 2148  Last data filed at 5/22/2019 2100  Gross per 24 hour   Intake 2550 ml   Output 2440 ml   Net 110 ml       Lines/Drains/Airways     Drain                 Nephrostomy 04/21/19 0629 Left 8 Fr. 31 days         Gastrostomy/Enterostomy 05/02/19 1134 Percutaneous endoscopic gastrostomy (PEG) LUQ decompression;feeding 20 days         Urethral Catheter 05/16/19 1040 16 Fr. 6 days          Airway                 Surgical Airway 05/02/19 1107 Shiley Cuffed 20 days          Peripheral Intravenous Line                 Midline Catheter Insertion/Assessment  - Single Lumen 05/07/19 1632 Left basilic vein (medial side of arm) 18g x 8cm 15 days         Peripheral IV - Single Lumen 05/21/19 0800 20 G Right Antecubital 1 day                Physical Exam   Constitutional: She is oriented to  person, place, and time. She appears ill. No distress.   Tracheostomy in place.   HENT:   Head: Normocephalic.   Eyes: Conjunctivae are normal. No scleral icterus.   Neck: Normal range of motion. Neck supple.   Cardiovascular: Normal rate and regular rhythm.   Pulmonary/Chest: Breath sounds normal.   Trachestomy on vent   Abdominal: Soft. Bowel sounds are normal. She exhibits no distension and no mass. There is no tenderness. There is no rebound and no guarding.   Red blood in BM with small amounts of brown stool   Musculoskeletal: Normal range of motion.   Neurological: She is alert and oriented to person, place, and time.   Skin: Skin is warm and dry. There is pallor.   Psychiatric: She has a normal mood and affect.       Significant Labs:  CBC:   Recent Labs   Lab 05/21/19  0339 05/22/19  0650 05/22/19  1815   WBC 20.27* 16.09* 18.30*   HGB 7.6* 7.6* 8.4*   HCT 23.9* 24.5* 27.0*    254 267     BMP:   Recent Labs   Lab 05/22/19  0450   *      K 4.1   *   CO2 22*   BUN 32*   CREATININE 0.7   CALCIUM 8.6*   MG 1.8     CMP:   Recent Labs   Lab 05/22/19  0450   *   CALCIUM 8.6*   ALBUMIN 1.5*   PROT 5.8*      K 4.1   CO2 22*   *   BUN 32*   CREATININE 0.7   ALKPHOS 116   ALT 8*   AST 23   BILITOT 0.6     Coagulation:   Recent Labs   Lab 05/22/19  0450   INR 1.0       Significant Imaging:  Imaging results within the past 24 hours have been reviewed.    Assessment/Plan:     BRBPR (bright red blood per rectum)  Patient is a 60 y/o female with complicated admission due to ureteral injury, currently with recurrent BRBPR.   Underwent flex sig x2 with rectal ulcer seen but no active bleeding, and per CRS team this was not thought to be cause of bleeding.  Consideration for EGD/Colonscopy was recommended.  Patient's bleeding is highly unlikely to be due to UGI bleeding. Likely source is colon, suspect ulcer and rectal trauma as cause of bleeding. Will continue to monitor to  determine need for colonoscopy, particularly as colonoscopy prep will be challenging in this patient who is bed bound on a ventilator.    Continue to monitor for recurrent bleeding  Trend H/H and transfuse as needed  Continue to hold heparin  Will continue to follow        Thank you for your consult. I will follow-up with patient. Please contact us if you have any additional questions.    Danita Dior MD  Gastroenterology  [facility]

## 2019-05-23 NOTE — PLAN OF CARE
05/23/19 1043   Discharge Assessment   Assessment Type Discharge Planning Reassessment   Discharge Plan A Long-term acute care facility (LTAC)   Discharge Plan B Rehab   DME Needed Upon Discharge  none   Patient/Family in Agreement with Plan yes     Pt is schedule for Nephrostomy placement in IR 5/24/2019 after CRS performs c-scope. Possible discharge to [facility] after or the following day. Insurance auth is pending. Awaiting medical stability confirmation after procedures. Will continue to follow and help Urology service, treatment team (SICU), and family with discharge planning and coordination.

## 2019-05-23 NOTE — SUBJECTIVE & OBJECTIVE
"Interval HPI:   Overnight events: NAEON. BG within goal ranges on current SQ insulin regimen.  Eating:   NPO  Nausea: No  Hypoglycemia and intervention: No  Fever: Yes (99.9)  TPN and/or TF: Yes  If yes, type of TF/TPN and rate: Peptamen Intense at 40 cc/hr.    BP (!) 115/55   Pulse 80   Temp 99.9 °F (37.7 °C) (Oral)   Resp (!) 28   Ht 5' 5" (1.651 m)   Wt 76 kg (167 lb 8.8 oz)   SpO2 99%   Breastfeeding? No   BMI 27.88 kg/m²     Labs Reviewed and Include    Recent Labs   Lab 05/23/19  0432   *   CALCIUM 8.7   ALBUMIN 1.4*   PROT 5.9*      K 4.4   CO2 23      BUN 38*   CREATININE 1.2   ALKPHOS 118   ALT 8*   AST 19   BILITOT 0.6     Lab Results   Component Value Date    WBC 16.64 (H) 05/23/2019    HGB 6.8 (L) 05/23/2019    HCT 21.7 (L) 05/23/2019    MCV 91 05/23/2019     05/23/2019     No results for input(s): TSH, FREET4 in the last 168 hours.  Lab Results   Component Value Date    HGBA1C 10.8 (H) 02/19/2019       Nutritional status:   Body mass index is 27.88 kg/m².  Lab Results   Component Value Date    ALBUMIN 1.4 (L) 05/23/2019    ALBUMIN 1.5 (L) 05/22/2019    ALBUMIN 1.4 (L) 05/21/2019     No results found for: PREALBUMIN    Estimated Creatinine Clearance: 52.1 mL/min (based on SCr of 1.2 mg/dL).    Accu-Checks  Recent Labs     05/21/19  2019 05/22/19  0119 05/22/19  0508 05/22/19  0815 05/22/19  1218 05/22/19  1558 05/22/19  2013 05/23/19  0007 05/23/19  0433 05/23/19  0841   POCTGLUCOSE 100 142* 133* 136* 132* 135* 138* 108 127* 159*       Current Medications and/or Treatments Impacting Glycemic Control  Immunotherapy:    Immunosuppressants     None        Steroids:   Hormones (From admission, onward)    None        Pressors:    Autonomic Drugs (From admission, onward)    None        Hyperglycemia/Diabetes Medications:   Antihyperglycemics (From admission, onward)    Start     Stop Route Frequency Ordered    05/23/19 1200  insulin aspart U-100 pen 4 Units      -- SubQ Every " 24 hours (non-standard times) 05/22/19 1554    05/23/19 0800  insulin aspart U-100 pen 4 Units      -- SubQ Every 24 hours (non-standard times) 05/22/19 1554    05/23/19 0400  insulin aspart U-100 pen 4 Units      -- SubQ Every 24 hours (non-standard times) 05/22/19 1554    05/23/19 0000  insulin aspart U-100 pen 4 Units      -- SubQ Every 24 hours (non-standard times) 05/22/19 1554    05/22/19 2000  insulin aspart U-100 pen 4 Units      -- SubQ Every 24 hours (non-standard times) 05/22/19 1554    05/22/19 1600  insulin aspart U-100 pen 4 Units      -- SubQ Every 24 hours (non-standard times) 05/22/19 1554    05/20/19 1647  insulin aspart U-100 pen 1-10 Units      -- SubQ Every 6 hours PRN 05/20/19 1545

## 2019-05-23 NOTE — CONSULTS
Radiology Consult    Mariann Huff is a 59 y.o. female with a history of hydronephrosis s/p nephrostomy placement, now with leaking around the drain.    Past Medical History:   Diagnosis Date    Anticoagulant long-term use     Asthma     Back pain     Bradycardia, unspecified 2019    The etiology of the bradycardic episode is unclear.  The have appear to be respiratory in origin (at least the 1st episode).  We will continue to monitor carefully.  We are awaiting evaluation by Cardiology.      CAD (coronary artery disease)     s/p stentimg  (2), (1)    Carotid artery stenosis     Diabetes mellitus type 2 in obese     HTN (hypertension), benign     Hyperlipidemia     Keloid cicatrix     NPDR (nonproliferative diabetic retinopathy) 2015    NSTEMI (non-ST elevated myocardial infarction)     Nuclear sclerosis - Right Eye 3/18/2014    Primary localized osteoarthrosis, lower leg 2014    Sleep apnea      Past Surgical History:   Procedure Laterality Date    BRONCHOSCOPY N/A 2019    Performed by [facility] at [facility] OR 2ND FLR    CARDIAC CATHETERIZATION      cataract extraction left eye      cataracts      CATHETERIZATION, HEART, LEFT Left 2014    Performed by Wilman Kim MD at [facility] CATH LAB     SECTION, LOW TRANSVERSE      CORONARY ANGIOPLASTY      Creation, Nephrostomy, Percutaneous Left 2019    Performed by Karina Surgeon at [facility] KARINA    CREATION, TRACHEOSTOMY N/A 2019    Performed by [facility] MD at [facility] OR 2ND FLR    EGD, WITH PEG TUBE INSERTION  2019    Performed by Sean [facility] MD at [facility] OR 2ND FLR    ESOPHAGOGASTRODUODENOSCOPY (EGD) N/A 2016    Performed by Gardenia Adamson MD at [facility] ENDO (4TH FLR)    EXCISION TURBINATE, SUBMUCOUS      FUSION, SPINE, LUMBAR, ANTERIOR APPROACH Left L5-S1 Anterior to Psoa Interbody Fusion, L5-S1 Posterior Instrumentation Left 2019    Performed by Mk LUX  MD Ariel at [facility] OR 2ND FLR    HAND SURGERY Left     HAND SURGERY Right     torn ligament repair/ Dr. Yeboah    HYSTERECTOMY      Injection,steroid,epidural,transforaminal approach - Bilateral - S1 Bilateral 9/25/2018    Performed by Tl Abreu MD at [facility] PAIN MGT    Injection-steroid-epidural-caudal N/A 2/7/2019    Performed by Dave Bentley Jr., MD at [facility] PAIN MGT    INSERTION-INTRAOCULAR LENS (IOL) Right 9/1/2015    Performed by Good Domingo MD at [facility] OR 1ST FLR    LAPAROTOMY, EXPLORATORY; LIGATION OF LEFT URETER; DOUBLE J STENT REMOVAL LEFT URETER Left 4/20/2019    Performed by Paul Carlson MD at [facility] OR [address] [place] [address]    left foot surgery      left wrist surgery      NASAL SEPTUM SURGERY  5/7/15    PHACOEMULSIFICATION-ASPIRATION-CATARACT Right 9/1/2015    Performed by Good Domingo MD at [facility] OR [facility] FLR    REPAIR, URETER  4/12/2019    Performed by Mk George MD at [facility] OR 2ND FLR    RESECTION-TURBINATES (SMR) N/A 5/7/2015    Performed by Dileep Dubois III, MD at [facility] OR 2ND FLR    rt elbow surgery      S/P LAD COATED STENT  05/14/2010    6 total     S/P OM1 STENT  08/2003    SEPTOPLASTY N/A 5/7/2015    Performed by Dileep Dubois III, MD at [facility] OR 2ND FLR    SIGMOIDOSCOPY, FLEXIBLE N/A 5/21/2019    Performed by ALBERTO Amin MD at [facility] ENDO (2ND FLR)    SIGMOIDOSCOPY, FLEXIBLE N/A 5/13/2019    Performed by ALBERTO Amin MD at [facility] ENDO (2ND FLR)    SINUS SURGERY      F.E.S.S.    SINUS SURGERY FUNCTIONAL ENDOSCOPIC WITH NAVIGATION WITH MAXILLARIES, ETHMOIDS, LEFT SPHENOID, LEFT LOLY N/A 5/7/2015    Performed by Dileep Dubois III, MD at [facility] OR 2ND FLR    STENT, URETERAL placement  4/12/2019    [facility]    TUBAL LIGATION         Discussed with primary team.    Imaging reviewed with Radiology staff.     Procedure: Nephrostomy exchange    Scheduled Meds:    [START ON 5/26/2019] ceFAZolin (ANCEF)  "IVPB  2 g Intravenous Q8H    ceFEPime (MAXIPIME) IVPB  2 g Intravenous Q8H    chlorhexidine  15 mL Mouth/Throat BID    fluconazole  800 mg Per G Tube Daily    insulin aspart U-100  4 Units Subcutaneous Q24H    insulin aspart U-100  4 Units Subcutaneous Q24H    insulin aspart U-100  4 Units Subcutaneous Q24H    insulin aspart U-100  4 Units Subcutaneous Q24H    insulin aspart U-100  4 Units Subcutaneous Q24H    insulin aspart U-100  4 Units Subcutaneous Q24H    loperamide  2 mg Per G Tube QID    miconazole nitrate 2%   Topical (Top) BID    pantoprazole  40 mg Per G Tube Daily    rifAMpin (RIFADIN) IVPB  300 mg Intravenous Q12H    silodosin  4 mg Per G Tube Daily     Continuous Infusions:   PRN Meds:sodium chloride, acetaminophen, albuterol-ipratropium, dextrose 50%, glucagon (human recombinant), hydrALAZINE, HYDROmorphone, hydrOXYzine, insulin aspart U-100, labetalol, magnesium sulfate IVPB, magnesium sulfate IVPB, ondansetron, oxyCODONE, promethazine (PHENERGAN) IVPB, sodium chloride 0.9%    Allergies: Review of patient's allergies indicates:  No Known Allergies    Labs:  Recent Labs   Lab 05/23/19 0432   INR 1.1       Recent Labs   Lab 05/23/19 0432   WBC 16.64*   HGB 6.8*   HCT 21.7*   MCV 91         Recent Labs   Lab 05/23/19 0432   *      K 4.4      CO2 23   BUN 38*   CREATININE 1.2   CALCIUM 8.7   MG 1.6   ALT 8*   AST 19   ALBUMIN 1.4*   BILITOT 0.6         Vitals (Most Recent):  Temp: 99.9 °F (37.7 °C) (05/23/19 0715)  Pulse: 80 (05/23/19 0745)  Resp: (!) 28 (05/21/19 1500)  BP: (!) 115/55 (05/23/19 0730)  SpO2: 99 % (05/23/19 0745)    Plan:   1. NPO after midnight.  2. Hold anticoagulants.  3. Will plan on nephrostomy follow c-scope tomorrow    Reji Phipps MD ([place])  Radiology PGY-5  [phone]      "

## 2019-05-23 NOTE — SUBJECTIVE & OBJECTIVE
Interval History: No acute events. Afebrile, WBC stable. No more bloody BM. H&H stable. Patient has been getting prep for colonoscopy through G tube.     Review of Systems  Objective:     Temp:  [98.3 °F (36.8 °C)-100.8 °F (38.2 °C)] 99.5 °F (37.5 °C)  Pulse:  [] 85  SpO2:  [90 %-100 %] 98 %  BP: ()/(52-94) 108/54     Body mass index is 27.88 kg/m².      Bladder Scan Volume (mL): 27 mL (05/10/19 0846)  Post Void Cath Residual Output (mL): 27 mL (05/10/19 0846)    Drains     Drain                 Nephrostomy 04/21/19 0629 Left 8 Fr. 32 days         Gastrostomy/Enterostomy 05/02/19 1134 Percutaneous endoscopic gastrostomy (PEG) LUQ decompression;feeding 20 days         Urethral Catheter 05/16/19 1040 16 Fr. 6 days                Physical Exam   Constitutional: She appears well-developed. No distress.   HENT:   Head: Normocephalic and atraumatic.   Neck: No JVD present.   Cardiovascular: Normal rate and regular rhythm.    Pulmonary/Chest: Effort normal. No respiratory distress.   On vent   Abdominal: Soft. She exhibits no distension. There is no rebound and no guarding.   Incision c/d/i   G-tube   Genitourinary:   Genitourinary Comments: Fontenot in place draining clear yellow urine  Left neph tube with clear yellow urine   Neurological: She is alert.   Skin: Skin is warm and dry. She is not diaphoretic. No pallor.         Significant Labs:    BMP:  Recent Labs   Lab 05/21/19  0339 05/22/19  0450 05/23/19  0432    142 138   K 4.3 4.1 4.4   * 112* 109   CO2 20* 22* 23   BUN 39* 32* 38*   CREATININE 0.8 0.7 1.2   CALCIUM 8.7 8.6* 8.7       CBC:   Recent Labs   Lab 05/22/19  0650 05/22/19  1815 05/23/19  0432   WBC 16.09* 18.30* 16.64*   HGB 7.6* 8.4* 6.8*   HCT 24.5* 27.0* 21.7*    267 740     Significant Imaging:  All pertinent imaging results/findings from the past 24 hours have been reviewed.

## 2019-05-23 NOTE — HPI
Mrs Huff is a 60 y/o female with past medical history of HTN, T2DM, CAD, who presented for L5-S1 OLIF with neurosurgery and complicated by left ureteral injury with ureteroureteral anastomosis and ureteral stent placement. The patient had a non-eventful immediate post operative course and got discharged. On 4/20, the patient was found altered with fevers, and found to have E.coli septic shock. She went to OR on 4/21, with emergent Ex-lap with left nephrostomy placement. She was on pressors briefly and antibiotics. She failed multiple trials to extubate, eventually undergoing trach/PEG on 5/2. On 5/3 the patient was found to have DVT in both upper extremities, and heparin drip was started. Starting 5/5, the patient had signifcant amount of rectal bleeding with drop in H/H requiring transfusions ~ every 3-5 days. CRS was consulted and bleeding was thought to be due to ulcer at site of rectal tube. A flex was done on 5/13 and a rectal ulcer was seen but with no active bleeding, although blood was seen in the colon. She had recurrent bleeding despite this. A flex sig was repeated, and the ulcer was again seen, reportedly unchanged with no active bleeding. Considerations for EGD/Colonoscopy was recommended if bleeding recurs.    Earlier toady the patient had x2 brown non-bloody BMs, with later having x2 bloody BMs.    The patient has a tracheostomy and cannot provide full history. She denies abdominal pain, and denies history of hematochezia in the past.  The patient is currently HD stable.  Withdrawal from PEG tube with clear tube feeding.    A colonoscopy in 2010 was normal with a fair prep. EGD in 2016 for dysphagia with a mild Schatzki's ring with no other abnormalities.    Hb earlier today is 8.4

## 2019-05-23 NOTE — PROGRESS NOTES
Interval History/Significant Events: Episode of rectal bleeding overnight. Hgb 6.8 - 1U PRBC. UOP adequate.     Follow-up For: Procedure(s) (LRB):  SIGMOIDOSCOPY, FLEXIBLE (N/A)    Post-Operative Day: 1 Day Post-Op    Objective:     Vital Signs (Most Recent):  Temp: 99.9 °F (37.7 °C) (05/23/19 0924)  Pulse: 81 (05/23/19 0930)  Resp: (!) 28 (05/21/19 1500)  BP: (!) 142/63 (05/23/19 0930)  SpO2: 99 % (05/23/19 0930) Vital Signs (24h Range):  Temp:  [98.3 °F (36.8 °C)-100.8 °F (38.2 °C)] 99.9 °F (37.7 °C)  Pulse:  [] 81  SpO2:  [90 %-100 %] 99 %  BP: ()/(52-94) 142/63     Weight: 76 kg (167 lb 8.8 oz)  Body mass index is 27.88 kg/m².      Intake/Output Summary (Last 24 hours) at 5/23/2019 1028  Last data filed at 5/23/2019 0924  Gross per 24 hour   Intake 2888 ml   Output 2825 ml   Net 63 ml       Physical Exam   Constitutional: She appears well-developed and well-nourished.   HENT:   Head: Normocephalic and atraumatic.   Tracheostomy in place   Eyes: Right eye exhibits no discharge. Left eye exhibits no discharge.   Neck: Normal range of motion. Neck supple.   Cardiovascular: Normal rate and regular rhythm.   Pulmonary/Chest: Effort normal. No respiratory distress.   Abdominal: Soft.   Midline incision c/d/i.  PEG with no leak. Abdomen soft, non-distended.  Bowel sounds present   Genitourinary:   Genitourinary Comments: Nephrostomy tube in place with yellow output.  Fontenot in place  No rectal bleeding since partial colonoscopy   Musculoskeletal: Normal range of motion.   Neurological: She is alert.   Able to follow commands, denying pain.    Skin: Skin is warm and dry.   Psychiatric: She has a normal mood and affect.   Nursing note and vitals reviewed.      Vents:  Vent Mode: A/C (05/23/19 0930)  Ventilator Initiated: Yes (05/08/19 0630)  Set Rate: 20 bmp (05/23/19 0930)  Vt Set: 420 mL (05/23/19 0930)  Pressure Support: 10 cmH20 (05/23/19 0930)  PEEP/CPAP: 8 cmH20 (05/23/19 0930)  Oxygen Concentration  (%): 40 (05/23/19 0930)  Peak Airway Pressure: 24 cmH2O (05/23/19 0930)  Plateau Pressure: 0 cmH20 (05/23/19 0930)  Total Ve: 8.8 mL (05/23/19 0930)  Negative Inspiratory Force (cm H2O): -18 (04/27/19 1306)  F/VT Ratio<105 (RSBI): 173.91 (05/21/19 1500)    Lines/Drains/Airways     Drain                 Nephrostomy 04/21/19 0629 Left 8 Fr. 32 days         Gastrostomy/Enterostomy 05/02/19 1134 Percutaneous endoscopic gastrostomy (PEG) LUQ decompression;feeding 20 days         Urethral Catheter 05/16/19 1040 16 Fr. 6 days          Airway                 Surgical Airway 05/02/19 1107 Shiley Cuffed 20 days          Peripheral Intravenous Line                 Peripheral IV - Single Lumen 05/23/19 0651 22 G Posterior;Right Hand less than 1 day         Peripheral IV - Single Lumen 05/23/19 0652 20 G Posterior;Right Upper Arm less than 1 day                Significant Labs:    CBC/Anemia Profile:  Recent Labs   Lab 05/22/19  0650 05/22/19  1815 05/23/19  0432   WBC 16.09* 18.30* 16.64*   HGB 7.6* 8.4* 6.8*   HCT 24.5* 27.0* 21.7*    267 246   MCV 93 92 91   RDW 14.9* 14.6* 14.7*        Chemistries:  Recent Labs   Lab 05/22/19  0450 05/23/19  0432    138   K 4.1 4.4   * 109   CO2 22* 23   BUN 32* 38*   CREATININE 0.7 1.2   CALCIUM 8.6* 8.7   ALBUMIN 1.5* 1.4*   PROT 5.8* 5.9*   BILITOT 0.6 0.6   ALKPHOS 116 118   ALT 8* 8*   AST 23 19   MG 1.8 1.6   PHOS 2.5* 3.4       All pertinent labs within the past 24 hours have been reviewed.    Significant Imaging:  I have reviewed all pertinent imaging results/findings within the past 24 hours.      Scheduled Meds:   [START ON 5/26/2019] ceFAZolin (ANCEF) IVPB  2 g Intravenous Q8H    ceFEPime (MAXIPIME) IVPB  2 g Intravenous Q8H    chlorhexidine  15 mL Mouth/Throat BID    fluconazole  800 mg Per G Tube Daily    insulin aspart U-100  4 Units Subcutaneous Q24H    insulin aspart U-100  4 Units Subcutaneous Q24H    insulin aspart U-100  4 Units Subcutaneous Q24H     insulin aspart U-100  4 Units Subcutaneous Q24H    insulin aspart U-100  4 Units Subcutaneous Q24H    insulin aspart U-100  4 Units Subcutaneous Q24H    loperamide  2 mg Per G Tube QID    miconazole nitrate 2%   Topical (Top) BID    pantoprazole  40 mg Per G Tube Daily    polyethylene glycol  2,000 mL Per G Tube Once    [START ON 5/24/2019] polyethylene glycol  2,000 mL Per G Tube Once    rifAMpin (RIFADIN) IVPB  300 mg Intravenous Q12H    silodosin  4 mg Per G Tube Daily     Continuous Infusions:  PRN Meds:sodium chloride, acetaminophen, albuterol-ipratropium, dextrose 50%, glucagon (human recombinant), hydrALAZINE, HYDROmorphone, hydrOXYzine, insulin aspart U-100, labetalol, magnesium sulfate IVPB, magnesium sulfate IVPB, ondansetron, oxyCODONE, promethazine (PHENERGAN) IVPB, sodium chloride 0.9%    Vital signs in last 24 hours:  Temp:  [98.3 °F (36.8 °C)-100.8 °F (38.2 °C)] 99.9 °F (37.7 °C)  Pulse:  [] 81  SpO2:  [90 %-100 %] 99 %  BP: ()/(52-94) 142/63    Intake/Output last 3 shifts:  I/O last 3 completed shifts:  In: 3523 [I.V.:413; NG/GT:3010; IV Piggyback:100]  Out: 3380 [Urine:3380]  Intake/Output this shift:  I/O this shift:  In: 830 [I.V.:100; Blood:350; NG/GT:380]  Out: 150 [Urine:150]    Problem Assessment/Plan  Orthopedic  * Ureteral transection of left native kidney  Assessment & Plan  Mariann CROW Refugio is a 59 y.o. female s/p left ureteral injury on 4/12, who presented with fever, AMS, and intraabdominal abscess, taken for washout and ligation of left ureter with nephrostomy tube placement on 4/21. S/p Trach/PEG on 5/2. Episode of negative pressure pulmonary edema on 5/8 requiring mechanical ventilation, diuresis and low dose pressor. Has been plagued by intermittent BRBPR.     Neuro:   - off sedation  - responds to questions appropriately by nodding  - no focal deficit     Pulmonary:   PAV+; try trach collar today  - ABGs prn  - diuresis as tolerates     Cardiac:   -  alternating HTN and hypotension  - TTE 5/8 with no evidence of right heart strain or significant valvular pathology, normal LV systolic function, EF 70%     Renal:    - S/p transection of left ureter 4/12 and subsequent ligation and PCT nephrostomy tube placement with IR 4/21  - Fontenot removed 5/15, but then replaced for retention   - UO adequate  - BUN/Cr stable  - Monitor I/Os   - IR for nephrostomy tomorrow 5/24 after C scope        ID:   - AF  - EBC down  - Blood 5/10 NGTD  - Ucx 5/13 candiduria  - BAL 5/18 pseudomonas  - C diff 5/5 negative  - cefepime and rifampin (spine hardware)  - vanc stopped     Hem/Onc:   - Hgb 6.8 overnight - 1U PRBC given       Endocrine:  - DM Type 2  - TF @ goal  - LDSSI  - Endocrine following for assistance with blood glucose control.      Fluids/Electrolytes/Nutrition/GI:   - Free water flushes 300 cc q6h  - Replace electrolytes PRN     # rectal ulcers  - intermittent hematochezia; ulcers seen to be improving on partial colonoscopy 5/21  - no bleeding in last 24 hours  - anoscopy and hemostatic agent applied by CRS 5/15  - imodium QID  - C scope by CRS tomorrow 5/24     PPx:  - DVT: Lovenox, Hep gtt held for continued bloody BMs        DISPO:  - Continue SICU care  - CRS to perform C scope tomorrow 5/24  - IR to perform nephrostomy tomorrow 5/24 after C scope              Critical secondary to Patient has a condition that poses threat to life and bodily function: Severe Respiratory Distress and rectal bleeding     Critical care was time spent personally by me on the following activities: development of treatment plan with patient or surrogate and bedside caregivers, discussions with consultants, evaluation of patient's response to treatment, examination of patient, ordering and performing treatments and interventions, ordering and review of laboratory studies, ordering and review of radiographic studies, pulse oximetry, re-evaluation of patient's condition.  This critical care time  did not overlap with that of any other provider or involve time for any procedures.    Ehsan Espino MD  PGY2/CA1  Department of Anesthesiology  Pager: [phone]

## 2019-05-23 NOTE — ASSESSMENT & PLAN NOTE
BG goal 140 - 180    Novolog 4 units q 4 hrs.  Hold if TFs are stopped.  Moderate Dose SQ Insulin Correction Scale PRN for BG excursions. Patient has proven insulin resistance during admission. Cr WNL  BG monitoring q 4 hrs.     ** Please notify Endocrine for any change and/or advance in diet/TF**  ** Please call Endocrine for any BG related issues **    Discharge Recommendations:     TBD.

## 2019-05-24 ENCOUNTER — ANESTHESIA (OUTPATIENT)
Dept: ENDOSCOPY | Facility: HOSPITAL | Age: 59
DRG: 003 | End: 2019-05-24
Payer: MEDICARE

## 2019-05-24 LAB
ABO + RH BLD: NORMAL
ALBUMIN SERPL BCP-MCNC: 1.4 G/DL (ref 3.5–5.2)
ALBUMIN SERPL BCP-MCNC: 1.5 G/DL (ref 3.5–5.2)
ALP SERPL-CCNC: 119 U/L (ref 55–135)
ALP SERPL-CCNC: 120 U/L (ref 55–135)
ALT SERPL W/O P-5'-P-CCNC: 7 U/L (ref 10–44)
ALT SERPL W/O P-5'-P-CCNC: 9 U/L (ref 10–44)
ANION GAP SERPL CALC-SCNC: 11 MMOL/L (ref 8–16)
ANION GAP SERPL CALC-SCNC: 9 MMOL/L (ref 8–16)
AST SERPL-CCNC: 20 U/L (ref 10–40)
AST SERPL-CCNC: 21 U/L (ref 10–40)
BACTERIA BLD CULT: NORMAL
BASOPHILS # BLD AUTO: 0.06 K/UL (ref 0–0.2)
BASOPHILS # BLD AUTO: 0.08 K/UL (ref 0–0.2)
BASOPHILS NFR BLD: 0.4 % (ref 0–1.9)
BASOPHILS NFR BLD: 0.6 % (ref 0–1.9)
BILIRUB SERPL-MCNC: 0.6 MG/DL (ref 0.1–1)
BILIRUB SERPL-MCNC: 0.6 MG/DL (ref 0.1–1)
BLD GP AB SCN CELLS X3 SERPL QL: NORMAL
BUN SERPL-MCNC: 28 MG/DL (ref 6–20)
BUN SERPL-MCNC: 32 MG/DL (ref 6–20)
CALCIUM SERPL-MCNC: 8.8 MG/DL (ref 8.7–10.5)
CALCIUM SERPL-MCNC: 9 MG/DL (ref 8.7–10.5)
CHLORIDE SERPL-SCNC: 111 MMOL/L (ref 95–110)
CHLORIDE SERPL-SCNC: 111 MMOL/L (ref 95–110)
CO2 SERPL-SCNC: 19 MMOL/L (ref 23–29)
CO2 SERPL-SCNC: 22 MMOL/L (ref 23–29)
CREAT SERPL-MCNC: 0.8 MG/DL (ref 0.5–1.4)
CREAT SERPL-MCNC: 0.9 MG/DL (ref 0.5–1.4)
DIFFERENTIAL METHOD: ABNORMAL
DIFFERENTIAL METHOD: ABNORMAL
EOSINOPHIL # BLD AUTO: 1 K/UL (ref 0–0.5)
EOSINOPHIL # BLD AUTO: 1 K/UL (ref 0–0.5)
EOSINOPHIL NFR BLD: 6.9 % (ref 0–8)
EOSINOPHIL NFR BLD: 7.2 % (ref 0–8)
ERYTHROCYTE [DISTWIDTH] IN BLOOD BY AUTOMATED COUNT: 14.6 % (ref 11.5–14.5)
ERYTHROCYTE [DISTWIDTH] IN BLOOD BY AUTOMATED COUNT: 14.8 % (ref 11.5–14.5)
EST. GFR  (AFRICAN AMERICAN): >60 ML/MIN/1.73 M^2
EST. GFR  (AFRICAN AMERICAN): >60 ML/MIN/1.73 M^2
EST. GFR  (NON AFRICAN AMERICAN): >60 ML/MIN/1.73 M^2
EST. GFR  (NON AFRICAN AMERICAN): >60 ML/MIN/1.73 M^2
ESTIMATED AVG GLUCOSE: 108 MG/DL (ref 68–131)
GLUCOSE SERPL-MCNC: 100 MG/DL (ref 70–110)
GLUCOSE SERPL-MCNC: 91 MG/DL (ref 70–110)
HBA1C MFR BLD HPLC: 5.4 % (ref 4–5.6)
HCT VFR BLD AUTO: 24.9 % (ref 37–48.5)
HCT VFR BLD AUTO: 24.9 % (ref 37–48.5)
HGB BLD-MCNC: 8 G/DL (ref 12–16)
HGB BLD-MCNC: 8.1 G/DL (ref 12–16)
IMM GRANULOCYTES # BLD AUTO: 0.15 K/UL (ref 0–0.04)
IMM GRANULOCYTES # BLD AUTO: 0.15 K/UL (ref 0–0.04)
IMM GRANULOCYTES NFR BLD AUTO: 1.1 % (ref 0–0.5)
IMM GRANULOCYTES NFR BLD AUTO: 1.1 % (ref 0–0.5)
INR PPP: 1.1 (ref 0.8–1.2)
LYMPHOCYTES # BLD AUTO: 1.5 K/UL (ref 1–4.8)
LYMPHOCYTES # BLD AUTO: 1.5 K/UL (ref 1–4.8)
LYMPHOCYTES NFR BLD: 10.7 % (ref 18–48)
LYMPHOCYTES NFR BLD: 10.8 % (ref 18–48)
MAGNESIUM SERPL-MCNC: 1.8 MG/DL (ref 1.6–2.6)
MCH RBC QN AUTO: 28.9 PG (ref 27–31)
MCH RBC QN AUTO: 29 PG (ref 27–31)
MCHC RBC AUTO-ENTMCNC: 32.1 G/DL (ref 32–36)
MCHC RBC AUTO-ENTMCNC: 32.5 G/DL (ref 32–36)
MCV RBC AUTO: 89 FL (ref 82–98)
MCV RBC AUTO: 90 FL (ref 82–98)
MONOCYTES # BLD AUTO: 1.1 K/UL (ref 0.3–1)
MONOCYTES # BLD AUTO: 1.2 K/UL (ref 0.3–1)
MONOCYTES NFR BLD: 7.9 % (ref 4–15)
MONOCYTES NFR BLD: 8.7 % (ref 4–15)
NEUTROPHILS # BLD AUTO: 10.3 K/UL (ref 1.8–7.7)
NEUTROPHILS # BLD AUTO: 9.6 K/UL (ref 1.8–7.7)
NEUTROPHILS NFR BLD: 71.6 % (ref 38–73)
NEUTROPHILS NFR BLD: 73 % (ref 38–73)
NRBC BLD-RTO: 0 /100 WBC
NRBC BLD-RTO: 0 /100 WBC
PHOSPHATE SERPL-MCNC: 2.8 MG/DL (ref 2.7–4.5)
PLATELET # BLD AUTO: 223 K/UL (ref 150–350)
PLATELET # BLD AUTO: 231 K/UL (ref 150–350)
PMV BLD AUTO: 10.7 FL (ref 9.2–12.9)
PMV BLD AUTO: 10.7 FL (ref 9.2–12.9)
POCT GLUCOSE: 107 MG/DL (ref 70–110)
POCT GLUCOSE: 109 MG/DL (ref 70–110)
POCT GLUCOSE: 111 MG/DL (ref 70–110)
POCT GLUCOSE: 93 MG/DL (ref 70–110)
POTASSIUM SERPL-SCNC: 3.8 MMOL/L (ref 3.5–5.1)
POTASSIUM SERPL-SCNC: 3.9 MMOL/L (ref 3.5–5.1)
PROT SERPL-MCNC: 6 G/DL (ref 6–8.4)
PROT SERPL-MCNC: 6.2 G/DL (ref 6–8.4)
PROTHROMBIN TIME: 11.5 SEC (ref 9–12.5)
RBC # BLD AUTO: 2.76 M/UL (ref 4–5.4)
RBC # BLD AUTO: 2.8 M/UL (ref 4–5.4)
SODIUM SERPL-SCNC: 141 MMOL/L (ref 136–145)
SODIUM SERPL-SCNC: 142 MMOL/L (ref 136–145)
WBC # BLD AUTO: 13.39 K/UL (ref 3.9–12.7)
WBC # BLD AUTO: 14.11 K/UL (ref 3.9–12.7)

## 2019-05-24 PROCEDURE — 63600175 PHARM REV CODE 636 W HCPCS: Performed by: STUDENT IN AN ORGANIZED HEALTH CARE EDUCATION/TRAINING PROGRAM

## 2019-05-24 PROCEDURE — 76937 US GUIDE VASCULAR ACCESS: CPT

## 2019-05-24 PROCEDURE — 84100 ASSAY OF PHOSPHORUS: CPT

## 2019-05-24 PROCEDURE — D9220A PRA ANESTHESIA: ICD-10-PCS | Mod: ANES,,, | Performed by: ANESTHESIOLOGY

## 2019-05-24 PROCEDURE — 99232 PR SUBSEQUENT HOSPITAL CARE,LEVL II: ICD-10-PCS | Mod: ,,, | Performed by: NURSE PRACTITIONER

## 2019-05-24 PROCEDURE — 97110 THERAPEUTIC EXERCISES: CPT

## 2019-05-24 PROCEDURE — 94003 VENT MGMT INPAT SUBQ DAY: CPT

## 2019-05-24 PROCEDURE — 25000003 PHARM REV CODE 250: Performed by: UROLOGY

## 2019-05-24 PROCEDURE — 83735 ASSAY OF MAGNESIUM: CPT

## 2019-05-24 PROCEDURE — 20000000 HC ICU ROOM

## 2019-05-24 PROCEDURE — 80053 COMPREHEN METABOLIC PANEL: CPT | Mod: 91

## 2019-05-24 PROCEDURE — 83036 HEMOGLOBIN GLYCOSYLATED A1C: CPT

## 2019-05-24 PROCEDURE — 45378 PR COLONOSCOPY,DIAGNOSTIC: ICD-10-PCS | Mod: GC,,, | Performed by: INTERNAL MEDICINE

## 2019-05-24 PROCEDURE — 25000003 PHARM REV CODE 250: Performed by: STUDENT IN AN ORGANIZED HEALTH CARE EDUCATION/TRAINING PROGRAM

## 2019-05-24 PROCEDURE — 99232 SBSQ HOSP IP/OBS MODERATE 35: CPT | Mod: ,,, | Performed by: NURSE PRACTITIONER

## 2019-05-24 PROCEDURE — 99900035 HC TECH TIME PER 15 MIN (STAT)

## 2019-05-24 PROCEDURE — 85610 PROTHROMBIN TIME: CPT

## 2019-05-24 PROCEDURE — C1751 CATH, INF, PER/CENT/MIDLINE: HCPCS

## 2019-05-24 PROCEDURE — 99233 PR SUBSEQUENT HOSPITAL CARE,LEVL III: ICD-10-PCS | Mod: ,,, | Performed by: CLINICAL NURSE SPECIALIST

## 2019-05-24 PROCEDURE — 37000008 HC ANESTHESIA 1ST 15 MINUTES: Performed by: INTERNAL MEDICINE

## 2019-05-24 PROCEDURE — 80053 COMPREHEN METABOLIC PANEL: CPT

## 2019-05-24 PROCEDURE — 37000009 HC ANESTHESIA EA ADD 15 MINS: Performed by: INTERNAL MEDICINE

## 2019-05-24 PROCEDURE — D9220A PRA ANESTHESIA: Mod: CRNA,,, | Performed by: NURSE ANESTHETIST, CERTIFIED REGISTERED

## 2019-05-24 PROCEDURE — 99900026 HC AIRWAY MAINTENANCE (STAT)

## 2019-05-24 PROCEDURE — 63600175 PHARM REV CODE 636 W HCPCS: Performed by: NURSE ANESTHETIST, CERTIFIED REGISTERED

## 2019-05-24 PROCEDURE — D9220A PRA ANESTHESIA: ICD-10-PCS | Mod: CRNA,,, | Performed by: NURSE ANESTHETIST, CERTIFIED REGISTERED

## 2019-05-24 PROCEDURE — 45378 DIAGNOSTIC COLONOSCOPY: CPT | Performed by: INTERNAL MEDICINE

## 2019-05-24 PROCEDURE — 27000221 HC OXYGEN, UP TO 24 HOURS

## 2019-05-24 PROCEDURE — 36569 INSJ PICC 5 YR+ W/O IMAGING: CPT

## 2019-05-24 PROCEDURE — 86901 BLOOD TYPING SEROLOGIC RH(D): CPT

## 2019-05-24 PROCEDURE — 99233 PR SUBSEQUENT HOSPITAL CARE,LEVL III: ICD-10-PCS | Mod: 24,,, | Performed by: SURGERY

## 2019-05-24 PROCEDURE — 85025 COMPLETE CBC W/AUTO DIFF WBC: CPT | Mod: 91

## 2019-05-24 PROCEDURE — 99233 SBSQ HOSP IP/OBS HIGH 50: CPT | Mod: 24,,, | Performed by: SURGERY

## 2019-05-24 PROCEDURE — 99233 SBSQ HOSP IP/OBS HIGH 50: CPT | Mod: ,,, | Performed by: CLINICAL NURSE SPECIALIST

## 2019-05-24 PROCEDURE — D9220A PRA ANESTHESIA: Mod: ANES,,, | Performed by: ANESTHESIOLOGY

## 2019-05-24 PROCEDURE — 45378 DIAGNOSTIC COLONOSCOPY: CPT | Mod: GC,,, | Performed by: INTERNAL MEDICINE

## 2019-05-24 PROCEDURE — 94761 N-INVAS EAR/PLS OXIMETRY MLT: CPT

## 2019-05-24 RX ORDER — GLUCAGON 1 MG
1 KIT INJECTION
Status: DISCONTINUED | OUTPATIENT
Start: 2019-05-24 | End: 2019-05-31

## 2019-05-24 RX ORDER — FUROSEMIDE 10 MG/ML
40 INJECTION INTRAMUSCULAR; INTRAVENOUS ONCE
Status: COMPLETED | OUTPATIENT
Start: 2019-05-24 | End: 2019-05-24

## 2019-05-24 RX ORDER — PROPOFOL 10 MG/ML
VIAL (ML) INTRAVENOUS
Status: DISCONTINUED | OUTPATIENT
Start: 2019-05-24 | End: 2019-05-24

## 2019-05-24 RX ORDER — INSULIN ASPART 100 [IU]/ML
0-5 INJECTION, SOLUTION INTRAVENOUS; SUBCUTANEOUS EVERY 6 HOURS PRN
Status: DISCONTINUED | OUTPATIENT
Start: 2019-05-24 | End: 2019-05-31

## 2019-05-24 RX ORDER — LIDOCAINE HCL/PF 100 MG/5ML
SYRINGE (ML) INTRAVENOUS
Status: DISCONTINUED | OUTPATIENT
Start: 2019-05-24 | End: 2019-05-24

## 2019-05-24 RX ADMIN — PROPOFOL 100 MG: 10 INJECTION, EMULSION INTRAVENOUS at 07:05

## 2019-05-24 RX ADMIN — HYDROMORPHONE HYDROCHLORIDE 1 MG: 1 INJECTION, SOLUTION INTRAMUSCULAR; INTRAVENOUS; SUBCUTANEOUS at 08:05

## 2019-05-24 RX ADMIN — CHLORHEXIDINE GLUCONATE 0.12% ORAL RINSE 15 ML: 1.2 LIQUID ORAL at 09:05

## 2019-05-24 RX ADMIN — CEFEPIME 2 G: 2 INJECTION, POWDER, FOR SOLUTION INTRAVENOUS at 03:05

## 2019-05-24 RX ADMIN — MICONAZOLE NITRATE: 20 OINTMENT TOPICAL at 08:05

## 2019-05-24 RX ADMIN — SODIUM CHLORIDE 500 ML: 0.9 INJECTION, SOLUTION INTRAVENOUS at 07:05

## 2019-05-24 RX ADMIN — FUROSEMIDE 40 MG: 10 INJECTION, SOLUTION INTRAMUSCULAR; INTRAVENOUS at 06:05

## 2019-05-24 RX ADMIN — PROPOFOL 50 MG: 10 INJECTION, EMULSION INTRAVENOUS at 07:05

## 2019-05-24 RX ADMIN — CEFEPIME 2 G: 2 INJECTION, POWDER, FOR SOLUTION INTRAVENOUS at 11:05

## 2019-05-24 RX ADMIN — LIDOCAINE HYDROCHLORIDE 100 MG: 20 INJECTION, SOLUTION INTRAVENOUS at 07:05

## 2019-05-24 RX ADMIN — RIFAMPIN 300 MG: 600 INJECTION, POWDER, LYOPHILIZED, FOR SOLUTION INTRAVENOUS at 04:05

## 2019-05-24 RX ADMIN — OXYCODONE HYDROCHLORIDE 5 MG: 5 TABLET ORAL at 08:05

## 2019-05-24 RX ADMIN — RIFAMPIN 300 MG: 600 INJECTION, POWDER, LYOPHILIZED, FOR SOLUTION INTRAVENOUS at 03:05

## 2019-05-24 RX ADMIN — CEFEPIME 2 G: 2 INJECTION, POWDER, FOR SOLUTION INTRAVENOUS at 08:05

## 2019-05-24 RX ADMIN — SODIUM CHLORIDE: 0.9 INJECTION, SOLUTION INTRAVENOUS at 06:05

## 2019-05-24 RX ADMIN — Medication 2 MG: at 09:05

## 2019-05-24 RX ADMIN — CHLORHEXIDINE GLUCONATE 0.12% ORAL RINSE 15 ML: 1.2 LIQUID ORAL at 08:05

## 2019-05-24 RX ADMIN — MAGNESIUM SULFATE IN WATER 2 G: 40 INJECTION, SOLUTION INTRAVENOUS at 05:05

## 2019-05-24 RX ADMIN — POLYETHYLENE GLYCOL 3350, SODIUM SULFATE ANHYDROUS, SODIUM BICARBONATE, SODIUM CHLORIDE, POTASSIUM CHLORIDE 2000 ML: 236; 22.74; 6.74; 5.86; 2.97 POWDER, FOR SOLUTION ORAL at 03:05

## 2019-05-24 RX ADMIN — MICONAZOLE NITRATE: 20 OINTMENT TOPICAL at 09:05

## 2019-05-24 NOTE — PROGRESS NOTES
"[facility]  Endocrinology  Progress Note    Admit Date: 4/20/2019     Reason for Consult: Management of T2DM, Hyperglycemia     Surgical Procedure and Date:       04/21/2019:         1. Exploratory laparotomy        2. Ligation of left ureter        3. Removal of left JJ ureteral stent      Diabetes diagnosis year: ANDRE    Home Diabetes Medications:  ANDRE  Toujeo 50 BID  Invokana 300mg daily   Novolog 35 units AM, 45 units PM, 35 units PM    How often checking glucose at home? ANDRE   BG readings on regimen: ANDRE  Hypoglycemia on the regimen?  ANDRE  Missed doses on regimen?  ANDRE    Diabetes Complications include:     Hyperglycemia and Diabetic retinopathy     Complicating diabetes co morbidities:   History of MI and MURTAZA, CAD, HLD    HPI:   Patient is a 59 y.o. female with a diagnosis of HTN, HLN, DM type 2, nonproliferative diabetic renopathy, CAD, NSTEMI, and back pain who presents to the ED with a complaint of altered mental status on 04/20/2019. Is now s/p Exploratory laparotomy, Ligation of left ureter, and Removal of left JJ ureteral stent. Endocrinology consulted to manage DM2/Hyperglycemia.    Lab Results   Component Value Date    HGBA1C 10.8 (H) 02/19/2019       Interval HPI:   Overnight events: Remains in SICU. NAEON. BG below goal ranges.   Eating:   NPO  Nausea: No  Hypoglycemia and intervention: No  Fever: No  TPN and/or TF: Yes  If yes, type of TF/TPN and rate: paused    BP (!) 177/78 (BP Location: Left arm, Patient Position: Lying)   Pulse 76   Temp 98.4 °F (36.9 °C) (Oral)   Resp (!) 28   Ht 5' 5" (1.651 m)   Wt 77 kg (169 lb 12.1 oz)   SpO2 100%   Breastfeeding? No   BMI 28.25 kg/m²      Labs Reviewed and Include    Recent Labs   Lab 05/24/19  1145   GLU 91   CALCIUM 9.0   ALBUMIN 1.5*   PROT 6.2      K 3.8   CO2 22*   *   BUN 28*   CREATININE 0.8   ALKPHOS 120   ALT 9*   AST 20   BILITOT 0.6     Lab Results   Component Value Date    WBC 13.39 (H) 05/24/2019    HGB " 8.0 (L) 05/24/2019    HCT 24.9 (L) 05/24/2019    MCV 90 05/24/2019     05/24/2019     No results for input(s): TSH, FREET4 in the last 168 hours.  Lab Results   Component Value Date    HGBA1C 5.4 05/24/2019       Nutritional status:   Body mass index is 28.25 kg/m².  Lab Results   Component Value Date    ALBUMIN 1.5 (L) 05/24/2019    ALBUMIN 1.4 (L) 05/24/2019    ALBUMIN 1.4 (L) 05/23/2019     No results found for: PREALBUMIN    Estimated Creatinine Clearance: 78.7 mL/min (based on SCr of 0.8 mg/dL).    Accu-Checks  Recent Labs     05/23/19  0433 05/23/19  0841 05/23/19  1204 05/23/19  1225 05/23/19  1600 05/23/19  2012 05/24/19  0012 05/24/19  0408 05/24/19  0806 05/24/19  1145   POCTGLUCOSE 127* 159* 137* 128* 102 106 109 111* 107 93       Current Medications and/or Treatments Impacting Glycemic Control  Immunotherapy:    Immunosuppressants     None        Steroids:   Hormones (From admission, onward)    None        Pressors:    Autonomic Drugs (From admission, onward)    None        Hyperglycemia/Diabetes Medications:   Antihyperglycemics (From admission, onward)    Start     Stop Route Frequency Ordered    05/24/19 1835  insulin aspart U-100 pen 0-5 Units      -- SubQ Every 6 hours PRN 05/24/19 1735          ASSESSMENT and PLAN    * Ureteral transection of left native kidney  Managed per primary team  Avoid hypoglycemia        Type 2 diabetes mellitus with diabetic peripheral angiopathy without gangrene  BG goal 140 - 180    Discontinue Novolog 4 units q 4 hrs.   Low Dose SQ Insulin Correction Scale PRN for BG excursions.  BG monitoring q 4 hrs.     ** Please notify Endocrine for any change and/or advance in diet/TF**  ** Please call Endocrine for any BG related issues **    Discharge Recommendations:     TBD.             CAD (coronary artery disease)  Managed per primary team  Condition may cause insulin resistance       Sleep apnea  May affect BG readings if uncontrolled        PAPA (acute kidney  injury)  Caution with insulin stacking        HLD (hyperlipidemia)  Managed per primary team  Condition may cause insulin resistance         On enteral nutrition  May cause BG excursions.           Jolanta Morales NP  Endocrinology  [facility]

## 2019-05-24 NOTE — ASSESSMENT & PLAN NOTE
ASSESSMENT/PLAN:   Mariann Huff is a 59 y.o. female s/p left ureteral injury on 4/12, who presented with fever, AMS, and intraabdominal abscess, taken for washout and ligation of left ureter with nephrostomy tube placement on 4/21. S/p Trach/PEG on 5/2. Episode of negative pressure pulmonary edema on 5/8 requiring mechanical ventilation, diuresis and low dose pressor. Has been plagued by intermittent BRBPR.     Neuro:   - off sedation  - responds to questions appropriately by nodding  - no focal deficit     Pulmonary:   - back on rate  - CXR shows pulmonary edema  - ABGs prn  - diuresis as tolerates     Cardiac:   - HDS  - TTE 5/8 with no evidence of right heart strain or significant valvular pathology, normal LV systolic function, EF 70%     Renal:    - S/p transection of left ureter 4/12 and subsequent ligation and PCT nephrostomy tube placement with IR 4/21  - Fontenot removed 5/15, but then replaced for retention   - UO adequate  - BUN/Cr stable  - Monitor I/Os   - did not need nephrostomy tube exhange; leak resolved with repositioning of stopcock       Intake/Output Summary (Last 24 hours) at 5/24/2019 1653  Last data filed at 5/24/2019 1400  Gross per 24 hour   Intake 4105 ml   Output 1611 ml   Net 2494 ml        ID:   - AF  - WBC stable, mildly elevated  - Blood 5/10 NGTD  - Ucx 5/13 candiduria  - BAL 5/18 pseudomonas  - C diff 5/5 negative  - cefepime and rifampin (spine hardware)  - fluconazole  - vanc stopped     Hem/Onc:   - stable, no transfusion today  - check Hgb prn for bleeding     Endocrine:  - DM Type 2  - TF @ goal  - LDSSI  - Endocrine following for assistance with blood glucose control.      Fluids/Electrolytes/Nutrition/GI:   - Free water flushes 300 cc q6h  - Replace electrolytes PRN     # rectal ulcers  - intermittent hematochezia; ulcers seen to be improving on partial colonoscopy 5/21  - pending colonoscopy by GI 5/24  - no bleeding in last 24 hours  - anoscopy and hemostatic agent applied  by CRS 5/15  - imodium QID     PPx:  - DVT: Lovenox, Hep gtt held for continued bloody BMs        DISPO:  - Continue SICU care  - Preparing for LTACH once bloody BMs are managed

## 2019-05-24 NOTE — SUBJECTIVE & OBJECTIVE
Subjective:     Interval History: mild bleeding last night around 8pm, otherwise tolerating prep well, plan for cscope with GI today    Post-Op Info:  Procedure(s) (LRB):  SIGMOIDOSCOPY, FLEXIBLE (N/A)   3 Days Post-Op      Medications:  Continuous Infusions:  Scheduled Meds:   [START ON 5/26/2019] ceFAZolin (ANCEF) IVPB  2 g Intravenous Q8H    ceFEPime (MAXIPIME) IVPB  2 g Intravenous Q8H    chlorhexidine  15 mL Mouth/Throat BID    fluconazole  800 mg Per G Tube Daily    insulin aspart U-100  3 Units Subcutaneous Q24H    insulin aspart U-100  3 Units Subcutaneous Q24H    insulin aspart U-100  3 Units Subcutaneous Q24H    insulin aspart U-100  3 Units Subcutaneous Q24H    insulin aspart U-100  3 Units Subcutaneous Q24H    insulin aspart U-100  3 Units Subcutaneous Q24H    loperamide  2 mg Per G Tube QID    miconazole nitrate 2%   Topical (Top) BID    pantoprazole  40 mg Per G Tube Daily    rifAMpin (RIFADIN) IVPB  300 mg Intravenous Q12H    scopolamine  1 patch Transdermal Q3 Days    silodosin  4 mg Per G Tube Daily     PRN Meds:   sodium chloride    acetaminophen    albuterol-ipratropium    dextrose 50%    glucagon (human recombinant)    hydrALAZINE    HYDROmorphone    hydrOXYzine    insulin aspart U-100    labetalol    magnesium sulfate IVPB    magnesium sulfate IVPB    ondansetron    oxyCODONE    promethazine (PHENERGAN) IVPB    sodium chloride 0.9%        Objective:     Vital Signs (Most Recent):  Temp: 99.2 °F (37.3 °C) (05/24/19 0300)  Pulse: 80 (05/24/19 0600)  Resp: (!) 28 (05/21/19 1500)  BP: (!) 168/74 (05/24/19 0600)  SpO2: 100 % (05/24/19 0600) Vital Signs (24h Range):  Temp:  [98.5 °F (36.9 °C)-100.1 °F (37.8 °C)] 99.2 °F (37.3 °C)  Pulse:  [] 80  SpO2:  [83 %-100 %] 100 %  BP: (111-184)/(55-87) 168/74     Intake/Output - Last 3 Shifts       05/22 0700 - 05/23 0659 05/23 0700 - 05/24 0659    I.V. (mL/kg) 328 (4.3) 465 (6)    Blood  350    NG/GT 1910 4090    Total  Intake(mL/kg) 2238 (29.4) 4905 (63.7)    Urine (mL/kg/hr) 2790 (1.5) 1885 (1)    Stool 0 18    Total Output 2790 1903    Net -552 +3002          Stool Occurrence 3 x 2 x          Physical Exam   Constitutional: She is oriented to person, place, and time. No distress.   Eyes: EOM are normal.   Neck: Neck supple.   Cardiovascular: Normal rate and regular rhythm.   Pulmonary/Chest: Effort normal. No respiratory distress.   Abdominal: Soft. She exhibits no distension. There is no tenderness.   Musculoskeletal: Normal range of motion.   Neurological: She is alert and oriented to person, place, and time.   Skin: Skin is warm and dry.       Significant Labs:  BMP (Last 3 Results):   Recent Labs   Lab 05/22/19  0450 05/23/19  0432 05/24/19  0349   * 115* 100    138 141   K 4.1 4.4 3.9   * 109 111*   CO2 22* 23 19*   BUN 32* 38* 32*   CREATININE 0.7 1.2 0.9   CALCIUM 8.6* 8.7 8.8   MG 1.8 1.6 1.8     CBC (Last 3 Results):   Recent Labs   Lab 05/23/19  0432 05/23/19  1227 05/24/19  0349   WBC 16.64* 14.12* 14.11*   RBC 2.38* 2.88* 2.80*   HGB 6.8* 8.4* 8.1*   HCT 21.7* 26.0* 24.9*    217 223   MCV 91 90 89   MCH 28.6 29.2 28.9   MCHC 31.3* 32.3 32.5       Significant Diagnostics:  I have reviewed all pertinent imaging results/findings within the past 24 hours.

## 2019-05-24 NOTE — PROCEDURES
"Mariann Huff is a 59 y.o. female patient.    Temp: 99 °F (37.2 °C) (05/24/19 0700)  Pulse: 80 (05/24/19 0845)  Resp: (!) 28 (05/21/19 1500)  BP: (!) 175/74 (05/24/19 0830)  SpO2: 100 % (05/24/19 0845)  Weight: 77 kg (169 lb 12.1 oz) (05/24/19 0500)  Height: 5' 5" (165.1 cm) (05/15/19 0445)    PICC  Date/Time: 5/24/2019 8:57 AM  Performed by: Alejandra Oneill RN  Consent Done: Yes  Time out: Immediately prior to procedure a time out was called to verify the correct patient, procedure, equipment, support staff and site/side marked as required  Indications: med administration and vascular access  Anesthesia: local infiltration  Local anesthetic: lidocaine 1% without epinephrine  Anesthetic Total (mL): 2  Preparation: skin prepped with ChloraPrep  Skin prep agent dried: skin prep agent completely dried prior to procedure  Sterile barriers: all five maximum sterile barriers used - cap, mask, sterile gown, sterile gloves, and large sterile sheet  Hand hygiene: hand hygiene performed prior to central venous catheter insertion  Location details: right brachial  Catheter type: double lumen  Catheter size: 5 Fr  Catheter Length: 37cm    Ultrasound guidance: yes  Vessel Caliber: medium and patent, compressibility normal  Vascular Doppler: not done  Needle advanced into vessel with real time Ultrasound guidance.  Guidewire confirmed in vessel.  Image recorded and saved.  Sterile sheath used.  no esophageal manometryNumber of attempts: 1  Post-procedure: chlorhexidine patch, blood return through all ports and sterile dressing applied  Specimens: No  Implants: No  Assessment: placement verified by x-ray  Complications: none          Dang Bright  5/24/2019  "

## 2019-05-24 NOTE — PROGRESS NOTES
[facility]  Critical Care - Surgery  Progress Note    Patient Name: Mariann Huff  MRN: [id number]  Admission Date: 4/20/2019  Hospital Length of Stay: 34 days  Code Status: Full Code  Attending Provider: Robin Boyd MD  Primary Care Provider: Jasbir Haney MD   Principal Problem: Ureteral transection of left native kidney    Subjective:     Hospital/ICU Course:  The patient has had 2 episodes of bradycardia during the day.  Early in the morning, the patient had difficulty breathing and a mucus plug was found in the inner cannula of the tracheostomy.  She developed bradycardia but never arrested.  She also developed hypotension.  This was followed subsequently after she was bagged (Ambu) with tachypnea and hypertension and tachycardia.  Oxygen saturations were in the low 90s, and the arterial blood gas at that time revealed a significant alveolar arterial gradient with adequate ventilation.  Chest x-ray, which I personally reviewed, revealed what appeared to be a significant fluid overload.  We did perform an echocardiogram urgently, which I personally was present and reviewed the results.  There is no evidence of heart failure.  EKG was reported as normal and there is no evidence that the patient had an acute myocardial event.  The chest x-ray did suggest a significant amount of pulmonary edema. There was also a concern, because the the history of upper body deep venous thrombosis, that this could be a pulmonary embolism.  A CT scan of the chest , which I have personally reviewed show bilateral infiltrates compatible with pulmonary edema of non cardiogenic origin.  It was not possible to rule out a pulmonary embolism.  The patient is anticoagulated currently.  Ultrasound of the lower extremities was done and they do not show any deep venous thrombosis.    The patient did receive some fluids, because of her history of acute kidney injury, which has resolved and because she was receiving IV  contrast.  She has maintained a good urinary output during the day.  She has mostly being hemodynamically stable and her tachycardia resolved.  She remains sedated and on the ventilator.  Follow-up chest x-ray, which I personally reviewed, showed resolving pulmonary edema. Her oxygenation and oxygen saturations improved and we were able to wean the patient FiO2 down to 50%.    Of note, I did perform a bronchoscopy, which failed to reveal any amount of pus or mucus plugging.  There was also no evidence of aspiration.  BAL was performed and was sent.    A 2nd episode of bradycardia was observed in the afternoon.  This responded to the use of atropine and 0.1 mg of epinephrine.  She did have a tachycardic response with this which subsided rapidly.  She remained otherwise hemodynamically stable.  Arterial blood gases, reveal now a PO2 of 300 with adequate pCO2 and pH.  Mild hyperventilation.  She remains comfortable.  I have asked Cardiology to re-evaluate this patient.  For now she remains off pressors.    Neurologically the patient has been intact. She is awake alert and oriented with a nonfocal exam.    Her abdomen remains soft.  She has been NPO for now.  She has not had any further episodes of lower GI bleed.    The patient is having a good urinary output BUN and creatinine have been grossly within normal limits with a slight elevation of BUN.  Electrolytes are being followed and replaced as necessary.    Currently no evidence of infection in this patient.  Empirically I started antibiotics, but will stop them in the next 24-48 hours.    I have had the chance to talk to the patient's  at length and in detail.  I have explained our findings, I will keep him up-to-date as to any progress in understanding the etiology of these episodes and will wait continuing to talk to him as often as necessary.      Interval History/Significant Events: Last bloody BM yesterday noon. CTA negative. HDS. On vent again, volume  positive. Completed bowel prep. Awaiting colonoscopy. Did not need nephrostomy replacement; turned out stopcock was incorrectly positioned.    Follow-up For: Procedure(s) (LRB):  SIGMOIDOSCOPY, FLEXIBLE (N/A)    Post-Operative Day: 3 Days Post-Op    Objective:     Vital Signs (Most Recent):  Temp: 98.4 °F (36.9 °C) (05/24/19 1100)  Pulse: 73 (05/24/19 1145)  Resp: (!) 28 (05/21/19 1500)  BP: (!) 176/82 (05/24/19 1130)  SpO2: 100 % (05/24/19 1145) Vital Signs (24h Range):  Temp:  [98.4 °F (36.9 °C)-100.1 °F (37.8 °C)] 98.4 °F (36.9 °C)  Pulse:  [] 73  SpO2:  [83 %-100 %] 100 %  BP: (122-184)/(60-87) 176/82     Weight: 77 kg (169 lb 12.1 oz)  Body mass index is 28.25 kg/m².      Intake/Output Summary (Last 24 hours) at 5/24/2019 1147  Last data filed at 5/24/2019 1000  Gross per 24 hour   Intake 5255 ml   Output 1964 ml   Net 3291 ml       Physical Exam   Constitutional: She appears well-developed and well-nourished.   HENT:   Head: Normocephalic and atraumatic.   Tracheostomy in place   Eyes: Right eye exhibits no discharge. Left eye exhibits no discharge.   Neck: Normal range of motion. Neck supple.   Cardiovascular: Normal rate and regular rhythm.   Pulmonary/Chest: Effort normal. No respiratory distress.   Abdominal: Soft.   Midline incision c/d/i.  PEG with no leak. Abdomen soft, non-distended.  Bowel sounds present   Genitourinary:   Genitourinary Comments: Nephrostomy tube in place with yellow output.  Fontenot in place  No rectal bleeding since noon 5/23   Musculoskeletal: Normal range of motion.   Neurological: She is alert.   Able to follow commands, denying pain.    Skin: Skin is warm and dry.   Psychiatric: She has a normal mood and affect.   Nursing note and vitals reviewed.      Vents:  Vent Mode: A/C (05/24/19 1125)  Ventilator Initiated: Yes (05/08/19 0630)  Set Rate: 20 bmp (05/24/19 1125)  Vt Set: 420 mL (05/24/19 1125)  Pressure Support: 10 cmH20 (05/23/19 1716)  PEEP/CPAP: 8 cmH20 (05/24/19  1125)  Oxygen Concentration (%): 41 (05/24/19 1145)  Peak Airway Pressure: 26 cmH2O (05/24/19 1125)  Plateau Pressure: 24 cmH20 (05/24/19 1125)  Total Ve: 8.76 mL (05/24/19 1125)  Negative Inspiratory Force (cm H2O): -18 (04/27/19 1306)  F/VT Ratio<105 (RSBI): 173.91 (05/21/19 1500)    Lines/Drains/Airways     Peripherally Inserted Central Catheter Line                 PICC Double Lumen 05/24/19 0900 right brachial less than 1 day          Drain                 Nephrostomy 04/21/19 0629 Left 8 Fr. 33 days         Gastrostomy/Enterostomy 05/02/19 1134 Percutaneous endoscopic gastrostomy (PEG) LUQ decompression;feeding 22 days         Urethral Catheter 05/16/19 1040 16 Fr. 8 days          Airway                 Surgical Airway 05/02/19 1107 Shiley Cuffed 22 days          Peripheral Intravenous Line                 Peripheral IV - Single Lumen 05/23/19 0651 22 G Posterior;Right Hand 1 day                Significant Labs:    CBC/Anemia Profile:  Recent Labs   Lab 05/23/19  0432 05/23/19  1227 05/24/19  0349   WBC 16.64* 14.12* 14.11*   HGB 6.8* 8.4* 8.1*   HCT 21.7* 26.0* 24.9*    217 223   MCV 91 90 89   RDW 14.7* 14.7* 14.6*        Chemistries:  Recent Labs   Lab 05/23/19  0432 05/24/19  0349    141   K 4.4 3.9    111*   CO2 23 19*   BUN 38* 32*   CREATININE 1.2 0.9   CALCIUM 8.7 8.8   ALBUMIN 1.4* 1.4*   PROT 5.9* 6.0   BILITOT 0.6 0.6   ALKPHOS 118 119   ALT 8* 7*   AST 19 21   MG 1.6 1.8   PHOS 3.4 2.8       All pertinent labs within the past 24 hours have been reviewed.    Significant Imaging:  I have reviewed all pertinent imaging results/findings within the past 24 hours.    Assessment/Plan:     * Ureteral transection of left native kidney  ASSESSMENT/PLAN:   Mariann CROW Huff is a 59 y.o. female s/p left ureteral injury on 4/12, who presented with fever, AMS, and intraabdominal abscess, taken for washout and ligation of left ureter with nephrostomy tube placement on 4/21. S/p Trach/PEG on 5/2.  Episode of negative pressure pulmonary edema on 5/8 requiring mechanical ventilation, diuresis and low dose pressor. Has been plagued by intermittent BRBPR.     Neuro:   - off sedation  - responds to questions appropriately by nodding  - no focal deficit     Pulmonary:   - back on rate  - CXR shows pulmonary edema  - ABGs prn  - diuresis as tolerates     Cardiac:   - HDS  - TTE 5/8 with no evidence of right heart strain or significant valvular pathology, normal LV systolic function, EF 70%     Renal:    - S/p transection of left ureter 4/12 and subsequent ligation and PCT nephrostomy tube placement with IR 4/21  - Fontenot removed 5/15, but then replaced for retention   - UO adequate  - BUN/Cr stable  - Monitor I/Os   - did not need nephrostomy tube exhange; leak resolved with repositioning of stopcock       Intake/Output Summary (Last 24 hours) at 5/24/2019 1653  Last data filed at 5/24/2019 1400  Gross per 24 hour   Intake 4105 ml   Output 1611 ml   Net 2494 ml        ID:   - AF  - WBC stable, mildly elevated  - Blood 5/10 NGTD  - Ucx 5/13 candiduria  - BAL 5/18 pseudomonas  - C diff 5/5 negative  - cefepime and rifampin (spine hardware)  - fluconazole  - vanc stopped     Hem/Onc:   - stable, no transfusion today  - check Hgb prn for bleeding     Endocrine:  - DM Type 2  - TF @ goal  - LDSSI  - Endocrine following for assistance with blood glucose control.      Fluids/Electrolytes/Nutrition/GI:   - Free water flushes 300 cc q6h  - Replace electrolytes PRN     # rectal ulcers  - intermittent hematochezia; ulcers seen to be improving on partial colonoscopy 5/21  - pending colonoscopy by GI 5/24  - no bleeding in last 24 hours  - anoscopy and hemostatic agent applied by CRS 5/15  - imodium QID     PPx:  - DVT: Lovenox, Hep gtt held for continued bloody BMs        DISPO:  - Continue SICU care  - Preparing for LTACH once bloody BMs are managed            Critical secondary to Patient has a condition that poses threat to  life and bodily function: Severe Respiratory Distress and ongoing GI bleed     Critical care was time spent personally by me on the following activities: development of treatment plan with patient or surrogate and bedside caregivers, discussions with consultants, evaluation of patient's response to treatment, examination of patient, ordering and performing treatments and interventions, ordering and review of laboratory studies, ordering and review of radiographic studies, pulse oximetry, re-evaluation of patient's condition.  This critical care time did not overlap with that of any other provider or involve time for any procedures.     ISABELLE Carter MD  Critical Care - Surgery  [facility]

## 2019-05-24 NOTE — PROGRESS NOTES
[facility]  Infectious Disease  Progress Note    Patient Name: Mariann Huff  MRN: [id number]  Admission Date: 4/20/2019  Length of Stay: 33 days  Attending Physician: Robin Boyd MD  Primary Care Provider: Jasbir Haney MD    Isolation Status: No active isolations  Assessment/Plan:      Sepsis     58 y/o female with HTN, DMII, CAD, NSTEMI, hx of L5-S1 OLIF on 4/12 c/b intraoperative left ureteral injury s/p ureteroureteral anastomoses and ureteral stent placement presents with fevers and AMS found to have E. Coli septicemia and air and fluid within the retroperitoneal and within the left collecting system.    She underwent ex-lap and washout on 4/21. Per op note,  There were pockets of purulence evacuated. Posterior to the stent there was a pocket of purulent fluid and exposed hardware along the spinal vertebrae. The tissue along the distal and proximal end of ureter were friable and unhealthy. She underwent left nephrostomy tube placement. The stent was removed and several metal clips were placed on proximal end of the ureteral. OR cultures with Staph Lugdenensis. She has been on Cefazolin and Rifampin with a plan to complete 6 weeks.    ID re-consulted as patient developed fevers and a leukocytosis. Procalc 20. Abx broadened to Vancomycin, Cefepime, Rifampin and Fluconazole. Lines exchanged. 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  CXR with new L basilar consolidation c/f HAP.     Blood cx negative. Respiratory culture (endotracheal aspirate) - Pseudomonas. Urine cx - candida species.     Since broadening coverage her fever curve is improving. T max 100.8. WBC count trending down to 14 from 24. Tachycardic. PE still on differential.     5/23 CTA ABD Done and still with subcutaneous fluid collection larger but less fat stranding.      Plan  - Continue Cefepime x 7 days total for HAP. Would  then transition back to Cefazolin plus Rifampin for prior E. Coli and Staph lugdenesis x 8 weeks total (estimated end date 6/18/19)  - Continue high dose Fluconazole 800mg IV daily x 7 days for C glabrata in urine  - If she continues to remain febrile, recommend aspiration/drainage of ABDf fluid collection  - ID will sign off  - while on ancef/RIF needs on Mondays weekly ESR, CRP, CBC, CMP faxed to ID dept at [phone] - if going to LTAC then consult ID to follow.  - patient can fu in ID clinic 14 d from DC or after DC from LTAC  - will sign off                     Anticipated Disposition: tbd    Thank you for your consult. I will sign off. Please contact us if you have any additional questions.    POLLY Jerome  Infectious Disease  [facility]    Subjective:     Principal Problem:Ureteral transection of left native kidney    HPI: 58 y/o female with HTN, DMII, CAD, NSTEMI, s/p L5-S1 OLIF with NSGY 4/12 sustained intraoperative laceration and underwent ureteroureteral anastomoses and ureteral stent placement. She was discharged with a correa and MADHURI drain that was removed on 4/16. She was seen by urology and anticipated stent removal in 2-3 months (6/2019).  She presents to ED with AMS and sepsis. She was febrile 103 in ED. WBC 5K on admit. Lactate 4.6. Cath UA with 3+leukocytes, >100 WBcs and many bacteria.     MRI: Postsurgical change of L5-S1 posterior spinal fusion and anterior interbody fusion with a 4.4 cm fluid fluid collection in the left anterior prevertebral soft tissues at the level of the L5-S1 disc space.  Findings may represent postoperative seroma or evolving hematoma however, urinoma not excluded.  No definite abscess although correlation is advised.    Irregular flow void involving the right common iliac vein, underlying thrombus not excluded.      CT: air collection within the anterior paraspinous soft tissues through which the left ureter courses. Given that the ureter  courses through this, communication of the ureter with this collection is not excluded.  There is a ureteral stent within the left ureter.Punctate foci of air in L5-S1.  2. Air within the left renal collecting system, along the left ureter, and within the urinary bladder, correlation advised.    This was not amendable for drainage by IR so she was taken to OR for washout.     She underwent ex-lap of left ureter and left nephrostomy tube placement 4/21/19.   Per op note,  There were pocket of purulence noted and evacuated, sent for culture of left retroperitoneum to pole of left kidney. The stent was visible. Posterior to the stent there was a pocket of purulent fluid and exposed hardware along the spinal vertebrae. The tissue along the distal and proximal end of ureter were friable and unhealthy. She underwent left nephrostomy tube placement. The stent was removed and several metal clips were placed on proximal end of the ureteral. MADHURI drain was placed into left retroperitoneal.     Blood cultures were are positive for E coli..   Urine cultures +E.coli  OR cultures were positive for Staph Ludenensis  She is was treated with zosyn. She is in the ICU, intubated, sedated    ID had seen the patient and there was concern for hardware involvement given proximity and the patient was placed on ceftriaxone and rifampin with plan for 6 weeks of abx.  ID was reconsulted due to fever and leukocytosis.  Patient denied any abdominal pain , fever, chills or sweats.  Repeat Bblood cultures and urine cultures are no growth.  Patient was broadened to Vanc, CTX and Rifampin, later vanc was stopped.  Patient improved and leukocytosis and fever resolved.  Patient had PEG and Tracheostomy.  ID signed off with plans to complete Ceftriaxone and rifampin for 6 weeks through 6/4.    Patient recently began again with progressive leukocytosis and fever so ID reconsulted.  Patient has been with low H/H and bleeding per rectum per nurse.  She has  had 2 transfusions on 5/14,16 and 19. Patient denies any new complaints including fever, chills, sweats, N/V/D skin problems, rash, CP, pleuritic CP. CXR with new LLL consolidation and nurse reports increased coarse breath sounds and increased thick sputum.  Patient reports SOB.  Endotracheal aspirate shows GNRs  Blood cultures have been resent.  Of note patient had a subcutaneous fluid collection on last CT ABD.    Interval History: No AEON.   Afebrile and WBC decreased to 16.64 from peak 23.19.  SOB improved and mucus decreasing.  CT ABD done.  Nephrostomy tube leaking and going to IR today for eval.  The patient denies any recent fever, chills, or sweats.      Review of Systems   Constitutional: Negative for activity change, chills, diaphoresis and fever.   Respiratory: Negative for cough, shortness of breath and wheezing.    Cardiovascular: Negative for chest pain.   Gastrointestinal: Negative for abdominal pain, constipation, diarrhea, nausea and vomiting.   Genitourinary: Negative for dysuria, frequency and urgency.   Neurological: Negative for dizziness.   Hematological: Does not bruise/bleed easily.     Objective:     Vital Signs (Most Recent):  Temp: 99.9 °F (37.7 °C) (05/23/19 0924)  Pulse: 82 (05/23/19 1045)  Resp: (!) 28 (05/21/19 1500)  BP: (!) 125/58 (05/23/19 1030)  SpO2: 98 % (05/23/19 1045) Vital Signs (24h Range):  Temp:  [98.3 °F (36.8 °C)-100.8 °F (38.2 °C)] 99.9 °F (37.7 °C)  Pulse:  [] 82  SpO2:  [90 %-100 %] 98 %  BP: ()/(52-94) 125/58     Weight: 76 kg (167 lb 8.8 oz)  Body mass index is 27.88 kg/m².    Estimated Creatinine Clearance: 52.1 mL/min (based on SCr of 1.2 mg/dL).    Physical Exam   Constitutional: She is oriented to person, place, and time. She appears well-developed and well-nourished. No distress.   HENT:   Head: Normocephalic and atraumatic.   Neck:   tracheostomy in place   Cardiovascular: Normal rate, regular rhythm and normal heart sounds. Exam reveals no gallop  and no friction rub.   No murmur heard.  Pulmonary/Chest: Effort normal and breath sounds normal. No stridor. No respiratory distress.   Abdominal: Soft. She exhibits no distension. There is no tenderness.   Midline incision c/d/i   Genitourinary:   Genitourinary Comments: Nephrostomy tube in place with clear yellow urine output   Musculoskeletal: She exhibits no edema.   Neurological: She is alert and oriented to person, place, and time.   Following commands   Skin: Skin is warm and dry. She is not diaphoretic.   Psychiatric: She has a normal mood and affect. Her behavior is normal.       Significant Labs:   Blood Culture:   Recent Labs   Lab 05/10/19  1015 05/10/19  1020 05/19/19  1843 05/20/19  1300 05/20/19  1306   LABBLOO No growth after 5 days. No growth after 5 days. No Growth to date  No Growth to date  No Growth to date  No Growth to date No Growth to date  No Growth to date  No Growth to date No Growth to date  No Growth to date  No Growth to date     CBC:   Recent Labs   Lab 05/22/19  0650 05/22/19  1815 05/23/19  0432   WBC 16.09* 18.30* 16.64*   HGB 7.6* 8.4* 6.8*   HCT 24.5* 27.0* 21.7*    267 246     CMP:   Recent Labs   Lab 05/22/19  0450 05/23/19  0432    138   K 4.1 4.4   * 109   CO2 22* 23   * 115*   BUN 32* 38*   CREATININE 0.7 1.2   CALCIUM 8.6* 8.7   PROT 5.8* 5.9*   ALBUMIN 1.5* 1.4*   BILITOT 0.6 0.6   ALKPHOS 116 118   AST 23 19   ALT 8* 8*   ANIONGAP 8 6*   EGFRNONAA >60.0 49.9*     Wound Culture:   Recent Labs   Lab 04/21/19  0125 05/19/19  1241   LABAERO STAPHYLOCOCCUS LUGDUNENSIS  Rare   No growth     All pertinent labs within the past 24 hours have been reviewed.    Significant Imaging: I have reviewed all pertinent imaging results/findings within the past 24 hours.   X-Ray Chest AP Portable [[id number]] Resulted: 05/23/19 0808   Order Status: Completed Updated: 05/23/19 0810   Narrative:     EXAMINATION:  XR CHEST AP PORTABLE    CLINICAL HISTORY:  f/u  diuresis today;    COMPARISON:  Comparison is made to 05/22/2019 at 13:42    FINDINGS:  There has been some interval clearing of airspace consolidation in the perihilar lung zones since 05/22/2019 at 13:42, although some airspace consolidation in the left perihilar region persists.  No significant detrimental interval change in the appearance of the chest since that time is appreciated.  No pneumothorax.   Impression:       As above      Electronically signed by: Christian Moreno MD  Date: 05/23/2019  Time: 08:08   X-Ray Chest AP Portable [[id number]] Resulted: 05/23/19 0555   Order Status: Completed Updated: 05/23/19 0557   Narrative:     EXAMINATION:  XR CHEST AP PORTABLE    CLINICAL HISTORY:  PNA;    COMPARISON:  Comparison is made to 05/22/2019 at 20:09.    FINDINGS:  Allowing for differences in patient positioning, there has been no significant interval change in the appearance of the chest since 05/22/2019 at 20:09.  No pneumothorax.   Impression:       As above      Electronically signed by: Christian Moreno MD  Date: 05/23/2019  Time: 05:55   CTA Abdomen and Pelvis [[id number]] (Abnormal) Resulted: 05/23/19 0139   Order Status: Completed Updated: 05/23/19 0142   Narrative:     EXAMINATION:  CTA ABDOMEN AND PELVIS    CLINICAL HISTORY:  GI bleeding;    TECHNIQUE:  Using 100 cc of  Omnipaque 350 IV, and multi-detector helical CT technique, axial CT angiogram images of the abdomen were obtained from the lung bases through the pelvis. Precontrast and portal venous phase images of the abdomen and pelvis also done. 2D post-processing coronal and sagittal reconstructions of the abdominal aorta and visceral arteries performed.    COMPARISON:  CT chest/abdomen/pelvis from 05/01/2019.    FINDINGS:  Exam is somewhat limited due to patient's arm positioning.    Heart: Normal in size. No pericardial effusion.    Lung Bases: Bilateral small volume dependent pleural fluid.  Persistent consolidative changes at the bilateral lung  bases.    Liver: Enlarged with normal attenuation.  No focal hepatic lesions.  Portal vein is patent.    Gallbladder: No calcified gallstones.    Bile Ducts: No evidence of dilated ducts.    Pancreas: No mass or peripancreatic fat stranding.    Spleen: Enlarged with multiple new expansile hypoattenuating lesions, largest in the superolateral aspect measuring up to 6.4 cm.  No convincing arterial enhancement or extravasation.    Adrenals: Unremarkable.    Kidneys/ Ureters: Left kidney is normal in size and location with mild hydronephrosis/proximal hydroureter and nephrostomy tube in place.  Postoperative changes noted status post ureteral repair, mid ureter is not well characterized due to artifact from arm positioning and lumbar hardware.  Multiple stable cystic hypoattenuating lesions throughout the left kidney, largest consistent with simple cyst. Right kidney is normal in size and location with stable subcentimeter hypoattenuating lesion which is too small to fully characterize but likely a small cyst.  No hydronephrosis or hydroureter.    Bladder: Decompressed with Fontenot catheter in place.    Reproductive organs: Status post hysterectomy.    GI Tract/Mesentery: No intraluminal pooling of contrast to suggest active GI hemorrhage. No evidence of bowel obstruction or inflammation.    Peritoneal Space: Small amount of free fluid in the pelvis.  No free air.    Retroperitoneum:  No significant adenopathy.    Abdominal wall:  Postoperative changes status post midline incision with persistent subcutaneous hypoattenuating collection inferior to the incision site measuring 5.3 x 3.4 cm.  There is decreased surrounding fat stranding.  Diffuse body wall edema.    Vasculature: Moderate atherosclerosis with prominent plaque at the celiac origin which results in at least 50% narrowing.  No aneurysm.    Bones: No acute fracture.  Posterior instrumented fusion of L5-S1 with disc spacer device in place.  Age-appropriate  degenerative changes.   Impression:       No evidence of active GI hemorrhage.    Multiple new expansile hypoattenuating lesions in the spleen without significant enhancement or gross contrast extravasation.  Findings are nonspecific may represent hematoma, infarct, or less likely abscess.    Interval development of mild left hydronephrosis in patient status post left ureteral repair and for ostomy tube placement.  Of note, difficult to evaluate the mid ureter due to artifact from patient's arm positioning and spinal hardware.    Persistent bilateral pleural effusions and airspace opacities most consistent with pulmonary edema.    Focal subcutaneous fluid collection inferior to the midline incision site, slightly larger with decreased surrounding fat stranding.  Findings may represent seroma, hematoma, or less likely abscess.    Aortic atherosclerosis with prominent plaque at the celiac origin resulting in at least 50% narrowing, similar to prior May 1, 2019.    Additional findings as above.    This report was flagged in Epic as abnormal.    Electronically signed by resident: Abraham Peña  Date: 05/22/2019  Time: 22:59    Electronically signed by: Pancho Sheikh MD  Date: 05/23/2019  Time: 01:39   X-Ray Chest AP Portable [[id number]] Resulted: 05/22/19 1442   Order Status: Completed Updated: 05/22/19 1444   Narrative:     EXAMINATION:  XR CHEST AP PORTABLE    CLINICAL HISTORY:  rule out pulm edema;    TECHNIQUE:  Single frontal view of the chest was performed.    COMPARISON:  05/21/2019, 10:36 hours.    FINDINGS:  Tracheostomy cannula tip overlies the airway.  Ventilator apparatus projects over the mediastinum and left hemithorax.  Extrinsic leads and other extrinsic devices also overlie the chest and upper abdomen.    Mediastinal structures are midline.    There is mixed interstitial and airspace disease in perihilar distribution, worse on the left.  This has progressed since yesterday's study of 10/30 6 hr.   Given the distribution and rapid progression I suspect pulmonary edema although aspiration, pneumonia or pulmonary hemorrhage could present in a similar fashion.    I do not identify pleural fluid, pneumothorax, pneumomediastinum, pneumoperitoneum or significant osseous abnormality.   Impression:       Abnormal appearance of the chest radiograph.      Electronically signed by: Fay Loyola MD  Date: 05/22/2019  Time: 14:42   X-Ray Chest AP Portable [[id number]] Resulted: 05/21/19 1055   Order Status: Completed Updated: 05/21/19 1057   Narrative:     EXAMINATION:  XR CHEST AP PORTABLE    CLINICAL HISTORY:  PNA;    COMPARISON:  Comparison is made to 05/21/2019 at 06:02.    FINDINGS:  Tracheostomy cannula again seen.  Cardiomediastinal silhouette is magnified both by projection and by a poor inspiratory depth level.  I doubt that the true cardiac size is any larger than upper limit of normal allowing for these technical factors, and the cardiomediastinal silhouette has not changed appreciably since the examination referenced above.  Pulmonary vascularity in the upper lung zones is minimally prominent, as before.  Lung zones appear stable, with some patchy airspace consolidation in the left mid/lower lung zones again seen but with no significant new areas of airspace consolidation or volume loss evident.  Some of the opacity in the inferior hemothorax on the left side may reflect pleural fluid.  No definite pleural fluid on the right.  No pneumothorax.   Impression:       Allowing for an even poorer inspiratory depth level on the current exam, there has been no significant interval change in the appearance of the chest since 05/21/2019 at 06:02.      Electronically signed by: Christian Moreno MD  Date: 05/21/2019  Time: 10:55   X-Ray Chest AP Portable [[id number]] Resulted: 05/21/19 0800   Order Status: Completed Updated: 05/21/19 0802   Narrative:     EXAMINATION:  XR CHEST AP PORTABLE    CLINICAL  HISTORY:  PNA;    COMPARISON:  Comparison is made to 05/20/2019.    FINDINGS:  No significant interval change in the appearance of the chest since 05/20/2019 is appreciated, allowing for a poorer inspiratory depth level on the current examination.  No pneumothorax.   Impression:       As above      Electronically signed by: Christian Moreno MD  Date: 05/21/2019  Time: 08:00   X-Ray Chest AP Portable [[id number]] Resulted: 05/20/19 0717   Order Status: Completed Updated: 05/20/19 0719   Narrative:     EXAMINATION:  XR CHEST AP PORTABLE    CLINICAL HISTORY:  eval consolidation;    TECHNIQUE:  Single frontal view of the chest was performed.    COMPARISON:  May 19, 2019.    FINDINGS:  Tracheostomy cannula monitoring leads remain.  Heart size is similar.  Increase in the pulmonary vascular and perihilar alveolar markings though some improvement in the aeration in the peripheral lung fields.   there may be some pleural fluid on the left.  No pneumothorax.   Impression:       Findings most consistent with congestive heart failure.  Aeration is improved.      Electronically signed by: Mk Reilly MD  Date: 05/20/2019  Time: 07:17   X-Ray Chest AP Portable [[id number]] Resulted: 05/19/19 1513   Order Status: Completed Updated: 05/19/19 1516   Narrative:     EXAMINATION:  XR CHEST AP PORTABLE    CLINICAL HISTORY:  respiratory distress;    TECHNIQUE:  Single frontal view of the chest was performed.    COMPARISON:  05/19/2019 at 05:11    FINDINGS:  Tracheostomy tube in the clavicular heads.    Diffuse alveolar filling process unchanged.  No pneumothorax or pleural effusion.  The cardiomediastinal silhouette, osseous and soft tissue structures are stable.   Impression:       No significant interval change in what is likely diffuse alveolar pulmonary edema.  Infection can have a similar appearance.      Electronically signed by: Sharon Bro MD  Date: 05/19/2019  Time: 15:13   X-Ray Chest AP Portable [[id number]] Resulted: 05/19/19  0558   Order Status: Completed Updated: 05/19/19 0600   Narrative:     EXAMINATION:  XR CHEST AP PORTABLE    CLINICAL HISTORY:  trach;    TECHNIQUE:  Single frontal view of the chest was performed.    COMPARISON:  05/18/2019    FINDINGS:  Tracheostomy cannula projects in stable radiographic position.  Cardiomediastinal silhouette is unchanged.  The lungs demonstrate persistent diffuse increased interstitial attenuation and perihilar opacities, similar to prior examination.  No evidence of pneumothorax.   Impression:       No significant detrimental interval change compared to 05/18/2019.      Electronically signed by: Al Wu MD  Date: 05/19/2019  Time: 05:58   X-Ray Chest AP Portable [[id number]] Resulted: 05/18/19 0751   Order Status: Completed Updated: 05/18/19 0754   Narrative:     EXAMINATION:  XR CHEST AP PORTABLE    CLINICAL HISTORY:  trach;    TECHNIQUE:  Single frontal view of the chest was performed.    COMPARISON:  Chest AP portable dated 05/17/2019    FINDINGS:  Tracheostomy tube again noted.  Coarsened, central predominant interstitial attenuation with improved left perihilar infiltrate and new left basilar opacity.  Heart size is similar.  No pneumothorax or free air.   Impression:       As above      Electronically signed by: Tristan Flores  Date: 05/18/2019  Time: 07:51   X-Ray Chest AP Portable [[id number]] Resulted: 05/17/19 0903   Order Status: Completed Updated: 05/17/19 0905   Narrative:     EXAMINATION:  XR CHEST AP PORTABLE    CLINICAL HISTORY:  trach;    TECHNIQUE:  Single frontal view of the chest was performed.    COMPARISON:  No 05/16/2019 ne      Electronically signed by: Christian Valencia MD  Date: 05/17/2019  Time: 09:03

## 2019-05-24 NOTE — SUBJECTIVE & OBJECTIVE
Interval History/Significant Events: Last bloody BM yesterday noon. CTA negative. HDS. On vent again, volume positive. Completed bowel prep. Awaiting colonoscopy. Did not need nephrostomy replacement; turned out stopcock was incorrectly positioned.    Follow-up For: Procedure(s) (LRB):  SIGMOIDOSCOPY, FLEXIBLE (N/A)    Post-Operative Day: 3 Days Post-Op    Objective:     Vital Signs (Most Recent):  Temp: 98.4 °F (36.9 °C) (05/24/19 1100)  Pulse: 73 (05/24/19 1145)  Resp: (!) 28 (05/21/19 1500)  BP: (!) 176/82 (05/24/19 1130)  SpO2: 100 % (05/24/19 1145) Vital Signs (24h Range):  Temp:  [98.4 °F (36.9 °C)-100.1 °F (37.8 °C)] 98.4 °F (36.9 °C)  Pulse:  [] 73  SpO2:  [83 %-100 %] 100 %  BP: (122-184)/(60-87) 176/82     Weight: 77 kg (169 lb 12.1 oz)  Body mass index is 28.25 kg/m².      Intake/Output Summary (Last 24 hours) at 5/24/2019 1147  Last data filed at 5/24/2019 1000  Gross per 24 hour   Intake 5255 ml   Output 1964 ml   Net 3291 ml       Physical Exam   Constitutional: She appears well-developed and well-nourished.   HENT:   Head: Normocephalic and atraumatic.   Tracheostomy in place   Eyes: Right eye exhibits no discharge. Left eye exhibits no discharge.   Neck: Normal range of motion. Neck supple.   Cardiovascular: Normal rate and regular rhythm.   Pulmonary/Chest: Effort normal. No respiratory distress.   Abdominal: Soft.   Midline incision c/d/i.  PEG with no leak. Abdomen soft, non-distended.  Bowel sounds present   Genitourinary:   Genitourinary Comments: Nephrostomy tube in place with yellow output.  Fontenot in place  No rectal bleeding since noon 5/23   Musculoskeletal: Normal range of motion.   Neurological: She is alert.   Able to follow commands, denying pain.    Skin: Skin is warm and dry.   Psychiatric: She has a normal mood and affect.   Nursing note and vitals reviewed.      Vents:  Vent Mode: A/C (05/24/19 1125)  Ventilator Initiated: Yes (05/08/19 0630)  Set Rate: 20 bmp (05/24/19  1125)  Vt Set: 420 mL (05/24/19 1125)  Pressure Support: 10 cmH20 (05/23/19 1716)  PEEP/CPAP: 8 cmH20 (05/24/19 1125)  Oxygen Concentration (%): 41 (05/24/19 1145)  Peak Airway Pressure: 26 cmH2O (05/24/19 1125)  Plateau Pressure: 24 cmH20 (05/24/19 1125)  Total Ve: 8.76 mL (05/24/19 1125)  Negative Inspiratory Force (cm H2O): -18 (04/27/19 1306)  F/VT Ratio<105 (RSBI): 173.91 (05/21/19 1500)    Lines/Drains/Airways     Peripherally Inserted Central Catheter Line                 PICC Double Lumen 05/24/19 0900 right brachial less than 1 day          Drain                 Nephrostomy 04/21/19 0629 Left 8 Fr. 33 days         Gastrostomy/Enterostomy 05/02/19 1134 Percutaneous endoscopic gastrostomy (PEG) LUQ decompression;feeding 22 days         Urethral Catheter 05/16/19 1040 16 Fr. 8 days          Airway                 Surgical Airway 05/02/19 1107 Shiley Cuffed 22 days          Peripheral Intravenous Line                 Peripheral IV - Single Lumen 05/23/19 0651 22 G Posterior;Right Hand 1 day                Significant Labs:    CBC/Anemia Profile:  Recent Labs   Lab 05/23/19  0432 05/23/19  1227 05/24/19  0349   WBC 16.64* 14.12* 14.11*   HGB 6.8* 8.4* 8.1*   HCT 21.7* 26.0* 24.9*    217 223   MCV 91 90 89   RDW 14.7* 14.7* 14.6*        Chemistries:  Recent Labs   Lab 05/23/19  0432 05/24/19  0349    141   K 4.4 3.9    111*   CO2 23 19*   BUN 38* 32*   CREATININE 1.2 0.9   CALCIUM 8.7 8.8   ALBUMIN 1.4* 1.4*   PROT 5.9* 6.0   BILITOT 0.6 0.6   ALKPHOS 118 119   ALT 8* 7*   AST 19 21   MG 1.6 1.8   PHOS 3.4 2.8       All pertinent labs within the past 24 hours have been reviewed.    Significant Imaging:  I have reviewed all pertinent imaging results/findings within the past 24 hours.

## 2019-05-24 NOTE — INTERVAL H&P NOTE
Pre-Procedure H and P Addendum    Patient seen and examined.  History and exam unchanged from prior history and physical.      Procedure: Colonoscopy  Indication: Hematochezia, rectal ulcer  ASA Class: per anesthesiology  Airway: normal  Neck Mobility: full range of motion  Mallampatti score: per anesthesia  History of anesthesia problems: no  Family history of anesthesia problems: no  Anesthesia Plan: MAC    Anesthesia/Surgery risks, benefits and alternative options discussed and understood by patient/family.          Active Hospital Problems    Diagnosis  POA    *Ureteral transection of left native kidney [S37.10XA]  Yes    Rectal ulcer [K62.6]  Yes    Status post insertion of percutaneous endoscopic gastrostomy (PEG) tube [Z93.1]  Not Applicable    Urinary tract infection without hematuria [N39.0]  Unknown    Delayed surgical wound healing [T81.89XA]  Yes    Bradycardia [R00.1]  No     The etiology of the bradycardic episode is unclear.  The have appear to be respiratory in origin (at least the 1st episode).  We will continue to monitor carefully.  We are awaiting evaluation by Cardiology.          BRBPR (bright red blood per rectum) [K62.5]  Unknown    Dermatitis associated with moisture [L30.8]  Yes    Anemia [D64.9]  Unknown    Postoperative infection [T81.40XA]  No    Acute postoperative respiratory failure [J95.821]  No    Pulmonary edema [J81.1]  No    On enteral nutrition [Z78.9]  Unknown    At risk for fluid volume overload [Z91.89]  Not Applicable    Altered mental status [R41.82]  Yes    Encounter for weaning from ventilator [Z99.11]  Not Applicable    Metabolic acidosis [E87.2]  No    Sepsis [A41.9]  Yes    Encephalopathy [G93.40]  Yes    Type 2 diabetes mellitus, with long-term current use of insulin [E11.9, Z79.4]  Not Applicable    HLD (hyperlipidemia) [E78.5]  Yes    Asthma [J45.909]  Yes    Essential hypertension [I10]  Yes    Arthritis of lumbar spine [M47.816]  Yes      Chronic    Type 2 diabetes mellitus with diabetic peripheral angiopathy without gangrene [E11.51]  Yes     Chronic    PAPA (acute kidney injury) [N17.9]  Yes    Sleep apnea [G47.30]  Yes     Chronic    CAD (coronary artery disease) [I25.10]  Yes     Chronic     10/21/13: LAD GINGER prox and distal, unable to cross 80% OM1 lesion.  Old OM1 stent in 2003, LAD stent in 2010        Resolved Hospital Problems   No resolved problems to display.

## 2019-05-24 NOTE — PROGRESS NOTES
[facility]  Urology  Progress Note    Patient Name: Mariann Huff  MRN: [id number]  Admission Date: 4/20/2019  Hospital Length of Stay: 34 days  Code Status: Full Code   Attending Provider: Robin Boyd MD   Primary Care Physician: Jasbir Haney MD    Subjective:     HPI:  Mariann Huff is a 59 y.o. female with history of HTN, type 2 diabetes mellitus, CAD, NSTEMI, and back pain who presents to [facility] with altered mental status and sepsis. She underwent L5-S1 OLIF with NSGY on 4/12 and had intraoperative left ureteral injury with ureteroureteral anastomosis and ureteral stent placement. She did well initially and had correa and MADHURI drain removed on 4/16. She began having chills and altered mental status 2 days ago and this has progressively worsened. No complaints of pain.     She is altered and HPI is limited. In the ED she is febrile to 103 and tachycardic and pressures are low normal. WBC is 5, creatinine is 3.6 baseline 1.0, lactate is 4.6. Cath UA concerning for infection, 3+ LE, >100 WBCs, and many bacteria on microscopy.    CT and MRI abdomen both show air with minimal fluid near the surgical site with left ureter coursing through. There is air throughout the proximal collecting system which is decompressed with JJ ureteral stent in good position.    Taken for ex lap, ligation of left ureter and left neph tube placement on 4/21/19.    Interval History: No acute events. Afebrile, WBC stable. No more bloody BM. H&H stable. Patient has been getting prep for colonoscopy through G tube.     Review of Systems  Objective:     Temp:  [98.3 °F (36.8 °C)-100.8 °F (38.2 °C)] 99.5 °F (37.5 °C)  Pulse:  [] 85  SpO2:  [90 %-100 %] 98 %  BP: ()/(52-94) 108/54     Body mass index is 27.88 kg/m².      Bladder Scan Volume (mL): 27 mL (05/10/19 0846)  Post Void Cath Residual Output (mL): 27 mL (05/10/19 0846)    Drains     Drain                 Nephrostomy 04/21/19 0629 Left 8 Fr. 32 days          Gastrostomy/Enterostomy 05/02/19 1134 Percutaneous endoscopic gastrostomy (PEG) LUQ decompression;feeding 20 days         Urethral Catheter 05/16/19 1040 16 Fr. 6 days                Physical Exam   Constitutional: She appears well-developed. No distress.   HENT:   Head: Normocephalic and atraumatic.   Neck: No JVD present.   Cardiovascular: Normal rate and regular rhythm.    Pulmonary/Chest: Effort normal. No respiratory distress.   On vent   Abdominal: Soft. She exhibits no distension. There is no rebound and no guarding.   Incision c/d/i   G-tube   Genitourinary:   Genitourinary Comments: Fontenot in place draining clear yellow urine  Left neph tube with clear yellow urine   Neurological: She is alert.   Skin: Skin is warm and dry. She is not diaphoretic. No pallor.         Significant Labs:    BMP:  Recent Labs   Lab 05/21/19  0339 05/22/19  0450 05/23/19  0432    142 138   K 4.3 4.1 4.4   * 112* 109   CO2 20* 22* 23   BUN 39* 32* 38*   CREATININE 0.8 0.7 1.2   CALCIUM 8.7 8.6* 8.7       CBC:   Recent Labs   Lab 05/22/19  0650 05/22/19  1815 05/23/19  0432   WBC 16.09* 18.30* 16.64*   HGB 7.6* 8.4* 6.8*   HCT 24.5* 27.0* 21.7*    267 246     Significant Imaging:  All pertinent imaging results/findings from the past 24 hours have been reviewed.                  Assessment/Plan:     * Ureteral transection of left native kidney  Mariann TRISTIN Huff is a 59 y.o. female s/p left ureteral injury on 4/12, presented with fever, AMS, and intraabdominal abscess, taken for washout and ligation of left ureter with neph tube placement on 4/21, Trach/PEG 5/2.  - on vent; wean per SICU  - Tube feeds per SICU  - ID on board, appreciate recs:   - continue rifampin; continue cefepime x 7 days today, transition back to Ancef; continue rifampin and Ancef    until 6/18/19   - 5/13 UCx growing Candida albicans; 7 day course of diflucan 800 mg   - 5/19 BAL growing Pseudomonas   - ID recommends reimaging patient should she  continue to show signs of infection  - Maintain neph tube and Fontenot  - Urine output adequate, Cr stable and WNL  - diuresis/fluids per SICU  - bloody BM   - flexible sigmoidoscopy on 5/21/19 showed no signs of bleeding, Colorectal recommends full colonoscopy     and EGD.    - GI on board, appreciate recs; colonoscopy today. EGD not indicated per GI.    Dispo: continue ICU care; plan for transfer to LTAC once stable. Colonoscopy today.    Sepsis  As above        VTE Risk Mitigation (From admission, onward)        Ordered     IP VTE LOW RISK PATIENT  Once      05/08/19 1315     Place sequential compression device  Until discontinued      04/20/19 9665          Mook Ferguson MD  Urology  [facility]

## 2019-05-24 NOTE — SUBJECTIVE & OBJECTIVE
"Interval HPI:   Overnight events: Remains in SICU. NAEON. BG below goal ranges.   Eating:   NPO  Nausea: No  Hypoglycemia and intervention: No  Fever: No  TPN and/or TF: Yes  If yes, type of TF/TPN and rate: paused    BP (!) 177/78 (BP Location: Left arm, Patient Position: Lying)   Pulse 76   Temp 98.4 °F (36.9 °C) (Oral)   Resp (!) 28   Ht 5' 5" (1.651 m)   Wt 77 kg (169 lb 12.1 oz)   SpO2 100%   Breastfeeding? No   BMI 28.25 kg/m²     Labs Reviewed and Include    Recent Labs   Lab 05/24/19  1145   GLU 91   CALCIUM 9.0   ALBUMIN 1.5*   PROT 6.2      K 3.8   CO2 22*   *   BUN 28*   CREATININE 0.8   ALKPHOS 120   ALT 9*   AST 20   BILITOT 0.6     Lab Results   Component Value Date    WBC 13.39 (H) 05/24/2019    HGB 8.0 (L) 05/24/2019    HCT 24.9 (L) 05/24/2019    MCV 90 05/24/2019     05/24/2019     No results for input(s): TSH, FREET4 in the last 168 hours.  Lab Results   Component Value Date    HGBA1C 5.4 05/24/2019       Nutritional status:   Body mass index is 28.25 kg/m².  Lab Results   Component Value Date    ALBUMIN 1.5 (L) 05/24/2019    ALBUMIN 1.4 (L) 05/24/2019    ALBUMIN 1.4 (L) 05/23/2019     No results found for: PREALBUMIN    Estimated Creatinine Clearance: 78.7 mL/min (based on SCr of 0.8 mg/dL).    Accu-Checks  Recent Labs     05/23/19  0433 05/23/19  0841 05/23/19  1204 05/23/19  1225 05/23/19  1600 05/23/19  2012 05/24/19  0012 05/24/19  0408 05/24/19  0806 05/24/19  1145   POCTGLUCOSE 127* 159* 137* 128* 102 106 109 111* 107 93       Current Medications and/or Treatments Impacting Glycemic Control  Immunotherapy:    Immunosuppressants     None        Steroids:   Hormones (From admission, onward)    None        Pressors:    Autonomic Drugs (From admission, onward)    None        Hyperglycemia/Diabetes Medications:   Antihyperglycemics (From admission, onward)    Start     Stop Route Frequency Ordered    05/24/19 8954  insulin aspart U-100 pen 0-5 Units      -- SubQ Every 6 " hours PRN 05/24/19 9425

## 2019-05-24 NOTE — H&P
Radiology History & Physical      SUBJECTIVE:     Chief Complaint: nephrostomy tube leak    History of Present Illness:  Mariann Huff is a 59 y.o. female with a history of hydronephrosis s/p nephrostomy placement, now with leaking around the drain who presents for nephrostomy tube exchange. She is also due to undergo a colonoscopy today due to concern for ongoing hematochezia.    Past Medical History:   Diagnosis Date    Anticoagulant long-term use     Asthma     Back pain     Bradycardia, unspecified 2019    The etiology of the bradycardic episode is unclear.  The have appear to be respiratory in origin (at least the 1st episode).  We will continue to monitor carefully.  We are awaiting evaluation by Cardiology.      CAD (coronary artery disease)     s/p stentimg  (2), (1)    Carotid artery stenosis     Diabetes mellitus type 2 in obese     HTN (hypertension), benign     Hyperlipidemia     Keloid cicatrix     NPDR (nonproliferative diabetic retinopathy) 2015    NSTEMI (non-ST elevated myocardial infarction)     Nuclear sclerosis - Right Eye 3/18/2014    Primary localized osteoarthrosis, lower leg 2014    Sleep apnea      Past Surgical History:   Procedure Laterality Date    BRONCHOSCOPY N/A 2019    Performed by Sean Ruano MD at [facility] OR 2ND FLR    CARDIAC CATHETERIZATION      cataract extraction left eye      cataracts      CATHETERIZATION, HEART, LEFT Left 2014    Performed by Wilman Kim MD at [facility] CATH LAB     SECTION, LOW TRANSVERSE      CORONARY ANGIOPLASTY      Creation, Nephrostomy, Percutaneous Left 2019    Performed by Karina Surgeon at [facility] KARINA    CREATION, TRACHEOSTOMY N/A 2019    Performed by Sean Ruano MD at [facility] OR 2ND FLR    EGD, WITH PEG TUBE INSERTION  2019    Performed by Sean Ruano MD at [facility] OR 2ND FLR    ESOPHAGOGASTRODUODENOSCOPY (EGD) N/A 2016    Performed by Gardenia Adamson,  MD at [facility] ENDO (4TH FLR)    EXCISION TURBINATE, SUBMUCOUS      FUSION, SPINE, LUMBAR, ANTERIOR APPROACH Left L5-S1 Anterior to Psoa Interbody Fusion, L5-S1 Posterior Instrumentation Left 4/12/2019    Performed by Mk George MD at [facility] OR 2ND FLR    HAND SURGERY Left     HAND SURGERY Right     torn ligament repair/ Dr. Yeboah    HYSTERECTOMY      Injection,steroid,epidural,transforaminal approach - Bilateral - S1 Bilateral 9/25/2018    Performed by Tl Abreu MD at [facility] MGT    Injection-steroid-epidural-caudal N/A 2/7/2019    Performed by Dave Bentley Jr., MD at [facility] PA[facility] MGT    INSERTION-INTRAOCULAR LENS (IOL) Right 9/1/2015    Performed by Good Domingo MD at [facility] OR 1ST FLR    LAPAROTOMY, EXPLORATORY; LIGATION OF LEFT URETER; DOUBLE J STENT REMOVAL LEFT URETER Left 4/20/2019    Performed by Paul Carlson MD at [facility] OR 2ND FLR    left foot surgery      left wrist surgery      NASAL SEPTUM SURGERY  5/7/15    PHACOEMULSIFICATION-ASPIRATION-CATARACT Right 9/1/2015    Performed by Good Domingo MD at [facility] OR 1ST FLR    REPAIR, URETER  4/12/2019    Performed by Mk George MD at [facility] OR 2ND FLR    RESECTION-TURBINATES (SMR) N/A 5/7/2015    Performed by Dileep Dubois III, MD at [facility] OR 2ND FLR    rt elbow surgery      S/P LAD COATED STENT  05/14/2010    6 total     S/P OM1 STENT  08/2003    SEPTOPLASTY N/A 5/7/2015    Performed by Dileep Dubois III, MD at [facility] OR 2ND FLR    SIGMOIDOSCOPY, FLEXIBLE N/A 5/21/2019    Performed by ALBERTO Amin MD at [facility] ENDO (2ND FLR)    SIGMOIDOSCOPY, FLEXIBLE N/A 5/13/2019    Performed by ALBERTO Amin MD at [facility] ENDO (2ND FLR)    SINUS SURGERY      F.E.S.S.    SINUS SURGERY FUNCTIONAL ENDOSCOPIC WITH NAVIGATION WITH MAXILLARIES, ETHMOIDS, LEFT SPHENOID, LEFT LOLY N/A 5/7/2015    Performed by Dileep Dubois III, MD at [facility] OR 2ND FLR    STENT, URETERAL placement  4/12/2019    Performed by Robin  "ALBERTO Boyd MD at [facility] OR [facility] F[facility]    TUBAL LIGATION         Home Meds:   Prior to Admission medications    Medication Sig Start Date End Date Taking? Authorizing Provider   albuterol (PROVENTIL/VENTOLIN HFA) 90 mcg/actuation inhaler Inhale 2 puffs into the lungs every 6 (six) hours as needed for Wheezing. 11/30/18  Yes Jasbir Haney MD   amLODIPine (NORVASC) 10 MG tablet TAKE 1 TABLET (10 MG TOTAL) BY MOUTH ONCE DAILY. 8/15/18  Yes Adelaide Aguilar MD   aspirin 81 mg Tab Take 81 mg by mouth. 1 Tablet Oral Every day   Yes Historical Provider, MD   atorvastatin (LIPITOR) 40 MG tablet TAKE 1 TABLET (40 MG TOTAL) BY MOUTH ONCE DAILY. 7/7/17  Yes Adelaide Aguilar MD   ACCU-CHEK EDIN PLUS METER Misc  9/23/14   Historical Provider, MD   BD INSULIN PEN NEEDLE UF MINI 31 x 3/16 " Ndle USE 4 TIMES A DAY 10/4/14   Rosmery Fisher NP   blood sugar diagnostic (ACCU-CHEK EDIN PLUS TEST STRP) Strp TEST BLOOD SUGARS 4 TIMES DAILY 8/31/15   Rosmery Fisher NP   chlorthalidone (HYGROTEN) 50 MG Tab Take 1 tablet (50 mg total) by mouth once daily. 12/7/18   Jasbir Haney MD   esomeprazole (NEXIUM) 40 MG capsule TAKE 1 CAPSULE (40 MG TOTAL) BY MOUTH BEFORE BREAKFAST. 12/6/18   Jasbir Haney MD   fenofibrate micronized (LOFIBRA) 134 MG Cap TAKE 1 CAPSULE (134 MG TOTAL) BY MOUTH BEFORE BREAKFAST. 12/7/18   Jasbir Haney MD   flash glucose scanning reader (FREESTYLE MISTI 14 DAY READER) Misc 1 each by Misc.(Non-Drug; Combo Route) route once daily. 12/7/18   Jasbir Haney MD   flash glucose sensor (FREESTYLE MISTI 14 DAY SENSOR) Kit 1 each by Misc.(Non-Drug; Combo Route) route once daily. 12/7/18   Jasbir Haney MD   gabapentin (NEURONTIN) 100 MG capsule Take 2 capsules (200 mg total) by mouth every evening. 7/23/18 7/23/19  Jasbir Haney MD   insulin aspart U-100 (NOVOLOG FLEXPEN U-100 INSULIN) 100 unit/mL InPn pen INJECT 35 U AM, 45 U AT NOON, 35 U PM.150-200 +2, 201-250 +4, 251-300 +6, 301-350 +8, >350 +10 12/7/18   Jasbir Haney MD   insulin glargine, " "TOUJEO, (TOUJEO SOLOSTAR U-300 INSULIN) 300 unit/mL (1.5 mL) InPn pen Inject 50 Units into the skin 2 (two) times daily. 10/26/18   SEBASTIAN Stone,ANP-C   INVOKANA 300 mg Tab tablet Take 1 tablet (300 mg total) by mouth once daily. 10/26/18   SEBASTIAN Stone,ANP-C   irbesartan (AVAPRO) 300 MG tablet Take 1 tablet (300 mg total) by mouth every evening. 11/7/18 11/7/19  Jasbir Haney MD   lancets 30 gauge Misc  1/9/17   Historical Provider, MD   metoprolol succinate (TOPROL-XL) 100 MG 24 hr tablet TAKE 1 TABLET (100 MG TOTAL) BY MOUTH ONCE DAILY. 1/10/19   Jasbir Haney MD   nitroGLYCERIN (NITROSTAT) 0.4 MG SL tablet Take one every 2-3 min till chestpain relief for 3 times and if still no relief, call MD or come to ED 1/6/17   Jasbir Haney MD   omega-3 acid ethyl esters (LOVAZA) 1 gram capsule Take 1 g by mouth once daily.  11/10/14   Historical Provider, MD   pen needle, diabetic (BD ULTRA-FINE GRABIEL PEN NEEDLES) 32 gauge x 5/32" Ndle For use with insulin pens daily 4 times a day 3/10/16   Jasbir Haney MD   prasugrel (EFFIENT) 10 mg Tab TAKE 1 TABLET (10 MG TOTAL) BY MOUTH ONCE DAILY. 12/6/18   Jasbir Haney MD   tamsulosin (FLOMAX) 0.4 mg Cap Take 1 capsule (0.4 mg total) by mouth every evening. 4/16/19   Robin Boyd MD   tramadol (ULTRAM) 50 mg tablet Take 1 tablet (50 mg total) by mouth 3 (three) times daily as needed for Pain. 10/24/16   Jasbir Haney MD   TRULICITY 1.5 mg/0.5 mL PnIj INJECT 1.5 MG INTO THE SKIN EVERY 7 DAYS. 3/26/19   Juan Urbina MD   zolpidem (AMBIEN) 5 MG Tab Take 1 tablet (5 mg total) by mouth nightly as needed. 12/7/18   Jasbir Haney MD     Anticoagulants/Antiplatelets: no anticoagulation    Allergies: Review of patient's allergies indicates:  No Known Allergies  Sedation History:  no adverse reactions    Review of Systems:   Hematological: no known coagulopathies  Respiratory: no shortness of breath  Cardiovascular: no chest pain  Gastrointestinal: no abdominal pain, GI bleeding  Genito-Urinary: no " dysuria  Musculoskeletal: negative  Neurological: no TIA or stroke symptoms         OBJECTIVE:     Vital Signs (Most Recent)  Temp: 99 °F (37.2 °C) (05/24/19 0700)  Pulse: 82 (05/24/19 0815)  Resp: (!) 28 (05/21/19 1500)  BP: (!) 143/68 (05/24/19 0800)  SpO2: 100 % (05/24/19 0815)    Physical Exam:  ASA: 3  Mallampati: 2    General: no acute distress, on vent  HEENT: normocephalic, atraumatic  Chest: unlabored breathing  Heart: regular heart rate  Abdomen: nondistended  Extremity: moves all extremities    Laboratory  Lab Results   Component Value Date    INR 1.1 05/24/2019       Lab Results   Component Value Date    WBC 14.11 (H) 05/24/2019    HGB 8.1 (L) 05/24/2019    HCT 24.9 (L) 05/24/2019    MCV 89 05/24/2019     05/24/2019      Lab Results   Component Value Date     05/24/2019     05/24/2019    K 3.9 05/24/2019     (H) 05/24/2019    CO2 19 (L) 05/24/2019    BUN 32 (H) 05/24/2019    CREATININE 0.9 05/24/2019    CALCIUM 8.8 05/24/2019    MG 1.8 05/24/2019    ALT 7 (L) 05/24/2019    AST 21 05/24/2019    ALBUMIN 1.4 (L) 05/24/2019    BILITOT 0.6 05/24/2019    BILIDIR 3.5 (H) 04/21/2019       ASSESSMENT/PLAN:     Sedation Plan: moderate  Patient will undergo nephrostomy tube exchange.      Obdulio Isaac MD PGY-I  Interventional Radiology  [facility]

## 2019-05-24 NOTE — ASSESSMENT & PLAN NOTE
BG goal 140 - 180    Discontinue Novolog 4 units q 4 hrs.   Low Dose SQ Insulin Correction Scale PRN for BG excursions.  BG monitoring q 4 hrs.     ** Please notify Endocrine for any change and/or advance in diet/TF**  ** Please call Endocrine for any BG related issues **    Discharge Recommendations:     TBD.

## 2019-05-24 NOTE — PROGRESS NOTES
[facility]  Colorectal Surgery  Progress Note    Patient Name: Mariann Huff  MRN: [id number]  Admission Date: 4/20/2019  Hospital Length of Stay: 33 days  Attending Physician: Robin Boyd MD    Subjective:     Interval History: BRBPR overnight, CTA neg    Post-Op Info:  Procedure(s) (LRB):  SIGMOIDOSCOPY, FLEXIBLE (N/A)   2 Days Post-Op      Medications:  Continuous Infusions:  Scheduled Meds:   [START ON 5/26/2019] ceFAZolin (ANCEF) IVPB  2 g Intravenous Q8H    ceFEPime (MAXIPIME) IVPB  2 g Intravenous Q8H    chlorhexidine  15 mL Mouth/Throat BID    fluconazole  800 mg Per G Tube Daily    [START ON 5/24/2019] insulin aspart U-100  3 Units Subcutaneous Q24H    [START ON 5/24/2019] insulin aspart U-100  3 Units Subcutaneous Q24H    [START ON 5/24/2019] insulin aspart U-100  3 Units Subcutaneous Q24H    [START ON 5/24/2019] insulin aspart U-100  3 Units Subcutaneous Q24H    [START ON 5/24/2019] insulin aspart U-100  3 Units Subcutaneous Q24H    insulin aspart U-100  3 Units Subcutaneous Q24H    loperamide  2 mg Per G Tube QID    miconazole nitrate 2%   Topical (Top) BID    pantoprazole  40 mg Per G Tube Daily    [START ON 5/24/2019] polyethylene glycol  2,000 mL Per G Tube Once    rifAMpin (RIFADIN) IVPB  300 mg Intravenous Q12H    scopolamine  1 patch Transdermal Q3 Days    silodosin  4 mg Per G Tube Daily     PRN Meds:   sodium chloride    acetaminophen    albuterol-ipratropium    dextrose 50%    glucagon (human recombinant)    hydrALAZINE    HYDROmorphone    hydrOXYzine    insulin aspart U-100    labetalol    magnesium sulfate IVPB    magnesium sulfate IVPB    ondansetron    oxyCODONE    promethazine (PHENERGAN) IVPB    sodium chloride 0.9%        Objective:     Vital Signs (Most Recent):  Temp: 100.1 °F (37.8 °C) (05/23/19 1900)  Pulse: 76 (05/23/19 2100)  Resp: (!) 28 (05/21/19 1500)  BP: (!) 146/70 (05/23/19 2100)  SpO2: 100 % (05/23/19 2100) Vital Signs  (24h Range):  Temp:  [99.5 °F (37.5 °C)-100.1 °F (37.8 °C)] 100.1 °F (37.8 °C)  Pulse:  [] 76  SpO2:  [83 %-100 %] 100 %  BP: ()/(52-87) 146/70     Intake/Output - Last 3 Shifts       05/21 0700 - 05/22 0659 05/22 0700 - 05/23 0659 05/23 0700 - 05/24 0659    I.V. (mL/kg) 145 (1.9) 328 (4.3) 303 (4)    Blood   350    NG/GT 2020 1910 2950    IV Piggyback 1000      Total Intake(mL/kg) 3165 (41.6) 2238 (29.4) 3603 (47.4)    Urine (mL/kg/hr) 1215 (0.7) 2790 (1.5) 1375 (1.3)    Stool 0 0 12    Total Output 1215 2790 1387    Net +1950 -552 +2216           Urine Occurrence 1 x      Stool Occurrence 3 x 3 x 2 x          Physical Exam   Constitutional: She is oriented to person, place, and time. No distress.   Eyes: EOM are normal.   Neck: Neck supple.   Cardiovascular: Normal rate and regular rhythm.   Pulmonary/Chest: Effort normal. No respiratory distress.   Abdominal: Soft. She exhibits no distension. There is no tenderness.   Musculoskeletal: Normal range of motion.   Neurological: She is alert and oriented to person, place, and time.   Skin: Skin is warm and dry.       Significant Labs:  BMP (Last 3 Results):   Recent Labs   Lab 05/21/19  0339 05/22/19  0450 05/23/19  0432   * 112* 115*    142 138   K 4.3 4.1 4.4   * 112* 109   CO2 20* 22* 23   BUN 39* 32* 38*   CREATININE 0.8 0.7 1.2   CALCIUM 8.7 8.6* 8.7   MG 2.0 1.8 1.6     CBC (Last 3 Results):   Recent Labs   Lab 05/22/19  1815 05/23/19  0432 05/23/19  1227   WBC 18.30* 16.64* 14.12*   RBC 2.93* 2.38* 2.88*   HGB 8.4* 6.8* 8.4*   HCT 27.0* 21.7* 26.0*    246 217   MCV 92 91 90   MCH 28.7 28.6 29.2   MCHC 31.1* 31.3* 32.3       Significant Diagnostics:  I have reviewed all pertinent imaging results/findings within the past 24 hours.    Assessment/Plan:     BRBPR (bright red blood per rectum)  Ms. Huff is a 58 yo F with PMH of HTN, DM II, CAD, NSTEMI, s/p L5-S1 OLIF with NSGY 4/12 c/b intraoperative left ureteral injury and  underwent ureteroureteral anastomoses and ureteral stent placement complicated by sepsis due to E.coli septicemia now s/p ex lap on 4/21 and PEG/Trach of the vent now having BRBPR.     S/p flex sig on 5/13, 5/21  Bleeding again overnight, plan for prep and cscope with GI tomorrow   Transfuse as needed, cont to trend H/H  cont to hold hep gtt  Please call with any questions or concerns               John Barker MD  Colorectal Surgery  [facility]

## 2019-05-24 NOTE — ASSESSMENT & PLAN NOTE
Ms. Huff is a 58 yo F with PMH of HTN, DM II, CAD, NSTEMI, s/p L5-S1 OLIF with NSGY 4/12 c/b intraoperative left ureteral injury and underwent ureteroureteral anastomoses and ureteral stent placement complicated by sepsis due to E.coli septicemia now s/p ex lap on 4/21 and PEG/Trach of the vent now having BRBPR.     S/p flex sig on 5/13, 5/21  plan for cscope with GI today   Transfuse as needed, cont to trend H/H  cont to hold hep gtt  Please call with any questions or concerns

## 2019-05-24 NOTE — PLAN OF CARE
05/24/19 1604   Post-Acute Status   Post-Acute Authorization Placement   Post-Acute Placement Status Awaiting Internal Medical Clearance  (Per Dr. Mook Ferguson the patient is not medically cleared for transfer to LTAC )     [facility] has submitted for auth with [employer] insurance. Auth is pending. Once authis obtain it is good for 7 days for LTAC auth. Even if auth is obtained today the patient is not medically cleared to transfer to LTAC at this time per Urology service. Will re-evaluate possible LTAC transfer on Monday. LTAC notified.

## 2019-05-24 NOTE — ASSESSMENT & PLAN NOTE
Ms. Huff is a 60 yo F with PMH of HTN, DM II, CAD, NSTEMI, s/p L5-S1 OLIF with NSGY 4/12 c/b intraoperative left ureteral injury and underwent ureteroureteral anastomoses and ureteral stent placement complicated by sepsis due to E.coli septicemia now s/p ex lap on 4/21 and PEG/Trach of the vent now having BRBPR.     S/p flex sig on 5/13, 5/21  Bleeding again overnight, plan for prep and cscope with GI tomorrow   Transfuse as needed, cont to trend H/H  cont to hold hep gtt  Please call with any questions or concerns

## 2019-05-24 NOTE — CONSULTS
Double lumen PICC placed to (R) BRACHIAL vein.   37 cm in length, 0 cm exposed, and 27cm arm circumference.  Lot # [id number]

## 2019-05-24 NOTE — PT/OT/SLP PROGRESS
Physical Therapy      Patient Name:  Mariann Huff   MRN:  [id number]    Patient not seen today secondary to (RN approved pt for ther exer in bed only. OT will see pt today and perform ther exer x 4 extremities. ). Will follow-up at a later date.    Cathy Taylor, PT   5/24/2019

## 2019-05-24 NOTE — ASSESSMENT & PLAN NOTE
60 y/o female with HTN, DMII, CAD, NSTEMI, hx of L5-S1 OLIF on 4/12 c/b intraoperative left ureteral injury s/p ureteroureteral anastomoses and ureteral stent placement presents with fevers and AMS found to have E. Coli septicemia and air and fluid within the retroperitoneal and within the left collecting system.    She underwent ex-lap and washout on 4/21. Per op note,  There were pockets of purulence evacuated. Posterior to the stent there was a pocket of purulent fluid and exposed hardware along the spinal vertebrae. The tissue along the distal and proximal end of ureter were friable and unhealthy. She underwent left nephrostomy tube placement. The stent was removed and several metal clips were placed on proximal end of the ureteral. OR cultures with Staph Lugdenensis. She has been on Cefazolin and Rifampin with a plan to complete 6 weeks.    ID re-consulted as patient developed fevers and a leukocytosis. Procalc 20. Abx broadened to Vancomycin, Cefepime, Rifampin and Fluconazole. Lines exchanged. 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  CXR with new L basilar consolidation c/f HAP.     Blood cx negative. Respiratory culture (endotracheal aspirate) - Pseudomonas. Urine cx - candida species.     Since broadening coverage her fever curve is improving. T max 100.8. WBC count trending down to 14 from 24. Tachycardic. PE still on differential.     5/23 CTA ABD Done and still with subcutaneous fluid collection larger but less fat stranding.      Plan  - Continue Cefepime x 7 days total for HAP. Would then transition back to Cefazolin plus Rifampin for prior E. Coli and Staph lugdenesis x 8 weeks total (estimated end date 6/18/19)  - Continue high dose Fluconazole 800mg IV daily x 7 days for C glabrata in urine  - If she continues to remain febrile, recommend aspiration/drainage of ABDf fluid collection  - ID  will sign off  - while on ancef/RIF needs on Mondays weekly ESR, CRP, CBC, CMP faxed to ID dept at [phone] - if going to LTAC then consult ID to follow.  - patient can fu in ID clinic 14 d from DC or after DC from LTAC  - will sign off

## 2019-05-24 NOTE — CONSULTS
[facility]  Palliative Medicine  Consult Note    Patient Name: Mariann Huff  MRN: [id number]  Admission Date: 4/20/2019  Hospital Length of Stay: 34 days  Code Status: Full Code   Attending Provider: Robin Boyd MD  Consulting Provider: DEVAN Mckeon  Primary Care Physician: Jasbir Haney MD  Principal Problem:Ureteral transection of left native kidney    Patient information was obtained from patient and EMR.      Inpatient consult to Palliative Care  Consult performed by: DEVAN Peralta  Consult ordered by: Christian Hyatt MD  Reason for consult: symptom management - pain   Assessment/Recommendations: Palliative medicine consult received and patient discussed with primary team.  Goal of consult is symptom management - pain     Impression Mrs. Huff is resting comfortably with eyes closed no  facial grimacing or moaning.  Arouses easily with verbal stimuli.  States no pain or discomforts at this time. Receiving oxygen b40% FiO2, 8 of PEEP, A/C ventilation trach collar, PEG intact nephrostomy tubes with clear yellow urine.      Symptom Management: Pain   - post surgical pain and pain associated with GI bleed   - Mrs. Huff states having no pain.   - Nursing notes indicate patient has had minimal pain  - At this time indicates pian in 4-5/10 and when offered states no need for pain medication  - Palliative medicine APRN discussed current pain medication strategies and encouraged her to ask for pain before pain was severe.   - Currently orders include   Acetaminophen 650 mg via j tube every 6 hrs as needed for mild to moderate pain  Oxycodone 5 mg via J tube every 6hrs as needed for moderate pain  Hydromorphone 1 mg IVP every 6 hrs as needed for severe pain  24 hr oral morphine equivalent : 5 mg     Advanced Care Planning   - no advanced directives received  -  is next of kin for medical decision making   - Full code per primary team.  Patient and family in  agreement with this plan  - Patient and family state understanding of current clinical condtion     Goals of Care:   As per patient and medical record the plan is for transition to LTAC  - Ms. Huff states being hopeful she will continue to recover      Plan Recommendation   Unable to recommend changes to pain medications at this time.   - Recommend optimizing current medications for 24 hrs to determine adjustments to current medications.  - in addition to pharmacological strategies, consider alternative pain management strategies  - relaxation - music: channnel 40     Distraction, meditation    Prayer -  is able to provide guided imagery   Please consult  and  for emotional support     Thank you for consult and the opportunity to participate in Ms. Huff care.     Palliative medicine spent 35 minutes of time in chart review, and face to face encounter with patient and developing plan of care.     SEBASTIAN Sandoval, ACNS-BC, OCN   Palliative Medicine   Ext [phone]

## 2019-05-24 NOTE — PLAN OF CARE
Problem: Adult Inpatient Plan of Care  Goal: Plan of Care Review  Outcome: Ongoing (interventions implemented as appropriate)  Patient on 40% FiO2, 8 of PEEP, A/C ventilation. All other VSS through shift. T-max 100.1 at start of shift. Patient did not report pain through shift. Patient received bowel prep overnight. BM progressing to clear through A.M., see flow sheet for details. Urinary output and nephrostomy output in flow sheet. Labs sent, PRNs given. All questions answered by RN, will continue to monitor through shift.

## 2019-05-24 NOTE — PT/OT/SLP PROGRESS
Occupational Therapy   Treatment    Name: Mariann Huff  MRN: [id number]  Admitting Diagnosis:  Ureteral transection of left native kidney  3 Days Post-Op    Recommendations:     Discharge Recommendations: rehabilitation facility  Discharge Equipment Recommendations:  (TBD )  Barriers to discharge:  Decreased caregiver support    Assessment:     Mariann Huff is a 59 y.o. female with a medical diagnosis of Ureteral transection of left native kidney.  She presents with RN hold for EOB/OOB, however pt able to participate in UE and LE exercises at bed level. Pt demo good participation and AROM with all exercises. Performance deficits affecting function are weakness, impaired endurance, impaired balance, gait instability, impaired self care skills, impaired functional mobilty, impaired cardiopulmonary response to activity, pain.     Rehab Prognosis:  Fair; patient would benefit from acute skilled OT services to address these deficits and reach maximum level of function.       Plan:     Patient to be seen 4 x/week to address the above listed problems via self-care/home management, therapeutic activities, therapeutic exercises  · Plan of Care Expires: 06/05/19  · Plan of Care Reviewed with: patient    Subjective     Pain/Comfort:  · Pain Rating 1: 0/10  · Pain Addressed 1: Distraction, Cessation of Activity  · Pain Rating Post-Intervention 1: 0/10    Objective:     Communicated with: RN prior to session.  Patient found HOB elevated with blood pressure cuff, telemetry, peripheral IV, tracheostomy, ventilator, pulse ox (continuous), correa catheter, PEG Tube upon OT entry to room.    General Precautions: Standard, fall   Orthopedic Precautions:N/A   Braces: N/A     Occupational Performance:     RN report pt only appropriate for bed level exercises 2/2 bowel prep for scope. Pt able to actively participate in UE and LE exercises.     BUE AROM exercises 15 x 1 for shoulder flex to 90, elbow flex/ext, wrist flex/ext, digit  flex/ext, and chest press while supine with HOB elevated  BLE AROM exercises 15 x 1 for hip abd/add, heel slides, and ankle pumps while supine with HOB elevated      AMPAC 6 Click ADL: 9    Treatment & Education:  Pt educated on role of OT/POC  Pt educated on importance of exercises and EOB/OOB as tolerated  Pt educated on edema management for LUE  White board/communication board updated    Patient left HOB elevated with all lines intact and call button in reachEducation:      GOALS:   Multidisciplinary Problems     Occupational Therapy Goals        Problem: Occupational Therapy Goal    Goal Priority Disciplines Outcome Interventions   Occupational Therapy Goal     OT, PT/OT Ongoing (interventions implemented as appropriate)    Description:  Goals to be met by: 6/5/19     Patient will increase functional independence with ADLs by performing:    UE Dressing with Set-up Assistance.  LE Dressing with Maximum Assistance and Assistive Devices as needed.  Grooming while standing with Minimal Assistance.  Toileting from bedside commode with Minimal Assistance for hygiene and clothing management.   Sitting at edge of bed x10 minutes with Supervision.  Rolling to Bilateral with Minimal Assistance.   Supine to sit with Minimal Assistance.  Toilet transfer to bedside commode with Minimal Assistance.                       Time Tracking:     OT Date of Treatment: 05/24/19  OT Start Time: 1057  OT Stop Time: 1107  OT Total Time (min): 10 min    Billable Minutes:Therapeutic Exercise 10    Ana Miller OT  5/24/2019

## 2019-05-24 NOTE — PROGRESS NOTES
[facility]  Colorectal Surgery  Progress Note    Patient Name: Mariann Huff  MRN: [id number]  Admission Date: 4/20/2019  Hospital Length of Stay: 34 days  Attending Physician: Robin Boyd MD    Subjective:     Interval History: mild bleeding last night around 8pm, otherwise tolerating prep well, plan for cscope with GI today    Post-Op Info:  Procedure(s) (LRB):  SIGMOIDOSCOPY, FLEXIBLE (N/A)   3 Days Post-Op      Medications:  Continuous Infusions:  Scheduled Meds:   [START ON 5/26/2019] ceFAZolin (ANCEF) IVPB  2 g Intravenous Q8H    ceFEPime (MAXIPIME) IVPB  2 g Intravenous Q8H    chlorhexidine  15 mL Mouth/Throat BID    fluconazole  800 mg Per G Tube Daily    insulin aspart U-100  3 Units Subcutaneous Q24H    insulin aspart U-100  3 Units Subcutaneous Q24H    insulin aspart U-100  3 Units Subcutaneous Q24H    insulin aspart U-100  3 Units Subcutaneous Q24H    insulin aspart U-100  3 Units Subcutaneous Q24H    insulin aspart U-100  3 Units Subcutaneous Q24H    loperamide  2 mg Per G Tube QID    miconazole nitrate 2%   Topical (Top) BID    pantoprazole  40 mg Per G Tube Daily    rifAMpin (RIFADIN) IVPB  300 mg Intravenous Q12H    scopolamine  1 patch Transdermal Q3 Days    silodosin  4 mg Per G Tube Daily     PRN Meds:   sodium chloride    acetaminophen    albuterol-ipratropium    dextrose 50%    glucagon (human recombinant)    hydrALAZINE    HYDROmorphone    hydrOXYzine    insulin aspart U-100    labetalol    magnesium sulfate IVPB    magnesium sulfate IVPB    ondansetron    oxyCODONE    promethazine (PHENERGAN) IVPB    sodium chloride 0.9%        Objective:     Vital Signs (Most Recent):  Temp: 99.2 °F (37.3 °C) (05/24/19 0300)  Pulse: 80 (05/24/19 0600)  Resp: (!) 28 (05/21/19 1500)  BP: (!) 168/74 (05/24/19 0600)  SpO2: 100 % (05/24/19 0600) Vital Signs (24h Range):  Temp:  [98.5 °F (36.9 °C)-100.1 °F (37.8 °C)] 99.2 °F (37.3 °C)  Pulse:  []  80  SpO2:  [83 %-100 %] 100 %  BP: (111-184)/(55-87) 168/74     Intake/Output - Last 3 Shifts       05/22 0700 - 05/23 0659 05/23 0700 - 05/24 0659    I.V. (mL/kg) 328 (4.3) 465 (6)    Blood  350    NG/GT 1910 4090    Total Intake(mL/kg) 2238 (29.4) 4905 (63.7)    Urine (mL/kg/hr) 2790 (1.5) 1885 (1)    Stool 0 18    Total Output 2790 1903    Net -552 +3002          Stool Occurrence 3 x 2 x          Physical Exam   Constitutional: She is oriented to person, place, and time. No distress.   Eyes: EOM are normal.   Neck: Neck supple.   Cardiovascular: Normal rate and regular rhythm.   Pulmonary/Chest: Effort normal. No respiratory distress.   Abdominal: Soft. She exhibits no distension. There is no tenderness.   Musculoskeletal: Normal range of motion.   Neurological: She is alert and oriented to person, place, and time.   Skin: Skin is warm and dry.       Significant Labs:  BMP (Last 3 Results):   Recent Labs   Lab 05/22/19  0450 05/23/19  0432 05/24/19  0349   * 115* 100    138 141   K 4.1 4.4 3.9   * 109 111*   CO2 22* 23 19*   BUN 32* 38* 32*   CREATININE 0.7 1.2 0.9   CALCIUM 8.6* 8.7 8.8   MG 1.8 1.6 1.8     CBC (Last 3 Results):   Recent Labs   Lab 05/23/19  0432 05/23/19  1227 05/24/19  0349   WBC 16.64* 14.12* 14.11*   RBC 2.38* 2.88* 2.80*   HGB 6.8* 8.4* 8.1*   HCT 21.7* 26.0* 24.9*    217 223   MCV 91 90 89   MCH 28.6 29.2 28.9   MCHC 31.3* 32.3 32.5       Significant Diagnostics:  I have reviewed all pertinent imaging results/findings within the past 24 hours.    Assessment/Plan:     BRBPR (bright red blood per rectum)  Ms. Huff is a 60 yo F with PMH of HTN, DM II, CAD, NSTEMI, s/p L5-S1 OLIF with NSGY 4/12 c/b intraoperative left ureteral injury and underwent ureteroureteral anastomoses and ureteral stent placement complicated by sepsis due to E.coli septicemia now s/p ex lap on 4/21 and PEG/Trach of the vent now having BRBPR.     S/p flex sig on 5/13, 5/21  plan for cscope with  GI today   Transfuse as needed, cont to trend H/H  cont to hold hep gtt  Please call with any questions or concerns               John Barker MD  Colorectal Surgery  [facility]

## 2019-05-24 NOTE — PLAN OF CARE
Problem: Occupational Therapy Goal  Goal: Occupational Therapy Goal  Goals to be met by: 6/5/19     Patient will increase functional independence with ADLs by performing:    UE Dressing with Set-up Assistance.  LE Dressing with Maximum Assistance and Assistive Devices as needed.  Grooming while standing with Minimal Assistance.  Toileting from bedside commode with Minimal Assistance for hygiene and clothing management.   Sitting at edge of bed x10 minutes with Supervision.  Rolling to Bilateral with Minimal Assistance.   Supine to sit with Minimal Assistance.  Toilet transfer to bedside commode with Minimal Assistance.      Outcome: Ongoing (interventions implemented as appropriate)  Goals remain appropriate     Comments: Continue OT POC     Ana Miller OT   5/24/2019

## 2019-05-24 NOTE — SUBJECTIVE & OBJECTIVE
Subjective:     Interval History: BRBPR overnight, CTA neg    Post-Op Info:  Procedure(s) (LRB):  SIGMOIDOSCOPY, FLEXIBLE (N/A)   2 Days Post-Op      Medications:  Continuous Infusions:  Scheduled Meds:   [START ON 5/26/2019] ceFAZolin (ANCEF) IVPB  2 g Intravenous Q8H    ceFEPime (MAXIPIME) IVPB  2 g Intravenous Q8H    chlorhexidine  15 mL Mouth/Throat BID    fluconazole  800 mg Per G Tube Daily    [START ON 5/24/2019] insulin aspart U-100  3 Units Subcutaneous Q24H    [START ON 5/24/2019] insulin aspart U-100  3 Units Subcutaneous Q24H    [START ON 5/24/2019] insulin aspart U-100  3 Units Subcutaneous Q24H    [START ON 5/24/2019] insulin aspart U-100  3 Units Subcutaneous Q24H    [START ON 5/24/2019] insulin aspart U-100  3 Units Subcutaneous Q24H    insulin aspart U-100  3 Units Subcutaneous Q24H    loperamide  2 mg Per G Tube QID    miconazole nitrate 2%   Topical (Top) BID    pantoprazole  40 mg Per G Tube Daily    [START ON 5/24/2019] polyethylene glycol  2,000 mL Per G Tube Once    rifAMpin (RIFADIN) IVPB  300 mg Intravenous Q12H    scopolamine  1 patch Transdermal Q3 Days    silodosin  4 mg Per G Tube Daily     PRN Meds:   sodium chloride    acetaminophen    albuterol-ipratropium    dextrose 50%    glucagon (human recombinant)    hydrALAZINE    HYDROmorphone    hydrOXYzine    insulin aspart U-100    labetalol    magnesium sulfate IVPB    magnesium sulfate IVPB    ondansetron    oxyCODONE    promethazine (PHENERGAN) IVPB    sodium chloride 0.9%        Objective:     Vital Signs (Most Recent):  Temp: 100.1 °F (37.8 °C) (05/23/19 1900)  Pulse: 76 (05/23/19 2100)  Resp: (!) 28 (05/21/19 1500)  BP: (!) 146/70 (05/23/19 2100)  SpO2: 100 % (05/23/19 2100) Vital Signs (24h Range):  Temp:  [99.5 °F (37.5 °C)-100.1 °F (37.8 °C)] 100.1 °F (37.8 °C)  Pulse:  [] 76  SpO2:  [83 %-100 %] 100 %  BP: ()/(52-87) 146/70     Intake/Output - Last 3 Shifts       05/21 0700 - 05/22  0659 05/22 0700 - 05/23 0659 05/23 0700 - 05/24 0659    I.V. (mL/kg) 145 (1.9) 328 (4.3) 303 (4)    Blood   350    NG/GT 2020 1910 2950    IV Piggyback 1000      Total Intake(mL/kg) 3165 (41.6) 2238 (29.4) 3603 (47.4)    Urine (mL/kg/hr) 1215 (0.7) 2790 (1.5) 1375 (1.3)    Stool 0 0 12    Total Output 1215 2790 1387    Net +1950 -552 +2216           Urine Occurrence 1 x      Stool Occurrence 3 x 3 x 2 x          Physical Exam   Constitutional: She is oriented to person, place, and time. No distress.   Eyes: EOM are normal.   Neck: Neck supple.   Cardiovascular: Normal rate and regular rhythm.   Pulmonary/Chest: Effort normal. No respiratory distress.   Abdominal: Soft. She exhibits no distension. There is no tenderness.   Musculoskeletal: Normal range of motion.   Neurological: She is alert and oriented to person, place, and time.   Skin: Skin is warm and dry.       Significant Labs:  BMP (Last 3 Results):   Recent Labs   Lab 05/21/19  0339 05/22/19  0450 05/23/19  0432   * 112* 115*    142 138   K 4.3 4.1 4.4   * 112* 109   CO2 20* 22* 23   BUN 39* 32* 38*   CREATININE 0.8 0.7 1.2   CALCIUM 8.7 8.6* 8.7   MG 2.0 1.8 1.6     CBC (Last 3 Results):   Recent Labs   Lab 05/22/19  1815 05/23/19  0432 05/23/19  1227   WBC 18.30* 16.64* 14.12*   RBC 2.93* 2.38* 2.88*   HGB 8.4* 6.8* 8.4*   HCT 27.0* 21.7* 26.0*    246 217   MCV 92 91 90   MCH 28.7 28.6 29.2   MCHC 31.1* 31.3* 32.3       Significant Diagnostics:  I have reviewed all pertinent imaging results/findings within the past 24 hours.

## 2019-05-25 LAB
ALBUMIN SERPL BCP-MCNC: 1.5 G/DL (ref 3.5–5.2)
ALP SERPL-CCNC: 114 U/L (ref 55–135)
ALT SERPL W/O P-5'-P-CCNC: 10 U/L (ref 10–44)
ANION GAP SERPL CALC-SCNC: 10 MMOL/L (ref 8–16)
AST SERPL-CCNC: 23 U/L (ref 10–40)
BACTERIA BLD CULT: NORMAL
BACTERIA BLD CULT: NORMAL
BASOPHILS # BLD AUTO: 0.05 K/UL (ref 0–0.2)
BASOPHILS NFR BLD: 0.4 % (ref 0–1.9)
BILIRUB SERPL-MCNC: 0.5 MG/DL (ref 0.1–1)
BUN SERPL-MCNC: 27 MG/DL (ref 6–20)
CALCIUM SERPL-MCNC: 9.1 MG/DL (ref 8.7–10.5)
CHLORIDE SERPL-SCNC: 110 MMOL/L (ref 95–110)
CO2 SERPL-SCNC: 24 MMOL/L (ref 23–29)
CREAT SERPL-MCNC: 0.9 MG/DL (ref 0.5–1.4)
DIFFERENTIAL METHOD: ABNORMAL
EOSINOPHIL # BLD AUTO: 0.9 K/UL (ref 0–0.5)
EOSINOPHIL NFR BLD: 8.2 % (ref 0–8)
ERYTHROCYTE [DISTWIDTH] IN BLOOD BY AUTOMATED COUNT: 14.5 % (ref 11.5–14.5)
EST. GFR  (AFRICAN AMERICAN): >60 ML/MIN/1.73 M^2
EST. GFR  (NON AFRICAN AMERICAN): >60 ML/MIN/1.73 M^2
GLUCOSE SERPL-MCNC: 89 MG/DL (ref 70–110)
HCT VFR BLD AUTO: 25 % (ref 37–48.5)
HGB BLD-MCNC: 8.1 G/DL (ref 12–16)
IMM GRANULOCYTES # BLD AUTO: 0.09 K/UL (ref 0–0.04)
IMM GRANULOCYTES NFR BLD AUTO: 0.8 % (ref 0–0.5)
INR PPP: 1.2 (ref 0.8–1.2)
LYMPHOCYTES # BLD AUTO: 1.6 K/UL (ref 1–4.8)
LYMPHOCYTES NFR BLD: 13.9 % (ref 18–48)
MAGNESIUM SERPL-MCNC: 1.7 MG/DL (ref 1.6–2.6)
MCH RBC QN AUTO: 29 PG (ref 27–31)
MCHC RBC AUTO-ENTMCNC: 32.4 G/DL (ref 32–36)
MCV RBC AUTO: 90 FL (ref 82–98)
MONOCYTES # BLD AUTO: 1.2 K/UL (ref 0.3–1)
MONOCYTES NFR BLD: 10.6 % (ref 4–15)
NEUTROPHILS # BLD AUTO: 7.5 K/UL (ref 1.8–7.7)
NEUTROPHILS NFR BLD: 66.1 % (ref 38–73)
NRBC BLD-RTO: 0 /100 WBC
PHOSPHATE SERPL-MCNC: 3.1 MG/DL (ref 2.7–4.5)
PLATELET # BLD AUTO: 228 K/UL (ref 150–350)
PMV BLD AUTO: 11.2 FL (ref 9.2–12.9)
POCT GLUCOSE: 106 MG/DL (ref 70–110)
POCT GLUCOSE: 86 MG/DL (ref 70–110)
POCT GLUCOSE: 87 MG/DL (ref 70–110)
POTASSIUM SERPL-SCNC: 3.3 MMOL/L (ref 3.5–5.1)
PROT SERPL-MCNC: 6.3 G/DL (ref 6–8.4)
PROTHROMBIN TIME: 11.8 SEC (ref 9–12.5)
RBC # BLD AUTO: 2.79 M/UL (ref 4–5.4)
SODIUM SERPL-SCNC: 144 MMOL/L (ref 136–145)
WBC # BLD AUTO: 11.27 K/UL (ref 3.9–12.7)

## 2019-05-25 PROCEDURE — 99900026 HC AIRWAY MAINTENANCE (STAT)

## 2019-05-25 PROCEDURE — 25000003 PHARM REV CODE 250: Performed by: STUDENT IN AN ORGANIZED HEALTH CARE EDUCATION/TRAINING PROGRAM

## 2019-05-25 PROCEDURE — 99233 PR SUBSEQUENT HOSPITAL CARE,LEVL III: ICD-10-PCS | Mod: ,,, | Performed by: SURGERY

## 2019-05-25 PROCEDURE — 80053 COMPREHEN METABOLIC PANEL: CPT

## 2019-05-25 PROCEDURE — 83735 ASSAY OF MAGNESIUM: CPT

## 2019-05-25 PROCEDURE — 99900035 HC TECH TIME PER 15 MIN (STAT)

## 2019-05-25 PROCEDURE — 85025 COMPLETE CBC W/AUTO DIFF WBC: CPT

## 2019-05-25 PROCEDURE — 27000221 HC OXYGEN, UP TO 24 HOURS

## 2019-05-25 PROCEDURE — 63600175 PHARM REV CODE 636 W HCPCS: Performed by: PSYCHOLOGIST

## 2019-05-25 PROCEDURE — 94770 HC EXHALED C02 TEST: CPT

## 2019-05-25 PROCEDURE — 85610 PROTHROMBIN TIME: CPT

## 2019-05-25 PROCEDURE — 99233 SBSQ HOSP IP/OBS HIGH 50: CPT | Mod: ,,, | Performed by: SURGERY

## 2019-05-25 PROCEDURE — 63600175 PHARM REV CODE 636 W HCPCS: Performed by: STUDENT IN AN ORGANIZED HEALTH CARE EDUCATION/TRAINING PROGRAM

## 2019-05-25 PROCEDURE — 84100 ASSAY OF PHOSPHORUS: CPT

## 2019-05-25 PROCEDURE — 94761 N-INVAS EAR/PLS OXIMETRY MLT: CPT

## 2019-05-25 PROCEDURE — 94003 VENT MGMT INPAT SUBQ DAY: CPT

## 2019-05-25 PROCEDURE — 20000000 HC ICU ROOM

## 2019-05-25 RX ORDER — POTASSIUM CHLORIDE 29.8 MG/ML
40 INJECTION INTRAVENOUS ONCE
Status: COMPLETED | OUTPATIENT
Start: 2019-05-25 | End: 2019-05-25

## 2019-05-25 RX ORDER — OXYCODONE AND ACETAMINOPHEN 10; 325 MG/1; MG/1
1 TABLET ORAL EVERY 4 HOURS PRN
Status: DISCONTINUED | OUTPATIENT
Start: 2019-05-25 | End: 2019-05-29

## 2019-05-25 RX ADMIN — RIFAMPIN 300 MG: 600 INJECTION, POWDER, LYOPHILIZED, FOR SOLUTION INTRAVENOUS at 02:05

## 2019-05-25 RX ADMIN — HYDRALAZINE HYDROCHLORIDE 10 MG: 20 INJECTION INTRAMUSCULAR; INTRAVENOUS at 04:05

## 2019-05-25 RX ADMIN — Medication 2 MG: at 05:05

## 2019-05-25 RX ADMIN — MAGNESIUM SULFATE IN WATER 2 G: 40 INJECTION, SOLUTION INTRAVENOUS at 08:05

## 2019-05-25 RX ADMIN — HYDROMORPHONE HYDROCHLORIDE 1 MG: 1 INJECTION, SOLUTION INTRAMUSCULAR; INTRAVENOUS; SUBCUTANEOUS at 07:05

## 2019-05-25 RX ADMIN — RIFAMPIN 300 MG: 600 INJECTION, POWDER, LYOPHILIZED, FOR SOLUTION INTRAVENOUS at 03:05

## 2019-05-25 RX ADMIN — Medication 2 MG: at 08:05

## 2019-05-25 RX ADMIN — HYDRALAZINE HYDROCHLORIDE 10 MG: 20 INJECTION INTRAMUSCULAR; INTRAVENOUS at 08:05

## 2019-05-25 RX ADMIN — CEFEPIME 2 G: 2 INJECTION, POWDER, FOR SOLUTION INTRAVENOUS at 04:05

## 2019-05-25 RX ADMIN — FLUCONAZOLE 800 MG: 200 TABLET ORAL at 08:05

## 2019-05-25 RX ADMIN — Medication 2 MG: at 12:05

## 2019-05-25 RX ADMIN — OXYCODONE HYDROCHLORIDE 5 MG: 5 TABLET ORAL at 05:05

## 2019-05-25 RX ADMIN — SILODOSIN 4 MG: 4 CAPSULE ORAL at 08:05

## 2019-05-25 RX ADMIN — PANTOPRAZOLE SODIUM 40 MG: 40 GRANULE, DELAYED RELEASE ORAL at 08:05

## 2019-05-25 RX ADMIN — CHLORHEXIDINE GLUCONATE 0.12% ORAL RINSE 15 ML: 1.2 LIQUID ORAL at 08:05

## 2019-05-25 RX ADMIN — MICONAZOLE NITRATE: 20 OINTMENT TOPICAL at 08:05

## 2019-05-25 RX ADMIN — HYDRALAZINE HYDROCHLORIDE 10 MG: 20 INJECTION INTRAMUSCULAR; INTRAVENOUS at 07:05

## 2019-05-25 RX ADMIN — CEFEPIME 2 G: 2 INJECTION, POWDER, FOR SOLUTION INTRAVENOUS at 08:05

## 2019-05-25 RX ADMIN — POTASSIUM CHLORIDE 40 MEQ: 400 INJECTION, SOLUTION INTRAVENOUS at 08:05

## 2019-05-25 RX ADMIN — SODIUM CHLORIDE, SODIUM LACTATE, POTASSIUM CHLORIDE, AND CALCIUM CHLORIDE 1000 ML: .6; .31; .03; .02 INJECTION, SOLUTION INTRAVENOUS at 10:05

## 2019-05-25 RX ADMIN — CEFEPIME 2 G: 2 INJECTION, POWDER, FOR SOLUTION INTRAVENOUS at 12:05

## 2019-05-25 RX ADMIN — CHLORHEXIDINE GLUCONATE 0.12% ORAL RINSE 15 ML: 1.2 LIQUID ORAL at 09:05

## 2019-05-25 NOTE — TRANSFER OF CARE
"Anesthesia Transfer of Care Note    Patient: Mariann Huff    Procedure(s) Performed: Procedure(s) (LRB):  COLONOSCOPY (N/A)    Patient location: ICU    Anesthesia Type: general (Pt remains Trached in the ICU on vent)    Post pain: adequate analgesia    Post assessment: no apparent anesthetic complications    Post vital signs: stable    Level of consciousness: sedated    Nausea/Vomiting: no nausea/vomiting    Complications: none    Transfer of care protocol was followed      Last vitals:   Visit Vitals  BP (!) 174/77 (BP Location: Left arm, Patient Position: Lying)   Pulse 75   Temp 36.9 °C (98.4 °F) (Oral)   Resp (!) 28   Ht 5' 5" (1.651 m)   Wt 77 kg (169 lb 12.1 oz)   SpO2 100%   Breastfeeding? No   BMI 28.25 kg/m²     "

## 2019-05-25 NOTE — ASSESSMENT & PLAN NOTE
Ms. Huff is a 58 yo F with PMH of HTN, DM II, CAD, NSTEMI, s/p L5-S1 OLIF with NSGY 4/12 c/b intraoperative left ureteral injury and underwent ureteroureteral anastomoses and ureteral stent placement complicated by sepsis due to E.coli septicemia now s/p ex lap on 4/21 and PEG/Trach of the vent now having BRBPR, s/p C scope showing only rectal ulcer.     S/p flex sig on 5/13, 5/21  S/p C scope 5/24 - rectal ulcer no other source of bleeding.   No more bloody BM.  Transfuse as needed, cont to trend H/H  cont to hold hep gtt  Please call with any questions or concerns

## 2019-05-25 NOTE — PROGRESS NOTES
Subjective/Objective  Interval History/Significant Events: Last bloody BM 5/23.  Colonoscopy showed no active bleeding but a healing ulcer in the rectum. HDS. On vent again, does not like to be on trach collar. -3.2L/24 hrs. Neph tube working well.      Follow-up For: Procedure(s) (LRB):  SIGMOIDOSCOPY, FLEXIBLE (N/A)    Post-Operative Day: 3 Days Post-Op    Objective:     Vital Signs (Most Recent):  Temp: 98.1 °F (36.7 °C) (05/25/19 0700)  Pulse: 77 (05/25/19 1100)  Resp: 20 (05/25/19 1100)  BP: (!) 178/84 (05/25/19 1100)  SpO2: 100 % (05/25/19 1100) Vital Signs (24h Range):  Temp:  [98.1 °F (36.7 °C)-99.3 °F (37.4 °C)] 98.1 °F (36.7 °C)  Pulse:  [63-86] 77  Resp:  [20-30] 20  SpO2:  [99 %-100 %] 100 %  BP: (159-187)/(72-91) 178/84     Weight: 77 kg (169 lb 12.1 oz)  Body mass index is 28.25 kg/m².      Intake/Output Summary (Last 24 hours) at 5/25/2019 1120  Last data filed at 5/25/2019 1100  Gross per 24 hour   Intake 180 ml   Output 3775 ml   Net -3595 ml       Physical Exam   Constitutional: She appears well-developed and well-nourished.   HENT:   Head: Normocephalic and atraumatic.   Tracheostomy in place   Eyes: Right eye exhibits no discharge. Left eye exhibits no discharge.   Neck: Normal range of motion. Neck supple.   Cardiovascular: Normal rate and regular rhythm.   Pulmonary/Chest: Effort normal. No respiratory distress.   Abdominal: Soft.   Midline incision c/d/i.  PEG with no leak. Abdomen soft, non-distended.  Bowel sounds present   Genitourinary:   Genitourinary Comments: Nephrostomy tube in place with yellow output.  Fontenot in place  No rectal bleeding since noon 5/23   Musculoskeletal: Normal range of motion.   Neurological: She is alert.   Able to follow commands, denying pain.    Skin: Skin is warm and dry.   Psychiatric: She has a normal mood and affect.   Nursing note and vitals reviewed.      Vents:  Vent Mode: A/C (05/25/19 1100)  Ventilator Initiated: Yes (05/08/19 0630)  Set Rate: 20 bmp  (05/25/19 1100)  Vt Set: 420 mL (05/25/19 1100)  Pressure Support: 10 cmH20 (05/23/19 1716)  PEEP/CPAP: 8 cmH20 (05/25/19 1100)  Oxygen Concentration (%): 41 (05/25/19 1100)  Peak Airway Pressure: 26 cmH2O (05/25/19 1100)  Plateau Pressure: 24 cmH20 (05/25/19 1100)  Total Ve: 8.71 mL (05/25/19 1100)  Negative Inspiratory Force (cm H2O): -18 (04/27/19 1306)  F/VT Ratio<105 (RSBI): (!) 43.76 (05/25/19 1100)    Lines/Drains/Airways     Peripherally Inserted Central Catheter Line                 PICC Double Lumen 05/24/19 0900 right brachial 1 day          Drain                 Nephrostomy 04/21/19 0629 Left 8 Fr. 34 days         Gastrostomy/Enterostomy 05/02/19 1134 Percutaneous endoscopic gastrostomy (PEG) LUQ decompression;feeding 22 days         Urethral Catheter 05/16/19 1040 16 Fr. 9 days          Airway                 Surgical Airway 05/02/19 1107 Shiley Cuffed 23 days          Peripheral Intravenous Line                 Peripheral IV - Single Lumen 05/23/19 0651 22 G Posterior;Right Hand 2 days                Significant Labs:    CBC/Anemia Profile:  Recent Labs   Lab 05/24/19  0349 05/24/19  1145 05/25/19  0500   WBC 14.11* 13.39* 11.27   HGB 8.1* 8.0* 8.1*   HCT 24.9* 24.9* 25.0*    231 228   MCV 89 90 90   RDW 14.6* 14.8* 14.5        Chemistries:  Recent Labs   Lab 05/24/19  0349 05/24/19  1145 05/25/19  0500    142 144   K 3.9 3.8 3.3*   * 111* 110   CO2 19* 22* 24   BUN 32* 28* 27*   CREATININE 0.9 0.8 0.9   CALCIUM 8.8 9.0 9.1   ALBUMIN 1.4* 1.5* 1.5*   PROT 6.0 6.2 6.3   BILITOT 0.6 0.6 0.5   ALKPHOS 119 120 114   ALT 7* 9* 10   AST 21 20 23   MG 1.8  --  1.7   PHOS 2.8  --  3.1       All pertinent labs within the past 24 hours have been reviewed.    Significant Imaging:  I have reviewed all pertinent imaging results/findings within the past 24 hours.      Scheduled Meds:   [START ON 5/26/2019] ceFAZolin (ANCEF) IVPB  2 g Intravenous Q8H    ceFEPime (MAXIPIME) IVPB  2 g Intravenous  Q8H    chlorhexidine  15 mL Mouth/Throat BID    fluconazole  800 mg Per G Tube Daily    lactated ringers  1,000 mL Intravenous Once    loperamide  2 mg Per G Tube QID    miconazole nitrate 2%   Topical (Top) BID    pantoprazole  40 mg Per G Tube Daily    rifAMpin (RIFADIN) IVPB  300 mg Intravenous Q12H    scopolamine  1 patch Transdermal Q3 Days    silodosin  4 mg Per G Tube Daily     Continuous Infusions:  PRN Meds:sodium chloride, acetaminophen, albuterol-ipratropium, dextrose 50%, glucagon (human recombinant), hydrALAZINE, HYDROmorphone, hydrOXYzine, insulin aspart U-100, labetalol, magnesium sulfate IVPB, magnesium sulfate IVPB, ondansetron, oxyCODONE, oxyCODONE-acetaminophen, promethazine (PHENERGAN) IVPB, sodium chloride 0.9%    Vital signs in last 24 hours:  Temp:  [98.1 °F (36.7 °C)-99.3 °F (37.4 °C)] 98.1 °F (36.7 °C)  Pulse:  [63-86] 77  Resp:  [20-30] 20  SpO2:  [99 %-100 %] 100 %  BP: (159-187)/(72-91) 178/84    Intake/Output last 3 shifts:  I/O last 3 completed shifts:  In: 3282 [I.V.:222; NG/GT:3060]  Out: 4615 [Urine:4615]  Intake/Output this shift:  I/O this shift:  In: -   Out: 250 [Urine:250]    Problem Assessment/Plan  Orthopedic  * Ureteral transection of left native kidney  Assessment & Plan  Mariann CROW Refugio is a 59 y.o. female s/p left ureteral injury on 4/12, who presented with fever, AMS, and intraabdominal abscess, taken for washout and ligation of left ureter with nephrostomy tube placement on 4/21. S/p Trach/PEG on 5/2. Episode of negative pressure pulmonary edema on 5/8 requiring mechanical ventilation, diuresis and low dose pressor. Has been plagued by intermittent BRBPR.     Neuro:   - off sedation  - responds to questions appropriately by nodding  - no focal deficit     Pulmonary:   - vent rate vs trach collar, does not like trach collar per nursing report   - ABGs prn  - diuresis as tolerates     Cardiac:   - alternating HTN and hypotension  - TTE 5/8 with no evidence of  right heart strain or significant valvular pathology, normal LV systolic function, EF 70%     Renal:    - S/p transection of left ureter 4/12 and subsequent ligation and PCT nephrostomy tube placement with IR 4/21  - Fontenot removed 5/15, but then replaced for retention   - UO adequate  - BUN/Cr stable  - Monitor I/Os        ID:   - AF  - EBC down  - Blood 5/10 NGTD  - Ucx 5/13 candiduria  - BAL 5/18 pseudomonas  - C diff 5/5 negative  - vanc stopped  - on fluconazole, cefepime, ancef      Hem/Onc:   - Hgb 6.8 5/24- 1U PRBC given  - stable now, continue to check CBC daily        Endocrine:  - DM Type 2  - TF @ goal 45  - LDSSI  - Endocrine following for assistance with blood glucose control.      Fluids/Electrolytes/Nutrition/GI:   - Free water flushes 300 cc q6h  - Replace electrolytes PRN     # rectal ulcers  - intermittent hematochezia; ulcers seen to be improving on partial colonoscopy 5/21  - no bleeding in last 24 hours  - anoscopy and hemostatic agent applied by CRS 5/15  - imodium QID  - C scope by CRS 5/24 - no active bleed, healing rectal ulcer      PPx:  - DVT: Lovenox, Hep gtt held for continued bloody BMs        DISPO:  - Continue SICU care  - restart TFs, trach collar               Critical secondary to Patient has a condition that poses threat to life and bodily function: Severe Respiratory Distress and rectal bleeding     Critical care was time spent personally by me on the following activities: development of treatment plan with patient or surrogate and bedside caregivers, discussions with consultants, evaluation of patient's response to treatment, examination of patient, ordering and performing treatments and interventions, ordering and review of laboratory studies, ordering and review of radiographic studies, pulse oximetry, re-evaluation of patient's condition.  This critical care time did not overlap with that of any other provider or involve time for any procedures.

## 2019-05-25 NOTE — SUBJECTIVE & OBJECTIVE
Subjective:     Interval History:     C scope yesterday showed only a rectal ulcer which is healing. She had no bleeding since C scope. Doing well. H&H stable.     Post-Op Info:  Procedure(s) (LRB):  COLONOSCOPY (N/A)   1 Day Post-Op      Medications:  Continuous Infusions:  Scheduled Meds:   [START ON 5/26/2019] ceFAZolin (ANCEF) IVPB  2 g Intravenous Q8H    ceFEPime (MAXIPIME) IVPB  2 g Intravenous Q8H    chlorhexidine  15 mL Mouth/Throat BID    fluconazole  800 mg Per G Tube Daily    loperamide  2 mg Per G Tube QID    miconazole nitrate 2%   Topical (Top) BID    pantoprazole  40 mg Per G Tube Daily    rifAMpin (RIFADIN) IVPB  300 mg Intravenous Q12H    scopolamine  1 patch Transdermal Q3 Days    silodosin  4 mg Per G Tube Daily     PRN Meds:   sodium chloride    acetaminophen    albuterol-ipratropium    dextrose 50%    glucagon (human recombinant)    hydrALAZINE    HYDROmorphone    hydrOXYzine    insulin aspart U-100    labetalol    magnesium sulfate IVPB    magnesium sulfate IVPB    ondansetron    oxyCODONE    oxyCODONE-acetaminophen    promethazine (PHENERGAN) IVPB    sodium chloride 0.9%        Objective:     Vital Signs (Most Recent):  Temp: 98.1 °F (36.7 °C) (05/25/19 0700)  Pulse: 72 (05/25/19 0800)  Resp: 20 (05/25/19 0800)  BP: (!) 187/87 (05/25/19 0800)  SpO2: 100 % (05/25/19 0800) Vital Signs (24h Range):  Temp:  [98.1 °F (36.7 °C)-99.3 °F (37.4 °C)] 98.1 °F (36.7 °C)  Pulse:  [63-86] 72  Resp:  [20-30] 20  SpO2:  [99 %-100 %] 100 %  BP: (153-187)/(68-91) 187/87     Intake/Output - Last 3 Shifts       05/23 0700 - 05/24 0659 05/24 0700 - 05/25 0659 05/25 0700 - 05/26 0659    I.V. (mL/kg) 465 (6) 60 (0.8)     Blood 350      NG/GT 4490 920     Total Intake(mL/kg) 5305 (68.9) 980 (12.7)     Urine (mL/kg/hr) 1885 (1) 3870 (2.1) 125 (0.9)    Stool 9 0     Total Output 1894 3870 125    Net +3411 -2890 -125           Stool Occurrence 23 x 21 x           Physical  Exam    Constitutional: She is oriented to person, place, and time. No distress.   Eyes: EOM are normal.   Neck: Neck supple.   Cardiovascular: Normal rate and regular rhythm.   Pulmonary/Chest: Trach in place and on the vent this morning.   Abdominal: Soft. She exhibits no distension. There is no tenderness.   Musculoskeletal: Normal range of motion.   Neurological: She is alert and oriented to person, place, and time.   Skin: Skin is warm and dry.      Significant Labs:  BMP (Last 3 Results):   Recent Labs   Lab 05/23/19  0432 05/24/19  0349 05/24/19  1145 05/25/19  0500   * 100 91 89    141 142 144   K 4.4 3.9 3.8 3.3*    111* 111* 110   CO2 23 19* 22* 24   BUN 38* 32* 28* 27*   CREATININE 1.2 0.9 0.8 0.9   CALCIUM 8.7 8.8 9.0 9.1   MG 1.6 1.8  --  1.7     CBC (Last 3 Results):   Recent Labs   Lab 05/24/19  0349 05/24/19  1145 05/25/19  0500   WBC 14.11* 13.39* 11.27   RBC 2.80* 2.76* 2.79*   HGB 8.1* 8.0* 8.1*   HCT 24.9* 24.9* 25.0*    231 228   MCV 89 90 90   MCH 28.9 29.0 29.0   MCHC 32.5 32.1 32.4

## 2019-05-25 NOTE — PROGRESS NOTES
[facility]  Colorectal Surgery  Progress Note    Patient Name: Mariann Huff  MRN: [id number]  Admission Date: 4/20/2019  Hospital Length of Stay: 35 days  Attending Physician: Robin Boyd MD    Subjective:     Interval History:     C scope yesterday showed only a rectal ulcer which is healing. She had no bleeding since C scope. Doing well. H&H stable.     Post-Op Info:  Procedure(s) (LRB):  COLONOSCOPY (N/A)   1 Day Post-Op      Medications:  Continuous Infusions:  Scheduled Meds:   [START ON 5/26/2019] ceFAZolin (ANCEF) IVPB  2 g Intravenous Q8H    ceFEPime (MAXIPIME) IVPB  2 g Intravenous Q8H    chlorhexidine  15 mL Mouth/Throat BID    fluconazole  800 mg Per G Tube Daily    loperamide  2 mg Per G Tube QID    miconazole nitrate 2%   Topical (Top) BID    pantoprazole  40 mg Per G Tube Daily    rifAMpin (RIFADIN) IVPB  300 mg Intravenous Q12H    scopolamine  1 patch Transdermal Q3 Days    silodosin  4 mg Per G Tube Daily     PRN Meds:   sodium chloride    acetaminophen    albuterol-ipratropium    dextrose 50%    glucagon (human recombinant)    hydrALAZINE    HYDROmorphone    hydrOXYzine    insulin aspart U-100    labetalol    magnesium sulfate IVPB    magnesium sulfate IVPB    ondansetron    oxyCODONE    oxyCODONE-acetaminophen    promethazine (PHENERGAN) IVPB    sodium chloride 0.9%        Objective:     Vital Signs (Most Recent):  Temp: 98.1 °F (36.7 °C) (05/25/19 0700)  Pulse: 72 (05/25/19 0800)  Resp: 20 (05/25/19 0800)  BP: (!) 187/87 (05/25/19 0800)  SpO2: 100 % (05/25/19 0800) Vital Signs (24h Range):  Temp:  [98.1 °F (36.7 °C)-99.3 °F (37.4 °C)] 98.1 °F (36.7 °C)  Pulse:  [63-86] 72  Resp:  [20-30] 20  SpO2:  [99 %-100 %] 100 %  BP: (153-187)/(68-91) 187/87     Intake/Output - Last 3 Shifts       05/23 0700 - 05/24 0659 05/24 0700 - 05/25 0659 05/25 0700 - 05/26 0659    I.V. (mL/kg) 465 (6) 60 (0.8)     Blood 350      NG/GT 4490 920     Total Intake(mL/kg)  5305 (68.9) 980 (12.7)     Urine (mL/kg/hr) 1885 (1) 3870 (2.1) 125 (0.9)    Stool 9 0     Total Output 1894 3870 125    Net +3411 -2890 -125           Stool Occurrence 23 x 21 x           Physical Exam    Constitutional: She is oriented to person, place, and time. No distress.   Eyes: EOM are normal.   Neck: Neck supple.   Cardiovascular: Normal rate and regular rhythm.   Pulmonary/Chest: Trach in place and on the vent this morning.   Abdominal: Soft. She exhibits no distension. There is no tenderness.   Musculoskeletal: Normal range of motion.   Neurological: She is alert and oriented to person, place, and time.   Skin: Skin is warm and dry.      Significant Labs:  BMP (Last 3 Results):   Recent Labs   Lab 05/23/19  0432 05/24/19  0349 05/24/19  1145 05/25/19  0500   * 100 91 89    141 142 144   K 4.4 3.9 3.8 3.3*    111* 111* 110   CO2 23 19* 22* 24   BUN 38* 32* 28* 27*   CREATININE 1.2 0.9 0.8 0.9   CALCIUM 8.7 8.8 9.0 9.1   MG 1.6 1.8  --  1.7     CBC (Last 3 Results):   Recent Labs   Lab 05/24/19  0349 05/24/19  1145 05/25/19  0500   WBC 14.11* 13.39* 11.27   RBC 2.80* 2.76* 2.79*   HGB 8.1* 8.0* 8.1*   HCT 24.9* 24.9* 25.0*    231 228   MCV 89 90 90   MCH 28.9 29.0 29.0   MCHC 32.5 32.1 32.4           Assessment/Plan:     BRBPR (bright red blood per rectum)  Ms. Huff is a 60 yo F with PMH of HTN, DM II, CAD, NSTEMI, s/p L5-S1 OLIF with NSGY 4/12 c/b intraoperative left ureteral injury and underwent ureteroureteral anastomoses and ureteral stent placement complicated by sepsis due to E.coli septicemia now s/p ex lap on 4/21 and PEG/Trach of the vent now having BRBPR, s/p C scope showing only rectal ulcer.     S/p flex sig on 5/13, 5/21  S/p C scope 5/24 - rectal ulcer no other source of bleeding.   Transfuse as needed, cont to trend H/H  cont to hold hep gtt  Please call with any questions or concerns           Maura Mcclellan MD  General Surgery PGY V  Beeper:  [phone]

## 2019-05-25 NOTE — SUBJECTIVE & OBJECTIVE
Subjective:     Interval History:     NO more bloody BM. H&H stable.     Post-Op Info:  Procedure(s) (LRB):  COLONOSCOPY (N/A)   1 Day Post-Op      Medications:  Continuous Infusions:  Scheduled Meds:   [START ON 5/26/2019] ceFAZolin (ANCEF) IVPB  2 g Intravenous Q8H    ceFEPime (MAXIPIME) IVPB  2 g Intravenous Q8H    chlorhexidine  15 mL Mouth/Throat BID    fluconazole  800 mg Per G Tube Daily    loperamide  2 mg Per G Tube QID    miconazole nitrate 2%   Topical (Top) BID    pantoprazole  40 mg Per G Tube Daily    rifAMpin (RIFADIN) IVPB  300 mg Intravenous Q12H    scopolamine  1 patch Transdermal Q3 Days    silodosin  4 mg Per G Tube Daily     PRN Meds:   sodium chloride    acetaminophen    albuterol-ipratropium    dextrose 50%    glucagon (human recombinant)    hydrALAZINE    HYDROmorphone    hydrOXYzine    insulin aspart U-100    labetalol    magnesium sulfate IVPB    magnesium sulfate IVPB    ondansetron    oxyCODONE    oxyCODONE-acetaminophen    promethazine (PHENERGAN) IVPB    sodium chloride 0.9%        Objective:     Vital Signs (Most Recent):  Temp: 98.1 °F (36.7 °C) (05/25/19 0700)  Pulse: 72 (05/25/19 0800)  Resp: 20 (05/25/19 0800)  BP: (!) 187/87 (05/25/19 0800)  SpO2: 100 % (05/25/19 0800) Vital Signs (24h Range):  Temp:  [98.1 °F (36.7 °C)-99.3 °F (37.4 °C)] 98.1 °F (36.7 °C)  Pulse:  [63-86] 72  Resp:  [20-30] 20  SpO2:  [99 %-100 %] 100 %  BP: (153-187)/(68-91) 187/87     Intake/Output - Last 3 Shifts       05/23 0700 - 05/24 0659 05/24 0700 - 05/25 0659 05/25 0700 - 05/26 0659    I.V. (mL/kg) 465 (6) 60 (0.8)     Blood 350      NG/GT 4490 920     Total Intake(mL/kg) 5305 (68.9) 980 (12.7)     Urine (mL/kg/hr) 1885 (1) 3870 (2.1) 125 (0.7)    Stool 9 0     Total Output 1894 3870 125    Net +3411 -2890 -125           Stool Occurrence 23 x 21 x           Physical Exam   Constitutional: She appears well-developed and well-nourished.   HENT:   Head: Normocephalic and  atraumatic.   Tracheostomy in place   Eyes: Right eye exhibits no discharge. Left eye exhibits no discharge.   Neck: Normal range of motion. Neck supple.   Cardiovascular: Normal rate and regular rhythm.   Pulmonary/Chest: Effort normal. No respiratory distress.   Abdominal: Soft.   Midline incision c/d/i.  PEG with no leak. Abdomen soft, non-distended.  Bowel sounds present   Genitourinary:   Genitourinary Comments: Nephrostomy tube in place with yellow output.  Fontenot in place  No rectal bleeding since noon 5/23   Musculoskeletal: Normal range of motion.   Neurological: She is alert.   Able to follow commands, denying pain.    Skin: Skin is warm and dry.   Psychiatric: She has a normal mood and affect.   Nursing note and vitals reviewed.      Significant Labs:  CBC:   Recent Labs   Lab 05/25/19  0500   WBC 11.27   RBC 2.79*   HGB 8.1*   HCT 25.0*      MCV 90   MCH 29.0   MCHC 32.4     CMP:   Recent Labs   Lab 05/25/19  0500   GLU 89   CALCIUM 9.1   ALBUMIN 1.5*   PROT 6.3      K 3.3*   CO2 24      BUN 27*   CREATININE 0.9   ALKPHOS 114   ALT 10   AST 23   BILITOT 0.5       Significant Diagnostics:  I have reviewed all pertinent imaging results/findings within the past 24 hours.

## 2019-05-25 NOTE — PLAN OF CARE
Problem: Adult Inpatient Plan of Care  Goal: Plan of Care Review  Outcome: Ongoing (interventions implemented as appropriate)  No s/s of distress today. Pt remains w/ trach on vent, current settings: A/C rate 20, PEEP 8, FiO2 40%. Tube feedings restarted today per orders.  Plan of care reviewed with pt and pt's spouse, but reinforcement of education required. Emotional support offered.

## 2019-05-25 NOTE — CARE UPDATE
BG goal 140 - 180     BG below goal at this time.     Continue:  Low Dose SQ Insulin Correction Scale PRN for BG excursions.  BG monitoring q 4 hrs.      ** Please notify Endocrine for any change and/or advance in diet/TF**    ** Please call Endocrine for any BG related issues **

## 2019-05-25 NOTE — SUBJECTIVE & OBJECTIVE
Interval History:   No acute events  Afebrile, WBC stable  H&H stable  C scope showed healing rectal ulcer, no bleeding overnight    Review of Systems    Objective:     Temp:  [98.1 °F (36.7 °C)-99.3 °F (37.4 °C)] 98.1 °F (36.7 °C)  Pulse:  [63-86] 72  Resp:  [20-30] 20  SpO2:  [99 %-100 %] 100 %  BP: (153-187)/(68-91) 187/87     Body mass index is 28.25 kg/m².    Date 05/25/19 0700 - 05/26/19 0659   Shift 6252-4003 3725-8321 6032-8685 24 Hour Total   INTAKE   Shift Total(mL/kg)       OUTPUT   Urine(mL/kg/hr) 125   125   Shift Total(mL/kg) 125(1.6)   125(1.6)   Weight (kg) 77 77 77 77     Bladder Scan Volume (mL): 27 mL (05/10/19 0846)  Post Void Cath Residual Output (mL): 27 mL (05/10/19 0846)    Drains     Drain                 Nephrostomy 04/21/19 0629 Left 8 Fr. 32 days         Gastrostomy/Enterostomy 05/02/19 1134 Percutaneous endoscopic gastrostomy (PEG) LUQ decompression;feeding 20 days         Urethral Catheter 05/16/19 1040 16 Fr. 6 days                Physical Exam   Constitutional: She appears well-developed. No distress.   HENT:   Head: Normocephalic and atraumatic.   Neck: No JVD present.   Cardiovascular: Normal rate and regular rhythm.    Pulmonary/Chest: Effort normal. No respiratory distress.   On vent   Abdominal: Soft. She exhibits no distension. There is no rebound and no guarding.   Incision c/d/i   G-tube   Genitourinary:   Genitourinary Comments: Fontenot in place draining clear yellow urine  Left neph tube with clear yellow urine   Neurological: She is alert.   Skin: Skin is warm and dry. She is not diaphoretic. No pallor.         Significant Labs:    BMP:  Recent Labs   Lab 05/24/19  0349 05/24/19  1145 05/25/19  0500    142 144   K 3.9 3.8 3.3*   * 111* 110   CO2 19* 22* 24   BUN 32* 28* 27*   CREATININE 0.9 0.8 0.9   CALCIUM 8.8 9.0 9.1       CBC:   Recent Labs   Lab 05/24/19  0349 05/24/19  1145 05/25/19  0500   WBC 14.11* 13.39* 11.27   HGB 8.1* 8.0* 8.1*   HCT 24.9* 24.9* 25.0*     231 201     Significant Imaging:  All pertinent imaging results/findings from the past 24 hours have been reviewed.

## 2019-05-25 NOTE — ASSESSMENT & PLAN NOTE
Ms. Huff is a 60 yo F with PMH of HTN, DM II, CAD, NSTEMI, s/p L5-S1 OLIF with NSGY 4/12 c/b intraoperative left ureteral injury and underwent ureteroureteral anastomoses and ureteral stent placement complicated by sepsis due to E.coli septicemia now s/p ex lap on 4/21 and PEG/Trach of the vent now having BRBPR, s/p C scope showing only rectal ulcer.     S/p flex sig on 5/13, 5/21  S/p C scope 5/24 - rectal ulcer no other source of bleeding.   Transfuse as needed, cont to trend H/H  cont to hold hep gtt  Please call with any questions or concerns

## 2019-05-25 NOTE — ANESTHESIA POSTPROCEDURE EVALUATION
Anesthesia Post Evaluation    Patient: Mariann Huff    Procedure(s) Performed: Procedure(s) (LRB):  COLONOSCOPY (N/A)    Final Anesthesia Type: general  Patient location during evaluation: ICU  Patient participation: No - Unable to Participate, Intubation  Level of consciousness: awake and alert  Post-procedure vital signs: reviewed and stable  Pain management: adequate  Airway patency: patent  PONV status at discharge: No PONV  Anesthetic complications: no      Cardiovascular status: stable  Respiratory status: ventilator (trach)  Hydration status: euvolemic  Follow-up not needed.          Vitals Value Taken Time   /78 5/24/2019  8:32 PM   Temp 37.4 °C (99.3 °F) 5/24/2019  8:00 PM   Pulse 70 5/24/2019  8:58 PM   Resp 20 5/24/2019  8:58 PM   SpO2 100 % 5/24/2019  8:58 PM   Vitals shown include unvalidated device data.      No case tracking events are documented in the log.      Pain/Derick Score: Pain Rating Prior to Med Admin: 10 (5/24/2019  8:20 PM)

## 2019-05-25 NOTE — SUBJECTIVE & OBJECTIVE
Interval History/Significant Events: Last bloody BM 5/23.  Colonoscopy showed no active bleeding but a healing ulcer in the rectum. HDS. On vent again, does not like to be on trach collar. -3.2L/24 hrs. Neph tube working well.      Follow-up For: Procedure(s) (LRB):  SIGMOIDOSCOPY, FLEXIBLE (N/A)    Post-Operative Day: 3 Days Post-Op    Objective:     Vital Signs (Most Recent):  Temp: 98.1 °F (36.7 °C) (05/25/19 0700)  Pulse: 77 (05/25/19 1100)  Resp: 20 (05/25/19 1100)  BP: (!) 178/84 (05/25/19 1100)  SpO2: 100 % (05/25/19 1100) Vital Signs (24h Range):  Temp:  [98.1 °F (36.7 °C)-99.3 °F (37.4 °C)] 98.1 °F (36.7 °C)  Pulse:  [63-86] 77  Resp:  [20-30] 20  SpO2:  [99 %-100 %] 100 %  BP: (159-187)/(72-91) 178/84     Weight: 77 kg (169 lb 12.1 oz)  Body mass index is 28.25 kg/m².      Intake/Output Summary (Last 24 hours) at 5/25/2019 1120  Last data filed at 5/25/2019 1100  Gross per 24 hour   Intake 180 ml   Output 3775 ml   Net -3595 ml       Physical Exam   Constitutional: She appears well-developed and well-nourished.   HENT:   Head: Normocephalic and atraumatic.   Tracheostomy in place   Eyes: Right eye exhibits no discharge. Left eye exhibits no discharge.   Neck: Normal range of motion. Neck supple.   Cardiovascular: Normal rate and regular rhythm.   Pulmonary/Chest: Effort normal. No respiratory distress.   Abdominal: Soft.   Midline incision c/d/i.  PEG with no leak. Abdomen soft, non-distended.  Bowel sounds present   Genitourinary:   Genitourinary Comments: Nephrostomy tube in place with yellow output.  Fontenot in place  No rectal bleeding since noon 5/23   Musculoskeletal: Normal range of motion.   Neurological: She is alert.   Able to follow commands, denying pain.    Skin: Skin is warm and dry.   Psychiatric: She has a normal mood and affect.   Nursing note and vitals reviewed.      Vents:  Vent Mode: A/C (05/25/19 1100)  Ventilator Initiated: Yes (05/08/19 0630)  Set Rate: 20 bmp (05/25/19 1100)  Vt Set:  420 mL (05/25/19 1100)  Pressure Support: 10 cmH20 (05/23/19 1716)  PEEP/CPAP: 8 cmH20 (05/25/19 1100)  Oxygen Concentration (%): 41 (05/25/19 1100)  Peak Airway Pressure: 26 cmH2O (05/25/19 1100)  Plateau Pressure: 24 cmH20 (05/25/19 1100)  Total Ve: 8.71 mL (05/25/19 1100)  Negative Inspiratory Force (cm H2O): -18 (04/27/19 1306)  F/VT Ratio<105 (RSBI): (!) 43.76 (05/25/19 1100)    Lines/Drains/Airways     Peripherally Inserted Central Catheter Line                 PICC Double Lumen 05/24/19 0900 right brachial 1 day          Drain                 Nephrostomy 04/21/19 0629 Left 8 Fr. 34 days         Gastrostomy/Enterostomy 05/02/19 1134 Percutaneous endoscopic gastrostomy (PEG) LUQ decompression;feeding 22 days         Urethral Catheter 05/16/19 1040 16 Fr. 9 days          Airway                 Surgical Airway 05/02/19 1107 Shiley Cuffed 23 days          Peripheral Intravenous Line                 Peripheral IV - Single Lumen 05/23/19 0651 22 G Posterior;Right Hand 2 days                Significant Labs:    CBC/Anemia Profile:  Recent Labs   Lab 05/24/19  0349 05/24/19  1145 05/25/19  0500   WBC 14.11* 13.39* 11.27   HGB 8.1* 8.0* 8.1*   HCT 24.9* 24.9* 25.0*    231 228   MCV 89 90 90   RDW 14.6* 14.8* 14.5        Chemistries:  Recent Labs   Lab 05/24/19  0349 05/24/19  1145 05/25/19  0500    142 144   K 3.9 3.8 3.3*   * 111* 110   CO2 19* 22* 24   BUN 32* 28* 27*   CREATININE 0.9 0.8 0.9   CALCIUM 8.8 9.0 9.1   ALBUMIN 1.4* 1.5* 1.5*   PROT 6.0 6.2 6.3   BILITOT 0.6 0.6 0.5   ALKPHOS 119 120 114   ALT 7* 9* 10   AST 21 20 23   MG 1.8  --  1.7   PHOS 2.8  --  3.1       All pertinent labs within the past 24 hours have been reviewed.    Significant Imaging:  I have reviewed all pertinent imaging results/findings within the past 24 hours.

## 2019-05-25 NOTE — ASSESSMENT & PLAN NOTE
Mariann Huff is a 59 y.o. female s/p left ureteral injury on 4/12, presented with fever, AMS, and intraabdominal abscess, taken for washout and ligation of left ureter with neph tube placement on 4/21, Trach/PEG 5/2.  - on vent; wean per SICU  - Tube feeds per SICU  - ID on board, appreciate recs:   - continue rifampin; continue cefepime x 7 days today, transition back to Ancef; continue rifampin and Ancef    until 6/18/19   - 5/13 UCx growing Candida albicans; 7 day course of diflucan 800 mg   - 5/19 BAL growing Pseudomonas   - ID recommends reimaging patient should she continue to show signs of infection  - Maintain neph tube and Fontenot  - Urine output adequate, Cr stable and WNL  - diuresis/fluids per SICU  - bloody BM   - flexible sigmoidoscopy on 5/21/19 showed no signs of bleeding, Colonoscopy showed healing rectal ulcer, EGD not indicated per GI.    Dispo: continue ICU care; plan for transfer to LTAC once stable. Holding heparin gtt currently

## 2019-05-25 NOTE — PROGRESS NOTES
[facility]  Urology  Progress Note    Patient Name: Mariann Huff  MRN: [id number]  Admission Date: 4/20/2019  Hospital Length of Stay: 35 days  Code Status: Full Code   Attending Provider: Robin Boyd MD   Primary Care Physician: Jasbir Haney MD    Subjective:     HPI:  Mariann Huff is a 59 y.o. female with history of HTN, type 2 diabetes mellitus, CAD, NSTEMI, and back pain who presents to [facility] with altered mental status and sepsis. She underwent L5-S1 OLIF with NSGY on 4/12 and had intraoperative left ureteral injury with ureteroureteral anastomosis and ureteral stent placement. She did well initially and had correa and MADHURI drain removed on 4/16. She began having chills and altered mental status 2 days ago and this has progressively worsened. No complaints of pain.     She is altered and HPI is limited. In the ED she is febrile to 103 and tachycardic and pressures are low normal. WBC is 5, creatinine is 3.6 baseline 1.0, lactate is 4.6. Cath UA concerning for infection, 3+ LE, >100 WBCs, and many bacteria on microscopy.    CT and MRI abdomen both show air with minimal fluid near the surgical site with left ureter coursing through. There is air throughout the proximal collecting system which is decompressed with JJ ureteral stent in good position.    Taken for ex lap, ligation of left ureter and left neph tube placement on 4/21/19.    Interval History:   No acute events  Afebrile, WBC stable  H&H stable  C scope showed healing rectal ulcer, no bleeding overnight    Review of Systems    Objective:     Temp:  [98.1 °F (36.7 °C)-99.3 °F (37.4 °C)] 98.1 °F (36.7 °C)  Pulse:  [63-86] 72  Resp:  [20-30] 20  SpO2:  [99 %-100 %] 100 %  BP: (153-187)/(68-91) 187/87     Body mass index is 28.25 kg/m².    Date 05/25/19 0700 - 05/26/19 0659   Shift 6072-3045 3963-4258 1675-3319 24 Hour Total   INTAKE   Shift Total(mL/kg)       OUTPUT   Urine(mL/kg/hr) 125   125   Shift Total(mL/kg) 125(1.6)   125(1.6)    Weight (kg) 77 77 77 77     Bladder Scan Volume (mL): 27 mL (05/10/19 0846)  Post Void Cath Residual Output (mL): 27 mL (05/10/19 0846)    Drains     Drain                 Nephrostomy 04/21/19 0629 Left 8 Fr. 32 days         Gastrostomy/Enterostomy 05/02/19 1134 Percutaneous endoscopic gastrostomy (PEG) LUQ decompression;feeding 20 days         Urethral Catheter 05/16/19 1040 16 Fr. 6 days                Physical Exam   Constitutional: She appears well-developed. No distress.   HENT:   Head: Normocephalic and atraumatic.   Neck: No JVD present.   Cardiovascular: Normal rate and regular rhythm.    Pulmonary/Chest: Effort normal. No respiratory distress.   On vent   Abdominal: Soft. She exhibits no distension. There is no rebound and no guarding.   Incision c/d/i   G-tube   Genitourinary:   Genitourinary Comments: Fontenot in place draining clear yellow urine  Left neph tube with clear yellow urine   Neurological: She is alert.   Skin: Skin is warm and dry. She is not diaphoretic. No pallor.         Significant Labs:    BMP:  Recent Labs   Lab 05/24/19  0349 05/24/19  1145 05/25/19  0500    142 144   K 3.9 3.8 3.3*   * 111* 110   CO2 19* 22* 24   BUN 32* 28* 27*   CREATININE 0.9 0.8 0.9   CALCIUM 8.8 9.0 9.1       CBC:   Recent Labs   Lab 05/24/19  0349 05/24/19  1145 05/25/19  0500   WBC 14.11* 13.39* 11.27   HGB 8.1* 8.0* 8.1*   HCT 24.9* 24.9* 25.0*    231 228     Significant Imaging:  All pertinent imaging results/findings from the past 24 hours have been reviewed.                  Assessment/Plan:     * Ureteral transection of left native kidney  Mariann Huff is a 59 y.o. female s/p left ureteral injury on 4/12, presented with fever, AMS, and intraabdominal abscess, taken for washout and ligation of left ureter with neph tube placement on 4/21, Trach/PEG 5/2.  - on vent; wean per SICU  - Tube feeds per SICU  - ID on board, appreciate recs:   - continue rifampin; continue cefepime x 7 days  today, transition back to Ancef; continue rifampin and Ancef    until 6/18/19   - 5/13 UCx growing Candida albicans; 7 day course of diflucan 800 mg   - 5/19 BAL growing Pseudomonas   - ID recommends reimaging patient should she continue to show signs of infection  - Maintain neph tube and Fontenot  - Urine output adequate, Cr stable and WNL  - diuresis/fluids per SICU  - bloody BM   - flexible sigmoidoscopy on 5/21/19 showed no signs of bleeding, Colonoscopy showed healing rectal ulcer, EGD not indicated per GI.    Dispo: continue ICU care; plan for transfer to LTAC once stable. Holding heparin gtt currently    Sepsis  As above        VTE Risk Mitigation (From admission, onward)        Ordered     IP VTE LOW RISK PATIENT  Once      05/08/19 1315     Place sequential compression device  Until discontinued      04/20/19 1276          Hima Neumann MD  Urology  [facility]

## 2019-05-25 NOTE — ASSESSMENT & PLAN NOTE
Mariann Huff is a 59 y.o. female s/p left ureteral injury on 4/12, who presented with fever, AMS, and intraabdominal abscess, taken for washout and ligation of left ureter with nephrostomy tube placement on 4/21. S/p Trach/PEG on 5/2. Episode of negative pressure pulmonary edema on 5/8 requiring mechanical ventilation, diuresis and low dose pressor. Has been plagued by intermittent BRBPR.     Neuro:   - off sedation  - responds to questions appropriately by nodding  - no focal deficit     Pulmonary:   - vent rate vs trach collar, does not like trach collar per nursing report   - ABGs prn  - diuresis as tolerates     Cardiac:   - alternating HTN and hypotension  - TTE 5/8 with no evidence of right heart strain or significant valvular pathology, normal LV systolic function, EF 70%     Renal:    - S/p transection of left ureter 4/12 and subsequent ligation and PCT nephrostomy tube placement with IR 4/21  - Fontenot removed 5/15, but then replaced for retention   - UO adequate  - BUN/Cr stable  - Monitor I/Os        ID:   - AF  - EBC down  - Blood 5/10 NGTD  - Ucx 5/13 candiduria  - BAL 5/18 pseudomonas  - C diff 5/5 negative  - vanc stopped  - on fluconazole, cefepime, ancef      Hem/Onc:   - Hgb 6.8 5/24- 1U PRBC given  - stable now, continue to check CBC daily        Endocrine:  - DM Type 2  - TF @ goal 45  - LDSSI  - Endocrine following for assistance with blood glucose control.      Fluids/Electrolytes/Nutrition/GI:   - Free water flushes 300 cc q6h  - Replace electrolytes PRN     # rectal ulcers  - intermittent hematochezia; ulcers seen to be improving on partial colonoscopy 5/21  - no bleeding in last 24 hours  - anoscopy and hemostatic agent applied by CRS 5/15  - imodium QID  - C scope by CRS 5/24 - no active bleed, healing rectal ulcer      PPx:  - DVT: Lovenox, Hep gtt held for continued bloody BMs        DISPO:  - Continue SICU care  - restart TFs, trach collar               Critical secondary to Patient  has a condition that poses threat to life and bodily function: Severe Respiratory Distress and rectal bleeding     Critical care was time spent personally by me on the following activities: development of treatment plan with patient or surrogate and bedside caregivers, discussions with consultants, evaluation of patient's response to treatment, examination of patient, ordering and performing treatments and interventions, ordering and review of laboratory studies, ordering and review of radiographic studies, pulse oximetry, re-evaluation of patient's condition.  This critical care time did not overlap with that of any other provider or involve time for any procedures.

## 2019-05-25 NOTE — [employer] PATIENT INSTRUCTIONS
Discharge Summary/Instructions after an Endoscopic Procedure  Patient Name: Mariann Huff  Patient MRN: [id number]  Patient YOB: 1960  Friday, May 24, 2019  Al Alaniz MD  RESTRICTIONS:  During your procedure today, you received medications for sedation.  These   medications may affect your judgment, balance and coordination.  Therefore,   for 24 hours, you have the following restrictions:   - DO NOT drive a car, operate machinery, make legal/financial decisions,   sign important papers or drink alcohol.    ACTIVITY:  Today: no heavy lifting, straining or running due to procedural   sedation/anesthesia.  The following day: return to full activity including work.  DIET:  Eat and drink normally unless instructed otherwise.     TREATMENT FOR COMMON SIDE EFFECTS:  - Mild abdominal pain, nausea, belching, bloating or excessive gas:  rest,   eat lightly and use a heating pad.  - Sore Throat: treat with throat lozenges and/or gargle with warm salt   water.  - Because air was used during the procedure, expelling large amounts of air   from your rectum or belching is normal.  - If a bowel prep was taken, you may not have a bowel movement for 1-3 days.    This is normal.  SYMPTOMS TO WATCH FOR AND REPORT TO YOUR PHYSICIAN:  1. Abdominal pain or bloating, other than gas cramps.  2. Chest pain.  3. Back pain.  4. Signs of infection such as: chills or fever occurring within 24 hours   after the procedure.  5. Rectal bleeding, which would show as bright red, maroon, or black stools.   (A tablespoon of blood from the rectum is not serious, especially if   hemorrhoids are present.)  6. Vomiting.  7. Weakness or dizziness.  GO DIRECTLY TO THE NEAREST EMERGENCY ROOM IF YOU HAVE ANY OF THE FOLLOWING:      Difficulty breathing              Chills and/or fever over 101 F   Persistent vomiting and/or vomiting blood   Severe abdominal pain   Severe chest pain   Black, tarry stools   Bleeding- more than one  tablespoon   Any other symptom or condition that you feel may need urgent attention  Your doctor recommends these additional instructions:  If any biopsies were taken, your doctors clinic will contact you in 1 to 2   weeks with any results.  - Observe patient in ICU.   - Resume previous diet.   - Continue present medications.   - No recommendation at this time regarding repeat colonoscopy.   For questions, problems or results please call your physician - Al Alaniz MD at Work:  [phone].  [facility], EMERGENCY ROOM PHONE NUMBER: [phone]  IF A COMPLICATION OR EMERGENCY SITUATION ARISES AND YOU ARE UNABLE TO REACH   YOUR PHYSICIAN - GO DIRECTLY TO THE EMERGENCY ROOM.  Al Alaniz MD  5/25/2019 9:05:50 AM  This report has been verified and signed electronically.  [employer]

## 2019-05-25 NOTE — TREATMENT PLAN
Colonoscopy done today    Full report to follow.  No blood noted in entire colon or the terminal ileum.   Ulcer at the rectum healing and not bleeding.  Otherwise exam normal    Currently patient has no active signs of bleeding  No indication for EGD evaluation.  Please continue to monitor  Can contact GI with recurrence of GI bleeding.

## 2019-05-26 LAB
ALBUMIN SERPL BCP-MCNC: 1.5 G/DL (ref 3.5–5.2)
ALBUMIN SERPL BCP-MCNC: 1.6 G/DL (ref 3.5–5.2)
ALP SERPL-CCNC: 120 U/L (ref 55–135)
ALP SERPL-CCNC: 128 U/L (ref 55–135)
ALT SERPL W/O P-5'-P-CCNC: 10 U/L (ref 10–44)
ALT SERPL W/O P-5'-P-CCNC: 9 U/L (ref 10–44)
ANION GAP SERPL CALC-SCNC: 7 MMOL/L (ref 8–16)
ANION GAP SERPL CALC-SCNC: 8 MMOL/L (ref 8–16)
AST SERPL-CCNC: 22 U/L (ref 10–40)
AST SERPL-CCNC: 22 U/L (ref 10–40)
BASOPHILS # BLD AUTO: 0.07 K/UL (ref 0–0.2)
BASOPHILS NFR BLD: 0.6 % (ref 0–1.9)
BILIRUB SERPL-MCNC: 0.5 MG/DL (ref 0.1–1)
BILIRUB SERPL-MCNC: 0.5 MG/DL (ref 0.1–1)
BUN SERPL-MCNC: 25 MG/DL (ref 6–20)
BUN SERPL-MCNC: 28 MG/DL (ref 6–20)
CALCIUM SERPL-MCNC: 8.5 MG/DL (ref 8.7–10.5)
CALCIUM SERPL-MCNC: 8.9 MG/DL (ref 8.7–10.5)
CHLORIDE SERPL-SCNC: 111 MMOL/L (ref 95–110)
CHLORIDE SERPL-SCNC: 112 MMOL/L (ref 95–110)
CO2 SERPL-SCNC: 22 MMOL/L (ref 23–29)
CO2 SERPL-SCNC: 23 MMOL/L (ref 23–29)
CREAT SERPL-MCNC: 0.8 MG/DL (ref 0.5–1.4)
CREAT SERPL-MCNC: 0.8 MG/DL (ref 0.5–1.4)
DIFFERENTIAL METHOD: ABNORMAL
EOSINOPHIL # BLD AUTO: 1.1 K/UL (ref 0–0.5)
EOSINOPHIL NFR BLD: 10.1 % (ref 0–8)
ERYTHROCYTE [DISTWIDTH] IN BLOOD BY AUTOMATED COUNT: 14.6 % (ref 11.5–14.5)
EST. GFR  (AFRICAN AMERICAN): >60 ML/MIN/1.73 M^2
EST. GFR  (AFRICAN AMERICAN): >60 ML/MIN/1.73 M^2
EST. GFR  (NON AFRICAN AMERICAN): >60 ML/MIN/1.73 M^2
EST. GFR  (NON AFRICAN AMERICAN): >60 ML/MIN/1.73 M^2
GLUCOSE SERPL-MCNC: 110 MG/DL (ref 70–110)
GLUCOSE SERPL-MCNC: 115 MG/DL (ref 70–110)
HCT VFR BLD AUTO: 25.3 % (ref 37–48.5)
HGB BLD-MCNC: 8.1 G/DL (ref 12–16)
IMM GRANULOCYTES # BLD AUTO: 0.12 K/UL (ref 0–0.04)
IMM GRANULOCYTES NFR BLD AUTO: 1.1 % (ref 0–0.5)
INR PPP: 1.1 (ref 0.8–1.2)
LYMPHOCYTES # BLD AUTO: 2 K/UL (ref 1–4.8)
LYMPHOCYTES NFR BLD: 18 % (ref 18–48)
MAGNESIUM SERPL-MCNC: 2.2 MG/DL (ref 1.6–2.6)
MCH RBC QN AUTO: 28.7 PG (ref 27–31)
MCHC RBC AUTO-ENTMCNC: 32 G/DL (ref 32–36)
MCV RBC AUTO: 90 FL (ref 82–98)
MONOCYTES # BLD AUTO: 1 K/UL (ref 0.3–1)
MONOCYTES NFR BLD: 8.4 % (ref 4–15)
NEUTROPHILS # BLD AUTO: 7 K/UL (ref 1.8–7.7)
NEUTROPHILS NFR BLD: 61.8 % (ref 38–73)
NRBC BLD-RTO: 0 /100 WBC
PHOSPHATE SERPL-MCNC: 2.6 MG/DL (ref 2.7–4.5)
PLATELET # BLD AUTO: 235 K/UL (ref 150–350)
PMV BLD AUTO: 11.4 FL (ref 9.2–12.9)
POCT GLUCOSE: 109 MG/DL (ref 70–110)
POCT GLUCOSE: 116 MG/DL (ref 70–110)
POTASSIUM SERPL-SCNC: 3.5 MMOL/L (ref 3.5–5.1)
POTASSIUM SERPL-SCNC: 4.4 MMOL/L (ref 3.5–5.1)
PROT SERPL-MCNC: 6.1 G/DL (ref 6–8.4)
PROT SERPL-MCNC: 6.1 G/DL (ref 6–8.4)
PROTHROMBIN TIME: 11.5 SEC (ref 9–12.5)
RBC # BLD AUTO: 2.82 M/UL (ref 4–5.4)
SODIUM SERPL-SCNC: 141 MMOL/L (ref 136–145)
SODIUM SERPL-SCNC: 142 MMOL/L (ref 136–145)
WBC # BLD AUTO: 11.28 K/UL (ref 3.9–12.7)

## 2019-05-26 PROCEDURE — 85610 PROTHROMBIN TIME: CPT

## 2019-05-26 PROCEDURE — 25000003 PHARM REV CODE 250: Performed by: PSYCHOLOGIST

## 2019-05-26 PROCEDURE — 94003 VENT MGMT INPAT SUBQ DAY: CPT

## 2019-05-26 PROCEDURE — 63600175 PHARM REV CODE 636 W HCPCS: Performed by: STUDENT IN AN ORGANIZED HEALTH CARE EDUCATION/TRAINING PROGRAM

## 2019-05-26 PROCEDURE — 63600175 PHARM REV CODE 636 W HCPCS

## 2019-05-26 PROCEDURE — 27000221 HC OXYGEN, UP TO 24 HOURS

## 2019-05-26 PROCEDURE — 85025 COMPLETE CBC W/AUTO DIFF WBC: CPT

## 2019-05-26 PROCEDURE — 25000003 PHARM REV CODE 250: Performed by: STUDENT IN AN ORGANIZED HEALTH CARE EDUCATION/TRAINING PROGRAM

## 2019-05-26 PROCEDURE — 84100 ASSAY OF PHOSPHORUS: CPT

## 2019-05-26 PROCEDURE — 99232 PR SUBSEQUENT HOSPITAL CARE,LEVL II: ICD-10-PCS | Mod: ,,, | Performed by: NURSE PRACTITIONER

## 2019-05-26 PROCEDURE — 80053 COMPREHEN METABOLIC PANEL: CPT | Mod: 91

## 2019-05-26 PROCEDURE — 99900026 HC AIRWAY MAINTENANCE (STAT)

## 2019-05-26 PROCEDURE — 94761 N-INVAS EAR/PLS OXIMETRY MLT: CPT

## 2019-05-26 PROCEDURE — 80053 COMPREHEN METABOLIC PANEL: CPT

## 2019-05-26 PROCEDURE — 99900035 HC TECH TIME PER 15 MIN (STAT)

## 2019-05-26 PROCEDURE — 99232 SBSQ HOSP IP/OBS MODERATE 35: CPT | Mod: ,,, | Performed by: NURSE PRACTITIONER

## 2019-05-26 PROCEDURE — 83735 ASSAY OF MAGNESIUM: CPT

## 2019-05-26 PROCEDURE — 20000000 HC ICU ROOM

## 2019-05-26 RX ORDER — FUROSEMIDE 10 MG/ML
INJECTION INTRAMUSCULAR; INTRAVENOUS
Status: COMPLETED
Start: 2019-05-26 | End: 2019-05-26

## 2019-05-26 RX ORDER — POTASSIUM CHLORIDE 20 MEQ/15ML
60 SOLUTION ORAL ONCE
Status: COMPLETED | OUTPATIENT
Start: 2019-05-26 | End: 2019-05-26

## 2019-05-26 RX ADMIN — MICONAZOLE NITRATE: 20 OINTMENT TOPICAL at 09:05

## 2019-05-26 RX ADMIN — CEFAZOLIN 2 G: 1 INJECTION, POWDER, FOR SOLUTION INTRAMUSCULAR; INTRAVENOUS at 06:05

## 2019-05-26 RX ADMIN — SCOPALAMINE 1 PATCH: 1 PATCH, EXTENDED RELEASE TRANSDERMAL at 09:05

## 2019-05-26 RX ADMIN — CHLORHEXIDINE GLUCONATE 0.12% ORAL RINSE 15 ML: 1.2 LIQUID ORAL at 08:05

## 2019-05-26 RX ADMIN — FUROSEMIDE 20 MG: 10 INJECTION, SOLUTION INTRAMUSCULAR; INTRAVENOUS at 08:05

## 2019-05-26 RX ADMIN — SILODOSIN 4 MG: 4 CAPSULE ORAL at 08:05

## 2019-05-26 RX ADMIN — Medication 2 MG: at 05:05

## 2019-05-26 RX ADMIN — ONDANSETRON 4 MG: 2 INJECTION INTRAMUSCULAR; INTRAVENOUS at 08:05

## 2019-05-26 RX ADMIN — OXYCODONE HYDROCHLORIDE AND ACETAMINOPHEN 1 TABLET: 10; 325 TABLET ORAL at 07:05

## 2019-05-26 RX ADMIN — PANTOPRAZOLE SODIUM 40 MG: 40 GRANULE, DELAYED RELEASE ORAL at 08:05

## 2019-05-26 RX ADMIN — HYDROMORPHONE HYDROCHLORIDE 1 MG: 1 INJECTION, SOLUTION INTRAMUSCULAR; INTRAVENOUS; SUBCUTANEOUS at 07:05

## 2019-05-26 RX ADMIN — PROMETHAZINE HYDROCHLORIDE 12.5 MG: 25 INJECTION INTRAMUSCULAR; INTRAVENOUS at 09:05

## 2019-05-26 RX ADMIN — CEFAZOLIN 2 G: 1 INJECTION, POWDER, FOR SOLUTION INTRAMUSCULAR; INTRAVENOUS at 10:05

## 2019-05-26 RX ADMIN — ACETAMINOPHEN 650 MG: 325 TABLET ORAL at 11:05

## 2019-05-26 RX ADMIN — CHLORHEXIDINE GLUCONATE 0.12% ORAL RINSE 15 ML: 1.2 LIQUID ORAL at 09:05

## 2019-05-26 RX ADMIN — CEFAZOLIN 2 G: 1 INJECTION, POWDER, FOR SOLUTION INTRAMUSCULAR; INTRAVENOUS at 02:05

## 2019-05-26 RX ADMIN — RIFAMPIN 300 MG: 600 INJECTION, POWDER, LYOPHILIZED, FOR SOLUTION INTRAVENOUS at 02:05

## 2019-05-26 RX ADMIN — SODIUM CHLORIDE, SODIUM LACTATE, POTASSIUM CHLORIDE, AND CALCIUM CHLORIDE 500 ML: .6; .31; .03; .02 INJECTION, SOLUTION INTRAVENOUS at 01:05

## 2019-05-26 RX ADMIN — POTASSIUM CHLORIDE 60 MEQ: 20 SOLUTION ORAL at 08:05

## 2019-05-26 RX ADMIN — Medication 2 MG: at 12:05

## 2019-05-26 RX ADMIN — FLUCONAZOLE 800 MG: 200 TABLET ORAL at 08:05

## 2019-05-26 RX ADMIN — RIFAMPIN 300 MG: 600 INJECTION, POWDER, LYOPHILIZED, FOR SOLUTION INTRAVENOUS at 03:05

## 2019-05-26 NOTE — ASSESSMENT & PLAN NOTE
Mariann Huff is a 59 y.o. female s/p left ureteral injury on 4/12, presented with fever, AMS, and intraabdominal abscess, taken for washout and ligation of left ureter with neph tube placement on 4/21, Trach/PEG 5/2.  - on vent; wean per SICU, possibly to trach collar today  - Tube feeds per SICU  - ID on board, appreciate recs:   - continue rifampin; continue cefepime x 7 days, transition back to Ancef; continue rifampin and Ancef    until 6/18/19   - 5/13 UCx growing Candida albicans; 7 day course of diflucan 800 mg   - 5/19 BAL growing Pseudomonas   - ID recommends reimaging patient should she continue to show signs of infection  - Maintain neph tube and Fontenot  - Urine output adequate, Cr stable and WNL  - diuresis/fluids per SICU  - bloody BM   - flexible sigmoidoscopy on 5/21/19 showed no signs of bleeding, Colonoscopy showed healing rectal ulcer, EGD not indicated per GI.    Dispo: continue ICU care; plan for transfer to LTAC once stable. Holding heparin gtt currently

## 2019-05-26 NOTE — SUBJECTIVE & OBJECTIVE
Interval History/Significant Events: No acute events overnight. On vent still, does not like to be on trach collar. Will attempt trach collar again today.        Objective:     Vital Signs (Most Recent):  Temp: 98.3 °F (36.8 °C) (05/26/19 0700)  Pulse: 75 (05/26/19 0926)  Resp: (!) 21 (05/26/19 0926)  BP: (!) 167/81 (05/26/19 0900)  SpO2: 100 % (05/26/19 0926) Vital Signs (24h Range):  Temp:  [98.3 °F (36.8 °C)-100.3 °F (37.9 °C)] 98.3 °F (36.8 °C)  Pulse:  [63-80] 75  Resp:  [20-25] 21  SpO2:  [100 %] 100 %  BP: (116-187)/(55-85) 167/81     Weight: 77 kg (169 lb 12.1 oz)  Body mass index is 28.25 kg/m².      Intake/Output Summary (Last 24 hours) at 5/26/2019 1112  Last data filed at 5/26/2019 1000  Gross per 24 hour   Intake 762 ml   Output 973 ml   Net -211 ml       Physical Exam   Constitutional: She appears well-developed and well-nourished.   HENT:   Head: Normocephalic and atraumatic.   Tracheostomy in place   Eyes: Right eye exhibits no discharge. Left eye exhibits no discharge.   Neck: Normal range of motion. Neck supple.   Cardiovascular: Normal rate and regular rhythm.   Pulmonary/Chest: Effort normal. No respiratory distress.   Abdominal: Soft.   Midline incision c/d/i.  PEG with no leak. Abdomen soft, non-distended.  Bowel sounds present   Genitourinary:   Genitourinary Comments: Nephrostomy tube in place with yellow output.  Fontenot in place  No rectal bleeding since noon 5/23   Musculoskeletal: Normal range of motion.   Neurological: She is alert.   Able to follow commands, denying pain.    Skin: Skin is warm and dry.   Psychiatric: She has a normal mood and affect.   Nursing note and vitals reviewed.      Vents:  Vent Mode: A/C (05/26/19 0926)  Ventilator Initiated: Yes (05/08/19 0630)  Set Rate: 20 bmp (05/26/19 0926)  Vt Set: 420 mL (05/26/19 0926)  Pressure Support: 10 cmH20 (05/23/19 1716)  PEEP/CPAP: 8 cmH20 (05/26/19 0926)  Oxygen Concentration (%): 42 (05/26/19 0926)  Peak Airway Pressure: 26  cmH2O (05/26/19 0926)  Plateau Pressure: 18 cmH20 (05/26/19 0926)  Total Ve: 8.4 mL (05/26/19 0926)  Negative Inspiratory Force (cm H2O): -18 (04/27/19 1306)  F/VT Ratio<105 (RSBI): (!) 52.9 (05/26/19 0926)    Lines/Drains/Airways     Peripherally Inserted Central Catheter Line                 PICC Double Lumen 05/24/19 0900 right brachial 2 days          Drain                 Nephrostomy 04/21/19 0629 Left 8 Fr. 35 days         Gastrostomy/Enterostomy 05/02/19 1134 Percutaneous endoscopic gastrostomy (PEG) LUQ decompression;feeding 23 days         Urethral Catheter 05/16/19 1040 16 Fr. 10 days          Airway                 Surgical Airway 05/02/19 1107 Shiley Cuffed 24 days          Peripheral Intravenous Line                 Peripheral IV - Single Lumen 05/23/19 0651 22 G Posterior;Right Hand 3 days                Significant Labs:    CBC/Anemia Profile:  Recent Labs   Lab 05/24/19  1145 05/25/19  0500 05/26/19  0300   WBC 13.39* 11.27 11.28   HGB 8.0* 8.1* 8.1*   HCT 24.9* 25.0* 25.3*    228 235   MCV 90 90 90   RDW 14.8* 14.5 14.6*        Chemistries:  Recent Labs   Lab 05/24/19  1145 05/25/19  0500 05/26/19  0300    144 142   K 3.8 3.3* 3.5   * 110 111*   CO2 22* 24 23   BUN 28* 27* 28*   CREATININE 0.8 0.9 0.8   CALCIUM 9.0 9.1 8.9   ALBUMIN 1.5* 1.5* 1.5*   PROT 6.2 6.3 6.1   BILITOT 0.6 0.5 0.5   ALKPHOS 120 114 128   ALT 9* 10 10   AST 20 23 22   MG  --  1.7 2.2   PHOS  --  3.1 2.6*       All pertinent labs within the past 24 hours have been reviewed.    Significant Imaging:  I have reviewed all pertinent imaging results/findings within the past 24 hours.

## 2019-05-26 NOTE — PROGRESS NOTES
[facility]  Colorectal Surgery  Progress Note    Patient Name: Mariann Huff  MRN: [id number]  Admission Date: 4/20/2019  Hospital Length of Stay: 36 days  Attending Physician: Robin Boyd MD    Subjective:     Interval History:     NO more bloody BM. H&H stable.     Post-Op Info:  Procedure(s) (LRB):  COLONOSCOPY (N/A)   1 Day Post-Op      Medications:  Continuous Infusions:  Scheduled Meds:   [START ON 5/26/2019] ceFAZolin (ANCEF) IVPB  2 g Intravenous Q8H    ceFEPime (MAXIPIME) IVPB  2 g Intravenous Q8H    chlorhexidine  15 mL Mouth/Throat BID    fluconazole  800 mg Per G Tube Daily    loperamide  2 mg Per G Tube QID    miconazole nitrate 2%   Topical (Top) BID    pantoprazole  40 mg Per G Tube Daily    rifAMpin (RIFADIN) IVPB  300 mg Intravenous Q12H    scopolamine  1 patch Transdermal Q3 Days    silodosin  4 mg Per G Tube Daily     PRN Meds:   sodium chloride    acetaminophen    albuterol-ipratropium    dextrose 50%    glucagon (human recombinant)    hydrALAZINE    HYDROmorphone    hydrOXYzine    insulin aspart U-100    labetalol    magnesium sulfate IVPB    magnesium sulfate IVPB    ondansetron    oxyCODONE    oxyCODONE-acetaminophen    promethazine (PHENERGAN) IVPB    sodium chloride 0.9%        Objective:     Vital Signs (Most Recent):  Temp: 98.1 °F (36.7 °C) (05/25/19 0700)  Pulse: 72 (05/25/19 0800)  Resp: 20 (05/25/19 0800)  BP: (!) 187/87 (05/25/19 0800)  SpO2: 100 % (05/25/19 0800) Vital Signs (24h Range):  Temp:  [98.1 °F (36.7 °C)-99.3 °F (37.4 °C)] 98.1 °F (36.7 °C)  Pulse:  [63-86] 72  Resp:  [20-30] 20  SpO2:  [99 %-100 %] 100 %  BP: (153-187)/(68-91) 187/87     Intake/Output - Last 3 Shifts       05/23 0700 - 05/24 0659 05/24 0700 - 05/25 0659 05/25 0700 - 05/26 0659    I.V. (mL/kg) 465 (6) 60 (0.8)     Blood 350      NG/GT 4490 920     Total Intake(mL/kg) 5305 (68.9) 980 (12.7)     Urine (mL/kg/hr) 1885 (1) 3870 (2.1) 125 (0.7)    Stool 9 0      Total Output 1894 3870 125    Net +3411 -2890 -125           Stool Occurrence 23 x 21 x           Physical Exam   Constitutional: She appears well-developed and well-nourished.   HENT:   Head: Normocephalic and atraumatic.   Tracheostomy in place   Eyes: Right eye exhibits no discharge. Left eye exhibits no discharge.   Neck: Normal range of motion. Neck supple.   Cardiovascular: Normal rate and regular rhythm.   Pulmonary/Chest: Effort normal. No respiratory distress.   Abdominal: Soft.   Midline incision c/d/i.  PEG with no leak. Abdomen soft, non-distended.  Bowel sounds present   Genitourinary:   Genitourinary Comments: Nephrostomy tube in place with yellow output.  Fontenot in place  No rectal bleeding since noon 5/23   Musculoskeletal: Normal range of motion.   Neurological: She is alert.   Able to follow commands, denying pain.    Skin: Skin is warm and dry.   Psychiatric: She has a normal mood and affect.   Nursing note and vitals reviewed.      Significant Labs:  CBC:   Recent Labs   Lab 05/25/19  0500   WBC 11.27   RBC 2.79*   HGB 8.1*   HCT 25.0*      MCV 90   MCH 29.0   MCHC 32.4     CMP:   Recent Labs   Lab 05/25/19  0500   GLU 89   CALCIUM 9.1   ALBUMIN 1.5*   PROT 6.3      K 3.3*   CO2 24      BUN 27*   CREATININE 0.9   ALKPHOS 114   ALT 10   AST 23   BILITOT 0.5       Significant Diagnostics:  I have reviewed all pertinent imaging results/findings within the past 24 hours.    Assessment/Plan:     BRBPR (bright red blood per rectum)  Ms. Huff is a 58 yo F with PMH of HTN, DM II, CAD, NSTEMI, s/p L5-S1 OLIF with NSGY 4/12 c/b intraoperative left ureteral injury and underwent ureteroureteral anastomoses and ureteral stent placement complicated by sepsis due to E.coli septicemia now s/p ex lap on 4/21 and PEG/Trach of the vent now having BRBPR, s/p C scope showing only rectal ulcer.     S/p flex sig on 5/13, 5/21  S/p C scope 5/24 - rectal ulcer no other source of bleeding.   No more  bloody BM.  Transfuse as needed, cont to trend H/H  cont to hold hep gtt  Please call with any questions or concerns         Maura Mcclellan MD  General Surgery PGY V  Beeper: [phone]

## 2019-05-26 NOTE — ASSESSMENT & PLAN NOTE
Mariann Huff is a 59 y.o. female s/p left ureteral injury on 4/12, who presented with fever, AMS, and intraabdominal abscess, taken for washout and ligation of left ureter with nephrostomy tube placement on 4/21. S/p Trach/PEG on 5/2. Episode of negative pressure pulmonary edema on 5/8 requiring mechanical ventilation, diuresis and low dose pressor. Has been plagued by intermittent BRBPR.     Neuro:   - off sedation  - responds to questions appropriately by nodding  - no focal deficit     Pulmonary:   - vent rate vs trach collar, does not like trach collar per nursing report, will try again today   - ABGs prn  - diuresis as tolerates     Cardiac:   - alternating HTN and hypotension  - TTE 5/8 with no evidence of right heart strain or significant valvular pathology, normal LV systolic function, EF 70%     Renal:    - S/p transection of left ureter 4/12 and subsequent ligation and PCT nephrostomy tube placement with IR 4/21  - Fontenot removed 5/15, but then replaced for retention   - UO adequate  - BUN/Cr stable  - Monitor I/Os        ID:   - AF  - EBC down  - Blood 5/10 NGTD  - Ucx 5/13 candiduria  - BAL 5/18 pseudomonas  - C diff 5/5 negative  - vanc stopped  - on fluconazole, cefepime, ancef      Hem/Onc:   - 5/24- 1U PRBC given  - stable now, continue to check CBC daily      Endocrine:  - DM Type 2  - TF @ goal 45  - LDSSI  - Endocrine following for assistance with blood glucose control.      Fluids/Electrolytes/Nutrition/GI:   - Free water flushes 300 cc q6h  - Replace electrolytes PRN   Rectal Ulcers  - intermittent hematochezia; ulcers seen to be improving on partial colonoscopy 5/21  - no bleeding in last 24 hours  - anoscopy and hemostatic agent applied by CRS 5/15  - imodium QID  - C scope by CRS 5/24 - no active bleed, healing rectal ulcer      PPx:  - DVT: Lovenox, Hep gtt held for continued bloody BMs        DISPO:  - Continue SICU care  - trach collar trials  - LTAC planning tomorrow                 Critical secondary to Patient has a condition that poses threat to life and bodily function: Severe Respiratory Distress and rectal bleeding     Critical care was time spent personally by me on the following activities: development of treatment plan with patient or surrogate and bedside caregivers, discussions with consultants, evaluation of patient's response to treatment, examination of patient, ordering and performing treatments and interventions, ordering and review of laboratory studies, ordering and review of radiographic studies, pulse oximetry, re-evaluation of patient's condition.  This critical care time did not overlap with that of any other provider or involve time for any procedures.

## 2019-05-26 NOTE — PROGRESS NOTES
Subjective/Objective  Interval History/Significant Events: No acute events overnight. On vent still, does not like to be on trach collar. Will attempt CPAP again today.        Objective:     Vital Signs (Most Recent):  Temp: 98.3 °F (36.8 °C) (05/26/19 0700)  Pulse: 75 (05/26/19 0926)  Resp: (!) 21 (05/26/19 0926)  BP: (!) 167/81 (05/26/19 0900)  SpO2: 100 % (05/26/19 0926) Vital Signs (24h Range):  Temp:  [98.3 °F (36.8 °C)-100.3 °F (37.9 °C)] 98.3 °F (36.8 °C)  Pulse:  [63-80] 75  Resp:  [20-25] 21  SpO2:  [100 %] 100 %  BP: (116-187)/(55-85) 167/81     Weight: 77 kg (169 lb 12.1 oz)  Body mass index is 28.25 kg/m².      Intake/Output Summary (Last 24 hours) at 5/26/2019 1112  Last data filed at 5/26/2019 1000  Gross per 24 hour   Intake 762 ml   Output 973 ml   Net -211 ml       Physical Exam   Constitutional: She appears well-developed and well-nourished.   HENT:   Head: Normocephalic and atraumatic.   Tracheostomy in place   Eyes: Right eye exhibits no discharge. Left eye exhibits no discharge.   Neck: Normal range of motion. Neck supple.   Cardiovascular: Normal rate and regular rhythm.   Pulmonary/Chest: Effort normal. No respiratory distress.   Abdominal: Soft.   Midline incision c/d/i.  PEG with no leak. Abdomen soft, non-distended.  Bowel sounds present   Genitourinary:   Genitourinary Comments: Nephrostomy tube in place with yellow output.  Fontenot in place  No rectal bleeding since noon 5/23   Musculoskeletal: Normal range of motion.   Neurological: She is alert.   Able to follow commands, denying pain.    Skin: Skin is warm and dry.   Psychiatric: She has a normal mood and affect.   Nursing note and vitals reviewed.      Vents:  Vent Mode: A/C (05/26/19 0926)  Ventilator Initiated: Yes (05/08/19 0630)  Set Rate: 20 bmp (05/26/19 0926)  Vt Set: 420 mL (05/26/19 0926)  Pressure Support: 10 cmH20 (05/23/19 1716)  PEEP/CPAP: 8 cmH20 (05/26/19 0926)  Oxygen Concentration (%): 42 (05/26/19 0926)  Peak Airway  Pressure: 26 cmH2O (05/26/19 0926)  Plateau Pressure: 18 cmH20 (05/26/19 0926)  Total Ve: 8.4 mL (05/26/19 0926)  Negative Inspiratory Force (cm H2O): -18 (04/27/19 1306)  F/VT Ratio<105 (RSBI): (!) 52.9 (05/26/19 0926)    Lines/Drains/Airways     Peripherally Inserted Central Catheter Line                 PICC Double Lumen 05/24/19 0900 right brachial 2 days          Drain                 Nephrostomy 04/21/19 0629 Left 8 Fr. 35 days         Gastrostomy/Enterostomy 05/02/19 1134 Percutaneous endoscopic gastrostomy (PEG) LUQ decompression;feeding 23 days         Urethral Catheter 05/16/19 1040 16 Fr. 10 days          Airway                 Surgical Airway 05/02/19 1107 Shiley Cuffed 24 days          Peripheral Intravenous Line                 Peripheral IV - Single Lumen 05/23/19 0651 22 G Posterior;Right Hand 3 days                Significant Labs:    CBC/Anemia Profile:  Recent Labs   Lab 05/24/19  1145 05/25/19  0500 05/26/19  0300   WBC 13.39* 11.27 11.28   HGB 8.0* 8.1* 8.1*   HCT 24.9* 25.0* 25.3*    228 235   MCV 90 90 90   RDW 14.8* 14.5 14.6*        Chemistries:  Recent Labs   Lab 05/24/19  1145 05/25/19  0500 05/26/19  0300    144 142   K 3.8 3.3* 3.5   * 110 111*   CO2 22* 24 23   BUN 28* 27* 28*   CREATININE 0.8 0.9 0.8   CALCIUM 9.0 9.1 8.9   ALBUMIN 1.5* 1.5* 1.5*   PROT 6.2 6.3 6.1   BILITOT 0.6 0.5 0.5   ALKPHOS 120 114 128   ALT 9* 10 10   AST 20 23 22   MG  --  1.7 2.2   PHOS  --  3.1 2.6*       All pertinent labs within the past 24 hours have been reviewed.    Significant Imaging:  I have reviewed all pertinent imaging results/findings within the past 24 hours.      Scheduled Meds:   ceFAZolin (ANCEF) IVPB  2 g Intravenous Q8H    chlorhexidine  15 mL Mouth/Throat BID    fluconazole  800 mg Per G Tube Daily    loperamide  2 mg Per G Tube QID    miconazole nitrate 2%   Topical (Top) BID    pantoprazole  40 mg Per G Tube Daily    rifAMpin (RIFADIN) IVPB  300 mg Intravenous  Q12H    scopolamine  1 patch Transdermal Q3 Days    silodosin  4 mg Per G Tube Daily     Continuous Infusions:  PRN Meds:sodium chloride, acetaminophen, albuterol-ipratropium, dextrose 50%, glucagon (human recombinant), hydrALAZINE, HYDROmorphone, hydrOXYzine, insulin aspart U-100, labetalol, magnesium sulfate IVPB, magnesium sulfate IVPB, ondansetron, oxyCODONE, oxyCODONE-acetaminophen, promethazine (PHENERGAN) IVPB, sodium chloride 0.9%    Vital signs in last 24 hours:  Temp:  [98.3 °F (36.8 °C)-100.3 °F (37.9 °C)] 98.3 °F (36.8 °C)  Pulse:  [63-80] 79  Resp:  [19-25] 19  SpO2:  [99 %-100 %] 99 %  BP: (116-187)/(55-85) 167/81    Intake/Output last 3 shifts:  I/O last 3 completed shifts:  In: 1902 [I.V.:372; NG/GT:530; IV Piggyback:1000]  Out: 4033 [Urine:4033]  Intake/Output this shift:  I/O this shift:  In: 40 [NG/GT:40]  Out: 55 [Urine:55]    Problem Assessment/Plan  Orthopedic  * Ureteral transection of left native kidney  Assessment & Plan  Mariann CROW Refugio is a 59 y.o. female s/p left ureteral injury on 4/12, who presented with fever, AMS, and intraabdominal abscess, taken for washout and ligation of left ureter with nephrostomy tube placement on 4/21. S/p Trach/PEG on 5/2. Episode of negative pressure pulmonary edema on 5/8 requiring mechanical ventilation, diuresis and low dose pressor. Has been plagued by intermittent BRBPR.     Neuro:   - off sedation  - responds to questions appropriately by nodding  - no focal deficit     Pulmonary:   - vent rate vs CPAP, does not like trach collar per nursing report, will try again today   - ABGs prn  - diuresis as tolerates     Cardiac:   - alternating HTN and hypotension  - TTE 5/8 with no evidence of right heart strain or significant valvular pathology, normal LV systolic function, EF 70%     Renal:    - S/p transection of left ureter 4/12 and subsequent ligation and PCT nephrostomy tube placement with IR 4/21  - Fontenot removed 5/15, but then replaced for retention    - UO adequate  - BUN/Cr stable  - Monitor I/Os        ID:   - AF  - EBC down  - Blood 5/10 NGTD  - Ucx 5/13 candiduria  - BAL 5/18 pseudomonas  - C diff 5/5 negative  - vanc stopped  - on fluconazole, cefepime, ancef      Hem/Onc:   - 5/24- 1U PRBC given  - stable now, continue to check CBC daily      Endocrine:  - DM Type 2  - TF @ goal 45  - LDSSI  - Endocrine following for assistance with blood glucose control.      Fluids/Electrolytes/Nutrition/GI:   - Free water flushes 300 cc q6h  - Replace electrolytes PRN   Rectal Ulcers  - intermittent hematochezia; ulcers seen to be improving on partial colonoscopy 5/21  - no bleeding in last 24 hours  - anoscopy and hemostatic agent applied by CRS 5/15  - imodium QID  - C scope by CRS 5/24 - no active bleed, healing rectal ulcer      PPx:  - DVT: Lovenox, Hep gtt held for continued bloody BMs        DISPO:  - Continue SICU care  - trach collar trials  - LTAC planning tomorrow                Critical secondary to Patient has a condition that poses threat to life and bodily function: Severe Respiratory Distress and rectal bleeding     Critical care was time spent personally by me on the following activities: development of treatment plan with patient or surrogate and bedside caregivers, discussions with consultants, evaluation of patient's response to treatment, examination of patient, ordering and performing treatments and interventions, ordering and review of laboratory studies, ordering and review of radiographic studies, pulse oximetry, re-evaluation of patient's condition.  This critical care time did not overlap with that of any other provider or involve time for any procedures.

## 2019-05-26 NOTE — SUBJECTIVE & OBJECTIVE
Interval History:   No acute events  Afebrile, WBC stable  On vent, got nervous and SOB with trach collar  H&H stable  C scope showed healing rectal ulcer, no bleeding     Review of Systems    Objective:     Temp:  [98.3 °F (36.8 °C)-100.3 °F (37.9 °C)] 98.3 °F (36.8 °C)  Pulse:  [63-80] 72  Resp:  [20-25] 22  SpO2:  [100 %] 100 %  BP: (116-187)/(55-85) 175/81     Body mass index is 28.25 kg/m².    Date 05/26/19 0700 - 05/27/19 0659   Shift 0710-7339 9254-2220 3664-9366 24 Hour Total   INTAKE   NG/GT 20   20   Shift Total(mL/kg) 20(0.3)   20(0.3)   OUTPUT   Urine(mL/kg/hr) 115   115   Shift Total(mL/kg) 115(1.5)   115(1.5)   Weight (kg) 77 77 77 77     Bladder Scan Volume (mL): 27 mL (05/10/19 0846)  Post Void Cath Residual Output (mL): 27 mL (05/10/19 0846)    Drains     Drain                 Nephrostomy 04/21/19 0629 Left 8 Fr. 32 days         Gastrostomy/Enterostomy 05/02/19 1134 Percutaneous endoscopic gastrostomy (PEG) LUQ decompression;feeding 20 days         Urethral Catheter 05/16/19 1040 16 Fr. 6 days                Physical Exam   Constitutional: She appears well-developed. No distress.   HENT:   Head: Normocephalic and atraumatic.   Neck: No JVD present.   Cardiovascular: Normal rate and regular rhythm.    Pulmonary/Chest: Effort normal. No respiratory distress.   On vent   Abdominal: Soft. She exhibits no distension. There is no rebound and no guarding.   Incision c/d/i   G-tube   Genitourinary:   Genitourinary Comments: Fontenot in place draining clear yellow urine  Left neph tube with clear yellow urine   Neurological: She is alert.   Skin: Skin is warm and dry. She is not diaphoretic. No pallor.         Significant Labs:    BMP:  Recent Labs   Lab 05/24/19  1145 05/25/19  0500 05/26/19  0300    144 142   K 3.8 3.3* 3.5   * 110 111*   CO2 22* 24 23   BUN 28* 27* 28*   CREATININE 0.8 0.9 0.8   CALCIUM 9.0 9.1 8.9       CBC:   Recent Labs   Lab 05/24/19  1145 05/25/19  0500 05/26/19  0300   WBC  13.39* 11.27 11.28   HGB 8.0* 8.1* 8.1*   HCT 24.9* 25.0* 25.3*    228 235     Significant Imaging:  All pertinent imaging results/findings from the past 24 hours have been reviewed.

## 2019-05-26 NOTE — PLAN OF CARE
Pt resting comfortably in bed, all VSS at this time  On SPONT today, pt tolerated well, SpO2 > 95%  MAP maintained > 60  SPB maintained < 180  TF currently at 20 cc/hr, unable to increase to goal of 45 due to N/V  Pt only able to tolerate 150 mL free water bolus Q6h    mL bolus admin today for decreased UOP  Nephrostomy output ~ 250 per shift  See flowsheet for additional assessment details  Plan to wean pt from vent with goal of tolerating trach collar  Plan of care reviewed with pt and spouse at bedside  Will continue to monitor

## 2019-05-26 NOTE — NURSING
MD notified of decreased UOP. Orders to admin  mL bolus; to be infused over 2 hours per Dr. Davison. Will implement and continue to monitor.

## 2019-05-26 NOTE — ASSESSMENT & PLAN NOTE
BG goal 140 - 180    Low Dose SQ Insulin Correction Scale PRN for BG excursions.  BG monitoring q 4 hrs.     ** Please notify Endocrine for any change and/or advance in diet/TF**  ** Please call Endocrine for any BG related issues **    Discharge Recommendations:     TBD.

## 2019-05-26 NOTE — PROGRESS NOTES
[facility]  Urology  Progress Note    Patient Name: Mariann Huff  MRN: [id number]  Admission Date: 4/20/2019  Hospital Length of Stay: 36 days  Code Status: Full Code   Attending Provider: Robin Boyd MD   Primary Care Physician: Jasbir Haney MD    Subjective:     HPI:  Mariann Huff is a 59 y.o. female with history of HTN, type 2 diabetes mellitus, CAD, NSTEMI, and back pain who presents to [facility] with altered mental status and sepsis. She underwent L5-S1 OLIF with NSGY on 4/12 and had intraoperative left ureteral injury with ureteroureteral anastomosis and ureteral stent placement. She did well initially and had correa and MADHURI drain removed on 4/16. She began having chills and altered mental status 2 days ago and this has progressively worsened. No complaints of pain.     She is altered and HPI is limited. In the ED she is febrile to 103 and tachycardic and pressures are low normal. WBC is 5, creatinine is 3.6 baseline 1.0, lactate is 4.6. Cath UA concerning for infection, 3+ LE, >100 WBCs, and many bacteria on microscopy.    CT and MRI abdomen both show air with minimal fluid near the surgical site with left ureter coursing through. There is air throughout the proximal collecting system which is decompressed with JJ ureteral stent in good position.    Taken for ex lap, ligation of left ureter and left neph tube placement on 4/21/19.    Interval History:   No acute events  Afebrile, WBC stable  On vent, got nervous and SOB with trach collar  H&H stable  C scope showed healing rectal ulcer, no bleeding     Review of Systems    Objective:     Temp:  [98.3 °F (36.8 °C)-100.3 °F (37.9 °C)] 98.3 °F (36.8 °C)  Pulse:  [63-80] 72  Resp:  [20-25] 22  SpO2:  [100 %] 100 %  BP: (116-187)/(55-85) 175/81     Body mass index is 28.25 kg/m².    Date 05/26/19 0700 - 05/27/19 0659   Shift 7043-8131 5743-5187 9722-3449 24 Hour Total   INTAKE   NG/GT 20   20   Shift Total(mL/kg) 20(0.3)   20(0.3)   OUTPUT    Urine(mL/kg/hr) 115   115   Shift Total(mL/kg) 115(1.5)   115(1.5)   Weight (kg) 77 77 77 77     Bladder Scan Volume (mL): 27 mL (05/10/19 0846)  Post Void Cath Residual Output (mL): 27 mL (05/10/19 0846)    Drains     Drain                 Nephrostomy 04/21/19 0629 Left 8 Fr. 32 days         Gastrostomy/Enterostomy 05/02/19 1134 Percutaneous endoscopic gastrostomy (PEG) LUQ decompression;feeding 20 days         Urethral Catheter 05/16/19 1040 16 Fr. 6 days                Physical Exam   Constitutional: She appears well-developed. No distress.   HENT:   Head: Normocephalic and atraumatic.   Neck: No JVD present.   Cardiovascular: Normal rate and regular rhythm.    Pulmonary/Chest: Effort normal. No respiratory distress.   On vent   Abdominal: Soft. She exhibits no distension. There is no rebound and no guarding.   Incision c/d/i   G-tube   Genitourinary:   Genitourinary Comments: Fontenot in place draining clear yellow urine  Left neph tube with clear yellow urine   Neurological: She is alert.   Skin: Skin is warm and dry. She is not diaphoretic. No pallor.         Significant Labs:    BMP:  Recent Labs   Lab 05/24/19  1145 05/25/19  0500 05/26/19  0300    144 142   K 3.8 3.3* 3.5   * 110 111*   CO2 22* 24 23   BUN 28* 27* 28*   CREATININE 0.8 0.9 0.8   CALCIUM 9.0 9.1 8.9       CBC:   Recent Labs   Lab 05/24/19  1145 05/25/19  0500 05/26/19  0300   WBC 13.39* 11.27 11.28   HGB 8.0* 8.1* 8.1*   HCT 24.9* 25.0* 25.3*    228 235     Significant Imaging:  All pertinent imaging results/findings from the past 24 hours have been reviewed.                  Assessment/Plan:     * Ureteral transection of left native kidney  Mariann Huff is a 59 y.o. female s/p left ureteral injury on 4/12, presented with fever, AMS, and intraabdominal abscess, taken for washout and ligation of left ureter with neph tube placement on 4/21, Trach/PEG 5/2.  - on vent; wean per SICU, possibly to trach collar today  - Tube  feeds per SICU  - ID on board, appreciate recs:   - continue rifampin; continue cefepime x 7 days, transition back to Ancef; continue rifampin and Ancef    until 6/18/19   - 5/13 UCx growing Candida albicans; 7 day course of diflucan 800 mg   - 5/19 BAL growing Pseudomonas   - ID recommends reimaging patient should she continue to show signs of infection  - Maintain neph tube and Fontenot  - Urine output adequate, Cr stable and WNL  - diuresis/fluids per SICU  - bloody BM   - flexible sigmoidoscopy on 5/21/19 showed no signs of bleeding, Colonoscopy showed healing rectal ulcer, EGD not indicated per GI.    Dispo: continue ICU care; plan for transfer to LTAC once stable. Holding heparin gtt currently    Sepsis  As above        VTE Risk Mitigation (From admission, onward)        Ordered     IP VTE LOW RISK PATIENT  Once      05/08/19 1315     Place sequential compression device  Until discontinued      04/20/19 1096          Hima Neumann MD  Urology  [facility]

## 2019-05-26 NOTE — SUBJECTIVE & OBJECTIVE
"Interval HPI:   Overnight events: Remains in SICU. NAEON. BG below goal ranges; not requiring SQ Novolog correction scale.  Eating:   NPO  Nausea: No  Hypoglycemia and intervention: No  Fever: No  TPN and/or TF: Yes  If yes, type of TF/TPN and rate: Glucerna 1.5 (goal 45 cc/hr) on hold now    BP (!) 153/74   Pulse 76   Temp 98.5 °F (36.9 °C) (Oral)   Resp (!) 24   Ht 5' 5" (1.651 m)   Wt 77 kg (169 lb 12.1 oz)   SpO2 100%   Breastfeeding? No   BMI 28.25 kg/m²     Labs Reviewed and Include    Recent Labs   Lab 05/26/19  0300   *   CALCIUM 8.9   ALBUMIN 1.5*   PROT 6.1      K 3.5   CO2 23   *   BUN 28*   CREATININE 0.8   ALKPHOS 128   ALT 10   AST 22   BILITOT 0.5     Lab Results   Component Value Date    WBC 11.28 05/26/2019    HGB 8.1 (L) 05/26/2019    HCT 25.3 (L) 05/26/2019    MCV 90 05/26/2019     05/26/2019     No results for input(s): TSH, FREET4 in the last 168 hours.  Lab Results   Component Value Date    HGBA1C 5.4 05/24/2019       Nutritional status:   Body mass index is 28.25 kg/m².  Lab Results   Component Value Date    ALBUMIN 1.5 (L) 05/26/2019    ALBUMIN 1.5 (L) 05/25/2019    ALBUMIN 1.5 (L) 05/24/2019     No results found for: PREALBUMIN    Estimated Creatinine Clearance: 78.7 mL/min (based on SCr of 0.8 mg/dL).    Accu-Checks  Recent Labs     05/23/19  1600 05/23/19  2012 05/24/19  0012 05/24/19  0408 05/24/19  0806 05/24/19  1145 05/25/19  0459 05/25/19  1002 05/25/19  1710 05/26/19  0749   POCTGLUCOSE 102 106 109 111* 107 93 106 86 87 116*       Current Medications and/or Treatments Impacting Glycemic Control  Immunotherapy:    Immunosuppressants     None        Steroids:   Hormones (From admission, onward)    None        Pressors:    Autonomic Drugs (From admission, onward)    None        Hyperglycemia/Diabetes Medications:   Antihyperglycemics (From admission, onward)    Start     Stop Route Frequency Ordered    05/24/19 1403  insulin aspart U-100 pen 0-5 Units   "    -- SubQ Every 6 hours PRN 05/24/19 173

## 2019-05-26 NOTE — PROGRESS NOTES
"[facility]  Endocrinology  Progress Note    Admit Date: 4/20/2019     Reason for Consult: Management of T2DM, Hyperglycemia     Surgical Procedure and Date:       04/21/2019:         1. Exploratory laparotomy        2. Ligation of left ureter        3. Removal of left JJ ureteral stent      Diabetes diagnosis year: ANDRE    Home Diabetes Medications:  ANDRE  Toujeo 50 BID  Invokana 300mg daily   Novolog 35 units AM, 45 units PM, 35 units PM    How often checking glucose at home? ANDRE   BG readings on regimen: ANDRE  Hypoglycemia on the regimen?  ANDRE  Missed doses on regimen?  ANDRE    Diabetes Complications include:     Hyperglycemia and Diabetic retinopathy     Complicating diabetes co morbidities:   History of MI and MURTAZA, CAD, HLD    HPI:   Patient is a 59 y.o. female with a diagnosis of HTN, HLN, DM type 2, nonproliferative diabetic renopathy, CAD, NSTEMI, and back pain who presents to the ED with a complaint of altered mental status on 04/20/2019. Is now s/p Exploratory laparotomy, Ligation of left ureter, and Removal of left JJ ureteral stent. Endocrinology consulted to manage DM2/Hyperglycemia.    Lab Results   Component Value Date    HGBA1C 10.8 (H) 02/19/2019       Interval HPI:   Overnight events: Remains in SICU. NAEON. BG below goal ranges; not requiring SQ Novolog correction scale.  Eating:   NPO  Nausea: No  Hypoglycemia and intervention: No  Fever: No  TPN and/or TF: Yes  If yes, type of TF/TPN and rate: Glucerna 1.5 (goal 45 cc/hr) on hold now    BP (!) 153/74   Pulse 76   Temp 98.5 °F (36.9 °C) (Oral)   Resp (!) 24   Ht 5' 5" (1.651 m)   Wt 77 kg (169 lb 12.1 oz)   SpO2 100%   Breastfeeding? No   BMI 28.25 kg/m²      Labs Reviewed and Include    Recent Labs   Lab 05/26/19  0300   *   CALCIUM 8.9   ALBUMIN 1.5*   PROT 6.1      K 3.5   CO2 23   *   BUN 28*   CREATININE 0.8   ALKPHOS 128   ALT 10   AST 22   BILITOT 0.5     Lab Results   Component Value Date    WBC " 11.28 05/26/2019    HGB 8.1 (L) 05/26/2019    HCT 25.3 (L) 05/26/2019    MCV 90 05/26/2019     05/26/2019     No results for input(s): TSH, FREET4 in the last 168 hours.  Lab Results   Component Value Date    HGBA1C 5.4 05/24/2019       Nutritional status:   Body mass index is 28.25 kg/m².  Lab Results   Component Value Date    ALBUMIN 1.5 (L) 05/26/2019    ALBUMIN 1.5 (L) 05/25/2019    ALBUMIN 1.5 (L) 05/24/2019     No results found for: PREALBUMIN    Estimated Creatinine Clearance: 78.7 mL/min (based on SCr of 0.8 mg/dL).    Accu-Checks  Recent Labs     05/23/19  1600 05/23/19  2012 05/24/19  0012 05/24/19  0408 05/24/19  0806 05/24/19  1145 05/25/19  0459 05/25/19  1002 05/25/19  1710 05/26/19  0749   POCTGLUCOSE 102 106 109 111* 107 93 106 86 87 116*       Current Medications and/or Treatments Impacting Glycemic Control  Immunotherapy:    Immunosuppressants     None        Steroids:   Hormones (From admission, onward)    None        Pressors:    Autonomic Drugs (From admission, onward)    None        Hyperglycemia/Diabetes Medications:   Antihyperglycemics (From admission, onward)    Start     Stop Route Frequency Ordered    05/24/19 1835  insulin aspart U-100 pen 0-5 Units      -- SubQ Every 6 hours PRN 05/24/19 1735          ASSESSMENT and PLAN    * Ureteral transection of left native kidney  Managed per primary team  Avoid hypoglycemia        Type 2 diabetes mellitus with diabetic peripheral angiopathy without gangrene  BG goal 140 - 180    Low Dose SQ Insulin Correction Scale PRN for BG excursions.  BG monitoring q 4 hrs.     ** Please notify Endocrine for any change and/or advance in diet/TF**  ** Please call Endocrine for any BG related issues **    Discharge Recommendations:     TBD.             CAD (coronary artery disease)  Managed per primary team  Condition may cause insulin resistance       Sleep apnea  May affect BG readings if uncontrolled        PAPA (acute kidney injury)  Caution with  insulin stacking        HLD (hyperlipidemia)  Managed per primary team  Condition may cause insulin resistance         On enteral nutrition  May cause BG excursions.           Jolanta Morales NP  Endocrinology  [facility]

## 2019-05-27 PROBLEM — Z51.5 PALLIATIVE CARE ENCOUNTER: Status: ACTIVE | Noted: 2019-05-27

## 2019-05-27 LAB
ALBUMIN SERPL BCP-MCNC: 1.5 G/DL (ref 3.5–5.2)
ALP SERPL-CCNC: 118 U/L (ref 55–135)
ALT SERPL W/O P-5'-P-CCNC: 9 U/L (ref 10–44)
ANION GAP SERPL CALC-SCNC: 10 MMOL/L (ref 8–16)
AST SERPL-CCNC: 22 U/L (ref 10–40)
BASOPHILS # BLD AUTO: 0.05 K/UL (ref 0–0.2)
BASOPHILS NFR BLD: 0.4 % (ref 0–1.9)
BILIRUB SERPL-MCNC: 0.7 MG/DL (ref 0.1–1)
BUN SERPL-MCNC: 25 MG/DL (ref 6–20)
CALCIUM SERPL-MCNC: 8.7 MG/DL (ref 8.7–10.5)
CHLORIDE SERPL-SCNC: 111 MMOL/L (ref 95–110)
CO2 SERPL-SCNC: 22 MMOL/L (ref 23–29)
CREAT SERPL-MCNC: 1 MG/DL (ref 0.5–1.4)
DIFFERENTIAL METHOD: ABNORMAL
EOSINOPHIL # BLD AUTO: 1.2 K/UL (ref 0–0.5)
EOSINOPHIL NFR BLD: 10.7 % (ref 0–8)
ERYTHROCYTE [DISTWIDTH] IN BLOOD BY AUTOMATED COUNT: 14.5 % (ref 11.5–14.5)
EST. GFR  (AFRICAN AMERICAN): >60 ML/MIN/1.73 M^2
EST. GFR  (NON AFRICAN AMERICAN): >60 ML/MIN/1.73 M^2
GLUCOSE SERPL-MCNC: 116 MG/DL (ref 70–110)
HCT VFR BLD AUTO: 26.1 % (ref 37–48.5)
HGB BLD-MCNC: 8 G/DL (ref 12–16)
IMM GRANULOCYTES # BLD AUTO: 0.09 K/UL (ref 0–0.04)
IMM GRANULOCYTES NFR BLD AUTO: 0.8 % (ref 0–0.5)
INR PPP: 1.1 (ref 0.8–1.2)
LYMPHOCYTES # BLD AUTO: 1.3 K/UL (ref 1–4.8)
LYMPHOCYTES NFR BLD: 11 % (ref 18–48)
MAGNESIUM SERPL-MCNC: 2.2 MG/DL (ref 1.6–2.6)
MCH RBC QN AUTO: 28.4 PG (ref 27–31)
MCHC RBC AUTO-ENTMCNC: 30.7 G/DL (ref 32–36)
MCV RBC AUTO: 93 FL (ref 82–98)
MONOCYTES # BLD AUTO: 0.8 K/UL (ref 0.3–1)
MONOCYTES NFR BLD: 7 % (ref 4–15)
NEUTROPHILS # BLD AUTO: 8.1 K/UL (ref 1.8–7.7)
NEUTROPHILS NFR BLD: 70.1 % (ref 38–73)
NRBC BLD-RTO: 0 /100 WBC
PHOSPHATE SERPL-MCNC: 3.1 MG/DL (ref 2.7–4.5)
PLATELET # BLD AUTO: 227 K/UL (ref 150–350)
PMV BLD AUTO: 11.3 FL (ref 9.2–12.9)
POCT GLUCOSE: 116 MG/DL (ref 70–110)
POCT GLUCOSE: 120 MG/DL (ref 70–110)
POCT GLUCOSE: 127 MG/DL (ref 70–110)
POCT GLUCOSE: 134 MG/DL (ref 70–110)
POCT GLUCOSE: 141 MG/DL (ref 70–110)
POCT GLUCOSE: 142 MG/DL (ref 70–110)
POTASSIUM SERPL-SCNC: 4.1 MMOL/L (ref 3.5–5.1)
PROT SERPL-MCNC: 6.2 G/DL (ref 6–8.4)
PROTHROMBIN TIME: 11.2 SEC (ref 9–12.5)
RBC # BLD AUTO: 2.82 M/UL (ref 4–5.4)
SODIUM SERPL-SCNC: 143 MMOL/L (ref 136–145)
WBC # BLD AUTO: 11.53 K/UL (ref 3.9–12.7)

## 2019-05-27 PROCEDURE — 99232 PR SUBSEQUENT HOSPITAL CARE,LEVL II: ICD-10-PCS | Mod: ,,, | Performed by: NURSE PRACTITIONER

## 2019-05-27 PROCEDURE — 94761 N-INVAS EAR/PLS OXIMETRY MLT: CPT

## 2019-05-27 PROCEDURE — 99233 SBSQ HOSP IP/OBS HIGH 50: CPT | Mod: ,,, | Performed by: CLINICAL NURSE SPECIALIST

## 2019-05-27 PROCEDURE — 99900035 HC TECH TIME PER 15 MIN (STAT)

## 2019-05-27 PROCEDURE — 97535 SELF CARE MNGMENT TRAINING: CPT

## 2019-05-27 PROCEDURE — 25000003 PHARM REV CODE 250: Performed by: STUDENT IN AN ORGANIZED HEALTH CARE EDUCATION/TRAINING PROGRAM

## 2019-05-27 PROCEDURE — 27201112

## 2019-05-27 PROCEDURE — 25000003 PHARM REV CODE 250: Performed by: PSYCHOLOGIST

## 2019-05-27 PROCEDURE — 31720 CLEARANCE OF AIRWAYS: CPT

## 2019-05-27 PROCEDURE — 99233 SBSQ HOSP IP/OBS HIGH 50: CPT | Mod: ,,, | Performed by: SURGERY

## 2019-05-27 PROCEDURE — 83735 ASSAY OF MAGNESIUM: CPT

## 2019-05-27 PROCEDURE — 63600175 PHARM REV CODE 636 W HCPCS: Performed by: STUDENT IN AN ORGANIZED HEALTH CARE EDUCATION/TRAINING PROGRAM

## 2019-05-27 PROCEDURE — 80053 COMPREHEN METABOLIC PANEL: CPT

## 2019-05-27 PROCEDURE — 99233 PR SUBSEQUENT HOSPITAL CARE,LEVL III: ICD-10-PCS | Mod: ,,, | Performed by: SURGERY

## 2019-05-27 PROCEDURE — 27200966 HC CLOSED SUCTION SYSTEM

## 2019-05-27 PROCEDURE — 99233 PR SUBSEQUENT HOSPITAL CARE,LEVL III: ICD-10-PCS | Mod: ,,, | Performed by: CLINICAL NURSE SPECIALIST

## 2019-05-27 PROCEDURE — 84100 ASSAY OF PHOSPHORUS: CPT

## 2019-05-27 PROCEDURE — 20000000 HC ICU ROOM

## 2019-05-27 PROCEDURE — 85610 PROTHROMBIN TIME: CPT

## 2019-05-27 PROCEDURE — 27000221 HC OXYGEN, UP TO 24 HOURS

## 2019-05-27 PROCEDURE — 99233 SBSQ HOSP IP/OBS HIGH 50: CPT | Mod: GC,,, | Performed by: SURGERY

## 2019-05-27 PROCEDURE — 97110 THERAPEUTIC EXERCISES: CPT

## 2019-05-27 PROCEDURE — 99233 PR SUBSEQUENT HOSPITAL CARE,LEVL III: ICD-10-PCS | Mod: GC,,, | Performed by: SURGERY

## 2019-05-27 PROCEDURE — 85025 COMPLETE CBC W/AUTO DIFF WBC: CPT

## 2019-05-27 PROCEDURE — 99900026 HC AIRWAY MAINTENANCE (STAT)

## 2019-05-27 PROCEDURE — 99232 SBSQ HOSP IP/OBS MODERATE 35: CPT | Mod: ,,, | Performed by: NURSE PRACTITIONER

## 2019-05-27 RX ADMIN — OXYCODONE HYDROCHLORIDE AND ACETAMINOPHEN 1 TABLET: 10; 325 TABLET ORAL at 01:05

## 2019-05-27 RX ADMIN — PANTOPRAZOLE SODIUM 40 MG: 40 GRANULE, DELAYED RELEASE ORAL at 08:05

## 2019-05-27 RX ADMIN — CHLORHEXIDINE GLUCONATE 0.12% ORAL RINSE 15 ML: 1.2 LIQUID ORAL at 08:05

## 2019-05-27 RX ADMIN — RIFAMPIN 300 MG: 600 INJECTION, POWDER, LYOPHILIZED, FOR SOLUTION INTRAVENOUS at 03:05

## 2019-05-27 RX ADMIN — CEFAZOLIN 2 G: 1 INJECTION, POWDER, FOR SOLUTION INTRAMUSCULAR; INTRAVENOUS at 03:05

## 2019-05-27 RX ADMIN — MICONAZOLE NITRATE: 20 OINTMENT TOPICAL at 09:05

## 2019-05-27 RX ADMIN — MICONAZOLE NITRATE: 20 OINTMENT TOPICAL at 08:05

## 2019-05-27 RX ADMIN — OXYCODONE HYDROCHLORIDE 5 MG: 5 TABLET ORAL at 07:05

## 2019-05-27 RX ADMIN — SILODOSIN 4 MG: 4 CAPSULE ORAL at 08:05

## 2019-05-27 RX ADMIN — OXYCODONE HYDROCHLORIDE AND ACETAMINOPHEN 1 TABLET: 10; 325 TABLET ORAL at 09:05

## 2019-05-27 RX ADMIN — Medication 2 MG: at 09:05

## 2019-05-27 RX ADMIN — FLUCONAZOLE 800 MG: 200 TABLET ORAL at 08:05

## 2019-05-27 RX ADMIN — CEFAZOLIN 2 G: 1 INJECTION, POWDER, FOR SOLUTION INTRAMUSCULAR; INTRAVENOUS at 09:05

## 2019-05-27 RX ADMIN — CEFAZOLIN 2 G: 1 INJECTION, POWDER, FOR SOLUTION INTRAMUSCULAR; INTRAVENOUS at 06:05

## 2019-05-27 RX ADMIN — CHLORHEXIDINE GLUCONATE 0.12% ORAL RINSE 15 ML: 1.2 LIQUID ORAL at 09:05

## 2019-05-27 NOTE — PROGRESS NOTES
[facility]  Colorectal Surgery  Progress Note    Patient Name: Mariann Huff  MRN: [id number]  Admission Date: 4/20/2019  Hospital Length of Stay: 37 days  Attending Physician: Robin Boyd MD    Subjective:     Interval History: no bleeding overnight    Post-Op Info:  Procedure(s) (LRB):  COLONOSCOPY (N/A)   3 Days Post-Op      Medications:  Continuous Infusions:  Scheduled Meds:   ceFAZolin (ANCEF) IVPB  2 g Intravenous Q8H    chlorhexidine  15 mL Mouth/Throat BID    fluconazole  800 mg Per G Tube Daily    loperamide  2 mg Per G Tube QID    miconazole nitrate 2%   Topical (Top) BID    pantoprazole  40 mg Per G Tube Daily    rifAMpin (RIFADIN) IVPB  300 mg Intravenous Q12H    scopolamine  1 patch Transdermal Q3 Days    silodosin  4 mg Per G Tube Daily     PRN Meds:   sodium chloride    acetaminophen    albuterol-ipratropium    dextrose 50%    glucagon (human recombinant)    hydrALAZINE    HYDROmorphone    hydrOXYzine    insulin aspart U-100    labetalol    magnesium sulfate IVPB    magnesium sulfate IVPB    ondansetron    oxyCODONE    oxyCODONE-acetaminophen    promethazine (PHENERGAN) IVPB    sodium chloride 0.9%        Objective:     Vital Signs (Most Recent):  Temp: 98.1 °F (36.7 °C) (05/27/19 0702)  Pulse: 85 (05/27/19 1000)  Resp: (!) 28 (05/27/19 1000)  BP: (!) 161/72 (05/27/19 1000)  SpO2: 100 % (05/27/19 1000) Vital Signs (24h Range):  Temp:  [98.1 °F (36.7 °C)-99.3 °F (37.4 °C)] 98.1 °F (36.7 °C)  Pulse:  [68-93] 85  Resp:  [5-38] 28  SpO2:  [82 %-100 %] 100 %  BP: (112-174)/(55-85) 161/72     Intake/Output - Last 3 Shifts       05/25 0700 - 05/26 0659 05/26 0700 - 05/27 0659 05/27 0700 - 05/28 0659    I.V. (mL/kg) 312 (4.1) 373 (4.8)     Blood   0    NG/ 1360 80    IV Piggyback 1000 500     Total Intake(mL/kg) 1702 (22.1) 2233 (29) 80 (1)    Urine (mL/kg/hr) 1203 (0.7) 1755 (0.9) 400 (1.5)    Stool       Total Output 1203 1755 400    Net +499 +478 -320                  Physical Exam   Constitutional: No distress.   Neck: Neck supple.   Cardiovascular: Normal rate and regular rhythm.   Pulmonary/Chest: Effort normal. No respiratory distress.   Abdominal: Soft. She exhibits no distension. There is no tenderness.   Neurological: She is alert.   Skin: Skin is warm and dry.       Significant Labs:  BMP (Last 3 Results):   Recent Labs   Lab 05/25/19  0500 05/26/19  0300 05/26/19  1400 05/27/19  0330   GLU 89 115* 110 116*    142 141 143   K 3.3* 3.5 4.4 4.1    111* 112* 111*   CO2 24 23 22* 22*   BUN 27* 28* 25* 25*   CREATININE 0.9 0.8 0.8 1.0   CALCIUM 9.1 8.9 8.5* 8.7   MG 1.7 2.2  --  2.2     CBC (Last 3 Results):   Recent Labs   Lab 05/25/19  0500 05/26/19  0300 05/27/19  0330   WBC 11.27 11.28 11.53   RBC 2.79* 2.82* 2.82*   HGB 8.1* 8.1* 8.0*   HCT 25.0* 25.3* 26.1*    235 227   MCV 90 90 93   MCH 29.0 28.7 28.4   MCHC 32.4 32.0 30.7*       Significant Diagnostics:  I have reviewed all pertinent imaging results/findings within the past 24 hours.    Assessment/Plan:     BRBPR (bright red blood per rectum)  Ms. Huff is a 58 yo F with PMH of HTN, DM II, CAD, NSTEMI, s/p L5-S1 OLIF with NSGY 4/12 c/b intraoperative left ureteral injury and underwent ureteroureteral anastomoses and ureteral stent placement complicated by sepsis due to E.coli septicemia now s/p ex lap on 4/21 and PEG/Trach of the vent now having BRBPR, s/p C scope showing only rectal ulcer.     S/p flex sig on 5/13, 5/21  S/p C scope 5/24 - rectal ulcer no other source of bleeding.   No more bloody BM.  Transfuse as needed, cont to trend H/H  Will sign off, please call with any questions or concerns               John Barker MD  Colorectal Surgery  [facility]

## 2019-05-27 NOTE — PLAN OF CARE
05/27/19 1217   Discharge Assessment   Assessment Type Discharge Planning Reassessment   Discharge Plan A Long-term acute care facility (LTAC)   Discharge Plan B Rehab   Patient/Family in Agreement with Plan yes     Pt remains in ICU. Insurance auth [facility] Pending approval for transfer. [employer] offices closed today for holiday. Will follow up tomorrow for auth. Possible transfer to LTAC tomorrow vs Wednesday pending medical clearance per Urology and SICU. Auth once obtain is good for 7 days.

## 2019-05-27 NOTE — SUBJECTIVE & OBJECTIVE
Interval History:     Medications:  Continuous Infusions:  Scheduled Meds:   ceFAZolin (ANCEF) IVPB  2 g Intravenous Q8H    chlorhexidine  15 mL Mouth/Throat BID    fluconazole  800 mg Per G Tube Daily    loperamide  2 mg Per G Tube QID    miconazole nitrate 2%   Topical (Top) BID    pantoprazole  40 mg Per G Tube Daily    rifAMpin (RIFADIN) IVPB  300 mg Intravenous Q12H    scopolamine  1 patch Transdermal Q3 Days    silodosin  4 mg Per G Tube Daily     PRN Meds:sodium chloride, acetaminophen, albuterol-ipratropium, dextrose 50%, glucagon (human recombinant), hydrALAZINE, HYDROmorphone, hydrOXYzine, insulin aspart U-100, labetalol, magnesium sulfate IVPB, magnesium sulfate IVPB, ondansetron, oxyCODONE, oxyCODONE-acetaminophen, promethazine (PHENERGAN) IVPB, sodium chloride 0.9%    Objective:     Vital Signs (Most Recent):  Temp: 98.1 °F (36.7 °C) (05/27/19 0702)  Pulse: 82 (05/27/19 0900)  Resp: 15 (05/27/19 0900)  BP: (!) 142/67 (05/27/19 0900)  SpO2: 100 % (05/27/19 0900) Vital Signs (24h Range):  Temp:  [98.1 °F (36.7 °C)-99.3 °F (37.4 °C)] 98.1 °F (36.7 °C)  Pulse:  [68-93] 82  Resp:  [5-38] 15  SpO2:  [82 %-100 %] 100 %  BP: (112-174)/(55-85) 142/67     Weight: 77 kg (169 lb 12.1 oz)  Body mass index is 28.25 kg/m².    Review of Symptoms  Symptom Assessment (ESAS 0-10 scale)  ESAS 0 1 2 3 4 5 6 7 8 9 10   Pain    X          Dyspnea X             Anxiety X             Nausea X             Depression  X             Anorexia X             Fatigue X             Insomnia X             Restlessness  X             Agitation X             CAM / Delirium __ --  ___+   Constipation     __ --  ___+   Diarrhea           __ --  ___+  Bowel Management Plan (BMP): No    Comments: mild  post surgical pain, tolerating oxygen by trach collar.  No shortness of breath noted no dyspnea reported.      Pain Assessment:  mild  post surgical pain, 5/10, more noticeable at bedtime, relieved with oxycodone and acetaminophen.      OME in 24 hours: 30     Performance Status: 60    ECOG Performance Status Grade: 2 - Ambulates, capable of self care only    Physical Exam   Constitutional: She is oriented to person, place, and time. She appears well-developed. No distress.   Cardiovascular: Normal rate, regular rhythm and normal heart sounds.   Pulmonary/Chest: No respiratory distress.   Trach, oxygen by trach collar    Abdominal: Soft. Bowel sounds are normal.   Genitourinary:   Genitourinary Comments: Nephrostomy tube with clear yellow urine    Neurological: She is alert and oriented to person, place, and time.   Skin: Skin is warm and dry.   Psychiatric: She has a normal mood and affect. Her behavior is normal. Judgment and thought content normal.   Nursing note and vitals reviewed.      Significant Labs: All pertinent labs within the past 24 hours have been reviewed.  CBC:   Recent Labs   Lab 05/27/19  0330   WBC 11.53   HGB 8.0*   HCT 26.1*   MCV 93        BMP:  Recent Labs   Lab 05/27/19  0330   *      K 4.1   *   CO2 22*   BUN 25*   CREATININE 1.0   CALCIUM 8.7   MG 2.2     LFT:  Lab Results   Component Value Date    AST 22 05/27/2019    ALKPHOS 118 05/27/2019    BILITOT 0.7 05/27/2019     Albumin:   Albumin   Date Value Ref Range Status   05/27/2019 1.5 (L) 3.5 - 5.2 g/dL Final     Protein:   Total Protein   Date Value Ref Range Status   05/27/2019 6.2 6.0 - 8.4 g/dL Final     Lactic acid:   Lab Results   Component Value Date    LACTATE 0.8 05/13/2019    LACTATE 0.7 05/10/2019       Significant Imaging: I have reviewed all pertinent imaging results/findings within the past 24 hours.    Advanced Directives::  Living Will: No  LaPOST: No  Do Not Resuscitate Status: No  Medical Power of : No    Decision-Making Capacity: Patient answered questions    Living Arrangements: Lives with spouse    Psychosocial/Cultural:  40 years, 2 adult children, 3 grandchildren.          Spiritual:     F-  Mariella and Belief: Malvin     I - Importance:   .  C - Community:     A - Address in Care: amenable to  visits

## 2019-05-27 NOTE — ASSESSMENT & PLAN NOTE
Ms. Huff is a 58 yo F with PMH of HTN, DM II, CAD, NSTEMI, s/p L5-S1 OLIF with NSGY 4/12 c/b intraoperative left ureteral injury and underwent ureteroureteral anastomoses and ureteral stent placement complicated by sepsis due to E.coli septicemia now s/p ex lap on 4/21 and PEG/Trach of the vent now having BRBPR, s/p C scope showing only rectal ulcer.     S/p flex sig on 5/13, 5/21  S/p C scope 5/24 - rectal ulcer no other source of bleeding.   No more bloody BM.  Transfuse as needed, cont to trend H/H  Will sign off, please call with any questions or concerns

## 2019-05-27 NOTE — SUBJECTIVE & OBJECTIVE
Interval History/Significant Events: No acute events overnight. On CPAP and tolerating it well. Will try trach collar again today.       Objective:     Vital Signs (Most Recent):  Temp: 98.1 °F (36.7 °C) (05/27/19 0702)  Pulse: 85 (05/27/19 1000)  Resp: (!) 28 (05/27/19 1000)  BP: (!) 161/72 (05/27/19 1000)  SpO2: 100 % (05/27/19 1000) Vital Signs (24h Range):  Temp:  [98.1 °F (36.7 °C)-99.3 °F (37.4 °C)] 98.1 °F (36.7 °C)  Pulse:  [68-93] 85  Resp:  [5-38] 28  SpO2:  [82 %-100 %] 100 %  BP: (112-174)/(55-85) 161/72     Weight: 77 kg (169 lb 12.1 oz)  Body mass index is 28.25 kg/m².      Intake/Output Summary (Last 24 hours) at 5/27/2019 1033  Last data filed at 5/27/2019 1000  Gross per 24 hour   Intake 2253 ml   Output 2010 ml   Net 243 ml       Physical Exam   Constitutional: She appears well-developed and well-nourished.   HENT:   Head: Normocephalic and atraumatic.   Tracheostomy in place   Eyes: Right eye exhibits no discharge. Left eye exhibits no discharge.   Neck: Normal range of motion. Neck supple.   Cardiovascular: Normal rate and regular rhythm.   Pulmonary/Chest: Effort normal. No respiratory distress.   Abdominal: Soft.   Midline incision c/d/i.  PEG with no leak. Abdomen soft, non-distended.  Bowel sounds present   Genitourinary:   Genitourinary Comments: Nephrostomy tube in place with yellow output.  Fontenot in place  No rectal bleeding since noon 5/23   Musculoskeletal: Normal range of motion.   Neurological: She is alert.   Able to follow commands, denying pain.    Skin: Skin is warm and dry.   Psychiatric: She has a normal mood and affect.   Nursing note and vitals reviewed.      Vents:  Vent Mode: Spont (05/27/19 0830)  Ventilator Initiated: Yes (05/08/19 0630)  Set Rate: 18 bmp (05/27/19 0305)  Vt Set: 420 mL (05/27/19 0305)  Pressure Support: 15 cmH20 (05/27/19 0830)  PEEP/CPAP: 5 cmH20 (05/27/19 0830)  Oxygen Concentration (%): 40 (05/27/19 1000)  Peak Airway Pressure: 22 cmH2O (05/27/19  0830)  Plateau Pressure: 18 cmH20 (05/27/19 0830)  Total Ve: 5.05 mL (05/27/19 0830)  Negative Inspiratory Force (cm H2O): -18 (04/27/19 1306)  F/VT Ratio<105 (RSBI): (!) 38.37 (05/27/19 0830)    Lines/Drains/Airways     Peripherally Inserted Central Catheter Line                 PICC Double Lumen 05/24/19 0900 right brachial 3 days          Drain                 Nephrostomy 04/21/19 0629 Left 8 Fr. 36 days         Gastrostomy/Enterostomy 05/02/19 1134 Percutaneous endoscopic gastrostomy (PEG) LUQ decompression;feeding 24 days         Urethral Catheter 05/16/19 1040 16 Fr. 10 days          Airway                 Surgical Airway 05/02/19 1107 Shiley Cuffed 24 days                Significant Labs:    CBC/Anemia Profile:  Recent Labs   Lab 05/26/19  0300 05/27/19  0330   WBC 11.28 11.53   HGB 8.1* 8.0*   HCT 25.3* 26.1*    227   MCV 90 93   RDW 14.6* 14.5        Chemistries:  Recent Labs   Lab 05/26/19  0300 05/26/19  1400 05/27/19  0330    141 143   K 3.5 4.4 4.1   * 112* 111*   CO2 23 22* 22*   BUN 28* 25* 25*   CREATININE 0.8 0.8 1.0   CALCIUM 8.9 8.5* 8.7   ALBUMIN 1.5* 1.6* 1.5*   PROT 6.1 6.1 6.2   BILITOT 0.5 0.5 0.7   ALKPHOS 128 120 118   ALT 10 9* 9*   AST 22 22 22   MG 2.2  --  2.2   PHOS 2.6*  --  3.1       All pertinent labs within the past 24 hours have been reviewed.    Significant Imaging:  I have reviewed all pertinent imaging results/findings within the past 24 hours.

## 2019-05-27 NOTE — SUBJECTIVE & OBJECTIVE
"Interval HPI:   Overnight events: Remains in SICU. NAEON. BG within goal ranges; not requiring SQ Novolog correction scale.  Eating:   NPO  Nausea: Yes  Hypoglycemia and intervention: No  Fever: No  TPN and/or TF: Yes  If yes, type of TF/TPN and rate: Glucerna 1.5 at 20 cc/hr (goal 45)     /60 (BP Location: Left arm, Patient Position: Lying)   Pulse 73   Temp 98.1 °F (36.7 °C) (Oral)   Resp 17   Ht 5' 5" (1.651 m)   Wt 77 kg (169 lb 12.1 oz)   SpO2 100%   Breastfeeding? No   BMI 28.25 kg/m²     Labs Reviewed and Include    Recent Labs   Lab 05/27/19  0330   *   CALCIUM 8.7   ALBUMIN 1.5*   PROT 6.2      K 4.1   CO2 22*   *   BUN 25*   CREATININE 1.0   ALKPHOS 118   ALT 9*   AST 22   BILITOT 0.7     Lab Results   Component Value Date    WBC 11.53 05/27/2019    HGB 8.0 (L) 05/27/2019    HCT 26.1 (L) 05/27/2019    MCV 93 05/27/2019     05/27/2019     No results for input(s): TSH, FREET4 in the last 168 hours.  Lab Results   Component Value Date    HGBA1C 5.4 05/24/2019       Nutritional status:   Body mass index is 28.25 kg/m².  Lab Results   Component Value Date    ALBUMIN 1.5 (L) 05/27/2019    ALBUMIN 1.6 (L) 05/26/2019    ALBUMIN 1.5 (L) 05/26/2019     No results found for: PREALBUMIN    Estimated Creatinine Clearance: 62.9 mL/min (based on SCr of 1 mg/dL).    Accu-Checks  Recent Labs     05/24/19  1145 05/25/19  0459 05/25/19  1002 05/25/19  1710 05/26/19  0749 05/26/19  1215 05/26/19  1607 05/27/19  0329   POCTGLUCOSE 93 106 86 87 116* 109 116* 142*       Current Medications and/or Treatments Impacting Glycemic Control  Immunotherapy:    Immunosuppressants     None        Steroids:   Hormones (From admission, onward)    None        Pressors:    Autonomic Drugs (From admission, onward)    None        Hyperglycemia/Diabetes Medications:   Antihyperglycemics (From admission, onward)    Start     Stop Route Frequency Ordered    05/24/19 0921  insulin aspart U-100 pen 0-5 Units   "    -- SubQ Every 6 hours PRN 05/24/19 1734

## 2019-05-27 NOTE — PLAN OF CARE
05/27/19 1604   Post-Acute Status   Post-Acute Authorization Placement   Post-Acute Placement Status Pending Payor Review     [employer] is closed today for the holiday.  NICK will f/u with [facility] tomorrow.    Ирина Godinez, [phone]

## 2019-05-27 NOTE — ASSESSMENT & PLAN NOTE
Impression Mrs. Huff is out of bed to chair working with physical therapy.   States no pain or discomforts at this time. Receiving oxygen by trach collar, no shortness of breath noted, no dyspnea reported.  PEG intact tolerating tube feeding.  nephrostomy tubes with clear yellow urine.       Symptom Management: Pain   - post surgical pain and pain associated with GI bleed   - 24 hr oral morphine equivalent - 30   - Mrs. Huff states no pain at this time.  Able to get out of bed to chair and work with PT.   - Encouraged patient to ask for pain medications before PT as needed.     - - At this time indicates pian in 4-5/10 and when offered states no need for pain medication  - Palliative medicine APRN discussed current pain medication strategies and encouraged her to ask for pain before pain was severe.   - Recommendations:    Continue Acetaminophen 650 mg via PEG  tube every 6 hrs as needed for mild to moderate pain and   Discontinue Oxycodone acetaminophen 10/325 mg every 4 hrs as needed for severe pain and   Discontinue Oxycodone 5 mg via PEG  tube every 6hrs as needed for moderate pain and   Change to Oxycodone 5 mg via PEG   tube every 4 hrs as needed  for moderate pain (4-6/10)  and   Oxycodone 10 mg via PEG  tube every 4 hrs as needed for severe pain (7-10/10) and   Change Hydromorphone to 1 mg IVP every 4 hrs as needed for severe pain not relieved with Oxycodone 10 mg every 4 hrs as needed for severe pain.     Bowel Management   - Loperamide 2 mg via peg tube four times daily   - Last documented bowel movement 5/24/19  - Patient is receiving opiate pain medications      Advanced Care Planning   - no advanced directives received  -Advanced care planning education   -  is next of kin for medical decision making   - Full code per primary team.  Patient and family in agreement with this plan  - Patient and family state understanding of current clinical condtion      Goals of Care:   As per patient and  medical record the plan is for transition to LTAC  - Ms. Refugio states being hopeful she will continue to recover

## 2019-05-27 NOTE — PLAN OF CARE
Problem: Occupational Therapy Goal  Goal: Occupational Therapy Goal  Goals to be met by: 6/5/19     Patient will increase functional independence with ADLs by performing:    UE Dressing with Set-up Assistance.  LE Dressing with Maximum Assistance and Assistive Devices as needed.  Grooming while standing with Minimal Assistance.  Toileting from bedside commode with Minimal Assistance for hygiene and clothing management.   Sitting at edge of bed x10 minutes with Supervision.  Rolling to Bilateral with Minimal Assistance.   Supine to sit with Minimal Assistance.  Toilet transfer to bedside commode with Minimal Assistance.      Cont. POC.

## 2019-05-27 NOTE — PT/OT/SLP PROGRESS
Occupational Therapy   Treatment    Name: Mariann Huff  MRN: [id number]  Admitting Diagnosis:  Ureteral transection of left native kidney  3 Days Post-Op    Recommendations:     Discharge Recommendations: rehabilitation facility  Discharge Equipment Recommendations:  (TBD)  Barriers to discharge:  Decreased caregiver support    Assessment:     Mariann Huff is a 59 y.o. female with a medical diagnosis of Ureteral transection of left native kidney.  She was able to perform supine/sit, sit/stand, and bed/chair T/F c max A.  Donned LSO c total assist.  Tolerated B UE exercises well while sitting UIC.  Performance deficits affecting function are weakness, impaired endurance, impaired self care skills, impaired functional mobilty, decreased upper extremity function, decreased ROM.     Rehab Prognosis:  Good; patient would benefit from acute skilled OT services to address these deficits and reach maximum level of function.       Plan:     Patient to be seen 4 x/week to address the above listed problems via self-care/home management, therapeutic activities, therapeutic exercises  · Plan of Care Expires: 06/05/19  · Plan of Care Reviewed with: patient, spouse    Subjective     Pain/Comfort:  · Pain Rating 1: 0/10    Objective:     Communicated with: RN prior to session.  Patient found supine with blood pressure cuff, correa catheter, oxygen, peripheral IV, pulse ox (continuous), telemetry, tracheostomy, PEG Tube upon OT entry to room.    General Precautions: Standard, fall   Orthopedic Precautions:N/A   Braces: LSO     Occupational Performance:     Bed Mobility:    · Patient completed Rolling/Turning to Right with maximal assistance  · Patient completed Supine to Sit with maximal assistance     Functional Mobility/Transfers:  · Patient completed Sit <> Stand Transfer with total assistance  with  no assistive device   · Patient completed Bed <> Chair Transfer using Stand Pivot technique with total assistance with no  assistive device      Activities of Daily Living:  · Upper Body Dressing: total assistance to don LSO.      AMPAC 6 Click ADL: 9    Treatment & Education:  Pt was able to perform B UE AROM exercises 1x15 in all planes and tolerated well while sitting UIC.    Patient left up in chair with all lines intact, call button in reach, RN notified and family presentEducation:      GOALS:   Multidisciplinary Problems     Occupational Therapy Goals        Problem: Occupational Therapy Goal    Goal Priority Disciplines Outcome Interventions   Occupational Therapy Goal     OT, PT/OT Ongoing (interventions implemented as appropriate)    Description:  Goals to be met by: 6/5/19     Patient will increase functional independence with ADLs by performing:    UE Dressing with Set-up Assistance.  LE Dressing with Maximum Assistance and Assistive Devices as needed.  Grooming while standing with Minimal Assistance.  Toileting from bedside commode with Minimal Assistance for hygiene and clothing management.   Sitting at edge of bed x10 minutes with Supervision.  Rolling to Bilateral with Minimal Assistance.   Supine to sit with Minimal Assistance.  Toilet transfer to bedside commode with Minimal Assistance.                       Time Tracking:     OT Date of Treatment: 05/27/19  OT Start Time: 0934  OT Stop Time: 1000  OT Total Time (min): 26 min    Billable Minutes:Therapeutic Activity 26    PRIMITIVO Jaquez  5/27/2019

## 2019-05-27 NOTE — PROGRESS NOTES
Subjective/Objective  Interval History/Significant Events: No acute events overnight. On CPAP and tolerating it well. Will try trach collar again today.       Objective:     Vital Signs (Most Recent):  Temp: 98.1 °F (36.7 °C) (05/27/19 0702)  Pulse: 85 (05/27/19 1000)  Resp: (!) 28 (05/27/19 1000)  BP: (!) 161/72 (05/27/19 1000)  SpO2: 100 % (05/27/19 1000) Vital Signs (24h Range):  Temp:  [98.1 °F (36.7 °C)-99.3 °F (37.4 °C)] 98.1 °F (36.7 °C)  Pulse:  [68-93] 85  Resp:  [5-38] 28  SpO2:  [82 %-100 %] 100 %  BP: (112-174)/(55-85) 161/72     Weight: 77 kg (169 lb 12.1 oz)  Body mass index is 28.25 kg/m².      Intake/Output Summary (Last 24 hours) at 5/27/2019 1033  Last data filed at 5/27/2019 1000  Gross per 24 hour   Intake 2253 ml   Output 2010 ml   Net 243 ml       Physical Exam   Constitutional: She appears well-developed and well-nourished.   HENT:   Head: Normocephalic and atraumatic.   Tracheostomy in place   Eyes: Right eye exhibits no discharge. Left eye exhibits no discharge.   Neck: Normal range of motion. Neck supple.   Cardiovascular: Normal rate and regular rhythm.   Pulmonary/Chest: Effort normal. No respiratory distress.   Abdominal: Soft.   Midline incision c/d/i.  PEG with no leak. Abdomen soft, non-distended.  Bowel sounds present   Genitourinary:   Genitourinary Comments: Nephrostomy tube in place with yellow output.  Fontenot in place  No rectal bleeding since noon 5/23   Musculoskeletal: Normal range of motion.   Neurological: She is alert.   Able to follow commands, denying pain.    Skin: Skin is warm and dry.   Psychiatric: She has a normal mood and affect.   Nursing note and vitals reviewed.      Vents:  Vent Mode: Spont (05/27/19 0830)  Ventilator Initiated: Yes (05/08/19 0630)  Set Rate: 18 bmp (05/27/19 0305)  Vt Set: 420 mL (05/27/19 0305)  Pressure Support: 15 cmH20 (05/27/19 0830)  PEEP/CPAP: 5 cmH20 (05/27/19 0830)  Oxygen Concentration (%): 40 (05/27/19 1000)  Peak Airway Pressure: 22  cmH2O (05/27/19 0830)  Plateau Pressure: 18 cmH20 (05/27/19 0830)  Total Ve: 5.05 mL (05/27/19 0830)  Negative Inspiratory Force (cm H2O): -18 (04/27/19 1306)  F/VT Ratio<105 (RSBI): (!) 38.37 (05/27/19 0830)    Lines/Drains/Airways     Peripherally Inserted Central Catheter Line                 PICC Double Lumen 05/24/19 0900 right brachial 3 days          Drain                 Nephrostomy 04/21/19 0629 Left 8 Fr. 36 days         Gastrostomy/Enterostomy 05/02/19 1134 Percutaneous endoscopic gastrostomy (PEG) LUQ decompression;feeding 24 days         Urethral Catheter 05/16/19 1040 16 Fr. 10 days          Airway                 Surgical Airway 05/02/19 1107 Shiley Cuffed 24 days                Significant Labs:    CBC/Anemia Profile:  Recent Labs   Lab 05/26/19  0300 05/27/19  0330   WBC 11.28 11.53   HGB 8.1* 8.0*   HCT 25.3* 26.1*    227   MCV 90 93   RDW 14.6* 14.5        Chemistries:  Recent Labs   Lab 05/26/19  0300 05/26/19  1400 05/27/19  0330    141 143   K 3.5 4.4 4.1   * 112* 111*   CO2 23 22* 22*   BUN 28* 25* 25*   CREATININE 0.8 0.8 1.0   CALCIUM 8.9 8.5* 8.7   ALBUMIN 1.5* 1.6* 1.5*   PROT 6.1 6.1 6.2   BILITOT 0.5 0.5 0.7   ALKPHOS 128 120 118   ALT 10 9* 9*   AST 22 22 22   MG 2.2  --  2.2   PHOS 2.6*  --  3.1       All pertinent labs within the past 24 hours have been reviewed.    Significant Imaging:  I have reviewed all pertinent imaging results/findings within the past 24 hours.      Scheduled Meds:   ceFAZolin (ANCEF) IVPB  2 g Intravenous Q8H    chlorhexidine  15 mL Mouth/Throat BID    fluconazole  800 mg Per G Tube Daily    loperamide  2 mg Per G Tube QID    miconazole nitrate 2%   Topical (Top) BID    pantoprazole  40 mg Per G Tube Daily    rifAMpin (RIFADIN) IVPB  300 mg Intravenous Q12H    scopolamine  1 patch Transdermal Q3 Days    silodosin  4 mg Per G Tube Daily     Continuous Infusions:  PRN Meds:sodium chloride, acetaminophen, albuterol-ipratropium, dextrose  50%, glucagon (human recombinant), hydrALAZINE, HYDROmorphone, hydrOXYzine, insulin aspart U-100, labetalol, magnesium sulfate IVPB, magnesium sulfate IVPB, ondansetron, oxyCODONE, oxyCODONE-acetaminophen, promethazine (PHENERGAN) IVPB, sodium chloride 0.9%    Vital signs in last 24 hours:  Temp:  [98.1 °F (36.7 °C)-99.3 °F (37.4 °C)] 98.1 °F (36.7 °C)  Pulse:  [68-93] 85  Resp:  [5-38] 28  SpO2:  [82 %-100 %] 100 %  BP: (112-174)/(55-85) 161/72    Intake/Output last 3 shifts:  I/O last 3 completed shifts:  In: 2303 [I.V.:423; NG/GT:1380; IV Piggyback:500]  Out: 2118 [Urine:2118]  Intake/Output this shift:  I/O this shift:  In: 60 [NG/GT:60]  Out: 300 [Urine:300]    Problem Assessment/Plan  Orthopedic  * Ureteral transection of left native kidney  Assessment & Plan  Mariann CROW Refugio is a 59 y.o. female s/p left ureteral injury on 4/12, who presented with fever, AMS, and intraabdominal abscess, taken for washout and ligation of left ureter with nephrostomy tube placement on 4/21. S/p Trach/PEG on 5/2. Episode of negative pressure pulmonary edema on 5/8 requiring mechanical ventilation, diuresis and low dose pressor. Has been plagued by intermittent BRBPR.     Neuro:   - off sedation  - responds to questions appropriately by nodding  - no focal deficit     Pulmonary:   - CPAP vs trach collar, does not like trach collar per nursing report, will try again today   - ABGs prn  - diuresis as tolerates     Cardiac:   - alternating HTN and hypotension  - TTE 5/8 with no evidence of right heart strain or significant valvular pathology, normal LV systolic function, EF 70%     Renal:    - S/p transection of left ureter 4/12 and subsequent ligation and PCT nephrostomy tube placement with IR 4/21  - Fontenot removed 5/15, but then replaced for retention   - UO adequate  - BUN/Cr stable  - Monitor I/Os        ID:   - AF  - EBC down  - Blood 5/10 NGTD  - Ucx 5/13 candiduria  - BAL 5/18 pseudomonas  - C diff 5/5 negative  - vanc  stopped  - on fluconazole, cefepime, ancef      Hem/Onc:   - 5/24- 1U PRBC given  - stable now, continue to check CBC daily      Endocrine:  - DM Type 2  - TF @ goal 45  - LDSSI  - Endocrine following for assistance with blood glucose control.      Fluids/Electrolytes/Nutrition/GI:   - Free water flushes 300 cc q6h  - Replace electrolytes PRN   Rectal Ulcers  - intermittent hematochezia; ulcers seen to be improving on partial colonoscopy 5/21  - no bleeding in last 24 hours  - anoscopy and hemostatic agent applied by CRS 5/15  - imodium QID  - C scope by CRS 5/24 - no active bleed, healing rectal ulcer      PPx:  - DVT: Lovenox, Hep gtt held for continued bloody BMs        DISPO:  - LTAC planning   - trach collar trials            Critical secondary to Patient has a condition that poses threat to life and bodily function: Severe Respiratory Distress and rectal bleeding     Critical care was time spent personally by me on the following activities: development of treatment plan with patient or surrogate and bedside caregivers, discussions with consultants, evaluation of patient's response to treatment, examination of patient, ordering and performing treatments and interventions, ordering and review of laboratory studies, ordering and review of radiographic studies, pulse oximetry, re-evaluation of patient's condition.  This critical care time did not overlap with that of any other provider or involve time for any procedures.

## 2019-05-27 NOTE — PLAN OF CARE
Problem: Adult Inpatient Plan of Care  Goal: Plan of Care Review  Patient VSS, afebrile, sats 100% on trach collar. Tube feedings at 30 ml/hr ,goal is 45 ml/hr, tolerated well. Patient OOB to chair for 4 hours. Fall precautions in place. Patient has no complaints of pain. Plan of care discussed with patient and spouse, have no questions at this time.Will continue to monitor.

## 2019-05-27 NOTE — PT/OT/SLP PROGRESS
Physical Therapy  Co-treatment with OT    Patient Name:  Mariann Huff   MRN:  [id number]    Recommendations:     Discharge Recommendations:  rehabilitation facility   Discharge Equipment Recommendations: (will determine DME needs closer to discharge)   Barriers to discharge: Decreased caregiver support family will not be able to assist pt at current functional level.     Assessment:     Mariann Huff is a 59 y.o. female admitted with a medical diagnosis of Ureteral transection of left native kidney.  She presents with the following impairments/functional limitations:  weakness, gait instability, impaired balance, impaired endurance, impaired functional mobilty, decreased safety awareness, decreased lower extremity function pt kenyetta treatment better being able to sit in bedside chair. Pt will benefit from cont skilled PT 4x/wk to progress physically. Pt will need inpt rehab when medically stable. Pt is s/p exp lap, ligation L ureter 4/21, PEG/Trach 5/2/19. Pt is s/p back sx 4/12/19.    Rehab Prognosis: Good; patient would benefit from acute skilled PT services to address these deficits and reach maximum level of function.    Recent Surgery: Procedure(s) (LRB):  COLONOSCOPY (N/A) 3 Days Post-Op    Plan:     During this hospitalization, patient to be seen 4 x/week to address the identified rehab impairments via gait training, therapeutic activities, therapeutic exercises, neuromuscular re-education and progress toward the following goals:    · Plan of Care Expires:  06/03/19    Subjective     Chief Complaint: pt had no complaints during treatment.   Patient/Family Comments/goals:  To get better and go home.   Pain/Comfort:  · Pain Rating 1: 0/10  · Pain Rating Post-Intervention 1: 0/10      Objective:     Communicated with nurse prior to session.  Patient found supine with telemetry, pulse ox (continuous), blood pressure cuff, correa catheter, tracheostomy, oxygen, PICC line, PEG Tube(t-tube) upon PT entry to room.      General Precautions: Standard, fall   Orthopedic Precautions:    Braces: LSO     Functional Mobility:  · Bed Mobility:  Pt needed verbal cues for hand placement and sequencing for functional mobility.   · Rolling Right: maximal assistance  · Supine to Sit: maximal assistance  · Sit to Supine: maximal assistance  ·   · Transfers:     · Sit to Stand:  maximal assistance and of 2 persons with no AD  · Bed to Chair: maximal assistance  And of 2 persons with  no AD  using  Stand Pivot. Pt sat in chair x 3.5 hours. Pt was total assist don/doff LSO back brace  ·   · Balance: pt sat on EOB with max assist leaning backwards to SBA.       AM-PAC 6 CLICK MOBILITY  Turning over in bed (including adjusting bedclothes, sheets and blankets)?: 2  Sitting down on and standing up from a chair with arms (e.g., wheelchair, bedside commode, etc.): 2  Moving from lying on back to sitting on the side of the bed?: 2  Moving to and from a bed to a chair (including a wheelchair)?: 2  Need to walk in hospital room?: 1  Climbing 3-5 steps with a railing?: 1  Basic Mobility Total Score: 10       Therapeutic Activities and Exercises:   pt performed AROM there exer x 15 reps sitting in bedside chair.    Patient left supine with all lines intact, call button in reach and  present..    GOALS:   Multidisciplinary Problems     Physical Therapy Goals        Problem: Physical Therapy Goal    Goal Priority Disciplines Outcome Goal Variances Interventions   Physical Therapy Goal     PT, PT/OT      Description:  Goals to be met by: 19    Patient will increase functional independence with mobility by performin. Supine to sit with Moderate Assistance -not met  2. Sit to stand transfer with Minimal Assistance -not met  3. Gait  x 30 feet with Contact Guard Assistance using Rolling Walker. -not met  4. Lower extremity exercise program x15 reps , with assistance as needed- met                        Time Tracking:     PT Received On:  05/27/19  PT Start Time: 0942     PT Stop Time: 1003  PT Total Time (min): 21 min     Billable Minutes: Therapeutic Exercise 21 min    Treatment Type: Other (see comments)(co-treatment with OT)  PT/PTA: PT     PTA Visit Number: 0     Cathy Taylor, PT  05/27/2019

## 2019-05-27 NOTE — SUBJECTIVE & OBJECTIVE
Subjective:     Interval History: no bleeding overnight    Post-Op Info:  Procedure(s) (LRB):  COLONOSCOPY (N/A)   3 Days Post-Op      Medications:  Continuous Infusions:  Scheduled Meds:   ceFAZolin (ANCEF) IVPB  2 g Intravenous Q8H    chlorhexidine  15 mL Mouth/Throat BID    fluconazole  800 mg Per G Tube Daily    loperamide  2 mg Per G Tube QID    miconazole nitrate 2%   Topical (Top) BID    pantoprazole  40 mg Per G Tube Daily    rifAMpin (RIFADIN) IVPB  300 mg Intravenous Q12H    scopolamine  1 patch Transdermal Q3 Days    silodosin  4 mg Per G Tube Daily     PRN Meds:   sodium chloride    acetaminophen    albuterol-ipratropium    dextrose 50%    glucagon (human recombinant)    hydrALAZINE    HYDROmorphone    hydrOXYzine    insulin aspart U-100    labetalol    magnesium sulfate IVPB    magnesium sulfate IVPB    ondansetron    oxyCODONE    oxyCODONE-acetaminophen    promethazine (PHENERGAN) IVPB    sodium chloride 0.9%        Objective:     Vital Signs (Most Recent):  Temp: 98.1 °F (36.7 °C) (05/27/19 0702)  Pulse: 85 (05/27/19 1000)  Resp: (!) 28 (05/27/19 1000)  BP: (!) 161/72 (05/27/19 1000)  SpO2: 100 % (05/27/19 1000) Vital Signs (24h Range):  Temp:  [98.1 °F (36.7 °C)-99.3 °F (37.4 °C)] 98.1 °F (36.7 °C)  Pulse:  [68-93] 85  Resp:  [5-38] 28  SpO2:  [82 %-100 %] 100 %  BP: (112-174)/(55-85) 161/72     Intake/Output - Last 3 Shifts       05/25 0700 - 05/26 0659 05/26 0700 - 05/27 0659 05/27 0700 - 05/28 0659    I.V. (mL/kg) 312 (4.1) 373 (4.8)     Blood   0    NG/ 1360 80    IV Piggyback 1000 500     Total Intake(mL/kg) 1702 (22.1) 2233 (29) 80 (1)    Urine (mL/kg/hr) 1203 (0.7) 1755 (0.9) 400 (1.5)    Stool       Total Output 1203 1755 400    Net +499 +478 -320                 Physical Exam   Constitutional: No distress.   Neck: Neck supple.   Cardiovascular: Normal rate and regular rhythm.   Pulmonary/Chest: Effort normal. No respiratory distress.   Abdominal: Soft. She  exhibits no distension. There is no tenderness.   Neurological: She is alert.   Skin: Skin is warm and dry.       Significant Labs:  BMP (Last 3 Results):   Recent Labs   Lab 05/25/19  0500 05/26/19  0300 05/26/19  1400 05/27/19  0330   GLU 89 115* 110 116*    142 141 143   K 3.3* 3.5 4.4 4.1    111* 112* 111*   CO2 24 23 22* 22*   BUN 27* 28* 25* 25*   CREATININE 0.9 0.8 0.8 1.0   CALCIUM 9.1 8.9 8.5* 8.7   MG 1.7 2.2  --  2.2     CBC (Last 3 Results):   Recent Labs   Lab 05/25/19  0500 05/26/19  0300 05/27/19  0330   WBC 11.27 11.28 11.53   RBC 2.79* 2.82* 2.82*   HGB 8.1* 8.1* 8.0*   HCT 25.0* 25.3* 26.1*    235 227   MCV 90 90 93   MCH 29.0 28.7 28.4   MCHC 32.4 32.0 30.7*       Significant Diagnostics:  I have reviewed all pertinent imaging results/findings within the past 24 hours.

## 2019-05-27 NOTE — PROGRESS NOTES
[facility]  Palliative Medicine  Progress Note    Patient Name: Mariann Huff  MRN: [id number]  Admission Date: 4/20/2019  Hospital Length of Stay: 37 days  Code Status: Full Code   Attending Provider: Robin Boyd MD  Consulting Provider: DEVAN Mckeon  Primary Care Physician: Jasbir Haney MD  Principal Problem:Ureteral transection of left native kidney    Patient information was obtained from patient and spouse/SO.      Assessment/Plan:     Palliative care encounter  Impression Mrs. Huff is out of bed to chair working with physical therapy.   States no pain or discomforts at this time. Receiving oxygen by trach collar, no shortness of breath noted, no dyspnea reported.  PEG intact tolerating tube feeding.  nephrostomy tubes with clear yellow urine.       Symptom Management: Pain   - post surgical pain and pain associated with GI bleed   - 24 hr oral morphine equivalent - 30   - Mrs. Huff states no pain at this time.  Able to get out of bed to chair and work with PT.   - Encouraged patient to ask for pain medications before PT as needed.     - - At this time indicates pian in 4-5/10 and when offered states no need for pain medication  - Palliative medicine APRN discussed current pain medication strategies and encouraged her to ask for pain before pain was severe.   - Recommendations:    Continue Acetaminophen 650 mg via PEG  tube every 6 hrs as needed for mild to moderate pain and   Discontinue Oxycodone acetaminophen 10/325 mg every 4 hrs as needed for severe pain and   Discontinue Oxycodone 5 mg via PEG  tube every 6hrs as needed for moderate pain and   Change to Oxycodone 5 mg via PEG   tube every 4 hrs as needed  for moderate pain (4-6/10)  and   Oxycodone 10 mg via PEG  tube every 4 hrs as needed for severe pain (7-10/10) and   Change Hydromorphone to 1 mg IVP every 4 hrs as needed for severe pain not relieved with Oxycodone 10 mg every 4 hrs as needed for severe pain.     Bowel  Management   - Loperamide 2 mg via peg tube four times daily   - Last documented bowel movement 5/24/19  - Patient is receiving opiate pain medications      Advanced Care Planning   - no advanced directives received  -Advanced care planning education   -  is next of kin for medical decision making   - Full code per primary team.  Patient and family in agreement with this plan  - Patient and family state understanding of current clinical condtion      Goals of Care:   As per patient and medical record the plan is for transition to LTAC  - Ms. Huff states being hopeful she will continue to recover            I will sign off. Please contact us if you have any additional questions.    Subjective:     Chief Complaint:   Chief Complaint   Patient presents with    Altered Mental Status     [facility], back surgery last week, c/o malaise, confusion, fever, dizziness.        HPI:   No notes on file    Hospital Course:  No notes on file    Interval History:     Medications:  Continuous Infusions:  Scheduled Meds:   ceFAZolin (ANCEF) IVPB  2 g Intravenous Q8H    chlorhexidine  15 mL Mouth/Throat BID    fluconazole  800 mg Per G Tube Daily    loperamide  2 mg Per G Tube QID    miconazole nitrate 2%   Topical (Top) BID    pantoprazole  40 mg Per G Tube Daily    rifAMpin (RIFADIN) IVPB  300 mg Intravenous Q12H    scopolamine  1 patch Transdermal Q3 Days    silodosin  4 mg Per G Tube Daily     PRN Meds:sodium chloride, acetaminophen, albuterol-ipratropium, dextrose 50%, glucagon (human recombinant), hydrALAZINE, HYDROmorphone, hydrOXYzine, insulin aspart U-100, labetalol, magnesium sulfate IVPB, magnesium sulfate IVPB, ondansetron, oxyCODONE, oxyCODONE-acetaminophen, promethazine (PHENERGAN) IVPB, sodium chloride 0.9%    Objective:     Vital Signs (Most Recent):  Temp: 98.1 °F (36.7 °C) (05/27/19 0702)  Pulse: 82 (05/27/19 0900)  Resp: 15 (05/27/19 0900)  BP: (!) 142/67 (05/27/19  0900)  SpO2: 100 % (05/27/19 0900) Vital Signs (24h Range):  Temp:  [98.1 °F (36.7 °C)-99.3 °F (37.4 °C)] 98.1 °F (36.7 °C)  Pulse:  [68-93] 82  Resp:  [5-38] 15  SpO2:  [82 %-100 %] 100 %  BP: (112-174)/(55-85) 142/67     Weight: 77 kg (169 lb 12.1 oz)  Body mass index is 28.25 kg/m².    Review of Symptoms  Symptom Assessment (ESAS 0-10 scale)  ESAS 0 1 2 3 4 5 6 7 8 9 10   Pain    X          Dyspnea X             Anxiety X             Nausea X             Depression  X             Anorexia X             Fatigue X             Insomnia X             Restlessness  X             Agitation X             CAM / Delirium __ --  ___+   Constipation     __ --  ___+   Diarrhea           __ --  ___+  Bowel Management Plan (BMP): No    Comments: mild  post surgical pain, tolerating oxygen by trach collar.  No shortness of breath noted no dyspnea reported.      Pain Assessment:  mild  post surgical pain, 5/10, more noticeable at bedtime, relieved with oxycodone and acetaminophen.     OME in 24 hours: 30     Performance Status: 60    ECOG Performance Status Grade: 2 - Ambulates, capable of self care only    Physical Exam   Constitutional: She is oriented to person, place, and time. She appears well-developed. No distress.   Cardiovascular: Normal rate, regular rhythm and normal heart sounds.   Pulmonary/Chest: No respiratory distress.   Trach, oxygen by trach collar    Abdominal: Soft. Bowel sounds are normal.   Genitourinary:   Genitourinary Comments: Nephrostomy tube with clear yellow urine    Neurological: She is alert and oriented to person, place, and time.   Skin: Skin is warm and dry.   Psychiatric: She has a normal mood and affect. Her behavior is normal. Judgment and thought content normal.   Nursing note and vitals reviewed.      Significant Labs: All pertinent labs within the past 24 hours have been reviewed.  CBC:   Recent Labs   Lab 05/27/19  0330   WBC 11.53   HGB 8.0*   HCT 26.1*   MCV 93         BMP:  Recent Labs   Lab 05/27/19  0330   *      K 4.1   *   CO2 22*   BUN 25*   CREATININE 1.0   CALCIUM 8.7   MG 2.2     LFT:  Lab Results   Component Value Date    AST 22 05/27/2019    ALKPHOS 118 05/27/2019    BILITOT 0.7 05/27/2019     Albumin:   Albumin   Date Value Ref Range Status   05/27/2019 1.5 (L) 3.5 - 5.2 g/dL Final     Protein:   Total Protein   Date Value Ref Range Status   05/27/2019 6.2 6.0 - 8.4 g/dL Final     Lactic acid:   Lab Results   Component Value Date    LACTATE 0.8 05/13/2019    LACTATE 0.7 05/10/2019       Significant Imaging: I have reviewed all pertinent imaging results/findings within the past 24 hours.    Advanced Directives::  Living Will: No  LaPOST: No  Do Not Resuscitate Status: No  Medical Power of : No    Decision-Making Capacity: Patient answered questions    Living Arrangements: Lives with spouse    Psychosocial/Cultural:  40 years, 2 adult children, 3 grandchildren.          Spiritual:     F- Mariella and Belief: Lutheran     I - Importance:   .  C - Community:     A - Address in Care: amenable to  visits       > 50% of 35  min visit spent in chart review, face to face discussion of goals of care,  symptom assessment, coordination of care and emotional support.    Alejandra Michelle, CNS  Palliative Medicine  [facility]

## 2019-05-27 NOTE — ASSESSMENT & PLAN NOTE
Mariann Huff is a 59 y.o. female s/p left ureteral injury on 4/12, presented with fever, AMS, and intraabdominal abscess, taken for washout and ligation of left ureter with neph tube placement on 4/21, Trach/PEG 5/2.  - on vent; wean per SICU, possibly to trach collar today  - Tube feeds per SICU  - ID on board, appreciate recs:   - continue rifampin; completed cefepime x 7 days, now on Ancef; continue rifampin and Ancef until 6/18/19   - 5/13 UCx growing Candida albicans; 7 day course of diflucan 800 mg   - 5/19 BAL growing Pseudomonas   - ID recommends reimaging patient should she continue to show signs of infection  - Maintain neph tube and Fontenot  - Urine output adequate, Cr stable and WNL  - diuresis/fluids per SICU  - bloody BM - flexible sigmoidoscopy on 5/21/19 showed no signs of bleeding, Colonoscopy showed healing rectal ulcer, EGD not indicated per GI; H&H stable    Dispo: continue ICU care; plan for transfer to LTAC once stable. Holding heparin gtt currently

## 2019-05-27 NOTE — ASSESSMENT & PLAN NOTE
Mariann Huff is a 59 y.o. female s/p left ureteral injury on 4/12, who presented with fever, AMS, and intraabdominal abscess, taken for washout and ligation of left ureter with nephrostomy tube placement on 4/21. S/p Trach/PEG on 5/2. Episode of negative pressure pulmonary edema on 5/8 requiring mechanical ventilation, diuresis and low dose pressor. Has been plagued by intermittent BRBPR.     Neuro:   - off sedation  - responds to questions appropriately by nodding  - no focal deficit     Pulmonary:   - CPAP vs trach collar, does not like trach collar per nursing report, will try again today   - ABGs prn  - diuresis as tolerates     Cardiac:   - alternating HTN and hypotension  - TTE 5/8 with no evidence of right heart strain or significant valvular pathology, normal LV systolic function, EF 70%     Renal:    - S/p transection of left ureter 4/12 and subsequent ligation and PCT nephrostomy tube placement with IR 4/21  - Fontenot removed 5/15, but then replaced for retention   - UO adequate  - BUN/Cr stable  - Monitor I/Os        ID:   - AF  - EBC down  - Blood 5/10 NGTD  - Ucx 5/13 candiduria  - BAL 5/18 pseudomonas  - C diff 5/5 negative  - vanc stopped  - on fluconazole, cefepime, ancef      Hem/Onc:   - 5/24- 1U PRBC given  - stable now, continue to check CBC daily      Endocrine:  - DM Type 2  - TF @ goal 45  - LDSSI  - Endocrine following for assistance with blood glucose control.      Fluids/Electrolytes/Nutrition/GI:   - Free water flushes 300 cc q6h  - Replace electrolytes PRN   Rectal Ulcers  - intermittent hematochezia; ulcers seen to be improving on partial colonoscopy 5/21  - no bleeding in last 24 hours  - anoscopy and hemostatic agent applied by CRS 5/15  - imodium QID  - C scope by CRS 5/24 - no active bleed, healing rectal ulcer      PPx:  - DVT: Lovenox, Hep gtt held for continued bloody BMs        DISPO:  - LTAC planning   - trach collar trials            Critical secondary to Patient has a  condition that poses threat to life and bodily function: Severe Respiratory Distress and rectal bleeding     Critical care was time spent personally by me on the following activities: development of treatment plan with patient or surrogate and bedside caregivers, discussions with consultants, evaluation of patient's response to treatment, examination of patient, ordering and performing treatments and interventions, ordering and review of laboratory studies, ordering and review of radiographic studies, pulse oximetry, re-evaluation of patient's condition.  This critical care time did not overlap with that of any other provider or involve time for any procedures.

## 2019-05-27 NOTE — PROGRESS NOTES
[facility]  Urology  Progress Note    Patient Name: Mariann Huff  MRN: [id number]  Admission Date: 4/20/2019  Hospital Length of Stay: 37 days  Code Status: Full Code   Attending Provider: Robin Boyd MD   Primary Care Physician: Jasbir Haney MD    Subjective:     HPI:  Mariann Huff is a 59 y.o. female with history of HTN, type 2 diabetes mellitus, CAD, NSTEMI, and back pain who presents to [facility] with altered mental status and sepsis. She underwent L5-S1 OLIF with NSGY on 4/12 and had intraoperative left ureteral injury with ureteroureteral anastomosis and ureteral stent placement. She did well initially and had correa and MADHURI drain removed on 4/16. She began having chills and altered mental status 2 days ago and this has progressively worsened. No complaints of pain.     She is altered and HPI is limited. In the ED she is febrile to 103 and tachycardic and pressures are low normal. WBC is 5, creatinine is 3.6 baseline 1.0, lactate is 4.6. Cath UA concerning for infection, 3+ LE, >100 WBCs, and many bacteria on microscopy.    CT and MRI abdomen both show air with minimal fluid near the surgical site with left ureter coursing through. There is air throughout the proximal collecting system which is decompressed with JJ ureteral stent in good position.    Taken for ex lap, ligation of left ureter and left neph tube placement on 4/21/19.    Interval History: No acute events. Afebrile, WBC WNL. No bloody BM, H&H stable. On vent on CPAP mode. Patient received 500 cc LR for decreased UOP; UOP over 24h adequate (1665 cc.) Cr stable at 1.0.     Review of Systems  Objective:     Temp:  [98.3 °F (36.8 °C)-99.3 °F (37.4 °C)] 99.3 °F (37.4 °C)  Pulse:  [67-93] 76  Resp:  [5-38] 20  SpO2:  [82 %-100 %] 100 %  BP: (112-175)/(55-85) 136/65     Body mass index is 28.25 kg/m².      Bladder Scan Volume (mL): 27 mL (05/10/19 0846)  Post Void Cath Residual Output (mL): 27 mL (05/10/19 0846)    Drains     Drain                  Nephrostomy 04/21/19 0629 Left 8 Fr. 35 days         Gastrostomy/Enterostomy 05/02/19 1134 Percutaneous endoscopic gastrostomy (PEG) LUQ decompression;feeding 24 days         Urethral Catheter 05/16/19 1040 16 Fr. 10 days                Physical Exam   Constitutional: She appears well-developed. No distress.   HENT:   Head: Normocephalic and atraumatic.   Neck: No JVD present.   Cardiovascular: Normal rate and regular rhythm.    Pulmonary/Chest: Effort normal. No respiratory distress.   On vent   Abdominal: Soft. She exhibits no distension. There is no rebound and no guarding.   Incision c/d/i   G-tube   Genitourinary:   Genitourinary Comments: Fontenot in place draining clear yellow urine  Left neph tube with clear yellow urine   Neurological: She is alert.   Skin: Skin is warm and dry. She is not diaphoretic. No pallor.         Significant Labs:    BMP:  Recent Labs   Lab 05/26/19  0300 05/26/19  1400 05/27/19  0330    141 143   K 3.5 4.4 4.1   * 112* 111*   CO2 23 22* 22*   BUN 28* 25* 25*   CREATININE 0.8 0.8 1.0   CALCIUM 8.9 8.5* 8.7       CBC:   Recent Labs   Lab 05/25/19  0500 05/26/19  0300 05/27/19  0330   WBC 11.27 11.28 11.53   HGB 8.1* 8.1* 8.0*   HCT 25.0* 25.3* 26.1*    235 227     Significant Imaging:  All pertinent imaging results/findings from the past 24 hours have been reviewed.                  Assessment/Plan:     * Ureteral transection of left native kidney  Mariann Huff is a 59 y.o. female s/p left ureteral injury on 4/12, presented with fever, AMS, and intraabdominal abscess, taken for washout and ligation of left ureter with neph tube placement on 4/21, Trach/PEG 5/2.  - on vent; wean per SICU, possibly to trach collar today  - Tube feeds per SICU  - ID on board, appreciate recs:   - continue rifampin; completed cefepime x 7 days, now on Ancef; continue rifampin and Ancef until 6/18/19   - 5/13 UCx growing Candida albicans; 7 day course of diflucan 800 mg   -  5/19 BAL growing Pseudomonas   - ID recommends reimaging patient should she continue to show signs of infection  - Maintain neph tube and Fontenot  - Urine output adequate, Cr stable and WNL  - diuresis/fluids per SICU  - bloody BM - flexible sigmoidoscopy on 5/21/19 showed no signs of bleeding, Colonoscopy showed healing rectal ulcer, EGD not indicated per GI; H&H stable    Dispo: continue ICU care; plan for transfer to LTAC once stable. Holding heparin gtt currently    Sepsis  As above        VTE Risk Mitigation (From admission, onward)        Ordered     IP VTE LOW RISK PATIENT  Once      05/08/19 1315     Place sequential compression device  Until discontinued      04/20/19 8945          Mook Ferguson MD  Urology  [facility]

## 2019-05-27 NOTE — SUBJECTIVE & OBJECTIVE
Interval History: No acute events. Afebrile, WBC WNL. No bloody BM, H&H stable. On vent on CPAP mode. Patient received 500 cc LR for decreased UOP; UOP over 24h adequate (1665 cc.) Cr stable at 1.0.     Review of Systems  Objective:     Temp:  [98.3 °F (36.8 °C)-99.3 °F (37.4 °C)] 99.3 °F (37.4 °C)  Pulse:  [67-93] 76  Resp:  [5-38] 20  SpO2:  [82 %-100 %] 100 %  BP: (112-175)/(55-85) 136/65     Body mass index is 28.25 kg/m².      Bladder Scan Volume (mL): 27 mL (05/10/19 0846)  Post Void Cath Residual Output (mL): 27 mL (05/10/19 0846)    Drains     Drain                 Nephrostomy 04/21/19 0629 Left 8 Fr. 35 days         Gastrostomy/Enterostomy 05/02/19 1134 Percutaneous endoscopic gastrostomy (PEG) LUQ decompression;feeding 24 days         Urethral Catheter 05/16/19 1040 16 Fr. 10 days                Physical Exam   Constitutional: She appears well-developed. No distress.   HENT:   Head: Normocephalic and atraumatic.   Neck: No JVD present.   Cardiovascular: Normal rate and regular rhythm.    Pulmonary/Chest: Effort normal. No respiratory distress.   On vent   Abdominal: Soft. She exhibits no distension. There is no rebound and no guarding.   Incision c/d/i   G-tube   Genitourinary:   Genitourinary Comments: Fontenot in place draining clear yellow urine  Left neph tube with clear yellow urine   Neurological: She is alert.   Skin: Skin is warm and dry. She is not diaphoretic. No pallor.         Significant Labs:    BMP:  Recent Labs   Lab 05/26/19  0300 05/26/19  1400 05/27/19  0330    141 143   K 3.5 4.4 4.1   * 112* 111*   CO2 23 22* 22*   BUN 28* 25* 25*   CREATININE 0.8 0.8 1.0   CALCIUM 8.9 8.5* 8.7       CBC:   Recent Labs   Lab 05/25/19  0500 05/26/19  0300 05/27/19  0330   WBC 11.27 11.28 11.53   HGB 8.1* 8.1* 8.0*   HCT 25.0* 25.3* 26.1*    235 227     Significant Imaging:  All pertinent imaging results/findings from the past 24 hours have been reviewed.

## 2019-05-27 NOTE — PROGRESS NOTES
"[facility]  Endocrinology  Progress Note    Admit Date: 4/20/2019     Reason for Consult: Management of T2DM, Hyperglycemia     Surgical Procedure and Date:       04/21/2019:         1. Exploratory laparotomy        2. Ligation of left ureter        3. Removal of left JJ ureteral stent      Diabetes diagnosis year: ANDRE    Home Diabetes Medications:  ANDRE  Toujeo 50 BID  Invokana 300mg daily   Novolog 35 units AM, 45 units PM, 35 units PM    How often checking glucose at home? ANDRE   BG readings on regimen: ANDRE  Hypoglycemia on the regimen?  ANDRE  Missed doses on regimen?  ANDRE    Diabetes Complications include:     Hyperglycemia and Diabetic retinopathy     Complicating diabetes co morbidities:   History of MI and MURTAZA, CAD, HLD    HPI:   Patient is a 59 y.o. female with a diagnosis of HTN, HLN, DM type 2, nonproliferative diabetic renopathy, CAD, NSTEMI, and back pain who presents to the ED with a complaint of altered mental status on 04/20/2019. Is now s/p Exploratory laparotomy, Ligation of left ureter, and Removal of left JJ ureteral stent. Endocrinology consulted to manage DM2/Hyperglycemia.    Lab Results   Component Value Date    HGBA1C 10.8 (H) 02/19/2019       Interval HPI:   Overnight events: Remains in SICU. NAEON. BG within goal ranges; not requiring SQ Novolog correction scale.  Eating:   NPO  Nausea: Yes  Hypoglycemia and intervention: No  Fever: No  TPN and/or TF: Yes  If yes, type of TF/TPN and rate: Glucerna 1.5 at 20 cc/hr (goal 45)     /60 (BP Location: Left arm, Patient Position: Lying)   Pulse 73   Temp 98.1 °F (36.7 °C) (Oral)   Resp 17   Ht 5' 5" (1.651 m)   Wt 77 kg (169 lb 12.1 oz)   SpO2 100%   Breastfeeding? No   BMI 28.25 kg/m²      Labs Reviewed and Include    Recent Labs   Lab 05/27/19  0330   *   CALCIUM 8.7   ALBUMIN 1.5*   PROT 6.2      K 4.1   CO2 22*   *   BUN 25*   CREATININE 1.0   ALKPHOS 118   ALT 9*   AST 22   BILITOT 0.7     Lab " Results   Component Value Date    WBC 11.53 05/27/2019    HGB 8.0 (L) 05/27/2019    HCT 26.1 (L) 05/27/2019    MCV 93 05/27/2019     05/27/2019     No results for input(s): TSH, FREET4 in the last 168 hours.  Lab Results   Component Value Date    HGBA1C 5.4 05/24/2019       Nutritional status:   Body mass index is 28.25 kg/m².  Lab Results   Component Value Date    ALBUMIN 1.5 (L) 05/27/2019    ALBUMIN 1.6 (L) 05/26/2019    ALBUMIN 1.5 (L) 05/26/2019     No results found for: PREALBUMIN    Estimated Creatinine Clearance: 62.9 mL/min (based on SCr of 1 mg/dL).    Accu-Checks  Recent Labs     05/24/19  1145 05/25/19  0459 05/25/19  1002 05/25/19  1710 05/26/19  0749 05/26/19  1215 05/26/19  1607 05/27/19  0329   POCTGLUCOSE 93 106 86 87 116* 109 116* 142*       Current Medications and/or Treatments Impacting Glycemic Control  Immunotherapy:    Immunosuppressants     None        Steroids:   Hormones (From admission, onward)    None        Pressors:    Autonomic Drugs (From admission, onward)    None        Hyperglycemia/Diabetes Medications:   Antihyperglycemics (From admission, onward)    Start     Stop Route Frequency Ordered    05/24/19 1835  insulin aspart U-100 pen 0-5 Units      -- SubQ Every 6 hours PRN 05/24/19 1735          ASSESSMENT and PLAN    * Ureteral transection of left native kidney  Managed per primary team  Avoid hypoglycemia        Type 2 diabetes mellitus with diabetic peripheral angiopathy without gangrene  BG goal 140 - 180    Low Dose SQ Insulin Correction Scale PRN for BG excursions.  BG monitoring q 4 hrs.     ** Please notify Endocrine for any change and/or advance in diet/TF**  ** Please call Endocrine for any BG related issues **    Discharge Recommendations:     TBD.             CAD (coronary artery disease)  Managed per primary team  Condition may cause insulin resistance       Sleep apnea  May affect BG readings if uncontrolled        PAPA (acute kidney injury)  Caution with  insulin stacking        HLD (hyperlipidemia)  Managed per primary team  Condition may cause insulin resistance         On enteral nutrition  May cause BG excursions.           Jolanta Morales NP  Endocrinology  [facility]

## 2019-05-28 LAB
ABO + RH BLD: NORMAL
ALBUMIN SERPL BCP-MCNC: 1.5 G/DL (ref 3.5–5.2)
ALP SERPL-CCNC: 113 U/L (ref 55–135)
ALT SERPL W/O P-5'-P-CCNC: 5 U/L (ref 10–44)
ANION GAP SERPL CALC-SCNC: 10 MMOL/L (ref 8–16)
AST SERPL-CCNC: 16 U/L (ref 10–40)
BASOPHILS # BLD AUTO: 0.06 K/UL (ref 0–0.2)
BASOPHILS NFR BLD: 0.6 % (ref 0–1.9)
BILIRUB SERPL-MCNC: 0.4 MG/DL (ref 0.1–1)
BLD GP AB SCN CELLS X3 SERPL QL: NORMAL
BUN SERPL-MCNC: 29 MG/DL (ref 6–20)
CALCIUM SERPL-MCNC: 8.9 MG/DL (ref 8.7–10.5)
CHLORIDE SERPL-SCNC: 112 MMOL/L (ref 95–110)
CO2 SERPL-SCNC: 22 MMOL/L (ref 23–29)
CREAT SERPL-MCNC: 1.1 MG/DL (ref 0.5–1.4)
DIFFERENTIAL METHOD: ABNORMAL
EOSINOPHIL # BLD AUTO: 1.6 K/UL (ref 0–0.5)
EOSINOPHIL NFR BLD: 14.4 % (ref 0–8)
ERYTHROCYTE [DISTWIDTH] IN BLOOD BY AUTOMATED COUNT: 14.5 % (ref 11.5–14.5)
EST. GFR  (AFRICAN AMERICAN): >60 ML/MIN/1.73 M^2
EST. GFR  (NON AFRICAN AMERICAN): 55.5 ML/MIN/1.73 M^2
GLUCOSE SERPL-MCNC: 133 MG/DL (ref 70–110)
HCT VFR BLD AUTO: 24.7 % (ref 37–48.5)
HGB BLD-MCNC: 7.8 G/DL (ref 12–16)
IMM GRANULOCYTES # BLD AUTO: 0.05 K/UL (ref 0–0.04)
IMM GRANULOCYTES NFR BLD AUTO: 0.5 % (ref 0–0.5)
INR PPP: 1.1 (ref 0.8–1.2)
LYMPHOCYTES # BLD AUTO: 1.3 K/UL (ref 1–4.8)
LYMPHOCYTES NFR BLD: 11.6 % (ref 18–48)
MAGNESIUM SERPL-MCNC: 2.1 MG/DL (ref 1.6–2.6)
MCH RBC QN AUTO: 29.1 PG (ref 27–31)
MCHC RBC AUTO-ENTMCNC: 31.6 G/DL (ref 32–36)
MCV RBC AUTO: 92 FL (ref 82–98)
MONOCYTES # BLD AUTO: 0.7 K/UL (ref 0.3–1)
MONOCYTES NFR BLD: 6.5 % (ref 4–15)
NEUTROPHILS # BLD AUTO: 7.3 K/UL (ref 1.8–7.7)
NEUTROPHILS NFR BLD: 66.4 % (ref 38–73)
NRBC BLD-RTO: 0 /100 WBC
PHOSPHATE SERPL-MCNC: 3.4 MG/DL (ref 2.7–4.5)
PLATELET # BLD AUTO: 245 K/UL (ref 150–350)
PMV BLD AUTO: 11.1 FL (ref 9.2–12.9)
POCT GLUCOSE: 112 MG/DL (ref 70–110)
POCT GLUCOSE: 131 MG/DL (ref 70–110)
POCT GLUCOSE: 138 MG/DL (ref 70–110)
POCT GLUCOSE: 138 MG/DL (ref 70–110)
POTASSIUM SERPL-SCNC: 4.1 MMOL/L (ref 3.5–5.1)
PROT SERPL-MCNC: 6.1 G/DL (ref 6–8.4)
PROTHROMBIN TIME: 11.3 SEC (ref 9–12.5)
RBC # BLD AUTO: 2.68 M/UL (ref 4–5.4)
SODIUM SERPL-SCNC: 144 MMOL/L (ref 136–145)
WBC # BLD AUTO: 10.9 K/UL (ref 3.9–12.7)

## 2019-05-28 PROCEDURE — 99900035 HC TECH TIME PER 15 MIN (STAT)

## 2019-05-28 PROCEDURE — 99233 PR SUBSEQUENT HOSPITAL CARE,LEVL III: ICD-10-PCS | Mod: GC,,, | Performed by: SURGERY

## 2019-05-28 PROCEDURE — 99900026 HC AIRWAY MAINTENANCE (STAT)

## 2019-05-28 PROCEDURE — 25000003 PHARM REV CODE 250: Performed by: STUDENT IN AN ORGANIZED HEALTH CARE EDUCATION/TRAINING PROGRAM

## 2019-05-28 PROCEDURE — 99232 PR SUBSEQUENT HOSPITAL CARE,LEVL II: ICD-10-PCS | Mod: ,,, | Performed by: NURSE PRACTITIONER

## 2019-05-28 PROCEDURE — 27000221 HC OXYGEN, UP TO 24 HOURS

## 2019-05-28 PROCEDURE — 80053 COMPREHEN METABOLIC PANEL: CPT

## 2019-05-28 PROCEDURE — 99232 SBSQ HOSP IP/OBS MODERATE 35: CPT | Mod: ,,, | Performed by: NURSE PRACTITIONER

## 2019-05-28 PROCEDURE — 97530 THERAPEUTIC ACTIVITIES: CPT

## 2019-05-28 PROCEDURE — 85610 PROTHROMBIN TIME: CPT

## 2019-05-28 PROCEDURE — 94761 N-INVAS EAR/PLS OXIMETRY MLT: CPT

## 2019-05-28 PROCEDURE — 99233 SBSQ HOSP IP/OBS HIGH 50: CPT | Mod: GC,,, | Performed by: SURGERY

## 2019-05-28 PROCEDURE — 63600175 PHARM REV CODE 636 W HCPCS: Performed by: STUDENT IN AN ORGANIZED HEALTH CARE EDUCATION/TRAINING PROGRAM

## 2019-05-28 PROCEDURE — 85025 COMPLETE CBC W/AUTO DIFF WBC: CPT

## 2019-05-28 PROCEDURE — 83735 ASSAY OF MAGNESIUM: CPT

## 2019-05-28 PROCEDURE — 84100 ASSAY OF PHOSPHORUS: CPT

## 2019-05-28 PROCEDURE — 20000000 HC ICU ROOM

## 2019-05-28 PROCEDURE — 97110 THERAPEUTIC EXERCISES: CPT

## 2019-05-28 PROCEDURE — 94002 VENT MGMT INPAT INIT DAY: CPT

## 2019-05-28 PROCEDURE — 86901 BLOOD TYPING SEROLOGIC RH(D): CPT

## 2019-05-28 RX ORDER — FUROSEMIDE 10 MG/ML
20 INJECTION INTRAMUSCULAR; INTRAVENOUS ONCE
Status: COMPLETED | OUTPATIENT
Start: 2019-05-28 | End: 2019-05-28

## 2019-05-28 RX ADMIN — CEFAZOLIN 2 G: 1 INJECTION, POWDER, FOR SOLUTION INTRAMUSCULAR; INTRAVENOUS at 02:05

## 2019-05-28 RX ADMIN — HYDROXYZINE HYDROCHLORIDE 10 MG: 10 SOLUTION ORAL at 08:05

## 2019-05-28 RX ADMIN — FLUCONAZOLE 800 MG: 200 TABLET ORAL at 09:05

## 2019-05-28 RX ADMIN — CHLORHEXIDINE GLUCONATE 0.12% ORAL RINSE 15 ML: 1.2 LIQUID ORAL at 09:05

## 2019-05-28 RX ADMIN — ONDANSETRON 4 MG: 2 INJECTION INTRAMUSCULAR; INTRAVENOUS at 09:05

## 2019-05-28 RX ADMIN — MICONAZOLE NITRATE: 20 OINTMENT TOPICAL at 09:05

## 2019-05-28 RX ADMIN — RIFAMPIN 300 MG: 600 INJECTION, POWDER, LYOPHILIZED, FOR SOLUTION INTRAVENOUS at 03:05

## 2019-05-28 RX ADMIN — SILODOSIN 4 MG: 4 CAPSULE ORAL at 09:05

## 2019-05-28 RX ADMIN — CEFAZOLIN 2 G: 1 INJECTION, POWDER, FOR SOLUTION INTRAMUSCULAR; INTRAVENOUS at 05:05

## 2019-05-28 RX ADMIN — MICONAZOLE NITRATE: 20 OINTMENT TOPICAL at 08:05

## 2019-05-28 RX ADMIN — CEFAZOLIN 2 G: 1 INJECTION, POWDER, FOR SOLUTION INTRAMUSCULAR; INTRAVENOUS at 10:05

## 2019-05-28 RX ADMIN — FUROSEMIDE 20 MG: 10 INJECTION, SOLUTION INTRAMUSCULAR; INTRAVENOUS at 04:05

## 2019-05-28 RX ADMIN — CHLORHEXIDINE GLUCONATE 0.12% ORAL RINSE 15 ML: 1.2 LIQUID ORAL at 08:05

## 2019-05-28 RX ADMIN — PANTOPRAZOLE SODIUM 40 MG: 40 GRANULE, DELAYED RELEASE ORAL at 09:05

## 2019-05-28 NOTE — PROGRESS NOTES
Correa removed at 08:30, pt due to void. Bladder scan showed 220cc. Urology MD states to bladder scan pt again around 19:00 before replacing ocrrea. Will notify urology MD results of next bladder scan. VSS. Will continue to monitor.

## 2019-05-28 NOTE — PROGRESS NOTES
"[facility]  Endocrinology  Progress Note    Admit Date: 4/20/2019     Reason for Consult: Management of T2DM, Hyperglycemia     Surgical Procedure and Date:       04/21/2019:         1. Exploratory laparotomy        2. Ligation of left ureter        3. Removal of left JJ ureteral stent      Diabetes diagnosis year: ANDRE    Home Diabetes Medications:  ANDRE  Toujeo 50 BID  Invokana 300mg daily   Novolog 35 units AM, 45 units PM, 35 units PM    How often checking glucose at home? ANDRE   BG readings on regimen: ANDRE  Hypoglycemia on the regimen?  ANDRE  Missed doses on regimen?  ANDRE    Diabetes Complications include:     Hyperglycemia and Diabetic retinopathy     Complicating diabetes co morbidities:   History of MI and MURTAZA, CAD, HLD    HPI:   Patient is a 59 y.o. female with a diagnosis of HTN, HLN, DM type 2, nonproliferative diabetic renopathy, CAD, NSTEMI, and back pain who presents to the ED with a complaint of altered mental status on 04/20/2019. Is now s/p Exploratory laparotomy, Ligation of left ureter, and Removal of left JJ ureteral stent. Endocrinology consulted to manage DM2/Hyperglycemia.    Lab Results   Component Value Date    HGBA1C 10.8 (H) 02/19/2019       Interval HPI:   Overnight events: Remains in SICU. NAEON. BG within goal ranges; not requiring SQ correction scale coverage. Tube feedings on hold at this time for N/V.  Eating:   NPO  Nausea: Yes  Hypoglycemia and intervention: No  Fever: No  TPN and/or TF: Yes (on hold)   If yes, type of TF/TPN and rate: Glucerna 1.5 (goal 45 cc/hr)     BP (!) 168/81 (BP Location: Left arm, Patient Position: Lying)   Pulse 79   Temp 98.5 °F (36.9 °C) (Oral)   Resp 18   Ht 5' 5" (1.651 m)   Wt 77 kg (169 lb 12.1 oz)   SpO2 100%   Breastfeeding? No   BMI 28.25 kg/m²      Labs Reviewed and Include    Recent Labs   Lab 05/28/19  0311   *   CALCIUM 8.9   ALBUMIN 1.5*   PROT 6.1      K 4.1   CO2 22*   *   BUN 29*   CREATININE 1.1 "   ALKPHOS 113   ALT 5*   AST 16   BILITOT 0.4     Lab Results   Component Value Date    WBC 10.90 05/28/2019    HGB 7.8 (L) 05/28/2019    HCT 24.7 (L) 05/28/2019    MCV 92 05/28/2019     05/28/2019     No results for input(s): TSH, FREET4 in the last 168 hours.  Lab Results   Component Value Date    HGBA1C 5.4 05/24/2019       Nutritional status:   Body mass index is 28.25 kg/m².  Lab Results   Component Value Date    ALBUMIN 1.5 (L) 05/28/2019    ALBUMIN 1.5 (L) 05/27/2019    ALBUMIN 1.6 (L) 05/26/2019     No results found for: PREALBUMIN    Estimated Creatinine Clearance: 57.2 mL/min (based on SCr of 1.1 mg/dL).    Accu-Checks  Recent Labs     05/26/19  1215 05/26/19  1607 05/27/19  0329 05/27/19  0740 05/27/19  1117 05/27/19  1548 05/27/19  1947 05/28/19  0003 05/28/19  0714 05/28/19  1117   POCTGLUCOSE 109 116* 142* 120* 127* 134* 141* 138* 131* 138*       Current Medications and/or Treatments Impacting Glycemic Control  Immunotherapy:    Immunosuppressants     None        Steroids:   Hormones (From admission, onward)    None        Pressors:    Autonomic Drugs (From admission, onward)    None        Hyperglycemia/Diabetes Medications:   Antihyperglycemics (From admission, onward)    Start     Stop Route Frequency Ordered    05/24/19 1835  insulin aspart U-100 pen 0-5 Units      -- SubQ Every 6 hours PRN 05/24/19 1735          ASSESSMENT and PLAN    * Ureteral transection of left native kidney  Managed per primary team  Avoid hypoglycemia        Type 2 diabetes mellitus with diabetic peripheral angiopathy without gangrene  BG goal 140 - 180    Low Dose SQ Insulin Correction Scale PRN for BG excursions.  BG monitoring q 4 hrs.     ** Please notify Endocrine for any change and/or advance in diet/TF**  ** Please call Endocrine for any BG related issues **    Discharge Recommendations:     TBD.             CAD (coronary artery disease)  Managed per primary team  Condition may cause insulin resistance        Sleep apnea  May affect BG readings if uncontrolled        PAPA (acute kidney injury)  Caution with insulin stacking        HLD (hyperlipidemia)  Managed per primary team  Condition may cause insulin resistance         On enteral nutrition  May cause BG excursions.           Jolanta Morales NP  Endocrinology  [facility]

## 2019-05-28 NOTE — PLAN OF CARE
Problem: Adult Inpatient Plan of Care  Goal: Plan of Care Review  Outcome: Ongoing (interventions implemented as appropriate)  Pt on trach collar 10L and 40% FiO2, O2 sats 100%. HR 70-80 NSR. SBP maintained <180 and MAP >60. TFs paused this AM per N/V, zofran given x1. TF restarted @ 10cc/h at 15:00, pt tolerating well. Nephrostomy with yellow output. Accuchecks done Q4h. Plan of care reviewed with pt and family. VSS at this time. Will continue to monitor. See flowsheet for full assessment details.

## 2019-05-28 NOTE — ASSESSMENT & PLAN NOTE
Mariann Huff is a 59 y.o. female s/p left ureteral injury on 4/12, who presented with fever, AMS, and intraabdominal abscess, taken for washout and ligation of left ureter with nephrostomy tube placement on 4/21. S/p Trach/PEG on 5/2. Episode of negative pressure pulmonary edema on 5/8 requiring mechanical ventilation, diuresis and low dose pressor. Has been plagued by intermittent BRBPR.     Neuro:   - off sedation  - responds to questions appropriately by nodding  - no focal deficit     Pulmonary:   - CPAP vs trach collar, does not like trach collar per nursing report, will try again today   - ABGs prn  - diuresis as tolerates     Cardiac:   - alternating HTN and hypotension  - TTE 5/8 with no evidence of right heart strain or significant valvular pathology, normal LV systolic function, EF 70%     Renal:    - S/p transection of left ureter 4/12 and subsequent ligation and PCT nephrostomy tube placement with IR 4/21  - Fontenot removed 5/15, but then replaced for retention   - UOP improved s/p lasix 20 mg x1  - Fontenot removal and f/u VT  - BUN/Cr 29/1.1 from 25/1.0 overnight  - Monitor I/Os     ID:   - AF  - WBC stable  - Blood 5/10 NGTD  - Ucx 5/13 candiduria  - BAL 5/18 pseudomonas  - C diff 5/5 negative  - on fluconazole, ancef     Hem/Onc:   - 5/24- 1U PRBC given  - H/H 7.8/24.7 from 8.0/26.1 overnight  - Continue to check CBC daily      Endocrine:  - DM Type 2  - LDSSI  - Endocrine following for assistance with blood glucose control.      Fluids/Electrolytes/Nutrition/GI:   - Free water flushes 300 cc q6h  - Replace electrolytes PRN  - Currently holding TF's (goal 45) - will restart at trickle rate and advance as tolerated once nausea resolves    # Rectal Ulcers  - intermittent hematochezia; ulcers seen to be improving on partial colonoscopy 5/21  - no bleeding in last 24 hours  - anoscopy and hemostatic agent applied by CRS 5/15  - imodium QID  - C scope by CRS 5/24 - no active bleed, healing rectal ulcer       PPx:  - DVT: Lovenox, Hep gtt held for continued bloody BMs     DISPO:  - LTAC planning   - trach collar trials

## 2019-05-28 NOTE — SUBJECTIVE & OBJECTIVE
Interval History/Significant Events: No acute events overnight. H/H 7.8/24.7 from 8.0/26.1 overnight. BUN/Cr 29/1.1 from 25/1.0 overnight.  UOP improved s/p lasix dose. CXR showing increasing edema. No bloody BM's. AFVSS.    Objective:     Vital Signs (Most Recent):  Temp: 98.8 °F (37.1 °C) (05/28/19 0700)  Pulse: 79 (05/28/19 0930)  Resp: 14 (05/28/19 0930)  BP: (!) 162/77 (05/28/19 0900)  SpO2: 100 % (05/28/19 0930) Vital Signs (24h Range):  Temp:  [98 °F (36.7 °C)-98.8 °F (37.1 °C)] 98.8 °F (37.1 °C)  Pulse:  [66-88] 79  Resp:  [10-46] 14  SpO2:  [97 %-100 %] 100 %  BP: (118-180)/() 162/77     Weight: 77 kg (169 lb 12.1 oz)  Body mass index is 28.25 kg/m².      Intake/Output Summary (Last 24 hours) at 5/28/2019 0937  Last data filed at 5/28/2019 0900  Gross per 24 hour   Intake 1647.2 ml   Output 1550 ml   Net 97.2 ml       Physical Exam   Constitutional: She appears well-developed and well-nourished.   HENT:   Head: Normocephalic and atraumatic.   Tracheostomy in place   Eyes: Right eye exhibits no discharge. Left eye exhibits no discharge.   Neck: Normal range of motion. Neck supple.   Cardiovascular: Normal rate and regular rhythm.   Pulmonary/Chest: Effort normal. No respiratory distress.   Abdominal: Soft.   Midline incision c/d/i.  PEG with no leak. Abdomen soft, non-distended.  Bowel sounds present   Genitourinary:   Genitourinary Comments: Nephrostomy tube in place with yellow output.  Fontenot in place draining cloudy yellow urine   Musculoskeletal: Normal range of motion.   Neurological: She is alert.   Able to follow commands, denying pain.    Skin: Skin is warm and dry.   Psychiatric: She has a normal mood and affect.   Nursing note and vitals reviewed.      Vents:  Vent Mode: Spont (05/28/19 0828)  Ventilator Initiated: Yes (05/08/19 0630)  Set Rate: 18 bmp (05/27/19 0305)  Vt Set: 420 mL (05/27/19 0305)  Pressure Support: 15 cmH20 (05/27/19 0830)  PEEP/CPAP: 5 cmH20 (05/28/19 0828)  Oxygen  Concentration (%): 40 (05/28/19 0900)  Peak Airway Pressure: 40 cmH2O (05/28/19 0828)  Plateau Pressure: 0 cmH20 (05/28/19 0828)  Total Ve: 3.46 mL (05/28/19 0828)  Negative Inspiratory Force (cm H2O): -18 (04/27/19 1306)  F/VT Ratio<105 (RSBI): (!) 27.48 (05/28/19 0543)    Lines/Drains/Airways     Peripherally Inserted Central Catheter Line                 PICC Double Lumen 05/24/19 0900 right brachial 4 days          Drain                 Nephrostomy 04/21/19 0629 Left 8 Fr. 37 days         Gastrostomy/Enterostomy 05/02/19 1134 Percutaneous endoscopic gastrostomy (PEG) LUQ decompression;feeding 25 days    Female External Urinary Catheter 05/28/19 0830 less than 1 day          Airway                 Surgical Airway 05/02/19 1107 Shiley Cuffed 25 days                Significant Labs:    CBC/Anemia Profile:  Recent Labs   Lab 05/27/19  0330 05/28/19  0311   WBC 11.53 10.90   HGB 8.0* 7.8*   HCT 26.1* 24.7*    245   MCV 93 92   RDW 14.5 14.5        Chemistries:  Recent Labs   Lab 05/26/19  1400 05/27/19  0330 05/28/19  0311    143 144   K 4.4 4.1 4.1   * 111* 112*   CO2 22* 22* 22*   BUN 25* 25* 29*   CREATININE 0.8 1.0 1.1   CALCIUM 8.5* 8.7 8.9   ALBUMIN 1.6* 1.5* 1.5*   PROT 6.1 6.2 6.1   BILITOT 0.5 0.7 0.4   ALKPHOS 120 118 113   ALT 9* 9* 5*   AST 22 22 16   MG  --  2.2 2.1   PHOS  --  3.1 3.4       All pertinent labs within the past 24 hours have been reviewed.    Significant Imaging:  I have reviewed all pertinent imaging results/findings within the past 24 hours.

## 2019-05-28 NOTE — PT/OT/SLP PROGRESS
Occupational Therapy   Treatment    Name: Mariann Huff  MRN: [id number]  Admitting Diagnosis:  Ureteral transection of left native kidney  4 Days Post-Op    Recommendations:     Discharge Recommendations: rehabilitation facility  Discharge Equipment Recommendations:  (TBD closer to d/c)  Barriers to discharge:  Decreased caregiver support    Assessment:     Mariann Huff is a 59 y.o. female with a medical diagnosis of Ureteral transection of left native kidney.  She presents s/p  above injury following previous back surgery. Performance deficits affecting function are weakness, gait instability, decreased upper extremity function, decreased lower extremity function, impaired balance, impaired endurance, impaired self care skills, impaired functional mobilty, pain, decreased safety awareness, impaired cardiopulmonary response to activity.     Rehab Prognosis:  Good; patient would benefit from acute skilled OT services to address these deficits and reach maximum level of function.       Plan:     Patient to be seen 4 x/week to address the above listed problems via self-care/home management, therapeutic activities, therapeutic exercises  · Plan of Care Expires: 06/05/19  · Plan of Care Reviewed with: patient, family    Subjective     Pain/Comfort:  · Pain Rating 1: (c/o L upper, lateral thigh pain; does not rate; RN notified)    Objective:     Communicated with: RN prior to session.  Patient found supine with blood pressure cuff, pulse ox (continuous), telemetry, ventilator, tracheostomy, PEG Tube, PICC line, PureWick(nephrostomy tube) upon OT entry to room.    General Precautions: Standard, fall   Orthopedic Precautions:N/A   Braces:       Occupational Performance:     Bed Mobility:    · Patient completed Rolling/Turning to Right with moderate assistance  · Patient completed Scooting/Bridging with moderate assistance  · Patient completed Supine to Sit with moderate assistance  · Patient completed Sit to Supine  with maximal assistance     Functional Mobility/Transfers:  · Patient completed Sit <> Stand Transfer with minimum assistance  with  rolling walker x 2 trials from EOB  · Functional Mobility: Moderate A for sidesteps along EOB using RW    Activities of Daily Living:  · Grooming: minimum assistance seated EOB  · Upper Body Dressing: maximal assistance donning LSO  · Lower Body Dressing: total assistance donning socks  · Toileting: maximal assistance        AMPAC 6 Click ADL: 12    Treatment & Education:  Pt/family ed on OT POC and agreeable to session  Pt sat EOB x 15 min with SBA<>CGA for static sitting 2* occasional posterior fall  Pt completed B UE/LE AROM Ex's x 15 reps in all planes (unable to finish L hip flexion ex's 2* pain)  Pt returned supine and repositioned for comfort    Patient left supine with all lines intact, call button in reach, RN notified and family presentEducation:      GOALS:   Multidisciplinary Problems     Occupational Therapy Goals        Problem: Occupational Therapy Goal    Goal Priority Disciplines Outcome Interventions   Occupational Therapy Goal     OT, PT/OT Ongoing (interventions implemented as appropriate)    Description:  Goals to be met by: 6/5/19     Patient will increase functional independence with ADLs by performing:    UE Dressing with Set-up Assistance.  LE Dressing with Maximum Assistance and Assistive Devices as needed.  Grooming while standing with Minimal Assistance.  Toileting from bedside commode with Minimal Assistance for hygiene and clothing management.   Sitting at edge of bed x10 minutes with Supervision.  Rolling to Bilateral with Minimal Assistance.   Supine to sit with Minimal Assistance.  Toilet transfer to bedside commode with Minimal Assistance.                       Time Tracking:     OT Date of Treatment: 05/28/19  OT Start Time: 1044  OT Stop Time: 1111  OT Total Time (min): 27 min    Billable Minutes:Therapeutic Activity 17  Therapeutic Exercise  10    PRIMITIVO Dash  5/28/2019

## 2019-05-28 NOTE — PLAN OF CARE
"Problem: Adult Inpatient Plan of Care  Goal: Plan of Care Review  Outcome: Ongoing (interventions implemented as appropriate)  Dx: Ureteral transection of left native kidney    Shift Events: VSS; pt's vent setting Spontaneous, FiO2 40%, PEEP 5 with sats remaining 100%. No acute events.    Neuro: AAO x4, Arouses to Voice, Follows Commands and Moves All Extremities    Vital Signs: BP (!) 141/66 (BP Location: Left arm, Patient Position: Lying)   Pulse 72   Temp 98.4 °F (36.9 °C) (Oral)   Resp (!) 29   Ht 5' 5" (1.651 m)   Wt 77 kg (169 lb 12.1 oz)   SpO2 100%   Breastfeeding? No   BMI 28.25 kg/m²     Diet: NPO and Tube Feeds    Gtts: no gtts    Urine Output: Urinary Catheter  cc/hour    Drains: nephrostomy /4hrs     Labs/Accuchecks: Daily labs, accuchecks Q4    Skin: Wound care done per orders. Sacrum, elbows and heels without new breakdown         "

## 2019-05-28 NOTE — PROGRESS NOTES
[facility]  Urology  Progress Note    Patient Name: Mariann Huff  MRN: [id number]  Admission Date: 4/20/2019  Hospital Length of Stay: 38 days  Code Status: Full Code   Attending Provider: Robin Boyd MD   Primary Care Physician: Jasbir Haney MD    Subjective:     HPI:  Mariann Huff is a 59 y.o. female with history of HTN, type 2 diabetes mellitus, CAD, NSTEMI, and back pain who [place] [facility] [place] altered mental status and sepsis. She underwent L5-S1 OLIF with NSGY on 4/12 and had intraoperative left ureteral injury with ureteroureteral anastomosis and ureteral stent placement. She did well initially and had correa and MADHURI drain removed on 4/16. She began having chills and altered mental status 2 days ago and this has progressively worsened. No complaints of pain.     She is altered and HPI is limited. In the ED she is febrile to 103 and tachycardic and pressures are low normal. WBC is 5, creatinine is 3.6 baseline 1.0, lactate is 4.6. Cath UA concerning for infection, 3+ LE, >100 WBCs, and many bacteria on microscopy.    CT and MRI abdomen both show air with minimal fluid near the surgical site with left ureter coursing through. There is air throughout the proximal collecting system which is decompressed with JJ ureteral stent in good position.    Taken for ex lap, ligation of left ureter and left neph tube placement on 4/21/19.    Interval History: No acute events. Tolerated trach collar during the day yesterday, back on vent (CPAP mode) overnight. UOP adequate, Cr stable at 1.1. No bloody BM, H&H stable.     Review of Systems  Objective:     Temp:  [98 °F (36.7 °C)-98.8 °F (37.1 °C)] 98.8 °F (37.1 °C)  Pulse:  [66-88] 66  Resp:  [10-29] 23  SpO2:  [97 %-100 %] 100 %  BP: (118-180)/() 141/68     Body mass index is 28.25 kg/m².    Date 05/28/19 0700 - 05/29/19 0659   Shift 0416-6468 2273-8665 1821-1886 24 Hour Total   INTAKE   NG/GT 30   30   Shift Total(mL/kg) 30(0.4)   30(0.4)    OUTPUT   Urine(mL/kg/hr) 185   185   Shift Total(mL/kg) 185(2.4)   185(2.4)   Weight (kg) 77 77 77 77     Bladder Scan Volume (mL): 27 mL (05/10/19 0846)  Post Void Cath Residual Output (mL): 27 mL (05/10/19 0846)    Drains     Drain                 Nephrostomy 04/21/19 0629 Left 8 Fr. 37 days         Gastrostomy/Enterostomy 05/02/19 1134 Percutaneous endoscopic gastrostomy (PEG) LUQ decompression;feeding 25 days         Urethral Catheter 05/16/19 1040 16 Fr. 11 days                Physical Exam   Constitutional: She appears well-developed. No distress.   HENT:   Head: Normocephalic and atraumatic.   Neck: No JVD present.   Cardiovascular: Normal rate and regular rhythm.    Pulmonary/Chest: Effort normal. No respiratory distress.   On vent   Abdominal: Soft. She exhibits no distension. There is no rebound and no guarding.   Incision c/d/i   G-tube   Genitourinary:   Genitourinary Comments: Fontenot in place draining clear yellow urine  Left neph tube with clear yellow urine   Neurological: She is alert.   Skin: Skin is warm and dry. She is not diaphoretic. No pallor.         Significant Labs:    BMP:  Recent Labs   Lab 05/26/19  1400 05/27/19  0330 05/28/19  0311    143 144   K 4.4 4.1 4.1   * 111* 112*   CO2 22* 22* 22*   BUN 25* 25* 29*   CREATININE 0.8 1.0 1.1   CALCIUM 8.5* 8.7 8.9       CBC:   Recent Labs   Lab 05/26/19  0300 05/27/19  0330 05/28/19  0311   WBC 11.28 11.53 10.90   HGB 8.1* 8.0* 7.8*   HCT 25.3* 26.1* 24.7*    227 245       All pertinent labs results from the past 24 hours have been reviewed.    Significant Imaging:  All pertinent imaging results/findings from the past 24 hours have been reviewed.                  Assessment/Plan:     * Ureteral transection of left native kidney  Mariann Huff is a 59 y.o. female s/p left ureteral injury on 4/12, presented with fever, AMS, and intraabdominal abscess, taken for washout and ligation of left ureter with neph tube placement on  4/21, Trach/PEG 5/2.  - on vent currently, tolerating TC during day  - Tube feeds per SICU  - ID on board, appreciate recs:   - continue rifampin; completed cefepime x 7 days, now on Ancef; continue rifampin and Ancef until 6/18/19   - 5/13 UCx growing Candida albicans; 7 day course of diflucan 800 mg   - 5/19 BAL growing Pseudomonas   - ID recommends reimaging patient should she continue to show signs of infection  - Maintain neph tube; remove Fontenot, VT today  - Urine output adequate, Cr stable and WNL  - diuresis/fluids per SICU  - bloody BM - flexible sigmoidoscopy on 5/21/19 showed no signs of bleeding, Colonoscopy showed healing rectal ulcer, EGD not indicated per GI; H&H stable    Dispo: continue ICU care; plan for transfer to LTAC once stable. Holding heparin gtt currently    Sepsis  As above        VTE Risk Mitigation (From admission, onward)        Ordered     IP VTE LOW RISK PATIENT  Once      05/08/19 1315     Place sequential compression device  Until discontinued      04/20/19 1331          Mook Ferguson MD  Urology  [facility]

## 2019-05-28 NOTE — SUBJECTIVE & OBJECTIVE
"Interval HPI:   Overnight events: Remains in SICU. NAEON. BG within goal ranges; not requiring SQ correction scale coverage. Tube feedings on hold at this time for N/V.  Eating:   NPO  Nausea: Yes  Hypoglycemia and intervention: No  Fever: No  TPN and/or TF: Yes (on hold)   If yes, type of TF/TPN and rate: Glucerna 1.5 (goal 45 cc/hr)     BP (!) 168/81 (BP Location: Left arm, Patient Position: Lying)   Pulse 79   Temp 98.5 °F (36.9 °C) (Oral)   Resp 18   Ht 5' 5" (1.651 m)   Wt 77 kg (169 lb 12.1 oz)   SpO2 100%   Breastfeeding? No   BMI 28.25 kg/m²     Labs Reviewed and Include    Recent Labs   Lab 05/28/19  0311   *   CALCIUM 8.9   ALBUMIN 1.5*   PROT 6.1      K 4.1   CO2 22*   *   BUN 29*   CREATININE 1.1   ALKPHOS 113   ALT 5*   AST 16   BILITOT 0.4     Lab Results   Component Value Date    WBC 10.90 05/28/2019    HGB 7.8 (L) 05/28/2019    HCT 24.7 (L) 05/28/2019    MCV 92 05/28/2019     05/28/2019     No results for input(s): TSH, FREET4 in the last 168 hours.  Lab Results   Component Value Date    HGBA1C 5.4 05/24/2019       Nutritional status:   Body mass index is 28.25 kg/m².  Lab Results   Component Value Date    ALBUMIN 1.5 (L) 05/28/2019    ALBUMIN 1.5 (L) 05/27/2019    ALBUMIN 1.6 (L) 05/26/2019     No results found for: PREALBUMIN    Estimated Creatinine Clearance: 57.2 mL/min (based on SCr of 1.1 mg/dL).    Accu-Checks  Recent Labs     05/26/19  1215 05/26/19  1607 05/27/19  0329 05/27/19  0740 05/27/19  1117 05/27/19  1548 05/27/19  1947 05/28/19  0003 05/28/19  0714 05/28/19  1117   POCTGLUCOSE 109 116* 142* 120* 127* 134* 141* 138* 131* 138*       Current Medications and/or Treatments Impacting Glycemic Control  Immunotherapy:    Immunosuppressants     None        Steroids:   Hormones (From admission, onward)    None        Pressors:    Autonomic Drugs (From admission, onward)    None        Hyperglycemia/Diabetes Medications:   Antihyperglycemics (From admission, " onward)    Start     Stop Route Frequency Ordered    05/24/19 183  insulin aspart U-100 pen 0-5 Units      -- SubQ Every 6 hours PRN 05/24/19 2410

## 2019-05-28 NOTE — PROGRESS NOTES
[facility]  Critical Care - Surgery  Progress Note    Patient Name: Mariann Huff  MRN: [id number]  Admission Date: 4/20/2019  Hospital Length of Stay: 38 days  Code Status: Full Code  Attending Provider: Robin Boyd MD  Primary Care Provider: Jasbir Haney MD   Principal Problem: Ureteral transection of left native kidney    Subjective:     Hospital/ICU Course:  The patient has had 2 episodes of bradycardia during the day.  Early in the morning, the patient had difficulty breathing and a mucus plug was found in the inner cannula of the tracheostomy.  She developed bradycardia but never arrested.  She also developed hypotension.  This was followed subsequently after she was bagged (Ambu) with tachypnea and hypertension and tachycardia.  Oxygen saturations were in the low 90s, and the arterial blood gas at that time revealed a significant alveolar arterial gradient with adequate ventilation.  Chest x-ray, which I personally reviewed, revealed what appeared to be a significant fluid overload.  We did perform an echocardiogram urgently, which I personally was present and reviewed the results.  There is no evidence of heart failure.  EKG was reported as normal and there is no evidence that the patient had an acute myocardial event.  The chest x-ray did suggest a significant amount of pulmonary edema. There was also a concern, because the the history of upper body deep venous thrombosis, that this could be a pulmonary embolism.  A CT scan of the chest , which I have personally reviewed show bilateral infiltrates compatible with pulmonary edema of non cardiogenic origin.  It was not possible to rule out a pulmonary embolism.  The patient is anticoagulated currently.  Ultrasound of the lower extremities was done and they do not show any deep venous thrombosis.    The patient did receive some fluids, because of her history of acute kidney injury, which has resolved and because she was receiving IV  contrast.  She has maintained a good urinary output during the day.  She has mostly being hemodynamically stable and her tachycardia resolved.  She remains sedated and on the ventilator.  Follow-up chest x-ray, which I personally reviewed, showed resolving pulmonary edema. Her oxygenation and oxygen saturations improved and we were able to wean the patient FiO2 down to 50%.    Of note, I did perform a bronchoscopy, which failed to reveal any amount of pus or mucus plugging.  There was also no evidence of aspiration.  BAL was performed and was sent.    A 2nd episode of bradycardia was observed in the afternoon.  This responded to the use of atropine and 0.1 mg of epinephrine.  She did have a tachycardic response with this which subsided rapidly.  She remained otherwise hemodynamically stable.  Arterial blood gases, reveal now a PO2 of 300 with adequate pCO2 and pH.  Mild hyperventilation.  She remains comfortable.  I have asked Cardiology to re-evaluate this patient.  For now she remains off pressors.    Neurologically the patient has been intact. She is awake alert and oriented with a nonfocal exam.    Her abdomen remains soft.  She has been NPO for now.  She has not had any further episodes of lower GI bleed.    The patient is having a good urinary output BUN and creatinine have been grossly within normal limits with a slight elevation of BUN.  Electrolytes are being followed and replaced as necessary.    Currently no evidence of infection in this patient.  Empirically I started antibiotics, but will stop them in the next 24-48 hours.    I have had the chance to talk to the patient's  at length and in detail.  I have explained our findings, I will keep him up-to-date as to any progress in understanding the etiology of these episodes and will wait continuing to talk to him as often as necessary.      Interval History/Significant Events: No acute events overnight. H/H 7.8/24.7 from 8.0/26.1 overnight. BUN/Cr  29/1.1 from 25/1.0 overnight.  UOP improved s/p lasix dose. CXR showing increasing edema. No bloody BM's. AFVSS.    Objective:     Vital Signs (Most Recent):  Temp: 98.8 °F (37.1 °C) (05/28/19 0700)  Pulse: 79 (05/28/19 0930)  Resp: 14 (05/28/19 0930)  BP: (!) 162/77 (05/28/19 0900)  SpO2: 100 % (05/28/19 0930) Vital Signs (24h Range):  Temp:  [98 °F (36.7 °C)-98.8 °F (37.1 °C)] 98.8 °F (37.1 °C)  Pulse:  [66-88] 79  Resp:  [10-46] 14  SpO2:  [97 %-100 %] 100 %  BP: (118-180)/() 162/77     Weight: 77 kg (169 lb 12.1 oz)  Body mass index is 28.25 kg/m².      Intake/Output Summary (Last 24 hours) at 5/28/2019 0937  Last data filed at 5/28/2019 0900  Gross per 24 hour   Intake 1647.2 ml   Output 1550 ml   Net 97.2 ml       Physical Exam   Constitutional: She appears well-developed and well-nourished.   HENT:   Head: Normocephalic and atraumatic.   Tracheostomy in place   Eyes: Right eye exhibits no discharge. Left eye exhibits no discharge.   Neck: Normal range of motion. Neck supple.   Cardiovascular: Normal rate and regular rhythm.   Pulmonary/Chest: Effort normal. No respiratory distress.   Abdominal: Soft.   Midline incision c/d/i.  PEG with no leak. Abdomen soft, non-distended.  Bowel sounds present   Genitourinary:   Genitourinary Comments: Nephrostomy tube in place with yellow output.  Fontenot in place draining cloudy yellow urine   Musculoskeletal: Normal range of motion.   Neurological: She is alert.   Able to follow commands, denying pain.    Skin: Skin is warm and dry.   Psychiatric: She has a normal mood and affect.   Nursing note and vitals reviewed.      Vents:  Vent Mode: Spont (05/28/19 0828)  Ventilator Initiated: Yes (05/08/19 0630)  Set Rate: 18 bmp (05/27/19 0305)  Vt Set: 420 mL (05/27/19 0305)  Pressure Support: 15 cmH20 (05/27/19 0830)  PEEP/CPAP: 5 cmH20 (05/28/19 0828)  Oxygen Concentration (%): 40 (05/28/19 0900)  Peak Airway Pressure: 40 cmH2O (05/28/19 0828)  Plateau Pressure: 0 cmH20  (05/28/19 0828)  Total Ve: 3.46 mL (05/28/19 0828)  Negative Inspiratory Force (cm H2O): -18 (04/27/19 1306)  F/VT Ratio<105 (RSBI): (!) 27.48 (05/28/19 0543)    Lines/Drains/Airways     Peripherally Inserted Central Catheter Line                 PICC Double Lumen 05/24/19 0900 right brachial 4 days          Drain                 Nephrostomy 04/21/19 0629 Left 8 Fr. 37 days         Gastrostomy/Enterostomy 05/02/19 1134 Percutaneous endoscopic gastrostomy (PEG) LUQ decompression;feeding 25 days    Female External Urinary Catheter 05/28/19 0830 less than 1 day          Airway                 Surgical Airway 05/02/19 1107 Shiley Cuffed 25 days                Significant Labs:    CBC/Anemia Profile:  Recent Labs   Lab 05/27/19  0330 05/28/19  0311   WBC 11.53 10.90   HGB 8.0* 7.8*   HCT 26.1* 24.7*    245   MCV 93 92   RDW 14.5 14.5        Chemistries:  Recent Labs   Lab 05/26/19  1400 05/27/19  0330 05/28/19  0311    143 144   K 4.4 4.1 4.1   * 111* 112*   CO2 22* 22* 22*   BUN 25* 25* 29*   CREATININE 0.8 1.0 1.1   CALCIUM 8.5* 8.7 8.9   ALBUMIN 1.6* 1.5* 1.5*   PROT 6.1 6.2 6.1   BILITOT 0.5 0.7 0.4   ALKPHOS 120 118 113   ALT 9* 9* 5*   AST 22 22 16   MG  --  2.2 2.1   PHOS  --  3.1 3.4       All pertinent labs within the past 24 hours have been reviewed.    Significant Imaging:  I have reviewed all pertinent imaging results/findings within the past 24 hours.    Assessment/Plan:     * Ureteral transection of left native kidney  Orantoinette Huff is a 59 y.o. female s/p left ureteral injury on 4/12, who presented with fever, AMS, and intraabdominal abscess, taken for washout and ligation of left ureter with nephrostomy tube placement on 4/21. S/p Trach/PEG on 5/2. Episode of negative pressure pulmonary edema on 5/8 requiring mechanical ventilation, diuresis and low dose pressor. Has been plagued by intermittent BRBPR.     Neuro:   - off sedation  - responds to questions appropriately by nodding  -  no focal deficit     Pulmonary:   - CPAP vs trach collar, does not like trach collar per nursing report, will try again today   - ABGs prn  - diuresis as tolerates     Cardiac:   - alternating HTN and hypotension  - TTE 5/8 with no evidence of right heart strain or significant valvular pathology, normal LV systolic function, EF 70%     Renal:    - S/p transection of left ureter 4/12 and subsequent ligation and PCT nephrostomy tube placement with IR 4/21  - Fontenot removed 5/15, but then replaced for retention   - UOP improved s/p lasix 20 mg x1  - Fontenot removal and f/u VT  - BUN/Cr 29/1.1 from 25/1.0 overnight  - Monitor I/Os     ID:   - AF  - WBC stable  - Blood 5/10 NGTD  - Ucx 5/13 candiduria  - BAL 5/18 pseudomonas  - C diff 5/5 negative  - on fluconazole, ancef     Hem/Onc:   - 5/24- 1U PRBC given  - H/H 7.8/24.7 from 8.0/26.1 overnight  - Continue to check CBC daily      Endocrine:  - DM Type 2  - LDSSI  - Endocrine following for assistance with blood glucose control.      Fluids/Electrolytes/Nutrition/GI:   - Free water flushes 300 cc q6h  - Replace electrolytes PRN  - Currently holding TF's (goal 45) - will restart at trickle rate and advance as tolerated once nausea resolves    # Rectal Ulcers  - intermittent hematochezia; ulcers seen to be improving on partial colonoscopy 5/21  - no bleeding in last 24 hours  - anoscopy and hemostatic agent applied by CRS 5/15  - imodium QID  - C scope by CRS 5/24 - no active bleed, healing rectal ulcer      PPx:  - DVT: Lovenox, Hep gtt held for continued bloody BMs     DISPO:  - LTAC planning   - trach collar trials               Critical care was time spent personally by me on the following activities: development of treatment plan with patient or surrogate and bedside caregivers, discussions with consultants, evaluation of patient's response to treatment, examination of patient, ordering and performing treatments and interventions, ordering and review of laboratory  studies, ordering and review of radiographic studies, pulse oximetry, re-evaluation of patient's condition.  This critical care time did not overlap with that of any other provider or involve time for any procedures.     Christian Hyatt MD  Critical Care - Surgery  [facility]

## 2019-05-28 NOTE — SUBJECTIVE & OBJECTIVE
Interval History: No acute events. Tolerated trach collar during the day yesterday, back on vent (CPAP mode) overnight. UOP adequate, Cr stable at 1.1. No bloody BM, H&H stable.     Review of Systems  Objective:     Temp:  [98 °F (36.7 °C)-98.8 °F (37.1 °C)] 98.8 °F (37.1 °C)  Pulse:  [66-88] 66  Resp:  [10-29] 23  SpO2:  [97 %-100 %] 100 %  BP: (118-180)/() 141/68     Body mass index is 28.25 kg/m².    Date 05/28/19 0700 - 05/29/19 0659   Shift 4211-9410 6904-3816 3582-7161 24 Hour Total   INTAKE   NG/GT 30   30   Shift Total(mL/kg) 30(0.4)   30(0.4)   OUTPUT   Urine(mL/kg/hr) 185   185   Shift Total(mL/kg) 185(2.4)   185(2.4)   Weight (kg) 77 77 77 77     Bladder Scan Volume (mL): 27 mL (05/10/19 0846)  Post Void Cath Residual Output (mL): 27 mL (05/10/19 0846)    Drains     Drain                 Nephrostomy 04/21/19 0629 Left 8 Fr. 37 days         Gastrostomy/Enterostomy 05/02/19 1134 Percutaneous endoscopic gastrostomy (PEG) LUQ decompression;feeding 25 days         Urethral Catheter 05/16/19 1040 16 Fr. 11 days                Physical Exam   Constitutional: She appears well-developed. No distress.   HENT:   Head: Normocephalic and atraumatic.   Neck: No JVD present.   Cardiovascular: Normal rate and regular rhythm.    Pulmonary/Chest: Effort normal. No respiratory distress.   On vent   Abdominal: Soft. She exhibits no distension. There is no rebound and no guarding.   Incision c/d/i   G-tube   Genitourinary:   Genitourinary Comments: Fontenot in place draining clear yellow urine  Left neph tube with clear yellow urine   Neurological: She is alert.   Skin: Skin is warm and dry. She is not diaphoretic. No pallor.         Significant Labs:    BMP:  Recent Labs   Lab 05/26/19  1400 05/27/19  0330 05/28/19  0311    143 144   K 4.4 4.1 4.1   * 111* 112*   CO2 22* 22* 22*   BUN 25* 25* 29*   CREATININE 0.8 1.0 1.1   CALCIUM 8.5* 8.7 8.9       CBC:   Recent Labs   Lab 05/26/19  0300 05/27/19  3661  05/28/19  0311   WBC 11.28 11.53 10.90   HGB 8.1* 8.0* 7.8*   HCT 25.3* 26.1* 24.7*    227 245       All pertinent labs results from the past 24 hours have been reviewed.    Significant Imaging:  All pertinent imaging results/findings from the past 24 hours have been reviewed.

## 2019-05-28 NOTE — PROGRESS NOTES
Pt had small amount of tube feed colored emesis. MD notified. Orders to pause tube feeds. PRN zofran given. VSS. Will continue to monitor.

## 2019-05-28 NOTE — PLAN OF CARE
05/28/19 1501   Post-Acute Status   Post-Acute Authorization Placement   Post-Acute Placement Status Pending Payor Review     SW was informed by the CM that [facility] had to resubmit for insurance auth as the pt was not medically stable when it was first submitted.  Pt is ready to transfer to [facility] once auth has been obtained.  NICK/CM will f/u as needed.    Ирина Godinez, FRED x [phone]

## 2019-05-28 NOTE — ASSESSMENT & PLAN NOTE
Mariann Huff is a 59 y.o. female s/p left ureteral injury on 4/12, presented with fever, AMS, and intraabdominal abscess, taken for washout and ligation of left ureter with neph tube placement on 4/21, Trach/PEG 5/2.  - on vent currently, tolerating TC during day  - Tube feeds per SICU  - ID on board, appreciate recs:   - continue rifampin; completed cefepime x 7 days, now on Ancef; continue rifampin and Ancef until 6/18/19   - 5/13 UCx growing Candida albicans; 7 day course of diflucan 800 mg   - 5/19 BAL growing Pseudomonas   - ID recommends reimaging patient should she continue to show signs of infection  - Maintain neph tube; remove Fontenot, VT today  - Urine output adequate, Cr stable and WNL  - diuresis/fluids per SICU  - bloody BM - flexible sigmoidoscopy on 5/21/19 showed no signs of bleeding, Colonoscopy showed healing rectal ulcer, EGD not indicated per GI; H&H stable    Dispo: continue ICU care; plan for transfer to LTAC once stable. Holding heparin gtt currently

## 2019-05-28 NOTE — PLAN OF CARE
Problem: Occupational Therapy Goal  Goal: Occupational Therapy Goal  Goals to be met by: 6/5/19     Patient will increase functional independence with ADLs by performing:    UE Dressing with Set-up Assistance.  LE Dressing with Maximum Assistance and Assistive Devices as needed.  Grooming while standing with Minimal Assistance.  Toileting from bedside commode with Minimal Assistance for hygiene and clothing management.   Sitting at edge of bed x10 minutes with Supervision.  Rolling to Bilateral with Minimal Assistance.   Supine to sit with Minimal Assistance.  Toilet transfer to bedside commode with Minimal Assistance.      Outcome: Ongoing (interventions implemented as appropriate)  The above goals remain appropriate. PRIMITIVO Dash  5/28/2019

## 2019-05-29 LAB
ANION GAP SERPL CALC-SCNC: 9 MMOL/L (ref 8–16)
BASOPHILS # BLD AUTO: 0.05 K/UL (ref 0–0.2)
BASOPHILS NFR BLD: 0.4 % (ref 0–1.9)
BUN SERPL-MCNC: 28 MG/DL (ref 6–20)
CALCIUM SERPL-MCNC: 9 MG/DL (ref 8.7–10.5)
CHLORIDE SERPL-SCNC: 110 MMOL/L (ref 95–110)
CO2 SERPL-SCNC: 25 MMOL/L (ref 23–29)
CREAT SERPL-MCNC: 1.2 MG/DL (ref 0.5–1.4)
DIFFERENTIAL METHOD: ABNORMAL
EOSINOPHIL # BLD AUTO: 1.3 K/UL (ref 0–0.5)
EOSINOPHIL NFR BLD: 11.4 % (ref 0–8)
ERYTHROCYTE [DISTWIDTH] IN BLOOD BY AUTOMATED COUNT: 14.3 % (ref 11.5–14.5)
EST. GFR  (AFRICAN AMERICAN): 57.6 ML/MIN/1.73 M^2
EST. GFR  (NON AFRICAN AMERICAN): 49.9 ML/MIN/1.73 M^2
GLUCOSE SERPL-MCNC: 101 MG/DL (ref 70–110)
HCT VFR BLD AUTO: 27.1 % (ref 37–48.5)
HGB BLD-MCNC: 8.2 G/DL (ref 12–16)
IMM GRANULOCYTES # BLD AUTO: 0.05 K/UL (ref 0–0.04)
IMM GRANULOCYTES NFR BLD AUTO: 0.4 % (ref 0–0.5)
LYMPHOCYTES # BLD AUTO: 1.4 K/UL (ref 1–4.8)
LYMPHOCYTES NFR BLD: 12.5 % (ref 18–48)
MCH RBC QN AUTO: 28.9 PG (ref 27–31)
MCHC RBC AUTO-ENTMCNC: 30.3 G/DL (ref 32–36)
MCV RBC AUTO: 95 FL (ref 82–98)
MONOCYTES # BLD AUTO: 0.9 K/UL (ref 0.3–1)
MONOCYTES NFR BLD: 7.5 % (ref 4–15)
NEUTROPHILS # BLD AUTO: 7.6 K/UL (ref 1.8–7.7)
NEUTROPHILS NFR BLD: 67.8 % (ref 38–73)
NRBC BLD-RTO: 0 /100 WBC
PLATELET # BLD AUTO: 272 K/UL (ref 150–350)
PMV BLD AUTO: 11.1 FL (ref 9.2–12.9)
POCT GLUCOSE: 101 MG/DL (ref 70–110)
POCT GLUCOSE: 107 MG/DL (ref 70–110)
POCT GLUCOSE: 107 MG/DL (ref 70–110)
POCT GLUCOSE: 108 MG/DL (ref 70–110)
POCT GLUCOSE: 113 MG/DL (ref 70–110)
POCT GLUCOSE: 70 MG/DL (ref 70–110)
POCT GLUCOSE: 81 MG/DL (ref 70–110)
POTASSIUM SERPL-SCNC: 4 MMOL/L (ref 3.5–5.1)
RBC # BLD AUTO: 2.84 M/UL (ref 4–5.4)
SODIUM SERPL-SCNC: 144 MMOL/L (ref 136–145)
WBC # BLD AUTO: 11.26 K/UL (ref 3.9–12.7)

## 2019-05-29 PROCEDURE — 20000000 HC ICU ROOM

## 2019-05-29 PROCEDURE — 25000003 PHARM REV CODE 250: Performed by: STUDENT IN AN ORGANIZED HEALTH CARE EDUCATION/TRAINING PROGRAM

## 2019-05-29 PROCEDURE — 85025 COMPLETE CBC W/AUTO DIFF WBC: CPT

## 2019-05-29 PROCEDURE — 99232 PR SUBSEQUENT HOSPITAL CARE,LEVL II: ICD-10-PCS | Mod: ,,, | Performed by: NURSE PRACTITIONER

## 2019-05-29 PROCEDURE — 63600175 PHARM REV CODE 636 W HCPCS: Performed by: STUDENT IN AN ORGANIZED HEALTH CARE EDUCATION/TRAINING PROGRAM

## 2019-05-29 PROCEDURE — 99900035 HC TECH TIME PER 15 MIN (STAT)

## 2019-05-29 PROCEDURE — 25500020 PHARM REV CODE 255: Performed by: UROLOGY

## 2019-05-29 PROCEDURE — 99291 PR CRITICAL CARE, E/M 30-74 MINUTES: ICD-10-PCS | Mod: 24,GC,, | Performed by: SURGERY

## 2019-05-29 PROCEDURE — 94003 VENT MGMT INPAT SUBQ DAY: CPT

## 2019-05-29 PROCEDURE — 99232 SBSQ HOSP IP/OBS MODERATE 35: CPT | Mod: ,,, | Performed by: NURSE PRACTITIONER

## 2019-05-29 PROCEDURE — 94761 N-INVAS EAR/PLS OXIMETRY MLT: CPT

## 2019-05-29 PROCEDURE — 27000221 HC OXYGEN, UP TO 24 HOURS

## 2019-05-29 PROCEDURE — 97116 GAIT TRAINING THERAPY: CPT

## 2019-05-29 PROCEDURE — 99900026 HC AIRWAY MAINTENANCE (STAT)

## 2019-05-29 PROCEDURE — 80048 BASIC METABOLIC PNL TOTAL CA: CPT

## 2019-05-29 PROCEDURE — 99291 CRITICAL CARE FIRST HOUR: CPT | Mod: 24,GC,, | Performed by: SURGERY

## 2019-05-29 PROCEDURE — 27200966 HC CLOSED SUCTION SYSTEM

## 2019-05-29 PROCEDURE — 97530 THERAPEUTIC ACTIVITIES: CPT

## 2019-05-29 RX ORDER — AMLODIPINE BESYLATE 10 MG/1
10 TABLET ORAL DAILY
Status: DISCONTINUED | OUTPATIENT
Start: 2019-05-29 | End: 2019-06-05 | Stop reason: HOSPADM

## 2019-05-29 RX ORDER — OXYCODONE HYDROCHLORIDE 10 MG/1
10 TABLET ORAL EVERY 6 HOURS PRN
Status: DISCONTINUED | OUTPATIENT
Start: 2019-05-29 | End: 2019-06-05 | Stop reason: HOSPADM

## 2019-05-29 RX ORDER — FUROSEMIDE 10 MG/ML
20 INJECTION INTRAMUSCULAR; INTRAVENOUS ONCE
Status: COMPLETED | OUTPATIENT
Start: 2019-05-29 | End: 2019-05-29

## 2019-05-29 RX ORDER — AMLODIPINE BESYLATE 10 MG/1
10 TABLET ORAL DAILY
Status: DISCONTINUED | OUTPATIENT
Start: 2019-05-29 | End: 2019-05-29

## 2019-05-29 RX ADMIN — HYDRALAZINE HYDROCHLORIDE 10 MG: 20 INJECTION INTRAMUSCULAR; INTRAVENOUS at 09:05

## 2019-05-29 RX ADMIN — SODIUM CHLORIDE, SODIUM LACTATE, POTASSIUM CHLORIDE, AND CALCIUM CHLORIDE 500 ML: .6; .31; .03; .02 INJECTION, SOLUTION INTRAVENOUS at 03:05

## 2019-05-29 RX ADMIN — FLUCONAZOLE 800 MG: 200 TABLET ORAL at 08:05

## 2019-05-29 RX ADMIN — FUROSEMIDE 20 MG: 10 INJECTION, SOLUTION INTRAMUSCULAR; INTRAVENOUS at 09:05

## 2019-05-29 RX ADMIN — HYDROMORPHONE HYDROCHLORIDE 1 MG: 1 INJECTION, SOLUTION INTRAMUSCULAR; INTRAVENOUS; SUBCUTANEOUS at 04:05

## 2019-05-29 RX ADMIN — SCOPALAMINE 1 PATCH: 1 PATCH, EXTENDED RELEASE TRANSDERMAL at 08:05

## 2019-05-29 RX ADMIN — RIFAMPIN 300 MG: 600 INJECTION, POWDER, LYOPHILIZED, FOR SOLUTION INTRAVENOUS at 03:05

## 2019-05-29 RX ADMIN — CEFAZOLIN 2 G: 1 INJECTION, POWDER, FOR SOLUTION INTRAMUSCULAR; INTRAVENOUS at 02:05

## 2019-05-29 RX ADMIN — AMLODIPINE BESYLATE 10 MG: 10 TABLET ORAL at 12:05

## 2019-05-29 RX ADMIN — CHLORHEXIDINE GLUCONATE 0.12% ORAL RINSE 15 ML: 1.2 LIQUID ORAL at 09:05

## 2019-05-29 RX ADMIN — SILODOSIN 4 MG: 4 CAPSULE ORAL at 08:05

## 2019-05-29 RX ADMIN — IOHEXOL 25 ML: 300 INJECTION, SOLUTION INTRAVENOUS at 05:05

## 2019-05-29 RX ADMIN — CEFAZOLIN 2 G: 1 INJECTION, POWDER, FOR SOLUTION INTRAMUSCULAR; INTRAVENOUS at 05:05

## 2019-05-29 RX ADMIN — CHLORHEXIDINE GLUCONATE 0.12% ORAL RINSE 15 ML: 1.2 LIQUID ORAL at 08:05

## 2019-05-29 RX ADMIN — PANTOPRAZOLE SODIUM 40 MG: 40 GRANULE, DELAYED RELEASE ORAL at 08:05

## 2019-05-29 RX ADMIN — ONDANSETRON 4 MG: 2 INJECTION INTRAMUSCULAR; INTRAVENOUS at 02:05

## 2019-05-29 RX ADMIN — MICONAZOLE NITRATE: 20 OINTMENT TOPICAL at 09:05

## 2019-05-29 RX ADMIN — PROMETHAZINE HYDROCHLORIDE 12.5 MG: 25 INJECTION INTRAMUSCULAR; INTRAVENOUS at 10:05

## 2019-05-29 RX ADMIN — MICONAZOLE NITRATE: 20 OINTMENT TOPICAL at 08:05

## 2019-05-29 RX ADMIN — CEFAZOLIN 2 G: 1 INJECTION, POWDER, FOR SOLUTION INTRAMUSCULAR; INTRAVENOUS at 09:05

## 2019-05-29 RX ADMIN — OXYCODONE HYDROCHLORIDE 5 MG: 5 TABLET ORAL at 08:05

## 2019-05-29 NOTE — PROGRESS NOTES
Wound care follow up:  Incisions to back improving. Moisture-associated skin breakdown to sacrum improving.  Will continue with wound care orders in place. Wound care team to follow prn  [phone]     05/29/19 1238        Incision/Site 05/02/19 1142 Left Back   Date First Assessed/Time First Assessed: 05/02/19 1142   Side: Left  Location: (c) Back   Wound Image    Incision WDL ex   Dressing Appearance Intact   Drainage Amount Scant   Drainage Characteristics/Odor Serosanguineous   Appearance Pink;Slough   Red (%), Wound Tissue Color 20 %   Yellow (%), Wound Tissue Color 80 %   Periwound Area Intact   Wound Edges Open   Wound Length (cm) 1 cm   Wound Width (cm) 0.4 cm   Wound Depth (cm) 0.1 cm   Wound Volume (cm^3) 0.04 cm^3   Wound Surface Area (cm^2) 0.4 cm^2   Care Cleansed with:;Sterile normal saline   Dressing Applied;Changed;Removed;Honey;Foam   Dressing Change Due 05/29/19        Incision/Site 05/13/19 Right Back   Date First Assessed: 05/13/19   Side: Right  Location: Back   Wound Image    Incision WDL ex   Dressing Appearance Intact   Drainage Amount Scant   Drainage Characteristics/Odor Serosanguineous   Appearance Pink;Slough   Yellow (%), Wound Tissue Color 100 %   Periwound Area Intact   Wound Edges Open   Wound Length (cm) 1.3 cm   Wound Width (cm) 0.6 cm   Wound Depth (cm) 0.2 cm   Wound Volume (cm^3) 0.16 cm^3   Wound Surface Area (cm^2) 0.78 cm^2   Care Cleansed with:;Sterile normal saline   Dressing Removed;Applied;Changed;Honey;Foam   Dressing Change Due 05/30/19        Wound 05/03/19 2000 Moisture associated dermatitis lower Buttocks   Date First Assessed/Time First Assessed: 05/03/19 2000   Primary Wound Type: Moisture associated dermatitis  Side: Left  Orientation: lower  Location: Buttocks   Wound Image    Wound WDL ex   Dressing Appearance Open to air   Drainage Amount None   Appearance Moist   Care Cleansed with:;Soap and water;Applied:;Skin Barrier  (antifungal)

## 2019-05-29 NOTE — PLAN OF CARE
05/29/19 1608   Discharge Assessment   Assessment Type Discharge Planning Reassessment   Discharge Plan A Long-term acute care facility (LTAC)   Discharge Plan B Rehab   Patient/Family in Agreement with Plan yes     Peer to Peer Completed by Dr. Mook granados/Medical Director for [facility] Dr. Clements @ 3:00pm. The Medical Director wants to have 2 failed trach collar trials without requiring ventilator assistance to approved LTAC. If the patient passes the trials then Skilled level of care will be approved. PT is recommending IRF at this time. Discussed with Dr. Ferguson. [facility] informed. The patient is not medically ready for LTAC. Will have IVC filter placed for LE DVT. Will continue to follow and help with discharge planning.

## 2019-05-29 NOTE — PROGRESS NOTES
Pt transitioned from ventilator to 10 L 40% trach collar, SpO2 remains 100%. MD approved. Pt tolerating well. Will continue to monitor closely.

## 2019-05-29 NOTE — ASSESSMENT & PLAN NOTE
Mariann Huff is a 59 y.o. female s/p left ureteral injury on 4/12, presented with fever, AMS, and intraabdominal abscess, taken for washout and ligation of left ureter with neph tube placement on 4/21, Trach/PEG 5/2.  - on vent currently, tolerating TC during day  - Tube feeds per SICU  - ID on board, appreciate recs:   - continue rifampin; completed cefepime x 7 days, now on Ancef; continue rifampin and Ancef until 6/18/19   - 5/13 UCx growing Candida albicans; 7 day course of diflucan 800 mg   - 5/19 BAL growing Pseudomonas   - ID recommends reimaging patient should she continue to show signs of infection  - Maintain neph tube and correa; patient failed VT on 5/28/19  - Urine output adequate, Cr stable and WNL  - diuresis/fluids per SICU  - bloody BM - flexible sigmoidoscopy on 5/21/19 showed no signs of bleeding, Colonoscopy showed healing rectal ulcer, EGD not indicated per GI; H&H stable    Dispo: continue ICU care; plan for transfer to LTAC once approved by insurance. Holding heparin gtt currently

## 2019-05-29 NOTE — SUBJECTIVE & OBJECTIVE
Interval History: No acute events. Failed VT, Fontenot reinserted. Tolerated trach collar during day, off at night. 450 cc UOP afterwards from Fontenot, 100 cc/24h from neph tube. Cr pending.     Review of Systems  Objective:     Temp:  [98.3 °F (36.8 °C)-98.8 °F (37.1 °C)] 98.4 °F (36.9 °C)  Pulse:  [67-96] 71  Resp:  [0-46] 21  SpO2:  [99 %-100 %] 100 %  BP: (160-187)/(76-87) 183/85     Body mass index is 28.25 kg/m².    Date 05/29/19 0700 - 05/30/19 0659   Shift 3424-2987 9089-5693 3174-5012 24 Hour Total   INTAKE   NG/GT 55   55   Shift Total(mL/kg) 55(0.7)   55(0.7)   OUTPUT   Urine(mL/kg/hr) 55   55   Shift Total(mL/kg) 55(0.7)   55(0.7)   Weight (kg) 77 77 77 77     Bladder Scan Volume (mL): 27 mL (05/10/19 0846)  Post Void Cath Residual Output (mL): 27 mL (05/10/19 0846)    Drains     Drain                 Nephrostomy 04/21/19 0629 Left 8 Fr. 38 days         Gastrostomy/Enterostomy 05/02/19 1134 Percutaneous endoscopic gastrostomy (PEG) LUQ decompression;feeding 26 days         Urethral Catheter 05/29/19 0151 less than 1 day                Physical Exam   Constitutional: She appears well-developed. No distress.   HENT:   Head: Normocephalic and atraumatic.   Neck: No JVD present.   Cardiovascular: Normal rate and regular rhythm.    Pulmonary/Chest: Effort normal. No respiratory distress.   On vent   Abdominal: Soft. She exhibits no distension. There is no rebound and no guarding.   Incision c/d/i   G-tube   Genitourinary:   Genitourinary Comments: Fontenot in place draining clear yellow urine  Left neph tube with clear yellow urine   Neurological: She is alert.   Skin: Skin is warm and dry. She is not diaphoretic. No pallor.         Significant Labs:    BMP:  Recent Labs   Lab 05/26/19  1400 05/27/19  0330 05/28/19  0311    143 144   K 4.4 4.1 4.1   * 111* 112*   CO2 22* 22* 22*   BUN 25* 25* 29*   CREATININE 0.8 1.0 1.1   CALCIUM 8.5* 8.7 8.9       CBC:   Recent Labs   Lab 05/27/19  0330 05/28/19 0311  05/29/19  0321   WBC 11.53 10.90 11.26   HGB 8.0* 7.8* 8.2*   HCT 26.1* 24.7* 27.1*    245 272       Significant Imaging:  All pertinent imaging results/findings from the past 24 hours have been reviewed.

## 2019-05-29 NOTE — PROGRESS NOTES
Pt IVC filter placement procedure complete. Incision site right neck and dressing CDI. Report given to RN [id number] at bedside . Pt transported via bed with RN [id number] and PCT to room 16049.per unit and hospital protocol

## 2019-05-29 NOTE — PT/OT/SLP PROGRESS
Physical Therapy Treatment    Patient Name:  Mariann Huff   MRN:  [id number]    Recommendations:     Discharge Recommendations:  rehabilitation facility   Discharge Equipment Recommendations: (TBD as pt progresses)   Barriers to discharge: Inaccessible home and Decreased caregiver support 1 DAVID and  works    Assessment:     Mariann Huff is a 59 y.o. female admitted with a medical diagnosis of Ureteral transection of left native kidney.  She presents with the following impairments/functional limitations:  weakness, gait instability, impaired endurance, impaired balance, impaired functional mobilty, pain, impaired cardiopulmonary response to activity, decreased lower extremity function. Pt is motivated to progress with transfers and gait, limited by fatigue and pain in her L hip.    Rehab Prognosis: Good; patient would benefit from acute skilled PT services to address these deficits and reach maximum level of function.    Recent Surgery: Procedure(s) (LRB):  COLONOSCOPY (N/A) 5 Days Post-Op    Plan:     During this hospitalization, patient to be seen 4 x/week to address the identified rehab impairments via gait training, therapeutic activities, therapeutic exercises, neuromuscular re-education and progress toward the following goals:    · Plan of Care Expires:  06/03/19(second sign orders placed 5/29/19 to request new orders)    Subjective   Pt rubbing her L hip throughout treatment and moving her L LE forward/back to reposition.  Pain/Comfort:  · Pain Rating 1: 10/10  · Location - Side 1: Left  · Location - Orientation 1: generalized  · Location 1: hip  · Pain Addressed 1: Reposition, Cessation of Activity, Nurse notified  · Pain Rating Post-Intervention 1: 10/10      Objective:     Communicated with nurse prior to session.  Patient found supine with blood pressure cuff, pulse ox (continuous), telemetry, ventilator, tracheostomy, PEG Tube, PICC line, correa catheter upon PT entry to room.     General  Precautions: Standard, fall   Orthopedic Precautions:N/A   Braces: LSO     Functional Mobility:  · Bed Mobility:     · Rolling Right: moderate assistance  · Supine to Sit: moderate assistance  · Sit to Supine: moderate assistance  · Transfers:     · Sit to Stand:  moderate assistance with rolling walker x 4 trials  · Gait: 4 sidesteps x 3 trials then 2 steps forward/back with RW with moderate assist +1 person to monitor lines and wires during mobility. pt with difficulty clearing her feet and required manual cues to direct the walker. Limited gait distance due to lines/ventilator and fatigue.    AM-PAC 6 CLICK MOBILITY  Turning over in bed (including adjusting bedclothes, sheets and blankets)?: 3  Sitting down on and standing up from a chair with arms (e.g., wheelchair, bedside commode, etc.): 2  Moving from lying on back to sitting on the side of the bed?: 2  Moving to and from a bed to a chair (including a wheelchair)?: 2  Need to walk in hospital room?: 2  Climbing 3-5 steps with a railing?: 1  Basic Mobility Total Score: 12     Therapeutic Activities and Exercises:   Pt sat on the EOB ~ 18 min with SBA one positioned on the EOB between standing trials. Pt required minimal assist to scoot back on the bed in sitting when pt began to slide forward.     Patient left supine with all lines intact, call button in reach and nurse notified..    GOALS:   Multidisciplinary Problems     Physical Therapy Goals        Problem: Physical Therapy Goal    Goal Priority Disciplines Outcome Goal Variances Interventions   Physical Therapy Goal     PT, PT/OT Ongoing (interventions implemented as appropriate)     Description:  Goals to be met by:19    Patient will increase functional independence with mobility by performin. Supine to sit with Moderate Assistance - met  Revised goal: supine to sit through sidelying with minimal assist-not met  2. Sit to stand transfer with Minimal Assistance -not met  3. Gait  x 30 feet with  Contact Guard Assistance using Rolling Walker. -not met  4. Lower extremity exercise program x15 reps , with assistance as needed- met 5/20                        Time Tracking:     PT Received On: 05/29/19  PT Start Time: 0828     PT Stop Time: 0852  PT Total Time (min): 24 min     Billable Minutes: Gait Training 14 and Therapeutic Activity 10    Treatment Type: Treatment  PT/PTA: PT     PTA Visit Number: 0     Anastacia Liriano, PT  05/29/2019

## 2019-05-29 NOTE — PLAN OF CARE
"Problem: Adult Inpatient Plan of Care  Goal: Plan of Care Review  Outcome: Ongoing (interventions implemented as appropriate)  Dx: Ureteral transection of left native kidney    Shift Events: VSS; pt on vent settings: Spontaneous, FiO2 40%, PEEP 5 with sats 100%. 500cc LR given for low UOP    Neuro: AAO x4, Arouses to Voice, Follows Commands and Moves All Extremities    Vital Signs: BP (!) 168/78 (BP Location: Left arm, Patient Position: Lying)   Pulse 76   Temp 98.8 °F (37.1 °C) (Oral)   Resp (!) 21   Ht 5' 5" (1.651 m)   Wt 77 kg (169 lb 12.1 oz)   SpO2 100%   Breastfeeding? No   BMI 28.25 kg/m²     Diet: NPO and Tube Feeds    Gtts: no gtts    Urine Output: Urinary Catheter 450 cc/shift    Drains: nephrostomy output 10cc/shift     Labs/Accuchecks: Daily CBC; Q4 accuchecks    Skin: CDI; heels, sacrum and elbows without new breakdown. Wound care done per orders.         "

## 2019-05-29 NOTE — H&P
Inpatient Radiology Pre-procedure Note    History of Present Illness:  Mariann Huff is a 59 y.o. female who presents for IVC filter placement after being found to have DVT in Right common femoral and proximal femoral veins. She has history significant for bilateral upper extremity DVTs while on anticoagulation and GIB.     Admission H&P reviewed.  Past Medical History:   Diagnosis Date    Anticoagulant long-term use     Asthma     Back pain     Bradycardia, unspecified 2019    The etiology of the bradycardic episode is unclear.  The have appear to be respiratory in origin (at least the 1st episode).  We will continue to monitor carefully.  We are awaiting evaluation by Cardiology.      CAD (coronary artery disease)     s/p stentimg  (2), (1)    Carotid artery stenosis     Diabetes mellitus type 2 in obese     HTN (hypertension), benign     Hyperlipidemia     Keloid cicatrix     NPDR (nonproliferative diabetic retinopathy) 2015    NSTEMI (non-ST elevated myocardial infarction)     Nuclear sclerosis - Right Eye 3/18/2014    Primary localized osteoarthrosis, lower leg 2014    Sleep apnea      Past Surgical History:   Procedure Laterality Date    BRONCHOSCOPY N/A 2019    Performed by Sean Ruano MD at [facility] OR 2ND FLR    CARDIAC CATHETERIZATION      cataract extraction left eye      cataracts      CATHETERIZATION, HEART, LEFT Left 2014    Performed by Wilman Kim MD at [facility] CATH LAB     SECTION, LOW TRANSVERSE      COLONOSCOPY N/A 2019    Performed by Al Alaniz MD at [facility] ENDO (2ND FLR)    CORONARY ANGIOPLASTY      Creation, Nephrostomy, Percutaneous Left 2019    Performed by Karina Surgeon at [facility] KARINA    CREATION, TRACHEOSTOMY N/A 2019    Performed by Sean Ruano MD at [facility] OR 2ND FLR    EGD, WITH PEG TUBE INSERTION  2019    [facility]     ESOPHAGOGASTRODUODENOSCOPY (EGD) N/A 8/12/2016    Performed by Gardenia Adamson MD at [facility] ENDO (4TH FLR)    EXCISION TURBINATE, SUBMUCOUS      FUSION, SPINE, LUMBAR, ANTERIOR APPROACH Left L5-S1 Anterior to Psoa Interbody Fusion, L5-S1 Posterior Instrumentation Left 4/12/2019    Performed by Mk George MD at [facility] OR 2ND FLR    HAND SURGERY Left     HAND SURGERY Right     torn ligament repair/ Dr. Yeboah    HYSTERECTOMY      Injection,steroid,epidural,transforaminal approach - Bilateral - S1 Bilateral 9/25/2018    Performed by [facility]l [facility]u [facility] at [facility] PAIN MGT    Injection-steroid-epidural-caudal N/A 2/7/2019    Performed by Dave Bentley Jr., MD at [facility] PAIN MGT    INSERTION-INTRAOCULAR LENS (IOL) Right 9/1/2015    Performed by Good Domingo MD at [facility] OR 1ST FLR    LAPAROTOMY, EXPLORATORY; LIGATION OF LEFT URETER; DOUBLE J STENT REMOVAL LEFT URETER Left 4/20/2019    Performed by Paul Carlson MD at [facility] OR 2ND FLR    left foot surgery      left wrist surgery      NASAL SEPTUM SURGERY  5/7/15    PHACOEMULSIFICATION-ASPIRATION-CATARACT Right 9/1/2015    Performed by Good Domingo MD at [facility] OR 1ST FLR    REPAIR, URETER  4/12/2019    Performed by Mk George MD at [facility] OR 2ND FLR    RESECTION-TURBINATES (SMR) N/A 5/7/2015    Performed by Dileep Dubois III, MD at [facility] OR 2ND FLR    rt elbow surgery      S/P LAD COATED STENT  05/14/2010    6 total     S/P OM1 STENT  08/2003    SEPTOPLASTY N/A 5/7/2015    Performed by Dileep Dubois III, MD at [facility] OR 2ND FLR    SIGMOIDOSCOPY, FLEXIBLE N/A 5/21/2019    Performed by ALBERTO Amin MD at [facility] ENDO (2ND FLR)    SIGMOIDOSCOPY, FLEXIBLE N/A 5/13/2019    Performed by ALBERTO Amin MD at [facility] ENDO (2ND FLR)    SINUS SURGERY      F.E.S.S.    SINUS SURGERY FUNCTIONAL ENDOSCOPIC WITH NAVIGATION WITH MAXILLARIES, ETHMOIDS, LEFT SPHENOID, LEFT LOLY N/A 5/7/2015    Performed by Dileep Dubois III, MD at  "[facility] OR 2ND FLR    STENT, URETERAL placement  4/12/2019    Performed by Robin Boyd MD at [facility] OR 2ND FLR    TUBAL LIGATION         Review of Systems:   As documented in primary team H&P    Home Meds:   Prior to Admission medications    Medication Sig Start Date End Date Taking? Authorizing Provider   albuterol (PROVENTIL/VENTOLIN HFA) 90 mcg/actuation inhaler Inhale 2 puffs into the lungs every 6 (six) hours as needed for Wheezing. 11/30/18  Yes Jasbir Haney MD   amLODIPine (NORVASC) 10 MG tablet TAKE 1 TABLET (10 MG TOTAL) BY MOUTH ONCE DAILY. 8/15/18  Yes Adelaide Aguilar MD   aspirin 81 mg Tab Take 81 mg by mouth. 1 Tablet Oral Every day   Yes Historical Provider, MD   atorvastatin (LIPITOR) 40 MG tablet TAKE 1 TABLET (40 MG TOTAL) BY MOUTH ONCE DAILY. 7/7/17  Yes Adelaide Aguilar MD   ACCU-CHEK EDIN PLUS METER Misc  9/23/14   Historical Provider, MD   BD INSULIN PEN NEEDLE UF MINI 31 x 3/16 " Ndle USE 4 TIMES A DAY 10/4/14   Rosmery Fisher NP   blood sugar diagnostic (ACCU-CHEK EDIN PLUS TEST STRP) Strp TEST BLOOD SUGARS 4 TIMES DAILY 8/31/15   Rosmery Fisher NP   chlorthalidone (HYGROTEN) 50 MG Tab Take 1 tablet (50 mg total) by mouth once daily. 12/7/18   Jasbir Haney MD   esomeprazole (NEXIUM) 40 MG capsule TAKE 1 CAPSULE (40 MG TOTAL) BY MOUTH BEFORE BREAKFAST. 12/6/18   Jasbir Haney MD   fenofibrate micronized (LOFIBRA) 134 MG Cap TAKE 1 CAPSULE (134 MG TOTAL) BY MOUTH BEFORE BREAKFAST. 12/7/18   Jasbir Haney MD   flash glucose scanning reader (FREESTYLE MISTI 14 DAY READER) Misc 1 each by Misc.(Non-Drug; Combo Route) route once daily. 12/7/18   Jasbir Haney MD   flash glucose sensor (FREESTYLE MISTI 14 DAY SENSOR) Kit 1 each by Misc.(Non-Drug; Combo Route) route once daily. 12/7/18   Jasbir Haney MD   gabapentin (NEURONTIN) 100 MG capsule Take 2 capsules (200 mg total) by mouth every evening. 7/23/18 7/23/19  Jasbir Haney MD   insulin aspart U-100 (NOVOLOG FLEXPEN U-100 INSULIN) 100 unit/mL InPn pen " "INJECT 35 U AM, 45 U AT NOON, 35 U PM.150-200 +2, 201-250 +4, 251-300 +6, 301-350 +8, >350 +10 12/7/18   Jasbir Haney MD   insulin glargine, TOUJEO, (TOUJEO SOLOSTAR U-300 INSULIN) 300 unit/mL (1.5 mL) InPn pen Inject 50 Units into the skin 2 (two) times daily. 10/26/18   SEBASTIAN Stone,ANP-C   INVOKANA 300 mg Tab tablet Take 1 tablet (300 mg total) by mouth once daily. 10/26/18   SEBASTIAN Stone,ANP-C   irbesartan (AVAPRO) 300 MG tablet Take 1 tablet (300 mg total) by mouth every evening. 11/7/18 11/7/19  Jasbir Haney MD   lancets 30 gauge Misc  1/9/17   Historical Provider, MD   metoprolol succinate (TOPROL-XL) 100 MG 24 hr tablet TAKE 1 TABLET (100 MG TOTAL) BY MOUTH ONCE DAILY. 1/10/19   Jasbir Haney MD   nitroGLYCERIN (NITROSTAT) 0.4 MG SL tablet Take one every 2-3 min till chestpain relief for 3 times and if still no relief, call MD or come to ED 1/6/17   Jasbir Haney MD   omega-3 acid ethyl esters (LOVAZA) 1 gram capsule Take 1 g by mouth once daily.  11/10/14   Historical Provider, MD   pen needle, diabetic (BD ULTRA-FINE GRABIEL PEN NEEDLES) 32 gauge x 5/32" Ndle For use with insulin pens daily 4 times a day 3/10/16   Jasbir Haney MD   prasugrel (EFFIENT) 10 mg Tab TAKE 1 TABLET (10 MG TOTAL) BY MOUTH ONCE DAILY. 12/6/18   Jasbir Haney MD   tamsulosin (FLOMAX) 0.4 mg Cap Take 1 capsule (0.4 mg total) by mouth every evening. 4/16/19   Robin Boyd MD   tramadol (ULTRAM) 50 mg tablet Take 1 tablet (50 mg total) by mouth 3 (three) times daily as needed for Pain. 10/24/16   Jasbir Haney MD   TRULICITY 1.5 mg/0.5 mL PnIj INJECT 1.5 MG INTO THE SKIN EVERY 7 DAYS. 3/26/19   Juan Urbina MD   zolpidem (AMBIEN) 5 MG Tab Take 1 tablet (5 mg total) by mouth nightly as needed. 12/7/18   Jasbir Haney MD     Scheduled Meds:    amLODIPine  10 mg Per G Tube Daily    ceFAZolin (ANCEF) IVPB  2 g Intravenous Q8H    chlorhexidine  15 mL Mouth/Throat BID    fluconazole  800 mg Per G Tube Daily    miconazole nitrate 2%   Topical (Top) " BID    pantoprazole  40 mg Per G Tube Daily    rifAMpin (RIFADIN) IVPB  300 mg Intravenous Q12H    scopolamine  1 patch Transdermal Q3 Days     Continuous Infusions:   PRN Meds:sodium chloride, acetaminophen, albuterol-ipratropium, dextrose 50%, glucagon (human recombinant), hydrALAZINE, HYDROmorphone, hydrOXYzine, insulin aspart U-100, labetalol, magnesium sulfate IVPB, magnesium sulfate IVPB, ondansetron, oxyCODONE, oxyCODONE, promethazine (PHENERGAN) IVPB, sodium chloride 0.9%  Anticoagulants/Antiplatelets: no anticoagulation    Allergies: Review of patient's allergies indicates:  No Known Allergies  Sedation Hx: have not been any systemic reactions    Labs:  Recent Labs   Lab 05/28/19  0311   INR 1.1       Recent Labs   Lab 05/29/19  0321   WBC 11.26   HGB 8.2*   HCT 27.1*   MCV 95         Recent Labs   Lab 05/28/19  0311 05/29/19  0956   * 101    144   K 4.1 4.0   * 110   CO2 22* 25   BUN 29* 28*   CREATININE 1.1 1.2   CALCIUM 8.9 9.0   MG 2.1  --    ALT 5*  --    AST 16  --    ALBUMIN 1.5*  --    BILITOT 0.4  --          Vitals:  Temp: 99.1 °F (37.3 °C) (05/29/19 1100)  Pulse: 89 (05/29/19 1500)  Resp: (!) 25 (05/29/19 1500)  BP: (!) 166/77 (05/29/19 1500)  SpO2: 100 % (05/29/19 1500)     Physical Exam:  ASA: 3  Mallampati: tracheostomy    General: no acute distress  Mental Status: alert and able to follow commands  HEENT: normocephalic, trach collar present  Chest: unlabored breathing  Heart: regular heart rate  Abdomen: nondistended  Extremity: moves all extremities    Plan: IVC filter placement   Sedation Plan: none    Shania Mars MD  PGY-1  Pager: [phone]

## 2019-05-29 NOTE — SUBJECTIVE & OBJECTIVE
Interval History/Significant Events: No acute events overnight. H/H stable. Failed voiding trial and correa replaced overnight. TF's started at 10 cc/hr yesterday, and patient denies current nausea. CXR shows continued edema.    Objective:     Vital Signs (Most Recent):  Temp: 98.4 °F (36.9 °C) (05/29/19 0700)  Pulse: 71 (05/29/19 0800)  Resp: (!) 21 (05/29/19 0800)  BP: (!) 183/85 (05/29/19 0800)  SpO2: 100 % (05/29/19 0800) Vital Signs (24h Range):  Temp:  [98.3 °F (36.8 °C)-98.8 °F (37.1 °C)] 98.4 °F (36.9 °C)  Pulse:  [67-96] 71  Resp:  [0-46] 21  SpO2:  [99 %-100 %] 100 %  BP: (160-187)/(76-87) 183/85     Weight: 77 kg (169 lb 12.1 oz)  Body mass index is 28.25 kg/m².      Intake/Output Summary (Last 24 hours) at 5/29/2019 0900  Last data filed at 5/29/2019 0800  Gross per 24 hour   Intake 708 ml   Output 605 ml   Net 103 ml       Physical Exam   Constitutional: She appears well-developed and well-nourished.   HENT:   Head: Normocephalic and atraumatic.   Tracheostomy in place   Eyes: Right eye exhibits no discharge. Left eye exhibits no discharge.   Neck: Normal range of motion. Neck supple.   Cardiovascular: Normal rate and regular rhythm.   Pulmonary/Chest: Effort normal. No respiratory distress.   Abdominal: Soft.   Midline incision c/d/i.  PEG with no leak. Abdomen soft, non-distended.  Bowel sounds present   Genitourinary:   Genitourinary Comments: Nephrostomy tube in place with yellow output.  Correa in place draining clear yellow urine   Musculoskeletal: Normal range of motion.   Neurological: She is alert.   Able to follow commands, denying pain.    Skin: Skin is warm and dry.   Psychiatric: She has a normal mood and affect.   Nursing note and vitals reviewed.      Vents:  Vent Mode: Spont (05/29/19 0722)  Ventilator Initiated: Yes (05/08/19 0630)  Set Rate: 18 bmp (05/27/19 0305)  Vt Set: 420 mL (05/27/19 0305)  Pressure Support: 15 cmH20 (05/27/19 0830)  PEEP/CPAP: 5 cmH20 (05/29/19 0722)  Oxygen  Concentration (%): 40 (05/29/19 0800)  Peak Airway Pressure: 19 cmH2O (05/29/19 0722)  Plateau Pressure: 0 cmH20 (05/29/19 0722)  Total Ve: 8.16 mL (05/29/19 0722)  Negative Inspiratory Force (cm H2O): -18 (04/27/19 1306)  F/VT Ratio<105 (RSBI): (!) 50.22 (05/29/19 0722)    Lines/Drains/Airways     Peripherally Inserted Central Catheter Line                 PICC Double Lumen 05/24/19 0900 right brachial 5 days          Drain                 Nephrostomy 04/21/19 0629 Left 8 Fr. 38 days         Gastrostomy/Enterostomy 05/02/19 1134 Percutaneous endoscopic gastrostomy (PEG) LUQ decompression;feeding 26 days         Urethral Catheter 05/29/19 0151 less than 1 day          Airway                 Surgical Airway 05/02/19 1107 Shiley Cuffed 26 days                Significant Labs:    CBC/Anemia Profile:  Recent Labs   Lab 05/28/19  0311 05/29/19  0321   WBC 10.90 11.26   HGB 7.8* 8.2*   HCT 24.7* 27.1*    272   MCV 92 95   RDW 14.5 14.3        Chemistries:  Recent Labs   Lab 05/28/19  0311      K 4.1   *   CO2 22*   BUN 29*   CREATININE 1.1   CALCIUM 8.9   ALBUMIN 1.5*   PROT 6.1   BILITOT 0.4   ALKPHOS 113   ALT 5*   AST 16   MG 2.1   PHOS 3.4       All pertinent labs within the past 24 hours have been reviewed.    Significant Imaging:  I have reviewed all pertinent imaging results/findings within the past 24 hours.

## 2019-05-29 NOTE — ASSESSMENT & PLAN NOTE
Mariann Huff is a 59 y.o. female s/p left ureteral injury on 4/12, who presented with fever, AMS, and intraabdominal abscess, taken for washout and ligation of left ureter with nephrostomy tube placement on 4/21. S/p Trach/PEG on 5/2. Episode of negative pressure pulmonary edema on 5/8 requiring mechanical ventilation, diuresis and low dose pressor. Has been plagued by intermittent BRBPR.     Neuro:   - off sedation  - PRN oxy, tylenol  - responds to questions appropriately by nodding  - no focal deficit     Pulmonary:   - CPAP vs trach collar, will place on trach collar today  - ABGs prn  - diuresis as tolerates     Cardiac:   - alternating HTN and hypotension  - TTE 5/8 with no evidence of right heart strain or significant valvular pathology, normal LV systolic function, EF 70%     Renal:    - S/p transection of left ureter 4/12 and subsequent ligation and PCT nephrostomy tube placement with IR 4/21  - Fontenot removed 5/15, removed but failed VT on 5/28   - Fontenot replaced 5/29  - Monitor I/Os  - Diurese PRN     ID:   - AF  - WBC stable  - Blood 5/10 NGTD  - Ucx 5/13 candiduria  - BAL 5/18 pseudomonas  - C diff 5/5 negative  - on fluconazole, ancef, rifampin     Hem/Onc:   - 5/24- 1U PRBC given  - H/H stable overnight  - Continue to check CBC daily      Endocrine:  - DM Type 2  - LDSSI  - Endocrine following for assistance with blood glucose control.      Fluids/Electrolytes/Nutrition/GI:   - Free water flushes 300 cc q6h  - Replace electrolytes PRN  - Currently TF's at 10 cc/hr (goal 45) - will advance as tolerated    # Rectal Ulcers  - intermittent hematochezia; ulcers seen to be improving on partial colonoscopy 5/21  - no bleeding in last 24 hours  - anoscopy and hemostatic agent applied by CRS 5/15  - imodium QID  - C scope by CRS 5/24 - no active bleed, healing rectal ulcer      PPx:  - DVT: Lovenox, Hep gtt held for continued bloody BMs     DISPO:  - LTAC planning   - trach collar trials

## 2019-05-29 NOTE — PROGRESS NOTES
"[facility]  Adult Nutrition  Progress Note    SUMMARY       Recommendations  Recommendation/Intervention:   Current TF providing < 85% EEN. Recommend modifying to Glucerna 1.5 at a goal rate of 45 mL/hr - to provide 1620 kcal/day, 89g protein/day, and 820mL free fluid/day.   RD to monitor.    Goals: Patient to receive nutrition by RD follow-up  Nutrition Goal Status: goal met  Communication of RD Recs: reviewed with RN    Reason for Assessment  Reason For Assessment: RD follow-up  Diagnosis: surgery/postoperative complications(ureteral transection of L kidney)  Relevant Medical History: CAD, DM2, HTN, HLD  General Information Comments: Trach/vent. TF paused yesterday due to N/V, restarted and currently at 20. Physical exam remains unchanged, continues to appear nourished with no physical signs of malnutrition at this time.  Nutrition Discharge Planning: Unable to determine at this time.    Nutrition Risk Screen  Nutrition Risk Screen: tube feeding or parenteral nutrition    Nutrition/Diet History  Spiritual, Cultural Beliefs, [employer] Practices, Values that Affect Care: no  Factors Affecting Nutritional Intake: NPO    Anthropometrics  Temp: 99.1 °F (37.3 °C)  Height Method: Estimated  Height: 5' 5" (165.1 cm)  Height (inches): 65 in  Weight Method: Bed Scale  Weight: 77 kg (169 lb 12.1 oz)  Weight (lb): 169.76 lb  Ideal Body Weight (IBW), Female: 125 lb  % Ideal Body Weight, Female (lb): 146.38 lb  BMI (Calculated): 30.5  BMI Grade: 30 - 34.9- obesity - grade I    Lab/Procedures/Meds  Pertinent Labs Reviewed: reviewed  Pertinent Labs Comments: Cl 112, BUN 29, Glu 133, Alb 1.5  Pertinent Medications Reviewed: reviewed  Pertinent Medications Comments: pantoprazole    Estimated/Assessed Needs  Weight Used For Calorie Calculations: 71.4 kg (157 lb 6.5 oz)(admit weight)  Energy Calorie Requirements (kcal): 1551 kcal/day  Energy Need Method: [facility]  Protein Requirements:  g/day(1.2-1.4 " g/kg)  Weight Used For Protein Calculations: 71.4 kg (157 lb 6.5 oz)(admit weight)  Fluid Requirements (mL): 1 mL/kcal or per MD  Estimated Fluid Requirement Method: RDA Method  RDA Method (mL): 1551    Nutrition Prescription Ordered  Current Diet Order: NPO  Current Nutrition Support Formula Ordered: Peptamen Intense VHP  Current Nutrition Support Rate Ordered: 40 (ml)  Current Nutrition Support Frequency Ordered: mL/hr    Evaluation of Received Nutrient/Fluid Intake  Enteral Calories (kcal): 960  Enteral Protein (gm): 88  Enteral (Free Water) Fluid (mL): 806  % Kcal Needs: 62  % Protein Needs: 102  Other Fluid (mL): 1200(300mL q 6 hours)  Total Fluid Intake (mL): 2006  I/O: +15.6L since admit  Energy Calories Required: not meeting needs  Protein Required: meeting needs  Fluid Required: (per MD)  Comments: LBM 5/24  Tolerance: tolerating  % Intake of Estimated Energy Needs: 50 - 75 %  % Meal Intake: NPO    Nutrition Risk  Level of Risk/Frequency of Follow-up: low(1x/week)     Assessment and Plan  Nutrition Problem  Inadequate energy intake     Related to (etiology):   Decreased ability to consume sufficient energy     Signs and Symptoms (as evidenced by):   NPO, TF providing < 85% EEN and EPN     Interventions/Recommendations (treatment strategy):  Collaboration and referral of nutrition care     Nutrition Diagnosis Status:   Continues    Monitor and Evaluation  Food and Nutrient Intake: energy intake, enteral nutrition intake  Food and Nutrient Adminstration: enteral and parenteral nutrition administration  Anthropometric Measurements: weight, weight change  Biochemical Data, Medical Tests and Procedures: electrolyte and renal panel, gastrointestinal profile, glucose/endocrine profile, inflammatory profile  Nutrition-Focused Physical Findings: overall appearance     Nutrition Follow-Up  RD Follow-up?: Yes

## 2019-05-29 NOTE — PLAN OF CARE
05/29/19 1612   Post-Acute Status   Post-Acute Authorization Placement   Post-Acute Placement Status Pending Payor Medical Review     Peer to Peer initiated with [facility] Medical Director and Dr. Mook Ferguson. Pt will need to fail two trach collar attempts to have approval for LTAC. If she passes the Medical Director will approve SNF.

## 2019-05-29 NOTE — PROCEDURES
Radiology Post-Procedure Note    Pre Op Diagnosis: Deep venous thrombosis    Post Op Diagnosis: : Deep venous thrombosis    Procedure: IVC filter placement    Procedure performed by: Bishnu Jiménez MD    Written Informed Consent Obtained: Yes    Specimen Removed: NO    Estimated Blood Loss: Minimal    Findings: After written informed consent and sterile prep and drape, the right internal jugular vein was cannulated and a sheath was placed into the IVC.  Contrast injection under fluoroscopy revealed anatomy suitable for placement of IVC filter immediately inferior to the renal veins.  The filter was placed at this level under fluoroscopic surveillance and post placement venogram demonstrated adequate position and function.  Please see full procedural report in Imaging for further details.       Patient tolerated procedure well.    Bishnu Jiménez MD  Diagnostic and Interventional Radiologist  Department of Radiology  Pager: [phone]

## 2019-05-29 NOTE — PLAN OF CARE
Pt resting comfortably in bed, all VSS at this time  AAO x 4, nods/gestures appropriately, follows commands   On trach collar for most of day, 10L/40%, tolerated well by pt, SpO2 99%  Tube feeds currently @ 20 cc/hr, no residuals, plan to increase to goal of 45 cc/hr  Lasix 20mg admin once during shift, see flowsheet for UOP trends  Nephrostomy tube intact, no leakage noted, output yellow/orange  Bilateral lower extremity DVT's found upon US today, pt to IR to have IVC filter placed, tolerated well, insertion site R IJ, dressing CDI  Hydralazine admin x 1 for SBP > 180  Plan to transfer to LTAC pending insurance authorization  Plan of care reviewed with pt and spouse at bedside  Will continue to monitor

## 2019-05-29 NOTE — PROGRESS NOTES
[facility]  Urology  Progress Note    Patient Name: Mariann Huff  MRN: [id number]  Admission Date: 4/20/2019  Hospital Length of Stay: 39 days  Code Status: Full Code   Attending Provider: Robin Boyd MD   Primary Care Physician: Jasbir Haney MD    Subjective:     HPI:  Mariann Huff is a 59 y.o. female with history of HTN, type 2 diabetes mellitus, CAD, NSTEMI, and back pain who presents to [facility] with altered mental status and sepsis. She underwent L5-S1 OLIF with NSGY on 4/12 and had intraoperative left ureteral injury with ureteroureteral anastomosis and ureteral stent placement. She did well initially and had correa and MADHURI drain removed on 4/16. She began having chills and altered mental status 2 days ago and this has progressively worsened. No complaints of pain.     She is altered and HPI is limited. In the ED she is febrile to 103 and tachycardic and pressures are low normal. WBC is 5, creatinine is 3.6 baseline 1.0, lactate is 4.6. Cath UA concerning for infection, 3+ LE, >100 WBCs, and many bacteria on microscopy.    CT and MRI abdomen both show air with minimal fluid near the surgical site with left ureter coursing through. There is air throughout the proximal collecting system which is decompressed with JJ ureteral stent in good position.    Taken for ex lap, ligation of left ureter and left neph tube placement on 4/21/19.    Interval History: No acute events. Failed VT, Correa reinserted. Tolerated trach collar during day, off at night. 450 cc UOP afterwards from Correa, 100 cc/24h from neph tube. Cr pending.     Review of Systems  Objective:     Temp:  [98.3 °F (36.8 °C)-98.8 °F (37.1 °C)] 98.4 °F (36.9 °C)  Pulse:  [67-96] 71  Resp:  [0-46] 21  SpO2:  [99 %-100 %] 100 %  BP: (160-187)/(76-87) 183/85     Body mass index is 28.25 kg/m².    Date 05/29/19 0700 - 05/30/19 0659   Shift 9627-4648 6622-8634 7780-6086 24 Hour Total   INTAKE   NG/GT 55   55   Shift Total(mL/kg) 55(0.7)    55(0.7)   OUTPUT   Urine(mL/kg/hr) 55   55   Shift Total(mL/kg) 55(0.7)   55(0.7)   Weight (kg) 77 77 77 77     Bladder Scan Volume (mL): 27 mL (05/10/19 0846)  Post Void Cath Residual Output (mL): 27 mL (05/10/19 0846)    Drains     Drain                 Nephrostomy 04/21/19 0629 Left 8 Fr. 38 days         Gastrostomy/Enterostomy 05/02/19 1134 Percutaneous endoscopic gastrostomy (PEG) LUQ decompression;feeding 26 days         Urethral Catheter 05/29/19 0151 less than 1 day                Physical Exam   Constitutional: She appears well-developed. No distress.   HENT:   Head: Normocephalic and atraumatic.   Neck: No JVD present.   Cardiovascular: Normal rate and regular rhythm.    Pulmonary/Chest: Effort normal. No respiratory distress.   On vent   Abdominal: Soft. She exhibits no distension. There is no rebound and no guarding.   Incision c/d/i   G-tube   Genitourinary:   Genitourinary Comments: Fontenot in place draining clear yellow urine  Left neph tube with clear yellow urine   Neurological: She is alert.   Skin: Skin is warm and dry. She is not diaphoretic. No pallor.         Significant Labs:    BMP:  Recent Labs   Lab 05/26/19  1400 05/27/19  0330 05/28/19  0311    143 144   K 4.4 4.1 4.1   * 111* 112*   CO2 22* 22* 22*   BUN 25* 25* 29*   CREATININE 0.8 1.0 1.1   CALCIUM 8.5* 8.7 8.9       CBC:   Recent Labs   Lab 05/27/19  0330 05/28/19  0311 05/29/19  0321   WBC 11.53 10.90 11.26   HGB 8.0* 7.8* 8.2*   HCT 26.1* 24.7* 27.1*    245 272       Significant Imaging:  All pertinent imaging results/findings from the past 24 hours have been reviewed.                  Assessment/Plan:     * Ureteral transection of left native kidney  Mariann Huff is a 59 y.o. female s/p left ureteral injury on 4/12, presented with fever, AMS, and intraabdominal abscess, taken for washout and ligation of left ureter with neph tube placement on 4/21, Trach/PEG 5/2.  - on vent currently, tolerating TC during  day  - Tube feeds per SICU  - ID on board, appreciate recs:   - continue rifampin; completed cefepime x 7 days, now on Ancef; continue rifampin and Ancef until 6/18/19   - 5/13 UCx growing Candida albicans; 7 day course of diflucan 800 mg   - 5/19 BAL growing Pseudomonas   - ID recommends reimaging patient should she continue to show signs of infection  - Maintain neph tube and correa; patient failed VT on 5/28/19  - Urine output adequate, Cr stable and WNL  - diuresis/fluids per SICU  - bloody BM - flexible sigmoidoscopy on 5/21/19 showed no signs of bleeding, Colonoscopy showed healing rectal ulcer, EGD not indicated per GI; H&H stable    Dispo: continue ICU care; plan for transfer to LTAC once approved by insurance. Holding heparin gtt currently    Sepsis  As above        VTE Risk Mitigation (From admission, onward)        Ordered     IP VTE LOW RISK PATIENT  Once      05/08/19 1315     Place sequential compression device  Until discontinued      04/20/19 4644          Mook Ferguson MD  Urology  [facility]

## 2019-05-29 NOTE — PROGRESS NOTES
[facility]  Critical Care - Surgery  Progress Note    Patient Name: Mariann Huff  MRN: [id number]  Admission Date: 4/20/2019  Hospital Length of Stay: 39 days  Code Status: Full Code  Attending Provider: Robin Boyd MD  Primary Care Provider: Jasbir Haney MD   Principal Problem: Ureteral transection of left native kidney    Subjective:     Hospital/ICU Course:  The patient has had 2 episodes of bradycardia during the day.  Early in the morning, the patient had difficulty breathing and a mucus plug was found in the inner cannula of the tracheostomy.  She developed bradycardia but never arrested.  She also developed hypotension.  This was followed subsequently after she was bagged (Ambu) with tachypnea and hypertension and tachycardia.  Oxygen saturations were in the low 90s, and the arterial blood gas at that time revealed a significant alveolar arterial gradient with adequate ventilation.  Chest x-ray, which I personally reviewed, revealed what appeared to be a significant fluid overload.  We did perform an echocardiogram urgently, which I personally was present and reviewed the results.  There is no evidence of heart failure.  EKG was reported as normal and there is no evidence that the patient had an acute myocardial event.  The chest x-ray did suggest a significant amount of pulmonary edema. There was also a concern, because the the history of upper body deep venous thrombosis, that this could be a pulmonary embolism.  A CT scan of the chest , which I have personally reviewed show bilateral infiltrates compatible with pulmonary edema of non cardiogenic origin.  It was not possible to rule out a pulmonary embolism.  The patient is anticoagulated currently.  Ultrasound of the lower extremities was done and they do not show any deep venous thrombosis.    The patient did receive some fluids, because of her history of acute kidney injury, which has resolved and because she was receiving IV  contrast.  She has maintained a good urinary output during the day.  She has mostly being hemodynamically stable and her tachycardia resolved.  She remains sedated and on the ventilator.  Follow-up chest x-ray, which I personally reviewed, showed resolving pulmonary edema. Her oxygenation and oxygen saturations improved and we were able to wean the patient FiO2 down to 50%.    Of note, I did perform a bronchoscopy, which failed to reveal any amount of pus or mucus plugging.  There was also no evidence of aspiration.  BAL was performed and was sent.    A 2nd episode of bradycardia was observed in the afternoon.  This responded to the use of atropine and 0.1 mg of epinephrine.  She did have a tachycardic response with this which subsided rapidly.  She remained otherwise hemodynamically stable.  Arterial blood gases, reveal now a PO2 of 300 with adequate pCO2 and pH.  Mild hyperventilation.  She remains comfortable.  I have asked Cardiology to re-evaluate this patient.  For now she remains off pressors.    Neurologically the patient has been intact. She is awake alert and oriented with a nonfocal exam.    Her abdomen remains soft.  She has been NPO for now.  She has not had any further episodes of lower GI bleed.    The patient is having a good urinary output BUN and creatinine have been grossly within normal limits with a slight elevation of BUN.  Electrolytes are being followed and replaced as necessary.    Currently no evidence of infection in this patient.  Empirically I started antibiotics, but will stop them in the next 24-48 hours.    I have had the chance to talk to the patient's  at length and in detail.  I have explained our findings, I will keep him up-to-date as to any progress in understanding the etiology of these episodes and will wait continuing to talk to him as often as necessary.      Interval History/Significant Events: No acute events overnight. H/H stable. Failed voiding trial and correa  replaced overnight. TF's started at 10 cc/hr yesterday, and patient denies current nausea. CXR shows continued edema.    Objective:     Vital Signs (Most Recent):  Temp: 98.4 °F (36.9 °C) (05/29/19 0700)  Pulse: 71 (05/29/19 0800)  Resp: (!) 21 (05/29/19 0800)  BP: (!) 183/85 (05/29/19 0800)  SpO2: 100 % (05/29/19 0800) Vital Signs (24h Range):  Temp:  [98.3 °F (36.8 °C)-98.8 °F (37.1 °C)] 98.4 °F (36.9 °C)  Pulse:  [67-96] 71  Resp:  [0-46] 21  SpO2:  [99 %-100 %] 100 %  BP: (160-187)/(76-87) 183/85     Weight: 77 kg (169 lb 12.1 oz)  Body mass index is 28.25 kg/m².      Intake/Output Summary (Last 24 hours) at 5/29/2019 0900  Last data filed at 5/29/2019 0800  Gross per 24 hour   Intake 708 ml   Output 605 ml   Net 103 ml       Physical Exam   Constitutional: She appears well-developed and well-nourished.   HENT:   Head: Normocephalic and atraumatic.   Tracheostomy in place   Eyes: Right eye exhibits no discharge. Left eye exhibits no discharge.   Neck: Normal range of motion. Neck supple.   Cardiovascular: Normal rate and regular rhythm.   Pulmonary/Chest: Effort normal. No respiratory distress.   Abdominal: Soft.   Midline incision c/d/i.  PEG with no leak. Abdomen soft, non-distended.  Bowel sounds present   Genitourinary:   Genitourinary Comments: Nephrostomy tube in place with yellow output.  Fontenot in place draining clear yellow urine   Musculoskeletal: Normal range of motion.   Neurological: She is alert.   Able to follow commands, denying pain.    Skin: Skin is warm and dry.   Psychiatric: She has a normal mood and affect.   Nursing note and vitals reviewed.      Vents:  Vent Mode: Spont (05/29/19 0722)  Ventilator Initiated: Yes (05/08/19 0630)  Set Rate: 18 bmp (05/27/19 0305)  Vt Set: 420 mL (05/27/19 0305)  Pressure Support: 15 cmH20 (05/27/19 0830)  PEEP/CPAP: 5 cmH20 (05/29/19 0722)  Oxygen Concentration (%): 40 (05/29/19 0800)  Peak Airway Pressure: 19 cmH2O (05/29/19 0722)  Plateau Pressure: 0 cmH20  (05/29/19 0722)  Total Ve: 8.16 mL (05/29/19 0722)  Negative Inspiratory Force (cm H2O): -18 (04/27/19 1306)  F/VT Ratio<105 (RSBI): (!) 50.22 (05/29/19 0722)    Lines/Drains/Airways     Peripherally Inserted Central Catheter Line                 PICC Double Lumen 05/24/19 0900 right brachial 5 days          Drain                 Nephrostomy 04/21/19 0629 Left 8 Fr. 38 days         Gastrostomy/Enterostomy 05/02/19 1134 Percutaneous endoscopic gastrostomy (PEG) LUQ decompression;feeding 26 days         Urethral Catheter 05/29/19 0151 less than 1 day          Airway                 Surgical Airway 05/02/19 1107 Shiley Cuffed 26 days                Significant Labs:    CBC/Anemia Profile:  Recent Labs   Lab 05/28/19  0311 05/29/19  0321   WBC 10.90 11.26   HGB 7.8* 8.2*   HCT 24.7* 27.1*    272   MCV 92 95   RDW 14.5 14.3        Chemistries:  Recent Labs   Lab 05/28/19  0311      K 4.1   *   CO2 22*   BUN 29*   CREATININE 1.1   CALCIUM 8.9   ALBUMIN 1.5*   PROT 6.1   BILITOT 0.4   ALKPHOS 113   ALT 5*   AST 16   MG 2.1   PHOS 3.4       All pertinent labs within the past 24 hours have been reviewed.    Significant Imaging:  I have reviewed all pertinent imaging results/findings within the past 24 hours.    Assessment/Plan:     * Ureteral transection of left native kidney  Mariann Huff is a 59 y.o. female s/p left ureteral injury on 4/12, who presented with fever, AMS, and intraabdominal abscess, taken for washout and ligation of left ureter with nephrostomy tube placement on 4/21. S/p Trach/PEG on 5/2. Episode of negative pressure pulmonary edema on 5/8 requiring mechanical ventilation, diuresis and low dose pressor. Has been plagued by intermittent BRBPR.     Neuro:   - off sedation  - PRN oxy, tylenol  - responds to questions appropriately by nodding  - no focal deficit     Pulmonary:   - CPAP vs trach collar, will place on trach collar today  - ABGs prn  - diuresis as tolerates     Cardiac:   -  alternating HTN and hypotension  - TTE 5/8 with no evidence of right heart strain or significant valvular pathology, normal LV systolic function, EF 70%     Renal:    - S/p transection of left ureter 4/12 and subsequent ligation and PCT nephrostomy tube placement with IR 4/21  - Fontenot removed 5/15, removed but failed VT on 5/28   - Fontenot replaced 5/29  - Monitor I/Os  - Diurese PRN     ID:   - AF  - WBC stable  - Blood 5/10 NGTD  - Ucx 5/13 candiduria  - BAL 5/18 pseudomonas  - C diff 5/5 negative  - on fluconazole, ancef, rifampin     Hem/Onc:   - 5/24- 1U PRBC given  - H/H stable overnight  - Continue to check CBC daily      Endocrine:  - DM Type 2  - LDSSI  - Endocrine following for assistance with blood glucose control.      Fluids/Electrolytes/Nutrition/GI:   - Free water flushes 300 cc q6h  - Replace electrolytes PRN  - Currently TF's at 10 cc/hr (goal 45) - will advance as tolerated    # Rectal Ulcers  - intermittent hematochezia; ulcers seen to be improving on partial colonoscopy 5/21  - no bleeding in last 24 hours  - anoscopy and hemostatic agent applied by CRS 5/15  - imodium QID  - C scope by CRS 5/24 - no active bleed, healing rectal ulcer      PPx:  - DVT: Lovenox, Hep gtt held for continued bloody BMs     DISPO:  - LTAC planning   - trach collar trials               Critical care was time spent personally by me on the following activities: development of treatment plan with patient or surrogate and bedside caregivers, discussions with consultants, evaluation of patient's response to treatment, examination of patient, ordering and performing treatments and interventions, ordering and review of laboratory studies, ordering and review of radiographic studies, pulse oximetry, re-evaluation of patient's condition.  This critical care time did not overlap with that of any other provider or involve time for any procedures.     Christian Hyatt MD  Critical Care - Surgery  [facility]

## 2019-05-29 NOTE — SUBJECTIVE & OBJECTIVE
"Interval HPI:   Overnight events: Remains in SICU. NAEON. BG within goal ranges; not requiring SQ correction scale coverage.  Eating:   NPO  Nausea: Yes  Hypoglycemia and intervention: No  Fever: No  TPN and/or TF: Yes  If yes, type of TF/TPN and rate: Glucerna 1.5 at 10 cc/hr (goal 45 cc/hr)     BP (!) 183/87 (BP Location: Left arm, Patient Position: Lying)   Pulse 86   Temp 99.1 °F (37.3 °C) (Oral)   Resp (!) 25   Ht 5' 5" (1.651 m)   Wt 77 kg (169 lb 12.1 oz)   SpO2 100%   Breastfeeding? No   BMI 28.25 kg/m²     Labs Reviewed and Include    Recent Labs   Lab 05/29/19  0956      CALCIUM 9.0      K 4.0   CO2 25      BUN 28*   CREATININE 1.2     Lab Results   Component Value Date    WBC 11.26 05/29/2019    HGB 8.2 (L) 05/29/2019    HCT 27.1 (L) 05/29/2019    MCV 95 05/29/2019     05/29/2019     No results for input(s): TSH, FREET4 in the last 168 hours.  Lab Results   Component Value Date    HGBA1C 5.4 05/24/2019       Nutritional status:   Body mass index is 28.25 kg/m².  Lab Results   Component Value Date    ALBUMIN 1.5 (L) 05/28/2019    ALBUMIN 1.5 (L) 05/27/2019    ALBUMIN 1.6 (L) 05/26/2019     No results found for: PREALBUMIN    Estimated Creatinine Clearance: 52.4 mL/min (based on SCr of 1.2 mg/dL).    Accu-Checks  Recent Labs     05/27/19  1947 05/28/19  0003 05/28/19  0714 05/28/19  1117 05/28/19  1636 05/28/19  2359 05/29/19  0323 05/29/19  0812 05/29/19  1227 05/29/19  1229   POCTGLUCOSE 141* 138* 131* 138* 112* 101 107 107 70 108       Current Medications and/or Treatments Impacting Glycemic Control  Immunotherapy:    Immunosuppressants     None        Steroids:   Hormones (From admission, onward)    None        Pressors:    Autonomic Drugs (From admission, onward)    None        Hyperglycemia/Diabetes Medications:   Antihyperglycemics (From admission, onward)    Start     Stop Route Frequency Ordered    05/24/19 6478  insulin aspart U-100 pen 0-5 Units      -- SubQ " Every 6 hours PRN 05/24/19 5231

## 2019-05-29 NOTE — PLAN OF CARE
Problem: Physical Therapy Goal  Goal: Physical Therapy Goal  Goals to be met by:19    Patient will increase functional independence with mobility by performin. Supine to sit with Moderate Assistance - met  Revised goal: supine to sit through sidelying with minimal assist-not met  2. Sit to stand transfer with Minimal Assistance -not met  3. Gait  x 30 feet with Contact Guard Assistance using Rolling Walker. -not met  4. Lower extremity exercise program x15 reps , with assistance as needed- met       Outcome: Ongoing (interventions implemented as appropriate)  Pt's goals revised as needed and pt will continue to benefit from skilled PT services to work towards improved functional mobility including: bed mobility, transfers, and gait.   Anastacia Liriano, PT  2019

## 2019-05-29 NOTE — PROGRESS NOTES
"[facility]  Endocrinology  Progress Note    Admit Date: 4/20/2019     Reason for Consult: Management of T2DM, Hyperglycemia     Surgical Procedure and Date:       04/21/2019:         1. Exploratory laparotomy        2. Ligation of left ureter        3. Removal of left JJ ureteral stent      Diabetes diagnosis year: ANDRE    Home Diabetes Medications:  ANDRE  Toujeo 50 BID  Invokana 300mg daily   Novolog 35 units AM, 45 units PM, 35 units PM    How often checking glucose at home? ANDRE   BG readings on regimen: ANDRE  Hypoglycemia on the regimen?  ANDRE  Missed doses on regimen?  ANDRE    Diabetes Complications include:     Hyperglycemia and Diabetic retinopathy     Complicating diabetes co morbidities:   History of MI and MURTAZA, CAD, HLD    HPI:   Patient is a 59 y.o. female with a diagnosis of HTN, HLN, DM type 2, nonproliferative diabetic renopathy, CAD, NSTEMI, and back pain who presents to the ED with a complaint of altered mental status on 04/20/2019. Is now s/p Exploratory laparotomy, Ligation of left ureter, and Removal of left JJ ureteral stent. Endocrinology consulted to manage DM2/Hyperglycemia.    Lab Results   Component Value Date    HGBA1C 10.8 (H) 02/19/2019       Interval HPI:   Overnight events: Remains in SICU. NAEON. BG within goal ranges; not requiring SQ correction scale coverage.  Eating:   NPO  Nausea: Yes  Hypoglycemia and intervention: No  Fever: No  TPN and/or TF: Yes  If yes, type of TF/TPN and rate: Glucerna 1.5 at 10 cc/hr (goal 45 cc/hr)     BP (!) 183/87 (BP Location: Left arm, Patient Position: Lying)   Pulse 86   Temp 99.1 °F (37.3 °C) (Oral)   Resp (!) 25   Ht 5' 5" (1.651 m)   Wt 77 kg (169 lb 12.1 oz)   SpO2 100%   Breastfeeding? No   BMI 28.25 kg/m²      Labs Reviewed and Include    Recent Labs   Lab 05/29/19  0956      CALCIUM 9.0      K 4.0   CO2 25      BUN 28*   CREATININE 1.2     Lab Results   Component Value Date    WBC 11.26 05/29/2019    " HGB 8.2 (L) 05/29/2019    HCT 27.1 (L) 05/29/2019    MCV 95 05/29/2019     05/29/2019     No results for input(s): TSH, FREET4 in the last 168 hours.  Lab Results   Component Value Date    HGBA1C 5.4 05/24/2019       Nutritional status:   Body mass index is 28.25 kg/m².  Lab Results   Component Value Date    ALBUMIN 1.5 (L) 05/28/2019    ALBUMIN 1.5 (L) 05/27/2019    ALBUMIN 1.6 (L) 05/26/2019     No results found for: PREALBUMIN    Estimated Creatinine Clearance: 52.4 mL/min (based on SCr of 1.2 mg/dL).    Accu-Checks  Recent Labs     05/27/19  1947 05/28/19  0003 05/28/19  0714 05/28/19  1117 05/28/19  1636 05/28/19  2359 05/29/19  0323 05/29/19  0812 05/29/19  1227 05/29/19  1229   POCTGLUCOSE 141* 138* 131* 138* 112* 101 107 107 70 108       Current Medications and/or Treatments Impacting Glycemic Control  Immunotherapy:    Immunosuppressants     None        Steroids:   Hormones (From admission, onward)    None        Pressors:    Autonomic Drugs (From admission, onward)    None        Hyperglycemia/Diabetes Medications:   Antihyperglycemics (From admission, onward)    Start     Stop Route Frequency Ordered    05/24/19 1835  insulin aspart U-100 pen 0-5 Units      -- SubQ Every 6 hours PRN 05/24/19 1735          ASSESSMENT and PLAN    * Ureteral transection of left native kidney  Managed per primary team  Avoid hypoglycemia        Type 2 diabetes mellitus with diabetic peripheral angiopathy without gangrene  BG goal 140 - 180    Low Dose SQ Insulin Correction Scale PRN for BG excursions.  BG monitoring q 4 hrs.     ** Please notify Endocrine for any change and/or advance in diet/TF**  ** Please call Endocrine for any BG related issues **    Discharge Recommendations:     TBD.             CAD (coronary artery disease)  Managed per primary team  Condition may cause insulin resistance       Sleep apnea  May affect BG readings if uncontrolled        PAPA (acute kidney injury)  Caution with insulin  stacking        HLD (hyperlipidemia)  Managed per primary team  Condition may cause insulin resistance         On enteral nutrition  May cause BG excursions.           Jolanta Morales NP  Endocrinology  [facility]

## 2019-05-30 LAB
ANION GAP SERPL CALC-SCNC: 10 MMOL/L (ref 8–16)
BASOPHILS # BLD AUTO: 0.06 K/UL (ref 0–0.2)
BASOPHILS NFR BLD: 0.6 % (ref 0–1.9)
BUN SERPL-MCNC: 26 MG/DL (ref 6–20)
CALCIUM SERPL-MCNC: 9.4 MG/DL (ref 8.7–10.5)
CHLORIDE SERPL-SCNC: 109 MMOL/L (ref 95–110)
CO2 SERPL-SCNC: 26 MMOL/L (ref 23–29)
CREAT SERPL-MCNC: 0.9 MG/DL (ref 0.5–1.4)
DIFFERENTIAL METHOD: ABNORMAL
EOSINOPHIL # BLD AUTO: 1 K/UL (ref 0–0.5)
EOSINOPHIL NFR BLD: 9.4 % (ref 0–8)
ERYTHROCYTE [DISTWIDTH] IN BLOOD BY AUTOMATED COUNT: 14 % (ref 11.5–14.5)
EST. GFR  (AFRICAN AMERICAN): >60 ML/MIN/1.73 M^2
EST. GFR  (NON AFRICAN AMERICAN): >60 ML/MIN/1.73 M^2
GLUCOSE SERPL-MCNC: 134 MG/DL (ref 70–110)
HCT VFR BLD AUTO: 28.3 % (ref 37–48.5)
HGB BLD-MCNC: 8.5 G/DL (ref 12–16)
IMM GRANULOCYTES # BLD AUTO: 0.04 K/UL (ref 0–0.04)
IMM GRANULOCYTES NFR BLD AUTO: 0.4 % (ref 0–0.5)
LYMPHOCYTES # BLD AUTO: 1.3 K/UL (ref 1–4.8)
LYMPHOCYTES NFR BLD: 12.5 % (ref 18–48)
MCH RBC QN AUTO: 28.7 PG (ref 27–31)
MCHC RBC AUTO-ENTMCNC: 30 G/DL (ref 32–36)
MCV RBC AUTO: 96 FL (ref 82–98)
MONOCYTES # BLD AUTO: 0.8 K/UL (ref 0.3–1)
MONOCYTES NFR BLD: 7.3 % (ref 4–15)
NEUTROPHILS # BLD AUTO: 7.3 K/UL (ref 1.8–7.7)
NEUTROPHILS NFR BLD: 69.8 % (ref 38–73)
NRBC BLD-RTO: 0 /100 WBC
PLATELET # BLD AUTO: 292 K/UL (ref 150–350)
PMV BLD AUTO: 11 FL (ref 9.2–12.9)
POCT GLUCOSE: 138 MG/DL (ref 70–110)
POCT GLUCOSE: 143 MG/DL (ref 70–110)
POCT GLUCOSE: 145 MG/DL (ref 70–110)
POCT GLUCOSE: 161 MG/DL (ref 70–110)
POCT GLUCOSE: 164 MG/DL (ref 70–110)
POCT GLUCOSE: 168 MG/DL (ref 70–110)
POCT GLUCOSE: 180 MG/DL (ref 70–110)
POCT GLUCOSE: 198 MG/DL (ref 70–110)
POTASSIUM SERPL-SCNC: 3.6 MMOL/L (ref 3.5–5.1)
RBC # BLD AUTO: 2.96 M/UL (ref 4–5.4)
SODIUM SERPL-SCNC: 145 MMOL/L (ref 136–145)
WBC # BLD AUTO: 10.42 K/UL (ref 3.9–12.7)

## 2019-05-30 PROCEDURE — 97530 THERAPEUTIC ACTIVITIES: CPT

## 2019-05-30 PROCEDURE — 27200966 HC CLOSED SUCTION SYSTEM

## 2019-05-30 PROCEDURE — 99900035 HC TECH TIME PER 15 MIN (STAT)

## 2019-05-30 PROCEDURE — 85025 COMPLETE CBC W/AUTO DIFF WBC: CPT

## 2019-05-30 PROCEDURE — 63600175 PHARM REV CODE 636 W HCPCS: Performed by: STUDENT IN AN ORGANIZED HEALTH CARE EDUCATION/TRAINING PROGRAM

## 2019-05-30 PROCEDURE — 99232 SBSQ HOSP IP/OBS MODERATE 35: CPT | Mod: GC,,, | Performed by: SURGERY

## 2019-05-30 PROCEDURE — 99232 PR SUBSEQUENT HOSPITAL CARE,LEVL II: ICD-10-PCS | Mod: GC,,, | Performed by: SURGERY

## 2019-05-30 PROCEDURE — 20000000 HC ICU ROOM

## 2019-05-30 PROCEDURE — 25000003 PHARM REV CODE 250: Performed by: STUDENT IN AN ORGANIZED HEALTH CARE EDUCATION/TRAINING PROGRAM

## 2019-05-30 PROCEDURE — 80048 BASIC METABOLIC PNL TOTAL CA: CPT

## 2019-05-30 PROCEDURE — 97116 GAIT TRAINING THERAPY: CPT

## 2019-05-30 PROCEDURE — 94761 N-INVAS EAR/PLS OXIMETRY MLT: CPT

## 2019-05-30 PROCEDURE — 27000221 HC OXYGEN, UP TO 24 HOURS

## 2019-05-30 RX ORDER — POTASSIUM CHLORIDE 20 MEQ/15ML
40 SOLUTION ORAL ONCE
Status: COMPLETED | OUTPATIENT
Start: 2019-05-30 | End: 2019-05-30

## 2019-05-30 RX ADMIN — MICONAZOLE NITRATE: 20 OINTMENT TOPICAL at 08:05

## 2019-05-30 RX ADMIN — RIFAMPIN 300 MG: 600 INJECTION, POWDER, LYOPHILIZED, FOR SOLUTION INTRAVENOUS at 03:05

## 2019-05-30 RX ADMIN — AMLODIPINE BESYLATE 10 MG: 10 TABLET ORAL at 08:05

## 2019-05-30 RX ADMIN — CHLORHEXIDINE GLUCONATE 0.12% ORAL RINSE 15 ML: 1.2 LIQUID ORAL at 08:05

## 2019-05-30 RX ADMIN — ONDANSETRON 4 MG: 2 INJECTION INTRAMUSCULAR; INTRAVENOUS at 12:05

## 2019-05-30 RX ADMIN — OXYCODONE HYDROCHLORIDE 5 MG: 5 TABLET ORAL at 09:05

## 2019-05-30 RX ADMIN — CEFAZOLIN 2 G: 1 INJECTION, POWDER, FOR SOLUTION INTRAMUSCULAR; INTRAVENOUS at 01:05

## 2019-05-30 RX ADMIN — CEFAZOLIN 2 G: 1 INJECTION, POWDER, FOR SOLUTION INTRAMUSCULAR; INTRAVENOUS at 09:05

## 2019-05-30 RX ADMIN — PANTOPRAZOLE SODIUM 40 MG: 40 GRANULE, DELAYED RELEASE ORAL at 08:05

## 2019-05-30 RX ADMIN — POTASSIUM CHLORIDE 40 MEQ: 20 SOLUTION ORAL at 12:05

## 2019-05-30 RX ADMIN — HYDROXYZINE HYDROCHLORIDE 10 MG: 10 SOLUTION ORAL at 06:05

## 2019-05-30 RX ADMIN — CHLORHEXIDINE GLUCONATE 0.12% ORAL RINSE 15 ML: 1.2 LIQUID ORAL at 09:05

## 2019-05-30 RX ADMIN — CEFAZOLIN 2 G: 1 INJECTION, POWDER, FOR SOLUTION INTRAMUSCULAR; INTRAVENOUS at 05:05

## 2019-05-30 NOTE — PLAN OF CARE
Problem: Occupational Therapy Goal  Goal: Occupational Therapy Goal  Goals to be met by: 6/5/19     Patient will increase functional independence with ADLs by performing:    UE Dressing with Set-up Assistance.  LE Dressing with Maximum Assistance and Assistive Devices as needed.  Grooming while standing with Minimal Assistance.  Toileting from bedside commode with Minimal Assistance for hygiene and clothing management.   Sitting at edge of bed x10 minutes with Supervision.  Rolling to Bilateral with Minimal Assistance.   Supine to sit with Minimal Assistance.  Toilet transfer to bedside commode with Minimal Assistance.      Outcome: Ongoing (interventions implemented as appropriate)  The above goals remain appropriate. PRIMTIIVO Dash  5/30/2019

## 2019-05-30 NOTE — PROGRESS NOTES
[facility]  Urology  Progress Note    Patient Name: Mariann Huff  MRN: [id number]  Admission Date: 4/20/2019  Hospital Length of Stay: 40 days  Code Status: Full Code   Attending Provider: Robin Boyd MD   Primary Care Physician: Jasbir Haney MD    Subjective:     HPI:  Mariann Huff is a 59 y.o. female with history of HTN, type 2 diabetes mellitus, CAD, NSTEMI, and back pain who presents to [facility] with altered mental status and sepsis. She underwent L5-S1 OLIF with NSGY on 4/12 and had intraoperative left ureteral injury with ureteroureteral anastomosis and ureteral stent placement. She did well initially and had correa and MADHURI drain removed on 4/16. She began having chills and altered mental status 2 days ago and this has progressively worsened. No complaints of pain.     She is altered and HPI is limited. In the ED she is febrile to 103 and tachycardic and pressures are low normal. WBC is 5, creatinine is 3.6 baseline 1.0, lactate is 4.6. Cath UA concerning for infection, 3+ LE, >100 WBCs, and many bacteria on microscopy.    CT and MRI abdomen both show air with minimal fluid near the surgical site with left ureter coursing through. There is air throughout the proximal collecting system which is decompressed with JJ ureteral stent in good position.    Taken for ex lap, ligation of left ureter and left neph tube placement on 4/21/19.    Interval History: Patient found to have RLE DVT on US yesterday, while heparin was held due to GI bleeding. Patient underwent IVC filter placement. No other acute events. On trach collar currently. UOP excellent, Cr stable.     Review of Systems  Objective:     Temp:  [98.2 °F (36.8 °C)-99.5 °F (37.5 °C)] 99 °F (37.2 °C)  Pulse:  [71-97] 89  Resp:  [17-33] 20  SpO2:  [91 %-100 %] 99 %  BP: (140-186)/(63-87) 154/67     Body mass index is 28.25 kg/m².      Bladder Scan Volume (mL): 27 mL (05/10/19 0846)  Post Void Cath Residual Output (mL): 27 mL (05/10/19  0846)    Drains     Drain                 Nephrostomy 04/21/19 0629 Left 8 Fr. 39 days         Gastrostomy/Enterostomy 05/02/19 1134 Percutaneous endoscopic gastrostomy (PEG) LUQ decompression;feeding 27 days         Urethral Catheter 05/29/19 0151 1 day                Physical Exam   Constitutional: She appears well-developed. No distress.   HENT:   Head: Normocephalic and atraumatic.   Neck: No JVD present.   Cardiovascular: Normal rate and regular rhythm.    Pulmonary/Chest: Effort normal. No respiratory distress.   On vent   Abdominal: Soft. She exhibits no distension. There is no rebound and no guarding.   Incision c/d/i   G-tube   Genitourinary:   Genitourinary Comments: Fontenot in place draining clear yellow urine  Left neph tube with clear yellow urine   Neurological: She is alert.   Skin: Skin is warm and dry. She is not diaphoretic. No pallor.         Significant Labs:    BMP:  Recent Labs   Lab 05/28/19  0311 05/29/19  0956 05/30/19  0400    144 145   K 4.1 4.0 3.6   * 110 109   CO2 22* 25 26   BUN 29* 28* 26*   CREATININE 1.1 1.2 0.9   CALCIUM 8.9 9.0 9.4       CBC:   Recent Labs   Lab 05/28/19  0311 05/29/19  0321 05/30/19  0400   WBC 10.90 11.26 10.42   HGB 7.8* 8.2* 8.5*   HCT 24.7* 27.1* 28.3*    272 292       Significant Imaging:  All pertinent imaging results/findings from the past 24 hours have been reviewed.                  Assessment/Plan:     * Ureteral transection of left native kidney  Mariann Huff is a 59 y.o. female s/p left ureteral injury on 4/12, presented with fever, AMS, and intraabdominal abscess, taken for washout and ligation of left ureter with neph tube placement on 4/21, Trach/PEG 5/2.  - on trach collar currently  - Tube feeds per SICU  - ID on board, appreciate recs:   - continue rifampin; completed cefepime x 7 days, now on Ancef; continue rifampin and Ancef until 6/18/19   - 5/13 UCx growing Candida albicans; 7 day course of diflucan 800 mg   - 5/19 BAL  growing Pseudomonas   - ID recommends reimaging patient should she continue to show signs of infection  - Maintain neph tube and correa; patient failed VT on 5/28/19  - Urine output adequate, Cr stable and WNL  - diuresis/fluids per SICU  - bloody BM - flexible sigmoidoscopy on 5/21/19 showed no signs of bleeding, Colonoscopy showed healing rectal ulcer, EGD not indicated per GI; H&H stable  - heparin for RUE held due to GI bleeding, patient found on 5/29 to have RLE DVT; IVC filter placed 5/29    Dispo: continue ICU care; will need to fail trach collar trial twice more before transfer to LTAC    Sepsis  As above        VTE Risk Mitigation (From admission, onward)        Ordered     IP VTE LOW RISK PATIENT  Once      05/08/19 1315     Place sequential compression device  Until discontinued      04/20/19 4524          Mook Ferguson MD  Urology  [facility]

## 2019-05-30 NOTE — ASSESSMENT & PLAN NOTE
Mariann Huff is a 59 y.o. female s/p left ureteral injury on 4/12, presented with fever, AMS, and intraabdominal abscess, taken for washout and ligation of left ureter with neph tube placement on 4/21, Trach/PEG 5/2.  - on trach collar currently  - Tube feeds per SICU  - ID on board, appreciate recs:   - continue rifampin; completed cefepime x 7 days, now on Ancef; continue rifampin and Ancef until 6/18/19   - 5/13 UCx growing Candida albicans; 7 day course of diflucan 800 mg   - 5/19 BAL growing Pseudomonas   - ID recommends reimaging patient should she continue to show signs of infection  - Maintain neph tube and correa; patient failed VT on 5/28/19  - Urine output adequate, Cr stable and WNL  - diuresis/fluids per SICU  - bloody BM - flexible sigmoidoscopy on 5/21/19 showed no signs of bleeding, Colonoscopy showed healing rectal ulcer, EGD not indicated per GI; H&H stable  - heparin for RUE held due to GI bleeding, patient found on 5/29 to have RLE DVT; IVC filter placed 5/29    Dispo: continue ICU care; will need to fail trach collar trial twice more before transfer to LTAC

## 2019-05-30 NOTE — DISCHARGE SUMMARY
[facility]  General Surgery  Discharge Summary      Patient Name: Mariann Huff  MRN: [id number]  Admission Date: 4/20/2019  Hospital Length of Stay: 46 days  Discharge Date and Time:  06/05/2019 3:27 PM  Attending Physician: Robin Boyd MD   Discharging Provider: Mook Ferguson MD  Primary Care Provider: Jasbir Haney MD    HPI:   Mariann Huff is a 59 y.o. female with history of HTN, type 2 diabetes mellitus, CAD, NSTEMI, and back pain who presents to [facility] with altered mental status and sepsis. She underwent L5-S1 OLIF with NSGY on 4/12 and had intraoperative left ureteral injury with ureteroureteral anastomosis and ureteral stent placement. She did well initially and had correa and MADHURI drain removed on 4/16. She began having chills and altered mental status 2 days ago and this has progressively worsened. No complaints of pain.     She is altered and HPI is limited. In the ED she is febrile to 103 and tachycardic and pressures are low normal. WBC is 5, creatinine is 3.6 baseline 1.0, lactate is 4.6. Cath UA concerning for infection, 3+ LE, >100 WBCs, and many bacteria on microscopy.    CT and MRI abdomen both show air with minimal fluid near the surgical site with left ureter coursing through. There is air throughout the proximal collecting system which is decompressed with JJ ureteral stent in good position.    Taken for ex lap, ligation of left ureter and left neph tube placement on 4/21/19.    Procedure(s) (LRB):  COLONOSCOPY (N/A)      Indwelling Lines/Drains at time of discharge:   Lines/Drains/Airways     Peripherally Inserted Central Catheter Line                 PICC Double Lumen 05/24/19 0900 right brachial 6 days          Drain                 Nephrostomy 04/21/19 0629 Left 8 Fr. 39 days         Gastrostomy/Enterostomy 05/02/19 1134 Percutaneous endoscopic gastrostomy (PEG) LUQ decompression;feeding 28 days         Urethral Catheter 05/29/19 0151 1 day          Airway                  Surgical Airway 05/02/19 1107 Shiley Cuffed 28 days              Hospital Course:     After her procedure, the patient was brought to the surgical ICU on a ventilator. She presented with septic shock c/b PAPA and shock liver, which resolved. She was unable to be weaned from the vent after multiple SBTs, despite extensive diuresis with Lasix, and she underwent trach/PEG on 5/2/19. Her hospital course was complicated by E. Coli infections in the blood and urine, for which she was treated with multiple antibiotics. Her repeat blood cultures were negative. Repeat urine culture on 5/13 grew Candida albicans, for which she was treated with diflucan. Her abdominal fluid collected at the time of surgery grew Staphylococcus lugdunensis. She developed a RIJ DVT; heparin gtt was started, but held due to hematochezia leading to pRBC transfusion requirements. Flexible sigmoidoscopy and colonoscopy showed a nonbleeding rectal ulcer, otherwise no acute findings. Patient later developed     4/21- ex-lap, ligation of L ureter with removal of proximal end of ureter; removal of L ureteral stent; placement of L nephrostomy tube; patient in septic shock, with shock liver and PAPA; GNR in blood and urine cultures; Zosyn started  4/22 - UCx speciated E. Coli; Zosyn changed to ertapenem  4/23 - failed SBT; switched from ertapenem to Rocephin  4/24 - failed SBT; rifampin added to Rocephin  4/25 - failed SBT  4/27 - failed SBT  4/28 - patient febrile  4/29 - vancomycin started  5/1 -CT A/P obtained for repeated fevers, shows seroma in midline abdominal wall but no abscess  5/2 - trach/PEG  5/3 - US shows RIJ DVT; heparin gtt started; placed on trach collar  5/4 - patient began having rectal bleeding; rectal tube removed  5/5 - continued hematochezia, requiring pRBC transfusion; heparin gtt held, Quikclot placed in rectum  5/6 - continued hematochezia  5/8 - patient had been doing well on trach collar previously, now desatted with bradycardia  and bag ventilated through tracheostomy; placed back on vent; bronchoscopy showed no signs of mucus plugging, BAL done; CT showed no PE but did show significant pulmonary edema; diuresed with Lasix, pressors started  5/9 - febrile; Vancomycin stopped  5/10 - patient placed on trach collar; placed on vent at night to rest; abx broadened to Vanc, Zosyn and rifampin; rectum packed with Quikclot for bleeding  5/12 - LUE swelling, US showed bilateral upper extremity DVTs; heparin continued to be held due to continued GI bleeding; rectum repacked with Surgicel  5/13 - flexible sigmoidoscopy showed rectal ulcer not actively bleeding; continued transfusion requirements  5/24 - colonoscopy showed no findings other than previously noted rectal ulcer  5/29 - US showed RLE DVT; IVC filter placed  5/31 - patient transferred from SICU to floor  6/3 - trach downsized from #8 to #6 Shiley  6/4 - patient tolerating [place] speaking valve  6/5 - patient transferred to [facility]    Patient will continue IV antibiotics (Ancef and Rifampin) until 6/18/19 via PICC.     Consults:   Consults (From admission, onward)        Status Ordering Provider     Inpatient consult to Colorectal Surgery  Once     Provider:  (Not yet assigned)    Completed ALONZO LORA     Inpatient consult to Colorectal Surgery  Once     Provider:  (Not yet assigned)    Completed GAIL SOLOMON     Inpatient consult to Critical Care Medicine  Once     Provider:  (Not yet assigned)    Completed KAMRYN MEDINA     Inpatient consult to Endocrinology  Once     Provider:  (Not yet assigned)    Completed ALINA TEJADA     Inpatient consult to Infectious Diseases  Once     Provider:  (Not yet assigned)    Completed ALINA TEJADA     Inpatient consult to Infectious Diseases  Once     Provider:  (Not yet assigned)    Completed JESU CASSIDY     Inpatient consult to Infectious Diseases  Once     Provider:  (Not yet assigned)     Completed SONYA CARTER     Inpatient consult to Infectious Diseases  Once     Provider:  (Not yet assigned)    Completed ALONZO LORA     Inpatient consult to Infectious Diseases  Once     Provider:  (Not yet assigned)    Completed HONORIO COPE     Inpatient consult to Interventional Radiology  Once     Provider:  (Not yet assigned)    Completed LEANNE SMITH     Inpatient consult to Interventional Radiology  Once     Provider:  (Not yet assigned)    Completed ALONZO LORA     Inpatient consult to Interventional Radiology  Once     Provider:  (Not yet assigned)    Completed SOFI RHOADES     Inpatient consult to Midline team  Once     Provider:  (Not yet assigned)    Completed ALONZO LORA     Inpatient consult to Midline team  Once     Provider:  (Not yet assigned)    Completed MIGUELANGEL MAURER     Inpatient consult to Midline team  Once     Provider:  (Not yet assigned)    Completed SOFI RHOADES     Inpatient consult to Midline team  Once     Provider:  (Not yet assigned)    Completed SOFI RHOADES     Inpatient consult to Midline team  Once     Provider:  (Not yet assigned)    Completed MIGUELANGEL MAURER     Inpatient consult to Nephrology  Once     Provider:  (Not yet assigned)    Completed RITA REES     Inpatient consult to Neurosurgery  Once     Provider:  (Not yet assigned)    Completed KAMRYN MEDINA     Inpatient consult to Neurosurgery  Once     Provider:  (Not yet assigned)    Acknowledged TRACEY FELDMAN     Inpatient consult to Palliative Care  Once     Provider:  (Not yet assigned)    Completed SOFI RHOADES     Inpatient consult to Physical Medicine Rehab  Once     Provider:  (Not yet assigned)    Completed NICOLE TREVIÑO     Inpatient consult to PICC team ([facility])  Once     Provider:  (Not yet assigned)    Completed DAWSON RAMSEY     Inpatient consult to Urology  Once     Provider:  (Not yet assigned)    Completed  KAMRYN MEDINA          Significant Diagnostic Studies: Labs:   BMP:   Recent Labs   Lab 06/04/19 0451   *      K 3.6      CO2 28   BUN 38*   CREATININE 0.8   CALCIUM 9.5    and CBC   Recent Labs   Lab 06/04/19 0451   WBC 11.52   HGB 8.3*   HCT 27.4*          Pending Diagnostic Studies:     Procedure Component Value Units Date/Time    APTT [[id number]] Collected:  05/12/19 0924    Order Status:  Sent Lab Status:  In process Updated:  05/12/19 0925    Specimen:  Blood     Basic metabolic panel [[id number]] Collected:  06/04/19 0320    Order Status:  Sent Lab Status:  In process Updated:  06/04/19 0320    Specimen:  Blood     CBC auto differential [[id number]] Collected:  06/04/19 0320    Order Status:  Sent Lab Status:  In process Updated:  06/04/19 0320    Specimen:  Blood     CBC auto differential [[id number]] Collected:  05/05/19 1944    Order Status:  Sent Lab Status:  In process Updated:  05/05/19 1944    Specimen:  Blood     Lactic acid, plasma [[id number]] Collected:  04/21/19 1735    Order Status:  Sent Lab Status:  In process Updated:  04/21/19 1735    Specimen:  Blood     VANCOMYCIN, TROUGH before 3rd dose [[id number]] Collected:  05/09/19 0914    Order Status:  Sent Lab Status:  In process Updated:  05/09/19 0914    Specimen:  Blood     VANCOMYCIN, TROUGH before next dose [[id number]] Collected:  05/09/19 0943    Order Status:  Sent Lab Status:  In process Updated:  05/09/19 0944    Specimen:  Blood         Final Active Diagnoses:    Diagnosis Date Noted POA    PRINCIPAL PROBLEM:  Ureteral transection of left native kidney [S37.10XA] 04/13/2019 Yes    Impaired functional mobility and endurance [Z74.09] 05/31/2019 Unknown    Acute deep vein thrombosis (DVT) of femoral vein of right lower extremity [I82.411]  No    Palliative care encounter [Z51.5] 05/27/2019 Not Applicable    Pain [R52]  Unknown    Protein malnutrition [E46]  No    Rectal ulcer [K62.6]  Yes    Status post  insertion of percutaneous endoscopic gastrostomy (PEG) tube [Z93.1]  Not Applicable    Urinary tract infection without hematuria [N39.0]  Unknown    Delayed surgical wound healing [T81.89XA] 05/13/2019 Yes    Bradycardia [R00.1] 05/08/2019 No    BRBPR (bright red blood per rectum) [K62.5] 05/07/2019 Unknown    Dermatitis associated with moisture [L30.8] 05/06/2019 Yes    Anemia [D64.9]  Unknown    Postoperative infection [T81.40XA]  No    Acute postoperative respiratory failure [J95.821]  No    Pulmonary edema [J81.1]  No    On enteral nutrition [Z78.9] 04/24/2019 Unknown    At risk for fluid volume overload [Z91.89] 04/21/2019 Not Applicable    Altered mental status [R41.82]  Yes    Encounter for weaning from ventilator [Z99.11]  Not Applicable    Metabolic acidosis [E87.2]  No    Sepsis [A41.9] 04/20/2019 Yes    Encephalopathy [G93.40] 04/20/2019 Yes    Type 2 diabetes mellitus, with long-term current use of insulin [E11.9, Z79.4] 04/20/2019 Not Applicable    HLD (hyperlipidemia) [E78.5] 04/20/2019 Yes    Asthma [J45.909] 04/20/2019 Yes    Essential hypertension [I10] 04/20/2019 Yes    Arthritis of lumbar spine [M47.816] 07/23/2018 Yes     Chronic    Type 2 diabetes mellitus with diabetic peripheral angiopathy without gangrene [E11.51] 02/24/2016 Yes     Chronic    PAPA (acute kidney injury) [N17.9] 02/17/2014 Yes    Sleep apnea [G47.30]  Yes     Chronic    CAD (coronary artery disease) [I25.10]  Yes     Chronic      Problems Resolved During this Admission:      Discharged Condition: fair    Disposition: Rehab Facility    Follow Up:  Follow-up Information     Robin Boyd MD In 1 month.    Specialty:  Urology  Contact information:  [address] [place] [address] [phone]                 Patient Instructions:      Notify your health care provider if you experience any of the following:  temperature >100.4     Notify your health care provider if you experience any of the  following:  persistent nausea and vomiting or diarrhea     Notify your health care provider if you experience any of the following:  severe uncontrolled pain     Notify your health care provider if you experience any of the following:  redness, tenderness, or signs of infection (pain, swelling, redness, odor or green/yellow discharge around incision site)     Notify your health care provider if you experience any of the following:  difficulty breathing or increased cough     Notify your health care provider if you experience any of the following:  severe persistent headache     Notify your health care provider if you experience any of the following:  worsening rash     Notify your health care provider if you experience any of the following:  persistent dizziness, light-headedness, or visual disturbances     Notify your health care provider if you experience any of the following:  increased confusion or weakness     Activity as tolerated     Medications:  Transfer Medications (for Discharge Readmit only):   Current Facility-Administered Medications   Medication Dose Route Frequency Provider Last Rate Last Dose    0.9%  NaCl infusion (for blood administration)   Intravenous Q24H PRN Ehsan Espino MD   500 mL at 05/24/19 1948    acetaminophen tablet 650 mg  650 mg Per G Tube Q6H PRN Ehsna Espino MD   650 mg at 05/26/19 2300    albuterol-ipratropium 2.5 mg-0.5 mg/3 mL nebulizer solution 3 mL  3 mL Nebulization Q4H PRN Carine Shea MD   3 mL at 05/19/19 1356    amLODIPine tablet 10 mg  10 mg Per G Tube Daily Christian Hyatt MD   10 mg at 06/05/19 0812    ceFAZolin injection 2 g  2 g Intravenous Q8H Ehsan Espino MD   2 g at 06/05/19 0528    chlorhexidine 0.12 % solution 15 mL  15 mL Mouth/Throat BID Ehsan Espino MD   15 mL at 06/05/19 0812    dextrose 10% (D10W) Bolus  12.5 g Intravenous PRN Uriah Holder NP        glucagon (human recombinant) injection 1 mg  1 mg Intramuscular  PRN Antoinetteke Cenac, NP        hydrALAZINE injection 10 mg  10 mg Intravenous Q6H PRN Venkatesh Stokes MD   10 mg at 05/31/19 1728    HYDROmorphone injection 1 mg  1 mg Intravenous Q6H PRN Christian Hyatt MD   1 mg at 06/04/19 2006    hydrOXYzine 10 mg/5 mL syrup 10 mg  10 mg Per G Tube Q4H PRN Zoë Du MD   10 mg at 05/30/19 1843    insulin aspart U-100 pen 1-10 Units  1-10 Units Subcutaneous Q4H PRN Luke Cenac, NP   1 Units at 06/04/19 0134    insulin aspart U-100 pen 6 Units  6 Units Subcutaneous Q24H Luke Cenac, NP   6 Units at 06/05/19 0017    insulin aspart U-100 pen 6 Units  6 Units Subcutaneous Q24H Luke Cenac, NP   6 Units at 06/05/19 0357    insulin aspart U-100 pen 6 Units  6 Units Subcutaneous Q24H Luke Cenac, NP   6 Units at 06/05/19 0812    insulin aspart U-100 pen 6 Units  6 Units Subcutaneous Q24H Luke Cenac, NP   6 Units at 06/04/19 1307    insulin aspart U-100 pen 6 Units  6 Units Subcutaneous Q24H Luke Cenac, NP   6 Units at 06/04/19 1718    insulin aspart U-100 pen 6 Units  6 Units Subcutaneous Q24H Luke Cenac, NP   6 Units at 06/04/19 2013    labetalol 20 mg/4 mL (5 mg/mL) IV syring  10 mg Intravenous Q4H PRN Good Hansen MD   10 mg at 05/22/19 1203    magnesium sulfate 2g in water 50mL IVPB (premix)  2 g Intravenous PRN Zoë Du MD   2 g at 05/25/19 2000    magnesium sulfate 2g in water 50mL IVPB (premix)  4 g Intravenous PRN Zoë Du MD   4 g at 05/20/19 0830    miconazole nitrate 2% ointment   Topical (Top) BID Henry Crump MD        ondansetron injection 4 mg  4 mg Intravenous Q8H PRN Zoë Du MD   4 mg at 06/04/19 2116    oxyCODONE immediate release tablet 5 mg  5 mg Per G Tube Q6H PRN Zoë Du MD   5 mg at 06/02/19 1430    oxyCODONE immediate release tablet Tab 10 mg  10 mg Per G Tube Q6H PRN Christian Hyatt MD   10 mg at 06/04/19 1757    pantoprazole suspension 40 mg  40 mg Per G Tube Daily DAWSON Dominique  MD Raul   40 mg at 06/04/19 1025    promethazine (PHENERGAN) 12.5 mg in dextrose 5 % 50 mL IVPB  12.5 mg Intravenous Q6H PRN Henry Crump  mL/hr at 05/29/19 2221 12.5 mg at 05/29/19 2221    rifAMpin (RIFADIN) 300 mg in sodium chloride 0.9% 100 mL IVPB  300 mg Intravenous Q12H Ehsan Espino  mL/hr at 06/05/19 0402 300 mg at 06/05/19 0402    scopolamine 1.3-1.5 mg (1 mg over 3 days) 1 patch  1 patch Transdermal Q3 Days Ehsan Espino MD   1 patch at 06/04/19 2125    sodium chloride 0.9% flush 10 mL  10 mL Intravenous PRN Carine Shea MD         Facility-Administered Medications Ordered in Other Encounters   Medication Dose Route Frequency Provider Last Rate Last Dose    lidocaine (PF) 10 mg/ml (1%) injection 10 mg  1 mL Intradermal Once MD Mook Sharma Jr., MD  General Surgery  [facility]

## 2019-05-30 NOTE — PT/OT/SLP PROGRESS
Physical Therapy Treatment    Patient Name:  Mariann Huff   MRN:  [id number]    Recommendations:     Discharge Recommendations:  rehabilitation facility   Discharge Equipment Recommendations: (TBD as pt progresses)   Barriers to discharge: Inaccessible home and Decreased caregiver support    Assessment:     Mariann Huff is a 59 y.o. female admitted with a medical diagnosis of Ureteral transection of left native kidney.  She presents with the following impairments/functional limitations:  weakness, gait instability, impaired endurance, impaired balance, impaired functional mobilty, decreased lower extremity function, pain, impaired cardiopulmonary response to activity . Pt motivated to progress with functional mobility.    Rehab Prognosis: Good; patient would benefit from acute skilled PT services to address these deficits and reach maximum level of function.    Recent Surgery: Procedure(s) (LRB):  COLONOSCOPY (N/A) 6 Days Post-Op    Plan:     During this hospitalization, patient to be seen 4 x/week to address the identified rehab impairments via gait training, therapeutic activities, therapeutic exercises, neuromuscular re-education and progress toward the following goals:    · Plan of Care Expires:  06/03/19    Subjective   Pt c/o pain in L groin in sitting    Pain/Comfort:  · Pain Rating 1: 5/10  · Location - Side 1: Left  · Location 1: groin  · Pain Addressed 1: Reposition, Cessation of Activity, Nurse notified  · Pain Rating Post-Intervention 1: 5/10      Objective:     Communicated with nurse prior to session.  Patient found supine with blood pressure cuff, correa catheter, PEG Tube, oxygen, PICC line, pulse ox (continuous), telemetry upon PT entry to room.     General Precautions: Standard, fall   Orthopedic Precautions:N/A   Braces: LSO     Functional Mobility:  · Bed Mobility:     · Rolling Right: minimum assistance  · Supine to Sit: moderate assistance  · Transfers:     · Sit to Stand:  Minimal assist x  1 trial from bedside chair then moderate assistance x 2 trials from bedside chair with rolling walker  · Bed to Chair: pt sliding forward on the bed in sitting and required maximal assistance to transfer to bedside chair with  hand-held assist  using  Stand Pivot; pt with difficulty advancing L LE to step towards chair.  · Gait: 12ft x 3 trials with RW with moderate assist +1 to follow with bedside chair. pt performed gait with flexed trunk, narrow LAKE with difficulty with L foot placement, decreased step length, and at slow pace. pt rested in sitting between gait trials.    AM-PAC 6 CLICK MOBILITY  Turning over in bed (including adjusting bedclothes, sheets and blankets)?: 3  Sitting down on and standing up from a chair with arms (e.g., wheelchair, bedside commode, etc.): 2  Moving from lying on back to sitting on the side of the bed?: 2  Moving to and from a bed to a chair (including a wheelchair)?: 2  Need to walk in hospital room?: 2  Climbing 3-5 steps with a railing?: 1  Basic Mobility Total Score: 12     Patient left up in chair with all lines intact, call button in reach and nurse notified..    GOALS:   Multidisciplinary Problems     Physical Therapy Goals        Problem: Physical Therapy Goal    Goal Priority Disciplines Outcome Goal Variances Interventions   Physical Therapy Goal     PT, PT/OT Ongoing (interventions implemented as appropriate)     Description:  Goals to be met by:19    Patient will increase functional independence with mobility by performin. Supine to sit with Moderate Assistance - met  Revised goal: supine to sit through sidelying with minimal assist-not met  2. Sit to stand transfer with Minimal Assistance -not met  3. Gait  x 30 feet with Contact Guard Assistance using Rolling Walker. -not met  4. Lower extremity exercise program x15 reps , with assistance as needed- met                         Time Tracking:     PT Received On: 19  PT Start Time: 825     PT Stop  Time: 0900  PT Total Time (min): 35 min     Billable Minutes: Gait Training 35    Treatment Type: Treatment  PT/PTA: PT     PTA Visit Number: 0     Anastacia Liriano, PT  05/30/2019

## 2019-05-30 NOTE — PROGRESS NOTES
[facility]  Critical Care - Surgery  Progress Note    Patient Name: Mariann Huff  MRN: [id number]  Admission Date: 4/20/2019  Hospital Length of Stay: 40 days  Code Status: Full Code  Attending Provider: Robin Boyd MD  Primary Care Provider: Jasbir Haney MD   Principal Problem: Ureteral transection of left native kidney    Subjective:     Hospital/ICU Course:  The patient has had 2 episodes of bradycardia during the day.  Early in the morning, the patient had difficulty breathing and a mucus plug was found in the inner cannula of the tracheostomy.  She developed bradycardia but never arrested.  She also developed hypotension.  This was followed subsequently after she was bagged (Ambu) with tachypnea and hypertension and tachycardia.  Oxygen saturations were in the low 90s, and the arterial blood gas at that time revealed a significant alveolar arterial gradient with adequate ventilation.  Chest x-ray, which I personally reviewed, revealed what appeared to be a significant fluid overload.  We did perform an echocardiogram urgently, which I personally was present and reviewed the results.  There is no evidence of heart failure.  EKG was reported as normal and there is no evidence that the patient had an acute myocardial event.  The chest x-ray did suggest a significant amount of pulmonary edema. There was also a concern, because the the history of upper body deep venous thrombosis, that this could be a pulmonary embolism.  A CT scan of the chest , which I have personally reviewed show bilateral infiltrates compatible with pulmonary edema of non cardiogenic origin.  It was not possible to rule out a pulmonary embolism.  The patient is anticoagulated currently.  Ultrasound of the lower extremities was done and they do not show any deep venous thrombosis.    The patient did receive some fluids, because of her history of acute kidney injury, which has resolved and because she was receiving IV  contrast.  She has maintained a good urinary output during the day.  She has mostly being hemodynamically stable and her tachycardia resolved.  She remains sedated and on the ventilator.  Follow-up chest x-ray, which I personally reviewed, showed resolving pulmonary edema. Her oxygenation and oxygen saturations improved and we were able to wean the patient FiO2 down to 50%.    Of note, I did perform a bronchoscopy, which failed to reveal any amount of pus or mucus plugging.  There was also no evidence of aspiration.  BAL was performed and was sent.    A 2nd episode of bradycardia was observed in the afternoon.  This responded to the use of atropine and 0.1 mg of epinephrine.  She did have a tachycardic response with this which subsided rapidly.  She remained otherwise hemodynamically stable.  Arterial blood gases, reveal now a PO2 of 300 with adequate pCO2 and pH.  Mild hyperventilation.  She remains comfortable.  I have asked Cardiology to re-evaluate this patient.  For now she remains off pressors.    Neurologically the patient has been intact. She is awake alert and oriented with a nonfocal exam.    Her abdomen remains soft.  She has been NPO for now.  She has not had any further episodes of lower GI bleed.    The patient is having a good urinary output BUN and creatinine have been grossly within normal limits with a slight elevation of BUN.  Electrolytes are being followed and replaced as necessary.    Currently no evidence of infection in this patient.  Empirically I started antibiotics, but will stop them in the next 24-48 hours.    I have had the chance to talk to the patient's  at length and in detail.  I have explained our findings, I will keep him up-to-date as to any progress in understanding the etiology of these episodes and will wait continuing to talk to him as often as necessary.      Interval History/Significant Events: NAEO. Restarted home ARB. Waiting on LTAC/SNF placement.    Follow-up  For: Procedure(s) (LRB):  COLONOSCOPY (N/A)    Post-Operative Day: 6 Days Post-Op    Objective:     Vital Signs (Most Recent):  Temp: 99.1 °F (37.3 °C) (05/30/19 1100)  Pulse: 97 (05/30/19 1515)  Resp: 20 (05/30/19 1515)  BP: (!) 140/67 (05/30/19 1500)  SpO2: 100 % (05/30/19 1515) Vital Signs (24h Range):  Temp:  [98.2 °F (36.8 °C)-99.2 °F (37.3 °C)] 99.1 °F (37.3 °C)  Pulse:  [78-97] 97  Resp:  [5-33] 20  SpO2:  [91 %-100 %] 100 %  BP: (124-179)/() 140/67     Weight: 77 kg (169 lb 12.1 oz)  Body mass index is 28.25 kg/m².      Intake/Output Summary (Last 24 hours) at 5/30/2019 1525  Last data filed at 5/30/2019 1400  Gross per 24 hour   Intake 1636 ml   Output 1875 ml   Net -239 ml       Physical Exam  Constitutional: She appears well-developed and well-nourished.   HENT:   Head: Normocephalic and atraumatic.   Tracheostomy in place   Eyes: Right eye exhibits no discharge. Left eye exhibits no discharge.   Neck: Normal range of motion. Neck supple.   Cardiovascular: Normal rate and regular rhythm.   Pulmonary/Chest: Effort normal. No respiratory distress.   Abdominal: Soft.   Midline incision c/d/i.  PEG with no leak. Abdomen soft, non-distended.  Bowel sounds present   Genitourinary:   Genitourinary Comments: Nephrostomy tube in place with yellow output.  Fontenot in place draining clear yellow urine   Musculoskeletal: Normal range of motion.   Neurological: She is alert.   Able to follow commands, denying pain.    Skin: Skin is warm and dry.   Psychiatric: She has a normal mood and affect.        Lines/Drains/Airways     Peripherally Inserted Central Catheter Line                 PICC Double Lumen 05/24/19 0900 right brachial 6 days          Drain                 Nephrostomy 04/21/19 0629 Left 8 Fr. 39 days         Gastrostomy/Enterostomy 05/02/19 1134 Percutaneous endoscopic gastrostomy (PEG) LUQ decompression;feeding 28 days         Urethral Catheter 05/29/19 0151 1 day          Airway                  Surgical Airway 05/02/19 1107 Shiley Cuffed 28 days                Significant Labs:    CBC/Anemia Profile:  Recent Labs   Lab 05/29/19  0321 05/30/19  0400   WBC 11.26 10.42   HGB 8.2* 8.5*   HCT 27.1* 28.3*    292   MCV 95 96   RDW 14.3 14.0        Chemistries:  Recent Labs   Lab 05/29/19  0956 05/30/19  0400    145   K 4.0 3.6    109   CO2 25 26   BUN 28* 26*   CREATININE 1.2 0.9   CALCIUM 9.0 9.4           Significant Imaging:  I have reviewed all pertinent imaging results/findings within the past 24 hours.    Assessment/Plan:     * Ureteral transection of left native kidney  Elizaamanda Huff is a 59 y.o. female s/p left ureteral injury on 4/12, who presented with fever, AMS, and intraabdominal abscess, taken for washout and ligation of left ureter with nephrostomy tube placement on 4/21. S/p Trach/PEG on 5/2. Episode of negative pressure pulmonary edema on 5/8 requiring mechanical ventilation, diuresis and low dose pressor. Has been plagued by intermittent BRBPR.     Neuro:   - off sedation  - PRN oxy, tylenol  - responds to questions appropriately by nodding  - no focal deficit     Pulmonary:   - CPAP vs trach collar, will place on trach collar today  - ABGs prn  - diuresis as tolerates     Cardiac:   - alternating HTN and hypotension  - TTE 5/8 with no evidence of right heart strain or significant valvular pathology, normal LV systolic function, EF 70%     Renal:    - S/p transection of left ureter 4/12 and subsequent ligation and PCT nephrostomy tube placement with IR 4/21  - Fontenot removed 5/15, removed but failed VT on 5/28   - Fontenot replaced 5/29  - Monitor I/Os  - Diurese PRN     ID:   - AF  - WBC stable  - Blood 5/10 NGTD  - Ucx 5/13 candiduria  - BAL 5/18 pseudomonas  - C diff 5/5 negative  - on fluconazole, ancef, rifampin     Hem/Onc:   - 5/24- 1U PRBC given  - H/H stable overnight  - Continue to check CBC daily      Endocrine:  - DM Type 2  - LDSSI  - Endocrine following for  assistance with blood glucose control.      Fluids/Electrolytes/Nutrition/GI:   - Free water flushes 300 cc q6h  - Replace electrolytes PRN  - Currently TF's at 10 cc/hr (goal 45) - will advance as tolerated    # Rectal Ulcers  - intermittent hematochezia; ulcers seen to be improving on partial colonoscopy 5/21  - no bleeding in last 24 hours  - anoscopy and hemostatic agent applied by CRS 5/15  - imodium QID  - C scope by CRS 5/24 - no active bleed, healing rectal ulcer      PPx:  - DVT: IVC filter     DISPO:  - LTAC planning   - trach collar                   Critical care was time spent personally by me on the following activities: development of treatment plan with patient or surrogate and bedside caregivers, discussions with consultants, evaluation of patient's response to treatment, examination of patient, ordering and performing treatments and interventions, ordering and review of laboratory studies, ordering and review of radiographic studies, pulse oximetry, re-evaluation of patient's condition.  This critical care time did not overlap with that of any other provider or involve time for any procedures.     Ehsan Espino MD  Critical Care - Surgery  [facility]

## 2019-05-30 NOTE — SUBJECTIVE & OBJECTIVE
Interval History: Patient found to have RLE DVT on US yesterday, while heparin was held due to GI bleeding. Patient underwent IVC filter placement. No other acute events. On trach collar currently. UOP excellent, Cr stable.     Review of Systems  Objective:     Temp:  [98.2 °F (36.8 °C)-99.5 °F (37.5 °C)] 99 °F (37.2 °C)  Pulse:  [71-97] 89  Resp:  [17-33] 20  SpO2:  [91 %-100 %] 99 %  BP: (140-186)/(63-87) 154/67     Body mass index is 28.25 kg/m².      Bladder Scan Volume (mL): 27 mL (05/10/19 0846)  Post Void Cath Residual Output (mL): 27 mL (05/10/19 0846)    Drains     Drain                 Nephrostomy 04/21/19 0629 Left 8 Fr. 39 days         Gastrostomy/Enterostomy 05/02/19 1134 Percutaneous endoscopic gastrostomy (PEG) LUQ decompression;feeding 27 days         Urethral Catheter 05/29/19 0151 1 day                Physical Exam   Constitutional: She appears well-developed. No distress.   HENT:   Head: Normocephalic and atraumatic.   Neck: No JVD present.   Cardiovascular: Normal rate and regular rhythm.    Pulmonary/Chest: Effort normal. No respiratory distress.   On vent   Abdominal: Soft. She exhibits no distension. There is no rebound and no guarding.   Incision c/d/i   G-tube   Genitourinary:   Genitourinary Comments: Fontenot in place draining clear yellow urine  Left neph tube with clear yellow urine   Neurological: She is alert.   Skin: Skin is warm and dry. She is not diaphoretic. No pallor.         Significant Labs:    BMP:  Recent Labs   Lab 05/28/19  0311 05/29/19  0956 05/30/19  0400    144 145   K 4.1 4.0 3.6   * 110 109   CO2 22* 25 26   BUN 29* 28* 26*   CREATININE 1.1 1.2 0.9   CALCIUM 8.9 9.0 9.4       CBC:   Recent Labs   Lab 05/28/19  0311 05/29/19  0321 05/30/19  0400   WBC 10.90 11.26 10.42   HGB 7.8* 8.2* 8.5*   HCT 24.7* 27.1* 28.3*    644 292       Significant Imaging:  All pertinent imaging results/findings from the past 24 hours have been reviewed.

## 2019-05-30 NOTE — ASSESSMENT & PLAN NOTE
Mariann Huff is a 59 y.o. female s/p left ureteral injury on 4/12, who presented with fever, AMS, and intraabdominal abscess, taken for washout and ligation of left ureter with nephrostomy tube placement on 4/21. S/p Trach/PEG on 5/2. Episode of negative pressure pulmonary edema on 5/8 requiring mechanical ventilation, diuresis and low dose pressor. Has been plagued by intermittent BRBPR.     Neuro:   - off sedation  - PRN oxy, tylenol  - responds to questions appropriately by nodding  - no focal deficit     Pulmonary:   - CPAP vs trach collar, will place on trach collar today  - ABGs prn  - diuresis as tolerates     Cardiac:   - alternating HTN and hypotension  - TTE 5/8 with no evidence of right heart strain or significant valvular pathology, normal LV systolic function, EF 70%     Renal:    - S/p transection of left ureter 4/12 and subsequent ligation and PCT nephrostomy tube placement with IR 4/21  - Fontenot removed 5/15, removed but failed VT on 5/28   - Fontenot replaced 5/29  - Monitor I/Os  - Diurese PRN     ID:   - AF  - WBC stable  - Blood 5/10 NGTD  - Ucx 5/13 candiduria  - BAL 5/18 pseudomonas  - C diff 5/5 negative  - on fluconazole, ancef, rifampin     Hem/Onc:   - 5/24- 1U PRBC given  - H/H stable overnight  - Continue to check CBC daily      Endocrine:  - DM Type 2  - LDSSI  - Endocrine following for assistance with blood glucose control.      Fluids/Electrolytes/Nutrition/GI:   - Free water flushes 300 cc q6h  - Replace electrolytes PRN  - Currently TF's at 10 cc/hr (goal 45) - will advance as tolerated    # Rectal Ulcers  - intermittent hematochezia; ulcers seen to be improving on partial colonoscopy 5/21  - no bleeding in last 24 hours  - anoscopy and hemostatic agent applied by CRS 5/15  - imodium QID  - C scope by CRS 5/24 - no active bleed, healing rectal ulcer      PPx:  - DVT: IVC filter     DISPO:  - LTAC planning   - trach collar

## 2019-05-30 NOTE — PLAN OF CARE
Problem: Adult Inpatient Plan of Care  Goal: Plan of Care Review  Outcome: Ongoing (interventions implemented as appropriate)  No significant events overnight. VSS. SBP <180. NSR via tele monitoring. Remains on 10L 40% trach collar, tolerating well. Suctioning Q2-3hr and PRN, small amount of secretions noted. TF @ goal 45cc/hr to PEG tube, tolerating well. FW Flush Q6hr. Some complaints of N/V, PRN antiemetics given. Denies pain or respiratory distress. Remains AOX4, mouths words, nods appropriately, follows commands. Fontenot in place with adequate UO, Nephrostomy tube in place with good UO. Accu check Q4hr, within goal, no SSI coverage needed. R PICC remains in place. S/P IVC filter placement, no complications noted. Turn/reposition Q2 hr in place. Free from falls or injuries this shift. Plan of care reviewed with patient and spouse, all questions answered. Will monitor.

## 2019-05-30 NOTE — PT/OT/SLP PROGRESS
Occupational Therapy   Treatment    Name: Mariann Huff  MRN: [id number]  Admitting Diagnosis:  Ureteral transection of left native kidney      Recommendations:     Discharge Recommendations: rehabilitation facility  Discharge Equipment Recommendations:  (TBD closer to d/c)  Barriers to discharge:  Decreased caregiver support    Assessment:     Mariann Huff is a 59 y.o. female with a medical diagnosis of Ureteral transection of left native kidney.  She presents with complications following back surgery. Performance deficits affecting function are weakness, impaired self care skills, impaired functional mobilty, impaired endurance, impaired balance, impaired cardiopulmonary response to activity, pain, gait instability.     Rehab Prognosis:  Good; patient would benefit from acute skilled OT services to address these deficits and reach maximum level of function.       Plan:     Patient to be seen 4 x/week to address the above listed problems via self-care/home management, therapeutic activities, therapeutic exercises  · Plan of Care Expires: 06/05/19  · Plan of Care Reviewed with: patient    Subjective     Pain/Comfort:  · Pain Rating 1: 0/10  · Pain Rating Post-Intervention 1: 0/10    Objective:     Communicated with: RN prior to session.  Patient found up in chair with blood pressure cuff, tracheostomy, telemetry, pulse ox (continuous), PICC line, PEG Tube, oxygen, correa catheter upon OT entry to room.    General Precautions: Standard, fall   Orthopedic Precautions:N/A   Braces:       Occupational Performance:     Bed Mobility:    · Patient completed Sit to Supine with moderate assistance     Functional Mobility/Transfers:  · Patient completed Sit <> Stand Transfer with moderate A using RW from b/s chair x 1 trial and from EOB with Mod A x 1 trial; Minimal A from EOB x 1 trial  · Functional Mobility: Moderate A for walker management and balance x ~6 steps from chair to bed    Activities of Daily Living:  · Upper  Body Dressing: maximal assistance    · Lower Body Dressing: maximal assistance        [facility] 6 Click ADL: 14    Treatment & Education:  Pt ed on OT POC  Pt found [facility] requesting to return to bed per scheduled plan  Pt completed transfers and mobility as above  Pt performed B UE AROM Ex's x 10 reps    Patient left supine with all lines intact, call button in reach and RN notifiedEducation:      GOALS:   Multidisciplinary Problems     Occupational Therapy Goals        Problem: Occupational Therapy Goal    Goal Priority Disciplines Outcome Interventions   Occupational Therapy Goal     OT, PT/OT Ongoing (interventions implemented as appropriate)    Description:  Goals to be met by: 6/5/19     Patient will increase functional independence with ADLs by performing:    UE Dressing with Set-up Assistance.  LE Dressing with Maximum Assistance and Assistive Devices as needed.  Grooming while standing with Minimal Assistance.  Toileting from bedside commode with Minimal Assistance for hygiene and clothing management.   Sitting at edge of bed x10 minutes with Supervision.  Rolling to Bilateral with Minimal Assistance.   Supine to sit with Minimal Assistance.  Toilet transfer to bedside commode with Minimal Assistance.                       Time Tracking:     OT Date of Treatment: 05/30/19  OT Start Time: 1015  OT Stop Time: 1028  OT Total Time (min): 13 min    Billable Minutes:Therapeutic Activity 13    PRIMITIVO Dash  5/30/2019

## 2019-05-30 NOTE — PLAN OF CARE
Problem: Physical Therapy Goal  Goal: Physical Therapy Goal  Goals to be met by:19    Patient will increase functional independence with mobility by performin. Supine to sit with Moderate Assistance - met  Revised goal: supine to sit through sidelying with minimal assist-not met  2. Sit to stand transfer with Minimal Assistance -not met  3. Gait  x 30 feet with Contact Guard Assistance using Rolling Walker. -not met  4. Lower extremity exercise program x15 reps , with assistance as needed- met        Outcome: Ongoing (interventions implemented as appropriate)  Pt's goals remain appropriate and pt will continue to benefit from skilled PT services to work towards improved functional mobility including: bed mobility, transfers, and gait.   Anastacia Liriano, PT  2019

## 2019-05-30 NOTE — CARE UPDATE
BG goal 140 - 180   BG is within goal without need for scheduled SQ insulin therapy. Patient Remains NPO with TF ordered.    Patient has history of glycemic complications. Will continue to follow.     Low Dose SQ Insulin Correction Scale PRN for BG excursions.  BG monitoring q 4 hrs.      ** Please notify Endocrine for any change and/or advance in diet/TF**  ** Please call Endocrine for any BG related issues **     Discharge Recommendations:     TBD.

## 2019-05-30 NOTE — SUBJECTIVE & OBJECTIVE
Interval History/Significant Events: NAEO. Restarted home ARB. Waiting on LTAC/SNF placement.    Follow-up For: Procedure(s) (LRB):  COLONOSCOPY (N/A)    Post-Operative Day: 6 Days Post-Op    Objective:     Vital Signs (Most Recent):  Temp: 99.1 °F (37.3 °C) (05/30/19 1100)  Pulse: 97 (05/30/19 1515)  Resp: 20 (05/30/19 1515)  BP: (!) 140/67 (05/30/19 1500)  SpO2: 100 % (05/30/19 1515) Vital Signs (24h Range):  Temp:  [98.2 °F (36.8 °C)-99.2 °F (37.3 °C)] 99.1 °F (37.3 °C)  Pulse:  [78-97] 97  Resp:  [5-33] 20  SpO2:  [91 %-100 %] 100 %  BP: (124-179)/() 140/67     Weight: 77 kg (169 lb 12.1 oz)  Body mass index is 28.25 kg/m².      Intake/Output Summary (Last 24 hours) at 5/30/2019 1525  Last data filed at 5/30/2019 1400  Gross per 24 hour   Intake 1636 ml   Output 1875 ml   Net -239 ml       Physical Exam  Constitutional: She appears well-developed and well-nourished.   HENT:   Head: Normocephalic and atraumatic.   Tracheostomy in place   Eyes: Right eye exhibits no discharge. Left eye exhibits no discharge.   Neck: Normal range of motion. Neck supple.   Cardiovascular: Normal rate and regular rhythm.   Pulmonary/Chest: Effort normal. No respiratory distress.   Abdominal: Soft.   Midline incision c/d/i.  PEG with no leak. Abdomen soft, non-distended.  Bowel sounds present   Genitourinary:   Genitourinary Comments: Nephrostomy tube in place with yellow output.  Fontenot in place draining clear yellow urine   Musculoskeletal: Normal range of motion.   Neurological: She is alert.   Able to follow commands, denying pain.    Skin: Skin is warm and dry.   Psychiatric: She has a normal mood and affect.        Lines/Drains/Airways     Peripherally Inserted Central Catheter Line                 PICC Double Lumen 05/24/19 0900 right brachial 6 days          Drain                 Nephrostomy 04/21/19 0629 Left 8 Fr. 39 days         Gastrostomy/Enterostomy 05/02/19 1134 Percutaneous endoscopic gastrostomy (PEG) LUQ  decompression;feeding 28 days         Urethral Catheter 05/29/19 0151 1 day          Airway                 Surgical Airway 05/02/19 1107 Shiley Cuffed 28 days                Significant Labs:    CBC/Anemia Profile:  Recent Labs   Lab 05/29/19  0321 05/30/19  0400   WBC 11.26 10.42   HGB 8.2* 8.5*   HCT 27.1* 28.3*    292   MCV 95 96   RDW 14.3 14.0        Chemistries:  Recent Labs   Lab 05/29/19  0956 05/30/19  0400    145   K 4.0 3.6    109   CO2 25 26   BUN 28* 26*   CREATININE 1.2 0.9   CALCIUM 9.0 9.4           Significant Imaging:  I have reviewed all pertinent imaging results/findings within the past 24 hours.

## 2019-05-31 PROBLEM — Z74.09 IMPAIRED FUNCTIONAL MOBILITY AND ENDURANCE: Status: ACTIVE | Noted: 2019-05-31

## 2019-05-31 LAB
ANION GAP SERPL CALC-SCNC: 8 MMOL/L (ref 8–16)
BASOPHILS # BLD AUTO: 0.06 K/UL (ref 0–0.2)
BASOPHILS NFR BLD: 0.5 % (ref 0–1.9)
BUN SERPL-MCNC: 21 MG/DL (ref 6–20)
CALCIUM SERPL-MCNC: 9.5 MG/DL (ref 8.7–10.5)
CHLORIDE SERPL-SCNC: 107 MMOL/L (ref 95–110)
CO2 SERPL-SCNC: 27 MMOL/L (ref 23–29)
CREAT SERPL-MCNC: 0.8 MG/DL (ref 0.5–1.4)
DIFFERENTIAL METHOD: ABNORMAL
EOSINOPHIL # BLD AUTO: 0.9 K/UL (ref 0–0.5)
EOSINOPHIL NFR BLD: 7.2 % (ref 0–8)
ERYTHROCYTE [DISTWIDTH] IN BLOOD BY AUTOMATED COUNT: 13.9 % (ref 11.5–14.5)
EST. GFR  (AFRICAN AMERICAN): >60 ML/MIN/1.73 M^2
EST. GFR  (NON AFRICAN AMERICAN): >60 ML/MIN/1.73 M^2
GLUCOSE SERPL-MCNC: 181 MG/DL (ref 70–110)
HCT VFR BLD AUTO: 28.9 % (ref 37–48.5)
HGB BLD-MCNC: 8.7 G/DL (ref 12–16)
IMM GRANULOCYTES # BLD AUTO: 0.05 K/UL (ref 0–0.04)
IMM GRANULOCYTES NFR BLD AUTO: 0.4 % (ref 0–0.5)
LYMPHOCYTES # BLD AUTO: 1.6 K/UL (ref 1–4.8)
LYMPHOCYTES NFR BLD: 13.3 % (ref 18–48)
MCH RBC QN AUTO: 28.7 PG (ref 27–31)
MCHC RBC AUTO-ENTMCNC: 30.1 G/DL (ref 32–36)
MCV RBC AUTO: 95 FL (ref 82–98)
MONOCYTES # BLD AUTO: 0.8 K/UL (ref 0.3–1)
MONOCYTES NFR BLD: 6.6 % (ref 4–15)
NEUTROPHILS # BLD AUTO: 8.9 K/UL (ref 1.8–7.7)
NEUTROPHILS NFR BLD: 72 % (ref 38–73)
NRBC BLD-RTO: 0 /100 WBC
PLATELET # BLD AUTO: 287 K/UL (ref 150–350)
PMV BLD AUTO: 10.7 FL (ref 9.2–12.9)
POCT GLUCOSE: 180 MG/DL (ref 70–110)
POCT GLUCOSE: 182 MG/DL (ref 70–110)
POCT GLUCOSE: 205 MG/DL (ref 70–110)
POCT GLUCOSE: 208 MG/DL (ref 70–110)
POCT GLUCOSE: 219 MG/DL (ref 70–110)
POTASSIUM SERPL-SCNC: 3.8 MMOL/L (ref 3.5–5.1)
RBC # BLD AUTO: 3.03 M/UL (ref 4–5.4)
SODIUM SERPL-SCNC: 142 MMOL/L (ref 136–145)
WBC # BLD AUTO: 12.34 K/UL (ref 3.9–12.7)

## 2019-05-31 PROCEDURE — 97116 GAIT TRAINING THERAPY: CPT

## 2019-05-31 PROCEDURE — 80048 BASIC METABOLIC PNL TOTAL CA: CPT

## 2019-05-31 PROCEDURE — 97535 SELF CARE MNGMENT TRAINING: CPT

## 2019-05-31 PROCEDURE — 99232 SBSQ HOSP IP/OBS MODERATE 35: CPT | Mod: ,,, | Performed by: NURSE PRACTITIONER

## 2019-05-31 PROCEDURE — 99232 PR SUBSEQUENT HOSPITAL CARE,LEVL II: ICD-10-PCS | Mod: ,,, | Performed by: NURSE PRACTITIONER

## 2019-05-31 PROCEDURE — 25000003 PHARM REV CODE 250: Performed by: STUDENT IN AN ORGANIZED HEALTH CARE EDUCATION/TRAINING PROGRAM

## 2019-05-31 PROCEDURE — 99900035 HC TECH TIME PER 15 MIN (STAT)

## 2019-05-31 PROCEDURE — 27000221 HC OXYGEN, UP TO 24 HOURS

## 2019-05-31 PROCEDURE — 63600175 PHARM REV CODE 636 W HCPCS: Performed by: STUDENT IN AN ORGANIZED HEALTH CARE EDUCATION/TRAINING PROGRAM

## 2019-05-31 PROCEDURE — 94761 N-INVAS EAR/PLS OXIMETRY MLT: CPT

## 2019-05-31 PROCEDURE — 99231 PR SUBSEQUENT HOSPITAL CARE,LEVL I: ICD-10-PCS | Mod: GC,,, | Performed by: SURGERY

## 2019-05-31 PROCEDURE — 85025 COMPLETE CBC W/AUTO DIFF WBC: CPT

## 2019-05-31 PROCEDURE — 63600175 PHARM REV CODE 636 W HCPCS: Performed by: NURSE PRACTITIONER

## 2019-05-31 PROCEDURE — 99900026 HC AIRWAY MAINTENANCE (STAT)

## 2019-05-31 PROCEDURE — 20600001 HC STEP DOWN PRIVATE ROOM

## 2019-05-31 PROCEDURE — 99222 1ST HOSP IP/OBS MODERATE 55: CPT | Mod: ,,, | Performed by: NURSE PRACTITIONER

## 2019-05-31 PROCEDURE — 99222 PR INITIAL HOSPITAL CARE,LEVL II: ICD-10-PCS | Mod: ,,, | Performed by: NURSE PRACTITIONER

## 2019-05-31 PROCEDURE — 99231 SBSQ HOSP IP/OBS SF/LOW 25: CPT | Mod: GC,,, | Performed by: SURGERY

## 2019-05-31 PROCEDURE — 27200966 HC CLOSED SUCTION SYSTEM

## 2019-05-31 RX ORDER — INSULIN ASPART 100 [IU]/ML
2 INJECTION, SOLUTION INTRAVENOUS; SUBCUTANEOUS
Status: DISCONTINUED | OUTPATIENT
Start: 2019-06-01 | End: 2019-06-01

## 2019-05-31 RX ORDER — INSULIN ASPART 100 [IU]/ML
2 INJECTION, SOLUTION INTRAVENOUS; SUBCUTANEOUS
Status: DISCONTINUED | OUTPATIENT
Start: 2019-05-31 | End: 2019-06-01

## 2019-05-31 RX ORDER — GLUCAGON 1 MG
1 KIT INJECTION
Status: DISCONTINUED | OUTPATIENT
Start: 2019-05-31 | End: 2019-06-05 | Stop reason: HOSPADM

## 2019-05-31 RX ORDER — INSULIN ASPART 100 [IU]/ML
1-10 INJECTION, SOLUTION INTRAVENOUS; SUBCUTANEOUS EVERY 4 HOURS PRN
Status: DISCONTINUED | OUTPATIENT
Start: 2019-05-31 | End: 2019-06-05 | Stop reason: HOSPADM

## 2019-05-31 RX ADMIN — RIFAMPIN 300 MG: 600 INJECTION, POWDER, LYOPHILIZED, FOR SOLUTION INTRAVENOUS at 04:05

## 2019-05-31 RX ADMIN — INSULIN ASPART 2 UNITS: 100 INJECTION, SOLUTION INTRAVENOUS; SUBCUTANEOUS at 12:05

## 2019-05-31 RX ADMIN — INSULIN ASPART 2 UNITS: 100 INJECTION, SOLUTION INTRAVENOUS; SUBCUTANEOUS at 04:05

## 2019-05-31 RX ADMIN — CHLORHEXIDINE GLUCONATE 0.12% ORAL RINSE 15 ML: 1.2 LIQUID ORAL at 08:05

## 2019-05-31 RX ADMIN — OXYCODONE HYDROCHLORIDE 5 MG: 5 TABLET ORAL at 02:05

## 2019-05-31 RX ADMIN — HYDRALAZINE HYDROCHLORIDE 10 MG: 20 INJECTION INTRAMUSCULAR; INTRAVENOUS at 08:05

## 2019-05-31 RX ADMIN — CEFAZOLIN 2 G: 1 INJECTION, POWDER, FOR SOLUTION INTRAMUSCULAR; INTRAVENOUS at 01:05

## 2019-05-31 RX ADMIN — OXYCODONE HYDROCHLORIDE 5 MG: 5 TABLET ORAL at 08:05

## 2019-05-31 RX ADMIN — MICONAZOLE NITRATE: 20 OINTMENT TOPICAL at 09:05

## 2019-05-31 RX ADMIN — CEFAZOLIN 2 G: 1 INJECTION, POWDER, FOR SOLUTION INTRAMUSCULAR; INTRAVENOUS at 09:05

## 2019-05-31 RX ADMIN — MICONAZOLE NITRATE: 20 OINTMENT TOPICAL at 08:05

## 2019-05-31 RX ADMIN — AMLODIPINE BESYLATE 10 MG: 10 TABLET ORAL at 08:05

## 2019-05-31 RX ADMIN — HYDROMORPHONE HYDROCHLORIDE 1 MG: 1 INJECTION, SOLUTION INTRAMUSCULAR; INTRAVENOUS; SUBCUTANEOUS at 12:05

## 2019-05-31 RX ADMIN — HYDROMORPHONE HYDROCHLORIDE 1 MG: 1 INJECTION, SOLUTION INTRAMUSCULAR; INTRAVENOUS; SUBCUTANEOUS at 06:05

## 2019-05-31 RX ADMIN — RIFAMPIN 300 MG: 600 INJECTION, POWDER, LYOPHILIZED, FOR SOLUTION INTRAVENOUS at 03:05

## 2019-05-31 RX ADMIN — INSULIN ASPART 2 UNITS: 100 INJECTION, SOLUTION INTRAVENOUS; SUBCUTANEOUS at 09:05

## 2019-05-31 RX ADMIN — INSULIN ASPART 2 UNITS: 100 INJECTION, SOLUTION INTRAVENOUS; SUBCUTANEOUS at 08:05

## 2019-05-31 RX ADMIN — CEFAZOLIN 2 G: 1 INJECTION, POWDER, FOR SOLUTION INTRAMUSCULAR; INTRAVENOUS at 05:05

## 2019-05-31 RX ADMIN — PANTOPRAZOLE SODIUM 40 MG: 40 GRANULE, DELAYED RELEASE ORAL at 08:05

## 2019-05-31 RX ADMIN — HYDRALAZINE HYDROCHLORIDE 10 MG: 20 INJECTION INTRAMUSCULAR; INTRAVENOUS at 05:05

## 2019-05-31 NOTE — PROGRESS NOTES
[facility]  Urology  Progress Note    Patient Name: Mariann Huff  MRN: [id number]  Admission Date: 4/20/2019  Hospital Length of Stay: 41 days  Code Status: Full Code   Attending Provider: Robin Boyd MD   Primary Care Physician: Jasbir Haney MD    Subjective:     HPI:  Mariann Huff is a 59 y.o. female with history of HTN, type 2 diabetes mellitus, CAD, NSTEMI, and back pain who presents to [facility] with altered mental status and sepsis. She underwent L5-S1 OLIF with NSGY on 4/12 and had intraoperative left ureteral injury with ureteroureteral anastomosis and ureteral stent placement. She did well initially and had correa and MADHURI drain removed on 4/16. She began having chills and altered mental status 2 days ago and this has progressively worsened. No complaints of pain.     She is altered and HPI is limited. In the ED she is febrile to 103 and tachycardic and pressures are low normal. WBC is 5, creatinine is 3.6 baseline 1.0, lactate is 4.6. Cath UA concerning for infection, 3+ LE, >100 WBCs, and many bacteria on microscopy.    CT and MRI abdomen both show air with minimal fluid near the surgical site with left ureter coursing through. There is air throughout the proximal collecting system which is decompressed with JJ ureteral stent in good position.    Taken for ex lap, ligation of left ureter and left neph tube placement on 4/21/19.    Interval History: No acute events. Tolerating trach collar. Adequate UOP, Cr stable at 0.8. H&H stable.    Review of Systems  Objective:     Temp:  [99 °F (37.2 °C)-99.5 °F (37.5 °C)] 99 °F (37.2 °C)  Pulse:  [] 105  Resp:  [5-38] 22  SpO2:  [91 %-100 %] 97 %  BP: (124-182)/() 173/81     Body mass index is 26.05 kg/m².      Bladder Scan Volume (mL): 27 mL (05/10/19 0846)  Post Void Cath Residual Output (mL): 27 mL (05/10/19 0846)    Drains     Drain                 Nephrostomy 04/21/19 0629 Left 8 Fr. 40 days         Gastrostomy/Enterostomy 05/02/19  1134 Percutaneous endoscopic gastrostomy (PEG) LUQ decompression;feeding 28 days         Urethral Catheter 05/29/19 0151 2 days                Physical Exam   Constitutional: She appears well-developed. No distress.   HENT:   Head: Normocephalic and atraumatic.   Neck: No JVD present.   Cardiovascular: Normal rate and regular rhythm.    Pulmonary/Chest: Effort normal. No respiratory distress.   On vent   Abdominal: Soft. She exhibits no distension. There is no rebound and no guarding.   Incision c/d/i   G-tube   Genitourinary:   Genitourinary Comments: Correa in place draining clear yellow urine  Left neph tube with clear yellow urine   Neurological: She is alert.   Skin: Skin is warm and dry. She is not diaphoretic. No pallor.         Significant Labs:    BMP:  Recent Labs   Lab 05/29/19  0956 05/30/19  0400 05/31/19  0535    145 142   K 4.0 3.6 3.8    109 107   CO2 25 26 27   BUN 28* 26* 21*   CREATININE 1.2 0.9 0.8   CALCIUM 9.0 9.4 9.5       CBC:   Recent Labs   Lab 05/29/19  0321 05/30/19  0400 05/31/19  0535   WBC 11.26 10.42 12.34   HGB 8.2* 8.5* 8.7*   HCT 27.1* 28.3* 28.9*    292 287     Significant Imaging:  All pertinent imaging results/findings from the past 24 hours have been reviewed.                  Assessment/Plan:     * Ureteral transection of left native kidney  Mariann Huff is a 59 y.o. female s/p left ureteral injury on 4/12, presented with fever, AMS, and intraabdominal abscess, taken for washout and ligation of left ureter with neph tube placement on 4/21, Trach/PEG 5/2.  - on trach collar currently  - continue TF  - ID on board, appreciate recs:   - continue rifampin; completed cefepime x 7 days, now on Ancef; continue rifampin and Ancef until 6/18/19   - 5/13 UCx growing Candida albicans; 7 day course of diflucan 800 mg   - 5/19 BAL growing Pseudomonas   - ID recommends reimaging patient should she continue to show signs of infection  - Maintain neph tube and corrae;  patient failed VT on 5/28/19  - Urine output adequate, Cr stable and WNL  - diuresis/fluids per SICU  - bloody BM - flexible sigmoidoscopy on 5/21/19 showed no signs of bleeding, Colonoscopy showed healing rectal ulcer, EGD not indicated per GI; H&H stable  - heparin for RUE held due to GI bleeding, patient found on 5/29 to have RLE DVT; IVC filter placed 5/29    Dispo: patient stable on trach collar, likely stable for stepdown    Sepsis  As above        VTE Risk Mitigation (From admission, onward)        Ordered     IP VTE LOW RISK PATIENT  Once      05/08/19 1315     Place sequential compression device  Until discontinued      04/20/19 0447          Mook Ferguson MD  Urology  [facility]

## 2019-05-31 NOTE — HOSPITAL COURSE
5/29/19: Bed mobility ModA.  Sit to stand ModA & RW x 4 trials.  Ambulated  4 sidesteps x 3 trials then 2 steps fwd/bwd ModA & RW.    05/30/2019: Bed mobility Min-ModA.  Sit to stand Sukhi and transfers MaxA.  Ambulated 12 ft x 3 trials ModA & RW.  UBD and LBD MaxA.  05/31/2019: Bed mobility Sukhi .  Sit to stand Sukhi-ModA & RW.  Ambulated 12 ft x2 trials ModA.  UBD and LBD ModA. Grooming setupA.  06/03/2019: Bed mobility SBA-ModA.  Sit to stand Sukhi-ModA & RW. Trxs ModA & RW. Ambulated 25 ft x 3 trials Sukhi & RW.  Grooming setupA.

## 2019-05-31 NOTE — ASSESSMENT & PLAN NOTE
Mariann Huff is a 59 y.o. female s/p left ureteral injury on 4/12, who presented with fever, AMS, and intraabdominal abscess, taken for washout and ligation of left ureter with nephrostomy tube placement on 4/21. S/p Trach/PEG on 5/2. Episode of negative pressure pulmonary edema on 5/8 requiring mechanical ventilation, diuresis and low dose pressor. Has been plagued by intermittent BRBPR.     Neuro:   - off sedation  - PRN oxy, tylenol  - responds to questions appropriately by nodding  - no focal deficit     Pulmonary:   - CPAP vs trach collar, will place on trach collar today  - ABGs prn  - diuresis as tolerates     Cardiac:   - alternating HTN and hypotension  - TTE 5/8 with no evidence of right heart strain or significant valvular pathology, normal LV systolic function, EF 70%     Renal:    - S/p transection of left ureter 4/12 and subsequent ligation and PCT nephrostomy tube placement with IR 4/21  - Fontenot removed 5/15, removed but failed VT on 5/28   - Fontenot replaced 5/29  - Monitor I/Os  - Diurese PRN     ID:   - AF  - WBC stable  - Blood 5/10 NGTD  - Ucx 5/13 candiduria  - BAL 5/18 pseudomonas  - C diff 5/5 negative  - on fluconazole, ancef, rifampin     Hem/Onc:   - 5/24- 1U PRBC given  - H/H stable overnight  - Continue to check CBC daily      Endocrine:  - DM Type 2  - LDSSI  - Endocrine following for assistance with blood glucose control.      Fluids/Electrolytes/Nutrition/GI:   - Free water flushes 300 cc q6h  - Replace electrolytes PRN  - Currently TF's at goal    # Rectal Ulcers  - intermittent hematochezia; ulcers seen to be improving on partial colonoscopy 5/21  - no bleeding in last 24 hours  - anoscopy and hemostatic agent applied by CRS 5/15  - imodium QID  - C scope by CRS 5/24 - no active bleed, healing rectal ulcer      PPx:  - DVT: IVC filter     DISPO:  - step down/SNF today  - trach collar   - NO labs!

## 2019-05-31 NOTE — PT/OT/SLP PROGRESS
Occupational Therapy   Treatment    Name: Mariann Huff  MRN: [id number]  Admitting Diagnosis:  Ureteral transection of left native kidney  7 Days Post-Op    Recommendations:     Discharge Recommendations: rehabilitation facility  Discharge Equipment Recommendations:  (TBD at next level of care)  Barriers to discharge:  Decreased caregiver support    Assessment:     Mariann Huff is a 59 y.o. female with a medical diagnosis of Ureteral transection of left native kidney.  She presents with increased pain this session in R LE 2* DVT (pt s/p IVC filter). Performance deficits affecting function are weakness, impaired self care skills, impaired balance, impaired functional mobilty, gait instability, impaired endurance, impaired cardiopulmonary response to activity, decreased lower extremity function, pain.     Rehab Prognosis:  Good; patient would benefit from acute skilled OT services to address these deficits and reach maximum level of function.       Plan:     Patient to be seen 4 x/week to address the above listed problems via self-care/home management, therapeutic activities, therapeutic exercises  · Plan of Care Expires: 06/05/19  · Plan of Care Reviewed with: patient    Subjective     Pain/Comfort:  · Pain Rating 1: 9/10  · Location - Side 1: Right  · Location - Orientation 1: posterior  · Location 1: leg  · Pain Addressed 1: Reposition, Distraction, Cessation of Activity, Nurse notified  · Pain Rating Post-Intervention 1: 9/10    Objective:     Communicated with: RN prior to session.  Patient found supine with tracheostomy, telemetry, pulse ox (continuous), PICC line, correa catheter, oxygen, blood pressure cuff(nephrostomy tube) upon OT entry to room.    General Precautions: Standard, fall   Orthopedic Precautions:N/A   Braces: LSO     Occupational Performance:     Bed Mobility:    · Patient completed Rolling/Turning to Right with minimum assistance  · Patient completed Scooting/Bridging with minimum  assistance  · Patient completed Supine to Sit with moderate assistance     Functional Mobility/Transfers:  · Patient completed Sit <> Stand Transfer with minimum assistance  with  rolling walker   · Patient completed Bed <> Chair Transfer using Step Transfer technique with minimum assistance with rolling walker  · Functional Mobility: Minimal A x 8 feet to chair using RW with mod A needed for walker management during turning    Activities of Daily Living:  · Grooming: set-up A in chair; attempted in standing, but pt unable to tolerate    · Upper Body Dressing: moderate assistance seated EOB for donning back gown and TLSO  · Lower Body Dressing: moderate assistance donning socks      AMPAC 6 Click ADL: 14    Treatment & Education:  Pt ed on OT POC; pt in agreement  Pt stood at b/s table from EOB for grooming tasks  Pt noted to kick R LE forward upon standing making pt lean back in standing; pt sat and stood again with same result; pt tolerated standing during self-care with mod A for balance x ~45 seconds; pt completed task in sitting    Patient left up in chair with all lines intact, call button in reach and RN notifiedEducation:      GOALS:   Multidisciplinary Problems     Occupational Therapy Goals        Problem: Occupational Therapy Goal    Goal Priority Disciplines Outcome Interventions   Occupational Therapy Goal     OT, PT/OT Ongoing (interventions implemented as appropriate)    Description:  Goals to be met by: 6/5/19     Patient will increase functional independence with ADLs by performing:    UE Dressing with Set-up Assistance.  LE Dressing with Maximum Assistance and Assistive Devices as needed.  Grooming while standing with Minimal Assistance.  Toileting from bedside commode with Minimal Assistance for hygiene and clothing management.   Sitting at edge of bed x10 minutes with Supervision.  Rolling to Bilateral with Minimal Assistance.   Supine to sit with Minimal Assistance.  Toilet transfer to bedside  commode with Minimal Assistance.                       Time Tracking:     OT Date of Treatment: 05/31/19  OT Start Time: 0721  OT Stop Time: 0749  OT Total Time (min): 28 min    Billable Minutes:Self Care/Home Management 28    PRIMITIVO Dash  5/31/2019

## 2019-05-31 NOTE — SUBJECTIVE & OBJECTIVE
Interval History: No acute events. Tolerating trach collar. Adequate UOP, Cr stable at 0.8. H&H stable.    Review of Systems  Objective:     Temp:  [99 °F (37.2 °C)-99.5 °F (37.5 °C)] 99 °F (37.2 °C)  Pulse:  [] 105  Resp:  [5-38] 22  SpO2:  [91 %-100 %] 97 %  BP: (124-182)/() 173/81     Body mass index is 26.05 kg/m².      Bladder Scan Volume (mL): 27 mL (05/10/19 0846)  Post Void Cath Residual Output (mL): 27 mL (05/10/19 0846)    Drains     Drain                 Nephrostomy 04/21/19 0629 Left 8 Fr. 40 days         Gastrostomy/Enterostomy 05/02/19 1134 Percutaneous endoscopic gastrostomy (PEG) LUQ decompression;feeding 28 days         Urethral Catheter 05/29/19 0151 2 days                Physical Exam   Constitutional: She appears well-developed. No distress.   HENT:   Head: Normocephalic and atraumatic.   Neck: No JVD present.   Cardiovascular: Normal rate and regular rhythm.    Pulmonary/Chest: Effort normal. No respiratory distress.   On vent   Abdominal: Soft. She exhibits no distension. There is no rebound and no guarding.   Incision c/d/i   G-tube   Genitourinary:   Genitourinary Comments: Fontenot in place draining clear yellow urine  Left neph tube with clear yellow urine   Neurological: She is alert.   Skin: Skin is warm and dry. She is not diaphoretic. No pallor.         Significant Labs:    BMP:  Recent Labs   Lab 05/29/19  0956 05/30/19  0400 05/31/19  0535    145 142   K 4.0 3.6 3.8    109 107   CO2 25 26 27   BUN 28* 26* 21*   CREATININE 1.2 0.9 0.8   CALCIUM 9.0 9.4 9.5       CBC:   Recent Labs   Lab 05/29/19  0321 05/30/19  0400 05/31/19  0535   WBC 11.26 10.42 12.34   HGB 8.2* 8.5* 8.7*   HCT 27.1* 28.3* 28.9*    292 287     Significant Imaging:  All pertinent imaging results/findings from the past 24 hours have been reviewed.

## 2019-05-31 NOTE — PROGRESS NOTES
"[facility]  Endocrinology  Progress Note    Admit Date: 4/20/2019     Reason for Consult: Management of T2DM, Hyperglycemia     Surgical Procedure and Date:     04/12/2019: L5-S1 fusion per Dr George in [facility]     04/21/2019:         1. Exploratory laparotomy        2. Ligation of left ureter        3. Removal of left JJ ureteral stent        Diabetes diagnosis year: ANDRE    Home Diabetes Medications:  ANDRE  Toujeo 50 BID  Invokana 300mg daily   Novolog 35 units AM, 45 units PM, 35 units PM    How often checking glucose at home? ANDRE   BG readings on regimen: ANDRE  Hypoglycemia on the regimen?  ANDRE  Missed doses on regimen?  ANDRE    Diabetes Complications include:     Hyperglycemia and Diabetic retinopathy     Complicating diabetes co morbidities:   History of MI and MURTAZA, CAD, HLD    HPI:   Patient is a 59 y.o. female with a diagnosis of HTN, HLN, DM type 2, nonproliferative diabetic renopathy, CAD, NSTEMI, and back pain who presents to the ED with a complaint of altered mental status on 04/20/2019. Is now s/p Exploratory laparotomy, Ligation of left ureter, and Removal of left JJ ureteral stent. Endocrinology consulted to manage DM2/Hyperglycemia.    Lab Results   Component Value Date    HGBA1C 10.8 (H) 02/19/2019       Interval HPI:   Overnight events:  NAEON. BG is now trending upward, and is now above goal on TF. Hemodynamic complications as per chart review.    Eating:   NPO  Nausea: No  Hypoglycemia and intervention: No  Fever: No  TPN and/or TF: Yes (TF)  If yes, type of TF/TPN and rate: 45 ml/hr    BP (!) 154/73 (BP Location: Left arm, Patient Position: Lying)   Pulse 110   Temp 98.7 °F (37.1 °C) (Oral)   Resp (!) 35   Ht 5' 5" (1.651 m)   Wt 71 kg (156 lb 8.4 oz)   SpO2 98%   Breastfeeding? No   BMI 26.05 kg/m²      Labs Reviewed and Include    Recent Labs   Lab 05/31/19  0535   *   CALCIUM 9.5      K 3.8   CO2 27      BUN 21*   CREATININE 0.8     Lab Results "   Component Value Date    WBC 12.34 05/31/2019    HGB 8.7 (L) 05/31/2019    HCT 28.9 (L) 05/31/2019    MCV 95 05/31/2019     05/31/2019     No results for input(s): TSH, FREET4 in the last 168 hours.  Lab Results   Component Value Date    HGBA1C 5.4 05/24/2019       Nutritional status:   Body mass index is 26.05 kg/m².  Lab Results   Component Value Date    ALBUMIN 1.5 (L) 05/28/2019    ALBUMIN 1.5 (L) 05/27/2019    ALBUMIN 1.6 (L) 05/26/2019     No results found for: PREALBUMIN    Estimated Creatinine Clearance: 75.8 mL/min (based on SCr of 0.8 mg/dL).    Accu-Checks  Recent Labs     05/30/19  0117 05/30/19  0403 05/30/19  0406 05/30/19  0815 05/30/19  1221 05/30/19  1555 05/30/19  2116 05/30/19  2354 05/31/19  0439 05/31/19  0802   POCTGLUCOSE 143* 168* 145* 138* 164* 161* 180* 198* 208* 219*       Current Medications and/or Treatments Impacting Glycemic Control  Immunotherapy:    Immunosuppressants     None        Steroids:   Hormones (From admission, onward)    None        Pressors:    Autonomic Drugs (From admission, onward)    None        Hyperglycemia/Diabetes Medications:   Antihyperglycemics (From admission, onward)    Start     Stop Route Frequency Ordered    05/24/19 1835  insulin aspart U-100 pen 0-5 Units      -- SubQ Every 6 hours PRN 05/24/19 1735          ASSESSMENT and PLAN    * Ureteral transection of left native kidney  Managed per primary team  Avoid hypoglycemia        Type 2 diabetes mellitus with diabetic peripheral angiopathy without gangrene  BG goal 140 - 180  BG is now trending upward above goal while on TF.   Start Novolog 2 units every 4 hours while on TF. Please hold if TF is stopped. (weight based dosing as per protocol).  Moderate Dose SQ Insulin Correction Scale PRN for BG excursions. Kidney function WNL  BG monitoring q 4 hrs.     ** Please notify Endocrine for any change and/or advance in diet/TF**  ** Please call Endocrine for any BG related issues **    Discharge  Recommendations:     TBD.             CAD (coronary artery disease)  Managed per primary team  Condition may cause insulin resistance       HLD (hyperlipidemia)  Managed per primary team  Condition may cause insulin resistance         Sepsis  Infection may elevate BG readings  Most likely cause for recent BG excursions.   Managed per primary team  Avoid hypoglycemia    Lab Results   Component Value Date    WBC 12.34 05/31/2019    HGB 8.7 (L) 05/31/2019    HCT 28.9 (L) 05/31/2019    MCV 95 05/31/2019     05/31/2019         Sleep apnea  May affect BG readings if uncontrolled        On enteral nutrition  May cause BG excursions.           Uriah Holder, NP  Endocrinology  [facility]

## 2019-05-31 NOTE — ASSESSMENT & PLAN NOTE
-Urology consulted for intraabdominal abscess with fevers and AMS  -s/p left ureteral injury on 4/12, taken for washout and ligation of left ureter with neph tube placement on 4/21

## 2019-05-31 NOTE — PLAN OF CARE
Pt resting comfortably in bed, all VSS at this time  AAO x 4, nods/gestures appropriately, follows commands   On trach collar all day 8L/40%, tolerated well by pt, SpO2 %  Tube feeds currently at goal of 45 cc/hr, no residuals, one episode of nausea, admin PRN zofran x 1  Nephrostomy tube intact, no leakage noted, see flowsheet for output trends  SBP maintained < 180  Plan to transfer to LTAC   Plan of care reviewed with pt and spouse at bedside  Will continue to monitor

## 2019-05-31 NOTE — NURSING TRANSFER
Nursing Transfer Note      5/31/2019     Transfer To: [facility] From SICU [id number]    Transfer via bed    Transfer with O2, cardiac monitoring    Transported by RN [id number]    Medicines sent: yes    Chart send with patient: Yes    Notified: spouse, son    Patient reassessed at: 5/31/19 1800    Upon arrival to floor: cardiac monitor applied, patient oriented to room, call bell in reach and bed in lowest position

## 2019-05-31 NOTE — ASSESSMENT & PLAN NOTE
Infection may elevate BG readings  Most likely cause for recent BG excursions.   Managed per primary team  Avoid hypoglycemia    Lab Results   Component Value Date    WBC 12.34 05/31/2019    HGB 8.7 (L) 05/31/2019    HCT 28.9 (L) 05/31/2019    MCV 95 05/31/2019     05/31/2019

## 2019-05-31 NOTE — HPI
Mariann Huff is a 59-year-old female with PMHx of HTN, DM2, CAD s/p stenting 2014, MURTAZA, and chronic lower back pain s/p left L5-S1 anterior to psoa interbody fusion.  Patient recently underwent a L5-S1 fusion 4/12/19 with Dr George in [facility] c/b L ureter transection, repaired with end to end ureteroureterostomy stent placement by Dr Boyd in urology. Tentative plan was for stent removal in June.   Patient presented to ED on 4/20/19 with the confusion and fevers. She was hypotensive in ED with UA revealing a UTI. Imaging showed air in the L5-S1 post-operative bed and air in the L ureter, possibly communication between post-op bed and ureter.  Urology consulted. S/p washout and ligation of left ureter with neph tube placement on 4/21. Hospital course further complicated by acute resp failure (s/p extubation now with trach collar placed on 5/2, Episode of negative pressure pulmonary edema on 5/8 requiring mechanical ventilation,diuresis and low dose pressors), dysphagia (s/p PEG 5/2), alternating hypotension with HTN, candiduria and pseudomonas infections (ID following,  rifampin and Ancef until 6/18/19, completed 7 day course of diflucan 800 mg), anemia (s/p transfusion 5/24), intermittent hematochezia ( C-scope by CRS 5/24 - no active bleed, healing rectal ulcer), DVT BUE and RLE (heparin held 2/2 recent GI bleed), dermatitis to buttocks, & failed voiding trial on 5/28 (per urology, maintain nephr tube and correa), & pain (Dilaudid IVP this am).     Functional History: Patient lives in [place] with  in a single story home with no steps to enter.  Prior to admission, (I) with ADLs and mobility prior to back surgery 4/12.  After surgery ambulating with RW. Pt has an LSO brace since back sx to use when OOB.  DME: [employer].

## 2019-05-31 NOTE — ASSESSMENT & PLAN NOTE
-ID consulted   -5/13 UCx growing Candida albicans on 5/13; s/p 7 day course of diflucan 800 mg  -5/19 BAL growing Pseudomonas, continue rifampin and Ancef until 6/18/19   - ID recommends reimaging patient should she continue to show signs of infection

## 2019-05-31 NOTE — SUBJECTIVE & OBJECTIVE
Interval History/Significant Events: NAEO. Tolerating trach collar. UOP adequate.    Follow-up For: Procedure(s) (LRB):  COLONOSCOPY (N/A)    Post-Operative Day: 7 Days Post-Op    Objective:     Vital Signs (Most Recent):  Temp: 98.7 °F (37.1 °C) (05/31/19 0700)  Pulse: (!) 111 (05/31/19 0945)  Resp: (!) 28 (05/31/19 0945)  BP: (!) 177/81 (05/31/19 0900)  SpO2: 99 % (05/31/19 0945) Vital Signs (24h Range):  Temp:  [98.7 °F (37.1 °C)-99.5 °F (37.5 °C)] 98.7 °F (37.1 °C)  Pulse:  [] 111  Resp:  [14-38] 28  SpO2:  [91 %-100 %] 99 %  BP: (124-195)/(60-93) 177/81     Weight: 71 kg (156 lb 8.4 oz)  Body mass index is 26.05 kg/m².      Intake/Output Summary (Last 24 hours) at 5/31/2019 1006  Last data filed at 5/31/2019 0900  Gross per 24 hour   Intake 1269.6 ml   Output 2385 ml   Net -1115.4 ml       Physical Exam  Constitutional: She appears well-developed and well-nourished.   HENT:   Head: Normocephalic and atraumatic.   Tracheostomy in place   Eyes: Right eye exhibits no discharge. Left eye exhibits no discharge.   Neck: Normal range of motion. Neck supple.   Cardiovascular: Normal rate and regular rhythm.   Pulmonary/Chest: Effort normal. No respiratory distress.   Abdominal: Soft.   Midline incision c/d/i.  PEG with no leak. Abdomen soft, non-distended.  Bowel sounds present   Genitourinary:   Genitourinary Comments: Nephrostomy tube in place with yellow output.  Fontenot in place draining clear yellow urine   Musculoskeletal: Normal range of motion.   Neurological: She is alert.   Able to follow commands, denying pain.    Skin: Skin is warm and dry.   Psychiatric: She has a normal mood and affect.        Lines/Drains/Airways     Peripherally Inserted Central Catheter Line                 PICC Double Lumen 05/24/19 0900 right brachial 7 days          Drain                 Nephrostomy 04/21/19 0629 Left 8 Fr. 40 days         Gastrostomy/Enterostomy 05/02/19 1134 Percutaneous endoscopic gastrostomy (PEG) LUQ  decompression;feeding 28 days         Urethral Catheter 05/29/19 0151 2 days          Airway                 Surgical Airway 05/02/19 1107 Shiley Cuffed 28 days                Significant Labs:    CBC/Anemia Profile:  Recent Labs   Lab 05/30/19  0400 05/31/19  0535   WBC 10.42 12.34   HGB 8.5* 8.7*   HCT 28.3* 28.9*    287   MCV 96 95   RDW 14.0 13.9        Chemistries:  Recent Labs   Lab 05/30/19  0400 05/31/19  0535    142   K 3.6 3.8    107   CO2 26 27   BUN 26* 21*   CREATININE 0.9 0.8   CALCIUM 9.4 9.5           Significant Imaging:  I have reviewed all pertinent imaging results/findings within the past 24 hours.

## 2019-05-31 NOTE — PT/OT/SLP PROGRESS
"Physical Therapy Treatment    Patient Name:  Mariann Huff   MRN:  [id number]    Recommendations:     Discharge Recommendations:  rehabilitation facility   Discharge Equipment Recommendations: (TBD as pt progresses)   Barriers to discharge: Inaccessible home and Decreased caregiver support    Assessment:     Mariann Huff is a 59 y.o. female admitted with a medical diagnosis of Ureteral transection of left native kidney.  She presents with the following impairments/functional limitations:  weakness, impaired endurance, gait instability, impaired functional mobilty, impaired balance, pain, decreased safety awareness, impaired cardiopulmonary response to activity . Pt is motivated to progress with mobility.    Rehab Prognosis: Good; patient would benefit from acute skilled PT services to address these deficits and reach maximum level of function.    Recent Surgery: Procedure(s) (LRB):  COLONOSCOPY (N/A) 7 Days Post-Op    Plan:     During this hospitalization, patient to be seen 4 x/week to address the identified rehab impairments via gait training, therapeutic exercises, therapeutic activities, neuromuscular re-education and progress toward the following goals:    · Plan of Care Expires:  06/03/19    Subjective   "My R calf hurts"    Pain/Comfort:  · Pain Rating 1: 5/10  · Location - Orientation 1: generalized  · Location 1: back  · Pain Addressed 1: Reposition, Cessation of Activity  · Pain Rating Post-Intervention 1: 5/10      Objective:     Communicated with nurse prior to session.  Patient found up in chair with blood pressure cuff, correa catheter, PEG Tube, oxygen, tracheostomy, telemetry, pulse ox (continuous), peripheral IV upon PT entry to room.     General Precautions: Standard, fall   Orthopedic Precautions:N/A   Braces: LSO     Functional Mobility:  · Bed Mobility:     · Sit to Supine: minimum assistance  · Transfers:     · Sit to Stand:  minimum assistance x 1 trial from bedside chair and moderate " assistance x 2 trials from bedside chair with rolling walker  · Gait: 12ft x 2 trials then 18ft with RW with moderate assist. pt performed gait with flexed trunk, decreased step length, narrow LA[place], and at slow pace.      AM-PAC 6 CLICK MOBILITY  Turning over in bed (including adjusting bedclothes, sheets and blankets)?: 3  Sitting down on and standing up from a chair with arms (e.g., wheelchair, bedside commode, etc.): 2  Moving from lying on back to sitting on the side of the bed?: 2  Moving to and from a bed to a chair (including a wheelchair)?: 2  Need to walk in hospital room?: 2  Climbing 3-5 steps with a railing?: 1  Basic Mobility Total Score: 12     Patient left supine with all lines intact, call button in reach and nurse notified..    GOALS:   Multidisciplinary Problems     Physical Therapy Goals        Problem: Physical Therapy Goal    Goal Priority Disciplines Outcome Goal Variances Interventions   Physical Therapy Goal     PT, PT/OT Ongoing (interventions implemented as appropriate)     Description:  Goals to be met by:19    Patient will increase functional independence with mobility by performin. Supine to sit with Moderate Assistance - met  Revised goal: supine to sit through sidelying with minimal assist-not met  2. Sit to stand transfer with Minimal Assistance -not met  3. Gait  x 30 feet with Contact Guard Assistance using Rolling Walker. -not met  4. Lower extremity exercise program x15 reps , with assistance as needed- met                         Time Tracking:     PT Received On: 19  PT Start Time: 0915     PT Stop Time: 0940  PT Total Time (min): 25 min     Billable Minutes: Gait Training 25    Treatment Type: Treatment  PT/PTA: PT     PTA Visit Number: 0     Anastacia Liriano, PT  2019

## 2019-05-31 NOTE — PROGRESS NOTES
[facility]  Critical Care - Surgery  Progress Note    Patient Name: Mariann Huff  MRN: [id number]  Admission Date: 4/20/2019  Hospital Length of Stay: 41 days  Code Status: Full Code  Attending Provider: Robin Boyd MD  Primary Care Provider: Jasbir Haney MD   Principal Problem: Ureteral transection of left native kidney    Subjective:     Hospital/ICU Course:  The patient has had 2 episodes of bradycardia during the day.  Early in the morning, the patient had difficulty breathing and a mucus plug was found in the inner cannula of the tracheostomy.  She developed bradycardia but never arrested.  She also developed hypotension.  This was followed subsequently after she was bagged (Ambu) with tachypnea and hypertension and tachycardia.  Oxygen saturations were in the low 90s, and the arterial blood gas at that time revealed a significant alveolar arterial gradient with adequate ventilation.  Chest x-ray, which I personally reviewed, revealed what appeared to be a significant fluid overload.  We did perform an echocardiogram urgently, which I personally was present and reviewed the results.  There is no evidence of heart failure.  EKG was reported as normal and there is no evidence that the patient had an acute myocardial event.  The chest x-ray did suggest a significant amount of pulmonary edema. There was also a concern, because the the history of upper body deep venous thrombosis, that this could be a pulmonary embolism.  A CT scan of the chest , which I have personally reviewed show bilateral infiltrates compatible with pulmonary edema of non cardiogenic origin.  It was not possible to rule out a pulmonary embolism.  The patient is anticoagulated currently.  Ultrasound of the lower extremities was done and they do not show any deep venous thrombosis.    The patient did receive some fluids, because of her history of acute kidney injury, which has resolved and because she was receiving IV  contrast.  She has maintained a good urinary output during the day.  She has mostly being hemodynamically stable and her tachycardia resolved.  She remains sedated and on the ventilator.  Follow-up chest x-ray, which I personally reviewed, showed resolving pulmonary edema. Her oxygenation and oxygen saturations improved and we were able to wean the patient FiO2 down to 50%.    Of note, I did perform a bronchoscopy, which failed to reveal any amount of pus or mucus plugging.  There was also no evidence of aspiration.  BAL was performed and was sent.    A 2nd episode of bradycardia was observed in the afternoon.  This responded to the use of atropine and 0.1 mg of epinephrine.  She did have a tachycardic response with this which subsided rapidly.  She remained otherwise hemodynamically stable.  Arterial blood gases, reveal now a PO2 of 300 with adequate pCO2 and pH.  Mild hyperventilation.  She remains comfortable.  I have asked Cardiology to re-evaluate this patient.  For now she remains off pressors.    Neurologically the patient has been intact. She is awake alert and oriented with a nonfocal exam.    Her abdomen remains soft.  She has been NPO for now.  She has not had any further episodes of lower GI bleed.    The patient is having a good urinary output BUN and creatinine have been grossly within normal limits with a slight elevation of BUN.  Electrolytes are being followed and replaced as necessary.    Currently no evidence of infection in this patient.  Empirically I started antibiotics, but will stop them in the next 24-48 hours.    I have had the chance to talk to the patient's  at length and in detail.  I have explained our findings, I will keep him up-to-date as to any progress in understanding the etiology of these episodes and will wait continuing to talk to him as often as necessary.      Interval History/Significant Events: NAEO. Tolerating trach collar. UOP adequate.    Follow-up For:  Procedure(s) (LRB):  COLONOSCOPY (N/A)    Post-Operative Day: 7 Days Post-Op    Objective:     Vital Signs (Most Recent):  Temp: 98.7 °F (37.1 °C) (05/31/19 0700)  Pulse: (!) 111 (05/31/19 0945)  Resp: (!) 28 (05/31/19 0945)  BP: (!) 177/81 (05/31/19 0900)  SpO2: 99 % (05/31/19 0945) Vital Signs (24h Range):  Temp:  [98.7 °F (37.1 °C)-99.5 °F (37.5 °C)] 98.7 °F (37.1 °C)  Pulse:  [] 111  Resp:  [14-38] 28  SpO2:  [91 %-100 %] 99 %  BP: (124-195)/(60-93) 177/81     Weight: 71 kg (156 lb 8.4 oz)  Body mass index is 26.05 kg/m².      Intake/Output Summary (Last 24 hours) at 5/31/2019 1006  Last data filed at 5/31/2019 0900  Gross per 24 hour   Intake 1269.6 ml   Output 2385 ml   Net -1115.4 ml       Physical Exam  Constitutional: She appears well-developed and well-nourished.   HENT:   Head: Normocephalic and atraumatic.   Tracheostomy in place   Eyes: Right eye exhibits no discharge. Left eye exhibits no discharge.   Neck: Normal range of motion. Neck supple.   Cardiovascular: Normal rate and regular rhythm.   Pulmonary/Chest: Effort normal. No respiratory distress.   Abdominal: Soft.   Midline incision c/d/i.  PEG with no leak. Abdomen soft, non-distended.  Bowel sounds present   Genitourinary:   Genitourinary Comments: Nephrostomy tube in place with yellow output.  Fontenot in place draining clear yellow urine   Musculoskeletal: Normal range of motion.   Neurological: She is alert.   Able to follow commands, denying pain.    Skin: Skin is warm and dry.   Psychiatric: She has a normal mood and affect.        Lines/Drains/Airways     Peripherally Inserted Central Catheter Line                 PICC Double Lumen 05/24/19 0900 right brachial 7 days          Drain                 Nephrostomy 04/21/19 0629 Left 8 Fr. 40 days         Gastrostomy/Enterostomy 05/02/19 1134 Percutaneous endoscopic gastrostomy (PEG) LUQ decompression;feeding 28 days         Urethral Catheter 05/29/19 0151 2 days          Airway                  Surgical Airway 05/02/19 1107 Shiley Cuffed 28 days                Significant Labs:    CBC/Anemia Profile:  Recent Labs   Lab 05/30/19  0400 05/31/19  0535   WBC 10.42 12.34   HGB 8.5* 8.7*   HCT 28.3* 28.9*    287   MCV 96 95   RDW 14.0 13.9        Chemistries:  Recent Labs   Lab 05/30/19  0400 05/31/19  0535    142   K 3.6 3.8    107   CO2 26 27   BUN 26* 21*   CREATININE 0.9 0.8   CALCIUM 9.4 9.5           Significant Imaging:  I have reviewed all pertinent imaging results/findings within the past 24 hours.    Assessment/Plan:     * Ureteral transection of left native kidney  Elizaamanda Huff is a 59 y.o. female s/p left ureteral injury on 4/12, who presented with fever, AMS, and intraabdominal abscess, taken for washout and ligation of left ureter with nephrostomy tube placement on 4/21. S/p Trach/PEG on 5/2. Episode of negative pressure pulmonary edema on 5/8 requiring mechanical ventilation, diuresis and low dose pressor. Has been plagued by intermittent BRBPR.     Neuro:   - off sedation  - PRN oxy, tylenol  - responds to questions appropriately by nodding  - no focal deficit     Pulmonary:   - tolerating trach collar       Cardiac:   - alternating HTN and hypotension  - TTE 5/8 with no evidence of right heart strain or significant valvular pathology, normal LV systolic function, EF 70%     Renal:    - S/p transection of left ureter 4/12 and subsequent ligation and PCT nephrostomy tube placement with IR 4/21  - Fontenot removed 5/15, removed but failed VT on 5/28   - Fontenot replaced 5/29  - Monitor I/Os  - Diurese PRN     ID:   - AF  - WBC stable  - Blood 5/10 NGTD  - Ucx 5/13 candiduria  - BAL 5/18 pseudomonas  - C diff 5/5 negative  - on fluconazole, ancef, rifampin     Hem/Onc:   - 5/24- 1U PRBC given  - H/H stable overnight       Endocrine:  - DM Type 2  - LDSSI  - Endocrine following for assistance with blood glucose control.      Fluids/Electrolytes/Nutrition/GI:   - Free water flushes  300 cc q6h  - Replace electrolytes PRN  - Currently TF's at goal    # Rectal Ulcers  - intermittent hematochezia; ulcers seen to be improving on partial colonoscopy 5/21  - no bleeding in last 24 hours  - anoscopy and hemostatic agent applied by CRS 5/15  - imodium QID  - C scope by CRS 5/24 - no active bleed, healing rectal ulcer      PPx:  - DVT: IVC filter     DISPO:  - step down/SNF today  - trach collar   - NO labs!                Critical care was time spent personally by me on the following activities: development of treatment plan with patient or surrogate and bedside caregivers, discussions with consultants, evaluation of patient's response to treatment, examination of patient, ordering and performing treatments and interventions, ordering and review of laboratory studies, ordering and review of radiographic studies, pulse oximetry, re-evaluation of patient's condition.  This critical care time did not overlap with that of any other provider or involve time for any procedures.     Ehsan Espino MD  Critical Care - Surgery  [facility]

## 2019-05-31 NOTE — ASSESSMENT & PLAN NOTE
Mariann Huff is a 59 y.o. female s/p left ureteral injury on 4/12, presented with fever, AMS, and intraabdominal abscess, taken for washout and ligation of left ureter with neph tube placement on 4/21, Trach/PEG 5/2.  - on trach collar currently  - continue TF  - ID on board, appreciate recs:   - continue rifampin; completed cefepime x 7 days, now on Ancef; continue rifampin and Ancef until 6/18/19   - 5/13 UCx growing Candida albicans; 7 day course of diflucan 800 mg   - 5/19 BAL growing Pseudomonas   - ID recommends reimaging patient should she continue to show signs of infection  - Maintain neph tube and correa; patient failed VT on 5/28/19  - Urine output adequate, Cr stable and WNL  - diuresis/fluids per SICU  - bloody BM - flexible sigmoidoscopy on 5/21/19 showed no signs of bleeding, Colonoscopy showed healing rectal ulcer, EGD not indicated per GI; H&H stable  - heparin for RUE held due to GI bleeding, patient found on 5/29 to have RLE DVT; IVC filter placed 5/29    Dispo: patient stable on trach collar, likely stable for stepdown

## 2019-05-31 NOTE — PLAN OF CARE
Problem: Occupational Therapy Goal  Goal: Occupational Therapy Goal  Goals to be met by: 6/5/19     Patient will increase functional independence with ADLs by performing:    UE Dressing with Set-up Assistance.  LE Dressing with Maximum Assistance and Assistive Devices as needed.  Grooming while standing with Minimal Assistance.  Toileting from bedside commode with Minimal Assistance for hygiene and clothing management.   Sitting at edge of bed x10 minutes with Supervision.  Rolling to Bilateral with Minimal Assistance.   Supine to sit with Minimal Assistance.  Toilet transfer to bedside commode with Minimal Assistance.      Outcome: Ongoing (interventions implemented as appropriate)  The above goals remain appropriate. PRIMITIVO Dash  5/31/2019

## 2019-05-31 NOTE — PLAN OF CARE
Problem: Adult Inpatient Plan of Care  Goal: Plan of Care Review  Outcome: Ongoing (interventions implemented as appropriate)  No acute events overnight.  Pt remains AAOx4, free from all falls and injuries.  VSS, pt is on trach collar 8L 40% tolerating well.  Pain controlled with Dilaudid IVP.  TF at goal of 45 through PEG, no complaints of discomfort. Minimal UOP from correa, moderate output from Nephrostomy (see flowsheets for I/O and assessments).  Plan is to wean O2 and possible transfer to LTAC.  POC reviewed with pt and  at the bedside.  All questions and concerns addressed.  Will continue to monitor.

## 2019-05-31 NOTE — ASSESSMENT & PLAN NOTE
Recommendations  -  Encourage mobility, OOB in chair at least 3 hours per day, and early ambulation as appropriate  -  PT/OT evaluate and treat  -  Pain management  -  Monitor for and prevent skin breakdown and pressure ulcers  · Early mobility, repositioning/weight shifting every 20-30 minutes when sitting, turn patient every 2 hours, proper mattress/overlay and chair cushioning, pressure relief/heel protector boots  -  DVT prophylaxis    -  Reviewed discharge options (IP rehab, SNF, HH therapy, and OP therapy)

## 2019-05-31 NOTE — SUBJECTIVE & OBJECTIVE
"Interval HPI:   Overnight events:  NAEON. BG is now trending upward, and is now above goal on TF. Hemodynamic complications as per chart review.    Eating:   NPO  Nausea: No  Hypoglycemia and intervention: No  Fever: No  TPN and/or TF: Yes (TF)  If yes, type of TF/TPN and rate: 45 ml/hr    BP (!) 154/73 (BP Location: Left arm, Patient Position: Lying)   Pulse 110   Temp 98.7 °F (37.1 °C) (Oral)   Resp (!) 35   Ht 5' 5" (1.651 m)   Wt 71 kg (156 lb 8.4 oz)   SpO2 98%   Breastfeeding? No   BMI 26.05 kg/m²     Labs Reviewed and Include    Recent Labs   Lab 05/31/19  0535   *   CALCIUM 9.5      K 3.8   CO2 27      BUN 21*   CREATININE 0.8     Lab Results   Component Value Date    WBC 12.34 05/31/2019    HGB 8.7 (L) 05/31/2019    HCT 28.9 (L) 05/31/2019    MCV 95 05/31/2019     05/31/2019     No results for input(s): TSH, FREET4 in the last 168 hours.  Lab Results   Component Value Date    HGBA1C 5.4 05/24/2019       Nutritional status:   Body mass index is 26.05 kg/m².  Lab Results   Component Value Date    ALBUMIN 1.5 (L) 05/28/2019    ALBUMIN 1.5 (L) 05/27/2019    ALBUMIN 1.6 (L) 05/26/2019     No results found for: PREALBUMIN    Estimated Creatinine Clearance: 75.8 mL/min (based on SCr of 0.8 mg/dL).    Accu-Checks  Recent Labs     05/30/19  0117 05/30/19  0403 05/30/19  0406 05/30/19  0815 05/30/19  1221 05/30/19  1555 05/30/19  2116 05/30/19  2354 05/31/19  0439 05/31/19  0802   POCTGLUCOSE 143* 168* 145* 138* 164* 161* 180* 198* 208* 219*       Current Medications and/or Treatments Impacting Glycemic Control  Immunotherapy:    Immunosuppressants     None        Steroids:   Hormones (From admission, onward)    None        Pressors:    Autonomic Drugs (From admission, onward)    None        Hyperglycemia/Diabetes Medications:   Antihyperglycemics (From admission, onward)    Start     Stop Route Frequency Ordered    05/24/19 6091  insulin aspart U-100 pen 0-5 Units      -- SubQ Every " 6 hours PRN 05/24/19 1736

## 2019-05-31 NOTE — ASSESSMENT & PLAN NOTE
BG goal 140 - 180  BG is now trending upward above goal while on TF.   Start Novolog 2 units every 4 hours while on TF. Please hold if TF is stopped. (weight based dosing as per protocol).  Moderate Dose SQ Insulin Correction Scale PRN for BG excursions. Kidney function WNL  BG monitoring q 4 hrs.     ** Please notify Endocrine for any change and/or advance in diet/TF**  ** Please call Endocrine for any BG related issues **    Discharge Recommendations:     TBD.

## 2019-05-31 NOTE — SUBJECTIVE & OBJECTIVE
Past Medical History:   Diagnosis Date    Anticoagulant long-term use     Asthma     Back pain     Bradycardia, unspecified 2019    The etiology of the bradycardic episode is unclear.  The have appear to be respiratory in origin (at least the 1st episode).  We will continue to monitor carefully.  We are awaiting evaluation by Cardiology.      CAD (coronary artery disease)     s/p stentimg  (2), (1)    Carotid artery stenosis     Diabetes mellitus type 2 in obese     HTN (hypertension), benign     Hyperlipidemia     Keloid cicatrix     NPDR (nonproliferative diabetic retinopathy) 2015    NSTEMI (non-ST elevated myocardial infarction)     Nuclear sclerosis - Right Eye 3/18/2014    Primary localized osteoarthrosis, lower leg 2014    Sleep apnea      Past Surgical History:   Procedure Laterality Date    BRONCHOSCOPY N/A 2019    Performed by Sean Ruano MD at [facility] OR 2ND FLR    CARDIAC CATHETERIZATION      cataract extraction left eye      cataracts      CATHETERIZATION, HEART, LEFT Left 2014    Performed by Wilman Kim MD at [facility] CATH LAB     SECTION, LOW TRANSVERSE      COLONOSCOPY N/A 2019    Performed by Al Alaniz MD at [facility] ENDO (2ND FLR)    CORONARY ANGIOPLASTY      Creation, Nephrostomy, Percutaneous Left 2019    Performed by Karina Surgeon at [facility] KARINA    CREATION, TRACHEOSTOMY N/A 2019    Performed by Sean Ruano MD at [facility] OR 2ND FLR    EGD, WITH PEG TUBE INSERTION  2019    Performed by Sean Ruano MD at [facility] OR 2ND FLR    ESOPHAGOGASTRODUODENOSCOPY (EGD) N/A 2016    Performed by Gardenia Adamson MD at [facility] ENDO (4TH FLR)    EXCISION TURBINATE, SUBMUCOUS      FUSION, SPINE, LUMBAR, ANTERIOR APPROACH Left L5-S1 Anterior to Psoa Interbody Fusion, L5-S1 Posterior Instrumentation Left 2019    Performed by Mk eGorge MD at [facility] OR 2ND FLR    HAND SURGERY Left     HAND SURGERY  Right     torn ligament repair/ Dr. Yeboah    HYSTERECTOMY      Injection,steroid,epidural,transforaminal approach - Bilateral - S1 Bilateral 9/25/2018    Performed by Tl Abreu MD at [facility] PAIN MGT    Injection-steroid-epidural-caudal N/A 2/7/2019    Performed by Dave Bentley Jr., MD at [facility] PAIN MGT    INSERTION-INTRAOCULAR LENS (IOL) Right 9/1/2015    Performed by Good [facility]ingo MD at [facility] OR 1ST FLR    LAPAROTOMY, EXPLORATORY; LIGATION OF LEFT URETER; DOUBLE J STENT REMOVAL LEFT URETER Left 4/20/2019    Performed by Paul Carlson MD at [facility] OR 2ND FLR    left foot surgery      left wrist surgery      NASAL SEPTUM SURGERY  5/7/15    PHACOEMULSIFICATION-ASPIRATION-CATARACT Right 9/1/2015    Performed by Good [facility]ingo MD at [facility] OR 1ST FLR    REPAIR, URETER  4/12/2019    [facility]    RESECTION-TURBINATES (SMR) N/A 5/7/2015    Performed by Dileep Dubois III, MD at [facility] OR 2ND FLR    rt elbow surgery      S/P LAD COATED STENT  05/14/2010    6 total     S/P OM1 STENT  08/2003    SEPTOPLASTY N/A 5/7/2015    Performed by Dileep Dubois III, MD at [facility] OR 2ND FLR    SIGMOIDOSCOPY, FLEXIBLE N/A 5/21/2019    Performed by ALBERTO Amin MD at [facility] ENDO (2ND FLR)    SIGMOIDOSCOPY, FLEXIBLE N/A 5/13/2019    Performed by ALBERTO Amin MD at [facility] ENDO (2ND FLR)    SINUS SURGERY      F.E.S.S.    SINUS SURGERY FUNCTIONAL ENDOSCOPIC WITH NAVIGATION WITH MAXILLARIES, ETHMOIDS, LEFT SPHENOID, LEFT LOLY N/A 5/7/2015    Performed by Dileep Dubois III, MD at [facility] OR [facility] FLR    STENT, URETERAL placement  4/12/2019    [facility]    TUBAL LIGATION       Review of patient's allergies indicates:  No Known Allergies    Scheduled Medications:    amLODIPine  10 mg Per G Tube Daily    ceFAZolin (ANCEF) IVPB  2 g Intravenous Q8H    chlorhexidine  15 mL Mouth/Throat BID    miconazole nitrate 2%    Topical (Top) BID    pantoprazole  40 mg Per G Tube Daily    rifAMpin (RIFADIN) IVPB  300 mg Intravenous Q12H    scopolamine  1 patch Transdermal Q3 Days       PRN Medications: sodium chloride, acetaminophen, albuterol-ipratropium, dextrose 50%, glucagon (human recombinant), hydrALAZINE, HYDROmorphone, hydrOXYzine, insulin aspart U-100, labetalol, magnesium sulfate IVPB, magnesium sulfate IVPB, ondansetron, oxyCODONE, oxyCODONE, promethazine (PHENERGAN) IVPB, sodium chloride 0.9%    Family History     Problem Relation (Age of Onset)    Diabetes Mother, Father, Sister, Brother, Sister    Heart attack Father    Heart disease Mother    Leukemia Father    No Known Problems Sister, Brother, Brother, Maternal Grandmother, Maternal Grandfather, Paternal Grandmother, Paternal Grandfather, Son, Son, Maternal Aunt, Maternal Uncle, Paternal Aunt, Paternal Uncle        Tobacco Use    Smoking status: Never Smoker    Smokeless tobacco: Never Used   Substance and Sexual Activity    Alcohol use: No     Alcohol/week: 0.0 oz    Drug use: No    Sexual activity: Yes     Partners: Male     Birth control/protection: Post-menopausal     Comment:      Review of Systems   Reason unable to perform ROS: non-verbal 2/2 trach.     Objective:     Vital Signs (Most Recent):  Temp: 98.7 °F (37.1 °C) (05/31/19 0700)  Pulse: 109 (05/31/19 1015)  Resp: (!) 23 (05/31/19 1015)  BP: (!) 161/78 (05/31/19 1000)  SpO2: 98 % (05/31/19 1015)    Vital Signs (24h Range):  Temp:  [98.7 °F (37.1 °C)-99.5 °F (37.5 °C)] 98.7 °F (37.1 °C)  Pulse:  [] 109  Resp:  [16-38] 23  SpO2:  [91 %-100 %] 98 %  BP: (124-195)/(60-93) 161/78     Body mass index is 26.05 kg/m².    Physical Exam   Constitutional: She appears well-developed and well-nourished.   HENT:   Head: Normocephalic and atraumatic.   Eyes: Right eye exhibits no discharge. Left eye exhibits no discharge.   Neck: Neck supple.   Cardiovascular: Intact distal pulses.   Pulmonary/Chest:  Effort normal. No respiratory distress.   On trach collar     Abdominal: Soft. There is no tenderness.   PEG in place  neph tube in place    Musculoskeletal: She exhibits no edema or deformity.   Generalize deconditioning    Neurological: She is alert. No sensory deficit. She exhibits normal muscle tone.   Follows commands      Skin: Skin is warm and dry.   Psychiatric: She has a normal mood and affect. Her behavior is normal.   Vitals reviewed.         Diagnostic Results:   Labs: Reviewed  ECG: Reviewed  X-Ray: Reviewed  CT: Reviewed  MRI: Reviewed

## 2019-05-31 NOTE — CONSULTS
Inpatient consult to Physical Medicine Rehab  Consult performed by: Liliane Hoffman NP  Consult ordered by: Mook Ferguson MD  Reason for consult: assess rehab needs        Reviewed patient history and current admission.  Rehab team following.  Full consult to follow.    SEBASTIAN Hope, FNP-C  Physical Medicine & Rehabilitation   05/31/2019  Spectralink: [id number]

## 2019-06-01 LAB
BASOPHILS # BLD AUTO: 0.07 K/UL (ref 0–0.2)
BASOPHILS NFR BLD: 0.5 % (ref 0–1.9)
DIFFERENTIAL METHOD: ABNORMAL
EOSINOPHIL # BLD AUTO: 0.8 K/UL (ref 0–0.5)
EOSINOPHIL NFR BLD: 5.4 % (ref 0–8)
ERYTHROCYTE [DISTWIDTH] IN BLOOD BY AUTOMATED COUNT: 13.8 % (ref 11.5–14.5)
HCT VFR BLD AUTO: 30.4 % (ref 37–48.5)
HGB BLD-MCNC: 9.5 G/DL (ref 12–16)
IMM GRANULOCYTES # BLD AUTO: 0.06 K/UL (ref 0–0.04)
IMM GRANULOCYTES NFR BLD AUTO: 0.4 % (ref 0–0.5)
LYMPHOCYTES # BLD AUTO: 1.9 K/UL (ref 1–4.8)
LYMPHOCYTES NFR BLD: 12.6 % (ref 18–48)
MCH RBC QN AUTO: 28.8 PG (ref 27–31)
MCHC RBC AUTO-ENTMCNC: 31.3 G/DL (ref 32–36)
MCV RBC AUTO: 92 FL (ref 82–98)
MONOCYTES # BLD AUTO: 0.9 K/UL (ref 0.3–1)
MONOCYTES NFR BLD: 6.2 % (ref 4–15)
NEUTROPHILS # BLD AUTO: 11 K/UL (ref 1.8–7.7)
NEUTROPHILS NFR BLD: 74.9 % (ref 38–73)
NRBC BLD-RTO: 0 /100 WBC
PLATELET # BLD AUTO: 315 K/UL (ref 150–350)
PMV BLD AUTO: 11.1 FL (ref 9.2–12.9)
POCT GLUCOSE: 174 MG/DL (ref 70–110)
POCT GLUCOSE: 175 MG/DL (ref 70–110)
POCT GLUCOSE: 175 MG/DL (ref 70–110)
POCT GLUCOSE: 190 MG/DL (ref 70–110)
POCT GLUCOSE: 208 MG/DL (ref 70–110)
POCT GLUCOSE: 227 MG/DL (ref 70–110)
RBC # BLD AUTO: 3.3 M/UL (ref 4–5.4)
WBC # BLD AUTO: 14.7 K/UL (ref 3.9–12.7)

## 2019-06-01 PROCEDURE — 20600001 HC STEP DOWN PRIVATE ROOM

## 2019-06-01 PROCEDURE — 27000221 HC OXYGEN, UP TO 24 HOURS

## 2019-06-01 PROCEDURE — 25000003 PHARM REV CODE 250: Performed by: STUDENT IN AN ORGANIZED HEALTH CARE EDUCATION/TRAINING PROGRAM

## 2019-06-01 PROCEDURE — 99232 PR SUBSEQUENT HOSPITAL CARE,LEVL II: ICD-10-PCS | Mod: ,,, | Performed by: NURSE PRACTITIONER

## 2019-06-01 PROCEDURE — 87040 BLOOD CULTURE FOR BACTERIA: CPT | Mod: 59

## 2019-06-01 PROCEDURE — 94761 N-INVAS EAR/PLS OXIMETRY MLT: CPT

## 2019-06-01 PROCEDURE — 51702 INSERT TEMP BLADDER CATH: CPT

## 2019-06-01 PROCEDURE — 99232 SBSQ HOSP IP/OBS MODERATE 35: CPT | Mod: ,,, | Performed by: NURSE PRACTITIONER

## 2019-06-01 PROCEDURE — 63600175 PHARM REV CODE 636 W HCPCS: Performed by: STUDENT IN AN ORGANIZED HEALTH CARE EDUCATION/TRAINING PROGRAM

## 2019-06-01 PROCEDURE — 31720 CLEARANCE OF AIRWAYS: CPT

## 2019-06-01 PROCEDURE — 63600175 PHARM REV CODE 636 W HCPCS: Performed by: NURSE PRACTITIONER

## 2019-06-01 PROCEDURE — 85025 COMPLETE CBC W/AUTO DIFF WBC: CPT

## 2019-06-01 PROCEDURE — 99900035 HC TECH TIME PER 15 MIN (STAT)

## 2019-06-01 RX ORDER — INSULIN ASPART 100 [IU]/ML
4 INJECTION, SOLUTION INTRAVENOUS; SUBCUTANEOUS
Status: DISCONTINUED | OUTPATIENT
Start: 2019-06-02 | End: 2019-06-03

## 2019-06-01 RX ORDER — INSULIN ASPART 100 [IU]/ML
4 INJECTION, SOLUTION INTRAVENOUS; SUBCUTANEOUS
Status: DISCONTINUED | OUTPATIENT
Start: 2019-06-01 | End: 2019-06-03

## 2019-06-01 RX ADMIN — RIFAMPIN 300 MG: 600 INJECTION, POWDER, LYOPHILIZED, FOR SOLUTION INTRAVENOUS at 03:06

## 2019-06-01 RX ADMIN — ONDANSETRON 4 MG: 2 INJECTION INTRAMUSCULAR; INTRAVENOUS at 07:06

## 2019-06-01 RX ADMIN — INSULIN ASPART 4 UNITS: 100 INJECTION, SOLUTION INTRAVENOUS; SUBCUTANEOUS at 09:06

## 2019-06-01 RX ADMIN — CEFAZOLIN 2 G: 1 INJECTION, POWDER, FOR SOLUTION INTRAMUSCULAR; INTRAVENOUS at 03:06

## 2019-06-01 RX ADMIN — INSULIN ASPART 2 UNITS: 100 INJECTION, SOLUTION INTRAVENOUS; SUBCUTANEOUS at 01:06

## 2019-06-01 RX ADMIN — AMLODIPINE BESYLATE 10 MG: 10 TABLET ORAL at 09:06

## 2019-06-01 RX ADMIN — PANTOPRAZOLE SODIUM 40 MG: 40 GRANULE, DELAYED RELEASE ORAL at 09:06

## 2019-06-01 RX ADMIN — INSULIN ASPART 2 UNITS: 100 INJECTION, SOLUTION INTRAVENOUS; SUBCUTANEOUS at 12:06

## 2019-06-01 RX ADMIN — MICONAZOLE NITRATE: 20 OINTMENT TOPICAL at 09:06

## 2019-06-01 RX ADMIN — INSULIN ASPART 1 UNITS: 100 INJECTION, SOLUTION INTRAVENOUS; SUBCUTANEOUS at 09:06

## 2019-06-01 RX ADMIN — SCOPALAMINE 1 PATCH: 1 PATCH, EXTENDED RELEASE TRANSDERMAL at 10:06

## 2019-06-01 RX ADMIN — CHLORHEXIDINE GLUCONATE 0.12% ORAL RINSE 15 ML: 1.2 LIQUID ORAL at 09:06

## 2019-06-01 RX ADMIN — INSULIN ASPART 4 UNITS: 100 INJECTION, SOLUTION INTRAVENOUS; SUBCUTANEOUS at 12:06

## 2019-06-01 RX ADMIN — HYDROMORPHONE HYDROCHLORIDE 1 MG: 1 INJECTION, SOLUTION INTRAMUSCULAR; INTRAVENOUS; SUBCUTANEOUS at 09:06

## 2019-06-01 RX ADMIN — INSULIN ASPART 2 UNITS: 100 INJECTION, SOLUTION INTRAVENOUS; SUBCUTANEOUS at 05:06

## 2019-06-01 RX ADMIN — INSULIN ASPART 2 UNITS: 100 INJECTION, SOLUTION INTRAVENOUS; SUBCUTANEOUS at 04:06

## 2019-06-01 RX ADMIN — INSULIN ASPART 4 UNITS: 100 INJECTION, SOLUTION INTRAVENOUS; SUBCUTANEOUS at 04:06

## 2019-06-01 RX ADMIN — CEFAZOLIN 2 G: 1 INJECTION, POWDER, FOR SOLUTION INTRAMUSCULAR; INTRAVENOUS at 09:06

## 2019-06-01 RX ADMIN — CEFAZOLIN 2 G: 1 INJECTION, POWDER, FOR SOLUTION INTRAMUSCULAR; INTRAVENOUS at 06:06

## 2019-06-01 RX ADMIN — INSULIN ASPART 4 UNITS: 100 INJECTION, SOLUTION INTRAVENOUS; SUBCUTANEOUS at 08:06

## 2019-06-01 RX ADMIN — INSULIN ASPART 2 UNITS: 100 INJECTION, SOLUTION INTRAVENOUS; SUBCUTANEOUS at 08:06

## 2019-06-01 NOTE — ASSESSMENT & PLAN NOTE
BG goal 140 - 180    BG is now trending upward above goal while on TF.   Increase Novolog to 4 units every 4 hours while on TF. BG is above goal. Patient has previously proven to tolerate this dose/regimen Please hold if TF is stopped. (weight based dosing as per protocol).  Moderate Dose SQ Insulin Correction Scale PRN for BG excursions. Kidney function WNL  BG monitoring q 4 hrs.     ** Please notify Endocrine for any change and/or advance in diet/TF**  ** Please call Endocrine for any BG related issues **    Discharge Recommendations:     TBD.

## 2019-06-01 NOTE — PROGRESS NOTES
"[facility]  Endocrinology  Progress Note    Admit Date: 4/20/2019     Reason for Consult: Management of T2DM, Hyperglycemia     Surgical Procedure and Date:     04/12/2019: L5-S1 fusion per Dr George in [facility]     04/21/2019:         1. Exploratory laparotomy        2. Ligation of left ureter        3. Removal of left JJ ureteral stent        Diabetes diagnosis year: ANDRE    Home Diabetes Medications:  ANDRE  Toujeo 50 BID  Invokana 300mg daily   Novolog 35 units AM, 45 units PM, 35 units PM    How often checking glucose at home? ANDRE   BG readings on regimen: ANDRE  Hypoglycemia on the regimen?  ANDRE  Missed doses on regimen?  ANDRE    Diabetes Complications include:     Hyperglycemia and Diabetic retinopathy     Complicating diabetes co morbidities:   History of MI and MURTAZA, CAD, HLD    HPI:   Patient is a 59 y.o. female with a diagnosis of HTN, HLN, DM type 2, nonproliferative diabetic renopathy, CAD, NSTEMI, and back pain who presents to the ED with a complaint of altered mental status on 04/20/2019. Is now s/p Exploratory laparotomy, Ligation of left ureter, and Removal of left JJ ureteral stent. Endocrinology consulted to manage DM2/Hyperglycemia.    Lab Results   Component Value Date    HGBA1C 10.8 (H) 02/19/2019       Interval HPI:   Overnight events:  BG continues to trend above goal. NAEON.     Eating:   NPO  Nausea: No  Hypoglycemia and intervention: No  Fever: No  TPN and/or TF: Yes (TF).  If yes, type of TF/TPN and rate: 45 ml/hr    BP (!) 172/84 (BP Location: Left arm, Patient Position: Lying)   Pulse 106   Temp 98.8 °F (37.1 °C) (Oral)   Resp 18   Ht 5' 5" (1.651 m)   Wt 71 kg (156 lb 8.4 oz)   SpO2 98%   Breastfeeding? No   BMI 26.05 kg/m²      Labs Reviewed and Include    No results for input(s): GLU, CALCIUM, ALBUMIN, PROT, NA, K, CO2, CL, BUN, CREATININE, ALKPHOS, ALT, AST, BILITOT in the last 24 hours.  Lab Results   Component Value Date    WBC 12.34 05/31/2019    HGB 8.7 (L) " 05/31/2019    HCT 28.9 (L) 05/31/2019    MCV 95 05/31/2019     05/31/2019     No results for input(s): TSH, FREET4 in the last 168 hours.  Lab Results   Component Value Date    HGBA1C 5.4 05/24/2019       Nutritional status:   Body mass index is 26.05 kg/m².  Lab Results   Component Value Date    ALBUMIN 1.5 (L) 05/28/2019    ALBUMIN 1.5 (L) 05/27/2019    ALBUMIN 1.6 (L) 05/26/2019     No results found for: PREALBUMIN    Estimated Creatinine Clearance: 75.8 mL/min (based on SCr of 0.8 mg/dL).    Accu-Checks  Recent Labs     05/30/19  2116 05/30/19  2354 05/31/19  0439 05/31/19  0802 05/31/19  1203 05/31/19  1637 05/31/19  2124 06/01/19  0125 06/01/19  0547 06/01/19  0852   POCTGLUCOSE 180* 198* 208* 219* 205* 180* 182* 208* 190* 227*       Current Medications and/or Treatments Impacting Glycemic Control  Immunotherapy:    Immunosuppressants     None        Steroids:   Hormones (From admission, onward)    None        Pressors:    Autonomic Drugs (From admission, onward)    None        Hyperglycemia/Diabetes Medications:   Antihyperglycemics (From admission, onward)    Start     Stop Route Frequency Ordered    06/01/19 0800  insulin aspart U-100 pen 2 Units      -- SubQ Every 24 hours (non-standard times) 05/31/19 1124    06/01/19 0400  insulin aspart U-100 pen 2 Units      -- SubQ Every 24 hours (non-standard times) 05/31/19 1124    06/01/19 0000  insulin aspart U-100 pen 2 Units      -- SubQ Every 24 hours (non-standard times) 05/31/19 1124    05/31/19 2000  insulin aspart U-100 pen 2 Units      -- SubQ Every 24 hours (non-standard times) 05/31/19 1124    05/31/19 1600  insulin aspart U-100 pen 2 Units      -- SubQ Every 24 hours (non-standard times) 05/31/19 1124    05/31/19 1223  insulin aspart U-100 pen 1-10 Units      -- SubQ Every 4 hours PRN 05/31/19 1124    05/31/19 1200  insulin aspart U-100 pen 2 Units      -- SubQ Every 24 hours (non-standard times) 05/31/19 1124          ASSESSMENT and PLAN    *  Ureteral transection of left native kidney  Managed per primary team  Avoid hypoglycemia        Type 2 diabetes mellitus with diabetic peripheral angiopathy without gangrene  BG goal 140 - 180    BG is now trending upward above goal while on TF.   Increase Novolog to 4 units every 4 hours while on TF. BG is above goal. Patient has previously proven to tolerate this dose/regimen Please hold if TF is stopped. (weight based dosing as per protocol).  Moderate Dose SQ Insulin Correction Scale PRN for BG excursions. Kidney function WNL  BG monitoring q 4 hrs.     ** Please notify Endocrine for any change and/or advance in diet/TF**  ** Please call Endocrine for any BG related issues **    Discharge Recommendations:     TBD.             CAD (coronary artery disease)  Managed per primary team  Condition may cause insulin resistance       HLD (hyperlipidemia)  Managed per primary team  Condition may cause insulin resistance         Sleep apnea  May affect BG readings if uncontrolled        On enteral nutrition  May cause BG excursions.           Uriah Holder, NP  Endocrinology  [facility]

## 2019-06-01 NOTE — PLAN OF CARE
Problem: Adult Inpatient Plan of Care  Goal: Plan of Care Review  Outcome: Ongoing (interventions implemented as appropriate)  VS stable. Telemetry= ST 100s. Fontenot catheter in place with sanford urine. Left nephrostomy tube with sanford urine. SpO2= 100-99% on 40% trach collar, No SOB noted. Peg tube in place with TF at 45ml/hr, no residuals noted. No c/o pain overnight. WCTM.

## 2019-06-01 NOTE — SUBJECTIVE & OBJECTIVE
Interval History:   Stepped down yesterday. Tolerating trach collar. Adequate UOP.    Review of Systems    Objective:     Temp:  [98.5 °F (36.9 °C)-99.4 °F (37.4 °C)] 98.8 °F (37.1 °C)  Pulse:  [101-121] 106  Resp:  [18-38] 18  SpO2:  [94 %-100 %] 98 %  BP: (151-180)/(72-84) 172/84     Body mass index is 26.05 kg/m².      Bladder Scan Volume (mL): 27 mL (05/10/19 0846)  Post Void Cath Residual Output (mL): 27 mL (05/10/19 0846)    Drains     Drain                 Nephrostomy 04/21/19 0629 Left 8 Fr. 40 days         Gastrostomy/Enterostomy 05/02/19 1134 Percutaneous endoscopic gastrostomy (PEG) LUQ decompression;feeding 28 days         Urethral Catheter 05/29/19 0151 2 days                Physical Exam   Constitutional: She appears well-developed. No distress.   HENT:   Head: Normocephalic and atraumatic.   Neck: No JVD present.   Cardiovascular: Normal rate and regular rhythm.    Pulmonary/Chest: Effort normal. No respiratory distress.   Trach collar   Abdominal: Soft. She exhibits no distension. There is no rebound and no guarding.   Incision c/d/i   G-tube   Genitourinary:   Genitourinary Comments: Fontenot in place draining clear yellow urine  Left neph tube with clear yellow urine   Neurological: She is alert.   Skin: Skin is warm and dry. She is not diaphoretic. No pallor.       Significant Labs:    BMP:  Recent Labs   Lab 05/29/19  0956 05/30/19  0400 05/31/19  0535    145 142   K 4.0 3.6 3.8    109 107   CO2 25 26 27   BUN 28* 26* 21*   CREATININE 1.2 0.9 0.8   CALCIUM 9.0 9.4 9.5       CBC:   Recent Labs   Lab 05/29/19  0321 05/30/19  0400 05/31/19  0535   WBC 11.26 10.42 12.34   HGB 8.2* 8.5* 8.7*   HCT 27.1* 28.3* 28.9*    292 287     Significant Imaging:  All pertinent imaging results/findings from the past 24 hours have been reviewed.

## 2019-06-01 NOTE — ASSESSMENT & PLAN NOTE
Mariann Huff is a 59 y.o. female s/p left ureteral injury on 4/12, presented with fever, AMS, and intraabdominal abscess, taken for washout and ligation of left ureter with neph tube placement on 4/21, Trach/PEG 5/2.  - on trach collar  - continue TF  - ID on board, appreciate recs:   - continue rifampin; completed cefepime x 7 days, now on Ancef; continue rifampin and Ancef until 6/18/19   - 5/13 UCx growing Candida albicans; 7 day course of diflucan 800 mg   - 5/19 BAL growing Pseudomonas   - ID recommends reimaging patient should she continue to show signs of infection  - Maintain neph tube and correa; patient failed VT on 5/28/19  - Urine output adequate  - bloody BMs - flexible sigmoidoscopy on 5/21/19 showed no signs of bleeding, Colonoscopy showed healing rectal ulcer, EGD not indicated per GI; H&H stable  - heparin for RUE DVT held due to GI bleeding, patient found on 5/29 to have RLE DVT; IVC filter placed 5/29    Changed labs to every other day    Dispo: patient stable on trach collar, pending acceptance to rehab facility

## 2019-06-01 NOTE — PLAN OF CARE
Problem: Adult Inpatient Plan of Care  Goal: Plan of Care Review  Outcome: Ongoing (interventions implemented as appropriate)  Plan of care review with patients. BP elevated 170's on 8 at 40% with TC. Refused to get up with PT and refused to work with Speech. Trach care was performed. Fontenot bag was change with aseptic maintained. Fontenot care performed. Nephrostomy tube flushed. Family at beside. Temp is elevated 101 MD notified and blood cultures ordered. Bed in lowest position call light in reach, wheels lock, denies any needs at this time. WCTM

## 2019-06-01 NOTE — SUBJECTIVE & OBJECTIVE
"Interval HPI:   Overnight events:  BG continues to trend above goal. NAEON.     Eating:   NPO  Nausea: No  Hypoglycemia and intervention: No  Fever: No  TPN and/or TF: Yes (TF).  If yes, type of TF/TPN and rate: 45 ml/hr    BP (!) 172/84 (BP Location: Left arm, Patient Position: Lying)   Pulse 106   Temp 98.8 °F (37.1 °C) (Oral)   Resp 18   Ht 5' 5" (1.651 m)   Wt 71 kg (156 lb 8.4 oz)   SpO2 98%   Breastfeeding? No   BMI 26.05 kg/m²     Labs Reviewed and Include    No results for input(s): GLU, CALCIUM, ALBUMIN, PROT, NA, K, CO2, CL, BUN, CREATININE, ALKPHOS, ALT, AST, BILITOT in the last 24 hours.  Lab Results   Component Value Date    WBC 12.34 05/31/2019    HGB 8.7 (L) 05/31/2019    HCT 28.9 (L) 05/31/2019    MCV 95 05/31/2019     05/31/2019     No results for input(s): TSH, FREET4 in the last 168 hours.  Lab Results   Component Value Date    HGBA1C 5.4 05/24/2019       Nutritional status:   Body mass index is 26.05 kg/m².  Lab Results   Component Value Date    ALBUMIN 1.5 (L) 05/28/2019    ALBUMIN 1.5 (L) 05/27/2019    ALBUMIN 1.6 (L) 05/26/2019     No results found for: PREALBUMIN    Estimated Creatinine Clearance: 75.8 mL/min (based on SCr of 0.8 mg/dL).    Accu-Checks  Recent Labs     05/30/19  2116 05/30/19  2354 05/31/19  0439 05/31/19  0802 05/31/19  1203 05/31/19  1637 05/31/19  2124 06/01/19  0125 06/01/19  0547 06/01/19  0852   POCTGLUCOSE 180* 198* 208* 219* 205* 180* 182* 208* 190* 227*       Current Medications and/or Treatments Impacting Glycemic Control  Immunotherapy:    Immunosuppressants     None        Steroids:   Hormones (From admission, onward)    None        Pressors:    Autonomic Drugs (From admission, onward)    None        Hyperglycemia/Diabetes Medications:   Antihyperglycemics (From admission, onward)    Start     Stop Route Frequency Ordered    06/01/19 0800  insulin aspart U-100 pen 2 Units      -- SubQ Every 24 hours (non-standard times) 05/31/19 1124    06/01/19 " 0400  insulin aspart U-100 pen 2 Units      -- SubQ Every 24 hours (non-standard times) 05/31/19 1124    06/01/19 0000  insulin aspart U-100 pen 2 Units      -- SubQ Every 24 hours (non-standard times) 05/31/19 1124    05/31/19 2000  insulin aspart U-100 pen 2 Units      -- SubQ Every 24 hours (non-standard times) 05/31/19 1124    05/31/19 1600  insulin aspart U-100 pen 2 Units      -- SubQ Every 24 hours (non-standard times) 05/31/19 1124    05/31/19 1223  insulin aspart U-100 pen 1-10 Units      -- SubQ Every 4 hours PRN 05/31/19 1124    05/31/19 1200  insulin aspart U-100 pen 2 Units      -- SubQ Every 24 hours (non-standard times) 05/31/19 1124

## 2019-06-01 NOTE — PT/OT/SLP PROGRESS
Speech Language Pathology      Mariann Huff  MRN: [id number]    SLP Evaluation orders received. Upon SLP attempt, Patient politely declined PMSV assessment 2/2 pain. Patient not seen today 2/2 Pt c/o pain.  RN at bedside and aware. ST to re-attempt 6/2/19.      KELSEA Frank., [facility]-SLP  Speech-Language Pathology  Pager: [phone]  6/1/2019

## 2019-06-02 LAB
ANION GAP SERPL CALC-SCNC: 8 MMOL/L (ref 8–16)
BACTERIA #/AREA URNS AUTO: ABNORMAL /HPF
BASOPHILS # BLD AUTO: 0.05 K/UL (ref 0–0.2)
BASOPHILS NFR BLD: 0.4 % (ref 0–1.9)
BILIRUB UR QL STRIP: NEGATIVE
BUN SERPL-MCNC: 25 MG/DL (ref 6–20)
CALCIUM SERPL-MCNC: 9.5 MG/DL (ref 8.7–10.5)
CHLORIDE SERPL-SCNC: 108 MMOL/L (ref 95–110)
CLARITY UR REFRACT.AUTO: CLEAR
CO2 SERPL-SCNC: 30 MMOL/L (ref 23–29)
COLOR UR AUTO: ABNORMAL
CREAT SERPL-MCNC: 0.8 MG/DL (ref 0.5–1.4)
DIFFERENTIAL METHOD: ABNORMAL
EOSINOPHIL # BLD AUTO: 0.9 K/UL (ref 0–0.5)
EOSINOPHIL NFR BLD: 6.6 % (ref 0–8)
ERYTHROCYTE [DISTWIDTH] IN BLOOD BY AUTOMATED COUNT: 13.9 % (ref 11.5–14.5)
EST. GFR  (AFRICAN AMERICAN): >60 ML/MIN/1.73 M^2
EST. GFR  (NON AFRICAN AMERICAN): >60 ML/MIN/1.73 M^2
GLUCOSE SERPL-MCNC: 147 MG/DL (ref 70–110)
GLUCOSE UR QL STRIP: ABNORMAL
HCT VFR BLD AUTO: 26.8 % (ref 37–48.5)
HGB BLD-MCNC: 8.4 G/DL (ref 12–16)
HGB UR QL STRIP: ABNORMAL
HYALINE CASTS UR QL AUTO: 6 /LPF
IMM GRANULOCYTES # BLD AUTO: 0.06 K/UL (ref 0–0.04)
IMM GRANULOCYTES NFR BLD AUTO: 0.5 % (ref 0–0.5)
KETONES UR QL STRIP: NEGATIVE
LEUKOCYTE ESTERASE UR QL STRIP: ABNORMAL
LYMPHOCYTES # BLD AUTO: 1.8 K/UL (ref 1–4.8)
LYMPHOCYTES NFR BLD: 14 % (ref 18–48)
MCH RBC QN AUTO: 28.7 PG (ref 27–31)
MCHC RBC AUTO-ENTMCNC: 31.3 G/DL (ref 32–36)
MCV RBC AUTO: 92 FL (ref 82–98)
MICROSCOPIC COMMENT: ABNORMAL
MONOCYTES # BLD AUTO: 1 K/UL (ref 0.3–1)
MONOCYTES NFR BLD: 7.7 % (ref 4–15)
NEUTROPHILS # BLD AUTO: 9.1 K/UL (ref 1.8–7.7)
NEUTROPHILS NFR BLD: 70.8 % (ref 38–73)
NITRITE UR QL STRIP: NEGATIVE
NRBC BLD-RTO: 0 /100 WBC
PH UR STRIP: 5 [PH] (ref 5–8)
PLATELET # BLD AUTO: 256 K/UL (ref 150–350)
PMV BLD AUTO: 10.5 FL (ref 9.2–12.9)
POCT GLUCOSE: 146 MG/DL (ref 70–110)
POCT GLUCOSE: 154 MG/DL (ref 70–110)
POCT GLUCOSE: 175 MG/DL (ref 70–110)
POCT GLUCOSE: 179 MG/DL (ref 70–110)
POCT GLUCOSE: 180 MG/DL (ref 70–110)
POCT GLUCOSE: 181 MG/DL (ref 70–110)
POTASSIUM SERPL-SCNC: 3.3 MMOL/L (ref 3.5–5.1)
PROT UR QL STRIP: ABNORMAL
RBC # BLD AUTO: 2.93 M/UL (ref 4–5.4)
RBC #/AREA URNS AUTO: 69 /HPF (ref 0–4)
SODIUM SERPL-SCNC: 146 MMOL/L (ref 136–145)
SP GR UR STRIP: 1.02 (ref 1–1.03)
SQUAMOUS #/AREA URNS AUTO: 1 /HPF
URN SPEC COLLECT METH UR: ABNORMAL
WBC # BLD AUTO: 12.91 K/UL (ref 3.9–12.7)
WBC #/AREA URNS AUTO: 10 /HPF (ref 0–5)

## 2019-06-02 PROCEDURE — 25000003 PHARM REV CODE 250: Performed by: STUDENT IN AN ORGANIZED HEALTH CARE EDUCATION/TRAINING PROGRAM

## 2019-06-02 PROCEDURE — 99900035 HC TECH TIME PER 15 MIN (STAT)

## 2019-06-02 PROCEDURE — 85025 COMPLETE CBC W/AUTO DIFF WBC: CPT

## 2019-06-02 PROCEDURE — 63600175 PHARM REV CODE 636 W HCPCS: Performed by: STUDENT IN AN ORGANIZED HEALTH CARE EDUCATION/TRAINING PROGRAM

## 2019-06-02 PROCEDURE — 81001 URINALYSIS AUTO W/SCOPE: CPT

## 2019-06-02 PROCEDURE — 27000221 HC OXYGEN, UP TO 24 HOURS

## 2019-06-02 PROCEDURE — 92597 ORAL SPEECH DEVICE EVAL: CPT

## 2019-06-02 PROCEDURE — 94761 N-INVAS EAR/PLS OXIMETRY MLT: CPT

## 2019-06-02 PROCEDURE — 99233 PR SUBSEQUENT HOSPITAL CARE,LEVL III: ICD-10-PCS | Mod: ,,, | Performed by: INTERNAL MEDICINE

## 2019-06-02 PROCEDURE — 80048 BASIC METABOLIC PNL TOTAL CA: CPT

## 2019-06-02 PROCEDURE — 99900026 HC AIRWAY MAINTENANCE (STAT)

## 2019-06-02 PROCEDURE — 20600001 HC STEP DOWN PRIVATE ROOM

## 2019-06-02 PROCEDURE — 99233 SBSQ HOSP IP/OBS HIGH 50: CPT | Mod: ,,, | Performed by: INTERNAL MEDICINE

## 2019-06-02 RX ORDER — POTASSIUM CHLORIDE 7.45 MG/ML
10 INJECTION INTRAVENOUS
Status: COMPLETED | OUTPATIENT
Start: 2019-06-02 | End: 2019-06-02

## 2019-06-02 RX ADMIN — INSULIN ASPART 1 UNITS: 100 INJECTION, SOLUTION INTRAVENOUS; SUBCUTANEOUS at 01:06

## 2019-06-02 RX ADMIN — INSULIN ASPART 4 UNITS: 100 INJECTION, SOLUTION INTRAVENOUS; SUBCUTANEOUS at 08:06

## 2019-06-02 RX ADMIN — INSULIN ASPART 1 UNITS: 100 INJECTION, SOLUTION INTRAVENOUS; SUBCUTANEOUS at 08:06

## 2019-06-02 RX ADMIN — INSULIN ASPART 2 UNITS: 100 INJECTION, SOLUTION INTRAVENOUS; SUBCUTANEOUS at 08:06

## 2019-06-02 RX ADMIN — INSULIN ASPART 4 UNITS: 100 INJECTION, SOLUTION INTRAVENOUS; SUBCUTANEOUS at 05:06

## 2019-06-02 RX ADMIN — AMLODIPINE BESYLATE 10 MG: 10 TABLET ORAL at 08:06

## 2019-06-02 RX ADMIN — MICONAZOLE NITRATE: 20 OINTMENT TOPICAL at 08:06

## 2019-06-02 RX ADMIN — HYDROMORPHONE HYDROCHLORIDE 1 MG: 1 INJECTION, SOLUTION INTRAMUSCULAR; INTRAVENOUS; SUBCUTANEOUS at 07:06

## 2019-06-02 RX ADMIN — OXYCODONE HYDROCHLORIDE 5 MG: 5 TABLET ORAL at 02:06

## 2019-06-02 RX ADMIN — CHLORHEXIDINE GLUCONATE 0.12% ORAL RINSE 15 ML: 1.2 LIQUID ORAL at 09:06

## 2019-06-02 RX ADMIN — INSULIN ASPART 4 UNITS: 100 INJECTION, SOLUTION INTRAVENOUS; SUBCUTANEOUS at 01:06

## 2019-06-02 RX ADMIN — POTASSIUM CHLORIDE 10 MEQ: 10 INJECTION, SOLUTION INTRAVENOUS at 11:06

## 2019-06-02 RX ADMIN — POTASSIUM CHLORIDE 10 MEQ: 10 INJECTION, SOLUTION INTRAVENOUS at 07:06

## 2019-06-02 RX ADMIN — CEFAZOLIN 2 G: 1 INJECTION, POWDER, FOR SOLUTION INTRAMUSCULAR; INTRAVENOUS at 07:06

## 2019-06-02 RX ADMIN — RIFAMPIN 300 MG: 600 INJECTION, POWDER, LYOPHILIZED, FOR SOLUTION INTRAVENOUS at 02:06

## 2019-06-02 RX ADMIN — CEFAZOLIN 2 G: 1 INJECTION, POWDER, FOR SOLUTION INTRAMUSCULAR; INTRAVENOUS at 02:06

## 2019-06-02 RX ADMIN — INSULIN ASPART 2 UNITS: 100 INJECTION, SOLUTION INTRAVENOUS; SUBCUTANEOUS at 11:06

## 2019-06-02 RX ADMIN — INSULIN ASPART 4 UNITS: 100 INJECTION, SOLUTION INTRAVENOUS; SUBCUTANEOUS at 04:06

## 2019-06-02 RX ADMIN — POTASSIUM CHLORIDE 10 MEQ: 10 INJECTION, SOLUTION INTRAVENOUS at 10:06

## 2019-06-02 RX ADMIN — MICONAZOLE NITRATE: 20 OINTMENT TOPICAL at 09:06

## 2019-06-02 RX ADMIN — POTASSIUM CHLORIDE 10 MEQ: 10 INJECTION, SOLUTION INTRAVENOUS at 09:06

## 2019-06-02 RX ADMIN — PANTOPRAZOLE SODIUM 40 MG: 40 GRANULE, DELAYED RELEASE ORAL at 08:06

## 2019-06-02 RX ADMIN — INSULIN ASPART 4 UNITS: 100 INJECTION, SOLUTION INTRAVENOUS; SUBCUTANEOUS at 11:06

## 2019-06-02 RX ADMIN — CHLORHEXIDINE GLUCONATE 0.12% ORAL RINSE 15 ML: 1.2 LIQUID ORAL at 08:06

## 2019-06-02 RX ADMIN — CEFAZOLIN 2 G: 1 INJECTION, POWDER, FOR SOLUTION INTRAMUSCULAR; INTRAVENOUS at 09:06

## 2019-06-02 NOTE — CARE UPDATE
BG goal 140 - 180     Continue Novolog to 4 units every 4 hours while on TF.   Moderate Dose SQ Insulin Correction Scale PRN for BG excursions. BG monitoring q 4 hrs.      ** Please notify Endocrine for any change and/or advance in diet/TF**  ** Please call Endocrine for any BG related issues **     Discharge Recommendations:     TBD.

## 2019-06-02 NOTE — PLAN OF CARE
Problem: Diabetes Comorbidity  Goal: Blood Glucose Level Within Desired Range  Outcome: Ongoing (interventions implemented as appropriate)  Accuchecks Q4

## 2019-06-02 NOTE — PLAN OF CARE
Problem: Adult Inpatient Plan of Care  Goal: Plan of Care Review  Outcome: Ongoing (interventions implemented as appropriate)  Plan of care review with patients. VSS on 8 at 40% with TC.Worked with PT and Speech therapy.Pt was up in chair for 1hour ambulated back to bed with use of walker. Trach care was performed. Fontenot care performed. Family at besided.PICC lines flushed with 10cc each lumen.PEG tube was flushed new bag and line was hanged. Urine sanford color. Chest Xray done and urinalysis collected. Bed in lowest position call light in reach, wheels lock, denies any needs at this time

## 2019-06-02 NOTE — ASSESSMENT & PLAN NOTE
Mariann Huff is a 59 y.o. female s/p left ureteral injury on 4/12, presented with fever, AMS, and intraabdominal abscess, taken for washout and ligation of left ureter with neph tube placement on 4/21, Trach/PEG 5/2.  - on trach collar, Speech on board  - continue TF  - ID on board, appreciate recs:   - continue rifampin; completed cefepime x 7 days, now on Ancef; continue rifampin and Ancef until 6/18/19   - 5/13 UCx growing Candida albicans; 7 day course of diflucan 800 mg   - 5/19 BAL growing Pseudomonas   - 6/1 repeat blood cultures   - f/u  CXR today   - ID reconsulted  - Maintain neph tube and correa; patient failed VT on 5/28/19  - Urine output adequate  - bloody BMs - flexible sigmoidoscopy on 5/21/19 showed no signs of bleeding, Colonoscopy showed healing rectal ulcer, EGD not indicated per GI; H&H stable  - heparin for RUE DVT held due to GI bleeding, patient found on 5/29 to have RLE DVT; IVC filter placed 5/29    Changed labs to every other day    Dispo: patient stable on trach collar, pending acceptance to rehab facility and ID recs

## 2019-06-02 NOTE — CONSULTS
[facility]  Infectious Disease  Consult Note    Patient Name: Mariann Huff  MRN: [id number]  Admission Date: 4/20/2019  Hospital Length of Stay: 43 days  Attending Physician: Robin Boyd MD  Primary Care Provider: Jasbir Haney MD     Isolation Status: No active isolations        Inpatient consult to Infectious Diseases  Consult performed by: SEBASTIAN Mcdaniel, ANP  Consult ordered by: Hugh Higuera MD  Reason for consult: new onset fevers, long hospital course, previously seen        ID Consult acknowledged.   Full consult and recommendations to follow today.  In the interim, please call for any immediate concerns.     Thank you.   SEBASTIAN Oakes, ANP-C  [phone] pager,  [phone]

## 2019-06-02 NOTE — PLAN OF CARE
Problem: SLP Goal  Goal: SLP Goal  Speech Language Pathology Goals  Goals expected to be met by 6/8    1. Pt will tolerate PMSV for 5 min with >92% spO2 to increase phonation, respiration and swallowing aspects  2. Pt/family will don/doff PMSV x1 during session with min cues  3. Pt will vocalize with  PMSV in place to increase verbal expression.        PMSV eval completed. Pt would benefit from trach downsizing to increase tolerance of PMSV.   Jerilyn Smith, HODAN-SLP  6/2/2019

## 2019-06-02 NOTE — PLAN OF CARE
Problem: Adult Inpatient Plan of Care  Goal: Plan of Care Review  Outcome: Ongoing (interventions implemented as appropriate)  VS stable, Glucose stable. Telemetry= ST 100s. Fontenot catheter in place and left nephrostomy tube in place with sanford urine. TF at 45ml/hr. No complaints overnight. Patient had a loose, soft BM. WCTM.

## 2019-06-02 NOTE — PT/OT/SLP EVAL
Speech Language Pathology Evaluation  One Way Speaking Valve Evaluation    Patient Name:  Mariann Huff   MRN:  [id number]  Admitting Diagnosis: Ureteral transection of left native kidney    IMPORTANT  CAUTION: CUFF MUST ALWAYS BE DEFLATED WHEN VALVE IN USE    Recommendations:                 General Recommendations:  One Way Speaking Valve  General Precautions: Standard, fall  Communication strategies:  written language, mouthing words, gestures    History:     Past Medical History:   Diagnosis Date    Anticoagulant long-term use     Asthma     Back pain     Bradycardia, unspecified 2019    The etiology of the bradycardic episode is unclear.  The have appear to be respiratory in origin (at least the 1st episode).  We will continue to monitor carefully.  We are awaiting evaluation by Cardiology.      CAD (coronary artery disease)     s/p stentimg  (2), (1)    Carotid artery stenosis     Diabetes mellitus type 2 in obese     HTN (hypertension), benign     Hyperlipidemia     Keloid cicatrix     NPDR (nonproliferative diabetic retinopathy) 2015    NSTEMI (non-ST elevated myocardial infarction)     Nuclear sclerosis - Right Eye 3/18/2014    Primary localized osteoarthrosis, lower leg 2014    Sleep apnea        Past Surgical History:   Procedure Laterality Date    BRONCHOSCOPY N/A 2019    Performed by Sean Ruano MD at [facility] OR 2ND FLR    CARDIAC CATHETERIZATION      cataract extraction left eye      cataracts      CATHETERIZATION, HEART, LEFT Left 2014    Performed by Wilman Kim MD at [facility] CATH LAB     SECTION, LOW TRANSVERSE      COLONOSCOPY N/A 2019    Performed by Al Alaniz MD at [facility] ENDO (2ND FLR)    CORONARY ANGIOPLASTY      Creation, Nephrostomy, Percutaneous Left 2019    Performed by Karina Surgeon at [facility] KARINA    CREATION, TRACHEOSTOMY N/A 2019    Performed by Sean Ruano MD at [facility] OR 2ND FLR    EGD, WITH  PEG TUBE INSERTION  5/2/2019    Performed by Sean [facility] at [facility] OR 2ND FLR    ESOPHAGOGASTRODUODENOSCOPY (EGD) N/A 8/12/2016    Performed by Gardenia Adamson MD at [facility] ENDO (4TH FLR)    EXCISION TURBINATE, SUBMUCOUS      FUSION, SPINE, LUMBAR, ANTERIOR APPROACH Left L5-S1 Anterior to Psoa Interbody Fusion, L5-S1 Posterior Instrumentation Left 4/12/2019    Performed by Mk George MD at [facility] OR 2ND FLR    HAND SURGERY Left     HAND SURGERY Right     torn ligament repair/ Dr. Yeboah    HYSTERECTOMY      Injection,steroid,epidural,transforaminal approach - Bilateral - S1 Bilateral 9/25/2018    Performed by Tl Abreu MD at [facility] PAIN MGT    Injection-steroid-epidural-caudal N/A 2/7/2019    Performed by Dave Bentley Jr., MD at [facility] PAIN MGT    INSERTION-INTRAOCULAR LENS (IOL) Right 9/1/2015    Performed by Good Domingo MD at [facility] OR 1ST FLR    LAPAROTOMY, EXPLORATORY; LIGATION OF LEFT URETER; DOUBLE J STENT REMOVAL LEFT URETER Left 4/20/2019    Performed by Paul Carlson MD at [facility] OR 2ND FLR    left foot surgery      left wrist surgery      NASAL SEPTUM SURGERY  5/7/15    PHACOEMULSIFICATION-ASPIRATION-CATARACT Right 9/1/2015    Performed by Good Domingo MD at [facility] OR 1ST FLR    REPAIR, URETER  4/12/2019    Performed by Mk George MD at [facility] OR 2ND FLR    RESECTION-TURBINATES (SMR) N/A 5/7/2015    Performed by Dileep Dubois III, MD at [facility] OR 2ND FLR    rt elbow surgery      S/P LAD COATED STENT  05/14/2010    6 total     S/P OM1 STENT  08/2003    SEPTOPLASTY N/A 5/7/2015    Performed by Dileep Dubois III, MD at [facility] OR 2ND FLR    SIGMOIDOSCOPY, FLEXIBLE N/A 5/21/2019    Performed by ALBERTO Amin MD at [facility] ENDO (2ND FLR)    SIGMOIDOSCOPY, FLEXIBLE N/A 5/13/2019    Performed by ALBERTO Amin MD at [facility] ENDO (2ND FLR)    SINUS SURGERY      F.E.S.S.    SINUS SURGERY FUNCTIONAL ENDOSCOPIC WITH NAVIGATION WITH MAXILLARIES,  ETHMOIDS, LEFT SPHENOID, LEFT OLLY N/A 5/7/2015    Performed by Dileep Dubois III, MD at [facility] OR 2ND FLR    STENT, URETERAL placement  4/12/2019    Performed by Robin Boyd MD at [facility] OR 2ND FLR    TUBAL LIGATION         Subjective     Awake/alert    Objective:     Level of Alertness:  · Alert  · Cooperative  Pain/Comfort:  · Pain Rating 1: 0/10  · Pain Rating Post-Intervention 1: 0/10      Oral Musculature Evaluation  · Oral Musculature: WFL  · Dentition: present and adequate  · Oral Labial Strength and Mobility: WFL  · Lingual Strength and Mobility: WFL    Trach/Vent:  · Tracheostomy Type  · Shiley  · Tracheostomy Size: 8.0  · Tolerates cuff deflated: Yes        One Way Speaking Valve Placement:  Trach size/type: Shiley 8 cuff deflated  Able to phonate around trach:No  Vocal quality with digital finger occlusion:No audible air appreciated  O2 sats at rest: 100%  Back pressure upon removal: pt unable to tolerate valve. PMSV spontaneously doff'd upon exhale 2/2 back pressure  Education topics addressed: cleaning valve, one-way technology, anatomy of trach, precautions with cuffed trach, removal of valve prior to sleeping. SLP spoke with pt and MD on need for trach downsizing to increase valve tolerance. MD stated he would take with general surgery about trach downsizing.        Assessment:     Mariann Huff is a 59 y.o. female with an SLP diagnosis of One Way Speaking Valve Training.      Goals:   Multidisciplinary Problems     SLP Goals        Problem: SLP Goal    Goal Priority Disciplines Outcome   SLP Goal     SLP    Description:  Speech Language Pathology Goals  Goals expected to be met by 6/8    1. Pt will tolerate PMSV for 5 min with >92% spO2 to increase phonation, respiration and swallowing aspects  2. Pt/family will don/doff PMSV x1 during session with min cues  3. Pt will vocalize with  PMSV in place to increase verbal expression.                          Plan:     · Patient to be seen:  3  x/week   · Plan of Care reviewed with:  patient, family   · SLP Follow-Up:  Yes       Discharge recommendations:  (TBD)       Time Tracking:     SLP Treatment Date:   06/02/19  Speech Start Time:  0807  Speech Stop Time:  0815     Speech Total Time (min):  8 min    Billable Minutes: Evaluation Use/Fit Voiced Prosthetic 8    Jerilyn Smith CCC-SLP  06/02/2019

## 2019-06-02 NOTE — PROGRESS NOTES
[facility]  Urology  Progress Note    Patient Name: Mariann Huff  MRN: [id number]  Admission Date: 4/20/2019  Hospital Length of Stay: 43 days  Code Status: Full Code   Attending Provider: Robin Boyd MD   Primary Care Physician: Jasbir Haney MD    Subjective:     HPI:  Mariann Huff is a 59 y.o. female with history of HTN, type 2 diabetes mellitus, CAD, NSTEMI, and back pain who presents to [facility] with altered mental status and sepsis. She underwent L5-S1 OLIF with NSGY on 4/12 and had intraoperative left ureteral injury with ureteroureteral anastomosis and ureteral stent placement. She did well initially and had correa and MADHURI drain removed on 4/16. She began having chills and altered mental status 2 days ago and this has progressively worsened. No complaints of pain.     She is altered and HPI is limited. In the ED she is febrile to 103 and tachycardic and pressures are low normal. WBC is 5, creatinine is 3.6 baseline 1.0, lactate is 4.6. Cath UA concerning for infection, 3+ LE, >100 WBCs, and many bacteria on microscopy.    CT and MRI abdomen both show air with minimal fluid near the surgical site with left ureter coursing through. There is air throughout the proximal collecting system which is decompressed with JJ ureteral stent in good position.    Taken for ex lap, ligation of left ureter and left neph tube placement on 4/21/19.    Interval History:   Febrile to 101.1. Intermittent tachycardia. BPs stable. Tolerating trach collar. Adequate UOP. WBC 12.9 from 14.7 yesterday. ID consulted.    Review of Systems    Objective:     Temp:  [98.9 °F (37.2 °C)-101.1 °F (38.4 °C)] 99.2 °F (37.3 °C)  Pulse:  [] 112  Resp:  [16-32] 16  SpO2:  [90 %-99 %] 95 %  BP: (137-176)/(70-84) 165/84     Body mass index is 26.05 kg/m².      Bladder Scan Volume (mL): 27 mL (05/10/19 0846)  Post Void Cath Residual Output (mL): 27 mL (05/10/19 0846)    Drains     Drain                 Nephrostomy 04/21/19 0641  Left 8 Fr. 40 days         Gastrostomy/Enterostomy 05/02/19 1134 Percutaneous endoscopic gastrostomy (PEG) LUQ decompression;feeding 28 days         Urethral Catheter 05/29/19 0151 2 days                Physical Exam   Constitutional: She appears well-developed. No distress.   HENT:   Head: Normocephalic and atraumatic.   Neck: No JVD present.   Cardiovascular: Normal rate and regular rhythm.    Pulmonary/Chest: Effort normal. No respiratory distress.   Trach collar   Abdominal: Soft. She exhibits no distension. There is no rebound and no guarding.   Incision c/d/i   G-tube   Genitourinary:   Genitourinary Comments: Fontenot in place draining clear yellow urine  Left neph tube with clear yellow urine   Neurological: She is alert.   Skin: Skin is warm and dry. She is not diaphoretic. No pallor.       Significant Labs:    BMP:  Recent Labs   Lab 05/30/19  0400 05/31/19  0535 06/02/19  0427    142 146*   K 3.6 3.8 3.3*    107 108   CO2 26 27 30*   BUN 26* 21* 25*   CREATININE 0.9 0.8 0.8   CALCIUM 9.4 9.5 9.5       CBC:   Recent Labs   Lab 05/31/19  0535 06/01/19  1712 06/02/19  0427   WBC 12.34 14.70* 12.91*   HGB 8.7* 9.5* 8.4*   HCT 28.9* 30.4* 26.8*    315 256     Significant Imaging:  All pertinent imaging results/findings from the past 24 hours have been reviewed.                  Assessment/Plan:     * Ureteral transection of left native kidney  Mariann TRISTIN Huff is a 59 y.o. female s/p left ureteral injury on 4/12, presented with fever, AMS, and intraabdominal abscess, taken for washout and ligation of left ureter with neph tube placement on 4/21, Trach/PEG 5/2.  - on trach collar, Speech on board  - continue TF  - ID on board, appreciate recs:   - continue rifampin; completed cefepime x 7 days, now on Ancef; continue rifampin and Ancef until 6/18/19   - 5/13 UCx growing Candida albicans; 7 day course of diflucan 800 mg   - 5/19 BAL growing Pseudomonas   - 6/1 repeat blood cultures   - f/u  CXR  today   - ID reconsulted  - Maintain neph tube and correa; patient failed VT on 5/28/19  - Urine output adequate  - bloody BMs - flexible sigmoidoscopy on 5/21/19 showed no signs of bleeding, Colonoscopy showed healing rectal ulcer, EGD not indicated per GI; H&H stable  - heparin for RUE DVT held due to GI bleeding, patient found on 5/29 to have RLE DVT; IVC filter placed 5/29    Changed labs to every other day    Dispo: patient stable on trach collar, pending acceptance to rehab facility and ID recs    Sepsis  As above        VTE Risk Mitigation (From admission, onward)        Ordered     IP VTE LOW RISK PATIENT  Once      05/08/19 1315     Place sequential compression device  Until discontinued      04/20/19 9758          Hugh Higuera MD  Urology  [facility]

## 2019-06-02 NOTE — CONSULTS
[facility]  Infectious Disease  Consult Note    Patient Name: Mariann Huff  MRN: [id number]  Admission Date: 4/20/2019  Hospital Length of Stay: 43 days  Attending Physician: Robin Boyd MD  Primary Care Provider: Jasbir Haney MD     Isolation Status: No active isolations    Patient information was obtained from patient, relative(s) and past medical records.      Consults  Assessment/Plan:     Sepsis     58 y/o female well known to ID with HTN, DMII, CAD, NSTEMI, s/p L5-S1 OLIF on 4/12 complicated by intraoperative left ureteral injury s/p ureteroureteral anastomoses and ureteral stent placement who initially presented on 4/20 with fevers, AMS and intraabdominal abscess, s/p washout and ligation of left ureter with nephrostomy tube placement on 4/21.  She  has had a long and complicated hospital course (See HPI for chronology).  She is currently on long term antibiotic therapy for E coli and Staph lugdenesis infection with plan for subsequent suppressive therapy given concern for hardware involvement (end date 6/18/19).  Most recently followed by ID on 5/23, completed 7 day course of cefepime for HAP (pseudomonas) on 5/25 and 7 day course of high dose fluconazole for candida glabrata UTI.   ID is now reconsulted for recurrent fever.      Blood cultures 6/1 - NGTD   CXR 6/2 - no changes noted.  Prominent interstitial markings, no consolidations.      Patient denies subjective fevers, chills, sweats.  Denies HA.  + cough, no SOB.  + nausea, no abdominal pain.  No diarrhea.  Abdominal exam benign.  No significant skin breakdown. No calf pain.  PICC (placed 5/24), PEG and trach sites clear.  Correa placed 5/29.  Discussed need for correa with Urology, but apparently needed for persistent urinary retention when foleys removed.      No obvious source of fever.  Will check U/A reflex to culture.  Continue same antibiotics for now and follow.  See    Plan  - Continue current IV cefazolin and  Rifampin.   Estimated end date 6/18/19.    - U/A reflex to culture ordered.   - Follow blood cultures.  Repeat if fever recurs.   - Will follow up tomorrow.     Patient seen by, and plan discussed with, ID staff  Discussed with Primary Team                 Thank you.   Please call for any questions or concerns.  SEBASTIAN Oakes, ANP-C  [phone]  Subjective:     Principal Problem: Ureteral transection of left native kidney    HPI:  Ms. Huff is a 58 y/o female with HTN, DMII, CAD, NSTEMI, s/p L5-S1 OLIF with NSGY 4/12, complicated by a left ureter transection, repaired with ureteroureteral anastomoses and ureteral stent placement. She was discharged with a correa and MADHURI drain that was removed on 4/16. She was seen by urology and anticipated stent removal in 2-3 months (6/2019).  She presented to ED on 4/20 with AMS and sepsis. She was febrile 103 in ED. WBC 5K on admit. Lactate 4.6. Cath UA with 3+leukocytes, >100 WBcs and many bacteria.     MRI showed postsurgical change of L5-S1 posterior spinal fusion and anterior interbody fusion and a 4.4 cm fluid fluid collection in the left anterior prevertebral soft tissues at the level of the L5-S1 disc space - urinoma was not excluded.  A CT showed air collection within the anterior paraspinous soft tissues through which the left ureter courses. Given that the ureter courses through this, communication of the ureter with this collection is not excluded.  Air also noted in the left renal collecting system, along the left ureter and within the urinary bladder.   This was not amendable for drainage by IR so she was taken to OR for washout and she underwent ex-lap of left ureter and left nephrostomy tube placement 4/21/19.  Per operative note, there were pockets of purulence noted and evacuated, sent for culture of left retroperitoneum to pole of left kidney. The stent was visible. Posterior to the stent there was a pocket of purulent fluid and exposed hardware along the spinal vertebrae.  The tissue along the distal and proximal end of ureter were friable and unhealthy. She underwent left nephrostomy tube placement. The stent was removed and several metal clips were placed on proximal end of the ureteral. MADHURI drain was placed into left retroperitoneal.     Blood cultures were positive for E coli.  Urine cultures +E.coli and OR cultures were + for Staph lugdenensis (pan sensitive).   ID was consulted at that time (see note of 4/22).  There was concern for hardware involvement and she was ultimately treated with ceftriaxone and rifampin with plan for 6 weeks of IV antibiotics (end date 6/4/19) with plan for oral suppressive therapy thereafter.        ID was reconsulted on 4/29/16 for fevers and leukocytosis.   Patient was broadened to Vanc, CTX and Rifampin, and later vanc was stopped.  Source of fevers and leukocytosis were unclear, though abdominal source was suspected.  Blood cultures were negative, CT abd showed a superficial fluid collection, C diff was negative, lines were changed, BAL was negative. She was noted to have a partially occluded thrombus in the RIJ and proximal subclavian.  Drug reaction was considered.  Patient improved and leukocytosis and fever resolved.    ID signed off with plans to complete Cefazolin and rifampin for 6 weeks through 6/4.    ID was again re-consulted on 5/20 for recurrent fever and leukocytosis.  Abdominal drain noted to be draining pus, she had some rectal bleeding, and CXR showed new Left basilar consolidation concerning for HCAP.  Endotracheal aspirate showed Pseudomonas. Blood cultures negative. Urine showed candida glabrata.  Antibiotics were broadened to Vancomycin, cefepime, rifampin and fluconazole and lines were exchanged.  A repeat CT abd showed persistent subcutaneous fluid collection noted to be larger, but decreased fat stranding.  She was treated with 7 day course of Cefepime for HAP, a 7 day course of high dose fluconazole for candida glabrata and  then transitioned back to cefazolin plus rifampin for prior E Coli and Staph lugdenensis and duration was extended to 8 week with end date of .     ID is now reconsulted for recurrent fever to 101.1.  WBC 12.91        Past Medical History:   Diagnosis Date    Anticoagulant long-term use     Asthma     Back pain     Bradycardia, unspecified 2019    The etiology of the bradycardic episode is unclear.  The have appear to be respiratory in origin (at least the 1st episode).  We will continue to monitor carefully.  We are awaiting evaluation by Cardiology.      CAD (coronary artery disease)     s/p stentimg  (2), (1)    Carotid artery stenosis     Diabetes mellitus type 2 in obese     HTN (hypertension), benign     Hyperlipidemia     Keloid cicatrix     NPDR (nonproliferative diabetic retinopathy) 2015    NSTEMI (non-ST elevated myocardial infarction)     Nuclear sclerosis - Right Eye 3/18/2014    Primary localized osteoarthrosis, lower leg 2014    Sleep apnea        Past Surgical History:   Procedure Laterality Date    BRONCHOSCOPY N/A 2019    Performed by Sean Ruano MD at [facility] OR 2ND FLR    CARDIAC CATHETERIZATION      cataract extraction left eye      cataracts      CATHETERIZATION, HEART, LEFT Left 2014    Performed by Wilman Kim MD at [facility] CATH LAB     SECTION, LOW TRANSVERSE      COLONOSCOPY N/A 2019    Performed by Al Alaniz MD at [facility] ENDO (2ND FLR)    CORONARY ANGIOPLASTY      Creation, Nephrostomy, Percutaneous Left 2019    Performed by Karina Surgeon at [facility] KARINA    CREATION, TRACHEOSTOMY N/A 2019    Performed by Sean Ruano MD at [facility] OR 2ND FLR    EGD, WITH PEG TUBE INSERTION  2019    Performed by Sean Ruano MD at [facility] OR 2ND FLR    ESOPHAGOGASTRODUODENOSCOPY (EGD) N/A 2016    Performed by Gardenia Adamson MD at [facility] ENDO (4TH FLR)    EXCISION TURBINATE, SUBMUCOUS      FUSION,  SPINE, LUMBAR, ANTERIOR APPROACH Left L5-S1 Anterior to Psoa Interbody Fusion, L5-S1 Posterior Instrumentation Left 4/12/2019    Performed by Mk George MD at [facility] OR 2ND FLR    HAND SURGERY Left     HAND SURGERY Right     torn ligament repair/ Dr. Yeboah    HYSTERECTOMY      Injection,steroid,epidural,transforaminal approach - Bilateral - S1 Bilateral 9/25/2018    Performed by Tl Abreu MD at [facility] MGT    Injection-steroid-epidural-caudal N/A 2/7/2019    Performed by Dave Bentley Jr., MD at [facility] PAIN MGT    INSERTION-INTRAOCULAR LENS (IOL) Right 9/1/2015    Performed by Good Domingo MD at [facility] OR 1ST FLR    LAPAROTOMY, EXPLORATORY; LIGATION OF LEFT URETER; DOUBLE J STENT REMOVAL LEFT URETER Left 4/20/2019    Performed by Paul Carlson MD at [facility] OR 2ND FLR    left foot surgery      left wrist surgery      NASAL SEPTUM SURGERY  5/7/15    PHACOEMULSIFICATION-ASPIRATION-CATARACT Right 9/1/2015    Performed by Good Domingo MD at [facility] OR 1ST FLR    REPAIR, URETER  4/12/2019    Performed by Mk George MD at [facility] OR 2ND FLR    RESECTION-TURBINATES (SMR) N/A 5/7/2015    Performed by Dileep Dubois III, MD at [facility] OR 2ND FLR    rt elbow surgery      S/P LAD COATED STENT  05/14/2010    6 total     S/P OM1 STENT  08/2003    SEPTOPLASTY N/A 5/7/2015    Performed by Dileep Dubois III, MD at [facility] OR 2ND FLR    SIGMOIDOSCOPY, FLEXIBLE N/A 5/21/2019    Performed by ALBERTO Amin MD at [facility] ENDO (2ND FLR)    SIGMOIDOSCOPY, FLEXIBLE N/A 5/13/2019    Performed by ALBERTO Amin MD at [facility] ENDO (2ND FLR)    SINUS SURGERY      F.E.S.S.    SINUS SURGERY FUNCTIONAL ENDOSCOPIC WITH NAVIGATION WITH MAXILLARIES, ETHMOIDS, LEFT SPHENOID, LEFT LOLY N/A 5/7/2015    Performed by Dileep Dubois III, MD at [facility] OR 2ND FLR    STENT, URETERAL placement  4/12/2019    Performed by Robin Boyd MD at [facility] OR 2ND FLR    TUBAL LIGATION         Review of  "patient's allergies indicates:  No Known Allergies    Medications:  Medications Prior to Admission   Medication Sig    albuterol (PROVENTIL/VENTOLIN HFA) 90 mcg/actuation inhaler Inhale 2 puffs into the lungs every 6 (six) hours as needed for Wheezing.    amLODIPine (NORVASC) 10 MG tablet TAKE 1 TABLET (10 MG TOTAL) BY MOUTH ONCE DAILY.    aspirin 81 mg Tab Take 81 mg by mouth. 1 Tablet Oral Every day    atorvastatin (LIPITOR) 40 MG tablet TAKE 1 TABLET (40 MG TOTAL) BY MOUTH ONCE DAILY.    ACCU-CHEK EDIN PLUS METER [facility]     BD INSULIN PEN NEEDLE UF MINI 31 x 3/16 " Ndle USE 4 TIMES A DAY    blood sugar diagnostic (ACCU-CHEK EDIN PLUS TEST STRP) Strp TEST BLOOD SUGARS 4 TIMES DAILY    chlorthalidone (HYGROTEN) 50 MG Tab Take 1 tablet (50 mg total) by mouth once daily.    esomeprazole (NEXIUM) 40 MG capsule TAKE 1 CAPSULE (40 MG TOTAL) BY MOUTH BEFORE BREAKFAST.    fenofibrate micronized (LOFIBRA) 134 MG Cap TAKE 1 CAPSULE (134 MG TOTAL) BY MOUTH BEFORE BREAKFAST.    flash glucose scanning reader (FREESTYLE MISTI 14 DAY READER) Misc 1 each by Misc.(Non-Drug; Combo Route) route once daily.    flash glucose sensor (FREESTYLE MISTI 14 DAY SENSOR) Kit 1 each by Misc.(Non-Drug; Combo Route) route once daily.    gabapentin (NEURONTIN) 100 MG capsule Take 2 capsules (200 mg total) by mouth every evening.    insulin aspart U-100 (NOVOLOG FLEXPEN U-100 INSULIN) 100 unit/mL InPn pen INJECT 35 U AM, 45 U AT NOON, 35 U PM.150-200 +2, 201-250 +4, 251-300 +6, 301-350 +8, >350 +10    insulin glargine, TOUJEO, (TOUJEO SOLOSTAR U-300 INSULIN) 300 unit/mL (1.5 mL) InPn pen Inject 50 Units into the skin 2 (two) times daily.    irbesartan (AVAPRO) 300 MG tablet Take 1 tablet (300 mg total) by mouth every evening.    lancets 30 gauge Misc     metoprolol succinate (TOPROL-XL) 100 MG 24 hr tablet TAKE 1 TABLET (100 MG TOTAL) BY MOUTH ONCE DAILY.    nitroGLYCERIN (NITROSTAT) 0.4 MG SL tablet Take one every 2-3 min till " "chestpain relief for 3 times and if still no relief, call MD or come to ED    omega-3 acid ethyl esters (LOVAZA) 1 gram capsule Take 1 g by mouth once daily.     pen needle, diabetic (BD ULTRA-FINE GRABIEL PEN NEEDLES) 32 gauge x 5/32" Ndle For use with insulin pens daily 4 times a day    prasugrel (EFFIENT) 10 mg Tab TAKE 1 TABLET (10 MG TOTAL) BY MOUTH ONCE DAILY.    tamsulosin (FLOMAX) 0.4 mg Cap Take 1 capsule (0.4 mg total) by mouth every evening.    tramadol (ULTRAM) 50 mg tablet Take 1 tablet (50 mg total) by mouth 3 (three) times daily as needed for Pain.    TRULICITY 1.5 mg/0.5 mL PnIj INJECT 1.5 MG INTO THE SKIN EVERY 7 DAYS.    zolpidem (AMBIEN) 5 MG Tab Take 1 tablet (5 mg total) by mouth nightly as needed.    [DISCONTINUED] cyclobenzaprine (FLEXERIL) 10 MG tablet TAKE 1 TABLET BY MOUTH 3 TIMES A DAY AS NEEDED FOR MUSCLE SPASMS. NO WORKING OR DRIVING ON THIS MED    [DISCONTINUED] INVOKANA 300 mg Tab tablet Take 1 tablet (300 mg total) by mouth once daily.     Antibiotics (From admission, onward)    Start     Stop Route Frequency Ordered    05/26/19 0600  ceFAZolin injection 2 g      -- IV Every 8 hours (non-standard times) 05/23/19 0825    05/07/19 1500  rifAMpin (RIFADIN) 300 mg in sodium chloride 0.9% 100 mL IVPB      -- IV Every 12 hours (non-standard times) 05/07/19 1249        Antifungals (From admission, onward)    Start     Stop Route Frequency Ordered    05/23/19 0900  fluconazole tablet 800 mg      05/30 0859 PER G TUBE Daily 05/22/19 1636    05/09/19 0930  miconazole nitrate 2% ointment      -- Top 2 times daily 05/09/19 0822        Antivirals (From admission, onward)    None           Immunization History   Administered Date(s) Administered    Influenza 11/19/2009    Influenza - Quadrivalent 10/10/2014, 10/09/2015, 10/24/2016    Influenza - Quadrivalent - PF 11/27/2017    Influenza A (H1N1) 2009 Monovalent - IM 11/19/2009    Pneumococcal Polysaccharide - 23 Valent 06/19/2014    Td " - PF (ADULT) 04/17/2017       Family History     Problem Relation (Age of Onset)    Diabetes Mother, Father, Sister, Brother, Sister    Heart attack Father    Heart disease Mother    Leukemia Father    No Known Problems Sister, Brother, Brother, Maternal Grandmother, Maternal Grandfather, Paternal Grandmother, Paternal Grandfather, Son, Son, Maternal Aunt, Maternal Uncle, Paternal Aunt, Paternal Uncle        Social History     Socioeconomic History    Marital status:      Spouse name: Shamir    Number of children: 2    Years of education: Not on file    Highest education level: Not on file   Occupational History    Occupation: [employer]     Employer: [employer]     Employer: [employer]     Employer: [employer]   Social Needs    Financial resource strain: Not on file    Food insecurity:     Worry: Not on file     Inability: Not on file    Transportation needs:     Medical: Not on file     Non-medical: Not on file   Tobacco Use    Smoking status: Never Smoker    Smokeless tobacco: Never Used   Substance and Sexual Activity    Alcohol use: No     Alcohol/week: 0.0 oz    Drug use: No    Sexual activity: Yes     Partners: Male     Birth control/protection: Post-menopausal     Comment:    Lifestyle    Physical activity:     Days per week: Not on file     Minutes per session: Not on file    Stress: Not on file   Relationships    Social connections:     Talks on phone: Not on file     Gets together: Not on file     Attends Latter-day service: Not on file     Active member of club or organization: Not on file     Attends meetings of clubs or organizations: Not on file     Relationship status: Not on file   Other Topics Concern    Are you pregnant or think you may be? No    Breast-feeding No   Social History Narrative    . 2 children.      Review of Systems   Constitutional: Negative for activity change, chills, diaphoresis and fever.   HENT:  Negative for congestion and sore throat.    Eyes: Negative.    Respiratory: Positive for cough. Negative for shortness of breath and wheezing.         Trach   Cardiovascular: Negative for chest pain, palpitations and leg swelling.   Gastrointestinal: Positive for nausea. Negative for abdominal pain, diarrhea and vomiting.        Tube feeding     Genitourinary:        Fontenot  Nephrostomy tube     Musculoskeletal: Negative for arthralgias, back pain and myalgias.   Skin: Negative for rash and wound.   Neurological: Negative for dizziness and headaches.   Psychiatric/Behavioral: Negative for agitation and behavioral problems. The patient is not nervous/anxious.      Objective:     Vital Signs (Most Recent):  Temp: 99.2 °F (37.3 °C) (06/02/19 1615)  Pulse: 106 (06/02/19 1615)  Resp: 18 (06/02/19 1615)  BP: (!) 160/77 (06/02/19 1615)  SpO2: 96 % (06/02/19 1615) Vital Signs (24h Range):  Temp:  [98.9 °F (37.2 °C)-99.6 °F (37.6 °C)] 99.2 °F (37.3 °C)  Pulse:  [] 106  Resp:  [16-32] 18  SpO2:  [95 %-99 %] 96 %  BP: (137-171)/(70-86) 160/77     Weight: 71 kg (156 lb 8.4 oz)  Body mass index is 26.05 kg/m².    Estimated Creatinine Clearance: 75.8 mL/min (based on SCr of 0.8 mg/dL).    Physical Exam   Constitutional: She is oriented to person, place, and time. She appears well-developed and well-nourished. No distress.   HENT:   Head: Normocephalic and atraumatic.   Tracheostomy in place - no drainage/erythema noted   Eyes: Conjunctivae are normal. Right eye exhibits no discharge. Left eye exhibits no discharge. No scleral icterus.   Neck: Normal range of motion.   Cardiovascular: Regular rhythm. Tachycardia present.   Pulmonary/Chest: Effort normal. No respiratory distress. She has no wheezes. She has no rales.   Abdominal: Soft. Bowel sounds are normal. She exhibits no distension. There is no tenderness. There is no guarding.   Midline surgical  incision c/d/i.    PEG site clear   Genitourinary:   Genitourinary  Comments: Nephrostomy tube in place with yellow output. Site clear. No tenderness.     Fontenot to gravity -  clear yellow urine   Musculoskeletal: Normal range of motion. She exhibits no edema or tenderness (No calf tenderness, swelling, erythema).   Lymphadenopathy:     She has no cervical adenopathy.   Neurological: She is alert and oriented to person, place, and time.   Skin: Skin is warm and dry. No rash noted. She is not diaphoretic.   Small skin tear left arm- no signs of infection  No skin breakdown noted to feet   Buttocks not examined - patient and sister deny open wounds  PICC RUE - site clear   Psychiatric: She has a normal mood and affect. Her behavior is normal.   Nursing note and vitals reviewed.      Significant Labs:   Blood Culture:   Recent Labs   Lab 05/19/19  1843 05/20/19  1300 05/20/19  1306 06/01/19  1712 06/01/19  1713   LABBLOO No growth after 5 days. No growth after 5 days. No growth after 5 days. No Growth to date No Growth to date     CBC:   Recent Labs   Lab 06/01/19  1712 06/02/19  0427   WBC 14.70* 12.91*   HGB 9.5* 8.4*   HCT 30.4* 26.8*    256     CMP:   Recent Labs   Lab 06/02/19  0427   *   K 3.3*      CO2 30*   *   BUN 25*   CREATININE 0.8   CALCIUM 9.5   ANIONGAP 8   EGFRNONAA >60.0     Urine Culture:   Recent Labs   Lab 04/21/19  0640 04/21/19  2256 04/25/19  0252 05/13/19  1846 05/19/19  1154   LABURIN No growth No significant growth No growth CANDIDA ALBICANS  > 100,000 cfu/ml  Treatment of asymptomatic candiduria is not recommended (except for   specific populations). Candida isolated in the urine typically   represents colonization. If an indwelling urinary catheter is present  it should be removed or replaced.  Susceptibility testing not routinely performed   CANDIDA GLABRATA  > 100,000 cfu/ml  Treatment of asymptomatic candiduria is not recommended (except for   specific populations). Candida isolated in the urine typically   represents  colonization. If an indwelling urinary catheter is   present it should be removed or replaced.       Urine Studies:   Recent Labs   Lab 05/19/19  1154   COLORU Argelia   APPEARANCEUA Cloudy*   PHUR 5.0   SPECGRAV 1.020   PROTEINUA 1+*   GLUCUA Negative   KETONESU Negative   BILIRUBINUA Negative   OCCULTUA 1+*   NITRITE Negative   LEUKOCYTESUR 1+*   RBCUA 3   WBCUA 20*   BACTERIA Many*   SQUAMEPITHEL 1   HYALINECASTS 0       Significant Imaging: I have reviewed all pertinent imaging results/findings within the past 24 hours.

## 2019-06-02 NOTE — ASSESSMENT & PLAN NOTE
60 y/o female well known to ID with HTN, DMII, CAD, NSTEMI, s/p L5-S1 OLIF on 4/12 complicated by intraoperative left ureteral injury s/p ureteroureteral anastomoses and ureteral stent placement who initially presented on 4/20 with fevers, AMS and intraabdominal abscess, s/p washout and ligation of left ureter with nephrostomy tube placement on 4/21.  She  has had a long and complicated hospital course (See HPI for chronology).  She is currently on long term antibiotic therapy for E coli and Staph lugdenesis infection with plan for subsequent suppressive therapy given concern for hardware involvement (end date 6/18/19).  Most recently followed by ID on 5/23, completed 7 day course of cefepime for HAP (pseudomonas) on 5/25 and 7 day course of high dose fluconazole for candida glabrata UTI.   ID is now reconsulted for recurrent fever.      Blood cultures 6/1 - NGTD   CXR 6/2 - no changes noted.  Prominent interstitial markings, no consolidations.      Patient denies subjective fevers, chills, sweats.  Denies HA.  + cough, no SOB.  + nausea, no abdominal pain.  No diarrhea.  Abdominal exam benign.  No significant skin breakdown. No calf pain.  PICC (placed 5/24), PEG and trach sites clear.  Correa placed 5/29.  Discussed need for correa with Urology, but apparently needed for persistent urinary retention when foleys removed.      No obvious source of fever.  Will check U/A reflex to culture.  Continue same antibiotics for now and follow.  See    Plan  - Continue current IV cefazolin and  Rifampin.  Estimated end date 6/18/19.    - U/A reflex to culture ordered.   - Follow blood cultures.  Repeat if fever recurs.   - Will follow up tomorrow.     Patient seen by, and plan discussed with, ID staff  Discussed with Primary Team

## 2019-06-02 NOTE — SUBJECTIVE & OBJECTIVE
Interval History:   Febrile to 101.1. Intermittent tachycardia. BPs stable. Tolerating trach collar. Adequate UOP. WBC 12.9 from 14.7 yesterday. ID consulted.    Review of Systems    Objective:     Temp:  [98.9 °F (37.2 °C)-101.1 °F (38.4 °C)] 99.2 °F (37.3 °C)  Pulse:  [] 112  Resp:  [16-32] 16  SpO2:  [90 %-99 %] 95 %  BP: (137-176)/(70-84) 165/84     Body mass index is 26.05 kg/m².      Bladder Scan Volume (mL): 27 mL (05/10/19 0846)  Post Void Cath Residual Output (mL): 27 mL (05/10/19 0846)    Drains     Drain                 Nephrostomy 04/21/19 0629 Left 8 Fr. 40 days         Gastrostomy/Enterostomy 05/02/19 1134 Percutaneous endoscopic gastrostomy (PEG) LUQ decompression;feeding 28 days         Urethral Catheter 05/29/19 0151 2 days                Physical Exam   Constitutional: She appears well-developed. No distress.   HENT:   Head: Normocephalic and atraumatic.   Neck: No JVD present.   Cardiovascular: Normal rate and regular rhythm.    Pulmonary/Chest: Effort normal. No respiratory distress.   Trach collar   Abdominal: Soft. She exhibits no distension. There is no rebound and no guarding.   Incision c/d/i   G-tube   Genitourinary:   Genitourinary Comments: Fontenot in place draining clear yellow urine  Left neph tube with clear yellow urine   Neurological: She is alert.   Skin: Skin is warm and dry. She is not diaphoretic. No pallor.       Significant Labs:    BMP:  Recent Labs   Lab 05/30/19  0400 05/31/19  0535 06/02/19  0427    142 146*   K 3.6 3.8 3.3*    107 108   CO2 26 27 30*   BUN 26* 21* 25*   CREATININE 0.9 0.8 0.8   CALCIUM 9.4 9.5 9.5       CBC:   Recent Labs   Lab 05/31/19  0535 06/01/19  1712 06/02/19  0427   WBC 12.34 14.70* 12.91*   HGB 8.7* 9.5* 8.4*   HCT 28.9* 30.4* 26.8*    315 256     Significant Imaging:  All pertinent imaging results/findings from the past 24 hours have been reviewed.

## 2019-06-02 NOTE — CARE UPDATE
Rapid Response Nurse Chart Check     Chart check completed, abnormal VS noted, bedside RN, Angela contacted, no concerns verbalized at this time. Reported erroneous RR charted by RT. Instructed to call [phone] for further concerns or assistance.

## 2019-06-02 NOTE — SUBJECTIVE & OBJECTIVE
Past Medical History:   Diagnosis Date    Anticoagulant long-term use     Asthma     Back pain     Bradycardia, unspecified 2019    The etiology of the bradycardic episode is unclear.  The have appear to be respiratory in origin (at least the 1st episode).  We will continue to monitor carefully.  We are awaiting evaluation by Cardiology.      CAD (coronary artery disease)     s/p stentimg  (2), (1)    Carotid artery stenosis     Diabetes mellitus type 2 in obese     HTN (hypertension), benign     Hyperlipidemia     Keloid cicatrix     NPDR (nonproliferative diabetic retinopathy) 2015    NSTEMI (non-ST elevated myocardial infarction)     Nuclear sclerosis - Right Eye 3/18/2014    Primary localized osteoarthrosis, lower leg 2014    Sleep apnea        Past Surgical History:   Procedure Laterality Date    BRONCHOSCOPY N/A 2019    Performed by Sean [facility]ano MD at [facility] OR 2ND FLR    CARDIAC CATHETERIZATION      cataract extraction left eye      cataracts      CATHETERIZATION, HEART, LEFT Left 2014    Performed by Wilman Kim MD at [facility] CATH LAB     SECTION, LOW TRANSVERSE      COLONOSCOPY N/A 2019    Performed by Al Alaniz MD at [facility] ENDO (2ND FLR)    CORONARY ANGIOPLASTY      Creation, Nephrostomy, Percutaneous Left 2019    Performed by Karina Surgeon at [facility] KARINA    CREATION, TRACHEOSTOMY N/A 2019    Performed by Sean Ruano MD at [facility] OR 2ND FLR    EGD, WITH PEG TUBE INSERTION  2019    Performed by Sean [facility]ano MD at [facility] OR 2ND FLR    ESOPHAGOGASTRODUODENOSCOPY (EGD) N/A 2016    Performed by Gardenia Adamson MD at [facility] ENDO (4TH FLR)    EXCISION TURBINATE, SUBMUCOUS      FUSION, SPINE, LUMBAR, ANTERIOR APPROACH Left L5-S1 Anterior to Psoa Interbody Fusion, L5-S1 Posterior Instrumentation Left 2019    Performed by Mk George MD at [facility] OR 2ND FLR    HAND SURGERY Left     HAND SURGERY  Right     torn ligament repair/ Dr. Yeboah    HYSTERECTOMY      Injection,steroid,epidural,transforaminal approach - Bilateral - S1 Bilateral 9/25/2018    Performed by [facility]l [facility]u [facility] at [facility] PAIN MGT    Injection-steroid-epidural-caudal N/A 2/7/2019    Performed by Dave Bentley Jr., MD at [facility] PAIN MGT    INSERTION-INTRAOCULAR LENS (IOL) Right 9/1/2015    Performed by Good Domingo MD at [facility] OR 1ST FLR    LAPAROTOMY, EXPLORATORY; LIGATION OF LEFT URETER; DOUBLE J STENT REMOVAL LEFT URETER Left 4/20/2019    Performed by Paul Carlson MD at [facility] OR 2ND FLR    left foot surgery      left wrist surgery      NASAL SEPTUM SURGERY  5/7/15    PHACOEMULSIFICATION-ASPIRATION-CATARACT Right 9/1/2015    Performed by Good Domingo MD at [facility] OR 1ST FLR    REPAIR, URETER  4/12/2019    [facility]    RESECTION-TURBINATES (SMR) N/A 5/7/2015    Performed by Dileep Dubois III, MD at [facility] OR 2ND FLR    rt elbow surgery      S/P LAD COATED STENT  05/14/2010    6 total     S/P OM1 STENT  08/2003    SEPTOPLASTY N/A 5/7/2015    Performed by Dileep Dubois III, MD at [facility] OR 2ND FLR    SIGMOIDOSCOPY, FLEXIBLE N/A 5/21/2019    Performed by ALBERTO Amin MD at [facility] ENDO (2ND FLR)    SIGMOIDOSCOPY, FLEXIBLE N/A 5/13/2019    Performed by ALBERTO Amin MD at [facility] ENDO (2ND FLR)    SINUS SURGERY      F.E.S.S.    SINUS SURGERY FUNCTIONAL ENDOSCOPIC WITH NAVIGATION WITH MAXILLARIES, ETHMOIDS, LEFT SPHENOID, LEFT LOLY N/A 5/7/2015    Performed by Dileep Dubois III, MD at [facility] OR 2ND FLR    STENT, URETERAL placement  4/12/2019    [facility] Robin [facility]    TUBAL LIGATION         Review of patient's allergies indicates:  No Known Allergies    Medications:  Medications Prior to Admission   Medication Sig    albuterol (PROVENTIL/VENTOLIN HFA) 90 mcg/actuation inhaler Inhale 2 puffs into the lungs every 6 (six) hours  "as needed for Wheezing.    amLODIPine (NORVASC) 10 MG tablet TAKE 1 TABLET (10 MG TOTAL) BY MOUTH ONCE DAILY.    aspirin 81 mg Tab Take 81 mg by mouth. 1 Tablet Oral Every day    atorvastatin (LIPITOR) 40 MG tablet TAKE 1 TABLET (40 MG TOTAL) BY MOUTH ONCE DAILY.    ACCU-CHEK EDIN PLUS METER [facility]     BD INSULIN PEN NEEDLE UF MINI 31 x 3/16 " Ndle USE 4 TIMES A DAY    blood sugar diagnostic (ACCU-CHEK EDIN PLUS TEST STRP) Strp TEST BLOOD SUGARS 4 TIMES DAILY    chlorthalidone (HYGROTEN) 50 MG Tab Take 1 tablet (50 mg total) by mouth once daily.    esomeprazole (NEXIUM) 40 MG capsule TAKE 1 CAPSULE (40 MG TOTAL) BY MOUTH BEFORE BREAKFAST.    fenofibrate micronized (LOFIBRA) 134 MG Cap TAKE 1 CAPSULE (134 MG TOTAL) BY MOUTH BEFORE BREAKFAST.    flash glucose scanning reader (FREESTYLE MISTI 14 DAY READER) Misc 1 each by Misc.(Non-Drug; Combo Route) route once daily.    flash glucose sensor (FREESTYLE MISTI 14 DAY SENSOR) Kit 1 each by Misc.(Non-Drug; Combo Route) route once daily.    gabapentin (NEURONTIN) 100 MG capsule Take 2 capsules (200 mg total) by mouth every evening.    insulin aspart U-100 (NOVOLOG FLEXPEN U-100 INSULIN) 100 unit/mL InPn pen INJECT 35 U AM, 45 U AT NOON, 35 U PM.150-200 +2, 201-250 +4, 251-300 +6, 301-350 +8, >350 +10    insulin glargine, TOUJEO, (TOUJEO SOLOSTAR U-300 INSULIN) 300 unit/mL (1.5 mL) InPn pen Inject 50 Units into the skin 2 (two) times daily.    irbesartan (AVAPRO) 300 MG tablet Take 1 tablet (300 mg total) by mouth every evening.    lancets 30 gauge Misc     metoprolol succinate (TOPROL-XL) 100 MG 24 hr tablet TAKE 1 TABLET (100 MG TOTAL) BY MOUTH ONCE DAILY.    nitroGLYCERIN (NITROSTAT) 0.4 MG SL tablet Take one every 2-3 min till chestpain relief for 3 times and if still no relief, call MD or come to ED    omega-3 acid ethyl esters (LOVAZA) 1 gram capsule Take 1 g by mouth once daily.     pen needle, diabetic (BD ULTRA-FINE GRABIEL PEN NEEDLES) 32 gauge x " "5/32" Ndle For use with insulin pens daily 4 times a day    prasugrel (EFFIENT) 10 mg Tab TAKE 1 TABLET (10 MG TOTAL) BY MOUTH ONCE DAILY.    tamsulosin (FLOMAX) 0.4 mg Cap Take 1 capsule (0.4 mg total) by mouth every evening.    tramadol (ULTRAM) 50 mg tablet Take 1 tablet (50 mg total) by mouth 3 (three) times daily as needed for Pain.    TRULICITY 1.5 mg/0.5 mL PnIj INJECT 1.5 MG INTO THE SKIN EVERY 7 DAYS.    zolpidem (AMBIEN) 5 MG Tab Take 1 tablet (5 mg total) by mouth nightly as needed.    [DISCONTINUED] cyclobenzaprine (FLEXERIL) 10 MG tablet TAKE 1 TABLET BY MOUTH 3 TIMES A DAY AS NEEDED FOR MUSCLE SPASMS. NO WORKING OR DRIVING ON THIS MED    [DISCONTINUED] INVOKANA 300 mg Tab tablet Take 1 tablet (300 mg total) by mouth once daily.     Antibiotics (From admission, onward)    Start     Stop Route Frequency Ordered    05/26/19 0600  ceFAZolin injection 2 g      -- IV Every 8 hours (non-standard times) 05/23/19 0825    05/07/19 1500  rifAMpin (RIFADIN) 300 mg in sodium chloride 0.9% 100 mL IVPB      -- IV Every 12 hours (non-standard times) 05/07/19 1249        Antifungals (From admission, onward)    Start     Stop Route Frequency Ordered    05/23/19 0900  fluconazole tablet 800 mg      05/30 0859 PER G TUBE Daily 05/22/19 1636    05/09/19 0930  miconazole nitrate 2% ointment      -- Top 2 times daily 05/09/19 0822        Antivirals (From admission, onward)    None           Immunization History   Administered Date(s) Administered    Influenza 11/19/2009    Influenza - Quadrivalent 10/10/2014, 10/09/2015, 10/24/2016    Influenza - Quadrivalent - PF 11/27/2017    Influenza A (H1N1) 2009 Monovalent - IM 11/19/2009    Pneumococcal Polysaccharide - 23 Valent 06/19/2014    Td - PF (ADULT) 04/17/2017       Family History     Problem Relation (Age of Onset)    Diabetes Mother, Father, Sister, Brother, Sister    Heart attack Father    Heart disease Mother    Leukemia Father    No Known Problems Sister, " Brother, Brother, Maternal Grandmother, Maternal Grandfather, Paternal Grandmother, Paternal Grandfather, Son, Son, Maternal Aunt, Maternal Uncle, Paternal Aunt, Paternal Uncle        Social History     Socioeconomic History    Marital status:      Spouse name: Shamir    Number of children: 2    Years of education: Not on file    Highest education level: Not on file   Occupational History    Occupation: [employer]     Employer: [employer]     Employer: [place] [employer]     Employer: [place] [employer]   Social Needs    Financial resource strain: Not on file    Food insecurity:     Worry: Not on file     Inability: Not on file    Transportation needs:     Medical: Not on file     Non-medical: Not on file   Tobacco Use    Smoking status: Never Smoker    Smokeless tobacco: Never Used   Substance and Sexual Activity    Alcohol use: No     Alcohol/week: 0.0 oz    Drug use: No    Sexual activity: Yes     Partners: Male     Birth control/protection: Post-menopausal     Comment:    Lifestyle    Physical activity:     Days per week: Not on file     Minutes per session: Not on file    Stress: Not on file   Relationships    Social connections:     Talks on phone: Not on file     Gets together: Not on file     Attends [employer] service: Not on file     Active member of club or organization: Not on file     Attends meetings of clubs or organizations: Not on file     Relationship status: Not on file   Other Topics Concern    Are you pregnant or think you may be? No    Breast-feeding No   Social History Narrative    . 2 children.      Review of Systems   Constitutional: Negative for activity change, chills, diaphoresis and fever.   HENT: Negative for congestion and sore throat.    Eyes: Negative.    Respiratory: Positive for cough. Negative for shortness of breath and wheezing.         Trach   Cardiovascular: Negative for chest pain, palpitations and leg swelling.    Gastrointestinal: Positive for nausea. Negative for abdominal pain, diarrhea and vomiting.        Tube feeding     Genitourinary:        Fontenot  Nephrostomy tube     Musculoskeletal: Negative for arthralgias, back pain and myalgias.   Skin: Negative for rash and wound.   Neurological: Negative for dizziness and headaches.   Psychiatric/Behavioral: Negative for agitation and behavioral problems. The patient is not nervous/anxious.      Objective:     Vital Signs (Most Recent):  Temp: 99.2 °F (37.3 °C) (06/02/19 1615)  Pulse: 106 (06/02/19 1615)  Resp: 18 (06/02/19 1615)  BP: (!) 160/77 (06/02/19 1615)  SpO2: 96 % (06/02/19 1615) Vital Signs (24h Range):  Temp:  [98.9 °F (37.2 °C)-99.6 °F (37.6 °C)] 99.2 °F (37.3 °C)  Pulse:  [] 106  Resp:  [16-32] 18  SpO2:  [95 %-99 %] 96 %  BP: (137-171)/(70-86) 160/77     Weight: 71 kg (156 lb 8.4 oz)  Body mass index is 26.05 kg/m².    Estimated Creatinine Clearance: 75.8 mL/min (based on SCr of 0.8 mg/dL).    Physical Exam   Constitutional: She is oriented to person, place, and time. She appears well-developed and well-nourished. No distress.   HENT:   Head: Normocephalic and atraumatic.   Tracheostomy in place - no drainage/erythema noted   Eyes: Conjunctivae are normal. Right eye exhibits no discharge. Left eye exhibits no discharge. No scleral icterus.   Neck: Normal range of motion.   Cardiovascular: Regular rhythm. Tachycardia present.   Pulmonary/Chest: Effort normal. No respiratory distress. She has no wheezes. She has no rales.   Abdominal: Soft. Bowel sounds are normal. She exhibits no distension. There is no tenderness. There is no guarding.   Midline surgical  incision c/d/i.    PEG site clear   Genitourinary:   Genitourinary Comments: Nephrostomy tube in place with yellow output. Site clear. No tenderness.     Fontenot to gravity -  clear yellow urine   Musculoskeletal: Normal range of motion. She exhibits no edema or tenderness (No calf tenderness, swelling,  erythema).   Lymphadenopathy:     She has no cervical adenopathy.   Neurological: She is alert and oriented to person, place, and time.   Skin: Skin is warm and dry. No rash noted. She is not diaphoretic.   Small skin tear left arm- no signs of infection  No skin breakdown noted to feet   Buttocks not examined - patient and sister deny open wounds  PICC RUE - site clear   Psychiatric: She has a normal mood and affect. Her behavior is normal.   Nursing note and vitals reviewed.      Significant Labs:   Blood Culture:   Recent Labs   Lab 05/19/19  1843 05/20/19  1300 05/20/19  1306 06/01/19  1712 06/01/19  1713   LABBLOO No growth after 5 days. No growth after 5 days. No growth after 5 days. No Growth to date No Growth to date     CBC:   Recent Labs   Lab 06/01/19  1712 06/02/19  0427   WBC 14.70* 12.91*   HGB 9.5* 8.4*   HCT 30.4* 26.8*    256     CMP:   Recent Labs   Lab 06/02/19  0427   *   K 3.3*      CO2 30*   *   BUN 25*   CREATININE 0.8   CALCIUM 9.5   ANIONGAP 8   EGFRNONAA >60.0     Urine Culture:   Recent Labs   Lab 04/21/19  0640 04/21/19  2256 04/25/19  0252 05/13/19  1846 05/19/19  1154   LABURIN No growth No significant growth No growth CANDIDA ALBICANS  > 100,000 cfu/ml  Treatment of asymptomatic candiduria is not recommended (except for   specific populations). Candida isolated in the urine typically   represents colonization. If an indwelling urinary catheter is present  it should be removed or replaced.  Susceptibility testing not routinely performed   CANDIDA GLABRATA  > 100,000 cfu/ml  Treatment of asymptomatic candiduria is not recommended (except for   specific populations). Candida isolated in the urine typically   represents colonization. If an indwelling urinary catheter is   present it should be removed or replaced.       Urine Studies:   Recent Labs   Lab 05/19/19  1154   COLORU Argelia   APPEARANCEUA Cloudy*   PHUR 5.0   SPECGRAV 1.020   PROTEINUA 1+*   GLUCUA  Negative   KETONESU Negative   BILIRUBINUA Negative   OCCULTUA 1+*   NITRITE Negative   LEUKOCYTESUR 1+*   RBCUA 3   WBCUA 20*   BACTERIA Many*   SQUAMEPITHEL 1   HYALINECASTS 0       Significant Imaging: I have reviewed all pertinent imaging results/findings within the past 24 hours.

## 2019-06-03 ENCOUNTER — TELEPHONE (OUTPATIENT)
Dept: NEUROSURGERY | Facility: CLINIC | Age: 59
End: 2019-06-03

## 2019-06-03 LAB
POCT GLUCOSE: 143 MG/DL (ref 70–110)
POCT GLUCOSE: 162 MG/DL (ref 70–110)
POCT GLUCOSE: 176 MG/DL (ref 70–110)
POCT GLUCOSE: 191 MG/DL (ref 70–110)
POCT GLUCOSE: 204 MG/DL (ref 70–110)
POCT GLUCOSE: 211 MG/DL (ref 70–110)
POCT GLUCOSE: 221 MG/DL (ref 70–110)

## 2019-06-03 PROCEDURE — 25000003 PHARM REV CODE 250: Performed by: STUDENT IN AN ORGANIZED HEALTH CARE EDUCATION/TRAINING PROGRAM

## 2019-06-03 PROCEDURE — 20600001 HC STEP DOWN PRIVATE ROOM

## 2019-06-03 PROCEDURE — 63600175 PHARM REV CODE 636 W HCPCS: Performed by: STUDENT IN AN ORGANIZED HEALTH CARE EDUCATION/TRAINING PROGRAM

## 2019-06-03 PROCEDURE — 99232 PR SUBSEQUENT HOSPITAL CARE,LEVL II: ICD-10-PCS | Mod: ,,, | Performed by: NURSE PRACTITIONER

## 2019-06-03 PROCEDURE — 97116 GAIT TRAINING THERAPY: CPT

## 2019-06-03 PROCEDURE — 99232 SBSQ HOSP IP/OBS MODERATE 35: CPT | Mod: ,,, | Performed by: NURSE PRACTITIONER

## 2019-06-03 PROCEDURE — 97530 THERAPEUTIC ACTIVITIES: CPT

## 2019-06-03 PROCEDURE — 99900026 HC AIRWAY MAINTENANCE (STAT)

## 2019-06-03 RX ORDER — INSULIN ASPART 100 [IU]/ML
6 INJECTION, SOLUTION INTRAVENOUS; SUBCUTANEOUS
Status: DISCONTINUED | OUTPATIENT
Start: 2019-06-04 | End: 2019-06-05 | Stop reason: HOSPADM

## 2019-06-03 RX ORDER — INSULIN ASPART 100 [IU]/ML
6 INJECTION, SOLUTION INTRAVENOUS; SUBCUTANEOUS
Status: DISCONTINUED | OUTPATIENT
Start: 2019-06-03 | End: 2019-06-05 | Stop reason: HOSPADM

## 2019-06-03 RX ADMIN — INSULIN ASPART 4 UNITS: 100 INJECTION, SOLUTION INTRAVENOUS; SUBCUTANEOUS at 09:06

## 2019-06-03 RX ADMIN — ONDANSETRON 4 MG: 2 INJECTION INTRAMUSCULAR; INTRAVENOUS at 06:06

## 2019-06-03 RX ADMIN — CEFAZOLIN 2 G: 1 INJECTION, POWDER, FOR SOLUTION INTRAMUSCULAR; INTRAVENOUS at 02:06

## 2019-06-03 RX ADMIN — INSULIN ASPART 4 UNITS: 100 INJECTION, SOLUTION INTRAVENOUS; SUBCUTANEOUS at 11:06

## 2019-06-03 RX ADMIN — OXYCODONE HYDROCHLORIDE 10 MG: 10 TABLET ORAL at 02:06

## 2019-06-03 RX ADMIN — INSULIN ASPART 6 UNITS: 100 INJECTION, SOLUTION INTRAVENOUS; SUBCUTANEOUS at 08:06

## 2019-06-03 RX ADMIN — CEFAZOLIN 2 G: 1 INJECTION, POWDER, FOR SOLUTION INTRAMUSCULAR; INTRAVENOUS at 09:06

## 2019-06-03 RX ADMIN — AMLODIPINE BESYLATE 10 MG: 10 TABLET ORAL at 09:06

## 2019-06-03 RX ADMIN — MICONAZOLE NITRATE: 20 OINTMENT TOPICAL at 09:06

## 2019-06-03 RX ADMIN — CHLORHEXIDINE GLUCONATE 0.12% ORAL RINSE 15 ML: 1.2 LIQUID ORAL at 08:06

## 2019-06-03 RX ADMIN — INSULIN ASPART 4 UNITS: 100 INJECTION, SOLUTION INTRAVENOUS; SUBCUTANEOUS at 01:06

## 2019-06-03 RX ADMIN — HYDROMORPHONE HYDROCHLORIDE 1 MG: 1 INJECTION, SOLUTION INTRAMUSCULAR; INTRAVENOUS; SUBCUTANEOUS at 04:06

## 2019-06-03 RX ADMIN — INSULIN ASPART 1 UNITS: 100 INJECTION, SOLUTION INTRAVENOUS; SUBCUTANEOUS at 01:06

## 2019-06-03 RX ADMIN — INSULIN ASPART 4 UNITS: 100 INJECTION, SOLUTION INTRAVENOUS; SUBCUTANEOUS at 05:06

## 2019-06-03 RX ADMIN — INSULIN ASPART 6 UNITS: 100 INJECTION, SOLUTION INTRAVENOUS; SUBCUTANEOUS at 04:06

## 2019-06-03 RX ADMIN — INSULIN ASPART 2 UNITS: 100 INJECTION, SOLUTION INTRAVENOUS; SUBCUTANEOUS at 04:06

## 2019-06-03 RX ADMIN — RIFAMPIN 300 MG: 600 INJECTION, POWDER, LYOPHILIZED, FOR SOLUTION INTRAVENOUS at 02:06

## 2019-06-03 RX ADMIN — CHLORHEXIDINE GLUCONATE 0.12% ORAL RINSE 15 ML: 1.2 LIQUID ORAL at 09:06

## 2019-06-03 RX ADMIN — RIFAMPIN 300 MG: 600 INJECTION, POWDER, LYOPHILIZED, FOR SOLUTION INTRAVENOUS at 03:06

## 2019-06-03 RX ADMIN — PANTOPRAZOLE SODIUM 40 MG: 40 GRANULE, DELAYED RELEASE ORAL at 09:06

## 2019-06-03 RX ADMIN — CEFAZOLIN 2 G: 1 INJECTION, POWDER, FOR SOLUTION INTRAMUSCULAR; INTRAVENOUS at 06:06

## 2019-06-03 NOTE — PLAN OF CARE
Problem: Adult Inpatient Plan of Care  Goal: Plan of Care Review  Outcome: Ongoing (interventions implemented as appropriate)  VS stable. Telemetry= -110s. Scheduled Novolog given SQ, GLucose= 170-200 overnight. Remains NPO, SpO2=96-98% on 28% trach collar.  at the bedside. Adkins catheter in place and left NT in place with sanford urine. TF at 45ml/hr, no residuals noted. WCTM

## 2019-06-03 NOTE — ASSESSMENT & PLAN NOTE
Infection may elevate BG readings  Most likely cause for recent BG excursions.   Managed per primary team  Avoid hypoglycemia    Lab Results   Component Value Date    WBC 12.91 (H) 06/02/2019    HGB 8.4 (L) 06/02/2019    HCT 26.8 (L) 06/02/2019    MCV 92 06/02/2019     06/02/2019

## 2019-06-03 NOTE — PROGRESS NOTES
GENERAL SURGERY    Asked to downsize patient's trach by Urology team. #8 cuffed Shiley originally placed by us on 5/2/19. Patient has been stable on trach collar on the floor.   Exchanged for #6 cuffed Shiley without issue.   Please call with questions or concerns.     Talon Bhatti MD  General Surgery PGY-II  Pager: [phone]

## 2019-06-03 NOTE — PLAN OF CARE
Problem: Physical Therapy Goal  Goal: Physical Therapy Goal  Goals to be met by:19    Patient will increase functional independence with mobility by performin. Supine to sit with Moderate Assistance - met  Revised goal: supine to sit through sidelying with minimal assist-not met  2. Sit to stand transfer with Minimal Assistance -not met  3. Gait  x 30 feet with Contact Guard Assistance using Rolling Walker. -not met  4. Lower extremity exercise program x15 reps , with assistance as needed- met        Outcome: Ongoing (interventions implemented as appropriate)  Pt's goals remain appropriate and pt will continue to benefit from skilled PT services to work towards improved functional mobility including: bed mobility, transfers, and gait.   Anastacia Liriano, PT  6/3/2019

## 2019-06-03 NOTE — PROGRESS NOTES
[facility]  Urology  Progress Note    Patient Name: Mariann Huff  MRN: [id number]  Admission Date: 4/20/2019  Hospital Length of Stay: 44 days  Code Status: Full Code   Attending Provider: Robin Boyd MD   Primary Care Physician: Jasbir Haney MD    Subjective:     HPI:  Mariann Huff is a 59 y.o. female with history of HTN, type 2 diabetes mellitus, CAD, NSTEMI, and back pain who presents to [facility] with altered mental status and sepsis. She underwent L5-S1 OLIF with NSGY on 4/12 and had intraoperative left ureteral injury with ureteroureteral anastomosis and ureteral stent placement. She did well initially and had correa and MADHURI drain removed on 4/16. She began having chills and altered mental status 2 days ago and this has progressively worsened. No complaints of pain.     She is altered and HPI is limited. In the ED she is febrile to 103 and tachycardic and pressures are low normal. WBC is 5, creatinine is 3.6 baseline 1.0, lactate is 4.6. Cath UA concerning for infection, 3+ LE, >100 WBCs, and many bacteria on microscopy.    CT and MRI abdomen both show air with minimal fluid near the surgical site with left ureter coursing through. There is air throughout the proximal collecting system which is decompressed with JJ ureteral stent in good position.    Taken for ex lap, ligation of left ureter and left neph tube placement on 4/21/19.    Interval History: No acute events. Patient with continued low-grade tachycardia. Afebrile overnight. On trach collar 5L 28% FiO2, tolerating well. Adequate UOP from nephrostomy tube and urethral catheter. No labs this AM.     Review of Systems   Constitutional: Positive for activity change.     Objective:     Temp:  [98.7 °F (37.1 °C)-99.6 °F (37.6 °C)] 98.8 °F (37.1 °C)  Pulse:  [100-112] 111  Resp:  [16-20] 18  SpO2:  [93 %-100 %] 97 %  BP: (137-165)/(68-86) 151/79     Body mass index is 26.05 kg/m².      Bladder Scan Volume (mL): 27 mL (05/10/19 0846)  Post  Void Cath Residual Output (mL): 27 mL (05/10/19 0846)    Drains     Drain                 Nephrostomy 04/21/19 0629 Left 8 Fr. 42 days         Gastrostomy/Enterostomy 05/02/19 1134 Percutaneous endoscopic gastrostomy (PEG) LUQ decompression;feeding 31 days         Urethral Catheter 05/29/19 0151 5 days                Physical Exam   Constitutional: She appears well-developed. No distress.   HENT:   Head: Normocephalic and atraumatic.   Neck: No JVD present.   Cardiovascular: Normal rate and regular rhythm.    Pulmonary/Chest: Effort normal. No respiratory distress.   Trach collar   Abdominal: Soft. She exhibits no distension. There is no rebound and no guarding.   Incision c/d/i   G-tube   Genitourinary:   Genitourinary Comments: Fontenot in place draining clear yellow urine  Left neph tube with clear yellow urine   Neurological: She is alert.   Skin: Skin is warm and dry. She is not diaphoretic. No pallor.         Significant Labs:    BMP:  Recent Labs   Lab 05/30/19  0400 05/31/19  0535 06/02/19  0427    142 146*   K 3.6 3.8 3.3*    107 108   CO2 26 27 30*   BUN 26* 21* 25*   CREATININE 0.9 0.8 0.8   CALCIUM 9.4 9.5 9.5       CBC:   Recent Labs   Lab 05/31/19  0535 06/01/19  1712 06/02/19  0427   WBC 12.34 14.70* 12.91*   HGB 8.7* 9.5* 8.4*   HCT 28.9* 30.4* 26.8*    315 256     Significant Imaging:  All pertinent imaging results/findings from the past 24 hours have been reviewed.                  Assessment/Plan:     * Ureteral transection of left native kidney  Mariann Huff is a 59 y.o. female s/p left ureteral injury on 4/12, presented with fever, AMS, and intraabdominal abscess, taken for washout and ligation of left ureter with neph tube placement on 4/21, Trach/PEG 5/2.  - on trach collar, Speech on board  - continue TF  - ID on board, appreciate recs:   - continue rifampin; completed cefepime x 7 days, now on Ancef; continue rifampin and Ancef until 6/18/19   - 5/13 UCx growing Candida  albicans; 7 day course of diflucan 800 mg now complete   - 5/19 BAL growing Pseudomonas   - 6/1 repeat blood cultures - NGTD   - ID on board, appreciate recs  - Maintain neph tube and correa; patient failed VT on 5/28/19  - Urine output adequate  - bloody BMs - flexible sigmoidoscopy on 5/21/19 showed no signs of bleeding, Colonoscopy showed healing rectal ulcer, EGD not indicated per GI; H&H stable  - heparin for RUE DVT held due to GI bleeding, patient found on 5/29 to have RLE DVT; IVC filter placed 5/29  - labs every other day    Will consult general surgery regarding downsizing of tracheostomy    Dispo: patient stable on trach collar, pending acceptance to rehab facility    Sepsis  As above        VTE Risk Mitigation (From admission, onward)        Ordered     IP VTE LOW RISK PATIENT  Once      05/08/19 1315     Place sequential compression device  Until discontinued      04/20/19 0786          Mook Ferguson MD  Urology  [facility]

## 2019-06-03 NOTE — ASSESSMENT & PLAN NOTE
BG goal 140 - 180    BG continues to trend upward above goal while on TF.   Increase Novolog to 6 units every 4 hours while on TF. BG is above goal. Patient has previously proven to tolerate this dose/regimen, and continues to require correction scale. Please hold if TF is stopped. (Moderate Dose SQ Insulin Correction Scale PRN for BG excursions. Kidney function WNL  BG monitoring q 4 hrs.     ** Please notify Endocrine for any change and/or advance in diet/TF**  ** Please call Endocrine for any BG related issues **    Discharge Recommendations:     TBD.

## 2019-06-03 NOTE — ASSESSMENT & PLAN NOTE
Mariann Huff is a 59 y.o. female s/p left ureteral injury on 4/12, presented with fever, AMS, and intraabdominal abscess, taken for washout and ligation of left ureter with neph tube placement on 4/21, Trach/PEG 5/2.  - on trach collar, Speech on board  - continue TF  - ID on board, appreciate recs:   - continue rifampin; completed cefepime x 7 days, now on Ancef; continue rifampin and Ancef until 6/18/19   - 5/13 UCx growing Candida albicans; 7 day course of diflucan 800 mg now complete   - 5/19 BAL growing Pseudomonas   - 6/1 repeat blood cultures - NGTD   - ID on board, appreciate recs  - Maintain neph tube and correa; patient failed VT on 5/28/19  - Urine output adequate  - bloody BMs - flexible sigmoidoscopy on 5/21/19 showed no signs of bleeding, Colonoscopy showed healing rectal ulcer, EGD not indicated per GI; H&H stable  - heparin for RUE DVT held due to GI bleeding, patient found on 5/29 to have RLE DVT; IVC filter placed 5/29  - labs every other day    Will consult general surgery regarding downsizing of tracheostomy    Dispo: patient stable on trach collar, pending acceptance to rehab facility

## 2019-06-03 NOTE — SUBJECTIVE & OBJECTIVE
Interval History: No acute events. Patient with continued low-grade tachycardia. Afebrile overnight. On trach collar 5L 28% FiO2, tolerating well. Adequate UOP from nephrostomy tube and urethral catheter. No labs this AM.     Review of Systems   Constitutional: Positive for activity change.     Objective:     Temp:  [98.7 °F (37.1 °C)-99.6 °F (37.6 °C)] 98.8 °F (37.1 °C)  Pulse:  [100-112] 111  Resp:  [16-20] 18  SpO2:  [93 %-100 %] 97 %  BP: (137-165)/(68-86) 151/79     Body mass index is 26.05 kg/m².      Bladder Scan Volume (mL): 27 mL (05/10/19 0846)  Post Void Cath Residual Output (mL): 27 mL (05/10/19 0846)    Drains     Drain                 Nephrostomy 04/21/19 0629 Left 8 Fr. 42 days         Gastrostomy/Enterostomy 05/02/19 1134 Percutaneous endoscopic gastrostomy (PEG) LUQ decompression;feeding 31 days         Urethral Catheter 05/29/19 0151 5 days                Physical Exam   Constitutional: She appears well-developed. No distress.   HENT:   Head: Normocephalic and atraumatic.   Neck: No JVD present.   Cardiovascular: Normal rate and regular rhythm.    Pulmonary/Chest: Effort normal. No respiratory distress.   Trach collar   Abdominal: Soft. She exhibits no distension. There is no rebound and no guarding.   Incision c/d/i   G-tube   Genitourinary:   Genitourinary Comments: Fontenot in place draining clear yellow urine  Left neph tube with clear yellow urine   Neurological: She is alert.   Skin: Skin is warm and dry. She is not diaphoretic. No pallor.         Significant Labs:    BMP:  Recent Labs   Lab 05/30/19  0400 05/31/19  0535 06/02/19  0427    142 146*   K 3.6 3.8 3.3*    107 108   CO2 26 27 30*   BUN 26* 21* 25*   CREATININE 0.9 0.8 0.8   CALCIUM 9.4 9.5 9.5       CBC:   Recent Labs   Lab 05/31/19  0535 06/01/19  1712 06/02/19  0427   WBC 12.34 14.70* 12.91*   HGB 8.7* 9.5* 8.4*   HCT 28.9* 30.4* 26.8*    315 256     Significant Imaging:  All pertinent imaging results/findings  from the past 24 hours have been reviewed.

## 2019-06-03 NOTE — SUBJECTIVE & OBJECTIVE
"Interval HPI:   Overnight events:  BG is within or slightly above goal on current SQ insulin regimen.   NAEON     Eating:   NPO  Nausea: No  Hypoglycemia and intervention: No  Fever: No  TPN and/or TF: Yes (TF)  If yes, type of TF/TPN and rate: 45 ml/hr    BP (!) 144/70 (BP Location: Left arm, Patient Position: Lying)   Pulse 107   Temp 97.4 °F (36.3 °C) (Oral)   Resp 18   Ht 5' 5" (1.651 m)   Wt 71 kg (156 lb 8.4 oz)   SpO2 100%   Breastfeeding? No   BMI 26.05 kg/m²     Labs Reviewed and Include    No results for input(s): GLU, CALCIUM, ALBUMIN, PROT, NA, K, CO2, CL, BUN, CREATININE, ALKPHOS, ALT, AST, BILITOT in the last 24 hours.  Lab Results   Component Value Date    WBC 12.91 (H) 06/02/2019    HGB 8.4 (L) 06/02/2019    HCT 26.8 (L) 06/02/2019    MCV 92 06/02/2019     06/02/2019     No results for input(s): TSH, FREET4 in the last 168 hours.  Lab Results   Component Value Date    HGBA1C 5.4 05/24/2019       Nutritional status:   Body mass index is 26.05 kg/m².  Lab Results   Component Value Date    ALBUMIN 1.5 (L) 05/28/2019    ALBUMIN 1.5 (L) 05/27/2019    ALBUMIN 1.6 (L) 05/26/2019     No results found for: PREALBUMIN    Estimated Creatinine Clearance: 75.8 mL/min (based on SCr of 0.8 mg/dL).    Accu-Checks  Recent Labs     06/02/19  0059 06/02/19  0515 06/02/19  0822 06/02/19  1151 06/02/19  1608 06/02/19  2051 06/03/19  0106 06/03/19  0502 06/03/19  0905 06/03/19  1130   POCTGLUCOSE 181* 154* 180* 175* 146* 179* 191* 211* 221* 204*       Current Medications and/or Treatments Impacting Glycemic Control  Immunotherapy:    Immunosuppressants     None        Steroids:   Hormones (From admission, onward)    None        Pressors:    Autonomic Drugs (From admission, onward)    None        Hyperglycemia/Diabetes Medications:   Antihyperglycemics (From admission, onward)    Start     Stop Route Frequency Ordered    06/02/19 0800  insulin aspart U-100 pen 4 Units      -- SubQ Every 24 hours " (non-standard times) 06/01/19 1103    06/02/19 0400  insulin aspart U-100 pen 4 Units      -- SubQ Every 24 hours (non-standard times) 06/01/19 1103    06/02/19 0000  insulin aspart U-100 pen 4 Units      -- SubQ Every 24 hours (non-standard times) 06/01/19 1103    06/01/19 2000  insulin aspart U-100 pen 4 Units      -- SubQ Every 24 hours (non-standard times) 06/01/19 1103    06/01/19 1600  insulin aspart U-100 pen 4 Units      -- SubQ Every 24 hours (non-standard times) 06/01/19 1103    06/01/19 1200  insulin aspart U-100 pen 4 Units      -- SubQ Every 24 hours (non-standard times) 06/01/19 1103    05/31/19 1223  insulin aspart U-100 pen 1-10 Units      -- SubQ Every 4 hours PRN 05/31/19 1124

## 2019-06-03 NOTE — TELEPHONE ENCOUNTER
----- Message from Mk George MD sent at 6/1/2019  2:41 AM CDT -----  Cancel her appointment this Tuesday. She is still hospitalized at [facility]. Replace her appointment with a new consult.    Thanks    DD

## 2019-06-03 NOTE — SUBJECTIVE & OBJECTIVE
Interval History: Pt without complaints.  WBC downtrending.  T max 100. UA trace leukocytes.     Review of Systems   Constitutional: Negative for activity change, chills, diaphoresis and fever.   HENT: Negative for congestion and sore throat.    Eyes: Negative.    Respiratory: Positive for cough. Negative for shortness of breath and wheezing.         Trach   Cardiovascular: Negative for chest pain, palpitations and leg swelling.   Gastrointestinal: Positive for nausea. Negative for abdominal pain, diarrhea and vomiting.        Tube feeding     Genitourinary:        Fontenot  Nephrostomy tube     Musculoskeletal: Negative for arthralgias, back pain and myalgias.   Skin: Negative for rash and wound.   Neurological: Negative for dizziness and headaches.   Psychiatric/Behavioral: Negative for agitation and behavioral problems. The patient is not nervous/anxious.      Objective:     Vital Signs (Most Recent):  Temp: 97.4 °F (36.3 °C) (06/03/19 1207)  Pulse: 107 (06/03/19 1207)  Resp: 18 (06/03/19 1207)  BP: (!) 144/70 (06/03/19 1207)  SpO2: 100 % (06/03/19 1207) Vital Signs (24h Range):  Temp:  [97.4 °F (36.3 °C)-100 °F (37.8 °C)] 97.4 °F (36.3 °C)  Pulse:  [100-112] 107  Resp:  [16-20] 18  SpO2:  [93 %-100 %] 100 %  BP: (137-162)/(68-79) 144/70     Weight: 71 kg (156 lb 8.4 oz)  Body mass index is 26.05 kg/m².    Estimated Creatinine Clearance: 75.8 mL/min (based on SCr of 0.8 mg/dL).    Physical Exam   Constitutional: She is oriented to person, place, and time. She appears well-developed and well-nourished. No distress.   HENT:   Head: Normocephalic and atraumatic.   Tracheostomy in place - no drainage/erythema noted   Eyes: Conjunctivae are normal. Right eye exhibits no discharge. Left eye exhibits no discharge. No scleral icterus.   Neck: Normal range of motion.   Cardiovascular: Regular rhythm. Tachycardia present.   Pulmonary/Chest: Effort normal. No respiratory distress. She has no wheezes. She has no rales.    Abdominal: Soft. Bowel sounds are normal. She exhibits no distension. There is no tenderness. There is no guarding.   Midline surgical  incision c/d/i.    PEG site clear   Genitourinary:   Genitourinary Comments: Nephrostomy tube in place with yellow output. Site clear. No tenderness.     Fontenot to gravity -  clear yellow urine   Musculoskeletal: Normal range of motion. She exhibits no edema or tenderness (No calf tenderness, swelling, erythema).   Lymphadenopathy:     She has no cervical adenopathy.   Neurological: She is alert and oriented to person, place, and time.   Skin: Skin is warm and dry. No rash noted. She is not diaphoretic.   Small skin tear left arm- no signs of infection  No skin breakdown noted to feet   PICC RUE - site clear   Psychiatric: She has a normal mood and affect. Her behavior is normal.   Nursing note and vitals reviewed.      Significant Labs:   Blood Culture:   Recent Labs   Lab 05/19/19  1843 05/20/19  1300 05/20/19  1306 06/01/19 1712 06/01/19 1713   LABBLOO No growth after 5 days. No growth after 5 days. No growth after 5 days. No Growth to date  No Growth to date No Growth to date  No Growth to date     BMP:   Recent Labs   Lab 06/02/19  0427   *   *   K 3.3*      CO2 30*   BUN 25*   CREATININE 0.8   CALCIUM 9.5     CBC:   Recent Labs   Lab 06/01/19  1712 06/02/19  0427   WBC 14.70* 12.91*   HGB 9.5* 8.4*   HCT 30.4* 26.8*    256     CMP:   Recent Labs   Lab 06/02/19  0427   *   K 3.3*      CO2 30*   *   BUN 25*   CREATININE 0.8   CALCIUM 9.5   ANIONGAP 8   EGFRNONAA >60.0     Microbiology Results (last 7 days)     Procedure Component Value Units Date/Time    Blood culture [[id number]] Collected:  06/01/19 1713    Order Status:  Completed Specimen:  Blood Updated:  06/02/19 2012     Blood Culture, Routine No Growth to date     Blood Culture, Routine No Growth to date    Blood culture [[id number]] Collected:  06/01/19 1712    Order  Status:  Completed Specimen:  Blood Updated:  06/02/19 2012     Blood Culture, Routine No Growth to date     Blood Culture, Routine No Growth to date        Wound Culture:   Recent Labs   Lab 04/21/19  0125 05/19/19  1241   LABAERO STAPHYLOCOCCUS LUGDUNENSIS  Rare   No growth     All pertinent labs within the past 24 hours have been reviewed.    Significant Imaging: I have reviewed all pertinent imaging results/findings within the past 24 hours.

## 2019-06-03 NOTE — PLAN OF CARE
Problem: Occupational Therapy Goal  Goal: Occupational Therapy Goal  Goals to be met by: 6/5/19     Patient will increase functional independence with ADLs by performing:    UE Dressing with Set-up Assistance.  LE Dressing with Maximum Assistance and Assistive Devices as needed.  Grooming while standing with Minimal Assistance.  Toileting from bedside commode with Minimal Assistance for hygiene and clothing management.   Sitting at edge of bed x10 minutes with Supervision.  Rolling to Bilateral with Minimal Assistance.   Supine to sit with Minimal Assistance.  Toilet transfer to bedside commode with Minimal Assistance.      Outcome: Ongoing (interventions implemented as appropriate)  Continue OT POC.  Ayesha Myers OT  6/3/2019

## 2019-06-03 NOTE — PROGRESS NOTES
"[facility]  Endocrinology  Progress Note    Admit Date: 4/20/2019     Reason for Consult: Management of T2DM, Hyperglycemia     Surgical Procedure and Date:     04/12/2019: L5-S1 fusion per Dr George in [facility]     04/21/2019:         1. Exploratory laparotomy        2. Ligation of left ureter        3. Removal of left JJ ureteral stent        Diabetes diagnosis year: ANDRE    Home Diabetes Medications:  ANDRE  Toujeo 50 BID  Invokana 300mg daily   Novolog 35 units AM, 45 units PM, 35 units PM    How often checking glucose at home? ANDRE   BG readings on regimen: ANDRE  Hypoglycemia on the regimen?  ANDRE  Missed doses on regimen?  ANDRE    Diabetes Complications include:     Hyperglycemia and Diabetic retinopathy     Complicating diabetes co morbidities:   History of MI and MURTAZA, CAD, HLD    HPI:   Patient is a 59 y.o. female with a diagnosis of HTN, HLN, DM type 2, nonproliferative diabetic renopathy, CAD, NSTEMI, and back pain who presents to the ED with a complaint of altered mental status on 04/20/2019. Is now s/p Exploratory laparotomy, Ligation of left ureter, and Removal of left JJ ureteral stent. Endocrinology consulted to manage DM2/Hyperglycemia.    Lab Results   Component Value Date    HGBA1C 10.8 (H) 02/19/2019       Interval HPI:   Overnight events:  BG is within or slightly above goal on current SQ insulin regimen.   NAEON     Eating:   NPO  Nausea: No  Hypoglycemia and intervention: No  Fever: No  TPN and/or TF: Yes (TF)  If yes, type of TF/TPN and rate: 45 ml/hr    BP (!) 144/70 (BP Location: Left arm, Patient Position: Lying)   Pulse 107   Temp 97.4 °F (36.3 °C) (Oral)   Resp 18   Ht 5' 5" (1.651 m)   Wt 71 kg (156 lb 8.4 oz)   SpO2 100%   Breastfeeding? No   BMI 26.05 kg/m²      Labs Reviewed and Include    No results for input(s): GLU, CALCIUM, ALBUMIN, PROT, NA, K, CO2, CL, BUN, CREATININE, ALKPHOS, ALT, AST, BILITOT in the last 24 hours.  Lab Results   Component Value Date    WBC " 12.91 (H) 06/02/2019    HGB 8.4 (L) 06/02/2019    HCT 26.8 (L) 06/02/2019    MCV 92 06/02/2019     06/02/2019     No results for input(s): TSH, FREET4 in the last 168 hours.  Lab Results   Component Value Date    HGBA1C 5.4 05/24/2019       Nutritional status:   Body mass index is 26.05 kg/m².  Lab Results   Component Value Date    ALBUMIN 1.5 (L) 05/28/2019    ALBUMIN 1.5 (L) 05/27/2019    ALBUMIN 1.6 (L) 05/26/2019     No results found for: PREALBUMIN    Estimated Creatinine Clearance: 75.8 mL/min (based on SCr of 0.8 mg/dL).    Accu-Checks  Recent Labs     06/02/19  0059 06/02/19  0515 06/02/19  0822 06/02/19  1151 06/02/19  1608 06/02/19  2051 06/03/19  0106 06/03/19  0502 06/03/19  0905 06/03/19  1130   POCTGLUCOSE 181* 154* 180* 175* 146* 179* 191* 211* 221* 204*       Current Medications and/or Treatments Impacting Glycemic Control  Immunotherapy:    Immunosuppressants     None        Steroids:   Hormones (From admission, onward)    None        Pressors:    Autonomic Drugs (From admission, onward)    None        Hyperglycemia/Diabetes Medications:   Antihyperglycemics (From admission, onward)    Start     Stop Route Frequency Ordered    06/02/19 0800  insulin aspart U-100 pen 4 Units      -- SubQ Every 24 hours (non-standard times) 06/01/19 1103    06/02/19 0400  insulin aspart U-100 pen 4 Units      -- SubQ Every 24 hours (non-standard times) 06/01/19 1103    06/02/19 0000  insulin aspart U-100 pen 4 Units      -- SubQ Every 24 hours (non-standard times) 06/01/19 1103    06/01/19 2000  insulin aspart U-100 pen 4 Units      -- SubQ Every 24 hours (non-standard times) 06/01/19 1103    06/01/19 1600  insulin aspart U-100 pen 4 Units      -- SubQ Every 24 hours (non-standard times) 06/01/19 1103    06/01/19 1200  insulin aspart U-100 pen 4 Units      -- SubQ Every 24 hours (non-standard times) 06/01/19 1103    05/31/19 1223  insulin aspart U-100 pen 1-10 Units      -- SubQ Every 4 hours PRN 05/31/19 1124           ASSESSMENT and PLAN    * Ureteral transection of left native kidney  Managed per primary team  Avoid hypoglycemia        Type 2 diabetes mellitus with diabetic peripheral angiopathy without gangrene  BG goal 140 - 180    BG continues to trend upward above goal while on TF.   Increase Novolog to 6 units every 4 hours while on TF. BG is above goal. Patient has previously proven to tolerate this dose/regimen, and continues to require correction scale. Please hold if TF is stopped. (Moderate Dose SQ Insulin Correction Scale PRN for BG excursions. Kidney function WNL  BG monitoring q 4 hrs.     ** Please notify Endocrine for any change and/or advance in diet/TF**  ** Please call Endocrine for any BG related issues **    Discharge Recommendations:     TBD.             CAD (coronary artery disease)  Managed per primary team  Condition may cause insulin resistance       HLD (hyperlipidemia)  Managed per primary team  Condition may cause insulin resistance         Sepsis  Infection may elevate BG readings  Most likely cause for recent BG excursions.   Managed per primary team  Avoid hypoglycemia    Lab Results   Component Value Date    WBC 12.91 (H) 06/02/2019    HGB 8.4 (L) 06/02/2019    HCT 26.8 (L) 06/02/2019    MCV 92 06/02/2019     06/02/2019         Sleep apnea  May affect BG readings if uncontrolled        On enteral nutrition  May cause BG excursions.           Uriah Holder, NP  Endocrinology  [facility]

## 2019-06-03 NOTE — PT/OT/SLP PROGRESS
Physical Therapy Treatment    Patient Name:  Mariann Huff   MRN:  [id number]    Recommendations:     Discharge Recommendations:  rehabilitation facility   Discharge Equipment Recommendations: (TBD as pt progresses)   Barriers to discharge: Decreased caregiver support requires assist for mobility    Assessment:     Mariann Huff is a 59 y.o. female admitted with a medical diagnosis of Ureteral transection of left native kidney.  She presents with the following impairments/functional limitations:  weakness, impaired endurance, gait instability, decreased lower extremity function, impaired balance, pain, decreased safety awareness, impaired cardiopulmonary response to activity . Pt is motivated to progress with mobility.    Rehab Prognosis: Good; patient would benefit from acute skilled PT services to address these deficits and reach maximum level of function.    Recent Surgery: Procedure(s) (LRB):  COLONOSCOPY (N/A) 10 Days Post-Op    Plan:     During this hospitalization, patient to be seen 4 x/week to address the identified rehab impairments via gait training, therapeutic activities, therapeutic exercises, neuromuscular re-education and progress toward the following goals:    · Plan of Care Expires:  06/03/19    Subjective   Pt c/o R LE pain  Pain/Comfort:  · Pain Rating 1: 10/10  · Location - Side 1: Right  · Location - Orientation 1: lower  · Location 1: leg  · Pain Addressed 1: Pre-medicate for activity, Reposition, Cessation of Activity  · Pain Rating Post-Intervention 1: 10/10      Objective:     Communicated with nurse prior to session.  Patient found supine with correa catheter, PEG Tube, oxygen, tracheostomy, telemetry, pulse ox (continuous) upon PT entry to room.     General Precautions: Standard, fall   Orthopedic Precautions:N/A   Braces: LSO     Functional Mobility:  · Bed Mobility:     · Rolling Left:  minimum assistance  · Supine to Sit: moderate assistance  · Transfers:     · Sit to Stand:  minimum  assistance x 2 trials from bedside chair and moderate assistance x 4 trials from bedside chair with rolling walker  · Bed to Chair: moderate assistance with  no AD  using  Stand Pivot  · Gait: 25ft x 3 trials with RW with minimal assist +1 to follow with bedside chair. pt performed gait with flexed trunk, decreased LAKE due to difficulty with L foot placement, and at slow pace.    AM-PAC 6 CLICK MOBILITY  Turning over in bed (including adjusting bedclothes, sheets and blankets)?: 3  Sitting down on and standing up from a chair with arms (e.g., wheelchair, bedside commode, etc.): 2  Moving from lying on back to sitting on the side of the bed?: 2  Moving to and from a bed to a chair (including a wheelchair)?: 2  Need to walk in hospital room?: 2  Climbing 3-5 steps with a railing?: 1  Basic Mobility Total Score: 12     Therapeutic Activities and Exercises:   pt had BM and stood x 3 trials for ~ 2 min each trial with RW with minimal assist to be cleaned and linen changed.    Patient left up in chair with all lines intact, call button in reach and nurse notified..    GOALS:   Multidisciplinary Problems     Physical Therapy Goals        Problem: Physical Therapy Goal    Goal Priority Disciplines Outcome Goal Variances Interventions   Physical Therapy Goal     PT, PT/OT Ongoing (interventions implemented as appropriate)     Description:  Goals to be met by:19    Patient will increase functional independence with mobility by performin. Supine to sit with Moderate Assistance - met  Revised goal: supine to sit through sidelying with minimal assist-not met  2. Sit to stand transfer with Minimal Assistance -not met  3. Gait  x 30 feet with Contact Guard Assistance using Rolling Walker. -not met  4. Lower extremity exercise program x15 reps , with assistance as needed- met                          Time Tracking:     PT Received On: 19  PT Start Time: 825     PT Stop Time: 858  PT Total Time (min): 33 min      Billable Minutes: Gait Training 20 and Therapeutic Activity 13    Treatment Type: Treatment  PT/PTA: PT     PTA Visit Number: 0     Anastacia Liriano, PT  06/03/2019

## 2019-06-03 NOTE — ASSESSMENT & PLAN NOTE
60 y/o female well known to ID with HTN, DMII, CAD, NSTEMI, s/p L5-S1 OLIF on 4/12 complicated by intraoperative left ureteral injury s/p ureteroureteral anastomoses and ureteral stent placement who initially presented on 4/20 with fevers, AMS and intraabdominal abscess, s/p washout and ligation of left ureter with nephrostomy tube placement on 4/21.  She  has had a long and complicated hospital course (See HPI for chronology).  She is currently on long term antibiotic therapy for E coli and Staph lugdenesis infection with plan for subsequent suppressive therapy given concern for hardware involvement (end date 6/18/19).  Most recently followed by ID on 5/23, completed 7 day course of cefepime for HAP (pseudomonas) on 5/25 and 7 day course of high dose fluconazole for candida glabrata UTI.   ID is now reconsulted for recurrent fever.      Blood cultures 6/1 - NGTD   CXR 6/2 - no changes noted.  Prominent interstitial markings, no consolidations.      Patient denies subjective fevers, chills, sweats.  Denies HA.  + cough, no SOB.  + nausea, no abdominal pain.  No diarrhea.  Abdominal exam benign.  No significant skin breakdown. No calf pain.  PICC (placed 5/24), PEG and trach sites clear.  Correa placed 5/29.  Discussed need for correa with Urology, but apparently needed for persistent urinary retention when foleys removed.      No obvious source of fever.  U/A trace leukocytes.  Blood cxs NGTD.    Plan  - Continue current IV cefazolin and Rifampin as previously outlined.  Estimated end date 6/18/19.    - No obvious source of infection.  Pt no longer w/ F and no complaints.  - ESR, CRP, CBC, CMP, and Vanc trough need to be drawn weekly as an outpatient with results faxed to the ID Department [phone]  -While on ancef/RIF needs on Mondays weekly ESR, CRP, CBC, CMP faxed to ID dept at [phone]   - if going to LTAC then consult ID to follow.  - patient can fu in ID clinic 14 d from DC or after DC from LTAC  -  Will sign off.  Please reconsult w/ any new infectious concerns.    Patient seen by, and plan discussed with, ID staff  Discussed with Primary Team

## 2019-06-03 NOTE — PLAN OF CARE
06/03/19 1620   Post-Acute Status   Post-Acute Authorization Placement   Post-Acute Placement Status Referrals Sent     Per , pt wants [facility] as their first choice.  They are agreeable to a referral being sent to [facility].  A PMR consult has already been placed.  These were the only 2 rehabs on the pt's insurance list in the area.  NICK sent a referral request to the [facility] for these 2 facilities.  NICK will f/u tomorrow.    Ирина Godinez, FRED x [phone]

## 2019-06-03 NOTE — PT/OT/SLP PROGRESS
Occupational Therapy   Treatment    Name: Mariann Huff  MRN: [id number]  Admitting Diagnosis:  Ureteral transection of left native kidney  10 Days Post-Op    Recommendations:     Discharge Recommendations: rehabilitation facility  Discharge Equipment Recommendations:  (tbd)  Barriers to discharge:  Decreased caregiver support    Assessment:     Mariann Huff is a 59 y.o. female with a medical diagnosis of Ureteral transection of left native kidney.  She presents with impaired ADL and functional mobility performance as limited by generalized weakness and impaired cardiopulmonary performance. Pt found recently returned from bedside chair>bed as pt unable to tolerate prolonged sitting. Pt pleasant this date with session focus on LSO education with donning/doffing as well as discussion related to spinal precautions. Pt then participated in UE therapeutic ROM to improve daily skill ability. Performance deficits affecting function are weakness, impaired endurance, impaired self care skills, impaired functional mobilty, gait instability, impaired balance, decreased lower extremity function, decreased safety awareness, impaired cardiopulmonary response to activity, decreased coordination, pain. Pt would benefit from continued OT skilled services 4x/wk to improve daily living skills to optimize QOL. Pt is recommended to discharge to Rehab at this time.        Rehab Prognosis:  Good; patient would benefit from acute skilled OT services to address these deficits and reach maximum level of function.       Plan:     Patient to be seen 4 x/week to address the above listed problems via self-care/home management, therapeutic activities, therapeutic exercises  · Plan of Care Expires: 06/05/19  · Plan of Care Reviewed with: patient    Subjective     Pain/Comfort:  · Pain Rating 1: 0/10    Objective:     Communicated with: RN prior to session.  Patient found HOB elevated with correa catheter, PEG Tube, oxygen, tracheostomy,  telemetry, pulse ox (continuous) upon OT entry to room.    General Precautions: Standard, fall   Orthopedic Precautions:N/A   Braces: LSO     Occupational Performance:     Bed Mobility:    · Patient completed Rolling/Turning to Right with stand by assistance   ·   Activities of Daily Living:  · Grooming: SETUP yanker for suctioning x3 times during session      [facility] 6 Click ADL: 14    Treatment & Education:  Pt educated on role of occupational therapy, POC, and safety during ADLs and functional mobility. Pt and OT discussed importance of safe, continued mobility to optimize daily living skills. Pt verbalized understanding. OT and pt discussed spinal precautions, donning/doffing schedule of LSO, importance of wearing LSO.   Pt completed the following during session: bed mobility, education, and the following UE ROM exercises to improve daily abilities, 1x10: shoulder flexion/ext, elbow flex/ext, forearm pronation/supination, wrist flex/ext, finger flex/ext. White board updated during session. Pt given instruction to call for medical staff/nurse for assistance.       Patient left HOB elevated with all lines intact, call button in reach and nurse  notifiedEducation:      GOALS:   Multidisciplinary Problems     Occupational Therapy Goals        Problem: Occupational Therapy Goal    Goal Priority Disciplines Outcome Interventions   Occupational Therapy Goal     OT, PT/OT Ongoing (interventions implemented as appropriate)    Description:  Goals to be met by: 6/5/19     Patient will increase functional independence with ADLs by performing:    UE Dressing with Set-up Assistance.  LE Dressing with Maximum Assistance and Assistive Devices as needed.  Grooming while standing with Minimal Assistance.  Toileting from bedside commode with Minimal Assistance for hygiene and clothing management.   Sitting at edge of bed x10 minutes with Supervision.  Rolling to Bilateral with Minimal Assistance.   Supine to sit with Minimal  Assistance.  Toilet transfer to bedside commode with Minimal Assistance.                       Time Tracking:     OT Date of Treatment: 06/03/19  OT Start Time: 1018  OT Stop Time: 1031  OT Total Time (min): 13 min    Billable Minutes:Therapeutic Activity 13 min    Ayesha Myers OT  6/3/2019

## 2019-06-03 NOTE — PROGRESS NOTES
[facility]  Infectious Disease  Progress Note    Patient Name: Mariann Huff  MRN: [id number]  Admission Date: 4/20/2019  Length of Stay: 44 days  Attending Physician: Robin Boyd MD  Primary Care Provider: Jasbir Haney MD    Isolation Status: No active isolations  Assessment/Plan:      Sepsis     60 y/o female well known to ID with HTN, DMII, CAD, NSTEMI, s/p L5-S1 OLIF on 4/12 complicated by intraoperative left ureteral injury s/p ureteroureteral anastomoses and ureteral stent placement who initially presented on 4/20 with fevers, AMS and intraabdominal abscess, s/p washout and ligation of left ureter with nephrostomy tube placement on 4/21.  She  has had a long and complicated hospital course (See HPI for chronology).  She is currently on long term antibiotic therapy for E coli and Staph lugdenesis infection with plan for subsequent suppressive therapy given concern for hardware involvement (end date 6/18/19).  Most recently followed by ID on 5/23, completed 7 day course of cefepime for HAP (pseudomonas) on 5/25 and 7 day course of high dose fluconazole for candida glabrata UTI.   ID is now reconsulted for recurrent fever.      Blood cultures 6/1 - NGTD   CXR 6/2 - no changes noted.  Prominent interstitial markings, no consolidations.      Patient denies subjective fevers, chills, sweats.  Denies HA.  + cough, no SOB.  + nausea, no abdominal pain.  No diarrhea.  Abdominal exam benign.  No significant skin breakdown. No calf pain.  PICC (placed 5/24), PEG and trach sites clear.  Correa placed 5/29.  Discussed need for correa with Urology, but apparently needed for persistent urinary retention when foleys removed.      No obvious source of fever.  U/A trace leukocytes.  Blood cxs NGTD.    Plan  - Continue current IV cefazolin and Rifampin as previously outlined.  Estimated end date 6/18/19.    - No obvious source of infection.  Pt no longer w/ F and no complaints.  - ESR, CRP, CBC, CMP, and Vanc  trough need to be drawn weekly as an outpatient with results faxed to the ID Department [phone]  -While on ancef/RIF needs on Mondays weekly ESR, CRP, CBC, CMP faxed to ID dept at [phone]   - if going to LTAC then consult ID to follow.  - patient can fu in ID clinic 14 d from DC or after DC from LTAC  - Will sign off.  Please reconsult w/ any new infectious concerns.    Patient seen by, and plan discussed with, ID staff  Discussed with Primary Team                 Thank you for your consult. I will sign off. Please contact us if you have any additional questions.    Tomasz Betancourt PA-C  Infectious Disease  [facility]    Subjective:     Principal Problem:Ureteral transection of left native kidney    HPI:  Ms. Huff is a 60 y/o female with HTN, DMII, CAD, NSTEMI, s/p L5-S1 OLIF with NSGY 4/12, complicated by a left ureter transection, repaired with ureteroureteral anastomoses and ureteral stent placement. She was discharged with a correa and MADHURI drain that was removed on 4/16. She was seen by urology and anticipated stent removal in 2-3 months (6/2019).  She presented to ED on 4/20 with AMS and sepsis. She was febrile 103 in ED. WBC 5K on admit. Lactate 4.6. Cath UA with 3+leukocytes, >100 WBcs and many bacteria.     MRI showed postsurgical change of L5-S1 posterior spinal fusion and anterior interbody fusion and a 4.4 cm fluid fluid collection in the left anterior prevertebral soft tissues at the level of the L5-S1 disc space - urinoma was not excluded.  A CT showed air collection within the anterior paraspinous soft tissues through which the left ureter courses. Given that the ureter courses through this, communication of the ureter with this collection is not excluded.  Air also noted in the left renal collecting system, along the left ureter and within the urinary bladder.   This was not amendable for drainage by IR so she was taken to OR for washout and she underwent ex-lap  of left ureter and left nephrostomy tube placement 4/21/19.  Per operative note, there were pockets of purulence noted and evacuated, sent for culture of left retroperitoneum to pole of left kidney. The stent was visible. Posterior to the stent there was a pocket of purulent fluid and exposed hardware along the spinal vertebrae. The tissue along the distal and proximal end of ureter were friable and unhealthy. She underwent left nephrostomy tube placement. The stent was removed and several metal clips were placed on proximal end of the ureteral. MADHURI drain was placed into left retroperitoneal.     Blood cultures were positive for E coli.  Urine cultures +E.coli and OR cultures were + for Staph lugdenensis (pan sensitive).   ID was consulted at that time (see note of 4/22).  There was concern for hardware involvement and she was ultimately treated with ceftriaxone and rifampin with plan for 6 weeks of IV antibiotics (end date 6/4/19) with plan for oral suppressive therapy thereafter.        ID was reconsulted on 4/29/16 for fevers and leukocytosis.   Patient was broadened to Vanc, CTX and Rifampin, and later vanc was stopped.  Source of fevers and leukocytosis were unclear, though abdominal source was suspected.  Blood cultures were negative, CT abd showed a superficial fluid collection, C diff was negative, lines were changed, BAL was negative. She was noted to have a partially occluded thrombus in the RIJ and proximal subclavian.  Drug reaction was considered.  Patient improved and leukocytosis and fever resolved.    ID signed off with plans to complete Cefazolin and rifampin for 6 weeks through 6/4.    ID was again re-consulted on 5/20 for recurrent fever and leukocytosis.  Abdominal drain noted to be draining pus, she had some rectal bleeding, and CXR showed new Left basilar consolidation concerning for HCAP.  Endotracheal aspirate showed Pseudomonas. Blood cultures negative. Urine showed candida glabrata.   Antibiotics were broadened to Vancomycin, cefepime, rifampin and fluconazole and lines were exchanged.  A repeat CT abd showed persistent subcutaneous fluid collection noted to be larger, but decreased fat stranding.  She was treated with 7 day course of Cefepime for HAP, a 7 day course of high dose fluconazole for candida glabrata and then transitioned back to cefazolin plus rifampin for prior E Coli and Staph lugdenensis and duration was extended to 8 week with end date of 6/18.     ID is now reconsulted for recurrent fever to 101.1.  WBC 12.91      Interval History: Pt without complaints.  WBC downtrending.  T max 100. UA trace leukocytes.     Review of Systems   Constitutional: Negative for activity change, chills, diaphoresis and fever.   HENT: Negative for congestion and sore throat.    Eyes: Negative.    Respiratory: Positive for cough. Negative for shortness of breath and wheezing.         Trach   Cardiovascular: Negative for chest pain, palpitations and leg swelling.   Gastrointestinal: Positive for nausea. Negative for abdominal pain, diarrhea and vomiting.        Tube feeding     Genitourinary:        Fontenot  Nephrostomy tube     Musculoskeletal: Negative for arthralgias, back pain and myalgias.   Skin: Negative for rash and wound.   Neurological: Negative for dizziness and headaches.   Psychiatric/Behavioral: Negative for agitation and behavioral problems. The patient is not nervous/anxious.      Objective:     Vital Signs (Most Recent):  Temp: 97.4 °F (36.3 °C) (06/03/19 1207)  Pulse: 107 (06/03/19 1207)  Resp: 18 (06/03/19 1207)  BP: (!) 144/70 (06/03/19 1207)  SpO2: 100 % (06/03/19 1207) Vital Signs (24h Range):  Temp:  [97.4 °F (36.3 °C)-100 °F (37.8 °C)] 97.4 °F (36.3 °C)  Pulse:  [100-112] 107  Resp:  [16-20] 18  SpO2:  [93 %-100 %] 100 %  BP: (137-162)/(68-79) 144/70     Weight: 71 kg (156 lb 8.4 oz)  Body mass index is 26.05 kg/m².    Estimated Creatinine Clearance: 75.8 mL/min (based on SCr of  0.8 mg/dL).    Physical Exam   Constitutional: She is oriented to person, place, and time. She appears well-developed and well-nourished. No distress.   HENT:   Head: Normocephalic and atraumatic.   Tracheostomy in place - no drainage/erythema noted   Eyes: Conjunctivae are normal. Right eye exhibits no discharge. Left eye exhibits no discharge. No scleral icterus.   Neck: Normal range of motion.   Cardiovascular: Regular rhythm. Tachycardia present.   Pulmonary/Chest: Effort normal. No respiratory distress. She has no wheezes. She has no rales.   Abdominal: Soft. Bowel sounds are normal. She exhibits no distension. There is no tenderness. There is no guarding.   Midline surgical  incision c/d/i.    PEG site clear   Genitourinary:   Genitourinary Comments: Nephrostomy tube in place with yellow output. Site clear. No tenderness.     Fontenot to gravity -  clear yellow urine   Musculoskeletal: Normal range of motion. She exhibits no edema or tenderness (No calf tenderness, swelling, erythema).   Lymphadenopathy:     She has no cervical adenopathy.   Neurological: She is alert and oriented to person, place, and time.   Skin: Skin is warm and dry. No rash noted. She is not diaphoretic.   Small skin tear left arm- no signs of infection  No skin breakdown noted to feet   PICC RUE - site clear   Psychiatric: She has a normal mood and affect. Her behavior is normal.   Nursing note and vitals reviewed.      Significant Labs:   Blood Culture:   Recent Labs   Lab 05/19/19  1843 05/20/19  1300 05/20/19  1306 06/01/19  1712 06/01/19  1713   LABBLOO No growth after 5 days. No growth after 5 days. No growth after 5 days. No Growth to date  No Growth to date No Growth to date  No Growth to date     BMP:   Recent Labs   Lab 06/02/19  0427   *   *   K 3.3*      CO2 30*   BUN 25*   CREATININE 0.8   CALCIUM 9.5     CBC:   Recent Labs   Lab 06/01/19  1712 06/02/19 0427   WBC 14.70* 12.91*   HGB 9.5* 8.4*   HCT 30.4*  26.8*    256     CMP:   Recent Labs   Lab 06/02/19  0427   *   K 3.3*      CO2 30*   *   BUN 25*   CREATININE 0.8   CALCIUM 9.5   ANIONGAP 8   EGFRNONAA >60.0     Microbiology Results (last 7 days)     Procedure Component Value Units Date/Time    Blood culture [[id number]] Collected:  06/01/19 1713    Order Status:  Completed Specimen:  Blood Updated:  06/02/19 2012     Blood Culture, Routine No Growth to date     Blood Culture, Routine No Growth to date    Blood culture [[id number]] Collected:  06/01/19 1712    Order Status:  Completed Specimen:  Blood Updated:  06/02/19 2012     Blood Culture, Routine No Growth to date     Blood Culture, Routine No Growth to date        Wound Culture:   Recent Labs   Lab 04/21/19  0125 05/19/19  1241   LABAERO STAPHYLOCOCCUS LUGDUNENSIS  Rare   No growth     All pertinent labs within the past 24 hours have been reviewed.    Significant Imaging: I have reviewed all pertinent imaging results/findings within the past 24 hours.

## 2019-06-03 NOTE — SUBJECTIVE & OBJECTIVE
Interval History 6/3/2019:  Patient is seen for follow-up rehab evaluation and recommendations: Participating with therapy.    HPI, Past Medical, Family, and Social History remains the same as documented in the initial encounter.    Scheduled Medications:    amLODIPine  10 mg Per G Tube Daily    ceFAZolin (ANCEF) IVPB  2 g Intravenous Q8H    chlorhexidine  15 mL Mouth/Throat BID    insulin aspart U-100  4 Units Subcutaneous Q24H    insulin aspart U-100  4 Units Subcutaneous Q24H    insulin aspart U-100  4 Units Subcutaneous Q24H    insulin aspart U-100  4 Units Subcutaneous Q24H    insulin aspart U-100  4 Units Subcutaneous Q24H    insulin aspart U-100  4 Units Subcutaneous Q24H    miconazole nitrate 2%   Topical (Top) BID    pantoprazole  40 mg Per G Tube Daily    rifAMpin (RIFADIN) IVPB  300 mg Intravenous Q12H    scopolamine  1 patch Transdermal Q3 Days       Diagnostic Results: Labs: Reviewed    PRN Medications: sodium chloride, acetaminophen, albuterol-ipratropium, Dextrose 10% Bolus, glucagon (human recombinant), hydrALAZINE, HYDROmorphone, hydrOXYzine, insulin aspart U-100, labetalol, magnesium sulfate IVPB, magnesium sulfate IVPB, ondansetron, oxyCODONE, oxyCODONE, promethazine (PHENERGAN) IVPB, sodium chloride 0.9%    Review of Systems   Reason unable to perform ROS: limited 2/2 trach.   Constitutional: Positive for fever. Negative for chills and fatigue.   HENT: Positive for trouble swallowing.    Respiratory: Negative for shortness of breath.    Cardiovascular: Negative for chest pain.   Genitourinary: Negative for dysuria.   Musculoskeletal: Positive for gait problem.   Neurological: Positive for weakness.     Objective:     Vital Signs (Most Recent):  Temp: 100 °F (37.8 °C) (06/03/19 0758)  Pulse: 105 (06/03/19 0758)  Resp: 16 (06/03/19 0758)  BP: (!) 162/79 (06/03/19 0758)  SpO2: 95 % (06/03/19 0758)    Vital Signs (24h Range):  Temp:  [98.7 °F (37.1 °C)-100 °F (37.8 °C)] 100 °F (37.8  °C)  Pulse:  [100-112] 105  Resp:  [16-20] 16  SpO2:  [93 %-100 %] 95 %  BP: (137-162)/(68-86) 162/79     Physical Exam   Constitutional: She appears well-developed and well-nourished.   HENT:   Head: Normocephalic and atraumatic.   Eyes: Right eye exhibits no discharge. Left eye exhibits no discharge.   Neck: Neck supple.   Cardiovascular: Intact distal pulses.   Pulmonary/Chest: Effort normal. No respiratory distress.   On trach collar     Abdominal: Soft. There is no tenderness.   PEG in place  neph tube in place    Musculoskeletal: She exhibits no edema or deformity.   Generalize deconditioning    Neurological: She is alert. No sensory deficit. She exhibits normal muscle tone.   Follows commands      Skin: Skin is warm and dry.   Psychiatric: She has a normal mood and affect. Her behavior is normal.   Vitals reviewed.

## 2019-06-03 NOTE — PLAN OF CARE
Problem: Adult Inpatient Plan of Care  Goal: Plan of Care Review  Outcome: Ongoing (interventions implemented as appropriate)  Plan of care review with patients. VSS TC 5l at 28% with TC.Temp was elevated to 100.0 recheck and was with in normal limits. Worked with PT, OT, and Speech today. Trach care was performed, Shiley was change from  #8 to number#6 Fontenot bag was change with aseptic maintained. Fontenot care performed. Nephrostomy tube flushed. Family at beside. . Bed in lowest position call light in reach, wheels lock, denies any needs at this time. TM

## 2019-06-03 NOTE — PROGRESS NOTES
[facility]  Physical Medicine & Rehab  Progress Note    Patient Name: Mariann Huff  MRN: [id number]  Admission Date: 4/20/2019  Length of Stay: 44 days  Attending Physician: Robin Boyd MD    Subjective:     Principal Problem:Ureteral transection of left native kidney    Hospital Course:   5/29/19: Bed mobility ModA.  Sit to stand ModA & RW x 4 trials.  Ambulated  4 sidesteps x 3 trials then 2 steps fwd/bwd ModA & RW.    05/30/2019: Bed mobility Min-ModA.  Sit to stand Inna and transfers MaxA.  Ambulated 12 ft x 3 trials ModA & RW.  UBD and LBD MaxA.  05/31/2019: Bed mobility Inna .  Sit to stand Inna-ModA & RW.  Ambulated 12 ft x2 trials ModA.  UBD and LBD ModA. Grooming setupA.    Interval History 6/3/2019:  Patient is seen for follow-up rehab evaluation and recommendations: Participating with therapy.    HPI, Past Medical, Family, and Social History remains the same as documented in the initial encounter.    Scheduled Medications:    amLODIPine  10 mg Per G Tube Daily    ceFAZolin (ANCEF) IVPB  2 g Intravenous Q8H    chlorhexidine  15 mL Mouth/Throat BID    insulin aspart U-100  4 Units Subcutaneous Q24H    insulin aspart U-100  4 Units Subcutaneous Q24H    insulin aspart U-100  4 Units Subcutaneous Q24H    insulin aspart U-100  4 Units Subcutaneous Q24H    insulin aspart U-100  4 Units Subcutaneous Q24H    insulin aspart U-100  4 Units Subcutaneous Q24H    miconazole nitrate 2%   Topical (Top) BID    pantoprazole  40 mg Per G Tube Daily    rifAMpin (RIFADIN) IVPB  300 mg Intravenous Q12H    scopolamine  1 patch Transdermal Q3 Days       Diagnostic Results: Labs: Reviewed    PRN Medications: sodium chloride, acetaminophen, albuterol-ipratropium, Dextrose 10% Bolus, glucagon (human recombinant), hydrALAZINE, HYDROmorphone, hydrOXYzine, insulin aspart U-100, labetalol, magnesium sulfate IVPB, magnesium sulfate IVPB, ondansetron, oxyCODONE, oxyCODONE, promethazine (PHENERGAN)  IVPB, sodium chloride 0.9%    Review of Systems   Reason unable to perform ROS: limited 2/2 trach.   Constitutional: Positive for fever. Negative for chills and fatigue.   HENT: Positive for trouble swallowing.    Respiratory: Negative for shortness of breath.    Cardiovascular: Negative for chest pain.   Genitourinary: Negative for dysuria.   Musculoskeletal: Positive for gait problem.   Neurological: Positive for weakness.     Objective:     Vital Signs (Most Recent):  Temp: 100 °F (37.8 °C) (06/03/19 0758)  Pulse: 105 (06/03/19 0758)  Resp: 16 (06/03/19 0758)  BP: (!) 162/79 (06/03/19 0758)  SpO2: 95 % (06/03/19 0758)    Vital Signs (24h Range):  Temp:  [98.7 °F (37.1 °C)-100 °F (37.8 °C)] 100 °F (37.8 °C)  Pulse:  [100-112] 105  Resp:  [16-20] 16  SpO2:  [93 %-100 %] 95 %  BP: (137-162)/(68-86) 162/79     Physical Exam   Constitutional: She appears well-developed and well-nourished.   HENT:   Head: Normocephalic and atraumatic.   Eyes: Right eye exhibits no discharge. Left eye exhibits no discharge.   Neck: Neck supple.   Cardiovascular: Intact distal pulses.   Pulmonary/Chest: Effort normal. No respiratory distress.   On trach collar  Abdominal: Soft. There is no tenderness.   PEG in place  neph tube in place    Musculoskeletal: She exhibits no edema or deformity.   Generalize deconditioning    Neurological: She is alert. No sensory deficit. She exhibits normal muscle tone.   Follows commands    Skin: Skin is warm and dry.   Psychiatric: She has a normal mood and affect. Her behavior is normal.   Vitals reviewed.  Assessment/Plan:      * Ureteral transection of left native kidney  -Urology consulted for intraabdominal abscess with fevers and AMS  -s/p left ureteral injury on 4/12, taken for washout and ligation of left ureter with neph tube placement on 4/21  -correa and nephr tube to remain in place per Urology       Impaired functional mobility and endurance  Recommendations  -  Encourage mobility, OOB in  chair at least 3 hours per day, and early ambulation as appropriate  -  PT/OT evaluate and treat  -  Pain management  -  Monitor for and prevent skin breakdown and pressure ulcers  · Early mobility, repositioning/weight shifting every 20-30 minutes when sitting, turn patient every 2 hours, proper mattress/overlay and chair cushioning, pressure relief/heel protector boots  -  DVT prophylaxis    -  Reviewed discharge options (IP rehab, SNF, HH therapy, and OP therapy)    Acute deep vein thrombosis (DVT) of femoral vein of right lower extremity  -AC held 2/2 recent GI bleed  -IVC filet placed 5/29  -Partially occlusive thrombosis of the right common femoral and proximal femoral veins. Thrombosed left anterior tibial vein on 5/29  -DVT in the bilateral upper extremities 5/12    Status post insertion of percutaneous endoscopic gastrostomy (PEG) tube  -on 5/2/19    BRBPR (bright red blood per rectum)  -s/p C-scope on 5/24 showed healing rectal ulcer and no active bleed    Sepsis  -ID consulted   -5/13 UCx growing Candida albicans on 5/13; s/p 7 day course of diflucan 800 mg  -5/19 BAL growing Pseudomonas, continue rifampin and Ancef until 6/18/19   - ID recommends reimaging patient should she continue to show signs of infection      Recommend Inpatient Rehab pending medical stability (MD requests trach to be downsized prior to transfer) per Dr. Sawant.         Liliane Hoffman NP  Department of Physical Medicine & Rehab   [facility]

## 2019-06-03 NOTE — ASSESSMENT & PLAN NOTE
-Urology consulted for intraabdominal abscess with fevers and AMS  -s/p left ureteral injury on 4/12, taken for washout and ligation of left ureter with neph tube placement on 4/21  -correa and nephr tube to remain in place per Urology

## 2019-06-03 NOTE — PLAN OF CARE
06/03/19 1455   Discharge Assessment   Assessment Type Discharge Planning Reassessment   Discharge Plan A Rehab   Discharge Plan B Home with family;Home Health   Patient/Family in Agreement with Plan other (see comments)  (Discused changing d/c plans with patient/ awaiting return call from spouse)     Pt stepped down from ICU to bed 1046. Met with patient to discuss changes in discharge planning. Discuss briefly with patient that Rashid has determined that she no longer meets the requirements for LTAC transfer because her care is not anticipated to exceed 3 weeks in LTAC due to progression in respiratory status with stable airway with trach collar. PT/OT are recommending Rehab facility. Briefly explained the goal is to work more with therapy to be able to tolerate IRF. She will need PT/OT/ST daily in a facility that can take trachs. Rashid has limited Rehab facilities in the area, discussed briefly the 4 different facilities. Feedjit packet given to patient and case mangers contact information placed on white board. Advised the nurse to contact the  when her spouse arrives so the  can explain the change in discharge plans. The patient was febrile this morning, ID still following. Pt has neph tube, correa, PICC, trach and PEG w/TF. Her trach was downsized today from 8 fr to 6 fr gisell, PMR is consulted and following. Will hold on submitting right care referrals to IRF at this time until  meets with the patients spouse to explain the update in discharge plans. Communicated with the patient the positive steps she is making with therapy to transition to IRF with the ultimate goal of returning home.

## 2019-06-04 LAB
ANION GAP SERPL CALC-SCNC: 7 MMOL/L (ref 8–16)
BASOPHILS # BLD AUTO: 0.06 K/UL (ref 0–0.2)
BASOPHILS NFR BLD: 0.5 % (ref 0–1.9)
BUN SERPL-MCNC: 38 MG/DL (ref 6–20)
CALCIUM SERPL-MCNC: 9.5 MG/DL (ref 8.7–10.5)
CHLORIDE SERPL-SCNC: 109 MMOL/L (ref 95–110)
CO2 SERPL-SCNC: 28 MMOL/L (ref 23–29)
CREAT SERPL-MCNC: 0.8 MG/DL (ref 0.5–1.4)
DIFFERENTIAL METHOD: ABNORMAL
EOSINOPHIL # BLD AUTO: 0.8 K/UL (ref 0–0.5)
EOSINOPHIL NFR BLD: 6.9 % (ref 0–8)
ERYTHROCYTE [DISTWIDTH] IN BLOOD BY AUTOMATED COUNT: 13.9 % (ref 11.5–14.5)
EST. GFR  (AFRICAN AMERICAN): >60 ML/MIN/1.73 M^2
EST. GFR  (NON AFRICAN AMERICAN): >60 ML/MIN/1.73 M^2
GLUCOSE SERPL-MCNC: 141 MG/DL (ref 70–110)
HCT VFR BLD AUTO: 27.4 % (ref 37–48.5)
HGB BLD-MCNC: 8.3 G/DL (ref 12–16)
IMM GRANULOCYTES # BLD AUTO: 0.04 K/UL (ref 0–0.04)
IMM GRANULOCYTES NFR BLD AUTO: 0.3 % (ref 0–0.5)
LYMPHOCYTES # BLD AUTO: 1.9 K/UL (ref 1–4.8)
LYMPHOCYTES NFR BLD: 16.8 % (ref 18–48)
MCH RBC QN AUTO: 28.6 PG (ref 27–31)
MCHC RBC AUTO-ENTMCNC: 30.3 G/DL (ref 32–36)
MCV RBC AUTO: 95 FL (ref 82–98)
MONOCYTES # BLD AUTO: 1.1 K/UL (ref 0.3–1)
MONOCYTES NFR BLD: 9.8 % (ref 4–15)
NEUTROPHILS # BLD AUTO: 7.6 K/UL (ref 1.8–7.7)
NEUTROPHILS NFR BLD: 65.7 % (ref 38–73)
NRBC BLD-RTO: 0 /100 WBC
PLATELET # BLD AUTO: 252 K/UL (ref 150–350)
PMV BLD AUTO: 11.2 FL (ref 9.2–12.9)
POCT GLUCOSE: 115 MG/DL (ref 70–110)
POCT GLUCOSE: 116 MG/DL (ref 70–110)
POCT GLUCOSE: 129 MG/DL (ref 70–110)
POCT GLUCOSE: 149 MG/DL (ref 70–110)
POCT GLUCOSE: 150 MG/DL (ref 70–110)
POCT GLUCOSE: 157 MG/DL (ref 70–110)
POTASSIUM SERPL-SCNC: 3.6 MMOL/L (ref 3.5–5.1)
RBC # BLD AUTO: 2.9 M/UL (ref 4–5.4)
SODIUM SERPL-SCNC: 144 MMOL/L (ref 136–145)
WBC # BLD AUTO: 11.52 K/UL (ref 3.9–12.7)

## 2019-06-04 PROCEDURE — 25000003 PHARM REV CODE 250: Performed by: STUDENT IN AN ORGANIZED HEALTH CARE EDUCATION/TRAINING PROGRAM

## 2019-06-04 PROCEDURE — 63600175 PHARM REV CODE 636 W HCPCS: Mod: JG | Performed by: STUDENT IN AN ORGANIZED HEALTH CARE EDUCATION/TRAINING PROGRAM

## 2019-06-04 PROCEDURE — 85025 COMPLETE CBC W/AUTO DIFF WBC: CPT

## 2019-06-04 PROCEDURE — 99232 PR SUBSEQUENT HOSPITAL CARE,LEVL II: ICD-10-PCS | Mod: ,,, | Performed by: NURSE PRACTITIONER

## 2019-06-04 PROCEDURE — 63600175 PHARM REV CODE 636 W HCPCS: Performed by: STUDENT IN AN ORGANIZED HEALTH CARE EDUCATION/TRAINING PROGRAM

## 2019-06-04 PROCEDURE — 36415 COLL VENOUS BLD VENIPUNCTURE: CPT

## 2019-06-04 PROCEDURE — 80048 BASIC METABOLIC PNL TOTAL CA: CPT

## 2019-06-04 PROCEDURE — 99900035 HC TECH TIME PER 15 MIN (STAT)

## 2019-06-04 PROCEDURE — 99232 SBSQ HOSP IP/OBS MODERATE 35: CPT | Mod: ,,, | Performed by: NURSE PRACTITIONER

## 2019-06-04 PROCEDURE — 92609 USE OF SPEECH DEVICE SERVICE: CPT

## 2019-06-04 PROCEDURE — 20600001 HC STEP DOWN PRIVATE ROOM

## 2019-06-04 PROCEDURE — 94761 N-INVAS EAR/PLS OXIMETRY MLT: CPT

## 2019-06-04 PROCEDURE — 27000221 HC OXYGEN, UP TO 24 HOURS

## 2019-06-04 RX ADMIN — RIFAMPIN 300 MG: 600 INJECTION, POWDER, LYOPHILIZED, FOR SOLUTION INTRAVENOUS at 03:06

## 2019-06-04 RX ADMIN — HYDROMORPHONE HYDROCHLORIDE 1 MG: 1 INJECTION, SOLUTION INTRAMUSCULAR; INTRAVENOUS; SUBCUTANEOUS at 08:06

## 2019-06-04 RX ADMIN — MICONAZOLE NITRATE: 20 OINTMENT TOPICAL at 10:06

## 2019-06-04 RX ADMIN — AMLODIPINE BESYLATE 10 MG: 10 TABLET ORAL at 10:06

## 2019-06-04 RX ADMIN — PANTOPRAZOLE SODIUM 40 MG: 40 GRANULE, DELAYED RELEASE ORAL at 10:06

## 2019-06-04 RX ADMIN — INSULIN ASPART 6 UNITS: 100 INJECTION, SOLUTION INTRAVENOUS; SUBCUTANEOUS at 01:06

## 2019-06-04 RX ADMIN — HYDROMORPHONE HYDROCHLORIDE 1 MG: 1 INJECTION, SOLUTION INTRAMUSCULAR; INTRAVENOUS; SUBCUTANEOUS at 01:06

## 2019-06-04 RX ADMIN — OXYCODONE HYDROCHLORIDE 10 MG: 10 TABLET ORAL at 10:06

## 2019-06-04 RX ADMIN — CHLORHEXIDINE GLUCONATE 0.12% ORAL RINSE 15 ML: 1.2 LIQUID ORAL at 09:06

## 2019-06-04 RX ADMIN — INSULIN ASPART 6 UNITS: 100 INJECTION, SOLUTION INTRAVENOUS; SUBCUTANEOUS at 04:06

## 2019-06-04 RX ADMIN — INSULIN ASPART 1 UNITS: 100 INJECTION, SOLUTION INTRAVENOUS; SUBCUTANEOUS at 01:06

## 2019-06-04 RX ADMIN — INSULIN ASPART 6 UNITS: 100 INJECTION, SOLUTION INTRAVENOUS; SUBCUTANEOUS at 05:06

## 2019-06-04 RX ADMIN — ONDANSETRON 4 MG: 2 INJECTION INTRAMUSCULAR; INTRAVENOUS at 11:06

## 2019-06-04 RX ADMIN — INSULIN ASPART 6 UNITS: 100 INJECTION, SOLUTION INTRAVENOUS; SUBCUTANEOUS at 08:06

## 2019-06-04 RX ADMIN — CEFAZOLIN 2 G: 1 INJECTION, POWDER, FOR SOLUTION INTRAMUSCULAR; INTRAVENOUS at 06:06

## 2019-06-04 RX ADMIN — MICONAZOLE NITRATE: 20 OINTMENT TOPICAL at 09:06

## 2019-06-04 RX ADMIN — CEFAZOLIN 2 G: 1 INJECTION, POWDER, FOR SOLUTION INTRAMUSCULAR; INTRAVENOUS at 09:06

## 2019-06-04 RX ADMIN — SCOPALAMINE 1 PATCH: 1 PATCH, EXTENDED RELEASE TRANSDERMAL at 09:06

## 2019-06-04 RX ADMIN — OXYCODONE HYDROCHLORIDE 10 MG: 10 TABLET ORAL at 05:06

## 2019-06-04 RX ADMIN — CHLORHEXIDINE GLUCONATE 0.12% ORAL RINSE 15 ML: 1.2 LIQUID ORAL at 10:06

## 2019-06-04 RX ADMIN — CEFAZOLIN 2 G: 1 INJECTION, POWDER, FOR SOLUTION INTRAMUSCULAR; INTRAVENOUS at 03:06

## 2019-06-04 RX ADMIN — ALTEPLASE 2 MG: 2.2 INJECTION, POWDER, LYOPHILIZED, FOR SOLUTION INTRAVENOUS at 12:06

## 2019-06-04 RX ADMIN — ONDANSETRON 4 MG: 2 INJECTION INTRAMUSCULAR; INTRAVENOUS at 09:06

## 2019-06-04 NOTE — PLAN OF CARE
06/04/19 1636   Post-Acute Status   Post-Acute Authorization Placement   Post-Acute Placement Status Authorization Obtained     Pt has been accepted to [facility] to transfer tomorrow.  Pt switched insurances over the weekend and now has Medicare.  CM has updated the pt and pt's . SW will f/u tomorrow.    Ирина Godinez, [facility] x [phone]

## 2019-06-04 NOTE — SUBJECTIVE & OBJECTIVE
Interval History: No acute events. Trach downsized from #8 to #6 yesterday. Afebrile. Continued mild tachycardia. UOP adequate. Cr stable.     Review of Systems  Objective:     Temp:  [97.4 °F (36.3 °C)-100 °F (37.8 °C)] 98.4 °F (36.9 °C)  Pulse:  [] 99  Resp:  [16-18] 16  SpO2:  [94 %-100 %] 99 %  BP: (135-162)/(66-79) 149/72     Body mass index is 26.05 kg/m².      Bladder Scan Volume (mL): 27 mL (05/10/19 0846)  Post Void Cath Residual Output (mL): 27 mL (05/10/19 0846)    Drains     Drain                 Nephrostomy 04/21/19 0629 Left 8 Fr. 44 days         Gastrostomy/Enterostomy 05/02/19 1134 Percutaneous endoscopic gastrostomy (PEG) LUQ decompression;feeding 32 days         Urethral Catheter 05/29/19 0151 6 days                Physical Exam   Constitutional: She appears well-developed. No distress.   HENT:   Head: Normocephalic and atraumatic.   Neck: No JVD present.   Cardiovascular: Normal rate and regular rhythm.    Pulmonary/Chest: Effort normal. No respiratory distress.   Trach collar   Abdominal: Soft. She exhibits no distension. There is no rebound and no guarding.   Incision c/d/i   G-tube   Genitourinary:   Genitourinary Comments: Fontenot in place draining clear yellow urine  Left neph tube with clear yellow urine   Neurological: She is alert.   Skin: Skin is warm and dry. She is not diaphoretic. No pallor.         Significant Labs:    BMP:  Recent Labs   Lab 05/31/19  0535 06/02/19  0427 06/04/19  0451    146* 144   K 3.8 3.3* 3.6    108 109   CO2 27 30* 28   BUN 21* 25* 38*   CREATININE 0.8 0.8 0.8   CALCIUM 9.5 9.5 9.5       CBC:   Recent Labs   Lab 06/01/19  1712 06/02/19  0427 06/04/19  0451   WBC 14.70* 12.91* 11.52   HGB 9.5* 8.4* 8.3*   HCT 30.4* 26.8* 27.4*    375 927     Significant Imaging:  All pertinent imaging results/findings from the past 24 hours have been reviewed.

## 2019-06-04 NOTE — NURSING
Patient's PICC line flushing but unable to draw back. Notified MD.  [facility]

## 2019-06-04 NOTE — PLAN OF CARE
Problem: Adult Inpatient Plan of Care  Goal: Plan of Care Review  Outcome: Ongoing (interventions implemented as appropriate)  AAO x 4. R and L back incisions with steri strips, CDI. L nephrostomy. Pt has peg tube feedings going at 45 mL/hr, tolerating well with no complaints of N/V overnight. NPO. Pt has correa draining yellow urine. x2 assist with walker. Has #6 Shiley trach 5L O2 at 28%. On tele running sinus tach. Accuchecks q4hr, had to be covered overnight. No complaints of pain overnight. Pt remained free of falls overnight. POC reviewed with pt who verbalized understanding. Bed in low position, call light in reach. No signs of distress or complaints noted at this time. WCTM.

## 2019-06-04 NOTE — SUBJECTIVE & OBJECTIVE
Interval History 6/4/2019:  Patient is seen for follow-up rehab evaluation and recommendations: Participating with therapy. Trach downsized yesterday.    HPI, Past Medical, Family, and Social History remains the same as documented in the initial encounter.    Scheduled Medications:    amLODIPine  10 mg Per G Tube Daily    ceFAZolin (ANCEF) IVPB  2 g Intravenous Q8H    chlorhexidine  15 mL Mouth/Throat BID    insulin aspart U-100  6 Units Subcutaneous Q24H    insulin aspart U-100  6 Units Subcutaneous Q24H    insulin aspart U-100  6 Units Subcutaneous Q24H    insulin aspart U-100  6 Units Subcutaneous Q24H    insulin aspart U-100  6 Units Subcutaneous Q24H    insulin aspart U-100  6 Units Subcutaneous Q24H    miconazole nitrate 2%   Topical (Top) BID    pantoprazole  40 mg Per G Tube Daily    rifAMpin (RIFADIN) IVPB  300 mg Intravenous Q12H    scopolamine  1 patch Transdermal Q3 Days       Diagnostic Results: Labs: Reviewed    PRN Medications: sodium chloride, acetaminophen, albuterol-ipratropium, Dextrose 10% Bolus, glucagon (human recombinant), hydrALAZINE, HYDROmorphone, hydrOXYzine, insulin aspart U-100, labetalol, magnesium sulfate IVPB, magnesium sulfate IVPB, ondansetron, oxyCODONE, oxyCODONE, promethazine (PHENERGAN) IVPB, sodium chloride 0.9%    Review of Systems   Reason unable to perform ROS: limited 2/2 trach.   Constitutional: Positive for fever. Negative for chills and fatigue.   HENT: Positive for trouble swallowing and voice change (2/2 trach).         Increased oral secretions   Respiratory: Negative for shortness of breath.    Cardiovascular: Negative for chest pain.   Genitourinary: Negative for dysuria.   Musculoskeletal: Positive for gait problem.   Neurological: Positive for weakness.     Objective:     Vital Signs (Most Recent):  Temp: 98.4 °F (36.9 °C) (06/04/19 0904)  Pulse: 97 (06/04/19 0904)  Resp: 16 (06/04/19 0904)  BP: (!) 149/66 (06/04/19 0904)  SpO2: 99 % (06/04/19 0904)     Vital Signs (24h Range):  Temp:  [97.4 °F (36.3 °C)-99.8 °F (37.7 °C)] 98.4 °F (36.9 °C)  Pulse:  [] 97  Resp:  [16-18] 16  SpO2:  [94 %-100 %] 99 %  BP: (135-150)/(66-75) 149/66     Physical Exam   Constitutional: She appears well-developed and well-nourished.   HENT:   Head: Normocephalic and atraumatic.   Eyes: Right eye exhibits no discharge. Left eye exhibits no discharge.   Neck: Neck supple.   Cardiovascular: Intact distal pulses.   Pulmonary/Chest: Effort normal. No respiratory distress.   On trach collar     Abdominal: Soft. There is no tenderness.   PEG in place  neph tube in place    Musculoskeletal: She exhibits no edema or deformity.   Generalize deconditioning    Neurological: She is alert. No sensory deficit. She exhibits normal muscle tone.   Follows commands   Verbal on exam today    Skin: Skin is warm and dry.   Psychiatric: She has a normal mood and affect. Her behavior is normal. Cognition and memory are not impaired.   Vitals reviewed.

## 2019-06-04 NOTE — NURSING
Patient complained of nausea. Tube feeds at 45mL/hr paused. Zofran given. Notified Dr.Jacob Ferguson. MD stated to restart tube feeds if nausea subsides.

## 2019-06-04 NOTE — PROGRESS NOTES
[facility]  Urology  Progress Note    Patient Name: Mariann Huff  MRN: [id number]  Admission Date: 4/20/2019  Hospital Length of Stay: 45 days  Code Status: Full Code   Attending Provider: Robin Boyd MD   Primary Care Physician: Jasbir Haney MD    Subjective:     HPI:  Mariann Huff is a 59 y.o. female with history of HTN, type 2 diabetes mellitus, CAD, NSTEMI, and back pain who [place] [facility] [place] altered mental status and sepsis. She underwent L5-S1 OLIF with NSGY on 4/12 and had intraoperative left ureteral injury with ureteroureteral anastomosis and ureteral stent placement. She did well initially and had correa and MADHURI drain removed on 4/16. She began having chills and altered mental status 2 days ago and this has progressively worsened. No complaints of pain.     She is altered and HPI is limited. In the ED she is febrile to 103 and tachycardic and pressures are low normal. WBC is 5, creatinine is 3.6 baseline 1.0, lactate is 4.6. Cath UA concerning for infection, 3+ LE, >100 WBCs, and many bacteria on microscopy.    CT and MRI abdomen both show air with minimal fluid near the surgical site with left ureter coursing through. There is air throughout the proximal collecting system which is decompressed with JJ ureteral stent in good position.    Taken for ex lap, ligation of left ureter and left neph tube placement on 4/21/19.    Interval History: No acute events. Trach downsized from #8 to #6 yesterday. Afebrile. Continued mild tachycardia. UOP adequate. Cr stable.     Review of Systems  Objective:     Temp:  [97.4 °F (36.3 °C)-100 °F (37.8 °C)] 98.4 °F (36.9 °C)  Pulse:  [] 99  Resp:  [16-18] 16  SpO2:  [94 %-100 %] 99 %  BP: (135-162)/(66-79) 149/72     Body mass index is 26.05 kg/m².      Bladder Scan Volume (mL): 27 mL (05/10/19 0846)  Post Void Cath Residual Output (mL): 27 mL (05/10/19 0846)    Drains     Drain                 Nephrostomy 04/21/19 0629 Left 8 Fr. 44 days          Gastrostomy/Enterostomy 05/02/19 1134 Percutaneous endoscopic gastrostomy (PEG) LUQ decompression;feeding 32 days         Urethral Catheter 05/29/19 0151 6 days                Physical Exam   Constitutional: She appears well-developed. No distress.   HENT:   Head: Normocephalic and atraumatic.   Neck: No JVD present.   Cardiovascular: Normal rate and regular rhythm.    Pulmonary/Chest: Effort normal. No respiratory distress.   Trach collar   Abdominal: Soft. She exhibits no distension. There is no rebound and no guarding.   Incision c/d/i   G-tube   Genitourinary:   Genitourinary Comments: Fontenot in place draining clear yellow urine  Left neph tube with clear yellow urine   Neurological: She is alert.   Skin: Skin is warm and dry. She is not diaphoretic. No pallor.         Significant Labs:    BMP:  Recent Labs   Lab 05/31/19  0535 06/02/19  0427 06/04/19  0451    146* 144   K 3.8 3.3* 3.6    108 109   CO2 27 30* 28   BUN 21* 25* 38*   CREATININE 0.8 0.8 0.8   CALCIUM 9.5 9.5 9.5       CBC:   Recent Labs   Lab 06/01/19  1712 06/02/19  0427 06/04/19  0451   WBC 14.70* 12.91* 11.52   HGB 9.5* 8.4* 8.3*   HCT 30.4* 26.8* 27.4*    256 252     Significant Imaging:  All pertinent imaging results/findings from the past 24 hours have been reviewed.                  Assessment/Plan:     * Ureteral transection of left native kidney  Elizaamanda Huff is a 59 y.o. female s/p left ureteral injury on 4/12, presented with fever, AMS, and intraabdominal abscess, taken for washout and ligation of left ureter with neph tube placement on 4/21, Trach/PEG 5/2.  - on trach collar, Speech on board  - continue TF  - ID on board, appreciate recs:   - completed cefepime x 7 days, now on Ancef; continue rifampin and Ancef until 6/18/19   - 5/13 UCx growing Candida albicans; 7 day course of diflucan 800 mg now complete   - 5/19 BAL growing Pseudomonas   - 6/1 repeat blood cultures - NGTD   - ID on board, appreciate recs  -  Maintain neph tube and correa; patient failed VT on 5/28/19  - Urine output adequate  - bloody BMs - flexible sigmoidoscopy on 5/21/19 showed no signs of bleeding, Colonoscopy showed healing rectal ulcer, EGD not indicated per GI; H&H stable  - heparin for RUE DVT held due to GI bleeding, patient found on 5/29 to have RLE DVT; IVC filter placed 5/29  - labs every other day  - trach downsized to #6 on 6/3/19  - Speech to evaluate patient for speaking valve    Dispo: patient stable on trach collar, pending acceptance to rehab facility    Sepsis  As above        VTE Risk Mitigation (From admission, onward)        Ordered     IP VTE LOW RISK PATIENT  Once      05/08/19 1315     Place sequential compression device  Until discontinued      04/20/19 6960          Mook Ferguson MD  Urology  [facility]

## 2019-06-04 NOTE — CARE UPDATE
BG goal 140 - 180   BG is within or below goal on current SQ insulin regimen while receiving TF.      Continue Novolog to 6 units every 4 hours while on TF.   Moderate Dose SQ Insulin Correction Scale.  BG monitoring q 4 hrs.      ** Please notify Endocrine for any change and/or advance in diet/TF**  ** Please call Endocrine for any BG related issues **     Discharge Recommendations:     TBD.

## 2019-06-04 NOTE — ASSESSMENT & PLAN NOTE
Mariann Huff is a 59 y.o. female s/p left ureteral injury on 4/12, presented with fever, AMS, and intraabdominal abscess, taken for washout and ligation of left ureter with neph tube placement on 4/21, Trach/PEG 5/2.  - on trach collar, Speech on board  - continue TF  - ID on board, appreciate recs:   - completed cefepime x 7 days, now on Ancef; continue rifampin and Ancef until 6/18/19   - 5/13 UCx growing Candida albicans; 7 day course of diflucan 800 mg now complete   - 5/19 BAL growing Pseudomonas   - 6/1 repeat blood cultures - NGTD   - ID on board, appreciate recs  - Maintain neph tube and correa; patient failed VT on 5/28/19  - Urine output adequate  - bloody BMs - flexible sigmoidoscopy on 5/21/19 showed no signs of bleeding, Colonoscopy showed healing rectal ulcer, EGD not indicated per GI; H&H stable  - heparin for RUE DVT held due to GI bleeding, patient found on 5/29 to have RLE DVT; IVC filter placed 5/29  - labs every other day  - trach downsized to #6 on 6/3/19  - Speech to evaluate patient for speaking valve    Dispo: patient stable on trach collar, pending acceptance to rehab facility

## 2019-06-04 NOTE — PT/OT/SLP PROGRESS
Speech Language Pathology Treatment    Patient Name:  Mariann Huff   MRN:  [id number]  Admitting Diagnosis: Ureteral transection of left native kidney    Recommendations:          General Precautions: Standard, fall  Communication strategies:  One way speaking valve    Subjective     Awake/alert    Pain/Comfort:  · Pain Rating 1: 0/10  · Pain Rating Post-Intervention 1: 0/10    Objective:     Has the patient been evaluated by SLP for swallowing?   No  Keep patient NPO?     Current Respiratory Status: trach with cuff deflated      Passy Katina Speaking Valve  Trach size/type: Shiley 6 cuff deflated  Able to phonate around trach:no  Vocal quality with digital finger occlusion: hoarse, low intensity  O2 sats at rest:100%  O2 sats with PMSV in place: 100%  Vocal quality with valve in place:Increased intensity, voice remained hoarse. Pt called  on phone during session and tolerated the PMSV without discomfort.  Back pressure upon removal:none  Minutes PMSV worn:Valve tolerated throughout session and left on after session. NSG notified that PMSV left on.  Education topics addressed: cleaning valve, one-way technology, anatomy of trach, precautions with cuffed trach, removal of valve prior to sleeping      Assessment:     Mariann Huff is a 59 y.o. female with an SLP diagnosis of One Way Speaking Valve Training.      Goals:   Multidisciplinary Problems     SLP Goals        Problem: SLP Goal    Goal Priority Disciplines Outcome   SLP Goal     SLP    Description:  Speech Language Pathology Goals  Goals expected to be met by 6/8    1. Pt will tolerate PMSV for 5 min with >92% spO2 to increase phonation, respiration and swallowing aspects  2. Pt/family will don/doff PMSV x1 during session with min cues  3. Pt will vocalize with  PMSV in place to increase verbal expression.                          Plan:     · Patient to be seen:  3 x/week   · Plan of Care reviewed with:  patient   · SLP Follow-Up:  Yes       Discharge  recommendations:  rehabilitation facility       Time Tracking:     SLP Treatment Date:   06/04/19  Speech Start Time:  0839  Speech Stop Time:  0848     Speech Total Time (min):  9 min    Billable Minutes: Therapeutic Speech Device 9    Jerilyn Smith CCC-BRY  06/04/2019

## 2019-06-04 NOTE — PROGRESS NOTES
"[facility]  Adult Nutrition  Progress Note    SUMMARY       Recommendations    Recommendation/Intervention:     Current TF providing < 85% EEN. Recommend modifying to Glucerna 1.5 at a goal rate of 45 mL/hr - to provide 1620 kcal/day, 89g protein/day, and 820mL free fluid/day.     RD to monitor.     Goals: Patient to receive nutrition by RD follow-up  Nutrition Goal Status: goal met  Communication of RD Recs: (POC)    Reason for Assessment    Reason For Assessment: RD follow-up  Diagnosis: surgery/postoperative complications(ureteral transection of L kidney)  Relevant Medical History: CAD, DM2, HTN, HLD  General Information Comments: Trach collar, TF running. Pt reports tolerating TF at 45 mL/hr with no N/V. C/o diarrhea. Noted with ~10 lbs wt loss since last RD visit, but now at admit wt. Pt remains nourished. Pending acceptance to rehab facility.   Nutrition Discharge Planning: Unable to determine at this time.    Nutrition Risk Screen    Nutrition Risk Screen: tube feeding or parenteral nutrition    Nutrition/Diet History    Spiritual, Cultural Beliefs, Hindu Practices, Values that Affect Care: no  Factors Affecting Nutritional Intake: NPO    Anthropometrics    Temp: 98.4 °F (36.9 °C)  Height Method: Estimated  Height: 5' 5" (165.1 cm)  Height (inches): 65 in  Weight Method: Bed Scale  Weight: 71 kg (156 lb 8.4 oz)  Weight (lb): 156.53 lb  Ideal Body Weight (IBW), Female: 125 lb  % Ideal Body Weight, Female (lb): 146.38 lb  BMI (Calculated): 30.5  BMI Grade: 30 - 34.9- obesity - grade I     Lab/Procedures/Meds    Pertinent Labs Reviewed: reviewed  Pertinent Labs Comments: BUN 38, glucose 141  Pertinent Medications Reviewed: reviewed  Pertinent Medications Comments: amlodipine, insulin    Estimated/Assessed Needs    Weight Used For Calorie Calculations: 71.4 kg (157 lb 6.5 oz)(admit wt)  Energy Calorie Requirements (kcal): 1618 kcal/day  Energy Need Method: [place]-St Jeor(x 1.25)  Protein " Requirements:  g/day(1.2-1.4 g/kg)  Weight Used For Protein Calculations: 71.4 kg (157 lb 6.5 oz)(admit weight)  Fluid Requirements (mL): 1 mL/kcal or per MD  Estimated Fluid Requirement Method: RDA Method  RDA Method (mL): 1618     Nutrition Prescription Ordered    Current Diet Order: NPO  Current Nutrition Support Formula Ordered: Peptamen Intense VHP  Current Nutrition Support Rate Ordered: 45 (ml)  Current Nutrition Support Frequency Ordered: mL/hr    Evaluation of Received Nutrient/Fluid Intake    Enteral Calories (kcal): 1080  Enteral Protein (gm): 99  Enteral (Free Water) Fluid (mL): 907  % Kcal Needs: 67  % Protein Needs: 115  Energy Calories Required: not meeting needs  Protein Required: meeting needs  Fluid Required: (per MD)  Comments: LBM 6/3  Tolerance: tolerating  % Intake of Estimated Energy Needs: 50 - 75 %  % Meal Intake: NPO    Nutrition Risk    Level of Risk/Frequency of Follow-up: low(1x/week)     Assessment and Plan    Nutrition Problem  Inadequate energy intake     Related to (etiology):   Decreased ability to consume sufficient energy     Signs and Symptoms (as evidenced by):   NPO, TF providing < 85% EEN.     Interventions:  Collaboration and referral of nutrition care     Nutrition Diagnosis Status:   Continues     Monitor and Evaluation    Food and Nutrient Intake: energy intake, enteral nutrition intake  Food and Nutrient Adminstration: enteral and parenteral nutrition administration  Anthropometric Measurements: weight, weight change  Biochemical Data, Medical Tests and Procedures: electrolyte and renal panel, gastrointestinal profile, glucose/endocrine profile, inflammatory profile  Nutrition-Focused Physical Findings: overall appearance      Nutrition Follow-Up    RD Follow-up?: Yes

## 2019-06-04 NOTE — PROGRESS NOTES
[facility]  Physical Medicine & Rehab  Progress Note    Patient Name: Mariann Huff  MRN: [id number]  Admission Date: 4/20/2019  Length of Stay: 45 days  Attending Physician: Robin Boyd MD    Subjective:     Principal Problem:Ureteral transection of left native kidney    Hospital Course:   5/29/19: Bed mobility ModA.  Sit to stand ModA & RW x 4 trials.  Ambulated  4 sidesteps x 3 trials then 2 steps fwd/bwd ModA & RW.    05/30/2019: Bed mobility Min-ModA.  Sit to stand Sukhi and transfers MaxA.  Ambulated 12 ft x 3 trials ModA & RW.  UBD and LBD MaxA.  05/31/2019: Bed mobility Sukhi .  Sit to stand Sukhi-ModA & RW.  Ambulated 12 ft x2 trials ModA.  UBD and LBD ModA. Grooming setupA.  06/03/2019: Bed mobility SBA-ModA.  Sit to stand Sukhi-ModA & RW. Trxs ModA & RW. Ambulated 25 ft x 3 trials Sukhi & RW.  Grooming setupA.    Interval History 6/4/2019:  Patient is seen for follow-up rehab evaluation and recommendations: Participating with therapy. Trach downsized yesterday.    HPI, Past Medical, Family, and Social History remains the same as documented in the initial encounter.    Scheduled Medications:    amLODIPine  10 mg Per G Tube Daily    ceFAZolin (ANCEF) IVPB  2 g Intravenous Q8H    chlorhexidine  15 mL Mouth/Throat BID    insulin aspart U-100  6 Units Subcutaneous Q24H    insulin aspart U-100  6 Units Subcutaneous Q24H    insulin aspart U-100  6 Units Subcutaneous Q24H    insulin aspart U-100  6 Units Subcutaneous Q24H    insulin aspart U-100  6 Units Subcutaneous Q24H    insulin aspart U-100  6 Units Subcutaneous Q24H    miconazole nitrate 2%   Topical (Top) BID    pantoprazole  40 mg Per G Tube Daily    rifAMpin (RIFADIN) IVPB  300 mg Intravenous Q12H    scopolamine  1 patch Transdermal Q3 Days       Diagnostic Results: Labs: Reviewed    PRN Medications: sodium chloride, acetaminophen, albuterol-ipratropium, Dextrose 10% Bolus, glucagon (human recombinant), hydrALAZINE,  HYDROmorphone, hydrOXYzine, insulin aspart U-100, labetalol, magnesium sulfate IVPB, magnesium sulfate IVPB, ondansetron, oxyCODONE, oxyCODONE, promethazine (PHENERGAN) IVPB, sodium chloride 0.9%    Review of Systems   Reason unable to perform ROS: limited 2/2 trach.   Constitutional: Positive for fever. Negative for chills and fatigue.   HENT: Positive for trouble swallowing and voice change (2/2 trach).         Increased oral secretions   Respiratory: Negative for shortness of breath.    Cardiovascular: Negative for chest pain.   Genitourinary: Negative for dysuria.   Musculoskeletal: Positive for gait problem.   Neurological: Positive for weakness.     Objective:     Vital Signs (Most Recent):  Temp: 98.4 °F (36.9 °C) (06/04/19 0904)  Pulse: 97 (06/04/19 0904)  Resp: 16 (06/04/19 0904)  BP: (!) 149/66 (06/04/19 0904)  SpO2: 99 % (06/04/19 0904)    Vital Signs (24h Range):  Temp:  [97.4 °F (36.3 °C)-99.8 °F (37.7 °C)] 98.4 °F (36.9 °C)  Pulse:  [] 97  Resp:  [16-18] 16  SpO2:  [94 %-100 %] 99 %  BP: (135-150)/(66-75) 149/66     Physical Exam   Constitutional: She appears well-developed and well-nourished.   HENT:   Head: Normocephalic and atraumatic.   Eyes: Right eye exhibits no discharge. Left eye exhibits no discharge.   Neck: Neck supple.   Cardiovascular: Intact distal pulses.   Pulmonary/Chest: Effort normal. No respiratory distress.   On trach collar  Abdominal: Soft. There is no tenderness.   PEG in place  neph tube in place    Musculoskeletal: She exhibits no edema or deformity.   Generalize deconditioning    Neurological: She is alert. No sensory deficit. She exhibits normal muscle tone.   Follows commands   Verbal on exam today    : correa in place  Skin: Skin is warm and dry.   Psychiatric: She has a normal mood and affect. Her behavior is normal. Cognition and memory are not impaired.   Vitals reviewed.    Assessment/Plan:      * Ureteral transection of left native kidney  -Urology consulted  for intraabdominal abscess with fevers and AMS  -s/p left ureteral injury on 4/12, taken for washout and ligation of left ureter with neph tube placement on 4/21  -correa and nephr tube to remain in place per Urology       Impaired functional mobility and endurance  Recommendations  -  Encourage mobility, OOB in chair at least 3 hours per day, and early ambulation as appropriate  -  PT/OT evaluate and treat  -  Pain management  -  Monitor for and prevent skin breakdown and pressure ulcers  · Early mobility, repositioning/weight shifting every 20-30 minutes when sitting, turn patient every 2 hours, proper mattress/overlay and chair cushioning, pressure relief/heel protector boots  -  DVT prophylaxis    -  Reviewed discharge options (IP rehab, SNF, HH therapy, and OP therapy)    Acute deep vein thrombosis (DVT) of femoral vein of right lower extremity  -AC held 2/2 recent GI bleed  -s/p IVC filter 5/29     Status post insertion of percutaneous endoscopic gastrostomy (PEG) tube  -s/p  5/2/19    BRBPR (bright red blood per rectum)  -s/p C-scope on 5/24 showed healing rectal ulcer and no active bleed    Sepsis  -ID consulted   -5/13 UCx growing Candida albicans on 5/13; s/p 7 day course of diflucan 800 mg  -5/19 BAL growing Pseudomonas, continue rifampin and Ancef until 6/18/19   - ID recommends reimaging patient should she continue to show signs of infection    Continue with recommendation for Inpatient Rehab per .         Liliane Hoffman NP  Department of Physical Medicine & Rehab   [facility]

## 2019-06-04 NOTE — PLAN OF CARE
REFERRAL SENT TO     [facility] E-Referral   [facility] E-Referral

## 2019-06-05 VITALS
DIASTOLIC BLOOD PRESSURE: 63 MMHG | TEMPERATURE: 99 F | WEIGHT: 156.5 LBS | OXYGEN SATURATION: 96 % | RESPIRATION RATE: 17 BRPM | BODY MASS INDEX: 26.08 KG/M2 | HEART RATE: 103 BPM | HEIGHT: 65 IN | SYSTOLIC BLOOD PRESSURE: 136 MMHG

## 2019-06-05 LAB
POCT GLUCOSE: 112 MG/DL (ref 70–110)
POCT GLUCOSE: 126 MG/DL (ref 70–110)
POCT GLUCOSE: 149 MG/DL (ref 70–110)
POCT GLUCOSE: 170 MG/DL (ref 70–110)

## 2019-06-05 PROCEDURE — 97530 THERAPEUTIC ACTIVITIES: CPT

## 2019-06-05 PROCEDURE — 31720 CLEARANCE OF AIRWAYS: CPT

## 2019-06-05 PROCEDURE — 63600175 PHARM REV CODE 636 W HCPCS: Performed by: STUDENT IN AN ORGANIZED HEALTH CARE EDUCATION/TRAINING PROGRAM

## 2019-06-05 PROCEDURE — 99900035 HC TECH TIME PER 15 MIN (STAT)

## 2019-06-05 PROCEDURE — 27000221 HC OXYGEN, UP TO 24 HOURS

## 2019-06-05 PROCEDURE — 97116 GAIT TRAINING THERAPY: CPT

## 2019-06-05 PROCEDURE — 94761 N-INVAS EAR/PLS OXIMETRY MLT: CPT

## 2019-06-05 PROCEDURE — 25000003 PHARM REV CODE 250: Performed by: STUDENT IN AN ORGANIZED HEALTH CARE EDUCATION/TRAINING PROGRAM

## 2019-06-05 RX ADMIN — RIFAMPIN 300 MG: 600 INJECTION, POWDER, LYOPHILIZED, FOR SOLUTION INTRAVENOUS at 04:06

## 2019-06-05 RX ADMIN — INSULIN ASPART 6 UNITS: 100 INJECTION, SOLUTION INTRAVENOUS; SUBCUTANEOUS at 08:06

## 2019-06-05 RX ADMIN — CHLORHEXIDINE GLUCONATE 0.12% ORAL RINSE 15 ML: 1.2 LIQUID ORAL at 08:06

## 2019-06-05 RX ADMIN — MICONAZOLE NITRATE: 20 OINTMENT TOPICAL at 09:06

## 2019-06-05 RX ADMIN — AMLODIPINE BESYLATE 10 MG: 10 TABLET ORAL at 08:06

## 2019-06-05 RX ADMIN — INSULIN ASPART 6 UNITS: 100 INJECTION, SOLUTION INTRAVENOUS; SUBCUTANEOUS at 03:06

## 2019-06-05 RX ADMIN — CEFAZOLIN 2 G: 1 INJECTION, POWDER, FOR SOLUTION INTRAMUSCULAR; INTRAVENOUS at 05:06

## 2019-06-05 RX ADMIN — INSULIN ASPART 6 UNITS: 100 INJECTION, SOLUTION INTRAVENOUS; SUBCUTANEOUS at 12:06

## 2019-06-05 NOTE — PLAN OF CARE
06/05/19 1158   Post-Acute Status   Post-Acute Authorization Placement   Post-Acute Placement Status Set-up Complete     NICK spoke with Jc at [facility].  The pt can transfer now.  NICK set up wc van with 5 L of O2 to [facility].  NICK gave the nurse the number to call report.  NICK updated the pt's .    Ирина Godinez, [facility] [phone]

## 2019-06-05 NOTE — PLAN OF CARE
06/05/19 0920   Post-Acute Status   Post-Acute Authorization Placement   Post-Acute Placement Status Authorization Obtained     Pt will transfer to [facility] today.  NICK spoke with Jc at [facility].  He is working on getting the meds and cost approved for meds.  He will call the SW when the pt can be transferred.  SW will f/u as needed.    Ирина Godinez, [facility] x [phone]

## 2019-06-05 NOTE — PLAN OF CARE
[facility]    FACILITY TRANSFER ORDERS      Patient Name: Mariann Huff  YOB: 1960    PCP: Jasbir Haney MD   PCP Address: [address]  PCP Phone Number: [phone]  PCP Fax: [phone]    Encounter Date: 06/05/2019    Admit to: [facility]    Vital Signs:  Routine    Diagnoses:   Active Hospital Problems    Diagnosis  POA    *Ureteral transection of left native kidney [S37.10XA]  Yes    Impaired functional mobility and endurance [Z74.09]  Unknown    Acute deep vein thrombosis (DVT) of femoral vein of right lower extremity [I82.411]  No    Palliative care encounter [Z51.5]  Not Applicable    Pain [R52]  Unknown    Protein malnutrition [E46]  No    Rectal ulcer [K62.6]  Yes    Status post insertion of percutaneous endoscopic gastrostomy (PEG) tube [Z93.1]  Not Applicable    Urinary tract infection without hematuria [N39.0]  Unknown    Delayed surgical wound healing [T81.89XA]  Yes    Bradycardia [R00.1]  No     The etiology of the bradycardic episode is unclear.  The have appear to be respiratory in origin (at least the 1st episode).  We will continue to monitor carefully.  We are awaiting evaluation by Cardiology.          BRBPR (bright red blood per rectum) [K62.5]  Unknown    Dermatitis associated with moisture [L30.8]  Yes    Anemia [D64.9]  Unknown    Postoperative infection [T81.40XA]  No    Acute postoperative respiratory failure [J95.821]  No    Pulmonary edema [J81.1]  No    On enteral nutrition [Z78.9]  Unknown    At risk for fluid volume overload [Z91.89]  Not Applicable    Altered mental status [R41.82]  Yes    Encounter for weaning from ventilator [Z99.11]  Not Applicable    Metabolic acidosis [E87.2]  No    Sepsis [A41.9]  Yes    Encephalopathy [G93.40]  Yes    Type 2 diabetes mellitus, with long-term current use of insulin [E11.9, Z79.4]  Not Applicable    HLD (hyperlipidemia) [E78.5]  Yes    Asthma  "[J45.909]  Yes    Essential hypertension [I10]  Yes    Arthritis of lumbar spine [M47.816]  Yes     Chronic    Type 2 diabetes mellitus with diabetic peripheral angiopathy without gangrene [E11.51]  Yes     Chronic    PAPA (acute kidney injury) [N17.9]  Yes    Sleep apnea [G47.30]  Yes     Chronic    CAD (coronary artery disease) [I25.10]  Yes     Chronic     10/21/13: LAD GINGER prox and distal, unable to cross 80% OM1 lesion.  Old OM1 stent in 2003, LAD stent in 2010        Resolved Hospital Problems   No resolved problems to display.       Allergies:Review of patient's allergies indicates:  No Known Allergies    Diet: tube feedings: Continuous Peptamen Intense VHP at 45 mL per hour for 24 hours per day   Activities: Activity as tolerated   Nursing:   Labs: CBC and BMP every other day   CONSULTS:   Physical Therapy to evaluate and treat. , Occupational Therapy to evaluate and treat., Speech Therapy to evaluate and treat for Language and Swallowing. and  to evaluate for community resources/long-range planning.   MISCELLANEOUS CARE:  Diabetes Care:   SN to perform and educate Diabetic management with blood glucose monitoring: and Fingerstick blood sugar every 4 hours  Insulin aspart U-100 pen 1-10 units every 4 hours PRN  **MODERATE CORRECTION DOSE**   Blood Glucose   Mg/dL                  0600                                1000                                1400                2200                                 1800                0200   151-200               2 units             1 unit   201-250               4 units             2 units   251-300               6 units             3 units   301-350               8 units             4 units   >350                     10 units           5 units   Administer subcutaneously if needed at times designated by monitoring schedule.   "HIGH ALERT MEDICATION" - Administer with meals or TF/TPN.         Scheduled insulin aspart U-100 pen 6 units every 4 " "hours     Routine PICC line care     ceFAZolin injection 2 g   [[id number]]            Ordered Dose: 2 g Route: Intravenous Frequency: Every 8 hours (non-standard times)   Administration Dose: 2 g            STOP DATE: 6/18/19     rifAMpin (RIFADIN) 300 mg in sodium chloride 0.9% 100 mL IVPB   [[id number]]      Ordered Dose: 300 mg Route: Intravenous Frequency: Every 12 hours (non-standard times) @ 100 mL/hr over 60 Minutes   Volume: 100 mL Stability: 24 Hours      STOP DATE: 6/18/19     WOUND CARE ORDERS  Yes: Surgical Wound:  Location: back  Nursing to cleanse back incisions x2 with sterile normal saline and apply medi-honey to each incision. Cover with Mepore bordered gauze dressings.     Nursing to cleanse perineal area BID and PRN with wipes. Apply barrier antifungal cream BID to the buttocks, perianal, groin area.      Medications: Review discharge medications with patient and family and provide education.      Current Discharge Medication List      CONTINUE these medications which have NOT CHANGED    Details   albuterol (PROVENTIL/VENTOLIN HFA) 90 mcg/actuation inhaler Inhale 2 puffs into the lungs every 6 (six) hours as needed for Wheezing.  Qty: 1 each, Refills: 11      amLODIPine (NORVASC) 10 MG tablet TAKE 1 TABLET (10 MG TOTAL) BY MOUTH ONCE DAILY.  Qty: 30 tablet, Refills: 6    Associated Diagnoses: HTN (hypertension), benign      aspirin 81 mg Tab Take 81 mg by mouth. 1 Tablet Oral Every day      atorvastatin (LIPITOR) 40 MG tablet TAKE 1 TABLET (40 MG TOTAL) BY MOUTH ONCE DAILY.  Qty: 30 tablet, Refills: 11      ACCU-CHEK EDIN PLUS METER Misc       BD INSULIN PEN NEEDLE UF MINI 31 x 3/16 " Ndle USE 4 TIMES A DAY  Qty: 200 each, Refills: 11      blood sugar diagnostic (ACCU-CHEK EDIN PLUS TEST STRP) Strp TEST BLOOD SUGARS 4 TIMES DAILY  Qty: 150 strip, Refills: 11    Associated Diagnoses: Type II or unspecified type diabetes mellitus with peripheral circulatory disorders, uncontrolled(250.72)    " "  chlorthalidone (HYGROTEN) 50 MG Tab Take 1 tablet (50 mg total) by mouth once daily.  Qty: 90 tablet, Refills: 3    Associated Diagnoses: Hypertension associated with diabetes      esomeprazole (NEXIUM) 40 MG capsule TAKE 1 CAPSULE (40 MG TOTAL) BY MOUTH BEFORE BREAKFAST.  Qty: 90 capsule, Refills: 3      fenofibrate micronized (LOFIBRA) 134 MG Cap TAKE 1 CAPSULE (134 MG TOTAL) BY MOUTH BEFORE BREAKFAST.  Qty: 90 capsule, Refills: 3      flash glucose scanning reader (FREESTYLE MISTI 14 DAY READER) Misc 1 each by Misc.(Non-Drug; Combo Route) route once daily.  Qty: 2 each, Refills: 11      flash glucose sensor (FREESTYLE IMSTI 14 DAY SENSOR) Kit 1 each by Misc.(Non-Drug; Combo Route) route once daily.  Qty: 2 kit, Refills: 11      gabapentin (NEURONTIN) 100 MG capsule Take 2 capsules (200 mg total) by mouth every evening.  Qty: 60 capsule, Refills: 11      irbesartan (AVAPRO) 300 MG tablet Take 1 tablet (300 mg total) by mouth every evening.  Qty: 90 tablet, Refills: 3      lancets 30 gauge Misc       metoprolol succinate (TOPROL-XL) 100 MG 24 hr tablet TAKE 1 TABLET (100 MG TOTAL) BY MOUTH ONCE DAILY.  Qty: 30 tablet, Refills: 11      nitroGLYCERIN (NITROSTAT) 0.4 MG SL tablet Take one every 2-3 min till chestpain relief for 3 times and if still no relief, call MD or come to ED  Qty: 30 tablet, Refills: 11      omega-3 acid ethyl esters (LOVAZA) 1 gram capsule Take 1 g by mouth once daily.       pen needle, diabetic (BD ULTRA-FINE GRABIEL PEN NEEDLES) 32 gauge x 5/32" Ndle For use with insulin pens daily 4 times a day  Qty: 100 each, Refills: 11      tamsulosin (FLOMAX) 0.4 mg Cap Take 1 capsule (0.4 mg total) by mouth every evening.  Qty: 30 capsule, Refills: 2    Associated Diagnoses: Ureteral stent retained      tramadol (ULTRAM) 50 mg tablet Take 1 tablet (50 mg total) by mouth 3 (three) times daily as needed for Pain.  Qty: 60 tablet, Refills: 1      zolpidem (AMBIEN) 5 MG Tab Take 1 tablet (5 mg total) by " mouth nightly as needed.  Qty: 30 tablet, Refills: 2    Comments: Not to exceed 5 additional fills before 09/25/2018         STOP taking these medications       cyclobenzaprine (FLEXERIL) 10 MG tablet Comments:   Reason for Stopping:         insulin aspart U-100 (NOVOLOG FLEXPEN U-100 INSULIN) 100 unit/mL InPn pen Comments:   Reason for Stopping:         insulin glargine, TOUJEO, (TOUJEO SOLOSTAR U-300 INSULIN) 300 unit/mL (1.5 mL) InPn pen Comments:   Reason for Stopping:         INVOKANA 300 mg Tab tablet Comments:   Reason for Stopping:         prasugrel (EFFIENT) 10 mg Tab Comments:   Reason for Stopping:         TRULICITY 1.5 mg/0.5 mL PnIj Comments:   Reason for Stopping:                    _________________________________  Mook Ferguson MD  06/05/2019

## 2019-06-05 NOTE — PROGRESS NOTES
[facility]  Urology  Progress Note    Patient Name: Mariann Huff  MRN: [id number]  Admission Date: 4/20/2019  Hospital Length of Stay: 46 days  Code Status: Full Code   Attending Provider: Robin Boyd MD   Primary Care Physician: Jasbir Haney MD    Subjective:     HPI:  Mariann Huff is a 59 y.o. female with history of HTN, type 2 diabetes mellitus, CAD, NSTEMI, and back pain who presents to [facility] with altered mental status and sepsis. She underwent L5-S1 OLIF with NSGY on 4/12 and had intraoperative left ureteral injury with ureteroureteral anastomosis and ureteral stent placement. She did well initially and had correa and MADHURI drain removed on 4/16. She began having chills and altered mental status 2 days ago and this has progressively worsened. No complaints of pain.     She is altered and HPI is limited. In the ED she is febrile to 103 and tachycardic and pressures are low normal. WBC is 5, creatinine is 3.6 baseline 1.0, lactate is 4.6. Cath UA concerning for infection, 3+ LE, >100 WBCs, and many bacteria on microscopy.    CT and MRI abdomen both show air with minimal fluid near the surgical site with left ureter coursing through. There is air throughout the proximal collecting system which is decompressed with JJ ureteral stent in good position.    Taken for ex lap, ligation of left ureter and left neph tube placement on 4/21/19.    Interval History: No acute events. Afebrile. UOP adequate. Patient tolerated speaking valve yesterday.    Review of Systems  Objective:     Temp:  [97.7 °F (36.5 °C)-98.9 °F (37.2 °C)] 98.4 °F (36.9 °C)  Pulse:  [] 101  Resp:  [16-20] 16  SpO2:  [95 %-100 %] 95 %  BP: (135-149)/(61-73) 137/66     Body mass index is 26.05 kg/m².      Bladder Scan Volume (mL): 27 mL (05/10/19 0846)  Post Void Cath Residual Output (mL): 27 mL (05/10/19 0846)    Drains     Drain                 Nephrostomy 04/21/19 0629 Left 8 Fr. 45 days         Gastrostomy/Enterostomy  05/02/19 1134 Percutaneous endoscopic gastrostomy (PEG) LUQ decompression;feeding 33 days         Urethral Catheter 05/29/19 0151 7 days                Physical Exam   Constitutional: She appears well-developed. No distress.   HENT:   Head: Normocephalic and atraumatic.   Neck: No JVD present.   Cardiovascular: Normal rate and regular rhythm.    Pulmonary/Chest: Effort normal. No respiratory distress.   Trach collar   Abdominal: Soft. She exhibits no distension. There is no rebound and no guarding.   Incision c/d/i   G-tube   Genitourinary:   Genitourinary Comments: Fontenot in place draining clear yellow urine  Left neph tube with clear yellow urine   Neurological: She is alert.   Skin: Skin is warm and dry. She is not diaphoretic. No pallor.         Significant Labs:    BMP:  Recent Labs   Lab 05/31/19  0535 06/02/19  0427 06/04/19  0451    146* 144   K 3.8 3.3* 3.6    108 109   CO2 27 30* 28   BUN 21* 25* 38*   CREATININE 0.8 0.8 0.8   CALCIUM 9.5 9.5 9.5       CBC:   Recent Labs   Lab 06/01/19  1712 06/02/19  0427 06/04/19  0451   WBC 14.70* 12.91* 11.52   HGB 9.5* 8.4* 8.3*   HCT 30.4* 26.8* 27.4*    256 252     Significant Imaging:  All pertinent imaging results/findings from the past 24 hours have been reviewed.                  Assessment/Plan:     * Ureteral transection of left native kidney  Mariann Huff is a 59 y.o. female s/p left ureteral injury on 4/12, presented with fever, AMS, and intraabdominal abscess, taken for washout and ligation of left ureter with neph tube placement on 4/21, Trach/PEG 5/2.  - on trach collar, Speech on board  - continue TF  - ID on board, appreciate recs:   - completed cefepime x 7 days, now on Ancef; continue rifampin and Ancef until 6/18/19   - 5/13 UCx growing Candida albicans; 7 day course of diflucan 800 mg now complete   - 5/19 BAL growing Pseudomonas   - 6/1 repeat blood cultures - NGTD   - ID on board, appreciate recs  - Maintain neph tube and  correa; patient failed VT on 5/28/19  - Urine output adequate  - bloody BMs - flexible sigmoidoscopy on 5/21/19 showed no signs of bleeding, Colonoscopy showed healing rectal ulcer, EGD not indicated per GI; H&H stable  - heparin for RUE DVT held due to GI bleeding, patient found on 5/29 to have RLE DVT; IVC filter placed 5/29  - labs every other day  - trach downsized to #6 on 6/3/19  - Speech to evaluate patient for speaking valve    Dispo: patient stable on trach collar, pending acceptance to rehab facility; likely transfer today    Sepsis  As above        VTE Risk Mitigation (From admission, onward)        Ordered     IP VTE LOW RISK PATIENT  Once      05/08/19 1315     Place sequential compression device  Until discontinued      04/20/19 9238          Mook Ferguson MD  Urology  [facility]

## 2019-06-05 NOTE — PLAN OF CARE
Problem: Adult Inpatient Plan of Care  Goal: Plan of Care Review  Outcome: Ongoing (interventions implemented as appropriate)  Patient AAOx3 w/ VSS on 5L 20% number 6 shiley w/ unlabored breathing. Patient tolerated diet well w/ some complaints of nausea relieved by antiemetics. Tube feeds continued at 45mL/hr, water bolus' given x3.Blood glucose monitoring maintained. Patient's trach in place and suctioned as needed. Nephrostomy tube intact to gravity, dressing changed today. Patient repositioned q2 and refused other activity. Patient is resting w/ side rails raised x3, suction at bedside, tube feeds infusing, trach collar in place w/ O2 at 5L and 20%, remains free from falls or injury, no distress noted. Nurse will continue to monitor.

## 2019-06-05 NOTE — PLAN OF CARE
Problem: Adult Inpatient Plan of Care  Goal: Plan of Care Review  Outcome: Ongoing (interventions implemented as appropriate)  AAO x 4. R and L back incisions with steri strips, CDI. L nephrostomy. Pt has peg tube feedings going at 45 mL/hr, tolerating well with no complaints of N/V overnight. NPO. Pt has correa draining yellow urine. x2 assist with walker. Has #6 Shiley trach 5L O2 at 28%. On tele running sinus tach. Accuchecks q4hr, did not have to be covered overnight. Pt complained of pain overnight, received PRN medications. Pt remained free of falls overnight. POC reviewed with pt who verbalized understanding. Bed in low position, call light in reach. No signs of distress or complaints noted at this time. WCTM.

## 2019-06-05 NOTE — PLAN OF CARE
Problem: Physical Therapy Goal  Goal: Physical Therapy Goal  Goals to be met by:19    Patient will increase functional independence with mobility by performin. Supine to sit with Moderate Assistance - met  Revised goal: supine to sit through sidelying with minimal assist- met   2. Sit to stand transfer with Minimal Assistance - met   3. Gait  x 30 feet with Contact Guard Assistance using Rolling Walker. - met   4. Lower extremity exercise program x15 reps , with assistance as needed- met   5. Pt supervision bed mobility- not met  6. Pt supervision sit to /from stand - not met  7. Pt receive gait training ~ 100 ft with RW and CGA - not met        Goals updated for content and are appropriate. Cathy Taylor, PT 2019

## 2019-06-05 NOTE — CARE UPDATE
BG goal 140 - 180   BG is within on current SQ insulin regimen while receiving TF.       Continue Novolog to 6 units every 4 hours while on TF.   Moderate Dose SQ Insulin Correction Scale.  BG monitoring q 4 hrs.      ** Please notify Endocrine for any change and/or advance in diet/TF**  ** Please call Endocrine for any BG related issues **     Discharge Recommendations:     TBD.

## 2019-06-05 NOTE — NURSING
Discharge Plans reviewed w/ pt. AVSS on 5L/28% via trach collar. Report given to Liana at [facility]. Awaiting transport.

## 2019-06-05 NOTE — PT/OT/SLP PROGRESS
Speech Language Pathology      Mariann Huff  MRN: [id number]    Nursing and transport arriving to room for pt discharge to rehab upon ST attempt this afternoon.  Pt/family/nursing reporting tolerance of PMSV.  ST reviewed precautions.  Pt achieving fair vocal quality/intensity with valve in place.  Pt/family denied questions for ST. No charges posted to pt's account.     MAURICE Cortez, CCC-SLP   6/5/2019

## 2019-06-05 NOTE — PT/OT/SLP PROGRESS
Physical Therapy Treatment    Patient Name:  Mariann Huff   MRN:  [id number]    Recommendations:     Discharge Recommendations:  rehabilitation facility   Discharge Equipment Recommendations: (will determine DME needs closer to discharge. )   Barriers to discharge: Decreased caregiver support family may not be able to assist at current functional level.     Assessment:     Mariann Huff is a 59 y.o. female admitted with a medical diagnosis of Ureteral transection of left native kidney.  She presents with the following impairments/functional limitations:  weakness, gait instability, impaired endurance, impaired balance, impaired functional mobilty, decreased safety awareness, decreased lower extremity function  Pt kenyetta treatment better being able to gait train farther. Pt will benefit from cont skilled PT 4x/wk to progress physically. Pt will need inpt rehab when medically stable. Pt is s/p exp lap, ligation L ureter 4/21, PEG/trach 5/2/19. Pt had back sx 4/12/19 previous admit.     Rehab Prognosis: Good; patient would benefit from acute skilled PT services to address these deficits and reach maximum level of function.    Recent Surgery: Procedure(s) (LRB):  COLONOSCOPY (N/A) 12 Days Post-Op    Plan:     During this hospitalization, patient to be seen 4 x/week to address the identified rehab impairments via gait training, therapeutic activities, therapeutic exercises, neuromuscular re-education and progress toward the following goals:    · Plan of Care Expires:  07/02/19    Subjective     Chief Complaint: pt had no complaints during treatment.   Patient/Family Comments/goals:  To get better and go home.   Pain/Comfort:  · Pain Rating 1: 0/10  · Pain Rating Post-Intervention 1: 0/10      Objective:     Communicated with nurse prior to session.  Patient found supine with tracheostomy, oxygen, PEG Tube, PICC line upon PT entry to room.     General Precautions: Standard, fall   Orthopedic Precautions:N/A   Braces: LSO      Functional Mobility:  · Bed Mobility:  Pt needed verbal cues for hand placement and sequencing for functional mobility.   · Rolling Left:  moderate assistance  · Supine to Sit: moderate assistance  ·   · Transfers:     · Sit to Stand:  minimum assistance with rolling walker. Pt stood x 5 reps. Pt needed to be cleaned after gait training  ·   · Gait: pt received gait training ~ 44 ft, 30 ft, (74 ft total) with LSO brace, RW, O2 and CGA. pt needed verbal cues for increased base of support with gait training. Pt had sitting rest period between distance ambulated and was additional assist of 1 for managing equipment and following with chair.       AM-PAC 6 CLICK MOBILITY  Turning over in bed (including adjusting bedclothes, sheets and blankets)?: 3  Sitting down on and standing up from a chair with arms (e.g., wheelchair, bedside commode, etc.): 3  Moving from lying on back to sitting on the side of the bed?: 3  Moving to and from a bed to a chair (including a wheelchair)?: 3  Need to walk in hospital room?: 3  Climbing 3-5 steps with a railing?: 2  Basic Mobility Total Score: 17         Patient left up on bedside commode with all lines intact and call button in reach..    GOALS:   Multidisciplinary Problems     Physical Therapy Goals        Problem: Physical Therapy Goal    Goal Priority Disciplines Outcome Goal Variances Interventions   Physical Therapy Goal     PT, PT/OT Ongoing (interventions implemented as appropriate)     Description:  Goals to be met by:19    Patient will increase functional independence with mobility by performin. Supine to sit with Moderate Assistance - met  Revised goal: supine to sit through sidelying with minimal assist- met   2. Sit to stand transfer with Minimal Assistance - met   3. Gait  x 30 feet with Contact Guard Assistance using Rolling Walker. - met   4. Lower extremity exercise program x15 reps , with assistance as needed- met   5. Pt supervision bed  mobility- not met  6. Pt supervision sit to /from stand - not met  7. Pt receive gait training ~ 100 ft with RW and CGA - not met                          Time Tracking:     PT Received On: 06/05/19  PT Start Time: 0844     PT Stop Time: 0911  PT Total Time (min): 27 min     Billable Minutes: Gait Training 12 min and Therapeutic Activity 15 min    Treatment Type: Treatment  PT/PTA: PT     PTA Visit Number: 0     Cathy Taylor, PT  06/05/2019

## 2019-06-05 NOTE — PLAN OF CARE
06/05/19 1200   Final Note   Assessment Type Final Discharge Note   Anticipated Discharge Disposition Rehab   What phone number can be called within the next 1-3 days to see how you are doing after discharge? [phone]   Hospital Follow Up  Appt(s) scheduled? Yes   Discharge plans and expectations educations in teach back method with documentation complete? Yes   Right Care Referral Info   Post Acute Recommendation IRF   Referral Type Inpatient Rehab Facility   Facility Name [facility]      Pt is discharged, facility transfer orders and reviewed by [facility] admitting MD. Transfer orders placed by NICK. Met with patient in room to inform of discharge to Rehab today. The patient and spouse are in agreement with the plan to transfer to [facility].  contact information was given to Mr. Huff and was encouraged to call with any questions or concerns regarding discharge.

## 2019-06-05 NOTE — PLAN OF CARE
[facility]    FACILITY TRANSFER ORDERS      Patient Name: Mariann Huff  YOB: 1960    PCP: Jasbir Haney MD   PCP Address: [address] / [id number]  PCP Phone Number: [phone]  PCP Fax: [phone]    Encounter Date: 06/05/2019    Admit to: [facility]    Vital Signs:  Routine    Diagnoses:   Active Hospital Problems    Diagnosis  POA    *Ureteral transection of left native kidney [S37.10XA]  Yes    Impaired functional mobility and endurance [Z74.09]  Unknown    Acute deep vein thrombosis (DVT) of femoral vein of right lower extremity [I82.411]  No    Palliative care encounter [Z51.5]  Not Applicable    Pain [R52]  Unknown    Protein malnutrition [E46]  No    Rectal ulcer [K62.6]  Yes    Status post insertion of percutaneous endoscopic gastrostomy (PEG) tube [Z93.1]  Not Applicable    Urinary tract infection without hematuria [N39.0]  Unknown    Delayed surgical wound healing [T81.89XA]  Yes    Bradycardia [R00.1]  No     The etiology of the bradycardic episode is unclear.  The have appear to be respiratory in origin (at least the 1st episode).  We will continue to monitor carefully.  We are awaiting evaluation by Cardiology.          BRBPR (bright red blood per rectum) [K62.5]  Unknown    Dermatitis associated with moisture [L30.8]  Yes    Anemia [D64.9]  Unknown    Postoperative infection [T81.40XA]  No    Acute postoperative respiratory failure [J95.821]  No    Pulmonary edema [J81.1]  No    On enteral nutrition [Z78.9]  Unknown    At risk for fluid volume overload [Z91.89]  Not Applicable    Altered mental status [R41.82]  Yes    Encounter for weaning from ventilator [Z99.11]  Not Applicable    Metabolic acidosis [E87.2]  No    Sepsis [A41.9]  Yes    Encephalopathy [G93.40]  Yes    Type 2 diabetes mellitus, with long-term current use of insulin [E11.9, Z79.4]  Not Applicable    HLD (hyperlipidemia) [E78.5]  Yes    Asthma  "[J45.909]  Yes    Essential hypertension [I10]  Yes    Arthritis of lumbar spine [M47.816]  Yes     Chronic    Type 2 diabetes mellitus with diabetic peripheral angiopathy without gangrene [E11.51]  Yes     Chronic    PAPA (acute kidney injury) [N17.9]  Yes    Sleep apnea [G47.30]  Yes     Chronic    CAD (coronary artery disease) [I25.10]  Yes     Chronic     10/21/13: LAD GINGER prox and distal, unable to cross 80% OM1 lesion.  Old OM1 stent in 2003, LAD stent in 2010        Resolved Hospital Problems   No resolved problems to display.       Allergies:Review of patient's allergies indicates:  No Known Allergies    Diet: tube feedings: Continuous Peptamen Intense VHP at 45 mL per hour for 24 hours per day    Activities: Activity as tolerated    Nursing:      Labs: CBC and BMP every other day     CONSULTS:    Physical Therapy to evaluate and treat. , Occupational Therapy to evaluate and treat., Speech Therapy to evaluate and treat for Language and Swallowing. and  to evaluate for community resources/long-range planning.    MISCELLANEOUS CARE:  Diabetes Care:   SN to perform and educate Diabetic management with blood glucose monitoring: and Fingerstick blood sugar every 4 hours  Insulin aspart U-100 pen 1-10 units every 4 hours PRN  **MODERATE CORRECTION DOSE**   Blood Glucose   Mg/dL                  0600                                1000                                1400                2200                                 1800                0200   151-200               2 units             1 unit   201-250               4 units             2 units   251-300               6 units             3 units   301-350               8 units             4 units   >350                     10 units           5 units   Administer subcutaneously if needed at times designated by monitoring schedule.   "HIGH ALERT MEDICATION" - Administer with meals or TF/TPN.        Scheduled insulin aspart U-100 pen 6 units every " "4 hours    Routine PICC line care    ceFAZolin injection 2 g   [[id number]]     Ordered Dose: 2 g Route: Intravenous Frequency: Every 8 hours (non-standard times)   Administration Dose: 2 g        STOP DATE: 6/18/19    rifAMpin (RIFADIN) 300 mg in sodium chloride 0.9% 100 mL IVPB   [[id number]]     Ordered Dose: 300 mg Route: Intravenous Frequency: Every 12 hours (non-standard times) @ 100 mL/hr over 60 Minutes   Volume: 100 mL Stability: 24 Hours     STOP DATE: 6/18/19    WOUND CARE ORDERS  Yes: Surgical Wound:  Location: back  Nursing to cleanse back incisions x2 with sterile normal saline and apply medi-honey to each incision. Cover with Mepore bordered gauze dressings.    Nursing to cleanse perineal area BID and PRN with wipes. Apply barrier antifungal cream BID to the buttocks, perianal, groin area.       Medications: Review discharge medications with patient and family and provide education.      Current Discharge Medication List      CONTINUE these medications which have NOT CHANGED    Details   albuterol (PROVENTIL/VENTOLIN HFA) 90 mcg/actuation inhaler Inhale 2 puffs into the lungs every 6 (six) hours as needed for Wheezing.  Qty: 1 each, Refills: 11      amLODIPine (NORVASC) 10 MG tablet TAKE 1 TABLET (10 MG TOTAL) BY MOUTH ONCE DAILY.  Qty: 30 tablet, Refills: 6    Associated Diagnoses: HTN (hypertension), benign      aspirin 81 mg Tab Take 81 mg by mouth. 1 Tablet Oral Every day      atorvastatin (LIPITOR) 40 MG tablet TAKE 1 TABLET (40 MG TOTAL) BY MOUTH ONCE DAILY.  Qty: 30 tablet, Refills: 11      ACCU-CHEK EDIN PLUS METER Misc       BD INSULIN PEN NEEDLE UF MINI 31 x 3/16 " Ndle USE 4 TIMES A DAY  Qty: 200 each, Refills: 11      blood sugar diagnostic (ACCU-CHEK EDIN PLUS TEST STRP) Strp TEST BLOOD SUGARS 4 TIMES DAILY  Qty: 150 strip, Refills: 11    Associated Diagnoses: Type II or unspecified type diabetes mellitus with peripheral circulatory disorders, uncontrolled(250.72)      chlorthalidone " "(HYGROTEN) 50 MG Tab Take 1 tablet (50 mg total) by mouth once daily.  Qty: 90 tablet, Refills: 3    Associated Diagnoses: Hypertension associated with diabetes      esomeprazole (NEXIUM) 40 MG capsule TAKE 1 CAPSULE (40 MG TOTAL) BY MOUTH BEFORE BREAKFAST.  Qty: 90 capsule, Refills: 3      fenofibrate micronized (LOFIBRA) 134 MG Cap TAKE 1 CAPSULE (134 MG TOTAL) BY MOUTH BEFORE BREAKFAST.  Qty: 90 capsule, Refills: 3      flash glucose scanning reader (FREESTYLE MISTI 14 DAY READER) Misc 1 each by Misc.(Non-Drug; Combo Route) route once daily.  Qty: 2 each, Refills: 11      flash glucose sensor (FREESTYLE MISTI 14 DAY SENSOR) Kit 1 each by Misc.(Non-Drug; Combo Route) route once daily.  Qty: 2 kit, Refills: 11      gabapentin (NEURONTIN) 100 MG capsule Take 2 capsules (200 mg total) by mouth every evening.  Qty: 60 capsule, Refills: 11      irbesartan (AVAPRO) 300 MG tablet Take 1 tablet (300 mg total) by mouth every evening.  Qty: 90 tablet, Refills: 3      lancets 30 gauge Misc       metoprolol succinate (TOPROL-XL) 100 MG 24 hr tablet TAKE 1 TABLET (100 MG TOTAL) BY MOUTH ONCE DAILY.  Qty: 30 tablet, Refills: 11      nitroGLYCERIN (NITROSTAT) 0.4 MG SL tablet Take one every 2-3 min till chestpain relief for 3 times and if still no relief, call MD or come to ED  Qty: 30 tablet, Refills: 11      omega-3 acid ethyl esters (LOVAZA) 1 gram capsule Take 1 g by mouth once daily.       pen needle, diabetic (BD ULTRA-FINE GRABEIL PEN NEEDLES) 32 gauge x 5/32" Ndle For use with insulin pens daily 4 times a day  Qty: 100 each, Refills: 11      tamsulosin (FLOMAX) 0.4 mg Cap Take 1 capsule (0.4 mg total) by mouth every evening.  Qty: 30 capsule, Refills: 2    Associated Diagnoses: Ureteral stent retained      tramadol (ULTRAM) 50 mg tablet Take 1 tablet (50 mg total) by mouth 3 (three) times daily as needed for Pain.  Qty: 60 tablet, Refills: 1      zolpidem (AMBIEN) 5 MG Tab Take 1 tablet (5 mg total) by mouth nightly as " needed.  Qty: 30 tablet, Refills: 2    Comments: Not to exceed 5 additional fills before 09/25/2018         STOP taking these medications       cyclobenzaprine (FLEXERIL) 10 MG tablet Comments:   Reason for Stopping:         insulin aspart U-100 (NOVOLOG FLEXPEN U-100 INSULIN) 100 unit/mL InPn pen Comments:   Reason for Stopping:         insulin glargine, TOUJEO, (TOUJEO SOLOSTAR U-300 INSULIN) 300 unit/mL (1.5 mL) InPn pen Comments:   Reason for Stopping:         INVOKANA 300 mg Tab tablet Comments:   Reason for Stopping:         prasugrel (EFFIENT) 10 mg Tab Comments:   Reason for Stopping:         TRULICITY 1.5 mg/0.5 mL PnIj Comments:   Reason for Stopping:                    _________________________________  Mook Ferguson MD  06/05/2019

## 2019-06-05 NOTE — SUBJECTIVE & OBJECTIVE
Interval History: No acute events. Afebrile. UOP adequate. Patient tolerated speaking valve yesterday.    Review of Systems  Objective:     Temp:  [97.7 °F (36.5 °C)-98.9 °F (37.2 °C)] 98.4 °F (36.9 °C)  Pulse:  [] 101  Resp:  [16-20] 16  SpO2:  [95 %-100 %] 95 %  BP: (135-149)/(61-73) 137/66     Body mass index is 26.05 kg/m².      Bladder Scan Volume (mL): 27 mL (05/10/19 0846)  Post Void Cath Residual Output (mL): 27 mL (05/10/19 0846)    Drains     Drain                 Nephrostomy 04/21/19 0629 Left 8 Fr. 45 days         Gastrostomy/Enterostomy 05/02/19 1134 Percutaneous endoscopic gastrostomy (PEG) LUQ decompression;feeding 33 days         Urethral Catheter 05/29/19 0151 7 days                Physical Exam   Constitutional: She appears well-developed. No distress.   HENT:   Head: Normocephalic and atraumatic.   Neck: No JVD present.   Cardiovascular: Normal rate and regular rhythm.    Pulmonary/Chest: Effort normal. No respiratory distress.   Trach collar   Abdominal: Soft. She exhibits no distension. There is no rebound and no guarding.   Incision c/d/i   G-tube   Genitourinary:   Genitourinary Comments: Fontenot in place draining clear yellow urine  Left neph tube with clear yellow urine   Neurological: She is alert.   Skin: Skin is warm and dry. She is not diaphoretic. No pallor.         Significant Labs:    BMP:  Recent Labs   Lab 05/31/19  0535 06/02/19  0427 06/04/19  0451    146* 144   K 3.8 3.3* 3.6    108 109   CO2 27 30* 28   BUN 21* 25* 38*   CREATININE 0.8 0.8 0.8   CALCIUM 9.5 9.5 9.5       CBC:   Recent Labs   Lab 06/01/19  1712 06/02/19  0427 06/04/19  0451   WBC 14.70* 12.91* 11.52   HGB 9.5* 8.4* 8.3*   HCT 30.4* 26.8* 27.4*    256 252     Significant Imaging:  All pertinent imaging results/findings from the past 24 hours have been reviewed.

## 2019-06-05 NOTE — ASSESSMENT & PLAN NOTE
Mariann Huff is a 59 y.o. female s/p left ureteral injury on 4/12, presented with fever, AMS, and intraabdominal abscess, taken for washout and ligation of left ureter with neph tube placement on 4/21, Trach/PEG 5/2.  - on trach collar, Speech on board  - continue TF  - ID on board, appreciate recs:   - completed cefepime x 7 days, now on Ancef; continue rifampin and Ancef until 6/18/19   - 5/13 UCx growing Candida albicans; 7 day course of diflucan 800 mg now complete   - 5/19 BAL growing Pseudomonas   - 6/1 repeat blood cultures - NGTD   - ID on board, appreciate recs  - Maintain neph tube and correa; patient failed VT on 5/28/19  - Urine output adequate  - bloody BMs - flexible sigmoidoscopy on 5/21/19 showed no signs of bleeding, Colonoscopy showed healing rectal ulcer, EGD not indicated per GI; H&H stable  - heparin for RUE DVT held due to GI bleeding, patient found on 5/29 to have RLE DVT; IVC filter placed 5/29  - labs every other day  - trach downsized to #6 on 6/3/19  - Speech to evaluate patient for speaking valve    Dispo: patient stable on trach collar, pending acceptance to rehab facility; likely transfer today

## 2019-06-05 NOTE — PLAN OF CARE
SW was informed that the [facility] van came without O2 and did not come until 3pm.  They had to borrow O2 from the floor.  SW informed CM  of transportation delay.    Ирина Godinez, [facility] x [phone]

## 2019-06-06 ENCOUNTER — HOSPITAL ENCOUNTER (OUTPATIENT)
Dept: RADIOLOGY | Facility: HOSPITAL | Age: 59
Discharge: HOME | End: 2019-06-06
Attending: PHYSICAL MEDICINE & REHABILITATION
Payer: MEDICARE

## 2019-06-06 ENCOUNTER — HOSPITAL ENCOUNTER (INPATIENT)
Facility: HOSPITAL | Age: 59
LOS: 6 days | Discharge: NURSING, REHAB OR LONG-TERM CARE | DRG: 870 | End: 2019-06-12
Attending: EMERGENCY MEDICINE | Admitting: INTERNAL MEDICINE
Payer: MEDICARE

## 2019-06-06 DIAGNOSIS — J96.22 ACUTE ON CHRONIC RESPIRATORY FAILURE WITH HYPOXIA AND HYPERCAPNIA: ICD-10-CM

## 2019-06-06 DIAGNOSIS — J96.01 ACUTE HYPOXEMIC RESPIRATORY FAILURE: ICD-10-CM

## 2019-06-06 DIAGNOSIS — J15.1 PNEUMONIA OF BOTH UPPER LOBES DUE TO PSEUDOMONAS SPECIES: ICD-10-CM

## 2019-06-06 DIAGNOSIS — R50.9 FEVER, UNSPECIFIED FEVER CAUSE: ICD-10-CM

## 2019-06-06 DIAGNOSIS — D64.9 NORMOCYTIC ANEMIA: ICD-10-CM

## 2019-06-06 DIAGNOSIS — Z91.89 AT RISK FOR SEPSIS: Primary | ICD-10-CM

## 2019-06-06 DIAGNOSIS — Z95.5 STENTED CORONARY ARTERY: ICD-10-CM

## 2019-06-06 DIAGNOSIS — J96.21 ACUTE ON CHRONIC RESPIRATORY FAILURE WITH HYPOXIA AND HYPERCAPNIA: ICD-10-CM

## 2019-06-06 DIAGNOSIS — J96.21 ACUTE ON CHRONIC RESPIRATORY FAILURE WITH HYPOXIA: ICD-10-CM

## 2019-06-06 DIAGNOSIS — I25.10 CORONARY ARTERY DISEASE INVOLVING NATIVE CORONARY ARTERY OF NATIVE HEART WITHOUT ANGINA PECTORIS: Chronic | ICD-10-CM

## 2019-06-06 DIAGNOSIS — R00.0 TACHYCARDIA: ICD-10-CM

## 2019-06-06 DIAGNOSIS — I21.4 NSTEMI (NON-ST ELEVATED MYOCARDIAL INFARCTION): ICD-10-CM

## 2019-06-06 PROBLEM — I50.30 (HFPEF) HEART FAILURE WITH PRESERVED EJECTION FRACTION: Status: ACTIVE | Noted: 2019-06-06

## 2019-06-06 PROBLEM — R79.89 ELEVATED TROPONIN: Status: ACTIVE | Noted: 2019-06-06

## 2019-06-06 LAB
ALBUMIN SERPL BCP-MCNC: 2.1 G/DL (ref 3.5–5.2)
ALLENS TEST: ABNORMAL
ALP SERPL-CCNC: 172 U/L (ref 55–135)
ALT SERPL W/O P-5'-P-CCNC: 8 U/L (ref 10–44)
ANION GAP SERPL CALC-SCNC: 14 MMOL/L (ref 8–16)
AST SERPL-CCNC: 32 U/L (ref 10–40)
B-HCG UR QL: NEGATIVE
BACTERIA #/AREA URNS AUTO: ABNORMAL /HPF
BACTERIA BLD CULT: NORMAL
BACTERIA BLD CULT: NORMAL
BASOPHILS # BLD AUTO: 0.07 K/UL (ref 0–0.2)
BASOPHILS NFR BLD: 0.5 % (ref 0–1.9)
BILIRUB SERPL-MCNC: 0.7 MG/DL (ref 0.1–1)
BILIRUB UR QL STRIP: NEGATIVE
BNP SERPL-MCNC: 1403 PG/ML (ref 0–99)
BUN SERPL-MCNC: 48 MG/DL (ref 6–20)
CALCIUM SERPL-MCNC: 9.7 MG/DL (ref 8.7–10.5)
CHLORIDE SERPL-SCNC: 106 MMOL/L (ref 95–110)
CLARITY UR REFRACT.AUTO: ABNORMAL
CO2 SERPL-SCNC: 22 MMOL/L (ref 23–29)
COLOR UR AUTO: ABNORMAL
CREAT SERPL-MCNC: 1.4 MG/DL (ref 0.5–1.4)
CTP QC/QA: YES
DELSYS: ABNORMAL
DIFFERENTIAL METHOD: ABNORMAL
EOSINOPHIL # BLD AUTO: 0.5 K/UL (ref 0–0.5)
EOSINOPHIL NFR BLD: 3.7 % (ref 0–8)
ERYTHROCYTE [DISTWIDTH] IN BLOOD BY AUTOMATED COUNT: 13.9 % (ref 11.5–14.5)
EST. GFR  (AFRICAN AMERICAN): 47.8 ML/MIN/1.73 M^2
EST. GFR  (NON AFRICAN AMERICAN): 41.4 ML/MIN/1.73 M^2
FIO2: 100
FLOW: 15
GLUCOSE SERPL-MCNC: 285 MG/DL (ref 70–110)
GLUCOSE UR QL STRIP: NEGATIVE
HCO3 UR-SCNC: 32.4 MMOL/L (ref 24–28)
HCT VFR BLD AUTO: 25.6 % (ref 37–48.5)
HGB BLD-MCNC: 7.8 G/DL (ref 12–16)
HGB UR QL STRIP: ABNORMAL
HYALINE CASTS UR QL AUTO: 0 /LPF
IMM GRANULOCYTES # BLD AUTO: 0.09 K/UL (ref 0–0.04)
IMM GRANULOCYTES NFR BLD AUTO: 0.7 % (ref 0–0.5)
INR PPP: 1.1 (ref 0.8–1.2)
KETONES UR QL STRIP: NEGATIVE
LACTATE SERPL-SCNC: 1.8 MMOL/L (ref 0.5–2.2)
LEUKOCYTE ESTERASE UR QL STRIP: ABNORMAL
LIPASE SERPL-CCNC: 20 U/L (ref 4–60)
LYMPHOCYTES # BLD AUTO: 2.3 K/UL (ref 1–4.8)
LYMPHOCYTES NFR BLD: 17.1 % (ref 18–48)
MAGNESIUM SERPL-MCNC: 2.2 MG/DL (ref 1.6–2.6)
MCH RBC QN AUTO: 28.2 PG (ref 27–31)
MCHC RBC AUTO-ENTMCNC: 30.5 G/DL (ref 32–36)
MCV RBC AUTO: 92 FL (ref 82–98)
MICROSCOPIC COMMENT: ABNORMAL
MODE: ABNORMAL
MONOCYTES # BLD AUTO: 0.9 K/UL (ref 0.3–1)
MONOCYTES NFR BLD: 6.6 % (ref 4–15)
NEUTROPHILS # BLD AUTO: 9.7 K/UL (ref 1.8–7.7)
NEUTROPHILS NFR BLD: 71.4 % (ref 38–73)
NITRITE UR QL STRIP: NEGATIVE
NRBC BLD-RTO: 0 /100 WBC
OB PNL STL: NEGATIVE
PCO2 BLDA: 55.9 MMHG (ref 35–45)
PH SMN: 7.37 [PH] (ref 7.35–7.45)
PH UR STRIP: 6 [PH] (ref 5–8)
PHOSPHATE SERPL-MCNC: 2.5 MG/DL (ref 2.7–4.5)
PLATELET # BLD AUTO: 321 K/UL (ref 150–350)
PMV BLD AUTO: 11.4 FL (ref 9.2–12.9)
PO2 BLDA: 20 MMHG (ref 40–60)
POC BE: 7 MMOL/L
POC SATURATED O2: 28 % (ref 95–100)
POC TCO2: 32 MMOL/L (ref 24–29)
POC TCO2: 34 MMOL/L (ref 24–29)
POCT GLUCOSE: 209 MG/DL (ref 70–110)
POCT GLUCOSE: 210 MG/DL (ref 70–110)
POTASSIUM SERPL-SCNC: 4.5 MMOL/L (ref 3.5–5.1)
PROT SERPL-MCNC: 8.5 G/DL (ref 6–8.4)
PROT UR QL STRIP: ABNORMAL
PROTHROMBIN TIME: 11.4 SEC (ref 9–12.5)
RBC # BLD AUTO: 2.77 M/UL (ref 4–5.4)
RBC #/AREA URNS AUTO: 50 /HPF (ref 0–4)
SAMPLE: ABNORMAL
SAMPLE: ABNORMAL
SITE: ABNORMAL
SODIUM SERPL-SCNC: 142 MMOL/L (ref 136–145)
SP GR UR STRIP: 1.01 (ref 1–1.03)
SQUAMOUS #/AREA URNS AUTO: 0 /HPF
TROPONIN I SERPL DL<=0.01 NG/ML-MCNC: 1.53 NG/ML (ref 0–0.03)
TROPONIN I SERPL DL<=0.01 NG/ML-MCNC: 2.27 NG/ML (ref 0–0.03)
TROPONIN I SERPL DL<=0.01 NG/ML-MCNC: 5.44 NG/ML (ref 0–0.03)
URN SPEC COLLECT METH UR: ABNORMAL
WBC # BLD AUTO: 13.54 K/UL (ref 3.9–12.7)
WBC #/AREA URNS AUTO: 27 /HPF (ref 0–5)
WBC CLUMPS UR QL AUTO: ABNORMAL
YEAST UR QL AUTO: ABNORMAL

## 2019-06-06 PROCEDURE — 84484 ASSAY OF TROPONIN QUANT: CPT | Mod: 91

## 2019-06-06 PROCEDURE — 63600175 PHARM REV CODE 636 W HCPCS

## 2019-06-06 PROCEDURE — 87186 SC STD MICRODIL/AGAR DIL: CPT

## 2019-06-06 PROCEDURE — 96375 TX/PRO/DX INJ NEW DRUG ADDON: CPT

## 2019-06-06 PROCEDURE — 63600175 PHARM REV CODE 636 W HCPCS: Performed by: STUDENT IN AN ORGANIZED HEALTH CARE EDUCATION/TRAINING PROGRAM

## 2019-06-06 PROCEDURE — 82803 BLOOD GASES ANY COMBINATION: CPT

## 2019-06-06 PROCEDURE — 93005 ELECTROCARDIOGRAM TRACING: CPT

## 2019-06-06 PROCEDURE — 96365 THER/PROPH/DIAG IV INF INIT: CPT

## 2019-06-06 PROCEDURE — 99900035 HC TECH TIME PER 15 MIN (STAT)

## 2019-06-06 PROCEDURE — A4216 STERILE WATER/SALINE, 10 ML: HCPCS | Performed by: NURSE PRACTITIONER

## 2019-06-06 PROCEDURE — 87205 SMEAR GRAM STAIN: CPT

## 2019-06-06 PROCEDURE — 80053 COMPREHEN METABOLIC PANEL: CPT

## 2019-06-06 PROCEDURE — 99291 CRITICAL CARE FIRST HOUR: CPT | Mod: ,,, | Performed by: NURSE PRACTITIONER

## 2019-06-06 PROCEDURE — 99291 PR CRITICAL CARE, E/M 30-74 MINUTES: ICD-10-PCS | Mod: ,,, | Performed by: EMERGENCY MEDICINE

## 2019-06-06 PROCEDURE — 96366 THER/PROPH/DIAG IV INF ADDON: CPT

## 2019-06-06 PROCEDURE — 20000000 HC ICU ROOM

## 2019-06-06 PROCEDURE — 83735 ASSAY OF MAGNESIUM: CPT

## 2019-06-06 PROCEDURE — 99900026 HC AIRWAY MAINTENANCE (STAT)

## 2019-06-06 PROCEDURE — 87070 CULTURE OTHR SPECIMN AEROBIC: CPT

## 2019-06-06 PROCEDURE — 25000242 PHARM REV CODE 250 ALT 637 W/ HCPCS: Performed by: STUDENT IN AN ORGANIZED HEALTH CARE EDUCATION/TRAINING PROGRAM

## 2019-06-06 PROCEDURE — 83605 ASSAY OF LACTIC ACID: CPT

## 2019-06-06 PROCEDURE — 25000003 PHARM REV CODE 250: Performed by: INTERNAL MEDICINE

## 2019-06-06 PROCEDURE — 99292 PR CRITICAL CARE, ADDL 30 MIN: ICD-10-PCS | Mod: ,,, | Performed by: INTERNAL MEDICINE

## 2019-06-06 PROCEDURE — 83690 ASSAY OF LIPASE: CPT

## 2019-06-06 PROCEDURE — 87086 URINE CULTURE/COLONY COUNT: CPT

## 2019-06-06 PROCEDURE — 83880 ASSAY OF NATRIURETIC PEPTIDE: CPT

## 2019-06-06 PROCEDURE — 84100 ASSAY OF PHOSPHORUS: CPT

## 2019-06-06 PROCEDURE — 81001 URINALYSIS AUTO W/SCOPE: CPT

## 2019-06-06 PROCEDURE — 93010 EKG 12-LEAD: ICD-10-PCS | Mod: ,,, | Performed by: INTERNAL MEDICINE

## 2019-06-06 PROCEDURE — 82272 OCCULT BLD FECES 1-3 TESTS: CPT

## 2019-06-06 PROCEDURE — 81025 URINE PREGNANCY TEST: CPT | Performed by: EMERGENCY MEDICINE

## 2019-06-06 PROCEDURE — 71045 XR CHEST 1 VIEW: ICD-10-PCS | Mod: 26,76,, | Performed by: RADIOLOGY

## 2019-06-06 PROCEDURE — 25000242 PHARM REV CODE 250 ALT 637 W/ HCPCS: Performed by: NURSE PRACTITIONER

## 2019-06-06 PROCEDURE — 85025 COMPLETE CBC W/AUTO DIFF WBC: CPT

## 2019-06-06 PROCEDURE — 93010 ELECTROCARDIOGRAM REPORT: CPT | Mod: ,,, | Performed by: INTERNAL MEDICINE

## 2019-06-06 PROCEDURE — 25000003 PHARM REV CODE 250

## 2019-06-06 PROCEDURE — 63600175 PHARM REV CODE 636 W HCPCS: Performed by: NURSE PRACTITIONER

## 2019-06-06 PROCEDURE — 25000003 PHARM REV CODE 250: Performed by: STUDENT IN AN ORGANIZED HEALTH CARE EDUCATION/TRAINING PROGRAM

## 2019-06-06 PROCEDURE — 94640 AIRWAY INHALATION TREATMENT: CPT

## 2019-06-06 PROCEDURE — 99291 CRITICAL CARE FIRST HOUR: CPT | Mod: ,,, | Performed by: EMERGENCY MEDICINE

## 2019-06-06 PROCEDURE — 99292 CRITICAL CARE ADDL 30 MIN: CPT | Mod: ,,, | Performed by: INTERNAL MEDICINE

## 2019-06-06 PROCEDURE — 87040 BLOOD CULTURE FOR BACTERIA: CPT | Mod: 59

## 2019-06-06 PROCEDURE — 85610 PROTHROMBIN TIME: CPT

## 2019-06-06 PROCEDURE — 71045 X-RAY EXAM CHEST 1 VIEW: CPT | Mod: 26,76,, | Performed by: RADIOLOGY

## 2019-06-06 PROCEDURE — 94761 N-INVAS EAR/PLS OXIMETRY MLT: CPT

## 2019-06-06 PROCEDURE — 99291 CRITICAL CARE FIRST HOUR: CPT | Mod: 25

## 2019-06-06 PROCEDURE — 94002 VENT MGMT INPAT INIT DAY: CPT

## 2019-06-06 PROCEDURE — 25000003 PHARM REV CODE 250: Performed by: NURSE PRACTITIONER

## 2019-06-06 PROCEDURE — 99291 PR CRITICAL CARE, E/M 30-74 MINUTES: ICD-10-PCS | Mod: ,,, | Performed by: NURSE PRACTITIONER

## 2019-06-06 PROCEDURE — 87077 CULTURE AEROBIC IDENTIFY: CPT

## 2019-06-06 RX ORDER — FENTANYL CITRATE 50 UG/ML
50 INJECTION, SOLUTION INTRAMUSCULAR; INTRAVENOUS
Status: DISCONTINUED | OUTPATIENT
Start: 2019-06-06 | End: 2019-06-06

## 2019-06-06 RX ORDER — CEFEPIME HYDROCHLORIDE 1 G/1
1 INJECTION, POWDER, FOR SOLUTION INTRAMUSCULAR; INTRAVENOUS
Status: DISCONTINUED | OUTPATIENT
Start: 2019-06-07 | End: 2019-06-08

## 2019-06-06 RX ORDER — ACETAMINOPHEN 325 MG/1
650 TABLET ORAL EVERY 6 HOURS PRN
Status: DISCONTINUED | OUTPATIENT
Start: 2019-06-06 | End: 2019-06-07

## 2019-06-06 RX ORDER — NOREPINEPHRINE BITARTRATE/D5W 4MG/250ML
0.02 PLASTIC BAG, INJECTION (ML) INTRAVENOUS CONTINUOUS
Status: DISCONTINUED | OUTPATIENT
Start: 2019-06-06 | End: 2019-06-07

## 2019-06-06 RX ORDER — CHLORHEXIDINE GLUCONATE ORAL RINSE 1.2 MG/ML
15 SOLUTION DENTAL 2 TIMES DAILY
Status: DISCONTINUED | OUTPATIENT
Start: 2019-06-06 | End: 2019-06-12 | Stop reason: HOSPADM

## 2019-06-06 RX ORDER — ASPIRIN 325 MG
325 TABLET ORAL DAILY
Status: DISCONTINUED | OUTPATIENT
Start: 2019-06-06 | End: 2019-06-06

## 2019-06-06 RX ORDER — SCOLOPAMINE TRANSDERMAL SYSTEM 1 MG/1
1 PATCH, EXTENDED RELEASE TRANSDERMAL
Status: DISCONTINUED | OUTPATIENT
Start: 2019-06-06 | End: 2019-06-07

## 2019-06-06 RX ORDER — PHENYLEPHRINE HYDROCHLORIDE 10 MG/ML
100 INJECTION INTRAVENOUS ONCE AS NEEDED
Status: DISCONTINUED | OUTPATIENT
Start: 2019-06-06 | End: 2019-06-07

## 2019-06-06 RX ORDER — FENTANYL CITRATE 50 UG/ML
50 INJECTION, SOLUTION INTRAMUSCULAR; INTRAVENOUS
Status: DISCONTINUED | OUTPATIENT
Start: 2019-06-08 | End: 2019-06-12 | Stop reason: HOSPADM

## 2019-06-06 RX ORDER — FUROSEMIDE 10 MG/ML
60 INJECTION INTRAMUSCULAR; INTRAVENOUS ONCE
Status: COMPLETED | OUTPATIENT
Start: 2019-06-06 | End: 2019-06-06

## 2019-06-06 RX ORDER — VANCOMYCIN 2 GRAM/500 ML IN 0.9 % SODIUM CHLORIDE INTRAVENOUS
2000
Status: COMPLETED | OUTPATIENT
Start: 2019-06-06 | End: 2019-06-06

## 2019-06-06 RX ORDER — ASPIRIN 325 MG
325 TABLET ORAL DAILY
Status: DISCONTINUED | OUTPATIENT
Start: 2019-06-07 | End: 2019-06-08

## 2019-06-06 RX ORDER — VANCOMYCIN HCL IN 5 % DEXTROSE 1G/250ML
1000 PLASTIC BAG, INJECTION (ML) INTRAVENOUS
Status: DISCONTINUED | OUTPATIENT
Start: 2019-06-06 | End: 2019-06-09

## 2019-06-06 RX ORDER — INSULIN ASPART 100 [IU]/ML
1-10 INJECTION, SOLUTION INTRAVENOUS; SUBCUTANEOUS EVERY 6 HOURS PRN
Status: DISCONTINUED | OUTPATIENT
Start: 2019-06-06 | End: 2019-06-12 | Stop reason: HOSPADM

## 2019-06-06 RX ORDER — IPRATROPIUM BROMIDE AND ALBUTEROL SULFATE 2.5; .5 MG/3ML; MG/3ML
3 SOLUTION RESPIRATORY (INHALATION) EVERY 4 HOURS PRN
Status: DISCONTINUED | OUTPATIENT
Start: 2019-06-06 | End: 2019-06-12 | Stop reason: HOSPADM

## 2019-06-06 RX ORDER — FENTANYL CITRATE 50 UG/ML
50 INJECTION, SOLUTION INTRAMUSCULAR; INTRAVENOUS
Status: DISPENSED | OUTPATIENT
Start: 2019-06-06 | End: 2019-06-07

## 2019-06-06 RX ORDER — CEFEPIME HYDROCHLORIDE 2 G/1
2 INJECTION, POWDER, FOR SOLUTION INTRAVENOUS
Status: COMPLETED | OUTPATIENT
Start: 2019-06-06 | End: 2019-06-06

## 2019-06-06 RX ORDER — FAMOTIDINE 10 MG/ML
20 INJECTION INTRAVENOUS DAILY
Status: DISCONTINUED | OUTPATIENT
Start: 2019-06-07 | End: 2019-06-07

## 2019-06-06 RX ORDER — FUROSEMIDE 10 MG/ML
INJECTION INTRAMUSCULAR; INTRAVENOUS
Status: DISPENSED
Start: 2019-06-06 | End: 2019-06-07

## 2019-06-06 RX ORDER — FENTANYL CITRATE 50 UG/ML
INJECTION, SOLUTION INTRAMUSCULAR; INTRAVENOUS
Status: COMPLETED
Start: 2019-06-06 | End: 2019-06-06

## 2019-06-06 RX ORDER — PHENYLEPHRINE HCL IN 0.9% NACL 1 MG/10 ML
SYRINGE (ML) INTRAVENOUS
Status: COMPLETED
Start: 2019-06-06 | End: 2019-06-06

## 2019-06-06 RX ORDER — PHENYLEPHRINE HYDROCHLORIDE 10 MG/ML
INJECTION INTRAVENOUS
Status: DISPENSED
Start: 2019-06-06 | End: 2019-06-07

## 2019-06-06 RX ORDER — FENTANYL CITRATE 50 UG/ML
50 INJECTION, SOLUTION INTRAMUSCULAR; INTRAVENOUS ONCE
Status: COMPLETED | OUTPATIENT
Start: 2019-06-06 | End: 2019-06-06

## 2019-06-06 RX ORDER — DEXMEDETOMIDINE HYDROCHLORIDE 4 UG/ML
0.2 INJECTION, SOLUTION INTRAVENOUS CONTINUOUS
Status: DISCONTINUED | OUTPATIENT
Start: 2019-06-06 | End: 2019-06-07

## 2019-06-06 RX ORDER — ACETAMINOPHEN 325 MG/1
650 TABLET ORAL EVERY 6 HOURS PRN
Status: DISCONTINUED | OUTPATIENT
Start: 2019-06-06 | End: 2019-06-06

## 2019-06-06 RX ORDER — DEXMEDETOMIDINE HYDROCHLORIDE 4 UG/ML
INJECTION, SOLUTION INTRAVENOUS
Status: DISPENSED
Start: 2019-06-06 | End: 2019-06-07

## 2019-06-06 RX ORDER — SODIUM CHLORIDE 0.9 % (FLUSH) 0.9 %
3 SYRINGE (ML) INJECTION EVERY 8 HOURS
Status: DISCONTINUED | OUTPATIENT
Start: 2019-06-06 | End: 2019-06-12 | Stop reason: HOSPADM

## 2019-06-06 RX ORDER — NOREPINEPHRINE BITARTRATE/D5W 4MG/250ML
PLASTIC BAG, INJECTION (ML) INTRAVENOUS
Status: COMPLETED
Start: 2019-06-06 | End: 2019-06-06

## 2019-06-06 RX ORDER — GLUCAGON 1 MG
1 KIT INJECTION
Status: DISCONTINUED | OUTPATIENT
Start: 2019-06-06 | End: 2019-06-12 | Stop reason: HOSPADM

## 2019-06-06 RX ORDER — IPRATROPIUM BROMIDE AND ALBUTEROL SULFATE 2.5; .5 MG/3ML; MG/3ML
3 SOLUTION RESPIRATORY (INHALATION)
Status: COMPLETED | OUTPATIENT
Start: 2019-06-06 | End: 2019-06-06

## 2019-06-06 RX ORDER — ONDANSETRON 2 MG/ML
INJECTION INTRAMUSCULAR; INTRAVENOUS
Status: COMPLETED
Start: 2019-06-06 | End: 2019-06-07

## 2019-06-06 RX ADMIN — ACETAMINOPHEN 650 MG: 325 TABLET ORAL at 07:06

## 2019-06-06 RX ADMIN — SODIUM CHLORIDE 300 MG: 9 INJECTION, SOLUTION INTRAVENOUS at 10:06

## 2019-06-06 RX ADMIN — DEXMEDETOMIDINE HYDROCHLORIDE 0.6 MCG/KG/HR: 400 INJECTION INTRAVENOUS at 04:06

## 2019-06-06 RX ADMIN — FENTANYL CITRATE 50 MCG: 50 INJECTION INTRAMUSCULAR; INTRAVENOUS at 05:06

## 2019-06-06 RX ADMIN — Medication 3 ML: at 10:06

## 2019-06-06 RX ADMIN — FUROSEMIDE 60 MG: 10 INJECTION, SOLUTION INTRAMUSCULAR; INTRAVENOUS at 04:06

## 2019-06-06 RX ADMIN — CEFEPIME 2 G: 2 INJECTION, POWDER, FOR SOLUTION INTRAVENOUS at 02:06

## 2019-06-06 RX ADMIN — Medication 0.05 MCG/KG/MIN: at 08:06

## 2019-06-06 RX ADMIN — IPRATROPIUM BROMIDE AND ALBUTEROL SULFATE 3 ML: .5; 3 SOLUTION RESPIRATORY (INHALATION) at 02:06

## 2019-06-06 RX ADMIN — VANCOMYCIN HYDROCHLORIDE 2000 MG: 100 INJECTION, POWDER, LYOPHILIZED, FOR SOLUTION INTRAVENOUS at 02:06

## 2019-06-06 RX ADMIN — ASPIRIN 325 MG ORAL TABLET 325 MG: 325 PILL ORAL at 07:06

## 2019-06-06 RX ADMIN — Medication 1 MG: at 06:06

## 2019-06-06 RX ADMIN — IPRATROPIUM BROMIDE AND ALBUTEROL SULFATE 3 ML: .5; 3 SOLUTION RESPIRATORY (INHALATION) at 05:06

## 2019-06-06 RX ADMIN — FENTANYL CITRATE 100 MCG: 50 INJECTION INTRAMUSCULAR; INTRAVENOUS at 05:06

## 2019-06-06 RX ADMIN — INSULIN ASPART 4 UNITS: 100 INJECTION, SOLUTION INTRAVENOUS; SUBCUTANEOUS at 08:06

## 2019-06-06 RX ADMIN — CHLORHEXIDINE GLUCONATE 0.12% ORAL RINSE 15 ML: 1.2 LIQUID ORAL at 08:06

## 2019-06-06 NOTE — PT/OT/SLP DISCHARGE
Physical Therapy Discharge Summary    Name: Mariann Huff  MRN: [id number]   Principal Problem: Ureteral transection of left native kidney     Patient Discharged from acute Physical Therapy on 19.  Please refer to prior PT noted date on 19 for functional status.     Assessment:     Goals partially met.    Objective:     GOALS:   Multidisciplinary Problems     Physical Therapy Goals        Problem: Physical Therapy Goal    Goal Priority Disciplines Outcome Goal Variances Interventions   Physical Therapy Goal     PT, PT/OT Ongoing (interventions implemented as appropriate)     Description:  Goals to be met by:19    Patient will increase functional independence with mobility by performin. Supine to sit with Moderate Assistance - met  Revised goal: supine to sit through sidelying with minimal assist- met   2. Sit to stand transfer with Minimal Assistance - met   3. Gait  x 30 feet with Contact Guard Assistance using Rolling Walker. - met   4. Lower extremity exercise program x15 reps , with assistance as needed- met   5. Pt supervision bed mobility- not met  6. Pt supervision sit to /from stand - not met  7. Pt receive gait training ~ 100 ft with RW and CGA - not met                          Reasons for Discontinuation of Therapy Services  Transfer to alternate level of care.      Plan:     Patient Discharged to: Inpatient Rehab.    Cathy Taylor, PT  2019

## 2019-06-06 NOTE — ASSESSMENT & PLAN NOTE
--suspect secondary to prolonged hospitalization with critical illness and recent rectal bleeding.  --hb trend is grossly unchanged.  --follow up occult stool given concern for melena  --trend CBC, transfuse for hb 7

## 2019-06-06 NOTE — HPI
Mariann Huff is a 58 y/o female with history of Asthma, HTN, HLD,  CAD (LAD GINGER proximal and distal 2013 and GINGER ostial LAD 2/2014), HFpEF, NSTEMI, T2DM, Obesity, Sleep Apnea, and back pain who presented to the ED on 6/6/19 with acute shortness of breath started last night.      Mrs. Huff has had two recent hospitalizations. 4/12/19-4/14/19 for L5-S1 OLIF with Neurosurgery with intraoperaative left ureteral injury with ureteroureteral anastomosis and ureteral stent placement. Second admission, 4/20/19-6/5/19 for intraabdominal abscess s/p washout and ligation of left ureter with nephrostomy tube placement on 4/21/19 and tracheostomy and PEG placement on 5/2/19.  Most recent hospitalization complicated by BUE and RLE DVT s/p IVC filter on 5/29/19 given concern for GIB with workup revealing rectal ulcer requiring pRBC transfusion.  She was followed by ID during admission and is being treated with long term antibiotic therapy for E coli and Staph lugdenesis bacteremia with IV cefazolin and rifampin with end dated 6/18/19 given hardware.  She also completed 7 day course of cefepime for HAP (pseudomonas) on 5/25/19 and candida glabrata UTI that she received 7 days of high dose fluconazole.   She was discharged to rehab.    In the ED, she was started empirically on vancomycin and cefepime for possible PNA, CTA chest ordered for possible PE.  Labs remarkable for BNP 1403, lactate wnl, wbc 13 K, and sCr 1.4 ( from 0.8).  She was given a duo neb and placed on PSV. She is being admitted to the ICU with critical care medicine for further workup and evaluation.

## 2019-06-06 NOTE — ED PROVIDER NOTES
Encounter Date: 6/6/2019    SCRIBE #1 NOTE: I, Son Madisyn, am scribing for, and in the presence of,  Dr. Antoine . I have scribed the following portions of the note - the Resident attestation.       History     Chief Complaint   Patient presents with    Fever     Fever lastnight 104 and o2 sats began to drop this morning low 80s. L5-s1 fusion surgery 1 month ago.      HPI  58yo female with DM, HTN, HLD, CAD, and hx of ureteral transection during L5-S1 interbody fusion with severe infectious complications and prolonged hospital course requiring tracheostomy and PEG tube presenting with fever and shortness of breath.  Patient transferred from inpatient rehab today after abrupt onset shortness of breath beginning around 0800 this morning.  She initially underwent work-up at the rehab facility but was transferred after she began to have desaturations.  She current complains of shortness of breath.  Nursing at the rehab facility reports fever Tmax 104 today that responded to tylenol.      Review of patient's allergies indicates:  No Known Allergies  Past Medical History:   Diagnosis Date    Anticoagulant long-term use     Asthma     Back pain     Bradycardia, unspecified 5/8/2019    The etiology of the bradycardic episode is unclear.  The have appear to be respiratory in origin (at least the 1st episode).  We will continue to monitor carefully.  We are awaiting evaluation by Cardiology.      CAD (coronary artery disease)     s/p stentimg 2003 (2),2009 (1)    Carotid artery stenosis     Diabetes mellitus type 2 in obese     HTN (hypertension), benign     Hyperlipidemia     Keloid cicatrix     NPDR (nonproliferative diabetic retinopathy) 8/17/2015    NSTEMI (non-ST elevated myocardial infarction)     Nuclear sclerosis - Right Eye 3/18/2014    Primary localized osteoarthrosis, lower leg 6/18/2014    Sleep apnea      Past Surgical History:   Procedure Laterality Date    BRONCHOSCOPY N/A 5/2/2019    Performed  by Sean Ruano MD at [facility] OR 2ND FLR    CARDIAC CATHETERIZATION      cataract extraction left eye      cataracts      CATHETERIZATION, HEART, LEFT Left 2014    Performed by Wilman Kim MD at [facility] CATH LAB     SECTION, LOW TRANSVERSE      COLONOSCOPY N/A 2019    Performed by Al Alaniz MD at [facility] ENDO (2ND FLR)    CORONARY ANGIOPLASTY      Creation, Nephrostomy, Percutaneous Left 2019    Performed by Karina Surgeon at [facility] KARINA    CREATION, TRACHEOSTOMY N/A 2019    Performed by Sean [facility]ano MD at [facility] OR 2ND FLR    EGD, WITH PEG TUBE INSERTION  2019    Performed by Sean Ruano MD at [facility] OR 2ND FLR    ESOPHAGOGASTRODUODENOSCOPY (EGD) N/A 2016    Performed by Gardenia Adamson MD at [facility] ENDO (4TH FLR)    EXCISION TURBINATE, SUBMUCOUS      FUSION, SPINE, LUMBAR, ANTERIOR APPROACH Left L5-S1 Anterior to Psoa Interbody Fusion, L5-S1 Posterior Instrumentation Left 2019    Performed by Mk George MD at [facility] OR 2ND FLR    HAND SURGERY Left     HAND SURGERY Right     torn ligament repair/ Dr. Yeboah    HYSTERECTOMY      Injection,steroid,epidural,transforaminal approach - Bilateral - S1 Bilateral 2018    Performed by Tl [facility]breu [facility] at [facility] PAIN MGT    Injection-steroid-epidural-caudal N/A 2019    Performed by Dave Bentley Jr., MD at [facility] PAIN MGT    INSERTION-INTRAOCULAR LENS (IOL) Right 2015    Performed by Good Domingo MD at [facility] OR 1ST FLR    LAPAROTOMY, EXPLORATORY; LIGATION OF LEFT URETER; DOUBLE J STENT REMOVAL LEFT URETER Left 2019    Performed by Paul Carlson MD at [facility] OR 2ND FLR    left foot surgery      left wrist surgery      NASAL SEPTUM SURGERY  5/7/15    PHACOEMULSIFICATION-ASPIRATION-CATARACT Right 2015    Performed by Good Domingo MD at [facility] OR 1ST FLR    REPAIR, URETER  2019    Performed by Mk George MD at [facility] OR 2ND FLR     RESECTION-TURBINATES (SMR) N/A 5/7/2015    Performed by Dileep Dubois III, MD at [facility] OR 2ND FLR    rt elbow surgery      S/P LAD COATED STENT  05/14/2010    6 total     S/P OM1 STENT  08/2003    SEPTOPLASTY N/A 5/7/2015    Performed by Dileep Dubois III, MD at [facility] OR 2ND FLR    SIGMOIDOSCOPY, FLEXIBLE N/A 5/21/2019    Performed by ALBERTO Amin MD at [facility] ENDO (2ND FLR)    SIGMOIDOSCOPY, FLEXIBLE N/A 5/13/2019    Performed by ALBERTO Amin MD at [facility] ENDO (2ND FLR)    SINUS SURGERY      F.E.S.S.    SINUS SURGERY FUNCTIONAL ENDOSCOPIC WITH NAVIGATION WITH MAXILLARIES, ETHMOIDS, LEFT SPHENOID, LEFT LOLY N/A 5/7/2015    Performed by Dileep Dubois III, MD at [facility] OR 2ND FLR    STENT, URETERAL placement  4/12/2019    Performed by Robin Boyd MD at [facility] OR 2ND FLR    TUBAL LIGATION       Family History   Problem Relation Age of Onset    Diabetes Mother     Heart disease Mother     Diabetes Father     Leukemia Father         leukemia    Heart attack Father     Diabetes Sister     Diabetes Brother     Diabetes Sister     No Known Problems Sister     No Known Problems Brother     No Known Problems Brother     No Known Problems Maternal Grandmother     No Known Problems Maternal Grandfather     No Known Problems Paternal Grandmother     No Known Problems Paternal Grandfather     No Known Problems Son     No Known Problems Son     No Known Problems Maternal Aunt     No Known Problems Maternal Uncle     No Known Problems Paternal Aunt     No Known Problems Paternal Uncle     Colon cancer Neg Hx     Inflammatory bowel disease Neg Hx     Melanoma Neg Hx     Psoriasis Neg Hx     Lupus Neg Hx     Eczema Neg Hx     Acne Neg Hx     Amblyopia Neg Hx     Blindness Neg Hx     Cancer Neg Hx     Cataracts Neg Hx     Glaucoma Neg Hx     Hypertension Neg Hx     Macular degeneration Neg Hx     Retinal detachment Neg Hx     Strabismus Neg Hx     Stroke Neg Hx     Thyroid  disease Neg Hx     Heart failure Neg Hx     Hyperlipidemia Neg Hx      Social History     Tobacco Use    Smoking status: Never Smoker    Smokeless tobacco: Never Used   Substance Use Topics    Alcohol use: No     Alcohol/week: 0.0 oz    Drug use: No     Review of Systems   Unable to perform ROS: Acuity of condition   Patient severely short of breath and unable to answer many questions.    Physical Exam     Initial Vitals [06/06/19 1306]   BP Pulse Resp Temp SpO2   (!) 145/78 (!) 114 (!) 22 98.5 °F (36.9 °C) (!) 92 %      MAP       --         Physical Exam    Nursing note and vitals reviewed.  Constitutional: She appears well-developed and well-nourished. She is not diaphoretic. She appears distressed.   Uncomfortable appearing  female in respiratory distress.   HENT:   Head: Normocephalic and atraumatic.   Mouth/Throat: No oropharyngeal exudate.   Tacky mucous membranes   Eyes: Conjunctivae and EOM are normal. Pupils are equal, round, and reactive to light. No scleral icterus.   Neck: Normal range of motion. Neck supple.   Tracheostomy in place.  No stridor or audible obstruction.  Tracheostomy site clean, dry, and intact without bleeding or drainage.     Cardiovascular: Regular rhythm, S1 normal, S2 normal, normal heart sounds, intact distal pulses and normal pulses. Tachycardia present.  Exam reveals no gallop and no friction rub.    No murmur heard.  Pulmonary/Chest: Accessory muscle usage present. Tachypnea noted. She is in respiratory distress. She has wheezes in the right lower field and the left lower field. She has rhonchi.   Abdominal: Soft. Bowel sounds are normal. She exhibits no distension and no mass. There is no tenderness. There is no guarding.   Musculoskeletal: Normal range of motion. She exhibits no edema or tenderness.   Neurological: She is alert and oriented to person, place, and time. GCS score is 15. GCS eye subscore is 4. GCS verbal subscore is 5. GCS motor subscore is  6.   Skin: Skin is warm and dry. No rash noted. No erythema. No pallor.   PICC line in left arm, no erythema, induration, or fluctuance on evaluation.   Psychiatric: She has a normal mood and affect. Her behavior is normal. Judgment and thought content normal.         ED Course   Critical Care  Date/Time: 6/6/2019 1:45 PM  Performed by: Tomasz Antoine MD  Authorized by: Tomasz Antoine MD   Direct patient critical care time: 15 minutes  Additional history critical care time: 10 minutes  Ordering / reviewing critical care time: 10 minutes  Documentation critical care time: 5 minutes  Consulting other physicians critical care time: 10 minutes  Consult with family critical care time: 5 minutes  Total critical care time (exclusive of procedural time) : 55 minutes  Critical care time was exclusive of teaching time.  Critical care was necessary to treat or prevent imminent or life-threatening deterioration of the following conditions: respiratory failure and sepsis.  Critical care was time spent personally by me on the following activities: development of treatment plan with patient or surrogate, discussions with consultants, interpretation of cardiac output measurements, evaluation of patient's response to treatment, examination of patient, obtaining history from patient or surrogate, ordering and performing treatments and interventions, ordering and review of laboratory studies, ordering and review of radiographic studies, pulse oximetry, re-evaluation of patient's condition, review of old charts and ventilator management.        Labs Reviewed   CBC W/ AUTO DIFFERENTIAL - Abnormal; Notable for the following components:       Result Value    WBC 13.54 (*)     RBC 2.77 (*)     Hemoglobin 7.8 (*)     Hematocrit 25.6 (*)     Mean Corpuscular Hemoglobin Conc 30.5 (*)     Immature Granulocytes 0.7 (*)     Gran # (ANC) 9.7 (*)     Immature Grans (Abs) 0.09 (*)     Lymph% 17.1 (*)     All other components within  normal limits   COMPREHENSIVE METABOLIC PANEL - Abnormal; Notable for the following components:    CO2 22 (*)     Glucose 285 (*)     BUN, Bld 48 (*)     Total Protein 8.5 (*)     Albumin 2.1 (*)     Alkaline Phosphatase 172 (*)     ALT 8 (*)     eGFR if  47.8 (*)     eGFR if non  41.4 (*)     All other components within normal limits    Narrative:     ADD-ON BNP #[id number] PER PAULO GREEN MD 14:16  06/06/2019    B-TYPE NATRIURETIC PEPTIDE - Abnormal; Notable for the following components:    BNP 1,403 (*)     All other components within normal limits    Narrative:     ADD-ON BNP #[id number] PER PAULO GREEN MD 14:16  06/06/2019    ISTAT PROCEDURE - Abnormal; Notable for the following components:    POC TCO2 32 (*)     All other components within normal limits   ISTAT PROCEDURE - Abnormal; Notable for the following components:    POC PCO2 55.9 (*)     POC PO2 20 (*)     POC HCO3 32.4 (*)     POC SATURATED O2 28 (*)     POC TCO2 34 (*)     All other components within normal limits   CULTURE, BLOOD   CULTURE, BLOOD   LACTIC ACID, PLASMA    Narrative:     ADD-ON BNP #[id number] PER PAULO GREEN MD 14:16  06/06/2019    PROTIME-INR   B-TYPE NATRIURETIC PEPTIDE   MAGNESIUM   PHOSPHORUS   URINALYSIS, REFLEX TO URINE CULTURE   POCT URINE PREGNANCY         HO-4 MDM  60yo female with DM, HTN, HLD, CAD, and hx of ureteral transection during L5-S1 interbody fusion with severe infectious complications and prolonged hospital course requiring tracheostomy and PEG tube presenting with fever and shortness of breath    DDx: sepsis, pulmonary embolism, MI, mucus plug, pneumonia, UTI    A/P: Based on history and physical exam, I suspect the patient is suffering from sepsis either from pulmonary or urinary etiology.    Severe Sepsis Inclusion Criteria  · Suspected Source of Infection: pulmonary, urinary  · SIRS Criteria   · Heart Rate >90/min: yes   · Respiratory Rate > 20/min: yes    · Temp >38.3 C or <36 C: no   · WBC >12,000 or <4,000 or >10% bands yes   · Organ Dysfunction  · BP: Sys <90 or MAP<65 or decrease in Sys>40 from last recorded pressure: no   · Cr > 2.0 or UOP < 0.5mL/kg/hr no   · Bili > 2mg/dL no   · Plt < 100,000 no   · INR >1.5 or aPTT >60sec no   · Lactate > 2mmol/L No     Based on the above parameters, the pt does not meet severe sepsis criteria but has concerning vital signs and history that put her at risk for an severe infection.  Will initiate empiric antibiotics with vancomycin and cefepime given her prolonged hospitalization.  Will obtain sepsis labs including blood cultures, urine cultures, and lactate.  Will not pursue 30mL/kg fluid bolus as the CXR shows evidence of pulmonary vascular congestion.  Other considerations include mucus plug but less suspicion as the patient's shortness of breath did not improve after suction of the tracheostomy.   Given the patient's history of bilateral DVT and abrupt onset shortness of breath, I am also concerned for PE as the cause of her shortness of breath.  Will obtain CT PE study.    Patient placed on ventilator with PEEP of 5 to aide work of breathing.  ICU consulted as she will need ICU admission for ventilatory management while awaiting results of septic workup and PE study.    Anthony Gil MD, PGY-4  [facility]/Pediatrics Resident  1:47 PM 06/06/2019    HO-4 update  Patient   Anthony Gil MD, PGY-4  3:28 PM 06/06/2019         Imaging Results          CTA Chest Non-Coronary - PE Study (In process)                X-Ray Chest AP Portable (Final result)  Result time 06/06/19 14:18:31    Final result by Christian Valencia MD (06/06/19 14:18:31)                 Impression:      See above      Electronically signed by: Christian Valencia MD  Date:    06/06/2019  Time:    14:18             Narrative:    EXAMINATION:  XR CHEST AP PORTABLE    CLINICAL HISTORY:  Sepsis;    TECHNIQUE:  Single frontal view of the chest was  performed.    COMPARISON:  Non 06/06/2019 e    FINDINGS:  Support devices remain.  Ill-defined patchy airspace consolidation and/or edema bilaterally identified without significant changes.  No significant pleural effusion.                                 Medical Decision Making:   History:   Old Medical Records: I decided to obtain old medical records.  Clinical Tests:   Lab Tests: Ordered and Reviewed  Radiological Study: Ordered and Reviewed  Medical Tests: Ordered and Reviewed  Other:   I have discussed this case with another health care provider.              Attending Attestation:   Physician Attestation Statement for Resident:  As the supervising MD   Physician Attestation Statement: I have personally seen and examined this patient.   I agree with the above history. -: 60 y/o woman presents from skilled nursing for evaluation of hypertension and hypoxia today.    As the supervising MD I agree with the above PE.    As the supervising MD I agree with the above treatment, course, plan, and disposition.                       Clinical Impression:       ICD-10-CM ICD-9-CM   1. At risk for sepsis Z91.89 V49.89   2. Tachycardia R00.0 785.0   3. Acute hypoxemic respiratory failure J96.01 518.81   4. Acute on chronic respiratory failure with hypoxia and hypercapnia J96.21 518.84    J96.22 786.09     799.02         Disposition:   Disposition: Admitted  Condition: Serious  MICU                        Anthony Gil MD  Resident  06/06/19 1632       Tomasz Antoine MD  06/10/19 1053

## 2019-06-06 NOTE — ASSESSMENT & PLAN NOTE
--rising troponin with EKG concerning for lateral t wave changes in the setting of known LAD stents.  Denies chest pain  --ASA  --consult cardiology  --trend troponin levels  --TTE

## 2019-06-06 NOTE — ASSESSMENT & PLAN NOTE
--DDX include pulmonary edema vs PE, vs PNA  --BNP 1400 with history of diastolic heart failure.  Chest xray with bilateral patchy airspace disease. Diurese as tolerated.   --known BUE and RLE thrombus with IVC filter in place given significant rectal bleeding when previously on heparin in May 2019. CTA chest ordered along with US BUE and BLE.  Concern for maroon stool; occult stool pending.  Discussed anticoagulation with  who expressed significant concern with starting anticoagulation given recent bleeding.  Will hold on initiating anticoagulation at this time.   --afebrile, leukocytosis with thick, yellow sputum.  Sputum and blood cultures ordered. UA sent.  On empiric antibiotics with cefepime and vancomycin.   History of Pseudomonas Aeruginosa in sputum which is sensitive to cefepime from sensitivities on 5/19/19.  --placed on mechanical ventilation, wean as tolerated.    --precedex infusion started for anxiety.  PRN fentanyl.

## 2019-06-06 NOTE — ASSESSMENT & PLAN NOTE
--cardiorenal vs ATN vs obstructive (? Migration of left nephrostomy tube)  --diuresing as tolerated   --making urine through both urine correa and L nephrostomy tube.  --CT abdomen/pelvis w/o contrast for evaluation of left nephrostomy tube placement  --monitor I&Os

## 2019-06-06 NOTE — CONSULTS
Please see ANAY CORTES, [facility]  Critical Care Medicine  [phone]

## 2019-06-06 NOTE — H&P
[facility]  Critical Care Medicine  History & Physical    Patient Name: Mariann Huff  MRN: [id number]  Admission Date: 6/6/2019  Hospital Length of Stay: 0 days  Code Status: Full Code  Attending Physician: Crystal Mejía MD   Primary Care Provider: Jasbir Haney MD   Principal Problem: Acute on chronic respiratory failure with hypoxia    Subjective:     HPI:  Mariann Huff is a 60 y/o female with history of Asthma, HTN, HLD,  CAD (LAD GINGER proximal and distal 2013 and GINGER ostial LAD 2/2014), HFpEF, NSTEMI, T2DM, Obesity, Sleep Apnea, and back pain who presented to the ED on 6/6/19 with acute shortness of breath that started last night.      Mrs. Huff has had two recent hospitalizations. 4/12/19-4/14/19 for L5-S1 OLIF with Neurosurgery with intraoperaative left ureteral injury with ureteroureteral anastomosis and ureteral stent placement. Second admission, 4/20/19-6/5/19 for intraabdominal abscess s/p washout and ligation of left ureter with nephrostomy tube placement on 4/21/19 and tracheostomy and PEG placement on 5/2/19.  Most recent hospitalization complicated by BUE and RLE DVT s/p IVC filter on 5/29/19 given concern rectal bleeding requiring pRBC transfusion with workup revealing rectal ulcer.  She was followed by ID during admission and is being treated with long term antibiotic therapy for E coli and Staph lugdenesis bacteremia with IV cefazolin and rifampin with end dated 6/18/19 given hardware.  She also completed 7 day course of cefepime for HAP (pseudomonas) on 5/25/19 and candida glabrata UTI that she received 7 days of high dose fluconazole.   She was discharged to rehab on 6/5/19.    In the ED, she was started empirically on vancomycin and cefepime for possible PNA, CTA chest ordered for possible PE.  Labs remarkable for BNP 1403, lactate wnl, wbc 13 K, and sCr 1.4 ( from 0.8).  She was given a duo neb and placed on PSV. She is being admitted to the ICU with critical care  medicine for further workup and evaluation.      Hospital/ICU Course:  No notes on file         Review of Systems   Reason unable to perform ROS: difficult to obtain given tracheostomy    Respiratory: Positive for shortness of breath.    Cardiovascular: Negative for chest pain and leg swelling.   Gastrointestinal: Positive for nausea (intermittently for the past week). Negative for abdominal pain and vomiting.   Genitourinary:        Chronic correa   Neurological: Negative for dizziness and headaches.     Objective:     Vital Signs (Most Recent):  Temp: 98.5 °F (36.9 °C) (06/06/19 1306)  Pulse: (!) 124 (06/06/19 1711)  Resp: (!) 43 (06/06/19 1711)  BP: (!) 168/83 (06/06/19 1704)  SpO2: 99 % (06/06/19 1711) Vital Signs (24h Range):  Temp:  [98.5 °F (36.9 °C)] 98.5 °F (36.9 °C)  Pulse:  [111-128] 124  Resp:  [22-43] 43  SpO2:  [92 %-100 %] 99 %  BP: (130-168)/(63-83) 168/83   Weight: 72.6 kg (160 lb)  Body mass index is 26.63 kg/m².    No intake or output data in the 24 hours ending 06/06/19 1817    Physical Exam   Constitutional: She appears well-developed. She has a sickly appearance. No distress.   HENT:   Head: Normocephalic.   Mouth/Throat: Oropharynx is clear and moist.   Eyes: Pupils are equal, round, and reactive to light. Right eye exhibits normal extraocular motion and no nystagmus. Left eye exhibits normal extraocular motion and no nystagmus.   Cardiovascular: Regular rhythm, normal heart sounds and intact distal pulses. Tachycardia present.   Warm extremities   Pulmonary/Chest: Tachypnea noted. She has wheezes (bilaterally). She has rales (bilaterally).   6 shiley tracheostomy.  Thick, yellow secretions   Abdominal: Soft. Bowel sounds are normal. There is no tenderness.       Genitourinary:   Genitourinary Comments: Left nephrostomy tube with clear, sanford output.  Urine catheter with clear, sanford output   Neurological: She is alert. No sensory deficit.   Alert, following request, mouthing words, moving all  extremities equally.    Skin: Skin is warm and dry. She is not diaphoretic.        Psychiatric: Her mood appears anxious.       Vents:  Vent Mode: Spont (06/06/19 1704)  Ventilator Initiated: Yes (06/06/19 1416)  Pressure Support: 12 cmH20 (06/06/19 1704)  PEEP/CPAP: 8 cmH20 (06/06/19 1704)  Oxygen Concentration (%): 100 (06/06/19 1704)  Peak Airway Pressure: 22 cmH2O (06/06/19 1704)  Plateau Pressure: 0 cmH20 (06/06/19 1704)  Total Ve: 10.5 mL (06/06/19 1704)  F/VT Ratio<105 (RSBI): 387.76 (06/06/19 1704)  Lines/Drains/Airways     Peripherally Inserted Central Catheter Line                 PICC Double Lumen 05/24/19 0900 right brachial 13 days          Drain                 Nephrostomy 04/21/19 0629 Left 8 Fr. 46 days         Gastrostomy/Enterostomy 05/02/19 1134 Percutaneous endoscopic gastrostomy (PEG) LUQ decompression;feeding 35 days         Urethral Catheter 05/29/19 0151 8 days          Airway                 Surgical Airway 06/03/19 1234 Shiley Cuffed 3 days          Peripheral Intravenous Line                 Peripheral IV - Single Lumen 06/06/19 1505 22 G Left Hand less than 1 day              Significant Labs:    CBC/Anemia Profile:  Recent Labs   Lab 06/06/19  1333   WBC 13.54*   HGB 7.8*   HCT 25.6*      MCV 92   RDW 13.9        Chemistries:  Recent Labs   Lab 06/06/19  1333      K 4.5      CO2 22*   BUN 48*   CREATININE 1.4   CALCIUM 9.7   ALBUMIN 2.1*   PROT 8.5*   BILITOT 0.7   ALKPHOS 172*   ALT 8*   AST 32   MG 2.2   PHOS 2.5*       All pertinent labs within the past 24 hours have been reviewed.    Significant Imaging:  I have reviewed and interpreted all pertinent imaging results/findings within the past 24 hours.    Assessment/Plan:     Pulmonary  * Acute on chronic respiratory failure with hypoxia  --DDX include pulmonary edema vs PE vs PNA  --BNP 1400 with history of diastolic heart failure.  Chest xray with bilateral patchy airspace disease. Diurese as tolerated.   --known  BUE and RLE thrombus with IVC filter in place given significant rectal bleeding when previously on heparin in May 2019. CTA chest ordered along with US BUE and BLE.  Concern for maroon stool; occult stool pending.  Discussed anticoagulation with  who expressed significant concern with starting anticoagulation given recent bleeding.  Will hold on initiating anticoagulation at this time.   --afebrile, leukocytosis with thick, yellow sputum.  Sputum and blood cultures ordered. UA sent.  On empiric antibiotics with cefepime and vancomycin.   History of Pseudomonas Aeruginosa in sputum which is sensitive to cefepime from sensitivities on 5/19/19.  --placed on mechanical ventilation, wean as tolerated.    --precedex infusion started for anxiety.  PRN fentanyl.     Cardiac/Vascular  (HFpEF) heart failure with preserved ejection fraction  --repeating TTE; concern for acute on chronic heart failure exacerbation  --diureses as tolerated    Essential hypertension  --holding antihypertensives (amlodipine and chlorthalidone). Brief episode of hypotension on precedex.   --diuresing with lasix.       CAD (coronary artery disease)  --rising troponin with EKG concerning for lateral t wave changes in the setting of known LAD stents.  Denies chest pain  --ASA  --consult cardiology  --trend troponin levels  --TTE    Renal/  PAPA (acute kidney injury)  --cardiorenal vs ATN vs obstructive (? Migration of left nephrostomy tube)  --diuresing as tolerated   --making urine through both urine correa and L nephrostomy tube.  --CT abdomen/pelvis w/o contrast for evaluation of left nephrostomy tube placement  --monitor I&Os    Oncology  Normocytic anemia  --suspect secondary to prolonged hospitalization with critical illness and recent rectal bleeding.  --hb trend is grossly unchanged.  --follow up occult stool given concern for melena  --trend CBC, transfuse for hb 7    Endocrine  Type 2 diabetes mellitus, with long-term current use of  insulin  --A1c 5.4.    --frequent glucose checks with SSI     Recent E coli (UTI/Bacteremia) and Staph lugdenesis (abdominal fluid) infections   --previous regimen IV cefazolin and rifampin with end dated 6/18/19 given hardware  --continue rifampin.    --ID consulted.   --chronic correa; changed upon admission  --PICC RUE placed 5/24/19. Remove if clinically worsens or evidence of bacteremia.      updated by Dr. Mejía and myself.    Discussed plan with Dr. Mejía      Critical Care Time: 50 minutes  Critical secondary to Patient has a condition that poses threat to life and bodily function: Acute on chronic hypoxemic respiratory failure     Critical care was time spent personally by me on the following activities: development of treatment plan with patient or surrogate and bedside caregivers, discussions with consultants, evaluation of patient's response to treatment, examination of patient, ordering and performing treatments and interventions, ordering and review of laboratory studies, ordering and review of radiographic studies, pulse oximetry, re-evaluation of patient's condition. This critical care time did not overlap with that of any other provider or involve time for any procedures.     Lisa George NP  Critical Care Medicine  [facility]

## 2019-06-06 NOTE — ED TRIAGE NOTES
Pt. Presents to ED today with ems from [facility] and c/o sob x1 day. Recently discharged from hospital. Trach suctioned many times at ecf without success, sats at 80s pta. Placed on nrb at 15L and sats up to 98%. Denies pain at this time. Gtube, nephro tube to left side, chronic correa x2 weeks.

## 2019-06-06 NOTE — PROGRESS NOTES
Pharmacokinetic Initial Assessment: IV Vancomycin    Assessment/Plan:  - Received vancomycin 2 g IV x1 dose while in ED. Follow this with vancomycin 1000 mg IV q24h.  - Trough scheduled to be collected 6/8 @1330 prior to 3rd dose, goal 15-20 mcg/mL    Pharmacy will continue to follow and monitor vancomycin.      Please contact pharmacy at extension [phone] with any questions regarding this assessment.     Thank you for the consult,   Kory Haney, PharmD, [facility]  Medical/Surgical ICU Clinical Pharmacist  Spectralink: [id number]     Patient brief summary:  Mariann Huff is a 59 y.o. female initiated on antimicrobial therapy with IV Vancomycin for treatment of suspected lower respiratory infection    Drug Allergies:   Review of patient's allergies indicates:  No Known Allergies    Actual Body Weight:   72.6 kg    Renal Function:   Estimated Creatinine Clearance: 43.7 mL/min (based on SCr of 1.4 mg/dL).,     Dialysis Method (if applicable):  N/A    CBC (last 72 hours):  Recent Labs   Lab Result Units 06/04/19  0451 06/06/19  1333   WBC K/uL 11.52 13.54*   Hemoglobin g/dL 8.3* 7.8*   Hematocrit % 27.4* 25.6*   Platelets K/uL 252 321   Gran% % 65.7 71.4   Lymph% % 16.8* 17.1*   Mono% % 9.8 6.6   Eosinophil% % 6.9 3.7   Basophil% % 0.5 0.5   Differential Method  Automated Automated       Metabolic Panel (last 72 hours):  Recent Labs   Lab Result Units 06/04/19  0451 06/06/19  1333   Sodium mmol/L 144 142   Potassium mmol/L 3.6 4.5   Chloride mmol/L 109 106   CO2 mmol/L 28 22*   Glucose mg/dL 141* 285*   BUN, Bld mg/dL 38* 48*   Creatinine mg/dL 0.8 1.4   Albumin g/dL  --  2.1*   Total Bilirubin mg/dL  --  0.7   Alkaline Phosphatase U/L  --  172*   AST U/L  --  32   ALT U/L  --  8*   Magnesium mg/dL  --  2.2   Phosphorus mg/dL  --  2.5*       Drug levels (last 3 results):  No results for input(s): VANCOMYCINRA, VANCOMYCINPE, VANCOMYCINTR in the last 72 hours.    Microbiologic Results:  Microbiology Results (last 7 days)      Procedure Component Value Units Date/Time    Culture, Respiratory with Gram Stain [[id number]]     Order Status:  No result Specimen:  Respiratory from Endotracheal Aspirate     Blood culture x two cultures. Draw prior to antibiotics. [[id number]] Collected:  06/06/19 1346    Order Status:  Sent Specimen:  Blood from Peripheral, Antecubital, Left Updated:  06/06/19 1400    Blood culture x two cultures. Draw prior to antibiotics. [[id number]] Collected:  06/06/19 1333    Order Status:  Sent Specimen:  Blood from Peripheral, Hand, Left Updated:  06/06/19 1358

## 2019-06-06 NOTE — SUBJECTIVE & OBJECTIVE
Review of Systems   Reason unable to perform ROS: difficult to obtain given tracheostomy    Respiratory: Positive for shortness of breath.    Cardiovascular: Negative for chest pain and leg swelling.   Gastrointestinal: Positive for nausea (intermittently for the past week). Negative for abdominal pain and vomiting.   Genitourinary:        Chronic correa   Neurological: Negative for dizziness and headaches.     Objective:     Vital Signs (Most Recent):  Temp: 98.5 °F (36.9 °C) (06/06/19 1306)  Pulse: (!) 124 (06/06/19 1711)  Resp: (!) 43 (06/06/19 1711)  BP: (!) 168/83 (06/06/19 1704)  SpO2: 99 % (06/06/19 1711) Vital Signs (24h Range):  Temp:  [98.5 °F (36.9 °C)] 98.5 °F (36.9 °C)  Pulse:  [111-128] 124  Resp:  [22-43] 43  SpO2:  [92 %-100 %] 99 %  BP: (130-168)/(63-83) 168/83   Weight: 72.6 kg (160 lb)  Body mass index is 26.63 kg/m².    No intake or output data in the 24 hours ending 06/06/19 1817    Physical Exam   Constitutional: She appears well-developed. She has a sickly appearance. No distress.   HENT:   Head: Normocephalic.   Mouth/Throat: Oropharynx is clear and moist.   Eyes: Pupils are equal, round, and reactive to light. Right eye exhibits normal extraocular motion and no nystagmus. Left eye exhibits normal extraocular motion and no nystagmus.   Cardiovascular: Regular rhythm, normal heart sounds and intact distal pulses. Tachycardia present.   Warm extremities   Pulmonary/Chest: Tachypnea noted. She has wheezes (bilaterally). She has rales (bilaterally).   6 shiley tracheostomy.  Thick, yellow secretions   Abdominal: Soft. Bowel sounds are normal. There is no tenderness.       Genitourinary:   Genitourinary Comments: Left nephrostomy tube with clear, sanford output.  Urine catheter with clear, sanford output   Neurological: She is alert. No sensory deficit.   Alert, following request, mouthing words, moving all extremities equally.    Skin: Skin is warm and dry. She is not diaphoretic.         Psychiatric: Her mood appears anxious.       Vents:  Vent Mode: Spont (06/06/19 1704)  Ventilator Initiated: Yes (06/06/19 1416)  Pressure Support: 12 cmH20 (06/06/19 1704)  PEEP/CPAP: 8 cmH20 (06/06/19 1704)  Oxygen Concentration (%): 100 (06/06/19 1704)  Peak Airway Pressure: 22 cmH2O (06/06/19 1704)  Plateau Pressure: 0 cmH20 (06/06/19 1704)  Total Ve: 10.5 mL (06/06/19 1704)  F/VT Ratio<105 (RSBI): 387.76 (06/06/19 1704)  Lines/Drains/Airways     Peripherally Inserted Central Catheter Line                 PICC Double Lumen 05/24/19 0900 right brachial 13 days          Drain                 Nephrostomy 04/21/19 0629 Left 8 Fr. 46 days         Gastrostomy/Enterostomy 05/02/19 1134 Percutaneous endoscopic gastrostomy (PEG) LUQ decompression;feeding 35 days         Urethral Catheter 05/29/19 0151 8 days          Airway                 Surgical Airway 06/03/19 1234 Shiley Cuffed 3 days          Peripheral Intravenous Line                 Peripheral IV - Single Lumen 06/06/19 1505 22 G Left Hand less than 1 day              Significant Labs:    CBC/Anemia Profile:  Recent Labs   Lab 06/06/19  1333   WBC 13.54*   HGB 7.8*   HCT 25.6*      MCV 92   RDW 13.9        Chemistries:  Recent Labs   Lab 06/06/19  1333      K 4.5      CO2 22*   BUN 48*   CREATININE 1.4   CALCIUM 9.7   ALBUMIN 2.1*   PROT 8.5*   BILITOT 0.7   ALKPHOS 172*   ALT 8*   AST 32   MG 2.2   PHOS 2.5*       All pertinent labs within the past 24 hours have been reviewed.    Significant Imaging:  I have reviewed and interpreted all pertinent imaging results/findings within the past 24 hours.

## 2019-06-06 NOTE — ED NOTES
"CC team made aware of inability to tolerate CT PE scan at this time. Sats dropped to 89% on vent, hr up to 136, states "could not breathe, help me". Pt. Taken back to room. Will continue to monitor frequently.   "

## 2019-06-06 NOTE — PT/OT/SLP DISCHARGE
Occupational Therapy Discharge Summary    Mariann Huff  MRN: [id number]   Principal Problem: Ureteral transection of left native kidney      Patient Discharged from acute Occupational Therapy on 6/5/19.  Please refer to prior OT note dated 6/3/19 for functional status.    Assessment:      Goals partially met.    Objective:     GOALS:   Multidisciplinary Problems     Occupational Therapy Goals        Problem: Occupational Therapy Goal    Goal Priority Disciplines Outcome Interventions   Occupational Therapy Goal     OT, PT/OT Ongoing (interventions implemented as appropriate)    Description:  Goals to be met by: 6/5/19     Patient will increase functional independence with ADLs by performing:    UE Dressing with Set-up Assistance.  LE Dressing with Maximum Assistance and Assistive Devices as needed.  Grooming while standing with Minimal Assistance.  Toileting from bedside commode with Minimal Assistance for hygiene and clothing management.   Sitting at edge of bed x10 minutes with Supervision.  Rolling to Bilateral with Minimal Assistance.   Supine to sit with Minimal Assistance.  Toilet transfer to bedside commode with Minimal Assistance.                       Reasons for Discontinuation of Therapy Services  Transfer to alternate level of care.      Plan:     Patient Discharged to: Inpatient Rehab    Ayesha Myers OT  6/6/2019

## 2019-06-07 PROBLEM — R23.9 ALTERATION IN SKIN INTEGRITY: Status: ACTIVE | Noted: 2019-06-07

## 2019-06-07 PROBLEM — R50.9 FEVER: Status: ACTIVE | Noted: 2019-06-07

## 2019-06-07 LAB
ABO + RH BLD: NORMAL
ALBUMIN SERPL BCP-MCNC: 1.6 G/DL (ref 3.5–5.2)
ALBUMIN SERPL BCP-MCNC: 1.7 G/DL (ref 3.5–5.2)
ALP SERPL-CCNC: 115 U/L (ref 55–135)
ALP SERPL-CCNC: 121 U/L (ref 55–135)
ALT SERPL W/O P-5'-P-CCNC: 6 U/L (ref 10–44)
ALT SERPL W/O P-5'-P-CCNC: 6 U/L (ref 10–44)
ANION GAP SERPL CALC-SCNC: 10 MMOL/L (ref 8–16)
ANION GAP SERPL CALC-SCNC: 10 MMOL/L (ref 8–16)
ANION GAP SERPL CALC-SCNC: 9 MMOL/L (ref 8–16)
AST SERPL-CCNC: 25 U/L (ref 10–40)
AST SERPL-CCNC: 29 U/L (ref 10–40)
AV INDEX (PROSTH): 0.79
AV MEAN GRADIENT: 2.71 MMHG
AV PEAK GRADIENT: 4.67 MMHG
AV VALVE AREA: 2.47 CM2
AV VELOCITY RATIO: 0.74
BASOPHILS # BLD AUTO: 0.08 K/UL (ref 0–0.2)
BASOPHILS # BLD AUTO: 0.11 K/UL (ref 0–0.2)
BASOPHILS NFR BLD: 0.7 % (ref 0–1.9)
BASOPHILS NFR BLD: 0.8 % (ref 0–1.9)
BILIRUB SERPL-MCNC: 0.8 MG/DL (ref 0.1–1)
BILIRUB SERPL-MCNC: 0.8 MG/DL (ref 0.1–1)
BLD GP AB SCN CELLS X3 SERPL QL: NORMAL
BLD PROD TYP BPU: NORMAL
BLD PROD TYP BPU: NORMAL
BLOOD UNIT EXPIRATION DATE: NORMAL
BLOOD UNIT EXPIRATION DATE: NORMAL
BLOOD UNIT TYPE CODE: 5100
BLOOD UNIT TYPE CODE: 5100
BLOOD UNIT TYPE: NORMAL
BLOOD UNIT TYPE: NORMAL
BSA FOR ECHO PROCEDURE: 1.79 M2
BUN SERPL-MCNC: 43 MG/DL (ref 6–20)
BUN SERPL-MCNC: 49 MG/DL (ref 6–20)
BUN SERPL-MCNC: 50 MG/DL (ref 6–20)
CALCIUM SERPL-MCNC: 8.6 MG/DL (ref 8.7–10.5)
CALCIUM SERPL-MCNC: 8.6 MG/DL (ref 8.7–10.5)
CALCIUM SERPL-MCNC: 9.1 MG/DL (ref 8.7–10.5)
CHLORIDE SERPL-SCNC: 110 MMOL/L (ref 95–110)
CO2 SERPL-SCNC: 26 MMOL/L (ref 23–29)
CO2 SERPL-SCNC: 27 MMOL/L (ref 23–29)
CO2 SERPL-SCNC: 27 MMOL/L (ref 23–29)
CODING SYSTEM: NORMAL
CODING SYSTEM: NORMAL
CREAT SERPL-MCNC: 1.1 MG/DL (ref 0.5–1.4)
CREAT SERPL-MCNC: 1.1 MG/DL (ref 0.5–1.4)
CREAT SERPL-MCNC: 1.3 MG/DL (ref 0.5–1.4)
CV ECHO LV RWT: 0.22 CM
DIFFERENTIAL METHOD: ABNORMAL
DIFFERENTIAL METHOD: ABNORMAL
DISPENSE STATUS: NORMAL
DISPENSE STATUS: NORMAL
DOP CALC AO PEAK VEL: 1.08 M/S
DOP CALC AO VTI: 18.4 CM
DOP CALC LVOT AREA: 3.11 CM2
DOP CALC LVOT DIAMETER: 1.99 CM
DOP CALC LVOT PEAK VEL: 0.8 M/S
DOP CALC LVOT STROKE VOLUME: 45.42 CM3
DOP CALCLVOT PEAK VEL VTI: 14.61 CM
E WAVE DECELERATION TIME: 71.55 MSEC
E/A RATIO: 1.95
E/E' RATIO: 24.22
ECHO LV POSTERIOR WALL: 0.58 CM (ref 0.6–1.1)
EOSINOPHIL # BLD AUTO: 0.7 K/UL (ref 0–0.5)
EOSINOPHIL # BLD AUTO: 0.8 K/UL (ref 0–0.5)
EOSINOPHIL NFR BLD: 5.2 % (ref 0–8)
EOSINOPHIL NFR BLD: 7.1 % (ref 0–8)
ERYTHROCYTE [DISTWIDTH] IN BLOOD BY AUTOMATED COUNT: 14 % (ref 11.5–14.5)
ERYTHROCYTE [DISTWIDTH] IN BLOOD BY AUTOMATED COUNT: 15.9 % (ref 11.5–14.5)
EST. GFR  (AFRICAN AMERICAN): 52.3 ML/MIN/1.73 M^2
EST. GFR  (AFRICAN AMERICAN): >60 ML/MIN/1.73 M^2
EST. GFR  (AFRICAN AMERICAN): >60 ML/MIN/1.73 M^2
EST. GFR  (NON AFRICAN AMERICAN): 45.3 ML/MIN/1.73 M^2
EST. GFR  (NON AFRICAN AMERICAN): 55.5 ML/MIN/1.73 M^2
EST. GFR  (NON AFRICAN AMERICAN): 55.5 ML/MIN/1.73 M^2
FRACTIONAL SHORTENING: 18 % (ref 28–44)
GLUCOSE SERPL-MCNC: 137 MG/DL (ref 70–110)
GLUCOSE SERPL-MCNC: 143 MG/DL (ref 70–110)
GLUCOSE SERPL-MCNC: 273 MG/DL (ref 70–110)
HCT VFR BLD AUTO: 21.1 % (ref 37–48.5)
HCT VFR BLD AUTO: 34 % (ref 37–48.5)
HGB BLD-MCNC: 10.5 G/DL (ref 12–16)
HGB BLD-MCNC: 6.4 G/DL (ref 12–16)
IMM GRANULOCYTES # BLD AUTO: 0.04 K/UL (ref 0–0.04)
IMM GRANULOCYTES # BLD AUTO: 0.05 K/UL (ref 0–0.04)
IMM GRANULOCYTES NFR BLD AUTO: 0.4 % (ref 0–0.5)
IMM GRANULOCYTES NFR BLD AUTO: 0.4 % (ref 0–0.5)
INTERVENTRICULAR SEPTUM: 0.66 CM (ref 0.6–1.1)
LA MAJOR: 4.62 CM
LA MINOR: 4.5 CM
LA WIDTH: 4.16 CM
LEFT ATRIUM SIZE: 3.97 CM
LEFT ATRIUM VOLUME INDEX: 36.2 ML/M2
LEFT ATRIUM VOLUME: 64 CM3
LEFT INTERNAL DIMENSION IN SYSTOLE: 4.4 CM (ref 2.1–4)
LEFT VENTRICLE DIASTOLIC VOLUME INDEX: 78.89 ML/M2
LEFT VENTRICLE DIASTOLIC VOLUME: 139.65 ML
LEFT VENTRICLE MASS INDEX: 63.3 G/M2
LEFT VENTRICLE SYSTOLIC VOLUME INDEX: 49.5 ML/M2
LEFT VENTRICLE SYSTOLIC VOLUME: 87.65 ML
LEFT VENTRICULAR INTERNAL DIMENSION IN DIASTOLE: 5.37 CM (ref 3.5–6)
LEFT VENTRICULAR MASS: 112.05 G
LV LATERAL E/E' RATIO: 21.8
LV SEPTAL E/E' RATIO: 27.25
LYMPHOCYTES # BLD AUTO: 1.1 K/UL (ref 1–4.8)
LYMPHOCYTES # BLD AUTO: 1.8 K/UL (ref 1–4.8)
LYMPHOCYTES NFR BLD: 16.4 % (ref 18–48)
LYMPHOCYTES NFR BLD: 8.7 % (ref 18–48)
MAGNESIUM SERPL-MCNC: 1.5 MG/DL (ref 1.6–2.6)
MAGNESIUM SERPL-MCNC: 1.5 MG/DL (ref 1.6–2.6)
MAGNESIUM SERPL-MCNC: 1.6 MG/DL (ref 1.6–2.6)
MCH RBC QN AUTO: 28.4 PG (ref 27–31)
MCH RBC QN AUTO: 28.7 PG (ref 27–31)
MCHC RBC AUTO-ENTMCNC: 30.3 G/DL (ref 32–36)
MCHC RBC AUTO-ENTMCNC: 30.9 G/DL (ref 32–36)
MCV RBC AUTO: 92 FL (ref 82–98)
MCV RBC AUTO: 95 FL (ref 82–98)
MONOCYTES # BLD AUTO: 0.9 K/UL (ref 0.3–1)
MONOCYTES # BLD AUTO: 0.9 K/UL (ref 0.3–1)
MONOCYTES NFR BLD: 6.5 % (ref 4–15)
MONOCYTES NFR BLD: 8.4 % (ref 4–15)
MV PEAK A VEL: 0.56 M/S
MV PEAK E VEL: 1.09 M/S
NEUTROPHILS # BLD AUTO: 10.2 K/UL (ref 1.8–7.7)
NEUTROPHILS # BLD AUTO: 7.4 K/UL (ref 1.8–7.7)
NEUTROPHILS NFR BLD: 67 % (ref 38–73)
NEUTROPHILS NFR BLD: 78.4 % (ref 38–73)
NRBC BLD-RTO: 0 /100 WBC
NRBC BLD-RTO: 0 /100 WBC
PHOSPHATE SERPL-MCNC: 3.1 MG/DL (ref 2.7–4.5)
PHOSPHATE SERPL-MCNC: 3.2 MG/DL (ref 2.7–4.5)
PLATELET # BLD AUTO: 240 K/UL (ref 150–350)
PLATELET # BLD AUTO: 273 K/UL (ref 150–350)
PMV BLD AUTO: 10.7 FL (ref 9.2–12.9)
PMV BLD AUTO: 10.7 FL (ref 9.2–12.9)
POCT GLUCOSE: 151 MG/DL (ref 70–110)
POCT GLUCOSE: 172 MG/DL (ref 70–110)
POCT GLUCOSE: 184 MG/DL (ref 70–110)
POCT GLUCOSE: 187 MG/DL (ref 70–110)
POCT GLUCOSE: 261 MG/DL (ref 70–110)
POTASSIUM SERPL-SCNC: 3.3 MMOL/L (ref 3.5–5.1)
POTASSIUM SERPL-SCNC: 3.5 MMOL/L (ref 3.5–5.1)
POTASSIUM SERPL-SCNC: 3.8 MMOL/L (ref 3.5–5.1)
POTASSIUM SERPL-SCNC: 4 MMOL/L (ref 3.5–5.1)
PROT SERPL-MCNC: 6.4 G/DL (ref 6–8.4)
PROT SERPL-MCNC: 6.6 G/DL (ref 6–8.4)
PULM VEIN S/D RATIO: 0.41
PV PEAK D VEL: 0.76 M/S
PV PEAK S VEL: 0.31 M/S
RA MAJOR: 3.28 CM
RA WIDTH: 3.37 CM
RBC # BLD AUTO: 2.23 M/UL (ref 4–5.4)
RBC # BLD AUTO: 3.7 M/UL (ref 4–5.4)
RETIRED EF AND QEF - SEE NOTES: 38 %
RIGHT VENTRICULAR END-DIASTOLIC DIMENSION: 2.85 CM
SINUS: 3.01 CM
SODIUM SERPL-SCNC: 146 MMOL/L (ref 136–145)
SODIUM SERPL-SCNC: 146 MMOL/L (ref 136–145)
SODIUM SERPL-SCNC: 147 MMOL/L (ref 136–145)
STJ: 2.21 CM
TDI LATERAL: 0.05
TDI SEPTAL: 0.04
TDI: 0.05
TRANS ERYTHROCYTES VOL PATIENT: NORMAL ML
TRANS ERYTHROCYTES VOL PATIENT: NORMAL ML
TRICUSPID ANNULAR PLANE SYSTOLIC EXCURSION: 1.56 CM
TROPONIN I SERPL DL<=0.01 NG/ML-MCNC: 2.7 NG/ML (ref 0–0.03)
TROPONIN I SERPL DL<=0.01 NG/ML-MCNC: 3.15 NG/ML (ref 0–0.03)
TROPONIN I SERPL DL<=0.01 NG/ML-MCNC: 3.34 NG/ML (ref 0–0.03)
TROPONIN I SERPL DL<=0.01 NG/ML-MCNC: 5.95 NG/ML (ref 0–0.03)
WBC # BLD AUTO: 11.05 K/UL (ref 3.9–12.7)
WBC # BLD AUTO: 13.06 K/UL (ref 3.9–12.7)

## 2019-06-07 PROCEDURE — P9021 RED BLOOD CELLS UNIT: HCPCS

## 2019-06-07 PROCEDURE — 99900035 HC TECH TIME PER 15 MIN (STAT)

## 2019-06-07 PROCEDURE — 80048 BASIC METABOLIC PNL TOTAL CA: CPT

## 2019-06-07 PROCEDURE — 84484 ASSAY OF TROPONIN QUANT: CPT | Mod: 91

## 2019-06-07 PROCEDURE — 36430 TRANSFUSION BLD/BLD COMPNT: CPT

## 2019-06-07 PROCEDURE — 25000003 PHARM REV CODE 250: Performed by: INTERNAL MEDICINE

## 2019-06-07 PROCEDURE — 25000242 PHARM REV CODE 250 ALT 637 W/ HCPCS: Performed by: NURSE PRACTITIONER

## 2019-06-07 PROCEDURE — 25000003 PHARM REV CODE 250: Performed by: STUDENT IN AN ORGANIZED HEALTH CARE EDUCATION/TRAINING PROGRAM

## 2019-06-07 PROCEDURE — 86920 COMPATIBILITY TEST SPIN: CPT

## 2019-06-07 PROCEDURE — 99223 PR INITIAL HOSPITAL CARE,LEVL III: ICD-10-PCS | Mod: ,,, | Performed by: INTERNAL MEDICINE

## 2019-06-07 PROCEDURE — 99292 PR CRITICAL CARE, ADDL 30 MIN: ICD-10-PCS | Mod: ,,, | Performed by: INTERNAL MEDICINE

## 2019-06-07 PROCEDURE — 94640 AIRWAY INHALATION TREATMENT: CPT

## 2019-06-07 PROCEDURE — 99499 NO LOS: ICD-10-PCS | Mod: ,,, | Performed by: NURSE PRACTITIONER

## 2019-06-07 PROCEDURE — 27000221 HC OXYGEN, UP TO 24 HOURS

## 2019-06-07 PROCEDURE — 99222 1ST HOSP IP/OBS MODERATE 55: CPT | Mod: ,,, | Performed by: EMERGENCY MEDICINE

## 2019-06-07 PROCEDURE — 80053 COMPREHEN METABOLIC PANEL: CPT

## 2019-06-07 PROCEDURE — 84132 ASSAY OF SERUM POTASSIUM: CPT

## 2019-06-07 PROCEDURE — 83735 ASSAY OF MAGNESIUM: CPT | Mod: 91

## 2019-06-07 PROCEDURE — 85025 COMPLETE CBC W/AUTO DIFF WBC: CPT | Mod: 91

## 2019-06-07 PROCEDURE — 99223 1ST HOSP IP/OBS HIGH 75: CPT | Mod: ,,, | Performed by: INTERNAL MEDICINE

## 2019-06-07 PROCEDURE — 99223 PR INITIAL HOSPITAL CARE,LEVL III: ICD-10-PCS | Mod: GC,,, | Performed by: INTERNAL MEDICINE

## 2019-06-07 PROCEDURE — 86850 RBC ANTIBODY SCREEN: CPT

## 2019-06-07 PROCEDURE — 99291 PR CRITICAL CARE, E/M 30-74 MINUTES: ICD-10-PCS | Mod: ,,, | Performed by: GENERAL ACUTE CARE HOSPITAL

## 2019-06-07 PROCEDURE — 63600175 PHARM REV CODE 636 W HCPCS: Performed by: INTERNAL MEDICINE

## 2019-06-07 PROCEDURE — S0028 INJECTION, FAMOTIDINE, 20 MG: HCPCS | Performed by: NURSE PRACTITIONER

## 2019-06-07 PROCEDURE — 27200966 HC CLOSED SUCTION SYSTEM

## 2019-06-07 PROCEDURE — 99223 1ST HOSP IP/OBS HIGH 75: CPT | Mod: GC,,, | Performed by: INTERNAL MEDICINE

## 2019-06-07 PROCEDURE — 25000003 PHARM REV CODE 250: Performed by: GENERAL ACUTE CARE HOSPITAL

## 2019-06-07 PROCEDURE — 63600175 PHARM REV CODE 636 W HCPCS: Performed by: GENERAL ACUTE CARE HOSPITAL

## 2019-06-07 PROCEDURE — 99900026 HC AIRWAY MAINTENANCE (STAT)

## 2019-06-07 PROCEDURE — 99292 CRITICAL CARE ADDL 30 MIN: CPT | Mod: ,,, | Performed by: INTERNAL MEDICINE

## 2019-06-07 PROCEDURE — 84484 ASSAY OF TROPONIN QUANT: CPT

## 2019-06-07 PROCEDURE — 93010 EKG 12-LEAD: ICD-10-PCS | Mod: ,,, | Performed by: INTERNAL MEDICINE

## 2019-06-07 PROCEDURE — 93010 ELECTROCARDIOGRAM REPORT: CPT | Mod: ,,, | Performed by: INTERNAL MEDICINE

## 2019-06-07 PROCEDURE — 94003 VENT MGMT INPAT SUBQ DAY: CPT

## 2019-06-07 PROCEDURE — 25500020 PHARM REV CODE 255: Performed by: INTERNAL MEDICINE

## 2019-06-07 PROCEDURE — 63600175 PHARM REV CODE 636 W HCPCS: Performed by: NURSE PRACTITIONER

## 2019-06-07 PROCEDURE — A4216 STERILE WATER/SALINE, 10 ML: HCPCS | Performed by: NURSE PRACTITIONER

## 2019-06-07 PROCEDURE — 99499 UNLISTED E&M SERVICE: CPT | Mod: ,,, | Performed by: NURSE PRACTITIONER

## 2019-06-07 PROCEDURE — 99222 PR INITIAL HOSPITAL CARE,LEVL II: ICD-10-PCS | Mod: ,,, | Performed by: EMERGENCY MEDICINE

## 2019-06-07 PROCEDURE — 25000003 PHARM REV CODE 250

## 2019-06-07 PROCEDURE — 83735 ASSAY OF MAGNESIUM: CPT

## 2019-06-07 PROCEDURE — 84100 ASSAY OF PHOSPHORUS: CPT | Mod: 91

## 2019-06-07 PROCEDURE — 93005 ELECTROCARDIOGRAM TRACING: CPT

## 2019-06-07 PROCEDURE — 99291 CRITICAL CARE FIRST HOUR: CPT | Mod: ,,, | Performed by: GENERAL ACUTE CARE HOSPITAL

## 2019-06-07 PROCEDURE — 80053 COMPREHEN METABOLIC PANEL: CPT | Mod: 91

## 2019-06-07 PROCEDURE — 63600175 PHARM REV CODE 636 W HCPCS

## 2019-06-07 PROCEDURE — 20000000 HC ICU ROOM

## 2019-06-07 PROCEDURE — 25000003 PHARM REV CODE 250: Performed by: NURSE PRACTITIONER

## 2019-06-07 PROCEDURE — 94761 N-INVAS EAR/PLS OXIMETRY MLT: CPT

## 2019-06-07 RX ORDER — LANOLIN ALCOHOL/MO/W.PET/CERES
400 CREAM (GRAM) TOPICAL ONCE
Status: COMPLETED | OUTPATIENT
Start: 2019-06-07 | End: 2019-06-07

## 2019-06-07 RX ORDER — NITROGLYCERIN 0.4 MG/1
0.4 TABLET SUBLINGUAL EVERY 5 MIN PRN
Status: DISCONTINUED | OUTPATIENT
Start: 2019-06-07 | End: 2019-06-12 | Stop reason: HOSPADM

## 2019-06-07 RX ORDER — MORPHINE SULFATE 2 MG/ML
2 INJECTION, SOLUTION INTRAMUSCULAR; INTRAVENOUS EVERY 4 HOURS PRN
Status: DISCONTINUED | OUTPATIENT
Start: 2019-06-08 | End: 2019-06-12 | Stop reason: HOSPADM

## 2019-06-07 RX ORDER — POTASSIUM CHLORIDE 20 MEQ/15ML
40 SOLUTION ORAL
Status: DISCONTINUED | OUTPATIENT
Start: 2019-06-07 | End: 2019-06-07

## 2019-06-07 RX ORDER — ACETAMINOPHEN 650 MG/20.3ML
650 LIQUID ORAL EVERY 6 HOURS PRN
Status: DISCONTINUED | OUTPATIENT
Start: 2019-06-07 | End: 2019-06-12 | Stop reason: HOSPADM

## 2019-06-07 RX ORDER — POTASSIUM CHLORIDE 20 MEQ/15ML
40 SOLUTION ORAL ONCE
Status: COMPLETED | OUTPATIENT
Start: 2019-06-07 | End: 2019-06-07

## 2019-06-07 RX ORDER — SODIUM CHLORIDE 0.9 % (FLUSH) 0.9 %
10 SYRINGE (ML) INJECTION
Status: DISCONTINUED | OUTPATIENT
Start: 2019-06-07 | End: 2019-06-12 | Stop reason: HOSPADM

## 2019-06-07 RX ORDER — ONDANSETRON 2 MG/ML
4 INJECTION INTRAMUSCULAR; INTRAVENOUS EVERY 6 HOURS PRN
Status: DISCONTINUED | OUTPATIENT
Start: 2019-06-07 | End: 2019-06-12 | Stop reason: HOSPADM

## 2019-06-07 RX ORDER — FUROSEMIDE 10 MG/ML
INJECTION INTRAMUSCULAR; INTRAVENOUS
Status: DISPENSED
Start: 2019-06-07 | End: 2019-06-08

## 2019-06-07 RX ORDER — SCOLOPAMINE TRANSDERMAL SYSTEM 1 MG/1
1 PATCH, EXTENDED RELEASE TRANSDERMAL
Status: DISCONTINUED | OUTPATIENT
Start: 2019-06-07 | End: 2019-06-12 | Stop reason: HOSPADM

## 2019-06-07 RX ORDER — LIDOCAINE HYDROCHLORIDE 10 MG/ML
INJECTION, SOLUTION EPIDURAL; INFILTRATION; INTRACAUDAL; PERINEURAL
Status: COMPLETED
Start: 2019-06-07 | End: 2019-06-07

## 2019-06-07 RX ORDER — MAGNESIUM SULFATE HEPTAHYDRATE 40 MG/ML
2 INJECTION, SOLUTION INTRAVENOUS ONCE
Status: COMPLETED | OUTPATIENT
Start: 2019-06-07 | End: 2019-06-08

## 2019-06-07 RX ORDER — FAMOTIDINE 20 MG/1
20 TABLET, FILM COATED ORAL 2 TIMES DAILY
Status: DISCONTINUED | OUTPATIENT
Start: 2019-06-07 | End: 2019-06-10

## 2019-06-07 RX ORDER — ONDANSETRON 2 MG/ML
INJECTION INTRAMUSCULAR; INTRAVENOUS
Status: COMPLETED
Start: 2019-06-07 | End: 2019-06-07

## 2019-06-07 RX ORDER — FAMOTIDINE 20 MG/1
20 TABLET, FILM COATED ORAL DAILY
Status: DISCONTINUED | OUTPATIENT
Start: 2019-06-08 | End: 2019-06-07

## 2019-06-07 RX ORDER — ACETAMINOPHEN 650 MG/20.3ML
650 LIQUID ORAL EVERY 6 HOURS PRN
Status: DISCONTINUED | OUTPATIENT
Start: 2019-06-07 | End: 2019-06-07

## 2019-06-07 RX ORDER — LOPERAMIDE HCL 1MG/7.5ML
2 LIQUID (ML) ORAL 4 TIMES DAILY PRN
Status: DISCONTINUED | OUTPATIENT
Start: 2019-06-07 | End: 2019-06-12 | Stop reason: HOSPADM

## 2019-06-07 RX ORDER — ATORVASTATIN CALCIUM 20 MG/1
80 TABLET, FILM COATED ORAL DAILY
Status: DISCONTINUED | OUTPATIENT
Start: 2019-06-07 | End: 2019-06-12 | Stop reason: HOSPADM

## 2019-06-07 RX ORDER — ATORVASTATIN CALCIUM 20 MG/1
80 TABLET, FILM COATED ORAL DAILY
Status: DISCONTINUED | OUTPATIENT
Start: 2019-06-07 | End: 2019-06-07

## 2019-06-07 RX ORDER — HYDROCODONE BITARTRATE AND ACETAMINOPHEN 500; 5 MG/1; MG/1
TABLET ORAL
Status: DISCONTINUED | OUTPATIENT
Start: 2019-06-07 | End: 2019-06-12

## 2019-06-07 RX ORDER — LIDOCAINE HYDROCHLORIDE 10 MG/ML
INJECTION INFILTRATION; PERINEURAL
Status: DISCONTINUED
Start: 2019-06-07 | End: 2019-06-07 | Stop reason: WASHOUT

## 2019-06-07 RX ORDER — POTASSIUM CHLORIDE 20 MEQ/15ML
20 SOLUTION ORAL DAILY
Status: DISCONTINUED | OUTPATIENT
Start: 2019-06-08 | End: 2019-06-08

## 2019-06-07 RX ORDER — FUROSEMIDE 10 MG/ML
60 INJECTION INTRAMUSCULAR; INTRAVENOUS 3 TIMES DAILY
Status: DISCONTINUED | OUTPATIENT
Start: 2019-06-07 | End: 2019-06-07

## 2019-06-07 RX ORDER — FUROSEMIDE 10 MG/ML
60 INJECTION INTRAMUSCULAR; INTRAVENOUS 3 TIMES DAILY
Status: DISCONTINUED | OUTPATIENT
Start: 2019-06-07 | End: 2019-06-12

## 2019-06-07 RX ADMIN — Medication 10 ML: at 10:06

## 2019-06-07 RX ADMIN — ONDANSETRON 4 MG: 2 INJECTION INTRAMUSCULAR; INTRAVENOUS at 07:06

## 2019-06-07 RX ADMIN — INSULIN ASPART 3 UNITS: 100 INJECTION, SOLUTION INTRAVENOUS; SUBCUTANEOUS at 11:06

## 2019-06-07 RX ADMIN — IPRATROPIUM BROMIDE AND ALBUTEROL SULFATE 3 ML: .5; 3 SOLUTION RESPIRATORY (INHALATION) at 07:06

## 2019-06-07 RX ADMIN — INSULIN ASPART 2 UNITS: 100 INJECTION, SOLUTION INTRAVENOUS; SUBCUTANEOUS at 05:06

## 2019-06-07 RX ADMIN — Medication 3 ML: at 06:06

## 2019-06-07 RX ADMIN — FUROSEMIDE 60 MG: 10 INJECTION, SOLUTION INTRAVENOUS at 09:06

## 2019-06-07 RX ADMIN — VANCOMYCIN HYDROCHLORIDE 1000 MG: 1 INJECTION, POWDER, LYOPHILIZED, FOR SOLUTION INTRAVENOUS at 02:06

## 2019-06-07 RX ADMIN — MAGNESIUM OXIDE TAB 400 MG (241.3 MG ELEMENTAL MG) 400 MG: 400 (241.3 MG) TAB at 06:06

## 2019-06-07 RX ADMIN — SODIUM CHLORIDE 300 MG: 9 INJECTION, SOLUTION INTRAVENOUS at 09:06

## 2019-06-07 RX ADMIN — IOHEXOL 100 ML: 350 INJECTION, SOLUTION INTRAVENOUS at 12:06

## 2019-06-07 RX ADMIN — ONDANSETRON 4 MG: 2 INJECTION INTRAMUSCULAR; INTRAVENOUS at 10:06

## 2019-06-07 RX ADMIN — POTASSIUM CHLORIDE 40 MEQ: 20 SOLUTION ORAL at 11:06

## 2019-06-07 RX ADMIN — INSULIN ASPART 2 UNITS: 100 INJECTION, SOLUTION INTRAVENOUS; SUBCUTANEOUS at 02:06

## 2019-06-07 RX ADMIN — ATORVASTATIN CALCIUM 80 MG: 20 TABLET, FILM COATED ORAL at 08:06

## 2019-06-07 RX ADMIN — ASPIRIN 325 MG ORAL TABLET 325 MG: 325 PILL ORAL at 08:06

## 2019-06-07 RX ADMIN — CHLORHEXIDINE GLUCONATE 0.12% ORAL RINSE 15 ML: 1.2 LIQUID ORAL at 09:06

## 2019-06-07 RX ADMIN — Medication 3 ML: at 01:06

## 2019-06-07 RX ADMIN — FAMOTIDINE 20 MG: 20 TABLET, FILM COATED ORAL at 09:06

## 2019-06-07 RX ADMIN — FAMOTIDINE 20 MG: 10 INJECTION, SOLUTION INTRAVENOUS at 08:06

## 2019-06-07 RX ADMIN — SODIUM CHLORIDE 300 MG: 9 INJECTION, SOLUTION INTRAVENOUS at 08:06

## 2019-06-07 RX ADMIN — IPRATROPIUM BROMIDE AND ALBUTEROL SULFATE 3 ML: .5; 3 SOLUTION RESPIRATORY (INHALATION) at 11:06

## 2019-06-07 RX ADMIN — FENTANYL CITRATE 50 MCG: 50 INJECTION INTRAMUSCULAR; INTRAVENOUS at 12:06

## 2019-06-07 RX ADMIN — CEFEPIME 1 G: 1 INJECTION, POWDER, FOR SOLUTION INTRAMUSCULAR; INTRAVENOUS at 01:06

## 2019-06-07 RX ADMIN — LIDOCAINE HYDROCHLORIDE: 10 INJECTION, SOLUTION EPIDURAL; INFILTRATION; INTRACAUDAL; PERINEURAL at 11:06

## 2019-06-07 RX ADMIN — MAGNESIUM SULFATE IN WATER 2 G: 40 INJECTION, SOLUTION INTRAVENOUS at 11:06

## 2019-06-07 RX ADMIN — FENTANYL CITRATE 50 MCG: 50 INJECTION INTRAMUSCULAR; INTRAVENOUS at 07:06

## 2019-06-07 RX ADMIN — ONDANSETRON 4 MG: 2 INJECTION INTRAMUSCULAR; INTRAVENOUS at 05:06

## 2019-06-07 RX ADMIN — SCOPALAMINE 1 PATCH: 1 PATCH, EXTENDED RELEASE TRANSDERMAL at 12:06

## 2019-06-07 RX ADMIN — FENTANYL CITRATE 50 MCG: 50 INJECTION INTRAMUSCULAR; INTRAVENOUS at 04:06

## 2019-06-07 RX ADMIN — SCOPALAMINE 1 PATCH: 1 PATCH, EXTENDED RELEASE TRANSDERMAL at 11:06

## 2019-06-07 RX ADMIN — ACETAMINOPHEN 650 MG: 325 TABLET ORAL at 07:06

## 2019-06-07 RX ADMIN — CHLORHEXIDINE GLUCONATE 0.12% ORAL RINSE 15 ML: 1.2 LIQUID ORAL at 08:06

## 2019-06-07 RX ADMIN — ONDANSETRON 4 MG: 2 INJECTION INTRAMUSCULAR; INTRAVENOUS at 12:06

## 2019-06-07 RX ADMIN — FUROSEMIDE 60 MG: 10 INJECTION, SOLUTION INTRAMUSCULAR; INTRAVENOUS at 01:06

## 2019-06-07 RX ADMIN — FENTANYL CITRATE 50 MCG: 50 INJECTION INTRAMUSCULAR; INTRAVENOUS at 11:06

## 2019-06-07 RX ADMIN — CEFEPIME 1 G: 1 INJECTION, POWDER, FOR SOLUTION INTRAMUSCULAR; INTRAVENOUS at 02:06

## 2019-06-07 RX ADMIN — POTASSIUM CHLORIDE 40 MEQ: 20 SOLUTION ORAL at 06:06

## 2019-06-07 NOTE — SUBJECTIVE & OBJECTIVE
Past Medical History:   Diagnosis Date    Anticoagulant long-term use     Asthma     Back pain     Bradycardia, unspecified 2019    The etiology of the bradycardic episode is unclear.  The have appear to be respiratory in origin (at least the 1st episode).  We will continue to monitor carefully.  We are awaiting evaluation by Cardiology.      CAD (coronary artery disease)     s/p stentimg  (2), (1)    Carotid artery stenosis     Diabetes mellitus type 2 in obese     HTN (hypertension), benign     Hyperlipidemia     Keloid cicatrix     NPDR (nonproliferative diabetic retinopathy) 2015    NSTEMI (non-ST elevated myocardial infarction)     Nuclear sclerosis - Right Eye 3/18/2014    Primary localized osteoarthrosis, lower leg 2014    Sleep apnea        Past Surgical History:   Procedure Laterality Date    BRONCHOSCOPY N/A 2019    Performed by Sean Ruano MD at [facility] OR 2ND FLR    CARDIAC CATHETERIZATION      cataract extraction left eye      cataracts      CATHETERIZATION, HEART, LEFT Left 2014    Performed by Wilman Kim MD at [facility] CATH LAB     SECTION, LOW TRANSVERSE      COLONOSCOPY N/A 2019    Performed by Al Alaniz MD at [facility] ENDO (2ND FLR)    CORONARY ANGIOPLASTY      Creation, Nephrostomy, Percutaneous Left 2019    Performed by Karina Surgeon at [facility] KARINA    CREATION, TRACHEOSTOMY N/A 2019    Performed by Sean Ruano MD at [facility] OR 2ND FLR    EGD, WITH PEG TUBE INSERTION  2019    Performed by Sean Ruano MD at [facility] OR 2ND FLR    ESOPHAGOGASTRODUODENOSCOPY (EGD) N/A 2016    Performed by Gardenia Adamson MD at [facility] ENDO (4TH FLR)    EXCISION TURBINATE, SUBMUCOUS      FUSION, SPINE, LUMBAR, ANTERIOR APPROACH Left L5-S1 Anterior to Psoa Interbody Fusion, L5-S1 Posterior Instrumentation Left 2019    Performed by Mk George MD at [facility] OR 2ND FLR    HAND SURGERY Left     HAND SURGERY  Right     torn ligament repair/ Dr. Yeboah    HYSTERECTOMY      Injection,steroid,epidural,transforaminal approach - Bilateral - S1 Bilateral 9/25/2018    Performed by Tl Abreu MD at [facility] PAIN MGT    Injection-steroid-epidural-caudal N/A 2/7/2019    Performed by Dave Bentley Jr., MD at [facility] PAIN MGT    INSERTION-INTRAOCULAR LENS (IOL) Right 9/1/2015    Performed by Good Domingo MD at [facility] OR 1ST FLR    LAPAROTOMY, EXPLORATORY; LIGATION OF LEFT URETER; DOUBLE J STENT REMOVAL LEFT URETER Left 4/20/2019    Performed by Paul Carlson MD at [facility] OR 2ND FLR    left foot surgery      left wrist surgery      NASAL SEPTUM SURGERY  5/7/15    PHACOEMULSIFICATION-ASPIRATION-CATARACT Right 9/1/2015    Performed by Good Domingo MD at [facility] OR 1ST FLR    REPAIR, URETER  4/12/2019    [facility]    RESECTION-TURBINATES (SMR) N/A 5/7/2015    Performed by Dileep Dubois III, MD at [facility] OR 2ND FLR    rt elbow surgery      S/P LAD COATED STENT  05/14/2010    6 total     S/P OM1 STENT  08/2003    SEPTOPLASTY N/A 5/7/2015    Performed by Dileep Dubois III, MD at [facility] OR 2ND FLR    SIGMOIDOSCOPY, FLEXIBLE N/A 5/21/2019    Performed by ALBERTO Amin MD at [facility] ENDO (2ND FLR)    SIGMOIDOSCOPY, FLEXIBLE N/A 5/13/2019    Performed by ALBERTO Amin MD at [facility] ENDO (2ND FLR)    SINUS SURGERY      F.E.S.S.    SINUS SURGERY FUNCTIONAL ENDOSCOPIC WITH NAVIGATION WITH MAXILLARIES, ETHMOIDS, LEFT SPHENOID, LEFT LOLY N/A 5/7/2015    Performed by Dileep Dubois III, MD at [facility] OR 2ND FLR    STENT, URETERAL placement  4/12/2019    Performed by Robin Boyd MD at [facility] OR [facility] FLR    TUBAL LIGATION         Review of patient's allergies indicates:  No Known Allergies    Current Facility-Administered Medications on File Prior to Encounter   Medication    lidocaine (PF) 10 mg/ml (1%) injection 10 mg    [DISCONTINUED] acetaminophen tablet     "[DISCONTINUED] albuterol-ipratropium 2.5 mg-0.5 mg/3 mL nebulizer solution    [DISCONTINUED] amLODIPine tablet    [DISCONTINUED] bacitracin ointment    [DISCONTINUED] bisacodyl suppository    [DISCONTINUED] chlorhexidine 0.12 % solution    [DISCONTINUED] dextrose 40 % gel    [DISCONTINUED] dextrose 5 % and 0.45 % NaCl infusion    [DISCONTINUED] docusate sodium capsule    [DISCONTINUED] GENERIC EXTERNAL MEDICATION    [DISCONTINUED] GENERIC EXTERNAL MEDICATION    [DISCONTINUED] GENERIC EXTERNAL MEDICATION    [DISCONTINUED] GENERIC EXTERNAL MEDICATION    [DISCONTINUED] GENERIC EXTERNAL MEDICATION    [DISCONTINUED] GENERIC EXTERNAL MEDICATION    [DISCONTINUED] GENERIC EXTERNAL MEDICATION    [DISCONTINUED] GENERIC EXTERNAL MEDICATION    [DISCONTINUED] glucagon (human recombinant) injection    [DISCONTINUED] insulin lispro injection    [DISCONTINUED] lidocaine 5 % patch    [DISCONTINUED] miconazole 2 % cream    [DISCONTINUED] ondansetron disintegrating tablet    [DISCONTINUED] oxyCODONE immediate release tablet Tab    [DISCONTINUED] pantoprazole suspension    [DISCONTINUED] scopolamine 1.3-1.5 mg (1 mg over 3 days)     Current Outpatient Medications on File Prior to Encounter   Medication Sig    ACCU-CHEK EDIN PLUS METER Misc     albuterol (PROVENTIL/VENTOLIN HFA) 90 mcg/actuation inhaler Inhale 2 puffs into the lungs every 6 (six) hours as needed for Wheezing.    amLODIPine (NORVASC) 10 MG tablet TAKE 1 TABLET (10 MG TOTAL) BY MOUTH ONCE DAILY.    aspirin 81 mg Tab Take 81 mg by mouth. 1 Tablet Oral Every day    atorvastatin (LIPITOR) 40 MG tablet TAKE 1 TABLET (40 MG TOTAL) BY MOUTH ONCE DAILY.    BD INSULIN PEN NEEDLE UF MINI 31 x 3/16 " Ndle USE 4 TIMES A DAY    blood sugar diagnostic (ACCU-CHEK EDIN PLUS TEST STRP) Strp TEST BLOOD SUGARS 4 TIMES DAILY    chlorthalidone (HYGROTEN) 50 MG Tab Take 1 tablet (50 mg total) by mouth once daily.    esomeprazole (NEXIUM) 40 MG capsule TAKE 1 " "CAPSULE (40 MG TOTAL) BY MOUTH BEFORE BREAKFAST.    fenofibrate micronized (LOFIBRA) 134 MG Cap TAKE 1 CAPSULE (134 MG TOTAL) BY MOUTH BEFORE BREAKFAST.    flash glucose scanning reader (FREESTYLE MISTI 14 DAY READER) Misc 1 each by Misc.(Non-Drug; Combo Route) route once daily.    flash glucose sensor (FREESTYLE MISTI 14 DAY SENSOR) Kit 1 each by Misc.(Non-Drug; Combo Route) route once daily.    gabapentin (NEURONTIN) 100 MG capsule Take 2 capsules (200 mg total) by mouth every evening.    irbesartan (AVAPRO) 300 MG tablet Take 1 tablet (300 mg total) by mouth every evening.    lancets 30 gauge Misc     metoprolol succinate (TOPROL-XL) 100 MG 24 hr tablet TAKE 1 TABLET (100 MG TOTAL) BY MOUTH ONCE DAILY.    nitroGLYCERIN (NITROSTAT) 0.4 MG SL tablet Take one every 2-3 min till chestpain relief for 3 times and if still no relief, call MD or come to ED    omega-3 acid ethyl esters (LOVAZA) 1 gram capsule Take 1 g by mouth once daily.     pen needle, diabetic (BD ULTRA-FINE GRABIEL PEN NEEDLES) 32 gauge x 5/32" Ndle For use with insulin pens daily 4 times a day    tamsulosin (FLOMAX) 0.4 mg Cap Take 1 capsule (0.4 mg total) by mouth every evening.    tramadol (ULTRAM) 50 mg tablet Take 1 tablet (50 mg total) by mouth 3 (three) times daily as needed for Pain.    zolpidem (AMBIEN) 5 MG Tab Take 1 tablet (5 mg total) by mouth nightly as needed.     Family History     Problem Relation (Age of Onset)    Diabetes Mother, Father, Sister, Brother, Sister    Heart attack Father    Heart disease Mother    Leukemia Father    No Known Problems Sister, Brother, Brother, Maternal Grandmother, Maternal Grandfather, Paternal Grandmother, Paternal Grandfather, Son, Son, Maternal Aunt, Maternal Uncle, Paternal Aunt, Paternal Uncle        Tobacco Use    Smoking status: Never Smoker    Smokeless tobacco: Never Used   Substance and Sexual Activity    Alcohol use: No     Alcohol/week: 0.0 oz    Drug use: No    Sexual " activity: Yes     Partners: Male     Birth control/protection: Post-menopausal     Comment:      Review of Systems   Unable to perform ROS: other (difficult to obtain due to trach status)   Constitution: Positive for fever.   Cardiovascular: Negative for chest pain, irregular heartbeat and near-syncope.   Respiratory: Positive for shortness of breath.      Objective:     Vital Signs (Most Recent):  Temp: (!) 101 °F (38.3 °C) (06/06/19 1950)  Pulse: 81 (06/06/19 2114)  Resp: 18 (06/06/19 2114)  BP: (!) 93/53 (06/06/19 2114)  SpO2: 100 % (06/06/19 2114) Vital Signs (24h Range):  Temp:  [98.5 °F (36.9 °C)-101 °F (38.3 °C)] 101 °F (38.3 °C)  Pulse:  [] 81  Resp:  [16-58] 18  SpO2:  [91 %-100 %] 100 %  BP: ()/(38-87) 93/53     Weight: 70.9 kg (156 lb 4.9 oz)  Body mass index is 26.01 kg/m².    SpO2: 100 %  O2 Device (Oxygen Therapy): ventilator      Intake/Output Summary (Last 24 hours) at 6/6/2019 2205  Last data filed at 6/6/2019 2100  Gross per 24 hour   Intake 100 ml   Output 650 ml   Net -550 ml       Lines/Drains/Airways     Peripherally Inserted Central Catheter Line                 PICC Double Lumen 05/24/19 0900 right brachial 13 days          Drain                 Nephrostomy 04/21/19 0629 Left 8 Fr. 46 days         Gastrostomy/Enterostomy 05/02/19 1134 Percutaneous endoscopic gastrostomy (PEG) LUQ decompression;feeding 35 days         Urethral Catheter 06/06/19 1835 16 Fr. less than 1 day          Airway                 Surgical Airway 06/03/19 1234 Shiley Cuffed 3 days                Physical Exam   Constitutional: She is cooperative. Ill appearance: chronic ill appearance.   HENT:   Trach status on vent   Neck: Normal range of motion. Neck supple.   Difficult to visualize JVD   Cardiovascular: Tachycardia present.   Neurological: She is alert.   Skin: Skin is warm and dry. No erythema.   Psychiatric: She has a normal mood and affect. Her behavior is normal.       Significant Labs: All  pertinent lab results from the last 24 hours have been reviewed.    Significant Imaging: Reviewed in EPIC.

## 2019-06-07 NOTE — HPI
Mariann Huff is a 58 yo F with PMHx significant for CAD s/p PCI/GINGER to pLAD and dLAD 2013, PCI/GINGER to ostial LAD 2014), HFpEF, DM2, HTN, DLD, MURTAZA, obesity who presented to [facility] on 6/6 with complaints of shortness of breath. Cardiology consulted for elevated troponin.     Per chart review patient had two recent hospitalizations:  >> from 4/12-4/14/19 she was admitted for L5-S1 OLIF by Neurosurgery ; post-op course c/b intraoperative L ureteral injury with ureteroureteral anastomosis and ureteral stent placement.  >> from 4/20-6/5/19 she was admitted for intra-abdominal abscess s/p washout and ligation of left ureter with nephrostomy tube placement (4/21) ; patient discharged on IV cefazolin and rifampin per ID due to E coli and staph lugdenesis bacteremia (end date 6/18). Hospital course c/b respiratory failure and difficulty weaning from vent s/p tracheostomy and PEG placement (5/2) + rectal bleeding s/p PRBC transfusion 2/2 rectal ulcer + BL UE and RLE DVT s/p IVC filter (5/29).    On arrival to [facility] patient was initially afebrile, normotensive (130/60s), and tachycardic (110-120s), and saturating 100% on vent via trach collar (15 L/min 50% FiO2). Patient later noted to be febrile (101 F) and hypotensive (80s/40s).    Labs showed slightly elevated WBC 13.5 (from 11.5 from 12.9 during prior admit). Hemoglobin relatively stable at 7.8 from 8.3. Renal function panel shows PAPA (Cr 1.4 from 0.8).     Troponin was checked and elevated at 1.5 --> 2.3. Lactic acid normal 1.8.    EKG shows sinus tachycardia 117 bpm with ST depressions in lateral leads.    CTA chest ordered (not completed). CXR obtained with evidence of ill defined patchy airspace consolidation and/or edema bilaterally (worse compared to prior on 5/27).       Patient denies current or recent angina, states she had chest pain with her last MIs / stent placement.

## 2019-06-07 NOTE — CONSULTS
[facility]  Infectious Disease  Consult Note    Patient Name: Mariann Huff  MRN: [id number]  Admission Date: 6/6/2019  Hospital Length of Stay: 1 days  Attending Physician: Crystal Mejía MD  Primary Care Provider: Jasbir Haney MD     Isolation Status: No active isolations      Inpatient consult to Infectious Diseases  Consult performed by: SEBASTIAN Mcdaniel, ANP  Consult ordered by: Lisa George NP  Reason for consult: antibiotic recommendations        ID Consult acknowledged.   Full consult and recommendations to follow today  In the interim, please call for any immediate concerns.     Thank you.   Please call for any questions or concerns.  SEBASTIAN Oakes, ANP-C  [phone] pager,  [phone]

## 2019-06-07 NOTE — CONSULTS
Brief palliative care note:     Full consult note to follow.  Discussed with Dr. Mejía and chart reveiwed.  Pt known to palliative care from prior admit.  Briefly met with pt at the bedside.  Discussed the ongoing supportive role provided by team.  Pt amenable to ongoing follow up.     Will reach out to family and continue to follow with you.    Abraham Garcia MD  Palliative Medicine

## 2019-06-07 NOTE — ASSESSMENT & PLAN NOTE
58 y/o female well known to ID with HTN, DMII, CAD, NSTEMI, s/p L5-S1 OLIF on 4/12 complicated by intraoperative left ureteral injury s/p ureteroureteral anastomoses and ureteral stent placement who initially presented on 4/20 with fevers, AMS and intraabdominal abscess, s/p washout and ligation of left ureter with nephrostomy tube placement on 4/21.  She  has had a long and complicated hospital course since that time (see HPI), including tracheostomy and PEG tube placement, bilateral upper and lower extremity DVT, s/p IVC filter placement on 5/29, rectal bleeding, rectal ulcer.  She is currently on long term antibiotic therapy for E coli bacteremia and Staph lugdenesis infection (abdominal source) with plan for subsequent suppressive therapy given concern r hardware involvement (end date 6/18/19).  Was treated for HAP (Pseudomonas) and candida glabrata UTI I late May.        ID  Was reconsulted again on 6/2 for recurrent fever.  Workup was unremarkable at that time, she had no recurrence of fever, and she was discharged to rehab on 6/5/19.  Fevers subsequently recurred at rehab and she was sent to ED on 6/6 for repeat imaging.  She was stable on arrival to [facility], but while in ED she became acutely SOB with hypoxemia, became hypotensive.  Was intubated.  Noted to have significant volume overload on CXR, troponins elevated, NSTEMI.  Antibiotics were broadened to vanc and cefepime.  ID re-consulted for antibiotic recommendations.      CTA was negative for PE.  Noted to have bilateral patchy ground glass opacification throughout the lungs; bilateral basilar consolidation overall improved from prior CTA on 5/8; moderate bilateral pleural effusions. Noted to have  persistent fluid collection along the inferor aspect of her healing midline abdominal incision - 3.6 X 8.2 X 2.5 cm - with surrounding inflammatory changes.       Micro:  Repeat blood cultures NGTD   Mini BAL - gram stain negative, culture pending  U/A - 27  WBC, no bacteria; urine culture pending    Afebrile since yesterday. No leukocytosis.  She is having loose, soft stools, but not liquid.   Fontenot has been exchanged    Plan/recommendations:  1.  Continue IV vancomycin. Adjust dose to maintain trough 15-20  2. Continue cefepime. Recommend increasing dose to 2 grams q 12 hours (for creatinine clearance 30-60; current cr cl 54.8). If creatinine clear improves to >60, recommend increasing dose to 2 grams IV q 8 hours.  3.  Continue rifampin  mg twice a day.   4.  Recommend IR evaluation of abdominal fluid collection - can this be drained and cultured.   5.  Recommend Urology evaluation of nephrostomy tube   6.  If fevers recur, recommend removing PICC line.   7. Will follow cultures and adjust antibiotics accordingly.   8.  Will follow.  For any questions/concerns over the weekend, please page POLLY Maldonado or page ID staff on call for our service, Dr. Baptiste    Patient seen by, and plan discussed with, ID staff  Discussed with Primary Team

## 2019-06-07 NOTE — PLAN OF CARE
Problem: Adult Inpatient Plan of Care  Goal: Plan of Care Review  Recommendations  Recommendation/Intervention:  As medically appropriate, recommend initiating TF of Glucerna 1.5 at a goal rate of 45 mL/hr - to provide 1620 kcal/day, 89g protein/day, and 820mL free fluid/day.   RD to monitor.    Goals: Patient to receive nutrition by RD follow-up  Nutrition Goal Status: new    Full assessment completed, see RD Note 6/7/2019.

## 2019-06-07 NOTE — PLAN OF CARE
Problem: Adult Inpatient Plan of Care  Goal: Plan of Care Review  Outcome: Ongoing (interventions implemented as appropriate)  POC reviewed with Mariann Huff at 0500. Pt able to verbalize understanding. Questions and concerns addressed. CTA abdomen/pelvis + PE study performed. No evidence of PE. Levophed off at 0330. Troponin increasing. 12 lead obtained. Will continue to monitor. See flowsheets for full assessment and VS info

## 2019-06-07 NOTE — ASSESSMENT & PLAN NOTE
--Cardiology following.   --On aspirin. Holding on anticoagulation due to recent bleeding from rectal ulcer.

## 2019-06-07 NOTE — ASSESSMENT & PLAN NOTE
60 yo F with PMHx significant for CAD s/p PCI to LAD (2013, 2014) with known LCx 95% disease not revascularized due to issues in past, HTN, DLD, DM2, and recent prolonged hospitalization for sepsis 2/2 bacteremia from intra-abdominal abscess s/p I&D which was c/b prolonged resp failure s/p trach/PEG, GI bleed 2/2 rectal ulcer, and BL UE / RLE DVT s/p IVC filter who is re-admitted on 6/6 with acute hypoxemic respiratory failure.     Cardiology consulted for evaluation of troponin elevation.      Patient is asymptomatic and denies anginal complaints / index symptoms.      On admission she was febrile, tachycardic and hypotensive (requiring Levophed) with mildly elevated leukocytosis and CXR findings concerning for edema vs infection.      Suspect elevated troponin is related to demand ischemia due to hypoxemia and persistent tachycardia in setting of known obstructive coronary disease rather than from acute plaque rupture.      Do not suspect this is ACS.      Recs:  -- Would not treat this as ACS. Furthermore due to hemoglobin drop overnight requiring PRBC transfusion and recent GI bleeding do not recommend starting heparin gtt.  -- Continue ASA 81 mg daily (if deemed safe from GI perspective) and high intensity statin therapy due to known coronary artery disease.   -- Recommend starting Lopressor 12.5 mg BID as BP allows, titrated up to goal HR 55-60s with hx underlying CAD.  -- Follow up results of 2D echocardiogram.

## 2019-06-07 NOTE — CARE UPDATE
Pt is scheduled for CTA chest and CT A/P with contrast to evaluate for PE and nephrostomy tube placement/ongoing infection, respectively. Her creatinine increased from 0.8 to 1.4 suggesting acute renal failure. Expressed to family that introduction of contrast during acute kidney injury will put the patient at risk for requiring long-term hemodialysis. After discussion amongst each other, they have accepted the risk and wish to pursue further evaluation with contrast.     Behram Khan, MD  Internal Medicine, PGY-2  #[phone]  M: [phone]

## 2019-06-07 NOTE — PROGRESS NOTES
[facility]  Critical Care Medicine  Progress Note    Patient Name: Mariann Huff  MRN: [id number]  Admission Date: 6/6/2019  Hospital Length of Stay: 1 days  Code Status: Full Code  Attending Provider: Crystal Mejía MD  Primary Care Provider: Jasbir Haney MD   Principal Problem: Acute on chronic respiratory failure with hypoxia    Subjective:     HPI:  Mariann Huff is a 60 y/o female with history of Asthma, HTN, HLD,  CAD (LAD GINGER proximal and distal 2013 and GINGER ostial LAD 2/2014), HFpEF, NSTEMI, T2DM, Obesity, Sleep Apnea, and back pain who presented to the ED on 6/6/19 with acute shortness of breath started last night.      Mrs. Huff has had two recent hospitalizations. 4/12/19-4/14/19 for L5-S1 OLIF with Neurosurgery with intraoperaative left ureteral injury with ureteroureteral anastomosis and ureteral stent placement. Second admission, 4/20/19-6/5/19 for intraabdominal abscess s/p washout and ligation of left ureter with nephrostomy tube placement on 4/21/19 and tracheostomy and PEG placement on 5/2/19.  Most recent hospitalization complicated by BUE and RLE DVT s/p IVC filter on 5/29/19 given concern for GIB with workup revealing rectal ulcer requiring pRBC transfusion.  She was followed by ID during admission and is being treated with long term antibiotic therapy for E coli and Staph lugdenesis bacteremia with IV cefazolin and rifampin with end dated 6/18/19 given hardware.  She also completed 7 day course of cefepime for HAP (pseudomonas) on 5/25/19 and candida glabrata UTI that she received 7 days of high dose fluconazole.   She was discharged to rehab.    In the ED, she was started empirically on vancomycin and cefepime for possible PNA, CTA chest ordered for possible PE.  Labs remarkable for BNP 1403, lactate wnl, wbc 13 K, and sCr 1.4 ( from 0.8).  She was given a duo neb and placed on PSV. She is being admitted to the ICU with critical care medicine for further workup and  evaluation.      Hospital/ICU Course:  Ms. Huff was admitted to the MICU. Her troponin has continued to rise so Cardiology was consulted. Cardiology did not recommend any acute intervention or treating as ACS. The morning of 06/07/19 her Hb had dropped below 7 so she was transfused 2 units of PRBCs in the setting of elevated troponin.     Interval History/Significant Events: No significant events overnight. Troponin peaked to 5.9 and is now trending down.       Review of Systems   Constitutional: Negative for chills and diaphoresis.   HENT: Negative for postnasal drip.    Eyes: Negative for visual disturbance.   Respiratory: Positive for shortness of breath.    Cardiovascular: Negative for chest pain.   Gastrointestinal: Negative for abdominal pain and constipation.   Genitourinary: Negative for hematuria.   Musculoskeletal: Negative for myalgias.   Skin: Negative for rash.   Neurological: Negative for headaches.   Hematological: Negative for adenopathy.     Objective:     Vital Signs (Most Recent):  Temp: 98.7 °F (37.1 °C) (06/07/19 1500)  Pulse: (!) 121 (06/07/19 1615)  Resp: (!) 52 (06/07/19 1615)  BP: (!) 175/101 (06/07/19 1615)  SpO2: 100 % (06/07/19 1615) Vital Signs (24h Range):  Temp:  [98.4 °F (36.9 °C)-101 °F (38.3 °C)] 98.7 °F (37.1 °C)  Pulse:  [] 121  Resp:  [2-58] 52  SpO2:  [90 %-100 %] 100 %  BP: ()/() 175/101   Weight: 69.9 kg (154 lb)  Body mass index is 25.63 kg/m².      Intake/Output Summary (Last 24 hours) at 6/7/2019 1630  Last data filed at 6/7/2019 1600  Gross per 24 hour   Intake 1657 ml   Output 2638 ml   Net -981 ml       Physical Exam   HENT:   Head: Normocephalic and atraumatic.   Mouth/Throat: Oropharynx is clear and moist.   Eyes: Pupils are equal, round, and reactive to light. Conjunctivae are normal. Right eye exhibits no discharge. Left eye exhibits no discharge. No scleral icterus.   Neck: Trachea normal, normal range of motion and full passive range of motion  without pain. Neck supple. No JVD present. No tracheal deviation present. No thyromegaly present.   Cardiovascular: Normal rate, regular rhythm, S1 normal, S2 normal, normal heart sounds and intact distal pulses. Exam reveals no gallop and no friction rub.   No murmur heard.  Pulmonary/Chest: She is in respiratory distress. She has no wheezes. She has no rales. She exhibits no tenderness.   Abdominal: Soft. Bowel sounds are normal. She exhibits no distension and no mass. There is no tenderness. There is no rebound and no guarding.   Musculoskeletal: Normal range of motion. She exhibits no tenderness or deformity.   Lymphadenopathy:     She has no cervical adenopathy.   Neurological: No cranial nerve deficit. Coordination normal.   Skin: Skin is warm and dry. No abrasion and no bruising noted.   Psychiatric: She has a normal mood and affect.   Vitals reviewed.      Vents:  Vent Mode: A/C (06/07/19 1531)  Ventilator Initiated: Yes (06/06/19 1416)  Set Rate: 16 bmp (06/07/19 1531)  Vt Set: 400 mL (06/07/19 1531)  Pressure Support: 10 cmH20 (06/07/19 1201)  PEEP/CPAP: 8 cmH20 (06/07/19 1531)  Oxygen Concentration (%): 92 (06/07/19 1615)  Peak Airway Pressure: 30 cmH2O (06/07/19 1531)  Plateau Pressure: 0 cmH20 (06/07/19 1531)  Total Ve: 7.07 mL (06/07/19 1531)  F/VT Ratio<105 (RSBI): (!) 91.63 (06/07/19 1531)  Lines/Drains/Airways     Peripherally Inserted Central Catheter Line                 PICC Double Lumen 05/24/19 0900 right brachial 14 days          Drain                 Nephrostomy 04/21/19 0629 Left 8 Fr. 47 days         Gastrostomy/Enterostomy 05/02/19 1134 Percutaneous endoscopic gastrostomy (PEG) LUQ decompression;feeding 36 days         Urethral Catheter 06/06/19 1835 16 Fr. less than 1 day          Airway                 Surgical Airway 06/03/19 1234 Shiley Cuffed 4 days              Significant Labs:    CBC/Anemia Profile:  Recent Labs   Lab 06/06/19  1333 06/06/19  1740 06/07/19  0442   WBC 13.54*  --   11.05   HGB 7.8*  --  6.4*   HCT 25.6*  --  21.1*     --  240   MCV 92  --  95   RDW 13.9  --  14.0   OCCULTBLOOD  --  Negative  --         Chemistries:  Recent Labs   Lab 06/06/19  1333 06/07/19  0330 06/07/19  0442    146* 147*   K 4.5 3.3* 3.5    110 110   CO2 22* 27 27   BUN 48* 50* 49*   CREATININE 1.4 1.1 1.1   CALCIUM 9.7 8.6* 8.6*   ALBUMIN 2.1* 1.6* 1.7*   PROT 8.5* 6.4 6.6   BILITOT 0.7 0.8 0.8   ALKPHOS 172* 115 121   ALT 8* 6* 6*   AST 32 29 25   MG 2.2 1.6 1.5*   PHOS 2.5* 3.1 3.2     Significant Imaging:  I have reviewed and interpreted all pertinent imaging results/findings within the past 24 hours.      ABG  Recent Labs   Lab 06/06/19  1337   PH 7.371   PO2 20*   PCO2 55.9*   HCO3 32.4*   BE 7     Assessment/Plan:     Pulmonary  * Acute on chronic respiratory failure with hypoxia  --Suspect volume overload in the setting of NSTEMI.  --Diurese. Monitor I/O.  --Had to be placed back on mechanical ventilation after ~2 hours on trach collar.   --Reattempt tomorrow morning.     Cardiac/Vascular  (HFpEF) heart failure with preserved ejection fraction  --repeating TTE; concern for acute on chronic heart failure exacerbation    Essential hypertension  --holding antihypertensives (amlodipine and chlorthalidone).   --diuresing with lasix.       NSTEMI (non-ST elevated myocardial infarction)  --Cardiology following.   --On aspirin. Holding on anticoagulation due to recent bleeding from rectal ulcer.       CAD (coronary artery disease)  --rising troponin with EKG concerning for lateral t wave changes in the setting of known LAD stents.  Denies chest pain  --ASA  --consult cardiology  --trend troponin levels  --TTE    Oncology  Normocytic anemia  --suspect secondary to prolonged hospitalization with critical illness and recent rectal bleeding.  --hb below 7 this morning. Transfused 2 units of PRBCs.   --No current signs of bleeding.     Endocrine  Type 2 diabetes mellitus, with long-term  current use of insulin  --A1c 5.4.    --frequent glucose checks with SSI      Critical Care Time: 45 minutes  Critical secondary to Patient has a condition that poses threat to life and bodily function: Severe Respiratory Distress      Critical care was time spent personally by me on the following activities: development of treatment plan with patient or surrogate and bedside caregivers, discussions with consultants, evaluation of patient's response to treatment, examination of patient, ordering and performing treatments and interventions, ordering and review of laboratory studies, ordering and review of radiographic studies, pulse oximetry, re-evaluation of patient's condition. This critical care time did not overlap with that of any other provider or involve time for any procedures.     Gabino Brown PA-C  Critical Care Medicine  [facility]

## 2019-06-07 NOTE — ASSESSMENT & PLAN NOTE
--Suspect volume overload in the setting of NSTEMI.  --Diurese. Monitor I/O.  --Had to be placed back on mechanical ventilation after ~2 hours on trach collar.   --Reattempt tomorrow morning.

## 2019-06-07 NOTE — HOSPITAL COURSE
Ms. Huff was admitted to the MICU on 06/06. The morning of 06/07/19 her Hb had dropped below 7 so she was transfused 2 units of PRBCs for acute blood loss from bleeding rectal ulcer. Cardiology was consulted for concern of NSTEMI 2/2 increasing Troponin; however, recommended not to treat as ACS because of bleeding and to manage with medical therapy. Repeat echo with reduced EF 40-45% from 53% and new diastolic dysfunction, wall motion abnormalities and worsening mitral regurgitation from previous echo on 05/09/19. She is diuresing well with lasix 60mg IV TID. Her sputum culture is positive for pseudomonas with resistance to pip tazo so abx changed to meropenem. ID following. Daily trach collar trials continue with a noted improvement on 06/10/19. LTACH consult placed

## 2019-06-07 NOTE — CONSULTS
"  [facility]  Adult Nutrition  Consult Note    SUMMARY     Recommendations  Recommendation/Intervention:  As medically appropriate, recommend initiating TF of Glucerna 1.5 at a goal rate of 45 mL/hr - to provide 1620 kcal/day, 89g protein/day, and 820mL free fluid/day.   RD to monitor.    Goals: Patient to receive nutrition by RD follow-up  Nutrition Goal Status: new  Communication of RD Recs: reviewed with RN    Reason for Assessment  Reason For Assessment: consult  Diagnosis: (respiratory failure)  Relevant Medical History: CAD, DM2, HTN, HLD  General Information Comments: Trach/vent. RN reports patient was complaining of nausea, plan to start TF at a trickle. Patient with expected muscle wasting associated with bedbound status and no significant weight loss PTA.  Nutrition Discharge Planning: Adequate nutrition via TF.    Nutrition Risk Screen  Nutrition Risk Screen: dysphagia or difficulty swallowing, tube feeding or parenteral nutrition    Nutrition/Diet History  Factors Affecting Nutritional Intake: NPO, on mechanical ventilation    Anthropometrics  Temp: 99.4 °F (37.4 °C)  Height Method: Estimated  Height: 5' 5" (165.1 cm)  Height (inches): 65 in  Weight Method: Bed Scale  Weight: 69.9 kg (154 lb)  Weight (lb): 154 lb  Ideal Body Weight (IBW), Female: 125 lb  % Ideal Body Weight, Female (lb): 123.2 lb  BMI (Calculated): 25.7  BMI Grade: 25 - 29.9 - overweight    Lab/Procedures/Meds  Pertinent Labs Reviewed: reviewed  Pertinent Labs Comments: Na 147, BUN 49, Glu 137, POCT Glu 172-210, HgbA1c 5.4, Ca 8.6, Alb 1.7  Pertinent Medications Reviewed: reviewed  Pertinent Medications Comments: famotidine, precedex, levophed    Estimated/Assessed Needs  Weight Used For Calorie Calculations: 70.3 kg (154 lb 15.7 oz)  Energy Calorie Requirements (kcal): 1648 kcal/day  Energy Need Method: [facility]  Protein Requirements: 85-99 g/day(1.2-1.4 g/kg)  Weight Used For Protein Calculations: 70.3 kg (154 lb " 15.7 oz)  Fluid Requirements (mL): 1 mL/kcal or per MD  Estimated Fluid Requirement Method: RDA Method  RDA Method (mL): 1648    Nutrition Prescription Ordered  Current Diet Order: NPO    Evaluation of Received Nutrient/Fluid Intake  I/O: Noted  Comments: LBM 6/6  % Intake of Estimated Energy Needs: 0 - 25 %  % Meal Intake: NPO    Nutrition Risk  Level of Risk/Frequency of Follow-up: high(2x/week)     Assessment and Plan  Nutrition Problem  Inadequate energy intake    Related to (etiology):   Decreased ability to consume sufficient energy    Signs and Symptoms (as evidenced by):   NPO with no alternative means of nutrition at this time    Interventions/Recommendations (treatment strategy):  Collaboration and referral of nutrition care    Nutrition Diagnosis Status:   New    Monitor and Evaluation  Food and Nutrient Intake: energy intake, enteral nutrition intake  Food and Nutrient Adminstration: enteral and parenteral nutrition administration  Anthropometric Measurements: weight, weight change  Biochemical Data, Medical Tests and Procedures: electrolyte and renal panel, gastrointestinal profile, glucose/endocrine profile, inflammatory profile  Nutrition-Focused Physical Findings: overall appearance     Nutrition Follow-Up  RD Follow-up?: Yes

## 2019-06-07 NOTE — ASSESSMENT & PLAN NOTE
--suspect secondary to prolonged hospitalization with critical illness and recent rectal bleeding.  --hb below 7 this morning. Transfused 2 units of PRBCs.   --No current signs of bleeding.

## 2019-06-07 NOTE — SUBJECTIVE & OBJECTIVE
Interim History: Overnight events noted. Results of CTA chest/abdomen/pelvis reviewed.     Troponin increased to 5.9 from 1.5.     Patient continues to deny anginal complaints. Patient's sinus tachycardia and fevers resolved.    Review of Systems   Unable to perform ROS: other (difficult to obtain due to trach status)   Constitution: Positive for fever.   Cardiovascular: Negative for chest pain, irregular heartbeat and near-syncope.   Respiratory: Positive for shortness of breath (improved).      Objective:     Vital Signs (Most Recent):  Temp: 99.4 °F (37.4 °C) (06/07/19 1005)  Pulse: 104 (06/07/19 1100)  Resp: 18 (06/07/19 1100)  BP: 111/64 (06/07/19 1005)  SpO2: 100 % (06/07/19 1100) Vital Signs (24h Range):  Temp:  [98.4 °F (36.9 °C)-101 °F (38.3 °C)] 99.4 °F (37.4 °C)  Pulse:  [] 104  Resp:  [2-58] 18  SpO2:  [91 %-100 %] 100 %  BP: ()/(38-87) 111/64     Weight: 69.9 kg (154 lb)  Body mass index is 25.63 kg/m².    SpO2: 100 %  O2 Device (Oxygen Therapy): ventilator      Intake/Output Summary (Last 24 hours) at 6/7/2019 1134  Last data filed at 6/7/2019 1100  Gross per 24 hour   Intake 1657 ml   Output 1130 ml   Net 527 ml       Lines/Drains/Airways     Peripherally Inserted Central Catheter Line                 PICC Double Lumen 05/24/19 0900 right brachial 14 days          Drain                 Nephrostomy 04/21/19 0629 Left 8 Fr. 47 days         Gastrostomy/Enterostomy 05/02/19 1134 Percutaneous endoscopic gastrostomy (PEG) LUQ decompression;feeding 36 days         Urethral Catheter 06/06/19 1835 16 Fr. less than 1 day          Airway                 Surgical Airway 06/03/19 1234 Shiley Cuffed 3 days                Physical Exam   Constitutional: She is cooperative. Ill appearance: chronic ill appearance.   HENT:   Trach status on vent   Neck: Normal range of motion. Neck supple.   Difficult to visualize JVD   Cardiovascular: Normal rate, regular rhythm and intact distal pulses.   Pulmonary/Chest:  Effort normal. No respiratory distress.   Coarse breath sounds diffusely   Genitourinary:   Genitourinary Comments: L nephrostomy tube in place   Musculoskeletal: She exhibits no edema.   Neurological: She is alert.   Skin: Skin is warm and dry. No erythema.   Psychiatric: She has a normal mood and affect. Her behavior is normal.       Significant Labs: All pertinent lab results from the last 24 hours have been reviewed.    Significant Imaging: Reviewed in EPIC.

## 2019-06-07 NOTE — HPI
Ms. Huff is a 60 y/o female, known to ID throughout prolonged hospital admission,  with HTN, DMII, CAD, NSTEMI, s/p L5-S1 OLIF with NSGY 4/12, complicated by a left ureter transection, repaired with ureteroureteral anastomoses and ureteral stent placement.   She was discharged with a correa and MADHURI drain that was removed on 4/16.  She was seen by urology and anticipated stent removal in 2-3 months (6/2019).  She presented to ED on 4/20/19  with AMS and sepsis.   An MRI at that time showed postsurgical change of L5-S1 posterior spinal fusion and anterior interbody fusion and a 4.4 cm fluid collection in the left anterior prevertebral soft tissues at the level of the L5-S1 disc space.  A CT showed air collection within the anterior paraspinous soft tissues through which the left ureter courses. Given that the ureter courses through this, communication of the ureter with this collection was not excluded.  She was taken to OR for washout and she underwent ex-lap of left ureter and left nephrostomy tube placement 4/21/19.   Blood cultures were positive for E coli.  Urine cultures +E.coli and OR cultures were + for Staph lugdenensis (pan sensitive).   ID was consulted at that time (see note of 4/22).  There was concern for hardware involvement and she was ultimately treated with ceftriaxone and rifampin with plan for 6 weeks of IV antibiotics (end date 6/4/19) with plan for oral suppressive therapy thereafter.         ID was reconsulted on 4/29/16 for fevers and leukocytosis.   Patient was broadened to Vanc, CTX and Rifampin, and later vanc was stopped.  Source of fevers and leukocytosis were unclear, though abdominal source was suspected.  Blood cultures were negative, CT abd showed a superficial fluid collection, C diff was negative, lines were changed, BAL was negative. She was noted to have a partially occluded thrombus in the RIJ and proximal subclavian.  Drug reaction was considered.  Patient improved and  leukocytosis and fever resolved.    ID signed off with plans to complete Cefazolin and rifampin for 6 weeks through 6/4.     ID was again re-consulted on 5/20 for recurrent fever and leukocytosis.  Abdominal drain noted to be draining pus, she had some rectal bleeding, and CXR showed new left basilar consolidation concerning for HAP.  Endotracheal aspirate showed Pseudomonas. Blood cultures negative. Urine showed candida glabrata.  Antibiotics were broadened to Vancomycin, cefepime, rifampin and fluconazole and lines were exchanged.  A repeat CT abd showed persistent subcutaneous fluid collection noted to be larger, but decreased fat stranding.  She was treated with 7 day course of Cefepime for HAP, a 7 day course of high dose fluconazole for candida glabrata and then transitioned back to cefazolin plus rifampin for prior E Coli and Staph lugdenensis and duration was extended to 8 week with end date of 6/18.      ID  reconsulted again on 6/2 for recurrent fever.  Workup was unremarkable at that time, she had no recurrence of fever, and she was discharged to rehab on 6/5/19.      Fevers subsequently recurred at rehab and she was sent to ED for repeat imaging.  She was stable on arrival to [facility], but while in ED she became acutely SOB with hypoxemia, became hypotensive.  Was intubated.  Noted to have significant volume overload on CXR.  Troponins elevated.      CTA was negative for PE.  Noted to have bilateral patch ground glass opacification throughout the lungs; bilateral basilar consolidation overall improved from prior CTA on 5/8; moderate bilateral pleural effusions; and persistent fluid collection along the inferor aspect of her healing midline abdominal incision - 3.6 X 8.2 X 2.5 cm - with surrounding inflammatory changes.       Micro:  Repeat blood cultures NGTD   Mini BAL - gram stain negative, culture pending  U/A - 27 WBC, no bacteria; urine culture pending

## 2019-06-07 NOTE — PROGRESS NOTES
[facility]  Cardiology  Progress Note    Patient Name: Mariann Huff  MRN: [id number]  Admission Date: 6/6/2019  Hospital Length of Stay: 1 days  Code Status: Full Code   Attending Physician: Crystal Mejía MD   Primary Care Physician: Jasbir Haney MD  Expected Discharge Date:   Principal Problem:Acute on chronic respiratory failure with hypoxia    Subjective:     Interim History: Overnight events noted.     Troponin increased to 5.9 from 1.5 however patient continues to deny anginal complaints.     Patient's sinus tachycardia and fevers resolved.     Results of CTA chest/abdomen/pelvis reviewed.     Of note she did require 1 U PRBCs this morning for Hgb 6.4. No evidence of bleeding per nursing staff.     Review of Systems   Difficult to perform ROS: other (difficult to obtain due to trach status)   Constitution: Positive for fever.   Cardiovascular: Negative for chest pain, irregular heartbeat and near-syncope.   Respiratory: Positive for shortness of breath (improved).      Objective:     Vital Signs (Most Recent):  Temp: 99.4 °F (37.4 °C) (06/07/19 1005)  Pulse: 104 (06/07/19 1100)  Resp: 18 (06/07/19 1100)  BP: 111/64 (06/07/19 1005)  SpO2: 100 % (06/07/19 1100) Vital Signs (24h Range):  Temp:  [98.4 °F (36.9 °C)-101 °F (38.3 °C)] 99.4 °F (37.4 °C)  Pulse:  [] 104  Resp:  [2-58] 18  SpO2:  [91 %-100 %] 100 %  BP: ()/(38-87) 111/64     Weight: 69.9 kg (154 lb)  Body mass index is 25.63 kg/m².    SpO2: 100 %  O2 Device (Oxygen Therapy): ventilator      Intake/Output Summary (Last 24 hours) at 6/7/2019 1134  Last data filed at 6/7/2019 1100  Gross per 24 hour   Intake 1657 ml   Output 1130 ml   Net 527 ml       Lines/Drains/Airways     Peripherally Inserted Central Catheter Line                 PICC Double Lumen 05/24/19 0900 right brachial 14 days          Drain                 Nephrostomy 04/21/19 0629 Left 8 Fr. 47 days         Gastrostomy/Enterostomy 05/02/19 1139  Percutaneous endoscopic gastrostomy (PEG) LUQ decompression;feeding 36 days         Urethral Catheter 06/06/19 1835 16 Fr. less than 1 day          Airway                 Surgical Airway 06/03/19 1234 Shiley Cuffed 3 days                Physical Exam   Constitutional: She is cooperative. Ill appearance: chronic ill appearance.   HENT:   Trach status on vent   Neck: Normal range of motion. Neck supple.   Difficult to visualize JVD   Cardiovascular: Normal rate, regular rhythm and intact distal pulses.   Pulmonary/Chest: Effort normal. No respiratory distress.   Coarse breath sounds diffusely   Genitourinary:   Genitourinary Comments: L nephrostomy tube in place   Musculoskeletal: She exhibits no edema.   Neurological: She is alert.   Skin: Skin is warm and dry. No erythema.   Psychiatric: She has a normal mood and affect. Her behavior is normal.       Significant Labs: All pertinent lab results from the last 24 hours have been reviewed.    Significant Imaging: Reviewed in EPIC.    Assessment and Plan:     1- NSTEMI  2- Acute on chronic hypoxemic respiratory failure  3- Acute on chronic heart failure  60 yo F with PMHx significant for CAD s/p PCI to LAD (2013, 2014) with known LCx 95% disease not revascularized due to issues in past, HFpEF, HTN, DLD, DM2, and recent prolonged hospitalization for sepsis 2/2 bacteremia from intra-abdominal abscess s/p I&D which was c/b prolonged resp failure s/p trach/PEG, GI bleed 2/2 rectal ulcer, and BL UE / RLE DVT s/p IVC filter who is re-admitted on 6/6 with acute hypoxemic respiratory failure.     Cardiology consulted for evaluation of troponin elevation.      Patient is asymptomatic and denies anginal complaints / index symptoms.      On admission she was febrile, tachycardic and hypotensive (requiring Levophed) with mildly elevated leukocytosis and CXR findings concerning for edema vs infection.      Suspect elevated troponin is related to demand ischemia due to hypoxemia and  persistent tachycardia in setting of known obstructive coronary disease rather than from acute plaque rupture.      Do not suspect this is ACS.      Recs:  -- Would not treat this as ACS. Furthermore due to hemoglobin drop overnight requiring PRBC transfusion and recent issues with GI bleeding do not recommend loading with antiplatelet therapy or starting heparin gtt.  -- Continue ASA 81 mg daily (if deemed safe from GI perspective) and high intensity statin therapy due to known coronary artery disease.   -- Recommend starting Lopressor 12.5 mg BID as BP allows, titrated up to goal HR 55-60s with hx underlying CAD.  -- Agree with Lasix 60 mg IV TID per primary team.   -- Monitor I/Os, daily weights, and renal function / electrolytes.  -- Follow up results of 2D echocardiogram.          Tahmina Moreno MD  Cardiology  [facility]  I have personally taken the history and examined the patient and agree with the resident's note as stated above.  As per my earlier note.

## 2019-06-07 NOTE — CONSULTS
[facility]  Cardiology  Consult Note    Patient Name: Mariann Huff  MRN: [id number]  Admission Date: 6/6/2019  Hospital Length of Stay: 0 days  Code Status: Full Code   Attending Provider: Crystal Mejía MD   Consulting Provider: Tahmina Moreno MD  Primary Care Physician: Jasbir Haney MD  Principal Problem:Acute on chronic respiratory failure with hypoxia    Patient information was obtained from patient and ER records.     Inpatient consult to Cardiology  Consult performed by: Tahmina Moreno MD  Consult ordered by: Lisa George NP        Subjective:     Chief Complaint:  Elevated troponin     HPI: Mariann Huff is a 60 yo F with PMHx significant for CAD s/p PCI/GINGER to pLAD and dLAD 2013, PCI/GINGER to ostial LAD 2014), HFpEF, DM2, HTN, DLD, MURTAZA, obesity who presented to [facility] on 6/6 with complaints of shortness of breath. Cardiology consulted for elevated troponin.     Per chart review patient had two recent hospitalizations:  >> from 4/12-4/14/19 she was admitted for L5-S1 OLIF by Neurosurgery ; post-op course c/b intraoperative L ureteral injury with ureteroureteral anastomosis and ureteral stent placement.  >> from 4/20-6/5/19 she was admitted for intra-abdominal abscess s/p washout and ligation of left ureter with nephrostomy tube placement (4/21) ; patient discharged on IV cefazolin and rifampin per ID due to E coli and staph lugdenesis bacteremia (end date 6/18). Hospital course c/b respiratory failure and difficulty weaning from vent s/p tracheostomy and PEG placement (5/2) + rectal bleeding s/p PRBC transfusion 2/2 rectal ulcer + BL UE and RLE DVT s/p IVC filter (5/29).    On arrival to [facility] patient was initially afebrile, normotensive (130/60s), and tachycardic (110-120s), and saturating 100% on vent via trach collar (15 L/min 50% FiO2). Patient later noted to be febrile (101 F) and hypotensive (80s/40s).    Labs showed slightly elevated WBC 13.5 (from 11.5 from 12.9  during prior admit). Hemoglobin relatively stable at 7.8 from 8.3. Renal function panel shows PAPA (Cr 1.4 from 0.8).     Troponin was checked and elevated at 1.5 --> 2.3. Lactic acid normal 1.8.    EKG shows sinus tachycardia 117 bpm with ST depressions in lateral leads.    CTA chest ordered (not completed). CXR obtained with evidence of ill defined patchy airspace consolidation and/or edema bilaterally (worse compared to prior on ).       Patient denies current or recent angina, states she had chest pain with her last MIs / stent placement.    Past Medical History:   Diagnosis Date    Anticoagulant long-term use     Asthma     Back pain     Bradycardia, unspecified 2019    The etiology of the bradycardic episode is unclear.  The have appear to be respiratory in origin (at least the 1st episode).  We will continue to monitor carefully.  We are awaiting evaluation by Cardiology.      CAD (coronary artery disease)     s/p stentimg  (2), (1)    Carotid artery stenosis     Diabetes mellitus type 2 in obese     HTN (hypertension), benign     Hyperlipidemia     Keloid cicatrix     NPDR (nonproliferative diabetic retinopathy) 2015    NSTEMI (non-ST elevated myocardial infarction)     Nuclear sclerosis - Right Eye 3/18/2014    Primary localized osteoarthrosis, lower leg 2014    Sleep apnea        Past Surgical History:   Procedure Laterality Date    BRONCHOSCOPY N/A 2019    Performed by Sean Ruano MD at [facility] OR 2ND FLR    CARDIAC CATHETERIZATION      cataract extraction left eye      cataracts      CATHETERIZATION, HEART, LEFT Left 2014    Performed by Wilman Kim MD at [facility] CATH LAB     SECTION, LOW TRANSVERSE      COLONOSCOPY N/A 2019    Performed by Al Alaniz MD at [facility] ENDO (2ND FLR)    CORONARY ANGIOPLASTY      Creation, Nephrostomy, Percutaneous Left 2019    Performed by Karina Surgeon at [facility] KARINA    CREATION,  TRACHEOSTOMY N/A 5/2/2019    Performed by Sean [facility] MD at [facility] OR 2ND FLR    EGD, WITH PEG TUBE INSERTION  5/2/2019    Performed by Sean Ruano MD at [facility] OR 2ND FLR    ESOPHAGOGASTRODUODENOSCOPY (EGD) N/A 8/12/2016    Performed by Gardenia Adamson MD at [facility] ENDO (4TH FLR)    EXCISION TURBINATE, SUBMUCOUS      FUSION, SPINE, LUMBAR, ANTERIOR APPROACH Left L5-S1 Anterior to Psoa Interbody Fusion, L5-S1 Posterior Instrumentation Left 4/12/2019    Performed by Mk George MD at [facility] OR 2ND FLR    HAND SURGERY Left     HAND SURGERY Right     torn ligament repair/ Dr. Yeboah    HYSTERECTOMY      Injection,steroid,epidural,transforaminal approach - Bilateral - S1 Bilateral 9/25/2018    Performed by Tl Abreu MD at [facility] PAIN MGT    Injection-steroid-epidural-caudal N/A 2/7/2019    Performed by Dave Bentley Jr., MD at [facility] PAIN MGT    INSERTION-INTRAOCULAR LENS (IOL) Right 9/1/2015    Performed by Good Domingo MD at [facility] OR 1ST FLR    LAPAROTOMY, EXPLORATORY; LIGATION OF LEFT URETER; DOUBLE J STENT REMOVAL LEFT URETER Left 4/20/2019    Performed by Paul Carlson MD at [facility] OR 2ND FLR    left foot surgery      left wrist surgery      NASAL SEPTUM SURGERY  5/7/15    PHACOEMULSIFICATION-ASPIRATION-CATARACT Right 9/1/2015    Performed by Good Domingo MD at [facility] OR 1ST FLR    REPAIR, URETER  4/12/2019    Performed by Mk George MD at [facility] OR 2ND FLR    RESECTION-TURBINATES (SMR) N/A 5/7/2015    Performed by Dileep Dubois III, MD at [facility] OR 2ND FLR    rt elbow surgery      S/P LAD COATED STENT  05/14/2010    6 total     S/P OM1 STENT  08/2003    SEPTOPLASTY N/A 5/7/2015    Performed by Dileep Dubois III, MD at [facility] OR 2ND FLR    SIGMOIDOSCOPY, FLEXIBLE N/A 5/21/2019    Performed by ALBERTO Amni MD at [facility] ENDO (2ND FLR)    SIGMOIDOSCOPY, FLEXIBLE N/A 5/13/2019    Performed by [facility]ERTO Amin [facility] at [facility] (2ND FLR)    SINUS  SURGERY      F.E.S.S.    SINUS SURGERY FUNCTIONAL ENDOSCOPIC WITH NAVIGATION WITH MAXILLARIES, ETHMOIDS, LEFT SPHENOID, LEFT LOLY N/A 5/7/2015    Performed by Dileep Dubois III, MD at [facility] OR 2ND FLR    STENT, URETERAL placement  4/12/2019    Performed by Robin Boyd MD at [facility] OR 2ND FLR    TUBAL LIGATION         Review of patient's allergies indicates:  No Known Allergies    Current Facility-Administered Medications on File Prior to Encounter   Medication    lidocaine (PF) 10 mg/ml (1%) injection 10 mg    [DISCONTINUED] acetaminophen tablet    [DISCONTINUED] albuterol-ipratropium 2.5 mg-0.5 mg/3 mL nebulizer solution    [DISCONTINUED] amLODIPine tablet    [DISCONTINUED] bacitracin ointment    [DISCONTINUED] bisacodyl suppository    [DISCONTINUED] chlorhexidine 0.12 % solution    [DISCONTINUED] dextrose 40 % gel    [DISCONTINUED] dextrose 5 % and 0.45 % NaCl infusion    [DISCONTINUED] docusate sodium capsule    [DISCONTINUED] GENERIC EXTERNAL MEDICATION    [DISCONTINUED] GENERIC EXTERNAL MEDICATION    [DISCONTINUED] GENERIC EXTERNAL MEDICATION    [DISCONTINUED] GENERIC EXTERNAL MEDICATION    [DISCONTINUED] GENERIC EXTERNAL MEDICATION    [DISCONTINUED] GENERIC EXTERNAL MEDICATION    [DISCONTINUED] GENERIC EXTERNAL MEDICATION    [DISCONTINUED] GENERIC EXTERNAL MEDICATION    [DISCONTINUED] glucagon (human recombinant) injection    [DISCONTINUED] insulin lispro injection    [DISCONTINUED] lidocaine 5 % patch    [DISCONTINUED] miconazole 2 % cream    [DISCONTINUED] ondansetron disintegrating tablet    [DISCONTINUED] oxyCODONE immediate release tablet Tab    [DISCONTINUED] pantoprazole suspension    [DISCONTINUED] scopolamine 1.3-1.5 mg (1 mg over 3 days)     Current Outpatient Medications on File Prior to Encounter   Medication Sig    ACCU-CHEK EDIN PLUS METER Misc     albuterol (PROVENTIL/VENTOLIN HFA) 90 mcg/actuation inhaler Inhale 2 puffs into the lungs every 6 (six) hours as  "needed for Wheezing.    amLODIPine (NORVASC) 10 MG tablet TAKE 1 TABLET (10 MG TOTAL) BY MOUTH ONCE DAILY.    aspirin 81 mg Tab Take 81 mg by mouth. 1 Tablet Oral Every day    atorvastatin (LIPITOR) 40 MG tablet TAKE 1 TABLET (40 MG TOTAL) BY MOUTH ONCE DAILY.    BD INSULIN PEN NEEDLE UF MINI 31 x 3/16 " Ndle USE 4 TIMES A DAY    blood sugar diagnostic (ACCU-CHEK EDIN PLUS TEST STRP) Strp TEST BLOOD SUGARS 4 TIMES DAILY    chlorthalidone (HYGROTEN) 50 MG Tab Take 1 tablet (50 mg total) by mouth once daily.    esomeprazole (NEXIUM) 40 MG capsule TAKE 1 CAPSULE (40 MG TOTAL) BY MOUTH BEFORE BREAKFAST.    fenofibrate micronized (LOFIBRA) 134 MG Cap TAKE 1 CAPSULE (134 MG TOTAL) BY MOUTH BEFORE BREAKFAST.    flash glucose scanning reader (Heart to Heart HospiceSTYLE MISTI 14 DAY READER) Misc 1 each by Misc.(Non-Drug; Combo Route) route once daily.    flash glucose sensor (FREESTYLE MISTI 14 DAY SENSOR) Kit 1 each by Misc.(Non-Drug; Combo Route) route once daily.    gabapentin (NEURONTIN) 100 MG capsule Take 2 capsules (200 mg total) by mouth every evening.    irbesartan (AVAPRO) 300 MG tablet Take 1 tablet (300 mg total) by mouth every evening.    lancets 30 gauge Misc     metoprolol succinate (TOPROL-XL) 100 MG 24 hr tablet TAKE 1 TABLET (100 MG TOTAL) BY MOUTH ONCE DAILY.    nitroGLYCERIN (NITROSTAT) 0.4 MG SL tablet Take one every 2-3 min till chestpain relief for 3 times and if still no relief, call MD or come to ED    omega-3 acid ethyl esters (LOVAZA) 1 gram capsule Take 1 g by mouth once daily.     pen needle, diabetic (BD ULTRA-FINE GRABIEL PEN NEEDLES) 32 gauge x 5/32" Ndle For use with insulin pens daily 4 times a day    tamsulosin (FLOMAX) 0.4 mg Cap Take 1 capsule (0.4 mg total) by mouth every evening.    tramadol (ULTRAM) 50 mg tablet Take 1 tablet (50 mg total) by mouth 3 (three) times daily as needed for Pain.    zolpidem (AMBIEN) 5 MG Tab Take 1 tablet (5 mg total) by mouth nightly as needed.     Family " History     Problem Relation (Age of Onset)    Diabetes Mother, Father, Sister, Brother, Sister    Heart attack Father    Heart disease Mother    Leukemia Father    No Known Problems Sister, Brother, Brother, Maternal Grandmother, Maternal Grandfather, Paternal Grandmother, Paternal Grandfather, Son, Son, Maternal Aunt, Maternal Uncle, Paternal Aunt, Paternal Uncle        Tobacco Use    Smoking status: Never Smoker    Smokeless tobacco: Never Used   Substance and Sexual Activity    Alcohol use: No     Alcohol/week: 0.0 oz    Drug use: No    Sexual activity: Yes     Partners: Male     Birth control/protection: Post-menopausal     Comment:      Review of Systems   Unable to perform ROS: other (difficult to obtain due to trach status)   Constitution: Positive for fever.   Cardiovascular: Negative for chest pain, irregular heartbeat and near-syncope.   Respiratory: Positive for shortness of breath.      Objective:     Vital Signs (Most Recent):  Temp: (!) 101 °F (38.3 °C) (06/06/19 1950)  Pulse: 81 (06/06/19 2114)  Resp: 18 (06/06/19 2114)  BP: (!) 93/53 (06/06/19 2114)  SpO2: 100 % (06/06/19 2114) Vital Signs (24h Range):  Temp:  [98.5 °F (36.9 °C)-101 °F (38.3 °C)] 101 °F (38.3 °C)  Pulse:  [] 81  Resp:  [16-58] 18  SpO2:  [91 %-100 %] 100 %  BP: ()/(38-87) 93/53     Weight: 70.9 kg (156 lb 4.9 oz)  Body mass index is 26.01 kg/m².    SpO2: 100 %  O2 Device (Oxygen Therapy): ventilator      Intake/Output Summary (Last 24 hours) at 6/6/2019 2205  Last data filed at 6/6/2019 2100  Gross per 24 hour   Intake 100 ml   Output 650 ml   Net -550 ml       Lines/Drains/Airways     Peripherally Inserted Central Catheter Line                 PICC Double Lumen 05/24/19 0900 right brachial 13 days          Drain                 Nephrostomy 04/21/19 0629 Left 8 Fr. 46 days         Gastrostomy/Enterostomy 05/02/19 1134 Percutaneous endoscopic gastrostomy (PEG) LUQ decompression;feeding 35 days         Urethral  Catheter 06/06/19 1835 16 Fr. less than 1 day          Airway                 Surgical Airway 06/03/19 1234 Shiley Cuffed 3 days                Physical Exam   Constitutional: She appears well-developed. She has a sickly appearance. No distress.   HENT:   Head: Normocephalic.   Mouth/Throat: Oropharynx is clear and moist.   Eyes: Pupils are equal, round, and reactive to light. Right eye exhibits normal extraocular motion and no nystagmus. Left eye exhibits normal extraocular motion and no nystagmus.   Cardiovascular: Regular rhythm, normal heart sounds and intact distal pulses. Tachycardia present.   Pulmonary/Chest: Tachypnea noted. She has wheezes (bilaterally). She has rales (bilaterally).   6 shiley tracheostomy.   Abdominal: Soft. Bowel sounds are normal. There is no tenderness.   Neurological: Alert, following commands.  Skin: Skin is warm and dry. She is not diaphoretic.     Significant Labs: All pertinent lab results from the last 24 hours have been reviewed.    Significant Imaging: Reviewed in EPIC.    Assessment and Plan:     Elevated troponin    58 yo F with PMHx significant for CAD s/p PCI to LAD (2013, 2014), HTN, DLD, DM2, and recent prolonged hospitalization for sepsis 2/2 bacteremia from intra-abdominal abscess s/p I&D which was c/b prolonged resp failure s/p trach/PEG, GI bleed 2/2 rectal ulcer, and BL UE / RLE DVT s/p IVC filter who is re-admitted on 6/6 with acute hypoxemic respiratory failure.    Cardiology consulted for evaluation of troponin leak.     Patient is denies anginal complaints / index symptoms.     She is febrile, tachycardic and borderline hypotensive with mildly elevated leukocytosis and CXR findings concerning for edema vs infection.     Suspect elevated troponin is related to demand ischemia due to hypoxemia and persistent tachycardia in setting of known coronary disease likely from active /recurrent infection vs PE vs pulmonary congestion.    Do not suspect this is ACS.      Recs:  -- Would not treat elevated troponin as ACS.   -- Follow up CTA chest to assess for PE as ordered per primary team ; if positive would need to be started on heparin gtt with close monitoring for recurrent GI bleeding.  -- Infectious w/u and broad spectrum abx therapy per primary team.   -- Follow up formal 2D echocardiogram.   -- Recommend resuming ASA 81 mg daily (if deemed safe from GI perspective in light of recent bleeding) and continuing with high intensity statin therapy due to hx of coronary artery disease.       Thank you for your consult.     Tahmina Moreno MD  Cardiology   [facility]  I have personally taken the history and examined the patient and agree with the resident's note as stated above.  Needs blood, repeat echo, low dose BB and treatment of infection etc.Prognosis very guarded.

## 2019-06-07 NOTE — SUBJECTIVE & OBJECTIVE
Interval History/Significant Events: No significant events overnight. Troponin peaked to 5.9 and is now trending down.       Review of Systems   Constitutional: Negative for chills and diaphoresis.   HENT: Negative for postnasal drip.    Eyes: Negative for visual disturbance.   Respiratory: Positive for shortness of breath.    Cardiovascular: Negative for chest pain.   Gastrointestinal: Negative for abdominal pain and constipation.   Genitourinary: Negative for hematuria.   Musculoskeletal: Negative for myalgias.   Skin: Negative for rash.   Neurological: Negative for headaches.   Hematological: Negative for adenopathy.     Objective:     Vital Signs (Most Recent):  Temp: 98.7 °F (37.1 °C) (06/07/19 1500)  Pulse: (!) 121 (06/07/19 1615)  Resp: (!) 52 (06/07/19 1615)  BP: (!) 175/101 (06/07/19 1615)  SpO2: 100 % (06/07/19 1615) Vital Signs (24h Range):  Temp:  [98.4 °F (36.9 °C)-101 °F (38.3 °C)] 98.7 °F (37.1 °C)  Pulse:  [] 121  Resp:  [2-58] 52  SpO2:  [90 %-100 %] 100 %  BP: ()/() 175/101   Weight: 69.9 kg (154 lb)  Body mass index is 25.63 kg/m².      Intake/Output Summary (Last 24 hours) at 6/7/2019 1630  Last data filed at 6/7/2019 1600  Gross per 24 hour   Intake 1657 ml   Output 2638 ml   Net -981 ml       Physical Exam   HENT:   Head: Normocephalic and atraumatic.   Mouth/Throat: Oropharynx is clear and moist.   Eyes: Pupils are equal, round, and reactive to light. Conjunctivae are normal. Right eye exhibits no discharge. Left eye exhibits no discharge. No scleral icterus.   Neck: Trachea normal, normal range of motion and full passive range of motion without pain. Neck supple. No JVD present. No tracheal deviation present. No thyromegaly present.   Cardiovascular: Normal rate, regular rhythm, S1 normal, S2 normal, normal heart sounds and intact distal pulses. Exam reveals no gallop and no friction rub.   No murmur heard.  Pulmonary/Chest: She is in respiratory distress. She has no  wheezes. She has no rales. She exhibits no tenderness.   Abdominal: Soft. Bowel sounds are normal. She exhibits no distension and no mass. There is no tenderness. There is no rebound and no guarding.   Musculoskeletal: Normal range of motion. She exhibits no tenderness or deformity.   Lymphadenopathy:     She has no cervical adenopathy.   Neurological: No cranial nerve deficit. Coordination normal.   Skin: Skin is warm and dry. No abrasion and no bruising noted.   Psychiatric: She has a normal mood and affect.   Vitals reviewed.      Vents:  Vent Mode: A/C (06/07/19 1531)  Ventilator Initiated: Yes (06/06/19 1416)  Set Rate: 16 bmp (06/07/19 1531)  Vt Set: 400 mL (06/07/19 1531)  Pressure Support: 10 cmH20 (06/07/19 1201)  PEEP/CPAP: 8 cmH20 (06/07/19 1531)  Oxygen Concentration (%): 92 (06/07/19 1615)  Peak Airway Pressure: 30 cmH2O (06/07/19 1531)  Plateau Pressure: 0 cmH20 (06/07/19 1531)  Total Ve: 7.07 mL (06/07/19 1531)  F/VT Ratio<105 (RSBI): (!) 91.63 (06/07/19 1531)  Lines/Drains/Airways     Peripherally Inserted Central Catheter Line                 PICC Double Lumen 05/24/19 0900 right brachial 14 days          Drain                 Nephrostomy 04/21/19 0629 Left 8 Fr. 47 days         Gastrostomy/Enterostomy 05/02/19 1134 Percutaneous endoscopic gastrostomy (PEG) LUQ decompression;feeding 36 days         Urethral Catheter 06/06/19 1835 16 Fr. less than 1 day          Airway                 Surgical Airway 06/03/19 1234 Shiley Cuffed 4 days              Significant Labs:    CBC/Anemia Profile:  Recent Labs   Lab 06/06/19  1333 06/06/19  1740 06/07/19  0442   WBC 13.54*  --  11.05   HGB 7.8*  --  6.4*   HCT 25.6*  --  21.1*     --  240   MCV 92  --  95   RDW 13.9  --  14.0   OCCULTBLOOD  --  Negative  --         Chemistries:  Recent Labs   Lab 06/06/19  1333 06/07/19  0330 06/07/19  0442    146* 147*   K 4.5 3.3* 3.5    110 110   CO2 22* 27 27   BUN 48* 50* 49*   CREATININE 1.4 1.1 1.1    CALCIUM 9.7 8.6* 8.6*   ALBUMIN 2.1* 1.6* 1.7*   PROT 8.5* 6.4 6.6   BILITOT 0.7 0.8 0.8   ALKPHOS 172* 115 121   ALT 8* 6* 6*   AST 32 29 25   MG 2.2 1.6 1.5*   PHOS 2.5* 3.1 3.2     Significant Imaging:  I have reviewed and interpreted all pertinent imaging results/findings within the past 24 hours.

## 2019-06-07 NOTE — CONSULTS
[facility]  Infectious Disease  Consult Note    Patient Name: Mariann Huff  MRN: [id number]  Admission Date: 6/6/2019  Hospital Length of Stay: 1 days  Attending Physician: Crystal Mejía MD  Primary Care Provider: Jasbir Haney MD     Isolation Status: No active isolations    Patient information was obtained from past medical records and ER records.      Consults  Assessment/Plan:     Fever     60 y/o female well known to ID with HTN, DMII, CAD, NSTEMI, s/p L5-S1 OLIF on 4/12 complicated by intraoperative left ureteral injury s/p ureteroureteral anastomoses and ureteral stent placement who initially presented on 4/20 with fevers, AMS and intraabdominal abscess, s/p washout and ligation of left ureter with nephrostomy tube placement on 4/21.  She  has had a long and complicated hospital course since that time (see HPI), including tracheostomy and PEG tube placement, bilateral upper and lower extremity DVT, s/p IVC filter placement on 5/29, rectal bleeding, rectal ulcer.  She is currently on long term antibiotic therapy for E coli bacteremia and Staph lugdenesis infection (abdominal source) with plan for subsequent suppressive therapy given concern r hardware involvement (end date 6/18/19).  Was treated for HAP (Pseudomonas) and candida glabrata UTI I late May.        ID  Was reconsulted again on 6/2 for recurrent fever.  Workup was unremarkable at that time, she had no recurrence of fever, and she was discharged to rehab on 6/5/19.  Fevers subsequently recurred at rehab and she was sent to ED on 6/6 for repeat imaging.  She was [facility] on [facility], but while in ED she became acutely SOB with hypoxemia, became hypotensive.  Was intubated.  Noted to have significant volume overload on CXR, troponins elevated, NSTEMI.  Antibiotics were broadened to vanc and cefepime.  ID re-consulted for antibiotic recommendations.      CTA was negative for PE.  Noted to have bilateral patchy ground glass  opacification throughout the lungs; bilateral basilar consolidation overall improved from prior CTA on 5/8; moderate bilateral pleural effusions. Noted to have  persistent fluid collection along the inferor aspect of her healing midline abdominal incision - 3.6 X 8.2 X 2.5 cm - with surrounding inflammatory changes.       Micro:  Repeat blood cultures NGTD   Mini BAL - gram stain negative, culture pending  U/A - 27 WBC, no bacteria; urine culture pending    Afebrile since yesterday. No leukocytosis.  She is having loose, soft stools, but not liquid.   Correa has been exchanged    Plan/recommendations:  1.  Continue IV vancomycin. Adjust dose to maintain trough 15-20  2. Continue cefepime. Recommend increasing dose to 2 grams q 12 hours (for creatinine clearance 30-60; current cr cl 54.8). If creatinine clear improves to >60, recommend increasing dose to 2 grams IV q 8 hours.  3.  Continue rifampin  mg twice a day.   4.  Recommend IR evaluation of abdominal fluid collection - can this be drained and cultured.   5.  Recommend Urology evaluation of nephrostomy tube   6.  If fevers recur, recommend removing PICC line.   7. Will follow cultures and adjust antibiotics accordingly.   8.  Will follow.  For any questions/concerns over the weekend, please page POLLY Maldonado or page ID staff on call for our service, Dr. Baptiste    Patient seen by, and plan discussed with, ID staff  Discussed with Primary Team           Thank you.   Please call for any questions or concerns.  SEBASTIAN Oakes, ANP-C  [phone]  Subjective:     Principal Problem: Acute on chronic respiratory failure with hypoxia    HPI:   Ms. Huff is a 60 y/o female, known to ID throughout prolonged hospital admission,  with HTN, DMII, CAD, NSTEMI, s/p L5-S1 OLIF with NSGY 4/12, complicated by a left ureter transection, repaired with ureteroureteral anastomoses and ureteral stent placement.   She was discharged with a correa and MADHURI drain that was  removed on 4/16.  She was seen by urology and anticipated stent removal in 2-3 months (6/2019).  She presented to ED on 4/20/19  with AMS and sepsis.   An MRI at that time showed postsurgical change of L5-S1 posterior spinal fusion and anterior interbody fusion and a 4.4 cm fluid collection in the left anterior prevertebral soft tissues at the level of the L5-S1 disc space.  A CT showed air collection within the anterior paraspinous soft tissues through which the left ureter courses. Given that the ureter courses through this, communication of the ureter with this collection was not excluded.   She was taken to OR for washout and she underwent ex-lap of left ureter and left nephrostomy tube placement 4/21/19.   Blood cultures were positive for E coli.  Urine cultures +E.coli and OR cultures were + for Staph lugdenensis (pan sensitive).   ID was consulted at that time (see note of 4/22).  There was concern for hardware involvement and she was ultimately treated with ceftriaxone and rifampin with plan for 6 weeks of IV antibiotics (end date 6/4/19) with plan for oral suppressive therapy thereafter.         ID was reconsulted on 4/29/16 for fevers and leukocytosis.   Patient was broadened to Vanc, CTX and Rifampin, and later vanc was stopped.  Source of fevers and leukocytosis were unclear, though abdominal source was suspected.  Blood cultures were negative, CT abd showed a superficial fluid collection, C diff was negative, lines were changed, BAL was negative. She was noted to have a partially occluded thrombus in the RIJ and proximal subclavian.  Drug reaction was considered.  Patient improved and leukocytosis and fever resolved.    ID signed off with plans to complete Cefazolin and rifampin for 6 weeks through 6/4.     ID was again re-consulted on 5/20 for recurrent fever and leukocytosis.  Abdominal drain noted to be draining pus, she had some rectal bleeding, and CXR showed new left basilar consolidation  concerning for HAP.  Endotracheal aspirate showed Pseudomonas. Blood cultures negative. Urine showed candida glabrata.  Antibiotics were broadened to Vancomycin, cefepime, rifampin and fluconazole and lines were exchanged.  A repeat CT abd showed persistent subcutaneous fluid collection noted to be larger, but decreased fat stranding.  She was treated with 7 day course of Cefepime for HAP, a 7 day course of high dose fluconazole for candida glabrata and then transitioned back to cefazolin plus rifampin for prior E Coli and Staph lugdenensis and duration was extended to 8 week with end date of 6/18.      ID  reconsulted again on 6/2 for recurrent fever.  Workup was unremarkable at that time, she had no recurrence of fever, and she was discharged to rehab on 6/5/19.      Fevers subsequently recurred at rehab and she was sent to ED for repeat imaging.  She was stable on arrival to [facility], but while in ED she became acutely SOB with hypoxemia, became hypotensive.  Was intubated.  Noted to have significant volume overload on CXR.  Troponins elevated.      CTA was negative for PE.  Noted to have bilateral patch ground glass opacification throughout the lungs; bilateral basilar consolidation overall improved from prior CTA on 5/8; moderate bilateral pleural effusions; and persistent fluid collection along the inferor aspect of her healing midline abdominal incision - 3.6 X 8.2 X 2.5 cm - with surrounding inflammatory changes.       Micro:  Repeat blood cultures NGTD   Mini BAL - gram stain negative, culture pending  U/A - 27 WBC, no bacteria; urine culture pending    Past Medical History:   Diagnosis Date    Anticoagulant long-term use     Asthma     Back pain     Bradycardia, unspecified 5/8/2019    The etiology of the bradycardic episode is unclear.  The have appear to be respiratory in origin (at least the 1st episode).  We will continue to monitor carefully.  We are awaiting evaluation by Cardiology.      CAD  (coronary artery disease)     s/p stentimg  (2), (1)    Carotid artery stenosis     Diabetes mellitus type 2 in obese     HTN (hypertension), benign     Hyperlipidemia     Keloid cicatrix     NPDR (nonproliferative diabetic retinopathy) 2015    NSTEMI (non-ST elevated myocardial infarction)     Nuclear sclerosis - Right Eye 3/18/2014    Primary localized osteoarthrosis, lower leg 2014    Sleep apnea        Past Surgical History:   Procedure Laterality Date    BRONCHOSCOPY N/A 2019    Performed by Sean [facility]ano MD at [facility] OR 2ND FLR    CARDIAC CATHETERIZATION      cataract extraction left eye      cataracts      CATHETERIZATION, HEART, LEFT Left 2014    Performed by Wilman Kim MD at [facility] CATH LAB     SECTION, LOW TRANSVERSE      COLONOSCOPY N/A 2019    Performed by Al Alaniz MD at [facility] ENDO (2ND FLR)    CORONARY ANGIOPLASTY      Creation, Nephrostomy, Percutaneous Left 2019    Performed by Karina Surgeon at [facility] KARINA    CREATION, TRACHEOSTOMY N/A 2019    Performed by Sean Ruano MD at [facility] OR 2ND FLR    EGD, WITH PEG TUBE INSERTION  2019    Performed by Sean Ruano MD at [facility] OR 2ND FLR    ESOPHAGOGASTRODUODENOSCOPY (EGD) N/A 2016    Performed by Gardenia Adamson MD at [facility] ENDO (4TH FLR)    EXCISION TURBINATE, SUBMUCOUS      FUSION, SPINE, LUMBAR, ANTERIOR APPROACH Left L5-S1 Anterior to Psoa Interbody Fusion, L5-S1 Posterior Instrumentation Left 2019    Performed by Mk George MD at [facility] OR 2ND FLR    HAND SURGERY Left     HAND SURGERY Right     torn ligament repair/ Dr. Yeboah    HYSTERECTOMY      Injection,steroid,epidural,transforaminal approach - Bilateral - S1 Bilateral 2018    Performed by Tl Abreu MD at [facility] PAIN MGT    Injection-steroid-epidural-caudal N/A 2019    Performed by Dave Bentley Jr., MD at [facility] PAIN MGT    INSERTION-INTRAOCULAR LENS (IOL)  "Right 9/1/2015    Performed by Good Domingo MD at [facility] OR 1ST FLR    LAPAROTOMY, EXPLORATORY; LIGATION OF LEFT URETER; DOUBLE J STENT REMOVAL LEFT URETER Left 4/20/2019    Performed by Paul Carlson MD at [facility] OR 2ND FLR    left foot surgery      left wrist surgery      NASAL SEPTUM SURGERY  5/7/15    PHACOEMULSIFICATION-ASPIRATION-CATARACT Right 9/1/2015    Performed by Good Domingo MD at [facility] OR 1ST FLR    REPAIR, URETER  4/12/2019    Performed by Mk George MD at [facility] OR 2ND FLR    RESECTION-TURBINATES (SMR) N/A 5/7/2015    Performed by Dileep Dubois III, MD at [facility] OR 2ND FLR    rt elbow surgery      S/P LAD COATED STENT  05/14/2010    6 total     S/P OM1 STENT  08/2003    SEPTOPLASTY N/A 5/7/2015    Performed by Dileep Dubois III, MD at [facility] OR 2ND FLR    SIGMOIDOSCOPY, FLEXIBLE N/A 5/21/2019    Performed by ALBERTO Amin MD at [facility] ENDO (2ND FLR)    SIGMOIDOSCOPY, FLEXIBLE N/A 5/13/2019    Performed by ALBERTO Amin MD at [facility] ENDO (2ND FLR)    SINUS SURGERY      F.E.S.S.    SINUS SURGERY FUNCTIONAL ENDOSCOPIC WITH NAVIGATION WITH MAXILLARIES, ETHMOIDS, LEFT SPHENOID, LEFT LOLY N/A 5/7/2015    Performed by Dileep Dubois III, MD at [facility] OR 2ND FLR    STENT, URETERAL placement  4/12/2019    Performed by Robin Boyd MD at [facility] OR 2ND FLR    TUBAL LIGATION         Review of patient's allergies indicates:  No Known Allergies    Medications:  Medications Prior to Admission   Medication Sig    ACCU-CHEK EDIN PLUS METER Misc     albuterol (PROVENTIL/VENTOLIN HFA) 90 mcg/actuation inhaler Inhale 2 puffs into the lungs every 6 (six) hours as needed for Wheezing.    amLODIPine (NORVASC) 10 MG tablet TAKE 1 TABLET (10 MG TOTAL) BY MOUTH ONCE DAILY.    aspirin 81 mg Tab Take 81 mg by mouth. 1 Tablet Oral Every day    atorvastatin (LIPITOR) 40 MG tablet TAKE 1 TABLET (40 MG TOTAL) BY MOUTH ONCE DAILY.    BD INSULIN PEN NEEDLE UF MINI 31 x 3/16 " Ndle " "USE 4 TIMES A DAY    blood sugar diagnostic (ACCU-CHEK EDIN PLUS TEST STRP) Strp TEST BLOOD SUGARS 4 TIMES DAILY    chlorthalidone (HYGROTEN) 50 MG Tab Take 1 tablet (50 mg total) by mouth once daily.    esomeprazole (NEXIUM) 40 MG capsule TAKE 1 CAPSULE (40 MG TOTAL) BY MOUTH BEFORE BREAKFAST.    fenofibrate micronized (LOFIBRA) 134 MG Cap TAKE 1 CAPSULE (134 MG TOTAL) BY MOUTH BEFORE BREAKFAST.    flash glucose scanning reader (FREESTYLE MISTI 14 DAY READER) Misc 1 each by Misc.(Non-Drug; Combo Route) route once daily.    flash glucose sensor (FREESTYLE MISTI 14 DAY SENSOR) Kit 1 each by Misc.(Non-Drug; Combo Route) route once daily.    gabapentin (NEURONTIN) 100 MG capsule Take 2 capsules (200 mg total) by mouth every evening.    irbesartan (AVAPRO) 300 MG tablet Take 1 tablet (300 mg total) by mouth every evening.    lancets 30 gauge Misc     metoprolol succinate (TOPROL-XL) 100 MG 24 hr tablet TAKE 1 TABLET (100 MG TOTAL) BY MOUTH ONCE DAILY.    nitroGLYCERIN (NITROSTAT) 0.4 MG SL tablet Take one every 2-3 min till chestpain relief for 3 times and if still no relief, call MD or come to ED    omega-3 acid ethyl esters (LOVAZA) 1 gram capsule Take 1 g by mouth once daily.     pen needle, diabetic (BD ULTRA-FINE GRABIEL PEN NEEDLES) 32 gauge x 5/32" Ndle For use with insulin pens daily 4 times a day    tamsulosin (FLOMAX) 0.4 mg Cap Take 1 capsule (0.4 mg total) by mouth every evening.    tramadol (ULTRAM) 50 mg tablet Take 1 tablet (50 mg total) by mouth 3 (three) times daily as needed for Pain.    zolpidem (AMBIEN) 5 MG Tab Take 1 tablet (5 mg total) by mouth nightly as needed.     Antibiotics (From admission, onward)    Start     Stop Route Frequency Ordered    06/07/19 0200  ceFEPIme injection 1 g      06/15 0159 IV Every 12 hours (non-standard times) 06/06/19 1629    06/06/19 2100  rifAMpin (RIFADIN) 300 mg in sodium chloride 0.9% 100 mL IVPB      -- IV Every 12 hours 06/06/19 1603    06/06/19 " 1400  vancomycin in dextrose 5 % 1 gram/250 mL IVPB 1,000 mg      -- IV Every 24 hours (non-standard times) 06/06/19 1659        Antifungals (From admission, onward)    None        Antivirals (From admission, onward)    None           Immunization History   Administered Date(s) Administered    Influenza 11/19/2009    Influenza - Quadrivalent 10/10/2014, 10/09/2015, 10/24/2016    Influenza - Quadrivalent - PF 11/27/2017    Influenza A (H1N1) 2009 Monovalent - IM 11/19/2009    Pneumococcal Polysaccharide - 23 Valent 06/19/2014    Td - PF (ADULT) 04/17/2017       Family History     Problem Relation (Age of Onset)    Diabetes Mother, Father, Sister, Brother, Sister    Heart attack Father    Heart disease Mother    Leukemia Father    No Known Problems Sister, Brother, Brother, Maternal Grandmother, Maternal Grandfather, Paternal Grandmother, Paternal Grandfather, Son, Son, Maternal Aunt, Maternal Uncle, Paternal Aunt, Paternal Uncle        Social History     Socioeconomic History    Marital status:      Spouse name: Shamir    Number of children: 2    Years of education: Not on file    Highest education level: Not on file   Occupational History    Occupation: cafeteria     Employer: [employer]     Employer: [employer]     Employer: [employer]   Social Needs    Financial resource strain: Not on file    Food insecurity:     Worry: Not on file     Inability: Not on file    Transportation needs:     Medical: Not on file     Non-medical: Not on file   Tobacco Use    Smoking status: Never Smoker    Smokeless tobacco: Never Used   Substance and Sexual Activity    Alcohol use: No     Alcohol/week: 0.0 oz    Drug use: No    Sexual activity: Yes     Partners: Male     Birth control/protection: Post-menopausal     Comment:    Lifestyle    Physical activity:     Days per week: Not on file     Minutes per session: Not on file    Stress: Not on file    Relationships    Social connections:     Talks on phone: Not on file     Gets together: Not on file     Attends Evangelical service: Not on file     Active member of club or organization: Not on file     Attends meetings of clubs or organizations: Not on file     Relationship status: Not on file   Other Topics Concern    Are you pregnant or think you may be? No    Breast-feeding No   Social History Narrative    . 2 children.      Review of Systems   Constitutional: Positive for chills and fatigue. Negative for fever.   Respiratory: Positive for shortness of breath. Negative for cough.    Cardiovascular: Negative for chest pain.   Gastrointestinal: Positive for diarrhea and nausea. Negative for abdominal pain and vomiting.   Genitourinary:        Fontenot   Neurological: Negative for headaches.   Limited ROS due to to patient acuity, difficulty communicating.  She does indicate she feels improved since admission  Objective:     Vital Signs (Most Recent):  Temp: 98.7 °F (37.1 °C) (06/07/19 1500)  Pulse: (!) 127 (06/07/19 1645)  Resp: 17 (06/07/19 1645)  BP: (!) 173/81 (06/07/19 1645)  SpO2: 95 % (06/07/19 1645) Vital Signs (24h Range):  Temp:  [98.4 °F (36.9 °C)-101 °F (38.3 °C)] 98.7 °F (37.1 °C)  Pulse:  [] 127  Resp:  [2-56] 17  SpO2:  [90 %-100 %] 95 %  BP: ()/() 173/81     Weight: 69.9 kg (154 lb)  Body mass index is 25.63 kg/m².    Estimated Creatinine Clearance: 54.7 mL/min (based on SCr of 1.1 mg/dL).    Physical Exam   Constitutional: She is oriented to person, place, and time. She appears well-developed and well-nourished. She appears distressed (anxious).   HENT:   Head: Normocephalic and atraumatic.   Tracheostomy in place - no drainage/erythema noted   Eyes: Conjunctivae are normal. Right eye exhibits no discharge. Left eye exhibits no discharge. No scleral icterus.   Cardiovascular: Regular rhythm. Tachycardia present.   Pulmonary/Chest: Tachypnea noted. She is in respiratory  distress. She has no wheezes. She has no rales.   Coarse upper airway sounds.   Diffuse crackles     Abdominal: Soft. Bowel sounds are normal. She exhibits no distension. There is no tenderness. There is no guarding.   Midline surgical  incision healed - c/d/i.   PEG site clear   Genitourinary:   Genitourinary Comments: Nephrostomy tube in place, leaking around insertion site.  No purulence noted.      Fontenot to gravity - urine orange tinged   Musculoskeletal: Normal range of motion. She exhibits no edema or tenderness.   Lymphadenopathy:     She has no cervical adenopathy.   Neurological: She is alert and oriented to person, place, and time.   Skin: Skin is warm and dry. No rash noted. She is not diaphoretic.   Back and buttocks examined.  See Wound Care photos of this date.  No signs infection.     PICC RUE - site clear, non-tender   Psychiatric: She has a normal mood and affect. Her behavior is normal.   Nursing note and vitals reviewed.      Significant Labs:   Blood Culture:   Recent Labs   Lab 05/20/19  1306 06/01/19  1712 06/01/19  1713 06/06/19  1333 06/06/19  1346   LABBLOO No growth after 5 days. No growth after 5 days. No growth after 5 days. No Growth to date  No Growth to date No Growth to date  No Growth to date     CBC:   Recent Labs   Lab 06/06/19  1333 06/07/19  0442 06/07/19  1647   WBC 13.54* 11.05 13.06*   HGB 7.8* 6.4* 10.5*   HCT 25.6* 21.1* 34.0*    240 273     CMP:   Recent Labs   Lab 06/06/19  1333 06/07/19  0330 06/07/19  0442 06/07/19  1647    146* 147*  --    K 4.5 3.3* 3.5 4.0    110 110  --    CO2 22* 27 27  --    * 143* 137*  --    BUN 48* 50* 49*  --    CREATININE 1.4 1.1 1.1  --    CALCIUM 9.7 8.6* 8.6*  --    PROT 8.5* 6.4 6.6  --    ALBUMIN 2.1* 1.6* 1.7*  --    BILITOT 0.7 0.8 0.8  --    ALKPHOS 172* 115 121  --    AST 32 29 25  --    ALT 8* 6* 6*  --    ANIONGAP 14 9 10  --    EGFRNONAA 41.4* 55.5* 55.5*  --      Respiratory Culture:   Recent Labs   Lab  04/30/19  0900 05/08/19  1300 05/19/19  1130 06/06/19  2012   GSRESP <10 epithelial cells per low power field.  No WBC's  No organisms seen Rare WBC's  No organisms seen Few WBC's  Few Gram negative rods <10 epithelial cells per low power field.  Few WBC's   No organisms seen   RESPIRATORYC  --  No growth PSEUDOMONAS AERUGINOSA  Many   No Growth     Urine Culture:   Recent Labs   Lab 04/21/19  0640 04/21/19  2256 04/25/19  0252 05/13/19  1846 05/19/19  1154   LABURIN No growth No significant growth No growth CANDIDA ALBICANS  > 100,000 cfu/ml  Treatment of asymptomatic candiduria is not recommended (except for   specific populations). Candida isolated in the urine typically   represents colonization. If an indwelling urinary catheter is present  it should be removed or replaced.  Susceptibility testing not routinely performed   CANDIDA GLABRATA  > 100,000 cfu/ml  Treatment of asymptomatic candiduria is not recommended (except for   specific populations). Candida isolated in the urine typically   represents colonization. If an indwelling urinary catheter is   present it should be removed or replaced.       Urine Studies:   Recent Labs   Lab 06/06/19  1854   COLORU Argelia   APPEARANCEUA Cloudy*   PHUR 6.0   SPECGRAV 1.010   PROTEINUA 1+*   GLUCUA Negative   KETONESU Negative   BILIRUBINUA Negative   OCCULTUA 2+*   NITRITE Negative   LEUKOCYTESUR 3+*   RBCUA 50*   WBCUA 27*   BACTERIA None   SQUAMEPITHEL 0   HYALINECASTS 0     Wound Culture:   Recent Labs   Lab 04/21/19  0125 05/19/19  1241   LABAERO STAPHYLOCOCCUS LUGDUNENSIS  Rare   No growth       Significant Imaging: I have reviewed all pertinent imaging results/findings within the past 24 hours.

## 2019-06-07 NOTE — SUBJECTIVE & OBJECTIVE
Past Medical History:   Diagnosis Date    Anticoagulant long-term use     Asthma     Back pain     Bradycardia, unspecified 2019    The etiology of the bradycardic episode is unclear.  The have appear to be respiratory in origin (at least the 1st episode).  We will continue to monitor carefully.  We are awaiting evaluation by Cardiology.      CAD (coronary artery disease)     s/p stentimg  (2), (1)    Carotid artery stenosis     Diabetes mellitus type 2 in obese     HTN (hypertension), benign     Hyperlipidemia     Keloid cicatrix     NPDR (nonproliferative diabetic retinopathy) 2015    NSTEMI (non-ST elevated myocardial infarction)     Nuclear sclerosis - Right Eye 3/18/2014    Primary localized osteoarthrosis, lower leg 2014    Sleep apnea        Past Surgical History:   Procedure Laterality Date    BRONCHOSCOPY N/A 2019    Performed by Sean Ruano MD at [facility] OR 2ND FLR    CARDIAC CATHETERIZATION      cataract extraction left eye      cataracts      CATHETERIZATION, HEART, LEFT Left 2014    Performed by Wilman Kim MD at [facility] CATH LAB     SECTION, LOW TRANSVERSE      COLONOSCOPY N/A 2019    Performed by Al Alaniz MD at [facility] ENDO (2ND FLR)    CORONARY ANGIOPLASTY      Creation, Nephrostomy, Percutaneous Left 2019    Performed by Karina Surgeon at [facility] KARINA    CREATION, TRACHEOSTOMY N/A 2019    Performed by Sean Ruano MD at [facility] OR 2ND FLR    EGD, WITH PEG TUBE INSERTION  2019    Performed by Sean Ruano MD at [facility] OR 2ND FLR    ESOPHAGOGASTRODUODENOSCOPY (EGD) N/A 2016    Performed by Gardenia Adamson MD at [facility] ENDO (4TH FLR)    EXCISION TURBINATE, SUBMUCOUS      FUSION, SPINE, LUMBAR, ANTERIOR APPROACH Left L5-S1 Anterior to Psoa Interbody Fusion, L5-S1 Posterior Instrumentation Left 2019    Performed by Mk George MD at [facility] OR 2ND FLR    HAND SURGERY Left     HAND SURGERY  Right     torn ligament repair/ Dr. Yeboah    HYSTERECTOMY      Injection,steroid,epidural,transforaminal approach - Bilateral - S1 Bilateral 9/25/2018    Performed by Tl Abreu MD at [facility] PAIN MGT    Injection-steroid-epidural-caudal N/A 2/7/2019    Performed by Dave Bentley Jr., MD at [facility] PAIN MGT    INSERTION-INTRAOCULAR LENS (IOL) Right 9/1/2015    Performed by Good Domingo MD at [facility] OR 1ST FLR    LAPAROTOMY, EXPLORATORY; LIGATION OF LEFT URETER; DOUBLE J STENT REMOVAL LEFT URETER Left 4/20/2019    Performed by Paul Carlson MD at [facility] OR 2ND FLR    left foot surgery      left wrist surgery      NASAL SEPTUM SURGERY  5/7/15    PHACOEMULSIFICATION-ASPIRATION-CATARACT Right 9/1/2015    Performed by Good Domingo MD at [facility] OR 1ST FLR    REPAIR, URETER  4/12/2019    [facility]    RESECTION-TURBINATES (SMR) N/A 5/7/2015    Performed by Dileep Dubois III, MD at [facility] OR 2ND FLR    rt elbow surgery      S/P LAD COATED STENT  05/14/2010    6 total     S/P OM1 STENT  08/2003    SEPTOPLASTY N/A 5/7/2015    Performed by Dileep Dubois III, MD at [facility] OR 2ND FLR    SIGMOIDOSCOPY, FLEXIBLE N/A 5/21/2019    Performed by ALBERTO Amin MD at [facility] ENDO (2ND FLR)    SIGMOIDOSCOPY, FLEXIBLE N/A 5/13/2019    Performed by ALBERTO Amin MD at [facility] ENDO (2ND FLR)    SINUS SURGERY      F.E.S.S.    SINUS SURGERY FUNCTIONAL ENDOSCOPIC WITH NAVIGATION WITH MAXILLARIES, ETHMOIDS, LEFT SPHENOID, LEFT LOLY N/A 5/7/2015    Performed by Dileep Dubois III, MD at [facility] OR 2ND FLR    STENT, URETERAL placement  4/12/2019    [facility]    TUBAL LIGATION         Review of patient's allergies indicates:  No Known Allergies    Medications:  Medications Prior to Admission   Medication Sig    ACCU-CHEK EDIN PLUS METER Misc     albuterol (PROVENTIL/VENTOLIN HFA) 90 mcg/actuation inhaler Inhale 2  "puffs into the lungs every 6 (six) hours as needed for Wheezing.    amLODIPine (NORVASC) 10 MG tablet TAKE 1 TABLET (10 MG TOTAL) BY MOUTH ONCE DAILY.    aspirin 81 mg Tab Take 81 mg by mouth. 1 Tablet Oral Every day    atorvastatin (LIPITOR) 40 MG tablet TAKE 1 TABLET (40 MG TOTAL) BY MOUTH ONCE DAILY.    BD INSULIN PEN NEEDLE UF MINI 31 x 3/16 " Ndle USE 4 TIMES A DAY    blood sugar diagnostic (ACCU-CHEK EDIN PLUS TEST STRP) Strp TEST BLOOD SUGARS 4 TIMES DAILY    chlorthalidone (HYGROTEN) 50 MG Tab Take 1 tablet (50 mg total) by mouth once daily.    esomeprazole (NEXIUM) 40 MG capsule TAKE 1 CAPSULE (40 MG TOTAL) BY MOUTH BEFORE BREAKFAST.    fenofibrate micronized (LOFIBRA) 134 MG Cap TAKE 1 CAPSULE (134 MG TOTAL) BY MOUTH BEFORE BREAKFAST.    flash glucose scanning reader (Happy [employer] Mus[employer] MISTI 14 DAY READER) Misc 1 each by Misc.(Non-Drug; Combo Route) route once daily.    flash glucose sensor (FREESTYLE MISTI 14 DAY SENSOR) Kit 1 each by Misc.(Non-Drug; Combo Route) route once daily.    gabapentin (NEURONTIN) 100 MG capsule Take 2 capsules (200 mg total) by mouth every evening.    irbesartan (AVAPRO) 300 MG tablet Take 1 tablet (300 mg total) by mouth every evening.    lancets 30 gauge Misc     metoprolol succinate (TOPROL-XL) 100 MG 24 hr tablet TAKE 1 TABLET (100 MG TOTAL) BY MOUTH ONCE DAILY.    nitroGLYCERIN (NITROSTAT) 0.4 MG SL tablet Take one every 2-3 min till chestpain relief for 3 times and if still no relief, call MD or come to ED    omega-3 acid ethyl esters (LOVAZA) 1 gram capsule Take 1 g by mouth once daily.     pen needle, diabetic (BD ULTRA-FINE GRABIEL PEN NEEDLES) 32 gauge x 5/32" Ndle For use with insulin pens daily 4 times a day    tamsulosin (FLOMAX) 0.4 mg Cap Take 1 capsule (0.4 mg total) by mouth every evening.    tramadol (ULTRAM) 50 mg tablet Take 1 tablet (50 mg total) by mouth 3 (three) times daily as needed for Pain.    zolpidem (AMBIEN) 5 MG Tab Take 1 tablet (5 mg " total) by mouth nightly as needed.     Antibiotics (From admission, onward)    Start     Stop Route Frequency Ordered    06/07/19 0200  ceFEPIme injection 1 g      06/15 0159 IV Every 12 hours (non-standard times) 06/06/19 1629    06/06/19 2100  rifAMpin (RIFADIN) 300 mg in sodium chloride 0.9% 100 mL IVPB      -- IV Every 12 hours 06/06/19 1603    06/06/19 1400  vancomycin in dextrose 5 % 1 gram/250 mL IVPB 1,000 mg      -- IV Every 24 hours (non-standard times) 06/06/19 1659        Antifungals (From admission, onward)    None        Antivirals (From admission, onward)    None           Immunization History   Administered Date(s) Administered    Influenza 11/19/2009    Influenza - Quadrivalent 10/10/2014, 10/09/2015, 10/24/2016    Influenza - Quadrivalent - PF 11/27/2017    Influenza A (H1N1) 2009 Monovalent - IM 11/19/2009    Pneumococcal Polysaccharide - 23 Valent 06/19/2014    Td - PF (ADULT) 04/17/2017       Family History     Problem Relation (Age of Onset)    Diabetes Mother, Father, Sister, Brother, Sister    Heart attack Father    Heart disease Mother    Leukemia Father    No Known Problems Sister, Brother, Brother, Maternal Grandmother, Maternal Grandfather, Paternal Grandmother, Paternal Grandfather, Son, Son, Maternal Aunt, Maternal Uncle, Paternal Aunt, Paternal Uncle        Social History     Socioeconomic History    Marital status:      Spouse name: Shamir    Number of children: 2    Years of education: Not on file    Highest education level: Not on file   Occupational History    Occupation: cafeteria     Employer: [employer]     Employer: [employer]     Employer: [employer]   Social Needs    Financial resource strain: Not on file    Food insecurity:     Worry: Not on file     Inability: Not on file    Transportation needs:     Medical: Not on file     Non-medical: Not on file   Tobacco Use    Smoking status: Never Smoker    Smokeless  tobacco: Never Used   Substance and Sexual Activity    Alcohol use: No     Alcohol/week: 0.0 oz    Drug use: No    Sexual activity: Yes     Partners: Male     Birth control/protection: Post-menopausal     Comment:    Lifestyle    Physical activity:     Days per week: Not on file     Minutes per session: Not on file    Stress: Not on file   Relationships    Social connections:     Talks on phone: Not on file     Gets together: Not on file     Attends Restoration service: Not on file     Active member of club or organization: Not on file     Attends meetings of clubs or organizations: Not on file     Relationship status: Not on file   Other Topics Concern    Are you pregnant or think you may be? No    Breast-feeding No   Social History Narrative    . 2 children.      Review of Systems   Constitutional: Positive for chills and fatigue. Negative for fever.   Respiratory: Positive for shortness of breath. Negative for cough.    Cardiovascular: Negative for chest pain.   Gastrointestinal: Positive for diarrhea and nausea. Negative for abdominal pain and vomiting.   Genitourinary:        Fontenot   Neurological: Negative for headaches.   Limited ROS due to to patient acuity, difficulty communicating.  She does indicate she feels improved since admission  Objective:     Vital Signs (Most Recent):  Temp: 98.7 °F (37.1 °C) (06/07/19 1500)  Pulse: (!) 127 (06/07/19 1645)  Resp: 17 (06/07/19 1645)  BP: (!) 173/81 (06/07/19 1645)  SpO2: 95 % (06/07/19 1645) Vital Signs (24h Range):  Temp:  [98.4 °F (36.9 °C)-101 °F (38.3 °C)] 98.7 °F (37.1 °C)  Pulse:  [] 127  Resp:  [2-56] 17  SpO2:  [90 %-100 %] 95 %  BP: ()/() 173/81     Weight: 69.9 kg (154 lb)  Body mass index is 25.63 kg/m².    Estimated Creatinine Clearance: 54.7 mL/min (based on SCr of 1.1 mg/dL).    Physical Exam   Constitutional: She is oriented to person, place, and time. She appears well-developed and well-nourished. She appears  distressed (anxious).   HENT:   Head: Normocephalic and atraumatic.   Tracheostomy in place - no drainage/erythema noted   Eyes: Conjunctivae are normal. Right eye exhibits no discharge. Left eye exhibits no discharge. No scleral icterus.   Cardiovascular: Regular rhythm. Tachycardia present.   Pulmonary/Chest: Tachypnea noted. She is in respiratory distress. She has no wheezes. She has no rales.   Coarse upper airway sounds.   Diffuse crackles     Abdominal: Soft. Bowel sounds are normal. She exhibits no distension. There is no tenderness. There is no guarding.   Midline surgical  incision healed - c/d/i.   PEG site clear   Genitourinary:   Genitourinary Comments: Nephrostomy tube in place, leaking around insertion site.  No purulence noted.      Fontenot to gravity - urine orange tinged   Musculoskeletal: Normal range of motion. She exhibits no edema or tenderness.   Lymphadenopathy:     She has no cervical adenopathy.   Neurological: She is alert and oriented to person, place, and time.   Skin: Skin is warm and dry. No rash noted. She is not diaphoretic.   Back and buttocks examined.  See Wound Care photos of this date.  No signs infection.     PICC RUE - site clear, non-tender   Psychiatric: She has a normal mood and affect. Her behavior is normal.   Nursing note and vitals reviewed.      Significant Labs:   Blood Culture:   Recent Labs   Lab 05/20/19  1306 06/01/19  1712 06/01/19  1713 06/06/19  1333 06/06/19  1346   LABBLOO No growth after 5 days. No growth after 5 days. No growth after 5 days. No Growth to date  No Growth to date No Growth to date  No Growth to date     CBC:   Recent Labs   Lab 06/06/19  1333 06/07/19  0442 06/07/19  1647   WBC 13.54* 11.05 13.06*   HGB 7.8* 6.4* 10.5*   HCT 25.6* 21.1* 34.0*    240 273     CMP:   Recent Labs   Lab 06/06/19  1333 06/07/19  0330 06/07/19  0442 06/07/19  1647    146* 147*  --    K 4.5 3.3* 3.5 4.0    110 110  --    CO2 22* 27 27  --    GLU  285* 143* 137*  --    BUN 48* 50* 49*  --    CREATININE 1.4 1.1 1.1  --    CALCIUM 9.7 8.6* 8.6*  --    PROT 8.5* 6.4 6.6  --    ALBUMIN 2.1* 1.6* 1.7*  --    BILITOT 0.7 0.8 0.8  --    ALKPHOS 172* 115 121  --    AST 32 29 25  --    ALT 8* 6* 6*  --    ANIONGAP 14 9 10  --    EGFRNONAA 41.4* 55.5* 55.5*  --      Respiratory Culture:   Recent Labs   Lab 04/30/19  0900 05/08/19  1300 05/19/19  1130 06/06/19  2012   GSRESP <10 epithelial cells per low power field.  No WBC's  No organisms seen Rare WBC's  No organisms seen Few WBC's  Few Gram negative rods <10 epithelial cells per low power field.  Few WBC's   No organisms seen   RESPIRATORYC  --  No growth PSEUDOMONAS AERUGINOSA  Many   No Growth     Urine Culture:   Recent Labs   Lab 04/21/19  0640 04/21/19  2256 04/25/19  0252 05/13/19  1846 05/19/19  1154   LABURIN No growth No significant growth No growth CANDIDA ALBICANS  > 100,000 cfu/ml  Treatment of asymptomatic candiduria is not recommended (except for   specific populations). Candida isolated in the urine typically   represents colonization. If an indwelling urinary catheter is present  it should be removed or replaced.  Susceptibility testing not routinely performed   CANDIDA GLABRATA  > 100,000 cfu/ml  Treatment of asymptomatic candiduria is not recommended (except for   specific populations). Candida isolated in the urine typically   represents colonization. If an indwelling urinary catheter is   present it should be removed or replaced.       Urine Studies:   Recent Labs   Lab 06/06/19  1854   COLORU Argelia   APPEARANCEUA Cloudy*   PHUR 6.0   SPECGRAV 1.010   PROTEINUA 1+*   GLUCUA Negative   KETONESU Negative   BILIRUBINUA Negative   OCCULTUA 2+*   NITRITE Negative   LEUKOCYTESUR 3+*   RBCUA 50*   WBCUA 27*   BACTERIA None   SQUAMEPITHEL 0   HYALINECASTS 0     Wound Culture:   Recent Labs   Lab 04/21/19  0125 05/19/19  1241   LABAERO STAPHYLOCOCCUS LUGDUNENSIS  Rare   No growth       Significant  Imaging: I have reviewed all pertinent imaging results/findings within the past 24 hours.

## 2019-06-07 NOTE — PROGRESS NOTES
Wound care consult for back incision.     PMH: Asthma, HTN, HLD,  CAD (LAD GINGER proximal and distal 2013 and GINGER ostial LAD 2/2014), HFpEF, NSTEMI, T2DM, Obesity, Sleep Apnea    Patient well known to wound care team from prior admission. Wounds to the back appear to be healing well. No odor or purulence noted/ Wounds cleansed and covered with bordered dressing.  Perianal excoriation present.    Heels are intact.    Recommend:  Pulsate CALLY mattress  q2hour turns  Triad paste daily to back wounds  Triad paste BID and PRN to the perianala jonny    Wound care to follow PRN.  Lanny Mireles RN [facility]   [phone]             06/07/19 1002        Incision/Site 05/02/19 1142 Left Back   Date First Assessed/Time First Assessed: 05/02/19 1142   Side: Left  Location: (c) Back   Wound Image    Incision WDL ex   Dressing Appearance Dry   Drainage Amount Scant   Drainage Characteristics/Odor Serosanguineous   Appearance Pink;Fibrin   Red (%), Wound Tissue Color 50 %   Yellow (%), Wound Tissue Color 50 %   Periwound Area Intact   Wound Edges Open   Wound Length (cm) 0.8 cm   Wound Width (cm) 0.5 cm   Wound Depth (cm) 0.1 cm   Wound Volume (cm^3) 0.04 cm^3   Wound Surface Area (cm^2) 0.4 cm^2   Care Cleansed with:;Sterile normal saline   Dressing Removed;Applied;Changed;Island/border   Dressing Change Due 06/08/19        Incision/Site 05/13/19 Right Back   Date First Assessed: 05/13/19   Side: Right  Location: Back   Wound Image    Incision WDL ex   Dressing Appearance Intact   Drainage Amount Scant   Drainage Characteristics/Odor Serosanguineous   Appearance Pink;Fibrin   Red (%), Wound Tissue Color 75 %   Yellow (%), Wound Tissue Color 25 %   Periwound Area Scar tissue   Wound Edges Open   Wound Length (cm) 1 cm   Wound Width (cm) 0.7 cm   Wound Depth (cm) 0.1 cm   Wound Volume (cm^3) 0.07 cm^3   Wound Surface Area (cm^2) 0.7 cm^2   Care Cleansed with:;Wound cleanser   Dressing Removed;Applied;Changed;Island/border   Dressing Change  Due 06/08/19        Wound 05/03/19 2000 Moisture associated dermatitis lower Buttocks   Date First Assessed/Time First Assessed: 05/03/19 2000   Primary Wound Type: Moisture associated dermatitis  Side: Left  Orientation: lower  Location: Buttocks   Wound Image    Wound WDL ex   Dressing Appearance Open to air   Drainage Amount Scant   Drainage Characteristics/Odor Serosanguineous   Appearance Other (see comments)  (denuded)   Periwound Area Moist

## 2019-06-07 NOTE — PLAN OF CARE
Jasbir Haney MD  [address] / [place] [address]    [id number] - [place] - [address]  [address]  [address]  [place] [address]  Phone: [phone] Fax: [phone]    Payor: MEDICARE / Plan: MEDICARE PART A & B / Product Type: Government /     Extended Emergency Contact Information  Primary Emergency Contact: Shamir Huff  Address: [address]            [place] [address] [address]  Home Phone: [phone]  Work Phone: [phone]  Mobile Phone: [phone]  Relation: Spouse    Future Appointments   Date Time Provider Department Center   6/18/2019 12:00 PM [facility]   6/18/2019  1:20 PM Robin Boyd MD [facility] UROLOGY [facility]   6/20/2019 10:45 AM EVGENY DAVIESY [facility] CYSTO[facility]        06/07/19 1420   Discharge Assessment   Assessment Type Discharge Planning Assessment   Confirmed/corrected address and phone number on facesheet? Yes   Assessment information obtained from? Patient;Caregiver;Medical Record   Communicated expected length of stay with patient/caregiver no   Prior to hospitilization cognitive status: Alert/Oriented   Prior to hospitalization functional status: Needs Assistance;Assistive Equipment   Current cognitive status: Alert/Oriented   Current Functional Status: Partially Dependent;Needs Assistance;Assistive Equipment   Facility Arrived From: [facility]    Lives With spouse   Able to Return to Prior Arrangements other (see comments)  (TBD)   Is patient able to care for self after discharge? Unable to determine at this time (comments)   Who are your caregiver(s) and their phone number(s)? Shamir Huff (spouse) [phone], [phone], [phone]   Patient's perception of discharge disposition rehab facility   Readmission Within the Last 30 Days previous discharge plan unsuccessful   If yes, most recent facility name: [facility]    Patient currently being followed by  outpatient case management? No   Equipment Currently Used at Home walker, rolling;shower chair;medication pump   Do you have any problems affording any of your prescribed medications? No   Is the patient taking medications as prescribed? yes   Does the patient have transportation home? Yes   Transportation Anticipated family or friend will provide   Discharge Plan A Hospice/home;Home with family   Discharge Plan B Rehab   DME Needed Upon Discharge  other (see comments)  (TBD)   Patient/Family in Agreement with Plan yes   Readmission Questionnaire   At the time of your discharge, did someone talk to you about what your health problems were? Yes   At the time of discharge, did someone talk to you about what to watch out for regarding worsening of your health problem? Yes   At the time of discharge, did someone talk to you about what to do if you experienced worsening of your health problem? Yes   At the time of discharge, did someone talk to you about which medication to take when you left the hospital and which ones to stop taking? Yes   At the time of discharge, did someone talk to you about when and where to follow up with a doctor after you left the hospital? Yes   What do you believe caused you to be sick enough to be re-admitted? Shortness of breath    Do you have problems taking your medications as prescribed? No   Do you have any problems affording any of  your prescribed medications? No   Do you have problems obtaining/receiving your medications? No   Does the patient have transportation to healthcare appointments? Yes   Living Arrangements house  (with spouse )   Does the patient have family/friends to help with healtcare needs after discharge? yes     Jes Coronado RN, [facility]  Case Management-Critical Care     [phone]

## 2019-06-08 LAB
ALBUMIN SERPL BCP-MCNC: 1.7 G/DL (ref 3.5–5.2)
ALLENS TEST: ABNORMAL
ALP SERPL-CCNC: 122 U/L (ref 55–135)
ALT SERPL W/O P-5'-P-CCNC: 9 U/L (ref 10–44)
ANION GAP SERPL CALC-SCNC: 8 MMOL/L (ref 8–16)
AST SERPL-CCNC: 24 U/L (ref 10–40)
BACTERIA UR CULT: NO GROWTH
BASOPHILS # BLD AUTO: 0.05 K/UL (ref 0–0.2)
BASOPHILS # BLD AUTO: 0.06 K/UL (ref 0–0.2)
BASOPHILS NFR BLD: 0.6 % (ref 0–1.9)
BASOPHILS NFR BLD: 0.7 % (ref 0–1.9)
BILIRUB SERPL-MCNC: 0.8 MG/DL (ref 0.1–1)
BUN SERPL-MCNC: 43 MG/DL (ref 6–20)
CALCIUM SERPL-MCNC: 8.8 MG/DL (ref 8.7–10.5)
CHLORIDE SERPL-SCNC: 115 MMOL/L (ref 95–110)
CO2 SERPL-SCNC: 26 MMOL/L (ref 23–29)
CREAT SERPL-MCNC: 1.2 MG/DL (ref 0.5–1.4)
DELSYS: ABNORMAL
DIFFERENTIAL METHOD: ABNORMAL
DIFFERENTIAL METHOD: ABNORMAL
EOSINOPHIL # BLD AUTO: 0.6 K/UL (ref 0–0.5)
EOSINOPHIL # BLD AUTO: 0.6 K/UL (ref 0–0.5)
EOSINOPHIL NFR BLD: 6.5 % (ref 0–8)
EOSINOPHIL NFR BLD: 7.1 % (ref 0–8)
ERYTHROCYTE [DISTWIDTH] IN BLOOD BY AUTOMATED COUNT: 15.9 % (ref 11.5–14.5)
ERYTHROCYTE [DISTWIDTH] IN BLOOD BY AUTOMATED COUNT: 16.2 % (ref 11.5–14.5)
EST. GFR  (AFRICAN AMERICAN): 57.6 ML/MIN/1.73 M^2
EST. GFR  (NON AFRICAN AMERICAN): 49.9 ML/MIN/1.73 M^2
FIO2: 40
GLUCOSE SERPL-MCNC: 162 MG/DL (ref 70–110)
HCO3 UR-SCNC: 28.1 MMOL/L (ref 24–28)
HCT VFR BLD AUTO: 27.6 % (ref 37–48.5)
HCT VFR BLD AUTO: 31 % (ref 37–48.5)
HGB BLD-MCNC: 8.5 G/DL (ref 12–16)
HGB BLD-MCNC: 9.6 G/DL (ref 12–16)
IMM GRANULOCYTES # BLD AUTO: 0.05 K/UL (ref 0–0.04)
IMM GRANULOCYTES # BLD AUTO: 0.05 K/UL (ref 0–0.04)
IMM GRANULOCYTES NFR BLD AUTO: 0.6 % (ref 0–0.5)
IMM GRANULOCYTES NFR BLD AUTO: 0.6 % (ref 0–0.5)
LYMPHOCYTES # BLD AUTO: 1 K/UL (ref 1–4.8)
LYMPHOCYTES # BLD AUTO: 1.1 K/UL (ref 1–4.8)
LYMPHOCYTES NFR BLD: 11.7 % (ref 18–48)
LYMPHOCYTES NFR BLD: 12.3 % (ref 18–48)
MAGNESIUM SERPL-MCNC: 2.3 MG/DL (ref 1.6–2.6)
MCH RBC QN AUTO: 27.9 PG (ref 27–31)
MCH RBC QN AUTO: 28.1 PG (ref 27–31)
MCHC RBC AUTO-ENTMCNC: 30.8 G/DL (ref 32–36)
MCHC RBC AUTO-ENTMCNC: 31 G/DL (ref 32–36)
MCV RBC AUTO: 90 FL (ref 82–98)
MCV RBC AUTO: 91 FL (ref 82–98)
MODE: ABNORMAL
MONOCYTES # BLD AUTO: 0.6 K/UL (ref 0.3–1)
MONOCYTES # BLD AUTO: 0.7 K/UL (ref 0.3–1)
MONOCYTES NFR BLD: 6.8 % (ref 4–15)
MONOCYTES NFR BLD: 8.6 % (ref 4–15)
NEUTROPHILS # BLD AUTO: 6.1 K/UL (ref 1.8–7.7)
NEUTROPHILS # BLD AUTO: 6.4 K/UL (ref 1.8–7.7)
NEUTROPHILS NFR BLD: 72 % (ref 38–73)
NEUTROPHILS NFR BLD: 72.5 % (ref 38–73)
NRBC BLD-RTO: 0 /100 WBC
NRBC BLD-RTO: 0 /100 WBC
PCO2 BLDA: 45.7 MMHG (ref 35–45)
PEEP: 5
PH SMN: 7.4 [PH] (ref 7.35–7.45)
PHOSPHATE SERPL-MCNC: 2.8 MG/DL (ref 2.7–4.5)
PLATELET # BLD AUTO: 231 K/UL (ref 150–350)
PLATELET # BLD AUTO: 250 K/UL (ref 150–350)
PMV BLD AUTO: 10.7 FL (ref 9.2–12.9)
PMV BLD AUTO: 10.9 FL (ref 9.2–12.9)
PO2 BLDA: 36 MMHG (ref 40–60)
POC BE: 3 MMOL/L
POC SATURATED O2: 68 % (ref 95–100)
POC TCO2: 30 MMOL/L (ref 24–29)
POCT GLUCOSE: 145 MG/DL (ref 70–110)
POCT GLUCOSE: 158 MG/DL (ref 70–110)
POCT GLUCOSE: 178 MG/DL (ref 70–110)
POTASSIUM SERPL-SCNC: 4.5 MMOL/L (ref 3.5–5.1)
PROT SERPL-MCNC: 6.8 G/DL (ref 6–8.4)
PS: 10
RBC # BLD AUTO: 3.03 M/UL (ref 4–5.4)
RBC # BLD AUTO: 3.44 M/UL (ref 4–5.4)
SAMPLE: ABNORMAL
SITE: ABNORMAL
SODIUM SERPL-SCNC: 149 MMOL/L (ref 136–145)
VANCOMYCIN TROUGH SERPL-MCNC: 18.8 UG/ML (ref 10–22)
WBC # BLD AUTO: 8.47 K/UL (ref 3.9–12.7)
WBC # BLD AUTO: 8.84 K/UL (ref 3.9–12.7)

## 2019-06-08 PROCEDURE — 63600175 PHARM REV CODE 636 W HCPCS: Performed by: GENERAL ACUTE CARE HOSPITAL

## 2019-06-08 PROCEDURE — 25000003 PHARM REV CODE 250: Performed by: NURSE PRACTITIONER

## 2019-06-08 PROCEDURE — 27000221 HC OXYGEN, UP TO 24 HOURS

## 2019-06-08 PROCEDURE — 99233 SBSQ HOSP IP/OBS HIGH 50: CPT | Mod: GC,,, | Performed by: INTERNAL MEDICINE

## 2019-06-08 PROCEDURE — 25000003 PHARM REV CODE 250: Performed by: STUDENT IN AN ORGANIZED HEALTH CARE EDUCATION/TRAINING PROGRAM

## 2019-06-08 PROCEDURE — 63600175 PHARM REV CODE 636 W HCPCS: Performed by: NURSE PRACTITIONER

## 2019-06-08 PROCEDURE — 99900035 HC TECH TIME PER 15 MIN (STAT)

## 2019-06-08 PROCEDURE — 25000003 PHARM REV CODE 250: Performed by: GENERAL ACUTE CARE HOSPITAL

## 2019-06-08 PROCEDURE — 94761 N-INVAS EAR/PLS OXIMETRY MLT: CPT

## 2019-06-08 PROCEDURE — 63600175 PHARM REV CODE 636 W HCPCS: Performed by: PHYSICIAN ASSISTANT

## 2019-06-08 PROCEDURE — 25000003 PHARM REV CODE 250: Performed by: PHYSICIAN ASSISTANT

## 2019-06-08 PROCEDURE — 20000000 HC ICU ROOM

## 2019-06-08 PROCEDURE — 63600175 PHARM REV CODE 636 W HCPCS: Performed by: INTERNAL MEDICINE

## 2019-06-08 PROCEDURE — 83735 ASSAY OF MAGNESIUM: CPT

## 2019-06-08 PROCEDURE — 99233 SBSQ HOSP IP/OBS HIGH 50: CPT | Mod: ,,, | Performed by: INTERNAL MEDICINE

## 2019-06-08 PROCEDURE — 63600175 PHARM REV CODE 636 W HCPCS: Performed by: STUDENT IN AN ORGANIZED HEALTH CARE EDUCATION/TRAINING PROGRAM

## 2019-06-08 PROCEDURE — 94003 VENT MGMT INPAT SUBQ DAY: CPT

## 2019-06-08 PROCEDURE — 80202 ASSAY OF VANCOMYCIN: CPT

## 2019-06-08 PROCEDURE — 82803 BLOOD GASES ANY COMBINATION: CPT

## 2019-06-08 PROCEDURE — 84100 ASSAY OF PHOSPHORUS: CPT

## 2019-06-08 PROCEDURE — 99233 PR SUBSEQUENT HOSPITAL CARE,LEVL III: ICD-10-PCS | Mod: GC,,, | Performed by: INTERNAL MEDICINE

## 2019-06-08 PROCEDURE — 99291 CRITICAL CARE FIRST HOUR: CPT | Mod: ,,, | Performed by: GENERAL ACUTE CARE HOSPITAL

## 2019-06-08 PROCEDURE — A4216 STERILE WATER/SALINE, 10 ML: HCPCS | Performed by: NURSE PRACTITIONER

## 2019-06-08 PROCEDURE — 99292 PR CRITICAL CARE, ADDL 30 MIN: ICD-10-PCS | Mod: ,,, | Performed by: INTERNAL MEDICINE

## 2019-06-08 PROCEDURE — 94640 AIRWAY INHALATION TREATMENT: CPT

## 2019-06-08 PROCEDURE — 80053 COMPREHEN METABOLIC PANEL: CPT

## 2019-06-08 PROCEDURE — 25000242 PHARM REV CODE 250 ALT 637 W/ HCPCS: Performed by: NURSE PRACTITIONER

## 2019-06-08 PROCEDURE — 25000003 PHARM REV CODE 250: Performed by: INTERNAL MEDICINE

## 2019-06-08 PROCEDURE — 99900026 HC AIRWAY MAINTENANCE (STAT)

## 2019-06-08 PROCEDURE — 99291 PR CRITICAL CARE, E/M 30-74 MINUTES: ICD-10-PCS | Mod: ,,, | Performed by: GENERAL ACUTE CARE HOSPITAL

## 2019-06-08 PROCEDURE — 27200966 HC CLOSED SUCTION SYSTEM

## 2019-06-08 PROCEDURE — 85025 COMPLETE CBC W/AUTO DIFF WBC: CPT | Mod: 91

## 2019-06-08 PROCEDURE — 99292 CRITICAL CARE ADDL 30 MIN: CPT | Mod: ,,, | Performed by: INTERNAL MEDICINE

## 2019-06-08 PROCEDURE — 99233 PR SUBSEQUENT HOSPITAL CARE,LEVL III: ICD-10-PCS | Mod: ,,, | Performed by: INTERNAL MEDICINE

## 2019-06-08 RX ORDER — POTASSIUM CHLORIDE 20 MEQ/15ML
40 SOLUTION ORAL
Status: DISCONTINUED | OUTPATIENT
Start: 2019-06-08 | End: 2019-06-12 | Stop reason: HOSPADM

## 2019-06-08 RX ORDER — ASPIRIN 81 MG/1
81 TABLET ORAL DAILY
Status: DISCONTINUED | OUTPATIENT
Start: 2019-06-08 | End: 2019-06-08

## 2019-06-08 RX ORDER — SODIUM,POTASSIUM PHOSPHATES 280-250MG
2 POWDER IN PACKET (EA) ORAL
Status: DISCONTINUED | OUTPATIENT
Start: 2019-06-08 | End: 2019-06-12 | Stop reason: HOSPADM

## 2019-06-08 RX ORDER — POTASSIUM CHLORIDE 20 MEQ/15ML
60 SOLUTION ORAL
Status: DISCONTINUED | OUTPATIENT
Start: 2019-06-08 | End: 2019-06-12 | Stop reason: HOSPADM

## 2019-06-08 RX ORDER — HEPARIN SODIUM 5000 [USP'U]/ML
5000 INJECTION, SOLUTION INTRAVENOUS; SUBCUTANEOUS EVERY 8 HOURS
Status: DISCONTINUED | OUTPATIENT
Start: 2019-06-09 | End: 2019-06-12 | Stop reason: HOSPADM

## 2019-06-08 RX ORDER — MAGNESIUM SULFATE HEPTAHYDRATE 40 MG/ML
2 INJECTION, SOLUTION INTRAVENOUS
Status: DISCONTINUED | OUTPATIENT
Start: 2019-06-08 | End: 2019-06-12 | Stop reason: HOSPADM

## 2019-06-08 RX ORDER — NAPROXEN SODIUM 220 MG/1
81 TABLET, FILM COATED ORAL DAILY
Status: DISCONTINUED | OUTPATIENT
Start: 2019-06-08 | End: 2019-06-12 | Stop reason: HOSPADM

## 2019-06-08 RX ADMIN — CHLORHEXIDINE GLUCONATE 0.12% ORAL RINSE 15 ML: 1.2 LIQUID ORAL at 09:06

## 2019-06-08 RX ADMIN — Medication 3 ML: at 02:06

## 2019-06-08 RX ADMIN — Medication 12.5 MG: at 09:06

## 2019-06-08 RX ADMIN — FUROSEMIDE 60 MG: 10 INJECTION, SOLUTION INTRAVENOUS at 10:06

## 2019-06-08 RX ADMIN — FUROSEMIDE 60 MG: 10 INJECTION, SOLUTION INTRAVENOUS at 09:06

## 2019-06-08 RX ADMIN — CEFEPIME 1 G: 1 INJECTION, POWDER, FOR SOLUTION INTRAMUSCULAR; INTRAVENOUS at 02:06

## 2019-06-08 RX ADMIN — ATORVASTATIN CALCIUM 80 MG: 20 TABLET, FILM COATED ORAL at 09:06

## 2019-06-08 RX ADMIN — ONDANSETRON 4 MG: 2 INJECTION INTRAMUSCULAR; INTRAVENOUS at 07:06

## 2019-06-08 RX ADMIN — FENTANYL CITRATE 50 MCG: 50 INJECTION INTRAMUSCULAR; INTRAVENOUS at 07:06

## 2019-06-08 RX ADMIN — IPRATROPIUM BROMIDE AND ALBUTEROL SULFATE 3 ML: .5; 3 SOLUTION RESPIRATORY (INHALATION) at 01:06

## 2019-06-08 RX ADMIN — PIPERACILLIN AND TAZOBACTAM 4.5 G: 4; .5 INJECTION, POWDER, LYOPHILIZED, FOR SOLUTION INTRAVENOUS; PARENTERAL at 07:06

## 2019-06-08 RX ADMIN — FAMOTIDINE 20 MG: 20 TABLET, FILM COATED ORAL at 09:06

## 2019-06-08 RX ADMIN — SODIUM CHLORIDE 300 MG: 9 INJECTION, SOLUTION INTRAVENOUS at 09:06

## 2019-06-08 RX ADMIN — ACETAMINOPHEN 650 MG: 650 SOLUTION ORAL at 03:06

## 2019-06-08 RX ADMIN — PROMETHAZINE HYDROCHLORIDE 6.25 MG: 25 INJECTION INTRAMUSCULAR; INTRAVENOUS at 07:06

## 2019-06-08 RX ADMIN — ASPIRIN 81 MG CHEWABLE TABLET 81 MG: 81 TABLET CHEWABLE at 09:06

## 2019-06-08 RX ADMIN — Medication 2 MG: at 07:06

## 2019-06-08 RX ADMIN — PIPERACILLIN AND TAZOBACTAM 4.5 G: 4; .5 INJECTION, POWDER, LYOPHILIZED, FOR SOLUTION INTRAVENOUS; PARENTERAL at 11:06

## 2019-06-08 RX ADMIN — MORPHINE SULFATE 2 MG: 2 INJECTION, SOLUTION INTRAMUSCULAR; INTRAVENOUS at 02:06

## 2019-06-08 RX ADMIN — MORPHINE SULFATE 2 MG: 2 INJECTION, SOLUTION INTRAMUSCULAR; INTRAVENOUS at 09:06

## 2019-06-08 RX ADMIN — IPRATROPIUM BROMIDE AND ALBUTEROL SULFATE 3 ML: .5; 3 SOLUTION RESPIRATORY (INHALATION) at 08:06

## 2019-06-08 RX ADMIN — FUROSEMIDE 60 MG: 10 INJECTION, SOLUTION INTRAVENOUS at 03:06

## 2019-06-08 RX ADMIN — INSULIN ASPART 2 UNITS: 100 INJECTION, SOLUTION INTRAVENOUS; SUBCUTANEOUS at 07:06

## 2019-06-08 RX ADMIN — VANCOMYCIN HYDROCHLORIDE 1000 MG: 1 INJECTION, POWDER, LYOPHILIZED, FOR SOLUTION INTRAVENOUS at 02:06

## 2019-06-08 NOTE — ASSESSMENT & PLAN NOTE
--Suspect volume overload in the setting of NSTEMI.  --Diurese. Monitor I/O.  --Had to be placed back on mechanical ventilation after ~2 hours on trach collar.   --Reattempt today.   --Wean ventilator as tolerated.

## 2019-06-08 NOTE — PROGRESS NOTES
Pharmacokinetic Assessment Follow Up: IV Vancomycin    Vancomycin serum concentration assessment(s):    The trough level was drawned correctly and can be used to guide therapy at this time. Vancomycin trough resulted at 18.8 mcg/mL.     Vancomycin Regimen Plan:    Continue regimen to Vancomycin 1000 mg IV every 24 hour with next serum trough concentration measured at 15-20 prior to 3rd dose on 6/11    Pharmacy will continue to follow and monitor vancomycin.    Please contact pharmacy at extension [phone] for questions regarding this assessment.    Thank you for the consult,   Gurwinder Allen     Patient brief summary:  Mariann Huff is a 59 y.o. female initiated on antimicrobial therapy with IV Vancomycin for treatment of suspected lower respiratory infection    Drug Allergies:   Review of patient's allergies indicates:  No Known Allergies    Actual Body Weight:   69.9    Renal Function:   Estimated Creatinine Clearance: 50.2 mL/min (based on SCr of 1.2 mg/dL).,     CBC (last 72 hours):  Recent Labs   Lab Result Units 06/06/19  1333 06/07/19  0442 06/07/19  1647 06/08/19  0300 06/08/19  1337   WBC K/uL 13.54* 11.05 13.06* 8.47 8.84   Hemoglobin g/dL 7.8* 6.4* 10.5* 8.5* 9.6*   Hematocrit % 25.6* 21.1* 34.0* 27.6* 31.0*   Platelets K/uL 321 240 273 231 250   Gran% % 71.4 67.0 78.4* 72.0 72.5   Lymph% % 17.1* 16.4* 8.7* 11.7* 12.3*   Mono% % 6.6 8.4 6.5 8.6 6.8   Eosinophil% % 3.7 7.1 5.2 6.5 7.1   Basophil% % 0.5 0.7 0.8 0.6 0.7   Differential Method  Automated Automated Automated Automated Automated       Metabolic Panel (last 72 hours):  Recent Labs   Lab Result Units 06/06/19  1333 06/06/19  1854 06/07/19  0330 06/07/19  0442 06/07/19  1647 06/07/19  2100 06/08/19  0300   Sodium mmol/L 142  --  146* 147*  --  146* 149*   Potassium mmol/L 4.5  --  3.3* 3.5 4.0 3.8 4.5   Chloride mmol/L 106  --  110 110  --  110 115*   CO2 mmol/L 22*  --  27 27  --  26 26   Glucose mg/dL 285*  --  143* 137*  --  273* 162*   Glucose,  UA   --  Negative  --   --   --   --   --    BUN, Bld mg/dL 48*  --  50* 49*  --  43* 43*   Creatinine mg/dL 1.4  --  1.1 1.1  --  1.3 1.2   Albumin g/dL 2.1*  --  1.6* 1.7*  --   --  1.7*   Total Bilirubin mg/dL 0.7  --  0.8 0.8  --   --  0.8   Alkaline Phosphatase U/L 172*  --  115 121  --   --  122   AST U/L 32  --  29 25  --   --  24   ALT U/L 8*  --  6* 6*  --   --  9*   Magnesium mg/dL 2.2  --  1.6 1.5*  --  1.5* 2.3   Phosphorus mg/dL 2.5*  --  3.1 3.2  --   --  2.8       Vancomycin Administrations:  vancomycin given in the last 96 hours                   vancomycin in dextrose 5 % 1 gram/250 mL IVPB 1,000 mg (mg) 1,000 mg New Bag 06/07/19 1410    vancomycin 2 g in 0.9% sodium chloride 500 mL IVPB (mg) 2,000 mg New Bag 06/06/19 1413                Drug levels (last 3 results):  Recent Labs   Lab Result Units 06/08/19  1341   Vancomycin-Trough ug/mL 18.8       Microbiologic Results:  Microbiology Results (last 7 days)     Procedure Component Value Units Date/Time    Culture, Respiratory with Gram Stain [[id number]] Collected:  06/06/19 2012    Order Status:  Completed Specimen:  Respiratory from Endotracheal Aspirate Updated:  06/08/19 1220     Respiratory Culture No S aureus isolated.     Respiratory Culture --     PRESUMPTIVE PSEUDOMONAS SPECIES  Few  Identification and susceptibility pending       Gram Stain (Respiratory) <10 epithelial cells per low power field.     Gram Stain (Respiratory) Few WBC's      Gram Stain (Respiratory) No organisms seen    Narrative:       Mini-BAL. Before antibiotics.    Urine culture [[id number]] Collected:  06/06/19 1854    Order Status:  Completed Specimen:  Urine Updated:  06/08/19 0508     Urine Culture, Routine No growth    Narrative:       Preferred Collection Type->Urine, Clean Catch    Blood culture x two cultures. Draw prior to antibiotics. [[id number]] Collected:  06/06/19 1346    Order Status:  Completed Specimen:  Blood from Peripheral, Antecubital, Left Updated:   06/07/19 1612     Blood Culture, Routine No Growth to date     Blood Culture, Routine No Growth to date    Narrative:       Aerobic and anaerobic    Blood culture x two cultures. Draw prior to antibiotics. [[id number]] Collected:  06/06/19 1333    Order Status:  Completed Specimen:  Blood from Peripheral, Hand, Left Updated:  06/07/19 1612     Blood Culture, Routine No Growth to date     Blood Culture, Routine No Growth to date    Narrative:       Aerobic and anaerobic

## 2019-06-08 NOTE — SUBJECTIVE & OBJECTIVE
Interval History: Febrile up to 103 overnight. Cultures NGTD. Cousin at bedside. Patient alert and responding to questions. No new complaints. Less anxious today.    Review of Systems   Constitutional: Positive for chills and fatigue. Negative for fever.   Respiratory: Positive for shortness of breath. Negative for cough.    Cardiovascular: Negative for chest pain.   Gastrointestinal: Positive for diarrhea and nausea. Negative for abdominal pain and vomiting.   Genitourinary:        Fontenot   Neurological: Negative for headaches.     Objective:     Vital Signs (Most Recent):  Temp: 99.3 °F (37.4 °C) (06/08/19 0700)  Pulse: 97 (06/08/19 1030)  Resp: (!) 21 (06/08/19 1030)  BP: 132/78 (06/08/19 1030)  SpO2: 98 % (06/08/19 1030) Vital Signs (24h Range):  Temp:  [98.7 °F (37.1 °C)-103.2 °F (39.6 °C)] 99.3 °F (37.4 °C)  Pulse:  [] 97  Resp:  [6-42] 21  SpO2:  [90 %-100 %] 98 %  BP: ()/() 132/78     Weight: 69.9 kg (154 lb)  Body mass index is 25.63 kg/m².    Estimated Creatinine Clearance: 50.2 mL/min (based on SCr of 1.2 mg/dL).    Physical Exam   Constitutional: She is oriented to person, place, and time. She appears well-developed and well-nourished. No distress (appears comfortable).   HENT:   Head: Normocephalic and atraumatic.   Tracheostomy in place - no drainage/erythema noted   Eyes: Conjunctivae are normal. Right eye exhibits no discharge. Left eye exhibits no discharge. No scleral icterus.   Cardiovascular: Normal rate and regular rhythm.   Pulmonary/Chest: Tachypnea noted. She is in respiratory distress. She has no wheezes. She has no rales.   Coarse upper airway sounds.   Diffuse crackles     Abdominal: Soft. She exhibits no distension. There is no tenderness. There is no guarding.   Midline surgical  incision healed - c/d/i.   PEG site clear   Genitourinary:   Genitourinary Comments: Nephrostomy tube in place, leaking around insertion site.  No purulence noted.      Fontenot to gravity - urine  orange tinged   Musculoskeletal: Normal range of motion. She exhibits no edema or tenderness.   Neurological: She is alert and oriented to person, place, and time.   Skin: Skin is warm and dry. No rash noted. She is not diaphoretic.   PICC RUE - site clear, non-tender   Psychiatric: She has a normal mood and affect. Her behavior is normal.   Nursing note and vitals reviewed.      Significant Labs: All pertinent labs within the past 24 hours have been reviewed.    Significant Imaging: I have reviewed all pertinent imaging results/findings within the past 24 hours.

## 2019-06-08 NOTE — ASSESSMENT & PLAN NOTE
60 y/o female well known to ID with HTN, DMII, CAD, NSTEMI, s/p L5-S1 OLIF on 4/12 complicated by intraoperative left ureteral injury s/p ureteroureteral anastomoses and ureteral stent placement who initially presented on 4/20 with fevers, AMS and intraabdominal abscess, s/p washout and ligation of left ureter with nephrostomy tube placement on 4/21.  She  has had a long and complicated hospital course since that time (see HPI), including tracheostomy and PEG tube placement, bilateral upper and lower extremity DVT, s/p IVC filter placement on 5/29, rectal bleeding, rectal ulcer.  She is currently on long term antibiotic therapy for E coli bacteremia and Staph lugdenesis infection (abdominal source) with plan for subsequent suppressive therapy given concern for hardware involvement (end date 6/18/19).  Was treated for HAP (Pseudomonas) and candida glabrata UTI I late May.        ID  Was reconsulted again on 6/2 for recurrent fever.  Workup was unremarkable at that time, she had no recurrence of fever, and she was discharged to rehab on 6/5/19.  Fevers subsequently recurred at rehab and she was sent to ED on 6/6 for repeat imaging.  She was stable on arrival to [facility], but while in ED she became acutely SOB with hypoxemia, became hypotensive.  Was intubated.  Noted to have significant volume overload on CXR, troponins elevated, NSTEMI.  Antibiotics were broadened to vanc and cefepime.  ID re-consulted for antibiotic recommendations.      CTA was negative for PE.  Noted to have bilateral patchy ground glass opacification throughout the lungs; bilateral basilar consolidation overall improved from prior CTA on 5/8; moderate bilateral pleural effusions. Noted to have  persistent fluid collection along the inferior aspect of her healing midline abdominal incision - 3.6 X 8.2 X 2.5 cm - with surrounding inflammatory changes.       Micro:  Repeat blood cultures NGTD   Mini BAL - cultures NGTD  U/A - 27 WBC, no bacteria;  urine culture negative    Febrile to 103 overnight. Leukocytosis resolved. She is having loose, soft stools, but not liquid.   Fontenot has been exchanged. Nephrostomy tube evaluated. Stitch added.        Plan  1.  Continue IV vancomycin for now. Adjust dose to maintain trough 15-20  2. Stop Cefepime and start IV Zosyn in setting of high fevers while awaiting culture information   3.  Continue rifampin  mg twice a day.   4.  Recommend IR evaluation to assess if abdominal fluid collection is amenable to drain and send for cultures.   5.  If fevers persist, recommend removing PICC line.   6. ID will follow.

## 2019-06-08 NOTE — SUBJECTIVE & OBJECTIVE
Interval History/Significant Events: No significant events overnight.    Review of Systems   Constitutional: Negative for chills and diaphoresis.   HENT: Negative for postnasal drip.    Eyes: Negative for visual disturbance.   Respiratory: Negative for shortness of breath.    Cardiovascular: Negative for chest pain.   Gastrointestinal: Negative for abdominal pain and constipation.   Genitourinary: Negative for hematuria.   Musculoskeletal: Negative for myalgias.   Skin: Negative for rash.   Neurological: Negative for headaches.   Hematological: Negative for adenopathy.     Objective:     Vital Signs (Most Recent):  Temp: 99 °F (37.2 °C) (06/08/19 1100)  Pulse: 93 (06/08/19 1145)  Resp: (!) 29 (06/08/19 1145)  BP: 118/65 (06/08/19 1136)  SpO2: (!) 93 % (06/08/19 1145) Vital Signs (24h Range):  Temp:  [98.7 °F (37.1 °C)-103.2 °F (39.6 °C)] 99 °F (37.2 °C)  Pulse:  [] 93  Resp:  [6-42] 29  SpO2:  [93 %-100 %] 93 %  BP: ()/() 118/65   Weight: 69.9 kg (154 lb)  Body mass index is 25.63 kg/m².      Intake/Output Summary (Last 24 hours) at 6/8/2019 1218  Last data filed at 6/8/2019 1100  Gross per 24 hour   Intake 821 ml   Output 3278 ml   Net -2457 ml       Physical Exam   HENT:   Head: Normocephalic and atraumatic.   Mouth/Throat: Oropharynx is clear and moist.   Eyes: Pupils are equal, round, and reactive to light. Conjunctivae are normal. Right eye exhibits no discharge. Left eye exhibits no discharge. No scleral icterus.   Neck: Trachea normal, normal range of motion and full passive range of motion without pain. Neck supple. No JVD present. No tracheal deviation present. No thyromegaly present.   Cardiovascular: Normal rate, regular rhythm, S1 normal, S2 normal, normal heart sounds and intact distal pulses. Exam reveals no gallop and no friction rub.   No murmur heard.  Pulmonary/Chest: She is in respiratory distress. She has no wheezes. She has no rales. She exhibits no tenderness.   Abdominal:  Soft. Bowel sounds are normal. She exhibits no distension and no mass. There is no tenderness. There is no rebound and no guarding.   Musculoskeletal: Normal range of motion. She exhibits no tenderness or deformity.   Lymphadenopathy:     She has no cervical adenopathy.   Neurological: No cranial nerve deficit. Coordination normal.   Skin: Skin is warm and dry. No abrasion and no bruising noted.   Psychiatric: She has a normal mood and affect.   Vitals reviewed.      Vents:  Vent Mode: Spont (06/08/19 1136)  Ventilator Initiated: Yes (06/06/19 1416)  Set Rate: 16 bmp (06/08/19 0718)  Vt Set: 400 mL (06/08/19 0718)  Pressure Support: 10 cmH20 (06/08/19 1136)  PEEP/CPAP: 5 cmH20 (06/08/19 1136)  Oxygen Concentration (%): 100 (06/08/19 1145)  Peak Airway Pressure: 0 cmH2O (06/08/19 1136)  Plateau Pressure: 0 cmH20 (06/08/19 1136)  Total Ve: 0 mL (06/08/19 1136)  F/VT Ratio<105 (RSBI): (!) 69.77 (06/08/19 1125)  Lines/Drains/Airways     Peripherally Inserted Central Catheter Line                 PICC Double Lumen 05/24/19 0900 right brachial 15 days          Drain                 Nephrostomy 04/21/19 0629 Left 8 Fr. 48 days         Gastrostomy/Enterostomy 05/02/19 1134 Percutaneous endoscopic gastrostomy (PEG) LUQ decompression;feeding 37 days         Urethral Catheter 06/06/19 1835 16 Fr. 1 day          Airway                 Surgical Airway 06/03/19 1234 Shiley Cuffed 4 days              Significant Labs:    CBC/Anemia Profile:  Recent Labs   Lab 06/06/19  1740 06/07/19  0442 06/07/19  1647 06/08/19  0300   WBC  --  11.05 13.06* 8.47   HGB  --  6.4* 10.5* 8.5*   HCT  --  21.1* 34.0* 27.6*   PLT  --  240 273 231   MCV  --  95 92 91   RDW  --  14.0 15.9* 15.9*   OCCULTBLOOD Negative  --   --   --         Chemistries:  Recent Labs   Lab 06/07/19  0330 06/07/19  0442 06/07/19  1647 06/07/19  2100 06/08/19  0300   * 147*  --  146* 149*   K 3.3* 3.5 4.0 3.8 4.5    110  --  110 115*   CO2 27 27  --  26 26   BUN  50* 49*  --  43* 43*   CREATININE 1.1 1.1  --  1.3 1.2   CALCIUM 8.6* 8.6*  --  9.1 8.8   ALBUMIN 1.6* 1.7*  --   --  1.7*   PROT 6.4 6.6  --   --  6.8   BILITOT 0.8 0.8  --   --  0.8   ALKPHOS 115 121  --   --  122   ALT 6* 6*  --   --  9*   AST 29 25  --   --  24   MG 1.6 1.5*  --  1.5* 2.3   PHOS 3.1 3.2  --   --  2.8     Significant Imaging:  I have reviewed and interpreted all pertinent imaging results/findings within the past 24 hours.

## 2019-06-08 NOTE — PROGRESS NOTES
Pt became agitated, tachy and spo2 87%. Pt placed back on vent on documented settings. Will continue to monitor.

## 2019-06-08 NOTE — PROGRESS NOTES
Pt removed from vent and placed on trach collar trial at 10L 40%. No complications,  and RN at bedside and will continue to monitor pt.

## 2019-06-08 NOTE — PROGRESS NOTES
[facility]  Cardiology  Progress Note    Patient Name: Mariann Huff  MRN: [id number]  Admission Date: 6/6/2019  Hospital Length of Stay: 2 days  Code Status: Full Code   Attending Physician: Crystal Mejía MD   Primary Care Physician: Jasbir Haney MD  Expected Discharge Date:   Principal Problem:Acute on chronic respiratory failure with hypoxia    Subjective:     Interim History: No new complaints, denies bleeding evetns over night. Hgb was initially 6 then increased to 10 (likely unreliable, too soon after transfusion) after 2 U PRBC and then 8. Discussed the case with family members and primary team during rounds, we agree on today's recommendations described below.     Troponins have ranged between 3.1 -> 2.6 -> 3.3 last drawns      Review of Systems   Unable to perform ROS: other (difficult to obtain due to trach status)   Constitution: Positive for fever.   Cardiovascular: Negative for chest pain, irregular heartbeat and near-syncope.   Respiratory: Positive for shortness of breath (improved).      Objective:     Vital Signs (Most Recent):  Temp: 99.3 °F (37.4 °C) (06/08/19 0700)  Pulse: 100 (06/08/19 0945)  Resp: (!) 21 (06/08/19 0945)  BP: 133/71 (06/08/19 0930)  SpO2: 98 % (06/08/19 0945) Vital Signs (24h Range):  Temp:  [98.7 °F (37.1 °C)-103.2 °F (39.6 °C)] 99.3 °F (37.4 °C)  Pulse:  [] 100  Resp:  [6-42] 21  SpO2:  [90 %-100 %] 98 %  BP: ()/() 133/71     Weight: 69.9 kg (154 lb)  Body mass index is 25.63 kg/m².    SpO2: 98 %  O2 Device (Oxygen Therapy): ventilator      Intake/Output Summary (Last 24 hours) at 6/8/2019 1011  Last data filed at 6/8/2019 0900  Gross per 24 hour   Intake 746 ml   Output 2868 ml   Net -2122 ml       Lines/Drains/Airways     Peripherally Inserted Central Catheter Line                 PICC Double Lumen 05/24/19 0900 right brachial 15 days          Drain                 Nephrostomy 04/21/19 0629 Left 8 Fr. 48 days          Gastrostomy/Enterostomy 05/02/19 1134 Percutaneous endoscopic gastrostomy (PEG) LUQ decompression;feeding 36 days         Urethral Catheter 06/06/19 1835 16 Fr. 1 day          Airway                 Surgical Airway 06/03/19 1234 Shiley Cuffed 4 days                Physical Exam   Constitutional: She is cooperative. Ill appearance: chronic ill appearance.   HENT:   Trach status on vent   Neck: Normal range of motion. Neck supple.   Difficult to visualize JVD   Cardiovascular: Normal rate, regular rhythm and intact distal pulses.   Pulmonary/Chest: Effort normal. No respiratory distress.   Coarse breath sounds diffusely   Genitourinary:   Genitourinary Comments: L nephrostomy tube in place   Musculoskeletal: She exhibits no edema.   Neurological: She is alert.   Skin: Skin is warm and dry. No erythema.   Psychiatric: She has a normal mood and affect. Her behavior is normal.       Significant Labs: All pertinent lab results from the last 24 hours have been reviewed.    Significant Imaging: Reviewed in EPIC.    Assessment and Plan:     NSTEMI (non-ST elevated myocardial infarction)  60 yo F with PMHx significant for CAD s/p PCI to LAD (2013, 2014) with known LCx 95% disease not revascularized due to issues in past, HTN, DLD, DM2, and recent prolonged hospitalization for sepsis 2/2 bacteremia from intra-abdominal abscess s/p I&D which was c/b prolonged resp failure s/p trach/PEG, GI bleed 2/2 rectal ulcer, and BL UE / RLE DVT s/p IVC filter who is re-admitted on 6/6 with acute hypoxemic respiratory failure.     Cardiology consulted for evaluation of troponin elevation.      Patient is asymptomatic and denies anginal complaints / index symptoms.      On admission she was febrile, tachycardic and hypotensive (requiring Levophed) with mildly elevated leukocytosis and CXR findings concerning for edema vs infection.      TTE showed decrease in LVEF to 40-15% with new WMA in mid inferolateral, apical lateral and mid-apical  posterior walls.     Recs:  -- Decrease ASA to 81 mg daily  -- Agree on holding clopidogrel given important recent bleeding episode  -- Continue atorvastatin 80 mg  -- Consider starting metoprolol succinate 12.5 mg daily  -- Continue current dose of furosemide as she is trending towards negative fluid balance and CXR on admission was suggestive of pulmonary edema       VTE Risk Mitigation (From admission, onward)        Ordered     heparin (porcine) injection 5,000 Units  Every 8 hours      06/08/19 0757     IP VTE LOW RISK PATIENT  Once      06/08/19 0756     Place CRISTINA hose  Until discontinued      06/06/19 1354     Place sequential compression device  Until discontinued      06/06/19 1354          Rishi Hernandez MD  Cardiology  [facility]  I have personally taken the history and examined the patient and agree with the resident's note as stated above.  Would add a low dose beta blocker now , such as 12.5 mg ( half of 25 mg) metoprolol XL.

## 2019-06-08 NOTE — PROGRESS NOTES
[facility]  Critical Care Medicine  Progress Note    Patient Name: Mariann Huff  MRN: [id number]  Admission Date: 6/6/2019  Hospital Length of Stay: 2 days  Code Status: Full Code  Attending Provider: Crystal Mejía MD  Primary Care Provider: Jasbir Haney MD   Principal Problem: Acute on chronic respiratory failure with hypoxia    Subjective:     HPI:  Mariann Huff is a 60 y/o female with history of Asthma, HTN, HLD,  CAD (LAD GINGER proximal and distal 2013 and GINGER ostial LAD 2/2014), HFpEF, NSTEMI, T2DM, Obesity, Sleep Apnea, and back pain who presented to the ED on 6/6/19 with acute shortness of breath started last night.      Mrs. Huff has had two recent hospitalizations. 4/12/19-4/14/19 for L5-S1 OLIF with Neurosurgery with intraoperaative left ureteral injury with ureteroureteral anastomosis and ureteral stent placement. Second admission, 4/20/19-6/5/19 for intraabdominal abscess s/p washout and ligation of left ureter with nephrostomy tube placement on 4/21/19 and tracheostomy and PEG placement on 5/2/19.  Most recent hospitalization complicated by BUE and RLE DVT s/p IVC filter on 5/29/19 given concern for GIB with workup revealing rectal ulcer requiring pRBC transfusion.  She was followed by ID during admission and is being treated with long term antibiotic therapy for E coli and Staph lugdenesis bacteremia with IV cefazolin and rifampin with end dated 6/18/19 given hardware.  She also completed 7 day course of cefepime for HAP (pseudomonas) on 5/25/19 and candida glabrata UTI that she received 7 days of high dose fluconazole.   She was discharged to rehab.    In the ED, she was started empirically on vancomycin and cefepime for possible PNA, CTA chest ordered for possible PE.  Labs remarkable for BNP 1403, lactate wnl, wbc 13 K, and sCr 1.4 ( from 0.8).  She was given a duo neb and placed on PSV. She is being admitted to the ICU with critical care medicine for further workup and  evaluation.      Hospital/ICU Course:  Ms. Huff was admitted to the MICU. Her troponin has continued to rise so Cardiology was consulted. Cardiology did not recommend any acute intervention or treating as ACS. The morning of 06/07/19 her Hb had dropped below 7 so she was transfused 2 units of PRBCs in the setting of elevated troponin.     Interval History/Significant Events: No significant events overnight.    Review of Systems   Constitutional: Negative for chills and diaphoresis.   HENT: Negative for postnasal drip.    Eyes: Negative for visual disturbance.   Respiratory: Negative for shortness of breath.    Cardiovascular: Negative for chest pain.   Gastrointestinal: Negative for abdominal pain and constipation.   Genitourinary: Negative for hematuria.   Musculoskeletal: Negative for myalgias.   Skin: Negative for rash.   Neurological: Negative for headaches.   Hematological: Negative for adenopathy.     Objective:     Vital Signs (Most Recent):  Temp: 99 °F (37.2 °C) (06/08/19 1100)  Pulse: 93 (06/08/19 1145)  Resp: (!) 29 (06/08/19 1145)  BP: 118/65 (06/08/19 1136)  SpO2: (!) 93 % (06/08/19 1145) Vital Signs (24h Range):  Temp:  [98.7 °F (37.1 °C)-103.2 °F (39.6 °C)] 99 °F (37.2 °C)  Pulse:  [] 93  Resp:  [6-42] 29  SpO2:  [93 %-100 %] 93 %  BP: ()/() 118/65   Weight: 69.9 kg (154 lb)  Body mass index is 25.63 kg/m².      Intake/Output Summary (Last 24 hours) at 6/8/2019 1218  Last data filed at 6/8/2019 1100  Gross per 24 hour   Intake 821 ml   Output 3278 ml   Net -2457 ml       Physical Exam   HENT:   Head: Normocephalic and atraumatic.   Mouth/Throat: Oropharynx is clear and moist.   Eyes: Pupils are equal, round, and reactive to light. Conjunctivae are normal. Right eye exhibits no discharge. Left eye exhibits no discharge. No scleral icterus.   Neck: Trachea normal, normal range of motion and full passive range of motion without pain. Neck supple. No JVD present. No tracheal deviation  present. No thyromegaly present.   Cardiovascular: Normal rate, regular rhythm, S1 normal, S2 normal, normal heart sounds and intact distal pulses. Exam reveals no gallop and no friction rub.   No murmur heard.  Pulmonary/Chest: She is in respiratory distress. She has no wheezes. She has no rales. She exhibits no tenderness.   Abdominal: Soft. Bowel sounds are normal. She exhibits no distension and no mass. There is no tenderness. There is no rebound and no guarding.   Musculoskeletal: Normal range of motion. She exhibits no tenderness or deformity.   Lymphadenopathy:     She has no cervical adenopathy.   Neurological: No cranial nerve deficit. Coordination normal.   Skin: Skin is warm and dry. No abrasion and no bruising noted.   Psychiatric: She has a normal mood and affect.   Vitals reviewed.      Vents:  Vent Mode: Spont (06/08/19 1136)  Ventilator Initiated: Yes (06/06/19 1416)  Set Rate: 16 bmp (06/08/19 0718)  Vt Set: 400 mL (06/08/19 0718)  Pressure Support: 10 cmH20 (06/08/19 1136)  PEEP/CPAP: 5 cmH20 (06/08/19 1136)  Oxygen Concentration (%): 100 (06/08/19 1145)  Peak Airway Pressure: 0 cmH2O (06/08/19 1136)  Plateau Pressure: 0 cmH20 (06/08/19 1136)  Total Ve: 0 mL (06/08/19 1136)  F/VT Ratio<105 (RSBI): (!) 69.77 (06/08/19 1125)  Lines/Drains/Airways     Peripherally Inserted Central Catheter Line                 PICC Double Lumen 05/24/19 0900 right brachial 15 days          Drain                 Nephrostomy 04/21/19 0629 Left 8 Fr. 48 days         Gastrostomy/Enterostomy 05/02/19 1134 Percutaneous endoscopic gastrostomy (PEG) LUQ decompression;feeding 37 days         Urethral Catheter 06/06/19 1835 16 Fr. 1 day          Airway                 Surgical Airway 06/03/19 1234 Shiley Cuffed 4 days              Significant Labs:    CBC/Anemia Profile:  Recent Labs   Lab 06/06/19  1740 06/07/19  0442 06/07/19  1647 06/08/19  0300   WBC  --  11.05 13.06* 8.47   HGB  --  6.4* 10.5* 8.5*   HCT  --  21.1* 34.0*  27.6*   PLT  --  240 273 231   MCV  --  95 92 91   RDW  --  14.0 15.9* 15.9*   OCCULTBLOOD Negative  --   --   --         Chemistries:  Recent Labs   Lab 06/07/19  0330 06/07/19  0442 06/07/19  1647 06/07/19  2100 06/08/19  0300   * 147*  --  146* 149*   K 3.3* 3.5 4.0 3.8 4.5    110  --  110 115*   CO2 27 27  --  26 26   BUN 50* 49*  --  43* 43*   CREATININE 1.1 1.1  --  1.3 1.2   CALCIUM 8.6* 8.6*  --  9.1 8.8   ALBUMIN 1.6* 1.7*  --   --  1.7*   PROT 6.4 6.6  --   --  6.8   BILITOT 0.8 0.8  --   --  0.8   ALKPHOS 115 121  --   --  122   ALT 6* 6*  --   --  9*   AST 29 25  --   --  24   MG 1.6 1.5*  --  1.5* 2.3   PHOS 3.1 3.2  --   --  2.8     Significant Imaging:  I have reviewed and interpreted all pertinent imaging results/findings within the past 24 hours.      ABG  Recent Labs   Lab 06/08/19  0929   PH 7.397   PO2 36*   PCO2 45.7*   HCO3 28.1*   BE 3     Assessment/Plan:     Pulmonary  * Acute on chronic respiratory failure with hypoxia  --Suspect volume overload in the setting of NSTEMI.  --Diurese. Monitor I/O.  --Had to be placed back on mechanical ventilation after ~2 hours on trach collar.   --Reattempt today.   --Wean ventilator as tolerated.     Cardiac/Vascular  (HFpEF) heart failure with preserved ejection fraction  --repeating TTE; concern for acute on chronic heart failure exacerbation    Essential hypertension  --holding antihypertensives (amlodipine and chlorthalidone).   --diuresing with lasix.       NSTEMI (non-ST elevated myocardial infarction)  --Cardiology following.   --On aspirin. Holding on anticoagulation due to recent bleeding from rectal ulcer.       CAD (coronary artery disease)  --rising troponin with EKG concerning for lateral t wave changes in the setting of known LAD stents.  Denies chest pain  --ASA  --consult cardiology  --trend troponin levels  --TTE    Oncology  Normocytic anemia  --suspect secondary to prolonged hospitalization with critical illness and recent  rectal bleeding.  --hb below 7 this morning. Transfused 2 units of PRBCs.   --No current signs of bleeding.     Endocrine  Type 2 diabetes mellitus, with long-term current use of insulin  --A1c 5.4.    --frequent glucose checks with SSI      Critical Care Time: 60 minutes  Critical secondary to Patient has a condition that poses threat to life and bodily function: Severe Respiratory Distress      Critical care was time spent personally by me on the following activities: development of treatment plan with patient or surrogate and bedside caregivers, discussions with consultants, evaluation of patient's response to treatment, examination of patient, ordering and performing treatments and interventions, ordering and review of laboratory studies, ordering and review of radiographic studies, pulse oximetry, re-evaluation of patient's condition. This critical care time did not overlap with that of any other provider or involve time for any procedures.     Gabino Brown PA-C  Critical Care Medicine  [facility]

## 2019-06-08 NOTE — PROGRESS NOTES
[facility]  Infectious Disease  Progress Note    Patient Name: Mariann Huff  MRN: [id number]  Admission Date: 6/6/2019  Length of Stay: 2 days  Attending Physician: Crystal Mejía MD  Primary Care Provider: Jasbir Haney MD    Isolation Status: No active isolations  Assessment/Plan:      Fever     60 y/o female well known to ID with HTN, DMII, CAD, NSTEMI, s/p L5-S1 OLIF on 4/12 complicated by intraoperative left ureteral injury s/p ureteroureteral anastomoses and ureteral stent placement who initially presented on 4/20 with fevers, AMS and intraabdominal abscess, s/p washout and ligation of left ureter with nephrostomy tube placement on 4/21.  She  has had a long and complicated hospital course since that time (see HPI), including tracheostomy and PEG tube placement, bilateral upper and lower extremity DVT, s/p IVC filter placement on 5/29, rectal bleeding, rectal ulcer.  She is currently on long term antibiotic therapy for E coli bacteremia and Staph lugdenesis infection (abdominal source) with plan for subsequent suppressive therapy given concern for hardware involvement (end date 6/18/19).  Was treated for HAP (Pseudomonas) and candida glabrata UTI I late May.        ID  Was reconsulted again on 6/2 for recurrent fever.  Workup was unremarkable at that time, she had no recurrence of fever, and she was discharged to rehab on 6/5/19.  Fevers subsequently recurred at rehab and she was sent to ED on 6/6 for repeat imaging.  [place] [facility] [place] she became acutely SOB with hypoxemia, became hypotensive.  Was intubated.  Noted to have significant volume overload on CXR, troponins elevated, NSTEMI.  Antibiotics were broadened to vanc and cefepime.  ID re-consulted for antibiotic recommendations.      CTA was negative for PE.  Noted to have bilateral patchy ground glass opacification throughout the lungs; bilateral basilar consolidation overall improved from prior CTA  on 5/8; moderate bilateral pleural effusions. Noted to have  persistent fluid collection along the inferior aspect of her healing midline abdominal incision - 3.6 X 8.2 X 2.5 cm - with surrounding inflammatory changes.       Micro:  Repeat blood cultures NGTD   Mini BAL - cultures NGTD  U/A - 27 WBC, no bacteria; urine culture negative    Febrile to 103 overnight. Leukocytosis resolved. She is having loose, soft stools, but not liquid.   Crorea has been exchanged. Nephrostomy tube evaluated. Stitch added.        Plan  1.  Continue IV vancomycin for now. Adjust dose to maintain trough 15-20  2. Stop Cefepime and start IV Zosyn in setting of high fevers while awaiting culture information   3.  Continue rifampin  mg twice a day.   4.  Recommend IR evaluation to assess if abdominal fluid collection is amenable to drain and send for cultures.   5.  If fevers persist, recommend removing PICC line.   6. ID will follow.            Please call for any questions. Thank you.  Geneva Bran PA-C  Phone: [phone]  Pager: [phone]      Subjective:     Principal Problem:Acute on chronic respiratory failure with hypoxia    HPI:   Ms. Huff is a 58 y/o female, known to ID throughout prolonged hospital admission,  with HTN, DMII, CAD, NSTEMI, s/p L5-S1 OLIF with NSGY 4/12, complicated by a left ureter transection, repaired with ureteroureteral anastomoses and ureteral stent placement.   She was discharged with a correa and MADHURI drain that was removed on 4/16.  She was seen by urology and anticipated stent removal in 2-3 months (6/2019).  She presented to ED on 4/20/19  with AMS and sepsis.   An MRI at that time showed postsurgical change of L5-S1 posterior spinal fusion and anterior interbody fusion and a 4.4 cm fluid collection in the left anterior prevertebral soft tissues at the level of the L5-S1 disc space.  A CT showed air collection within the anterior paraspinous soft tissues through which the left ureter courses. Given  that the ureter courses through this, communication of the ureter with this collection was not excluded.   She was taken to OR for washout and she underwent ex-lap of left ureter and left nephrostomy tube placement 4/21/19.   Blood cultures were positive for E coli.  Urine cultures +E.coli and OR cultures were + for Staph lugdenensis (pan sensitive).   ID was consulted at that time (see note of 4/22).  There was concern for hardware involvement and she was ultimately treated with ceftriaxone and rifampin with plan for 6 weeks of IV antibiotics (end date 6/4/19) with plan for oral suppressive therapy thereafter.         ID was reconsulted on 4/29/16 for fevers and leukocytosis.   Patient was broadened to Vanc, CTX and Rifampin, and later vanc was stopped.  Source of fevers and leukocytosis were unclear, though abdominal source was suspected.  Blood cultures were negative, CT abd showed a superficial fluid collection, C diff was negative, lines were changed, BAL was negative. She was noted to have a partially occluded thrombus in the RIJ and proximal subclavian.  Drug reaction was considered.  Patient improved and leukocytosis and fever resolved.    ID signed off with plans to complete Cefazolin and rifampin for 6 weeks through 6/4.     ID was again re-consulted on 5/20 for recurrent fever and leukocytosis.  Abdominal drain noted to be draining pus, she had some rectal bleeding, and CXR showed new left basilar consolidation concerning for HAP.  Endotracheal aspirate showed Pseudomonas. Blood cultures negative. Urine showed candida glabrata.  Antibiotics were broadened to Vancomycin, cefepime, rifampin and fluconazole and lines were exchanged.  A repeat CT abd showed persistent subcutaneous fluid collection noted to be larger, but decreased fat stranding.  She was treated with 7 day course of Cefepime for HAP, a 7 day course of high dose fluconazole for candida glabrata and then transitioned back to cefazolin plus  rifampin for prior E Coli and Staph lugdenensis and duration was extended to 8 week with end date of 6/18.      ID  reconsulted again on 6/2 for recurrent fever.  Workup was unremarkable at that time, she had no recurrence of fever, and she was discharged to rehab on 6/5/19.      Fevers subsequently recurred at rehab and she was sent to ED for repeat imaging.  [place] [facility] [place] but while in [place] she became acutely SOB with hypoxemia, became hypotensive.  Was intubated.  Noted to have significant volume overload on CXR.  Troponins elevated.      CTA was negative for PE.  Noted to have bilateral patch ground glass opacification throughout the lungs; bilateral basilar consolidation overall improved from prior CTA on 5/8; moderate bilateral pleural effusions; and persistent fluid collection along the inferor aspect of her healing midline abdominal incision - 3.6 X 8.2 X 2.5 cm - with surrounding inflammatory changes.       Micro:  Repeat blood cultures NGTD   Mini BAL - gram stain negative, culture pending  U/A - 27 WBC, no bacteria; urine culture pending  Interval History: Febrile up to 103 overnight. Cultures NGTD. Cousin at bedside. Patient alert and responding to questions. No new complaints. Less anxious today.    Review of Systems   Constitutional: Positive for chills and fatigue. Negative for fever.   Respiratory: Positive for shortness of breath. Negative for cough.    Cardiovascular: Negative for chest pain.   Gastrointestinal: Positive for diarrhea and nausea. Negative for abdominal pain and vomiting.   Genitourinary:        Fontenot   Neurological: Negative for headaches.     Objective:     Vital Signs (Most Recent):  Temp: 99.3 °F (37.4 °C) (06/08/19 0700)  Pulse: 97 (06/08/19 1030)  Resp: (!) 21 (06/08/19 1030)  BP: 132/78 (06/08/19 1030)  SpO2: 98 % (06/08/19 1030) Vital Signs (24h Range):  Temp:  [98.7 °F (37.1 °C)-103.2 °F (39.6 °C)] 99.3 °F (37.4 °C)  Pulse:  [] 97  Resp:  [6-42]  21  SpO2:  [90 %-100 %] 98 %  BP: ()/() 132/78     Weight: 69.9 kg (154 lb)  Body mass index is 25.63 kg/m².    Estimated Creatinine Clearance: 50.2 mL/min (based on SCr of 1.2 mg/dL).    Physical Exam   Constitutional: She is oriented to person, place, and time. She appears well-developed and well-nourished. No distress (appears comfortable).   HENT:   Head: Normocephalic and atraumatic.   Tracheostomy in place - no drainage/erythema noted   Eyes: Conjunctivae are normal. Right eye exhibits no discharge. Left eye exhibits no discharge. No scleral icterus.   Cardiovascular: Normal rate and regular rhythm.   Pulmonary/Chest: Tachypnea noted. She is in respiratory distress. She has no wheezes. She has no rales.   Coarse upper airway sounds.   Diffuse crackles     Abdominal: Soft. She exhibits no distension. There is no tenderness. There is no guarding.   Midline surgical  incision healed - c/d/i.   PEG site clear   Genitourinary:   Genitourinary Comments: Nephrostomy tube in place, leaking around insertion site.  No purulence noted.      Fontenot to gravity - urine orange tinged   Musculoskeletal: Normal range of motion. She exhibits no edema or tenderness.   Neurological: She is alert and oriented to person, place, and time.   Skin: Skin is warm and dry. No rash noted. She is not diaphoretic.   PICC RUE - site clear, non-tender   Psychiatric: She has a normal mood and affect. Her behavior is normal.   Nursing note and vitals reviewed.      Significant Labs: All pertinent labs within the past 24 hours have been reviewed.    Significant Imaging: I have reviewed all pertinent imaging results/findings within the past 24 hours.

## 2019-06-08 NOTE — SUBJECTIVE & OBJECTIVE
Interim History: No new complaints, denies bleeding evetns over night. Hgb was initially 6 then increased to 10 (likely unreliable, too soon after transfusion) after 2 U PRBC and then 8. Discussed the case with family members and primary team during rounds, we agree on today's recommendations described below.     Troponins have ranged between 3.1 -> 2.6 -> 3.3 last drawns      Review of Systems   Unable to perform ROS: other (difficult to obtain due to trach status)   Constitution: Positive for fever.   Cardiovascular: Negative for chest pain, irregular heartbeat and near-syncope.   Respiratory: Positive for shortness of breath (improved).      Objective:     Vital Signs (Most Recent):  Temp: 99.3 °F (37.4 °C) (06/08/19 0700)  Pulse: 100 (06/08/19 0945)  Resp: (!) 21 (06/08/19 0945)  BP: 133/71 (06/08/19 0930)  SpO2: 98 % (06/08/19 0945) Vital Signs (24h Range):  Temp:  [98.7 °F (37.1 °C)-103.2 °F (39.6 °C)] 99.3 °F (37.4 °C)  Pulse:  [] 100  Resp:  [6-42] 21  SpO2:  [90 %-100 %] 98 %  BP: ()/() 133/71     Weight: 69.9 kg (154 lb)  Body mass index is 25.63 kg/m².    SpO2: 98 %  O2 Device (Oxygen Therapy): ventilator      Intake/Output Summary (Last 24 hours) at 6/8/2019 1011  Last data filed at 6/8/2019 0900  Gross per 24 hour   Intake 746 ml   Output 2868 ml   Net -2122 ml       Lines/Drains/Airways     Peripherally Inserted Central Catheter Line                 PICC Double Lumen 05/24/19 0900 right brachial 15 days          Drain                 Nephrostomy 04/21/19 0629 Left 8 Fr. 48 days         Gastrostomy/Enterostomy 05/02/19 1134 Percutaneous endoscopic gastrostomy (PEG) LUQ decompression;feeding 36 days         Urethral Catheter 06/06/19 1835 16 Fr. 1 day          Airway                 Surgical Airway 06/03/19 1234 Shiley Cuffed 4 days                Physical Exam   Constitutional: She is cooperative. Ill appearance: chronic ill appearance.   HENT:   Trach status on vent   Neck: Normal  range of motion. Neck supple.   Difficult to visualize JVD   Cardiovascular: Normal rate, regular rhythm and intact distal pulses.   Pulmonary/Chest: Effort normal. No respiratory distress.   Coarse breath sounds diffusely   Genitourinary:   Genitourinary Comments: L nephrostomy tube in place   Musculoskeletal: She exhibits no edema.   Neurological: She is alert.   Skin: Skin is warm and dry. No erythema.   Psychiatric: She has a normal mood and affect. Her behavior is normal.       Significant Labs: All pertinent lab results from the last 24 hours have been reviewed.    Significant Imaging: Reviewed in EPIC.

## 2019-06-09 PROBLEM — R50.9 FEVER: Status: RESOLVED | Noted: 2019-06-07 | Resolved: 2019-06-09

## 2019-06-09 PROBLEM — J15.1 PNEUMONIA OF BOTH LUNGS DUE TO PSEUDOMONAS SPECIES: Status: ACTIVE | Noted: 2019-06-09

## 2019-06-09 LAB
ALBUMIN SERPL BCP-MCNC: 1.7 G/DL (ref 3.5–5.2)
ALP SERPL-CCNC: 134 U/L (ref 55–135)
ALT SERPL W/O P-5'-P-CCNC: 10 U/L (ref 10–44)
ANION GAP SERPL CALC-SCNC: 9 MMOL/L (ref 8–16)
AST SERPL-CCNC: 26 U/L (ref 10–40)
BASOPHILS # BLD AUTO: 0.06 K/UL (ref 0–0.2)
BASOPHILS NFR BLD: 0.8 % (ref 0–1.9)
BILIRUB SERPL-MCNC: 0.8 MG/DL (ref 0.1–1)
BUN SERPL-MCNC: 38 MG/DL (ref 6–20)
CALCIUM SERPL-MCNC: 8.9 MG/DL (ref 8.7–10.5)
CHLORIDE SERPL-SCNC: 112 MMOL/L (ref 95–110)
CO2 SERPL-SCNC: 28 MMOL/L (ref 23–29)
CREAT SERPL-MCNC: 1.1 MG/DL (ref 0.5–1.4)
DIFFERENTIAL METHOD: ABNORMAL
EOSINOPHIL # BLD AUTO: 0.6 K/UL (ref 0–0.5)
EOSINOPHIL NFR BLD: 8.2 % (ref 0–8)
ERYTHROCYTE [DISTWIDTH] IN BLOOD BY AUTOMATED COUNT: 16 % (ref 11.5–14.5)
EST. GFR  (AFRICAN AMERICAN): >60 ML/MIN/1.73 M^2
EST. GFR  (NON AFRICAN AMERICAN): 55.5 ML/MIN/1.73 M^2
GLUCOSE SERPL-MCNC: 161 MG/DL (ref 70–110)
HCT VFR BLD AUTO: 28 % (ref 37–48.5)
HGB BLD-MCNC: 8.4 G/DL (ref 12–16)
IMM GRANULOCYTES # BLD AUTO: 0.04 K/UL (ref 0–0.04)
IMM GRANULOCYTES NFR BLD AUTO: 0.5 % (ref 0–0.5)
LYMPHOCYTES # BLD AUTO: 1.2 K/UL (ref 1–4.8)
LYMPHOCYTES NFR BLD: 15.4 % (ref 18–48)
MAGNESIUM SERPL-MCNC: 2.6 MG/DL (ref 1.6–2.6)
MCH RBC QN AUTO: 28.7 PG (ref 27–31)
MCHC RBC AUTO-ENTMCNC: 30 G/DL (ref 32–36)
MCV RBC AUTO: 96 FL (ref 82–98)
MONOCYTES # BLD AUTO: 0.7 K/UL (ref 0.3–1)
MONOCYTES NFR BLD: 9.7 % (ref 4–15)
NEUTROPHILS # BLD AUTO: 4.9 K/UL (ref 1.8–7.7)
NEUTROPHILS NFR BLD: 65.4 % (ref 38–73)
NRBC BLD-RTO: 0 /100 WBC
PHOSPHATE SERPL-MCNC: 3.2 MG/DL (ref 2.7–4.5)
PLATELET # BLD AUTO: 234 K/UL (ref 150–350)
PMV BLD AUTO: 11.2 FL (ref 9.2–12.9)
POCT GLUCOSE: 118 MG/DL (ref 70–110)
POCT GLUCOSE: 179 MG/DL (ref 70–110)
POTASSIUM SERPL-SCNC: 4.3 MMOL/L (ref 3.5–5.1)
PROT SERPL-MCNC: 6.9 G/DL (ref 6–8.4)
RBC # BLD AUTO: 2.93 M/UL (ref 4–5.4)
SODIUM SERPL-SCNC: 149 MMOL/L (ref 136–145)
WBC # BLD AUTO: 7.45 K/UL (ref 3.9–12.7)

## 2019-06-09 PROCEDURE — 99291 PR CRITICAL CARE, E/M 30-74 MINUTES: ICD-10-PCS | Mod: ,,, | Performed by: GENERAL ACUTE CARE HOSPITAL

## 2019-06-09 PROCEDURE — 85025 COMPLETE CBC W/AUTO DIFF WBC: CPT

## 2019-06-09 PROCEDURE — 99292 PR CRITICAL CARE, ADDL 30 MIN: ICD-10-PCS | Mod: ,,, | Performed by: INTERNAL MEDICINE

## 2019-06-09 PROCEDURE — 25000003 PHARM REV CODE 250: Performed by: STUDENT IN AN ORGANIZED HEALTH CARE EDUCATION/TRAINING PROGRAM

## 2019-06-09 PROCEDURE — 63600175 PHARM REV CODE 636 W HCPCS: Performed by: NURSE PRACTITIONER

## 2019-06-09 PROCEDURE — 84100 ASSAY OF PHOSPHORUS: CPT

## 2019-06-09 PROCEDURE — 83735 ASSAY OF MAGNESIUM: CPT

## 2019-06-09 PROCEDURE — 27000221 HC OXYGEN, UP TO 24 HOURS

## 2019-06-09 PROCEDURE — 99292 CRITICAL CARE ADDL 30 MIN: CPT | Mod: ,,, | Performed by: INTERNAL MEDICINE

## 2019-06-09 PROCEDURE — 63600175 PHARM REV CODE 636 W HCPCS: Performed by: GENERAL ACUTE CARE HOSPITAL

## 2019-06-09 PROCEDURE — 99291 CRITICAL CARE FIRST HOUR: CPT | Mod: ,,, | Performed by: GENERAL ACUTE CARE HOSPITAL

## 2019-06-09 PROCEDURE — A4216 STERILE WATER/SALINE, 10 ML: HCPCS | Performed by: NURSE PRACTITIONER

## 2019-06-09 PROCEDURE — 25000003 PHARM REV CODE 250: Performed by: INTERNAL MEDICINE

## 2019-06-09 PROCEDURE — 36415 COLL VENOUS BLD VENIPUNCTURE: CPT

## 2019-06-09 PROCEDURE — 99900035 HC TECH TIME PER 15 MIN (STAT)

## 2019-06-09 PROCEDURE — 94761 N-INVAS EAR/PLS OXIMETRY MLT: CPT

## 2019-06-09 PROCEDURE — 99233 SBSQ HOSP IP/OBS HIGH 50: CPT | Mod: GC,,, | Performed by: INTERNAL MEDICINE

## 2019-06-09 PROCEDURE — 99233 PR SUBSEQUENT HOSPITAL CARE,LEVL III: ICD-10-PCS | Mod: GC,,, | Performed by: INTERNAL MEDICINE

## 2019-06-09 PROCEDURE — 63600175 PHARM REV CODE 636 W HCPCS: Performed by: STUDENT IN AN ORGANIZED HEALTH CARE EDUCATION/TRAINING PROGRAM

## 2019-06-09 PROCEDURE — 94003 VENT MGMT INPAT SUBQ DAY: CPT

## 2019-06-09 PROCEDURE — 63600175 PHARM REV CODE 636 W HCPCS: Performed by: PHYSICIAN ASSISTANT

## 2019-06-09 PROCEDURE — 20000000 HC ICU ROOM

## 2019-06-09 PROCEDURE — 94640 AIRWAY INHALATION TREATMENT: CPT

## 2019-06-09 PROCEDURE — 99900026 HC AIRWAY MAINTENANCE (STAT)

## 2019-06-09 PROCEDURE — 25000003 PHARM REV CODE 250: Performed by: PHYSICIAN ASSISTANT

## 2019-06-09 PROCEDURE — 25000003 PHARM REV CODE 250: Performed by: GENERAL ACUTE CARE HOSPITAL

## 2019-06-09 PROCEDURE — 25000242 PHARM REV CODE 250 ALT 637 W/ HCPCS: Performed by: NURSE PRACTITIONER

## 2019-06-09 PROCEDURE — 80053 COMPREHEN METABOLIC PANEL: CPT

## 2019-06-09 PROCEDURE — 25000003 PHARM REV CODE 250: Performed by: NURSE PRACTITIONER

## 2019-06-09 RX ORDER — MEROPENEM AND SODIUM CHLORIDE 1 G/50ML
1 INJECTION, SOLUTION INTRAVENOUS
Status: DISCONTINUED | OUTPATIENT
Start: 2019-06-09 | End: 2019-06-10

## 2019-06-09 RX ADMIN — SODIUM CHLORIDE 300 MG: 9 INJECTION, SOLUTION INTRAVENOUS at 08:06

## 2019-06-09 RX ADMIN — POTASSIUM CHLORIDE 40 MEQ: 20 SOLUTION ORAL at 07:06

## 2019-06-09 RX ADMIN — CHLORHEXIDINE GLUCONATE 0.12% ORAL RINSE 15 ML: 1.2 LIQUID ORAL at 09:06

## 2019-06-09 RX ADMIN — FUROSEMIDE 60 MG: 10 INJECTION, SOLUTION INTRAVENOUS at 02:06

## 2019-06-09 RX ADMIN — FENTANYL CITRATE 50 MCG: 50 INJECTION INTRAMUSCULAR; INTRAVENOUS at 12:06

## 2019-06-09 RX ADMIN — FENTANYL CITRATE 50 MCG: 50 INJECTION INTRAMUSCULAR; INTRAVENOUS at 07:06

## 2019-06-09 RX ADMIN — FUROSEMIDE 60 MG: 10 INJECTION, SOLUTION INTRAVENOUS at 09:06

## 2019-06-09 RX ADMIN — FUROSEMIDE 60 MG: 10 INJECTION, SOLUTION INTRAVENOUS at 08:06

## 2019-06-09 RX ADMIN — Medication 3 ML: at 02:06

## 2019-06-09 RX ADMIN — ATORVASTATIN CALCIUM 80 MG: 20 TABLET, FILM COATED ORAL at 08:06

## 2019-06-09 RX ADMIN — FAMOTIDINE 20 MG: 20 TABLET, FILM COATED ORAL at 08:06

## 2019-06-09 RX ADMIN — HEPARIN SODIUM 5000 UNITS: 5000 INJECTION, SOLUTION INTRAVENOUS; SUBCUTANEOUS at 10:06

## 2019-06-09 RX ADMIN — FENTANYL CITRATE 50 MCG: 50 INJECTION INTRAMUSCULAR; INTRAVENOUS at 11:06

## 2019-06-09 RX ADMIN — SODIUM CHLORIDE 300 MG: 9 INJECTION, SOLUTION INTRAVENOUS at 09:06

## 2019-06-09 RX ADMIN — IPRATROPIUM BROMIDE AND ALBUTEROL SULFATE 3 ML: .5; 3 SOLUTION RESPIRATORY (INHALATION) at 08:06

## 2019-06-09 RX ADMIN — MEROPENEM AND SODIUM CHLORIDE 1 G: 1 INJECTION, SOLUTION INTRAVENOUS at 05:06

## 2019-06-09 RX ADMIN — MORPHINE SULFATE 2 MG: 2 INJECTION, SOLUTION INTRAMUSCULAR; INTRAVENOUS at 02:06

## 2019-06-09 RX ADMIN — ACETAMINOPHEN 650 MG: 650 SOLUTION ORAL at 06:06

## 2019-06-09 RX ADMIN — HEPARIN SODIUM 5000 UNITS: 5000 INJECTION, SOLUTION INTRAVENOUS; SUBCUTANEOUS at 02:06

## 2019-06-09 RX ADMIN — Medication 2 MG: at 08:06

## 2019-06-09 RX ADMIN — INSULIN ASPART 2 UNITS: 100 INJECTION, SOLUTION INTRAVENOUS; SUBCUTANEOUS at 06:06

## 2019-06-09 RX ADMIN — HEPARIN SODIUM 5000 UNITS: 5000 INJECTION, SOLUTION INTRAVENOUS; SUBCUTANEOUS at 06:06

## 2019-06-09 RX ADMIN — ASPIRIN 81 MG CHEWABLE TABLET 81 MG: 81 TABLET CHEWABLE at 08:06

## 2019-06-09 RX ADMIN — PIPERACILLIN AND TAZOBACTAM 4.5 G: 4; .5 INJECTION, POWDER, LYOPHILIZED, FOR SOLUTION INTRAVENOUS; PARENTERAL at 03:06

## 2019-06-09 RX ADMIN — FAMOTIDINE 20 MG: 20 TABLET, FILM COATED ORAL at 09:06

## 2019-06-09 RX ADMIN — CHLORHEXIDINE GLUCONATE 0.12% ORAL RINSE 15 ML: 1.2 LIQUID ORAL at 08:06

## 2019-06-09 RX ADMIN — MAGNESIUM SULFATE IN WATER 2 G: 40 INJECTION, SOLUTION INTRAVENOUS at 07:06

## 2019-06-09 RX ADMIN — Medication 12.5 MG: at 08:06

## 2019-06-09 RX ADMIN — Medication 2 MG: at 07:06

## 2019-06-09 RX ADMIN — PIPERACILLIN AND TAZOBACTAM 4.5 G: 4; .5 INJECTION, POWDER, LYOPHILIZED, FOR SOLUTION INTRAVENOUS; PARENTERAL at 11:06

## 2019-06-09 RX ADMIN — Medication 25 MG: at 09:06

## 2019-06-09 RX ADMIN — ONDANSETRON 4 MG: 2 INJECTION INTRAMUSCULAR; INTRAVENOUS at 07:06

## 2019-06-09 NOTE — PLAN OF CARE
"Problem: Adult Inpatient Plan of Care  Goal: Plan of Care Review  Outcome: Ongoing (interventions implemented as appropriate)  Dx: Acute on chronic respiratory failure with hypoxia    Shift Events: ot placed on trach collar .    Neuro: Follows Commands and Moves All Extremities. Mouths words correctly    Vital Signs: BP (!) 156/80   Pulse 97   Temp 100 °F (37.8 °C) (Oral)   Resp (!) 34   Ht 5' 5" (1.651 m)   Wt 69.9 kg (154 lb)   LMP  (LMP Unknown)   SpO2 100%   Breastfeeding? No   BMI 25.63 kg/m²     Diet: Tube Feeds    Urine Output: Urinary Catheter 125-200 cc/hour    Drains: nephrostomy tube 350cc / shift     Labs/Accuchecks:accuchecks q6.    Skin: dermatits on buttocks. No new skin break down noticed. Will continue to monitor           "

## 2019-06-09 NOTE — PROGRESS NOTES
Therapy with vancomycin was discontinued by the provider.  Pharmacy will sign off, please re-consult as needed.    Marilou Jones, PharmD, BCCCP  [id number]

## 2019-06-09 NOTE — ASSESSMENT & PLAN NOTE
Ms. Huff is a 58 yo F with PMHx significant for CAD s/p PCI to LAD (2013, 2014) with known LCx 95% disease not revascularized due to issues in past, HTN, DLD, DM2, and recent prolonged hospitalization for sepsis 2/2 bacteremia from intra-abdominal abscess s/p I&D which was c/b prolonged resp failure s/p trach/PEG, GI bleed 2/2 rectal ulcer, and BL UE / RLE DVT s/p IVC filter who is re-admitted on 6/6 with acute hypoxemic respiratory failure. Found to have elevated Troponin (6) in the setting of profound acute blood loss anemia (Hbg 6), s/p 2u pRBC due to rectal bleeding.     - Patient is asymptomatic and denies anginal complaints / index symptoms.    - Tamx 102.3F, Hbg down 7, no further transfusions last night     Suspect elevated troponin is related to demand ischemia due to hypoxemia and persistent tachycardia in setting of known obstructive coronary disease rather than from acute plaque rupture. Do not suspect this is ACS.      Recs:  -- Unfortunately with her active blood loss anemia and sepsis, she is not a candidate for aggressive intervention at this time,   -- Would not treat this as ACS. Furthermore due to hemoglobin drop overnight requiring PRBC transfusion and recent GI bleeding do not recommend starting heparin gtt.  -- Continue ASA 81 mg daily (if deemed safe from GI perspective) and high intensity statin therapy due to known coronary artery disease.   -- Recommend starting Lopressor 12.5 mg BID as BP allows, titrated up to goal HR 55-60s with hx underlying CAD.  -- Follow up results of 2D echocardiogram.   -- Outpatient cardiology follow up   -- Maintain on telemetry and repeat EKG in the AM to look for any subtle changes  -- Up to date risk stratification labs to be ordered and addressed appropriately     --> TSH, Lipids, HbA1c

## 2019-06-09 NOTE — PLAN OF CARE
Problem: Adult Inpatient Plan of Care  Goal: Plan of Care Review  VSS currently. Tmax 102.3, liquid tylenol given. Pt tolerated trach collar for ~3 hours today. When pt was laid flat to clean up after BM, pts desatted to 80s and HR increased; MD made aware and verbal order to place pt back on vent received. Current vent settings A/C 40% FiO2 and PEEP 5, sats 100%. 2 liquid BMs. U/O adequate from correa and nephrostomy. Scheduled lasix given and pt responded well. No complaints of nausea or pain today, however, frequent trach suctioning required. TF started today with goal of 45cc/hr; current rate 20cc/hr. Accuchecks q6h. Pt remains A&Ox4. POC reviewed with pt and spouse, all questions answered. WCTM.

## 2019-06-09 NOTE — PROGRESS NOTES
[facility]  Cardiology  Progress Note    Patient Name: Mariann Huff  MRN: [id number]  Admission Date: 6/6/2019  Hospital Length of Stay: 3 days  Code Status: Full Code   Attending Physician: Crystal Mejía MD   Primary Care Physician: Jasbir Haney MD  Expected Discharge Date:   Principal Problem:Acute on chronic respiratory failure with hypoxia    Subjective:     Hospital Course:   No notes on file    Interim History:   No new complaints, denies bleeding evetns over night. No further transfusions, down trending Hbg 7.     Discussed at length with spouse and patient, regarding conservative medical management being she has rectal bleeding and unstable Hbg, invasive intervention consist of anti-platelets and systemic anticoagulation which will worsening her bleeding, they agree with plan. Patient denies any chest pain overnight, HD stable.     Troponins have ranged between 3.1 -> 2.6 -> 3.3 last drawns      Review of Systems   Unable to perform ROS: other (difficult to obtain due to trach status)   Constitution: Positive for fever.   Cardiovascular: Negative for chest pain, irregular heartbeat and near-syncope.   Respiratory: Negative for shortness of breath (improved).      Objective:     Vital Signs (Most Recent):  Temp: 99.9 °F (37.7 °C) (06/09/19 0700)  Pulse: 74 (06/09/19 0915)  Resp: (!) 8 (06/09/19 0915)  BP: (!) 111/58 (06/09/19 0902)  SpO2: 100 % (06/09/19 0915) Vital Signs (24h Range):  Temp:  [98.6 °F (37 °C)-102.3 °F (39.1 °C)] 99.9 °F (37.7 °C)  Pulse:  [] 74  Resp:  [5-42] 8  SpO2:  [91 %-100 %] 100 %  BP: ()/(54-78) 111/58     Weight: 69.9 kg (154 lb)  Body mass index is 25.63 kg/m².    SpO2: 100 %  O2 Device (Oxygen Therapy): ventilator      Intake/Output Summary (Last 24 hours) at 6/9/2019 0918  Last data filed at 6/9/2019 0900  Gross per 24 hour   Intake 1277 ml   Output 3220 ml   Net -1943 ml       Lines/Drains/Airways     Peripherally Inserted Central Catheter  Line                 PICC Double Lumen 05/24/19 0900 right brachial 16 days          Drain                 Nephrostomy 04/21/19 0629 Left 8 Fr. 49 days         Gastrostomy/Enterostomy 05/02/19 1134 Percutaneous endoscopic gastrostomy (PEG) LUQ decompression;feeding 37 days         Urethral Catheter 06/06/19 1835 16 Fr. 2 days          Airway                 Surgical Airway 06/03/19 1234 Shiley Cuffed 5 days                Physical Exam   Constitutional: She is cooperative. Ill appearance: chronic ill appearance.   HENT:   Trach status on vent   Neck: Normal range of motion. Neck supple.   Difficult to visualize JVD   Cardiovascular: Normal rate, regular rhythm and intact distal pulses.   Pulmonary/Chest: Effort normal. No respiratory distress.   Coarse breath sounds diffusely   Genitourinary:   Genitourinary Comments: L nephrostomy tube in place   Musculoskeletal: She exhibits no edema.   Neurological: She is alert.   Skin: Skin is warm and dry. No erythema.   Psychiatric: She has a normal mood and affect. Her behavior is normal.       Significant Labs: All pertinent lab results from the last 24 hours have been reviewed.    Significant Imaging: Reviewed in EPIC.    Assessment and Plan:     NSTEMI (non-ST elevated myocardial infarction)  Ms. Huff is a 58 yo F with PMHx significant for CAD s/p PCI to LAD (2013, 2014) with known LCx 95% disease not revascularized due to issues in past, HTN, DLD, DM2, and recent prolonged hospitalization for sepsis 2/2 bacteremia from intra-abdominal abscess s/p I&D which was c/b prolonged resp failure s/p trach/PEG, GI bleed 2/2 rectal ulcer, and BL UE / RLE DVT s/p IVC filter who is re-admitted on 6/6 with acute hypoxemic respiratory failure. Found to have elevated Troponin (6) in the setting of profound acute blood loss anemia (Hbg 6), s/p 2u pRBC due to rectal bleeding.     - Patient is asymptomatic and denies anginal complaints / index symptoms.    - Tamx 102.3F, Hbg down 7, no  further transfusions last night     Type II NSTEMI, elevated troponin is related to demand ischemia due to hypoxemia and persistent tachycardia in setting of known obstructive coronary disease rather than from acute plaque rupture. Do not suspect this is ACS.      Recs:  -- Unfortunately with her active blood loss anemia and sepsis, she is not a candidate for aggressive intervention at this time,   -- Would not treat this as ACS. Furthermore due to hemoglobin drop overnight requiring PRBC transfusion and recent GI bleeding do not recommend starting heparin gtt.  -- Continue ASA 81 mg daily (if deemed safe from GI perspective) and high intensity statin therapy due to known coronary artery disease.   -- Recommend starting Lopressor 12.5 mg BID as BP allows, titrated up to goal HR 55-60s with hx underlying CAD.  -- Follow up results of 2D echocardiogram.   -- Outpatient cardiology follow up   -- Outpatient ischemic work up   -- Maintain on telemetry and repeat EKG in the AM to look for any subtle changes  -- Up to date risk stratification labs to be ordered and addressed appropriately     --> TSH, Lipids, HbA1c                       VTE Risk Mitigation (From admission, onward)        Ordered     heparin (porcine) injection 5,000 Units  Every 8 hours      06/08/19 0757     IP VTE LOW RISK PATIENT  Once      06/08/19 0756     Place CRISTINA hose  Until discontinued      06/06/19 1354     Place sequential compression device  Until discontinued      06/06/19 1354          Leona Shaw MD  Cardiology  I have personally taken the history and examined the patient and agree with the resident's note as stated above.  Needs more transfusions.  [facility]

## 2019-06-09 NOTE — SUBJECTIVE & OBJECTIVE
Interim History:   No new complaints, denies bleeding evetns over night. No further transfusions, down trending Hbg 7.     Discussed at length with spouse and patient, regarding conservative medical management being she has rectal bleeding and unstable Hbg, invasive intervention consist of anti-platelets and systemic anticoagulation which will worsening her bleeding, they agree with plan. Patient denies any chest pain overnight, HD stable.     Troponins have ranged between 3.1 -> 2.6 -> 3.3 last drawns      Review of Systems   Unable to perform ROS: other (difficult to obtain due to trach status)   Constitution: Positive for fever.   Cardiovascular: Negative for chest pain, irregular heartbeat and near-syncope.   Respiratory: Negative for shortness of breath (improved).      Objective:     Vital Signs (Most Recent):  Temp: 99.9 °F (37.7 °C) (06/09/19 0700)  Pulse: 74 (06/09/19 0915)  Resp: (!) 8 (06/09/19 0915)  BP: (!) 111/58 (06/09/19 0902)  SpO2: 100 % (06/09/19 0915) Vital Signs (24h Range):  Temp:  [98.6 °F (37 °C)-102.3 °F (39.1 °C)] 99.9 °F (37.7 °C)  Pulse:  [] 74  Resp:  [5-42] 8  SpO2:  [91 %-100 %] 100 %  BP: ()/(54-78) 111/58     Weight: 69.9 kg (154 lb)  Body mass index is 25.63 kg/m².    SpO2: 100 %  O2 Device (Oxygen Therapy): ventilator      Intake/Output Summary (Last 24 hours) at 6/9/2019 0918  Last data filed at 6/9/2019 0900  Gross per 24 hour   Intake 1277 ml   Output 3220 ml   Net -1943 ml       Lines/Drains/Airways     Peripherally Inserted Central Catheter Line                 PICC Double Lumen 05/24/19 0900 right brachial 16 days          Drain                 Nephrostomy 04/21/19 0629 Left 8 Fr. 49 days         Gastrostomy/Enterostomy 05/02/19 1134 Percutaneous endoscopic gastrostomy (PEG) LUQ decompression;feeding 37 days         Urethral Catheter 06/06/19 1835 16 Fr. 2 days          Airway                 Surgical Airway 06/03/19 1234 Shiley Cuffed 5 days                 Physical Exam   Constitutional: She is cooperative. Ill appearance: chronic ill appearance.   HENT:   Trach status on vent   Neck: Normal range of motion. Neck supple.   Difficult to visualize JVD   Cardiovascular: Normal rate, regular rhythm and intact distal pulses.   Pulmonary/Chest: Effort normal. No respiratory distress.   Coarse breath sounds diffusely   Genitourinary:   Genitourinary Comments: L nephrostomy tube in place   Musculoskeletal: She exhibits no edema.   Neurological: She is alert.   Skin: Skin is warm and dry. No erythema.   Psychiatric: She has a normal mood and affect. Her behavior is normal.       Significant Labs: All pertinent lab results from the last 24 hours have been reviewed.    Significant Imaging: Reviewed in EPIC.

## 2019-06-09 NOTE — PROGRESS NOTES
[facility]  Critical Care Medicine  Progress Note    Patient Name: Mariann Huff  MRN: [id number]  Admission Date: 6/6/2019  Hospital Length of Stay: 3 days  Code Status: Full Code  Attending Provider: Crystal Mejía MD  Primary Care Provider: Jasbir Haney MD   Principal Problem: Acute on chronic respiratory failure with hypoxia    Subjective:     HPI:  Mariann Huff is a 58 y/o female with history of Asthma, HTN, HLD,  CAD (LAD GINGER proximal and distal 2013 and GINGER ostial LAD 2/2014), HFpEF, NSTEMI, T2DM, Obesity, Sleep Apnea, and back pain who presented to the ED on 6/6/19 with acute shortness of breath started last night.      Mrs. Huff has had two recent hospitalizations. 4/12/19-4/14/19 for L5-S1 OLIF with Neurosurgery with intraoperaative left ureteral injury with ureteroureteral anastomosis and ureteral stent placement. Second admission, 4/20/19-6/5/19 for intraabdominal abscess s/p washout and ligation of left ureter with nephrostomy tube placement on 4/21/19 and tracheostomy and PEG placement on 5/2/19.  Most recent hospitalization complicated by BUE and RLE DVT s/p IVC filter on 5/29/19 given concern for GIB with workup revealing rectal ulcer requiring pRBC transfusion.  She was followed by ID during admission and is being treated with long term antibiotic therapy for E coli and Staph lugdenesis bacteremia with IV cefazolin and rifampin with end dated 6/18/19 given hardware.  She also completed 7 day course of cefepime for HAP (pseudomonas) on 5/25/19 and candida glabrata UTI that she received 7 days of high dose fluconazole.   She was discharged to rehab.    In the ED, she was started empirically on vancomycin and cefepime for possible PNA, CTA chest ordered for possible PE.  Labs remarkable for BNP 1403, lactate wnl, wbc 13 K, and sCr 1.4 ( from 0.8).  She was given a duo neb and placed on PSV. She is being admitted to the ICU with critical care medicine for further workup and  evaluation.      Hospital/ICU Course:  Ms. Huff was admitted to the MICU. Her troponin has continued to rise so Cardiology was consulted. Cardiology did not recommend any acute intervention or treating as ACS. The morning of 06/07/19 her Hb had dropped below 7 so she was transfused 2 units of PRBCs in the setting of elevated troponin.     Interval History/Significant Events: No significant events overnight. Growing Pseudomonas from sputum.     Review of Systems   Constitutional: Negative for chills and diaphoresis.   HENT: Negative for postnasal drip.    Eyes: Negative for visual disturbance.   Respiratory: Negative for shortness of breath.    Cardiovascular: Negative for chest pain.   Gastrointestinal: Negative for abdominal pain and constipation.   Genitourinary: Negative for hematuria.   Musculoskeletal: Negative for myalgias.   Skin: Negative for rash.   Neurological: Negative for headaches.   Hematological: Negative for adenopathy.     Objective:     Vital Signs (Most Recent):  Temp: 99.9 °F (37.7 °C) (06/09/19 0700)  Pulse: 81 (06/09/19 0724)  Resp: 16 (06/09/19 0724)  BP: 108/61 (06/09/19 0700)  SpO2: 95 % (06/09/19 0724) Vital Signs (24h Range):  Temp:  [98.6 °F (37 °C)-102.3 °F (39.1 °C)] 99.9 °F (37.7 °C)  Pulse:  [] 81  Resp:  [5-42] 16  SpO2:  [91 %-100 %] 95 %  BP: ()/(54-78) 108/61   Weight: 69.9 kg (154 lb)  Body mass index is 25.63 kg/m².      Intake/Output Summary (Last 24 hours) at 6/9/2019 0812  Last data filed at 6/9/2019 0700  Gross per 24 hour   Intake 1292 ml   Output 3040 ml   Net -1748 ml       Physical Exam   HENT:   Head: Normocephalic and atraumatic.   Mouth/Throat: Oropharynx is clear and moist.   Eyes: Pupils are equal, round, and reactive to light. Conjunctivae are normal. Right eye exhibits no discharge. Left eye exhibits no discharge. No scleral icterus.   Neck: Trachea normal, normal range of motion and full passive range of motion without pain. Neck supple. No JVD  present. No tracheal deviation present. No thyromegaly present.   Cardiovascular: Normal rate, regular rhythm, S1 normal, S2 normal, normal heart sounds and intact distal pulses. Exam reveals no gallop and no friction rub.   No murmur heard.  Pulmonary/Chest: She is in respiratory distress. She has no wheezes. She has no rales. She exhibits no tenderness.   Abdominal: Soft. Bowel sounds are normal. She exhibits no distension and no mass. There is no tenderness. There is no rebound and no guarding.   Musculoskeletal: Normal range of motion. She exhibits no tenderness or deformity.   Lymphadenopathy:     She has no cervical adenopathy.   Neurological: No cranial nerve deficit. Coordination normal.   Skin: Skin is warm and dry. No abrasion and no bruising noted.   Psychiatric: She has a normal mood and affect.   Vitals reviewed.      Vents:  Vent Mode: A/C (06/09/19 0724)  Ventilator Initiated: Yes (06/06/19 1416)  Set Rate: 16 bmp (06/09/19 0724)  Vt Set: 400 mL (06/09/19 0724)  Pressure Support: 10 cmH20 (06/08/19 1300)  PEEP/CPAP: 5 cmH20 (06/09/19 0724)  Oxygen Concentration (%): 40 (06/09/19 0724)  Peak Airway Pressure: 30 cmH2O (06/09/19 0724)  Plateau Pressure: 0 cmH20 (06/09/19 0724)  Total Ve: 6 mL (06/09/19 0724)  F/VT Ratio<105 (RSBI): (!) 43.01 (06/09/19 0724)  Lines/Drains/Airways     Peripherally Inserted Central Catheter Line                 PICC Double Lumen 05/24/19 0900 right brachial 15 days          Drain                 Nephrostomy 04/21/19 0629 Left 8 Fr. 49 days         Gastrostomy/Enterostomy 05/02/19 1134 Percutaneous endoscopic gastrostomy (PEG) LUQ decompression;feeding 37 days         Urethral Catheter 06/06/19 1835 16 Fr. 2 days          Airway                 Surgical Airway 06/03/19 1234 Shiley Cuffed 5 days              Significant Labs:    CBC/Anemia Profile:  Recent Labs   Lab 06/08/19  0300 06/08/19  1337 06/09/19  0400   WBC 8.47 8.84 7.45   HGB 8.5* 9.6* 8.4*   HCT 27.6* 31.0* 28.0*     250 234   MCV 91 90 96   RDW 15.9* 16.2* 16.0*        Chemistries:  Recent Labs   Lab 06/07/19  2100 06/08/19  0300 06/09/19  0400   * 149* 149*   K 3.8 4.5 4.3    115* 112*   CO2 26 26 28   BUN 43* 43* 38*   CREATININE 1.3 1.2 1.1   CALCIUM 9.1 8.8 8.9   ALBUMIN  --  1.7* 1.7*   PROT  --  6.8 6.9   BILITOT  --  0.8 0.8   ALKPHOS  --  122 134   ALT  --  9* 10   AST  --  24 26   MG 1.5* 2.3 2.6   PHOS  --  2.8 3.2       Significant Imaging:  I have reviewed and interpreted all pertinent imaging results/findings within the past 24 hours.      ABG  Recent Labs   Lab 06/08/19  0929   PH 7.397   PO2 36*   PCO2 45.7*   HCO3 28.1*   BE 3     Assessment/Plan:     Pulmonary  * Acute on chronic respiratory failure with hypoxia  --Suspect volume overload in the setting of NSTEMI.  --Diurese. Monitor I/O.  --Had to be placed back on mechanical ventilation after ~2 hours on trach collar.   --Reattempt today.   --Wean ventilator as tolerated.     Pneumonia of both lungs due to Pseudomonas species  --On pip/tazo. Stop vancomycin.   --Present on admission.     Cardiac/Vascular  Elevated troponin  --Cardiology following.   --Unable to anticoagulate.   --On aspirin + lopressor.     (HFpEF) heart failure with preserved ejection fraction  --repeating TTE; concern for acute on chronic heart failure exacerbation    Essential hypertension  --holding antihypertensives (amlodipine and chlorthalidone).   --diuresing with lasix.       NSTEMI (non-ST elevated myocardial infarction)  --Cardiology following.   --On aspirin. Holding on anticoagulation due to recent bleeding from rectal ulcer.       CAD (coronary artery disease)  --rising troponin with EKG concerning for lateral t wave changes in the setting of known LAD stents.  Denies chest pain  --ASA  --consult cardiology  --trend troponin levels  --TTE    Oncology  Normocytic anemia  --suspect secondary to prolonged hospitalization with critical illness and recent rectal  bleeding.  --hb below 7 this morning. Transfused 2 units of PRBCs.   --No current signs of bleeding.     Endocrine  Type 2 diabetes mellitus, with long-term current use of insulin  --A1c 5.4.    --frequent glucose checks with SSI      Critical Care Time: 60 minutes  Critical secondary to Patient has a condition that poses threat to life and bodily function: Severe Respiratory Distress      Critical care was time spent personally by me on the following activities: development of treatment plan with patient or surrogate and bedside caregivers, discussions with consultants, evaluation of patient's response to treatment, examination of patient, ordering and performing treatments and interventions, ordering and review of laboratory studies, ordering and review of radiographic studies, pulse oximetry, re-evaluation of patient's condition. This critical care time did not overlap with that of any other provider or involve time for any procedures.     Gabino Brown PA-C  Critical Care Medicine  [facility]

## 2019-06-09 NOTE — PROGRESS NOTES
Pt placed on trach collar 10L 60% and required deep suctioning. No complications, will continue to monitor.

## 2019-06-09 NOTE — SUBJECTIVE & OBJECTIVE
Interval History/Significant Events: No significant events overnight. Growing Pseudomonas from sputum.     Review of Systems   Constitutional: Negative for chills and diaphoresis.   HENT: Negative for postnasal drip.    Eyes: Negative for visual disturbance.   Respiratory: Negative for shortness of breath.    Cardiovascular: Negative for chest pain.   Gastrointestinal: Negative for abdominal pain and constipation.   Genitourinary: Negative for hematuria.   Musculoskeletal: Negative for myalgias.   Skin: Negative for rash.   Neurological: Negative for headaches.   Hematological: Negative for adenopathy.     Objective:     Vital Signs (Most Recent):  Temp: 99.9 °F (37.7 °C) (06/09/19 0700)  Pulse: 81 (06/09/19 0724)  Resp: 16 (06/09/19 0724)  BP: 108/61 (06/09/19 0700)  SpO2: 95 % (06/09/19 0724) Vital Signs (24h Range):  Temp:  [98.6 °F (37 °C)-102.3 °F (39.1 °C)] 99.9 °F (37.7 °C)  Pulse:  [] 81  Resp:  [5-42] 16  SpO2:  [91 %-100 %] 95 %  BP: ()/(54-78) 108/61   Weight: 69.9 kg (154 lb)  Body mass index is 25.63 kg/m².      Intake/Output Summary (Last 24 hours) at 6/9/2019 0812  Last data filed at 6/9/2019 0700  Gross per 24 hour   Intake 1292 ml   Output 3040 ml   Net -1748 ml       Physical Exam   HENT:   Head: Normocephalic and atraumatic.   Mouth/Throat: Oropharynx is clear and moist.   Eyes: Pupils are equal, round, and reactive to light. Conjunctivae are normal. Right eye exhibits no discharge. Left eye exhibits no discharge. No scleral icterus.   Neck: Trachea normal, normal range of motion and full passive range of motion without pain. Neck supple. No JVD present. No tracheal deviation present. No thyromegaly present.   Cardiovascular: Normal rate, regular rhythm, S1 normal, S2 normal, normal heart sounds and intact distal pulses. Exam reveals no gallop and no friction rub.   No murmur heard.  Pulmonary/Chest: She is in respiratory distress. She has no wheezes. She has no rales. She exhibits no  tenderness.   Abdominal: Soft. Bowel sounds are normal. She exhibits no distension and no mass. There is no tenderness. There is no rebound and no guarding.   Musculoskeletal: Normal range of motion. She exhibits no tenderness or deformity.   Lymphadenopathy:     She has no cervical adenopathy.   Neurological: No cranial nerve deficit. Coordination normal.   Skin: Skin is warm and dry. No abrasion and no bruising noted.   Psychiatric: She has a normal mood and affect.   Vitals reviewed.      Vents:  Vent Mode: A/C (06/09/19 0724)  Ventilator Initiated: Yes (06/06/19 1416)  Set Rate: 16 bmp (06/09/19 0724)  Vt Set: 400 mL (06/09/19 0724)  Pressure Support: 10 cmH20 (06/08/19 1300)  PEEP/CPAP: 5 cmH20 (06/09/19 0724)  Oxygen Concentration (%): 40 (06/09/19 0724)  Peak Airway Pressure: 30 cmH2O (06/09/19 0724)  Plateau Pressure: 0 cmH20 (06/09/19 0724)  Total Ve: 6 mL (06/09/19 0724)  F/VT Ratio<105 (RSBI): (!) 43.01 (06/09/19 0724)  Lines/Drains/Airways     Peripherally Inserted Central Catheter Line                 PICC Double Lumen 05/24/19 0900 right brachial 15 days          Drain                 Nephrostomy 04/21/19 0629 Left 8 Fr. 49 days         Gastrostomy/Enterostomy 05/02/19 1134 Percutaneous endoscopic gastrostomy (PEG) LUQ decompression;feeding 37 days         Urethral Catheter 06/06/19 1835 16 Fr. 2 days          Airway                 Surgical Airway 06/03/19 1234 Shiley Cuffed 5 days              Significant Labs:    CBC/Anemia Profile:  Recent Labs   Lab 06/08/19  0300 06/08/19  1337 06/09/19  0400   WBC 8.47 8.84 7.45   HGB 8.5* 9.6* 8.4*   HCT 27.6* 31.0* 28.0*    250 234   MCV 91 90 96   RDW 15.9* 16.2* 16.0*        Chemistries:  Recent Labs   Lab 06/07/19  2100 06/08/19  0300 06/09/19  0400   * 149* 149*   K 3.8 4.5 4.3    115* 112*   CO2 26 26 28   BUN 43* 43* 38*   CREATININE 1.3 1.2 1.1   CALCIUM 9.1 8.8 8.9   ALBUMIN  --  1.7* 1.7*   PROT  --  6.8 6.9   BILITOT  --  0.8 0.8    ALKPHOS  --  122 134   ALT  --  9* 10   AST  --  24 26   MG 1.5* 2.3 2.6   PHOS  --  2.8 3.2       Significant Imaging:  I have reviewed and interpreted all pertinent imaging results/findings within the past 24 hours.

## 2019-06-10 PROBLEM — R53.81 DEBILITY: Status: ACTIVE | Noted: 2019-06-10

## 2019-06-10 LAB
ALBUMIN SERPL BCP-MCNC: 1.8 G/DL (ref 3.5–5.2)
ALP SERPL-CCNC: 134 U/L (ref 55–135)
ALT SERPL W/O P-5'-P-CCNC: 10 U/L (ref 10–44)
ANION GAP SERPL CALC-SCNC: 10 MMOL/L (ref 8–16)
AST SERPL-CCNC: 24 U/L (ref 10–40)
BASOPHILS # BLD AUTO: 0.07 K/UL (ref 0–0.2)
BASOPHILS NFR BLD: 0.8 % (ref 0–1.9)
BILIRUB SERPL-MCNC: 0.5 MG/DL (ref 0.1–1)
BUN SERPL-MCNC: 39 MG/DL (ref 6–20)
CALCIUM SERPL-MCNC: 8.9 MG/DL (ref 8.7–10.5)
CHLORIDE SERPL-SCNC: 111 MMOL/L (ref 95–110)
CO2 SERPL-SCNC: 28 MMOL/L (ref 23–29)
CREAT SERPL-MCNC: 1.3 MG/DL (ref 0.5–1.4)
DIFFERENTIAL METHOD: ABNORMAL
EOSINOPHIL # BLD AUTO: 0.6 K/UL (ref 0–0.5)
EOSINOPHIL NFR BLD: 6.5 % (ref 0–8)
ERYTHROCYTE [DISTWIDTH] IN BLOOD BY AUTOMATED COUNT: 15.8 % (ref 11.5–14.5)
EST. GFR  (AFRICAN AMERICAN): 52.3 ML/MIN/1.73 M^2
EST. GFR  (NON AFRICAN AMERICAN): 45.3 ML/MIN/1.73 M^2
GLUCOSE SERPL-MCNC: 164 MG/DL (ref 70–110)
HCT VFR BLD AUTO: 29.3 % (ref 37–48.5)
HGB BLD-MCNC: 8.7 G/DL (ref 12–16)
IMM GRANULOCYTES # BLD AUTO: 0.04 K/UL (ref 0–0.04)
IMM GRANULOCYTES NFR BLD AUTO: 0.5 % (ref 0–0.5)
LYMPHOCYTES # BLD AUTO: 1.7 K/UL (ref 1–4.8)
LYMPHOCYTES NFR BLD: 19.4 % (ref 18–48)
MAGNESIUM SERPL-MCNC: 2.9 MG/DL (ref 1.6–2.6)
MCH RBC QN AUTO: 27.4 PG (ref 27–31)
MCHC RBC AUTO-ENTMCNC: 29.7 G/DL (ref 32–36)
MCV RBC AUTO: 92 FL (ref 82–98)
MONOCYTES # BLD AUTO: 0.9 K/UL (ref 0.3–1)
MONOCYTES NFR BLD: 10.1 % (ref 4–15)
NEUTROPHILS # BLD AUTO: 5.4 K/UL (ref 1.8–7.7)
NEUTROPHILS NFR BLD: 62.7 % (ref 38–73)
NRBC BLD-RTO: 0 /100 WBC
PHOSPHATE SERPL-MCNC: 3.2 MG/DL (ref 2.7–4.5)
PLATELET # BLD AUTO: 263 K/UL (ref 150–350)
PMV BLD AUTO: 11 FL (ref 9.2–12.9)
POCT GLUCOSE: 139 MG/DL (ref 70–110)
POCT GLUCOSE: 144 MG/DL (ref 70–110)
POCT GLUCOSE: 163 MG/DL (ref 70–110)
POTASSIUM SERPL-SCNC: 4.2 MMOL/L (ref 3.5–5.1)
PROT SERPL-MCNC: 7.3 G/DL (ref 6–8.4)
RBC # BLD AUTO: 3.17 M/UL (ref 4–5.4)
SODIUM SERPL-SCNC: 149 MMOL/L (ref 136–145)
WBC # BLD AUTO: 8.64 K/UL (ref 3.9–12.7)

## 2019-06-10 PROCEDURE — 99900035 HC TECH TIME PER 15 MIN (STAT)

## 2019-06-10 PROCEDURE — 99233 PR SUBSEQUENT HOSPITAL CARE,LEVL III: ICD-10-PCS | Mod: GC,,, | Performed by: INTERNAL MEDICINE

## 2019-06-10 PROCEDURE — 63600175 PHARM REV CODE 636 W HCPCS: Performed by: GENERAL ACUTE CARE HOSPITAL

## 2019-06-10 PROCEDURE — 99291 PR CRITICAL CARE, E/M 30-74 MINUTES: ICD-10-PCS | Mod: ,,, | Performed by: NURSE PRACTITIONER

## 2019-06-10 PROCEDURE — 25000003 PHARM REV CODE 250: Performed by: INTERNAL MEDICINE

## 2019-06-10 PROCEDURE — 63600175 PHARM REV CODE 636 W HCPCS: Performed by: STUDENT IN AN ORGANIZED HEALTH CARE EDUCATION/TRAINING PROGRAM

## 2019-06-10 PROCEDURE — 25000003 PHARM REV CODE 250: Performed by: GENERAL ACUTE CARE HOSPITAL

## 2019-06-10 PROCEDURE — 83735 ASSAY OF MAGNESIUM: CPT

## 2019-06-10 PROCEDURE — 80053 COMPREHEN METABOLIC PANEL: CPT

## 2019-06-10 PROCEDURE — 99291 CRITICAL CARE FIRST HOUR: CPT | Mod: ,,, | Performed by: NURSE PRACTITIONER

## 2019-06-10 PROCEDURE — 20000000 HC ICU ROOM

## 2019-06-10 PROCEDURE — A4216 STERILE WATER/SALINE, 10 ML: HCPCS | Performed by: NURSE PRACTITIONER

## 2019-06-10 PROCEDURE — 99233 PR SUBSEQUENT HOSPITAL CARE,LEVL III: ICD-10-PCS | Mod: ,,, | Performed by: PHYSICIAN ASSISTANT

## 2019-06-10 PROCEDURE — 97165 OT EVAL LOW COMPLEX 30 MIN: CPT

## 2019-06-10 PROCEDURE — 25000003 PHARM REV CODE 250: Performed by: STUDENT IN AN ORGANIZED HEALTH CARE EDUCATION/TRAINING PROGRAM

## 2019-06-10 PROCEDURE — 97162 PT EVAL MOD COMPLEX 30 MIN: CPT

## 2019-06-10 PROCEDURE — 25000003 PHARM REV CODE 250: Performed by: NURSE PRACTITIONER

## 2019-06-10 PROCEDURE — 97535 SELF CARE MNGMENT TRAINING: CPT

## 2019-06-10 PROCEDURE — 99900026 HC AIRWAY MAINTENANCE (STAT)

## 2019-06-10 PROCEDURE — 63600175 PHARM REV CODE 636 W HCPCS: Performed by: NURSE PRACTITIONER

## 2019-06-10 PROCEDURE — 97530 THERAPEUTIC ACTIVITIES: CPT

## 2019-06-10 PROCEDURE — 85025 COMPLETE CBC W/AUTO DIFF WBC: CPT

## 2019-06-10 PROCEDURE — 94761 N-INVAS EAR/PLS OXIMETRY MLT: CPT

## 2019-06-10 PROCEDURE — 99233 SBSQ HOSP IP/OBS HIGH 50: CPT | Mod: GC,,, | Performed by: INTERNAL MEDICINE

## 2019-06-10 PROCEDURE — 27000221 HC OXYGEN, UP TO 24 HOURS

## 2019-06-10 PROCEDURE — 94003 VENT MGMT INPAT SUBQ DAY: CPT

## 2019-06-10 PROCEDURE — 84100 ASSAY OF PHOSPHORUS: CPT

## 2019-06-10 PROCEDURE — 99233 SBSQ HOSP IP/OBS HIGH 50: CPT | Mod: ,,, | Performed by: PHYSICIAN ASSISTANT

## 2019-06-10 RX ORDER — MEROPENEM AND SODIUM CHLORIDE 1 G/50ML
1 INJECTION, SOLUTION INTRAVENOUS
Status: DISCONTINUED | OUTPATIENT
Start: 2019-06-10 | End: 2019-06-11

## 2019-06-10 RX ORDER — FAMOTIDINE 20 MG/1
20 TABLET, FILM COATED ORAL DAILY
Status: DISCONTINUED | OUTPATIENT
Start: 2019-06-10 | End: 2019-06-11

## 2019-06-10 RX ORDER — LOSARTAN POTASSIUM 25 MG/1
25 TABLET ORAL DAILY
Status: DISCONTINUED | OUTPATIENT
Start: 2019-06-10 | End: 2019-06-12 | Stop reason: HOSPADM

## 2019-06-10 RX ADMIN — MORPHINE SULFATE 2 MG: 2 INJECTION, SOLUTION INTRAMUSCULAR; INTRAVENOUS at 10:06

## 2019-06-10 RX ADMIN — HEPARIN SODIUM 5000 UNITS: 5000 INJECTION, SOLUTION INTRAVENOUS; SUBCUTANEOUS at 06:06

## 2019-06-10 RX ADMIN — MORPHINE SULFATE 2 MG: 2 INJECTION, SOLUTION INTRAMUSCULAR; INTRAVENOUS at 02:06

## 2019-06-10 RX ADMIN — SODIUM CHLORIDE 300 MG: 9 INJECTION, SOLUTION INTRAVENOUS at 08:06

## 2019-06-10 RX ADMIN — CHLORHEXIDINE GLUCONATE 0.12% ORAL RINSE 15 ML: 1.2 LIQUID ORAL at 08:06

## 2019-06-10 RX ADMIN — MEROPENEM AND SODIUM CHLORIDE 1 G: 1 INJECTION, SOLUTION INTRAVENOUS at 02:06

## 2019-06-10 RX ADMIN — MORPHINE SULFATE 2 MG: 2 INJECTION, SOLUTION INTRAMUSCULAR; INTRAVENOUS at 08:06

## 2019-06-10 RX ADMIN — Medication 3 ML: at 10:06

## 2019-06-10 RX ADMIN — INSULIN ASPART 2 UNITS: 100 INJECTION, SOLUTION INTRAVENOUS; SUBCUTANEOUS at 08:06

## 2019-06-10 RX ADMIN — SCOPALAMINE 1 PATCH: 1 PATCH, EXTENDED RELEASE TRANSDERMAL at 10:06

## 2019-06-10 RX ADMIN — FENTANYL CITRATE 50 MCG: 50 INJECTION INTRAMUSCULAR; INTRAVENOUS at 11:06

## 2019-06-10 RX ADMIN — HEPARIN SODIUM 5000 UNITS: 5000 INJECTION, SOLUTION INTRAVENOUS; SUBCUTANEOUS at 02:06

## 2019-06-10 RX ADMIN — Medication 25 MG: at 09:06

## 2019-06-10 RX ADMIN — LOSARTAN POTASSIUM 25 MG: 25 TABLET, FILM COATED ORAL at 02:06

## 2019-06-10 RX ADMIN — ATORVASTATIN CALCIUM 80 MG: 20 TABLET, FILM COATED ORAL at 09:06

## 2019-06-10 RX ADMIN — FUROSEMIDE 60 MG: 10 INJECTION, SOLUTION INTRAVENOUS at 02:06

## 2019-06-10 RX ADMIN — Medication 3 ML: at 02:06

## 2019-06-10 RX ADMIN — Medication 25 MG: at 08:06

## 2019-06-10 RX ADMIN — ASPIRIN 81 MG CHEWABLE TABLET 81 MG: 81 TABLET CHEWABLE at 09:06

## 2019-06-10 RX ADMIN — FUROSEMIDE 60 MG: 10 INJECTION, SOLUTION INTRAVENOUS at 09:06

## 2019-06-10 RX ADMIN — HEPARIN SODIUM 5000 UNITS: 5000 INJECTION, SOLUTION INTRAVENOUS; SUBCUTANEOUS at 10:06

## 2019-06-10 RX ADMIN — FAMOTIDINE 20 MG: 20 TABLET, FILM COATED ORAL at 09:06

## 2019-06-10 RX ADMIN — SODIUM CHLORIDE 300 MG: 9 INJECTION, SOLUTION INTRAVENOUS at 10:06

## 2019-06-10 RX ADMIN — CHLORHEXIDINE GLUCONATE 0.12% ORAL RINSE 15 ML: 1.2 LIQUID ORAL at 09:06

## 2019-06-10 RX ADMIN — ONDANSETRON 4 MG: 2 INJECTION INTRAMUSCULAR; INTRAVENOUS at 10:06

## 2019-06-10 RX ADMIN — FUROSEMIDE 60 MG: 10 INJECTION, SOLUTION INTRAVENOUS at 08:06

## 2019-06-10 RX ADMIN — MEROPENEM AND SODIUM CHLORIDE 1 G: 1 INJECTION, SOLUTION INTRAVENOUS at 01:06

## 2019-06-10 RX ADMIN — MORPHINE SULFATE 2 MG: 2 INJECTION, SOLUTION INTRAMUSCULAR; INTRAVENOUS at 05:06

## 2019-06-10 NOTE — ASSESSMENT & PLAN NOTE
--Cardiology following.   --On aspirin and lopressot  --Holding on anticoagulation due to recent bleeding from rectal ulcer.

## 2019-06-10 NOTE — PLAN OF CARE
"Problem: Adult Inpatient Plan of Care  Goal: Plan of Care Review  Outcome: Ongoing (interventions implemented as appropriate)  Dx: Acute on chronic respiratory failure with hypoxia    Shift Events:. OOB to chair for 7 hours, tolerated trach collar all day. Episode of 6 neat run VTACH, see note for more information.     Neuro: AAO x4, Follows Commands and Moves All Extremities    Vital Signs: BP (!) 156/78 (BP Location: Left arm, Patient Position: Lying)   Pulse 77   Temp 98.7 °F (37.1 °C) (Oral)   Resp (!) 22   Ht 5' 5" (1.651 m)   Wt 69.9 kg (154 lb)   LMP  (LMP Unknown)   SpO2 95%   Breastfeeding? No   BMI 25.63 kg/m²     Diet: NPO    Urine Output: Urinary Catheter  cc/hr 1445 cc/ shift    Drains: nephrostomy,  cc/shift     Labs/Accuchecks: daily labs, q 6 accuchecks.    Skin: no new breakdown noted. Wound care performed per orders. Pressure points protected. Heels floated. Weight shift assistance provided q 2 to prevent breakdown.    All questions answered concerns addressed. PoC reviewed with pt and . Emotional support and positive reinforcement provided. VSS. See flowsheet for full assessment. Will continue to monitor.          "

## 2019-06-10 NOTE — ASSESSMENT & PLAN NOTE
--Suspect volume overload in the setting of NSTEMI with concomitant pseudomonas pna.  --continue lasix  --continue meropenem  --Continue trach collar trials daily.   --place on vent at night

## 2019-06-10 NOTE — HPI
HPI per initial note:   Mariann Huff is a 58 y/o female with history of Asthma, HTN, HLD,  CAD (LAD GINGER proximal and distal 2013 and GINGER ostial LAD 2/2014), HFpEF, NSTEMI, T2DM, Obesity, Sleep Apnea, and back pain who presented to the ED on 6/6/19 with acute shortness of breath that started last night.       Mrs. Huff has had two recent hospitalizations. 4/12/19-4/14/19 for L5-S1 OLIF with Neurosurgery with intraoperaative left ureteral injury with ureteroureteral anastomosis and ureteral stent placement. Second admission, 4/20/19-6/5/19 for intraabdominal abscess s/p washout and ligation of left ureter with nephrostomy tube placement on 4/21/19 and tracheostomy and PEG placement on 5/2/19.  Most recent hospitalization complicated by BUE and RLE DVT s/p IVC filter on 5/29/19 given concern rectal bleeding requiring pRBC transfusion with workup revealing rectal ulcer.  She was followed by ID during admission and is being treated with long term antibiotic therapy for E coli and Staph lugdenesis bacteremia with IV cefazolin and rifampin with end dated 6/18/19 given hardware.  She also completed 7 day course of cefepime for HAP (pseudomonas) on 5/25/19 and candida glabrata UTI that she received 7 days of high dose fluconazole.   She was discharged to rehab on 6/5/19.     In the ED, she was started empirically on vancomycin and cefepime for possible PNA, CTA chest ordered for possible PE.  Labs remarkable for BNP 1403, lactate wnl, wbc 13 K, and sCr 1.4 ( from 0.8).  She was given a duo neb and placed on PSV. She was admitted to the ICU with critical care medicine for further workup and evaluation.      In this setting, palliative medicine was consulted to help with medical decision making and aid in the formation of goals of care.

## 2019-06-10 NOTE — CONSULTS
[facility]  Infectious Disease  Consult Note    Patient Name: Mariann Huff  MRN: [id number]  Admission Date: 6/6/2019  Hospital Length of Stay: 3 days  Attending Physician: Crystal Mejía MD  Primary Care Provider: Jasbir Haney MD     Isolation Status: No active isolations      Inpatient consult to Infectious Diseases  Consult performed by: Kristen Baptiste MD  Consult ordered by: Gabino Brown PA-C  Reason for consult: broad spectrum abx        Assessment/Plan:     Currently being followed by ID; note to follow up in AM.    Kristen Baptiste MD  Infectious Disease  [facility]

## 2019-06-10 NOTE — PLAN OF CARE
Problem: Physical Therapy Goal  Goal: Physical Therapy Goal  Goals to be met by: 19    Patient will increase functional independence with mobility by performin. Supine to sit with Contact Guard Assistance - not met  2. Sit to stand transfer with Contact Guard Assistance with RW -not met  3. Gait  x 100 feet with Contact Guard Assistance using Rolling Walker. -not met  4. Lower extremity exercise program x15 reps with supervision -not met    Evaluation completed and goals appropriate. Cathy Taylor, PT 6/10/2019

## 2019-06-10 NOTE — SUBJECTIVE & OBJECTIVE
Interim History: No acute issues overnight. Patient offers no new complaints, reports slight interval improvement in breathing.     Hemoglobin remains stable.     Review of Systems   Unable to perform ROS: other (difficult to obtain due to trach status)   Constitution: Negative for fever.   Cardiovascular: Negative for chest pain, irregular heartbeat and near-syncope.   Respiratory: Positive for shortness of breath (improved).      Objective:     Vital Signs (Most Recent):  Temp: 98.8 °F (37.1 °C) (06/10/19 0715)  Pulse: 78 (06/10/19 1100)  Resp: (!) 24 (06/10/19 1100)  BP: 132/66 (06/10/19 1100)  SpO2: 95 % (06/10/19 1100) Vital Signs (24h Range):  Temp:  [98.4 °F (36.9 °C)-101.1 °F (38.4 °C)] 98.8 °F (37.1 °C)  Pulse:  [] 78  Resp:  [16-40] 24  SpO2:  [95 %-100 %] 95 %  BP: ()/(53-89) 132/66     Weight: 69.9 kg (154 lb)  Body mass index is 25.63 kg/m².    SpO2: 95 %  O2 Device (Oxygen Therapy): Trach Collar      Intake/Output Summary (Last 24 hours) at 6/10/2019 1148  Last data filed at 6/10/2019 1100  Gross per 24 hour   Intake 1853 ml   Output 3100 ml   Net -1247 ml       Lines/Drains/Airways     Peripherally Inserted Central Catheter Line                 PICC Double Lumen 05/24/19 0900 right brachial 17 days          Drain                 Nephrostomy 04/21/19 0629 Left 8 Fr. 50 days         Gastrostomy/Enterostomy 05/02/19 1134 Percutaneous endoscopic gastrostomy (PEG) LUQ decompression;feeding 39 days         Urethral Catheter 06/06/19 1835 16 Fr. 3 days          Airway                 Surgical Airway 06/03/19 1234 Shiley Cuffed 6 days                Physical Exam   Constitutional: She is cooperative. Ill appearance: chronic ill appearance.   HENT:   Trach status on vent   Neck: Normal range of motion. Neck supple.   Difficult to visualize JVD   Cardiovascular: Normal rate, regular rhythm and intact distal pulses.   Pulmonary/Chest: Effort normal. No respiratory distress.   Coarse breath sounds  diffusely   Genitourinary:   Genitourinary Comments: L nephrostomy tube in place   Musculoskeletal: She exhibits no edema.   Neurological: She is alert.   Skin: Skin is warm and dry. No erythema.   Psychiatric: She has a normal mood and affect. Her behavior is normal.       Significant Labs: All pertinent lab results from the last 24 hours have been reviewed.    Significant Imaging: Reviewed in EPIC.

## 2019-06-10 NOTE — PLAN OF CARE
Problem: Occupational Therapy Goal  Goal: Occupational Therapy Goal  Goals to be met by: 6/20/19     Patient will increase functional independence with ADLs by performing:    Feeding with Modified [place].  UE Dressing with SBA.  LE Dressing with Minimal Assistance.  Grooming while standing at sink with Stand-by Assistance.  Toileting from toilet with Contact Guard Assistance for hygiene and clothing management.   Toilet transfer to bedside commode with Contact Guard Assistance.    Outcome: Ongoing (interventions implemented as appropriate)  OT eval completed, and above goals established. PRIMITIVO Dash  6/10/2019

## 2019-06-10 NOTE — ASSESSMENT & PLAN NOTE
60 yo female with readmission to hospital from [facility]with history of Asthma, HTN, HLD,  CAD (LAD GINGER proximal and distal 2013 and GINGER ostial LAD 2/2014), HFpEF, NSTEMI, T2DM, Obesity, Sleep Apnea who has had multiple complications s/p spinal surgery. Readmitted with GI bleeds from rectal bleeding ulcer and NSTEMI with resp failure.    The palliative medicine team will contiue to follow to help with ongoing support in a prolonged recovery period.     We will also help aid in medical decision making and goals of care where needed.

## 2019-06-10 NOTE — ASSESSMENT & PLAN NOTE
--concern for acute on chronic heart failure exacerbation  --TTE on 05/06 with reduced EF 40-45%, down from 53% on echo from 05/09  --new WMA and grade II diastolic dysfunction and moderate mitral regurgitation  --diurese with lasix  --continue medical management lopressor and losartan

## 2019-06-10 NOTE — PLAN OF CARE
Per kush, dc plan is LTAC.  SW met w/pt/spouse to discuss dc plan.  SW [place] all LTACs in the [place] area.  Pt/spouse chose [facility] as their [facility] choice.    Blast referral for ltacs requested through the caremanagement team.    Kristine Brandt LMSW  [facility]  X [phone]

## 2019-06-10 NOTE — PROGRESS NOTES
[facility]  Cardiology  Progress Note    Patient Name: Mariann Huff  MRN: [id number]  Admission Date: 6/6/2019  Hospital Length of Stay: 4 days  Code Status: Full Code   Attending Physician: Paul Molina MD   Primary Care Physician: Jasbir Haney MD  Expected Discharge Date:   Principal Problem:Acute on chronic respiratory failure with hypoxia    Subjective:     Interim History: No acute issues overnight. Patient offers no new complaints, reports slight interval improvement in breathing.     Hemoglobin remains stable.     Review of Systems   Unable to perform ROS: other (difficult to obtain due to trach status)   Constitution: Negative for fever.   Cardiovascular: Negative for chest pain, irregular heartbeat and near-syncope.   Respiratory: Positive for shortness of breath (improved).      Objective:     Vital Signs (Most Recent):  Temp: 98.8 °F (37.1 °C) (06/10/19 0715)  Pulse: 78 (06/10/19 1100)  Resp: (!) 24 (06/10/19 1100)  BP: 132/66 (06/10/19 1100)  SpO2: 95 % (06/10/19 1100) Vital Signs (24h Range):  Temp:  [98.4 °F (36.9 °C)-101.1 °F (38.4 °C)] 98.8 °F (37.1 °C)  Pulse:  [] 78  Resp:  [16-40] 24  SpO2:  [95 %-100 %] 95 %  BP: ()/(53-89) 132/66     Weight: 69.9 kg (154 lb)  Body mass index is 25.63 kg/m².    SpO2: 95 %  O2 Device (Oxygen Therapy): Trach Collar      Intake/Output Summary (Last 24 hours) at 6/10/2019 1148  Last data filed at 6/10/2019 1100  Gross per 24 hour   Intake 1853 ml   Output 3100 ml   Net -1247 ml       Lines/Drains/Airways     Peripherally Inserted Central Catheter Line                 PICC Double Lumen 05/24/19 0900 right brachial 17 days          Drain                 Nephrostomy 04/21/19 0629 Left 8 Fr. 50 days         Gastrostomy/Enterostomy 05/02/19 1134 Percutaneous endoscopic gastrostomy (PEG) LUQ decompression;feeding 39 days         Urethral Catheter 06/06/19 1835 16 Fr. 3 days          Airway                 Surgical Airway 06/03/19 1234  Shiley Cuffed 6 days                Physical Exam   Constitutional: She is cooperative. Ill appearance: chronic ill appearance.   HENT:   Trach status on vent   Neck: Normal range of motion. Neck supple.   Difficult to visualize JVD   Cardiovascular: Normal rate, regular rhythm and intact distal pulses.   Pulmonary/Chest: Effort normal. No respiratory distress.   Coarse breath sounds diffusely   Genitourinary:   Genitourinary Comments: L nephrostomy tube in place   Musculoskeletal: She exhibits no edema.   Neurological: She is alert.   Skin: Skin is warm and dry. No erythema.   Psychiatric: She has a normal mood and affect. Her behavior is normal.       Significant Labs: All pertinent lab results from the last 24 hours have been reviewed.    Significant Imaging: Reviewed in EPIC.    Assessment and Plan:        1- NSTEMI  2- Acute on chronic hypoxemic respiratory failure  3- Acute on chronic heart failure  58 yo F with PMHx significant for CAD s/p PCI to LAD (2013, 2014) with known LCx 95% disease not revascularized due unsuccessful attempt in the past, HTN, DLD, DM2, and recent prolonged hospitalization for sepsis 2/2 bacteremia from intra-abdominal abscess s/p I&D which was c/b prolonged resp failure s/p trach/PEG, GI bleed 2/2 rectal ulcer, and BL UE / RLE DVT s/p IVC filter who is re-admitted on 6/6 with acute hypoxemic respiratory failure. Found to have elevated Troponin (6) in the setting of profound anemia (Hbg 6), s/p 2u pRBC , fevers (Tmx 102.3) and tachycardia.     Patient is asymptomatic and denies anginal complaints / index symptoms.     TTE shows decrease in LVEF to 40-45% with new WMA in mid inferolateral, apical lateral and mid-apical posterior walls.    Recs:  -- Unfortunately with her active anemia requiring PRBC transfusions and sepsis, she is not a current candidate for aggressive intervention at this time from cardiology perspective.   -- Due to mildly reduced EF recommend switching to Toprol XL  50 mg daily (from Lopressor) and adding losartan 25 mg daily for GDMT. Please titrate up beta-blocker to achieve HR 55-60s.  -- Agree with IV diuresis to achieve euvolemia and optimize respiratory status.  -- Continue ASA 81 mg daily (if deemed safe from GI perspective) and high intensity statin therapy due to known coronary artery disease.  -- Monitor I/Os and daily weights.   -- Needs close follow up with General Cardiology on discharge.       Cardiology will sign off, call back with questions.     Tahmina Moreno MD  Cardiology Consults (Ext [phone])   [facility]  I have personally taken the history and examined the patient and agree with the resident's note as stated above.  Could switch to Metoprolol XL 50 mg daily and add Losartan 25 mg.

## 2019-06-10 NOTE — PROGRESS NOTES
Placed pt back on ventilator to rest overnight. Will place on trach collar in the am. Pt is tolerating well and will continue to monitor.

## 2019-06-10 NOTE — PROGRESS NOTES
[facility]  Critical Care Medicine  Progress Note    Patient Name: Mariann Huff  MRN: [id number]  Admission Date: 6/6/2019  Hospital Length of Stay: 4 days  Code Status: Full Code  Attending Provider: Paul Molina MD  Primary Care Provider: Jasbir Haney MD   Principal Problem: Acute on chronic respiratory failure with hypoxia    Subjective:     HPI:  Mariann Huff is a 58 y/o female with history of Asthma, HTN, HLD,  CAD (LAD GINGER proximal and distal 2013 and GINGER ostial LAD 2/2014), HFpEF, NSTEMI, T2DM, Obesity, Sleep Apnea, and back pain who presented to the ED on 6/6/19 with acute shortness of breath started last night.      Mrs. Huff has had two recent hospitalizations. 4/12/19-4/14/19 for L5-S1 OLIF with Neurosurgery with intraoperaative left ureteral injury with ureteroureteral anastomosis and ureteral stent placement. Second admission, 4/20/19-6/5/19 for intraabdominal abscess s/p washout and ligation of left ureter with nephrostomy tube placement on 4/21/19 and tracheostomy and PEG placement on 5/2/19.  Most recent hospitalization complicated by BUE and RLE DVT s/p IVC filter on 5/29/19 given concern for GIB with workup revealing rectal ulcer requiring pRBC transfusion.  She was followed by ID during admission and is being treated with long term antibiotic therapy for E coli and Staph lugdenesis bacteremia with IV cefazolin and rifampin with end dated 6/18/19 given hardware.  She also completed 7 day course of cefepime for HAP (pseudomonas) on 5/25/19 and candida glabrata UTI that she received 7 days of high dose fluconazole.   She was discharged to rehab.    In the ED, she was started empirically on vancomycin and cefepime for possible PNA, CTA chest ordered for possible PE.  Labs remarkable for BNP 1403, lactate wnl, wbc 13 K, and sCr 1.4 ( from 0.8).  She was given a duo neb and placed on PSV. She is being admitted to the ICU with critical care medicine for further workup and  evaluation.      Hospital/ICU Course:  Ms. Huff was admitted to the MICU on 06/06. The morning of 06/07/19 her Hb had dropped below 7 so she was transfused 2 units of PRBCs for acute blood loss from bleeding rectal ulcer. Cardiology was consulted for concern of NSTEMI 2/2 increasing Troponin; however, recommended not to treat as ACS because of bleeding and to manage with medical therapy. Repeat echo with reduced EF 40-45% from 53% and new diastolic dysfunction, wall motion abnormalities and worsening mitral regurgitation from previous echo on 05/09/19. She is diuresing well with lasix 60mg IV TID. Her sputum culture is positive for pseudomonas with resistance to pip tazo so abx changed to meropenem. ID following. Daily trach collar trials continue with a noted improvement on 06/10/19. LTACH consult placed         Review of Systems   Reason unable to perform ROS: limited jolly to trach.   Respiratory: Positive for shortness of breath. Negative for wheezing.    Cardiovascular: Negative for chest pain and palpitations.   Gastrointestinal: Negative for abdominal distention, abdominal pain, diarrhea and nausea.   Musculoskeletal: Positive for back pain.     Objective:     Vital Signs (Most Recent):  Temp: 98.4 °F (36.9 °C) (06/10/19 1115)  Pulse: 81 (06/10/19 1525)  Resp: (!) 21 (06/10/19 1525)  BP: (!) 151/77 (06/10/19 1300)  SpO2: (!) 94 % (06/10/19 1525) Vital Signs (24h Range):  Temp:  [98.4 °F (36.9 °C)-98.8 °F (37.1 °C)] 98.4 °F (36.9 °C)  Pulse:  [] 81  Resp:  [16-40] 21  SpO2:  [94 %-100 %] 94 %  BP: ()/(53-89) 151/77   Weight: 69.9 kg (154 lb)  Body mass index is 25.63 kg/m².      Intake/Output Summary (Last 24 hours) at 6/10/2019 1529  Last data filed at 6/10/2019 1300  Gross per 24 hour   Intake 1553 ml   Output 2620 ml   Net -1067 ml       Physical Exam   Constitutional: She is oriented to person, place, and time. Vital signs are normal. She appears well-developed. She is cooperative. She is easily  aroused. She has a sickly appearance. No distress. Cervical collar in place.   On ventilator   HENT:   Head: Normocephalic and atraumatic.   Eyes: Pupils are equal, round, and reactive to light. Conjunctivae are normal. Right eye exhibits no exudate. Left eye exhibits no exudate. Right conjunctiva has no hemorrhage. Left conjunctiva has no hemorrhage. No scleral icterus. Right eye exhibits no nystagmus. Left eye exhibits no nystagmus.   Neck: Trachea normal. No neck rigidity. No tracheal deviation present.   Cardiovascular: Normal rate, regular rhythm and normal heart sounds. PMI is not displaced. Exam reveals no gallop and no friction rub.   No murmur heard.  Pulses:       Radial pulses are 1+ on the right side, and 1+ on the left side.        Dorsalis pedis pulses are 1+ on the right side, and 1+ on the left side.   Pulmonary/Chest: Effort normal and breath sounds normal. No accessory muscle usage. No respiratory distress. She has no wheezes. She has no rhonchi. She has no rales.   Abdominal: Soft. Normal appearance and bowel sounds are normal. There is no tenderness.   Musculoskeletal: Normal range of motion.   Neurological: She is alert, oriented to person, place, and time and easily aroused. No cranial nerve deficit or sensory deficit. GCS eye subscore is 4. GCS verbal subscore is 1. GCS motor subscore is 6.   SAHA, no focal deficits noted, follows commands   Skin: Skin is warm and dry. Capillary refill takes 2 to 3 seconds. She is not diaphoretic. No cyanosis. Nails show no clubbing.   Psychiatric: She has a normal mood and affect. Her behavior is normal.   Nursing note and vitals reviewed.      Vents:  Vent Mode: A/C (06/10/19 0848)  Ventilator Initiated: Yes (06/06/19 1416)  Set Rate: 16 bmp (06/10/19 0848)  Vt Set: 400 mL (06/10/19 0848)  Pressure Support: 10 cmH20 (06/08/19 1300)  PEEP/CPAP: 5 cmH20 (06/10/19 0848)  Oxygen Concentration (%): 28 (06/10/19 1525)  Peak Airway Pressure: 23 cmH2O (06/10/19  0848)  Plateau Pressure: 19 cmH20 (06/10/19 0848)  Total Ve: 7.06 mL (06/10/19 0848)  F/VT Ratio<105 (RSBI): (!) 37.04 (06/10/19 0848)  Lines/Drains/Airways     Peripherally Inserted Central Catheter Line                 PICC Double Lumen 05/24/19 0900 right brachial 17 days          Drain                 Nephrostomy 04/21/19 0629 Left 8 Fr. 50 days         Gastrostomy/Enterostomy 05/02/19 1134 Percutaneous endoscopic gastrostomy (PEG) LUQ decompression;feeding 39 days         Urethral Catheter 06/06/19 1835 16 Fr. 3 days          Airway                 Surgical Airway 06/03/19 1234 Shiley Cuffed 7 days              Significant Labs:    CBC/Anemia Profile:  Recent Labs   Lab 06/09/19  0400 06/10/19  0453   WBC 7.45 8.64   HGB 8.4* 8.7*   HCT 28.0* 29.3*    263   MCV 96 92   RDW 16.0* 15.8*        Chemistries:  Recent Labs   Lab 06/09/19  0400 06/10/19  0453   * 149*   K 4.3 4.2   * 111*   CO2 28 28   BUN 38* 39*   CREATININE 1.1 1.3   CALCIUM 8.9 8.9   ALBUMIN 1.7* 1.8*   PROT 6.9 7.3   BILITOT 0.8 0.5   ALKPHOS 134 134   ALT 10 10   AST 26 24   MG 2.6 2.9*   PHOS 3.2 3.2       All pertinent labs within the past 24 hours have been reviewed.    Significant Imaging:  I have reviewed all pertinent imaging results/findings within the past 24 hours.      ABG  Recent Labs   Lab 06/08/19  0929   PH 7.397   PO2 36*   PCO2 45.7*   HCO3 28.1*   BE 3     Assessment/Plan:     Pulmonary  * Acute on chronic respiratory failure with hypoxia  --Suspect volume overload in the setting of NSTEMI with concomitant pseudomonas pna.  --continue lasix  --continue meropenem  --Continue trach collar trials daily.   --place on vent at night    Pneumonia of both lungs due to Pseudomonas species  --change pip tazo to meropenem  --ID following  --Present on admission.   --see respiratory failure    Cardiac/Vascular  Elevated troponin  --Cardiology following.   --Unable to anticoagulate.   --On aspirin + lopressor.     (HFpEF)  heart failure with preserved ejection fraction  --concern for acute on chronic heart failure exacerbation  --TTE on 05/06 with reduced EF 40-45%, down from 53% on echo from 05/09  --new WMA and grade II diastolic dysfunction and moderate mitral regurgitation  --diurese with lasix  --continue medical management lopressor and losartan       Essential hypertension  --holding antihypertensives (amlodipine and chlorthalidone) in setting of normotension  --continuing to diurese with lasix TID      NSTEMI (non-ST elevated myocardial infarction)  --Cardiology following.   --On aspirin and lopressot  --Holding on anticoagulation due to recent bleeding from rectal ulcer.       CAD (coronary artery disease)  --rising troponin with EKG concerning for lateral t wave changes in the setting of known LAD stents.  Denies chest pain  --ASA  --consult cardiology  --trend troponin levels  --TTE    Oncology  Normocytic anemia  --suspect secondary to prolonged hospitalization with critical illness and recent rectal bleeding.  --hgb 6.4 on 06/07 Transfused 2 units of PRBCs.   --No current signs of bleeding.     Endocrine  Type 2 diabetes mellitus, with long-term current use of insulin  --A1c 5.4.    --frequent glucose checks with [facility]        Critical Care Time: 60 minutes  Critical secondary to Patient has a condition that poses threat to life and bodily function: Acute respiratory failure      Critical care was time spent personally by me on the following activities: development of treatment plan with patient or surrogate and bedside caregivers, discussions with consultants, evaluation of patient's response to treatment, examination of patient, ordering and performing treatments and interventions, ordering and review of laboratory studies, ordering and review of radiographic studies, pulse oximetry, re-evaluation of patient's condition. This critical care time did not overlap with that of any other provider or involve time for any  procedures.     Jaron Iraheta NP  Critical Care Medicine  [facility]

## 2019-06-10 NOTE — SUBJECTIVE & OBJECTIVE
Review of Systems   Reason unable to perform ROS: limited jolly to trach.   Respiratory: Positive for shortness of breath. Negative for wheezing.    Cardiovascular: Negative for chest pain and palpitations.   Gastrointestinal: Negative for abdominal distention, abdominal pain, diarrhea and nausea.   Musculoskeletal: Positive for back pain.     Objective:     Vital Signs (Most Recent):  Temp: 98.4 °F (36.9 °C) (06/10/19 1115)  Pulse: 81 (06/10/19 1525)  Resp: (!) 21 (06/10/19 1525)  BP: (!) 151/77 (06/10/19 1300)  SpO2: (!) 94 % (06/10/19 1525) Vital Signs (24h Range):  Temp:  [98.4 °F (36.9 °C)-98.8 °F (37.1 °C)] 98.4 °F (36.9 °C)  Pulse:  [] 81  Resp:  [16-40] 21  SpO2:  [94 %-100 %] 94 %  BP: ()/(53-89) 151/77   Weight: 69.9 kg (154 lb)  Body mass index is 25.63 kg/m².      Intake/Output Summary (Last 24 hours) at 6/10/2019 1529  Last data filed at 6/10/2019 1300  Gross per 24 hour   Intake 1553 ml   Output 2620 ml   Net -1067 ml       Physical Exam   Constitutional: She is oriented to person, place, and time. Vital signs are normal. She appears well-developed. She is cooperative. She is easily aroused. She has a sickly appearance. No distress. Cervical collar in place.   On ventilator   HENT:   Head: Normocephalic and atraumatic.   Eyes: Pupils are equal, round, and reactive to light. Conjunctivae are normal. Right eye exhibits no exudate. Left eye exhibits no exudate. Right conjunctiva has no hemorrhage. Left conjunctiva has no hemorrhage. No scleral icterus. Right eye exhibits no nystagmus. Left eye exhibits no nystagmus.   Neck: Trachea normal. No neck rigidity. No tracheal deviation present.   Cardiovascular: Normal rate, regular rhythm and normal heart sounds. PMI is not displaced. Exam reveals no gallop and no friction rub.   No murmur heard.  Pulses:       Radial pulses are 1+ on the right side, and 1+ on the left side.        Dorsalis pedis pulses are 1+ on the right side, and 1+ on the left  side.   Pulmonary/Chest: Effort normal and breath sounds normal. No accessory muscle usage. No respiratory distress. She has no wheezes. She has no rhonchi. She has no rales.   Abdominal: Soft. Normal appearance and bowel sounds are normal. There is no tenderness.   Musculoskeletal: Normal range of motion.   Neurological: She is alert, oriented to person, place, and time and easily aroused. No cranial nerve deficit or sensory deficit. GCS eye subscore is 4. GCS verbal subscore is 1. GCS motor subscore is 6.   SAHA, no focal deficits noted, follows commands   Skin: Skin is warm and dry. Capillary refill takes 2 to 3 seconds. She is not diaphoretic. No cyanosis. Nails show no clubbing.   Psychiatric: She has a normal mood and affect. Her behavior is normal.   Nursing note and vitals reviewed.      Vents:  Vent Mode: A/C (06/10/19 0848)  Ventilator Initiated: Yes (06/06/19 1416)  Set Rate: 16 bmp (06/10/19 0848)  Vt Set: 400 mL (06/10/19 0848)  Pressure Support: 10 cmH20 (06/08/19 1300)  PEEP/CPAP: 5 cmH20 (06/10/19 0848)  Oxygen Concentration (%): 28 (06/10/19 1525)  Peak Airway Pressure: 23 cmH2O (06/10/19 0848)  Plateau Pressure: 19 cmH20 (06/10/19 0848)  Total Ve: 7.06 mL (06/10/19 0848)  F/VT Ratio<105 (RSBI): (!) 37.04 (06/10/19 0848)  Lines/Drains/Airways     Peripherally Inserted Central Catheter Line                 PICC Double Lumen 05/24/19 0900 right brachial 17 days          Drain                 Nephrostomy 04/21/19 0629 Left 8 Fr. 50 days         Gastrostomy/Enterostomy 05/02/19 1134 Percutaneous endoscopic gastrostomy (PEG) LUQ decompression;feeding 39 days         Urethral Catheter 06/06/19 1835 16 Fr. 3 days          Airway                 Surgical Airway 06/03/19 1234 Shiley Cuffed 7 days              Significant Labs:    CBC/Anemia Profile:  Recent Labs   Lab 06/09/19  0400 06/10/19  0453   WBC 7.45 8.64   HGB 8.4* 8.7*   HCT 28.0* 29.3*    263   MCV 96 92   RDW 16.0* 15.8*         Chemistries:  Recent Labs   Lab 06/09/19  0400 06/10/19  0453   * 149*   K 4.3 4.2   * 111*   CO2 28 28   BUN 38* 39*   CREATININE 1.1 1.3   CALCIUM 8.9 8.9   ALBUMIN 1.7* 1.8*   PROT 6.9 7.3   BILITOT 0.8 0.5   ALKPHOS 134 134   ALT 10 10   AST 26 24   MG 2.6 2.9*   PHOS 3.2 3.2       All pertinent labs within the past 24 hours have been reviewed.    Significant Imaging:  I have reviewed all pertinent imaging results/findings within the past 24 hours.

## 2019-06-10 NOTE — ASSESSMENT & PLAN NOTE
--holding antihypertensives (amlodipine and chlorthalidone) in setting of normotension  --continuing to diurese with lasix TID

## 2019-06-10 NOTE — SUBJECTIVE & OBJECTIVE
Interval History: Pt seen at the bedside.  No family present.  Pt engaged but admits to being disheartened    Past Medical History:   Diagnosis Date    Anticoagulant long-term use     Asthma     Back pain     Bradycardia, unspecified 2019    The etiology of the bradycardic episode is unclear.  The have appear to be respiratory in origin (at least the 1st episode).  We will continue to monitor carefully.  We are awaiting evaluation by Cardiology.      CAD (coronary artery disease)     s/p stentimg  (2), (1)    Carotid artery stenosis     Diabetes mellitus type 2 in obese     HTN (hypertension), benign     Hyperlipidemia     Keloid cicatrix     NPDR (nonproliferative diabetic retinopathy) 2015    NSTEMI (non-ST elevated myocardial infarction)     Nuclear sclerosis - Right Eye 3/18/2014    Primary localized osteoarthrosis, lower leg 2014    Sleep apnea        Past Surgical History:   Procedure Laterality Date    BRONCHOSCOPY N/A 2019    Performed by [facility] at [facility] OR 2ND FLR    CARDIAC CATHETERIZATION      cataract extraction left eye      cataracts      CATHETERIZATION, HEART, LEFT Left 2014    Performed by Wilman Kim MD at [facility] CATH LAB     SECTION, LOW TRANSVERSE      COLONOSCOPY N/A 2019    Performed by Al Alaniz MD at [facility] ENDO (2ND FLR)    CORONARY ANGIOPLASTY      Creation, Nephrostomy, Percutaneous Left 2019    Performed by Karina Surgeon at [facility] KARINA    CREATION, TRACHEOSTOMY N/A 2019    Performed by Sean [facility]ano MD at [facility] OR 2ND FLR    EGD, WITH PEG TUBE INSERTION  2019    Performed by Sean [facility]ano MD at [facility] OR 2ND FLR    ESOPHAGOGASTRODUODENOSCOPY (EGD) N/A 2016    Performed by Gardenia Adamson MD at [facility] ENDO (4TH FLR)    EXCISION TURBINATE, SUBMUCOUS      FUSION, SPINE, LUMBAR, ANTERIOR APPROACH Left L5-S1 Anterior to Psoa Interbody Fusion, L5-S1 Posterior Instrumentation  Left 4/12/2019    Per[facility] by [facility] at [facility] OR 2ND FLR    HAND SURGERY Left     HAND SURGERY Right     torn ligament repair/ Dr. Yeboah    HYSTERECTOMY      Injection,steroid,epidural,transforaminal approach - Bilateral - S1 Bilateral 9/25/2018    Performed by [facility]l [facility]u [facility] at [facility] PAIN MGT    Injection-steroid-epidural-caudal N/A 2/7/2019    Performed by Dave Bentley Jr., MD at [facility] PAIN MGT    INSERTION-INTRAOCULAR LENS (IOL) Right 9/1/2015    Performed by Good Domingo MD at [facility] OR 1ST FLR    LAPAROTOMY, EXPLORATORY; LIGATION OF LEFT URETER; DOUBLE J STENT REMOVAL LEFT URETER Left 4/20/2019    Performed by Paul Carlson MD at [facility] OR 2ND FLR    left foot surgery      left wrist surgery      NASAL SEPTUM SURGERY  5/7/15    PHACOEMULSIFICATION-ASPIRATION-CATARACT Right 9/1/2015    Performed by Good Domingo MD at [facility] OR 1ST FLR    REPAIR, URETER  4/12/2019    Performed by Mk George MD at [facility] OR 2ND FLR    RESECTION-TURBINATES (SMR) N/A 5/7/2015    Performed by Dileep Dubois III, MD at [facility] OR 2ND FLR    rt elbow surgery      S/P LAD COATED STENT  05/14/2010    6 total     S/P OM1 STENT  08/2003    SEPTOPLASTY N/A 5/7/2015    Performed by Dileep Dubois III, MD at [facility] OR 2ND FLR    SIGMOIDOSCOPY, FLEXIBLE N/A 5/21/2019    Performed by ALBERTO Amin MD at [facility] ENDO (2ND FLR)    SIGMOIDOSCOPY, FLEXIBLE N/A 5/13/2019    Performed by ALBERTO Amin MD at [facility] ENDO (2ND FLR)    SINUS SURGERY      F.E.S.S.    SINUS SURGERY FUNCTIONAL ENDOSCOPIC WITH NAVIGATION WITH MAXILLARIES, ETHMOIDS, LEFT SPHENOID, LEFT LOLY N/A 5/7/2015    Performed by Dileep Dubois III, MD at [facility] OR 2ND FLR    STENT, URETERAL placement  4/12/2019    [facility]    TUBAL LIGATION         Review of patient's allergies indicates:  No Known Allergies    Medications:  Continuous Infusions:  Scheduled Meds:   aspirin   81 mg Per G Tube Daily    atorvastatin  80 mg Per G Tube Daily    chlorhexidine  15 mL Mouth/Throat BID    famotidine  20 mg Per G Tube Daily    furosemide  60 mg Intravenous TID    heparin (porcine)  5,000 Units Subcutaneous Q8H    meropenem (MERREM) IVPB  1 g Intravenous Q12H    metoprolol  25 mg Per G Tube BID    rifAMpin (RIFADIN) IVPB  300 mg Intravenous Q12H    scopolamine  1 patch Transdermal Q3 Days    sodium chloride 0.9%  3 mL Intravenous Q8H     PRN Meds:sodium chloride, sodium chloride, acetaminophen, albuterol-ipratropium, Dextrose 10% Bolus, Dextrose 10% Bolus, [] fentaNYL **FOLLOWED BY** fentaNYL, glucagon (human recombinant), insulin aspart U-100, loperamide, magnesium sulfate IVPB, magnesium sulfate IVPB, morphine, nitroGLYCERIN, ondansetron, potassium chloride 10%, potassium chloride 10%, potassium chloride 10%, potassium, sodium phosphates, potassium, sodium phosphates, potassium, sodium phosphates, promethazine (PHENERGAN) IVPB, sodium chloride 0.9%    Family History     Problem Relation (Age of Onset)    Diabetes Mother, Father, Sister, Brother, Sister    Heart attack Father    Heart disease Mother    Leukemia Father    No Known Problems Sister, Brother, Brother, Maternal Grandmother, Maternal Grandfather, Paternal Grandmother, Paternal Grandfather, Son, Son, Maternal Aunt, Maternal Uncle, Paternal Aunt, Paternal Uncle        Tobacco Use    Smoking status: Never Smoker    Smokeless tobacco: Never Used   Substance and Sexual Activity    Alcohol use: No     Alcohol/week: 0.0 oz    Drug use: No    Sexual activity: Yes     Partners: Male     Birth control/protection: Post-menopausal     Comment:        Review of Systems  Objective:     Vital Signs (Most Recent):  Temp: 98.8 °F (37.1 °C) (06/10/19 0300)  Pulse: 74 (06/10/19 0848)  Resp: 16 (06/10/19 0848)  BP: 124/70 (06/10/19 0404)  SpO2: 100 % (06/10/19 0848) Vital Signs (24h Range):  Temp:  [98.4 °F (36.9 °C)-101.1 °F  (38.4 °C)] 98.8 °F (37.1 °C)  Pulse:  [] 74  Resp:  [8-40] 16  SpO2:  [95 %-100 %] 100 %  BP: ()/(53-88) 124/70     Weight: 69.9 kg (154 lb)  Body mass index is 25.63 kg/m².    Review of Symptoms  Symptom Assessment (ESAS 0-10 scale)   ESAS 0 1 2 3 4 5 6 7 8 9 10   Pain   x           Dyspnea   x           Anxiety x             Nausea x             Depression    x           Anorexia x             Fatigue   x           Insomnia x             Restlessness  x             Agitation x             CAM / Delirium _x_ --  ___+   Constipation     x__ --  ___+   Diarrhea           x_ --  ___+  Bowel Management Plan (BMP): Yes    Comments: per MAR    Pain Assessment: pt denies    OME in 24 hours: 0    Performance Status: 40    ECOG Performance Status Grade: 4 - Completely disabled    Physical Exam  Constitutional: She appears well-developed. She has a sickly appearance. No distress.   HENT:   Head: Normocephalic.   Mouth/Throat: Oropharynx is clear and moist.   Eyes: Pupils are equal, round, and reactive to light. Right eye exhibits normal extraocular motion and no nystagmus. Left eye exhibits normal extraocular motion and no nystagmus.   Cardiovascular: Regular rhythm, normal heart sounds and intact distal pulses. Tachycardia present.   Warm extremities   Pulmonary/Chest: no increased WOB; rales throughout anteriorly  Tracheostomy present.  Thick, yellow secretions   Abdominal: Soft. Bowel sounds are normal. There is no tenderness.   Genitourinary:   Genitourinary Comments: Left nephrostomy tube with clear, sanford output.  Urine catheter with clear, sanford output   Neurological: She is alert. No sensory deficit.   Alert, following request, mouthing words, moving all extremities equally.    Skin: Skin is warm and dry. She is not diaphoretic.   Psychiatric: Her mood appears anxious.   Significant Labs: All pertinent labs within the past 24 hours have been reviewed.  CBC:   Recent Labs   Lab 06/10/19  0453   WBC 8.64    HGB 8.7*   HCT 29.3*   MCV 92        BMP:  Recent Labs   Lab 06/10/19  0453   *   *   K 4.2   *   CO2 28   BUN 39*   CREATININE 1.3   CALCIUM 8.9   MG 2.9*     LFT:  Lab Results   Component Value Date    AST 24 06/10/2019    ALKPHOS 134 06/10/2019    BILITOT 0.5 06/10/2019     Albumin:   Albumin   Date Value Ref Range Status   06/10/2019 1.8 (L) 3.5 - 5.2 g/dL Final     Protein:   Total Protein   Date Value Ref Range Status   06/10/2019 7.3 6.0 - 8.4 g/dL Final     Lactic acid:   Lab Results   Component Value Date    LACTATE 1.8 06/06/2019    LACTATE 0.8 05/13/2019       Significant Imaging: I have reviewed all pertinent imaging results/findings within the past 24 hours.    Advance Care Planning   Advanced Directives::  Living Will: No  LaPOST: No  Do Not Resuscitate Status: No  Medical Power of :  is surrogate DM    Decision-Making Capacity: Patient answered questions       Living Arrangements: Lives with spouse    Psychosocial/Cultural:  40 years, 2 adult children, 3 grandchildren.        Spiritual:     F- Mariella and Belief: Uatsdin    I - Importance: central to family life  .  C - Community: good support but less now than previously    A - Address in Care: offered  visits

## 2019-06-10 NOTE — ASSESSMENT & PLAN NOTE
--change pip tazo to meropenem  --ID following  --Present on admission.   --see respiratory failure

## 2019-06-10 NOTE — ASSESSMENT & PLAN NOTE
60 y/o female well known to ID with HTN, DMII, CAD, NSTEMI, s/p L5-S1 OLIF on 4/12 complicated by intraoperative left ureteral injury s/p ureteroureteral anastomoses and ureteral stent placement who initially presented on 4/20 with fevers, AMS and intraabdominal abscess, s/p washout and ligation of left ureter with nephrostomy tube placement on 4/21.  She  has had a long and complicated hospital course since that time (see HPI), including tracheostomy and PEG tube placement, bilateral upper and lower extremity DVT, s/p IVC filter placement on 5/29, rectal bleeding, rectal ulcer.  She is currently on long term antibiotic therapy for E coli bacteremia and Staph lugdenesis infection (abdominal source) with plan for subsequent suppressive therapy given concern for hardware involvement (end date 6/18/19).  Was treated for HAP (Pseudomonas) and candida glabrata UTI I late May.        ID  Was reconsulted again on 6/2 for recurrent fever.  Workup was unremarkable at that time, she had no recurrence of fever, and she was discharged to rehab on 6/5/19.  Fevers subsequently recurred at rehab and she was sent to ED on 6/6 for repeat imaging.  She was stable on arrival to [facility], but while in ED she became acutely SOB with hypoxemia, became hypotensive.  Was intubated.  Noted to have significant volume overload on CXR, troponins elevated, NSTEMI.  Antibiotics were broadened to vanc and cefepime.  ID re-consulted for antibiotic recommendations.      CTA was negative for PE.  Noted to have bilateral patchy ground glass opacification throughout the lungs; bilateral basilar consolidation overall improved from prior CTA on 5/8; moderate bilateral pleural effusions. Noted to have  persistent fluid collection along the inferior aspect of her healing midline abdominal incision - 3.6 X 8.2 X 2.5 cm - with surrounding inflammatory changes.       Micro:  Repeat blood cultures NGTD   Mini BAL - pseudomonas  U/A - 27 WBC, no bacteria; urine  culture negative    Afebrile today. Leukocytosis resolved.   Fontenot has been exchanged. Nephrostomy tube evaluated. Stitch added.    Blood cultures - NGTD  Respiratory cultures pseudomonas - Intermediate to cefepime and Cipro otherwise sensitive    Stable non septic  Plan  1.  vancomycin d/c'd  2.  Continue  Meroepenem - plan for 5days for presumed PNA  3.  Continue rifampin  mg twice a day.   4.  Recommend IR evaluation to assess if abdominal fluid collection is amenable to drain and send for cultures.   5.  If fevers persist, recommend removing PICC line.   6. Will needs suppression with Cefadroxil and rifampin once meropenem completed  7. ID will follow.

## 2019-06-10 NOTE — PLAN OF CARE
Palliative Care Social Work   Assessment  Name: Mariann Huff  MRN: [id number]  Date of Birth/Age:  1960  Sex: female  Ethnicity: Black or     Primary Language:English. Uses Passy Jacobson Valve on or mouths words.   Needed: no    Attending Physician: Dr. Molina  Reason for Referral: assistance with clarification of goals of care  Consult Order Date: 6/7/19 0938  Primary CM/SW: Jes Coronado RN    Palliative Care Provider: Dr. Garcia    Present during Interview: pt and Pal Care SW    Past & Current Medical Situation:   Diagnosis: Acute on chronic respiratory failure with hypoxia  PMH:   Past Medical History:   Diagnosis Date    Anticoagulant long-term use     Asthma     Back pain     Bradycardia, unspecified 5/8/2019    The etiology of the bradycardic episode is unclear.  The have appear to be respiratory in origin (at least the 1st episode).  We will continue to monitor carefully.  We are awaiting evaluation by Cardiology.      CAD (coronary artery disease)     s/p stentimg 2003 (2),2009 (1)    Carotid artery stenosis     Diabetes mellitus type 2 in obese     HTN (hypertension), benign     Hyperlipidemia     Keloid cicatrix     NPDR (nonproliferative diabetic retinopathy) 8/17/2015    NSTEMI (non-ST elevated myocardial infarction)     Nuclear sclerosis - Right Eye 3/18/2014    Primary localized osteoarthrosis, lower leg 6/18/2014    Sleep apnea      Mental Health/Substance Use History: n/a  Non-traditional Health practices:     Understanding of diagnosis and prognosis: Will benefit from continued clear, direct information regarding dx and px.   Experience/Comfort level with health care system:    Patients Mental Status: alert and oriented    Socio-Economic Factors/Resources:  Address: [place] [address]  Phone Number: [phone] (home)     Marital Status:  x 40 years  Household Composition: Lives with   Children: 2 sons  Relationships with  Family: Pt's main support is her  Shamir. Pt has two sons whom have 3 children. Grandchildren are 6, 3, and 1.    Pt was active in her grandchildren's lives going to Karate and different activities.     Emergency Contacts:   : Shamir Huff: [phone]; [phone]; [phone]    Activities of Daily Living: Assistance with ADLs  Support Systems-Family & Community (Home Health, HME etc): rolling walker, shower chair, medication pump. 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   History: no    Financial Resources:Medicare      Advanced Care Planning & Legal Concerns:   Advanced Directives/Living Will: no   Planning:  no    Power of : no  Surrogate Decision Maker:       Spirituality, Culture & Coping Mechanisms:  F- Mariella and Belief: Mandaen     I - Importance: Has strong mariella    C - Community/Culture Values:     A - Address in Care: Spiritual Care to follow      Strengths/Coping Strategies: Family supportive  Self-Care Activities/Hobbies: Enjoys watching Young and the Restless. Enjoyed going out to eat and hanging out with her grandchildren, going to their activities such as 6 year old's karate.     Goals/Hopes/Expectations: TBD  Fears/Anxiety/Concerns: TBD        Preferences about EOL Environment: (own bed, family nearby, pets, music, etc)  tbd    Complicated Bereavement Risk Assessment Tool (CBRAT)  Reference:  [facility] Clinical Practice Group (May 2016). Bereavement Risk Screening and Management Guidelines.  Retrieved from: http://www.grpcc.com.au/wp-content/uploads//PNOMX-Nfwqdirzpju-Tivrgmrhm-and-Management-Guideline-2016.pdf      Client Characteristics (Bereaved Client)  ? Under 18      no  ? Was a Twin   no  ? Young Spouse   no  ? Elderly Spouse    yes  ? Isolated     "no  ? Lacks Meaningful Social Support   no  ? Dissatisfied with help available during illness   no  ? New to Financial [place] no  ? New to Decision-Making   no   History of Loss (Bereaved Client)  ? Cumulative Multiple Losses   no  ? Previous Mental Health Illnesses   no  ? Current Mental Health Illness   no  ? Other Significant Health Issues   no   ? Migrant/Refugee   no    Illness  ? Inherited Disorder   no  ? Stigmatized Disease in the   no  ?  Family/Community   no  ? Lengthy/Burdensome   yes Relationship with   ? Profound Lifelong Partner   no  ? Highly Dependent    no  ? Antagonistic   no  ? Ambivalent   no  ? Deeply Connected   yes  ? Culturally Defined   no   Death  ? Sudden or Unexpected   yes  ? Traumatic Circumstances Associated with Death   no  ? Significant Cultural/Social Burdens as a result of Death   no   Risk Factors Scores  0-2  Low  3-5  Moderate  5+  High  All persons scoring moderate to high presume to be at risk**    (** It is acknowledged that protective factors and resilience may outweigh apparent risk factors.      Total Risk Factors Score:   Mild to Moderate     Increased risk as patient has had a long hospital stay.  is still working. Pt has two sons and three grandchildren which whom she is active in their lives.    Will benefit from continued follow up and bereavement support.      Discharge Planning Needs/Plan of Care:     Visit made to bedside. [facility] Care SW met with pt. Pt mouthed words and also used her Passy Katina Valve.    SW will continue to establish rapport and provide emotional support to pt and family.      SW informed pt's nurse that pt likes to watch "Young and the Restless".       Will continue to follow.    Anastacia Carrion, MARIA VICTORIAW, [facility]        "

## 2019-06-10 NOTE — PT/OT/SLP EVAL
Occupational Therapy   Evaluation    Name: Mariann Huff  MRN: [id number]  Admitting Diagnosis:  Acute on chronic respiratory failure with hypoxia  ; pt recently admitted with intraabdominal infection and L nephrostomy tube placement following complications post spine surgery on 4/12/19    Recommendations:     Discharge Recommendations: rehabilitation facility  Discharge Equipment Recommendations:  (TBD closer to d/c)  Barriers to discharge:  Decreased caregiver support    Assessment:     Mariann Huff is a 59 y.o. female with a medical diagnosis of Acute on chronic respiratory failure with hypoxia.  Performance deficits affecting function: gait instability, weakness, impaired balance, impaired endurance, impaired self care skills, impaired functional mobilty, pain, impaired cardiopulmonary response to activity, decreased safety awareness, decreased lower extremity function.      Rehab Prognosis: Good; patient would benefit from acute skilled OT services to address these deficits and reach maximum level of function.       Plan:     Patient to be seen 4 x/week to address the above listed problems via therapeutic activities, therapeutic exercises, neuromuscular re-education, self-care/home management  · Plan of Care Expires:    · Plan of Care Reviewed with: patient    Subjective     Chief Complaint: weakness  Patient/Family Comments/goals: to get better and go home    Occupational Profile:  Living Environment: lives with spouse in [facility] with no DAVID  Previous level of function: previously using RW for mobility and mod (I) with ADL  Roles and Routines: homemaker  Equipment Used at Home:  walker, rolling, shower chair(built-in shower seat)  Assistance upon Discharge: spouse works during the day, but will take time off    Pain/Comfort:  · Pain Rating 1: 8/10  · Location - Side 1: Bilateral  · Location - Orientation 1: generalized  · Location 1: back  · Pain Addressed 1: Reposition, Distraction, Pre-medicate for  activity  · Pain Rating Post-Intervention 1: 7/10    Patients cultural, spiritual, Latter-day conflicts given the current situation: no    Objective:     Communicated with: RN prior to session.  Patient found supine with blood pressure cuff, pulse ox (continuous), telemetry, tracheostomy, oxygen, PEG Tube, correa catheter, PICC line(T-tube) upon OT entry to room.    General Precautions: Standard, fall   Orthopedic Precautions:    Braces: (previously pt had LSO, but currently not in pt's room)     Occupational Performance:    Bed Mobility:    · Patient completed Rolling/Turning to Left with  moderate assistance  · Patient completed Scooting/Bridging with with moderate A to EOB  · Patient completed Supine to Sit with moderate assistance and with side rail    Functional Mobility/Transfers:  · Patient completed Sit <> Stand Transfer with moderate assistance  with  no assistive device    · Mod A for SPT to chair  · Functional Mobility: NT    Activities of Daily Living:  · Feeding:  NT PEG  · Grooming: minimum assistance seated EOB  · Upper Body Dressing: minimum assistance doffing/donning front gown  · Lower Body Dressing: maximal assistance     · Toileting: maximal A for pericare after multiple loose BM    Cognitive/Visual Perceptual:  Oriented to: Person, Place, Time and Situation  Follows Commands/attention: Follows multistep  commands  Communication: clear/fluent  Memory:  No Deficits noted  Safety awareness/insight to disability: intact  Coping skills/emotional control: Appropriate to situation    Physical Exam:  Postural examination/scapula alignment:    -       No postural abnormalities identified  Sensation:    -       Intact  Upper Extremity Range of Motion:     -       Right Upper Extremity: WFL  -       Left Upper Extremity: WFL  Upper Extremity Strength:    -       Right Upper Extremity: WFL  -       Left Upper Extremity: WFL   Strength:    -       Right Upper Extremity: WFL  -       Left Upper Extremity:  WFL  Fine Motor Coordination:    -       Intact  Gross motor coordination:   WFL    AMPAC 6 Click ADL:  AMPAC Total Score: 13    Treatment & Education:  Pt ed on OT POC  Pt sat EOB with CGA>>SBA for self-care  Pt stood multiple times for pericare with mod A  Education:    Patient left up in chair with all lines intact, call button in reach, RN notified and RN present    GOALS:   Multidisciplinary Problems     Occupational Therapy Goals        Problem: Occupational Therapy Goal    Goal Priority Disciplines Outcome Interventions   Occupational Therapy Goal     OT, PT/OT Ongoing (interventions implemented as appropriate)    Description:  Goals to be met by: 6/20/19     Patient will increase functional independence with ADLs by performing:    Feeding with Modified [place].  UE Dressing with SBA.  LE Dressing with Minimal Assistance.  Grooming while standing at sink with Stand-by Assistance.  Toileting from toilet with Contact Guard Assistance for hygiene and clothing management.   Toilet transfer to bedside commode with Contact Guard Assistance.                      History:     Past Medical History:   Diagnosis Date    Anticoagulant long-term use     Asthma     Back pain     Bradycardia, unspecified 5/8/2019    The etiology of the bradycardic episode is unclear.  The have appear to be respiratory in origin (at least the 1st episode).  We will continue to monitor carefully.  We are awaiting evaluation by Cardiology.      CAD (coronary artery disease)     s/p stentimg 2003 (2),2009 (1)    Carotid artery stenosis     Diabetes mellitus type 2 in obese     HTN (hypertension), benign     Hyperlipidemia     Keloid cicatrix     NPDR (nonproliferative diabetic retinopathy) 8/17/2015    NSTEMI (non-ST elevated myocardial infarction)     Nuclear sclerosis - Right Eye 3/18/2014    Primary localized osteoarthrosis, lower leg 6/18/2014    Sleep apnea        Past Surgical History:   Procedure Laterality Date     BRONCHOSCOPY N/A 2019    Performed by [facility] at [facility] OR 2ND FLR    CARDIAC CATHETERIZATION      cataract extraction left eye      cataracts      CATHETERIZATION, HEART, LEFT Left 2014    Performed by Wilman Kim MD at [facility] CATH LAB     SECTION, LOW TRANSVERSE      COLONOSCOPY N/A 2019    Performed by Al Alaniz MD at [facility] ENDO (2ND FLR)    CORONARY ANGIOPLASTY      Creation, Nephrostomy, Percutaneous Left 2019    Performed by Karina Surgeon at [facility] KARINA    CREATION, TRACHEOSTOMY N/A 2019    Performed by [facility]ano MD at [facility] OR 2ND FLR    EGD, WITH PEG TUBE INSERTION  2019    Performed by Sean [facility]ano MD at [facility] OR 2ND FLR    ESOPHAGOGASTRODUODENOSCOPY (EGD) N/A 2016    Performed by Gardenia Adamson MD at [facility] ENDO (4TH FLR)    EXCISION TURBINATE, SUBMUCOUS      FUSION, SPINE, LUMBAR, ANTERIOR APPROACH Left L5-S1 Anterior to Psoa Interbody Fusion, L5-S1 Posterior Instrumentation Left 2019    Performed by Mk George MD at [facility] OR 2ND FLR    HAND SURGERY Left     HAND SURGERY Right     torn ligament repair/ Dr. Yeboah    HYSTERECTOMY      Injection,steroid,epidural,transforaminal approach - Bilateral - S1 Bilateral 2018    Performed by Tl Abreu MD at [facility] PAIN MGT    Injection-steroid-epidural-caudal N/A 2019    Performed by Dave Bentley Jr., MD at [facility] PAIN MGT    INSERTION-INTRAOCULAR LENS (IOL) Right 2015    Performed by Good Domingo MD at [facility] OR 1ST FLR    LAPAROTOMY, EXPLORATORY; LIGATION OF LEFT URETER; DOUBLE J STENT REMOVAL LEFT URETER Left 2019    Performed by Paul Carlson MD at [facility] OR 2ND FLR    left foot surgery      left wrist surgery      NASAL SEPTUM SURGERY  5/7/15    PHACOEMULSIFICATION-ASPIRATION-CATARACT Right 2015    Performed by Good Domingo MD at [facility] OR 1ST FLR    REPAIR, URETER  2019    Performed by Mk LUX  MD Ariel at [facility] OR 2ND FLR    RESECTION-TURBINATES (SMR) N/A 5/7/2015    Performed by [place] Dubois [place], MD at [facility] OR 2ND FLR    rt elbow surgery      S/P LAD COATED STENT  05/14/2010    6 total     S/P OM1 STENT  08/2003    SEPTOPLASTY N/A 5/7/2015    [facility]    SIGMOIDOSCOPY, FLEXIBLE N/A 5/21/2019    Performed by ALBERTO Amin MD at [facility] ENDO (2ND FLR)    SIGMOIDOSCOPY, FLEXIBLE N/A 5/13/2019    Performed by ALBERTO Amin MD at [facility] ENDO (2ND FLR)    SINUS SURGERY      F.E.S.S.    SINUS SURGERY FUNCTIONAL ENDOSCOPIC WITH NAVIGATION WITH MAXILLARIES, ETHMOIDS, LEFT SPHENOID, LEFT LOLY N/A 5/7/2015    Per[facility]    STENT, URETERAL placement  4/12/2019    [facility]    TUBAL LIGATION         Time Tracking:     OT Date of Treatment: 06/10/19  OT Start Time: 0926  OT Stop Time: 0950  OT Total Time (min): 24 min    Billable Minutes:Evaluation 10  Self Care/Home Management 14    PRIMITIVO Dash  6/10/2019

## 2019-06-10 NOTE — PT/OT/SLP EVAL
Physical Therapy Evaluation and treatment    Patient Name:  Mariann Huff   MRN:  [id number]    Recommendations:     Discharge Recommendations:  rehabilitation facility   Discharge Equipment Recommendations: (will determine DME needs closer to discharge)   Barriers to discharge: Decreased caregiver support family will not be able to assist pt at current functional level.     Assessment:     Mariann Huff is a 59 y.o. female admitted with a medical diagnosis of Acute on chronic respiratory failure with hypoxia.  She presents with the following impairments/functional limitations:  impaired functional mobilty, gait instability, impaired endurance, decreased lower extremity function, decreased safety awareness  Pt kenyetta treatment well and will benefit from skilled PT 4x/wk. Pt will need inpt rehab when medically stable to maximize functional mobility. Pt was transferred to ED from rehab with SOB. Pt is s/p back surgery 4/12/19, exp lap ligation L ureter 4/21, trach/PEG 5/2/19.    Rehab Prognosis: Good; patient would benefit from acute skilled PT services to address these deficits and reach maximum level of function.    Recent Surgery: * No surgery found *      Plan:     During this hospitalization, patient to be seen 4 x/week to address the identified rehab impairments via gait training, therapeutic activities, therapeutic exercises and progress toward the following goals:    · Plan of Care Expires:  07/07/19    Subjective     Chief Complaint: pt had no complaints during treatment.   Patient/Family Comments/goals:  To get better and go home.   Pain/Comfort:  · Pain Rating 1: 0/10  · Pain Rating Post-Intervention 1: 0/10    Patients cultural, spiritual, Judaism conflicts given the current situation: no    Living Environment:  Pt is homemaker and lives with her  who works full-time. They live in [address] [place] [address] . Pt was Independent prior to back surgery but has used RW since 4/12/19.  Prior to admission,  patients level of function was see above .  Equipment used at home: walker, rolling(built in seat in shower).  DME owned (not currently used): none.  Upon discharge, patient will have assistance from .    Objective:     Communicated with nurse prior to session.  Patient found supine with telemetry, pulse ox (continuous), blood pressure cuff, tracheostomy, peripheral IV, oxygen  upon PT entry to room.    General Precautions: Standard, fall, contact   Orthopedic Precautions:    Braces:       Exams:  · Cognitive Exam:  Patient is oriented to Person, Place, Time and Situation  · RLE ROM: WFL  · RLE Strength: at least 3/5 for major muscle groups  · LLE ROM: WFL  · LLE Strength: at least 3/5 for major muscle groups    Functional Mobility:  · Bed Mobility: pt needed verbal cues for hand placement and sequencing for functional mobility.     · Rolling Left:  moderate assistance  · Supine to Sit: moderate assistance  ·   · Transfers:     · Sit to Stand:  moderate assistance with hand-held assist. Pt stood x 3 reps.   · Bed to Chair: moderate assistance with  hand-held assist  using  Stand Pivot  ·   · Balance: pt was CGA static sitting balance on EOB.        AM-PAC 6 CLICK MOBILITY  Total Score:10     Patient left up in chair with all lines intact and call button in reach.    GOALS:   Multidisciplinary Problems     Physical Therapy Goals        Problem: Physical Therapy Goal    Goal Priority Disciplines Outcome Goal Variances Interventions   Physical Therapy Goal     PT, PT/OT      Description:  Goals to be met by: 19    Patient will increase functional independence with mobility by performin. Supine to sit with Contact Guard Assistance - not met  2. Sit to stand transfer with Contact Guard Assistance with RW -not met  3. Gait  x 100 feet with Contact Guard Assistance using Rolling Walker. -not met  4. Lower extremity exercise program x15 reps with supervision -not met                      History:     Past  Medical History:   Diagnosis Date    Anticoagulant long-term use     Asthma     Back pain     Bradycardia, unspecified 2019    The etiology of the bradycardic episode is unclear.  The have appear to be respiratory in origin (at least the 1st episode).  We will continue to monitor carefully.  We are awaiting evaluation by Cardiology.      CAD (coronary artery disease)     s/p stentimg  (2), (1)    Carotid artery stenosis     Diabetes mellitus type 2 in obese     HTN (hypertension), benign     Hyperlipidemia     Keloid cicatrix     NPDR (nonproliferative diabetic retinopathy) 2015    NSTEMI (non-ST elevated myocardial infarction)     Nuclear sclerosis - Right Eye 3/18/2014    Primary localized osteoarthrosis, lower leg 2014    Sleep apnea        Past Surgical History:   Procedure Laterality Date    BRONCHOSCOPY N/A 2019    Performed by [facility] at [facility] OR 2ND FLR    CARDIAC CATHETERIZATION      cataract extraction left eye      cataracts      CATHETERIZATION, HEART, LEFT Left 2014    Performed by Wilman Kim MD at [facility] CATH LAB     SECTION, LOW TRANSVERSE      COLONOSCOPY N/A 2019    Performed by Al Alaniz MD at [facility] ENDO (2ND FLR)    CORONARY ANGIOPLASTY      Creation, Nephrostomy, Percutaneous Left 2019    Performed by Karina Surgeon at [facility] KARINA    CREATION, TRACHEOSTOMY N/A 2019    Performed by Sean Ruano MD at [facility] OR 2ND FLR    EGD, WITH PEG TUBE INSERTION  2019    Performed by Sean Ruano MD at [facility] OR 2ND FLR    ESOPHAGOGASTRODUODENOSCOPY (EGD) N/A 2016    Performed by Gardenia Adamson MD at [facility] ENDO (4TH FLR)    EXCISION TURBINATE, SUBMUCOUS      FUSION, SPINE, LUMBAR, ANTERIOR APPROACH Left L5-S1 Anterior to Psoa Interbody Fusion, L5-S1 Posterior Instrumentation Left 2019    Performed by Mk George MD at [facility] OR 2ND FLR    HAND SURGERY Left     HAND SURGERY Right      torn ligament repair/ Dr. Yeboah    HYSTERECTOMY      Injection,steroid,epidural,transforaminal approach - Bilateral - S1 Bilateral 9/25/2018    Performed by Tl [facility] at [facility] PAIN MGT    Injection-steroid-epidural-caudal N/A 2/7/2019    Performed by Dave Bentley Jr., MD at [facility] PAIN MGT    INSERTION-INTRAOCULAR LENS (IOL) Right 9/1/2015    Performed by Good [facility]ingo [facility] at [facility] OR 1ST FLR    LAPAROTOMY, EXPLORATORY; LIGATION OF LEFT URETER; DOUBLE J STENT REMOVAL LEFT URETER Left 4/20/2019    Performed by Paul Carlson MD at [facility] OR 2ND FLR    left foot surgery      left wrist surgery      NASAL SEPTUM SURGERY  5/7/15    PHACOEMULSIFICATION-ASPIRATION-CATARACT Right 9/1/2015    Performed by Good Domingo MD at [facility] OR 1ST FLR    REPAIR, URETER  4/12/2019    [facility]    RESECTION-TURBINATES (SMR) N/A 5/7/2015    Performed by Dileep Dubois III, MD at [facility] OR 2ND FLR    rt elbow surgery      S/P LAD COATED STENT  05/14/2010    6 total     S/P OM1 STENT  08/2003    SEPTOPLASTY N/A 5/7/2015    Performed by Dileep Dubois III, MD at [facility] OR 2ND FLR    SIGMOIDOSCOPY, FLEXIBLE N/A 5/21/2019    Performed by ALBERTO Amin MD at [facility] ENDO (2ND FLR)    SIGMOIDOSCOPY, FLEXIBLE N/A 5/13/2019    Performed by ALBERTO Amin MD at [facility] ENDO (2ND FLR)    SINUS SURGERY      F.E.S.S.    SINUS SURGERY FUNCTIONAL ENDOSCOPIC WITH NAVIGATION WITH MAXILLARIES, ETHMOIDS, LEFT SPHENOID, LEFT LOLY N/A 5/7/2015    Performed by Dileep Dubois III, MD at [facility] OR 2ND FLR    STENT, URETERAL placement  4/12/2019    Performed by Robin Boyd MD at [facility] F[facility]    TUBAL LIGATION         Time Tracking:     PT Received On: 06/10/19  PT Start Time: 0930     PT Stop Time: 0955  PT Total Time (min): 25 min     Billable Minutes: Evaluation 8 min and Therapeutic Activity 17 min      Cathy Taylor, PT  06/10/2019

## 2019-06-10 NOTE — PROGRESS NOTES
Pt had 6 beat run VTACH, pt denies lightheadedness or dizziness. Pressure stable @ 137/71. Jaron on CC team notified. NO new orders at this time. Will continue to monitor.

## 2019-06-10 NOTE — SUBJECTIVE & OBJECTIVE
Interval History: No AEON. Afebrile and WBC 10.69,  Has a large amount of mucus but denies sob.  The patient denies any recent fever, chills, or sweats.      Review of Systems   Constitutional: Negative for activity change, chills, diaphoresis and fever.   Respiratory: Negative for cough, shortness of breath and wheezing.         + increased sputum   Cardiovascular: Negative for chest pain.   Gastrointestinal: Negative for abdominal pain, constipation, diarrhea, nausea and vomiting.   Genitourinary: Negative for dysuria, frequency and urgency.   Neurological: Negative for dizziness.   Hematological: Does not bruise/bleed easily.     Objective:     Vital Signs (Most Recent):  Temp: 98.4 °F (36.9 °C) (06/10/19 1115)  Pulse: 74 (06/10/19 1300)  Resp: 20 (06/10/19 1300)  BP: (!) 151/77 (06/10/19 1300)  SpO2: 97 % (06/10/19 1300) Vital Signs (24h Range):  Temp:  [98.4 °F (36.9 °C)-101.1 °F (38.4 °C)] 98.4 °F (36.9 °C)  Pulse:  [] 74  Resp:  [16-40] 20  SpO2:  [95 %-100 %] 97 %  BP: ()/(53-89) 151/77     Weight: 69.9 kg (154 lb)  Body mass index is 25.63 kg/m².    Estimated Creatinine Clearance: 46.3 mL/min (based on SCr of 1.3 mg/dL).    Physical Exam   Constitutional: She is oriented to person, place, and time. She appears well-developed and well-nourished. No distress (appears comfortable).       HENT:   Head: Normocephalic and atraumatic.   Tracheostomy in place - no drainage/erythema noted   Eyes: Conjunctivae are normal. Right eye exhibits no discharge. Left eye exhibits no discharge. No scleral icterus.   Cardiovascular: Normal rate and regular rhythm.   Pulmonary/Chest: No stridor. No tachypnea. No respiratory distress. She has no wheezes. She has no rhonchi. She has no rales (bases).   Crackles at bases     Abdominal: Soft. She exhibits no distension and no mass. There is no tenderness. There is no guarding.   Midline surgical  incision healed - c/d/i.   PEG site clear   Genitourinary:   Genitourinary  Comments: Nephrostomy tube in place, leaking around insertion site.  No purulence noted.      Fontenot to gravity - urine orange tinged   Musculoskeletal: Normal range of motion. She exhibits no edema or tenderness.   Neurological: She is alert and oriented to person, place, and time.   Skin: Skin is warm and dry. No rash noted. She is not diaphoretic.   PICC RUE - site clear, non-tender   Psychiatric: She has a normal mood and affect. Her behavior is normal.   Nursing note and vitals reviewed.      Significant Labs:   Blood Culture:   Recent Labs   Lab 05/20/19  1306 06/01/19  1712 06/01/19  1713 06/06/19  1333 06/06/19  1346   LABBLOO No growth after 5 days. No growth after 5 days. No growth after 5 days. No Growth to date  No Growth to date  No Growth to date  No Growth to date No Growth to date  No Growth to date  No Growth to date  No Growth to date     CBC:   Recent Labs   Lab 06/09/19  0400 06/10/19  0453   WBC 7.45 8.64   HGB 8.4* 8.7*   HCT 28.0* 29.3*    263     CMP:   Recent Labs   Lab 06/09/19  0400 06/10/19  0453   * 149*   K 4.3 4.2   * 111*   CO2 28 28   * 164*   BUN 38* 39*   CREATININE 1.1 1.3   CALCIUM 8.9 8.9   PROT 6.9 7.3   ALBUMIN 1.7* 1.8*   BILITOT 0.8 0.5   ALKPHOS 134 134   AST 26 24   ALT 10 10   ANIONGAP 9 10   EGFRNONAA 55.5* 45.3*     Respiratory Culture:   Recent Labs   Lab 04/30/19  0900 05/08/19  1300 05/19/19  1130 06/06/19  2012   GSRESP <10 epithelial cells per low power field.  No WBC's  No organisms seen Rare WBC's  No organisms seen Few WBC's  Few Gram negative rods <10 epithelial cells per low power field.  Few WBC's   No organisms seen   RESPIRATORYC  --  No growth PSEUDOMONAS AERUGINOSA  Many   No S aureus isolated.  PSEUDOMONAS AERUGINOSA  Few       Wound Culture:   Recent Labs   Lab 04/21/19  0125 05/19/19  1241   LABAERO STAPHYLOCOCCUS LUGDUNENSIS  Rare   No growth     All pertinent labs within the past 24 hours have been  reviewed.    Significant Imaging: I have reviewed all pertinent imaging results/findings within the past 24 hours.   X-Ray Chest AP Portable [[id number]] Resulted: 06/10/19 0910   Order Status: Completed Updated: 06/10/19 0912   Narrative:     EXAMINATION:  XR CHEST AP PORTABLE    CLINICAL HISTORY:  pna;    TECHNIQUE:  Single frontal portable view of the chest was performed.    COMPARISON:  06/06/2019    FINDINGS:  Tracheostomy cannula remains in place with its tip in satisfactory position.  Right-sided PICC again seen with its tip at the cavoatrial junction.  Cardiomegaly, unchanged.  Continued pulmonary vascular congestion and interstitial/alveolar edema.  There may have been a little improvement in aeration at the right lower lung zone.  No pneumothorax or other detrimental change.   Impression:       As above      Electronically signed by: Denzel Doherty MD  Date: 06/10/2019  Time: 09:10   US Upper Extremity Veins Right [[id number]] (Abnormal) Resulted: 06/07/19 0317   Order Status: Completed Updated: 06/07/19 0319   Narrative:     EXAMINATION:  US UPPER EXTREMITY VEINS LEFT; US UPPER EXTREMITY VEINS RIGHT    CLINICAL HISTORY:  swelling;; pain/swelling;    TECHNIQUE:  Duplex and color flow Doppler evaluation and dynamic compression was performed of the bilateral upper extremity veins.    COMPARISON:  Ultrasound 05/12/2019    FINDINGS:  Right central veins: Central venous catheter identified within the axillary vein.  The internal jugular, subclavian, and axillary veins are patent and free of thrombus.    Right arm veins: Central venous catheter identified within the basilic vein.  There is thrombosis of the cephalic vein.  The brachial, and basilic veins are patent and compressible.    Left central veins: Persistent occlusive thrombus within the axillary vein.  The internal jugular and subclavian veins are patent and free of thrombus.    Left arm veins: Persistent occlusive thrombus within the basilic vein.  The  brachial and cephalic veins are patent and compressible.   Impression:       1. Persistent occlusive deep vein thrombosis of the left axillary and basilic veins.  2. Superficial thrombophlebitis of the right cephalic vein.  3. Central venous catheter identified extending from the right basilic vein into the right axillary vein.  This report was flagged in Epic as abnormal.    Electronically signed by resident: Adrián Becker  Date: 06/07/2019  Time: 03:03    Electronically signed by: Pancho Sheikh MD  Date: 06/07/2019  Time: 03:17   US Upper Extremity Veins Left [[id number]] (Abnormal) Resulted: 06/07/19 0317   Order Status: Completed Updated: 06/07/19 0319   Narrative:     EXAMINATION:  US UPPER EXTREMITY VEINS LEFT; US UPPER EXTREMITY VEINS RIGHT    CLINICAL HISTORY:  swelling;; pain/swelling;    TECHNIQUE:  Duplex and color flow Doppler evaluation and dynamic compression was performed of the bilateral upper extremity veins.    COMPARISON:  Ultrasound 05/12/2019    FINDINGS:  Right central veins: Central venous catheter identified within the axillary vein.  The internal jugular, subclavian, and axillary veins are patent and free of thrombus.    Right arm veins: Central venous catheter identified within the basilic vein.  There is thrombosis of the cephalic vein.  The brachial, and basilic veins are patent and compressible.    Left central veins: Persistent occlusive thrombus within the axillary vein.  The internal jugular and subclavian veins are patent and free of thrombus.    Left arm veins: Persistent occlusive thrombus within the basilic vein.  The brachial and cephalic veins are patent and compressible.   Impression:       1. Persistent occlusive deep vein thrombosis of the left axillary and basilic veins.  2. Superficial thrombophlebitis of the right cephalic vein.  3. Central venous catheter identified extending from the right basilic vein into the right axillary vein.  This report was flagged in Epic as  abnormal.    Electronically signed by resident: Adrián Becker  Date: 06/07/2019  Time: 03:03    Electronically signed by: Pancho Sheikh MD  Date: 06/07/2019  Time: 03:17   US Lower Extremity Veins Bilateral [[id number]] (Abnormal) Resulted: 06/07/19 0312   Order Status: Completed Updated: 06/07/19 0314   Narrative:     EXAMINATION:  US LOWER EXTREMITY VEINS BILATERAL    CLINICAL HISTORY:  pain/sweling;    TECHNIQUE:  Duplex and color flow Doppler and dynamic compression was performed of the bilateral lower extremity veins was performed.    COMPARISON:  Ultrasound 05/29/2019, 05/08/2019    FINDINGS:  Right thigh veins: Partially occlusive thrombus within the common femoral vein with completely occlusive thrombus within the superficial femoral vein extending into the popliteal vein.  The deep femoral, and upper greater saphenous veins are patent and free of thrombus.    Right calf veins: There is occlusive thrombus within the posterior tibial veins and peroneal veins.  The anterior tibial veins are patent.    Left thigh veins: Partially occlusive thrombus within the common femoral vein near the femoral saphenous junction extending into the proximal superficial femoral vein.  The popliteal, upper greater saphenous, and deep femoral veins are patent and free of thrombus.    Left calf veins: There is thrombosis of one of the paired anterior tibial veins, similar to prior.  The posterior tibial veins and peroneal veins are patent.   Impression:       1. Extension of known thrombus within the right lower extremity veins, now extending from the common femoral vein into the superficial femoral, popliteal, posterior tibial, and peroneal veins.  2. Partially occlusive thrombus within the left common femoral vein extending into the proximal superficial femoral vein, not present on the most recent prior ultrasound.  3. Persistent thrombosis of one of the paired left anterior tibial veins.  This report was flagged in Epic as  abnormal.    Electronically signed by resident: Adrián Becker  Date: 06/07/2019  Time: 02:54    Electronically signed by: Pancho Sheikh MD  Date: 06/07/2019  Time: 03:12   CT Abdomen Pelvis With Contrast [[id number]] (Abnormal) Resulted: 06/07/19 0131   Order Status: Completed Updated: 06/07/19 0133   Narrative:     EXAMINATION:  CTA CHEST NON CORONARY; CT ABDOMEN PELVIS WITH CONTRAST    CLINICAL HISTORY:  abrupt onset shortness of breath;; bacteremia;    TECHNIQUE:  The patient was surveyed from the lung apices through the pelvis after the administration of 100 cc Omni 350 IV contrast as well as oral contrast and data was reconstructed for coronal, sagittal, and axial images.  Contrast timing was optimized to evaluate the pulmonary arteries.  MIP images were performed.    COMPARISON:  Radiograph 06/06/2019, 06/02/2019, 05/29/2019; CTA 05/22/2019, 05/08/2019; CT 05/01/2019.    FINDINGS:  Examination of the vascular and soft tissue structures at the base of the neck demonstrate a stable 1.4 cm in hypodensity in the left thyroid lobe.  A right-sided central venous catheter is in place with tip terminating in the superior vena cava.    Left sided aortic arch.  The thoracic aorta maintains normal caliber, contour, and course with mild atherosclerotic calcification within its course.  The heart is not enlarged.  There is calcific atherosclerosis of the coronary arteries.  Minimal pericardial effusion, unchanged.    The pulmonary arteries distribute normally. This study is adequate for the evaluation of pulmonary thromboembolism. There is no filling defect of the pulmonary arteries to suggest pulmonary thromboembolism.    A tracheostomy cannula is in place.  The trachea is midline, the proximal airways are patent, and the lungs are symmetrically expanded.  Patchy ground-glass opacification is present throughout the lungs.  There are consolidative opacities of the lung bases bilaterally, left greater than right, overall  improved when compared to CTA 05/08/2019.  No pneumothorax.  There are moderate-sized pleural effusions bilaterally, slightly enlarged when compared to CTA 05/08/2019.    No axillary or mediastinal lymph node enlargement is seen.    The esophagus maintains a normal course and caliber.    The liver is enlarged measuring 22.1 cm.  No focal hepatic abnormality is seen.  The gallbladder is unremarkable.  No intrahepatic or extrahepatic biliary ductal dilatation is noted.  Portal vein, splenic vein, and SMV are patent.    There is a percutaneous gastrostomy tube in place.  The stomach, pancreas, and adrenal glands are unremarkable.  There are multiple hypoattenuating lesions throughout the spleen, similar to CTA 05/22/2019.    The kidneys are normal in size and location.  Percutaneous left nephrostomy tube remains in stable position within the left lower pole.  No hydronephrosis is seen.  There are stable left renal cysts and additional bilateral subcentimeter renal hypodensities likely representing additional cysts.  Postsurgical changes of left ureteral repair are again noted.  The right ureter appears normal in course and caliber without evidence of ureteral dilatation. The urinary bladder is decompressed around a Fontenot catheter.  The uterus is surgically absent.    No evidence of bowel inflammation or obstruction. No ascites, free fluid, or intraperitoneal free air is identified.    There is no evidence of lymph node enlargement in the abdomen or pelvis.  The abdominal aorta is normal in course and caliber with significant atherosclerotic calcifications.  An IVC filter is in place.    The osseus structures demonstrate postsurgical changes of L5-S1 posterior fusion with disc spacer in place at this level.  There are degenerative changes without evidence of acute fracture or osseus destructive process.    There is a healing midline abdominal incision with persistent fluid collection along the inferior aspect of the  incision measuring 3.6 x 8.2 x 2.5 cm demonstrating surrounding inflammatory changes.  There is anasarca.   Impression:       1. No pulmonary thromboembolism identified.  2. Patchy ground-glass opacification throughout the lungs compatible with pulmonary edema versus nonspecific infectious/inflammatory process.  3. Pulmonary consolidation overall improved when compared to CTA 05/08/2019 with persistent opacities the lung bases which may reflect atelectasis, aspiration, or pneumonia.  4. Moderate-sized pleural effusions bilaterally, mildly enlarged when compared to CTA 05/08/2019.  Minimal pericardial effusion, unchanged.  5. Left-sided percutaneous nephrostomy tube in stable position.  No hydronephrosis.  6. Persistent fluid collection within the anterior abdominal wall along the inferior aspect of the midline abdominal incision which is nonspecific and may represent hematoma, seroma, or abscess.  7. Stable hypoattenuating lesions throughout the spleen which are nonspecific and may represent hematoma, infarct, or less likely abscess.  8. Anasarca.  9. Interval placement of IVC filter.  10. Multiple additional findings as detailed above.  This report was flagged in Epic as abnormal.    Electronically signed by resident: Adrián Becker  Date: 06/07/2019  Time: 00:30    Electronically signed by: Pancho Sheikh MD  Date: 06/07/2019  Time: 01:31   CTA Chest Non-Coronary - PE Study [[id number]] (Abnormal) Resulted: 06/07/19 0131   Order Status: Completed Updated: 06/07/19 0133   Narrative:     EXAMINATION:  CTA CHEST NON CORONARY; CT ABDOMEN PELVIS WITH CONTRAST    CLINICAL HISTORY:  abrupt onset shortness of breath;; bacteremia;    TECHNIQUE:  The patient was surveyed from the lung apices through the pelvis after the administration of 100 cc Omni 350 IV contrast as well as oral contrast and data was reconstructed for coronal, sagittal, and axial images.  Contrast timing was optimized to evaluate the pulmonary arteries.   MIP images were performed.    COMPARISON:  Radiograph 06/06/2019, 06/02/2019, 05/29/2019; CTA 05/22/2019, 05/08/2019; CT 05/01/2019.    FINDINGS:  Examination of the vascular and soft tissue structures at the base of the neck demonstrate a stable 1.4 cm in hypodensity in the left thyroid lobe.  A right-sided central venous catheter is in place with tip terminating in the superior vena cava.    Left sided aortic arch.  The thoracic aorta maintains normal caliber, contour, and course with mild atherosclerotic calcification within its course.  The heart is not enlarged.  There is calcific atherosclerosis of the coronary arteries.  Minimal pericardial effusion, unchanged.    The pulmonary arteries distribute normally. This study is adequate for the evaluation of pulmonary thromboembolism. There is no filling defect of the pulmonary arteries to suggest pulmonary thromboembolism.    A tracheostomy cannula is in place.  The trachea is midline, the proximal airways are patent, and the lungs are symmetrically expanded.  Patchy ground-glass opacification is present throughout the lungs.  There are consolidative opacities of the lung bases bilaterally, left greater than right, overall improved when compared to CTA 05/08/2019.  No pneumothorax.  There are moderate-sized pleural effusions bilaterally, slightly enlarged when compared to CTA 05/08/2019.    No axillary or mediastinal lymph node enlargement is seen.    The esophagus maintains a normal course and caliber.    The liver is enlarged measuring 22.1 cm.  No focal hepatic abnormality is seen.  The gallbladder is unremarkable.  No intrahepatic or extrahepatic biliary ductal dilatation is noted.  Portal vein, splenic vein, and SMV are patent.    There is a percutaneous gastrostomy tube in place.  The stomach, pancreas, and adrenal glands are unremarkable.  There are multiple hypoattenuating lesions throughout the spleen, similar to CTA 05/22/2019.    The kidneys are normal  in size and location.  Percutaneous left nephrostomy tube remains in stable position within the left lower pole.  No hydronephrosis is seen.  There are stable left renal cysts and additional bilateral subcentimeter renal hypodensities likely representing additional cysts.  Postsurgical changes of left ureteral repair are again noted.  The right ureter appears normal in course and caliber without evidence of ureteral dilatation. The urinary bladder is decompressed around a Fontenot catheter.  The uterus is surgically absent.    No evidence of bowel inflammation or obstruction. No ascites, free fluid, or intraperitoneal free air is identified.    There is no evidence of lymph node enlargement in the abdomen or pelvis.  The abdominal aorta is normal in course and caliber with significant atherosclerotic calcifications.  An IVC filter is in place.    The osseus structures demonstrate postsurgical changes of L5-S1 posterior fusion with disc spacer in place at this level.  There are degenerative changes without evidence of acute fracture or osseus destructive process.    There is a healing midline abdominal incision with persistent fluid collection along the inferior aspect of the incision measuring 3.6 x 8.2 x 2.5 cm demonstrating surrounding inflammatory changes.  There is anasarca.   Impression:       1. No pulmonary thromboembolism identified.  2. Patchy ground-glass opacification throughout the lungs compatible with pulmonary edema versus nonspecific infectious/inflammatory process.  3. Pulmonary consolidation overall improved when compared to CTA 05/08/2019 with persistent opacities the lung bases which may reflect atelectasis, aspiration, or pneumonia.  4. Moderate-sized pleural effusions bilaterally, mildly enlarged when compared to CTA 05/08/2019.  Minimal pericardial effusion, unchanged.  5. Left-sided percutaneous nephrostomy tube in stable position.  No hydronephrosis.  6. Persistent fluid collection within the  anterior abdominal wall along the inferior aspect of the midline abdominal incision which is nonspecific and may represent hematoma, seroma, or abscess.  7. Stable hypoattenuating lesions throughout the spleen which are nonspecific and may represent hematoma, infarct, or less likely abscess.  8. Anasarca.  9. Interval placement of IVC filter.  10. Multiple additional findings as detailed above.  This report was flagged in Epic as abnormal.    Electronically signed by resident: Adrián Becker  Date: 06/07/2019  Time: 00:30    Electronically signed by: Pancho Sheikh MD  Date: 06/07/2019  Time: 01:31   X-Ray Abdomen AP 1 View [[id number]] Resulted: 06/06/19 2224   Order Status: Completed Updated: 06/06/19 2226   Narrative:     EXAMINATION:  XR ABDOMEN AP 1 VIEW    CLINICAL HISTORY:  evaluate nephrostomy tube placement;    TECHNIQUE:  AP View(s) of the abdomen was performed.    COMPARISON:  None    FINDINGS:  Two supine views the abdomen presented.  There is a pigtail catheter overlying the left abdomen consistent with percutaneous nephrostomy.    There is L5-S1 spinal fusion hardware.  There appears to be a gastrostomy tube projecting in left upper quadrant.  The bowel gas pattern appears normal.  No pneumatosis or free air.  There is mild reticular opacities at the lung bases.  There is an IVC filter noted.  No fracture or calculus found.   Impression:       Normal bowel gas pattern.      Electronically signed by: Chad Jewell  Date: 06/06/2019  Time: 22:24   X-Ray Chest AP Portable [[id number]] Resulted: 06/06/19 1418   Order Status: Completed Updated: 06/06/19 1420   Narrative:     EXAMINATION:  XR CHEST AP PORTABLE    CLINICAL HISTORY:  Sepsis;    TECHNIQUE:  Single frontal view of the chest was performed.    COMPARISON:  Non 06/06/2019 e    FINDINGS:  Support devices remain.  Ill-defined patchy airspace consolidation and/or edema bilaterally identified without significant changes.  No significant pleural effusion.    Impression:       See above      Electronically signed by: Christian Valencia MD  Date: 06/06/2019

## 2019-06-11 LAB
ABO + RH BLD: NORMAL
ALBUMIN SERPL BCP-MCNC: 1.8 G/DL (ref 3.5–5.2)
ALP SERPL-CCNC: 128 U/L (ref 55–135)
ALT SERPL W/O P-5'-P-CCNC: 7 U/L (ref 10–44)
ANION GAP SERPL CALC-SCNC: 11 MMOL/L (ref 8–16)
APPEARANCE FLD: NORMAL
AST SERPL-CCNC: 18 U/L (ref 10–40)
BACTERIA BLD CULT: NORMAL
BACTERIA BLD CULT: NORMAL
BACTERIA SPEC AEROBE CULT: NORMAL
BACTERIA SPEC AEROBE CULT: NORMAL
BASOPHILS # BLD AUTO: 0.06 K/UL (ref 0–0.2)
BASOPHILS NFR BLD: 0.7 % (ref 0–1.9)
BILIRUB SERPL-MCNC: 0.5 MG/DL (ref 0.1–1)
BLD GP AB SCN CELLS X3 SERPL QL: NORMAL
BODY FLD TYPE: NORMAL
BUN SERPL-MCNC: 42 MG/DL (ref 6–20)
CALCIUM SERPL-MCNC: 9.2 MG/DL (ref 8.7–10.5)
CHLORIDE SERPL-SCNC: 109 MMOL/L (ref 95–110)
CO2 SERPL-SCNC: 28 MMOL/L (ref 23–29)
COLOR FLD: NORMAL
CREAT SERPL-MCNC: 0.9 MG/DL (ref 0.5–1.4)
DIFFERENTIAL METHOD: ABNORMAL
EOSINOPHIL # BLD AUTO: 0.7 K/UL (ref 0–0.5)
EOSINOPHIL NFR BLD: 8.2 % (ref 0–8)
ERYTHROCYTE [DISTWIDTH] IN BLOOD BY AUTOMATED COUNT: 15.4 % (ref 11.5–14.5)
EST. GFR  (AFRICAN AMERICAN): >60 ML/MIN/1.73 M^2
EST. GFR  (NON AFRICAN AMERICAN): >60 ML/MIN/1.73 M^2
GLUCOSE SERPL-MCNC: 169 MG/DL (ref 70–110)
GRAM STN SPEC: NORMAL
HCT VFR BLD AUTO: 30.8 % (ref 37–48.5)
HGB BLD-MCNC: 9.3 G/DL (ref 12–16)
IMM GRANULOCYTES # BLD AUTO: 0.04 K/UL (ref 0–0.04)
IMM GRANULOCYTES NFR BLD AUTO: 0.5 % (ref 0–0.5)
LYMPHOCYTES # BLD AUTO: 2.1 K/UL (ref 1–4.8)
LYMPHOCYTES NFR BLD: 25.3 % (ref 18–48)
LYMPHOCYTES NFR FLD MANUAL: 12 %
MAGNESIUM SERPL-MCNC: 1.9 MG/DL (ref 1.6–2.6)
MAGNESIUM SERPL-MCNC: 2.1 MG/DL (ref 1.6–2.6)
MCH RBC QN AUTO: 27.5 PG (ref 27–31)
MCHC RBC AUTO-ENTMCNC: 30.2 G/DL (ref 32–36)
MCV RBC AUTO: 91 FL (ref 82–98)
MONOCYTES # BLD AUTO: 0.8 K/UL (ref 0.3–1)
MONOCYTES NFR BLD: 10.1 % (ref 4–15)
MONOS+MACROS NFR FLD MANUAL: 8 %
NEUTROPHILS # BLD AUTO: 4.5 K/UL (ref 1.8–7.7)
NEUTROPHILS NFR BLD: 55.2 % (ref 38–73)
NEUTROPHILS NFR FLD MANUAL: 80 %
NRBC BLD-RTO: 0 /100 WBC
PHOSPHATE SERPL-MCNC: 2.3 MG/DL (ref 2.7–4.5)
PHOSPHATE SERPL-MCNC: 2.9 MG/DL (ref 2.7–4.5)
PLATELET # BLD AUTO: 287 K/UL (ref 150–350)
PMV BLD AUTO: 11.1 FL (ref 9.2–12.9)
POCT GLUCOSE: 145 MG/DL (ref 70–110)
POCT GLUCOSE: 145 MG/DL (ref 70–110)
POCT GLUCOSE: 184 MG/DL (ref 70–110)
POCT GLUCOSE: 195 MG/DL (ref 70–110)
POCT GLUCOSE: 224 MG/DL (ref 70–110)
POTASSIUM SERPL-SCNC: 3.5 MMOL/L (ref 3.5–5.1)
POTASSIUM SERPL-SCNC: 3.9 MMOL/L (ref 3.5–5.1)
PROT SERPL-MCNC: 7.4 G/DL (ref 6–8.4)
RBC # BLD AUTO: 3.38 M/UL (ref 4–5.4)
SODIUM SERPL-SCNC: 148 MMOL/L (ref 136–145)
WBC # BLD AUTO: 8.13 K/UL (ref 3.9–12.7)
WBC # FLD: NORMAL /CU MM

## 2019-06-11 PROCEDURE — 89051 BODY FLUID CELL COUNT: CPT

## 2019-06-11 PROCEDURE — 86850 RBC ANTIBODY SCREEN: CPT

## 2019-06-11 PROCEDURE — 27200966 HC CLOSED SUCTION SYSTEM

## 2019-06-11 PROCEDURE — 25000242 PHARM REV CODE 250 ALT 637 W/ HCPCS: Performed by: NURSE PRACTITIONER

## 2019-06-11 PROCEDURE — 99900026 HC AIRWAY MAINTENANCE (STAT)

## 2019-06-11 PROCEDURE — 63600175 PHARM REV CODE 636 W HCPCS: Performed by: STUDENT IN AN ORGANIZED HEALTH CARE EDUCATION/TRAINING PROGRAM

## 2019-06-11 PROCEDURE — 27000221 HC OXYGEN, UP TO 24 HOURS

## 2019-06-11 PROCEDURE — 99233 PR SUBSEQUENT HOSPITAL CARE,LEVL III: ICD-10-PCS | Mod: ,,, | Performed by: NURSE PRACTITIONER

## 2019-06-11 PROCEDURE — 87116 MYCOBACTERIA CULTURE: CPT

## 2019-06-11 PROCEDURE — 25000003 PHARM REV CODE 250: Performed by: NURSE PRACTITIONER

## 2019-06-11 PROCEDURE — 25000003 PHARM REV CODE 250: Performed by: GENERAL ACUTE CARE HOSPITAL

## 2019-06-11 PROCEDURE — 99233 PR SUBSEQUENT HOSPITAL CARE,LEVL III: ICD-10-PCS | Mod: ,,, | Performed by: PHYSICIAN ASSISTANT

## 2019-06-11 PROCEDURE — 63600175 PHARM REV CODE 636 W HCPCS: Performed by: NURSE PRACTITIONER

## 2019-06-11 PROCEDURE — 99900035 HC TECH TIME PER 15 MIN (STAT)

## 2019-06-11 PROCEDURE — 99233 SBSQ HOSP IP/OBS HIGH 50: CPT | Mod: ,,, | Performed by: PHYSICIAN ASSISTANT

## 2019-06-11 PROCEDURE — 94003 VENT MGMT INPAT SUBQ DAY: CPT

## 2019-06-11 PROCEDURE — 87075 CULTR BACTERIA EXCEPT BLOOD: CPT

## 2019-06-11 PROCEDURE — 84100 ASSAY OF PHOSPHORUS: CPT

## 2019-06-11 PROCEDURE — 87205 SMEAR GRAM STAIN: CPT

## 2019-06-11 PROCEDURE — 94761 N-INVAS EAR/PLS OXIMETRY MLT: CPT

## 2019-06-11 PROCEDURE — 63600175 PHARM REV CODE 636 W HCPCS: Performed by: GENERAL ACUTE CARE HOSPITAL

## 2019-06-11 PROCEDURE — 87070 CULTURE OTHR SPECIMN AEROBIC: CPT

## 2019-06-11 PROCEDURE — 63600175 PHARM REV CODE 636 W HCPCS: Performed by: PHYSICIAN ASSISTANT

## 2019-06-11 PROCEDURE — 63600175 PHARM REV CODE 636 W HCPCS: Mod: JG | Performed by: PHYSICIAN ASSISTANT

## 2019-06-11 PROCEDURE — 94640 AIRWAY INHALATION TREATMENT: CPT

## 2019-06-11 PROCEDURE — 87102 FUNGUS ISOLATION CULTURE: CPT

## 2019-06-11 PROCEDURE — 84132 ASSAY OF SERUM POTASSIUM: CPT

## 2019-06-11 PROCEDURE — 25000003 PHARM REV CODE 250: Performed by: PHYSICIAN ASSISTANT

## 2019-06-11 PROCEDURE — 83735 ASSAY OF MAGNESIUM: CPT | Mod: 91

## 2019-06-11 PROCEDURE — 20000000 HC ICU ROOM

## 2019-06-11 PROCEDURE — 87206 SMEAR FLUORESCENT/ACID STAI: CPT

## 2019-06-11 PROCEDURE — 80053 COMPREHEN METABOLIC PANEL: CPT

## 2019-06-11 PROCEDURE — 63700000 PHARM REV CODE 250 ALT 637 W/O HCPCS: Performed by: NURSE PRACTITIONER

## 2019-06-11 PROCEDURE — 25000003 PHARM REV CODE 250: Performed by: INTERNAL MEDICINE

## 2019-06-11 PROCEDURE — 85025 COMPLETE CBC W/AUTO DIFF WBC: CPT

## 2019-06-11 PROCEDURE — 83735 ASSAY OF MAGNESIUM: CPT

## 2019-06-11 PROCEDURE — A4216 STERILE WATER/SALINE, 10 ML: HCPCS | Performed by: NURSE PRACTITIONER

## 2019-06-11 PROCEDURE — 99233 SBSQ HOSP IP/OBS HIGH 50: CPT | Mod: ,,, | Performed by: NURSE PRACTITIONER

## 2019-06-11 PROCEDURE — 84100 ASSAY OF PHOSPHORUS: CPT | Mod: 91

## 2019-06-11 RX ORDER — MEROPENEM AND SODIUM CHLORIDE 1 G/50ML
1 INJECTION, SOLUTION INTRAVENOUS
Status: CANCELLED | OUTPATIENT
Start: 2019-06-11 | End: 2019-06-14

## 2019-06-11 RX ORDER — CEFADROXIL 500 MG/1
500 CAPSULE ORAL EVERY 12 HOURS
Status: CANCELLED | OUTPATIENT
Start: 2019-06-14

## 2019-06-11 RX ORDER — FAMOTIDINE 20 MG/1
20 TABLET, FILM COATED ORAL 2 TIMES DAILY
Status: DISCONTINUED | OUTPATIENT
Start: 2019-06-11 | End: 2019-06-12 | Stop reason: HOSPADM

## 2019-06-11 RX ADMIN — Medication 3 ML: at 02:06

## 2019-06-11 RX ADMIN — INSULIN ASPART 2 UNITS: 100 INJECTION, SOLUTION INTRAVENOUS; SUBCUTANEOUS at 06:06

## 2019-06-11 RX ADMIN — INSULIN ASPART 4 UNITS: 100 INJECTION, SOLUTION INTRAVENOUS; SUBCUTANEOUS at 11:06

## 2019-06-11 RX ADMIN — ONDANSETRON 4 MG: 2 INJECTION INTRAMUSCULAR; INTRAVENOUS at 11:06

## 2019-06-11 RX ADMIN — Medication 3 ML: at 05:06

## 2019-06-11 RX ADMIN — HEPARIN SODIUM 5000 UNITS: 5000 INJECTION, SOLUTION INTRAVENOUS; SUBCUTANEOUS at 02:06

## 2019-06-11 RX ADMIN — FAMOTIDINE 20 MG: 20 TABLET, FILM COATED ORAL at 09:06

## 2019-06-11 RX ADMIN — ASPIRIN 81 MG CHEWABLE TABLET 81 MG: 81 TABLET CHEWABLE at 09:06

## 2019-06-11 RX ADMIN — LOSARTAN POTASSIUM 25 MG: 25 TABLET, FILM COATED ORAL at 09:06

## 2019-06-11 RX ADMIN — IPRATROPIUM BROMIDE AND ALBUTEROL SULFATE 3 ML: .5; 3 SOLUTION RESPIRATORY (INHALATION) at 07:06

## 2019-06-11 RX ADMIN — ATORVASTATIN CALCIUM 80 MG: 20 TABLET, FILM COATED ORAL at 09:06

## 2019-06-11 RX ADMIN — FUROSEMIDE 60 MG: 10 INJECTION, SOLUTION INTRAVENOUS at 09:06

## 2019-06-11 RX ADMIN — HEPARIN SODIUM 5000 UNITS: 5000 INJECTION, SOLUTION INTRAVENOUS; SUBCUTANEOUS at 10:06

## 2019-06-11 RX ADMIN — Medication 25 MG: at 10:06

## 2019-06-11 RX ADMIN — Medication 3 ML: at 10:06

## 2019-06-11 RX ADMIN — IPRATROPIUM BROMIDE AND ALBUTEROL SULFATE 3 ML: .5; 3 SOLUTION RESPIRATORY (INHALATION) at 11:06

## 2019-06-11 RX ADMIN — MEROPENEM 2 G: 1 INJECTION, POWDER, FOR SOLUTION INTRAVENOUS at 11:06

## 2019-06-11 RX ADMIN — ONDANSETRON 4 MG: 2 INJECTION INTRAMUSCULAR; INTRAVENOUS at 06:06

## 2019-06-11 RX ADMIN — FAMOTIDINE 20 MG: 20 TABLET, FILM COATED ORAL at 10:06

## 2019-06-11 RX ADMIN — SODIUM CHLORIDE 300 MG: 9 INJECTION, SOLUTION INTRAVENOUS at 09:06

## 2019-06-11 RX ADMIN — POTASSIUM CHLORIDE 40 MEQ: 20 SOLUTION ORAL at 05:06

## 2019-06-11 RX ADMIN — CHLORHEXIDINE GLUCONATE 0.12% ORAL RINSE 15 ML: 1.2 LIQUID ORAL at 09:06

## 2019-06-11 RX ADMIN — Medication 300 MG: at 11:06

## 2019-06-11 RX ADMIN — ALTEPLASE 2 MG: 2.2 INJECTION, POWDER, LYOPHILIZED, FOR SOLUTION INTRAVENOUS at 07:06

## 2019-06-11 RX ADMIN — HEPARIN SODIUM 5000 UNITS: 5000 INJECTION, SOLUTION INTRAVENOUS; SUBCUTANEOUS at 05:06

## 2019-06-11 RX ADMIN — MEROPENEM 2 G: 1 INJECTION, POWDER, FOR SOLUTION INTRAVENOUS at 05:06

## 2019-06-11 RX ADMIN — FENTANYL CITRATE 50 MCG: 50 INJECTION INTRAMUSCULAR; INTRAVENOUS at 07:06

## 2019-06-11 RX ADMIN — CHLORHEXIDINE GLUCONATE 0.12% ORAL RINSE 15 ML: 1.2 LIQUID ORAL at 10:06

## 2019-06-11 RX ADMIN — FUROSEMIDE 60 MG: 10 INJECTION, SOLUTION INTRAVENOUS at 02:06

## 2019-06-11 RX ADMIN — MORPHINE SULFATE 2 MG: 2 INJECTION, SOLUTION INTRAMUSCULAR; INTRAVENOUS at 11:06

## 2019-06-11 RX ADMIN — POTASSIUM & SODIUM PHOSPHATES POWDER PACK 280-160-250 MG 2 PACKET: 280-160-250 PACK at 05:06

## 2019-06-11 RX ADMIN — FENTANYL CITRATE 50 MCG: 50 INJECTION INTRAMUSCULAR; INTRAVENOUS at 05:06

## 2019-06-11 RX ADMIN — Medication 25 MG: at 09:06

## 2019-06-11 RX ADMIN — MEROPENEM AND SODIUM CHLORIDE 1 G: 1 INJECTION, SOLUTION INTRAVENOUS at 01:06

## 2019-06-11 NOTE — ASSESSMENT & PLAN NOTE
--suspect secondary to prolonged hospitalization with critical illness and recent rectal bleeding.  --hgb 6.4 on 06/07 Transfused 2 units of PRBCs.   --No current evidence of ongoing bleeding.

## 2019-06-11 NOTE — SUBJECTIVE & OBJECTIVE
Interval History: No AEON.  Afebrile and WBC WNL. Resp cx shows pseudomonas sensitive to meropenem.  Resp therapy says patients sputum thin and clear  Blood cultures NGTD.  Having 3 mushy BMs daily.  The patient denies any recent abd pain, SOB, fever, chills, or sweats.      Review of Systems   Constitutional: Negative for activity change, chills, diaphoresis and fever.   Respiratory: Negative for cough, shortness of breath and wheezing.         + increased sputum - now improved     Cardiovascular: Negative for chest pain.   Gastrointestinal: Negative for abdominal pain, constipation, diarrhea, nausea and vomiting.   Genitourinary: Negative for dysuria, frequency and urgency.   Neurological: Negative for dizziness.   Hematological: Does not bruise/bleed easily.     Objective:     Vital Signs (Most Recent):  Temp: 98.7 °F (37.1 °C) (06/11/19 1643)  Pulse: 80 (06/11/19 1830)  Resp: 18 (06/11/19 1830)  BP: (!) 155/72 (06/11/19 1830)  SpO2: 100 % (06/11/19 1830) Vital Signs (24h Range):  Temp:  [98.5 °F (36.9 °C)-99 °F (37.2 °C)] 98.7 °F (37.1 °C)  Pulse:  [63-91] 80  Resp:  [12-41] 18  SpO2:  [78 %-100 %] 100 %  BP: ()/(55-85) 155/72     Weight: 69.9 kg (154 lb)  Body mass index is 25.63 kg/m².    Estimated Creatinine Clearance: 66.9 mL/min (based on SCr of 0.9 mg/dL).    Physical Exam   Constitutional: She is oriented to person, place, and time. She appears well-developed and well-nourished. No distress (appears comfortable).   HENT:   Head: Normocephalic and atraumatic.   Tracheostomy in place - no drainage/erythema noted   Eyes: Conjunctivae are normal. Right eye exhibits no discharge. Left eye exhibits no discharge. No scleral icterus.   Cardiovascular: Normal rate and regular rhythm.   Pulmonary/Chest: No stridor. No tachypnea. No respiratory distress. She has no wheezes. She has no rhonchi. She has rales (bases).   Crackles at bases     Abdominal: Soft. She exhibits no distension and no mass. There is no  tenderness. There is no guarding.   Midline surgical  incision healed - c/d/i.   PEG site clear   Genitourinary:   Genitourinary Comments: Nephrostomy tube in place, leaking around insertion site.  No purulence noted.      Fontenot to gravity - urine orange tinged   Musculoskeletal: Normal range of motion. She exhibits no edema or tenderness.   Neurological: She is alert and oriented to person, place, and time.   Skin: Skin is warm and dry. No rash noted. She is not diaphoretic.   PICC RUE - site clear, non-tender   Psychiatric: She has a normal mood and affect. Her behavior is normal.   Nursing note and vitals reviewed.      Significant Labs:   Blood Culture:   Recent Labs   Lab 05/20/19  1306 06/01/19  1712 06/01/19  1713 06/06/19  1333 06/06/19  1346   LABBLOO No growth after 5 days. No growth after 5 days. No growth after 5 days. No growth after 5 days. No growth after 5 days.     CBC:   Recent Labs   Lab 06/10/19  0453 06/11/19  0355   WBC 8.64 8.13   HGB 8.7* 9.3*   HCT 29.3* 30.8*    287     CMP:   Recent Labs   Lab 06/10/19  0453 06/11/19  0355   * 148*   K 4.2 3.5   * 109   CO2 28 28   * 169*   BUN 39* 42*   CREATININE 1.3 0.9   CALCIUM 8.9 9.2   PROT 7.3 7.4   ALBUMIN 1.8* 1.8*   BILITOT 0.5 0.5   ALKPHOS 134 128   AST 24 18   ALT 10 7*   ANIONGAP 10 11   EGFRNONAA 45.3* >60.0     Respiratory Culture:   Recent Labs   Lab 04/30/19  0900 05/08/19  1300 05/19/19  1130 06/06/19  2012   GSRESP <10 epithelial cells per low power field.  No WBC's  No organisms seen Rare WBC's  No organisms seen Few WBC's  Few Gram negative rods <10 epithelial cells per low power field.  Few WBC's   No organisms seen   RESPIRATORYC  --  No growth PSEUDOMONAS AERUGINOSA  Many   No S aureus isolated.  PSEUDOMONAS AERUGINOSA  Few       All pertinent labs within the past 24 hours have been reviewed.    Significant Imaging: I have reviewed all pertinent imaging results/findings within the past 24 hours.    X-Ray Chest AP Portable [[id number]] Resulted: 06/10/19 0910   Order Status: Completed Updated: 06/10/19 0912   Narrative:     EXAMINATION:  XR CHEST AP PORTABLE    CLINICAL HISTORY:  pna;    TECHNIQUE:  Single frontal portable view of the chest was performed.    COMPARISON:  06/06/2019    FINDINGS:  Tracheostomy cannula remains in place with its tip in satisfactory position.  Right-sided PICC again seen with its tip at the cavoatrial junction.  Cardiomegaly, unchanged.  Continued pulmonary vascular congestion and interstitial/alveolar edema.  There may have been a little improvement in aeration at the right lower lung zone.  No pneumothorax or other detrimental change.   Impression:       As above      Electronically signed by: Denzel Doherty MD  Date: 06/10/2019  Time: 09:10   US Upper Extremity Veins Right [[id number]] (Abnormal) Resulted: 06/07/19 0317   Order Status: Completed Updated: 06/07/19 0319   Narrative:     EXAMINATION:  US UPPER EXTREMITY VEINS LEFT; US UPPER EXTREMITY VEINS RIGHT    CLINICAL HISTORY:  swelling;; pain/swelling;    TECHNIQUE:  Duplex and color flow Doppler evaluation and dynamic compression was performed of the bilateral upper extremity veins.    COMPARISON:  Ultrasound 05/12/2019    FINDINGS:  Right central veins: Central venous catheter identified within the axillary vein.  The internal jugular, subclavian, and axillary veins are patent and free of thrombus.    Right arm veins: Central venous catheter identified within the basilic vein.  There is thrombosis of the cephalic vein.  The brachial, and basilic veins are patent and compressible.    Left central veins: Persistent occlusive thrombus within the axillary vein.  The internal jugular and subclavian veins are patent and free of thrombus.    Left arm veins: Persistent occlusive thrombus within the basilic vein.  The brachial and cephalic veins are patent and compressible.   Impression:       1. Persistent occlusive deep vein  thrombosis of the left axillary and basilic veins.  2. Superficial thrombophlebitis of the right cephalic vein.  3. Central venous catheter identified extending from the right basilic vein into the right axillary vein.  This report was flagged in Epic as abnormal.    Electronically signed by resident: Adrián Becker  Date: 06/07/2019  Time: 03:03    Electronically signed by: Pancho Sheikh MD  Date: 06/07/2019  Time: 03:17   US Upper Extremity Veins Left [[id number]] (Abnormal) Resulted: 06/07/19 0317   Order Status: Completed Updated: 06/07/19 0319   Narrative:     EXAMINATION:  US UPPER EXTREMITY VEINS LEFT; US UPPER EXTREMITY VEINS RIGHT    CLINICAL HISTORY:  swelling;; pain/swelling;    TECHNIQUE:  Duplex and color flow Doppler evaluation and dynamic compression was performed of the bilateral upper extremity veins.    COMPARISON:  Ultrasound 05/12/2019    FINDINGS:  Right central veins: Central venous catheter identified within the axillary vein.  The internal jugular, subclavian, and axillary veins are patent and free of thrombus.    Right arm veins: Central venous catheter identified within the basilic vein.  There is thrombosis of the cephalic vein.  The brachial, and basilic veins are patent and compressible.    Left central veins: Persistent occlusive thrombus within the axillary vein.  The internal jugular and subclavian veins are patent and free of thrombus.    Left arm veins: Persistent occlusive thrombus within the basilic vein.  The brachial and cephalic veins are patent and compressible.   Impression:       1. Persistent occlusive deep vein thrombosis of the left axillary and basilic veins.  2. Superficial thrombophlebitis of the right cephalic vein.  3. Central venous catheter identified extending from the right basilic vein into the right axillary vein.  This report was flagged in Epic as abnormal.    Electronically signed by resident: Adrián Becker  Date: 06/07/2019  Time: 03:03    Electronically  signed by: Pancho Sheikh MD  Date: 06/07/2019  Time: 03:17   US Lower Extremity Veins Bilateral [[id number]] (Abnormal) Resulted: 06/07/19 0312   Order Status: Completed Updated: 06/07/19 0314   Narrative:     EXAMINATION:  US LOWER EXTREMITY VEINS BILATERAL    CLINICAL HISTORY:  pain/sweling;    TECHNIQUE:  Duplex and color flow Doppler and dynamic compression was performed of the bilateral lower extremity veins was performed.    COMPARISON:  Ultrasound 05/29/2019, 05/08/2019    FINDINGS:  Right thigh veins: Partially occlusive thrombus within the common femoral vein with completely occlusive thrombus within the superficial femoral vein extending into the popliteal vein.  The deep femoral, and upper greater saphenous veins are patent and free of thrombus.    Right calf veins: There is occlusive thrombus within the posterior tibial veins and peroneal veins.  The anterior tibial veins are patent.    Left thigh veins: Partially occlusive thrombus within the common femoral vein near the femoral saphenous junction extending into the proximal superficial femoral vein.  The popliteal, upper greater saphenous, and deep femoral veins are patent and free of thrombus.    Left calf veins: There is thrombosis of one of the paired anterior tibial veins, similar to prior.  The posterior tibial veins and peroneal veins are patent.   Impression:       1. Extension of known thrombus within the right lower extremity veins, now extending from the common femoral vein into the superficial femoral, popliteal, posterior tibial, and peroneal veins.  2. Partially occlusive thrombus within the left common femoral vein extending into the proximal superficial femoral vein, not present on the most recent prior ultrasound.  3. Persistent thrombosis of one of the paired left anterior tibial veins.  This report was flagged in Epic as abnormal.    Electronically signed by resident: Adrián Becker  Date: 06/07/2019  Time: 02:54    Electronically  signed by: Pancho Sheikh MD  Date: 06/07/2019  Time: 03:12   CT Abdomen Pelvis With Contrast [[id number]] (Abnormal) Resulted: 06/07/19 0131   Order Status: Completed Updated: 06/07/19 0133   Narrative:     EXAMINATION:  CTA CHEST NON CORONARY; CT ABDOMEN PELVIS WITH CONTRAST    CLINICAL HISTORY:  abrupt onset shortness of breath;; bacteremia;    TECHNIQUE:  The patient was surveyed from the lung apices through the pelvis after the administration of 100 cc Omni 350 IV contrast as well as oral contrast and data was reconstructed for coronal, sagittal, and axial images.  Contrast timing was optimized to evaluate the pulmonary arteries.  MIP images were performed.    COMPARISON:  Radiograph 06/06/2019, 06/02/2019, 05/29/2019; CTA 05/22/2019, 05/08/2019; CT 05/01/2019.    FINDINGS:  Examination of the vascular and soft tissue structures at the base of the neck demonstrate a stable 1.4 cm in hypodensity in the left thyroid lobe.  A right-sided central venous catheter is in place with tip terminating in the superior vena cava.    Left sided aortic arch.  The thoracic aorta maintains normal caliber, contour, and course with mild atherosclerotic calcification within its course.  The heart is not enlarged.  There is calcific atherosclerosis of the coronary arteries.  Minimal pericardial effusion, unchanged.    The pulmonary arteries distribute normally. This study is adequate for the evaluation of pulmonary thromboembolism. There is no filling defect of the pulmonary arteries to suggest pulmonary thromboembolism.    A tracheostomy cannula is in place.  The trachea is midline, the proximal airways are patent, and the lungs are symmetrically expanded.  Patchy ground-glass opacification is present throughout the lungs.  There are consolidative opacities of the lung bases bilaterally, left greater than right, overall improved when compared to CTA 05/08/2019.  No pneumothorax.  There are moderate-sized pleural effusions  bilaterally, slightly enlarged when compared to CTA 05/08/2019.    No axillary or mediastinal lymph node enlargement is seen.    The esophagus maintains a normal course and caliber.    The liver is enlarged measuring 22.1 cm.  No focal hepatic abnormality is seen.  The gallbladder is unremarkable.  No intrahepatic or extrahepatic biliary ductal dilatation is noted.  Portal vein, splenic vein, and SMV are patent.    There is a percutaneous gastrostomy tube in place.  The stomach, pancreas, and adrenal glands are unremarkable.  There are multiple hypoattenuating lesions throughout the spleen, similar to CTA 05/22/2019.    The kidneys are normal in size and location.  Percutaneous left nephrostomy tube remains in stable position within the left lower pole.  No hydronephrosis is seen.  There are stable left renal cysts and additional bilateral subcentimeter renal hypodensities likely representing additional cysts.  Postsurgical changes of left ureteral repair are again noted.  The right ureter appears normal in course and caliber without evidence of ureteral dilatation. The urinary bladder is decompressed around a Fontenot catheter.  The uterus is surgically absent.    No evidence of bowel inflammation or obstruction. No ascites, free fluid, or intraperitoneal free air is identified.    There is no evidence of lymph node enlargement in the abdomen or pelvis.  The abdominal aorta is normal in course and caliber with significant atherosclerotic calcifications.  An IVC filter is in place.    The osseus structures demonstrate postsurgical changes of L5-S1 posterior fusion with disc spacer in place at this level.  There are degenerative changes without evidence of acute fracture or osseus destructive process.    There is a healing midline abdominal incision with persistent fluid collection along the inferior aspect of the incision measuring 3.6 x 8.2 x 2.5 cm demonstrating surrounding inflammatory changes.  There is anasarca.    Impression:       1. No pulmonary thromboembolism identified.  2. Patchy ground-glass opacification throughout the lungs compatible with pulmonary edema versus nonspecific infectious/inflammatory process.  3. Pulmonary consolidation overall improved when compared to CTA 05/08/2019 with persistent opacities the lung bases which may reflect atelectasis, aspiration, or pneumonia.  4. Moderate-sized pleural effusions bilaterally, mildly enlarged when compared to CTA 05/08/2019.  Minimal pericardial effusion, unchanged.  5. Left-sided percutaneous nephrostomy tube in stable position.  No hydronephrosis.  6. Persistent fluid collection within the anterior abdominal wall along the inferior aspect of the midline abdominal incision which is nonspecific and may represent hematoma, seroma, or abscess.  7. Stable hypoattenuating lesions throughout the spleen which are nonspecific and may represent hematoma, infarct, or less likely abscess.  8. Anasarca.  9. Interval placement of IVC filter.  10. Multiple additional findings as detailed above.  This report was flagged in Epic as abnormal.    Electronically signed by resident: Adrián Becker  Date: 06/07/2019  Time: 00:30    Electronically signed by: Pancho Sheikh MD  Date: 06/07/2019  Time: 01:31   CTA Chest Non-Coronary - PE Study [[id number]] (Abnormal) Resulted: 06/07/19 0131   Order Status: Completed Updated: 06/07/19 0133   Narrative:     EXAMINATION:  CTA CHEST NON CORONARY; CT ABDOMEN PELVIS WITH CONTRAST    CLINICAL HISTORY:  abrupt onset shortness of breath;; bacteremia;    TECHNIQUE:  The patient was surveyed from the lung apices through the pelvis after the administration of 100 cc Omni 350 IV contrast as well as oral contrast and data was reconstructed for coronal, sagittal, and axial images.  Contrast timing was optimized to evaluate the pulmonary arteries.  MIP images were performed.    COMPARISON:  Radiograph 06/06/2019, 06/02/2019, 05/29/2019; CTA 05/22/2019,  05/08/2019; CT 05/01/2019.    FINDINGS:  Examination of the vascular and soft tissue structures at the base of the neck demonstrate a stable 1.4 cm in hypodensity in the left thyroid lobe.  A right-sided central venous catheter is in place with tip terminating in the superior vena cava.    Left sided aortic arch.  The thoracic aorta maintains normal caliber, contour, and course with mild atherosclerotic calcification within its course.  The heart is not enlarged.  There is calcific atherosclerosis of the coronary arteries.  Minimal pericardial effusion, unchanged.    The pulmonary arteries distribute normally. This study is adequate for the evaluation of pulmonary thromboembolism. There is no filling defect of the pulmonary arteries to suggest pulmonary thromboembolism.    A tracheostomy cannula is in place.  The trachea is midline, the proximal airways are patent, and the lungs are symmetrically expanded.  Patchy ground-glass opacification is present throughout the lungs.  There are consolidative opacities of the lung bases bilaterally, left greater than right, overall improved when compared to CTA 05/08/2019.  No pneumothorax.  There are moderate-sized pleural effusions bilaterally, slightly enlarged when compared to CTA 05/08/2019.    No axillary or mediastinal lymph node enlargement is seen.    The esophagus maintains a normal course and caliber.    The liver is enlarged measuring 22.1 cm.  No focal hepatic abnormality is seen.  The gallbladder is unremarkable.  No intrahepatic or extrahepatic biliary ductal dilatation is noted.  Portal vein, splenic vein, and SMV are patent.    There is a percutaneous gastrostomy tube in place.  The stomach, pancreas, and adrenal glands are unremarkable.  There are multiple hypoattenuating lesions throughout the spleen, similar to CTA 05/22/2019.    The kidneys are normal in size and location.  Percutaneous left nephrostomy tube remains in stable position within the left lower  pole.  No hydronephrosis is seen.  There are stable left renal cysts and additional bilateral subcentimeter renal hypodensities likely representing additional cysts.  Postsurgical changes of left ureteral repair are again noted.  The right ureter appears normal in course and caliber without evidence of ureteral dilatation. The urinary bladder is decompressed around a Fontenot catheter.  The uterus is surgically absent.    No evidence of bowel inflammation or obstruction. No ascites, free fluid, or intraperitoneal free air is identified.    There is no evidence of lymph node enlargement in the abdomen or pelvis.  The abdominal aorta is normal in course and caliber with significant atherosclerotic calcifications.  An IVC filter is in place.    The osseus structures demonstrate postsurgical changes of L5-S1 posterior fusion with disc spacer in place at this level.  There are degenerative changes without evidence of acute fracture or osseus destructive process.    There is a healing midline abdominal incision with persistent fluid collection along the inferior aspect of the incision measuring 3.6 x 8.2 x 2.5 cm demonstrating surrounding inflammatory changes.  There is anasarca.   Impression:       1. No pulmonary thromboembolism identified.  2. Patchy ground-glass opacification throughout the lungs compatible with pulmonary edema versus nonspecific infectious/inflammatory process.  3. Pulmonary consolidation overall improved when compared to CTA 05/08/2019 with persistent opacities the lung bases which may reflect atelectasis, aspiration, or pneumonia.  4. Moderate-sized pleural effusions bilaterally, mildly enlarged when compared to CTA 05/08/2019.  Minimal pericardial effusion, unchanged.  5. Left-sided percutaneous nephrostomy tube in stable position.  No hydronephrosis.  6. Persistent fluid collection within the anterior abdominal wall along the inferior aspect of the midline abdominal incision which is nonspecific and  may represent hematoma, seroma, or abscess.  7. Stable hypoattenuating lesions throughout the spleen which are nonspecific and may represent hematoma, infarct, or less likely abscess.  8. Anasarca.  9. Interval placement of IVC filter.  10. Multiple additional findings as detailed above.  This report was flagged in Epic as abnormal.    Electronically signed by resident: Adrián Becker  Date: 06/07/2019  Time: 00:30    Electronically signed by: Pancho Sheikh MD  Date: 06/07/2019  Time: 01:31   X-Ray Abdomen AP 1 View [[id number]] Resulted: 06/06/19 2224   Order Status: Completed Updated: 06/06/19 2226   Narrative:     EXAMINATION:  XR ABDOMEN AP 1 VIEW    CLINICAL HISTORY:  evaluate nephrostomy tube placement;    TECHNIQUE:  AP View(s) of the abdomen was performed.    COMPARISON:  None    FINDINGS:  Two supine views the abdomen presented.  There is a pigtail catheter overlying the left abdomen consistent with percutaneous nephrostomy.    There is L5-S1 spinal fusion hardware.  There appears to be a gastrostomy tube projecting in left upper quadrant.  The bowel gas pattern appears normal.  No pneumatosis or free air.  There is mild reticular opacities at the lung bases.  There is an IVC filter noted.  No fracture or calculus found.   Impression:       Normal bowel gas pattern.      Electronically signed by: Chad Jewell  Date: 06/06/2019  Time: 22:24   X-Ray Chest AP Portable [[id number]] Resulted: 06/06/19 1418   Order Status: Completed Updated: 06/06/19 1420   Narrative:     EXAMINATION:  XR CHEST AP PORTABLE    CLINICAL HISTORY:  Sepsis;    TECHNIQUE:  Single frontal view of the chest was performed.    COMPARISON:  Non 06/06/2019 e    FINDINGS:  Support devices remain.  Ill-defined patchy airspace consolidation and/or edema bilaterally identified without significant changes.  No significant pleural effusion.   Impression:       See above      Electronically signed by: Christian Valencia MD  Date: 06/06/2019  Time: 14:18

## 2019-06-11 NOTE — PLAN OF CARE
Pt initially scheduled to transfer to [facility] today but ID consulted for Fluid Collection of the anterior abdominal wall; IR to sample fluid collection before transfer to [facility].  Transfer has been placed on hold until tomorrow per Critical care medical management team.    Kristine Brandt LMSW  [facility]  [phone]

## 2019-06-11 NOTE — ASSESSMENT & PLAN NOTE
--Suspect volume overload in the setting of NSTEMI with concomitant pseudomonas pna.  --continue lasix 60 mg TID.   --continue meropenem for 5 day course for pseudomonas aeruginosa (5/19/19 and 6/6/19)  --trach collar during day as tolerated; mechanical ventilation at night.   --bilateral pleural effusions on imaging. Will evaluate for diagnostic thoracentesis.

## 2019-06-11 NOTE — PROGRESS NOTES
US guided aspiration complete. Pt tolerated well. VSS. No signs or symptoms of distress noted.  Dsg to abdomen CDI.  Pt will return to hospital room with critical care nurse.

## 2019-06-11 NOTE — PROGRESS NOTES
Pt arrived to Interventional Radiology room 188 via hospital bed with criticall care nurse and  for abscess drain.  Name verified using two identifiers.  Allergies verified.  Will continue to monitor.

## 2019-06-11 NOTE — PROGRESS NOTES
[facility]  Infectious Disease  Progress Note    Patient Name: Mariann Huff  MRN: [id number]  Admission Date: 6/6/2019  Length of Stay: 4 days  Attending Physician: Paul Molina MD  Primary Care Provider: Jasbir Haney MD    Isolation Status: No active isolations  Assessment/Plan:      Fever-resolved as of 6/9/2019     58 y/o female well known to ID with HTN, DMII, CAD, NSTEMI, s/p L5-S1 OLIF on 4/12 complicated by intraoperative left ureteral injury s/p ureteroureteral anastomoses and ureteral stent placement who initially presented on 4/20 with fevers, AMS and intraabdominal abscess, s/p washout and ligation of left ureter with nephrostomy tube placement on 4/21.  She  has had a long and complicated hospital course since that time (see HPI), including tracheostomy and PEG tube placement, bilateral upper and lower extremity DVT, s/p IVC filter placement on 5/29, rectal bleeding, rectal ulcer.  She is currently on long term antibiotic therapy for E coli bacteremia and Staph lugdenesis infection (abdominal source) with plan for subsequent suppressive therapy given concern for hardware involvement (end date 6/18/19).  Was treated for HAP (Pseudomonas) and candida glabrata UTI I late May.        ID  Was reconsulted again on 6/2 for recurrent fever.  Workup was unremarkable at that time, she had no recurrence of fever, and she was discharged to rehab on 6/5/19.  Fevers subsequently recurred at rehab and she was sent to ED on 6/6 for repeat imaging.  [place] [facility] but while in ED she became acutely SOB with hypoxemia, became hypotensive.  Was intubated.  Noted to have significant volume overload on CXR, troponins elevated, NSTEMI.  Antibiotics were broadened to vanc and cefepime.  ID re-consulted for antibiotic recommendations.      CTA was negative for PE.  Noted to have bilateral patchy ground glass opacification throughout the lungs; bilateral basilar consolidation overall  improved from prior CTA on 5/8; moderate bilateral pleural effusions. Noted to have  persistent fluid collection along the inferior aspect of her healing midline abdominal incision - 3.6 X 8.2 X 2.5 cm - with surrounding inflammatory changes.       Micro:  Repeat blood cultures NGTD   Mini BAL - pseudomonas  U/A - 27 WBC, no bacteria; urine culture negative    Afebrile today. Leukocytosis resolved.   Correa has been exchanged. Nephrostomy tube evaluated. Stitch added.    Blood cultures - NGTD  Respiratory cultures pseudomonas - Intermediate to cefepime and Cipro otherwise sensitive    Stable non septic  Plan  1.  vancomycin d/c'd  2.  Continue  Meroepenem - plan for 5days for presumed PNA  3.  Continue rifampin  mg twice a day.   4.  Recommend IR evaluation to assess if abdominal fluid collection is amenable to drain and send for cultures.   5.  If fevers persist, recommend removing PICC line.   6. Will needs suppression with Cefadroxil and rifampin once meropenem completed  7. ID will follow.              Anticipated Disposition: tbd    Thank you for your consult. I will follow-up with patient. Please contact us if you have any additional questions.    POLLY Jerome  Infectious Disease  [facility]    Subjective:     Principal Problem:Acute on chronic respiratory failure with hypoxia    HPI:   Ms. Huff is a 58 y/o female, known to ID throughout prolonged hospital admission,  with HTN, DMII, CAD, NSTEMI, s/p L5-S1 OLIF with NSGY 4/12, complicated by a left ureter transection, repaired with ureteroureteral anastomoses and ureteral stent placement.   She was discharged with a correa and MADHURI drain that was removed on 4/16.  She was seen by urology and anticipated stent removal in 2-3 months (6/2019).  She presented to ED on 4/20/19  with AMS and sepsis.   An MRI at that time showed postsurgical change of L5-S1 posterior spinal fusion and anterior interbody fusion and a 4.4 cm fluid  collection in the left anterior prevertebral soft tissues at the level of the L5-S1 disc space.  A CT showed air collection within the anterior paraspinous soft tissues through which the left ureter courses. Given that the ureter courses through this, communication of the ureter with this collection was not excluded.   She was taken to OR for washout and she underwent ex-lap of left ureter and left nephrostomy tube placement 4/21/19.   Blood cultures were positive for E coli.  Urine cultures +E.coli and OR cultures were + for Staph lugdenensis (pan sensitive).   ID was consulted at that time (see note of 4/22).  There was concern for hardware involvement and she was ultimately treated with ceftriaxone and rifampin with plan for 6 weeks of IV antibiotics (end date 6/4/19) with plan for oral suppressive therapy thereafter.         ID was reconsulted on 4/29/16 for fevers and leukocytosis.   Patient was broadened to Vanc, CTX and Rifampin, and later vanc was stopped.  Source of fevers and leukocytosis were unclear, though abdominal source was suspected.  Blood cultures were negative, CT abd showed a superficial fluid collection, C diff was negative, lines were changed, BAL was negative. She was noted to have a partially occluded thrombus in the RIJ and proximal subclavian.  Drug reaction was considered.  Patient improved and leukocytosis and fever resolved.    ID signed off with plans to complete Cefazolin and rifampin for 6 weeks through 6/4.     ID was again re-consulted on 5/20 for recurrent fever and leukocytosis.  Abdominal drain noted to be draining pus, she had some rectal bleeding, and CXR showed new left basilar consolidation concerning for HAP.  Endotracheal aspirate showed Pseudomonas. Blood cultures negative. Urine showed candida glabrata.  Antibiotics were broadened to Vancomycin, cefepime, rifampin and fluconazole and lines were exchanged.  A repeat CT abd showed persistent subcutaneous fluid collection  noted to be larger, but decreased fat stranding.  She was treated with 7 day course of Cefepime for HAP, a 7 day course of high dose fluconazole for candida glabrata and then transitioned back to cefazolin plus rifampin for prior E Coli and Staph lugdenensis and duration was extended to 8 week with end date of 6/18.      ID  reconsulted again on 6/2 for recurrent fever.  Workup was unremarkable at that time, she had no recurrence of fever, and she was discharged to rehab on 6/5/19.      Fevers subsequently recurred at rehab and she was sent to ED for repeat imaging.  She was stable on arrival to [facility], but while in ED she became acutely SOB with hypoxemia, became hypotensive.  Was intubated.  Noted to have significant volume overload on CXR.  Troponins elevated.      CTA was negative for PE.  Noted to have bilateral patch ground glass opacification throughout the lungs; bilateral basilar consolidation overall improved from prior CTA on 5/8; moderate bilateral pleural effusions; and persistent fluid collection along the inferor aspect of her healing midline abdominal incision - 3.6 X 8.2 X 2.5 cm - with surrounding inflammatory changes.       Micro:  Repeat blood cultures NGTD   Mini BAL - gram stain negative, culture pending  U/A - 27 WBC, no bacteria; urine culture pending  Interval History: No AEON. Afebrile and WBC 10.69,  Has a large amount of mucus but denies sob.  The patient denies any recent fever, chills, or sweats.      Review of Systems   Constitutional: Negative for activity change, chills, diaphoresis and fever.   Respiratory: Negative for cough, shortness of breath and wheezing.         + increased sputum   Cardiovascular: Negative for chest pain.   Gastrointestinal: Negative for abdominal pain, constipation, diarrhea, nausea and vomiting.   Genitourinary: Negative for dysuria, frequency and urgency.   Neurological: Negative for dizziness.   Hematological: Does not bruise/bleed easily.     Objective:      Vital Signs (Most Recent):  Temp: 98.4 °F (36.9 °C) (06/10/19 1115)  Pulse: 74 (06/10/19 1300)  Resp: 20 (06/10/19 1300)  BP: (!) 151/77 (06/10/19 1300)  SpO2: 97 % (06/10/19 1300) Vital Signs (24h Range):  Temp:  [98.4 °F (36.9 °C)-101.1 °F (38.4 °C)] 98.4 °F (36.9 °C)  Pulse:  [] 74  Resp:  [16-40] 20  SpO2:  [95 %-100 %] 97 %  BP: ()/(53-89) 151/77     Weight: 69.9 kg (154 lb)  Body mass index is 25.63 kg/m².    Estimated Creatinine Clearance: 46.3 mL/min (based on SCr of 1.3 mg/dL).    Physical Exam   Constitutional: She is oriented to person, place, and time. She appears well-developed and well-nourished. No distress (appears comfortable).       HENT:   Head: Normocephalic and atraumatic.   Tracheostomy in place - no drainage/erythema noted   Eyes: Conjunctivae are normal. Right eye exhibits no discharge. Left eye exhibits no discharge. No scleral icterus.   Cardiovascular: Normal rate and regular rhythm.   Pulmonary/Chest: No stridor. No tachypnea. No respiratory distress. She has no wheezes. She has no rhonchi. She has no rales (bases).   Crackles at bases     Abdominal: Soft. She exhibits no distension and no mass. There is no tenderness. There is no guarding.   Midline surgical  incision healed - c/d/i.   PEG site clear   Genitourinary:   Genitourinary Comments: Nephrostomy tube in place, leaking around insertion site.  No purulence noted.      Fontenot to gravity - urine orange tinged   Musculoskeletal: Normal range of motion. She exhibits no edema or tenderness.   Neurological: She is alert and oriented to person, place, and time.   Skin: Skin is warm and dry. No rash noted. She is not diaphoretic.   PICC RUE - site clear, non-tender   Psychiatric: She has a normal mood and affect. Her behavior is normal.   Nursing note and vitals reviewed.      Significant Labs:   Blood Culture:   Recent Labs   Lab 05/20/19  1306 06/01/19  1712 06/01/19  1713 06/06/19  1333 06/06/19  1346   LABBLOO No growth  after 5 days. No growth after 5 days. No growth after 5 days. No Growth to date  No Growth to date  No Growth to date  No Growth to date No Growth to date  No Growth to date  No Growth to date  No Growth to date     CBC:   Recent Labs   Lab 06/09/19  0400 06/10/19  0453   WBC 7.45 8.64   HGB 8.4* 8.7*   HCT 28.0* 29.3*    263     CMP:   Recent Labs   Lab 06/09/19  0400 06/10/19  0453   * 149*   K 4.3 4.2   * 111*   CO2 28 28   * 164*   BUN 38* 39*   CREATININE 1.1 1.3   CALCIUM 8.9 8.9   PROT 6.9 7.3   ALBUMIN 1.7* 1.8*   BILITOT 0.8 0.5   ALKPHOS 134 134   AST 26 24   ALT 10 10   ANIONGAP 9 10   EGFRNONAA 55.5* 45.3*     Respiratory Culture:   Recent Labs   Lab 04/30/19  0900 05/08/19  1300 05/19/19  1130 06/06/19  2012   GSRESP <10 epithelial cells per low power field.  No WBC's  No organisms seen Rare WBC's  No organisms seen Few WBC's  Few Gram negative rods <10 epithelial cells per low power field.  Few WBC's   No organisms seen   RESPIRATORYC  --  No growth PSEUDOMONAS AERUGINOSA  Many   No S aureus isolated.  PSEUDOMONAS AERUGINOSA  Few       Wound Culture:   Recent Labs   Lab 04/21/19  0125 05/19/19  1241   LABAERO STAPHYLOCOCCUS LUGDUNENSIS  Rare   No growth     All pertinent labs within the past 24 hours have been reviewed.    Significant Imaging: I have reviewed all pertinent imaging results/findings within the past 24 hours.   X-Ray Chest AP Portable [[id number]] Resulted: 06/10/19 0910   Order Status: Completed Updated: 06/10/19 0912   Narrative:     EXAMINATION:  XR CHEST AP PORTABLE    CLINICAL HISTORY:  pna;    TECHNIQUE:  Single frontal portable view of the chest was performed.    COMPARISON:  06/06/2019    FINDINGS:  Tracheostomy cannula remains in place with its tip in satisfactory position.  Right-sided PICC again seen with its tip at the cavoatrial junction.  Cardiomegaly, unchanged.  Continued pulmonary vascular congestion and interstitial/alveolar edema.   There may have been a little improvement in aeration at the right lower lung zone.  No pneumothorax or other detrimental change.   Impression:       As above      Electronically signed by: Denzel Doherty MD  Date: 06/10/2019  Time: 09:10   US Upper Extremity Veins Right [[id number]] (Abnormal) Resulted: 06/07/19 0317   Order Status: Completed Updated: 06/07/19 0319   Narrative:     EXAMINATION:  US UPPER EXTREMITY VEINS LEFT; US UPPER EXTREMITY VEINS RIGHT    CLINICAL HISTORY:  swelling;; pain/swelling;    TECHNIQUE:  Duplex and color flow Doppler evaluation and dynamic compression was performed of the bilateral upper extremity veins.    COMPARISON:  Ultrasound 05/12/2019    FINDINGS:  Right central veins: Central venous catheter identified within the axillary vein.  The internal jugular, subclavian, and axillary veins are patent and free of thrombus.    Right arm veins: Central venous catheter identified within the basilic vein.  There is thrombosis of the cephalic vein.  The brachial, and basilic veins are patent and compressible.    Left central veins: Persistent occlusive thrombus within the axillary vein.  The internal jugular and subclavian veins are patent and free of thrombus.    Left arm veins: Persistent occlusive thrombus within the basilic vein.  The brachial and cephalic veins are patent and compressible.   Impression:       1. Persistent occlusive deep vein thrombosis of the left axillary and basilic veins.  2. Superficial thrombophlebitis of the right cephalic vein.  3. Central venous catheter identified extending from the right basilic vein into the right axillary vein.  This report was flagged in Epic as abnormal.    Electronically signed by resident: Adrián Becker  Date: 06/07/2019  Time: 03:03    Electronically signed by: Pancho Sheikh MD  Date: 06/07/2019  Time: 03:17   US Upper Extremity Veins Left [[id number]] (Abnormal) Resulted: 06/07/19 0317   Order Status: Completed Updated: 06/07/19 0319    Narrative:     EXAMINATION:  US UPPER EXTREMITY VEINS LEFT; US UPPER EXTREMITY VEINS RIGHT    CLINICAL HISTORY:  swelling;; pain/swelling;    TECHNIQUE:  Duplex and color flow Doppler evaluation and dynamic compression was performed of the bilateral upper extremity veins.    COMPARISON:  Ultrasound 05/12/2019    FINDINGS:  Right central veins: Central venous catheter identified within the axillary vein.  The internal jugular, subclavian, and axillary veins are patent and free of thrombus.    Right arm veins: Central venous catheter identified within the basilic vein.  There is thrombosis of the cephalic vein.  The brachial, and basilic veins are patent and compressible.    Left central veins: Persistent occlusive thrombus within the axillary vein.  The internal jugular and subclavian veins are patent and free of thrombus.    Left arm veins: Persistent occlusive thrombus within the basilic vein.  The brachial and cephalic veins are patent and compressible.   Impression:       1. Persistent occlusive deep vein thrombosis of the left axillary and basilic veins.  2. Superficial thrombophlebitis of the right cephalic vein.  3. Central venous catheter identified extending from the right basilic vein into the right axillary vein.  This report was flagged in Epic as abnormal.    Electronically signed by resident: Adrián Becker  Date: 06/07/2019  Time: 03:03    Electronically signed by: Pancho Sheikh MD  Date: 06/07/2019  Time: 03:17   US Lower Extremity Veins Bilateral [[id number]] (Abnormal) Resulted: 06/07/19 0312   Order Status: Completed Updated: 06/07/19 0314   Narrative:     EXAMINATION:  US LOWER EXTREMITY VEINS BILATERAL    CLINICAL HISTORY:  pain/sweling;    TECHNIQUE:  Duplex and color flow Doppler and dynamic compression was performed of the bilateral lower extremity veins was performed.    COMPARISON:  Ultrasound 05/29/2019, 05/08/2019    FINDINGS:  Right thigh veins: Partially occlusive thrombus within the  common femoral vein with completely occlusive thrombus within the superficial femoral vein extending into the popliteal vein.  The deep femoral, and upper greater saphenous veins are patent and free of thrombus.    Right calf veins: There is occlusive thrombus within the posterior tibial veins and peroneal veins.  The anterior tibial veins are patent.    Left thigh veins: Partially occlusive thrombus within the common femoral vein near the femoral saphenous junction extending into the proximal superficial femoral vein.  The popliteal, upper greater saphenous, and deep femoral veins are patent and free of thrombus.    Left calf veins: There is thrombosis of one of the paired anterior tibial veins, similar to prior.  The posterior tibial veins and peroneal veins are patent.   Impression:       1. Extension of known thrombus within the right lower extremity veins, now extending from the common femoral vein into the superficial femoral, popliteal, posterior tibial, and peroneal veins.  2. Partially occlusive thrombus within the left common femoral vein extending into the proximal superficial femoral vein, not present on the most recent prior ultrasound.  3. Persistent thrombosis of one of the paired left anterior tibial veins.  This report was flagged in Epic as abnormal.    Electronically signed by resident: Adrián Becker  Date: 06/07/2019  Time: 02:54    Electronically signed by: Pancho Sheikh MD  Date: 06/07/2019  Time: 03:12   CT Abdomen Pelvis With Contrast [[id number]] (Abnormal) Resulted: 06/07/19 0131   Order Status: Completed Updated: 06/07/19 0133   Narrative:     EXAMINATION:  CTA CHEST NON CORONARY; CT ABDOMEN PELVIS WITH CONTRAST    CLINICAL HISTORY:  abrupt onset shortness of breath;; bacteremia;    TECHNIQUE:  The patient was surveyed from the lung apices through the pelvis after the administration of 100 cc Omni 350 IV contrast as well as oral contrast and data was reconstructed for coronal, sagittal,  and axial images.  Contrast timing was optimized to evaluate the pulmonary arteries.  MIP images were performed.    COMPARISON:  Radiograph 06/06/2019, 06/02/2019, 05/29/2019; CTA 05/22/2019, 05/08/2019; CT 05/01/2019.    FINDINGS:  Examination of the vascular and soft tissue structures at the base of the neck demonstrate a stable 1.4 cm in hypodensity in the left thyroid lobe.  A right-sided central venous catheter is in place with tip terminating in the superior vena cava.    Left sided aortic arch.  The thoracic aorta maintains normal caliber, contour, and course with mild atherosclerotic calcification within its course.  The heart is not enlarged.  There is calcific atherosclerosis of the coronary arteries.  Minimal pericardial effusion, unchanged.    The pulmonary arteries distribute normally. This study is adequate for the evaluation of pulmonary thromboembolism. There is no filling defect of the pulmonary arteries to suggest pulmonary thromboembolism.    A tracheostomy cannula is in place.  The trachea is midline, the proximal airways are patent, and the lungs are symmetrically expanded.  Patchy ground-glass opacification is present throughout the lungs.  There are consolidative opacities of the lung bases bilaterally, left greater than right, overall improved when compared to CTA 05/08/2019.  No pneumothorax.  There are moderate-sized pleural effusions bilaterally, slightly enlarged when compared to CTA 05/08/2019.    No axillary or mediastinal lymph node enlargement is seen.    The esophagus maintains a normal course and caliber.    The liver is enlarged measuring 22.1 cm.  No focal hepatic abnormality is seen.  The gallbladder is unremarkable.  No intrahepatic or extrahepatic biliary ductal dilatation is noted.  Portal vein, splenic vein, and SMV are patent.    There is a percutaneous gastrostomy tube in place.  The stomach, pancreas, and adrenal glands are unremarkable.  There are multiple hypoattenuating  lesions throughout the spleen, similar to CTA 05/22/2019.    The kidneys are normal in size and location.  Percutaneous left nephrostomy tube remains in stable position within the left lower pole.  No hydronephrosis is seen.  There are stable left renal cysts and additional bilateral subcentimeter renal hypodensities likely representing additional cysts.  Postsurgical changes of left ureteral repair are again noted.  The right ureter appears normal in course and caliber without evidence of ureteral dilatation. The urinary bladder is decompressed around a Fontenot catheter.  The uterus is surgically absent.    No evidence of bowel inflammation or obstruction. No ascites, free fluid, or intraperitoneal free air is identified.    There is no evidence of lymph node enlargement in the abdomen or pelvis.  The abdominal aorta is normal in course and caliber with significant atherosclerotic calcifications.  An IVC filter is in place.    The osseus structures demonstrate postsurgical changes of L5-S1 posterior fusion with disc spacer in place at this level.  There are degenerative changes without evidence of acute fracture or osseus destructive process.    There is a healing midline abdominal incision with persistent fluid collection along the inferior aspect of the incision measuring 3.6 x 8.2 x 2.5 cm demonstrating surrounding inflammatory changes.  There is anasarca.   Impression:       1. No pulmonary thromboembolism identified.  2. Patchy ground-glass opacification throughout the lungs compatible with pulmonary edema versus nonspecific infectious/inflammatory process.  3. Pulmonary consolidation overall improved when compared to CTA 05/08/2019 with persistent opacities the lung bases which may reflect atelectasis, aspiration, or pneumonia.  4. Moderate-sized pleural effusions bilaterally, mildly enlarged when compared to CTA 05/08/2019.  Minimal pericardial effusion, unchanged.  5. Left-sided percutaneous nephrostomy tube  in stable position.  No hydronephrosis.  6. Persistent fluid collection within the anterior abdominal wall along the inferior aspect of the midline abdominal incision which is nonspecific and may represent hematoma, seroma, or abscess.  7. Stable hypoattenuating lesions throughout the spleen which are nonspecific and may represent hematoma, infarct, or less likely abscess.  8. Anasarca.  9. Interval placement of IVC filter.  10. Multiple additional findings as detailed above.  This report was flagged in Epic as abnormal.    Electronically signed by resident: Adrián Becker  Date: 06/07/2019  Time: 00:30    Electronically signed by: Pancho Sheikh MD  Date: 06/07/2019  Time: 01:31   CTA Chest Non-Coronary - PE Study [[id number]] (Abnormal) Resulted: 06/07/19 0131   Order Status: Completed Updated: 06/07/19 0133   Narrative:     EXAMINATION:  CTA CHEST NON CORONARY; CT ABDOMEN PELVIS WITH CONTRAST    CLINICAL HISTORY:  abrupt onset shortness of breath;; bacteremia;    TECHNIQUE:  The patient was surveyed from the lung apices through the pelvis after the administration of 100 cc Omni 350 IV contrast as well as oral contrast and data was reconstructed for coronal, sagittal, and axial images.  Contrast timing was optimized to evaluate the pulmonary arteries.  MIP images were performed.    COMPARISON:  Radiograph 06/06/2019, 06/02/2019, 05/29/2019; CTA 05/22/2019, 05/08/2019; CT 05/01/2019.    FINDINGS:  Examination of the vascular and soft tissue structures at the base of the neck demonstrate a stable 1.4 cm in hypodensity in the left thyroid lobe.  A right-sided central venous catheter is in place with tip terminating in the superior vena cava.    Left sided aortic arch.  The thoracic aorta maintains normal caliber, contour, and course with mild atherosclerotic calcification within its course.  The heart is not enlarged.  There is calcific atherosclerosis of the coronary arteries.  Minimal pericardial effusion,  unchanged.    The pulmonary arteries distribute normally. This study is adequate for the evaluation of pulmonary thromboembolism. There is no filling defect of the pulmonary arteries to suggest pulmonary thromboembolism.    A tracheostomy cannula is in place.  The trachea is midline, the proximal airways are patent, and the lungs are symmetrically expanded.  Patchy ground-glass opacification is present throughout the lungs.  There are consolidative opacities of the lung bases bilaterally, left greater than right, overall improved when compared to CTA 05/08/2019.  No pneumothorax.  There are moderate-sized pleural effusions bilaterally, slightly enlarged when compared to CTA 05/08/2019.    No axillary or mediastinal lymph node enlargement is seen.    The esophagus maintains a normal course and caliber.    The liver is enlarged measuring 22.1 cm.  No focal hepatic abnormality is seen.  The gallbladder is unremarkable.  No intrahepatic or extrahepatic biliary ductal dilatation is noted.  Portal vein, splenic vein, and SMV are patent.    There is a percutaneous gastrostomy tube in place.  The stomach, pancreas, and adrenal glands are unremarkable.  There are multiple hypoattenuating lesions throughout the spleen, similar to CTA 05/22/2019.    The kidneys are normal in size and location.  Percutaneous left nephrostomy tube remains in stable position within the left lower pole.  No hydronephrosis is seen.  There are stable left renal cysts and additional bilateral subcentimeter renal hypodensities likely representing additional cysts.  Postsurgical changes of left ureteral repair are again noted.  The right ureter appears normal in course and caliber without evidence of ureteral dilatation. The urinary bladder is decompressed around a Fontenot catheter.  The uterus is surgically absent.    No evidence of bowel inflammation or obstruction. No ascites, free fluid, or intraperitoneal free air is identified.    There is no  evidence of lymph node enlargement in the abdomen or pelvis.  The abdominal aorta is normal in course and caliber with significant atherosclerotic calcifications.  An IVC filter is in place.    The osseus structures demonstrate postsurgical changes of L5-S1 posterior fusion with disc spacer in place at this level.  There are degenerative changes without evidence of acute fracture or osseus destructive process.    There is a healing midline abdominal incision with persistent fluid collection along the inferior aspect of the incision measuring 3.6 x 8.2 x 2.5 cm demonstrating surrounding inflammatory changes.  There is anasarca.   Impression:       1. No pulmonary thromboembolism identified.  2. Patchy ground-glass opacification throughout the lungs compatible with pulmonary edema versus nonspecific infectious/inflammatory process.  3. Pulmonary consolidation overall improved when compared to CTA 05/08/2019 with persistent opacities the lung bases which may reflect atelectasis, aspiration, or pneumonia.  4. Moderate-sized pleural effusions bilaterally, mildly enlarged when compared to CTA 05/08/2019.  Minimal pericardial effusion, unchanged.  5. Left-sided percutaneous nephrostomy tube in stable position.  No hydronephrosis.  6. Persistent fluid collection within the anterior abdominal wall along the inferior aspect of the midline abdominal incision which is nonspecific and may represent hematoma, seroma, or abscess.  7. Stable hypoattenuating lesions throughout the spleen which are nonspecific and may represent hematoma, infarct, or less likely abscess.  8. Anasarca.  9. Interval placement of IVC filter.  10. Multiple additional findings as detailed above.  This report was flagged in Epic as abnormal.    Electronically signed by resident: Adrián Becker  Date: 06/07/2019  Time: 00:30    Electronically signed by: Pancho Sheikh MD  Date: 06/07/2019  Time: 01:31   X-Ray Abdomen AP 1 View [[id number]] Resulted: 06/06/19  2224   Order Status: Completed Updated: 06/06/19 2226   Narrative:     EXAMINATION:  XR ABDOMEN AP 1 VIEW    CLINICAL HISTORY:  evaluate nephrostomy tube placement;    TECHNIQUE:  AP View(s) of the abdomen was performed.    COMPARISON:  None    FINDINGS:  Two supine views the abdomen presented.  There is a pigtail catheter overlying the left abdomen consistent with percutaneous nephrostomy.    There is L5-S1 spinal fusion hardware.  There appears to be a gastrostomy tube projecting in left upper quadrant.  The bowel gas pattern appears normal.  No pneumatosis or free air.  There is mild reticular opacities at the lung bases.  There is an IVC filter noted.  No fracture or calculus found.   Impression:       Normal bowel gas pattern.      Electronically signed by: Chad Jewell  Date: 06/06/2019  Time: 22:24   X-Ray Chest AP Portable [[id number]] Resulted: 06/06/19 1418   Order Status: Completed Updated: 06/06/19 1420   Narrative:     EXAMINATION:  XR CHEST AP PORTABLE    CLINICAL HISTORY:  Sepsis;    TECHNIQUE:  Single frontal view of the chest was performed.    COMPARISON:  Non 06/06/2019 e    FINDINGS:  Support devices remain.  Ill-defined patchy airspace consolidation and/or edema bilaterally identified without significant changes.  No significant pleural effusion.   Impression:       See above      Electronically signed by: Christian Valencia MD  Date: 06/06/2019

## 2019-06-11 NOTE — H&P
Inpatient Radiology Pre-procedure Note    History of Present Illness:  Mariann Huff is a 59 y.o. female with subcutaneous abscess who presents for US guided aspiration.    Admission H&P reviewed.  Past Medical History:   Diagnosis Date    Anticoagulant long-term use     Asthma     Back pain     Bradycardia, unspecified 2019    The etiology of the bradycardic episode is unclear.  The have appear to be respiratory in origin (at least the 1st episode).  We will continue to monitor carefully.  We are awaiting evaluation by Cardiology.      CAD (coronary artery disease)     s/p stentimg  (2), (1)    Carotid artery stenosis     Diabetes mellitus type 2 in obese     HTN (hypertension), benign     Hyperlipidemia     Keloid cicatrix     NPDR (nonproliferative diabetic retinopathy) 2015    NSTEMI (non-ST elevated myocardial infarction)     Nuclear sclerosis - Right Eye 3/18/2014    Primary localized osteoarthrosis, lower leg 2014    Sleep apnea      Past Surgical History:   Procedure Laterality Date    BRONCHOSCOPY N/A 2019    Performed by [facility] at [facility] OR 2ND FLR    CARDIAC CATHETERIZATION      cataract extraction left eye      cataracts      CATHETERIZATION, HEART, LEFT Left 2014    Performed by Wilman Kim MD at [facility] CATH LAB     SECTION, LOW TRANSVERSE      COLONOSCOPY N/A 2019    Performed by Al Alaniz MD at [facility] ENDO (2ND FLR)    CORONARY ANGIOPLASTY      Creation, Nephrostomy, Percutaneous Left 2019    Performed by Karina Surgeon at [facility] KARINA    CREATION, TRACHEOSTOMY N/A 2019    Performed by Sean [facility]ano MD at [facility] OR 2ND FLR    EGD, WITH PEG TUBE INSERTION  2019    Performed by Sean [facility]ano MD at [facility] OR 2ND FLR    ESOPHAGOGASTRODUODENOSCOPY (EGD) N/A 2016    Performed by Gardenia Adamson MD at [facility] ENDO (4TH FLR)    EXCISION TURBINATE, SUBMUCOUS      FUSION, SPINE, LUMBAR, ANTERIOR  APPROACH Left L5-S1 Anterior to Psoa Interbody Fusion, L5-S1 Posterior Instrumentation Left 4/12/2019    Performed by Mk George MD at [facility] OR 2ND FLR    HAND SURGERY Left     HAND SURGERY Right     torn ligament repair/ Dr. Yeboah    HYSTERECTOMY      Injection,steroid,epidural,transforaminal approach - Bilateral - S1 Bilateral 9/25/2018    [facility]    Injection-steroid-epidural-caudal N/A 2/7/2019    Performed by Dave Bentley Jr., [facility] at [facility] PAIN MGT    INSERTION-INTRAOCULAR LENS (IOL) Right 9/1/2015    Performed by Good Domingo MD at [facility] OR 1ST FLR    LAPAROTOMY, EXPLORATORY; LIGATION OF LEFT URETER; DOUBLE J STENT REMOVAL LEFT URETER Left 4/20/2019    Performed by Paul Carlson MD at [facility] OR 2ND FLR    left foot surgery      left wrist surgery      NASAL SEPTUM SURGERY  5/7/15    PHACOEMULSIFICATION-ASPIRATION-CATARACT Right 9/1/2015    Performed by Good Domingo MD at [facility] OR 1ST FLR    REPAIR, URETER  4/12/2019    Performed by Mk George MD at [facility] OR 2ND FLR    RESECTION-TURBINATES (SMR) N/A 5/7/2015    Performed by Dileep Dubois III, MD at [facility] OR 2ND FLR    rt elbow surgery      S/P LAD COATED STENT  05/14/2010    6 total     S/P OM1 STENT  08/2003    SEPTOPLASTY N/A 5/7/2015    Performed by Dileep Dubois III, MD at [facility] OR 2ND FLR    SIGMOIDOSCOPY, FLEXIBLE N/A 5/21/2019    Performed by ALBERTO Amin MD at [facility] ENDO (2ND FLR)    SIGMOIDOSCOPY, FLEXIBLE N/A 5/13/2019    Performed by ALBERTO Amin MD at [facility] ENDO (2ND FLR)    SINUS SURGERY      F.E.S.S.    SINUS SURGERY FUNCTIONAL ENDOSCOPIC WITH NAVIGATION WITH MAXILLARIES, ETHMOIDS, LEFT SPHENOID, LEFT LOLY N/A 5/7/2015    Performed by Dileep Dubois III, MD at [facility] OR 2ND FLR    STENT, URETERAL placement  4/12/2019    Performed by Robin Boyd MD at [facility] OR 2ND FLR    TUBAL LIGATION         Review of Systems:   As documented in  "primary team H&P    Home Meds:   Prior to Admission medications    Medication Sig Start Date End Date Taking? Authorizing Provider   ACCU-CHEK EDIN PLUS METER Misc  9/23/14   Historical Provider, MD   albuterol (PROVENTIL/VENTOLIN HFA) 90 mcg/actuation inhaler Inhale 2 puffs into the lungs every 6 (six) hours as needed for Wheezing. 11/30/18   Jasbir Haney MD   amLODIPine (NORVASC) 10 MG tablet TAKE 1 TABLET (10 MG TOTAL) BY MOUTH ONCE DAILY. 8/15/18   Adelaide Aguilar MD   aspirin 81 mg Tab Take 81 mg by mouth. 1 Tablet Oral Every day    Historical Provider, MD   atorvastatin (LIPITOR) 40 MG tablet TAKE 1 TABLET (40 MG TOTAL) BY MOUTH ONCE DAILY. 7/7/17   Adelaide Aguilar MD   BD INSULIN PEN NEEDLE UF MINI 31 x 3/16 " Ndle USE 4 TIMES A DAY 10/4/14   Rosmery Fisher NP   blood sugar diagnostic (ACCU-CHEK EDIN PLUS TEST STRP) Strp TEST BLOOD SUGARS 4 TIMES DAILY 8/31/15   Rosmery Fisher NP   chlorthalidone (HYGROTEN) 50 MG Tab Take 1 tablet (50 mg total) by mouth once daily. 12/7/18   Jasbir Haney MD   esomeprazole (NEXIUM) 40 MG capsule TAKE 1 CAPSULE (40 MG TOTAL) BY MOUTH BEFORE BREAKFAST. 12/6/18   Jasbir Haney MD   fenofibrate micronized (LOFIBRA) 134 MG Cap TAKE 1 CAPSULE (134 MG TOTAL) BY MOUTH BEFORE BREAKFAST. 12/7/18   Jasbir Haney MD   flash glucose scanning reader (FREESTYLE MISTI 14 DAY READER) Misc 1 each by Misc.(Non-Drug; Combo Route) route once daily. 12/7/18   Jasbir Haney MD   flash glucose sensor (FREESTYLE MISTI 14 DAY SENSOR) Kit 1 each by Misc.(Non-Drug; Combo Route) route once daily. 12/7/18   Jasbir Haney MD   gabapentin (NEURONTIN) 100 MG capsule Take 2 capsules (200 mg total) by mouth every evening. 7/23/18 7/23/19  Jasbir Haney MD   irbesartan (AVAPRO) 300 MG tablet Take 1 tablet (300 mg total) by mouth every evening. 11/7/18 11/7/19  Jasbir Haney MD   lancets 30 gauge Misc  1/9/17   Historical Provider, MD   metoprolol succinate (TOPROL-XL) 100 MG 24 hr tablet TAKE 1 TABLET (100 MG TOTAL) BY " "MOUTH ONCE DAILY. 1/10/19   Jasbir Haney MD   nitroGLYCERIN (NITROSTAT) 0.4 MG SL tablet Take one every 2-3 min till chestpain relief for 3 times and if still no relief, call MD or come to ED 17   Jasbir Haney MD   omega-3 acid ethyl esters (LOVAZA) 1 gram capsule Take 1 g by mouth once daily.  11/10/14   Historical Provider, MD   pen needle, diabetic (BD ULTRA-FINE GRABIEL PEN NEEDLES) 32 gauge x 5/32" Ndle For use with insulin pens daily 4 times a day 3/10/16   Jasbir Haney MD   tamsulosin (FLOMAX) 0.4 mg Cap Take 1 capsule (0.4 mg total) by mouth every evening. 19   Robin Boyd MD   tramadol (ULTRAM) 50 mg tablet Take 1 tablet (50 mg total) by mouth 3 (three) times daily as needed for Pain. 10/24/16   Jasbir Haney MD   zolpidem (AMBIEN) 5 MG Tab Take 1 tablet (5 mg total) by mouth nightly as needed. 18   Jasbir Haney MD     Scheduled Meds:    aspirin  81 mg Per G Tube Daily    atorvastatin  80 mg Per G Tube Daily    chlorhexidine  15 mL Mouth/Throat BID    famotidine  20 mg Per G Tube BID    furosemide  60 mg Intravenous TID    heparin (porcine)  5,000 Units Subcutaneous Q8H    losartan  25 mg Per G Tube Daily    meropenem (MERREM) IVPB  2 g Intravenous Q8H    metoprolol  25 mg Per G Tube BID    rifAMpin  300 mg Per G Tube BID    scopolamine  1 patch Transdermal Q3 Days    sodium chloride 0.9%  3 mL Intravenous Q8H     Continuous Infusions:   PRN Meds:sodium chloride, sodium chloride, acetaminophen, albuterol-ipratropium, Dextrose 10% Bolus, Dextrose 10% Bolus, [] fentaNYL **FOLLOWED BY** fentaNYL, glucagon (human recombinant), insulin aspart U-100, loperamide, magnesium sulfate IVPB, magnesium sulfate IVPB, morphine, nitroGLYCERIN, ondansetron, potassium chloride 10%, potassium chloride 10%, potassium chloride 10%, potassium, sodium phosphates, potassium, sodium phosphates, potassium, sodium phosphates, promethazine (PHENERGAN) IVPB, sodium chloride 0.9%  Anticoagulants/Antiplatelets: aspirin " and Heparin    Allergies: Review of patient's allergies indicates:  No Known Allergies  Sedation Hx: have not been any systemic reactions    Labs:  Recent Labs   Lab 06/06/19  1333   INR 1.1       Recent Labs   Lab 06/11/19  0355   WBC 8.13   HGB 9.3*   HCT 30.8*   MCV 91         Recent Labs   Lab 06/11/19  0355   *   *   K 3.5      CO2 28   BUN 42*   CREATININE 0.9   CALCIUM 9.2   MG 2.1   ALT 7*   AST 18   ALBUMIN 1.8*   BILITOT 0.5         Vitals:  Temp: 98.8 °F (37.1 °C) (06/11/19 1100)  Pulse: 76 (06/11/19 1643)  Resp: 16 (06/11/19 1643)  BP: 138/72 (06/11/19 1643)  SpO2: 98 % (06/11/19 1643)     Physical Exam:  ASA: 3  Mallampati: Tracheostomy    General: no acute distress  Mental Status: alert and oriented to person, place and time  HEENT: normocephalic, atraumatic  Chest: unlabored breathing  Heart: regular heart rate  Abdomen: nondistended  Extremity: moves all extremities    Plan: Proceed with aspiration  Sedation Plan: Local only    Bishnu Jiménez MD  Diagnostic and Interventional Radiologist  Department of Radiology  Pager: [phone]

## 2019-06-11 NOTE — PLAN OF CARE
Problem: Adult Inpatient Plan of Care  Goal: Plan of Care Review  Outcome: Ongoing (interventions implemented as appropriate)  Pt AAOx4, follows commands. On vent overnight, A/C 30%, 5 of PEEP, O2 >95%. Other VSS. Nephrostomy tube  cc/hr. UO 10-65 cc/hr, team notified of drop. 2 liquidy, loose BM. Pain managed with prn morphine and fentanyl. Water bolus done Q6H. Pt and family updated on plan of care throughout shift. See flow sheet for assessment data.

## 2019-06-11 NOTE — ASSESSMENT & PLAN NOTE
--Cardiology following. TTE on 6/7/19 with LVEF 40-45%, grade 2 diastolic dysfunction and segmental wall motion abnormalities. She is currently not a candidate for aggressive cardiology intervention given her anemia requiring pRBC transfusion.   --Troponin peak 5.9  --On aspirin, metoprolol, and losartan.   --Continuing diuresis with lasix  --Holding on anticoagulation due to recent bleeding from rectal ulcer.

## 2019-06-11 NOTE — ASSESSMENT & PLAN NOTE
--change pip tazo to meropenem for 5 day course  --ID following  --Present on admission.   --see respiratory failure

## 2019-06-11 NOTE — PROGRESS NOTES
[facility]  Critical Care Medicine  Progress Note    Patient Name: Mariann Huff  MRN: [id number]  Admission Date: 6/6/2019  Hospital Length of Stay: 5 days  Code Status: Full Code  Attending Provider: Paul Molina MD  Primary Care Provider: Jasbir Haney MD   Principal Problem: Acute on chronic respiratory failure with hypoxia    Subjective:     HPI:  Mariann Huff is a 60 y/o female with history of Asthma, HTN, HLD,  CAD (LAD GINGER proximal and distal 2013 and GINGER ostial LAD 2/2014), HFpEF, NSTEMI, T2DM, Obesity, Sleep Apnea, and back pain who presented to the ED on 6/6/19 with acute shortness of breath started last night.      Mrs. Huff has had two recent hospitalizations. 4/12/19-4/14/19 for L5-S1 OLIF with Neurosurgery with intraoperaative left ureteral injury with ureteroureteral anastomosis and ureteral stent placement. Second admission, 4/20/19-6/5/19 for intraabdominal abscess s/p washout and ligation of left ureter with nephrostomy tube placement on 4/21/19 and tracheostomy and PEG placement on 5/2/19.  Most recent hospitalization complicated by BUE and RLE DVT s/p IVC filter on 5/29/19 given concern for GIB with workup revealing rectal ulcer requiring pRBC transfusion.  She was followed by ID during admission and is being treated with long term antibiotic therapy for E coli and Staph lugdenesis bacteremia with IV cefazolin and rifampin with end dated 6/18/19 given hardware.  She also completed 7 day course of cefepime for HAP (pseudomonas) on 5/25/19 and candida glabrata UTI that she received 7 days of high dose fluconazole.   She was discharged to rehab.    In the ED, she was started empirically on vancomycin and cefepime for possible PNA, CTA chest ordered for possible PE.  Labs remarkable for BNP 1403, lactate wnl, wbc 13 K, and sCr 1.4 ( from 0.8).  She was given a duo neb and placed on PSV. She is being admitted to the ICU with critical care medicine for further workup and  evaluation.      Hospital/ICU Course:  Ms. Huff was admitted to the MICU on 06/06. The morning of 06/07/19 her Hb had dropped below 7 so she was transfused 2 units of PRBCs for acute blood loss from bleeding rectal ulcer. Cardiology was consulted for concern of NSTEMI 2/2 increasing Troponin; however, recommended not to treat as ACS because of bleeding and to manage with medical therapy. Repeat echo with reduced EF 40-45% from 53% and new diastolic dysfunction, wall motion abnormalities and worsening mitral regurgitation from previous echo on 05/09/19. She is diuresing well with lasix 60mg IV TID. Her sputum culture is positive for pseudomonas with resistance to pip tazo so abx changed to meropenem. ID following. Daily trach collar trials continue with a noted improvement on 06/10/19. LTACH consult placed     Interval History/Significant Events: No acute events overnight. Placed on mechanical ventilation overnight.       Review of Systems   Respiratory: Negative for shortness of breath.    Cardiovascular: Negative for chest pain.   Gastrointestinal: Negative for abdominal pain, nausea and vomiting.     Objective:     Vital Signs (Most Recent):  Temp: 98.8 °F (37.1 °C) (06/11/19 1100)  Pulse: 77 (06/11/19 1430)  Resp: (!) 29 (06/11/19 1430)  BP: 128/68 (06/11/19 1430)  SpO2: (!) 91 % (06/11/19 1430) Vital Signs (24h Range):  Temp:  [98.5 °F (36.9 °C)-99 °F (37.2 °C)] 98.8 °F (37.1 °C)  Pulse:  [63-85] 77  Resp:  [14-41] 29  SpO2:  [78 %-100 %] 91 %  BP: ()/(55-83) 128/68   Weight: 69.9 kg (154 lb)  Body mass index is 25.63 kg/m².      Intake/Output Summary (Last 24 hours) at 6/11/2019 1447  Last data filed at 6/11/2019 1400  Gross per 24 hour   Intake 2596 ml   Output 1675 ml   Net 921 ml       Physical Exam   Constitutional: She appears well-developed. She has a sickly appearance. No distress.   HENT:   Head: Normocephalic.   Mouth/Throat: Oropharynx is clear and moist.   Eyes: Pupils are equal, round, and  reactive to light. Conjunctivae and EOM are normal. Right eye exhibits normal extraocular motion and no nystagmus. Left eye exhibits normal extraocular motion and no nystagmus.   Cardiovascular: Normal rate, regular rhythm, normal heart sounds and intact distal pulses.   No murmur heard.  Pulses:       Radial pulses are 2+ on the right side, and 2+ on the left side.        Dorsalis pedis pulses are 2+ on the right side, and 2+ on the left side.   Warm extremities, no peripheral edema   Pulmonary/Chest: No accessory muscle usage. No tachypnea. No respiratory distress. She has decreased breath sounds in the right lower field and the left lower field. She has no wheezes. She has rales (bilaterally) in the right lower field and the left lower field.   6 shiley tracheostomy. On mechanical ventilation   Abdominal: Soft. Bowel sounds are normal. There is no tenderness.       Genitourinary:   Genitourinary Comments: Left nephrostomy tube and urine catheter   Neurological: She is alert. No sensory deficit.   Alert, following request, mouthing words, moving all extremities equally.    Skin: Skin is warm and dry. She is not diaphoretic.        Nursing note and vitals reviewed.      Vents:  Vent Mode: Spont (06/11/19 1105)  Ventilator Initiated: Yes (06/06/19 1416)  Set Rate: 16 bmp (06/11/19 0730)  Vt Set: 400 mL (06/11/19 0730)  Pressure Support: 10 cmH20 (06/11/19 1105)  PEEP/CPAP: 5 cmH20 (06/11/19 1105)  Oxygen Concentration (%): 30 (06/11/19 1400)  Peak Airway Pressure: 15 cmH2O (06/11/19 1105)  Plateau Pressure: 21 cmH20 (06/11/19 1105)  Total Ve: 9.28 mL (06/11/19 1105)  F/VT Ratio<105 (RSBI): (!) 66.47 (06/11/19 1105)  Lines/Drains/Airways     Peripherally Inserted Central Catheter Line                 PICC Double Lumen 05/24/19 0900 right brachial 18 days          Drain                 Nephrostomy 04/21/19 0629 Left 8 Fr. 51 days         Gastrostomy/Enterostomy 05/02/19 1134 Percutaneous endoscopic gastrostomy (PEG)  LUQ decompression;feeding 40 days         Urethral Catheter 06/06/19 1835 16 Fr. 4 days          Airway                 Surgical Airway 06/03/19 1234 Shiley Cuffed 8 days              Significant Labs:    CBC/Anemia Profile:  Recent Labs   Lab 06/10/19  0453 06/11/19  0355   WBC 8.64 8.13   HGB 8.7* 9.3*   HCT 29.3* 30.8*    287   MCV 92 91   RDW 15.8* 15.4*        Chemistries:  Recent Labs   Lab 06/10/19  0453 06/11/19  0355   * 148*   K 4.2 3.5   * 109   CO2 28 28   BUN 39* 42*   CREATININE 1.3 0.9   CALCIUM 8.9 9.2   ALBUMIN 1.8* 1.8*   PROT 7.3 7.4   BILITOT 0.5 0.5   ALKPHOS 134 128   ALT 10 7*   AST 24 18   MG 2.9* 2.1   PHOS 3.2 2.3*       All pertinent labs within the past 24 hours have been reviewed.    Significant Imaging:  I have reviewed and interpreted all pertinent imaging results/findings within the past 24 hours.      ABG  Recent Labs   Lab 06/08/19  0929   PH 7.397   PO2 36*   PCO2 45.7*   HCO3 28.1*   BE 3     Assessment/Plan:     Pulmonary  * Acute on chronic respiratory failure with hypoxia  --Suspect volume overload in the setting of NSTEMI with concomitant pseudomonas pna.  --continue lasix 60 mg TID.   --continue meropenem for 5 day course for pseudomonas aeruginosa (5/19/19 and 6/6/19)  --trach collar during day as tolerated; mechanical ventilation at night.   --bilateral pleural effusions on imaging. Will evaluate for diagnostic thoracentesis.     Pneumonia of both lungs due to Pseudomonas species  --change pip tazo to meropenem for 5 day course  --ID following  --Present on admission.   --see respiratory failure    Cardiac/Vascular  Elevated troponin  --see NSTEMI    (HFpEF) heart failure with preserved ejection fraction  --concern for acute on chronic heart failure exacerbation  --TTE on 05/06 with reduced EF 40-45%, down from 53% on echo from 05/09  --new WMA and grade II diastolic dysfunction and moderate mitral regurgitation  --diurese with lasix 60 mg TID  --continue  medical management lopressor and losartan   --overall negative 4 L      Essential hypertension  --on metoprolol and losartan for GDMT         NSTEMI (non-ST elevated myocardial infarction)  --Cardiology following. TTE on 6/7/19 with LVEF 40-45%, grade 2 diastolic dysfunction and segmental wall motion abnormalities. She is currently not a candidate for aggressive cardiology intervention given her anemia requiring pRBC transfusion.   --Troponin peak 5.9  --On aspirin, metoprolol, and losartan.   --Continuing diuresis with lasix  --Holding on anticoagulation due to recent bleeding from rectal ulcer.      CAD (coronary artery disease)  --GINGER to LAD 2013 and 2010. ]  --See NSTEMI    Oncology  Normocytic anemia  --suspect secondary to prolonged hospitalization with critical illness and recent rectal bleeding.  --hgb 6.4 on 06/07 Transfused 2 units of PRBCs.   --No current evidence of ongoing bleeding.     Endocrine  Type 2 diabetes mellitus, with long-term current use of insulin  --A1c 5.4.    --frequent glucose checks with SSI    Other  Palliative care encounter  --appreciate palliative care    Recent E coli (UTI/Bacteremia) and Staph lugdenesis (abdominal fluid) infections   --previous regimen IV cefazolin and rifampin with end dated 6/18/19 given hardware.  Plan to resume longterm antibiotics with Cefadroxil and rifampin once meropenum course complete.  --continue rifampin.    --Appreciate ID recommendations  --IR consulted for aspiration of persistent abdominal fluid collection.   --chronic correa; changed upon   --PICC RUE placed 5/24/19. Remove if clinically worsens or evidence of bacteremia.     DVT ppx:  Heparin SQ     Planning for LTAC once diagnotic testing is complete.     Discussed plan with Dr. Molina     I spent >70 minutes reviewing patient records, examining, and counseling the patient with greater than 50% of the time spent with direct patient care and coordination.     Lisa George NP  Critical Care  Medicine  [facility]

## 2019-06-11 NOTE — PROCEDURES
Radiology Post-Procedure Note    Pre Op Diagnosis: Superficial fluid collection  Post Op Diagnosis: Same    Procedure: Abscess aspiration    Procedure performed by: Gordy    Written Informed Consent Obtained: Yes  Specimen Removed: YES 11 mL serosanguinous fluid  Estimated Blood Loss: Minimal    Findings:   Successful US guided superficial fluid collection aspiration.  11 mL serosanguinous fluid removed.  Near complete resolution noted.  No complications.    Patient tolerated procedure well.    Bishnu Jiménez MD  Diagnostic and Interventional Radiologist  Department of Radiology  Pager: [phone]

## 2019-06-11 NOTE — CONSULTS
[facility]  Palliative Medicine  Consult Note    Patient Name: Mariann Huff  MRN: [id number]  Admission Date: 6/6/2019  Hospital Length of Stay: 5 days  Code Status: Full Code   Attending Provider: Paul Molina MD  Consulting Provider: Abraham Garcia MD  Primary Care Physician: Jasbir Haney MD  Principal Problem:Acute on chronic respiratory failure with hypoxia    Patient information was obtained from patient, past medical records and ER records.      Consults  Assessment/Plan:     Palliative care encounter  58 yo female with readmission to hospital from brief stay at LTACHwith history of Asthma, HTN, HLD,  CAD (LAD GINGER proximal and distal 2013 and GINGER ostial LAD 2/2014), HFpEF, NSTEMI, T2DM, Obesity, Sleep Apnea who has had multiple complications s/p spinal surgery. Readmitted with GI bleeds from rectal bleeding ulcer and NSTEMI with resp failure.    The palliative medicine team will contiue to follow to help with ongoing support in a prolonged recovery period.     We will also help aid in medical decision making and goals of care where needed.        Thank you for your consult. I will follow-up with patient. Please contact us if you have any additional questions.    Subjective:     HPI:   HPI per initial note:   Mariann Huff is a 60 y/o female with history of Asthma, HTN, HLD,  CAD (LAD GINGER proximal and distal 2013 and GINGER ostial LAD 2/2014), HFpEF, NSTEMI, T2DM, Obesity, Sleep Apnea, and back pain who presented to the ED on 6/6/19 with acute shortness of breath that started last night.       Mrs. Huff has had two recent hospitalizations. 4/12/19-4/14/19 for L5-S1 OLIF with Neurosurgery with intraoperaative left ureteral injury with ureteroureteral anastomosis and ureteral stent placement. Second admission, 4/20/19-6/5/19 for intraabdominal abscess s/p washout and ligation of left ureter with nephrostomy tube placement on 4/21/19 and tracheostomy and PEG placement on 5/2/19.  Most  recent hospitalization complicated by BUE and RLE DVT s/p IVC filter on 5/29/19 given concern rectal bleeding requiring pRBC transfusion with workup revealing rectal ulcer.  She was followed by ID during admission and is being treated with long term antibiotic therapy for E coli and Staph lugdenesis bacteremia with IV cefazolin and rifampin with end dated 6/18/19 given hardware.  She also completed 7 day course of cefepime for HAP (pseudomonas) on 5/25/19 and candida glabrata UTI that she received 7 days of high dose fluconazole.   She was discharged to rehab on 6/5/19.     In the ED, she was started empirically on vancomycin and cefepime for possible PNA, CTA chest ordered for possible PE.  Labs remarkable for BNP 1403, lactate wnl, wbc 13 K, and sCr 1.4 ( from 0.8).  She was given a duo neb and placed on PSV. She was admitted to the ICU with critical care medicine for further workup and evaluation.      In this setting, palliative medicine was consulted to help with medical decision making and aid in the formation of goals of care.       Hospital Course:  No notes on file    Interval History: Pt seen at the bedside.  No family present.  Pt engaged but admits to being disheartened    Past Medical History:   Diagnosis Date    Anticoagulant long-term use     Asthma     Back pain     Bradycardia, unspecified 5/8/2019    The etiology of the bradycardic episode is unclear.  The have appear to be respiratory in origin (at least the 1st episode).  We will continue to monitor carefully.  We are awaiting evaluation by Cardiology.      CAD (coronary artery disease)     s/p stentimg 2003 (2),2009 (1)    Carotid artery stenosis     Diabetes mellitus type 2 in obese     HTN (hypertension), benign     Hyperlipidemia     Keloid cicatrix     NPDR (nonproliferative diabetic retinopathy) 8/17/2015    NSTEMI (non-ST elevated myocardial infarction)     Nuclear sclerosis - Right Eye 3/18/2014    Primary localized  osteoarthrosis, lower leg 2014    Sleep apnea        Past Surgical History:   Procedure Laterality Date    BRONCHOSCOPY N/A 2019    Performed by Sean [facility]ano MD at [facility] OR 2ND FLR    CARDIAC CATHETERIZATION      cataract extraction left eye      cataracts      CATHETERIZATION, HEART, LEFT Left 2014    Performed by Wilman Kim MD at [facility] CATH LAB     SECTION, LOW TRANSVERSE      COLONOSCOPY N/A 2019    Performed by Al Alaniz MD at [facility] ENDO (2ND FLR)    CORONARY ANGIOPLASTY      Creation, Nephrostomy, Percutaneous Left 2019    Performed by Karina Surgeon at [facility] KARINA    CREATION, TRACHEOSTOMY N/A 2019    Performed by Sean Ruano MD at [facility] OR 2ND FLR    EGD, WITH PEG TUBE INSERTION  2019    Performed by Sean Ruano MD at [facility] OR 2ND FLR    ESOPHAGOGASTRODUODENOSCOPY (EGD) N/A 2016    Performed by Gardenia Adamson MD at [facility] ENDO (4TH FLR)    EXCISION TURBINATE, SUBMUCOUS      FUSION, SPINE, LUMBAR, ANTERIOR APPROACH Left L5-S1 Anterior to Psoa Interbody Fusion, L5-S1 Posterior Instrumentation Left 2019    Performed by Mk George MD at [facility] OR 2ND FLR    HAND SURGERY Left     HAND SURGERY Right     torn ligament repair/ Dr. Yeboah    HYSTERECTOMY      Injection,steroid,epidural,transforaminal approach - Bilateral - S1 Bilateral 2018    Performed by Tl Abreu MD at [facility] PAIN MGT    Injection-steroid-epidural-caudal N/A 2019    Performed by Dave Bentley Jr., MD at [facility] PAIN MGT    INSERTION-INTRAOCULAR LENS (IOL) Right 2015    Performed by Good Domingo MD at [facility] OR 1ST FLR    LAPAROTOMY, EXPLORATORY; LIGATION OF LEFT URETER; DOUBLE J STENT REMOVAL LEFT URETER Left 2019    Performed by Paul Carlson MD at [facility] OR 2ND FLR    left foot surgery      left wrist surgery      NASAL SEPTUM SURGERY  5/7/15    PHACOEMULSIFICATION-ASPIRATION-CATARACT Right 2015     Performed by Good [facility]ingo MD at [facility] OR 1ST FLR    REPAIR, URETER  2019    Performed by Mk George MD at [facility] OR 2ND FLR    RESECTION-TURBINATES (SMR) N/A 2015    Performed by Dileep Dubois III, MD at [facility] OR 2ND FLR    rt elbow surgery      S/P LAD COATED STENT  2010    6 total     S/P OM1 STENT  2003    SEPTOPLASTY N/A 2015    Performed by Dileep Dubois III, MD at [facility] OR 2ND FLR    SIGMOIDOSCOPY, FLEXIBLE N/A 2019    Performed by ALBERTO Amin MD at [facility] ENDO (2ND FLR)    SIGMOIDOSCOPY, FLEXIBLE N/A 2019    Performed by ALBERTO Amin MD at [facility] ENDO (2ND FLR)    SINUS SURGERY      F.E.S.S.    SINUS SURGERY FUNCTIONAL ENDOSCOPIC WITH NAVIGATION WITH MAXILLARIES, ETHMOIDS, LEFT SPHENOID, LEFT LOLY N/A 2015    Performed by Dileep Dubois III, MD at [facility] OR 2ND FLR    STENT, URETERAL placement  2019    Performed by Robin Boyd MD at [facility] OR 2ND FLR    TUBAL LIGATION         Review of patient's allergies indicates:  No Known Allergies    Medications:  Continuous Infusions:  Scheduled Meds:   aspirin  81 mg Per G Tube Daily    atorvastatin  80 mg Per G Tube Daily    chlorhexidine  15 mL Mouth/Throat BID    famotidine  20 mg Per G Tube Daily    furosemide  60 mg Intravenous TID    heparin (porcine)  5,000 Units Subcutaneous Q8H    meropenem (MERREM) IVPB  1 g Intravenous Q12H    metoprolol  25 mg Per G Tube BID    rifAMpin (RIFADIN) IVPB  300 mg Intravenous Q12H    scopolamine  1 patch Transdermal Q3 Days    sodium chloride 0.9%  3 mL Intravenous Q8H     PRN Meds:sodium chloride, sodium chloride, acetaminophen, albuterol-ipratropium, Dextrose 10% Bolus, Dextrose 10% Bolus, [] fentaNYL **FOLLOWED BY** fentaNYL, glucagon (human recombinant), insulin aspart U-100, loperamide, magnesium sulfate IVPB, magnesium sulfate IVPB, morphine, nitroGLYCERIN, ondansetron, potassium chloride 10%, potassium chloride 10%, potassium  chloride 10%, potassium, sodium phosphates, potassium, sodium phosphates, potassium, sodium phosphates, promethazine (PHENERGAN) IVPB, sodium chloride 0.9%    Family History     Problem Relation (Age of Onset)    Diabetes Mother, Father, Sister, Brother, Sister    Heart attack Father    Heart disease Mother    Leukemia Father    No Known Problems Sister, Brother, Brother, Maternal Grandmother, Maternal Grandfather, Paternal Grandmother, Paternal Grandfather, Son, Son, Maternal Aunt, Maternal Uncle, Paternal Aunt, Paternal Uncle        Tobacco Use    Smoking status: Never Smoker    Smokeless tobacco: Never Used   Substance and Sexual Activity    Alcohol use: No     Alcohol/week: 0.0 oz    Drug use: No    Sexual activity: Yes     Partners: Male     Birth control/protection: Post-menopausal     Comment:        Review of Systems  Objective:     Vital Signs (Most Recent):  Temp: 98.8 °F (37.1 °C) (06/10/19 0300)  Pulse: 74 (06/10/19 0848)  Resp: 16 (06/10/19 0848)  BP: 124/70 (06/10/19 0404)  SpO2: 100 % (06/10/19 0848) Vital Signs (24h Range):  Temp:  [98.4 °F (36.9 °C)-101.1 °F (38.4 °C)] 98.8 °F (37.1 °C)  Pulse:  [] 74  Resp:  [8-40] 16  SpO2:  [95 %-100 %] 100 %  BP: ()/(53-88) 124/70     Weight: 69.9 kg (154 lb)  Body mass index is 25.63 kg/m².    Review of Symptoms  Symptom Assessment (ESAS 0-10 scale)   ESAS 0 1 2 3 4 5 6 7 8 9 10   Pain   x           Dyspnea   x           Anxiety x             Nausea x             Depression    x           Anorexia x             Fatigue   x           Insomnia x             Restlessness  x             Agitation x             CAM / Delirium _x_ --  ___+   Constipation     x__ --  ___+   Diarrhea           x_ --  ___+  Bowel Management Plan (BMP): Yes    Comments: per MAR    Pain Assessment: pt denies    OME in 24 hours: 0    Performance Status: 40    ECOG Performance Status Grade: 4 - Completely disabled    Physical Exam  Constitutional: She appears  well-developed. She has a sickly appearance. No distress.   HENT:   Head: Normocephalic.   Mouth/Throat: Oropharynx is clear and moist.   Eyes: Pupils are equal, round, and reactive to light. Right eye exhibits normal extraocular motion and no nystagmus. Left eye exhibits normal extraocular motion and no nystagmus.   Cardiovascular: Regular rhythm, normal heart sounds and intact distal pulses. Tachycardia present.   Warm extremities   Pulmonary/Chest: no increased WOB; rales throughout anteriorly  Tracheostomy present.  Thick, yellow secretions   Abdominal: Soft. Bowel sounds are normal. There is no tenderness.   Genitourinary:   Genitourinary Comments: Left nephrostomy tube with clear, sanford output.  Urine catheter with clear, sanford output   Neurological: She is alert. No sensory deficit.   Alert, following request, mouthing words, moving all extremities equally.    Skin: Skin is warm and dry. She is not diaphoretic.   Psychiatric: Her mood appears anxious.   Significant Labs: All pertinent labs within the past 24 hours have been reviewed.  CBC:   Recent Labs   Lab 06/10/19  0453   WBC 8.64   HGB 8.7*   HCT 29.3*   MCV 92        BMP:  Recent Labs   Lab 06/10/19  0453   *   *   K 4.2   *   CO2 28   BUN 39*   CREATININE 1.3   CALCIUM 8.9   MG 2.9*     LFT:  Lab Results   Component Value Date    AST 24 06/10/2019    ALKPHOS 134 06/10/2019    BILITOT 0.5 06/10/2019     Albumin:   Albumin   Date Value Ref Range Status   06/10/2019 1.8 (L) 3.5 - 5.2 g/dL Final     Protein:   Total Protein   Date Value Ref Range Status   06/10/2019 7.3 6.0 - 8.4 g/dL Final     Lactic acid:   Lab Results   Component Value Date    LACTATE 1.8 06/06/2019    LACTATE 0.8 05/13/2019       Significant Imaging: I have reviewed all pertinent imaging results/findings within the past 24 hours.    Advance Care Planning   Advanced Directives::  Living Will: No  LaPOST: No  Do Not Resuscitate Status: No  Medical Power of  :  is surrogate DM    Decision-Making Capacity: Patient answered questions       Living Arrangements: Lives with spouse    Psychosocial/Cultural:  40 years, 2 adult children, 3 grandchildren.        Spiritual:     F- Mariella and Belief: Yazidi    I - Importance: central to family life  .  C - Community: good support but less now than previously    A - Address in Care: offered  visits      > 50% visit spent in chart review, face to face discussion of goals of care,  symptom assessment, coordination of care and emotional support.    Abraham Garcia MD  Palliative Medicine  [facility]

## 2019-06-11 NOTE — ASSESSMENT & PLAN NOTE
--concern for acute on chronic heart failure exacerbation  --TTE on 05/06 with reduced EF 40-45%, down from 53% on echo from 05/09  --new WMA and grade II diastolic dysfunction and moderate mitral regurgitation  --diurese with lasix 60 mg TID  --continue medical management lopressor and losartan   --overall negative 4 L

## 2019-06-11 NOTE — ASSESSMENT & PLAN NOTE
58 y/o female well known to ID with HTN, DMII, CAD, NSTEMI, s/p L5-S1 OLIF on 4/12 complicated by intraoperative left ureteral injury s/p ureteroureteral anastomoses and ureteral stent placement who initially presented on 4/20 with fevers, AMS and intraabdominal abscess, s/p washout and ligation of left ureter with nephrostomy tube placement on 4/21.  She  has had a long and complicated hospital course since that time (see HPI), including tracheostomy and PEG tube placement, bilateral upper and lower extremity DVT, s/p IVC filter placement on 5/29, rectal bleeding, rectal ulcer.  She is currently on long term antibiotic therapy for E coli bacteremia and Staph lugdenesis infection (abdominal source) with plan for subsequent suppressive therapy given concern for hardware involvement (end date 6/18/19).  Was treated for HAP (Pseudomonas) and candida glabrata UTI I late May.        ID  Was reconsulted again on 6/2 for recurrent fever.  Workup was unremarkable at that time, she had no recurrence of fever, and she was discharged to rehab on 6/5/19.  Fevers subsequently recurred at rehab and she was sent to ED on 6/6 for repeat imaging.  She was stable on arrival to [facility], but while in ED she became acutely SOB with hypoxemia, became hypotensive.  Was intubated.  Noted to have significant volume overload on CXR, troponins elevated, NSTEMI.  Antibiotics were broadened to vanc and cefepime.  ID re-consulted for antibiotic recommendations.      CTA was negative for PE.  Noted to have bilateral patchy ground glass opacification throughout the lungs; bilateral basilar consolidation overall improved from prior CTA on 5/8; moderate bilateral pleural effusions. Noted to have  persistent fluid collection along the inferior aspect of her healing midline abdominal incision - 3.6 X 8.2 X 2.5 cm - with surrounding inflammatory changes.       Micro:  Repeat blood cultures NGTD   Mini BAL - pseudomonas  U/A - 27 WBC, no bacteria; urine  culture negative    Afebrile today. Leukocytosis resolved.   Fontenot has been exchanged. Nephrostomy tube evaluated. Stitch added.    Blood cultures - NGTD  Respiratory cultures pseudomonas - Intermediate to cefepime and Cipro otherwise sensitive - sensitive to meropenem  CT abd: There is a healing midline abdominal incision with persistent fluid collection along the inferior aspect of the incision measuring 3.6 x 8.2 x 2.5 cm demonstrating surrounding inflammatory changes as well as increased BL pleural effusions.     Stable non septic  Plan  1.  vancomycin d/c'd  2.  Continue  Meroepenem but increase to 2g IVq8h as renal function normalized and treating pseudomonas - plan for 5days for presumed PNA  3.  Continue rifampin  mg twice a day.   4.  Recommend IR evaluation to assess if abdominal fluid collection is amenable to drain and BL pleural effusions and send for cultures.   5.  If fevers persist, recommend removing PICC line.   6. Will needs suppression with Cefadroxil 1g po bid and rifampin 300mg bid thru G tube once meropenem completed  7. ID will follow.  8. Discussed with primary team

## 2019-06-11 NOTE — PROGRESS NOTES
[facility]  Infectious Disease  Progress Note    Patient Name: Mariann Huff  MRN: [id number]  Admission Date: 6/6/2019  Length of Stay: 5 days  Attending Physician: Paul Molina MD  Primary Care Provider: Jasbir Haney MD    Isolation Status: No active isolations  Assessment/Plan:      Fever-resolved as of 6/9/2019     60 y/o female well known to ID with HTN, DMII, CAD, NSTEMI, s/p L5-S1 OLIF on 4/12 complicated by intraoperative left ureteral injury s/p ureteroureteral anastomoses and ureteral stent placement who initially presented on 4/20 with fevers, AMS and intraabdominal abscess, s/p washout and ligation of left ureter with nephrostomy tube placement on 4/21.  She  has had a long and complicated hospital course since that time (see HPI), including tracheostomy and PEG tube placement, bilateral upper and lower extremity DVT, s/p IVC filter placement on 5/29, rectal bleeding, rectal ulcer.  She is currently on long term antibiotic therapy for E coli bacteremia and Staph lugdenesis infection (abdominal source) with plan for subsequent suppressive therapy given concern for hardware involvement (end date 6/18/19).  Was treated for HAP (Pseudomonas) and candida glabrata UTI I late May.        ID  Was reconsulted again on 6/2 for recurrent fever.  Workup was unremarkable at that time, she had no recurrence of fever, and she was discharged to rehab on 6/5/19.  Fevers subsequently recurred at rehab and she was sent to ED on 6/6 for repeat imaging.  She was stable on arrival to [facility] [place], but while in ED she became acutely SOB with hypoxemia, became hypotensive.  Was intubated.  Noted to have significant volume overload on CXR, troponins elevated, NSTEMI.  Antibiotics were broadened to vanc and cefepime.  ID re-consulted for antibiotic recommendations.      CTA was negative for PE.  Noted to have bilateral patchy ground glass opacification throughout the lungs; bilateral basilar consolidation overall  improved from prior CTA on 5/8; moderate bilateral pleural effusions. Noted to have  persistent fluid collection along the inferior aspect of her healing midline abdominal incision - 3.6 X 8.2 X 2.5 cm - with surrounding inflammatory changes.       Micro:  Repeat blood cultures NGTD   Mini BAL - pseudomonas  U/A - 27 WBC, no bacteria; urine culture negative    Afebrile today. Leukocytosis resolved.   Fontenot has been exchanged. Nephrostomy tube evaluated. Stitch added.    Blood cultures - NGTD  Respiratory cultures pseudomonas - Intermediate to cefepime and Cipro otherwise sensitive - sensitive to meropenem  CT abd: There is a healing midline abdominal incision with persistent fluid collection along the inferior aspect of the incision measuring 3.6 x 8.2 x 2.5 cm demonstrating surrounding inflammatory changes as well as increased BL pleural effusions.     Stable non septic  Plan  1.  vancomycin d/c'd  2.  Continue  Meroepenem but increase to 2g IVq8h as renal function normalized and treating pseudomonas - plan for 5days for presumed PNA  3.  Continue rifampin  mg twice a day.   4.  Recommend IR evaluation to assess if abdominal fluid collection is amenable to drain and BL pleural effusions and send for cultures.   5.  If fevers persist, recommend removing PICC line.   6. Will needs suppression with Cefadroxil 1g po bid and rifampin 300mg bid thru G tube once meropenem completed  7. ID will follow.  8. Discussed with primary team            Anticipated Disposition: tbd    Thank you for your consult. I will follow-up with patient. Please contact us if you have any additional questions.    POLLY Jerome  Infectious Disease  [facility]    Subjective:     Principal Problem:Acute on chronic respiratory failure with hypoxia    HPI:   Ms. Huff is a 58 y/o female, known to ID throughout prolonged hospital admission,  with HTN, DMII, CAD, NSTEMI, s/p L5-S1 OLIF with NSGY 4/12, complicated by  a left ureter transection, repaired with ureteroureteral anastomoses and ureteral stent placement.   She was discharged with a correa and MADHURI drain that was removed on 4/16.  She was seen by urology and anticipated stent removal in 2-3 months (6/2019).  She presented to ED on 4/20/19  with AMS and sepsis.   An MRI at that time showed postsurgical change of L5-S1 posterior spinal fusion and anterior interbody fusion and a 4.4 cm fluid collection in the left anterior prevertebral soft tissues at the level of the L5-S1 disc space.  A CT showed air collection within the anterior paraspinous soft tissues through which the left ureter courses. Given that the ureter courses through this, communication of the ureter with this collection was not excluded.   She was taken to OR for washout and she underwent ex-lap of left ureter and left nephrostomy tube placement 4/21/19.   Blood cultures were positive for E coli.  Urine cultures +E.coli and OR cultures were + for Staph lugdenensis (pan sensitive).   ID was consulted at that time (see note of 4/22).  There was concern for hardware involvement and she was ultimately treated with ceftriaxone and rifampin with plan for 6 weeks of IV antibiotics (end date 6/4/19) with plan for oral suppressive therapy thereafter.         ID was reconsulted on 4/29/16 for fevers and leukocytosis.   Patient was broadened to Vanc, CTX and Rifampin, and later vanc was stopped.  Source of fevers and leukocytosis were unclear, though abdominal source was suspected.  Blood cultures were negative, CT abd showed a superficial fluid collection, C diff was negative, lines were changed, BAL was negative. She was noted to have a partially occluded thrombus in the RIJ and proximal subclavian.  Drug reaction was considered.  Patient improved and leukocytosis and fever resolved.    ID signed off with plans to complete Cefazolin and rifampin for 6 weeks through 6/4.     ID was again re-consulted on 5/20 for  recurrent fever and leukocytosis.  Abdominal drain noted to be draining pus, she had some rectal bleeding, and CXR showed new left basilar consolidation concerning for HAP.  Endotracheal aspirate showed Pseudomonas. Blood cultures negative. Urine showed candida glabrata.  Antibiotics were broadened to Vancomycin, cefepime, rifampin and fluconazole and lines were exchanged.  A repeat CT abd showed persistent subcutaneous fluid collection noted to be larger, but decreased fat stranding.  She was treated with 7 day course of Cefepime for HAP, a 7 day course of high dose fluconazole for candida glabrata and then transitioned back to cefazolin plus rifampin for prior E Coli and Staph lugdenensis and duration was extended to 8 week with end date of 6/18.      ID  reconsulted again on 6/2 for recurrent fever.  Workup was unremarkable at that time, she had no recurrence of fever, and she was discharged to rehab on 6/5/19.      Fevers subsequently recurred at rehab and she was sent to ED for repeat imaging.  She was stable on arrival to [facility], but while in ED she became acutely SOB with hypoxemia, became hypotensive.  Was intubated.  Noted to have significant volume overload on CXR.  Troponins elevated.      CTA was negative for PE.  Noted to have bilateral patch ground glass opacification throughout the lungs; bilateral basilar consolidation overall improved from prior CTA on 5/8; moderate bilateral pleural effusions; and persistent fluid collection along the inferor aspect of her healing midline abdominal incision - 3.6 X 8.2 X 2.5 cm - with surrounding inflammatory changes.       Micro:  Repeat blood cultures NGTD   Mini BAL - gram stain negative, culture pending  U/A - 27 WBC, no bacteria; urine culture pending  Interval History: No AEON.  Afebrile and WBC WNL. Resp cx shows pseudomonas sensitive to meropenem.  Resp therapy says patients sputum thin and clear  Blood cultures NGTD.  Having 3 mushy BMs daily.  The patient  denies any recent abd pain, SOB, fever, chills, or sweats.      Review of Systems   Constitutional: Negative for activity change, chills, diaphoresis and fever.   Respiratory: Negative for cough, shortness of breath and wheezing.         + increased sputum - now improved     Cardiovascular: Negative for chest pain.   Gastrointestinal: Negative for abdominal pain, constipation, diarrhea, nausea and vomiting.   Genitourinary: Negative for dysuria, frequency and urgency.   Neurological: Negative for dizziness.   Hematological: Does not bruise/bleed easily.     Objective:     Vital Signs (Most Recent):  Temp: 98.7 °F (37.1 °C) (06/11/19 1643)  Pulse: 80 (06/11/19 1830)  Resp: 18 (06/11/19 1830)  BP: (!) 155/72 (06/11/19 1830)  SpO2: 100 % (06/11/19 1830) Vital Signs (24h Range):  Temp:  [98.5 °F (36.9 °C)-99 °F (37.2 °C)] 98.7 °F (37.1 °C)  Pulse:  [63-91] 80  Resp:  [12-41] 18  SpO2:  [78 %-100 %] 100 %  BP: ()/(55-85) 155/72     Weight: 69.9 kg (154 lb)  Body mass index is 25.63 kg/m².    Estimated Creatinine Clearance: 66.9 mL/min (based on SCr of 0.9 mg/dL).    Physical Exam   Constitutional: She is oriented to person, place, and time. She appears well-developed and well-nourished. No distress (appears comfortable).   HENT:   Head: Normocephalic and atraumatic.   Tracheostomy in place - no drainage/erythema noted   Eyes: Conjunctivae are normal. Right eye exhibits no discharge. Left eye exhibits no discharge. No scleral icterus.   Cardiovascular: Normal rate and regular rhythm.   Pulmonary/Chest: No stridor. No tachypnea. No respiratory distress. She has no wheezes. She has no rhonchi. She has rales (bases).   Crackles at bases     Abdominal: Soft. She exhibits no distension and no mass. There is no tenderness. There is no guarding.   Midline surgical  incision healed - c/d/i.   PEG site clear   Genitourinary:   Genitourinary Comments: Nephrostomy tube in place, leaking around insertion site.  No purulence  noted.      Fontenot to gravity - urine orange tinged   Musculoskeletal: Normal range of motion. She exhibits no edema or tenderness.   Neurological: She is alert and oriented to person, place, and time.   Skin: Skin is warm and dry. No rash noted. She is not diaphoretic.   PICC RUE - site clear, non-tender   Psychiatric: She has a normal mood and affect. Her behavior is normal.   Nursing note and vitals reviewed.      Significant Labs:   Blood Culture:   Recent Labs   Lab 05/20/19  1306 06/01/19  1712 06/01/19  1713 06/06/19  1333 06/06/19  1346   LABBLOO No growth after 5 days. No growth after 5 days. No growth after 5 days. No growth after 5 days. No growth after 5 days.     CBC:   Recent Labs   Lab 06/10/19  0453 06/11/19  0355   WBC 8.64 8.13   HGB 8.7* 9.3*   HCT 29.3* 30.8*    287     CMP:   Recent Labs   Lab 06/10/19  0453 06/11/19  0355   * 148*   K 4.2 3.5   * 109   CO2 28 28   * 169*   BUN 39* 42*   CREATININE 1.3 0.9   CALCIUM 8.9 9.2   PROT 7.3 7.4   ALBUMIN 1.8* 1.8*   BILITOT 0.5 0.5   ALKPHOS 134 128   AST 24 18   ALT 10 7*   ANIONGAP 10 11   EGFRNONAA 45.3* >60.0     Respiratory Culture:   Recent Labs   Lab 04/30/19  0900 05/08/19  1300 05/19/19  1130 06/06/19 2012   GSRESP <10 epithelial cells per low power field.  No WBC's  No organisms seen Rare WBC's  No organisms seen Few WBC's  Few Gram negative rods <10 epithelial cells per low power field.  Few WBC's   No organisms seen   RESPIRATORYC  --  No growth PSEUDOMONAS AERUGINOSA  Many   No S aureus isolated.  PSEUDOMONAS AERUGINOSA  Few       All pertinent labs within the past 24 hours have been reviewed.    Significant Imaging: I have reviewed all pertinent imaging results/findings within the past 24 hours.   X-Ray Chest AP Portable [[id number]] Resulted: 06/10/19 0910   Order Status: Completed Updated: 06/10/19 0912   Narrative:     EXAMINATION:  XR CHEST AP PORTABLE    CLINICAL HISTORY:  pna;    TECHNIQUE:  Single  frontal portable view of the chest was performed.    COMPARISON:  06/06/2019    FINDINGS:  Tracheostomy cannula remains in place with its tip in satisfactory position.  Right-sided PICC again seen with its tip at the cavoatrial junction.  Cardiomegaly, unchanged.  Continued pulmonary vascular congestion and interstitial/alveolar edema.  There may have been a little improvement in aeration at the right lower lung zone.  No pneumothorax or other detrimental change.   Impression:       As above      Electronically signed by: Denzel Doherty MD  Date: 06/10/2019  Time: 09:10   US Upper Extremity Veins Right [[id number]] (Abnormal) Resulted: 06/07/19 0317   Order Status: Completed Updated: 06/07/19 0319   Narrative:     EXAMINATION:  US UPPER EXTREMITY VEINS LEFT; US UPPER EXTREMITY VEINS RIGHT    CLINICAL HISTORY:  swelling;; pain/swelling;    TECHNIQUE:  Duplex and color flow Doppler evaluation and dynamic compression was performed of the bilateral upper extremity veins.    COMPARISON:  Ultrasound 05/12/2019    FINDINGS:  Right central veins: Central venous catheter identified within the axillary vein.  The internal jugular, subclavian, and axillary veins are patent and free of thrombus.    Right arm veins: Central venous catheter identified within the basilic vein.  There is thrombosis of the cephalic vein.  The brachial, and basilic veins are patent and compressible.    Left central veins: Persistent occlusive thrombus within the axillary vein.  The internal jugular and subclavian veins are patent and free of thrombus.    Left arm veins: Persistent occlusive thrombus within the basilic vein.  The brachial and cephalic veins are patent and compressible.   Impression:       1. Persistent occlusive deep vein thrombosis of the left axillary and basilic veins.  2. Superficial thrombophlebitis of the right cephalic vein.  3. Central venous catheter identified extending from the right basilic vein into the right axillary  vein.  This report was flagged in Epic as abnormal.    Electronically signed by resident: Adrián Becker  Date: 06/07/2019  Time: 03:03    Electronically signed by: Pancho Sheikh MD  Date: 06/07/2019  Time: 03:17   US Upper Extremity Veins Left [[id number]] (Abnormal) Resulted: 06/07/19 0317   Order Status: Completed Updated: 06/07/19 0319   Narrative:     EXAMINATION:  US UPPER EXTREMITY VEINS LEFT; US UPPER EXTREMITY VEINS RIGHT    CLINICAL HISTORY:  swelling;; pain/swelling;    TECHNIQUE:  Duplex and color flow Doppler evaluation and dynamic compression was performed of the bilateral upper extremity veins.    COMPARISON:  Ultrasound 05/12/2019    FINDINGS:  Right central veins: Central venous catheter identified within the axillary vein.  The internal jugular, subclavian, and axillary veins are patent and free of thrombus.    Right arm veins: Central venous catheter identified within the basilic vein.  There is thrombosis of the cephalic vein.  The brachial, and basilic veins are patent and compressible.    Left central veins: Persistent occlusive thrombus within the axillary vein.  The internal jugular and subclavian veins are patent and free of thrombus.    Left arm veins: Persistent occlusive thrombus within the basilic vein.  The brachial and cephalic veins are patent and compressible.   Impression:       1. Persistent occlusive deep vein thrombosis of the left axillary and basilic veins.  2. Superficial thrombophlebitis of the right cephalic vein.  3. Central venous catheter identified extending from the right basilic vein into the right axillary vein.  This report was flagged in Epic as abnormal.    Electronically signed by resident: Adrián Becker  Date: 06/07/2019  Time: 03:03    Electronically signed by: Pancho Sheikh MD  Date: 06/07/2019  Time: 03:17   US Lower Extremity Veins Bilateral [[id number]] (Abnormal) Resulted: 06/07/19 0312   Order Status: Completed Updated: 06/07/19 0314   Narrative:      EXAMINATION:  US LOWER EXTREMITY VEINS BILATERAL    CLINICAL HISTORY:  pain/sweling;    TECHNIQUE:  Duplex and color flow Doppler and dynamic compression was performed of the bilateral lower extremity veins was performed.    COMPARISON:  Ultrasound 05/29/2019, 05/08/2019    FINDINGS:  Right thigh veins: Partially occlusive thrombus within the common femoral vein with completely occlusive thrombus within the superficial femoral vein extending into the popliteal vein.  The deep femoral, and upper greater saphenous veins are patent and free of thrombus.    Right calf veins: There is occlusive thrombus within the posterior tibial veins and peroneal veins.  The anterior tibial veins are patent.    Left thigh veins: Partially occlusive thrombus within the common femoral vein near the femoral saphenous junction extending into the proximal superficial femoral vein.  The popliteal, upper greater saphenous, and deep femoral veins are patent and free of thrombus.    Left calf veins: There is thrombosis of one of the paired anterior tibial veins, similar to prior.  The posterior tibial veins and peroneal veins are patent.   Impression:       1. Extension of known thrombus within the right lower extremity veins, now extending from the common femoral vein into the superficial femoral, popliteal, posterior tibial, and peroneal veins.  2. Partially occlusive thrombus within the left common femoral vein extending into the proximal superficial femoral vein, not present on the most recent prior ultrasound.  3. Persistent thrombosis of one of the paired left anterior tibial veins.  This report was flagged in Epic as abnormal.    Electronically signed by resident: Adrián Becker  Date: 06/07/2019  Time: 02:54    Electronically signed by: Pancho Sheikh MD  Date: 06/07/2019  Time: 03:12   CT Abdomen Pelvis With Contrast [[id number]] (Abnormal) Resulted: 06/07/19 0131   Order Status: Completed Updated: 06/07/19 0133   Narrative:      EXAMINATION:  CTA CHEST NON CORONARY; CT ABDOMEN PELVIS WITH CONTRAST    CLINICAL HISTORY:  abrupt onset shortness of breath;; bacteremia;    TECHNIQUE:  The patient was surveyed from the lung apices through the pelvis after the administration of 100 cc Omni 350 IV contrast as well as oral contrast and data was reconstructed for coronal, sagittal, and axial images.  Contrast timing was optimized to evaluate the pulmonary arteries.  MIP images were performed.    COMPARISON:  Radiograph 06/06/2019, 06/02/2019, 05/29/2019; CTA 05/22/2019, 05/08/2019; CT 05/01/2019.    FINDINGS:  Examination of the vascular and soft tissue structures at the base of the neck demonstrate a stable 1.4 cm in hypodensity in the left thyroid lobe.  A right-sided central venous catheter is in place with tip terminating in the superior vena cava.    Left sided aortic arch.  The thoracic aorta maintains normal caliber, contour, and course with mild atherosclerotic calcification within its course.  The heart is not enlarged.  There is calcific atherosclerosis of the coronary arteries.  Minimal pericardial effusion, unchanged.    The pulmonary arteries distribute normally. This study is adequate for the evaluation of pulmonary thromboembolism. There is no filling defect of the pulmonary arteries to suggest pulmonary thromboembolism.    A tracheostomy cannula is in place.  The trachea is midline, the proximal airways are patent, and the lungs are symmetrically expanded.  Patchy ground-glass opacification is present throughout the lungs.  There are consolidative opacities of the lung bases bilaterally, left greater than right, overall improved when compared to CTA 05/08/2019.  No pneumothorax.  There are moderate-sized pleural effusions bilaterally, slightly enlarged when compared to CTA 05/08/2019.    No axillary or mediastinal lymph node enlargement is seen.    The esophagus maintains a normal course and caliber.    The liver is enlarged  measuring 22.1 cm.  No focal hepatic abnormality is seen.  The gallbladder is unremarkable.  No intrahepatic or extrahepatic biliary ductal dilatation is noted.  Portal vein, splenic vein, and SMV are patent.    There is a percutaneous gastrostomy tube in place.  The stomach, pancreas, and adrenal glands are unremarkable.  There are multiple hypoattenuating lesions throughout the spleen, similar to CTA 05/22/2019.    The kidneys are normal in size and location.  Percutaneous left nephrostomy tube remains in stable position within the left lower pole.  No hydronephrosis is seen.  There are stable left renal cysts and additional bilateral subcentimeter renal hypodensities likely representing additional cysts.  Postsurgical changes of left ureteral repair are again noted.  The right ureter appears normal in course and caliber without evidence of ureteral dilatation. The urinary bladder is decompressed around a Fontenot catheter.  The uterus is surgically absent.    No evidence of bowel inflammation or obstruction. No ascites, free fluid, or intraperitoneal free air is identified.    There is no evidence of lymph node enlargement in the abdomen or pelvis.  The abdominal aorta is normal in course and caliber with significant atherosclerotic calcifications.  An IVC filter is in place.    The osseus structures demonstrate postsurgical changes of L5-S1 posterior fusion with disc spacer in place at this level.  There are degenerative changes without evidence of acute fracture or osseus destructive process.    There is a healing midline abdominal incision with persistent fluid collection along the inferior aspect of the incision measuring 3.6 x 8.2 x 2.5 cm demonstrating surrounding inflammatory changes.  There is anasarca.   Impression:       1. No pulmonary thromboembolism identified.  2. Patchy ground-glass opacification throughout the lungs compatible with pulmonary edema versus nonspecific infectious/inflammatory  process.  3. Pulmonary consolidation overall improved when compared to CTA 05/08/2019 with persistent opacities the lung bases which may reflect atelectasis, aspiration, or pneumonia.  4. Moderate-sized pleural effusions bilaterally, mildly enlarged when compared to CTA 05/08/2019.  Minimal pericardial effusion, unchanged.  5. Left-sided percutaneous nephrostomy tube in stable position.  No hydronephrosis.  6. Persistent fluid collection within the anterior abdominal wall along the inferior aspect of the midline abdominal incision which is nonspecific and may represent hematoma, seroma, or abscess.  7. Stable hypoattenuating lesions throughout the spleen which are nonspecific and may represent hematoma, infarct, or less likely abscess.  8. Anasarca.  9. Interval placement of IVC filter.  10. Multiple additional findings as detailed above.  This report was flagged in Epic as abnormal.    Electronically signed by resident: Adrián Becker  Date: 06/07/2019  Time: 00:30    Electronically signed by: Pancho Sheikh MD  Date: 06/07/2019  Time: 01:31   CTA Chest Non-Coronary - PE Study [[id number]] (Abnormal) Resulted: 06/07/19 0131   Order Status: Completed Updated: 06/07/19 0133   Narrative:     EXAMINATION:  CTA CHEST NON CORONARY; CT ABDOMEN PELVIS WITH CONTRAST    CLINICAL HISTORY:  abrupt onset shortness of breath;; bacteremia;    TECHNIQUE:  The patient was surveyed from the lung apices through the pelvis after the administration of 100 cc Omni 350 IV contrast as well as oral contrast and data was reconstructed for coronal, sagittal, and axial images.  Contrast timing was optimized to evaluate the pulmonary arteries.  MIP images were performed.    COMPARISON:  Radiograph 06/06/2019, 06/02/2019, 05/29/2019; CTA 05/22/2019, 05/08/2019; CT 05/01/2019.    FINDINGS:  Examination of the vascular and soft tissue structures at the base of the neck demonstrate a stable 1.4 cm in hypodensity in the left thyroid lobe.  A  right-sided central venous catheter is in place with tip terminating in the superior vena cava.    Left sided aortic arch.  The thoracic aorta maintains normal caliber, contour, and course with mild atherosclerotic calcification within its course.  The heart is not enlarged.  There is calcific atherosclerosis of the coronary arteries.  Minimal pericardial effusion, unchanged.    The pulmonary arteries distribute normally. This study is adequate for the evaluation of pulmonary thromboembolism. There is no filling defect of the pulmonary arteries to suggest pulmonary thromboembolism.    A tracheostomy cannula is in place.  The trachea is midline, the proximal airways are patent, and the lungs are symmetrically expanded.  Patchy ground-glass opacification is present throughout the lungs.  There are consolidative opacities of the lung bases bilaterally, left greater than right, overall improved when compared to CTA 05/08/2019.  No pneumothorax.  There are moderate-sized pleural effusions bilaterally, slightly enlarged when compared to CTA 05/08/2019.    No axillary or mediastinal lymph node enlargement is seen.    The esophagus maintains a normal course and caliber.    The liver is enlarged measuring 22.1 cm.  No focal hepatic abnormality is seen.  The gallbladder is unremarkable.  No intrahepatic or extrahepatic biliary ductal dilatation is noted.  Portal vein, splenic vein, and SMV are patent.    There is a percutaneous gastrostomy tube in place.  The stomach, pancreas, and adrenal glands are unremarkable.  There are multiple hypoattenuating lesions throughout the spleen, similar to CTA 05/22/2019.    The kidneys are normal in size and location.  Percutaneous left nephrostomy tube remains in stable position within the left lower pole.  No hydronephrosis is seen.  There are stable left renal cysts and additional bilateral subcentimeter renal hypodensities likely representing additional cysts.  Postsurgical changes of  left ureteral repair are again noted.  The right ureter appears normal in course and caliber without evidence of ureteral dilatation. The urinary bladder is decompressed around a Fontenot catheter.  The uterus is surgically absent.    No evidence of bowel inflammation or obstruction. No ascites, free fluid, or intraperitoneal free air is identified.    There is no evidence of lymph node enlargement in the abdomen or pelvis.  The abdominal aorta is normal in course and caliber with significant atherosclerotic calcifications.  An IVC filter is in place.    The osseus structures demonstrate postsurgical changes of L5-S1 posterior fusion with disc spacer in place at this level.  There are degenerative changes without evidence of acute fracture or osseus destructive process.    There is a healing midline abdominal incision with persistent fluid collection along the inferior aspect of the incision measuring 3.6 x 8.2 x 2.5 cm demonstrating surrounding inflammatory changes.  There is anasarca.   Impression:       1. No pulmonary thromboembolism identified.  2. Patchy ground-glass opacification throughout the lungs compatible with pulmonary edema versus nonspecific infectious/inflammatory process.  3. Pulmonary consolidation overall improved when compared to CTA 05/08/2019 with persistent opacities the lung bases which may reflect atelectasis, aspiration, or pneumonia.  4. Moderate-sized pleural effusions bilaterally, mildly enlarged when compared to CTA 05/08/2019.  Minimal pericardial effusion, unchanged.  5. Left-sided percutaneous nephrostomy tube in stable position.  No hydronephrosis.  6. Persistent fluid collection within the anterior abdominal wall along the inferior aspect of the midline abdominal incision which is nonspecific and may represent hematoma, seroma, or abscess.  7. Stable hypoattenuating lesions throughout the spleen which are nonspecific and may represent hematoma, infarct, or less likely abscess.  8.  Anasarca.  9. Interval placement of IVC filter.  10. Multiple additional findings as detailed above.  This report was flagged in Epic as abnormal.    Electronically signed by resident: Adrián Becker  Date: 06/07/2019  Time: 00:30    Electronically signed by: Pancho Sheikh MD  Date: 06/07/2019  Time: 01:31   X-Ray Abdomen AP 1 View [[id number]] Resulted: 06/06/19 2224   Order Status: Completed Updated: 06/06/19 2226   Narrative:     EXAMINATION:  XR ABDOMEN AP 1 VIEW    CLINICAL HISTORY:  evaluate nephrostomy tube placement;    TECHNIQUE:  AP View(s) of the abdomen was performed.    COMPARISON:  None    FINDINGS:  Two supine views the abdomen presented.  There is a pigtail catheter overlying the left abdomen consistent with percutaneous nephrostomy.    There is L5-S1 spinal fusion hardware.  There appears to be a gastrostomy tube projecting in left upper quadrant.  The bowel gas pattern appears normal.  No pneumatosis or free air.  There is mild reticular opacities at the lung bases.  There is an IVC filter noted.  No fracture or calculus found.   Impression:       Normal bowel gas pattern.      Electronically signed by: Chad Jewell  Date: 06/06/2019  Time: 22:24   X-Ray Chest AP Portable [[id number]] Resulted: 06/06/19 1418   Order Status: Completed Updated: 06/06/19 1420   Narrative:     EXAMINATION:  XR CHEST AP PORTABLE    CLINICAL HISTORY:  Sepsis;    TECHNIQUE:  Single frontal view of the chest was performed.    COMPARISON:  Non 06/06/2019 e    FINDINGS:  Support devices remain.  Ill-defined patchy airspace consolidation and/or edema bilaterally identified without significant changes.  No significant pleural effusion.   Impression:       See above      Electronically signed by: Christian Valencia MD  Date: 06/06/2019  Time: 14:18

## 2019-06-11 NOTE — PLAN OF CARE
Palliative Care:    Attempted visit to continue providing emotional support and establishing rapport to pt. Pt was placed back on ventilator overnight. Pt asleep when visited with no family.  CCS plan is for pt to go to LTAC when appropriate.    [facility] to continue to follow to provide emotional support.      Anastacia Carrion, FRED, ACHP-SW

## 2019-06-11 NOTE — PLAN OF CARE
Sent referrals to  [facility] E-Referral     [facility] E-Referral     [facility] E-Referral     [facility]

## 2019-06-11 NOTE — SUBJECTIVE & OBJECTIVE
Interval History/Significant Events: No acute events overnight. Placed on mechanical ventilation overnight.       Review of Systems   Respiratory: Negative for shortness of breath.    Cardiovascular: Negative for chest pain.   Gastrointestinal: Negative for abdominal pain, nausea and vomiting.     Objective:     Vital Signs (Most Recent):  Temp: 98.8 °F (37.1 °C) (06/11/19 1100)  Pulse: 77 (06/11/19 1430)  Resp: (!) 29 (06/11/19 1430)  BP: 128/68 (06/11/19 1430)  SpO2: (!) 91 % (06/11/19 1430) Vital Signs (24h Range):  Temp:  [98.5 °F (36.9 °C)-99 °F (37.2 °C)] 98.8 °F (37.1 °C)  Pulse:  [63-85] 77  Resp:  [14-41] 29  SpO2:  [78 %-100 %] 91 %  BP: ()/(55-83) 128/68   Weight: 69.9 kg (154 lb)  Body mass index is 25.63 kg/m².      Intake/Output Summary (Last 24 hours) at 6/11/2019 1447  Last data filed at 6/11/2019 1400  Gross per 24 hour   Intake 2596 ml   Output 1675 ml   Net 921 ml       Physical Exam   Constitutional: She appears well-developed. She has a sickly appearance. No distress.   HENT:   Head: Normocephalic.   Mouth/Throat: Oropharynx is clear and moist.   Eyes: Pupils are equal, round, and reactive to light. Conjunctivae and EOM are normal. Right eye exhibits normal extraocular motion and no nystagmus. Left eye exhibits normal extraocular motion and no nystagmus.   Cardiovascular: Normal rate, regular rhythm, normal heart sounds and intact distal pulses.   No murmur heard.  Pulses:       Radial pulses are 2+ on the right side, and 2+ on the left side.        Dorsalis pedis pulses are 2+ on the right side, and 2+ on the left side.   Warm extremities, no peripheral edema   Pulmonary/Chest: No accessory muscle usage. No tachypnea. No respiratory distress. She has decreased breath sounds in the right lower field and the left lower field. She has no wheezes. She has rales (bilaterally) in the right lower field and the left lower field.   6 shiley tracheostomy. On mechanical ventilation   Abdominal:  Soft. Bowel sounds are normal. There is no tenderness.       Genitourinary:   Genitourinary Comments: Left nephrostomy tube and urine catheter   Neurological: She is alert. No sensory deficit.   Alert, following request, mouthing words, moving all extremities equally.    Skin: Skin is warm and dry. She is not diaphoretic.        Nursing note and vitals reviewed.      Vents:  Vent Mode: Spont (06/11/19 1105)  Ventilator Initiated: Yes (06/06/19 1416)  Set Rate: 16 bmp (06/11/19 0730)  Vt Set: 400 mL (06/11/19 0730)  Pressure Support: 10 cmH20 (06/11/19 1105)  PEEP/CPAP: 5 cmH20 (06/11/19 1105)  Oxygen Concentration (%): 30 (06/11/19 1400)  Peak Airway Pressure: 15 cmH2O (06/11/19 1105)  Plateau Pressure: 21 cmH20 (06/11/19 1105)  Total Ve: 9.28 mL (06/11/19 1105)  F/VT Ratio<105 (RSBI): (!) 66.47 (06/11/19 1105)  Lines/Drains/Airways     Peripherally Inserted Central Catheter Line                 PICC Double Lumen 05/24/19 0900 right brachial 18 days          Drain                 Nephrostomy 04/21/19 0629 Left 8 Fr. 51 days         Gastrostomy/Enterostomy 05/02/19 1134 Percutaneous endoscopic gastrostomy (PEG) LUQ decompression;feeding 40 days         Urethral Catheter 06/06/19 1835 16 Fr. 4 days          Airway                 Surgical Airway 06/03/19 1234 Shiley Cuffed 8 days              Significant Labs:    CBC/Anemia Profile:  Recent Labs   Lab 06/10/19  0453 06/11/19  0355   WBC 8.64 8.13   HGB 8.7* 9.3*   HCT 29.3* 30.8*    287   MCV 92 91   RDW 15.8* 15.4*        Chemistries:  Recent Labs   Lab 06/10/19  0453 06/11/19  0355   * 148*   K 4.2 3.5   * 109   CO2 28 28   BUN 39* 42*   CREATININE 1.3 0.9   CALCIUM 8.9 9.2   ALBUMIN 1.8* 1.8*   PROT 7.3 7.4   BILITOT 0.5 0.5   ALKPHOS 134 128   ALT 10 7*   AST 24 18   MG 2.9* 2.1   PHOS 3.2 2.3*       All pertinent labs within the past 24 hours have been reviewed.    Significant Imaging:  I have reviewed and interpreted all pertinent imaging  results/findings within the past 24 hours.

## 2019-06-11 NOTE — PROGRESS NOTES
"[facility]  Adult Nutrition  Progress Note    SUMMARY       Recommendations  Recommendation/Intervention:   Current TF providing < 75% EEN, recommend modifying to Impact Peptide 1.5 at 45 mL/hr - to provide 1620 kcal/day, 102g protein/day, and 832mL free fluid/day.   RD to monitor.    Goals: Patient to receive nutrition by RD follow-up  Nutrition Goal Status: goal met  Communication of RD Recs: reviewed with RN    Reason for Assessment  Reason For Assessment: RD follow-up  Diagnosis: (respiratory failure)  Relevant Medical History: CAD, DM2, HTN, HLD  General Information Comments: Trach/vent. Was on trach collar some yesterday. Tolerating TF at goal rate. Patient continues with mild muscle wasting associated with decreased mobility, does not meet criteria for malnturition.  Nutrition Discharge Planning: Adequate nutrition via TF.    Nutrition Risk Screen  Nutrition Risk Screen: tube feeding or parenteral nutrition    Nutrition/Diet History  Spiritual, Cultural Beliefs, Protestant Practices, Values that Affect Care: no  Factors Affecting Nutritional Intake: NPO, on mechanical ventilation    Anthropometrics  Temp: 98.8 °F (37.1 °C)  Height Method: Estimated  Height: 5' 5" (165.1 cm)  Height (inches): 65 in  Weight Method: Bed Scale  Weight: 69.9 kg (154 lb)  Weight (lb): 154 lb  Ideal Body Weight (IBW), Female: 125 lb  % Ideal Body Weight, Female (lb): 123.2 lb  BMI (Calculated): 25.7  BMI Grade: 25 - 29.9 - overweight    Lab/Procedures/Meds  Pertinent Labs Reviewed: reviewed  Pertinent Labs Comments: Na 148, BUN 42, Glu 169, POCT Glu 139-195, HgbA1c 5.4, Phos 2.3, Alb 1.8  Pertinent Medications Reviewed: reviewed  Pertinent Medications Comments: famotidine, lasix    Estimated/Assessed Needs  Weight Used For Calorie Calculations: 69.9 kg (154 lb 1.6 oz)  Energy Calorie Requirements (kcal): 1456 kcal/day  Energy Need Method: [facility]  Protein Requirements: 84-98 g/day(1.2-1.4 g/kg)  Weight Used For " Protein Calculations: 69.9 kg (154 lb 1.6 oz)  Fluid Requirements (mL): 1 mL/kcal or per MD  Estimated Fluid Requirement Method: RDA Method  RDA Method (mL): 1456    Nutrition Prescription Ordered  Current Diet Order: NPO  Current Nutrition Support Formula Ordered: Peptamen Intense VHP  Current Nutrition Support Rate Ordered: 45 (ml)  Current Nutrition Support Frequency Ordered: mL/hr    Evaluation of Received Nutrient/Fluid Intake  Enteral Calories (kcal): 1080  Enteral Protein (gm): 99  Enteral (Free Water) Fluid (mL): 907  % Kcal Needs: 74%  % Protein Needs: 100%  I/O: -4.8L since admit  Energy Calories Required: not meeting needs  Protein Required: meeting needs  Fluid Required: (per MD)  Comments: LBM 6/11  Tolerance: tolerating  % Intake of Estimated Energy Needs: 50 - 75 %  % Meal Intake: NPO    Nutrition Risk  Level of Risk/Frequency of Follow-up: low(1x/week)     Assessment and Plan  Nutrition Problem  Inadequate energy intake     Related to (etiology):   Decreased ability to consume sufficient energy     Signs and Symptoms (as evidenced by):   NPO and TF providing < 85% EEN and EPN     Interventions/Recommendations (treatment strategy):  Collaboration and referral of nutrition care     Nutrition Diagnosis Status:   Continues    Monitor and Evaluation  Food and Nutrient Intake: energy intake, enteral nutrition intake  Food and Nutrient Adminstration: enteral and parenteral nutrition administration  Anthropometric Measurements: weight, weight change  Biochemical Data, Medical Tests and Procedures: electrolyte and renal panel, gastrointestinal profile, glucose/endocrine profile, inflammatory profile  Nutrition-Focused Physical Findings: overall appearance     Nutrition Follow-Up  RD Follow-up?: Yes

## 2019-06-12 VITALS
WEIGHT: 196.19 LBS | TEMPERATURE: 98 F | RESPIRATION RATE: 15 BRPM | HEART RATE: 74 BPM | HEIGHT: 65 IN | DIASTOLIC BLOOD PRESSURE: 77 MMHG | OXYGEN SATURATION: 99 % | BODY MASS INDEX: 32.69 KG/M2 | SYSTOLIC BLOOD PRESSURE: 152 MMHG

## 2019-06-12 LAB
ALBUMIN SERPL BCP-MCNC: 1.8 G/DL (ref 3.5–5.2)
ALLENS TEST: ABNORMAL
ALP SERPL-CCNC: 115 U/L (ref 55–135)
ALT SERPL W/O P-5'-P-CCNC: 7 U/L (ref 10–44)
ANION GAP SERPL CALC-SCNC: 11 MMOL/L (ref 8–16)
AST SERPL-CCNC: 21 U/L (ref 10–40)
BASOPHILS # BLD AUTO: 0.04 K/UL (ref 0–0.2)
BASOPHILS NFR BLD: 0.5 % (ref 0–1.9)
BILIRUB SERPL-MCNC: 0.5 MG/DL (ref 0.1–1)
BUN SERPL-MCNC: 41 MG/DL (ref 6–20)
CALCIUM SERPL-MCNC: 9.3 MG/DL (ref 8.7–10.5)
CHLORIDE SERPL-SCNC: 105 MMOL/L (ref 95–110)
CO2 SERPL-SCNC: 31 MMOL/L (ref 23–29)
CREAT SERPL-MCNC: 0.8 MG/DL (ref 0.5–1.4)
DIFFERENTIAL METHOD: ABNORMAL
EOSINOPHIL # BLD AUTO: 0.9 K/UL (ref 0–0.5)
EOSINOPHIL NFR BLD: 9.8 % (ref 0–8)
ERYTHROCYTE [DISTWIDTH] IN BLOOD BY AUTOMATED COUNT: 15.1 % (ref 11.5–14.5)
EST. GFR  (AFRICAN AMERICAN): >60 ML/MIN/1.73 M^2
EST. GFR  (NON AFRICAN AMERICAN): >60 ML/MIN/1.73 M^2
GLUCOSE SERPL-MCNC: 180 MG/DL (ref 70–110)
GRAM STN SPEC: NORMAL
GRAM STN SPEC: NORMAL
HCO3 UR-SCNC: 32.6 MMOL/L (ref 24–28)
HCT VFR BLD AUTO: 32 % (ref 37–48.5)
HGB BLD-MCNC: 9.5 G/DL (ref 12–16)
IMM GRANULOCYTES # BLD AUTO: 0.03 K/UL (ref 0–0.04)
IMM GRANULOCYTES NFR BLD AUTO: 0.3 % (ref 0–0.5)
LYMPHOCYTES # BLD AUTO: 2.1 K/UL (ref 1–4.8)
LYMPHOCYTES NFR BLD: 23.5 % (ref 18–48)
MAGNESIUM SERPL-MCNC: 1.8 MG/DL (ref 1.6–2.6)
MCH RBC QN AUTO: 27.9 PG (ref 27–31)
MCHC RBC AUTO-ENTMCNC: 29.7 G/DL (ref 32–36)
MCV RBC AUTO: 94 FL (ref 82–98)
MONOCYTES # BLD AUTO: 0.7 K/UL (ref 0.3–1)
MONOCYTES NFR BLD: 8.4 % (ref 4–15)
NEUTROPHILS # BLD AUTO: 5 K/UL (ref 1.8–7.7)
NEUTROPHILS NFR BLD: 57.5 % (ref 38–73)
NRBC BLD-RTO: 0 /100 WBC
PCO2 BLDA: 47.3 MMHG (ref 35–45)
PH SMN: 7.45 [PH] (ref 7.35–7.45)
PHOSPHATE SERPL-MCNC: 3.3 MG/DL (ref 2.7–4.5)
PLATELET # BLD AUTO: 317 K/UL (ref 150–350)
PMV BLD AUTO: 10.9 FL (ref 9.2–12.9)
PO2 BLDA: 30 MMHG (ref 40–60)
POC BE: 9 MMOL/L
POC SATURATED O2: 59 % (ref 95–100)
POC TCO2: 34 MMOL/L (ref 24–29)
POCT GLUCOSE: 166 MG/DL (ref 70–110)
POCT GLUCOSE: 187 MG/DL (ref 70–110)
POCT GLUCOSE: 211 MG/DL (ref 70–110)
POTASSIUM SERPL-SCNC: 3.8 MMOL/L (ref 3.5–5.1)
PROT SERPL-MCNC: 7.4 G/DL (ref 6–8.4)
RBC # BLD AUTO: 3.41 M/UL (ref 4–5.4)
SAMPLE: ABNORMAL
SITE: ABNORMAL
SODIUM SERPL-SCNC: 147 MMOL/L (ref 136–145)
WBC # BLD AUTO: 8.71 K/UL (ref 3.9–12.7)

## 2019-06-12 PROCEDURE — 25000003 PHARM REV CODE 250: Performed by: NURSE PRACTITIONER

## 2019-06-12 PROCEDURE — 83735 ASSAY OF MAGNESIUM: CPT

## 2019-06-12 PROCEDURE — 82803 BLOOD GASES ANY COMBINATION: CPT

## 2019-06-12 PROCEDURE — 84100 ASSAY OF PHOSPHORUS: CPT

## 2019-06-12 PROCEDURE — 63600175 PHARM REV CODE 636 W HCPCS: Performed by: STUDENT IN AN ORGANIZED HEALTH CARE EDUCATION/TRAINING PROGRAM

## 2019-06-12 PROCEDURE — 80053 COMPREHEN METABOLIC PANEL: CPT

## 2019-06-12 PROCEDURE — 94003 VENT MGMT INPAT SUBQ DAY: CPT

## 2019-06-12 PROCEDURE — 99232 PR SUBSEQUENT HOSPITAL CARE,LEVL II: ICD-10-PCS | Mod: ,,, | Performed by: PHYSICIAN ASSISTANT

## 2019-06-12 PROCEDURE — 63600175 PHARM REV CODE 636 W HCPCS: Performed by: GENERAL ACUTE CARE HOSPITAL

## 2019-06-12 PROCEDURE — 25000003 PHARM REV CODE 250: Performed by: PHYSICIAN ASSISTANT

## 2019-06-12 PROCEDURE — 85025 COMPLETE CBC W/AUTO DIFF WBC: CPT

## 2019-06-12 PROCEDURE — A4216 STERILE WATER/SALINE, 10 ML: HCPCS | Performed by: NURSE PRACTITIONER

## 2019-06-12 PROCEDURE — 99900035 HC TECH TIME PER 15 MIN (STAT)

## 2019-06-12 PROCEDURE — 63600175 PHARM REV CODE 636 W HCPCS: Performed by: NURSE PRACTITIONER

## 2019-06-12 PROCEDURE — 63600175 PHARM REV CODE 636 W HCPCS: Performed by: PHYSICIAN ASSISTANT

## 2019-06-12 PROCEDURE — 99291 CRITICAL CARE FIRST HOUR: CPT | Mod: ,,, | Performed by: NURSE PRACTITIONER

## 2019-06-12 PROCEDURE — 99291 PR CRITICAL CARE, E/M 30-74 MINUTES: ICD-10-PCS | Mod: ,,, | Performed by: NURSE PRACTITIONER

## 2019-06-12 PROCEDURE — 25000003 PHARM REV CODE 250: Performed by: GENERAL ACUTE CARE HOSPITAL

## 2019-06-12 PROCEDURE — 25000003 PHARM REV CODE 250: Performed by: INTERNAL MEDICINE

## 2019-06-12 PROCEDURE — 99232 SBSQ HOSP IP/OBS MODERATE 35: CPT | Mod: ,,, | Performed by: PHYSICIAN ASSISTANT

## 2019-06-12 PROCEDURE — 94761 N-INVAS EAR/PLS OXIMETRY MLT: CPT

## 2019-06-12 PROCEDURE — 27200966 HC CLOSED SUCTION SYSTEM

## 2019-06-12 PROCEDURE — 99900026 HC AIRWAY MAINTENANCE (STAT)

## 2019-06-12 PROCEDURE — 27000221 HC OXYGEN, UP TO 24 HOURS

## 2019-06-12 RX ORDER — CEFADROXIL 1000 MG/1
1 TABLET ORAL EVERY 12 HOURS
Status: DISCONTINUED | OUTPATIENT
Start: 2019-06-15 | End: 2019-06-12 | Stop reason: HOSPADM

## 2019-06-12 RX ORDER — GLUCAGON 1 MG
1 KIT INJECTION
Status: CANCELLED | OUTPATIENT
Start: 2019-06-12

## 2019-06-12 RX ORDER — ONDANSETRON 2 MG/ML
4 INJECTION INTRAMUSCULAR; INTRAVENOUS EVERY 6 HOURS PRN
Status: CANCELLED | OUTPATIENT
Start: 2019-06-12

## 2019-06-12 RX ORDER — INSULIN ASPART 100 [IU]/ML
1-10 INJECTION, SOLUTION INTRAVENOUS; SUBCUTANEOUS EVERY 6 HOURS PRN
Status: CANCELLED | OUTPATIENT
Start: 2019-06-12

## 2019-06-12 RX ORDER — LOPERAMIDE HCL 1MG/7.5ML
2 LIQUID (ML) ORAL 4 TIMES DAILY PRN
Status: CANCELLED | OUTPATIENT
Start: 2019-06-12

## 2019-06-12 RX ORDER — ACETAMINOPHEN 650 MG/20.3ML
650 LIQUID ORAL EVERY 6 HOURS PRN
Status: CANCELLED | OUTPATIENT
Start: 2019-06-12

## 2019-06-12 RX ORDER — MAGNESIUM SULFATE HEPTAHYDRATE 40 MG/ML
2 INJECTION, SOLUTION INTRAVENOUS
Status: CANCELLED | OUTPATIENT
Start: 2019-06-12

## 2019-06-12 RX ORDER — POTASSIUM CHLORIDE 20 MEQ/15ML
60 SOLUTION ORAL
Status: CANCELLED | OUTPATIENT
Start: 2019-06-12

## 2019-06-12 RX ORDER — HEPARIN SODIUM 5000 [USP'U]/ML
5000 INJECTION, SOLUTION INTRAVENOUS; SUBCUTANEOUS EVERY 8 HOURS
Status: CANCELLED | OUTPATIENT
Start: 2019-06-12

## 2019-06-12 RX ORDER — POTASSIUM CHLORIDE 20 MEQ/15ML
40 SOLUTION ORAL
Status: CANCELLED | OUTPATIENT
Start: 2019-06-12

## 2019-06-12 RX ORDER — IPRATROPIUM BROMIDE AND ALBUTEROL SULFATE 2.5; .5 MG/3ML; MG/3ML
3 SOLUTION RESPIRATORY (INHALATION) EVERY 4 HOURS PRN
Status: CANCELLED | OUTPATIENT
Start: 2019-06-12

## 2019-06-12 RX ORDER — FAMOTIDINE 20 MG/1
20 TABLET, FILM COATED ORAL 2 TIMES DAILY
Status: CANCELLED | OUTPATIENT
Start: 2019-06-12

## 2019-06-12 RX ORDER — SODIUM CHLORIDE 0.9 % (FLUSH) 0.9 %
3 SYRINGE (ML) INJECTION EVERY 8 HOURS
Status: CANCELLED | OUTPATIENT
Start: 2019-06-12

## 2019-06-12 RX ORDER — NITROGLYCERIN 0.4 MG/1
0.4 TABLET SUBLINGUAL EVERY 5 MIN PRN
Status: CANCELLED | OUTPATIENT
Start: 2019-06-12

## 2019-06-12 RX ORDER — SODIUM CHLORIDE 0.9 % (FLUSH) 0.9 %
10 SYRINGE (ML) INJECTION
Status: CANCELLED | OUTPATIENT
Start: 2019-06-12

## 2019-06-12 RX ORDER — ATORVASTATIN CALCIUM 20 MG/1
80 TABLET, FILM COATED ORAL DAILY
Status: CANCELLED | OUTPATIENT
Start: 2019-06-13

## 2019-06-12 RX ORDER — LOSARTAN POTASSIUM 25 MG/1
25 TABLET ORAL DAILY
Status: CANCELLED | OUTPATIENT
Start: 2019-06-13

## 2019-06-12 RX ORDER — SODIUM,POTASSIUM PHOSPHATES 280-250MG
2 POWDER IN PACKET (EA) ORAL
Status: CANCELLED | OUTPATIENT
Start: 2019-06-12

## 2019-06-12 RX ORDER — FENTANYL CITRATE 50 UG/ML
50 INJECTION, SOLUTION INTRAMUSCULAR; INTRAVENOUS
Status: CANCELLED | OUTPATIENT
Start: 2019-06-12

## 2019-06-12 RX ORDER — MORPHINE SULFATE 2 MG/ML
2 INJECTION, SOLUTION INTRAMUSCULAR; INTRAVENOUS EVERY 4 HOURS PRN
Status: CANCELLED | OUTPATIENT
Start: 2019-06-12

## 2019-06-12 RX ORDER — CHLORHEXIDINE GLUCONATE ORAL RINSE 1.2 MG/ML
15 SOLUTION DENTAL 2 TIMES DAILY
Status: CANCELLED | OUTPATIENT
Start: 2019-06-12

## 2019-06-12 RX ORDER — FUROSEMIDE 10 MG/ML
60 INJECTION INTRAMUSCULAR; INTRAVENOUS DAILY
Status: DISCONTINUED | OUTPATIENT
Start: 2019-06-13 | End: 2019-06-12 | Stop reason: HOSPADM

## 2019-06-12 RX ORDER — FUROSEMIDE 10 MG/ML
60 INJECTION INTRAMUSCULAR; INTRAVENOUS DAILY
Status: CANCELLED | OUTPATIENT
Start: 2019-06-13

## 2019-06-12 RX ORDER — SCOLOPAMINE TRANSDERMAL SYSTEM 1 MG/1
1 PATCH, EXTENDED RELEASE TRANSDERMAL
Status: CANCELLED | OUTPATIENT
Start: 2019-06-13

## 2019-06-12 RX ORDER — NAPROXEN SODIUM 220 MG/1
81 TABLET, FILM COATED ORAL DAILY
Status: CANCELLED | OUTPATIENT
Start: 2019-06-13

## 2019-06-12 RX ADMIN — MEROPENEM 2 G: 1 INJECTION, POWDER, FOR SOLUTION INTRAVENOUS at 09:06

## 2019-06-12 RX ADMIN — HEPARIN SODIUM 5000 UNITS: 5000 INJECTION, SOLUTION INTRAVENOUS; SUBCUTANEOUS at 02:06

## 2019-06-12 RX ADMIN — MORPHINE SULFATE 2 MG: 2 INJECTION, SOLUTION INTRAMUSCULAR; INTRAVENOUS at 05:06

## 2019-06-12 RX ADMIN — INSULIN ASPART 2 UNITS: 100 INJECTION, SOLUTION INTRAVENOUS; SUBCUTANEOUS at 12:06

## 2019-06-12 RX ADMIN — MEROPENEM 2 G: 1 INJECTION, POWDER, FOR SOLUTION INTRAVENOUS at 01:06

## 2019-06-12 RX ADMIN — MAGNESIUM SULFATE IN WATER 2 G: 40 INJECTION, SOLUTION INTRAVENOUS at 03:06

## 2019-06-12 RX ADMIN — Medication 25 MG: at 09:06

## 2019-06-12 RX ADMIN — Medication 3 ML: at 06:06

## 2019-06-12 RX ADMIN — ASPIRIN 81 MG CHEWABLE TABLET 81 MG: 81 TABLET CHEWABLE at 09:06

## 2019-06-12 RX ADMIN — HEPARIN SODIUM 5000 UNITS: 5000 INJECTION, SOLUTION INTRAVENOUS; SUBCUTANEOUS at 06:06

## 2019-06-12 RX ADMIN — FAMOTIDINE 20 MG: 20 TABLET, FILM COATED ORAL at 09:06

## 2019-06-12 RX ADMIN — ATORVASTATIN CALCIUM 80 MG: 20 TABLET, FILM COATED ORAL at 09:06

## 2019-06-12 RX ADMIN — Medication 3 ML: at 02:06

## 2019-06-12 RX ADMIN — MORPHINE SULFATE 2 MG: 2 INJECTION, SOLUTION INTRAMUSCULAR; INTRAVENOUS at 10:06

## 2019-06-12 RX ADMIN — INSULIN ASPART 2 UNITS: 100 INJECTION, SOLUTION INTRAVENOUS; SUBCUTANEOUS at 06:06

## 2019-06-12 RX ADMIN — FUROSEMIDE 60 MG: 10 INJECTION, SOLUTION INTRAVENOUS at 09:06

## 2019-06-12 RX ADMIN — POTASSIUM CHLORIDE 40 MEQ: 20 SOLUTION ORAL at 03:06

## 2019-06-12 RX ADMIN — LOSARTAN POTASSIUM 25 MG: 25 TABLET, FILM COATED ORAL at 09:06

## 2019-06-12 RX ADMIN — FUROSEMIDE 60 MG: 10 INJECTION, SOLUTION INTRAVENOUS at 12:06

## 2019-06-12 RX ADMIN — CHLORHEXIDINE GLUCONATE 0.12% ORAL RINSE 15 ML: 1.2 LIQUID ORAL at 09:06

## 2019-06-12 RX ADMIN — Medication 300 MG: at 09:06

## 2019-06-12 NOTE — PLAN OF CARE
Patient scheduled to D/C today, transfer to [facility] room 270. MICU floor nurse notified of pending D/C and that report can be called to [phone]. Cristine to set up stretcher transport for patient transfer this evening.        06/12/19 1356   Post-Acute Status   Post-Acute Authorization Placement   Post-Acute Placement Status Set-up Complete   Discharge Delays (!) Patient Transportation     Jes Coronado RN, [place]  Case Management-Critical Care     [phone]

## 2019-06-12 NOTE — SUBJECTIVE & OBJECTIVE
Review of Systems   Reason unable to perform ROS: limited due to trach.   Respiratory: Negative for shortness of breath and wheezing.    Cardiovascular: Negative for chest pain and palpitations.   Gastrointestinal: Negative for abdominal distention, abdominal pain, diarrhea and nausea.   Musculoskeletal: Positive for back pain.     Objective:     Vital Signs (Most Recent):  Temp: 98.1 °F (36.7 °C) (06/12/19 1200)  Pulse: 74 (06/12/19 1331)  Resp: (!) 26 (06/12/19 1331)  BP: (!) 94/51 (06/12/19 1300)  SpO2: 95 % (06/12/19 1331) Vital Signs (24h Range):  Temp:  [98.1 °F (36.7 °C)-98.7 °F (37.1 °C)] 98.1 °F (36.7 °C)  Pulse:  [66-91] 74  Resp:  [12-39] 26  SpO2:  [91 %-100 %] 95 %  BP: ()/(51-85) 94/51   Weight: 89 kg (196 lb 3.4 oz)  Body mass index is 32.65 kg/m².      Intake/Output Summary (Last 24 hours) at 6/12/2019 1348  Last data filed at 6/12/2019 1300  Gross per 24 hour   Intake 2362 ml   Output 3165 ml   Net -803 ml       Physical Exam   Constitutional: She is oriented to person, place, and time. Vital signs are normal. She appears well-developed. She is cooperative. She is easily aroused. She has a sickly appearance. No distress. Cervical collar in place.   6 shiley on trach collar   HENT:   Head: Normocephalic and atraumatic.   Eyes: Pupils are equal, round, and reactive to light. Conjunctivae are normal. Right eye exhibits no exudate. Left eye exhibits no exudate. Right conjunctiva has no hemorrhage. Left conjunctiva has no hemorrhage. No scleral icterus. Right eye exhibits no nystagmus. Left eye exhibits no nystagmus.   Neck: Trachea normal. No neck rigidity. No tracheal deviation present.   Cardiovascular: Normal rate, regular rhythm and normal heart sounds. PMI is not displaced. Exam reveals no gallop and no friction rub.   No murmur heard.  Pulses:       Radial pulses are 1+ on the right side, and 1+ on the left side.        Dorsalis pedis pulses are 1+ on the right side, and 1+ on the left  side.   Pulmonary/Chest: Effort normal and breath sounds normal. No accessory muscle usage. No respiratory distress. She has no wheezes. She has no rhonchi. She has no rales.   Abdominal: Soft. Normal appearance and bowel sounds are normal. There is no tenderness.   Musculoskeletal: Normal range of motion.   Neurological: She is alert, oriented to person, place, and time and easily aroused. No cranial nerve deficit or sensory deficit. GCS eye subscore is 4. GCS verbal subscore is 1. GCS motor subscore is 6.   SAHA, no focal deficits noted, follows commands   Skin: Skin is warm and dry. Capillary refill takes 2 to 3 seconds. She is not diaphoretic. No cyanosis. Nails show no clubbing.   Psychiatric: She has a normal mood and affect. Her behavior is normal.   Nursing note and vitals reviewed.      Vents:  Vent Mode: Spont (06/12/19 0746)  Ventilator Initiated: Yes (06/06/19 1416)  Set Rate: 16 bmp (06/11/19 0730)  Vt Set: 400 mL (06/11/19 0730)  Pressure Support: 5 cmH20 (06/12/19 0746)  PEEP/CPAP: 5 cmH20 (06/12/19 0746)  Oxygen Concentration (%): 28 (06/12/19 1331)  Peak Airway Pressure: 11 cmH2O (06/12/19 0746)  Plateau Pressure: 0 cmH20 (06/12/19 0746)  Total Ve: 4.58 mL (06/12/19 0746)  F/VT Ratio<105 (RSBI): (!) 62.83 (06/12/19 0746)  Lines/Drains/Airways     Peripherally Inserted Central Catheter Line                 PICC Double Lumen 05/24/19 0900 right brachial 19 days          Drain                 Nephrostomy 04/21/19 0629 Left 8 Fr. 52 days         Gastrostomy/Enterostomy 05/02/19 1134 Percutaneous endoscopic gastrostomy (PEG) LUQ decompression;feeding 41 days         Urethral Catheter 06/06/19 1835 16 Fr. 5 days          Airway                 Surgical Airway 06/03/19 1234 Shiley Cuffed 9 days              Significant Labs:    CBC/Anemia Profile:  Recent Labs   Lab 06/11/19  0355 06/12/19  0252   WBC 8.13 8.71   HGB 9.3* 9.5*   HCT 30.8* 32.0*    317   MCV 91 94   RDW 15.4* 15.1*         Chemistries:  Recent Labs   Lab 06/11/19  0355 06/11/19  2257 06/12/19  0254   *  --  147*   K 3.5 3.9 3.8     --  105   CO2 28  --  31*   BUN 42*  --  41*   CREATININE 0.9  --  0.8   CALCIUM 9.2  --  9.3   ALBUMIN 1.8*  --  1.8*   PROT 7.4  --  7.4   BILITOT 0.5  --  0.5   ALKPHOS 128  --  115   ALT 7*  --  7*   AST 18  --  21   MG 2.1 1.9 1.8   PHOS 2.3* 2.9 3.3       All pertinent labs within the past 24 hours have been reviewed.    Significant Imaging:  I have reviewed all pertinent imaging results/findings within the past 24 hours.

## 2019-06-12 NOTE — ASSESSMENT & PLAN NOTE
--concern for acute on chronic heart failure exacerbation  --TTE on 05/06 with reduced EF 40-45%, down from 53% on echo from 05/09  --new WMA and grade II diastolic dysfunction and moderate mitral regurgitation  --continuelasix 60 mg daily  --continue medical management lopressor and losartan   --overall negative 5L

## 2019-06-12 NOTE — ASSESSMENT & PLAN NOTE
--Cardiology following. TTE on 6/7/19 with LVEF 40-45%, grade 2 diastolic dysfunction and segmental wall motion abnormalities. She is currently not a candidate for aggressive cardiology intervention given her anemia requiring pRBC transfusion.   --Troponin peak 5.9  --On aspirin, metoprolol, and losartan.   --change lasix to 60mg daily

## 2019-06-12 NOTE — NURSING
Wound care follow up.       Patient continues with denuding to the perianal area. Area of denuding appears stable in size.  Patient incontinent of stool and unable to perform self care. Nursing has been apply Triad paste to the area.  Wound to the left back appears to be epithelialized. Wound to the right back improving. Wound approx 0.7 x 0.5 x 0.1 cm. Triad and border applied.     Patient on [facility] mattress and is scheduled to transfer to [facility].    Wound care to follow PRN.  Lanny Mireles RN CWCN CN   [phone]    Perianal     Left Back    Right back

## 2019-06-12 NOTE — DISCHARGE SUMMARY
[facility]  Critical Care Medicine  Discharge Summary      Patient Name: Mariann Huff  MRN: [id number]  Admission Date: 6/6/2019  Hospital Length of Stay: 6 days  Discharge Date and Time:  06/12/2019 5:06 PM  Attending Physician: Paul Molina MD   Discharging Provider: Jaron Iraheta NP  Primary Care Provider: Jasbir Haney MD  Reason for Admission: Shortness of breath    HPI:   Mariann Huff is a 60 y/o female with history of Asthma, HTN, HLD,  CAD (LAD GINGER proximal and distal 2013 and GINGER ostial LAD 2/2014), HFpEF, NSTEMI, T2DM, Obesity, Sleep Apnea, and back pain who presented to the ED on 6/6/19 with acute shortness of breath started last night.      Mrs. Huff has had two recent hospitalizations. 4/12/19-4/14/19 for L5-S1 OLIF with Neurosurgery with intraoperaative left ureteral injury with ureteroureteral anastomosis and ureteral stent placement. Second admission, 4/20/19-6/5/19 for intraabdominal abscess s/p washout and ligation of left ureter with nephrostomy tube placement on 4/21/19 and tracheostomy and PEG placement on 5/2/19.  Most recent hospitalization complicated by BUE and RLE DVT s/p IVC filter on 5/29/19 given concern for GIB with workup revealing rectal ulcer requiring pRBC transfusion.  She was followed by ID during admission and is being treated with long term antibiotic therapy for E coli and Staph lugdenesis bacteremia with IV cefazolin and rifampin with end dated 6/18/19 given hardware.  She also completed 7 day course of cefepime for HAP (pseudomonas) on 5/25/19 and candida glabrata UTI that she received 7 days of high dose fluconazole.   She was discharged to rehab.    In the ED, she was started empirically on vancomycin and cefepime for possible PNA, CTA chest ordered for possible PE.  Labs remarkable for BNP 1403, lactate wnl, wbc 13 K, and sCr 1.4 ( from 0.8).  She was given a duo neb and placed on PSV. She is being admitted to the ICU with critical care  medicine for further workup and evaluation.      * No surgery found *    Indwelling Lines/Drains at Time of Discharge:   Lines/Drains/Airways     Peripherally Inserted Central Catheter Line                 PICC Double Lumen 05/24/19 0900 right brachial 19 days          Drain                 Nephrostomy 04/21/19 0629 Left 8 Fr. 52 days         Gastrostomy/Enterostomy 05/02/19 1134 Percutaneous endoscopic gastrostomy (PEG) LUQ decompression;feeding 41 days         Urethral Catheter 06/06/19 1835 16 Fr. 5 days          Airway                 Surgical Airway 06/03/19 1234 Shiley Cuffed 9 days              Hospital Course:   Ms. Huff was admitted to the MICU on 06/06. The morning of 06/07/19 her Hb had dropped below 7 so she was transfused 2 units of PRBCs for acute blood loss from bleeding rectal ulcer. Cardiology was consulted for concern of NSTEMI 2/2 increasing Troponin; however, recommended not to treat as ACS because of bleeding and to manage with medical therapy. Repeat echo with reduced EF 40-45% from 53% and new diastolic dysfunction, wall motion abnormalities and worsening mitral regurgitation from previous echo on 05/09/19. She is diuresing well with lasix 60mg IV TID. Her sputum culture is positive for pseudomonas with resistance to pip tazo so abx changed to meropenem. ID following. Daily trach collar trials continue with a noted improvement on 06/10/19. LTACH consult placed     Consults (From admission, onward)        Status Ordering Provider     Inpatient consult to Cardiology  Once     Provider:  (Not yet assigned)    Completed ANGELICA PAIZ     Inpatient consult to Critical Care Medicine  Once     Provider:  (Not yet assigned)    Completed GUADALUPE OLSON     Inpatient consult to Infectious Diseases  Once     Provider:  (Not yet assigned)    Completed ANGELICA PAIZ     Inpatient consult to Infectious Diseases  Once     Provider:  (Not yet assigned)    Completed APOLINAR GARY     Inpatient consult  to Interventional Radiology  Once     Provider:  (Not yet assigned)    Completed ANGELICA PAIZ     Inpatient consult to Palliative Care  Once     Provider:  (Not yet assigned)    Completed APOLINAR GARY     IP consult to dietary  Once     Provider:  (Not yet assigned)    Completed ANGELICA PAIZ        Significant Labs:  All pertinent labs within the past 24 hours have been reviewed.    Significant Imaging:  I have reviewed all pertinent imaging results/findings within the past 24 hours.    Pending Diagnostic Studies:     Procedure Component Value Units Date/Time    Basic metabolic panel [[id number]] Collected:  06/12/19 0252    Order Status:  Sent Lab Status:  In process Updated:  06/12/19 0253    Specimen:  Blood     IR Abscess Aspiration [[id number]] Resulted:  06/11/19 1647    Order Status:  Sent Lab Status:  In process Updated:  06/11/19 1647    Magnesium [[id number]] Collected:  06/12/19 0254    Order Status:  Sent Lab Status:  In process Updated:  06/12/19 0254    Specimen:  Blood         Final Active Diagnoses:    Diagnosis Date Noted POA    PRINCIPAL PROBLEM:  Acute on chronic respiratory failure with hypoxia [J96.21] 06/06/2019 Yes    Debility [R53.81] 06/10/2019 Unknown    Pneumonia of both lungs due to Pseudomonas species [J15.1] 06/09/2019 Yes    Stented coronary artery [Z95.5]  Not Applicable    Alteration in skin integrity [R23.9] 06/07/2019 Yes    (HFpEF) heart failure with preserved ejection fraction [I50.30] 06/06/2019 Yes    Elevated troponin [R74.8] 06/06/2019 Yes    Palliative care encounter [Z51.5] 05/27/2019 Not Applicable    Dermatitis associated with moisture [L30.8] 05/06/2019 Yes    Normocytic anemia [D64.9]  Yes    Type 2 diabetes mellitus, with long-term current use of insulin [E11.9, Z79.4] 04/20/2019 Not Applicable    Essential hypertension [I10] 04/20/2019 Yes    NSTEMI (non-ST elevated myocardial infarction) [I21.4] 02/17/2014 Yes    CAD (coronary artery disease)  [I25.10]  Yes     Chronic      Problems Resolved During this Admission:    Diagnosis Date Noted Date Resolved POA    Fever [R50.9] 06/07/2019 06/09/2019 Yes    PAPA (acute kidney injury) [N17.9] 02/17/2014 06/07/2019 Yes     No new Assessment & Plan notes have been filed under this hospital service since the last note was generated.  Service: Critical Care Medicine    Discharged Condition: good    Disposition:       Patient Instructions:   No discharge procedures on file.  Medications:  Transfer Medications (for Discharge Readmit only):   Current Facility-Administered Medications   Medication Dose Route Frequency Provider Last Rate Last Dose    acetaminophen oral solution 650 mg  650 mg Per G Tube Q6H PRN Behram Khan, MD   650 mg at 06/09/19 1823    albuterol-ipratropium 2.5 mg-0.5 mg/3 mL nebulizer solution 3 mL  3 mL Nebulization Q4H PRN Lisa George NP   3 mL at 06/11/19 1105    aspirin chewable tablet 81 mg  81 mg Per G Tube Daily Crystal Mejía MD   81 mg at 06/12/19 0904    atorvastatin tablet 80 mg  80 mg Per G Tube Daily Crystal Mejía MD   80 mg at 06/12/19 0904    [START ON 6/15/2019] cefadroxil tablet 1 g  1 g Per G Tube Q12H Jaron Iraheta NP        chlorhexidine 0.12 % solution 15 mL  15 mL Mouth/Throat BID Lisa George NP   15 mL at 06/12/19 0904    dextrose 10% (D10W) Bolus  12.5 g Intravenous PRN Crystal Mejía MD        dextrose 10% (D10W) Bolus  25 g Intravenous PRN Crystal Mejía MD        famotidine tablet 20 mg  20 mg Per G Tube BID Lisa George NP   20 mg at 06/12/19 0904    fentaNYL injection 50 mcg  50 mcg Intravenous Q1H PRN Lisa George NP   50 mcg at 06/11/19 1950    [START ON 6/13/2019] furosemide injection 60 mg  60 mg Intravenous Daily Jaron Iraheta NP        glucagon (human recombinant) injection 1 mg  1 mg Intramuscular PRN Lisa George NP        heparin (porcine) injection 5,000 Units  5,000 Units Subcutaneous Q8H Gabino GERBER  JOSE Brown   5,000 Units at 06/12/19 1422    insulin aspart U-100 pen 1-10 Units  1-10 Units Subcutaneous Q6H PRN Lisa George NP   2 Units at 06/12/19 1230    loperamide 1 mg/7.5 mL solution 2 mg  2 mg Per G Tube QID PRN Behram Khan, MD   2 mg at 06/09/19 0802    losartan tablet 25 mg  25 mg Per G Tube Daily Jaron Iraheta NP   25 mg at 06/12/19 0904    magnesium sulfate 2g in water 50mL IVPB (premix)  2 g Intravenous PRN Gabino Brown PA-C   2 g at 06/12/19 0354    magnesium sulfate 2g in water 50mL IVPB (premix)  2 g Intravenous PRN Gabino Brown PA-C   2 g at 06/09/19 1930    meropenem (MERREM) 2 g in sodium chloride 0.9% 100 mL IVPB  2 g Intravenous Q8H Jaron Iraheta  mL/hr at 06/12/19 0917 2 g at 06/12/19 0917    metoprolol 12.5mg/1.25mL oral solution 25 mg  25 mg Per G Tube BID Gabino Brown PA-C   25 mg at 06/12/19 0904    morphine injection 2 mg  2 mg Intravenous Q4H PRN Behram Khan, MD   2 mg at 06/12/19 1025    nitroGLYCERIN SL tablet 0.4 mg  0.4 mg Sublingual Q5 Min PRN Behram Khan, MD        ondansetron injection 4 mg  4 mg Intravenous Q6H PRN Gabino Brown PA-C   4 mg at 06/11/19 2305    potassium chloride 10% oral solution 40 mEq  40 mEq Per NG tube PRN Gabino Brown PA-C   40 mEq at 06/12/19 0356    potassium chloride 10% oral solution 40 mEq  40 mEq Per NG tube PRN Gabino Brown PA-C        potassium chloride 10% oral solution 60 mEq  60 mEq Per NG tube PRN Gabino Brown PA-C        potassium, sodium phosphates 280-160-250 mg packet 2 packet  2 packet Per NG tube PRN Gabino Brown PA-C   2 packet at 06/11/19 0521    potassium, sodium phosphates 280-160-250 mg packet 2 packet  2 packet Per NG tube PRN Gabino Brown PA-C        potassium, sodium phosphates 280-160-250 mg packet 2 packet  2 packet Per NG tube PRN Gabino Brown PA-C        promethazine (PHENERGAN) 6.25 mg in dextrose 5 % 50 mL IVPB  6.25 mg Intravenous Q6H PRN Behram  MD Alfonzo 150 mL/hr at 06/08/19 1930 6.25 mg at 06/08/19 1930    rifAMpin 10 mg/mL oral suspension  300 mg Per G Tube BID Lisa George NP   300 mg at 06/12/19 0929    scopolamine 1.3-1.5 mg (1 mg over 3 days) 1 patch  1 patch Transdermal Q3 Days Behram Khan, MD   1 patch at 06/10/19 2247    sodium chloride 0.9% flush 10 mL  10 mL Intravenous PRN Gabino Brown PA-C        sodium chloride 0.9% flush 3 mL  3 mL Intravenous Q8H Lisa George NP   3 mL at 06/12/19 1423     Facility-Administered Medications Ordered in Other Encounters   Medication Dose Route Frequency Provider Last Rate Last Dose    lidocaine (PF) 10 mg/ml (1%) injection 10 mg  1 mL Intradermal Once MD Jaron Sharma Jr., NP  Critical Care Medicine  [facility]

## 2019-06-12 NOTE — PLAN OF CARE
Patient scheduled to D/C today, transfer to [facility] room 270. MICU floor nurse notified of pending D/C and that report can be called to [phone]. Cristine to set up stretcher transport for patient transfer this evening.     Future Appointments   Date Time Provider Department Center   6/18/2019 12:00 PM [facility] OIC-XRAY [facility] XRAY [facility]   6/18/2019  1:20 PM Robin Boyd MD [facility] UROLOGY [facility]   6/20/2019 10:45 AM EVGENY DAVIESY [facility] CYSTO[facility]        06/12/19 1404   Final Note   Assessment Type Final Discharge Note   Anticipated Discharge Disposition LT   Hospital Follow Up  Appt(s) scheduled? Yes   Discharge plans and expectations educations in teach back method with documentation complete? Yes   Right Care Referral Info   Post Acute Recommendation Other   Referral Type LT    Facility Name [facility]      Jes Coronado RN, CLNC  Case Management-Critical Care     [phone]

## 2019-06-12 NOTE — ASSESSMENT & PLAN NOTE
--Suspect volume overload in the setting of NSTEMI with concomitant pseudomonas pna.  --continue lasix 60 mg TID.   --continue meropenem for 5 day course for pseudomonas aeruginosa (5/19/19 and 6/6/19)  --trach collar during day as tolerated; mechanical ventilation at night.   --bilateral pleural effusions on imaging. Bedside u/s without evidence on R lung and minimal fluid on L

## 2019-06-12 NOTE — PROGRESS NOTES
[facility]  Critical Care Medicine  Progress Note    Patient Name: Mariann Huff  MRN: [id number]  Admission Date: 6/6/2019  Hospital Length of Stay: 6 days  Code Status: Full Code  Attending Provider: Paul Molina MD  Primary Care Provider: Jasbir Haney MD   Principal Problem: Acute on chronic respiratory failure with hypoxia    Subjective:     HPI:  Mariann Huff is a 58 y/o female with history of Asthma, HTN, HLD,  CAD (LAD GINGER proximal and distal 2013 and GINGER ostial LAD 2/2014), HFpEF, NSTEMI, T2DM, Obesity, Sleep Apnea, and back pain who presented to the ED on 6/6/19 with acute shortness of breath started last night.      Mrs. Huff has had two recent hospitalizations. 4/12/19-4/14/19 for L5-S1 OLIF with Neurosurgery with intraoperaative left ureteral injury with ureteroureteral anastomosis and ureteral stent placement. Second admission, 4/20/19-6/5/19 for intraabdominal abscess s/p washout and ligation of left ureter with nephrostomy tube placement on 4/21/19 and tracheostomy and PEG placement on 5/2/19.  Most recent hospitalization complicated by BUE and RLE DVT s/p IVC filter on 5/29/19 given concern for GIB with workup revealing rectal ulcer requiring pRBC transfusion.  She was followed by ID during admission and is being treated with long term antibiotic therapy for E coli and Staph lugdenesis bacteremia with IV cefazolin and rifampin with end dated 6/18/19 given hardware.  She also completed 7 day course of cefepime for HAP (pseudomonas) on 5/25/19 and candida glabrata UTI that she received 7 days of high dose fluconazole.   She was discharged to rehab.    In the ED, she was started empirically on vancomycin and cefepime for possible PNA, CTA chest ordered for possible PE.  Labs remarkable for BNP 1403, lactate wnl, wbc 13 K, and sCr 1.4 ( from 0.8).  She was given a duo neb and placed on PSV. She is being admitted to the ICU with critical care medicine for further workup and  evaluation.      Hospital/ICU Course:  Ms. Huff was admitted to the MICU on 06/06. The morning of 06/07/19 her Hb had dropped below 7 so she was transfused 2 units of PRBCs for acute blood loss from bleeding rectal ulcer. Cardiology was consulted for concern of NSTEMI 2/2 increasing Troponin; however, recommended not to treat as ACS because of bleeding and to manage with medical therapy. Repeat echo with reduced EF 40-45% from 53% and new diastolic dysfunction, wall motion abnormalities and worsening mitral regurgitation from previous echo on 05/09/19. She is diuresing well with lasix 60mg IV TID. Her sputum culture is positive for pseudomonas with resistance to pip tazo so abx changed to meropenem. ID following. Daily trach collar trials continue with a noted improvement on 06/10/19. LTACH transfer later today        Review of Systems   Reason unable to perform ROS: limited due to trach.   Respiratory: Negative for shortness of breath and wheezing.    Cardiovascular: Negative for chest pain and palpitations.   Gastrointestinal: Negative for abdominal distention, abdominal pain, diarrhea and nausea.   Musculoskeletal: Positive for back pain.     Objective:     Vital Signs (Most Recent):  Temp: 98.1 °F (36.7 °C) (06/12/19 1200)  Pulse: 74 (06/12/19 1331)  Resp: (!) 26 (06/12/19 1331)  BP: (!) 94/51 (06/12/19 1300)  SpO2: 95 % (06/12/19 1331) Vital Signs (24h Range):  Temp:  [98.1 °F (36.7 °C)-98.7 °F (37.1 °C)] 98.1 °F (36.7 °C)  Pulse:  [66-91] 74  Resp:  [12-39] 26  SpO2:  [91 %-100 %] 95 %  BP: ()/(51-85) 94/51   Weight: 89 kg (196 lb 3.4 oz)  Body mass index is 32.65 kg/m².      Intake/Output Summary (Last 24 hours) at 6/12/2019 1348  Last data filed at 6/12/2019 1300  Gross per 24 hour   Intake 2362 ml   Output 3165 ml   Net -803 ml       Physical Exam   Constitutional: She is oriented to person, place, and time. Vital signs are normal. She appears well-developed. She is cooperative. She is easily  aroused. She has a sickly appearance. No distress. Cervical collar in place.   6 shiley on trach collar   HENT:   Head: Normocephalic and atraumatic.   Eyes: Pupils are equal, round, and reactive to light. Conjunctivae are normal. Right eye exhibits no exudate. Left eye exhibits no exudate. Right conjunctiva has no hemorrhage. Left conjunctiva has no hemorrhage. No scleral icterus. Right eye exhibits no nystagmus. Left eye exhibits no nystagmus.   Neck: Trachea normal. No neck rigidity. No tracheal deviation present.   Cardiovascular: Normal rate, regular rhythm and normal heart sounds. PMI is not displaced. Exam reveals no gallop and no friction rub.   No murmur heard.  Pulses:       Radial pulses are 1+ on the right side, and 1+ on the left side.        Dorsalis pedis pulses are 1+ on the right side, and 1+ on the left side.   Pulmonary/Chest: Effort normal and breath sounds normal. No accessory muscle usage. No respiratory distress. She has no wheezes. She has no rhonchi. She has no rales.   Abdominal: Soft. Normal appearance and bowel sounds are normal. There is no tenderness.   Musculoskeletal: Normal range of motion.   Neurological: She is alert, oriented to person, place, and time and easily aroused. No cranial nerve deficit or sensory deficit. GCS eye subscore is 4. GCS verbal subscore is 1. GCS motor subscore is 6.   SAHA, no focal deficits noted, follows commands   Skin: Skin is warm and dry. Capillary refill takes 2 to 3 seconds. She is not diaphoretic. No cyanosis. Nails show no clubbing.   Psychiatric: She has a normal mood and affect. Her behavior is normal.   Nursing note and vitals reviewed.      Vents:  Vent Mode: Spont (06/12/19 0746)  Ventilator Initiated: Yes (06/06/19 1416)  Set Rate: 16 bmp (06/11/19 0730)  Vt Set: 400 mL (06/11/19 0730)  Pressure Support: 5 cmH20 (06/12/19 0746)  PEEP/CPAP: 5 cmH20 (06/12/19 0746)  Oxygen Concentration (%): 28 (06/12/19 1331)  Peak Airway Pressure: 11 cmH2O  (06/12/19 0746)  Plateau Pressure: 0 cmH20 (06/12/19 0746)  Total Ve: 4.58 mL (06/12/19 0746)  F/VT Ratio<105 (RSBI): (!) 62.83 (06/12/19 0746)  Lines/Drains/Airways     Peripherally Inserted Central Catheter Line                 PICC Double Lumen 05/24/19 0900 right brachial 19 days          Drain                 Nephrostomy 04/21/19 0629 Left 8 Fr. 52 days         Gastrostomy/Enterostomy 05/02/19 1134 Percutaneous endoscopic gastrostomy (PEG) LUQ decompression;feeding 41 days         Urethral Catheter 06/06/19 1835 16 Fr. 5 days          Airway                 Surgical Airway 06/03/19 1234 Shiley Cuffed 9 days              Significant Labs:    CBC/Anemia Profile:  Recent Labs   Lab 06/11/19  0355 06/12/19  0252   WBC 8.13 8.71   HGB 9.3* 9.5*   HCT 30.8* 32.0*    317   MCV 91 94   RDW 15.4* 15.1*        Chemistries:  Recent Labs   Lab 06/11/19  0355 06/11/19  2257 06/12/19  0254   *  --  147*   K 3.5 3.9 3.8     --  105   CO2 28  --  31*   BUN 42*  --  41*   CREATININE 0.9  --  0.8   CALCIUM 9.2  --  9.3   ALBUMIN 1.8*  --  1.8*   PROT 7.4  --  7.4   BILITOT 0.5  --  0.5   ALKPHOS 128  --  115   ALT 7*  --  7*   AST 18  --  21   MG 2.1 1.9 1.8   PHOS 2.3* 2.9 3.3       All pertinent labs within the past 24 hours have been reviewed.    Significant Imaging:  I have reviewed all pertinent imaging results/findings within the past 24 hours.      ABG  Recent Labs   Lab 06/12/19  0925   PH 7.447   PO2 30*   PCO2 47.3*   HCO3 32.6*   BE 9     Assessment/Plan:     Pulmonary  * Acute on chronic respiratory failure with hypoxia  --Suspect volume overload in the setting of NSTEMI with concomitant pseudomonas pna.  --continue lasix 60 mg TID.   --continue meropenem for 5 day course for pseudomonas aeruginosa (5/19/19 and 6/6/19)  --trach collar during day as tolerated; mechanical ventilation at night.   --bilateral pleural effusions on imaging. Bedside u/s without evidence on R lung and minimal fluid on  L    Pneumonia of both lungs due to Pseudomonas species  --change pip tazo to meropenem for 5 day course  --ID following  --Present on admission.   --see respiratory failure    Cardiac/Vascular  Elevated troponin  --see NSTEMI    (HFpEF) heart failure with preserved ejection fraction  --concern for acute on chronic heart failure exacerbation  --TTE on 05/06 with reduced EF 40-45%, down from 53% on echo from 05/09  --new WMA and grade II diastolic dysfunction and moderate mitral regurgitation  --continuelasix 60 mg daily  --continue medical management lopressor and losartan   --overall negative 5L      Essential hypertension  --on metoprolol and losartan for GDMT         NSTEMI (non-ST elevated myocardial infarction)  --Cardiology following. TTE on 6/7/19 with LVEF 40-45%, grade 2 diastolic dysfunction and segmental wall motion abnormalities. She is currently not a candidate for aggressive cardiology intervention given her anemia requiring pRBC transfusion.   --Troponin peak 5.9  --On aspirin, metoprolol, and losartan.   --change lasix to 60mg daily      CAD (coronary artery disease)  --GINGER to LAD 2013 and 2010.   --See NSTEMI    Oncology  Normocytic anemia  --suspect secondary to prolonged hospitalization with critical illness and recent rectal bleeding.  --hgb 6.4 on 06/07 Transfused 2 units of PRBCs.   --No current evidence of ongoing bleeding.     Endocrine  Type 2 diabetes mellitus, with long-term current use of insulin  --A1c 5.4.    --frequent glucose checks with SSI    Other  Palliative care encounter  --appreciate palliative care        Critical Care Time: 45 minutes  Critical secondary to Patient has a condition that poses threat to life and bodily function: Acute Myocardial Infarction and Acute respiratory failure      Critical care was time spent personally by me on the following activities: development of treatment plan with patient or surrogate and bedside caregivers, discussions with consultants,  evaluation of patient's response to treatment, examination of patient, ordering and performing treatments and interventions, ordering and review of laboratory studies, ordering and review of radiographic studies, pulse oximetry, re-evaluation of patient's condition. This critical care time did not overlap with that of any other provider or involve time for any procedures.     Jaron Iraheta NP  Critical Care Medicine  [facility]

## 2019-06-12 NOTE — PLAN OF CARE
Transportation arranged through [facility] ([id number]) for stretcher transport to [facility].  Pt has been assigned room 270.  Yogi, nurse, will hand off to receiving nurse @ [phone].  Spouse has been notified of transfer time and is agreeable to dc plan.  Transportation has been scheduled for 4:00pm.     06/12/19 1500   Post-Acute Status   Post-Acute Authorization Placement   Post-Acute Placement Status Set-up Complete

## 2019-06-12 NOTE — PT/OT/SLP DISCHARGE
Physical Therapy Discharge Summary    Name: Mariann Huff  MRN: [id number]   Principal Problem: Acute on chronic respiratory failure with hypoxia     Patient Discharged from acute Physical Therapy on 19.  Please refer to prior PT noted date on 6/10/19 for functional status.     Assessment:     Patient was discharged unexpectedly.  Information required to complete an accurate discharge summary is unknown.  Refer to therapy initial evaluation and last progress note for initial and most recent functional status and goal achievement.  Recommendations made may be found in medical record.    Objective:     GOALS:   Multidisciplinary Problems     Physical Therapy Goals        Problem: Physical Therapy Goal    Goal Priority Disciplines Outcome Goal Variances Interventions   Physical Therapy Goal     PT, PT/OT      Description:  Goals to be met by: 19    Patient will increase functional independence with mobility by performin. Supine to sit with Contact Guard Assistance - not met  2. Sit to stand transfer with Contact Guard Assistance with RW -not met  3. Gait  x 100 feet with Contact Guard Assistance using Rolling Walker. -not met  4. Lower extremity exercise program x15 reps with supervision -not met                      Reasons for Discontinuation of Therapy Services  Transfer to alternate level of care.      Plan:     Patient Discharged to: Long Term Acute Care.    Catherine Myers, PT  2019

## 2019-06-13 PROBLEM — N18.2 TYPE 2 DIABETES MELLITUS WITH STAGE 2 CHRONIC KIDNEY DISEASE AND HYPERTENSION: Status: ACTIVE | Noted: 2019-04-20

## 2019-06-13 PROBLEM — I50.41 ACUTE COMBINED SYSTOLIC AND DIASTOLIC HEART FAILURE: Status: ACTIVE | Noted: 2019-06-13

## 2019-06-13 PROBLEM — I12.9 TYPE 2 DIABETES MELLITUS WITH STAGE 2 CHRONIC KIDNEY DISEASE AND HYPERTENSION: Status: ACTIVE | Noted: 2019-04-20

## 2019-06-13 PROBLEM — E11.22 TYPE 2 DIABETES MELLITUS WITH STAGE 2 CHRONIC KIDNEY DISEASE AND HYPERTENSION: Status: ACTIVE | Noted: 2019-04-20

## 2019-06-13 NOTE — PROGRESS NOTES
[facility]  Infectious Disease  Progress Note    Patient Name: Mariann Huff  MRN: [id number]  Admission Date: 6/6/2019  Length of Stay: 6 days  Attending Physician: No att. providers found  Primary Care Provider: Jasbir Haney MD    Isolation Status: No active isolations  Assessment/Plan:      Fever-resolved as of 6/9/2019     60 y/o female well known to ID with HTN, DMII, CAD, NSTEMI, s/p L5-S1 OLIF on 4/12 complicated by intraoperative left ureteral injury s/p ureteroureteral anastomoses and ureteral stent placement who initially presented on 4/20 with fevers, AMS and intraabdominal abscess, s/p washout and ligation of left ureter with nephrostomy tube placement on 4/21.  She  has had a long and complicated hospital course since that time (see HPI), including tracheostomy and PEG tube placement, bilateral upper and lower extremity DVT, s/p IVC filter placement on 5/29, rectal bleeding, rectal ulcer.  She is currently on long term antibiotic therapy for E coli bacteremia and Staph lugdenesis infection (abdominal source) with plan for subsequent suppressive therapy given concern for hardware involvement (end date 6/18/19).  Was treated for HAP (Pseudomonas) and candida glabrata UTI I late May.        ID  Was reconsulted again on 6/2 for recurrent fever.  Workup was unremarkable at that time, she had no recurrence of fever, and she was discharged to rehab on 6/5/19.  Fevers subsequently recurred at rehab and she was sent to ED on 6/6 for repeat imaging.  She was stable on arrival to [facility], but while in ED she became acutely SOB with hypoxemia, became hypotensive.  Was intubated.  Noted to have significant volume overload on CXR, troponins elevated, NSTEMI.  Antibiotics were broadened to vanc and cefepime.  ID re-consulted for antibiotic recommendations.      CTA was negative for PE.  Noted to have bilateral patchy ground glass opacification throughout the lungs; bilateral basilar consolidation overall  improved from prior CTA on 5/8; moderate bilateral pleural effusions. Noted to have  persistent fluid collection along the inferior aspect of her healing midline abdominal incision - 3.6 X 8.2 X 2.5 cm - with surrounding inflammatory changes.       Micro:  Repeat blood cultures NGTD   Mini BAL - pseudomonas  U/A - 27 WBC, no bacteria; urine culture negative    Afebrile today. Leukocytosis resolved.   Fontenot has been exchanged. Nephrostomy tube evaluated. Stitch added.    Blood cultures - NGTD  Respiratory cultures pseudomonas - Intermediate to cefepime and Cipro otherwise sensitive - sensitive to meropenem  CT abd: There is a healing midline abdominal incision with persistent fluid collection along the inferior aspect of the incision measuring 3.6 x 8.2 x 2.5 cm demonstrating surrounding inflammatory changes as well as increased BL pleural effusions.     IR aspiration of fluid collection almost completely drained.  Gram stain with few WBC, no organisms.  Stable non septic  Plan  1.  FU ABD cultures   2.  Continue  Meroepenem but increase to 2g IVq8h as renal function normalized and treating pseudomonas - plan for 5days for presumed PNA - end date 6/13  3.  Continue rifampin  mg twice a day.   4.  Will needs suppression with Cefadroxil 1g po bid and rifampin 300mg bid thru G tube once meropenem completed  5.  Primary team sending to [facility] - consult ID to follow there.                Anticipated Disposition: tbd    Thank you for your consult. I will sign off. Please contact us if you have any additional questions.    POLLY Jerome  Infectious Disease  [facility]    Subjective:     Principal Problem:Acute on chronic respiratory failure with hypoxia    HPI:   Ms. Huff is a 58 y/o female, known to ID throughout prolonged hospital admission,  with HTN, DMII, CAD, NSTEMI, s/p L5-S1 OLIF with NSGY 4/12, complicated by a left ureter transection, repaired with ureteroureteral anastomoses and  ureteral stent placement.   She was discharged with a correa and MADHURI drain that was removed on 4/16.  She was seen by urology and anticipated stent removal in 2-3 months (6/2019).  She presented to ED on 4/20/19  with AMS and sepsis.   An MRI at that time showed postsurgical change of L5-S1 posterior spinal fusion and anterior interbody fusion and a 4.4 cm fluid collection in the left anterior prevertebral soft tissues at the level of the L5-S1 disc space.  A CT showed air collection within the anterior paraspinous soft tissues through which the left ureter courses. Given that the ureter courses through this, communication of the ureter with this collection was not excluded.   She was taken to OR for washout and she underwent ex-lap of left ureter and left nephrostomy tube placement 4/21/19.   Blood cultures were positive for E coli.  Urine cultures +E.coli and OR cultures were + for Staph lugdenensis (pan sensitive).   ID was consulted at that time (see note of 4/22).  There was concern for hardware involvement and she was ultimately treated with ceftriaxone and rifampin with plan for 6 weeks of IV antibiotics (end date 6/4/19) with plan for oral suppressive therapy thereafter.         ID was reconsulted on 4/29/16 for fevers and leukocytosis.   Patient was broadened to Vanc, CTX and Rifampin, and later vanc was stopped.  Source of fevers and leukocytosis were unclear, though abdominal source was suspected.  Blood cultures were negative, CT abd showed a superficial fluid collection, C diff was negative, lines were changed, BAL was negative. She was noted to have a partially occluded thrombus in the RIJ and proximal subclavian.  Drug reaction was considered.  Patient improved and leukocytosis and fever resolved.    ID signed off with plans to complete Cefazolin and rifampin for 6 weeks through 6/4.     ID was again re-consulted on 5/20 for recurrent fever and leukocytosis.  Abdominal drain noted to be draining pus,  she had some rectal bleeding, and CXR showed new left basilar consolidation concerning for HAP.  Endotracheal aspirate showed Pseudomonas. Blood cultures negative. Urine showed candida glabrata.  Antibiotics were broadened to Vancomycin, cefepime, rifampin and fluconazole and lines were exchanged.  A repeat CT abd showed persistent subcutaneous fluid collection noted to be larger, but decreased fat stranding.  She was treated with 7 day course of Cefepime for HAP, a 7 day course of high dose fluconazole for candida glabrata and then transitioned back to cefazolin plus rifampin for prior E Coli and Staph lugdenensis and duration was extended to 8 week with end date of 6/18.      ID  reconsulted again on 6/2 for recurrent fever.  Workup was unremarkable at that time, she had no recurrence of fever, and she was discharged to rehab on 6/5/19.      Fevers subsequently recurred at rehab and she was sent to ED for repeat imaging.  She was stable on arrival to [facility], but while in ED she became acutely SOB with hypoxemia, became hypotensive.  Was intubated.  Noted to have significant volume overload on CXR.  Troponins elevated.      CTA was negative for PE.  Noted to have bilateral patch ground glass opacification throughout the lungs; bilateral basilar consolidation overall improved from prior CTA on 5/8; moderate bilateral pleural effusions; and persistent fluid collection along the inferor aspect of her healing midline abdominal incision - 3.6 X 8.2 X 2.5 cm - with surrounding inflammatory changes.       Micro:  Repeat blood cultures NGTD   Mini BAL - gram stain negative, culture pending  U/A - 27 WBC, no bacteria; urine culture pending  Interval History: No AEON.  Afebrile and WBC WNL.  The patient denies any recent SOB, fever, chills, or sweats.      Review of Systems   Constitutional: Negative for activity change, chills, diaphoresis and fever.   Respiratory: Negative for cough, shortness of breath and wheezing.          + increased sputum - now improved     Cardiovascular: Negative for chest pain.   Gastrointestinal: Negative for abdominal pain, constipation, diarrhea, nausea and vomiting.   Genitourinary: Negative for dysuria, frequency and urgency.   Neurological: Negative for dizziness.   Hematological: Does not bruise/bleed easily.     Objective:     Vital Signs (Most Recent):  Temp: 98.3 °F (36.8 °C) (06/12/19 1600)  Pulse: 74 (06/12/19 1700)  Resp: 15 (06/12/19 1700)  BP: (!) 152/77 (06/12/19 1700)  SpO2: 99 % (06/12/19 1700) Vital Signs (24h Range):  Temp:  [98.1 °F (36.7 °C)-98.7 °F (37.1 °C)] 98.3 °F (36.8 °C)  Pulse:  [66-81] 76  Resp:  [12-39] 24  SpO2:  [91 %-100 %] 100 %  BP: ()/(51-84) 166/84     Weight: 89 kg (196 lb 3.4 oz)  Body mass index is 32.65 kg/m².    Estimated Creatinine Clearance: 84.5 mL/min (based on SCr of 0.8 mg/dL).    Physical Exam   Constitutional: She is oriented to person, place, and time. She appears well-developed and well-nourished. No distress (appears comfortable).   HENT:   Head: Normocephalic and atraumatic.   Tracheostomy in place - no drainage/erythema noted   Eyes: Conjunctivae are normal. Right eye exhibits no discharge. Left eye exhibits no discharge. No scleral icterus.   Cardiovascular: Normal rate and regular rhythm.   Pulmonary/Chest: No stridor. No tachypnea. No respiratory distress. She has no wheezes. She has no rhonchi. She has rales (bases).   Crackles at bases  Coarse BS in upper airway - suspect from trache     Abdominal: Soft. She exhibits no distension and no mass. There is no tenderness. There is no guarding.   Midline surgical  incision healed - c/d/i.   PEG site clear   Genitourinary:   Genitourinary Comments: Nephrostomy tube in place, leaking around insertion site.  No purulence noted.      Fontenot to gravity - urine orange tinged   Musculoskeletal: Normal range of motion. She exhibits no edema or tenderness.   Neurological: She is alert and oriented to person,  place, and time.   Skin: Skin is warm and dry. No rash noted. She is not diaphoretic.   PICC RUE - site clear, non-tender   Psychiatric: She has a normal mood and affect. Her behavior is normal.   Nursing note and vitals reviewed.      Significant Labs:   Blood Culture:   Recent Labs   Lab 05/20/19  1306 06/01/19  1712 06/01/19  1713 06/06/19  1333 06/06/19  1346   LABBLOO No growth after 5 days. No growth after 5 days. No growth after 5 days. No growth after 5 days. No growth after 5 days.     CBC:   Recent Labs   Lab 06/11/19  0355 06/12/19  0252   WBC 8.13 8.71   HGB 9.3* 9.5*   HCT 30.8* 32.0*    317     CMP:   Recent Labs   Lab 06/11/19  0355 06/11/19  2257 06/12/19  0254   *  --  147*   K 3.5 3.9 3.8     --  105   CO2 28  --  31*   *  --  180*   BUN 42*  --  41*   CREATININE 0.9  --  0.8   CALCIUM 9.2  --  9.3   PROT 7.4  --  7.4   ALBUMIN 1.8*  --  1.8*   BILITOT 0.5  --  0.5   ALKPHOS 128  --  115   AST 18  --  21   ALT 7*  --  7*   ANIONGAP 11  --  11   EGFRNONAA >60.0  --  >60.0     Wound Culture:   Recent Labs   Lab 04/21/19  0125 05/19/19  1241   LABAERO STAPHYLOCOCCUS LUGDUNENSIS  Rare   No growth     All pertinent labs within the past 24 hours have been reviewed.    Significant Imaging: I have reviewed all pertinent imaging results/findings within the past 24 hours.   IR Abscess Aspiration [[id number]] Resulted: 06/11/19 1647   Order Status: Sent Updated: 06/11/19 1647   X-Ray Chest AP Portable [[id number]] Resulted: 06/10/19 0910   Order Status: Completed Updated: 06/10/19 0912   Narrative:     EXAMINATION:  XR CHEST AP PORTABLE    CLINICAL HISTORY:  pna;    TECHNIQUE:  Single frontal portable view of the chest was performed.    COMPARISON:  06/06/2019    FINDINGS:  Tracheostomy cannula remains in place with its tip in satisfactory position.  Right-sided PICC again seen with its tip at the cavoatrial junction.  Cardiomegaly, unchanged.  Continued pulmonary vascular congestion  and interstitial/alveolar edema.  There may have been a little improvement in aeration at the right lower lung zone.  No pneumothorax or other detrimental change.   Impression:       As above      Electronically signed by: Denzel Doherty MD  Date: 06/10/2019  Time: 09:10   US Upper Extremity Veins Right [[id number]] (Abnormal) Resulted: 06/07/19 0317   Order Status: Completed Updated: 06/07/19 0319   Narrative:     EXAMINATION:  US UPPER EXTREMITY VEINS LEFT; US UPPER EXTREMITY VEINS RIGHT    CLINICAL HISTORY:  swelling;; pain/swelling;    TECHNIQUE:  Duplex and color flow Doppler evaluation and dynamic compression was performed of the bilateral upper extremity veins.    COMPARISON:  Ultrasound 05/12/2019    FINDINGS:  Right central veins: Central venous catheter identified within the axillary vein.  The internal jugular, subclavian, and axillary veins are patent and free of thrombus.    Right arm veins: Central venous catheter identified within the basilic vein.  There is thrombosis of the cephalic vein.  The brachial, and basilic veins are patent and compressible.    Left central veins: Persistent occlusive thrombus within the axillary vein.  The internal jugular and subclavian veins are patent and free of thrombus.    Left arm veins: Persistent occlusive thrombus within the basilic vein.  The brachial and cephalic veins are patent and compressible.   Impression:       1. Persistent occlusive deep vein thrombosis of the left axillary and basilic veins.  2. Superficial thrombophlebitis of the right cephalic vein.  3. Central venous catheter identified extending from the right basilic vein into the right axillary vein.  This report was flagged in Epic as abnormal.    Electronically signed by resident: Adrián Becker  Date: 06/07/2019  Time: 03:03    Electronically signed by: Pancho Sheikh MD  Date: 06/07/2019  Time: 03:17   US Upper Extremity Veins Left [[id number]] (Abnormal) Resulted: 06/07/19 0317   Order Status:  Completed Updated: 06/07/19 0319   Narrative:     EXAMINATION:  US UPPER EXTREMITY VEINS LEFT; US UPPER EXTREMITY VEINS RIGHT    CLINICAL HISTORY:  swelling;; pain/swelling;    TECHNIQUE:  Duplex and color flow Doppler evaluation and dynamic compression was performed of the bilateral upper extremity veins.    COMPARISON:  Ultrasound 05/12/2019    FINDINGS:  Right central veins: Central venous catheter identified within the axillary vein.  The internal jugular, subclavian, and axillary veins are patent and free of thrombus.    Right arm veins: Central venous catheter identified within the basilic vein.  There is thrombosis of the cephalic vein.  The brachial, and basilic veins are patent and compressible.    Left central veins: Persistent occlusive thrombus within the axillary vein.  The internal jugular and subclavian veins are patent and free of thrombus.    Left arm veins: Persistent occlusive thrombus within the basilic vein.  The brachial and cephalic veins are patent and compressible.   Impression:       1. Persistent occlusive deep vein thrombosis of the left axillary and basilic veins.  2. Superficial thrombophlebitis of the right cephalic vein.  3. Central venous catheter identified extending from the right basilic vein into the right axillary vein.  This report was flagged in Epic as abnormal.    Electronically signed by resident: Adrián Becker  Date: 06/07/2019  Time: 03:03    Electronically signed by: Pancho Sheikh MD  Date: 06/07/2019  Time: 03:17   US Lower Extremity Veins Bilateral [[id number]] (Abnormal) Resulted: 06/07/19 0312   Order Status: Completed Updated: 06/07/19 0314   Narrative:     EXAMINATION:  US LOWER EXTREMITY VEINS BILATERAL    CLINICAL HISTORY:  pain/sweling;    TECHNIQUE:  Duplex and color flow Doppler and dynamic compression was performed of the bilateral lower extremity veins was performed.    COMPARISON:  Ultrasound 05/29/2019, 05/08/2019    FINDINGS:  Right thigh veins:  Partially occlusive thrombus within the common femoral vein with completely occlusive thrombus within the superficial femoral vein extending into the popliteal vein.  The deep femoral, and upper greater saphenous veins are patent and free of thrombus.    Right calf veins: There is occlusive thrombus within the posterior tibial veins and peroneal veins.  The anterior tibial veins are patent.    Left thigh veins: Partially occlusive thrombus within the common femoral vein near the femoral saphenous junction extending into the proximal superficial femoral vein.  The popliteal, upper greater saphenous, and deep femoral veins are patent and free of thrombus.    Left calf veins: There is thrombosis of one of the paired anterior tibial veins, similar to prior.  The posterior tibial veins and peroneal veins are patent.   Impression:       1. Extension of known thrombus within the right lower extremity veins, now extending from the common femoral vein into the superficial femoral, popliteal, posterior tibial, and peroneal veins.  2. Partially occlusive thrombus within the left common femoral vein extending into the proximal superficial femoral vein, not present on the most recent prior ultrasound.  3. Persistent thrombosis of one of the paired left anterior tibial veins.  This report was flagged in Epic as abnormal.    Electronically signed by resident: Adrián Becker  Date: 06/07/2019  Time: 02:54    Electronically signed by: Pancho Sheikh MD  Date: 06/07/2019  Time: 03:12   CT Abdomen Pelvis With Contrast [[id number]] (Abnormal) Resulted: 06/07/19 0131   Order Status: Completed Updated: 06/07/19 0133   Narrative:     EXAMINATION:  CTA CHEST NON CORONARY; CT ABDOMEN PELVIS WITH CONTRAST    CLINICAL HISTORY:  abrupt onset shortness of breath;; bacteremia;    TECHNIQUE:  The patient was surveyed from the lung apices through the pelvis after the administration of 100 cc Omni 350 IV contrast as well as oral contrast and data  was reconstructed for coronal, sagittal, and axial images.  Contrast timing was optimized to evaluate the pulmonary arteries.  MIP images were performed.    COMPARISON:  Radiograph 06/06/2019, 06/02/2019, 05/29/2019; CTA 05/22/2019, 05/08/2019; CT 05/01/2019.    FINDINGS:  Examination of the vascular and soft tissue structures at the base of the neck demonstrate a stable 1.4 cm in hypodensity in the left thyroid lobe.  A right-sided central venous catheter is in place with tip terminating in the superior vena cava.    Left sided aortic arch.  The thoracic aorta maintains normal caliber, contour, and course with mild atherosclerotic calcification within its course.  The heart is not enlarged.  There is calcific atherosclerosis of the coronary arteries.  Minimal pericardial effusion, unchanged.    The pulmonary arteries distribute normally. This study is adequate for the evaluation of pulmonary thromboembolism. There is no filling defect of the pulmonary arteries to suggest pulmonary thromboembolism.    A tracheostomy cannula is in place.  The trachea is midline, the proximal airways are patent, and the lungs are symmetrically expanded.  Patchy ground-glass opacification is present throughout the lungs.  There are consolidative opacities of the lung bases bilaterally, left greater than right, overall improved when compared to CTA 05/08/2019.  No pneumothorax.  There are moderate-sized pleural effusions bilaterally, slightly enlarged when compared to CTA 05/08/2019.    No axillary or mediastinal lymph node enlargement is seen.    The esophagus maintains a normal course and caliber.    The liver is enlarged measuring 22.1 cm.  No focal hepatic abnormality is seen.  The gallbladder is unremarkable.  No intrahepatic or extrahepatic biliary ductal dilatation is noted.  Portal vein, splenic vein, and SMV are patent.    There is a percutaneous gastrostomy tube in place.  The stomach, pancreas, and adrenal glands are  unremarkable.  There are multiple hypoattenuating lesions throughout the spleen, similar to CTA 05/22/2019.    The kidneys are normal in size and location.  Percutaneous left nephrostomy tube remains in stable position within the left lower pole.  No hydronephrosis is seen.  There are stable left renal cysts and additional bilateral subcentimeter renal hypodensities likely representing additional cysts.  Postsurgical changes of left ureteral repair are again noted.  The right ureter appears normal in course and caliber without evidence of ureteral dilatation. The urinary bladder is decompressed around a Fontenot catheter.  The uterus is surgically absent.    No evidence of bowel inflammation or obstruction. No ascites, free fluid, or intraperitoneal free air is identified.    There is no evidence of lymph node enlargement in the abdomen or pelvis.  The abdominal aorta is normal in course and caliber with significant atherosclerotic calcifications.  An IVC filter is in place.    The osseus structures demonstrate postsurgical changes of L5-S1 posterior fusion with disc spacer in place at this level.  There are degenerative changes without evidence of acute fracture or osseus destructive process.    There is a healing midline abdominal incision with persistent fluid collection along the inferior aspect of the incision measuring 3.6 x 8.2 x 2.5 cm demonstrating surrounding inflammatory changes.  There is anasarca.   Impression:       1. No pulmonary thromboembolism identified.  2. Patchy ground-glass opacification throughout the lungs compatible with pulmonary edema versus nonspecific infectious/inflammatory process.  3. Pulmonary consolidation overall improved when compared to CTA 05/08/2019 with persistent opacities the lung bases which may reflect atelectasis, aspiration, or pneumonia.  4. Moderate-sized pleural effusions bilaterally, mildly enlarged when compared to CTA 05/08/2019.  Minimal pericardial effusion,  unchanged.  5. Left-sided percutaneous nephrostomy tube in stable position.  No hydronephrosis.  6. Persistent fluid collection within the anterior abdominal wall along the inferior aspect of the midline abdominal incision which is nonspecific and may represent hematoma, seroma, or abscess.  7. Stable hypoattenuating lesions throughout the spleen which are nonspecific and may represent hematoma, infarct, or less likely abscess.  8. Anasarca.  9. Interval placement of IVC filter.  10. Multiple additional findings as detailed above.  This report was flagged in Epic as abnormal.    Electronically signed by resident: Adrián Becker  Date: 06/07/2019  Time: 00:30    Electronically signed by: Pancho Sheikh MD  Date: 06/07/2019  Time: 01:31   CTA Chest Non-Coronary - PE Study [[id number]] (Abnormal) Resulted: 06/07/19 0131   Order Status: Completed Updated: 06/07/19 0133   Narrative:     EXAMINATION:  CTA CHEST NON CORONARY; CT ABDOMEN PELVIS WITH CONTRAST    CLINICAL HISTORY:  abrupt onset shortness of breath;; bacteremia;    TECHNIQUE:  The patient was surveyed from the lung apices through the pelvis after the administration of 100 cc Omni 350 IV contrast as well as oral contrast and data was reconstructed for coronal, sagittal, and axial images.  Contrast timing was optimized to evaluate the pulmonary arteries.  MIP images were performed.    COMPARISON:  Radiograph 06/06/2019, 06/02/2019, 05/29/2019; CTA 05/22/2019, 05/08/2019; CT 05/01/2019.    FINDINGS:  Examination of the vascular and soft tissue structures at the base of the neck demonstrate a stable 1.4 cm in hypodensity in the left thyroid lobe.  A right-sided central venous catheter is in place with tip terminating in the superior vena cava.    Left sided aortic arch.  The thoracic aorta maintains normal caliber, contour, and course with mild atherosclerotic calcification within its course.  The heart is not enlarged.  There is calcific atherosclerosis of the  coronary arteries.  Minimal pericardial effusion, unchanged.    The pulmonary arteries distribute normally. This study is adequate for the evaluation of pulmonary thromboembolism. There is no filling defect of the pulmonary arteries to suggest pulmonary thromboembolism.    A tracheostomy cannula is in place.  The trachea is midline, the proximal airways are patent, and the lungs are symmetrically expanded.  Patchy ground-glass opacification is present throughout the lungs.  There are consolidative opacities of the lung bases bilaterally, left greater than right, overall improved when compared to CTA 05/08/2019.  No pneumothorax.  There are moderate-sized pleural effusions bilaterally, slightly enlarged when compared to CTA 05/08/2019.    No axillary or mediastinal lymph node enlargement is seen.    The esophagus maintains a normal course and caliber.    The liver is enlarged measuring 22.1 cm.  No focal hepatic abnormality is seen.  The gallbladder is unremarkable.  No intrahepatic or extrahepatic biliary ductal dilatation is noted.  Portal vein, splenic vein, and SMV are patent.    There is a percutaneous gastrostomy tube in place.  The stomach, pancreas, and adrenal glands are unremarkable.  There are multiple hypoattenuating lesions throughout the spleen, similar to CTA 05/22/2019.    The kidneys are normal in size and location.  Percutaneous left nephrostomy tube remains in stable position within the left lower pole.  No hydronephrosis is seen.  There are stable left renal cysts and additional bilateral subcentimeter renal hypodensities likely representing additional cysts.  Postsurgical changes of left ureteral repair are again noted.  The right ureter appears normal in course and caliber without evidence of ureteral dilatation. The urinary bladder is decompressed around a Fontenot catheter.  The uterus is surgically absent.    No evidence of bowel inflammation or obstruction. No ascites, free fluid, or  intraperitoneal free air is identified.    There is no evidence of lymph node enlargement in the abdomen or pelvis.  The abdominal aorta is normal in course and caliber with significant atherosclerotic calcifications.  An IVC filter is in place.    The osseus structures demonstrate postsurgical changes of L5-S1 posterior fusion with disc spacer in place at this level.  There are degenerative changes without evidence of acute fracture or osseus destructive process.    There is a healing midline abdominal incision with persistent fluid collection along the inferior aspect of the incision measuring 3.6 x 8.2 x 2.5 cm demonstrating surrounding inflammatory changes.  There is anasarca.   Impression:       1. No pulmonary thromboembolism identified.  2. Patchy ground-glass opacification throughout the lungs compatible with pulmonary edema versus nonspecific infectious/inflammatory process.  3. Pulmonary consolidation overall improved when compared to CTA 05/08/2019 with persistent opacities the lung bases which may reflect atelectasis, aspiration, or pneumonia.  4. Moderate-sized pleural effusions bilaterally, mildly enlarged when compared to CTA 05/08/2019.  Minimal pericardial effusion, unchanged.  5. Left-sided percutaneous nephrostomy tube in stable position.  No hydronephrosis.  6. Persistent fluid collection within the anterior abdominal wall along the inferior aspect of the midline abdominal incision which is nonspecific and may represent hematoma, seroma, or abscess.  7. Stable hypoattenuating lesions throughout the spleen which are nonspecific and may represent hematoma, infarct, or less likely abscess.  8. Anasarca.  9. Interval placement of IVC filter.  10. Multiple additional findings as detailed above.  This report was flagged in Epic as abnormal.    Electronically signed by resident: Adrián Becker  Date: 06/07/2019  Time: 00:30    Electronically signed by: Pancho Sheikh MD  Date: 06/07/2019  Time: 01:31    X-Ray Abdomen AP 1 View [[id number]] Resulted: 06/06/19 2224   Order Status: Completed Updated: 06/06/19 2226   Narrative:     EXAMINATION:  XR ABDOMEN AP 1 VIEW    CLINICAL HISTORY:  evaluate nephrostomy tube placement;    TECHNIQUE:  AP View(s) of the abdomen was performed.    COMPARISON:  None    FINDINGS:  Two supine views the abdomen presented.  There is a pigtail catheter overlying the left abdomen consistent with percutaneous nephrostomy.    There is L5-S1 spinal fusion hardware.  There appears to be a gastrostomy tube projecting in left upper quadrant.  The bowel gas pattern appears normal.  No pneumatosis or free air.  There is mild reticular opacities at the lung bases.  There is an IVC filter noted.  No fracture or calculus found.   Impression:       Normal bowel gas pattern.      Electronically signed by: Chad Jewell  Date: 06/06/2019  Time: 22:24   X-Ray Chest AP Portable [[id number]] Resulted: 06/06/19 1418   Order Status: Completed Updated: 06/06/19 1420   Narrative:     EXAMINATION:  XR CHEST AP PORTABLE    CLINICAL HISTORY:  Sepsis;    TECHNIQUE:  Single frontal view of the chest was performed.    COMPARISON:  Non 06/06/2019 e    FINDINGS:  Support devices remain.  Ill-defined patchy airspace consolidation and/or edema bilaterally identified without significant changes.  No significant pleural effusion.   Impression:       See above      Electronically signed by: Christian Valencia MD  Date: 06/06/2019  Time: 14:18   Imaging History     2019  Date Procedure Name PACS Link Status Accession Number Location   06/10/19 08:56 AM X-Ray Chest AP Portable  Images Final [id number] [facility]   06/07/19 02:12 AM US Upper Extremity Veins Left  Images Final [id number] [facility]   06/07/19 02:12 AM US Upper Extremity Veins Right  Images Final [id number] [facility]   06/07/19 01:48 AM US Lower Extremity Veins Bilateral  Images Final [id number] [facility]   06/07/19 12:09 AM CTA Chest Non-Coronary - PE Study  Images Final [id number] [facility]    06/07/19 12:09 AM CT Abdomen Pelvis With Contrast  Images Final [id number] [facility]   06/06/19 10:08 PM X-Ray Abdomen AP 1 View  Images Final [id number] [facility]   06/06/19 02:12 PM X-Ray Chest AP Portable  Images Final [id number] [facility]   06/06/19 08:14 AM X-Ray Chest 1 View  Images Final [id number] [facility]   06/02/19 09:30 AM X-Ray Chest 1 View  Images Final [id number] [facility]   05/29/19 05:07 PM IR IVC Filter Placement  Images Final [id number] [facility]   05/29/19 01:51 PM US Lower Extremity Veins Bilateral  Images Final [id number] [facility]   05/29/19 05:10 AM X-Ray Chest AP Portable  Images Final [id number] [facility]   05/28/19 04:44 AM X-Ray Chest AP Portable  Images Final [id number] [facility]   05/27/19 05:55 AM X-Ray Chest AP Portable  Images Final [id number] [facility]   05/25/19 06:53 AM X-Ray Chest AP Portable  Images Final [id number] [facility]   05/24/19 09:31 AM X-Ray Chest 1 View for PICC_Central line  Images Final [id number] [facility]   05/24/19 05:14 AM X-Ray Chest AP Portable  Images Final [id number] [facility]   05/23/19 05:41 AM X-Ray Chest AP Portable  Images Final [id number] [facility]   05/22/19 10:45 PM CTA Abdomen and Pelvis  Images Final [id number] [facility]   05/22/19 08:20 PM X-Ray Chest AP Portable  Images Final [id number] [facility]   05/22/19 01:46 PM X-Ray Chest AP Portable  Images Final [id number] [facility]   05/21/19 10:45 AM X-Ray Chest AP Portable  Images Final [id number] [facility]   05/21/19 06:20 AM X-Ray Chest AP Portable  Images Final [id number] [facility]   05/20/19 05:55 AM X-Ray Chest AP Portable  Images Final [id number] [facility]   05/19/19 02:34 PM X-Ray Chest AP Portable  Images Final [id number] [facility]   05/19/19 05:14 AM X-Ray Chest AP Portable  Images Final [id number] [facility]   05/18/19 04:57 AM X-Ray Chest AP Portable  Images Final [id number] [facility]   05/17/19 08:30 AM X-Ray Chest AP Portable  Images Final [id number] [facility]   05/16/19 05:22 AM X-Ray Chest 1 View  Images Final [id number] [facility]   05/15/19 05:34 AM X-Ray Chest 1 View  Images Final [id number] [facility]   05/14/19 05:51 AM X-Ray Chest 1  View  Images Final [id number] [facility]   05/13/19 03:36 AM X-Ray Chest 1 View  Images Final [id number] [facility]   05/12/19 02:07 PM X-Ray Chest AP Portable  Images Final [id number] [facility]   05/12/19 09:24 AM US Upper Extremity Veins Right  Images Final [id number] [facility]   05/12/19 09:24 AM US Upper Extremity Veins Left  Images Final [id number] [facility]   05/12/19 03:28 AM X-Ray Chest 1 View  Images Final [id number] [facility]   06/06/19 12:00 AM CARDIAC MONITORING STRIPS  Final     06/07/19 10:53 AM Transthoracic echo (TTE) 2D with Color Flow  Final [id number] [facility]

## 2019-06-13 NOTE — SUBJECTIVE & OBJECTIVE
Interval History: No AEON.  Afebrile and WBC WNL.  The patient denies any recent SOB, fever, chills, or sweats.      Review of Systems   Constitutional: Negative for activity change, chills, diaphoresis and fever.   Respiratory: Negative for cough, shortness of breath and wheezing.         + increased sputum - now improved     Cardiovascular: Negative for chest pain.   Gastrointestinal: Negative for abdominal pain, constipation, diarrhea, nausea and vomiting.   Genitourinary: Negative for dysuria, frequency and urgency.   Neurological: Negative for dizziness.   Hematological: Does not bruise/bleed easily.     Objective:     Vital Signs (Most Recent):  Temp: 98.3 °F (36.8 °C) (06/12/19 1600)  Pulse: 74 (06/12/19 1700)  Resp: 15 (06/12/19 1700)  BP: (!) 152/77 (06/12/19 1700)  SpO2: 99 % (06/12/19 1700) Vital Signs (24h Range):  Temp:  [98.1 °F (36.7 °C)-98.7 °F (37.1 °C)] 98.3 °F (36.8 °C)  Pulse:  [66-81] 76  Resp:  [12-39] 24  SpO2:  [91 %-100 %] 100 %  BP: ()/(51-84) 166/84     Weight: 89 kg (196 lb 3.4 oz)  Body mass index is 32.65 kg/m².    Estimated Creatinine Clearance: 84.5 mL/min (based on SCr of 0.8 mg/dL).    Physical Exam   Constitutional: She is oriented to person, place, and time. She appears well-developed and well-nourished. No distress (appears comfortable).   HENT:   Head: Normocephalic and atraumatic.   Tracheostomy in place - no drainage/erythema noted   Eyes: Conjunctivae are normal. Right eye exhibits no discharge. Left eye exhibits no discharge. No scleral icterus.   Cardiovascular: Normal rate and regular rhythm.   Pulmonary/Chest: No stridor. No tachypnea. No respiratory distress. She has no wheezes. She has no rhonchi. She has rales (bases).   Crackles at bases  Coarse BS in upper airway - suspect from trache     Abdominal: Soft. She exhibits no distension and no mass. There is no tenderness. There is no guarding.   Midline surgical  incision healed - c/d/i.   PEG site clear    Genitourinary:   Genitourinary Comments: Nephrostomy tube in place, leaking around insertion site.  No purulence noted.      Fontenot to gravity - urine orange tinged   Musculoskeletal: Normal range of motion. She exhibits no edema or tenderness.   Neurological: She is alert and oriented to person, place, and time.   Skin: Skin is warm and dry. No rash noted. She is not diaphoretic.   PICC RUE - site clear, non-tender   Psychiatric: She has a normal mood and affect. Her behavior is normal.   Nursing note and vitals reviewed.      Significant Labs:   Blood Culture:   Recent Labs   Lab 05/20/19  1306 06/01/19  1712 06/01/19  1713 06/06/19  1333 06/06/19  1346   LABBLOO No growth after 5 days. No growth after 5 days. No growth after 5 days. No growth after 5 days. No growth after 5 days.     CBC:   Recent Labs   Lab 06/11/19  0355 06/12/19  0252   WBC 8.13 8.71   HGB 9.3* 9.5*   HCT 30.8* 32.0*    317     CMP:   Recent Labs   Lab 06/11/19  0355 06/11/19  2257 06/12/19  0254   *  --  147*   K 3.5 3.9 3.8     --  105   CO2 28  --  31*   *  --  180*   BUN 42*  --  41*   CREATININE 0.9  --  0.8   CALCIUM 9.2  --  9.3   PROT 7.4  --  7.4   ALBUMIN 1.8*  --  1.8*   BILITOT 0.5  --  0.5   ALKPHOS 128  --  115   AST 18  --  21   ALT 7*  --  7*   ANIONGAP 11  --  11   EGFRNONAA >60.0  --  >60.0     Wound Culture:   Recent Labs   Lab 04/21/19  0125 05/19/19  1241   LABAERO STAPHYLOCOCCUS LUGDUNENSIS  Rare   No growth     All pertinent labs within the past 24 hours have been reviewed.    Significant Imaging: I have reviewed all pertinent imaging results/findings within the past 24 hours.   IR Abscess Aspiration [[id number]] Resulted: 06/11/19 1647   Order Status: Sent Updated: 06/11/19 1647   X-Ray Chest AP Portable [[id number]] Resulted: 06/10/19 0910   Order Status: Completed Updated: 06/10/19 0912   Narrative:     EXAMINATION:  XR CHEST AP PORTABLE    CLINICAL HISTORY:  pna;    TECHNIQUE:  Single frontal  portable view of the chest was performed.    COMPARISON:  06/06/2019    FINDINGS:  Tracheostomy cannula remains in place with its tip in satisfactory position.  Right-sided PICC again seen with its tip at the cavoatrial junction.  Cardiomegaly, unchanged.  Continued pulmonary vascular congestion and interstitial/alveolar edema.  There may have been a little improvement in aeration at the right lower lung zone.  No pneumothorax or other detrimental change.   Impression:       As above      Electronically signed by: Denzel Doherty MD  Date: 06/10/2019  Time: 09:10   US Upper Extremity Veins Right [[id number]] (Abnormal) Resulted: 06/07/19 0317   Order Status: Completed Updated: 06/07/19 0319   Narrative:     EXAMINATION:  US UPPER EXTREMITY VEINS LEFT; US UPPER EXTREMITY VEINS RIGHT    CLINICAL HISTORY:  swelling;; pain/swelling;    TECHNIQUE:  Duplex and color flow Doppler evaluation and dynamic compression was performed of the bilateral upper extremity veins.    COMPARISON:  Ultrasound 05/12/2019    FINDINGS:  Right central veins: Central venous catheter identified within the axillary vein.  The internal jugular, subclavian, and axillary veins are patent and free of thrombus.    Right arm veins: Central venous catheter identified within the basilic vein.  There is thrombosis of the cephalic vein.  The brachial, and basilic veins are patent and compressible.    Left central veins: Persistent occlusive thrombus within the axillary vein.  The internal jugular and subclavian veins are patent and free of thrombus.    Left arm veins: Persistent occlusive thrombus within the basilic vein.  The brachial and cephalic veins are patent and compressible.   Impression:       1. Persistent occlusive deep vein thrombosis of the left axillary and basilic veins.  2. Superficial thrombophlebitis of the right cephalic vein.  3. Central venous catheter identified extending from the right basilic vein into the right axillary vein.  This  report was flagged in Epic as abnormal.    Electronically signed by resident: Adrián Becker  Date: 06/07/2019  Time: 03:03    Electronically signed by: Pancho Sheikh MD  Date: 06/07/2019  Time: 03:17   US Upper Extremity Veins Left [[id number]] (Abnormal) Resulted: 06/07/19 0317   Order Status: Completed Updated: 06/07/19 0319   Narrative:     EXAMINATION:  US UPPER EXTREMITY VEINS LEFT; US UPPER EXTREMITY VEINS RIGHT    CLINICAL HISTORY:  swelling;; pain/swelling;    TECHNIQUE:  Duplex and color flow Doppler evaluation and dynamic compression was performed of the bilateral upper extremity veins.    COMPARISON:  Ultrasound 05/12/2019    FINDINGS:  Right central veins: Central venous catheter identified within the axillary vein.  The internal jugular, subclavian, and axillary veins are patent and free of thrombus.    Right arm veins: Central venous catheter identified within the basilic vein.  There is thrombosis of the cephalic vein.  The brachial, and basilic veins are patent and compressible.    Left central veins: Persistent occlusive thrombus within the axillary vein.  The internal jugular and subclavian veins are patent and free of thrombus.    Left arm veins: Persistent occlusive thrombus within the basilic vein.  The brachial and cephalic veins are patent and compressible.   Impression:       1. Persistent occlusive deep vein thrombosis of the left axillary and basilic veins.  2. Superficial thrombophlebitis of the right cephalic vein.  3. Central venous catheter identified extending from the right basilic vein into the right axillary vein.  This report was flagged in Epic as abnormal.    Electronically signed by resident: Adrián Becker  Date: 06/07/2019  Time: 03:03    Electronically signed by: Pancho Sheikh MD  Date: 06/07/2019  Time: 03:17   US Lower Extremity Veins Bilateral [[id number]] (Abnormal) Resulted: 06/07/19 0312   Order Status: Completed Updated: 06/07/19 0314   Narrative:     EXAMINATION:  US  LOWER EXTREMITY VEINS BILATERAL    CLINICAL HISTORY:  pain/sweling;    TECHNIQUE:  Duplex and color flow Doppler and dynamic compression was performed of the bilateral lower extremity veins was performed.    COMPARISON:  Ultrasound 05/29/2019, 05/08/2019    FINDINGS:  Right thigh veins: Partially occlusive thrombus within the common femoral vein with completely occlusive thrombus within the superficial femoral vein extending into the popliteal vein.  The deep femoral, and upper greater saphenous veins are patent and free of thrombus.    Right calf veins: There is occlusive thrombus within the posterior tibial veins and peroneal veins.  The anterior tibial veins are patent.    Left thigh veins: Partially occlusive thrombus within the common femoral vein near the femoral saphenous junction extending into the proximal superficial femoral vein.  The popliteal, upper greater saphenous, and deep femoral veins are patent and free of thrombus.    Left calf veins: There is thrombosis of one of the paired anterior tibial veins, similar to prior.  The posterior tibial veins and peroneal veins are patent.   Impression:       1. Extension of known thrombus within the right lower extremity veins, now extending from the common femoral vein into the superficial femoral, popliteal, posterior tibial, and peroneal veins.  2. Partially occlusive thrombus within the left common femoral vein extending into the proximal superficial femoral vein, not present on the most recent prior ultrasound.  3. Persistent thrombosis of one of the paired left anterior tibial veins.  This report was flagged in Epic as abnormal.    Electronically signed by resident: Adrián Becker  Date: 06/07/2019  Time: 02:54    Electronically signed by: Pancho Sheikh MD  Date: 06/07/2019  Time: 03:12   CT Abdomen Pelvis With Contrast [[id number]] (Abnormal) Resulted: 06/07/19 0131   Order Status: Completed Updated: 06/07/19 0133   Narrative:     EXAMINATION:  CTA CHEST  NON CORONARY; CT ABDOMEN PELVIS WITH CONTRAST    CLINICAL HISTORY:  abrupt onset shortness of breath;; bacteremia;    TECHNIQUE:  The patient was surveyed from the lung apices through the pelvis after the administration of 100 cc Omni 350 IV contrast as well as oral contrast and data was reconstructed for coronal, sagittal, and axial images.  Contrast timing was optimized to evaluate the pulmonary arteries.  MIP images were performed.    COMPARISON:  Radiograph 06/06/2019, 06/02/2019, 05/29/2019; CTA 05/22/2019, 05/08/2019; CT 05/01/2019.    FINDINGS:  Examination of the vascular and soft tissue structures at the base of the neck demonstrate a stable 1.4 cm in hypodensity in the left thyroid lobe.  A right-sided central venous catheter is in place with tip terminating in the superior vena cava.    Left sided aortic arch.  The thoracic aorta maintains normal caliber, contour, and course with mild atherosclerotic calcification within its course.  The heart is not enlarged.  There is calcific atherosclerosis of the coronary arteries.  Minimal pericardial effusion, unchanged.    The pulmonary arteries distribute normally. This study is adequate for the evaluation of pulmonary thromboembolism. There is no filling defect of the pulmonary arteries to suggest pulmonary thromboembolism.    A tracheostomy cannula is in place.  The trachea is midline, the proximal airways are patent, and the lungs are symmetrically expanded.  Patchy ground-glass opacification is present throughout the lungs.  There are consolidative opacities of the lung bases bilaterally, left greater than right, overall improved when compared to CTA 05/08/2019.  No pneumothorax.  There are moderate-sized pleural effusions bilaterally, slightly enlarged when compared to CTA 05/08/2019.    No axillary or mediastinal lymph node enlargement is seen.    The esophagus maintains a normal course and caliber.    The liver is enlarged measuring 22.1 cm.  No focal  hepatic abnormality is seen.  The gallbladder is unremarkable.  No intrahepatic or extrahepatic biliary ductal dilatation is noted.  Portal vein, splenic vein, and SMV are patent.    There is a percutaneous gastrostomy tube in place.  The stomach, pancreas, and adrenal glands are unremarkable.  There are multiple hypoattenuating lesions throughout the spleen, similar to CTA 05/22/2019.    The kidneys are normal in size and location.  Percutaneous left nephrostomy tube remains in stable position within the left lower pole.  No hydronephrosis is seen.  There are stable left renal cysts and additional bilateral subcentimeter renal hypodensities likely representing additional cysts.  Postsurgical changes of left ureteral repair are again noted.  The right ureter appears normal in course and caliber without evidence of ureteral dilatation. The urinary bladder is decompressed around a Fontenot catheter.  The uterus is surgically absent.    No evidence of bowel inflammation or obstruction. No ascites, free fluid, or intraperitoneal free air is identified.    There is no evidence of lymph node enlargement in the abdomen or pelvis.  The abdominal aorta is normal in course and caliber with significant atherosclerotic calcifications.  An IVC filter is in place.    The osseus structures demonstrate postsurgical changes of L5-S1 posterior fusion with disc spacer in place at this level.  There are degenerative changes without evidence of acute fracture or osseus destructive process.    There is a healing midline abdominal incision with persistent fluid collection along the inferior aspect of the incision measuring 3.6 x 8.2 x 2.5 cm demonstrating surrounding inflammatory changes.  There is anasarca.   Impression:       1. No pulmonary thromboembolism identified.  2. Patchy ground-glass opacification throughout the lungs compatible with pulmonary edema versus nonspecific infectious/inflammatory process.  3. Pulmonary consolidation  overall improved when compared to CTA 05/08/2019 with persistent opacities the lung bases which may reflect atelectasis, aspiration, or pneumonia.  4. Moderate-sized pleural effusions bilaterally, mildly enlarged when compared to CTA 05/08/2019.  Minimal pericardial effusion, unchanged.  5. Left-sided percutaneous nephrostomy tube in stable position.  No hydronephrosis.  6. Persistent fluid collection within the anterior abdominal wall along the inferior aspect of the midline abdominal incision which is nonspecific and may represent hematoma, seroma, or abscess.  7. Stable hypoattenuating lesions throughout the spleen which are nonspecific and may represent hematoma, infarct, or less likely abscess.  8. Anasarca.  9. Interval placement of IVC filter.  10. Multiple additional findings as detailed above.  This report was flagged in Epic as abnormal.    Electronically signed by resident: Adrián Becker  Date: 06/07/2019  Time: 00:30    Electronically signed by: Pancho Sheikh MD  Date: 06/07/2019  Time: 01:31   CTA Chest Non-Coronary - PE Study [[id number]] (Abnormal) Resulted: 06/07/19 0131   Order Status: Completed Updated: 06/07/19 0133   Narrative:     EXAMINATION:  CTA CHEST NON CORONARY; CT ABDOMEN PELVIS WITH CONTRAST    CLINICAL HISTORY:  abrupt onset shortness of breath;; bacteremia;    TECHNIQUE:  The patient was surveyed from the lung apices through the pelvis after the administration of 100 cc Omni 350 IV contrast as well as oral contrast and data was reconstructed for coronal, sagittal, and axial images.  Contrast timing was optimized to evaluate the pulmonary arteries.  MIP images were performed.    COMPARISON:  Radiograph 06/06/2019, 06/02/2019, 05/29/2019; CTA 05/22/2019, 05/08/2019; CT 05/01/2019.    FINDINGS:  Examination of the vascular and soft tissue structures at the base of the neck demonstrate a stable 1.4 cm in hypodensity in the left thyroid lobe.  A right-sided central venous catheter is in  place with tip terminating in the superior vena cava.    Left sided aortic arch.  The thoracic aorta maintains normal caliber, contour, and course with mild atherosclerotic calcification within its course.  The heart is not enlarged.  There is calcific atherosclerosis of the coronary arteries.  Minimal pericardial effusion, unchanged.    The pulmonary arteries distribute normally. This study is adequate for the evaluation of pulmonary thromboembolism. There is no filling defect of the pulmonary arteries to suggest pulmonary thromboembolism.    A tracheostomy cannula is in place.  The trachea is midline, the proximal airways are patent, and the lungs are symmetrically expanded.  Patchy ground-glass opacification is present throughout the lungs.  There are consolidative opacities of the lung bases bilaterally, left greater than right, overall improved when compared to CTA 05/08/2019.  No pneumothorax.  There are moderate-sized pleural effusions bilaterally, slightly enlarged when compared to CTA 05/08/2019.    No axillary or mediastinal lymph node enlargement is seen.    The esophagus maintains a normal course and caliber.    The liver is enlarged measuring 22.1 cm.  No focal hepatic abnormality is seen.  The gallbladder is unremarkable.  No intrahepatic or extrahepatic biliary ductal dilatation is noted.  Portal vein, splenic vein, and SMV are patent.    There is a percutaneous gastrostomy tube in place.  The stomach, pancreas, and adrenal glands are unremarkable.  There are multiple hypoattenuating lesions throughout the spleen, similar to CTA 05/22/2019.    The kidneys are normal in size and location.  Percutaneous left nephrostomy tube remains in stable position within the left lower pole.  No hydronephrosis is seen.  There are stable left renal cysts and additional bilateral subcentimeter renal hypodensities likely representing additional cysts.  Postsurgical changes of left ureteral repair are again noted.  The  right ureter appears normal in course and caliber without evidence of ureteral dilatation. The urinary bladder is decompressed around a Fontenot catheter.  The uterus is surgically absent.    No evidence of bowel inflammation or obstruction. No ascites, free fluid, or intraperitoneal free air is identified.    There is no evidence of lymph node enlargement in the abdomen or pelvis.  The abdominal aorta is normal in course and caliber with significant atherosclerotic calcifications.  An IVC filter is in place.    The osseus structures demonstrate postsurgical changes of L5-S1 posterior fusion with disc spacer in place at this level.  There are degenerative changes without evidence of acute fracture or osseus destructive process.    There is a healing midline abdominal incision with persistent fluid collection along the inferior aspect of the incision measuring 3.6 x 8.2 x 2.5 cm demonstrating surrounding inflammatory changes.  There is anasarca.   Impression:       1. No pulmonary thromboembolism identified.  2. Patchy ground-glass opacification throughout the lungs compatible with pulmonary edema versus nonspecific infectious/inflammatory process.  3. Pulmonary consolidation overall improved when compared to CTA 05/08/2019 with persistent opacities the lung bases which may reflect atelectasis, aspiration, or pneumonia.  4. Moderate-sized pleural effusions bilaterally, mildly enlarged when compared to CTA 05/08/2019.  Minimal pericardial effusion, unchanged.  5. Left-sided percutaneous nephrostomy tube in stable position.  No hydronephrosis.  6. Persistent fluid collection within the anterior abdominal wall along the inferior aspect of the midline abdominal incision which is nonspecific and may represent hematoma, seroma, or abscess.  7. Stable hypoattenuating lesions throughout the spleen which are nonspecific and may represent hematoma, infarct, or less likely abscess.  8. Anasarca.  9. Interval placement of IVC  filter.  10. Multiple additional findings as detailed above.  This report was flagged in Epic as abnormal.    Electronically signed by resident: Adrián Becker  Date: 06/07/2019  Time: 00:30    Electronically signed by: Pancho Sheikh MD  Date: 06/07/2019  Time: 01:31   X-Ray Abdomen AP 1 View [[id number]] Resulted: 06/06/19 2224   Order Status: Completed Updated: 06/06/19 2226   Narrative:     EXAMINATION:  XR ABDOMEN AP 1 VIEW    CLINICAL HISTORY:  evaluate nephrostomy tube placement;    TECHNIQUE:  AP View(s) of the abdomen was performed.    COMPARISON:  None    FINDINGS:  Two supine views the abdomen presented.  There is a pigtail catheter overlying the left abdomen consistent with percutaneous nephrostomy.    There is L5-S1 spinal fusion hardware.  There appears to be a gastrostomy tube projecting in left upper quadrant.  The bowel gas pattern appears normal.  No pneumatosis or free air.  There is mild reticular opacities at the lung bases.  There is an IVC filter noted.  No fracture or calculus found.   Impression:       Normal bowel gas pattern.      Electronically signed by: Chad Jewell  Date: 06/06/2019  Time: 22:24   X-Ray Chest AP Portable [[id number]] Resulted: 06/06/19 1418   Order Status: Completed Updated: 06/06/19 1420   Narrative:     EXAMINATION:  XR CHEST AP PORTABLE    CLINICAL HISTORY:  Sepsis;    TECHNIQUE:  Single frontal view of the chest was performed.    COMPARISON:  Non 06/06/2019 e    FINDINGS:  Support devices remain.  Ill-defined patchy airspace consolidation and/or edema bilaterally identified without significant changes.  No significant pleural effusion.   Impression:       See above      Electronically signed by: Christian Valencia MD  Date: 06/06/2019  Time: 14:18   Imaging History     2019  Date Procedure Name PACS Link Status Accession Number Location   06/10/19 08:56 AM X-Ray Chest AP Portable  Images Final [id number]   06/07/19 02:12 AM US Upper Extremity Veins Left  Images Final  [id number] [facility]   06/07/19 02:12 AM US Upper Extremity Veins Right  Images Final [id number] [facility]   06/07/19 01:48 AM US Lower Extremity Veins Bilateral  Images Final [id number] [facility]   06/07/19 12:09 AM CTA Chest Non-Coronary - PE Study  Images Final [id number] [facility]   06/07/19 12:09 AM CT Abdomen Pelvis With Contrast  Images Final [id number] [facility]   06/06/19 10:08 PM X-Ray Abdomen AP 1 View  Images Final [id number] [facility]   06/06/19 02:12 PM X-Ray Chest AP Portable  Images Final [id number] [facility]   06/06/19 08:14 AM X-Ray Chest 1 View  Images Final [id number] [facility]   06/02/19 09:30 AM X-Ray Chest 1 View  Images Final [id number] [facility]   05/29/19 05:07 PM IR IVC Filter Placement  Images Final [id number] [facility]   05/29/19 01:51 PM US Lower Extremity Veins Bilateral  Images Final [id number] [facility]   05/29/19 05:10 AM X-Ray Chest AP Portable  Images Final [id number] [facility]   05/28/19 04:44 AM X-Ray Chest AP Portable  Images Final [id number] [facility]   05/27/19 05:55 AM X-Ray Chest AP Portable  Images Final [id number] [facility]   05/25/19 06:53 AM X-Ray Chest AP Portable  Images Final [id number] [facility]   05/24/19 09:31 AM X-Ray Chest 1 View for PICC_Central line  Images Final [id number] [facility]   05/24/19 05:14 AM X-Ray Chest AP Portable  Images Final [id number] [facility]   05/23/19 05:41 AM X-Ray Chest AP Portable  Images Final [id number] [facility]   05/22/19 10:45 PM CTA Abdomen and Pelvis  Images Final [id number] [facility]   05/22/19 08:20 PM X-Ray Chest AP Portable  Images Final [id number] [facility]   05/22/19 01:46 PM X-Ray Chest AP Portable  Images Final [id number] [facility]   05/21/19 10:45 AM X-Ray Chest AP Portable  Images Final [id number] [facility]   05/21/19 06:20 AM X-Ray Chest AP Portable  Images Final [id number] [facility]   05/20/19 05:55 AM X-Ray Chest AP Portable  Images Final [id number] [facility]   05/19/19 02:34 PM X-Ray Chest AP Portable  Images Final [id number] [facility]   05/19/19 05:14 AM X-Ray Chest AP Portable  Images Final [id number] [facility]   05/18/19 04:57 AM X-Ray Chest AP Portable  Images  Final [id number] [facility]   05/17/19 08:30 AM X-Ray Chest AP Portable  Images Final [id number] [facility]   05/16/19 05:22 AM X-Ray Chest 1 View  Images Final [id number] [facility]   05/15/19 05:34 AM X-Ray Chest 1 View  Images Final [id number] [facility]   05/14/19 05:51 AM X-Ray Chest 1 View  Images Final [id number] [facility]   05/13/19 03:36 AM X-Ray Chest 1 View  Images Final [id number] [facility]   05/12/19 02:07 PM X-Ray Chest AP Portable  Images Final [id number] [facility]   05/12/19 09:24 AM US Upper Extremity Veins Right  Images Final [id number] [facility]   05/12/19 09:24 AM US Upper Extremity Veins Left  Images Final [id number] [facility]   05/12/19 03:28 AM X-Ray Chest 1 View  Images Final [id number] [facility]   06/06/19 12:00 AM CARDIAC MONITORING STRIPS  Final     06/07/19 10:53 AM Transthoracic echo (TTE) 2D with Color Flow  Final [id number] [facility]

## 2019-06-13 NOTE — ASSESSMENT & PLAN NOTE
58 y/o female well known to ID with HTN, DMII, CAD, NSTEMI, s/p L5-S1 OLIF on 4/12 complicated by intraoperative left ureteral injury s/p ureteroureteral anastomoses and ureteral stent placement who initially presented on 4/20 with fevers, AMS and intraabdominal abscess, s/p washout and ligation of left ureter with nephrostomy tube placement on 4/21.  She  has had a long and complicated hospital course since that time (see HPI), including tracheostomy and PEG tube placement, bilateral upper and lower extremity DVT, s/p IVC filter placement on 5/29, rectal bleeding, rectal ulcer.  She is currently on long term antibiotic therapy for E coli bacteremia and Staph lugdenesis infection (abdominal source) with plan for subsequent suppressive therapy given concern for hardware involvement (end date 6/18/19).  Was treated for HAP (Pseudomonas) and candida glabrata UTI I late May.        ID  Was reconsulted again on 6/2 for recurrent fever.  Workup was unremarkable at that time, she had no recurrence of fever, and she was discharged to rehab on 6/5/19.  Fevers subsequently recurred at rehab and she was sent to ED on 6/6 for repeat imaging.  She was stable on arrival to [facility], but while in ED she became acutely SOB with hypoxemia, became hypotensive.  Was intubated.  Noted to have significant volume overload on CXR, troponins elevated, NSTEMI.  Antibiotics were broadened to vanc and cefepime.  ID re-consulted for antibiotic recommendations.      CTA was negative for PE.  Noted to have bilateral patchy ground glass opacification throughout the lungs; bilateral basilar consolidation overall improved from prior CTA on 5/8; moderate bilateral pleural effusions. Noted to have  persistent fluid collection along the inferior aspect of her healing midline abdominal incision - 3.6 X 8.2 X 2.5 cm - with surrounding inflammatory changes.       Micro:  Repeat blood cultures NGTD   Mini BAL - pseudomonas  U/A - 27 WBC, no bacteria; urine  culture negative    Afebrile today. Leukocytosis resolved.   Fontenot has been exchanged. Nephrostomy tube evaluated. Stitch added.    Blood cultures - NGTD  Respiratory cultures pseudomonas - Intermediate to cefepime and Cipro otherwise sensitive - sensitive to meropenem  CT abd: There is a healing midline abdominal incision with persistent fluid collection along the inferior aspect of the incision measuring 3.6 x 8.2 x 2.5 cm demonstrating surrounding inflammatory changes as well as increased BL pleural effusions.     IR aspiration of fluid collection almost completely drained.  Gram stain with few WBC, no organisms.  Stable non septic  Plan  1.  FU ABD cultures   2.  Continue  Meroepenem but increase to 2g IVq8h as renal function normalized and treating pseudomonas - plan for 5days for presumed PNA - end date 6/13  3.  Continue rifampin  mg twice a day.   4.  Will needs suppression with Cefadroxil 1g po bid and rifampin 300mg bid thru G tube once meropenem completed  5.  Primary team sending to LTAC - consult ID to follow there.

## 2019-06-14 PROBLEM — E67.8 EXCESSIVE CARBOHYDRATE INTAKE: Status: ACTIVE | Noted: 2019-06-14

## 2019-06-14 NOTE — PHYSICIAN QUERY
PT Name: Mariann Huff  MR #: [id number]     Physician Query Form - Documentation Clarification      CDS/: Argelia Garza               Contact information:[email or web address]  This form is a permanent document in the medical record.     Query Date: June 14, 2019    By submitting this query, we are merely seeking further clarification of documentation. Please utilize your independent clinical judgment when addressing the question(s) below.    The Medical record reflects the following:    Supporting Clinical Findings Location in Medical Record     recent prolonged hospitalization for sepsis 2/2 bacteremia from intra-abdominal abscess     She is febrile, tachycardic and borderline hypotensive with mildly elevated leukocytosis    Unfortunately with her active blood loss anemia and sepsis, she is not a candidate for aggressive intervention at this time      With NSTEMI, suspected ischemia with stress, severe pulmonary edema, &tracheostomy, abdominal sepsis, will benefit from LTAC level of care during recovery phase of this very complex illness.       She was followed by ID during admission and is being treated with long term antibiotic therapy for E coli and Staph lugdenesis bacteremia with IV cefazolin and rifampin with end dated 6/18/19 given hardware      persistent fluid collection along the inferor aspect of her healing midline abdominal incision - 3.6 X 8.2 X 2.5 cm - with surrounding inflammatory changes.            Cards CN 6/6          Cards PN 6/9            CCM PN 6/11                H&P 6/6                ID PN 6/11        WBC = 13.54  Lactate = 1.8      HR = 111---->123  RR = 22----->36  Temp = 98.5      On arrival to [facility] patient was initially afebrile, normotensive (130/60s), and tachycardic (110-120s), and saturating 100% on vent via trach collar (15 L/min 50% FiO2). Patient later noted to be febrile (101 F) and hypotensive (80s/40s).     Labs showed slightly elevated WBC 13.5 (from 11.5  from 12.9 during prior admit). Hemoglobin relatively stable at 7.8 from 8.3. Renal function panel shows PAPA (Cr 1.4 from 0.8).      Labs 6/6        V/S Flowsheet 6/6          Cards CN 6/6                                                                            Doctor, Please clarify if sepsis is applicable to this admission.    Provider Use Only      [ x  ]  Sepsis was POA and is a valid diagnosis for this admission    [   ]  Sepsis was not POA, but is a valid diagnosis for this admission    [   ]  History of Sepsis    [   ]  Other explanations with details__________________________________                                                                                                               [  ] Clinically Undetermined

## 2019-06-14 NOTE — PHYSICIAN QUERY
PT Name: Mariann Huff  MR #: [id number]    Physician Query Form - Relationship to Procedure Clarification     CDS/: Argelia Garza               Contact information: [email or web address]    This form is a permanent document in the medical record.     Query Date: June 14, 2019      By submitting this query, we are merely seeking further clarification of documentation. Please utilize your independent clinical judgment when addressing the question(s) below.    The Medical record contains the following:  Supporting Clinical Findings Location in Medical Record   Abscess aspiration   Successful US guided superficial fluid collection aspiration. 11 mL serosanguinous fluid removed. Near complete resolution noted. No complications.       persistent fluid collection along the inferor aspect of her healing midline abdominal incision - 3.6 X 8.2 X 2.5 cm - with surrounding inflammatory changes.       4/12/19-4/14/19 for L5-S1 OLIF with Neurosurgery with intraoperaative left ureteral injury with ureteroureteral &anastomosis and ureteral stent placement. Second admission, 4/20/19-6/5/19 for intraabdominal abscess s/p washout and ligation of left ureter with nephrostomy tube placement on 4/21/19 and &tracheostomy and PEG placement on 5/2/19   Procedure note 6/11            ID PN 6/11          H&P 6/6       Please clarify if _Abscess__ is      [  ] Inherent/Integral to procedure   [ x ] Present, but not a complication of the procedure   [  ] Incidental finding, not clinically significant   [  ] Complication of procedure   [  ] Other (please specify): __________________   [  ] Clinically Undetermined

## 2019-06-14 NOTE — PT/OT/SLP DISCHARGE
Occupational Therapy Discharge Summary    Mariann Huff  MRN: [id number]   Principal Problem: Acute on chronic respiratory failure with hypoxia      Patient Discharged from acute Occupational Therapy on 6/12/19.  s.    Assessment:      Patient was discharged unexpectedly.  Information required to complete an accurate discharge summary is unknown.  Refer to therapy initial evaluation and last progress note for initial and most recent functional status and goal achievement.  Recommendations made may be found in medical record.    Objective:     GOALS:   Multidisciplinary Problems     Occupational Therapy Goals        Problem: Occupational Therapy Goal    Goal Priority Disciplines Outcome Interventions   Occupational Therapy Goal     OT, PT/OT Ongoing (interventions implemented as appropriate)    Description:  Goals to be met by: 6/20/19     Patient will increase functional independence with ADLs by performing:    Feeding with Modified [place].  UE Dressing with SBA.  LE Dressing with Minimal Assistance.  Grooming while standing at sink with Stand-by Assistance.  Toileting from toilet with Contact Guard Assistance for hygiene and clothing management.   Toilet transfer to bedside commode with Contact Guard Assistance.                      Reasons for Discontinuation of Therapy Services  Transfer to alternate level of care.      Plan:     Patient Discharged to: Long Term Acute Care    PRIMITIVO Dash  6/14/2019

## 2019-06-15 LAB — BACTERIA SPEC AEROBE CULT: NO GROWTH

## 2019-06-19 PROBLEM — J15.1 PNEUMONIA OF BOTH LUNGS DUE TO PSEUDOMONAS SPECIES: Status: RESOLVED | Noted: 2019-06-09 | Resolved: 2019-06-19

## 2019-06-19 LAB — BACTERIA SPEC ANAEROBE CULT: NORMAL

## 2019-06-20 ENCOUNTER — TELEPHONE (OUTPATIENT)
Dept: UROLOGY | Facility: CLINIC | Age: 59
End: 2019-06-20

## 2019-06-20 NOTE — TELEPHONE ENCOUNTER
Patient is off the vent, but currently still in ICU, she is expected to be transferred to rehab unit in 2-3 weeks, I rescheduled her stent removal for 7/11. If she is not stable at that time we can reschedule

## 2019-06-20 NOTE — TELEPHONE ENCOUNTER
----- Message from Greta Glass sent at 6/20/2019 12:54 PM CDT -----  Contact: Jacy  [email or web address]  Needs Advice    Reason for call: How soon after pt is discharge do you want her to follow up with you and when do you want to do the cysto        Communication Preference: [phone]    Additional Information:

## 2019-06-26 LAB — FUNGUS SPEC CULT: NORMAL

## 2019-07-02 PROBLEM — I50.42 CHRONIC COMBINED SYSTOLIC AND DIASTOLIC CHF (CONGESTIVE HEART FAILURE): Status: ACTIVE | Noted: 2019-07-02

## 2019-07-02 PROBLEM — L30.8 DERMATITIS ASSOCIATED WITH MOISTURE: Status: RESOLVED | Noted: 2019-05-06 | Resolved: 2019-07-02

## 2019-07-02 PROBLEM — I50.41 ACUTE COMBINED SYSTOLIC AND DIASTOLIC HEART FAILURE: Status: RESOLVED | Noted: 2019-06-13 | Resolved: 2019-07-02

## 2019-07-02 PROBLEM — J96.21 ACUTE ON CHRONIC RESPIRATORY FAILURE WITH HYPOXIA: Status: RESOLVED | Noted: 2019-06-06 | Resolved: 2019-07-02

## 2019-07-02 PROBLEM — E67.8 EXCESSIVE CARBOHYDRATE INTAKE: Status: RESOLVED | Noted: 2019-06-14 | Resolved: 2019-07-02

## 2019-07-03 PROCEDURE — G0180 PR HOME HEALTH MD CERTIFICATION: ICD-10-PCS | Mod: ,,, | Performed by: INTERNAL MEDICINE

## 2019-07-03 PROCEDURE — G0180 MD CERTIFICATION HHA PATIENT: HCPCS | Mod: ,,, | Performed by: INTERNAL MEDICINE

## 2019-07-08 ENCOUNTER — TELEPHONE (OUTPATIENT)
Dept: NEUROSURGERY | Facility: CLINIC | Age: 59
End: 2019-07-08

## 2019-07-08 NOTE — TELEPHONE ENCOUNTER
----- Message from Raul De Luna sent at 7/8/2019  2:37 PM CDT -----  Contact: [phone]/self  Patient is returning your call.  Please call back to assist at [phone]

## 2019-07-08 NOTE — TELEPHONE ENCOUNTER
----- Message from Nicolasa Mead sent at 7/8/2019 10:55 AM CDT -----  Contact: [phone] self   Pt is requesting to be seen sooner than the next available appt for  Hospital follow up. Please advise.

## 2019-07-10 ENCOUNTER — TELEPHONE (OUTPATIENT)
Dept: INFECTIOUS DISEASES | Facility: CLINIC | Age: 59
End: 2019-07-10

## 2019-07-11 ENCOUNTER — OFFICE VISIT (OUTPATIENT)
Dept: SURGERY | Facility: CLINIC | Age: 59
End: 2019-07-11
Payer: MEDICARE

## 2019-07-11 ENCOUNTER — HOSPITAL ENCOUNTER (OUTPATIENT)
Dept: UROLOGY | Facility: HOSPITAL | Age: 59
Discharge: HOME | End: 2019-07-11
Attending: UROLOGY
Payer: MEDICARE

## 2019-07-11 VITALS
DIASTOLIC BLOOD PRESSURE: 81 MMHG | TEMPERATURE: 98 F | BODY MASS INDEX: 21.16 KG/M2 | SYSTOLIC BLOOD PRESSURE: 175 MMHG | WEIGHT: 127 LBS | HEART RATE: 90 BPM | HEIGHT: 65 IN

## 2019-07-11 DIAGNOSIS — Z96.0 URETERAL STENT RETAINED: ICD-10-CM

## 2019-07-11 DIAGNOSIS — Z43.1 PEG (PERCUTANEOUS ENDOSCOPIC GASTROSTOMY) ADJUSTMENT/REPLACEMENT/REMOVAL: Primary | ICD-10-CM

## 2019-07-11 PROBLEM — Z93.6 NEPHROSTOMY STATUS: Status: ACTIVE | Noted: 2019-07-11

## 2019-07-11 PROCEDURE — 99024 PR POST-OP FOLLOW-UP VISIT: ICD-10-PCS | Mod: S$GLB,,, | Performed by: PHYSICIAN ASSISTANT

## 2019-07-11 PROCEDURE — 99999 PR PBB SHADOW E&M-EST. PATIENT-LVL IV: ICD-10-PCS | Mod: PBBFAC,,, | Performed by: PHYSICIAN ASSISTANT

## 2019-07-11 PROCEDURE — 99024 POSTOP FOLLOW-UP VISIT: CPT | Mod: S$GLB,,, | Performed by: PHYSICIAN ASSISTANT

## 2019-07-11 PROCEDURE — 99999 PR PBB SHADOW E&M-EST. PATIENT-LVL IV: CPT | Mod: PBBFAC,,, | Performed by: PHYSICIAN ASSISTANT

## 2019-07-12 ENCOUNTER — LAB VISIT (OUTPATIENT)
Dept: LAB | Facility: HOSPITAL | Age: 59
End: 2019-07-12
Attending: INTERNAL MEDICINE
Payer: MEDICARE

## 2019-07-12 ENCOUNTER — OFFICE VISIT (OUTPATIENT)
Dept: INTERNAL MEDICINE | Facility: CLINIC | Age: 59
End: 2019-07-12
Payer: MEDICARE

## 2019-07-12 VITALS
SYSTOLIC BLOOD PRESSURE: 130 MMHG | TEMPERATURE: 98 F | DIASTOLIC BLOOD PRESSURE: 80 MMHG | WEIGHT: 135.81 LBS | BODY MASS INDEX: 22.63 KG/M2 | RESPIRATION RATE: 18 BRPM | HEIGHT: 65 IN | HEART RATE: 90 BPM

## 2019-07-12 DIAGNOSIS — I50.30 (HFPEF) HEART FAILURE WITH PRESERVED EJECTION FRACTION: Chronic | ICD-10-CM

## 2019-07-12 DIAGNOSIS — I82.411 ACUTE DEEP VEIN THROMBOSIS (DVT) OF FEMORAL VEIN OF RIGHT LOWER EXTREMITY: Chronic | ICD-10-CM

## 2019-07-12 DIAGNOSIS — T81.40XD POSTOPERATIVE INFECTION, UNSPECIFIED TYPE, SUBSEQUENT ENCOUNTER: ICD-10-CM

## 2019-07-12 DIAGNOSIS — K92.2 GASTROINTESTINAL HEMORRHAGE, UNSPECIFIED GASTROINTESTINAL HEMORRHAGE TYPE: ICD-10-CM

## 2019-07-12 DIAGNOSIS — I25.10 CORONARY ARTERY DISEASE INVOLVING NATIVE CORONARY ARTERY OF NATIVE HEART WITHOUT ANGINA PECTORIS: Chronic | ICD-10-CM

## 2019-07-12 DIAGNOSIS — Z09 HOSPITAL DISCHARGE FOLLOW-UP: ICD-10-CM

## 2019-07-12 DIAGNOSIS — K62.6 RECTAL ULCER: ICD-10-CM

## 2019-07-12 DIAGNOSIS — B95.8 STAPH INFECTION: ICD-10-CM

## 2019-07-12 DIAGNOSIS — I50.42 CHRONIC COMBINED SYSTOLIC AND DIASTOLIC CHF (CONGESTIVE HEART FAILURE): ICD-10-CM

## 2019-07-12 DIAGNOSIS — I21.4 NSTEMI (NON-ST ELEVATED MYOCARDIAL INFARCTION): ICD-10-CM

## 2019-07-12 DIAGNOSIS — Z09 HOSPITAL DISCHARGE FOLLOW-UP: Primary | ICD-10-CM

## 2019-07-12 DIAGNOSIS — A41.9 SEPSIS, DUE TO UNSPECIFIED ORGANISM: ICD-10-CM

## 2019-07-12 DIAGNOSIS — S37.10XD: Chronic | ICD-10-CM

## 2019-07-12 DIAGNOSIS — Z93.6 NEPHROSTOMY STATUS: ICD-10-CM

## 2019-07-12 DIAGNOSIS — Z93.1 STATUS POST INSERTION OF PERCUTANEOUS ENDOSCOPIC GASTROSTOMY (PEG) TUBE: ICD-10-CM

## 2019-07-12 PROBLEM — Z78.9 ON ENTERAL NUTRITION: Status: RESOLVED | Noted: 2019-04-24 | Resolved: 2019-07-12

## 2019-07-12 PROBLEM — S37.10XA: Chronic | Status: ACTIVE | Noted: 2019-04-13

## 2019-07-12 PROBLEM — Z91.89 AT RISK FOR FLUID VOLUME OVERLOAD: Status: RESOLVED | Noted: 2019-04-21 | Resolved: 2019-07-12

## 2019-07-12 PROBLEM — G93.40 ENCEPHALOPATHY: Status: RESOLVED | Noted: 2019-04-20 | Resolved: 2019-07-12

## 2019-07-12 PROBLEM — J45.909 ASTHMA: Chronic | Status: ACTIVE | Noted: 2019-04-20

## 2019-07-12 LAB
ALBUMIN SERPL BCP-MCNC: 2.9 G/DL (ref 3.5–5.2)
ALP SERPL-CCNC: 92 U/L (ref 55–135)
ALT SERPL W/O P-5'-P-CCNC: 7 U/L (ref 10–44)
ANION GAP SERPL CALC-SCNC: 13 MMOL/L (ref 8–16)
AST SERPL-CCNC: 11 U/L (ref 10–40)
BASOPHILS # BLD AUTO: 0.03 K/UL (ref 0–0.2)
BASOPHILS NFR BLD: 0.4 % (ref 0–1.9)
BILIRUB SERPL-MCNC: 0.3 MG/DL (ref 0.1–1)
BUN SERPL-MCNC: 14 MG/DL (ref 6–20)
CALCIUM SERPL-MCNC: 9.9 MG/DL (ref 8.7–10.5)
CHLORIDE SERPL-SCNC: 104 MMOL/L (ref 95–110)
CO2 SERPL-SCNC: 28 MMOL/L (ref 23–29)
CREAT SERPL-MCNC: 0.7 MG/DL (ref 0.5–1.4)
DIFFERENTIAL METHOD: ABNORMAL
EOSINOPHIL # BLD AUTO: 0.2 K/UL (ref 0–0.5)
EOSINOPHIL NFR BLD: 3.3 % (ref 0–8)
ERYTHROCYTE [DISTWIDTH] IN BLOOD BY AUTOMATED COUNT: 13.3 % (ref 11.5–14.5)
EST. GFR  (AFRICAN AMERICAN): >60 ML/MIN/1.73 M^2
EST. GFR  (NON AFRICAN AMERICAN): >60 ML/MIN/1.73 M^2
GLUCOSE SERPL-MCNC: 157 MG/DL (ref 70–110)
HCT VFR BLD AUTO: 34 % (ref 37–48.5)
HGB BLD-MCNC: 10.3 G/DL (ref 12–16)
IMM GRANULOCYTES # BLD AUTO: 0.01 K/UL (ref 0–0.04)
IMM GRANULOCYTES NFR BLD AUTO: 0.1 % (ref 0–0.5)
LYMPHOCYTES # BLD AUTO: 1.9 K/UL (ref 1–4.8)
LYMPHOCYTES NFR BLD: 26.1 % (ref 18–48)
MCH RBC QN AUTO: 27.1 PG (ref 27–31)
MCHC RBC AUTO-ENTMCNC: 30.3 G/DL (ref 32–36)
MCV RBC AUTO: 90 FL (ref 82–98)
MONOCYTES # BLD AUTO: 0.5 K/UL (ref 0.3–1)
MONOCYTES NFR BLD: 7.2 % (ref 4–15)
NEUTROPHILS # BLD AUTO: 4.5 K/UL (ref 1.8–7.7)
NEUTROPHILS NFR BLD: 62.9 % (ref 38–73)
NRBC BLD-RTO: 0 /100 WBC
PLATELET # BLD AUTO: 341 K/UL (ref 150–350)
PMV BLD AUTO: 10.3 FL (ref 9.2–12.9)
POTASSIUM SERPL-SCNC: 3.3 MMOL/L (ref 3.5–5.1)
PROT SERPL-MCNC: 7.9 G/DL (ref 6–8.4)
RBC # BLD AUTO: 3.8 M/UL (ref 4–5.4)
SODIUM SERPL-SCNC: 145 MMOL/L (ref 136–145)
WBC # BLD AUTO: 7.23 K/UL (ref 3.9–12.7)

## 2019-07-12 PROCEDURE — 99999 PR PBB SHADOW E&M-EST. PATIENT-LVL IV: ICD-10-PCS | Mod: PBBFAC,,, | Performed by: INTERNAL MEDICINE

## 2019-07-12 PROCEDURE — 85025 COMPLETE CBC W/AUTO DIFF WBC: CPT

## 2019-07-12 PROCEDURE — 99214 PR OFFICE/OUTPT VISIT, EST, LEVL IV, 30-39 MIN: ICD-10-PCS | Mod: S$GLB,,, | Performed by: INTERNAL MEDICINE

## 2019-07-12 PROCEDURE — 36415 COLL VENOUS BLD VENIPUNCTURE: CPT | Mod: PO

## 2019-07-12 PROCEDURE — 99214 OFFICE O/P EST MOD 30 MIN: CPT | Mod: S$GLB,,, | Performed by: INTERNAL MEDICINE

## 2019-07-12 PROCEDURE — 99999 PR PBB SHADOW E&M-EST. PATIENT-LVL IV: CPT | Mod: PBBFAC,,, | Performed by: INTERNAL MEDICINE

## 2019-07-12 PROCEDURE — 80053 COMPREHEN METABOLIC PANEL: CPT

## 2019-07-12 RX ORDER — AMLODIPINE BESYLATE 10 MG/1
10 TABLET ORAL
COMMUNITY
End: 2020-02-12 | Stop reason: SDUPTHER

## 2019-07-12 RX ORDER — IPRATROPIUM BROMIDE AND ALBUTEROL SULFATE 2.5; .5 MG/3ML; MG/3ML
3 SOLUTION RESPIRATORY (INHALATION)
COMMUNITY
End: 2023-06-09

## 2019-07-12 RX ORDER — INSULIN LISPRO 100 [IU]/ML
6 INJECTION, SOLUTION INTRAVENOUS; SUBCUTANEOUS
COMMUNITY
End: 2019-08-22 | Stop reason: SDUPTHER

## 2019-07-12 RX ORDER — RIFAMPIN 600 MG/10ML
300 INJECTION, POWDER, LYOPHILIZED, FOR SOLUTION INTRAVENOUS
Status: ON HOLD | COMMUNITY
End: 2019-07-24 | Stop reason: CLARIF

## 2019-07-12 RX ORDER — PANTOPRAZOLE SODIUM 40 MG/1
40 FOR SUSPENSION ORAL
Status: ON HOLD | COMMUNITY
End: 2019-07-24 | Stop reason: CLARIF

## 2019-07-12 NOTE — PROGRESS NOTES
Subjective:       Patient ID: Mariann Huff is a 59 y.o. female.    Chief Complaint: Follow-up (Hospital; Back surgery)    HPI     59-year-old female here for follow-up of hospitalization.    Family and/or Caretaker present at visit?  Yes.  Diagnostic tests reviewed/disposition: I have reviewed all completed as well as pending diagnostic tests at the time of discharge.  Disease/illness education: sepsis, intra-abdominal abscess, DVTs  Home health/community services discussion/referrals: Patient has home health established at [facility].   Establishment or re-establishment of referral orders for community resources: No other necessary community resources.   Discussion with other health care providers: No discussion with other health care providers necessary.  I reviewed and reconciled the medications from hospital discharge.    Patient was admitted with neuro surgery and had an intraoperative left ureteral injury with ureter ureteral anastomosis an ureteral stent placed.  Second admission, patient had an intra-abdominal abscess that was washed out.  She had left ureter ligated with nephrostomy tube placement.  Patient had bilateral upper extremity and right lower extremity DVTs with an IVC filter placed.  She still has the urinary stent and nephrostomy.    Patient was admitted to the ICU for evaluation of anemia.  Transfuse 2 units packed red blood cells.  Patient had NSTEMI from blood loss.  Patient's ejection fraction dropped to 40-45%.  She was diuresed with Lasix.  Patient was placed on meropenem because of resistant Pseudomonas.  Patient was sent to LTAC.    Her trach and peg tube have been removed.  She is on oral antibiotics for her pneumonia still.  She is due to be on the antibiotics for at least a month.  She sees ID on July 26th.    She got home June 2nd.  She had been hospitalized since April.  Her appetite is poor, but is getting better since she has been home.  Her diabetes is good since she has been  home.    She has therapy coming twice a week.    She has an IVC filter and plavix for the blood clot.  The blood clots were provoked by the prolonged hospitalization.  Her right leg has been weak since she got up to start walking.  This is slowing her progress.  She has had the clot a month and a half.    Discharge summary:  Mariann Huff is a 60 y/o female with history of Asthma, HTN, HLD,  CAD (LAD GINGER proximal and distal 2013 and GINGER ostial LAD 2/2014), HFpEF, NSTEMI, T2DM, Obesity, Sleep Apnea, and back pain who presented to the ED on 6/6/19 with acute shortness of breath started last night.       Mrs. Huff has had two recent hospitalizations. 4/12/19-4/14/19 for L5-S1 OLIF with Neurosurgery with intraoperaative left ureteral injury with ureteroureteral anastomosis and ureteral stent placement. Second admission, 4/20/19-6/5/19 for intraabdominal abscess s/p washout and ligation of left ureter with nephrostomy tube placement on 4/21/19 and tracheostomy and PEG placement on 5/2/19.  Most recent hospitalization complicated by BUE and RLE DVT s/p IVC filter on 5/29/19 given concern for GIB with workup revealing rectal ulcer requiring pRBC transfusion.  She was followed by ID during admission and is being treated with long term antibiotic therapy for E coli and Staph lugdenesis bacteremia with IV cefazolin and rifampin with end dated 6/18/19 given hardware.  She also completed 7 day course of cefepime for HAP (pseudomonas) on 5/25/19 and candida glabrata UTI that she received 7 days of high dose fluconazole.   She was discharged to rehab.     In the ED, she was started empirically on vancomycin and cefepime for possible PNA, CTA chest ordered for possible PE.  Labs remarkable for BNP 1403, lactate wnl, wbc 13 K, and sCr 1.4 ( from 0.8).  She was given a duo neb and placed on PSV. She is being admitted to the ICU with critical care medicine for further workup and evaluation.      Hospital Course:   Ms. Huff was  admitted to the MICU on 06/06. The morning of 06/07/19 her Hb had dropped below 7 so she was transfused 2 units of PRBCs for acute blood loss from bleeding rectal ulcer. Cardiology was consulted for concern of NSTEMI 2/2 increasing Troponin; however, recommended not to treat as ACS because of bleeding and to manage with medical therapy. Repeat echo with reduced EF 40-45% from 53% and new diastolic dysfunction, wall motion abnormalities and worsening mitral regurgitation from previous echo on 05/09/19. She is diuresing well with lasix 60mg IV TID. Her sputum culture is positive for pseudomonas with resistance to pip tazo so abx changed to meropenem. ID following. Daily trach collar trials continue with a noted improvement on 06/10/19. LTACH consult placed     HPI:  Mariann Huff is a 59 y.o. female with history of Asthma, HTN, HLD, CAD (LAD GINGER proximal and distal 2013 and GINGER ostial LAD 2/2014), HFpEF, NSTEMI, T2DM, Obesity, Sleep Apnea, and back pain who presented to the ED on 6/6/19 with acute shortness of breath started the night before. Mrs. Huff has had two recent hospitalizations. 4/12/19-4/14/19 for L5-S1 OLIF with Neurosurgery with intraoperaative left ureteral injury with ureteroureteral anastomosis and ureteral stent placement. Second admission, 4/20/19-6/5/19 for intraabdominal abscess s/p washout and ligation of left ureter with nephrostomy tube placement on 4/21/19 and tracheostomy and PEG placement on 5/2/19.  Most recent hospitalization complicated by BUE and RLE DVT s/p IVC filter on 5/29/19 given concern for GIB with workup revealing rectal ulcer requiring pRBC transfusion. She was followed by ID during admission and is being treated with long term antibiotic therapy for E coli and Staph lugdenesis bacteremia with IV cefazolin and rifampin with end dated 6/18/19 given hardware.  She also completed 7 day course of cefepime for HAP (pseudomonas) on 5/25/19 and candida glabrata UTI that she  received 7 days of high dose fluconazole.  She was discharged to rehab. She as admitted to ICU where she was found to have acute blood loss from rectal ulcer. Patient additionally developed NSTEMI from the anemia and pseudomonal hospital associated pneumonia. Imaging showed persisting fluid collection along the midline abdominal incision. IR aspirated fluid collection and cultures are pending. ID recommended continue meropenem until 6/15 and rifampin 300 mg BID and will require suppressive therapy indefinitely with cefadroxil and rifampin (due to hardware presence) once meropenem completed. She has intermittent back pain that is achy. Patient admitted to LTAC for IV antibiotics, ventilator weaning.  Patient completed antimicrobial therapy for infective intra-abdominal abscess.  Tolerated ventilator weaning, trach Decanulated 6/19. Rafampin stopped on 6/19 second to nausea.  Patient's appetite improved dysphagia resolved nausea vomiting resolved rifampin resumed at 600 mg 3 times a week per Infectious Disease recommendations nausea did not return after resuming rifampin.  Patient advised to continue rifampin and cefadroxil indefinitely as suppressive therapy for possible hardware involvement.  Advised she will need a follow-up with Infectious Disease before stopping these medications.  She will also need follow-up with Urology for her left nephrostomy drain neurosurgery, in GI evaluate removing PEG tube.  Home health arranged with [facility].  DME ordered.  Patient advised inpatient rehab strongly recommended for weakness and debility status post recent fall last week.  Patient declined inpatient rehab adamant about returning home  in son to assist with patient care at home.  Discharge coordinated per , home health, and patient.  Patient provided with script for Norco for pain management until she can follow up with her primary care provider.      Hospital Course:     Acute on chronic  respiratory failure with hypoxia, resolved   Hospital acquired pseudomonal pneumonia, resolved  S/P trach decannulation 6/19                 Continue scopalamine patch q3 days              Daily weights              Strict Is/Os              Lasix                    NSTEMI type II  CAD s/p stent  Essential HTN   Acute on chronic systolic and diastolic dysfunction, chronic, stable              ASA 81 gm daily              Atorvastatin 80 mg daily              Losartan 25 mg daily              Metoprolol 25 mg BID              Clopidogrel 75 mg daily due to recent NSTEMI              Risks factor modification,  Low salt diet, low-fat diet, exercise        Intraabdominal abscess   E. Coli and Staph lugdenesis bacteremia 2/2 abdominal infection                 Possible hardware involvement id advises suppressive therapy                          Cefadroxil 1g po bid and rifampin 300mg bid                           6/26 Communicated to ID patient's nausea resolved, tolerating diet. ID will evaluate patient and              make recommendations   Continue cefadroxil and rifampin indefinitely for suppression          DM II with HTN and CKD II                 Accucheck and SSI              Diabetic diet              Monitor renal function              BP control              Diabetes control              Diet modification     Moderate protein malnutrition, improving  Dysphagia, resolved  S/p gastrostomy                 SLP recommended regular diet              Monitor intake              GI follow-up for PEG tube removal     Hypernatremia, resolved        Debility, improving daiily                 Pt/OT              Ambulates with walker        Anemia of chronic infection  Stable  Monitor          Vertebral Hardware Infection, stable  S/p L5-S1 OLIF on 4/12 complicated by L ureteral injury  S/p ureteroureteral anastomoses and ureteral stent placement  S/p L nephrostomy 4/21                 Brace whenever out of  bed              PT/OT, progressing well, ambulates in her room with walker              Follow up with neurosurgery              Follow up with urology              Good UOP out of drain and normal urination             ID rec continue suppression with Cefadroxil 1g po bid              Rifampin three times per week dosing     Hematuria, resolved  6/24 urology notified of dark blood in drainage bag  Remains afebrile no leukocytosis hemoglobin stable, urinating in toilet, if reoccurs order UA         Rectal ulcer                  Noted on sigmoidoscopy 5/13 and healing on colonoscopy 5/24              Avoid fecal collection devices     History of Reggie upper and lower DVT              S/p ivc filter 5/29              Heparin q8h---- consider transitioning to oral anticoag                            Heparin discontinued    Review of Systems    Objective:      Physical Exam   Constitutional: She is oriented to person, place, and time. She appears well-developed and well-nourished.   HENT:   Head: Normocephalic and atraumatic.   Mouth/Throat: No oropharyngeal exudate.   Eyes: Pupils are equal, round, and reactive to light. EOM are normal. Right eye exhibits no discharge. Left eye exhibits no discharge. No scleral icterus.   Neck: Normal range of motion. Neck supple. No tracheal deviation present. No thyromegaly present.   Cardiovascular: Normal rate, regular rhythm and normal heart sounds. Exam reveals no gallop and no friction rub.   No murmur heard.  Pulmonary/Chest: Effort normal and breath sounds normal. No respiratory distress. She has no wheezes. She has no rales. She exhibits no tenderness.   Abdominal: Soft. Bowel sounds are normal. She exhibits no distension and no mass. There is no tenderness. There is no rebound and no guarding.   Musculoskeletal: Normal range of motion. She exhibits no edema or tenderness.   Neurological: She is alert and oriented to person, place, and time.   Skin: Skin is warm and dry. No  rash noted. No erythema. No pallor.   Psychiatric: She has a normal mood and affect. Her behavior is normal.   Vitals reviewed.      Assessment:       1. Hospital discharge follow-up    2. Sepsis, due to unspecified organism    3. Postoperative infection, unspecified type, subsequent encounter    4. Staph infection    5. Acute deep vein thrombosis (DVT) of femoral vein of right lower extremity    6. (HFpEF) heart failure with preserved ejection fraction    7. NSTEMI (non-ST elevated myocardial infarction)    8. Coronary artery disease involving native coronary artery of native heart without angina pectoris    9. Chronic combined systolic and diastolic CHF (congestive heart failure)    10. Nephrostomy status    11. Transection of ureter of left native kidney, subsequent encounter    12. Status post insertion of percutaneous endoscopic gastrostomy (PEG) tube    13. Rectal ulcer    14. Gastrointestinal hemorrhage, unspecified gastrointestinal hemorrhage type        Plan:       1/2/3/4.  Continue antibiotics per Infectious Disease. Patient has follow-up coming up.  5.  Patient has IVC filter.  Follow up with Hematology in 3 months.  Patient had recent GI bleed, so not candidate for anticoagulation at this point.  6/7/8/9.  Continue Lasix 40 mg daily.  10/11.  Continue to follow with Urology.  12.  This has been removed.  13/14.  Refer to GI.  Check CBC.    Patient advised to use Glucerna to help with protein levels.  Continue with home health.    Return to clinic in 1 month or sooner if needed.

## 2019-07-15 ENCOUNTER — TELEPHONE (OUTPATIENT)
Dept: INTERNAL MEDICINE | Facility: CLINIC | Age: 59
End: 2019-07-15

## 2019-07-15 DIAGNOSIS — Z93.6 NEPHROSTOMY STATUS: Primary | ICD-10-CM

## 2019-07-15 LAB — FUNGUS SPEC CULT: NORMAL

## 2019-07-15 RX ORDER — ONDANSETRON 8 MG/1
8 TABLET, ORALLY DISINTEGRATING ORAL EVERY 12 HOURS PRN
Qty: 30 TABLET | Refills: 2 | Status: SHIPPED | OUTPATIENT
Start: 2019-07-15 | End: 2021-05-18

## 2019-07-15 NOTE — TELEPHONE ENCOUNTER
Spoke with patient, reviewed results. Patient c/o nausea and vomiting today, was nauseous yesterday without vomiting. Would like something called in.

## 2019-07-15 NOTE — TELEPHONE ENCOUNTER
----- Message from Roshni Anderson sent at 7/15/2019 12:47 PM CDT -----  Contact: patient [phone] cell phone  Patient called with c/o nausea and vomiting and wants to know if you will order something for this. She was discharged from hospital last Tuesday. Please call pt.       [id number] - [place] - [address] AT [facility] [phone]

## 2019-07-15 NOTE — TELEPHONE ENCOUNTER
Staff attempted to go over labs w/ pt. No answer, staff left a detailed message for pt explaining her results and physician's instructions.

## 2019-07-15 NOTE — TELEPHONE ENCOUNTER
----- Message from Jasbir Haney MD sent at 7/13/2019  9:20 AM CDT -----  Electrolytes, kidney/liver function are normal.  Protein is increasing.  Potassium is a little low, please eat two bananas or oranges a day.  Blood counts are improving.

## 2019-07-17 ENCOUNTER — TELEPHONE (OUTPATIENT)
Dept: INTERVENTIONAL RADIOLOGY/VASCULAR | Facility: HOSPITAL | Age: 59
End: 2019-07-17

## 2019-07-17 DIAGNOSIS — Z93.6 NEPHROSTOMY STATUS: Primary | ICD-10-CM

## 2019-07-17 DIAGNOSIS — N18.9 CHRONIC KIDNEY DISEASE, UNSPECIFIED CKD STAGE: ICD-10-CM

## 2019-07-18 PROBLEM — T83.022A NEPHROSTOMY TUBE DISPLACED: Status: ACTIVE | Noted: 2019-07-18

## 2019-07-19 ENCOUNTER — TELEPHONE (OUTPATIENT)
Dept: INTERNAL MEDICINE | Facility: CLINIC | Age: 59
End: 2019-07-19

## 2019-07-19 DIAGNOSIS — S37.10XS LEFT URETERAL INJURY, SEQUELA: Primary | ICD-10-CM

## 2019-07-19 NOTE — TELEPHONE ENCOUNTER
----- Message from Arlen Sparrow sent at 7/19/2019  8:24 AM CDT -----  Contact: Jc Physical Therapist with [facility] [phone]  Calling to let you know she had a missed visited on yesterday due to her doctors appointment lasting longer than she anticipated.

## 2019-07-22 ENCOUNTER — OFFICE VISIT (OUTPATIENT)
Dept: NEUROSURGERY | Facility: CLINIC | Age: 59
End: 2019-07-22
Payer: MEDICARE

## 2019-07-22 ENCOUNTER — HOSPITAL ENCOUNTER (OUTPATIENT)
Dept: RADIOLOGY | Facility: HOSPITAL | Age: 59
Discharge: HOME | End: 2019-07-22
Attending: NEUROLOGICAL SURGERY
Payer: MEDICARE

## 2019-07-22 VITALS
WEIGHT: 135 LBS | HEIGHT: 65 IN | BODY MASS INDEX: 22.49 KG/M2 | HEART RATE: 82 BPM | DIASTOLIC BLOOD PRESSURE: 78 MMHG | SYSTOLIC BLOOD PRESSURE: 151 MMHG

## 2019-07-22 DIAGNOSIS — Z98.1 S/P LUMBAR SPINAL FUSION: ICD-10-CM

## 2019-07-22 DIAGNOSIS — Z98.1 S/P LUMBAR FUSION: Primary | ICD-10-CM

## 2019-07-22 PROCEDURE — 72100 X-RAY EXAM L-S SPINE 2/3 VWS: CPT | Mod: 26,,, | Performed by: RADIOLOGY

## 2019-07-22 PROCEDURE — 3077F PR MOST RECENT SYSTOLIC BLOOD PRESSURE >= 140 MM HG: ICD-10-PCS | Mod: CPTII,S$GLB,, | Performed by: NEUROLOGICAL SURGERY

## 2019-07-22 PROCEDURE — 3008F BODY MASS INDEX DOCD: CPT | Mod: CPTII,S$GLB,, | Performed by: NEUROLOGICAL SURGERY

## 2019-07-22 PROCEDURE — 3078F DIAST BP <80 MM HG: CPT | Mod: CPTII,S$GLB,, | Performed by: NEUROLOGICAL SURGERY

## 2019-07-22 PROCEDURE — 99213 PR OFFICE/OUTPT VISIT, EST, LEVL III, 20-29 MIN: ICD-10-PCS | Mod: S$GLB,,, | Performed by: NEUROLOGICAL SURGERY

## 2019-07-22 PROCEDURE — 99999 PR PBB SHADOW E&M-EST. PATIENT-LVL V: CPT | Mod: PBBFAC,,, | Performed by: NEUROLOGICAL SURGERY

## 2019-07-22 PROCEDURE — 3008F PR BODY MASS INDEX (BMI) DOCUMENTED: ICD-10-PCS | Mod: CPTII,S$GLB,, | Performed by: NEUROLOGICAL SURGERY

## 2019-07-22 PROCEDURE — 3078F PR MOST RECENT DIASTOLIC BLOOD PRESSURE < 80 MM HG: ICD-10-PCS | Mod: CPTII,S$GLB,, | Performed by: NEUROLOGICAL SURGERY

## 2019-07-22 PROCEDURE — 99213 OFFICE O/P EST LOW 20 MIN: CPT | Mod: S$GLB,,, | Performed by: NEUROLOGICAL SURGERY

## 2019-07-22 PROCEDURE — 72100 X-RAY EXAM L-S SPINE 2/3 VWS: CPT | Mod: TC,PN

## 2019-07-22 PROCEDURE — 72100 XR LUMBAR SPINE AP AND LATERAL: ICD-10-PCS | Mod: 26,,, | Performed by: RADIOLOGY

## 2019-07-22 PROCEDURE — 99999 PR PBB SHADOW E&M-EST. PATIENT-LVL V: ICD-10-PCS | Mod: PBBFAC,,, | Performed by: NEUROLOGICAL SURGERY

## 2019-07-22 PROCEDURE — 3077F SYST BP >= 140 MM HG: CPT | Mod: CPTII,S$GLB,, | Performed by: NEUROLOGICAL SURGERY

## 2019-07-22 RX ORDER — GABAPENTIN 300 MG/1
300 CAPSULE ORAL 2 TIMES DAILY
Qty: 60 CAPSULE | Refills: 11 | Status: SHIPPED | OUTPATIENT
Start: 2019-07-22 | End: 2021-05-18

## 2019-07-22 NOTE — PROGRESS NOTES
NEUROSURGICAL PROGRESS NOTE    DATE OF SERVICE:  07/22/2019    ATTENDING PHYSICIAN:  Mk George MD    SUBJECTIVE:    INTERIM HISTORY:    This is a very pleasant 59 y.o. female, who is status more than 3 months L5-S1 OLIF, ureteral transection repaired intraop. Post-operative course complicated by urosepsis. Nephrostomy in place. Long ICU stay due to respiratory insufficiency. Is now home doing home health PT. Reports gait difficulty. Right leg pain. Low back pain. Dorsiflexion weakness on the right side.  Takes Norco 5/325 as needed.                PAST MEDICAL HISTORY:  Active Ambulatory Problems     Diagnosis Date Noted    Hypertension associated with diabetes     Hyperlipidemia     CAD (coronary artery disease)     Sleep apnea     Carotid stenosis, bilateral 10/24/2012    NSTEMI (non-ST elevated myocardial infarction) 02/17/2014    Coronary stent restenosis 02/26/2014    S/P coronary artery stent placement 02/26/2014    Nuclear sclerosis - Right Eye 03/18/2014    Primary localized osteoarthrosis, lower leg 06/18/2014    Chronic sinusitis 05/07/2015    PSC (posterior subcapsular cataract), right 08/17/2015    DME (diabetic macular edema) 08/17/2015    Refractive error 08/17/2015    Pseudophakia 08/17/2015    Senile nuclear sclerosis 09/01/2015    Type 2 diabetes mellitus with diabetic peripheral angiopathy without gangrene 02/24/2016    Diabetic peripheral neuropathy associated with type 2 diabetes mellitus 02/24/2016    Cervical arthritis 08/29/2016    Neurogenic claudication 08/29/2016    Lumbar herniated disc 10/24/2016    Fatty liver disease, nonalcoholic 11/01/2017    Arthritis of lumbar spine 07/23/2018    Chronic pain 09/25/2018    Lumbar radiculopathy 02/06/2019    Lumbosacral radiculopathy at L5 04/12/2019    Ureteral transection of left native kidney 04/13/2019    Type 2 diabetes mellitus with stage 2 chronic kidney disease and hypertension 04/20/2019    HLD  (hyperlipidemia) 04/20/2019    Asthma 04/20/2019    Essential hypertension 04/20/2019    Postoperative infection     Normocytic anemia     BRBPR (bright red blood per rectum) 05/07/2019    Bradycardia 05/08/2019    Delayed surgical wound healing 05/13/2019    Rectal ulcer     Palliative care encounter 05/27/2019    Pain     Protein malnutrition     Acute deep vein thrombosis (DVT) of femoral vein of right lower extremity     Impaired functional mobility and endurance 05/31/2019    (HFpEF) heart failure with preserved ejection fraction 06/06/2019    Elevated troponin 06/06/2019    Alteration in skin integrity 06/07/2019    Stented coronary artery     Debility 06/10/2019    Staph infection     Chronic combined systolic and diastolic CHF (congestive heart failure) 07/02/2019    Nephrostomy status 07/11/2019    Nephrostomy tube displaced 07/18/2019     Resolved Ambulatory Problems     Diagnosis Date Noted    Diabetes mellitus type II, uncontrolled 10/24/2012    Obesity 10/24/2012    Neck pain 03/07/2013    Non compliance with medical treatment 06/19/2013    PAPA (acute kidney injury) 02/17/2014    Type II or unspecified type diabetes mellitus with other specified manifestations, uncontrolled 06/06/2014    Peripheral neuropathy 06/06/2014    Enthesopathy of hip region 06/18/2014    Type II or unspecified type diabetes mellitus with peripheral circulatory disorders, uncontrolled(250.72) 09/25/2014    Diabetes mellitus with peripheral artery disease 02/02/2015    Dysphagia 08/12/2016    Neck pain on right side 10/14/2016    Chronic bilateral low back pain with sciatica 10/14/2016    Decreased range of motion 10/14/2016    Decreased strength 10/14/2016    Decreased functional mobility 10/14/2016    Closed nondisplaced fracture of fifth metatarsal bone of right foot with routine healing 07/14/2017    Ureteral stent retained 04/16/2019    Sepsis 04/20/2019    Encephalopathy 04/20/2019     Altered mental status     Encounter for weaning from ventilator     Metabolic acidosis     At risk for fluid volume overload 2019    On enteral nutrition 2019    Acute postoperative respiratory failure     Pulmonary edema     Dermatitis associated with moisture 2019    Urinary tract infection without hematuria     Status post insertion of percutaneous endoscopic gastrostomy (PEG) tube     Acute on chronic respiratory failure with hypoxia 2019    Fever 2019    Pneumonia of both lungs due to Pseudomonas species 2019    Acute combined systolic and diastolic heart failure 2019    Excessive carbohydrate intake 2019    Tracheostomy in place      Past Medical History:   Diagnosis Date    Anticoagulant long-term use     Asthma     Back pain     Bradycardia, unspecified 2019    CAD (coronary artery disease)     Carotid artery stenosis     Chronic combined systolic and diastolic CHF (congestive heart failure) 2019    Diabetes mellitus type 2 in obese     HTN (hypertension), benign     Hyperlipidemia     Keloid cicatrix     NPDR (nonproliferative diabetic retinopathy) 2015    NSTEMI (non-ST elevated myocardial infarction)     Nuclear sclerosis - Right Eye 3/18/2014    Primary localized osteoarthrosis, lower leg 2014    Sleep apnea        PAST SURGICAL HISTORY:  Past Surgical History:   Procedure Laterality Date    BRONCHOSCOPY N/A 2019    [facility] 2ND [facility]    CARDIAC CATHETERIZATION      cataract extraction left eye      cataracts      CATHETERIZATION, HEART, LEFT Left 2014    [facility]formed by [facility]man [facility] at [facility] CATH LAB     SECTION, LOW TRANSVERSE      COLONOSCOPY N/A 2019    Performed by Al Alaniz [facility] at [facility] ENDO (2ND FLR)    CORONARY ANGIOPLASTY      Creation, Nephrostomy, Percutaneous Left 2019    Performed by Karina Surgeon at [facility] KARINA     CREATION, TRACHEOSTOMY N/A 5/2/2019    Performed by [facility] at [facility] OR 2ND FLR    EGD, WITH PEG TUBE INSERTION  5/2/2019    Performed by Sean [facility]ano MD at [facility] OR 2ND FLR    ESOPHAGOGASTRODUODENOSCOPY (EGD) N/A 8/12/2016    Performed by Gardenia Adamson MD at [facility] ENDO (4TH FLR)    EXCISION TURBINATE, SUBMUCOUS      FUSION, SPINE, LUMBAR, ANTERIOR APPROACH Left L5-S1 Anterior to Psoa Interbody Fusion, L5-S1 Posterior Instrumentation Left 4/12/2019    Performed by Mk George MD at [facility] OR 2ND FLR    HAND SURGERY Left     HAND SURGERY Right     torn ligament repair/ Dr. Yeboah    HYSTERECTOMY      Injection,steroid,epidural,transforaminal approach - Bilateral - S1 Bilateral 9/25/2018    Performed by Tl Abreu MD at [facility] PAIN MGT    Injection-steroid-epidural-caudal N/A 2/7/2019    Performed by Dave Bentley Jr., MD at [facility] PAIN MGT    INSERTION-INTRAOCULAR LENS (IOL) Right 9/1/2015    Performed by Good Domingo MD at [facility] OR 1ST FLR    LAPAROTOMY, EXPLORATORY; LIGATION OF LEFT URETER; DOUBLE J STENT REMOVAL LEFT URETER Left 4/20/2019    Performed by Paul Carlson MD at [facility] OR 2ND FLR    left foot surgery      left wrist surgery      NASAL SEPTUM SURGERY  5/7/15    PHACOEMULSIFICATION-ASPIRATION-CATARACT Right 9/1/2015    Performed by Good Domingo MD at [facility] OR 1ST FLR    REPAIR, URETER  4/12/2019    Performed by Mk George MD at [facility] OR 2ND FLR    REPLACEMENT, NEPHROSTOMY TUBE N/A 7/18/2019    Performed by [facility] at [facility] OR 2ND [facility]    RESECTION-TURBINATES (SMR) N/A 5/7/2015    Performed by Dileep Dubois III, MD at [facility] OR 2ND FLR    rt elbow surgery      S/P LAD COATED STENT  05/14/2010    6 total     S/P OM1 STENT  08/2003    SEPTOPLASTY N/A 5/7/2015    Performed by Dileep Dubois III, MD at [facility] OR [address] [place] [address]    SIGMOIDOSCOPY, FLEXIBLE N/A 5/21/2019    Performed by ALBERTO Amin MD at [facility] ENDO  (2ND FLR)    SIGMOIDOSCOPY, FLEXIBLE N/A 5/13/2019    Performed by [facility]ERTO Amin MD at [facility] ENDO (2ND FLR)    SINUS SURGERY      F.E.S.S.    SINUS SURGERY FUNCTIONAL ENDOSCOPIC WITH NAVIGATION WITH MAXILLARIES, ETHMOIDS, LEFT SPHENOID, LEFT LOLY N/A 5/7/2015    Performed by Dileep Dubois III, MD at [facility] OR 2ND FLR    STENT, URETERAL placement  4/12/2019    Performed by Robin Boyd MD at [facility] OR 2ND FLR    TUBAL LIGATION         SOCIAL HISTORY:   Social History     Socioeconomic History    Marital status:      Spouse name: Shamir    Number of children: 2    Years of education: Not on file    Highest education level: Not on file   Occupational History    Occupation: [employer]     Employer: [employer]     Employer: [employer]     Employer: [employer]   Social Needs    Financial resource strain: Not on file    Food insecurity:     Worry: Not on file     Inability: Not on file    Transportation needs:     Medical: Not on file     Non-medical: Not on file   Tobacco Use    Smoking status: Never Smoker    Smokeless tobacco: Never Used   Substance and Sexual Activity    Alcohol use: No     Alcohol/week: 0.0 oz    Drug use: No    Sexual activity: Yes     Partners: Male     Birth control/protection: Post-menopausal     Comment:    Lifestyle    Physical activity:     Days per week: Not on file     Minutes per session: Not on file    Stress: Not on file   Relationships    Social connections:     Talks on phone: Not on file     Gets together: Not on file     Attends Jewish service: Not on file     Active member of club or organization: Not on file     Attends meetings of clubs or organizations: Not on file     Relationship status: Not on file   Other Topics Concern    Are you pregnant or think you may be? No    Breast-feeding No   Social History Narrative    . 2 children.        FAMILY HISTORY:  Family History   Problem Relation Age  of Onset    Diabetes Mother     Heart disease Mother     Diabetes Father     Leukemia Father         leukemia    Heart attack Father     Diabetes Sister     Diabetes Brother     Diabetes Sister     No Known Problems Sister     No Known Problems Brother     No Known Problems Brother     No Known Problems Maternal Grandmother     No Known Problems Maternal Grandfather     No Known Problems Paternal Grandmother     No Known Problems Paternal Grandfather     No Known Problems Son     No Known Problems Son     No Known Problems Maternal Aunt     No Known Problems Maternal Uncle     No Known Problems Paternal Aunt     No Known Problems Paternal Uncle     Colon cancer Neg Hx     Inflammatory bowel disease Neg Hx     Melanoma Neg Hx     Psoriasis Neg Hx     Lupus Neg Hx     Eczema Neg Hx     Acne Neg Hx     Amblyopia Neg Hx     Blindness Neg Hx     Cancer Neg Hx     Cataracts Neg Hx     Glaucoma Neg Hx     Hypertension Neg Hx     Macular degeneration Neg Hx     Retinal detachment Neg Hx     Strabismus Neg Hx     Stroke Neg Hx     Thyroid disease Neg Hx     Heart failure Neg Hx     Hyperlipidemia Neg Hx        CURRENTS MEDICATIONS:  Current Outpatient Medications on File Prior to Visit   Medication Sig Dispense Refill    ACCU-CHEK EDIN PLUS METER Misc       albuterol (PROVENTIL/VENTOLIN HFA) 90 mcg/actuation inhaler Inhale 2 puffs into the lungs every 6 (six) hours as needed for Wheezing. 1 each 11    albuterol-ipratropium (DUO-NEB) 2.5 mg-0.5 mg/3 mL nebulizer solution Inhale 3 mLs into the lungs.      amLODIPine (NORVASC) 10 MG tablet 10 mg by Tube route.      aspirin 81 mg Tab Take 81 mg by mouth. 1 Tablet Oral Every day      atorvastatin (LIPITOR) 80 MG tablet 1 tablet (80 mg total) by Per G Tube route once daily. 90 tablet 3    blood sugar diagnostic (ACCU-CHEK EDIN PLUS TEST STRP) Strp TEST BLOOD SUGARS 4 TIMES DAILY 150 strip 11    cefadroxil (DURICEF) 1 gram tablet  Take 1 tablet (1 g total) by mouth 2 (two) times daily. 60 tablet 3    clopidogrel (PLAVIX) 75 mg tablet Take 1 tablet (75 mg total) by mouth once daily. 30 tablet 3    escitalopram oxalate (LEXAPRO) 10 MG tablet Take 1 tablet (10 mg total) by mouth once daily. 30 tablet 11    famotidine (PEPCID) 20 MG tablet Take 1 tablet (20 mg total) by mouth 2 (two) times daily. 60 tablet 11    furosemide (LASIX) 40 MG tablet Take 1 tablet (40 mg total) by mouth once daily. 30 tablet 11    HYDROcodone-acetaminophen (NORCO) 5-325 mg per tablet Take 1 tablet by mouth every 6 (six) hours as needed for Pain. 28 tablet 0    insulin aspart U-100 (NOVOLOG) 100 unit/mL (3 mL) InPn pen Inject 1-10 Units into the skin 4 (four) times daily before meals and nightly. 12 mL 11    insulin lispro (HUMALOG U-100 INSULIN) 100 unit/mL injection Inject 6 Units into the skin.      losartan (COZAAR) 50 MG tablet Take 1 tablet (50 mg total) by mouth once daily. 90 tablet 3    metoprolol tartrate (LOPRESSOR) 25 MG tablet Take 1 tablet (25 mg total) by mouth 2 (two) times daily. 60 tablet 11    multivitamin (THERAGRAN) per tablet Take 1 tablet by mouth once daily.      ondansetron (ZOFRAN-ODT) 8 MG TbDL Take 1 tablet (8 mg total) by mouth every 12 (twelve) hours as needed. 30 tablet 2    pantoprazole (PROTONIX) 40 mg GrPS 40 mg by Tube route.      nitroGLYCERIN (NITROSTAT) 0.4 MG SL tablet Take one every 2-3 min till chestpain relief for 3 times and if still no relief, call MD or come to ED 30 tablet 11    rifAMpin (RIFADIN) 300 MG capsule Take 2 capsules (600 mg total) by mouth every Mon, Wed, Fri. 30 capsule 2    rifAMpin (RIFADIN) 600 mg injection Inject 300 mg into the vein.      [DISCONTINUED] scopolamine (TRANSDERM-SCOP) 1.3-1.5 mg (1 mg over 3 days) Place 1 patch onto the skin Every 3 (three) days. 10 patch 2     Current Facility-Administered Medications on File Prior to Visit   Medication Dose Route Frequency Provider Last Rate  Last Dose    ciprofloxacin HCl tablet 500 mg  500 mg Oral Once Robin Boyd MD        lidocaine (PF) 10 mg/ml (1%) injection 10 mg  1 mL Intradermal Once Dave Bentley Jr., MD        lidocaine HCl 2% urojet   Urethral Once Robin Boyd MD           ALLERGIES:  Review of patient's allergies indicates:   Allergen Reactions    Milk containing products      Causes GI distress       REVIEW OF SYSTEMS:  Review of Systems   Constitutional: Positive for weight loss. Negative for diaphoresis and fever.   Respiratory: Negative for shortness of breath.    Cardiovascular: Negative for chest pain.   Gastrointestinal: Negative for blood in stool.   Genitourinary: Negative for hematuria.   Endo/Heme/Allergies: Does not bruise/bleed easily.   All other systems reviewed and are negative.        OBJECTIVE:    PHYSICAL EXAMINATION:   Vitals:    07/22/19 1623   BP: (!) 151/78   Pulse: 82       Neurosurgery Physical Exam    Back Exam     Muscle Strength   Right Quadriceps:  5/5   Left Quadriceps:  5/5             SI joint:   Palpation at the right and left SI joints not painful  FORD test is negative bilaterally  Gaenslen test is negative bilaterally  Thigh thrust test is negative bilaterally    Neurologic Exam     Motor Exam     Strength   Right deltoid: 5/5  Left deltoid: 5/5  Right biceps: 5/5  Left biceps: 5/5  Right triceps: 5/5  Left triceps: 5/5  Right interossei: 5/5  Left interossei: 5/5  Right iliopsoas: 5/5  Left iliopsoas: 5/5  Right quadriceps: 5/5  Left quadriceps: 5/5  Right anterior tibial: 4/5  Left anterior tibial: 5/5  Right gastroc: 5/5  Left gastroc: 5/5    Back incisions healed    DIAGNOSTIC DATA:  I personally interpreted the following imaging:   Lumbar XR today shows consolidation of fusion    ASSESMENT:  This is a 59 y.o. female with     Problem List Items Addressed This Visit     None      Visit Diagnoses     S/P lumbar fusion    -  Primary    Relevant Orders    Ambulatory consult to Physical  Therapy            PLAN:  AFO brace for right dorsiflexion weakness  Outpatient Pt 3 times a week for 6 weeks  Urology FU  All questions answered  Gabapentin 300 twice a day for chronic pain.       Mk George MD  Cell:[phone]

## 2019-07-23 ENCOUNTER — TELEPHONE (OUTPATIENT)
Dept: INTERVENTIONAL RADIOLOGY/VASCULAR | Facility: HOSPITAL | Age: 59
End: 2019-07-23

## 2019-07-23 DIAGNOSIS — N18.9 CHRONIC KIDNEY DISEASE, UNSPECIFIED CKD STAGE: ICD-10-CM

## 2019-07-23 DIAGNOSIS — S37.10XS LEFT URETERAL INJURY, SEQUELA: Primary | ICD-10-CM

## 2019-07-23 RX ORDER — MIDAZOLAM HYDROCHLORIDE 1 MG/ML
1 INJECTION INTRAMUSCULAR; INTRAVENOUS
Status: CANCELLED | OUTPATIENT
Start: 2019-07-23

## 2019-07-23 RX ORDER — FENTANYL CITRATE 50 UG/ML
50 INJECTION, SOLUTION INTRAMUSCULAR; INTRAVENOUS
Status: CANCELLED | OUTPATIENT
Start: 2019-07-23

## 2019-07-24 ENCOUNTER — HOSPITAL ENCOUNTER (OUTPATIENT)
Facility: HOSPITAL | Age: 59
Discharge: HOME | End: 2019-07-24
Attending: UROLOGY | Admitting: UROLOGY
Payer: MEDICARE

## 2019-07-24 VITALS
WEIGHT: 135 LBS | RESPIRATION RATE: 16 BRPM | OXYGEN SATURATION: 99 % | HEIGHT: 64 IN | SYSTOLIC BLOOD PRESSURE: 156 MMHG | TEMPERATURE: 99 F | HEART RATE: 85 BPM | DIASTOLIC BLOOD PRESSURE: 76 MMHG | BODY MASS INDEX: 23.05 KG/M2

## 2019-07-24 DIAGNOSIS — Z93.6 NEPHROSTOMY STATUS: ICD-10-CM

## 2019-07-24 DIAGNOSIS — S37.10XS LEFT URETERAL INJURY, SEQUELA: ICD-10-CM

## 2019-07-24 PROBLEM — S37.10XA LEFT URETERAL INJURY: Status: ACTIVE | Noted: 2019-07-24

## 2019-07-24 LAB — POCT GLUCOSE: 143 MG/DL (ref 70–110)

## 2019-07-24 PROCEDURE — 25500020 PHARM REV CODE 255: Performed by: UROLOGY

## 2019-07-24 PROCEDURE — 82962 GLUCOSE BLOOD TEST: CPT

## 2019-07-24 PROCEDURE — 63600175 PHARM REV CODE 636 W HCPCS: Performed by: RADIOLOGY

## 2019-07-24 PROCEDURE — 63600175 PHARM REV CODE 636 W HCPCS: Performed by: FAMILY MEDICINE

## 2019-07-24 RX ORDER — MIDAZOLAM HYDROCHLORIDE 1 MG/ML
INJECTION INTRAMUSCULAR; INTRAVENOUS CODE/TRAUMA/SEDATION MEDICATION
Status: COMPLETED | OUTPATIENT
Start: 2019-07-24 | End: 2019-07-24

## 2019-07-24 RX ORDER — SODIUM CHLORIDE 9 MG/ML
500 INJECTION, SOLUTION INTRAVENOUS ONCE
Status: DISCONTINUED | OUTPATIENT
Start: 2019-07-24 | End: 2019-07-24 | Stop reason: HOSPADM

## 2019-07-24 RX ORDER — FENTANYL CITRATE 50 UG/ML
INJECTION, SOLUTION INTRAMUSCULAR; INTRAVENOUS CODE/TRAUMA/SEDATION MEDICATION
Status: COMPLETED | OUTPATIENT
Start: 2019-07-24 | End: 2019-07-24

## 2019-07-24 RX ADMIN — FENTANYL CITRATE 50 MCG: 50 INJECTION, SOLUTION INTRAMUSCULAR; INTRAVENOUS at 11:07

## 2019-07-24 RX ADMIN — MIDAZOLAM HYDROCHLORIDE 1 MG: 1 INJECTION, SOLUTION INTRAMUSCULAR; INTRAVENOUS at 11:07

## 2019-07-24 RX ADMIN — IOHEXOL 10 ML: 300 INJECTION, SOLUTION INTRAVENOUS at 11:07

## 2019-07-24 RX ADMIN — CEFTRIAXONE 2 G: 2 INJECTION, SOLUTION INTRAVENOUS at 10:07

## 2019-07-24 NOTE — DISCHARGE SUMMARY
Radiology Discharge Summary      Hospital Course: No complications    Admit Date: 7/24/2019  Discharge Date: 07/24/2019     Instructions Given to Patient: Yes  Diet: Resume prior diet  Activity: activity as tolerated and no driving for today    Description of Condition on Discharge: Stable  Vital Signs (Most Recent): Temp: 98.6 °F (37 °C) (07/24/19 1115)  Pulse: 87 (07/24/19 1200)  Resp: 16 (07/24/19 1200)  BP: (!) 163/75 (07/24/19 1145)  SpO2: 100 % (07/24/19 1200)    Discharge Disposition: Home    Discharge Diagnosis: Hydronephrosis s/p nephrostomy exchange. Follow up as scheduled.    David Lo M.D.  Diagnostic and Interventional Radiologist  Department of Radiology  Pager: [phone]

## 2019-07-24 NOTE — PROCEDURES
Radiology Post-Procedure Note    Pre Op Diagnosis: left hydronephrosis    Post Op Diagnosis: left hydronephrosis    Procedure:left percutaneous nephrostomy tube change    Procedure performed by: David Lo MD    Written Informed Consent Obtained: Yes    Specimen Removed: NO    Estimated Blood Loss: Minimal    Findings: Local anesthesia and moderate sedation were used.      The indwelling nephrostomy tube was removed over a wire and a new 8.0 drainage catheter was placed under fluoroscopic guidance.  The drain was secured in place and dressed.      There were no complications and the patient tolerated the procedure well.  Please see Imaging report for further details.      David Lo M.D.  Diagnostic and Interventional Radiologist  Department of Radiology  Pager: [phone]

## 2019-07-24 NOTE — H&P
Radiology History & Physical      SUBJECTIVE:     Chief Complaint: Left ureteral injury     History of Present Illness:  Mariann Huff is a 59 y.o. female who underwent left anterior L5-S1 interbody fusion (19) and sustained a left ureteral transection. She underwent a ureteroureterostomy with stent placement at that time, with left nephrostomy tube placement.  She presents to IR today for Left nephrostomy tube exchange.     Past Medical History:   Diagnosis Date    Anticoagulant long-term use     Asthma     Back pain     Bradycardia, unspecified 2019    The etiology of the bradycardic episode is unclear.  The have appear to be respiratory in origin (at least the 1st episode).  We will continue to monitor carefully.  We are awaiting evaluation by Cardiology.      CAD (coronary artery disease)     s/p stentimg  (2), (1)    Carotid artery stenosis     Chronic combined systolic and diastolic CHF (congestive heart failure) 2019    Diabetes mellitus type 2 in obese     HTN (hypertension), benign     Hyperlipidemia     Keloid cicatrix     NPDR (nonproliferative diabetic retinopathy) 2015    NSTEMI (non-ST elevated myocardial infarction)     Nuclear sclerosis - Right Eye 3/18/2014    Primary localized osteoarthrosis, lower leg 2014    Sleep apnea      Past Surgical History:   Procedure Laterality Date    BRONCHOSCOPY N/A 2019    Performed by Sean Ruano MD at [facility] OR 2ND FLR    CARDIAC CATHETERIZATION      cataract extraction left eye      cataracts      CATHETERIZATION, HEART, LEFT Left 2014    Performed by Wilman Kim MD at [facility] CATH LAB     SECTION, LOW TRANSVERSE      COLONOSCOPY N/A 2019    Performed by Al Alaniz MD at [facility] ENDO (2ND FLR)    CORONARY ANGIOPLASTY      Creation, Nephrostomy, Percutaneous Left 2019    Performed by Karina Surgeon at [facility] KARINA    CREATION, TRACHEOSTOMY N/A 2019    Performed by  Sean Ruano MD at [facility] OR 2ND FLR    EGD, WITH PEG TUBE INSERTION  5/2/2019    Performed by Sean Ruano MD at [facility] OR 2ND FLR    ESOPHAGOGASTRODUODENOSCOPY (EGD) N/A 8/12/2016    Performed by Gardenia Adamson MD at [facility] ENDO (4TH FLR)    EXCISION TURBINATE, SUBMUCOUS      FUSION, SPINE, LUMBAR, ANTERIOR APPROACH Left L5-S1 Anterior to Psoa Interbody Fusion, L5-S1 Posterior Instrumentation Left 4/12/2019    Performed by Mk George MD at [facility] OR 2ND FLR    HAND SURGERY Left     HAND SURGERY Right     torn ligament repair/ Dr. Yeboah    HYSTERECTOMY      Injection,steroid,epidural,transforaminal approach - Bilateral - S1 Bilateral 9/25/2018    Performed by Tl Abreu MD at [facility] PAIN MGT    Injection-steroid-epidural-caudal N/A 2/7/2019    Performed by Dave Bentley Jr., MD at [facility] PAIN MGT    INSERTION-INTRAOCULAR LENS (IOL) Right 9/1/2015    Performed by Good Domingo MD at [facility] OR 1ST FLR    LAPAROTOMY, EXPLORATORY; LIGATION OF LEFT URETER; DOUBLE J STENT REMOVAL LEFT URETER Left 4/20/2019    Performed by Paul Carlson MD at [facility] OR 2ND FLR    left foot surgery      left wrist surgery      NASAL SEPTUM SURGERY  5/7/15    PHACOEMULSIFICATION-ASPIRATION-CATARACT Right 9/1/2015    Performed by Good Domingo MD at [facility] OR 1ST FLR    REPAIR, URETER  4/12/2019    Performed by Mk George MD at [facility] OR 2ND FLR    REPLACEMENT, NEPHROSTOMY TUBE N/A 7/18/2019    Performed by [facility] Diagnostic Provider at [facility] OR 2ND FLR    RESECTION-TURBINATES (SMR) N/A 5/7/2015    Performed by Dileep Dubois III, MD at [facility] OR 2ND FLR    rt elbow surgery      S/P LAD COATED STENT  05/14/2010    6 total     S/P OM1 STENT  08/2003    SEPTOPLASTY N/A 5/7/2015    Performed by Dileep Dubois III, MD at [facility] OR 2ND FLR    SIGMOIDOSCOPY, FLEXIBLE N/A 5/21/2019    Performed by ALBERTO Amin MD at [facility] ENDO (2ND FLR)    SIGMOIDOSCOPY, FLEXIBLE N/A 5/13/2019     Performed by ALBERTO Amin MD at [facility] ENDO (2ND FLR)    SINUS SURGERY      F.E.S.S.    SINUS SURGERY FUNCTIONAL ENDOSCOPIC WITH NAVIGATION WITH MAXILLARIES, ETHMOIDS, LEFT SPHENOID, LEFT LOLY N/A 5/7/2015    Performed by Dileep Dubois III, MD at [facility] OR 2ND FLR    STENT, URETERAL placement  4/12/2019    Performed by Robin Boyd MD at [facility] OR 2ND FLR    TUBAL LIGATION         Home Meds:   Prior to Admission medications    Medication Sig Start Date End Date Taking? Authorizing Provider   amLODIPine (NORVASC) 10 MG tablet 10 mg by Tube route.   Yes Historical Provider, MD   aspirin 81 mg Tab Take 81 mg by mouth. 1 Tablet Oral Every day   Yes Historical Provider, MD   atorvastatin (LIPITOR) 80 MG tablet 1 tablet (80 mg total) by Per G Tube route once daily. 7/3/19 7/2/20 Yes Kacey Avila NP   cefadroxil (DURICEF) 1 gram tablet Take 1 tablet (1 g total) by mouth 2 (two) times daily. 7/2/19  Yes Kacey Avila NP   clopidogrel (PLAVIX) 75 mg tablet Take 1 tablet (75 mg total) by mouth once daily. 7/3/19 7/2/20 Yes Kacey Avila NP   escitalopram oxalate (LEXAPRO) 10 MG tablet Take 1 tablet (10 mg total) by mouth once daily. 7/3/19 7/2/20 Yes Kacey Avila NP   famotidine (PEPCID) 20 MG tablet Take 1 tablet (20 mg total) by mouth 2 (two) times daily. 7/2/19 7/1/20 Yes Kacey Avila NP   losartan (COZAAR) 50 MG tablet Take 1 tablet (50 mg total) by mouth once daily. 7/3/19 7/2/20 Yes Kacey Avila NP   metoprolol tartrate (LOPRESSOR) 25 MG tablet Take 1 tablet (25 mg total) by mouth 2 (two) times daily. 7/2/19 7/1/20 Yes Kacey Avila NP   ACCU-CHEK EDIN PLUS METER Misc  9/23/14   Historical Provider, MD   albuterol (PROVENTIL/VENTOLIN HFA) 90 mcg/actuation inhaler Inhale 2 puffs into the lungs every 6 (six) hours as needed for Wheezing. 11/30/18   Jabsir Haney MD   albuterol-ipratropium (DUO-NEB) 2.5 mg-0.5 mg/3 mL nebulizer solution Inhale 3 mLs into the lungs.    Historical  Provider, MD   blood sugar diagnostic (ACCU-CHEK EDIN PLUS TEST STRP) Strp TEST BLOOD SUGARS 4 TIMES DAILY 8/31/15   Rosmery Fisher NP   furosemide (LASIX) 40 MG tablet Take 1 tablet (40 mg total) by mouth once daily. 7/3/19 7/2/20  Kacey Avila NP   gabapentin (NEURONTIN) 300 MG capsule Take 1 capsule (300 mg total) by mouth 2 (two) times daily. 7/22/19 7/21/20  Mk George MD   HYDROcodone-acetaminophen (NORCO) 5-325 mg per tablet Take 1 tablet by mouth every 6 (six) hours as needed for Pain. 7/2/19   Kacey Avila NP   insulin aspart U-100 (NOVOLOG) 100 unit/mL (3 mL) InPn pen Inject 1-10 Units into the skin 4 (four) times daily before meals and nightly. 7/2/19 7/1/20  Kacey Avila NP   insulin lispro (HUMALOG U-100 INSULIN) 100 unit/mL injection Inject 6 Units into the skin.    Historical Provider, MD   multivitamin (THERAGRAN) per tablet Take 1 tablet by mouth once daily. 7/3/19   Kacey Avila NP   nitroGLYCERIN (NITROSTAT) 0.4 MG SL tablet Take one every 2-3 min till chestpain relief for 3 times and if still no relief, call MD or come to ED 1/6/17   Jasbir Haney MD   ondansetron (ZOFRAN-ODT) 8 MG TbDL Take 1 tablet (8 mg total) by mouth every 12 (twelve) hours as needed. 7/15/19   Jasbir Haney MD   rifAMpin (RIFADIN) 300 MG capsule Take 2 capsules (600 mg total) by mouth every Mon, Wed, Fri. 7/3/19   Kacey Avila NP   pantoprazole (PROTONIX) 40 mg GrPS 40 mg by Tube route.  7/24/19  Historical Provider, MD   rifAMpin (RIFADIN) 600 mg injection Inject 300 mg into the vein.  7/24/19  Historical Provider, MD     Anticoagulants/Antiplatelets: no anticoagulation    Allergies:   Review of patient's allergies indicates:   Allergen Reactions    Milk containing products      Causes GI distress     Sedation History:  no adverse reactions    Review of Systems:   Hematological: no known coagulopathies  Respiratory: no shortness of breath  Cardiovascular: no chest pain  Gastrointestinal: no  abdominal pain  Genito-Urinary: no dysuria  Musculoskeletal: negative  Neurological: no TIA or stroke symptoms         OBJECTIVE:     Vital Signs (Most Recent)  Temp: 98.6 °F (37 °C) (07/24/19 0913)  Pulse: 83 (07/24/19 0913)  Resp: 18 (07/24/19 0913)  BP: (!) 183/87 (07/24/19 0913)  SpO2: 100 % (07/24/19 0913)    Physical Exam:  ASA: 2  Mallampati: 3    General: no acute distress  Mental Status: alert and oriented to person, place and time  HEENT: normocephalic, atraumatic  Chest: unlabored breathing  Heart: regular heart rate  Abdomen: nondistended, left neph tube in place  Extremity: moves all extremities    Laboratory  Lab Results   Component Value Date    INR 1.0 07/24/2019       Lab Results   Component Value Date    WBC 7.23 07/12/2019    HGB 10.3 (L) 07/12/2019    HCT 34.0 (L) 07/12/2019    MCV 90 07/12/2019     07/12/2019      Lab Results   Component Value Date     (H) 07/12/2019     07/12/2019    K 3.3 (L) 07/12/2019     07/12/2019    CO2 28 07/12/2019    BUN 14 07/12/2019    CREATININE 0.7 07/12/2019    CALCIUM 9.9 07/12/2019    MG 1.4 (L) 07/01/2019    ALT 7 (L) 07/12/2019    AST 11 07/12/2019    ALBUMIN 2.9 (L) 07/12/2019    BILITOT 0.3 07/12/2019    BILIDIR 0.3 07/01/2019       ASSESSMENT/PLAN:     Sedation Plan: moderate  Patient will undergo left nephrostomy tube exchange     Shania Mars MD  Department of Radiology, PGY-2  Pager: [phone]

## 2019-07-24 NOTE — PROGRESS NOTES
Procedure complete. Patient tolerated well. L nephrostomy tube exchanged connected to leg drainage bag. Dressing clean dry and intact. Pt to recover in rocu accompanied per ir nurses. Report to be given at bedside.

## 2019-07-26 ENCOUNTER — OFFICE VISIT (OUTPATIENT)
Dept: INFECTIOUS DISEASES | Facility: CLINIC | Age: 59
End: 2019-07-26
Payer: MEDICARE

## 2019-07-26 VITALS
HEART RATE: 78 BPM | SYSTOLIC BLOOD PRESSURE: 132 MMHG | HEIGHT: 64 IN | BODY MASS INDEX: 23.45 KG/M2 | TEMPERATURE: 98 F | WEIGHT: 137.38 LBS | DIASTOLIC BLOOD PRESSURE: 69 MMHG

## 2019-07-26 DIAGNOSIS — B95.8 STAPH INFECTION: Primary | ICD-10-CM

## 2019-07-26 PROCEDURE — 99215 OFFICE O/P EST HI 40 MIN: CPT | Mod: S$GLB,,, | Performed by: INTERNAL MEDICINE

## 2019-07-26 PROCEDURE — 3075F SYST BP GE 130 - 139MM HG: CPT | Mod: CPTII,S$GLB,, | Performed by: INTERNAL MEDICINE

## 2019-07-26 PROCEDURE — 3008F BODY MASS INDEX DOCD: CPT | Mod: CPTII,S$GLB,, | Performed by: INTERNAL MEDICINE

## 2019-07-26 PROCEDURE — 99999 PR PBB SHADOW E&M-EST. PATIENT-LVL IV: ICD-10-PCS | Mod: PBBFAC,,, | Performed by: INTERNAL MEDICINE

## 2019-07-26 PROCEDURE — 99215 PR OFFICE/OUTPT VISIT, EST, LEVL V, 40-54 MIN: ICD-10-PCS | Mod: S$GLB,,, | Performed by: INTERNAL MEDICINE

## 2019-07-26 PROCEDURE — 99999 PR PBB SHADOW E&M-EST. PATIENT-LVL IV: CPT | Mod: PBBFAC,,, | Performed by: INTERNAL MEDICINE

## 2019-07-26 PROCEDURE — 3078F DIAST BP <80 MM HG: CPT | Mod: CPTII,S$GLB,, | Performed by: INTERNAL MEDICINE

## 2019-07-26 PROCEDURE — 3075F PR MOST RECENT SYSTOLIC BLOOD PRESS GE 130-139MM HG: ICD-10-PCS | Mod: CPTII,S$GLB,, | Performed by: INTERNAL MEDICINE

## 2019-07-26 PROCEDURE — 3008F PR BODY MASS INDEX (BMI) DOCUMENTED: ICD-10-PCS | Mod: CPTII,S$GLB,, | Performed by: INTERNAL MEDICINE

## 2019-07-26 PROCEDURE — 3078F PR MOST RECENT DIASTOLIC BLOOD PRESSURE < 80 MM HG: ICD-10-PCS | Mod: CPTII,S$GLB,, | Performed by: INTERNAL MEDICINE

## 2019-07-26 RX ORDER — CEFADROXIL 1000 MG/1
1 TABLET ORAL 2 TIMES DAILY
Qty: 60 TABLET | Refills: 3 | Status: SHIPPED | OUTPATIENT
Start: 2019-07-26 | End: 2019-09-19

## 2019-07-26 NOTE — PROGRESS NOTES
Infectious Diseases Clinic Note    Subjective:       Patient ID: Mariann Huff is a 59 y.o. female.    Chief Complaint: No chief complaint on file.    HPI     Doing very well today, on cefadroxil suppression, has stopped RIF x 1 month    Past Medical History:   Diagnosis Date    Anticoagulant long-term use     Asthma     Back pain     Bradycardia, unspecified 5/8/2019    The etiology of the bradycardic episode is unclear.  The have appear to be respiratory in origin (at least the 1st episode).  We will continue to monitor carefully.  We are awaiting evaluation by Cardiology.      CAD (coronary artery disease)     s/p stentimg 2003 (2),2009 (1)    Carotid artery stenosis     Chronic combined systolic and diastolic CHF (congestive heart failure) 7/2/2019    Diabetes mellitus type 2 in obese     HTN (hypertension), benign     Hyperlipidemia     Keloid cicatrix     NPDR (nonproliferative diabetic retinopathy) 8/17/2015    NSTEMI (non-ST elevated myocardial infarction)     Nuclear sclerosis - Right Eye 3/18/2014    Primary localized osteoarthrosis, lower leg 6/18/2014    Sleep apnea        Social History     Socioeconomic History    Marital status:      Spouse name: Shamir    Number of children: 2    Years of education: Not on file    Highest education level: Not on file   Occupational History    Occupation: cafeteria     Employer: [employer]     Employer: [employer]     Employer: [employer]   Social Needs    Financial resource strain: Not on file    Food insecurity:     Worry: Not on file     Inability: Not on file    Transportation needs:     Medical: Not on file     Non-medical: Not on file   Tobacco Use    Smoking status: Never Smoker    Smokeless tobacco: Never Used   Substance and Sexual Activity    Alcohol use: No     Alcohol/week: 0.0 oz    Drug use: No    Sexual activity: Yes     Partners: Male     Birth control/protection:  Post-menopausal     Comment:    Lifestyle    Physical activity:     Days per week: Not on file     Minutes per session: Not on file    Stress: Not on file   Relationships    Social connections:     Talks on phone: Not on file     Gets together: Not on file     Attends L[employer] day service: Not on file     Active member of club or organization: Not on file     Attends meetings of clubs or organizations: Not on file     Relationship status: Not on file   Other Topics Concern    Are you pregnant or think you may be? No    Breast-feeding No   Social History Narrative    . 2 children.          Current Outpatient Medications:     ACCU-CHEK EDIN PLUS METER Misc, , Disp: , Rfl:     amLODIPine (NORVASC) 10 MG tablet, 10 mg by Tube route., Disp: , Rfl:     aspirin 81 mg Tab, Take 81 mg by mouth. 1 Tablet Oral Every day, Disp: , Rfl:     atorvastatin (LIPITOR) 80 MG tablet, 1 tablet (80 mg total) by Per G Tube route once daily., Disp: 90 tablet, Rfl: 3    blood sugar diagnostic (ACCU-CHEK EDIN PLUS TEST STRP) Strp, TEST BLOOD SUGARS 4 TIMES DAILY, Disp: 150 strip, Rfl: 11    cefadroxil (DURICEF) 1 gram tablet, Take 1 tablet (1 g total) by mouth 2 (two) times daily., Disp: 60 tablet, Rfl: 3    clopidogrel (PLAVIX) 75 mg tablet, Take 1 tablet (75 mg total) by mouth once daily., Disp: 30 tablet, Rfl: 3    famotidine (PEPCID) 20 MG tablet, Take 1 tablet (20 mg total) by mouth 2 (two) times daily., Disp: 60 tablet, Rfl: 11    furosemide (LASIX) 40 MG tablet, Take 1 tablet (40 mg total) by mouth once daily., Disp: 30 tablet, Rfl: 11    gabapentin (NEURONTIN) 300 MG capsule, Take 1 capsule (300 mg total) by mouth 2 (two) times daily., Disp: 60 capsule, Rfl: 11    HYDROcodone-acetaminophen (NORCO) 5-325 mg per tablet, Take 1 tablet by mouth every 6 (six) hours as needed for Pain., Disp: 28 tablet, Rfl: 0    insulin aspart U-100 (NOVOLOG) 100 unit/mL (3 mL) InPn pen, Inject 1-10 Units into the skin 4  (four) times daily before meals and nightly., Disp: 12 mL, Rfl: 11    losartan (COZAAR) 50 MG tablet, Take 1 tablet (50 mg total) by mouth once daily., Disp: 90 tablet, Rfl: 3    metoprolol tartrate (LOPRESSOR) 25 MG tablet, Take 1 tablet (25 mg total) by mouth 2 (two) times daily., Disp: 60 tablet, Rfl: 11    multivitamin (THERAGRAN) per tablet, Take 1 tablet by mouth once daily., Disp: , Rfl:     rifAMpin (RIFADIN) 300 MG capsule, Take 2 capsules (600 mg total) by mouth every Mon, Wed, Fri., Disp: 30 capsule, Rfl: 2    albuterol (PROVENTIL/VENTOLIN HFA) 90 mcg/actuation inhaler, Inhale 2 puffs into the lungs every 6 (six) hours as needed for Wheezing., Disp: 1 each, Rfl: 11    albuterol-ipratropium (DUO-NEB) 2.5 mg-0.5 mg/3 mL nebulizer solution, Inhale 3 mLs into the lungs., Disp: , Rfl:     escitalopram oxalate (LEXAPRO) 10 MG tablet, Take 1 tablet (10 mg total) by mouth once daily., Disp: 30 tablet, Rfl: 11    insulin lispro (HUMALOG U-100 INSULIN) 100 unit/mL injection, Inject 6 Units into the skin., Disp: , Rfl:     nitroGLYCERIN (NITROSTAT) 0.4 MG SL tablet, Take one every 2-3 min till chestpain relief for 3 times and if still no relief, call MD or come to ED, Disp: 30 tablet, Rfl: 11    ondansetron (ZOFRAN-ODT) 8 MG TbDL, Take 1 tablet (8 mg total) by mouth every 12 (twelve) hours as needed., Disp: 30 tablet, Rfl: 2    Current Facility-Administered Medications:     ciprofloxacin HCl tablet 500 mg, 500 mg, Oral, Once, Robin Boyd MD    lidocaine HCl 2% urojet, , Urethral, Once, Robin Boyd MD    Facility-Administered Medications Ordered in Other Visits:     lidocaine (PF) 10 mg/ml (1%) injection 10 mg, 1 mL, Intradermal, Once, Dave Bentley Jr., MD    Review of Systems   Constitutional: Negative for activity change, chills and fever.   HENT: Negative for congestion, mouth sores, rhinorrhea, sinus pressure and sore throat.    Eyes: Negative for photophobia, pain and redness.    Respiratory: Negative for cough, chest tightness, shortness of breath and wheezing.    Cardiovascular: Negative for chest pain and leg swelling.   Gastrointestinal: Negative for abdominal distention, abdominal pain, diarrhea, nausea and vomiting.   Endocrine: Negative for polyuria.   Genitourinary: Negative for decreased urine volume, dysuria and flank pain.   Musculoskeletal: Negative for joint swelling and neck pain.   Skin: Negative for color change.   Allergic/Immunologic: Negative for food allergies.   Neurological: Negative for dizziness, weakness and headaches.   Hematological: Negative for adenopathy.   Psychiatric/Behavioral: Negative for agitation and confusion. The patient is not nervous/anxious.            Objective:      Vitals:    07/26/19 1514   BP: 132/69   Pulse: 78   Temp: 98.1 °F (36.7 °C)     Physical Exam   Constitutional: She is oriented to person, place, and time. She appears well-developed and well-nourished. No distress.   HENT:   Head: Normocephalic and atraumatic.   Mouth/Throat: Oropharynx is clear and moist.   Eyes: Conjunctivae and EOM are normal. No scleral icterus.   Neck: Normal range of motion. Neck supple.   Cardiovascular: Normal rate and regular rhythm.   No murmur heard.  Pulmonary/Chest: Effort normal and breath sounds normal. No respiratory distress. She has no wheezes.   Abdominal: Soft. Bowel sounds are normal. She exhibits no distension.   Musculoskeletal: Normal range of motion. She exhibits no edema or tenderness.   Lymphadenopathy:     She has no cervical adenopathy.   Neurological: She is alert and oriented to person, place, and time. Coordination normal.   Skin: Skin is warm and dry. No rash noted. No erythema.   L PCNT, midline incision abd well healed   Psychiatric: She has a normal mood and affect. Her behavior is normal.           Assessment/Plan:       No diagnosis found.    58 y/o F h/o HTN, DMII, CAD, NSTEMI, s/p L5-S1 OLIF on 4/12 complicated by  intraoperative left ureteral injury s/p ureteroureteral anastomoses and ureteral stent placement who initially presented on 4/20 with fevers, AMS and intraabdominal abscess, s/p washout and ligation of left ureter with nephrostomy tube placement on 4/21.  Prolonged complicated hospital course included tracheostomy and PEG tube placement (both now reversed), bilateral upper and lower extremity DVT, s/p IVC filter placement on 5/29, rectal bleeding, rectal ulcer.  She is currently on long term antibiotic therapy for E coli bacteremia and Staph lugdenesis infection (abdominal source) with plan for subsequent suppressive therapy given concern for hardware involvement  Was treated for HAP (Pseudomonas) and candida glabrata UTI , recently readmitted 6/6 with fevers and hypoxic respiratory failure with significant volume overload required intubation found to have persistent fluid collection along inferior aspect of midline abdominal incision with surrounding inflammatory changes, IR aspiration of collection almost completely drained with few WBCs and cultures NGTD, sent to LTAC to complete total 5 day course of meropenem to end 6/13 with plan for suppression with cefadroxil/rifampin given concern for hardware infection. She is doing very well with resolution of most of the above issues walking with walker here for f/u  - Continue cefadroxil suppression for 6-12 months at least  - f/u as needed

## 2019-08-05 ENCOUNTER — TELEPHONE (OUTPATIENT)
Dept: INTERNAL MEDICINE | Facility: CLINIC | Age: 59
End: 2019-08-05

## 2019-08-05 DIAGNOSIS — M51.26 LUMBAR HERNIATED DISC: Chronic | ICD-10-CM

## 2019-08-05 DIAGNOSIS — M54.16 LUMBAR RADICULOPATHY: ICD-10-CM

## 2019-08-05 DIAGNOSIS — M54.17 LUMBOSACRAL RADICULOPATHY AT L5: ICD-10-CM

## 2019-08-05 DIAGNOSIS — I25.10 CORONARY ARTERY DISEASE INVOLVING NATIVE CORONARY ARTERY OF NATIVE HEART WITHOUT ANGINA PECTORIS: Primary | Chronic | ICD-10-CM

## 2019-08-05 DIAGNOSIS — I50.30 (HFPEF) HEART FAILURE WITH PRESERVED EJECTION FRACTION: Chronic | ICD-10-CM

## 2019-08-05 NOTE — TELEPHONE ENCOUNTER
Spoke with Kaia, Physical therapist with [facility]. Notified patient has completed home PT/OT and needs order for outpatient physical therapy with [facility] in [place] at [facility]

## 2019-08-07 ENCOUNTER — TELEPHONE (OUTPATIENT)
Dept: UROLOGY | Facility: CLINIC | Age: 59
End: 2019-08-07

## 2019-08-07 NOTE — TELEPHONE ENCOUNTER
----- Message from Bridget Gates LPN sent at 8/7/2019 11:52 AM CDT -----  Contact: Mercedes granados/Gladis HDZ assured the home health nurse that this is to be expected  ----- Message -----  From: Greta Glass  Sent: 8/7/2019  10:53 AM  To: Oliver NICHOLSON Staff    Needs Advice    Reason for call: Mercedes calling to advise that pt is having pinkish color urine coming from the tube. Should they expect this?        Communication Preference: Mercedes [phone]    Additional Information:

## 2019-08-08 ENCOUNTER — OFFICE VISIT (OUTPATIENT)
Dept: INTERVENTIONAL RADIOLOGY/VASCULAR | Facility: CLINIC | Age: 59
End: 2019-08-08
Payer: MEDICARE

## 2019-08-08 VITALS
BODY MASS INDEX: 23.93 KG/M2 | SYSTOLIC BLOOD PRESSURE: 182 MMHG | DIASTOLIC BLOOD PRESSURE: 78 MMHG | HEART RATE: 66 BPM | HEIGHT: 64 IN | WEIGHT: 140.19 LBS

## 2019-08-08 DIAGNOSIS — Z95.828 PRESENCE OF IVC FILTER: Primary | ICD-10-CM

## 2019-08-08 DIAGNOSIS — I82.499 DEEP VEIN THROMBOSIS (DVT) OF OTHER VEIN OF LOWER EXTREMITY, UNSPECIFIED CHRONICITY, UNSPECIFIED LATERALITY: ICD-10-CM

## 2019-08-08 DIAGNOSIS — R60.9 EDEMA, UNSPECIFIED TYPE: ICD-10-CM

## 2019-08-08 PROCEDURE — 99213 OFFICE O/P EST LOW 20 MIN: CPT | Mod: S$GLB,,, | Performed by: FAMILY MEDICINE

## 2019-08-08 PROCEDURE — 3008F PR BODY MASS INDEX (BMI) DOCUMENTED: ICD-10-PCS | Mod: CPTII,S$GLB,, | Performed by: FAMILY MEDICINE

## 2019-08-08 PROCEDURE — 99213 PR OFFICE/OUTPT VISIT, EST, LEVL III, 20-29 MIN: ICD-10-PCS | Mod: S$GLB,,, | Performed by: FAMILY MEDICINE

## 2019-08-08 PROCEDURE — 3008F BODY MASS INDEX DOCD: CPT | Mod: CPTII,S$GLB,, | Performed by: FAMILY MEDICINE

## 2019-08-08 PROCEDURE — 3078F DIAST BP <80 MM HG: CPT | Mod: CPTII,S$GLB,, | Performed by: FAMILY MEDICINE

## 2019-08-08 PROCEDURE — 3077F SYST BP >= 140 MM HG: CPT | Mod: CPTII,S$GLB,, | Performed by: FAMILY MEDICINE

## 2019-08-08 PROCEDURE — 3077F PR MOST RECENT SYSTOLIC BLOOD PRESSURE >= 140 MM HG: ICD-10-PCS | Mod: CPTII,S$GLB,, | Performed by: FAMILY MEDICINE

## 2019-08-08 PROCEDURE — 99999 PR PBB SHADOW E&M-EST. PATIENT-LVL III: CPT | Mod: PBBFAC,,, | Performed by: FAMILY MEDICINE

## 2019-08-08 PROCEDURE — 3078F PR MOST RECENT DIASTOLIC BLOOD PRESSURE < 80 MM HG: ICD-10-PCS | Mod: CPTII,S$GLB,, | Performed by: FAMILY MEDICINE

## 2019-08-08 PROCEDURE — 99999 PR PBB SHADOW E&M-EST. PATIENT-LVL III: ICD-10-PCS | Mod: PBBFAC,,, | Performed by: FAMILY MEDICINE

## 2019-08-08 NOTE — LETTER
August 8, 2019      Jasbir Haney MD  [address]  [place] [address]           [facility]  [address]  [place] [address]  Phone: [phone]          Patient: Mariann Huff   MR Number: [id number]   YOB: 1960   Date of Visit: 8/8/2019       Dear Dr. Jasbir Haney:    Thank you for referring Mariann Huff to me for evaluation. Attached you will find relevant portions of my assessment and plan of care.    If you have questions, please do not hesitate to call me. I look forward to following Mariann Huff along with you.    Sincerely,    Yareli Mock, NP    Enclosure  CC:  No Recipients    If you would like to receive this communication electronically, please contact [email or web address] or [phone] to request more information on [facility] access.    For providers and/or their staff who would like to refer a patient to [facility], please contact us through our one-stop-shop provider referral line, Esmer Campbell, at [phone].    If you feel you have received this communication in error or would no longer like to receive these types of communications, please e-mail [email or web address]

## 2019-08-08 NOTE — PROGRESS NOTES
Subjective:       Patient ID: Mariann Huff is a 59 y.o. female.    Chief Complaint: Consult and Presence of IVC filter    Patient here to discuss IVC filter retrieval. On 4/12/2019 she underwent left anterior L5-S1 interbody fusion, and unfortunately sustained a left ureteral transection. She underwent a ureteroureterostomy with stent placement at that time. She presented to the ER 4/20/19 with AMS, fever and respiratory distress. On imaging she was found to have a large amount of air and fluid within the retroperitoneal and within the left collecting system. This was not amenable to drainage by IR therefore she was taken to the OR for washout. It was during this hospital stay that she developed a DVT in right common femoral and proximal femoral veins. An IVC filter was placed on 5/29/2019. She admits to occasional right ankle swelling, but this is alleviated with elevation. She endorses gradual improvement in her mobility since discharge. She is able to perform her ADLs with minimal assistance. Her family member tells me she cooked on Sunday and only needs assistance getting in and out of the bath due to the step.     Review of Systems   Constitutional: Negative for activity change, appetite change, chills, fatigue and fever.   Respiratory: Negative for cough, shortness of breath, wheezing and stridor.    Cardiovascular: Positive for leg swelling (occasional right ankel swelling). Negative for chest pain and palpitations.       Objective:      Physical Exam   Constitutional: She is oriented to person, place, and time. She appears well-developed and well-nourished. No distress.   Cardiovascular: Normal rate, regular rhythm and normal heart sounds. Exam reveals no gallop and no friction rub.   No murmur heard.  Pulmonary/Chest: Effort normal and breath sounds normal. No stridor. No respiratory distress. She has no wheezes. She has no rales.   Neurological: She is alert and oriented to person, place, and time.    Skin: Skin is warm and dry. She is not diaphoretic.   Psychiatric: She has a normal mood and affect.   Vitals reviewed.       Assessment:       1. Presence of IVC filter    2. Deep vein thrombosis (DVT) of other vein of lower extremity, unspecified chronicity, unspecified laterality    3. Edema, unspecified type         Plan:         Reviewed CT scan with patient and pointed out IVC filter. Explained recommendation is to retrieve IVC filter. Discussed how the procedure will be performed, risks (including, but not limited to, pain, bleeding, infection, damage to nearby structures, and the need for additional procedures), benefits, possible complications, pre-post procedure expectations, and alternatives. Also explained we will need an ultrasound of the lower extremities prior to IVC filter retrieval. Patient states she will call when she is ready to schedule this.     A review of her chart shows that she will need a nephrostomy tube change around 10/24/2019. Discussed case with Dr. Hatfield. Offered to schedule both IVC filter retrieval and nephrostomy tube change on the same day. Patient agrees. The patient voices understanding and all questions have been answered. Dr. Hatfield in room. Written informed consent obtained. We will call to schedule once the physician's schedule for that time has been posted.

## 2019-08-12 ENCOUNTER — TELEPHONE (OUTPATIENT)
Dept: UROLOGY | Facility: CLINIC | Age: 59
End: 2019-08-12

## 2019-08-12 DIAGNOSIS — R31.0 HEMATURIA, GROSS: Primary | ICD-10-CM

## 2019-08-12 NOTE — TELEPHONE ENCOUNTER
Spoke to ricci with [facility] [phone]. Pt will be discharged on Wednesday .she will obtain a urine for culture on Wednesday via neph tube bag. Pt is currently on duricef daily

## 2019-08-12 NOTE — TELEPHONE ENCOUNTER
----- Message from Amber Mayer LPN sent at 8/12/2019 10:37 AM CDT -----  Contact: pt#[phone]  Pt states home health spoke to you last week about urine in neph bag . She states she still has blood in bag, it will clear up for a short amount of time and then return again  ----- Message -----  From: Katya Grimm  Sent: 8/12/2019   9:48 AM  To: Oliver NICHOLSON Staff    Needs Advice    Reason for call:Pt is calling about blood in bag        Communication Preference:call    Additional Information:

## 2019-08-12 NOTE — TELEPHONE ENCOUNTER
----- Message from Robin Boyd MD sent at 8/12/2019  1:53 PM CDT -----  Contact: pt#[phone]  As long as it is draining well and she has no sign of UTI or kidney related infection, I don't think we need to worry.  If this is happening daily, let's do urine culture off the neph tube ( not from a bag) and see what happens.    Dr. Boyd  ----- Message -----  From: Amber Mayer LPN  Sent: 8/12/2019  10:37 AM  To: Robin Boyd MD    Pt states home health spoke to you last week about urine in neph bag . She states she still has blood in bag, it will clear up for a short amount of time and then return again  ----- Message -----  From: Katya Grimm  Sent: 8/12/2019   9:48 AM  To: Oliver NICHOLSON Staff    Needs Advice    Reason for call:Pt is calling about blood in bag        Communication Preference:call    Additional Information:

## 2019-08-13 ENCOUNTER — TELEPHONE (OUTPATIENT)
Dept: HOME HEALTH SERVICES | Facility: HOSPITAL | Age: 59
End: 2019-08-13

## 2019-08-13 LAB
ACID FAST MOD KINY STN SPEC: NORMAL
MYCOBACTERIUM SPEC QL CULT: NORMAL

## 2019-08-19 ENCOUNTER — CLINICAL SUPPORT (OUTPATIENT)
Dept: REHABILITATION | Facility: HOSPITAL | Age: 59
End: 2019-08-19
Attending: NEUROLOGICAL SURGERY
Payer: MEDICARE

## 2019-08-19 DIAGNOSIS — R26.89 POOR BALANCE: ICD-10-CM

## 2019-08-19 DIAGNOSIS — R26.89 GAIT, ANTALGIC: ICD-10-CM

## 2019-08-19 DIAGNOSIS — R29.898 WEAKNESS OF BOTH LOWER EXTREMITIES: ICD-10-CM

## 2019-08-19 PROCEDURE — 97110 THERAPEUTIC EXERCISES: CPT | Mod: PO

## 2019-08-19 PROCEDURE — 97162 PT EVAL MOD COMPLEX 30 MIN: CPT | Mod: PO

## 2019-08-19 NOTE — PATIENT INSTRUCTIONS
Lumbar Rotation (Non-Weight Bearing)        Feet on floor, slowly rock knees from side to side in small, pain-free range of motion. Allow lower back to rotate slightly.  Repeat 15 times per set. Do 2 sets per session. Do 2 sessions per day.     [email or web address]://FlatStack.Vidmaker/160     Copyright © [employer]. All rights reserved.     Lumbar Rotation Stretch          Strengthening: Hip Adduction - Isometric        With ball or folded pillow between knees, squeeze knees together. Hold 5 seconds.  Repeat 10 times per set. Do 3 sets per session. Do 2 sessions per day.     https://Apparity/612     Copyright © [employer]. All rights reserved.        Strengthening: Hip Abductor - Resisted        With band looped around both legs above knees, push thighs apart.  Repeat 10 times per set. Do 3 sets per session. Do 2 sessions per day.     https://Apparity/688     Copyright © [employer]. All rights reserved.          Strengthening: Hip Abduction (Side-Lying)        Tighten muscles on front of left thigh, then lift leg 5-10 inches from surface, keeping knee locked.   Repeat 10 times per set. Do 3 sets per session. Do 2 sessions per day.     https://Apparity/622     Copyright © [employer]. All rights reserved.     Functional Quadriceps: Chair Squat        Keeping feet flat on floor, shoulder width apart, squat as low as is comfortable. Use support as necessary.  Repeat 10 times per set. Do 3 sets per session. Do 2 sessions per day.     https://Apparity/736     Copyright © [employer]. All rights reserved.       Strengthening: Terminal Knee Extension (Supine)        With right knee over bolster, straighten knee by tightening muscles on top of thigh. Keep bottom of knee on bolster.  Repeat 10 times per set. Do 3 sets per session. Do 2 sessions per day.     https://Apparity/626     Copyright © [employer]. All rights reserved.     Strengthening: Straight Leg Raise (Phase 2)        Resting on forearms, tighten muscles on front of left thigh, then lift leg 5-10 inches  from surface, keeping knee locked.  Repeat 10 times per set. Do 3 sets per session. Do 2 sessions per day.     https://Scholastica.exer.us/616     Copyright © [employer]. All rights reserved.   Heel Raise (Sitting)          Perform standing   Raise heels, keeping toes on floor.  Repeat 10 times per set. Do 3 sets per session. Do 2 sessions per day.     [email or web address]://[email or web address].Iperia.[employer]/44     Copyright © [employer]. All rights reserved.

## 2019-08-19 NOTE — PLAN OF CARE
[facility]  Physical Therapy Initial Evaluation    Name: Mariann Huff  [facility] Number: [id number]    Therapy Diagnosis:   Encounter Diagnoses   Name Primary?    Weakness of both lower extremities     Poor balance     Gait, antalgic      Physician: Mk George MD    Physician Orders: PT Eval and Treat   Medical Diagnosis from Referral: Z98.1 (ICD-10-CM) - S/P lumbar fusion  Evaluation Date: 8/19/2019  Authorization Period Expiration: 07/21/2020  Plan of Care Expiration: 10/19/19  Visit # / Visits authorized: 1/ 50    Time In: 9:00 am   Time Out: 10:00 am  Total Billable Time: 60 minutes    Precautions: Diabetes    Subjective   Date of onset: Chronic - lumbar fusion April 12, 2019   History of current condition - Elizaamanda reports: she has a long standing history with LBP, in which MD corrected originally with at lumbar fusion from L5-S1. Patent states following the fusion she ran into complications which caused her condition to worsen. Patient states she was in the hospital for 3-4 months, but eventually was discharged home. Since discharge, patient states she has been participating in home health therapy for 4/5 weeks and was discharged last Thursday. Patient states she has been having pain in the area of the fusion, in which she feels like is getting worse. Patient states she is taking prescribed pain medication as needed, however pain is still there. Patient denies using ice or heat for pain management. Patient denies any numbness or tingling traveling down (B) LE. Patient states she is unable to perform her ADLs  Such as cooking, cleaning, and cooking. Patient states she cannot sit or stand for long periods of time. Patient also has difficulty transitioning from sit to stand      Medical History:   Past Medical History:   Diagnosis Date    Anticoagulant long-term use     Asthma     Back pain     Bradycardia, unspecified 5/8/2019    The etiology of the bradycardic episode  is unclear.  The have appear to be respiratory in origin (at least the 1st episode).  We will continue to monitor carefully.  We are awaiting evaluation by Cardiology.      CAD (coronary artery disease)     s/p stentimg  (2), (1)    Carotid artery stenosis     Chronic combined systolic and diastolic CHF (congestive heart failure) 2019    Diabetes mellitus type 2 in obese     HTN (hypertension), benign     Hyperlipidemia     Keloid cicatrix     NPDR (nonproliferative diabetic retinopathy) 2015    NSTEMI (non-ST elevated myocardial infarction)     Nuclear sclerosis - Right Eye 3/18/2014    Primary localized osteoarthrosis, lower leg 2014    Sleep apnea        Surgical History:   Mariann Huff  has a past surgical history that includes left foot surgery; left wrist surgery; rt elbow surgery;  section, low transverse; Tubal ligation; cataract extraction left eye; cataracts; S/P OM1 STENT (2003); S/P LAD COATED STENT (2010); Cardiac catheterization; Coronary angioplasty; Hysterectomy; Hand surgery (Left); Excision turbinate, submucous; Sinus surgery; Nasal septum surgery (5/7/15); Hand surgery (Right); Caudal epidural steroid injection (N/A, 2019); Fusion of lumbar spine by anterior approach (Left, 2019); Repair of ureter (2019); Percutaneous nephrostomy (Left, 2019); Tracheostomy (N/A, 2019); Bronchoscopy (N/A, 2019); Esophagogastroduodenoscopy w/ PEG (2019); Flexible sigmoidoscopy (N/A, 2019); Flexible sigmoidoscopy (N/A, 2019); Colonoscopy (N/A, 2019); Replacement of nephrostomy tube (N/A, 2019); and Replacement of nephrostomy tube (N/A, 2019).    Medications:   Mariann has a current medication list which includes the following prescription(s): accu-chek thao plus meter, albuterol, albuterol-ipratropium, amlodipine, aspirin, atorvastatin, blood sugar diagnostic, cefadroxil, clopidogrel, escitalopram oxalate,  famotidine, furosemide, gabapentin, hydrocodone-acetaminophen, insulin aspart u-100, insulin lispro, losartan, metoprolol tartrate, multivitamin, nitroglycerin, ondansetron, and rifampin, and the following Facility-Administered Medications: ciprofloxacin hcl, lidocaine (pf) 10 mg/ml (1%), and lidocaine hcl 2%.    Allergies:   Review of patient's allergies indicates:   Allergen Reactions    Milk containing products      Causes GI distress        Imaging, none: no recent imaging of lumbar fusion    Prior Therapy: home health PT   Social History:  lives with their family  Occupation: retired   Prior Level of Function: independent   Current Level of Function: modified independent     Pain:  Current 5/10, worst 7/10, best 3/10   Location: bilateral back   Description: Dull  Aggravating Factors: Sitting, Standing, Bending, Walking and Getting out of bed/chair  Easing Factors: relaxation, pain medication and rest    Pts goals: decrease pain, increase strength, ambulation ability and enhance functional mobility in order to regain PLOF.     Objective     Observation: Patient ambulated into the clinic with RW with normal gait pattern     Posture:  Slightly rounded shoulders, loss of lumbar lordosis     Lumbar Range of Motion:    Degrees Pain   Flexion 20   pain        Extension 5   Pain         Left Side Bending 5  Pain         Right Side Bending 5 Pain           Lower Extremity Strength  Right LE  Left LE    Knee extension: 4+/5 Knee extension: 4+/5   Knee flexion: 4/5* Knee flexion: 4/5*   Hip flexion: 4/5 Hip flexion: 4/5   Hip extension:  4-/5* Hip extension: 4-/5*   Hip abduction: 4/5* Hip abduction: 4/5*   Hip adduction: 4/5 Hip adduction 4-/5   Ankle dorsiflexion: 3+/5 Ankle dorsiflexion: 4/5         Special Tests:  -Repeated Flexion: pain   -Repeated Ext: pain   -Bridge Test: able to perform with onset of pain       Joint Mobility: NT     Palpation: no tenderness but feels numb    Sensation: intact     SL Balance:  even surface R: 3 seconds; L = 3 seconds       CMS Impairment/Limitation/Restriction for FOTO Lumbar Survey    Therapist reviewed FOTO scores for Mariann Huff on 8/19/2019.   FOTO documents entered into [employer] - see Media section.    Limitation Score: 66%  Category: Mobility    Current : CL = least 60% but < 80% impaired, limited or restricted  Goal: CK = at least 40% but < 60% impaired, limited or restricted         TREATMENT   Treatment Time In: 9:00 am   Treatment Time Out: 10:00 am   Total Treatment time separate from Evaluation: 10/50minutes    Mariann received therapeutic exercises to develop strength, endurance, ROM, flexibility, posture and core stabilization for 10 minutes including: HEP Review and Development       Date  08/19/19   VISIT 1/50   G CODE VISIT 1/10   POC EXP. DATE 10/19/19   VISIT AMOUNT  MEDICARE TOTAL 113.20   FACE-TO-FACE 09/19/19       TABLE:    HSS w/ strap --   Piriformis stretch --   Hip flexor stretch --   TA's --   Bridge  --   LTR --   Marching  --   SAQ --   SLR --   Hip abduction --   Hip adduction --       SEATED:    LAQs --   Seated Hip Flex --   Sit to stand w/o UE --       STANDING:    Heel raises --   Mini squats --   Tandem Walking  --   Tandem Stance  --   SL balance  --   Step Ups --           Initials SYED     Mariann participated in gait training for 5 minutes with SPC with PT.     Home Exercises and Patient Education Provided    Education provided:   - perform HEP in pain-free range   - ambulating in the house with SPC; RW for long distances outside of the home     Written Home Exercises Provided: yes.  Exercises were reviewed and Mariann was able to demonstrate them prior to the end of the session.  Mariann demonstrated good  understanding of the education provided.     See EMR under Patient Instructions for exercises provided 8/19/2019.    Assessment   Mariann is a 59 y.o. female referred to outpatient Physical Therapy with a medical diagnosis of S/P lumbar  fusion. Pt presents with decreased strength, balance, ambulation ability, endurance, and overall functional mobility. Patient is currently ambulating with RW, however admits to intermittently using SPC. Patient finds more stability using RW especially when out in the community. Patient demonstrates moderate difficulty with bed mobility, however some difficulty can be attributed to having tubes places in her abdominal and lumbar areas. Patient's gait is slow, and demonstrates minimal sway. Patient also has difficulty with sit to stand transitions, as she is unable to perform without UE support. Patient is currently at least 60% but < 80% impaired, limited or restricted.     Pt prognosis is Good.   Pt will benefit from skilled outpatient Physical Therapy to address the deficits stated above and in the chart below, provide pt/family education, and to maximize pt's level of independence.     Plan of care discussed with patient: Yes  Pt's spiritual, cultural and educational needs considered and patient is agreeable to the plan of care and goals as stated below:     Anticipated Barriers for therapy: none    Medical Necessity is demonstrated by the following  History  Co-morbidities and personal factors that may impact the plan of care Co-morbidities:   diabetes, HTN and prior surgery back     Personal Factors:   no deficits     high   Examination  Body Structures and Functions, activity limitations and participation restrictions that may impact the plan of care Body Regions:   back    Body Systems:    strength  balance  gait  transfers    Participation Restrictions:   none    Activity limitations:   Learning and applying knowledge  no deficits    General Tasks and Commands  no deficits    Communication  no deficits    Mobility  lifting and carrying objects  walking  moving around using equipment (WC)  driving (bike, car, motorcycle)    Self care  washing oneself (bathing, drying, washing hands)  caring for body parts  (brushing teeth, shaving, grooming)  dressing    Domestic Life  shopping  cooking  doing house work (cleaning house, washing dishes, laundry)    Interactions/Relationships  no deficits    Life Areas  no deficits    Community and Social Life  community life  recreation and leisure         moderate   Clinical Presentation stable and uncomplicated moderate   Decision Making/ Complexity Score: moderate     Goals:  Short Term Goals: 4 weeks   Patient will be able to ambulate for 30 minutes with SPC  on even and uneven with min-no pain/difficulty in order to grocery shop.   Patient will be able to perform sit to stand transfer without UE support with min-no pain/difficulty.   Patient will be able to perform bed mobility with min-no pain/difficulty.   Increase SL balance to 10 seconds (B) with minimal LOB in order to reduce risk of falls and increase global stability.       Long Term Goals: 8 weeks   Patient will be able to ambulate for 1 hour with SPC on even and uneven with min-no pain/difficulty in order to grocery shop.   Patient will be able to sit for 2 hours in order to watch movie with family.   Patient will be able to transfer in and out of the tub SBA in order to bath independently.       Plan   Plan of care Certification: 8/19/2019 to 10/19/19.    Outpatient Physical Therapy 2 times weekly for 8 weeks to include the following interventions: Gait Training, Manual Therapy, Moist Heat/ Ice, Neuromuscular Re-ed, Patient Education, Therapeutic Activites and Therapeutic Exercise.     Mallory Dunne, PT, DPT

## 2019-08-21 ENCOUNTER — TELEPHONE (OUTPATIENT)
Dept: UROLOGY | Facility: CLINIC | Age: 59
End: 2019-08-21

## 2019-08-21 DIAGNOSIS — R31.9 URINARY TRACT INFECTION WITH HEMATURIA, SITE UNSPECIFIED: Primary | ICD-10-CM

## 2019-08-21 DIAGNOSIS — N39.0 URINARY TRACT INFECTION WITH HEMATURIA, SITE UNSPECIFIED: Primary | ICD-10-CM

## 2019-08-21 RX ORDER — CIPROFLOXACIN 500 MG/1
500 TABLET ORAL 2 TIMES DAILY
Qty: 14 TABLET | Refills: 0 | Status: SHIPPED | OUTPATIENT
Start: 2019-08-21 | End: 2019-08-28

## 2019-08-21 NOTE — TELEPHONE ENCOUNTER
Urine culture from on 8/14/19 from left nephrostomy grew Enterobacter, sensitive to cipro and trimethoprim/sulfa.    Urinary tract infection with hematuria, site unspecified  -     ciprofloxacin HCl (CIPRO) 500 MG tablet; Take 1 tablet (500 mg total) by mouth 2 (two) times daily. for 14 doses  Dispense: 14 tablet; Refill: 0    left a message on her cell phone.  Unable to reach her at home.    eRxed to the pharmacy.  Please call and advise the patient to take the medication as given.

## 2019-08-22 ENCOUNTER — OFFICE VISIT (OUTPATIENT)
Dept: CARDIOLOGY | Facility: CLINIC | Age: 59
End: 2019-08-22
Payer: MEDICARE

## 2019-08-22 ENCOUNTER — LAB VISIT (OUTPATIENT)
Dept: LAB | Facility: HOSPITAL | Age: 59
End: 2019-08-22
Attending: INTERNAL MEDICINE
Payer: MEDICARE

## 2019-08-22 ENCOUNTER — CLINICAL SUPPORT (OUTPATIENT)
Dept: REHABILITATION | Facility: HOSPITAL | Age: 59
End: 2019-08-22
Attending: NEUROLOGICAL SURGERY
Payer: MEDICARE

## 2019-08-22 ENCOUNTER — OFFICE VISIT (OUTPATIENT)
Dept: INTERNAL MEDICINE | Facility: CLINIC | Age: 59
End: 2019-08-22
Payer: MEDICARE

## 2019-08-22 VITALS
HEIGHT: 64 IN | RESPIRATION RATE: 18 BRPM | HEART RATE: 74 BPM | DIASTOLIC BLOOD PRESSURE: 80 MMHG | SYSTOLIC BLOOD PRESSURE: 130 MMHG | TEMPERATURE: 98 F | WEIGHT: 141.13 LBS | HEART RATE: 74 BPM | DIASTOLIC BLOOD PRESSURE: 80 MMHG | BODY MASS INDEX: 23.88 KG/M2 | WEIGHT: 139.88 LBS | HEIGHT: 64 IN | SYSTOLIC BLOOD PRESSURE: 175 MMHG | BODY MASS INDEX: 24.1 KG/M2

## 2019-08-22 DIAGNOSIS — E11.51 TYPE 2 DIABETES MELLITUS WITH DIABETIC PERIPHERAL ANGIOPATHY WITHOUT GANGRENE, UNSPECIFIED WHETHER LONG TERM INSULIN USE: ICD-10-CM

## 2019-08-22 DIAGNOSIS — I15.2 HYPERTENSION ASSOCIATED WITH DIABETES: Chronic | ICD-10-CM

## 2019-08-22 DIAGNOSIS — R26.89 POOR BALANCE: ICD-10-CM

## 2019-08-22 DIAGNOSIS — I50.30 (HFPEF) HEART FAILURE WITH PRESERVED EJECTION FRACTION: Chronic | ICD-10-CM

## 2019-08-22 DIAGNOSIS — I25.83 CORONARY ARTERY DISEASE DUE TO LIPID RICH PLAQUE: ICD-10-CM

## 2019-08-22 DIAGNOSIS — R26.89 GAIT, ANTALGIC: ICD-10-CM

## 2019-08-22 DIAGNOSIS — I25.10 CORONARY ARTERY DISEASE INVOLVING NATIVE CORONARY ARTERY OF NATIVE HEART WITHOUT ANGINA PECTORIS: Primary | Chronic | ICD-10-CM

## 2019-08-22 DIAGNOSIS — Z79.4 TYPE 2 DIABETES MELLITUS WITH DIABETIC PERIPHERAL ANGIOPATHY WITHOUT GANGRENE, WITH LONG-TERM CURRENT USE OF INSULIN: ICD-10-CM

## 2019-08-22 DIAGNOSIS — R00.1 BRADYCARDIA: ICD-10-CM

## 2019-08-22 DIAGNOSIS — E11.59 HYPERTENSION ASSOCIATED WITH DIABETES: Chronic | ICD-10-CM

## 2019-08-22 DIAGNOSIS — I25.10 CORONARY ARTERY DISEASE DUE TO LIPID RICH PLAQUE: ICD-10-CM

## 2019-08-22 DIAGNOSIS — I65.23 CAROTID STENOSIS, BILATERAL: Chronic | ICD-10-CM

## 2019-08-22 DIAGNOSIS — I65.23 CAROTID STENOSIS, BILATERAL: ICD-10-CM

## 2019-08-22 DIAGNOSIS — E11.42 DIABETIC PERIPHERAL NEUROPATHY ASSOCIATED WITH TYPE 2 DIABETES MELLITUS: ICD-10-CM

## 2019-08-22 DIAGNOSIS — Z74.09 IMPAIRED FUNCTIONAL MOBILITY AND ENDURANCE: ICD-10-CM

## 2019-08-22 DIAGNOSIS — S37.10XD: Chronic | ICD-10-CM

## 2019-08-22 DIAGNOSIS — G47.30 SLEEP APNEA, UNSPECIFIED TYPE: Chronic | ICD-10-CM

## 2019-08-22 DIAGNOSIS — I21.4 NSTEMI (NON-ST ELEVATED MYOCARDIAL INFARCTION): Chronic | ICD-10-CM

## 2019-08-22 DIAGNOSIS — K76.0 FATTY LIVER DISEASE, NONALCOHOLIC: ICD-10-CM

## 2019-08-22 DIAGNOSIS — E11.59 HYPERTENSION ASSOCIATED WITH DIABETES: ICD-10-CM

## 2019-08-22 DIAGNOSIS — Z93.6 NEPHROSTOMY STATUS: ICD-10-CM

## 2019-08-22 DIAGNOSIS — T82.855S CORONARY STENT RESTENOSIS, SEQUELA: ICD-10-CM

## 2019-08-22 DIAGNOSIS — E11.51 TYPE 2 DIABETES MELLITUS WITH DIABETIC PERIPHERAL ANGIOPATHY WITHOUT GANGRENE, WITH LONG-TERM CURRENT USE OF INSULIN: ICD-10-CM

## 2019-08-22 DIAGNOSIS — I15.2 HYPERTENSION ASSOCIATED WITH DIABETES: ICD-10-CM

## 2019-08-22 DIAGNOSIS — E78.2 MIXED HYPERLIPIDEMIA: ICD-10-CM

## 2019-08-22 DIAGNOSIS — Z95.5 S/P CORONARY ARTERY STENT PLACEMENT: ICD-10-CM

## 2019-08-22 DIAGNOSIS — E11.51 TYPE 2 DIABETES MELLITUS WITH DIABETIC PERIPHERAL ANGIOPATHY WITHOUT GANGRENE, UNSPECIFIED WHETHER LONG TERM INSULIN USE: Primary | Chronic | ICD-10-CM

## 2019-08-22 DIAGNOSIS — E78.2 MIXED HYPERLIPIDEMIA: Chronic | ICD-10-CM

## 2019-08-22 PROBLEM — R79.89 ELEVATED TROPONIN: Status: RESOLVED | Noted: 2019-06-06 | Resolved: 2019-08-22

## 2019-08-22 PROBLEM — K62.5 BRBPR (BRIGHT RED BLOOD PER RECTUM): Status: RESOLVED | Noted: 2019-05-07 | Resolved: 2019-08-22

## 2019-08-22 PROBLEM — R29.898 WEAKNESS OF BOTH LOWER EXTREMITIES: Status: RESOLVED | Noted: 2019-08-19 | Resolved: 2019-08-22

## 2019-08-22 LAB
CHOLEST SERPL-MCNC: 169 MG/DL (ref 120–199)
CHOLEST/HDLC SERPL: 4.6 {RATIO} (ref 2–5)
ESTIMATED AVG GLUCOSE: 166 MG/DL (ref 68–131)
HBA1C MFR BLD HPLC: 7.4 % (ref 4–5.6)
HDLC SERPL-MCNC: 37 MG/DL (ref 40–75)
HDLC SERPL: 21.9 % (ref 20–50)
LDLC SERPL CALC-MCNC: 107.6 MG/DL (ref 63–159)
NONHDLC SERPL-MCNC: 132 MG/DL
TRIGL SERPL-MCNC: 122 MG/DL (ref 30–150)
TSH SERPL DL<=0.005 MIU/L-ACNC: 2.85 UIU/ML (ref 0.4–4)

## 2019-08-22 PROCEDURE — 84443 ASSAY THYROID STIM HORMONE: CPT

## 2019-08-22 PROCEDURE — 99215 OFFICE O/P EST HI 40 MIN: CPT | Mod: S$GLB,,, | Performed by: INTERNAL MEDICINE

## 2019-08-22 PROCEDURE — 99999 PR PBB SHADOW E&M-EST. PATIENT-LVL III: CPT | Mod: PBBFAC,,, | Performed by: INTERNAL MEDICINE

## 2019-08-22 PROCEDURE — 3008F PR BODY MASS INDEX (BMI) DOCUMENTED: ICD-10-PCS | Mod: CPTII,S$GLB,, | Performed by: INTERNAL MEDICINE

## 2019-08-22 PROCEDURE — 3079F DIAST BP 80-89 MM HG: CPT | Mod: CPTII,S$GLB,, | Performed by: INTERNAL MEDICINE

## 2019-08-22 PROCEDURE — 99999 PR PBB SHADOW E&M-EST. PATIENT-LVL III: ICD-10-PCS | Mod: PBBFAC,,, | Performed by: INTERNAL MEDICINE

## 2019-08-22 PROCEDURE — 3074F PR MOST RECENT SYSTOLIC BLOOD PRESSURE < 130 MM HG: ICD-10-PCS | Mod: CPTII,S$GLB,, | Performed by: INTERNAL MEDICINE

## 2019-08-22 PROCEDURE — 3079F PR MOST RECENT DIASTOLIC BLOOD PRESSURE 80-89 MM HG: ICD-10-PCS | Mod: CPTII,S$GLB,, | Performed by: INTERNAL MEDICINE

## 2019-08-22 PROCEDURE — 3008F BODY MASS INDEX DOCD: CPT | Mod: CPTII,S$GLB,, | Performed by: INTERNAL MEDICINE

## 2019-08-22 PROCEDURE — 83036 HEMOGLOBIN GLYCOSYLATED A1C: CPT

## 2019-08-22 PROCEDURE — 3044F HG A1C LEVEL LT 7.0%: CPT | Mod: CPTII,S$GLB,, | Performed by: INTERNAL MEDICINE

## 2019-08-22 PROCEDURE — 3044F PR MOST RECENT HEMOGLOBIN A1C LEVEL <7.0%: ICD-10-PCS | Mod: CPTII,S$GLB,, | Performed by: INTERNAL MEDICINE

## 2019-08-22 PROCEDURE — 3075F SYST BP GE 130 - 139MM HG: CPT | Mod: CPTII,S$GLB,, | Performed by: INTERNAL MEDICINE

## 2019-08-22 PROCEDURE — 99214 PR OFFICE/OUTPT VISIT, EST, LEVL IV, 30-39 MIN: ICD-10-PCS | Mod: S$GLB,,, | Performed by: INTERNAL MEDICINE

## 2019-08-22 PROCEDURE — 3074F SYST BP LT 130 MM HG: CPT | Mod: CPTII,S$GLB,, | Performed by: INTERNAL MEDICINE

## 2019-08-22 PROCEDURE — 80061 LIPID PANEL: CPT

## 2019-08-22 PROCEDURE — 97110 THERAPEUTIC EXERCISES: CPT | Mod: PO

## 2019-08-22 PROCEDURE — 3075F PR MOST RECENT SYSTOLIC BLOOD PRESS GE 130-139MM HG: ICD-10-PCS | Mod: CPTII,S$GLB,, | Performed by: INTERNAL MEDICINE

## 2019-08-22 PROCEDURE — 99215 PR OFFICE/OUTPT VISIT, EST, LEVL V, 40-54 MIN: ICD-10-PCS | Mod: S$GLB,,, | Performed by: INTERNAL MEDICINE

## 2019-08-22 PROCEDURE — 36415 COLL VENOUS BLD VENIPUNCTURE: CPT | Mod: PO

## 2019-08-22 PROCEDURE — 99214 OFFICE O/P EST MOD 30 MIN: CPT | Mod: S$GLB,,, | Performed by: INTERNAL MEDICINE

## 2019-08-22 NOTE — PROGRESS NOTES
Subjective:   Patient ID:  Mariann Huff is a 59 y.o. female who presents for follow-up of Hypertension      HPI: Recently prolonged hospitalization with Staph sepsis following orthopedic surgery with complications requiring intubation, tracheostomy, nephrostomy, PEG tube placement ( see extensive notes by hospital medicine, surgery urology, ID and IM). Still goes to physical and occupational therapy. Nutritional status not at goal.  BP today elevated, but based on visiting nurse records it is fine and patient did not take her BP meds yet.  She takes them all at lunch time. Overall patient is doing relatively well.    TRANSTHORACIC ECHO (TTE) COMPLETE   Conclusion     · Mild left ventricular enlargement.  · Mildly decreased left ventricular systolic function. The estimated ejection fraction is 40-45%, quantitiative LVEF 38-40%  · Grade II (moderate) left ventricular diastolic dysfunction consistent with pseudonormalization.  · Elevated left atrial pressure.  · Mild-moderate left atrial enlargement.  · Local segmental wall motion abnormalities.  · Mechanically ventilated; cannot use inferior caval vein diameter to estimate central venous pressure.  · Normal right ventricular systolic function.  · Moderate mitral regurgitation.     The LVEF is slightly lower than on the prior studies.    Lab Results   Component Value Date     07/12/2019    K 3.3 (L) 07/12/2019     07/12/2019    CO2 28 07/12/2019    BUN 14 07/12/2019    CREATININE 0.7 07/12/2019     (H) 07/12/2019    HGBA1C 5.4 05/24/2019    MG 1.4 (L) 07/01/2019    AST 11 07/12/2019    ALT 7 (L) 07/12/2019    ALBUMIN 2.9 (L) 07/12/2019    PROT 7.9 07/12/2019    BILITOT 0.3 07/12/2019    WBC 7.23 07/12/2019    HGB 10.3 (L) 07/12/2019    HCT 34.0 (L) 07/12/2019    HCT 19 (LL) 05/19/2019    MCV 90 07/12/2019     07/12/2019    INR 1.0 07/24/2019    TSH 1.170 10/22/2018    CHOL 202 (H) 01/21/2019    HDL 48 01/21/2019    LDLCALC 134.0 01/21/2019  "   TRIG 100 01/21/2019       Review of Systems   Constitution: Positive for weight loss.   HENT: Negative.    Eyes: Negative.    Cardiovascular: Negative.  Negative for chest pain, claudication, dyspnea on exertion, irregular heartbeat, leg swelling, near-syncope, palpitations and syncope.   Respiratory: Negative.  Negative for cough, shortness of breath, snoring and wheezing.    Endocrine: Negative.  Negative for cold intolerance, heat intolerance, polydipsia, polyphagia and polyuria.   Skin: Negative.    Musculoskeletal: Positive for back pain.   Gastrointestinal: Negative.    Genitourinary: Negative.    Neurological: Negative.    Psychiatric/Behavioral: Negative.        Objective:   Physical Exam   Constitutional: She is oriented to person, place, and time. She appears well-developed and well-nourished.   BP (!) 175/80   Pulse 74   Ht 5' 4" (1.626 m)   Wt 63.5 kg (139 lb 14.1 oz)   LMP  (LMP Unknown)   BMI 24.01 kg/m²   Frail, chronically ill.   HENT:   Head: Normocephalic.   Eyes: Pupils are equal, round, and reactive to light.   Neck: Normal range of motion. Neck supple. Carotid bruit is not present. No thyromegaly present.   Cardiovascular: Normal rate, regular rhythm, normal heart sounds and intact distal pulses. Exam reveals no gallop and no friction rub.   No murmur heard.  Pulses:       Carotid pulses are 2+ on the right side, and 2+ on the left side.       Radial pulses are 2+ on the right side, and 2+ on the left side.        Femoral pulses are 2+ on the right side, and 2+ on the left side.       Popliteal pulses are 2+ on the right side, and 2+ on the left side.        Dorsalis pedis pulses are 2+ on the right side, and 2+ on the left side.        Posterior tibial pulses are 2+ on the right side, and 2+ on the left side.   Pulmonary/Chest: Effort normal and breath sounds normal. No respiratory distress. She has no wheezes. She has no rales. She exhibits no tenderness.   Abdominal: Soft. Bowel " sounds are normal.   Musculoskeletal: Normal range of motion. She exhibits no edema.   Neurological: She is alert and oriented to person, place, and time.   Skin: Skin is warm and dry.   Psychiatric: She has a normal mood and affect.   Nursing note and vitals reviewed.        Assessment and Plan:     Problem List Items Addressed This Visit        Cardiology Problems    Hypertension associated with diabetes (Chronic)    Relevant Orders    Transthoracic echo (TTE) 2D with Color Flow    Hyperlipidemia (Chronic)    CAD (coronary artery disease) - Primary (Chronic)    Relevant Orders    Transthoracic echo (TTE) 2D with Color Flow    Carotid stenosis, bilateral (Chronic)    Relevant Orders    Transthoracic echo (TTE) 2D with Color Flow    NSTEMI (non-ST elevated myocardial infarction) (Chronic)    Relevant Orders    Transthoracic echo (TTE) 2D with Color Flow    (HFpEF) heart failure with preserved ejection fraction (Chronic)    Relevant Orders    Transthoracic echo (TTE) 2D with Color Flow       Other    Sleep apnea (Chronic)    S/P coronary artery stent placement    Diabetic peripheral neuropathy associated with type 2 diabetes mellitus    Fatty liver disease, nonalcoholic    Bradycardia    Impaired functional mobility and endurance          Patient's Medications   New Prescriptions    No medications on file   Previous Medications    ACCU-CHEK EDIN PLUS METER MISC        ALBUTEROL (PROVENTIL/VENTOLIN HFA) 90 MCG/ACTUATION INHALER    Inhale 2 puffs into the lungs every 6 (six) hours as needed for Wheezing.    ALBUTEROL-IPRATROPIUM (DUO-NEB) 2.5 MG-0.5 MG/3 ML NEBULIZER SOLUTION    Inhale 3 mLs into the lungs.    AMLODIPINE (NORVASC) 10 MG TABLET    10 mg by Tube route.    ASPIRIN 81 MG TAB    Take 81 mg by mouth. 1 Tablet Oral Every day    ATORVASTATIN (LIPITOR) 80 MG TABLET    1 tablet (80 mg total) by Per G Tube route once daily.    BLOOD SUGAR DIAGNOSTIC (ACCU-CHEK EDIN PLUS TEST STRP) STRP    TEST BLOOD SUGARS 4  TIMES DAILY    CEFADROXIL (DURICEF) 1 GRAM TABLET    Take 1 tablet (1 g total) by mouth 2 (two) times daily.    CIPROFLOXACIN HCL (CIPRO) 500 MG TABLET    Take 1 tablet (500 mg total) by mouth 2 (two) times daily. for 14 doses    CLOPIDOGREL (PLAVIX) 75 MG TABLET    Take 1 tablet (75 mg total) by mouth once daily.    ESCITALOPRAM OXALATE (LEXAPRO) 10 MG TABLET    Take 1 tablet (10 mg total) by mouth once daily.    FAMOTIDINE (PEPCID) 20 MG TABLET    Take 1 tablet (20 mg total) by mouth 2 (two) times daily.    FUROSEMIDE (LASIX) 40 MG TABLET    Take 1 tablet (40 mg total) by mouth once daily.    GABAPENTIN (NEURONTIN) 300 MG CAPSULE    Take 1 capsule (300 mg total) by mouth 2 (two) times daily.    HYDROCODONE-ACETAMINOPHEN (NORCO) 5-325 MG PER TABLET    Take 1 tablet by mouth every 6 (six) hours as needed for Pain.    INSULIN ASPART U-100 (NOVOLOG) 100 UNIT/ML (3 ML) INPN PEN    Inject 1-10 Units into the skin 4 (four) times daily before meals and nightly.    LOSARTAN (COZAAR) 50 MG TABLET    Take 1 tablet (50 mg total) by mouth once daily.    METOPROLOL TARTRATE (LOPRESSOR) 25 MG TABLET    Take 1 tablet (25 mg total) by mouth 2 (two) times daily.    MULTIVITAMIN (THERAGRAN) PER TABLET    Take 1 tablet by mouth once daily.    NITROGLYCERIN (NITROSTAT) 0.4 MG SL TABLET    Take one every 2-3 min till chestpain relief for 3 times and if still no relief, call MD or come to ED    ONDANSETRON (ZOFRAN-ODT) 8 MG TBDL    Take 1 tablet (8 mg total) by mouth every 12 (twelve) hours as needed.   Modified Medications    No medications on file   Discontinued Medications    INSULIN LISPRO (HUMALOG U-100 INSULIN) 100 UNIT/ML INJECTION    Inject 6 Units into the skin.    RIFAMPIN (RIFADIN) 300 MG CAPSULE    Take 2 capsules (600 mg total) by mouth every Mon, Wed, Fri.   Repeat CFD and advise.  Patient will monitor BP and call if out of range.  For now continue on the present therapy.    Follow up in about 6 months (around  2/22/2020).

## 2019-08-22 NOTE — PROGRESS NOTES
"  Physical Therapy Daily Treatment Note     Name: Mariann Huff  Clinic Number: [id number]    Therapy Diagnosis:   Encounter Diagnoses   Name Primary?    Poor balance     Gait, antalgic      Physician: Mk George MD    Visit Date: 8/22/2019    Physician Orders: PT Eval and Treat   Medical Diagnosis from Referral: Z98.1 (ICD-10-CM) - S/P lumbar fusion  Evaluation Date: 8/19/2019  Authorization Period Expiration: 07/21/2020  Plan of Care Expiration: 10/19/19  Visit # / Visits authorized: 1/ 50    Time In: 4:00 pm  Time Out: 5:00 pm  Total Billable Time: 30 minutes    Precautions: Diabetes and Fall    Subjective     Pt reports: she is having no pain today. Patient states when walking she sometimes feels "wobbly"   She was compliant with home exercise program.  Response to previous treatment: felt good   Functional change: none at this time     Pain: 0/10  Location: bilateral back      Objective     Mariann received therapeutic exercises to develop strength, endurance, ROM, flexibility, posture and core stabilization for 30 minutes including:         Date  08/22/19 08/19/19   VISIT 2/50 1/50   G CODE VISIT 2/10 1/10   POC EXP. DATE 10/19/19 10/19/19   VISIT AMOUNT  MEDICARE TOTAL 60.64  173.84 113.20   FACE-TO-FACE 09/19/19 09/19/19          TABLE:      HSS w/ strap 10 x 10" --   Piriformis stretch 10 x 10" --   Hip flexor stretch 2 x 30" --   TA's 15 x 5" --   Bridge  1 x 15  --   LTR 1 x 15  --   Marching  1 x 15 --   SAQ 1 x 15 --   SLR 1 x 10 --   Hip abduction 1 x 15 RTB --   Hip adduction 15 x 5" w/ball  --          SEATED:      LAQs 1 x 15 --   Seated Hip Flex 1 x 15 --   Sit to stand w/o UE 1 x 10  --          STANDING:      Heel raises 1 x 10  --   Mini squats 1 x 10  --   Tandem Walking  4 x 8 ft --   Tandem Stance  2 x 30" ea --   SL balance  2 x 30" ea --   Step Ups 1 x 10  --                 Initials BJ SYED       Mariann participated in neuromuscular re-education activities to improve: Balance, " "Coordination and Proprioception for 10 minutes. The following activities were included: the above activities beneath "STANDING"      Mariann participated in gait training to improve functional mobility and safety for 10 minutes, including: gait training with no AD with PT using CGA for 1 x 88ft.       Home Exercises Provided and Patient Education Provided     Education provided:   - patient is to use walker only for long distances as needed outside of the home   - patient is to use cane majority of the time and use walker as a back up due to onset of fatigue    Written Home Exercises Provided: yes.  Exercises were reviewed and Mariann was able to demonstrate them prior to the end of the session.  Mariann demonstrated good  understanding of the education provided.     See EMR under Patient Instructions for exercises provided 8/19/2019.    Assessment     Mariann completed the above exercise with verbal and tactile cueing to perform with proper form and correct technique. Patient was able to tolerated today's exercises with minimal difficulty. Patient performed sit to stands without UE support, in which she had moderate difficulty performing. Patient demonstrated a posterior lean once in standing and had trouble pushing into standing due to decrease quad strength. Patient participated in gait training with no AD for 88 ft., CGA. During gait training patient LOB x3 however was able to regain her footing. Will continue to monitor patient progress.     Mariann is progressing well towards her goals.   Pt prognosis is Good.     Pt will continue to benefit from skilled outpatient physical therapy to address the deficits listed in the problem list box on initial evaluation, provide pt/family education and to maximize pt's level of independence in the home and community environment.     Pt's spiritual, cultural and educational needs considered and pt agreeable to plan of care and goals.     Anticipated barriers to " physical therapy: none     Goals:     Short Term Goals: 4 weeks   Patient will be able to ambulate for 30 minutes with SPC  on even and uneven with min-no pain/difficulty in order to grocery shop. IN PROGRESS  Patient will be able to perform sit to stand transfer without UE support with min-no pain/difficulty. IN PROGRESS  Patient will be able to perform bed mobility with min-no pain/difficulty. IN PROGRESS  Increase SL balance to 10 seconds (B) with minimal LOB in order to reduce risk of falls and increase global stability. IN PROGRESS     Long Term Goals: 8 weeks   Patient will be able to ambulate for 1 hour with SPC on even and uneven with min-no pain/difficulty in order to grocery shop. IN PROGRESS  Patient will be able to sit for 2 hours in order to watch movie with family. IN PROGRESS2  Patient will be able to transfer in and out of the tub SBA in order to bath independently. IN PROGRESS    Plan     Increase standing exercises and SLR's next visit.     Mallory Dunne, PT, DPT

## 2019-08-22 NOTE — PROGRESS NOTES
Subjective:       Patient ID: Mariann Huff is a 59 y.o. female.    Chief Complaint: Follow-up (1 Month)    HPI     59-year-old female here for one-month follow-up.  Patient was admitted to the hospital in July for sepsis and Staph infection.  She was discharged with antibiotics.  She has been doing well since she was last here.  Dr. Boyd changed her stent last month.  She plans to have the ICV filter retrieved in September.  She is still on one of the antibiotics.    Diabetes - Her BG are running in the low 100s.  Lab Results   Component Value Date    HGBA1C 5.4 05/24/2019    HGBA1C 10.8 (H) 02/19/2019    HGBA1C 8.5 (H) 10/22/2018     Lab Results   Component Value Date    GLUF 217 (H) 09/15/2014    LDLCALC 134.0 01/21/2019    CREATININE 0.7 07/12/2019       Review of Systems    Objective:      Physical Exam   Constitutional: She is oriented to person, place, and time. She appears well-developed and well-nourished.   HENT:   Head: Normocephalic and atraumatic.   Mouth/Throat: No oropharyngeal exudate.   Eyes: Pupils are equal, round, and reactive to light. EOM are normal. Right eye exhibits no discharge. Left eye exhibits no discharge. No scleral icterus.   Neck: Normal range of motion. Neck supple. No tracheal deviation present. No thyromegaly present.   Cardiovascular: Normal rate, regular rhythm and normal heart sounds. Exam reveals no gallop and no friction rub.   No murmur heard.  Pulmonary/Chest: Effort normal and breath sounds normal. No respiratory distress. She has no wheezes. She has no rales. She exhibits no tenderness.   Abdominal: Soft. Bowel sounds are normal. She exhibits no distension and no mass. There is no tenderness. There is no rebound and no guarding.   Musculoskeletal: Normal range of motion. She exhibits no edema or tenderness.   Neurological: She is alert and oriented to person, place, and time.   Skin: Skin is warm and dry. No rash noted. No erythema. No pallor.   Psychiatric: She has a  normal mood and affect. Her behavior is normal.   Vitals reviewed.      Assessment:       1. Type 2 diabetes mellitus with diabetic peripheral angiopathy without gangrene, unspecified whether long term insulin use    2. Impaired functional mobility and endurance    3. Poor balance    4. Transection of ureter of left native kidney, subsequent encounter    5. Nephrostomy status    6. Hypertension associated with diabetes        Plan:       1.  Continue NovoLog 1-4 units 3 times daily  2/3.  Continue to follow up with physical therapy.  4/5.  Continue follow-up with Urology.  6.  Continue Norvasc 10 mg, Lopressor 25 mg b.i.d.

## 2019-08-23 ENCOUNTER — EXTERNAL HOME HEALTH (OUTPATIENT)
Dept: HOME HEALTH SERVICES | Facility: HOSPITAL | Age: 59
End: 2019-08-23
Payer: MEDICARE

## 2019-08-23 ENCOUNTER — TELEPHONE (OUTPATIENT)
Dept: CARDIOLOGY | Facility: CLINIC | Age: 59
End: 2019-08-23

## 2019-08-23 ENCOUNTER — TELEPHONE (OUTPATIENT)
Dept: HOME HEALTH SERVICES | Facility: HOSPITAL | Age: 59
End: 2019-08-23

## 2019-08-23 NOTE — TELEPHONE ENCOUNTER
----- Message from Adelaide Aguilar MD sent at 8/23/2019  3:39 PM CDT -----  Please inform the patient that her lipids are better, but HBA1c is getting worse again. She really needs to follow closely with Dr. Haney

## 2019-08-27 ENCOUNTER — PATIENT OUTREACH (OUTPATIENT)
Dept: ADMINISTRATIVE | Facility: OTHER | Age: 59
End: 2019-08-27

## 2019-08-27 ENCOUNTER — CLINICAL SUPPORT (OUTPATIENT)
Dept: REHABILITATION | Facility: HOSPITAL | Age: 59
End: 2019-08-27
Attending: NEUROLOGICAL SURGERY
Payer: MEDICARE

## 2019-08-27 DIAGNOSIS — R26.89 POOR BALANCE: ICD-10-CM

## 2019-08-27 DIAGNOSIS — R26.89 GAIT, ANTALGIC: ICD-10-CM

## 2019-08-27 PROCEDURE — 97110 THERAPEUTIC EXERCISES: CPT | Mod: PO

## 2019-08-27 PROCEDURE — 97112 NEUROMUSCULAR REEDUCATION: CPT | Mod: PO

## 2019-08-27 NOTE — PROGRESS NOTES
"  Physical Therapy Daily Treatment Note     Name: Mariann Huff  Clinic Number: [id number]    Therapy Diagnosis:   Encounter Diagnoses   Name Primary?    Poor balance     Gait, antalgic      Physician: Mk George MD    Visit Date: 8/27/2019    Physician Orders: PT Eval and Treat   Medical Diagnosis from Referral: Z98.1 (ICD-10-CM) - S/P lumbar fusion  Evaluation Date: 8/19/2019  Authorization Period Expiration: 07/21/2020  Plan of Care Expiration: 10/19/19  Visit # / Visits authorized: 3/ 50    Time In: 4:05 pm  Time Out: 4:58 pm  Total Billable Time: 53 minutes    Precautions: Diabetes and Fall    Subjective     Pt reports: she is having no pain today.   She was compliant with home exercise program.  Response to previous treatment: felt good   Functional change: none at this time     Pain: 0/10  Location: bilateral back      Objective     Mariann received therapeutic exercises to develop strength, endurance, ROM, flexibility, posture and core stabilization for 43 minutes including:         Date  08/27/19 08/22/19 08/19/19   VISIT 3/50 2/50 1/50   G CODE VISIT 3/10 2/10 1/10   POC EXP. DATE 10/19/19 10/19/19 10/19/19   VISIT AMOUNT  MEDICARE TOTAL 125.43  299.27 60.64  173.84 113.20   FACE-TO-FACE 09/19/19 09/19/19 09/19/19           TABLE:       HSS w/ strap 10 x 10" 10 x 10" --   Piriformis stretch 10 x 10" 10 x 10" --   Hip flexor stretch 2 x 30" 2 x 30" --   TA's 15 x 5" 15 x 5" --   Bridge  2 x 10 1 x 15  --   LTR 1 x 15 1 x 15  --   Marching  1 x 15 1 x 15 --   SAQ 2 x 10 1 x 15 --   SLR 2 x 10 1 x 10 --   Hip abduction 2 x 10 RTB 1 x 15 RTB --   Hip adduction 20 x 5" w/ ball 15 x 5" w/ball  --           SEATED:       LAQs 1 x 15 1 x 15 --   Seated Hip Flex 1 x 15 1 x 15 --   Sit to stand w/o UE 1 x 10 1 x 10  --           STANDING:       Heel raises 1 x 15 1 x 10  --   Mini squats 1 x 15 1 x 10  --   Tandem Walking  4 x 8 ft 4 x 8 ft --   Side stepping 4 x 8 ft     Tandem Stance  2 x 30" ea 2 x 30" " "ea --   SL balance  2 x 30" ea 2 x 30" ea --   Step Ups L1  1 x 10 ea 1 x 10  --                   Initials GWA 1/6 BJ BJ       Mariann participated in neuromuscular re-education activities to improve: Balance, Coordination and Proprioception for 10 minutes. The following activities were included: the above activities beneath "STANDING"      Mariann participated in gait training to improve functional mobility and safety for 10 minutes, including: gait training with no AD with PTA using SBA for 1 x 88ft.       Home Exercises Provided and Patient Education Provided     Education provided:   - patient is to use walker only for long distances as needed outside of the home   - patient is to use cane majority of the time and use walker as a back up due to onset of fatigue    Written Home Exercises Provided: yes.  Exercises were reviewed and Mariann was able to demonstrate them prior to the end of the session.  Mariann demonstrated good  understanding of the education provided.     See EMR under Patient Instructions for exercises provided 8/19/2019.    Assessment     Mariann completed the above exercise with verbal and tactile cueing to perform with proper form and correct technique and had no difficulty with new exercise added along with today's progressions.  Patient performed sit to stands without UE support, in which she had moderate difficulty performing the first two but minimal difficulty performing the rest.  Patient participated in gait training with no AD for 88 ft., CGA.     Mariann is progressing well towards her goals.   Pt prognosis is Good.     Pt will continue to benefit from skilled outpatient physical therapy to address the deficits listed in the problem list box on initial evaluation, provide pt/family education and to maximize pt's level of independence in the home and community environment.     Pt's spiritual, cultural and educational needs considered and pt agreeable to plan of care and goals.   "   Anticipated barriers to physical therapy: none     Goals:     Short Term Goals: 4 weeks   Patient will be able to ambulate for 30 minutes with SPC  on even and uneven with min-no pain/difficulty in order to grocery shop. IN PROGRESS  Patient will be able to perform sit to stand transfer without UE support with min-no pain/difficulty. IN PROGRESS  Patient will be able to perform bed mobility with min-no pain/difficulty. IN PROGRESS  Increase SL balance to 10 seconds (B) with minimal LOB in order to reduce risk of falls and increase global stability. IN PROGRESS     Long Term Goals: 8 weeks   Patient will be able to ambulate for 1 hour with SPC on even and uneven with min-no pain/difficulty in order to grocery shop. IN PROGRESS  Patient will be able to sit for 2 hours in order to watch movie with family. IN PROGRESS2  Patient will be able to transfer in and out of the tub SBA in order to bath independently. IN PROGRESS    Plan     Increase reps on seated exercises next visit.     Gualberto Murdock, PTA

## 2019-08-28 ENCOUNTER — TELEPHONE (OUTPATIENT)
Dept: INTERNAL MEDICINE | Facility: CLINIC | Age: 59
End: 2019-08-28

## 2019-08-29 ENCOUNTER — TELEPHONE (OUTPATIENT)
Dept: GASTROENTEROLOGY | Facility: CLINIC | Age: 59
End: 2019-08-29

## 2019-08-29 NOTE — TELEPHONE ENCOUNTER
Left message for patient to call the office to schedule an appointment with a different type of doctor to follow her condition.

## 2019-08-29 NOTE — TELEPHONE ENCOUNTER
----- Message from Cynthia Lindquist MD sent at 8/29/2019  7:51 AM CDT -----  This pt has appt with me this afternoon for f/u of a complicated admission at [facility].  I did not do any of these colonoscopies, and it appears she was a colorectal surgery pt.  F/u with me is NOT appropriate, she needs f/u with colorectal surgery.  Their op notes do not say what they thought the rectal ulcer was from so I have no idea.  Please ask Liliane to help you get a f/u appt for the pt with either Dr. Becerra in [place] or Dr. Orellana in [place].

## 2019-08-29 NOTE — TELEPHONE ENCOUNTER
----- Message from Cynthia Lindquist MD sent at 8/29/2019  7:51 AM CDT -----  This pt has appt with me this afternoon for f/u of a complicated admission at [facility].  I did not do any of these colonoscopies, and it appears she was a colorectal surgery pt.  F/u with me is NOT appropriate, she needs f/u with colorectal surgery.  Their op notes do not say what they thought the rectal ulcer was from so I have no idea.  Please ask Liliane to help you get a f/u appt for the pt with either Dr. Becerra in [place] or Dr. Orellana in [place].

## 2019-08-29 NOTE — TELEPHONE ENCOUNTER
Tried to reach patient again. No response. Scheduled patient with Dr. Becerra on 9/16/19 @ 3:00pm.

## 2019-09-03 ENCOUNTER — CLINICAL SUPPORT (OUTPATIENT)
Dept: REHABILITATION | Facility: HOSPITAL | Age: 59
End: 2019-09-03
Attending: NEUROLOGICAL SURGERY
Payer: MEDICARE

## 2019-09-03 DIAGNOSIS — R26.89 GAIT, ANTALGIC: ICD-10-CM

## 2019-09-03 DIAGNOSIS — R26.89 POOR BALANCE: ICD-10-CM

## 2019-09-03 PROCEDURE — 97110 THERAPEUTIC EXERCISES: CPT | Mod: PO

## 2019-09-03 NOTE — PROGRESS NOTES
"  Physical Therapy Daily Treatment Note     Name: Mariann Huff  Clinic Number: [id number]    Therapy Diagnosis:   Encounter Diagnoses   Name Primary?    Poor balance     Gait, antalgic      Physician: Mk George MD    Visit Date: 9/3/2019    Physician Orders: PT Eval and Treat   Medical Diagnosis from Referral: Z98.1 (ICD-10-CM) - S/P lumbar fusion  Evaluation Date: 8/19/2019  Authorization Period Expiration: 07/21/2020  Plan of Care Expiration: 10/19/19  Visit # / Visits authorized: 4/ 50    Time In: 4:00 pm  Time Out: 5:00 pm  Total Billable Time: 30 minutes    Precautions: Diabetes and Fall    Subjective     Pt reports: she is having no pain today    She was compliant with home exercise program.  Response to previous treatment: felt good   Functional change: walking more without the cane     Pain: 0/10  Location: bilateral back      Objective     Mariann received therapeutic exercises to develop strength, endurance, ROM, flexibility, posture and core stabilization for 30 minutes including:         Date  9/3/19 08/27/19 08/22/19 08/19/19   VISIT 4/50 3/50 2/50 1/50   G CODE VISIT 4/10 3/10 2/10 1/10   POC EXP. DATE 10/19/19 10/19/19 10/19/19 10/19/19   VISIT AMOUNT  MEDICARE TOTAL 60.64  359.91 125.43  299.27 60.64  173.84 113.20   FACE-TO-FACE 9/19/19 09/19/19 09/19/19 09/19/19            TABLE:        HSS w/ strap 10 x 10" 10 x 10" 10 x 10" --   Piriformis stretch 10 x 10" 10 x 10" 10 x 10" --   Hip flexor stretch 2 x 30" 2 x 30" 2 x 30" --   TA's 20 x 5" 15 x 5" 15 x 5" --   Bridge  2 x 10  2 x 10 1 x 15  --   LTR 2 x 10  1 x 15 1 x 15  --   Marching w/ TA 2 x 10  1 x 15 1 x 15 --   SAQ 2 x 10  2 x 10 1 x 15 --   SLR 2 x 10  2 x 10 1 x 10 --   Hip abduction 2 x 10 RTB 2 x 10 RTB 1 x 15 RTB --   Hip adduction 25 x 5"  20 x 5" w/ ball 15 x 5" w/ball  --            SEATED:        LAQs 2 x 10 1 x 15 1 x 15 --   Seated Hip Flex 2 x 10  1 x 15 1 x 15 --   Sit to stand w/o UE 1 x 15  1 x 10 1 x 10  --       " "     STANDING:        Heel raises 2 x 10  1 x 15 1 x 10  --   Mini squats 1 x 15  1 x 15 1 x 10  --   Tandem Walking  4 x 8 ft  4 x 8 ft 4 x 8 ft --   Side stepping 4 x 8 ft  4 x 8 ft     Tandem Stance  2 x 30" ea 2 x 30" ea 2 x 30" ea --   SL balance  2 x 30" ea 2 x 30" ea 2 x 30" ea --   Step Ups L1 1 x 15 ea L1  1 x 10 ea 1 x 10  --                     Initials BJ GWA 1/6 BJ SYED       Mariann participated in neuromuscular re-education activities to improve: Balance, Coordination and Proprioception for 10 minutes. The following activities were included: the above activities beneath "STANDING"      Mariann participated in gait training to improve functional mobility and safety for 10 minutes, including: gait training with no AD with PT using SBA for 1 x 728 ft.       Home Exercises Provided and Patient Education Provided     Education provided:   - walking without AD at home   - can use cane for long distances     Written Home Exercises Provided: yes.  Exercises were reviewed and Mariann was able to demonstrate them prior to the end of the session.  Mariann demonstrated good  understanding of the education provided.     See EMR under Patient Instructions for exercises provided 8/19/2019.    Assessment     Mariann completed the above exercise with verbal and tactile cueing to perform with proper form and correct technique and had no difficulty with new exercise added along with today's progressions.  Patient performed sit to stands without UE support, in which she had moderate difficulty controlling her descent into sitting. Patient continues to have difficulty with various balance activities, however is progressing to using 1 UE for safety. Patient participated in gait training with no AD for 728 ft., SBA. Patient required two rest breaks during gait training due to the onset of pain in the anterior and posterior aspect of the R hip. Patient did not have to sit during rest breaks, rather just a static standing " rest break.     Mariann is progressing well towards her goals.   Pt prognosis is Good.     Pt will continue to benefit from skilled outpatient physical therapy to address the deficits listed in the problem list box on initial evaluation, provide pt/family education and to maximize pt's level of independence in the home and community environment.     Pt's spiritual, cultural and educational needs considered and pt agreeable to plan of care and goals.     Anticipated barriers to physical therapy: none     Goals:     Short Term Goals: 4 weeks   Patient will be able to ambulate for 30 minutes with SPC  on even and uneven with min-no pain/difficulty in order to grocery shop. IN PROGRESS  Patient will be able to perform sit to stand transfer without UE support with min-no pain/difficulty. IN PROGRESS  Patient will be able to perform bed mobility with min-no pain/difficulty. IN PROGRESS  Increase SL balance to 10 seconds (B) with minimal LOB in order to reduce risk of falls and increase global stability. IN PROGRESS     Long Term Goals: 8 weeks   Patient will be able to ambulate for 1 hour with SPC on even and uneven with min-no pain/difficulty in order to grocery shop. IN PROGRESS  Patient will be able to sit for 2 hours in order to watch movie with family. IN PROGRESS2  Patient will be able to transfer in and out of the tub SBA in order to bath independently. IN PROGRESS    Plan     Increase SAQ, SLR, and bridging to 3 x 10 next visit.       Mallory Dunne, PT, DPT

## 2019-09-05 ENCOUNTER — CLINICAL SUPPORT (OUTPATIENT)
Dept: REHABILITATION | Facility: HOSPITAL | Age: 59
End: 2019-09-05
Attending: NEUROLOGICAL SURGERY
Payer: MEDICARE

## 2019-09-05 DIAGNOSIS — R26.89 POOR BALANCE: ICD-10-CM

## 2019-09-05 DIAGNOSIS — R26.89 GAIT, ANTALGIC: ICD-10-CM

## 2019-09-05 PROCEDURE — 97112 NEUROMUSCULAR REEDUCATION: CPT | Mod: PO

## 2019-09-05 PROCEDURE — 97110 THERAPEUTIC EXERCISES: CPT | Mod: PO

## 2019-09-05 NOTE — PROGRESS NOTES
"  Physical Therapy Daily Treatment Note     Name: Mariann Huff  Clinic Number: [id number]    Therapy Diagnosis:   Encounter Diagnoses   Name Primary?    Poor balance     Gait, antalgic      Physician: Mk George MD    Visit Date: 9/5/2019    Physician Orders: PT Eval and Treat   Medical Diagnosis from Referral: Z98.1 (ICD-10-CM) - S/P lumbar fusion  Evaluation Date: 8/19/2019  Authorization Period Expiration: 07/21/2020  Plan of Care Expiration: 10/19/19  Visit # / Visits authorized: 5/ 50    Time In: 4:00 pm  Time Out: 4:55 pm  Total Billable Time: 30 minutes    Precautions: Diabetes and Fall    Subjective     Pt reports: she is having no pain today    She was compliant with home exercise program.  Response to previous treatment: felt good   Functional change: walking more without the cane     Pain: 0/10  Location: bilateral back      Objective     Mariann received therapeutic exercises to develop strength, endurance, ROM, flexibility, posture and core stabilization for 20 minutes including:         Date  09/05/19 9/3/19 08/27/19 08/22/19 08/19/19   VISIT 5/50 4/50 3/50 2/50 1/50   G CODE VISIT 5/10 4/10 3/10 2/10 1/10   POC EXP. DATE 10/19/19 10/19/19 10/19/19 10/19/19 10/19/19   VISIT AMOUNT  MEDICARE TOTAL 64.79  424.70 60.64  359.91 125.43  299.27 60.64  173.84 113.20   FACE-TO-FACE 09/19/19 9/19/19 09/19/19 09/19/19 09/19/19             TABLE:         HSS w/ strap 10 x 10" 10 x 10" 10 x 10" 10 x 10" --   Piriformis stretch 10 x 10" 10 x 10" 10 x 10" 10 x 10" --   Hip flexor stretch 2 x 30" 2 x 30" 2 x 30" 2 x 30" --   TA's 20 x 5" 20 x 5" 15 x 5" 15 x 5" --   Bridge  3 x 10 2 x 10  2 x 10 1 x 15  --   LTR 2 x 10 2 x 10  1 x 15 1 x 15  --   Marching w/ TA 2 x 10 2 x 10  1 x 15 1 x 15 --   SAQ 3 x 10 2 x 10  2 x 10 1 x 15 --   SLR 3 x 10 2 x 10  2 x 10 1 x 10 --   Hip abduction 3 x 10 RTB 2 x 10 RTB 2 x 10 RTB 1 x 15 RTB --   Hip adduction 30 x 5" w/ ball 25 x 5"  20 x 5" w/ ball 15 x 5" w/ball  -- " "            SEATED:         LAQs 2 x 10 2 x 10 1 x 15 1 x 15 --   Seated Hip Flex 2 x 10 2 x 10  1 x 15 1 x 15 --   Sit to stand w/o UE 1 x 15 1 x 15  1 x 10 1 x 10  --             STANDING:         Heel raises 2 x 10 2 x 10  1 x 15 1 x 10  --   Mini squats 1 x 15 1 x 15  1 x 15 1 x 10  --   Tandem Walking  4 x 8 ft 4 x 8 ft  4 x 8 ft 4 x 8 ft --   Side stepping 4 x 8 ft 4 x 8 ft  4 x 8 ft     Tandem Stance  2 x 30" ea 2 x 30" ea 2 x 30" ea 2 x 30" ea --   SL balance  2 x 30" ea 2 x 30" ea 2 x 30" ea 2 x 30" ea --   Step Ups L1  1 x 15 ea L1 1 x 15 ea L1  1 x 10 ea 1 x 10  --                       Initials GWA 1/6 BJ GWA 1/6 BJ BJ     Functional limitation reporting disability percentage was obtained through use of FOTO lumbar spine Survey indicating 48% disability     Mariann participated in neuromuscular re-education activities to improve: Balance, Coordination and Proprioception for 10 minutes. The following activities were included: the above activities beneath "STANDING"      Mariann participated in gait training to improve functional mobility and safety for 6 minutes, including: gait training with no AD with PT using SBA for 1 x 193 ft.       Home Exercises Provided and Patient Education Provided     Education provided:   - walking without AD at home   - can use cane for long distances     Written Home Exercises Provided: yes.  Exercises were reviewed and Mariann was able to demonstrate them prior to the end of the session.  Mariann demonstrated good  understanding of the education provided.     See EMR under Patient Instructions for exercises provided 8/19/2019.    Assessment     Mariann completed the above exercise with verbal and tactile cueing to perform with proper form and correct technique and had no difficulty with today's progressions.  Patient performed sit to stands without UE support, in which she had moderate difficulty controlling her descent into sitting. Patient continues to have " difficulty with various balance activities, however is progressing to using 1 UE for safety. Patient participated in gait training with no AD for 193 ft., SBA. Patient required two rest breaks during gait training due to the onset of pain in the anterior and posterior aspect of the R hip. Patient did not have to sit during rest breaks, rather just a static standing rest break.     Mariann is progressing well towards her goals.   Pt prognosis is Good.     Pt will continue to benefit from skilled outpatient physical therapy to address the deficits listed in the problem list box on initial evaluation, provide pt/family education and to maximize pt's level of independence in the home and community environment.     Pt's spiritual, cultural and educational needs considered and pt agreeable to plan of care and goals.     Anticipated barriers to physical therapy: none     Goals:     Short Term Goals: 4 weeks   Patient will be able to ambulate for 30 minutes with SPC  on even and uneven with min-no pain/difficulty in order to grocery shop. IN PROGRESS  Patient will be able to perform sit to stand transfer without UE support with min-no pain/difficulty. IN PROGRESS  Patient will be able to perform bed mobility with min-no pain/difficulty. IN PROGRESS  Increase SL balance to 10 seconds (B) with minimal LOB in order to reduce risk of falls and increase global stability. IN PROGRESS    Long Term Goals: 8 weeks   Patient will be able to ambulate for 1 hour with SPC on even and uneven with min-no pain/difficulty in order to grocery shop. IN PROGRESS  Patient will be able to sit for 2 hours in order to watch movie with family. IN PROGRESS2  Patient will be able to transfer in and out of the tub SBA in order to bath independently. IN PROGRESS    Plan     Increase LAQ and seated hip flexion to 3 x 10 next visit.       Gualberto Murdock, PTA

## 2019-09-10 ENCOUNTER — CLINICAL SUPPORT (OUTPATIENT)
Dept: REHABILITATION | Facility: HOSPITAL | Age: 59
End: 2019-09-10
Attending: NEUROLOGICAL SURGERY
Payer: MEDICARE

## 2019-09-10 DIAGNOSIS — R26.89 POOR BALANCE: ICD-10-CM

## 2019-09-10 DIAGNOSIS — R26.89 GAIT, ANTALGIC: ICD-10-CM

## 2019-09-10 PROCEDURE — 97110 THERAPEUTIC EXERCISES: CPT | Mod: PO

## 2019-09-10 NOTE — PROGRESS NOTES
"  Physical Therapy Daily Treatment Note     Name: Mariann uHff  Clinic Number: [id number]    Therapy Diagnosis:   Encounter Diagnoses   Name Primary?    Poor balance     Gait, antalgic      Physician: Mk George MD    Visit Date: 9/10/2019    Physician Orders: PT Eval and Treat   Medical Diagnosis from Referral: Z98.1 (ICD-10-CM) - S/P lumbar fusion  Evaluation Date: 8/19/2019  Authorization Period Expiration: 07/21/2020  Plan of Care Expiration: 10/19/19  Visit # / Visits authorized: 6/ 50    Time In: 4:00 pm  Time Out: 4:53 pm  Total Billable Time: 30 minutes    Precautions: Diabetes and Fall    Subjective     Pt reports: she has been experiencing increased pain from over the weekend. Patient believe she may have done more activity than she usually does.   She was compliant with home exercise program.  Response to previous treatment: felt good   Functional change: walking more without the cane     Pain: 4/10  Location: bilateral back      Objective     Mariann received therapeutic exercises to develop strength, endurance, ROM, flexibility, posture and core stabilization for 30 minutes including:         Date  9/10/19 09/05/19 9/3/19 08/27/19 08/22/19 08/19/19   VISIT 6/50 5/50 4/50 3/50 2/50 1/50   G CODE VISIT 6/10 5/10 4/10 3/10 2/10 1/10   POC EXP. DATE 10/19/19 10/19/19 10/19/19 10/19/19 10/19/19 10/19/19   VISIT AMOUNT  MEDICARE TOTAL 60.64  485.34 64.79  424.70 60.64  359.91 125.43  299.27 60.64  173.84 113.20   FACE-TO-FACE 09/19/19 09/19/19 9/19/19 09/19/19 09/19/19 09/19/19              TABLE:          HSS w/ strap 10 x 10" 10 x 10" 10 x 10" 10 x 10" 10 x 10" --   Piriformis stretch 10 x 10" 10 x 10" 10 x 10" 10 x 10" 10 x 10" --   Hip flexor stretch 2 x 30" 2 x 30" 2 x 30" 2 x 30" 2 x 30" --   TA's 25 x 3" 20 x 5" 20 x 5" 15 x 5" 15 x 5" --   Bridge  3 x 10  3 x 10 2 x 10  2 x 10 1 x 15  --   LTR 2 x 10  2 x 10 2 x 10  1 x 15 1 x 15  --   Marching w/ TA 2 x 10 x 1#  2 x 10 2 x 10  1 x 15 1 x " "15 --   SAQ 3 x 10  3 x 10 2 x 10  2 x 10 1 x 15 --   SLR 2 x 10 x 1#  3 x 10 2 x 10  2 x 10 1 x 10 --   Hip abduction 3 x 10 RTB 3 x 10 RTB 2 x 10 RTB 2 x 10 RTB 1 x 15 RTB --   Hip adduction 30 x 5" w/ball 30 x 5" w/ ball 25 x 5"  20 x 5" w/ ball 15 x 5" w/ball  --              SEATED:          LAQs 2 x 10 x 1# 2 x 10 2 x 10 1 x 15 1 x 15 --   Seated Hip Flex 2 x 10 x 1# 2 x 10 2 x 10  1 x 15 1 x 15 --   Sit to stand w/o UE 1 x 15 1 x 15 1 x 15  1 x 10 1 x 10  --              STANDING:          Heel raises 3 x 10  2 x 10 2 x 10  1 x 15 1 x 10  --   Mini squats 1 x 15 1 x 15 1 x 15  1 x 15 1 x 10  --   Tandem Walking  4 x 8 ft  4 x 8 ft 4 x 8 ft  4 x 8 ft 4 x 8 ft --   Side stepping 4 x 8 ft  4 x 8 ft 4 x 8 ft  4 x 8 ft     Tandem Stance  2 x 30" ea  2 x 30" ea 2 x 30" ea 2 x 30" ea 2 x 30" ea --   SL balance  2 x 30" ea 2 x 30" ea 2 x 30" ea 2 x 30" ea 2 x 30" ea --   Step Ups L1 2 x 10 ea L1  1 x 15 ea L1 1 x 15 ea L1  1 x 10 ea 1 x 10  --                         Initials BJ GWA 1/6 BJ GWA 1/6 BJ BJ     Functional limitation reporting disability percentage was obtained through use of FOTO lumbar spine Survey indicating 48% disability     Mariann participated in neuromuscular re-education activities to improve: Balance, Coordination and Proprioception for 10 minutes. The following activities were included: the above activities beneath "STANDING"      Orantoinette participated in gait training to improve functional mobility and safety for 6 minutes, including: NOT TODAY gait training with no AD with PT using SBA for 1 x 193 ft.       Home Exercises Provided and Patient Education Provided     Education provided:   - continue balance training as tolerated   - resting after a long weekend     Written Home Exercises Provided: yes.  Exercises were reviewed and Mariann was able to demonstrate them prior to the end of the session.  Mariann demonstrated good  understanding of the education provided.     See EMR under " Patient Instructions for exercises provided 8/19/2019.    Assessment     Mariann completed the above exercise with verbal and tactile cueing to perform with proper form and correct technique and had no difficulty with today's progressions. Patient did experience some muscle fatigue however fatigue did resolve by the end of the treatment session.     Mariann is progressing well towards her goals.   Pt prognosis is Good.     Pt will continue to benefit from skilled outpatient physical therapy to address the deficits listed in the problem list box on initial evaluation, provide pt/family education and to maximize pt's level of independence in the home and community environment.     Pt's spiritual, cultural and educational needs considered and pt agreeable to plan of care and goals.     Anticipated barriers to physical therapy: none     Goals:     Short Term Goals: 4 weeks   Patient will be able to ambulate for 30 minutes with SPC  on even and uneven with min-no pain/difficulty in order to grocery shop. IN PROGRESS  Patient will be able to perform sit to stand transfer without UE support with min-no pain/difficulty. IN PROGRESS  Patient will be able to perform bed mobility with min-no pain/difficulty. IN PROGRESS  Increase SL balance to 10 seconds (B) with minimal LOB in order to reduce risk of falls and increase global stability. IN PROGRESS    Long Term Goals: 8 weeks   Patient will be able to ambulate for 1 hour with SPC on even and uneven with min-no pain/difficulty in order to grocery shop. IN PROGRESS  Patient will be able to sit for 2 hours in order to watch movie with family. IN PROGRESS2  Patient will be able to transfer in and out of the tub SBA in order to bath independently. IN PROGRESS    Plan     Increase supine hip abduction to GTB next visit.       Mallory Dunne, PT, DPT

## 2019-09-12 ENCOUNTER — CLINICAL SUPPORT (OUTPATIENT)
Dept: REHABILITATION | Facility: HOSPITAL | Age: 59
End: 2019-09-12
Attending: NEUROLOGICAL SURGERY
Payer: MEDICARE

## 2019-09-12 DIAGNOSIS — R26.89 GAIT, ANTALGIC: ICD-10-CM

## 2019-09-12 DIAGNOSIS — R26.89 POOR BALANCE: ICD-10-CM

## 2019-09-12 PROCEDURE — 97110 THERAPEUTIC EXERCISES: CPT | Mod: PO

## 2019-09-12 NOTE — PROGRESS NOTES
"  Physical Therapy Daily Treatment Note     Name: Mariann Huff  Clinic Number: [id number]    Therapy Diagnosis:   Encounter Diagnoses   Name Primary?    Poor balance     Gait, antalgic      Physician: Mk George MD    Visit Date: 9/12/2019    Physician Orders: PT Eval and Treat   Medical Diagnosis from Referral: Z98.1 (ICD-10-CM) - S/P lumbar fusion  Evaluation Date: 8/19/2019  Authorization Period Expiration: 07/21/2020  Plan of Care Expiration: 10/19/19  Visit # / Visits authorized: 7/ 50    Time In: 4:00 pm  Time Out: 4:55 pm  Total Billable Time: 55 minutes    Precautions: Diabetes and Fall    Subjective     Pt reports: her pain is not too bad today.   She was compliant with home exercise program.  Response to previous treatment: felt good   Functional change: walking more without the cane     Pain: 2/10  Location: bilateral back      Objective     Mariann received therapeutic exercises to develop strength, endurance, ROM, flexibility, posture and core stabilization for 55 minutes including:         Date  09/12/19 9/10/19 09/05/19 9/3/19 08/27/19 08/22/19 08/19/19   VISIT 7/50 6/50 5/50 4/50 3/50 2/50 1/50   G CODE VISIT 7/10 6/10 5/10 4/10 3/10 2/10 1/10   POC EXP. DATE 10/19/19 10/19/19 10/19/19 10/19/19 10/19/19 10/19/19 10/19/19   VISIT AMOUNT  MEDICARE TOTAL 121.28  606.62 60.64  485.34 64.79  424.70 60.64  359.91 125.43  299.27 60.64  173.84 113.20   FACE-TO-FACE 09/19/19 09/19/19 09/19/19 9/19/19 09/19/19 09/19/19 09/19/19               TABLE:           HSS w/ strap 10 x 10" 10 x 10" 10 x 10" 10 x 10" 10 x 10" 10 x 10" --   Piriformis stretch 10 x 10" 10 x 10" 10 x 10" 10 x 10" 10 x 10" 10 x 10" --   Hip flexor stretch 2 x 30" 2 x 30" 2 x 30" 2 x 30" 2 x 30" 2 x 30" --   TA's 25 x 3" 25 x 3" 20 x 5" 20 x 5" 15 x 5" 15 x 5" --   Bridge  3 x 10 3 x 10  3 x 10 2 x 10  2 x 10 1 x 15  --   LTR 2 x 10 2 x 10  2 x 10 2 x 10  1 x 15 1 x 15  --   Marching w/ TA 2 x 10 x 1# 2 x 10 x 1#  2 x 10 2 x 10  1 " "x 15 1 x 15 --   SAQ 2 x 10 x 1# 3 x 10  3 x 10 2 x 10  2 x 10 1 x 15 --   SLR 2 x 10 x 1# 2 x 10 x 1#  3 x 10 2 x 10  2 x 10 1 x 10 --   Hip abduction 3 x 10 GTB 3 x 10 RTB 3 x 10 RTB 2 x 10 RTB 2 x 10 RTB 1 x 15 RTB --   Hip adduction 30 x 5" w/ ball 30 x 5" w/ball 30 x 5" w/ ball 25 x 5"  20 x 5" w/ ball 15 x 5" w/ball  --               SEATED:           LAQs 3 x 10 x 1# 2 x 10 x 1# 2 x 10 2 x 10 1 x 15 1 x 15 --   Seated Hip Flex 2 x 10 x 1# 2 x 10 x 1# 2 x 10 2 x 10  1 x 15 1 x 15 --   Sit to stand w/o UE 1 x 15 1 x 15 1 x 15 1 x 15  1 x 10 1 x 10  --               STANDING:           Heel raises 3 x 10 3 x 10  2 x 10 2 x 10  1 x 15 1 x 10  --   Mini squats 1 x 15 1 x 15 1 x 15 1 x 15  1 x 15 1 x 10  --   Tandem Walking  4 x 8 ft 4 x 8 ft  4 x 8 ft 4 x 8 ft  4 x 8 ft 4 x 8 ft --   Side stepping 4 x 8 ft 4 x 8 ft  4 x 8 ft 4 x 8 ft  4 x 8 ft     Tandem Stance  2 x 30" ea 2 x 30" ea  2 x 30" ea 2 x 30" ea 2 x 30" ea 2 x 30" ea --   SL balance  2 x 30" ea 2 x 30" ea 2 x 30" ea 2 x 30" ea 2 x 30" ea 2 x 30" ea --   Step Ups L1  2 x 10 ea L1 2 x 10 ea L1  1 x 15 ea L1 1 x 15 ea L1  1 x 10 ea 1 x 10  --                           Initials GWA 1/6 BJ GWA 1/6 BJ GWA 1/6 BJ BJ       Mariann participated in neuromuscular re-education activities to improve: Balance, Coordination and Proprioception for 10 minutes. The following activities were included: the above activities beneath "STANDING"      Mariann participated in gait training to improve functional mobility and safety for 6 minutes, including: NOT TODAY gait training with no AD with PT using SBA for 1 x 193 ft.       Home Exercises Provided and Patient Education Provided     Education provided:   - continue balance training as tolerated   - resting after a long weekend     Written Home Exercises Provided: yes.  Exercises were reviewed and Mariann was able to demonstrate them prior to the end of the session.  Mariann demonstrated good  understanding of the " education provided.     See EMR under Patient Instructions for exercises provided 8/19/2019.    Assessment     Mariann completed the above exercise with verbal and tactile cueing to perform with proper form and correct technique and had no difficulty with today's progressions with no increase in symptoms prior to leaving the clinic.      Mariann is progressing well towards her goals.   Pt prognosis is Good.     Pt will continue to benefit from skilled outpatient physical therapy to address the deficits listed in the problem list box on initial evaluation, provide pt/family education and to maximize pt's level of independence in the home and community environment.     Pt's spiritual, cultural and educational needs considered and pt agreeable to plan of care and goals.     Anticipated barriers to physical therapy: none     Goals:     Short Term Goals: 4 weeks   Patient will be able to ambulate for 30 minutes with SPC  on even and uneven with min-no pain/difficulty in order to grocery shop. IN PROGRESS  Patient will be able to perform sit to stand transfer without UE support with min-no pain/difficulty. IN PROGRESS  Patient will be able to perform bed mobility with min-no pain/difficulty. IN PROGRESS  Increase SL balance to 10 seconds (B) with minimal LOB in order to reduce risk of falls and increase global stability. IN PROGRESS    Long Term Goals: 8 weeks   Patient will be able to ambulate for 1 hour with SPC on even and uneven with min-no pain/difficulty in order to grocery shop. IN PROGRESS  Patient will be able to sit for 2 hours in order to watch movie with family. IN PROGRESS2  Patient will be able to transfer in and out of the tub SBA in order to bath independently. IN PROGRESS    Plan     Increase reps with seated hip flexion next visit.       Gualberto Murdock, PTA

## 2019-09-16 ENCOUNTER — OFFICE VISIT (OUTPATIENT)
Dept: SURGERY | Facility: CLINIC | Age: 59
End: 2019-09-16
Payer: MEDICARE

## 2019-09-16 VITALS
HEART RATE: 80 BPM | SYSTOLIC BLOOD PRESSURE: 201 MMHG | DIASTOLIC BLOOD PRESSURE: 104 MMHG | HEIGHT: 64 IN | BODY MASS INDEX: 24.01 KG/M2 | WEIGHT: 140.63 LBS

## 2019-09-16 DIAGNOSIS — K62.6 RECTAL ULCER: Primary | ICD-10-CM

## 2019-09-16 PROCEDURE — 3080F PR MOST RECENT DIASTOLIC BLOOD PRESSURE >= 90 MM HG: ICD-10-PCS | Mod: CPTII,S$GLB,, | Performed by: COLON & RECTAL SURGERY

## 2019-09-16 PROCEDURE — 99999 PR PBB SHADOW E&M-EST. PATIENT-LVL III: CPT | Mod: PBBFAC,,, | Performed by: COLON & RECTAL SURGERY

## 2019-09-16 PROCEDURE — 3008F BODY MASS INDEX DOCD: CPT | Mod: CPTII,S$GLB,, | Performed by: COLON & RECTAL SURGERY

## 2019-09-16 PROCEDURE — 3080F DIAST BP >= 90 MM HG: CPT | Mod: CPTII,S$GLB,, | Performed by: COLON & RECTAL SURGERY

## 2019-09-16 PROCEDURE — 3077F SYST BP >= 140 MM HG: CPT | Mod: CPTII,S$GLB,, | Performed by: COLON & RECTAL SURGERY

## 2019-09-16 PROCEDURE — 3008F PR BODY MASS INDEX (BMI) DOCUMENTED: ICD-10-PCS | Mod: CPTII,S$GLB,, | Performed by: COLON & RECTAL SURGERY

## 2019-09-16 PROCEDURE — 3077F PR MOST RECENT SYSTOLIC BLOOD PRESSURE >= 140 MM HG: ICD-10-PCS | Mod: CPTII,S$GLB,, | Performed by: COLON & RECTAL SURGERY

## 2019-09-16 PROCEDURE — 99999 PR PBB SHADOW E&M-EST. PATIENT-LVL III: ICD-10-PCS | Mod: PBBFAC,,, | Performed by: COLON & RECTAL SURGERY

## 2019-09-16 PROCEDURE — 99203 PR OFFICE/OUTPT VISIT, NEW, LEVL III, 30-44 MIN: ICD-10-PCS | Mod: S$GLB,,, | Performed by: COLON & RECTAL SURGERY

## 2019-09-16 PROCEDURE — 99203 OFFICE O/P NEW LOW 30 MIN: CPT | Mod: S$GLB,,, | Performed by: COLON & RECTAL SURGERY

## 2019-09-16 RX ORDER — RIFAMPIN 300 MG/1
CAPSULE ORAL
COMMUNITY
Start: 2019-09-09 | End: 2020-02-12

## 2019-09-16 NOTE — LETTER
September 30, 2019      Jasbir Haney MD  [address]  [place] [address]           [facility]  [address]  [facility] [address]  Phone: [phone]          Patient: Mariann Huff   MR Number: [id number]   YOB: 1960   Date of Visit: 9/16/2019       Dear Dr. Haney:    Thank you for referring Mariann Huff to me for evaluation. Attached you will find relevant portions of my assessment and plan of care.    If you have questions, please do not hesitate to call me. I look forward to following Mariann Huff along with you.    Sincerely,    Som Becerra MD    Enclosure  CC:  No Recipients    If you would like to receive this communication electronically, please contact [email or web address] or [phone] to request more information on [facility] access.    For providers and/or their staff who would like to refer a patient to [facility], please contact us through our one-stop-shop provider referral line, [facility] , at [phone].    If you feel you have received this communication in error or would no longer like to receive these types of communications, please e-mail [email or web address]

## 2019-09-17 ENCOUNTER — CLINICAL SUPPORT (OUTPATIENT)
Dept: REHABILITATION | Facility: HOSPITAL | Age: 59
End: 2019-09-17
Attending: NEUROLOGICAL SURGERY
Payer: MEDICARE

## 2019-09-17 DIAGNOSIS — R26.89 POOR BALANCE: ICD-10-CM

## 2019-09-17 DIAGNOSIS — R26.89 GAIT, ANTALGIC: ICD-10-CM

## 2019-09-17 PROCEDURE — 97112 NEUROMUSCULAR REEDUCATION: CPT | Mod: PO

## 2019-09-17 PROCEDURE — 97110 THERAPEUTIC EXERCISES: CPT | Mod: PO

## 2019-09-17 NOTE — PROGRESS NOTES
"  Physical Therapy Daily Treatment Note     Name: Mariann Huff  Clinic Number: [id number]    Therapy Diagnosis:   Encounter Diagnoses   Name Primary?    Poor balance     Gait, antalgic      Physician: Mk George MD    Visit Date: 9/17/2019    Physician Orders: PT Eval and Treat   Medical Diagnosis from Referral: Z98.1 (ICD-10-CM) - S/P lumbar fusion  Evaluation Date: 8/19/2019  Authorization Period Expiration: 07/21/2020  Plan of Care Expiration: 10/19/19  Visit # / Visits authorized: 8/ 50    Time In: 4:00 pm  Time Out: 4:50 pm  Total Billable Time: 25 minutes    Precautions: Diabetes and Fall    Subjective     Pt reports: having a lot of back pain today, no know reason for increase.   She was compliant with home exercise program.  Response to previous treatment: felt good   Functional change: walking more without the cane     Pain: 5/10  Location: bilateral back      Objective     Mariann received therapeutic exercises to develop strength, endurance, ROM, flexibility, posture and core stabilization for 25 minutes including:         Date  09/17/19 09/12/19 9/10/19 09/05/19 9/3/19 08/27/19 08/22/19 08/19/19   VISIT 8/50 7/50 6/50 5/50 4/50 3/50 2/50 1/50   G CODE VISIT 8/10 7/10 6/10 5/10 4/10 3/10 2/10 1/10   POC EXP. DATE 10/19/19 10/19/19 10/19/19 10/19/19 10/19/19 10/19/19 10/19/19 10/19/19   VISIT AMOUNT  MEDICARE TOTAL 64.79  671.41 121.28  606.62 60.64  485.34 64.79  424.70 60.64  359.91 125.43  299.27 60.64  173.84 113.20   FACE-TO-FACE 09/19/19 09/19/19 09/19/19 09/19/19 9/19/19 09/19/19 09/19/19 09/19/19                TABLE:            HSS w/ strap 10 x 10" 10 x 10" 10 x 10" 10 x 10" 10 x 10" 10 x 10" 10 x 10" --   Piriformis stretch 10 x 10" 10 x 10" 10 x 10" 10 x 10" 10 x 10" 10 x 10" 10 x 10" --   Hip flexor stretch 2 x 30" 2 x 30" 2 x 30" 2 x 30" 2 x 30" 2 x 30" 2 x 30" --   TA's 25 x 3" 25 x 3" 25 x 3" 20 x 5" 20 x 5" 15 x 5" 15 x 5" --   Bridge  3 x 10 3 x 10 3 x 10  3 x 10 2 x 10  2 x 10 " "1 x 15  --   LTR 2 x 10 2 x 10 2 x 10  2 x 10 2 x 10  1 x 15 1 x 15  --   Marching w/ TA 3 x 10 x 1# 2 x 10 x 1# 2 x 10 x 1#  2 x 10 2 x 10  1 x 15 1 x 15 --   SAQ 3 x 10 x 1# 2 x 10 x 1# 3 x 10  3 x 10 2 x 10  2 x 10 1 x 15 --   SLR 3 x 10 x 1# 2 x 10 x 1# 2 x 10 x 1#  3 x 10 2 x 10  2 x 10 1 x 10 --   Hip abduction 3 x 10 GTB 3 x 10 GTB 3 x 10 RTB 3 x 10 RTB 2 x 10 RTB 2 x 10 RTB 1 x 15 RTB --   Hip adduction 30 x 5" w/ ball 30 x 5" w/ ball 30 x 5" w/ball 30 x 5" w/ ball 25 x 5"  20 x 5" w/ ball 15 x 5" w/ball  --                SEATED:            LAQs 3 x 10 x 1# 3 x 10 x 1# 2 x 10 x 1# 2 x 10 2 x 10 1 x 15 1 x 15 --   Seated Hip Flex 3 x 10 x 1# 2 x 10 x 1# 2 x 10 x 1# 2 x 10 2 x 10  1 x 15 1 x 15 --   Sit to stand w/o UE 1 x 15 1 x 15 1 x 15 1 x 15 1 x 15  1 x 10 1 x 10  --                STANDING:            Heel raises 3 x 10 3 x 10 3 x 10  2 x 10 2 x 10  1 x 15 1 x 10  --   Mini squats 1 x 15 1 x 15 1 x 15 1 x 15 1 x 15  1 x 15 1 x 10  --   Tandem Walking  4 x 8 ft 4 x 8 ft 4 x 8 ft  4 x 8 ft 4 x 8 ft  4 x 8 ft 4 x 8 ft --   Side stepping 4 x 8 ft 4 x 8 ft 4 x 8 ft  4 x 8 ft 4 x 8 ft  4 x 8 ft     Tandem Stance  2 x 30" ea 2 x 30" ea 2 x 30" ea  2 x 30" ea 2 x 30" ea 2 x 30" ea 2 x 30" ea --   SL balance  2 x 30" ea 2 x 30" ea 2 x 30" ea 2 x 30" ea 2 x 30" ea 2 x 30" ea 2 x 30" ea --   Step Ups L1  2 x 10 ea L1  2 x 10 ea L1 2 x 10 ea L1  1 x 15 ea L1 1 x 15 ea L1  1 x 10 ea 1 x 10  --                             Initials GWA 2/6 GWA 1/6 BJ GWA 1/6 BJ GWA 1/6 BJ BJ       Mariann participated in neuromuscular re-education activities to improve: Balance, Coordination and Proprioception for 10 minutes. The following activities were included: the above activities beneath "STANDING"    Mariann participated in gait training to improve functional mobility and safety for 6 minutes, including: NOT TODAY gait training with no AD with PT using SBA for 1 x 193 ft.     Home Exercises Provided and Patient Education " Provided     Education provided:   - continue balance training as tolerated   - resting after a long weekend     Written Home Exercises Provided: yes.  Exercises were reviewed and Mariann was able to demonstrate them prior to the end of the session.  Mariann demonstrated good  understanding of the education provided.     See EMR under Patient Instructions for exercises provided 8/19/2019.    Assessment     Mariann completed the above exercise with verbal and tactile cueing to perform with proper form and correct technique, cues to move through full range of motion and had no difficulty with today's progressions with no increase in symptoms prior to leaving the clinic.      Mariann is progressing well towards her goals.   Pt prognosis is Good.     Pt will continue to benefit from skilled outpatient physical therapy to address the deficits listed in the problem list box on initial evaluation, provide pt/family education and to maximize pt's level of independence in the home and community environment.     Pt's spiritual, cultural and educational needs considered and pt agreeable to plan of care and goals.     Anticipated barriers to physical therapy: none     Goals:     Short Term Goals: 4 weeks   Patient will be able to ambulate for 30 minutes with SPC  on even and uneven with min-no pain/difficulty in order to grocery shop. IN PROGRESS  Patient will be able to perform sit to stand transfer without UE support with min-no pain/difficulty. IN PROGRESS  Patient will be able to perform bed mobility with min-no pain/difficulty. IN PROGRESS  Increase SL balance to 10 seconds (B) with minimal LOB in order to reduce risk of falls and increase global stability. IN PROGRESS    Long Term Goals: 8 weeks   Patient will be able to ambulate for 1 hour with SPC on even and uneven with min-no pain/difficulty in order to grocery shop. IN PROGRESS  Patient will be able to sit for 2 hours in order to watch movie with family. IN  PROGRESS2  Patient will be able to transfer in and out of the tub SBA in order to bath independently. IN PROGRESS    Plan     Increase reps with LTR next visit.       Gualberto Murdock, PTA

## 2019-09-19 ENCOUNTER — OFFICE VISIT (OUTPATIENT)
Dept: UROLOGY | Facility: CLINIC | Age: 59
End: 2019-09-19
Payer: MEDICARE

## 2019-09-19 VITALS
BODY MASS INDEX: 23.9 KG/M2 | HEIGHT: 64 IN | HEART RATE: 76 BPM | SYSTOLIC BLOOD PRESSURE: 177 MMHG | DIASTOLIC BLOOD PRESSURE: 82 MMHG | WEIGHT: 140 LBS

## 2019-09-19 DIAGNOSIS — Z93.6 NEPHROSTOMY STATUS: Primary | ICD-10-CM

## 2019-09-19 DIAGNOSIS — E46 PROTEIN MALNUTRITION: ICD-10-CM

## 2019-09-19 PROCEDURE — 3077F SYST BP >= 140 MM HG: CPT | Mod: CPTII,S$GLB,, | Performed by: UROLOGY

## 2019-09-19 PROCEDURE — 99215 PR OFFICE/OUTPT VISIT, EST, LEVL V, 40-54 MIN: ICD-10-PCS | Mod: S$GLB,,, | Performed by: UROLOGY

## 2019-09-19 PROCEDURE — 99999 PR PBB SHADOW E&M-EST. PATIENT-LVL V: ICD-10-PCS | Mod: PBBFAC,,, | Performed by: UROLOGY

## 2019-09-19 PROCEDURE — 3008F BODY MASS INDEX DOCD: CPT | Mod: CPTII,S$GLB,, | Performed by: UROLOGY

## 2019-09-19 PROCEDURE — 3008F PR BODY MASS INDEX (BMI) DOCUMENTED: ICD-10-PCS | Mod: CPTII,S$GLB,, | Performed by: UROLOGY

## 2019-09-19 PROCEDURE — 99999 PR PBB SHADOW E&M-EST. PATIENT-LVL V: CPT | Mod: PBBFAC,,, | Performed by: UROLOGY

## 2019-09-19 PROCEDURE — 3079F DIAST BP 80-89 MM HG: CPT | Mod: CPTII,S$GLB,, | Performed by: UROLOGY

## 2019-09-19 PROCEDURE — 99215 OFFICE O/P EST HI 40 MIN: CPT | Mod: S$GLB,,, | Performed by: UROLOGY

## 2019-09-19 PROCEDURE — 3077F PR MOST RECENT SYSTOLIC BLOOD PRESSURE >= 140 MM HG: ICD-10-PCS | Mod: CPTII,S$GLB,, | Performed by: UROLOGY

## 2019-09-19 PROCEDURE — 3079F PR MOST RECENT DIASTOLIC BLOOD PRESSURE 80-89 MM HG: ICD-10-PCS | Mod: CPTII,S$GLB,, | Performed by: UROLOGY

## 2019-09-19 NOTE — PROGRESS NOTES
CC: left nephrostomy tube following left ureteral injury. Last left neph tube changed on 7/24/19    Mariann Huff is a 59 y.o. woman who is here for the evaluation of left nephrostomy tube.  She is still undergoing her rehab with strengthening of the lower LE.  She walks with a cane.  She is here today with her .    Mariann Huff underwent left anterior L5-S1 interbody fusion who sustained a left ureteral transection on 4/12/19.  She underwent a ureteroureterostomy with stent placement at that time. She presented to the ER 4/20/19 with AMS, fever and respiratory distress. On imaging she was found to have a large amount of air and fluid within the retroperitoneal and within the left collecting system. This was not amenable to drainage by IR therefore she was taken to the OR for washout.     Procedure(s) Performed: 4/20/19  1. Exploratory laparotomy  2. Ligation of left ureter  3. Removal of left JJ ureteral stent    Findings:   - left retroperitoneum developed up to lower pole of the left kidney  - pockets of purulence identified and evacuated, cultures sent  - left ureteral anastomosis found to be completely broken down with stent clearly visible  - posterior to the ureter, spinal hardware was visibly exposed  - proximal end of the ureter clipped and stent removed as tissue felt to be unsuitable for repair  - no bowel injury noted    Subsequently she had left nephrostomy tube placed by IR on 4/20/19.  She was discharged from hospital after a long complicated course on 7/2/19.  She is here for urology follow up.  She reports that she has been able to void well thru the bladder as well as via left nephrostomy tube.    The below is summary of her recent discharge notes.  history of Asthma, HTN, HLD, CAD (LAD GINGER proximal and distal 2013 and GINGER ostial LAD 2/2014), HFpEF, NSTEMI, T2DM, Obesity, Sleep Apnea, and back pain who presented to the ED on 6/6/19 with acute shortness of breath started the night  before. Mrs. Huff has had two recent hospitalizations. 4/12/19-4/14/19 for L5-S1 OLIF with Neurosurgery with intraoperaative left ureteral injury with ureteroureteral anastomosis and ureteral stent placement. Second admission, 4/20/19-6/5/19 for intraabdominal abscess s/p washout and ligation of left ureter with nephrostomy tube placement on 4/21/19 and tracheostomy and PEG placement on 5/2/19.  Most recent hospitalization complicated by BUE and RLE DVT s/p IVC filter on 5/29/19 given concern for GIB with workup revealing rectal ulcer requiring pRBC transfusion. She was followed by ID during admission and is being treated with long term antibiotic therapy for E coli and Staph lugdenesis bacteremia with IV cefazolin and rifampin with end dated 6/18/19 given hardware.  She also completed 7 day course of cefepime for HAP (pseudomonas) on 5/25/19 and candida glabrata UTI that she received 7 days of high dose fluconazole.  She was discharged to rehab. She as admitted to ICU where she was found to have acute blood loss from rectal ulcer. Patient additionally developed NSTEMI from the anemia and pseudomonal hospital associated pneumonia. Imaging showed persisting fluid collection along the midline abdominal incision. IR aspirated fluid collection and cultures are pending. ID recommended continue meropenem until 6/15 and rifampin 300 mg BID and will require suppressive therapy indefinitely with cefadroxil and rifampin (due to hardware presence) once meropenem completed. She has intermittent back pain that is achy. Patient admitted to LTAC for IV antibiotics, ventilator weaning.  Patient completed antimicrobial therapy for infective intra-abdominal abscess.  Tolerated ventilator weaning, trach Decanulated 6/19. Rafampin stopped on 6/19 second to nausea.  Patient's appetite improved dysphagia resolved nausea vomiting resolved rifampin resumed at 600 mg 3 times a week per Infectious Disease recommendations nausea did not  return after resuming rifampin.  Patient advised to continue rifampin and cefadroxil indefinitely as suppressive therapy for possible hardware involvement.  Advised she will need a follow-up with Infectious Disease before stopping these medications.  She will also need follow-up with Urology for her left nephrostomy drain neurosurgery, in GI evaluate removing PEG tube.  Home health arranged with [facility].  DME ordered.  Patient advised inpatient rehab strongly recommended for weakness and debility status post recent fall last week.  Patient declined inpatient rehab adamant about returning home  in son to assist with patient care at home.  Discharge coordinated per , home health, and patient.  Patient provided with script for Norco for pain management until she can follow up with her primary care provider.      Hospital Course:     Acute on chronic respiratory failure with hypoxia, resolved   Hospital acquired pseudomonal pneumonia, resolved  S/P trach decannulation 6/19                 Continue scopalamine patch q3 days              Daily weights              Strict Is/Os              Lasix                    NSTEMI type II  CAD s/p stent  Essential HTN   Acute on chronic systolic and diastolic dysfunction, chronic, stable              ASA 81 gm daily              Atorvastatin 80 mg daily              Losartan 25 mg daily              Metoprolol 25 mg BID              Clopidogrel 75 mg daily due to recent NSTEMI              Risks factor modification,  Low salt diet, low-fat diet, exercise        Intraabdominal abscess   E. Coli and Staph lugdenesis bacteremia 2/2 abdominal infection                 Possible hardware involvement id advises suppressive therapy                          Cefadroxil 1g po bid and rifampin 300mg bid                           6/26 Communicated to ID patient's nausea resolved, tolerating diet. ID will evaluate patient and              make recommendations    Continue cefadroxil and rifampin indefinitely for suppression          DM II with HTN and CKD II                 Accucheck and SSI              Diabetic diet              Monitor renal function              BP control              Diabetes control              Diet modification     Moderate protein malnutrition, improving  Dysphagia, resolved  S/p gastrostomy                 SLP recommended regular diet              Monitor intake              GI follow-up for PEG tube removal     Hypernatremia, resolved        Debility, improving daiily                 Pt/OT              Ambulates with walker        Anemia of chronic infection  Stable  Monitor          Vertebral Hardware Infection, stable  S/p L5-S1 OLIF on 4/12 complicated by L ureteral injury  S/p ureteroureteral anastomoses and ureteral stent placement  S/p L nephrostomy 4/21                 Brace whenever out of bed              PT/OT, progressing well, ambulates in her room with walker              Follow up with neurosurgery              Follow up with urology              Good UOP out of drain and normal urination             ID rec continue suppression with Cefadroxil 1g po bid              Rifampin three times per week dosing     Hematuria, resolved  6/24 urology notified of dark blood in drainage bag  Remains afebrile no leukocytosis hemoglobin stable, urinating in toilet, if reoccurs order UA         Rectal ulcer                  Noted on sigmoidoscopy 5/13 and healing on colonoscopy 5/24              Avoid fecal collection devices     History of Reggie upper and lower DVT              S/p ivc filter 5/29              Heparin q8h---- consider transitioning to oral anticoag                            Heparin discontinued    Past Medical History:   Diagnosis Date    Anticoagulant long-term use     Asthma     Back pain     Bradycardia, unspecified 5/8/2019    The etiology of the bradycardic episode is unclear.  The have appear to be respiratory in origin  (at least the 1st episode).  We will continue to monitor carefully.  We are awaiting evaluation by Cardiology.      CAD (coronary artery disease)     s/p stentimg  (2), (1)    Carotid artery stenosis     Chronic combined systolic and diastolic CHF (congestive heart failure) 2019    Diabetes mellitus type 2 in obese     HTN (hypertension), benign     Hyperlipidemia     Keloid cicatrix     NPDR (nonproliferative diabetic retinopathy) 2015    NSTEMI (non-ST elevated myocardial infarction)     Nuclear sclerosis - Right Eye 3/18/2014    Primary localized osteoarthrosis, lower leg 2014    Sleep apnea      Past Surgical History:   Procedure Laterality Date    BRONCHOSCOPY N/A 2019    Performed by Sean Ruano MD at [facility] OR 2ND FLR    CARDIAC CATHETERIZATION      cataract extraction left eye      cataracts      CATHETERIZATION, HEART, LEFT Left 2014    Performed by Wilman Kim MD at [facility] CATH LAB     SECTION, LOW TRANSVERSE      COLONOSCOPY N/A 2019    Performed by Al Alaniz MD at [facility] ENDO (2ND FLR)    CORONARY ANGIOPLASTY      Creation, Nephrostomy, Percutaneous Left 2019    Performed by Karina Surgeon at [facility] KARINA    CREATION, TRACHEOSTOMY N/A 2019    Performed by Sean Ruano MD at [facility] OR 2ND FLR    EGD, WITH PEG TUBE INSERTION  2019    Performed by Sean [facility]ano MD at [facility] OR 2ND FLR    ESOPHAGOGASTRODUODENOSCOPY (EGD) N/A 2016    Performed by Gardenia Adamson MD at [facility] ENDO (4TH FLR)    EXCISION TURBINATE, SUBMUCOUS      FUSION, SPINE, LUMBAR, ANTERIOR APPROACH Left L5-S1 Anterior to Psoa Interbody Fusion, L5-S1 Posterior Instrumentation Left 2019    Performed by Mk George MD at [facility] OR 2ND FLR    HAND SURGERY Left     HAND SURGERY Right     torn ligament repair/ Dr. Yeboah    HYSTERECTOMY      Injection,steroid,epidural,transforaminal approach - Bilateral - S1 Bilateral 2018     [facility]    Injection-steroid-epidural-caudal N/A 2/7/2019    Performed by Dave Bentley Jr., MD at [facility] PAIN MGT    INSERTION-INTRAOCULAR LENS (IOL) Right 9/1/2015    Performed by Good Domingo MD at [facility] OR 1ST FLR    LAPAROTOMY, EXPLORATORY; LIGATION OF LEFT URETER; DOUBLE J STENT REMOVAL LEFT URETER Left 4/20/2019    Performed by Paul Carlson MD at [facility] OR 2ND FLR    left foot surgery      left wrist surgery      NASAL SEPTUM SURGERY  5/7/15    PHACOEMULSIFICATION-ASPIRATION-CATARACT Right 9/1/2015    Performed by Good Domingo MD at [facility] OR [facility] FLR    REPAIR, URETER  4/12/2019    Performed by Mk George MD at [facility] OR [facility] FLR    REPLACEMENT, NEPHROSTOMY TUBE N/A 7/24/2019    [facility]    REPLACEMENT, NEPHROSTOMY TUBE N/A 7/18/2019    [facility]    RESECTION-TURBINATES (SMR) N/A 5/7/2015    Performed by Dileep Dubois III, MD at [facility] OR 2ND [facility]    rt elbow surgery      S/P LAD COATED STENT  05/14/2010    6 total     S/P OM1 STENT  08/2003    SEPTOPLASTY N/A 5/7/2015    Performed by Dileep Dubois III, MD at [facility] OR [address] [place] [address]    SIGMOIDOSCOPY, FLEXIBLE N/A 5/21/2019    Performed by ALBERTO Amin MD at [facility] ENDO (2ND FLR)    SIGMOIDOSCOPY, FLEXIBLE N/A 5/13/2019    Performed by ALBERTO Amin MD at [facility] ENDO (2ND FLR)    SINUS SURGERY      F.E.S.S.    SINUS SURGERY FUNCTIONAL ENDOSCOPIC WITH NAVIGATION WITH MAXILLARIES, ETHMOIDS, LEFT SPHENOID, LEFT OLLY N/A 5/7/2015    Performed by Dileep Dubois III, MD at [facility] OR 2ND FLR    STENT, URETERAL placement  4/12/2019    [facility]    TUBAL LIGATION       Social History     Tobacco Use    Smoking status: Never Smoker    Smokeless tobacco: Never Used   Substance Use Topics    Alcohol use: No     Alcohol/week: 0.0 oz    Drug use:  No     Family History   Problem Relation Age of Onset    Diabetes Mother     Heart disease Mother     Diabetes Father     Leukemia Father         leukemia    Heart attack Father     Diabetes Sister     Diabetes Brother     Diabetes Sister     No Known Problems Sister     No Known Problems Brother     No Known Problems Brother     No Known Problems Maternal Grandmother     No Known Problems Maternal Grandfather     No Known Problems Paternal Grandmother     No Known Problems Paternal Grandfather     No Known Problems Son     No Known Problems Son     No Known Problems Maternal Aunt     No Known Problems Maternal Uncle     No Known Problems Paternal Aunt     No Known Problems Paternal Uncle     Colon cancer Neg Hx     Inflammatory bowel disease Neg Hx     Melanoma Neg Hx     Psoriasis Neg Hx     Lupus Neg Hx     Eczema Neg Hx     Acne Neg Hx     Amblyopia Neg Hx     Blindness Neg Hx     Cancer Neg Hx     Cataracts Neg Hx     Glaucoma Neg Hx     Hypertension Neg Hx     Macular degeneration Neg Hx     Retinal detachment Neg Hx     Strabismus Neg Hx     Stroke Neg Hx     Thyroid disease Neg Hx     Heart failure Neg Hx     Hyperlipidemia Neg Hx      Allergy:  Review of patient's allergies indicates:   Allergen Reactions    Milk containing products      Causes GI distress     Outpatient Encounter Medications as of 9/19/2019   Medication Sig Dispense Refill    ACCU-CHEK EDIN PLUS METER Misc       albuterol (PROVENTIL/VENTOLIN HFA) 90 mcg/actuation inhaler Inhale 2 puffs into the lungs every 6 (six) hours as needed for Wheezing. 1 each 11    albuterol-ipratropium (DUO-NEB) 2.5 mg-0.5 mg/3 mL nebulizer solution Inhale 3 mLs into the lungs.      amLODIPine (NORVASC) 10 MG tablet 10 mg by Tube route.      aspirin 81 mg Tab Take 81 mg by mouth. 1 Tablet Oral Every day      atorvastatin (LIPITOR) 80 MG tablet 1 tablet (80 mg total) by Per G Tube route once daily. 90 tablet 3     blood sugar diagnostic (ACCU-CHEK EDIN PLUS TEST STRP) Strp TEST BLOOD SUGARS 4 TIMES DAILY 150 strip 11    clopidogrel (PLAVIX) 75 mg tablet Take 1 tablet (75 mg total) by mouth once daily. 30 tablet 3    escitalopram oxalate (LEXAPRO) 10 MG tablet Take 1 tablet (10 mg total) by mouth once daily. 30 tablet 11    famotidine (PEPCID) 20 MG tablet Take 1 tablet (20 mg total) by mouth 2 (two) times daily. 60 tablet 11    furosemide (LASIX) 40 MG tablet Take 1 tablet (40 mg total) by mouth once daily. 30 tablet 11    gabapentin (NEURONTIN) 300 MG capsule Take 1 capsule (300 mg total) by mouth 2 (two) times daily. 60 capsule 11    HYDROcodone-acetaminophen (NORCO) 5-325 mg per tablet Take 1 tablet by mouth every 6 (six) hours as needed for Pain. 28 tablet 0    insulin aspart U-100 (NOVOLOG) 100 unit/mL (3 mL) InPn pen Inject 1-10 Units into the skin 4 (four) times daily before meals and nightly. 12 mL 11    losartan (COZAAR) 50 MG tablet Take 1 tablet (50 mg total) by mouth once daily. 90 tablet 3    metoprolol tartrate (LOPRESSOR) 25 MG tablet Take 1 tablet (25 mg total) by mouth 2 (two) times daily. 60 tablet 11    multivitamin (THERAGRAN) per tablet Take 1 tablet by mouth once daily.      nitroGLYCERIN (NITROSTAT) 0.4 MG SL tablet Take one every 2-3 min till chestpain relief for 3 times and if still no relief, call MD or come to ED 30 tablet 11    ondansetron (ZOFRAN-ODT) 8 MG TbDL Take 1 tablet (8 mg total) by mouth every 12 (twelve) hours as needed. 30 tablet 2    rifAMpin (RIFADIN) 300 MG capsule       [] ciprofloxacin HCl (CIPRO) 500 MG tablet Take 1 tablet (500 mg total) by mouth 2 (two) times daily. for 14 doses 14 tablet 0    [DISCONTINUED] cefadroxil (DURICEF) 1 gram tablet Take 1 tablet (1 g total) by mouth 2 (two) times daily. 60 tablet 3    [DISCONTINUED] insulin lispro (HUMALOG U-100 INSULIN) 100 unit/mL injection Inject 6 Units into the skin.      [DISCONTINUED] rifAMpin (RIFADIN)  300 MG capsule Take 2 capsules (600 mg total) by mouth every Mon, Wed, Fri. 30 capsule 2     Facility-Administered Encounter Medications as of 9/19/2019   Medication Dose Route Frequency Provider Last Rate Last Dose    ciprofloxacin HCl tablet 500 mg  500 mg Oral Once Robin Boyd MD        lidocaine (PF) 10 mg/ml (1%) injection 10 mg  1 mL Intradermal Once Dave Bentley Jr., MD        lidocaine HCl 2% urojet   Urethral Once Robin Boyd MD         Review of Systems   ROS  Physical Exam     Vitals:    09/19/19 1312   BP: (!) 177/82   Pulse: 76     Physical Exam   Constitutional: She is oriented to person, place, and time. She appears well-developed and well-nourished. No distress.   On a wheel chair, walks with an assistance   HENT:   Head: Normocephalic and atraumatic.   Right Ear: External ear normal.   Left Ear: External ear normal.   Nose: Nose normal.   Eyes: Conjunctivae and EOM are normal. Pupils are equal, round, and reactive to light. No scleral icterus.   Neck: Normal range of motion. Neck supple. No JVD present. No tracheal deviation present. No thyromegaly present.   Cardiovascular: Normal rate, regular rhythm, normal heart sounds and intact distal pulses.  Exam reveals no gallop and no friction rub.    No murmur heard.  Pulmonary/Chest: Effort normal and breath sounds normal. No respiratory distress. She has no wheezes.   Abdominal: Soft. Bowel sounds are normal. She exhibits no distension and no mass. There is no tenderness. There is no rebound and no guarding.   Genitourinary: No vaginal discharge found.   Genitourinary Comments: Left neph tube in place.  I changed the neph tube bag per pt's request today.  Sterile technique was used in replacing the drainage bag.   Musculoskeletal: Normal range of motion. She exhibits no edema, tenderness or deformity.   Lymphadenopathy:     She has no cervical adenopathy.   Neurological: She is alert and oriented to person, place, and time.   Skin: Skin  is warm and dry. She is not diaphoretic.     Psychiatric: She has a normal mood and affect. Her behavior is normal. Thought content normal.         LABS:  Lab Results   Component Value Date    CREATININE 0.7 07/12/2019    CREATININE 0.7 07/01/2019    CREATININE 0.9 06/27/2019     Urine Culture, Routine   Date Value Ref Range Status   06/06/2019 No growth  Final     Radiology  KUB 6/17/19  Intestinal gas pattern demonstrates gas predominantly within the stomach and colon.  There is no significant gaseous distention of large or small bowel loops, with no findings to specifically suggest intestinal obstruction.  No conventional radiographic evidence of organomegaly or intra-abdominal/pelvic soft tissue mass.  Nephrostomy catheter on the left side and an inferior vena caval filter projected to the right of midline at L2-3 are again incidentally observed, as are postoperative changes related to an L5/S1 instrumented spinal fusion procedure.    Assessment and Plan:  Mariann was seen today for left nephrostomy tube.    Diagnoses and all orders for this visit:    Nephrostomy status  -     Comprehensive metabolic panel; Future  -     IR Nephrostomy Tube Change; Standing  -     Ambulatory Referral to Interventional Radiology    Protein malnutrition    I explained her urologic problems in detail to pt and her .  Eventually we need to left psoas hitch or Boari flap to connect left proximal ureter to the bladder.  She needs to fully recovered from her recent hospitalization.  Her albumin is still low and his hemoglobin A1c can be better controlled in order for her to undergo another major surgery.    and she needs to be better nutrition wise.  Consider IMPACT Advanced Recover supplement prior to her surgery.    So for now, will plan to replace her left nephrostomy by IR every 3 months.  She will have left neph tube replacement scheduled next Thurs.    Will see her in 3 months and see how she comes along in her  recovery.    I spent 40 minutes with the patient of which more than half was spent in direct consultation with the patient in regards to our treatment and plan.    Follow-up:  Follow up in about 3 months (around 12/19/2019), or nephrostomy follow up.

## 2019-09-20 ENCOUNTER — LAB VISIT (OUTPATIENT)
Dept: LAB | Facility: HOSPITAL | Age: 59
End: 2019-09-20
Attending: UROLOGY
Payer: MEDICARE

## 2019-09-20 ENCOUNTER — TELEPHONE (OUTPATIENT)
Dept: UROLOGY | Facility: CLINIC | Age: 59
End: 2019-09-20

## 2019-09-20 DIAGNOSIS — E87.6 HYPOKALEMIA: Primary | ICD-10-CM

## 2019-09-20 DIAGNOSIS — Z93.6 NEPHROSTOMY STATUS: ICD-10-CM

## 2019-09-20 LAB
ALBUMIN SERPL BCP-MCNC: 3.2 G/DL (ref 3.5–5.2)
ALP SERPL-CCNC: 112 U/L (ref 55–135)
ALT SERPL W/O P-5'-P-CCNC: 13 U/L (ref 10–44)
ANION GAP SERPL CALC-SCNC: 10 MMOL/L (ref 8–16)
AST SERPL-CCNC: 14 U/L (ref 10–40)
BILIRUB SERPL-MCNC: 0.6 MG/DL (ref 0.1–1)
BUN SERPL-MCNC: 14 MG/DL (ref 6–20)
CALCIUM SERPL-MCNC: 9.8 MG/DL (ref 8.7–10.5)
CHLORIDE SERPL-SCNC: 103 MMOL/L (ref 95–110)
CO2 SERPL-SCNC: 31 MMOL/L (ref 23–29)
CREAT SERPL-MCNC: 0.8 MG/DL (ref 0.5–1.4)
EST. GFR  (AFRICAN AMERICAN): >60 ML/MIN/1.73 M^2
EST. GFR  (NON AFRICAN AMERICAN): >60 ML/MIN/1.73 M^2
GLUCOSE SERPL-MCNC: 207 MG/DL (ref 70–110)
POTASSIUM SERPL-SCNC: 3.2 MMOL/L (ref 3.5–5.1)
PROT SERPL-MCNC: 7.5 G/DL (ref 6–8.4)
SODIUM SERPL-SCNC: 144 MMOL/L (ref 136–145)

## 2019-09-20 PROCEDURE — 80053 COMPREHEN METABOLIC PANEL: CPT

## 2019-09-20 PROCEDURE — 36415 COLL VENOUS BLD VENIPUNCTURE: CPT | Mod: PO

## 2019-09-20 RX ORDER — POTASSIUM CHLORIDE 20 MEQ/1
20 TABLET, EXTENDED RELEASE ORAL 2 TIMES DAILY
Qty: 60 TABLET | Refills: 11 | Status: ON HOLD | OUTPATIENT
Start: 2019-09-20 | End: 2023-06-13 | Stop reason: CLARIF

## 2019-09-20 NOTE — TELEPHONE ENCOUNTER
Hypokalemia  -     potassium chloride SA (K-DUR,KLOR-CON) 20 MEQ tablet; Take 1 tablet (20 mEq total) by mouth 2 (two) times daily.  Dispense: 60 tablet; Refill: 11  -     Basic metabolic panel; Future; Expected date: 09/20/2019    left a message for her to start taking K dur 20 mEq BID for 1 month.  Repeat BMP in 1 month.   Then most likely she can take 20 mEq once a day.

## 2019-09-20 NOTE — PROGRESS NOTES
See Join The Wellness Team message. Frances Bobo LPN .............9/20/2019  10:12 AM     Subjective:    Patient ID: Mariann Huff is a 57 y.o. female.    Chief Complaint: Foot Pain (right foot )      HPI:   Mariann is a 57 y.o. female who presents to the podiatry clinic  with complaint of  right foot pain. Onset of the symptoms was several weeks ago. Precipitating event: none known. Current symptoms include: ability to bear weight, but with some pain, swelling and worsening symptoms after a period of activity. Aggravating factors: any weight bearing. Symptoms have gradually worsened. Patient has had no prior foot problems. Evaluation to date: none. Treatment to date: none. Patients rates pain 7/10 on pain scale.    DOI: ~ 6/2/17 12/13/17:  58 yo F f/u for 5th MT fx.  She was given a CAM boot and told to be nwb - she did not follow instructinos and is not wearing boot today.     1/4/18:  follow up right 5th metatarsal fracture since about June.  She still has pain in the area.   She is wearing flat slip on shoes today.   Xrays done      2/15/18:  follow up right 5th metatarsal fracture.  She has been using a bone stimulator.  She states that pain is improving a little.  She is driving so she is not wearing the CAM boot.  She states that she sometimes wear it on the weekends.  She is wearing soft casual shoes today.             Past Medical History:   Diagnosis Date    Anticoagulant long-term use     Asthma     Back pain     CAD (coronary artery disease)     s/p stentimg 2003 (2),2009 (1)    Carotid artery stenosis     Diabetes mellitus type 2 in obese     HTN (hypertension), benign     Hyperlipidemia     Keloid cicatrix     NPDR (nonproliferative diabetic retinopathy) 8/17/2015    NSTEMI (non-ST elevated myocardial infarction)     Nuclear sclerosis - Right Eye 3/18/2014    Primary localized osteoarthrosis, lower leg 6/18/2014    Sleep apnea      Past Surgical History:   Procedure Laterality Date    CARDIAC CATHETERIZATION      cataract extraction left eye      cataracts       " SECTION, LOW TRANSVERSE      CORONARY ANGIOPLASTY      EXCISION TURBINATE, SUBMUCOUS      HAND SURGERY Left     HAND SURGERY Right     torn ligament repair/ Dr. Yeboah    HYSTERECTOMY      left foot surgery      left wrist surgery      NASAL SEPTUM SURGERY  5/7/15    rt elbow surgery      S/P LAD COATED STENT  2010    6 total     S/P OM1 STENT  2003    SINUS SURGERY      F.E.S.S.   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 No    Breast-Feeding No     Social History Narrative    . 2 children.          Current Outpatient Prescriptions:     ACCU-CHEK EDIN PLUS METER Misc, , Disp: , Rfl:     albuterol 90 mcg/actuation inhaler, Inhale 1-2 puffs into the lungs every 6 (six) hours as needed., Disp: 1 each, Rfl: 0    amlodipine (NORVASC) 10 MG tablet, TAKE 1 TABLET (10 MG TOTAL) BY MOUTH ONCE DAILY., Disp: 30 tablet, Rfl: 11    aspirin 81 mg Tab, Take 81 mg by mouth. 1 Tablet Oral Every day, Disp: , Rfl:     atorvastatin (LIPITOR) 40 MG tablet, TAKE 1 TABLET (40 MG TOTAL) BY MOUTH ONCE DAILY., Disp: 30 tablet, Rfl: 11    BD INSULIN PEN NEEDLE UF MINI 31 x 3/16 " Ndle, USE 4 TIMES A DAY, Disp: 200 each, Rfl: 11    blood sugar diagnostic (ACCU-CHEK EDIN PLUS TEST STRP) Strp, TEST BLOOD SUGARS 4 TIMES DAILY, Disp: 150 strip, Rfl: 11    chlorthalidone (HYGROTEN) 25 MG Tab, Take 1 tablet (25 mg total) by mouth once daily., Disp: 30 " "tablet, Rfl: 11    cyclobenzaprine (FLEXERIL) 10 MG tablet, TAKE 1 TABLET BY MOUTH 3 TIMES A DAY AS NEEDED FOR MUSCLE SPASMS. NO WORKING OR DRIVING ON THIS MED, Disp: 45 tablet, Rfl: 5    dulaglutide (TRULICITY) 0.75 mg/0.5 mL PnIj, Inject 0.5 mLs (0.75 mg total) into the skin every 7 days., Disp: 4 Syringe, Rfl: 3    esomeprazole (NEXIUM) 40 MG capsule, Take 1 capsule (40 mg total) by mouth before breakfast., Disp: 90 capsule, Rfl: 3    fenofibrate micronized (LOFIBRA) 134 MG Cap, TAKE 1 CAPSULE (134 MG TOTAL) BY MOUTH BEFORE BREAKFAST., Disp: 30 capsule, Rfl: 6    insulin aspart (NOVOLOG) 100 unit/mL InPn pen, 35 U in am, 35 units noon, 35 units pm; 150-200 +2, 201-250 + 4, 251-300 + 6, 301-350 +8, >350 +10 and call MD (Patient taking differently: 30 U in am, 30 units noon, 30 units pm; 150-200 +2, 201-250 + 4, 251-300 + 6, 301-350 +8, >350 +10 and call MD), Disp: 20 mL, Rfl: 11    insulin glargine, TOUJEO, (TOUJEO SOLOSTAR) 300 unit/mL (1.5 mL) InPn pen, Inject 50 Units into the skin 2 (two) times daily. (Patient taking differently: Inject 40 Units into the skin 2 (two) times daily. ), Disp: 6 Syringe, Rfl: 11    INVOKANA 300 mg Tab tablet, Take 1 tablet (300 mg total) by mouth once daily., Disp: 30 tablet, Rfl: 11    lancets 30 gauge Misc, , Disp: , Rfl:     losartan (COZAAR) 100 MG tablet, TAKE 1 TABLET (100 MG TOTAL) BY MOUTH ONCE DAILY., Disp: 30 tablet, Rfl: 11    metoprolol succinate (TOPROL-XL) 100 MG 24 hr tablet, TAKE 1 TABLET (100 MG TOTAL) BY MOUTH ONCE DAILY., Disp: 30 tablet, Rfl: 0    nitroGLYCERIN (NITROSTAT) 0.4 MG SL tablet, Take one every 2-3 min till chestpain relief for 3 times and if still no relief, call MD or come to ED, Disp: 30 tablet, Rfl: 11    omega-3 acid ethyl esters (LOVAZA) 1 gram capsule, Take 1 g by mouth once daily. , Disp: , Rfl:     pen needle, diabetic (BD ULTRA-FINE GRABIEL PEN NEEDLES) 32 gauge x 5/32" Ndle, For use with insulin pens daily 4 times a day, Disp: 100 " "each, Rfl: 11    prasugrel (EFFIENT) 10 mg Tab, TAKE 1 TABLET (10 MG TOTAL) BY MOUTH ONCE DAILY., Disp: 30 tablet, Rfl: 10    SURE COMFORT PEN NEEDLE 31 gauge x 5/16" Ndle, , Disp: , Rfl:     tramadol (ULTRAM) 50 mg tablet, Take 1 tablet (50 mg total) by mouth 3 (three) times daily as needed for Pain., Disp: 60 tablet, Rfl: 1    zolpidem (AMBIEN) 5 MG Tab, TAKE 1 TABLET (5 MG TOTAL) BY MOUTH NIGHTLY AS NEEDED., Disp: 30 tablet, Rfl: 0  Review of patient's allergies indicates:  No Known Allergies    ROS  ROS Constitutional:  General Appearance: well nourished  Vascular: negative for cramps, edema and bruising  Musculoskeletal: negative for joint paint and joint edema  Skin: negative for rashes and lesions  Neurological: negative for burning, tingling and numbness  Gastrointestinal: negative for stomach pain, nausea and vomiting        Objective:        /80   Pulse 72   Ht 5' 4" (1.626 m)   Wt 71.7 kg (158 lb)   BMI 27.12 kg/m²     Ortho/SPM Exam  Physical Exam  LE exam con't:  V: DP 2/4, PT 2/4, CRT< 3s to all digits tested.     N: SILT in SP/DP/T/Maxi/Saph distributions.    Derm: Skin intact. No erythema, edema or ecchymosis.     Ortho:  +Motor EHL/FHL/TA/GA   +TTP 5th metatarsal R foot   Compartments soft/compressible. No pain on passive stretch of big toe. No calf  pain.        Assessment:     Imaging / Labs:    X-ray Foot Complete Right    Result Date: 5/22/2017  There appears to be an incomplete fracture through the mid fifth metatarsal in area of the patient's pain.  Mild degenerative changes are seen to first MP joint.  Some degenerative changes seen at the bases of the first metatarsal and calcaneal spurs are noted.     Fracture through the mid fifth metatarsal Electronically signed by: Florin Cooley MD Date:     05/22/17 Time:    13:40     Narrative     Time of Procedure: 07/07/17 08:18:33  Accession # [id number]    Comparison: 5/22/2017, 1333 hrs.    Number of views: 3.    Findings:  There is " a healing transverse fracture of the mid diaphysis of the fifth metatarsal with fracture fragments in satisfactory position and alignment.  Fracture line remains conspicuous.    No new abnormality identified in this patient with mild degenerative change at the first MTP and spurring along the dorsal aspects of the tarsal bones.  Inferior calcaneal spur noted.   Impression       Please see above.       Electronically signed by: Fay Loyola MD  Date: 07/07/17  Time: 08:40        Xrays  1/4/18 Findings:  There is a healing fracture of the mid diaphysis of the fifth metatarsal with mature callus formation.  Fragments remain in satisfactory position and alignment.      There is mild spurring at the head of the first metatarsal with hallux rigidus and spurring along the dorsal aspects of the TMT's plus spur is at the inferior posterior aspects of the calcaneus.  Hammertoe deformity affects the second and third toes and possibly the fourth and fifth toes.    I detect no new injury and no lytic or blastic lesion.    There is calcific atherosclerosis, more than typical for a patient of this age.  Please correlate for possible predisposing metabolic factors.            1. Closed nondisplaced fracture of fifth metatarsal bone of right foot with delayed healing, subsequent encounter          Plan:          Procedures  .   Mariann was seen today for foot pain.    Diagnoses and all orders for this visit:    Closed nondisplaced fracture of fifth metatarsal bone of right foot with delayed healing, subsequent encounter        Mariann Huff is a 57 y.o. female presenting w/   1. Closed nondisplaced fracture of fifth metatarsal bone of right foot with delayed healing, subsequent encounter      DOI: ~ 6/2/17    -pt seen, evaluated, and managed  -dx discussed in detail. All questions/concerns addressed  -all tx options discussed. All alternatives, risks, benefits of all txs discussed  -rxs dispensed: none  -xr today reviewed:  callus forming. Bone healed according to xrays though she still has pain.   -return to CAM boot; RICE  -urged compliance  -continue bone stimulator.       follow up in a month or sooner if concerned.

## 2019-09-23 DIAGNOSIS — Z93.6 NEPHROSTOMY STATUS: Primary | ICD-10-CM

## 2019-09-24 ENCOUNTER — DOCUMENTATION ONLY (OUTPATIENT)
Dept: REHABILITATION | Facility: HOSPITAL | Age: 59
End: 2019-09-24

## 2019-09-24 ENCOUNTER — CLINICAL SUPPORT (OUTPATIENT)
Dept: REHABILITATION | Facility: HOSPITAL | Age: 59
End: 2019-09-24
Attending: NEUROLOGICAL SURGERY
Payer: MEDICARE

## 2019-09-24 DIAGNOSIS — R26.89 GAIT, ANTALGIC: ICD-10-CM

## 2019-09-24 DIAGNOSIS — R26.89 POOR BALANCE: ICD-10-CM

## 2019-09-24 PROCEDURE — 97112 NEUROMUSCULAR REEDUCATION: CPT | Mod: PO

## 2019-09-24 PROCEDURE — 97110 THERAPEUTIC EXERCISES: CPT | Mod: PO

## 2019-09-24 NOTE — PROGRESS NOTES
Face to Face PTA Conference performed with Gualberto Murdock, PTA regarding patient's current status, overall progress, and plan of care    Face to Face PTA Conference performed with Mayra Valdes, PT regarding patient's current status, overall progress, and plan of care    Gualberto Murdock  9/24/2019

## 2019-09-24 NOTE — PROGRESS NOTES
"  Physical Therapy Daily Treatment Note     Name: Mariann Huff  Clinic Number: [id number]    Therapy Diagnosis:   Encounter Diagnoses   Name Primary?    Poor balance     Gait, antalgic      Physician: Mk George MD    Visit Date: 9/24/2019    Physician Orders: PT Eval and Treat   Medical Diagnosis from Referral: Z98.1 (ICD-10-CM) - S/P lumbar fusion  Evaluation Date: 8/19/2019  Authorization Period Expiration: 07/21/2020  Plan of Care Expiration: 10/19/19  Visit # / Visits authorized: 9/ 50    Time In: 4:00 pm  Time Out: 4:50 pm  Total Billable Time: 25 minutes    Precautions: Diabetes and Fall    Subjective     Pt reports: she continues with a lot of back pain and fatigues easily.  She was compliant with home exercise program.  Response to previous treatment: felt good   Functional change: walking more without the cane     Pain: 5/10  Location: bilateral back      Objective     Mariann received therapeutic exercises to develop strength, endurance, ROM, flexibility, posture and core stabilization for 15 minutes including:         Date  09/24/19 09/17/19 09/12/19 9/10/19 09/05/19 9/3/19 08/27/19 08/22/19 08/19/19   VISIT 9/50 8/50 7/50 6/50 5/50 4/50 3/50 2/50 1/50   G CODE VISIT 9/10 8/10 7/10 6/10 5/10 4/10 3/10 2/10 1/10   POC EXP. DATE 10/19/19 10/19/19 10/19/19 10/19/19 10/19/19 10/19/19 10/19/19 10/19/19 10/19/19   VISIT AMOUNT  MEDICARE TOTAL 64.79  736.20 64.79  671.41 121.28  606.62 60.64  485.34 64.79  424.70 60.64  359.91 125.43  299.27 60.64  173.84 113.20   FACE-TO-FACE 10/24/19 09/19/19 09/19/19 09/19/19 09/19/19 9/19/19 09/19/19 09/19/19 09/19/19                 TABLE:             HSS w/ strap 10 x 10" 10 x 10" 10 x 10" 10 x 10" 10 x 10" 10 x 10" 10 x 10" 10 x 10" --   Piriformis stretch 10 x 10" 10 x 10" 10 x 10" 10 x 10" 10 x 10" 10 x 10" 10 x 10" 10 x 10" --   Hip flexor stretch 2 x 30" 2 x 30" 2 x 30" 2 x 30" 2 x 30" 2 x 30" 2 x 30" 2 x 30" --   TA's 25 x 3" 25 x 3" 25 x 3" 25 x 3" 20 x " "5" 20 x 5" 15 x 5" 15 x 5" --   Bridge  3 x 10 3 x 10 3 x 10 3 x 10  3 x 10 2 x 10  2 x 10 1 x 15  --   LTR 3 x 10 2 x 10 2 x 10 2 x 10  2 x 10 2 x 10  1 x 15 1 x 15  --   Marching w/ TA 3 x 10 x 1# 3 x 10 x 1# 2 x 10 x 1# 2 x 10 x 1#  2 x 10 2 x 10  1 x 15 1 x 15 --   SAQ 3 x 10 x 1# 3 x 10 x 1# 2 x 10 x 1# 3 x 10  3 x 10 2 x 10  2 x 10 1 x 15 --   SLR 3 x 10 x 1# 3 x 10 x 1# 2 x 10 x 1# 2 x 10 x 1#  3 x 10 2 x 10  2 x 10 1 x 10 --   Hip abduction 3 x 10 GTB 3 x 10 GTB 3 x 10 GTB 3 x 10 RTB 3 x 10 RTB 2 x 10 RTB 2 x 10 RTB 1 x 15 RTB --   Hip adduction 30 x 5" w/ ball 30 x 5" w/ ball 30 x 5" w/ ball 30 x 5" w/ball 30 x 5" w/ ball 25 x 5"  20 x 5" w/ ball 15 x 5" w/ball  --                 SEATED:             LAQs 3 x 10 x 1# 3 x 10 x 1# 3 x 10 x 1# 2 x 10 x 1# 2 x 10 2 x 10 1 x 15 1 x 15 --   Seated Hip Flex 3 x 10 x 1# 3 x 10 x 1# 2 x 10 x 1# 2 x 10 x 1# 2 x 10 2 x 10  1 x 15 1 x 15 --   Sit to stand w/o UE 1 x 15 1 x 15 1 x 15 1 x 15 1 x 15 1 x 15  1 x 10 1 x 10  --                 STANDING:             Heel raises 3 x 10 3 x 10 3 x 10 3 x 10  2 x 10 2 x 10  1 x 15 1 x 10  --   Mini squats 1 x 15 1 x 15 1 x 15 1 x 15 1 x 15 1 x 15  1 x 15 1 x 10  --   Tandem Walking  4 x 8 ft 4 x 8 ft 4 x 8 ft 4 x 8 ft  4 x 8 ft 4 x 8 ft  4 x 8 ft 4 x 8 ft --   Side stepping 4 x 8 ft 4 x 8 ft 4 x 8 ft 4 x 8 ft  4 x 8 ft 4 x 8 ft  4 x 8 ft     Tandem Stance  2 x 30" ea 2 x 30" ea 2 x 30" ea 2 x 30" ea  2 x 30" ea 2 x 30" ea 2 x 30" ea 2 x 30" ea --   SL balance  2 x 30" ea 2 x 30" ea 2 x 30" ea 2 x 30" ea 2 x 30" ea 2 x 30" ea 2 x 30" ea 2 x 30" ea --   Step Ups L1  2 x 10 ea L1  2 x 10 ea L1  2 x 10 ea L1 2 x 10 ea L1  1 x 15 ea L1 1 x 15 ea L1  1 x 10 ea 1 x 10  --                               Initials GWA 3/6 GWA 2/6 GWA 1/6 BJ GWA 1/6 BJ GWA 1/6 BJ BJ       Elizater participated in neuromuscular re-education activities to improve: Balance, Coordination and Proprioception for 10 minutes. The following activities were included: " "the above activities beneath "STANDING"    Mariann participated in gait training to improve functional mobility and safety for 6 minutes, including: NOT TODAY gait training with no AD with PT using SBA for 1 x 193 ft.     Home Exercises Provided and Patient Education Provided     Education provided:   - continue balance training as tolerated   - resting after a long weekend     Written Home Exercises Provided: yes.  Exercises were reviewed and Mariann was able to demonstrate them prior to the end of the session.  Mariann demonstrated good  understanding of the education provided.     See EMR under Patient Instructions for exercises provided 8/19/2019.    Assessment     Mariann completed the above exercise with verbal and tactile cueing to perform with proper form and correct technique, cues to move through full range of motion along with today's progressions.  Patient requires occasional short rest breaks due to fatigue when performing her standing exercises.      Mariann is progressing well towards her goals.   Pt prognosis is Good.     Pt will continue to benefit from skilled outpatient physical therapy to address the deficits listed in the problem list box on initial evaluation, provide pt/family education and to maximize pt's level of independence in the home and community environment.     Pt's spiritual, cultural and educational needs considered and pt agreeable to plan of care and goals.     Anticipated barriers to physical therapy: none     Goals:     Short Term Goals: 4 weeks   Patient will be able to ambulate for 30 minutes with SPC  on even and uneven with min-no pain/difficulty in order to grocery shop. IN PROGRESS  Patient will be able to perform sit to stand transfer without UE support with min-no pain/difficulty. IN PROGRESS  Patient will be able to perform bed mobility with min-no pain/difficulty. IN PROGRESS  Increase SL balance to 10 seconds (B) with minimal LOB in order to reduce risk of falls " and increase global stability. IN PROGRESS    Long Term Goals: 8 weeks   Patient will be able to ambulate for 1 hour with SPC on even and uneven with min-no pain/difficulty in order to grocery shop. IN PROGRESS  Patient will be able to sit for 2 hours in order to watch movie with family. IN PROGRESS2  Patient will be able to transfer in and out of the tub SBA in order to bath independently. IN PROGRESS    Plan     Continue with Plan Of Care.       Gualberto Murdock, PTA

## 2019-09-26 ENCOUNTER — CLINICAL SUPPORT (OUTPATIENT)
Dept: REHABILITATION | Facility: HOSPITAL | Age: 59
End: 2019-09-26
Attending: NEUROLOGICAL SURGERY
Payer: MEDICARE

## 2019-09-26 DIAGNOSIS — R26.89 POOR BALANCE: ICD-10-CM

## 2019-09-26 DIAGNOSIS — R26.89 GAIT, ANTALGIC: ICD-10-CM

## 2019-09-26 PROCEDURE — G8990 OTHER PT/OT CURRENT STATUS: HCPCS | Mod: CL,PO

## 2019-09-26 PROCEDURE — G8991 OTHER PT/OT GOAL STATUS: HCPCS | Mod: CK,PO

## 2019-09-26 PROCEDURE — 97112 NEUROMUSCULAR REEDUCATION: CPT | Mod: PO

## 2019-09-26 PROCEDURE — 97110 THERAPEUTIC EXERCISES: CPT | Mod: PO

## 2019-09-26 NOTE — PROGRESS NOTES
"  Physical Therapy Daily Treatment Note     Name: Mariann Huff  Clinic Number: [id number]    Therapy Diagnosis:   Encounter Diagnoses   Name Primary?    Poor balance     Gait, antalgic      Physician: Mk George MD    Visit Date: 9/26/2019    Physician Orders: PT Eval and Treat   Medical Diagnosis from Referral: Z98.1 (ICD-10-CM) - S/P lumbar fusion  Evaluation Date: 8/19/2019  Authorization Period Expiration: 07/21/2020  Plan of Care Expiration: 10/19/19  Visit # / Visits authorized: 10/ 50    Time In: 4:00 pm  Time Out: 4:55 pm  Total Billable Time: 55 minutes    Precautions: Diabetes and Fall    Subjective     Pt reports: continued back pain, decreased strength, and endurance causing difficulty with ADL's..  She was compliant with home exercise program.  Response to previous treatment: felt good   Functional change: walking more without the cane     Pain: 5/10  Location: bilateral back      Objective     Mariann received therapeutic exercises to develop strength, endurance, ROM, flexibility, posture and core stabilization for 45 minutes including:         Date  9/26/19 09/24/19 09/17/19 09/12/19 9/10/19 09/05/19 9/3/19 08/27/19 08/22/19 08/19/19   VISIT 10/50 9/50 8/50 7/50 6/50 5/50 4/50 3/50 2/50 1/50   G CODE VISIT 1/10  10/26/19 9/10 8/10 7/10 6/10 5/10 4/10 3/10 2/10 1/10   POC EXP. DATE 10/19/19 10/19/19 10/19/19 10/19/19 10/19/19 10/19/19 10/19/19 10/19/19 10/19/19 10/19/19   VISIT AMOUNT  MEDICARE TOTAL 125.43  861.63 64.79  736.20 64.79  671.41 121.28  606.62 60.64  485.34 64.79  424.70 60.64  359.91 125.43  299.27 60.64  173.84 113.20   FACE-TO-FACE 10/24/19 10/24/19 09/19/19 09/19/19 09/19/19 09/19/19 9/19/19 09/19/19 09/19/19 09/19/19                  TABLE:              HSS w/ strap 10 x 10" 10 x 10" 10 x 10" 10 x 10" 10 x 10" 10 x 10" 10 x 10" 10 x 10" 10 x 10" --   Piriformis stretch 10 x 10" 10 x 10" 10 x 10" 10 x 10" 10 x 10" 10 x 10" 10 x 10" 10 x 10" 10 x 10" --   Hip flexor stretch " "2 x 30" 2 x 30" 2 x 30" 2 x 30" 2 x 30" 2 x 30" 2 x 30" 2 x 30" 2 x 30" --   TA's 25 x 3" 25 x 3" 25 x 3" 25 x 3" 25 x 3" 20 x 5" 20 x 5" 15 x 5" 15 x 5" --   Bridge  3 x 10 3 x 10 3 x 10 3 x 10 3 x 10  3 x 10 2 x 10  2 x 10 1 x 15  --   LTR 3 x 10 3 x 10 2 x 10 2 x 10 2 x 10  2 x 10 2 x 10  1 x 15 1 x 15  --   Marching w/ TA 3 x 10 x 2# 3 x 10 x 1# 3 x 10 x 1# 2 x 10 x 1# 2 x 10 x 1#  2 x 10 2 x 10  1 x 15 1 x 15 --   SAQ 3 x 10 x 2# 3 x 10 x 1# 3 x 10 x 1# 2 x 10 x 1# 3 x 10  3 x 10 2 x 10  2 x 10 1 x 15 --   SLR 3 x 10 x 2# 3 x 10 x 1# 3 x 10 x 1# 2 x 10 x 1# 2 x 10 x 1#  3 x 10 2 x 10  2 x 10 1 x 10 --   Hip abduction 3 x 10 BTB 3 x 10 GTB 3 x 10 GTB 3 x 10 GTB 3 x 10 RTB 3 x 10 RTB 2 x 10 RTB 2 x 10 RTB 1 x 15 RTB --   Hip adduction 30 x 5" w/ball 30 x 5" w/ ball 30 x 5" w/ ball 30 x 5" w/ ball 30 x 5" w/ball 30 x 5" w/ ball 25 x 5"  20 x 5" w/ ball 15 x 5" w/ball  --                  SEATED:              LAQs 3 x 10 x 2# 3 x 10 x 1# 3 x 10 x 1# 3 x 10 x 1# 2 x 10 x 1# 2 x 10 2 x 10 1 x 15 1 x 15 --   Seated Hip Flex 3 x 10 x 2# 3 x 10 x 1# 3 x 10 x 1# 2 x 10 x 1# 2 x 10 x 1# 2 x 10 2 x 10  1 x 15 1 x 15 --   Sit to stand w/o UE 1 x 15 1 x 15 1 x 15 1 x 15 1 x 15 1 x 15 1 x 15  1 x 10 1 x 10  --                  STANDING:              Heel raises 3 x 10 3 x 10 3 x 10 3 x 10 3 x 10  2 x 10 2 x 10  1 x 15 1 x 10  --   Mini squats 2 x 10 1 x 15 1 x 15 1 x 15 1 x 15 1 x 15 1 x 15  1 x 15 1 x 10  --   Tandem Walking  4 x 8 ft 4 x 8 ft 4 x 8 ft 4 x 8 ft 4 x 8 ft  4 x 8 ft 4 x 8 ft  4 x 8 ft 4 x 8 ft --   Side stepping 4 x 8 ft 4 x 8 ft 4 x 8 ft 4 x 8 ft 4 x 8 ft  4 x 8 ft 4 x 8 ft  4 x 8 ft     Tandem Stance  2 x 30" 2 x 30" ea 2 x 30" ea 2 x 30" ea 2 x 30" ea  2 x 30" ea 2 x 30" ea 2 x 30" ea 2 x 30" ea --   SL balance  2 x 30" 2 x 30" ea 2 x 30" ea 2 x 30" ea 2 x 30" ea 2 x 30" ea 2 x 30" ea 2 x 30" ea 2 x 30" ea --   Step Ups L1 3 x 10 ea L1  2 x 10 ea L1  2 x 10 ea L1  2 x 10 ea L1 2 x 10 ea L1  1 x 15 ea L1 1 x " "15 ea L1  1 x 10 ea 1 x 10  --                                 Initials MA GWA 3/6 GWA 2/6 GWA 1/6 BJ GWA 1/6 BJ GWA 1/6 BJ BJ       Mariann participated in neuromuscular re-education activities to improve: Balance, Coordination and Proprioception for 10 minutes. The following activities were included: the above activities beneath "STANDING"    Mariann participated in gait training to improve functional mobility and safety for 6 minutes, including: NOT TODAY gait training with no AD with PT using SBA for 1 x 193 ft.        Lumbar Range of Motion:     Degrees Pain   Flexion 30    pain         Extension 5    Pain          Left Side Bending 15  Pain          Right Side Bending 15 Pain             Lower Extremity Strength  Right LE   Left LE     Knee extension: 4+/5 Knee extension: 4+/5   Knee flexion: 4/5* Knee flexion: 4/5*   Hip flexion: 4/5 Hip flexion: 4/5   Hip extension:  4-/5* Hip extension: 4-/5*   Hip abduction: 4/5* Hip abduction: 4/5*   Hip adduction: 4/5 Hip adduction 4-/5   Ankle dorsiflexion: 3+/5 Ankle dorsiflexion: 4/5          SL Balance: even surface R: 3 seconds; L = 2 seconds       Home Exercises Provided and Patient Education Provided     Education provided:   - continue balance training as tolerated   - resting after a long weekend     Written Home Exercises Provided: yes.  Exercises were reviewed and Mariann was able to demonstrate them prior to the end of the session.  Mariann demonstrated good  understanding of the education provided.     See EMR under Patient Instructions for exercises provided 8/19/2019.    Assessment     Mariann is noted to have improving LROM, LE strength, but continues to fatigue quickly and has decreased standing balance.  She will require continued progression of LE and core strengthening exercises as well as balance training in order to improve functional mobility,  Safety, and decrease fall risk.  Due to objective findings, subjective reports, patient is placed in " 60-80% limited and impaired category.      Mariann is progressing well towards her goals.   Pt prognosis is Good.     Pt will continue to benefit from skilled outpatient physical therapy to address the deficits listed in the problem list box on initial evaluation, provide pt/family education and to maximize pt's level of independence in the home and community environment.     Pt's spiritual, cultural and educational needs considered and pt agreeable to plan of care and goals.     Anticipated barriers to physical therapy: none     Goals:     Short Term Goals: 4 weeks   Patient will be able to ambulate for 30 minutes with SPC  on even and uneven with min-no pain/difficulty in order to grocery shop. IN PROGRESS  Patient will be able to perform sit to stand transfer without UE support with min-no pain/difficulty. IN PROGRESS  Patient will be able to perform bed mobility with min-no pain/difficulty. IN PROGRESS  Increase SL balance to 10 seconds (B) with minimal LOB in order to reduce risk of falls and increase global stability. IN PROGRESS    Long Term Goals: 8 weeks   Patient will be able to ambulate for 1 hour with SPC on even and uneven with min-no pain/difficulty in order to grocery shop. IN PROGRESS  Patient will be able to sit for 2 hours in order to watch movie with family. IN PROGRESS2  Patient will be able to transfer in and out of the tub SBA in order to bath independently. IN PROGRESS    Plan     Continue with Plan Of Care.       Florin Perdomo, PT

## 2019-09-30 NOTE — PROGRESS NOTES
Subjective:       Patient ID: Mariann Huff is a 59 y.o. female.    Chief Complaint: RECTAL ULCER    HPI  58 yo F with an extensive past medical history is a multiple comorbidities as noted below.  She was admitted to the hospital in April due to sepsis and complications from back surgery complicated by intraoperative ureteral injury requiring repair.  She developed DVTs during her hospitalization and was anticoagulated following which she began have rectal bleeding.  Flexible sigmoidoscopy done by Dr. Amin on 5/13/2019 showed a rectal ulcer which was not actively bleeding that time.   She subsequent developed recurrent bleeding in and repeat colonoscopy done on 05/24/2019 by Dr. Alaniz  showed localized moderate inflammation in the rectum.  Her bleeding eventually subsided.  She ultimately improved to the point where she could be discharged to rehab after a hospital stay that was more than a month in length.  She had an IVC filter placed for her DVTs and is no longer on anticoagulation for that, though she is on Plavix.  She was scheduled for follow-up with GI for the rectal ulceration seen on her scopes, but GI felt that a follow-up with them was not appropriate and that she should be seen by colorectal surgery instead.    She comes in today with multiple family members.  She offers no significant complaints.  She is having 1 loose bowel movement a day.   She is having no further rectal bleeding.  She denies any pain with bowel movements.      Review of patient's allergies indicates:   Allergen Reactions    Milk containing products      Causes GI distress       Past Medical History:   Diagnosis Date    Anticoagulant long-term use     Asthma     Back pain     Bradycardia, unspecified 5/8/2019    The etiology of the bradycardic episode is unclear.  The have appear to be respiratory in origin (at least the 1st episode).  We will continue to monitor carefully.  We are awaiting evaluation by Cardiology.       CAD (coronary artery disease)     s/p stentimg  (2), (1)    Carotid artery stenosis     Chronic combined systolic and diastolic CHF (congestive heart failure) 2019    Diabetes mellitus type 2 in obese     HTN (hypertension), benign     Hyperlipidemia     Keloid cicatrix     NPDR (nonproliferative diabetic retinopathy) 2015    NSTEMI (non-ST elevated myocardial infarction)     Nuclear sclerosis - Right Eye 3/18/2014    Primary localized osteoarthrosis, lower leg 2014    Sleep apnea        Past Surgical History:   Procedure Laterality Date    BRONCHOSCOPY N/A 2019    Procedure: BRONCHOSCOPY;  Surgeon: Sean Ruano MD;  Location: [facility] OR [address] [place] [address];  Service: General;  Laterality: N/A;    CARDIAC CATHETERIZATION      cataract extraction left eye      cataracts      CAUDAL EPIDURAL STEROID INJECTION N/A 2019    Procedure: Injection-steroid-epidural-caudal;  Surgeon: Dave Bentley Jr., MD;  Location: [facility];  Service: Pain Management;  Laterality: N/A;     SECTION, LOW TRANSVERSE      COLONOSCOPY N/A 2019    Procedure: COLONOSCOPY;  Surgeon: Al Alaniz MD;  Location: [facility] ([address] [place] [address]);  Service: Endoscopy;  Laterality: N/A;    CORONARY ANGIOPLASTY      ESOPHAGOGASTRODUODENOSCOPY W/ PEG  2019    Procedure: EGD, WITH PEG TUBE INSERTION;  Surgeon: Sean Ruano MD;  Location: [facility] OR [address] [place] [address];  Service: General;;    EXCISION TURBINATE, SUBMUCOUS      FLEXIBLE SIGMOIDOSCOPY N/A 2019    Procedure: SIGMOIDOSCOPY, FLEXIBLE;  Surgeon: ALBERTO Amin MD;  Location: [address]);  Service: Endoscopy;  Laterality: N/A;    FLEXIBLE SIGMOIDOSCOPY N/A 2019    Procedure: SIGMOIDOSCOPY, FLEXIBLE;  Surgeon: ALBERTO Amin MD;  Location: [facility] ENDO ([address] [place] [address]);  Service: Endoscopy;  Laterality: N/A;    FUSION OF LUMBAR SPINE BY ANTERIOR APPROACH Left 2019    Procedure: FUSION, SPINE, LUMBAR, ANTERIOR APPROACH Left L5-S1  Anterior to Psoa Interbody Fusion, L5-S1 Posterior Instrumentation;  Surgeon: Mk George MD;  Location: [facility] OR [facility];  Service: Neurosurgery;  Laterality: Left;  Porcedure:  Left L5-S1 Anterior to Psoa Interbody Fusion, L5-S1 Posterior Instrumentation  Surgery Time: 4 Hrs  LOS: 2-3  Anesthesia: General   Blood: Type & Screen  R    HAND SURGERY Left     HAND SURGERY Right     torn ligament repair/ Dr. Yeboah    HYSTERECTOMY      left foot surgery      left wrist surgery      NASAL SEPTUM SURGERY  5/7/15    PERCUTANEOUS NEPHROSTOMY Left 4/21/2019    Procedure: Creation, Nephrostomy, Percutaneous;  Surgeon: Karina Surgeon;  Location: [facility];  Service: Anesthesiology;  Laterality: Left;    REPAIR OF URETER  4/12/2019    Procedure: REPAIR, URETER;  Surgeon: Mk George MD;  Location: [facility] OR [address] [place] [address];  Service: Neurosurgery;;    REPLACEMENT OF NEPHROSTOMY TUBE N/A 7/18/2019    Procedure: REPLACEMENT, NEPHROSTOMY TUBE;  Surgeon: [facility];  [facility];  Service: Anesthesiology;  Laterality: N/A;  188    REPLACEMENT OF NEPHROSTOMY TUBE N/A 7/24/2019    Procedure: REPLACEMENT, NEPHROSTOMY TUBE;  Surgeon: [facility] Diagnostic Provider;  [facility];  Service: Anesthesiology;  Laterality: N/A;  188    rt elbow surgery      S/P LAD COATED STENT  05/14/2010    6 total     S/P OM1 STENT  08/2003    SINUS SURGERY      F.E.S.S.    TRACHEOSTOMY N/A 5/2/2019    Procedure: CREATION, TRACHEOSTOMY;  Surgeon: Sean Ruano MD;  [address]  Service: General;  Laterality: N/A;    TUBAL LIGATION         Current Outpatient Medications   Medication Sig Dispense Refill    ACCU-CHEK EDIN PLUS METER Misc       albuterol (PROVENTIL/VENTOLIN HFA) 90 mcg/actuation inhaler Inhale 2 puffs into the lungs every 6 (six) hours as needed for Wheezing. 1 each 11    albuterol-ipratropium (DUO-NEB) 2.5 mg-0.5 mg/3 mL nebulizer solution Inhale 3 mLs into the  lungs.      amLODIPine (NORVASC) 10 MG tablet 10 mg by Tube route.      aspirin 81 mg Tab Take 81 mg by mouth. 1 Tablet Oral Every day      atorvastatin (LIPITOR) 80 MG tablet 1 tablet (80 mg total) by Per G Tube route once daily. 90 tablet 3    blood sugar diagnostic (ACCU-CHEK EDIN PLUS TEST STRP) Strp TEST BLOOD SUGARS 4 TIMES DAILY 150 strip 11    clopidogrel (PLAVIX) 75 mg tablet Take 1 tablet (75 mg total) by mouth once daily. 30 tablet 3    escitalopram oxalate (LEXAPRO) 10 MG tablet Take 1 tablet (10 mg total) by mouth once daily. 30 tablet 11    famotidine (PEPCID) 20 MG tablet Take 1 tablet (20 mg total) by mouth 2 (two) times daily. 60 tablet 11    furosemide (LASIX) 40 MG tablet Take 1 tablet (40 mg total) by mouth once daily. 30 tablet 11    gabapentin (NEURONTIN) 300 MG capsule Take 1 capsule (300 mg total) by mouth 2 (two) times daily. 60 capsule 11    HYDROcodone-acetaminophen (NORCO) 5-325 mg per tablet Take 1 tablet by mouth every 6 (six) hours as needed for Pain. 28 tablet 0    insulin aspart U-100 (NOVOLOG) 100 unit/mL (3 mL) InPn pen Inject 1-10 Units into the skin 4 (four) times daily before meals and nightly. 12 mL 11    losartan (COZAAR) 50 MG tablet Take 1 tablet (50 mg total) by mouth once daily. 90 tablet 3    metoprolol tartrate (LOPRESSOR) 25 MG tablet Take 1 tablet (25 mg total) by mouth 2 (two) times daily. 60 tablet 11    multivitamin (THERAGRAN) per tablet Take 1 tablet by mouth once daily.      nitroGLYCERIN (NITROSTAT) 0.4 MG SL tablet Take one every 2-3 min till chestpain relief for 3 times and if still no relief, call MD or come to ED 30 tablet 11    ondansetron (ZOFRAN-ODT) 8 MG TbDL Take 1 tablet (8 mg total) by mouth every 12 (twelve) hours as needed. 30 tablet 2    rifAMpin (RIFADIN) 300 MG capsule       potassium chloride SA (K-DUR,KLOR-CON) 20 MEQ tablet Take 1 tablet (20 mEq total) by mouth 2 (two) times daily. 60 tablet 11     Current  Facility-Administered Medications   Medication Dose Route Frequency Provider Last Rate Last Dose    ciprofloxacin HCl tablet 500 mg  500 mg Oral Once Robin Boyd MD        lidocaine HCl 2% urojet   Urethral Once Robin Boyd MD         Facility-Administered Medications Ordered in Other Visits   Medication Dose Route Frequency Provider Last Rate Last Dose    lidocaine (PF) 10 mg/ml (1%) injection 10 mg  1 mL Intradermal Once Dave Bentley Jr., MD           Family History   Problem Relation Age of Onset    Diabetes Mother     Heart disease Mother     Diabetes Father     Leukemia Father         leukemia    Heart attack Father     Diabetes Sister     Diabetes Brother     Diabetes Sister     No Known Problems Sister     No Known Problems Brother     No Known Problems Brother     No Known Problems Maternal Grandmother     No Known Problems Maternal Grandfather     No Known Problems Paternal Grandmother     No Known Problems Paternal Grandfather     No Known Problems Son     No Known Problems Son     No Known Problems Maternal Aunt     No Known Problems Maternal Uncle     No Known Problems Paternal Aunt     No Known Problems Paternal Uncle     Colon cancer Neg Hx     Inflammatory bowel disease Neg Hx     Melanoma Neg Hx     Psoriasis Neg Hx     Lupus Neg Hx     Eczema Neg Hx     Acne Neg Hx     Amblyopia Neg Hx     Blindness Neg Hx     Cancer Neg Hx     Cataracts Neg Hx     Glaucoma Neg Hx     Hypertension Neg Hx     Macular degeneration Neg Hx     Retinal detachment Neg Hx     Strabismus Neg Hx     Stroke Neg Hx     Thyroid disease Neg Hx     Heart failure Neg Hx     Hyperlipidemia Neg Hx        Social History     Socioeconomic History    Marital status:      Spouse name: Shamir    Number of children: 2    Years of education: Not on file    Highest education level: Not on file   Occupational History    Occupation: cafeteria     Employer: [employer]     Employer: [employer]     Employer: [employer]   Social Needs    Financial resource strain: Not on file    Food insecurity:     Worry: Not on file     Inability: Not on file    Transportation needs:     Medical: Not on file     Non-medical: Not on file   Tobacco Use    Smoking status: Never Smoker    Smokeless tobacco: Never Used   Substance and Sexual Activity    Alcohol use: No     Alcohol/week: 0.0 standard drinks    Drug use: No    Sexual activity: Yes     Partners: Male     Birth control/protection: Post-menopausal     Comment:    Lifestyle    Physical activity:     Days per week: Not on file     Minutes per session: Not on file    Stress: Not on file   Relationships    Social connections:     Talks on phone: Not on file     Gets together: Not on file     Attends [employer] service: Not on file     Active member of club or organization: Not on file     Attends meetings of clubs or organizations: Not on file     Relationship status: Not on file   Other Topics Concern    Are you pregnant or think you may be? No    Breast-feeding No   Social History Narrative    . 2 children.        Review of Systems   Constitutional: Negative for chills and fever.   HENT: Negative for congestion and sore throat.    Eyes: Negative for visual disturbance.   Respiratory: Negative for cough and shortness of breath.    Cardiovascular: Negative for chest pain and palpitations.   Gastrointestinal: Positive for diarrhea. Negative for abdominal distention, abdominal pain, anal bleeding, blood in stool, constipation, nausea, rectal pain and vomiting.   Endocrine: Negative for cold intolerance and heat intolerance.   Genitourinary: Negative for dysuria and frequency.   Musculoskeletal: Positive for back pain. Negative for arthralgias and neck pain.   Skin: Negative for rash.   Allergic/Immunologic: Negative for immunocompromised state.   Neurological: Negative for dizziness,  light-headedness and headaches.   Hematological: Does not bruise/bleed easily.   Psychiatric/Behavioral: Negative for confusion. The patient is not nervous/anxious.        Objective:      Physical Exam   Constitutional: She is oriented to person, place, and time. She appears well-developed and well-nourished.   HENT:   Head: Normocephalic.   Pulmonary/Chest: Effort normal. No respiratory distress.   Abdominal: Soft. Bowel sounds are normal. She exhibits no distension and no mass. There is no tenderness. There is no rebound and no guarding.   Musculoskeletal: Normal range of motion.   Neurological: She is alert and oriented to person, place, and time.   Skin: Skin is warm and dry.   Psychiatric: She has a normal mood and affect.         Lab Results   Component Value Date    WBC 7.23 07/12/2019    HGB 10.3 (L) 07/12/2019    HCT 34.0 (L) 07/12/2019    MCV 90 07/12/2019     07/12/2019     BMP  Lab Results   Component Value Date     09/20/2019    K 3.2 (L) 09/20/2019     09/20/2019    CO2 31 (H) 09/20/2019    BUN 14 09/20/2019    CREATININE 0.8 09/20/2019    CALCIUM 9.8 09/20/2019    ANIONGAP 10 09/20/2019    ESTGFRAFRICA >60.0 09/20/2019    EGFRNONAA >60.0 09/20/2019     CMP  Sodium   Date Value Ref Range Status   09/20/2019 144 136 - 145 mmol/L Final     Potassium   Date Value Ref Range Status   09/20/2019 3.2 (L) 3.5 - 5.1 mmol/L Final     Chloride   Date Value Ref Range Status   09/20/2019 103 95 - 110 mmol/L Final     CO2   Date Value Ref Range Status   09/20/2019 31 (H) 23 - 29 mmol/L Final     Glucose   Date Value Ref Range Status   09/20/2019 207 (H) 70 - 110 mg/dL Final     BUN, Bld   Date Value Ref Range Status   09/20/2019 14 6 - 20 mg/dL Final     Creatinine   Date Value Ref Range Status   09/20/2019 0.8 0.5 - 1.4 mg/dL Final     Calcium   Date Value Ref Range Status   09/20/2019 9.8 8.7 - 10.5 mg/dL Final     Total Protein   Date Value Ref Range Status   09/20/2019 7.5 6.0 - 8.4 g/dL Final      Albumin   Date Value Ref Range Status   09/20/2019 3.2 (L) 3.5 - 5.2 g/dL Final     Total Bilirubin   Date Value Ref Range Status   09/20/2019 0.6 0.1 - 1.0 mg/dL Final     Comment:     For infants and newborns, interpretation of results should be based  on gestational age, weight and in agreement with clinical  observations.  Premature Infant recommended reference ranges:  Up to 24 hours.............<8.0 mg/dL  Up to 48 hours............<12.0 mg/dL  3-5 days..................<15.0 mg/dL  6-29 days.................<15.0 mg/dL       Alkaline Phosphatase   Date Value Ref Range Status   09/20/2019 112 55 - 135 U/L Final     AST   Date Value Ref Range Status   09/20/2019 14 10 - 40 U/L Final     ALT   Date Value Ref Range Status   09/20/2019 13 10 - 44 U/L Final     Anion Gap   Date Value Ref Range Status   09/20/2019 10 8 - 16 mmol/L Final     eGFR if    Date Value Ref Range Status   09/20/2019 >60.0 >60 mL/min/1.73 m^2 Final     eGFR if non    Date Value Ref Range Status   09/20/2019 >60.0 >60 mL/min/1.73 m^2 Final     Comment:     Calculation used to obtain the estimated glomerular filtration  rate (eGFR) is the CKD-EPI equation.        No results found for: CEA        Assessment:       1. Rectal ulcer        Plan:   She no longer is having any rectal bleeding, but I would like to get a sense of what the rectal ulcer looks like at this point.  She is agreeable to undergoing a flexible sigmoidoscopy in the office but does not want to have it done today - she would rather schedule this to be done at a later date.  She will be scheduled for a follow-up office visit with me in the coming weeks so that I could perform a flexible sigmoidoscopy to see with the ulcer looks like at this point.    Som Becerra MD, FACS, FASCRS  Senior Staff Surgeon  Department of Colon & Rectal Surgery     This note was created using voice recognition software, and may contain some unrecognized  transcriptional errors.

## 2019-10-04 ENCOUNTER — TELEPHONE (OUTPATIENT)
Dept: INTERVENTIONAL RADIOLOGY/VASCULAR | Facility: HOSPITAL | Age: 59
End: 2019-10-04

## 2019-10-04 DIAGNOSIS — Z93.6 NEPHROSTOMY STATUS: Primary | ICD-10-CM

## 2019-10-04 DIAGNOSIS — Z95.828 PRESENCE OF IVC FILTER: ICD-10-CM

## 2019-10-04 RX ORDER — MIDAZOLAM HYDROCHLORIDE 1 MG/ML
1 INJECTION INTRAMUSCULAR; INTRAVENOUS
Status: CANCELLED | OUTPATIENT
Start: 2019-10-04

## 2019-10-04 RX ORDER — FENTANYL CITRATE 50 UG/ML
50 INJECTION, SOLUTION INTRAMUSCULAR; INTRAVENOUS
Status: CANCELLED | OUTPATIENT
Start: 2019-10-04

## 2019-10-07 ENCOUNTER — HOSPITAL ENCOUNTER (OUTPATIENT)
Facility: HOSPITAL | Age: 59
Discharge: HOME | End: 2019-10-07
Attending: UROLOGY | Admitting: UROLOGY
Payer: MEDICARE

## 2019-10-07 VITALS
HEART RATE: 72 BPM | RESPIRATION RATE: 16 BRPM | SYSTOLIC BLOOD PRESSURE: 141 MMHG | OXYGEN SATURATION: 100 % | WEIGHT: 138 LBS | HEIGHT: 64 IN | DIASTOLIC BLOOD PRESSURE: 55 MMHG | TEMPERATURE: 98 F | BODY MASS INDEX: 23.56 KG/M2

## 2019-10-07 DIAGNOSIS — Z93.6 NEPHROSTOMY STATUS: ICD-10-CM

## 2019-10-07 DIAGNOSIS — Z95.828 PRESENCE OF IVC FILTER: ICD-10-CM

## 2019-10-07 DIAGNOSIS — I82.499 DEEP VEIN THROMBOSIS (DVT) OF OTHER VEIN OF LOWER EXTREMITY, UNSPECIFIED CHRONICITY, UNSPECIFIED LATERALITY: ICD-10-CM

## 2019-10-07 DIAGNOSIS — R60.9 EDEMA, UNSPECIFIED TYPE: ICD-10-CM

## 2019-10-07 DIAGNOSIS — N13.30 HYDRONEPHROSIS, UNSPECIFIED HYDRONEPHROSIS TYPE: Primary | ICD-10-CM

## 2019-10-07 DIAGNOSIS — N13.30 HYDRONEPHROSIS: ICD-10-CM

## 2019-10-07 LAB — POCT GLUCOSE: 141 MG/DL (ref 70–110)

## 2019-10-07 PROCEDURE — 63600175 PHARM REV CODE 636 W HCPCS: Performed by: FAMILY MEDICINE

## 2019-10-07 PROCEDURE — 63600175 PHARM REV CODE 636 W HCPCS: Performed by: RADIOLOGY

## 2019-10-07 PROCEDURE — 63600175 PHARM REV CODE 636 W HCPCS: Performed by: STUDENT IN AN ORGANIZED HEALTH CARE EDUCATION/TRAINING PROGRAM

## 2019-10-07 PROCEDURE — 82962 GLUCOSE BLOOD TEST: CPT

## 2019-10-07 RX ORDER — HYDRALAZINE HYDROCHLORIDE 20 MG/ML
10 INJECTION INTRAMUSCULAR; INTRAVENOUS ONCE
Status: COMPLETED | OUTPATIENT
Start: 2019-10-07 | End: 2019-10-07

## 2019-10-07 RX ORDER — SODIUM CHLORIDE 9 MG/ML
500 INJECTION, SOLUTION INTRAVENOUS ONCE
Status: COMPLETED | OUTPATIENT
Start: 2019-10-07 | End: 2019-10-07

## 2019-10-07 RX ORDER — FENTANYL CITRATE 50 UG/ML
INJECTION, SOLUTION INTRAMUSCULAR; INTRAVENOUS CODE/TRAUMA/SEDATION MEDICATION
Status: COMPLETED | OUTPATIENT
Start: 2019-10-07 | End: 2019-10-07

## 2019-10-07 RX ORDER — MIDAZOLAM HYDROCHLORIDE 1 MG/ML
INJECTION INTRAMUSCULAR; INTRAVENOUS CODE/TRAUMA/SEDATION MEDICATION
Status: COMPLETED | OUTPATIENT
Start: 2019-10-07 | End: 2019-10-07

## 2019-10-07 RX ADMIN — MIDAZOLAM HYDROCHLORIDE 1 MG: 1 INJECTION, SOLUTION INTRAMUSCULAR; INTRAVENOUS at 02:10

## 2019-10-07 RX ADMIN — HYDRALAZINE HYDROCHLORIDE 10 MG: 20 INJECTION INTRAMUSCULAR; INTRAVENOUS at 11:10

## 2019-10-07 RX ADMIN — FENTANYL CITRATE 50 MCG: 50 INJECTION, SOLUTION INTRAMUSCULAR; INTRAVENOUS at 02:10

## 2019-10-07 RX ADMIN — SODIUM CHLORIDE 500 ML: 0.9 INJECTION, SOLUTION INTRAVENOUS at 11:10

## 2019-10-07 RX ADMIN — CEFTRIAXONE 2 G: 2 INJECTION, SOLUTION INTRAVENOUS at 01:10

## 2019-10-07 RX ADMIN — HYDRALAZINE HYDROCHLORIDE 10 MG: 20 INJECTION INTRAMUSCULAR; INTRAVENOUS at 12:10

## 2019-10-07 RX ADMIN — FENTANYL CITRATE 25 MCG: 50 INJECTION, SOLUTION INTRAMUSCULAR; INTRAVENOUS at 02:10

## 2019-10-07 NOTE — PROGRESS NOTES
Notified Mouna in IR of pt's most recent BP following 2nd dose of hydralazine.  Per RN, they will be transporting pt to IR soon.

## 2019-10-07 NOTE — PROGRESS NOTES
IR notified of pt's BP, see flowsheet.  RN instructed to hold off on IV placement until further instructions.

## 2019-10-07 NOTE — H&P
Radiology History & Physical      SUBJECTIVE:     Chief Complaint: Nephrostomy tube exchange and  IVC filter removal.    History of Present Illness:  Mariann Huff is a 59 y.o. female with extensive past medical history is a multiple comorbidities as noted below.  She was admitted to the hospital in April due to sepsis and complications from back surgery complicated by intraoperative ureteral injury requiring repair.  She developed DVTs during her hospitalization and was anticoagulated following which she began have rectal bleeding and thus an IVC filter was placed.    Plan today for IVC filter removal and nephrostomy tube check.    Of note, pt was hypertensive to SBP of 209.  Pt stated she took her am doses of HTN medications, required 20mg hydralazine IV for improved control.      Past Medical History:   Diagnosis Date    Anticoagulant long-term use     Asthma     Back pain     Bradycardia, unspecified 5/8/2019    The etiology of the bradycardic episode is unclear.  The have appear to be respiratory in origin (at least the 1st episode).  We will continue to monitor carefully.  We are awaiting evaluation by Cardiology.      CAD (coronary artery disease)     s/p stentimg 2003 (2),2009 (1)    Carotid artery stenosis     Chronic combined systolic and diastolic CHF (congestive heart failure) 7/2/2019    Diabetes mellitus type 2 in obese     HTN (hypertension), benign     Hyperlipidemia     Keloid cicatrix     NPDR (nonproliferative diabetic retinopathy) 8/17/2015    NSTEMI (non-ST elevated myocardial infarction)     Nuclear sclerosis - Right Eye 3/18/2014    Primary localized osteoarthrosis, lower leg 6/18/2014    Sleep apnea      Past Surgical History:   Procedure Laterality Date    BRONCHOSCOPY N/A 5/2/2019    Procedure: BRONCHOSCOPY;  Surgeon: Sean Ruano MD;  Location: [facility] OR [address] [place] [address];  Service: General;  Laterality: N/A;    CARDIAC CATHETERIZATION      cataract extraction left eye       cataracts      CAUDAL EPIDURAL STEROID INJECTION N/A 2019    Procedure: Injection-steroid-epidural-caudal;  Surgeon: Dave Bentley Jr., MD;  Location: [facility] PAIN MGT;  Service: Pain Management;  Laterality: N/A;     SECTION, LOW TRANSVERSE      COLONOSCOPY N/A 2019    Procedure: COLONOSCOPY;  Surgeon: Al Alaniz MD;  [facility]  Service: Endoscopy;  Laterality: N/A;    CORONARY ANGIOPLASTY      ESOPHAGOGASTRODUODENOSCOPY W/ PEG  2019    Procedure: EGD, WITH PEG TUBE INSERTION;  Surgeon: Sean Ruano MD;  Location: [facility] OR [address] [place] [address];  Service: General;;    EXCISION TURBINATE, SUBMUCOUS      FLEXIBLE SIGMOIDOSCOPY N/A 2019    Procedure: SIGMOIDOSCOPY, FLEXIBLE;  Surgeon: ALBERTO Amin MD;  [facility]  Service: Endoscopy;  Laterality: N/A;    FLEXIBLE SIGMOIDOSCOPY N/A 2019    Procedure: SIGMOIDOSCOPY, FLEXIBLE;  Surgeon: ALBERTO Amin MD;  Location: [facility] ([address] [place] [address]);  Service: Endoscopy;  Laterality: N/A;    FUSION OF LUMBAR SPINE BY ANTERIOR APPROACH Left 2019    Procedure: FUSION, SPINE, LUMBAR, ANTERIOR APPROACH Left L5-S1 Anterior to Psoa Interbody Fusion, L5-S1 Posterior Instrumentation;  Surgeon: Mk George MD;  Location: [address];  Service: Neurosurgery;  Laterality: Left;  Porcedure:  Left L5-S1 Anterior to Psoa Interbody Fusion, L5-S1 Posterior Instrumentation  Surgery Time: 4 Hrs  LOS: 2-3  Anesthesia: General   Blood: Type & Screen  R    HAND SURGERY Left     HAND SURGERY Right     torn ligament repair/ Dr. Yeboah    HYSTERECTOMY      left foot surgery      left wrist surgery      NASAL SEPTUM SURGERY  5/7/15    PERCUTANEOUS NEPHROSTOMY Left 2019    Procedure: Creation, Nephrostomy, Percutaneous;  Surgeon: Karina Surgeon;  Location: [facility];  Service: Anesthesiology;  Laterality: Left;    REPAIR OF URETER  2019    Procedure: REPAIR, URETER;  Surgeon: Mk LUX  MD Ariel;  [facility];  Service: Neurosurgery;;    REPLACEMENT OF NEPHROSTOMY TUBE N/A 7/18/2019    Procedure: REPLACEMENT, NEPHROSTOMY TUBE;  Surgeon: [facility] Diagnostic Provider;  [facility];  Service: Anesthesiology;  Laterality: N/A;  188    REPLACEMENT OF NEPHROSTOMY TUBE N/A 7/24/2019    Procedure: REPLACEMENT, NEPHROSTOMY TUBE;  Surgeon: [facility] Diagnostic Provider;  [facility];  Service: Anesthesiology;  Laterality: N/A;  188    rt elbow surgery      S/P LAD COATED STENT  05/14/2010    6 total     S/P OM1 STENT  08/2003    SINUS SURGERY      F.E[place]S.S.    TRACHEOSTOMY N/A 5/2/2019    Procedure: CREATION, TRACHEOSTOMY;  Surgeon: Sean Ruano MD;  [facility];  Service: General;  Laterality: N/A;    TUBAL LIGATION         Home Meds:   Prior to Admission medications    Medication Sig Start Date End Date Taking? Authorizing Provider   amLODIPine (NORVASC) 10 MG tablet 10 mg by Tube route.   Yes Historical Provider, MD   aspirin 81 mg Tab Take 81 mg by mouth. 1 Tablet Oral Every day   Yes Historical Provider, MD   atorvastatin (LIPITOR) 80 MG tablet 1 tablet (80 mg total) by Per G Tube route once daily. 7/3/19 7/2/20 Yes Kacey Avila NP   clopidogrel (PLAVIX) 75 mg tablet Take 1 tablet (75 mg total) by mouth once daily. 7/3/19 7/2/20 Yes Kacey Avila NP   escitalopram oxalate (LEXAPRO) 10 MG tablet Take 1 tablet (10 mg total) by mouth once daily. 7/3/19 7/2/20 Yes Kacey Avila NP   famotidine (PEPCID) 20 MG tablet Take 1 tablet (20 mg total) by mouth 2 (two) times daily. 7/2/19 7/1/20 Yes Kacey Avila NP   furosemide (LASIX) 40 MG tablet Take 1 tablet (40 mg total) by mouth once daily. 7/3/19 7/2/20 Yes Kacey Avila NP   HYDROcodone-acetaminophen (NORCO) 5-325 mg per tablet Take 1 tablet by mouth every 6 (six) hours as needed for Pain. 7/2/19  Yes Kacey Avila, NP   losartan (COZAAR) 50 MG tablet Take 1 tablet (50  mg total) by mouth once daily. 7/3/19 7/2/20 Yes Kacey Avila NP   metoprolol tartrate (LOPRESSOR) 25 MG tablet Take 1 tablet (25 mg total) by mouth 2 (two) times daily. 7/2/19 7/1/20 Yes Kacey Avila NP   multivitamin (THERAGRAN) per tablet Take 1 tablet by mouth once daily. 7/3/19  Yes Kacey Avila NP   potassium chloride SA (K-DUR,KLOR-CON) 20 MEQ tablet Take 1 tablet (20 mEq total) by mouth 2 (two) times daily. 9/20/19  Yes Robin Boyd MD   ACCU-CHEK EDIN PLUS METER Misc  9/23/14   Historical Provider, MD   albuterol (PROVENTIL/VENTOLIN HFA) 90 mcg/actuation inhaler Inhale 2 puffs into the lungs every 6 (six) hours as needed for Wheezing. 11/30/18   Jasbir Haney MD   albuterol-ipratropium (DUO-NEB) 2.5 mg-0.5 mg/3 mL nebulizer solution Inhale 3 mLs into the lungs.    Historical Provider, MD   blood sugar diagnostic (ACCU-CHEK EDIN PLUS TEST STRP) Strp TEST BLOOD SUGARS 4 TIMES DAILY 8/31/15   Rosmery Fisher NP   gabapentin (NEURONTIN) 300 MG capsule Take 1 capsule (300 mg total) by mouth 2 (two) times daily. 7/22/19 7/21/20  kM George MD   insulin aspart U-100 (NOVOLOG) 100 unit/mL (3 mL) InPn pen Inject 1-10 Units into the skin 4 (four) times daily before meals and nightly. 7/2/19 7/1/20  Kacey Avila NP   nitroGLYCERIN (NITROSTAT) 0.4 MG SL tablet Take one every 2-3 min till chestpain relief for 3 times and if still no relief, call MD or come to ED 1/6/17   Jasbri Haney MD   ondansetron (ZOFRAN-ODT) 8 MG TbDL Take 1 tablet (8 mg total) by mouth every 12 (twelve) hours as needed. 7/15/19   Jasbir Haney MD   rifAMpin (RIFADIN) 300 MG capsule  9/9/19   Historical Provider, MD     Anticoagulants/Antiplatelets: clopidogrel    Allergies:   Review of patient's allergies indicates:   Allergen Reactions    Milk containing products      Causes GI distress     Sedation History:  no adverse reactions    Review of Systems:   Hematological: no known coagulopathies  Respiratory: no shortness of  breath  Cardiovascular: no chest pain  Gastrointestinal: no abdominal pain  Genito-Urinary: no dysuria  Musculoskeletal: negative  Neurological: no TIA or stroke symptoms         OBJECTIVE:     Vital Signs (Most Recent)  Temp: 97.6 °F (36.4 °C) (10/07/19 1056)  Pulse: 65 (10/07/19 1056)  Resp: 16 (10/07/19 1056)  BP: (!) 169/75 (10/07/19 1245)  SpO2: 100 % (10/07/19 1056)    Physical Exam:  ASA: 3  Mallampati: 2    General: no acute distress  Mental Status: alert and oriented to person, place and time  HEENT: normocephalic, atraumatic  Chest: unlabored breathing  Heart: regular heart rate  Abdomen: nondistended  Extremity: moves all extremities    Laboratory  Lab Results   Component Value Date    INR 1.0 10/07/2019       Lab Results   Component Value Date    WBC 6.43 10/07/2019    HGB 11.0 (L) 10/07/2019    HCT 35.8 (L) 10/07/2019    MCV 86 10/07/2019     10/07/2019      Lab Results   Component Value Date     (H) 09/20/2019     09/20/2019    K 3.2 (L) 09/20/2019     09/20/2019    CO2 31 (H) 09/20/2019    BUN 14 09/20/2019    CREATININE 0.8 09/20/2019    CALCIUM 9.8 09/20/2019    MG 1.4 (L) 07/01/2019    ALT 13 09/20/2019    AST 14 09/20/2019    ALBUMIN 3.2 (L) 09/20/2019    BILITOT 0.6 09/20/2019    BILIDIR 0.3 07/01/2019       ASSESSMENT/PLAN:     Sedation Plan: up to moderate    Patient will undergo nephrostomy tube exchange and removal of IVC filter.        Henry Santana MD, MS  Radiology  PGY-2  Pager: [phone]

## 2019-10-07 NOTE — PROGRESS NOTES
Procedure complete. Pt tolerated well. Incision site clean dry intact, no bleeding no hematoma. Nephrostomy tube changed and replaced with 8 Turkmen tube. Pt to recover in rocu, report will be given to nurse at bedside

## 2019-10-07 NOTE — DISCHARGE INSTRUCTIONS
Please call with any questions or concerns.      Monday thru Friday 8:00 am - 4:30 pm    Interventional Radiology   [phone]    After Hours    Ask for the Radiology Intern on call  [phone]

## 2019-10-07 NOTE — PROGRESS NOTES
Followed up with Jerilyn in IR regarding pt's BP and status of case.  Per MD, start IV and will put in orders for hydralazine.

## 2019-10-07 NOTE — DISCHARGE SUMMARY
Radiology Discharge Summary      Hospital Course: No complications    Admit Date: 10/7/2019  Discharge Date: 10/07/2019     Instructions Given to Patient: Yes  Diet: Resume prior diet  Activity: activity as tolerated and no driving for today    Description of Condition on Discharge: Stable  Vital Signs (Most Recent): Temp: 97.5 °F (36.4 °C) (10/07/19 1525)  Pulse: 67 (10/07/19 1525)  Resp: 16 (10/07/19 1525)  BP: (!) 135/54 (10/07/19 1525)  SpO2: 100 % (10/07/19 1525)    Discharge Disposition: Home    Discharge Diagnosis: Ureteral obstruction s/p IVC filter removal and left neph tube exchange. Follow up as scheduled.    David Lo M.D.  Diagnostic and Interventional Radiologist  Department of Radiology  Pager: [phone]

## 2019-10-07 NOTE — PROCEDURES
Radiology Post-Procedure Note    Pre Op Diagnosis: IVC filter, ureteral obstruction    Post Op Diagnosis: Same    Procedure: IVCF removal, left nephrostomy tube replacement.    Procedure performed by: Teresita ORTEGA, David    Written Informed Consent Obtained: Yes  Specimen Removed: YES IVCF  Estimated Blood Loss: Minimal    Findings:   Via rt IJ, IVCF removed without complications. Left nephrostomy exchanged without complications. Hemostasis achieved with manual compression.    Patient tolerated procedure well.    David Lo M.D.  Diagnostic and Interventional Radiologist  Department of Radiology  Pager: [phone]

## 2019-10-15 ENCOUNTER — CLINICAL SUPPORT (OUTPATIENT)
Dept: REHABILITATION | Facility: HOSPITAL | Age: 59
End: 2019-10-15
Attending: NEUROLOGICAL SURGERY
Payer: MEDICARE

## 2019-10-15 DIAGNOSIS — R26.89 POOR BALANCE: ICD-10-CM

## 2019-10-15 DIAGNOSIS — R26.89 GAIT, ANTALGIC: ICD-10-CM

## 2019-10-15 PROCEDURE — 97110 THERAPEUTIC EXERCISES: CPT | Mod: PO

## 2019-10-15 PROCEDURE — G8978 MOBILITY CURRENT STATUS: HCPCS | Mod: CK,PO

## 2019-10-15 PROCEDURE — G8980 MOBILITY D/C STATUS: HCPCS | Mod: CK,PO

## 2019-10-15 PROCEDURE — 97112 NEUROMUSCULAR REEDUCATION: CPT | Mod: PO

## 2019-10-15 NOTE — PROGRESS NOTES
Outpatient Therapy Discharge Summary     Name: Mariann Huff  Clinic Number: [id number]    Therapy Diagnosis:   Encounter Diagnoses   Name Primary?    Poor balance     Gait, antalgic      Physician: Mk George MD    Visit Date: 10/15/2019    Physician Orders: PT Eval and Treat   Medical Diagnosis from Referral: Z98.1 (ICD-10-CM) - S/P lumbar fusion  Evaluation Date: 8/19/2019  Authorization Period Expiration: 07/21/2020  Plan of Care Expiration: 10/19/19  Visit # / Visits authorized: 11/ 50    Date of Last visit: 10/15/19  Total Visits Received: 50  Cancelled Visits: 5  No Show Visits: 2    Time In: 4:00 pm  Time Out: 4:50 pm  Total Billable Time: 45 minutes    Precautions: Diabetes and Fall    Subjective     Pt reports: she feels that she has made 180 since coming to therapy. Patient states she is most impressed that she no longer needs an AD to walk   She was compliant with home exercise program.  Response to previous treatment: felt good   Functional change: walking more without the cane     Pain: 5/10  Location: bilateral back      Objective     Mariann received therapeutic exercises to develop strength, endurance, ROM, flexibility, posture and core stabilization for 35 minutes including:         Date  10/15/19 9/26/19 09/24/19 09/17/19 09/12/19 9/10/19 09/05/19 9/3/19 08/27/19 08/22/19 08/19/19   VISIT 11/50 10/50 9/50 8/50 7/50 6/50 5/50 4/50 3/50 2/50 1/50   G CODE VISIT 2/10  10/26/19 1/10  10/26/19 9/10 8/10 7/10 6/10 5/10 4/10 3/10 2/10 1/10   POC EXP. DATE 10/19 10/19/19 10/19/19 10/19/19 10/19/19 10/19/19 10/19/19 10/19/19 10/19/19 10/19/19 10/19/19   VISIT AMOUNT  MEDICARE TOTAL 95.11  956.74 125.43  861.63 64.79  736.20 64.79  671.41 121.28  606.62 60.64  485.34 64.79  424.70 60.64  359.91 125.43  299.27 60.64  173.84 113.20   FACE-TO-FACE 10/24/19 10/24/19 10/24/19 09/19/19 09/19/19 09/19/19 09/19/19 9/19/19 09/19/19 09/19/19 09/19/19                   TABLE:               HSS w/ strap 10  "x 10'' 10 x 10" 10 x 10" 10 x 10" 10 x 10" 10 x 10" 10 x 10" 10 x 10" 10 x 10" 10 x 10" --   Piriformis stretch 10 x 10'' 10 x 10" 10 x 10" 10 x 10" 10 x 10" 10 x 10" 10 x 10" 10 x 10" 10 x 10" 10 x 10" --   Hip flexor stretch 2 x 30'' 2 x 30" 2 x 30" 2 x 30" 2 x 30" 2 x 30" 2 x 30" 2 x 30" 2 x 30" 2 x 30" --   TA's  25 x 3" 25 x 3" 25 x 3" 25 x 3" 25 x 3" 20 x 5" 20 x 5" 15 x 5" 15 x 5" --   Bridge  3 x 10 w/ ball 3 x 10 3 x 10 3 x 10 3 x 10 3 x 10  3 x 10 2 x 10  2 x 10 1 x 15  --   LTR 3 x 10 3 x 10 3 x 10 2 x 10 2 x 10 2 x 10  2 x 10 2 x 10  1 x 15 1 x 15  --   Marching w/ TA 3 x 10 x 2# 3 x 10 x 2# 3 x 10 x 1# 3 x 10 x 1# 2 x 10 x 1# 2 x 10 x 1#  2 x 10 2 x 10  1 x 15 1 x 15 --   SAQ 3 x 10 x 2# 3 x 10 x 2# 3 x 10 x 1# 3 x 10 x 1# 2 x 10 x 1# 3 x 10  3 x 10 2 x 10  2 x 10 1 x 15 --   SLR 3 x 10 x 2# 3 x 10 x 2# 3 x 10 x 1# 3 x 10 x 1# 2 x 10 x 1# 2 x 10 x 1#  3 x 10 2 x 10  2 x 10 1 x 10 --   Hip abduction 3 x 10 BTB 3 x 10 BTB 3 x 10 GTB 3 x 10 GTB 3 x 10 GTB 3 x 10 RTB 3 x 10 RTB 2 x 10 RTB 2 x 10 RTB 1 x 15 RTB --   Hip adduction 30 x 5'' w/ ball 30 x 5" w/ball 30 x 5" w/ ball 30 x 5" w/ ball 30 x 5" w/ ball 30 x 5" w/ball 30 x 5" w/ ball 25 x 5"  20 x 5" w/ ball 15 x 5" w/ball  --                   SEATED:               LAQs 3 x 10 x 2# 3 x 10 x 2# 3 x 10 x 1# 3 x 10 x 1# 3 x 10 x 1# 2 x 10 x 1# 2 x 10 2 x 10 1 x 15 1 x 15 --   Seated Hip Flex 3 x 10 x 2# 3 x 10 x 2# 3 x 10 x 1# 3 x 10 x 1# 2 x 10 x 1# 2 x 10 x 1# 2 x 10 2 x 10  1 x 15 1 x 15 --   Sit to stand w/o UE 1 x 15 1 x 15 1 x 15 1 x 15 1 x 15 1 x 15 1 x 15 1 x 15  1 x 10 1 x 10  --                   STANDING:               Heel raises 3 x 10 3 x 10 3 x 10 3 x 10 3 x 10 3 x 10  2 x 10 2 x 10  1 x 15 1 x 10  --   Mini squats 2 x 10 2 x 10 1 x 15 1 x 15 1 x 15 1 x 15 1 x 15 1 x 15  1 x 15 1 x 10  --   Tandem Walking  -- 4 x 8 ft 4 x 8 ft 4 x 8 ft 4 x 8 ft 4 x 8 ft  4 x 8 ft 4 x 8 ft  4 x 8 ft 4 x 8 ft --   Side stepping -- 4 x 8 ft 4 x 8 ft 4 x 8 ft 4 " "x 8 ft 4 x 8 ft  4 x 8 ft 4 x 8 ft  4 x 8 ft     Tandem Stance  2 x 30'' 2 x 30" 2 x 30" ea 2 x 30" ea 2 x 30" ea 2 x 30" ea  2 x 30" ea 2 x 30" ea 2 x 30" ea 2 x 30" ea --   SL balance  2 x 30'' 2 x 30" 2 x 30" ea 2 x 30" ea 2 x 30" ea 2 x 30" ea 2 x 30" ea 2 x 30" ea 2 x 30" ea 2 x 30" ea --   Step Ups NT L1 3 x 10 ea L1  2 x 10 ea L1  2 x 10 ea L1  2 x 10 ea L1 2 x 10 ea L1  1 x 15 ea L1 1 x 15 ea L1  1 x 10 ea 1 x 10  --                                   Initials BJ MA GWA 3/6 GWA 2/6 GWA 1/6 BJ GWA 1/6 BJ GWA 1/6 BJ BJ       Mariann participated in neuromuscular re-education activities to improve: Balance, Coordination and Proprioception for 10 minutes. The following activities were included: the above activities beneath "STANDING"    Mariann participated in gait training to improve functional mobility and safety for 6 minutes, including: NOT TODAY gait training with no AD with PT using SBA for 1 x 193 ft.        Lumbar Range of Motion:     Degrees Pain   Flexion 30    pain         Extension 5    Pain          Left Side Bending 15  Pain          Right Side Bending 15 Pain             Lower Extremity Strength  Right LE   Left LE     Knee extension: 4+/5 Knee extension: 4+/5   Knee flexion: 4/5* Knee flexion: 4/5*   Hip flexion: 4/5 Hip flexion: 4/5   Hip extension:  4-/5* Hip extension: 4-/5*   Hip abduction: 4/5* Hip abduction: 4/5*   Hip adduction: 4/5 Hip adduction 4-/5   Ankle dorsiflexion: 3+/5 Ankle dorsiflexion: 4/5       Functional limitation reporting disability percentage was obtained through use of FOTO Lumbar Survey indicating 43% disability      SL Balance: even surface R: 3 seconds; L = 2 seconds     Home Exercises Provided and Patient Education Provided     Education provided:   - continue HEP post discharge     Written Home Exercises Provided: yes.  Exercises were reviewed and Mariann was able to demonstrate them prior to the end of the session.  Mariann demonstrated good  understanding of " the education provided.     See EMR under Patient Instructions for exercises provided 8/19/2019.    Assessment     Orlester has demonstrated significant improvement since beginning therapy. Patient has demonstrated improvements in strength, ROM, ambulation ability, and overall functional mobility. Patient has meet 6/7 goals at this time. Patient is no longer ambulating with an AD at this time. Patient complete the above exercise progression with no difficulty. Patient instructed to return to therapy pending symptoms return or persist. Patient has agree to discharge at this time.     Discharge reason: Patient has reached the maximum rehab potential for the present time    Orlester is progressing well towards her goals.   Pt prognosis is Good.     Pt will continue to benefit from skilled outpatient physical therapy to address the deficits listed in the problem list box on initial evaluation, provide pt/family education and to maximize pt's level of independence in the home and community environment.     Pt's spiritual, cultural and educational needs considered and pt agreeable to plan of care and goals.     Anticipated barriers to physical therapy: none     Goals:     Short Term Goals: 4 weeks   Patient will be able to ambulate for 30 minutes with SPC  on even and uneven with min-no pain/difficulty in order to grocery shop. MET  Patient will be able to perform sit to stand transfer without UE support with min-no pain/difficulty. MET  Patient will be able to perform bed mobility with min-no pain/difficulty. MET  Increase SL balance to 10 seconds (B) with minimal LOB in order to reduce risk of falls and increase global stability. Partially MET     Long Term Goals: 8 weeks   Patient will be able to ambulate for 1 hour with SPC on even and uneven with min-no pain/difficulty in order to grocery shop. MET  Patient will be able to sit for 2 hours in order to watch movie with family. MET  Patient will be able to transfer in  and out of the tub SBA in order to bath independently. MET    Plan     This patient is discharged from Physical Therapy    Mallory Dunne, PT, DPT

## 2019-10-16 ENCOUNTER — PATIENT OUTREACH (OUTPATIENT)
Dept: ADMINISTRATIVE | Facility: OTHER | Age: 59
End: 2019-10-16

## 2019-10-16 ENCOUNTER — TELEPHONE (OUTPATIENT)
Dept: NEUROSURGERY | Facility: CLINIC | Age: 59
End: 2019-10-16

## 2019-10-16 NOTE — TELEPHONE ENCOUNTER
----- Message from Mk George MD sent at 10/15/2019  8:25 PM CDT -----  This patient needed a follow-up for her lumbar fusion. Can you please call her asap and give her an appointment with me this Friday morning at main campus if possible.    Thanks    DD

## 2019-10-18 ENCOUNTER — LAB VISIT (OUTPATIENT)
Dept: LAB | Facility: HOSPITAL | Age: 59
End: 2019-10-18
Attending: UROLOGY
Payer: MEDICARE

## 2019-10-18 ENCOUNTER — OFFICE VISIT (OUTPATIENT)
Dept: NEUROSURGERY | Facility: CLINIC | Age: 59
End: 2019-10-18
Payer: MEDICARE

## 2019-10-18 ENCOUNTER — TELEPHONE (OUTPATIENT)
Dept: NEUROSURGERY | Facility: CLINIC | Age: 59
End: 2019-10-18

## 2019-10-18 VITALS
BODY MASS INDEX: 24.14 KG/M2 | HEART RATE: 81 BPM | DIASTOLIC BLOOD PRESSURE: 82 MMHG | WEIGHT: 141.38 LBS | HEIGHT: 64 IN | TEMPERATURE: 98 F | SYSTOLIC BLOOD PRESSURE: 190 MMHG

## 2019-10-18 DIAGNOSIS — E87.6 HYPOKALEMIA: ICD-10-CM

## 2019-10-18 DIAGNOSIS — M53.3 SACROILIAC JOINT DYSFUNCTION OF BOTH SIDES: ICD-10-CM

## 2019-10-18 DIAGNOSIS — S37.10XD: Chronic | ICD-10-CM

## 2019-10-18 DIAGNOSIS — Z98.1 S/P LUMBAR FUSION: Primary | ICD-10-CM

## 2019-10-18 LAB
ANION GAP SERPL CALC-SCNC: 11 MMOL/L (ref 8–16)
BUN SERPL-MCNC: 19 MG/DL (ref 6–20)
CALCIUM SERPL-MCNC: 10.2 MG/DL (ref 8.7–10.5)
CHLORIDE SERPL-SCNC: 102 MMOL/L (ref 95–110)
CO2 SERPL-SCNC: 31 MMOL/L (ref 23–29)
CREAT SERPL-MCNC: 0.9 MG/DL (ref 0.5–1.4)
EST. GFR  (AFRICAN AMERICAN): >60 ML/MIN/1.73 M^2
EST. GFR  (NON AFRICAN AMERICAN): >60 ML/MIN/1.73 M^2
GLUCOSE SERPL-MCNC: 214 MG/DL (ref 70–110)
POTASSIUM SERPL-SCNC: 3.5 MMOL/L (ref 3.5–5.1)
SODIUM SERPL-SCNC: 144 MMOL/L (ref 136–145)

## 2019-10-18 PROCEDURE — 99024 PR POST-OP FOLLOW-UP VISIT: ICD-10-PCS | Mod: S$GLB,,, | Performed by: NEUROLOGICAL SURGERY

## 2019-10-18 PROCEDURE — 99999 PR PBB SHADOW E&M-EST. PATIENT-LVL IV: ICD-10-PCS | Mod: PBBFAC,,, | Performed by: NEUROLOGICAL SURGERY

## 2019-10-18 PROCEDURE — 80048 BASIC METABOLIC PNL TOTAL CA: CPT

## 2019-10-18 PROCEDURE — 36415 COLL VENOUS BLD VENIPUNCTURE: CPT

## 2019-10-18 PROCEDURE — 99999 PR PBB SHADOW E&M-EST. PATIENT-LVL IV: CPT | Mod: PBBFAC,,, | Performed by: NEUROLOGICAL SURGERY

## 2019-10-18 PROCEDURE — 99024 POSTOP FOLLOW-UP VISIT: CPT | Mod: S$GLB,,, | Performed by: NEUROLOGICAL SURGERY

## 2019-10-18 RX ORDER — CELECOXIB 200 MG/1
200 CAPSULE ORAL 2 TIMES DAILY PRN
Qty: 60 CAPSULE | Refills: 2 | Status: ON HOLD | OUTPATIENT
Start: 2019-10-18 | End: 2019-12-11

## 2019-10-18 NOTE — PROGRESS NOTES
NEUROSURGICAL PROGRESS NOTE    DATE OF SERVICE:  10/18/2019    ATTENDING PHYSICIAN:  Mk George MD    SUBJECTIVE:    INTERIM HISTORY:    This is a very pleasant 59 y.o. female, s/p 6 months L5-S1 OLIF 6 months ago. Complicated post-operative course with ureter injury, primarily repaired during initial spine surgery followed by UTI and second urologic surgery when ureter was clamped and stent removed. Urological care managed by Dr Boyd. Patient had a recent nephrostomy tube replacement. Final ureter repair pending while patient is recovering from severe deconditioning from prolonged ICU stay. She has completed PT in [place] with [facility] and has regained strength in her LEs and is now able to walk without a walker. She is complaining of low back pain while sitting or standing. Pain radiates over the SI joint areas. Her right leg pain has completely subsided since the surgery. She would like to do more PT. She is not taking NSAIDs for her pain.               PAST MEDICAL HISTORY:  Active Ambulatory Problems     Diagnosis Date Noted    Hypertension associated with diabetes     Hyperlipidemia     CAD (coronary artery disease)     Sleep apnea     Carotid stenosis, bilateral 10/24/2012    NSTEMI (non-ST elevated myocardial infarction) 02/17/2014    S/P coronary artery stent placement 02/26/2014    Nuclear sclerosis - Right Eye 03/18/2014    Primary localized osteoarthrosis, lower leg 06/18/2014    Chronic sinusitis 05/07/2015    PSC (posterior subcapsular cataract), right 08/17/2015    DME (diabetic macular edema) 08/17/2015    Refractive error 08/17/2015    Pseudophakia 08/17/2015    Senile nuclear sclerosis 09/01/2015    Type 2 diabetes mellitus with diabetic peripheral angiopathy without gangrene 02/24/2016    Diabetic peripheral neuropathy associated with type 2 diabetes mellitus 02/24/2016    Cervical arthritis 08/29/2016    Neurogenic claudication 08/29/2016    Lumbar herniated disc 10/24/2016     Fatty liver disease, nonalcoholic 11/01/2017    Arthritis of lumbar spine 07/23/2018    Chronic pain 09/25/2018    Lumbar radiculopathy 02/06/2019    Lumbosacral radiculopathy at L5 04/12/2019    Ureteral transection of left native kidney 04/13/2019    Type 2 diabetes mellitus with stage 2 chronic kidney disease and hypertension 04/20/2019    Asthma 04/20/2019    Normocytic anemia     Bradycardia 05/08/2019    Delayed surgical wound healing 05/13/2019    Rectal ulcer     Palliative care encounter 05/27/2019    Protein malnutrition     Acute deep vein thrombosis (DVT) of femoral vein of right lower extremity     Impaired functional mobility and endurance 05/31/2019    (HFpEF) heart failure with preserved ejection fraction 06/06/2019    Alteration in skin integrity 06/07/2019    Debility 06/10/2019    Staph infection     Chronic combined systolic and diastolic CHF (congestive heart failure) 07/02/2019    Nephrostomy status 07/11/2019    Nephrostomy tube displaced 07/18/2019    Left ureteral injury 07/24/2019    Poor balance 08/19/2019    Gait, antalgic 08/19/2019    Hydronephrosis 10/07/2019     Resolved Ambulatory Problems     Diagnosis Date Noted    Diabetes mellitus type II, uncontrolled 10/24/2012    Obesity 10/24/2012    Neck pain 03/07/2013    Non compliance with medical treatment 06/19/2013    PAPA (acute kidney injury) 02/17/2014    Coronary stent restenosis 02/26/2014    Type II or unspecified type diabetes mellitus with other specified manifestations, uncontrolled 06/06/2014    Peripheral neuropathy 06/06/2014    Enthesopathy of hip region 06/18/2014    Type II or unspecified type diabetes mellitus with peripheral circulatory disorders, uncontrolled(250.72) 09/25/2014    Diabetes mellitus with peripheral artery disease 02/02/2015    Dysphagia 08/12/2016    Neck pain on right side 10/14/2016    Chronic bilateral low back pain with sciatica 10/14/2016    Decreased  range of motion 10/14/2016    Decreased strength 10/14/2016    Decreased functional mobility 10/14/2016    Closed nondisplaced fracture of fifth metatarsal bone of right foot with routine healing 07/14/2017    Ureteral stent retained 04/16/2019    Sepsis 04/20/2019    Encephalopathy 04/20/2019    Altered mental status     Encounter for weaning from ventilator     Metabolic acidosis     At risk for fluid volume overload 04/21/2019    On enteral nutrition 04/24/2019    Postoperative infection     Acute postoperative respiratory failure     Pulmonary edema     Dermatitis associated with moisture 05/06/2019    BRBPR (bright red blood per rectum) 05/07/2019    Urinary tract infection without hematuria     Status post insertion of percutaneous endoscopic gastrostomy (PEG) tube     Pain     Acute on chronic respiratory failure with hypoxia 06/06/2019    Elevated troponin 06/06/2019    Fever 06/07/2019    Pneumonia of both lungs due to Pseudomonas species 06/09/2019    Acute combined systolic and diastolic heart failure 06/13/2019    Excessive carbohydrate intake 06/14/2019    Tracheostomy in place     Weakness of both lower extremities 08/19/2019     Past Medical History:   Diagnosis Date    Anticoagulant long-term use     Asthma     Back pain     Bradycardia, unspecified 5/8/2019    Carotid artery stenosis     Diabetes mellitus type 2 in obese     HTN (hypertension), benign     Keloid cicatrix     NPDR (nonproliferative diabetic retinopathy) 8/17/2015       PAST SURGICAL HISTORY:  Past Surgical History:   Procedure Laterality Date    BRONCHOSCOPY N/A 5/2/2019    Procedure: BRONCHOSCOPY;  Surgeon: Sean Ruano MD;  Location: [facility] OR [address] [place] [address];  Service: General;  Laterality: N/A;    CARDIAC CATHETERIZATION      cataract extraction left eye      cataracts      CAUDAL EPIDURAL STEROID INJECTION N/A 2/7/2019    Procedure: Injection-steroid-epidural-caudal;  Surgeon: Dave URBANO  Mc Farah MD;  Location: [facility] PAIN MGT;  Service: Pain Management;  Laterality: N/A;     SECTION, LOW TRANSVERSE      COLONOSCOPY N/A 2019    Procedure: COLONOSCOPY;  Surgeon: Al Alaniz MD;  [facility]  Service: Endoscopy;  Laterality: N/A;    CORONARY ANGIOPLASTY      ESOPHAGOGASTRODUODENOSCOPY W/ PEG  2019    Procedure: EGD, WITH PEG TUBE INSERTION;  Surgeon: Sean Ruano MD;  Location: [address];  Service: General;;    EXCISION TURBINATE, SUBMUCOUS      FLEXIBLE SIGMOIDOSCOPY N/A 2019    Procedure: SIGMOIDOSCOPY, FLEXIBLE;  Surgeon: ALBERTO Amin MD;  [facility]  Service: Endoscopy;  Laterality: N/A;    FLEXIBLE SIGMOIDOSCOPY N/A 2019    Procedure: SIGMOIDOSCOPY, FLEXIBLE;  Surgeon: ALBERTO Amin MD;  Location: [facility] ([address] [place] [address]);  Service: Endoscopy;  Laterality: N/A;    FUSION OF LUMBAR SPINE BY ANTERIOR APPROACH Left 2019    Procedure: FUSION, SPINE, LUMBAR, ANTERIOR APPROACH Left L5-S1 Anterior to Psoa Interbody Fusion, L5-S1 Posterior Instrumentation;  Surgeon: Mk George MD;  Location: [address];  Service: Neurosurgery;  Laterality: Left;  Porcedure:  Left L5-S1 Anterior to Psoa Interbody Fusion, L5-S1 Posterior Instrumentation  Surgery Time: 4 Hrs  LOS: 2-3  Anesthesia: General   Blood: Type & Screen  R    HAND SURGERY Left     HAND SURGERY Right     torn ligament repair/ Dr. Yeboah    HYSTERECTOMY      left foot surgery      left wrist surgery      NASAL SEPTUM SURGERY  5/7/15    PERCUTANEOUS NEPHROSTOMY Left 2019    Procedure: Creation, Nephrostomy, Percutaneous;  Surgeon: Karina Surgeon;  Location: [facility];  Service: Anesthesiology;  Laterality: Left;    REPAIR OF URETER  2019    Procedure: REPAIR, URETER;  Surgeon: Mk George MD;  Location: [address];  Service: Neurosurgery;;    REPLACEMENT OF NEPHROSTOMY TUBE N/A 2019    Procedure:  REPLACEMENT, NEPHROSTOMY TUBE;  Surgeon: [facility] Diagnostic Provider;  Location: [facility] OR 2ND FLR;  Service: Anesthesiology;  Laterality: N/A;  188    REPLACEMENT OF NEPHROSTOMY TUBE N/A 7/24/2019    Procedure: REPLACEMENT, NEPHROSTOMY TUBE;  Surgeon: [facility] Diagnostic Provider;  Location: [facility] OR 2ND FLR;  Service: Anesthesiology;  Laterality: N/A;  188    REPLACEMENT OF NEPHROSTOMY TUBE N/A 10/7/2019    Procedure: REPLACEMENT, NEPHROSTOMY TUBE;  Surgeon: [facility] Diagnostic Provider;  Location: [facility] OR 2ND FLR;  Service: Anesthesiology;  Laterality: N/A;  189    rt elbow surgery      S/P LAD COATED STENT  05/14/2010    6 total     S/P OM1 STENT  08/2003    SINUS SURGERY      F.E.S.S.    TRACHEOSTOMY N/A 5/2/2019    Procedure: CREATION, TRACHEOSTOMY;  Surgeon: Sean Ruano MD;  [facility];  Service: General;  Laterality: N/A;    TUBAL LIGATION         SOCIAL HISTORY:   Social History     Socioeconomic History    Marital status:      Spouse name: Shamir    Number of children: 2    Years of education: Not on file    Highest education level: Not on file   Occupational History    Occupation: cafeteria     Employer: [employer]     Employer: [employer]     Employer: [employer]   Social Needs    Financial resource strain: Not on file    Food insecurity:     Worry: Not on file     Inability: Not on file    Transportation needs:     Medical: Not on file     Non-medical: Not on file   Tobacco Use    Smoking status: Never Smoker    Smokeless tobacco: Never Used   Substance and Sexual Activity    Alcohol use: No     Alcohol/week: 0.0 standard drinks    Drug use: No    Sexual activity: Yes     Partners: Male     Birth control/protection: Post-menopausal     Comment:    Lifestyle    Physical activity:     Days per week: Not on file     Minutes per session: Not on file    Stress: Not on file   Relationships    Social connections:     Talks on  phone: Not on file     Gets together: Not on file     Attends Sabianist service: Not on file     Active member of club or organization: Not on file     Attends meetings of clubs or organizations: Not on file     Relationship status: Not on file   Other Topics Concern    Are you pregnant or think you may be? No    Breast-feeding No   Social History Narrative    . 2 children.        FAMILY HISTORY:  Family History   Problem Relation Age of Onset    Diabetes Mother     Heart disease Mother     Diabetes Father     Leukemia Father         leukemia    Heart attack Father     Diabetes Sister     Diabetes Brother     Diabetes Sister     No Known Problems Sister     No Known Problems Brother     No Known Problems Brother     No Known Problems Maternal Grandmother     No Known Problems Maternal Grandfather     No Known Problems Paternal Grandmother     No Known Problems Paternal Grandfather     No Known Problems Son     No Known Problems Son     No Known Problems Maternal Aunt     No Known Problems Maternal Uncle     No Known Problems Paternal Aunt     No Known Problems Paternal Uncle     Colon cancer Neg Hx     Inflammatory bowel disease Neg Hx     Melanoma Neg Hx     Psoriasis Neg Hx     Lupus Neg Hx     Eczema Neg Hx     Acne Neg Hx     Amblyopia Neg Hx     Blindness Neg Hx     Cancer Neg Hx     Cataracts Neg Hx     Glaucoma Neg Hx     Hypertension Neg Hx     Macular degeneration Neg Hx     Retinal detachment Neg Hx     Strabismus Neg Hx     Stroke Neg Hx     Thyroid disease Neg Hx     Heart failure Neg Hx     Hyperlipidemia Neg Hx        CURRENTS MEDICATIONS:  Current Outpatient Medications on File Prior to Visit   Medication Sig Dispense Refill    ACCU-CHEK EDIN PLUS METER Misc       albuterol (PROVENTIL/VENTOLIN HFA) 90 mcg/actuation inhaler Inhale 2 puffs into the lungs every 6 (six) hours as needed for Wheezing. 1 each 11    albuterol-ipratropium (DUO-NEB) 2.5  mg-0.5 mg/3 mL nebulizer solution Inhale 3 mLs into the lungs.      amLODIPine (NORVASC) 10 MG tablet 10 mg by Tube route.      aspirin 81 mg Tab Take 81 mg by mouth. 1 Tablet Oral Every day      atorvastatin (LIPITOR) 80 MG tablet 1 tablet (80 mg total) by Per G Tube route once daily. 90 tablet 3    blood sugar diagnostic (ACCU-CHEK EDIN PLUS TEST STRP) Strp TEST BLOOD SUGARS 4 TIMES DAILY 150 strip 11    clopidogrel (PLAVIX) 75 mg tablet Take 1 tablet (75 mg total) by mouth once daily. 30 tablet 3    escitalopram oxalate (LEXAPRO) 10 MG tablet Take 1 tablet (10 mg total) by mouth once daily. 30 tablet 11    famotidine (PEPCID) 20 MG tablet Take 1 tablet (20 mg total) by mouth 2 (two) times daily. 60 tablet 11    furosemide (LASIX) 40 MG tablet Take 1 tablet (40 mg total) by mouth once daily. 30 tablet 11    gabapentin (NEURONTIN) 300 MG capsule Take 1 capsule (300 mg total) by mouth 2 (two) times daily. 60 capsule 11    HYDROcodone-acetaminophen (NORCO) 5-325 mg per tablet Take 1 tablet by mouth every 6 (six) hours as needed for Pain. 28 tablet 0    insulin aspart U-100 (NOVOLOG) 100 unit/mL (3 mL) InPn pen Inject 1-10 Units into the skin 4 (four) times daily before meals and nightly. 12 mL 11    losartan (COZAAR) 50 MG tablet Take 1 tablet (50 mg total) by mouth once daily. 90 tablet 3    metoprolol tartrate (LOPRESSOR) 25 MG tablet Take 1 tablet (25 mg total) by mouth 2 (two) times daily. 60 tablet 11    multivitamin (THERAGRAN) per tablet Take 1 tablet by mouth once daily.      nitroGLYCERIN (NITROSTAT) 0.4 MG SL tablet Take one every 2-3 min till chestpain relief for 3 times and if still no relief, call MD or come to ED 30 tablet 11    ondansetron (ZOFRAN-ODT) 8 MG TbDL Take 1 tablet (8 mg total) by mouth every 12 (twelve) hours as needed. 30 tablet 2    potassium chloride SA (K-DUR,KLOR-CON) 20 MEQ tablet Take 1 tablet (20 mEq total) by mouth 2 (two) times daily. 60 tablet 11    rifAMpin  (RIFADIN) 300 MG capsule        Current Facility-Administered Medications on File Prior to Visit   Medication Dose Route Frequency Provider Last Rate Last Dose    ciprofloxacin HCl tablet 500 mg  500 mg Oral Once Robin Boyd MD        lidocaine (PF) 10 mg/ml (1%) injection 10 mg  1 mL Intradermal Once Dave Bentley Jr., MD        lidocaine HCl 2% urojet   Urethral Once Robin Boyd MD           ALLERGIES:  Review of patient's allergies indicates:   Allergen Reactions    Milk containing products      Causes GI distress       REVIEW OF SYSTEMS:  Review of Systems   Constitutional: Negative for diaphoresis, fever and weight loss.   Respiratory: Negative for shortness of breath.    Cardiovascular: Negative for chest pain.   Gastrointestinal: Negative for blood in stool.   Genitourinary: Negative for hematuria.   Endo/Heme/Allergies: Does not bruise/bleed easily.   All other systems reviewed and are negative.        OBJECTIVE:    PHYSICAL EXAMINATION:   Vitals:    10/18/19 0948   BP: (!) 190/82   Pulse: 81   Temp: 98 °F (36.7 °C)       Physical Exam:  Vitals reviewed.    Constitutional: She appears well-developed and well-nourished.     Eyes: Pupils are equal, round, and reactive to light. Conjunctivae and EOM are normal.     Cardiovascular: Normal distal pulses and no edema.     Abdominal: Soft.     Skin: Skin displays no rash on trunk and no rash on extremities. Skin displays no lesions on trunk and no lesions on extremities.     Psych/Behavior: She is alert. She is oriented to person, place, and time. She has a normal mood and affect.     Musculoskeletal:        Neck: Range of motion is full.     Neurological:        DTRs: Tricep reflexes are 2+ on the right side and 2+ on the left side. Bicep reflexes are 2+ on the right side and 2+ on the left side. Brachioradialis reflexes are 2+ on the right side and 2+ on the left side. Patellar reflexes are 2+ on the right side and 2+ on the left side. Achilles  reflexes are 2+ on the right side and 2+ on the left side.       Back Exam     Tenderness   The patient is experiencing tenderness in the lumbar and sacroiliac.    Muscle Strength   Right Quadriceps:  5/5   Left Quadriceps:  5/5   Right Hamstrings:  5/5   Left Hamstrings:  5/5     Tests   Straight leg raise right: negative  Straight leg raise left: negative                Neurologic Exam     Mental Status   Oriented to person, place, and time.   Speech: speech is normal   Level of consciousness: alert    Cranial Nerves   Cranial nerves II through XII intact.     CN III, IV, VI   Pupils are equal, round, and reactive to light.  Extraocular motions are normal.     Motor Exam   Muscle bulk: normal  Overall muscle tone: normal    Strength   Right deltoid: 5/5  Left deltoid: 5/5  Right biceps: 5/5  Left biceps: 5/5  Right triceps: 5/5  Left triceps: 5/5  Right wrist flexion: 5/5  Left wrist flexion: 5/5  Right wrist extension: 5/5  Left wrist extension: 5/5  Right interossei: 5/5  Left interossei: 5/5  Right iliopsoas: 5/5  Left iliopsoas: 5/5  Right quadriceps: 5/5  Left quadriceps: 5/5  Right hamstrin/5  Left hamstrin/5  Right anterior tibial: 4/5  Left anterior tibial: 5/5  Right posterior tibial: 5/5  Left posterior tibial: 5/5  Right peroneal: 5/5  Left peroneal: 5/5  Right gastroc: 5/5  Left gastroc: 5/5    Sensory Exam   Light touch normal.   Pinprick normal.     Gait, Coordination, and Reflexes     Gait  Gait: normal    Coordination   Finger to nose coordination: normal  Tandem walking coordination: normal    Reflexes   Right brachioradialis: 2+  Left brachioradialis: 2+  Right biceps: 2+  Left biceps: 2+  Right triceps: 2+  Left triceps: 2+  Right patellar: 2+  Left patellar: 2+  Right achilles: 2+  Left achilles: 2+  Right plantar: normal  Left plantar: normal  Right Crump: absent  Left Crump: absent  Right ankle clonus: absent  Left ankle clonus: absent        DIAGNOSTIC DATA:  I personally  interpreted the following imaging:   Today's lumbar XR shows consolidation of L5-S1 fusion    ASSESMENT:  This is a 59 y.o. 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

## 2019-10-18 NOTE — TELEPHONE ENCOUNTER
----- Message from Mk George MD sent at 10/18/2019 10:22 AM CDT -----  Please give her another appointment in 6 weeks at [facility] with me. No XR necessary.

## 2019-10-28 ENCOUNTER — HOSPITAL ENCOUNTER (EMERGENCY)
Facility: HOSPITAL | Age: 59
Discharge: HOME | End: 2019-10-29
Attending: EMERGENCY MEDICINE
Payer: MEDICARE

## 2019-10-28 ENCOUNTER — OFFICE VISIT (OUTPATIENT)
Dept: INTERNAL MEDICINE | Facility: CLINIC | Age: 59
End: 2019-10-28
Payer: MEDICARE

## 2019-10-28 ENCOUNTER — TELEPHONE (OUTPATIENT)
Dept: INTERNAL MEDICINE | Facility: CLINIC | Age: 59
End: 2019-10-28

## 2019-10-28 VITALS
WEIGHT: 140.19 LBS | TEMPERATURE: 99 F | DIASTOLIC BLOOD PRESSURE: 78 MMHG | SYSTOLIC BLOOD PRESSURE: 146 MMHG | HEIGHT: 64 IN | RESPIRATION RATE: 14 BRPM | BODY MASS INDEX: 23.93 KG/M2 | HEART RATE: 68 BPM

## 2019-10-28 VITALS
DIASTOLIC BLOOD PRESSURE: 70 MMHG | BODY MASS INDEX: 26.98 KG/M2 | OXYGEN SATURATION: 100 % | HEART RATE: 100 BPM | RESPIRATION RATE: 18 BRPM | TEMPERATURE: 100 F | HEIGHT: 64 IN | SYSTOLIC BLOOD PRESSURE: 138 MMHG | WEIGHT: 158 LBS

## 2019-10-28 DIAGNOSIS — R50.9 FEVER: Primary | ICD-10-CM

## 2019-10-28 DIAGNOSIS — N39.0 URINARY TRACT INFECTION ASSOCIATED WITH NEPHROSTOMY CATHETER, INITIAL ENCOUNTER: ICD-10-CM

## 2019-10-28 DIAGNOSIS — T83.512A URINARY TRACT INFECTION ASSOCIATED WITH NEPHROSTOMY CATHETER, INITIAL ENCOUNTER: ICD-10-CM

## 2019-10-28 DIAGNOSIS — R10.31 RIGHT LOWER QUADRANT ABDOMINAL PAIN: ICD-10-CM

## 2019-10-28 DIAGNOSIS — Z93.6 NEPHROSTOMY STATUS: ICD-10-CM

## 2019-10-28 DIAGNOSIS — R82.90 CLOUDY URINE: ICD-10-CM

## 2019-10-28 DIAGNOSIS — K57.92 DIVERTICULITIS: Primary | ICD-10-CM

## 2019-10-28 LAB
ALBUMIN SERPL BCP-MCNC: 2.7 G/DL (ref 3.5–5.2)
ALP SERPL-CCNC: 94 U/L (ref 55–135)
ALT SERPL W/O P-5'-P-CCNC: 8 U/L (ref 10–44)
ANION GAP SERPL CALC-SCNC: 11 MMOL/L (ref 8–16)
AST SERPL-CCNC: 12 U/L (ref 10–40)
BACTERIA #/AREA URNS AUTO: ABNORMAL /HPF
BACTERIA #/AREA URNS AUTO: ABNORMAL /HPF
BASOPHILS # BLD AUTO: 0.02 K/UL (ref 0–0.2)
BASOPHILS NFR BLD: 0.2 % (ref 0–1.9)
BILIRUB SERPL-MCNC: 1.3 MG/DL (ref 0.1–1)
BILIRUB UR QL STRIP: NEGATIVE
BILIRUB UR QL STRIP: NEGATIVE
BUN SERPL-MCNC: 18 MG/DL (ref 6–20)
CALCIUM SERPL-MCNC: 9.5 MG/DL (ref 8.7–10.5)
CHLORIDE SERPL-SCNC: 102 MMOL/L (ref 95–110)
CLARITY UR REFRACT.AUTO: ABNORMAL
CLARITY UR REFRACT.AUTO: ABNORMAL
CO2 SERPL-SCNC: 24 MMOL/L (ref 23–29)
COLOR UR AUTO: YELLOW
COLOR UR AUTO: YELLOW
CREAT SERPL-MCNC: 1.2 MG/DL (ref 0.5–1.4)
CREAT SERPL-MCNC: 1.3 MG/DL (ref 0.5–1.4)
DIFFERENTIAL METHOD: ABNORMAL
EOSINOPHIL # BLD AUTO: 0 K/UL (ref 0–0.5)
EOSINOPHIL NFR BLD: 0.2 % (ref 0–8)
ERYTHROCYTE [DISTWIDTH] IN BLOOD BY AUTOMATED COUNT: 14.3 % (ref 11.5–14.5)
EST. GFR  (AFRICAN AMERICAN): 57.6 ML/MIN/1.73 M^2
EST. GFR  (NON AFRICAN AMERICAN): 49.9 ML/MIN/1.73 M^2
GLUCOSE SERPL-MCNC: 124 MG/DL (ref 70–110)
GLUCOSE UR QL STRIP: NEGATIVE
GLUCOSE UR QL STRIP: NEGATIVE
HCT VFR BLD AUTO: 37.2 % (ref 37–48.5)
HGB BLD-MCNC: 11.8 G/DL (ref 12–16)
HGB UR QL STRIP: ABNORMAL
HGB UR QL STRIP: ABNORMAL
HYALINE CASTS UR QL AUTO: 0 /LPF
HYALINE CASTS UR QL AUTO: 0 /LPF
IMM GRANULOCYTES # BLD AUTO: 0.04 K/UL (ref 0–0.04)
IMM GRANULOCYTES NFR BLD AUTO: 0.3 % (ref 0–0.5)
KETONES UR QL STRIP: ABNORMAL
KETONES UR QL STRIP: NEGATIVE
LACTATE SERPL-SCNC: 1.6 MMOL/L (ref 0.5–2.2)
LEUKOCYTE ESTERASE UR QL STRIP: ABNORMAL
LEUKOCYTE ESTERASE UR QL STRIP: ABNORMAL
LYMPHOCYTES # BLD AUTO: 1.6 K/UL (ref 1–4.8)
LYMPHOCYTES NFR BLD: 13.7 % (ref 18–48)
MCH RBC QN AUTO: 26.4 PG (ref 27–31)
MCHC RBC AUTO-ENTMCNC: 31.7 G/DL (ref 32–36)
MCV RBC AUTO: 83 FL (ref 82–98)
MICROSCOPIC COMMENT: ABNORMAL
MICROSCOPIC COMMENT: ABNORMAL
MONOCYTES # BLD AUTO: 1.2 K/UL (ref 0.3–1)
MONOCYTES NFR BLD: 10.1 % (ref 4–15)
NEUTROPHILS # BLD AUTO: 8.8 K/UL (ref 1.8–7.7)
NEUTROPHILS NFR BLD: 75.5 % (ref 38–73)
NITRITE UR QL STRIP: NEGATIVE
NITRITE UR QL STRIP: NEGATIVE
NRBC BLD-RTO: 0 /100 WBC
PH UR STRIP: 5 [PH] (ref 5–8)
PH UR STRIP: 6 [PH] (ref 5–8)
PLATELET # BLD AUTO: 264 K/UL (ref 150–350)
PMV BLD AUTO: 12.5 FL (ref 9.2–12.9)
POTASSIUM SERPL-SCNC: 2.8 MMOL/L (ref 3.5–5.1)
PROCALCITONIN SERPL IA-MCNC: 4.12 NG/ML
PROT SERPL-MCNC: 7.5 G/DL (ref 6–8.4)
PROT UR QL STRIP: ABNORMAL
PROT UR QL STRIP: ABNORMAL
RBC # BLD AUTO: 4.47 M/UL (ref 4–5.4)
RBC #/AREA URNS AUTO: 31 /HPF (ref 0–4)
RBC #/AREA URNS AUTO: >100 /HPF (ref 0–4)
SAMPLE: NORMAL
SODIUM SERPL-SCNC: 137 MMOL/L (ref 136–145)
SP GR UR STRIP: 1.01 (ref 1–1.03)
SP GR UR STRIP: 1.01 (ref 1–1.03)
URN SPEC COLLECT METH UR: ABNORMAL
URN SPEC COLLECT METH UR: ABNORMAL
WBC # BLD AUTO: 11.63 K/UL (ref 3.9–12.7)
WBC #/AREA URNS AUTO: >100 /HPF (ref 0–5)
WBC #/AREA URNS AUTO: >100 /HPF (ref 0–5)

## 2019-10-28 PROCEDURE — 93005 ELECTROCARDIOGRAM TRACING: CPT

## 2019-10-28 PROCEDURE — 93010 ELECTROCARDIOGRAM REPORT: CPT | Mod: ,,, | Performed by: INTERNAL MEDICINE

## 2019-10-28 PROCEDURE — 81001 URINALYSIS AUTO W/SCOPE: CPT

## 2019-10-28 PROCEDURE — 96361 HYDRATE IV INFUSION ADD-ON: CPT

## 2019-10-28 PROCEDURE — 25000003 PHARM REV CODE 250: Performed by: EMERGENCY MEDICINE

## 2019-10-28 PROCEDURE — 87040 BLOOD CULTURE FOR BACTERIA: CPT

## 2019-10-28 PROCEDURE — 87077 CULTURE AEROBIC IDENTIFY: CPT | Mod: 59

## 2019-10-28 PROCEDURE — 99285 EMERGENCY DEPT VISIT HI MDM: CPT | Mod: ,,, | Performed by: EMERGENCY MEDICINE

## 2019-10-28 PROCEDURE — 87088 URINE BACTERIA CULTURE: CPT

## 2019-10-28 PROCEDURE — 85025 COMPLETE CBC W/AUTO DIFF WBC: CPT | Mod: 91

## 2019-10-28 PROCEDURE — 87186 SC STD MICRODIL/AGAR DIL: CPT | Mod: 59

## 2019-10-28 PROCEDURE — 80053 COMPREHEN METABOLIC PANEL: CPT | Mod: 91

## 2019-10-28 PROCEDURE — 3008F BODY MASS INDEX DOCD: CPT | Mod: CPTII,S$GLB,, | Performed by: INTERNAL MEDICINE

## 2019-10-28 PROCEDURE — 3008F PR BODY MASS INDEX (BMI) DOCUMENTED: ICD-10-PCS | Mod: CPTII,S$GLB,, | Performed by: INTERNAL MEDICINE

## 2019-10-28 PROCEDURE — 96374 THER/PROPH/DIAG INJ IV PUSH: CPT | Mod: 59

## 2019-10-28 PROCEDURE — 87186 SC STD MICRODIL/AGAR DIL: CPT

## 2019-10-28 PROCEDURE — 87088 URINE BACTERIA CULTURE: CPT | Mod: 59

## 2019-10-28 PROCEDURE — 3077F PR MOST RECENT SYSTOLIC BLOOD PRESSURE >= 140 MM HG: ICD-10-PCS | Mod: CPTII,S$GLB,, | Performed by: INTERNAL MEDICINE

## 2019-10-28 PROCEDURE — 3077F SYST BP >= 140 MM HG: CPT | Mod: CPTII,S$GLB,, | Performed by: INTERNAL MEDICINE

## 2019-10-28 PROCEDURE — 99285 EMERGENCY DEPT VISIT HI MDM: CPT | Mod: 25

## 2019-10-28 PROCEDURE — 99214 PR OFFICE/OUTPT VISIT, EST, LEVL IV, 30-39 MIN: ICD-10-PCS | Mod: S$GLB,,, | Performed by: INTERNAL MEDICINE

## 2019-10-28 PROCEDURE — 3078F PR MOST RECENT DIASTOLIC BLOOD PRESSURE < 80 MM HG: ICD-10-PCS | Mod: CPTII,S$GLB,, | Performed by: INTERNAL MEDICINE

## 2019-10-28 PROCEDURE — 99999 PR PBB SHADOW E&M-EST. PATIENT-LVL III: ICD-10-PCS | Mod: PBBFAC,,, | Performed by: INTERNAL MEDICINE

## 2019-10-28 PROCEDURE — 84145 PROCALCITONIN (PCT): CPT

## 2019-10-28 PROCEDURE — 87086 URINE CULTURE/COLONY COUNT: CPT | Mod: 59

## 2019-10-28 PROCEDURE — 87077 CULTURE AEROBIC IDENTIFY: CPT

## 2019-10-28 PROCEDURE — 99285 PR EMERGENCY DEPT VISIT,LEVEL V: ICD-10-PCS | Mod: ,,, | Performed by: EMERGENCY MEDICINE

## 2019-10-28 PROCEDURE — 3078F DIAST BP <80 MM HG: CPT | Mod: CPTII,S$GLB,, | Performed by: INTERNAL MEDICINE

## 2019-10-28 PROCEDURE — 63600175 PHARM REV CODE 636 W HCPCS: Performed by: EMERGENCY MEDICINE

## 2019-10-28 PROCEDURE — 93010 EKG 12-LEAD: ICD-10-PCS | Mod: ,,, | Performed by: INTERNAL MEDICINE

## 2019-10-28 PROCEDURE — 25500020 PHARM REV CODE 255: Performed by: EMERGENCY MEDICINE

## 2019-10-28 PROCEDURE — 82565 ASSAY OF CREATININE: CPT

## 2019-10-28 PROCEDURE — 99999 PR PBB SHADOW E&M-EST. PATIENT-LVL III: CPT | Mod: PBBFAC,,, | Performed by: INTERNAL MEDICINE

## 2019-10-28 PROCEDURE — 83605 ASSAY OF LACTIC ACID: CPT

## 2019-10-28 PROCEDURE — 81001 URINALYSIS AUTO W/SCOPE: CPT | Mod: 91

## 2019-10-28 PROCEDURE — 99214 OFFICE O/P EST MOD 30 MIN: CPT | Mod: S$GLB,,, | Performed by: INTERNAL MEDICINE

## 2019-10-28 PROCEDURE — 87086 URINE CULTURE/COLONY COUNT: CPT

## 2019-10-28 RX ORDER — CEPHALEXIN 500 MG/1
500 CAPSULE ORAL EVERY 8 HOURS
Qty: 21 CAPSULE | Refills: 0 | Status: SHIPPED | OUTPATIENT
Start: 2019-10-28 | End: 2019-11-04

## 2019-10-28 RX ORDER — CIPROFLOXACIN 500 MG/1
500 TABLET ORAL EVERY 12 HOURS
Qty: 20 TABLET | Refills: 0 | Status: SHIPPED | OUTPATIENT
Start: 2019-10-28 | End: 2019-11-07

## 2019-10-28 RX ORDER — CEFTRIAXONE 1 G/1
1 INJECTION, POWDER, FOR SOLUTION INTRAMUSCULAR; INTRAVENOUS
Status: COMPLETED | OUTPATIENT
Start: 2019-10-28 | End: 2019-10-28

## 2019-10-28 RX ORDER — POTASSIUM CHLORIDE 20 MEQ/15ML
60 SOLUTION ORAL
Status: COMPLETED | OUTPATIENT
Start: 2019-10-28 | End: 2019-10-28

## 2019-10-28 RX ORDER — METRONIDAZOLE 500 MG/1
500 TABLET ORAL 3 TIMES DAILY
Qty: 30 TABLET | Refills: 0 | Status: SHIPPED | OUTPATIENT
Start: 2019-10-28 | End: 2019-11-07

## 2019-10-28 RX ADMIN — SODIUM CHLORIDE 1641 ML: 0.9 INJECTION, SOLUTION INTRAVENOUS at 07:10

## 2019-10-28 RX ADMIN — IOHEXOL 75 ML: 350 INJECTION, SOLUTION INTRAVENOUS at 07:10

## 2019-10-28 RX ADMIN — CEFTRIAXONE SODIUM 1 G: 1 INJECTION, POWDER, FOR SOLUTION INTRAMUSCULAR; INTRAVENOUS at 08:10

## 2019-10-28 RX ADMIN — POTASSIUM CHLORIDE 60 MEQ: 20 SOLUTION ORAL at 10:10

## 2019-10-28 NOTE — TELEPHONE ENCOUNTER
----- Message from Jasbir Haney MD sent at 10/28/2019  2:45 PM CDT -----  Blood counts, most electrolytes, liver function are normal.  Kidney function has decreased significantly.  Patient is hypokalemic.  With the change in kidney function, and a suspected kidney infection, patient to go to the emergency room.

## 2019-10-28 NOTE — TELEPHONE ENCOUNTER
Spoke with patient, results reviewed, Notified patient to go to ER after CT scan today. She repeated instructions and will comply

## 2019-10-28 NOTE — ED PROVIDER NOTES
Encounter Date: 10/28/2019    SCRIBE #1 NOTE: I, Sola Garcia, am scribing for, and in the presence of,  Dr. Juan Alberto Barber. I have scribed the following portions of the note - Other sections scribed: HPI and ROS.       History     Chief Complaint   Patient presents with    Female  Problem     nephrostomy tube, my dr thinks uti, nvd     Ms. Huff is a 59 year old female with a past medical history of NSTEMI, type 2 diabetes, and coronary artery disease with complaints of nephrostomy tube odor, subjective fever, and diarrhea. The patient reports the diarrhea with associated nausea and vomiting began on 10/26/19. She states the nausea and vomiting has resolved at this time, but she continues to have loose stools. The patient reports that she also had a subjective fever of 101 today and she last ate on 10/26/19. She states that she was sent to a [facility] for a CT, but was sent here to [facility] due to a lack of urology at the clinic. The patient reports that she has noticed a foul smelling odor coming from her nephrostomy tube. She states that she has a nephrostomy bag due to the cutting of her ureter during back surgery on 4/12/19. The patient denies leg swelling, known sick contacts, chills, hematuria, and dysuria.  Onset of symptoms were sudden, associated with diarrhea, nausea, vomiting and found urine from her nephrostomy bag.  Her primary care/urologist is concerned with UTI and possible urosepsis.  No other aggravating or alleviating factors.    The history is provided by the patient, the spouse and medical records.     Review of patient's allergies indicates:   Allergen Reactions    Milk containing products      Causes GI distress     Past Medical History:   Diagnosis Date    Anticoagulant long-term use     Asthma     Back pain     Bradycardia, unspecified 5/8/2019    The etiology of the bradycardic episode is unclear.  The have appear to be respiratory in origin (at least the 1st  episode).  We will continue to monitor carefully.  We are awaiting evaluation by Cardiology.      CAD (coronary artery disease)     s/p stentimg  (2), (1)    Carotid artery stenosis     Chronic combined systolic and diastolic CHF (congestive heart failure) 2019    Diabetes mellitus type 2 in obese     HTN (hypertension), benign     Hyperlipidemia     Keloid cicatrix     NPDR (nonproliferative diabetic retinopathy) 2015    NSTEMI (non-ST elevated myocardial infarction)     Nuclear sclerosis - Right Eye 3/18/2014    Primary localized osteoarthrosis, lower leg 2014    Sleep apnea      Past Surgical History:   Procedure Laterality Date    BRONCHOSCOPY N/A 2019    Procedure: BRONCHOSCOPY;  Surgeon: Sean Ruano MD;  Location: [facility] OR [address] [place] [address];  Service: General;  Laterality: N/A;    CARDIAC CATHETERIZATION      cataract extraction left eye      cataracts      CAUDAL EPIDURAL STEROID INJECTION N/A 2019    Procedure: Injection-steroid-epidural-caudal;  Surgeon: Dave Bentley Jr., MD;  Location: [facility] PAIN MGT;  Service: Pain Management;  Laterality: N/A;     SECTION, LOW TRANSVERSE      COLONOSCOPY N/A 2019    Procedure: COLONOSCOPY;  Surgeon: Al Alaniz MD;  Location: [facility] ([address] [place] [address]);  Service: Endoscopy;  Laterality: N/A;    CORONARY ANGIOPLASTY      ESOPHAGOGASTRODUODENOSCOPY W/ PEG  2019    Procedure: EGD, WITH PEG TUBE INSERTION;  Surgeon: Sean Ruano MD;  Location: [facility] OR [address] [place] [address];  Service: General;;    EXCISION TURBINATE, SUBMUCOUS      FLEXIBLE SIGMOIDOSCOPY N/A 2019    Procedure: SIGMOIDOSCOPY, FLEXIBLE;  Surgeon: ALBERTO Amin MD;  [facility]  Service: Endoscopy;  Laterality: N/A;    FLEXIBLE SIGMOIDOSCOPY N/A 2019    Procedure: SIGMOIDOSCOPY, FLEXIBLE;  Surgeon: ALBERTO Amin MD;  Location: [facility] ENDO ([address] [place] [address]);  Service: Endoscopy;  Laterality: N/A;    FUSION OF LUMBAR SPINE BY  ANTERIOR APPROACH Left 4/12/2019    Procedure: FUSION, SPINE, LUMBAR, ANTERIOR APPROACH Left L5-S1 Anterior to Psoa Interbody Fusion, L5-S1 Posterior Instrumentation;  Surgeon: Mk George MD;  Location: [address];  Service: Neurosurgery;  Laterality: Left;  Porcedure:  Left L5-S1 Anterior to Psoa Interbody Fusion, L5-S1 Posterior Instrumentation  Surgery Time: 4 Hrs  LOS: 2-3  Anesthesia: General   Blood: Type & Screen  R    HAND SURGERY Left     HAND SURGERY Right     torn ligament repair/ Dr. Yeboah    HYSTERECTOMY      left foot surgery      left wrist surgery      NASAL SEPTUM SURGERY  5/7/15    PERCUTANEOUS NEPHROSTOMY Left 4/21/2019    Procedure: Creation, Nephrostomy, Percutaneous;  Surgeon: Karina Surgeon;  Location: [facility];  Service: Anesthesiology;  Laterality: Left;    REPAIR OF URETER  4/12/2019    Procedure: REPAIR, URETER;  Surgeon: Mk George MD;  Location: [address];  Service: Neurosurgery;;    REPLACEMENT OF NEPHROSTOMY TUBE N/A 7/18/2019    Procedure: REPLACEMENT, NEPHROSTOMY TUBE;  Surgeon: [facility];  [address];  Service: Anesthesiology;  Laterality: N/A;  188    REPLACEMENT OF NEPHROSTOMY TUBE N/A 7/24/2019    Procedure: REPLACEMENT, NEPHROSTOMY TUBE;  Surgeon: [facility] Provider;  Location: [address];  Service: Anesthesiology;  Laterality: N/A;  188    REPLACEMENT OF NEPHROSTOMY TUBE N/A 10/7/2019    Procedure: REPLACEMENT, NEPHROSTOMY TUBE;  Surgeon: [facility] Provider;  Location: [address];  Service: Anesthesiology;  Laterality: N/A;  189    rt elbow surgery      S/P LAD COATED STENT  05/14/2010    6 total     S/P OM1 STENT  08/2003    SINUS SURGERY      F.E.S.S.    TRACHEOSTOMY N/A 5/2/2019    Procedure: CREATION, TRACHEOSTOMY;  Surgeon: Sean Ruano MD;  Location: [address];  Service: General;  Laterality: N/A;    TUBAL LIGATION       Family History   Problem Relation Age of Onset     Diabetes Mother     Heart disease Mother     Diabetes Father     Leukemia Father         leukemia    Heart attack Father     Diabetes Sister     Diabetes Brother     Diabetes Sister     No Known Problems Sister     No Known Problems Brother     No Known Problems Brother     No Known Problems Maternal Grandmother     No Known Problems Maternal Grandfather     No Known Problems Paternal Grandmother     No Known Problems Paternal Grandfather     No Known Problems Son     No Known Problems Son     No Known Problems Maternal Aunt     No Known Problems Maternal Uncle     No Known Problems Paternal Aunt     No Known Problems Paternal Uncle     Colon cancer Neg Hx     Inflammatory bowel disease Neg Hx     Melanoma Neg Hx     Psoriasis Neg Hx     Lupus Neg Hx     Eczema Neg Hx     Acne Neg Hx     Amblyopia Neg Hx     Blindness Neg Hx     Cancer Neg Hx     Cataracts Neg Hx     Glaucoma Neg Hx     Hypertension Neg Hx     Macular degeneration Neg Hx     Retinal detachment Neg Hx     Strabismus Neg Hx     Stroke Neg Hx     Thyroid disease Neg Hx     Heart failure Neg Hx     Hyperlipidemia Neg Hx      Social History     Tobacco Use    Smoking status: Never Smoker    Smokeless tobacco: Never Used   Substance Use Topics    Alcohol use: No     Alcohol/week: 0.0 standard drinks    Drug use: No     Review of Systems   Constitutional: Positive for fever. Negative for chills.   HENT: Positive for rhinorrhea. Negative for sore throat.    Eyes: Negative for photophobia and visual disturbance.   Respiratory: Negative for cough and shortness of breath.    Cardiovascular: Negative for chest pain.   Gastrointestinal: Positive for diarrhea. Negative for nausea and vomiting.   Genitourinary: Negative for dysuria and hematuria.        + foul odor from nephrostomy tube.    Musculoskeletal: Negative for back pain and neck pain.   Skin: Negative for rash.   Neurological: Negative for weakness and  numbness.   Hematological: Does not bruise/bleed easily.       Physical Exam     Initial Vitals [10/28/19 1719]   BP Pulse Resp Temp SpO2   (!) 140/65 (!) 113 18 100.2 °F (37.9 °C) 99 %      MAP       --         Physical Exam  Gen/Constitutional: Interactive. No acute distress  Head: Normocephalic, Atraumatic  Neck: supple, no masses or LAD  Eyes: PERRLA, conjunctiva clear  Ears, Nose and Throat: No rhinorrhea or stridor.  Cardiac:  Tachycardic rate, Reg Rhythm, No murmur  Pulmonary: CTA Bilat, no wheezes, rhonchi, rales.  GI: Abdomen soft, non-tender, non-distended; no rebound or guarding  : No CVA tenderness. Left flank nephrostomy to draining foul odor, discolored, cloudy and hazy dark urine.  Musculoskeletal: Extremities warm, well perfused, no erythema, no edema  Skin: No rashes  Neuro: Alert and Oriented x 3; No focal motor or sensory deficits.    Psych: Normal affect    ED Course   Procedures  Labs Reviewed   CBC W/ AUTO DIFFERENTIAL - Abnormal; Notable for the following components:       Result Value    Hemoglobin 11.8 (*)     Mean Corpuscular Hemoglobin 26.4 (*)     Mean Corpuscular Hemoglobin Conc 31.7 (*)     Gran # (ANC) 8.8 (*)     Mono # 1.2 (*)     Gran% 75.5 (*)     Lymph% 13.7 (*)     All other components within normal limits   PROCALCITONIN - Abnormal; Notable for the following components:    Procalcitonin 4.12 (*)     All other components within normal limits   CULTURE, BLOOD   CULTURE, BLOOD   COMPREHENSIVE METABOLIC PANEL   LACTIC ACID, PLASMA   URINALYSIS, REFLEX TO URINE CULTURE   URINALYSIS MICROSCOPIC   ISTAT CREATININE   POCT CREATININE          Imaging Results          CT Abdomen Pelvis With Contrast (In process)                  Medical Decision Making:   History:   Old Medical Records: I decided to obtain old medical records.  Initial Assessment:   Ms. Huff is a 59 year old female with a past medical history of NSTEMI, type 2 diabetes, and coronary artery disease with complaints of  nephrostomy tube odor, subjective fever, and diarrhea.   Differential Diagnosis:   Differential diagnosis includes but is not limited to:  Sepsis, enteritis, diverticulitis, UTI, pyelonephritis, intra-abdominal abscess, perforation, obstruction, appendicitis.      Independently Interpreted Test(s):   I have ordered and independently interpreted X-rays - see prior notes.  I have ordered and independently interpreted EKG Reading(s) - see prior notes  Clinical Tests:   Lab Tests: Ordered and Reviewed  Radiological Study: Ordered and Reviewed  Medical Tests: Ordered and Reviewed  Sepsis Perfusion Assessment: I attest, a sepsis perfusion exam was performed within 6 hours of Septic Shock presentation, following fluid resuscitation.    Emergent evaluation of patient presenting with fever, abdominal pain, tachycardia and nephrostomy tube.  Patient seen by her primary care an outside hospital with abdominal pain and concern for UTI from nephrostomy tube.  On arrival she is afebrile to 101, tachycardia with a rate of 113, normotensive and without tachypnea or hypoxemia.  Physical exam findings remarkable for a left flank nephrostomy tube with foul odor to urine, discolored, hazy and cloudy.  Abdominal exam without peritoneal signs however mild right lower quadrant tenderness. Remainder exam unremarkable, tachycardic rate with cardiac exam without murmurs rubs or gallops, lung sounds clear without rales or rhonchi.  Skin exam without rashes, ulcers or lesions. Given infectious symptoms, a broad workup was conducted.  IV lines placed, IV fluids given 30 ml/kg, sepsis order bundle set was used.  Patient has a nephrostomy tube in the left flank, with foul odorous urine, discolored cloudy, will treat for presumptive UTI with ceftriaxone.  Given abdominal tenderness on exam, CT scan of the abdomen/pelvis with IV contrast obtained. Will evaluate for diverticulitis versus intra-abdominal sepsis versus obstruction versus perforation  versus appendicitis.  Discussed this at length with the patient, patient was given IV Zofran, IV fluids, and observed in the ED.  Patient was signed out to the oncoming ED physician team with imaging, re-evaluation in and remainder of labs pending.     Complexity:  High - Level 5          Scribe Attestation:   Scribe #1: I performed the above scribed service and the documentation accurately describes the services I performed. I attest to the accuracy of the note.    I, Dr. Juan Alberto Barber, personally performed the services described in this documentation. All medical record entries made by the scribe were at my direction and in my presence.  I have reviewed the chart and agree that the record reflects my personal performance and is accurate and complete.              Clinical Impression:       ICD-10-CM ICD-9-CM   1. Fever R50.9 780.60   2. Nephrostomy status Z93.6 V44.6         Disposition:   Condition: Fair  Disposition pending re-evaluation, and imaging       Juan Alberto Barber DO  Dept of Emergency Medicine   [facility]  Spectralink: [id number]                   Juan Alberto Barber DO  10/28/19 2022

## 2019-10-28 NOTE — ED NOTES
.  Patient identifiers for Mariann Huff 59 y.o. female checked and correct.  Chief Complaint   Patient presents with    Female  Problem     nephrostomy tube, my dr thinks uti, nvd     Past Medical History:   Diagnosis Date    Anticoagulant long-term use     Asthma     Back pain     Bradycardia, unspecified 5/8/2019    The etiology of the bradycardic episode is unclear.  The have appear to be respiratory in origin (at least the 1st episode).  We will continue to monitor carefully.  We are awaiting evaluation by Cardiology.      CAD (coronary artery disease)     s/p stentimg 2003 (2),2009 (1)    Carotid artery stenosis     Chronic combined systolic and diastolic CHF (congestive heart failure) 7/2/2019    Diabetes mellitus type 2 in obese     HTN (hypertension), benign     Hyperlipidemia     Keloid cicatrix     NPDR (nonproliferative diabetic retinopathy) 8/17/2015    NSTEMI (non-ST elevated myocardial infarction)     Nuclear sclerosis - Right Eye 3/18/2014    Primary localized osteoarthrosis, lower leg 6/18/2014    Sleep apnea      Allergies reported:   Review of patient's allergies indicates:   Allergen Reactions    Milk containing products      Causes GI distress         LOC: Patient is awake, alert, and aware of environment with an appropriate affect. Patient is oriented x 3 and speaking appropriately.  APPEARANCE: Patient resting comfortably and in no acute distress. Patient is clean and well groomed, patient's clothing is properly fastened.  SKIN: The skin is warm and dry. Patient has normal skin turgor and moist mucus membranes. Skin is intact; no bruising or breakdown noted.  MUSKULOSKELETAL: Patient is moving all extremities well, no obvious deformities noted. Pulses intact.   RESPIRATORY: Airway is open and patent. Respirations are spontaneous and non-labored with normal effort and rate, BBS=clear  CARDIAC: Patient has a normal rate and rhythm No peripheral edema noted.   ABDOMEN: Pt  presents with c/o left sided abdominal pain, n/v/d since Saturday. Pt has urostomy tube.   NEUROLOGICAL: PERRL. Facial expression is symmetrical. Hand grasps are equal bilaterally. Normal sensation in all extremities when touched with finger.

## 2019-10-28 NOTE — PROGRESS NOTES
Subjective:       Patient ID: Mariann Huff is a 59 y.o. female.    Chief Complaint: Abdominal Pain and Follow-up (cloudy urine)    HPI     59-year-old female here for evaluation of knots in the stomach.  This started Saturday and is in her left lower quadrant.  The pain is sharp, dull pain.  The pain is getting worse over night.  The pain is always present.  No fevers or chills.  She had diarrhea Saturday and today.  This is loose and watery (no blood).  No exacerbating factors.  It is better when she is lying flat.    She has a nephrostomy, which is very dark and cloudy.  This started yesterday or Saturday.  She usually has a clear output in her ostomy.  She has had this since April 12th.  It has been changed twice.    Review of Systems    Objective:      Physical Exam   Constitutional: She is oriented to person, place, and time. She appears well-developed and well-nourished.   HENT:   Head: Normocephalic and atraumatic.   Mouth/Throat: No oropharyngeal exudate.   Eyes: Pupils are equal, round, and reactive to light. EOM are normal. Right eye exhibits no discharge. Left eye exhibits no discharge. No scleral icterus.   Neck: Normal range of motion. Neck supple. No tracheal deviation present. No thyromegaly present.   Cardiovascular: Normal rate, regular rhythm and normal heart sounds. Exam reveals no gallop and no friction rub.   No murmur heard.  Pulmonary/Chest: Effort normal and breath sounds normal. No respiratory distress. She has no wheezes. She has no rales. She exhibits no tenderness.   Abdominal: Soft. Bowel sounds are normal. She exhibits no distension and no mass. There is tenderness in the left lower quadrant. There is rebound and guarding. There is no rigidity.   Musculoskeletal: Normal range of motion. She exhibits no edema or tenderness.   Neurological: She is alert and oriented to person, place, and time.   Skin: Skin is warm and dry. No rash noted. No erythema. No pallor.   Psychiatric: She has  a normal mood and affect. Her behavior is normal.   Vitals reviewed.      Assessment:       1. Diverticulitis    2. Cloudy urine    3. Nephrostomy status        Plan:       1.  Cipro 500 mg b.i.d. times 10 days, Flagyl 500 mg t.i.d. times 10 days.  2/3.  Collect specimen from nephrostomy since this appears to be cloudier than normal along with abdominal pain.

## 2019-10-31 ENCOUNTER — TELEPHONE (OUTPATIENT)
Dept: UROLOGY | Facility: CLINIC | Age: 59
End: 2019-10-31

## 2019-10-31 ENCOUNTER — PATIENT OUTREACH (OUTPATIENT)
Dept: ADMINISTRATIVE | Facility: OTHER | Age: 59
End: 2019-10-31

## 2019-10-31 LAB — BACTERIA UR CULT: ABNORMAL

## 2019-10-31 NOTE — TELEPHONE ENCOUNTER
----- Message from Edelmira Wolfe sent at 10/31/2019  2:51 PM CDT -----  Contact: Pt#[phone]  Patient calling for sooner appointment than scheduled on 11/22 due to colostomy bag leaking. Please call

## 2019-11-01 ENCOUNTER — OFFICE VISIT (OUTPATIENT)
Dept: UROLOGY | Facility: CLINIC | Age: 59
End: 2019-11-01
Payer: MEDICARE

## 2019-11-01 ENCOUNTER — TELEPHONE (OUTPATIENT)
Dept: UROLOGY | Facility: CLINIC | Age: 59
End: 2019-11-01

## 2019-11-01 VITALS
DIASTOLIC BLOOD PRESSURE: 75 MMHG | SYSTOLIC BLOOD PRESSURE: 152 MMHG | BODY MASS INDEX: 22.96 KG/M2 | WEIGHT: 134.5 LBS | HEART RATE: 86 BPM | HEIGHT: 64 IN

## 2019-11-01 DIAGNOSIS — E88.09 SERUM ALBUMIN DECREASED: ICD-10-CM

## 2019-11-01 DIAGNOSIS — N18.2 TYPE 2 DIABETES MELLITUS WITH STAGE 2 CHRONIC KIDNEY DISEASE AND HYPERTENSION: ICD-10-CM

## 2019-11-01 DIAGNOSIS — Z93.6 NEPHROSTOMY STATUS: ICD-10-CM

## 2019-11-01 DIAGNOSIS — I12.9 TYPE 2 DIABETES MELLITUS WITH STAGE 2 CHRONIC KIDNEY DISEASE AND HYPERTENSION: ICD-10-CM

## 2019-11-01 DIAGNOSIS — S37.10XD LEFT URETERAL INJURY, SUBSEQUENT ENCOUNTER: Primary | ICD-10-CM

## 2019-11-01 DIAGNOSIS — E11.22 TYPE 2 DIABETES MELLITUS WITH STAGE 2 CHRONIC KIDNEY DISEASE AND HYPERTENSION: ICD-10-CM

## 2019-11-01 DIAGNOSIS — E87.6 HYPOKALEMIA: ICD-10-CM

## 2019-11-01 DIAGNOSIS — N13.5 OBSTRUCTION OF LEFT URETER: Primary | ICD-10-CM

## 2019-11-01 LAB — BACTERIA UR CULT: ABNORMAL

## 2019-11-01 PROCEDURE — 99999 PR PBB SHADOW E&M-EST. PATIENT-LVL IV: CPT | Mod: PBBFAC,,, | Performed by: UROLOGY

## 2019-11-01 PROCEDURE — 3008F PR BODY MASS INDEX (BMI) DOCUMENTED: ICD-10-PCS | Mod: CPTII,S$GLB,, | Performed by: UROLOGY

## 2019-11-01 PROCEDURE — 99999 PR PBB SHADOW E&M-EST. PATIENT-LVL IV: ICD-10-PCS | Mod: PBBFAC,,, | Performed by: UROLOGY

## 2019-11-01 PROCEDURE — 3008F BODY MASS INDEX DOCD: CPT | Mod: CPTII,S$GLB,, | Performed by: UROLOGY

## 2019-11-01 PROCEDURE — 3078F PR MOST RECENT DIASTOLIC BLOOD PRESSURE < 80 MM HG: ICD-10-PCS | Mod: CPTII,S$GLB,, | Performed by: UROLOGY

## 2019-11-01 PROCEDURE — 3078F DIAST BP <80 MM HG: CPT | Mod: CPTII,S$GLB,, | Performed by: UROLOGY

## 2019-11-01 PROCEDURE — 99215 PR OFFICE/OUTPT VISIT, EST, LEVL V, 40-54 MIN: ICD-10-PCS | Mod: S$GLB,,, | Performed by: UROLOGY

## 2019-11-01 PROCEDURE — 99215 OFFICE O/P EST HI 40 MIN: CPT | Mod: S$GLB,,, | Performed by: UROLOGY

## 2019-11-01 PROCEDURE — 3077F SYST BP >= 140 MM HG: CPT | Mod: CPTII,S$GLB,, | Performed by: UROLOGY

## 2019-11-01 PROCEDURE — 3077F PR MOST RECENT SYSTOLIC BLOOD PRESSURE >= 140 MM HG: ICD-10-PCS | Mod: CPTII,S$GLB,, | Performed by: UROLOGY

## 2019-11-01 RX ORDER — CEFADROXIL 1000 MG/1
TABLET ORAL
Refills: 3 | COMMUNITY
Start: 2019-09-23 | End: 2020-09-14

## 2019-11-01 NOTE — LETTER
November 1, 2019      Emy Young MD  [address]  [place] [address]           [facility] - Urology 4th Floor  [address]  [place] [address]  Phone: [phone]          Patient: Mariann Huff   MR Number: [id number]   YOB: 1960   Date of Visit: 11/1/2019       Dear Dr. Emy Young:    Thank you for referring Mariann Huff to me for evaluation. Attached you will find relevant portions of my assessment and plan of care.    If you have questions, please do not hesitate to call me. I look forward to following Mariann Huff along with you.    Sincerely,    Robin Boyd MD    Enclosure  CC:  No Recipients    If you would like to receive this communication electronically, please contact [email or web address] or [phone] to request more information on [employer] Link access.    For providers and/or their staff who would like to refer a patient to [facility], please contact us through our one-stop-shop provider referral line, [facility], at [phone].    If you feel you have received this communication in error or would no longer like to receive these types of communications, please e-mail [email or web address]

## 2019-11-01 NOTE — PROGRESS NOTES
CC: left nephrostomy tube following left ureteral injury. Last left neph tube changed on 10/7/19    Mariann Huff is a 59 y.o. woman who is here for the evaluation of left nephrostomy tube.  Hx of hypokalemia. Potasium supplement was given.  Had CT and is here for follow up.    She is still undergoing her rehab with strengthening of the lower LE.  Dr. George in neurosurgery pointed out that she made a good progress from her back surgery.  She can undergo urologic surgery if necessary.  We have been watching her physical recovery as well as nutritional status ( has had low albumin).  She walks with a cane.  She is here today with her .    Mariann Huff underwent left anterior L5-S1 interbody fusion who sustained a left ureteral transection on 4/12/19.  She underwent a ureteroureterostomy with stent placement at that time. She presented to the ER 4/20/19 with AMS, fever and respiratory distress. On imaging she was found to have a large amount of air and fluid within the retroperitoneal and within the left collecting system. This was not amenable to drainage by IR therefore she was taken to the OR for washout.     Procedure(s) Performed: 4/20/19  1. Exploratory laparotomy  2. Ligation of left ureter  3. Removal of left JJ ureteral stent    Findings:   - left retroperitoneum developed up to lower pole of the left kidney  - pockets of purulence identified and evacuated, cultures sent  - left ureteral anastomosis found to be completely broken down with stent clearly visible  - posterior to the ureter, spinal hardware was visibly exposed  - proximal end of the ureter clipped and stent removed as tissue felt to be unsuitable for repair  - no bowel injury noted    Subsequently she had left nephrostomy tube placed by IR on 4/20/19.  She was discharged from hospital after a long complicated course on 7/2/19.  She is here for urology follow up.  She reports that she has been able to void well thru the bladder as  well as via left nephrostomy tube.    The below is summary of her recent discharge notes.  history of Asthma, HTN, HLD, CAD (LAD GINGER proximal and distal 2013 and GINGER ostial LAD 2/2014), HFpEF, NSTEMI, T2DM, Obesity, Sleep Apnea, and back pain who presented to the ED on 6/6/19 with acute shortness of breath started the night before. Mrs. Huff has had two recent hospitalizations. 4/12/19-4/14/19 for L5-S1 OLIF with Neurosurgery with intraoperaative left ureteral injury with ureteroureteral anastomosis and ureteral stent placement. Second admission, 4/20/19-6/5/19 for intraabdominal abscess s/p washout and ligation of left ureter with nephrostomy tube placement on 4/21/19 and tracheostomy and PEG placement on 5/2/19.  Most recent hospitalization complicated by BUE and RLE DVT s/p IVC filter on 5/29/19 given concern for GIB with workup revealing rectal ulcer requiring pRBC transfusion. She was followed by ID during admission and is being treated with long term antibiotic therapy for E coli and Staph lugdenesis bacteremia with IV cefazolin and rifampin with end dated 6/18/19 given hardware.  She also completed 7 day course of cefepime for HAP (pseudomonas) on 5/25/19 and candida glabrata UTI that she received 7 days of high dose fluconazole.  She was discharged to rehab. She as admitted to ICU where she was found to have acute blood loss from rectal ulcer. Patient additionally developed NSTEMI from the anemia and pseudomonal hospital associated pneumonia. Imaging showed persisting fluid collection along the midline abdominal incision. IR aspirated fluid collection and cultures are pending. ID recommended continue meropenem until 6/15 and rifampin 300 mg BID and will require suppressive therapy indefinitely with cefadroxil and rifampin (due to hardware presence) once meropenem completed. She has intermittent back pain that is achy. Patient admitted to LTAC for IV antibiotics, ventilator weaning.  Patient completed  antimicrobial therapy for infective intra-abdominal abscess.  Tolerated ventilator weaning, trach Decanulated 6/19. Rafampin stopped on 6/19 second to nausea.  Patient's appetite improved dysphagia resolved nausea vomiting resolved rifampin resumed at 600 mg 3 times a week per Infectious Disease recommendations nausea did not return after resuming rifampin.  Patient advised to continue rifampin and cefadroxil indefinitely as suppressive therapy for possible hardware involvement.  Advised she will need a follow-up with Infectious Disease before stopping these medications.  She will also need follow-up with Urology for her left nephrostomy drain neurosurgery, in GI evaluate removing PEG tube.  Home health arranged with [facility].  DME ordered.  Patient advised inpatient rehab strongly recommended for weakness and debility status post recent fall last week.  Patient declined inpatient rehab adamant about returning home  in son to assist with patient care at home.  Discharge coordinated per , home health, and patient.  Patient provided with script for Norco for pain management until she can follow up with her primary care provider.      Hospital Course:     Acute on chronic respiratory failure with hypoxia, resolved   Hospital acquired pseudomonal pneumonia, resolved  S/P trach decannulation 6/19                 Continue scopalamine patch q3 days              Daily weights              Strict Is/Os              Lasix                    NSTEMI type II  CAD s/p stent  Essential HTN   Acute on chronic systolic and diastolic dysfunction, chronic, stable              ASA 81 gm daily              Atorvastatin 80 mg daily              Losartan 25 mg daily              Metoprolol 25 mg BID              Clopidogrel 75 mg daily due to recent NSTEMI              Risks factor modification,  Low salt diet, low-fat diet, exercise        Intraabdominal abscess   E. Coli and Staph  lugdenesis bacteremia 2/2 abdominal infection                 Possible hardware involvement id advises suppressive therapy                          Cefadroxil 1g po bid and rifampin 300mg bid                           6/26 Communicated to ID patient's nausea resolved, tolerating diet. ID will evaluate patient and              make recommendations   Continue cefadroxil and rifampin indefinitely for suppression          DM II with HTN and CKD II                 Accucheck and SSI              Diabetic diet              Monitor renal function              BP control              Diabetes control              Diet modification     Moderate protein malnutrition, improving  Dysphagia, resolved  S/p gastrostomy                 SLP recommended regular diet              Monitor intake              GI follow-up for PEG tube removal     Hypernatremia, resolved        Debility, improving daiily                 Pt/OT              Ambulates with walker        Anemia of chronic infection  Stable  Monitor          Vertebral Hardware Infection, stable  S/p L5-S1 OLIF on 4/12 complicated by L ureteral injury  S/p ureteroureteral anastomoses and ureteral stent placement  S/p L nephrostomy 4/21                 Brace whenever out of bed              PT/OT, progressing well, ambulates in her room with walker              Follow up with neurosurgery              Follow up with urology              Good UOP out of drain and normal urination             ID rec continue suppression with Cefadroxil 1g po bid              Rifampin three times per week dosing     Hematuria, resolved  6/24 urology notified of dark blood in drainage bag  Remains afebrile no leukocytosis hemoglobin stable, urinating in toilet, if reoccurs order UA         Rectal ulcer                  Noted on sigmoidoscopy 5/13 and healing on colonoscopy 5/24              Avoid fecal collection devices     History of Reggie upper and lower DVT              S/p ivc filter                Heparin q8h---- consider transitioning to oral anticoag                            Heparin discontinued    Past Medical History:   Diagnosis Date    Anticoagulant long-term use     Asthma     Back pain     Bradycardia, unspecified 2019    The etiology of the bradycardic episode is unclear.  The have appear to be respiratory in origin (at least the 1st episode).  We will continue to monitor carefully.  We are awaiting evaluation by Cardiology.      CAD (coronary artery disease)     s/p stentimg  (2), (1)    Carotid artery stenosis     Chronic combined systolic and diastolic CHF (congestive heart failure) 2019    Diabetes mellitus type 2 in obese     HTN (hypertension), benign     Hyperlipidemia     Keloid cicatrix     NPDR (nonproliferative diabetic retinopathy) 2015    NSTEMI (non-ST elevated myocardial infarction)     Nuclear sclerosis - Right Eye 3/18/2014    Primary localized osteoarthrosis, lower leg 2014    Sleep apnea      Past Surgical History:   Procedure Laterality Date    BRONCHOSCOPY N/A 2019    Procedure: BRONCHOSCOPY;  Surgeon: Sean Ruano MD;  Location: [facility] OR [address] [place] [address];  Service: General;  Laterality: N/A;    CARDIAC CATHETERIZATION      cataract extraction left eye      cataracts      CAUDAL EPIDURAL STEROID INJECTION N/A 2019    Procedure: Injection-steroid-epidural-caudal;  Surgeon: Dave Bentley Jr., MD;  Location: [facility] PAIN MGT;  Service: Pain Management;  Laterality: N/A;     SECTION, LOW TRANSVERSE      COLONOSCOPY N/A 2019    Procedure: COLONOSCOPY;  Surgeon: Al Alaniz MD;  Location: [facility] ENDO ([address] [place] [address]);  Service: Endoscopy;  Laterality: N/A;    CORONARY ANGIOPLASTY      ESOPHAGOGASTRODUODENOSCOPY W/ PEG  2019    Procedure: EGD, WITH PEG TUBE INSERTION;  Surgeon: Sean Ruano MD;  [facility];  Service: General;;    EXCISION TURBINATE, SUBMUCOUS      FLEXIBLE  SIGMOIDOSCOPY N/A 5/13/2019    Procedure: SIGMOIDOSCOPY, FLEXIBLE;  Surgeon: ALBERTO Amin MD;  Location: [facility] ENDO (2ND FLR);  Service: Endoscopy;  Laterality: N/A;    FLEXIBLE SIGMOIDOSCOPY N/A 5/21/2019    Procedure: SIGMOIDOSCOPY, FLEXIBLE;  Surgeon: ALBERTO Amin MD;  Location: [facility] ENDO (2ND FLR);  Service: Endoscopy;  Laterality: N/A;    FUSION OF LUMBAR SPINE BY ANTERIOR APPROACH Left 4/12/2019    Procedure: FUSION, SPINE, LUMBAR, ANTERIOR APPROACH Left L5-S1 Anterior to Psoa Interbody Fusion, L5-S1 Posterior Instrumentation;  Surgeon: Mk George MD;  Location: [address];  Service: Neurosurgery;  Laterality: Left;  Porcedure:  Left L5-S1 Anterior to Psoa Interbody Fusion, L5-S1 Posterior Instrumentation  Surgery Time: 4 Hrs  LOS: 2-3  Anesthesia: General   Blood: Type & Screen  R    HAND SURGERY Left     HAND SURGERY Right     torn ligament repair/ Dr. Yeboah    HYSTERECTOMY      left foot surgery      left wrist surgery      NASAL SEPTUM SURGERY  5/7/15    PERCUTANEOUS NEPHROSTOMY Left 4/21/2019    Procedure: Creation, Nephrostomy, Percutaneous;  Surgeon: Karina Surgeon;  Location[facility];  Service: Anesthesiology;  Laterality: Left;    REPAIR OF URETER  4/12/2019    Procedure: REPAIR, URETER;  Surgeon: Mk George MD;  Location: [address];  Service: Neurosurgery;;    REPLACEMENT OF NEPHROSTOMY TUBE N/A 7/18/2019    Procedure: REPLACEMENT, NEPHROSTOMY TUBE;  Surgeon: [facility] Diagnostic Provider;  [address]  Service: Anesthesiology;  Laterality: N/A;  188    REPLACEMENT OF NEPHROSTOMY TUBE N/A 7/24/2019    Procedure: REPLACEMENT, NEPHROSTOMY TUBE;  Surgeon: [facility] Diagnostic Provider;  [address]  Service: Anesthesiology;  Laterality: N/A;  188    REPLACEMENT OF NEPHROSTOMY TUBE N/A 10/7/2019    Procedure: REPLACEMENT, NEPHROSTOMY TUBE;  Surgeon: [facility] Diagnostic Provider;  Location: [address];  Service: Anesthesiology;   Laterality: N/A;  189    rt elbow surgery      S/P LAD COATED STENT  05/14/2010    6 total     S/P OM1 STENT  08/2003    SINUS SURGERY      F[place]S.S.    TRACHEOSTOMY N/A 5/2/2019    Procedure: CREATION, TRACHEOSTOMY;  Surgeon: Sean Ruano MD;  Location: [facility] OR [address] [place] [address];  Service: General;  Laterality: N/A;    TUBAL LIGATION       Social History     Tobacco Use    Smoking status: Never Smoker    Smokeless tobacco: Never Used   Substance Use Topics    Alcohol use: No     Alcohol/week: 0.0 standard drinks    Drug use: No     Family History   Problem Relation Age of Onset    Diabetes Mother     Heart disease Mother     Diabetes Father     Leukemia Father         leukemia    Heart attack Father     Diabetes Sister     Diabetes Brother     Diabetes Sister     No Known Problems Sister     No Known Problems Brother     No Known Problems Brother     No Known Problems Maternal Grandmother     No Known Problems Maternal Grandfather     No Known Problems Paternal Grandmother     No Known Problems Paternal Grandfather     No Known Problems Son     No Known Problems Son     No Known Problems Maternal Aunt     No Known Problems Maternal Uncle     No Known Problems Paternal Aunt     No Known Problems Paternal Uncle     Colon cancer Neg Hx     Inflammatory bowel disease Neg Hx     Melanoma Neg Hx     Psoriasis Neg Hx     Lupus Neg Hx     Eczema Neg Hx     Acne Neg Hx     Amblyopia Neg Hx     Blindness Neg Hx     Cancer Neg Hx     Cataracts Neg Hx     Glaucoma Neg Hx     Hypertension Neg Hx     Macular degeneration Neg Hx     Retinal detachment Neg Hx     Strabismus Neg Hx     Stroke Neg Hx     Thyroid disease Neg Hx     Heart failure Neg Hx     Hyperlipidemia Neg Hx      Allergy:  Review of patient's allergies indicates:   Allergen Reactions    Milk containing products      Causes GI distress     Outpatient Encounter Medications as of 11/1/2019   Medication Sig Dispense  Refill    ACCU-CHEK EDIN PLUS METER Misc       albuterol (PROVENTIL/VENTOLIN HFA) 90 mcg/actuation inhaler Inhale 2 puffs into the lungs every 6 (six) hours as needed for Wheezing. 1 each 11    albuterol-ipratropium (DUO-NEB) 2.5 mg-0.5 mg/3 mL nebulizer solution Inhale 3 mLs into the lungs.      amLODIPine (NORVASC) 10 MG tablet 10 mg by Tube route.      aspirin 81 mg Tab Take 81 mg by mouth. 1 Tablet Oral Every day      atorvastatin (LIPITOR) 80 MG tablet 1 tablet (80 mg total) by Per G Tube route once daily. 90 tablet 3    blood sugar diagnostic (ACCU-CHEK EDIN PLUS TEST STRP) Strp TEST BLOOD SUGARS 4 TIMES DAILY 150 strip 11    cefadroxil (DURICEF) 1 gram tablet TAKE 1 TABLET (1 G TOTAL) BY MOUTH 2 (TWO) TIMES DAILY.  3    celecoxib (CELEBREX) 200 MG capsule Take 1 capsule (200 mg total) by mouth 2 (two) times daily as needed for Pain. 60 capsule 2    clopidogrel (PLAVIX) 75 mg tablet Take 1 tablet (75 mg total) by mouth once daily. 30 tablet 3    escitalopram oxalate (LEXAPRO) 10 MG tablet Take 1 tablet (10 mg total) by mouth once daily. 30 tablet 11    famotidine (PEPCID) 20 MG tablet Take 1 tablet (20 mg total) by mouth 2 (two) times daily. 60 tablet 11    furosemide (LASIX) 40 MG tablet Take 1 tablet (40 mg total) by mouth once daily. 30 tablet 11    gabapentin (NEURONTIN) 300 MG capsule Take 1 capsule (300 mg total) by mouth 2 (two) times daily. 60 capsule 11    HYDROcodone-acetaminophen (NORCO) 5-325 mg per tablet Take 1 tablet by mouth every 6 (six) hours as needed for Pain. 28 tablet 0    insulin aspart U-100 (NOVOLOG) 100 unit/mL (3 mL) InPn pen Inject 1-10 Units into the skin 4 (four) times daily before meals and nightly. 12 mL 11    losartan (COZAAR) 50 MG tablet Take 1 tablet (50 mg total) by mouth once daily. 90 tablet 3    metoprolol tartrate (LOPRESSOR) 25 MG tablet Take 1 tablet (25 mg total) by mouth 2 (two) times daily. 60 tablet 11    metroNIDAZOLE (FLAGYL) 500 MG tablet  Take 1 tablet (500 mg total) by mouth 3 (three) times daily. for 10 days 30 tablet 0    multivitamin (THERAGRAN) per tablet Take 1 tablet by mouth once daily.      nitroGLYCERIN (NITROSTAT) 0.4 MG SL tablet Take one every 2-3 min till chestpain relief for 3 times and if still no relief, call MD or come to ED 30 tablet 11    ondansetron (ZOFRAN-ODT) 8 MG TbDL Take 1 tablet (8 mg total) by mouth every 12 (twelve) hours as needed. 30 tablet 2    potassium chloride SA (K-DUR,KLOR-CON) 20 MEQ tablet Take 1 tablet (20 mEq total) by mouth 2 (two) times daily. 60 tablet 11    rifAMpin (RIFADIN) 300 MG capsule       cephALEXin (KEFLEX) 500 MG capsule Take 1 capsule (500 mg total) by mouth every 8 (eight) hours. for 7 days (Patient not taking: Reported on 11/1/2019) 21 capsule 0    ciprofloxacin HCl (CIPRO) 500 MG tablet Take 1 tablet (500 mg total) by mouth every 12 (twelve) hours. for 10 days (Patient not taking: Reported on 11/1/2019) 20 tablet 0     Facility-Administered Encounter Medications as of 11/1/2019   Medication Dose Route Frequency Provider Last Rate Last Dose    lidocaine (PF) 10 mg/ml (1%) injection 10 mg  1 mL Intradermal Once Dave Bentley Jr., MD        lidocaine HCl 2% urojet   Urethral Once Robin Boyd MD         Review of Systems   ROS  Physical Exam     Vitals:    11/01/19 1042   BP: (!) 152/75   Pulse: 86     Physical Exam   Constitutional: She is oriented to person, place, and time. She appears well-developed and well-nourished. No distress.   On a wheel chair, walks with an assistance   HENT:   Head: Normocephalic and atraumatic.   Right Ear: External ear normal.   Left Ear: External ear normal.   Nose: Nose normal.   Eyes: Conjunctivae and EOM are normal. Pupils are equal, round, and reactive to light. No scleral icterus.   Neck: Normal range of motion. Neck supple. No JVD present. No tracheal deviation present. No thyromegaly present.   Cardiovascular: Normal rate, regular rhythm,  normal heart sounds and intact distal pulses.  Exam reveals no gallop and no friction rub.    No murmur heard.  Pulmonary/Chest: Effort normal and breath sounds normal. No respiratory distress. She has no wheezes.   Abdominal: Soft. Bowel sounds are normal. She exhibits no distension and no mass. There is no tenderness. There is no rebound and no guarding.   Genitourinary: No vaginal discharge found.   Genitourinary Comments: Left neph tube in place.  I changed the neph tube bag per pt's request today.  Sterile technique was used in replacing the drainage bag.   Musculoskeletal: Normal range of motion. She exhibits no edema, tenderness or deformity.   Lymphadenopathy:     She has no cervical adenopathy.   Neurological: She is alert and oriented to person, place, and time.   Skin: Skin is warm and dry. She is not diaphoretic.     Psychiatric: She has a normal mood and affect. Her behavior is normal. Thought content normal.       LABS:  Lab Results   Component Value Date    CREATININE 1.2 10/28/2019    CREATININE 1.44 (H) 10/28/2019    CREATININE 0.9 10/18/2019     Urine Culture, Routine   Date Value Ref Range Status   10/28/2019 ENTEROBACTER CLOACAE  >100,000 cfu/ml   (A)  Final     Radiology  CT 10/28/19  Left-sided percutaneous nephrostomy tube in place.  Moderate left-sided hydronephrosis, new when compared to prior.  Postoperative changes of left ureteral reconstruction with apparent upstream dilatation of the ureter beginning at the level of the surgical clips.    No evidence of focal intra-abdominal abscess.    Extensive atherosclerotic calcification.    Hepatomegaly.    Stable wedge-shaped hypodensities within the spleen, likely infarcts.    Assessment and Plan:  Diagnoses and all orders for this visit:    Left ureteral injury, subsequent encounter  -     Ambulatory Referral to Interventional Radiology  -     IR Nephrostomy Tube Change; Standing    Nephrostomy status  -     Ambulatory Referral to  Interventional Radiology  -     IR Nephrostomy Tube Change; Standing    Type 2 diabetes mellitus with stage 2 chronic kidney disease and hypertension  -     Comprehensive metabolic panel; Future    Hypokalemia  -     Comprehensive metabolic panel; Future    Serum albumin decreased    I reviewed her CT and explained her urologic problems in detail to pt and her .  Eventually we need to left psoas hitch or Boari flap or ileal ureter to connect left proximal ureter to the bladder.  She needs to fully recovered with better strength and better nutritional status ( albumin needs to be normalized).  Will plan intraop cystogram and left antegrade nephrostogram to determine what surgery may work for her in order to reconnect her left ureter back to the bladder.  Will plan change the left neph tube at the same time if possible.    Consider IMPACT Advanced Recover supplement prior to her surgery.    I spent 40 minutes with the patient of which more than half was spent in direct consultation with the patient in regards to our treatment and plan.    Follow-up:  Follow up in about 3 weeks (around 11/22/2019), or CMP.     Addendum:  Will plan intraop cystogram and simultaneous left antegrade nephrostogram to determine which surgery may be considered for urologic reconstruction on 12/11/19  Will request IR to replace left nephrostomy at the same time.

## 2019-11-02 LAB
BACTERIA BLD CULT: NORMAL
BACTERIA BLD CULT: NORMAL

## 2019-11-02 NOTE — TELEPHONE ENCOUNTER
Obstruction of left ureter  -     Case Request Operating Room: CYSTOGRAM INTRAOP, Nephrostogram - antegrade, REPLACEMENT, NEPHROSTOMY TUBE IR TO DO

## 2019-11-11 ENCOUNTER — CLINICAL SUPPORT (OUTPATIENT)
Dept: REHABILITATION | Facility: HOSPITAL | Age: 59
End: 2019-11-11
Attending: NEUROLOGICAL SURGERY
Payer: MEDICARE

## 2019-11-11 DIAGNOSIS — R26.89 POOR BALANCE: ICD-10-CM

## 2019-11-11 DIAGNOSIS — R29.898 WEAKNESS OF BOTH LOWER EXTREMITIES: ICD-10-CM

## 2019-11-11 PROBLEM — Z93.6 NEPHROSTOMY STATUS: Chronic | Status: ACTIVE | Noted: 2019-07-11

## 2019-11-11 PROBLEM — T83.022A NEPHROSTOMY TUBE DISPLACED: Chronic | Status: ACTIVE | Noted: 2019-07-18

## 2019-11-11 PROCEDURE — 97110 THERAPEUTIC EXERCISES: CPT | Mod: PO

## 2019-11-11 PROCEDURE — G8978 MOBILITY CURRENT STATUS: HCPCS | Mod: CK,PO

## 2019-11-11 PROCEDURE — G8979 MOBILITY GOAL STATUS: HCPCS | Mod: CK,PO

## 2019-11-11 PROCEDURE — 97163 PT EVAL HIGH COMPLEX 45 MIN: CPT | Mod: PO

## 2019-11-11 NOTE — PROGRESS NOTES
Subjective:       Patient ID: Mariann Huff is a 59 y.o. female.    Chief Complaint: Hospital Follow Up and Immunizations (Flu vacc)    HPI     CC: ED visit -     59 y.o.female presents today in f/u to ED visit  ED dx:  Fever, nephrostomy status, right lower quadrant abdominal pain, UTI associated with nephrostomy catheter  Presenting sx: She was sent from clinic.  Tests done:   Lab:  Blood cultures are no growth after 5 days.  CBC stable, procalcitonin elevated, i-STAT creatinine 1.3, urine culture shows Enterobacter, urinalysis positive for infection, lactic acid normal   Imaging:  EKG shows sinus tachycardia; CT abdomen pelvis with contrast shows moderate left-sided hydronephrosis new compared to prior, postop changes of left ureteral reconstruction with apparent upstream dilation of the ureter being at the level of surgical clips.  No evidence of abscess intra-abdominal.  Stable wedge-shaped hypodensities within the spleen that are likely infarcts.  Chest x-ray shows no acute findings.  Disposition:  Home with Keflex 500 mg every 8 hr for 7 days.  She was changed to bactrim, which she has finished.    Post ED:  Post emergency room, has been followed up by Urology.  She is seeing  Urology on the 22nd and might have the nephrostomy tube removed.    Review of Systems    Objective:      Physical Exam   Constitutional: She is oriented to person, place, and time. She appears well-developed and well-nourished.   HENT:   Head: Normocephalic and atraumatic.   Mouth/Throat: No oropharyngeal exudate.   Eyes: Pupils are equal, round, and reactive to light. EOM are normal. Right eye exhibits no discharge. Left eye exhibits no discharge. No scleral icterus.   Neck: Normal range of motion. Neck supple. No tracheal deviation present. No thyromegaly present.   Cardiovascular: Normal rate, regular rhythm and normal heart sounds. Exam reveals no gallop and no friction rub.   No murmur heard.  Pulmonary/Chest: Effort normal and  breath sounds normal. No respiratory distress. She has no wheezes. She has no rales. She exhibits no tenderness.   Abdominal: Soft. Bowel sounds are normal. She exhibits no distension and no mass. There is no tenderness. There is no rebound and no guarding.   Musculoskeletal: Normal range of motion. She exhibits no edema or tenderness.   Neurological: She is alert and oriented to person, place, and time.   Skin: Skin is warm and dry. No rash noted. No erythema. No pallor.   Psychiatric: She has a normal mood and affect. Her behavior is normal.   Vitals reviewed.      Assessment:       1. Nephrostomy status    2. Hydronephrosis, unspecified hydronephrosis type    3. Acute cystitis without hematuria    4. Hypokalemia        Plan:       1/2/3.  Has completed course of Bactrim.  Has follow-up with Urology.  Planning on seeing Urology on November 22nd.  May get nephrostomy tube out according to patient at this time.  4.  Check CMP, magnesium.

## 2019-11-11 NOTE — PLAN OF CARE
[facility]  Physical Therapy Initial Evaluation    Name: Mariann Huff  [facility] Number: [id number]    Therapy Diagnosis:   Encounter Diagnoses   Name Primary?    Weakness of both lower extremities     Poor balance      Physician: Mk George MD    Physician Orders: PT Eval and Treat   Medical Diagnosis from Referral:   Z98.1 (ICD-10-CM) - S/P lumbar fusion   M53.3 (ICD-10-CM) - Sacroiliac joint dysfunction of both sides   Evaluation Date: 11/11/2019  Authorization Period Expiration: 10/17/2020  Plan of Care Expiration: 01/11/2019  Visit # / Visits authorized: 1/1    Time In: 9:00 am   Time Out: 10:00 am  Total Billable Time: 60 minutes    Precautions: Standard, Diabetes, Fall and CHF    Subjective   Date of onset: Chronic - lumbar fusion April 12, 2019   History of current condition - Mariann reports: she has a long standing history with LBP, in which MD corrected originally with at lumbar fusion from L5-S1. Patent states following the fusion she ran into complications which caused her condition to worsen. Patient states she was in the hospital for 3-4 months, but eventually was discharged home. Since discharge, patient states she has been participating in home health therapy for 4/5 weeks. Patient states after finishing PT she contracted an infection that required hospitalization for a few days. Since patient states she has been antibiotics that has been helping. Patient states since her first round of PT she has not had any falls or incidents. Patient states she returned to PT because she is still feeling weak and experience some intermittent pain that varies in intensity. Patient states she continues to take prescribed pain medication as needed, however pain is still there. Patient denies using ice or heat for pain management. Patient denies any numbness or tingling traveling down (B) LE. Patient states she has difficulty performing her ADLs  Such as cooking, cleaning, and  shopping. Patient also has difficulty with prolonged sitting, standing, and sitting.        Medical History:   Past Medical History:   Diagnosis Date    Anticoagulant long-term use     Asthma     Back pain     Bradycardia, unspecified 2019    The etiology of the bradycardic episode is unclear.  The have appear to be respiratory in origin (at least the 1st episode).  We will continue to monitor carefully.  We are awaiting evaluation by Cardiology.      CAD (coronary artery disease)     s/p stentimg  (2), (1)    Carotid artery stenosis     Chronic combined systolic and diastolic CHF (congestive heart failure) 2019    Diabetes mellitus type 2 in obese     HTN (hypertension), benign     Hyperlipidemia     Keloid cicatrix     NPDR (nonproliferative diabetic retinopathy) 2015    NSTEMI (non-ST elevated myocardial infarction)     Nuclear sclerosis - Right Eye 3/18/2014    Primary localized osteoarthrosis, lower leg 2014    Sleep apnea        Surgical History:   Mariann Huff  has a past surgical history that includes left foot surgery; left wrist surgery; rt elbow surgery;  section, low transverse; Tubal ligation; cataract extraction left eye; cataracts; S/P OM1 STENT (2003); S/P LAD COATED STENT (2010); Cardiac catheterization; Coronary angioplasty; Hysterectomy; Hand surgery (Left); Excision turbinate, submucous; Sinus surgery; Nasal septum surgery (5/7/15); Hand surgery (Right); Caudal epidural steroid injection (N/A, 2019); Fusion of lumbar spine by anterior approach (Left, 2019); Repair of ureter (2019); Percutaneous nephrostomy (Left, 2019); Tracheostomy (N/A, 2019); Bronchoscopy (N/A, 2019); Esophagogastroduodenoscopy w/ PEG (2019); Flexible sigmoidoscopy (N/A, 2019); Flexible sigmoidoscopy (N/A, 2019); Colonoscopy (N/A, 2019); Replacement of nephrostomy tube (N/A, 2019); Replacement of nephrostomy tube  (N/A, 7/24/2019); and Replacement of nephrostomy tube (N/A, 10/7/2019).    Medications:   Mariann has a current medication list which includes the following prescription(s): accu-chek thao plus meter, albuterol, albuterol-ipratropium, amlodipine, aspirin, atorvastatin, blood sugar diagnostic, cefadroxil, celecoxib, clopidogrel, escitalopram oxalate, famotidine, furosemide, gabapentin, hydrocodone-acetaminophen, insulin aspart u-100, losartan, metoprolol tartrate, multivitamin, nitroglycerin, ondansetron, potassium chloride sa, and rifampin, and the following Facility-Administered Medications: lidocaine (pf) 10 mg/ml (1%) and lidocaine hcl 2%.    Allergies:   Review of patient's allergies indicates:   Allergen Reactions    Milk containing products      Causes GI distress        Imaging, none:     Prior Therapy: yes for current condition   Social History: single-story home lives with their family  Occupation: retired  Prior Level of Function: modified independent with pain   Current Level of Function: independent with pain     Pain:  Current 5/10, worst 9/10, best 0/10   Location: bilateral back   Description: Aching  Aggravating Factors: Sitting, Standing, Bending, Walking, Flexing and Getting out of bed/chair  Easing Factors: pain medication and rest    Pts goals: increase strength     Objective     Observation: Patient ambulated into the clinic with RW with normal gait pattern      Posture:  Slightly rounded shoulders, loss of lumbar lordosis      Lumbar Range of Motion:     Degrees Pain   Flexion 15    pain         Extension 7    Pain          Left Side Bending 10 No pain          Right Side Bending 10 No Pain             Lower Extremity Strength  Right LE   Left LE     Knee extension: 5/5 Knee extension: 5/5   Knee flexion: 4+/5 Knee flexion: 4+/5   Hip flexion: 4+/5 Hip flexion: 4+/5   Hip extension:  3+/5 Hip extension: 4/5   Hip abduction: 4/5 Hip abduction: 4/5    Hip adduction: 4/5 Hip adduction 4/5    Ankle dorsiflexion: 3+/5 Ankle dorsiflexion: 5/5            Special Tests:  -Repeated Flexion: pain   -Repeated Ext: pain worse than flexion   -Bridge Test: able to perform through partial ROM with onset of pain         Joint Mobility: NT      Palpation: TTP across low back including (B) QL to moderate palpation      Sensation: light touch intact      SL Balance: even surface R: 3 seconds; L = 3 seconds       CMS Impairment/Limitation/Restriction for FOTO Lumbar Survey    Therapist reviewed FOTO scores for Mariann Huff on 11/11/2019.   FOTO documents entered into [facility] - see Media section.    Limitation Score: 58%  Category: Mobility    Current : CK = at least 40% but < 60% impaired, limited or restricted  Goal: CK = at least 40% but < 60% impaired, limited or restricted           TREATMENT   Treatment Time In: 9:45 am  Treatment Time Out: 9:55 am   Total Treatment time separate from Evaluation: 10 minutes    Mariann received therapeutic exercises to develop strength, endurance, ROM, flexibility, posture and core stabilization for 10 minutes including: HEP Review and Development         Date  11/11/19   VISIT 1/--   G CODE VISIT 1/10  12/11/19   POC EXP. DATE 01/11/19   VISIT AMOUNT  MEDICARE TOTAL 113.20   FACE-TO-FACE 12/11/19         TABLE:     HSS w/ strap --   Bridge  w/ball   LTR --   SAQ --   SLR --   Hip abduction SL    Hip adduction SL         SEATED:     LAQs --   Seated Hip Flex --   HS Curl     Sit to stand w/o UE --         STANDING:     Mini squats On foam    Hip ABD YTB   Hip Ext  YTB   Side Stepping w/band    Tandem Stance  --   Lateral Step Ups L2    Standing Lat Pull Downs  RTB         Initials BJ       Home Exercises and Patient Education Provided    Education provided:   - perform HEP in pain-free range   - patient/PT roles and responsibilities     Written Home Exercises Provided: yes.  Exercises were reviewed and Mariann was able to demonstrate them prior to the end of the session.   Mariann demonstrated good  understanding of the education provided.     See EMR under Patient Instructions for exercises provided 11/11/2019.    Assessment   Mariann is a 59 y.o. female referred to outpatient Physical Therapy with a medical diagnosis of lumbar fusion. Pt presents with decreased strength, balance, ambulation ability, endurance, and overall functional mobility. Patient demonstrates mild difficulty with bed mobility, however some difficulty can be attributed to having tubes places in her abdominal and lumbar areas making certain position uncomfortable. Patient demonstrated decreased marina with no sway during ambulatin. Patient demonstrated mild strength deficits with no pain during MMT testing. Gross lumbar AROM improved as compared to first visit, with minimal pain. Most pain was experienced with extension. Patient is currently at least 40% but < 60% impaired, limited or restricted.     Pt prognosis is Good.   Pt will benefit from skilled outpatient Physical Therapy to address the deficits stated above and in the chart below, provide pt/family education, and to maximize pt's level of independence.     Plan of care discussed with patient: Yes  Pt's spiritual, cultural and educational needs considered and patient is agreeable to the plan of care and goals as stated below:     Anticipated Barriers for therapy: none    Medical Necessity is demonstrated by the following  History  Co-morbidities and personal factors that may impact the plan of care Co-morbidities:   Back pain, Congestive Heart Failure or Heart Disease, Diabetes Type I or II, High Blood    Personal Factors:   NG Tube placement      high   Examination  Body Structures and Functions, activity limitations and participation restrictions that may impact the plan of care Body Regions:   back  lower extremities    Body Systems:    ROM  strength  balance  gait    Participation Restrictions:   None     Activity limitations:   Learning and  applying knowledge  no deficits    General Tasks and Commands  no deficits    Communication  no deficits    Mobility  lifting and carrying objects  walking  moving around using equipment (WC)  using transportation (bus, train, plane, car)    Self care  washing oneself (bathing, drying, washing hands)  toileting  dressing    Domestic Life  shopping  cooking  doing house work (cleaning house, washing dishes, laundry)    Interactions/Relationships  no deficits    Life Areas  no deficits    Community and Social Life  community life  recreation and leisure         high   Clinical Presentation unstable clinical presentation with unpredictable characteristics high   Decision Making/ Complexity Score: high     Goals:  Short Term Goals: 4 weeks   Patient will be able to stand for 15-30 minutes consecutively with min-no pain/difficulty in order to prepare full meal.  Patient will be able to ambulate for 15-30 minutes consecutively with min-no pain/difficulty in order grocery shop.    Patient will be able to sit for 30 minutes - 1 hour consecutively with min-no pain/difficulty in order to watch one TV Shop with family.    Long Term Goals: 8 weeks   Patient will be able to stand for 30 minutes-1 hour consecutively with min-no pain/difficulty in order to prepare full meal.   Patient will be able to ambulate 1 hour consecutively with min-no pain/difficulty in order grocery shop.    Patient will be able to sit for 1-2 hours consecutively with min-no pain/difficulty in order to watch one TV Shop with family.    Plan   Plan of care Certification: 11/11/2019 to 01/11/2019.    Outpatient Physical Therapy 2 times weekly for 8 weeks to include the following interventions: Manual Therapy, Moist Heat/ Ice, Neuromuscular Re-ed, Patient Education and Therapeutic Exercise.     Mallory Dunne, PT, DPT

## 2019-11-11 NOTE — PATIENT INSTRUCTIONS
Lumbar Rotation    Feet on floor, slowly rock knees from side to side in small, pain-free range of motion. Allow lower back to rotate slightly, but keep shoulders flat on ground. Hold 2 seconds.  Repeat 10 times per set. Do 1 sets per session. Do 2 sessions per day.      Strengthening: Straight Leg Raise (Phase 1)        Tighten muscles on front of right thigh, then lift leg from surface, keeping knee locked.   Repeat 10 times per set. Do 1 sets per session. Do 2 sessions per day.     [email or web address]        Knee  (LAQ)        Sit up tall, tighten abdominals. Straighten one leg and then relax it. Repeat with other leg.  Repeat 10 times. Do 2 sessions per day.     [email or web address]     Seated Marching    Sit, both feet flat, tighten abdominals. Lift right knee toward ceiling. Lower and lift left knee toward the ceiling. Repeat, alternating sides.  Complete 1 sets of 10 repetitions. Perform 2 sessions per day.

## 2019-11-13 ENCOUNTER — OFFICE VISIT (OUTPATIENT)
Dept: INTERNAL MEDICINE | Facility: CLINIC | Age: 59
End: 2019-11-13
Payer: MEDICARE

## 2019-11-13 ENCOUNTER — LAB VISIT (OUTPATIENT)
Dept: LAB | Facility: HOSPITAL | Age: 59
End: 2019-11-13
Attending: INTERNAL MEDICINE
Payer: MEDICARE

## 2019-11-13 VITALS
HEIGHT: 64 IN | BODY MASS INDEX: 24.69 KG/M2 | RESPIRATION RATE: 18 BRPM | WEIGHT: 144.63 LBS | SYSTOLIC BLOOD PRESSURE: 130 MMHG | HEART RATE: 78 BPM | DIASTOLIC BLOOD PRESSURE: 70 MMHG | TEMPERATURE: 98 F

## 2019-11-13 DIAGNOSIS — Z93.6 NEPHROSTOMY STATUS: Chronic | ICD-10-CM

## 2019-11-13 DIAGNOSIS — N30.00 ACUTE CYSTITIS WITHOUT HEMATURIA: ICD-10-CM

## 2019-11-13 DIAGNOSIS — Z93.6 NEPHROSTOMY STATUS: Primary | Chronic | ICD-10-CM

## 2019-11-13 DIAGNOSIS — N13.30 HYDRONEPHROSIS, UNSPECIFIED HYDRONEPHROSIS TYPE: ICD-10-CM

## 2019-11-13 DIAGNOSIS — E87.6 HYPOKALEMIA: ICD-10-CM

## 2019-11-13 LAB
ALBUMIN SERPL BCP-MCNC: 3.7 G/DL (ref 3.5–5.2)
ALP SERPL-CCNC: 112 U/L (ref 55–135)
ALT SERPL W/O P-5'-P-CCNC: 26 U/L (ref 10–44)
ANION GAP SERPL CALC-SCNC: 16 MMOL/L (ref 8–16)
AST SERPL-CCNC: 22 U/L (ref 10–40)
BILIRUB SERPL-MCNC: 0.6 MG/DL (ref 0.1–1)
BUN SERPL-MCNC: 26 MG/DL (ref 6–20)
CALCIUM SERPL-MCNC: 10.9 MG/DL (ref 8.7–10.5)
CHLORIDE SERPL-SCNC: 102 MMOL/L (ref 95–110)
CO2 SERPL-SCNC: 24 MMOL/L (ref 23–29)
CREAT SERPL-MCNC: 1 MG/DL (ref 0.5–1.4)
EST. GFR  (AFRICAN AMERICAN): >60 ML/MIN/1.73 M^2
EST. GFR  (NON AFRICAN AMERICAN): >60 ML/MIN/1.73 M^2
GLUCOSE SERPL-MCNC: 196 MG/DL (ref 70–110)
MAGNESIUM SERPL-MCNC: 1.9 MG/DL (ref 1.6–2.6)
POTASSIUM SERPL-SCNC: 5.1 MMOL/L (ref 3.5–5.1)
PROCALCITONIN SERPL IA-MCNC: 0.13 NG/ML
PROT SERPL-MCNC: 8.8 G/DL (ref 6–8.4)
SODIUM SERPL-SCNC: 142 MMOL/L (ref 136–145)

## 2019-11-13 PROCEDURE — G0008 FLU VACCINE (QUAD) GREATER THAN OR EQUAL TO 3YO PRESERVATIVE FREE IM: ICD-10-PCS | Mod: S$GLB,,, | Performed by: INTERNAL MEDICINE

## 2019-11-13 PROCEDURE — 90686 FLU VACCINE (QUAD) GREATER THAN OR EQUAL TO 3YO PRESERVATIVE FREE IM: ICD-10-PCS | Mod: S$GLB,,, | Performed by: INTERNAL MEDICINE

## 2019-11-13 PROCEDURE — 36415 COLL VENOUS BLD VENIPUNCTURE: CPT | Mod: PO

## 2019-11-13 PROCEDURE — 99999 PR PBB SHADOW E&M-EST. PATIENT-LVL III: ICD-10-PCS | Mod: PBBFAC,,, | Performed by: INTERNAL MEDICINE

## 2019-11-13 PROCEDURE — 83735 ASSAY OF MAGNESIUM: CPT

## 2019-11-13 PROCEDURE — 3008F PR BODY MASS INDEX (BMI) DOCUMENTED: ICD-10-PCS | Mod: CPTII,S$GLB,, | Performed by: INTERNAL MEDICINE

## 2019-11-13 PROCEDURE — 3075F PR MOST RECENT SYSTOLIC BLOOD PRESS GE 130-139MM HG: ICD-10-PCS | Mod: CPTII,S$GLB,, | Performed by: INTERNAL MEDICINE

## 2019-11-13 PROCEDURE — 99999 PR PBB SHADOW E&M-EST. PATIENT-LVL III: CPT | Mod: PBBFAC,,, | Performed by: INTERNAL MEDICINE

## 2019-11-13 PROCEDURE — 84145 PROCALCITONIN (PCT): CPT

## 2019-11-13 PROCEDURE — G0008 ADMIN INFLUENZA VIRUS VAC: HCPCS | Mod: S$GLB,,, | Performed by: INTERNAL MEDICINE

## 2019-11-13 PROCEDURE — 3078F DIAST BP <80 MM HG: CPT | Mod: CPTII,S$GLB,, | Performed by: INTERNAL MEDICINE

## 2019-11-13 PROCEDURE — 99214 OFFICE O/P EST MOD 30 MIN: CPT | Mod: 25,S$GLB,, | Performed by: INTERNAL MEDICINE

## 2019-11-13 PROCEDURE — 90686 IIV4 VACC NO PRSV 0.5 ML IM: CPT | Mod: S$GLB,,, | Performed by: INTERNAL MEDICINE

## 2019-11-13 PROCEDURE — 80053 COMPREHEN METABOLIC PANEL: CPT

## 2019-11-13 PROCEDURE — 99214 PR OFFICE/OUTPT VISIT, EST, LEVL IV, 30-39 MIN: ICD-10-PCS | Mod: 25,S$GLB,, | Performed by: INTERNAL MEDICINE

## 2019-11-13 PROCEDURE — 3008F BODY MASS INDEX DOCD: CPT | Mod: CPTII,S$GLB,, | Performed by: INTERNAL MEDICINE

## 2019-11-13 PROCEDURE — 3075F SYST BP GE 130 - 139MM HG: CPT | Mod: CPTII,S$GLB,, | Performed by: INTERNAL MEDICINE

## 2019-11-13 PROCEDURE — 3078F PR MOST RECENT DIASTOLIC BLOOD PRESSURE < 80 MM HG: ICD-10-PCS | Mod: CPTII,S$GLB,, | Performed by: INTERNAL MEDICINE

## 2019-11-13 RX ORDER — ZOLPIDEM TARTRATE 5 MG/1
5 TABLET ORAL NIGHTLY PRN
Qty: 30 TABLET | Refills: 2 | Status: SHIPPED | OUTPATIENT
Start: 2019-11-13 | End: 2020-09-14

## 2019-11-13 RX ORDER — CLOPIDOGREL BISULFATE 75 MG/1
75 TABLET ORAL DAILY
Qty: 90 TABLET | Refills: 3 | Status: SHIPPED | OUTPATIENT
Start: 2019-11-13 | End: 2020-09-08

## 2019-11-14 ENCOUNTER — TELEPHONE (OUTPATIENT)
Dept: INTERNAL MEDICINE | Facility: CLINIC | Age: 59
End: 2019-11-14

## 2019-11-14 NOTE — TELEPHONE ENCOUNTER
----- Message from Jasbir Haney MD sent at 11/14/2019  6:56 AM CST -----  Electrolytes, kidney/liver function, magnesium are normal.  Procalcitonin is negative, indicating that the infection has been treated successfully.

## 2019-11-19 ENCOUNTER — PATIENT OUTREACH (OUTPATIENT)
Dept: ADMINISTRATIVE | Facility: OTHER | Age: 59
End: 2019-11-19

## 2019-11-21 ENCOUNTER — LAB VISIT (OUTPATIENT)
Dept: LAB | Facility: HOSPITAL | Age: 59
End: 2019-11-21
Attending: UROLOGY
Payer: MEDICARE

## 2019-11-21 DIAGNOSIS — I12.9 TYPE 2 DIABETES MELLITUS WITH STAGE 2 CHRONIC KIDNEY DISEASE AND HYPERTENSION: ICD-10-CM

## 2019-11-21 DIAGNOSIS — E11.22 TYPE 2 DIABETES MELLITUS WITH STAGE 2 CHRONIC KIDNEY DISEASE AND HYPERTENSION: ICD-10-CM

## 2019-11-21 DIAGNOSIS — N18.2 TYPE 2 DIABETES MELLITUS WITH STAGE 2 CHRONIC KIDNEY DISEASE AND HYPERTENSION: ICD-10-CM

## 2019-11-21 DIAGNOSIS — E87.6 HYPOKALEMIA: ICD-10-CM

## 2019-11-21 LAB
ALBUMIN SERPL BCP-MCNC: 3.6 G/DL (ref 3.5–5.2)
ALP SERPL-CCNC: 102 U/L (ref 55–135)
ALT SERPL W/O P-5'-P-CCNC: 16 U/L (ref 10–44)
ANION GAP SERPL CALC-SCNC: 14 MMOL/L (ref 8–16)
AST SERPL-CCNC: 16 U/L (ref 10–40)
BILIRUB SERPL-MCNC: 0.6 MG/DL (ref 0.1–1)
BUN SERPL-MCNC: 33 MG/DL (ref 6–20)
CALCIUM SERPL-MCNC: 10.4 MG/DL (ref 8.7–10.5)
CHLORIDE SERPL-SCNC: 103 MMOL/L (ref 95–110)
CO2 SERPL-SCNC: 24 MMOL/L (ref 23–29)
CREAT SERPL-MCNC: 1.2 MG/DL (ref 0.5–1.4)
EST. GFR  (AFRICAN AMERICAN): 57.6 ML/MIN/1.73 M^2
EST. GFR  (NON AFRICAN AMERICAN): 49.9 ML/MIN/1.73 M^2
GLUCOSE SERPL-MCNC: 191 MG/DL (ref 70–110)
POTASSIUM SERPL-SCNC: 5.1 MMOL/L (ref 3.5–5.1)
PROT SERPL-MCNC: 8.3 G/DL (ref 6–8.4)
SODIUM SERPL-SCNC: 141 MMOL/L (ref 136–145)

## 2019-11-21 PROCEDURE — 80053 COMPREHEN METABOLIC PANEL: CPT

## 2019-11-21 PROCEDURE — 36415 COLL VENOUS BLD VENIPUNCTURE: CPT | Mod: PO

## 2019-11-22 ENCOUNTER — OFFICE VISIT (OUTPATIENT)
Dept: UROLOGY | Facility: CLINIC | Age: 59
End: 2019-11-22
Payer: MEDICARE

## 2019-11-22 VITALS
HEIGHT: 64 IN | WEIGHT: 141.13 LBS | DIASTOLIC BLOOD PRESSURE: 62 MMHG | HEART RATE: 85 BPM | BODY MASS INDEX: 24.1 KG/M2 | SYSTOLIC BLOOD PRESSURE: 113 MMHG

## 2019-11-22 DIAGNOSIS — S37.10XD LEFT URETERAL INJURY, SUBSEQUENT ENCOUNTER: Primary | ICD-10-CM

## 2019-11-22 DIAGNOSIS — Z93.6 NEPHROSTOMY STATUS: Chronic | ICD-10-CM

## 2019-11-22 DIAGNOSIS — N12 PYELONEPHRITIS OF LEFT KIDNEY: Primary | ICD-10-CM

## 2019-11-22 DIAGNOSIS — Z93.6 NEPHROSTOMY STATUS: ICD-10-CM

## 2019-11-22 DIAGNOSIS — S37.10XD LEFT URETERAL INJURY, SUBSEQUENT ENCOUNTER: ICD-10-CM

## 2019-11-22 PROCEDURE — 99215 OFFICE O/P EST HI 40 MIN: CPT | Mod: 57,S$GLB,, | Performed by: UROLOGY

## 2019-11-22 PROCEDURE — 3074F PR MOST RECENT SYSTOLIC BLOOD PRESSURE < 130 MM HG: ICD-10-PCS | Mod: CPTII,S$GLB,, | Performed by: UROLOGY

## 2019-11-22 PROCEDURE — 3008F PR BODY MASS INDEX (BMI) DOCUMENTED: ICD-10-PCS | Mod: CPTII,S$GLB,, | Performed by: UROLOGY

## 2019-11-22 PROCEDURE — 99999 PR PBB SHADOW E&M-EST. PATIENT-LVL V: ICD-10-PCS | Mod: PBBFAC,,, | Performed by: UROLOGY

## 2019-11-22 PROCEDURE — 99999 PR PBB SHADOW E&M-EST. PATIENT-LVL V: CPT | Mod: PBBFAC,,, | Performed by: UROLOGY

## 2019-11-22 PROCEDURE — 87086 URINE CULTURE/COLONY COUNT: CPT

## 2019-11-22 PROCEDURE — 99215 PR OFFICE/OUTPT VISIT, EST, LEVL V, 40-54 MIN: ICD-10-PCS | Mod: 57,S$GLB,, | Performed by: UROLOGY

## 2019-11-22 PROCEDURE — 3078F DIAST BP <80 MM HG: CPT | Mod: CPTII,S$GLB,, | Performed by: UROLOGY

## 2019-11-22 PROCEDURE — 3078F PR MOST RECENT DIASTOLIC BLOOD PRESSURE < 80 MM HG: ICD-10-PCS | Mod: CPTII,S$GLB,, | Performed by: UROLOGY

## 2019-11-22 PROCEDURE — 3074F SYST BP LT 130 MM HG: CPT | Mod: CPTII,S$GLB,, | Performed by: UROLOGY

## 2019-11-22 PROCEDURE — 3008F BODY MASS INDEX DOCD: CPT | Mod: CPTII,S$GLB,, | Performed by: UROLOGY

## 2019-11-22 NOTE — PROGRESS NOTES
CC: left nephrostomy tube following left ureteral injury. Last left neph tube changed on 10/7/19, c/o urine leak at the neph tube connection    Mariann Huff is a 59 y.o. woman who is here for the evaluation of left nephrostomy tube.  She has been doing well. Got anew cane and has been able to walk much better.  C/o urine leakage at the neph tube connection.  C/o cloudy urine.  Denies any fever or chills.    She is still undergoing her rehab with strengthening of the lower LE.  Dr. George in neurosurgery pointed out that she made a good progress from her back surgery.  She can undergo urologic surgery if necessary.  We have been watching her physical recovery as well as nutritional status ( has had low albumin).  She walks with a cane.  She is here today with her .    Mariann Huff underwent left anterior L5-S1 interbody fusion who sustained a left ureteral transection on 4/12/19.  She underwent a ureteroureterostomy with stent placement at that time. She presented to the ER 4/20/19 with AMS, fever and respiratory distress. On imaging she was found to have a large amount of air and fluid within the retroperitoneal and within the left collecting system. This was not amenable to drainage by IR therefore she was taken to the OR for washout.     Procedure(s) Performed: 4/20/19  1. Exploratory laparotomy  2. Ligation of left ureter  3. Removal of left JJ ureteral stent    Findings:   - left retroperitoneum developed up to lower pole of the left kidney  - pockets of purulence identified and evacuated, cultures sent  - left ureteral anastomosis found to be completely broken down with stent clearly visible  - posterior to the ureter, spinal hardware was visibly exposed  - proximal end of the ureter clipped and stent removed as tissue felt to be unsuitable for repair  - no bowel injury noted    Subsequently she had left nephrostomy tube placed by IR on 4/20/19.  She was discharged from hospital after a long  complicated course on 7/2/19.  She is here for urology follow up.  She reports that she has been able to void well thru the bladder as well as via left nephrostomy tube.    The below is summary of her previous discharge notes.  history of Asthma, HTN, HLD, CAD (LAD GINGER proximal and distal 2013 and GINGER ostial LAD 2/2014), HFpEF, NSTEMI, T2DM, Obesity, Sleep Apnea, and back pain who presented to the ED on 6/6/19 with acute shortness of breath started the night before. Mrs. Huff has had two recent hospitalizations. 4/12/19-4/14/19 for L5-S1 OLIF with Neurosurgery with intraoperaative left ureteral injury with ureteroureteral anastomosis and ureteral stent placement. Second admission, 4/20/19-6/5/19 for intraabdominal abscess s/p washout and ligation of left ureter with nephrostomy tube placement on 4/21/19 and tracheostomy and PEG placement on 5/2/19.  Most recent hospitalization complicated by BUE and RLE DVT s/p IVC filter on 5/29/19 given concern for GIB with workup revealing rectal ulcer requiring pRBC transfusion. She was followed by ID during admission and is being treated with long term antibiotic therapy for E coli and Staph lugdenesis bacteremia with IV cefazolin and rifampin with end dated 6/18/19 given hardware.  She also completed 7 day course of cefepime for HAP (pseudomonas) on 5/25/19 and candida glabrata UTI that she received 7 days of high dose fluconazole.  She was discharged to rehab. She as admitted to ICU where she was found to have acute blood loss from rectal ulcer. Patient additionally developed NSTEMI from the anemia and pseudomonal hospital associated pneumonia. Imaging showed persisting fluid collection along the midline abdominal incision. IR aspirated fluid collection and cultures are pending. ID recommended continue meropenem until 6/15 and rifampin 300 mg BID and will require suppressive therapy indefinitely with cefadroxil and rifampin (due to hardware presence) once meropenem  completed. She has intermittent back pain that is achy. Patient admitted to LTAC for IV antibiotics, ventilator weaning.  Patient completed antimicrobial therapy for infective intra-abdominal abscess.  Tolerated ventilator weaning, trach Decanulated 6/19. Rafampin stopped on 6/19 second to nausea.  Patient's appetite improved dysphagia resolved nausea vomiting resolved rifampin resumed at 600 mg 3 times a week per Infectious Disease recommendations nausea did not return after resuming rifampin.  Patient advised to continue rifampin and cefadroxil indefinitely as suppressive therapy for possible hardware involvement.  Advised she will need a follow-up with Infectious Disease before stopping these medications.  She will also need follow-up with Urology for her left nephrostomy drain neurosurgery, in GI evaluate removing PEG tube.  Home health arranged with [facility].  DME ordered.  Patient advised inpatient rehab strongly recommended for weakness and debility status post recent fall last week.  Patient declined inpatient rehab adamant about returning home  in son to assist with patient care at home.  Discharge coordinated per , home health, and patient.  Patient provided with script for Norco for pain management until she can follow up with her primary care provider.      Hospital Course:     Acute on chronic respiratory failure with hypoxia, resolved   Hospital acquired pseudomonal pneumonia, resolved  S/P trach decannulation 6/19                 Continue scopalamine patch q3 days              Daily weights              Strict Is/Os              Lasix                    NSTEMI type II  CAD s/p stent  Essential HTN   Acute on chronic systolic and diastolic dysfunction, chronic, stable              ASA 81 gm daily              Atorvastatin 80 mg daily              Losartan 25 mg daily              Metoprolol 25 mg BID              Clopidogrel 75 mg daily due to recent  NSTEMI              Risks factor modification,  Low salt diet, low-fat diet, exercise        Intraabdominal abscess   E. Coli and Staph lugdenesis bacteremia 2/2 abdominal infection                 Possible hardware involvement id advises suppressive therapy                          Cefadroxil 1g po bid and rifampin 300mg bid                           6/26 Communicated to ID patient's nausea resolved, tolerating diet. ID will evaluate patient and              make recommendations   Continue cefadroxil and rifampin indefinitely for suppression          DM II with HTN and CKD II                 Accucheck and SSI              Diabetic diet              Monitor renal function              BP control              Diabetes control              Diet modification     Moderate protein malnutrition, improving  Dysphagia, resolved  S/p gastrostomy                 SLP recommended regular diet              Monitor intake              GI follow-up for PEG tube removal     Hypernatremia, resolved        Debility, improving daiily                 Pt/OT              Ambulates with walker        Anemia of chronic infection  Stable  Monitor          Vertebral Hardware Infection, stable  S/p L5-S1 OLIF on 4/12 complicated by L ureteral injury  S/p ureteroureteral anastomoses and ureteral stent placement  S/p L nephrostomy 4/21                 Brace whenever out of bed              PT/OT, progressing well, ambulates in her room with walker              Follow up with neurosurgery              Follow up with urology              Good UOP out of drain and normal urination             ID rec continue suppression with Cefadroxil 1g po bid              Rifampin three times per week dosing     Hematuria, resolved  6/24 urology notified of dark blood in drainage bag  Remains afebrile no leukocytosis hemoglobin stable, urinating in toilet, if reoccurs order UA         Rectal ulcer                  Noted on sigmoidoscopy 5/13 and healing on  colonoscopy               Avoid fecal collection devices     History of Reggie upper and lower DVT              S/p ivc filter               Heparin q8h---- consider transitioning to oral anticoag                            Heparin discontinued    Past Medical History:   Diagnosis Date    Anticoagulant long-term use     Asthma     Back pain     Bradycardia, unspecified 2019    The etiology of the bradycardic episode is unclear.  The have appear to be respiratory in origin (at least the 1st episode).  We will continue to monitor carefully.  We are awaiting evaluation by Cardiology.      CAD (coronary artery disease)     s/p stentimg  (2), (1)    Carotid artery stenosis     Chronic combined systolic and diastolic CHF (congestive heart failure) 2019    Diabetes mellitus type 2 in obese     HTN (hypertension), benign     Hyperlipidemia     Keloid cicatrix     NPDR (nonproliferative diabetic retinopathy) 2015    NSTEMI (non-ST elevated myocardial infarction)     Nuclear sclerosis - Right Eye 3/18/2014    Primary localized osteoarthrosis, lower leg 2014    Sleep apnea      Past Surgical History:   Procedure Laterality Date    BRONCHOSCOPY N/A 2019    Procedure: BRONCHOSCOPY;  Surgeon: Sean Ruano MD;  Location: [facility] OR [address] [place] [address];  Service: General;  Laterality: N/A;    CARDIAC CATHETERIZATION      cataract extraction left eye      cataracts      CAUDAL EPIDURAL STEROID INJECTION N/A 2019    Procedure: Injection-steroid-epidural-caudal;  Surgeon: Dave Bentley Jr., MD;  Location: [facility] PAIN MGT;  Service: Pain Management;  Laterality: N/A;     SECTION, LOW TRANSVERSE      COLONOSCOPY N/A 2019    Procedure: COLONOSCOPY;  Surgeon: Al Alaniz MD;  Location: [facility] ENDO ([facility]);  Service: Endoscopy;  Laterality: N/A;    CORONARY ANGIOPLASTY      ESOPHAGOGASTRODUODENOSCOPY W/ PEG  2019    Procedure: EGD, WITH PEG TUBE INSERTION;   Surgeon: Sean Ruano MD;  Location: [address];  Service: General;;    EXCISION TURBINATE, SUBMUCOUS      FLEXIBLE SIGMOIDOSCOPY N/A 5/13/2019    Procedure: SIGMOIDOSCOPY, FLEXIBLE;  Surgeon: ALBERTO Amin MD;  Location: [facility] ENDO ([address] [place] [address]);  Service: Endoscopy;  Laterality: N/A;    FLEXIBLE SIGMOIDOSCOPY N/A 5/21/2019    Procedure: SIGMOIDOSCOPY, FLEXIBLE;  Surgeon: ALBERTO Amin MD;  Location: [facility] ENDO ([address] [place] [address]);  Service: Endoscopy;  Laterality: N/A;    FUSION OF LUMBAR SPINE BY ANTERIOR APPROACH Left 4/12/2019    Procedure: FUSION, SPINE, LUMBAR, ANTERIOR APPROACH Left L5-S1 Anterior to Psoa Interbody Fusion, L5-S1 Posterior Instrumentation;  Surgeon: Mk George MD;  Location: [address];  Service: Neurosurgery;  Laterality: Left;  Porcedure:  Left L5-S1 Anterior to Psoa Interbody Fusion, L5-S1 Posterior Instrumentation  Surgery Time: 4 Hrs  LOS: 2-3  Anesthesia: General   Blood: Type & Screen  R    HAND SURGERY Left     HAND SURGERY Right     torn ligament repair/ Dr. Yeboah    HYSTERECTOMY      left foot surgery      left wrist surgery      NASAL SEPTUM SURGERY  5/7/15    PERCUTANEOUS NEPHROSTOMY Left 4/21/2019    Procedure: Creation, Nephrostomy, Percutaneous;  Surgeon: Karina Surgeon;  Location: [facility];  Service: Anesthesiology;  Laterality: Left;    REPAIR OF URETER  4/12/2019    Procedure: REPAIR, URETER;  Surgeon: Mk George MD;  Location: [address];  Service: Neurosurgery;;    REPLACEMENT OF NEPHROSTOMY TUBE N/A 7/18/2019    Procedure: REPLACEMENT, NEPHROSTOMY TUBE;  Surgeon: [facility] Diagnostic Provider;  Location: [address];  Service: Anesthesiology;  Laterality: N/A;  188    REPLACEMENT OF NEPHROSTOMY TUBE N/A 7/24/2019    Procedure: REPLACEMENT, NEPHROSTOMY TUBE;  Surgeon: [facility] Diagnostic Provider;  Location: [address];  Service: Anesthesiology;  Laterality: N/A;  188    REPLACEMENT OF NEPHROSTOMY TUBE N/A 10/7/2019    Procedure:  REPLACEMENT, NEPHROSTOMY TUBE;  Surgeon: [place] Diagnostic Provider;  Location: [facility] OR 2ND FLR;  Service: Anesthesiology;  Laterality: N/A;  189    rt elbow surgery      S/P LAD COATED STENT  05/14/2010    6 total     S/P OM1 STENT  08/2003    SINUS SURGERY      F[place]E[facility]S.    TRACHEOSTOMY N/A 5/2/2019    Procedure: CREATION, TRACHEOSTOMY;  Surgeon: Sean Ruano MD;  Location: [facility] OR 2ND FLR;  Service: General;  Laterality: N/A;    TUBAL LIGATION       Social History     Tobacco Use    Smoking status: Never Smoker    Smokeless tobacco: Never Used   Substance Use Topics    Alcohol use: No     Alcohol/week: 0.0 standard drinks    Drug use: No     Family History   Problem Relation Age of Onset    Diabetes Mother     Heart disease Mother     Diabetes Father     Leukemia Father         leukemia    Heart attack Father     Diabetes Sister     Diabetes Brother     Diabetes Sister     No Known Problems Sister     No Known Problems Brother     No Known Problems Brother     No Known Problems Maternal Grandmother     No Known Problems Maternal Grandfather     No Known Problems Paternal Grandmother     No Known Problems Paternal Grandfather     No Known Problems Son     No Known Problems Son     No Known Problems Maternal Aunt     No Known Problems Maternal Uncle     No Known Problems Paternal Aunt     No Known Problems Paternal Uncle     Colon cancer Neg Hx     Inflammatory bowel disease Neg Hx     Melanoma Neg Hx     Psoriasis Neg Hx     Lupus Neg Hx     Eczema Neg Hx     Acne Neg Hx     Amblyopia Neg Hx     Blindness Neg Hx     Cancer Neg Hx     Cataracts Neg Hx     Glaucoma Neg Hx     Hypertension Neg Hx     Macular degeneration Neg Hx     Retinal detachment Neg Hx     Strabismus Neg Hx     Stroke Neg Hx     Thyroid disease Neg Hx     Heart failure Neg Hx     Hyperlipidemia Neg Hx      Allergy:  Review of patient's allergies indicates:   Allergen Reactions    Milk  containing products      Causes GI distress     Outpatient Encounter Medications as of 11/22/2019   Medication Sig Dispense Refill    ACCU-CHEK EDIN PLUS METER Misc       albuterol (PROVENTIL/VENTOLIN HFA) 90 mcg/actuation inhaler Inhale 2 puffs into the lungs every 6 (six) hours as needed for Wheezing. 1 each 11    albuterol-ipratropium (DUO-NEB) 2.5 mg-0.5 mg/3 mL nebulizer solution Inhale 3 mLs into the lungs.      amLODIPine (NORVASC) 10 MG tablet 10 mg by Tube route.      aspirin 81 mg Tab Take 81 mg by mouth. 1 Tablet Oral Every day      atorvastatin (LIPITOR) 80 MG tablet 1 tablet (80 mg total) by Per G Tube route once daily. 90 tablet 3    blood sugar diagnostic (ACCU-CHEK EDIN PLUS TEST STRP) Strp TEST BLOOD SUGARS 4 TIMES DAILY 150 strip 11    cefadroxil (DURICEF) 1 gram tablet TAKE 1 TABLET (1 G TOTAL) BY MOUTH 2 (TWO) TIMES DAILY.  3    celecoxib (CELEBREX) 200 MG capsule Take 1 capsule (200 mg total) by mouth 2 (two) times daily as needed for Pain. 60 capsule 2    clopidogrel (PLAVIX) 75 mg tablet Take 1 tablet (75 mg total) by mouth once daily. 90 tablet 3    escitalopram oxalate (LEXAPRO) 10 MG tablet Take 1 tablet (10 mg total) by mouth once daily. 30 tablet 11    famotidine (PEPCID) 20 MG tablet Take 1 tablet (20 mg total) by mouth 2 (two) times daily. 60 tablet 11    furosemide (LASIX) 40 MG tablet Take 1 tablet (40 mg total) by mouth once daily. 30 tablet 11    gabapentin (NEURONTIN) 300 MG capsule Take 1 capsule (300 mg total) by mouth 2 (two) times daily. 60 capsule 11    HYDROcodone-acetaminophen (NORCO) 5-325 mg per tablet Take 1 tablet by mouth every 6 (six) hours as needed for Pain. 28 tablet 0    insulin aspart U-100 (NOVOLOG) 100 unit/mL (3 mL) InPn pen Inject 1-10 Units into the skin 4 (four) times daily before meals and nightly. 12 mL 11    losartan (COZAAR) 50 MG tablet Take 1 tablet (50 mg total) by mouth once daily. 90 tablet 3    metoprolol tartrate (LOPRESSOR) 25  MG tablet Take 1 tablet (25 mg total) by mouth 2 (two) times daily. 60 tablet 11    multivitamin (THERAGRAN) per tablet Take 1 tablet by mouth once daily.      nitroGLYCERIN (NITROSTAT) 0.4 MG SL tablet Take one every 2-3 min till chestpain relief for 3 times and if still no relief, call MD or come to ED 30 tablet 11    ondansetron (ZOFRAN-ODT) 8 MG TbDL Take 1 tablet (8 mg total) by mouth every 12 (twelve) hours as needed. 30 tablet 2    potassium chloride SA (K-DUR,KLOR-CON) 20 MEQ tablet Take 1 tablet (20 mEq total) by mouth 2 (two) times daily. 60 tablet 11    rifAMpin (RIFADIN) 300 MG capsule       zolpidem (AMBIEN) 5 MG Tab Take 1 tablet (5 mg total) by mouth nightly as needed. 30 tablet 2     Facility-Administered Encounter Medications as of 11/22/2019   Medication Dose Route Frequency Provider Last Rate Last Dose    lidocaine (PF) 10 mg/ml (1%) injection 10 mg  1 mL Intradermal Once Dave Bentley Jr., MD        lidocaine HCl 2% urojet   Urethral Once Robin Boyd MD         Review of Systems   ROS  Physical Exam     Vitals:    11/22/19 0911   BP: 113/62   Pulse: 85     Physical Exam   Constitutional: She is oriented to person, place, and time. She appears well-developed and well-nourished. No distress.   On a wheel chair, walks with an assistance   HENT:   Head: Normocephalic and atraumatic.   Right Ear: External ear normal.   Left Ear: External ear normal.   Nose: Nose normal.   Eyes: Conjunctivae and EOM are normal. Pupils are equal, round, and reactive to light. No scleral icterus.   Neck: Normal range of motion. Neck supple. No JVD present. No tracheal deviation present. No thyromegaly present.   Cardiovascular: Normal rate, regular rhythm, normal heart sounds and intact distal pulses.  Exam reveals no gallop and no friction rub.    No murmur heard.  Pulmonary/Chest: Effort normal and breath sounds normal. No respiratory distress. She has no wheezes.   Abdominal: Soft. Bowel sounds are  normal. She exhibits no distension and no mass. There is no tenderness. There is no rebound and no guarding.   Genitourinary: No vaginal discharge found.   Genitourinary Comments: Left neph tube in place.  However, there is a leak at the connection from the part is a part of the left neph tube.  Tube was flushed.  Drains well but the leak at the part noted.  Urine collected from the left neph tube.     Musculoskeletal: Normal range of motion. She exhibits no edema, tenderness or deformity.   Lymphadenopathy:     She has no cervical adenopathy.   Neurological: She is alert and oriented to person, place, and time.   Skin: Skin is warm and dry. She is not diaphoretic.     Psychiatric: She has a normal mood and affect. Her behavior is normal. Thought content normal.       LABS:  Lab Results   Component Value Date    CREATININE 1.2 11/21/2019    CREATININE 1.0 11/13/2019    CREATININE 1.2 10/28/2019     Urine Culture, Routine   Date Value Ref Range Status   10/28/2019 ENTEROBACTER CLOACAE  >100,000 cfu/ml   (A)  Final     Radiology  CT 10/28/19  Left-sided percutaneous nephrostomy tube in place.  Moderate left-sided hydronephrosis, new when compared to prior.  Postoperative changes of left ureteral reconstruction with apparent upstream dilatation of the ureter beginning at the level of the surgical clips.    No evidence of focal intra-abdominal abscess.    Extensive atherosclerotic calcification.    Hepatomegaly.    Stable wedge-shaped hypodensities within the spleen, likely infarcts.    Assessment and Plan:  Mariann was seen today for follow-up.    Diagnoses and all orders for this visit:    Pyelonephritis of left kidney  -     Urine culture    Left ureteral injury, subsequent encounter    Nephrostomy status    I checked the left neph tube and noted that urine leakage at the connection where the string is coiled in.  Neph tube flushed and it is draining well.  Urine culture collected from the neph tube.  I personally  took her to IR for Dr. Nava to take a look at the neph tube connection in order to prevent it from leaking.    Eventually we need to left psoas hitch or Boari flap or ileal ureter to connect left proximal ureter to the bladder.  She needs to fully recovered with better strength and better nutritional status ( albumin needs to be normalized).  Will plan intraop cystogram and left antegrade nephrostogram to determine what surgery may work for her in order to reconnect her left ureter back to the bladder.  Will plan change the left neph tube at the same time if possible.    Consider IMPACT Advanced Recover supplement prior to her surgery.    I spent 40 minutes with the patient of which more than half was spent in direct consultation with the patient in regards to our treatment and plan.    Follow-up:  Follow up in about 19 days (around 12/11/2019), or intra-op cystogram, cysto left RPG, left antegrade nephrostogram.

## 2019-11-22 NOTE — H&P (VIEW-ONLY)
CC: left nephrostomy tube following left ureteral injury. Last left neph tube changed on 10/7/19, c/o urine leak at the neph tube connection    Mariann Huff is a 59 y.o. woman who is here for the evaluation of left nephrostomy tube.  She has been doing well. Got anew cane and has been able to walk much better.  C/o urine leakage at the neph tube connection.  C/o cloudy urine.  Denies any fever or chills.    She is still undergoing her rehab with strengthening of the lower LE.  Dr. George in neurosurgery pointed out that she made a good progress from her back surgery.  She can undergo urologic surgery if necessary.  We have been watching her physical recovery as well as nutritional status ( has had low albumin).  She walks with a cane.  She is here today with her .    Mariann Huff underwent left anterior L5-S1 interbody fusion who sustained a left ureteral transection on 4/12/19.  She underwent a ureteroureterostomy with stent placement at that time. She presented to the ER 4/20/19 with AMS, fever and respiratory distress. On imaging she was found to have a large amount of air and fluid within the retroperitoneal and within the left collecting system. This was not amenable to drainage by IR therefore she was taken to the OR for washout.     Procedure(s) Performed: 4/20/19  1. Exploratory laparotomy  2. Ligation of left ureter  3. Removal of left JJ ureteral stent    Findings:   - left retroperitoneum developed up to lower pole of the left kidney  - pockets of purulence identified and evacuated, cultures sent  - left ureteral anastomosis found to be completely broken down with stent clearly visible  - posterior to the ureter, spinal hardware was visibly exposed  - proximal end of the ureter clipped and stent removed as tissue felt to be unsuitable for repair  - no bowel injury noted    Subsequently she had left nephrostomy tube placed by IR on 4/20/19.  She was discharged from hospital after a long  complicated course on 7/2/19.  She is here for urology follow up.  She reports that she has been able to void well thru the bladder as well as via left nephrostomy tube.    The below is summary of her previous discharge notes.  history of Asthma, HTN, HLD, CAD (LAD GINGER proximal and distal 2013 and GINGER ostial LAD 2/2014), HFpEF, NSTEMI, T2DM, Obesity, Sleep Apnea, and back pain who presented to the ED on 6/6/19 with acute shortness of breath started the night before. Mrs. Huff has had two recent hospitalizations. 4/12/19-4/14/19 for L5-S1 OLIF with Neurosurgery with intraoperaative left ureteral injury with ureteroureteral anastomosis and ureteral stent placement. Second admission, 4/20/19-6/5/19 for intraabdominal abscess s/p washout and ligation of left ureter with nephrostomy tube placement on 4/21/19 and tracheostomy and PEG placement on 5/2/19.  Most recent hospitalization complicated by BUE and RLE DVT s/p IVC filter on 5/29/19 given concern for GIB with workup revealing rectal ulcer requiring pRBC transfusion. She was followed by ID during admission and is being treated with long term antibiotic therapy for E coli and Staph lugdenesis bacteremia with IV cefazolin and rifampin with end dated 6/18/19 given hardware.  She also completed 7 day course of cefepime for HAP (pseudomonas) on 5/25/19 and candida glabrata UTI that she received 7 days of high dose fluconazole.  She was discharged to rehab. She as admitted to ICU where she was found to have acute blood loss from rectal ulcer. Patient additionally developed NSTEMI from the anemia and pseudomonal hospital associated pneumonia. Imaging showed persisting fluid collection along the midline abdominal incision. IR aspirated fluid collection and cultures are pending. ID recommended continue meropenem until 6/15 and rifampin 300 mg BID and will require suppressive therapy indefinitely with cefadroxil and rifampin (due to hardware presence) once meropenem  completed. She has intermittent back pain that is achy. Patient admitted to LTAC for IV antibiotics, ventilator weaning.  Patient completed antimicrobial therapy for infective intra-abdominal abscess.  Tolerated ventilator weaning, trach Decanulated 6/19. Rafampin stopped on 6/19 second to nausea.  Patient's appetite improved dysphagia resolved nausea vomiting resolved rifampin resumed at 600 mg 3 times a week per Infectious Disease recommendations nausea did not return after resuming rifampin.  Patient advised to continue rifampin and cefadroxil indefinitely as suppressive therapy for possible hardware involvement.  Advised she will need a follow-up with Infectious Disease before stopping these medications.  She will also need follow-up with Urology for her left nephrostomy drain neurosurgery, in GI evaluate removing PEG tube.  Home health arranged with [facility].  DME ordered.  Patient advised inpatient rehab strongly recommended for weakness and debility status post recent fall last week.  Patient declined inpatient rehab adamant about returning home  in son to assist with patient care at home.  Discharge coordinated per , home health, and patient.  Patient provided with script for Norco for pain management until she can follow up with her primary care provider.      Hospital Course:     Acute on chronic respiratory failure with hypoxia, resolved   Hospital acquired pseudomonal pneumonia, resolved  S/P trach decannulation 6/19                 Continue scopalamine patch q3 days              Daily weights              Strict Is/Os              Lasix                    NSTEMI type II  CAD s/p stent  Essential HTN   Acute on chronic systolic and diastolic dysfunction, chronic, stable              ASA 81 gm daily              Atorvastatin 80 mg daily              Losartan 25 mg daily              Metoprolol 25 mg BID              Clopidogrel 75 mg daily due to recent  NSTEMI              Risks factor modification,  Low salt diet, low-fat diet, exercise        Intraabdominal abscess   E. Coli and Staph lugdenesis bacteremia 2/2 abdominal infection                 Possible hardware involvement id advises suppressive therapy                          Cefadroxil 1g po bid and rifampin 300mg bid                           6/26 Communicated to ID patient's nausea resolved, tolerating diet. ID will evaluate patient and              make recommendations   Continue cefadroxil and rifampin indefinitely for suppression          DM II with HTN and CKD II                 Accucheck and SSI              Diabetic diet              Monitor renal function              BP control              Diabetes control              Diet modification     Moderate protein malnutrition, improving  Dysphagia, resolved  S/p gastrostomy                 SLP recommended regular diet              Monitor intake              GI follow-up for PEG tube removal     Hypernatremia, resolved        Debility, improving daiily                 Pt/OT              Ambulates with walker        Anemia of chronic infection  Stable  Monitor          Vertebral Hardware Infection, stable  S/p L5-S1 OLIF on 4/12 complicated by L ureteral injury  S/p ureteroureteral anastomoses and ureteral stent placement  S/p L nephrostomy 4/21                 Brace whenever out of bed              PT/OT, progressing well, ambulates in her room with walker              Follow up with neurosurgery              Follow up with urology              Good UOP out of drain and normal urination             ID rec continue suppression with Cefadroxil 1g po bid              Rifampin three times per week dosing     Hematuria, resolved  6/24 urology notified of dark blood in drainage bag  Remains afebrile no leukocytosis hemoglobin stable, urinating in toilet, if reoccurs order UA         Rectal ulcer                  Noted on sigmoidoscopy 5/13 and healing on  colonoscopy               Avoid fecal collection devices     History of Reggie upper and lower DVT              S/p ivc filter               Heparin q8h---- consider transitioning to oral anticoag                            Heparin discontinued    Past Medical History:   Diagnosis Date    Anticoagulant long-term use     Asthma     Back pain     Bradycardia, unspecified 2019    The etiology of the bradycardic episode is unclear.  The have appear to be respiratory in origin (at least the 1st episode).  We will continue to monitor carefully.  We are awaiting evaluation by Cardiology.      CAD (coronary artery disease)     s/p stentimg  (2), (1)    Carotid artery stenosis     Chronic combined systolic and diastolic CHF (congestive heart failure) 2019    Diabetes mellitus type 2 in obese     HTN (hypertension), benign     Hyperlipidemia     Keloid cicatrix     NPDR (nonproliferative diabetic retinopathy) 2015    NSTEMI (non-ST elevated myocardial infarction)     Nuclear sclerosis - Right Eye 3/18/2014    Primary localized osteoarthrosis, lower leg 2014    Sleep apnea      Past Surgical History:   Procedure Laterality Date    BRONCHOSCOPY N/A 2019    Procedure: BRONCHOSCOPY;  Surgeon: Sean Ruano MD;  Location: [facility] OR [address] [place] [address];  Service: General;  Laterality: N/A;    CARDIAC CATHETERIZATION      cataract extraction left eye      cataracts      CAUDAL EPIDURAL STEROID INJECTION N/A 2019    Procedure: Injection-steroid-epidural-caudal;  Surgeon: Dave Bentley Jr., MD;  Location: [facility] PAIN MGT;  Service: Pain Management;  Laterality: N/A;     SECTION, LOW TRANSVERSE      COLONOSCOPY N/A 2019    Procedure: COLONOSCOPY;  Surgeon: Al Alaniz MD;  [facility]  Service: Endoscopy;  Laterality: N/A;    CORONARY ANGIOPLASTY      ESOPHAGOGASTRODUODENOSCOPY W/ PEG  2019    Procedure: EGD, WITH PEG TUBE INSERTION;   Surgeon: Sean Ruano MD;  Location: [address];  Service: General;;    EXCISION TURBINATE, SUBMUCOUS      FLEXIBLE SIGMOIDOSCOPY N/A 5/13/2019    Procedure: SIGMOIDOSCOPY, FLEXIBLE;  Surgeon: ALBERTO Amin MD;  Location: [facility] ENDO ([address] [place] [address]);  Service: Endoscopy;  Laterality: N/A;    FLEXIBLE SIGMOIDOSCOPY N/A 5/21/2019    Procedure: SIGMOIDOSCOPY, FLEXIBLE;  Surgeon: ALBERTO Amin MD;  Location: [facility] ENDO ([address] [place] [address]);  Service: Endoscopy;  Laterality: N/A;    FUSION OF LUMBAR SPINE BY ANTERIOR APPROACH Left 4/12/2019    Procedure: FUSION, SPINE, LUMBAR, ANTERIOR APPROACH Left L5-S1 Anterior to Psoa Interbody Fusion, L5-S1 Posterior Instrumentation;  Surgeon: Mk George MD;  Location: [address];  Service: Neurosurgery;  Laterality: Left;  Porcedure:  Left L5-S1 Anterior to Psoa Interbody Fusion, L5-S1 Posterior Instrumentation  Surgery Time: 4 Hrs  LOS: 2-3  Anesthesia: General   Blood: Type & Screen  R    HAND SURGERY Left     HAND SURGERY Right     torn ligament repair/ Dr. Yeboah    HYSTERECTOMY      left foot surgery      left wrist surgery      NASAL SEPTUM SURGERY  5/7/15    PERCUTANEOUS NEPHROSTOMY Left 4/21/2019    Procedure: Creation, Nephrostomy, Percutaneous;  Surgeon: Karina Surgeon;  Location: [facility];  Service: Anesthesiology;  Laterality: Left;    REPAIR OF URETER  4/12/2019    Procedure: REPAIR, URETER;  Surgeon: Mk George MD;  Location: [address];  Service: Neurosurgery;;    REPLACEMENT OF NEPHROSTOMY TUBE N/A 7/18/2019    Procedure: REPLACEMENT, NEPHROSTOMY TUBE;  Surgeon: [facility] Diagnostic Provider;  Location: [address];  Service: Anesthesiology;  Laterality: N/A;  188    REPLACEMENT OF NEPHROSTOMY TUBE N/A 7/24/2019    Procedure: REPLACEMENT, NEPHROSTOMY TUBE;  Surgeon: [facility] Diagnostic Provider;  Location: [address];  Service: Anesthesiology;  Laterality: N/A;  188    REPLACEMENT OF NEPHROSTOMY TUBE N/A 10/7/2019    Procedure:  REPLACEMENT, NEPHROSTOMY TUBE;  Surgeon: [place] Diagnostic Provider;  Location: [facility] OR 2ND FLR;  Service: Anesthesiology;  Laterality: N/A;  189    rt elbow surgery      S/P LAD COATED STENT  05/14/2010    6 total     S/P OM1 STENT  08/2003    SINUS SURGERY      F.E.S.S.    TRACHEOSTOMY N/A 5/2/2019    Procedure: CREATION, TRACHEOSTOMY;  Surgeon: Sean Ruano MD;  Location: [facility] OR 2ND FLR;  Service: General;  Laterality: N/A;    TUBAL LIGATION       Social History     Tobacco Use    Smoking status: Never Smoker    Smokeless tobacco: Never Used   Substance Use Topics    Alcohol use: No     Alcohol/week: 0.0 standard drinks    Drug use: No     Family History   Problem Relation Age of Onset    Diabetes Mother     Heart disease Mother     Diabetes Father     Leukemia Father         leukemia    Heart attack Father     Diabetes Sister     Diabetes Brother     Diabetes Sister     No Known Problems Sister     No Known Problems Brother     No Known Problems Brother     No Known Problems Maternal Grandmother     No Known Problems Maternal Grandfather     No Known Problems Paternal Grandmother     No Known Problems Paternal Grandfather     No Known Problems Son     No Known Problems Son     No Known Problems Maternal Aunt     No Known Problems Maternal Uncle     No Known Problems Paternal Aunt     No Known Problems Paternal Uncle     Colon cancer Neg Hx     Inflammatory bowel disease Neg Hx     Melanoma Neg Hx     Psoriasis Neg Hx     Lupus Neg Hx     Eczema Neg Hx     Acne Neg Hx     Amblyopia Neg Hx     Blindness Neg Hx     Cancer Neg Hx     Cataracts Neg Hx     Glaucoma Neg Hx     Hypertension Neg Hx     Macular degeneration Neg Hx     Retinal detachment Neg Hx     Strabismus Neg Hx     Stroke Neg Hx     Thyroid disease Neg Hx     Heart failure Neg Hx     Hyperlipidemia Neg Hx      Allergy:  Review of patient's allergies indicates:   Allergen Reactions    Milk  containing products      Causes GI distress     Outpatient Encounter Medications as of 11/22/2019   Medication Sig Dispense Refill    ACCU-CHEK EDIN PLUS METER Misc       albuterol (PROVENTIL/VENTOLIN HFA) 90 mcg/actuation inhaler Inhale 2 puffs into the lungs every 6 (six) hours as needed for Wheezing. 1 each 11    albuterol-ipratropium (DUO-NEB) 2.5 mg-0.5 mg/3 mL nebulizer solution Inhale 3 mLs into the lungs.      amLODIPine (NORVASC) 10 MG tablet 10 mg by Tube route.      aspirin 81 mg Tab Take 81 mg by mouth. 1 Tablet Oral Every day      atorvastatin (LIPITOR) 80 MG tablet 1 tablet (80 mg total) by Per G Tube route once daily. 90 tablet 3    blood sugar diagnostic (ACCU-CHEK EDIN PLUS TEST STRP) Strp TEST BLOOD SUGARS 4 TIMES DAILY 150 strip 11    cefadroxil (DURICEF) 1 gram tablet TAKE 1 TABLET (1 G TOTAL) BY MOUTH 2 (TWO) TIMES DAILY.  3    celecoxib (CELEBREX) 200 MG capsule Take 1 capsule (200 mg total) by mouth 2 (two) times daily as needed for Pain. 60 capsule 2    clopidogrel (PLAVIX) 75 mg tablet Take 1 tablet (75 mg total) by mouth once daily. 90 tablet 3    escitalopram oxalate (LEXAPRO) 10 MG tablet Take 1 tablet (10 mg total) by mouth once daily. 30 tablet 11    famotidine (PEPCID) 20 MG tablet Take 1 tablet (20 mg total) by mouth 2 (two) times daily. 60 tablet 11    furosemide (LASIX) 40 MG tablet Take 1 tablet (40 mg total) by mouth once daily. 30 tablet 11    gabapentin (NEURONTIN) 300 MG capsule Take 1 capsule (300 mg total) by mouth 2 (two) times daily. 60 capsule 11    HYDROcodone-acetaminophen (NORCO) 5-325 mg per tablet Take 1 tablet by mouth every 6 (six) hours as needed for Pain. 28 tablet 0    insulin aspart U-100 (NOVOLOG) 100 unit/mL (3 mL) InPn pen Inject 1-10 Units into the skin 4 (four) times daily before meals and nightly. 12 mL 11    losartan (COZAAR) 50 MG tablet Take 1 tablet (50 mg total) by mouth once daily. 90 tablet 3    metoprolol tartrate (LOPRESSOR) 25  MG tablet Take 1 tablet (25 mg total) by mouth 2 (two) times daily. 60 tablet 11    multivitamin (THERAGRAN) per tablet Take 1 tablet by mouth once daily.      nitroGLYCERIN (NITROSTAT) 0.4 MG SL tablet Take one every 2-3 min till chestpain relief for 3 times and if still no relief, call MD or come to ED 30 tablet 11    ondansetron (ZOFRAN-ODT) 8 MG TbDL Take 1 tablet (8 mg total) by mouth every 12 (twelve) hours as needed. 30 tablet 2    potassium chloride SA (K-DUR,KLOR-CON) 20 MEQ tablet Take 1 tablet (20 mEq total) by mouth 2 (two) times daily. 60 tablet 11    rifAMpin (RIFADIN) 300 MG capsule       zolpidem (AMBIEN) 5 MG Tab Take 1 tablet (5 mg total) by mouth nightly as needed. 30 tablet 2     Facility-Administered Encounter Medications as of 11/22/2019   Medication Dose Route Frequency Provider Last Rate Last Dose    lidocaine (PF) 10 mg/ml (1%) injection 10 mg  1 mL Intradermal Once Dave Betnley Jr., MD        lidocaine HCl 2% urojet   Urethral Once Robin Boyd MD         Review of Systems   ROS  Physical Exam     Vitals:    11/22/19 0911   BP: 113/62   Pulse: 85     Physical Exam   Constitutional: She is oriented to person, place, and time. She appears well-developed and well-nourished. No distress.   On a wheel chair, walks with an assistance   HENT:   Head: Normocephalic and atraumatic.   Right Ear: External ear normal.   Left Ear: External ear normal.   Nose: Nose normal.   Eyes: Conjunctivae and EOM are normal. Pupils are equal, round, and reactive to light. No scleral icterus.   Neck: Normal range of motion. Neck supple. No JVD present. No tracheal deviation present. No thyromegaly present.   Cardiovascular: Normal rate, regular rhythm, normal heart sounds and intact distal pulses.  Exam reveals no gallop and no friction rub.    No murmur heard.  Pulmonary/Chest: Effort normal and breath sounds normal. No respiratory distress. She has no wheezes.   Abdominal: Soft. Bowel sounds are  normal. She exhibits no distension and no mass. There is no tenderness. There is no rebound and no guarding.   Genitourinary: No vaginal discharge found.   Genitourinary Comments: Left neph tube in place.  However, there is a leak at the connection from the part is a part of the left neph tube.  Tube was flushed.  Drains well but the leak at the part noted.  Urine collected from the left neph tube.     Musculoskeletal: Normal range of motion. She exhibits no edema, tenderness or deformity.   Lymphadenopathy:     She has no cervical adenopathy.   Neurological: She is alert and oriented to person, place, and time.   Skin: Skin is warm and dry. She is not diaphoretic.     Psychiatric: She has a normal mood and affect. Her behavior is normal. Thought content normal.       LABS:  Lab Results   Component Value Date    CREATININE 1.2 11/21/2019    CREATININE 1.0 11/13/2019    CREATININE 1.2 10/28/2019     Urine Culture, Routine   Date Value Ref Range Status   10/28/2019 ENTEROBACTER CLOACAE  >100,000 cfu/ml   (A)  Final     Radiology  CT 10/28/19  Left-sided percutaneous nephrostomy tube in place.  Moderate left-sided hydronephrosis, new when compared to prior.  Postoperative changes of left ureteral reconstruction with apparent upstream dilatation of the ureter beginning at the level of the surgical clips.    No evidence of focal intra-abdominal abscess.    Extensive atherosclerotic calcification.    Hepatomegaly.    Stable wedge-shaped hypodensities within the spleen, likely infarcts.    Assessment and Plan:  Mariann was seen today for follow-up.    Diagnoses and all orders for this visit:    Pyelonephritis of left kidney  -     Urine culture    Left ureteral injury, subsequent encounter    Nephrostomy status    I checked the left neph tube and noted that urine leakage at the connection where the string is coiled in.  Neph tube flushed and it is draining well.  Urine culture collected from the neph tube.  I personally  took her to IR for Dr. Nava to take a look at the neph tube connection in order to prevent it from leaking.    Eventually we need to left psoas hitch or Boari flap or ileal ureter to connect left proximal ureter to the bladder.  She needs to fully recovered with better strength and better nutritional status ( albumin needs to be normalized).  Will plan intraop cystogram and left antegrade nephrostogram to determine what surgery may work for her in order to reconnect her left ureter back to the bladder.  Will plan change the left neph tube at the same time if possible.    Consider IMPACT Advanced Recover supplement prior to her surgery.    I spent 40 minutes with the patient of which more than half was spent in direct consultation with the patient in regards to our treatment and plan.    Follow-up:  Follow up in about 19 days (around 12/11/2019), or intra-op cystogram, cysto left RPG, left antegrade nephrostogram.

## 2019-11-22 NOTE — LETTER
November 22, 2019      Jasbir Haney MD  [address] [facility] [place] [address]           [facility] - Urology 4th Floor  [address]  [place] [address]  Phone: [phone]          Patient: Mariann Huff   MR Number: [id number]   YOB: 1960   Date of Visit: 11/22/2019       Dear Dr. Jasbir Haney:    Thank you for referring Mariann Huff to me for evaluation. Attached you will find relevant portions of my assessment and plan of care.    If you have questions, please do not hesitate to call me. I look forward to following Mariann Huff along with you.    Sincerely,    Robin Boyd MD    Enclosure  CC:  No Recipients    If you would like to receive this communication electronically, please contact [email or web address] or [phone] to request more information on Somewhere Link access.    For providers and/or their staff who would like to refer a patient to [facility], please contact us through our one-stop-shop provider referral line, [facility], at [phone].    If you feel you have received this communication in error or would no longer like to receive these types of communications, please e-mail [email or web address]

## 2019-11-24 LAB — BACTERIA UR CULT: NO GROWTH

## 2019-11-25 ENCOUNTER — PATIENT OUTREACH (OUTPATIENT)
Dept: ADMINISTRATIVE | Facility: OTHER | Age: 59
End: 2019-11-25

## 2019-11-25 ENCOUNTER — HOSPITAL ENCOUNTER (OUTPATIENT)
Facility: HOSPITAL | Age: 59
Discharge: HOME | End: 2019-11-25
Attending: RADIOLOGY | Admitting: RADIOLOGY
Payer: MEDICARE

## 2019-11-25 VITALS
HEIGHT: 64 IN | WEIGHT: 141 LBS | BODY MASS INDEX: 24.07 KG/M2 | RESPIRATION RATE: 16 BRPM | HEART RATE: 75 BPM | SYSTOLIC BLOOD PRESSURE: 124 MMHG | TEMPERATURE: 98 F | DIASTOLIC BLOOD PRESSURE: 66 MMHG | OXYGEN SATURATION: 100 %

## 2019-11-25 DIAGNOSIS — N13.30 HYDRONEPHROSIS: ICD-10-CM

## 2019-11-25 DIAGNOSIS — Z93.6 NEPHROSTOMY STATUS: ICD-10-CM

## 2019-11-25 DIAGNOSIS — S37.10XD LEFT URETERAL INJURY, SUBSEQUENT ENCOUNTER: ICD-10-CM

## 2019-11-25 LAB
INR PPP: 0.9 (ref 0.8–1.2)
POCT GLUCOSE: 237 MG/DL (ref 70–110)
PROTHROMBIN TIME: 9.7 SEC (ref 9–12.5)

## 2019-11-25 PROCEDURE — 82962 GLUCOSE BLOOD TEST: CPT

## 2019-11-25 PROCEDURE — 85610 PROTHROMBIN TIME: CPT

## 2019-11-25 PROCEDURE — 63600175 PHARM REV CODE 636 W HCPCS: Performed by: STUDENT IN AN ORGANIZED HEALTH CARE EDUCATION/TRAINING PROGRAM

## 2019-11-25 PROCEDURE — 63600175 PHARM REV CODE 636 W HCPCS: Performed by: RADIOLOGY

## 2019-11-25 RX ORDER — FENTANYL CITRATE 50 UG/ML
INJECTION, SOLUTION INTRAMUSCULAR; INTRAVENOUS CODE/TRAUMA/SEDATION MEDICATION
Status: COMPLETED | OUTPATIENT
Start: 2019-11-25 | End: 2019-11-25

## 2019-11-25 RX ORDER — SODIUM CHLORIDE 9 MG/ML
INJECTION, SOLUTION INTRAVENOUS CONTINUOUS
Status: DISCONTINUED | OUTPATIENT
Start: 2019-11-25 | End: 2019-11-25 | Stop reason: HOSPADM

## 2019-11-25 RX ORDER — MIDAZOLAM HYDROCHLORIDE 1 MG/ML
INJECTION INTRAMUSCULAR; INTRAVENOUS CODE/TRAUMA/SEDATION MEDICATION
Status: COMPLETED | OUTPATIENT
Start: 2019-11-25 | End: 2019-11-25

## 2019-11-25 RX ADMIN — MIDAZOLAM HYDROCHLORIDE 1 MG: 1 INJECTION, SOLUTION INTRAMUSCULAR; INTRAVENOUS at 10:11

## 2019-11-25 RX ADMIN — FENTANYL CITRATE 50 MCG: 50 INJECTION, SOLUTION INTRAMUSCULAR; INTRAVENOUS at 10:11

## 2019-11-25 RX ADMIN — CEFTRIAXONE 2 G: 2 INJECTION, SOLUTION INTRAVENOUS at 09:11

## 2019-11-25 NOTE — PLAN OF CARE
Procedure complete. VSS. Clear urine draining to bag. Noc/o pain or discomfort. Transferred to ROCU. Report to RN

## 2019-11-25 NOTE — H&P
Vascular and Interventional Radiology History & Physical    2019    SUBJECTIVE:     Chief Complaint:   Left nephrostomy leakage    History of Present Illness:  Mariann Huff is a 59 y.o. female who presents for left nephrostomy tube check and change. Hx of ureteral iatrogenic ureteral injury s/p BL neph tube placement. ONLY CHECKING AND REPLACING LEFT TODAY    Past Medical History:  Past Medical History:   Diagnosis Date    Anticoagulant long-term use     Asthma     Back pain     Bradycardia, unspecified 2019    The etiology of the bradycardic episode is unclear.  The have appear to be respiratory in origin (at least the 1st episode).  We will continue to monitor carefully.  We are awaiting evaluation by Cardiology.      CAD (coronary artery disease)     s/p stentimg  (2), (1)    Carotid artery stenosis     Chronic combined systolic and diastolic CHF (congestive heart failure) 2019    Diabetes mellitus type 2 in obese     HTN (hypertension), benign     Hyperlipidemia     Keloid cicatrix     NPDR (nonproliferative diabetic retinopathy) 2015    NSTEMI (non-ST elevated myocardial infarction)     Nuclear sclerosis - Right Eye 3/18/2014    Primary localized osteoarthrosis, lower leg 2014    Sleep apnea        Social History:  Past Surgical History:   Procedure Laterality Date    BRONCHOSCOPY N/A 2019    Procedure: BRONCHOSCOPY;  Surgeon: Sean Ruano MD;  Location: [facility] OR [address] [place] [address];  Service: General;  Laterality: N/A;    CARDIAC CATHETERIZATION      cataract extraction left eye      cataracts      CAUDAL EPIDURAL STEROID INJECTION N/A 2019    Procedure: Injection-steroid-epidural-caudal;  Surgeon: Dave Bentley Jr., MD;  Location: [facility]IN [facility];  Service: Pain Management;  Laterality: N/A;     SECTION, LOW TRANSVERSE      COLONOSCOPY N/A 2019    Procedure: COLONOSCOPY;  Surgeon: Al Alaniz MD;  Location: [facility] ENDO ([address] [place] [address]);   Service: Endoscopy;  Laterality: N/A;    CORONARY ANGIOPLASTY      ESOPHAGOGASTRODUODENOSCOPY W/ PEG  5/2/2019    Procedure: EGD, WITH PEG TUBE INSERTION;  Surgeon: Sean Ruano MD;  Location: [address];  Service: General;;    EXCISION TURBINATE, SUBMUCOUS      FLEXIBLE SIGMOIDOSCOPY N/A 5/13/2019    Procedure: SIGMOIDOSCOPY, FLEXIBLE;  Surgeon: ALBERTO Amin MD;  Location: [facility] ENDO ([address] [place] [address]);  Service: Endoscopy;  Laterality: N/A;    FLEXIBLE SIGMOIDOSCOPY N/A 5/21/2019    Procedure: SIGMOIDOSCOPY, FLEXIBLE;  Surgeon: ALBERTO Amin MD;  [facility]  Service: Endoscopy;  Laterality: N/A;    FUSION OF LUMBAR SPINE BY ANTERIOR APPROACH Left 4/12/2019    Procedure: FUSION, SPINE, LUMBAR, ANTERIOR APPROACH Left L5-S1 Anterior to Psoa Interbody Fusion, L5-S1 Posterior Instrumentation;  Surgeon: Mk George MD;  Location: [address];  Service: Neurosurgery;  Laterality: Left;  Porcedure:  Left L5-S1 Anterior to Psoa Interbody Fusion, L5-S1 Posterior Instrumentation  Surgery Time: 4 Hrs  LOS: 2-3  Anesthesia: General   Blood: Type & Screen  R    HAND SURGERY Left     HAND SURGERY Right     torn ligament repair/ Dr. Yeboah    HYSTERECTOMY      left foot surgery      left wrist surgery      NASAL SEPTUM SURGERY  5/7/15    PERCUTANEOUS NEPHROSTOMY Left 4/21/2019    Procedure: Creation, Nephrostomy, Percutaneous;  Surgeon: Karina Surgeon;  Location: [facility];  Service: Anesthesiology;  Laterality: Left;    REPAIR OF URETER  4/12/2019    Procedure: REPAIR, URETER;  Surgeon: Mk George MD;  Location: [address];  Service: Neurosurgery;;    REPLACEMENT OF NEPHROSTOMY TUBE N/A 7/18/2019    Procedure: REPLACEMENT, NEPHROSTOMY TUBE;  Surgeon: Leo Diagnostic Provider;  Location: [address];  Service: Anesthesiology;  Laterality: N/A;  188    REPLACEMENT OF NEPHROSTOMY TUBE N/A 7/24/2019    Procedure: REPLACEMENT, NEPHROSTOMY TUBE;  Surgeon: Destin  Diagnostic Provider;  [facility];  Service: Anesthesiology;  Laterality: N/A;  188    REPLACEMENT OF NEPHROSTOMY TUBE N/A 10/7/2019    Procedure: REPLACEMENT, NEPHROSTOMY TUBE;  Surgeon: Destin Diagnostic Provider;  [facility];  Service: Anesthesiology;  Laterality: N/A;  189    rt elbow surgery      S/P LAD COATED STENT  05/14/2010    6 total     S/P OM1 STENT  08/2003    SINUS SURGERY      F.E[place]S.S.    TRACHEOSTOMY N/A 5/2/2019    Procedure: CREATION, TRACHEOSTOMY;  Surgeon: Sean Ruano MD;  Location: [facility] OR [address] [place] [address];  Service: General;  Laterality: N/A;    TUBAL LIGATION         Home Meds:   Prior to Admission medications    Medication Sig Start Date End Date Taking? Authorizing Provider   albuterol (PROVENTIL/VENTOLIN HFA) 90 mcg/actuation inhaler Inhale 2 puffs into the lungs every 6 (six) hours as needed for Wheezing. 11/30/18  Yes Jasbir Haney MD   albuterol-ipratropium (DUO-NEB) 2.5 mg-0.5 mg/3 mL nebulizer solution Inhale 3 mLs into the lungs.   Yes Historical Provider, MD   amLODIPine (NORVASC) 10 MG tablet 10 mg by Tube route.   Yes Historical Provider, MD   aspirin 81 mg Tab Take 81 mg by mouth. 1 Tablet Oral Every day   Yes Historical Provider, MD   atorvastatin (LIPITOR) 80 MG tablet 1 tablet (80 mg total) by Per G Tube route once daily. 7/3/19 7/2/20 Yes Kacey Avila NP   cefadroxil (DURICEF) 1 gram tablet TAKE 1 TABLET (1 G TOTAL) BY MOUTH 2 (TWO) TIMES DAILY. 9/23/19  Yes Historical Provider, MD   clopidogrel (PLAVIX) 75 mg tablet Take 1 tablet (75 mg total) by mouth once daily. 11/13/19 11/12/20 Yes Jasbir Haney MD   famotidine (PEPCID) 20 MG tablet Take 1 tablet (20 mg total) by mouth 2 (two) times daily. 7/2/19 7/1/20 Yes Kacey Avila NP   furosemide (LASIX) 40 MG tablet Take 1 tablet (40 mg total) by mouth once daily. 7/3/19 7/2/20 Yes Kacey Avila, NP   gabapentin (NEURONTIN) 300 MG capsule Take 1 capsule (300 mg total) by mouth 2 (two) times  daily. 7/22/19 7/21/20 Yes Mk George MD   insulin aspart U-100 (NOVOLOG) 100 unit/mL (3 mL) InPn pen Inject 1-10 Units into the skin 4 (four) times daily before meals and nightly. 7/2/19 7/1/20 Yes Kacey Avila NP   losartan (COZAAR) 50 MG tablet Take 1 tablet (50 mg total) by mouth once daily. 7/3/19 7/2/20 Yes Kacey Avila NP   metoprolol tartrate (LOPRESSOR) 25 MG tablet Take 1 tablet (25 mg total) by mouth 2 (two) times daily. 7/2/19 7/1/20 Yes Kacey Avila NP   multivitamin (THERAGRAN) per tablet Take 1 tablet by mouth once daily. 7/3/19  Yes Kacey Avila NP   potassium chloride SA (K-DUR,KLOR-CON) 20 MEQ tablet Take 1 tablet (20 mEq total) by mouth 2 (two) times daily. 9/20/19  Yes Robin Boyd MD   rifAMpin (RIFADIN) 300 MG capsule  9/9/19  Yes Historical Provider, MD   ACCU-CHEK EDIN PLUS METER Misc  9/23/14   Historical Provider, MD   blood sugar diagnostic (ACCU-CHEK EDIN PLUS TEST STRP) Strp TEST BLOOD SUGARS 4 TIMES DAILY 8/31/15   Rosmery Fisher NP   celecoxib (CELEBREX) 200 MG capsule Take 1 capsule (200 mg total) by mouth 2 (two) times daily as needed for Pain. 10/18/19   Mk George MD   escitalopram oxalate (LEXAPRO) 10 MG tablet Take 1 tablet (10 mg total) by mouth once daily. 7/3/19 7/2/20  Kacey Avila NP   HYDROcodone-acetaminophen (NORCO) 5-325 mg per tablet Take 1 tablet by mouth every 6 (six) hours as needed for Pain. 7/2/19   Kacey Avila NP   nitroGLYCERIN (NITROSTAT) 0.4 MG SL tablet Take one every 2-3 min till chestpain relief for 3 times and if still no relief, call MD or come to ED 1/6/17   Jasbir Lyndon, MD   ondansetron (ZOFRAN-ODT) 8 MG TbDL Take 1 tablet (8 mg total) by mouth every 12 (twelve) hours as needed. 7/15/19   Jasbir Haney MD   zolpidem (AMBIEN) 5 MG Tab Take 1 tablet (5 mg total) by mouth nightly as needed. 11/13/19 5/13/20  Jasbir Haney MD       Anticoagulants/Antiplatelets:   no anticoagulation    Allergies:   Review of  patient's allergies indicates:   Allergen Reactions    Milk containing products      Causes GI distress       Sedation History:    no adverse reactions    Review of Systems:   As documented in primary provider H&P.        OBJECTIVE:     Vital Signs (Most Recent):  Temp: 97.6 °F (36.4 °C) (11/25/19 0807)  Pulse: 81 (11/25/19 0807)  Resp: 14 (11/25/19 0807)  BP: (!) 162/74 (11/25/19 0808)  SpO2: 100 % (11/25/19 0807)    Physical Exam:  No acute distress, laying comfortably in bed, pleasant and cooperative  Regular rate and rhythm  Breathing unlabored  Abdomen benign  Extremities warm and well perfused    ASA: 3  Mallampati: 2      Laboratory  Lab Results   Component Value Date    INR 1.0 10/07/2019       Lab Results   Component Value Date    WBC 11.63 10/28/2019    HGB 11.8 (L) 10/28/2019    HCT 37.2 10/28/2019    MCV 83 10/28/2019     10/28/2019      Lab Results   Component Value Date     (H) 11/21/2019     11/21/2019    K 5.1 11/21/2019     11/21/2019    CO2 24 11/21/2019    BUN 33 (H) 11/21/2019    CREATININE 1.2 11/21/2019    CALCIUM 10.4 11/21/2019    MG 1.9 11/13/2019    ALT 16 11/21/2019    AST 16 11/21/2019    ALBUMIN 3.6 11/21/2019    BILITOT 0.6 11/21/2019    BILIDIR 0.3 07/01/2019         ASSESSMENT/PLAN:     Sedation Plan: local only, possible moderate  Patient will undergo: LEFT nephrostomy tube check and change      Aime Alexandre MD  [facility], Diagnostic and Interventional Radiology  Pager: [phone]

## 2019-11-25 NOTE — DISCHARGE SUMMARY
Radiology Discharge Summary      Hospital Course: No complications    Admit Date: 11/25/2019  Discharge Date: 11/25/2019     Instructions Given to Patient: Yes  Diet: Resume prior diet  Activity: activity as tolerated and no driving for today    Description of Condition on Discharge: Stable  Vital Signs (Most Recent): Temp: 97.7 °F (36.5 °C) (11/25/19 1100)  Pulse: 75 (11/25/19 1200)  Resp: 16 (11/25/19 1200)  BP: 124/66 (11/25/19 1200)  SpO2: 100 % (11/25/19 1200)    Discharge Disposition: Home    Discharge Diagnosis: ureteral injury with left PCN tube leak now s/p PCN replacement/upsizing     Follow-up: with referring MD Denzel Doherty MD  Staff Radiologist  Department of Radiology  Pager: [phone]

## 2019-11-25 NOTE — PROGRESS NOTES
Pt arrived to [facility] via stretcher on RA, pt alert, report received from Melony HERRON, neph tube draining light pink color, dressing CDI

## 2019-11-25 NOTE — PROCEDURES
Radiology Post-Procedure Note    Pre Op Diagnosis: left hydronephrosis, ureteral injury    Post Op Diagnosis: left hydronephrosis    Procedure:left percutaneous nephrostomy tube change/upsize    Procedure performed by: Bessy ORTEGA, Corine Mahoney    Written Informed Consent Obtained: Yes    Specimen Removed: YES old 8F PCN tube    Estimated Blood Loss: Minimal    Findings: Local anesthesia and moderate sedation were used.      The indwelling nephrostomy tube was removed over a wire and a new 10F drainage catheter was placed under fluoroscopic guidance.  The drain was secured in place and dressed.      There were no complications and the patient tolerated the procedure well.  Please see Imaging report for further details.      Denzel Doherty MD  Staff Radiologist  Department of Radiology  Pager: [phone]

## 2019-11-25 NOTE — PLAN OF CARE
Pt to IRR-190. Order,consent and labs reviewed. Pt turned self prone onto procedure table. Covered w/ warm blanket. Comfortable. Monitored.

## 2019-11-27 ENCOUNTER — OFFICE VISIT (OUTPATIENT)
Dept: OPTOMETRY | Facility: CLINIC | Age: 59
End: 2019-11-27
Payer: MEDICARE

## 2019-11-27 ENCOUNTER — TELEPHONE (OUTPATIENT)
Dept: NEUROSURGERY | Facility: CLINIC | Age: 59
End: 2019-11-27

## 2019-11-27 DIAGNOSIS — Z96.1 PSEUDOPHAKIA OF BOTH EYES: ICD-10-CM

## 2019-11-27 DIAGNOSIS — H52.7 REFRACTIVE ERROR: ICD-10-CM

## 2019-11-27 DIAGNOSIS — E11.3393 MODERATE NONPROLIFERATIVE DIABETIC RETINOPATHY OF BOTH EYES WITHOUT MACULAR EDEMA ASSOCIATED WITH TYPE 2 DIABETES MELLITUS: Primary | ICD-10-CM

## 2019-11-27 PROCEDURE — 92014 PR EYE EXAM, EST PATIENT,COMPREHESV: ICD-10-PCS | Mod: S$GLB,,, | Performed by: OPTOMETRIST

## 2019-11-27 PROCEDURE — 99999 PR PBB SHADOW E&M-EST. PATIENT-LVL II: ICD-10-PCS | Mod: PBBFAC,,, | Performed by: OPTOMETRIST

## 2019-11-27 PROCEDURE — 92014 COMPRE OPH EXAM EST PT 1/>: CPT | Mod: S$GLB,,, | Performed by: OPTOMETRIST

## 2019-11-27 PROCEDURE — 92015 DETERMINE REFRACTIVE STATE: CPT | Mod: S$GLB,,, | Performed by: OPTOMETRIST

## 2019-11-27 PROCEDURE — 92015 PR REFRACTION: ICD-10-PCS | Mod: S$GLB,,, | Performed by: OPTOMETRIST

## 2019-11-27 PROCEDURE — 99999 PR PBB SHADOW E&M-EST. PATIENT-LVL II: CPT | Mod: PBBFAC,,, | Performed by: OPTOMETRIST

## 2019-11-27 NOTE — PROGRESS NOTES
HPI     LARA 04/2018 Diabetic  yesterday.  Glasses are about 1 yr. Old and   are reading only.  Reading with glasses seems fine. Patient is Noticing   that  Distance is not as clear. Not using any drops.    Hemoglobin A1C       Date                     Value               Ref Range             Status                08/22/2019               7.4 (H)             4.0 - 5.6 %           Final                05/24/2019               5.4                 4.0 - 5.6 %           Final              02/19/2019               10.8 (H)            4.0 - 5.6 %           Final                  Last edited by Arielle Spicer on 11/27/2019  1:14 PM. (History)            Assessment /Plan     For exam results, see Encounter Report.    Moderate nonproliferative diabetic retinopathy of both eyes without macular edema associated with type 2 diabetes mellitus    Pseudophakia of both eyes    Refractive error      1. Refer to [facility] for eval, worsening of retinopathy over last year , recent in hospital for blood issues.  2. Monitor condition. Patient to report any changes. RTC 1 year recheck.  3. New Spec Rx given. Different lens options discussed with patient. RTC 1 year full exam.

## 2019-11-27 NOTE — TELEPHONE ENCOUNTER
Left message to contact clinic re: scheduled appt on 12/02/19 with MD. Need to reschedule- Dr George will be out.

## 2019-12-03 ENCOUNTER — CLINICAL SUPPORT (OUTPATIENT)
Dept: REHABILITATION | Facility: HOSPITAL | Age: 59
End: 2019-12-03
Attending: NEUROLOGICAL SURGERY
Payer: COMMERCIAL

## 2019-12-03 ENCOUNTER — DOCUMENTATION ONLY (OUTPATIENT)
Dept: REHABILITATION | Facility: HOSPITAL | Age: 59
End: 2019-12-03

## 2019-12-03 DIAGNOSIS — R26.89 POOR BALANCE: ICD-10-CM

## 2019-12-03 DIAGNOSIS — R29.898 WEAKNESS OF BOTH LOWER EXTREMITIES: ICD-10-CM

## 2019-12-03 PROCEDURE — 97110 THERAPEUTIC EXERCISES: CPT | Mod: PO,GP

## 2019-12-03 PROCEDURE — G8978 MOBILITY CURRENT STATUS: HCPCS | Mod: CK,PO

## 2019-12-03 PROCEDURE — G8979 MOBILITY GOAL STATUS: HCPCS | Mod: CK,PO

## 2019-12-03 NOTE — PROGRESS NOTES
"  Physical Therapy Daily Treatment Note     Name: Mariann Huff  Clinic Number: [id number]    Therapy Diagnosis:   Encounter Diagnoses   Name Primary?    Weakness of both lower extremities     Poor balance      Physician: Mk George MD    Visit Date: 12/3/2019     Physician Orders: PT Eval and Treat   Medical Diagnosis from Referral:   Z98.1 (ICD-10-CM) - S/P lumbar fusion   M53.3 (ICD-10-CM) - Sacroiliac joint dysfunction of both sides   Evaluation Date: 11/11/2019  Authorization Period Expiration: 10/17/2020  Plan of Care Expiration: 01/11/2019  Visit # / Visits authorized: 2/20       Time In: 8:00 am  Time Out: 8:38 am  Total Billable Time: 38 minutes    Precautions: Standard    Subjective     Pt reports: she is not having much pain this morning, but has been under the weather lately   She was not compliant with home exercise program.  Response to previous treatment: less overall pain   Functional change: no longer needs an AD for ambulation     Pain: 0/10  Location: bilateral back      Objective     Mariann received therapeutic exercises to develop strength, endurance, ROM, flexibility, posture and core stabilization for 38 minutes including:         Date  12/3/19   VISIT 2/20   G CODE VISIT 1/10  01/03/19   POC EXP. DATE 01/11/19   VISIT AMOUNT  MEDICARE TOTAL 90.96  204.16   FACE-TO-FACE 01/03/19         TABLE:     HSS w/ strap 10 x 10"   Bridge  1 x 15 w/ball   LTR 1 x 15    Hip abduction  1 x 10 SL   Hip adduction  1 x 10 SL   SAQ 1 x 15 x 1#   SLR 1 x 15 x 1#          SEATED:     LAQs 1 x 15 x 1#   Seated Hip Flex 1 x 15 x 1#    Standing HS Curl  1 x 15 x 1#          STANDING:     Mini squats 1 x 10 On foam    Hip ABD 1 x 10 YTB   Hip Ext  1 x 10 YTB   Side Stepping in squat   Next    Tandem Stance  2 x 30"   Lateral Step Ups L2 - NT   Standing Lat Pull Downs 1 x 15 RTB         Initials BJ         Home Exercises and Patient Education Provided     Education provided:   - importance of performing " HEP with or without therapy      Written Home Exercises Provided: yes.  Exercises were reviewed and Mariann was able to demonstrate them prior to the end of the session. Mariann demonstrated good  understanding of the education provided.      See EMR under Patient Instructions for exercises provided 11/11/2019.    Assessment     Mariann completed the above exercise with verbal and tactile cueing to perform with proper form and correct technique. Patient demonstrated no increase in symptoms prior to leaving the clinic.     Mariann is progressing well towards her goals.   Pt prognosis is Good.     Pt will continue to benefit from skilled outpatient physical therapy to address the deficits listed in the problem list box on initial evaluation, provide pt/family education and to maximize pt's level of independence in the home and community environment.     Pt's spiritual, cultural and educational needs considered and pt agreeable to plan of care and goals.     Anticipated barriers to physical therapy: none    Goals:   Short Term Goals: 4 weeks   Patient will be able to stand for 15-30 minutes consecutively with min-no pain/difficulty in order to prepare full meal. IN PROGRESS  Patient will be able to ambulate for 15-30 minutes consecutively with min-no pain/difficulty in order grocery shop.  IN PROGRESS  Patient will be able to sit for 30 minutes - 1 hour consecutively with min-no pain/difficulty in order to watch one TV Shop with family. IN PROGRESS     Long Term Goals: 8 weeks   Patient will be able to stand for 30 minutes-1 hour consecutively with min-no pain/difficulty in order to prepare full meal. IN PROGRESS  Patient will be able to ambulate 1 hour consecutively with min-no pain/difficulty in order grocery shop.  IN PROGRESS  Patient will be able to sit for 1-2 hours consecutively with min-no pain/difficulty in order to watch one TV Shop with family.  IN PROGRESS    Plan     Perform side squatting next visit.      Mallory Dunne, PT, DPT

## 2019-12-03 NOTE — PROGRESS NOTES
Face to Face PTA Conference performed with Gualberto Murdock PTA regarding patient's current status, overall progress, and plan of care    Face to Face PTA Conference performed with Mallory Dunne PT regarding patient's current status, overall progress, and plan of care    Gualberto Murdock  12/3/2019

## 2019-12-05 ENCOUNTER — TELEPHONE (OUTPATIENT)
Dept: PREADMISSION TESTING | Facility: HOSPITAL | Age: 59
End: 2019-12-05

## 2019-12-05 ENCOUNTER — TELEPHONE (OUTPATIENT)
Dept: INTERNAL MEDICINE | Facility: CLINIC | Age: 59
End: 2019-12-05

## 2019-12-05 NOTE — ANESTHESIA PAT ROS NOTE
12/05/2019  Mariann Huff is a 59 y.o., female.      Pre-op Assessment         Review of Systems  Anesthesia Hx:  No problems with previous Anesthesia  History of prior surgery of interest to airway management or planning: tracheostomy. Previous anesthesia: General 5/24/2019 Colonoscopy with general anesthesia.  Procedure performed at an [facility]. Denies Family Hx of Anesthesia complications.   Denies Personal Hx of Anesthesia complications.   Social:  No Alcohol Use, Non-Smoker    Hematology/Oncology:  Hematology Normal   Oncology Normal     EENT/Dental:EENT/Dental Normal   Cardiovascular:    Denies Angina. hyperlipidemia  Functional Capacity 3.5 METS, uses cane  Coronary Artery Disease: Hx of Myocardial Infarction, NSTEMI  Congestive Heart Failure (CHF)  Hypertension , Well Controlled on Rx    Pulmonary:   Denies Shortness of breath.  Asthma:  last episode was > 1 year ago. Emergency visits this year is none.  Inhaler use is rescue inhaler PRN. Current breathing status is optimal, free of wheezing.  Obstructive Sleep Apnea (MURTAZA), CPAP prescribed. Has not used CPAP since 1990's       Renal/:  Other Renal / Gu Conditions: Hydronephrosis  Hepatic/GI:  Liver Disease, Fatty Liver (PFEIFFER)    Neurological:   S/P OLIF L5-S1 4/2019   Endocrine:  Diabetes, Type 2 Diabetes , controlled by diet, insulin. Typical AM glucose range:  , most recent HgA1c value was 7.4 on 8/22/2019.    Psych:  Psychiatric Normal              Anesthesia Assessment: Preoperative EQUATION    Planned Procedure: Procedure(s) (LRB):  CYSTOGRAM INTRAOP (N/A)  Nephrostogram - antegrade (Left)  REPLACEMENT, NEPHROSTOMY TUBE IR TO DO (Left)  Requested Anesthesia Type:General  Surgeon: Robin Boyd MD  Service: Urology  Known or anticipated Date of Surgery:12/11/2019    Surgeon notes: reviewed    Electronic QUestionnaire Assessment  completed via nurse interview with patient.        Triage considerations:     The patient has no apparent active cardiac condition (No unstable coronary Syndrome such as severe unstable angina or recent [<1 month] myocardial infarction, decompensated CHF, severe valvular   disease or significant arrhythmia)    Previous anesthesia records:GETA and No problems  5/24/2019 Colonoscopy  Airway/Jaw/Neck:  Airway Findings: Tongue: Large Pre-Existing Airway Tube(s): Tracheostomy tube  Size: 8.0  General Airway Assessment: Adult  Mallampati: II  TM Distance: 4 - 6 cm   Trach collar        Last PCP note: within 1 month , within [facility]  Subspecialty notes: Cardiology: General, Neurosurgery, Urology    Other important co-morbidities: DM2, HLD, HTN, MURTAZA and Hydronephrosis, Hx NSTEMI      Tests already available:  Available tests,  within 1 month , within [facility] .  11/25/2019 PT/INR; 11/21/2019 CMP; 10/28/2019 CBC, EKG; 8/22/2019 A1c, TSH, Lipid Panel; 6/7/2019 Echo/TTE             Instructions given. (See in Nurse's note)    Optimization:  Anesthesia Preop Clinic Assessment  Indicated: Not required for this procedure.    Medical Opinion Indicated: TBCB Card Dr. Aguilar and PCP Dr. Haney           Plan:    Testing:  None Needed     Consultation:Patient's PCP for a statement of optimization      Patient  has previously scheduled Medical Appointment:  12/9/2019    Navigation:  Consults scheduled.              Results will be tracked by Preop Clinic.    12/5/2019 From: Adelaide Aguilar MD     Patient is cleared for the procedure. ASA can be held 7 days and Plavix 5 days prior to the procedure. And restarted ASAP    12/6/2019  Clearance per Dr. Haney:  Surgery is not emergent.  Patient has no active cardiac conditions. Surgery is not low risk.  Patient does not have greater than 4 METs.  Patient has been cleared by Cardiology from cardiac standpoint.  May proceed with planned procedure  (The patient was seen by   Lyndon on 12/6/2019, please refer to additional recommendations for the Perioperative Procedure.)

## 2019-12-05 NOTE — TELEPHONE ENCOUNTER
----- Message from Yareli Vaca RN sent at 12/5/2019 10:25 AM CST -----  This patient is scheduled for Cystogram, Nephrostogram and Nephrostomy tube replacement with Dr. Boyd on 12/11/2019.  (General anesthesia, 90 minutes).  Anesthesia review is in progress.     Is patient optimized/cleared?  Please advise.    Thank you,  Yareli Vaca RN  [facility]

## 2019-12-05 NOTE — TELEPHONE ENCOUNTER
----- Message from Cynthia Smith MA sent at 12/5/2019 10:32 AM CST -----      ----- Message -----  From: Adelaide Aguilar MD  Sent: 12/5/2019  10:31 AM CST  To: Cynthia Smith MA    Patient is cleared for the procedure. ASA can be held 7 days and Plavix 5 days prior to the procedure. And restarted ASAP  ----- Message -----  From: Cynthia Smith MA  Sent: 12/5/2019  10:17 AM CST  To: Adelaide Aguilar MD        ----- Message -----  From: Yareli Vaca RN  Sent: 12/5/2019  10:03 AM CST  To: Adelaide Aguilar MD, #    This patient is scheduled for Cystogram, Nephrostogram and Nephrostomy tube replacement with Dr. Boyd on 12/11/2019.  (General anesthesia, 90 minutes).  Anesthesia review is in progress.    Is patient optimized/cleared?  Please advise.  Please provide ASA and Plavix instructions.    Thank you,  Yareli Vaca, GOPAL  Anesthesia [facility], [facility] [place]

## 2019-12-06 ENCOUNTER — OFFICE VISIT (OUTPATIENT)
Dept: INTERNAL MEDICINE | Facility: CLINIC | Age: 59
End: 2019-12-06
Payer: MEDICARE

## 2019-12-06 ENCOUNTER — LAB VISIT (OUTPATIENT)
Dept: LAB | Facility: HOSPITAL | Age: 59
End: 2019-12-06
Attending: INTERNAL MEDICINE
Payer: MEDICARE

## 2019-12-06 VITALS
BODY MASS INDEX: 25.29 KG/M2 | WEIGHT: 148.13 LBS | HEIGHT: 64 IN | TEMPERATURE: 98 F | DIASTOLIC BLOOD PRESSURE: 80 MMHG | RESPIRATION RATE: 18 BRPM | HEART RATE: 68 BPM | SYSTOLIC BLOOD PRESSURE: 128 MMHG

## 2019-12-06 DIAGNOSIS — I50.42 CHRONIC COMBINED SYSTOLIC AND DIASTOLIC CHF (CONGESTIVE HEART FAILURE): ICD-10-CM

## 2019-12-06 DIAGNOSIS — E11.59 HYPERTENSION ASSOCIATED WITH DIABETES: Chronic | ICD-10-CM

## 2019-12-06 DIAGNOSIS — Z93.6 NEPHROSTOMY STATUS: Chronic | ICD-10-CM

## 2019-12-06 DIAGNOSIS — I15.2 HYPERTENSION ASSOCIATED WITH DIABETES: Chronic | ICD-10-CM

## 2019-12-06 DIAGNOSIS — I25.10 CORONARY ARTERY DISEASE INVOLVING NATIVE CORONARY ARTERY OF NATIVE HEART WITHOUT ANGINA PECTORIS: Chronic | ICD-10-CM

## 2019-12-06 DIAGNOSIS — I50.30 HEART FAILURE WITH PRESERVED EJECTION FRACTION, UNSPECIFIED HF CHRONICITY: Chronic | ICD-10-CM

## 2019-12-06 DIAGNOSIS — E78.2 MIXED HYPERLIPIDEMIA: Chronic | ICD-10-CM

## 2019-12-06 DIAGNOSIS — Z01.818 PRE-OP EVALUATION: Primary | ICD-10-CM

## 2019-12-06 LAB
ESTIMATED AVG GLUCOSE: 217 MG/DL (ref 68–131)
HBA1C MFR BLD HPLC: 9.2 % (ref 4–5.6)

## 2019-12-06 PROCEDURE — G0008 ADMIN INFLUENZA VIRUS VAC: HCPCS | Mod: S$GLB,,, | Performed by: INTERNAL MEDICINE

## 2019-12-06 PROCEDURE — 99214 OFFICE O/P EST MOD 30 MIN: CPT | Mod: 25,S$GLB,, | Performed by: INTERNAL MEDICINE

## 2019-12-06 PROCEDURE — G0009 ADMIN PNEUMOCOCCAL VACCINE: HCPCS | Mod: 59,S$GLB,, | Performed by: INTERNAL MEDICINE

## 2019-12-06 PROCEDURE — 83036 HEMOGLOBIN GLYCOSYLATED A1C: CPT

## 2019-12-06 PROCEDURE — 99999 PR PBB SHADOW E&M-EST. PATIENT-LVL V: CPT | Mod: PBBFAC,,, | Performed by: INTERNAL MEDICINE

## 2019-12-06 PROCEDURE — 99214 PR OFFICE/OUTPT VISIT, EST, LEVL IV, 30-39 MIN: ICD-10-PCS | Mod: 25,S$GLB,, | Performed by: INTERNAL MEDICINE

## 2019-12-06 PROCEDURE — 90732 PPSV23 VACC 2 YRS+ SUBQ/IM: CPT | Mod: S$GLB,,, | Performed by: INTERNAL MEDICINE

## 2019-12-06 PROCEDURE — 3079F PR MOST RECENT DIASTOLIC BLOOD PRESSURE 80-89 MM HG: ICD-10-PCS | Mod: CPTII,S$GLB,, | Performed by: INTERNAL MEDICINE

## 2019-12-06 PROCEDURE — G0008 FLU VACCINE (QUAD) GREATER THAN OR EQUAL TO 3YO PRESERVATIVE FREE IM: ICD-10-PCS | Mod: S$GLB,,, | Performed by: INTERNAL MEDICINE

## 2019-12-06 PROCEDURE — 3008F BODY MASS INDEX DOCD: CPT | Mod: CPTII,S$GLB,, | Performed by: INTERNAL MEDICINE

## 2019-12-06 PROCEDURE — 3074F SYST BP LT 130 MM HG: CPT | Mod: CPTII,S$GLB,, | Performed by: INTERNAL MEDICINE

## 2019-12-06 PROCEDURE — 90686 IIV4 VACC NO PRSV 0.5 ML IM: CPT | Mod: S$GLB,,, | Performed by: INTERNAL MEDICINE

## 2019-12-06 PROCEDURE — G0009 PNEUMOCOCCAL POLYSACCHARIDE VACCINE 23-VALENT =>2YO SQ IM: ICD-10-PCS | Mod: 59,S$GLB,, | Performed by: INTERNAL MEDICINE

## 2019-12-06 PROCEDURE — 90686 FLU VACCINE (QUAD) GREATER THAN OR EQUAL TO 3YO PRESERVATIVE FREE IM: ICD-10-PCS | Mod: S$GLB,,, | Performed by: INTERNAL MEDICINE

## 2019-12-06 PROCEDURE — 3079F DIAST BP 80-89 MM HG: CPT | Mod: CPTII,S$GLB,, | Performed by: INTERNAL MEDICINE

## 2019-12-06 PROCEDURE — 36415 COLL VENOUS BLD VENIPUNCTURE: CPT | Mod: PO

## 2019-12-06 PROCEDURE — 3008F PR BODY MASS INDEX (BMI) DOCUMENTED: ICD-10-PCS | Mod: CPTII,S$GLB,, | Performed by: INTERNAL MEDICINE

## 2019-12-06 PROCEDURE — 90732 PNEUMOCOCCAL POLYSACCHARIDE VACCINE 23-VALENT =>2YO SQ IM: ICD-10-PCS | Mod: S$GLB,,, | Performed by: INTERNAL MEDICINE

## 2019-12-06 PROCEDURE — 99999 PR PBB SHADOW E&M-EST. PATIENT-LVL V: ICD-10-PCS | Mod: PBBFAC,,, | Performed by: INTERNAL MEDICINE

## 2019-12-06 PROCEDURE — 3074F PR MOST RECENT SYSTOLIC BLOOD PRESSURE < 130 MM HG: ICD-10-PCS | Mod: CPTII,S$GLB,, | Performed by: INTERNAL MEDICINE

## 2019-12-06 NOTE — PROGRESS NOTES
Subjective:       Patient ID: Mariann Huff is a 59 y.o. female.    Chief Complaint: Pre-op Exam (Ostomy)    HPI     58 yo female here for pre-op evaluation of cystogram.    Denies CP/SOB/nausea/MI last 3 months, + heart failure (not active), never diagnosed with arrhythmias, never diagnosed with valvular heart disease.    RCRI criteria: + IDDM, + CAD, - CVA, - chronic renal insufficiency, - heart failure, - high risk surgery    Functional status: she does her housework.  She is able to walk through stores, but not the mall yet.  She does not garden. She has no stairs in her house.    Bleeding risk - history of bleeding with prior surgeries - none    History of prior anesthetic complications - none    - tobacco, - EtOH, - Illicit substances    Chronic conditions/medication usage - albuterol inhaler (use if needed), amlodipine 10 mg (take), aspirin 81 mg (may hold week prior to surgery), Lipitor 80 mg (take), Celebrex 200 mg (hold week before surgery), Plavix 75 mg (hold week prior to surgery), Lexapro 10 mg (take),  Lasix 40 mg (hold morning of surgery), hydrocodone 5/325 mg (take if needed), NovoLog (hold a.m. of surgery), Lopressor 25 mg b.i.d. (take), Zofran ODT (take if needed), Ambien 5 mg (take p.m. before surgery if necessary)    Chronic Steroid usage - once in last year    Review of Systems    Objective:      Physical Exam   Constitutional: She is oriented to person, place, and time. She appears well-developed and well-nourished.   HENT:   Head: Normocephalic and atraumatic.   Mouth/Throat: No oropharyngeal exudate.   Eyes: Pupils are equal, round, and reactive to light. EOM are normal. Right eye exhibits no discharge. Left eye exhibits no discharge. No scleral icterus.   Neck: Normal range of motion. Neck supple. No tracheal deviation present. No thyromegaly present.   Cardiovascular: Normal rate, regular rhythm and normal heart sounds. Exam reveals no gallop and no friction rub.   No murmur  heard.  Pulmonary/Chest: Effort normal and breath sounds normal. No respiratory distress. She has no wheezes. She has no rales. She exhibits no tenderness.   Abdominal: Soft. Bowel sounds are normal. She exhibits no distension and no mass. There is no tenderness. There is no rebound and no guarding.   Musculoskeletal: Normal range of motion. She exhibits no edema or tenderness.   Neurological: She is alert and oriented to person, place, and time.   Skin: Skin is warm and dry. No rash noted. No erythema. No pallor.   Psychiatric: She has a normal mood and affect. Her behavior is normal.   Vitals reviewed.      Assessment:       1. Pre-op evaluation    2. Chronic combined systolic and diastolic CHF (congestive heart failure)    3. Coronary artery disease involving native coronary artery of native heart without angina pectoris    4. Hypertension associated with diabetes    5. Mixed hyperlipidemia    6. Heart failure with preserved ejection fraction, unspecified HF chronicity    7. Nephrostomy status        Plan:       1.  Surgery is not emergent.  Patient has no active cardiac conditions.  Surgery is not low risk.  Patient does not have greater than 4 METs.  Patient has been cleared by Cardiology from cardiac standpoint.  May proceed with planned procedure.  2/6.  Continue Lasix 40 mg daily.  3.  Continue Lopressor 25 mg b.i.d., Lipitor 80 mg, aspirin 81 mg, Plavix 75 mg.  4.  Continue Norvasc 10 mg, Lopressor 25 mg b.i.d., losartan 50 mg.  5.  Continue Lipitor 80 mg.  7.  Per Urology.  8.  Check A1c.  9.  DVT prevention - Recommend use of TEDs, and early ambulation if able.  Consider short term use of anti-coagulation if appropriate post-op.  10.  Chronic medications - albuterol inhaler (use if needed), amlodipine 10 mg (take), aspirin 81 mg (may hold week prior to surgery), Lipitor 80 mg (take), Celebrex 200 mg (hold week before surgery), Plavix 75 mg (hold week prior to surgery), Lexapro 10 mg (take),  Lasix 40 mg  (hold morning of surgery), hydrocodone 5/325 mg (take if needed), NovoLog (hold a.m. of surgery), Lopressor 25 mg b.i.d. (take), Zofran ODT (take if needed), Ambien 5 mg (take p.m. before surgery if necessary)  11.  Minimize urinary catheter use.    Pneumovax given today.

## 2019-12-06 NOTE — PATIENT INSTRUCTIONS
albuterol inhaler (use if needed), amlodipine 10 mg (take), aspirin 81 mg (may hold week prior to surgery), Lipitor 80 mg (take), Celebrex 200 mg (hold week before surgery), Plavix 75 mg (hold week prior to surgery), Lexapro 10 mg (take),  Lasix 40 mg (hold morning of surgery), hydrocodone 5/325 mg (take if needed), NovoLog (hold a.m. of surgery), Lopressor 25 mg b.i.d. (take), Zofran OTD (take if needed), Ambien 5 mg (take p.m. before surgery if necessary)

## 2019-12-09 ENCOUNTER — TELEPHONE (OUTPATIENT)
Dept: NEUROSURGERY | Facility: CLINIC | Age: 59
End: 2019-12-09

## 2019-12-09 ENCOUNTER — CLINICAL SUPPORT (OUTPATIENT)
Dept: REHABILITATION | Facility: HOSPITAL | Age: 59
End: 2019-12-09
Attending: NEUROLOGICAL SURGERY
Payer: COMMERCIAL

## 2019-12-09 DIAGNOSIS — R29.898 WEAKNESS OF BOTH LOWER EXTREMITIES: ICD-10-CM

## 2019-12-09 DIAGNOSIS — R26.89 POOR BALANCE: ICD-10-CM

## 2019-12-09 PROCEDURE — 97110 THERAPEUTIC EXERCISES: CPT | Mod: PO

## 2019-12-09 NOTE — PROGRESS NOTES
"  Physical Therapy Daily Treatment Note     Name: Mariann Huff  Clinic Number: [id number]    Therapy Diagnosis:   Encounter Diagnoses   Name Primary?    Weakness of both lower extremities     Poor balance      Physician: Mk George MD    Visit Date: 12/9/2019     Physician Orders: PT Eval and Treat   Medical Diagnosis from Referral:   Z98.1 (ICD-10-CM) - S/P lumbar fusion   M53.3 (ICD-10-CM) - Sacroiliac joint dysfunction of both sides   Evaluation Date: 11/11/2019  Authorization Period Expiration: 10/17/2020  Plan of Care Expiration: 01/11/2019  Visit # / Visits authorized: 2/20       Time In: 1:00pm  Time Out: 1:40  Total Billable Time: 40 minutes    Precautions: Standard    Subjective     Pt reports: she is having dull ache across low back on this date.   She was not compliant with home exercise program.  Response to previous treatment: less overall pain   Functional change: no longer needs an AD for ambulation     Pain: 1/10  Location: bilateral back      Objective     Mariann received therapeutic exercises to develop strength, endurance, ROM, flexibility, posture and core stabilization for 40 minutes including:         Date  12/9/19 12/3/19   VISIT 3/20 2/20   G CODE VISIT 2/10  1/3/20 1/10  01/03/19   POC EXP. DATE 1/11/20 01/11/19   VISIT AMOUNT  MEDICARE TOTAL 90.96  295.12 90.96  204.16   FACE-TO-FACE 1/3/20 01/03/19          TABLE:      HSS w/ strap 10 x 10" 10 x 10"   Bridge  1 x 15 w/ball 1 x 15 w/ball   LTR 1 x 15 1 x 15    Hip abduction  1 x 15 SL 1 x 10 SL   Hip adduction  1 x 15 SL 1 x 10 SL   SAQ 1 x 15 x 1# 1 x 15 x 1#   SLR 1 x 15 x 1# 1 x 15 x 1#           SEATED:      LAQs 1 x 15 x 1# 1 x 15 x 1#   Seated Hip Flex 1 x 15 x 1# 1 x 15 x 1#    Standing HS Curl  1 x 15 YTB 1 x 15 x 1#           STANDING:      Mini squats 1 x 10 on Foam 1 x 10 On foam    Hip ABD 1 x 10 YTB 1 x 10 YTB   Hip Ext  1 x 10 YTB 1 x 10 YTB   Side Stepping in squat  --  Next    Tandem Stance  2 x 30" 2 x 30" "   Lateral Step Ups L1 1 x 10 L2 - NT   Standing Lat Pull Downs 1 x 15 RTB 1 x 15 RTB          Initials RAVI LYNCH         Home Exercises and Patient Education Provided     Education provided:   - importance of performing HEP with or without therapy      Written Home Exercises Provided: yes.  Exercises were reviewed and Mariann was able to demonstrate them prior to the end of the session. Mariann demonstrated good  understanding of the education provided.      See EMR under Patient Instructions for exercises provided 11/11/2019.    Assessment     Mariann continues to have difficulty performing tandem stance exercise as well as weakness in hip musculature limiting tolerance to progression on this date.  She will continue with plan of care to improve core stabilization strength to improve tolerance to standing and walking activities.     Mariann is progressing well towards her goals.   Pt prognosis is Good.     Pt will continue to benefit from skilled outpatient physical therapy to address the deficits listed in the problem list box on initial evaluation, provide pt/family education and to maximize pt's level of independence in the home and community environment.     Pt's spiritual, cultural and educational needs considered and pt agreeable to plan of care and goals.     Anticipated barriers to physical therapy: none    Goals:   Short Term Goals: 4 weeks   Patient will be able to stand for 15-30 minutes consecutively with min-no pain/difficulty in order to prepare full meal. IN PROGRESS  Patient will be able to ambulate for 15-30 minutes consecutively with min-no pain/difficulty in order grocery shop.  IN PROGRESS  Patient will be able to sit for 30 minutes - 1 hour consecutively with min-no pain/difficulty in order to watch one TV Shop with family. IN PROGRESS     Long Term Goals: 8 weeks   Patient will be able to stand for 30 minutes-1 hour consecutively with min-no pain/difficulty in order to prepare full meal. IN  PROGRESS  Patient will be able to ambulate 1 hour consecutively with min-no pain/difficulty in order grocery shop.  IN PROGRESS  Patient will be able to sit for 1-2 hours consecutively with min-no pain/difficulty in order to watch one TV Shop with family.  IN PROGRESS    Plan     Continue with PT POC.     Florin Perdomo, PT, DPT

## 2019-12-09 NOTE — TELEPHONE ENCOUNTER
Left message for the patient to return my call she needs a follow up appointment today per Dr George.

## 2019-12-10 ENCOUNTER — TELEPHONE (OUTPATIENT)
Dept: UROLOGY | Facility: CLINIC | Age: 59
End: 2019-12-10

## 2019-12-11 ENCOUNTER — HOSPITAL ENCOUNTER (OUTPATIENT)
Facility: HOSPITAL | Age: 59
Discharge: HOME | End: 2019-12-11
Attending: UROLOGY | Admitting: UROLOGY
Payer: MEDICARE

## 2019-12-11 ENCOUNTER — ANESTHESIA (OUTPATIENT)
Dept: SURGERY | Facility: HOSPITAL | Age: 59
End: 2019-12-11
Payer: MEDICARE

## 2019-12-11 ENCOUNTER — ANESTHESIA EVENT (OUTPATIENT)
Dept: SURGERY | Facility: HOSPITAL | Age: 59
End: 2019-12-11
Payer: MEDICARE

## 2019-12-11 VITALS
OXYGEN SATURATION: 100 % | BODY MASS INDEX: 24.59 KG/M2 | HEART RATE: 70 BPM | HEIGHT: 64 IN | TEMPERATURE: 98 F | SYSTOLIC BLOOD PRESSURE: 158 MMHG | WEIGHT: 144 LBS | RESPIRATION RATE: 18 BRPM | DIASTOLIC BLOOD PRESSURE: 72 MMHG

## 2019-12-11 DIAGNOSIS — Z93.6 NEPHROSTOMY STATUS: Chronic | ICD-10-CM

## 2019-12-11 DIAGNOSIS — S37.10XD LEFT URETERAL INJURY, SUBSEQUENT ENCOUNTER: Primary | ICD-10-CM

## 2019-12-11 DIAGNOSIS — S37.10XS: Chronic | ICD-10-CM

## 2019-12-11 DIAGNOSIS — N99.81 INTRAOPERATIVE URETERAL INJURY: Primary | ICD-10-CM

## 2019-12-11 DIAGNOSIS — E11.42 DIABETIC PERIPHERAL NEUROPATHY ASSOCIATED WITH TYPE 2 DIABETES MELLITUS: ICD-10-CM

## 2019-12-11 LAB
POCT GLUCOSE: 201 MG/DL (ref 70–110)
POCT GLUCOSE: 216 MG/DL (ref 70–110)

## 2019-12-11 PROCEDURE — 52005 CYSTO W/URTRL CATHJ: CPT | Mod: ,,, | Performed by: UROLOGY

## 2019-12-11 PROCEDURE — 52005 PR CYSTOURETHROSCOPY,URETER CATHETER: ICD-10-PCS | Mod: ,,, | Performed by: UROLOGY

## 2019-12-11 PROCEDURE — 74430 CONTRAST X-RAY BLADDER: CPT | Mod: 26,,, | Performed by: UROLOGY

## 2019-12-11 PROCEDURE — 63600175 PHARM REV CODE 636 W HCPCS: Performed by: STUDENT IN AN ORGANIZED HEALTH CARE EDUCATION/TRAINING PROGRAM

## 2019-12-11 PROCEDURE — D9220A PRA ANESTHESIA: ICD-10-PCS | Mod: CRNA,,, | Performed by: NURSE ANESTHETIST, CERTIFIED REGISTERED

## 2019-12-11 PROCEDURE — 25000003 PHARM REV CODE 250: Performed by: NURSE ANESTHETIST, CERTIFIED REGISTERED

## 2019-12-11 PROCEDURE — 71000033 HC RECOVERY, INTIAL HOUR: Performed by: UROLOGY

## 2019-12-11 PROCEDURE — 74430 PR  X-RAY CYSTOGRAM, MIN 3 VIEW: ICD-10-PCS | Mod: 26,,, | Performed by: UROLOGY

## 2019-12-11 PROCEDURE — D9220A PRA ANESTHESIA: ICD-10-PCS | Mod: ANES,,, | Performed by: ANESTHESIOLOGY

## 2019-12-11 PROCEDURE — C1758 CATHETER, URETERAL: HCPCS | Performed by: UROLOGY

## 2019-12-11 PROCEDURE — 50431 NJX PX NFROSGRM &/URTRGRM: CPT | Mod: 51,LT,, | Performed by: UROLOGY

## 2019-12-11 PROCEDURE — 71000015 HC POSTOP RECOV 1ST HR: Performed by: UROLOGY

## 2019-12-11 PROCEDURE — D9220A PRA ANESTHESIA: Mod: CRNA,,, | Performed by: NURSE ANESTHETIST, CERTIFIED REGISTERED

## 2019-12-11 PROCEDURE — D9220A PRA ANESTHESIA: Mod: ANES,,, | Performed by: ANESTHESIOLOGY

## 2019-12-11 PROCEDURE — 82962 GLUCOSE BLOOD TEST: CPT | Performed by: UROLOGY

## 2019-12-11 PROCEDURE — 25500020 PHARM REV CODE 255: Performed by: UROLOGY

## 2019-12-11 PROCEDURE — C1769 GUIDE WIRE: HCPCS | Performed by: UROLOGY

## 2019-12-11 PROCEDURE — 37000009 HC ANESTHESIA EA ADD 15 MINS: Performed by: UROLOGY

## 2019-12-11 PROCEDURE — 37000008 HC ANESTHESIA 1ST 15 MINUTES: Performed by: UROLOGY

## 2019-12-11 PROCEDURE — 36000706: Performed by: UROLOGY

## 2019-12-11 PROCEDURE — 94761 N-INVAS EAR/PLS OXIMETRY MLT: CPT

## 2019-12-11 PROCEDURE — 50431 PR INJECTION PX NEPHROSTOGRAM &/ URETEROGRAM, EXISTING ACCESS, INCL GUID, S&I: ICD-10-PCS | Mod: 51,LT,, | Performed by: UROLOGY

## 2019-12-11 PROCEDURE — 36000707: Performed by: UROLOGY

## 2019-12-11 PROCEDURE — 63600175 PHARM REV CODE 636 W HCPCS: Performed by: NURSE ANESTHETIST, CERTIFIED REGISTERED

## 2019-12-11 RX ORDER — FENTANYL CITRATE 50 UG/ML
INJECTION, SOLUTION INTRAMUSCULAR; INTRAVENOUS
Status: DISCONTINUED | OUTPATIENT
Start: 2019-12-11 | End: 2019-12-11

## 2019-12-11 RX ORDER — PHENYLEPHRINE HYDROCHLORIDE 10 MG/ML
INJECTION INTRAVENOUS
Status: DISCONTINUED | OUTPATIENT
Start: 2019-12-11 | End: 2019-12-11

## 2019-12-11 RX ORDER — DEXAMETHASONE SODIUM PHOSPHATE 4 MG/ML
INJECTION, SOLUTION INTRA-ARTICULAR; INTRALESIONAL; INTRAMUSCULAR; INTRAVENOUS; SOFT TISSUE
Status: DISCONTINUED | OUTPATIENT
Start: 2019-12-11 | End: 2019-12-11

## 2019-12-11 RX ORDER — LIDOCAINE HCL/PF 100 MG/5ML
SYRINGE (ML) INTRAVENOUS
Status: DISCONTINUED | OUTPATIENT
Start: 2019-12-11 | End: 2019-12-11

## 2019-12-11 RX ORDER — FENTANYL CITRATE 50 UG/ML
25 INJECTION, SOLUTION INTRAMUSCULAR; INTRAVENOUS EVERY 5 MIN PRN
Status: DISCONTINUED | OUTPATIENT
Start: 2019-12-11 | End: 2019-12-11 | Stop reason: HOSPADM

## 2019-12-11 RX ORDER — PROPOFOL 10 MG/ML
VIAL (ML) INTRAVENOUS
Status: DISCONTINUED | OUTPATIENT
Start: 2019-12-11 | End: 2019-12-11

## 2019-12-11 RX ORDER — ONDANSETRON 2 MG/ML
INJECTION INTRAMUSCULAR; INTRAVENOUS
Status: DISCONTINUED | OUTPATIENT
Start: 2019-12-11 | End: 2019-12-11

## 2019-12-11 RX ORDER — ROCURONIUM BROMIDE 10 MG/ML
INJECTION, SOLUTION INTRAVENOUS
Status: DISCONTINUED | OUTPATIENT
Start: 2019-12-11 | End: 2019-12-11

## 2019-12-11 RX ORDER — SODIUM CHLORIDE 0.9 % (FLUSH) 0.9 %
10 SYRINGE (ML) INJECTION
Status: DISCONTINUED | OUTPATIENT
Start: 2019-12-11 | End: 2019-12-11 | Stop reason: HOSPADM

## 2019-12-11 RX ORDER — MIDAZOLAM HYDROCHLORIDE 1 MG/ML
INJECTION, SOLUTION INTRAMUSCULAR; INTRAVENOUS
Status: DISCONTINUED | OUTPATIENT
Start: 2019-12-11 | End: 2019-12-11

## 2019-12-11 RX ORDER — CIPROFLOXACIN 2 MG/ML
400 INJECTION, SOLUTION INTRAVENOUS
Status: COMPLETED | OUTPATIENT
Start: 2019-12-11 | End: 2019-12-11

## 2019-12-11 RX ORDER — SODIUM CHLORIDE 9 MG/ML
INJECTION, SOLUTION INTRAVENOUS CONTINUOUS PRN
Status: DISCONTINUED | OUTPATIENT
Start: 2019-12-11 | End: 2019-12-11

## 2019-12-11 RX ADMIN — PHENYLEPHRINE HYDROCHLORIDE 100 MCG: 10 INJECTION INTRAVENOUS at 08:12

## 2019-12-11 RX ADMIN — MIDAZOLAM HYDROCHLORIDE 2 MG: 1 INJECTION, SOLUTION INTRAMUSCULAR; INTRAVENOUS at 08:12

## 2019-12-11 RX ADMIN — CIPROFLOXACIN 400 MG: 2 INJECTION, SOLUTION INTRAVENOUS at 08:12

## 2019-12-11 RX ADMIN — PROPOFOL 120 MG: 10 INJECTION, EMULSION INTRAVENOUS at 08:12

## 2019-12-11 RX ADMIN — SODIUM CHLORIDE: 0.9 INJECTION, SOLUTION INTRAVENOUS at 08:12

## 2019-12-11 RX ADMIN — FENTANYL CITRATE 100 MCG: 50 INJECTION, SOLUTION INTRAMUSCULAR; INTRAVENOUS at 08:12

## 2019-12-11 RX ADMIN — ONDANSETRON 4 MG: 2 INJECTION INTRAMUSCULAR; INTRAVENOUS at 08:12

## 2019-12-11 RX ADMIN — SUGAMMADEX 200 MG: 100 INJECTION, SOLUTION INTRAVENOUS at 09:12

## 2019-12-11 RX ADMIN — ROCURONIUM BROMIDE 40 MG: 10 INJECTION, SOLUTION INTRAVENOUS at 08:12

## 2019-12-11 RX ADMIN — DEXAMETHASONE SODIUM PHOSPHATE 8 MG: 4 INJECTION, SOLUTION INTRAMUSCULAR; INTRAVENOUS at 08:12

## 2019-12-11 RX ADMIN — LIDOCAINE HYDROCHLORIDE 100 MG: 20 INJECTION, SOLUTION INTRAVENOUS at 08:12

## 2019-12-11 NOTE — DISCHARGE INSTRUCTIONS
Anesthesia: General Anesthesia     You are watched continuously during your procedure by your anesthesia provider.     Youre due to have surgery. During surgery, youll be given medicine called anesthesia or anesthetic. This will keep you comfortable and pain-free. Your anesthesia provider will use general anesthesia.  What is general anesthesia?  General anesthesia puts you into a state like deep sleep. It goes into the bloodstream (IV anesthetics), into the lungs (gas anesthetics), or both. You feel nothing during the procedure. You will not remember it. During the procedure, the anesthesia provider monitors you continuously. He or she checks your heart rate and rhythm, blood pressure, breathing, and blood oxygen.  · IV anesthetics. IV anesthetics are given through an IV line in your arm. Theyre often given first. This is so you are asleep before a gas anesthetic is started. Some kinds of IV anesthetics relieve pain. Others relax you. Your doctor will decide which kind is best in your case.  · Gas anesthetics. Gas anesthetics are breathed into the lungs. They are often used to keep you asleep. They can be given through a facemask or a tube placed in your larynx or trachea (breathing tube).  ¨ If you have a facemask, your anesthesia provider will most likely place it over your nose and mouth while youre still awake. Youll breathe oxygen through the mask as your IV anesthetic is started. Gas anesthetic may be added through the mask.  ¨ If you have a tube in the larynx or trachea, it will be inserted into your throat after youre asleep.  Anesthesia tools and medicines  You will likely have:  · IV anesthetics. These are put into an IV line into your bloodstream.  · Gas anesthetics. You breathe these anesthetics into your lungs, where they pass into your bloodstream.  · Pulse oximeter. This is a small clip that is attached to the end of your finger. This measures your blood oxygen level.  · Electrocardiography  leads (electrodes). These are small sticky pads that are placed on your chest. They record your heart rate and rhythm.  · Blood pressure cuff. This reads your blood pressure.  Risks and possible complications  General anesthesia has some risks. These include:  · Breathing problems  · Nausea and vomiting  · Sore throat or hoarseness (usually temporary)  · Allergic reaction to the anesthetic  · Irregular heartbeat (rare)  · Cardiac arrest (rare)   Anesthesia safety  · Follow all instructions you are given for how long not to eat or drink before your procedure.  · Be sure your doctor knows what medicines and drugs you take. This includes over-the-counter medicines, herbs, supplements, alcohol or other drugs. You will be asked when those were last taken.  · Have an adult family member or friend drive you home after the procedure.  · For the first 24 hours after your surgery:  ¨ Do not drive or use heavy equipment.  ¨ Do not make important decisions or sign legal documents. If important decisions or signing legal documents is necessary during the first 24 hours after surgery, have a trusted family member or spouse act on your behalf.  ¨ Avoid alcohol.  ¨ Have a responsible adult stay with you. He or she can watch for problems and help keep you safe.  Date Last Reviewed: 12/1/2016  © 5653-4072 [employer]. [address] [place] [address], [place] [id number]. All rights reserved. This information is not intended as a substitute for professional medical care. Always follow your healthcare professional's instructions.        Cystoscopy    Cystoscopy is a procedure that lets your doctor look directly inside your urethra and bladder. It can be used to:  · Help diagnose a problem with your urethra, bladder, or kidneys.  · Take a sample (biopsy) of bladder or urethral tissue.  · Treat certain problems (such as removing kidney stones).  · Place a stent to bypass an obstruction.  · Take special X-rays of the kidneys.  Based on  the findings, your doctor may recommend other tests or treatments.  What is a cystoscope?  A cystoscope is a telescope-like instrument that contains lenses and fiberoptics (small glass wires that make bright light). The cystoscope may be straight and rigid, or flexible to bend around curves in the urethra. The doctor may look directly into the cystoscope, or project the image onto a monitor.  Getting ready  · Ask your doctor if you should stop taking any medicines before the procedure.  · Ask whether you should avoid eating or drinking anything after midnight before the procedure.  · Follow any other instructions your doctor gives you.  Tell your doctor before the exam if you:  · Take any medicines, such as aspirin or blood thinners  · Have allergies to any medicines  · Are pregnant   The procedure  Cystoscopy is done in the doctors office, surgery center, or hospital. The doctor and a nurse are present during the procedure. It takes only a few minutes, longer if a biopsy, X-ray, or treatment needs to be done.  During the procedure:  · You lie on an exam table on your back, knees bent and legs apart. You are covered with a drape.  · Your urethra and the area around it are washed. Anesthetic jelly may be applied to numb the urethra. Other pain medicine is usually not needed. In some cases, you may be offered a mild sedative to help you relax. If a more extensive procedure is to be done, such as a biopsy or kidney stone removal, general anesthesia may be needed.  · The cystoscope is inserted. A sterile fluid is put into the bladder to expand it. You may feel pressure from this fluid.  · When the procedure is done, the cystoscope is removed.  After the procedure  If you had a sedative, general anesthesia, or spinal anesthesia, you must have someone drive you home. Once youre home:  · Drink plenty of fluids.  · You may have burning or light bleeding when you urinate--this is normal.  · Medicines may be prescribed to  ease any discomfort or prevent infection. Take these as directed.  · Call your doctor if you have heavy bleeding or blood clots, burning that lasts more than a day, a fever over 100°F  (38° C), or trouble urinating.  Date Last Reviewed: 1/1/2017  © 7297-1130 [employer]. [address] [place] [address], [place] [address]. All rights reserved. This information is not intended as a substitute for professional medical care. Always follow your healthcare professional's instructions.

## 2019-12-11 NOTE — ANESTHESIA PROCEDURE NOTES
Intubation  Performed by: Crystal Blankenship CRNA  Authorized by: Lauri Eddy Jr., MD     Intubation:     Induction:  Intravenous    Intubated:  Postinduction    Mask Ventilation:  Easy mask    Attempts:  1    Attempted By:  CRNA    Method of Intubation:  Direct    Blade:  Shahid 2    Laryngeal View Grade: Grade I - full view of chords      Difficult Airway Encountered?: No      Complications:  None    Airway Device:  Oral endotracheal tube    Airway Device Size:  7.0    Style/Cuff Inflation:  Cuffed    Tube secured:  22    Secured at:  The lips    Placement Verified By:  Capnometry    Complicating Factors:  None

## 2019-12-11 NOTE — ANESTHESIA PREPROCEDURE EVALUATION
12/11/2019  Pre-operative evaluation for Procedure(s) (LRB):  CYSTOGRAM INTRAOP (N/A)  Nephrostogram - antegrade (Left)  REPLACEMENT, NEPHROSTOMY TUBE IR TO DO (Left)    Mariann Huff is a 59 y.o. female hx of HTN, DM, CAD s/p stents with most recent in 2013, asthma well controlled, EF 40% who presents for the above procedure.  Previous trach during extended ICU stay that has been removed    · Mild left ventricular enlargement.  · Mildly decreased left ventricular systolic function. The estimated ejection fraction is 40-45%, quantitiative LVEF 38-40%  · Grade II (moderate) left ventricular diastolic dysfunction consistent with pseudonormalization.  · Elevated left atrial pressure.  · Mild-moderate left atrial enlargement.  · Local segmental wall motion abnormalities.  · Mechanically ventilated; cannot use inferior caval vein diameter to estimate central venous pressure.  · Normal right ventricular systolic function.  · Moderate mitral regurgitation.    Patient Active Problem List   Diagnosis    Hypertension associated with diabetes    Hyperlipidemia    CAD (coronary artery disease)    Sleep apnea    Carotid stenosis, bilateral    NSTEMI (non-ST elevated myocardial infarction)    S/P coronary artery stent placement    Nuclear sclerosis - Right Eye    Primary localized osteoarthrosis, lower leg    Chronic sinusitis    PSC (posterior subcapsular cataract), right    DME (diabetic macular edema)    Refractive error    Pseudophakia    Senile nuclear sclerosis    Type 2 diabetes mellitus with diabetic peripheral angiopathy without gangrene    Diabetic peripheral neuropathy associated with type 2 diabetes mellitus    Cervical arthritis    Neurogenic claudication    Lumbar herniated disc    Fatty liver disease, nonalcoholic    Arthritis of lumbar spine    Chronic pain    Lumbar radiculopathy     Lumbosacral radiculopathy at L5    Ureteral transection of left native kidney    Type 2 diabetes mellitus with stage 2 chronic kidney disease and hypertension    Asthma    Normocytic anemia    Bradycardia    Delayed surgical wound healing    Rectal ulcer    Palliative care encounter    Protein malnutrition    Acute deep vein thrombosis (DVT) of femoral vein of right lower extremity    Impaired functional mobility and endurance    (HFpEF) heart failure with preserved ejection fraction    Alteration in skin integrity    Debility    Staph infection    Chronic combined systolic and diastolic CHF (congestive heart failure)    Nephrostomy status    Nephrostomy tube displaced    Left ureteral injury    Hydronephrosis    Serum albumin decreased    Weakness of both lower extremities    Poor balance    Intraoperative ureteral injury       Review of patient's allergies indicates:   Allergen Reactions    Milk containing products      Causes GI distress       Current Facility-Administered Medications on File Prior to Encounter   Medication Dose Route Frequency Provider Last Rate Last Dose    lidocaine (PF) 10 mg/ml (1%) injection 10 mg  1 mL Intradermal Once Dave Bentley Jr., MD         Current Outpatient Medications on File Prior to Encounter   Medication Sig Dispense Refill    albuterol (PROVENTIL/VENTOLIN HFA) 90 mcg/actuation inhaler Inhale 2 puffs into the lungs every 6 (six) hours as needed for Wheezing. 1 each 11    albuterol-ipratropium (DUO-NEB) 2.5 mg-0.5 mg/3 mL nebulizer solution Inhale 3 mLs into the lungs.      amLODIPine (NORVASC) 10 MG tablet 10 mg by Tube route.      aspirin 81 mg Tab Take 81 mg by mouth. 1 Tablet Oral Every day      atorvastatin (LIPITOR) 80 MG tablet 1 tablet (80 mg total) by Per G Tube route once daily. 90 tablet 3    cefadroxil (DURICEF) 1 gram tablet TAKE 1 TABLET (1 G TOTAL) BY MOUTH 2 (TWO) TIMES DAILY.  3    escitalopram oxalate (LEXAPRO) 10 MG  tablet Take 1 tablet (10 mg total) by mouth once daily. 30 tablet 11    famotidine (PEPCID) 20 MG tablet Take 1 tablet (20 mg total) by mouth 2 (two) times daily. 60 tablet 11    furosemide (LASIX) 40 MG tablet Take 1 tablet (40 mg total) by mouth once daily. 30 tablet 11    insulin aspart U-100 (NOVOLOG) 100 unit/mL (3 mL) InPn pen Inject 1-10 Units into the skin 4 (four) times daily before meals and nightly. 12 mL 11    losartan (COZAAR) 50 MG tablet Take 1 tablet (50 mg total) by mouth once daily. 90 tablet 3    metoprolol tartrate (LOPRESSOR) 25 MG tablet Take 1 tablet (25 mg total) by mouth 2 (two) times daily. 60 tablet 11    multivitamin (THERAGRAN) per tablet Take 1 tablet by mouth once daily.      potassium chloride SA (K-DUR,KLOR-CON) 20 MEQ tablet Take 1 tablet (20 mEq total) by mouth 2 (two) times daily. 60 tablet 11    ACCU-CHEK EDIN PLUS METER Misc       blood sugar diagnostic (ACCU-CHEK EDIN PLUS TEST STRP) Strp TEST BLOOD SUGARS 4 TIMES DAILY 150 strip 11    celecoxib (CELEBREX) 200 MG capsule Take 1 capsule (200 mg total) by mouth 2 (two) times daily as needed for Pain. 60 capsule 2    gabapentin (NEURONTIN) 300 MG capsule Take 1 capsule (300 mg total) by mouth 2 (two) times daily. 60 capsule 11    HYDROcodone-acetaminophen (NORCO) 5-325 mg per tablet Take 1 tablet by mouth every 6 (six) hours as needed for Pain. 28 tablet 0    nitroGLYCERIN (NITROSTAT) 0.4 MG SL tablet Take one every 2-3 min till chestpain relief for 3 times and if still no relief, call MD or come to ED 30 tablet 11    ondansetron (ZOFRAN-ODT) 8 MG TbDL Take 1 tablet (8 mg total) by mouth every 12 (twelve) hours as needed. 30 tablet 2    rifAMpin (RIFADIN) 300 MG capsule          Past Surgical History:   Procedure Laterality Date    BRONCHOSCOPY N/A 5/2/2019    Procedure: BRONCHOSCOPY;  Surgeon: Sean Ruano MD;  Location: [facility] OR [address] [place] [address];  Service: General;  Laterality: N/A;    CARDIAC CATHETERIZATION       CATARACT EXTRACTION      cataract extraction left eye      cataracts      CAUDAL EPIDURAL STEROID INJECTION N/A 2019    Procedure: Injection-steroid-epidural-caudal;  Surgeon: Dave Bentley Jr., MD;  Location: [facility] PAIN MGT;  Service: Pain Management;  Laterality: N/A;     SECTION, LOW TRANSVERSE      COLONOSCOPY N/A 2019    Procedure: COLONOSCOPY;  Surgeon: Al Alaniz MD;  [facility]  Service: Endoscopy;  Laterality: N/A;    CORONARY ANGIOPLASTY      ESOPHAGOGASTRODUODENOSCOPY W/ PEG  2019    Procedure: EGD, WITH PEG TUBE INSERTION;  Surgeon: Sean Ruano MD;  [facility];  Service: General;;    EXCISION TURBINATE, SUBMUCOUS      FLEXIBLE SIGMOIDOSCOPY N/A 2019    Procedure: SIGMOIDOSCOPY, FLEXIBLE;  Surgeon: ALBERTO Amin MD;  [facility]  Service: Endoscopy;  Laterality: N/A;    FLEXIBLE SIGMOIDOSCOPY N/A 2019    Procedure: SIGMOIDOSCOPY, FLEXIBLE;  Surgeon: ALBERTO Amin MD;  [facility]  Service: Endoscopy;  Laterality: N/A;    FUSION OF LUMBAR SPINE BY ANTERIOR APPROACH Left 2019    Procedure: FUSION, SPINE, LUMBAR, ANTERIOR APPROACH Left L5-S1 Anterior to Psoa Interbody Fusion, L5-S1 Posterior Instrumentation;  Surgeon: Mk George MD;  Location: [facility] OR [address] [place] [address];  Service: Neurosurgery;  Laterality: Left;  Porcedure:  Left L5-S1 Anterior to Psoa Interbody Fusion, L5-S1 Posterior Instrumentation  Surgery Time: 4 Hrs  LOS: 2-3  Anesthesia: General   Blood: Type & Screen  R    HAND SURGERY Left     HAND SURGERY Right     torn ligament repair/ Dr. Yeboah    HYSTERECTOMY      left foot surgery      left wrist surgery      NASAL SEPTUM SURGERY  5/7/15    PERCUTANEOUS NEPHROSTOMY Left 2019    Procedure: Creation, Nephrostomy, Percutaneous;  Surgeon: Karina Surgeon;  Location: [facility];  Service: Anesthesiology;  Laterality: Left;    REPAIR OF URETER   4/12/2019    Procedure: REPAIR, URETER;  Surgeon: Mk George MD;  Location: [facility] OR 2ND FLR;  Service: Neurosurgery;;    REPLACEMENT OF NEPHROSTOMY TUBE N/A 7/18/2019    Procedure: REPLACEMENT, NEPHROSTOMY TUBE;  Surgeon: [facility] Diagnostic Provider;  Location: [facility] OR 2ND FLR;  Service: Anesthesiology;  Laterality: N/A;  188    REPLACEMENT OF NEPHROSTOMY TUBE N/A 7/24/2019    Procedure: REPLACEMENT, NEPHROSTOMY TUBE;  Surgeon: [facility] Diagnostic Provider;  Location: [facility] OR 2ND FLR;  Service: Anesthesiology;  Laterality: N/A;  188    REPLACEMENT OF NEPHROSTOMY TUBE N/A 10/7/2019    Procedure: REPLACEMENT, NEPHROSTOMY TUBE;  Surgeon: [facility] Diagnostic Provider;  Location: [facility] OR 2ND FLR;  Service: Anesthesiology;  Laterality: N/A;  189    REPLACEMENT OF NEPHROSTOMY TUBE N/A 11/25/2019    Procedure: REPLACEMENT, NEPHROSTOMY TUBE;  Surgeon: [facility] Diagnostic Provider;  [facility];  Service: Anesthesiology;  Laterality: N/A;  Room 188/Bessy    rt elbow surgery      S/P LAD COATED STENT  05/14/2010    6 total     S/P OM1 STENT  08/2003    SINUS SURGERY      F.E.S.S.    TRACHEOSTOMY N/A 5/2/2019    Procedure: CREATION, TRACHEOSTOMY;  Surgeon: Sean Ruano MD;  [facility];  Service: General;  Laterality: N/A;    TUBAL LIGATION         Social History     Socioeconomic History    Marital status:      Spouse name: Shamir    Number of children: 2    Years of education: Not on file    Highest education level: Not on file   Occupational History    Occupation: cafeteria     Employer: [employer]     Employer[place] [employer]     Employer: [place] [employer]   Social Needs    Financial resource strain: Not on file    Food insecurity:     Worry: Not on file     Inability: Not on file    Transportation needs:     Medical: Not on file     Non-medical: Not on file   Tobacco Use    Smoking status: Never Smoker    Smokeless tobacco: Never Used    Substance and Sexual Activity    Alcohol use: No     Alcohol/week: 0.0 standard drinks    Drug use: No    Sexual activity: Yes     Partners: Male     Birth control/protection: Post-menopausal     Comment:    Lifestyle    Physical activity:     Days per week: Not on file     Minutes per session: Not on file    Stress: Not on file   Relationships    Social connections:     Talks on phone: Not on file     Gets together: Not on file     Attends Holiness service: Not on file     Active member of club or organization: Not on file     Attends meetings of clubs or organizations: Not on file     Relationship status: Not on file   Other Topics Concern    Are you pregnant or think you may be? No    Breast-feeding No   Social History Narrative    . 2 children.          CBC: No results for input(s): WBC, RBC, HGB, HCT, PLT, MCV, MCH, MCHC in the last 72 hours.    CMP: No results for input(s): NA, K, CL, CO2, BUN, CREATININE, GLU, MG, PHOS, CALCIUM, ALBUMIN, PROT, ALKPHOS, ALT, AST, BILITOT in the last 72 hours.    INR  No results for input(s): PT, INR, PROTIME, APTT in the last 72 hours.        Diagnostic Studies:      EKG:  Normal sinus rhythm  ST and T wave abnormality, consider inferolateral ischemia  Prolonged QT  Abnormal ECG  When compared with ECG of 06-JUN-2019 16:23,  Inverted T waves have replaced nonspecific T wave abnormality in Inferior  leads  Confirmed by Peter BROWN MD ([id number]) on 6/7/2019 9:05:50 AM    2D Echo:  No results found. However, due to the size of the patient record, not all encounters were searched. Please check Results Review for a complete set of results.      Anesthesia Evaluation    I have reviewed the Patient Summary Reports.     I have reviewed the Nursing Notes.   I have reviewed the Medications.     Review of Systems  Anesthesia Hx:  No problems with previous Anesthesia  History of prior surgery of interest to airway management or planning: Denies Family Hx of Anesthesia  complications.   Denies Personal Hx of Anesthesia complications.   Hematology/Oncology:         -- Denies Anemia:   Cardiovascular:   Exercise tolerance: poor Hypertension Past MI CAD  CABG/stent  CHF ESPINOSA ECG has been reviewed.    Pulmonary:   Denies COPD. Asthma mild Sleep Apnea    Renal/:   Chronic Renal Disease    Hepatic/GI:   Denies GERD. Liver Disease,    Musculoskeletal:   Arthritis     Neurological:   Denies CVA. Denies Seizures.    Endocrine:   Diabetes, well controlled, using insulin        Physical Exam  General:  Well nourished, Obesity    Airway/Jaw/Neck:  Airway Findings: Mouth Opening: Normal Tongue: Normal  General Airway Assessment: Adult  Mallampati: III  Improves to III with phonation.  TM Distance: Normal, at least 6 cm  Jaw/Neck Findings:  Neck ROM: Normal ROM      Dental:  Dental Findings:    Chest/Lungs:  Chest/Lungs Findings: Clear to auscultation, Normal Respiratory Rate     Heart/Vascular:  Heart Findings: Rate: Normal  Rhythm: Regular Rhythm  Sounds: Normal        Mental Status:  Mental Status Findings:  Cooperative, Alert and Oriented         Anesthesia Plan  Type of Anesthesia, risks & benefits discussed:  Anesthesia Type:  general  Patient's Preference:   Intra-op Monitoring Plan: standard ASA monitors  Intra-op Monitoring Plan Comments:   Post Op Pain Control Plan: per primary service following discharge from PACU  Post Op Pain Control Plan Comments:   Induction:   IV  Beta Blocker:  Patient is on a Beta-Blocker and has received one dose within the past 24 hours (No further documentation required).       Informed Consent: Patient understands risks and agrees with Anesthesia plan.  Questions answered. Anesthesia consent signed with patient.  ASA Score: 3     Day of Surgery Review of History & Physical:            Ready For Surgery From Anesthesia Perspective.

## 2019-12-11 NOTE — OP NOTE
[facility]  Operative Note    Date: 12/11/2019    Pre-Op Diagnosis: history of ureteral injury, history of nephrostomy tube (left)    Patient Active Problem List   Diagnosis    Hypertension associated with diabetes    Hyperlipidemia    CAD (coronary artery disease)    Sleep apnea    Carotid stenosis, bilateral    NSTEMI (non-ST elevated myocardial infarction)    S/P coronary artery stent placement    Nuclear sclerosis - Right Eye    Primary localized osteoarthrosis, lower leg    Chronic sinusitis    PSC (posterior subcapsular cataract), right    DME (diabetic macular edema)    Refractive error    Pseudophakia    Senile nuclear sclerosis    Type 2 diabetes mellitus with diabetic peripheral angiopathy without gangrene    Diabetic peripheral neuropathy associated with type 2 diabetes mellitus    Cervical arthritis    Neurogenic claudication    Lumbar herniated disc    Fatty liver disease, nonalcoholic    Arthritis of lumbar spine    Chronic pain    Lumbar radiculopathy    Lumbosacral radiculopathy at L5    Ureteral transection of left native kidney    Type 2 diabetes mellitus with stage 2 chronic kidney disease and hypertension    Asthma    Normocytic anemia    Bradycardia    Delayed surgical wound healing    Rectal ulcer    Palliative care encounter    Protein malnutrition    Acute deep vein thrombosis (DVT) of femoral vein of right lower extremity    Impaired functional mobility and endurance    (HFpEF) heart failure with preserved ejection fraction    Alteration in skin integrity    Debility    Staph infection    Chronic combined systolic and diastolic CHF (congestive heart failure)    Nephrostomy status    Nephrostomy tube displaced    Left ureteral injury    Hydronephrosis    Serum albumin decreased    Weakness of both lower extremities    Poor balance    Intraoperative ureteral injury       Post-Op Diagnosis: same    Procedure(s) Performed:  1.  Cystoscopy with left RGP  2. Cystogram  3. Antegrade nephrostogram (Left)  3. Fluoro < 1 h    Specimen(s): none    Staff Surgeon: Robin Boyd MD    Assistant Surgeon: Hima Duncan MD    Anesthesia: Monitored Local Anesthesia with Sedation    Indications: Mariann Huff is a 59 y.o. female with history of ureteral injury during spine surgery who initially underwent ureteroureterostomy which eventually broke down and she had to be taken for ex lap and washout. She now has a left nephrostomy tube and presents today for cystoscopic evaluation as well as nephrostogram to determine if she is a candidate for ureteral reimplant.     Findings:   - Antegrade nephrostogram showed patent ureter down to level of L5 vertebra  - Left retrograde pyelogram revealed patent ureter up to pelvic brim, almost at the L5 level  - Wire could not be passed retrograde into proximal ureter  - 500 cc bladder capacity, normal bladder contour    Estimated Blood Loss: min    Drains: none    Procedure in Detail:  After risks, benefits and possible complications of the procedure were explained, the patient elected to undergo the procedure and informed consent was obtained. All questions were answered in the rebecca-operative area. The patient was transferred to the cystoscopy suite and placed in the supine position. SCDs were applied and working. Anesthesia was administered. Once the patient was adequately sedated, she was placed in the dorsal lithotomy position and prepped and draped in the usual sterile fashion. Time out was performed, rebecca-procedural antibiotics were confirmed.     A rigid cystoscope in a 22 Fr sheath was introduced into the bladder per urethra. This passed easily. The entire urethra was visualized which showed no masses or strictures. The right and left ureteral orifices were identified in the normal anatomic position and were seen effluxing clear urine. Cystoscopy did not reveal any suspicious lesions or abnormal findings.     A  5fr open tip catheter was used to perform a left RGP as well as a simultaneous antegrade nephrostogram on the left. This revealed contrast extending inferiorly to the level of the L5 vertebra during the nephrostogram and contrast extending proximally to almost the level of the L5 vertebra from below. A wire was placed through the 5fr open ended catheter but could not be placed into the more proximal portion of the ureter. The scope was removed. A correa catheter was placed and a cystogram was performed. This showed a 500cc bladder capacity with normal bladder contour. The bladder was drained, the correa was removed, and the patient was removed from lithotomy.     The patient tolerated the procedure well and was transferred to recovery in stable condition.    Disposition: The patient will follow up with Dr. Boyd in 2 weeks to discuss surgical options.     Hima Duncan MD

## 2019-12-11 NOTE — DISCHARGE SUMMARY
[facility]  Discharge Note  Short Stay    Admit Date: 12/11/2019    Discharge Date and Time: 12/11/2019 9:28 AM      Attending Physician: Robin Boyd MD     Discharge Provider: Hima Duncan    Diagnoses:  Active Hospital Problems    Diagnosis  POA    *Intraoperative ureteral injury [N99.81]  Yes      Resolved Hospital Problems   No resolved problems to display.       Discharged Condition: good    Hospital Course: Patient was admitted for RGP, antegrade nephrostogram, cystogram and tolerated the procedure well with no complications. The patient was discharged home in good condition on the same day.       Final Diagnoses: Same as principal problem.    Disposition: Home or Self Care    Follow up/Patient Instructions:    Medications:  Reconciled Home Medications:   Current Discharge Medication List      CONTINUE these medications which have NOT CHANGED    Details   amLODIPine (NORVASC) 10 MG tablet 10 mg by Tube route.      aspirin 81 mg Tab Take 81 mg by mouth. 1 Tablet Oral Every day      atorvastatin (LIPITOR) 80 MG tablet 1 tablet (80 mg total) by Per G Tube route once daily.  Qty: 90 tablet, Refills: 3      cefadroxil (DURICEF) 1 gram tablet TAKE 1 TABLET (1 G TOTAL) BY MOUTH 2 (TWO) TIMES DAILY.  Refills: 3      clopidogrel (PLAVIX) 75 mg tablet Take 1 tablet (75 mg total) by mouth once daily.  Qty: 90 tablet, Refills: 3      escitalopram oxalate (LEXAPRO) 10 MG tablet Take 1 tablet (10 mg total) by mouth once daily.  Qty: 30 tablet, Refills: 11      famotidine (PEPCID) 20 MG tablet Take 1 tablet (20 mg total) by mouth 2 (two) times daily.  Qty: 60 tablet, Refills: 11      furosemide (LASIX) 40 MG tablet Take 1 tablet (40 mg total) by mouth once daily.  Qty: 30 tablet, Refills: 11      insulin aspart U-100 (NOVOLOG) 100 unit/mL (3 mL) InPn pen Inject 1-10 Units into the skin 4 (four) times daily before meals and nightly.  Qty: 12 mL, Refills: 11      losartan (COZAAR) 50 MG tablet Take 1 tablet (50  mg total) by mouth once daily.  Qty: 90 tablet, Refills: 3      metoprolol tartrate (LOPRESSOR) 25 MG tablet Take 1 tablet (25 mg total) by mouth 2 (two) times daily.  Qty: 60 tablet, Refills: 11      multivitamin (THERAGRAN) per tablet Take 1 tablet by mouth once daily.      potassium chloride SA (K-DUR,KLOR-CON) 20 MEQ tablet Take 1 tablet (20 mEq total) by mouth 2 (two) times daily.  Qty: 60 tablet, Refills: 11    Associated Diagnoses: Hypokalemia      zolpidem (AMBIEN) 5 MG Tab Take 1 tablet (5 mg total) by mouth nightly as needed.  Qty: 30 tablet, Refills: 2      ACCU-CHEK EDIN PLUS METER Misc       albuterol (PROVENTIL/VENTOLIN HFA) 90 mcg/actuation inhaler Inhale 2 puffs into the lungs every 6 (six) hours as needed for Wheezing.  Qty: 1 each, Refills: 11      albuterol-ipratropium (DUO-NEB) 2.5 mg-0.5 mg/3 mL nebulizer solution Inhale 3 mLs into the lungs.      blood sugar diagnostic (ACCU-CHEK EDIN PLUS TEST STRP) Strp TEST BLOOD SUGARS 4 TIMES DAILY  Qty: 150 strip, Refills: 11    Associated Diagnoses: Type II or unspecified type diabetes mellitus with peripheral circulatory disorders, uncontrolled(250.72)      gabapentin (NEURONTIN) 300 MG capsule Take 1 capsule (300 mg total) by mouth 2 (two) times daily.  Qty: 60 capsule, Refills: 11      HYDROcodone-acetaminophen (NORCO) 5-325 mg per tablet Take 1 tablet by mouth every 6 (six) hours as needed for Pain.  Qty: 28 tablet, Refills: 0      nitroGLYCERIN (NITROSTAT) 0.4 MG SL tablet Take one every 2-3 min till chestpain relief for 3 times and if still no relief, call MD or come to ED  Qty: 30 tablet, Refills: 11      ondansetron (ZOFRAN-ODT) 8 MG TbDL Take 1 tablet (8 mg total) by mouth every 12 (twelve) hours as needed.  Qty: 30 tablet, Refills: 2      rifAMpin (RIFADIN) 300 MG capsule            Discharge Procedure Orders   Call MD for:  persistent nausea and vomiting or diarrhea     Call MD for:  severe uncontrolled pain     Call MD for:  persistent  dizziness, light-headedness, or visual disturbances     Call MD for:   Order Comments: Temperature > 101F     Follow-up Information     Robin Boyd MD In 2 weeks.    Specialty:  Urology  Contact information:  [address]  [facility] [address] [phone]                   Discharge Procedure Orders (must include Diet, Follow-up, Activity):   Discharge Procedure Orders (must include Diet, Follow-up, Activity)   Call MD for:  persistent nausea and vomiting or diarrhea     Call MD for:  severe uncontrolled pain     Call MD for:  persistent dizziness, light-headedness, or visual disturbances     Call MD for:   Order Comments: Temperature > 101F

## 2019-12-11 NOTE — PROGRESS NOTES
Left ureteral injury, subsequent encounter  -     CBC auto differential; Future; Expected date: 12/11/2019  -     Comprehensive metabolic panel; Future; Expected date: 12/11/2019    Transection of ureter of left native kidney, sequela    Nephrostomy status    Diabetic peripheral neuropathy associated with type 2 diabetes mellitus  -     Hemoglobin A1c; Future; Expected date: 12/11/2019    antegrade left nephrostogram revealed a transected left proximal ureter ending at L5 level.  Her bladder can hold 500 ml without a reflux.   Cysto left RPG showed strictured left ureter at the mid-ureter portion.    Based on these findings, will plan Exploratory lap with lysis of adhesion, and Psoas hitch with left ureteral re-implant ( possible Boari flap), left ureteral stent placement and left neph tube removal.  She needs a medial clearance especially her hemoglobin A1c has risen to over 9 from 7.  Better DM control is recommended.  Stop Plavix 5 days and ASA 1 wk for her surgery ( likely at the end of January).  Will plan H and P clinic with cath urine culture 1 wk before her surgery.

## 2019-12-11 NOTE — INTERVAL H&P NOTE
The patient has been examined and the H&P has been reviewed:    I concur with the findings and no changes have occurred since H&P was written.    Anesthesia/Surgery risks, benefits and alternative options discussed and understood by patient/family.          Active Hospital Problems    Diagnosis  POA    Intraoperative ureteral injury [N99.81]  Yes      Resolved Hospital Problems   No resolved problems to display.

## 2019-12-11 NOTE — PLAN OF CARE
Discharge instructions reviewed with patient and spouse. Verbalizes understanding. Copies of instructions given to patient. Tolerating po fluids. Denies pain or nausea. Safety maintained.

## 2019-12-11 NOTE — ANESTHESIA POSTPROCEDURE EVALUATION
Anesthesia Post Evaluation    Patient: Mariann Huff    Procedure(s) Performed: Procedure(s) (LRB):  CYSTOGRAM INTRAOP (N/A)  Nephrostogram - antegrade (Left)  CYSTOSCOPY, WITH RETROGRADE PYELOGRAM (Left)    Final Anesthesia Type: general    Patient location during evaluation: PACU  Patient participation: Yes- Able to Participate  Level of consciousness: awake and alert and oriented  Post-procedure vital signs: reviewed and stable  Pain management: adequate  Airway patency: patent    PONV status at discharge: No PONV  Anesthetic complications: no      Cardiovascular status: blood pressure returned to baseline, hemodynamically stable and stable  Respiratory status: unassisted, room air and spontaneous ventilation  Hydration status: euvolemic  Follow-up not needed.          Vitals Value Taken Time   /75 12/11/2019 10:16 AM   Temp 36.2 °C (97.2 °F) 12/11/2019 10:03 AM   Pulse 70 12/11/2019 10:27 AM   Resp 16 12/11/2019 10:15 AM   SpO2 100 % 12/11/2019 10:27 AM   Vitals shown include unvalidated device data.      Event Time     Out of Recovery 09:48:00          Pain/Derick Score: Derick Score: 10 (12/11/2019 10:03 AM)

## 2019-12-11 NOTE — TRANSFER OF CARE
"Anesthesia Transfer of Care Note    Patient: Mariann Huff    Procedure(s) Performed: Procedure(s) (LRB):  CYSTOGRAM INTRAOP (N/A)  Nephrostogram - antegrade (Left)  CYSTOSCOPY, WITH RETROGRADE PYELOGRAM (Left)    Patient location: PACU    Anesthesia Type: general    Transport from OR: Transported from OR on 6-10 L/min O2 by face mask with adequate spontaneous ventilation    Post pain: adequate analgesia    Post assessment: no apparent anesthetic complications and tolerated procedure well    Post vital signs: stable    Level of consciousness: awake, alert and oriented    Nausea/Vomiting: no nausea/vomiting    Complications: none          Last vitals:   Visit Vitals  BP (!) 153/70   Pulse 72   Temp 36.2 °C (97.2 °F) (Temporal)   Resp 16   Ht 5' 4" (1.626 m)   Wt 65.3 kg (144 lb)   LMP  (LMP Unknown)   SpO2 100%   Breastfeeding? No   BMI 24.72 kg/m²     "

## 2019-12-12 ENCOUNTER — TELEPHONE (OUTPATIENT)
Dept: UROLOGY | Facility: CLINIC | Age: 59
End: 2019-12-12

## 2019-12-12 NOTE — TELEPHONE ENCOUNTER
----- Message from Robin Boyd MD sent at 12/12/2019  4:23 PM CST -----  Contact: self  She does not need a 2 wks follow up.  Please have her come back and see me in January to discuss her upcoming surgery on 1/29/20?  ----- Message -----  From: Amber Mayer LPN  Sent: 12/12/2019   4:18 PM CST  To: Robin Boyd MD     You will be out of town, do you want her to see INDERJIT to check urine? Or see you in January ?  ----- Message -----  From: Dayanna Toledo  Sent: 12/12/2019   2:06 PM CST  To: [facility] Staff    Pt is trying to make appt for 2 week follow up    Please advise, can be reached at [phone]

## 2019-12-16 ENCOUNTER — CLINICAL SUPPORT (OUTPATIENT)
Dept: REHABILITATION | Facility: HOSPITAL | Age: 59
End: 2019-12-16
Attending: NEUROLOGICAL SURGERY
Payer: COMMERCIAL

## 2019-12-16 DIAGNOSIS — R26.89 POOR BALANCE: ICD-10-CM

## 2019-12-16 DIAGNOSIS — R29.898 WEAKNESS OF BOTH LOWER EXTREMITIES: ICD-10-CM

## 2019-12-16 PROCEDURE — 97110 THERAPEUTIC EXERCISES: CPT | Mod: PO

## 2019-12-16 NOTE — PROGRESS NOTES
"  Physical Therapy Daily Treatment Note     Name: Mariann Huff  Clinic Number: [id number]    Therapy Diagnosis:   Encounter Diagnoses   Name Primary?    Weakness of both lower extremities     Poor balance      Physician: Mk George MD    Visit Date: 12/16/2019     Physician Orders: PT Eval and Treat   Medical Diagnosis from Referral:   Z98.1 (ICD-10-CM) - S/P lumbar fusion   M53.3 (ICD-10-CM) - Sacroiliac joint dysfunction of both sides   Evaluation Date: 11/11/2019  Authorization Period Expiration: 10/17/2020  Plan of Care Expiration: 01/11/2019  Visit # / Visits authorized: 4/20     Time In: 1:00 pm  Time Out: 1:40 pm  Total Billable Time: 25 minutes    Precautions: Standard    Subjective     Pt reports: she is having dull ache across low back on this date.   She was not compliant with home exercise program.  Response to previous treatment: less overall pain   Functional change: no longer needs an AD for ambulation     Pain: 1/10  Location: bilateral back      Objective     Mariann received therapeutic exercises to develop strength, endurance, ROM, flexibility, posture and core stabilization for 25 minutes including:       Date  12/16/19 12/9/19 12/3/19   VISIT 4/20 3/20 2/20   G CODE VISIT 3/10  01/03/20 2/10  1/3/20 1/10  01/03/19   POC EXP. DATE 01/11/20 1/11/20 01/11/19   VISIT AMOUNT  MEDICARE TOTAL 60.64  355.76 90.96  295.12 90.96  204.16   FACE-TO-FACE 01/03/20 1/3/20 01/03/19           TABLE:       HSS w/ strap 10 x 10" 10 x 10" 10 x 10"   Bridge  2 x 10 w/ ball 1 x 15 w/ball 1 x 15 w/ball   LTR 2 x 10 1 x 15 1 x 15    Hip abduction  2 x 10 1 x 15 SL 1 x 10 SL   Hip adduction  2 x 10 1 x 15 SL 1 x 10 SL   SAQ 2 x 10 x 1# 1 x 15 x 1# 1 x 15 x 1#   SLR 2 x 10 x 1# 1 x 15 x 1# 1 x 15 x 1#            SEATED:       LAQs 2 x 10 x 1# 1 x 15 x 1# 1 x 15 x 1#   Seated Hip Flex 2 x 10 x 1# 1 x 15 x 1# 1 x 15 x 1#    Standing HS Curl  2 x 10 YTB 1 x 15 YTB 1 x 15 x 1#            STANDING:       Mini " "squats 1 x 10 on Foam 1 x 10 on Foam 1 x 10 On foam    Hip ABD 1 x 15 YTB 1 x 10 YTB 1 x 10 YTB   Hip Ext  1 x 15 YTB 1 x 10 YTB 1 x 10 YTB   Side Stepping in squat  --- --  Next    Tandem Stance  2 x 30" 2 x 30" 2 x 30"   Lateral Step Ups L1  1 x 15 L1 1 x 10 L2 - NT   Standing Lat Pull Downs 2 x 10 RTB 1 x 15 RTB 1 x 15 RTB           Initials GWA 1/6 MA BJ         Home Exercises and Patient Education Provided     Education provided:   - importance of performing HEP with or without therapy      Written Home Exercises Provided: yes.  Exercises were reviewed and Mariann was able to demonstrate them prior to the end of the session. Mariann demonstrated good  understanding of the education provided.      See EMR under Patient Instructions for exercises provided 11/11/2019.    Assessment     Mariann continues to have difficulty performing tandem stance exercise as well as weakness in hip musculature limiting tolerance to progression on this date.  Patient performed above exercise program with verbal cueing for proper technique and had no difficulty with today's progressions.     Mariann is progressing well towards her goals.   Pt prognosis is Good.     Pt will continue to benefit from skilled outpatient physical therapy to address the deficits listed in the problem list box on initial evaluation, provide pt/family education and to maximize pt's level of independence in the home and community environment.     Pt's spiritual, cultural and educational needs considered and pt agreeable to plan of care and goals.     Anticipated barriers to physical therapy: none    Goals:   Short Term Goals: 4 weeks   Patient will be able to stand for 15-30 minutes consecutively with min-no pain/difficulty in order to prepare full meal. IN PROGRESS  Patient will be able to ambulate for 15-30 minutes consecutively with min-no pain/difficulty in order grocery shop.  IN PROGRESS  Patient will be able to sit for 30 minutes - 1 hour " consecutively with min-no pain/difficulty in order to watch one TV Shop with family. IN PROGRESS     Long Term Goals: 8 weeks   Patient will be able to stand for 30 minutes-1 hour consecutively with min-no pain/difficulty in order to prepare full meal. IN PROGRESS  Patient will be able to ambulate 1 hour consecutively with min-no pain/difficulty in order grocery shop.  IN PROGRESS  Patient will be able to sit for 1-2 hours consecutively with min-no pain/difficulty in order to watch one TV Shop with family.  IN PROGRESS    Plan     Continue with PT POC.  Increase reps with table exercises next visit.    Gualberto Murdock, PTA,

## 2019-12-17 DIAGNOSIS — I10 HTN (HYPERTENSION), BENIGN: Chronic | ICD-10-CM

## 2019-12-17 RX ORDER — AMLODIPINE BESYLATE 10 MG/1
TABLET ORAL
Qty: 90 TABLET | Refills: 2 | Status: SHIPPED | OUTPATIENT
Start: 2019-12-17 | End: 2020-07-04

## 2019-12-18 ENCOUNTER — CLINICAL SUPPORT (OUTPATIENT)
Dept: REHABILITATION | Facility: HOSPITAL | Age: 59
End: 2019-12-18
Attending: NEUROLOGICAL SURGERY
Payer: COMMERCIAL

## 2019-12-18 DIAGNOSIS — R29.898 WEAKNESS OF BOTH LOWER EXTREMITIES: ICD-10-CM

## 2019-12-18 DIAGNOSIS — R26.89 POOR BALANCE: ICD-10-CM

## 2019-12-18 PROCEDURE — 97110 THERAPEUTIC EXERCISES: CPT | Mod: PO,GP

## 2019-12-18 NOTE — PROGRESS NOTES
"  Physical Therapy Daily Treatment Note     Name: Mariann Huff  Clinic Number: [id number]    Therapy Diagnosis:   Encounter Diagnoses   Name Primary?    Weakness of both lower extremities     Poor balance      Physician: Mk George MD    Visit Date: 12/18/2019     Physician Orders: PT Eval and Treat   Medical Diagnosis from Referral:   Z98.1 (ICD-10-CM) - S/P lumbar fusion   M53.3 (ICD-10-CM) - Sacroiliac joint dysfunction of both sides   Evaluation Date: 11/11/2019  Authorization Period Expiration: 10/17/2020  Plan of Care Expiration: 01/11/2019  Visit # / Visits authorized: 5/20     Time In: 1:00 pm  Time Out: 1:45 pm  Total Billable Time: 25 minutes    Precautions: Standard    Subjective     Pt reports: she is hurting today, but thinks the change in weather may be the reason why    She was not compliant with home exercise program.  Response to previous treatment: less overall pain   Functional change: no longer needs an AD for ambulation     Pain: 4/10  Location: bilateral back      Objective     Mariann received therapeutic exercises to develop strength, endurance, ROM, flexibility, posture and core stabilization for 25 minutes including:       Date  12/18/19 12/16/19 12/9/19 12/3/19   VISIT 5/20 4/20 3/20 2/20   G CODE VISIT 4/10  01/03/20 3/10  01/03/20 2/10  1/3/20 1/10  01/03/19   POC EXP. DATE 01/11/20 01/11/20 1/11/20 01/11/19   VISIT AMOUNT  MEDICARE TOTAL 60.64  416.40 60.64  355.76 90.96  295.12 90.96  204.16   FACE-TO-FACE 01/03/20 01/03/20 1/3/20 01/03/19            TABLE:        HSS w/ strap 10 x 10" 10 x 10" 10 x 10" 10 x 10"   Bridge  2 x 10 w/ball  2 x 10 w/ ball 1 x 15 w/ball 1 x 15 w/ball   LTR 2 x 10  2 x 10 1 x 15 1 x 15    Hip abduction  2 x 10  2 x 10 1 x 15 SL 1 x 10 SL   Hip adduction  2 x 10  2 x 10 1 x 15 SL 1 x 10 SL   SAQ 2 x 10 x 1# 2 x 10 x 1# 1 x 15 x 1# 1 x 15 x 1#   SLR 2 x 10 x 1# 2 x 10 x 1# 1 x 15 x 1# 1 x 15 x 1#             SEATED:        LAQs 2 x 10 x 1# 2 x 10 x " "1# 1 x 15 x 1# 1 x 15 x 1#   Seated Hip Flex 2 x 10 x 1# 2 x 10 x 1# 1 x 15 x 1# 1 x 15 x 1#    Standing HS Curl  3 x 10 YTB 2 x 10 YTB 1 x 15 YTB 1 x 15 x 1#             STANDING:        Mini squats 1 x 15 on Foam  1 x 10 on Foam 1 x 10 on Foam 1 x 10 On foam    Hip ABD 2 x 10 YTB 1 x 15 YTB 1 x 10 YTB 1 x 10 YTB   Hip Ext  2 x 10  YTB 1 x 15 YTB 1 x 10 YTB 1 x 10 YTB   Side Stepping in squat   --- --  Next    Tandem Stance  2 x 30" 2 x 30" 2 x 30" 2 x 30"   Lateral Step Ups L1 1 x 15  L1  1 x 15 L1 1 x 10 L2 - NT   Standing Lat Pull Downs 2 x 10 RTB 2 x 10 RTB 1 x 15 RTB 1 x 15 RTB            Initials BJ GWA 1/6 MA BJ       Functional limitation reporting disability percentage was obtained through use of FOTO Lower Back Survey indicating 53% disability     Home Exercises and Patient Education Provided     Education provided:   - practicing balance at home      Written Home Exercises Provided: yes.  Exercises were reviewed and Mariann was able to demonstrate them prior to the end of the session. Mariann demonstrated good  understanding of the education provided.      See EMR under Patient Instructions for exercises provided 11/11/2019.    Assessment     Mariann continues to have difficulty performing balance activity. Patient performed above exercise program with verbal cueing for proper technique and had no difficulty with today's progressions.     Mariann is progressing well towards her goals.   Pt prognosis is Good.     Pt will continue to benefit from skilled outpatient physical therapy to address the deficits listed in the problem list box on initial evaluation, provide pt/family education and to maximize pt's level of independence in the home and community environment.     Pt's spiritual, cultural and educational needs considered and pt agreeable to plan of care and goals.     Anticipated barriers to physical therapy: none    Goals:   Short Term Goals: 4 weeks   Patient will be able to stand for 15-30 " minutes consecutively with min-no pain/difficulty in order to prepare full meal. IN PROGRESS  Patient will be able to ambulate for 15-30 minutes consecutively with min-no pain/difficulty in order grocery shop.  IN PROGRESS  Patient will be able to sit for 30 minutes - 1 hour consecutively with min-no pain/difficulty in order to watch one TV Shop with family. IN PROGRESS     Long Term Goals: 8 weeks   Patient will be able to stand for 30 minutes-1 hour consecutively with min-no pain/difficulty in order to prepare full meal. IN PROGRESS  Patient will be able to ambulate 1 hour consecutively with min-no pain/difficulty in order grocery shop.  IN PROGRESS  Patient will be able to sit for 1-2 hours consecutively with min-no pain/difficulty in order to watch one TV Shop with family.  IN PROGRESS    Plan     Continue with PT POC.  Progress to 1.5# pounds for SLR and SAQ. Progress to RTB for HS curls. Increase repetitions of all exercises excluding standing hip abduction and extension.     Mallory Dunne, PT, DPT

## 2019-12-23 ENCOUNTER — CLINICAL SUPPORT (OUTPATIENT)
Dept: REHABILITATION | Facility: HOSPITAL | Age: 59
End: 2019-12-23
Attending: NEUROLOGICAL SURGERY
Payer: COMMERCIAL

## 2019-12-23 DIAGNOSIS — R29.898 WEAKNESS OF BOTH LOWER EXTREMITIES: ICD-10-CM

## 2019-12-23 DIAGNOSIS — R26.89 POOR BALANCE: ICD-10-CM

## 2019-12-23 PROCEDURE — 97110 THERAPEUTIC EXERCISES: CPT | Mod: PO,GP

## 2019-12-23 NOTE — PROGRESS NOTES
"  Physical Therapy Daily Treatment Note     Name: Mariann Huff  Clinic Number: [id number]    Therapy Diagnosis:   Encounter Diagnoses   Name Primary?    Weakness of both lower extremities     Poor balance      Physician: Mk George MD    Visit Date: 12/23/2019     Physician Orders: PT Eval and Treat   Medical Diagnosis from Referral:   Z98.1 (ICD-10-CM) - S/P lumbar fusion   M53.3 (ICD-10-CM) - Sacroiliac joint dysfunction of both sides   Evaluation Date: 11/11/2019  Authorization Period Expiration: 10/17/2020  Plan of Care Expiration: 01/11/2019  Visit # / Visits authorized: 5/20     Time In: 1:00 pm  Time Out: 1:42 pm  Total Billable Time: 25 minutes    Precautions: Standard    Subjective     Pt reports: she was hurting more over the weekend   She was not compliant with home exercise program.  Response to previous treatment: less overall pain   Functional change: no longer needs an AD for ambulation     Pain: 3/10  Location: bilateral back      Objective     Mariann received therapeutic exercises to develop strength, endurance, ROM, flexibility, posture and core stabilization for 25 minutes including:       Date  12/23/19 12/18/19 12/16/19 12/9/19 12/3/19   VISIT 5/20 5/20 4/20 3/20 2/20   G CODE VISIT 5/10  01/03/20 4/10  01/03/20 3/10  01/03/20 2/10  1/3/20 1/10  01/03/19   POC EXP. DATE 01/11/20 01/11/20 01/11/20 1/11/20 01/11/19   VISIT AMOUNT  MEDICARE TOTAL 60.64  477.04 60.64  416.40 60.64  355.76 90.96  295.12 90.96  204.16   FACE-TO-FACE 01/03/20 01/03/20 01/03/20 1/3/20 01/03/19             TABLE:         HSS w/ strap 10 x 10" 10 x 10" 10 x 10" 10 x 10" 10 x 10"   Bridge  2 x 10 w/ball  2 x 10 w/ball  2 x 10 w/ ball 1 x 15 w/ball 1 x 15 w/ball   LTR 2 x 10  2 x 10  2 x 10 1 x 15 1 x 15    Hip abduction  2 x 10  2 x 10  2 x 10 1 x 15 SL 1 x 10 SL   Hip adduction  2 x 10 2 x 10  2 x 10 1 x 15 SL 1 x 10 SL   SAQ 3 x 10 x 1.5# 2 x 10 x 1# 2 x 10 x 1# 1 x 15 x 1# 1 x 15 x 1#   SLR 2 x 10 1.5# 2 x " "10 x 1# 2 x 10 x 1# 1 x 15 x 1# 1 x 15 x 1#              SEATED:         LAQs 3 x 10 x 1.5# 2 x 10 x 1# 2 x 10 x 1# 1 x 15 x 1# 1 x 15 x 1#   Seated Hip Flex 3 x 10 x 1.5# 2 x 10 x 1# 2 x 10 x 1# 1 x 15 x 1# 1 x 15 x 1#    Standing HS Curl  2 x 10 RTB 3 x 10 YTB 2 x 10 YTB 1 x 15 YTB 1 x 15 x 1#              STANDING:         Mini squats 1 x 15 on Foam  1 x 15 on Foam  1 x 10 on Foam 1 x 10 on Foam 1 x 10 On foam    Hip ABD 2 x 10 YTB 2 x 10 YTB 1 x 15 YTB 1 x 10 YTB 1 x 10 YTB   Hip Ext  2 x 10 YTB 2 x 10  YTB 1 x 15 YTB 1 x 10 YTB 1 x 10 YTB   Side Stepping in squat  2 x 11 ft   --- --  Next    Tandem Stance  2 x 30" 2 x 30" 2 x 30" 2 x 30" 2 x 30"   Lateral Step Ups L1 2 x 10  L1 1 x 15  L1  1 x 15 L1 1 x 10 L2 - NT   Standing Lat Pull Downs 2 x 10 GTB 2 x 10 RTB 2 x 10 RTB 1 x 15 RTB 1 x 15 RTB             Initials BJ BJ GWA 1/6 MA BJ       Functional limitation reporting disability percentage was obtained through use of FOTO Lower Back Survey indicating 53% disability     Home Exercises and Patient Education Provided     Education provided:   - practicing balance at home      Written Home Exercises Provided: yes.  Exercises were reviewed and Mariann was able to demonstrate them prior to the end of the session. Mariann demonstrated good  understanding of the education provided.      See EMR under Patient Instructions for exercises provided 11/11/2019.    Assessment     Mariann demonstrated improved balance today requiring less UE support and no LOB. Patient required increased verbal and tactile cueing to perform side stepping in squat.     Mariann is progressing well towards her goals.   Pt prognosis is Good.     Pt will continue to benefit from skilled outpatient physical therapy to address the deficits listed in the problem list box on initial evaluation, provide pt/family education and to maximize pt's level of independence in the home and community environment.     Pt's spiritual, cultural and " educational needs considered and pt agreeable to plan of care and goals.     Anticipated barriers to physical therapy: none    Goals:   Short Term Goals: 4 weeks   Patient will be able to stand for 15-30 minutes consecutively with min-no pain/difficulty in order to prepare full meal. IN PROGRESS  Patient will be able to ambulate for 15-30 minutes consecutively with min-no pain/difficulty in order grocery shop.  IN PROGRESS  Patient will be able to sit for 30 minutes - 1 hour consecutively with min-no pain/difficulty in order to watch one TV Shop with family. IN PROGRESS     Long Term Goals: 8 weeks   Patient will be able to stand for 30 minutes-1 hour consecutively with min-no pain/difficulty in order to prepare full meal. IN PROGRESS  Patient will be able to ambulate 1 hour consecutively with min-no pain/difficulty in order grocery shop.  IN PROGRESS  Patient will be able to sit for 1-2 hours consecutively with min-no pain/difficulty in order to watch one TV Shop with family.  IN PROGRESS    Plan     Continue with PT POC.  Progress to 1# ankle weights to SL exercises next visit.     Mallory Dunne, PT, DPT

## 2019-12-30 RX ORDER — INSULIN ASPART 100 [IU]/ML
INJECTION, SOLUTION INTRAVENOUS; SUBCUTANEOUS
Qty: 30 SYRINGE | Refills: 0 | Status: SHIPPED | OUTPATIENT
Start: 2019-12-30 | End: 2020-01-06

## 2020-01-01 NOTE — ANESTHESIA PREPROCEDURE EVALUATION
"                                                                                                             04/12/2019  Mariann Huff is a 59 y.o., female.    : DDD (degenerative disc disease), lumbar [M51.36]    Pre-operative evaluation for Procedure(s) (LRB):  FUSION, SPINE, LUMBAR, ANTERIOR APPROACH Left L5-S1 Anterior to Psoa Interbody Fusion, L5-S1 Posterior Instrumentation (Left)    Encounter Diagnosis   Name Primary?    Lumbosacral radiculopathy at L5        Review of patient's allergies indicates:  No Known Allergies    Current Facility-Administered Medications on File Prior to Encounter   Medication Dose Route Frequency Provider Last Rate Last Dose    0.9%  NaCl infusion  500 mL Intravenous Continuous Dave Bentley Jr., MD        lidocaine (PF) 10 mg/ml (1%) injection 10 mg  1 mL Intradermal Once Dave Bentley Jr., MD         Current Outpatient Medications on File Prior to Encounter   Medication Sig Dispense Refill    ACCU-CHEK EDIN PLUS METER Misc       albuterol (PROVENTIL/VENTOLIN HFA) 90 mcg/actuation inhaler Inhale 2 puffs into the lungs every 6 (six) hours as needed for Wheezing. 1 each 11    amLODIPine (NORVASC) 10 MG tablet TAKE 1 TABLET (10 MG TOTAL) BY MOUTH ONCE DAILY. 30 tablet 6    aspirin 81 mg Tab Take 81 mg by mouth. 1 Tablet Oral Every day      atorvastatin (LIPITOR) 40 MG tablet TAKE 1 TABLET (40 MG TOTAL) BY MOUTH ONCE DAILY. 30 tablet 11    BD INSULIN PEN NEEDLE UF MINI 31 x 3/16 " Ndle USE 4 TIMES A  each 11    blood sugar diagnostic (ACCU-CHEK EDIN PLUS TEST STRP) Strp TEST BLOOD SUGARS 4 TIMES DAILY 150 strip 11    chlorthalidone (HYGROTEN) 50 MG Tab Take 1 tablet (50 mg total) by mouth once daily. 90 tablet 3    cyclobenzaprine (FLEXERIL) 10 MG tablet TAKE 1 TABLET BY MOUTH 3 TIMES A DAY AS NEEDED FOR MUSCLE SPASMS. NO WORKING OR DRIVING ON THIS MED 45 tablet 5    esomeprazole (NEXIUM) 40 MG capsule TAKE 1 CAPSULE (40 MG TOTAL) BY MOUTH BEFORE " "BREAKFAST. 90 capsule 3    fenofibrate micronized (LOFIBRA) 134 MG Cap TAKE 1 CAPSULE (134 MG TOTAL) BY MOUTH BEFORE BREAKFAST. 90 capsule 3    flash glucose scanning reader (FREESTYLE MISTI 14 DAY READER) Misc 1 each by Misc.(Non-Drug; Combo Route) route once daily. 2 each 11    flash glucose sensor (FREESTYLE MISTI 14 DAY SENSOR) Kit 1 each by Misc.(Non-Drug; Combo Route) route once daily. 2 kit 11    gabapentin (NEURONTIN) 100 MG capsule Take 2 capsules (200 mg total) by mouth every evening. 60 capsule 11    insulin aspart U-100 (NOVOLOG FLEXPEN U-100 INSULIN) 100 unit/mL InPn pen INJECT 35 U AM, 45 U AT NOON, 35 U PM.150-200 +2, 201-250 +4, 251-300 +6, 301-350 +8, >350 +10 30 Syringe 1    insulin glargine, TOUJEO, (TOUJEO SOLOSTAR U-300 INSULIN) 300 unit/mL (1.5 mL) InPn pen Inject 50 Units into the skin 2 (two) times daily. 6 Syringe 6    INVOKANA 300 mg Tab tablet Take 1 tablet (300 mg total) by mouth once daily. 30 tablet 6    irbesartan (AVAPRO) 300 MG tablet Take 1 tablet (300 mg total) by mouth every evening. 90 tablet 3    lancets 30 gauge Misc       metoprolol succinate (TOPROL-XL) 100 MG 24 hr tablet TAKE 1 TABLET (100 MG TOTAL) BY MOUTH ONCE DAILY. 30 tablet 11    nitroGLYCERIN (NITROSTAT) 0.4 MG SL tablet Take one every 2-3 min till chestpain relief for 3 times and if still no relief, call MD or come to ED 30 tablet 11    omega-3 acid ethyl esters (LOVAZA) 1 gram capsule Take 1 g by mouth once daily.       pen needle, diabetic (BD ULTRA-FINE GRABIEL PEN NEEDLES) 32 gauge x 5/32" Ndle For use with insulin pens daily 4 times a day 100 each 11    prasugrel (EFFIENT) 10 mg Tab TAKE 1 TABLET (10 MG TOTAL) BY MOUTH ONCE DAILY. 30 tablet 10    tramadol (ULTRAM) 50 mg tablet Take 1 tablet (50 mg total) by mouth 3 (three) times daily as needed for Pain. 60 tablet 1    TRULICITY 1.5 mg/0.5 mL PnIj INJECT 1.5 MG INTO THE SKIN EVERY 7 DAYS. 2 mL 6    zolpidem (AMBIEN) 5 MG Tab Take 1 tablet (5 mg " total) by mouth nightly as needed. 30 tablet 2       Social History     Tobacco Use   Smoking Status Never Smoker   Smokeless Tobacco Never Used       Social History     Substance and Sexual Activity   Alcohol Use No    Alcohol/week: 0.0 oz       Patient Active Problem List   Diagnosis    Hypertension associated with diabetes    Hyperlipidemia    CAD (coronary artery disease)    Sleep apnea    Carotid stenosis, bilateral    Non compliance with medical treatment    Coronary stent restenosis    S/P coronary artery stent placement    Nuclear sclerosis - Right Eye    Primary localized osteoarthrosis, lower leg    Chronic sinusitis    PSC (posterior subcapsular cataract), right    DME (diabetic macular edema)    Refractive error    Pseudophakia    Senile nuclear sclerosis    Type 2 diabetes mellitus with diabetic peripheral angiopathy without gangrene    Diabetic peripheral neuropathy associated with type 2 diabetes mellitus    Cervical arthritis    Neurogenic claudication    Lumbar herniated disc    Fatty liver disease, nonalcoholic    Arthritis of lumbar spine    Chronic pain    Lumbar radiculopathy       Past Surgical History:   Procedure Laterality Date    CARDIAC CATHETERIZATION      cataract extraction left eye      cataracts      CATHETERIZATION, HEART, LEFT Left 2014    Performed by Wilman Kim MD at [facility] CATH LAB     SECTION, LOW TRANSVERSE      CORONARY ANGIOPLASTY      ESOPHAGOGASTRODUODENOSCOPY (EGD) N/A 2016    Performed by Gardenia Adamson MD at [facility] ENDO (4TH FLR)    EXCISION TURBINATE, SUBMUCOUS      HAND SURGERY Left     HAND SURGERY Right     torn ligament repair/ Dr. Yeboah    HYSTERECTOMY      Injection,steroid,epidural,transforaminal approach - Bilateral - S1 Bilateral 2018    Performed [facility] Tl [facility]breu [facility] at [facility] PAIN MGT    Injection-steroid-epidural-caudal N/A 2019    Performed by Dave Bentley Jr., [facility] at [facility]  PAIN MGT    INSERTION-INTRAOCULAR LENS (IOL) Right 9/1/2015    Performed by Good Domingo MD at [facility] OR 1ST FLR    left foot surgery      left wrist surgery      NASAL SEPTUM SURGERY  5/7/15    PHACOEMULSIFICATION-ASPIRATION-CATARACT Right 9/1/2015    Performed by Good [facility]ingo [facility] at [facility] OR 1ST FLR    RESECTION-TURBINATES (SMR) N/A 5/7/2015    Performed by Dileep Dubois III, MD at [facility] OR 2ND FLR    rt elbow surgery      S/P LAD COATED STENT  05/14/2010    6 total     S/P OM1 STENT  08/2003    SEPTOPLASTY N/A 5/7/2015    Performed by Dileep Dubois III, MD at [facility] OR 2ND FLR    SINUS SURGERY      F.E.S.S.    SINUS SURGERY FUNCTIONAL ENDOSCOPIC WITH NAVIGATION WITH MAXILLARIES, ETHMOIDS, LEFT SPHENOID, LEFT LOLY N/A 5/7/2015    Performed by Dileep [facility]ois III, MD at [facility] OR 2ND FLR    TUBAL LIGATION             No results for input(s): HCT in the last 72 hours.  No results for input(s): PLT in the last 72 hours.  No results for input(s): K in the last 72 hours.  No results for input(s): CREATININE in the last 72 hours.  No results for input(s): GLU in the last 72 hours.  No results for input(s): PT in the last 72 hours.                    Anesthesia Evaluation         Review of Systems  Anesthesia Hx:  No problems with previous Anesthesia    Hematology/Oncology:     Oncology Normal    -- Denies Anemia: Hematology Comments: pasugrel last took April 5th for cardiac stents.    Cardiovascular:   Hypertension, well controlled Past MI (had MI 2013, went to er and they placed stents a few days later.) CAD asymptomatic CABG/stent   Denies Angina.     Walking severely limited by LBP, not any cardiac symptoms.   Pulmonary:   Denies COPD.  Denies Asthma. (in remission except one bout of asthmatic bronchitis last year.)  Denies Shortness of breath. Sleep Apnea (does not tolerate mask)    Renal/:   Denies Chronic Renal Disease.     Neurological:   Denies TIA. Denies CVA. Denies Seizures.   Statement Selected   Endocrine:   Diabetes, type 2, using insulin Novalog, trujeo, trulicity       Physical Exam  General:  Well nourished    Airway/Jaw/Neck:  Airway Findings: Mouth Opening: Normal Tongue: Normal  General Airway Assessment: Adult, Average  Mallampati: II  TM Distance: Normal, at least 6 cm  Jaw/Neck Findings:  Neck ROM: Normal ROM            Mental Status:  Mental Status Findings:  Cooperative, Alert and Oriented         Anesthesia Plan  Type of Anesthesia, risks & benefits discussed:  Anesthesia Type:  general  Patient's Preference:   Intra-op Monitoring Plan:   Intra-op Monitoring Plan Comments:   Post Op Pain Control Plan:   Post Op Pain Control Plan Comments: As per surgeon's plan  Induction:   IV  Beta Blocker:         Informed Consent: Patient understands risks and agrees with Anesthesia plan.  Questions answered. Anesthesia consent signed with patient.  ASA Score: 2     Day of Surgery Review of History & Physical:    H&P update referred to the surgeon.         Ready For Surgery From Anesthesia Perspective.

## 2020-01-03 ENCOUNTER — TELEPHONE (OUTPATIENT)
Dept: UROLOGY | Facility: CLINIC | Age: 60
End: 2020-01-03

## 2020-01-03 ENCOUNTER — PATIENT OUTREACH (OUTPATIENT)
Dept: ADMINISTRATIVE | Facility: OTHER | Age: 60
End: 2020-01-03

## 2020-01-03 DIAGNOSIS — E08.69 DIABETES DUE TO UNDERLYING CONDITION W OTH COMPLICATION: Primary | ICD-10-CM

## 2020-01-03 DIAGNOSIS — N13.5 URETER, STRICTURE: ICD-10-CM

## 2020-01-03 DIAGNOSIS — Z93.6 NEPHROSTOMY STATUS: ICD-10-CM

## 2020-01-03 DIAGNOSIS — S37.10XD LEFT URETERAL INJURY, SUBSEQUENT ENCOUNTER: Primary | ICD-10-CM

## 2020-01-03 NOTE — TELEPHONE ENCOUNTER
Diabetes due to underlying condition w oth complication  -     Ambulatory referral/consult to Endocrinology; Future; Expected date: 01/03/2020    would you help her to be seen by endocrinologist ASAP?  She is scheduled to see me on 1/21/20 to schedule her left ureteral reimplant ( Psoas hitch or boari flap, possible ileal ureter).  Thank you

## 2020-01-03 NOTE — TELEPHONE ENCOUNTER
Left ureteral injury, subsequent encounter  -     Case Request Operating Room: LAPAROTOMY, EXPLORATORY, LYSIS, ADHESIONS, REIMPLANTATION, URETER WITH PSOAS HITCH, CYSTOSCOPY, WITH URETERAL STENT INSERTION, REPLACEMENT, NEPHROSTOMY TUBE removal removal    Nephrostomy status  -     Case Request Operating Room: LAPAROTOMY, EXPLORATORY, LYSIS, ADHESIONS, REIMPLANTATION, URETER WITH PSOAS HITCH, CYSTOSCOPY, WITH URETERAL STENT INSERTION, REPLACEMENT, NEPHROSTOMY TUBE removal removal    Ureter, stricture  -     Case Request Operating Room: LAPAROTOMY, EXPLORATORY, LYSIS, ADHESIONS, REIMPLANTATION, URETER WITH PSOAS HITCH, CYSTOSCOPY, WITH URETERAL STENT INSERTION, REPLACEMENT, NEPHROSTOMY TUBE removal removal

## 2020-01-03 NOTE — TELEPHONE ENCOUNTER
----- Message from Konstantin East sent at 1/3/2020  2:34 PM CST -----  Contact: pt: [phone]  Pt called to notify Dr. Boyd that she has a appt with Endocrinology on Monday     Please contact pt: [phone]

## 2020-01-06 ENCOUNTER — OFFICE VISIT (OUTPATIENT)
Dept: ENDOCRINOLOGY | Facility: CLINIC | Age: 60
End: 2020-01-06
Payer: COMMERCIAL

## 2020-01-06 VITALS
SYSTOLIC BLOOD PRESSURE: 150 MMHG | HEART RATE: 76 BPM | DIASTOLIC BLOOD PRESSURE: 64 MMHG | HEIGHT: 64 IN | RESPIRATION RATE: 18 BRPM | WEIGHT: 150.88 LBS | BODY MASS INDEX: 25.76 KG/M2

## 2020-01-06 DIAGNOSIS — N99.81 INTRAOPERATIVE URETERAL INJURY: ICD-10-CM

## 2020-01-06 DIAGNOSIS — E78.2 MIXED HYPERLIPIDEMIA: Chronic | ICD-10-CM

## 2020-01-06 DIAGNOSIS — I25.10 CORONARY ARTERY DISEASE INVOLVING NATIVE CORONARY ARTERY OF NATIVE HEART WITHOUT ANGINA PECTORIS: Chronic | ICD-10-CM

## 2020-01-06 DIAGNOSIS — I12.9 TYPE 2 DIABETES MELLITUS WITH STAGE 2 CHRONIC KIDNEY DISEASE AND HYPERTENSION: Primary | ICD-10-CM

## 2020-01-06 DIAGNOSIS — E08.69 DIABETES DUE TO UNDERLYING CONDITION W OTH COMPLICATION: ICD-10-CM

## 2020-01-06 DIAGNOSIS — N18.2 TYPE 2 DIABETES MELLITUS WITH STAGE 2 CHRONIC KIDNEY DISEASE AND HYPERTENSION: Primary | ICD-10-CM

## 2020-01-06 DIAGNOSIS — E11.22 TYPE 2 DIABETES MELLITUS WITH STAGE 2 CHRONIC KIDNEY DISEASE AND HYPERTENSION: Primary | ICD-10-CM

## 2020-01-06 DIAGNOSIS — E11.311 DIABETIC MACULAR EDEMA: ICD-10-CM

## 2020-01-06 PROCEDURE — 99999 PR PBB SHADOW E&M-EST. PATIENT-LVL V: ICD-10-PCS | Mod: PBBFAC,,, | Performed by: STUDENT IN AN ORGANIZED HEALTH CARE EDUCATION/TRAINING PROGRAM

## 2020-01-06 PROCEDURE — 99999 PR PBB SHADOW E&M-EST. PATIENT-LVL V: CPT | Mod: PBBFAC,,, | Performed by: STUDENT IN AN ORGANIZED HEALTH CARE EDUCATION/TRAINING PROGRAM

## 2020-01-06 PROCEDURE — 99214 OFFICE O/P EST MOD 30 MIN: CPT | Mod: S$GLB,,, | Performed by: INTERNAL MEDICINE

## 2020-01-06 PROCEDURE — 99214 PR OFFICE/OUTPT VISIT, EST, LEVL IV, 30-39 MIN: ICD-10-PCS | Mod: S$GLB,,, | Performed by: INTERNAL MEDICINE

## 2020-01-06 PROCEDURE — 99215 OFFICE O/P EST HI 40 MIN: CPT | Mod: PBBFAC | Performed by: STUDENT IN AN ORGANIZED HEALTH CARE EDUCATION/TRAINING PROGRAM

## 2020-01-06 RX ORDER — INSULIN ASPART 100 [IU]/ML
6 INJECTION, SOLUTION INTRAVENOUS; SUBCUTANEOUS
Qty: 6 ML | Refills: 5 | Status: SHIPPED | OUTPATIENT
Start: 2020-01-06 | End: 2020-09-17

## 2020-01-06 RX ORDER — INSULIN GLARGINE 300 [IU]/ML
18 INJECTION, SOLUTION SUBCUTANEOUS NIGHTLY
Qty: 6 ML | Refills: 5 | Status: SHIPPED | OUTPATIENT
Start: 2020-01-06 | End: 2020-09-17

## 2020-01-06 NOTE — PROGRESS NOTES
I have reviewed and concur with Dr. Dawson's history, physical, assessment, and plan.  I have personally interviewed and examined the patient.    T2DM with A1c above goal on regimen of only prandial insulin  Will restart basal and reduce prandial dose based on weight. Plan for professional CGM to further clarify trend and then likely start back GLP1  Hold on resuming SGLT2 until stable from urology perspective   Encouraged her to avoid further weight gain as BMI appropriate    Tatiana Randolph MD

## 2020-01-06 NOTE — PATIENT INSTRUCTIONS
Please start Toujeo 18 units daily    Please decrease Novolog 6 units before every meal    We will schedule you to come to get your continuous glucose monitor

## 2020-01-06 NOTE — PROGRESS NOTES
Subjective:      Patient ID: Mariann Huff is a 59 y.o. female.    Chief Complaint:  Diabetes      History of Present Illness  This is a 59 y.o. female with a past medical history of type 2 diabetes diagnosed in 2002, CAD, CKD with proteinuria. She underwent L5-S1 OLIF on 4/12 and had intraoperative left ureteral injury with ureteroureteral anastomosis and ureteral stent placement, since then has had complications including sepsis with respiratory failure requiring intubation and tube feedings. She lost a significant amount of weight during her critical care stay and was only requiring prandial insulin with tube feedings.       Current diabetic medications:  - Insulin Novolog 10 AC    Past diabetes medications:  - Insulin Toujeo and Novolog  - Metformin (did not tolerate)  - Trulicity  - Invokana    Last A1c: 9.2% Dec 2019    Known diabetic complications: nephropathy, retinopathy, peripheral neuropathy and cardiovascular disease  Other cardiovascular risk factors: female older than 55 years of age, hyperlipidemia, hypertension, obesity, a history of coronary artery disease    Eye exam current (within one year): yes, following actively  Last foot exam: 2018  Weight trend: Lost 20 lb , feel like has regained 15lb  Prior visit with diabetes education: yes    Current diet:    - Breakfast: Biscuit, sausage, 1 piece of fruit   - Lunch: Tyler or small snack   - Dinner: Baked chicken, broccoli, beans   - Snacks: Likes chips, candy, fruit cup  Current exercise: Doing physical therapy, twice a week    Blood glucose monitoring at home: 4x/d  Home blood sugar records: fasting range:  and postprandial range: 169-232  Any episodes of hypoglycemia? no    Diabetes standard of care  ACEi/ARB: yes  Statin: yes      Review of Systems   Constitutional: Negative for unexpected weight change.   Eyes: Negative for visual disturbance.   Respiratory: Negative for shortness of breath.    Cardiovascular: Negative for chest pain.  "  Gastrointestinal: Negative for abdominal pain.   Musculoskeletal: Positive for back pain and gait problem. Negative for arthralgias.   Skin: Negative for wound.   Allergic/Immunologic: Negative for food allergies.   Neurological: Negative for headaches.   Hematological: Does not bruise/bleed easily.       Social and family history reviewed  Current medications and allergies reviewed    Objective:   BP (!) 150/64   Pulse 76   Resp 18   Ht 5' 4" (1.626 m)   Wt 68.5 kg (150 lb 14.5 oz)   LMP  (LMP Unknown)   BMI 25.90 kg/m²   Physical Exam   Constitutional: She is oriented to person, place, and time. She appears well-developed and well-nourished.   HENT:   Head: Normocephalic and atraumatic.   Neck: Neck supple. No thyromegaly present.   Cardiovascular: Normal rate, regular rhythm and normal heart sounds.   Pulmonary/Chest: Effort normal. No respiratory distress.   Abdominal: Soft. There is no tenderness.   Neurological: She is alert and oriented to person, place, and time.   Skin: Skin is warm. No rash noted.   Psychiatric: She has a normal mood and affect. Her behavior is normal.     BP Readings from Last 1 Encounters:   01/06/20 (!) 150/64      Wt Readings from Last 1 Encounters:   01/06/20 0919 68.5 kg (150 lb 14.5 oz)     Body mass index is 25.9 kg/m².    Protective Sensation (w/ 10 gram monofilament):  Right: Intact  Left: Intact    Visual Inspection:  Normal -  Bilateral    Pedal Pulses:   Right: Present  Left: Present    Posterior tibialis:   Right:Present  Left: Present      Lab Review:   Lab Results   Component Value Date    HGBA1C 9.2 (H) 12/06/2019     Lab Results   Component Value Date    CHOL 169 08/22/2019    HDL 37 (L) 08/22/2019    LDLCALC 107.6 08/22/2019    TRIG 122 08/22/2019    CHOLHDL 21.9 08/22/2019     Lab Results   Component Value Date     11/21/2019    K 5.1 11/21/2019     11/21/2019    CO2 24 11/21/2019     (H) 11/21/2019    BUN 33 (H) 11/21/2019    CREATININE 1.2 " 11/21/2019    CALCIUM 10.4 11/21/2019    PROT 8.3 11/21/2019    ALBUMIN 3.6 11/21/2019    BILITOT 0.6 11/21/2019    ALKPHOS 102 11/21/2019    AST 16 11/21/2019    ALT 16 11/21/2019    ANIONGAP 14 11/21/2019    ESTGFRAFRICA 57.6 (A) 11/21/2019    EGFRNONAA 49.9 (A) 11/21/2019    TSH 2.852 08/22/2019       All pertinent labs reviewed    Assessment and Plan     DME (diabetic macular edema)  Continue Ophthalmology follow up    CAD (coronary artery disease)  Continue statin, beta blocker, ARB    Hyperlipidemia  Continue statin    Intraoperative ureteral injury  Followed by Urology    Type 2 diabetes mellitus with stage 2 chronic kidney disease and hypertension  A1c 9.2% indicates not controlled. BGs reports are mostly at goal however only checking twice a day. Only on pre-meal insulin. Will re-calculate insulin to a TDD 0.5 U/kg/day and divide in basal and bolus injections. Will obtain CGM to evaluate glucose variations throughout especially since they do not correlate very well with A1c. Based on CGM results will consider adding back GLP-1. Strongly encouraged to monitor weight and to not gain further weight as BMI is 25. Would not add SGLT-2 inhibitor at this time given history of  tract instrumentation with prior infections.    Plan:  - Start insulin Toujeo 18 U HS  - Decrease insulin Novolog to 6 U AC  - Professional CGM order placed  - A1c in 3 months    - Reviewed goals of therapy are to get the best control we can without hypoglycemia  - Reviewed patient's current insulin regimen. Clarified proper insulin dose and timing in relation to meals, etc. Insulin injection sites and proper rotation instructed.    - Advised frequent self blood glucose monitoring.  Patient encouraged to document glucose results and bring them to every clinic visit.  - Hypoglycemia precautions discussed. Instructed on precautions before driving.    - Call for BG repeatedly < 90 or > 180.   - Close adherence to lifestyle changes  recommended.   - Periodic follow ups for eye evaluations, foot care and dental care suggested.    Follow-up in 3 months    David Orozco MD  Endocrinology Fellow

## 2020-01-06 NOTE — ASSESSMENT & PLAN NOTE
A1c 9.2% indicates not controlled. BGs reports are mostly at goal. Only on pre-meal insulin. Will re-calculate insulin to a TDD 0.5 U/kg/day and divide in basal and bolus injections. Will obtain CGM to evaluate glucose variations throughout especially since they do not correlate very well with A1c. Based on CGM results will consider adding back GLP-1. Strongly encouraged to monitor weight and to not gain further weight as BMI is 25. Would not add SGLT-2 inhibitor at this time given history of  tract instrumentation with prior infections.    Plan:  - Start insulin Toujeo 18 U HS  - Decrease insulin Novolog to 6 U AC  - Professional CGM order placed  - A1c in 3 months    - Reviewed goals of therapy are to get the best control we can without hypoglycemia  - Reviewed patient's current insulin regimen. Clarified proper insulin dose and timing in relation to meals, etc. Insulin injection sites and proper rotation instructed.    - Advised frequent self blood glucose monitoring.  Patient encouraged to document glucose results and bring them to every clinic visit.  - Hypoglycemia precautions discussed. Instructed on precautions before driving.    - Call for BG repeatedly < 90 or > 180.   - Close adherence to lifestyle changes recommended.   - Periodic follow ups for eye evaluations, foot care and dental care suggested.      - Benefits from personal CGM as she is on MDI and testing POCT glucoses 4 times a day. Patient is willing and able to use the device, demonstrated an understanding of the technology and is motivated to use CGM. Patients expected to adhere to a comprehensive diabetes treatment plan and patient has adequate medical supervision.

## 2020-01-08 ENCOUNTER — TELEPHONE (OUTPATIENT)
Dept: NEUROSURGERY | Facility: CLINIC | Age: 60
End: 2020-01-08

## 2020-01-08 ENCOUNTER — DOCUMENTATION ONLY (OUTPATIENT)
Dept: REHABILITATION | Facility: HOSPITAL | Age: 60
End: 2020-01-08

## 2020-01-08 ENCOUNTER — CLINICAL SUPPORT (OUTPATIENT)
Dept: REHABILITATION | Facility: HOSPITAL | Age: 60
End: 2020-01-08
Attending: NEUROLOGICAL SURGERY
Payer: COMMERCIAL

## 2020-01-08 DIAGNOSIS — R29.898 WEAKNESS OF BOTH LOWER EXTREMITIES: ICD-10-CM

## 2020-01-08 DIAGNOSIS — R26.89 POOR BALANCE: ICD-10-CM

## 2020-01-08 PROCEDURE — 97110 THERAPEUTIC EXERCISES: CPT | Mod: PO,CQ

## 2020-01-08 NOTE — PROGRESS NOTES
Face to Face PTA Conference performed with Gualberto Murdock, LAUREN regarding patient's current status, overall progress, and plan of care    Face to Face PTA Conference performed with Florin Perdomo PT regarding patient's current status, overall progress, and plan of care    Gualberto Murdock  1/8/2020

## 2020-01-08 NOTE — TELEPHONE ENCOUNTER
----- Message from Mk George MD sent at 1/8/2020 10:54 AM CST -----  Patient is being lost again to follow-up. Please order a follow-up asap and call her everyday until the FU is being made.    [place]

## 2020-01-08 NOTE — PROGRESS NOTES
"  Physical Therapy Daily Treatment Note     Name: Mariann Huff  Clinic Number: [id number]    Therapy Diagnosis:   Encounter Diagnoses   Name Primary?    Weakness of both lower extremities     Poor balance      Physician: Mk George MD    Visit Date: 1/8/2020     Physician Orders: PT Eval and Treat   Medical Diagnosis from Referral:   Z98.1 (ICD-10-CM) - S/P lumbar fusion   M53.3 (ICD-10-CM) - Sacroiliac joint dysfunction of both sides   Evaluation Date: 11/11/2019  Authorization Period Expiration: 10/17/2020  Plan of Care Expiration: 01/11/2019  Visit # / Visits authorized: 6/20     Time In: 1:00 pm  Time Out: 1:40 pm  Total Billable Time: 25 minutes    Precautions: Standard    Subjective     Pt reports: she is hurting more today.  She was not compliant with home exercise program.  Response to previous treatment: less overall pain   Functional change: no longer needs an AD for ambulation     Pain: 4/10  Location: bilateral back      Objective     Mariann received therapeutic exercises to develop strength, endurance, ROM, flexibility, posture and core stabilization for 25 minutes including:       Date  01/08/2020 12/23/19 12/18/19 12/16/19 12/9/19 12/3/19   VISIT 6/20 5/20 5/20 4/20 3/20 2/20   POC EXP. DATE 01/11/2020 01/11/20 01/11/20 01/11/20 1/11/20 01/11/19   VISIT AMOUNT  MEDICARE TOTAL 60.64  60.64 60.64  477.04 60.64  416.40 60.64  355.76 90.96  295.12 90.96  204.16   FACE-TO-FACE 02/08/2020 01/03/20 01/03/20 01/03/20 1/3/20 01/03/19              TABLE:          HSS w/ strap 10 x 10" 10 x 10" 10 x 10" 10 x 10" 10 x 10" 10 x 10"   Bridge  2 x 10 w/ ball 2 x 10 w/ball  2 x 10 w/ball  2 x 10 w/ ball 1 x 15 w/ball 1 x 15 w/ball   LTR 2 x 10 2 x 10  2 x 10  2 x 10 1 x 15 1 x 15    Hip abduction  2 x 10 x 1# 2 x 10  2 x 10  2 x 10 1 x 15 SL 1 x 10 SL   Hip adduction  2 x 10 x 1# 2 x 10 2 x 10  2 x 10 1 x 15 SL 1 x 10 SL   SAQ 3 x 10 x 1.5# 3 x 10 x 1.5# 2 x 10 x 1# 2 x 10 x 1# 1 x 15 x 1# 1 x 15 x 1# " "  SLR 2 x 10 x 1.5# 2 x 10 1.5# 2 x 10 x 1# 2 x 10 x 1# 1 x 15 x 1# 1 x 15 x 1#               SEATED:          LAQs 3 x 10 x 1.5# 3 x 10 x 1.5# 2 x 10 x 1# 2 x 10 x 1# 1 x 15 x 1# 1 x 15 x 1#   Seated Hip Flex 3 x 10 x 1.5# 3 x 10 x 1.5# 2 x 10 x 1# 2 x 10 x 1# 1 x 15 x 1# 1 x 15 x 1#    Standing HS Curl  2 x 10 RTB 2 x 10 RTB 3 x 10 YTB 2 x 10 YTB 1 x 15 YTB 1 x 15 x 1#               STANDING:          Mini squats 1 x 15 on Foam 1 x 15 on Foam  1 x 15 on Foam  1 x 10 on Foam 1 x 10 on Foam 1 x 10 On foam    Hip ABD 2 x 10 YTB 2 x 10 YTB 2 x 10 YTB 1 x 15 YTB 1 x 10 YTB 1 x 10 YTB   Hip Ext  2 x 10 YTB 2 x 10 YTB 2 x 10  YTB 1 x 15 YTB 1 x 10 YTB 1 x 10 YTB   Side Stepping in squat  2 x 11 ft 2 x 11 ft   --- --  Next    Tandem Stance  2 x 30" 2 x 30" 2 x 30" 2 x 30" 2 x 30" 2 x 30"   Lateral Step Ups L1  2 x 10 L1 2 x 10  L1 1 x 15  L1  1 x 15 L1 1 x 10 L2 - NT   Standing Lat Pull Downs 2 x 10 GTB 2 x 10 GTB 2 x 10 RTB 2 x 10 RTB 1 x 15 RTB 1 x 15 RTB              Initials GWA 1/6 BJ BJ GWA 1/6 RAVI LYNCH        Home Exercises and Patient Education Provided     Education provided:   - practicing balance at home      Written Home Exercises Provided: yes.  Exercises were reviewed and Mariann was able to demonstrate them prior to the end of the session. Mariann demonstrated good  understanding of the education provided.      See EMR under Patient Instructions for exercises provided 11/11/2019.    Assessment     Mariann required increased verbal and tactile cueing to perform side stepping in squat.  Patient performed above exercise program with verbal cueing for proper technique and had no difficulty with today's progressions.    Mariann is progressing well towards her goals.   Pt prognosis is Good.     Pt will continue to benefit from skilled outpatient physical therapy to address the deficits listed in the problem list box on initial evaluation, provide pt/family education and to maximize pt's level of independence in " the home and community environment.     Pt's spiritual, cultural and educational needs considered and pt agreeable to plan of care and goals.     Anticipated barriers to physical therapy: none    Goals:   Short Term Goals: 4 weeks   Patient will be able to stand for 15-30 minutes consecutively with min-no pain/difficulty in order to prepare full meal. IN PROGRESS  Patient will be able to ambulate for 15-30 minutes consecutively with min-no pain/difficulty in order grocery shop.  IN PROGRESS  Patient will be able to sit for 30 minutes - 1 hour consecutively with min-no pain/difficulty in order to watch one TV Shop with family. IN PROGRESS     Long Term Goals: 8 weeks   Patient will be able to stand for 30 minutes-1 hour consecutively with min-no pain/difficulty in order to prepare full meal. IN PROGRESS  Patient will be able to ambulate 1 hour consecutively with min-no pain/difficulty in order grocery shop.  IN PROGRESS  Patient will be able to sit for 1-2 hours consecutively with min-no pain/difficulty in order to watch one TV Shop with family.  IN PROGRESS    Plan     Continue with PT POC.  Progress to 1# ankle weights to SL exercises next visit.     Gualberto Murdock, PTA,

## 2020-01-09 ENCOUNTER — OFFICE VISIT (OUTPATIENT)
Dept: OPHTHALMOLOGY | Facility: CLINIC | Age: 60
End: 2020-01-09
Payer: COMMERCIAL

## 2020-01-09 VITALS — HEART RATE: 76 BPM | SYSTOLIC BLOOD PRESSURE: 150 MMHG | DIASTOLIC BLOOD PRESSURE: 70 MMHG

## 2020-01-09 DIAGNOSIS — H35.033 HYPERTENSIVE RETINOPATHY, BILATERAL: ICD-10-CM

## 2020-01-09 PROCEDURE — 92201 OPSCPY EXTND RTA DRAW UNI/BI: CPT | Mod: S$GLB,,, | Performed by: OPHTHALMOLOGY

## 2020-01-09 PROCEDURE — 99999 PR PBB SHADOW E&M-EST. PATIENT-LVL III: ICD-10-PCS | Mod: PBBFAC,,, | Performed by: OPHTHALMOLOGY

## 2020-01-09 PROCEDURE — 99213 OFFICE O/P EST LOW 20 MIN: CPT | Mod: PBBFAC,PO | Performed by: OPHTHALMOLOGY

## 2020-01-09 PROCEDURE — 99999 PR PBB SHADOW E&M-EST. PATIENT-LVL III: CPT | Mod: PBBFAC,,, | Performed by: OPHTHALMOLOGY

## 2020-01-09 PROCEDURE — 92014 PR EYE EXAM, EST PATIENT,COMPREHESV: ICD-10-PCS | Mod: S$GLB,,, | Performed by: OPHTHALMOLOGY

## 2020-01-09 PROCEDURE — 92201 PR OPHTHALMOSCOPY, EXT, W/RET DRAW/SCLERAL DEPR, I&R, UNI/BI: ICD-10-PCS | Mod: S$GLB,,, | Performed by: OPHTHALMOLOGY

## 2020-01-09 PROCEDURE — 92014 COMPRE OPH EXAM EST PT 1/>: CPT | Mod: S$GLB,,, | Performed by: OPHTHALMOLOGY

## 2020-01-09 RX ORDER — FLUORESCEIN 500 MG/ML
5 INJECTION INTRAVENOUS ONCE
Status: DISCONTINUED | OUTPATIENT
Start: 2020-01-09 | End: 2020-02-12

## 2020-01-09 NOTE — PROGRESS NOTES
HPI     Diabetic retinopathy per Dr. Margot BERMUDEZ- 11/27/2019 Dr. Frost       Pt sts Diabetic over 10 years takes insulin   Vision seems to have been stable within the last year or two.   Wears bifocals rx is a month old   (-)Flashes (-)Floaters  (+)Photophobia  (+)Glare    No gtts     Grandfather & Aunt - Blind reason unknown     Cataract sx Dr. Domingo 2015 OU     Last edited by Liliane Westfall on 1/9/2020  9:01 AM. (History)        OCT - non central ME OU      A/P    1. Severe NPDR OU  T2 uncontrolled on insulin    2. DME OU  Non central  Monitor    3. HTN Ret OU  BS/BP/chol control    4. PCIOL OU    5. CAD  On PLavix        3 months OCT/FA OD

## 2020-01-09 NOTE — LETTER
January 9, 2020      Good Frost, OD  [address]  [place] [address]           [place] [facility]  [address]  [place] [address]  Phone: [phone]  Fax: [phone]          Patient: Mariann Huff   MR Number: [id number]   YOB: 1960   Date of Visit: 1/9/2020       Dear Dr. Good Frost:    Thank you for referring Mariann Huff to me for evaluation. Attached you will find relevant portions of my assessment and plan of care.    If you have questions, please do not hesitate to call me. I look forward to following Mariann Huff along with you.    Sincerely,    CHARLOTTE Doshi MD    Enclosure  CC:  No Recipients    If you would like to receive this communication electronically, please contact [email or web address] or [phone] to request more information on [employer] Link access.    For providers and/or their staff who would like to refer a patient to [facility], please contact us through our one-stop-shop provider referral line, [facility] , at [phone].    If you feel you have received this communication in error or would no longer like to receive these types of communications, please e-mail [email or web address]

## 2020-01-10 ENCOUNTER — TELEPHONE (OUTPATIENT)
Dept: CARDIOLOGY | Facility: CLINIC | Age: 60
End: 2020-01-10

## 2020-01-10 DIAGNOSIS — I25.10 CORONARY ARTERY DISEASE INVOLVING NATIVE CORONARY ARTERY OF NATIVE HEART WITHOUT ANGINA PECTORIS: Primary | ICD-10-CM

## 2020-01-10 DIAGNOSIS — I15.2 HYPERTENSION ASSOCIATED WITH DIABETES: ICD-10-CM

## 2020-01-10 DIAGNOSIS — E11.59 HYPERTENSION ASSOCIATED WITH DIABETES: ICD-10-CM

## 2020-01-10 DIAGNOSIS — I65.23 CAROTID STENOSIS, BILATERAL: ICD-10-CM

## 2020-01-10 DIAGNOSIS — E78.2 MIXED HYPERLIPIDEMIA: ICD-10-CM

## 2020-01-10 DIAGNOSIS — E11.51 TYPE 2 DIABETES MELLITUS WITH DIABETIC PERIPHERAL ANGIOPATHY WITHOUT GANGRENE, UNSPECIFIED WHETHER LONG TERM INSULIN USE: ICD-10-CM

## 2020-01-13 ENCOUNTER — CLINICAL SUPPORT (OUTPATIENT)
Dept: REHABILITATION | Facility: HOSPITAL | Age: 60
End: 2020-01-13
Attending: NEUROLOGICAL SURGERY
Payer: COMMERCIAL

## 2020-01-13 DIAGNOSIS — R26.89 POOR BALANCE: ICD-10-CM

## 2020-01-13 DIAGNOSIS — R29.898 WEAKNESS OF BOTH LOWER EXTREMITIES: ICD-10-CM

## 2020-01-13 PROCEDURE — 97110 THERAPEUTIC EXERCISES: CPT | Mod: PO

## 2020-01-13 NOTE — PROGRESS NOTES
"  Physical Therapy Daily Treatment Note     Name: Mraiann Huff  Clinic Number: [id number]    Therapy Diagnosis:   Encounter Diagnoses   Name Primary?    Weakness of both lower extremities     Poor balance      Physician: Mk George MD    Visit Date: 1/13/2020     Physician Orders: PT Eval and Treat   Medical Diagnosis from Referral:   Z98.1 (ICD-10-CM) - S/P lumbar fusion   M53.3 (ICD-10-CM) - Sacroiliac joint dysfunction of both sides   Evaluation Date: 11/11/2019  Authorization Period Expiration: 10/17/2020  Plan of Care Expiration: 01/11/2019  Visit # / Visits authorized: 7/20     Time In: 1:00 pm  Time Out: 1:38 pm  Total Billable Time: 25 minutes    Precautions: Standard    Subjective     Pt reports: she has been feeling good and getting stronger with therapy   She was not compliant with home exercise program.  Response to previous treatment: less overall pain   Functional change: no longer needs an AD for ambulation     Pain: 0/10  Location: bilateral back      Objective     Mariann received therapeutic exercises to develop strength, endurance, ROM, flexibility, posture and core stabilization for 25 minutes including:       Date  01/13/2020 01/08/2020 12/23/19 12/18/19 12/16/19 12/9/19 12/3/19   VISIT 7/20 6/20 5/20 5/20 4/20 3/20 2/20   POC EXP. DATE 01/11/2020 01/11/2020 01/11/20 01/11/20 01/11/20 1/11/20 01/11/19   VISIT AMOUNT  MEDICARE TOTAL 60.64  -- 60.64  60.64 60.64  477.04 60.64  416.40 60.64  355.76 90.96  295.12 90.96  204.16   FACE-TO-FACE 02/08/2020 02/08/2020 01/03/20 01/03/20 01/03/20 1/3/20 01/03/19               TABLE:           HSS w/ strap 10 x 10" 10 x 10" 10 x 10" 10 x 10" 10 x 10" 10 x 10" 10 x 10"   Bridge  2 x 10 w/TBall 2 x 10 w/ ball 2 x 10 w/ball  2 x 10 w/ball  2 x 10 w/ ball 1 x 15 w/ball 1 x 15 w/ball   LTR 2 x 10  2 x 10 2 x 10  2 x 10  2 x 10 1 x 15 1 x 15    Hip abduction  2 x 10 x 1.5# 2 x 10 x 1# 2 x 10  2 x 10  2 x 10 1 x 15 SL 1 x 10 SL   Hip adduction  2 x 10 x " "1.5# 2 x 10 x 1# 2 x 10 2 x 10  2 x 10 1 x 15 SL 1 x 10 SL   SAQ 2 x 10 x 2# 3 x 10 x 1.5# 3 x 10 x 1.5# 2 x 10 x 1# 2 x 10 x 1# 1 x 15 x 1# 1 x 15 x 1#   SLR 2 x 10 x 2# 2 x 10 x 1.5# 2 x 10 1.5# 2 x 10 x 1# 2 x 10 x 1# 1 x 15 x 1# 1 x 15 x 1#                SEATED:           LAQs 2 x 10 x 2# 3 x 10 x 1.5# 3 x 10 x 1.5# 2 x 10 x 1# 2 x 10 x 1# 1 x 15 x 1# 1 x 15 x 1#   Seated Hip Flex 2 x 10 x2# 3 x 10 x 1.5# 3 x 10 x 1.5# 2 x 10 x 1# 2 x 10 x 1# 1 x 15 x 1# 1 x 15 x 1#    Standing HS Curl  2 x 10 GTB 2 x 10 RTB 2 x 10 RTB 3 x 10 YTB 2 x 10 YTB 1 x 15 YTB 1 x 15 x 1#                STANDING:           Mini squats 2 x 10 on Foam 1 x 15 on Foam 1 x 15 on Foam  1 x 15 on Foam  1 x 10 on Foam 1 x 10 on Foam 1 x 10 On foam    Hip ABD 2 x 10 RTB 2 x 10 YTB 2 x 10 YTB 2 x 10 YTB 1 x 15 YTB 1 x 10 YTB 1 x 10 YTB   Hip Ext  2 x 10 RTB 2 x 10 YTB 2 x 10 YTB 2 x 10  YTB 1 x 15 YTB 1 x 10 YTB 1 x 10 YTB   Side Stepping in squat  2 x 11 ft  2 x 11 ft 2 x 11 ft   --- --  Next    Tandem Stance  2 x 30" 2 x 30" 2 x 30" 2 x 30" 2 x 30" 2 x 30" 2 x 30"   Lateral Step Ups L2 2 x 10  L1  2 x 10 L1 2 x 10  L1 1 x 15  L1  1 x 15 L1 1 x 10 L2 - NT   Standing Lat Pull Downs NT 2 x 10 GTB 2 x 10 GTB 2 x 10 RTB 2 x 10 RTB 1 x 15 RTB 1 x 15 RTB               Initials BJ GWA 1/6 BJ BJ GWA 1/6 MA BJ        Home Exercises and Patient Education Provided     Education provided:   - HEP     Written Home Exercises Provided: yes.  Exercises were reviewed and Mariann was able to demonstrate them prior to the end of the session. Mariann demonstrated good  understanding of the education provided.      See EMR under Patient Instructions for exercises provided 11/11/2019.    Assessment     Mariann performed above exercise program with verbal cueing for proper technique and had no difficulty with today's progressions.    Mariann is progressing well towards her goals.   Pt prognosis is Good.     Pt will continue to benefit from skilled outpatient " physical therapy to address the deficits listed in the problem list box on initial evaluation, provide pt/family education and to maximize pt's level of independence in the home and community environment.     Pt's spiritual, cultural and educational needs considered and pt agreeable to plan of care and goals.     Anticipated barriers to physical therapy: none    Goals:   Short Term Goals: 4 weeks   Patient will be able to stand for 15-30 minutes consecutively with min-no pain/difficulty in order to prepare full meal. IN PROGRESS  Patient will be able to ambulate for 15-30 minutes consecutively with min-no pain/difficulty in order grocery shop.  IN PROGRESS  Patient will be able to sit for 30 minutes - 1 hour consecutively with min-no pain/difficulty in order to watch one TV Shop with family. IN PROGRESS     Long Term Goals: 8 weeks   Patient will be able to stand for 30 minutes-1 hour consecutively with min-no pain/difficulty in order to prepare full meal. IN PROGRESS  Patient will be able to ambulate 1 hour consecutively with min-no pain/difficulty in order grocery shop.  IN PROGRESS  Patient will be able to sit for 1-2 hours consecutively with min-no pain/difficulty in order to watch one TV Shop with family.  IN PROGRESS    Plan     Continue with PT POC.      Mallory Dunne, PT, DPT

## 2020-01-14 ENCOUNTER — PATIENT OUTREACH (OUTPATIENT)
Dept: ADMINISTRATIVE | Facility: OTHER | Age: 60
End: 2020-01-14

## 2020-01-15 ENCOUNTER — OFFICE VISIT (OUTPATIENT)
Dept: NEUROSURGERY | Facility: CLINIC | Age: 60
End: 2020-01-15
Payer: COMMERCIAL

## 2020-01-15 VITALS — SYSTOLIC BLOOD PRESSURE: 175 MMHG | DIASTOLIC BLOOD PRESSURE: 83 MMHG | HEART RATE: 90 BPM

## 2020-01-15 DIAGNOSIS — Z98.1 STATUS POST LUMBAR SPINAL FUSION: Primary | ICD-10-CM

## 2020-01-15 PROCEDURE — 3079F DIAST BP 80-89 MM HG: CPT | Mod: CPTII,S$GLB,, | Performed by: NEUROLOGICAL SURGERY

## 2020-01-15 PROCEDURE — 3079F PR MOST RECENT DIASTOLIC BLOOD PRESSURE 80-89 MM HG: ICD-10-PCS | Mod: CPTII,S$GLB,, | Performed by: NEUROLOGICAL SURGERY

## 2020-01-15 PROCEDURE — 99999 PR PBB SHADOW E&M-EST. PATIENT-LVL III: ICD-10-PCS | Mod: PBBFAC,,, | Performed by: NEUROLOGICAL SURGERY

## 2020-01-15 PROCEDURE — 99212 OFFICE O/P EST SF 10 MIN: CPT | Mod: S$GLB,,, | Performed by: NEUROLOGICAL SURGERY

## 2020-01-15 PROCEDURE — 99212 PR OFFICE/OUTPT VISIT, EST, LEVL II, 10-19 MIN: ICD-10-PCS | Mod: S$GLB,,, | Performed by: NEUROLOGICAL SURGERY

## 2020-01-15 PROCEDURE — 3077F PR MOST RECENT SYSTOLIC BLOOD PRESSURE >= 140 MM HG: ICD-10-PCS | Mod: CPTII,S$GLB,, | Performed by: NEUROLOGICAL SURGERY

## 2020-01-15 PROCEDURE — 3077F SYST BP >= 140 MM HG: CPT | Mod: CPTII,S$GLB,, | Performed by: NEUROLOGICAL SURGERY

## 2020-01-15 PROCEDURE — 99999 PR PBB SHADOW E&M-EST. PATIENT-LVL III: CPT | Mod: PBBFAC,,, | Performed by: NEUROLOGICAL SURGERY

## 2020-01-15 RX ORDER — TRAMADOL HYDROCHLORIDE 50 MG/1
50 TABLET ORAL EVERY 8 HOURS PRN
Qty: 90 TABLET | Refills: 0 | Status: CANCELLED | OUTPATIENT
Start: 2020-01-15

## 2020-01-15 RX ORDER — OXYCODONE AND ACETAMINOPHEN 10; 325 MG/1; MG/1
1 TABLET ORAL EVERY 12 HOURS PRN
Qty: 40 TABLET | Refills: 0 | Status: ON HOLD | OUTPATIENT
Start: 2020-01-15 | End: 2020-02-21 | Stop reason: HOSPADM

## 2020-01-15 RX ORDER — BACLOFEN 10 MG/1
10 TABLET ORAL 3 TIMES DAILY
Qty: 90 TABLET | Refills: 11 | Status: CANCELLED | OUTPATIENT
Start: 2020-01-15 | End: 2021-01-14

## 2020-01-15 NOTE — PROGRESS NOTES
NEUROSURGICAL PROGRESS NOTE    DATE OF SERVICE:  01/15/2020    ATTENDING PHYSICIAN:  Mk George MD    SUBJECTIVE:    INTERIM HISTORY:    This is a very pleasant 59 y.o. female, who is status post 9 months L5-S1 oblique interbody fusion with posterior instrumentation.  She completed a 2nd 6 weeks of physical therapy.  She has regained the weight that she has lost after her long hospitalization following her interbody fusion.  She has occasional right SI joint area pain radiating in the buttock and posterolateral thigh.  She has mild right dorsiflexion weakness.  She reports that the Norco 5 mg are not helping with her pain.  She is walking better without a cane now.  She has regained significant strength in her bilateral legs.  Her balance is better.  She is scheduled to have a left ureter reimplantation with Dr. Boyd at the end of the month.  She would like to have a stronger narcotic to use only for severe pain when she travels.  No new weakness, numbness or sphincter dysfunction.              PAST MEDICAL HISTORY:  Active Ambulatory Problems     Diagnosis Date Noted    Hypertension associated with diabetes     Hyperlipidemia     CAD (coronary artery disease)     Sleep apnea     Carotid stenosis, bilateral 10/24/2012    NSTEMI (non-ST elevated myocardial infarction) 02/17/2014    S/P coronary artery stent placement 02/26/2014    Nuclear sclerosis - Right Eye 03/18/2014    Primary localized osteoarthrosis, lower leg 06/18/2014    Chronic sinusitis 05/07/2015    PSC (posterior subcapsular cataract), right 08/17/2015    DME (diabetic macular edema) 08/17/2015    Refractive error 08/17/2015    Pseudophakia 08/17/2015    Senile nuclear sclerosis 09/01/2015    Type 2 diabetes mellitus with diabetic peripheral angiopathy without gangrene 02/24/2016    Diabetic peripheral neuropathy associated with type 2 diabetes mellitus 02/24/2016    Cervical arthritis 08/29/2016    Neurogenic claudication  08/29/2016    Lumbar herniated disc 10/24/2016    Fatty liver disease, nonalcoholic 11/01/2017    Arthritis of lumbar spine 07/23/2018    Chronic pain 09/25/2018    Lumbar radiculopathy 02/06/2019    Lumbosacral radiculopathy at L5 04/12/2019    Ureteral transection of left native kidney 04/13/2019    Type 2 diabetes mellitus with stage 2 chronic kidney disease and hypertension 04/20/2019    Asthma 04/20/2019    Normocytic anemia     Bradycardia 05/08/2019    Delayed surgical wound healing 05/13/2019    Rectal ulcer     Palliative care encounter 05/27/2019    Protein malnutrition     Acute deep vein thrombosis (DVT) of femoral vein of right lower extremity     Impaired functional mobility and endurance 05/31/2019    (HFpEF) heart failure with preserved ejection fraction 06/06/2019    Alteration in skin integrity 06/07/2019    Debility 06/10/2019    Staph infection     Chronic combined systolic and diastolic CHF (congestive heart failure) 07/02/2019    Nephrostomy status 07/11/2019    Nephrostomy tube displaced 07/18/2019    Left ureteral injury 07/24/2019    Hydronephrosis 10/07/2019    Serum albumin decreased 11/01/2019    Weakness of both lower extremities 11/11/2019    Poor balance 11/11/2019    Intraoperative ureteral injury 12/11/2019    Uncontrolled type 2 diabetes mellitus with both eyes affected by severe nonproliferative retinopathy and macular edema, with long-term current use of insulin 01/09/2020    Hypertensive retinopathy, bilateral 01/09/2020     Resolved Ambulatory Problems     Diagnosis Date Noted    Diabetes mellitus type II, uncontrolled 10/24/2012    Obesity 10/24/2012    Neck pain 03/07/2013    Non compliance with medical treatment 06/19/2013    PAPA (acute kidney injury) 02/17/2014    Coronary stent restenosis 02/26/2014    Type II or unspecified type diabetes mellitus with other specified manifestations, uncontrolled 06/06/2014    Peripheral neuropathy  06/06/2014    Enthesopathy of hip region 06/18/2014    Type II or unspecified type diabetes mellitus with peripheral circulatory disorders, uncontrolled(250.72) 09/25/2014    Diabetes mellitus with peripheral artery disease 02/02/2015    Dysphagia 08/12/2016    Neck pain on right side 10/14/2016    Chronic bilateral low back pain with sciatica 10/14/2016    Decreased range of motion 10/14/2016    Decreased strength 10/14/2016    Decreased functional mobility 10/14/2016    Closed nondisplaced fracture of fifth metatarsal bone of right foot with routine healing 07/14/2017    Ureteral stent retained 04/16/2019    Sepsis 04/20/2019    Encephalopathy 04/20/2019    Altered mental status     Encounter for weaning from ventilator     Metabolic acidosis     At risk for fluid volume overload 04/21/2019    On enteral nutrition 04/24/2019    Postoperative infection     Acute postoperative respiratory failure     Pulmonary edema     Dermatitis associated with moisture 05/06/2019    BRBPR (bright red blood per rectum) 05/07/2019    Urinary tract infection without hematuria     Status post insertion of percutaneous endoscopic gastrostomy (PEG) tube     Pain     Acute on chronic respiratory failure with hypoxia 06/06/2019    Elevated troponin 06/06/2019    Fever 06/07/2019    Pneumonia of both lungs due to Pseudomonas species 06/09/2019    Acute combined systolic and diastolic heart failure 06/13/2019    Excessive carbohydrate intake 06/14/2019    Tracheostomy in place     Weakness of both lower extremities 08/19/2019    Poor balance 08/19/2019    Gait, antalgic 08/19/2019     Past Medical History:   Diagnosis Date    Anticoagulant long-term use     Asthma     Back pain     Bradycardia, unspecified 5/8/2019    Carotid artery stenosis     Diabetes mellitus type 2 in obese     HTN (hypertension), benign     Keloid cicatrix        PAST SURGICAL HISTORY:  Past Surgical History:   Procedure  Laterality Date    ANTEGRADE NEPHROSTOGRAPHY Left 2019    Procedure: Nephrostogram - antegrade;  Surgeon: Robin Boyd MD;  [facility];  Service: Urology;  Laterality: Left;    BRONCHOSCOPY N/A 2019    Procedure: BRONCHOSCOPY;  Surgeon: Sean Ruano MD;  [facility];  Service: General;  Laterality: N/A;    CARDIAC CATHETERIZATION      CATARACT EXTRACTION      cataract extraction left eye      cataracts      CAUDAL EPIDURAL STEROID INJECTION N/A 2019    Procedure: Injection-steroid-epidural-caudal;  Surgeon: Dave Bentley Jr., MD;  Location: [facility] PAIN MGT;  Service: Pain Management;  Laterality: N/A;     SECTION, LOW TRANSVERSE      COLONOSCOPY N/A 2019    Procedure: COLONOSCOPY;  Surgeon: Al Alaniz MD;  Location: [facility] ENDO (2ND FLR);  Service: Endoscopy;  Laterality: N/A;    CORONARY ANGIOPLASTY      CYSTOGRAM N/A 2019    Procedure: CYSTOGRAM INTRAOP;  Surgeon: Robin Boyd MD;  [facility];  Service: Urology;  Laterality: N/A;  1 HOUR    CYSTOSCOPY W/ RETROGRADES Left 2019    Procedure: CYSTOSCOPY, WITH RETROGRADE PYELOGRAM;  Surgeon: Robin Boyd MD;  [facility];  Service: Urology;  Laterality: Left;  fluro    ESOPHAGOGASTRODUODENOSCOPY W/ PEG  2019    Procedure: EGD, WITH PEG TUBE INSERTION;  Surgeon: Sean Ruano MD;  [facility];  Service: General;;    EXCISION TURBINATE, SUBMUCOUS      FLEXIBLE SIGMOIDOSCOPY N/A 2019    Procedure: SIGMOIDOSCOPY, FLEXIBLE;  Surgeon: ALBERTO Amin MD;  Location: [facility] ENDO (2ND FLR);  Service: Endoscopy;  Laterality: N/A;    FLEXIBLE SIGMOIDOSCOPY N/A 2019    Procedure: SIGMOIDOSCOPY, FLEXIBLE;  Surgeon: ALBERTO Amin MD;  Location: [facility] ENDO (2ND FLR);  Service: Endoscopy;  Laterality: N/A;    FUSION OF LUMBAR SPINE BY ANTERIOR APPROACH Left 2019    Procedure: FUSION, SPINE, LUMBAR, ANTERIOR APPROACH  Left L5-S1 Anterior to Psoa Interbody Fusion, L5-S1 Posterior Instrumentation;  Surgeon: Mk George MD;  [facility];  Service: Neurosurgery;  Laterality: Left;  Porcedure:  Left L5-S1 Anterior to Psoa Interbody Fusion, L5-S1 Posterior Instrumentation  Surgery Time: 4 Hrs  LOS: 2-3  Anesthesia: General   Blood: Type & Screen  R    HAND SURGERY Left     HAND SURGERY Right     torn ligament repair/ Dr. Yeboah    HYSTERECTOMY      left foot surgery      left wrist surgery      NASAL SEPTUM SURGERY  5/7/15    PERCUTANEOUS NEPHROSTOMY Left 4/21/2019    Procedure: Creation, Nephrostomy, Percutaneous;  Surgeon: Karina Surgeon;  Location: [facility];  Service: Anesthesiology;  Laterality: Left;    REPAIR OF URETER  4/12/2019    Procedure: REPAIR, URETER;  Surgeon: Mk George MD;  [facility];  Service: Neurosurgery;;    REPLACEMENT OF NEPHROSTOMY TUBE N/A 7/18/2019    Procedure: REPLACEMENT, NEPHROSTOMY TUBE;  Surgeon: [facility] Diagnostic Provider;  [facility];  Service: Anesthesiology;  Laterality: N/A;  188    REPLACEMENT OF NEPHROSTOMY TUBE N/A 7/24/2019    Procedure: REPLACEMENT, NEPHROSTOMY TUBE;  Surgeon: [facility] Diagnostic Provider;  [facility];  Service: Anesthesiology;  Laterality: N/A;  188    REPLACEMENT OF NEPHROSTOMY TUBE N/A 10/7/2019    Procedure: REPLACEMENT, NEPHROSTOMY TUBE;  Surgeon: [facility] Diagnostic Provider;  [facility];  Service: Anesthesiology;  Laterality: N/A;  189    REPLACEMENT OF NEPHROSTOMY TUBE N/A 11/25/2019    Procedure: REPLACEMENT, NEPHROSTOMY TUBE;  Surgeon: [facility] Diagnostic Provider;  [facility];  Service: Anesthesiology;  Laterality: N/A;  Room 188/Bessy    rt elbow surgery      S/P LAD COATED STENT  05/14/2010    6 total     S/P OM1 STENT  08/2003    SINUS SURGERY      F.E.S.S.    TRACHEOSTOMY N/A 5/2/2019    Procedure: CREATION, TRACHEOSTOMY;  Surgeon: Sean Ruano MD;   Location: [facility] OR [address] [place] [address];  Service: General;  Laterality: N/A;    TUBAL LIGATION         SOCIAL HISTORY:   Social History     Socioeconomic History    Marital status:      Spouse name: Shamir    Number of children: 2    Years of education: Not on file    Highest education level: Not on file   Occupational History    Occupation: cafeteria     Employer: [employer]     Employer: [employer]     Employer: [employer]   Social Needs    Financial resource strain: Not on file    Food insecurity:     Worry: Not on file     Inability: Not on file    Transportation needs:     Medical: Not on file     Non-medical: Not on file   Tobacco Use    Smoking status: Never Smoker    Smokeless tobacco: Never Used   Substance and Sexual Activity    Alcohol use: No     Alcohol/week: 0.0 standard drinks    Drug use: No    Sexual activity: Yes     Partners: Male     Birth control/protection: Post-menopausal     Comment:    Lifestyle    Physical activity:     Days per week: Not on file     Minutes per session: Not on file    Stress: Not on file   Relationships    Social connections:     Talks on phone: Not on file     Gets together: Not on file     Attends Zoroastrianism service: Not on file     Active member of club or organization: Not on file     Attends meetings of clubs or organizations: Not on file     Relationship status: Not on file   Other Topics Concern    Are you pregnant or think you may be? No    Breast-feeding No   Social History Narrative    . 2 children.        FAMILY HISTORY:  Family History   Problem Relation Age of Onset    Diabetes Mother     Heart disease Mother     Diabetes Father     Leukemia Father         leukemia    Heart attack Father     Diabetes Sister     Diabetes Brother     Diabetes Sister     No Known Problems Sister     No Known Problems Brother     No Known Problems Brother     No Known Problems Maternal Grandmother      No Known Problems Maternal Grandfather     No Known Problems Paternal Grandmother     No Known Problems Paternal Grandfather     No Known Problems Son     No Known Problems Son     No Known Problems Maternal Aunt     No Known Problems Maternal Uncle     No Known Problems Paternal Aunt     No Known Problems Paternal Uncle     Colon cancer Neg Hx     Inflammatory bowel disease Neg Hx     Melanoma Neg Hx     Psoriasis Neg Hx     Lupus Neg Hx     Eczema Neg Hx     Acne Neg Hx     Amblyopia Neg Hx     Blindness Neg Hx     Cancer Neg Hx     Cataracts Neg Hx     Glaucoma Neg Hx     Hypertension Neg Hx     Macular degeneration Neg Hx     Retinal detachment Neg Hx     Strabismus Neg Hx     Stroke Neg Hx     Thyroid disease Neg Hx     Heart failure Neg Hx     Hyperlipidemia Neg Hx        CURRENTS MEDICATIONS:  Current Outpatient Medications on File Prior to Visit   Medication Sig Dispense Refill    ACCU-CHEK EDIN PLUS METER Misc       albuterol (PROVENTIL/VENTOLIN HFA) 90 mcg/actuation inhaler Inhale 2 puffs into the lungs every 6 (six) hours as needed for Wheezing. 1 each 11    albuterol-ipratropium (DUO-NEB) 2.5 mg-0.5 mg/3 mL nebulizer solution Inhale 3 mLs into the lungs.      amLODIPine (NORVASC) 10 MG tablet 10 mg by Tube route.      amLODIPine (NORVASC) 10 MG tablet TAKE 1 TABLET BY MOUTH EVERY DAY 90 tablet 2    aspirin 81 mg Tab Take 81 mg by mouth. 1 Tablet Oral Every day      atorvastatin (LIPITOR) 80 MG tablet 1 tablet (80 mg total) by Per G Tube route once daily. 90 tablet 3    blood sugar diagnostic (ACCU-CHEK EDIN PLUS TEST STRP) Strp TEST BLOOD SUGARS 4 TIMES DAILY 150 strip 11    cefadroxil (DURICEF) 1 gram tablet TAKE 1 TABLET (1 G TOTAL) BY MOUTH 2 (TWO) TIMES DAILY.  3    clopidogrel (PLAVIX) 75 mg tablet Take 1 tablet (75 mg total) by mouth once daily. 90 tablet 3    escitalopram oxalate (LEXAPRO) 10 MG tablet Take 1 tablet (10 mg total) by mouth once daily. 30  tablet 11    famotidine (PEPCID) 20 MG tablet Take 1 tablet (20 mg total) by mouth 2 (two) times daily. 60 tablet 11    furosemide (LASIX) 40 MG tablet Take 1 tablet (40 mg total) by mouth once daily. 30 tablet 11    gabapentin (NEURONTIN) 300 MG capsule Take 1 capsule (300 mg total) by mouth 2 (two) times daily. 60 capsule 11    insulin aspart U-100 (NOVOLOG FLEXPEN U-100 INSULIN) 100 unit/mL (3 mL) InPn pen Inject 6 Units into the skin 3 (three) times daily with meals. 6 mL 5    insulin glargine U-300 conc (TOUJEO MAX U-300 SOLOSTAR) 300 unit/mL (3 mL) InPn Inject 18 Units into the skin every evening. 6 mL 5    losartan (COZAAR) 50 MG tablet Take 1 tablet (50 mg total) by mouth once daily. 90 tablet 3    metoprolol tartrate (LOPRESSOR) 25 MG tablet Take 1 tablet (25 mg total) by mouth 2 (two) times daily. 60 tablet 11    multivitamin (THERAGRAN) per tablet Take 1 tablet by mouth once daily.      nitroGLYCERIN (NITROSTAT) 0.4 MG SL tablet Take one every 2-3 min till chestpain relief for 3 times and if still no relief, call MD or come to ED 30 tablet 11    ondansetron (ZOFRAN-ODT) 8 MG TbDL Take 1 tablet (8 mg total) by mouth every 12 (twelve) hours as needed. 30 tablet 2    potassium chloride SA (K-DUR,KLOR-CON) 20 MEQ tablet Take 1 tablet (20 mEq total) by mouth 2 (two) times daily. 60 tablet 11    zolpidem (AMBIEN) 5 MG Tab Take 1 tablet (5 mg total) by mouth nightly as needed. 30 tablet 2    HYDROcodone-acetaminophen (NORCO) 5-325 mg per tablet Take 1 tablet by mouth every 6 (six) hours as needed for Pain. (Patient not taking: Reported on 1/15/2020) 28 tablet 0    rifAMpin (RIFADIN) 300 MG capsule        Current Facility-Administered Medications on File Prior to Visit   Medication Dose Route Frequency Provider Last Rate Last Dose    fluorescein 500 mg/5 mL (10 %) injection 500 mg  5 mL Intravenous Once D. Pancho Doshi MD        lidocaine (PF) 10 mg/ml (1%) injection 10 mg  1 mL Intradermal  Once Dave Bentley Jr., MD        lidocaine HCl 2% urojet   Urethral Once Robin Boyd MD           ALLERGIES:  Review of patient's allergies indicates:   Allergen Reactions    Milk containing products      Causes GI distress       REVIEW OF SYSTEMS:  Review of Systems   Constitutional: Negative for diaphoresis, fever and weight loss.   Respiratory: Negative for shortness of breath.    Cardiovascular: Negative for chest pain.   Gastrointestinal: Negative for blood in stool.   Genitourinary: Negative for hematuria.   Endo/Heme/Allergies: Does not bruise/bleed easily.   All other systems reviewed and are negative.        OBJECTIVE:    PHYSICAL EXAMINATION:   Vitals:    01/15/20 1337   BP: (!) 175/83   Pulse: 90       Physical Exam:  Vitals reviewed.    Constitutional: She appears well-developed and well-nourished.     Eyes: Pupils are equal, round, and reactive to light. Conjunctivae and EOM are normal.     Cardiovascular: Normal distal pulses and no edema.     Abdominal: Soft.     Skin: Skin displays no rash on trunk and no rash on extremities. Skin displays no lesions on trunk and no lesions on extremities.     Psych/Behavior: She is alert. She is oriented to person, place, and time. She has a normal mood and affect.     Musculoskeletal:        Neck: Range of motion is full.     Neurological:        DTRs: Tricep reflexes are 2+ on the right side and 2+ on the left side. Bicep reflexes are 2+ on the right side and 2+ on the left side. Brachioradialis reflexes are 2+ on the right side and 2+ on the left side. Patellar reflexes are 2+ on the right side and 2+ on the left side. Achilles reflexes are 2+ on the right side and 2+ on the left side.       Back Exam     Tenderness   The patient is experiencing tenderness in the sacroiliac (Right side).    Range of Motion   Extension: normal   Flexion: normal   Lateral bend right: normal   Lateral bend left: normal   Rotation right: normal   Rotation left: normal      Muscle Strength   Right Quadriceps:  5/5   Left Quadriceps:  5/5   Right Hamstrings:  5/5   Left Hamstrings:  5/5     Tests   Straight leg raise right: negative  Straight leg raise left: negative    Other   Toe walk: normal  Heel walk: normal              Neurologic Exam     Mental Status   Oriented to person, place, and time.   Speech: speech is normal   Level of consciousness: alert    Cranial Nerves   Cranial nerves II through XII intact.     CN III, IV, VI   Pupils are equal, round, and reactive to light.  Extraocular motions are normal.     Motor Exam   Muscle bulk: normal  Overall muscle tone: normal    Strength   Right deltoid: 5/5  Left deltoid: 5/5  Right biceps: 5/5  Left biceps: 5/5  Right triceps: 5/5  Left triceps: 5/5  Right wrist flexion: 5/5  Left wrist flexion: 5/5  Right wrist extension: 5/5  Left wrist extension: 5/5  Right interossei: 5/5  Left interossei: 5/5  Right iliopsoas: 5/5  Left iliopsoas: 5/5  Right quadriceps: 5/5  Left quadriceps: 5/5  Right hamstrin/5  Left hamstrin/5  Right anterior tibial: 4/5  Left anterior tibial: 5/5  Right posterior tibial: 5/5  Left posterior tibial: 5/5  Right peroneal: 5/5  Left peroneal: 5/5  Right gastroc: 5/5  Left gastroc: 5/5    Sensory Exam   Light touch normal.   Pinprick normal.     Gait, Coordination, and Reflexes     Gait  Gait: normal    Coordination   Finger to nose coordination: normal  Tandem walking coordination: normal    Reflexes   Right brachioradialis: 2+  Left brachioradialis: 2+  Right biceps: 2+  Left biceps: 2+  Right triceps: 2+  Left triceps: 2+  Right patellar: 2+  Left patellar: 2+  Right achilles: 2+  Left achilles: 2+  Right plantar: normal  Left plantar: normal  Right Crump: absent  Left Crump: absent  Right ankle clonus: absent  Left ankle clonus: absent        DIAGNOSTIC DATA:  I personally interpreted the following imaging:   CT scan of the abdomen 2019 shows consolidation of the L5-S1  fusion    ASSESMENT:  This is a 59 y.o. female with     Problem List Items Addressed This Visit     None      Visit Diagnoses     Status post lumbar spinal fusion    -  Primary            PLAN:  I recommended to do a EMG nerve conduction study to assess the right partial dorsiflexion weakness the can be from radiculopathy versus chronic peroneal nerve entrapment following rapid weight loss.  She could also benefit from a right SI joint block and steroid injection in the future.  She will wait before having the EMG done and the right SI joint injection done as she will have urology surgery at the end of the month.  All questions answered  Percocet 10 mg to take twice daily as needed for severe pain.  Forty pills ordered          Mk George MD  Cell:[phone]

## 2020-01-20 ENCOUNTER — PATIENT OUTREACH (OUTPATIENT)
Dept: ADMINISTRATIVE | Facility: OTHER | Age: 60
End: 2020-01-20

## 2020-01-21 ENCOUNTER — OFFICE VISIT (OUTPATIENT)
Dept: UROLOGY | Facility: CLINIC | Age: 60
End: 2020-01-21
Payer: COMMERCIAL

## 2020-01-21 VITALS
SYSTOLIC BLOOD PRESSURE: 141 MMHG | BODY MASS INDEX: 26.41 KG/M2 | WEIGHT: 153.88 LBS | DIASTOLIC BLOOD PRESSURE: 70 MMHG | HEART RATE: 90 BPM

## 2020-01-21 DIAGNOSIS — S37.10XS: Primary | Chronic | ICD-10-CM

## 2020-01-21 DIAGNOSIS — E88.09 SERUM ALBUMIN DECREASED: ICD-10-CM

## 2020-01-21 DIAGNOSIS — Z93.6 NEPHROSTOMY STATUS: Chronic | ICD-10-CM

## 2020-01-21 DIAGNOSIS — Z12.39 BREAST CANCER SCREENING: ICD-10-CM

## 2020-01-21 DIAGNOSIS — Z12.31 ENCOUNTER FOR SCREENING MAMMOGRAM FOR BREAST CANCER: Primary | ICD-10-CM

## 2020-01-21 PROCEDURE — 3046F HEMOGLOBIN A1C LEVEL >9.0%: CPT | Mod: CPTII,S$GLB,, | Performed by: UROLOGY

## 2020-01-21 PROCEDURE — 99999 PR PBB SHADOW E&M-EST. PATIENT-LVL II: ICD-10-PCS | Mod: PBBFAC,,, | Performed by: UROLOGY

## 2020-01-21 PROCEDURE — 3046F PR MOST RECENT HEMOGLOBIN A1C LEVEL > 9.0%: ICD-10-PCS | Mod: CPTII,S$GLB,, | Performed by: UROLOGY

## 2020-01-21 PROCEDURE — 3077F SYST BP >= 140 MM HG: CPT | Mod: CPTII,S$GLB,, | Performed by: UROLOGY

## 2020-01-21 PROCEDURE — 99215 PR OFFICE/OUTPT VISIT, EST, LEVL V, 40-54 MIN: ICD-10-PCS | Mod: S$GLB,,, | Performed by: UROLOGY

## 2020-01-21 PROCEDURE — 3078F PR MOST RECENT DIASTOLIC BLOOD PRESSURE < 80 MM HG: ICD-10-PCS | Mod: CPTII,S$GLB,, | Performed by: UROLOGY

## 2020-01-21 PROCEDURE — 3077F PR MOST RECENT SYSTOLIC BLOOD PRESSURE >= 140 MM HG: ICD-10-PCS | Mod: CPTII,S$GLB,, | Performed by: UROLOGY

## 2020-01-21 PROCEDURE — 99215 OFFICE O/P EST HI 40 MIN: CPT | Mod: S$GLB,,, | Performed by: UROLOGY

## 2020-01-21 PROCEDURE — 3008F PR BODY MASS INDEX (BMI) DOCUMENTED: ICD-10-PCS | Mod: CPTII,S$GLB,, | Performed by: UROLOGY

## 2020-01-21 PROCEDURE — 3008F BODY MASS INDEX DOCD: CPT | Mod: CPTII,S$GLB,, | Performed by: UROLOGY

## 2020-01-21 PROCEDURE — 99999 PR PBB SHADOW E&M-EST. PATIENT-LVL II: CPT | Mod: PBBFAC,,, | Performed by: UROLOGY

## 2020-01-21 PROCEDURE — 3078F DIAST BP <80 MM HG: CPT | Mod: CPTII,S$GLB,, | Performed by: UROLOGY

## 2020-01-21 NOTE — PROGRESS NOTES
Chart reviewed.   Immunizations: updated  Orders placed: n/a  Upcoming appts: n/a  Staff message sent to Ces[facility] pcp care coordinator to obtain diagnostic mammogram order.

## 2020-01-21 NOTE — PROGRESS NOTES
CC: left nephrostomy tube following left ureteral injury. Last left neph tube changed on 10/7/19, c/o urine leak at the neph tube connection    Mariann Huff is a 59 y.o. woman who is here to discuss her urologic surgery to treat left ureteral injury which happened during her neurologic spine surgery.  Has had left nephrostomy tube since 4/20/19.  Last left neph tube was changed by IR on 11/25/19.  She has been doing well. Got anew cane and has been able to walk much better.  She is doing well overall.  She saw Dr. Randolph in endocrinology to manage her poorly controlled DM.  Denies any fever or chills.    Dr. George in neurosurgery pointed out that she made a good progress from her back surgery.  She can undergo urologic surgery if necessary.  We have been watching her physical recovery as well as nutritional status ( has had low albumin).  She walks with a cane.  She is here today with her .    Mariann Huff underwent left anterior L5-S1 interbody fusion who sustained a left ureteral transection on 4/12/19.  She underwent a ureteroureterostomy with stent placement at that time. She presented to the ER 4/20/19 with AMS, fever and respiratory distress. On imaging she was found to have a large amount of air and fluid within the retroperitoneal and within the left collecting system. This was not amenable to drainage by IR therefore she was taken to the OR for washout.     Procedure(s) Performed: 4/20/19  1. Exploratory laparotomy  2. Ligation of left ureter  3. Removal of left JJ ureteral stent    Findings:   - left retroperitoneum developed up to lower pole of the left kidney  - pockets of purulence identified and evacuated, cultures sent  - left ureteral anastomosis found to be completely broken down with stent clearly visible  - posterior to the ureter, spinal hardware was visibly exposed  - proximal end of the ureter clipped and stent removed as tissue felt to be unsuitable for repair  - no bowel injury  noted    Subsequently she had left nephrostomy tube placed by IR on 4/20/19.  She was discharged from hospital after a long complicated course on 7/2/19.  She is here for urology follow up.  She reports that she has been able to void well thru the bladder as well as via left nephrostomy tube.    The below is summary of her previous discharge notes.  history of Asthma, HTN, HLD, CAD (LAD GINGER proximal and distal 2013 and GINGER ostial LAD 2/2014), HFpEF, NSTEMI, T2DM, Obesity, Sleep Apnea, and back pain who presented to the ED on 6/6/19 with acute shortness of breath started the night before. Mrs. Huff has had two recent hospitalizations. 4/12/19-4/14/19 for L5-S1 OLIF with Neurosurgery with intraoperaative left ureteral injury with ureteroureteral anastomosis and ureteral stent placement. Second admission, 4/20/19-6/5/19 for intraabdominal abscess s/p washout and ligation of left ureter with nephrostomy tube placement on 4/21/19 and tracheostomy and PEG placement on 5/2/19.  Most recent hospitalization complicated by BUE and RLE DVT s/p IVC filter on 5/29/19 given concern for GIB with workup revealing rectal ulcer requiring pRBC transfusion. She was followed by ID during admission and is being treated with long term antibiotic therapy for E coli and Staph lugdenesis bacteremia with IV cefazolin and rifampin with end dated 6/18/19 given hardware.  She also completed 7 day course of cefepime for HAP (pseudomonas) on 5/25/19 and candida glabrata UTI that she received 7 days of high dose fluconazole.  She was discharged to rehab. She as admitted to ICU where she was found to have acute blood loss from rectal ulcer. Patient additionally developed NSTEMI from the anemia and pseudomonal hospital associated pneumonia. Imaging showed persisting fluid collection along the midline abdominal incision. IR aspirated fluid collection and cultures are pending. ID recommended continue meropenem until 6/15 and rifampin 300 mg BID and  will require suppressive therapy indefinitely with cefadroxil and rifampin (due to hardware presence) once meropenem completed. She has intermittent back pain that is achy. Patient admitted to LTAC for IV antibiotics, ventilator weaning.  Patient completed antimicrobial therapy for infective intra-abdominal abscess.  Tolerated ventilator weaning, trach Decanulated 6/19. Rafampin stopped on 6/19 second to nausea.  Patient's appetite improved dysphagia resolved nausea vomiting resolved rifampin resumed at 600 mg 3 times a week per Infectious Disease recommendations nausea did not return after resuming rifampin.  Patient advised to continue rifampin and cefadroxil indefinitely as suppressive therapy for possible hardware involvement.  Advised she will need a follow-up with Infectious Disease before stopping these medications.  She will also need follow-up with Urology for her left nephrostomy drain neurosurgery, in GI evaluate removing PEG tube.  Home health arranged with [facility].  DME ordered.  Patient advised inpatient rehab strongly recommended for weakness and debility status post recent fall last week.  Patient declined inpatient rehab adamant about returning home  in son to assist with patient care at home.  Discharge coordinated per , home health, and patient.  Patient provided with script for Norco for pain management until she can follow up with her primary care provider.      Hospital Course:     Acute on chronic respiratory failure with hypoxia, resolved   Hospital acquired pseudomonal pneumonia, resolved  S/P trach decannulation 6/19                 Continue scopalamine patch q3 days              Daily weights              Strict Is/Os              Lasix                    NSTEMI type II  CAD s/p stent  Essential HTN   Acute on chronic systolic and diastolic dysfunction, chronic, stable              ASA 81 gm daily              Atorvastatin 80 mg daily              Losartan  25 mg daily              Metoprolol 25 mg BID              Clopidogrel 75 mg daily due to recent NSTEMI              Risks factor modification,  Low salt diet, low-fat diet, exercise        Intraabdominal abscess   E. Coli and Staph lugdenesis bacteremia 2/2 abdominal infection                 Possible hardware involvement id advises suppressive therapy                          Cefadroxil 1g po bid and rifampin 300mg bid                           6/26 Communicated to ID patient's nausea resolved, tolerating diet. ID will evaluate patient and              make recommendations   Continue cefadroxil and rifampin indefinitely for suppression          DM II with HTN and CKD II                 Accucheck and SSI              Diabetic diet              Monitor renal function              BP control              Diabetes control              Diet modification     Moderate protein malnutrition, improving  Dysphagia, resolved  S/p gastrostomy                 SLP recommended regular diet              Monitor intake              GI follow-up for PEG tube removal     Hypernatremia, resolved        Debility, improving daiily                 Pt/OT              Ambulates with walker        Anemia of chronic infection  Stable  Monitor          Vertebral Hardware Infection, stable  S/p L5-S1 OLIF on 4/12 complicated by L ureteral injury  S/p ureteroureteral anastomoses and ureteral stent placement  S/p L nephrostomy 4/21                 Brace whenever out of bed              PT/OT, progressing well, ambulates in her room with walker              Follow up with neurosurgery              Follow up with urology              Good UOP out of drain and normal urination             ID rec continue suppression with Cefadroxil 1g po bid              Rifampin three times per week dosing     Hematuria, resolved  6/24 urology notified of dark blood in drainage bag  Remains afebrile no leukocytosis hemoglobin stable, urinating in toilet, if  reoccurs order UA         Rectal ulcer                  Noted on sigmoidoscopy 5/13 and healing on colonoscopy 5/24              Avoid fecal collection devices     History of Reggie upper and lower DVT              S/p ivc filter 5/29              Heparin q8h---- consider transitioning to oral anticoag                            Heparin discontinued    Past Medical History:   Diagnosis Date    Anticoagulant long-term use     Asthma     Back pain     Bradycardia, unspecified 5/8/2019    The etiology of the bradycardic episode is unclear.  The have appear to be respiratory in origin (at least the 1st episode).  We will continue to monitor carefully.  We are awaiting evaluation by Cardiology.     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, with long-term current use of insulin 1/9/2020     Past Surgical History:   Procedure Laterality Date    ANTEGRADE NEPHROSTOGRAPHY Left 12/11/2019    Procedure: Nephrostogram - antegrade;  Surgeon: Robin Boyd MD;  Location: [facility] OR [address] [place] [address];  Service: Urology;  Laterality: Left;    BRONCHOSCOPY N/A 5/2/2019    Procedure: BRONCHOSCOPY;  Surgeon: Sean Ruano MD;  Location: [facility] OR [address] [place] [address];  Service: General;  Laterality: N/A;    CARDIAC CATHETERIZATION      CATARACT EXTRACTION      cataract extraction left eye      cataracts      CAUDAL EPIDURAL STEROID INJECTION N/A 2/7/2019    Procedure: Injection-steroid-epidural-caudal;  Surgeon:  Dave Bentley Jr., MD;  Location: [facility] PAIN MGT;  Service: Pain Management;  Laterality: N/A;     SECTION, LOW TRANSVERSE      COLONOSCOPY N/A 2019    Procedure: COLONOSCOPY;  Surgeon: Al Alaniz MD;  Location: [facility] ENDO (2ND FLR);  Service: Endoscopy;  Laterality: N/A;    CORONARY ANGIOPLASTY      CYSTOGRAM N/A 2019    Procedure: CYSTOGRAM INTRAOP;  Surgeon: Robin Boyd MD;  [facility];  Service: Urology;  Laterality: N/A;  1 HOUR    CYSTOSCOPY W/ RETROGRADES Left 2019    Procedure: CYSTOSCOPY, WITH RETROGRADE PYELOGRAM;  Surgeon: Robin Boyd MD;  [facility];  Service: Urology;  Laterality: Left;  fluro    ESOPHAGOGASTRODUODENOSCOPY W/ PEG  2019    Procedure: EGD, WITH PEG TUBE INSERTION;  Surgeon: Sean Ruano MD;  [facility];  Service: General;;    EXCISION TURBINATE, SUBMUCOUS      FLEXIBLE SIGMOIDOSCOPY N/A 2019    Procedure: SIGMOIDOSCOPY, FLEXIBLE;  Surgeon: ALBERTO Amin MD;  Location: [facility] ENDO (2ND FLR);  Service: Endoscopy;  Laterality: N/A;    FLEXIBLE SIGMOIDOSCOPY N/A 2019    Procedure: SIGMOIDOSCOPY, FLEXIBLE;  Surgeon: ALBERTO Amin MD;  [facility]  Service: Endoscopy;  Laterality: N/A;    FUSION OF LUMBAR SPINE BY ANTERIOR APPROACH Left 2019    Procedure: FUSION, SPINE, LUMBAR, ANTERIOR APPROACH Left L5-S1 Anterior to Psoa Interbody Fusion, L5-S1 Posterior Instrumentation;  Surgeon: Mk George MD;  [facility];  Service: Neurosurgery;  Laterality: Left;  Porcedure:  Left L5-S1 Anterior to Psoa Interbody Fusion, L5-S1 Posterior Instrumentation  Surgery Time: 4 Hrs  LOS: 2-3  Anesthesia: General   Blood: Type & Screen  R    HAND SURGERY Left     HAND SURGERY Right     torn ligament repair/ Dr. Yeboah    HYSTERECTOMY      left foot surgery      left wrist surgery      NASAL SEPTUM SURGERY  5/7/15    PERCUTANEOUS NEPHROSTOMY Left  4/21/2019    Procedure: Creation, Nephrostomy, Percutaneous;  Surgeon: Karina Surgeon;  Location: [facility];  Service: Anesthesiology;  Laterality: Left;    REPAIR OF URETER  4/12/2019    Procedure: REPAIR, URETER;  Surgeon: Mk George MD;  [facility];  Service: Neurosurgery;;    REPLACEMENT OF NEPHROSTOMY TUBE N/A 7/18/2019    Procedure: REPLACEMENT, NEPHROSTOMY TUBE;  Surgeon: [facility] Diagnostic Provider;  [facility];  Service: Anesthesiology;  Laterality: N/A;  188    REPLACEMENT OF NEPHROSTOMY TUBE N/A 7/24/2019    Procedure: REPLACEMENT, NEPHROSTOMY TUBE;  Surgeon: [facility] Diagnostic Provider;  [facility];  Service: Anesthesiology;  Laterality: N/A;  188    REPLACEMENT OF NEPHROSTOMY TUBE N/A 10/7/2019    Procedure: REPLACEMENT, NEPHROSTOMY TUBE;  Surgeon: [facility] Diagnostic Provider;  [facility];  Service: Anesthesiology;  Laterality: N/A;  189    REPLACEMENT OF NEPHROSTOMY TUBE N/A 11/25/2019    Procedure: REPLACEMENT, NEPHROSTOMY TUBE;  Surgeon: [facility] Diagnostic Provider;  [facility];  Service: Anesthesiology;  Laterality: N/A;  Room 188/Bessy    rt elbow surgery      S/P LAD COATED STENT  05/14/2010    6 total     S/P OM1 STENT  08/2003    SINUS SURGERY      F.E.S.S.    TRACHEOSTOMY N/A 5/2/2019    Procedure: CREATION, TRACHEOSTOMY;  Surgeon: Sean Ruano MD;  Location: [address];  Service: General;  Laterality: N/A;    TUBAL LIGATION       Social History     Tobacco Use    Smoking status: Never Smoker    Smokeless tobacco: Never Used   Substance Use Topics    Alcohol use: No     Alcohol/week: 0.0 standard drinks    Drug use: No     Family History   Problem Relation Age of Onset    Diabetes Mother     Heart disease Mother     Diabetes Father     Leukemia Father         leukemia    Heart attack Father     Diabetes Sister     Diabetes Brother     Diabetes Sister     No Known Problems Sister     No Known  Problems Brother     No Known Problems Brother     No Known Problems Maternal Grandmother     No Known Problems Maternal Grandfather     No Known Problems Paternal Grandmother     No Known Problems Paternal Grandfather     No Known Problems Son     No Known Problems Son     No Known Problems Maternal Aunt     No Known Problems Maternal Uncle     No Known Problems Paternal Aunt     No Known Problems Paternal Uncle     Colon cancer Neg Hx     Inflammatory bowel disease Neg Hx     Melanoma Neg Hx     Psoriasis Neg Hx     Lupus Neg Hx     Eczema Neg Hx     Acne Neg Hx     Amblyopia Neg Hx     Blindness Neg Hx     Cancer Neg Hx     Cataracts Neg Hx     Glaucoma Neg Hx     Hypertension Neg Hx     Macular degeneration Neg Hx     Retinal detachment Neg Hx     Strabismus Neg Hx     Stroke Neg Hx     Thyroid disease Neg Hx     Heart failure Neg Hx     Hyperlipidemia Neg Hx      Allergy:  Review of patient's allergies indicates:   Allergen Reactions    Milk containing products      Causes GI distress     Outpatient Encounter Medications as of 1/21/2020   Medication Sig Dispense Refill    ACCU-CHEK EDIN PLUS METER Misc       albuterol (PROVENTIL/VENTOLIN HFA) 90 mcg/actuation inhaler Inhale 2 puffs into the lungs every 6 (six) hours as needed for Wheezing. 1 each 11    albuterol-ipratropium (DUO-NEB) 2.5 mg-0.5 mg/3 mL nebulizer solution Inhale 3 mLs into the lungs.      amLODIPine (NORVASC) 10 MG tablet 10 mg by Tube route.      amLODIPine (NORVASC) 10 MG tablet TAKE 1 TABLET BY MOUTH EVERY DAY 90 tablet 2    aspirin 81 mg Tab Take 81 mg by mouth. 1 Tablet Oral Every day      atorvastatin (LIPITOR) 80 MG tablet 1 tablet (80 mg total) by Per G Tube route once daily. 90 tablet 3    blood sugar diagnostic (ACCU-CHEK EDIN PLUS TEST STRP) Strp TEST BLOOD SUGARS 4 TIMES DAILY 150 strip 11    cefadroxil (DURICEF) 1 gram tablet TAKE 1 TABLET (1 G TOTAL) BY MOUTH 2 (TWO) TIMES DAILY.  3     clopidogrel (PLAVIX) 75 mg tablet Take 1 tablet (75 mg total) by mouth once daily. 90 tablet 3    escitalopram oxalate (LEXAPRO) 10 MG tablet Take 1 tablet (10 mg total) by mouth once daily. 30 tablet 11    famotidine (PEPCID) 20 MG tablet Take 1 tablet (20 mg total) by mouth 2 (two) times daily. 60 tablet 11    furosemide (LASIX) 40 MG tablet Take 1 tablet (40 mg total) by mouth once daily. 30 tablet 11    gabapentin (NEURONTIN) 300 MG capsule Take 1 capsule (300 mg total) by mouth 2 (two) times daily. 60 capsule 11    HYDROcodone-acetaminophen (NORCO) 5-325 mg per tablet Take 1 tablet by mouth every 6 (six) hours as needed for Pain. (Patient not taking: Reported on 1/15/2020) 28 tablet 0    insulin aspart U-100 (NOVOLOG FLEXPEN U-100 INSULIN) 100 unit/mL (3 mL) InPn pen Inject 6 Units into the skin 3 (three) times daily with meals. 6 mL 5    insulin glargine U-300 conc (TOUJEO MAX U-300 SOLOSTAR) 300 unit/mL (3 mL) InPn Inject 18 Units into the skin every evening. 6 mL 5    losartan (COZAAR) 50 MG tablet Take 1 tablet (50 mg total) by mouth once daily. 90 tablet 3    metoprolol tartrate (LOPRESSOR) 25 MG tablet Take 1 tablet (25 mg total) by mouth 2 (two) times daily. 60 tablet 11    multivitamin (THERAGRAN) per tablet Take 1 tablet by mouth once daily.      nitroGLYCERIN (NITROSTAT) 0.4 MG SL tablet Take one every 2-3 min till chestpain relief for 3 times and if still no relief, call MD or come to ED 30 tablet 11    ondansetron (ZOFRAN-ODT) 8 MG TbDL Take 1 tablet (8 mg total) by mouth every 12 (twelve) hours as needed. 30 tablet 2    oxyCODONE-acetaminophen (PERCOCET)  mg per tablet Take 1 tablet by mouth every 12 (twelve) hours as needed for Pain (For severe pain). 40 tablet 0    potassium chloride SA (K-DUR,KLOR-CON) 20 MEQ tablet Take 1 tablet (20 mEq total) by mouth 2 (two) times daily. 60 tablet 11    rifAMpin (RIFADIN) 300 MG capsule       zolpidem (AMBIEN) 5 MG Tab Take 1 tablet (5  mg total) by mouth nightly as needed. 30 tablet 2     Facility-Administered Encounter Medications as of 1/21/2020   Medication Dose Route Frequency Provider Last Rate Last Dose    fluorescein 500 mg/5 mL (10 %) injection 500 mg  5 mL Intravenous Once D. Pancho Doshi MD        lidocaine (PF) 10 mg/ml (1%) injection 10 mg  1 mL Intradermal Once Dave Bentley Jr., MD        lidocaine HCl 2% urojet   Urethral Once Robin Boyd MD         Review of Systems   ROS  Physical Exam     Vitals:    01/21/20 0947   BP: (!) 141/70   Pulse: 90     Physical Exam   Constitutional: She is oriented to person, place, and time. She appears well-developed and well-nourished. No distress.   On a wheel chair, walks with an assistance   HENT:   Head: Normocephalic and atraumatic.   Right Ear: External ear normal.   Left Ear: External ear normal.   Nose: Nose normal.   Eyes: Conjunctivae and EOM are normal. Pupils are equal, round, and reactive to light. No scleral icterus.   Neck: Normal range of motion. Neck supple. No JVD present. No tracheal deviation present. No thyromegaly present.   Cardiovascular: Normal rate, regular rhythm, normal heart sounds and intact distal pulses.  Exam reveals no gallop and no friction rub.    No murmur heard.  Pulmonary/Chest: Effort normal and breath sounds normal. No respiratory distress. She has no wheezes.   Abdominal: Soft. Bowel sounds are normal. She exhibits no distension and no mass. There is no tenderness. There is no rebound and no guarding.   Genitourinary: No vaginal discharge found.   Genitourinary Comments: Left neph tube in place.       Musculoskeletal: Normal range of motion. She exhibits no edema, tenderness or deformity.   Lymphadenopathy:     She has no cervical adenopathy.   Neurological: She is alert and oriented to person, place, and time.   Skin: Skin is warm and dry. She is not diaphoretic.     Psychiatric: She has a normal mood and affect. Her behavior is normal.  Thought content normal.       LABS:  Lab Results   Component Value Date    CREATININE 1.2 11/21/2019    CREATININE 1.0 11/13/2019    CREATININE 1.2 10/28/2019     Urine Culture, Routine   Date Value Ref Range Status   11/22/2019 No growth  Final     Radiology  CT 10/28/19  Left-sided percutaneous nephrostomy tube in place.  Moderate left-sided hydronephrosis, new when compared to prior.  Postoperative changes of left ureteral reconstruction with apparent upstream dilatation of the ureter beginning at the level of the surgical clips.    No evidence of focal intra-abdominal abscess.    Extensive atherosclerotic calcification.    Hepatomegaly.    Stable wedge-shaped hypodensities within the spleen, likely infarcts.    Assessment and Plan:  Mariann was seen today for other.    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  Her bladder can hold 500 ml without a reflux.   Cysto left RPG showed strictured left ureter at the mid-ureter portion.     Based on these findings, will plan Exploratory lap with lysis of adhesion, and Psoas hitch with left ureteral re-implant ( possible Boari flap), left ureteral stent placement and left neph tube removal.  She needs a medial clearance especially her hemoglobin A1c has risen to over 9 from 7.  Better DM control is recommended.  Stop Plavix and ASA 5 days before her surgery.  She would like to have her surgery on 2/19/20 instead of 2/26/20, in order for her to attend her sister's 60th birthday party.  Will plan H and P clinic with cath urine culture 1 wk before her surgery from left neph tube and bladder.  She will do the above blood  tests 1 day before her H and P so that we can review the labs.  Consider IMPACT Advanced Recover supplement prior to her surgery.    I spent 40 minutes with the patient of which more than half was spent in direct consultation with the patient in regards to our treatment and plan.    Follow-up:  Follow up in about 29 days (around 2/19/2020), or  Psoas hitch with left ureteral re-implant ( possible Boari flap), left ureteral stent placement and.

## 2020-02-03 ENCOUNTER — TELEPHONE (OUTPATIENT)
Dept: INTERNAL MEDICINE | Facility: CLINIC | Age: 60
End: 2020-02-03

## 2020-02-03 ENCOUNTER — OFFICE VISIT (OUTPATIENT)
Dept: INTERNAL MEDICINE | Facility: CLINIC | Age: 60
End: 2020-02-03
Payer: COMMERCIAL

## 2020-02-03 VITALS
HEIGHT: 64 IN | RESPIRATION RATE: 18 BRPM | WEIGHT: 154.56 LBS | SYSTOLIC BLOOD PRESSURE: 140 MMHG | HEART RATE: 86 BPM | DIASTOLIC BLOOD PRESSURE: 70 MMHG | BODY MASS INDEX: 26.39 KG/M2 | TEMPERATURE: 99 F

## 2020-02-03 DIAGNOSIS — I15.2 HYPERTENSION ASSOCIATED WITH DIABETES: Chronic | ICD-10-CM

## 2020-02-03 DIAGNOSIS — E78.5 HYPERLIPIDEMIA ASSOCIATED WITH TYPE 2 DIABETES MELLITUS: Chronic | ICD-10-CM

## 2020-02-03 DIAGNOSIS — J20.8 ACUTE BACTERIAL BRONCHITIS: Primary | ICD-10-CM

## 2020-02-03 DIAGNOSIS — I50.42 CHRONIC COMBINED SYSTOLIC AND DIASTOLIC CHF (CONGESTIVE HEART FAILURE): Chronic | ICD-10-CM

## 2020-02-03 DIAGNOSIS — E11.69 HYPERLIPIDEMIA ASSOCIATED WITH TYPE 2 DIABETES MELLITUS: Chronic | ICD-10-CM

## 2020-02-03 DIAGNOSIS — B96.89 ACUTE BACTERIAL BRONCHITIS: Primary | ICD-10-CM

## 2020-02-03 DIAGNOSIS — E11.59 HYPERTENSION ASSOCIATED WITH DIABETES: Chronic | ICD-10-CM

## 2020-02-03 DIAGNOSIS — E11.51 TYPE 2 DIABETES MELLITUS WITH DIABETIC PERIPHERAL ANGIOPATHY WITHOUT GANGRENE, UNSPECIFIED WHETHER LONG TERM INSULIN USE: Chronic | ICD-10-CM

## 2020-02-03 PROBLEM — T83.022A NEPHROSTOMY TUBE DISPLACED: Chronic | Status: RESOLVED | Noted: 2019-07-18 | Resolved: 2020-02-03

## 2020-02-03 PROCEDURE — 3078F PR MOST RECENT DIASTOLIC BLOOD PRESSURE < 80 MM HG: ICD-10-PCS | Mod: CPTII,S$GLB,, | Performed by: INTERNAL MEDICINE

## 2020-02-03 PROCEDURE — 3078F DIAST BP <80 MM HG: CPT | Mod: CPTII,S$GLB,, | Performed by: INTERNAL MEDICINE

## 2020-02-03 PROCEDURE — 99214 OFFICE O/P EST MOD 30 MIN: CPT | Mod: S$GLB,,, | Performed by: INTERNAL MEDICINE

## 2020-02-03 PROCEDURE — 3046F PR MOST RECENT HEMOGLOBIN A1C LEVEL > 9.0%: ICD-10-PCS | Mod: CPTII,S$GLB,, | Performed by: INTERNAL MEDICINE

## 2020-02-03 PROCEDURE — 3077F SYST BP >= 140 MM HG: CPT | Mod: CPTII,S$GLB,, | Performed by: INTERNAL MEDICINE

## 2020-02-03 PROCEDURE — 3077F PR MOST RECENT SYSTOLIC BLOOD PRESSURE >= 140 MM HG: ICD-10-PCS | Mod: CPTII,S$GLB,, | Performed by: INTERNAL MEDICINE

## 2020-02-03 PROCEDURE — 99214 PR OFFICE/OUTPT VISIT, EST, LEVL IV, 30-39 MIN: ICD-10-PCS | Mod: S$GLB,,, | Performed by: INTERNAL MEDICINE

## 2020-02-03 PROCEDURE — 3008F BODY MASS INDEX DOCD: CPT | Mod: CPTII,S$GLB,, | Performed by: INTERNAL MEDICINE

## 2020-02-03 PROCEDURE — 99999 PR PBB SHADOW E&M-EST. PATIENT-LVL III: CPT | Mod: PBBFAC,,, | Performed by: INTERNAL MEDICINE

## 2020-02-03 PROCEDURE — 99999 PR PBB SHADOW E&M-EST. PATIENT-LVL III: ICD-10-PCS | Mod: PBBFAC,,, | Performed by: INTERNAL MEDICINE

## 2020-02-03 PROCEDURE — 3046F HEMOGLOBIN A1C LEVEL >9.0%: CPT | Mod: CPTII,S$GLB,, | Performed by: INTERNAL MEDICINE

## 2020-02-03 PROCEDURE — 3008F PR BODY MASS INDEX (BMI) DOCUMENTED: ICD-10-PCS | Mod: CPTII,S$GLB,, | Performed by: INTERNAL MEDICINE

## 2020-02-03 RX ORDER — ALBUTEROL SULFATE 90 UG/1
2 AEROSOL, METERED RESPIRATORY (INHALATION) EVERY 6 HOURS PRN
Qty: 1 G | Refills: 3 | Status: ON HOLD | OUTPATIENT
Start: 2020-02-03 | End: 2023-08-23 | Stop reason: ALTCHOICE

## 2020-02-03 RX ORDER — AZITHROMYCIN 250 MG/1
TABLET, FILM COATED ORAL
Qty: 6 TABLET | Refills: 0 | Status: SHIPPED | OUTPATIENT
Start: 2020-02-03 | End: 2020-02-12

## 2020-02-03 RX ORDER — LOSARTAN POTASSIUM 100 MG/1
100 TABLET ORAL DAILY
Qty: 90 TABLET | Refills: 3 | Status: SHIPPED | OUTPATIENT
Start: 2020-02-03 | End: 2020-10-14

## 2020-02-03 NOTE — TELEPHONE ENCOUNTER
----- Message from Ayesha Galicia sent at 2/3/2020 11:26 AM CST -----  Contact: Landon granados/ LAURA   Landon called in regards to drug innerraction. Please call and advise.

## 2020-02-03 NOTE — TELEPHONE ENCOUNTER
Spoke to pharmacist, azithromycin and lexapro have high interaction. Can cause prolonged QT complex. Please advise.

## 2020-02-03 NOTE — PROGRESS NOTES
Subjective:       Patient ID: Mariann Huff is a 59 y.o. female.    Chief Complaint: URI    HPI     59-year-old female with diabetes, hypertension associated with diabetes, hyperlipidemia associated diabetes, chronic combined systolic and diastolic heart failure here for evaluation of a cough.  This has been going on since Saturday.  She reports that she has had congestion in her chest.  She has a cough productive of yellow mucus.  She has chills, but no fever.  No sinus pressure or congestion.  No SOB.  No sick contacts.  She has the surgery to reattach the ureter the 19th of this month.    HTN - Patient is currently on norvasc 10 mg, losartan 50 mg, lopressor 25 mg BID. She does not check her BP at home. Side effects of medications note: none. Denies headaches, blurred vision, chest pain, shortness of breath, nausea.    Diabetes - Tuojeo 18 units, novolog 6 units TID.  She sees endocrine and will have a glucose monitor attached to her arm that will tell endocrine what her sugars are running.  Lab Results   Component Value Date    HGBA1C 9.2 (H) 12/06/2019    HGBA1C 7.4 (H) 08/22/2019    HGBA1C 5.4 05/24/2019     Lab Results   Component Value Date    GLUF 217 (H) 09/15/2014    LDLCALC 107.6 08/22/2019    CREATININE 1.2 11/21/2019       Review of Systems    Objective:      Physical Exam   Constitutional: She is oriented to person, place, and time. She appears well-developed and well-nourished.   HENT:   Head: Normocephalic and atraumatic.   Mouth/Throat: No oropharyngeal exudate.   Eyes: Pupils are equal, round, and reactive to light. EOM are normal. Right eye exhibits no discharge. Left eye exhibits no discharge. No scleral icterus.   Neck: Normal range of motion. Neck supple. No tracheal deviation present. No thyromegaly present.   Cardiovascular: Normal rate, regular rhythm and normal heart sounds. Exam reveals no gallop and no friction rub.   No murmur heard.  Pulmonary/Chest: Effort normal and breath sounds  normal. No respiratory distress. She has no wheezes. She has no rales. She exhibits no tenderness.   Abdominal: Soft. Bowel sounds are normal. She exhibits no distension and no mass. There is no tenderness. There is no rebound and no guarding.   Musculoskeletal: Normal range of motion. She exhibits no edema or tenderness.   Neurological: She is alert and oriented to person, place, and time.   Skin: Skin is warm and dry. No rash noted. No erythema. No pallor.   Psychiatric: She has a normal mood and affect. Her behavior is normal.   Vitals reviewed.      Assessment:       1. Acute bacterial bronchitis    2. Type 2 diabetes mellitus with diabetic peripheral angiopathy without gangrene, unspecified whether long term insulin use    3. Hypertension associated with diabetes    4. Hyperlipidemia associated with type 2 diabetes mellitus    5. Chronic combined systolic and diastolic CHF (congestive heart failure)        Plan:       1/2.  Z-Reyes prescribed.  Albuterol inhaler prescribed.  Patient is seeing Endocrine in a few days.  3.  Increase losartan to 100 mg, continue amlodipine 10 mg.  Return to clinic in a month to reassess.  4.  Continue Lipitor 80 mg.  5.  Monitor.

## 2020-02-03 NOTE — TELEPHONE ENCOUNTER
Given that patient is on half maximum dose of Lexapro and that azithromycin is temporary, patient may take both together.

## 2020-02-06 ENCOUNTER — CLINICAL SUPPORT (OUTPATIENT)
Dept: ENDOCRINOLOGY | Facility: CLINIC | Age: 60
End: 2020-02-06
Payer: COMMERCIAL

## 2020-02-06 DIAGNOSIS — I12.9 TYPE 2 DIABETES MELLITUS WITH STAGE 2 CHRONIC KIDNEY DISEASE AND HYPERTENSION: ICD-10-CM

## 2020-02-06 DIAGNOSIS — E11.22 TYPE 2 DIABETES MELLITUS WITH STAGE 2 CHRONIC KIDNEY DISEASE AND HYPERTENSION: ICD-10-CM

## 2020-02-06 DIAGNOSIS — N18.2 TYPE 2 DIABETES MELLITUS WITH STAGE 2 CHRONIC KIDNEY DISEASE AND HYPERTENSION: ICD-10-CM

## 2020-02-06 NOTE — PROGRESS NOTES
"DIABETES EDUCATOR NOTE   PLACEMENT OF FREESTYLE MISTI PRO SENSOR  CONTINOUS GLUCOSE MONITORING SYSTEM (CGMS)    Patient is here in clinic today for placement of continuous glucose monitoring sensor.      Each patient verified that they were here for CGMS procedure ordered by their provider and that they have a working glucose meter and supplies at home.   Patient will be provided with a Freestyle Misti Sensor and a copy of the Continuous Glucose Monitoring Patient Log to fill out during the study.   A detailed  explanation of Continuous Glucose Monitoring was  provided. Patient informed that this is a blind procedure and that they will not actually see the blood sugar tracing in real time.  Reviewed with patient the [employer] patient education handout called "Your Freestyle Misti Pro sensor: What you need to know" to review self-care during the study to avoid sensor loosening or removal ie... Bathing, Swimming, dressing, and exercising.   Instructed patient to check blood sugar using home glucometer and to record the following on provided patient log sheets:Blood sugar taken at home, Meals and snacks, Activity, and Diabetes medications taken and dosage    Patient was brought to a private location.  Arm for insertion was selected and prepared and allowed to dry. Glucose Sensor Serial Number [id number]  was inserted to back of patient's LEFT upper arm.    The following forms  were given and reviewed in detail with patient and all questions answered.   · Continuous Glucose Monitoring Patient Log #[id number]  · Freestyle Front App Patient Handout "Your Freestyle Misti Pro Sensor: What you need to know"     Instructions held in a group: Time: 15 min   Insertion of sensor done individually in private:  Time: 5 minutes       "

## 2020-02-10 ENCOUNTER — TELEPHONE (OUTPATIENT)
Dept: INTERNAL MEDICINE | Facility: CLINIC | Age: 60
End: 2020-02-10

## 2020-02-10 ENCOUNTER — PATIENT OUTREACH (OUTPATIENT)
Dept: ADMINISTRATIVE | Facility: OTHER | Age: 60
End: 2020-02-10

## 2020-02-10 NOTE — PRE-PROCEDURE INSTRUCTIONS
Spoke to pt. Reviewed meds, will send instructions thru mail -- messaged PCP And cardiology regarding surgery and anti-coagulants

## 2020-02-10 NOTE — TELEPHONE ENCOUNTER
----- Message from Rabia Escalante RN sent at 2/10/2020  9:16 AM CST -----  Pt. Is scheduled for Exploratory lap, lysis of adhesions, re-implantation , ureter with PSOAS HITCH, cystoscopy, with ureteral stent insertion, replacement, nephrostomy tube by Dr. Boyd 2/19  Request medical clearance and instructions on plavix & ASA        Thank You  CHARLOTTE Escalante RN BC  Pre-op anesthesia

## 2020-02-11 ENCOUNTER — TELEPHONE (OUTPATIENT)
Dept: PREADMISSION TESTING | Facility: HOSPITAL | Age: 60
End: 2020-02-11

## 2020-02-11 ENCOUNTER — TELEPHONE (OUTPATIENT)
Dept: INTERNAL MEDICINE | Facility: CLINIC | Age: 60
End: 2020-02-11

## 2020-02-11 NOTE — PROGRESS NOTES
Urology ([facility]) H&P  Staff: Robin Boyd MD    CC: Left ureteral injury    HPI:  Mariann Huff is a 59 y.o. female with a left ureteral injury presenting pre-operatively for exploratory laparotomy with lysis of adhesions and left ureteral reimplant with psoas hitch.    Patient has a complex history. She underwent L5-S1 OLIF with neurosurgery on 4/12/19 and had an intraoperative left ureteral injury with ureteroureteral anastomosis and ureteral stent placement. She did well initially but re-presented shortly thereafter on 4/20/19 with sepsis and a large amount of air and fluid within the retroperitoneum and within the left collecting system. Her left ureteral anastomosis had been found to be completely broken down. She was taken of ex lap, ligation of left ureter, and left neph tube placement on 4/21/19. She had an extensive SICU stay from 4/21-5/31 complicated by inability to wean ventilator resulting in trach/PEG placement on 5/2/19 (since decannulated and PEG removed). She had an episode of HAP and multiple infections in blood and urine requiring multiple antibiotics, bilateral upper extremity DVT's, RLE DVT (s/p IVC filter 5/29/19), and bleeding rectal ulcer after anticoagulation for DVT's requiring pRBC transfusions. She was readmitted to MICU from 6/6-6/12 due to respiratory distress and was discovered to have an NSTEMI from acute blood loss anemia. 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     Her left ureteral injury is currently managed via left nephrostomy tube most recently exchanged on 11/25/19. She underwent cystoscopy with left retrograde pyelogram, left antegrade nephrostogram, and cystosgram on 12/11/19. Left RPG showed patent ureter up to pelvic brim almost at the L5 level. Antegrade nephrostogram showed patent  ureter down to level of L5. Her bladder capacity was 500 cc with normal contour on cystogram.    She has a history of type 2 DM (A1c 9.2), MURTAZA (not on CPAP), HTN, HLD, HFpEF, CAD (LAD GINGER proximal and distal 2013 and GINGER ostial LAD 2/2014), prior NSTEMI, carotid stenosis, and asthma. No dyspnea or angina on exertion.  Besides listed above, no prior abdominal surgeries.    ROS:  Neg except per HPI    Past Medical History:   Diagnosis Date    Anticoagulant long-term use     Asthma     Back pain     Bradycardia, unspecified 5/8/2019    The etiology of the bradycardic episode is unclear.  The have appear to be respiratory in origin (at least the 1st episode).  We will continue to monitor carefully.  We are awaiting evaluation by Cardiology.     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, with long-term current use of insulin 1/9/2020       Past Surgical History:   Procedure Laterality Date    ANTEGRADE NEPHROSTOGRAPHY Left 12/11/2019    Procedure: Nephrostogram - antegrade;  Surgeon: Robin Boyd MD;  Location: [facility] OR [address] [place] [address];  Service: Urology;  Laterality: Left;    BRONCHOSCOPY N/A 5/2/2019    Procedure: BRONCHOSCOPY;  Surgeon: Sean Ruano MD;  Location: [facility] OR [address] [place] [address];  Service: General;  Laterality: N/A;    CARDIAC CATHETERIZATION      CATARACT EXTRACTION      cataract extraction left eye      cataracts      CAUDAL EPIDURAL STEROID INJECTION N/A 2/7/2019     Procedure: Injection-steroid-epidural-caudal;  Surgeon: Dave Bentley Jr., MD;  Location: [facility] PAIN MGT;  Service: Pain Management;  Laterality: N/A;     SECTION, LOW TRANSVERSE      COLONOSCOPY N/A 2019    Procedure: COLONOSCOPY;  Surgeon: Al Alaniz MD;  Location: [facility] ENDO (2ND FLR);  Service: Endoscopy;  Laterality: N/A;    CORONARY ANGIOPLASTY      CYSTOGRAM N/A 2019    Procedure: CYSTOGRAM INTRAOP;  Surgeon: Robin Boyd MD;  [facility];  Service: Urology;  Laterality: N/A;  1 HOUR    CYSTOSCOPY W/ RETROGRADES Left 2019    Procedure: CYSTOSCOPY, WITH RETROGRADE PYELOGRAM;  Surgeon: Robin Boyd MD;  [facility];  Service: Urology;  Laterality: Left;  fluro    ESOPHAGOGASTRODUODENOSCOPY W/ PEG  2019    Procedure: EGD, WITH PEG TUBE INSERTION;  Surgeon: Sean Ruano MD;  [address]  Service: General;;    EXCISION TURBINATE, SUBMUCOUS      FLEXIBLE SIGMOIDOSCOPY N/A 2019    Procedure: SIGMOIDOSCOPY, FLEXIBLE;  Surgeon: ALBERTO Amin MD;  [facility]  Service: Endoscopy;  Laterality: N/A;    FLEXIBLE SIGMOIDOSCOPY N/A 2019    Procedure: SIGMOIDOSCOPY, FLEXIBLE;  Surgeon: ALBERTO Amin MD;  [facility]  Service: Endoscopy;  Laterality: N/A;    FUSION OF LUMBAR SPINE BY ANTERIOR APPROACH Left 2019    Procedure: FUSION, SPINE, LUMBAR, ANTERIOR APPROACH Left L5-S1 Anterior to Psoa Interbody Fusion, L5-S1 Posterior Instrumentation;  Surgeon: Mk George MD;  [facility];  Service: Neurosurgery;  Laterality: Left;  Porcedure:  Left L5-S1 Anterior to Psoa Interbody Fusion, L5-S1 Posterior Instrumentation  Surgery Time: 4 Hrs  LOS: 2-3  Anesthesia: General   Blood: Type & Screen  R    HAND SURGERY Left     HAND SURGERY Right     torn ligament repair/ Dr. Yeboah    HYSTERECTOMY      left foot surgery      left wrist surgery      NASAL  SEPTUM SURGERY  5/7/15    PERCUTANEOUS NEPHROSTOMY Left 4/21/2019    Procedure: Creation, Nephrostomy, Percutaneous;  Surgeon: Karina Surgeon;  Location: [facility];  Service: Anesthesiology;  Laterality: Left;    REPAIR OF URETER  4/12/2019    Procedure: REPAIR, URETER;  Surgeon: Mk George MD;  [facility];  Service: Neurosurgery;;    REPLACEMENT OF NEPHROSTOMY TUBE N/A 7/18/2019    Procedure: REPLACEMENT, NEPHROSTOMY TUBE;  Surgeon: [facility] Diagnostic Provider;  Location: [facility] OR [facility] F[facility];  Service: Anesthesiology;  Laterality: N/A;  188    REPLACEMENT OF NEPHROSTOMY TUBE N/A 7/24/2019    Procedure: REPLACEMENT, NEPHROSTOMY TUBE;  Surgeon: [facility] Diagnostic Provider;  [facility];  Service: Anesthesiology;  Laterality: N/A;  188    REPLACEMENT OF NEPHROSTOMY TUBE N/A 10/7/2019    Procedure: REPLACEMENT, NEPHROSTOMY TUBE;  Surgeon: [facility] Diagnostic Provider;  [facility];  Service: Anesthesiology;  Laterality: N/A;  189    REPLACEMENT OF NEPHROSTOMY TUBE N/A 11/25/2019    Procedure: REPLACEMENT, NEPHROSTOMY TUBE;  Surgeon: [facility] Diagnostic Provider;  [facility];  Service: Anesthesiology;  Laterality: N/A;  Room 188/Bessy    rt elbow surgery      S/P LAD COATED STENT  05/14/2010    6 total     S/P OM1 STENT  08/2003    SINUS SURGERY      F[place]S.    TRACHEOSTOMY N/A 5/2/2019    Procedure: CREATION, TRACHEOSTOMY;  Surgeon: Sean Ruano MD;  [facility];  Service: General;  Laterality: N/A;    TUBAL LIGATION         Social History     Socioeconomic History    Marital status:      Spouse name: Shamir    Number of children: 2    Years of education: Not on file    Highest education level: Not on file   Occupational History    Occupation: cafeteria     Employer: [email or web address]     Employer: [employer]     Employer: [employer]   Social Needs    Financial resource strain: Not on file    Food  insecurity:     Worry: Not on file     Inability: Not on file    Transportation needs:     Medical: Not on file     Non-medical: Not on file   Tobacco Use    Smoking status: Never Smoker    Smokeless tobacco: Never Used   Substance and Sexual Activity    Alcohol use: No     Alcohol/week: 0.0 standard drinks    Drug use: No    Sexual activity: Yes     Partners: Male     Birth control/protection: Post-menopausal     Comment:    Lifestyle    Physical activity:     Days per week: Not on file     Minutes per session: Not on file    Stress: Not on file   Relationships    Social connections:     Talks on phone: Not on file     Gets together: Not on file     Attends Hindu service: Not on file     Active member of club or organization: Not on file     Attends meetings of clubs or organizations: Not on file     Relationship status: Not on file   Other Topics Concern    Are you pregnant or think you may be? No    Breast-feeding No   Social History Narrative    . 2 children.        Family History   Problem Relation Age of Onset    Diabetes Mother     Heart disease Mother     Diabetes Father     Leukemia Father         leukemia    Heart attack Father     Diabetes Sister     Diabetes Brother     Diabetes Sister     No Known Problems Sister     No Known Problems Brother     No Known Problems Brother     No Known Problems Maternal Grandmother     No Known Problems Maternal Grandfather     No Known Problems Paternal Grandmother     No Known Problems Paternal Grandfather     No Known Problems Son     No Known Problems Son     No Known Problems Maternal Aunt     No Known Problems Maternal Uncle     No Known Problems Paternal Aunt     No Known Problems Paternal Uncle     Colon cancer Neg Hx     Inflammatory bowel disease Neg Hx     Melanoma Neg Hx     Psoriasis Neg Hx     Lupus Neg Hx     Eczema Neg Hx     Acne Neg Hx     Amblyopia Neg Hx     Blindness Neg Hx     Cancer Neg Hx      Cataracts Neg Hx     Glaucoma Neg Hx     Hypertension Neg Hx     Macular degeneration Neg Hx     Retinal detachment Neg Hx     Strabismus Neg Hx     Stroke Neg Hx     Thyroid disease Neg Hx     Heart failure Neg Hx     Hyperlipidemia Neg Hx        Review of patient's allergies indicates:   Allergen Reactions    Milk containing products      Causes GI distress       Current Outpatient Medications on File Prior to Visit   Medication Sig Dispense Refill    ACCU-CHEK EDIN PLUS METER Misc       albuterol (PROVENTIL/VENTOLIN HFA) 90 mcg/actuation inhaler Inhale 2 puffs into the lungs every 6 (six) hours as needed for Wheezing. 1 g 3    albuterol-ipratropium (DUO-NEB) 2.5 mg-0.5 mg/3 mL nebulizer solution Inhale 3 mLs into the lungs.      amLODIPine (NORVASC) 10 MG tablet 10 mg by Tube route.      amLODIPine (NORVASC) 10 MG tablet TAKE 1 TABLET BY MOUTH EVERY DAY 90 tablet 2    aspirin 81 mg Tab Take 81 mg by mouth. 1 Tablet Oral Every day      atorvastatin (LIPITOR) 80 MG tablet 1 tablet (80 mg total) by Per G Tube route once daily. 90 tablet 3    azithromycin (ZITHROMAX Z-PHONG) 250 MG tablet As directed 6 tablet 0    blood sugar diagnostic (ACCU-CHEK EDIN PLUS TEST STRP) Strp TEST BLOOD SUGARS 4 TIMES DAILY 150 strip 11    cefadroxil (DURICEF) 1 gram tablet TAKE 1 TABLET (1 G TOTAL) BY MOUTH 2 (TWO) TIMES DAILY.  3    clopidogrel (PLAVIX) 75 mg tablet Take 1 tablet (75 mg total) by mouth once daily. 90 tablet 3    escitalopram oxalate (LEXAPRO) 10 MG tablet Take 1 tablet (10 mg total) by mouth once daily. 30 tablet 11    famotidine (PEPCID) 20 MG tablet Take 1 tablet (20 mg total) by mouth 2 (two) times daily. 60 tablet 11    furosemide (LASIX) 40 MG tablet Take 1 tablet (40 mg total) by mouth once daily. 30 tablet 11    gabapentin (NEURONTIN) 300 MG capsule Take 1 capsule (300 mg total) by mouth 2 (two) times daily. 60 capsule 11    HYDROcodone-acetaminophen (NORCO) 5-325 mg per tablet  "Take 1 tablet by mouth every 6 (six) hours as needed for Pain. 28 tablet 0    insulin aspart U-100 (NOVOLOG FLEXPEN U-100 INSULIN) 100 unit/mL (3 mL) InPn pen Inject 6 Units into the skin 3 (three) times daily with meals. 6 mL 5    insulin glargine U-300 conc (TOUJEO MAX U-300 SOLOSTAR) 300 unit/mL (3 mL) InPn Inject 18 Units into the skin every evening. 6 mL 5    losartan (COZAAR) 100 MG tablet Take 1 tablet (100 mg total) by mouth once daily. 90 tablet 3    metoprolol tartrate (LOPRESSOR) 25 MG tablet Take 1 tablet (25 mg total) by mouth 2 (two) times daily. 60 tablet 11    multivitamin (THERAGRAN) per tablet Take 1 tablet by mouth once daily.      nitroGLYCERIN (NITROSTAT) 0.4 MG SL tablet Take one every 2-3 min till chestpain relief for 3 times and if still no relief, call MD or come to ED 30 tablet 11    ondansetron (ZOFRAN-ODT) 8 MG TbDL Take 1 tablet (8 mg total) by mouth every 12 (twelve) hours as needed. 30 tablet 2    oxyCODONE-acetaminophen (PERCOCET)  mg per tablet Take 1 tablet by mouth every 12 (twelve) hours as needed for Pain (For severe pain). 40 tablet 0    pen needle, diabetic (NOVOTWIST) 32 gauge x 1/5" Ndle Use 1 needle to inject insulin four times daily 400 each 3    potassium chloride SA (K-DUR,KLOR-CON) 20 MEQ tablet Take 1 tablet (20 mEq total) by mouth 2 (two) times daily. 60 tablet 11    rifAMpin (RIFADIN) 300 MG capsule       zolpidem (AMBIEN) 5 MG Tab Take 1 tablet (5 mg total) by mouth nightly as needed. 30 tablet 2     Current Facility-Administered Medications on File Prior to Visit   Medication Dose Route Frequency Provider Last Rate Last Dose    fluorescein 500 mg/5 mL (10 %) injection 500 mg  5 mL Intravenous Once D. Pancho Doshi MD        lidocaine (PF) 10 mg/ml (1%) injection 10 mg  1 mL Intradermal Once Dave Bentley Jr., MD        lidocaine HCl 2% urojet   Urethral Once Robin Boyd MD           Anticoagulation:  Yes - ASA 81 / Plavix    Physical " Exam:  General: No acute distress, well developed. AAOx3  Head: Normocephalic, Atraumatic  Eyes: Extra-occular movements intact, No discharge  Neck: supple, symmetrical, trachea midline  Lungs: normal respiratory effort, no respiratory distress, no wheezes  CV: regular rate, 2+ pulses  Abdomen: soft, non-tender, non-distended, no organomegaly; lower midline scar well-healed; left NT in place draining clear medium red urine  MSK: no edema, no deformities, normal ROM  Skin: skin color, texture, turgor normal.  Neurologic: no focal deficits, sensation intact    Labs:    Urine dipstick today from clean catch - Positive for 50 of blood. Negative for nitrites, leukocytes, and all other tested components.    Urine dipstick today from left nephrostomy tube - Positive for 250 of blood and 2+ leuks. Negative for nitrites and all other tested components.    Lab Results   Component Value Date    WBC 11.63 10/28/2019    HGB 11.8 (L) 10/28/2019    HCT 37.2 10/28/2019    MCV 83 10/28/2019     10/28/2019           BMP  Lab Results   Component Value Date     11/21/2019    K 5.1 11/21/2019     11/21/2019    CO2 24 11/21/2019    BUN 33 (H) 11/21/2019    CREATININE 1.2 11/21/2019    CALCIUM 10.4 11/21/2019    ANIONGAP 14 11/21/2019    ESTGFRAFRICA 57.6 (A) 11/21/2019    EGFRNONAA 49.9 (A) 11/21/2019       Imaging:  See HPI    Assessment: Mariann Huff is a 59 y.o. female with a left ureteral injury presenting pre-operatively for exploratory laparotomy with lysis of adhesions and left ureteral reimplant with psoas hitch.    Plan:     1. To OR on 2/19/20 for exploratory laparotomy with lysis of adhesions and left ureteral reimplant with psoas hitch vs. Ureteroureterostomy.  2. Consents signed   3. I have explained the risk, benefits, and alternatives of the procedure in detail. The patient voices understanding and all questions have been answered. The patient agrees to proceed as planned.   4. Given complex history,  will follow-up appointments today including internal medicine and cardiology. Will need instructions regarding ASA 81/Plavix.  5. Follow-up pre-op labs. Added on hemoglobin A1c to labs scheduled per anesthesia for today.  6. Clean catch urine and left nephrostomy urine sent for culture    Christian Hyatt MD

## 2020-02-11 NOTE — H&P (VIEW-ONLY)
Urology ([facility]) H&P  Staff: Robin Boyd MD    CC: Left ureteral injury    HPI:  Mariann Huff is a 59 y.o. female with a left ureteral injury presenting pre-operatively for exploratory laparotomy with lysis of adhesions and left ureteral reimplant with psoas hitch.    Patient has a complex history. She underwent L5-S1 OLIF with neurosurgery on 4/12/19 and had an intraoperative left ureteral injury with ureteroureteral anastomosis and ureteral stent placement. She did well initially but re-presented shortly thereafter on 4/20/19 with sepsis and a large amount of air and fluid within the retroperitoneum and within the left collecting system. Her left ureteral anastomosis had been found to be completely broken down. She was taken of ex lap, ligation of left ureter, and left neph tube placement on 4/21/19. She had an extensive SICU stay from 4/21-5/31 complicated by inability to wean ventilator resulting in trach/PEG placement on 5/2/19 (since decannulated and PEG removed). She had an episode of HAP and multiple infections in blood and urine requiring multiple antibiotics, bilateral upper extremity DVT's, RLE DVT (s/p IVC filter 5/29/19), and bleeding rectal ulcer after anticoagulation for DVT's requiring pRBC transfusions. She was readmitted to MICU from 6/6-6/12 due to respiratory distress and was discovered to have an NSTEMI from acute blood loss anemia. 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     Her left ureteral injury is currently managed via left nephrostomy tube most recently exchanged on 11/25/19. She underwent cystoscopy with left retrograde pyelogram, left antegrade nephrostogram, and cystosgram on 12/11/19. Left RPG showed patent ureter up to pelvic brim almost at the L5 level. Antegrade nephrostogram showed patent  ureter down to level of L5. Her bladder capacity was 500 cc with normal contour on cystogram.    She has a history of type 2 DM (A1c 9.2), MURTAZA (not on CPAP), HTN, HLD, HFpEF, CAD (LAD GINGER proximal and distal 2013 and GINGER ostial LAD 2/2014), prior NSTEMI, carotid stenosis, and asthma. No dyspnea or angina on exertion.  Besides listed above, no prior abdominal surgeries.    ROS:  Neg except per HPI    Past Medical History:   Diagnosis Date    Anticoagulant long-term use     Asthma     Back pain     Bradycardia, unspecified 5/8/2019    The etiology of the bradycardic episode is unclear.  The have appear to be respiratory in origin (at least the 1st episode).  We will continue to monitor carefully.  We are awaiting evaluation by Cardiology.     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, with long-term current use of insulin 1/9/2020       Past Surgical History:   Procedure Laterality Date    ANTEGRADE NEPHROSTOGRAPHY Left 12/11/2019    Procedure: Nephrostogram - antegrade;  Surgeon: Robin Boyd MD;  Location: [facility] OR [address] [place] [address];  Service: Urology;  Laterality: Left;    BRONCHOSCOPY N/A 5/2/2019    Procedure: BRONCHOSCOPY;  Surgeon: Sean Ruano MD;  Location: [facility] OR [address] [place] [address];  Service: General;  Laterality: N/A;    CARDIAC CATHETERIZATION      CATARACT EXTRACTION      cataract extraction left eye      cataracts      CAUDAL EPIDURAL STEROID INJECTION N/A 2/7/2019     Procedure: Injection-steroid-epidural-caudal;  Surgeon: Dave Bentley Jr., MD;  Location: [facility] PAIN MGT;  Service: Pain Management;  Laterality: N/A;     SECTION, LOW TRANSVERSE      COLONOSCOPY N/A 2019    Procedure: COLONOSCOPY;  Surgeon: Al Alaniz MD;  Location: [facility] ENDO (2ND FLR);  Service: Endoscopy;  Laterality: N/A;    CORONARY ANGIOPLASTY      CYSTOGRAM N/A 2019    Procedure: CYSTOGRAM INTRAOP;  Surgeon: Robin Boyd MD;  [facility];  Service: Urology;  Laterality: N/A;  1 HOUR    CYSTOSCOPY W/ RETROGRADES Left 2019    Procedure: CYSTOSCOPY, WITH RETROGRADE PYELOGRAM;  Surgeon: Robin Boyd MD;  [facility];  Service: Urology;  Laterality: Left;  fluro    ESOPHAGOGASTRODUODENOSCOPY W/ PEG  2019    Procedure: EGD, WITH PEG TUBE INSERTION;  Surgeon: Sean Ruano MD;  [address]  Service: General;;    EXCISION TURBINATE, SUBMUCOUS      FLEXIBLE SIGMOIDOSCOPY N/A 2019    Procedure: SIGMOIDOSCOPY, FLEXIBLE;  Surgeon: ALBERTO Amin MD;  [facility]  Service: Endoscopy;  Laterality: N/A;    FLEXIBLE SIGMOIDOSCOPY N/A 2019    Procedure: SIGMOIDOSCOPY, FLEXIBLE;  Surgeon: ALBERTO Amin MD;  [facility]  Service: Endoscopy;  Laterality: N/A;    FUSION OF LUMBAR SPINE BY ANTERIOR APPROACH Left 2019    Procedure: FUSION, SPINE, LUMBAR, ANTERIOR APPROACH Left L5-S1 Anterior to Psoa Interbody Fusion, L5-S1 Posterior Instrumentation;  Surgeon: Mk George MD;  [facility];  Service: Neurosurgery;  Laterality: Left;  Porcedure:  Left L5-S1 Anterior to Psoa Interbody Fusion, L5-S1 Posterior Instrumentation  Surgery Time: 4 Hrs  LOS: 2-3  Anesthesia: General   Blood: Type & Screen  R    HAND SURGERY Left     HAND SURGERY Right     torn ligament repair/ Dr. Yeboah    HYSTERECTOMY      left foot surgery      left wrist surgery      NASAL  SEPTUM SURGERY  5/7/15    PERCUTANEOUS NEPHROSTOMY Left 4/21/2019    Procedure: Creation, Nephrostomy, Percutaneous;  Surgeon: Karina Surgeon;  Location: [facility];  Service: Anesthesiology;  Laterality: Left;    REPAIR OF URETER  4/12/2019    Procedure: REPAIR, URETER;  Surgeon: Mk George MD;  [facility];  Service: Neurosurgery;;    REPLACEMENT OF NEPHROSTOMY TUBE N/A 7/18/2019    Procedure: REPLACEMENT, NEPHROSTOMY TUBE;  Surgeon: [facility] Diagnostic Provider;  Location[facility] OR [facility] FLR;  Service: Anesthesiology;  Laterality: N/A;  188    REPLACEMENT OF NEPHROSTOMY TUBE N/A 7/24/2019    Procedure: REPLACEMENT, NEPHROSTOMY TUBE;  Surgeon: [facility] Diagnostic Provider;  [facility];  Service: Anesthesiology;  Laterality: N/A;  188    REPLACEMENT OF NEPHROSTOMY TUBE N/A 10/7/2019    Procedure: REPLACEMENT, NEPHROSTOMY TUBE;  Surgeon: [facility] Diagnostic Provider;  [facility];  Service: Anesthesiology;  Laterality: N/A;  189    REPLACEMENT OF NEPHROSTOMY TUBE N/A 11/25/2019    Procedure: REPLACEMENT, NEPHROSTOMY TUBE;  Surgeon: [facility] Diagnostic Provider;  [facility];  Service: Anesthesiology;  Laterality: N/A;  Room 188/Bessy    rt elbow surgery      S/P LAD COATED STENT  05/14/2010    6 total     S/P OM1 STENT  08/2003    SINUS SURGERY      F.E.S.S.    TRACHEOSTOMY N/A 5/2/2019    Procedure: CREATION, TRACHEOSTOMY;  Surgeon: Sean Ruano MD;  [facility];  Service: General;  Laterality: N/A;    TUBAL LIGATION         Social History     Socioeconomic History    Marital status:      Spouse name: Shamir    Number of children: 2    Years of education: Not on file    Highest education level: Not on file   Occupational History    Occupation: cafeteria     Employer: [employer]     Employer: [employer]     Employer: [employer]   Social Needs    Financial resource strain: Not on file    Food  insecurity:     Worry: Not on file     Inability: Not on file    Transportation needs:     Medical: Not on file     Non-medical: Not on file   Tobacco Use    Smoking status: Never Smoker    Smokeless tobacco: Never Used   Substance and Sexual Activity    Alcohol use: No     Alcohol/week: 0.0 standard drinks    Drug use: No    Sexual activity: Yes     Partners: Male     Birth control/protection: Post-menopausal     Comment:    Lifestyle    Physical activity:     Days per week: Not on file     Minutes per session: Not on file    Stress: Not on file   Relationships    Social connections:     Talks on phone: Not on file     Gets together: Not on file     Attends [employer] service: Not on file     Active member of club or organization: Not on file     Attends meetings of clubs or organizations: Not on file     Relationship status: Not on file   Other Topics Concern    Are you pregnant or think you may be? No    Breast-feeding No   Social History Narrative    . 2 children.        Family History   Problem Relation Age of Onset    Diabetes Mother     Heart disease Mother     Diabetes Father     Leukemia Father         leukemia    Heart attack Father     Diabetes Sister     Diabetes Brother     Diabetes Sister     No Known Problems Sister     No Known Problems Brother     No Known Problems Brother     No Known Problems Maternal Grandmother     No Known Problems Maternal Grandfather     No Known Problems Paternal Grandmother     No Known Problems Paternal Grandfather     No Known Problems Son     No Known Problems Son     No Known Problems Maternal Aunt     No Known Problems Maternal Uncle     No Known Problems Paternal Aunt     No Known Problems Paternal Uncle     Colon cancer Neg Hx     Inflammatory bowel disease Neg Hx     Melanoma Neg Hx     Psoriasis Neg Hx     Lupus Neg Hx     Eczema Neg Hx     Acne Neg Hx     Amblyopia Neg Hx     Blindness Neg Hx     Cancer Neg Hx      Cataracts Neg Hx     Glaucoma Neg Hx     Hypertension Neg Hx     Macular degeneration Neg Hx     Retinal detachment Neg Hx     Strabismus Neg Hx     Stroke Neg Hx     Thyroid disease Neg Hx     Heart failure Neg Hx     Hyperlipidemia Neg Hx        Review of patient's allergies indicates:   Allergen Reactions    Milk containing products      Causes GI distress       Current Outpatient Medications on File Prior to Visit   Medication Sig Dispense Refill    ACCU-CHEK EDIN PLUS METER Misc       albuterol (PROVENTIL/VENTOLIN HFA) 90 mcg/actuation inhaler Inhale 2 puffs into the lungs every 6 (six) hours as needed for Wheezing. 1 g 3    albuterol-ipratropium (DUO-NEB) 2.5 mg-0.5 mg/3 mL nebulizer solution Inhale 3 mLs into the lungs.      amLODIPine (NORVASC) 10 MG tablet 10 mg by Tube route.      amLODIPine (NORVASC) 10 MG tablet TAKE 1 TABLET BY MOUTH EVERY DAY 90 tablet 2    aspirin 81 mg Tab Take 81 mg by mouth. 1 Tablet Oral Every day      atorvastatin (LIPITOR) 80 MG tablet 1 tablet (80 mg total) by Per G Tube route once daily. 90 tablet 3    azithromycin (ZITHROMAX Z-PHONG) 250 MG tablet As directed 6 tablet 0    blood sugar diagnostic (ACCU-CHEK EDIN PLUS TEST STRP) Strp TEST BLOOD SUGARS 4 TIMES DAILY 150 strip 11    cefadroxil (DURICEF) 1 gram tablet TAKE 1 TABLET (1 G TOTAL) BY MOUTH 2 (TWO) TIMES DAILY.  3    clopidogrel (PLAVIX) 75 mg tablet Take 1 tablet (75 mg total) by mouth once daily. 90 tablet 3    escitalopram oxalate (LEXAPRO) 10 MG tablet Take 1 tablet (10 mg total) by mouth once daily. 30 tablet 11    famotidine (PEPCID) 20 MG tablet Take 1 tablet (20 mg total) by mouth 2 (two) times daily. 60 tablet 11    furosemide (LASIX) 40 MG tablet Take 1 tablet (40 mg total) by mouth once daily. 30 tablet 11    gabapentin (NEURONTIN) 300 MG capsule Take 1 capsule (300 mg total) by mouth 2 (two) times daily. 60 capsule 11    HYDROcodone-acetaminophen (NORCO) 5-325 mg per tablet  "Take 1 tablet by mouth every 6 (six) hours as needed for Pain. 28 tablet 0    insulin aspart U-100 (NOVOLOG FLEXPEN U-100 INSULIN) 100 unit/mL (3 mL) InPn pen Inject 6 Units into the skin 3 (three) times daily with meals. 6 mL 5    insulin glargine U-300 conc (TOUJEO MAX U-300 SOLOSTAR) 300 unit/mL (3 mL) InPn Inject 18 Units into the skin every evening. 6 mL 5    losartan (COZAAR) 100 MG tablet Take 1 tablet (100 mg total) by mouth once daily. 90 tablet 3    metoprolol tartrate (LOPRESSOR) 25 MG tablet Take 1 tablet (25 mg total) by mouth 2 (two) times daily. 60 tablet 11    multivitamin (THERAGRAN) per tablet Take 1 tablet by mouth once daily.      nitroGLYCERIN (NITROSTAT) 0.4 MG SL tablet Take one every 2-3 min till chestpain relief for 3 times and if still no relief, call MD or come to ED 30 tablet 11    ondansetron (ZOFRAN-ODT) 8 MG TbDL Take 1 tablet (8 mg total) by mouth every 12 (twelve) hours as needed. 30 tablet 2    oxyCODONE-acetaminophen (PERCOCET)  mg per tablet Take 1 tablet by mouth every 12 (twelve) hours as needed for Pain (For severe pain). 40 tablet 0    pen needle, diabetic (NOVOTWIST) 32 gauge x 1/5" Ndle Use 1 needle to inject insulin four times daily 400 each 3    potassium chloride SA (K-DUR,KLOR-CON) 20 MEQ tablet Take 1 tablet (20 mEq total) by mouth 2 (two) times daily. 60 tablet 11    rifAMpin (RIFADIN) 300 MG capsule       zolpidem (AMBIEN) 5 MG Tab Take 1 tablet (5 mg total) by mouth nightly as needed. 30 tablet 2     Current Facility-Administered Medications on File Prior to Visit   Medication Dose Route Frequency Provider Last Rate Last Dose    fluorescein 500 mg/5 mL (10 %) injection 500 mg  5 mL Intravenous Once D. Pancho Doshi MD        lidocaine (PF) 10 mg/ml (1%) injection 10 mg  1 mL Intradermal Once Dave Bentley Jr., MD        lidocaine HCl 2% urojet   Urethral Once Robin Boyd MD           Anticoagulation:  Yes - ASA 81 / Plavix    Physical " Exam:  General: No acute distress, well developed. AAOx3  Head: Normocephalic, Atraumatic  Eyes: Extra-occular movements intact, No discharge  Neck: supple, symmetrical, trachea midline  Lungs: normal respiratory effort, no respiratory distress, no wheezes  CV: regular rate, 2+ pulses  Abdomen: soft, non-tender, non-distended, no organomegaly; lower midline scar well-healed; left NT in place draining clear medium red urine  MSK: no edema, no deformities, normal ROM  Skin: skin color, texture, turgor normal.  Neurologic: no focal deficits, sensation intact    Labs:    Urine dipstick today from clean catch - Positive for 50 of blood. Negative for nitrites, leukocytes, and all other tested components.    Urine dipstick today from left nephrostomy tube - Positive for 250 of blood and 2+ leuks. Negative for nitrites and all other tested components.    Lab Results   Component Value Date    WBC 11.63 10/28/2019    HGB 11.8 (L) 10/28/2019    HCT 37.2 10/28/2019    MCV 83 10/28/2019     10/28/2019           BMP  Lab Results   Component Value Date     11/21/2019    K 5.1 11/21/2019     11/21/2019    CO2 24 11/21/2019    BUN 33 (H) 11/21/2019    CREATININE 1.2 11/21/2019    CALCIUM 10.4 11/21/2019    ANIONGAP 14 11/21/2019    ESTGFRAFRICA 57.6 (A) 11/21/2019    EGFRNONAA 49.9 (A) 11/21/2019       Imaging:  See HPI    Assessment: Mariann Huff is a 59 y.o. female with a left ureteral injury presenting pre-operatively for exploratory laparotomy with lysis of adhesions and left ureteral reimplant with psoas hitch.    Plan:     1. To OR on 2/19/20 for exploratory laparotomy with lysis of adhesions and left ureteral reimplant with psoas hitch vs. Ureteroureterostomy.  2. Consents signed   3. I have explained the risk, benefits, and alternatives of the procedure in detail. The patient voices understanding and all questions have been answered. The patient agrees to proceed as planned.   4. Given complex history,  will follow-up appointments today including internal medicine and cardiology. Will need instructions regarding ASA 81/Plavix.  5. Follow-up pre-op labs. Added on hemoglobin A1c to labs scheduled per anesthesia for today.  6. Clean catch urine and left nephrostomy urine sent for culture    Christian Hyatt MD

## 2020-02-11 NOTE — TELEPHONE ENCOUNTER
----- Message from Carmen Baker sent at 2/11/2020 10:34 AM CST -----  Contact: self [phone]  Patient is calling to request if she can be seen for a pre op tomorrow, she's having her nephrostomy bag removed on 2/19 and has an apt with the surgeon tomorrow and doesn't want to make 2 trips, please advise    thanks

## 2020-02-12 ENCOUNTER — LAB VISIT (OUTPATIENT)
Dept: LAB | Facility: HOSPITAL | Age: 60
End: 2020-02-12
Attending: ANESTHESIOLOGY
Payer: COMMERCIAL

## 2020-02-12 ENCOUNTER — OFFICE VISIT (OUTPATIENT)
Dept: UROLOGY | Facility: CLINIC | Age: 60
End: 2020-02-12
Payer: COMMERCIAL

## 2020-02-12 ENCOUNTER — CLINICAL SUPPORT (OUTPATIENT)
Dept: ENDOCRINOLOGY | Facility: CLINIC | Age: 60
End: 2020-02-12
Payer: COMMERCIAL

## 2020-02-12 ENCOUNTER — OFFICE VISIT (OUTPATIENT)
Dept: CARDIOLOGY | Facility: CLINIC | Age: 60
End: 2020-02-12
Payer: COMMERCIAL

## 2020-02-12 ENCOUNTER — OFFICE VISIT (OUTPATIENT)
Dept: INTERNAL MEDICINE | Facility: CLINIC | Age: 60
End: 2020-02-12
Payer: COMMERCIAL

## 2020-02-12 VITALS
HEIGHT: 64 IN | DIASTOLIC BLOOD PRESSURE: 80 MMHG | SYSTOLIC BLOOD PRESSURE: 138 MMHG | HEART RATE: 80 BPM | TEMPERATURE: 98 F | WEIGHT: 156.06 LBS | BODY MASS INDEX: 26.64 KG/M2

## 2020-02-12 VITALS
DIASTOLIC BLOOD PRESSURE: 79 MMHG | SYSTOLIC BLOOD PRESSURE: 187 MMHG | HEIGHT: 64 IN | HEART RATE: 77 BPM | BODY MASS INDEX: 26.4 KG/M2 | WEIGHT: 154.63 LBS

## 2020-02-12 DIAGNOSIS — E11.69 HYPERLIPIDEMIA ASSOCIATED WITH TYPE 2 DIABETES MELLITUS: Chronic | ICD-10-CM

## 2020-02-12 DIAGNOSIS — I50.30 HEART FAILURE WITH PRESERVED EJECTION FRACTION, UNSPECIFIED HF CHRONICITY: Chronic | ICD-10-CM

## 2020-02-12 DIAGNOSIS — I12.9 TYPE 2 DIABETES MELLITUS WITH STAGE 2 CHRONIC KIDNEY DISEASE AND HYPERTENSION: Chronic | ICD-10-CM

## 2020-02-12 DIAGNOSIS — Z01.818 PRE-OP TESTING: ICD-10-CM

## 2020-02-12 DIAGNOSIS — N18.2 TYPE 2 DIABETES MELLITUS WITH STAGE 2 CHRONIC KIDNEY DISEASE AND HYPERTENSION: Chronic | ICD-10-CM

## 2020-02-12 DIAGNOSIS — R69 DIAGNOSIS DEFERRED: ICD-10-CM

## 2020-02-12 DIAGNOSIS — S37.10XS LEFT URETERAL INJURY, SEQUELA: ICD-10-CM

## 2020-02-12 DIAGNOSIS — E11.59 HYPERTENSION ASSOCIATED WITH DIABETES: Chronic | ICD-10-CM

## 2020-02-12 DIAGNOSIS — I50.42 CHRONIC COMBINED SYSTOLIC AND DIASTOLIC CHF (CONGESTIVE HEART FAILURE): Chronic | ICD-10-CM

## 2020-02-12 DIAGNOSIS — E78.5 HYPERLIPIDEMIA ASSOCIATED WITH TYPE 2 DIABETES MELLITUS: Chronic | ICD-10-CM

## 2020-02-12 DIAGNOSIS — Z01.818 PRE-OP EVALUATION: Primary | ICD-10-CM

## 2020-02-12 DIAGNOSIS — E11.22 TYPE 2 DIABETES MELLITUS WITH STAGE 2 CHRONIC KIDNEY DISEASE AND HYPERTENSION: Chronic | ICD-10-CM

## 2020-02-12 DIAGNOSIS — I15.2 HYPERTENSION ASSOCIATED WITH DIABETES: Chronic | ICD-10-CM

## 2020-02-12 DIAGNOSIS — E11.51 TYPE 2 DIABETES MELLITUS WITH DIABETIC PERIPHERAL ANGIOPATHY WITHOUT GANGRENE, WITHOUT LONG-TERM CURRENT USE OF INSULIN: Chronic | ICD-10-CM

## 2020-02-12 DIAGNOSIS — I65.23 CAROTID STENOSIS, BILATERAL: Chronic | ICD-10-CM

## 2020-02-12 DIAGNOSIS — Z93.6 NEPHROSTOMY STATUS: Chronic | ICD-10-CM

## 2020-02-12 DIAGNOSIS — E11.51 TYPE 2 DIABETES MELLITUS WITH DIABETIC PERIPHERAL ANGIOPATHY WITHOUT GANGRENE, UNSPECIFIED WHETHER LONG TERM INSULIN USE: Chronic | ICD-10-CM

## 2020-02-12 DIAGNOSIS — I25.2 HISTORY OF NON-ST ELEVATION MYOCARDIAL INFARCTION (NSTEMI): Chronic | ICD-10-CM

## 2020-02-12 DIAGNOSIS — I25.10 CORONARY ARTERY DISEASE INVOLVING NATIVE CORONARY ARTERY OF NATIVE HEART WITHOUT ANGINA PECTORIS: Primary | Chronic | ICD-10-CM

## 2020-02-12 DIAGNOSIS — G47.30 SLEEP APNEA, UNSPECIFIED TYPE: Chronic | ICD-10-CM

## 2020-02-12 LAB
ABO + RH BLD: NORMAL
ALBUMIN SERPL BCP-MCNC: 3.5 G/DL (ref 3.5–5.2)
ALP SERPL-CCNC: 112 U/L (ref 55–135)
ALT SERPL W/O P-5'-P-CCNC: 15 U/L (ref 10–44)
ANION GAP SERPL CALC-SCNC: 11 MMOL/L (ref 8–16)
AST SERPL-CCNC: 17 U/L (ref 10–40)
BILIRUB SERPL-MCNC: 0.3 MG/DL (ref 0.1–1)
BLD GP AB SCN CELLS X3 SERPL QL: NORMAL
BUN SERPL-MCNC: 19 MG/DL (ref 6–20)
CALCIUM SERPL-MCNC: 9.9 MG/DL (ref 8.7–10.5)
CHLORIDE SERPL-SCNC: 107 MMOL/L (ref 95–110)
CO2 SERPL-SCNC: 26 MMOL/L (ref 23–29)
CREAT SERPL-MCNC: 1 MG/DL (ref 0.5–1.4)
ERYTHROCYTE [DISTWIDTH] IN BLOOD BY AUTOMATED COUNT: 13.7 % (ref 11.5–14.5)
EST. GFR  (AFRICAN AMERICAN): >60 ML/MIN/1.73 M^2
EST. GFR  (NON AFRICAN AMERICAN): >60 ML/MIN/1.73 M^2
GLUCOSE SERPL-MCNC: 151 MG/DL (ref 70–110)
HCT VFR BLD AUTO: 36.8 % (ref 37–48.5)
HGB BLD-MCNC: 10.6 G/DL (ref 12–16)
MCH RBC QN AUTO: 25.6 PG (ref 27–31)
MCHC RBC AUTO-ENTMCNC: 28.8 G/DL (ref 32–36)
MCV RBC AUTO: 89 FL (ref 82–98)
PLATELET # BLD AUTO: 362 K/UL (ref 150–350)
PMV BLD AUTO: 11.1 FL (ref 9.2–12.9)
POTASSIUM SERPL-SCNC: 4 MMOL/L (ref 3.5–5.1)
PROT SERPL-MCNC: 8.3 G/DL (ref 6–8.4)
RBC # BLD AUTO: 4.14 M/UL (ref 4–5.4)
SODIUM SERPL-SCNC: 144 MMOL/L (ref 136–145)
WBC # BLD AUTO: 7.14 K/UL (ref 3.9–12.7)

## 2020-02-12 PROCEDURE — 99999 PR PBB SHADOW E&M-EST. PATIENT-LVL III: ICD-10-PCS | Mod: PBBFAC,,, | Performed by: INTERNAL MEDICINE

## 2020-02-12 PROCEDURE — 93000 ELECTROCARDIOGRAM COMPLETE: CPT | Mod: S$GLB,,, | Performed by: INTERNAL MEDICINE

## 2020-02-12 PROCEDURE — 3046F HEMOGLOBIN A1C LEVEL >9.0%: CPT | Mod: CPTII,S$GLB,, | Performed by: INTERNAL MEDICINE

## 2020-02-12 PROCEDURE — 3078F DIAST BP <80 MM HG: CPT | Mod: CPTII,S$GLB,, | Performed by: INTERNAL MEDICINE

## 2020-02-12 PROCEDURE — 3075F SYST BP GE 130 - 139MM HG: CPT | Mod: CPTII,S$GLB,, | Performed by: INTERNAL MEDICINE

## 2020-02-12 PROCEDURE — 3008F PR BODY MASS INDEX (BMI) DOCUMENTED: ICD-10-PCS | Mod: CPTII,S$GLB,, | Performed by: INTERNAL MEDICINE

## 2020-02-12 PROCEDURE — 87088 URINE BACTERIA CULTURE: CPT

## 2020-02-12 PROCEDURE — 95251 PR GLUCOSE MONITOR, 72 HOUR, PHYS INTERP: ICD-10-PCS | Mod: S$GLB,,, | Performed by: INTERNAL MEDICINE

## 2020-02-12 PROCEDURE — 3046F PR MOST RECENT HEMOGLOBIN A1C LEVEL > 9.0%: ICD-10-PCS | Mod: CPTII,S$GLB,, | Performed by: INTERNAL MEDICINE

## 2020-02-12 PROCEDURE — 80053 COMPREHEN METABOLIC PANEL: CPT

## 2020-02-12 PROCEDURE — 99999 PR PBB SHADOW E&M-EST. PATIENT-LVL III: CPT | Mod: PBBFAC,,, | Performed by: INTERNAL MEDICINE

## 2020-02-12 PROCEDURE — 87086 URINE CULTURE/COLONY COUNT: CPT

## 2020-02-12 PROCEDURE — 85027 COMPLETE CBC AUTOMATED: CPT

## 2020-02-12 PROCEDURE — 95250 PR GLUCOSE MONITORING,72 HRS,SUB-Q SENSOR: ICD-10-PCS | Mod: S$GLB,,, | Performed by: INTERNAL MEDICINE

## 2020-02-12 PROCEDURE — 3008F BODY MASS INDEX DOCD: CPT | Mod: CPTII,S$GLB,, | Performed by: INTERNAL MEDICINE

## 2020-02-12 PROCEDURE — 95251 CONT GLUC MNTR ANALYSIS I&R: CPT | Mod: S$GLB,,, | Performed by: INTERNAL MEDICINE

## 2020-02-12 PROCEDURE — 99214 PR OFFICE/OUTPT VISIT, EST, LEVL IV, 30-39 MIN: ICD-10-PCS | Mod: S$GLB,,, | Performed by: INTERNAL MEDICINE

## 2020-02-12 PROCEDURE — 3079F DIAST BP 80-89 MM HG: CPT | Mod: CPTII,S$GLB,, | Performed by: INTERNAL MEDICINE

## 2020-02-12 PROCEDURE — 3079F PR MOST RECENT DIASTOLIC BLOOD PRESSURE 80-89 MM HG: ICD-10-PCS | Mod: CPTII,S$GLB,, | Performed by: INTERNAL MEDICINE

## 2020-02-12 PROCEDURE — 99499 NO LOS: ICD-10-PCS | Mod: S$GLB,,, | Performed by: UROLOGY

## 2020-02-12 PROCEDURE — 95250 CONT GLUC MNTR PHYS/QHP EQP: CPT | Mod: S$GLB,,, | Performed by: INTERNAL MEDICINE

## 2020-02-12 PROCEDURE — 93000 EKG 12-LEAD: ICD-10-PCS | Mod: S$GLB,,, | Performed by: INTERNAL MEDICINE

## 2020-02-12 PROCEDURE — 99999 PR PBB SHADOW E&M-EST. PATIENT-LVL III: CPT | Mod: PBBFAC,,,

## 2020-02-12 PROCEDURE — 3078F PR MOST RECENT DIASTOLIC BLOOD PRESSURE < 80 MM HG: ICD-10-PCS | Mod: CPTII,S$GLB,, | Performed by: INTERNAL MEDICINE

## 2020-02-12 PROCEDURE — 3074F PR MOST RECENT SYSTOLIC BLOOD PRESSURE < 130 MM HG: ICD-10-PCS | Mod: CPTII,S$GLB,, | Performed by: INTERNAL MEDICINE

## 2020-02-12 PROCEDURE — 99214 OFFICE O/P EST MOD 30 MIN: CPT | Mod: S$GLB,,, | Performed by: INTERNAL MEDICINE

## 2020-02-12 PROCEDURE — 99499 UNLISTED E&M SERVICE: CPT | Mod: S$GLB,,, | Performed by: UROLOGY

## 2020-02-12 PROCEDURE — 99999 PR PBB SHADOW E&M-EST. PATIENT-LVL III: ICD-10-PCS | Mod: PBBFAC,,,

## 2020-02-12 PROCEDURE — 3075F PR MOST RECENT SYSTOLIC BLOOD PRESS GE 130-139MM HG: ICD-10-PCS | Mod: CPTII,S$GLB,, | Performed by: INTERNAL MEDICINE

## 2020-02-12 PROCEDURE — 3074F SYST BP LT 130 MM HG: CPT | Mod: CPTII,S$GLB,, | Performed by: INTERNAL MEDICINE

## 2020-02-12 PROCEDURE — 87106 FUNGI IDENTIFICATION YEAST: CPT

## 2020-02-12 PROCEDURE — 36415 COLL VENOUS BLD VENIPUNCTURE: CPT

## 2020-02-12 PROCEDURE — 86850 RBC ANTIBODY SCREEN: CPT

## 2020-02-12 RX ORDER — HEPARIN SODIUM 5000 [USP'U]/ML
5000 INJECTION, SOLUTION INTRAVENOUS; SUBCUTANEOUS ONCE
Status: CANCELLED | OUTPATIENT
Start: 2020-02-12 | End: 2020-02-12

## 2020-02-12 RX ORDER — FLUCONAZOLE 2 MG/ML
400 INJECTION, SOLUTION INTRAVENOUS
Status: CANCELLED | OUTPATIENT
Start: 2020-02-12

## 2020-02-12 NOTE — PROGRESS NOTES
"Subjective:   Patient ID:  Mariann Huff is a 59 y.o. female who presents for follow-up of Hypertension      HPI: Patient with multiple medical problems, presents prior to planned corrective urological procedure. During her last hospitalization in June she had type 2 MI, following which LV function by CFD decreased to 35-40%. Patient was scheduled to repeat this test to reassess LV function and guide further therapeutic approach. This was not done.    Today she denies chest pain or shortness of breath, however she is very sedentary. BP is elevated; patient did not take her meds today.  Diabetes remains uncontrolled.    ECG. NSR, non-specific ST-T wave changes.      Lab Results   Component Value Date     11/21/2019    K 5.1 11/21/2019     11/21/2019    CO2 24 11/21/2019    BUN 33 (H) 11/21/2019    CREATININE 1.2 11/21/2019     (H) 11/21/2019    HGBA1C 9.2 (H) 12/06/2019    MG 1.9 11/13/2019    AST 16 11/21/2019    ALT 16 11/21/2019    ALBUMIN 3.6 11/21/2019    PROT 8.3 11/21/2019    BILITOT 0.6 11/21/2019    WBC 11.63 10/28/2019    HGB 11.8 (L) 10/28/2019    HCT 37.2 10/28/2019    HCT 19 (LL) 05/19/2019    MCV 83 10/28/2019     10/28/2019    INR 0.9 11/25/2019    TSH 2.852 08/22/2019    CHOL 169 08/22/2019    HDL 37 (L) 08/22/2019    LDLCALC 107.6 08/22/2019    TRIG 122 08/22/2019       ROS    Objective:   Physical Exam   Constitutional: She is oriented to person, place, and time. She appears well-developed and well-nourished.   BP (!) 187/79 Comment: B/p in left arm. Pt had labs in r arm today.  Pulse 77   Ht 5' 4" (1.626 m)   Wt 70.1 kg (154 lb 10.4 oz)   LMP  (LMP Unknown)   BMI 26.55 kg/m²      HENT:   Head: Normocephalic.   Eyes: Pupils are equal, round, and reactive to light.   Neck: Normal range of motion. Neck supple. Carotid bruit is not present. No thyromegaly present.   Cardiovascular: Normal rate, regular rhythm, normal heart sounds and intact distal pulses. Exam reveals " no gallop and no friction rub.   No murmur heard.  Pulses:       Carotid pulses are 2+ on the right side, and 2+ on the left side.       Radial pulses are 2+ on the right side, and 2+ on the left side.        Femoral pulses are 2+ on the right side, and 2+ on the left side.       Popliteal pulses are 2+ on the right side, and 2+ on the left side.        Dorsalis pedis pulses are 2+ on the right side, and 2+ on the left side.        Posterior tibial pulses are 2+ on the right side, and 2+ on the left side.   Pulmonary/Chest: Effort normal and breath sounds normal. No respiratory distress. She has no wheezes. She has no rales. She exhibits no tenderness.   Abdominal: Soft. Bowel sounds are normal.   obese   Musculoskeletal: Normal range of motion. She exhibits no edema.   Neurological: She is alert and oriented to person, place, and time.   Skin: Skin is warm and dry.   Psychiatric: She has a normal mood and affect.   Nursing note and vitals reviewed.        Assessment and Plan:     Problem List Items Addressed This Visit        Cardiology Problems    Hypertension associated with diabetes (Chronic)    Relevant Orders    IN OFFICE EKG 12-LEAD (to Muse) (Completed)    Stress Echo Which stress agent will be used? Pharm    CAD (coronary artery disease) - Primary (Chronic)    Relevant Orders    IN OFFICE EKG 12-LEAD (to Muse) (Completed)    Stress Echo Which stress agent will be used? Pharm    Carotid stenosis, bilateral (Chronic)    Relevant Orders    IN OFFICE EKG 12-LEAD (to Muse) (Completed)    Stress Echo Which stress agent will be used? Pharm    Type 2 diabetes mellitus with diabetic peripheral angiopathy without gangrene (Chronic)    Relevant Orders    IN OFFICE EKG 12-LEAD (to Muse) (Completed)    Stress Echo Which stress agent will be used? Pharm    (HFpEF) heart failure with preserved ejection fraction (Chronic)    Relevant Orders    IN OFFICE EKG 12-LEAD (to Muse) (Completed)    Stress Echo Which stress agent  will be used? Pharm    Chronic combined systolic and diastolic CHF (congestive heart failure) (Chronic)    Relevant Orders    IN OFFICE EKG 12-LEAD (to Muse) (Completed)    Stress Echo Which stress agent will be used? Pharm       Other    Hyperlipidemia associated with type 2 diabetes mellitus (Chronic)    Relevant Orders    IN OFFICE EKG 12-LEAD (to Muse) (Completed)    Stress Echo Which stress agent will be used? Pharm    Sleep apnea (Chronic)    Relevant Orders    IN OFFICE EKG 12-LEAD (to Muse) (Completed)    Stress Echo Which stress agent will be used? Pharm    History of non-ST elevation myocardial infarction (NSTEMI) (Chronic)    Relevant Orders    IN OFFICE EKG 12-LEAD (to Muse) (Completed)    Stress Echo Which stress agent will be used? Pharm    Nephrostomy status (Chronic)    Relevant Orders    IN OFFICE EKG 12-LEAD (to Muse) (Completed)    Stress Echo Which stress agent will be used? 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    ALBUTEROL-IPRATROPIUM (DUO-NEB) 2.5 MG-0.5 MG/3 ML NEBULIZER SOLUTION    Inhale 3 mLs into the lungs.    AMLODIPINE (NORVASC) 10 MG TABLET    TAKE 1 TABLET BY MOUTH EVERY DAY    ASPIRIN 81 MG TAB    Take 81 mg by mouth. 1 Tablet Oral Every day    ATORVASTATIN (LIPITOR) 80 MG TABLET    1 tablet (80 mg total) by Per G Tube route once daily.    BLOOD SUGAR DIAGNOSTIC (ACCU-CHEK EDIN PLUS TEST STRP) STRP    TEST BLOOD SUGARS 4 TIMES DAILY    CEFADROXIL  "(DURICEF) 1 GRAM TABLET    TAKE 1 TABLET (1 G TOTAL) BY MOUTH 2 (TWO) TIMES DAILY.    CLOPIDOGREL (PLAVIX) 75 MG TABLET    Take 1 tablet (75 mg total) by mouth once daily.    ESCITALOPRAM OXALATE (LEXAPRO) 10 MG TABLET    Take 1 tablet (10 mg total) by mouth once daily.    FAMOTIDINE (PEPCID) 20 MG TABLET    Take 1 tablet (20 mg total) by mouth 2 (two) times daily.    FUROSEMIDE (LASIX) 40 MG TABLET    Take 1 tablet (40 mg total) by mouth once daily.    GABAPENTIN (NEURONTIN) 300 MG CAPSULE    Take 1 capsule (300 mg total) by mouth 2 (two) times daily.    HYDROCODONE-ACETAMINOPHEN (NORCO) 5-325 MG PER TABLET    Take 1 tablet by mouth every 6 (six) hours as needed for Pain.    INSULIN ASPART U-100 (NOVOLOG FLEXPEN U-100 INSULIN) 100 UNIT/ML (3 ML) INPN PEN    Inject 6 Units into the skin 3 (three) times daily with meals.    INSULIN GLARGINE U-300 CONC (TOUJEO MAX U-300 SOLOSTAR) 300 UNIT/ML (3 ML) INPN    Inject 18 Units into the skin every evening.    LOSARTAN (COZAAR) 100 MG TABLET    Take 1 tablet (100 mg total) by mouth once daily.    METOPROLOL TARTRATE (LOPRESSOR) 25 MG TABLET    Take 1 tablet (25 mg total) by mouth 2 (two) times daily.    MULTIVITAMIN (THERAGRAN) PER TABLET    Take 1 tablet by mouth once daily.    NITROGLYCERIN (NITROSTAT) 0.4 MG SL TABLET    Take one every 2-3 min till chestpain relief for 3 times and if still no relief, call MD or come to ED    ONDANSETRON (ZOFRAN-ODT) 8 MG TBDL    Take 1 tablet (8 mg total) by mouth every 12 (twelve) hours as needed.    OXYCODONE-ACETAMINOPHEN (PERCOCET)  MG PER TABLET    Take 1 tablet by mouth every 12 (twelve) hours as needed for Pain (For severe pain).    PEN NEEDLE, DIABETIC (NOVOTWIST) 32 GAUGE X 1/5" NDLE    Use 1 needle to inject insulin four times daily    POTASSIUM CHLORIDE SA (K-DUR,KLOR-CON) 20 MEQ TABLET    Take 1 tablet (20 mEq total) by mouth 2 (two) times daily.    ZOLPIDEM (AMBIEN) 5 MG TAB    Take 1 tablet (5 mg total) by mouth " nightly as needed.   Modified Medications    No medications on file   Discontinued Medications    AMLODIPINE (NORVASC) 10 MG TABLET    10 mg by Tube route.    AZITHROMYCIN (ZITHROMAX Z-PHONG) 250 MG TABLET    As directed    RIFAMPIN (RIFADIN) 300 MG CAPSULE       Patient is an intermediate risk ( RCRI 10%) for and intermediate risk type surgery.  Given her complex history and unresolved issue of LV dysfunction following type 2 MI after her last prolonged hospitalization I scheduled her for Dobutamine stress echo with CFD. Clearance is pending.    No follow-ups on file.

## 2020-02-12 NOTE — PATIENT INSTRUCTIONS
albuterol (take if needed), amlodipine 10 mg (take), aspirin 81 mg (hold week prior to surgery if necessary), Lipitor 80 mg (take), Plavix 75 mg (may hold up to week prior to surgery), Lexapro 10 mg (take), Pepcid 20 mg (take), Lasix 40 mg (hold morning of surgery), gabapentin 300 mg (take), hydrocodone 5/325 mg (take if needed), losartan 100 mg (take), Lopressor 25 mg (take), Zofran (take if needed), Ambien 5 mg (take night before), rifampin 300 mg (take), Tuojeo 18 units (take 13 units night before surgery), NovoLog (hold)

## 2020-02-12 NOTE — Clinical Note
She may proceed for surgery from my standpoint once she gets the stress test ordered by cardiology this morning.Jasbir Haney

## 2020-02-12 NOTE — PROGRESS NOTES
Subjective:       Patient ID: Mariann Huff is a 59 y.o. female.    Chief Complaint: Pre-op Exam    HPI     60 yo female here for pre-op evaluation of nephrostomy reversal.    Denies CP/SOB/nausea/MI last 3 months, diagnosed with heart failure, never diagnosed with arrhythmias, never diagnosed with valvular heart disease.    RCRI criteria: + IDDM, + CAD, - CVA, - chronic renal insufficiency, + heart failure, - high risk surgery    Functional status: She is back to her regular activity level cleaning her house and washing her clothes.  She has a big house and walks though it.    Bleeding risk - history of bleeding with prior surgeries - none    History of prior anesthetic complications - none    - tobacco, - EtOH, - Illicit substances    Chronic conditions/medication usage - albuterol (take if needed), amlodipine 10 mg (take), aspirin 81 mg (hold week prior to surgery if necessary), Lipitor 80 mg (take), Plavix 75 mg (may hold up to week prior to surgery), Lexapro 10 mg (take), Pepcid 20 mg (take), Lasix 40 mg (hold morning of surgery), gabapentin 300 mg (take), hydrocodone 5/325 mg (take if needed), losartan 100 mg (take), Lopressor 25 mg (take), Zofran (take if needed), Ambien 5 mg (take night before), rifampin 300 mg (take), Tuojeo 18 units (take 13 units night before surgery), NovoLog (hold)    Chronic Steroid usage - none    Review of Systems    Objective:      Physical Exam   Constitutional: She is oriented to person, place, and time. She appears well-developed and well-nourished.   HENT:   Head: Normocephalic and atraumatic.   Mouth/Throat: No oropharyngeal exudate.   Eyes: Pupils are equal, round, and reactive to light. EOM are normal. Right eye exhibits no discharge. Left eye exhibits no discharge. No scleral icterus.   Neck: Normal range of motion. Neck supple. No tracheal deviation present. No thyromegaly present.   Cardiovascular: Normal rate, regular rhythm and normal heart sounds. Exam reveals no  gallop and no friction rub.   No murmur heard.  Pulmonary/Chest: Effort normal and breath sounds normal. No respiratory distress. She has no wheezes. She has no rales. She exhibits no tenderness.   Abdominal: Soft. Bowel sounds are normal. She exhibits no distension and no mass. There is no tenderness. There is no rebound and no guarding.   Musculoskeletal: Normal range of motion. She exhibits no edema or tenderness.   Neurological: She is alert and oriented to person, place, and time.   Skin: Skin is warm and dry. No rash noted. No erythema. No pallor.   Psychiatric: She has a normal mood and affect. Her behavior is normal.   Vitals reviewed.      Assessment:       1. Pre-op evaluation    2. Type 2 diabetes mellitus with stage 2 chronic kidney disease and hypertension    3. Type 2 diabetes mellitus with diabetic peripheral angiopathy without gangrene, unspecified whether long term insulin use    4. 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 Hypertension associated with diabetes    6. Hyperlipidemia associated with type 2 diabetes mellitus    7. Chronic combined systolic and diastolic CHF (congestive heart failure)    8. Heart failure with preserved ejection fraction, unspecified HF chronicity        Plan:       1.  Surgery is not emergent.  Patient has no active cardiac conditions.  Surgery is not low risk.  Patient does not have greater than 4 METs.  Agree with stress test as ordered by Cardiology.  2/3/4.  Continue tuojeo 18 units, NovoLog 6 units t.i.d.  5.  Continue amlodipine 10 mg, Lopressor 25 mg b.i.d., losartan 100 mg daily.  6.  Continue Lipitor 80 mg daily.  7/8.  Continue Lasix 40 mg.  9.  EKG done by Cardiology.  10.  Pending lab work  11.  DVT prevention - Recommend use of TEDs, and early ambulation if able.  Consider short term use of anti-coagulation if appropriate post-op.  12.   Chronic medications - halbuterol (take if needed), amlodipine 10 mg (take), aspirin 81 mg (hold week prior to surgery if necessary), Lipitor 80 mg (take), Plavix 75 mg (may hold up to week prior to surgery), Lexapro 10 mg (take), Pepcid 20 mg (take), Lasix 40 mg (hold morning of surgery), gabapentin 300 mg (take), hydrocodone 5/325 mg (take if needed), losartan 100 mg (take), Lopressor 25 mg (take), Zofran (take if needed), Ambien 5 mg (take night before), rifampin 300 mg (take), Tuojeo 18 units (take 13 units night before surgery), NovoLog (hold)  13. Minimize urinary catheter use.

## 2020-02-13 ENCOUNTER — TELEPHONE (OUTPATIENT)
Dept: UROLOGY | Facility: HOSPITAL | Age: 60
End: 2020-02-13

## 2020-02-13 LAB — BACTERIA UR CULT: NO GROWTH

## 2020-02-13 NOTE — TELEPHONE ENCOUNTER
Patient is undergoing ureteral reimplant on 2/19/20 and is undergoing cardiac stress test on 2/18. Still pending cardiology clearance based on stress test results. Gained clearance from PCP to hold ASA/Plavix for one week pre-op. After discussion with Dr. Boyd, will have patient hold ASA/Plavix starting today. Notified patient and she voiced understanding.    DANISHA Hyatt MD  Urology, PGY-2  Pager: [phone]

## 2020-02-14 ENCOUNTER — TELEPHONE (OUTPATIENT)
Dept: UROLOGY | Facility: CLINIC | Age: 60
End: 2020-02-14

## 2020-02-14 ENCOUNTER — HOSPITAL ENCOUNTER (OUTPATIENT)
Dept: CARDIOLOGY | Facility: CLINIC | Age: 60
Discharge: HOME | End: 2020-02-14
Attending: INTERNAL MEDICINE
Payer: COMMERCIAL

## 2020-02-14 VITALS
RESPIRATION RATE: 18 BRPM | HEIGHT: 64 IN | HEART RATE: 61 BPM | SYSTOLIC BLOOD PRESSURE: 126 MMHG | BODY MASS INDEX: 26.29 KG/M2 | DIASTOLIC BLOOD PRESSURE: 66 MMHG | WEIGHT: 154 LBS

## 2020-02-14 DIAGNOSIS — I25.2 HISTORY OF NON-ST ELEVATION MYOCARDIAL INFARCTION (NSTEMI): ICD-10-CM

## 2020-02-14 DIAGNOSIS — E11.51 TYPE 2 DIABETES MELLITUS WITH DIABETIC PERIPHERAL ANGIOPATHY WITHOUT GANGRENE, WITHOUT LONG-TERM CURRENT USE OF INSULIN: ICD-10-CM

## 2020-02-14 DIAGNOSIS — G47.30 SLEEP APNEA, UNSPECIFIED TYPE: ICD-10-CM

## 2020-02-14 DIAGNOSIS — I50.30 HEART FAILURE WITH PRESERVED EJECTION FRACTION, UNSPECIFIED HF CHRONICITY: ICD-10-CM

## 2020-02-14 DIAGNOSIS — B37.41 YEAST CYSTITIS: Primary | ICD-10-CM

## 2020-02-14 DIAGNOSIS — E11.69 HYPERLIPIDEMIA ASSOCIATED WITH TYPE 2 DIABETES MELLITUS: ICD-10-CM

## 2020-02-14 DIAGNOSIS — E78.5 HYPERLIPIDEMIA ASSOCIATED WITH TYPE 2 DIABETES MELLITUS: ICD-10-CM

## 2020-02-14 DIAGNOSIS — I65.23 CAROTID STENOSIS, BILATERAL: ICD-10-CM

## 2020-02-14 DIAGNOSIS — E11.59 HYPERTENSION ASSOCIATED WITH DIABETES: ICD-10-CM

## 2020-02-14 DIAGNOSIS — I15.2 HYPERTENSION ASSOCIATED WITH DIABETES: ICD-10-CM

## 2020-02-14 DIAGNOSIS — I25.10 CORONARY ARTERY DISEASE INVOLVING NATIVE CORONARY ARTERY OF NATIVE HEART WITHOUT ANGINA PECTORIS: ICD-10-CM

## 2020-02-14 DIAGNOSIS — I50.42 CHRONIC COMBINED SYSTOLIC AND DIASTOLIC CHF (CONGESTIVE HEART FAILURE): ICD-10-CM

## 2020-02-14 DIAGNOSIS — Z93.6 NEPHROSTOMY STATUS: ICD-10-CM

## 2020-02-14 LAB
ASCENDING AORTA: 2.59 CM
BSA FOR ECHO PROCEDURE: 1.78 M2
CV ECHO LV RWT: 0.37 CM
CV STRESS BASE HR: 61 BPM
DIASTOLIC BLOOD PRESSURE: 66 MMHG
DOP CALC LVOT AREA: 3.6 CM2
DOP CALC LVOT DIAMETER: 2.13 CM
DOP CALC LVOT PEAK VEL: 0.9 M/S
DOP CALC LVOT STROKE VOLUME: 90.07 CM3
DOP CALCLVOT PEAK VEL VTI: 25.29 CM
E WAVE DECELERATION TIME: 195.98 MSEC
E/A RATIO: 0.89
E/E' RATIO: 15 M/S
ECHO LV POSTERIOR WALL: 0.83 CM (ref 0.6–1.1)
FRACTIONAL SHORTENING: 28 % (ref 28–44)
INTERVENTRICULAR SEPTUM: 0.87 CM (ref 0.6–1.1)
LA MAJOR: 4.33 CM
LA MINOR: 4.4 CM
LA WIDTH: 3.22 CM
LEFT ATRIUM SIZE: 3.06 CM
LEFT ATRIUM VOLUME INDEX: 20.9 ML/M2
LEFT ATRIUM VOLUME: 36.56 CM3
LEFT INTERNAL DIMENSION IN SYSTOLE: 3.21 CM (ref 2.1–4)
LEFT VENTRICLE DIASTOLIC VOLUME INDEX: 51.23 ML/M2
LEFT VENTRICLE DIASTOLIC VOLUME: 89.68 ML
LEFT VENTRICLE MASS INDEX: 69 G/M2
LEFT VENTRICLE SYSTOLIC VOLUME INDEX: 23.5 ML/M2
LEFT VENTRICLE SYSTOLIC VOLUME: 41.21 ML
LEFT VENTRICULAR INTERNAL DIMENSION IN DIASTOLE: 4.44 CM (ref 3.5–6)
LEFT VENTRICULAR MASS: 120.36 G
LV LATERAL E/E' RATIO: 12.5 M/S
LV SEPTAL E/E' RATIO: 18.75 M/S
MV PEAK A VEL: 0.84 M/S
MV PEAK E VEL: 0.75 M/S
OHS CV CPX 1 MINUTE RECOVERY HEART RATE: 130 BPM
OHS CV CPX 85 PERCENT MAX PREDICTED HEART RATE MALE: 132
OHS CV CPX MAX PREDICTED HEART RATE: 155
OHS CV CPX PATIENT IS FEMALE: 1
OHS CV CPX PATIENT IS MALE: 0
OHS CV CPX PEAK DIASTOLIC BLOOD PRESSURE: 47 MMHG
OHS CV CPX PEAK HEAR RATE: 133 BPM
OHS CV CPX PEAK RATE PRESSURE PRODUCT: NORMAL
OHS CV CPX PEAK SYSTOLIC BLOOD PRESSURE: 169 MMHG
OHS CV CPX PERCENT MAX PREDICTED HEART RATE ACHIEVED: 86
OHS CV CPX RATE PRESSURE PRODUCT PRESENTING: 8540
RA MAJOR: 3.65 CM
RA PRESSURE: 3 MMHG
RA WIDTH: 2.57 CM
RIGHT VENTRICULAR END-DIASTOLIC DIMENSION: 2.78 CM
SINUS: 2.98 CM
STJ: 2.35 CM
SYSTOLIC BLOOD PRESSURE: 140 MMHG
TDI LATERAL: 0.06 M/S
TDI SEPTAL: 0.04 M/S
TDI: 0.05 M/S
TRICUSPID ANNULAR PLANE SYSTOLIC EXCURSION: 2.3 CM

## 2020-02-14 PROCEDURE — 96372 THER/PROPH/DIAG INJ SC/IM: CPT | Mod: S$GLB,,, | Performed by: INTERNAL MEDICINE

## 2020-02-14 PROCEDURE — 93351 STRESS ECHO (CUPID ONLY): ICD-10-PCS | Mod: S$GLB,,, | Performed by: INTERNAL MEDICINE

## 2020-02-14 PROCEDURE — 96372 PR INJECTION,THERAP/PROPH/DIAG2ST, IM OR SUBCUT: ICD-10-PCS | Mod: S$GLB,,, | Performed by: INTERNAL MEDICINE

## 2020-02-14 PROCEDURE — 93351 STRESS TTE COMPLETE: CPT | Mod: S$GLB,,, | Performed by: INTERNAL MEDICINE

## 2020-02-14 RX ORDER — FLUCONAZOLE 100 MG/1
100 TABLET ORAL DAILY
Qty: 7 TABLET | Refills: 0 | Status: ON HOLD | OUTPATIENT
Start: 2020-02-14 | End: 2020-02-21 | Stop reason: HOSPADM

## 2020-02-14 RX ORDER — DOBUTAMINE HYDROCHLORIDE 200 MG/100ML
40 INJECTION INTRAVENOUS
Status: COMPLETED | OUTPATIENT
Start: 2020-02-14 | End: 2020-02-14

## 2020-02-14 RX ORDER — ATROPINE SULFATE 0.1 MG/ML
1 INJECTION INTRAVENOUS
Status: COMPLETED | OUTPATIENT
Start: 2020-02-14 | End: 2020-02-14

## 2020-02-14 RX ADMIN — ATROPINE SULFATE 1 MG: 0.1 INJECTION INTRAVENOUS at 04:02

## 2020-02-14 RX ADMIN — DOBUTAMINE HYDROCHLORIDE 40 MCG/KG/MIN: 200 INJECTION INTRAVENOUS at 04:02

## 2020-02-14 NOTE — TELEPHONE ENCOUNTER
Yeast cystitis  -     fluconazole (DIFLUCAN) 100 MG tablet; Take 1 tablet (100 mg total) by mouth once daily. for 7 days  Dispense: 7 tablet; Refill: 0    eRxed to the pharmacy.  Please call and advise the patient to take the medication as given for her upcoming surgery.

## 2020-02-15 LAB — BACTERIA UR CULT: ABNORMAL

## 2020-02-17 ENCOUNTER — TELEPHONE (OUTPATIENT)
Dept: CARDIOLOGY | Facility: CLINIC | Age: 60
End: 2020-02-17

## 2020-02-17 ENCOUNTER — TELEPHONE (OUTPATIENT)
Dept: UROLOGY | Facility: CLINIC | Age: 60
End: 2020-02-17

## 2020-02-17 NOTE — TELEPHONE ENCOUNTER
----- Message from Adelaide Aguilar MD sent at 2/14/2020  5:16 PM CST -----  Please inform the patient that her Dobutamine echo was negative for ischemia and heart function was normal.  Great news.  She is cleared for surgery.

## 2020-02-17 NOTE — TELEPHONE ENCOUNTER
Left a message.   detailed exam detailed exam detailed exam detailed exam detailed exam detailed exam detailed exam

## 2020-02-17 NOTE — TELEPHONE ENCOUNTER
----- Message from Amber Mayer LPN sent at 2/17/2020  7:50 AM CST -----      ----- Message -----  From: Cynthia Smith MA  Sent: 2/17/2020   7:47 AM CST  To: Robin Boyd MD, [facility] Staff        ----- Message -----  From: Adelaide Aguilar MD  Sent: 2/14/2020   5:17 PM CST  To: Robin Boyd MD, Cynthia Smith MA    Patient is cleared for surgery. ASA can be held 7 days and Plavix 5 days prior to surgery and both restarted ASAP.

## 2020-02-18 ENCOUNTER — ANESTHESIA EVENT (OUTPATIENT)
Dept: SURGERY | Facility: HOSPITAL | Age: 60
DRG: 654 | End: 2020-02-18
Payer: COMMERCIAL

## 2020-02-18 ENCOUNTER — TELEPHONE (OUTPATIENT)
Dept: PEDIATRIC UROLOGY | Facility: CLINIC | Age: 60
End: 2020-02-18

## 2020-02-18 NOTE — TELEPHONE ENCOUNTER
Called pt to confirm arrival time of 8:30a for procedure on 2/19/20. Gave pt NPO instructions and gave pt opportunity to ask questions. Pt verbalized understanding.

## 2020-02-18 NOTE — ANESTHESIA PREPROCEDURE EVALUATION
[facility]  Anesthesia Pre-Operative Evaluation         Patient Name: Mariann Huff  YOB: 1960  MRN: [id number]    SUBJECTIVE:     Pre-operative evaluation for Procedure(s) (LRB):  LAPAROTOMY, EXPLORATORY (N/A)  LYSIS, ADHESIONS (N/A)  REIMPLANTATION, URETER WITH PSOAS HITCH (Left)  CYSTOSCOPY, WITH URETERAL STENT INSERTION (Left)  REPLACEMENT, NEPHROSTOMY TUBE removal removal (Right)     02/18/2020    Mariann Huff is a 59 y.o. female w/ a significant PMHx of HTN, HLD, DM, MURTAZA (not on CPAP), HFpEF, CAD s/p stent placement (LAD), hx of NSTEMI (type 2), carotid stenosis, hx of DVTs s/p IVC filter, and asthma who presents for L ureteral stent re-implantation.    Patient has a history of OLIF w/ neurosurgery (4/2019) that was c/b L ureteral injury s/p anastomosis and stent placement. Recovery was c/b by severe sepsis requiring prolonged SICU stay w/ difficulty weaning from vent resulting in trach/PEG placement (decannulated and PEG removed).     Patient now presents for the above procedure(s).    Recent stress ECHO 2/14/20: Negative for ischemia, normal LVSF (EF 55%), normal RVSF, indeterminate LVDF    LDA:        Peripheral IV - Single Lumen 10/07/19 1150 20 G Left Forearm (Active)   Number of days: 134            Peripheral IV - Single Lumen 10/28/19 1903 22 G Left Hand (Active)   Number of days: 112            Nephrostomy 10/07/19 1507 Left 8 Fr. (Active)   Number of days: 133            Nephrostomy 11/25/19 1052 Left 10 Fr. (Active)   Number of days: 85            Gastrostomy/Enterostomy 05/02/19 1134 Percutaneous endoscopic gastrostomy (PEG) LUQ decompression;feeding (Active)   Number of days: 292       Prev airway: Present Prior to Hospital Arrival?: No; Placement Date: 12/11/19; Placement Time: 0748; Method of Intubation: Direct laryngoscopy; Inserted by: CRNA; Airway Device: Endotracheal Tube; Mask Ventilation: Easy; Intubated: Postinduction; Blade: Eufemia #3; Style:  Cuffed; Cuff Inflation: Minimal occlusive pressure; Placement Verified By: Auscultation, Capnometry, ETT Condensation; Grade: Grade I; Complicating Factors: None    Drips: None documented.      Patient Active Problem List   Diagnosis    Hypertension associated with diabetes    Hyperlipidemia associated with type 2 diabetes mellitus    CAD (coronary artery disease)    Sleep apnea    Carotid stenosis, bilateral    History of non-ST elevation myocardial infarction (NSTEMI)    S/P coronary artery stent placement    Nuclear sclerosis - Right Eye    Primary localized osteoarthrosis, lower leg    Chronic sinusitis    PSC (posterior subcapsular cataract), right    DME (diabetic macular edema)    Refractive error    Pseudophakia    Senile nuclear sclerosis    Type 2 diabetes mellitus with diabetic peripheral angiopathy without gangrene    Diabetic peripheral neuropathy associated with type 2 diabetes mellitus    Cervical arthritis    Neurogenic claudication    Lumbar herniated disc    Fatty liver disease, nonalcoholic    Arthritis of lumbar spine    Chronic pain    Lumbar radiculopathy    Lumbosacral radiculopathy at L5    Ureteral transection of left native kidney    Type 2 diabetes mellitus with stage 2 chronic kidney disease and hypertension    Asthma    Normocytic anemia    Bradycardia    Delayed surgical wound healing    Rectal ulcer    Palliative care encounter    Acute deep vein thrombosis (DVT) of femoral vein of right lower extremity    Impaired functional mobility and endurance    (HFpEF) heart failure with preserved ejection fraction    Alteration in skin integrity    Debility    Staph infection    Chronic combined systolic and diastolic CHF (congestive heart failure)    Nephrostomy status    Left ureteral injury    Hydronephrosis    Serum albumin decreased    Weakness of both lower extremities    Poor balance    Intraoperative ureteral injury    Uncontrolled type 2  diabetes mellitus with both eyes affected by severe nonproliferative retinopathy and macular edema, with long-term current use of insulin    Hypertensive retinopathy, bilateral       Review of patient's allergies indicates:   Allergen Reactions    Milk containing products      Causes GI distress       Current Inpatient Medications:      Current Facility-Administered Medications on File Prior to Encounter   Medication Dose Route Frequency Provider Last Rate Last Dose    lidocaine (PF) 10 mg/ml (1%) injection 10 mg  1 mL Intradermal Once Dave Bentley Jr., MD         Current Outpatient Medications on File Prior to Encounter   Medication Sig Dispense Refill    amLODIPine (NORVASC) 10 MG tablet TAKE 1 TABLET BY MOUTH EVERY DAY 90 tablet 2    aspirin 81 mg Tab Take 81 mg by mouth. 1 Tablet Oral Every day      atorvastatin (LIPITOR) 80 MG tablet 1 tablet (80 mg total) by Per G Tube route once daily. 90 tablet 3    clopidogrel (PLAVIX) 75 mg tablet Take 1 tablet (75 mg total) by mouth once daily. 90 tablet 3    escitalopram oxalate (LEXAPRO) 10 MG tablet Take 1 tablet (10 mg total) by mouth once daily. 30 tablet 11    famotidine (PEPCID) 20 MG tablet Take 1 tablet (20 mg total) by mouth 2 (two) times daily. 60 tablet 11    furosemide (LASIX) 40 MG tablet Take 1 tablet (40 mg total) by mouth once daily. 30 tablet 11    gabapentin (NEURONTIN) 300 MG capsule Take 1 capsule (300 mg total) by mouth 2 (two) times daily. 60 capsule 11    metoprolol tartrate (LOPRESSOR) 25 MG tablet Take 1 tablet (25 mg total) by mouth 2 (two) times daily. 60 tablet 11    multivitamin (THERAGRAN) per tablet Take 1 tablet by mouth once daily.      potassium chloride SA (K-DUR,KLOR-CON) 20 MEQ tablet Take 1 tablet (20 mEq total) by mouth 2 (two) times daily. 60 tablet 11    zolpidem (AMBIEN) 5 MG Tab Take 1 tablet (5 mg total) by mouth nightly as needed. 30 tablet 2    ACCU-CHEK EDIN PLUS METER Misc       albuterol-ipratropium  (DUO-NEB) 2.5 mg-0.5 mg/3 mL nebulizer solution Inhale 3 mLs into the lungs.      blood sugar diagnostic (ACCU-CHEK EDIN PLUS TEST STRP) Strp TEST BLOOD SUGARS 4 TIMES DAILY 150 strip 11    cefadroxil (DURICEF) 1 gram tablet TAKE 1 TABLET (1 G TOTAL) BY MOUTH 2 (TWO) TIMES DAILY.  3    HYDROcodone-acetaminophen (NORCO) 5-325 mg per tablet Take 1 tablet by mouth every 6 (six) hours as needed for Pain. 28 tablet 0    nitroGLYCERIN (NITROSTAT) 0.4 MG SL tablet Take one every 2-3 min till chestpain relief for 3 times and if still no relief, call MD or come to ED 30 tablet 11    ondansetron (ZOFRAN-ODT) 8 MG TbDL Take 1 tablet (8 mg total) by mouth every 12 (twelve) hours as needed. 30 tablet 2       Past Surgical History:   Procedure Laterality Date    ANTEGRADE NEPHROSTOGRAPHY Left 2019    Procedure: Nephrostogram - antegrade;  Surgeon: Robin Boyd MD;  Location: [address];  Service: Urology;  Laterality: Left;    BRONCHOSCOPY N/A 2019    Procedure: BRONCHOSCOPY;  Surgeon: Sean Ruano MD;  Location: [address];  Service: General;  Laterality: N/A;    CARDIAC CATHETERIZATION      CATARACT EXTRACTION      cataract extraction left eye      cataracts      CAUDAL EPIDURAL STEROID INJECTION N/A 2019    Procedure: Injection-steroid-epidural-caudal;  Surgeon: Dave Bentley Jr., MD;  Location: [facility];  Service: Pain Management;  Laterality: N/A;     SECTION, LOW TRANSVERSE      COLONOSCOPY N/A 2019    Procedure: COLONOSCOPY;  Surgeon: Al Alaniz MD;  Location: [facility] ENDO ([address] [place] [address]);  Service: Endoscopy;  Laterality: N/A;    CORONARY ANGIOPLASTY      CYSTOGRAM N/A 2019    Procedure: CYSTOGRAM INTRAOP;  Surgeon: Robin Boyd MD;  Location: [facility] OR [address] [place] [address];  Service: Urology;  Laterality: N/A;  1 HOUR    CYSTOSCOPY W/ RETROGRADES Left 2019    Procedure: CYSTOSCOPY, WITH RETROGRADE PYELOGRAM;  Surgeon: Robin Boyd MD;  Location: [facility]  OR 2ND FLR;  Service: Urology;  Laterality: Left;  fluro    ESOPHAGOGASTRODUODENOSCOPY W/ PEG  5/2/2019    Procedure: EGD, WITH PEG TUBE INSERTION;  Surgeon: Sean Ruano MD;  Location: [address];  Service: General;;    EXCISION TURBINATE, SUBMUCOUS      FLEXIBLE SIGMOIDOSCOPY N/A 5/13/2019    Procedure: SIGMOIDOSCOPY, FLEXIBLE;  Surgeon: ALBERTO Amin MD;  [facility]  Service: Endoscopy;  Laterality: N/A;    FLEXIBLE SIGMOIDOSCOPY N/A 5/21/2019    Procedure: SIGMOIDOSCOPY, FLEXIBLE;  Surgeon: ALBERTO Amin MD;  [facility]  Service: Endoscopy;  Laterality: N/A;    FUSION OF LUMBAR SPINE BY ANTERIOR APPROACH Left 4/12/2019    Procedure: FUSION, SPINE, LUMBAR, ANTERIOR APPROACH Left L5-S1 Anterior to Psoa Interbody Fusion, L5-S1 Posterior Instrumentation;  Surgeon: Mk George MD;  Location: [address];  Service: Neurosurgery;  Laterality: Left;  Porcedure:  Left L5-S1 Anterior to Psoa Interbody Fusion, L5-S1 Posterior Instrumentation  Surgery Time: 4 Hrs  LOS: 2-3  Anesthesia: General   Blood: Type & Screen  R    HAND SURGERY Left     HAND SURGERY Right     torn ligament repair/ Dr. Yeboah    HYSTERECTOMY      left foot surgery      left wrist surgery      NASAL SEPTUM SURGERY  5/7/15    PERCUTANEOUS NEPHROSTOMY Left 4/21/2019    Procedure: Creation, Nephrostomy, Percutaneous;  Surgeon: Karina Surgeon;  Location: [facility];  Service: Anesthesiology;  Laterality: Left;    REPAIR OF URETER  4/12/2019    Procedure: REPAIR, URETER;  Surgeon: Mk George MD;  Location: [address];  Service: Neurosurgery;;    REPLACEMENT OF NEPHROSTOMY TUBE N/A 7/18/2019    Procedure: REPLACEMENT, NEPHROSTOMY TUBE;  Surgeon: [employer] Diagnostic Provider;  Location: [address];  Service: Anesthesiology;  Laterality: N/A;  188    REPLACEMENT OF NEPHROSTOMY TUBE N/A 7/24/2019    Procedure: REPLACEMENT, NEPHROSTOMY TUBE;  Surgeon: [employer] Diagnostic  Provider;  Location: [facility] OR 2ND FLR;  Service: Anesthesiology;  Laterality: N/A;  188    REPLACEMENT OF NEPHROSTOMY TUBE N/A 10/7/2019    Procedure: REPLACEMENT, NEPHROSTOMY TUBE;  Surgeon: [facility] Diagnostic Provider;  Location: [facility] OR 2ND FLR;  Service: Anesthesiology;  Laterality: N/A;  189    REPLACEMENT OF NEPHROSTOMY TUBE N/A 11/25/2019    Procedure: REPLACEMENT, NEPHROSTOMY TUBE;  Surgeon: [facility] Diagnostic Provider;  Location: [facility] OR 2ND FLR;  Service: Anesthesiology;  Laterality: N/A;  Room 188/Bessy    rt elbow surgery      S/P LAD COATED STENT  05/14/2010    6 total     S/P OM1 STENT  08/2003    SINUS SURGERY      F.E.S.S.    TRACHEOSTOMY N/A 5/2/2019    Procedure: CREATION, TRACHEOSTOMY;  Surgeon: Sean Ruano MD;  [facility];  Service: General;  Laterality: N/A;    TUBAL LIGATION         Social History     Socioeconomic History    Marital status:      Spouse name: Shamir    Number of children: 2    Years of education: Not on file    Highest education level: Not on file   Occupational History    Occupation: cafeteria     Employer: [employer]     Employer: [employer]     Employer: [employer]   Social Needs    Financial resource strain: Not on file    Food insecurity:     Worry: Not on file     Inability: Not on file    Transportation needs:     Medical: Not on file     Non-medical: Not on file   Tobacco Use    Smoking status: Never Smoker    Smokeless tobacco: Never Used   Substance and Sexual Activity    Alcohol use: No     Alcohol/week: 0.0 standard drinks    Drug use: No    Sexual activity: Yes     Partners: Male     Birth control/protection: Post-menopausal     Comment:    Lifestyle    Physical activity:     Days per week: Not on file     Minutes per session: Not on file    Stress: Not on file   Relationships    Social connections:     Talks on phone: Not on file     Gets together: Not on file     Attends  Buddhism service: Not on file     Active member of club or organization: Not on file     Attends meetings of clubs or organizations: Not on file     Relationship status: Not on file   Other Topics Concern    Are you pregnant or think you may be? No    Breast-feeding No   Social History Narrative    . 2 children.        OBJECTIVE:     Vital Signs Range (Last 24H):         Significant Labs:  Lab Results   Component Value Date    WBC 7.14 02/12/2020    HGB 10.6 (L) 02/12/2020    HCT 36.8 (L) 02/12/2020     (H) 02/12/2020    CHOL 169 08/22/2019    TRIG 122 08/22/2019    HDL 37 (L) 08/22/2019    ALT 15 02/12/2020    AST 17 02/12/2020     02/12/2020    K 4.0 02/12/2020     02/12/2020    CREATININE 1.0 02/12/2020    BUN 19 02/12/2020    CO2 26 02/12/2020    TSH 2.852 08/22/2019    INR 0.9 11/25/2019    GLUF 217 (H) 09/15/2014    HGBA1C 9.0 (H) 02/12/2020       Diagnostic Studies: No relevant studies.    EKG:   Results for orders placed or performed in visit on 02/12/20   IN OFFICE EKG 12-LEAD (to [place])    Collection Time: 02/12/20  1:50 PM    Narrative    Test Reason : I25.10,I50.42,I50.30,I65.23,E11.69,E78.5,E11.59,I10,I25.2,Z93.6~    Vent. Rate : 074 BPM     Atrial Rate : 074 BPM     P-R Int : 168 ms          QRS Dur : 082 ms      QT Int : 386 ms       P-R-T Axes : 055 -23 092 degrees     QTc Int : 428 ms    Normal sinus rhythm  Nonspecific T wave abnormality  Abnormal ECG  When compared with ECG of 28-OCT-2019 18:31,  Vent. rate has decreased BY  36 BPM  Borderline criteria for Anterior infarct are no longer Present  Confirmed by Svitlana Palumbo MD ([id number]) on 2/12/2020 4:35:49 PM    Referred By:  eliezer           Confirmed By:Svitlana Palumbo MD       2D ECHO:  TTE:  Results for orders placed or performed during the hospital encounter of 06/06/19   Transthoracic echo (TTE) 2D with Color Flow   Result Value Ref Range    STJ 2.21 cm    AV mean gradient 2.71 mmHg    Ao peak oleksandr 1.08 m/s    Ao  VTI 18.40 cm    IVS 0.66 0.6 - 1.1 cm    LA size 3.97 cm    Left Atrium Major Axis 4.62 cm    Left Atrium Minor Axis 4.50 cm    LVIDD 5.37 3.5 - 6.0 cm    LVIDS 4.40 (A) 2.1 - 4.0 cm    LVOT diameter 1.99 cm    LVOT peak VTI 14.61 cm    PW 0.58 (A) 0.6 - 1.1 cm    MV Peak A Canelo 0.56 m/s    E wave decelartion time 71.55 msec    MV Peak E Canelo 1.09 m/s    PV Peak D Canelo 0.76 m/s    PV Peak S Canelo 0.31 m/s    RA Major Axis 3.28 cm    RA Width 3.37 cm    RVDD 2.85 cm    Sinus 3.01 cm    TAPSE 1.56 cm    TDI LATERAL 0.05     TDI SEPTAL 0.04     LA WIDTH 4.16 cm    LV Diastolic Volume 139.65 mL    LV Systolic Volume 87.65 mL    LVOT peak canelo [id number] m/s    LV LATERAL E/E' RATIO 21.80     LV SEPTAL E/E' RATIO 27.25     FS 18 %    LA volume 64.00 cm3    LV mass 112.05 g    Left Ventricle Relative Wall Thickness 0.22 cm    AV valve area 2.47 cm2    AV Velocity Ratio 0.74     AV index (prosthetic) 0.79     E/A ratio 1.95     Mean e' 0.05     Pulm vein S/D ratio 0.41     LVOT area 3.11 cm2    LVOT stroke volume 45.42 cm3    AV peak gradient 4.67 mmHg    E/E' ratio 24.22     LV Systolic Volume Index 49.5 mL/m2    LV Diastolic Volume Index 78.89 mL/m2    LA Volume Index 36.2 mL/m2    LV Mass Index 63.3 g/m2    BSA 1.79 m2    QEF 38 %    Narrative    · Mild left ventricular enlargement.  · Mildly decreased left ventricular systolic function. The estimated   ejection fraction is 40-45%, quantitiative LVEF 38-40%  · Grade II (moderate) left ventricular diastolic dysfunction consistent   with pseudonormalization.  · Elevated left atrial pressure.  · Mild-moderate left atrial enlargement.  · Local segmental wall motion abnormalities.  · Mechanically ventilated; cannot use inferior caval vein diameter to   estimate central venous pressure.  · Normal right ventricular systolic function.  · Moderate mitral regurgitation.          LUNA:  No results found. However, due to the size of the patient record, not all encounters were  searched. Please check Results Review for a complete set of results.    ASSESSMENT/PLAN:                                                                                                                  Anesthesia Evaluation    I have reviewed the Patient Summary Reports.    I have reviewed the Nursing Notes.   I have reviewed the Medications.     Review of Systems  Anesthesia Hx:  No problems with previous Anesthesia  History of prior surgery of interest to airway management or planning: Denies Family Hx of Anesthesia complications.   Denies Personal Hx of Anesthesia complications.   Hematology/Oncology:  Hematology Normal        EENT/Dental:EENT/Dental Normal   Cardiovascular:   Hypertension Past MI CAD  CABG/stent  CHF hyperlipidemia Carotid stenosis  Hx of DVTs   Pulmonary:   Asthma Sleep Apnea    Renal/:  Renal/ Normal     Hepatic/GI:  Hepatic/GI Normal    Musculoskeletal:   Arthritis     Neurological:  Neurology Normal    Endocrine:   Diabetes    Psych:  Psychiatric Normal              Anesthesia Plan  Type of Anesthesia, risks & benefits discussed:  Anesthesia Type:  general  Patient's Preference:   Intra-op Monitoring Plan: standard ASA monitors  Intra-op Monitoring Plan Comments:   Post Op Pain Control Plan: multimodal analgesia, IV/PO Opioids PRN and per primary service following discharge from PACU  Post Op Pain Control Plan Comments:   Induction:   IV  Beta Blocker:  Patient is on a Beta-Blocker and has received one dose within the past 24 hours (No further documentation required).       Informed Consent: Patient understands risks and agrees with Anesthesia plan.  Questions answered. Anesthesia consent signed with patient.  ASA Score: 3     Day of Surgery Review of History & Physical:    H&P update referred to the surgeon.         Ready For Surgery From Anesthesia Perspective.

## 2020-02-18 NOTE — PROGRESS NOTES
DIABETES EDUCATOR NOTE   Return of the Freestyle Mihaela Pro Sensor and Patient Log.    Patient returned to clinic today to return Glucose Sensor and signed patient log form used in [facility] procedure.    The CGMS Sensor will be scanned and downloaded. All reports will be imported into the patient's electronic medical record.    Endocrine Provider will complete data interpretation and make recommendations; will forward recommendations to the ordering provider for follow up with patient.

## 2020-02-19 ENCOUNTER — HOSPITAL ENCOUNTER (INPATIENT)
Facility: HOSPITAL | Age: 60
LOS: 2 days | Discharge: HOME | DRG: 654 | End: 2020-02-21
Attending: UROLOGY | Admitting: UROLOGY
Payer: COMMERCIAL

## 2020-02-19 ENCOUNTER — ANESTHESIA (OUTPATIENT)
Dept: SURGERY | Facility: HOSPITAL | Age: 60
DRG: 654 | End: 2020-02-19
Payer: COMMERCIAL

## 2020-02-19 DIAGNOSIS — S37.10XA LEFT URETERAL INJURY: Primary | ICD-10-CM

## 2020-02-19 LAB
ANION GAP SERPL CALC-SCNC: 15 MMOL/L (ref 8–16)
BASOPHILS # BLD AUTO: 0.04 K/UL (ref 0–0.2)
BASOPHILS NFR BLD: 0.3 % (ref 0–1.9)
BUN SERPL-MCNC: 21 MG/DL (ref 6–20)
CALCIUM SERPL-MCNC: 8.2 MG/DL (ref 8.7–10.5)
CHLORIDE SERPL-SCNC: 110 MMOL/L (ref 95–110)
CO2 SERPL-SCNC: 20 MMOL/L (ref 23–29)
CREAT SERPL-MCNC: 1 MG/DL (ref 0.5–1.4)
DIFFERENTIAL METHOD: ABNORMAL
EOSINOPHIL # BLD AUTO: 0 K/UL (ref 0–0.5)
EOSINOPHIL NFR BLD: 0.1 % (ref 0–8)
ERYTHROCYTE [DISTWIDTH] IN BLOOD BY AUTOMATED COUNT: 13.9 % (ref 11.5–14.5)
EST. GFR  (AFRICAN AMERICAN): >60 ML/MIN/1.73 M^2
EST. GFR  (NON AFRICAN AMERICAN): >60 ML/MIN/1.73 M^2
GLUCOSE SERPL-MCNC: 241 MG/DL (ref 70–110)
HCT VFR BLD AUTO: 31.8 % (ref 37–48.5)
HGB BLD-MCNC: 9.4 G/DL (ref 12–16)
IMM GRANULOCYTES # BLD AUTO: 0.06 K/UL (ref 0–0.04)
IMM GRANULOCYTES NFR BLD AUTO: 0.4 % (ref 0–0.5)
LYMPHOCYTES # BLD AUTO: 1.5 K/UL (ref 1–4.8)
LYMPHOCYTES NFR BLD: 10.9 % (ref 18–48)
MCH RBC QN AUTO: 26 PG (ref 27–31)
MCHC RBC AUTO-ENTMCNC: 29.6 G/DL (ref 32–36)
MCV RBC AUTO: 88 FL (ref 82–98)
MONOCYTES # BLD AUTO: 0.6 K/UL (ref 0.3–1)
MONOCYTES NFR BLD: 4.3 % (ref 4–15)
NEUTROPHILS # BLD AUTO: 11.7 K/UL (ref 1.8–7.7)
NEUTROPHILS NFR BLD: 84 % (ref 38–73)
NRBC BLD-RTO: 0 /100 WBC
PLATELET # BLD AUTO: 305 K/UL (ref 150–350)
PMV BLD AUTO: 11.3 FL (ref 9.2–12.9)
POCT GLUCOSE: 219 MG/DL (ref 70–110)
POCT GLUCOSE: 248 MG/DL (ref 70–110)
POCT GLUCOSE: 248 MG/DL (ref 70–110)
POTASSIUM SERPL-SCNC: 3.1 MMOL/L (ref 3.5–5.1)
RBC # BLD AUTO: 3.61 M/UL (ref 4–5.4)
SODIUM SERPL-SCNC: 145 MMOL/L (ref 136–145)
WBC # BLD AUTO: 13.94 K/UL (ref 3.9–12.7)

## 2020-02-19 PROCEDURE — 25000003 PHARM REV CODE 250: Performed by: STUDENT IN AN ORGANIZED HEALTH CARE EDUCATION/TRAINING PROGRAM

## 2020-02-19 PROCEDURE — 71000016 HC POSTOP RECOV ADDL HR: Performed by: UROLOGY

## 2020-02-19 PROCEDURE — D9220A PRA ANESTHESIA: ICD-10-PCS | Mod: ,,, | Performed by: ANESTHESIOLOGY

## 2020-02-19 PROCEDURE — 88305 TISSUE EXAM BY PATHOLOGIST: CPT | Performed by: PATHOLOGY

## 2020-02-19 PROCEDURE — 50785 REIMPLANT URETER IN BLADDER: CPT | Mod: 22,LT,, | Performed by: UROLOGY

## 2020-02-19 PROCEDURE — C2617 STENT, NON-COR, TEM W/O DEL: HCPCS | Performed by: UROLOGY

## 2020-02-19 PROCEDURE — 36000709 HC OR TIME LEV III EA ADD 15 MIN: Performed by: UROLOGY

## 2020-02-19 PROCEDURE — S0077 INJECTION, CLINDAMYCIN PHOSP: HCPCS | Performed by: STUDENT IN AN ORGANIZED HEALTH CARE EDUCATION/TRAINING PROGRAM

## 2020-02-19 PROCEDURE — 63600175 PHARM REV CODE 636 W HCPCS: Performed by: STUDENT IN AN ORGANIZED HEALTH CARE EDUCATION/TRAINING PROGRAM

## 2020-02-19 PROCEDURE — 37000009 HC ANESTHESIA EA ADD 15 MINS: Performed by: UROLOGY

## 2020-02-19 PROCEDURE — 71000015 HC POSTOP RECOV 1ST HR: Performed by: UROLOGY

## 2020-02-19 PROCEDURE — C1769 GUIDE WIRE: HCPCS | Performed by: UROLOGY

## 2020-02-19 PROCEDURE — 27201037 HC PRESSURE MONITORING SET UP

## 2020-02-19 PROCEDURE — 50785 PR REIMPLANT URETER,VESICOPSOAS HITCH: ICD-10-PCS | Mod: 22,LT,, | Performed by: UROLOGY

## 2020-02-19 PROCEDURE — 82962 GLUCOSE BLOOD TEST: CPT | Performed by: UROLOGY

## 2020-02-19 PROCEDURE — 85025 COMPLETE CBC W/AUTO DIFF WBC: CPT

## 2020-02-19 PROCEDURE — 12000002 HC ACUTE/MED SURGE SEMI-PRIVATE ROOM

## 2020-02-19 PROCEDURE — 63600175 PHARM REV CODE 636 W HCPCS: Performed by: UROLOGY

## 2020-02-19 PROCEDURE — C1758 CATHETER, URETERAL: HCPCS | Performed by: UROLOGY

## 2020-02-19 PROCEDURE — D9220A PRA ANESTHESIA: Mod: ,,, | Performed by: ANESTHESIOLOGY

## 2020-02-19 PROCEDURE — 25000003 PHARM REV CODE 250: Performed by: UROLOGY

## 2020-02-19 PROCEDURE — 80048 BASIC METABOLIC PNL TOTAL CA: CPT

## 2020-02-19 PROCEDURE — 94761 N-INVAS EAR/PLS OXIMETRY MLT: CPT

## 2020-02-19 PROCEDURE — 88305 TISSUE EXAM BY PATHOLOGIST: CPT | Mod: 26,,, | Performed by: PATHOLOGY

## 2020-02-19 PROCEDURE — 71000033 HC RECOVERY, INTIAL HOUR: Performed by: UROLOGY

## 2020-02-19 PROCEDURE — 52332 PR CYSTOSCOPY,INSERT URETERAL STENT: ICD-10-PCS | Mod: 51,LT,, | Performed by: UROLOGY

## 2020-02-19 PROCEDURE — 52332 CYSTOSCOPY AND TREATMENT: CPT | Mod: 51,LT,, | Performed by: UROLOGY

## 2020-02-19 PROCEDURE — 37000008 HC ANESTHESIA 1ST 15 MINUTES: Performed by: UROLOGY

## 2020-02-19 PROCEDURE — 36000708 HC OR TIME LEV III 1ST 15 MIN: Performed by: UROLOGY

## 2020-02-19 PROCEDURE — 88305 TISSUE EXAM BY PATHOLOGIST: ICD-10-PCS | Mod: 26,,, | Performed by: PATHOLOGY

## 2020-02-19 DEVICE — STENT URETERAL UNIV 6FR 24CM: Type: IMPLANTABLE DEVICE | Site: URETER | Status: FUNCTIONAL

## 2020-02-19 RX ORDER — SODIUM CHLORIDE 0.9 % (FLUSH) 0.9 %
10 SYRINGE (ML) INJECTION
Status: DISCONTINUED | OUTPATIENT
Start: 2020-02-19 | End: 2020-02-21 | Stop reason: HOSPADM

## 2020-02-19 RX ORDER — NITROGLYCERIN 0.4 MG/1
0.4 TABLET SUBLINGUAL EVERY 5 MIN PRN
Status: DISCONTINUED | OUTPATIENT
Start: 2020-02-19 | End: 2020-02-21 | Stop reason: HOSPADM

## 2020-02-19 RX ORDER — KETAMINE HCL IN 0.9 % NACL 50 MG/5 ML
SYRINGE (ML) INTRAVENOUS
Status: DISCONTINUED | OUTPATIENT
Start: 2020-02-19 | End: 2020-02-19

## 2020-02-19 RX ORDER — SODIUM CHLORIDE 9 MG/ML
INJECTION, SOLUTION INTRAVENOUS CONTINUOUS
Status: DISCONTINUED | OUTPATIENT
Start: 2020-02-19 | End: 2020-02-20

## 2020-02-19 RX ORDER — AMLODIPINE BESYLATE 5 MG/1
10 TABLET ORAL DAILY
Status: DISCONTINUED | OUTPATIENT
Start: 2020-02-20 | End: 2020-02-21 | Stop reason: HOSPADM

## 2020-02-19 RX ORDER — ACETAMINOPHEN 500 MG
1000 TABLET ORAL EVERY 6 HOURS
Status: DISCONTINUED | OUTPATIENT
Start: 2020-02-19 | End: 2020-02-21 | Stop reason: HOSPADM

## 2020-02-19 RX ORDER — PANTOPRAZOLE SODIUM 40 MG/1
40 TABLET, DELAYED RELEASE ORAL DAILY
Status: DISCONTINUED | OUTPATIENT
Start: 2020-02-19 | End: 2020-02-21 | Stop reason: HOSPADM

## 2020-02-19 RX ORDER — EPHEDRINE SULFATE 50 MG/ML
INJECTION, SOLUTION INTRAVENOUS
Status: DISCONTINUED | OUTPATIENT
Start: 2020-02-19 | End: 2020-02-19

## 2020-02-19 RX ORDER — LOSARTAN POTASSIUM 50 MG/1
100 TABLET ORAL DAILY
Status: DISCONTINUED | OUTPATIENT
Start: 2020-02-20 | End: 2020-02-21 | Stop reason: HOSPADM

## 2020-02-19 RX ORDER — GLUCAGON 1 MG
1 KIT INJECTION
Status: DISCONTINUED | OUTPATIENT
Start: 2020-02-19 | End: 2020-02-21 | Stop reason: HOSPADM

## 2020-02-19 RX ORDER — FENTANYL CITRATE 50 UG/ML
INJECTION, SOLUTION INTRAMUSCULAR; INTRAVENOUS
Status: DISCONTINUED | OUTPATIENT
Start: 2020-02-19 | End: 2020-02-19

## 2020-02-19 RX ORDER — LIDOCAINE HYDROCHLORIDE 10 MG/ML
1 INJECTION INFILTRATION; PERINEURAL ONCE
Status: COMPLETED | OUTPATIENT
Start: 2020-02-19 | End: 2020-02-19

## 2020-02-19 RX ORDER — LIDOCAINE HCL/PF 100 MG/5ML
SYRINGE (ML) INTRAVENOUS
Status: DISCONTINUED | OUTPATIENT
Start: 2020-02-19 | End: 2020-02-19

## 2020-02-19 RX ORDER — METOPROLOL TARTRATE 25 MG/1
25 TABLET, FILM COATED ORAL 2 TIMES DAILY
Status: DISCONTINUED | OUTPATIENT
Start: 2020-02-19 | End: 2020-02-21 | Stop reason: HOSPADM

## 2020-02-19 RX ORDER — ALBUTEROL SULFATE 90 UG/1
2 AEROSOL, METERED RESPIRATORY (INHALATION) EVERY 6 HOURS PRN
Status: DISCONTINUED | OUTPATIENT
Start: 2020-02-19 | End: 2020-02-21 | Stop reason: HOSPADM

## 2020-02-19 RX ORDER — HYDROMORPHONE HYDROCHLORIDE 1 MG/ML
0.2 INJECTION, SOLUTION INTRAMUSCULAR; INTRAVENOUS; SUBCUTANEOUS EVERY 5 MIN PRN
Status: DISCONTINUED | OUTPATIENT
Start: 2020-02-19 | End: 2020-02-19 | Stop reason: HOSPADM

## 2020-02-19 RX ORDER — DEXAMETHASONE SODIUM PHOSPHATE 4 MG/ML
INJECTION, SOLUTION INTRA-ARTICULAR; INTRALESIONAL; INTRAMUSCULAR; INTRAVENOUS; SOFT TISSUE
Status: DISCONTINUED | OUTPATIENT
Start: 2020-02-19 | End: 2020-02-19

## 2020-02-19 RX ORDER — KETOROLAC TROMETHAMINE 30 MG/ML
15 INJECTION, SOLUTION INTRAMUSCULAR; INTRAVENOUS EVERY 8 HOURS
Status: DISCONTINUED | OUTPATIENT
Start: 2020-02-19 | End: 2020-02-21 | Stop reason: HOSPADM

## 2020-02-19 RX ORDER — OXYCODONE HYDROCHLORIDE 5 MG/1
5 TABLET ORAL EVERY 4 HOURS PRN
Status: DISCONTINUED | OUTPATIENT
Start: 2020-02-19 | End: 2020-02-21 | Stop reason: HOSPADM

## 2020-02-19 RX ORDER — ONDANSETRON 2 MG/ML
INJECTION INTRAMUSCULAR; INTRAVENOUS
Status: DISCONTINUED | OUTPATIENT
Start: 2020-02-19 | End: 2020-02-19

## 2020-02-19 RX ORDER — HEPARIN SODIUM 5000 [USP'U]/ML
5000 INJECTION, SOLUTION INTRAVENOUS; SUBCUTANEOUS ONCE
Status: COMPLETED | OUTPATIENT
Start: 2020-02-19 | End: 2020-02-19

## 2020-02-19 RX ORDER — FUROSEMIDE 40 MG/1
40 TABLET ORAL DAILY
Status: DISCONTINUED | OUTPATIENT
Start: 2020-02-20 | End: 2020-02-21 | Stop reason: HOSPADM

## 2020-02-19 RX ORDER — IBUPROFEN 200 MG
16 TABLET ORAL
Status: DISCONTINUED | OUTPATIENT
Start: 2020-02-19 | End: 2020-02-21 | Stop reason: HOSPADM

## 2020-02-19 RX ORDER — SODIUM CHLORIDE 9 MG/ML
INJECTION, SOLUTION INTRAVENOUS CONTINUOUS
Status: DISCONTINUED | OUTPATIENT
Start: 2020-02-19 | End: 2020-02-19

## 2020-02-19 RX ORDER — FLUCONAZOLE 2 MG/ML
400 INJECTION, SOLUTION INTRAVENOUS
Status: COMPLETED | OUTPATIENT
Start: 2020-02-20 | End: 2020-02-20

## 2020-02-19 RX ORDER — GLYCOPYRROLATE 0.2 MG/ML
INJECTION INTRAMUSCULAR; INTRAVENOUS
Status: DISCONTINUED | OUTPATIENT
Start: 2020-02-19 | End: 2020-02-19

## 2020-02-19 RX ORDER — OXYBUTYNIN CHLORIDE 10 MG/1
10 TABLET, EXTENDED RELEASE ORAL EVERY MORNING
Status: DISCONTINUED | OUTPATIENT
Start: 2020-02-19 | End: 2020-02-21 | Stop reason: HOSPADM

## 2020-02-19 RX ORDER — FENTANYL CITRATE 50 UG/ML
25 INJECTION, SOLUTION INTRAMUSCULAR; INTRAVENOUS EVERY 5 MIN PRN
Status: DISCONTINUED | OUTPATIENT
Start: 2020-02-19 | End: 2020-02-19 | Stop reason: HOSPADM

## 2020-02-19 RX ORDER — DIPHENHYDRAMINE HYDROCHLORIDE 50 MG/ML
25 INJECTION INTRAMUSCULAR; INTRAVENOUS EVERY 6 HOURS PRN
Status: DISCONTINUED | OUTPATIENT
Start: 2020-02-19 | End: 2020-02-19 | Stop reason: HOSPADM

## 2020-02-19 RX ORDER — CLINDAMYCIN PHOSPHATE 900 MG/50ML
900 INJECTION, SOLUTION INTRAVENOUS
Status: COMPLETED | OUTPATIENT
Start: 2020-02-19 | End: 2020-02-19

## 2020-02-19 RX ORDER — PROPOFOL 10 MG/ML
VIAL (ML) INTRAVENOUS
Status: DISCONTINUED | OUTPATIENT
Start: 2020-02-19 | End: 2020-02-19

## 2020-02-19 RX ORDER — ROCURONIUM BROMIDE 10 MG/ML
INJECTION, SOLUTION INTRAVENOUS
Status: DISCONTINUED | OUTPATIENT
Start: 2020-02-19 | End: 2020-02-19

## 2020-02-19 RX ORDER — FLUCONAZOLE 2 MG/ML
400 INJECTION, SOLUTION INTRAVENOUS
Status: DISCONTINUED | OUTPATIENT
Start: 2020-02-19 | End: 2020-02-19

## 2020-02-19 RX ORDER — ACETAMINOPHEN 10 MG/ML
INJECTION, SOLUTION INTRAVENOUS
Status: DISCONTINUED | OUTPATIENT
Start: 2020-02-19 | End: 2020-02-19

## 2020-02-19 RX ORDER — IBUPROFEN 200 MG
24 TABLET ORAL
Status: DISCONTINUED | OUTPATIENT
Start: 2020-02-19 | End: 2020-02-21 | Stop reason: HOSPADM

## 2020-02-19 RX ORDER — METHOCARBAMOL 500 MG/1
1000 TABLET, FILM COATED ORAL EVERY 6 HOURS PRN
Status: DISCONTINUED | OUTPATIENT
Start: 2020-02-19 | End: 2020-02-21 | Stop reason: HOSPADM

## 2020-02-19 RX ORDER — HEPARIN SODIUM 5000 [USP'U]/ML
5000 INJECTION, SOLUTION INTRAVENOUS; SUBCUTANEOUS EVERY 8 HOURS
Status: DISCONTINUED | OUTPATIENT
Start: 2020-02-19 | End: 2020-02-20

## 2020-02-19 RX ORDER — MIDAZOLAM HYDROCHLORIDE 1 MG/ML
INJECTION, SOLUTION INTRAMUSCULAR; INTRAVENOUS
Status: DISCONTINUED | OUTPATIENT
Start: 2020-02-19 | End: 2020-02-19

## 2020-02-19 RX ORDER — GABAPENTIN 300 MG/1
300 CAPSULE ORAL 2 TIMES DAILY
Status: DISCONTINUED | OUTPATIENT
Start: 2020-02-19 | End: 2020-02-21 | Stop reason: HOSPADM

## 2020-02-19 RX ORDER — ZOLPIDEM TARTRATE 5 MG/1
5 TABLET ORAL NIGHTLY PRN
Status: DISCONTINUED | OUTPATIENT
Start: 2020-02-19 | End: 2020-02-21 | Stop reason: HOSPADM

## 2020-02-19 RX ORDER — NAPROXEN SODIUM 220 MG/1
81 TABLET, FILM COATED ORAL DAILY
Status: DISCONTINUED | OUTPATIENT
Start: 2020-02-20 | End: 2020-02-21 | Stop reason: HOSPADM

## 2020-02-19 RX ORDER — INSULIN ASPART 100 [IU]/ML
1-10 INJECTION, SOLUTION INTRAVENOUS; SUBCUTANEOUS
Status: DISCONTINUED | OUTPATIENT
Start: 2020-02-19 | End: 2020-02-21 | Stop reason: HOSPADM

## 2020-02-19 RX ORDER — POLYETHYLENE GLYCOL 3350 17 G/17G
17 POWDER, FOR SOLUTION ORAL DAILY
Status: DISCONTINUED | OUTPATIENT
Start: 2020-02-20 | End: 2020-02-21 | Stop reason: HOSPADM

## 2020-02-19 RX ORDER — ONDANSETRON 2 MG/ML
4 INJECTION INTRAMUSCULAR; INTRAVENOUS ONCE AS NEEDED
Status: DISCONTINUED | OUTPATIENT
Start: 2020-02-19 | End: 2020-02-19 | Stop reason: HOSPADM

## 2020-02-19 RX ORDER — ATORVASTATIN CALCIUM 20 MG/1
80 TABLET, FILM COATED ORAL DAILY
Status: DISCONTINUED | OUTPATIENT
Start: 2020-02-20 | End: 2020-02-21 | Stop reason: HOSPADM

## 2020-02-19 RX ORDER — ESCITALOPRAM OXALATE 10 MG/1
10 TABLET ORAL DAILY
Status: DISCONTINUED | OUTPATIENT
Start: 2020-02-20 | End: 2020-02-21 | Stop reason: HOSPADM

## 2020-02-19 RX ORDER — PREGABALIN 50 MG/1
150 CAPSULE ORAL NIGHTLY
Status: DISCONTINUED | OUTPATIENT
Start: 2020-02-19 | End: 2020-02-19

## 2020-02-19 RX ORDER — NEOSTIGMINE METHYLSULFATE 1 MG/ML
INJECTION, SOLUTION INTRAVENOUS
Status: DISCONTINUED | OUTPATIENT
Start: 2020-02-19 | End: 2020-02-19

## 2020-02-19 RX ORDER — OXYCODONE HYDROCHLORIDE 10 MG/1
10 TABLET ORAL EVERY 4 HOURS PRN
Status: DISCONTINUED | OUTPATIENT
Start: 2020-02-19 | End: 2020-02-21 | Stop reason: HOSPADM

## 2020-02-19 RX ADMIN — SODIUM CHLORIDE, SODIUM GLUCONATE, SODIUM ACETATE, POTASSIUM CHLORIDE, MAGNESIUM CHLORIDE, SODIUM PHOSPHATE, DIBASIC, AND POTASSIUM PHOSPHATE: .53; .5; .37; .037; .03; .012; .00082 INJECTION, SOLUTION INTRAVENOUS at 10:02

## 2020-02-19 RX ADMIN — DEXAMETHASONE SODIUM PHOSPHATE 8 MG: 4 INJECTION, SOLUTION INTRAMUSCULAR; INTRAVENOUS at 10:02

## 2020-02-19 RX ADMIN — LIDOCAINE HYDROCHLORIDE 0.2 ML: 10 INJECTION, SOLUTION EPIDURAL; INFILTRATION; INTRACAUDAL; PERINEURAL at 09:02

## 2020-02-19 RX ADMIN — PROPOFOL 50 MG: 10 INJECTION, EMULSION INTRAVENOUS at 10:02

## 2020-02-19 RX ADMIN — ACETAMINOPHEN 1000 MG: 10 INJECTION, SOLUTION INTRAVENOUS at 02:02

## 2020-02-19 RX ADMIN — Medication 10 MG: at 12:02

## 2020-02-19 RX ADMIN — KETOROLAC TROMETHAMINE 15 MG: 30 INJECTION, SOLUTION INTRAMUSCULAR; INTRAVENOUS at 09:02

## 2020-02-19 RX ADMIN — EPHEDRINE SULFATE 5 MG: 50 INJECTION, SOLUTION INTRAMUSCULAR; INTRAVENOUS; SUBCUTANEOUS at 11:02

## 2020-02-19 RX ADMIN — FENTANYL CITRATE 25 MCG: 50 INJECTION, SOLUTION INTRAMUSCULAR; INTRAVENOUS at 10:02

## 2020-02-19 RX ADMIN — SODIUM CHLORIDE: 0.9 INJECTION, SOLUTION INTRAVENOUS at 09:02

## 2020-02-19 RX ADMIN — INSULIN ASPART 2 UNITS: 100 INJECTION, SOLUTION INTRAVENOUS; SUBCUTANEOUS at 04:02

## 2020-02-19 RX ADMIN — ACETAMINOPHEN 1000 MG: 500 TABLET ORAL at 09:02

## 2020-02-19 RX ADMIN — OXYBUTYNIN 10 MG: 10 TABLET, FILM COATED, EXTENDED RELEASE ORAL at 06:02

## 2020-02-19 RX ADMIN — PROPOFOL 30 MG: 10 INJECTION, EMULSION INTRAVENOUS at 10:02

## 2020-02-19 RX ADMIN — ROCURONIUM BROMIDE 50 MG: 10 INJECTION, SOLUTION INTRAVENOUS at 10:02

## 2020-02-19 RX ADMIN — Medication 20 MG: at 10:02

## 2020-02-19 RX ADMIN — MIDAZOLAM HYDROCHLORIDE 2 MG: 1 INJECTION, SOLUTION INTRAMUSCULAR; INTRAVENOUS at 10:02

## 2020-02-19 RX ADMIN — ROCURONIUM BROMIDE 20 MG: 10 INJECTION, SOLUTION INTRAVENOUS at 11:02

## 2020-02-19 RX ADMIN — EPHEDRINE SULFATE 5 MG: 50 INJECTION, SOLUTION INTRAMUSCULAR; INTRAVENOUS; SUBCUTANEOUS at 01:02

## 2020-02-19 RX ADMIN — INSULIN ASPART 2 UNITS: 100 INJECTION, SOLUTION INTRAVENOUS; SUBCUTANEOUS at 10:02

## 2020-02-19 RX ADMIN — METOPROLOL TARTRATE 25 MG: 25 TABLET ORAL at 09:02

## 2020-02-19 RX ADMIN — METHOCARBAMOL TABLETS 1000 MG: 500 TABLET, COATED ORAL at 06:02

## 2020-02-19 RX ADMIN — FENTANYL CITRATE 75 MCG: 50 INJECTION, SOLUTION INTRAMUSCULAR; INTRAVENOUS at 10:02

## 2020-02-19 RX ADMIN — PANTOPRAZOLE SODIUM 40 MG: 40 TABLET, DELAYED RELEASE ORAL at 04:02

## 2020-02-19 RX ADMIN — POTASSIUM BICARBONATE 50 MEQ: 978 TABLET, EFFERVESCENT ORAL at 10:02

## 2020-02-19 RX ADMIN — ONDANSETRON 4 MG: 2 INJECTION INTRAMUSCULAR; INTRAVENOUS at 03:02

## 2020-02-19 RX ADMIN — CLINDAMYCIN IN 5 PERCENT DEXTROSE 900 MG: 18 INJECTION, SOLUTION INTRAVENOUS at 10:02

## 2020-02-19 RX ADMIN — SODIUM CHLORIDE, SODIUM GLUCONATE, SODIUM ACETATE, POTASSIUM CHLORIDE, MAGNESIUM CHLORIDE, SODIUM PHOSPHATE, DIBASIC, AND POTASSIUM PHOSPHATE: .53; .5; .37; .037; .03; .012; .00082 INJECTION, SOLUTION INTRAVENOUS at 12:02

## 2020-02-19 RX ADMIN — OXYCODONE HYDROCHLORIDE 5 MG: 5 TABLET ORAL at 04:02

## 2020-02-19 RX ADMIN — GENTAMICIN SULFATE 240 MG: 40 INJECTION, SOLUTION INTRAMUSCULAR; INTRAVENOUS at 10:02

## 2020-02-19 RX ADMIN — HEPARIN SODIUM 5000 UNITS: 5000 INJECTION, SOLUTION INTRAVENOUS; SUBCUTANEOUS at 09:02

## 2020-02-19 RX ADMIN — GLYCOPYRROLATE 0.6 MG: 0.2 INJECTION, SOLUTION INTRAMUSCULAR; INTRAVENOUS at 03:02

## 2020-02-19 RX ADMIN — PROPOFOL 20 MG: 10 INJECTION, EMULSION INTRAVENOUS at 03:02

## 2020-02-19 RX ADMIN — GABAPENTIN 300 MG: 300 CAPSULE ORAL at 09:02

## 2020-02-19 RX ADMIN — SODIUM CHLORIDE: 0.9 INJECTION, SOLUTION INTRAVENOUS at 04:02

## 2020-02-19 RX ADMIN — NEOSTIGMINE METHYLSULFATE 5 MG: 1 INJECTION INTRAVENOUS at 03:02

## 2020-02-19 NOTE — OP NOTE
[facility]  Operative Note    Date: 02/19/2020    Pre-Op Diagnosis:    1. Left ureteral injury during neurosurgical procedure  2. Sepsis secondary to breakdown of left ureteral repair  3. Intraabdominal abscess (drained)  4. S/P left nephrostomy tube placement    Post-Op Diagnosis: same    Procedure(s) Performed:   1.  Boari flap with left ureteral reimplant  2.  Left ureteral stent placement  3.  Psoas hitch  4.  Lysis of adhesions    22 Modifier: this case took 100% longer time than expected secondary to severe intraabdominal adhesions following abscess.     Specimen(s): left ureter    Staff Surgeon: Robin Boyd MD    Assistant Surgeon: Yulissa Salas MD; Hima Neumann MD    Anesthesia:  General endotracheal anesthesia    Indications: Mariann Huff is a 59 y.o. female with a hx of left ureteral injury during L5/S1 fusion. She underwent primary ureteroureterostomy at that time. 1 week later she presented septic with an intra-abdominal abscess secondary to breakdown of the repair. At that time she underwent ex lap with clipping of the ureter and neph tube placement. Her post op course was severely complicated with a prolonged ICU stay. She has now recovered from this and is ready to undergo repair of her left ureter.     Findings:    1.  Severe adhesions of small bowel and colon in left lower quadrant   2.  Ureter identified above the pelvic brim and tracked distally, at location of injury there was severe fibrosis and inability to dissect the ureter further, transected proximal to this  3.  Bladder identified and mobilized from superficial and peritoneal attachments  4.  Boari flap with psoas hitch performed with tension free anastomosis  5.  Leak test performed and confirmed negative     Estimated Blood Loss: 200 mL    Drains:   1.  6 Fr x 24 cm left ureteral stent  2.  16 Fr correa catheter  3.  15 mm robert drain    Procedure in detail:  After informed consent was obtained and all  questions were answered, the patient was brought to the operating suite and placed int he supine position.  General anesthesia was administered.  TEDs and SCDs were applied and working prior to induction of anesthesia.  That patient was then prepped and draped in the usual sterile fashion.  Time out was performed and preoperative antibiotics were confirmed.      A midline incision was made along the previous scar from the pubic bone to superior to the umbilicus with a 15 blade. The subcutaneous tissue was dissected with bovie electrocautery and the fascia was entered and incised along the length of our incision. The bookwalter was assembled and retraction optimized.     There was significant adhesions present within the left lower quadrant making bowel mobilization difficult. A lysis of adhesions was performed to mobilize the small bowel and left colon off the retroperitoneum. The gonadal vein was then identified. The ureter was seen just medial and posterior to this. The ureter was circumferentially isolated and a vessel loop was placed. The ureter was then tracked distally.  At the area of previous breakdown there was significant fibrosis and the ureter could not safely be tracked down into the pelvis. At this location the ureter was transected and a stay stitch was placed.     We then turned our attention to the bladder. The bladder was filled with 200 cc of saline. The peritoneal attachments were then mobilized bilaterally and an anterior bladder drop was performed. The superficial superior and posterior attachments were taken down bilaterally however the pedicles were left intact.     At this point we decided that we were unlikely to be able to get a tension free anastomosis by only performing a psoas hitch with reimplant. We therefore elected to proceed with Boari flap. Our flap was marked out over the bladder and the flap was incised with a broad base. A psoas hitch was then performed to the left psoas muscle  with 0 PDS with 2 interrupted stitches.     An incision was made in the superior aspect of the mucosa on our flap. The ureter was brought through this incision and spatulated anteriorly. A 3 point fixation was performed from the ureteral adventitia to the bladder. A tension free anastomosis was performed using 4-0 monocryl in a running fashion. A 6 Fr x 24 cm JJ ureteral stent was placed over a guide wire and the distal loop placed in the bladder. The flap was tubularized and the bladder closed vertically using 3-0 vicryl mucosal running layer and a 2-0 vicryl running second layer. This was leak tested at 120 cc and found to be negative.    A 15 mm robert drain was placed in the LLQ. This was secured with 2-0 nylon. The fascia was closed with 0 looped PDS. The skin was closed with 4-0 monocryl in a running subcuticular fashion. Dermanond was applied to the wound.     The patient tolerated the procedure well and was transferred to the PACU in stable condition.      Disposition:  The patient will be admitted for monitoring. Plan for stent to stay in for 6 weeks and correa for 2 weeks.    Yulissa Salas MD

## 2020-02-19 NOTE — ANESTHESIA PROCEDURE NOTES
Intubation  Performed by: Reina East MD  Authorized by: Hugh Moseley MD     Intubation:     Induction:  Intravenous    Intubated:  Postinduction    Mask Ventilation:  Easy with oral airway    Attempts:  3    Attempted By:  Resident anesthesiologist    Method of Intubation:  Direct    Blade:  Eufemia 3    Laryngeal View Grade: Grade IIb - only the arytenoids and epiglottis seen      Attempted By (2nd Attempt):  Resident anesthesiologist    Method of Intubation (2nd Attempt):  Direct    Blade (2nd Attempt):  Shahid 2    Laryngeal View Grade (2nd Attempt): Grade IIb - only the arytenoids and epiglottis seen      Attempted By (3rd Attempt):  Staff anesthesiologist    Method of Intubation (3rd Attempt):  Direct    Blade (3rd Attempt):  Shahid 2    Laryngeal View Grade (3rd Attempt): Grade IIa - cords partially seen      Difficult Airway Encountered?: No      Complications:  None    Airway Device:  Oral endotracheal tube    Airway Device Size:  7.0    Style/Cuff Inflation:  Cuffed    Inflation Amount (mL):  6    Tube secured:  23    Secured at:  The lips    Placement Verified By:  Capnometry    Complicating Factors:  Anterior larynx, small mouth and short neck    Findings Post-Intubation:  BS equal bilateral

## 2020-02-19 NOTE — INTERVAL H&P NOTE
The patient has been examined and the H&P has been reviewed:    I concur with the findings and no changes have occurred since H&P was written.    Anesthesia/Surgery risks, benefits and alternative options discussed and understood by patient/family.  Holding aspirin and plavix sine 2/12/20    Active Hospital Problems    Diagnosis  POA    Left ureteral injury [S37.10XA]  Yes      Resolved Hospital Problems   No resolved problems to display.

## 2020-02-19 NOTE — NURSING TRANSFER
Nursing Transfer Note      2/19/2020     Transfer To: [address]    Transfer via stretcher    Transfer with iv pole    Transported by pct    Medicines sent: [facility]    Chart send with patient: Yes

## 2020-02-19 NOTE — TRANSFER OF CARE
"Anesthesia Transfer of Care Note    Patient: Mariann Huff    Procedure(s) Performed: Procedure(s) (LRB):  LAPAROTOMY, EXPLORATORY (N/A)  LYSIS, ADHESIONS (N/A)  REIMPLANTATION, URETER WITH PSOAS HITCH (Left)  REPLACEMENT, NEPHROSTOMY TUBE removal removal (Right)  STENT, URETERAL    Patient location: PACU    Anesthesia Type: general    Transport from OR: Transported from OR on 6-10 L/min O2 by face mask with adequate spontaneous ventilation    Post pain: adequate analgesia    Post assessment: no apparent anesthetic complications and tolerated procedure well    Post vital signs: stable    Level of consciousness: awake and alert    Nausea/Vomiting: no nausea/vomiting    Complications: none          Last vitals:   Visit Vitals  BP (!) 193/81 (BP Location: Left arm, Patient Position: Lying)   Pulse 68   Temp 36.8 °C (98.2 °F) (Oral)   Resp 16   Ht 5' 4" (1.626 m)   Wt 69.9 kg (154 lb)   LMP  (LMP Unknown)   SpO2 100%   Breastfeeding? No   BMI 26.43 kg/m²     "

## 2020-02-20 LAB
ABO + RH BLD: NORMAL
ANION GAP SERPL CALC-SCNC: 9 MMOL/L (ref 8–16)
BASOPHILS # BLD AUTO: 0.01 K/UL (ref 0–0.2)
BASOPHILS # BLD AUTO: 0.02 K/UL (ref 0–0.2)
BASOPHILS NFR BLD: 0.1 % (ref 0–1.9)
BASOPHILS NFR BLD: 0.2 % (ref 0–1.9)
BLD GP AB SCN CELLS X3 SERPL QL: NORMAL
BLD PROD TYP BPU: NORMAL
BLD PROD TYP BPU: NORMAL
BLOOD UNIT EXPIRATION DATE: NORMAL
BLOOD UNIT EXPIRATION DATE: NORMAL
BLOOD UNIT TYPE CODE: 5100
BLOOD UNIT TYPE CODE: 5100
BLOOD UNIT TYPE: NORMAL
BLOOD UNIT TYPE: NORMAL
BUN SERPL-MCNC: 22 MG/DL (ref 6–20)
CALCIUM SERPL-MCNC: 8.3 MG/DL (ref 8.7–10.5)
CHLORIDE SERPL-SCNC: 109 MMOL/L (ref 95–110)
CO2 SERPL-SCNC: 24 MMOL/L (ref 23–29)
CODING SYSTEM: NORMAL
CODING SYSTEM: NORMAL
CREAT SERPL-MCNC: 1 MG/DL (ref 0.5–1.4)
DIFFERENTIAL METHOD: ABNORMAL
DIFFERENTIAL METHOD: ABNORMAL
DISPENSE STATUS: NORMAL
DISPENSE STATUS: NORMAL
EOSINOPHIL # BLD AUTO: 0 K/UL (ref 0–0.5)
EOSINOPHIL # BLD AUTO: 0 K/UL (ref 0–0.5)
EOSINOPHIL NFR BLD: 0 % (ref 0–8)
EOSINOPHIL NFR BLD: 0 % (ref 0–8)
ERYTHROCYTE [DISTWIDTH] IN BLOOD BY AUTOMATED COUNT: 14.2 % (ref 11.5–14.5)
ERYTHROCYTE [DISTWIDTH] IN BLOOD BY AUTOMATED COUNT: 15.1 % (ref 11.5–14.5)
EST. GFR  (AFRICAN AMERICAN): >60 ML/MIN/1.73 M^2
EST. GFR  (NON AFRICAN AMERICAN): >60 ML/MIN/1.73 M^2
GLUCOSE SERPL-MCNC: 220 MG/DL (ref 70–110)
HCT VFR BLD AUTO: 25 % (ref 37–48.5)
HCT VFR BLD AUTO: 31.7 % (ref 37–48.5)
HGB BLD-MCNC: 7.4 G/DL (ref 12–16)
HGB BLD-MCNC: 9.7 G/DL (ref 12–16)
IMM GRANULOCYTES # BLD AUTO: 0.04 K/UL (ref 0–0.04)
IMM GRANULOCYTES # BLD AUTO: 0.04 K/UL (ref 0–0.04)
IMM GRANULOCYTES NFR BLD AUTO: 0.3 % (ref 0–0.5)
IMM GRANULOCYTES NFR BLD AUTO: 0.3 % (ref 0–0.5)
LYMPHOCYTES # BLD AUTO: 1.3 K/UL (ref 1–4.8)
LYMPHOCYTES # BLD AUTO: 1.4 K/UL (ref 1–4.8)
LYMPHOCYTES NFR BLD: 10.9 % (ref 18–48)
LYMPHOCYTES NFR BLD: 11.2 % (ref 18–48)
MCH RBC QN AUTO: 25.7 PG (ref 27–31)
MCH RBC QN AUTO: 27.9 PG (ref 27–31)
MCHC RBC AUTO-ENTMCNC: 29.6 G/DL (ref 32–36)
MCHC RBC AUTO-ENTMCNC: 30.6 G/DL (ref 32–36)
MCV RBC AUTO: 87 FL (ref 82–98)
MCV RBC AUTO: 91 FL (ref 82–98)
MONOCYTES # BLD AUTO: 0.8 K/UL (ref 0.3–1)
MONOCYTES # BLD AUTO: 0.8 K/UL (ref 0.3–1)
MONOCYTES NFR BLD: 6.6 % (ref 4–15)
MONOCYTES NFR BLD: 6.8 % (ref 4–15)
NEUTROPHILS # BLD AUTO: 9.6 K/UL (ref 1.8–7.7)
NEUTROPHILS # BLD AUTO: 9.9 K/UL (ref 1.8–7.7)
NEUTROPHILS NFR BLD: 81.6 % (ref 38–73)
NEUTROPHILS NFR BLD: 82 % (ref 38–73)
NRBC BLD-RTO: 0 /100 WBC
NRBC BLD-RTO: 0 /100 WBC
PLATELET # BLD AUTO: 235 K/UL (ref 150–350)
PLATELET # BLD AUTO: 288 K/UL (ref 150–350)
PLATELET BLD QL SMEAR: ABNORMAL
PMV BLD AUTO: 11.6 FL (ref 9.2–12.9)
PMV BLD AUTO: 11.7 FL (ref 9.2–12.9)
POCT GLUCOSE: 127 MG/DL (ref 70–110)
POCT GLUCOSE: 192 MG/DL (ref 70–110)
POCT GLUCOSE: 192 MG/DL (ref 70–110)
POTASSIUM SERPL-SCNC: 4.4 MMOL/L (ref 3.5–5.1)
RBC # BLD AUTO: 2.88 M/UL (ref 4–5.4)
RBC # BLD AUTO: 3.48 M/UL (ref 4–5.4)
SODIUM SERPL-SCNC: 142 MMOL/L (ref 136–145)
TRANS ERYTHROCYTES VOL PATIENT: NORMAL ML
TRANS ERYTHROCYTES VOL PATIENT: NORMAL ML
WBC # BLD AUTO: 11.71 K/UL (ref 3.9–12.7)
WBC # BLD AUTO: 12.19 K/UL (ref 3.9–12.7)

## 2020-02-20 PROCEDURE — P9021 RED BLOOD CELLS UNIT: HCPCS

## 2020-02-20 PROCEDURE — 12000002 HC ACUTE/MED SURGE SEMI-PRIVATE ROOM

## 2020-02-20 PROCEDURE — 36415 COLL VENOUS BLD VENIPUNCTURE: CPT

## 2020-02-20 PROCEDURE — 25000003 PHARM REV CODE 250: Performed by: STUDENT IN AN ORGANIZED HEALTH CARE EDUCATION/TRAINING PROGRAM

## 2020-02-20 PROCEDURE — 85025 COMPLETE CBC W/AUTO DIFF WBC: CPT | Mod: 91

## 2020-02-20 PROCEDURE — 97802 MEDICAL NUTRITION INDIV IN: CPT

## 2020-02-20 PROCEDURE — 63600175 PHARM REV CODE 636 W HCPCS: Performed by: STUDENT IN AN ORGANIZED HEALTH CARE EDUCATION/TRAINING PROGRAM

## 2020-02-20 PROCEDURE — 86850 RBC ANTIBODY SCREEN: CPT

## 2020-02-20 PROCEDURE — 97166 OT EVAL MOD COMPLEX 45 MIN: CPT

## 2020-02-20 PROCEDURE — 94799 UNLISTED PULMONARY SVC/PX: CPT

## 2020-02-20 PROCEDURE — 80048 BASIC METABOLIC PNL TOTAL CA: CPT

## 2020-02-20 PROCEDURE — 36430 TRANSFUSION BLD/BLD COMPNT: CPT

## 2020-02-20 PROCEDURE — 94761 N-INVAS EAR/PLS OXIMETRY MLT: CPT

## 2020-02-20 PROCEDURE — 86920 COMPATIBILITY TEST SPIN: CPT

## 2020-02-20 RX ORDER — HYDROCODONE BITARTRATE AND ACETAMINOPHEN 500; 5 MG/1; MG/1
TABLET ORAL
Status: DISCONTINUED | OUTPATIENT
Start: 2020-02-20 | End: 2020-02-21 | Stop reason: HOSPADM

## 2020-02-20 RX ADMIN — GABAPENTIN 300 MG: 300 CAPSULE ORAL at 09:02

## 2020-02-20 RX ADMIN — INSULIN ASPART 2 UNITS: 100 INJECTION, SOLUTION INTRAVENOUS; SUBCUTANEOUS at 09:02

## 2020-02-20 RX ADMIN — KETOROLAC TROMETHAMINE 15 MG: 30 INJECTION, SOLUTION INTRAMUSCULAR; INTRAVENOUS at 05:02

## 2020-02-20 RX ADMIN — ACETAMINOPHEN 1000 MG: 500 TABLET ORAL at 05:02

## 2020-02-20 RX ADMIN — FLUCONAZOLE 400 MG: 2 INJECTION, SOLUTION INTRAVENOUS at 08:02

## 2020-02-20 RX ADMIN — METOPROLOL TARTRATE 25 MG: 25 TABLET ORAL at 08:02

## 2020-02-20 RX ADMIN — ATORVASTATIN CALCIUM 80 MG: 20 TABLET, FILM COATED ORAL at 09:02

## 2020-02-20 RX ADMIN — KETOROLAC TROMETHAMINE 15 MG: 30 INJECTION, SOLUTION INTRAMUSCULAR; INTRAVENOUS at 01:02

## 2020-02-20 RX ADMIN — GENTAMICIN SULFATE 240 MG: 40 INJECTION, SOLUTION INTRAMUSCULAR; INTRAVENOUS at 08:02

## 2020-02-20 RX ADMIN — ASPIRIN 81 MG CHEWABLE TABLET 81 MG: 81 TABLET CHEWABLE at 09:02

## 2020-02-20 RX ADMIN — POTASSIUM BICARBONATE 50 MEQ: 978 TABLET, EFFERVESCENT ORAL at 02:02

## 2020-02-20 RX ADMIN — ESCITALOPRAM OXALATE 10 MG: 10 TABLET ORAL at 09:02

## 2020-02-20 RX ADMIN — KETOROLAC TROMETHAMINE 15 MG: 30 INJECTION, SOLUTION INTRAMUSCULAR; INTRAVENOUS at 09:02

## 2020-02-20 RX ADMIN — ACETAMINOPHEN 1000 MG: 500 TABLET ORAL at 11:02

## 2020-02-20 RX ADMIN — FUROSEMIDE 40 MG: 40 TABLET ORAL at 09:02

## 2020-02-20 RX ADMIN — HEPARIN SODIUM 5000 UNITS: 5000 INJECTION, SOLUTION INTRAVENOUS; SUBCUTANEOUS at 05:02

## 2020-02-20 RX ADMIN — ACETAMINOPHEN 1000 MG: 500 TABLET ORAL at 06:02

## 2020-02-20 RX ADMIN — OXYBUTYNIN 10 MG: 10 TABLET, FILM COATED, EXTENDED RELEASE ORAL at 08:02

## 2020-02-20 RX ADMIN — GABAPENTIN 300 MG: 300 CAPSULE ORAL at 08:02

## 2020-02-20 RX ADMIN — OXYCODONE HYDROCHLORIDE 5 MG: 5 TABLET ORAL at 01:02

## 2020-02-20 RX ADMIN — POLYETHYLENE GLYCOL 3350 17 G: 17 POWDER, FOR SOLUTION ORAL at 09:02

## 2020-02-20 RX ADMIN — PANTOPRAZOLE SODIUM 40 MG: 40 TABLET, DELAYED RELEASE ORAL at 09:02

## 2020-02-20 NOTE — PROGRESS NOTES
[facility]  Urology  Progress Note    Patient Name: Mariann Huff  MRN: [id number]  Admission Date: 2/19/2020  Hospital Length of Stay: 1 days  Code Status: Full Code   Attending Provider: Robin Boyd MD   Primary Care Physician: Jasbir Haney MD    Subjective:     HPI:  No notes on file    Interval History: NAEON. AFVSS.  Tolerating clears.  Walked halls x1 yesterday evening.  Pain controlled.  Fontenot catheter draining light pink.  Nephrostomy tube clamped.  MADHURI with SS drainage.    Objective:     Temp:  [97.8 °F (36.6 °C)-98.9 °F (37.2 °C)] 98.5 °F (36.9 °C)  Pulse:  [66-81] 75  Resp:  [10-18] 16  SpO2:  [96 %-100 %] 96 %  BP: ()/(52-81) 104/55     Body mass index is 26.43 kg/m².           Drains     Drain                 Gastrostomy/Enterostomy 05/02/19 1134 Percutaneous endoscopic gastrostomy (PEG) LUQ decompression;feeding 293 days         Nephrostomy 10/07/19 1507 Left 8 Fr. 135 days         Nephrostomy 11/25/19 1052 Left 10 Fr. 86 days         Closed/Suction Drain 02/19/20 1430 Left Abdomen Bulb 15 Fr. less than 1 day         Urethral Catheter 02/19/20 1020 Non-latex 16 Fr. less than 1 day                Physical Exam   Constitutional: She is oriented to person, place, and time. She appears well-developed and well-nourished. No distress.   Eyes: No scleral icterus.   Cardiovascular: Normal rate and intact distal pulses.    Pulmonary/Chest: Effort normal. No respiratory distress.   Abdominal: Soft. Bowel sounds are normal. She exhibits no distension. There is tenderness (appropriate).   Incisions c/d/i. MADHURI with SS output.  Left nephrostomy tube clamped.   Genitourinary:   Genitourinary Comments: Fontenot in place.   Musculoskeletal: Normal range of motion. She exhibits no edema.   Neurological: She is alert and oriented to person, place, and time.   Skin: Skin is warm and dry. No rash noted.     Psychiatric: She has a normal mood and affect. Her behavior is normal.       Significant  Labs:    BMP:  Recent Labs   Lab 02/19/20  1540 02/20/20  0418    142   K 3.1* 4.4    109   CO2 20* 24   BUN 21* 22*   CREATININE 1.0 1.0   CALCIUM 8.2* 8.3*       CBC:   Recent Labs   Lab 02/19/20  1540 02/20/20  0418   WBC 13.94* 12.19   HGB 9.4* 7.4*   HCT 31.8* 25.0*    288       Significant Imaging:  All pertinent imaging results/findings from the past 24 hours have been reviewed.                  Assessment/Plan:     Left ureteral injury  60 yo female with hx of left ureteral injury during L5/S1 fusion, underwent primary ureteroureterostomy that was complicated with breakdown of the repair. Now s/p left ureteral reimplant with psoas hitch and boari flap on 2/20/2020. Doing well.    - Diet: diabetic with Impact AR supplement  - Nutrition consulted - appreciate recs  - Pain controlled  - Maintain correa - draining light pink  - Oxybutynin 10 for bladder spasms  - Maintain nephrostomy tube - clamped  - Fluconazole  - Gentamicin 1 dose post op  - Ambulate  - MADHURI with SS output - maintain  - SCD, heparin        VTE Risk Mitigation (From admission, onward)         Ordered     heparin (porcine) injection 5,000 Units  Every 8 hours      02/19/20 1514     Place CRISTINA hose  Until discontinued      02/19/20 1514     Place sequential compression device  Until discontinued      02/19/20 1514     IP VTE HIGH RISK PATIENT  Once      02/19/20 1514                Bertin Uribe MD  Urology  [facility]

## 2020-02-20 NOTE — PLAN OF CARE
Pt is AAOX4,VSS. Pt is progressing towards plan of care goals. Pain management has been assessed. Pt reports pain at a 4. PRN meds offered, and pain reassessed throughout shift. Ambulated in hallway. SCDs in place with frequent checks for skin breakdown. MADHURI drain in place, output documented. Urinary catheter in place and output documented. Fall precautions in place, no report of falls. Safety measures are in place bed in lowest position, wheels locked, call light within reach. Will continue to monitor.

## 2020-02-20 NOTE — ANESTHESIA POSTPROCEDURE EVALUATION
Anesthesia Post Evaluation    Patient: Mariann Huff    Procedure(s) Performed: Procedure(s) (LRB):  LAPAROTOMY, EXPLORATORY (N/A)  LYSIS, ADHESIONS (N/A)  REIMPLANTATION, URETER WITH PSOAS HITCH (Left)  REPLACEMENT, NEPHROSTOMY TUBE removal removal (Right)  STENT, URETERAL    Final Anesthesia Type: general    Patient location during evaluation: floor  Patient participation: Yes- Able to Participate  Level of consciousness: awake and alert  Post-procedure vital signs: reviewed and stable  Pain management: adequate  Airway patency: patent    PONV status at discharge: No PONV  Anesthetic complications: no      Cardiovascular status: hemodynamically stable  Respiratory status: unassisted  Hydration status: euvolemic  Follow-up not needed.          Vitals Value Taken Time   /56 2/20/2020  7:42 AM   Temp 36.8 °C (98.3 °F) 2/20/2020  7:42 AM   Pulse 81 2/20/2020  7:42 AM   Resp 14 2/20/2020  7:42 AM   SpO2 95 % 2/20/2020  7:42 AM         Event Time     Out of Recovery 16:00:00          Pain/Derick Score: Pain Rating Prior to Med Admin: 2 (2/20/2020  5:02 AM)  Derick Score: 10 (2/19/2020  4:00 PM)

## 2020-02-20 NOTE — PLAN OF CARE
Leticia complete. Pt tolerated session well. D/C from OT.  Discharge Recommendation: Home  DME rec: None    Problem: Occupational Therapy Goal  Goal: Occupational Therapy Goal  2/20/2020 1702 by Osvaldo Beavers, OT  Outcome: Met

## 2020-02-20 NOTE — PLAN OF CARE
Leticia complete. Pt tolerated session well. D/C from OT.  Discharge Recommendation: Home  DME rec: None

## 2020-02-20 NOTE — PLAN OF CARE
02/20/20 1040   Discharge Assessment   Assessment Type Discharge Planning Assessment   Confirmed/corrected address and phone number on facesheet? Yes   Assessment information obtained from? Patient   Expected Length of Stay (days) 5   Communicated expected length of stay with patient/caregiver yes   Prior to hospitilization cognitive status: Alert/Oriented   Prior to hospitalization functional status: Independent;Assistive Equipment   Current cognitive status: Alert/Oriented   Current Functional Status: Independent;Assistive Equipment   Lives With spouse   Able to Return to Prior Arrangements yes   Is patient able to care for self after discharge? Unable to determine at this time (comments)   Who are your caregiver(s) and their phone number(s)? Shamir Huff spouse [phone]   Patient's perception of discharge disposition home or selfcare   Readmission Within the Last 30 Days no previous admission in last 30 days   Patient currently receives any other outside agency services? No   Equipment Currently Used at Home walker, rolling;cane, quad   Do you have any problems affording any of your prescribed medications? No   Is the patient taking medications as prescribed? yes   Does the patient have transportation home? Yes   Transportation Anticipated family or friend will provide   Dialysis Name and Scheduled days n/a   Does the patient receive services at the [facility]? No   Discharge Plan A Home with family   Discharge Plan B Home Health;Home with family   DME Needed Upon Discharge  none   Patient/Family in Agreement with Plan yes     If necessary, has used [facility] in past and is happy with their services.

## 2020-02-20 NOTE — PT/OT/SLP PROGRESS
Physical Therapy   Update    Mariann Huff   MRN: [id number]     Attempted to see patient for PT evaluation at 1245; however, she had just finished OT assessment and was getting PRBC's. Will follow-up tomorrow for formal PT evaluation; see OT note today for recs and mobility. No billable units today.    Gallito Osuna, PT  2/20/2020

## 2020-02-20 NOTE — SUBJECTIVE & OBJECTIVE
Interval History: NAEON. AFVSS.  Tolerating clears.  Walked halls x1 yesterday evening.  Pain controlled.  Fontenot catheter draining light pink.  Nephrostomy tube clamped.  MADHURI with SS drainage.    Objective:     Temp:  [97.8 °F (36.6 °C)-98.9 °F (37.2 °C)] 98.5 °F (36.9 °C)  Pulse:  [66-81] 75  Resp:  [10-18] 16  SpO2:  [96 %-100 %] 96 %  BP: ()/(52-81) 104/55     Body mass index is 26.43 kg/m².           Drains     Drain                 Gastrostomy/Enterostomy 05/02/19 1134 Percutaneous endoscopic gastrostomy (PEG) LUQ decompression;feeding 293 days         Nephrostomy 10/07/19 1507 Left 8 Fr. 135 days         Nephrostomy 11/25/19 1052 Left 10 Fr. 86 days         Closed/Suction Drain 02/19/20 1430 Left Abdomen Bulb 15 Fr. less than 1 day         Urethral Catheter 02/19/20 1020 Non-latex 16 Fr. less than 1 day                Physical Exam   Constitutional: She is oriented to person, place, and time. She appears well-developed and well-nourished. No distress.   Eyes: No scleral icterus.   Cardiovascular: Normal rate and intact distal pulses.    Pulmonary/Chest: Effort normal. No respiratory distress.   Abdominal: Soft. Bowel sounds are normal. She exhibits no distension. There is tenderness (appropriate).   Incisions c/d/i. MADHURI with SS output.  Left nephrostomy tube clamped.   Genitourinary:   Genitourinary Comments: Fontenot in place.   Musculoskeletal: Normal range of motion. She exhibits no edema.   Neurological: She is alert and oriented to person, place, and time.   Skin: Skin is warm and dry. No rash noted.     Psychiatric: She has a normal mood and affect. Her behavior is normal.       Significant Labs:    BMP:  Recent Labs   Lab 02/19/20  1540 02/20/20  0418    142   K 3.1* 4.4    109   CO2 20* 24   BUN 21* 22*   CREATININE 1.0 1.0   CALCIUM 8.2* 8.3*       CBC:   Recent Labs   Lab 02/19/20  1540 02/20/20  0418   WBC 13.94* 12.19   HGB 9.4* 7.4*   HCT 31.8* 25.0*    288       Significant  Imaging:  All pertinent imaging results/findings from the past 24 hours have been reviewed.

## 2020-02-20 NOTE — PLAN OF CARE
Recommendations    1. Continue diabetic diet + Boost Glucose Control.   2. Encourage po and ONS intake.   3. RD following.    Goals: consume >75% of all meals by RD follow-up  Nutrition Goal Status: new

## 2020-02-20 NOTE — ASSESSMENT & PLAN NOTE
58 yo female with hx of left ureteral injury during L5/S1 fusion, underwent primary ureteroureterostomy that was complicated with breakdown of the repair. Now s/p left ureteral reimplant with psoas hitch and boari flap on 2/20/2020. Doing well.    - Diet: diabetic with Impact AR supplement  - Nutrition consulted - appreciate recs  - Pain controlled  - Maintain correa - draining light pink  - Oxybutynin 10 for bladder spasms  - Maintain nephrostomy tube - clamped  - Fluconazole  - Gentamicin 1 dose post op  - Ambulate  - MADHURI with SS output - maintain  - SCD, heparin

## 2020-02-20 NOTE — PT/OT/SLP EVAL
Occupational Therapy   Evaluation and Discharge Note    Name: Mariann Huff  MRN: [id number]  Admitting Diagnosis:  <principal problem not specified> 1 Day Post-Op    Recommendations:     Discharge Recommendations: home with home health  Discharge Equipment Recommendations:  none  Barriers to discharge:  None    Assessment:     Mariann Huff is a 59 y.o. female with a medical diagnosis of <principal problem not specified>. At this time, patient is functioning at their prior level of function and does not require further acute OT services.     Plan:     During this hospitalization, patient does not require further acute OT services.  Please re-consult if situation changes.    · Plan of Care Reviewed with: patient    Subjective     Chief Complaint: Pain at surgical site  Patient/Family Comments/goals: Medical management and discharge.       Occupational Profile:  Living Environment: pt lives with spouse in single story home with no steps to enter  Previous level of function: mod (I) ADL, mobility via RW  Roles and Routines: homemaker, wife  Equipment Used at Home:  walker, rolling(built in shower chair)  Assistance upon Discharge: to be provided by family    Pain/Comfort:  · Pain Rating 1: 0/10  · Pain Rating Post-Intervention 1: 0/10    Patients cultural, spiritual, Buddhism conflicts given the current situation: no    Objective:     Communicated with: RN prior to session.  Patient found HOB elevated with peripheral IV, correa catheter, MADHURI drain, telemetry upon OT entry to room.    General Precautions: Standard, fall   Orthopedic Precautions:N/A   Braces: N/A     Occupational Performance:    Bed Mobility:    · Patient completed Rolling/Turning to Left with  independence  · Patient completed Rolling/Turning to Right with independence  · Patient completed Scooting/Bridging with independence  · Patient completed Supine to Sit with independence  · Patient completed Sit to Supine with independence    Functional  Mobility/Transfers:  · Patient completed Sit <> Stand Transfer with supervision  with  no assistive device   · Patient completed Bed <> Chair Transfer using Step Transfer technique with supervision with no assistive device  Functional Mobility:  Pt was able to walk around the bed w/ supervision, and no AD.      Activities of Daily Living:  · Bathing: supervision w/ setup seated at EOB to wash face  · Upper Body Dressing: modified independence seated at EOB  · Lower Body Dressing: modified independence seated at EOB w/ setup    Cognitive/Visual Perceptual:  Completely cognitively intact adult w/ no glasses worn or present during eval.       Physical Exam:  Balance:    -       seated/standing: good  Dominant hand:    -       RH  Upper Extremity Range of Motion:     -       Right Upper Extremity: WFL  -       Left Upper Extremity: WFL  Upper Extremity Strength:    -       Right Upper Extremity: WFL  -       Left Upper Extremity: WFL   Strength:    -       Right Upper Extremity: WFL  -       Left Upper Extremity: WFL  Fine Motor Coordination:    -       Intact  Gross motor coordination:   WFL    AMPAC 6 Click ADL:  AMPAC Total Score: 23    Treatment & Education:  -Pt edu on OT role/POC  -Importance of OOB activity with staff assistance  -Safety during functional t/f and mobility  - White board updated  - Multiple self care tasks/functional mobility completed-- assistance level noted above  - All questions/concerns answered within OT scope of practice    Education:    Patient left HOB elevated with all lines intact, call button in reach and RN notified    GOALS:   Multidisciplinary Problems     Occupational Therapy Goals     Not on file          Multidisciplinary Problems (Resolved)        Problem: Occupational Therapy Goal    Goal Priority Disciplines Outcome Interventions   Occupational Therapy Goal   (Resolved)     OT, PT/OT Met                    History:     Past Medical History:   Diagnosis Date     Anticoagulant long-term use     Asthma     Back pain     Bradycardia, unspecified 2019    The etiology of the bradycardic episode is unclear.  The have appear to be respiratory in origin (at least the 1st episode).  We will continue to monitor carefully.  We are awaiting evaluation by Cardiology.     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, with long-term current use of insulin 2020       Past Surgical History:   Procedure Laterality Date    ANTEGRADE NEPHROSTOGRAPHY Left 2019    Procedure: Nephrostogram - antegrade;  Surgeon: Robin Boyd MD;  Location: [address];  Service: Urology;  Laterality: Left;    BRONCHOSCOPY N/A 2019    Procedure: BRONCHOSCOPY;  Surgeon: Sean Ruano MD;  Location: [facility] OR [address] [place] [address];  Service: General;  Laterality: N/A;    CARDIAC CATHETERIZATION      CATARACT EXTRACTION      cataract extraction left eye      cataracts      CAUDAL EPIDURAL STEROID INJECTION N/A 2019    Procedure: Injection-steroid-epidural-caudal;  Surgeon: Dave Bentley Jr., MD;  Location: [facility] PAIN MGT;  Service: Pain Management;  Laterality: N/A;     SECTION, LOW TRANSVERSE      COLONOSCOPY N/A 2019    Procedure: COLONOSCOPY;  Surgeon: Al Alaniz MD;  Location: [facility] ENDO ([address] [place] [address]);  Service: Endoscopy;  Laterality: N/A;    CORONARY ANGIOPLASTY      CYSTOGRAM N/A 2019    Procedure: CYSTOGRAM INTRAOP;  Surgeon:  Robin Boyd MD;  Location: [address];  Service: Urology;  Laterality: N/A;  1 HOUR    CYSTOSCOPY W/ RETROGRADES Left 12/11/2019    Procedure: CYSTOSCOPY, WITH RETROGRADE PYELOGRAM;  Surgeon: Robin Boyd MD;  Location: [address];  Service: Urology;  Laterality: Left;  fluro    ESOPHAGOGASTRODUODENOSCOPY W/ PEG  5/2/2019    Procedure: EGD, WITH PEG TUBE INSERTION;  Surgeon: Sean Ruano MD;  Location: [address];  Service: General;;    EXCISION TURBINATE, SUBMUCOUS      FLEXIBLE SIGMOIDOSCOPY N/A 5/13/2019    Procedure: SIGMOIDOSCOPY, FLEXIBLE;  Surgeon: ALBERTO Amin MD;  Location: [facility] ENDO ([address] [place] [address]);  Service: Endoscopy;  Laterality: N/A;    FLEXIBLE SIGMOIDOSCOPY N/A 5/21/2019    Procedure: SIGMOIDOSCOPY, FLEXIBLE;  Surgeon: ALBERTO Amin MD;  Location: [facility] ENDO ([address] [place] [address]);  Service: Endoscopy;  Laterality: N/A;    FUSION OF LUMBAR SPINE BY ANTERIOR APPROACH Left 4/12/2019    Procedure: FUSION, SPINE, LUMBAR, ANTERIOR APPROACH Left L5-S1 Anterior to Psoa Interbody Fusion, L5-S1 Posterior Instrumentation;  Surgeon: Mk George MD;  Location: [address];  Service: Neurosurgery;  Laterality: Left;  Porcedure:  Left L5-S1 Anterior to Psoa Interbody Fusion, L5-S1 Posterior Instrumentation  Surgery Time: 4 Hrs  LOS: 2-3  Anesthesia: General   Blood: Type & Screen  R    HAND SURGERY Left     HAND SURGERY Right     torn ligament repair/ Dr. Yeboah    HYSTERECTOMY      left foot surgery      left wrist surgery      LYSIS OF ADHESIONS N/A 2/19/2020    Procedure: LYSIS, ADHESIONS;  Surgeon: Robin Boyd MD;  Location: [address];  Service: Urology;  Laterality: N/A;    NASAL SEPTUM SURGERY  5/7/15    PERCUTANEOUS NEPHROSTOMY Left 4/21/2019    Procedure: Creation, Nephrostomy, Percutaneous;  Surgeon: Karina Surgeon;  Location: [facility] KARINA;  Service: Anesthesiology;  Laterality: Left;    REPAIR OF URETER  4/12/2019    Procedure: REPAIR, URETER;  Surgeon: Mk  JOSE J George MD;  [facility];  Service: Neurosurgery;;    REPLACEMENT OF NEPHROSTOMY TUBE N/A 7/18/2019    Procedure: REPLACEMENT, NEPHROSTOMY TUBE;  Surgeon: [facility];  Location: [facility] OR 2ND FLR;  Service: Anesthesiology;  Laterality: N/A;  188    REPLACEMENT OF NEPHROSTOMY TUBE N/A 7/24/2019    Procedure: REPLACEMENT, NEPHROSTOMY TUBE;  Surgeon: [facility];  [facility];  Service: Anesthesiology;  Laterality: N/A;  188    REPLACEMENT OF NEPHROSTOMY TUBE N/A 10/7/2019    Procedure: REPLACEMENT, NEPHROSTOMY TUBE;  Surgeon: [facility];  [facility];  Service: Anesthesiology;  Laterality: N/A;  189    REPLACEMENT OF NEPHROSTOMY TUBE N/A 11/25/2019    Procedure: REPLACEMENT, NEPHROSTOMY TUBE;  Surgeon: [facility];  [facility];  Service: Anesthesiology;  Laterality: N/A;  Room 188/Bessy    REPLACEMENT OF NEPHROSTOMY TUBE Right 2/19/2020    Procedure: REPLACEMENT, NEPHROSTOMY TUBE removal removal;  Surgeon: Robin Boyd MD;  Location: [facility] OR [address] [place] [address];  Service: Urology;  Laterality: Right;    rt elbow surgery      S/P LAD COATED STENT  05/14/2010    6 total     S/P OM1 STENT  08/2003    SINUS SURGERY      F.E.S.S.    TRACHEOSTOMY N/A 5/2/2019    Procedure: CREATION, TRACHEOSTOMY;  Surgeon: Sean Ruano MD;  Location: [address];  Service: General;  Laterality: N/A;    TUBAL LIGATION      URETERAL REIMPLANTION Left 2/19/2020    Procedure: REIMPLANTATION, URETER WITH PSOAS HITCH;  Surgeon: Robin Boyd MD;  Location: [facility] OR [address] [place] [address];  Service: Urology;  Laterality: Left;       Time Tracking:     OT Date of Treatment: 02/20/20  OT Start Time: 1205  OT Stop Time: 1220  OT Total Time (min): 15 min    Billable Minutes:Evaluation 15 mins'    Osvaldo Beavers, OT  2/20/2020

## 2020-02-20 NOTE — CONSULTS
"  [facility]  Adult Nutrition  Consult Note    SUMMARY     Recommendations    1. Continue diabetic diet + Boost Glucose Control.   2. Encourage po and ONS intake.   3. RD following.    Goals: consume >75% of all meals by RD follow-up  Nutrition Goal Status: new  Communication of RD Recs: (POC)    Reason for Assessment    Reason For Assessment: consult  Diagnosis: (left ureteral injury)  Relevant Medical History: T2DM, MURTAZA, HTN, HLD, CHF, CAD, asthma  Interdisciplinary Rounds: did not attend  General Information Comments: POD1 left ureteral reimplant with psoas hitch and boari flap. Pt resting in bed with family member at bedside. Noted diet advance to diabetic + Boost Glucose Control this AM. Pt reports that appetite is good, but has not recevied solid meals at this time. Agreed to try ONS. Pt reports that appetite was great with no wt loss PTA. Noted wt gain per chart review. NFPE not warranted. Pt appears nourished, obese. Dicussed A1c of 9.0. Pt reports previous diabetic diet education in the past and checks blood sugars at home. Dicussed CHO serving sizes and meal portions. Pt verbalized understanding with no questions at this time.  Nutrition Discharge Planning: adequate po intake    Nutrition Risk Screen    Nutrition Risk Screen: no indicators present    Nutrition/Diet History    Spiritual, Cultural Beliefs, Roman [employer] Practices, Values that Affect Care: no  Factors Affecting Nutritional Intake: None identified at this time    Anthropometrics    Temp: 98.3 °F (36.8 °C)  Height Method: Stated  Height: 5' 4" (162.6 cm)  Height (inches): 64 in  Weight Method: Stated  Weight: 69.9 kg (154 lb)  Weight (lb): 154 lb  Ideal Body Weight (IBW), Female: 120 lb  % Ideal Body Weight, Female (lb): 128.33 %  BMI (Calculated): 26.4  BMI Grade: 25 - 29.9 - overweight     Lab/Procedures/Meds    Pertinent Labs Reviewed: reviewed  Pertinent Labs Comments: BUN 22, glucose 220, A1c 9.0  Pertinent Medications " Reviewed: reviewed  Pertinent Medications Comments: acetaminophen, amlodipine, aspirin, statin, lasix, losartan, metoprolol, pantoprazole, IVF    Estimated/Assessed Needs    Weight Used For Calorie Calculations: 69.9 kg (154 lb 1.6 oz)  Energy Calorie Requirements (kcal): 1747 kcal/day  Energy Need Method: Kcal/kg(25)  Protein Requirements: 70-84 gm/day(1.0-1.2 gm/kg)  Weight Used For Protein Calculations: 69.9 kg (154 lb 1.6 oz)  Fluid Requirements (mL): 1 mL/kcal or per MD     RDA Method (mL): 1747  CHO Requirement: 218 gm CHO    Nutrition Prescription Ordered    Current Diet Order: Diabetic  Oral Nutrition Supplement: Boost Glucose Control    Evaluation of Received Nutrient/Fluid Intake    Comments: LBM 2/19  Tolerance: tolerating  % Intake of Estimated Energy Needs: Other: ANDRE; diet just advanced  % Meal Intake: Other: ANDRE; diet just advanced    Nutrition Risk    Level of Risk/Frequency of Follow-up: low(f/u 1 x wk)     Assessment and Plan    Nutrition Problem  Increased Nutrient Needs: Protein    Related to (etiology):   Wound healing demands    Signs and Symptoms (as evidenced by):   S/p left ureteral reimplant with psoas hitch and boari flap    Interventions (treatment strategy):  Collaboration of nutrition care with other providers  Commercial beverage - Boost Glucose Control    Nutrition Diagnosis Status:   New     Monitor and Evaluation    Food and Nutrient Intake: energy intake, food and beverage intake  Food and Nutrient Adminstration: diet order  Physical Activity and Function: nutrition-related ADLs and IADLs  Anthropometric Measurements: weight, weight change, body mass index  Biochemical Data, Medical Tests and Procedures: electrolyte and renal panel, gastrointestinal profile, glucose/endocrine profile, inflammatory profile, lipid profile  Nutrition-Focused Physical Findings: overall appearance     Nutrition Follow-Up    RD Follow-up?: Yes

## 2020-02-21 VITALS
OXYGEN SATURATION: 94 % | HEART RATE: 63 BPM | SYSTOLIC BLOOD PRESSURE: 136 MMHG | WEIGHT: 154.31 LBS | TEMPERATURE: 97 F | HEIGHT: 64 IN | BODY MASS INDEX: 26.34 KG/M2 | DIASTOLIC BLOOD PRESSURE: 63 MMHG | RESPIRATION RATE: 18 BRPM

## 2020-02-21 DIAGNOSIS — Z96.0 STATUS POST PLACEMENT OF URETERAL STENT: ICD-10-CM

## 2020-02-21 DIAGNOSIS — Z98.890 S/P URETERAL REIMPLANTATION: Primary | ICD-10-CM

## 2020-02-21 LAB
ANION GAP SERPL CALC-SCNC: 7 MMOL/L (ref 8–16)
BASOPHILS # BLD AUTO: 0.03 K/UL (ref 0–0.2)
BASOPHILS NFR BLD: 0.3 % (ref 0–1.9)
BUN SERPL-MCNC: 25 MG/DL (ref 6–20)
CALCIUM SERPL-MCNC: 8.7 MG/DL (ref 8.7–10.5)
CHLORIDE SERPL-SCNC: 110 MMOL/L (ref 95–110)
CO2 SERPL-SCNC: 26 MMOL/L (ref 23–29)
CREAT SERPL-MCNC: 1 MG/DL (ref 0.5–1.4)
DIFFERENTIAL METHOD: ABNORMAL
EOSINOPHIL # BLD AUTO: 0.1 K/UL (ref 0–0.5)
EOSINOPHIL NFR BLD: 0.5 % (ref 0–8)
ERYTHROCYTE [DISTWIDTH] IN BLOOD BY AUTOMATED COUNT: 15.1 % (ref 11.5–14.5)
EST. GFR  (AFRICAN AMERICAN): >60 ML/MIN/1.73 M^2
EST. GFR  (NON AFRICAN AMERICAN): >60 ML/MIN/1.73 M^2
GLUCOSE SERPL-MCNC: 157 MG/DL (ref 70–110)
HCT VFR BLD AUTO: 30.9 % (ref 37–48.5)
HGB BLD-MCNC: 9.4 G/DL (ref 12–16)
IMM GRANULOCYTES # BLD AUTO: 0.04 K/UL (ref 0–0.04)
IMM GRANULOCYTES NFR BLD AUTO: 0.4 % (ref 0–0.5)
LYMPHOCYTES # BLD AUTO: 1.8 K/UL (ref 1–4.8)
LYMPHOCYTES NFR BLD: 18 % (ref 18–48)
MCH RBC QN AUTO: 27.5 PG (ref 27–31)
MCHC RBC AUTO-ENTMCNC: 30.4 G/DL (ref 32–36)
MCV RBC AUTO: 90 FL (ref 82–98)
MONOCYTES # BLD AUTO: 0.7 K/UL (ref 0.3–1)
MONOCYTES NFR BLD: 7.3 % (ref 4–15)
NEUTROPHILS # BLD AUTO: 7.5 K/UL (ref 1.8–7.7)
NEUTROPHILS NFR BLD: 73.5 % (ref 38–73)
NRBC BLD-RTO: 0 /100 WBC
PLATELET # BLD AUTO: 218 K/UL (ref 150–350)
PMV BLD AUTO: 11.7 FL (ref 9.2–12.9)
POCT GLUCOSE: 149 MG/DL (ref 70–110)
POTASSIUM SERPL-SCNC: 3.9 MMOL/L (ref 3.5–5.1)
RBC # BLD AUTO: 3.42 M/UL (ref 4–5.4)
SODIUM SERPL-SCNC: 143 MMOL/L (ref 136–145)
WBC # BLD AUTO: 10.19 K/UL (ref 3.9–12.7)

## 2020-02-21 PROCEDURE — 25000003 PHARM REV CODE 250: Performed by: STUDENT IN AN ORGANIZED HEALTH CARE EDUCATION/TRAINING PROGRAM

## 2020-02-21 PROCEDURE — 36415 COLL VENOUS BLD VENIPUNCTURE: CPT

## 2020-02-21 PROCEDURE — 63600175 PHARM REV CODE 636 W HCPCS: Performed by: STUDENT IN AN ORGANIZED HEALTH CARE EDUCATION/TRAINING PROGRAM

## 2020-02-21 PROCEDURE — 85025 COMPLETE CBC W/AUTO DIFF WBC: CPT

## 2020-02-21 PROCEDURE — 80048 BASIC METABOLIC PNL TOTAL CA: CPT

## 2020-02-21 RX ORDER — OXYBUTYNIN CHLORIDE 5 MG/1
10 TABLET, EXTENDED RELEASE ORAL DAILY
Qty: 60 TABLET | Refills: 0 | Status: SHIPPED | OUTPATIENT
Start: 2020-02-21 | End: 2020-06-18

## 2020-02-21 RX ORDER — OXYCODONE AND ACETAMINOPHEN 5; 325 MG/1; MG/1
1 TABLET ORAL EVERY 6 HOURS PRN
Qty: 11 TABLET | Refills: 0 | Status: SHIPPED | OUTPATIENT
Start: 2020-02-21 | End: 2020-09-14 | Stop reason: SDUPTHER

## 2020-02-21 RX ORDER — NITROFURANTOIN MACROCRYSTALS 50 MG/1
50 CAPSULE ORAL DAILY
Qty: 7 CAPSULE | Refills: 0 | Status: SHIPPED | OUTPATIENT
Start: 2020-02-21 | End: 2020-03-09 | Stop reason: ALTCHOICE

## 2020-02-21 RX ORDER — POLYETHYLENE GLYCOL 3350 17 G/17G
17 POWDER, FOR SOLUTION ORAL DAILY
Qty: 14 EACH | Refills: 0 | Status: SHIPPED | OUTPATIENT
Start: 2020-02-21 | End: 2021-05-18

## 2020-02-21 RX ADMIN — FUROSEMIDE 40 MG: 40 TABLET ORAL at 09:02

## 2020-02-21 RX ADMIN — LOSARTAN POTASSIUM 100 MG: 50 TABLET, FILM COATED ORAL at 09:02

## 2020-02-21 RX ADMIN — OXYBUTYNIN 10 MG: 10 TABLET, FILM COATED, EXTENDED RELEASE ORAL at 06:02

## 2020-02-21 RX ADMIN — KETOROLAC TROMETHAMINE 15 MG: 30 INJECTION, SOLUTION INTRAMUSCULAR; INTRAVENOUS at 05:02

## 2020-02-21 RX ADMIN — ACETAMINOPHEN 1000 MG: 500 TABLET ORAL at 05:02

## 2020-02-21 RX ADMIN — ESCITALOPRAM OXALATE 10 MG: 10 TABLET ORAL at 09:02

## 2020-02-21 RX ADMIN — GABAPENTIN 300 MG: 300 CAPSULE ORAL at 09:02

## 2020-02-21 RX ADMIN — ASPIRIN 81 MG CHEWABLE TABLET 81 MG: 81 TABLET CHEWABLE at 09:02

## 2020-02-21 RX ADMIN — AMLODIPINE BESYLATE 10 MG: 5 TABLET ORAL at 09:02

## 2020-02-21 RX ADMIN — PANTOPRAZOLE SODIUM 40 MG: 40 TABLET, DELAYED RELEASE ORAL at 09:02

## 2020-02-21 RX ADMIN — METOPROLOL TARTRATE 25 MG: 25 TABLET ORAL at 09:02

## 2020-02-21 RX ADMIN — OXYCODONE HYDROCHLORIDE 5 MG: 5 TABLET ORAL at 12:02

## 2020-02-21 RX ADMIN — ACETAMINOPHEN 1000 MG: 500 TABLET ORAL at 12:02

## 2020-02-21 RX ADMIN — POLYETHYLENE GLYCOL 3350 17 G: 17 POWDER, FOR SOLUTION ORAL at 09:02

## 2020-02-21 RX ADMIN — ATORVASTATIN CALCIUM 80 MG: 20 TABLET, FILM COATED ORAL at 09:02

## 2020-02-21 NOTE — DISCHARGE SUMMARY
[facility]  Urology  Discharge Summary      Patient Name: Mariann Huff  MRN: [id number]  Admission Date: 2/19/2020  Hospital Length of Stay: 2 days  Discharge Date and Time: 2/21/2020 11:14 AM  Attending Physician: Robin Boyd MD  Discharging Provider: Christian Hyatt MD  Primary Care Physician: Jasbir Haney MD    HPI:   60 yo female with hx of left ureteral injury during L5/S1 fusion, underwent primary ureteroureterostomy that was complicated with breakdown of the repair. Now s/p left ureteral reimplant with psoas hitch and boari flap on 2/19/2020.    Procedure(s) (LRB):  LAPAROTOMY, EXPLORATORY (N/A)  LYSIS, ADHESIONS (N/A)  REIMPLANTATION, URETER WITH PSOAS HITCH (Left)  REPLACEMENT, NEPHROSTOMY TUBE removal removal (Right)  STENT, URETERAL     Indwelling Lines/Drains at time of discharge:   Lines/Drains/Airways     Drain                 Gastrostomy/Enterostomy 05/02/19 1134 Percutaneous endoscopic gastrostomy (PEG) LUQ decompression;feeding 295 days         Nephrostomy 10/07/19 1507 Left 8 Fr. 136 days         Nephrostomy 11/25/19 1052 Left 10 Fr. 88 days         Urethral Catheter 02/19/20 1020 Non-latex 16 Fr. 2 days         Closed/Suction Drain 02/19/20 1430 Left Abdomen Bulb 15 Fr. 1 day                Hospital Course (synopsis of major diagnoses, care, treatment, and services provided during the course of the hospital stay): Patient underwent left ureteral reimplant with psoas hitch and boari flap on 2/19/20. She tolerated the surgery well and was transferred to the floor from PACU.  She ambulated and tolerated a regular diet. Her pain was controlled. She remained afebrile and her vitals were stable. Her H/H dropped the AM of 2/20 and responded well with 2 U PRBC. Her H/H subsequently remained stable. Her UOP was good. Her left nephrostomy tube was removed on 2/20. She was deemed safe for discharge home on 2/21. She will be discharged with her Carson drain and correa in place.    Consults:    Consults (From admission, onward)        Status Ordering Provider     Inpatient consult to Registered Dietitian/Nutritionist  Once     Provider:  (Not yet assigned)    Completed LEANNE SMITH Diagnostic Studies:    Pending Diagnostic Studies:     Procedure Component Value Units Date/Time    Specimen to Pathology, Surgery Urology [[id number]] Collected:  02/19/20 1450    Order Status:  Sent Lab Status:  In process Updated:  02/19/20 1854          Final Active Diagnoses:    Diagnosis Date Noted POA    PRINCIPAL PROBLEM:  Left ureteral injury [S37.10XA] 07/24/2019 Yes      Problems Resolved During this Admission:         Discharged Condition: good    Disposition: Home or Self Care    Follow Up:  Follow-up Information     Robin Boyd MD In 2 weeks.    Specialty:  Urology  Why:  Post-op follow-up with cystogram before.  Contact information:  [id number]  [facility] [address]  [phone]                 Patient Instructions:      FL Cystogram Minimum 3 Views (xpd) - Rad Performed   Standing Status: Future Standing Exp. Date: 02/21/21     Order Specific Question Answer Comments   May the Radiologist modify the order per protocol to meet the clinical needs of the patient? Yes      Diet diabetic     No driving until:   Order Comments: Off narcotics     Notify your health care provider if you experience any of the following:  temperature >100.4     Notify your health care provider if you experience any of the following:  persistent nausea and vomiting or diarrhea     Notify your health care provider if you experience any of the following:  severe uncontrolled pain     Notify your health care provider if you experience any of the following:  redness, tenderness, or signs of infection (pain, swelling, redness, odor or green/yellow discharge around incision site)     Notify your health care provider if you experience any of the following:  difficulty breathing or increased cough      Notify your health care provider if you experience any of the following:  severe persistent headache     Notify your health care provider if you experience any of the following:  worsening rash     Notify your health care provider if you experience any of the following:  persistent dizziness, light-headedness, or visual disturbances     Notify your health care provider if you experience any of the following:  increased confusion or weakness     Other restrictions (specify):   Order Comments: Please record your daily drain output and bring log with you to your next clinic appointment. If output is less than 100 mL per day, you can call the clinic to have it removed. Follow-up in clinic in two weeks with cystogram prior.    Do not shower until 48 hours after surgery. Your incisions were closed with absorbable suture. In order to maintain the strength of the absorbable sutures, do not scrub incisions in the shower, but rather, let soapy water run over incisions. Do not submerge incisions in water (no baths, hot tubs, or swimming pools). Your incisions were covered with Dermabond (skin glue), which will fall off on its own. Do not lift anything heavier than 10 lbs (e.g., a gallon of milk) for 6 weeks post-op in order to prevent incisional hernias.     Activity as tolerated     Medications:  Reconciled Home Medications:      Medication List      START taking these medications    blood-glucose sensor Leann  Commonly known as:  Dexcom G6 Sensor  3 each by Misc.(Non-Drug; Combo Route) route continuous prn.     blood-glucose transmitter Leann  Commonly known as:  Dexcom G6 Transmitter  1 each by Misc.(Non-Drug; Combo Route) route continuous prn.     nitrofurantoin 50 MG capsule  Commonly known as:  MACRODANTIN  Take 1 capsule (50 mg total) by mouth once daily.     oxybutynin 5 MG Tr24  Commonly known as:  DITROPAN-XL  Take 2 tablets (10 mg total) by mouth once daily.     oxyCODONE-acetaminophen 5-325 mg per  tablet  Commonly known as:  PERCOCET  Take 1 tablet by mouth every 6 (six) hours as needed for Pain.  Replaces:  oxyCODONE-acetaminophen  mg per tablet     polyethylene glycol 17 gram Pwpk  Commonly known as:  GLYCOLAX  Mix 1 capful (17 g) in liquid and take by mouth once daily.        CONTINUE taking these medications    Accu-Chek Ivy Plus Meter Misc  Generic drug:  blood-glucose meter     albuterol 90 mcg/actuation inhaler  Commonly known as:  PROVENTIL/VENTOLIN HFA  Inhale 2 puffs into the lungs every 6 (six) hours as needed for Wheezing.     albuterol-ipratropium 2.5 mg-0.5 mg/3 mL nebulizer solution  Commonly known as:  DUO-NEB  Inhale 3 mLs into the lungs.     amLODIPine 10 MG tablet  Commonly known as:  NORVASC  TAKE 1 TABLET BY MOUTH EVERY DAY     aspirin 81 mg Tab  Take 81 mg by mouth. 1 Tablet Oral Every day     atorvastatin 80 MG tablet  Commonly known as:  LIPITOR  1 tablet (80 mg total) by Per G Tube route once daily.     blood sugar diagnostic Strp  Commonly known as:  Accu-Chek Ivy Plus test strp  TEST BLOOD SUGARS 4 TIMES DAILY     cefadroxil 1 gram tablet  Commonly known as:  DURICEF  TAKE 1 TABLET (1 G TOTAL) BY MOUTH 2 (TWO) TIMES DAILY.     clopidogreL 75 mg tablet  Commonly known as:  PLAVIX  Take 1 tablet (75 mg total) by mouth once daily.     escitalopram oxalate 10 MG tablet  Commonly known as:  LEXAPRO  Take 1 tablet (10 mg total) by mouth once daily.     famotidine 20 MG tablet  Commonly known as:  PEPCID  Take 1 tablet (20 mg total) by mouth 2 (two) times daily.     furosemide 40 MG tablet  Commonly known as:  LASIX  Take 1 tablet (40 mg total) by mouth once daily.     gabapentin 300 MG capsule  Commonly known as:  NEURONTIN  Take 1 capsule (300 mg total) by mouth 2 (two) times daily.     insulin aspart U-100 100 unit/mL (3 mL) Inpn pen  Commonly known as:  NovoLOG Flexpen U-100 Insulin  Inject 6 Units into the skin 3 (three) times daily with meals.     insulin glargine U-300  "conc 300 unit/mL (3 mL) Inpn  Commonly known as:  Toujeo Max U-300 SoloStar  Inject 18 Units into the skin every evening.     losartan 100 MG tablet  Commonly known as:  COZAAR  Take 1 tablet (100 mg total) by mouth once daily.     metoprolol tartrate 25 MG tablet  Commonly known as:  LOPRESSOR  Take 1 tablet (25 mg total) by mouth 2 (two) times daily.     multivitamin per tablet  Commonly known as:  THERAGRAN  Take 1 tablet by mouth once daily.     nitroGLYCERIN 0.4 MG SL tablet  Commonly known as:  NITROSTAT  Take one every 2-3 min till chestpain relief for 3 times and if still no relief, call MD or come to ED     ondansetron 8 MG Tbdl  Commonly known as:  ZOFRAN-ODT  Take 1 tablet (8 mg total) by mouth every 12 (twelve) hours as needed.     pen needle, diabetic 32 gauge x 1/5" Ndle  Commonly known as:  NOVOTWIST  Use 1 needle to inject insulin four times daily     potassium chloride SA 20 MEQ tablet  Commonly known as:  K-DUR,KLOR-CON  Take 1 tablet (20 mEq total) by mouth 2 (two) times daily.     zolpidem 5 MG Tab  Commonly known as:  AMBIEN  Take 1 tablet (5 mg total) by mouth nightly as needed.        STOP taking these medications    fluconazole 100 MG tablet  Commonly known as:  DIFLUCAN     HYDROcodone-acetaminophen 5-325 mg per tablet  Commonly known as:  NORCO     oxyCODONE-acetaminophen  mg per tablet  Commonly known as:  PERCOCET  Replaced by:  oxyCODONE-acetaminophen 5-325 mg per tablet            Time spent on the discharge of patient: 20 minutes    Christian Hyatt MD  Urology  [facility]  "

## 2020-02-21 NOTE — SUBJECTIVE & OBJECTIVE
Interval History: No acute events overnight. Pain controlled. Ambulated in the halls. UOP excellent. MADHURI drain output 280 cc over last 24 hours. Nephrostomy tube removed.    Objective:     Temp:  [97 °F (36.1 °C)-99.2 °F (37.3 °C)] 97.6 °F (36.4 °C)  Pulse:  [76-85] 76  Resp:  [14-18] 18  SpO2:  [95 %-97 %] 95 %  BP: (107-158)/(56-75) 158/75     Body mass index is 26.43 kg/m².           Drains     Drain                 Gastrostomy/Enterostomy 05/02/19 1134 Percutaneous endoscopic gastrostomy (PEG) LUQ decompression;feeding 293 days         Nephrostomy 10/07/19 1507 Left 8 Fr. 135 days         Nephrostomy 11/25/19 1052 Left 10 Fr. 86 days         Closed/Suction Drain 02/19/20 1430 Left Abdomen Bulb 15 Fr. less than 1 day         Urethral Catheter 02/19/20 1020 Non-latex 16 Fr. less than 1 day                Physical Exam   Constitutional: She is oriented to person, place, and time. She appears well-developed and well-nourished. No distress.   Eyes: No scleral icterus.   Cardiovascular: Normal rate and intact distal pulses.    Pulmonary/Chest: Effort normal. No respiratory distress.   Abdominal: Soft. Bowel sounds are normal. She exhibits no distension. There is tenderness (appropriate).   Incisions c/d/i. MADHURI with SS output.   Genitourinary:   Genitourinary Comments: Fontenot in place draining clear yellow urine   Musculoskeletal: Normal range of motion. She exhibits no edema.   Neurological: She is alert and oriented to person, place, and time.   Skin: Skin is warm and dry. No rash noted.     Psychiatric: She has a normal mood and affect. Her behavior is normal.       Significant Labs:    BMP:  Recent Labs   Lab 02/19/20  1540 02/20/20  0418 02/21/20  0359    142 143   K 3.1* 4.4 3.9    109 110   CO2 20* 24 26   BUN 21* 22* 25*   CREATININE 1.0 1.0 1.0   CALCIUM 8.2* 8.3* 8.7       CBC:   Recent Labs   Lab 02/20/20  0418 02/20/20  1659 02/21/20  0359   WBC 12.19 11.71 10.19   HGB 7.4* 9.7* 9.4*   HCT 25.0*  31.7* 30.9*    235 218       Significant Imaging:  All pertinent imaging results/findings from the past 24 hours have been reviewed.

## 2020-02-21 NOTE — DISCHARGE INSTRUCTIONS
Please record your daily drain output and bring log with you to your next clinic appointment. If output is less than 100 mL per day, you can call the clinic to have it removed. Follow-up in clinic in two weeks with cystogram prior.    Do not shower until 48 hours after surgery. Your incisions were closed with absorbable suture. In order to maintain the strength of the absorbable sutures, do not scrub incisions in the shower, but rather, let soapy water run over incisions. Do not submerge incisions in water (no baths, hot tubs, or swimming pools). Your incisions were covered with Dermabond (skin glue), which will fall off on its own. Do not lift anything heavier than 10 lbs (e.g., a gallon of milk) for 6 weeks post-op in order to prevent incisional hernias.

## 2020-02-21 NOTE — PLAN OF CARE
02/21/20 1018   Post-Acute Status   Post-Acute Authorization Home Health/Hospice   Post-Acute Placement Status Referrals Sent   Home Health/Hospice Status Referrals Sent   SW sent HH referral to [facility] via [employer] [facility].   Mariana Bran LMSW  [facility]

## 2020-02-21 NOTE — PROGRESS NOTES
[facility]  Urology  Progress Note    Patient Name: Mariann Huff  MRN: [id number]  Admission Date: 2/19/2020  Hospital Length of Stay: 2 days  Code Status: Full Code   Attending Provider: Robin Boyd MD   Primary Care Physician: Jasbir Haney MD    Subjective:     HPI:  58 yo female with hx of left ureteral injury during L5/S1 fusion, underwent primary ureteroureterostomy that was complicated with breakdown of the repair. Now s/p left ureteral reimplant with psoas hitch and boari flap on 2/19/2020.    Interval History: No acute events overnight. Pain controlled. Ambulated in the halls. Good response and H/H stable s/p 2 U PRBC. UOP excellent. MADHURI drain output 280 cc over last 24 hours. Nephrostomy tube removed.    Objective:     Temp:  [97 °F (36.1 °C)-99.2 °F (37.3 °C)] 97.6 °F (36.4 °C)  Pulse:  [76-85] 76  Resp:  [14-18] 18  SpO2:  [95 %-97 %] 95 %  BP: (107-158)/(56-75) 158/75     Body mass index is 26.43 kg/m².           Drains     Drain                 Gastrostomy/Enterostomy 05/02/19 1134 Percutaneous endoscopic gastrostomy (PEG) LUQ decompression;feeding 293 days         Nephrostomy 10/07/19 1507 Left 8 Fr. 135 days         Nephrostomy 11/25/19 1052 Left 10 Fr. 86 days         Closed/Suction Drain 02/19/20 1430 Left Abdomen Bulb 15 Fr. less than 1 day         Urethral Catheter 02/19/20 1020 Non-latex 16 Fr. less than 1 day                Physical Exam   Constitutional: She is oriented to person, place, and time. She appears well-developed and well-nourished. No distress.   Eyes: No scleral icterus.   Cardiovascular: Normal rate and intact distal pulses.    Pulmonary/Chest: Effort normal. No respiratory distress.   Abdominal: Soft. Bowel sounds are normal. She exhibits no distension. There is tenderness (appropriate).   Incisions c/d/i. MADHURI with SS output.   Genitourinary:   Genitourinary Comments: Fontenot in place draining clear yellow urine   Musculoskeletal: Normal range of motion. She  exhibits no edema.   Neurological: She is alert and oriented to person, place, and time.   Skin: Skin is warm and dry. No rash noted.     Psychiatric: She has a normal mood and affect. Her behavior is normal.       Significant Labs:    BMP:  Recent Labs   Lab 02/19/20  1540 02/20/20  0418 02/21/20  0359    142 143   K 3.1* 4.4 3.9    109 110   CO2 20* 24 26   BUN 21* 22* 25*   CREATININE 1.0 1.0 1.0   CALCIUM 8.2* 8.3* 8.7       CBC:   Recent Labs   Lab 02/20/20  0418 02/20/20  1659 02/21/20  0359   WBC 12.19 11.71 10.19   HGB 7.4* 9.7* 9.4*   HCT 25.0* 31.7* 30.9*    235 218       Significant Imaging:  All pertinent imaging results/findings from the past 24 hours have been reviewed.      Assessment/Plan:     Left ureteral injury  60 yo female with hx of left ureteral injury during L5/S1 fusion, underwent primary ureteroureterostomy that was complicated with breakdown of the repair. Now s/p left ureteral reimplant with psoas hitch and boari flap on 2/19/2020. Doing well.    - PT/OT  - Ambulate qid  - Strict I's/O's  - Drains:    - Fontenot  - maintain 2 weeks; cystogram before VT in clinic   - MADHURI - maintain until output <100 mL/day   - Stent - maintain 6 weeks   - Nephrostomy tube - removed  - Diabetic diet with impact AR supplement  - Nutrition consulted - appreciate recs  - Home meds  - Pain control  - Oxybutynin 10 for bladder spasms  - PPx: SCD's/CRISTINA's/IS/SubQ heparin held  - Dispo: Home today        VTE Risk Mitigation (From admission, onward)         Ordered     Place CRISTINA hose  Until discontinued      02/19/20 1514     Place sequential compression device  Until discontinued      02/19/20 1514     IP VTE HIGH RISK PATIENT  Once      02/19/20 1514                Christian Hyatt MD  Urology  [facility]

## 2020-02-21 NOTE — ASSESSMENT & PLAN NOTE
58 yo female with hx of left ureteral injury during L5/S1 fusion, underwent primary ureteroureterostomy that was complicated with breakdown of the repair. Now s/p left ureteral reimplant with psoas hitch and boari flap on 2/20/2020. Doing well.    - PT/OT  - Ambulate qid  - Strict I's/O's  - Drains:    - Fontenot  - maintain 2 weeks; cystogram before VT in clinic   - MADHURI - maintain until output <100 mL/day   - Stent - maintain 6 weeks   - Nephrostomy tube - removed  - Diabetic diet with impact AR supplement  - Nutrition consulted - appreciate recs  - Home meds  - Pain control  - Oxybutynin 10 for bladder spasms  - PPx: SCD's/CRISTINA's/IS/SubQ heparin held  - Dispo: Home today

## 2020-02-21 NOTE — PROGRESS NOTES
S/P ureteral reimplantation  -     FL Cystogram Minimum 3 Views; Future; Expected date: 03/05/2020    Status post placement of ureteral stent  -     FL Cystogram Minimum 3 Views; Future; Expected date: 03/05/2020    please schedule her for cystogram 2 wks from surgery and follow up with me for post op visit.

## 2020-02-21 NOTE — PLAN OF CARE
Pt AAOx4 and VSS. Pt is progressing with plan of care. Free of skin breakdown as the pt positioned/repositioned well independently. Clean, dry, and intact dressing noted on L flank. Fontenot in place. SCDs in place at all times. Incentive spirometer at bedside and pt instructed on its use. Pain controlled well with PRN meds. Frequent rounds made to assess pain and safety and no complaints at this time noted. Side rails up x 2. Bed locked. Call light within reach. No falls noted. Will continue to monitor.

## 2020-02-21 NOTE — PLAN OF CARE
02/21/20 1607   Final Note   Assessment Type Final Discharge Note   Anticipated Discharge Disposition Home   What phone number can be called within the next 1-3 days to see how you are doing after discharge? [phone]   Hospital Follow Up  Appt(s) scheduled? Yes   Discharge plans and expectations educations in teach back method with documentation complete? Yes     Future Appointments   Date Time Provider Department Center   2/24/2020  9:00 AM [facility], [facility] [place]   3/2/2020  9:40 AM Jasbir Haney MD [facility] [place]   4/9/2020  9:30 AM CHARLOTTE Doshi MD [facility] [place]   4/21/2020 10:00 AM LAB, [facility] [place]   4/28/2020 10:00 AM David Da Silva MD [facility] [place]

## 2020-02-21 NOTE — NURSING
Pt in bed resting with family at bedside. Pt denies c/o pain or n/v. D/c orders given to pt. Pt verbalized understanding. Prescriptions delivered to bedside. Removed PIV and pt tolerated well. Demostrated  pt and spouse on drain care. Both verbalized understanding. Removed drainage bag and placed leg bag. Instructed pt and spouse on catheter care. Verbalized understanding. Pt waiting for transport to vehicle via w/c.

## 2020-02-22 NOTE — ASSESSMENT & PLAN NOTE
--suspect secondary to prolonged hospitalization with critical illness and recent rectal bleeding.  --hgb 6.4 on 06/07 Transfused 2 units of PRBCs.   --No current signs of bleeding.    lower/upper

## 2020-02-25 LAB
FINAL PATHOLOGIC DIAGNOSIS: NORMAL
GROSS: NORMAL

## 2020-02-26 ENCOUNTER — TELEPHONE (OUTPATIENT)
Dept: CARDIOLOGY | Facility: CLINIC | Age: 60
End: 2020-02-26

## 2020-02-26 ENCOUNTER — LAB VISIT (OUTPATIENT)
Dept: LAB | Facility: HOSPITAL | Age: 60
End: 2020-02-26
Attending: INTERNAL MEDICINE
Payer: COMMERCIAL

## 2020-02-26 DIAGNOSIS — E11.59 HYPERTENSION ASSOCIATED WITH DIABETES: ICD-10-CM

## 2020-02-26 DIAGNOSIS — I15.2 HYPERTENSION ASSOCIATED WITH DIABETES: ICD-10-CM

## 2020-02-26 DIAGNOSIS — E11.51 TYPE 2 DIABETES MELLITUS WITH DIABETIC PERIPHERAL ANGIOPATHY WITHOUT GANGRENE, UNSPECIFIED WHETHER LONG TERM INSULIN USE: ICD-10-CM

## 2020-02-26 DIAGNOSIS — I25.10 CORONARY ARTERY DISEASE INVOLVING NATIVE CORONARY ARTERY OF NATIVE HEART WITHOUT ANGINA PECTORIS: ICD-10-CM

## 2020-02-26 DIAGNOSIS — E78.2 MIXED HYPERLIPIDEMIA: ICD-10-CM

## 2020-02-26 DIAGNOSIS — I65.23 CAROTID STENOSIS, BILATERAL: ICD-10-CM

## 2020-02-26 LAB
ALBUMIN SERPL BCP-MCNC: 3.1 G/DL (ref 3.5–5.2)
ALP SERPL-CCNC: 90 U/L (ref 55–135)
ALT SERPL W/O P-5'-P-CCNC: 12 U/L (ref 10–44)
ANION GAP SERPL CALC-SCNC: 9 MMOL/L (ref 8–16)
AST SERPL-CCNC: 12 U/L (ref 10–40)
BILIRUB SERPL-MCNC: 0.8 MG/DL (ref 0.1–1)
BUN SERPL-MCNC: 16 MG/DL (ref 6–20)
CALCIUM SERPL-MCNC: 9.3 MG/DL (ref 8.7–10.5)
CHLORIDE SERPL-SCNC: 106 MMOL/L (ref 95–110)
CHOLEST SERPL-MCNC: 154 MG/DL (ref 120–199)
CHOLEST/HDLC SERPL: 5.9 {RATIO} (ref 2–5)
CO2 SERPL-SCNC: 31 MMOL/L (ref 23–29)
CREAT SERPL-MCNC: 0.9 MG/DL (ref 0.5–1.4)
EST. GFR  (AFRICAN AMERICAN): >60 ML/MIN/1.73 M^2
EST. GFR  (NON AFRICAN AMERICAN): >60 ML/MIN/1.73 M^2
ESTIMATED AVG GLUCOSE: 169 MG/DL (ref 68–131)
GLUCOSE SERPL-MCNC: 171 MG/DL (ref 70–110)
HBA1C MFR BLD HPLC: 7.5 % (ref 4–5.6)
HDLC SERPL-MCNC: 26 MG/DL (ref 40–75)
HDLC SERPL: 16.9 % (ref 20–50)
LDLC SERPL CALC-MCNC: 93.2 MG/DL (ref 63–159)
NONHDLC SERPL-MCNC: 128 MG/DL
POTASSIUM SERPL-SCNC: 3.8 MMOL/L (ref 3.5–5.1)
PROT SERPL-MCNC: 7.3 G/DL (ref 6–8.4)
SODIUM SERPL-SCNC: 146 MMOL/L (ref 136–145)
TRIGL SERPL-MCNC: 174 MG/DL (ref 30–150)
TSH SERPL DL<=0.005 MIU/L-ACNC: 1.7 UIU/ML (ref 0.4–4)

## 2020-02-26 PROCEDURE — 84443 ASSAY THYROID STIM HORMONE: CPT

## 2020-02-26 PROCEDURE — 80053 COMPREHEN METABOLIC PANEL: CPT

## 2020-02-26 PROCEDURE — 80061 LIPID PANEL: CPT

## 2020-02-26 PROCEDURE — 83036 HEMOGLOBIN GLYCOSYLATED A1C: CPT

## 2020-02-26 PROCEDURE — 36415 COLL VENOUS BLD VENIPUNCTURE: CPT | Mod: PO

## 2020-02-26 NOTE — TELEPHONE ENCOUNTER
----- Message from Adelaide Aguilar MD sent at 2/26/2020  2:33 PM CST -----  Please mail patient the blood work results and inform the patient that we will discuss it in detail during the upcoming visit. BS is improving. LDL-C is better, but HDL-C and TG-C still not at goal.

## 2020-02-27 ENCOUNTER — TELEPHONE (OUTPATIENT)
Dept: UROLOGY | Facility: CLINIC | Age: 60
End: 2020-02-27

## 2020-02-27 NOTE — TELEPHONE ENCOUNTER
----- Message from Ramona Lambert LPN sent at 2/21/2020  2:48 PM CST -----      ----- Message -----  From: Christian Hyatt MD  Sent: 2/21/2020   6:49 AM CST  To: Oliver NICHOLSON Staff    Patient will need 2 week follow-up with Dr. Boyd with cystogram before.    Thanks,  DANISHA Hyatt MD  Urology, PGY-2  Pager: [phone]

## 2020-03-02 ENCOUNTER — OFFICE VISIT (OUTPATIENT)
Dept: INTERNAL MEDICINE | Facility: CLINIC | Age: 60
End: 2020-03-02
Payer: COMMERCIAL

## 2020-03-02 VITALS
BODY MASS INDEX: 25.78 KG/M2 | TEMPERATURE: 98 F | DIASTOLIC BLOOD PRESSURE: 62 MMHG | HEIGHT: 64 IN | WEIGHT: 151 LBS | SYSTOLIC BLOOD PRESSURE: 138 MMHG | HEART RATE: 68 BPM

## 2020-03-02 DIAGNOSIS — R05.9 COUGH: ICD-10-CM

## 2020-03-02 DIAGNOSIS — E11.69 HYPERLIPIDEMIA ASSOCIATED WITH TYPE 2 DIABETES MELLITUS: Chronic | ICD-10-CM

## 2020-03-02 DIAGNOSIS — I15.2 HYPERTENSION ASSOCIATED WITH DIABETES: Primary | Chronic | ICD-10-CM

## 2020-03-02 DIAGNOSIS — A08.4 VIRAL GASTROENTERITIS: ICD-10-CM

## 2020-03-02 DIAGNOSIS — E11.59 HYPERTENSION ASSOCIATED WITH DIABETES: Primary | Chronic | ICD-10-CM

## 2020-03-02 DIAGNOSIS — E78.5 HYPERLIPIDEMIA ASSOCIATED WITH TYPE 2 DIABETES MELLITUS: Chronic | ICD-10-CM

## 2020-03-02 PROCEDURE — 3008F BODY MASS INDEX DOCD: CPT | Mod: CPTII,S$GLB,, | Performed by: INTERNAL MEDICINE

## 2020-03-02 PROCEDURE — 99214 PR OFFICE/OUTPT VISIT, EST, LEVL IV, 30-39 MIN: ICD-10-PCS | Mod: S$GLB,,, | Performed by: INTERNAL MEDICINE

## 2020-03-02 PROCEDURE — 3051F HG A1C>EQUAL 7.0%<8.0%: CPT | Mod: CPTII,S$GLB,, | Performed by: INTERNAL MEDICINE

## 2020-03-02 PROCEDURE — 99999 PR PBB SHADOW E&M-EST. PATIENT-LVL III: ICD-10-PCS | Mod: PBBFAC,,, | Performed by: INTERNAL MEDICINE

## 2020-03-02 PROCEDURE — 99214 OFFICE O/P EST MOD 30 MIN: CPT | Mod: S$GLB,,, | Performed by: INTERNAL MEDICINE

## 2020-03-02 PROCEDURE — 3008F PR BODY MASS INDEX (BMI) DOCUMENTED: ICD-10-PCS | Mod: CPTII,S$GLB,, | Performed by: INTERNAL MEDICINE

## 2020-03-02 PROCEDURE — 3075F SYST BP GE 130 - 139MM HG: CPT | Mod: CPTII,S$GLB,, | Performed by: INTERNAL MEDICINE

## 2020-03-02 PROCEDURE — 99999 PR PBB SHADOW E&M-EST. PATIENT-LVL III: CPT | Mod: PBBFAC,,, | Performed by: INTERNAL MEDICINE

## 2020-03-02 PROCEDURE — 3075F PR MOST RECENT SYSTOLIC BLOOD PRESS GE 130-139MM HG: ICD-10-PCS | Mod: CPTII,S$GLB,, | Performed by: INTERNAL MEDICINE

## 2020-03-02 PROCEDURE — 3078F DIAST BP <80 MM HG: CPT | Mod: CPTII,S$GLB,, | Performed by: INTERNAL MEDICINE

## 2020-03-02 PROCEDURE — 3078F PR MOST RECENT DIASTOLIC BLOOD PRESSURE < 80 MM HG: ICD-10-PCS | Mod: CPTII,S$GLB,, | Performed by: INTERNAL MEDICINE

## 2020-03-02 PROCEDURE — 3051F PR MOST RECENT HEMOGLOBIN A1C LEVEL 7.0 - < 8.0%: ICD-10-PCS | Mod: CPTII,S$GLB,, | Performed by: INTERNAL MEDICINE

## 2020-03-02 NOTE — PROGRESS NOTES
Subjective:       Patient ID: Mariann Huff is a 59 y.o. female.    Chief Complaint: Follow-up    HPI     59-year-old female here for a one-month follow-up.  She is doing well post-op.  She has a catheter in place for a few more days.    HTN - Patient is currently on losartan 100 mg, Lopressor 25 mg b.i.d., amlodipine 10 mg. She does not check her BP at home. Side effects of medications note: none. Denies headaches, blurred vision, chest pain, shortness of breath, nausea.  She did not take her medication yesterday, because she did not feel well.  She was vomiting yesterday.  She has zofran at home for nausea.  She coughed all night last night.      HLD - Patient is currently on lipitor 80 mg.  Her last lipid panel was   Cholesterol   Date Value Ref Range Status   02/26/2020 154 120 - 199 mg/dL Final     Comment:     The National Cholesterol Education Program (NCEP) has set the  following guidelines (reference ranges) for Cholesterol:  Optimal.....................<200 mg/dL  Borderline High.............200-239 mg/dL  High........................> or = 240 mg/dL       Triglycerides   Date Value Ref Range Status   02/26/2020 174 (H) 30 - 150 mg/dL Final     Comment:     The National Cholesterol Education Program (NCEP) has set the  following guidelines (reference values) for triglycerides:  Normal......................<150 mg/dL  Borderline High.............150-199 mg/dL  High........................200-499 mg/dL       HDL   Date Value Ref Range Status   02/26/2020 26 (L) 40 - 75 mg/dL Final     Comment:     The National Cholesterol Education Program (NCEP) has set the  following guidelines (reference values) for HDL Cholesterol:  Low...............<40 mg/dL  Optimal...........>60 mg/dL       LDL Cholesterol   Date Value Ref Range Status   02/26/2020 93.2 63.0 - 159.0 mg/dL Final     Comment:     The National Cholesterol Education Program (NCEP) has set the  following guidelines (reference values) for LDL  Cholesterol:  Optimal.......................<130 mg/dL  Borderline High...............130-159 mg/dL  High..........................160-189 mg/dL  Very High.....................>190 mg/dL     .  Side effects of the medication: none.    Review of Systems    Objective:      Physical Exam   Constitutional: She is oriented to person, place, and time. She appears well-developed and well-nourished.   HENT:   Head: Normocephalic and atraumatic.   Mouth/Throat: No oropharyngeal exudate.   Eyes: Pupils are equal, round, and reactive to light. EOM are normal. Right eye exhibits no discharge. Left eye exhibits no discharge. No scleral icterus.   Neck: Normal range of motion. Neck supple. No tracheal deviation present. No thyromegaly present.   Cardiovascular: Normal rate, regular rhythm and normal heart sounds. Exam reveals no gallop and no friction rub.   No murmur heard.  Pulmonary/Chest: Effort normal and breath sounds normal. No respiratory distress. She has no wheezes. She has no rales. She exhibits no tenderness.   Abdominal: Soft. Bowel sounds are normal. She exhibits no distension and no mass. There is no tenderness. There is no rebound and no guarding.   Musculoskeletal: Normal range of motion. She exhibits no edema or tenderness.   Neurological: She is alert and oriented to person, place, and time.   Skin: Skin is warm and dry. No rash noted. No erythema. No pallor.   Psychiatric: She has a normal mood and affect. Her behavior is normal.   Vitals reviewed.      Assessment:       1. Hypertension associated with diabetes    2. Hyperlipidemia associated with type 2 diabetes mellitus    3. Viral gastroenteritis    4. Cough        Plan:       1.  Continue Norvasc 10 mg, losartan 100 mg, Lopressor 25 mg b.i.d.  2.  Continue Lipitor 80 mg  3.  Patient has Zofran to use as needed.  4.  Likely a viral infection.  Patient counseled to monitor and use Flonase, antihistamines, Tylenol, NSAIDs as needed.

## 2020-03-03 ENCOUNTER — PATIENT OUTREACH (OUTPATIENT)
Dept: ADMINISTRATIVE | Facility: OTHER | Age: 60
End: 2020-03-03

## 2020-03-03 ENCOUNTER — TELEPHONE (OUTPATIENT)
Dept: UROLOGY | Facility: CLINIC | Age: 60
End: 2020-03-03

## 2020-03-03 DIAGNOSIS — R10.9 LEFT FLANK PAIN: Primary | ICD-10-CM

## 2020-03-03 DIAGNOSIS — Z96.0 URETERAL STENT RETAINED: ICD-10-CM

## 2020-03-03 RX ORDER — HYOSCYAMINE SULFATE 0.12 MG/1
0.12 TABLET SUBLINGUAL EVERY 4 HOURS PRN
Qty: 30 TABLET | Refills: 1 | Status: SHIPPED | OUTPATIENT
Start: 2020-03-03 | End: 2020-06-18

## 2020-03-03 NOTE — TELEPHONE ENCOUNTER
Left flank pain  -     X-Ray Abdomen AP 1 View; Future; Expected date: 03/03/2020  -     hyoscyamine (LEVSIN/SL) 0.125 mg Subl; Place 1 tablet (0.125 mg total) under the tongue every 4 (four) hours as needed.  Dispense: 30 tablet; Refill: 1    Ureteral stent retained  -     X-Ray Abdomen AP 1 View; Future; Expected date: 03/03/2020  -     Basic metabolic panel; Future; Expected date: 03/03/2020  -     hyoscyamine (LEVSIN/SL) 0.125 mg Subl; Place 1 tablet (0.125 mg total) under the tongue every 4 (four) hours as needed.  Dispense: 30 tablet; Refill: 1    her pain may be due to left ureteral stent in place.  Will check the left ureteral stent position with KUB.  Do BMP and urine culture when she comes tomorrow to see an INDERJIT.    She can take Levsin SL 0.125 mg q 4 hours prn for bladder spasm, in addition to oxybutynin xl 10 mg daily.

## 2020-03-03 NOTE — TELEPHONE ENCOUNTER
----- Message from Amber Mayer LPN sent at 3/3/2020  3:21 PM CST -----  States she has a constant throbbing pain on left side, patient advised that she has a stent. She is taking ditpropan 10 mg daily   ----- Message -----  From: Greta Glass  Sent: 3/3/2020   2:46 PM CST  To: Oliver NICHOLSON Staff    Pt is called state she is have left side pain and is not sure why.  She stated she feel the pain and soreness around the area where Dr. Boyd did procedure.  Call back # [phone]   [id number] - [place] - [address] AT [address] [facility]  [address]  [address]   [place] [address]  Phone: [phone] Fax: [phone]

## 2020-03-06 ENCOUNTER — OFFICE VISIT (OUTPATIENT)
Dept: UROLOGY | Facility: CLINIC | Age: 60
End: 2020-03-06
Payer: COMMERCIAL

## 2020-03-06 ENCOUNTER — TELEPHONE (OUTPATIENT)
Dept: UROLOGY | Facility: CLINIC | Age: 60
End: 2020-03-06

## 2020-03-06 ENCOUNTER — HOSPITAL ENCOUNTER (OUTPATIENT)
Dept: RADIOLOGY | Facility: HOSPITAL | Age: 60
Discharge: HOME | End: 2020-03-06
Attending: UROLOGY
Payer: COMMERCIAL

## 2020-03-06 VITALS
WEIGHT: 148.13 LBS | SYSTOLIC BLOOD PRESSURE: 146 MMHG | BODY MASS INDEX: 25.29 KG/M2 | DIASTOLIC BLOOD PRESSURE: 58 MMHG | HEIGHT: 64 IN | HEART RATE: 82 BPM

## 2020-03-06 DIAGNOSIS — R10.9 LEFT FLANK PAIN: ICD-10-CM

## 2020-03-06 DIAGNOSIS — R10.30 LOWER ABDOMINAL PAIN: Primary | ICD-10-CM

## 2020-03-06 DIAGNOSIS — Z96.0 URETERAL STENT RETAINED: ICD-10-CM

## 2020-03-06 PROCEDURE — 74018 RADEX ABDOMEN 1 VIEW: CPT | Mod: 26,,, | Performed by: RADIOLOGY

## 2020-03-06 PROCEDURE — 99024 POSTOP FOLLOW-UP VISIT: CPT | Mod: S$GLB,,, | Performed by: PHYSICIAN ASSISTANT

## 2020-03-06 PROCEDURE — 87077 CULTURE AEROBIC IDENTIFY: CPT

## 2020-03-06 PROCEDURE — 87088 URINE BACTERIA CULTURE: CPT

## 2020-03-06 PROCEDURE — 99024 PR POST-OP FOLLOW-UP VISIT: ICD-10-PCS | Mod: S$GLB,,, | Performed by: PHYSICIAN ASSISTANT

## 2020-03-06 PROCEDURE — 99999 PR PBB SHADOW E&M-EST. PATIENT-LVL IV: ICD-10-PCS | Mod: PBBFAC,,, | Performed by: PHYSICIAN ASSISTANT

## 2020-03-06 PROCEDURE — 74018 XR ABDOMEN AP 1 VIEW: ICD-10-PCS | Mod: 26,,, | Performed by: RADIOLOGY

## 2020-03-06 PROCEDURE — 87086 URINE CULTURE/COLONY COUNT: CPT

## 2020-03-06 PROCEDURE — 87186 SC STD MICRODIL/AGAR DIL: CPT

## 2020-03-06 PROCEDURE — 74018 RADEX ABDOMEN 1 VIEW: CPT | Mod: TC

## 2020-03-06 PROCEDURE — 99999 PR PBB SHADOW E&M-EST. PATIENT-LVL IV: CPT | Mod: PBBFAC,,, | Performed by: PHYSICIAN ASSISTANT

## 2020-03-06 NOTE — TELEPHONE ENCOUNTER
----- Message from Robin Boyd MD sent at 3/6/2020 11:50 AM CST -----  Please call the patient regarding abnormal test results.  Make sure that your diabetes is well controlled please.

## 2020-03-06 NOTE — TELEPHONE ENCOUNTER
I spoke with patient and gave 's message :         Please call the patient regarding abnormal test results.   Make sure that your diabetes is well controlled please

## 2020-03-06 NOTE — PROGRESS NOTES
CHIEF COMPLAINT:    Mrs. Huff is a 59 y.o. female presenting for abdominal pain.  PRESENTING ILLNESS:    Mariann Huff is a 59 y.o. female  who presents for abdominal pain.   She reports left lower abdominal pain.  The pain is constant.  She started taking levsin which has helped.   She is taking oxybuynin as well.    She reports urine leaking around her catheter when she is having a bowel movement.  She had a KUB and BMP done today.  Results are still pending.    She hasnt emptied her drain in 3 days.  She has <25cc in her drain.  Her catheter has been draining well.     She had the following procedure on 02/19/2020      Procedure(s) Performed:   1.  Boari flap with left ureteral reimplant  2.  Left ureteral stent placement  3.  Psoas hitch  4.  Lysis of adhesions      Specimen(s): left ureter     Staff Surgeon: Robin Boyd MD    Indications: Mariann Huff is a 59 y.o. female with a hx of left ureteral injury during L5/S1 fusion. She underwent primary ureteroureterostomy at that time. 1 week later she presented septic with an intra-abdominal abscess secondary to breakdown of the repair. At that time she underwent ex lap with clipping of the ureter and neph tube placement. Her post op course was severely complicated with a prolonged ICU stay. She has now recovered from this and is ready to undergo repair of her left ureter.      Findings:    1.  Severe adhesions of small bowel and colon in left lower quadrant   2.  Ureter identified above the pelvic brim and tracked distally, at location of injury there was severe fibrosis and inability to dissect the ureter further, transected proximal to this  3.  Bladder identified and mobilized from superficial and peritoneal attachments  4.  Boari flap with psoas hitch performed with tension free anastomosis  5.  Leak test performed and confirmed negative    Drains:   1.  6 Fr x 24 cm left ureteral stent  2.  16 Fr correa catheter  3.  15 mm robert drain     REVIEW OF  SYSTEMS:      Constitutional: Negative for fever and chills.   HENT: Negative for hearing loss.   Eyes: Negative for visual disturbance.   Respiratory: Negative for shortness of breath.   Cardiovascular: Negative for chest pain.   Gastrointestinal: Negative for vomiting, and constipation.   Genitourinary:  See HPI  Neurological: Negative for dizziness.   Hematological: Does not bruise/bleed easily.   Psychiatric/Behavioral: Negative for confusion.     PATIENT HISTORY:    Past Medical History:   Diagnosis Date    Anticoagulant long-term use     Asthma     Back pain     Bradycardia, unspecified 5/8/2019    The etiology of the bradycardic episode is unclear.  The have appear to be respiratory in origin (at least the 1st episode).  We will continue to monitor carefully.  We are awaiting evaluation by Cardiology.     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, with long-term current use of insulin 1/9/2020       Past Surgical History:   Procedure Laterality Date    ANTEGRADE NEPHROSTOGRAPHY Left 12/11/2019    Procedure: Nephrostogram - antegrade;  Surgeon: Robin Boyd MD;  Location: [address];  Service: Urology;  Laterality: Left;    BRONCHOSCOPY N/A 5/2/2019    Procedure: BRONCHOSCOPY;  Surgeon: Sean Ruano MD;  Location: [address];  Service: General;  Laterality: N/A;    CARDIAC CATHETERIZATION      CATARACT EXTRACTION      cataract extraction left eye       cataracts      CAUDAL EPIDURAL STEROID INJECTION N/A 2019    Procedure: Injection-steroid-epidural-caudal;  Surgeon: Dave Bentley Jr., MD;  Location: [facility] PAIN MGT;  Service: Pain Management;  Laterality: N/A;     SECTION, LOW TRANSVERSE      COLONOSCOPY N/A 2019    Procedure: COLONOSCOPY;  Surgeon: Al Alaniz MD;  Location: [facility] ENDO (2ND FLR);  Service: Endoscopy;  Laterality: N/A;    CORONARY ANGIOPLASTY      CYSTOGRAM N/A 2019    Procedure: CYSTOGRAM INTRAOP;  Surgeon: Robin Boyd MD;  [facility];  Service: Urology;  Laterality: N/A;  1 HOUR    CYSTOSCOPY W/ RETROGRADES Left 2019    Procedure: CYSTOSCOPY, WITH RETROGRADE PYELOGRAM;  Surgeon: Robin Boyd MD;  [facility];  Service: Urology;  Laterality: Left;  fluro    ESOPHAGOGASTRODUODENOSCOPY W/ PEG  2019    Procedure: EGD, WITH PEG TUBE INSERTION;  Surgeon: Sean Ruano MD;  [address];  Service: General;;    EXCISION TURBINATE, SUBMUCOUS      FLEXIBLE SIGMOIDOSCOPY N/A 2019    Procedure: SIGMOIDOSCOPY, FLEXIBLE;  Surgeon: ALBERTO Amin MD;  [facility]  Service: Endoscopy;  Laterality: N/A;    FLEXIBLE SIGMOIDOSCOPY N/A 2019    Procedure: SIGMOIDOSCOPY, FLEXIBLE;  Surgeon: ALBERTO Amin MD;  [facility]  Service: Endoscopy;  Laterality: N/A;    FUSION OF LUMBAR SPINE BY ANTERIOR APPROACH Left 2019    Procedure: FUSION, SPINE, LUMBAR, ANTERIOR APPROACH Left L5-S1 Anterior to Psoa Interbody Fusion, L5-S1 Posterior Instrumentation;  Surgeon: Mk George MD;  [facility];  Service: Neurosurgery;  Laterality: Left;  Porcedure:  Left L5-S1 Anterior to Psoa Interbody Fusion, L5-S1 Posterior Instrumentation  Surgery Time: 4 Hrs  LOS: 2-3  Anesthesia: General   Blood: Type & Screen  R    HAND SURGERY Left     HAND SURGERY Right     torn ligament repair/ Dr. Yeboah     HYSTERECTOMY      left foot surgery      left wrist surgery      LYSIS OF ADHESIONS N/A 2/19/2020    Procedure: LYSIS, ADHESIONS;  Surgeon: Robin Boyd MD;  [facility];  Service: Urology;  Laterality: N/A;    NASAL SEPTUM SURGERY  5/7/15    PERCUTANEOUS NEPHROSTOMY Left 4/21/2019    Procedure: Creation, Nephrostomy, Percutaneous;  Surgeon: Kairna Surgeon;  Location: [facility] KARINA;  Service: Anesthesiology;  Laterality: Left;    REPAIR OF URETER  4/12/2019    Procedure: REPAIR, URETER;  Surgeon: Mk George MD;  [facility];  Service: Neurosurgery;;    REPLACEMENT OF NEPHROSTOMY TUBE N/A 7/18/2019    Procedure: REPLACEMENT, NEPHROSTOMY TUBE;  Surgeon: [facility] Diagnostic Provider;  [address]  Service: Anesthesiology;  Laterality: N/A;  188    REPLACEMENT OF NEPHROSTOMY TUBE N/A 7/24/2019    Procedure: REPLACEMENT, NEPHROSTOMY TUBE;  Surgeon: [facility] Diagnostic Provider;  [facility];  Service: Anesthesiology;  Laterality: N/A;  188    REPLACEMENT OF NEPHROSTOMY TUBE N/A 10/7/2019    Procedure: REPLACEMENT, NEPHROSTOMY TUBE;  Surgeon: [facility] Diagnostic Provider;  [facility];  Service: Anesthesiology;  Laterality: N/A;  189    REPLACEMENT OF NEPHROSTOMY TUBE N/A 11/25/2019    Procedure: REPLACEMENT, NEPHROSTOMY TUBE;  Surgeon: [facility] Diagnostic Provider;  Location: [facility] OR [address] [place] [address];  Service: Anesthesiology;  Laterality: N/A;  Room 188/Bessy    REPLACEMENT OF NEPHROSTOMY TUBE Right 2/19/2020    Procedure: REPLACEMENT, NEPHROSTOMY TUBE removal removal;  Surgeon: Robin Boyd MD;  Location: [address];  Service: Urology;  Laterality: Right;    rt elbow surgery      S/P LAD COATED STENT  05/14/2010    6 total     S/P OM1 STENT  08/2003    SINUS SURGERY      F.E.S.S.    TRACHEOSTOMY N/A 5/2/2019    Procedure: CREATION, TRACHEOSTOMY;  Surgeon: Sean Ruano MD;  Location: [facility] OR [address] [place] [address];  Service: General;  Laterality: N/A;    TUBAL  LIGATION      URETERAL REIMPLANTION Left 2/19/2020    Procedure: REIMPLANTATION, URETER WITH PSOAS HITCH;  Surgeon: Robin Boyd MD;  Location: [facility] OR [address] [place] [address];  Service: Urology;  Laterality: Left;       Family History   Problem Relation Age of Onset    Diabetes Mother     Heart disease Mother     Diabetes Father     Leukemia Father         leukemia    Heart attack Father     Diabetes Sister     Diabetes Brother     Diabetes Sister     No Known Problems Sister     No Known Problems Brother     No Known Problems Brother     No Known Problems Maternal Grandmother     No Known Problems Maternal Grandfather     No Known Problems Paternal Grandmother     No Known Problems Paternal Grandfather     No Known Problems Son     No Known Problems Son     No Known Problems Maternal Aunt     No Known Problems Maternal Uncle     No Known Problems Paternal Aunt     No Known Problems Paternal Uncle     Colon cancer Neg Hx     Inflammatory bowel disease Neg Hx     Melanoma Neg Hx     Psoriasis Neg Hx     Lupus Neg Hx     Eczema Neg Hx     Acne Neg Hx     Amblyopia Neg Hx     Blindness Neg Hx     Cancer Neg Hx     Cataracts Neg Hx     Glaucoma Neg Hx     Hypertension Neg Hx     Macular degeneration Neg Hx     Retinal detachment Neg Hx     Strabismus Neg Hx     Stroke Neg Hx     Thyroid disease Neg Hx     Heart failure Neg Hx     Hyperlipidemia Neg Hx        Social History     Socioeconomic History    Marital status:      Spouse name: Shamir    Number of children: 2    Years of education: Not on file    Highest education level: Not on file   Occupational History    Occupation: cafeteria     Employer: [employer]     Employer: [employer]     Employer: [employer]   Social Needs    Financial resource strain: Not on file    Food insecurity:     Worry: Not on file     Inability: Not on file    Transportation needs:     Medical: Not on file      Non-medical: Not on file   Tobacco Use    Smoking status: Never Smoker    Smokeless tobacco: Never Used   Substance and Sexual Activity    Alcohol use: No     Alcohol/week: 0.0 standard drinks    Drug use: No    Sexual activity: Yes     Partners: Male     Birth control/protection: Post-menopausal     Comment:    Lifestyle    Physical activity:     Days per week: Not on file     Minutes per session: Not on file    Stress: Not on file   Relationships    Social connections:     Talks on phone: Not on file     Gets together: Not on file     Attends [employer] service: Not on file     Active member of club or organization: Not on file     Attends meetings of clubs or organizations: Not on file     Relationship status: Not on file   Other Topics Concern    Are you pregnant or think you may be? No    Breast-feeding No   Social History Narrative    . 2 children.        Allergies:  Milk containing products    Medications:    Current Outpatient Medications:     ACCU-CHEK EDIN PLUS METER Misc, , Disp: , Rfl:     albuterol (PROVENTIL/VENTOLIN HFA) 90 mcg/actuation inhaler, Inhale 2 puffs into the lungs every 6 (six) hours as needed for Wheezing., Disp: 1 g, Rfl: 3    albuterol-ipratropium (DUO-NEB) 2.5 mg-0.5 mg/3 mL nebulizer solution, Inhale 3 mLs into the lungs., Disp: , Rfl:     amLODIPine (NORVASC) 10 MG tablet, TAKE 1 TABLET BY MOUTH EVERY DAY, Disp: 90 tablet, Rfl: 2    aspirin 81 mg Tab, Take 81 mg by mouth. 1 Tablet Oral Every day, Disp: , Rfl:     atorvastatin (LIPITOR) 80 MG tablet, 1 tablet (80 mg total) by Per G Tube route once daily., Disp: 90 tablet, Rfl: 3    blood sugar diagnostic (ACCU-CHEK EDIN PLUS TEST STRP) Strp, TEST BLOOD SUGARS 4 TIMES DAILY, Disp: 150 strip, Rfl: 11    blood-glucose sensor (DEXCOM G6 SENSOR) Leann, 3 each by Misc.(Non-Drug; Combo Route) route continuous prn., Disp: 3 each, Rfl: PRN    blood-glucose transmitter (DEXCOM G6 TRANSMITTER) Leann, 1 each by  Misc.(Non-Drug; Combo Route) route continuous prn., Disp: 1 each, Rfl: PRN    cefadroxil (DURICEF) 1 gram tablet, TAKE 1 TABLET (1 G TOTAL) BY MOUTH 2 (TWO) TIMES DAILY., Disp: , Rfl: 3    clopidogrel (PLAVIX) 75 mg tablet, Take 1 tablet (75 mg total) by mouth once daily., Disp: 90 tablet, Rfl: 3    escitalopram oxalate (LEXAPRO) 10 MG tablet, Take 1 tablet (10 mg total) by mouth once daily., Disp: 30 tablet, Rfl: 11    famotidine (PEPCID) 20 MG tablet, Take 1 tablet (20 mg total) by mouth 2 (two) times daily., Disp: 60 tablet, Rfl: 11    furosemide (LASIX) 40 MG tablet, Take 1 tablet (40 mg total) by mouth once daily., Disp: 30 tablet, Rfl: 11    gabapentin (NEURONTIN) 300 MG capsule, Take 1 capsule (300 mg total) by mouth 2 (two) times daily., Disp: 60 capsule, Rfl: 11    hyoscyamine (LEVSIN/SL) 0.125 mg Subl, Place 1 tablet (0.125 mg total) under the tongue every 4 (four) hours as needed., Disp: 30 tablet, Rfl: 1    insulin aspart U-100 (NOVOLOG FLEXPEN U-100 INSULIN) 100 unit/mL (3 mL) InPn pen, Inject 6 Units into the skin 3 (three) times daily with meals., Disp: 6 mL, Rfl: 5    insulin glargine U-300 conc (TOUJEO MAX U-300 SOLOSTAR) 300 unit/mL (3 mL) InPn, Inject 18 Units into the skin every evening., Disp: 6 mL, Rfl: 5    losartan (COZAAR) 100 MG tablet, Take 1 tablet (100 mg total) by mouth once daily., Disp: 90 tablet, Rfl: 3    metoprolol tartrate (LOPRESSOR) 25 MG tablet, Take 1 tablet (25 mg total) by mouth 2 (two) times daily., Disp: 60 tablet, Rfl: 11    multivitamin (THERAGRAN) per tablet, Take 1 tablet by mouth once daily., Disp: , Rfl:     nitrofurantoin (MACRODANTIN) 50 MG capsule, Take 1 capsule (50 mg total) by mouth once daily., Disp: 7 capsule, Rfl: 0    nitroGLYCERIN (NITROSTAT) 0.4 MG SL tablet, Take one every 2-3 min till chestpain relief for 3 times and if still no relief, call MD or come to ED, Disp: 30 tablet, Rfl: 11    ondansetron (ZOFRAN-ODT) 8 MG TbDL, Take 1 tablet (8  "mg total) by mouth every 12 (twelve) hours as needed., Disp: 30 tablet, Rfl: 2    oxybutynin (DITROPAN-XL) 5 MG TR24, Take 2 tablets (10 mg total) by mouth once daily., Disp: 60 tablet, Rfl: 0    oxyCODONE-acetaminophen (PERCOCET) 5-325 mg per tablet, Take 1 tablet by mouth every 6 (six) hours as needed for Pain., Disp: 11 tablet, Rfl: 0    pen needle, diabetic (NOVOTWIST) 32 gauge x 1/5" Ndle, Use 1 needle to inject insulin four times daily, Disp: 400 each, Rfl: 3    polyethylene glycol (GLYCOLAX) 17 gram PwPk, Mix 1 capful (17 g) in liquid and take by mouth once daily., Disp: 14 each, Rfl: 0    potassium chloride SA (K-DUR,KLOR-CON) 20 MEQ tablet, Take 1 tablet (20 mEq total) by mouth 2 (two) times daily., Disp: 60 tablet, Rfl: 11    zolpidem (AMBIEN) 5 MG Tab, Take 1 tablet (5 mg total) by mouth nightly as needed., Disp: 30 tablet, Rfl: 2  No current facility-administered medications for this visit.     Facility-Administered Medications Ordered in Other Visits:     lidocaine (PF) 10 mg/ml (1%) injection 10 mg, 1 mL, Intradermal, Once, Dave Bentley Jr., MD    PHYSICAL EXAMINATION:    Constitutional: She appears well-developed and well-nourished.  She is in no apparent distress.    Eyes: No scleral icterus noted bilaterally. No discharge bilaterally.    Nose: No rhinorrhea    Cardiovascular: Normal rate.  No pitting edema noted in lower extremities bilaterally    Pulmonary/Chest: Effort normal. No respiratory distress.     Abdominal:  She exhibits no distension.      Neurological: She is alert and oriented to person, place, and time.     Skin: Skin is warm and dry.     Psych: Cooperative with normal affect.    <25cc of serosanguineous fluid in drain.  Yellow urine noted in leg bag.     Physical Exam      LABS:        IMPRESSION:    Encounter Diagnoses   Name Primary?    Lower abdominal pain Yes    Ureteral stent retained        PLAN:    Urine culture  Continue levsin  Drink plenty of water  Eat a well " balanced diabetic diet   Drain removed in its entirety.  Patient tolerated removal well.  Dressing applied.      Follow up next week with Dr. Boyd following cystogram.     Edelmira Mullen PA-C

## 2020-03-06 NOTE — LETTER
March 7, 2020      Jasbir Haney MD  [address] [facility] [place] [address]           [facility] - Urology 4th Floor  [address]  [place] [address]  Phone: [phone]          Patient: Mariann Huff   MR Number: [id number]   YOB: 1960   Date of Visit: 3/6/2020       Dear Dr. Jasbir Haney:    Thank you for referring Mariann Huff to me for evaluation. Attached you will find relevant portions of my assessment and plan of care.    If you have questions, please do not hesitate to call me. I look forward to following Mariann Huff along with you.    Sincerely,    Edelmira Mullen PA-C    Enclosure  CC:  No Recipients    If you would like to receive this communication electronically, please contact [email or web address] or [phone] to request more information on [employer] Link access.    For providers and/or their staff who would like to refer a patient to [facility], please contact us through our one-stop-shop provider referral line, Esmer Campbell, at [phone].    If you feel you have received this communication in error or would no longer like to receive these types of communications, please e-mail [email or web address]

## 2020-03-09 DIAGNOSIS — A49.9 BACTERIAL UTI: Primary | ICD-10-CM

## 2020-03-09 DIAGNOSIS — N39.0 BACTERIAL UTI: Primary | ICD-10-CM

## 2020-03-09 LAB — BACTERIA UR CULT: ABNORMAL

## 2020-03-09 RX ORDER — NITROFURANTOIN 25; 75 MG/1; MG/1
100 CAPSULE ORAL 2 TIMES DAILY
Qty: 14 CAPSULE | Refills: 0 | Status: SHIPPED | OUTPATIENT
Start: 2020-03-09 | End: 2020-03-16

## 2020-03-09 NOTE — PROGRESS NOTES
Patient notified of urine culture results.  Macrobid sent to pharmacy of choice.  Instructions given on how to take medication.

## 2020-03-11 ENCOUNTER — TELEPHONE (OUTPATIENT)
Dept: RADIOLOGY | Facility: HOSPITAL | Age: 60
End: 2020-03-11

## 2020-03-12 ENCOUNTER — HOSPITAL ENCOUNTER (OUTPATIENT)
Dept: RADIOLOGY | Facility: HOSPITAL | Age: 60
Discharge: HOME | End: 2020-03-12
Attending: UROLOGY
Payer: COMMERCIAL

## 2020-03-12 ENCOUNTER — OFFICE VISIT (OUTPATIENT)
Dept: UROLOGY | Facility: CLINIC | Age: 60
End: 2020-03-12
Payer: COMMERCIAL

## 2020-03-12 DIAGNOSIS — N39.0 URINARY TRACT INFECTION ASSOCIATED WITH INDWELLING URETHRAL CATHETER, INITIAL ENCOUNTER: Primary | ICD-10-CM

## 2020-03-12 DIAGNOSIS — T83.511A URINARY TRACT INFECTION ASSOCIATED WITH INDWELLING URETHRAL CATHETER, INITIAL ENCOUNTER: Primary | ICD-10-CM

## 2020-03-12 DIAGNOSIS — Z98.890 S/P URETERAL REIMPLANTATION: ICD-10-CM

## 2020-03-12 DIAGNOSIS — Z96.0 STATUS POST PLACEMENT OF URETERAL STENT: ICD-10-CM

## 2020-03-12 PROCEDURE — 74430 CONTRAST X-RAY BLADDER: CPT | Mod: 26,,, | Performed by: RADIOLOGY

## 2020-03-12 PROCEDURE — 51600 INJECTION FOR BLADDER X-RAY: CPT | Mod: ,,, | Performed by: RADIOLOGY

## 2020-03-12 PROCEDURE — 99499 UNLISTED E&M SERVICE: CPT | Mod: S$GLB,,, | Performed by: UROLOGY

## 2020-03-12 PROCEDURE — 99999 PR PBB SHADOW E&M-EST. PATIENT-LVL II: ICD-10-PCS | Mod: PBBFAC,,, | Performed by: UROLOGY

## 2020-03-12 PROCEDURE — 99499 NO LOS: ICD-10-PCS | Mod: S$GLB,,, | Performed by: UROLOGY

## 2020-03-12 PROCEDURE — 74430 FL CYSTOGRAM MIN 3 VIEWS RADIOLOGIST PERFORMED: ICD-10-PCS | Mod: 26,,, | Performed by: RADIOLOGY

## 2020-03-12 PROCEDURE — 99999 PR PBB SHADOW E&M-EST. PATIENT-LVL II: CPT | Mod: PBBFAC,,, | Performed by: UROLOGY

## 2020-03-12 PROCEDURE — 25500020 PHARM REV CODE 255: Performed by: UROLOGY

## 2020-03-12 PROCEDURE — 51600 FL CYSTOGRAM MIN 3 VIEWS RADIOLOGIST PERFORMED: ICD-10-PCS | Mod: ,,, | Performed by: RADIOLOGY

## 2020-03-12 PROCEDURE — 51600 INJECTION FOR BLADDER X-RAY: CPT

## 2020-03-12 RX ORDER — DOXYCYCLINE 100 MG/1
100 CAPSULE ORAL 2 TIMES DAILY
Qty: 14 CAPSULE | Refills: 0 | Status: SHIPPED | OUTPATIENT
Start: 2020-03-12 | End: 2020-03-19

## 2020-03-12 RX ORDER — LIDOCAINE HYDROCHLORIDE 20 MG/ML
JELLY TOPICAL ONCE
Status: CANCELLED | OUTPATIENT
Start: 2020-03-12 | End: 2020-03-12

## 2020-03-12 RX ORDER — NITROFURANTOIN (MACROCRYSTALS) 100 MG/1
100 CAPSULE ORAL NIGHTLY
Qty: 30 CAPSULE | Refills: 0 | Status: SHIPPED | OUTPATIENT
Start: 2020-03-12 | End: 2020-08-06

## 2020-03-12 RX ORDER — DOXYCYCLINE HYCLATE 100 MG
100 TABLET ORAL ONCE
Status: CANCELLED | OUTPATIENT
Start: 2020-03-12 | End: 2020-03-12

## 2020-03-12 RX ADMIN — IOTHALAMATE MEGLUMINE 250 ML: 172 INJECTION URETERAL at 01:03

## 2020-03-12 NOTE — LETTER
March 12, 2020      Jasbir Haney MD  [address] [facility] [place] [address]           [facility] - Urology 4th Floor  [address]  [place] [address]  Phone: [phone]          Patient: Mariann Huff   MR Number: [id number]   YOB: 1960   Date of Visit: 3/12/2020       Dear Dr. Jasbir Haney:    Thank you for referring Mariann Huff to me for evaluation. Attached you will find relevant portions of my assessment and plan of care.    If you have questions, please do not hesitate to call me. I look forward to following Mariann Huff along with you.    Sincerely,    Robin Boyd MD    Enclosure  CC:  No Recipients    If you would like to receive this communication electronically, please contact [email or web address] or [phone] to request more information on [employer] Link access.    For providers and/or their staff who would like to refer a patient to [facility], please contact us through our one-stop-shop provider referral line, [facility], at [phone].    If you feel you have received this communication in error or would no longer like to receive these types of communications, please e-mail [email or web address]

## 2020-03-12 NOTE — PROGRESS NOTES
CHIEF COMPLAINT:    Mrs. Huff is a 59 y.o. female presenting following cystogram.  PRESENTING ILLNESS:    Mariann Huff is a 59 y.o. female with s/p Borary flap with left ureteral reimplant on 2/19/20.    a hx of left ureteral injury during L5/S1 fusion. She underwent primary ureteroureterostomy at that time. 1 week later she presented septic with an intra-abdominal abscess secondary to breakdown of the repair. At that time she underwent ex lap with clipping of the ureter and neph tube placement. Her post op course was severely complicated with a prolonged ICU stay. She has now recovered from this and is ready to undergo repair of her left ureter.     Her correa catheter has been draining well. Had cystogram today.    She had the following procedure on 02/19/2020  Procedure(s) Performed:   1.  Boari flap with left ureteral reimplant  2.  Left ureteral stent placement  3.  Psoas hitch  4.  Lysis of adhesions   Findings:    1.  Severe adhesions of small bowel and colon in left lower quadrant   2.  Ureter identified above the pelvic brim and tracked distally, at location of injury there was severe fibrosis and inability to dissect the ureter further, transected proximal to this  3.  Bladder identified and mobilized from superficial and peritoneal attachments  4.  Boari flap with psoas hitch performed with tension free anastomosis  5.  Leak test performed and confirmed negative    Drains:   1.  6 Fr x 24 cm left ureteral stent  2.  16 Fr correa catheter  3.  15 mm robert drain     REVIEW OF SYSTEMS:      Constitutional: Negative for fever and chills.   HENT: Negative for hearing loss.   Eyes: Negative for visual disturbance.   Respiratory: Negative for shortness of breath.   Cardiovascular: Negative for chest pain.   Gastrointestinal: Negative for vomiting, and constipation.   Genitourinary:  See HPI  Neurological: Negative for dizziness.   Hematological: Does not bruise/bleed easily.   Psychiatric/Behavioral: Negative  for confusion.     PATIENT HISTORY:    Past Medical History:   Diagnosis Date    Anticoagulant long-term use     Asthma     Back pain     Bradycardia, unspecified 2019    The etiology of the bradycardic episode is unclear.  The have appear to be respiratory in origin (at least the 1st episode).  We will continue to monitor carefully.  We are awaiting evaluation by Cardiology.     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, with long-term current use of insulin 2020       Past Surgical History:   Procedure Laterality Date    ANTEGRADE NEPHROSTOGRAPHY Left 2019    Procedure: Nephrostogram - antegrade;  Surgeon: Robin Boyd MD;  Location: [facility] OR [address] [place] [address];  Service: Urology;  Laterality: Left;    BRONCHOSCOPY N/A 2019    Procedure: BRONCHOSCOPY;  Surgeon: Sean Ruano MD;  Location: [facility] OR [address] [place] [address];  Service: General;  Laterality: N/A;    CARDIAC CATHETERIZATION      CATARACT EXTRACTION      cataract extraction left eye      cataracts      CAUDAL EPIDURAL STEROID INJECTION N/A 2019    Procedure: Injection-steroid-epidural-caudal;  Surgeon: Dave Bentley Jr., MD;  Location: [facility] PAIN MGT;  Service: Pain Management;  Laterality: N/A;     SECTION, LOW TRANSVERSE      COLONOSCOPY N/A 2019    Procedure: COLONOSCOPY;  Surgeon: Al Alaniz MD;  Location: [facility] ENDO ([address] [place] [address]);  Service: Endoscopy;  Laterality: N/A;    CORONARY  ANGIOPLASTY      CYSTOGRAM N/A 12/11/2019    Procedure: CYSTOGRAM INTRAOP;  Surgeon: Robin Boyd MD;  [facility];  Service: Urology;  Laterality: N/A;  1 HOUR    CYSTOSCOPY W/ RETROGRADES Left 12/11/2019    Procedure: CYSTOSCOPY, WITH RETROGRADE PYELOGRAM;  Surgeon: Robin Boyd MD;  Location: [facility] OR [address] [place] [address];  Service: Urology;  Laterality: Left;  fluro    ESOPHAGOGASTRODUODENOSCOPY W/ PEG  5/2/2019    Procedure: EGD, WITH PEG TUBE INSERTION;  Surgeon: Sean Ruano MD;  Location: [facility] OR [address] [place] [address];  Service: General;;    EXCISION TURBINATE, SUBMUCOUS      FLEXIBLE SIGMOIDOSCOPY N/A 5/13/2019    Procedure: SIGMOIDOSCOPY, FLEXIBLE;  Surgeon: ALBERTO Amin MD;  [facility]  Service: Endoscopy;  Laterality: N/A;    FLEXIBLE SIGMOIDOSCOPY N/A 5/21/2019    Procedure: SIGMOIDOSCOPY, FLEXIBLE;  Surgeon: ALBERTO Amin MD;  Location: [facility]  Service: Endoscopy;  Laterality: N/A;    FUSION OF LUMBAR SPINE BY ANTERIOR APPROACH Left 4/12/2019    Procedure: FUSION, SPINE, LUMBAR, ANTERIOR APPROACH Left L5-S1 Anterior to Psoa Interbody Fusion, L5-S1 Posterior Instrumentation;  Surgeon: Mk George MD;  Location: [address];  Service: Neurosurgery;  Laterality: Left;  Porcedure:  Left L5-S1 Anterior to Psoa Interbody Fusion, L5-S1 Posterior Instrumentation  Surgery Time: 4 Hrs  LOS: 2-3  Anesthesia: General   Blood: Type & Screen  R    HAND SURGERY Left     HAND SURGERY Right     torn ligament repair/ Dr. Yeboah    HYSTERECTOMY      left foot surgery      left wrist surgery      LYSIS OF ADHESIONS N/A 2/19/2020    Procedure: LYSIS, ADHESIONS;  Surgeon: Robin Boyd MD;  Location: [facility] OR [address] [place] [address];  Service: Urology;  Laterality: N/A;    NASAL SEPTUM SURGERY  5/7/15    PERCUTANEOUS NEPHROSTOMY Left 4/21/2019    Procedure: Creation, Nephrostomy, Percutaneous;  Surgeon: Karina Surgeon;  Location: [facility];  Service: Anesthesiology;  Laterality:  Left;    REPAIR OF URETER  4/12/2019    Procedure: REPAIR, URETER;  Surgeon: Mk George MD;  Location: [address];  Service: Neurosurgery;;    REPLACEMENT OF NEPHROSTOMY TUBE N/A 7/18/2019    Procedure: REPLACEMENT, NEPHROSTOMY TUBE;  Surgeon: [facility] Diagnostic Provider;  [facility];  Service: Anesthesiology;  Laterality: N/A;  188    REPLACEMENT OF NEPHROSTOMY TUBE N/A 7/24/2019    Procedure: REPLACEMENT, NEPHROSTOMY TUBE;  Surgeon: [facility] Diagnostic Provider;  [facility];  Service: Anesthesiology;  Laterality: N/A;  188    REPLACEMENT OF NEPHROSTOMY TUBE N/A 10/7/2019    Procedure: REPLACEMENT, NEPHROSTOMY TUBE;  Surgeon: [facility] Diagnostic Provider;  [facility];  Service: Anesthesiology;  Laterality: N/A;  189    REPLACEMENT OF NEPHROSTOMY TUBE N/A 11/25/2019    Procedure: REPLACEMENT, NEPHROSTOMY TUBE;  Surgeon: [facility] Diagnostic Provider;  Location: [facility] OR [address] [place] [address];  Service: Anesthesiology;  Laterality: N/A;  Room 188/Bessy    REPLACEMENT OF NEPHROSTOMY TUBE Right 2/19/2020    Procedure: REPLACEMENT, NEPHROSTOMY TUBE removal removal;  Surgeon: Robin Boyd MD;  Location: [address];  Service: Urology;  Laterality: Right;    rt elbow surgery      S/P LAD COATED STENT  05/14/2010    6 total     S/P OM1 STENT  08/2003    SINUS SURGERY      F.E.S.S.    TRACHEOSTOMY N/A 5/2/2019    Procedure: CREATION, TRACHEOSTOMY;  Surgeon: Sean Ruano MD;  Location: [address];  Service: General;  Laterality: N/A;    TUBAL LIGATION      URETERAL REIMPLANTION Left 2/19/2020    Procedure: REIMPLANTATION, URETER WITH PSOAS HITCH;  Surgeon: Robin Boyd MD;  Location: [address];  Service: Urology;  Laterality: Left;       Family History   Problem Relation Age of Onset    Diabetes Mother     Heart disease Mother     Diabetes Father     Leukemia Father         leukemia    Heart attack Father     Diabetes Sister     Diabetes Brother      Diabetes Sister     No Known Problems Sister     No Known Problems Brother     No Known Problems Brother     No Known Problems Maternal Grandmother     No Known Problems Maternal Grandfather     No Known Problems Paternal Grandmother     No Known Problems Paternal Grandfather     No Known Problems Son     No Known Problems Son     No Known Problems Maternal Aunt     No Known Problems Maternal Uncle     No Known Problems Paternal Aunt     No Known Problems Paternal Uncle     Colon cancer Neg Hx     Inflammatory bowel disease Neg Hx     Melanoma Neg Hx     Psoriasis Neg Hx     Lupus Neg Hx     Eczema Neg Hx     Acne Neg Hx     Amblyopia Neg Hx     Blindness Neg Hx     Cancer Neg Hx     Cataracts Neg Hx     Glaucoma Neg Hx     Hypertension Neg Hx     Macular degeneration Neg Hx     Retinal detachment Neg Hx     Strabismus Neg Hx     Stroke Neg Hx     Thyroid disease Neg Hx     Heart failure Neg Hx     Hyperlipidemia Neg Hx        Social History     Socioeconomic History    Marital status:      Spouse name: Shamir    Number of children: 2    Years of education: Not on file    Highest education level: Not on file   Occupational History    Occupation: cafeteria     Employer: [employer]     Employer: [employer]     Employer: [employer]   Social Needs    Financial resource strain: Not on file    Food insecurity:     Worry: Not on file     Inability: Not on file    Transportation needs:     Medical: Not on file     Non-medical: Not on file   Tobacco Use    Smoking status: Never Smoker    Smokeless tobacco: Never Used   Substance and Sexual Activity    Alcohol use: No     Alcohol/week: 0.0 standard drinks    Drug use: No    Sexual activity: Yes     Partners: Male     Birth control/protection: Post-menopausal     Comment:    Lifestyle    Physical activity:     Days per week: Not on file     Minutes per session: Not on file     Stress: Not on file   Relationships    Social connections:     Talks on phone: Not on file     Gets together: Not on file     Attends Mu-ism service: Not on file     Active member of club or organization: Not on file     Attends meetings of clubs or organizations: Not on file     Relationship status: Not on file   Other Topics Concern    Are you pregnant or think you may be? No    Breast-feeding No   Social History Narrative    . 2 children.        Allergies:  Milk containing products    Medications:    Current Outpatient Medications:     ACCU-CHEK EDIN PLUS METER Misc, , Disp: , Rfl:     albuterol (PROVENTIL/VENTOLIN HFA) 90 mcg/actuation inhaler, Inhale 2 puffs into the lungs every 6 (six) hours as needed for Wheezing., Disp: 1 g, Rfl: 3    albuterol-ipratropium (DUO-NEB) 2.5 mg-0.5 mg/3 mL nebulizer solution, Inhale 3 mLs into the lungs., Disp: , Rfl:     amLODIPine (NORVASC) 10 MG tablet, TAKE 1 TABLET BY MOUTH EVERY DAY, Disp: 90 tablet, Rfl: 2    aspirin 81 mg Tab, Take 81 mg by mouth. 1 Tablet Oral Every day, Disp: , Rfl:     atorvastatin (LIPITOR) 80 MG tablet, 1 tablet (80 mg total) by Per G Tube route once daily., Disp: 90 tablet, Rfl: 3    blood sugar diagnostic (ACCU-CHEK EDIN PLUS TEST STRP) Strp, TEST BLOOD SUGARS 4 TIMES DAILY, Disp: 150 strip, Rfl: 11    blood-glucose sensor (DEXCOM G6 SENSOR) Leann, 3 each by Misc.(Non-Drug; Combo Route) route continuous prn., Disp: 3 each, Rfl: PRN    blood-glucose transmitter (DEXCOM G6 TRANSMITTER) Leann, 1 each by Misc.(Non-Drug; Combo Route) route continuous prn., Disp: 1 each, Rfl: PRN    cefadroxil (DURICEF) 1 gram tablet, TAKE 1 TABLET (1 G TOTAL) BY MOUTH 2 (TWO) TIMES DAILY., Disp: , Rfl: 3    clopidogrel (PLAVIX) 75 mg tablet, Take 1 tablet (75 mg total) by mouth once daily., Disp: 90 tablet, Rfl: 3    doxycycline (VIBRAMYCIN) 100 MG Cap, Take 1 capsule (100 mg total) by mouth 2 (two) times daily. for 14 doses, Disp: 14 capsule,  Rfl: 0    escitalopram oxalate (LEXAPRO) 10 MG tablet, Take 1 tablet (10 mg total) by mouth once daily., Disp: 30 tablet, Rfl: 11    famotidine (PEPCID) 20 MG tablet, Take 1 tablet (20 mg total) by mouth 2 (two) times daily., Disp: 60 tablet, Rfl: 11    furosemide (LASIX) 40 MG tablet, Take 1 tablet (40 mg total) by mouth once daily., Disp: 30 tablet, Rfl: 11    gabapentin (NEURONTIN) 300 MG capsule, Take 1 capsule (300 mg total) by mouth 2 (two) times daily., Disp: 60 capsule, Rfl: 11    hyoscyamine (LEVSIN/SL) 0.125 mg Subl, Place 1 tablet (0.125 mg total) under the tongue every 4 (four) hours as needed., Disp: 30 tablet, Rfl: 1    insulin aspart U-100 (NOVOLOG FLEXPEN U-100 INSULIN) 100 unit/mL (3 mL) InPn pen, Inject 6 Units into the skin 3 (three) times daily with meals., Disp: 6 mL, Rfl: 5    insulin glargine U-300 conc (TOUJEO MAX U-300 SOLOSTAR) 300 unit/mL (3 mL) InPn, Inject 18 Units into the skin every evening., Disp: 6 mL, Rfl: 5    losartan (COZAAR) 100 MG tablet, Take 1 tablet (100 mg total) by mouth once daily., Disp: 90 tablet, Rfl: 3    metoprolol tartrate (LOPRESSOR) 25 MG tablet, Take 1 tablet (25 mg total) by mouth 2 (two) times daily., Disp: 60 tablet, Rfl: 11    multivitamin (THERAGRAN) per tablet, Take 1 tablet by mouth once daily., Disp: , Rfl:     nitrofurantoin (MACRODANTIN) 100 MG capsule, Take 1 capsule (100 mg total) by mouth nightly., Disp: 30 capsule, Rfl: 0    nitrofurantoin, macrocrystal-monohydrate, (MACROBID) 100 MG capsule, Take 1 capsule (100 mg total) by mouth 2 (two) times daily. for 7 days, Disp: 14 capsule, Rfl: 0    nitroGLYCERIN (NITROSTAT) 0.4 MG SL tablet, Take one every 2-3 min till chestpain relief for 3 times and if still no relief, call MD or come to ED, Disp: 30 tablet, Rfl: 11    ondansetron (ZOFRAN-ODT) 8 MG TbDL, Take 1 tablet (8 mg total) by mouth every 12 (twelve) hours as needed., Disp: 30 tablet, Rfl: 2    oxybutynin (DITROPAN-XL) 5 MG TR24,  "Take 2 tablets (10 mg total) by mouth once daily., Disp: 60 tablet, Rfl: 0    oxyCODONE-acetaminophen (PERCOCET) 5-325 mg per tablet, Take 1 tablet by mouth every 6 (six) hours as needed for Pain., Disp: 11 tablet, Rfl: 0    pen needle, diabetic (NOVOTWIST) 32 gauge x 1/5" Ndle, Use 1 needle to inject insulin four times daily, Disp: 400 each, Rfl: 3    polyethylene glycol (GLYCOLAX) 17 gram PwPk, Mix 1 capful (17 g) in liquid and take by mouth once daily., Disp: 14 each, Rfl: 0    potassium chloride SA (K-DUR,KLOR-CON) 20 MEQ tablet, Take 1 tablet (20 mEq total) by mouth 2 (two) times daily., Disp: 60 tablet, Rfl: 11    zolpidem (AMBIEN) 5 MG Tab, Take 1 tablet (5 mg total) by mouth nightly as needed., Disp: 30 tablet, Rfl: 2  No current facility-administered medications for this visit.     Facility-Administered Medications Ordered in Other Visits:     lidocaine (PF) 10 mg/ml (1%) injection 10 mg, 1 mL, Intradermal, Once, Dave Bentley Jr., MD    PHYSICAL EXAMINATION:    Constitutional: She appears well-developed and well-nourished.  She is in no apparent distress.    Eyes: No scleral icterus noted bilaterally. No discharge bilaterally.    Nose: No rhinorrhea    Cardiovascular: Normal rate.  No pitting edema noted in lower extremities bilaterally    Pulmonary/Chest: Effort normal. No respiratory distress.     Abdominal:  She exhibits no distension.      Neurological: She is alert and oriented to person, place, and time.     Skin: Skin is warm and dry.     Psych: Cooperative with normal affect.    Fontenot cathter in place.     Physical Exam      Radiology  Cystogram today  The bladder filled readily with 250 ml contrast, noting a small tubular outpouching along the left superior aspect of the bladder along the distal ureteral stent.  Findings thought to reflect postsurgical appearance in this patient status post bladder flap and left ureteral reimplant.  No extravasation of contrast to suggest a leak.  There " was no evidence of vesicoureteral reflux on the right.  Subsequently the bladder was drained with minimal residual contrast noted      IMPRESSION:  Urinary tract infection associated with indwelling urethral catheter, initial encounter  -     doxycycline (VIBRAMYCIN) 100 MG Cap; Take 1 capsule (100 mg total) by mouth 2 (two) times daily. for 14 doses  Dispense: 14 capsule; Refill: 0  -     nitrofurantoin (MACRODANTIN) 100 MG capsule; Take 1 capsule (100 mg total) by mouth nightly.  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  Doxycycline for 1 wk as prophylactic abx.  Then continue suppressive abx with macrodantin daily.  Will plan cysto left ureteral stent removal in 1 month with CMP and hemoglobin A1c.    Follow up in about 4 weeks (around 4/9/2020), or cysot left ureterl stent removal.

## 2020-04-15 ENCOUNTER — LAB VISIT (OUTPATIENT)
Dept: LAB | Facility: HOSPITAL | Age: 60
End: 2020-04-15
Attending: UROLOGY
Payer: COMMERCIAL

## 2020-04-15 DIAGNOSIS — Z98.890 S/P URETERAL REIMPLANTATION: ICD-10-CM

## 2020-04-15 LAB
ALBUMIN SERPL BCP-MCNC: 3.6 G/DL (ref 3.5–5.2)
ALP SERPL-CCNC: 123 U/L (ref 55–135)
ALT SERPL W/O P-5'-P-CCNC: 20 U/L (ref 10–44)
ANION GAP SERPL CALC-SCNC: 11 MMOL/L (ref 8–16)
AST SERPL-CCNC: 16 U/L (ref 10–40)
BILIRUB SERPL-MCNC: 0.7 MG/DL (ref 0.1–1)
BUN SERPL-MCNC: 18 MG/DL (ref 6–20)
CALCIUM SERPL-MCNC: 9.6 MG/DL (ref 8.7–10.5)
CHLORIDE SERPL-SCNC: 104 MMOL/L (ref 95–110)
CO2 SERPL-SCNC: 29 MMOL/L (ref 23–29)
CREAT SERPL-MCNC: 0.9 MG/DL (ref 0.5–1.4)
EST. GFR  (AFRICAN AMERICAN): >60 ML/MIN/1.73 M^2
EST. GFR  (NON AFRICAN AMERICAN): >60 ML/MIN/1.73 M^2
ESTIMATED AVG GLUCOSE: 183 MG/DL (ref 68–131)
GLUCOSE SERPL-MCNC: 129 MG/DL (ref 70–110)
HBA1C MFR BLD HPLC: 8 % (ref 4–5.6)
POTASSIUM SERPL-SCNC: 3.8 MMOL/L (ref 3.5–5.1)
PROT SERPL-MCNC: 7.6 G/DL (ref 6–8.4)
SODIUM SERPL-SCNC: 144 MMOL/L (ref 136–145)

## 2020-04-15 PROCEDURE — 36415 COLL VENOUS BLD VENIPUNCTURE: CPT | Mod: PO

## 2020-04-15 PROCEDURE — 83036 HEMOGLOBIN GLYCOSYLATED A1C: CPT

## 2020-04-15 PROCEDURE — 80053 COMPREHEN METABOLIC PANEL: CPT

## 2020-04-16 ENCOUNTER — PROCEDURE VISIT (OUTPATIENT)
Dept: UROLOGY | Facility: CLINIC | Age: 60
End: 2020-04-16
Attending: UROLOGY
Payer: COMMERCIAL

## 2020-04-16 VITALS
HEIGHT: 64 IN | DIASTOLIC BLOOD PRESSURE: 84 MMHG | WEIGHT: 154.31 LBS | HEART RATE: 71 BPM | RESPIRATION RATE: 17 BRPM | TEMPERATURE: 98 F | SYSTOLIC BLOOD PRESSURE: 194 MMHG | BODY MASS INDEX: 26.34 KG/M2

## 2020-04-16 DIAGNOSIS — Z98.890 S/P URETERAL REIMPLANTATION: ICD-10-CM

## 2020-04-16 DIAGNOSIS — S37.10XS LEFT URETERAL INJURY, SEQUELA: Primary | ICD-10-CM

## 2020-04-16 PROCEDURE — 52310 CYSTOSCOPY AND TREATMENT: CPT | Mod: 58,S$GLB,, | Performed by: UROLOGY

## 2020-04-16 PROCEDURE — 52310 PR CYSTOSCOPY,REMV CALCULUS,SIMPLE: ICD-10-PCS | Mod: 58,S$GLB,, | Performed by: UROLOGY

## 2020-04-16 RX ORDER — LIDOCAINE HYDROCHLORIDE 20 MG/ML
JELLY TOPICAL ONCE
Status: COMPLETED | OUTPATIENT
Start: 2020-04-16 | End: 2020-04-16

## 2020-04-16 RX ORDER — DOXYCYCLINE HYCLATE 100 MG
100 TABLET ORAL ONCE
Status: COMPLETED | OUTPATIENT
Start: 2020-04-16 | End: 2020-04-16

## 2020-04-16 RX ADMIN — LIDOCAINE HYDROCHLORIDE: 20 JELLY TOPICAL at 09:04

## 2020-04-16 RX ADMIN — Medication 100 MG: at 09:04

## 2020-04-16 NOTE — PATIENT INSTRUCTIONS

## 2020-04-16 NOTE — PROCEDURES
Cystoscopy with Stent Removal  Date/Time: 4/16/2020 9:30 AM  Performed by: Robin Boyd MD  Authorized by: Robin Boyd MD   Comments: Date of Procedure: 04/16/2020    Procedure:                                                                        Cystourethroscopy with Successful Removal of left Ureteral Stent(s)                                                                                    Pre-OP Diagnosis:                                                                Left ureteral stent                                 Post-OP Diagnosis:                                                                same                              Anesthesia:                                                                       Anesthesia Administered:                                                     Intraurethral instillation of 10 mL 2% lidocaine (Xylocaine) jelly.     Findings:                                                                         Double J stent in the ureteral orifice.                                - The stent is not encrusted.                                           Description of Procedure:                                                         Informed Consent:                                                            - Risks, benefits and alternatives of procedure discussed with               patient and informed consent obtained.                                       Patient Position:                                                            - Supine. Careful padding and pressure point care performed.                 Prep and Drape:                                                              - Patient prepped and draped in usual sterile fashion using povidone         iodine (Betadine).                                                           Instruments:                                                                 - Alligator forceps.                                                          - Flexible Cystoscope: With 0 degree lens 16 Fr.                             Procedure Details:                                                           - Cystoscope passed under vision into bladder.                               - Bladder and urethra examined in their entirety with findings as            above.                                                                       - Ureteral stent visualized in bladder, grasped and removed.            Complications:                                                                    No immediate complications.                                             Post-OP Plan:                                                                    2 months with BMP and renal US     Left ureteral injury, sequela  Basic metabolic panel; Future; Expected date: 04/16/2020  US Retroperitoneal Complete (Kidney and; Future; Expected date: 04/16/2020     S/P ureteral reimplantation  Cystoscopy with Stent Removal  lidocaine HCl 2% urojet  Basic metabolic panel; Future; Expected date: 04/16/2020  US Retroperitoneal Complete (Kidney and; Future; Expected date: 04/16/2020

## 2020-05-13 ENCOUNTER — PATIENT OUTREACH (OUTPATIENT)
Dept: ADMINISTRATIVE | Facility: OTHER | Age: 60
End: 2020-05-13

## 2020-05-14 ENCOUNTER — OFFICE VISIT (OUTPATIENT)
Dept: OPHTHALMOLOGY | Facility: CLINIC | Age: 60
End: 2020-05-14
Attending: OPHTHALMOLOGY
Payer: COMMERCIAL

## 2020-05-14 VITALS — SYSTOLIC BLOOD PRESSURE: 158 MMHG | HEART RATE: 80 BPM | DIASTOLIC BLOOD PRESSURE: 74 MMHG

## 2020-05-14 DIAGNOSIS — H35.033 HYPERTENSIVE RETINOPATHY, BILATERAL: ICD-10-CM

## 2020-05-14 PROCEDURE — 92134 POSTERIOR SEGMENT OCT RETINA (OCULAR COHERENCE TOMOGRAPHY)-BOTH EYES: ICD-10-PCS | Mod: S$GLB,,, | Performed by: OPHTHALMOLOGY

## 2020-05-14 PROCEDURE — 92134 CPTRZ OPH DX IMG PST SGM RTA: CPT | Mod: S$GLB,,, | Performed by: OPHTHALMOLOGY

## 2020-05-14 PROCEDURE — 92014 PR EYE EXAM, EST PATIENT,COMPREHESV: ICD-10-PCS | Mod: S$GLB,,, | Performed by: OPHTHALMOLOGY

## 2020-05-14 PROCEDURE — 92014 COMPRE OPH EXAM EST PT 1/>: CPT | Mod: S$GLB,,, | Performed by: OPHTHALMOLOGY

## 2020-05-14 PROCEDURE — 92202 PR OPHTHALMOSCOPY, EXT, W/DRAW OPTIC NERVE/MACULA, I&R, UNI/BI: ICD-10-PCS | Mod: S$GLB,,, | Performed by: OPHTHALMOLOGY

## 2020-05-14 PROCEDURE — 99999 PR PBB SHADOW E&M-EST. PATIENT-LVL III: ICD-10-PCS | Mod: PBBFAC,,, | Performed by: OPHTHALMOLOGY

## 2020-05-14 PROCEDURE — 99999 PR PBB SHADOW E&M-EST. PATIENT-LVL III: CPT | Mod: PBBFAC,,, | Performed by: OPHTHALMOLOGY

## 2020-05-14 PROCEDURE — 92202 OPSCPY EXTND ON/MAC DRAW: CPT | Mod: S$GLB,,, | Performed by: OPHTHALMOLOGY

## 2020-05-14 RX ORDER — FLUORESCEIN 500 MG/ML
5 INJECTION INTRAVENOUS ONCE
Status: DISCONTINUED | OUTPATIENT
Start: 2020-05-14 | End: 2020-09-14

## 2020-05-14 NOTE — PROGRESS NOTES
HPI     3 mo OCT / FA OD  [facility]- 01/09/20 Dr. Doshi    Pt sts vision stable no change denies pain     (-)Flashes (-)Floaters  (+)Photophobia  (+)Glare    No gtts     Grandfather & Aunt - Blind reason unknown     Cataract sx Dr. Domingo 2015 OU     Last edited by Liliane Westfall on 5/14/2020  9:36 AM. (History)           OCT - non central ME OU      A/P    1. Severe NPDR OU  T2 uncontrolled on insulin    2. DME OU  Non central  Monitor    3. HTN Ret OU  BS/BP/chol control    4. PCIOL OU    5. CAD  On PLavix        3 months OCT/FA OD

## 2020-05-22 NOTE — CONSULTS
[facility]  Physical Medicine & Rehab  Consult Note    Patient Name: Mariann Huff  MRN: [id number]  Admission Date: 4/20/2019  Hospital Length of Stay: 41 days  Attending Physician: Robin Boyd MD     Inpatient consult to Physical Medicine & Rehabilitation  Consult performed by: Liliane Hoffman NP  Consult requested by:  Robin Boyd MD    Reason for Consult:  assess rehabilitation needs    Consults  Subjective:     Principal Problem: Ureteral transection of left native kidney     HPI: Mariann Huff is a 59-year-old female with PMHx of HTN, DM2, CAD s/p stenting 2014, MURTAZA, and chronic lower back pain s/p left L5-S1 anterior to psoa interbody fusion.  Patient recently underwent a L5-S1 fusion 4/12/19 with Dr George in [facility] c/b L ureter transection, repaired with end to end ureteroureterostomy stent placement by Dr Boyd in urology. Tentative plan was for stent removal in June.   Patient presented to ED on 4/20/19 with the confusion and fevers. She was hypotensive in ED with UA revealing a UTI. Imaging showed air in the L5-S1 post-operative bed and air in the L ureter, possibly communication between post-op bed and ureter.  Urology consulted. S/p washout and ligation of left ureter with neph tube placement on 4/21. Hospital course further complicated by acute resp failure (s/p extubation now with trach collar placed on 5/2, Episode of negative pressure pulmonary edema on 5/8 requiring mechanical ventilation,diuresis and low dose pressors), dysphagia (s/p PEG 5/2), alternating hypotension with HTN, candiduria and pseudomonas infections (ID following,  rifampin and Ancef until 6/18/19, completed 7 day course of diflucan 800 mg), anemia (s/p transfusion 5/24), intermittent hematochezia ( C-scope by CRS 5/24 - no active bleed, healing rectal ulcer), DVT BUE and RLE (heparin held 2/2 recent GI bleed), dermatitis to buttocks, & failed voiding trial on 5/28 (per urology, maintain nephr tube and correa),  & pain (Dilaudid IVP this am).     Functional History: Patient lives in [place] with  in a single story home with no steps to enter.  Prior to admission, (I) with ADLs and mobility prior to back surgery .  After surgery ambulating with RW. Pt has an LSO brace since back sx to use when OOB.   DME: RW.     Hospital Course: 19: Bed mobility ModA.  Sit to stand ModA & RW x 4 trials.  Ambulated  4 sidesteps x 3 trials then 2 steps fwd/bwd ModA & RW.    2019: Bed mobility Min-ModA.  Sit to stand Inna and transfers MaxA.  Ambulated 12 ft x 3 trials ModA & RW.  UBD and LBD MaxA.    Past Medical History:   Diagnosis Date    Anticoagulant long-term use     Asthma     Back pain     Bradycardia, unspecified 2019    The etiology of the bradycardic episode is unclear.  The have appear to be respiratory in origin (at least the 1st episode).  We will continue to monitor carefully.  We are awaiting evaluation by Cardiology.      CAD (coronary artery disease)     s/p stentimg  (2), (1)    Carotid artery stenosis     Diabetes mellitus type 2 in obese     HTN (hypertension), benign     Hyperlipidemia     Keloid cicatrix     NPDR (nonproliferative diabetic retinopathy) 2015    NSTEMI (non-ST elevated myocardial infarction)     Nuclear sclerosis - Right Eye 3/18/2014    Primary localized osteoarthrosis, lower leg 2014    Sleep apnea      Past Surgical History:   Procedure Laterality Date    BRONCHOSCOPY N/A 2019    Performed by Sean Ruano MD at [facility] OR 2ND FLR    CARDIAC CATHETERIZATION      cataract extraction left eye      cataracts      CATHETERIZATION, HEART, LEFT Left 2014    Performed by Wilman Kim MD at [facility] CATH LAB     SECTION, LOW TRANSVERSE      COLONOSCOPY N/A 2019    Performed by Al Alaniz MD at [facility] ENDO (2ND FLR)    CORONARY ANGIOPLASTY      Creation, Nephrostomy, Percutaneous Left 2019    Performed by  Karina Surgeon at [facility] KARINA    CREATION, TRACHEOSTOMY N/A 5/2/2019    Performed by Sean [facility]ano MD at [facility] OR 2ND FLR    EGD, WITH PEG TUBE INSERTION  5/2/2019    Performed by Sean Runao MD at [facility] OR 2ND FLR    ESOPHAGOGASTRODUODENOSCOPY (EGD) N/A 8/12/2016    Performed by Gardenia Adamson MD at [facility] ENDO (4TH FLR)    EXCISION TURBINATE, SUBMUCOUS      FUSION, SPINE, LUMBAR, ANTERIOR APPROACH Left L5-S1 Anterior to Psoa Interbody Fusion, L5-S1 Posterior Instrumentation Left 4/12/2019    Performed by Mk George MD at [facility] OR 2ND FLR    HAND SURGERY Left     HAND SURGERY Right     torn ligament repair/ Dr. Yeboah    HYSTERECTOMY      Injection,steroid,epidural,transforaminal approach - Bilateral - S1 Bilateral 9/25/2018    Performed by Tl Abreu MD at [facility] PAIN MGT    Injection-steroid-epidural-caudal N/A 2/7/2019    Performed by Dave Bentley Jr., MD at [facility] PAIN MGT    INSERTION-INTRAOCULAR LENS (IOL) Right 9/1/2015    Performed by Good Domingo MD at [facility] OR 1ST FLR    LAPAROTOMY, EXPLORATORY; LIGATION OF LEFT URETER; DOUBLE J STENT REMOVAL LEFT URETER Left 4/20/2019    Performed by Paul Carlson MD at [facility] OR 2ND FLR    left foot surgery      left wrist surgery      NASAL SEPTUM SURGERY  5/7/15    PHACOEMULSIFICATION-ASPIRATION-CATARACT Right 9/1/2015    Performed by Good Domingo MD at [facility] OR 1ST FLR    REPAIR, URETER  4/12/2019    Performed by Mk George MD at [facility] OR 2ND FLR    RESECTION-TURBINATES (SMR) N/A 5/7/2015    Performed by Dileep Dubois III, MD at [facility] OR 2ND FLR    rt elbow surgery      S/P LAD COATED STENT  05/14/2010    6 total     S/P OM1 STENT  08/2003    SEPTOPLASTY N/A 5/7/2015    Performed by Dileep Dubois III, MD at [facility] OR 2ND FLR    SIGMOIDOSCOPY, FLEXIBLE N/A 5/21/2019    Performed by ALBERTO Amin MD at [facility] ENDO (2ND FLR)    SIGMOIDOSCOPY, FLEXIBLE N/A 5/13/2019    Performed by ALBERTO Amin,  MD at [facility] ENDO (2ND FLR)    SINUS SURGERY      F.E.S.S.    SINUS SURGERY FUNCTIONAL ENDOSCOPIC WITH NAVIGATION WITH MAXILLARIES, ETHMOIDS, LEFT SPHENOID, LEFT LOLY N/A 5/7/2015    Performed by Dileep Dubois III, MD at [facility] OR 2ND FLR    STENT, URETERAL placement  4/12/2019    Performed by Robin Boyd MD at [facility] OR 2ND FLR    TUBAL LIGATION       Review of patient's allergies indicates:  No Known Allergies    Scheduled Medications:    amLODIPine  10 mg Per G Tube Daily    ceFAZolin (ANCEF) IVPB  2 g Intravenous Q8H    chlorhexidine  15 mL Mouth/Throat BID    miconazole nitrate 2%   Topical (Top) BID    pantoprazole  40 mg Per G Tube Daily    rifAMpin (RIFADIN) IVPB  300 mg Intravenous Q12H    scopolamine  1 patch Transdermal Q3 Days       PRN Medications: sodium chloride, acetaminophen, albuterol-ipratropium, dextrose 50%, glucagon (human recombinant), hydrALAZINE, HYDROmorphone, hydrOXYzine, insulin aspart U-100, labetalol, magnesium sulfate IVPB, magnesium sulfate IVPB, ondansetron, oxyCODONE, oxyCODONE, promethazine (PHENERGAN) IVPB, sodium chloride 0.9%    Family History     Problem Relation (Age of Onset)    Diabetes Mother, Father, Sister, Brother, Sister    Heart attack Father    Heart disease Mother    Leukemia Father    No Known Problems Sister, Brother, Brother, Maternal Grandmother, Maternal Grandfather, Paternal Grandmother, Paternal Grandfather, Son, Son, Maternal Aunt, Maternal Uncle, Paternal Aunt, Paternal Uncle        Tobacco Use    Smoking status: Never Smoker    Smokeless tobacco: Never Used   Substance and Sexual Activity    Alcohol use: No     Alcohol/week: 0.0 oz    Drug use: No    Sexual activity: Yes     Partners: Male     Birth control/protection: Post-menopausal     Comment:      Review of Systems   Reason unable to perform ROS: non-verbal 2/2 trach.     Objective:     Vital Signs (Most Recent):  Temp: 98.7 °F (37.1 °C) (05/31/19 0700)  Pulse: 109 (05/31/19  1015)  Resp: (!) 23 (05/31/19 1015)  BP: (!) 161/78 (05/31/19 1000)  SpO2: 98 % (05/31/19 1015)    Vital Signs (24h Range):  Temp:  [98.7 °F (37.1 °C)-99.5 °F (37.5 °C)] 98.7 °F (37.1 °C)  Pulse:  [] 109  Resp:  [16-38] 23  SpO2:  [91 %-100 %] 98 %  BP: (124-195)/(60-93) 161/78     Body mass index is 26.05 kg/m².    Physical Exam   Constitutional: She appears well-developed and well-nourished. No distress.  HENT:   Head: Normocephalic and atraumatic.   Eyes: Right eye exhibits no discharge. Left eye exhibits no discharge.   Neck: Neck supple.   Cardiovascular: Intact distal pulses. Tachycardia noted.  Pulmonary/Chest: Effort normal. No respiratory distress.   On trach collar  Abdominal: Soft. There is no tenderness.   PEG in place  neph tube in place    Musculoskeletal: She exhibits no edema or deformity.   Generalize deconditioning    Neurological: She is alert. No sensory deficit. She exhibits normal muscle tone.   Follows commands   Skin: Skin is warm and dry.   Psychiatric: She has a normal mood and affect. Her behavior is normal.   Vitals reviewed.         Diagnostic Results:   Labs: Reviewed  ECG: Reviewed  X-Ray: Reviewed  CT: Reviewed  MRI: Reviewed    Assessment/Plan:     * Ureteral transection of left native kidney  -Urology consulted for intraabdominal abscess with fevers and AMS  -s/p left ureteral injury on 4/12, taken for washout and ligation of left ureter with neph tube placement on 4/21  -Maintain neph tube and correa as patient failed voiding trial on 5/28/19      Impaired functional mobility and endurance  Recommendations  -  Encourage mobility, OOB in chair at least 3 hours per day, and early ambulation as appropriate  -  PT/OT evaluate and treat  -  Pain management  -  Monitor for and prevent skin breakdown and pressure ulcers  · Early mobility, repositioning/weight shifting every 20-30 minutes when sitting, turn patient every 2 hours, proper mattress/overlay and chair cushioning, pressure  relief/heel protector boots  -  DVT prophylaxis    -  Reviewed discharge options (IP rehab, SNF, HH therapy, and OP therapy)      Acute deep vein thrombosis (DVT) of femoral vein of right lower extremity  -AC held 2/2 recent GI bleed  -DVT in the bilateral upper extremities 5/12  -Partially occlusive thrombosis of the right common femoral and proximal femoral veins and thrombosed left anterior tibial vein 5/29  -s/p IVC filter placed 5/29    Status post insertion of percutaneous endoscopic gastrostomy (PEG) tube  -on 5/2/19    BRBPR (bright red blood per rectum)  -s/p C-scope on 5/24 showed healing rectal ulcer and no active bleed    Sepsis  -ID consulted   -5/13 UCx growing Candida albicans on 5/13; s/p 7 day course of diflucan 800 mg  -5/19 BAL growing Pseudomonas, continue rifampin and Ancef until 6/18/19   - ID recommends reimaging patient should she continue to show signs of infection    Participating with therapy. Will follow progress and discuss with rehab team for post acute care/rehab recommendation.      Thank you for your consult.     Liliane Hoffman NP  Department of Physical Medicine & Rehab  [facility]     30

## 2020-06-01 ENCOUNTER — TELEPHONE (OUTPATIENT)
Dept: ENDOCRINOLOGY | Facility: CLINIC | Age: 60
End: 2020-06-01

## 2020-06-01 NOTE — TELEPHONE ENCOUNTER
----- Message from Noble Ba sent at 6/1/2020 12:27 PM CDT -----  Contact: pt   Pt is calling to see if there is a later time available for the date of her appt. Please zia browne a call back at [phone] .

## 2020-06-02 ENCOUNTER — LAB VISIT (OUTPATIENT)
Dept: LAB | Facility: HOSPITAL | Age: 60
End: 2020-06-02
Attending: UROLOGY
Payer: COMMERCIAL

## 2020-06-02 DIAGNOSIS — S37.10XS LEFT URETERAL INJURY, SEQUELA: ICD-10-CM

## 2020-06-02 DIAGNOSIS — Z98.890 S/P URETERAL REIMPLANTATION: ICD-10-CM

## 2020-06-02 LAB
ANION GAP SERPL CALC-SCNC: 11 MMOL/L (ref 8–16)
BUN SERPL-MCNC: 17 MG/DL (ref 6–20)
CALCIUM SERPL-MCNC: 9.6 MG/DL (ref 8.7–10.5)
CHLORIDE SERPL-SCNC: 102 MMOL/L (ref 95–110)
CO2 SERPL-SCNC: 29 MMOL/L (ref 23–29)
CREAT SERPL-MCNC: 1.2 MG/DL (ref 0.5–1.4)
EST. GFR  (AFRICAN AMERICAN): 57.2 ML/MIN/1.73 M^2
EST. GFR  (NON AFRICAN AMERICAN): 49.6 ML/MIN/1.73 M^2
GLUCOSE SERPL-MCNC: 252 MG/DL (ref 70–110)
POTASSIUM SERPL-SCNC: 3.7 MMOL/L (ref 3.5–5.1)
SODIUM SERPL-SCNC: 142 MMOL/L (ref 136–145)

## 2020-06-02 PROCEDURE — 80048 BASIC METABOLIC PNL TOTAL CA: CPT

## 2020-06-02 PROCEDURE — 36415 COLL VENOUS BLD VENIPUNCTURE: CPT | Mod: PO

## 2020-06-03 ENCOUNTER — PATIENT OUTREACH (OUTPATIENT)
Dept: ADMINISTRATIVE | Facility: OTHER | Age: 60
End: 2020-06-03

## 2020-06-04 ENCOUNTER — OFFICE VISIT (OUTPATIENT)
Dept: ENDOCRINOLOGY | Facility: CLINIC | Age: 60
End: 2020-06-04
Payer: COMMERCIAL

## 2020-06-04 VITALS
RESPIRATION RATE: 16 BRPM | TEMPERATURE: 98 F | BODY MASS INDEX: 26.98 KG/M2 | DIASTOLIC BLOOD PRESSURE: 72 MMHG | HEIGHT: 64 IN | SYSTOLIC BLOOD PRESSURE: 110 MMHG | HEART RATE: 74 BPM | WEIGHT: 158.06 LBS

## 2020-06-04 DIAGNOSIS — N18.2 TYPE 2 DIABETES MELLITUS WITH STAGE 2 CHRONIC KIDNEY DISEASE AND HYPERTENSION: Primary | ICD-10-CM

## 2020-06-04 DIAGNOSIS — E11.22 TYPE 2 DIABETES MELLITUS WITH STAGE 2 CHRONIC KIDNEY DISEASE AND HYPERTENSION: Primary | ICD-10-CM

## 2020-06-04 DIAGNOSIS — E11.69 HYPERLIPIDEMIA ASSOCIATED WITH TYPE 2 DIABETES MELLITUS: Chronic | ICD-10-CM

## 2020-06-04 DIAGNOSIS — I25.10 CORONARY ARTERY DISEASE INVOLVING NATIVE CORONARY ARTERY OF NATIVE HEART WITHOUT ANGINA PECTORIS: Chronic | ICD-10-CM

## 2020-06-04 DIAGNOSIS — E78.5 HYPERLIPIDEMIA ASSOCIATED WITH TYPE 2 DIABETES MELLITUS: Chronic | ICD-10-CM

## 2020-06-04 DIAGNOSIS — E11.42 DIABETIC PERIPHERAL NEUROPATHY ASSOCIATED WITH TYPE 2 DIABETES MELLITUS: ICD-10-CM

## 2020-06-04 DIAGNOSIS — E11.59 HYPERTENSION ASSOCIATED WITH DIABETES: Chronic | ICD-10-CM

## 2020-06-04 DIAGNOSIS — I15.2 HYPERTENSION ASSOCIATED WITH DIABETES: Chronic | ICD-10-CM

## 2020-06-04 DIAGNOSIS — I12.9 TYPE 2 DIABETES MELLITUS WITH STAGE 2 CHRONIC KIDNEY DISEASE AND HYPERTENSION: Primary | ICD-10-CM

## 2020-06-04 PROCEDURE — 99999 PR PBB SHADOW E&M-EST. PATIENT-LVL III: CPT | Mod: PBBFAC,,, | Performed by: INTERNAL MEDICINE

## 2020-06-04 PROCEDURE — 3052F HG A1C>EQUAL 8.0%<EQUAL 9.0%: CPT | Mod: CPTII,S$GLB,, | Performed by: INTERNAL MEDICINE

## 2020-06-04 PROCEDURE — 99214 OFFICE O/P EST MOD 30 MIN: CPT | Mod: S$GLB,,, | Performed by: INTERNAL MEDICINE

## 2020-06-04 PROCEDURE — 99214 PR OFFICE/OUTPT VISIT, EST, LEVL IV, 30-39 MIN: ICD-10-PCS | Mod: S$GLB,,, | Performed by: INTERNAL MEDICINE

## 2020-06-04 PROCEDURE — 99999 PR PBB SHADOW E&M-EST. PATIENT-LVL III: ICD-10-PCS | Mod: PBBFAC,,, | Performed by: INTERNAL MEDICINE

## 2020-06-04 PROCEDURE — 3074F PR MOST RECENT SYSTOLIC BLOOD PRESSURE < 130 MM HG: ICD-10-PCS | Mod: CPTII,S$GLB,, | Performed by: INTERNAL MEDICINE

## 2020-06-04 PROCEDURE — 3008F BODY MASS INDEX DOCD: CPT | Mod: CPTII,S$GLB,, | Performed by: INTERNAL MEDICINE

## 2020-06-04 PROCEDURE — 3052F PR MOST RECENT HEMOGLOBIN A1C LEVEL 8.0 - < 9.0%: ICD-10-PCS | Mod: CPTII,S$GLB,, | Performed by: INTERNAL MEDICINE

## 2020-06-04 PROCEDURE — 3008F PR BODY MASS INDEX (BMI) DOCUMENTED: ICD-10-PCS | Mod: CPTII,S$GLB,, | Performed by: INTERNAL MEDICINE

## 2020-06-04 PROCEDURE — 3074F SYST BP LT 130 MM HG: CPT | Mod: CPTII,S$GLB,, | Performed by: INTERNAL MEDICINE

## 2020-06-04 PROCEDURE — 3078F DIAST BP <80 MM HG: CPT | Mod: CPTII,S$GLB,, | Performed by: INTERNAL MEDICINE

## 2020-06-04 PROCEDURE — 3078F PR MOST RECENT DIASTOLIC BLOOD PRESSURE < 80 MM HG: ICD-10-PCS | Mod: CPTII,S$GLB,, | Performed by: INTERNAL MEDICINE

## 2020-06-04 NOTE — ASSESSMENT & PLAN NOTE
No data for review today however recent A1c elevated.  Professional CGM in February 2020 with excellent control although suspect may have been reading falsely low as discordant from A1c.  She has also gained some weight since that time and this is likely contributing to insulin resistance    Resume Trulicity 0.75 mg weekly  Continue Toujeo 18 units daily  Reduce NovoLog to 4 units with meals.  She is aware to skip dose if she does not eat  She will keep a log and sent to me in about a week for further titration  Reviewed hypoglycemia recognition and treatment    Encouraged attention to diet with goal to avoid further weight gain.  Trulicity should help with this

## 2020-06-04 NOTE — PATIENT INSTRUCTIONS
Start taking Trulicity 0.75 mg once a week  Decrease novolog dose to 4 units with meals when you start Trulicity. Skip dose if not eating    Check sugar 2-3 times a day and keep a log and mail to me in about 1-2 weeks  Call and let me know sooner if any sugars less than 70    Hypoglycemia - Low Blood Sugar    What can you do?  Rule of 15:   1. Test your blood sugar  2. If glucose is between 50-70 mg/dL then ingest 15 grams of fast-acting carbs  3. If glucose is less than 50 mg/dL then ingest 30 grams of fast-acting carbs  4. Ingest 15 grams of fast-acting carbohydrate - such as:   1. 3-4 glucose tablets  2. 4 oz juice  3. ½ can regular soda pop  4. 15 skittles or mini jelly beans   5. Re-check your blood sugar in 15 minutes. If its less than 70mg/dl, repeat steps 2 and 3.  6. If your next meal is more than 1 hour away, eat an additional 15 grams of carbohydrate and 1 ounce of protein (examples include crackers with cheese or one-half of a sandwich with peanut butter). It is important not to eat too much because this can raise your blood sugar above the target level.      After your blood sugar has normalized, think about why you went low. If you notice a pattern of low blood sugars, contact your Diabetes Team. We may need to adjust your medication.

## 2020-06-04 NOTE — PROGRESS NOTES
Mariann Huff is a 60 y.o. female with CAD, CKD with proteinuria presenting for follow-up of T2DM    She underwent L5-S1 OLIF on 4/12 and had intraoperative left ureteral injury with ureteroureteral anastomosis and ureteral stent placement, since then has had complications including sepsis with respiratory failure requiring intubation and tube feedings. She lost a significant amount of weight during her critical care stay and was only requiring prandial insulin with tube feedings. basal resumed at visit 1/2020. Has completed subsequent surgeries and feeling back to normal    +weight gain since last visit. States now back to her presurgery weight    History of Present Illness  T2DM  Diagnosed in 2002  Known complications: denies    Current Diabetes Regimen:  Toujeo 18 units daily  Novolog 6 units before every meal    Prior mediations tried:  - Metformin (did not tolerate)  - Trulicity  - Invokana    Diet/Exercise:  Usually 1-2 meals daily   - Breakfast: Biscuit, sausage, 1 piece of fruit   - Lunch: [place] or small snack   - Dinner: Baked chicken, broccoli, beans   - Snacks: Likes chips, candy, fruit cup  Eating more recently    Recent Hgb A1C:  Lab Results   Component Value Date    HGBA1C 8.0 (H) 04/15/2020       Glucose Monitoring:  Usually 2 times daily- lunch time and dinner  No log or meter today    Hypoglycemic Episodes:  Denies although some dizziness in AM but unsure if low during that time    Screening / DM Complications:    Nephropathy:  ACEi/ARB: Taking  Lab Results   Component Value Date    MICALBCREAT 136.0 (H) 12/29/2017       Last Lipid Panel:  Statin: Taking  Lab Results   Component Value Date    LDLCALC 93.2 02/26/2020       Neuropathy:on gabapentin  Last foot exam : 01/06/2020  Last eye exam : 05/14/2020;  no laser surgery or DR    B12:  No results found for: [id number]    Professional CGM 2/2020:  Average glucose: 124 mg/dL  Above 180 mg/dL: 5%  (Above 250 mg/dL: 0%)  Within  mg/dL:  92%    RECOMMENDATIONS:  Continue current insulin regimen:  Toujeo 18 units daily  Novolog 6 units with meals  Consider resumption of Trulicity or other GLP1 which she tolerated in the past. Reduce novolog to 3 units with meals when resuming and with dose titration can likely discontinue prandial insulin  Consider use of personal CGM          Current Outpatient Medications:     ACCU-CHEK EDIN PLUS METER Misc, , Disp: , Rfl:     albuterol (PROVENTIL/VENTOLIN HFA) 90 mcg/actuation inhaler, Inhale 2 puffs into the lungs every 6 (six) hours as needed for Wheezing., Disp: 1 g, Rfl: 3    albuterol-ipratropium (DUO-NEB) 2.5 mg-0.5 mg/3 mL nebulizer solution, Inhale 3 mLs into the lungs., Disp: , Rfl:     amLODIPine (NORVASC) 10 MG tablet, TAKE 1 TABLET BY MOUTH EVERY DAY, Disp: 90 tablet, Rfl: 2    aspirin 81 mg Tab, Take 81 mg by mouth. 1 Tablet Oral Every day, Disp: , Rfl:     atorvastatin (LIPITOR) 80 MG tablet, 1 tablet (80 mg total) by Per G Tube route once daily., Disp: 90 tablet, Rfl: 3    blood sugar diagnostic (ACCU-CHEK EDIN PLUS TEST STRP) Strp, TEST BLOOD SUGARS 4 TIMES DAILY, Disp: 150 strip, Rfl: 11    blood-glucose sensor (DEXCOM G6 SENSOR) Leann, 3 each by Misc.(Non-Drug; Combo Route) route continuous prn., Disp: 3 each, Rfl: PRN    blood-glucose transmitter (DEXCOM G6 TRANSMITTER) Leann, 1 each by Misc.(Non-Drug; Combo Route) route continuous prn., Disp: 1 each, Rfl: PRN    cefadroxil (DURICEF) 1 gram tablet, TAKE 1 TABLET (1 G TOTAL) BY MOUTH 2 (TWO) TIMES DAILY., Disp: , Rfl: 3    clopidogrel (PLAVIX) 75 mg tablet, Take 1 tablet (75 mg total) by mouth once daily., Disp: 90 tablet, Rfl: 3    escitalopram oxalate (LEXAPRO) 10 MG tablet, Take 1 tablet (10 mg total) by mouth once daily., Disp: 30 tablet, Rfl: 11    famotidine (PEPCID) 20 MG tablet, Take 1 tablet (20 mg total) by mouth 2 (two) times daily., Disp: 60 tablet, Rfl: 11    furosemide (LASIX) 40 MG tablet, Take 1 tablet (40 mg total)  "by mouth once daily., Disp: 30 tablet, Rfl: 11    hyoscyamine (LEVSIN/SL) 0.125 mg Subl, Place 1 tablet (0.125 mg total) under the tongue every 4 (four) hours as needed., Disp: 30 tablet, Rfl: 1    insulin aspart U-100 (NOVOLOG FLEXPEN U-100 INSULIN) 100 unit/mL (3 mL) InPn pen, Inject 6 Units into the skin 3 (three) times daily with meals., Disp: 6 mL, Rfl: 5    insulin glargine U-300 conc (TOUJEO MAX U-300 SOLOSTAR) 300 unit/mL (3 mL) InPn, Inject 18 Units into the skin every evening., Disp: 6 mL, Rfl: 5    losartan (COZAAR) 100 MG tablet, Take 1 tablet (100 mg total) by mouth once daily., Disp: 90 tablet, Rfl: 3    metoprolol tartrate (LOPRESSOR) 25 MG tablet, Take 1 tablet (25 mg total) by mouth 2 (two) times daily., Disp: 60 tablet, Rfl: 11    multivitamin (THERAGRAN) per tablet, Take 1 tablet by mouth once daily., Disp: , Rfl:     nitroGLYCERIN (NITROSTAT) 0.4 MG SL tablet, Take one every 2-3 min till chestpain relief for 3 times and if still no relief, call MD or come to ED, Disp: 30 tablet, Rfl: 11    ondansetron (ZOFRAN-ODT) 8 MG TbDL, Take 1 tablet (8 mg total) by mouth every 12 (twelve) hours as needed., Disp: 30 tablet, Rfl: 2    oxyCODONE-acetaminophen (PERCOCET) 5-325 mg per tablet, Take 1 tablet by mouth every 6 (six) hours as needed for Pain., Disp: 11 tablet, Rfl: 0    pen needle, diabetic (NOVOTWIST) 32 gauge x 1/5" Ndle, Use 1 needle to inject insulin four times daily, Disp: 400 each, Rfl: 3    polyethylene glycol (GLYCOLAX) 17 gram PwPk, Mix 1 capful (17 g) in liquid and take by mouth once daily., Disp: 14 each, Rfl: 0    potassium chloride SA (K-DUR,KLOR-CON) 20 MEQ tablet, Take 1 tablet (20 mEq total) by mouth 2 (two) times daily., Disp: 60 tablet, Rfl: 11    dulaglutide (TRULICITY) 0.75 mg/0.5 mL PnIj, Inject 0.5 mLs (0.75 mg total) into the skin every 7 days., Disp: 4 Syringe, Rfl: 3    gabapentin (NEURONTIN) 300 MG capsule, Take 1 capsule (300 mg total) by mouth 2 (two) times " daily. (Patient not taking: Reported on 6/4/2020), Disp: 60 capsule, Rfl: 11    oxybutynin (DITROPAN-XL) 5 MG TR24, Take 2 tablets (10 mg total) by mouth once daily., Disp: 60 tablet, Rfl: 0    zolpidem (AMBIEN) 5 MG Tab, Take 1 tablet (5 mg total) by mouth nightly as needed., Disp: 30 tablet, Rfl: 2    Current Facility-Administered Medications:     fluorescein 500 mg/5 mL (10 %) injection 500 mg, 5 mL, Intravenous, Once, CHARLOTTE Doshi MD    Facility-Administered Medications Ordered in Other Visits:     lidocaine (PF) 10 mg/ml (1%) injection 10 mg, 1 mL, Intradermal, Once, Dave Bentley Jr., MD    Review of Systems   Constitutional: Positive for unexpected weight change. Negative for activity change.   Respiratory: Negative for shortness of breath.    Cardiovascular: Negative for chest pain and leg swelling.   Gastrointestinal: Negative for abdominal pain.   Genitourinary: Negative for dysuria.   Skin: Negative for rash.   Neurological: Negative for headaches.   Psychiatric/Behavioral: Negative for confusion.       Objective:     Vitals:    06/04/20 0946   BP: 110/72   Pulse: 74   Resp: 16   Temp: 98.4 °F (36.9 °C)     Wt Readings from Last 3 Encounters:   06/04/20 71.7 kg (158 lb 1.1 oz)   04/16/20 70 kg (154 lb 5.2 oz)   03/06/20 67.2 kg (148 lb 2.4 oz)     Body mass index is 27.13 kg/m².  Physical Exam   Constitutional: She is oriented to person, place, and time. She appears well-developed and well-nourished.   HENT:   Head: Normocephalic.   Eyes: Conjunctivae and EOM are normal.   Pulmonary/Chest: Effort normal.   Musculoskeletal: She exhibits no edema.   Walks with cane   Neurological: She is alert and oriented to person, place, and time.   Skin: Skin is warm. No rash noted.   Shoes appropriate, no foot lesions    LABS    Chemistry        Component Value Date/Time     06/02/2020 0947    K 3.7 06/02/2020 0947     06/02/2020 0947    CO2 29 06/02/2020 0947    BUN 17 06/02/2020 0947     CREATININE 1.2 06/02/2020 0947     (H) 06/02/2020 0947        Component Value Date/Time    CALCIUM 9.6 06/02/2020 0947    ALKPHOS 123 04/15/2020 0932    AST 16 04/15/2020 0932    ALT 20 04/15/2020 0932    BILITOT 0.7 04/15/2020 0932    ESTGFRAFRICA 57.2 (A) 06/02/2020 0947    EGFRNONAA 49.6 (A) 06/02/2020 0947              Assessment and Plan     Type 2 diabetes mellitus with stage 2 chronic kidney disease and hypertension  No data for review today however recent A1c elevated.  Professional CGM in February 2020 with excellent control although suspect may have been reading falsely low as discordant from A1c.  She has also gained some weight since that time and this is likely contributing to insulin resistance    Resume Trulicity 0.75 mg weekly  Continue Toujeo 18 units daily  Reduce NovoLog to 4 units with meals.  She is aware to skip dose if she does not eat  She will keep a log and sent to me in about a week for further titration  Reviewed hypoglycemia recognition and treatment    Encouraged attention to diet with goal to avoid further weight gain.  Trulicity should help with this    Diabetic peripheral neuropathy associated with type 2 diabetes mellitus  Glycemic control as above  Continue gabapentin    CAD (coronary artery disease)  Resumption Trulicity as above given known cardiovascular benefit  Goal for glycemic control with avoidance of hypoglycemia    Hyperlipidemia associated with type 2 diabetes mellitus  Continue statin    Hypertension associated with diabetes  Blood pressure at goal  Continue current medications        RTC 4 months    Tatiana Randolph MD

## 2020-06-04 NOTE — ASSESSMENT & PLAN NOTE
Resumption Trulicity as above given known cardiovascular benefit  Goal for glycemic control with avoidance of hypoglycemia

## 2020-06-11 ENCOUNTER — OFFICE VISIT (OUTPATIENT)
Dept: INTERNAL MEDICINE | Facility: CLINIC | Age: 60
End: 2020-06-11
Payer: COMMERCIAL

## 2020-06-11 VITALS
SYSTOLIC BLOOD PRESSURE: 120 MMHG | RESPIRATION RATE: 14 BRPM | BODY MASS INDEX: 27.36 KG/M2 | HEART RATE: 80 BPM | HEIGHT: 64 IN | TEMPERATURE: 98 F | WEIGHT: 160.25 LBS | DIASTOLIC BLOOD PRESSURE: 70 MMHG

## 2020-06-11 DIAGNOSIS — E11.51 TYPE 2 DIABETES MELLITUS WITH DIABETIC PERIPHERAL ANGIOPATHY WITHOUT GANGRENE, WITHOUT LONG-TERM CURRENT USE OF INSULIN: Primary | Chronic | ICD-10-CM

## 2020-06-11 DIAGNOSIS — E11.69 HYPERLIPIDEMIA ASSOCIATED WITH TYPE 2 DIABETES MELLITUS: Chronic | ICD-10-CM

## 2020-06-11 DIAGNOSIS — I12.9 TYPE 2 DIABETES MELLITUS WITH STAGE 2 CHRONIC KIDNEY DISEASE AND HYPERTENSION: Chronic | ICD-10-CM

## 2020-06-11 DIAGNOSIS — I15.2 HYPERTENSION ASSOCIATED WITH DIABETES: Chronic | ICD-10-CM

## 2020-06-11 DIAGNOSIS — G47.00 INSOMNIA, UNSPECIFIED TYPE: ICD-10-CM

## 2020-06-11 DIAGNOSIS — E11.22 TYPE 2 DIABETES MELLITUS WITH STAGE 2 CHRONIC KIDNEY DISEASE AND HYPERTENSION: Chronic | ICD-10-CM

## 2020-06-11 DIAGNOSIS — R53.1 WEAKNESS: ICD-10-CM

## 2020-06-11 DIAGNOSIS — E78.5 HYPERLIPIDEMIA ASSOCIATED WITH TYPE 2 DIABETES MELLITUS: Chronic | ICD-10-CM

## 2020-06-11 DIAGNOSIS — H81.10 BENIGN PAROXYSMAL POSITIONAL VERTIGO, UNSPECIFIED LATERALITY: ICD-10-CM

## 2020-06-11 DIAGNOSIS — N18.2 TYPE 2 DIABETES MELLITUS WITH STAGE 2 CHRONIC KIDNEY DISEASE AND HYPERTENSION: Chronic | ICD-10-CM

## 2020-06-11 DIAGNOSIS — E11.59 HYPERTENSION ASSOCIATED WITH DIABETES: Chronic | ICD-10-CM

## 2020-06-11 PROCEDURE — 3078F DIAST BP <80 MM HG: CPT | Mod: CPTII,S$GLB,, | Performed by: INTERNAL MEDICINE

## 2020-06-11 PROCEDURE — 3052F HG A1C>EQUAL 8.0%<EQUAL 9.0%: CPT | Mod: CPTII,S$GLB,, | Performed by: INTERNAL MEDICINE

## 2020-06-11 PROCEDURE — 99214 PR OFFICE/OUTPT VISIT, EST, LEVL IV, 30-39 MIN: ICD-10-PCS | Mod: S$GLB,,, | Performed by: INTERNAL MEDICINE

## 2020-06-11 PROCEDURE — 99214 OFFICE O/P EST MOD 30 MIN: CPT | Mod: S$GLB,,, | Performed by: INTERNAL MEDICINE

## 2020-06-11 PROCEDURE — 3074F SYST BP LT 130 MM HG: CPT | Mod: CPTII,S$GLB,, | Performed by: INTERNAL MEDICINE

## 2020-06-11 PROCEDURE — 3008F BODY MASS INDEX DOCD: CPT | Mod: CPTII,S$GLB,, | Performed by: INTERNAL MEDICINE

## 2020-06-11 PROCEDURE — 3078F PR MOST RECENT DIASTOLIC BLOOD PRESSURE < 80 MM HG: ICD-10-PCS | Mod: CPTII,S$GLB,, | Performed by: INTERNAL MEDICINE

## 2020-06-11 PROCEDURE — 99999 PR PBB SHADOW E&M-EST. PATIENT-LVL III: CPT | Mod: PBBFAC,,, | Performed by: INTERNAL MEDICINE

## 2020-06-11 PROCEDURE — 3052F PR MOST RECENT HEMOGLOBIN A1C LEVEL 8.0 - < 9.0%: ICD-10-PCS | Mod: CPTII,S$GLB,, | Performed by: INTERNAL MEDICINE

## 2020-06-11 PROCEDURE — 3074F PR MOST RECENT SYSTOLIC BLOOD PRESSURE < 130 MM HG: ICD-10-PCS | Mod: CPTII,S$GLB,, | Performed by: INTERNAL MEDICINE

## 2020-06-11 PROCEDURE — 3008F PR BODY MASS INDEX (BMI) DOCUMENTED: ICD-10-PCS | Mod: CPTII,S$GLB,, | Performed by: INTERNAL MEDICINE

## 2020-06-11 PROCEDURE — 99999 PR PBB SHADOW E&M-EST. PATIENT-LVL III: ICD-10-PCS | Mod: PBBFAC,,, | Performed by: INTERNAL MEDICINE

## 2020-06-11 RX ORDER — ESZOPICLONE 2 MG/1
2 TABLET, FILM COATED ORAL NIGHTLY
Qty: 30 TABLET | Refills: 0 | Status: SHIPPED | OUTPATIENT
Start: 2020-06-11 | End: 2020-08-05

## 2020-06-11 NOTE — PROGRESS NOTES
Subjective:       Patient ID: Mariann Huff is a 60 y.o. female.    Chief Complaint: Follow-up    HPI     60-year-old female here for 3 month follow-up.    Diabetes - Her BG run 151, 121.  Am, her BG run 98 after she eats.  She is on novolog 6 units TID, tuojeo 16 units, trulicity 0.75 mg weekly.  She sees endocrine for this.  Lab Results   Component Value Date    HGBA1C 8.0 (H) 04/15/2020    HGBA1C 7.5 (H) 02/26/2020    HGBA1C 9.0 (H) 02/12/2020     Lab Results   Component Value Date    GLUF 217 (H) 09/15/2014    LDLCALC 93.2 02/26/2020    CREATININE 1.2 06/02/2020       HTN -  Patient's co morbidities include: DM. Patient is currently on Norvasc 10 mg, losartan 100 mg, Lopressor 25 mg b.i.d.. She does check her BP at home, and it runs 110s-140s/60s-70s. Side effects of medications note: none. Denies headaches, blurred vision, chest pain, shortness of breath, nausea.    HLD - Patient is currently on lipitor 80 mg.  Her last lipid panel was   Cholesterol   Date Value Ref Range Status   02/26/2020 154 120 - 199 mg/dL Final     Comment:     The National Cholesterol Education Program (NCEP) has set the  following guidelines (reference ranges) for Cholesterol:  Optimal.....................<200 mg/dL  Borderline High.............200-239 mg/dL  High........................> or = 240 mg/dL       Triglycerides   Date Value Ref Range Status   02/26/2020 174 (H) 30 - 150 mg/dL Final     Comment:     The National Cholesterol Education Program (NCEP) has set the  following guidelines (reference values) for triglycerides:  Normal......................<150 mg/dL  Borderline High.............150-199 mg/dL  High........................200-499 mg/dL       HDL   Date Value Ref Range Status   02/26/2020 26 (L) 40 - 75 mg/dL Final     Comment:     The National Cholesterol Education Program (NCEP) has set the  following guidelines (reference values) for HDL Cholesterol:  Low...............<40 mg/dL  Optimal...........>60 mg/dL        LDL Cholesterol   Date Value Ref Range Status   02/26/2020 93.2 63.0 - 159.0 mg/dL Final     Comment:     The National Cholesterol Education Program (NCEP) has set the  following guidelines (reference values) for LDL Cholesterol:  Optimal.......................<130 mg/dL  Borderline High...............130-159 mg/dL  High..........................160-189 mg/dL  Very High.....................>190 mg/dL     .  Side effects of the medication: none.    She cannot sleep at night.  She watches TV when she cannot sleep at night.  She has tried ambien for sleep without relief.  She is not able to fall asleep.    She gets dizzy when she is lying flat on her back and turns over.      She still has some weakness in her legs.  She has not touched base with her surgeon as of yet about this.  She did complete PT.    Review of Systems    Objective:      Physical Exam   Constitutional: She is oriented to person, place, and time. She appears well-developed and well-nourished.   HENT:   Head: Normocephalic and atraumatic.   Mouth/Throat: No oropharyngeal exudate.   Eyes: Pupils are equal, round, and reactive to light. EOM are normal. Right eye exhibits no discharge. Left eye exhibits no discharge. No scleral icterus.   Neck: Normal range of motion. Neck supple. No tracheal deviation present. No thyromegaly present.   Cardiovascular: Normal rate, regular rhythm and normal heart sounds. Exam reveals no gallop and no friction rub.   No murmur heard.  Pulmonary/Chest: Effort normal and breath sounds normal. No respiratory distress. She has no wheezes. She has no rales. She exhibits no tenderness.   Abdominal: Soft. Bowel sounds are normal. She exhibits no distension and no mass. There is no tenderness. There is no rebound and no guarding.   Musculoskeletal: Normal range of motion. She exhibits no edema or tenderness.   Neurological: She is alert and oriented to person, place, and time.   Skin: Skin is warm and dry. No rash noted. No  erythema. No pallor.   Psychiatric: She has a normal mood and affect. Her behavior is normal.   Vitals reviewed.      Assessment:       1. Type 2 diabetes mellitus with diabetic peripheral angiopathy without gangrene, without long-term current use of insulin    2. Type 2 diabetes mellitus with stage 2 chronic kidney disease and hypertension    3. Hypertension associated with diabetes    4. Hyperlipidemia associated with type 2 diabetes mellitus    5. Benign paroxysmal positional vertigo, unspecified laterality    6. Insomnia, unspecified type    7. Weakness        Plan:       1/2.  Continue novolog 6 units TID, tuojeo 16 units, trulicity 0.75 mg weekly.  Continue to follow with Endocrine.  3.  Continue Norvasc 10 mg, losartan 100 mg, Lopressor 25 mg b.i.d.  4.  Continue Lipitor 80 mg daily.  5.  Counseled patient on mechanism behind the dizziness.  Not currently having an episode.  6.  Try Lunesta 2 mg nightly.  7.  Follow-up with Neurosurgery regarding weakness.

## 2020-06-15 ENCOUNTER — TELEPHONE (OUTPATIENT)
Dept: NEUROSURGERY | Facility: CLINIC | Age: 60
End: 2020-06-15

## 2020-06-15 NOTE — TELEPHONE ENCOUNTER
----- Message from Franklyn Pederson sent at 6/15/2020  3:56 PM CDT -----  Contact: Patient @[phone]  Patient calling to schedule a f/u appt, pls call

## 2020-06-17 ENCOUNTER — PATIENT OUTREACH (OUTPATIENT)
Dept: ADMINISTRATIVE | Facility: OTHER | Age: 60
End: 2020-06-17

## 2020-06-17 ENCOUNTER — TELEPHONE (OUTPATIENT)
Dept: CARDIOLOGY | Facility: CLINIC | Age: 60
End: 2020-06-17

## 2020-06-17 DIAGNOSIS — E78.5 HYPERLIPIDEMIA ASSOCIATED WITH TYPE 2 DIABETES MELLITUS: Primary | ICD-10-CM

## 2020-06-17 DIAGNOSIS — E11.69 HYPERLIPIDEMIA ASSOCIATED WITH TYPE 2 DIABETES MELLITUS: Primary | ICD-10-CM

## 2020-06-17 DIAGNOSIS — E11.59 HYPERTENSION ASSOCIATED WITH DIABETES: ICD-10-CM

## 2020-06-17 DIAGNOSIS — I15.2 HYPERTENSION ASSOCIATED WITH DIABETES: ICD-10-CM

## 2020-06-17 DIAGNOSIS — E11.51 TYPE 2 DIABETES MELLITUS WITH DIABETIC PERIPHERAL ANGIOPATHY WITHOUT GANGRENE, WITHOUT LONG-TERM CURRENT USE OF INSULIN: ICD-10-CM

## 2020-06-18 ENCOUNTER — HOSPITAL ENCOUNTER (OUTPATIENT)
Dept: RADIOLOGY | Facility: HOSPITAL | Age: 60
Discharge: HOME | End: 2020-06-18
Attending: UROLOGY
Payer: COMMERCIAL

## 2020-06-18 ENCOUNTER — OFFICE VISIT (OUTPATIENT)
Dept: UROLOGY | Facility: CLINIC | Age: 60
End: 2020-06-18
Payer: COMMERCIAL

## 2020-06-18 ENCOUNTER — TELEPHONE (OUTPATIENT)
Dept: NEUROSURGERY | Facility: CLINIC | Age: 60
End: 2020-06-18

## 2020-06-18 VITALS
HEART RATE: 96 BPM | SYSTOLIC BLOOD PRESSURE: 152 MMHG | WEIGHT: 156.94 LBS | DIASTOLIC BLOOD PRESSURE: 81 MMHG | BODY MASS INDEX: 26.79 KG/M2 | HEIGHT: 64 IN

## 2020-06-18 DIAGNOSIS — I12.9 TYPE 2 DIABETES MELLITUS WITH STAGE 2 CHRONIC KIDNEY DISEASE AND HYPERTENSION: Chronic | ICD-10-CM

## 2020-06-18 DIAGNOSIS — E11.22 TYPE 2 DIABETES MELLITUS WITH STAGE 2 CHRONIC KIDNEY DISEASE, WITHOUT LONG-TERM CURRENT USE OF INSULIN: ICD-10-CM

## 2020-06-18 DIAGNOSIS — S37.10XD LEFT URETERAL INJURY, SUBSEQUENT ENCOUNTER: Primary | ICD-10-CM

## 2020-06-18 DIAGNOSIS — N18.2 TYPE 2 DIABETES MELLITUS WITH STAGE 2 CHRONIC KIDNEY DISEASE, WITHOUT LONG-TERM CURRENT USE OF INSULIN: ICD-10-CM

## 2020-06-18 DIAGNOSIS — M43.27 FUSION OF SPINE, LUMBOSACRAL REGION: ICD-10-CM

## 2020-06-18 DIAGNOSIS — E11.22 TYPE 2 DIABETES MELLITUS WITH STAGE 2 CHRONIC KIDNEY DISEASE AND HYPERTENSION: Chronic | ICD-10-CM

## 2020-06-18 DIAGNOSIS — S37.10XS LEFT URETERAL INJURY, SEQUELA: ICD-10-CM

## 2020-06-18 DIAGNOSIS — N18.2 TYPE 2 DIABETES MELLITUS WITH STAGE 2 CHRONIC KIDNEY DISEASE AND HYPERTENSION: Chronic | ICD-10-CM

## 2020-06-18 DIAGNOSIS — N13.30 HYDRONEPHROSIS, UNSPECIFIED HYDRONEPHROSIS TYPE: ICD-10-CM

## 2020-06-18 DIAGNOSIS — N39.0 RECURRENT UTI: ICD-10-CM

## 2020-06-18 DIAGNOSIS — Z98.890 S/P URETERAL REIMPLANTATION: ICD-10-CM

## 2020-06-18 PROBLEM — Z93.6 NEPHROSTOMY STATUS: Chronic | Status: RESOLVED | Noted: 2019-07-11 | Resolved: 2020-06-18

## 2020-06-18 PROCEDURE — 76770 US RETROPERITONEAL COMPLETE: ICD-10-PCS | Mod: 26,,, | Performed by: RADIOLOGY

## 2020-06-18 PROCEDURE — 99215 PR OFFICE/OUTPT VISIT, EST, LEVL V, 40-54 MIN: ICD-10-PCS | Mod: S$GLB,,, | Performed by: UROLOGY

## 2020-06-18 PROCEDURE — 99999 PR PBB SHADOW E&M-EST. PATIENT-LVL V: ICD-10-PCS | Mod: PBBFAC,,, | Performed by: UROLOGY

## 2020-06-18 PROCEDURE — 3077F PR MOST RECENT SYSTOLIC BLOOD PRESSURE >= 140 MM HG: ICD-10-PCS | Mod: CPTII,S$GLB,, | Performed by: UROLOGY

## 2020-06-18 PROCEDURE — 3079F PR MOST RECENT DIASTOLIC BLOOD PRESSURE 80-89 MM HG: ICD-10-PCS | Mod: CPTII,S$GLB,, | Performed by: UROLOGY

## 2020-06-18 PROCEDURE — 76770 US EXAM ABDO BACK WALL COMP: CPT | Mod: TC

## 2020-06-18 PROCEDURE — 87077 CULTURE AEROBIC IDENTIFY: CPT

## 2020-06-18 PROCEDURE — 3077F SYST BP >= 140 MM HG: CPT | Mod: CPTII,S$GLB,, | Performed by: UROLOGY

## 2020-06-18 PROCEDURE — 3052F PR MOST RECENT HEMOGLOBIN A1C LEVEL 8.0 - < 9.0%: ICD-10-PCS | Mod: CPTII,S$GLB,, | Performed by: UROLOGY

## 2020-06-18 PROCEDURE — 87186 SC STD MICRODIL/AGAR DIL: CPT

## 2020-06-18 PROCEDURE — 3079F DIAST BP 80-89 MM HG: CPT | Mod: CPTII,S$GLB,, | Performed by: UROLOGY

## 2020-06-18 PROCEDURE — 87088 URINE BACTERIA CULTURE: CPT

## 2020-06-18 PROCEDURE — 3008F PR BODY MASS INDEX (BMI) DOCUMENTED: ICD-10-PCS | Mod: CPTII,S$GLB,, | Performed by: UROLOGY

## 2020-06-18 PROCEDURE — 3008F BODY MASS INDEX DOCD: CPT | Mod: CPTII,S$GLB,, | Performed by: UROLOGY

## 2020-06-18 PROCEDURE — 99999 PR PBB SHADOW E&M-EST. PATIENT-LVL V: CPT | Mod: PBBFAC,,, | Performed by: UROLOGY

## 2020-06-18 PROCEDURE — 76770 US EXAM ABDO BACK WALL COMP: CPT | Mod: 26,,, | Performed by: RADIOLOGY

## 2020-06-18 PROCEDURE — 99215 OFFICE O/P EST HI 40 MIN: CPT | Mod: S$GLB,,, | Performed by: UROLOGY

## 2020-06-18 PROCEDURE — 87086 URINE CULTURE/COLONY COUNT: CPT

## 2020-06-18 PROCEDURE — 3052F HG A1C>EQUAL 8.0%<EQUAL 9.0%: CPT | Mod: CPTII,S$GLB,, | Performed by: UROLOGY

## 2020-06-18 RX ORDER — NITROFURANTOIN (MACROCRYSTALS) 100 MG/1
100 CAPSULE ORAL NIGHTLY
Qty: 100 CAPSULE | Refills: 0 | Status: SHIPPED | OUTPATIENT
Start: 2020-06-18 | End: 2020-06-28

## 2020-06-18 NOTE — PROGRESS NOTES
CHIEF COMPLAINT:    Mrs. Huff is a 60 y.o. female presenting following Boari flap with left ureteral reimplant  On 2/29/20. C/o bladder pain    PRESENTING ILLNESS:    Mariann Huff is a 60 y.o. female with s/p Borary flap with left ureteral reimplant on 2/19/20.  C/o bladder pain sometimes.  Otherwise, voices no problem with urination.  She had renal US and blood tests and is here for a follow up.    a hx of left ureteral injury during L5/S1 fusion on 4/12/19. She underwent primary ureteroureterostomy at that time. 1 week later she presented septic with an intra-abdominal abscess secondary to breakdown of the repair. At that time she underwent ex lap with clipping of the ureter and neph tube placement. Her post op course was severely complicated with a prolonged ICU stay. She has recovered from this and underwent repair of her left ureter finally on 2/19/20.  She has had poorly controlled diabetes and hx of malnutrition ( low albumin), which made it difficult to initiate her ureteral injury repair.    She had the following procedure on 02/19/2020  Procedure(s) Performed:   1.  Boari flap with left ureteral reimplant  2.  Left ureteral stent placement  3.  Psoas hitch  4.  Lysis of adhesions   Findings:    1.  Severe adhesions of small bowel and colon in left lower quadrant   2.  Ureter identified above the pelvic brim and tracked distally, at location of injury there was severe fibrosis and inability to dissect the ureter further, transected proximal to this  3.  Bladder identified and mobilized from superficial and peritoneal attachments  4.  Boari flap with psoas hitch performed with tension free anastomosis  5.  Leak test performed and confirmed negative    Drains:   1.  6 Fr x 24 cm left ureteral stent  2.  16 Fr correa catheter  3.  15 mm robert drain     REVIEW OF SYSTEMS:      Constitutional: Negative for fever and chills.   HENT: Negative for hearing loss.   Eyes: Negative for visual disturbance.    Respiratory: Negative for shortness of breath.   Cardiovascular: Negative for chest pain.   Gastrointestinal: Negative for vomiting, and constipation.   Genitourinary:  See HPI  Neurological: Negative for dizziness.   Hematological: Does not bruise/bleed easily.   Psychiatric/Behavioral: Negative for confusion.     PATIENT HISTORY:    Past Medical History:   Diagnosis Date    Anticoagulant long-term use     Asthma     Back pain     Bradycardia, unspecified 5/8/2019    The etiology of the bradycardic episode is unclear.  The have appear to be respiratory in origin (at least the 1st episode).  We will continue to monitor carefully.  We are awaiting evaluation by Cardiology.     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, with long-term current use of insulin 1/9/2020       Past Surgical History:   Procedure Laterality Date    ANTEGRADE NEPHROSTOGRAPHY Left 12/11/2019    Procedure: Nephrostogram - antegrade;  Surgeon: Robin Boyd MD;  Location: [facility] OR [address] [place] [address];  Service: Urology;  Laterality: Left;    BRONCHOSCOPY N/A 5/2/2019    Procedure: BRONCHOSCOPY;  Surgeon: Sean Ruano MD;  Location: [facility] OR [address] [place] [address];  Service: General;  Laterality: N/A;    CARDIAC CATHETERIZATION      CATARACT EXTRACTION      cataract extraction left eye      cataracts      CAUDAL EPIDURAL STEROID INJECTION N/A 2/7/2019    Procedure: Injection-steroid-epidural-caudal;  Surgeon: Dave URBANO  Mc Farah MD;  Location[facility] PAIN MGT;  Service: Pain Management;  Laterality: N/A;     SECTION, LOW TRANSVERSE      COLONOSCOPY N/A 2019    Procedure: COLONOSCOPY;  Surgeon: Al Alaniz MD;  Location: [facility] ENDO (2ND FLR);  Service: Endoscopy;  Laterality: N/A;    CORONARY ANGIOPLASTY      CYSTOGRAM N/A 2019    Procedure: CYSTOGRAM INTRAOP;  Surgeon: Robin Boyd MD;  [facility];  Service: Urology;  Laterality: N/A;  1 HOUR    CYSTOSCOPY W/ RETROGRADES Left 2019    Procedure: CYSTOSCOPY, WITH RETROGRADE PYELOGRAM;  Surgeon: Robin Boyd MD;  [facility];  Service: Urology;  Laterality: Left;  fluro    ESOPHAGOGASTRODUODENOSCOPY W/ PEG  2019    Procedure: EGD, WITH PEG TUBE INSERTION;  Surgeon: Sean Ruano MD;  [facility];  Service: General;;    EXCISION TURBINATE, SUBMUCOUS      FLEXIBLE SIGMOIDOSCOPY N/A 2019    Procedure: SIGMOIDOSCOPY, FLEXIBLE;  Surgeon: ALBERTO Amin MD;  Location: [facility] ENDO (2ND FLR);  Service: Endoscopy;  Laterality: N/A;    FLEXIBLE SIGMOIDOSCOPY N/A 2019    Procedure: SIGMOIDOSCOPY, FLEXIBLE;  Surgeon: ALBERTO Amin MD;  Location[facility]);  Service: Endoscopy;  Laterality: N/A;    FUSION OF LUMBAR SPINE BY ANTERIOR APPROACH Left 2019    Procedure: FUSION, SPINE, LUMBAR, ANTERIOR APPROACH Left L5-S1 Anterior to Psoa Interbody Fusion, L5-S1 Posterior Instrumentation;  Surgeon: Mk George MD;  [facility];  Service: Neurosurgery;  Laterality: Left;  Porcedure:  Left L5-S1 Anterior to Psoa Interbody Fusion, L5-S1 Posterior Instrumentation  Surgery Time: 4 Hrs  LOS: 2-3  Anesthesia: General   Blood: Type & Screen  R    HAND SURGERY Left     HAND SURGERY Right     torn ligament repair/ Dr. Yeboah    HYSTERECTOMY      left foot surgery      left wrist surgery      LYSIS OF ADHESIONS N/A 2020    Procedure: LYSIS, ADHESIONS;   Surgeon: Robin Boyd MD;  Location: [address];  Service: Urology;  Laterality: N/A;    NASAL SEPTUM SURGERY  5/7/15    PERCUTANEOUS NEPHROSTOMY Left 4/21/2019    Procedure: Creation, Nephrostomy, Percutaneous;  Surgeon: Karina Surgeon;  Location: [facility];  Service: Anesthesiology;  Laterality: Left;    REPAIR OF URETER  4/12/2019    Procedure: REPAIR, URETER;  Surgeon: Mk George MD;  Location: [address];  Service: Neurosurgery;;    REPLACEMENT OF NEPHROSTOMY TUBE N/A 7/18/2019    Procedure: REPLACEMENT, NEPHROSTOMY TUBE;  Surgeon: [facility] Diagnostic Provider;  [address];  Service: Anesthesiology;  Laterality: N/A;  188    REPLACEMENT OF NEPHROSTOMY TUBE N/A 7/24/2019    Procedure: REPLACEMENT, NEPHROSTOMY TUBE;  Surgeon: [facility] Diagnostic Provider;  [address];  Service: Anesthesiology;  Laterality: N/A;  188    REPLACEMENT OF NEPHROSTOMY TUBE N/A 10/7/2019    Procedure: REPLACEMENT, NEPHROSTOMY TUBE;  Surgeon: [facility] Diagnostic Provider;  Location: [address];  Service: Anesthesiology;  Laterality: N/A;  189    REPLACEMENT OF NEPHROSTOMY TUBE N/A 11/25/2019    Procedure: REPLACEMENT, NEPHROSTOMY TUBE;  Surgeon: [facility] Diagnostic Provider;  Location: [address];  Service: Anesthesiology;  Laterality: N/A;  Room 188/Bessy    REPLACEMENT OF NEPHROSTOMY TUBE Right 2/19/2020    Procedure: REPLACEMENT, NEPHROSTOMY TUBE removal removal;  Surgeon: Robin Boyd MD;  Location: [address];  Service: Urology;  Laterality: Right;    rt elbow surgery      S/P LAD COATED STENT  05/14/2010    6 total     S/P OM1 STENT  08/2003    SINUS SURGERY      F.E.S.S.    TRACHEOSTOMY N/A 5/2/2019    Procedure: CREATION, TRACHEOSTOMY;  Surgeon: Sean Ruano MD;  Location: [address];  Service: General;  Laterality: N/A;    TUBAL LIGATION      URETERAL REIMPLANTION Left 2/19/2020    Procedure: REIMPLANTATION, URETER WITH PSOAS HITCH;  Surgeon: Robin Boyd,  MD;  Location: [address];  Service: Urology;  Laterality: Left;       Family History   Problem Relation Age of Onset    Diabetes Mother     Heart disease Mother     Diabetes Father     Leukemia Father         leukemia    Heart attack Father     Diabetes Sister     Diabetes Brother     Diabetes Sister     No Known Problems Sister     No Known Problems Brother     No Known Problems Brother     No Known Problems Maternal Grandmother     No Known Problems Maternal Grandfather     No Known Problems Paternal Grandmother     No Known Problems Paternal Grandfather     No Known Problems Son     No Known Problems Son     No Known Problems Maternal Aunt     No Known Problems Maternal Uncle     No Known Problems Paternal Aunt     No Known Problems Paternal Uncle     Colon cancer Neg Hx     Inflammatory bowel disease Neg Hx     Melanoma Neg Hx     Psoriasis Neg Hx     Lupus Neg Hx     Eczema Neg Hx     Acne Neg Hx     Amblyopia Neg Hx     Blindness Neg Hx     Cancer Neg Hx     Cataracts Neg Hx     Glaucoma Neg Hx     Hypertension Neg Hx     Macular degeneration Neg Hx     Retinal detachment Neg Hx     Strabismus Neg Hx     Stroke Neg Hx     Thyroid disease Neg Hx     Heart failure Neg Hx     Hyperlipidemia Neg Hx        Social History     Socioeconomic History    Marital status:      Spouse name: Shamir    Number of children: 2    Years of education: Not on file    Highest education level: Not on file   Occupational History    Occupation: cafeteria     Employer: [employer]     Employer: [employer]     Employer: [employer]   Social Needs    Financial resource strain: Not on file    Food insecurity     Worry: Not on file     Inability: Not on file    Transportation needs     Medical: Not on file     Non-medical: Not on file   Tobacco Use    Smoking status: Never Smoker    Smokeless tobacco: Never Used   Substance and Sexual  Activity    Alcohol use: No     Alcohol/week: 0.0 standard drinks    Drug use: No    Sexual activity: Yes     Partners: Male     Birth control/protection: Post-menopausal     Comment:    Lifestyle    Physical activity     Days per week: Not on file     Minutes per session: Not on file    Stress: Not on file   Relationships    Social connections     Talks on phone: Not on file     Gets together: Not on file     Attends Yazidism service: Not on file     Active member of club or organization: Not on file     Attends meetings of clubs or organizations: Not on file     Relationship status: Not on file   Other Topics Concern    Are you pregnant or think you may be? No    Breast-feeding No   Social History Narrative    . 2 children.        Allergies:  Milk containing products    Medications:    Current Outpatient Medications:     ACCU-CHEK EDIN PLUS METER Misc, , Disp: , Rfl:     albuterol (PROVENTIL/VENTOLIN HFA) 90 mcg/actuation inhaler, Inhale 2 puffs into the lungs every 6 (six) hours as needed for Wheezing., Disp: 1 g, Rfl: 3    albuterol-ipratropium (DUO-NEB) 2.5 mg-0.5 mg/3 mL nebulizer solution, Inhale 3 mLs into the lungs., Disp: , Rfl:     amLODIPine (NORVASC) 10 MG tablet, TAKE 1 TABLET BY MOUTH EVERY DAY, Disp: 90 tablet, Rfl: 2    aspirin 81 mg Tab, Take 81 mg by mouth. 1 Tablet Oral Every day, Disp: , Rfl:     atorvastatin (LIPITOR) 80 MG tablet, 1 tablet (80 mg total) by Per G Tube route once daily., Disp: 90 tablet, Rfl: 3    blood sugar diagnostic (ACCU-CHEK EDIN PLUS TEST STRP) Strp, TEST BLOOD SUGARS 4 TIMES DAILY, Disp: 150 strip, Rfl: 11    blood-glucose sensor (DEXCOM G6 SENSOR) Leann, 3 each by Misc.(Non-Drug; Combo Route) route continuous prn., Disp: 3 each, Rfl: PRN    blood-glucose transmitter (DEXCOM G6 TRANSMITTER) Leann, 1 each by Misc.(Non-Drug; Combo Route) route continuous prn., Disp: 1 each, Rfl: PRN    cefadroxil (DURICEF) 1 gram tablet, TAKE 1 TABLET (1 G  TOTAL) BY MOUTH 2 (TWO) TIMES DAILY., Disp: , Rfl: 3    clopidogrel (PLAVIX) 75 mg tablet, Take 1 tablet (75 mg total) by mouth once daily., Disp: 90 tablet, Rfl: 3    dulaglutide (TRULICITY) 0.75 mg/0.5 mL PnIj, Inject 0.5 mLs (0.75 mg total) into the skin every 7 days., Disp: 4 Syringe, Rfl: 3    escitalopram oxalate (LEXAPRO) 10 MG tablet, Take 1 tablet (10 mg total) by mouth once daily., Disp: 30 tablet, Rfl: 11    eszopiclone (LUNESTA) 2 MG Tab, Take 1 tablet (2 mg total) by mouth every evening., Disp: 30 tablet, Rfl: 0    famotidine (PEPCID) 20 MG tablet, Take 1 tablet (20 mg total) by mouth 2 (two) times daily., Disp: 60 tablet, Rfl: 11    furosemide (LASIX) 40 MG tablet, Take 1 tablet (40 mg total) by mouth once daily., Disp: 30 tablet, Rfl: 11    gabapentin (NEURONTIN) 300 MG capsule, Take 1 capsule (300 mg total) by mouth 2 (two) times daily., Disp: 60 capsule, Rfl: 11    insulin aspart U-100 (NOVOLOG FLEXPEN U-100 INSULIN) 100 unit/mL (3 mL) InPn pen, Inject 6 Units into the skin 3 (three) times daily with meals., Disp: 6 mL, Rfl: 5    insulin glargine U-300 conc (TOUJEO MAX U-300 SOLOSTAR) 300 unit/mL (3 mL) InPn, Inject 18 Units into the skin every evening., Disp: 6 mL, Rfl: 5    losartan (COZAAR) 100 MG tablet, Take 1 tablet (100 mg total) by mouth once daily., Disp: 90 tablet, Rfl: 3    metoprolol tartrate (LOPRESSOR) 25 MG tablet, Take 1 tablet (25 mg total) by mouth 2 (two) times daily., Disp: 60 tablet, Rfl: 11    multivitamin (THERAGRAN) per tablet, Take 1 tablet by mouth once daily., Disp: , Rfl:     nitroGLYCERIN (NITROSTAT) 0.4 MG SL tablet, Take one every 2-3 min till chestpain relief for 3 times and if still no relief, call MD or come to ED, Disp: 30 tablet, Rfl: 11    ondansetron (ZOFRAN-ODT) 8 MG TbDL, Take 1 tablet (8 mg total) by mouth every 12 (twelve) hours as needed., Disp: 30 tablet, Rfl: 2    oxyCODONE-acetaminophen (PERCOCET) 5-325 mg per tablet, Take 1 tablet by  "mouth every 6 (six) hours as needed for Pain., Disp: 11 tablet, Rfl: 0    pen needle, diabetic (NOVOTWIST) 32 gauge x 1/5" Ndle, Use 1 needle to inject insulin four times daily, Disp: 400 each, Rfl: 3    polyethylene glycol (GLYCOLAX) 17 gram PwPk, Mix 1 capful (17 g) in liquid and take by mouth once daily., Disp: 14 each, Rfl: 0    potassium chloride SA (K-DUR,KLOR-CON) 20 MEQ tablet, Take 1 tablet (20 mEq total) by mouth 2 (two) times daily., Disp: 60 tablet, Rfl: 11    nitrofurantoin (MACRODANTIN) 100 MG capsule, Take 1 capsule (100 mg total) by mouth nightly. for 10 days, Disp: 100 capsule, Rfl: 0    zolpidem (AMBIEN) 5 MG Tab, Take 1 tablet (5 mg total) by mouth nightly as needed., Disp: 30 tablet, Rfl: 2    Current Facility-Administered Medications:     fluorescein 500 mg/5 mL (10 %) injection 500 mg, 5 mL, Intravenous, Once, CHARLOTTE Doshi MD    Facility-Administered Medications Ordered in Other Visits:     lidocaine (PF) 10 mg/ml (1%) injection 10 mg, 1 mL, Intradermal, Once, Dave Bentley Jr., MD    PHYSICAL EXAMINATION:    Constitutional: She appears well-developed and well-nourished.  She is in no apparent distress.    Eyes: No scleral icterus noted bilaterally. No discharge bilaterally.    Nose: No rhinorrhea    Cardiovascular: Normal rate.  No pitting edema noted in lower extremities bilaterally    Pulmonary/Chest: Effort normal. No respiratory distress.     Abdominal:  She exhibits no distension.      Neurological: She is alert and oriented to person, place, and time.     Skin: Skin is warm and dry.     Psych: Cooperative with normal affect.    Fontenot cathter in place.     Physical Exam   Constitutional: She is oriented to person, place, and time. She appears well-developed. No distress.   HENT:   Head: Normocephalic and atraumatic.   Right Ear: External ear normal.   Left Ear: External ear normal.   Nose: Nose normal.   Eyes: Conjunctivae are normal. Pupils are equal, round, and " reactive to light. No scleral icterus.   Neck: Normal range of motion. Neck supple. No JVD present. No tracheal deviation present. No thyromegaly present.   Cardiovascular: Normal rate, regular rhythm and normal heart sounds.  Exam reveals no gallop and no friction rub.    No murmur heard.  Pulmonary/Chest: Effort normal and breath sounds normal. No respiratory distress. She has no wheezes.   Abdominal: Soft. Bowel sounds are normal. She exhibits no distension and no mass. There is no abdominal tenderness. There is no rebound and no guarding.   Genitourinary:    Vagina normal.      No vaginal discharge.     Musculoskeletal: Normal range of motion. No tenderness or deformity.   Lymphadenopathy:     She has no cervical adenopathy.   Neurological: She is alert and oriented to person, place, and time.   Skin: Skin is warm and dry. She is not diaphoretic.     Psychiatric: Her behavior is normal. Thought content normal.     UA today trace of blood, protein, and WBCs  PVR per bladder scan: 0 ml    Radiology  Renal US 6/18/20  Findings consistent with chronic medical renal disease.     Mildly complex left renal cyst.     Bilateral simple renal cysts.     Interval removal of percutaneous nephrostomy tube.    IMPRESSION:  Left ureteral injury, subsequent encounter  -     US Kidney; Future; Expected date: 06/18/2020    Hydronephrosis, unspecified hydronephrosis type  -     US Kidney; Future; Expected date: 06/18/2020  -     Basic metabolic panel; Future; Expected date: 06/18/2020    Type 2 diabetes mellitus with stage 2 chronic kidney disease, without long-term current use of insulin  -     Hemoglobin A1C; Future; Expected date: 06/18/2020    Type 2 diabetes mellitus with stage 2 chronic kidney disease and hypertension    Recurrent UTI  -     nitrofurantoin (MACRODANTIN) 100 MG capsule; Take 1 capsule (100 mg total) by mouth nightly. for 10 days  Dispense: 100 capsule; Refill: 0  -     Urine culture; Future; Expected date:  06/18/2020      PLAN:  4 months out from boari flap left ureteral reimplant.  No more ureteral stent.  Creatinine is stable 1.2.  Will follow up in 3 months with BMP and renal US.    Recommend a better control of her diabetes.  Her Hemoglobin A1c is 8.0.  She will see an endocrinologist.    For suppressive abx, I will continue it until next visit.  Continue Probiotics daiyl  She takes Percocet for her chronic back pain.  Recommend to continue Miralax to control her constipaiton.    Follow up in about 3 months (around 9/18/2020) for BMP, US.

## 2020-06-20 LAB — BACTERIA UR CULT: ABNORMAL

## 2020-06-22 ENCOUNTER — TELEPHONE (OUTPATIENT)
Dept: UROLOGY | Facility: CLINIC | Age: 60
End: 2020-06-22

## 2020-06-22 DIAGNOSIS — N30.00 ACUTE CYSTITIS WITHOUT HEMATURIA: Primary | ICD-10-CM

## 2020-06-22 RX ORDER — AMOXICILLIN 500 MG/1
500 TABLET, FILM COATED ORAL EVERY 12 HOURS
Qty: 10 TABLET | Refills: 0 | Status: SHIPPED | OUTPATIENT
Start: 2020-06-22 | End: 2020-06-27

## 2020-06-22 NOTE — TELEPHONE ENCOUNTER
Acute cystitis without hematuria  -     amoxicillin (AMOXIL) 500 MG Tab; Take 1 tablet (500 mg total) by mouth every 12 (twelve) hours. for 10 doses  Dispense: 10 tablet; Refill: 0    please ask her to take Amoxil for 5 days.  Then she can resume her suppressive abx nitrofurantoin daily.  Please ask her to take Probitotics, D-mannose 1 g BID.

## 2020-06-29 ENCOUNTER — PATIENT OUTREACH (OUTPATIENT)
Dept: ADMINISTRATIVE | Facility: OTHER | Age: 60
End: 2020-06-29

## 2020-06-29 ENCOUNTER — OFFICE VISIT (OUTPATIENT)
Dept: NEUROSURGERY | Facility: CLINIC | Age: 60
End: 2020-06-29
Payer: COMMERCIAL

## 2020-06-29 ENCOUNTER — HOSPITAL ENCOUNTER (OUTPATIENT)
Dept: RADIOLOGY | Facility: HOSPITAL | Age: 60
Discharge: HOME | End: 2020-06-29
Attending: NEUROLOGICAL SURGERY
Payer: COMMERCIAL

## 2020-06-29 VITALS — HEIGHT: 64 IN | BODY MASS INDEX: 26.79 KG/M2 | WEIGHT: 156.94 LBS

## 2020-06-29 DIAGNOSIS — M53.3 SACROILIAC JOINT DYSFUNCTION OF RIGHT SIDE: ICD-10-CM

## 2020-06-29 DIAGNOSIS — Z98.1 S/P LUMBAR AND LUMBOSACRAL FUSION BY ANTERIOR TECHNIQUE: Primary | ICD-10-CM

## 2020-06-29 DIAGNOSIS — M43.27 FUSION OF SPINE, LUMBOSACRAL REGION: ICD-10-CM

## 2020-06-29 PROCEDURE — 99024 PR POST-OP FOLLOW-UP VISIT: ICD-10-PCS | Mod: S$GLB,,, | Performed by: NEUROLOGICAL SURGERY

## 2020-06-29 PROCEDURE — 72100 X-RAY EXAM L-S SPINE 2/3 VWS: CPT | Mod: TC,PN

## 2020-06-29 PROCEDURE — 72100 X-RAY EXAM L-S SPINE 2/3 VWS: CPT | Mod: 26,,, | Performed by: RADIOLOGY

## 2020-06-29 PROCEDURE — 99999 PR PBB SHADOW E&M-EST. PATIENT-LVL IV: CPT | Mod: PBBFAC,,, | Performed by: NEUROLOGICAL SURGERY

## 2020-06-29 PROCEDURE — 99024 POSTOP FOLLOW-UP VISIT: CPT | Mod: S$GLB,,, | Performed by: NEUROLOGICAL SURGERY

## 2020-06-29 PROCEDURE — 72100 XR LUMBAR SPINE AP AND LATERAL: ICD-10-PCS | Mod: 26,,, | Performed by: RADIOLOGY

## 2020-06-29 PROCEDURE — 99999 PR PBB SHADOW E&M-EST. PATIENT-LVL IV: ICD-10-PCS | Mod: PBBFAC,,, | Performed by: NEUROLOGICAL SURGERY

## 2020-06-29 RX ORDER — OXYCODONE AND ACETAMINOPHEN 5; 325 MG/1; MG/1
1 TABLET ORAL EVERY 8 HOURS PRN
Qty: 28 TABLET | Refills: 0 | Status: SHIPPED | OUTPATIENT
Start: 2020-06-29 | End: 2020-11-16

## 2020-06-29 NOTE — PROGRESS NOTES
NEUROSURGICAL PROGRESS NOTE    DATE OF SERVICE:  06/29/2020    ATTENDING PHYSICIAN:  Mk George MD    SUBJECTIVE:    INTERIM HISTORY:    This is a very pleasant 60 y.o. female, who is status post L5-S1 oblique interbody fusion 14 months ago with ureteral injury.  Four months ago she had Borary flap with left ureteral reimplant on 2/19/20 with Dr Andrea.  She has recovered well from her surgery.  She has persistent right SI joint pain radiating towards the right buttock.  Her right dorsiflexion weakness has improved.  She is walking better using a cane.  She is using a treadmill at home.  No new onset of motor weakness, numbness or sphincter dysfunction symptoms.  She has completed physical therapy.  She is not interested and spinal injection at this time.              PAST MEDICAL HISTORY:  Active Ambulatory Problems     Diagnosis Date Noted    Hypertension associated with diabetes     Hyperlipidemia associated with type 2 diabetes mellitus     CAD (coronary artery disease)     Sleep apnea     Carotid stenosis, bilateral 10/24/2012    History of non-ST elevation myocardial infarction (NSTEMI) 02/17/2014    S/P coronary artery stent placement 02/26/2014    Nuclear sclerosis - Right Eye 03/18/2014    Primary localized osteoarthrosis, lower leg 06/18/2014    Chronic sinusitis 05/07/2015    PSC (posterior subcapsular cataract), right 08/17/2015    DME (diabetic macular edema) 08/17/2015    Refractive error 08/17/2015    Pseudophakia 08/17/2015    Senile nuclear sclerosis 09/01/2015    Type 2 diabetes mellitus with diabetic peripheral angiopathy without gangrene 02/24/2016    Diabetic peripheral neuropathy associated with type 2 diabetes mellitus 02/24/2016    Cervical arthritis 08/29/2016    Neurogenic claudication 08/29/2016    Lumbar herniated disc 10/24/2016    Fatty liver disease, nonalcoholic 11/01/2017    Arthritis of lumbar spine 07/23/2018    Chronic pain 09/25/2018    Lumbar  radiculopathy 02/06/2019    Lumbosacral radiculopathy at L5 04/12/2019    Ureteral transection of left native kidney 04/13/2019    Type 2 diabetes mellitus with stage 2 chronic kidney disease and hypertension 04/20/2019    Asthma 04/20/2019    Normocytic anemia     Bradycardia 05/08/2019    Delayed surgical wound healing 05/13/2019    Rectal ulcer     Palliative care encounter 05/27/2019    Acute deep vein thrombosis (DVT) of femoral vein of right lower extremity     Impaired functional mobility and endurance 05/31/2019    (HFpEF) heart failure with preserved ejection fraction 06/06/2019    Alteration in skin integrity 06/07/2019    Debility 06/10/2019    Staph infection     Chronic combined systolic and diastolic CHF (congestive heart failure) 07/02/2019    Left ureteral injury 07/24/2019    Hydronephrosis 10/07/2019    Serum albumin decreased 11/01/2019    Weakness of both lower extremities 11/11/2019    Poor balance 11/11/2019    Intraoperative ureteral injury 12/11/2019    Uncontrolled type 2 diabetes mellitus with both eyes affected by severe nonproliferative retinopathy and macular edema, with long-term current use of insulin 01/09/2020    Hypertensive retinopathy, bilateral 01/09/2020     Resolved Ambulatory Problems     Diagnosis Date Noted    Diabetes mellitus type II, uncontrolled 10/24/2012    Obesity 10/24/2012    Neck pain 03/07/2013    Non compliance with medical treatment 06/19/2013    PAPA (acute kidney injury) 02/17/2014    Coronary stent restenosis 02/26/2014    Type II or unspecified type diabetes mellitus with other specified manifestations, uncontrolled 06/06/2014    Peripheral neuropathy 06/06/2014    Enthesopathy of hip region 06/18/2014    Type II or unspecified type diabetes mellitus with peripheral circulatory disorders, uncontrolled(250.72) 09/25/2014    Diabetes mellitus with peripheral artery disease 02/02/2015    Dysphagia 08/12/2016    Neck pain on  right side 10/14/2016    Chronic bilateral low back pain with sciatica 10/14/2016    Decreased range of motion 10/14/2016    Decreased strength 10/14/2016    Decreased functional mobility 10/14/2016    Closed nondisplaced fracture of fifth metatarsal bone of right foot with routine healing 07/14/2017    Ureteral stent retained 04/16/2019    Sepsis 04/20/2019    Encephalopathy 04/20/2019    Altered mental status     Encounter for weaning from ventilator     Metabolic acidosis     At risk for fluid volume overload 04/21/2019    On enteral nutrition 04/24/2019    Postoperative infection     Acute postoperative respiratory failure     Pulmonary edema     Dermatitis associated with moisture 05/06/2019    BRBPR (bright red blood per rectum) 05/07/2019    Urinary tract infection without hematuria     Status post insertion of percutaneous endoscopic gastrostomy (PEG) tube     Pain     Protein malnutrition     Acute on chronic respiratory failure with hypoxia 06/06/2019    Elevated troponin 06/06/2019    Fever 06/07/2019    Pneumonia of both lungs due to Pseudomonas species 06/09/2019    Acute combined systolic and diastolic heart failure 06/13/2019    Excessive carbohydrate intake 06/14/2019    Tracheostomy in place     Nephrostomy status 07/11/2019    Nephrostomy tube displaced 07/18/2019    Weakness of both lower extremities 08/19/2019    Poor balance 08/19/2019    Gait, antalgic 08/19/2019     Past Medical History:   Diagnosis Date    Anticoagulant long-term use     Asthma     Back pain     Bradycardia, unspecified 5/8/2019    Carotid artery stenosis     Diabetes mellitus type 2 in obese     HTN (hypertension), benign     Hyperlipidemia     Keloid cicatrix     NSTEMI (non-ST elevated myocardial infarction)        PAST SURGICAL HISTORY:  Past Surgical History:   Procedure Laterality Date    ANTEGRADE NEPHROSTOGRAPHY Left 12/11/2019    Procedure: Nephrostogram - antegrade;   Surgeon: Robin Boyd MD;  [facility];  Service: Urology;  Laterality: Left;    BRONCHOSCOPY N/A 2019    Procedure: BRONCHOSCOPY;  Surgeon: Sean Ruano MD;  [facility];  Service: General;  Laterality: N/A;    CARDIAC CATHETERIZATION      CATARACT EXTRACTION      cataract extraction left eye      cataracts      CAUDAL EPIDURAL STEROID INJECTION N/A 2019    Procedure: Injection-steroid-epidural-caudal;  Surgeon: Dave Bentley Jr., MD;  Location: [facility]IN [facility]GT;  Service: Pain Management;  Laterality: N/A;     SECTION, LOW TRANSVERSE      COLONOSCOPY N/A 2019    Procedure: COLONOSCOPY;  Surgeon: Al Alaniz MD;  [facility]  Service: Endoscopy;  Laterality: N/A;    CORONARY ANGIOPLASTY      CYSTOGRAM N/A 2019    Procedure: CYSTOGRAM INTRAOP;  Surgeon: Robin Boyd MD;  [facility];  Service: Urology;  Laterality: N/A;  1 HOUR    CYSTOSCOPY W/ RETROGRADES Left 2019    Procedure: CYSTOSCOPY, WITH RETROGRADE PYELOGRAM;  Surgeon: Robin Boyd MD;  [facility];  Service: Urology;  Laterality: Left;  fluro    ESOPHAGOGASTRODUODENOSCOPY W/ PEG  2019    Procedure: EGD, WITH PEG TUBE INSERTION;  Surgeon: Sean Ruano MD;  [facility];  Service: General;;    EXCISION TURBINATE, SUBMUCOUS      FLEXIBLE SIGMOIDOSCOPY N/A 2019    Procedure: SIGMOIDOSCOPY, FLEXIBLE;  Surgeon: ALBERTO Amin MD;  [facility]  Service: Endoscopy;  Laterality: N/A;    FLEXIBLE SIGMOIDOSCOPY N/A 2019    Procedure: SIGMOIDOSCOPY, FLEXIBLE;  Surgeon: ALBERTO Amin MD;  [facility]  Service: Endoscopy;  Laterality: N/A;    FUSION OF LUMBAR SPINE BY ANTERIOR APPROACH Left 2019    Procedure: FUSION, SPINE, LUMBAR, ANTERIOR APPROACH Left L5-S1 Anterior to Psoa Interbody Fusion, L5-S1 Posterior Instrumentation;  Surgeon: Mk LUX  MD Ariel;  [address]  Service: Neurosurgery;  Laterality: Left;  Porcedure:  Left L5-S1 Anterior to Psoa Interbody Fusion, L5-S1 Posterior Instrumentation  Surgery Time: 4 Hrs  LOS: 2-3  Anesthesia: General   Blood: Type & Screen  R    HAND SURGERY Left     HAND SURGERY Right     torn ligament repair/ Dr. Yeboah    HYSTERECTOMY      left foot surgery      left wrist surgery      LYSIS OF ADHESIONS N/A 2/19/2020    Procedure: LYSIS, ADHESIONS;  Surgeon: Robin Boyd MD;  Location: [address];  Service: Urology;  Laterality: N/A;    NASAL SEPTUM SURGERY  5/7/15    PERCUTANEOUS NEPHROSTOMY Left 4/21/2019    Procedure: Creation, Nephrostomy, Percutaneous;  Surgeon: Karina Surgeon;  Location: [facility];  Service: Anesthesiology;  Laterality: Left;    REPAIR OF URETER  4/12/2019    Procedure: REPAIR, URETER;  Surgeon: Mk George MD;  Location: [address];  Service: Neurosurgery;;    REPLACEMENT OF NEPHROSTOMY TUBE N/A 7/18/2019    Procedure: REPLACEMENT, NEPHROSTOMY TUBE;  Surgeon: [facility] Provider;  [address];  Service: Anesthesiology;  Laterality: N/A;  188    REPLACEMENT OF NEPHROSTOMY TUBE N/A 7/24/2019    Procedure: REPLACEMENT, NEPHROSTOMY TUBE;  Surgeon: [facility] Diagnostic Provider;  [address]  Service: Anesthesiology;  Laterality: N/A;  188    REPLACEMENT OF NEPHROSTOMY TUBE N/A 10/7/2019    Procedure: REPLACEMENT, NEPHROSTOMY TUBE;  Surgeon: [facility] Provider;  [address];  Service: Anesthesiology;  Laterality: N/A;  189    REPLACEMENT OF NEPHROSTOMY TUBE N/A 11/25/2019    Procedure: REPLACEMENT, NEPHROSTOMY TUBE;  Surgeon: D[facility] Diagnostic Provider;  [facility];  Service: Anesthesiology;  Laterality: N/A;  Room 188/Bessy    REPLACEMENT OF NEPHROSTOMY TUBE Right 2/19/2020    Procedure: REPLACEMENT, NEPHROSTOMY TUBE removal removal;  Surgeon: Robin Body MD;  Location: [address];   Service: Urology;  Laterality: Right;    rt elbow surgery      S/P LAD COATED STENT  05/14/2010    6 total     S/P OM1 STENT  08/2003    SINUS SURGERY      F.E.S.S.    TRACHEOSTOMY N/A 5/2/2019    Procedure: CREATION, TRACHEOSTOMY;  Surgeon: Sean Ruano MD;  Location: [facility] OR [address] [place] [address];  Service: General;  Laterality: N/A;    TUBAL LIGATION      URETERAL REIMPLANTION Left 2/19/2020    Procedure: REIMPLANTATION, URETER WITH PSOAS HITCH;  Surgeon: Robin Boyd MD;  Location: [facility] OR [address] [place] [address];  Service: Urology;  Laterality: Left;       SOCIAL HISTORY:   Social History     Socioeconomic History    Marital status:      Spouse name: Shamir    Number of children: 2    Years of education: Not on file    Highest education level: Not on file   Occupational History    Occupation: cafeteria     Employer: [employer]     Employer: [employer]     Employer: [employer]   Social Needs    Financial resource strain: Not on file    Food insecurity     Worry: Not on file     Inability: Not on file    Transportation needs     Medical: Not on file     Non-medical: Not on file   Tobacco Use    Smoking status: Never Smoker    Smokeless tobacco: Never Used   Substance and Sexual Activity    Alcohol use: No     Alcohol/week: 0.0 standard drinks    Drug use: No    Sexual activity: Yes     Partners: Male     Birth control/protection: Post-menopausal     Comment:    Lifestyle    Physical activity     Days per week: Not on file     Minutes per session: Not on file    Stress: Not on file   Relationships    Social connections     Talks on phone: Not on file     Gets together: Not on file     Attends Yarsanism service: Not on file     Active member of club or organization: Not on file     Attends meetings of clubs or organizations: Not on file     Relationship status: Not on file   Other Topics Concern    Are you pregnant or think you may be? No    Breast-feeding No    Social History Narrative    . 2 children.        FAMILY HISTORY:  Family History   Problem Relation Age of Onset    Diabetes Mother     Heart disease Mother     Diabetes Father     Leukemia Father         leukemia    Heart attack Father     Diabetes Sister     Diabetes Brother     Diabetes Sister     No Known Problems Sister     No Known Problems Brother     No Known Problems Brother     No Known Problems Maternal Grandmother     No Known Problems Maternal Grandfather     No Known Problems Paternal Grandmother     No Known Problems Paternal Grandfather     No Known Problems Son     No Known Problems Son     No Known Problems Maternal Aunt     No Known Problems Maternal Uncle     No Known Problems Paternal Aunt     No Known Problems Paternal Uncle     Colon cancer Neg Hx     Inflammatory bowel disease Neg Hx     Melanoma Neg Hx     Psoriasis Neg Hx     Lupus Neg Hx     Eczema Neg Hx     Acne Neg Hx     Amblyopia Neg Hx     Blindness Neg Hx     Cancer Neg Hx     Cataracts Neg Hx     Glaucoma Neg Hx     Hypertension Neg Hx     Macular degeneration Neg Hx     Retinal detachment Neg Hx     Strabismus Neg Hx     Stroke Neg Hx     Thyroid disease Neg Hx     Heart failure Neg Hx     Hyperlipidemia Neg Hx        CURRENTS MEDICATIONS:  Current Outpatient Medications on File Prior to Visit   Medication Sig Dispense Refill    ACCU-CHEK EDIN PLUS METER Misc       albuterol (PROVENTIL/VENTOLIN HFA) 90 mcg/actuation inhaler Inhale 2 puffs into the lungs every 6 (six) hours as needed for Wheezing. 1 g 3    albuterol-ipratropium (DUO-NEB) 2.5 mg-0.5 mg/3 mL nebulizer solution Inhale 3 mLs into the lungs.      amLODIPine (NORVASC) 10 MG tablet TAKE 1 TABLET BY MOUTH EVERY DAY 90 tablet 2    aspirin 81 mg Tab Take 81 mg by mouth. 1 Tablet Oral Every day      atorvastatin (LIPITOR) 80 MG tablet 1 tablet (80 mg total) by Per G Tube route once daily. 90 tablet 3    blood sugar  diagnostic (ACCU-CHEK EDIN PLUS TEST STRP) Strp TEST BLOOD SUGARS 4 TIMES DAILY 150 strip 11    blood-glucose sensor (DEXCOM G6 SENSOR) Leann 3 each by Misc.(Non-Drug; Combo Route) route continuous prn. 3 each PRN    blood-glucose transmitter (DEXCOM G6 TRANSMITTER) Leann 1 each by Misc.(Non-Drug; Combo Route) route continuous prn. 1 each PRN    cefadroxil (DURICEF) 1 gram tablet TAKE 1 TABLET (1 G TOTAL) BY MOUTH 2 (TWO) TIMES DAILY.  3    clopidogrel (PLAVIX) 75 mg tablet Take 1 tablet (75 mg total) by mouth once daily. 90 tablet 3    dulaglutide (TRULICITY) 0.75 mg/0.5 mL PnIj Inject 0.5 mLs (0.75 mg total) into the skin every 7 days. 4 Syringe 3    escitalopram oxalate (LEXAPRO) 10 MG tablet Take 1 tablet (10 mg total) by mouth once daily. 30 tablet 11    eszopiclone (LUNESTA) 2 MG Tab Take 1 tablet (2 mg total) by mouth every evening. 30 tablet 0    famotidine (PEPCID) 20 MG tablet Take 1 tablet (20 mg total) by mouth 2 (two) times daily. 60 tablet 11    furosemide (LASIX) 40 MG tablet Take 1 tablet (40 mg total) by mouth once daily. 30 tablet 11    gabapentin (NEURONTIN) 300 MG capsule Take 1 capsule (300 mg total) by mouth 2 (two) times daily. 60 capsule 11    insulin aspart U-100 (NOVOLOG FLEXPEN U-100 INSULIN) 100 unit/mL (3 mL) InPn pen Inject 6 Units into the skin 3 (three) times daily with meals. 6 mL 5    insulin glargine U-300 conc (TOUJEO MAX U-300 SOLOSTAR) 300 unit/mL (3 mL) InPn Inject 18 Units into the skin every evening. 6 mL 5    losartan (COZAAR) 100 MG tablet Take 1 tablet (100 mg total) by mouth once daily. 90 tablet 3    metoprolol tartrate (LOPRESSOR) 25 MG tablet Take 1 tablet (25 mg total) by mouth 2 (two) times daily. 60 tablet 11    multivitamin (THERAGRAN) per tablet Take 1 tablet by mouth once daily.      oxyCODONE-acetaminophen (PERCOCET) 5-325 mg per tablet Take 1 tablet by mouth every 6 (six) hours as needed for Pain. 11 tablet 0    pen needle, diabetic (NOVOTWIST)  "32 gauge x 1/5" Ndle Use 1 needle to inject insulin four times daily 400 each 3    polyethylene glycol (GLYCOLAX) 17 gram PwPk Mix 1 capful (17 g) in liquid and take by mouth once daily. 14 each 0    potassium chloride SA (K-DUR,KLOR-CON) 20 MEQ tablet Take 1 tablet (20 mEq total) by mouth 2 (two) times daily. 60 tablet 11    [] nitrofurantoin (MACRODANTIN) 100 MG capsule Take 1 capsule (100 mg total) by mouth nightly. for 10 days 100 capsule 0    nitroGLYCERIN (NITROSTAT) 0.4 MG SL tablet Take one every 2-3 min till chestpain relief for 3 times and if still no relief, call MD or come to ED (Patient not taking: Reported on 2020) 30 tablet 11    ondansetron (ZOFRAN-ODT) 8 MG TbDL Take 1 tablet (8 mg total) by mouth every 12 (twelve) hours as needed. (Patient not taking: Reported on 2020) 30 tablet 2    zolpidem (AMBIEN) 5 MG Tab Take 1 tablet (5 mg total) by mouth nightly as needed. 30 tablet 2     Current Facility-Administered Medications on File Prior to Visit   Medication Dose Route Frequency Provider Last Rate Last Dose    fluorescein 500 mg/5 mL (10 %) injection 500 mg  5 mL Intravenous Once DChinmay Doshi MD        lidocaine (PF) 10 mg/ml (1%) injection 10 mg  1 mL Intradermal Once Dave Bentley Jr., MD           ALLERGIES:  Review of patient's allergies indicates:   Allergen Reactions    Milk containing products      Causes GI distress       REVIEW OF SYSTEMS:  Review of Systems   Constitutional: Negative for diaphoresis, fever and weight loss.   Respiratory: Negative for shortness of breath.    Cardiovascular: Negative for chest pain.   Gastrointestinal: Negative for blood in stool.   Genitourinary: Negative for hematuria.   Endo/Heme/Allergies: Does not bruise/bleed easily.   All other systems reviewed and are negative.        OBJECTIVE:    PHYSICAL EXAMINATION:   There were no vitals filed for this visit.    Physical Exam:  Vitals reviewed.    Constitutional: She appears " well-developed and well-nourished.     Eyes: Pupils are equal, round, and reactive to light. Conjunctivae and EOM are normal.     Cardiovascular: Normal distal pulses and no edema.     Abdominal: Soft.     Skin: Skin displays no rash on trunk and no rash on extremities. Skin displays no lesions on trunk and no lesions on extremities.     Psych/Behavior: She is alert. She is oriented to person, place, and time. She has a normal mood and affect.     Musculoskeletal:        Neck: Range of motion is full.     Neurological:        DTRs: Tricep reflexes are 2+ on the right side and 2+ on the left side. Bicep reflexes are 2+ on the right side and 2+ on the left side. Brachioradialis reflexes are 2+ on the right side and 2+ on the left side. Patellar reflexes are 2+ on the right side and 2+ on the left side. Achilles reflexes are 2+ on the right side and 2+ on the left side.       Back Exam     Tenderness   The patient is experiencing tenderness in the sacroiliac (Right side).    Range of Motion   Extension: normal   Flexion: normal   Lateral bend right: normal   Lateral bend left: normal   Rotation right: normal   Rotation left: normal     Muscle Strength   Right Quadriceps:  5/5   Left Quadriceps:  5/5   Right Hamstrings:  5/5   Left Hamstrings:  5/5     Tests   Straight leg raise right: negative  Straight leg raise left: negative    Other   Toe walk: normal  Heel walk: normal                Neurologic Exam     Mental Status   Oriented to person, place, and time.   Speech: speech is normal   Level of consciousness: alert    Cranial Nerves   Cranial nerves II through XII intact.     CN III, IV, VI   Pupils are equal, round, and reactive to light.  Extraocular motions are normal.     Motor Exam   Muscle bulk: normal  Overall muscle tone: normal    Strength   Right deltoid: 5/5  Left deltoid: 5/5  Right biceps: 5/5  Left biceps: 5/5  Right triceps: 5/5  Left triceps: 5/5  Right wrist flexion: 5/5  Left wrist flexion:  5/5  Right wrist extension: 5/5  Left wrist extension: 5/5  Right interossei: 5/5  Left interossei: 5/5  Right iliopsoas: 5/5  Left iliopsoas: 5/5  Right quadriceps: 5/5  Left quadriceps: 5/5  Right hamstrin/5  Left hamstrin/5  Right anterior tibial: 5/5  Left anterior tibial: 5/5  Right posterior tibial: 5/5  Left posterior tibial: 5/5  Right peroneal: 5/5  Left peroneal: 5/5  Right gastroc: 5/5  Left gastroc: 5/5    Sensory Exam   Light touch normal.   Pinprick normal.     Gait, Coordination, and Reflexes     Gait  Gait: normal    Coordination   Finger to nose coordination: normal  Tandem walking coordination: normal    Reflexes   Right brachioradialis: 2+  Left brachioradialis: 2+  Right biceps: 2+  Left biceps: 2+  Right triceps: 2+  Left triceps: 2+  Right patellar: 2+  Left patellar: 2+  Right achilles: 2+  Left achilles: 2+  Right plantar: normal  Left plantar: normal  Right Crump: absent  Left Crump: absent  Right ankle clonus: absent  Left ankle clonus: absent    FORD test is positive on the right side    DIAGNOSTIC DATA:  I personally interpreted the following imaging:   Lumbar x-ray today shows consolidation of the L5-S1 fusion    ASSESMENT:  This is a 60 y.o. female with     Problem List Items Addressed This Visit     None      Visit Diagnoses     S/P lumbar and lumbosacral fusion by anterior technique    -  Primary    Sacroiliac joint dysfunction of right side                PLAN:  Could benefit from a right SI joint block, however due to uncontrolled diabetes I would not recommend to inject steroids  Continue home physical therapy exercises  Percocet 5/325 mg 28 pills ordered  Pain contract ordered  All questions answered           Mk George MD  Cell:[phone]

## 2020-06-30 NOTE — SUBJECTIVE & OBJECTIVE
[facility]      OUTPATIENT PHYSICAL THERAPY  PLAN OF TREATMENT FOR OUTPATIENT REHABILITATION          Patient's Last Name, First Name, M.I.                  YOB: 1956  Marcus Hodges                        Provider's Name  [facility] Medical Record No.  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     Ada Scott,MPT                               I CERTIFY THE NEED FOR THESE SERVICES FURNISHED UNDER        THIS PLAN OF TREATMENT AND WHILE UNDER MY CARE     (Physician co-signature of this document indicates review and certification of the therapy plan).                Referring Provider: Dr. Don Aviles     Interval History/Significant Events:  No bloody bm overnight. Only rifampin orange stool. Did well from pain cotnrol perspecitve. UO 40 cc from nephrostomy tube. LUE swelling noted yesterday by ID, again noted today. Will order US to rule out DVT. ID recs dc vanc and zosyn and start ancef and continue rifampin.   Follow-up For: Procedure(s) (LRB):  CREATION, TRACHEOSTOMY (N/A)  BRONCHOSCOPY (N/A)  EGD, WITH PEG TUBE INSERTION    Post-Operative Day: 9 Days Post-Op    Objective:     Vital Signs (Most Recent):  Temp: 98.2 °F (36.8 °C) (05/12/19 0300)  Pulse: 92 (05/12/19 0705)  Resp: 19 (05/12/19 0705)  BP: (!) 161/80 (05/12/19 0705)  SpO2: 100 % (05/12/19 0705) Vital Signs (24h Range):  Temp:  [97.7 °F (36.5 °C)-99.8 °F (37.7 °C)] 98.2 °F (36.8 °C)  Pulse:  [] 92  Resp:  [14-44] 19  SpO2:  [100 %] 100 %  BP: (107-170)/(55-90) 161/80  Arterial Line BP: (113-166)/(51-97) 138/51     Weight: 76.9 kg (169 lb 8.5 oz)  Body mass index is 28.21 kg/m².      Intake/Output Summary (Last 24 hours) at 5/12/2019 0743  Last data filed at 5/12/2019 0600  Gross per 24 hour   Intake 1469 ml   Output 731 ml   Net 738 ml       Physical Exam   Constitutional: She appears well-developed and well-nourished.   HENT:   Head: Normocephalic and atraumatic.   Tracheostomy in place   Eyes: Right eye exhibits no discharge. Left eye exhibits no discharge.   Neck: Normal range of motion. Neck supple.   Cardiovascular: Normal rate and regular rhythm.   Pulmonary/Chest: Effort normal. No respiratory distress.   Abdominal:   Midline incision c/d/i.  MADHURI with scant serous drainage. PEG with no leak. Abdomen soft, non-distended.  Right femoral lines in place, dressing CDI.   Genitourinary:   Genitourinary Comments: Nephrostomy tube in place with watery dark yellow output.  Fontenot catheter+   Musculoskeletal: Normal range of motion.   Neurological:   Able to follow commands, denying pain.    Skin: Skin is warm and dry.   Vitals  reviewed.      Vents:  Vent Mode: Spont (05/12/19 0705)  Ventilator Initiated: Yes (05/08/19 0630)  Set Rate: 20 bmp (05/09/19 0506)  Vt Set: 400 mL (05/09/19 0506)  Pressure Support: 0 cmH20 (04/29/19 1014)  PEEP/CPAP: 5 cmH20 (05/12/19 0705)  Oxygen Concentration (%): 40 (05/12/19 0705)  Peak Airway Pressure: 30 cmH2O (05/12/19 0705)  Plateau Pressure: 0 cmH20 (05/12/19 0705)  Total Ve: 8.84 mL (05/12/19 0705)  Negative Inspiratory Force (cm H2O): -18 (04/27/19 1306)  F/VT Ratio<105 (RSBI): (!) 58.64 (05/12/19 0705)    Lines/Drains/Airways     Drain                 Closed/Suction Drain 04/21/19 0300 LLQ 21 days         Nephrostomy 04/21/19 0629 Left 8 Fr. 21 days         Gastrostomy/Enterostomy 05/02/19 1134 Percutaneous endoscopic gastrostomy (PEG) LUQ decompression;feeding 9 days          Airway                 Surgical Airway 05/02/19 1107 Shiley Cuffed 9 days          Arterial Line                 Arterial Line 05/08/19 0743 Right Femoral 4 days          Peripheral Intravenous Line                 Midline Catheter Insertion/Assessment  - Single Lumen 05/07/19 1632 Left basilic vein (medial side of arm) 18g x 8cm 4 days         Peripheral IV - Single Lumen 05/10/19 1020 22 G Right Upper Arm 1 day                Significant Labs:    CBC/Anemia Profile:  Recent Labs   Lab 05/11/19  0901 05/11/19  1613 05/12/19  0022   WBC 19.39* 15.51* 13.64*   HGB 8.6* 8.9* 8.3*   HCT 27.2* 27.2* 26.4*   * 372* 341   MCV 91 91 90   RDW 15.2* 15.2* 15.1*        Chemistries:  Recent Labs   Lab 05/11/19  0433 05/12/19  0359    144   K 4.3 3.9    110   CO2 26 25   BUN 31* 32*   CREATININE 1.2 1.1   CALCIUM 8.5* 8.5*   ALBUMIN 1.5* 1.6*   PROT 5.8* 5.9*   BILITOT 1.4* 0.7   ALKPHOS 102 91   ALT 13 12   AST 20 15   MG 2.2 2.2   PHOS 3.6 2.8       Significant Imaging:  I have reviewed all pertinent imaging results/findings within the past 24 hours.

## 2020-07-20 RX ORDER — ATORVASTATIN CALCIUM 80 MG/1
80 TABLET, FILM COATED ORAL DAILY
Qty: 90 TABLET | Refills: 3 | Status: SHIPPED | OUTPATIENT
Start: 2020-07-20 | End: 2021-06-25

## 2020-07-20 RX ORDER — FUROSEMIDE 40 MG/1
40 TABLET ORAL DAILY
Qty: 90 TABLET | Refills: 3 | Status: SHIPPED | OUTPATIENT
Start: 2020-07-20 | End: 2021-05-18

## 2020-07-20 RX ORDER — ESCITALOPRAM OXALATE 10 MG/1
10 TABLET ORAL DAILY
Qty: 90 TABLET | Refills: 3 | Status: SHIPPED | OUTPATIENT
Start: 2020-07-20 | End: 2021-07-15

## 2020-07-20 NOTE — TELEPHONE ENCOUNTER
----- Message from Siri Shrestha sent at 7/20/2020  2:15 PM CDT -----  Contact: [facility]/[phone]  Requesting an RX refill or new RX.  Is this a refill or new RX:  refill 1  RX name and strength: furosemide (LASIX) 40 MG tablet  Directions (copy/paste from chart):    Is this a 30 day or 90 day RX:    Local pharmacy or mail order pharmacy:  local pharmacy  Pharmacy name and phone # (copy/paste from chart):  [facility]/pharmacy #[id number] [place], [place] - [address] [phone] (Phone)  [phone] (Fax)   Comments:        Requesting an RX refill or new RX.  Is this a refill or new RX:  refill 2  RX name and strength: escitalopram oxalate (LEXAPRO) 10 MG tablet  Directions (copy/paste from chart):    Is this a 30 day or 90 day RX:    Local pharmacy or mail order pharmacy:  local pharmacy  Pharmacy name and phone # (copy/paste from chart):  [facility]/pharmacy #[id number] - [place] - [address] [phone] (Phone)  [phone] (Fax)   Comments:        Requesting an RX refill or new RX.  Is this a refill or new RX:  refill 3  RX name and strength: atorvastatin (LIPITOR) 80 MG tablet  Directions (copy/paste from chart):    Is this a 30 day or 90 day RX:    Local pharmacy or mail order pharmacy:  local pharmacy  Pharmacy name and phone # (copy/paste from chart):  [facility]/pharmacy #[id number] [place] - [address] [phone] (Phone)  [phone] (Fax)   Comments:          Requesting an RX refill or new RX.  Is this a refill or new RX:  refill 4  RX name and strength: clopidogrel (PLAVIX) 75 mg tablet  Directions (copy/paste from chart):    Is this a 30 day or 90 day RX:    Local pharmacy or mail order pharmacy:  local pharmacy  Pharmacy name and phone # (copy/paste from chart):  [facility] #[id number] - [address] AT [facility] [phone] (Phone)  [phone] (Fax)   Comments:        Requesting an RX refill or new  RX.  Is this a refill or new RX:  refill 5  RX name and strength: metoprolol tartrate (LOPRESSOR) 25 MG tablet  Directions (copy/paste from chart):    Is this a 30 day or 90 day RX:    Local pharmacy or mail order pharmacy:  local pharmacy  Pharmacy name and phone # (copy/paste from chart):  [facility]/pharmacy #[id number] - [place] - [address] [phone] (Phone)  [phone] (Fax)   Comments:        V=Requesting an RX refill or new RX.  Is this a refill or new RX:  refill 6  RX name and strength: dulaglutide (TRULICITY) 0.75 mg/0.5 mL PnIj  Directions (copy/paste from chart):    Is this a 30 day or 90 day RX:    Local pharmacy or mail order pharmacy:  local pharmacy  Pharmacy name and phone # (copy/paste from chart):  [facility]/pharmacy #[id number] - [place] - [address] [phone] (Phone)  [phone] (Fax)   Comments:

## 2020-08-05 RX ORDER — ESZOPICLONE 2 MG/1
2 TABLET, FILM COATED ORAL NIGHTLY
Qty: 30 TABLET | Refills: 0 | Status: SHIPPED | OUTPATIENT
Start: 2020-08-05 | End: 2020-09-08

## 2020-08-14 ENCOUNTER — OFFICE VISIT (OUTPATIENT)
Dept: INTERNAL MEDICINE | Facility: CLINIC | Age: 60
End: 2020-08-14
Payer: COMMERCIAL

## 2020-08-14 VITALS
DIASTOLIC BLOOD PRESSURE: 65 MMHG | WEIGHT: 161.81 LBS | SYSTOLIC BLOOD PRESSURE: 140 MMHG | OXYGEN SATURATION: 98 % | HEIGHT: 64 IN | HEART RATE: 78 BPM | BODY MASS INDEX: 27.63 KG/M2

## 2020-08-14 DIAGNOSIS — S80.822A BLISTER OF LEFT LOWER EXTREMITY, INITIAL ENCOUNTER: Primary | ICD-10-CM

## 2020-08-14 PROCEDURE — 99999 PR PBB SHADOW E&M-EST. PATIENT-LVL V: ICD-10-PCS | Mod: PBBFAC,,, | Performed by: STUDENT IN AN ORGANIZED HEALTH CARE EDUCATION/TRAINING PROGRAM

## 2020-08-14 PROCEDURE — 3077F SYST BP >= 140 MM HG: CPT | Mod: CPTII,S$GLB,, | Performed by: STUDENT IN AN ORGANIZED HEALTH CARE EDUCATION/TRAINING PROGRAM

## 2020-08-14 PROCEDURE — 99213 OFFICE O/P EST LOW 20 MIN: CPT | Mod: S$GLB,,, | Performed by: STUDENT IN AN ORGANIZED HEALTH CARE EDUCATION/TRAINING PROGRAM

## 2020-08-14 PROCEDURE — 3077F PR MOST RECENT SYSTOLIC BLOOD PRESSURE >= 140 MM HG: ICD-10-PCS | Mod: CPTII,S$GLB,, | Performed by: STUDENT IN AN ORGANIZED HEALTH CARE EDUCATION/TRAINING PROGRAM

## 2020-08-14 PROCEDURE — 3008F PR BODY MASS INDEX (BMI) DOCUMENTED: ICD-10-PCS | Mod: CPTII,S$GLB,, | Performed by: STUDENT IN AN ORGANIZED HEALTH CARE EDUCATION/TRAINING PROGRAM

## 2020-08-14 PROCEDURE — 3078F DIAST BP <80 MM HG: CPT | Mod: CPTII,S$GLB,, | Performed by: STUDENT IN AN ORGANIZED HEALTH CARE EDUCATION/TRAINING PROGRAM

## 2020-08-14 PROCEDURE — 99999 PR PBB SHADOW E&M-EST. PATIENT-LVL V: CPT | Mod: PBBFAC,,, | Performed by: STUDENT IN AN ORGANIZED HEALTH CARE EDUCATION/TRAINING PROGRAM

## 2020-08-14 PROCEDURE — 3078F PR MOST RECENT DIASTOLIC BLOOD PRESSURE < 80 MM HG: ICD-10-PCS | Mod: CPTII,S$GLB,, | Performed by: STUDENT IN AN ORGANIZED HEALTH CARE EDUCATION/TRAINING PROGRAM

## 2020-08-14 PROCEDURE — 3008F BODY MASS INDEX DOCD: CPT | Mod: CPTII,S$GLB,, | Performed by: STUDENT IN AN ORGANIZED HEALTH CARE EDUCATION/TRAINING PROGRAM

## 2020-08-14 PROCEDURE — 99213 PR OFFICE/OUTPT VISIT, EST, LEVL III, 20-29 MIN: ICD-10-PCS | Mod: S$GLB,,, | Performed by: STUDENT IN AN ORGANIZED HEALTH CARE EDUCATION/TRAINING PROGRAM

## 2020-08-14 NOTE — PATIENT INSTRUCTIONS
- Watch your blister for any signs of infections, like increase redness or swelling around your blister    - Go to the ED or let us know if you started heaving fever, chills, nausea or vomiting.    - Go to the sking doctor, Dermatologist to further look into your blister.

## 2020-08-14 NOTE — PROGRESS NOTES
Mariann Huff  1960        Subjective     Chief Complaint: blister in left leg.    History of Present Illness:  Ms. Mariann Huff is a 60 y.o. female with PMH of HTN, HFpEF, DM2 and HLD who came in to the clinic complaining of a blister in her left leg.    Patient is complaining of a left leg blister that started five days ago. States she had her first blister last week in her Rt armpit and lasted one day. Ruptured and drained its fluid then healed with a scar. She then developed her second blister in the left thigh with the same progression and duration. Her third blister was on the right thigh and lasted similarly for one day.    She then started her last and current blister on Monday, five day ago, and got bigger over time. Currently about cent size, with fluid inside. Has not ruptured yet. Associated with some redness around it and pain, 4/10. No fever, chills, N/V/D. No urinary symptoms. No mucosal involvment and no skin breakdown.    She admits having a rash in her inguainal area bilaterally. Reports started two day ago. 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 She tried putting the same cream from her last time yeserday.    No recent medications changes or new medicines aside from one for sleep she started ten days ago, eszopiclone. No joint pain. No trauma.         Review of Systems   Constitutional: Negative for chills, diaphoresis and fever.   HENT: Negative for congestion and sore throat.    Eyes: Negative for blurred vision and double vision.   Respiratory: Negative for cough, sputum production, shortness of breath and wheezing.    Cardiovascular: Negative for chest pain, palpitations, orthopnea, leg swelling and PND.   Gastrointestinal: Negative for abdominal pain, constipation, diarrhea, nausea and vomiting.   Genitourinary: Negative for dysuria, flank pain and hematuria.   Musculoskeletal: Negative for myalgias and neck pain.   Skin: Negative for itching.         Blister in Lt leg  Recent blisters in Lt armpit and bilateral thighs, resolved with scar formation   Neurological: Negative for dizziness, seizures, weakness and headaches.   Psychiatric/Behavioral: The patient is not nervous/anxious and does not have insomnia.         PAST HISTORY:     Past Medical History:   Diagnosis Date    Anticoagulant long-term use     Asthma     Back pain     Bradycardia, unspecified 5/8/2019    The etiology of the bradycardic episode is unclear.  The have appear to be respiratory in origin (at least the 1st episode).  We will continue to monitor carefully.  We are awaiting evaluation by Cardiology.     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, with long-term current use of insulin 1/9/2020       Past Surgical History:   Procedure Laterality Date    ANTEGRADE NEPHROSTOGRAPHY Left 12/11/2019    Procedure: Nephrostogram - antegrade;  Surgeon: Robin Boyd MD;  Location: [address];  Service: Urology;  Laterality: Left;    BRONCHOSCOPY N/A 5/2/2019    Procedure: BRONCHOSCOPY;  Surgeon: Sean Ruano MD;  Location: [address];  Service: General;  Laterality: N/A;    CARDIAC CATHETERIZATION      CATARACT EXTRACTION      cataract extraction left eye      cataracts      CAUDAL EPIDURAL STEROID INJECTION N/A 2/7/2019    Procedure: Injection-steroid-epidural-caudal;  Surgeon: Dave Bentley Jr., MD;  Location: [facility];  Service:  Pain Management;  Laterality: N/A;     SECTION, LOW TRANSVERSE      COLONOSCOPY N/A 2019    Procedure: COLONOSCOPY;  Surgeon: Al Alaniz MD;  [facility]  Service: Endoscopy;  Laterality: N/A;    CORONARY ANGIOPLASTY      CYSTOGRAM N/A 2019    Procedure: CYSTOGRAM INTRAOP;  Surgeon: Robin Boyd MD;  [facility];  Service: Urology;  Laterality: N/A;  1 HOUR    CYSTOSCOPY W/ RETROGRADES Left 2019    Procedure: CYSTOSCOPY, WITH RETROGRADE PYELOGRAM;  Surgeon: Robin Boyd MD;  Location: [facility] OR [address] [place] [address];  Service: Urology;  Laterality: Left;  fluro    ESOPHAGOGASTRODUODENOSCOPY W/ PEG  2019    Procedure: EGD, WITH PEG TUBE INSERTION;  Surgeon: Sean Ruano MD;  Location: [facility] OR [address] [place] [address];  Service: General;;    EXCISION TURBINATE, SUBMUCOUS      FLEXIBLE SIGMOIDOSCOPY N/A 2019    Procedure: SIGMOIDOSCOPY, FLEXIBLE;  Surgeon: ALBERTO Amin MD;  [facility]  Service: Endoscopy;  Laterality: N/A;    FLEXIBLE SIGMOIDOSCOPY N/A 2019    Procedure: SIGMOIDOSCOPY, FLEXIBLE;  Surgeon: ALBERTO Amin MD;  [facility]  Service: Endoscopy;  Laterality: N/A;    FUSION OF LUMBAR SPINE BY ANTERIOR APPROACH Left 2019    Procedure: FUSION, SPINE, LUMBAR, ANTERIOR APPROACH Left L5-S1 Anterior to Psoa Interbody Fusion, L5-S1 Posterior Instrumentation;  Surgeon: Mk George MD;  Location: [facility] OR [address] [place] [address];  Service: Neurosurgery;  Laterality: Left;  Porcedure:  Left L5-S1 Anterior to Psoa Interbody Fusion, L5-S1 Posterior Instrumentation  Surgery Time: 4 Hrs  LOS: 2-3  Anesthesia: General   Blood: Type & Screen  R    HAND SURGERY Left     HAND SURGERY Right     torn ligament repair/ Dr. Yeboah    HYSTERECTOMY      left foot surgery      left wrist surgery      LYSIS OF ADHESIONS N/A 2020    Procedure: LYSIS, ADHESIONS;  Surgeon: Robin Boyd MD;  Location: [facility] OR [address] [place] [address];   Service: Urology;  Laterality: N/A;    NASAL SEPTUM SURGERY  5/7/15    PERCUTANEOUS NEPHROSTOMY Left 4/21/2019    Procedure: Creation, Nephrostomy, Percutaneous;  Surgeon: Karina Surgeon;  Location: [facility];  Service: Anesthesiology;  Laterality: Left;    REPAIR OF URETER  4/12/2019    Procedure: REPAIR, URETER;  Surgeon: Mk George MD;  Location: [address];  Service: Neurosurgery;;    REPLACEMENT OF NEPHROSTOMY TUBE N/A 7/18/2019    Procedure: REPLACEMENT, NEPHROSTOMY TUBE;  Surgeon: [facility] Diagnostic Provider;  [facility];  Service: Anesthesiology;  Laterality: N/A;  188    REPLACEMENT OF NEPHROSTOMY TUBE N/A 7/24/2019    Procedure: REPLACEMENT, NEPHROSTOMY TUBE;  Surgeon: [facility] Diagnostic Provider;  [facility];  Service: Anesthesiology;  Laterality: N/A;  188    REPLACEMENT OF NEPHROSTOMY TUBE N/A 10/7/2019    Procedure: REPLACEMENT, NEPHROSTOMY TUBE;  Surgeon: [facility] Diagnostic Provider;  [address]  Service: Anesthesiology;  Laterality: N/A;  189    REPLACEMENT OF NEPHROSTOMY TUBE N/A 11/25/2019    Procedure: REPLACEMENT, NEPHROSTOMY TUBE;  Surgeon: [facility] Diagnostic Provider;  Location: [facility] OR [address] [place] [address];  Service: Anesthesiology;  Laterality: N/A;  Room 188/Bessy    REPLACEMENT OF NEPHROSTOMY TUBE Right 2/19/2020    Procedure: REPLACEMENT, NEPHROSTOMY TUBE removal removal;  Surgeon: Robin Boyd MD;  Location: [address];  Service: Urology;  Laterality: Right;    rt elbow surgery      S/P LAD COATED STENT  05/14/2010    6 total     S/P OM1 STENT  08/2003    SINUS SURGERY      F.E.S.S.    TRACHEOSTOMY N/A 5/2/2019    Procedure: CREATION, TRACHEOSTOMY;  Surgeon: Sean Ruano MD;  Location: [address];  Service: General;  Laterality: N/A;    TUBAL LIGATION      URETERAL REIMPLANTION Left 2/19/2020    Procedure: REIMPLANTATION, URETER WITH PSOAS HITCH;  Surgeon: Robin Boyd MD;  Location: [address];  Service: Urology;   Laterality: Left;       Family History   Problem Relation Age of Onset    Diabetes Mother     Heart disease Mother     Diabetes Father     Leukemia Father         leukemia    Heart attack Father     Diabetes Sister     Diabetes Brother     Diabetes Sister     No Known Problems Sister     No Known Problems Brother     No Known Problems Brother     No Known Problems Maternal Grandmother     No Known Problems Maternal Grandfather     No Known Problems Paternal Grandmother     No Known Problems Paternal Grandfather     No Known Problems Son     No Known Problems Son     No Known Problems Maternal Aunt     No Known Problems Maternal Uncle     No Known Problems Paternal Aunt     No Known Problems Paternal Uncle     Colon cancer Neg Hx     Inflammatory bowel disease Neg Hx     Melanoma Neg Hx     Psoriasis Neg Hx     Lupus Neg Hx     Eczema Neg Hx     Acne Neg Hx     Amblyopia Neg Hx     Blindness Neg Hx     Cancer Neg Hx     Cataracts Neg Hx     Glaucoma Neg Hx     Hypertension Neg Hx     Macular degeneration Neg Hx     Retinal detachment Neg Hx     Strabismus Neg Hx     Stroke Neg Hx     Thyroid disease Neg Hx     Heart failure Neg Hx     Hyperlipidemia Neg Hx        Social History     Socioeconomic History    Marital status:      Spouse name: Shamir    Number of children: 2    Years of education: Not on file    Highest education level: Not on file   Occupational History    Occupation: cafeteria     Employer: [employer]     Employer: [employer]     Employer: [employer]   Social Needs    Financial resource strain: Not on file    Food insecurity     Worry: Not on file     Inability: Not on file    Transportation needs     Medical: Not on file     Non-medical: Not on file   Tobacco Use    Smoking status: Never Smoker    Smokeless tobacco: Never Used   Substance and Sexual Activity    Alcohol use: No     Alcohol/week: 0.0  standard drinks    Drug use: No    Sexual activity: Yes     Partners: Male     Birth control/protection: Post-menopausal     Comment:    Lifestyle    Physical activity     Days per week: Not on file     Minutes per session: Not on file    Stress: Not on file   Relationships    Social connections     Talks on phone: Not on file     Gets together: Not on file     Attends Jehovah's witness service: Not on file     Active member of club or organization: Not on file     Attends meetings of clubs or organizations: Not on file     Relationship status: Not on file   Other Topics Concern    Are you pregnant or think you may be? No    Breast-feeding No   Social History Narrative    . 2 children.        MEDICATIONS & ALLERGIES:     Current Outpatient Medications on File Prior to Visit   Medication Sig    ACCU-CHEK EDIN PLUS METER Misc     albuterol (PROVENTIL/VENTOLIN HFA) 90 mcg/actuation inhaler Inhale 2 puffs into the lungs every 6 (six) hours as needed for Wheezing.    albuterol-ipratropium (DUO-NEB) 2.5 mg-0.5 mg/3 mL nebulizer solution Inhale 3 mLs into the lungs.    amLODIPine (NORVASC) 10 MG tablet TAKE 1 TABLET BY MOUTH EVERY DAY    aspirin 81 mg Tab Take 81 mg by mouth. 1 Tablet Oral Every day    atorvastatin (LIPITOR) 80 MG tablet 1 tablet (80 mg total) by Per G Tube route once daily.    blood sugar diagnostic (ACCU-CHEK EDIN PLUS TEST STRP) Strp TEST BLOOD SUGARS 4 TIMES DAILY    blood-glucose sensor (DEXCOM G6 SENSOR) Leann 3 each by Misc.(Non-Drug; Combo Route) route continuous prn.    blood-glucose transmitter (DEXCOM G6 TRANSMITTER) Leann 1 each by Misc.(Non-Drug; Combo Route) route continuous prn.    cefadroxil (DURICEF) 1 gram tablet TAKE 1 TABLET (1 G TOTAL) BY MOUTH 2 (TWO) TIMES DAILY.    clopidogrel (PLAVIX) 75 mg tablet Take 1 tablet (75 mg total) by mouth once daily.    dulaglutide (TRULICITY) 0.75 mg/0.5 mL PnIj Inject 0.5 mLs (0.75 mg total) into the skin every 7 days.     "escitalopram oxalate (LEXAPRO) 10 MG tablet Take 1 tablet (10 mg total) by mouth once daily.    eszopiclone (LUNESTA) 2 MG Tab TAKE 1 TABLET (2 MG TOTAL) BY MOUTH EVERY EVENING.    famotidine (PEPCID) 20 MG tablet Take 1 tablet (20 mg total) by mouth 2 (two) times daily.    furosemide (LASIX) 40 MG tablet Take 1 tablet (40 mg total) by mouth once daily.    gabapentin (NEURONTIN) 300 MG capsule Take 1 capsule (300 mg total) by mouth 2 (two) times daily.    insulin aspart U-100 (NOVOLOG FLEXPEN U-100 INSULIN) 100 unit/mL (3 mL) InPn pen Inject 6 Units into the skin 3 (three) times daily with meals.    insulin glargine U-300 conc (TOUJEO MAX U-300 SOLOSTAR) 300 unit/mL (3 mL) InPn Inject 18 Units into the skin every evening.    losartan (COZAAR) 100 MG tablet Take 1 tablet (100 mg total) by mouth once daily.    metoprolol tartrate (LOPRESSOR) 25 MG tablet Take 1 tablet (25 mg total) by mouth 2 (two) times daily.    multivitamin (THERAGRAN) per tablet Take 1 tablet by mouth once daily.    nitrofurantoin (MACRODANTIN) 100 MG capsule TAKE 1 CAPSULE BY MOUTH EVERY DAY IN THE EVENING    nitroGLYCERIN (NITROSTAT) 0.4 MG SL tablet Take one every 2-3 min till chestpain relief for 3 times and if still no relief, call MD or come to ED (Patient not taking: Reported on 6/29/2020)    ondansetron (ZOFRAN-ODT) 8 MG TbDL Take 1 tablet (8 mg total) by mouth every 12 (twelve) hours as needed. (Patient not taking: Reported on 6/29/2020)    oxyCODONE-acetaminophen (PERCOCET) 5-325 mg per tablet Take 1 tablet by mouth every 6 (six) hours as needed for Pain.    oxyCODONE-acetaminophen (PERCOCET) 5-325 mg per tablet Take 1 tablet by mouth every 8 (eight) hours as needed for Pain.    pen needle, diabetic (NOVOTWIST) 32 gauge x 1/5" Ndle Use 1 needle to inject insulin four times daily    polyethylene glycol (GLYCOLAX) 17 gram PwPk Mix 1 capful (17 g) in liquid and take by mouth once daily.    potassium chloride SA " "(K-DUR,KLOR-CON) 20 MEQ tablet Take 1 tablet (20 mEq total) by mouth 2 (two) times daily.    zolpidem (AMBIEN) 5 MG Tab Take 1 tablet (5 mg total) by mouth nightly as needed.     Current Facility-Administered Medications on File Prior to Visit   Medication    fluorescein 500 mg/5 mL (10 %) injection 500 mg    lidocaine (PF) 10 mg/ml (1%) injection 10 mg       Review of patient's allergies indicates:   Allergen Reactions    Milk containing products      Causes GI distress       OBJECTIVE:     Vital Signs:  Vitals:    08/14/20 0821   BP: (!) 140/65   BP Location: Right arm   Patient Position: Sitting   BP Method: Large (Manual)   Pulse: 78   SpO2: 98%   Weight: 73.4 kg (161 lb 13.1 oz)   Height: 5' 4" (1.626 m)       Body mass index is 27.78 kg/m².     Physical Exam:  Physical Exam   Constitutional: She is oriented to person, place, and time and well-developed, well-nourished, and in no distress. No distress.   HENT:   Head: Normocephalic and atraumatic.   Eyes: Pupils are equal, round, and reactive to light. Conjunctivae and EOM are normal. Right eye exhibits no discharge. Left eye exhibits no discharge. No scleral icterus.   Neck: Normal range of motion. Neck supple. No JVD present. No thyromegaly present.   Cardiovascular: Normal rate, regular rhythm, normal heart sounds and intact distal pulses.   No murmur heard.  Pulmonary/Chest: Effort normal and breath sounds normal. No respiratory distress. She has no wheezes. She has no rales.   Abdominal: Soft. Bowel sounds are normal. She exhibits no distension. There is no abdominal tenderness. There is no rebound.   Musculoskeletal: Normal range of motion.         General: No tenderness, deformity or edema.   Neurological: She is alert and oriented to person, place, and time. Gait normal. GCS score is 15.   Skin: Skin is warm and dry. No rash noted. She is not diaphoretic. There is erythema.   Negative Nikolsky sign  Blister, cent size, transparent with fluid level. " No bleeding or hematoma. No swelling around it.  Previous blister scars in Lt thigh, Rt thigh and Rt armpit  Images as below    Psychiatric: Mood, memory, affect and judgment normal.              Laboratory  Lab Results   Component Value Date    WBC 10.19 02/21/2020    HGB 9.4 (L) 02/21/2020    HCT 30.9 (L) 02/21/2020    MCV 90 02/21/2020     02/21/2020     Lab Results   Component Value Date     (H) 06/02/2020     06/02/2020    K 3.7 06/02/2020     06/02/2020    CO2 29 06/02/2020    BUN 17 06/02/2020    CREATININE 1.2 06/02/2020    CALCIUM 9.6 06/02/2020    MG 1.9 11/13/2019     Lab Results   Component Value Date    INR 0.9 11/25/2019    INR 1.0 10/07/2019    INR 1.0 07/24/2019     Lab Results   Component Value Date    HGBA1C 8.0 (H) 04/15/2020           Health Maintenance       Date Due Completion Date    Shingles Vaccine (1 of 2) 03/17/2011 ---    Mammogram 12/19/2019 12/19/2017    Influenza Vaccine (1) 09/01/2020 12/6/2019    Override on 10/9/2015: Done    Hemoglobin A1c 10/15/2020 4/15/2020    Foot Exam 01/06/2021 1/6/2020    Override on 2/15/2018: Done    Override on 8/31/2016: Done    Override on 2/24/2016: Done    Lipid Panel 02/26/2021 2/26/2020    Eye Exam 05/14/2021 5/14/2020    High Dose Statin 07/20/2021 7/20/2020    TETANUS VACCINE 04/17/2027 4/17/2017    Colorectal Cancer Screening 05/24/2029 6/6/2019          ASSESSMENT & PLAN:   Ms. Mariann Huff is a 60 y.o. female with PMH of HTN, HFpEF, DM2 and HLD who came in to the clinic complaining of a blister in her left leg.    Started developing blisters last week, each one lasted about one day per patient except the current one, lasted five days so far. Associated with minimal redness around it and pain. Fluid level inside, looks opaque. No systemic signs. No identifiable triggers as no identifiable medications, trauma, signs of infections at this time. No previous episode. No mucosal involvement.    Orlester was seen today  for blister.    Diagnoses and all orders for this visit:    Blister of left lower extremity, initial encounter  -     Ambulatory referral/consult to Dermatology; Future        -     Instructed to go to the ED or let us know if she started heaving fever, chills, nausea or vomiting. Signs of infection explained to her.         RTC in PRN for signs of infections    Man Farley MD  Internal Medicine PGY-3  [facility]  [address] [place] [address] [id number] [phone]

## 2020-08-19 ENCOUNTER — TELEPHONE (OUTPATIENT)
Dept: ENDOCRINOLOGY | Facility: CLINIC | Age: 60
End: 2020-08-19

## 2020-08-19 ENCOUNTER — OFFICE VISIT (OUTPATIENT)
Dept: INTERNAL MEDICINE | Facility: CLINIC | Age: 60
End: 2020-08-19
Payer: COMMERCIAL

## 2020-08-19 DIAGNOSIS — R55 SYNCOPE, UNSPECIFIED SYNCOPE TYPE: ICD-10-CM

## 2020-08-19 DIAGNOSIS — R21 RASH: Primary | ICD-10-CM

## 2020-08-19 PROCEDURE — 99212 PR OFFICE/OUTPT VISIT, EST, LEVL II, 10-19 MIN: ICD-10-PCS | Mod: 95,,, | Performed by: INTERNAL MEDICINE

## 2020-08-19 PROCEDURE — 99212 OFFICE O/P EST SF 10 MIN: CPT | Mod: 95,,, | Performed by: INTERNAL MEDICINE

## 2020-08-19 RX ORDER — KETOCONAZOLE 20 MG/G
CREAM TOPICAL 2 TIMES DAILY
Qty: 60 G | Refills: 0 | Status: SHIPPED | OUTPATIENT
Start: 2020-08-19 | End: 2020-09-08

## 2020-08-19 NOTE — PROGRESS NOTES
Established Patient - Audio Only Telehealth Visit     The patient location is: home  The chief complaint leading to consultation is: rash  Visit type: Virtual visit with audio only (telephone)  Total time spent with patient: 11 minuntes       The reason for the audio only service rather than synchronous audio and video virtual visit was related to technical difficulties or patient preference/necessity.     Each patient to whom I provide medical services by telemedicine is:  (1) informed of the relationship between the physician and patient and the respective role of any other health care provider with respect to management of the patient; and (2) notified that they may decline to receive medical services by telemedicine and may withdraw from such care at any time. Patient verbally consented to receive this service via voice-only telephone call.       HPI:  60-year-old female here for evaluation of a rash.  She reports that she has a rash in the crease of her legs (groin).  This has been going on the last week.  This is painful.  It is red.  It fades away into the rest of the skin. She is less active than usual.  She is cool, not overly hot.  She has the same rash under her breasts too.     She had a fainting spell last week when she got out the shower.  She was not out for long.  She has never had an episode like this before.  She was leaning against the wall.  She was brought to the bed and woke up.  She got out the shower and put her clothes on.     Assessment and plan:    Rash  - ketoconazole 2% cream b.i.d. prescribed.  Syncope, unspecified syncope type  -     Echo Color Flow Doppler? Yes; Future  -     Holter monitor - 24 hour; Future  -     CBC auto differential; Future  -     Comprehensive metabolic panel; Future  -     TSH; Future  -     Magnesium; Future    Other orders  -     ketoconazole (NIZORAL) 2 % cream; Apply topically 2 (two) times daily.                     This service was not originating from a  related E/M service provided within the previous 7 days nor will  to an E/M service or procedure within the next 24 hours or my soonest available appointment.  Prevailing standard of care was able to be met in this audio-only visit.

## 2020-08-19 NOTE — Clinical Note
She is having trouble get her trulicity covered.  Is there another med that would be better covered for her?    Jasbir Haney

## 2020-08-19 NOTE — TELEPHONE ENCOUNTER
Spoke with patient informed her of provider recommendations patient stated she would like the coupon mailed to address on file .

## 2020-08-19 NOTE — TELEPHONE ENCOUNTER
----- Message from Tatiana Randolph MD sent at 8/19/2020  8:42 AM CDT -----  Please let her know that Dr. Haney told me she was having trouble affording the Trulicity.  She can either look up a Trulicity savings card online or we can mail her one and see if that helps with cost.  If it is still too expensive she does not need to start that medication and we can talk about other options at her visit with Dr. Dawson next month  ----- Message -----  From: Jasbir Haney MD  Sent: 8/19/2020   8:05 AM CDT  To: Tatiana Randolph MD    She is having trouble get her trulicity covered.  Is there another med that would be better covered for her?    Jasbir Haney

## 2020-08-20 ENCOUNTER — LAB VISIT (OUTPATIENT)
Dept: LAB | Facility: HOSPITAL | Age: 60
End: 2020-08-20
Attending: INTERNAL MEDICINE
Payer: COMMERCIAL

## 2020-08-20 DIAGNOSIS — R55 SYNCOPE, UNSPECIFIED SYNCOPE TYPE: ICD-10-CM

## 2020-08-20 LAB
ALBUMIN SERPL BCP-MCNC: 3.8 G/DL (ref 3.5–5.2)
ALP SERPL-CCNC: 154 U/L (ref 55–135)
ALT SERPL W/O P-5'-P-CCNC: 12 U/L (ref 10–44)
ANION GAP SERPL CALC-SCNC: 11 MMOL/L (ref 8–16)
AST SERPL-CCNC: 13 U/L (ref 10–40)
BASOPHILS # BLD AUTO: 0.06 K/UL (ref 0–0.2)
BASOPHILS NFR BLD: 0.7 % (ref 0–1.9)
BILIRUB SERPL-MCNC: 0.9 MG/DL (ref 0.1–1)
BUN SERPL-MCNC: 16 MG/DL (ref 6–20)
CALCIUM SERPL-MCNC: 10 MG/DL (ref 8.7–10.5)
CHLORIDE SERPL-SCNC: 103 MMOL/L (ref 95–110)
CO2 SERPL-SCNC: 28 MMOL/L (ref 23–29)
CREAT SERPL-MCNC: 1.2 MG/DL (ref 0.5–1.4)
DIFFERENTIAL METHOD: ABNORMAL
EOSINOPHIL # BLD AUTO: 0.3 K/UL (ref 0–0.5)
EOSINOPHIL NFR BLD: 3.2 % (ref 0–8)
ERYTHROCYTE [DISTWIDTH] IN BLOOD BY AUTOMATED COUNT: 13.2 % (ref 11.5–14.5)
EST. GFR  (AFRICAN AMERICAN): 57.2 ML/MIN/1.73 M^2
EST. GFR  (NON AFRICAN AMERICAN): 49.6 ML/MIN/1.73 M^2
GLUCOSE SERPL-MCNC: 340 MG/DL (ref 70–110)
HCT VFR BLD AUTO: 38.7 % (ref 37–48.5)
HGB BLD-MCNC: 11.7 G/DL (ref 12–16)
IMM GRANULOCYTES # BLD AUTO: 0.03 K/UL (ref 0–0.04)
IMM GRANULOCYTES NFR BLD AUTO: 0.3 % (ref 0–0.5)
LYMPHOCYTES # BLD AUTO: 2.3 K/UL (ref 1–4.8)
LYMPHOCYTES NFR BLD: 25.8 % (ref 18–48)
MAGNESIUM SERPL-MCNC: 2 MG/DL (ref 1.6–2.6)
MCH RBC QN AUTO: 26.7 PG (ref 27–31)
MCHC RBC AUTO-ENTMCNC: 30.2 G/DL (ref 32–36)
MCV RBC AUTO: 88 FL (ref 82–98)
MONOCYTES # BLD AUTO: 0.8 K/UL (ref 0.3–1)
MONOCYTES NFR BLD: 9.3 % (ref 4–15)
NEUTROPHILS # BLD AUTO: 5.5 K/UL (ref 1.8–7.7)
NEUTROPHILS NFR BLD: 60.7 % (ref 38–73)
NRBC BLD-RTO: 0 /100 WBC
PLATELET # BLD AUTO: 244 K/UL (ref 150–350)
PMV BLD AUTO: 12.4 FL (ref 9.2–12.9)
POTASSIUM SERPL-SCNC: 4 MMOL/L (ref 3.5–5.1)
PROT SERPL-MCNC: 7.8 G/DL (ref 6–8.4)
RBC # BLD AUTO: 4.38 M/UL (ref 4–5.4)
SODIUM SERPL-SCNC: 142 MMOL/L (ref 136–145)
TSH SERPL DL<=0.005 MIU/L-ACNC: 1.68 UIU/ML (ref 0.4–4)
WBC # BLD AUTO: 9.07 K/UL (ref 3.9–12.7)

## 2020-08-20 PROCEDURE — 80053 COMPREHEN METABOLIC PANEL: CPT

## 2020-08-20 PROCEDURE — 84443 ASSAY THYROID STIM HORMONE: CPT

## 2020-08-20 PROCEDURE — 85025 COMPLETE CBC W/AUTO DIFF WBC: CPT

## 2020-08-20 PROCEDURE — 83735 ASSAY OF MAGNESIUM: CPT

## 2020-08-20 PROCEDURE — 36415 COLL VENOUS BLD VENIPUNCTURE: CPT | Mod: PO

## 2020-08-26 ENCOUNTER — OFFICE VISIT (OUTPATIENT)
Dept: INTERNAL MEDICINE | Facility: CLINIC | Age: 60
End: 2020-08-26
Payer: COMMERCIAL

## 2020-08-26 VITALS
RESPIRATION RATE: 16 BRPM | DIASTOLIC BLOOD PRESSURE: 80 MMHG | WEIGHT: 162.69 LBS | BODY MASS INDEX: 27.77 KG/M2 | SYSTOLIC BLOOD PRESSURE: 120 MMHG | HEART RATE: 76 BPM | TEMPERATURE: 97 F | HEIGHT: 64 IN | OXYGEN SATURATION: 98 %

## 2020-08-26 DIAGNOSIS — R21 RASH: Primary | ICD-10-CM

## 2020-08-26 PROCEDURE — 99213 OFFICE O/P EST LOW 20 MIN: CPT | Mod: S$GLB,,, | Performed by: INTERNAL MEDICINE

## 2020-08-26 PROCEDURE — 3074F SYST BP LT 130 MM HG: CPT | Mod: CPTII,S$GLB,, | Performed by: INTERNAL MEDICINE

## 2020-08-26 PROCEDURE — 99213 PR OFFICE/OUTPT VISIT, EST, LEVL III, 20-29 MIN: ICD-10-PCS | Mod: S$GLB,,, | Performed by: INTERNAL MEDICINE

## 2020-08-26 PROCEDURE — 3008F BODY MASS INDEX DOCD: CPT | Mod: CPTII,S$GLB,, | Performed by: INTERNAL MEDICINE

## 2020-08-26 PROCEDURE — 99999 PR PBB SHADOW E&M-EST. PATIENT-LVL V: CPT | Mod: PBBFAC,,, | Performed by: INTERNAL MEDICINE

## 2020-08-26 PROCEDURE — 3079F DIAST BP 80-89 MM HG: CPT | Mod: CPTII,S$GLB,, | Performed by: INTERNAL MEDICINE

## 2020-08-26 PROCEDURE — 3008F PR BODY MASS INDEX (BMI) DOCUMENTED: ICD-10-PCS | Mod: CPTII,S$GLB,, | Performed by: INTERNAL MEDICINE

## 2020-08-26 PROCEDURE — 3074F PR MOST RECENT SYSTOLIC BLOOD PRESSURE < 130 MM HG: ICD-10-PCS | Mod: CPTII,S$GLB,, | Performed by: INTERNAL MEDICINE

## 2020-08-26 PROCEDURE — 3079F PR MOST RECENT DIASTOLIC BLOOD PRESSURE 80-89 MM HG: ICD-10-PCS | Mod: CPTII,S$GLB,, | Performed by: INTERNAL MEDICINE

## 2020-08-26 PROCEDURE — 99999 PR PBB SHADOW E&M-EST. PATIENT-LVL V: ICD-10-PCS | Mod: PBBFAC,,, | Performed by: INTERNAL MEDICINE

## 2020-08-26 RX ORDER — BETAMETHASONE DIPROPIONATE 0.5 MG/G
CREAM TOPICAL 2 TIMES DAILY
Qty: 45 G | Refills: 0 | Status: SHIPPED | OUTPATIENT
Start: 2020-08-26 | End: 2020-09-08

## 2020-08-26 RX ORDER — NYSTATIN 100000 [USP'U]/G
POWDER TOPICAL 2 TIMES DAILY
Qty: 60 G | Refills: 0 | Status: SHIPPED | OUTPATIENT
Start: 2020-08-26 | End: 2020-09-14

## 2020-08-26 NOTE — PROGRESS NOTES
Subjective:       Patient ID: Mariann Huff is a 60 y.o. female.    Chief Complaint: Rash (neck, under breast and crease of both legs)    HPI     60-year-old female here for evaluation of a rash.  The rash is not improving.  It has been going on about two weeks at this point.  It has not improved with the ketoconazole ointment.  She was using gain and switched to tide.  She has this under her breasts and in the crease of her legs.  It appeared on her neck after she took her necklace off on Monday.  She put some cream on this one.  She wears this necklace all the time.    Review of Systems      Objective:      Physical Exam  Vitals signs reviewed.   Constitutional:       Appearance: She is well-developed.   HENT:      Head: Normocephalic and atraumatic.      Mouth/Throat:      Pharynx: No oropharyngeal exudate.   Eyes:      General: No scleral icterus.        Right eye: No discharge.         Left eye: No discharge.      Pupils: Pupils are equal, round, and reactive to light.   Neck:      Musculoskeletal: Normal range of motion and neck supple.      Thyroid: No thyromegaly.      Trachea: No tracheal deviation.   Cardiovascular:      Rate and Rhythm: Normal rate and regular rhythm.      Heart sounds: Normal heart sounds. No murmur. No friction rub. No gallop.    Pulmonary:      Effort: Pulmonary effort is normal. No respiratory distress.      Breath sounds: Normal breath sounds. No wheezing or rales.   Chest:      Chest wall: No tenderness.   Abdominal:      General: Bowel sounds are normal. There is no distension.      Palpations: Abdomen is soft. There is no mass.      Tenderness: There is no abdominal tenderness. There is no guarding or rebound.   Musculoskeletal: Normal range of motion.         General: No tenderness.   Skin:     General: Skin is warm and dry.      Coloration: Skin is not pale.      Findings: Rash present. No erythema.   Neurological:      Mental Status: She is alert and oriented to person,  place, and time.   Psychiatric:         Behavior: Behavior normal.         Assessment:       1. Rash        Plan:       1.  Nystatin powder prescribed to use twice daily.  If no improvement, patient to let me know.  Rash on neck is not fungal, think this is contact dermatitis.  Betamethasone cream prescribed.

## 2020-09-03 ENCOUNTER — TELEPHONE (OUTPATIENT)
Dept: CARDIOLOGY | Facility: CLINIC | Age: 60
End: 2020-09-03

## 2020-09-03 ENCOUNTER — CLINICAL SUPPORT (OUTPATIENT)
Dept: CARDIOLOGY | Facility: CLINIC | Age: 60
End: 2020-09-03
Attending: INTERNAL MEDICINE
Payer: COMMERCIAL

## 2020-09-03 ENCOUNTER — LAB VISIT (OUTPATIENT)
Dept: LAB | Facility: HOSPITAL | Age: 60
End: 2020-09-03
Attending: INTERNAL MEDICINE
Payer: COMMERCIAL

## 2020-09-03 VITALS
SYSTOLIC BLOOD PRESSURE: 128 MMHG | BODY MASS INDEX: 27.49 KG/M2 | WEIGHT: 161 LBS | HEIGHT: 64 IN | HEART RATE: 76 BPM | DIASTOLIC BLOOD PRESSURE: 76 MMHG

## 2020-09-03 DIAGNOSIS — I15.2 HYPERTENSION ASSOCIATED WITH DIABETES: ICD-10-CM

## 2020-09-03 DIAGNOSIS — R55 SYNCOPE, UNSPECIFIED SYNCOPE TYPE: ICD-10-CM

## 2020-09-03 DIAGNOSIS — E78.5 HYPERLIPIDEMIA ASSOCIATED WITH TYPE 2 DIABETES MELLITUS: ICD-10-CM

## 2020-09-03 DIAGNOSIS — E11.51 TYPE 2 DIABETES MELLITUS WITH DIABETIC PERIPHERAL ANGIOPATHY WITHOUT GANGRENE, WITHOUT LONG-TERM CURRENT USE OF INSULIN: ICD-10-CM

## 2020-09-03 DIAGNOSIS — E11.59 HYPERTENSION ASSOCIATED WITH DIABETES: ICD-10-CM

## 2020-09-03 DIAGNOSIS — E11.69 HYPERLIPIDEMIA ASSOCIATED WITH TYPE 2 DIABETES MELLITUS: ICD-10-CM

## 2020-09-03 LAB
ALBUMIN SERPL BCP-MCNC: 3.9 G/DL (ref 3.5–5.2)
ALP SERPL-CCNC: 175 U/L (ref 55–135)
ALT SERPL W/O P-5'-P-CCNC: 20 U/L (ref 10–44)
ANION GAP SERPL CALC-SCNC: 12 MMOL/L (ref 8–16)
ASCENDING AORTA: 2.9 CM
AST SERPL-CCNC: 17 U/L (ref 10–40)
AV INDEX (PROSTH): 0.76
AV MEAN GRADIENT: 4 MMHG
AV PEAK GRADIENT: 6 MMHG
AV VALVE AREA: 2.57 CM2
AV VELOCITY RATIO: 0.69
BILIRUB SERPL-MCNC: 0.9 MG/DL (ref 0.1–1)
BSA FOR ECHO PROCEDURE: 1.82 M2
BUN SERPL-MCNC: 24 MG/DL (ref 6–20)
CALCIUM SERPL-MCNC: 9.9 MG/DL (ref 8.7–10.5)
CHLORIDE SERPL-SCNC: 102 MMOL/L (ref 95–110)
CHOLEST SERPL-MCNC: 217 MG/DL (ref 120–199)
CHOLEST/HDLC SERPL: 4.3 {RATIO} (ref 2–5)
CO2 SERPL-SCNC: 28 MMOL/L (ref 23–29)
CREAT SERPL-MCNC: 1.2 MG/DL (ref 0.5–1.4)
CV ECHO LV RWT: 0.39 CM
DOP CALC AO PEAK VEL: 1.27 M/S
DOP CALC AO VTI: 26.5 CM
DOP CALC LVOT AREA: 3.4 CM2
DOP CALC LVOT DIAMETER: 2.07 CM
DOP CALC LVOT PEAK VEL: 0.88 M/S
DOP CALC LVOT STROKE VOLUME: 67.98 CM3
DOP CALCLVOT PEAK VEL VTI: 20.21 CM
E WAVE DECELERATION TIME: 238.88 MSEC
E/A RATIO: 0.93
E/E' RATIO: 12 M/S
ECHO LV POSTERIOR WALL: 0.81 CM (ref 0.6–1.1)
EST. GFR  (AFRICAN AMERICAN): 57.2 ML/MIN/1.73 M^2
EST. GFR  (NON AFRICAN AMERICAN): 49.6 ML/MIN/1.73 M^2
ESTIMATED AVG GLUCOSE: 301 MG/DL (ref 68–131)
FRACTIONAL SHORTENING: 40 % (ref 28–44)
GLUCOSE SERPL-MCNC: 316 MG/DL (ref 70–110)
HBA1C MFR BLD HPLC: 12.1 % (ref 4–5.6)
HDLC SERPL-MCNC: 51 MG/DL (ref 40–75)
HDLC SERPL: 23.5 % (ref 20–50)
INTERVENTRICULAR SEPTUM: 0.97 CM (ref 0.6–1.1)
IVRT: 114.18 MSEC
LA MAJOR: 4.14 CM
LA MINOR: 3.98 CM
LA WIDTH: 3.56 CM
LDLC SERPL CALC-MCNC: 127.2 MG/DL (ref 63–159)
LEFT ATRIUM SIZE: 3.39 CM
LEFT ATRIUM VOLUME INDEX MOD: 20.2 ML/M2
LEFT ATRIUM VOLUME INDEX: 23.3 ML/M2
LEFT ATRIUM VOLUME MOD: 36 CM3
LEFT ATRIUM VOLUME: 41.63 CM3
LEFT INTERNAL DIMENSION IN SYSTOLE: 2.49 CM (ref 2.1–4)
LEFT VENTRICLE DIASTOLIC VOLUME INDEX: 42.84 ML/M2
LEFT VENTRICLE DIASTOLIC VOLUME: 76.42 ML
LEFT VENTRICLE MASS INDEX: 64 G/M2
LEFT VENTRICLE SYSTOLIC VOLUME INDEX: 12.4 ML/M2
LEFT VENTRICLE SYSTOLIC VOLUME: 22.03 ML
LEFT VENTRICULAR INTERNAL DIMENSION IN DIASTOLE: 4.15 CM (ref 3.5–6)
LEFT VENTRICULAR MASS: 114.63 G
LV LATERAL E/E' RATIO: 9.43 M/S
LV SEPTAL E/E' RATIO: 16.5 M/S
MV PEAK A VEL: 0.71 M/S
MV PEAK E VEL: 0.66 M/S
MV STENOSIS PRESSURE HALF TIME: 69.27 MS
MV VALVE AREA P 1/2 METHOD: 3.18 CM2
NONHDLC SERPL-MCNC: 166 MG/DL
PISA TR MAX VEL: 1.87 M/S
POTASSIUM SERPL-SCNC: 4.3 MMOL/L (ref 3.5–5.1)
PROT SERPL-MCNC: 8.2 G/DL (ref 6–8.4)
PULM VEIN S/D RATIO: 1.03
PV PEAK D VEL: 0.36 M/S
PV PEAK S VEL: 0.37 M/S
RA MAJOR: 4.06 CM
RA PRESSURE: 3 MMHG
RA WIDTH: 2.77 CM
RIGHT VENTRICULAR END-DIASTOLIC DIMENSION: 2.34 CM
SINUS: 2.67 CM
SODIUM SERPL-SCNC: 142 MMOL/L (ref 136–145)
STJ: 2.29 CM
TDI LATERAL: 0.07 M/S
TDI SEPTAL: 0.04 M/S
TDI: 0.06 M/S
TR MAX PG: 14 MMHG
TRICUSPID ANNULAR PLANE SYSTOLIC EXCURSION: 1.44 CM
TRIGL SERPL-MCNC: 194 MG/DL (ref 30–150)
TSH SERPL DL<=0.005 MIU/L-ACNC: 2.69 UIU/ML (ref 0.4–4)
TV REST PULMONARY ARTERY PRESSURE: 17 MMHG

## 2020-09-03 PROCEDURE — 99999 PR PBB SHADOW E&M-EST. PATIENT-LVL II: ICD-10-PCS | Mod: PBBFAC,,,

## 2020-09-03 PROCEDURE — 80053 COMPREHEN METABOLIC PANEL: CPT

## 2020-09-03 PROCEDURE — 99999 PR PBB SHADOW E&M-EST. PATIENT-LVL II: CPT | Mod: PBBFAC,,,

## 2020-09-03 PROCEDURE — 36415 COLL VENOUS BLD VENIPUNCTURE: CPT | Mod: PO

## 2020-09-03 PROCEDURE — 93306 TTE W/DOPPLER COMPLETE: CPT | Mod: S$GLB,,, | Performed by: INTERNAL MEDICINE

## 2020-09-03 PROCEDURE — 80061 LIPID PANEL: CPT

## 2020-09-03 PROCEDURE — 83036 HEMOGLOBIN GLYCOSYLATED A1C: CPT

## 2020-09-03 PROCEDURE — 93224 HOLTER MONITOR - 24 HOUR (CUPID ONLY): ICD-10-PCS | Mod: S$GLB,,, | Performed by: INTERNAL MEDICINE

## 2020-09-03 PROCEDURE — 93224 XTRNL ECG REC UP TO 48 HRS: CPT | Mod: S$GLB,,, | Performed by: INTERNAL MEDICINE

## 2020-09-03 PROCEDURE — 84443 ASSAY THYROID STIM HORMONE: CPT

## 2020-09-03 PROCEDURE — 93306 ECHO (CUPID ONLY): ICD-10-PCS | Mod: S$GLB,,, | Performed by: INTERNAL MEDICINE

## 2020-09-03 NOTE — TELEPHONE ENCOUNTER
----- Message from Adelaide Aguilar MD sent at 9/3/2020  1:40 PM CDT -----  Please inform the patient that Diabetes is worse again and lipids are worse as well.

## 2020-09-08 RX ORDER — KETOCONAZOLE 20 MG/G
CREAM TOPICAL
Qty: 60 G | Refills: 0 | Status: SHIPPED | OUTPATIENT
Start: 2020-09-08 | End: 2020-09-14

## 2020-09-08 RX ORDER — CLOPIDOGREL BISULFATE 75 MG/1
TABLET ORAL
Qty: 90 TABLET | Refills: 3 | Status: SHIPPED | OUTPATIENT
Start: 2020-09-08 | End: 2021-07-15

## 2020-09-08 RX ORDER — BETAMETHASONE DIPROPIONATE 0.5 MG/G
CREAM TOPICAL
Qty: 45 G | Refills: 0 | Status: SHIPPED | OUTPATIENT
Start: 2020-09-08 | End: 2020-09-14

## 2020-09-08 RX ORDER — ESZOPICLONE 2 MG/1
2 TABLET, FILM COATED ORAL NIGHTLY
Qty: 30 TABLET | Refills: 0 | Status: SHIPPED | OUTPATIENT
Start: 2020-09-08 | End: 2020-10-23

## 2020-09-09 ENCOUNTER — TELEPHONE (OUTPATIENT)
Dept: INTERNAL MEDICINE | Facility: CLINIC | Age: 60
End: 2020-09-09

## 2020-09-09 DIAGNOSIS — I49.3 FREQUENT PVCS: Primary | ICD-10-CM

## 2020-09-09 LAB
OHS CV EVENT MONITOR DAY: 0
OHS CV HOLTER LENGTH DECIMAL HOURS: 24
OHS CV HOLTER LENGTH HOURS: 24
OHS CV HOLTER LENGTH MINUTES: 0

## 2020-09-09 NOTE — TELEPHONE ENCOUNTER
Please let patient know that she has frequent premature ventricular contractions.  I am going to have her see Cardiology because of this.

## 2020-09-10 ENCOUNTER — PATIENT OUTREACH (OUTPATIENT)
Dept: ADMINISTRATIVE | Facility: OTHER | Age: 60
End: 2020-09-10

## 2020-09-10 NOTE — PROGRESS NOTES
Care Everywhere: updated  Immunization: updated  Health Maintenance: updated  Media Review: review for outside mammogram report   Legacy Review:   Order placed:   Upcoming appts:

## 2020-09-14 ENCOUNTER — OFFICE VISIT (OUTPATIENT)
Dept: CARDIOLOGY | Facility: CLINIC | Age: 60
End: 2020-09-14
Payer: COMMERCIAL

## 2020-09-14 VITALS
WEIGHT: 159.06 LBS | DIASTOLIC BLOOD PRESSURE: 84 MMHG | HEART RATE: 72 BPM | HEIGHT: 64 IN | SYSTOLIC BLOOD PRESSURE: 191 MMHG | BODY MASS INDEX: 27.16 KG/M2

## 2020-09-14 DIAGNOSIS — I50.42 CHRONIC COMBINED SYSTOLIC AND DIASTOLIC CHF (CONGESTIVE HEART FAILURE): Chronic | ICD-10-CM

## 2020-09-14 DIAGNOSIS — K76.0 FATTY LIVER DISEASE, NONALCOHOLIC: ICD-10-CM

## 2020-09-14 DIAGNOSIS — I12.9 TYPE 2 DIABETES MELLITUS WITH STAGE 2 CHRONIC KIDNEY DISEASE AND HYPERTENSION: Chronic | ICD-10-CM

## 2020-09-14 DIAGNOSIS — R00.1 BRADYCARDIA: ICD-10-CM

## 2020-09-14 DIAGNOSIS — G47.30 SLEEP APNEA, UNSPECIFIED TYPE: Chronic | ICD-10-CM

## 2020-09-14 DIAGNOSIS — I15.2 HYPERTENSION ASSOCIATED WITH DIABETES: Chronic | ICD-10-CM

## 2020-09-14 DIAGNOSIS — I65.23 CAROTID STENOSIS, BILATERAL: Chronic | ICD-10-CM

## 2020-09-14 DIAGNOSIS — E11.69 HYPERLIPIDEMIA ASSOCIATED WITH TYPE 2 DIABETES MELLITUS: Chronic | ICD-10-CM

## 2020-09-14 DIAGNOSIS — E11.59 HYPERTENSION ASSOCIATED WITH DIABETES: Chronic | ICD-10-CM

## 2020-09-14 DIAGNOSIS — E11.22 TYPE 2 DIABETES MELLITUS WITH STAGE 2 CHRONIC KIDNEY DISEASE AND HYPERTENSION: Chronic | ICD-10-CM

## 2020-09-14 DIAGNOSIS — Z95.5 S/P CORONARY ARTERY STENT PLACEMENT: ICD-10-CM

## 2020-09-14 DIAGNOSIS — I25.10 CORONARY ARTERY DISEASE INVOLVING NATIVE CORONARY ARTERY OF NATIVE HEART WITHOUT ANGINA PECTORIS: Primary | Chronic | ICD-10-CM

## 2020-09-14 DIAGNOSIS — I25.2 HISTORY OF NON-ST ELEVATION MYOCARDIAL INFARCTION (NSTEMI): Chronic | ICD-10-CM

## 2020-09-14 DIAGNOSIS — E78.5 HYPERLIPIDEMIA ASSOCIATED WITH TYPE 2 DIABETES MELLITUS: Chronic | ICD-10-CM

## 2020-09-14 DIAGNOSIS — N18.2 TYPE 2 DIABETES MELLITUS WITH STAGE 2 CHRONIC KIDNEY DISEASE AND HYPERTENSION: Chronic | ICD-10-CM

## 2020-09-14 PROCEDURE — 3077F SYST BP >= 140 MM HG: CPT | Mod: CPTII,S$GLB,, | Performed by: INTERNAL MEDICINE

## 2020-09-14 PROCEDURE — 3008F PR BODY MASS INDEX (BMI) DOCUMENTED: ICD-10-PCS | Mod: CPTII,S$GLB,, | Performed by: INTERNAL MEDICINE

## 2020-09-14 PROCEDURE — 99214 OFFICE O/P EST MOD 30 MIN: CPT | Mod: S$GLB,,, | Performed by: INTERNAL MEDICINE

## 2020-09-14 PROCEDURE — 3079F PR MOST RECENT DIASTOLIC BLOOD PRESSURE 80-89 MM HG: ICD-10-PCS | Mod: CPTII,S$GLB,, | Performed by: INTERNAL MEDICINE

## 2020-09-14 PROCEDURE — 3079F DIAST BP 80-89 MM HG: CPT | Mod: CPTII,S$GLB,, | Performed by: INTERNAL MEDICINE

## 2020-09-14 PROCEDURE — 99214 PR OFFICE/OUTPT VISIT, EST, LEVL IV, 30-39 MIN: ICD-10-PCS | Mod: S$GLB,,, | Performed by: INTERNAL MEDICINE

## 2020-09-14 PROCEDURE — 3008F BODY MASS INDEX DOCD: CPT | Mod: CPTII,S$GLB,, | Performed by: INTERNAL MEDICINE

## 2020-09-14 PROCEDURE — 99999 PR PBB SHADOW E&M-EST. PATIENT-LVL V: ICD-10-PCS | Mod: PBBFAC,,, | Performed by: INTERNAL MEDICINE

## 2020-09-14 PROCEDURE — 99999 PR PBB SHADOW E&M-EST. PATIENT-LVL V: CPT | Mod: PBBFAC,,, | Performed by: INTERNAL MEDICINE

## 2020-09-14 PROCEDURE — 3046F PR MOST RECENT HEMOGLOBIN A1C LEVEL > 9.0%: ICD-10-PCS | Mod: CPTII,S$GLB,, | Performed by: INTERNAL MEDICINE

## 2020-09-14 PROCEDURE — 3077F PR MOST RECENT SYSTOLIC BLOOD PRESSURE >= 140 MM HG: ICD-10-PCS | Mod: CPTII,S$GLB,, | Performed by: INTERNAL MEDICINE

## 2020-09-14 PROCEDURE — 3046F HEMOGLOBIN A1C LEVEL >9.0%: CPT | Mod: CPTII,S$GLB,, | Performed by: INTERNAL MEDICINE

## 2020-09-14 RX ORDER — ISOSORBIDE MONONITRATE 10 MG/1
10 TABLET ORAL DAILY
Qty: 30 TABLET | Refills: 6 | Status: SHIPPED | OUTPATIENT
Start: 2020-09-14 | End: 2020-12-31

## 2020-09-14 RX ORDER — ISOSORBIDE MONONITRATE 10 MG/1
10 TABLET ORAL DAILY
COMMUNITY
End: 2020-09-14 | Stop reason: SDUPTHER

## 2020-09-14 NOTE — PROGRESS NOTES
Subjective:   Patient ID:  Mariann Huff is a 60 y.o. female who presents for follow-up of Hypertension      HPI: For a while patient was on Trulicity and Diabetes was better controlled.  IT is not controlled again. Patient has a follow up appointment with endocrinology scheduled.    She states she is complaint with medications, Today BP is not at goal.  She took her meds this morning, but last night had a heavy supper with pork chops etc.    She denies any symptoms right now.    Lab Results   Component Value Date     09/03/2020    K 4.3 09/03/2020     09/03/2020    CO2 28 09/03/2020    BUN 24 (H) 09/03/2020    CREATININE 1.2 09/03/2020     (H) 09/03/2020    HGBA1C 12.1 (H) 09/03/2020    MG 2.0 08/20/2020    AST 17 09/03/2020    ALT 20 09/03/2020    ALBUMIN 3.9 09/03/2020    PROT 8.2 09/03/2020    BILITOT 0.9 09/03/2020    WBC 9.07 08/20/2020    HGB 11.7 (L) 08/20/2020    HCT 38.7 08/20/2020    HCT 19 (LL) 05/19/2019    MCV 88 08/20/2020     08/20/2020    INR 0.9 11/25/2019    TSH 2.689 09/03/2020    CHOL 217 (H) 09/03/2020    HDL 51 09/03/2020    LDLCALC 127.2 09/03/2020    TRIG 194 (H) 09/03/2020       Review of Systems   Constitution: Positive for weight gain.   HENT: Negative.    Eyes: Negative.    Cardiovascular: Negative.  Negative for chest pain, claudication, dyspnea on exertion, irregular heartbeat, leg swelling, near-syncope, palpitations and syncope.   Respiratory: Negative.  Negative for cough, shortness of breath, snoring and wheezing.    Endocrine: Negative.  Negative for cold intolerance, heat intolerance, polydipsia, polyphagia and polyuria.   Skin: Negative.    Musculoskeletal: Positive for back pain and muscle cramps.   Gastrointestinal: Negative.    Genitourinary: Negative.    Neurological: Positive for dizziness, numbness and weakness.   Psychiatric/Behavioral: Negative.        Objective:   Physical Exam   Constitutional: She is oriented to person, place, and time.  "She appears well-developed and well-nourished.   BP (!) 191/84   Pulse 72   Ht 5' 4" (1.626 m)   Wt 72.1 kg (159 lb 1 oz)   LMP  (LMP Unknown)   BMI 27.30 kg/m²      HENT:   Head: Normocephalic.   Eyes: Pupils are equal, round, and reactive to light.   Neck: Normal range of motion. Neck supple. Carotid bruit is not present. No thyromegaly present.   Cardiovascular: Normal rate, regular rhythm, normal heart sounds and intact distal pulses. Exam reveals no gallop and no friction rub.   No murmur heard.  Pulses:       Carotid pulses are 2+ on the right side and 2+ on the left side.       Radial pulses are 2+ on the right side and 2+ on the left side.        Femoral pulses are 2+ on the right side and 2+ on the left side.       Popliteal pulses are 2+ on the right side and 2+ on the left side.        Dorsalis pedis pulses are 2+ on the right side and 2+ on the left side.        Posterior tibial pulses are 2+ on the right side and 2+ on the left side.   Pulmonary/Chest: Effort normal and breath sounds normal. No respiratory distress. She has no wheezes. She has no rales. She exhibits no tenderness.   Abdominal: Soft. Bowel sounds are normal.   obese   Musculoskeletal: Normal range of motion.         General: No edema.   Neurological: She is alert and oriented to person, place, and time.   Skin: Skin is warm and dry.   Psychiatric: She has a normal mood and affect.   Nursing note and vitals reviewed.        Assessment and Plan:     Problem List Items Addressed This Visit        Cardiology Problems    Hypertension associated with diabetes (Chronic)    CAD (coronary artery disease) - Primary (Chronic)    Carotid stenosis, bilateral (Chronic)    Type 2 diabetes mellitus with stage 2 chronic kidney disease and hypertension (Chronic)    Chronic combined systolic and diastolic CHF (congestive heart failure) (Chronic)       Other    Hyperlipidemia associated with type 2 diabetes mellitus (Chronic)    Sleep apnea (Chronic) "    History of non-ST elevation myocardial infarction (NSTEMI) (Chronic)    S/P coronary artery stent placement    Fatty liver disease, nonalcoholic    Bradycardia          Patient's Medications   New Prescriptions    No medications on file   Previous Medications    ACCU-CHEK EDIN PLUS METER MISC        ALBUTEROL (PROVENTIL/VENTOLIN HFA) 90 MCG/ACTUATION INHALER    Inhale 2 puffs into the lungs every 6 (six) hours as needed for Wheezing.    ALBUTEROL-IPRATROPIUM (DUO-NEB) 2.5 MG-0.5 MG/3 ML NEBULIZER SOLUTION    Inhale 3 mLs into the lungs.    AMLODIPINE (NORVASC) 10 MG TABLET    TAKE 1 TABLET BY MOUTH EVERY DAY    ASPIRIN 81 MG TAB    Take 81 mg by mouth. 1 Tablet Oral Every day    ATORVASTATIN (LIPITOR) 80 MG TABLET    1 tablet (80 mg total) by Per G Tube route once daily.    BLOOD SUGAR DIAGNOSTIC (ACCU-CHEK EDIN PLUS TEST STRP) STRP    TEST BLOOD SUGARS 4 TIMES DAILY    BLOOD-GLUCOSE SENSOR (DEXCOM G6 SENSOR) MICHAEL    3 each by Misc.(Non-Drug; Combo Route) route continuous prn.    BLOOD-GLUCOSE TRANSMITTER (DEXCOM G6 TRANSMITTER) MICHAEL    1 each by Misc.(Non-Drug; Combo Route) route continuous prn.    CLOPIDOGREL (PLAVIX) 75 MG TABLET    TAKE 1 TABLET BY MOUTH EVERY DAY    ESCITALOPRAM OXALATE (LEXAPRO) 10 MG TABLET    Take 1 tablet (10 mg total) by mouth once daily.    ESZOPICLONE (LUNESTA) 2 MG TAB    TAKE 1 TABLET (2 MG TOTAL) BY MOUTH EVERY EVENING.    FAMOTIDINE (PEPCID) 20 MG TABLET    Take 1 tablet (20 mg total) by mouth 2 (two) times daily.    FUROSEMIDE (LASIX) 40 MG TABLET    Take 1 tablet (40 mg total) by mouth once daily.    GABAPENTIN (NEURONTIN) 300 MG CAPSULE    Take 1 capsule (300 mg total) by mouth 2 (two) times daily.    INSULIN ASPART U-100 (NOVOLOG FLEXPEN U-100 INSULIN) 100 UNIT/ML (3 ML) INPN PEN    Inject 6 Units into the skin 3 (three) times daily with meals.    INSULIN GLARGINE U-300 CONC (TOUJEO MAX U-300 SOLOSTAR) 300 UNIT/ML (3 ML) INPN    Inject 18 Units into the skin every evening.  "   LOSARTAN (COZAAR) 100 MG TABLET    Take 1 tablet (100 mg total) by mouth once daily.    METOPROLOL TARTRATE (LOPRESSOR) 25 MG TABLET    Take 1 tablet (25 mg total) by mouth 2 (two) times daily.    MULTIVITAMIN (THERAGRAN) PER TABLET    Take 1 tablet by mouth once daily.    NITROGLYCERIN (NITROSTAT) 0.4 MG SL TABLET    Take one every 2-3 min till chestpain relief for 3 times and if still no relief, call MD or come to ED    ONDANSETRON (ZOFRAN-ODT) 8 MG TBDL    Take 1 tablet (8 mg total) by mouth every 12 (twelve) hours as needed.    OXYCODONE-ACETAMINOPHEN (PERCOCET) 5-325 MG PER TABLET    Take 1 tablet by mouth every 8 (eight) hours as needed for Pain.    PEN NEEDLE, DIABETIC (NOVOTWIST) 32 GAUGE X 1/5" NDLE    Use 1 needle to inject insulin four times daily    POLYETHYLENE GLYCOL (GLYCOLAX) 17 GRAM PWPK    Mix 1 capful (17 g) in liquid and take by mouth once daily.    POTASSIUM CHLORIDE SA (K-DUR,KLOR-CON) 20 MEQ TABLET    Take 1 tablet (20 mEq total) by mouth 2 (two) times daily.   Modified Medications    Modified Medication Previous Medication    ISOSORBIDE MONONITRATE (ISMO,MONOKET) 10 MG TABLET isosorbide mononitrate (ISMO,MONOKET) 10 mg tablet       Take 1 tablet (10 mg total) by mouth once daily.    Take 10 mg by mouth once daily.   Discontinued Medications    BETAMETHASONE DIPROPIONATE (DIPROLENE) 0.05 % CREAM    APPLY TO AFFECTED AREA TWICE A DAY    CEFADROXIL (DURICEF) 1 GRAM TABLET    TAKE 1 TABLET (1 G TOTAL) BY MOUTH 2 (TWO) TIMES DAILY.    DULAGLUTIDE (TRULICITY) 0.75 MG/0.5 ML PNIJ    Inject 0.5 mLs (0.75 mg total) into the skin every 7 days.    KETOCONAZOLE (NIZORAL) 2 % CREAM    APPLY TO AFFECTED AREA TWICE A DAY    NITROFURANTOIN (MACRODANTIN) 100 MG CAPSULE    TAKE 1 CAPSULE BY MOUTH EVERY EVENING    NYSTATIN (MYCOSTATIN) POWDER    Apply topically 2 (two) times daily.    OXYCODONE-ACETAMINOPHEN (PERCOCET) 5-325 MG PER TABLET    Take 1 tablet by mouth every 6 (six) hours as needed for Pain.    " ZOLPIDEM (AMBIEN) 5 MG TAB    Take 1 tablet (5 mg total) by mouth nightly as needed.     Add Isosorbide Mononitrate and monitor BP.  Compliance encouraged.  Keep an endocrinology appointment. Long discussion regarding long term sequale of an uncontrolled Diabetes.    Follow up in about 6 months (around 3/14/2021).

## 2020-09-16 NOTE — PROGRESS NOTES
Subjective:      Patient ID: Mariann Huff is a 60 y.o. female.    Chief Complaint:  Diabetes      History of Present Illness  This is a 60 y.o. female. with a past medical history of CAD, CKD with proteinuria presenting for follow-up of T2DM    She underwent L5-S1 OLIF in April 2020 and had intraoperative left ureteral injury with ureteroureteral anastomosis and ureteral stent placement, since then has had complications including sepsis with respiratory failure requiring intubation and tube feedings. She lost a significant amount of weight during her critical care stay and was only requiring prandial insulin with tube feedings. basal resumed at visit 1/2020. Has completed subsequent surgeries and feeling back to normal. Has regained weight and reports she weighs a little more than her baseline since the pandemic.     Has noted some polyuria and polydipsia for 1 month    History of Present Illness  T2DM  Diagnosed in 2002  Known complications: Retinopathy    Current Diabetes Regimen:  Toujeo 18 units daily  Novolog 6 units before every meal  Trulicity - stopped taking 3-4 months due to insurance not covering    Prior mediations tried:  - Metformin (did not tolerate)  - Invokana     Diet/Exercise:   - Breakfast: Biscuit, sausage, 1 piece of fruit   - Lunch: [place] or small snack   - Dinner: Baked chicken, broccoli, beans   - Snacks: Likes chips, candy, fruit cup  Eating more recently since pandemic    No exercise    Glucose Monitoring: Supposed be checking 4 times a day, needs >100 strips per month  Only doing it twice a week recently due to being stressed/frustrated  125-301  No log or meter today    Hypoglycemic Episodes:  Denies symptoms  - has not documented any hypoglycemias recently     Diabetes Management Status    Statin: Taking  ACE/ARB: Taking    Screening or Prevention Patient's value Goal Complete/Controlled?   HgA1C Testing and Control   Lab Results   Component Value Date    HGBA1C 12.1 (H)  "09/03/2020      Annually/Less than 8% No   Lipid profile : 09/03/2020 Annually Yes   LDL control Lab Results   Component Value Date    LDLCALC 127.2 09/03/2020    Annually/Less than 100 mg/dl  No   Nephropathy screening Lab Results   Component Value Date    LABMICR 102.0 12/29/2017     Lab Results   Component Value Date    PROTEINUA 3+ (A) 10/28/2019    Annually Yes   Blood pressure BP Readings from Last 1 Encounters:   09/17/20 124/80    Less than 140/90 No   Dilated retinal exam : 05/14/2020 Annually Yes   Foot exam   : 01/06/2020 Annually Yes       Review of Systems   Constitutional: Negative for unexpected weight change.   Eyes: Negative for visual disturbance.   Respiratory: Negative for shortness of breath.    Cardiovascular: Negative for chest pain.   Gastrointestinal: Negative for abdominal pain.   Endocrine: Positive for polydipsia and polyuria.   Musculoskeletal: Negative for arthralgias.   Skin: Negative for wound.   Allergic/Immunologic: Negative for food allergies.   Neurological: Negative for headaches.   Hematological: Does not bruise/bleed easily.       Social and family history reviewed  Current medications and allergies reviewed    Objective:   /80   Pulse 75   Resp 16   Ht 5' 4" (1.626 m)   Wt 73.2 kg (161 lb 6 oz)   LMP  (LMP Unknown)   SpO2 98%   BMI 27.70 kg/m²   Physical Exam  Constitutional:       Appearance: She is well-developed.   HENT:      Head: Normocephalic and atraumatic.   Neck:      Musculoskeletal: Neck supple.      Thyroid: No thyromegaly.   Cardiovascular:      Rate and Rhythm: Normal rate and regular rhythm.      Heart sounds: Normal heart sounds.   Pulmonary:      Effort: Pulmonary effort is normal. No respiratory distress.   Abdominal:      Palpations: Abdomen is soft.      Tenderness: There is no abdominal tenderness.   Skin:     General: Skin is warm.      Findings: No rash.   Neurological:      Mental Status: She is alert and oriented to person, place, and " time.   Psychiatric:         Behavior: Behavior normal.       BP Readings from Last 1 Encounters:   09/17/20 124/80      Wt Readings from Last 1 Encounters:   09/17/20 0955 73.2 kg (161 lb 6 oz)     Body mass index is 27.7 kg/m².    Lab Review:   Lab Results   Component Value Date    HGBA1C 12.1 (H) 09/03/2020     Lab Results   Component Value Date    CHOL 217 (H) 09/03/2020    HDL 51 09/03/2020    LDLCALC 127.2 09/03/2020    TRIG 194 (H) 09/03/2020    CHOLHDL 23.5 09/03/2020     Lab Results   Component Value Date     09/03/2020    K 4.3 09/03/2020     09/03/2020    CO2 28 09/03/2020     (H) 09/03/2020    BUN 24 (H) 09/03/2020    CREATININE 1.2 09/03/2020    CALCIUM 9.9 09/03/2020    PROT 8.2 09/03/2020    ALBUMIN 3.9 09/03/2020    BILITOT 0.9 09/03/2020    ALKPHOS 175 (H) 09/03/2020    AST 17 09/03/2020    ALT 20 09/03/2020    ANIONGAP 12 09/03/2020    ESTGFRAFRICA 57.2 (A) 09/03/2020    EGFRNONAA 49.6 (A) 09/03/2020    TSH 2.689 09/03/2020       All pertinent labs reviewed    Assessment and Plan     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 She is expected to adhere to a diabetes treatment plan and are capable of recognizing alerts and alarms. Patient is willing and able to use the device, demonstrated an understanding of the technology and is motivated to use CGM. Patients expected to adhere to a comprehensive diabetes treatment plan and patient has adequate medical supervision.    Discussed use of V-Go to facilitate compliance, patient agreeable, will start V-Go 20.    Plan  -  Resume Trulicity 0.75 mg weekly - coupon given  - Increase Toujeo to 22 units daily  - Increase Novolog to 8 U AC  - Dexcom CGM sent  - Prescription for V-Go 20 with 3 clicks per meal sent    She was advised to  devices and wait until DM education appointment to start both V-go and Dexcom. DM education order placed    Encouraged attention to diet with goal to avoid further weight gain.  Trulicity should help with this    - Up to date with eye and foot exams  - Continue statin, ARB  - F/u in 3 months    CAD (coronary artery disease)  Benefits from GLP-1RA with CV benefit  Will restart Trulicity as above    Hypertension associated with diabetes  Bp at goal  Continue ARB, metoprolol, amlodipine      Hyperlipidemia associated with type 2 diabetes mellitus  Continue statin    David Orozco MD  Endocrinology Fellow

## 2020-09-17 ENCOUNTER — OFFICE VISIT (OUTPATIENT)
Dept: ENDOCRINOLOGY | Facility: CLINIC | Age: 60
End: 2020-09-17
Payer: COMMERCIAL

## 2020-09-17 VITALS
RESPIRATION RATE: 16 BRPM | BODY MASS INDEX: 27.55 KG/M2 | DIASTOLIC BLOOD PRESSURE: 80 MMHG | HEART RATE: 75 BPM | SYSTOLIC BLOOD PRESSURE: 124 MMHG | WEIGHT: 161.38 LBS | HEIGHT: 64 IN | OXYGEN SATURATION: 98 %

## 2020-09-17 DIAGNOSIS — E11.69 HYPERLIPIDEMIA ASSOCIATED WITH TYPE 2 DIABETES MELLITUS: Chronic | ICD-10-CM

## 2020-09-17 DIAGNOSIS — I12.9 TYPE 2 DIABETES MELLITUS WITH STAGE 2 CHRONIC KIDNEY DISEASE AND HYPERTENSION: Primary | ICD-10-CM

## 2020-09-17 DIAGNOSIS — I15.2 HYPERTENSION ASSOCIATED WITH DIABETES: Chronic | ICD-10-CM

## 2020-09-17 DIAGNOSIS — E78.5 HYPERLIPIDEMIA ASSOCIATED WITH TYPE 2 DIABETES MELLITUS: Chronic | ICD-10-CM

## 2020-09-17 DIAGNOSIS — N18.2 TYPE 2 DIABETES MELLITUS WITH STAGE 2 CHRONIC KIDNEY DISEASE AND HYPERTENSION: Primary | ICD-10-CM

## 2020-09-17 DIAGNOSIS — I25.10 CORONARY ARTERY DISEASE INVOLVING NATIVE CORONARY ARTERY OF NATIVE HEART WITHOUT ANGINA PECTORIS: Chronic | ICD-10-CM

## 2020-09-17 DIAGNOSIS — E11.22 TYPE 2 DIABETES MELLITUS WITH STAGE 2 CHRONIC KIDNEY DISEASE AND HYPERTENSION: Primary | ICD-10-CM

## 2020-09-17 DIAGNOSIS — E11.59 HYPERTENSION ASSOCIATED WITH DIABETES: Chronic | ICD-10-CM

## 2020-09-17 PROCEDURE — 3079F DIAST BP 80-89 MM HG: CPT | Mod: CPTII,S$GLB,, | Performed by: STUDENT IN AN ORGANIZED HEALTH CARE EDUCATION/TRAINING PROGRAM

## 2020-09-17 PROCEDURE — 99999 PR PBB SHADOW E&M-EST. PATIENT-LVL V: ICD-10-PCS | Mod: PBBFAC,,, | Performed by: STUDENT IN AN ORGANIZED HEALTH CARE EDUCATION/TRAINING PROGRAM

## 2020-09-17 PROCEDURE — 3079F PR MOST RECENT DIASTOLIC BLOOD PRESSURE 80-89 MM HG: ICD-10-PCS | Mod: CPTII,S$GLB,, | Performed by: STUDENT IN AN ORGANIZED HEALTH CARE EDUCATION/TRAINING PROGRAM

## 2020-09-17 PROCEDURE — 3074F PR MOST RECENT SYSTOLIC BLOOD PRESSURE < 130 MM HG: ICD-10-PCS | Mod: CPTII,S$GLB,, | Performed by: STUDENT IN AN ORGANIZED HEALTH CARE EDUCATION/TRAINING PROGRAM

## 2020-09-17 PROCEDURE — 99214 PR OFFICE/OUTPT VISIT, EST, LEVL IV, 30-39 MIN: ICD-10-PCS | Mod: S$GLB,,, | Performed by: STUDENT IN AN ORGANIZED HEALTH CARE EDUCATION/TRAINING PROGRAM

## 2020-09-17 PROCEDURE — 3008F BODY MASS INDEX DOCD: CPT | Mod: CPTII,S$GLB,, | Performed by: STUDENT IN AN ORGANIZED HEALTH CARE EDUCATION/TRAINING PROGRAM

## 2020-09-17 PROCEDURE — 3074F SYST BP LT 130 MM HG: CPT | Mod: CPTII,S$GLB,, | Performed by: STUDENT IN AN ORGANIZED HEALTH CARE EDUCATION/TRAINING PROGRAM

## 2020-09-17 PROCEDURE — 99999 PR PBB SHADOW E&M-EST. PATIENT-LVL V: CPT | Mod: PBBFAC,,, | Performed by: STUDENT IN AN ORGANIZED HEALTH CARE EDUCATION/TRAINING PROGRAM

## 2020-09-17 PROCEDURE — 99214 OFFICE O/P EST MOD 30 MIN: CPT | Mod: S$GLB,,, | Performed by: STUDENT IN AN ORGANIZED HEALTH CARE EDUCATION/TRAINING PROGRAM

## 2020-09-17 PROCEDURE — 3008F PR BODY MASS INDEX (BMI) DOCUMENTED: ICD-10-PCS | Mod: CPTII,S$GLB,, | Performed by: STUDENT IN AN ORGANIZED HEALTH CARE EDUCATION/TRAINING PROGRAM

## 2020-09-17 PROCEDURE — 3046F PR MOST RECENT HEMOGLOBIN A1C LEVEL > 9.0%: ICD-10-PCS | Mod: CPTII,S$GLB,, | Performed by: STUDENT IN AN ORGANIZED HEALTH CARE EDUCATION/TRAINING PROGRAM

## 2020-09-17 PROCEDURE — 3046F HEMOGLOBIN A1C LEVEL >9.0%: CPT | Mod: CPTII,S$GLB,, | Performed by: STUDENT IN AN ORGANIZED HEALTH CARE EDUCATION/TRAINING PROGRAM

## 2020-09-17 RX ORDER — DULAGLUTIDE 0.75 MG/.5ML
0.75 INJECTION, SOLUTION SUBCUTANEOUS
Qty: 2 ML | Refills: 0 | Status: SHIPPED | OUTPATIENT
Start: 2020-09-17 | End: 2020-10-16 | Stop reason: DRUGHIGH

## 2020-09-17 RX ORDER — INSULIN ASPART 100 [IU]/ML
INJECTION, SOLUTION INTRAVENOUS; SUBCUTANEOUS
Qty: 20 ML | Refills: 3 | Status: SHIPPED | OUTPATIENT
Start: 2020-09-17 | End: 2020-09-18

## 2020-09-17 RX ORDER — INSULIN GLARGINE 300 U/ML
22 INJECTION, SOLUTION SUBCUTANEOUS NIGHTLY
Qty: 6 ML | Refills: 5
Start: 2020-09-17 | End: 2021-05-05 | Stop reason: SDUPTHER

## 2020-09-17 RX ORDER — BLOOD-GLUCOSE TRANSMITTER
1 EACH MISCELLANEOUS CONTINUOUS PRN
Qty: 1 EACH | Status: SHIPPED | OUTPATIENT
Start: 2020-09-17 | End: 2021-04-20 | Stop reason: SDUPTHER

## 2020-09-17 RX ORDER — BLOOD-GLUCOSE,RECEIVER,CONT
1 EACH MISCELLANEOUS CONTINUOUS PRN
Qty: 1 EACH | Status: SHIPPED | OUTPATIENT
Start: 2020-09-17 | End: 2021-07-14 | Stop reason: SDUPTHER

## 2020-09-17 RX ORDER — INSULIN ASPART 100 [IU]/ML
8 INJECTION, SOLUTION INTRAVENOUS; SUBCUTANEOUS
Qty: 6 ML | Refills: 5
Start: 2020-09-17 | End: 2021-05-12

## 2020-09-17 RX ORDER — BLOOD-GLUCOSE SENSOR
3 EACH MISCELLANEOUS CONTINUOUS PRN
Qty: 3 EACH | Status: SHIPPED | OUTPATIENT
Start: 2020-09-17 | End: 2021-04-20 | Stop reason: SDUPTHER

## 2020-09-17 NOTE — PATIENT INSTRUCTIONS
We are going to prescribe the insulin Device VGo, you will need a diabetes educator appointment prior to start    In the meantime, increase Toujeo to 22 units daily and Novolog to 8 units before every meal    Start Trulicity

## 2020-09-17 NOTE — PROGRESS NOTES
I have reviewed and concur with Dr. Dawson's history, physical, assessment, and plan.  I have personally interviewed and examined the patient.    Tatiana Randolph MD

## 2020-09-18 RX ORDER — INSULIN LISPRO 100 [IU]/ML
INJECTION, SOLUTION INTRAVENOUS; SUBCUTANEOUS
Qty: 20 ML | Refills: 3 | Status: SHIPPED | OUTPATIENT
Start: 2020-09-18 | End: 2021-05-18

## 2020-09-22 ENCOUNTER — HOSPITAL ENCOUNTER (OUTPATIENT)
Dept: RADIOLOGY | Facility: HOSPITAL | Age: 60
Discharge: HOME | End: 2020-09-22
Attending: INTERNAL MEDICINE
Payer: COMMERCIAL

## 2020-09-22 ENCOUNTER — TELEPHONE (OUTPATIENT)
Dept: PHARMACY | Facility: CLINIC | Age: 60
End: 2020-09-22

## 2020-09-22 DIAGNOSIS — Z12.31 ENCOUNTER FOR SCREENING MAMMOGRAM FOR BREAST CANCER: ICD-10-CM

## 2020-09-22 PROCEDURE — 77063 BREAST TOMOSYNTHESIS BI: CPT | Mod: 26,,, | Performed by: RADIOLOGY

## 2020-09-22 PROCEDURE — 77063 MAMMO DIGITAL SCREENING BILAT WITH TOMO: ICD-10-PCS | Mod: 26,,, | Performed by: RADIOLOGY

## 2020-09-22 PROCEDURE — 77067 MAMMO DIGITAL SCREENING BILAT WITH TOMO: ICD-10-PCS | Mod: 26,,, | Performed by: RADIOLOGY

## 2020-09-22 PROCEDURE — 77067 SCR MAMMO BI INCL CAD: CPT | Mod: 26,,, | Performed by: RADIOLOGY

## 2020-09-22 PROCEDURE — 77067 SCR MAMMO BI INCL CAD: CPT | Mod: TC,PO

## 2020-09-28 ENCOUNTER — CLINICAL SUPPORT (OUTPATIENT)
Dept: DIABETES | Facility: CLINIC | Age: 60
End: 2020-09-28
Payer: COMMERCIAL

## 2020-09-28 DIAGNOSIS — E11.22 TYPE 2 DIABETES MELLITUS WITH STAGE 2 CHRONIC KIDNEY DISEASE AND HYPERTENSION: Primary | ICD-10-CM

## 2020-09-28 DIAGNOSIS — I12.9 TYPE 2 DIABETES MELLITUS WITH STAGE 2 CHRONIC KIDNEY DISEASE AND HYPERTENSION: Primary | ICD-10-CM

## 2020-09-28 DIAGNOSIS — N18.2 TYPE 2 DIABETES MELLITUS WITH STAGE 2 CHRONIC KIDNEY DISEASE AND HYPERTENSION: Primary | ICD-10-CM

## 2020-09-28 PROCEDURE — 95249 CONT GLUC MNTR PT PROV EQP: CPT | Mod: S$GLB,,, | Performed by: INTERNAL MEDICINE

## 2020-09-28 PROCEDURE — G0108 PR DIAB MANAGE TRN  PER INDIV: ICD-10-PCS | Mod: S$GLB,,, | Performed by: INTERNAL MEDICINE

## 2020-09-28 PROCEDURE — 95249 PR GLUCOSE MONITORING, 72 HRS, SUB-Q SENSOR, PATIENT PROVIDED: ICD-10-PCS | Mod: S$GLB,,, | Performed by: INTERNAL MEDICINE

## 2020-09-28 PROCEDURE — 99999 PR PBB SHADOW E&M-EST. PATIENT-LVL II: ICD-10-PCS | Mod: PBBFAC,,,

## 2020-09-28 PROCEDURE — 99999 PR PBB SHADOW E&M-EST. PATIENT-LVL II: CPT | Mod: PBBFAC,,,

## 2020-09-28 PROCEDURE — G0108 DIAB MANAGE TRN  PER INDIV: HCPCS | Mod: S$GLB,,, | Performed by: INTERNAL MEDICINE

## 2020-09-28 NOTE — PROGRESS NOTES
"Diabetes Education  Author: Jolanta Rosario RN, CDE  Date: 9/28/2020    DIABETES EDUCATION  DEXCOM G6 CGM TRAINING     Patient referred to clinic today for Dexcom G6 continuous glucose sensor system training.  Patient arrived with  fully charged.  Education was provided using "Quick Start Guide" per Dexcom protocol.     Pt will be using her .  § Overview:  5min glucose reading updates, trending arrows, BG graph screens, battery life indicator, Blue Tooth Symbol.  § Menus: trend Graph, start sensor, enter BG, events, Alerts, Settings, Shutdown, Stop Sensor  §  Settings:                          * Urgent Low: 55 mg/dL                          * Urgent Low Soon: on reminder 15 mins                          * Low alert: 80 mg/dl reminder 15 mins                          * High alert: 240 mg/dl reminder 30 mins                          * Rise rate: off                          * Fall Rate: off                          * Signal Loss: on reminder 20 mins                          * No Reading: on reminder 20 mins                          * Always sound: on                          * Alert Schedule:  (instructed on how to set up alert schedule if desired)     · Reviewed additional setting options with patient, including Graph Height and Transmitter ID. Transmitter was paired with .    · Reviewed where to find sensor insertion time and sensor expiration date.   · Discussed no need to calibrate sensor during the entirety of the 10 day wear. Discussed that pt can calibrate sensor if desired, but at that time she will need to continue calibrating every 12 hours for sensor to remain accurate. Reviewed appropriate calibration techniques.  · Reviewed sensor site selection. Site selected and prepped using aseptic technique Inserted to left abdomin. Transmitter placed in pod and secured.  · Practiced sensor pod/transmitter removal from site, and removal of transmitter from sensor pod.  · Patient able to " demonstrate without difficulty.  Encouraged to review manual prior to starting another sensor.   · Reviewed problem solving aspects of sensor transmission/ variables that can disrupt RF transmission.  range 20 feet, but the first 3 hrs keep within 3 feet of transmitter.  · Pt instructed on lag time of interstitial fluid from CBG and was advised to tx hypoglycemia and dose insulin based on SMBG values.  · Dexcom technical support contact number given and examples of when to contact them discussed. Pt will download software at home for Dexcom download.        The patient received a copy of today's self-management plan and verbalized understanding of the care plan, goals, and all of today's instructions.      The patient was encouraged to communicate with her physician and care team regarding her condition(s) and treatment.  I provided the patient with my contact information today and encouraged her to contact me via phone or patient portal as needed.     DIABETES EDUCATOR NOTE  VGO INSULIN DELIVERY DEVICE TRAINING    Patient is here for training on the VGo 20 which will provide 20 units of basal insulin given over 24 hours (0.833 units/hr) and up to 36 units of on-demand bolus dosing in 2-unit increments.     Patient will only be using Novolog insulin in the VGo system. She will be giving 3 clicks (6 units) at each meal and 0 clicks at each snack.    Patient took her long acting insulin last night since she preferred to change the V-go before bedtime.  Patient was also advised that she will discontinue taking all insulin injections and that she will only use the Novolog vial with the VGo system.  For the remainder of today, she will continue giving her 8 units of Novolog before each meal and will use the V-Go beginning tomorrow for all her meals.     Reviewed that pt should also DISCONTINUE the following diabetes medications: Toujeo tonight  Reviewed that pt should also CONTINUE the following diabetes  medications: Trulicity    Patient was instructed on the following:    Insert vial into the EZ Fill and leave in place until vial is empty   Remove plug and insert VGo    Draw insulin into EZ Fill and fill VGo with insulin (check that VGo is full of insulin)    Remove VGo and clean and replace plug (advised to wipe the circular opening with an alcohol swab before replacing)    Select site for VGo (site selection reviewed) and prepare infusion site with aseptic technique    Remove button cover and adhesive liner    Attach VGo to site selected and insert needle by pushing needle button in to activate basal rate    Instructed patient on how to activate the bolus ready button and to push the bolus delivery button into the VGo to deliver 2 units of insulin (1 click = 2 units). Patient advised that once all 36 units of the on-demand bolus have been given, the bolus buttons will lock, but the basal insulin will continue for the remainder of the 24 hours    To remove, release needle by sliding and pressing the needle release button    Remove the VGo and discard (the VGo is 100% disposable)   Patient instructed that the VGo needs to be changed every 24 hours    Patient was able to perform successful return demonstration of all.     General precautions reviewed with the patient:    VGo needs to be removed before having an MRI. Replace with a new V-Go after the test or procedure is completed. Patient also advised to check with her doctor before any type of surgical procedure to see if the V-Go can be worn.    Patient advised to monitor his/her BG at least 4 times a day (pre-meals and at bedtime)    Patient advised to keep back up long-acting insulin pens (non-) should a problem develop with the VGo. Patient also advised that he/she should have directions from her MD regarding insulin doses should he/she need to switch back to multiple daily injections temporarily.    The VGo should not be exposed to direct  sunlight, hot tub, whirlpool or sauna use (replace with a new filled VGo afterward)    Check that the VGo is securely in place during and after periods of increased physical activity, exposure to water, or gone under water to the depth of 3 feet, 3 inches (the VGo can go under water and continue to work safely)    Reviewed s/s and tx of hypoglycemia    All of patient's questions and concerns were addressed. Patient instructed to keep a BG log (log sheets provided) and send BG readings to me or prescribing provider within 1 week for review.   Phone number was provided and patient advised to call with any questions or concerns.     Plan: Pt is scheduled for f/u visit on Monday, 9/28/2020    Education time: 120 mins

## 2020-10-05 ENCOUNTER — CLINICAL SUPPORT (OUTPATIENT)
Dept: DIABETES | Facility: CLINIC | Age: 60
End: 2020-10-05
Payer: COMMERCIAL

## 2020-10-05 DIAGNOSIS — I12.9 TYPE 2 DIABETES MELLITUS WITH STAGE 2 CHRONIC KIDNEY DISEASE AND HYPERTENSION: ICD-10-CM

## 2020-10-05 DIAGNOSIS — E11.22 TYPE 2 DIABETES MELLITUS WITH STAGE 2 CHRONIC KIDNEY DISEASE AND HYPERTENSION: ICD-10-CM

## 2020-10-05 DIAGNOSIS — N18.2 TYPE 2 DIABETES MELLITUS WITH STAGE 2 CHRONIC KIDNEY DISEASE AND HYPERTENSION: ICD-10-CM

## 2020-10-05 PROCEDURE — G0108 DIAB MANAGE TRN  PER INDIV: HCPCS | Mod: S$GLB,,, | Performed by: INTERNAL MEDICINE

## 2020-10-05 PROCEDURE — 99999 PR PBB SHADOW E&M-EST. PATIENT-LVL I: CPT | Mod: PBBFAC,,,

## 2020-10-05 PROCEDURE — G0108 PR DIAB MANAGE TRN  PER INDIV: ICD-10-PCS | Mod: S$GLB,,, | Performed by: INTERNAL MEDICINE

## 2020-10-05 PROCEDURE — 99999 PR PBB SHADOW E&M-EST. PATIENT-LVL I: ICD-10-PCS | Mod: PBBFAC,,,

## 2020-10-07 NOTE — PROGRESS NOTES
Diabetes Education  Author: Jolanta Rosario RN, CDE  Date: 10/5/2020  Diabetes Care Management Summary  Diabetes Education Record Assessment/Progress: Post Program/Follow-up  Current Diabetes Risk Level: High     Last A1c:   Lab Results   Component Value Date    HGBA1C 12.1 (H) 09/03/2020     Last Visit with Diabetes Educator: : 10/05/2020  Last OPCM Referral: : Not Found   Enrolled in OPCM: No    Patient seen today for f/u V-Go start.  Her Dexcom was downloaded and reviewed.  She did not bring in her V-Go logs as instructed. Patient states is eating 2 meals per day and gives 3 clicks.  Most days she does run elevated. She is elevated at bedtime most evenings.  Advised to give one click at bedtime, suspect she may be eating a snack.    Reviewed meals: salmon, carerot, salad,yellow rice, water, sugar free tea    Plan: advised to bring in log sheets for review.      Education time: 30 mins

## 2020-10-08 ENCOUNTER — IMMUNIZATION (OUTPATIENT)
Dept: PHARMACY | Facility: CLINIC | Age: 60
End: 2020-10-08
Payer: COMMERCIAL

## 2020-10-16 DIAGNOSIS — I12.9 TYPE 2 DIABETES MELLITUS WITH STAGE 2 CHRONIC KIDNEY DISEASE AND HYPERTENSION: Primary | ICD-10-CM

## 2020-10-16 DIAGNOSIS — N18.2 TYPE 2 DIABETES MELLITUS WITH STAGE 2 CHRONIC KIDNEY DISEASE AND HYPERTENSION: Primary | ICD-10-CM

## 2020-10-16 DIAGNOSIS — E11.22 TYPE 2 DIABETES MELLITUS WITH STAGE 2 CHRONIC KIDNEY DISEASE AND HYPERTENSION: Primary | ICD-10-CM

## 2020-10-23 RX ORDER — ESZOPICLONE 2 MG/1
2 TABLET, FILM COATED ORAL NIGHTLY
Qty: 30 TABLET | Refills: 0 | Status: SHIPPED | OUTPATIENT
Start: 2020-10-23 | End: 2020-12-14

## 2020-11-14 ENCOUNTER — LAB VISIT (OUTPATIENT)
Dept: LAB | Facility: HOSPITAL | Age: 60
End: 2020-11-14
Attending: INTERNAL MEDICINE
Payer: COMMERCIAL

## 2020-11-14 ENCOUNTER — DOCUMENTATION ONLY (OUTPATIENT)
Dept: INTERNAL MEDICINE | Facility: CLINIC | Age: 60
End: 2020-11-14

## 2020-11-14 ENCOUNTER — OFFICE VISIT (OUTPATIENT)
Dept: INTERNAL MEDICINE | Facility: CLINIC | Age: 60
End: 2020-11-14
Payer: COMMERCIAL

## 2020-11-14 VITALS
DIASTOLIC BLOOD PRESSURE: 70 MMHG | WEIGHT: 165.38 LBS | HEIGHT: 64 IN | SYSTOLIC BLOOD PRESSURE: 130 MMHG | HEART RATE: 85 BPM | BODY MASS INDEX: 28.24 KG/M2 | OXYGEN SATURATION: 96 % | TEMPERATURE: 97 F

## 2020-11-14 DIAGNOSIS — R10.32 LLQ PAIN: Primary | ICD-10-CM

## 2020-11-14 DIAGNOSIS — R10.32 LLQ PAIN: ICD-10-CM

## 2020-11-14 LAB
ANION GAP SERPL CALC-SCNC: 11 MMOL/L (ref 8–16)
BUN SERPL-MCNC: 19 MG/DL (ref 6–20)
CALCIUM SERPL-MCNC: 9.4 MG/DL (ref 8.7–10.5)
CHLORIDE SERPL-SCNC: 105 MMOL/L (ref 95–110)
CO2 SERPL-SCNC: 29 MMOL/L (ref 23–29)
CREAT SERPL-MCNC: 1.2 MG/DL (ref 0.5–1.4)
EST. GFR  (AFRICAN AMERICAN): 57.2 ML/MIN/1.73 M^2
EST. GFR  (NON AFRICAN AMERICAN): 49.6 ML/MIN/1.73 M^2
GLUCOSE SERPL-MCNC: 228 MG/DL (ref 70–110)
POTASSIUM SERPL-SCNC: 4.2 MMOL/L (ref 3.5–5.1)
SODIUM SERPL-SCNC: 145 MMOL/L (ref 136–145)

## 2020-11-14 PROCEDURE — 3008F BODY MASS INDEX DOCD: CPT | Mod: CPTII,S$GLB,, | Performed by: INTERNAL MEDICINE

## 2020-11-14 PROCEDURE — 36415 COLL VENOUS BLD VENIPUNCTURE: CPT | Mod: PO

## 2020-11-14 PROCEDURE — 80048 BASIC METABOLIC PNL TOTAL CA: CPT

## 2020-11-14 PROCEDURE — 3008F PR BODY MASS INDEX (BMI) DOCUMENTED: ICD-10-PCS | Mod: CPTII,S$GLB,, | Performed by: INTERNAL MEDICINE

## 2020-11-14 PROCEDURE — 3078F DIAST BP <80 MM HG: CPT | Mod: CPTII,S$GLB,, | Performed by: INTERNAL MEDICINE

## 2020-11-14 PROCEDURE — 1125F AMNT PAIN NOTED PAIN PRSNT: CPT | Mod: S$GLB,,, | Performed by: INTERNAL MEDICINE

## 2020-11-14 PROCEDURE — 99999 PR PBB SHADOW E&M-EST. PATIENT-LVL III: CPT | Mod: PBBFAC,,, | Performed by: INTERNAL MEDICINE

## 2020-11-14 PROCEDURE — 3078F PR MOST RECENT DIASTOLIC BLOOD PRESSURE < 80 MM HG: ICD-10-PCS | Mod: CPTII,S$GLB,, | Performed by: INTERNAL MEDICINE

## 2020-11-14 PROCEDURE — 1125F PR PAIN SEVERITY QUANTIFIED, PAIN PRESENT: ICD-10-PCS | Mod: S$GLB,,, | Performed by: INTERNAL MEDICINE

## 2020-11-14 PROCEDURE — 3075F PR MOST RECENT SYSTOLIC BLOOD PRESS GE 130-139MM HG: ICD-10-PCS | Mod: CPTII,S$GLB,, | Performed by: INTERNAL MEDICINE

## 2020-11-14 PROCEDURE — 3075F SYST BP GE 130 - 139MM HG: CPT | Mod: CPTII,S$GLB,, | Performed by: INTERNAL MEDICINE

## 2020-11-14 PROCEDURE — 99214 PR OFFICE/OUTPT VISIT, EST, LEVL IV, 30-39 MIN: ICD-10-PCS | Mod: S$GLB,,, | Performed by: INTERNAL MEDICINE

## 2020-11-14 PROCEDURE — 99999 PR PBB SHADOW E&M-EST. PATIENT-LVL III: ICD-10-PCS | Mod: PBBFAC,,, | Performed by: INTERNAL MEDICINE

## 2020-11-14 PROCEDURE — 99214 OFFICE O/P EST MOD 30 MIN: CPT | Mod: S$GLB,,, | Performed by: INTERNAL MEDICINE

## 2020-11-14 RX ORDER — METRONIDAZOLE 500 MG/1
500 TABLET ORAL 3 TIMES DAILY
Qty: 30 TABLET | Refills: 0 | Status: SHIPPED | OUTPATIENT
Start: 2020-11-14 | End: 2020-11-24

## 2020-11-14 RX ORDER — CIPROFLOXACIN 500 MG/1
500 TABLET ORAL EVERY 12 HOURS
Qty: 20 TABLET | Refills: 0 | Status: SHIPPED | OUTPATIENT
Start: 2020-11-14 | End: 2020-11-24

## 2020-11-14 RX ORDER — ONDANSETRON 8 MG/1
8 TABLET, ORALLY DISINTEGRATING ORAL EVERY 12 HOURS PRN
Qty: 30 TABLET | Refills: 2 | Status: SHIPPED | OUTPATIENT
Start: 2020-11-14 | End: 2021-05-18

## 2020-11-14 NOTE — PROGRESS NOTES
Subjective:       Patient ID: Mariann Huff is a 60 y.o. female.    Chief Complaint: Abdominal Pain    HPI     60-year-old female here for evaluation of abdominal pain.  She has abdominal pain and feels tired.  She feels nauseous.  This has been going on the last two weeks.  No fevers or chills.  No changes in her bowel habits.  The pain is described as aching.  It is located through the top of her abdomen.  Her stool has not gotten darker that she is aware of.    Review of Systems      Objective:      Physical Exam  Vitals signs reviewed.   Constitutional:       Appearance: She is well-developed.   HENT:      Head: Normocephalic and atraumatic.      Mouth/Throat:      Pharynx: No oropharyngeal exudate.   Eyes:      General: No scleral icterus.        Right eye: No discharge.         Left eye: No discharge.      Pupils: Pupils are equal, round, and reactive to light.   Neck:      Musculoskeletal: Normal range of motion and neck supple.      Thyroid: No thyromegaly.      Trachea: No tracheal deviation.   Cardiovascular:      Rate and Rhythm: Normal rate and regular rhythm.      Heart sounds: Normal heart sounds. No murmur. No friction rub. No gallop.    Pulmonary:      Effort: Pulmonary effort is normal. No respiratory distress.      Breath sounds: Normal breath sounds. No wheezing or rales.   Chest:      Chest wall: No tenderness.   Abdominal:      General: Bowel sounds are normal. There is no distension.      Palpations: Abdomen is soft. There is no mass.      Tenderness: There is abdominal tenderness in the right lower quadrant and left lower quadrant. There is rebound. There is no guarding.   Musculoskeletal: Normal range of motion.         General: No tenderness.   Skin:     General: Skin is warm and dry.      Coloration: Skin is not pale.      Findings: No erythema or rash.   Neurological:      Mental Status: She is alert and oriented to person, place, and time.   Psychiatric:         Behavior: Behavior  normal.         Assessment:       1. LLQ pain        Plan:       1.  Check BMP stat.  Check CT scan abdomen pelvis with contrast stat.  Cipro 500 mg b.i.d., Flagyl 500 mg t.i.d. both times 10 days.  Depending on out, CT, may need CT scan in 3 months.

## 2020-11-14 NOTE — PROGRESS NOTES
Patient refused stat CT offer on today because 200 appointment enter feared with plan she has.  Offer to try and booked her at other  Location she refused and she would just like to wait .

## 2020-11-16 ENCOUNTER — OFFICE VISIT (OUTPATIENT)
Dept: NEUROSURGERY | Facility: CLINIC | Age: 60
End: 2020-11-16
Payer: COMMERCIAL

## 2020-11-16 ENCOUNTER — TELEPHONE (OUTPATIENT)
Dept: INTERNAL MEDICINE | Facility: CLINIC | Age: 60
End: 2020-11-16

## 2020-11-16 ENCOUNTER — TELEPHONE (OUTPATIENT)
Dept: NEUROSURGERY | Facility: CLINIC | Age: 60
End: 2020-11-16

## 2020-11-16 VITALS — DIASTOLIC BLOOD PRESSURE: 80 MMHG | HEART RATE: 83 BPM | SYSTOLIC BLOOD PRESSURE: 170 MMHG

## 2020-11-16 DIAGNOSIS — M53.3 SACROILIAC JOINT DYSFUNCTION OF RIGHT SIDE: Primary | ICD-10-CM

## 2020-11-16 DIAGNOSIS — Z41.9 SURGERY, ELECTIVE: ICD-10-CM

## 2020-11-16 DIAGNOSIS — Z98.1 STATUS POST LUMBAR SPINAL FUSION: ICD-10-CM

## 2020-11-16 PROCEDURE — 3077F SYST BP >= 140 MM HG: CPT | Mod: CPTII,S$GLB,, | Performed by: NEUROLOGICAL SURGERY

## 2020-11-16 PROCEDURE — 99214 OFFICE O/P EST MOD 30 MIN: CPT | Mod: S$GLB,,, | Performed by: NEUROLOGICAL SURGERY

## 2020-11-16 PROCEDURE — 99999 PR PBB SHADOW E&M-EST. PATIENT-LVL IV: CPT | Mod: PBBFAC,,, | Performed by: NEUROLOGICAL SURGERY

## 2020-11-16 PROCEDURE — 3077F PR MOST RECENT SYSTOLIC BLOOD PRESSURE >= 140 MM HG: ICD-10-PCS | Mod: CPTII,S$GLB,, | Performed by: NEUROLOGICAL SURGERY

## 2020-11-16 PROCEDURE — 99999 PR PBB SHADOW E&M-EST. PATIENT-LVL IV: ICD-10-PCS | Mod: PBBFAC,,, | Performed by: NEUROLOGICAL SURGERY

## 2020-11-16 PROCEDURE — 1125F PR PAIN SEVERITY QUANTIFIED, PAIN PRESENT: ICD-10-PCS | Mod: S$GLB,,, | Performed by: NEUROLOGICAL SURGERY

## 2020-11-16 PROCEDURE — 3079F PR MOST RECENT DIASTOLIC BLOOD PRESSURE 80-89 MM HG: ICD-10-PCS | Mod: CPTII,S$GLB,, | Performed by: NEUROLOGICAL SURGERY

## 2020-11-16 PROCEDURE — 99214 PR OFFICE/OUTPT VISIT, EST, LEVL IV, 30-39 MIN: ICD-10-PCS | Mod: S$GLB,,, | Performed by: NEUROLOGICAL SURGERY

## 2020-11-16 PROCEDURE — 1125F AMNT PAIN NOTED PAIN PRSNT: CPT | Mod: S$GLB,,, | Performed by: NEUROLOGICAL SURGERY

## 2020-11-16 PROCEDURE — 3079F DIAST BP 80-89 MM HG: CPT | Mod: CPTII,S$GLB,, | Performed by: NEUROLOGICAL SURGERY

## 2020-11-16 RX ORDER — OXYCODONE HYDROCHLORIDE 10 MG/1
5-10 TABLET ORAL EVERY 12 HOURS PRN
Qty: 60 TABLET | Refills: 0 | Status: SHIPPED | OUTPATIENT
Start: 2020-11-16 | End: 2021-02-22 | Stop reason: SDUPTHER

## 2020-11-16 NOTE — PROGRESS NOTES
NEUROSURGICAL PROGRESS NOTE    DATE OF SERVICE:  11/16/2020    ATTENDING PHYSICIAN:  Mk George MD    SUBJECTIVE:    INTERIM HISTORY:    This is a very pleasant 60 y.o. female, who is status post L5-S1 oblique interbody fusion with posterior instrumentation for DDD with foraminal stenosis. She has developed worsening right SI joint pain over the last 6 months.  The pain is worse with prolonged sitting or walking.  She has tried physical therapy exercises including hip strengthening and flexibility exercises in the past without significant pain improvement.  She never received SI joint injection.  She reports that her blood sugar is better controlled with insulin.  No new onset of motor weakness or numbness.              PAST MEDICAL HISTORY:  Active Ambulatory Problems     Diagnosis Date Noted    Hypertension associated with diabetes     Hyperlipidemia associated with type 2 diabetes mellitus     CAD (coronary artery disease)     Sleep apnea     Carotid stenosis, bilateral 10/24/2012    History of non-ST elevation myocardial infarction (NSTEMI) 02/17/2014    S/P coronary artery stent placement 02/26/2014    Nuclear sclerosis - Right Eye 03/18/2014    Primary localized osteoarthrosis, lower leg 06/18/2014    Chronic sinusitis 05/07/2015    PSC (posterior subcapsular cataract), right 08/17/2015    DME (diabetic macular edema) 08/17/2015    Refractive error 08/17/2015    Pseudophakia 08/17/2015    Senile nuclear sclerosis 09/01/2015    Type 2 diabetes mellitus with diabetic peripheral angiopathy without gangrene 02/24/2016    Diabetic peripheral neuropathy associated with type 2 diabetes mellitus 02/24/2016    Cervical arthritis 08/29/2016    Neurogenic claudication 08/29/2016    Lumbar herniated disc 10/24/2016    Fatty liver disease, nonalcoholic 11/01/2017    Arthritis of lumbar spine 07/23/2018    Chronic pain 09/25/2018    Lumbar radiculopathy 02/06/2019    Lumbosacral radiculopathy  at L5 04/12/2019    Ureteral transection of left native kidney 04/13/2019    Type 2 diabetes mellitus with stage 2 chronic kidney disease and hypertension 04/20/2019    Asthma 04/20/2019    Normocytic anemia     Bradycardia 05/08/2019    Delayed surgical wound healing 05/13/2019    Rectal ulcer     Palliative care encounter 05/27/2019    Acute deep vein thrombosis (DVT) of femoral vein of right lower extremity     Impaired functional mobility and endurance 05/31/2019    (HFpEF) heart failure with preserved ejection fraction 06/06/2019    Alteration in skin integrity 06/07/2019    Debility 06/10/2019    Staph infection     Chronic combined systolic and diastolic CHF (congestive heart failure) 07/02/2019    Left ureteral injury 07/24/2019    Hydronephrosis 10/07/2019    Serum albumin decreased 11/01/2019    Weakness of both lower extremities 11/11/2019    Poor balance 11/11/2019    Intraoperative ureteral injury 12/11/2019    Uncontrolled type 2 diabetes mellitus with both eyes affected by severe nonproliferative retinopathy and macular edema, with long-term current use of insulin 01/09/2020    Hypertensive retinopathy, bilateral 01/09/2020     Resolved Ambulatory Problems     Diagnosis Date Noted    Diabetes mellitus type II, uncontrolled 10/24/2012    Obesity 10/24/2012    Neck pain 03/07/2013    Non compliance with medical treatment 06/19/2013    PAPA (acute kidney injury) 02/17/2014    Coronary stent restenosis 02/26/2014    Type II or unspecified type diabetes mellitus with other specified manifestations, uncontrolled 06/06/2014    Peripheral neuropathy 06/06/2014    Enthesopathy of hip region 06/18/2014    Type II or unspecified type diabetes mellitus with peripheral circulatory disorders, uncontrolled(250.72) 09/25/2014    Diabetes mellitus with peripheral artery disease 02/02/2015    Dysphagia 08/12/2016    Neck pain on right side 10/14/2016    Chronic bilateral low back  pain with sciatica 10/14/2016    Decreased range of motion 10/14/2016    Decreased strength 10/14/2016    Decreased functional mobility 10/14/2016    Closed nondisplaced fracture of fifth metatarsal bone of right foot with routine healing 07/14/2017    Ureteral stent retained 04/16/2019    Sepsis 04/20/2019    Encephalopathy 04/20/2019    Altered mental status     Encounter for weaning from ventilator     Metabolic acidosis     At risk for fluid volume overload 04/21/2019    On enteral nutrition 04/24/2019    Postoperative infection     Acute postoperative respiratory failure     Pulmonary edema     Dermatitis associated with moisture 05/06/2019    BRBPR (bright red blood per rectum) 05/07/2019    Urinary tract infection without hematuria     Status post insertion of percutaneous endoscopic gastrostomy (PEG) tube     Pain     Protein malnutrition     Acute on chronic respiratory failure with hypoxia 06/06/2019    Elevated troponin 06/06/2019    Fever 06/07/2019    Pneumonia of both lungs due to Pseudomonas species 06/09/2019    Acute combined systolic and diastolic heart failure 06/13/2019    Excessive carbohydrate intake 06/14/2019    Tracheostomy in place     Nephrostomy status 07/11/2019    Nephrostomy tube displaced 07/18/2019    Weakness of both lower extremities 08/19/2019    Poor balance 08/19/2019    Gait, antalgic 08/19/2019     Past Medical History:   Diagnosis Date    Anticoagulant long-term use     Asthma     Back pain     Bradycardia, unspecified 5/8/2019    Carotid artery stenosis     Diabetes mellitus type 2 in obese     HTN (hypertension), benign     Hyperlipidemia     Keloid cicatrix     NSTEMI (non-ST elevated myocardial infarction)        PAST SURGICAL HISTORY:  Past Surgical History:   Procedure Laterality Date    ANTEGRADE NEPHROSTOGRAPHY Left 12/11/2019    Procedure: Nephrostogram - antegrade;  Surgeon: Roibn Boyd MD;  Location: [facility] OR [address] [place] [address];   Service: Urology;  Laterality: Left;    BRONCHOSCOPY N/A 2019    Procedure: BRONCHOSCOPY;  Surgeon: Sean Ruano MD;  [facility];  Service: General;  Laterality: N/A;    CARDIAC CATHETERIZATION      CATARACT EXTRACTION      cataract extraction left eye      cataracts      CAUDAL EPIDURAL STEROID INJECTION N/A 2019    Procedure: Injection-steroid-epidural-caudal;  Surgeon: Dave Bentley Jr., MD;  Location: [facility] PAIN [facility]GT;  Service: Pain Management;  Laterality: N/A;     SECTION, LOW TRANSVERSE      COLONOSCOPY N/A 2019    Procedure: COLONOSCOPY;  Surgeon: Al Alaniz MD;  Location: [facility] ENDO (2ND FLR);  Service: Endoscopy;  Laterality: N/A;    CORONARY ANGIOPLASTY      CYSTOGRAM N/A 2019    Procedure: CYSTOGRAM INTRAOP;  Surgeon: Robin Boyd MD;  [facility];  Service: Urology;  Laterality: N/A;  1 HOUR    CYSTOSCOPY W/ RETROGRADES Left 2019    Procedure: CYSTOSCOPY, WITH RETROGRADE PYELOGRAM;  Surgeon: Robin Boyd MD;  [facility];  Service: Urology;  Laterality: Left;  fluro    ESOPHAGOGASTRODUODENOSCOPY W/ PEG  2019    Procedure: EGD, WITH PEG TUBE INSERTION;  Surgeon: Sean Ruano MD;  [facility];  Service: General;;    EXCISION TURBINATE, SUBMUCOUS      FLEXIBLE SIGMOIDOSCOPY N/A 2019    Procedure: SIGMOIDOSCOPY, FLEXIBLE;  Surgeon: ALBERTO Amin MD;  [facility]  Service: Endoscopy;  Laterality: N/A;    FLEXIBLE SIGMOIDOSCOPY N/A 2019    Procedure: SIGMOIDOSCOPY, FLEXIBLE;  Surgeon: ALBERTO Amin MD;  [facility]  Service: Endoscopy;  Laterality: N/A;    FUSION OF LUMBAR SPINE BY ANTERIOR APPROACH Left 2019    Procedure: FUSION, SPINE, LUMBAR, ANTERIOR APPROACH Left L5-S1 Anterior to Psoa Interbody Fusion, L5-S1 Posterior Instrumentation;  Surgeon: Mk George MD;  [facility];  Service:  Neurosurgery;  Laterality: Left;  Porcedure:  Left L5-S1 Anterior to Psoa Interbody Fusion, L5-S1 Posterior Instrumentation  Surgery Time: 4 Hrs  LOS: 2-3  Anesthesia: General   Blood: Type & Screen  R    HAND SURGERY Left     HAND SURGERY Right     torn ligament repair/ Dr. Yeboah    HYSTERECTOMY      left foot surgery      left wrist surgery      LYSIS OF ADHESIONS N/A 2/19/2020    Procedure: LYSIS, ADHESIONS;  Surgeon: Robin Boyd MD;  [facility];  Service: Urology;  Laterality: N/A;    NASAL SEPTUM SURGERY  5/7/15    PERCUTANEOUS NEPHROSTOMY Left 4/21/2019    Procedure: Creation, Nephrostomy, Percutaneous;  Surgeon: Karina Surgeon;  Location: [facility] KARINA;  Service: Anesthesiology;  Laterality: Left;    REPAIR OF URETER  4/12/2019    Procedure: REPAIR, URETER;  Surgeon: Mk George MD;  [facility];  Service: Neurosurgery;;    REPLACEMENT OF NEPHROSTOMY TUBE N/A 7/18/2019    Procedure: REPLACEMENT, NEPHROSTOMY TUBE;  Surgeon: Leo Diagnostic Provider;  [facility];  Service: Anesthesiology;  Laterality: N/A;  188    REPLACEMENT OF NEPHROSTOMY TUBE N/A 7/24/2019    Procedure: REPLACEMENT, NEPHROSTOMY TUBE;  Surgeon: Dos Diagnostic Provider;  [facility];  Service: Anesthesiology;  Laterality: N/A;  188    REPLACEMENT OF NEPHROSTOMY TUBE N/A 10/7/2019    Procedure: REPLACEMENT, NEPHROSTOMY TUBE;  Surgeon: Dos Diagnostic Provider;  [facility];  Service: Anesthesiology;  Laterality: N/A;  189    REPLACEMENT OF NEPHROSTOMY TUBE N/A 11/25/2019    Procedure: REPLACEMENT, NEPHROSTOMY TUBE;  Surgeon: Dos Diagnostic Provider;  [facility];  Service: Anesthesiology;  Laterality: N/A;  Room 188/Bessy    REPLACEMENT OF NEPHROSTOMY TUBE Right 2/19/2020    Procedure: REPLACEMENT, NEPHROSTOMY TUBE removal removal;  Surgeon: Robin Boyd MD;  [facility];  Service: Urology;  Laterality: Right;    rt elbow  surgery      S/P LAD COATED STENT  05/14/2010    6 total     S/P OM1 STENT  08/2003    SINUS SURGERY      F.E.S.S.    TRACHEOSTOMY N/A 5/2/2019    Procedure: CREATION, TRACHEOSTOMY;  Surgeon: Sean Ruano MD;  Location: [facility] OR [address] [place] [address];  Service: General;  Laterality: N/A;    TUBAL LIGATION      URETERAL REIMPLANTION Left 2/19/2020    Procedure: REIMPLANTATION, URETER WITH PSOAS HITCH;  Surgeon: Robin Boyd MD;  Location: [facility] OR [address] [place] [address];  Service: Urology;  Laterality: Left;       SOCIAL HISTORY:   Social History     Socioeconomic History    Marital status:      Spouse name: Shamir    Number of children: 2    Years of education: Not on file    Highest education level: Not on file   Occupational History    Occupation: [employer]     Employer: [employer]     Employer: [place] [employer]     Employer: [place] [employer]   Social Needs    Financial resource strain: Not on file    Food insecurity     Worry: Not on file     Inability: Not on file    Transportation needs     Medical: Not on file     Non-medical: Not on file   Tobacco Use    Smoking status: Never Smoker    Smokeless tobacco: Never Used   Substance and Sexual Activity    Alcohol use: No     Alcohol/week: 0.0 standard drinks    Drug use: No    Sexual activity: Yes     Partners: Male     Birth control/protection: Post-menopausal     Comment:    Lifestyle    Physical activity     Days per week: Not on file     Minutes per session: Not on file    Stress: Not on file   Relationships    Social connections     Talks on phone: Not on file     Gets together: Not on file     Attends [employer]ical service: Not on file     Active member of club or organization: Not on file     Attends meetings of clubs or organizations: Not on file     Relationship status: Not on file   Other Topics Concern    Are you pregnant or think you may be? No    Breast-feeding No   Social History Narrative    . 2 children.         FAMILY HISTORY:  Family History   Problem Relation Age of Onset    Diabetes Mother     Heart disease Mother     Diabetes Father     Leukemia Father         leukemia    Heart attack Father     Diabetes Sister     Diabetes Brother     Diabetes Sister     No Known Problems Sister     No Known Problems Brother     No Known Problems Brother     No Known Problems Maternal Grandmother     No Known Problems Maternal Grandfather     No Known Problems Paternal Grandmother     No Known Problems Paternal Grandfather     No Known Problems Son     No Known Problems Son     No Known Problems Maternal Aunt     No Known Problems Maternal Uncle     No Known Problems Paternal Aunt     No Known Problems Paternal Uncle     Colon cancer Neg Hx     Inflammatory bowel disease Neg Hx     Melanoma Neg Hx     Psoriasis Neg Hx     Lupus Neg Hx     Eczema Neg Hx     Acne Neg Hx     Amblyopia Neg Hx     Blindness Neg Hx     Cancer Neg Hx     Cataracts Neg Hx     Glaucoma Neg Hx     Hypertension Neg Hx     Macular degeneration Neg Hx     Retinal detachment Neg Hx     Strabismus Neg Hx     Stroke Neg Hx     Thyroid disease Neg Hx     Heart failure Neg Hx     Hyperlipidemia Neg Hx        CURRENTS MEDICATIONS:  Current Outpatient Medications on File Prior to Visit   Medication Sig Dispense Refill    ACCU-CHEK EDIN PLUS METER Misc       albuterol (PROVENTIL/VENTOLIN HFA) 90 mcg/actuation inhaler Inhale 2 puffs into the lungs every 6 (six) hours as needed for Wheezing. 1 g 3    albuterol-ipratropium (DUO-NEB) 2.5 mg-0.5 mg/3 mL nebulizer solution Inhale 3 mLs into the lungs.      amLODIPine (NORVASC) 10 MG tablet TAKE 1 TABLET BY MOUTH EVERY DAY 90 tablet 2    aspirin 81 mg Tab Take 81 mg by mouth. 1 Tablet Oral Every day      atorvastatin (LIPITOR) 80 MG tablet 1 tablet (80 mg total) by Per G Tube route once daily. 90 tablet 3    blood sugar diagnostic (ACCU-CHEK EDIN PLUS TEST STRP) Strp TEST  BLOOD SUGARS 4 TIMES DAILY 150 strip 11    blood-glucose meter,continuous (DEXCOM G6 ) Misc 1 each by Misc.(Non-Drug; Combo Route) route continuous 1 each PRN    blood-glucose sensor (DEXCOM G6 SENSOR) Leann 3 each by Misc.(Non-Drug; Combo Route) route continuous. 3 each PRN    blood-glucose transmitter (DEXCOM G6 TRANSMITTER) Leann 1 each by Misc.(Non-Drug; Combo Route) route continuous 1 each PRN    ciprofloxacin HCl (CIPRO) 500 MG tablet Take 1 tablet (500 mg total) by mouth every 12 (twelve) hours. for 10 days 20 tablet 0    clopidogreL (PLAVIX) 75 mg tablet TAKE 1 TABLET BY MOUTH EVERY DAY 90 tablet 3    dulaglutide (TRULICITY) 1.5 mg/0.5 mL pen injector Inject 1.5 mg into the skin once a week. 12 pen 3    escitalopram oxalate (LEXAPRO) 10 MG tablet Take 1 tablet (10 mg total) by mouth once daily. 90 tablet 3    eszopiclone (LUNESTA) 2 MG Tab TAKE 1 TABLET (2 MG TOTAL) BY MOUTH EVERY EVENING. 30 tablet 0    furosemide (LASIX) 40 MG tablet Take 1 tablet (40 mg total) by mouth once daily. 90 tablet 3    insulin aspart U-100 (NOVOLOG FLEXPEN U-100 INSULIN) 100 unit/mL (3 mL) InPn pen Inject 8 Units into the skin 3 (three) times daily with meals. 6 mL 5    insulin glargine U-300 conc (TOUJEO MAX U-300 SOLOSTAR) 300 unit/mL (3 mL) InPn Inject 22 Units into the skin every evening. 6 mL 5    insulin lispro 100 unit/mL injection To use with Vgo 20 with 3 clicks with meals. Max TDD (total daily dose) 80 20 mL 3    isosorbide mononitrate (ISMO,MONOKET) 10 mg tablet Take 1 tablet (10 mg total) by mouth once daily. 30 tablet 6    losartan (COZAAR) 100 MG tablet TAKE 1 TABLET BY MOUTH EVERY DAY 90 tablet 3    metroNIDAZOLE (FLAGYL) 500 MG tablet Take 1 tablet (500 mg total) by mouth 3 (three) times daily. for 10 days 30 tablet 0    multivitamin (THERAGRAN) per tablet Take 1 tablet by mouth once daily.      nitroGLYCERIN (NITROSTAT) 0.4 MG SL tablet Take one every 2-3 min till chestpain relief for 3  "times and if still no relief, call MD or come to ED 30 tablet 11    ondansetron (ZOFRAN-ODT) 8 MG TbDL Take 1 tablet (8 mg total) by mouth every 12 (twelve) hours as needed. 30 tablet 2    ondansetron (ZOFRAN-ODT) 8 MG TbDL Take 1 tablet (8 mg total) by mouth every 12 (twelve) hours as needed. 30 tablet 2    pen needle, diabetic (NOVOTWIST) 32 gauge x 1/5" Ndle Use 1 needle to inject insulin four times daily 400 each 3    polyethylene glycol (GLYCOLAX) 17 gram PwPk Mix 1 capful (17 g) in liquid and take by mouth once daily. 14 each 0    potassium chloride SA (K-DUR,KLOR-CON) 20 MEQ tablet Take 1 tablet (20 mEq total) by mouth 2 (two) times daily. 60 tablet 11    sub-q insulin device, 20 unit (VGO 20) Leann 1 Device by Misc.(Non-Drug; Combo Route) route once daily. 30 Device 3    [DISCONTINUED] oxyCODONE-acetaminophen (PERCOCET) 5-325 mg per tablet Take 1 tablet by mouth every 8 (eight) hours as needed for Pain. 28 tablet 0    famotidine (PEPCID) 20 MG tablet Take 1 tablet (20 mg total) by mouth 2 (two) times daily. 60 tablet 11    gabapentin (NEURONTIN) 300 MG capsule Take 1 capsule (300 mg total) by mouth 2 (two) times daily. 60 capsule 11    metoprolol tartrate (LOPRESSOR) 25 MG tablet Take 1 tablet (25 mg total) by mouth 2 (two) times daily. 60 tablet 11     Current Facility-Administered Medications on File Prior to Visit   Medication Dose Route Frequency Provider Last Rate Last Dose    lidocaine (PF) 10 mg/ml (1%) injection 10 mg  1 mL Intradermal Once Dave Bentley Jr., MD           ALLERGIES:  Review of patient's allergies indicates:   Allergen Reactions    Milk containing products      Causes GI distress       REVIEW OF SYSTEMS:  Review of Systems   Constitutional: Negative for diaphoresis, fever and weight loss.   Respiratory: Negative for shortness of breath.    Cardiovascular: Negative for chest pain.   Gastrointestinal: Negative for blood in stool.   Genitourinary: Negative for hematuria. "   Endo/Heme/Allergies: Does not bruise/bleed easily.   All other systems reviewed and are negative.        OBJECTIVE:    PHYSICAL EXAMINATION:   Vitals:    11/16/20 1036   BP: (!) 170/80   Pulse: 83       Physical Exam:  Vitals reviewed.    Constitutional: She appears well-developed and well-nourished.     Eyes: Pupils are equal, round, and reactive to light. Conjunctivae and EOM are normal.     Cardiovascular: Normal distal pulses and no edema.     Abdominal: Soft.     Skin: Skin displays no rash on trunk and no rash on extremities. Skin displays no lesions on trunk and no lesions on extremities.     Psych/Behavior: She is alert. She is oriented to person, place, and time. She has a normal mood and affect.     Musculoskeletal:        Neck: Range of motion is full.     Neurological:        DTRs: Tricep reflexes are 2+ on the right side and 2+ on the left side. Bicep reflexes are 2+ on the right side and 2+ on the left side. Brachioradialis reflexes are 2+ on the right side and 2+ on the left side. Patellar reflexes are 2+ on the right side and 2+ on the left side. Achilles reflexes are 2+ on the right side and 2+ on the left side.       Back Exam     Tenderness   The patient is experiencing tenderness in the sacroiliac.    Range of Motion   Extension: abnormal   Flexion: abnormal   Lateral bend right: abnormal   Lateral bend left: abnormal   Rotation right: abnormal   Rotation left: abnormal     Muscle Strength   Right Quadriceps:  5/5   Left Quadriceps:  5/5   Right Hamstrings:  5/5   Left Hamstrings:  5/5     Tests   Straight leg raise right: negative  Straight leg raise left: negative    Other   Toe walk: normal  Heel walk: normal            SI joint:   Palpation at the right SI joint is painful  FORD test is positive on the right side  Gaenslen test is positive on the right side  Thigh thrust test is positive on the right side  Poor hip flexibility in flexion and abduction and external rotation bilaterally but  worse on the right side    Neurologic Exam     Mental Status   Oriented to person, place, and time.   Speech: speech is normal   Level of consciousness: alert    Cranial Nerves   Cranial nerves II through XII intact.     CN III, IV, VI   Pupils are equal, round, and reactive to light.  Extraocular motions are normal.     Motor Exam   Muscle bulk: normal  Overall muscle tone: normal    Strength   Right deltoid: 5/5  Left deltoid: 5/5  Right biceps: 5/5  Left biceps: 5/5  Right triceps: 5/5  Left triceps: 5/5  Right wrist flexion: 5/5  Left wrist flexion: 5/5  Right wrist extension: 5/5  Left wrist extension: 5/5  Right interossei: 5/5  Left interossei: 5/5  Right iliopsoas: 5/5  Left iliopsoas: 5/5  Right quadriceps: 5/5  Left quadriceps: 5/5  Right hamstrin/5  Left hamstrin/5  Right anterior tibial: 5/5  Left anterior tibial: 5/5  Right posterior tibial: 5/5  Left posterior tibial: 5/5  Right peroneal: 5/5  Left peroneal: 5/5  Right gastroc: 5/5  Left gastroc: 5/5    Sensory Exam   Light touch normal.   Pinprick normal.     Gait, Coordination, and Reflexes     Gait  Gait: normal    Coordination   Finger to nose coordination: normal  Tandem walking coordination: normal    Reflexes   Right brachioradialis: 2+  Left brachioradialis: 2+  Right biceps: 2+  Left biceps: 2+  Right triceps: 2+  Left triceps: 2+  Right patellar: 2+  Left patellar: 2+  Right achilles: 2+  Left achilles: 2+  Right plantar: normal  Left plantar: normal  Right Crump: absent  Left Crump: absent  Right ankle clonus: absent  Left ankle clonus: absent        DIAGNOSTIC DATA:  I personally interpreted the following imaging:   Most recent lumbar x-ray shows consolidation of the fusion at L5-S1    ASSESMENT:  This is a 60 y.o. female with     Problem List Items Addressed This Visit     None      Visit Diagnoses     Sacroiliac joint dysfunction of right side    -  Primary    Relevant Orders    CT Pelvis Without Contrast    Status post lumbar  spinal fusion        Relevant Orders    CT Pelvis Without Contrast            PLAN:  I explained the natural history of the disease and all treatment options. I recommended a right SI joint block and steroid injection to diagnose and treat her right SI joint dysfunction.  She already did more than 6 weeks of physical therapy for SI joint dysfunction      We have discussed the risks of surgery including death, coma, bleeding, infection, failure of surgery, CSF leak, nerve root injury, spinal cord injury, ureter injury, weakness, paralysis, peripheral neuropathy, need for reoperation. Patient understands the risks and would like to proceed with surgery.      The patient has increase perioperative risks because of these comorbidities:  Type 2 diabetes.         Mk George MD  Cell:[phone]

## 2020-11-16 NOTE — TELEPHONE ENCOUNTER
----- Message from Era Hsu sent at 11/16/2020  1:59 PM CST -----  Regarding: Antibiotic is interacting with pain meds  Contact: Jenelle pharmacist at [facility] [phone]  Ms. Almanzar, The pharmacist at [facility] called and said that the antibiotic Cipro is interacting with oxycodone 10 mg (pain meds) for the patient Ms. Huff.

## 2020-11-17 NOTE — TELEPHONE ENCOUNTER
Please have patient take half a dose of the oxycodone instead the full dose.  Patient also needs to schedule the CT scan.

## 2020-11-18 ENCOUNTER — ANESTHESIA EVENT (OUTPATIENT)
Dept: SURGERY | Facility: HOSPITAL | Age: 60
End: 2020-11-18
Payer: COMMERCIAL

## 2020-11-25 ENCOUNTER — HOSPITAL ENCOUNTER (OUTPATIENT)
Dept: RADIOLOGY | Facility: HOSPITAL | Age: 60
Discharge: HOME | End: 2020-11-25
Attending: INTERNAL MEDICINE
Payer: COMMERCIAL

## 2020-11-25 DIAGNOSIS — R10.32 LLQ PAIN: ICD-10-CM

## 2020-11-25 PROCEDURE — 74177 CT ABDOMEN PELVIS WITH CONTRAST: ICD-10-PCS | Mod: 26,,, | Performed by: RADIOLOGY

## 2020-11-25 PROCEDURE — 74177 CT ABD & PELVIS W/CONTRAST: CPT | Mod: TC

## 2020-11-25 PROCEDURE — 25500020 PHARM REV CODE 255: Performed by: INTERNAL MEDICINE

## 2020-11-25 PROCEDURE — 74177 CT ABD & PELVIS W/CONTRAST: CPT | Mod: 26,,, | Performed by: RADIOLOGY

## 2020-11-25 RX ADMIN — IOHEXOL 15 ML: 350 INJECTION, SOLUTION INTRAVENOUS at 10:11

## 2020-11-25 RX ADMIN — IOHEXOL 15 ML: 350 INJECTION, SOLUTION INTRAVENOUS at 11:11

## 2020-11-25 RX ADMIN — IOHEXOL 75 ML: 350 INJECTION, SOLUTION INTRAVENOUS at 12:11

## 2020-12-01 ENCOUNTER — HOSPITAL ENCOUNTER (OUTPATIENT)
Dept: PREADMISSION TESTING | Facility: HOSPITAL | Age: 60
Discharge: HOME | End: 2020-12-01
Attending: NEUROLOGICAL SURGERY
Payer: COMMERCIAL

## 2020-12-01 VITALS
WEIGHT: 162 LBS | DIASTOLIC BLOOD PRESSURE: 74 MMHG | SYSTOLIC BLOOD PRESSURE: 146 MMHG | OXYGEN SATURATION: 99 % | RESPIRATION RATE: 18 BRPM | BODY MASS INDEX: 27.66 KG/M2 | HEIGHT: 64 IN | HEART RATE: 80 BPM

## 2020-12-01 RX ORDER — SODIUM CHLORIDE, SODIUM LACTATE, POTASSIUM CHLORIDE, CALCIUM CHLORIDE 600; 310; 30; 20 MG/100ML; MG/100ML; MG/100ML; MG/100ML
INJECTION, SOLUTION INTRAVENOUS CONTINUOUS
Status: CANCELLED | OUTPATIENT
Start: 2020-12-01

## 2020-12-01 NOTE — ANESTHESIA PREPROCEDURE EVALUATION
2020  Mariann Huff is a 60 y.o., female scheduled for right SI joint block and steroid injection on 12/10/2020.    Past Medical History:   Diagnosis Date    Anticoagulant long-term use     Asthma     Back pain     Bradycardia, unspecified 2019    The etiology of the bradycardic episode is unclear.  The have appear to be respiratory in origin (at least the 1st episode).  We will continue to monitor carefully.  We are awaiting evaluation by Cardiology.     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, with long-term current use of insulin 2020     Past Surgical History:   Procedure Laterality Date    ANTEGRADE NEPHROSTOGRAPHY Left 2019    Procedure: Nephrostogram - antegrade;  Surgeon: Robin Boyd MD;  Location: [address];  Service: Urology;  Laterality: Left;    BRONCHOSCOPY N/A 2019    Procedure: BRONCHOSCOPY;  Surgeon: Sean Ruano MD;  Location: [address];  Service: General;  Laterality: N/A;    CARDIAC CATHETERIZATION      CATARACT EXTRACTION      cataract extraction left eye      cataracts      CAUDAL EPIDURAL STEROID INJECTION N/A 2019    Procedure: Injection-steroid-epidural-caudal;  Surgeon: Dave Bentley Jr., MD;  Location: [facility];  Service: Pain Management;  Laterality: N/A;     SECTION,  LOW TRANSVERSE      COLONOSCOPY N/A 5/24/2019    Procedure: COLONOSCOPY;  Surgeon: Al Alaniz MD;  Location: [facility] ENDO (2ND FLR);  Service: Endoscopy;  Laterality: N/A;    CORONARY ANGIOPLASTY      CYSTOGRAM N/A 12/11/2019    Procedure: CYSTOGRAM INTRAOP;  Surgeon: Robin Boyd MD;  [facility];  Service: Urology;  Laterality: N/A;  1 HOUR    CYSTOSCOPY W/ RETROGRADES Left 12/11/2019    Procedure: CYSTOSCOPY, WITH RETROGRADE PYELOGRAM;  Surgeon: Robin Boyd MD;  [facility];  Service: Urology;  Laterality: Left;  fluro    ESOPHAGOGASTRODUODENOSCOPY W/ PEG  5/2/2019    Procedure: EGD, WITH PEG TUBE INSERTION;  Surgeon: Sean Ruano MD;  [facility];  Service: General;;    EXCISION TURBINATE, SUBMUCOUS      FLEXIBLE SIGMOIDOSCOPY N/A 5/13/2019    Procedure: SIGMOIDOSCOPY, FLEXIBLE;  Surgeon: ALBERTO Amin MD;  [facility]  Service: Endoscopy;  Laterality: N/A;    FLEXIBLE SIGMOIDOSCOPY N/A 5/21/2019    Procedure: SIGMOIDOSCOPY, FLEXIBLE;  Surgeon: ALBERTO Amin MD;  [facility]  Service: Endoscopy;  Laterality: N/A;    FUSION OF LUMBAR SPINE BY ANTERIOR APPROACH Left 4/12/2019    Procedure: FUSION, SPINE, LUMBAR, ANTERIOR APPROACH Left L5-S1 Anterior to Psoa Interbody Fusion, L5-S1 Posterior Instrumentation;  Surgeon: Mk George MD;  Location: [address];  Service: Neurosurgery;  Laterality: Left;  Porcedure:  Left L5-S1 Anterior to Psoa Interbody Fusion, L5-S1 Posterior Instrumentation  Surgery Time: 4 Hrs  LOS: 2-3  Anesthesia: General   Blood: Type & Screen  R    HAND SURGERY Left     HAND SURGERY Right     torn ligament repair/ Dr. Yeboah    HYSTERECTOMY      left foot surgery      left wrist surgery      LYSIS OF ADHESIONS N/A 2/19/2020    Procedure: LYSIS, ADHESIONS;  Surgeon: Robin Boyd MD;  Location: [facility] OR [address] [place] [address];  Service: Urology;  Laterality: N/A;    NASAL SEPTUM SURGERY   5/7/15    PERCUTANEOUS NEPHROSTOMY Left 4/21/2019    Procedure: Creation, Nephrostomy, Percutaneous;  Surgeon: Karina Surgeon;  Location: [facility];  Service: Anesthesiology;  Laterality: Left;    REPAIR OF URETER  4/12/2019    Procedure: REPAIR, URETER;  Surgeon: Mk George MD;  [facility];  Service: Neurosurgery;;    REPLACEMENT OF NEPHROSTOMY TUBE N/A 7/18/2019    Procedure: REPLACEMENT, NEPHROSTOMY TUBE;  Surgeon: [facility] Diagnostic Provider;  [facility];  Service: Anesthesiology;  Laterality: N/A;  188    REPLACEMENT OF NEPHROSTOMY TUBE N/A 7/24/2019    Procedure: REPLACEMENT, NEPHROSTOMY TUBE;  Surgeon: [facility] Diagnostic Provider;  [facility];  Service: Anesthesiology;  Laterality: N/A;  188    REPLACEMENT OF NEPHROSTOMY TUBE N/A 10/7/2019    Procedure: REPLACEMENT, NEPHROSTOMY TUBE;  Surgeon: [facility] Diagnostic Provider;  [facility];  Service: Anesthesiology;  Laterality: N/A;  189    REPLACEMENT OF NEPHROSTOMY TUBE N/A 11/25/2019    Procedure: REPLACEMENT, NEPHROSTOMY TUBE;  Surgeon: [facility] Diagnostic Provider;  [facility];  Service: Anesthesiology;  Laterality: N/A;  Room 188/Bessy    REPLACEMENT OF NEPHROSTOMY TUBE Right 2/19/2020    Procedure: REPLACEMENT, NEPHROSTOMY TUBE removal removal;  Surgeon: Robin Boyd MD;  Location: [address];  Service: Urology;  Laterality: Right;    rt elbow surgery      S/P LAD COATED STENT  05/14/2010    6 total     S/P OM1 STENT  08/2003    SINUS SURGERY      F.E.S.S.    TRACHEOSTOMY N/A 5/2/2019    Procedure: CREATION, TRACHEOSTOMY;  Surgeon: Sean Ruano MD;  [facility];  Service: General;  Laterality: N/A;    TUBAL LIGATION      URETERAL REIMPLANTION Left 2/19/2020    Procedure: REIMPLANTATION, URETER WITH PSOAS HITCH;  Surgeon: Robin Boyd MD;  Location: [facility] OR [address] [place] [address];  Service: Urology;  Laterality: Left;       Anesthesia Evaluation    I have reviewed  the Patient Summary Reports.    I have reviewed the Nursing Notes. I have reviewed the NPO Status.   I have reviewed the Medications.     Review of Systems  Anesthesia Hx:  No problems with previous Anesthesia  History of prior surgery of interest to airway management or planning: tracheostomy. Denies Family Hx of Anesthesia complications.    Social:  Non-Smoker, No Alcohol Use    Hematology/Oncology:        Hematology Comments: Cleared to hold Plavix and ASA x 5 days   Cardiovascular:   Hypertension Past MI (2013) CAD    Denies Angina. CHF     ECG has been reviewed.    Pulmonary:   Asthma Sleep Apnea    Renal/:   Chronic Renal Disease, CRI H/o ureteral injury requiring left ureteral implant   Hepatic/GI:   GERD Liver Disease, (NAFLD)    Musculoskeletal:  Spine Disorders: lumbar Degenerative disease    Neurological:  Neurology Normal    Endocrine:   Diabetes, type 2, using insulin        Physical Exam  General:  Well nourished    Airway/Jaw/Neck:  Airway Findings: Mouth Opening: Normal Mallampati: III       Chest/Lungs:  Chest/Lungs Findings: Clear to auscultation, Normal Respiratory Rate     Heart/Vascular:  Heart Findings: Rate: Normal  Rhythm: Regular Rhythm        Mental Status:  Mental Status Findings:  Cooperative, Alert and Oriented       EKG 2/12/2020  Normal sinus rhythm   Nonspecific T wave abnormality   Abnormal ECG   When compared with ECG of 28-OCT-2019 18:31,   Vent. rate has decreased BY  36 BPM   Borderline criteria for Anterior infarct are no longer Present    Stress Echo 2/14/2020  · Normal left ventricular systolic function. The estimated ejection fraction is 55%.  · Normal right ventricular systolic function.  · Indeterminate left ventricular diastolic function.  · Normal central venous pressure (3 mmHg).  · The ECG portion of this study is negative for myocardial ischemia.  · The stress echo portion of this study is negative for myocardial ischemia.  · Overall, this study is negative for  myocardial ischemia.    TTE 9/3/2020  · Normal left ventricular systolic function. The estimated ejection fraction is 55%.  · Normal LV diastolic function.  · Normal right ventricular systolic function.  · The estimated PA systolic pressure is 17 mmHg.  · Normal central venous pressure (3 mmHg).      Anesthesia Plan  Type of Anesthesia, risks & benefits discussed:  Anesthesia Type:  MAC, general  Patient's Preference:   Intra-op Monitoring Plan: standard ASA monitors  Intra-op Monitoring Plan Comments:   Post Op Pain Control Plan: multimodal analgesia  Post Op Pain Control Plan Comments:   Induction:   IV  Beta Blocker:         Informed Consent: Patient understands risks and agrees with Anesthesia plan.  Questions answered. Anesthesia consent signed with patient.  ASA Score: 3     Day of Surgery Review of History & Physical:  There are no significant changes.      Anesthesia Plan Notes:           Ready For Surgery From Anesthesia Perspective.

## 2020-12-01 NOTE — DISCHARGE INSTRUCTIONS
Your surgery is scheduled for 12/10/2020    [facility]    THIS TIME IS SUBJECT TO CHANGE.  YOU WILL RECEIVE A PHONE CALL THE DAY BEFORE SURGERY BY 3:30 PM TO CONFIRM YOUR TIME OF ARRIVAL.  IF YOU HAVE NOT RECEIVED A PHONE CALL BY 3:30 PM THE DAY BEFORE YOUR SURGERY PLEASE CALL [phone]     INSTRUCTIONS IMPORTANT!!!  ¨ Do not eat or drink after 12 midnight-including water. OK to brush teeth, no   gum, candy or mints!            ____  Do not wear makeup, including mascara.  ____  No powder, lotions or creams to surgical area.  ____  Please remove all jewelry, including piercings and leave at home.  ____  No money or valuables needed. Please leave at home.  ____  Please bring any documents given by your doctor.  ____  If going home the same day, arrange for a ride home. You will not be able to             drive if Anesthesia was used.  ____  Children under 18 years require a parent / guardian present the entire time             they are in surgery / recovery.  ____  Wear loose fitting clothing. Allow for dressings, bandages.  ____  Stop Aspirin, Ibuprofen, Motrin and Aleve at least 3-5 days before surgery, unless otherwise instructed by your doctor, or the nurse.   You MAY use Tylenol/acetaminophen until day of surgery.  ____  Wash the surgical area with Hibiclens the night before surgery, and again the             morning of surgery.  Be sure to rinse hibiclens off completely (if instructed by   nurse).  ____  If you take diabetic medication, do not take am of surgery unless instructed by Doctor.  ____  Call MD for temperature above 101 degrees or any other signs of infection such as Urinary (bladder) infection, Upper respiratory infection, skin boils, etc.  ____ Stop taking any Fish Oil supplement or any Vitamins that contain Vitamin E at least 5 days prior to surgery.  ____ Do Not wear your contact lenses the day of your procedure.  You may wear your glasses.       ____Do not shave surgical site for 3 days prior to surgery.  ____ Practice Good hand washing before, during, and after procedure.      I have read or had read and explained to me, and understand the above information.  Additional comments or instructions:  For additional questions call [phone]      ANESTHESIA SIDE EFFECTS  -For the first 24 hours after surgery:  Do not drive, use heavy equipment, make important decisions, or drink alcohol  -It is normal to feel sleepy for several hours.  Rest until you are more awake.  -Have someone stay with you, if needed.  They can watch for problems and help keep you safe.  -Some possible post anesthesia side effects include: nausea and vomiting, sore throat and hoarseness, sleepiness, and dizziness.        Pre-Op Bathing Instructions    Before surgery, you can play an important role in your own health.    Because skin is not sterile, we need to be sure that your skin is as free of germs as possible. By following the instructions below, you can reduce the number of germs on your skin before surgery.    IMPORTANT: You will need to shower with a special soap called Hibiclens*, available at any pharmacy.  If you are allergic to Chlorhexidine (the antiseptic in Hibiclens), use an antibacterial soap such as Dial Soap for your preoperative shower.  You will shower with Hibiclens both the night before your surgery and the morning of your surgery.  Do not use Hibiclens on the head, face or genitals to avoid injury to those areas.    STEP #1: THE NIGHT BEFORE YOUR SURGERY     1. Do not shave the area of your body where your surgery will be performed.  2. Shower and wash your hair and body as usual with your normal soap and shampoo.  3. Rinse your hair and body thoroughly after you shower to remove all soap residue.  4. With your hand, apply one packet of Hibiclens soap to the surgical site.   5. Wash the site gently for five (5) minutes. Do not scrub your skin too hard.   6. Do not  wash with your regular soap after Hibiclens is used.  7. Rinse your body thoroughly.  8. Pat yourself dry with a clean, soft towel.  9. Do not use lotion, cream, or powder.  10. Wear clean clothes.    STEP #2: THE MORNING OF YOUR SURGERY     1. Repeat Step #1.    * Not to be used by people allergic to Chlorhexidine.              Anesthesia: Monitored Anesthesia Care (MAC)    Youre due to have surgery. During surgery, youll be given medicine called anesthesia. This will keep you comfortable and pain-free. Your surgeon will use monitored anesthesia care (MAC). This sheet tells you more about this type of anesthesia.  What is monitored anesthesia care?  MAC keeps you very drowsy during surgery. You may be awake, but you will likely not remember much. And you wont feel pain. With MAC, medicines are given through an IV line into a vein in your arm or hand. A local anesthetic will usually be injected into the skin and muscle around the surgical site to numb it. The anesthesia provider monitors you during the procedure. He or she checks your heart rate and rhythm, blood pressure, and blood oxygen level.  Anesthesia tools and medicines that may be near you during your procedure  You will likely have:  · A pulse oximeter on the end of your finger. This measures your blood oxygen level.  · Electrocardiography leads (electrodes) on your chest. These record your heart rate and rhythm.  · Medicines given through an IV. These relax you and prevent pain. You may be awake or sleep lightly. If you have local anesthetic, it is injected directly into your skin.  · A facemask to give you oxygen, if needed.  Risks and possible complications  MAC has some risks. These include:  · Breathing problems  · Nausea and vomiting  · Allergic reaction to the anesthetic    Anesthesia safety  Tips for anesthesia safety include the following:   · Follow all instructions you are given for how long not to eat or drink before your procedure.  · Be  sure your healthcare provider knows what medicines you take, especially any anti-inflammatory medicine or blood thinners. This includes aspirin and any other over-the-counter medicines, herbs, and supplements.  · Have an adult family member or friend drive you home after the procedure.  · For the first 24 hours after your surgery:  ¨ Do not drive or use heavy equipment.  ¨ Do not make important decisions or sign documents.  ¨ Avoid alcohol.  ¨ Have someone stay with you, if possible. They can watch for problems and help keep you safe.  Date Last Reviewed: 12/1/2016  © 7088-8799 [employer]. [address] [place] [address], [place] [address]. All rights reserved. This information is not intended as a substitute for professional medical care. Always follow your healthcare professional's instructions.

## 2020-12-01 NOTE — PLAN OF CARE
Allergies, medical, surgical, family and psychosocial histories reviewed with patient.  Periop plan of care discussed. Preop instructions given, including medications to take and to hold. Time given for questions and pt voiced understanding.

## 2020-12-04 ENCOUNTER — TELEPHONE (OUTPATIENT)
Dept: NEUROSURGERY | Facility: CLINIC | Age: 60
End: 2020-12-04

## 2020-12-07 ENCOUNTER — LAB VISIT (OUTPATIENT)
Dept: FAMILY MEDICINE | Facility: CLINIC | Age: 60
End: 2020-12-07
Payer: COMMERCIAL

## 2020-12-07 DIAGNOSIS — Z41.9 SURGERY, ELECTIVE: ICD-10-CM

## 2020-12-07 PROCEDURE — U0003 INFECTIOUS AGENT DETECTION BY NUCLEIC ACID (DNA OR RNA); SEVERE ACUTE RESPIRATORY SYNDROME CORONAVIRUS 2 (SARS-COV-2) (CORONAVIRUS DISEASE [COVID-19]), AMPLIFIED PROBE TECHNIQUE, MAKING USE OF HIGH THROUGHPUT TECHNOLOGIES AS DESCRIBED BY CMS-2020-01-R: HCPCS

## 2020-12-08 LAB — SARS-COV-2 RNA RESP QL NAA+PROBE: NOT DETECTED

## 2020-12-09 ENCOUNTER — TELEPHONE (OUTPATIENT)
Dept: NEUROSURGERY | Facility: CLINIC | Age: 60
End: 2020-12-09

## 2020-12-09 NOTE — TELEPHONE ENCOUNTER
----- Message from Mk George MD sent at 12/8/2020  5:16 PM CST -----  Let her know that I have reviewed all her most recent imaging including thoracic spine MRI.  This was ordered for preoperative planning.  We can proceed with surgery.

## 2020-12-10 ENCOUNTER — ANESTHESIA (OUTPATIENT)
Dept: SURGERY | Facility: HOSPITAL | Age: 60
End: 2020-12-10
Payer: COMMERCIAL

## 2020-12-10 ENCOUNTER — HOSPITAL ENCOUNTER (OUTPATIENT)
Facility: HOSPITAL | Age: 60
Discharge: HOME | End: 2020-12-10
Attending: NEUROLOGICAL SURGERY | Admitting: NEUROLOGICAL SURGERY
Payer: COMMERCIAL

## 2020-12-10 VITALS
HEART RATE: 75 BPM | SYSTOLIC BLOOD PRESSURE: 174 MMHG | DIASTOLIC BLOOD PRESSURE: 83 MMHG | OXYGEN SATURATION: 99 % | RESPIRATION RATE: 16 BRPM | TEMPERATURE: 98 F

## 2020-12-10 DIAGNOSIS — M53.3 SI (SACROILIAC) JOINT DYSFUNCTION: Primary | ICD-10-CM

## 2020-12-10 LAB — POCT GLUCOSE: 185 MG/DL (ref 70–110)

## 2020-12-10 PROCEDURE — 36000705 HC OR TIME LEV I EA ADD 15 MIN: Performed by: NEUROLOGICAL SURGERY

## 2020-12-10 PROCEDURE — 71000015 HC POSTOP RECOV 1ST HR: Performed by: NEUROLOGICAL SURGERY

## 2020-12-10 PROCEDURE — 63600175 PHARM REV CODE 636 W HCPCS: Performed by: NURSE ANESTHETIST, CERTIFIED REGISTERED

## 2020-12-10 PROCEDURE — 37000008 HC ANESTHESIA 1ST 15 MINUTES: Performed by: NEUROLOGICAL SURGERY

## 2020-12-10 PROCEDURE — 25000003 PHARM REV CODE 250: Performed by: NURSE ANESTHETIST, CERTIFIED REGISTERED

## 2020-12-10 PROCEDURE — 63600175 PHARM REV CODE 636 W HCPCS: Performed by: NURSE PRACTITIONER

## 2020-12-10 PROCEDURE — 63600175 PHARM REV CODE 636 W HCPCS: Performed by: NEUROLOGICAL SURGERY

## 2020-12-10 PROCEDURE — 36000704 HC OR TIME LEV I 1ST 15 MIN: Performed by: NEUROLOGICAL SURGERY

## 2020-12-10 PROCEDURE — 25500020 PHARM REV CODE 255: Performed by: NEUROLOGICAL SURGERY

## 2020-12-10 PROCEDURE — 25000003 PHARM REV CODE 250: Performed by: NEUROLOGICAL SURGERY

## 2020-12-10 PROCEDURE — 37000009 HC ANESTHESIA EA ADD 15 MINS: Performed by: NEUROLOGICAL SURGERY

## 2020-12-10 PROCEDURE — 27096 PR INJECTION,SACROILIAC JOINT: ICD-10-PCS | Mod: RT,,, | Performed by: NEUROLOGICAL SURGERY

## 2020-12-10 PROCEDURE — 27096 INJECT SACROILIAC JOINT: CPT | Mod: RT,,, | Performed by: NEUROLOGICAL SURGERY

## 2020-12-10 RX ORDER — ONDANSETRON 2 MG/ML
INJECTION INTRAMUSCULAR; INTRAVENOUS
Status: DISCONTINUED | OUTPATIENT
Start: 2020-12-10 | End: 2020-12-10

## 2020-12-10 RX ORDER — SODIUM CHLORIDE, SODIUM LACTATE, POTASSIUM CHLORIDE, CALCIUM CHLORIDE 600; 310; 30; 20 MG/100ML; MG/100ML; MG/100ML; MG/100ML
INJECTION, SOLUTION INTRAVENOUS CONTINUOUS
Status: DISCONTINUED | OUTPATIENT
Start: 2020-12-10 | End: 2020-12-10 | Stop reason: HOSPADM

## 2020-12-10 RX ORDER — PROPOFOL 10 MG/ML
INJECTION, EMULSION INTRAVENOUS
Status: DISCONTINUED | OUTPATIENT
Start: 2020-12-10 | End: 2020-12-10

## 2020-12-10 RX ORDER — LIDOCAINE HCL/PF 100 MG/5ML
SYRINGE (ML) INTRAVENOUS
Status: DISCONTINUED | OUTPATIENT
Start: 2020-12-10 | End: 2020-12-10

## 2020-12-10 RX ORDER — FENTANYL CITRATE 50 UG/ML
INJECTION, SOLUTION INTRAMUSCULAR; INTRAVENOUS
Status: DISCONTINUED | OUTPATIENT
Start: 2020-12-10 | End: 2020-12-10

## 2020-12-10 RX ORDER — METHYLPREDNISOLONE ACETATE 40 MG/ML
INJECTION, SUSPENSION INTRA-ARTICULAR; INTRALESIONAL; INTRAMUSCULAR; SOFT TISSUE
Status: DISCONTINUED | OUTPATIENT
Start: 2020-12-10 | End: 2020-12-10 | Stop reason: HOSPADM

## 2020-12-10 RX ORDER — BUPIVACAINE HYDROCHLORIDE 2.5 MG/ML
INJECTION, SOLUTION INFILTRATION; PERINEURAL
Status: DISCONTINUED | OUTPATIENT
Start: 2020-12-10 | End: 2020-12-10 | Stop reason: HOSPADM

## 2020-12-10 RX ORDER — LIDOCAINE HYDROCHLORIDE 10 MG/ML
INJECTION INFILTRATION; PERINEURAL
Status: DISCONTINUED | OUTPATIENT
Start: 2020-12-10 | End: 2020-12-10 | Stop reason: HOSPADM

## 2020-12-10 RX ORDER — PROPOFOL 10 MG/ML
INJECTION, EMULSION INTRAVENOUS CONTINUOUS PRN
Status: DISCONTINUED | OUTPATIENT
Start: 2020-12-10 | End: 2020-12-10

## 2020-12-10 RX ORDER — PHENYLEPHRINE HYDROCHLORIDE 10 MG/ML
INJECTION INTRAVENOUS
Status: DISCONTINUED | OUTPATIENT
Start: 2020-12-10 | End: 2020-12-10

## 2020-12-10 RX ADMIN — PROPOFOL 150 MCG/KG/MIN: 10 INJECTION, EMULSION INTRAVENOUS at 07:12

## 2020-12-10 RX ADMIN — LIDOCAINE HYDROCHLORIDE 80 MG: 20 INJECTION, SOLUTION INTRAVENOUS at 07:12

## 2020-12-10 RX ADMIN — FENTANYL CITRATE 25 MCG: 50 INJECTION, SOLUTION INTRAMUSCULAR; INTRAVENOUS at 07:12

## 2020-12-10 RX ADMIN — SODIUM CHLORIDE, SODIUM LACTATE, POTASSIUM CHLORIDE, AND CALCIUM CHLORIDE: .6; .31; .03; .02 INJECTION, SOLUTION INTRAVENOUS at 07:12

## 2020-12-10 RX ADMIN — ONDANSETRON 4 MG: 2 INJECTION, SOLUTION INTRAMUSCULAR; INTRAVENOUS at 07:12

## 2020-12-10 RX ADMIN — PHENYLEPHRINE HYDROCHLORIDE 100 MCG: 10 INJECTION INTRAVENOUS at 07:12

## 2020-12-10 RX ADMIN — PROPOFOL 50 MG: 10 INJECTION, EMULSION INTRAVENOUS at 07:12

## 2020-12-10 NOTE — OP NOTE
DATE OF PROCEDURE: 12/10/2020     PREOPERATIVE DIAGNOSES:  1.  Right sacroiliac joint dysfunction and refractory pain     POSTOPERATIVE DIAGNOSES:   1.  Right sacroiliac joint dysfunction and refractory pain     NAME OF OPERATION:  1. Right sacroiliac joint block  2. Fluoroscopy     PRIMARY SURGEON: Mk George M.D.     ASSISTANT SURGEON: SINDHU     INDICATION FOR PROCEDURE: This is a very pleasant 60 y.o. female, who is status post L5-S1 oblique interbody fusion with posterior instrumentation for DDD with foraminal stenosis. She has developed worsening right SI joint pain over the last 6 months. The pain is worse with prolonged sitting or walking. She has tried physical therapy exercises including hip strengthening and flexibility exercises in the past without significant pain improvement. She never received SI joint injection. She reports that her blood sugar is better controlled with insulin. No new onset of motor weakness or numbness.     DESCRIPTION OF THE PROCEDURE     The patient was placed on MAC anesthesia and was prone on the Slider table.  All pressure points were carefully padded. The patient was prepped and draped   in the typical sterile fashion.   Using the lateral and outlet views, and after local anesthesia with 1% lidocaine, the 22 spinal evelyne needle was inserted inside the right sacroiliac joints and 0.5cc of Omnipaque was injected to confirm the needle tip placement.  We then injected  4 cc of 0.25% marcaine mixed with 40 mg of depomedrol in the right SI joints. The wound was dressed with a sterile dressing. The blood loss was less than 1 cc. The final count was completed and nothing was missing. There was no complication.

## 2020-12-10 NOTE — PLAN OF CARE
Patient awake and alert.  Denies pain.  Discharge instructions to restart home medications (patient's BP elevated), diet, signs and symptoms when to call the MD, Pain Diary  and follow-up visits are given to the patient and patient verbalized complete understanding.  Spouse is at bedside and will accompany patient home.  Bandaid intact to back with no drainage noted.  Will wheel patient to the Exit when patient is ready. Voiced no other concerns.

## 2020-12-10 NOTE — ANESTHESIA POSTPROCEDURE EVALUATION
Anesthesia Post Evaluation    Patient: Mariann Huff    Procedure(s) Performed: Procedure(s) (LRB):  INJECTION, STEROID Right SI Joint Block and Steroid Injection (Right)    Final Anesthesia Type: MAC    Patient location during evaluation: OPS  Patient participation: Yes- Able to Participate  Level of consciousness: awake and alert and oriented  Post-procedure vital signs: reviewed and stable  Pain management: adequate  Airway patency: patent    PONV status at discharge: No PONV  Anesthetic complications: no      Cardiovascular status: blood pressure returned to baseline and hemodynamically stable  Respiratory status: unassisted, spontaneous ventilation and room air  Hydration status: euvolemic  Follow-up not needed.          Vitals Value Taken Time   /66 12/10/20 0756   Temp 36.6 °C (97.9 °F) 12/10/20 0756   Pulse 73 12/10/20 0756   Resp 16 12/10/20 0756   SpO2 99 % 12/10/20 0756         No case tracking events are documented in the log.      Pain/Derick Score: No data recorded

## 2020-12-10 NOTE — PLAN OF CARE
Patient transferred to room from Surgery.  Bandaid intact to right side of back.  Denies pain.   at bedside.  Safety is maintained with stretcher at the lowest, wheels locked and side rails up.  Call light within reach.  Will continue to monitor.

## 2020-12-10 NOTE — TRANSFER OF CARE
Anesthesia Transfer of Care Note    Patient: Mariann Huff    Procedure(s) Performed: Procedure(s) (LRB):  INJECTION, STEROID Right SI Joint Block and Steroid Injection (Right)    Patient location: OPS    Anesthesia Type: MAC    Transport from OR: Transported from OR on room air with adequate spontaneous ventilation    Post pain: adequate analgesia    Post assessment: no apparent anesthetic complications and tolerated procedure well    Post vital signs: stable    Level of consciousness: awake, alert and oriented    Nausea/Vomiting: no nausea/vomiting    Complications: none    Transfer of care protocol was followed      Last vitals:   Visit Vitals  /66   Pulse 73   Temp 36.6 °C (97.9 °F)   Resp 16   LMP  (LMP Unknown)   SpO2 99%   Breastfeeding No

## 2020-12-10 NOTE — H&P
NEUROSURGICAL PROGRESS NOTE   DATE OF SERVICE:   12-10-20  ATTENDING PHYSICIAN:   Mk George MD   SUBJECTIVE:   INTERIM HISTORY:   This is a very pleasant 60 y.o. female, who is status post L5-S1 oblique interbody fusion with posterior instrumentation for DDD with foraminal stenosis. She has developed worsening right SI joint pain over the last 6 months. The pain is worse with prolonged sitting or walking. She has tried physical therapy exercises including hip strengthening and flexibility exercises in the past without significant pain improvement. She never received SI joint injection. She reports that her blood sugar is better controlled with insulin. No new onset of motor weakness or numbness.       PAST MEDICAL HISTORY:        Active Ambulatory Problems    Diagnosis Date Noted    Hypertension associated with diabetes     Hyperlipidemia associated with type 2 diabetes mellitus     CAD (coronary artery disease)     Sleep apnea     Carotid stenosis, bilateral 10/24/2012    History of non-ST elevation myocardial infarction (NSTEMI) 02/17/2014    S/P coronary artery stent placement 02/26/2014    Nuclear sclerosis - Right Eye 03/18/2014    Primary localized osteoarthrosis, lower leg 06/18/2014    Chronic sinusitis 05/07/2015    PSC (posterior subcapsular cataract), right 08/17/2015    DME (diabetic macular edema) 08/17/2015    Refractive error 08/17/2015    Pseudophakia 08/17/2015    Senile nuclear sclerosis 09/01/2015    Type 2 diabetes mellitus with diabetic peripheral angiopathy without gangrene 02/24/2016    Diabetic peripheral neuropathy associated with type 2 diabetes mellitus 02/24/2016    Cervical arthritis 08/29/2016    Neurogenic claudication 08/29/2016    Lumbar herniated disc 10/24/2016    Fatty liver disease, nonalcoholic 11/01/2017    Arthritis of lumbar spine 07/23/2018    Chronic pain 09/25/2018    Lumbar radiculopathy 02/06/2019    Lumbosacral radiculopathy at L5  04/12/2019    Ureteral transection of left native kidney 04/13/2019    Type 2 diabetes mellitus with stage 2 chronic kidney disease and hypertension 04/20/2019    Asthma 04/20/2019    Normocytic anemia     Bradycardia 05/08/2019    Delayed surgical wound healing 05/13/2019    Rectal ulcer     Palliative care encounter 05/27/2019    Acute deep vein thrombosis (DVT) of femoral vein of right lower extremity     Impaired functional mobility and endurance 05/31/2019    (HFpEF) heart failure with preserved ejection fraction 06/06/2019    Alteration in skin integrity 06/07/2019    Debility 06/10/2019    Staph infection     Chronic combined systolic and diastolic CHF (congestive heart failure) 07/02/2019    Left ureteral injury 07/24/2019    Hydronephrosis 10/07/2019    Serum albumin decreased 11/01/2019    Weakness of both lower extremities 11/11/2019    Poor balance 11/11/2019    Intraoperative ureteral injury 12/11/2019    Uncontrolled type 2 diabetes mellitus with both eyes affected by severe nonproliferative retinopathy and macular edema, with long-term current use of insulin 01/09/2020    Hypertensive retinopathy, bilateral 01/09/2020          Resolved Ambulatory Problems    Diagnosis Date Noted    Diabetes mellitus type II, uncontrolled 10/24/2012    Obesity 10/24/2012    Neck pain 03/07/2013    Non compliance with medical treatment 06/19/2013    PAPA (acute kidney injury) 02/17/2014    Coronary stent restenosis 02/26/2014    Type II or unspecified type diabetes mellitus with other specified manifestations, uncontrolled 06/06/2014    Peripheral neuropathy 06/06/2014    Enthesopathy of hip region 06/18/2014    Type II or unspecified type diabetes mellitus with peripheral circulatory disorders, uncontrolled(250.72) 09/25/2014    Diabetes mellitus with peripheral artery disease 02/02/2015    Dysphagia 08/12/2016    Neck pain on right side 10/14/2016    Chronic bilateral low back pain  with sciatica 10/14/2016    Decreased range of motion 10/14/2016    Decreased strength 10/14/2016    Decreased functional mobility 10/14/2016    Closed nondisplaced fracture of fifth metatarsal bone of right foot with routine healing 07/14/2017    Ureteral stent retained 04/16/2019    Sepsis 04/20/2019    Encephalopathy 04/20/2019    Altered mental status     Encounter for weaning from ventilator     Metabolic acidosis     At risk for fluid volume overload 04/21/2019    On enteral nutrition 04/24/2019    Postoperative infection     Acute postoperative respiratory failure     Pulmonary edema     Dermatitis associated with moisture 05/06/2019    BRBPR (bright red blood per rectum) 05/07/2019    Urinary tract infection without hematuria     Status post insertion of percutaneous endoscopic gastrostomy (PEG) tube     Pain     Protein malnutrition     Acute on chronic respiratory failure with hypoxia 06/06/2019    Elevated troponin 06/06/2019    Fever 06/07/2019    Pneumonia of both lungs due to Pseudomonas species 06/09/2019    Acute combined systolic and diastolic heart failure 06/13/2019    Excessive carbohydrate intake 06/14/2019    Tracheostomy in place     Nephrostomy status 07/11/2019    Nephrostomy tube displaced 07/18/2019    Weakness of both lower extremities 08/19/2019    Poor balance 08/19/2019    Gait, antalgic 08/19/2019          Past Medical History:   Diagnosis Date    Anticoagulant long-term use     Asthma     Back pain     Bradycardia, unspecified 5/8/2019    Carotid artery stenosis     Diabetes mellitus type 2 in obese     HTN (hypertension), benign     Hyperlipidemia     Keloid cicatrix     NSTEMI (non-ST elevated myocardial infarction)      PAST SURGICAL HISTORY:         Past Surgical History:   Procedure Laterality Date    ANTEGRADE NEPHROSTOGRAPHY Left 12/11/2019    Procedure: Nephrostogram - antegrade; Surgeon: Robin Boyd MD; [facility]  FL[facility]; Service: Urology; Laterality: Left;    BRONCHOSCOPY N/A 2019    Procedure: BRONCHOSCOPY; Surgeon: Sean Ruano MD; [facility]; Service: General; Laterality: N/A;    CARDIAC CATHETERIZATION      CATARACT EXTRACTION      cataract extraction left eye      cataracts      CAUDAL EPIDURAL STEROID INJECTION N/A 2019    Procedure: Injection-steroid-epidural-caudal; Surgeon: Dave Bentley Jr., MD; Location: [facility] PAIN MGT; Service: Pain Management; Laterality: N/A;     SECTION, LOW TRANSVERSE      COLONOSCOPY N/A 2019    Procedure: COLONOSCOPY; Surgeon: Al Alaniz MD; Location: [facility] ENDO (2ND FLR); Service: Endoscopy; Laterality: N/A;    CORONARY ANGIOPLASTY      CYSTOGRAM N/A 2019    Procedure: CYSTOGRAM INTRAOP; Surgeon: Robin Boyd MD; [facility]; Service: Urology; Laterality: N/A; 1 HOUR    CYSTOSCOPY W/ RETROGRADES Left 2019    Procedure: CYSTOSCOPY, WITH RETROGRADE PYELOGRAM; Surgeon: Robin Boyd MD; [facility]; Service: Urology; Laterality: Left; fluro    ESOPHAGOGASTRODUODENOSCOPY W/ PEG  2019    Procedure: EGD, WITH PEG TUBE INSERTION; Surgeon: Sean Ruano MD; [facility]; Service: General;;    EXCISION TURBINATE, SUBMUCOUS      FLEXIBLE SIGMOIDOSCOPY N/A 2019    Procedure: SIGMOIDOSCOPY, FLEXIBLE; Surgeon: ALBERTO Amin MD; [facility] Service: Endoscopy; Laterality: N/A;    FLEXIBLE SIGMOIDOSCOPY N/A 2019    Procedure: SIGMOIDOSCOPY, FLEXIBLE; Surgeon: ALBERTO Amin MD; [facility] Service: Endoscopy; Laterality: N/A;    FUSION OF LUMBAR SPINE BY ANTERIOR APPROACH Left 2019    Procedure: FUSION, SPINE, LUMBAR, ANTERIOR APPROACH Left L5-S1 Anterior to Psoa Interbody Fusion, L5-S1 Posterior Instrumentation; Surgeon: Mk George MD; [facility]; Service: Neurosurgery; Laterality: Left; Porcedure:  Left L5-S1 Anterior to Psoa Interbody Fusion, L5-S1 Posterior Instrumentation   Surgery Time: 4 Hrs   LOS: 2-3   Anesthesia: General   Blood: Type & Screen   R    HAND SURGERY Left     HAND SURGERY Right     torn ligament repair/ Dr. Yeboah    HYSTERECTOMY      left foot surgery      left wrist surgery      LYSIS OF ADHESIONS N/A 2/19/2020    Procedure: LYSIS, ADHESIONS; Surgeon: Robin Boyd MD; Location: [facility] OR [address] [place] [address]; Service: Urology; Laterality: N/A;    NASAL SEPTUM SURGERY  5/7/15    PERCUTANEOUS NEPHROSTOMY Left 4/21/2019    Procedure: Creation, Nephrostomy, Percutaneous; Surgeon: Karina Surgeon; Location: [facility]; Service: Anesthesiology; Laterality: Left;    REPAIR OF URETER  4/12/2019    Procedure: REPAIR, URETER; Surgeon: Mk George MD; Location: [facility] OR [address] [place] [address]; Service: Neurosurgery;;    REPLACEMENT OF NEPHROSTOMY TUBE N/A 7/18/2019    Procedure: REPLACEMENT, NEPHROSTOMY TUBE; Surgeon: [facility] Diagnostic Provider; [address]; Service: Anesthesiology; Laterality: N/A; 188    REPLACEMENT OF NEPHROSTOMY TUBE N/A 7/24/2019    Procedure: REPLACEMENT, NEPHROSTOMY TUBE; Surgeon: [facility] Diagnostic Provider; [facility]; Service: Anesthesiology; Laterality: N/A; 188    REPLACEMENT OF NEPHROSTOMY TUBE N/A 10/7/2019    Procedure: REPLACEMENT, NEPHROSTOMY TUBE; Surgeon: [facility] Diagnostic Provider; Location: [address]; Service: Anesthesiology; Laterality: N/A; 189    REPLACEMENT OF NEPHROSTOMY TUBE N/A 11/25/2019    Procedure: REPLACEMENT, NEPHROSTOMY TUBE; Surgeon: [facility] Diagnostic Provider; [address]; Service: Anesthesiology; Laterality: N/A; Room 188/Bessy    REPLACEMENT OF NEPHROSTOMY TUBE Right 2/19/2020    Procedure: REPLACEMENT, NEPHROSTOMY TUBE removal removal; Surgeon: Robin Boyd MD; Location: [facility] OR [address] [place] [address]; Service: Urology; Laterality: Right;    rt elbow surgery      S/P LAD COATED STENT  05/14/2010    6 total     S/P OM1 STENT   08/2003    SINUS SURGERY      F.E.S.S.    TRACHEOSTOMY N/A 5/2/2019    Procedure: CREATION, TRACHEOSTOMY; Surgeon: Sean Ruano MD; Location: [facility] OR [address] [place] [address]; Service: General; Laterality: N/A;    TUBAL LIGATION      URETERAL REIMPLANTION Left 2/19/2020    Procedure: REIMPLANTATION, URETER WITH PSOAS HITCH; Surgeon: Robin Boyd MD; Location: [facility] OR [address] [place] [address]; Service: Urology; Laterality: Left;     SOCIAL HISTORY:   Social History                                                                                                                                                                                                                                                                                                       FAMILY HISTORY:         Family History   Problem Relation Age of Onset    Diabetes Mother     Heart disease Mother     Diabetes Father     Leukemia Father     leukemia    Heart attack Father     Diabetes Sister     Diabetes Brother     Diabetes Sister     No Known Problems Sister     No Known Problems Brother     No Known Problems Brother     No Known Problems Maternal Grandmother     No Known Problems Maternal Grandfather     No Known Problems Paternal Grandmother     No Known Problems Paternal Grandfather     No Known Problems Son     No Known Problems Son     No Known Problems Maternal Aunt     No Known Problems Maternal Uncle     No Known Problems Paternal Aunt     No Known Problems Paternal Uncle     Colon cancer Neg Hx     Inflammatory bowel disease Neg Hx     Melanoma Neg Hx     Psoriasis Neg Hx     Lupus Neg Hx     Eczema Neg Hx     Acne Neg Hx     Amblyopia Neg Hx     Blindness Neg Hx     Cancer Neg Hx     Cataracts Neg Hx     Glaucoma Neg Hx     Hypertension Neg Hx     Macular degeneration Neg Hx     Retinal detachment Neg Hx     Strabismus Neg Hx     Stroke Neg Hx     Thyroid disease Neg Hx     Heart failure Neg Hx     Hyperlipidemia Neg  Hx      CURRENTS MEDICATIONS:          Current Outpatient Medications on File Prior to Visit   Medication Sig Dispense Refill    ACCU-CHEK EDIN PLUS METER [facility]       albuterol (PROVENTIL/VENTOLIN HFA) 90 mcg/actuation inhaler Inhale 2 puffs into the lungs every 6 (six) hours as needed for Wheezing. 1 g 3    albuterol-ipratropium (DUO-NEB) 2.5 mg-0.5 mg/3 mL nebulizer solution Inhale 3 mLs into the lungs.      amLODIPine (NORVASC) 10 MG tablet TAKE 1 TABLET BY MOUTH EVERY DAY 90 tablet 2    aspirin 81 mg Tab Take 81 mg by mouth. 1 Tablet Oral Every day      atorvastatin (LIPITOR) 80 MG tablet 1 tablet (80 mg total) by Per G Tube route once daily. 90 tablet 3    blood sugar diagnostic (ACCU-CHEK EDIN PLUS TEST STRP) Strp TEST BLOOD SUGARS 4 TIMES DAILY 150 strip 11    blood-glucose meter,continuous (DEXCOM G6 ) Misc 1 each by Misc.(Non-Drug; Combo Route) route continuous 1 each PRN    blood-glucose sensor (DEXCOM G6 SENSOR) Leann 3 each by Misc.(Non-Drug; Combo Route) route continuous. 3 each PRN    blood-glucose transmitter (DEXCOM G6 TRANSMITTER) Leann 1 each by Misc.(Non-Drug; Combo Route) route continuous 1 each PRN    ciprofloxacin HCl (CIPRO) 500 MG tablet Take 1 tablet (500 mg total) by mouth every 12 (twelve) hours. for 10 days 20 tablet 0    clopidogreL (PLAVIX) 75 mg tablet TAKE 1 TABLET BY MOUTH EVERY DAY 90 tablet 3    dulaglutide (TRULICITY) 1.5 mg/0.5 mL pen injector Inject 1.5 mg into the skin once a week. 12 pen 3    escitalopram oxalate (LEXAPRO) 10 MG tablet Take 1 tablet (10 mg total) by mouth once daily. 90 tablet 3    eszopiclone (LUNESTA) 2 MG Tab TAKE 1 TABLET (2 MG TOTAL) BY MOUTH EVERY EVENING. 30 tablet 0    furosemide (LASIX) 40 MG tablet Take 1 tablet (40 mg total) by mouth once daily. 90 tablet 3    insulin aspart U-100 (NOVOLOG FLEXPEN U-100 INSULIN) 100 unit/mL (3 mL) InPn pen Inject 8 Units into the skin 3 (three) times daily with meals. 6 mL 5    insulin  "glargine U-300 conc (TOUJEO MAX U-300 SOLOSTAR) 300 unit/mL (3 mL) InPn Inject 22 Units into the skin every evening. 6 mL 5    insulin lispro 100 unit/mL injection To use with Vgo 20 with 3 clicks with meals. Max TDD (total daily dose) 80 20 mL 3    isosorbide mononitrate (ISMO,MONOKET) 10 mg tablet Take 1 tablet (10 mg total) by mouth once daily. 30 tablet 6    losartan (COZAAR) 100 MG tablet TAKE 1 TABLET BY MOUTH EVERY DAY 90 tablet 3    metroNIDAZOLE (FLAGYL) 500 MG tablet Take 1 tablet (500 mg total) by mouth 3 (three) times daily. for 10 days 30 tablet 0    multivitamin (THERAGRAN) per tablet Take 1 tablet by mouth once daily.      nitroGLYCERIN (NITROSTAT) 0.4 MG SL tablet Take one every 2-3 min till chestpain relief for 3 times and if still no relief, call MD or come to ED 30 tablet 11    ondansetron (ZOFRAN-ODT) 8 MG TbDL Take 1 tablet (8 mg total) by mouth every 12 (twelve) hours as needed. 30 tablet 2    ondansetron (ZOFRAN-ODT) 8 MG TbDL Take 1 tablet (8 mg total) by mouth every 12 (twelve) hours as needed. 30 tablet 2    pen needle, diabetic (NOVOTWIST) 32 gauge x 1/5" Ndle Use 1 needle to inject insulin four times daily 400 each 3    polyethylene glycol (GLYCOLAX) 17 gram PwPk Mix 1 capful (17 g) in liquid and take by mouth once daily. 14 each 0    potassium chloride SA (K-DUR,KLOR-CON) 20 MEQ tablet Take 1 tablet (20 mEq total) by mouth 2 (two) times daily. 60 tablet 11    sub-q insulin device, 20 unit (VGO 20) Leann 1 Device by Misc.(Non-Drug; Combo Route) route once daily. 30 Device 3    [DISCONTINUED] oxyCODONE-acetaminophen (PERCOCET) 5-325 mg per tablet Take 1 tablet by mouth every 8 (eight) hours as needed for Pain. 28 tablet 0    famotidine (PEPCID) 20 MG tablet Take 1 tablet (20 mg total) by mouth 2 (two) times daily. 60 tablet 11    gabapentin (NEURONTIN) 300 MG capsule Take 1 capsule (300 mg total) by mouth 2 (two) times daily. 60 capsule 11    metoprolol tartrate (LOPRESSOR) " 25 MG tablet Take 1 tablet (25 mg total) by mouth 2 (two) times daily. 60 tablet 11               Current Facility-Administered Medications on File Prior to Visit   Medication Dose Route Frequency Provider Last Rate Last Dose    lidocaine (PF) 10 mg/ml (1%) injection 10 mg 1 mL Intradermal Once Dave Bentley Jr., MD       ALLERGIES:         Review of patient's allergies indicates:   Allergen Reactions    Milk containing products      Causes GI distress     REVIEW OF SYSTEMS:   Review of Systems   Constitutional: Negative for diaphoresis, fever and weight loss.   Respiratory: Negative for shortness of breath.   Cardiovascular: Negative for chest pain.   Gastrointestinal: Negative for blood in stool.   Genitourinary: Negative for hematuria.   Endo/Heme/Allergies: Does not bruise/bleed easily.   All other systems reviewed and are negative.     OBJECTIVE:   PHYSICAL EXAMINATION:       Vitals:    11/16/20 1036   BP: (!) 170/80   Pulse: 83     Physical Exam:   Vitals reviewed.   Constitutional: She appears well-developed and well-nourished.   Eyes: Pupils are equal, round, and reactive to light. Conjunctivae and EOM are normal.   Cardiovascular: Normal distal pulses and no edema.   Abdominal: Soft.   Skin: Skin displays no rash on trunk and no rash on extremities. Skin displays no lesions on trunk and no lesions on extremities.     Psych/Behavior: She is alert. She is oriented to person, place, and time. She has a normal mood and affect.     Musculoskeletal:   Neck: Range of motion is full.     Neurological:   DTRs: Tricep reflexes are 2+ on the right side and 2+ on the left side. Bicep reflexes are 2+ on the right side and 2+ on the left side. Brachioradialis reflexes are 2+ on the right side and 2+ on the left side. Patellar reflexes are 2+ on the right side and 2+ on the left side. Achilles reflexes are 2+ on the right side and 2+ on the left side.     Back Exam   Tenderness   The patient is experiencing  tenderness in the sacroiliac.   Range of Motion   Extension: abnormal   Flexion: abnormal   Lateral bend right: abnormal   Lateral bend left: abnormal   Rotation right: abnormal   Rotation left: abnormal   Muscle Strength   Right Quadriceps: 5/5   Left Quadriceps: 5/5   Right Hamstrings: 5/5   Left Hamstrings: 5/5   Tests   Straight leg raise right: negative   Straight leg raise left: negative   Other   Toe walk: normal   Heel walk: normal     SI joint:   Palpation at the right SI joint is painful   FORD test is positive on the right side   Gaenslen test is positive on the right side   Thigh thrust test is positive on the right side   Poor hip flexibility in flexion and abduction and external rotation bilaterally but worse on the right side   Neurologic Exam   Mental Status   Oriented to person, place, and time.   Speech: speech is normal   Level of consciousness: alert   Cranial Nerves   Cranial nerves II through XII intact.   CN III, IV, VI   Pupils are equal, round, and reactive to light.  Extraocular motions are normal.   Motor Exam   Muscle bulk: normal  Overall muscle tone: normal   Strength   Right deltoid: 5/5   Left deltoid: 5/5   Right biceps: 5/5   Left biceps: 5/5   Right triceps: 5/5   Left triceps: 5/5   Right wrist flexion: 5/5   Left wrist flexion: 5/5   Right wrist extension: 5/5   Left wrist extension: 5/5   Right interossei: 5/5   Left interossei: 5/5   Right iliopsoas: 5/5   Left iliopsoas: 5/5   Right quadriceps: 5/5   Left quadriceps: 5/5   Right hamstrin/5   Left hamstrin/5   Right anterior tibial: 5/5   Left anterior tibial: 5/5   Right posterior tibial: 5/5   Left posterior tibial: 5/5   Right peroneal: 5/5   Left peroneal: 5/5   Right gastroc: 5/5   Left gastroc: 5/5   Sensory Exam   Light touch normal.   Pinprick normal.   Gait, Coordination, and Reflexes   Gait   Gait: normal   Coordination   Finger to nose coordination: normal  Tandem walking coordination: normal   Reflexes    Right brachioradialis: 2+  Left brachioradialis: 2+  Right biceps: 2+  Left biceps: 2+  Right triceps: 2+  Left triceps: 2+  Right patellar: 2+  Left patellar: 2+  Right achilles: 2+  Left achilles: 2+  Right plantar: normal  Left plantar: normal  Right Crump: absent  Left Crump: absent  Right ankle clonus: absent  Left ankle clonus: absent     DIAGNOSTIC DATA:   I personally interpreted the following imaging:   Most recent lumbar x-ray shows consolidation of the fusion at L5-S1   ASSESMENT:   This is a 60 y.o. female with       Problem List Items Addressed This Visit       None                  Visit Diagnoses       Sacroiliac joint dysfunction of right side - Primary    Relevant Orders    CT Pelvis Without Contrast    Status post lumbar spinal fusion     Relevant Orders    CT Pelvis Without Contrast          PLAN:   I explained the natural history of the disease and all treatment options. I recommended a right SI joint block and steroid injection to diagnose and treat her right SI joint dysfunction. She already did more than 6 weeks of physical therapy for SI joint dysfunction   We have discussed the risks of surgery including death, coma, bleeding, infection, failure of surgery, CSF leak, nerve root injury, spinal cord injury, ureter injury, weakness, paralysis, peripheral neuropathy, need for reoperation. Patient understands the risks and would like to proceed with surgery.   The patient has increase perioperative risks because of these comorbidities: Type 2 diabetes.     Mk George MD   Cell:[phone]

## 2020-12-14 RX ORDER — ESZOPICLONE 2 MG/1
2 TABLET, FILM COATED ORAL NIGHTLY
Qty: 30 TABLET | Refills: 0 | Status: SHIPPED | OUTPATIENT
Start: 2020-12-14 | End: 2021-01-21

## 2020-12-14 NOTE — DISCHARGE SUMMARY
[facility]  Neurosurgery  Discharge Summary      Patient Name: Mariann Huff  MRN: [id number]  Admission Date: 12/10/2020  Hospital Length of Stay: 0 days  Discharge Date and Time: 12/10/2020  8:40 AM  Attending Physician: No att. providers found   Discharging Provider: Mk George MD  Primary Care Provider: Jasbir Haney MD     HPI: This is a very pleasant 60 y.o. female, who is status post L5-S1 oblique interbody fusion with posterior instrumentation for DDD with foraminal stenosis. She has developed worsening right SI joint pain over the last 6 months. The pain is worse with prolonged sitting or walking. She has tried physical therapy exercises including hip strengthening and flexibility exercises in the past without significant pain improvement. She never received SI joint injection. She reports that her blood sugar is better controlled with insulin. No new onset of motor weakness or numbness.    Procedure(s) (LRB):  INJECTION, STEROID Right SI Joint Block and Steroid Injection (Right)     Hospital Course:  Procedure was uncomplicated, postoperative course uneventful.  The patient was discharged home after the injection, after voiding.    Consults:  none    Significant Diagnostic Studies:  None    Pending Diagnostic Studies:     None        Final Active Diagnoses:    Diagnosis Date Noted POA    PRINCIPAL PROBLEM:  SI (sacroiliac) joint dysfunction [M53.3] 12/10/2020 Yes      Problems Resolved During this Admission:      Discharged Condition: good    Disposition: Home or Self Care    Follow Up:    Patient Instructions:      Diet general     Medications:  Reconciled Home Medications:      Medication List      CONTINUE taking these medications    ACCU-CHEK EDIN PLUS METER Misc  Generic drug: blood-glucose meter     albuterol 90 mcg/actuation inhaler  Commonly known as: PROVENTIL/VENTOLIN HFA  Inhale 2 puffs into the lungs every 6 (six) hours as needed for Wheezing.     albuterol-ipratropium  2.5 mg-0.5 mg/3 mL nebulizer solution  Commonly known as: DUO-NEB  Inhale 3 mLs into the lungs.     amLODIPine 10 MG tablet  Commonly known as: NORVASC  TAKE 1 TABLET BY MOUTH EVERY DAY     aspirin 81 mg Tab  Take 81 mg by mouth. 1 Tablet Oral Every day     atorvastatin 80 MG tablet  Commonly known as: LIPITOR  1 tablet (80 mg total) by Per G Tube route once daily.     blood sugar diagnostic Strp  Commonly known as: ACCU-CHEK EDIN PLUS TEST STRP  TEST BLOOD SUGARS 4 TIMES DAILY     clopidogreL 75 mg tablet  Commonly known as: PLAVIX  TAKE 1 TABLET BY MOUTH EVERY DAY     DEXCOM G6  Misc  Generic drug: blood-glucose meter,continuous  1 each by Misc.(Non-Drug; Combo Route) route continuous     DEXCOM G6 SENSOR Leann  Generic drug: blood-glucose sensor  3 each by Misc.(Non-Drug; Combo Route) route continuous.     DEXCOM G6 TRANSMITTER Leann  Generic drug: blood-glucose transmitter  1 each by Misc.(Non-Drug; Combo Route) route continuous     escitalopram oxalate 10 MG tablet  Commonly known as: LEXAPRO  Take 1 tablet (10 mg total) by mouth once daily.     famotidine 20 MG tablet  Commonly known as: PEPCID  Take 1 tablet (20 mg total) by mouth 2 (two) times daily.     furosemide 40 MG tablet  Commonly known as: LASIX  Take 1 tablet (40 mg total) by mouth once daily.     gabapentin 300 MG capsule  Commonly known as: NEURONTIN  Take 1 capsule (300 mg total) by mouth 2 (two) times daily.     HumaLOG U-100 Insulin 100 unit/mL injection  Generic drug: insulin lispro  To use with Vgo 20 with 3 clicks with meals. Max TDD (total daily dose) 80     insulin aspart U-100 100 unit/mL (3 mL) Inpn pen  Commonly known as: NovoLOG Flexpen U-100 Insulin  Inject 8 Units into the skin 3 (three) times daily with meals.     isosorbide mononitrate 10 mg tablet  Commonly known as: ISMO,MONOKET  Take 1 tablet (10 mg total) by mouth once daily.     losartan 100 MG tablet  Commonly known as: COZAAR  TAKE 1 TABLET BY MOUTH EVERY DAY    "  metoprolol tartrate 25 MG tablet  Commonly known as: LOPRESSOR  Take 1 tablet (25 mg total) by mouth 2 (two) times daily.     multivitamin per tablet  Commonly known as: THERAGRAN  Take 1 tablet by mouth once daily.     nitroGLYCERIN 0.4 MG SL tablet  Commonly known as: NITROSTAT  Take one every 2-3 min till chestpain relief for 3 times and if still no relief, call MD or come to ED     * ondansetron 8 MG Tbdl  Commonly known as: ZOFRAN-ODT  Take 1 tablet (8 mg total) by mouth every 12 (twelve) hours as needed.     * ondansetron 8 MG Tbdl  Commonly known as: ZOFRAN-ODT  Take 1 tablet (8 mg total) by mouth every 12 (twelve) hours as needed.     oxyCODONE 10 mg Tab immediate release tablet  Commonly known as: ROXICODONE  Take 0.5-1 tablets (5-10 mg total) by mouth every 12 (twelve) hours as needed for Pain.     pen needle, diabetic 32 gauge x 1/5" Ndle  Commonly known as: NOVOTWIST  Use 1 needle to inject insulin four times daily     polyethylene glycol 17 gram Pwpk  Commonly known as: GLYCOLAX  Mix 1 capful (17 g) in liquid and take by mouth once daily.     potassium chloride SA 20 MEQ tablet  Commonly known as: K-DUR,KLOR-CON  Take 1 tablet (20 mEq total) by mouth 2 (two) times daily.     TOUJEO MAX U-300 SOLOSTAR 300 unit/mL (3 mL) Inpn  Generic drug: insulin glargine U-300 conc  Inject 22 Units into the skin every evening.     TRULICITY 1.5 mg/0.5 mL pen injector  Generic drug: dulaglutide  Inject 1.5 mg into the skin once a week.     V-GO 20 Leann  Generic drug: sub-q insulin device, 20 unit  1 Device by Misc.(Non-Drug; Combo Route) route once daily.         * This list has 2 medication(s) that are the same as other medications prescribed for you. Read the directions carefully, and ask your doctor or other care provider to review them with you.                Mk George MD  Neurosurgery  [facility]  "

## 2020-12-28 ENCOUNTER — LAB VISIT (OUTPATIENT)
Dept: LAB | Facility: HOSPITAL | Age: 60
End: 2020-12-28
Attending: INTERNAL MEDICINE
Payer: COMMERCIAL

## 2020-12-28 DIAGNOSIS — N18.2 TYPE 2 DIABETES MELLITUS WITH STAGE 2 CHRONIC KIDNEY DISEASE AND HYPERTENSION: ICD-10-CM

## 2020-12-28 DIAGNOSIS — E11.22 TYPE 2 DIABETES MELLITUS WITH STAGE 2 CHRONIC KIDNEY DISEASE AND HYPERTENSION: ICD-10-CM

## 2020-12-28 DIAGNOSIS — I12.9 TYPE 2 DIABETES MELLITUS WITH STAGE 2 CHRONIC KIDNEY DISEASE AND HYPERTENSION: ICD-10-CM

## 2020-12-28 LAB
ESTIMATED AVG GLUCOSE: 212 MG/DL (ref 68–131)
HBA1C MFR BLD HPLC: 9 % (ref 4–5.6)

## 2020-12-28 PROCEDURE — 36415 COLL VENOUS BLD VENIPUNCTURE: CPT | Mod: PO

## 2020-12-28 PROCEDURE — 83036 HEMOGLOBIN GLYCOSYLATED A1C: CPT

## 2021-01-04 ENCOUNTER — OFFICE VISIT (OUTPATIENT)
Dept: ENDOCRINOLOGY | Facility: CLINIC | Age: 61
End: 2021-01-04
Payer: COMMERCIAL

## 2021-01-04 VITALS
WEIGHT: 167.88 LBS | OXYGEN SATURATION: 96 % | BODY MASS INDEX: 28.66 KG/M2 | RESPIRATION RATE: 18 BRPM | HEART RATE: 82 BPM | HEIGHT: 64 IN | SYSTOLIC BLOOD PRESSURE: 135 MMHG | DIASTOLIC BLOOD PRESSURE: 70 MMHG

## 2021-01-04 DIAGNOSIS — E11.22 TYPE 2 DIABETES MELLITUS WITH STAGE 2 CHRONIC KIDNEY DISEASE AND HYPERTENSION: Chronic | ICD-10-CM

## 2021-01-04 DIAGNOSIS — I25.10 CORONARY ARTERY DISEASE INVOLVING NATIVE CORONARY ARTERY OF NATIVE HEART WITHOUT ANGINA PECTORIS: Chronic | ICD-10-CM

## 2021-01-04 DIAGNOSIS — I12.9 TYPE 2 DIABETES MELLITUS WITH STAGE 2 CHRONIC KIDNEY DISEASE AND HYPERTENSION: Chronic | ICD-10-CM

## 2021-01-04 DIAGNOSIS — E11.59 HYPERTENSION ASSOCIATED WITH DIABETES: Chronic | ICD-10-CM

## 2021-01-04 DIAGNOSIS — E11.69 HYPERLIPIDEMIA ASSOCIATED WITH TYPE 2 DIABETES MELLITUS: Chronic | ICD-10-CM

## 2021-01-04 DIAGNOSIS — E78.5 HYPERLIPIDEMIA ASSOCIATED WITH TYPE 2 DIABETES MELLITUS: Chronic | ICD-10-CM

## 2021-01-04 DIAGNOSIS — N18.2 TYPE 2 DIABETES MELLITUS WITH STAGE 2 CHRONIC KIDNEY DISEASE AND HYPERTENSION: Chronic | ICD-10-CM

## 2021-01-04 DIAGNOSIS — I15.2 HYPERTENSION ASSOCIATED WITH DIABETES: Chronic | ICD-10-CM

## 2021-01-04 PROCEDURE — 99999 PR PBB SHADOW E&M-EST. PATIENT-LVL V: CPT | Mod: PBBFAC,,, | Performed by: STUDENT IN AN ORGANIZED HEALTH CARE EDUCATION/TRAINING PROGRAM

## 2021-01-04 PROCEDURE — 3075F PR MOST RECENT SYSTOLIC BLOOD PRESS GE 130-139MM HG: ICD-10-PCS | Mod: CPTII,S$GLB,, | Performed by: STUDENT IN AN ORGANIZED HEALTH CARE EDUCATION/TRAINING PROGRAM

## 2021-01-04 PROCEDURE — 99214 PR OFFICE/OUTPT VISIT, EST, LEVL IV, 30-39 MIN: ICD-10-PCS | Mod: S$GLB,,, | Performed by: STUDENT IN AN ORGANIZED HEALTH CARE EDUCATION/TRAINING PROGRAM

## 2021-01-04 PROCEDURE — 3052F PR MOST RECENT HEMOGLOBIN A1C LEVEL 8.0 - < 9.0%: ICD-10-PCS | Mod: CPTII,S$GLB,, | Performed by: STUDENT IN AN ORGANIZED HEALTH CARE EDUCATION/TRAINING PROGRAM

## 2021-01-04 PROCEDURE — 99999 PR PBB SHADOW E&M-EST. PATIENT-LVL V: ICD-10-PCS | Mod: PBBFAC,,, | Performed by: STUDENT IN AN ORGANIZED HEALTH CARE EDUCATION/TRAINING PROGRAM

## 2021-01-04 PROCEDURE — 3078F DIAST BP <80 MM HG: CPT | Mod: CPTII,S$GLB,, | Performed by: STUDENT IN AN ORGANIZED HEALTH CARE EDUCATION/TRAINING PROGRAM

## 2021-01-04 PROCEDURE — 3075F SYST BP GE 130 - 139MM HG: CPT | Mod: CPTII,S$GLB,, | Performed by: STUDENT IN AN ORGANIZED HEALTH CARE EDUCATION/TRAINING PROGRAM

## 2021-01-04 PROCEDURE — 3078F PR MOST RECENT DIASTOLIC BLOOD PRESSURE < 80 MM HG: ICD-10-PCS | Mod: CPTII,S$GLB,, | Performed by: STUDENT IN AN ORGANIZED HEALTH CARE EDUCATION/TRAINING PROGRAM

## 2021-01-04 PROCEDURE — 3052F HG A1C>EQUAL 8.0%<EQUAL 9.0%: CPT | Mod: CPTII,S$GLB,, | Performed by: STUDENT IN AN ORGANIZED HEALTH CARE EDUCATION/TRAINING PROGRAM

## 2021-01-04 PROCEDURE — 99214 OFFICE O/P EST MOD 30 MIN: CPT | Mod: S$GLB,,, | Performed by: STUDENT IN AN ORGANIZED HEALTH CARE EDUCATION/TRAINING PROGRAM

## 2021-01-15 ENCOUNTER — OFFICE VISIT (OUTPATIENT)
Dept: OPTOMETRY | Facility: CLINIC | Age: 61
End: 2021-01-15
Payer: COMMERCIAL

## 2021-01-15 DIAGNOSIS — E11.3393 MODERATE NONPROLIFERATIVE DIABETIC RETINOPATHY OF BOTH EYES WITHOUT MACULAR EDEMA ASSOCIATED WITH TYPE 2 DIABETES MELLITUS: Primary | ICD-10-CM

## 2021-01-15 DIAGNOSIS — Z96.1 PSEUDOPHAKIA OF BOTH EYES: ICD-10-CM

## 2021-01-15 DIAGNOSIS — H52.7 REFRACTIVE ERROR: ICD-10-CM

## 2021-01-15 PROCEDURE — 2023F PR DILATED RETINAL EXAM W/O EVID OF RETINOPATHY: ICD-10-PCS | Mod: S$GLB,,, | Performed by: OPTOMETRIST

## 2021-01-15 PROCEDURE — 2023F DILAT RTA XM W/O RTNOPTHY: CPT | Mod: S$GLB,,, | Performed by: OPTOMETRIST

## 2021-01-15 PROCEDURE — 92014 PR EYE EXAM, EST PATIENT,COMPREHESV: ICD-10-PCS | Mod: S$GLB,,, | Performed by: OPTOMETRIST

## 2021-01-15 PROCEDURE — 92014 COMPRE OPH EXAM EST PT 1/>: CPT | Mod: S$GLB,,, | Performed by: OPTOMETRIST

## 2021-01-15 PROCEDURE — 92015 DETERMINE REFRACTIVE STATE: CPT | Mod: S$GLB,,, | Performed by: OPTOMETRIST

## 2021-01-15 PROCEDURE — 99999 PR PBB SHADOW E&M-EST. PATIENT-LVL II: CPT | Mod: PBBFAC,,, | Performed by: OPTOMETRIST

## 2021-01-15 PROCEDURE — 99999 PR PBB SHADOW E&M-EST. PATIENT-LVL II: ICD-10-PCS | Mod: PBBFAC,,, | Performed by: OPTOMETRIST

## 2021-01-15 PROCEDURE — 92015 PR REFRACTION: ICD-10-PCS | Mod: S$GLB,,, | Performed by: OPTOMETRIST

## 2021-01-21 RX ORDER — ESZOPICLONE 2 MG/1
2 TABLET, FILM COATED ORAL NIGHTLY
Qty: 30 TABLET | Refills: 0 | Status: SHIPPED | OUTPATIENT
Start: 2021-01-21 | End: 2021-02-26

## 2021-02-09 ENCOUNTER — OFFICE VISIT (OUTPATIENT)
Dept: UROLOGY | Facility: CLINIC | Age: 61
End: 2021-02-09
Payer: COMMERCIAL

## 2021-02-09 ENCOUNTER — LAB VISIT (OUTPATIENT)
Dept: LAB | Facility: HOSPITAL | Age: 61
End: 2021-02-09
Attending: UROLOGY
Payer: COMMERCIAL

## 2021-02-09 VITALS
HEIGHT: 64 IN | WEIGHT: 169.56 LBS | HEART RATE: 81 BPM | DIASTOLIC BLOOD PRESSURE: 85 MMHG | SYSTOLIC BLOOD PRESSURE: 178 MMHG | BODY MASS INDEX: 28.95 KG/M2

## 2021-02-09 DIAGNOSIS — R10.9 LEFT FLANK PAIN: ICD-10-CM

## 2021-02-09 DIAGNOSIS — I12.9 TYPE 2 DIABETES MELLITUS WITH STAGE 2 CHRONIC KIDNEY DISEASE AND HYPERTENSION: Chronic | ICD-10-CM

## 2021-02-09 DIAGNOSIS — N18.2 TYPE 2 DIABETES MELLITUS WITH STAGE 2 CHRONIC KIDNEY DISEASE AND HYPERTENSION: Primary | Chronic | ICD-10-CM

## 2021-02-09 DIAGNOSIS — Z98.890 S/P URETERAL REIMPLANTATION: ICD-10-CM

## 2021-02-09 DIAGNOSIS — E11.22 TYPE 2 DIABETES MELLITUS WITH STAGE 2 CHRONIC KIDNEY DISEASE AND HYPERTENSION: Chronic | ICD-10-CM

## 2021-02-09 DIAGNOSIS — E11.22 TYPE 2 DIABETES MELLITUS WITH STAGE 2 CHRONIC KIDNEY DISEASE AND HYPERTENSION: Primary | Chronic | ICD-10-CM

## 2021-02-09 DIAGNOSIS — N18.2 TYPE 2 DIABETES MELLITUS WITH STAGE 2 CHRONIC KIDNEY DISEASE AND HYPERTENSION: Chronic | ICD-10-CM

## 2021-02-09 DIAGNOSIS — I12.9 TYPE 2 DIABETES MELLITUS WITH STAGE 2 CHRONIC KIDNEY DISEASE AND HYPERTENSION: Primary | Chronic | ICD-10-CM

## 2021-02-09 PROBLEM — S37.10XA: Chronic | Status: RESOLVED | Noted: 2019-04-13 | Resolved: 2021-02-09

## 2021-02-09 LAB
ANION GAP SERPL CALC-SCNC: 10 MMOL/L (ref 8–16)
BUN SERPL-MCNC: 21 MG/DL (ref 6–20)
CALCIUM SERPL-MCNC: 9.8 MG/DL (ref 8.7–10.5)
CHLORIDE SERPL-SCNC: 108 MMOL/L (ref 95–110)
CO2 SERPL-SCNC: 28 MMOL/L (ref 23–29)
CREAT SERPL-MCNC: 0.9 MG/DL (ref 0.5–1.4)
EST. GFR  (AFRICAN AMERICAN): >60 ML/MIN/1.73 M^2
EST. GFR  (NON AFRICAN AMERICAN): >60 ML/MIN/1.73 M^2
GLUCOSE SERPL-MCNC: 124 MG/DL (ref 70–110)
POTASSIUM SERPL-SCNC: 3.6 MMOL/L (ref 3.5–5.1)
SODIUM SERPL-SCNC: 146 MMOL/L (ref 136–145)

## 2021-02-09 PROCEDURE — 3077F PR MOST RECENT SYSTOLIC BLOOD PRESSURE >= 140 MM HG: ICD-10-PCS | Mod: CPTII,S$GLB,, | Performed by: UROLOGY

## 2021-02-09 PROCEDURE — 36415 COLL VENOUS BLD VENIPUNCTURE: CPT

## 2021-02-09 PROCEDURE — 3008F BODY MASS INDEX DOCD: CPT | Mod: CPTII,S$GLB,, | Performed by: UROLOGY

## 2021-02-09 PROCEDURE — 1125F AMNT PAIN NOTED PAIN PRSNT: CPT | Mod: S$GLB,,, | Performed by: UROLOGY

## 2021-02-09 PROCEDURE — 99999 PR PBB SHADOW E&M-EST. PATIENT-LVL V: CPT | Mod: PBBFAC,,, | Performed by: UROLOGY

## 2021-02-09 PROCEDURE — 3077F SYST BP >= 140 MM HG: CPT | Mod: CPTII,S$GLB,, | Performed by: UROLOGY

## 2021-02-09 PROCEDURE — 1125F PR PAIN SEVERITY QUANTIFIED, PAIN PRESENT: ICD-10-PCS | Mod: S$GLB,,, | Performed by: UROLOGY

## 2021-02-09 PROCEDURE — 99214 OFFICE O/P EST MOD 30 MIN: CPT | Mod: S$GLB,,, | Performed by: UROLOGY

## 2021-02-09 PROCEDURE — 3079F PR MOST RECENT DIASTOLIC BLOOD PRESSURE 80-89 MM HG: ICD-10-PCS | Mod: CPTII,S$GLB,, | Performed by: UROLOGY

## 2021-02-09 PROCEDURE — 99999 PR PBB SHADOW E&M-EST. PATIENT-LVL V: ICD-10-PCS | Mod: PBBFAC,,, | Performed by: UROLOGY

## 2021-02-09 PROCEDURE — 80048 BASIC METABOLIC PNL TOTAL CA: CPT

## 2021-02-09 PROCEDURE — 3052F HG A1C>EQUAL 8.0%<EQUAL 9.0%: CPT | Mod: CPTII,S$GLB,, | Performed by: UROLOGY

## 2021-02-09 PROCEDURE — 3008F PR BODY MASS INDEX (BMI) DOCUMENTED: ICD-10-PCS | Mod: CPTII,S$GLB,, | Performed by: UROLOGY

## 2021-02-09 PROCEDURE — 3079F DIAST BP 80-89 MM HG: CPT | Mod: CPTII,S$GLB,, | Performed by: UROLOGY

## 2021-02-09 PROCEDURE — 99214 PR OFFICE/OUTPT VISIT, EST, LEVL IV, 30-39 MIN: ICD-10-PCS | Mod: S$GLB,,, | Performed by: UROLOGY

## 2021-02-09 PROCEDURE — 3052F PR MOST RECENT HEMOGLOBIN A1C LEVEL 8.0 - < 9.0%: ICD-10-PCS | Mod: CPTII,S$GLB,, | Performed by: UROLOGY

## 2021-02-12 ENCOUNTER — HOSPITAL ENCOUNTER (OUTPATIENT)
Dept: RADIOLOGY | Facility: HOSPITAL | Age: 61
Discharge: HOME | End: 2021-02-12
Attending: UROLOGY
Payer: COMMERCIAL

## 2021-02-12 DIAGNOSIS — N18.2 TYPE 2 DIABETES MELLITUS WITH STAGE 2 CHRONIC KIDNEY DISEASE AND HYPERTENSION: ICD-10-CM

## 2021-02-12 DIAGNOSIS — E11.22 TYPE 2 DIABETES MELLITUS WITH STAGE 2 CHRONIC KIDNEY DISEASE AND HYPERTENSION: ICD-10-CM

## 2021-02-12 DIAGNOSIS — I12.9 TYPE 2 DIABETES MELLITUS WITH STAGE 2 CHRONIC KIDNEY DISEASE AND HYPERTENSION: ICD-10-CM

## 2021-02-12 DIAGNOSIS — Z98.890 S/P URETERAL REIMPLANTATION: ICD-10-CM

## 2021-02-12 PROCEDURE — 25500020 PHARM REV CODE 255: Performed by: UROLOGY

## 2021-02-12 PROCEDURE — 74178 CT UROGRAM ABD PELVIS W WO: ICD-10-PCS | Mod: 26,,, | Performed by: RADIOLOGY

## 2021-02-12 PROCEDURE — 74178 CT ABD&PLV WO CNTR FLWD CNTR: CPT | Mod: 26,,, | Performed by: RADIOLOGY

## 2021-02-12 PROCEDURE — 74178 CT ABD&PLV WO CNTR FLWD CNTR: CPT | Mod: TC

## 2021-02-12 RX ADMIN — IOHEXOL 125 ML: 350 INJECTION, SOLUTION INTRAVENOUS at 02:02

## 2021-02-17 ENCOUNTER — TELEPHONE (OUTPATIENT)
Dept: UROLOGY | Facility: CLINIC | Age: 61
End: 2021-02-17

## 2021-02-17 DIAGNOSIS — N28.89 RIGHT RENAL MASS: Primary | ICD-10-CM

## 2021-02-22 ENCOUNTER — TELEPHONE (OUTPATIENT)
Dept: NEUROSURGERY | Facility: CLINIC | Age: 61
End: 2021-02-22

## 2021-02-22 RX ORDER — OXYCODONE HYDROCHLORIDE 10 MG/1
5-10 TABLET ORAL EVERY 12 HOURS PRN
Qty: 60 TABLET | Refills: 0 | Status: SHIPPED | OUTPATIENT
Start: 2021-02-22 | End: 2021-03-01 | Stop reason: SDUPTHER

## 2021-02-24 ENCOUNTER — IMMUNIZATION (OUTPATIENT)
Dept: INTERNAL MEDICINE | Facility: CLINIC | Age: 61
End: 2021-02-24
Payer: COMMERCIAL

## 2021-02-24 DIAGNOSIS — Z23 NEED FOR VACCINATION: Primary | ICD-10-CM

## 2021-02-24 PROCEDURE — 91300 COVID-19, MRNA, LNP-S, PF, 30 MCG/0.3 ML DOSE VACCINE: CPT | Mod: PBBFAC | Performed by: INTERNAL MEDICINE

## 2021-02-25 ENCOUNTER — OFFICE VISIT (OUTPATIENT)
Dept: OPHTHALMOLOGY | Facility: CLINIC | Age: 61
End: 2021-02-25
Payer: COMMERCIAL

## 2021-02-25 DIAGNOSIS — H35.033 HYPERTENSIVE RETINOPATHY, BILATERAL: ICD-10-CM

## 2021-02-25 PROCEDURE — 92202 PR OPHTHALMOSCOPY, EXT, W/DRAW OPTIC NERVE/MACULA, I&R, UNI/BI: ICD-10-PCS | Mod: S$GLB,,, | Performed by: OPHTHALMOLOGY

## 2021-02-25 PROCEDURE — 92014 PR EYE EXAM, EST PATIENT,COMPREHESV: ICD-10-PCS | Mod: S$GLB,,, | Performed by: OPHTHALMOLOGY

## 2021-02-25 PROCEDURE — 1126F AMNT PAIN NOTED NONE PRSNT: CPT | Mod: S$GLB,,, | Performed by: OPHTHALMOLOGY

## 2021-02-25 PROCEDURE — 99999 PR PBB SHADOW E&M-EST. PATIENT-LVL IV: CPT | Mod: PBBFAC,,, | Performed by: OPHTHALMOLOGY

## 2021-02-25 PROCEDURE — 1126F PR PAIN SEVERITY QUANTIFIED, NO PAIN PRESENT: ICD-10-PCS | Mod: S$GLB,,, | Performed by: OPHTHALMOLOGY

## 2021-02-25 PROCEDURE — 92202 OPSCPY EXTND ON/MAC DRAW: CPT | Mod: S$GLB,,, | Performed by: OPHTHALMOLOGY

## 2021-02-25 PROCEDURE — 99999 PR PBB SHADOW E&M-EST. PATIENT-LVL IV: ICD-10-PCS | Mod: PBBFAC,,, | Performed by: OPHTHALMOLOGY

## 2021-02-25 PROCEDURE — 92014 COMPRE OPH EXAM EST PT 1/>: CPT | Mod: S$GLB,,, | Performed by: OPHTHALMOLOGY

## 2021-02-25 RX ORDER — FLUORESCEIN 500 MG/ML
5 INJECTION INTRAVENOUS ONCE
Status: DISCONTINUED | OUTPATIENT
Start: 2021-02-25 | End: 2022-08-23

## 2021-02-26 RX ORDER — ESZOPICLONE 2 MG/1
2 TABLET, FILM COATED ORAL NIGHTLY
Qty: 30 TABLET | Refills: 0 | Status: SHIPPED | OUTPATIENT
Start: 2021-02-26 | End: 2021-04-12

## 2021-03-01 ENCOUNTER — HOSPITAL ENCOUNTER (OUTPATIENT)
Dept: RADIOLOGY | Facility: HOSPITAL | Age: 61
Discharge: HOME | End: 2021-03-01
Attending: UROLOGY
Payer: COMMERCIAL

## 2021-03-01 ENCOUNTER — TELEPHONE (OUTPATIENT)
Dept: UROLOGY | Facility: CLINIC | Age: 61
End: 2021-03-01

## 2021-03-01 ENCOUNTER — OFFICE VISIT (OUTPATIENT)
Dept: NEUROSURGERY | Facility: CLINIC | Age: 61
End: 2021-03-01
Payer: COMMERCIAL

## 2021-03-01 VITALS — WEIGHT: 169.56 LBS | BODY MASS INDEX: 28.95 KG/M2 | HEIGHT: 64 IN

## 2021-03-01 DIAGNOSIS — G57.01 PIRIFORMIS SYNDROME OF RIGHT SIDE: ICD-10-CM

## 2021-03-01 DIAGNOSIS — N28.1 COMPLEX RENAL CYST: Primary | ICD-10-CM

## 2021-03-01 DIAGNOSIS — N28.89 RIGHT RENAL MASS: ICD-10-CM

## 2021-03-01 DIAGNOSIS — Z98.1 STATUS POST LUMBAR SPINAL FUSION: Primary | ICD-10-CM

## 2021-03-01 PROCEDURE — 76770 US EXAM ABDO BACK WALL COMP: CPT | Mod: TC

## 2021-03-01 PROCEDURE — 1125F AMNT PAIN NOTED PAIN PRSNT: CPT | Mod: S$GLB,,, | Performed by: NEUROLOGICAL SURGERY

## 2021-03-01 PROCEDURE — 99999 PR PBB SHADOW E&M-EST. PATIENT-LVL IV: CPT | Mod: PBBFAC,,, | Performed by: NEUROLOGICAL SURGERY

## 2021-03-01 PROCEDURE — 3008F PR BODY MASS INDEX (BMI) DOCUMENTED: ICD-10-PCS | Mod: CPTII,S$GLB,, | Performed by: NEUROLOGICAL SURGERY

## 2021-03-01 PROCEDURE — 3008F BODY MASS INDEX DOCD: CPT | Mod: CPTII,S$GLB,, | Performed by: NEUROLOGICAL SURGERY

## 2021-03-01 PROCEDURE — 76770 US RETROPERITONEAL COMPLETE: ICD-10-PCS | Mod: 26,,, | Performed by: RADIOLOGY

## 2021-03-01 PROCEDURE — 1125F PR PAIN SEVERITY QUANTIFIED, PAIN PRESENT: ICD-10-PCS | Mod: S$GLB,,, | Performed by: NEUROLOGICAL SURGERY

## 2021-03-01 PROCEDURE — 99999 PR PBB SHADOW E&M-EST. PATIENT-LVL IV: ICD-10-PCS | Mod: PBBFAC,,, | Performed by: NEUROLOGICAL SURGERY

## 2021-03-01 PROCEDURE — 99213 OFFICE O/P EST LOW 20 MIN: CPT | Mod: S$GLB,,, | Performed by: NEUROLOGICAL SURGERY

## 2021-03-01 PROCEDURE — 99213 PR OFFICE/OUTPT VISIT, EST, LEVL III, 20-29 MIN: ICD-10-PCS | Mod: S$GLB,,, | Performed by: NEUROLOGICAL SURGERY

## 2021-03-01 PROCEDURE — 76770 US EXAM ABDO BACK WALL COMP: CPT | Mod: 26,,, | Performed by: RADIOLOGY

## 2021-03-01 RX ORDER — OXYCODONE HYDROCHLORIDE 10 MG/1
5-10 TABLET ORAL EVERY 12 HOURS PRN
Qty: 60 TABLET | Refills: 0 | Status: SHIPPED | OUTPATIENT
Start: 2021-03-01 | End: 2021-05-18

## 2021-03-04 ENCOUNTER — TELEPHONE (OUTPATIENT)
Dept: CARDIOLOGY | Facility: CLINIC | Age: 61
End: 2021-03-04

## 2021-03-04 ENCOUNTER — OFFICE VISIT (OUTPATIENT)
Dept: UROLOGY | Facility: CLINIC | Age: 61
End: 2021-03-04
Payer: COMMERCIAL

## 2021-03-04 DIAGNOSIS — E11.22 TYPE 2 DIABETES MELLITUS WITH STAGE 2 CHRONIC KIDNEY DISEASE AND HYPERTENSION: ICD-10-CM

## 2021-03-04 DIAGNOSIS — E11.69 HYPERLIPIDEMIA ASSOCIATED WITH TYPE 2 DIABETES MELLITUS: ICD-10-CM

## 2021-03-04 DIAGNOSIS — I15.2 HYPERTENSION ASSOCIATED WITH DIABETES: ICD-10-CM

## 2021-03-04 DIAGNOSIS — E78.5 HYPERLIPIDEMIA ASSOCIATED WITH TYPE 2 DIABETES MELLITUS: ICD-10-CM

## 2021-03-04 DIAGNOSIS — I25.2 HISTORY OF NON-ST ELEVATION MYOCARDIAL INFARCTION (NSTEMI): ICD-10-CM

## 2021-03-04 DIAGNOSIS — I12.9 TYPE 2 DIABETES MELLITUS WITH STAGE 2 CHRONIC KIDNEY DISEASE AND HYPERTENSION: ICD-10-CM

## 2021-03-04 DIAGNOSIS — Z98.890 S/P URETERAL REIMPLANTATION: Primary | ICD-10-CM

## 2021-03-04 DIAGNOSIS — I25.10 CORONARY ARTERY DISEASE INVOLVING NATIVE CORONARY ARTERY OF NATIVE HEART WITHOUT ANGINA PECTORIS: Primary | ICD-10-CM

## 2021-03-04 DIAGNOSIS — N18.2 TYPE 2 DIABETES MELLITUS WITH STAGE 2 CHRONIC KIDNEY DISEASE AND HYPERTENSION: ICD-10-CM

## 2021-03-04 DIAGNOSIS — N28.1 COMPLEX RENAL CYST: ICD-10-CM

## 2021-03-04 DIAGNOSIS — E11.59 HYPERTENSION ASSOCIATED WITH DIABETES: ICD-10-CM

## 2021-03-04 PROCEDURE — 99212 PR OFFICE/OUTPT VISIT, EST, LEVL II, 10-19 MIN: ICD-10-PCS | Mod: 95,,, | Performed by: UROLOGY

## 2021-03-04 PROCEDURE — 99212 OFFICE O/P EST SF 10 MIN: CPT | Mod: 95,,, | Performed by: UROLOGY

## 2021-03-12 RX ORDER — METOPROLOL TARTRATE 25 MG/1
25 TABLET, FILM COATED ORAL 2 TIMES DAILY
Qty: 60 TABLET | Refills: 9 | Status: SHIPPED | OUTPATIENT
Start: 2021-03-12 | End: 2021-10-25 | Stop reason: SDUPTHER

## 2021-03-17 ENCOUNTER — IMMUNIZATION (OUTPATIENT)
Dept: INTERNAL MEDICINE | Facility: CLINIC | Age: 61
End: 2021-03-17
Payer: COMMERCIAL

## 2021-03-17 DIAGNOSIS — Z23 NEED FOR VACCINATION: Primary | ICD-10-CM

## 2021-03-17 PROCEDURE — 0002A COVID-19, MRNA, LNP-S, PF, 30 MCG/0.3 ML DOSE VACCINE: ICD-10-PCS | Mod: CV19,,, | Performed by: INTERNAL MEDICINE

## 2021-03-17 PROCEDURE — 0002A COVID-19, MRNA, LNP-S, PF, 30 MCG/0.3 ML DOSE VACCINE: CPT | Mod: CV19,,, | Performed by: INTERNAL MEDICINE

## 2021-03-17 PROCEDURE — 91300 COVID-19, MRNA, LNP-S, PF, 30 MCG/0.3 ML DOSE VACCINE: CPT | Mod: ,,, | Performed by: INTERNAL MEDICINE

## 2021-03-17 PROCEDURE — 91300 COVID-19, MRNA, LNP-S, PF, 30 MCG/0.3 ML DOSE VACCINE: ICD-10-PCS | Mod: ,,, | Performed by: INTERNAL MEDICINE

## 2021-03-25 ENCOUNTER — TELEPHONE (OUTPATIENT)
Dept: INTERNAL MEDICINE | Facility: CLINIC | Age: 61
End: 2021-03-25

## 2021-03-25 DIAGNOSIS — E11.51 TYPE 2 DIABETES MELLITUS WITH DIABETIC PERIPHERAL ANGIOPATHY WITHOUT GANGRENE, WITHOUT LONG-TERM CURRENT USE OF INSULIN: Primary | ICD-10-CM

## 2021-04-09 ENCOUNTER — LAB VISIT (OUTPATIENT)
Dept: LAB | Facility: HOSPITAL | Age: 61
End: 2021-04-09
Attending: STUDENT IN AN ORGANIZED HEALTH CARE EDUCATION/TRAINING PROGRAM
Payer: COMMERCIAL

## 2021-04-09 DIAGNOSIS — N18.2 TYPE 2 DIABETES MELLITUS WITH STAGE 2 CHRONIC KIDNEY DISEASE AND HYPERTENSION: Chronic | ICD-10-CM

## 2021-04-09 DIAGNOSIS — E11.22 TYPE 2 DIABETES MELLITUS WITH STAGE 2 CHRONIC KIDNEY DISEASE AND HYPERTENSION: Chronic | ICD-10-CM

## 2021-04-09 DIAGNOSIS — I12.9 TYPE 2 DIABETES MELLITUS WITH STAGE 2 CHRONIC KIDNEY DISEASE AND HYPERTENSION: Chronic | ICD-10-CM

## 2021-04-09 LAB
25(OH)D3+25(OH)D2 SERPL-MCNC: 6 NG/ML (ref 30–96)
ALBUMIN SERPL BCP-MCNC: 3.8 G/DL (ref 3.5–5.2)
ALP SERPL-CCNC: 135 U/L (ref 55–135)
ALT SERPL W/O P-5'-P-CCNC: 14 U/L (ref 10–44)
ANION GAP SERPL CALC-SCNC: 12 MMOL/L (ref 8–16)
AST SERPL-CCNC: 13 U/L (ref 10–40)
BILIRUB SERPL-MCNC: 1.1 MG/DL (ref 0.1–1)
BUN SERPL-MCNC: 15 MG/DL (ref 6–20)
CALCIUM SERPL-MCNC: 9.1 MG/DL (ref 8.7–10.5)
CHLORIDE SERPL-SCNC: 104 MMOL/L (ref 95–110)
CO2 SERPL-SCNC: 26 MMOL/L (ref 23–29)
CREAT SERPL-MCNC: 1.1 MG/DL (ref 0.5–1.4)
EST. GFR  (AFRICAN AMERICAN): >60 ML/MIN/1.73 M^2
EST. GFR  (NON AFRICAN AMERICAN): 54.7 ML/MIN/1.73 M^2
ESTIMATED AVG GLUCOSE: 206 MG/DL (ref 68–131)
GLUCOSE SERPL-MCNC: 210 MG/DL (ref 70–110)
HBA1C MFR BLD: 8.8 % (ref 4–5.6)
POTASSIUM SERPL-SCNC: 3.4 MMOL/L (ref 3.5–5.1)
PROT SERPL-MCNC: 7.7 G/DL (ref 6–8.4)
SODIUM SERPL-SCNC: 142 MMOL/L (ref 136–145)

## 2021-04-09 PROCEDURE — 83036 HEMOGLOBIN GLYCOSYLATED A1C: CPT | Performed by: STUDENT IN AN ORGANIZED HEALTH CARE EDUCATION/TRAINING PROGRAM

## 2021-04-09 PROCEDURE — 80053 COMPREHEN METABOLIC PANEL: CPT | Performed by: STUDENT IN AN ORGANIZED HEALTH CARE EDUCATION/TRAINING PROGRAM

## 2021-04-09 PROCEDURE — 36415 COLL VENOUS BLD VENIPUNCTURE: CPT | Mod: PO | Performed by: STUDENT IN AN ORGANIZED HEALTH CARE EDUCATION/TRAINING PROGRAM

## 2021-04-09 PROCEDURE — 82306 VITAMIN D 25 HYDROXY: CPT | Performed by: STUDENT IN AN ORGANIZED HEALTH CARE EDUCATION/TRAINING PROGRAM

## 2021-04-12 RX ORDER — ESZOPICLONE 2 MG/1
2 TABLET, FILM COATED ORAL NIGHTLY
Qty: 30 TABLET | Refills: 0 | Status: SHIPPED | OUTPATIENT
Start: 2021-04-12 | End: 2021-05-17

## 2021-04-20 ENCOUNTER — OFFICE VISIT (OUTPATIENT)
Dept: INTERNAL MEDICINE | Facility: CLINIC | Age: 61
End: 2021-04-20
Payer: COMMERCIAL

## 2021-04-20 VITALS
TEMPERATURE: 98 F | DIASTOLIC BLOOD PRESSURE: 76 MMHG | HEART RATE: 82 BPM | BODY MASS INDEX: 28.53 KG/M2 | WEIGHT: 167.13 LBS | HEIGHT: 64 IN | OXYGEN SATURATION: 98 % | SYSTOLIC BLOOD PRESSURE: 122 MMHG

## 2021-04-20 DIAGNOSIS — I15.2 HYPERTENSION ASSOCIATED WITH DIABETES: Chronic | ICD-10-CM

## 2021-04-20 DIAGNOSIS — E11.59 HYPERTENSION ASSOCIATED WITH DIABETES: Chronic | ICD-10-CM

## 2021-04-20 DIAGNOSIS — I65.23 CAROTID STENOSIS, BILATERAL: Chronic | ICD-10-CM

## 2021-04-20 DIAGNOSIS — E11.51 TYPE 2 DIABETES MELLITUS WITH DIABETIC PERIPHERAL ANGIOPATHY WITHOUT GANGRENE, WITHOUT LONG-TERM CURRENT USE OF INSULIN: Primary | ICD-10-CM

## 2021-04-20 DIAGNOSIS — E11.22 TYPE 2 DIABETES MELLITUS WITH STAGE 2 CHRONIC KIDNEY DISEASE AND HYPERTENSION: Chronic | ICD-10-CM

## 2021-04-20 DIAGNOSIS — E78.5 HYPERLIPIDEMIA ASSOCIATED WITH TYPE 2 DIABETES MELLITUS: Chronic | ICD-10-CM

## 2021-04-20 DIAGNOSIS — E11.69 HYPERLIPIDEMIA ASSOCIATED WITH TYPE 2 DIABETES MELLITUS: Chronic | ICD-10-CM

## 2021-04-20 DIAGNOSIS — H35.033 HYPERTENSIVE RETINOPATHY, BILATERAL: ICD-10-CM

## 2021-04-20 DIAGNOSIS — E11.42 DIABETIC PERIPHERAL NEUROPATHY ASSOCIATED WITH TYPE 2 DIABETES MELLITUS: ICD-10-CM

## 2021-04-20 DIAGNOSIS — I50.42 CHRONIC COMBINED SYSTOLIC AND DIASTOLIC CHF (CONGESTIVE HEART FAILURE): Chronic | ICD-10-CM

## 2021-04-20 DIAGNOSIS — N18.2 TYPE 2 DIABETES MELLITUS WITH STAGE 2 CHRONIC KIDNEY DISEASE AND HYPERTENSION: Chronic | ICD-10-CM

## 2021-04-20 DIAGNOSIS — I12.9 TYPE 2 DIABETES MELLITUS WITH STAGE 2 CHRONIC KIDNEY DISEASE AND HYPERTENSION: Chronic | ICD-10-CM

## 2021-04-20 LAB — GLUCOSE SERPL-MCNC: 269 MG/DL (ref 70–110)

## 2021-04-20 PROCEDURE — 82962 GLUCOSE BLOOD TEST: CPT | Mod: S$GLB,,, | Performed by: NURSE PRACTITIONER

## 2021-04-20 PROCEDURE — 3008F BODY MASS INDEX DOCD: CPT | Mod: CPTII,S$GLB,, | Performed by: NURSE PRACTITIONER

## 2021-04-20 PROCEDURE — 1126F PR PAIN SEVERITY QUANTIFIED, NO PAIN PRESENT: ICD-10-PCS | Mod: S$GLB,,, | Performed by: NURSE PRACTITIONER

## 2021-04-20 PROCEDURE — 99215 PR OFFICE/OUTPT VISIT, EST, LEVL V, 40-54 MIN: ICD-10-PCS | Mod: S$GLB,,, | Performed by: NURSE PRACTITIONER

## 2021-04-20 PROCEDURE — 99215 OFFICE O/P EST HI 40 MIN: CPT | Mod: S$GLB,,, | Performed by: NURSE PRACTITIONER

## 2021-04-20 PROCEDURE — 3052F HG A1C>EQUAL 8.0%<EQUAL 9.0%: CPT | Mod: CPTII,S$GLB,, | Performed by: NURSE PRACTITIONER

## 2021-04-20 PROCEDURE — 99999 PR PBB SHADOW E&M-EST. PATIENT-LVL IV: CPT | Mod: PBBFAC,,, | Performed by: NURSE PRACTITIONER

## 2021-04-20 PROCEDURE — 3008F PR BODY MASS INDEX (BMI) DOCUMENTED: ICD-10-PCS | Mod: CPTII,S$GLB,, | Performed by: NURSE PRACTITIONER

## 2021-04-20 PROCEDURE — 3052F PR MOST RECENT HEMOGLOBIN A1C LEVEL 8.0 - < 9.0%: ICD-10-PCS | Mod: CPTII,S$GLB,, | Performed by: NURSE PRACTITIONER

## 2021-04-20 PROCEDURE — 82962 POCT GLUCOSE, HAND-HELD DEVICE: ICD-10-PCS | Mod: S$GLB,,, | Performed by: NURSE PRACTITIONER

## 2021-04-20 PROCEDURE — 99999 PR PBB SHADOW E&M-EST. PATIENT-LVL IV: ICD-10-PCS | Mod: PBBFAC,,, | Performed by: NURSE PRACTITIONER

## 2021-04-20 PROCEDURE — 1126F AMNT PAIN NOTED NONE PRSNT: CPT | Mod: S$GLB,,, | Performed by: NURSE PRACTITIONER

## 2021-04-20 RX ORDER — EVOLOCUMAB 140 MG/ML
140 INJECTION, SOLUTION SUBCUTANEOUS
Qty: 2 ML | Refills: 6 | Status: SHIPPED | OUTPATIENT
Start: 2021-04-20 | End: 2021-12-27 | Stop reason: SDUPTHER

## 2021-04-20 RX ORDER — BLOOD-GLUCOSE SENSOR
3 EACH MISCELLANEOUS CONTINUOUS
Qty: 3 EACH | Refills: 11 | Status: SHIPPED | OUTPATIENT
Start: 2021-04-20 | End: 2021-06-25 | Stop reason: SDUPTHER

## 2021-04-20 RX ORDER — BLOOD-GLUCOSE TRANSMITTER
1 EACH MISCELLANEOUS CONTINUOUS
Qty: 1 EACH | Refills: 3 | Status: SHIPPED | OUTPATIENT
Start: 2021-04-20 | End: 2021-06-25 | Stop reason: SDUPTHER

## 2021-04-20 RX ORDER — PROMETHAZINE HYDROCHLORIDE 12.5 MG/1
12.5 TABLET ORAL EVERY 6 HOURS PRN
Qty: 30 TABLET | Refills: 1 | Status: SHIPPED | OUTPATIENT
Start: 2021-04-20 | End: 2021-09-22 | Stop reason: SDUPTHER

## 2021-04-20 RX ORDER — DAPAGLIFLOZIN AND METFORMIN HYDROCHLORIDE 5; 1000 MG/1; MG/1
1 TABLET, FILM COATED, EXTENDED RELEASE ORAL DAILY
Qty: 30 TABLET | Refills: 3 | Status: SHIPPED | OUTPATIENT
Start: 2021-04-20 | End: 2021-06-25

## 2021-04-20 RX ORDER — DULAGLUTIDE 3 MG/.5ML
3 INJECTION, SOLUTION SUBCUTANEOUS
Qty: 4 PEN | Refills: 6 | Status: SHIPPED | OUTPATIENT
Start: 2021-04-20 | End: 2021-09-22 | Stop reason: SDUPTHER

## 2021-04-23 ENCOUNTER — SPECIALTY PHARMACY (OUTPATIENT)
Dept: PHARMACY | Facility: CLINIC | Age: 61
End: 2021-04-23

## 2021-04-23 ENCOUNTER — TELEPHONE (OUTPATIENT)
Dept: PHARMACY | Facility: CLINIC | Age: 61
End: 2021-04-23

## 2021-05-05 ENCOUNTER — CLINICAL SUPPORT (OUTPATIENT)
Dept: DIABETES | Facility: CLINIC | Age: 61
End: 2021-05-05
Payer: COMMERCIAL

## 2021-05-05 DIAGNOSIS — I12.9 TYPE 2 DIABETES MELLITUS WITH STAGE 2 CHRONIC KIDNEY DISEASE AND HYPERTENSION: ICD-10-CM

## 2021-05-05 DIAGNOSIS — E11.51 TYPE 2 DIABETES MELLITUS WITH DIABETIC PERIPHERAL ANGIOPATHY WITHOUT GANGRENE, WITHOUT LONG-TERM CURRENT USE OF INSULIN: ICD-10-CM

## 2021-05-05 DIAGNOSIS — N18.2 TYPE 2 DIABETES MELLITUS WITH STAGE 2 CHRONIC KIDNEY DISEASE AND HYPERTENSION: ICD-10-CM

## 2021-05-05 DIAGNOSIS — E11.22 TYPE 2 DIABETES MELLITUS WITH STAGE 2 CHRONIC KIDNEY DISEASE AND HYPERTENSION: ICD-10-CM

## 2021-05-05 PROCEDURE — 99999 PR PBB SHADOW E&M-EST. PATIENT-LVL I: CPT | Mod: PBBFAC,,, | Performed by: DIETITIAN, REGISTERED

## 2021-05-05 PROCEDURE — G0108 DIAB MANAGE TRN  PER INDIV: HCPCS | Mod: S$GLB,,, | Performed by: DIETITIAN, REGISTERED

## 2021-05-05 PROCEDURE — G0108 PR DIAB MANAGE TRN  PER INDIV: ICD-10-PCS | Mod: S$GLB,,, | Performed by: DIETITIAN, REGISTERED

## 2021-05-05 PROCEDURE — 99999 PR PBB SHADOW E&M-EST. PATIENT-LVL I: ICD-10-PCS | Mod: PBBFAC,,, | Performed by: DIETITIAN, REGISTERED

## 2021-05-05 RX ORDER — INSULIN GLARGINE 300 U/ML
22 INJECTION, SOLUTION SUBCUTANEOUS NIGHTLY
Qty: 6 ML | Refills: 5 | Status: SHIPPED | OUTPATIENT
Start: 2021-05-05 | End: 2021-09-22

## 2021-05-07 ENCOUNTER — SPECIALTY PHARMACY (OUTPATIENT)
Dept: PHARMACY | Facility: CLINIC | Age: 61
End: 2021-05-07

## 2021-05-17 RX ORDER — ESZOPICLONE 2 MG/1
2 TABLET, FILM COATED ORAL NIGHTLY
Qty: 30 TABLET | Refills: 0 | Status: SHIPPED | OUTPATIENT
Start: 2021-05-17 | End: 2021-06-15 | Stop reason: SDUPTHER

## 2021-05-18 ENCOUNTER — OFFICE VISIT (OUTPATIENT)
Dept: INTERNAL MEDICINE | Facility: CLINIC | Age: 61
End: 2021-05-18
Payer: COMMERCIAL

## 2021-05-18 VITALS
OXYGEN SATURATION: 99 % | DIASTOLIC BLOOD PRESSURE: 80 MMHG | SYSTOLIC BLOOD PRESSURE: 134 MMHG | HEIGHT: 64 IN | WEIGHT: 165.81 LBS | BODY MASS INDEX: 28.31 KG/M2 | TEMPERATURE: 98 F | HEART RATE: 85 BPM | RESPIRATION RATE: 18 BRPM

## 2021-05-18 DIAGNOSIS — E11.22 TYPE 2 DIABETES MELLITUS WITH STAGE 2 CHRONIC KIDNEY DISEASE AND HYPERTENSION: Chronic | ICD-10-CM

## 2021-05-18 DIAGNOSIS — I12.9 TYPE 2 DIABETES MELLITUS WITH STAGE 2 CHRONIC KIDNEY DISEASE AND HYPERTENSION: Chronic | ICD-10-CM

## 2021-05-18 DIAGNOSIS — N18.2 TYPE 2 DIABETES MELLITUS WITH STAGE 2 CHRONIC KIDNEY DISEASE AND HYPERTENSION: Chronic | ICD-10-CM

## 2021-05-18 DIAGNOSIS — H53.8 BLURRED VISION: ICD-10-CM

## 2021-05-18 DIAGNOSIS — J02.9 SORE THROAT: Primary | ICD-10-CM

## 2021-05-18 DIAGNOSIS — H81.10 BENIGN PAROXYSMAL POSITIONAL VERTIGO, UNSPECIFIED LATERALITY: ICD-10-CM

## 2021-05-18 PROBLEM — I50.42 CHRONIC COMBINED SYSTOLIC AND DIASTOLIC CHF (CONGESTIVE HEART FAILURE): Chronic | Status: RESOLVED | Noted: 2019-07-02 | Resolved: 2021-05-18

## 2021-05-18 PROCEDURE — 3008F PR BODY MASS INDEX (BMI) DOCUMENTED: ICD-10-PCS | Mod: CPTII,S$GLB,, | Performed by: INTERNAL MEDICINE

## 2021-05-18 PROCEDURE — 99214 OFFICE O/P EST MOD 30 MIN: CPT | Mod: S$GLB,,, | Performed by: INTERNAL MEDICINE

## 2021-05-18 PROCEDURE — 99999 PR PBB SHADOW E&M-EST. PATIENT-LVL V: CPT | Mod: PBBFAC,,, | Performed by: INTERNAL MEDICINE

## 2021-05-18 PROCEDURE — 1126F AMNT PAIN NOTED NONE PRSNT: CPT | Mod: S$GLB,,, | Performed by: INTERNAL MEDICINE

## 2021-05-18 PROCEDURE — 99214 PR OFFICE/OUTPT VISIT, EST, LEVL IV, 30-39 MIN: ICD-10-PCS | Mod: S$GLB,,, | Performed by: INTERNAL MEDICINE

## 2021-05-18 PROCEDURE — 99999 PR PBB SHADOW E&M-EST. PATIENT-LVL V: ICD-10-PCS | Mod: PBBFAC,,, | Performed by: INTERNAL MEDICINE

## 2021-05-18 PROCEDURE — 3008F BODY MASS INDEX DOCD: CPT | Mod: CPTII,S$GLB,, | Performed by: INTERNAL MEDICINE

## 2021-05-18 PROCEDURE — 1126F PR PAIN SEVERITY QUANTIFIED, NO PAIN PRESENT: ICD-10-PCS | Mod: S$GLB,,, | Performed by: INTERNAL MEDICINE

## 2021-05-18 PROCEDURE — 3052F PR MOST RECENT HEMOGLOBIN A1C LEVEL 8.0 - < 9.0%: ICD-10-PCS | Mod: CPTII,S$GLB,, | Performed by: INTERNAL MEDICINE

## 2021-05-18 PROCEDURE — 3052F HG A1C>EQUAL 8.0%<EQUAL 9.0%: CPT | Mod: CPTII,S$GLB,, | Performed by: INTERNAL MEDICINE

## 2021-05-21 DIAGNOSIS — E11.22 TYPE 2 DIABETES MELLITUS WITH STAGE 2 CHRONIC KIDNEY DISEASE AND HYPERTENSION: ICD-10-CM

## 2021-05-21 DIAGNOSIS — N18.2 TYPE 2 DIABETES MELLITUS WITH STAGE 2 CHRONIC KIDNEY DISEASE AND HYPERTENSION: ICD-10-CM

## 2021-05-21 DIAGNOSIS — I12.9 TYPE 2 DIABETES MELLITUS WITH STAGE 2 CHRONIC KIDNEY DISEASE AND HYPERTENSION: ICD-10-CM

## 2021-05-21 RX ORDER — INSULIN ASPART 100 [IU]/ML
INJECTION, SOLUTION INTRAVENOUS; SUBCUTANEOUS
Qty: 30 SYRINGE | Refills: 4 | Status: SHIPPED | OUTPATIENT
Start: 2021-05-21 | End: 2021-08-03

## 2021-05-21 RX ORDER — INSULIN GLARGINE 300 U/ML
22 INJECTION, SOLUTION SUBCUTANEOUS NIGHTLY
Qty: 6 ML | Refills: 5 | Status: CANCELLED | OUTPATIENT
Start: 2021-05-21

## 2021-05-27 ENCOUNTER — TELEPHONE (OUTPATIENT)
Dept: INTERNAL MEDICINE | Facility: CLINIC | Age: 61
End: 2021-05-27

## 2021-05-29 ENCOUNTER — SPECIALTY PHARMACY (OUTPATIENT)
Dept: PHARMACY | Facility: CLINIC | Age: 61
End: 2021-05-29

## 2021-06-07 ENCOUNTER — PATIENT MESSAGE (OUTPATIENT)
Dept: INTERNAL MEDICINE | Facility: CLINIC | Age: 61
End: 2021-06-07

## 2021-06-16 RX ORDER — ESZOPICLONE 2 MG/1
2 TABLET, FILM COATED ORAL NIGHTLY
Qty: 30 TABLET | Refills: 0 | Status: SHIPPED | OUTPATIENT
Start: 2021-06-16 | End: 2021-07-20 | Stop reason: SDUPTHER

## 2021-06-20 NOTE — PROGRESS NOTES
Story of care at 815pm    Patient awaiting results of CT scan of the abdomen pelvis with IV contrast for abdominal discomfort in the setting of urostomy.  Patient has been administered Rocephin IV PB for suspected intra-abdominal infectious process.  CT scan of the abdomen pelvis reveals evidence of mild to moderate hydronephrosis of the left side without additional intra-abdominal findings.  The patient has undergone fluid resuscitation, supplementation of potassium orally, and has tolerated a full regular diet prior to disposition.  Moderately elevated total bilirubin has improved without intervention on repeat lab screening.  I have discussed with the patient the potential for further inpatient treatment with supplementation of potassium, IV antibiotics, and fluid resuscitation, however, the patient has declined such recommendations.  Emergency department findings and concerns have been discussed with patient and accompanying spouse, and all questions have been answered to the satisfaction.  She will be discharged home in improved condition with prescription for cephalexin to be taken 3 times daily for the next week.  Urine culture is notably pending at the time of disposition.  I have strongly recommended the patient follow up with her managing primary care physician next available within 72 hr, and return to the ED as needed for worsening of current condition, worsening fever, vomiting, or additional urgent concerns.  
Implemented All Fall Risk Interventions:  [place] to call system. Call bell, personal items and telephone within reach. Instruct patient to call for assistance. Room bathroom lighting operational. Non-slip footwear when patient is off stretcher. Physically safe environment: no spills, clutter or unnecessary equipment. Stretcher in lowest position, wheels locked, appropriate side rails in place. Provide visual cue, wrist band, yellow gown, etc. Monitor gait and stability. Monitor for mental status changes and reorient to person, place, and time. Review medications for side effects contributing to fall risk. Reinforce activity limits and safety measures with patient and family.

## 2021-06-23 ENCOUNTER — TELEPHONE (OUTPATIENT)
Dept: CARDIOLOGY | Facility: CLINIC | Age: 61
End: 2021-06-23

## 2021-06-25 ENCOUNTER — OFFICE VISIT (OUTPATIENT)
Dept: INTERNAL MEDICINE | Facility: CLINIC | Age: 61
End: 2021-06-25
Payer: COMMERCIAL

## 2021-06-25 VITALS
HEIGHT: 64 IN | RESPIRATION RATE: 16 BRPM | DIASTOLIC BLOOD PRESSURE: 80 MMHG | SYSTOLIC BLOOD PRESSURE: 120 MMHG | BODY MASS INDEX: 27.85 KG/M2 | TEMPERATURE: 98 F | HEART RATE: 76 BPM | WEIGHT: 163.13 LBS | OXYGEN SATURATION: 98 %

## 2021-06-25 DIAGNOSIS — E11.69 HYPERLIPIDEMIA ASSOCIATED WITH TYPE 2 DIABETES MELLITUS: Chronic | ICD-10-CM

## 2021-06-25 DIAGNOSIS — I15.2 HYPERTENSION ASSOCIATED WITH DIABETES: Chronic | ICD-10-CM

## 2021-06-25 DIAGNOSIS — I12.9 TYPE 2 DIABETES MELLITUS WITH STAGE 2 CHRONIC KIDNEY DISEASE AND HYPERTENSION: Chronic | ICD-10-CM

## 2021-06-25 DIAGNOSIS — N18.2 TYPE 2 DIABETES MELLITUS WITH STAGE 2 CHRONIC KIDNEY DISEASE AND HYPERTENSION: Chronic | ICD-10-CM

## 2021-06-25 DIAGNOSIS — Z74.09 IMPAIRED FUNCTIONAL MOBILITY AND ENDURANCE: ICD-10-CM

## 2021-06-25 DIAGNOSIS — I50.30 HEART FAILURE WITH PRESERVED EJECTION FRACTION, UNSPECIFIED HF CHRONICITY: Chronic | ICD-10-CM

## 2021-06-25 DIAGNOSIS — E11.22 TYPE 2 DIABETES MELLITUS WITH STAGE 2 CHRONIC KIDNEY DISEASE AND HYPERTENSION: Primary | Chronic | ICD-10-CM

## 2021-06-25 DIAGNOSIS — E11.22 TYPE 2 DIABETES MELLITUS WITH STAGE 2 CHRONIC KIDNEY DISEASE AND HYPERTENSION: Chronic | ICD-10-CM

## 2021-06-25 DIAGNOSIS — I65.23 CAROTID STENOSIS, BILATERAL: Chronic | ICD-10-CM

## 2021-06-25 DIAGNOSIS — I12.9 TYPE 2 DIABETES MELLITUS WITH STAGE 2 CHRONIC KIDNEY DISEASE AND HYPERTENSION: Primary | Chronic | ICD-10-CM

## 2021-06-25 DIAGNOSIS — E78.5 HYPERLIPIDEMIA ASSOCIATED WITH TYPE 2 DIABETES MELLITUS: Chronic | ICD-10-CM

## 2021-06-25 DIAGNOSIS — N18.2 TYPE 2 DIABETES MELLITUS WITH STAGE 2 CHRONIC KIDNEY DISEASE AND HYPERTENSION: Primary | Chronic | ICD-10-CM

## 2021-06-25 DIAGNOSIS — E11.59 HYPERTENSION ASSOCIATED WITH DIABETES: Chronic | ICD-10-CM

## 2021-06-25 PROCEDURE — 3052F PR MOST RECENT HEMOGLOBIN A1C LEVEL 8.0 - < 9.0%: ICD-10-PCS | Mod: CPTII,S$GLB,, | Performed by: NURSE PRACTITIONER

## 2021-06-25 PROCEDURE — 3008F PR BODY MASS INDEX (BMI) DOCUMENTED: ICD-10-PCS | Mod: CPTII,S$GLB,, | Performed by: NURSE PRACTITIONER

## 2021-06-25 PROCEDURE — 95251 CONT GLUC MNTR ANALYSIS I&R: CPT | Mod: S$GLB,,, | Performed by: NURSE PRACTITIONER

## 2021-06-25 PROCEDURE — 3008F BODY MASS INDEX DOCD: CPT | Mod: CPTII,S$GLB,, | Performed by: NURSE PRACTITIONER

## 2021-06-25 PROCEDURE — 99214 PR OFFICE/OUTPT VISIT, EST, LEVL IV, 30-39 MIN: ICD-10-PCS | Mod: S$GLB,,, | Performed by: NURSE PRACTITIONER

## 2021-06-25 PROCEDURE — 95249 CONT GLUC MNTR PT PROV EQP: CPT | Mod: S$GLB,,, | Performed by: NURSE PRACTITIONER

## 2021-06-25 PROCEDURE — 95251 PR GLUCOSE MONITOR, 72 HOUR, PHYS INTERP: ICD-10-PCS | Mod: S$GLB,,, | Performed by: NURSE PRACTITIONER

## 2021-06-25 PROCEDURE — 99214 OFFICE O/P EST MOD 30 MIN: CPT | Mod: S$GLB,,, | Performed by: NURSE PRACTITIONER

## 2021-06-25 PROCEDURE — 95249 PR GLUCOSE MONITORING, 72 HRS, SUB-Q SENSOR, PATIENT PROVIDED: ICD-10-PCS | Mod: S$GLB,,, | Performed by: NURSE PRACTITIONER

## 2021-06-25 PROCEDURE — 3052F HG A1C>EQUAL 8.0%<EQUAL 9.0%: CPT | Mod: CPTII,S$GLB,, | Performed by: NURSE PRACTITIONER

## 2021-06-25 PROCEDURE — 1126F AMNT PAIN NOTED NONE PRSNT: CPT | Mod: S$GLB,,, | Performed by: NURSE PRACTITIONER

## 2021-06-25 PROCEDURE — 99999 PR PBB SHADOW E&M-EST. PATIENT-LVL V: CPT | Mod: PBBFAC,,, | Performed by: NURSE PRACTITIONER

## 2021-06-25 PROCEDURE — 99999 PR PBB SHADOW E&M-EST. PATIENT-LVL V: ICD-10-PCS | Mod: PBBFAC,,, | Performed by: NURSE PRACTITIONER

## 2021-06-25 PROCEDURE — 1126F PR PAIN SEVERITY QUANTIFIED, NO PAIN PRESENT: ICD-10-PCS | Mod: S$GLB,,, | Performed by: NURSE PRACTITIONER

## 2021-06-25 RX ORDER — DAPAGLIFLOZIN 10 MG/1
10 TABLET, FILM COATED ORAL DAILY
Qty: 30 TABLET | Refills: 6 | Status: SHIPPED | OUTPATIENT
Start: 2021-06-25 | End: 2021-09-22 | Stop reason: SDUPTHER

## 2021-06-25 RX ORDER — BLOOD-GLUCOSE TRANSMITTER
1 EACH MISCELLANEOUS CONTINUOUS
Qty: 1 EACH | Refills: 3 | Status: SHIPPED | OUTPATIENT
Start: 2021-06-25 | End: 2021-07-14 | Stop reason: SDUPTHER

## 2021-06-25 RX ORDER — BLOOD-GLUCOSE SENSOR
3 EACH MISCELLANEOUS CONTINUOUS
Qty: 3 EACH | Refills: 11 | Status: SHIPPED | OUTPATIENT
Start: 2021-06-25 | End: 2021-07-14 | Stop reason: SDUPTHER

## 2021-06-25 RX ORDER — ATORVASTATIN CALCIUM 40 MG/1
40 TABLET, FILM COATED ORAL NIGHTLY
Qty: 90 TABLET | Refills: 3 | Status: SHIPPED | OUTPATIENT
Start: 2021-06-25 | End: 2022-03-11 | Stop reason: SDUPTHER

## 2021-06-26 ENCOUNTER — PATIENT OUTREACH (OUTPATIENT)
Dept: ADMINISTRATIVE | Facility: OTHER | Age: 61
End: 2021-06-26

## 2021-06-28 ENCOUNTER — OFFICE VISIT (OUTPATIENT)
Dept: CARDIOLOGY | Facility: CLINIC | Age: 61
End: 2021-06-28
Payer: COMMERCIAL

## 2021-06-28 ENCOUNTER — TELEPHONE (OUTPATIENT)
Dept: CARDIOLOGY | Facility: CLINIC | Age: 61
End: 2021-06-28

## 2021-06-28 VITALS
HEIGHT: 64 IN | WEIGHT: 166.31 LBS | BODY MASS INDEX: 28.39 KG/M2 | DIASTOLIC BLOOD PRESSURE: 79 MMHG | SYSTOLIC BLOOD PRESSURE: 173 MMHG | HEART RATE: 75 BPM

## 2021-06-28 DIAGNOSIS — I25.10 CORONARY ARTERY DISEASE INVOLVING NATIVE CORONARY ARTERY OF NATIVE HEART WITHOUT ANGINA PECTORIS: Primary | Chronic | ICD-10-CM

## 2021-06-28 DIAGNOSIS — E11.69 HYPERLIPIDEMIA ASSOCIATED WITH TYPE 2 DIABETES MELLITUS: ICD-10-CM

## 2021-06-28 DIAGNOSIS — Z95.5 S/P CORONARY ARTERY STENT PLACEMENT: ICD-10-CM

## 2021-06-28 DIAGNOSIS — N18.2 TYPE 2 DIABETES MELLITUS WITH STAGE 2 CHRONIC KIDNEY DISEASE AND HYPERTENSION: Chronic | ICD-10-CM

## 2021-06-28 DIAGNOSIS — I12.9 TYPE 2 DIABETES MELLITUS WITH STAGE 2 CHRONIC KIDNEY DISEASE AND HYPERTENSION: Chronic | ICD-10-CM

## 2021-06-28 DIAGNOSIS — G47.30 SLEEP APNEA, UNSPECIFIED TYPE: Chronic | ICD-10-CM

## 2021-06-28 DIAGNOSIS — E78.5 HYPERLIPIDEMIA ASSOCIATED WITH TYPE 2 DIABETES MELLITUS: ICD-10-CM

## 2021-06-28 DIAGNOSIS — E78.5 HYPERLIPIDEMIA ASSOCIATED WITH TYPE 2 DIABETES MELLITUS: Chronic | ICD-10-CM

## 2021-06-28 DIAGNOSIS — I50.30 HEART FAILURE WITH PRESERVED EJECTION FRACTION, UNSPECIFIED HF CHRONICITY: Chronic | ICD-10-CM

## 2021-06-28 DIAGNOSIS — K76.0 FATTY LIVER DISEASE, NONALCOHOLIC: ICD-10-CM

## 2021-06-28 DIAGNOSIS — E11.59 HYPERTENSION ASSOCIATED WITH DIABETES: Chronic | ICD-10-CM

## 2021-06-28 DIAGNOSIS — I65.23 CAROTID STENOSIS, BILATERAL: Chronic | ICD-10-CM

## 2021-06-28 DIAGNOSIS — E11.69 HYPERLIPIDEMIA ASSOCIATED WITH TYPE 2 DIABETES MELLITUS: Chronic | ICD-10-CM

## 2021-06-28 DIAGNOSIS — I65.23 CAROTID STENOSIS, BILATERAL: Primary | ICD-10-CM

## 2021-06-28 DIAGNOSIS — R00.1 BRADYCARDIA: ICD-10-CM

## 2021-06-28 DIAGNOSIS — E11.22 TYPE 2 DIABETES MELLITUS WITH STAGE 2 CHRONIC KIDNEY DISEASE AND HYPERTENSION: Chronic | ICD-10-CM

## 2021-06-28 DIAGNOSIS — I25.10 CORONARY ARTERY DISEASE INVOLVING NATIVE CORONARY ARTERY OF NATIVE HEART WITHOUT ANGINA PECTORIS: ICD-10-CM

## 2021-06-28 DIAGNOSIS — I25.2 HISTORY OF NON-ST ELEVATION MYOCARDIAL INFARCTION (NSTEMI): Chronic | ICD-10-CM

## 2021-06-28 DIAGNOSIS — I15.2 HYPERTENSION ASSOCIATED WITH DIABETES: Chronic | ICD-10-CM

## 2021-06-28 PROCEDURE — 3052F PR MOST RECENT HEMOGLOBIN A1C LEVEL 8.0 - < 9.0%: ICD-10-PCS | Mod: CPTII,S$GLB,, | Performed by: INTERNAL MEDICINE

## 2021-06-28 PROCEDURE — 3008F BODY MASS INDEX DOCD: CPT | Mod: CPTII,S$GLB,, | Performed by: INTERNAL MEDICINE

## 2021-06-28 PROCEDURE — 99214 PR OFFICE/OUTPT VISIT, EST, LEVL IV, 30-39 MIN: ICD-10-PCS | Mod: S$GLB,,, | Performed by: INTERNAL MEDICINE

## 2021-06-28 PROCEDURE — 3052F HG A1C>EQUAL 8.0%<EQUAL 9.0%: CPT | Mod: CPTII,S$GLB,, | Performed by: INTERNAL MEDICINE

## 2021-06-28 PROCEDURE — 1125F AMNT PAIN NOTED PAIN PRSNT: CPT | Mod: S$GLB,,, | Performed by: INTERNAL MEDICINE

## 2021-06-28 PROCEDURE — 99999 PR PBB SHADOW E&M-EST. PATIENT-LVL V: ICD-10-PCS | Mod: PBBFAC,,, | Performed by: INTERNAL MEDICINE

## 2021-06-28 PROCEDURE — 1125F PR PAIN SEVERITY QUANTIFIED, PAIN PRESENT: ICD-10-PCS | Mod: S$GLB,,, | Performed by: INTERNAL MEDICINE

## 2021-06-28 PROCEDURE — 99214 OFFICE O/P EST MOD 30 MIN: CPT | Mod: S$GLB,,, | Performed by: INTERNAL MEDICINE

## 2021-06-28 PROCEDURE — 3008F PR BODY MASS INDEX (BMI) DOCUMENTED: ICD-10-PCS | Mod: CPTII,S$GLB,, | Performed by: INTERNAL MEDICINE

## 2021-06-28 PROCEDURE — 99999 PR PBB SHADOW E&M-EST. PATIENT-LVL V: CPT | Mod: PBBFAC,,, | Performed by: INTERNAL MEDICINE

## 2021-07-06 ENCOUNTER — SPECIALTY PHARMACY (OUTPATIENT)
Dept: PHARMACY | Facility: CLINIC | Age: 61
End: 2021-07-06

## 2021-07-13 ENCOUNTER — PATIENT MESSAGE (OUTPATIENT)
Dept: INTERNAL MEDICINE | Facility: CLINIC | Age: 61
End: 2021-07-13

## 2021-07-13 ENCOUNTER — PATIENT MESSAGE (OUTPATIENT)
Dept: ADMINISTRATIVE | Facility: OTHER | Age: 61
End: 2021-07-13

## 2021-07-13 DIAGNOSIS — N18.2 TYPE 2 DIABETES MELLITUS WITH STAGE 2 CHRONIC KIDNEY DISEASE AND HYPERTENSION: ICD-10-CM

## 2021-07-13 DIAGNOSIS — E11.22 TYPE 2 DIABETES MELLITUS WITH STAGE 2 CHRONIC KIDNEY DISEASE AND HYPERTENSION: ICD-10-CM

## 2021-07-13 DIAGNOSIS — I12.9 TYPE 2 DIABETES MELLITUS WITH STAGE 2 CHRONIC KIDNEY DISEASE AND HYPERTENSION: ICD-10-CM

## 2021-07-14 ENCOUNTER — PATIENT MESSAGE (OUTPATIENT)
Dept: INTERNAL MEDICINE | Facility: CLINIC | Age: 61
End: 2021-07-14

## 2021-07-14 DIAGNOSIS — I12.9 TYPE 2 DIABETES MELLITUS WITH STAGE 2 CHRONIC KIDNEY DISEASE AND HYPERTENSION: Chronic | ICD-10-CM

## 2021-07-14 DIAGNOSIS — E11.22 TYPE 2 DIABETES MELLITUS WITH STAGE 2 CHRONIC KIDNEY DISEASE AND HYPERTENSION: Chronic | ICD-10-CM

## 2021-07-14 DIAGNOSIS — N18.2 TYPE 2 DIABETES MELLITUS WITH STAGE 2 CHRONIC KIDNEY DISEASE AND HYPERTENSION: Chronic | ICD-10-CM

## 2021-07-14 RX ORDER — BLOOD-GLUCOSE SENSOR
3 EACH MISCELLANEOUS CONTINUOUS
Qty: 3 EACH | Refills: 11 | Status: SHIPPED | OUTPATIENT
Start: 2021-07-14 | End: 2021-07-20 | Stop reason: SDUPTHER

## 2021-07-14 RX ORDER — BLOOD-GLUCOSE,RECEIVER,CONT
1 EACH MISCELLANEOUS CONTINUOUS PRN
Qty: 1 EACH | Status: SHIPPED | OUTPATIENT
Start: 2021-07-14 | End: 2022-08-10

## 2021-07-14 RX ORDER — BLOOD-GLUCOSE TRANSMITTER
1 EACH MISCELLANEOUS CONTINUOUS
Qty: 1 EACH | Refills: 3 | Status: SHIPPED | OUTPATIENT
Start: 2021-07-14 | End: 2021-07-20 | Stop reason: SDUPTHER

## 2021-07-15 RX ORDER — ESCITALOPRAM OXALATE 10 MG/1
TABLET ORAL
Qty: 90 TABLET | Refills: 2 | Status: SHIPPED | OUTPATIENT
Start: 2021-07-15 | End: 2022-04-21 | Stop reason: SDUPTHER

## 2021-07-15 RX ORDER — CLOPIDOGREL BISULFATE 75 MG/1
TABLET ORAL
Qty: 90 TABLET | Refills: 2 | Status: SHIPPED | OUTPATIENT
Start: 2021-07-15 | End: 2022-03-13 | Stop reason: SDUPTHER

## 2021-07-15 RX ORDER — FUROSEMIDE 40 MG/1
TABLET ORAL
Qty: 90 TABLET | Refills: 2 | Status: SHIPPED | OUTPATIENT
Start: 2021-07-15 | End: 2021-08-16 | Stop reason: SINTOL

## 2021-07-19 ENCOUNTER — PATIENT MESSAGE (OUTPATIENT)
Dept: INTERNAL MEDICINE | Facility: CLINIC | Age: 61
End: 2021-07-19

## 2021-07-20 DIAGNOSIS — N18.2 TYPE 2 DIABETES MELLITUS WITH STAGE 2 CHRONIC KIDNEY DISEASE AND HYPERTENSION: ICD-10-CM

## 2021-07-20 DIAGNOSIS — E11.22 TYPE 2 DIABETES MELLITUS WITH STAGE 2 CHRONIC KIDNEY DISEASE AND HYPERTENSION: ICD-10-CM

## 2021-07-20 DIAGNOSIS — I12.9 TYPE 2 DIABETES MELLITUS WITH STAGE 2 CHRONIC KIDNEY DISEASE AND HYPERTENSION: ICD-10-CM

## 2021-07-20 RX ORDER — BLOOD-GLUCOSE SENSOR
3 EACH MISCELLANEOUS CONTINUOUS
Qty: 3 EACH | Refills: 11 | Status: SHIPPED | OUTPATIENT
Start: 2021-07-20 | End: 2021-09-19 | Stop reason: SDUPTHER

## 2021-07-20 RX ORDER — ESZOPICLONE 2 MG/1
2 TABLET, FILM COATED ORAL NIGHTLY
Qty: 30 TABLET | Refills: 0 | Status: SHIPPED | OUTPATIENT
Start: 2021-07-20 | End: 2021-08-25 | Stop reason: SDUPTHER

## 2021-07-20 RX ORDER — BLOOD-GLUCOSE TRANSMITTER
1 EACH MISCELLANEOUS CONTINUOUS
Qty: 1 EACH | Refills: 3 | Status: SHIPPED | OUTPATIENT
Start: 2021-07-20 | End: 2022-08-10

## 2021-08-03 ENCOUNTER — TELEPHONE (OUTPATIENT)
Dept: INTERNAL MEDICINE | Facility: CLINIC | Age: 61
End: 2021-08-03

## 2021-08-03 RX ORDER — INSULIN LISPRO 100 [IU]/ML
INJECTION, SOLUTION INTRAVENOUS; SUBCUTANEOUS
Qty: 24 ML | Refills: 3 | Status: SHIPPED | OUTPATIENT
Start: 2021-08-03 | End: 2021-09-22

## 2021-08-09 ENCOUNTER — TELEPHONE (OUTPATIENT)
Dept: NEUROSURGERY | Facility: CLINIC | Age: 61
End: 2021-08-09

## 2021-08-09 DIAGNOSIS — M54.9 BACK PAIN, UNSPECIFIED BACK LOCATION, UNSPECIFIED BACK PAIN LATERALITY, UNSPECIFIED CHRONICITY: Primary | ICD-10-CM

## 2021-08-09 PROCEDURE — 99453 PR REMOTE MONITR, PHYSIOL PARAM, INITIAL: ICD-10-PCS | Mod: S$GLB,,, | Performed by: INTERNAL MEDICINE

## 2021-08-09 PROCEDURE — 99453 REM MNTR PHYSIOL PARAM SETUP: CPT | Mod: S$GLB,,, | Performed by: INTERNAL MEDICINE

## 2021-08-09 RX ORDER — METHYLPREDNISOLONE 4 MG/1
TABLET ORAL
Qty: 1 PACKAGE | Refills: 0 | Status: SHIPPED | OUTPATIENT
Start: 2021-08-09 | End: 2021-08-16 | Stop reason: ALTCHOICE

## 2021-08-11 ENCOUNTER — PATIENT OUTREACH (OUTPATIENT)
Dept: ADMINISTRATIVE | Facility: OTHER | Age: 61
End: 2021-08-11

## 2021-08-13 ENCOUNTER — TELEPHONE (OUTPATIENT)
Dept: NEUROSURGERY | Facility: CLINIC | Age: 61
End: 2021-08-13

## 2021-08-13 ENCOUNTER — HOSPITAL ENCOUNTER (OUTPATIENT)
Dept: RADIOLOGY | Facility: HOSPITAL | Age: 61
Discharge: HOME | End: 2021-08-13
Attending: PHYSICIAN ASSISTANT
Payer: COMMERCIAL

## 2021-08-13 ENCOUNTER — OFFICE VISIT (OUTPATIENT)
Dept: NEUROSURGERY | Facility: CLINIC | Age: 61
End: 2021-08-13
Payer: COMMERCIAL

## 2021-08-13 ENCOUNTER — TELEPHONE (OUTPATIENT)
Dept: INTERNAL MEDICINE | Facility: CLINIC | Age: 61
End: 2021-08-13

## 2021-08-13 VITALS — BODY MASS INDEX: 28.38 KG/M2 | HEIGHT: 64 IN | WEIGHT: 166.25 LBS

## 2021-08-13 DIAGNOSIS — M54.9 BACK PAIN, UNSPECIFIED BACK LOCATION, UNSPECIFIED BACK PAIN LATERALITY, UNSPECIFIED CHRONICITY: ICD-10-CM

## 2021-08-13 DIAGNOSIS — M53.3 SACROILIAC JOINT DYSFUNCTION OF RIGHT SIDE: Primary | ICD-10-CM

## 2021-08-13 DIAGNOSIS — Z98.1 S/P LUMBAR SPINAL FUSION: ICD-10-CM

## 2021-08-13 PROCEDURE — 99999 PR PBB SHADOW E&M-EST. PATIENT-LVL III: CPT | Mod: PBBFAC,,, | Performed by: PHYSICIAN ASSISTANT

## 2021-08-13 PROCEDURE — 72110 X-RAY EXAM L-2 SPINE 4/>VWS: CPT | Mod: 26,,, | Performed by: RADIOLOGY

## 2021-08-13 PROCEDURE — 1125F PR PAIN SEVERITY QUANTIFIED, PAIN PRESENT: ICD-10-PCS | Mod: CPTII,S$GLB,, | Performed by: PHYSICIAN ASSISTANT

## 2021-08-13 PROCEDURE — 1160F PR REVIEW ALL MEDS BY PRESCRIBER/CLIN PHARMACIST DOCUMENTED: ICD-10-PCS | Mod: CPTII,S$GLB,, | Performed by: PHYSICIAN ASSISTANT

## 2021-08-13 PROCEDURE — 1159F PR MEDICATION LIST DOCUMENTED IN MEDICAL RECORD: ICD-10-PCS | Mod: CPTII,S$GLB,, | Performed by: PHYSICIAN ASSISTANT

## 2021-08-13 PROCEDURE — 3008F PR BODY MASS INDEX (BMI) DOCUMENTED: ICD-10-PCS | Mod: CPTII,S$GLB,, | Performed by: PHYSICIAN ASSISTANT

## 2021-08-13 PROCEDURE — 1125F AMNT PAIN NOTED PAIN PRSNT: CPT | Mod: CPTII,S$GLB,, | Performed by: PHYSICIAN ASSISTANT

## 2021-08-13 PROCEDURE — 99214 PR OFFICE/OUTPT VISIT, EST, LEVL IV, 30-39 MIN: ICD-10-PCS | Mod: S$GLB,,, | Performed by: PHYSICIAN ASSISTANT

## 2021-08-13 PROCEDURE — 3052F HG A1C>EQUAL 8.0%<EQUAL 9.0%: CPT | Mod: CPTII,S$GLB,, | Performed by: PHYSICIAN ASSISTANT

## 2021-08-13 PROCEDURE — 3008F BODY MASS INDEX DOCD: CPT | Mod: CPTII,S$GLB,, | Performed by: PHYSICIAN ASSISTANT

## 2021-08-13 PROCEDURE — 99999 PR PBB SHADOW E&M-EST. PATIENT-LVL III: ICD-10-PCS | Mod: PBBFAC,,, | Performed by: PHYSICIAN ASSISTANT

## 2021-08-13 PROCEDURE — 1159F MED LIST DOCD IN RCRD: CPT | Mod: CPTII,S$GLB,, | Performed by: PHYSICIAN ASSISTANT

## 2021-08-13 PROCEDURE — 99214 OFFICE O/P EST MOD 30 MIN: CPT | Mod: S$GLB,,, | Performed by: PHYSICIAN ASSISTANT

## 2021-08-13 PROCEDURE — 1160F RVW MEDS BY RX/DR IN RCRD: CPT | Mod: CPTII,S$GLB,, | Performed by: PHYSICIAN ASSISTANT

## 2021-08-13 PROCEDURE — 72110 X-RAY EXAM L-2 SPINE 4/>VWS: CPT | Mod: TC,PN

## 2021-08-13 PROCEDURE — 3052F PR MOST RECENT HEMOGLOBIN A1C LEVEL 8.0 - < 9.0%: ICD-10-PCS | Mod: CPTII,S$GLB,, | Performed by: PHYSICIAN ASSISTANT

## 2021-08-13 PROCEDURE — 72110 XR LUMBAR SPINE AP AND LAT WITH FLEX/EXT: ICD-10-PCS | Mod: 26,,, | Performed by: RADIOLOGY

## 2021-08-16 ENCOUNTER — SPECIALTY PHARMACY (OUTPATIENT)
Dept: PHARMACY | Facility: CLINIC | Age: 61
End: 2021-08-16

## 2021-08-17 ENCOUNTER — PATIENT MESSAGE (OUTPATIENT)
Dept: PHARMACY | Facility: CLINIC | Age: 61
End: 2021-08-17

## 2021-08-17 ENCOUNTER — TELEPHONE (OUTPATIENT)
Dept: NEUROSURGERY | Facility: CLINIC | Age: 61
End: 2021-08-17

## 2021-08-17 DIAGNOSIS — Z41.9 SURGERY, ELECTIVE: Primary | ICD-10-CM

## 2021-08-17 DIAGNOSIS — M53.3 SACROILIAC JOINT DYSFUNCTION: ICD-10-CM

## 2021-08-18 ENCOUNTER — TELEPHONE (OUTPATIENT)
Dept: INTERNAL MEDICINE | Facility: CLINIC | Age: 61
End: 2021-08-18

## 2021-08-26 ENCOUNTER — TELEPHONE (OUTPATIENT)
Dept: NEUROSURGERY | Facility: CLINIC | Age: 61
End: 2021-08-26

## 2021-08-26 RX ORDER — ESZOPICLONE 2 MG/1
2 TABLET, FILM COATED ORAL NIGHTLY
Qty: 30 TABLET | Refills: 0 | Status: SHIPPED | OUTPATIENT
Start: 2021-08-26 | End: 2021-09-27 | Stop reason: SDUPTHER

## 2021-08-27 ENCOUNTER — PATIENT MESSAGE (OUTPATIENT)
Dept: INTERNAL MEDICINE | Facility: CLINIC | Age: 61
End: 2021-08-27

## 2021-08-31 PROCEDURE — 99457 RPM TX MGMT 1ST 20 MIN: CPT | Mod: S$GLB,,, | Performed by: INTERNAL MEDICINE

## 2021-08-31 PROCEDURE — 99457 PR MONITORING, PHYSIOL PARAM, REMOTE, 1ST 20 MINS, PER MONTH: ICD-10-PCS | Mod: S$GLB,,, | Performed by: INTERNAL MEDICINE

## 2021-09-16 ENCOUNTER — LAB VISIT (OUTPATIENT)
Dept: LAB | Facility: HOSPITAL | Age: 61
End: 2021-09-16
Payer: COMMERCIAL

## 2021-09-16 DIAGNOSIS — N18.2 TYPE 2 DIABETES MELLITUS WITH STAGE 2 CHRONIC KIDNEY DISEASE AND HYPERTENSION: Chronic | ICD-10-CM

## 2021-09-16 DIAGNOSIS — N28.1 COMPLEX RENAL CYST: ICD-10-CM

## 2021-09-16 DIAGNOSIS — E11.59 HYPERTENSION ASSOCIATED WITH DIABETES: Chronic | ICD-10-CM

## 2021-09-16 DIAGNOSIS — E11.69 HYPERLIPIDEMIA ASSOCIATED WITH TYPE 2 DIABETES MELLITUS: Chronic | ICD-10-CM

## 2021-09-16 DIAGNOSIS — I65.23 CAROTID STENOSIS, BILATERAL: ICD-10-CM

## 2021-09-16 DIAGNOSIS — E78.5 HYPERLIPIDEMIA ASSOCIATED WITH TYPE 2 DIABETES MELLITUS: Chronic | ICD-10-CM

## 2021-09-16 DIAGNOSIS — I15.2 HYPERTENSION ASSOCIATED WITH DIABETES: Chronic | ICD-10-CM

## 2021-09-16 DIAGNOSIS — I25.10 CORONARY ARTERY DISEASE INVOLVING NATIVE CORONARY ARTERY OF NATIVE HEART WITHOUT ANGINA PECTORIS: ICD-10-CM

## 2021-09-16 DIAGNOSIS — I12.9 TYPE 2 DIABETES MELLITUS WITH STAGE 2 CHRONIC KIDNEY DISEASE AND HYPERTENSION: Chronic | ICD-10-CM

## 2021-09-16 DIAGNOSIS — E11.22 TYPE 2 DIABETES MELLITUS WITH STAGE 2 CHRONIC KIDNEY DISEASE AND HYPERTENSION: Chronic | ICD-10-CM

## 2021-09-16 LAB
ALBUMIN SERPL BCP-MCNC: 3.8 G/DL (ref 3.5–5.2)
ALBUMIN SERPL BCP-MCNC: 3.8 G/DL (ref 3.5–5.2)
ALP SERPL-CCNC: 107 U/L (ref 55–135)
ALP SERPL-CCNC: 107 U/L (ref 55–135)
ALT SERPL W/O P-5'-P-CCNC: 16 U/L (ref 10–44)
ALT SERPL W/O P-5'-P-CCNC: 16 U/L (ref 10–44)
ANION GAP SERPL CALC-SCNC: 10 MMOL/L (ref 8–16)
ANION GAP SERPL CALC-SCNC: 10 MMOL/L (ref 8–16)
AST SERPL-CCNC: 22 U/L (ref 10–40)
AST SERPL-CCNC: 22 U/L (ref 10–40)
BILIRUB DIRECT SERPL-MCNC: 0.3 MG/DL (ref 0.1–0.3)
BILIRUB SERPL-MCNC: 0.9 MG/DL (ref 0.1–1)
BILIRUB SERPL-MCNC: 0.9 MG/DL (ref 0.1–1)
BUN SERPL-MCNC: 17 MG/DL (ref 6–20)
BUN SERPL-MCNC: 17 MG/DL (ref 6–20)
CALCIUM SERPL-MCNC: 9.8 MG/DL (ref 8.7–10.5)
CALCIUM SERPL-MCNC: 9.8 MG/DL (ref 8.7–10.5)
CHLORIDE SERPL-SCNC: 114 MMOL/L (ref 95–110)
CHLORIDE SERPL-SCNC: 114 MMOL/L (ref 95–110)
CHOLEST SERPL-MCNC: 91 MG/DL (ref 120–199)
CHOLEST/HDLC SERPL: 2.8 {RATIO} (ref 2–5)
CO2 SERPL-SCNC: 22 MMOL/L (ref 23–29)
CO2 SERPL-SCNC: 22 MMOL/L (ref 23–29)
CREAT SERPL-MCNC: 1 MG/DL (ref 0.5–1.4)
CREAT SERPL-MCNC: 1 MG/DL (ref 0.5–1.4)
EST. GFR  (AFRICAN AMERICAN): >60 ML/MIN/1.73 M^2
EST. GFR  (AFRICAN AMERICAN): >60 ML/MIN/1.73 M^2
EST. GFR  (NON AFRICAN AMERICAN): >60 ML/MIN/1.73 M^2
EST. GFR  (NON AFRICAN AMERICAN): >60 ML/MIN/1.73 M^2
ESTIMATED AVG GLUCOSE: 171 MG/DL (ref 68–131)
GLUCOSE SERPL-MCNC: 135 MG/DL (ref 70–110)
GLUCOSE SERPL-MCNC: 135 MG/DL (ref 70–110)
HBA1C MFR BLD: 7.6 % (ref 4–5.6)
HDLC SERPL-MCNC: 32 MG/DL (ref 40–75)
HDLC SERPL: 35.2 % (ref 20–50)
LDLC SERPL CALC-MCNC: 38.6 MG/DL (ref 63–159)
NONHDLC SERPL-MCNC: 59 MG/DL
POTASSIUM SERPL-SCNC: 4.5 MMOL/L (ref 3.5–5.1)
POTASSIUM SERPL-SCNC: 4.5 MMOL/L (ref 3.5–5.1)
PROT SERPL-MCNC: 7.5 G/DL (ref 6–8.4)
PROT SERPL-MCNC: 7.5 G/DL (ref 6–8.4)
SODIUM SERPL-SCNC: 146 MMOL/L (ref 136–145)
SODIUM SERPL-SCNC: 146 MMOL/L (ref 136–145)
TRIGL SERPL-MCNC: 102 MG/DL (ref 30–150)

## 2021-09-16 PROCEDURE — 80053 COMPREHEN METABOLIC PANEL: CPT | Performed by: NURSE PRACTITIONER

## 2021-09-16 PROCEDURE — 36415 COLL VENOUS BLD VENIPUNCTURE: CPT | Performed by: NURSE PRACTITIONER

## 2021-09-16 PROCEDURE — 80076 HEPATIC FUNCTION PANEL: CPT | Performed by: INTERNAL MEDICINE

## 2021-09-16 PROCEDURE — 80061 LIPID PANEL: CPT | Performed by: NURSE PRACTITIONER

## 2021-09-16 PROCEDURE — 83036 HEMOGLOBIN GLYCOSYLATED A1C: CPT | Performed by: NURSE PRACTITIONER

## 2021-09-17 ENCOUNTER — TELEPHONE (OUTPATIENT)
Dept: CARDIOLOGY | Facility: CLINIC | Age: 61
End: 2021-09-17

## 2021-09-22 ENCOUNTER — TELEPHONE (OUTPATIENT)
Dept: INTERNAL MEDICINE | Facility: CLINIC | Age: 61
End: 2021-09-22

## 2021-09-22 ENCOUNTER — PATIENT MESSAGE (OUTPATIENT)
Dept: INTERNAL MEDICINE | Facility: CLINIC | Age: 61
End: 2021-09-22

## 2021-09-22 ENCOUNTER — OFFICE VISIT (OUTPATIENT)
Dept: INTERNAL MEDICINE | Facility: CLINIC | Age: 61
End: 2021-09-22
Payer: COMMERCIAL

## 2021-09-22 ENCOUNTER — ANESTHESIA EVENT (OUTPATIENT)
Dept: SURGERY | Facility: HOSPITAL | Age: 61
End: 2021-09-22
Payer: COMMERCIAL

## 2021-09-22 DIAGNOSIS — I15.2 HYPERTENSION ASSOCIATED WITH DIABETES: Chronic | ICD-10-CM

## 2021-09-22 DIAGNOSIS — E11.59 HYPERTENSION ASSOCIATED WITH DIABETES: Chronic | ICD-10-CM

## 2021-09-22 DIAGNOSIS — N18.2 TYPE 2 DIABETES MELLITUS WITH STAGE 2 CHRONIC KIDNEY DISEASE AND HYPERTENSION: ICD-10-CM

## 2021-09-22 DIAGNOSIS — I12.9 TYPE 2 DIABETES MELLITUS WITH STAGE 2 CHRONIC KIDNEY DISEASE AND HYPERTENSION: Primary | ICD-10-CM

## 2021-09-22 DIAGNOSIS — I25.10 CORONARY ARTERY DISEASE INVOLVING NATIVE CORONARY ARTERY OF NATIVE HEART WITHOUT ANGINA PECTORIS: Chronic | ICD-10-CM

## 2021-09-22 DIAGNOSIS — E11.42 DIABETIC PERIPHERAL NEUROPATHY ASSOCIATED WITH TYPE 2 DIABETES MELLITUS: ICD-10-CM

## 2021-09-22 DIAGNOSIS — I12.9 TYPE 2 DIABETES MELLITUS WITH STAGE 2 CHRONIC KIDNEY DISEASE AND HYPERTENSION: ICD-10-CM

## 2021-09-22 DIAGNOSIS — Z74.09 IMPAIRED FUNCTIONAL MOBILITY AND ENDURANCE: ICD-10-CM

## 2021-09-22 DIAGNOSIS — E11.22 TYPE 2 DIABETES MELLITUS WITH STAGE 2 CHRONIC KIDNEY DISEASE AND HYPERTENSION: ICD-10-CM

## 2021-09-22 DIAGNOSIS — I65.23 CAROTID STENOSIS, BILATERAL: Chronic | ICD-10-CM

## 2021-09-22 DIAGNOSIS — I50.30 HEART FAILURE WITH PRESERVED EJECTION FRACTION, UNSPECIFIED HF CHRONICITY: Chronic | ICD-10-CM

## 2021-09-22 DIAGNOSIS — E11.69 HYPERLIPIDEMIA ASSOCIATED WITH TYPE 2 DIABETES MELLITUS: ICD-10-CM

## 2021-09-22 DIAGNOSIS — N18.2 TYPE 2 DIABETES MELLITUS WITH STAGE 2 CHRONIC KIDNEY DISEASE AND HYPERTENSION: Primary | ICD-10-CM

## 2021-09-22 DIAGNOSIS — E78.5 HYPERLIPIDEMIA ASSOCIATED WITH TYPE 2 DIABETES MELLITUS: ICD-10-CM

## 2021-09-22 DIAGNOSIS — E11.22 TYPE 2 DIABETES MELLITUS WITH STAGE 2 CHRONIC KIDNEY DISEASE AND HYPERTENSION: Primary | ICD-10-CM

## 2021-09-22 PROCEDURE — 99214 OFFICE O/P EST MOD 30 MIN: CPT | Mod: 95,,, | Performed by: NURSE PRACTITIONER

## 2021-09-22 PROCEDURE — 1159F MED LIST DOCD IN RCRD: CPT | Mod: CPTII,95,, | Performed by: NURSE PRACTITIONER

## 2021-09-22 PROCEDURE — 3066F PR DOCUMENTATION OF TREATMENT FOR NEPHROPATHY: ICD-10-PCS | Mod: CPTII,95,, | Performed by: NURSE PRACTITIONER

## 2021-09-22 PROCEDURE — 3066F NEPHROPATHY DOC TX: CPT | Mod: CPTII,95,, | Performed by: NURSE PRACTITIONER

## 2021-09-22 PROCEDURE — 3051F HG A1C>EQUAL 7.0%<8.0%: CPT | Mod: CPTII,95,, | Performed by: NURSE PRACTITIONER

## 2021-09-22 PROCEDURE — 3051F PR MOST RECENT HEMOGLOBIN A1C LEVEL 7.0 - < 8.0%: ICD-10-PCS | Mod: CPTII,95,, | Performed by: NURSE PRACTITIONER

## 2021-09-22 PROCEDURE — 3060F POS MICROALBUMINURIA REV: CPT | Mod: CPTII,95,, | Performed by: NURSE PRACTITIONER

## 2021-09-22 PROCEDURE — 3060F PR POS MICROALBUMINURIA RESULT DOCUMENTED/REVIEW: ICD-10-PCS | Mod: CPTII,95,, | Performed by: NURSE PRACTITIONER

## 2021-09-22 PROCEDURE — 4010F ACE/ARB THERAPY RXD/TAKEN: CPT | Mod: CPTII,95,, | Performed by: NURSE PRACTITIONER

## 2021-09-22 PROCEDURE — 4010F PR ACE/ARB THEARPY RXD/TAKEN: ICD-10-PCS | Mod: CPTII,95,, | Performed by: NURSE PRACTITIONER

## 2021-09-22 PROCEDURE — 1160F RVW MEDS BY RX/DR IN RCRD: CPT | Mod: CPTII,95,, | Performed by: NURSE PRACTITIONER

## 2021-09-22 PROCEDURE — 99214 PR OFFICE/OUTPT VISIT, EST, LEVL IV, 30-39 MIN: ICD-10-PCS | Mod: 95,,, | Performed by: NURSE PRACTITIONER

## 2021-09-22 PROCEDURE — 1160F PR REVIEW ALL MEDS BY PRESCRIBER/CLIN PHARMACIST DOCUMENTED: ICD-10-PCS | Mod: CPTII,95,, | Performed by: NURSE PRACTITIONER

## 2021-09-22 PROCEDURE — 1159F PR MEDICATION LIST DOCUMENTED IN MEDICAL RECORD: ICD-10-PCS | Mod: CPTII,95,, | Performed by: NURSE PRACTITIONER

## 2021-09-22 RX ORDER — PROMETHAZINE HYDROCHLORIDE 12.5 MG/1
12.5 TABLET ORAL EVERY 8 HOURS PRN
Qty: 30 TABLET | Refills: 1 | Status: SHIPPED | OUTPATIENT
Start: 2021-09-22 | End: 2022-07-27 | Stop reason: SDUPTHER

## 2021-09-22 RX ORDER — BLOOD-GLUCOSE SENSOR
3 EACH MISCELLANEOUS CONTINUOUS
Qty: 3 EACH | Refills: 11 | Status: SHIPPED | OUTPATIENT
Start: 2021-09-22 | End: 2021-10-08 | Stop reason: SDUPTHER

## 2021-09-22 RX ORDER — DULAGLUTIDE 3 MG/.5ML
3 INJECTION, SOLUTION SUBCUTANEOUS
Qty: 12 PEN | Refills: 3 | Status: SHIPPED | OUTPATIENT
Start: 2021-09-22 | End: 2021-11-17

## 2021-09-22 RX ORDER — DAPAGLIFLOZIN 10 MG/1
10 TABLET, FILM COATED ORAL DAILY
Qty: 90 TABLET | Refills: 1 | Status: SHIPPED | OUTPATIENT
Start: 2021-09-22 | End: 2022-05-09 | Stop reason: SDUPTHER

## 2021-09-22 RX ORDER — BLOOD-GLUCOSE SENSOR
3 EACH MISCELLANEOUS CONTINUOUS
Qty: 3 EACH | Refills: 11 | Status: CANCELLED | OUTPATIENT
Start: 2021-09-22 | End: 2022-09-22

## 2021-09-22 RX ORDER — DULAGLUTIDE 3 MG/.5ML
3 INJECTION, SOLUTION SUBCUTANEOUS
Qty: 4 PEN | Refills: 6 | Status: CANCELLED | OUTPATIENT
Start: 2021-09-22

## 2021-09-23 ENCOUNTER — HOSPITAL ENCOUNTER (OUTPATIENT)
Dept: PREADMISSION TESTING | Facility: HOSPITAL | Age: 61
Discharge: HOME | End: 2021-09-23
Attending: NEUROLOGICAL SURGERY
Payer: COMMERCIAL

## 2021-09-23 RX ORDER — LIDOCAINE HYDROCHLORIDE 10 MG/ML
1 INJECTION, SOLUTION EPIDURAL; INFILTRATION; INTRACAUDAL; PERINEURAL ONCE
Status: CANCELLED | OUTPATIENT
Start: 2021-09-23 | End: 2021-09-23

## 2021-09-23 RX ORDER — SODIUM CHLORIDE, SODIUM LACTATE, POTASSIUM CHLORIDE, CALCIUM CHLORIDE 600; 310; 30; 20 MG/100ML; MG/100ML; MG/100ML; MG/100ML
INJECTION, SOLUTION INTRAVENOUS CONTINUOUS
Status: CANCELLED | OUTPATIENT
Start: 2021-09-23

## 2021-09-25 ENCOUNTER — LAB VISIT (OUTPATIENT)
Dept: FAMILY MEDICINE | Facility: CLINIC | Age: 61
End: 2021-09-25
Payer: COMMERCIAL

## 2021-09-25 DIAGNOSIS — Z41.9 SURGERY, ELECTIVE: ICD-10-CM

## 2021-09-25 PROCEDURE — U0005 INFEC AGEN DETEC AMPLI PROBE: HCPCS | Performed by: NEUROLOGICAL SURGERY

## 2021-09-25 PROCEDURE — U0003 INFECTIOUS AGENT DETECTION BY NUCLEIC ACID (DNA OR RNA); SEVERE ACUTE RESPIRATORY SYNDROME CORONAVIRUS 2 (SARS-COV-2) (CORONAVIRUS DISEASE [COVID-19]), AMPLIFIED PROBE TECHNIQUE, MAKING USE OF HIGH THROUGHPUT TECHNOLOGIES AS DESCRIBED BY CMS-2020-01-R: HCPCS | Performed by: NEUROLOGICAL SURGERY

## 2021-09-26 LAB
SARS-COV-2 RNA RESP QL NAA+PROBE: NOT DETECTED
SARS-COV-2- CYCLE NUMBER: NORMAL

## 2021-09-27 ENCOUNTER — PATIENT MESSAGE (OUTPATIENT)
Dept: INTERNAL MEDICINE | Facility: CLINIC | Age: 61
End: 2021-09-27

## 2021-09-28 ENCOUNTER — SPECIALTY PHARMACY (OUTPATIENT)
Dept: PHARMACY | Facility: CLINIC | Age: 61
End: 2021-09-28

## 2021-09-28 ENCOUNTER — PATIENT MESSAGE (OUTPATIENT)
Dept: INTERNAL MEDICINE | Facility: CLINIC | Age: 61
End: 2021-09-28

## 2021-09-28 ENCOUNTER — HOSPITAL ENCOUNTER (OUTPATIENT)
Facility: HOSPITAL | Age: 61
Discharge: HOME | End: 2021-09-28
Attending: NEUROLOGICAL SURGERY | Admitting: NEUROLOGICAL SURGERY
Payer: COMMERCIAL

## 2021-09-28 ENCOUNTER — PATIENT MESSAGE (OUTPATIENT)
Dept: PHARMACY | Facility: CLINIC | Age: 61
End: 2021-09-28

## 2021-09-28 ENCOUNTER — ANESTHESIA (OUTPATIENT)
Dept: SURGERY | Facility: HOSPITAL | Age: 61
End: 2021-09-28
Payer: COMMERCIAL

## 2021-09-28 VITALS
TEMPERATURE: 98 F | OXYGEN SATURATION: 100 % | HEART RATE: 69 BPM | HEIGHT: 64 IN | BODY MASS INDEX: 27.66 KG/M2 | WEIGHT: 162 LBS | SYSTOLIC BLOOD PRESSURE: 125 MMHG | DIASTOLIC BLOOD PRESSURE: 71 MMHG | RESPIRATION RATE: 20 BRPM

## 2021-09-28 DIAGNOSIS — M53.3 SI (SACROILIAC) JOINT DYSFUNCTION: Primary | ICD-10-CM

## 2021-09-28 PROCEDURE — 37000008 HC ANESTHESIA 1ST 15 MINUTES: Performed by: NEUROLOGICAL SURGERY

## 2021-09-28 PROCEDURE — 71000016 HC POSTOP RECOV ADDL HR: Performed by: NEUROLOGICAL SURGERY

## 2021-09-28 PROCEDURE — 27096 INJECT SACROILIAC JOINT: CPT | Mod: RT,,, | Performed by: NEUROLOGICAL SURGERY

## 2021-09-28 PROCEDURE — 63600175 PHARM REV CODE 636 W HCPCS: Performed by: NURSE ANESTHETIST, CERTIFIED REGISTERED

## 2021-09-28 PROCEDURE — 25000003 PHARM REV CODE 250: Performed by: NEUROLOGICAL SURGERY

## 2021-09-28 PROCEDURE — 25500020 PHARM REV CODE 255: Performed by: NEUROLOGICAL SURGERY

## 2021-09-28 PROCEDURE — 71000015 HC POSTOP RECOV 1ST HR: Performed by: NEUROLOGICAL SURGERY

## 2021-09-28 PROCEDURE — 27096 PR INJECTION,SACROILIAC JOINT: ICD-10-PCS | Mod: RT,,, | Performed by: NEUROLOGICAL SURGERY

## 2021-09-28 PROCEDURE — 37000009 HC ANESTHESIA EA ADD 15 MINS: Performed by: NEUROLOGICAL SURGERY

## 2021-09-28 PROCEDURE — 25000003 PHARM REV CODE 250: Performed by: ANESTHESIOLOGY

## 2021-09-28 PROCEDURE — 36000705 HC OR TIME LEV I EA ADD 15 MIN: Performed by: NEUROLOGICAL SURGERY

## 2021-09-28 PROCEDURE — 36000704 HC OR TIME LEV I 1ST 15 MIN: Performed by: NEUROLOGICAL SURGERY

## 2021-09-28 PROCEDURE — 25000003 PHARM REV CODE 250: Performed by: NURSE ANESTHETIST, CERTIFIED REGISTERED

## 2021-09-28 RX ORDER — PROPOFOL 10 MG/ML
VIAL (ML) INTRAVENOUS CONTINUOUS PRN
Status: DISCONTINUED | OUTPATIENT
Start: 2021-09-28 | End: 2021-09-28

## 2021-09-28 RX ORDER — LIDOCAINE HYDROCHLORIDE 10 MG/ML
1 INJECTION, SOLUTION EPIDURAL; INFILTRATION; INTRACAUDAL; PERINEURAL ONCE
Status: DISCONTINUED | OUTPATIENT
Start: 2021-09-28 | End: 2021-09-28 | Stop reason: HOSPADM

## 2021-09-28 RX ORDER — ESZOPICLONE 2 MG/1
2 TABLET, FILM COATED ORAL NIGHTLY
Qty: 30 TABLET | Refills: 0 | Status: SHIPPED | OUTPATIENT
Start: 2021-09-28 | End: 2021-10-28 | Stop reason: SDUPTHER

## 2021-09-28 RX ORDER — SODIUM CHLORIDE, SODIUM LACTATE, POTASSIUM CHLORIDE, CALCIUM CHLORIDE 600; 310; 30; 20 MG/100ML; MG/100ML; MG/100ML; MG/100ML
INJECTION, SOLUTION INTRAVENOUS CONTINUOUS
Status: DISCONTINUED | OUTPATIENT
Start: 2021-09-28 | End: 2021-09-28 | Stop reason: HOSPADM

## 2021-09-28 RX ORDER — BUPIVACAINE HYDROCHLORIDE 2.5 MG/ML
INJECTION, SOLUTION INFILTRATION; PERINEURAL
Status: DISCONTINUED | OUTPATIENT
Start: 2021-09-28 | End: 2021-09-28 | Stop reason: HOSPADM

## 2021-09-28 RX ORDER — MUPIROCIN 20 MG/G
OINTMENT TOPICAL 2 TIMES DAILY
Status: CANCELLED | OUTPATIENT
Start: 2021-09-28 | End: 2021-10-03

## 2021-09-28 RX ORDER — METOPROLOL TARTRATE 25 MG/1
25 TABLET ORAL ONCE
Status: COMPLETED | OUTPATIENT
Start: 2021-09-28 | End: 2021-09-28

## 2021-09-28 RX ORDER — PROPOFOL 10 MG/ML
VIAL (ML) INTRAVENOUS
Status: DISCONTINUED | OUTPATIENT
Start: 2021-09-28 | End: 2021-09-28

## 2021-09-28 RX ORDER — SODIUM CHLORIDE 9 MG/ML
INJECTION, SOLUTION INTRAVENOUS CONTINUOUS
Status: DISCONTINUED | OUTPATIENT
Start: 2021-09-28 | End: 2021-09-28 | Stop reason: HOSPADM

## 2021-09-28 RX ORDER — SODIUM CHLORIDE, SODIUM LACTATE, POTASSIUM CHLORIDE, CALCIUM CHLORIDE 600; 310; 30; 20 MG/100ML; MG/100ML; MG/100ML; MG/100ML
INJECTION, SOLUTION INTRAVENOUS CONTINUOUS PRN
Status: DISCONTINUED | OUTPATIENT
Start: 2021-09-28 | End: 2021-09-28

## 2021-09-28 RX ORDER — LIDOCAINE HYDROCHLORIDE 10 MG/ML
INJECTION INFILTRATION; PERINEURAL
Status: DISCONTINUED | OUTPATIENT
Start: 2021-09-28 | End: 2021-09-28 | Stop reason: HOSPADM

## 2021-09-28 RX ORDER — LIDOCAINE HYDROCHLORIDE 20 MG/ML
INJECTION, SOLUTION EPIDURAL; INFILTRATION; INTRACAUDAL; PERINEURAL
Status: DISCONTINUED | OUTPATIENT
Start: 2021-09-28 | End: 2021-09-28

## 2021-09-28 RX ADMIN — PROPOFOL 30 MG: 10 INJECTION, EMULSION INTRAVENOUS at 07:09

## 2021-09-28 RX ADMIN — METOPROLOL TARTRATE 25 MG: 25 TABLET, FILM COATED ORAL at 06:09

## 2021-09-28 RX ADMIN — LIDOCAINE HYDROCHLORIDE 80 MG: 20 INJECTION, SOLUTION EPIDURAL; INFILTRATION; INTRACAUDAL; PERINEURAL at 07:09

## 2021-09-28 RX ADMIN — SODIUM CHLORIDE, SODIUM LACTATE, POTASSIUM CHLORIDE, AND CALCIUM CHLORIDE: .6; .31; .03; .02 INJECTION, SOLUTION INTRAVENOUS at 07:09

## 2021-09-28 RX ADMIN — SODIUM CHLORIDE: 0.9 INJECTION, SOLUTION INTRAVENOUS at 06:09

## 2021-09-28 RX ADMIN — PROPOFOL 100 MCG/KG/MIN: 10 INJECTION, EMULSION INTRAVENOUS at 07:09

## 2021-09-29 ENCOUNTER — TELEPHONE (OUTPATIENT)
Dept: NEUROSURGERY | Facility: CLINIC | Age: 61
End: 2021-09-29

## 2021-09-30 PROCEDURE — 99457 RPM TX MGMT 1ST 20 MIN: CPT | Mod: S$GLB,,, | Performed by: INTERNAL MEDICINE

## 2021-09-30 PROCEDURE — 99457 PR MONITORING, PHYSIOL PARAM, REMOTE, 1ST 20 MINS, PER MONTH: ICD-10-PCS | Mod: S$GLB,,, | Performed by: INTERNAL MEDICINE

## 2021-10-08 ENCOUNTER — PATIENT MESSAGE (OUTPATIENT)
Dept: INTERNAL MEDICINE | Facility: CLINIC | Age: 61
End: 2021-10-08

## 2021-10-08 ENCOUNTER — TELEPHONE (OUTPATIENT)
Dept: INTERNAL MEDICINE | Facility: CLINIC | Age: 61
End: 2021-10-08

## 2021-10-08 DIAGNOSIS — I12.9 TYPE 2 DIABETES MELLITUS WITH STAGE 2 CHRONIC KIDNEY DISEASE AND HYPERTENSION: ICD-10-CM

## 2021-10-08 DIAGNOSIS — N18.2 TYPE 2 DIABETES MELLITUS WITH STAGE 2 CHRONIC KIDNEY DISEASE AND HYPERTENSION: ICD-10-CM

## 2021-10-08 DIAGNOSIS — E11.22 TYPE 2 DIABETES MELLITUS WITH STAGE 2 CHRONIC KIDNEY DISEASE AND HYPERTENSION: ICD-10-CM

## 2021-10-08 RX ORDER — BLOOD-GLUCOSE SENSOR
3 EACH MISCELLANEOUS CONTINUOUS
Qty: 3 EACH | Refills: 11 | Status: SHIPPED | OUTPATIENT
Start: 2021-10-08 | End: 2021-10-10 | Stop reason: SDUPTHER

## 2021-10-10 DIAGNOSIS — E11.22 TYPE 2 DIABETES MELLITUS WITH STAGE 2 CHRONIC KIDNEY DISEASE AND HYPERTENSION: Primary | ICD-10-CM

## 2021-10-10 DIAGNOSIS — I12.9 TYPE 2 DIABETES MELLITUS WITH STAGE 2 CHRONIC KIDNEY DISEASE AND HYPERTENSION: Primary | ICD-10-CM

## 2021-10-10 DIAGNOSIS — N18.2 TYPE 2 DIABETES MELLITUS WITH STAGE 2 CHRONIC KIDNEY DISEASE AND HYPERTENSION: Primary | ICD-10-CM

## 2021-10-10 RX ORDER — BLOOD-GLUCOSE SENSOR
3 EACH MISCELLANEOUS CONTINUOUS
Qty: 3 EACH | Refills: 11 | Status: SHIPPED | OUTPATIENT
Start: 2021-10-10 | End: 2021-10-14 | Stop reason: SDUPTHER

## 2021-10-11 ENCOUNTER — PATIENT MESSAGE (OUTPATIENT)
Dept: INTERNAL MEDICINE | Facility: CLINIC | Age: 61
End: 2021-10-11

## 2021-10-14 ENCOUNTER — PATIENT MESSAGE (OUTPATIENT)
Dept: INTERNAL MEDICINE | Facility: CLINIC | Age: 61
End: 2021-10-14
Payer: COMMERCIAL

## 2021-10-14 ENCOUNTER — PATIENT MESSAGE (OUTPATIENT)
Dept: INTERNAL MEDICINE | Facility: CLINIC | Age: 61
End: 2021-10-14

## 2021-10-14 DIAGNOSIS — N18.2 TYPE 2 DIABETES MELLITUS WITH STAGE 2 CHRONIC KIDNEY DISEASE AND HYPERTENSION: ICD-10-CM

## 2021-10-14 DIAGNOSIS — I12.9 TYPE 2 DIABETES MELLITUS WITH STAGE 2 CHRONIC KIDNEY DISEASE AND HYPERTENSION: ICD-10-CM

## 2021-10-14 DIAGNOSIS — E11.22 TYPE 2 DIABETES MELLITUS WITH STAGE 2 CHRONIC KIDNEY DISEASE AND HYPERTENSION: ICD-10-CM

## 2021-10-14 RX ORDER — BLOOD-GLUCOSE SENSOR
3 EACH MISCELLANEOUS CONTINUOUS
Qty: 3 EACH | Refills: 11 | Status: SHIPPED | OUTPATIENT
Start: 2021-10-14 | End: 2022-08-10

## 2021-10-15 ENCOUNTER — PATIENT MESSAGE (OUTPATIENT)
Dept: ADMINISTRATIVE | Facility: OTHER | Age: 61
End: 2021-10-15

## 2021-10-15 ENCOUNTER — PATIENT OUTREACH (OUTPATIENT)
Dept: ADMINISTRATIVE | Facility: OTHER | Age: 61
End: 2021-10-15

## 2021-10-15 DIAGNOSIS — Z12.31 ENCOUNTER FOR SCREENING MAMMOGRAM FOR MALIGNANT NEOPLASM OF BREAST: Primary | ICD-10-CM

## 2021-10-18 ENCOUNTER — OFFICE VISIT (OUTPATIENT)
Dept: NEUROSURGERY | Facility: CLINIC | Age: 61
End: 2021-10-18
Payer: COMMERCIAL

## 2021-10-18 ENCOUNTER — TELEPHONE (OUTPATIENT)
Dept: NEUROSURGERY | Facility: CLINIC | Age: 61
End: 2021-10-18

## 2021-10-18 DIAGNOSIS — Z98.1 S/P LUMBAR FUSION: Primary | ICD-10-CM

## 2021-10-18 DIAGNOSIS — M53.3 SACROILIAC JOINT DYSFUNCTION OF RIGHT SIDE: ICD-10-CM

## 2021-10-18 PROCEDURE — 3066F NEPHROPATHY DOC TX: CPT | Mod: CPTII,95,, | Performed by: NEUROLOGICAL SURGERY

## 2021-10-18 PROCEDURE — 3051F HG A1C>EQUAL 7.0%<8.0%: CPT | Mod: CPTII,95,, | Performed by: NEUROLOGICAL SURGERY

## 2021-10-18 PROCEDURE — 3066F PR DOCUMENTATION OF TREATMENT FOR NEPHROPATHY: ICD-10-PCS | Mod: CPTII,95,, | Performed by: NEUROLOGICAL SURGERY

## 2021-10-18 PROCEDURE — 4010F ACE/ARB THERAPY RXD/TAKEN: CPT | Mod: CPTII,95,, | Performed by: NEUROLOGICAL SURGERY

## 2021-10-18 PROCEDURE — 4010F PR ACE/ARB THEARPY RXD/TAKEN: ICD-10-PCS | Mod: CPTII,95,, | Performed by: NEUROLOGICAL SURGERY

## 2021-10-18 PROCEDURE — 3060F PR POS MICROALBUMINURIA RESULT DOCUMENTED/REVIEW: ICD-10-PCS | Mod: CPTII,95,, | Performed by: NEUROLOGICAL SURGERY

## 2021-10-18 PROCEDURE — 99213 PR OFFICE/OUTPT VISIT, EST, LEVL III, 20-29 MIN: ICD-10-PCS | Mod: 95,,, | Performed by: NEUROLOGICAL SURGERY

## 2021-10-18 PROCEDURE — 3051F PR MOST RECENT HEMOGLOBIN A1C LEVEL 7.0 - < 8.0%: ICD-10-PCS | Mod: CPTII,95,, | Performed by: NEUROLOGICAL SURGERY

## 2021-10-18 PROCEDURE — 99213 OFFICE O/P EST LOW 20 MIN: CPT | Mod: 95,,, | Performed by: NEUROLOGICAL SURGERY

## 2021-10-18 PROCEDURE — 3060F POS MICROALBUMINURIA REV: CPT | Mod: CPTII,95,, | Performed by: NEUROLOGICAL SURGERY

## 2021-10-21 ENCOUNTER — SPECIALTY PHARMACY (OUTPATIENT)
Dept: PHARMACY | Facility: CLINIC | Age: 61
End: 2021-10-21

## 2021-10-25 ENCOUNTER — OFFICE VISIT (OUTPATIENT)
Dept: UROLOGY | Facility: CLINIC | Age: 61
End: 2021-10-25
Payer: COMMERCIAL

## 2021-10-25 VITALS
SYSTOLIC BLOOD PRESSURE: 141 MMHG | DIASTOLIC BLOOD PRESSURE: 74 MMHG | WEIGHT: 156.06 LBS | HEART RATE: 71 BPM | HEIGHT: 64 IN | BODY MASS INDEX: 26.64 KG/M2

## 2021-10-25 DIAGNOSIS — Z98.890 S/P URETERAL REIMPLANTATION: Primary | ICD-10-CM

## 2021-10-25 DIAGNOSIS — N18.2 TYPE 2 DIABETES MELLITUS WITH STAGE 2 CHRONIC KIDNEY DISEASE AND HYPERTENSION: Chronic | ICD-10-CM

## 2021-10-25 DIAGNOSIS — I12.9 TYPE 2 DIABETES MELLITUS WITH STAGE 2 CHRONIC KIDNEY DISEASE AND HYPERTENSION: Chronic | ICD-10-CM

## 2021-10-25 DIAGNOSIS — E11.22 TYPE 2 DIABETES MELLITUS WITH STAGE 2 CHRONIC KIDNEY DISEASE AND HYPERTENSION: Chronic | ICD-10-CM

## 2021-10-25 PROBLEM — N99.81 INTRAOPERATIVE URETERAL INJURY: Status: RESOLVED | Noted: 2019-12-11 | Resolved: 2021-10-25

## 2021-10-25 PROCEDURE — 3051F HG A1C>EQUAL 7.0%<8.0%: CPT | Mod: CPTII,S$GLB,, | Performed by: UROLOGY

## 2021-10-25 PROCEDURE — 3078F PR MOST RECENT DIASTOLIC BLOOD PRESSURE < 80 MM HG: ICD-10-PCS | Mod: CPTII,S$GLB,, | Performed by: UROLOGY

## 2021-10-25 PROCEDURE — 99999 PR PBB SHADOW E&M-EST. PATIENT-LVL IV: ICD-10-PCS | Mod: PBBFAC,,, | Performed by: UROLOGY

## 2021-10-25 PROCEDURE — 3051F PR MOST RECENT HEMOGLOBIN A1C LEVEL 7.0 - < 8.0%: ICD-10-PCS | Mod: CPTII,S$GLB,, | Performed by: UROLOGY

## 2021-10-25 PROCEDURE — 4010F ACE/ARB THERAPY RXD/TAKEN: CPT | Mod: CPTII,S$GLB,, | Performed by: UROLOGY

## 2021-10-25 PROCEDURE — 3060F POS MICROALBUMINURIA REV: CPT | Mod: CPTII,S$GLB,, | Performed by: UROLOGY

## 2021-10-25 PROCEDURE — 99999 PR PBB SHADOW E&M-EST. PATIENT-LVL IV: CPT | Mod: PBBFAC,,, | Performed by: UROLOGY

## 2021-10-25 PROCEDURE — 3066F NEPHROPATHY DOC TX: CPT | Mod: CPTII,S$GLB,, | Performed by: UROLOGY

## 2021-10-25 PROCEDURE — 3066F PR DOCUMENTATION OF TREATMENT FOR NEPHROPATHY: ICD-10-PCS | Mod: CPTII,S$GLB,, | Performed by: UROLOGY

## 2021-10-25 PROCEDURE — 3008F BODY MASS INDEX DOCD: CPT | Mod: CPTII,S$GLB,, | Performed by: UROLOGY

## 2021-10-25 PROCEDURE — 3078F DIAST BP <80 MM HG: CPT | Mod: CPTII,S$GLB,, | Performed by: UROLOGY

## 2021-10-25 PROCEDURE — 1159F MED LIST DOCD IN RCRD: CPT | Mod: CPTII,S$GLB,, | Performed by: UROLOGY

## 2021-10-25 PROCEDURE — 3060F PR POS MICROALBUMINURIA RESULT DOCUMENTED/REVIEW: ICD-10-PCS | Mod: CPTII,S$GLB,, | Performed by: UROLOGY

## 2021-10-25 PROCEDURE — 1159F PR MEDICATION LIST DOCUMENTED IN MEDICAL RECORD: ICD-10-PCS | Mod: CPTII,S$GLB,, | Performed by: UROLOGY

## 2021-10-25 PROCEDURE — 3077F PR MOST RECENT SYSTOLIC BLOOD PRESSURE >= 140 MM HG: ICD-10-PCS | Mod: CPTII,S$GLB,, | Performed by: UROLOGY

## 2021-10-25 PROCEDURE — 4010F PR ACE/ARB THEARPY RXD/TAKEN: ICD-10-PCS | Mod: CPTII,S$GLB,, | Performed by: UROLOGY

## 2021-10-25 PROCEDURE — 99214 OFFICE O/P EST MOD 30 MIN: CPT | Mod: S$GLB,,, | Performed by: UROLOGY

## 2021-10-25 PROCEDURE — 3008F PR BODY MASS INDEX (BMI) DOCUMENTED: ICD-10-PCS | Mod: CPTII,S$GLB,, | Performed by: UROLOGY

## 2021-10-25 PROCEDURE — 99214 PR OFFICE/OUTPT VISIT, EST, LEVL IV, 30-39 MIN: ICD-10-PCS | Mod: S$GLB,,, | Performed by: UROLOGY

## 2021-10-25 PROCEDURE — 3077F SYST BP >= 140 MM HG: CPT | Mod: CPTII,S$GLB,, | Performed by: UROLOGY

## 2021-10-27 ENCOUNTER — TELEPHONE (OUTPATIENT)
Dept: NEUROSURGERY | Facility: CLINIC | Age: 61
End: 2021-10-27
Payer: COMMERCIAL

## 2021-10-27 DIAGNOSIS — M53.3 SACROILIAC JOINT DYSFUNCTION: ICD-10-CM

## 2021-10-27 DIAGNOSIS — Q74.2 FUSION CONGENITAL, SACROILIAC JOINT: Primary | ICD-10-CM

## 2021-10-28 RX ORDER — ESZOPICLONE 2 MG/1
2 TABLET, FILM COATED ORAL NIGHTLY
Qty: 30 TABLET | Refills: 0 | Status: SHIPPED | OUTPATIENT
Start: 2021-10-28 | End: 2021-12-01 | Stop reason: SDUPTHER

## 2021-10-29 ENCOUNTER — PATIENT MESSAGE (OUTPATIENT)
Dept: INTERNAL MEDICINE | Facility: CLINIC | Age: 61
End: 2021-10-29
Payer: COMMERCIAL

## 2021-11-03 ENCOUNTER — HOSPITAL ENCOUNTER (OUTPATIENT)
Dept: RADIOLOGY | Facility: HOSPITAL | Age: 61
Discharge: HOME | End: 2021-11-03
Attending: INTERNAL MEDICINE
Payer: COMMERCIAL

## 2021-11-03 VITALS — BODY MASS INDEX: 26.98 KG/M2 | WEIGHT: 158 LBS | HEIGHT: 64 IN

## 2021-11-03 DIAGNOSIS — Z12.31 ENCOUNTER FOR SCREENING MAMMOGRAM FOR MALIGNANT NEOPLASM OF BREAST: ICD-10-CM

## 2021-11-03 PROCEDURE — 77063 BREAST TOMOSYNTHESIS BI: CPT | Mod: 26,,, | Performed by: RADIOLOGY

## 2021-11-03 PROCEDURE — 77067 SCR MAMMO BI INCL CAD: CPT | Mod: TC

## 2021-11-03 PROCEDURE — 77063 MAMMO DIGITAL SCREENING BILAT WITH TOMO: ICD-10-PCS | Mod: 26,,, | Performed by: RADIOLOGY

## 2021-11-03 PROCEDURE — 77067 MAMMO DIGITAL SCREENING BILAT WITH TOMO: ICD-10-PCS | Mod: 26,,, | Performed by: RADIOLOGY

## 2021-11-03 PROCEDURE — 77067 SCR MAMMO BI INCL CAD: CPT | Mod: 26,,, | Performed by: RADIOLOGY

## 2021-11-17 ENCOUNTER — OFFICE VISIT (OUTPATIENT)
Dept: INTERNAL MEDICINE | Facility: CLINIC | Age: 61
End: 2021-11-17
Payer: COMMERCIAL

## 2021-11-17 ENCOUNTER — LAB VISIT (OUTPATIENT)
Dept: LAB | Facility: HOSPITAL | Age: 61
End: 2021-11-17
Attending: INTERNAL MEDICINE
Payer: COMMERCIAL

## 2021-11-17 ENCOUNTER — TELEPHONE (OUTPATIENT)
Dept: INTERNAL MEDICINE | Facility: CLINIC | Age: 61
End: 2021-11-17

## 2021-11-17 VITALS
BODY MASS INDEX: 26.16 KG/M2 | WEIGHT: 153.25 LBS | SYSTOLIC BLOOD PRESSURE: 140 MMHG | HEART RATE: 76 BPM | HEIGHT: 64 IN | RESPIRATION RATE: 18 BRPM | DIASTOLIC BLOOD PRESSURE: 82 MMHG | OXYGEN SATURATION: 99 % | TEMPERATURE: 98 F

## 2021-11-17 DIAGNOSIS — I15.2 HYPERTENSION ASSOCIATED WITH DIABETES: Chronic | ICD-10-CM

## 2021-11-17 DIAGNOSIS — E78.5 HYPERLIPIDEMIA ASSOCIATED WITH TYPE 2 DIABETES MELLITUS: Chronic | ICD-10-CM

## 2021-11-17 DIAGNOSIS — N18.2 TYPE 2 DIABETES MELLITUS WITH STAGE 2 CHRONIC KIDNEY DISEASE AND HYPERTENSION: Chronic | ICD-10-CM

## 2021-11-17 DIAGNOSIS — E11.69 HYPERLIPIDEMIA ASSOCIATED WITH TYPE 2 DIABETES MELLITUS: Chronic | ICD-10-CM

## 2021-11-17 DIAGNOSIS — I25.10 CORONARY ARTERY DISEASE INVOLVING NATIVE CORONARY ARTERY OF NATIVE HEART WITHOUT ANGINA PECTORIS: Chronic | ICD-10-CM

## 2021-11-17 DIAGNOSIS — I12.9 TYPE 2 DIABETES MELLITUS WITH STAGE 2 CHRONIC KIDNEY DISEASE AND HYPERTENSION: Chronic | ICD-10-CM

## 2021-11-17 DIAGNOSIS — E11.59 HYPERTENSION ASSOCIATED WITH DIABETES: Chronic | ICD-10-CM

## 2021-11-17 DIAGNOSIS — E11.51 TYPE 2 DIABETES MELLITUS WITH DIABETIC PERIPHERAL ANGIOPATHY WITHOUT GANGRENE, WITHOUT LONG-TERM CURRENT USE OF INSULIN: Primary | Chronic | ICD-10-CM

## 2021-11-17 DIAGNOSIS — E11.22 TYPE 2 DIABETES MELLITUS WITH STAGE 2 CHRONIC KIDNEY DISEASE AND HYPERTENSION: Chronic | ICD-10-CM

## 2021-11-17 DIAGNOSIS — E11.51 TYPE 2 DIABETES MELLITUS WITH DIABETIC PERIPHERAL ANGIOPATHY WITHOUT GANGRENE, WITHOUT LONG-TERM CURRENT USE OF INSULIN: Chronic | ICD-10-CM

## 2021-11-17 LAB
ALBUMIN SERPL BCP-MCNC: 4 G/DL (ref 3.5–5.2)
ALP SERPL-CCNC: 116 U/L (ref 55–135)
ALT SERPL W/O P-5'-P-CCNC: 13 U/L (ref 10–44)
ANION GAP SERPL CALC-SCNC: 12 MMOL/L (ref 8–16)
AST SERPL-CCNC: 17 U/L (ref 10–40)
BILIRUB SERPL-MCNC: 1.1 MG/DL (ref 0.1–1)
BUN SERPL-MCNC: 14 MG/DL (ref 6–20)
CALCIUM SERPL-MCNC: 10 MG/DL (ref 8.7–10.5)
CHLORIDE SERPL-SCNC: 105 MMOL/L (ref 95–110)
CO2 SERPL-SCNC: 26 MMOL/L (ref 23–29)
CREAT SERPL-MCNC: 1 MG/DL (ref 0.5–1.4)
EST. GFR  (AFRICAN AMERICAN): >60 ML/MIN/1.73 M^2
EST. GFR  (NON AFRICAN AMERICAN): >60 ML/MIN/1.73 M^2
ESTIMATED AVG GLUCOSE: 180 MG/DL (ref 68–131)
GLUCOSE SERPL-MCNC: 157 MG/DL (ref 70–110)
HBA1C MFR BLD: 7.9 % (ref 4–5.6)
POTASSIUM SERPL-SCNC: 3.8 MMOL/L (ref 3.5–5.1)
PROT SERPL-MCNC: 8 G/DL (ref 6–8.4)
SODIUM SERPL-SCNC: 143 MMOL/L (ref 136–145)

## 2021-11-17 PROCEDURE — 99999 PR PBB SHADOW E&M-EST. PATIENT-LVL V: CPT | Mod: PBBFAC,,, | Performed by: INTERNAL MEDICINE

## 2021-11-17 PROCEDURE — 99214 OFFICE O/P EST MOD 30 MIN: CPT | Mod: 25,S$GLB,, | Performed by: INTERNAL MEDICINE

## 2021-11-17 PROCEDURE — 1159F MED LIST DOCD IN RCRD: CPT | Mod: CPTII,S$GLB,, | Performed by: INTERNAL MEDICINE

## 2021-11-17 PROCEDURE — 3051F PR MOST RECENT HEMOGLOBIN A1C LEVEL 7.0 - < 8.0%: ICD-10-PCS | Mod: CPTII,S$GLB,, | Performed by: INTERNAL MEDICINE

## 2021-11-17 PROCEDURE — 1160F RVW MEDS BY RX/DR IN RCRD: CPT | Mod: CPTII,S$GLB,, | Performed by: INTERNAL MEDICINE

## 2021-11-17 PROCEDURE — 1159F PR MEDICATION LIST DOCUMENTED IN MEDICAL RECORD: ICD-10-PCS | Mod: CPTII,S$GLB,, | Performed by: INTERNAL MEDICINE

## 2021-11-17 PROCEDURE — 4010F ACE/ARB THERAPY RXD/TAKEN: CPT | Mod: CPTII,S$GLB,, | Performed by: INTERNAL MEDICINE

## 2021-11-17 PROCEDURE — 3051F HG A1C>EQUAL 7.0%<8.0%: CPT | Mod: CPTII,S$GLB,, | Performed by: INTERNAL MEDICINE

## 2021-11-17 PROCEDURE — 3077F SYST BP >= 140 MM HG: CPT | Mod: CPTII,S$GLB,, | Performed by: INTERNAL MEDICINE

## 2021-11-17 PROCEDURE — 1160F PR REVIEW ALL MEDS BY PRESCRIBER/CLIN PHARMACIST DOCUMENTED: ICD-10-PCS | Mod: CPTII,S$GLB,, | Performed by: INTERNAL MEDICINE

## 2021-11-17 PROCEDURE — 3077F PR MOST RECENT SYSTOLIC BLOOD PRESSURE >= 140 MM HG: ICD-10-PCS | Mod: CPTII,S$GLB,, | Performed by: INTERNAL MEDICINE

## 2021-11-17 PROCEDURE — 83036 HEMOGLOBIN GLYCOSYLATED A1C: CPT | Performed by: INTERNAL MEDICINE

## 2021-11-17 PROCEDURE — 99999 PR PBB SHADOW E&M-EST. PATIENT-LVL V: ICD-10-PCS | Mod: PBBFAC,,, | Performed by: INTERNAL MEDICINE

## 2021-11-17 PROCEDURE — 3008F BODY MASS INDEX DOCD: CPT | Mod: CPTII,S$GLB,, | Performed by: INTERNAL MEDICINE

## 2021-11-17 PROCEDURE — 99214 PR OFFICE/OUTPT VISIT, EST, LEVL IV, 30-39 MIN: ICD-10-PCS | Mod: 25,S$GLB,, | Performed by: INTERNAL MEDICINE

## 2021-11-17 PROCEDURE — 4010F PR ACE/ARB THEARPY RXD/TAKEN: ICD-10-PCS | Mod: CPTII,S$GLB,, | Performed by: INTERNAL MEDICINE

## 2021-11-17 PROCEDURE — 90686 FLU VACCINE (QUAD) GREATER THAN OR EQUAL TO 3YO PRESERVATIVE FREE IM: ICD-10-PCS | Mod: S$GLB,,, | Performed by: INTERNAL MEDICINE

## 2021-11-17 PROCEDURE — 3079F PR MOST RECENT DIASTOLIC BLOOD PRESSURE 80-89 MM HG: ICD-10-PCS | Mod: CPTII,S$GLB,, | Performed by: INTERNAL MEDICINE

## 2021-11-17 PROCEDURE — 3008F PR BODY MASS INDEX (BMI) DOCUMENTED: ICD-10-PCS | Mod: CPTII,S$GLB,, | Performed by: INTERNAL MEDICINE

## 2021-11-17 PROCEDURE — 36415 COLL VENOUS BLD VENIPUNCTURE: CPT | Mod: PO | Performed by: INTERNAL MEDICINE

## 2021-11-17 PROCEDURE — 3060F POS MICROALBUMINURIA REV: CPT | Mod: CPTII,S$GLB,, | Performed by: INTERNAL MEDICINE

## 2021-11-17 PROCEDURE — 3066F NEPHROPATHY DOC TX: CPT | Mod: CPTII,S$GLB,, | Performed by: INTERNAL MEDICINE

## 2021-11-17 PROCEDURE — 3066F PR DOCUMENTATION OF TREATMENT FOR NEPHROPATHY: ICD-10-PCS | Mod: CPTII,S$GLB,, | Performed by: INTERNAL MEDICINE

## 2021-11-17 PROCEDURE — 3060F PR POS MICROALBUMINURIA RESULT DOCUMENTED/REVIEW: ICD-10-PCS | Mod: CPTII,S$GLB,, | Performed by: INTERNAL MEDICINE

## 2021-11-17 PROCEDURE — 3079F DIAST BP 80-89 MM HG: CPT | Mod: CPTII,S$GLB,, | Performed by: INTERNAL MEDICINE

## 2021-11-17 PROCEDURE — 90471 FLU VACCINE (QUAD) GREATER THAN OR EQUAL TO 3YO PRESERVATIVE FREE IM: ICD-10-PCS | Mod: S$GLB,,, | Performed by: INTERNAL MEDICINE

## 2021-11-17 PROCEDURE — 80053 COMPREHEN METABOLIC PANEL: CPT | Performed by: INTERNAL MEDICINE

## 2021-11-17 PROCEDURE — 90471 IMMUNIZATION ADMIN: CPT | Mod: S$GLB,,, | Performed by: INTERNAL MEDICINE

## 2021-11-17 PROCEDURE — 90686 IIV4 VACC NO PRSV 0.5 ML IM: CPT | Mod: S$GLB,,, | Performed by: INTERNAL MEDICINE

## 2021-11-17 RX ORDER — HYDROCHLOROTHIAZIDE 12.5 MG/1
12.5 TABLET ORAL DAILY
Qty: 90 TABLET | Refills: 3 | Status: SHIPPED | OUTPATIENT
Start: 2021-11-17 | End: 2022-07-20 | Stop reason: SDUPTHER

## 2021-11-20 ENCOUNTER — IMMUNIZATION (OUTPATIENT)
Dept: INTERNAL MEDICINE | Facility: CLINIC | Age: 61
End: 2021-11-20
Payer: COMMERCIAL

## 2021-11-20 DIAGNOSIS — Z23 NEED FOR VACCINATION: Primary | ICD-10-CM

## 2021-11-20 PROCEDURE — 0004A COVID-19, MRNA, LNP-S, PF, 30 MCG/0.3 ML DOSE VACCINE: CPT | Mod: CV19,PBBFAC

## 2021-11-22 ENCOUNTER — SPECIALTY PHARMACY (OUTPATIENT)
Dept: PHARMACY | Facility: CLINIC | Age: 61
End: 2021-11-22
Payer: COMMERCIAL

## 2021-11-22 ENCOUNTER — TELEPHONE (OUTPATIENT)
Dept: INTERNAL MEDICINE | Facility: CLINIC | Age: 61
End: 2021-11-22
Payer: COMMERCIAL

## 2021-12-01 RX ORDER — ESZOPICLONE 2 MG/1
2 TABLET, FILM COATED ORAL NIGHTLY
Qty: 30 TABLET | Refills: 0 | Status: SHIPPED | OUTPATIENT
Start: 2021-12-01 | End: 2021-12-27 | Stop reason: SDUPTHER

## 2021-12-27 ENCOUNTER — PATIENT MESSAGE (OUTPATIENT)
Dept: INTERNAL MEDICINE | Facility: CLINIC | Age: 61
End: 2021-12-27
Payer: COMMERCIAL

## 2021-12-27 RX ORDER — EVOLOCUMAB 140 MG/ML
140 INJECTION, SOLUTION SUBCUTANEOUS
Qty: 2 ML | Refills: 6 | Status: SHIPPED | OUTPATIENT
Start: 2021-12-27 | End: 2022-04-11 | Stop reason: SDUPTHER

## 2021-12-28 RX ORDER — ESZOPICLONE 2 MG/1
2 TABLET, FILM COATED ORAL NIGHTLY
Qty: 30 TABLET | Refills: 0 | Status: SHIPPED | OUTPATIENT
Start: 2021-12-28 | End: 2022-02-04 | Stop reason: SDUPTHER

## 2021-12-29 ENCOUNTER — SPECIALTY PHARMACY (OUTPATIENT)
Dept: PHARMACY | Facility: CLINIC | Age: 61
End: 2021-12-29
Payer: COMMERCIAL

## 2021-12-29 ENCOUNTER — PATIENT MESSAGE (OUTPATIENT)
Dept: PHARMACY | Facility: CLINIC | Age: 61
End: 2021-12-29
Payer: COMMERCIAL

## 2021-12-31 PROCEDURE — 99457 PR MONITORING, PHYSIOL PARAM, REMOTE, 1ST 20 MINS, PER MONTH: ICD-10-PCS | Mod: S$GLB,,, | Performed by: INTERNAL MEDICINE

## 2021-12-31 PROCEDURE — 99457 RPM TX MGMT 1ST 20 MIN: CPT | Mod: S$GLB,,, | Performed by: INTERNAL MEDICINE

## 2022-01-04 ENCOUNTER — TELEPHONE (OUTPATIENT)
Dept: NEUROSURGERY | Facility: CLINIC | Age: 62
End: 2022-01-04
Payer: COMMERCIAL

## 2022-01-04 DIAGNOSIS — Z41.9 SURGERY, ELECTIVE: Primary | ICD-10-CM

## 2022-01-11 ENCOUNTER — PATIENT MESSAGE (OUTPATIENT)
Dept: SURGERY | Facility: HOSPITAL | Age: 62
End: 2022-01-11
Payer: COMMERCIAL

## 2022-01-12 ENCOUNTER — TELEPHONE (OUTPATIENT)
Dept: PREADMISSION TESTING | Facility: HOSPITAL | Age: 62
End: 2022-01-12
Payer: COMMERCIAL

## 2022-01-12 NOTE — TELEPHONE ENCOUNTER
I don't know what this means.  Please call the patient and find out further information.    Thanks,  Renetta Castellanos [facility], PA-C  Neurosurgery  [facility]  01/11/2022

## 2022-01-12 NOTE — TELEPHONE ENCOUNTER
----- Message from Jenn Hoffman MA sent at 1/12/2022 10:04 AM CST -----  She's canceling her surgery. Thanks  ----- Message -----  From: Cynthia Santiago LPN  Sent: 1/10/2022   9:38 AM CST  To: [facility] Staff    Patient is scheduled for surgery on 1/25/22 and needs clearance for her PCP. Thanks Dorota

## 2022-01-25 ENCOUNTER — SPECIALTY PHARMACY (OUTPATIENT)
Dept: PHARMACY | Facility: CLINIC | Age: 62
End: 2022-01-25
Payer: COMMERCIAL

## 2022-02-05 RX ORDER — ESZOPICLONE 2 MG/1
2 TABLET, FILM COATED ORAL NIGHTLY
Qty: 30 TABLET | Refills: 0 | Status: SHIPPED | OUTPATIENT
Start: 2022-02-05 | End: 2022-03-11 | Stop reason: SDUPTHER

## 2022-02-14 ENCOUNTER — PATIENT MESSAGE (OUTPATIENT)
Dept: OTHER | Facility: OTHER | Age: 62
End: 2022-02-14
Payer: COMMERCIAL

## 2022-02-21 ENCOUNTER — SPECIALTY PHARMACY (OUTPATIENT)
Dept: PHARMACY | Facility: CLINIC | Age: 62
End: 2022-02-21
Payer: COMMERCIAL

## 2022-02-21 NOTE — TELEPHONE ENCOUNTER
Specialty Pharmacy - Refill Coordination    Specialty Medication Orders Linked to Encounter    Flowsheet Row Most Recent Value   Medication #1 evolocumab (REPATHA SURECLICK) 140 mg/mL PnIj (Order#[id number], Rx#[id number])          Refill Questions - Documented Responses    Flowsheet Row Most Recent Value   Patient Availability and HIPAA Verification    Does patient want to proceed with activity? Yes   HIPAA/medical authority confirmed? Yes   Relationship to patient of person spoken to? Self   Refill Screening Questions    Would patient like to speak to a pharmacist? No   When does the patient need to receive the medication? 02/28/22   Refill Delivery Questions    How will the patient receive the medication? Delivery Toshia   When does the patient need to receive the medication? 02/28/22   Shipping Address Prescription   Address in [facility] confirmed and updated if neccessary? Yes   Expected Copay ($) 5   Payment Method CC on file   Days supply of Refill 28   Refill activity completed? Yes   Refill activity plan Refill scheduled   Shipment/Pickup Date: 02/25/22          Current Outpatient Medications   Medication Sig    ACCU-CHEK EDIN PLUS METER Misc     albuterol (PROVENTIL/VENTOLIN HFA) 90 mcg/actuation inhaler Inhale 2 puffs into the lungs every 6 (six) hours as needed for Wheezing.    albuterol-ipratropium (DUO-NEB) 2.5 mg-0.5 mg/3 mL nebulizer solution Inhale 3 mLs into the lungs.    amLODIPine (NORVASC) 10 MG tablet TAKE 1 TABLET BY MOUTH EVERY DAY    aspirin 81 mg Tab Take 81 mg by mouth. 1 Tablet Oral Every day    atorvastatin (LIPITOR) 40 MG tablet Take 1 tablet (40 mg total) by mouth every evening.    blood sugar diagnostic (ACCU-CHEK EDIN PLUS TEST STRP) Strp TEST BLOOD SUGARS 4 TIMES DAILY    blood-glucose meter,continuous (DEXCOM G6 ) Misc 1 each by Misc.(Non-Drug; Combo Route) route continuous    blood-glucose sensor (DEXCOM G6 SENSOR) Leann Change sensor every 10 days     "blood-glucose transmitter (DEXCOM G6 TRANSMITTER) Leann Use as directed    clopidogreL (PLAVIX) 75 mg tablet TAKE 1 TABLET BY MOUTH EVERY DAY    dapagliflozin (FARXIGA) 10 mg tablet Take 1 tablet (10 mg total) by mouth once daily.    dulaglutide (TRULICITY) 4.5 mg/0.5 mL pen injector Inject 4.5 mg into the skin every 7 days.    EScitalopram oxalate (LEXAPRO) 10 MG tablet TAKE 1 TABLET BY MOUTH EVERY DAY    eszopiclone (LUNESTA) 2 MG Tab Take 1 tablet (2 mg total) by mouth every evening.    evolocumab (REPATHA SURECLICK) 140 mg/mL PnIj Inject 1 mL (140 mg total) into the skin every 14 (fourteen) days.    hydroCHLOROthiazide (HYDRODIURIL) 12.5 MG Tab Take 1 tablet (12.5 mg total) by mouth once daily.    isosorbide mononitrate (ISMO,MONOKET) 10 mg tablet TAKE 1 TABLET BY MOUTH EVERY DAY    metoprolol tartrate (LOPRESSOR) 50 MG tablet Take 1 tablet (50 mg total) by mouth 2 (two) times daily.    multivitamin (THERAGRAN) per tablet Take 1 tablet by mouth once daily.    pen needle, diabetic (NOVOTWIST) 32 gauge x 1/5" Ndle Use 1 needle to inject insulin four times daily    potassium chloride SA (K-DUR,KLOR-CON) 20 MEQ tablet Take 1 tablet (20 mEq total) by mouth 2 (two) times daily.    promethazine (PHENERGAN) 12.5 MG Tab Take 1 tablet (12.5 mg total) by mouth every 8 (eight) hours as needed (nausea).    telmisartan (MICARDIS) 80 MG Tab Take 1 tablet (80 mg total) by mouth once daily. Stop losartan    varicella-zoster gE-AS01B, PF, (SHINGRIX) 50 mcg/0.5 mL injection Inject into the muscle.   Last reviewed on 12/21/2021  3:20 PM by Cee Nowak, PharmD    Review of patient's allergies indicates:   Allergen Reactions    Milk containing products      Causes GI distress    Last reviewed on  12/21/2021 3:20 PM by Cee Nowak      Tasks added this encounter   3/21/2022 - Refill Call (Auto Added)   Tasks due within next 3 months   No tasks due.     [facility]e [facility] - Specialty Pharmacy  [address]  [address]  [facility] [address]  Phone: [phone]  Fax: [phone]

## 2022-02-22 ENCOUNTER — OFFICE VISIT (OUTPATIENT)
Dept: INTERNAL MEDICINE | Facility: CLINIC | Age: 62
End: 2022-02-22
Payer: COMMERCIAL

## 2022-02-22 VITALS
HEART RATE: 76 BPM | HEIGHT: 64 IN | TEMPERATURE: 98 F | DIASTOLIC BLOOD PRESSURE: 64 MMHG | WEIGHT: 153.69 LBS | RESPIRATION RATE: 18 BRPM | BODY MASS INDEX: 26.24 KG/M2 | SYSTOLIC BLOOD PRESSURE: 112 MMHG | OXYGEN SATURATION: 98 %

## 2022-02-22 DIAGNOSIS — I12.9 TYPE 2 DIABETES MELLITUS WITH STAGE 2 CHRONIC KIDNEY DISEASE AND HYPERTENSION: ICD-10-CM

## 2022-02-22 DIAGNOSIS — S91.302A WOUND OF LEFT FOOT: Primary | ICD-10-CM

## 2022-02-22 DIAGNOSIS — E11.22 TYPE 2 DIABETES MELLITUS WITH STAGE 2 CHRONIC KIDNEY DISEASE AND HYPERTENSION: ICD-10-CM

## 2022-02-22 DIAGNOSIS — N18.2 TYPE 2 DIABETES MELLITUS WITH STAGE 2 CHRONIC KIDNEY DISEASE AND HYPERTENSION: ICD-10-CM

## 2022-02-22 PROCEDURE — 3078F DIAST BP <80 MM HG: CPT | Mod: CPTII,S$GLB,, | Performed by: HOSPITALIST

## 2022-02-22 PROCEDURE — 3072F PR LOW RISK FOR RETINOPATHY: ICD-10-PCS | Mod: CPTII,S$GLB,, | Performed by: HOSPITALIST

## 2022-02-22 PROCEDURE — 99999 PR PBB SHADOW E&M-EST. PATIENT-LVL V: ICD-10-PCS | Mod: PBBFAC,,, | Performed by: HOSPITALIST

## 2022-02-22 PROCEDURE — 4010F PR ACE/ARB THEARPY RXD/TAKEN: ICD-10-PCS | Mod: CPTII,S$GLB,, | Performed by: HOSPITALIST

## 2022-02-22 PROCEDURE — 3052F HG A1C>EQUAL 8.0%<EQUAL 9.0%: CPT | Mod: CPTII,S$GLB,, | Performed by: HOSPITALIST

## 2022-02-22 PROCEDURE — 99999 PR PBB SHADOW E&M-EST. PATIENT-LVL V: CPT | Mod: PBBFAC,,, | Performed by: HOSPITALIST

## 2022-02-22 PROCEDURE — 3052F PR MOST RECENT HEMOGLOBIN A1C LEVEL 8.0 - < 9.0%: ICD-10-PCS | Mod: CPTII,S$GLB,, | Performed by: HOSPITALIST

## 2022-02-22 PROCEDURE — 3051F HG A1C>EQUAL 7.0%<8.0%: CPT | Mod: CPTII,S$GLB,, | Performed by: HOSPITALIST

## 2022-02-22 PROCEDURE — 3072F LOW RISK FOR RETINOPATHY: CPT | Mod: CPTII,S$GLB,, | Performed by: HOSPITALIST

## 2022-02-22 PROCEDURE — 99213 PR OFFICE/OUTPT VISIT, EST, LEVL III, 20-29 MIN: ICD-10-PCS | Mod: S$GLB,,, | Performed by: HOSPITALIST

## 2022-02-22 PROCEDURE — 3074F PR MOST RECENT SYSTOLIC BLOOD PRESSURE < 130 MM HG: ICD-10-PCS | Mod: CPTII,S$GLB,, | Performed by: HOSPITALIST

## 2022-02-22 PROCEDURE — 3074F SYST BP LT 130 MM HG: CPT | Mod: CPTII,S$GLB,, | Performed by: HOSPITALIST

## 2022-02-22 PROCEDURE — 3078F PR MOST RECENT DIASTOLIC BLOOD PRESSURE < 80 MM HG: ICD-10-PCS | Mod: CPTII,S$GLB,, | Performed by: HOSPITALIST

## 2022-02-22 PROCEDURE — 3008F PR BODY MASS INDEX (BMI) DOCUMENTED: ICD-10-PCS | Mod: CPTII,S$GLB,, | Performed by: HOSPITALIST

## 2022-02-22 PROCEDURE — 99213 OFFICE O/P EST LOW 20 MIN: CPT | Mod: S$GLB,,, | Performed by: HOSPITALIST

## 2022-02-22 PROCEDURE — 3051F PR MOST RECENT HEMOGLOBIN A1C LEVEL 7.0 - < 8.0%: ICD-10-PCS | Mod: CPTII,S$GLB,, | Performed by: HOSPITALIST

## 2022-02-22 PROCEDURE — 3008F BODY MASS INDEX DOCD: CPT | Mod: CPTII,S$GLB,, | Performed by: HOSPITALIST

## 2022-02-22 PROCEDURE — 4010F ACE/ARB THERAPY RXD/TAKEN: CPT | Mod: CPTII,S$GLB,, | Performed by: HOSPITALIST

## 2022-02-22 NOTE — PROGRESS NOTES
"Chief Complaint  No chief complaint on file.      HPI  Mariann Huff is a 61 y.o. female with HTN, HLD, RNFN9TP, CAD s/p sent 03'09' presents for wound on her left heel.   Pt noticed wound ~1 week ago, reports wound looked like "split skin" with serosanguinous exudate. Pt treated wound with neosporin and reports that wound closed after 2/3 days. Pt reports pain over the area with ambulation and pressure. Reports pain has improved over the past week. Pt w/ no hx of foot ulcers. She denies trauma to the area, claudication, lower extremity edema, paresthesias, numbness.      PAST MEDICAL HISTORY:  Past Medical History:   Diagnosis Date    Anticoagulant long-term use     Asthma     Back pain     Bradycardia, unspecified 5/8/2019    The etiology of the bradycardic episode is unclear.  The have appear to be respiratory in origin (at least the 1st episode).  We will continue to monitor carefully.  We are awaiting evaluation by Cardiology.     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, with long-term current use of insulin 1/9/2020       PAST SURGICAL HISTORY:  Past Surgical History:   Procedure Laterality Date    ANTEGRADE NEPHROSTOGRAPHY Left 12/11/2019    Procedure: Nephrostogram - antegrade;  Surgeon: Robin Boyd MD;  Location: [facility] OR [address] [place] [address];  Service: Urology;  Laterality: Left;    BRONCHOSCOPY N/A 5/2/2019    Procedure: BRONCHOSCOPY; "  Surgeon: Sean Ruano MD;  [address]  Service: General;  Laterality: N/A;    CARDIAC CATHETERIZATION      CATARACT EXTRACTION      cataract extraction left eye      cataracts      CAUDAL EPIDURAL STEROID INJECTION N/A 2019    Procedure: Injection-steroid-epidural-caudal;  Surgeon: Dave Bentley Jr., MD;  Location: [facility] PAIN MGT;  Service: Pain Management;  Laterality: N/A;     SECTION, LOW TRANSVERSE      COLONOSCOPY N/A 2019    Procedure: COLONOSCOPY;  Surgeon: Al Alaniz MD;  [facility]  Service: Endoscopy;  Laterality: N/A;    CORONARY ANGIOPLASTY      CYSTOGRAM N/A 2019    Procedure: CYSTOGRAM INTRAOP;  Surgeon: Robin Boyd MD;  [address];  Service: Urology;  Laterality: N/A;  1 HOUR    CYSTOSCOPY W/ RETROGRADES Left 2019    Procedure: CYSTOSCOPY, WITH RETROGRADE PYELOGRAM;  Surgeon: Robin Boyd MD;  [address];  Service: Urology;  Laterality: Left;  fluro    ESOPHAGOGASTRODUODENOSCOPY W/ PEG  2019    Procedure: EGD, WITH PEG TUBE INSERTION;  Surgeon: Sean Ruano MD;  Location: [address];  Service: General;;    EXCISION TURBINATE, SUBMUCOUS      FLEXIBLE SIGMOIDOSCOPY N/A 2019    Procedure: SIGMOIDOSCOPY, FLEXIBLE;  Surgeon: ALBERTO Amin MD;  [facility]  Service: Endoscopy;  Laterality: N/A;    FLEXIBLE SIGMOIDOSCOPY N/A 2019    Procedure: SIGMOIDOSCOPY, FLEXIBLE;  Surgeon: ALBERTO Amin MD;  Location: [facility] ENDO ([address] [place] [address]);  Service: Endoscopy;  Laterality: N/A;    FUSION OF LUMBAR SPINE BY ANTERIOR APPROACH Left 2019    Procedure: FUSION, SPINE, LUMBAR, ANTERIOR APPROACH Left L5-S1 Anterior to Psoa Interbody Fusion, L5-S1 Posterior Instrumentation;  Surgeon: Mk George MD;  Location: [facility] OR [address] [place] [address];  Service: Neurosurgery;  Laterality: Left;  Porcedure:  Left L5-S1 Anterior to Psoa Interbody Fusion, L5-S1 Posterior  Instrumentation  Surgery Time: 4 Hrs  LOS: 2-3  Anesthesia: General   Blood: Type & Screen  R    HAND SURGERY Left     HAND SURGERY Right     torn ligament repair/ Dr. Yeboah    HYSTERECTOMY      INJECTION OF STEROID Right 12/10/2020    Procedure: INJECTION, STEROID Right SI Joint Block and Steroid Injection;  Surgeon: Mk George MD;  Location: [facility];  Service: Neurosurgery;  Laterality: Right;  Procedure: Right SI Joint Block and Steroid Injection   SUrgery Time: 30 Min  LOS: 0  Anesthesia: MAC  Radiology: C-arm  Bed: Tomer 4 Poster  Position: Prone    INJECTION OF STEROID Right 9/28/2021    Procedure: INJECTION, STEROID Right SI joint block & steroid injection;  Surgeon: Mk George MD;  [facility];  Service: Neurosurgery;  Laterality: Right;  Procedure: Right SI joint block & steroid injection  Surgery Time: 30m  Anesthesia: Local MAC  Radiology: C-arm  Bed: Regular  Position: Prone    left foot surgery      left wrist surgery      LYSIS OF ADHESIONS N/A 2/19/2020    Procedure: LYSIS, ADHESIONS;  Surgeon: Robin Boyd MD;  Location: [address];  Service: Urology;  Laterality: N/A;    NASAL SEPTUM SURGERY  5/7/15    PERCUTANEOUS NEPHROSTOMY Left 4/21/2019    Procedure: Creation, Nephrostomy, Percutaneous;  Surgeon: Karina Surgeon;  Location: [facility];  Service: Anesthesiology;  Laterality: Left;    REPAIR OF URETER  4/12/2019    Procedure: REPAIR, URETER;  Surgeon: Mk George MD;  Location: [address];  Service: Neurosurgery;;    REPLACEMENT OF NEPHROSTOMY TUBE N/A 7/18/2019    Procedure: REPLACEMENT, NEPHROSTOMY TUBE;  Surgeon: [facility] Diagnostic Provider;  [address];  Service: Anesthesiology;  Laterality: N/A;  188    REPLACEMENT OF NEPHROSTOMY TUBE N/A 7/24/2019    Procedure: REPLACEMENT, NEPHROSTOMY TUBE;  Surgeon: [facility] Diagnostic Provider;  [facility];  Service: Anesthesiology;  Laterality: N/A;  188    REPLACEMENT OF  NEPHROSTOMY TUBE N/A 10/7/2019    Procedure: REPLACEMENT, NEPHROSTOMY TUBE;  Surgeon: [facility] Diagnostic Provider;  Location: [facility] OR 2ND FLR;  Service: Anesthesiology;  Laterality: N/A;  189    REPLACEMENT OF NEPHROSTOMY TUBE N/A 11/25/2019    Procedure: REPLACEMENT, NEPHROSTOMY TUBE;  Surgeon: Leo Diagnostic Provider;  Location: [facility] OR [facility];  Service: Anesthesiology;  Laterality: N/A;  Room 188/Bessy    REPLACEMENT OF NEPHROSTOMY TUBE Right 2/19/2020    Procedure: REPLACEMENT, NEPHROSTOMY TUBE removal removal;  Surgeon: Robin Boyd MD;  [facility];  Service: Urology;  Laterality: Right;    rt elbow surgery      S/P LAD COATED STENT  05/14/2010    6 total     S/P OM1 STENT  08/2003    SINUS SURGERY      F.E.S.S.    TRACHEOSTOMY N/A 5/2/2019    Procedure: CREATION, TRACHEOSTOMY;  Surgeon: Sean Ruano MD;  [facility];  Service: General;  Laterality: N/A;    TUBAL LIGATION      URETERAL REIMPLANTION Left 2/19/2020    Procedure: REIMPLANTATION, URETER WITH PSOAS HITCH;  Surgeon: Robin Boyd MD;  [facility];  Service: Urology;  Laterality: Left;       SOCIAL HISTORY:  Social History     Socioeconomic History    Marital status:      Spouse name: Shamir    Number of children: 2   Occupational History    Occupation: cafeteria     Employer: [employer]     Employer: [employer]     Employer: [employer]   Tobacco Use    Smoking status: Never Smoker    Smokeless tobacco: Never Used   Substance and Sexual Activity    Alcohol use: No     Alcohol/week: 0.0 standard drinks    Drug use: No    Sexual activity: Yes     Partners: Male     Birth control/protection: Post-menopausal     Comment:    Other Topics Concern    Are you pregnant or think you may be? No    Breast-feeding No   Social History Narrative    . 2 children.      Social Determinants of Health     Physical Activity: Unknown    Days of  Exercise per Week: 1 day   Stress: Stress Concern Present    Feeling of Stress : Rather much   Social Connections: Unknown    Frequency of Communication with Friends and Family: Three times a week    Frequency of Social Gatherings with Friends and Family: Once a week    Active Member of Clubs or Organizations: No    Attends Club or Organization Meetings: Patient refused    Marital Status:        FAMILY HISTORY:  Family History   Problem Relation Age of Onset    Diabetes Mother     Heart disease Mother     Diabetes Father     Leukemia Father         leukemia    Heart attack Father     Diabetes Sister     Diabetes Brother     Diabetes Sister     No Known Problems Sister     No Known Problems Brother     No Known Problems Brother     No Known Problems Maternal Grandmother     No Known Problems Maternal Grandfather     No Known Problems Paternal Grandmother     No Known Problems Paternal Grandfather     No Known Problems Son     No Known Problems Son     No Known Problems Maternal Aunt     No Known Problems Maternal Uncle     No Known Problems Paternal Aunt     No Known Problems Paternal Uncle     Colon cancer Neg Hx     Inflammatory bowel disease Neg Hx     Melanoma Neg Hx     Psoriasis Neg Hx     Lupus Neg Hx     Eczema Neg Hx     Acne Neg Hx     Amblyopia Neg Hx     Blindness Neg Hx     Cancer Neg Hx     Cataracts Neg Hx     Glaucoma Neg Hx     Hypertension Neg Hx     Macular degeneration Neg Hx     Retinal detachment Neg Hx     Strabismus Neg Hx     Stroke Neg Hx     Thyroid disease Neg Hx     Heart failure Neg Hx     Hyperlipidemia Neg Hx        ALLERGIES AND MEDICATIONS: updated and reviewed.  Review of patient's allergies indicates:   Allergen Reactions    Milk containing products      Causes GI distress     Current Outpatient Medications   Medication Sig Dispense Refill    ACCU-CHEK EDIN PLUS METER Misc       albuterol (PROVENTIL/VENTOLIN HFA) 90  "mcg/actuation inhaler Inhale 2 puffs into the lungs every 6 (six) hours as needed for Wheezing. 1 g 3    albuterol-ipratropium (DUO-NEB) 2.5 mg-0.5 mg/3 mL nebulizer solution Inhale 3 mLs into the lungs.      amLODIPine (NORVASC) 10 MG tablet TAKE 1 TABLET BY MOUTH EVERY DAY 90 tablet 2    aspirin 81 mg Tab Take 81 mg by mouth. 1 Tablet Oral Every day      atorvastatin (LIPITOR) 40 MG tablet Take 1 tablet (40 mg total) by mouth every evening. 90 tablet 3    blood sugar diagnostic (ACCU-CHEK EDIN PLUS TEST STRP) Strp TEST BLOOD SUGARS 4 TIMES DAILY 150 strip 11    blood-glucose meter,continuous (DEXCOM G6 ) Misc 1 each by Misc.(Non-Drug; Combo Route) route continuous 1 each PRN    blood-glucose sensor (DEXCOM G6 SENSOR) Leann Change sensor every 10 days 3 each 11    blood-glucose transmitter (DEXCOM G6 TRANSMITTER) Leann Use as directed 1 each 3    clopidogreL (PLAVIX) 75 mg tablet TAKE 1 TABLET BY MOUTH EVERY DAY 90 tablet 2    dapagliflozin (FARXIGA) 10 mg tablet Take 1 tablet (10 mg total) by mouth once daily. 90 tablet 1    dulaglutide (TRULICITY) 4.5 mg/0.5 mL pen injector Inject 4.5 mg into the skin every 7 days. 12 pen 3    EScitalopram oxalate (LEXAPRO) 10 MG tablet TAKE 1 TABLET BY MOUTH EVERY DAY 90 tablet 2    eszopiclone (LUNESTA) 2 MG Tab Take 1 tablet (2 mg total) by mouth every evening. 30 tablet 0    evolocumab (REPATHA SURECLICK) 140 mg/mL PnIj Inject 1 mL (140 mg total) into the skin every 14 (fourteen) days. 2 mL 6    hydroCHLOROthiazide (HYDRODIURIL) 12.5 MG Tab Take 1 tablet (12.5 mg total) by mouth once daily. 90 tablet 3    isosorbide mononitrate (ISMO,MONOKET) 10 mg tablet TAKE 1 TABLET BY MOUTH EVERY DAY 90 tablet 2    metoprolol tartrate (LOPRESSOR) 50 MG tablet Take 1 tablet (50 mg total) by mouth 2 (two) times daily. 60 tablet 5    multivitamin (THERAGRAN) per tablet Take 1 tablet by mouth once daily.      pen needle, diabetic (NOVOTWIST) 32 gauge x 1/5" Ndle Use " 1 needle to inject insulin four times daily 400 each 2    potassium chloride SA (K-DUR,KLOR-CON) 20 MEQ tablet Take 1 tablet (20 mEq total) by mouth 2 (two) times daily. 60 tablet 11    promethazine (PHENERGAN) 12.5 MG Tab Take 1 tablet (12.5 mg total) by mouth every 8 (eight) hours as needed (nausea). 30 tablet 1    telmisartan (MICARDIS) 80 MG Tab Take 1 tablet (80 mg total) by mouth once daily. Stop losartan 90 tablet 1    varicella-zoster gE-AS01B, PF, (SHINGRIX) 50 mcg/0.5 mL injection Inject into the muscle. 1 each 0     Current Facility-Administered Medications   Medication Dose Route Frequency Provider Last Rate Last Admin    fluorescein 500 mg/5 mL (10 %) injection 500 mg  5 mL Intravenous Once D. Pancho Doshi MD         Facility-Administered Medications Ordered in Other Visits   Medication Dose Route Frequency Provider Last Rate Last Admin    lidocaine (PF) 10 mg/ml (1%) injection 10 mg  1 mL Intradermal Once Dave Bentley Jr., MD             ROS  Review of Systems   Constitutional: Negative for activity change, appetite change, fatigue, fever and unexpected weight change.   HENT: Negative for congestion, sinus pressure, sinus pain, sore throat and trouble swallowing.    Eyes: Negative for photophobia, redness and visual disturbance.   Respiratory: Negative for cough, chest tightness, shortness of breath and wheezing.    Cardiovascular: Negative for chest pain, palpitations and leg swelling.   Gastrointestinal: Negative for abdominal pain, blood in stool, diarrhea, nausea and vomiting.   Endocrine: Negative for cold intolerance, heat intolerance, polydipsia and polyuria.   Genitourinary: Negative for decreased urine volume, difficulty urinating, dysuria, frequency and urgency.   Musculoskeletal: Positive for arthralgias. Negative for back pain, myalgias and neck pain.   Skin: Positive for color change and wound. Negative for pallor and rash.   Allergic/Immunologic: Negative for environmental  allergies, food allergies and immunocompromised state.   Neurological: Negative for dizziness, syncope, weakness, light-headedness, numbness and headaches.   Hematological: Negative for adenopathy.   Psychiatric/Behavioral: Negative for behavioral problems, dysphoric mood and suicidal ideas.           Physical Exam  BP: 112/64 HR 76 T 97.5 O2 98%  BMI 26.38          Physical Exam  Constitutional:       General: She is not in acute distress.     Appearance: Normal appearance. She is not ill-appearing or toxic-appearing.   HENT:      Head: Normocephalic and atraumatic.      Right Ear: Tympanic membrane normal.      Left Ear: Tympanic membrane normal.      Nose: No congestion or rhinorrhea.      Mouth/Throat:      Mouth: Mucous membranes are moist.      Pharynx: No oropharyngeal exudate or posterior oropharyngeal erythema.   Eyes:      General: No scleral icterus.        Right eye: No discharge.         Left eye: No discharge.      Pupils: Pupils are equal, round, and reactive to light.   Neck:      Vascular: No carotid bruit.   Cardiovascular:      Rate and Rhythm: Normal rate and regular rhythm.      Pulses:           Dorsalis pedis pulses are 2+ on the right side and 1+ on the left side.        Posterior tibial pulses are 2+ on the right side and 1+ on the left side.      Heart sounds: Normal heart sounds. No murmur heard.    No friction rub. No gallop.   Pulmonary:      Effort: Pulmonary effort is normal. No respiratory distress.      Breath sounds: Normal breath sounds. No wheezing or rhonchi.   Abdominal:      General: Abdomen is flat. Bowel sounds are normal. There is no distension.      Palpations: Abdomen is soft. There is no mass.      Tenderness: There is no abdominal tenderness. There is no guarding or rebound.   Musculoskeletal:         General: No swelling or tenderness. Normal range of motion.      Cervical back: Normal range of motion.      Right lower leg: No edema.      Left lower leg: No edema.       Right foot: Normal range of motion. No deformity, Charcot foot, foot drop or prominent metatarsal heads.      Left foot: Normal range of motion. No Charcot foot or foot drop.        Feet:    Feet:      Right foot:      Skin integrity: No ulcer, blister, skin breakdown, erythema, warmth, callus, dry skin or fissure.      Toenail Condition: Right toenails are normal.      Left foot:      Skin integrity: Skin breakdown, callus, dry skin and fissure present. No ulcer, blister, erythema or warmth.      Toenail Condition: Left toenails are normal.      Comments: Hypopigmentation surrounding wound with scattered areas of hyperpigmentation   Lymphadenopathy:      Cervical: No cervical adenopathy.   Skin:     General: Skin is warm and dry.      Capillary Refill: Capillary refill takes less than 2 seconds.      Coloration: Skin is not jaundiced or pale.      Findings: No bruising or rash.   Neurological:      General: No focal deficit present.      Mental Status: She is alert and oriented to person, place, and time. Mental status is at baseline.      Cranial Nerves: No cranial nerve deficit.      Sensory: No sensory deficit.      Motor: No weakness.      Gait: Gait normal.   Psychiatric:         Mood and Affect: Mood normal.         Behavior: Behavior normal.         Thought Content: Thought content normal.           Health Maintenance       Date Due Completion Date    Shingles Vaccine (1 of 2) Never done ---    Eye Exam 02/25/2022 2/25/2021    Foot Exam 04/20/2022 4/20/2021    Override on 8/31/2016: Done    Hemoglobin A1c 05/17/2022 11/17/2021    Diabetes Urine Screening 09/16/2022 9/16/2021    Lipid Panel 09/16/2022 9/16/2021    Mammogram 11/03/2022 11/3/2021    High Dose Statin 11/17/2022 11/17/2021    Aspirin/Antiplatelet Therapy 11/17/2022 11/17/2021    Pneumococcal Vaccines (Age 0-64) (2 of 2 - PPSV23) 03/17/2026 12/6/2019    TETANUS VACCINE 04/17/2027 4/17/2017    Colorectal Cancer Screening 05/24/2029 6/6/2019             Assessment and Plan:    Left heel wound, closed, healing  -1 wk hx wound L heel, wound closure after tx w/ neosporin   -Fissure present with scab, dry skin and callus   -No erythema, exudate, fever o/e  -areas of hypo/hyperpigmentation surrounding wound, cannot r/o underlying infectious complication    PLAN:    -Ambulatory referral to podiatry  -CBC, ESR, CRP ordered   -X-Ray L foot ordered   -Pt encouraged to keep wound clean, educated on importance of wearing well fitted shoes and avoidance of barefoot walking

## 2022-02-23 ENCOUNTER — TELEPHONE (OUTPATIENT)
Dept: PODIATRY | Facility: CLINIC | Age: 62
End: 2022-02-23
Payer: COMMERCIAL

## 2022-02-24 ENCOUNTER — OFFICE VISIT (OUTPATIENT)
Dept: OPTOMETRY | Facility: CLINIC | Age: 62
End: 2022-02-24
Payer: COMMERCIAL

## 2022-02-24 DIAGNOSIS — E11.3393 MODERATE NONPROLIFERATIVE DIABETIC RETINOPATHY OF BOTH EYES WITHOUT MACULAR EDEMA ASSOCIATED WITH TYPE 2 DIABETES MELLITUS: Primary | ICD-10-CM

## 2022-02-24 DIAGNOSIS — H52.7 REFRACTIVE ERROR: ICD-10-CM

## 2022-02-24 DIAGNOSIS — Z96.1 PSEUDOPHAKIA OF BOTH EYES: ICD-10-CM

## 2022-02-24 DIAGNOSIS — H35.033 HYPERTENSIVE RETINOPATHY, BILATERAL: ICD-10-CM

## 2022-02-24 DIAGNOSIS — H04.123 DRY EYE SYNDROME OF BOTH EYES: ICD-10-CM

## 2022-02-24 PROCEDURE — 92015 DETERMINE REFRACTIVE STATE: CPT | Mod: S$GLB,,, | Performed by: OPTOMETRIST

## 2022-02-24 PROCEDURE — 99999 PR PBB SHADOW E&M-EST. PATIENT-LVL IV: CPT | Mod: PBBFAC,,, | Performed by: OPTOMETRIST

## 2022-02-24 PROCEDURE — 99999 PR PBB SHADOW E&M-EST. PATIENT-LVL IV: ICD-10-PCS | Mod: PBBFAC,,, | Performed by: OPTOMETRIST

## 2022-02-24 PROCEDURE — 1159F MED LIST DOCD IN RCRD: CPT | Mod: CPTII,S$GLB,, | Performed by: OPTOMETRIST

## 2022-02-24 PROCEDURE — 92015 PR REFRACTION: ICD-10-PCS | Mod: S$GLB,,, | Performed by: OPTOMETRIST

## 2022-02-24 PROCEDURE — 92014 PR EYE EXAM, EST PATIENT,COMPREHESV: ICD-10-PCS | Mod: S$GLB,,, | Performed by: OPTOMETRIST

## 2022-02-24 PROCEDURE — 92014 COMPRE OPH EXAM EST PT 1/>: CPT | Mod: S$GLB,,, | Performed by: OPTOMETRIST

## 2022-02-24 PROCEDURE — 1159F PR MEDICATION LIST DOCUMENTED IN MEDICAL RECORD: ICD-10-PCS | Mod: CPTII,S$GLB,, | Performed by: OPTOMETRIST

## 2022-02-24 NOTE — PROGRESS NOTES
HPI     CC: Pt is here today for a diabetic eye exam. She states that she has   noticed a decrease in her vision at both ranges    LARA: 2021 with Dr. Doshi     (+) Changes in vision   (-) Pain  (-) Irritation   (+) Itching   (-) Flashes  (-) Floaters  (+) Glasses wearer  (-) CL wearer  (+) Uses eye gtts, OTC Tears prn OU    Does patient want a refraction today?     (-) Eye injury  (+) Eye surgery, Cataract OU  (-)POHx  (+)FOHx, Glaucoma    (+)DM  Hemoglobin A1C       Date                     Value               Ref Range             Status                11/17/2021               7.9 (H)             4.0 - 5.6 %           Final                  09/16/2021               7.6 (H)             4.0 - 5.6 %           Final                   06/22/2021               8.9 (H)             4.0 - 5.6 %           Final                       Last edited by Buffy Dimas, OD on 2/24/2022  2:24 PM. (History)            Assessment /Plan     For exam results, see Encounter Report.    Moderate nonproliferative diabetic retinopathy of both eyes without macular edema associated with type 2 diabetes mellitus    Hypertensive retinopathy, bilateral    Pseudophakia of both eyes    Dry eye syndrome of both eyes    Refractive error      1. Educated pt on findings. Lost to f/u with Dr. Doshi. Stable NPDR OU. Will refer back to Dr. Doshi for f/u. Monitor.     2. Vessel changes, OU. Discussed importance of BP control, taking medications as directed, and following-up with primary care. If changes in vision noted, RTC. Monitor yearly unless changes noted sooner.     3. PCIOL clear and centered OU. Monitor yearly.     4. Educated pt on findings. Recommend ATs BID-TID for added lubrication and comfort. Monitor.     5. Updated SRx. Mild change from habitual. Monitor yearly.       RTC with Dr. Doshi as directed, me prn.

## 2022-03-02 ENCOUNTER — OFFICE VISIT (OUTPATIENT)
Dept: PODIATRY | Facility: CLINIC | Age: 62
End: 2022-03-02
Payer: COMMERCIAL

## 2022-03-02 VITALS
HEART RATE: 90 BPM | SYSTOLIC BLOOD PRESSURE: 122 MMHG | BODY MASS INDEX: 26.38 KG/M2 | DIASTOLIC BLOOD PRESSURE: 66 MMHG | WEIGHT: 153.69 LBS

## 2022-03-02 DIAGNOSIS — E11.22 TYPE 2 DIABETES MELLITUS WITH STAGE 2 CHRONIC KIDNEY DISEASE AND HYPERTENSION: Primary | ICD-10-CM

## 2022-03-02 DIAGNOSIS — L84 CORN OR CALLUS: ICD-10-CM

## 2022-03-02 DIAGNOSIS — I12.9 TYPE 2 DIABETES MELLITUS WITH STAGE 2 CHRONIC KIDNEY DISEASE AND HYPERTENSION: Primary | ICD-10-CM

## 2022-03-02 DIAGNOSIS — N18.2 TYPE 2 DIABETES MELLITUS WITH STAGE 2 CHRONIC KIDNEY DISEASE AND HYPERTENSION: Primary | ICD-10-CM

## 2022-03-02 PROCEDURE — 3074F PR MOST RECENT SYSTOLIC BLOOD PRESSURE < 130 MM HG: ICD-10-PCS | Mod: CPTII,S$GLB,, | Performed by: PODIATRIST

## 2022-03-02 PROCEDURE — 3072F LOW RISK FOR RETINOPATHY: CPT | Mod: CPTII,S$GLB,, | Performed by: PODIATRIST

## 2022-03-02 PROCEDURE — 99204 PR OFFICE/OUTPT VISIT, NEW, LEVL IV, 45-59 MIN: ICD-10-PCS | Mod: 25,S$GLB,, | Performed by: PODIATRIST

## 2022-03-02 PROCEDURE — 3078F DIAST BP <80 MM HG: CPT | Mod: CPTII,S$GLB,, | Performed by: PODIATRIST

## 2022-03-02 PROCEDURE — 3008F BODY MASS INDEX DOCD: CPT | Mod: CPTII,S$GLB,, | Performed by: PODIATRIST

## 2022-03-02 PROCEDURE — 1159F PR MEDICATION LIST DOCUMENTED IN MEDICAL RECORD: ICD-10-PCS | Mod: CPTII,S$GLB,, | Performed by: PODIATRIST

## 2022-03-02 PROCEDURE — 3072F PR LOW RISK FOR RETINOPATHY: ICD-10-PCS | Mod: CPTII,S$GLB,, | Performed by: PODIATRIST

## 2022-03-02 PROCEDURE — 1159F MED LIST DOCD IN RCRD: CPT | Mod: CPTII,S$GLB,, | Performed by: PODIATRIST

## 2022-03-02 PROCEDURE — 3051F HG A1C>EQUAL 7.0%<8.0%: CPT | Mod: CPTII,S$GLB,, | Performed by: PODIATRIST

## 2022-03-02 PROCEDURE — 3078F PR MOST RECENT DIASTOLIC BLOOD PRESSURE < 80 MM HG: ICD-10-PCS | Mod: CPTII,S$GLB,, | Performed by: PODIATRIST

## 2022-03-02 PROCEDURE — 3008F PR BODY MASS INDEX (BMI) DOCUMENTED: ICD-10-PCS | Mod: CPTII,S$GLB,, | Performed by: PODIATRIST

## 2022-03-02 PROCEDURE — 11055 PARING/CUTG B9 HYPRKER LES 1: CPT | Mod: S$GLB,,, | Performed by: PODIATRIST

## 2022-03-02 PROCEDURE — 11055 PR TRIM HYPERKERATOTIC SKIN LESION, ONE: ICD-10-PCS | Mod: S$GLB,,, | Performed by: PODIATRIST

## 2022-03-02 PROCEDURE — 1160F PR REVIEW ALL MEDS BY PRESCRIBER/CLIN PHARMACIST DOCUMENTED: ICD-10-PCS | Mod: CPTII,S$GLB,, | Performed by: PODIATRIST

## 2022-03-02 PROCEDURE — 99999 PR PBB SHADOW E&M-EST. PATIENT-LVL III: CPT | Mod: PBBFAC,,, | Performed by: PODIATRIST

## 2022-03-02 PROCEDURE — 3074F SYST BP LT 130 MM HG: CPT | Mod: CPTII,S$GLB,, | Performed by: PODIATRIST

## 2022-03-02 PROCEDURE — 99204 OFFICE O/P NEW MOD 45 MIN: CPT | Mod: 25,S$GLB,, | Performed by: PODIATRIST

## 2022-03-02 PROCEDURE — 99999 PR PBB SHADOW E&M-EST. PATIENT-LVL III: ICD-10-PCS | Mod: PBBFAC,,, | Performed by: PODIATRIST

## 2022-03-02 PROCEDURE — 3051F PR MOST RECENT HEMOGLOBIN A1C LEVEL 7.0 - < 8.0%: ICD-10-PCS | Mod: CPTII,S$GLB,, | Performed by: PODIATRIST

## 2022-03-02 PROCEDURE — 1160F RVW MEDS BY RX/DR IN RCRD: CPT | Mod: CPTII,S$GLB,, | Performed by: PODIATRIST

## 2022-03-02 NOTE — PROGRESS NOTES
Subjective:      Patient ID: Mariann Huff is a 61 y.o. female.    Chief Complaint: Diabetes Mellitus (PCP 2/22/2022) and Diabetic Foot Exam (Left foot, wound on heel)    Mariann is a 61 y.o. female who presents to the podiatry clinic  with complaint of  left foot skin lesion on the heel. Onset of the symptoms was several weeks ago. Precipitating event: none known. Current symptoms include: hard area of skin with moderate pain on palpation and pressure. Aggravating factors: any weight bearing and direct pressure. Symptoms have progressed to a point and plateaued. Patient has had prior foot problems. Evaluation to date: none. Treatment to date: none. Patients rates pain 0/10 on pain scale currently but 4-5/10 at worst. Was told by PCP that it may be a wound and she should have it evaluated by Podiatry since she is diabetic.     Chief Complaint   Patient presents with    Diabetes Mellitus     PCP 2/22/2022    Diabetic Foot Exam     Left foot, wound on heel       Vitals:    03/02/22 1436   BP: 122/66   Pulse: 90   Weight: 69.7 kg (153 lb 10.6 oz)   PainSc: 0-No pain         Review of Systems   Constitutional: Negative for chills, fever and malaise/fatigue.   HENT: Negative for hearing loss.    Cardiovascular: Negative for claudication.   Respiratory: Negative for shortness of breath.    Skin: Positive for suspicious lesions. Negative for color change, flushing, nail changes, rash and unusual hair distribution.   Musculoskeletal: Negative for joint pain and myalgias.        Heel pain L, skin lesion    Neurological: Positive for paresthesias. Negative for loss of balance, numbness and sensory change.   Psychiatric/Behavioral: Negative for altered mental status.           Objective:      Physical Exam  Vitals reviewed.   Constitutional:       Appearance: She is well-developed.   Cardiovascular:      Pulses:           Dorsalis pedis pulses are 1+ on the right side and 1+ on the left side.        Posterior tibial  pulses are 2+ on the right side and 2+ on the left side.      Comments:    Musculoskeletal:      Right lower le+ Edema present.      Left lower le+ Edema present.      Right ankle: Decreased range of motion. Abnormal pulse.      Right Achilles Tendon: Nick's test negative.      Left ankle: Decreased range of motion. Abnormal pulse.      Left Achilles Tendon: Nick's test negative.      Right foot: Decreased range of motion.      Left foot: Decreased range of motion.      Comments: Pain on palpation of lateral plantar L heel at location of skin lesion, see skin section. Otherwise rectus foot and toe position bilateral with no major deformities noted. Mild equinus noted b/l ankles with < 10 deg DF noted. MMT 5/5 in DF/PF/Inv/Ev resistance with no reproduction of pain in any direction. Passive range of motion of ankle and pedal joints is painless b/l.       Feet:      Right foot:      Protective Sensation: 5 sites tested. 2 sites sensed.      Left foot:      Protective Sensation: 5 sites tested. 2 sites sensed.   Skin:     General: Skin is dry.      Capillary Refill: Capillary refill takes more than 3 seconds.      Comments:  1-1.5 cm hyperkeratotic skin lesion to lateral L heel with dry scab overlying a healed fragile epithelium. No current open wound, erythema, drainage or SOI. Mild spongy white periphery. No embedded capillaries noted. No underlying tissue damage. See debridement before and after image of lesion below. Overall skin is thin, shiny, atrophic. Mild hyperpigmentation to skin of both ankles and/or feet, consistent with hemosiderin deposits. Loss of pedal hair b/l.    Neurological:      Mental Status: She is alert.      Sensory: Sensory deficit present.      Comments: diminished sensation noted to toes of b/L lower extremities   Psychiatric:         Behavior: Behavior normal. Behavior is cooperative.       Pre and post debridement                 Assessment:       Encounter Diagnoses   Name  Primary?    Type 2 diabetes mellitus with stage 2 chronic kidney disease and hypertension Yes    Corn or callus          Plan:       Mariann was seen today for diabetes mellitus and diabetic foot exam.    Diagnoses and all orders for this visit:    Type 2 diabetes mellitus with stage 2 chronic kidney disease and hypertension  -     Ambulatory referral/consult to Podiatry    Corn or callus      I counseled the patient on her conditions, their implications and medical management.    - Shoe inspection. Diabetic Foot Education. Patient reminded of the importance of good nutrition and blood sugar control to help prevent podiatric complications of diabetes. Patient instructed on proper foot hygeine. We discussed wearing proper shoe gear, daily foot inspections, never walking without protective shoe gear, caution putting sharp instruments to feet     - Discussed DM foot care:  Wear comfortable, proper fitting shoes. Wash feet daily. Dry well. After drying, apply moisturizer to feet (no lotion to webspaces). Inspect feet daily for skin breaks, blisters, swelling, or redness. Wear cotton socks (preferably white)  Change socks every day. Do NOT walk barefoot. Do NOT use heating pads or warm/hot water soaks     The affected area was cleansed with an alcohol prep pad. Next, utilizing a 5mm curette, the hyperkeratotic tissues were trimmed from plantar lateral L heel, down to appropriate level of skin. Care was taken to remove any nucleated core from the center of the lesion. No pinpoint bleeding was encountered. The patient tolerated relief following this procedure.    Differential Dx includes healed ulcer, plantars wart.     Will allow body to heal on its own over the next 2 weeks and will re-evaluate at that time for possible wart treatment.     RTC 2 weeks, sooner PRN (she should be returning every 2-3 months for routine DM foot care)

## 2022-03-07 ENCOUNTER — PATIENT MESSAGE (OUTPATIENT)
Dept: OTHER | Facility: OTHER | Age: 62
End: 2022-03-07
Payer: COMMERCIAL

## 2022-03-11 ENCOUNTER — TELEPHONE (OUTPATIENT)
Dept: CARDIOLOGY | Facility: CLINIC | Age: 62
End: 2022-03-11
Payer: COMMERCIAL

## 2022-03-11 DIAGNOSIS — I15.2 HYPERTENSION ASSOCIATED WITH DIABETES: ICD-10-CM

## 2022-03-11 DIAGNOSIS — E11.59 HYPERTENSION ASSOCIATED WITH DIABETES: ICD-10-CM

## 2022-03-11 DIAGNOSIS — E78.5 HYPERLIPIDEMIA ASSOCIATED WITH TYPE 2 DIABETES MELLITUS: Primary | ICD-10-CM

## 2022-03-11 DIAGNOSIS — E11.69 HYPERLIPIDEMIA ASSOCIATED WITH TYPE 2 DIABETES MELLITUS: Primary | ICD-10-CM

## 2022-03-11 RX ORDER — ESZOPICLONE 2 MG/1
2 TABLET, FILM COATED ORAL NIGHTLY
Qty: 30 TABLET | Refills: 0 | Status: SHIPPED | OUTPATIENT
Start: 2022-03-11 | End: 2022-04-16 | Stop reason: SDUPTHER

## 2022-03-11 RX ORDER — ATORVASTATIN CALCIUM 40 MG/1
40 TABLET, FILM COATED ORAL NIGHTLY
Qty: 90 TABLET | Refills: 3 | Status: SHIPPED | OUTPATIENT
Start: 2022-03-11 | End: 2023-04-05 | Stop reason: SDUPTHER

## 2022-03-16 ENCOUNTER — HOSPITAL ENCOUNTER (OUTPATIENT)
Dept: RADIOLOGY | Facility: HOSPITAL | Age: 62
Discharge: HOME | End: 2022-03-16
Attending: PODIATRIST
Payer: COMMERCIAL

## 2022-03-16 ENCOUNTER — PATIENT MESSAGE (OUTPATIENT)
Dept: PODIATRY | Facility: CLINIC | Age: 62
End: 2022-03-16

## 2022-03-16 ENCOUNTER — OFFICE VISIT (OUTPATIENT)
Dept: PODIATRY | Facility: CLINIC | Age: 62
End: 2022-03-16
Payer: COMMERCIAL

## 2022-03-16 ENCOUNTER — TELEPHONE (OUTPATIENT)
Dept: PODIATRY | Facility: CLINIC | Age: 62
End: 2022-03-16

## 2022-03-16 VITALS
HEIGHT: 64 IN | DIASTOLIC BLOOD PRESSURE: 71 MMHG | HEART RATE: 82 BPM | WEIGHT: 153.69 LBS | SYSTOLIC BLOOD PRESSURE: 132 MMHG | BODY MASS INDEX: 26.24 KG/M2

## 2022-03-16 DIAGNOSIS — M76.71 PERONEAL TENDINITIS, RIGHT: ICD-10-CM

## 2022-03-16 DIAGNOSIS — M79.671 FOOT PAIN, RIGHT: ICD-10-CM

## 2022-03-16 DIAGNOSIS — B07.0 PLANTAR WART, LEFT FOOT: Primary | ICD-10-CM

## 2022-03-16 PROCEDURE — 3008F BODY MASS INDEX DOCD: CPT | Mod: CPTII,S$GLB,, | Performed by: PODIATRIST

## 2022-03-16 PROCEDURE — 4010F ACE/ARB THERAPY RXD/TAKEN: CPT | Mod: CPTII,S$GLB,, | Performed by: PODIATRIST

## 2022-03-16 PROCEDURE — 3078F DIAST BP <80 MM HG: CPT | Mod: CPTII,S$GLB,, | Performed by: PODIATRIST

## 2022-03-16 PROCEDURE — 73630 X-RAY EXAM OF FOOT: CPT | Mod: 26,RT,, | Performed by: RADIOLOGY

## 2022-03-16 PROCEDURE — 99213 PR OFFICE/OUTPT VISIT, EST, LEVL III, 20-29 MIN: ICD-10-PCS | Mod: S$GLB,,, | Performed by: PODIATRIST

## 2022-03-16 PROCEDURE — 99999 PR PBB SHADOW E&M-EST. PATIENT-LVL V: ICD-10-PCS | Mod: PBBFAC,,, | Performed by: PODIATRIST

## 2022-03-16 PROCEDURE — 1160F PR REVIEW ALL MEDS BY PRESCRIBER/CLIN PHARMACIST DOCUMENTED: ICD-10-PCS | Mod: CPTII,S$GLB,, | Performed by: PODIATRIST

## 2022-03-16 PROCEDURE — 1159F MED LIST DOCD IN RCRD: CPT | Mod: CPTII,S$GLB,, | Performed by: PODIATRIST

## 2022-03-16 PROCEDURE — 3075F PR MOST RECENT SYSTOLIC BLOOD PRESS GE 130-139MM HG: ICD-10-PCS | Mod: CPTII,S$GLB,, | Performed by: PODIATRIST

## 2022-03-16 PROCEDURE — 99999 PR PBB SHADOW E&M-EST. PATIENT-LVL V: CPT | Mod: PBBFAC,,, | Performed by: PODIATRIST

## 2022-03-16 PROCEDURE — 1160F RVW MEDS BY RX/DR IN RCRD: CPT | Mod: CPTII,S$GLB,, | Performed by: PODIATRIST

## 2022-03-16 PROCEDURE — 3072F PR LOW RISK FOR RETINOPATHY: ICD-10-PCS | Mod: CPTII,S$GLB,, | Performed by: PODIATRIST

## 2022-03-16 PROCEDURE — 73630 X-RAY EXAM OF FOOT: CPT | Mod: TC,RT

## 2022-03-16 PROCEDURE — 3008F PR BODY MASS INDEX (BMI) DOCUMENTED: ICD-10-PCS | Mod: CPTII,S$GLB,, | Performed by: PODIATRIST

## 2022-03-16 PROCEDURE — 73630 XR FOOT COMPLETE 3 VIEW RIGHT: ICD-10-PCS | Mod: 26,RT,, | Performed by: RADIOLOGY

## 2022-03-16 PROCEDURE — 3078F PR MOST RECENT DIASTOLIC BLOOD PRESSURE < 80 MM HG: ICD-10-PCS | Mod: CPTII,S$GLB,, | Performed by: PODIATRIST

## 2022-03-16 PROCEDURE — 4010F PR ACE/ARB THEARPY RXD/TAKEN: ICD-10-PCS | Mod: CPTII,S$GLB,, | Performed by: PODIATRIST

## 2022-03-16 PROCEDURE — 3075F SYST BP GE 130 - 139MM HG: CPT | Mod: CPTII,S$GLB,, | Performed by: PODIATRIST

## 2022-03-16 PROCEDURE — 99213 OFFICE O/P EST LOW 20 MIN: CPT | Mod: S$GLB,,, | Performed by: PODIATRIST

## 2022-03-16 PROCEDURE — 3072F LOW RISK FOR RETINOPATHY: CPT | Mod: CPTII,S$GLB,, | Performed by: PODIATRIST

## 2022-03-16 PROCEDURE — 1159F PR MEDICATION LIST DOCUMENTED IN MEDICAL RECORD: ICD-10-PCS | Mod: CPTII,S$GLB,, | Performed by: PODIATRIST

## 2022-03-16 RX ORDER — DICLOFENAC SODIUM 10 MG/G
2 GEL TOPICAL 3 TIMES DAILY
Qty: 100 G | Refills: 3 | Status: ON HOLD | OUTPATIENT
Start: 2022-03-16 | End: 2023-09-01 | Stop reason: HOSPADM

## 2022-03-16 NOTE — TELEPHONE ENCOUNTER
----- Message from Katie Velásquez DPM sent at 3/16/2022  3:50 PM CDT -----  Please let her know that her xray looked fine. No fracture was noted. Continue with Darco shoe and Voltaren gel and follow up in 2 weeks as scheduled and we may get MRI if it not better by then. Thanks.

## 2022-03-16 NOTE — PROGRESS NOTES
"Subjective:      Patient ID: Mariann Huff is a 61 y.o. female.    Chief Complaint: Follow-up (LEFT FOOT/right having issues/PCP-[facility] 11/17/2021)    Mariann is a 61 y.o. female who presents to the podiatry clinic  with complaint of  left foot skin lesion on the heel. Onset of the symptoms was several weeks ago. Precipitating event: none known. Current symptoms include: hard area of skin with moderate pain on palpation and pressure. Aggravating factors: any weight bearing and direct pressure. Symptoms have progressed to a point and plateaued. Patient has had prior foot problems. Evaluation to date: none. Treatment to date: none. Patients rates pain 0/10 on pain scale currently but 4-5/10 at worst. Was told by PCP that it may be a wound and she should have it evaluated by Podiatry since she is diabetic.     03/16/2022: follow up for L heel skin lesion. States previous trimming helped for a few days but her pain returned and the hard skin returned. Has pain with direct touch and would like to have it assessed for further recommendations.     CC#2: R foot pain. Denies injury but has moderate 6/10 pain with walking and direct touch with shoes to the outside of midfoot. No swelling but does feel a small knot there. Would like to have this assessed.       Chief Complaint   Patient presents with    Follow-up     LEFT FOOT/right having issues  PCP-[facility] 11/17/2021       Vitals:    03/16/22 1101   BP: 132/71   Pulse: 82   Weight: 69.7 kg (153 lb 10.6 oz)   Height: 5' 4" (1.626 m)   PainSc: 0-No pain         Review of Systems   Constitutional: Negative for chills, fever and malaise/fatigue.   HENT: Negative for hearing loss.    Cardiovascular: Negative for claudication.   Respiratory: Negative for shortness of breath.    Skin: Positive for suspicious lesions. Negative for color change, flushing, nail changes, rash and unusual hair distribution.   Musculoskeletal: Negative for joint pain and myalgias.        Heel pain L, skin " lesion    Neurological: Positive for paresthesias. Negative for loss of balance, numbness and sensory change.   Psychiatric/Behavioral: Negative for altered mental status.           Objective:      Physical Exam  Vitals reviewed.   Constitutional:       Appearance: She is well-developed.   Cardiovascular:      Pulses:           Dorsalis pedis pulses are 1+ on the right side and 1+ on the left side.        Posterior tibial pulses are 2+ on the right side and 2+ on the left side.      Comments:    Musculoskeletal:      Right lower le+ Edema present.      Left lower le+ Edema present.      Right ankle: Decreased range of motion. Abnormal pulse.      Right Achilles Tendon: Nick's test negative.      Left ankle: Decreased range of motion. Abnormal pulse.      Left Achilles Tendon: Nick's test negative.      Right foot: Decreased range of motion.      Left foot: Decreased range of motion.      Comments: Pain on palpation of lateral plantar L heel at location of skin lesion, see skin section. Pain with palpation of R 5th met base at location of PB insertion. Small palpable area of induration measuring 0.5cm at base of 5th metatarsal with pain on palpation and manipulation.  Otherwise rectus foot and toe position bilateral with no major deformities noted. Mild equinus noted b/l ankles with < 10 deg DF noted. MMT 5/5 in DF/PF/Inv/Ev resistance with ++ reproduction of pain in DF/Ev resistance R foot only. Passive range of motion of ankle and pedal joints is painless b/l.       Feet:      Right foot:      Protective Sensation: 5 sites tested. 2 sites sensed.      Left foot:      Protective Sensation: 5 sites tested. 2 sites sensed.   Skin:     General: Skin is dry.      Capillary Refill: Capillary refill takes more than 3 seconds.      Comments:  1-1.5 cm hyperkeratotic skin lesion to lateral L heel with hyperkeratotic tissue overlying a healed fragile, thin epithelium. No current open wound, erythema, drainage  or SOI. Mild spongy white central core. No embedded capillaries noted. No underlying tissue damage. Overall skin is thin, shiny, atrophic. Mild hyperpigmentation to skin of both ankles and/or feet, consistent with hemosiderin deposits. Loss of pedal hair b/l.    Neurological:      Mental Status: She is alert.      Sensory: Sensory deficit present.      Comments: diminished sensation noted to toes of b/L lower extremities   Psychiatric:         Behavior: Behavior normal. Behavior is cooperative.       Pre and post debridement                 Assessment:       Encounter Diagnoses   Name Primary?    Plantar wart, left foot Yes    Foot pain, right     Peroneal tendinitis, right          Plan:       Mariann was seen today for follow-up.    Diagnoses and all orders for this visit:    Plantar wart, left foot    Foot pain, right  -     X-Ray Foot Complete Right; Future    Peroneal tendinitis, right  -     X-Ray Foot Complete Right; Future    Other orders  -     diclofenac sodium (VOLTAREN) 1 % Gel; Apply 2 g topically 3 (three) times daily. Apply to the area of pain 2-3x per day or night as needed      I counseled the patient on her conditions, their implications and medical management.    - Shoe inspection. Diabetic Foot Education. Patient reminded of the importance of good nutrition and blood sugar control to help prevent podiatric complications of diabetes. Patient instructed on proper foot hygeine. We discussed wearing proper shoe gear, daily foot inspections, never walking without protective shoe gear, caution putting sharp instruments to feet     - Discussed DM foot care:  Wear comfortable, proper fitting shoes. Wash feet daily. Dry well. After drying, apply moisturizer to feet (no lotion to webspaces). Inspect feet daily for skin breaks, blisters, swelling, or redness. Wear cotton socks (preferably white)  Change socks every day. Do NOT walk barefoot. Do NOT use heating pads or warm/hot water soaks     For L heel  plantar wart/skin lesion:   The affected area was cleansed with an alcohol prep pad. Next, utilizing a 5mm curette, the hyperkeratotic tissues were trimmed from plantar lateral L heel, down to appropriate level of skin. Care was taken to remove any nucleated core from the center of the lesion. No pinpoint bleeding was encountered. The patient tolerated relief following this procedure. This was done as courtesy today.     Recommended OTC Salicylic acid 2 drops once daily, cover with bandaid. Advised to do this for 2 weeks, no longer. Monitor for complications including SOI and pain.     For R foot NEW pain:     Rx Voltaren gel to be applied to affected area up to 3-4 x daily as needed for pain.     Xray ordered right foot to assess for any obvious bony deformity, injury.     Darco shoe for protected ambulation.     RTC 2 weeks, sooner PRN. Consider MRI R foot if no improvement.

## 2022-03-21 ENCOUNTER — SPECIALTY PHARMACY (OUTPATIENT)
Dept: PHARMACY | Facility: CLINIC | Age: 62
End: 2022-03-21
Payer: COMMERCIAL

## 2022-03-21 NOTE — TELEPHONE ENCOUNTER
Specialty Pharmacy - Refill Coordination    Specialty Medication Orders Linked to Encounter    Flowsheet Row Most Recent Value   Medication #1 evolocumab (REPATHA SURECLICK) 140 mg/mL PnIj (Order#[id number], Rx#[id number])          Refill Questions - Documented Responses    Flowsheet Row Most Recent Value   Patient Availability and HIPAA Verification    Does patient want to proceed with activity? Yes   HIPAA/medical authority confirmed? Yes   Relationship to patient of person spoken to? Self   Refill Screening Questions    Would patient like to speak to a pharmacist? No   When does the patient need to receive the medication? 03/28/22   Refill Delivery Questions    How will the patient receive the medication? Delivery Toshia   When does the patient need to receive the medication? 03/28/22   Shipping Address Prescription   Address in [facility] confirmed and updated if neccessary? Yes   Expected Copay ($) 5   Is the patient able to afford the medication copay? Yes   Payment Method CC on file   Days supply of Refill 28   Supplies needed? No supplies needed   Refill activity completed? Yes   Refill activity plan Refill scheduled   Shipment/Pickup Date: 03/24/22          Current Outpatient Medications   Medication Sig    ACCU-CHEK EDIN PLUS METER Misc     albuterol (PROVENTIL/VENTOLIN HFA) 90 mcg/actuation inhaler Inhale 2 puffs into the lungs every 6 (six) hours as needed for Wheezing.    albuterol-ipratropium (DUO-NEB) 2.5 mg-0.5 mg/3 mL nebulizer solution Inhale 3 mLs into the lungs.    amLODIPine (NORVASC) 10 MG tablet Take 1 tablet (10 mg total) by mouth once daily.    aspirin 81 mg Tab Take 81 mg by mouth. 1 Tablet Oral Every day    atorvastatin (LIPITOR) 40 MG tablet Take 1 tablet (40 mg total) by mouth every evening.    blood sugar diagnostic (ACCU-CHEK EDIN PLUS TEST STRP) Strp TEST BLOOD SUGARS 4 TIMES DAILY    blood-glucose meter,continuous (DEXCOM G6 ) Misc 1 each by Misc.(Non-Drug;  "Combo Route) route continuous    blood-glucose sensor (DEXCOM G6 SENSOR) Leann Change sensor every 10 days    blood-glucose transmitter (DEXCOM G6 TRANSMITTER) Leann Use as directed    clopidogreL (PLAVIX) 75 mg tablet Take 1 tablet (75 mg total) by mouth once daily.    dapagliflozin (FARXIGA) 10 mg tablet Take 1 tablet (10 mg total) by mouth once daily.    diclofenac sodium (VOLTAREN) 1 % Gel Apply 2 g topically 3 (three) times daily. Apply to the area of pain 2-3x per day or night as needed    dulaglutide (TRULICITY) 4.5 mg/0.5 mL pen injector Inject 4.5 mg into the skin every 7 days.    EScitalopram oxalate (LEXAPRO) 10 MG tablet TAKE 1 TABLET BY MOUTH EVERY DAY    eszopiclone (LUNESTA) 2 MG Tab Take 1 tablet (2 mg total) by mouth every evening.    evolocumab (REPATHA SURECLICK) 140 mg/mL PnIj Inject 1 mL (140 mg total) into the skin every 14 (fourteen) days.    hydroCHLOROthiazide (HYDRODIURIL) 12.5 MG Tab Take 1 tablet (12.5 mg total) by mouth once daily.    isosorbide mononitrate (ISMO,MONOKET) 10 mg tablet Take 1 tablet (10 mg total) by mouth once daily.    metoprolol tartrate (LOPRESSOR) 50 MG tablet Take 1 tablet (50 mg total) by mouth 2 (two) times daily.    multivitamin (THERAGRAN) per tablet Take 1 tablet by mouth once daily.    pen needle, diabetic (NOVOTWIST) 32 gauge x 1/5" Ndle Use 1 needle to inject insulin four times daily    potassium chloride SA (K-DUR,KLOR-CON) 20 MEQ tablet Take 1 tablet (20 mEq total) by mouth 2 (two) times daily.    promethazine (PHENERGAN) 12.5 MG Tab Take 1 tablet (12.5 mg total) by mouth every 8 (eight) hours as needed (nausea).    telmisartan (MICARDIS) 80 MG Tab TAKE 1 TABLET (80 MG TOTAL) BY MOUTH ONCE DAILY. STOP LOSARTAN    varicella-zoster gE-AS01B, PF, (SHINGRIX) 50 mcg/0.5 mL injection Inject into the muscle.   Last reviewed on 3/16/2022 11:27 AM by Katie Velásquez DPM    Review of patient's allergies indicates:   Allergen Reactions    Milk containing " products      Causes GI distress    Last reviewed on  3/16/2022 11:27 AM by Katie Velásquez      Tasks added this encounter   4/18/2022 - Refill Call (Auto Added)   Tasks due within next 3 months   No tasks due.     Dalia Anne, PharmD  [facility]  [address] [place], [place] [address]  [facility] [address]  Phone: [phone]  Fax: [phone]

## 2022-03-30 ENCOUNTER — OFFICE VISIT (OUTPATIENT)
Dept: PODIATRY | Facility: CLINIC | Age: 62
End: 2022-03-30
Payer: COMMERCIAL

## 2022-03-30 VITALS
BODY MASS INDEX: 26.38 KG/M2 | SYSTOLIC BLOOD PRESSURE: 182 MMHG | WEIGHT: 153.69 LBS | DIASTOLIC BLOOD PRESSURE: 89 MMHG | HEART RATE: 77 BPM

## 2022-03-30 DIAGNOSIS — R22.42 LOCALIZED SWELLING, MASS, OR LUMP OF LEFT LOWER EXTREMITY: ICD-10-CM

## 2022-03-30 DIAGNOSIS — B07.0 PLANTAR WART, LEFT FOOT: Primary | ICD-10-CM

## 2022-03-30 DIAGNOSIS — M79.9 SOFT TISSUE LESION OF FOOT: ICD-10-CM

## 2022-03-30 DIAGNOSIS — M79.672 PAIN OF LEFT HEEL: ICD-10-CM

## 2022-03-30 PROCEDURE — 3008F PR BODY MASS INDEX (BMI) DOCUMENTED: ICD-10-PCS | Mod: CPTII,S$GLB,, | Performed by: PODIATRIST

## 2022-03-30 PROCEDURE — 3077F SYST BP >= 140 MM HG: CPT | Mod: CPTII,S$GLB,, | Performed by: PODIATRIST

## 2022-03-30 PROCEDURE — 3008F BODY MASS INDEX DOCD: CPT | Mod: CPTII,S$GLB,, | Performed by: PODIATRIST

## 2022-03-30 PROCEDURE — 3077F PR MOST RECENT SYSTOLIC BLOOD PRESSURE >= 140 MM HG: ICD-10-PCS | Mod: CPTII,S$GLB,, | Performed by: PODIATRIST

## 2022-03-30 PROCEDURE — 4010F PR ACE/ARB THEARPY RXD/TAKEN: ICD-10-PCS | Mod: CPTII,S$GLB,, | Performed by: PODIATRIST

## 2022-03-30 PROCEDURE — 1160F RVW MEDS BY RX/DR IN RCRD: CPT | Mod: CPTII,S$GLB,, | Performed by: PODIATRIST

## 2022-03-30 PROCEDURE — 99999 PR PBB SHADOW E&M-EST. PATIENT-LVL IV: ICD-10-PCS | Mod: PBBFAC,,, | Performed by: PODIATRIST

## 2022-03-30 PROCEDURE — 99213 PR OFFICE/OUTPT VISIT, EST, LEVL III, 20-29 MIN: ICD-10-PCS | Mod: S$GLB,,, | Performed by: PODIATRIST

## 2022-03-30 PROCEDURE — 99213 OFFICE O/P EST LOW 20 MIN: CPT | Mod: S$GLB,,, | Performed by: PODIATRIST

## 2022-03-30 PROCEDURE — 3072F LOW RISK FOR RETINOPATHY: CPT | Mod: CPTII,S$GLB,, | Performed by: PODIATRIST

## 2022-03-30 PROCEDURE — 1159F PR MEDICATION LIST DOCUMENTED IN MEDICAL RECORD: ICD-10-PCS | Mod: CPTII,S$GLB,, | Performed by: PODIATRIST

## 2022-03-30 PROCEDURE — 99999 PR PBB SHADOW E&M-EST. PATIENT-LVL IV: CPT | Mod: PBBFAC,,, | Performed by: PODIATRIST

## 2022-03-30 PROCEDURE — 4010F ACE/ARB THERAPY RXD/TAKEN: CPT | Mod: CPTII,S$GLB,, | Performed by: PODIATRIST

## 2022-03-30 PROCEDURE — 3079F DIAST BP 80-89 MM HG: CPT | Mod: CPTII,S$GLB,, | Performed by: PODIATRIST

## 2022-03-30 PROCEDURE — 3072F PR LOW RISK FOR RETINOPATHY: ICD-10-PCS | Mod: CPTII,S$GLB,, | Performed by: PODIATRIST

## 2022-03-30 PROCEDURE — 3079F PR MOST RECENT DIASTOLIC BLOOD PRESSURE 80-89 MM HG: ICD-10-PCS | Mod: CPTII,S$GLB,, | Performed by: PODIATRIST

## 2022-03-30 PROCEDURE — 1160F PR REVIEW ALL MEDS BY PRESCRIBER/CLIN PHARMACIST DOCUMENTED: ICD-10-PCS | Mod: CPTII,S$GLB,, | Performed by: PODIATRIST

## 2022-03-30 PROCEDURE — 1159F MED LIST DOCD IN RCRD: CPT | Mod: CPTII,S$GLB,, | Performed by: PODIATRIST

## 2022-03-30 NOTE — PROGRESS NOTES
Subjective:      Patient ID: Mariann Huff is a 62 y.o. female.    Chief Complaint: Foot Pain (Left foot)    Mariann is a 62 y.o. female who presents to the podiatry clinic  with complaint of  left foot skin lesion on the heel. Onset of the symptoms was several weeks ago. Precipitating event: none known. Current symptoms include: hard area of skin with moderate pain on palpation and pressure. Aggravating factors: any weight bearing and direct pressure. Symptoms have progressed to a point and plateaued. Patient has had prior foot problems. Evaluation to date: none. Treatment to date: none. Patients rates pain 0/10 on pain scale currently but 4-5/10 at worst. Was told by PCP that it may be a wound and she should have it evaluated by Podiatry since she is diabetic.     03/16/2022: follow up for L heel skin lesion. States previous trimming helped for a few days but her pain returned and the hard skin returned. Has pain with direct touch and would like to have it assessed for further recommendations.     CC#2: R foot pain. Denies injury but has moderate 6/10 pain with walking and direct touch with shoes to the outside of midfoot. No swelling but does feel a small knot there. Would like to have this assessed.     03/30/2022: follow up for L heel skin lesion. Still bothering her with pain. Using OTC salicylic acid on it daily for 2 weeks, stopped using 2 days ago. Here for further recommendations. R foot no longer bothering her.         Chief Complaint   Patient presents with    Foot Pain     Left foot       Vitals:    03/30/22 1110   BP: (!) 182/89   Pulse: 77   Weight: 69.7 kg (153 lb 10.6 oz)   PainSc:   4   PainLoc: Foot         Review of Systems   Constitutional: Negative for chills, fever and malaise/fatigue.   HENT: Negative for hearing loss.    Cardiovascular: Negative for claudication.   Respiratory: Negative for shortness of breath.    Skin: Positive for suspicious lesions. Negative for color change,  flushing, nail changes, rash and unusual hair distribution.   Musculoskeletal: Negative for joint pain and myalgias.        Heel pain L, skin lesion    Neurological: Positive for paresthesias. Negative for loss of balance, numbness and sensory change.   Psychiatric/Behavioral: Negative for altered mental status.           Objective:      Physical Exam  Vitals reviewed.   Constitutional:       Appearance: She is well-developed.   Cardiovascular:      Pulses:           Dorsalis pedis pulses are 1+ on the right side and 1+ on the left side.        Posterior tibial pulses are 2+ on the right side and 2+ on the left side.      Comments:    Musculoskeletal:      Right lower le+ Edema present.      Left lower le+ Edema present.      Right ankle: Decreased range of motion. Abnormal pulse.      Right Achilles Tendon: Nick's test negative.      Left ankle: Decreased range of motion. Abnormal pulse.      Left Achilles Tendon: Nick's test negative.      Right foot: Decreased range of motion.      Left foot: Decreased range of motion.      Comments: Pain on palpation of lateral plantar L heel at location of skin lesion, see skin section. Pain with palpation of R 5th met base at location of PB insertion. Small palpable area of induration measuring 0.5cm at base of 5th metatarsal with pain on palpation and manipulation.  Otherwise rectus foot and toe position bilateral with no major deformities noted. Mild equinus noted b/l ankles with < 10 deg DF noted. MMT 5/5 in DF/PF/Inv/Ev resistance with ++ reproduction of pain in DF/Ev resistance R foot only. Passive range of motion of ankle and pedal joints is painless b/l.       Feet:      Right foot:      Protective Sensation: 5 sites tested. 2 sites sensed.      Left foot:      Protective Sensation: 5 sites tested. 2 sites sensed.   Skin:     General: Skin is dry.      Capillary Refill: Capillary refill takes more than 3 seconds.      Comments: 1-1.5 cm hyperkeratotic  skin lesion to lateral L heel with hyperkeratotic tissue overlying a healed fragile, thin epithelium. No current open wound, erythema, drainage or SOI. Mild spongy white central core. No embedded capillaries noted. No underlying tissue damage. No pinpoint bleeding to lesion. Overall skin is thin, shiny, atrophic. Mild hyperpigmentation to skin of both ankles and/or feet, consistent with hemosiderin deposits. Loss of pedal hair b/l.    Neurological:      Mental Status: She is alert.      Sensory: Sensory deficit present.      Comments: diminished sensation noted to toes of b/L lower extremities   Psychiatric:         Behavior: Behavior normal. Behavior is cooperative.       Pre and post debridement                 Assessment:       Encounter Diagnoses   Name Primary?    Plantar wart, left foot Yes    Pain of left heel     Soft tissue lesion of foot     Localized swelling, mass, or lump of left lower extremity          Plan:       Mariann was seen today for foot pain.    Diagnoses and all orders for this visit:    Plantar wart, left foot    Pain of left heel    Soft tissue lesion of foot    Localized swelling, mass, or lump of left lower extremity  -     MRI Foot (Hindfoot) Left Without Contrast; Future      I counseled the patient on her conditions, their implications and medical management.    - Shoe inspection. Diabetic Foot Education. Patient reminded of the importance of good nutrition and blood sugar control to help prevent podiatric complications of diabetes. Patient instructed on proper foot hygeine. We discussed wearing proper shoe gear, daily foot inspections, never walking without protective shoe gear, caution putting sharp instruments to feet     - Discussed DM foot care:  Wear comfortable, proper fitting shoes. Wash feet daily. Dry well. After drying, apply moisturizer to feet (no lotion to webspaces). Inspect feet daily for skin breaks, blisters, swelling, or redness. Wear cotton socks (preferably  white)  Change socks every day. Do NOT walk barefoot. Do NOT use heating pads or warm/hot water soaks     For L heel plantar wart/skin lesion:   The affected area was cleansed with an alcohol prep pad. Next, utilizing a 5mm curette, the hyperkeratotic tissues were trimmed from plantar lateral L heel, down to appropriate level of skin. Care was taken to remove any nucleated core from the center of the lesion. No pinpoint bleeding was encountered. The patient tolerated relief following this procedure. This was done as courtesy today.     Recommended d/c any treatment for 1 week after which ok to restart OTC Salicylic acid 2 drops once daily, cover with bandaid. Advised to do this for 2 weeks, no longer. Monitor for complications including SOI and pain.     MRI ordered of left hindfoot to assess soft tissues for injury and/or damage given equivocal xray results. Rule out soft tissue abnormality.     Consider Cantharone once available.     Consider biopsy if no response to cantharone or if MRI shows something unusual.     Consider excision if no resolution with conservative care    For R foot pain:   Resolved    RTC 2-3 weeks (after MRI), sooner PRN

## 2022-03-31 ENCOUNTER — HOSPITAL ENCOUNTER (OUTPATIENT)
Dept: RADIOLOGY | Facility: HOSPITAL | Age: 62
Discharge: HOME | End: 2022-03-31
Attending: UROLOGY
Payer: COMMERCIAL

## 2022-03-31 DIAGNOSIS — Z98.890 S/P URETERAL REIMPLANTATION: ICD-10-CM

## 2022-03-31 PROCEDURE — 76770 US KIDNEY: ICD-10-PCS | Mod: 26,,, | Performed by: RADIOLOGY

## 2022-03-31 PROCEDURE — 76770 US EXAM ABDO BACK WALL COMP: CPT | Mod: TC

## 2022-03-31 PROCEDURE — 76770 US EXAM ABDO BACK WALL COMP: CPT | Mod: 26,,, | Performed by: RADIOLOGY

## 2022-04-04 ENCOUNTER — TELEPHONE (OUTPATIENT)
Dept: CARDIOLOGY | Facility: CLINIC | Age: 62
End: 2022-04-04
Payer: COMMERCIAL

## 2022-04-04 NOTE — TELEPHONE ENCOUNTER
----- Message from Adelaide Aguilar MD sent at 4/4/2022 12:35 PM CDT -----  Please review.  We will discuss the results during your upcoming visit with me. The results look good except for Diabetes, that continues to  be uncontrolled. KATY.

## 2022-04-06 ENCOUNTER — PATIENT OUTREACH (OUTPATIENT)
Dept: ADMINISTRATIVE | Facility: OTHER | Age: 62
End: 2022-04-06
Payer: COMMERCIAL

## 2022-04-07 ENCOUNTER — OFFICE VISIT (OUTPATIENT)
Dept: CARDIOLOGY | Facility: CLINIC | Age: 62
End: 2022-04-07
Payer: COMMERCIAL

## 2022-04-07 ENCOUNTER — PATIENT MESSAGE (OUTPATIENT)
Dept: INTERNAL MEDICINE | Facility: CLINIC | Age: 62
End: 2022-04-07
Payer: COMMERCIAL

## 2022-04-07 VITALS
HEIGHT: 64 IN | HEART RATE: 73 BPM | WEIGHT: 156.5 LBS | DIASTOLIC BLOOD PRESSURE: 64 MMHG | SYSTOLIC BLOOD PRESSURE: 134 MMHG | BODY MASS INDEX: 26.72 KG/M2

## 2022-04-07 DIAGNOSIS — I65.23 CAROTID STENOSIS, BILATERAL: ICD-10-CM

## 2022-04-07 DIAGNOSIS — I25.2 HISTORY OF NON-ST ELEVATION MYOCARDIAL INFARCTION (NSTEMI): Chronic | ICD-10-CM

## 2022-04-07 DIAGNOSIS — E11.22 TYPE 2 DIABETES MELLITUS WITH STAGE 2 CHRONIC KIDNEY DISEASE AND HYPERTENSION: Chronic | ICD-10-CM

## 2022-04-07 DIAGNOSIS — E11.69 HYPERLIPIDEMIA ASSOCIATED WITH TYPE 2 DIABETES MELLITUS: Chronic | ICD-10-CM

## 2022-04-07 DIAGNOSIS — I15.2 HYPERTENSION ASSOCIATED WITH DIABETES: Chronic | ICD-10-CM

## 2022-04-07 DIAGNOSIS — E11.59 HYPERTENSION ASSOCIATED WITH DIABETES: Chronic | ICD-10-CM

## 2022-04-07 DIAGNOSIS — E11.42 DIABETIC PERIPHERAL NEUROPATHY ASSOCIATED WITH TYPE 2 DIABETES MELLITUS: ICD-10-CM

## 2022-04-07 DIAGNOSIS — G47.30 SLEEP APNEA, UNSPECIFIED TYPE: Chronic | ICD-10-CM

## 2022-04-07 DIAGNOSIS — N18.2 TYPE 2 DIABETES MELLITUS WITH STAGE 2 CHRONIC KIDNEY DISEASE AND HYPERTENSION: Chronic | ICD-10-CM

## 2022-04-07 DIAGNOSIS — I73.9 PAD (PERIPHERAL ARTERY DISEASE): ICD-10-CM

## 2022-04-07 DIAGNOSIS — E78.5 HYPERLIPIDEMIA ASSOCIATED WITH TYPE 2 DIABETES MELLITUS: Chronic | ICD-10-CM

## 2022-04-07 DIAGNOSIS — R26.89 POOR BALANCE: ICD-10-CM

## 2022-04-07 DIAGNOSIS — I25.10 CORONARY ARTERY DISEASE INVOLVING NATIVE CORONARY ARTERY OF NATIVE HEART WITHOUT ANGINA PECTORIS: Primary | Chronic | ICD-10-CM

## 2022-04-07 DIAGNOSIS — Z95.5 S/P CORONARY ARTERY STENT PLACEMENT: ICD-10-CM

## 2022-04-07 DIAGNOSIS — I12.9 TYPE 2 DIABETES MELLITUS WITH STAGE 2 CHRONIC KIDNEY DISEASE AND HYPERTENSION: Chronic | ICD-10-CM

## 2022-04-07 DIAGNOSIS — I50.32 CHRONIC HEART FAILURE WITH PRESERVED EJECTION FRACTION: Chronic | ICD-10-CM

## 2022-04-07 DIAGNOSIS — K76.0 FATTY LIVER DISEASE, NONALCOHOLIC: ICD-10-CM

## 2022-04-07 PROCEDURE — 4010F ACE/ARB THERAPY RXD/TAKEN: CPT | Mod: CPTII,S$GLB,, | Performed by: INTERNAL MEDICINE

## 2022-04-07 PROCEDURE — 3008F BODY MASS INDEX DOCD: CPT | Mod: CPTII,S$GLB,, | Performed by: INTERNAL MEDICINE

## 2022-04-07 PROCEDURE — 3075F SYST BP GE 130 - 139MM HG: CPT | Mod: CPTII,S$GLB,, | Performed by: INTERNAL MEDICINE

## 2022-04-07 PROCEDURE — 99999 PR PBB SHADOW E&M-EST. PATIENT-LVL V: CPT | Mod: PBBFAC,,, | Performed by: INTERNAL MEDICINE

## 2022-04-07 PROCEDURE — 3051F HG A1C>EQUAL 7.0%<8.0%: CPT | Mod: CPTII,S$GLB,, | Performed by: INTERNAL MEDICINE

## 2022-04-07 PROCEDURE — 99214 PR OFFICE/OUTPT VISIT, EST, LEVL IV, 30-39 MIN: ICD-10-PCS | Mod: S$GLB,,, | Performed by: INTERNAL MEDICINE

## 2022-04-07 PROCEDURE — 3051F PR MOST RECENT HEMOGLOBIN A1C LEVEL 7.0 - < 8.0%: ICD-10-PCS | Mod: CPTII,S$GLB,, | Performed by: INTERNAL MEDICINE

## 2022-04-07 PROCEDURE — 3078F DIAST BP <80 MM HG: CPT | Mod: CPTII,S$GLB,, | Performed by: INTERNAL MEDICINE

## 2022-04-07 PROCEDURE — 99999 PR PBB SHADOW E&M-EST. PATIENT-LVL V: ICD-10-PCS | Mod: PBBFAC,,, | Performed by: INTERNAL MEDICINE

## 2022-04-07 PROCEDURE — 3072F LOW RISK FOR RETINOPATHY: CPT | Mod: CPTII,S$GLB,, | Performed by: INTERNAL MEDICINE

## 2022-04-07 PROCEDURE — 1160F RVW MEDS BY RX/DR IN RCRD: CPT | Mod: CPTII,S$GLB,, | Performed by: INTERNAL MEDICINE

## 2022-04-07 PROCEDURE — 99214 OFFICE O/P EST MOD 30 MIN: CPT | Mod: S$GLB,,, | Performed by: INTERNAL MEDICINE

## 2022-04-07 PROCEDURE — 3075F PR MOST RECENT SYSTOLIC BLOOD PRESS GE 130-139MM HG: ICD-10-PCS | Mod: CPTII,S$GLB,, | Performed by: INTERNAL MEDICINE

## 2022-04-07 PROCEDURE — 3078F PR MOST RECENT DIASTOLIC BLOOD PRESSURE < 80 MM HG: ICD-10-PCS | Mod: CPTII,S$GLB,, | Performed by: INTERNAL MEDICINE

## 2022-04-07 PROCEDURE — 1160F PR REVIEW ALL MEDS BY PRESCRIBER/CLIN PHARMACIST DOCUMENTED: ICD-10-PCS | Mod: CPTII,S$GLB,, | Performed by: INTERNAL MEDICINE

## 2022-04-07 PROCEDURE — 3072F PR LOW RISK FOR RETINOPATHY: ICD-10-PCS | Mod: CPTII,S$GLB,, | Performed by: INTERNAL MEDICINE

## 2022-04-07 PROCEDURE — 4010F PR ACE/ARB THEARPY RXD/TAKEN: ICD-10-PCS | Mod: CPTII,S$GLB,, | Performed by: INTERNAL MEDICINE

## 2022-04-07 PROCEDURE — 1159F MED LIST DOCD IN RCRD: CPT | Mod: CPTII,S$GLB,, | Performed by: INTERNAL MEDICINE

## 2022-04-07 PROCEDURE — 1159F PR MEDICATION LIST DOCUMENTED IN MEDICAL RECORD: ICD-10-PCS | Mod: CPTII,S$GLB,, | Performed by: INTERNAL MEDICINE

## 2022-04-07 PROCEDURE — 3008F PR BODY MASS INDEX (BMI) DOCUMENTED: ICD-10-PCS | Mod: CPTII,S$GLB,, | Performed by: INTERNAL MEDICINE

## 2022-04-08 NOTE — PROGRESS NOTES
Subjective:   Patient ID:  Mariann Huff is a 62 y.o. female who presents for follow-up of coronary artery disease and hypertensive heart disease.    HPI: Recent history: taking medications as instructed, no medication side effects noted, no TIA's, no chest pain on exertion, no dyspnea on exertion and no swelling of ankles. She c/o legs being tired while walking. She is waiting for insurance approval to start taking Rapatha again.    Diabetes in better controlled. Patient is cooking more and eating out less.  She is also on GLP1 agonist and SGLT2 inhibitor.  Blood pressure is monitored by [facility] and she is doing well.    Blood work shows high sodium and lipids at goal.    Lab Results   Component Value Date     (H) 03/31/2022    K 4.3 03/31/2022     03/31/2022    CO2 27 03/31/2022    BUN 24 (H) 03/31/2022    CREATININE 1.09 03/31/2022     (H) 03/31/2022    HGBA1C 7.7 (H) 03/31/2022    MG 2.0 08/20/2020    AST 43 03/31/2022    ALT 20 03/31/2022    ALBUMIN 4.9 03/31/2022    PROT 8.8 (H) 03/31/2022    BILITOT 1.0 03/31/2022    WBC 6.57 02/25/2022    HGB 11.9 (L) 02/25/2022    HCT 39.1 02/25/2022    HCT 19 (LL) 05/19/2019    MCV 88 02/25/2022     02/25/2022    INR 0.9 11/25/2019    TSH 1.000 03/31/2022    CHOL 102 (L) 03/31/2022    HDL 49 03/31/2022    LDLCALC 35.4 (L) 03/31/2022    TRIG 88 03/31/2022       No results found in the last 24 hours.     Review of Systems   Constitutional: Positive for weight gain.   HENT: Negative.    Eyes: Negative.    Cardiovascular: Positive for claudication. Negative for chest pain, dyspnea on exertion, irregular heartbeat, leg swelling, near-syncope, palpitations and syncope.   Respiratory: Positive for cough. Negative for shortness of breath, snoring and wheezing.    Endocrine: Negative.  Negative for cold intolerance, heat intolerance, polydipsia, polyphagia and polyuria.   Skin: Negative.    Musculoskeletal: Positive for back pain.  "  Gastrointestinal: Negative.    Genitourinary: Negative.    Neurological: Positive for loss of balance.   Psychiatric/Behavioral: Negative.        Objective:   Physical Exam  Vitals and nursing note reviewed.   Constitutional:       Appearance: She is well-developed. She is obese.      Comments: /64   Pulse 73   Ht 5' 4" (1.626 m)   Wt 71 kg (156 lb 8.4 oz)   LMP  (LMP Unknown)   BMI 26.87 kg/m²      HENT:      Head: Normocephalic.   Eyes:      Pupils: Pupils are equal, round, and reactive to light.   Neck:      Thyroid: No thyromegaly.      Vascular: No carotid bruit.   Cardiovascular:      Rate and Rhythm: Normal rate and regular rhythm.      Pulses: Intact distal pulses.           Carotid pulses are 2+ on the right side and 2+ on the left side.       Radial pulses are 2+ on the right side and 2+ on the left side.        Femoral pulses are 2+ on the right side and 2+ on the left side.       Popliteal pulses are 2+ on the right side and 2+ on the left side.        Dorsalis pedis pulses are 1+ on the right side and 1+ on the left side.        Posterior tibial pulses are 1+ on the right side and 1+ on the left side.      Heart sounds: Normal heart sounds. No murmur heard.    No friction rub. No gallop.   Pulmonary:      Effort: Pulmonary effort is normal. No respiratory distress.      Breath sounds: Normal breath sounds. No wheezing or rales.   Chest:      Chest wall: No tenderness.   Abdominal:      General: Bowel sounds are normal.      Palpations: Abdomen is soft.   Musculoskeletal:         General: Normal range of motion.      Cervical back: Normal range of motion and neck supple.   Skin:     General: Skin is warm and dry.   Neurological:      Mental Status: She is alert and oriented to person, place, and time.           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    ALBUTEROL-IPRATROPIUM (DUO-NEB) 2.5 MG-0.5 MG/3 ML NEBULIZER SOLUTION    Inhale 3 mLs into the lungs.    AMLODIPINE (NORVASC) 10 MG TABLET    Take 1 tablet (10 mg total) by mouth once daily.    ASPIRIN 81 MG TAB    Take 81 mg by mouth. 1 Tablet Oral Every day    ATORVASTATIN (LIPITOR) 40 MG TABLET    Take 1 tablet (40 mg total) by mouth every evening.    BLOOD SUGAR DIAGNOSTIC (ACCU-CHEK EDIN PLUS TEST STRP) STRP    TEST BLOOD SUGARS 4 TIMES DAILY    BLOOD-GLUCOSE METER,CONTINUOUS (DEXCOM G6 ) MISC    1 each by Misc.(Non-Drug; Combo Route) route continuous    BLOOD-GLUCOSE SENSOR (DEXCOM G6 SENSOR) MICHAEL    Change sensor every 10 days    BLOOD-GLUCOSE TRANSMITTER (DEXCOM G6 TRANSMITTER) MICHAEL    Use as directed    CLOPIDOGREL (PLAVIX) 75 MG TABLET    Take 1 tablet (75 mg total) by mouth once daily.    DAPAGLIFLOZIN (FARXIGA) 10 MG TABLET    Take 1 tablet (10 mg total) by mouth once daily.    DICLOFENAC SODIUM  "(VOLTAREN) 1 % GEL    Apply 2 g topically 3 (three) times daily. Apply to the area of pain 2-3x per day or night as needed    DULAGLUTIDE (TRULICITY) 4.5 MG/0.5 ML PEN INJECTOR    Inject 4.5 mg into the skin every 7 days.    ESCITALOPRAM OXALATE (LEXAPRO) 10 MG TABLET    TAKE 1 TABLET BY MOUTH EVERY DAY    ESZOPICLONE (LUNESTA) 2 MG TAB    Take 1 tablet (2 mg total) by mouth every evening.    EVOLOCUMAB (REPATHA SURECLICK) 140 MG/ML PNIJ    Inject 1 mL (140 mg total) into the skin every 14 (fourteen) days.    HYDROCHLOROTHIAZIDE (HYDRODIURIL) 12.5 MG TAB    Take 1 tablet (12.5 mg total) by mouth once daily.    ISOSORBIDE MONONITRATE (ISMO,MONOKET) 10 MG TABLET    Take 1 tablet (10 mg total) by mouth once daily.    METOPROLOL TARTRATE (LOPRESSOR) 50 MG TABLET    Take 1 tablet (50 mg total) by mouth 2 (two) times daily.    MULTIVITAMIN (THERAGRAN) PER TABLET    Take 1 tablet by mouth once daily.    PEN NEEDLE, DIABETIC (NOVOTWIST) 32 GAUGE X 1/5" NDLE    Use 1 needle to inject insulin four times daily    POTASSIUM CHLORIDE SA (K-DUR,KLOR-CON) 20 MEQ TABLET    Take 1 tablet (20 mEq total) by mouth 2 (two) times daily.    PROMETHAZINE (PHENERGAN) 12.5 MG TAB    Take 1 tablet (12.5 mg total) by mouth every 8 (eight) hours as needed (nausea).    TELMISARTAN (MICARDIS) 80 MG TAB    TAKE 1 TABLET (80 MG TOTAL) BY MOUTH ONCE DAILY. STOP LOSARTAN    VARICELLA-ZOSTER GE-AS01B, PF, (SHINGRIX) 50 MCG/0.5 ML INJECTION    Inject into the muscle.   Modified Medications    No medications on file   Discontinued Medications    No medications on file     Repeat carotid duplex and check NIKKI's at rest and with exercise.  Lipids and LFT's and BMP in 2 months.  D/c Plavix.  Continue on ASA for life.  Increase free water intake, proper hydration encouraged.    No follow-ups on file.            "

## 2022-04-11 ENCOUNTER — DOCUMENTATION ONLY (OUTPATIENT)
Dept: CARDIOLOGY | Facility: CLINIC | Age: 62
End: 2022-04-11
Payer: COMMERCIAL

## 2022-04-11 ENCOUNTER — HOSPITAL ENCOUNTER (OUTPATIENT)
Dept: RADIOLOGY | Facility: HOSPITAL | Age: 62
Discharge: HOME | End: 2022-04-11
Attending: PODIATRIST
Payer: COMMERCIAL

## 2022-04-11 DIAGNOSIS — R22.42 LOCALIZED SWELLING, MASS, OR LUMP OF LEFT LOWER EXTREMITY: ICD-10-CM

## 2022-04-11 DIAGNOSIS — E11.69 HYPERLIPIDEMIA ASSOCIATED WITH TYPE 2 DIABETES MELLITUS: Primary | ICD-10-CM

## 2022-04-11 DIAGNOSIS — E78.5 HYPERLIPIDEMIA ASSOCIATED WITH TYPE 2 DIABETES MELLITUS: Primary | ICD-10-CM

## 2022-04-11 PROCEDURE — 73720 MRI LWR EXTREMITY W/O&W/DYE: CPT | Mod: TC,LT

## 2022-04-11 PROCEDURE — 73720 MRI FOOT (HINDFOOT) LEFT W W/O CONTRAST: ICD-10-PCS | Mod: 26,LT,, | Performed by: RADIOLOGY

## 2022-04-11 PROCEDURE — 73720 MRI LWR EXTREMITY W/O&W/DYE: CPT | Mod: 26,LT,, | Performed by: RADIOLOGY

## 2022-04-11 PROCEDURE — A9585 GADOBUTROL INJECTION: HCPCS | Performed by: PODIATRIST

## 2022-04-11 PROCEDURE — 25500020 PHARM REV CODE 255: Performed by: PODIATRIST

## 2022-04-11 RX ORDER — GADOBUTROL 604.72 MG/ML
8 INJECTION INTRAVENOUS
Status: COMPLETED | OUTPATIENT
Start: 2022-04-11 | End: 2022-04-11

## 2022-04-11 RX ORDER — EVOLOCUMAB 140 MG/ML
140 INJECTION, SOLUTION SUBCUTANEOUS
Qty: 2 ML | Refills: 6 | OUTPATIENT
Start: 2022-04-11 | End: 2022-05-09

## 2022-04-11 RX ADMIN — GADOBUTROL 8 ML: 604.72 INJECTION INTRAVENOUS at 07:04

## 2022-04-14 ENCOUNTER — OFFICE VISIT (OUTPATIENT)
Dept: PODIATRY | Facility: CLINIC | Age: 62
End: 2022-04-14
Payer: COMMERCIAL

## 2022-04-14 VITALS
WEIGHT: 156.5 LBS | BODY MASS INDEX: 26.87 KG/M2 | SYSTOLIC BLOOD PRESSURE: 126 MMHG | HEART RATE: 81 BPM | DIASTOLIC BLOOD PRESSURE: 63 MMHG

## 2022-04-14 DIAGNOSIS — E11.9 CONTROLLED TYPE 2 DIABETES MELLITUS WITHOUT COMPLICATION, WITH LONG-TERM CURRENT USE OF INSULIN: Primary | ICD-10-CM

## 2022-04-14 DIAGNOSIS — B07.0 PLANTAR WART, LEFT FOOT: ICD-10-CM

## 2022-04-14 DIAGNOSIS — Z79.4 CONTROLLED TYPE 2 DIABETES MELLITUS WITHOUT COMPLICATION, WITH LONG-TERM CURRENT USE OF INSULIN: Primary | ICD-10-CM

## 2022-04-14 PROCEDURE — 3078F PR MOST RECENT DIASTOLIC BLOOD PRESSURE < 80 MM HG: ICD-10-PCS | Mod: CPTII,S$GLB,, | Performed by: PODIATRIST

## 2022-04-14 PROCEDURE — 3008F BODY MASS INDEX DOCD: CPT | Mod: CPTII,S$GLB,, | Performed by: PODIATRIST

## 2022-04-14 PROCEDURE — 4010F ACE/ARB THERAPY RXD/TAKEN: CPT | Mod: CPTII,S$GLB,, | Performed by: PODIATRIST

## 2022-04-14 PROCEDURE — 3051F PR MOST RECENT HEMOGLOBIN A1C LEVEL 7.0 - < 8.0%: ICD-10-PCS | Mod: CPTII,S$GLB,, | Performed by: PODIATRIST

## 2022-04-14 PROCEDURE — 99213 PR OFFICE/OUTPT VISIT, EST, LEVL III, 20-29 MIN: ICD-10-PCS | Mod: S$GLB,,, | Performed by: PODIATRIST

## 2022-04-14 PROCEDURE — 3008F PR BODY MASS INDEX (BMI) DOCUMENTED: ICD-10-PCS | Mod: CPTII,S$GLB,, | Performed by: PODIATRIST

## 2022-04-14 PROCEDURE — 99999 PR PBB SHADOW E&M-EST. PATIENT-LVL IV: CPT | Mod: PBBFAC,,, | Performed by: PODIATRIST

## 2022-04-14 PROCEDURE — 1160F RVW MEDS BY RX/DR IN RCRD: CPT | Mod: CPTII,S$GLB,, | Performed by: PODIATRIST

## 2022-04-14 PROCEDURE — 1160F PR REVIEW ALL MEDS BY PRESCRIBER/CLIN PHARMACIST DOCUMENTED: ICD-10-PCS | Mod: CPTII,S$GLB,, | Performed by: PODIATRIST

## 2022-04-14 PROCEDURE — 3074F SYST BP LT 130 MM HG: CPT | Mod: CPTII,S$GLB,, | Performed by: PODIATRIST

## 2022-04-14 PROCEDURE — 1159F PR MEDICATION LIST DOCUMENTED IN MEDICAL RECORD: ICD-10-PCS | Mod: CPTII,S$GLB,, | Performed by: PODIATRIST

## 2022-04-14 PROCEDURE — 3074F PR MOST RECENT SYSTOLIC BLOOD PRESSURE < 130 MM HG: ICD-10-PCS | Mod: CPTII,S$GLB,, | Performed by: PODIATRIST

## 2022-04-14 PROCEDURE — 99213 OFFICE O/P EST LOW 20 MIN: CPT | Mod: S$GLB,,, | Performed by: PODIATRIST

## 2022-04-14 PROCEDURE — 3072F LOW RISK FOR RETINOPATHY: CPT | Mod: CPTII,S$GLB,, | Performed by: PODIATRIST

## 2022-04-14 PROCEDURE — 99999 PR PBB SHADOW E&M-EST. PATIENT-LVL IV: ICD-10-PCS | Mod: PBBFAC,,, | Performed by: PODIATRIST

## 2022-04-14 PROCEDURE — 3078F DIAST BP <80 MM HG: CPT | Mod: CPTII,S$GLB,, | Performed by: PODIATRIST

## 2022-04-14 PROCEDURE — 4010F PR ACE/ARB THEARPY RXD/TAKEN: ICD-10-PCS | Mod: CPTII,S$GLB,, | Performed by: PODIATRIST

## 2022-04-14 PROCEDURE — 1159F MED LIST DOCD IN RCRD: CPT | Mod: CPTII,S$GLB,, | Performed by: PODIATRIST

## 2022-04-14 PROCEDURE — 3072F PR LOW RISK FOR RETINOPATHY: ICD-10-PCS | Mod: CPTII,S$GLB,, | Performed by: PODIATRIST

## 2022-04-14 PROCEDURE — 3051F HG A1C>EQUAL 7.0%<8.0%: CPT | Mod: CPTII,S$GLB,, | Performed by: PODIATRIST

## 2022-04-14 NOTE — PROGRESS NOTES
Subjective:      Patient ID: Mariann Huff is a 62 y.o. female.    Chief Complaint: Foot Pain (Left foot pain. MRI results)    Mariann is a 62 y.o. female who presents to the podiatry clinic  with complaint of  left foot skin lesion on the heel. Onset of the symptoms was several weeks ago. Precipitating event: none known. Current symptoms include: hard area of skin with moderate pain on palpation and pressure. Aggravating factors: any weight bearing and direct pressure. Symptoms have progressed to a point and plateaued. Patient has had prior foot problems. Evaluation to date: none. Treatment to date: none. Patients rates pain 0/10 on pain scale currently but 4-5/10 at worst. Was told by PCP that it may be a wound and she should have it evaluated by Podiatry since she is diabetic.     03/16/2022: follow up for L heel skin lesion. States previous trimming helped for a few days but her pain returned and the hard skin returned. Has pain with direct touch and would like to have it assessed for further recommendations.     CC#2: R foot pain. Denies injury but has moderate 6/10 pain with walking and direct touch with shoes to the outside of midfoot. No swelling but does feel a small knot there. Would like to have this assessed.     03/30/2022: follow up for L heel skin lesion. Still bothering her with pain. Using OTC salicylic acid on it daily for 2 weeks, stopped using 2 days ago. Here for further recommendations. R foot no longer bothering her.     04/14/2022: follow up for L heel skin lesion. Still bothering her but feels a little better with salicylic acid and after trimming but not much. Had mri done which did not show any abnormalities under level of skin. It did  incidental PTTD and achilles tendinitis (not bothering her). Here for further recommendations.       Chief Complaint   Patient presents with    Foot Pain     Left foot pain. MRI results       Vitals:    04/14/22 1428   BP: 126/63   Pulse: 81    Weight: 71 kg (156 lb 8.4 oz)   PainSc:   4   PainLoc: Foot         Review of Systems   Constitutional: Negative for chills, fever and malaise/fatigue.   HENT: Negative for hearing loss.    Cardiovascular: Negative for claudication.   Respiratory: Negative for shortness of breath.    Skin: Positive for suspicious lesions. Negative for color change, flushing, nail changes, rash and unusual hair distribution.   Musculoskeletal: Negative for joint pain and myalgias.        Heel pain L, skin lesion    Neurological: Positive for paresthesias. Negative for loss of balance, numbness and sensory change.   Psychiatric/Behavioral: Negative for altered mental status.           Objective:      Physical Exam  Vitals reviewed.   Constitutional:       Appearance: She is well-developed.   Cardiovascular:      Pulses:           Dorsalis pedis pulses are 1+ on the right side and 1+ on the left side.        Posterior tibial pulses are 2+ on the right side and 2+ on the left side.      Comments:    Musculoskeletal:      Right lower le+ Edema present.      Left lower le+ Edema present.      Right ankle: Decreased range of motion. Abnormal pulse.      Right Achilles Tendon: Nick's test negative.      Left ankle: Decreased range of motion. Abnormal pulse.      Left Achilles Tendon: Nick's test negative.      Right foot: Decreased range of motion.      Left foot: Decreased range of motion.      Comments: Pain on palpation of lateral plantar L heel at location of skin lesion, see skin section. Pain with palpation of R 5th met base at location of PB insertion. Small palpable area of induration measuring 0.5cm at base of 5th metatarsal with pain on palpation and manipulation.  Otherwise rectus foot and toe position bilateral with no major deformities noted. Mild equinus noted b/l ankles with < 10 deg DF noted. MMT 5/5 in DF/PF/Inv/Ev resistance with ++ reproduction of pain in DF/Ev resistance R foot only. Passive range of  motion of ankle and pedal joints is painless b/l.       Feet:      Right foot:      Protective Sensation: 5 sites tested. 2 sites sensed.      Left foot:      Protective Sensation: 5 sites tested. 2 sites sensed.   Skin:     General: Skin is dry.      Capillary Refill: Capillary refill takes more than 3 seconds.      Comments: 1-1.5 cm hyperkeratotic skin lesion to lateral L heel with hyperkeratotic tissue overlying a healed fragile, thin epithelium. No current open wound, erythema, drainage or SOI. Mild spongy white central core. No embedded capillaries noted. No underlying tissue damage. No pinpoint bleeding to lesion. Overall skin is thin, shiny, atrophic. Mild hyperpigmentation to skin of both ankles and/or feet, consistent with hemosiderin deposits. Loss of pedal hair b/l.    Neurological:      Mental Status: She is alert.      Sensory: Sensory deficit present.      Comments: diminished sensation noted to toes of b/L lower extremities   Psychiatric:         Behavior: Behavior normal. Behavior is cooperative.       Pre and post debridement                 Assessment:       Encounter Diagnoses   Name Primary?    Controlled type 2 diabetes mellitus without complication, with long-term current use of insulin Yes    Plantar wart, left foot          Plan:       Mariann was seen today for foot pain.    Diagnoses and all orders for this visit:    Controlled type 2 diabetes mellitus without complication, with long-term current use of insulin    Plantar wart, left foot      I counseled the patient on her conditions, their implications and medical management.    - Shoe inspection. Diabetic Foot Education. Patient reminded of the importance of good nutrition and blood sugar control to help prevent podiatric complications of diabetes. Patient instructed on proper foot hygeine. We discussed wearing proper shoe gear, daily foot inspections, never walking without protective shoe gear, caution putting sharp instruments to  feet     - Discussed DM foot care:  Wear comfortable, proper fitting shoes. Wash feet daily. Dry well. After drying, apply moisturizer to feet (no lotion to webspaces). Inspect feet daily for skin breaks, blisters, swelling, or redness. Wear cotton socks (preferably white)  Change socks every day. Do NOT walk barefoot. Do NOT use heating pads or warm/hot water soaks     For L heel plantar wart/skin lesion:   The affected area was cleansed with an alcohol prep pad. Next, utilizing a 5mm curette, the hyperkeratotic tissues were trimmed from plantar lateral L heel, down to appropriate level of skin. Care was taken to remove any nucleated core from the center of the lesion. No pinpoint bleeding was encountered. The patient tolerated relief following this procedure. This was done as courtesy today.     Recommended continue OTC salicylic acid daily for 1 week at a time with 1 week breaks. Use moisturizing lotion during 1 week break from salicylic acid.     MRI reviewed noting no abnormalities under skin lesion.     Incidental findings of PTTD and Achilles tendinitis currently asymptomatic. Advised to monitor for any problems with this.      Consider Cantharone once available.     Consider biopsy if no response to cantharone.     Consider excision as last option if no resolution with conservative care    RTC 4-6 weeks, sooner PRN

## 2022-04-17 RX ORDER — ESZOPICLONE 2 MG/1
2 TABLET, FILM COATED ORAL NIGHTLY
Qty: 30 TABLET | Refills: 0 | Status: SHIPPED | OUTPATIENT
Start: 2022-04-17 | End: 2022-05-17 | Stop reason: SDUPTHER

## 2022-04-17 NOTE — TELEPHONE ENCOUNTER
checked.  No suspicious activity noted.  Refill done.    Patient due for turn to clinic in May.  Refill done.

## 2022-04-18 ENCOUNTER — PATIENT MESSAGE (OUTPATIENT)
Dept: ADMINISTRATIVE | Facility: OTHER | Age: 62
End: 2022-04-18
Payer: COMMERCIAL

## 2022-04-18 RX ORDER — EVOLOCUMAB 140 MG/ML
140 INJECTION, SOLUTION SUBCUTANEOUS
Qty: 2 ML | Refills: 6 | Status: SHIPPED | OUTPATIENT
Start: 2022-04-18 | End: 2022-12-15 | Stop reason: SDUPTHER

## 2022-04-21 ENCOUNTER — SPECIALTY PHARMACY (OUTPATIENT)
Dept: PHARMACY | Facility: CLINIC | Age: 62
End: 2022-04-21
Payer: COMMERCIAL

## 2022-05-02 ENCOUNTER — HOSPITAL ENCOUNTER (OUTPATIENT)
Dept: CARDIOLOGY | Facility: HOSPITAL | Age: 62
Discharge: HOME | End: 2022-05-02
Attending: INTERNAL MEDICINE
Payer: COMMERCIAL

## 2022-05-02 DIAGNOSIS — I73.9 PAD (PERIPHERAL ARTERY DISEASE): ICD-10-CM

## 2022-05-02 DIAGNOSIS — I65.23 CAROTID STENOSIS, BILATERAL: ICD-10-CM

## 2022-05-02 LAB
LEFT ABI: 0.67
LEFT ARM BP: 171 MMHG
LEFT ARM DIASTOLIC BLOOD PRESSURE: 80 MMHG
LEFT ARM SYSTOLIC BLOOD PRESSURE: 171 MMHG
LEFT CALF BP: 141 MMHG
LEFT CBA DIAS: 24 CM/S
LEFT CBA SYS: 128 CM/S
LEFT CCA DIST DIAS: 27 CM/S
LEFT CCA DIST SYS: 112 CM/S
LEFT CCA MID DIAS: 28 CM/S
LEFT CCA MID SYS: 132 CM/S
LEFT CCA PROX DIAS: 31 CM/S
LEFT CCA PROX SYS: 129 CM/S
LEFT DORSALIS PEDIS: 118 MMHG
LEFT ECA DIAS: 20 CM/S
LEFT ECA SYS: 159 CM/S
LEFT ICA DIST DIAS: 20 CM/S
LEFT ICA DIST SYS: 102 CM/S
LEFT ICA MID DIAS: 32 CM/S
LEFT ICA MID SYS: 120 CM/S
LEFT ICA PROX DIAS: 15 CM/S
LEFT ICA PROX SYS: 90 CM/S
LEFT LOWER LEG BP: 254 MMHG
LEFT TBI: 0.48
LEFT TOE PRESSURE: 85 MMHG
LEFT UPPER LEG BP: 254 MMHG
LEFT VERTEBRAL DIAS: 18 CM/S
LEFT VERTEBRAL SYS: 71 CM/S
OHS CV CAROTID RIGHT ICA EDV HIGHEST: 29
OHS CV CAROTID ULTRASOUND LEFT ICA/CCA RATIO: 1.07
OHS CV CAROTID ULTRASOUND RIGHT ICA/CCA RATIO: 1.34
OHS CV PV CAROTID LEFT HIGHEST CCA: 132
OHS CV PV CAROTID LEFT HIGHEST ICA: 120
OHS CV PV CAROTID RIGHT HIGHEST CCA: 99
OHS CV PV CAROTID RIGHT HIGHEST ICA: 133
OHS CV US CAROTID LEFT HIGHEST EDV: 32
RIGHT ABI: 0.86
RIGHT ARM BP: 177 MMHG
RIGHT ARM DIASTOLIC BLOOD PRESSURE: 80 MMHG
RIGHT ARM SYSTOLIC BLOOD PRESSURE: 177 MMHG
RIGHT CALF BP: 149 MMHG
RIGHT CBA DIAS: 22 CM/S
RIGHT CBA SYS: 123 CM/S
RIGHT CCA DIST DIAS: 17 CM/S
RIGHT CCA DIST SYS: 99 CM/S
RIGHT CCA MID DIAS: 17 CM/S
RIGHT CCA MID SYS: 96 CM/S
RIGHT CCA PROX DIAS: 16 CM/S
RIGHT CCA PROX SYS: 79 CM/S
RIGHT DORSALIS PEDIS: 152 MMHG
RIGHT ECA DIAS: 24 CM/S
RIGHT ECA SYS: 155 CM/S
RIGHT ICA DIST DIAS: 29 CM/S
RIGHT ICA DIST SYS: 94 CM/S
RIGHT ICA MID DIAS: 24 CM/S
RIGHT ICA MID SYS: 109 CM/S
RIGHT ICA PROX DIAS: 29 CM/S
RIGHT ICA PROX SYS: 133 CM/S
RIGHT LOWER LEG BP: 254 MMHG
RIGHT POSTERIOR TIBIAL: 141 MMHG
RIGHT TBI: 0.59
RIGHT TOE PRESSURE: 105 MMHG
RIGHT UPPER LEG BP: 254 MMHG
RIGHT VERTEBRAL DIAS: 9 CM/S
RIGHT VERTEBRAL SYS: 33 CM/S

## 2022-05-02 PROCEDURE — 93880 EXTRACRANIAL BILAT STUDY: CPT

## 2022-05-02 PROCEDURE — 93880 EXTRACRANIAL BILAT STUDY: CPT | Mod: 26,,, | Performed by: INTERNAL MEDICINE

## 2022-05-02 PROCEDURE — 93880 CV US DOPPLER CAROTID (CUPID ONLY): ICD-10-PCS | Mod: 26,,, | Performed by: INTERNAL MEDICINE

## 2022-05-02 PROCEDURE — 93923 UPR/LXTR ART STDY 3+ LVLS: CPT | Mod: 50

## 2022-05-02 PROCEDURE — 93923 SEGMENTAL PRESSURE LOWER EXTREMITY: ICD-10-PCS | Mod: 26,,, | Performed by: INTERNAL MEDICINE

## 2022-05-02 PROCEDURE — 93923 UPR/LXTR ART STDY 3+ LVLS: CPT | Mod: 26,,, | Performed by: INTERNAL MEDICINE

## 2022-05-03 ENCOUNTER — TELEPHONE (OUTPATIENT)
Dept: CARDIOLOGY | Facility: CLINIC | Age: 62
End: 2022-05-03
Payer: COMMERCIAL

## 2022-05-03 NOTE — TELEPHONE ENCOUNTER
----- Message from Adelaide Aguilar MD sent at 5/3/2022  9:21 AM CDT -----  Please inform the patient that she has moderate bilateral carotid stenosis.  For now she should be treated medically and she is on a good medical therapy.

## 2022-05-03 NOTE — PROGRESS NOTES
"Please inform the patient that she has moderate PAD, which could be contributing to her legs feeling "tired" while walking.  I would like to refer her to Dr. Fry and address possible treatment with low dose Xarelto. YG"

## 2022-05-03 NOTE — TELEPHONE ENCOUNTER
"----- Message from Adelaide Aguilar MD sent at 5/3/2022  9:26 AM CDT -----  Please inform the patient that she has moderate PAD, which could be contributing to her legs feeling "tired" while walking.  I would like to refer her to Dr. Fry and address possible treatment with low dose Xarelto. YG  "

## 2022-05-03 NOTE — PROGRESS NOTES
Please inform the patient that she has moderate bilateral carotid stenosis.  For now she should be treated medically and she is on a good medical therapy.

## 2022-05-04 ENCOUNTER — OFFICE VISIT (OUTPATIENT)
Dept: CARDIOLOGY | Facility: CLINIC | Age: 62
End: 2022-05-04
Payer: COMMERCIAL

## 2022-05-04 VITALS
WEIGHT: 157.44 LBS | HEIGHT: 64 IN | SYSTOLIC BLOOD PRESSURE: 122 MMHG | BODY MASS INDEX: 26.88 KG/M2 | DIASTOLIC BLOOD PRESSURE: 65 MMHG | HEART RATE: 76 BPM

## 2022-05-04 DIAGNOSIS — Z74.09 IMPAIRED FUNCTIONAL MOBILITY AND ENDURANCE: ICD-10-CM

## 2022-05-04 DIAGNOSIS — R29.818 NEUROGENIC CLAUDICATION: Chronic | ICD-10-CM

## 2022-05-04 DIAGNOSIS — E11.649 UNCONTROLLED TYPE 2 DIABETES MELLITUS WITH HYPOGLYCEMIA WITHOUT COMA: Chronic | ICD-10-CM

## 2022-05-04 DIAGNOSIS — Z95.5 S/P CORONARY ARTERY STENT PLACEMENT: ICD-10-CM

## 2022-05-04 DIAGNOSIS — I65.23 CAROTID STENOSIS, BILATERAL: Chronic | ICD-10-CM

## 2022-05-04 DIAGNOSIS — I25.10 CORONARY ARTERY DISEASE INVOLVING NATIVE CORONARY ARTERY OF NATIVE HEART WITHOUT ANGINA PECTORIS: Chronic | ICD-10-CM

## 2022-05-04 DIAGNOSIS — G89.29 OTHER CHRONIC PAIN: ICD-10-CM

## 2022-05-04 DIAGNOSIS — M54.16 LUMBAR RADICULOPATHY: ICD-10-CM

## 2022-05-04 DIAGNOSIS — I15.2 HYPERTENSION ASSOCIATED WITH DIABETES: Chronic | ICD-10-CM

## 2022-05-04 DIAGNOSIS — E11.69 HYPERLIPIDEMIA ASSOCIATED WITH TYPE 2 DIABETES MELLITUS: Chronic | ICD-10-CM

## 2022-05-04 DIAGNOSIS — I73.9 PAD (PERIPHERAL ARTERY DISEASE): Primary | ICD-10-CM

## 2022-05-04 DIAGNOSIS — I50.32 CHRONIC HEART FAILURE WITH PRESERVED EJECTION FRACTION: Chronic | ICD-10-CM

## 2022-05-04 DIAGNOSIS — E78.5 HYPERLIPIDEMIA ASSOCIATED WITH TYPE 2 DIABETES MELLITUS: Chronic | ICD-10-CM

## 2022-05-04 DIAGNOSIS — I25.2 HISTORY OF NON-ST ELEVATION MYOCARDIAL INFARCTION (NSTEMI): Chronic | ICD-10-CM

## 2022-05-04 DIAGNOSIS — M47.816 ARTHRITIS OF LUMBAR SPINE: Chronic | ICD-10-CM

## 2022-05-04 DIAGNOSIS — M54.17 LUMBOSACRAL RADICULOPATHY AT L5: ICD-10-CM

## 2022-05-04 DIAGNOSIS — I73.9 CLAUDICATION OF BOTH LOWER EXTREMITIES: ICD-10-CM

## 2022-05-04 DIAGNOSIS — E11.59 HYPERTENSION ASSOCIATED WITH DIABETES: Chronic | ICD-10-CM

## 2022-05-04 PROCEDURE — 1159F PR MEDICATION LIST DOCUMENTED IN MEDICAL RECORD: ICD-10-PCS | Mod: CPTII,S$GLB,, | Performed by: INTERNAL MEDICINE

## 2022-05-04 PROCEDURE — 99999 PR PBB SHADOW E&M-EST. PATIENT-LVL V: CPT | Mod: PBBFAC,,, | Performed by: INTERNAL MEDICINE

## 2022-05-04 PROCEDURE — 1159F MED LIST DOCD IN RCRD: CPT | Mod: CPTII,S$GLB,, | Performed by: INTERNAL MEDICINE

## 2022-05-04 PROCEDURE — 99205 PR OFFICE/OUTPT VISIT, NEW, LEVL V, 60-74 MIN: ICD-10-PCS | Mod: S$GLB,,, | Performed by: INTERNAL MEDICINE

## 2022-05-04 PROCEDURE — 3051F PR MOST RECENT HEMOGLOBIN A1C LEVEL 7.0 - < 8.0%: ICD-10-PCS | Mod: CPTII,S$GLB,, | Performed by: INTERNAL MEDICINE

## 2022-05-04 PROCEDURE — 3008F BODY MASS INDEX DOCD: CPT | Mod: CPTII,S$GLB,, | Performed by: INTERNAL MEDICINE

## 2022-05-04 PROCEDURE — 3051F HG A1C>EQUAL 7.0%<8.0%: CPT | Mod: CPTII,S$GLB,, | Performed by: INTERNAL MEDICINE

## 2022-05-04 PROCEDURE — 3072F LOW RISK FOR RETINOPATHY: CPT | Mod: CPTII,S$GLB,, | Performed by: INTERNAL MEDICINE

## 2022-05-04 PROCEDURE — 3078F DIAST BP <80 MM HG: CPT | Mod: CPTII,S$GLB,, | Performed by: INTERNAL MEDICINE

## 2022-05-04 PROCEDURE — 99205 OFFICE O/P NEW HI 60 MIN: CPT | Mod: S$GLB,,, | Performed by: INTERNAL MEDICINE

## 2022-05-04 PROCEDURE — 99999 PR PBB SHADOW E&M-EST. PATIENT-LVL V: ICD-10-PCS | Mod: PBBFAC,,, | Performed by: INTERNAL MEDICINE

## 2022-05-04 PROCEDURE — 4010F PR ACE/ARB THEARPY RXD/TAKEN: ICD-10-PCS | Mod: CPTII,S$GLB,, | Performed by: INTERNAL MEDICINE

## 2022-05-04 PROCEDURE — 3074F PR MOST RECENT SYSTOLIC BLOOD PRESSURE < 130 MM HG: ICD-10-PCS | Mod: CPTII,S$GLB,, | Performed by: INTERNAL MEDICINE

## 2022-05-04 PROCEDURE — 3072F PR LOW RISK FOR RETINOPATHY: ICD-10-PCS | Mod: CPTII,S$GLB,, | Performed by: INTERNAL MEDICINE

## 2022-05-04 PROCEDURE — 3008F PR BODY MASS INDEX (BMI) DOCUMENTED: ICD-10-PCS | Mod: CPTII,S$GLB,, | Performed by: INTERNAL MEDICINE

## 2022-05-04 PROCEDURE — 3074F SYST BP LT 130 MM HG: CPT | Mod: CPTII,S$GLB,, | Performed by: INTERNAL MEDICINE

## 2022-05-04 PROCEDURE — 4010F ACE/ARB THERAPY RXD/TAKEN: CPT | Mod: CPTII,S$GLB,, | Performed by: INTERNAL MEDICINE

## 2022-05-04 PROCEDURE — 3078F PR MOST RECENT DIASTOLIC BLOOD PRESSURE < 80 MM HG: ICD-10-PCS | Mod: CPTII,S$GLB,, | Performed by: INTERNAL MEDICINE

## 2022-05-04 RX ORDER — CILOSTAZOL 100 MG/1
100 TABLET ORAL 2 TIMES DAILY
Qty: 60 TABLET | Refills: 11 | Status: ON HOLD | OUTPATIENT
Start: 2022-05-04 | End: 2023-08-07 | Stop reason: HOSPADM

## 2022-05-04 NOTE — PROGRESS NOTES
[facility]      Chief Complaint   Patient presents with    Peripheral Arterial Disease       Patient ID: Mariann Huff is a 62 y.o. female with CAD s/p stents, HTN, HLD, DM2, MURTAZA (not on CPAP), who presents for an initial appointment.  Pertinent history/events are as follows:     -Pt kindly referred by Dr. Aguilar for evaluation of PAD/bilateral carotid stenosis.     HPI:  Mrs. Huff reports pain in both calves after walking 3 blocks, which improves with rest.  No rest pain or tissue loss.  She also reports chronic back pain.  Segmental Pressure Study on 5/2/2022 revealed right resting NIKKI 0.86, suggestive of mild right lower extremity arterial disease.  Left resting NIKKI 0.67, suggestive of moderate left lower extremity arterial disease.  PVR waveforms are mildly dampened at the ankle level bilaterally, and at the digit level on the left.  Segmental pressures are uninterpretable due to noncompressible thigh pressures.  Carotid Ultrasound on 5/2/2022 revealed less than 50% right and left Internal Carotid Stenosis.  There is less than 50% left Internal Carotid Stenosis.    Past Medical History:   Diagnosis Date    Anticoagulant long-term use     Asthma     Back pain     Bradycardia, unspecified 5/8/2019    The etiology of the bradycardic episode is unclear.  The have appear to be respiratory in origin (at least the 1st episode).  We will continue to monitor carefully.  We are awaiting evaluation by Cardiology.     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, with long-term current use of insulin 2020     Past Surgical History:   Procedure Laterality Date    ANTEGRADE NEPHROSTOGRAPHY Left 2019    Procedure: Nephrostogram - antegrade;  Surgeon: Robin Boyd MD;  [facility];  Service: Urology;  Laterality: Left;    BRONCHOSCOPY N/A 2019    Procedure: BRONCHOSCOPY;  Surgeon: Sean Ruano MD;  [address];  Service: General;  Laterality: N/A;    CARDIAC CATHETERIZATION      CATARACT EXTRACTION      cataract extraction left eye      cataracts      CAUDAL EPIDURAL STEROID INJECTION N/A 2019    Procedure: Injection-steroid-epidural-caudal;  Surgeon: Dave Bentley Jr., MD;  Location: [facility] PAIN MGT;  Service: Pain Management;  Laterality: N/A;     SECTION, LOW TRANSVERSE      COLONOSCOPY N/A 2019    Procedure: COLONOSCOPY;  Surgeon: Al Alaniz MD;  [facility]  Service: Endoscopy;  Laterality: N/A;    CORONARY ANGIOPLASTY      CYSTOGRAM N/A 2019    Procedure: CYSTOGRAM INTRAOP;  Surgeon: oRbin Boyd MD;  [facility];  Service: Urology;  Laterality: N/A;  1 HOUR    CYSTOSCOPY W/ RETROGRADES Left 2019    Procedure: CYSTOSCOPY, WITH RETROGRADE PYELOGRAM;  Surgeon: Robin Boyd MD;  [facility];  Service: Urology;  Laterality: Left;  fluro    ESOPHAGOGASTRODUODENOSCOPY W/ PEG  2019    Procedure: EGD, WITH PEG TUBE INSERTION;  Surgeon: Sean Ruano MD;  [facility];  Service: General;;    EXCISION TURBINATE, SUBMUCOUS      FLEXIBLE SIGMOIDOSCOPY N/A 2019    Procedure: SIGMOIDOSCOPY, FLEXIBLE;  Surgeon: ALBERTO Amin MD;  Location: [facility]  Service: Endoscopy;  Laterality: N/A;    FLEXIBLE SIGMOIDOSCOPY N/A 2019    Procedure: SIGMOIDOSCOPY,  FLEXIBLE;  Surgeon: ALBERTO Amin MD;  Location: [facility] ENDO (2ND FLR);  Service: Endoscopy;  Laterality: N/A;    FUSION OF LUMBAR SPINE BY ANTERIOR APPROACH Left 4/12/2019    Procedure: FUSION, SPINE, LUMBAR, ANTERIOR APPROACH Left L5-S1 Anterior to Psoa Interbody Fusion, L5-S1 Posterior Instrumentation;  Surgeon: Mk George MD;  Location: [facility] OR 2ND FLR;  Service: Neurosurgery;  Laterality: Left;  Porcedure:  Left L5-S1 Anterior to Psoa Interbody Fusion, L5-S1 Posterior Instrumentation  Surgery Time: 4 Hrs  LOS: 2-3  Anesthesia: General   Blood: Type & Screen  R    HAND SURGERY Left     HAND SURGERY Right     torn ligament repair/ Dr. Yeboah    HYSTERECTOMY      INJECTION OF STEROID Right 12/10/2020    Procedure: INJECTION, STEROID Right SI Joint Block and Steroid Injection;  Surgeon: Mk George MD;  Location: [facility];  Service: Neurosurgery;  Laterality: Right;  Procedure: Right SI Joint Block and Steroid Injection   SUrgery Time: 30 Min  LOS: 0  Anesthesia: MAC  Radiology: C-arm  Bed: [employer] 4 Poster  Position: Prone    INJECTION OF STEROID Right 9/28/2021    Procedure: INJECTION, STEROID Right SI joint block & steroid injection;  Surgeon: Mk George MD;  Location: [facility];  Service: Neurosurgery;  Laterality: Right;  Procedure: Right SI joint block & steroid injection  Surgery Time: 30m  Anesthesia: Local MAC  Radiology: C-arm  Bed: Regular  Position: Prone    left foot surgery      left wrist surgery      LYSIS OF ADHESIONS N/A 2/19/2020    Procedure: LYSIS, ADHESIONS;  Surgeon: Robin Boyd MD;  Location: [facility] OR 2ND FLR;  Service: Urology;  Laterality: N/A;    NASAL SEPTUM SURGERY  5/7/15    PERCUTANEOUS NEPHROSTOMY Left 4/21/2019    Procedure: Creation, Nephrostomy, Percutaneous;  Surgeon: Karina Surgeon;  Location: [facility];  Service: Anesthesiology;  Laterality: Left;    REPAIR OF URETER  4/12/2019    Procedure: REPAIR, URETER;  Surgeon: Mk George MD;  Location:  [facility] OR 2ND FLR;  Service: Neurosurgery;;    REPLACEMENT OF NEPHROSTOMY TUBE N/A 7/18/2019    Procedure: REPLACEMENT, NEPHROSTOMY TUBE;  Surgeon: [facility] Diagnostic Provider;  Location: [facility] OR 2ND FLR;  Service: Anesthesiology;  Laterality: N/A;  188    REPLACEMENT OF NEPHROSTOMY TUBE N/A 7/24/2019    Procedure: REPLACEMENT, NEPHROSTOMY TUBE;  Surgeon: [facility] Diagnostic Provider;  Location: [facility] OR [facility] FLR;  Service: Anesthesiology;  Laterality: N/A;  188    REPLACEMENT OF NEPHROSTOMY TUBE N/A 10/7/2019    Procedure: REPLACEMENT, NEPHROSTOMY TUBE;  Surgeon: [facility] Diagnostic Provider;  Location: [facility] OR 2ND FLR;  Service: Anesthesiology;  Laterality: N/A;  189    REPLACEMENT OF NEPHROSTOMY TUBE N/A 11/25/2019    Procedure: REPLACEMENT, NEPHROSTOMY TUBE;  Surgeon: [facility] Diagnostic Provider;  [facility];  Service: Anesthesiology;  Laterality: N/A;  Room 188/Bessy    REPLACEMENT OF NEPHROSTOMY TUBE Right 2/19/2020    Procedure: REPLACEMENT, NEPHROSTOMY TUBE removal removal;  Surgeon: Robin Boyd MD;  [facility];  Service: Urology;  Laterality: Right;    rt elbow surgery      S/P LAD COATED STENT  05/14/2010    6 total     S/P OM1 STENT  08/2003    SINUS SURGERY      F.E.S.S.    TRACHEOSTOMY N/A 5/2/2019    Procedure: CREATION, TRACHEOSTOMY;  Surgeon: Sean Ruano MD;  [facility];  Service: General;  Laterality: N/A;    TUBAL LIGATION      URETERAL REIMPLANTION Left 2/19/2020    Procedure: REIMPLANTATION, URETER WITH PSOAS HITCH;  Surgeon: Robin Boyd MD;  [facility];  Service: Urology;  Laterality: Left;     Social History     Socioeconomic History    Marital status:      Spouse name: Shamir    Number of children: 2   Occupational History    Occupation: cafeteria     Employer: [employer]     Employer: [employer]     Employer: [employer]   Tobacco Use    Smoking status: Never Smoker    Smokeless  tobacco: Never Used   Substance and Sexual Activity    Alcohol use: No     Alcohol/week: 0.0 standard drinks    Drug use: No    Sexual activity: Yes     Partners: Male     Birth control/protection: Post-menopausal     Comment:    Other Topics Concern    Are you pregnant or think you may be? No    Breast-feeding No   Social History Narrative    . 2 children.      Social Determinants of Health     Physical Activity: Unknown    Days of Exercise per Week: 1 day   Stress: Stress Concern Present    Feeling of Stress : Rather much   Social Connections: Unknown    Frequency of Communication with Friends and Family: Three times a week    Frequency of Social Gatherings with Friends and Family: Once a week    Active Member of Clubs or Organizations: No    Attends Club or Organization Meetings: Patient refused    Marital Status:      Family History   Problem Relation Age of Onset    Diabetes Mother     Heart disease Mother     Diabetes Father     Leukemia Father         leukemia    Heart attack Father     Diabetes Sister     Diabetes Brother     Diabetes Sister     No Known Problems Sister     No Known Problems Brother     No Known Problems Brother     No Known Problems Maternal Grandmother     No Known Problems Maternal Grandfather     No Known Problems Paternal Grandmother     No Known Problems Paternal Grandfather     No Known Problems Son     No Known Problems Son     No Known Problems Maternal Aunt     No Known Problems Maternal Uncle     No Known Problems Paternal Aunt     No Known Problems Paternal Uncle     Colon cancer Neg Hx     Inflammatory bowel disease Neg Hx     Melanoma Neg Hx     Psoriasis Neg Hx     Lupus Neg Hx     Eczema Neg Hx     Acne Neg Hx     Amblyopia Neg Hx     Blindness Neg Hx     Cancer Neg Hx     Cataracts Neg Hx     Glaucoma Neg Hx     Hypertension Neg Hx     Macular degeneration Neg Hx     Retinal detachment Neg Hx      Strabismus Neg Hx     Stroke Neg Hx     Thyroid disease Neg Hx     Heart failure Neg Hx     Hyperlipidemia Neg Hx        Review of patient's allergies indicates:   Allergen Reactions    Milk containing products      Causes GI distress       Medication List with Changes/Refills   Current Medications    ACCU-CHEK EDIN PLUS METER MISC        ALBUTEROL (PROVENTIL/VENTOLIN HFA) 90 MCG/ACTUATION INHALER    Inhale 2 puffs into the lungs every 6 (six) hours as needed for Wheezing.    ALBUTEROL-IPRATROPIUM (DUO-NEB) 2.5 MG-0.5 MG/3 ML NEBULIZER SOLUTION    Inhale 3 mLs into the lungs.    AMLODIPINE (NORVASC) 10 MG TABLET    Take 1 tablet (10 mg total) by mouth once daily.    ASPIRIN 81 MG TAB    Take 81 mg by mouth. 1 Tablet Oral Every day    ATORVASTATIN (LIPITOR) 40 MG TABLET    Take 1 tablet (40 mg total) by mouth every evening.    BLOOD SUGAR DIAGNOSTIC (ACCU-CHEK EDIN PLUS TEST STRP) STRP    TEST BLOOD SUGARS 4 TIMES DAILY    BLOOD-GLUCOSE METER,CONTINUOUS (DEXCOM G6 ) MISC    1 each by Misc.(Non-Drug; Combo Route) route continuous    BLOOD-GLUCOSE SENSOR (DEXCOM G6 SENSOR) MICHAEL    Change sensor every 10 days    BLOOD-GLUCOSE TRANSMITTER (DEXCOM G6 TRANSMITTER) MICHAEL    Use as directed    DICLOFENAC SODIUM (VOLTAREN) 1 % GEL    Apply 2 g topically 3 (three) times daily. Apply to the area of pain 2-3x per day or night as needed    DULAGLUTIDE (TRULICITY) 4.5 MG/0.5 ML PEN INJECTOR    Inject 4.5 mg into the skin every 7 days.    ESCITALOPRAM OXALATE (LEXAPRO) 10 MG TABLET    Take 1 tablet (10 mg total) by mouth once daily.    ESZOPICLONE (LUNESTA) 2 MG TAB    Take 1 tablet (2 mg total) by mouth every evening.    EVOLOCUMAB (REPATHA SURECLICK) 140 MG/ML PNIJ    Inject 1 mL (140 mg total) into the skin every 14 (fourteen) days.    EVOLOCUMAB (REPATHA SURECLICK) 140 MG/ML PNIJ    Inject 1 mL (140 mg total) into the skin every 14 (fourteen) days.    HYDROCHLOROTHIAZIDE (HYDRODIURIL) 12.5 MG TAB    Take 1 tablet  "(12.5 mg total) by mouth once daily.    ISOSORBIDE MONONITRATE (ISMO,MONOKET) 10 MG TABLET    Take 1 tablet (10 mg total) by mouth once daily.    METOPROLOL TARTRATE (LOPRESSOR) 50 MG TABLET    TAKE 1 TABLET BY MOUTH TWICE A DAY    MULTIVITAMIN (THERAGRAN) PER TABLET    Take 1 tablet by mouth once daily.    PEN NEEDLE, DIABETIC (NOVOTWIST) 32 GAUGE X 1/5" NDLE    Use 1 needle to inject insulin four times daily    POTASSIUM CHLORIDE SA (K-DUR,KLOR-CON) 20 MEQ TABLET    Take 1 tablet (20 mEq total) by mouth 2 (two) times daily.    PROMETHAZINE (PHENERGAN) 12.5 MG TAB    Take 1 tablet (12.5 mg total) by mouth every 8 (eight) hours as needed (nausea).    TELMISARTAN (MICARDIS) 80 MG TAB    Take 1 tablet (80 mg total) by mouth once daily. Stop losartan    VARICELLA-ZOSTER GE-AS01B, PF, (SHINGRIX) 50 MCG/0.5 ML INJECTION    Inject into the muscle.       Review of Systems  Constitution: Denies chills, fever, and sweats.  HENT: Denies headaches or blurry vision.  Cardiovascular: Denies chest pain or irregular heart beat.  Respiratory: Denies cough or shortness of breath.  Gastrointestinal: Denies abdominal pain, nausea, or vomiting.  Musculoskeletal: Denies muscle cramps.  Neurological: Denies dizziness or focal weakness.  Psychiatric/Behavioral: Normal mental status.  Hematologic/Lymphatic: Denies bleeding problem or easy bruising/bleeding.  Skin: Denies rash or suspicious lesions    Physical Examination  /65   Pulse 76   Ht 5' 4" (1.626 m)   Wt 71.4 kg (157 lb 6.5 oz)   LMP  (LMP Unknown)   BMI 27.02 kg/m²     Constitutional: No acute distress, conversant  HEENT: Sclera anicteric, Pupils equal, round and reactive to light, extraocular motions intact, Oropharynx clear  Neck: No JVD, no carotid bruits  Cardiovascular: regular rate and rhythm, no murmur, rubs or gallops, normal S1/S2  Pulmonary: Clear to auscultation bilaterally  Abdominal: Abdomen soft, nontender, nondistended, positive bowel sounds  Extremities: " No lower extremity edema,   Pulses:  Carotid pulses are 2+ on the right side, and 2+ on the left side.  Radial pulses are 2+ on the right side, and 2+ on the left side.   Femoral pulses are 2+ on the right side, and 2+ on the left side.  Popliteal pulses are 2+ on the right side, and 2+ on the left side.   Dorsalis pedis pulses are 2+ on the right side, and 2+ on the left side.   Posterior tibial pulses are 2+ on the right side, and 2+ on the left side.    Skin: No ecchymosis, erythema, or ulcers  Psych: Alert and oriented x 3, appropriate affect  Neuro: CNII-XII intact, no focal deficits    Labs:  Most Recent Data  CBC:   Lab Results   Component Value Date    WBC 6.57 02/25/2022    HGB 11.9 (L) 02/25/2022    HCT 39.1 02/25/2022     02/25/2022    MCV 88 02/25/2022    RDW 13.9 02/25/2022     BMP:   Lab Results   Component Value Date     (H) 03/31/2022    K 4.3 03/31/2022     03/31/2022    CO2 27 03/31/2022    BUN 24 (H) 03/31/2022    CREATININE 1.09 03/31/2022     (H) 03/31/2022    CALCIUM 9.8 03/31/2022    MG 2.0 08/20/2020    PHOS 3.9 07/01/2019     LFTS;   Lab Results   Component Value Date    PROT 8.8 (H) 03/31/2022    ALBUMIN 4.9 03/31/2022    BILITOT 1.0 03/31/2022    AST 43 03/31/2022    ALKPHOS 119 03/31/2022    ALT 20 03/31/2022     COAGS:   Lab Results   Component Value Date    INR 0.9 11/25/2019     FLP:   Lab Results   Component Value Date    CHOL 102 (L) 03/31/2022    HDL 49 03/31/2022    LDLCALC 35.4 (L) 03/31/2022    TRIG 88 03/31/2022    CHOLHDL 48.0 03/31/2022     CARDIAC:   Lab Results   Component Value Date    TROPONINI 3.344 (H) 06/07/2019    BNP 1,403 (H) 06/06/2019       Imaging:    Segmental Pressure Study 5/2/2022:  · Right resting NIKKI 0.86, is suggestive of mild right lower extremity arterial disease.  · Left resting NIKKI 0.67, suggestive of moderate left lower extremity arterial disease.  · PVR waveforms are mildly dampened at the ankle level bilaterally, and at the  digit level on the left.  · Segmental pressures are uninterpretable due to noncompressible thigh pressures.    Carotid Ultrasound:  · There is less than 50% right Internal Carotid Stenosis.  · There is less than 50% left Internal Carotid Stenosis.    Assessment/Plan:  Mariann Huff is a 62 y.o. female with CAD s/p stents, HTN, HLD, DM2, MURTAZA (not on CPAP), who presents for an initial appointment.     1. PAD- Mrs. Huff presents with [place] stage 1-2 claudication symptoms.  She has no rest pain or tissue loss to suggest CLI.  She likely also has lumbar degenerative disc disease, which is contributing to a claudication symptoms.  Check MRI lumbar spine and CTA abd/pelvis with iliofemoral runoff.  Start cilostazol 100 mg bid.  Continue aspirin and high-intensity statin.  Pt to start walking program goal of at least 30 minutes per day, 5 days per week.      2. HTN- Continue current medications.    3. HLD- LDL is at goal of <70.  Continue ASA and high intensity statin.    4. MURTAZA- Continue CPAP.    5. Overweight- Encourage diet, exercise and weight loss.     Follow up in 2 months    Total duration of face to face visit time 30 minutes.  Total time spent counseling greater than fifty percent of total visit time.  Counseling included discussion regarding imaging findings, diagnosis, possibilities, treatment options, risks and benefits.  The patient had many questions regarding the options and long-term effects.    Gabino Fry MD, PhD  Interventional Cardiology

## 2022-05-04 NOTE — PATIENT INSTRUCTIONS
-start cilostazol 100 mg bid  -pt to start walking program with goal of at least 30 minutes a day/5 days a week  -check CTA abd/pelvis with iliofemoral runoff and MRI lumbar spine prior to next visit  -follow up in 2 months

## 2022-05-05 ENCOUNTER — SPECIALTY PHARMACY (OUTPATIENT)
Dept: PHARMACY | Facility: CLINIC | Age: 62
End: 2022-05-05
Payer: COMMERCIAL

## 2022-05-05 NOTE — TELEPHONE ENCOUNTER
Specialty Pharmacy - Refill Coordination    Specialty Medication Orders Linked to Encounter    Flowsheet Row Most Recent Value   Medication #1 evolocumab (REPATHA SURECLICK) 140 mg/mL PnIj (Order#[id number], Rx#)          Refill Questions - Documented Responses    Flowsheet Row Most Recent Value   Patient Availability and HIPAA Verification    Does patient want to proceed with activity? Yes   HIPAA/medical authority confirmed? Yes   Relationship to patient of person spoken to? Self   Refill Screening Questions    Would patient like to speak to a pharmacist? No   When does the patient need to receive the medication? 05/09/22   Refill Delivery Questions    How will the patient receive the medication? Delivery Toshia   When does the patient need to receive the medication? 05/09/22   Shipping Address Prescription   Address in [facility] confirmed and updated if neccessary? Yes   Expected Copay ($) 5   Is the patient able to afford the medication copay? Yes   Payment Method CC on file   Days supply of Refill 28   Refill activity completed? Yes   Refill activity plan Refill scheduled   Shipment/Pickup Date: 05/06/22          Current Outpatient Medications   Medication Sig    ACCU-CHEK EDIN PLUS METER Misc     albuterol (PROVENTIL/VENTOLIN HFA) 90 mcg/actuation inhaler Inhale 2 puffs into the lungs every 6 (six) hours as needed for Wheezing.    albuterol-ipratropium (DUO-NEB) 2.5 mg-0.5 mg/3 mL nebulizer solution Inhale 3 mLs into the lungs.    amLODIPine (NORVASC) 10 MG tablet Take 1 tablet (10 mg total) by mouth once daily.    aspirin 81 mg Tab Take 81 mg by mouth. 1 Tablet Oral Every day    atorvastatin (LIPITOR) 40 MG tablet Take 1 tablet (40 mg total) by mouth every evening.    blood sugar diagnostic (ACCU-CHEK EDIN PLUS TEST STRP) Strp TEST BLOOD SUGARS 4 TIMES DAILY    blood-glucose meter,continuous (DEXCOM G6 ) Misc 1 each by Misc.(Non-Drug; Combo Route) route continuous    blood-glucose  "sensor (DEXCOM G6 SENSOR) Leann Change sensor every 10 days    blood-glucose transmitter (DEXCOM G6 TRANSMITTER) Leann Use as directed    cilostazoL (PLETAL) 100 MG Tab Take 1 tablet (100 mg total) by mouth 2 (two) times daily.    diclofenac sodium (VOLTAREN) 1 % Gel Apply 2 g topically 3 (three) times daily. Apply to the area of pain 2-3x per day or night as needed    dulaglutide (TRULICITY) 4.5 mg/0.5 mL pen injector Inject 4.5 mg into the skin every 7 days.    EScitalopram oxalate (LEXAPRO) 10 MG tablet Take 1 tablet (10 mg total) by mouth once daily.    eszopiclone (LUNESTA) 2 MG Tab Take 1 tablet (2 mg total) by mouth every evening.    evolocumab (REPATHA SURECLICK) 140 mg/mL PnIj Inject 1 mL (140 mg total) into the skin every 14 (fourteen) days.    evolocumab (REPATHA SURECLICK) 140 mg/mL PnIj Inject 1 mL (140 mg total) into the skin every 14 (fourteen) days.    hydroCHLOROthiazide (HYDRODIURIL) 12.5 MG Tab Take 1 tablet (12.5 mg total) by mouth once daily.    isosorbide mononitrate (ISMO,MONOKET) 10 mg tablet Take 1 tablet (10 mg total) by mouth once daily.    metoprolol tartrate (LOPRESSOR) 50 MG tablet TAKE 1 TABLET BY MOUTH TWICE A DAY    multivitamin (THERAGRAN) per tablet Take 1 tablet by mouth once daily.    pen needle, diabetic (NOVOTWIST) 32 gauge x 1/5" Ndle Use 1 needle to inject insulin four times daily    potassium chloride SA (K-DUR,KLOR-CON) 20 MEQ tablet Take 1 tablet (20 mEq total) by mouth 2 (two) times daily.    promethazine (PHENERGAN) 12.5 MG Tab Take 1 tablet (12.5 mg total) by mouth every 8 (eight) hours as needed (nausea).    telmisartan (MICARDIS) 80 MG Tab Take 1 tablet (80 mg total) by mouth once daily. Stop losartan    varicella-zoster gE-AS01B, PF, (SHINGRIX) 50 mcg/0.5 mL injection Inject into the muscle.   Last reviewed on 5/4/2022  9:04 AM by Jennifer Sheriff MA    Review of patient's allergies indicates:   Allergen Reactions    Milk containing products      " Causes GI distress    Last reviewed on  5/4/2022 9:03 AM by Jennifer Sheriff      Tasks added this encounter   5/30/2022 - Refill Call (Auto Added)   Tasks due within next 3 months   No tasks due.     Marybeth Soria, PharmD  [facility]  [address]  [facility] [address]  Phone: [phone]  Fax: [phone]

## 2022-05-09 RX ORDER — DAPAGLIFLOZIN 10 MG/1
10 TABLET, FILM COATED ORAL DAILY
Qty: 90 TABLET | Refills: 1 | Status: SHIPPED | OUTPATIENT
Start: 2022-05-09 | End: 2022-12-26 | Stop reason: SDUPTHER

## 2022-05-17 ENCOUNTER — TELEPHONE (OUTPATIENT)
Dept: INTERNAL MEDICINE | Facility: CLINIC | Age: 62
End: 2022-05-17

## 2022-05-17 ENCOUNTER — LAB VISIT (OUTPATIENT)
Dept: LAB | Facility: HOSPITAL | Age: 62
End: 2022-05-17
Attending: INTERNAL MEDICINE
Payer: COMMERCIAL

## 2022-05-17 ENCOUNTER — OFFICE VISIT (OUTPATIENT)
Dept: INTERNAL MEDICINE | Facility: CLINIC | Age: 62
End: 2022-05-17
Payer: COMMERCIAL

## 2022-05-17 VITALS
DIASTOLIC BLOOD PRESSURE: 60 MMHG | HEIGHT: 64 IN | WEIGHT: 158.94 LBS | HEART RATE: 70 BPM | RESPIRATION RATE: 10 BRPM | SYSTOLIC BLOOD PRESSURE: 120 MMHG | BODY MASS INDEX: 27.13 KG/M2

## 2022-05-17 DIAGNOSIS — I15.2 HYPERTENSION ASSOCIATED WITH DIABETES: Chronic | ICD-10-CM

## 2022-05-17 DIAGNOSIS — E11.22 TYPE 2 DIABETES MELLITUS WITH STAGE 2 CHRONIC KIDNEY DISEASE AND HYPERTENSION: Primary | Chronic | ICD-10-CM

## 2022-05-17 DIAGNOSIS — E11.22 TYPE 2 DIABETES MELLITUS WITH STAGE 2 CHRONIC KIDNEY DISEASE AND HYPERTENSION: Chronic | ICD-10-CM

## 2022-05-17 DIAGNOSIS — E78.5 HYPERLIPIDEMIA ASSOCIATED WITH TYPE 2 DIABETES MELLITUS: Chronic | ICD-10-CM

## 2022-05-17 DIAGNOSIS — E11.51 TYPE 2 DIABETES MELLITUS WITH DIABETIC PERIPHERAL ANGIOPATHY WITHOUT GANGRENE, WITHOUT LONG-TERM CURRENT USE OF INSULIN: Chronic | ICD-10-CM

## 2022-05-17 DIAGNOSIS — N18.2 TYPE 2 DIABETES MELLITUS WITH STAGE 2 CHRONIC KIDNEY DISEASE AND HYPERTENSION: Chronic | ICD-10-CM

## 2022-05-17 DIAGNOSIS — E11.59 HYPERTENSION ASSOCIATED WITH DIABETES: Chronic | ICD-10-CM

## 2022-05-17 DIAGNOSIS — E11.69 HYPERLIPIDEMIA ASSOCIATED WITH TYPE 2 DIABETES MELLITUS: Chronic | ICD-10-CM

## 2022-05-17 DIAGNOSIS — N18.2 TYPE 2 DIABETES MELLITUS WITH STAGE 2 CHRONIC KIDNEY DISEASE AND HYPERTENSION: Primary | Chronic | ICD-10-CM

## 2022-05-17 DIAGNOSIS — I12.9 TYPE 2 DIABETES MELLITUS WITH STAGE 2 CHRONIC KIDNEY DISEASE AND HYPERTENSION: Primary | Chronic | ICD-10-CM

## 2022-05-17 DIAGNOSIS — I12.9 TYPE 2 DIABETES MELLITUS WITH STAGE 2 CHRONIC KIDNEY DISEASE AND HYPERTENSION: Chronic | ICD-10-CM

## 2022-05-17 LAB
ALBUMIN SERPL BCP-MCNC: 3.8 G/DL (ref 3.5–5.2)
ALP SERPL-CCNC: 112 U/L (ref 55–135)
ALT SERPL W/O P-5'-P-CCNC: 16 U/L (ref 10–44)
ANION GAP SERPL CALC-SCNC: 10 MMOL/L (ref 8–16)
AST SERPL-CCNC: 16 U/L (ref 10–40)
BILIRUB SERPL-MCNC: 0.8 MG/DL (ref 0.1–1)
BUN SERPL-MCNC: 21 MG/DL (ref 8–23)
CALCIUM SERPL-MCNC: 9.9 MG/DL (ref 8.7–10.5)
CHLORIDE SERPL-SCNC: 107 MMOL/L (ref 95–110)
CO2 SERPL-SCNC: 25 MMOL/L (ref 23–29)
CREAT SERPL-MCNC: 1.1 MG/DL (ref 0.5–1.4)
EST. GFR  (AFRICAN AMERICAN): >60 ML/MIN/1.73 M^2
EST. GFR  (NON AFRICAN AMERICAN): 54.3 ML/MIN/1.73 M^2
ESTIMATED AVG GLUCOSE: 189 MG/DL (ref 68–131)
GLUCOSE SERPL-MCNC: 192 MG/DL (ref 70–110)
HBA1C MFR BLD: 8.2 % (ref 4–5.6)
POTASSIUM SERPL-SCNC: 4.1 MMOL/L (ref 3.5–5.1)
PROT SERPL-MCNC: 7.4 G/DL (ref 6–8.4)
SODIUM SERPL-SCNC: 142 MMOL/L (ref 136–145)

## 2022-05-17 PROCEDURE — 3078F DIAST BP <80 MM HG: CPT | Mod: CPTII,S$GLB,, | Performed by: INTERNAL MEDICINE

## 2022-05-17 PROCEDURE — 1159F PR MEDICATION LIST DOCUMENTED IN MEDICAL RECORD: ICD-10-PCS | Mod: CPTII,S$GLB,, | Performed by: INTERNAL MEDICINE

## 2022-05-17 PROCEDURE — 3008F BODY MASS INDEX DOCD: CPT | Mod: CPTII,S$GLB,, | Performed by: INTERNAL MEDICINE

## 2022-05-17 PROCEDURE — 3051F HG A1C>EQUAL 7.0%<8.0%: CPT | Mod: CPTII,S$GLB,, | Performed by: INTERNAL MEDICINE

## 2022-05-17 PROCEDURE — 3074F PR MOST RECENT SYSTOLIC BLOOD PRESSURE < 130 MM HG: ICD-10-PCS | Mod: CPTII,S$GLB,, | Performed by: INTERNAL MEDICINE

## 2022-05-17 PROCEDURE — 3078F PR MOST RECENT DIASTOLIC BLOOD PRESSURE < 80 MM HG: ICD-10-PCS | Mod: CPTII,S$GLB,, | Performed by: INTERNAL MEDICINE

## 2022-05-17 PROCEDURE — 80053 COMPREHEN METABOLIC PANEL: CPT | Performed by: INTERNAL MEDICINE

## 2022-05-17 PROCEDURE — 99214 PR OFFICE/OUTPT VISIT, EST, LEVL IV, 30-39 MIN: ICD-10-PCS | Mod: S$GLB,,, | Performed by: INTERNAL MEDICINE

## 2022-05-17 PROCEDURE — 3072F PR LOW RISK FOR RETINOPATHY: ICD-10-PCS | Mod: CPTII,S$GLB,, | Performed by: INTERNAL MEDICINE

## 2022-05-17 PROCEDURE — 3051F PR MOST RECENT HEMOGLOBIN A1C LEVEL 7.0 - < 8.0%: ICD-10-PCS | Mod: CPTII,S$GLB,, | Performed by: INTERNAL MEDICINE

## 2022-05-17 PROCEDURE — 1159F MED LIST DOCD IN RCRD: CPT | Mod: CPTII,S$GLB,, | Performed by: INTERNAL MEDICINE

## 2022-05-17 PROCEDURE — 1160F RVW MEDS BY RX/DR IN RCRD: CPT | Mod: CPTII,S$GLB,, | Performed by: INTERNAL MEDICINE

## 2022-05-17 PROCEDURE — 4010F PR ACE/ARB THEARPY RXD/TAKEN: ICD-10-PCS | Mod: CPTII,S$GLB,, | Performed by: INTERNAL MEDICINE

## 2022-05-17 PROCEDURE — 99214 OFFICE O/P EST MOD 30 MIN: CPT | Mod: S$GLB,,, | Performed by: INTERNAL MEDICINE

## 2022-05-17 PROCEDURE — 3072F LOW RISK FOR RETINOPATHY: CPT | Mod: CPTII,S$GLB,, | Performed by: INTERNAL MEDICINE

## 2022-05-17 PROCEDURE — 4010F ACE/ARB THERAPY RXD/TAKEN: CPT | Mod: CPTII,S$GLB,, | Performed by: INTERNAL MEDICINE

## 2022-05-17 PROCEDURE — 3074F SYST BP LT 130 MM HG: CPT | Mod: CPTII,S$GLB,, | Performed by: INTERNAL MEDICINE

## 2022-05-17 PROCEDURE — 1160F PR REVIEW ALL MEDS BY PRESCRIBER/CLIN PHARMACIST DOCUMENTED: ICD-10-PCS | Mod: CPTII,S$GLB,, | Performed by: INTERNAL MEDICINE

## 2022-05-17 PROCEDURE — 3008F PR BODY MASS INDEX (BMI) DOCUMENTED: ICD-10-PCS | Mod: CPTII,S$GLB,, | Performed by: INTERNAL MEDICINE

## 2022-05-17 PROCEDURE — 99999 PR PBB SHADOW E&M-EST. PATIENT-LVL III: ICD-10-PCS | Mod: PBBFAC,,, | Performed by: INTERNAL MEDICINE

## 2022-05-17 PROCEDURE — 99999 PR PBB SHADOW E&M-EST. PATIENT-LVL III: CPT | Mod: PBBFAC,,, | Performed by: INTERNAL MEDICINE

## 2022-05-17 PROCEDURE — 83036 HEMOGLOBIN GLYCOSYLATED A1C: CPT | Performed by: INTERNAL MEDICINE

## 2022-05-17 PROCEDURE — 36415 COLL VENOUS BLD VENIPUNCTURE: CPT | Mod: PO | Performed by: INTERNAL MEDICINE

## 2022-05-17 RX ORDER — ESZOPICLONE 2 MG/1
2 TABLET, FILM COATED ORAL NIGHTLY
Qty: 30 TABLET | Refills: 0 | Status: SHIPPED | OUTPATIENT
Start: 2022-05-17 | End: 2022-06-16 | Stop reason: SDUPTHER

## 2022-05-17 NOTE — PROGRESS NOTES
Subjective:       Patient ID: Mariann Huff is a 62 y.o. female.    Chief Complaint: Follow-up    HPI     62-year-old female here for 6 month follow-up.    Diabetes-Trulicity 4.5 mg weekly.  She is checking her BG at home.  They run as high as 182.  She went to get her trulicity the other day and was told $890 for this.  She did not take it this week, because she did not have it.  Her BG were 90s-120s.  Lab Results   Component Value Date    HGBA1C 7.7 (H) 03/31/2022    HGBA1C 7.9 (H) 11/17/2021    HGBA1C 7.6 (H) 09/16/2021     Lab Results   Component Value Date    GLUF 217 (H) 09/15/2014    LDLCALC 35.4 (L) 03/31/2022    CREATININE 1.09 03/31/2022     HTN -  Patient's co morbidities include:  Diabetes. Patient is currently on amlodipine 10 mg, Lopressor 50 mg, Micardis 80 mg, HCTZ 12.5 mg. She does not check her BP at home. Side effects of medications note: none. Denies headaches, blurred vision, chest pain, shortness of breath, nausea.    HLD - Patient is currently on Lipitor 40 mg.  Her last lipid panel was   Cholesterol   Date Value Ref Range Status   03/31/2022 102 (L) 120 - 199 mg/dL Final     Comment:     The National Cholesterol Education Program (NCEP) has set the  following guidelines (reference ranges) for Cholesterol:  Optimal.....................<200 mg/dL  Borderline High.............200-239 mg/dL  High........................> or = 240 mg/dL       Triglycerides   Date Value Ref Range Status   03/31/2022 88 30 - 150 mg/dL Final     Comment:     The National Cholesterol Education Program (NCEP) has set the  following guidelines (reference values) for triglycerides:  Normal......................<150 mg/dL  Borderline High.............150-199 mg/dL  High........................200-499 mg/dL       HDL   Date Value Ref Range Status   03/31/2022 49 40 - 75 mg/dL Final     Comment:     The National Cholesterol Education Program (NCEP) has set the  following guidelines (reference values) for HDL  Cholesterol:  Low...............<40 mg/dL  Optimal...........>60 mg/dL       LDL Cholesterol   Date Value Ref Range Status   03/31/2022 35.4 (L) 63.0 - 159.0 mg/dL Final     Comment:     The National Cholesterol Education Program (NCEP) has set the  following guidelines (reference values) for LDL Cholesterol:  Optimal.......................<130 mg/dL  Borderline High...............130-159 mg/dL  High..........................160-189 mg/dL  Very High.....................>190 mg/dL     .  Side effects of the medication: none.    Review of Systems      Objective:      Physical Exam  Vitals reviewed.   Constitutional:       Appearance: She is well-developed.   HENT:      Head: Normocephalic and atraumatic.      Mouth/Throat:      Pharynx: No oropharyngeal exudate.   Eyes:      General: No scleral icterus.        Right eye: No discharge.         Left eye: No discharge.      Pupils: Pupils are equal, round, and reactive to light.   Neck:      Thyroid: No thyromegaly.      Trachea: No tracheal deviation.   Cardiovascular:      Rate and Rhythm: Normal rate and regular rhythm.      Heart sounds: Normal heart sounds. No murmur heard.    No friction rub. No gallop.   Pulmonary:      Effort: Pulmonary effort is normal. No respiratory distress.      Breath sounds: Normal breath sounds. No wheezing or rales.   Chest:      Chest wall: No tenderness.   Abdominal:      General: Bowel sounds are normal. There is no distension.      Palpations: Abdomen is soft. There is no mass.      Tenderness: There is no abdominal tenderness. There is no guarding or rebound.   Musculoskeletal:         General: No tenderness. Normal range of motion.      Cervical back: Normal range of motion and neck supple.   Skin:     General: Skin is warm and dry.      Coloration: Skin is not pale.      Findings: No erythema or rash.   Neurological:      Mental Status: She is alert and oriented to person, place, and time.   Psychiatric:         Behavior: Behavior  normal.         Assessment:       1. Type 2 diabetes mellitus with stage 2 chronic kidney disease and hypertension  - Hemoglobin A1C; Future  - Comprehensive Metabolic Panel; Future    2. Type 2 diabetes mellitus with diabetic peripheral angiopathy without gangrene, without long-term current use of insulin  - Hemoglobin A1C; Future  - Comprehensive Metabolic Panel; Future    3. Hypertension associated with diabetes    4. Hyperlipidemia associated with type 2 diabetes mellitus      Plan:       1/2.  Check A1c, CMP.  Continue Trulicity 4.5 mg weekly if able to workup cost.  3. Continue amlodipine 10 mg, Lopressor 50 mg, Micardis 80 mg, HCTZ 12.5 mg.  4. Continue Lipitor 40 mg

## 2022-05-17 NOTE — TELEPHONE ENCOUNTER
Please let patient know that [facility] looked into this, and said that she needs to get the Trulicity from either [facility], [facility], or [facility].  [facility] day [facility].

## 2022-05-26 ENCOUNTER — PATIENT MESSAGE (OUTPATIENT)
Dept: OTHER | Facility: OTHER | Age: 62
End: 2022-05-26
Payer: COMMERCIAL

## 2022-05-30 ENCOUNTER — SPECIALTY PHARMACY (OUTPATIENT)
Dept: PHARMACY | Facility: CLINIC | Age: 62
End: 2022-05-30
Payer: COMMERCIAL

## 2022-05-30 NOTE — TELEPHONE ENCOUNTER
Specialty Pharmacy - Refill Coordination    Specialty Medication Orders Linked to Encounter    Flowsheet Row Most Recent Value   Medication #1 evolocumab (REPATHA SURECLICK) 140 mg/mL PnIj (Order#[id number], Rx#)          Refill Questions - Documented Responses    Flowsheet Row Most Recent Value   Patient Availability and HIPAA Verification    Does patient want to proceed with activity? Yes   HIPAA/medical authority confirmed? Yes   Relationship to patient of person spoken to? Self   Refill Screening Questions    Would patient like to speak to a pharmacist? No   When does the patient need to receive the medication? 06/07/22   Refill Delivery Questions    How will the patient receive the medication? Delivery [facility]   When does the patient need to receive the medication? 06/07/22   Shipping Address Home   Address in [facility] confirmed and updated if neccessary? Yes   Expected Copay ($) 5   Is the patient able to afford the medication copay? Yes   Payment Method CC on file   Days supply of Refill 28   Supplies needed? No supplies needed   Refill activity completed? Yes   Refill activity plan Refill scheduled   Shipment/Pickup Date: 06/06/22          Current Outpatient Medications   Medication Sig    ACCU-CHEK EDIN PLUS METER Misc     albuterol (PROVENTIL/VENTOLIN HFA) 90 mcg/actuation inhaler Inhale 2 puffs into the lungs every 6 (six) hours as needed for Wheezing.    albuterol-ipratropium (DUO-NEB) 2.5 mg-0.5 mg/3 mL nebulizer solution Inhale 3 mLs into the lungs.    amLODIPine (NORVASC) 10 MG tablet Take 1 tablet (10 mg total) by mouth once daily.    aspirin 81 mg Tab Take 81 mg by mouth. 1 Tablet Oral Every day    atorvastatin (LIPITOR) 40 MG tablet Take 1 tablet (40 mg total) by mouth every evening.    blood sugar diagnostic (ACCU-CHEK DEIN PLUS TEST STRP) Strp TEST BLOOD SUGARS 4 TIMES DAILY    blood-glucose meter,continuous (DEXCOM G6 ) Misc 1 each by Misc.(Non-Drug; Combo Route) route  "continuous    blood-glucose sensor (DEXCOM G6 SENSOR) Leann Change sensor every 10 days    blood-glucose transmitter (DEXCOM G6 TRANSMITTER) Leann Use as directed    cilostazoL (PLETAL) 100 MG Tab Take 1 tablet (100 mg total) by mouth 2 (two) times daily.    dapagliflozin (FARXIGA) 10 mg tablet Take 1 tablet (10 mg total) by mouth once daily.    diclofenac sodium (VOLTAREN) 1 % Gel Apply 2 g topically 3 (three) times daily. Apply to the area of pain 2-3x per day or night as needed    dulaglutide (TRULICITY) 4.5 mg/0.5 mL pen injector Inject 4.5 mg into the skin every 7 days.    EScitalopram oxalate (LEXAPRO) 10 MG tablet Take 1 tablet (10 mg total) by mouth once daily.    eszopiclone (LUNESTA) 2 MG Tab Take 1 tablet (2 mg total) by mouth every evening.    evolocumab (REPATHA SURECLICK) 140 mg/mL PnIj Inject 1 mL (140 mg total) into the skin every 14 (fourteen) days.    hydroCHLOROthiazide (HYDRODIURIL) 12.5 MG Tab Take 1 tablet (12.5 mg total) by mouth once daily.    isosorbide mononitrate (ISMO,MONOKET) 10 mg tablet Take 1 tablet (10 mg total) by mouth once daily.    metoprolol tartrate (LOPRESSOR) 50 MG tablet TAKE 1 TABLET BY MOUTH TWICE A DAY    multivitamin (THERAGRAN) per tablet Take 1 tablet by mouth once daily.    pen needle, diabetic (NOVOTWIST) 32 gauge x 1/5" Ndle Use 1 needle to inject insulin four times daily    potassium chloride SA (K-DUR,KLOR-CON) 20 MEQ tablet Take 1 tablet (20 mEq total) by mouth 2 (two) times daily.    promethazine (PHENERGAN) 12.5 MG Tab Take 1 tablet (12.5 mg total) by mouth every 8 (eight) hours as needed (nausea).    telmisartan (MICARDIS) 80 MG Tab Take 1 tablet (80 mg total) by mouth once daily. Stop losartan    varicella-zoster gE-AS01B, PF, (SHINGRIX) 50 mcg/0.5 mL injection Inject into the muscle.   Last reviewed on 5/17/2022  8:17 AM by Jasbir Haney MD    Review of patient's allergies indicates:   Allergen Reactions    Milk containing products      Causes " GI distress    Last reviewed on  5/17/2022 8:17 AM by Jasbir Haney      Tasks added this encounter   6/28/2022 - Refill Call (Auto Added)   Tasks due within next 3 months   No tasks due.     Dalia Anne, PharmD  [facility]  [address] [place], [place] [address]  [facility] [address]  Phone: [phone]  Fax: [phone]

## 2022-05-31 ENCOUNTER — PATIENT MESSAGE (OUTPATIENT)
Dept: OTHER | Facility: OTHER | Age: 62
End: 2022-05-31
Payer: COMMERCIAL

## 2022-06-08 ENCOUNTER — PATIENT MESSAGE (OUTPATIENT)
Dept: INTERNAL MEDICINE | Facility: CLINIC | Age: 62
End: 2022-06-08
Payer: COMMERCIAL

## 2022-06-09 ENCOUNTER — PATIENT MESSAGE (OUTPATIENT)
Dept: INTERNAL MEDICINE | Facility: CLINIC | Age: 62
End: 2022-06-09
Payer: COMMERCIAL

## 2022-06-09 RX ORDER — FLUCONAZOLE 150 MG/1
150 TABLET ORAL ONCE
Qty: 2 TABLET | Refills: 0 | Status: SHIPPED | OUTPATIENT
Start: 2022-06-09 | End: 2022-06-18

## 2022-06-10 NOTE — PROGRESS NOTES
"Subjective:     @Patient ID: Mariann Huff is a 62 y.o. female.    Chief Complaint: foot wound (Closed wound on heel of left foot. )    HPI     61 y.o. female with HTN, HLD, DM2,  CAD s/p stent presents for wound of left heel approx 1 week ago.   Noticed some drainage.   She applied neosporin and reports that wound closed after a few days. Pt reports pain over the area with ambulation. Feels that wound is improving.        Review of Systems   Constitutional: Negative for chills and fever.   HENT: Negative for congestion and sore throat.    Eyes: Negative for pain and visual disturbance.   Respiratory: Negative for cough and shortness of breath.    Cardiovascular: Negative for chest pain and leg swelling.   Gastrointestinal: Negative for abdominal pain, nausea and vomiting.   Endocrine: Negative for polydipsia and polyuria.   Genitourinary: Negative for difficulty urinating and dysuria.   Musculoskeletal: Negative for arthralgias and back pain.   Skin: Positive for wound. Negative for rash.   Neurological: Negative for dizziness, weakness and headaches.   Psychiatric/Behavioral: Negative for agitation and confusion.     Past medical history, surgical history, and family medical history reviewed and updated as appropriate.    Medications and allergies reviewed.     Objective:     Vitals:    02/22/22 0953   BP: 112/64   BP Location: Right arm   Patient Position: Sitting   BP Method: Large (Manual)   Pulse: 76   Resp: 18   Temp: 97.5 °F (36.4 °C)   TempSrc: Temporal   SpO2: 98%   Weight: 69.7 kg (153 lb 10.6 oz)   Height: 5' 4" (1.626 m)     Body mass index is 26.38 kg/m².  Physical Exam  Constitutional:       Appearance: Normal appearance.   HENT:      Head: Normocephalic and atraumatic.   Eyes:      General:         Right eye: No discharge.         Left eye: No discharge.      Conjunctiva/sclera: Conjunctivae normal.   Cardiovascular:      Rate and Rhythm: Normal rate and regular rhythm.      Heart sounds: No " murmur heard.  Pulmonary:      Effort: Pulmonary effort is normal.      Breath sounds: Normal breath sounds.   Musculoskeletal:         General: Normal range of motion.      Cervical back: Normal range of motion and neck supple.      Right lower leg: No edema.      Left lower leg: No edema.   Skin:     General: Skin is warm and dry.      Comments: + wound of L heel.   No drainage. Mildly TTP    Neurological:      Mental Status: She is alert and oriented to person, place, and time.   Psychiatric:         Mood and Affect: Mood normal.         Behavior: Behavior normal.         Lab Results   Component Value Date    WBC 6.57 02/25/2022    HGB 11.9 (L) 02/25/2022    HCT 39.1 02/25/2022     02/25/2022    CHOL 102 (L) 03/31/2022    TRIG 88 03/31/2022    HDL 49 03/31/2022    ALT 16 05/17/2022    AST 16 05/17/2022     05/17/2022    K 4.1 05/17/2022     05/17/2022    CREATININE 1.1 05/17/2022    BUN 21 05/17/2022    CO2 25 05/17/2022    TSH 1.000 03/31/2022    INR 0.9 11/25/2019    GLUF 217 (H) 09/15/2014    HGBA1C 8.2 (H) 05/17/2022           Assessment:     1. Wound of left foot    2. Type 2 diabetes mellitus with stage 2 chronic kidney disease and hypertension      Plan:   [facility] was seen today for foot wound.    Diagnoses and all orders for this visit:    Wound of left foot  -     X-Ray Foot Complete 3 view Left; Future  -     CBC W/ AUTO DIFFERENTIAL; Future  -     Basic Metabolic Panel; Future  -     Sedimentation rate; Future  -     C-reactive protein; Future  -     Ambulatory referral/consult to Podiatry; Future    Type 2 diabetes mellitus with stage 2 chronic kidney disease and hypertension  -     Ambulatory referral/consult to Podiatry; Future          No follow-ups on file.    Shaniqua Culp MD  Internal Medicine

## 2022-06-15 ENCOUNTER — TELEPHONE (OUTPATIENT)
Dept: CARDIOLOGY | Facility: CLINIC | Age: 62
End: 2022-06-15
Payer: COMMERCIAL

## 2022-06-15 NOTE — TELEPHONE ENCOUNTER
----- Message from Adelaide Aguilar MD sent at 6/15/2022  5:51 AM CDT -----  Please inform the patient that lipids are at goal.  Diabete still needs to be better controlled.

## 2022-06-17 RX ORDER — ESZOPICLONE 2 MG/1
2 TABLET, FILM COATED ORAL NIGHTLY
Qty: 30 TABLET | Refills: 0 | Status: SHIPPED | OUTPATIENT
Start: 2022-06-17 | End: 2022-07-21 | Stop reason: SDUPTHER

## 2022-06-28 ENCOUNTER — SPECIALTY PHARMACY (OUTPATIENT)
Dept: PHARMACY | Facility: CLINIC | Age: 62
End: 2022-06-28
Payer: COMMERCIAL

## 2022-06-28 NOTE — TELEPHONE ENCOUNTER
Spoke w pt regarding Repatha refill. Pt injected on 6/27 and will be due on 7/11. Will follow up on 7/4.

## 2022-07-05 NOTE — TELEPHONE ENCOUNTER
Specialty Pharmacy - Refill Coordination    Specialty Medication Orders Linked to Encounter    Flowsheet Row Most Recent Value   Medication #1 evolocumab (REPATHA SURECLICK) 140 mg/mL PnIj (Order#[id number], Rx#[id number])          Refill Questions - Documented Responses    Flowsheet Row Most Recent Value   Patient Availability and HIPAA Verification    Does patient want to proceed with activity? Yes   HIPAA/medical authority confirmed? Yes   Relationship to patient of person spoken to? Self   Refill Screening Questions    Would patient like to speak to a pharmacist? No   When does the patient need to receive the medication? 07/11/22   Refill Delivery Questions    How will the patient receive the medication? Delivery [facility]   When does the patient need to receive the medication? 07/11/22   Shipping Address Home   Address in [facility] confirmed and updated if neccessary? Yes   Expected Copay ($) 0   Is the patient able to afford the medication copay? Yes   Payment Method zero copay   Days supply of Refill 28   Supplies needed? No supplies needed   Refill activity completed? Yes   Refill activity plan Refill scheduled   Shipment/Pickup Date: 07/07/22          Current Outpatient Medications   Medication Sig    ACCU-CHEK EDIN PLUS METER [facility]     albuterol (PROVENTIL/VENTOLIN HFA) 90 mcg/actuation inhaler Inhale 2 puffs into the lungs every 6 (six) hours as needed for Wheezing.    albuterol-ipratropium (DUO-NEB) 2.5 mg-0.5 mg/3 mL nebulizer solution Inhale 3 mLs into the lungs.    amLODIPine (NORVASC) 10 MG tablet Take 1 tablet (10 mg total) by mouth once daily.    aspirin 81 mg Tab Take 81 mg by mouth. 1 Tablet Oral Every day    atorvastatin (LIPITOR) 40 MG tablet Take 1 tablet (40 mg total) by mouth every evening.    blood sugar diagnostic (ACCU-CHEK EDIN PLUS TEST STRP) Strp TEST BLOOD SUGARS 4 TIMES DAILY    blood-glucose meter,continuous (DEXCOM G6 ) Misc 1 each by Misc.(Non-Drug; Combo  "Route) route continuous    blood-glucose sensor (DEXCOM G6 SENSOR) Leann Change sensor every 10 days    blood-glucose transmitter (DEXCOM G6 TRANSMITTER) Leann Use as directed    cilostazoL (PLETAL) 100 MG Tab Take 1 tablet (100 mg total) by mouth 2 (two) times daily.    dapagliflozin (FARXIGA) 10 mg tablet Take 1 tablet (10 mg total) by mouth once daily.    diclofenac sodium (VOLTAREN) 1 % Gel Apply 2 g topically 3 (three) times daily. Apply to the area of pain 2-3x per day or night as needed    dulaglutide (TRULICITY) 4.5 mg/0.5 mL pen injector Inject 4.5 mg into the skin every 7 days.    EScitalopram oxalate (LEXAPRO) 10 MG tablet Take 1 tablet (10 mg total) by mouth once daily.    eszopiclone (LUNESTA) 2 MG Tab Take 1 tablet (2 mg total) by mouth every evening.    evolocumab (REPATHA SURECLICK) 140 mg/mL PnIj Inject 1 mL (140 mg total) into the skin every 14 (fourteen) days.    hydroCHLOROthiazide (HYDRODIURIL) 12.5 MG Tab Take 1 tablet (12.5 mg total) by mouth once daily.    isosorbide mononitrate (ISMO,MONOKET) 10 mg tablet Take 1 tablet (10 mg total) by mouth once daily.    metoprolol tartrate (LOPRESSOR) 50 MG tablet TAKE 1 TABLET BY MOUTH TWICE A DAY    multivitamin (THERAGRAN) per tablet Take 1 tablet by mouth once daily.    pen needle, diabetic (NOVOTWIST) 32 gauge x 1/5" Ndle Use 1 needle to inject insulin four times daily    potassium chloride SA (K-DUR,KLOR-CON) 20 MEQ tablet Take 1 tablet (20 mEq total) by mouth 2 (two) times daily.    promethazine (PHENERGAN) 12.5 MG Tab Take 1 tablet (12.5 mg total) by mouth every 8 (eight) hours as needed (nausea).    telmisartan (MICARDIS) 80 MG Tab Take 1 tablet (80 mg total) by mouth once daily. Stop losartan    varicella-zoster gE-AS01B, PF, (SHINGRIX) 50 mcg/0.5 mL injection Inject into the muscle.   Last reviewed on 5/17/2022  8:17 AM by Jasbir Haney MD    Review of patient's allergies indicates:   Allergen Reactions    Milk containing products  "     Causes GI distress    Last reviewed on  6/9/2022 8:17 AM by Jasbir Haney      Tasks added this encounter   8/1/2022 - Refill Call (Auto Added)   Tasks due within next 3 months   No tasks due.     Edilma Mckinney, Patient Care Assistant  [facility]  [address]  [facility] [address]  Phone: [phone]  Fax: [phone]

## 2022-07-20 ENCOUNTER — PATIENT MESSAGE (OUTPATIENT)
Dept: INTERNAL MEDICINE | Facility: CLINIC | Age: 62
End: 2022-07-20
Payer: COMMERCIAL

## 2022-07-21 RX ORDER — ESZOPICLONE 2 MG/1
2 TABLET, FILM COATED ORAL NIGHTLY
Qty: 30 TABLET | Refills: 0 | Status: SHIPPED | OUTPATIENT
Start: 2022-07-21 | End: 2022-08-26 | Stop reason: SDUPTHER

## 2022-07-27 RX ORDER — PROMETHAZINE HYDROCHLORIDE 12.5 MG/1
12.5 TABLET ORAL EVERY 8 HOURS PRN
Qty: 30 TABLET | Refills: 1 | Status: SHIPPED | OUTPATIENT
Start: 2022-07-27 | End: 2023-02-26 | Stop reason: SDUPTHER

## 2022-07-29 ENCOUNTER — OFFICE VISIT (OUTPATIENT)
Dept: NEUROSURGERY | Facility: CLINIC | Age: 62
End: 2022-07-29
Payer: COMMERCIAL

## 2022-07-29 VITALS
BODY MASS INDEX: 27.13 KG/M2 | HEIGHT: 64 IN | WEIGHT: 158.94 LBS | HEART RATE: 70 BPM | SYSTOLIC BLOOD PRESSURE: 120 MMHG | DIASTOLIC BLOOD PRESSURE: 60 MMHG

## 2022-07-29 DIAGNOSIS — M54.41 CHRONIC RIGHT-SIDED LOW BACK PAIN WITH RIGHT-SIDED SCIATICA: Primary | ICD-10-CM

## 2022-07-29 DIAGNOSIS — M53.3 SACROILIAC JOINT DYSFUNCTION OF RIGHT SIDE: ICD-10-CM

## 2022-07-29 DIAGNOSIS — M47.26 OTHER SPONDYLOSIS WITH RADICULOPATHY, LUMBAR REGION: ICD-10-CM

## 2022-07-29 DIAGNOSIS — G89.29 CHRONIC RIGHT-SIDED LOW BACK PAIN WITH RIGHT-SIDED SCIATICA: Primary | ICD-10-CM

## 2022-07-29 DIAGNOSIS — M54.16 LUMBAR RADICULOPATHY: ICD-10-CM

## 2022-07-29 DIAGNOSIS — S22.080A COMPRESSION FRACTURE OF T12 VERTEBRA, INITIAL ENCOUNTER: ICD-10-CM

## 2022-07-29 PROCEDURE — 3008F PR BODY MASS INDEX (BMI) DOCUMENTED: ICD-10-PCS | Mod: CPTII,S$GLB,, | Performed by: PHYSICIAN ASSISTANT

## 2022-07-29 PROCEDURE — 99999 PR PBB SHADOW E&M-EST. PATIENT-LVL III: ICD-10-PCS | Mod: PBBFAC,,, | Performed by: PHYSICIAN ASSISTANT

## 2022-07-29 PROCEDURE — 3078F PR MOST RECENT DIASTOLIC BLOOD PRESSURE < 80 MM HG: ICD-10-PCS | Mod: CPTII,S$GLB,, | Performed by: PHYSICIAN ASSISTANT

## 2022-07-29 PROCEDURE — 1159F MED LIST DOCD IN RCRD: CPT | Mod: CPTII,S$GLB,, | Performed by: PHYSICIAN ASSISTANT

## 2022-07-29 PROCEDURE — 3072F PR LOW RISK FOR RETINOPATHY: ICD-10-PCS | Mod: CPTII,S$GLB,, | Performed by: PHYSICIAN ASSISTANT

## 2022-07-29 PROCEDURE — 3072F LOW RISK FOR RETINOPATHY: CPT | Mod: CPTII,S$GLB,, | Performed by: PHYSICIAN ASSISTANT

## 2022-07-29 PROCEDURE — 99999 PR PBB SHADOW E&M-EST. PATIENT-LVL III: CPT | Mod: PBBFAC,,, | Performed by: PHYSICIAN ASSISTANT

## 2022-07-29 PROCEDURE — 3052F PR MOST RECENT HEMOGLOBIN A1C LEVEL 8.0 - < 9.0%: ICD-10-PCS | Mod: CPTII,S$GLB,, | Performed by: PHYSICIAN ASSISTANT

## 2022-07-29 PROCEDURE — 1159F PR MEDICATION LIST DOCUMENTED IN MEDICAL RECORD: ICD-10-PCS | Mod: CPTII,S$GLB,, | Performed by: PHYSICIAN ASSISTANT

## 2022-07-29 PROCEDURE — 3074F SYST BP LT 130 MM HG: CPT | Mod: CPTII,S$GLB,, | Performed by: PHYSICIAN ASSISTANT

## 2022-07-29 PROCEDURE — 99214 OFFICE O/P EST MOD 30 MIN: CPT | Mod: S$GLB,,, | Performed by: PHYSICIAN ASSISTANT

## 2022-07-29 PROCEDURE — 3074F PR MOST RECENT SYSTOLIC BLOOD PRESSURE < 130 MM HG: ICD-10-PCS | Mod: CPTII,S$GLB,, | Performed by: PHYSICIAN ASSISTANT

## 2022-07-29 PROCEDURE — 4010F PR ACE/ARB THEARPY RXD/TAKEN: ICD-10-PCS | Mod: CPTII,S$GLB,, | Performed by: PHYSICIAN ASSISTANT

## 2022-07-29 PROCEDURE — 1160F RVW MEDS BY RX/DR IN RCRD: CPT | Mod: CPTII,S$GLB,, | Performed by: PHYSICIAN ASSISTANT

## 2022-07-29 PROCEDURE — 3078F DIAST BP <80 MM HG: CPT | Mod: CPTII,S$GLB,, | Performed by: PHYSICIAN ASSISTANT

## 2022-07-29 PROCEDURE — 3008F BODY MASS INDEX DOCD: CPT | Mod: CPTII,S$GLB,, | Performed by: PHYSICIAN ASSISTANT

## 2022-07-29 PROCEDURE — 1160F PR REVIEW ALL MEDS BY PRESCRIBER/CLIN PHARMACIST DOCUMENTED: ICD-10-PCS | Mod: CPTII,S$GLB,, | Performed by: PHYSICIAN ASSISTANT

## 2022-07-29 PROCEDURE — 4010F ACE/ARB THERAPY RXD/TAKEN: CPT | Mod: CPTII,S$GLB,, | Performed by: PHYSICIAN ASSISTANT

## 2022-07-29 PROCEDURE — 99214 PR OFFICE/OUTPT VISIT, EST, LEVL IV, 30-39 MIN: ICD-10-PCS | Mod: S$GLB,,, | Performed by: PHYSICIAN ASSISTANT

## 2022-07-29 PROCEDURE — 3052F HG A1C>EQUAL 8.0%<EQUAL 9.0%: CPT | Mod: CPTII,S$GLB,, | Performed by: PHYSICIAN ASSISTANT

## 2022-08-01 ENCOUNTER — PATIENT MESSAGE (OUTPATIENT)
Dept: PHARMACY | Facility: CLINIC | Age: 62
End: 2022-08-01
Payer: COMMERCIAL

## 2022-08-03 ENCOUNTER — HOSPITAL ENCOUNTER (OUTPATIENT)
Dept: RADIOLOGY | Facility: HOSPITAL | Age: 62
Discharge: HOME | End: 2022-08-03
Attending: INTERNAL MEDICINE
Payer: COMMERCIAL

## 2022-08-03 DIAGNOSIS — I73.9 CLAUDICATION OF BOTH LOWER EXTREMITIES: ICD-10-CM

## 2022-08-03 PROCEDURE — 72148 MRI LUMBAR SPINE WITHOUT CONTRAST: ICD-10-PCS | Mod: 26,,, | Performed by: RADIOLOGY

## 2022-08-03 PROCEDURE — 72148 MRI LUMBAR SPINE W/O DYE: CPT | Mod: 26,,, | Performed by: RADIOLOGY

## 2022-08-03 PROCEDURE — 25500020 PHARM REV CODE 255: Performed by: INTERNAL MEDICINE

## 2022-08-03 PROCEDURE — 75635 CTA RUNOFF ABD PEL BILAT LOWER EXT: ICD-10-PCS | Mod: 26,,, | Performed by: INTERNAL MEDICINE

## 2022-08-03 PROCEDURE — 75635 CT ANGIO ABDOMINAL ARTERIES: CPT | Mod: TC

## 2022-08-03 PROCEDURE — 72148 MRI LUMBAR SPINE W/O DYE: CPT | Mod: TC

## 2022-08-03 PROCEDURE — 75635 CT ANGIO ABDOMINAL ARTERIES: CPT | Mod: 26,,, | Performed by: INTERNAL MEDICINE

## 2022-08-03 RX ADMIN — IOHEXOL 100 ML: 350 INJECTION, SOLUTION INTRAVENOUS at 08:08

## 2022-08-04 ENCOUNTER — SPECIALTY PHARMACY (OUTPATIENT)
Dept: PHARMACY | Facility: CLINIC | Age: 62
End: 2022-08-04
Payer: COMMERCIAL

## 2022-08-04 LAB
CREAT SERPL-MCNC: 0.9 MG/DL (ref 0.5–1.4)
SAMPLE: NORMAL

## 2022-08-04 NOTE — TELEPHONE ENCOUNTER
Specialty Pharmacy - Refill Coordination    Specialty Medication Orders Linked to Encounter    Flowsheet Row Most Recent Value   Medication #1 evolocumab (REPATHA SURECLICK) 140 mg/mL PnIj (Order#[id number], Rx#[id number])          Refill Questions - Documented Responses    Flowsheet Row Most Recent Value   Patient Availability and HIPAA Verification    Does patient want to proceed with activity? Yes   HIPAA/medical authority confirmed? Yes   Relationship to patient of person spoken to? Self          Current Outpatient Medications   Medication Sig    ACCU-CHEK EDIN PLUS METER Misc     albuterol (PROVENTIL/VENTOLIN HFA) 90 mcg/actuation inhaler Inhale 2 puffs into the lungs every 6 (six) hours as needed for Wheezing.    albuterol-ipratropium (DUO-NEB) 2.5 mg-0.5 mg/3 mL nebulizer solution Inhale 3 mLs into the lungs.    amLODIPine (NORVASC) 10 MG tablet Take 1 tablet (10 mg total) by mouth once daily.    aspirin 81 mg Tab Take 81 mg by mouth. 1 Tablet Oral Every day    atorvastatin (LIPITOR) 40 MG tablet Take 1 tablet (40 mg total) by mouth every evening.    blood sugar diagnostic (ACCU-CHEK EDIN PLUS TEST STRP) Strp TEST BLOOD SUGARS 4 TIMES DAILY    blood-glucose meter,continuous (DEXCOM G6 ) Misc 1 each by Misc.(Non-Drug; Combo Route) route continuous    blood-glucose sensor (DEXCOM G6 SENSOR) Leann Change sensor every 10 days    blood-glucose transmitter (DEXCOM G6 TRANSMITTER) Leann Use as directed    cilostazoL (PLETAL) 100 MG Tab Take 1 tablet (100 mg total) by mouth 2 (two) times daily.    dapagliflozin (FARXIGA) 10 mg tablet Take 1 tablet (10 mg total) by mouth once daily.    diclofenac sodium (VOLTAREN) 1 % Gel Apply 2 g topically 3 (three) times daily. Apply to the area of pain 2-3x per day or night as needed    dulaglutide (TRULICITY) 4.5 mg/0.5 mL pen injector Inject 4.5 mg into the skin every 7 days.    EScitalopram oxalate (LEXAPRO) 10 MG tablet Take 1 tablet (10 mg total) by  "mouth once daily.    eszopiclone (LUNESTA) 2 MG Tab Take 1 tablet (2 mg total) by mouth every evening.    evolocumab (REPATHA SURECLICK) 140 mg/mL PnIj Inject 1 mL (140 mg total) into the skin every 14 (fourteen) days.    hydroCHLOROthiazide (HYDRODIURIL) 12.5 MG Tab Take 1 tablet (12.5 mg total) by mouth once daily.    isosorbide mononitrate (ISMO,MONOKET) 10 mg tablet Take 1 tablet (10 mg total) by mouth once daily.    metoprolol tartrate (LOPRESSOR) 50 MG tablet TAKE 1 TABLET BY MOUTH TWICE A DAY    multivitamin (THERAGRAN) per tablet Take 1 tablet by mouth once daily.    pen needle, diabetic (NOVOTWIST) 32 gauge x 1/5" Ndle Use 1 needle to inject insulin four times daily    potassium chloride SA (K-DUR,KLOR-CON) 20 MEQ tablet Take 1 tablet (20 mEq total) by mouth 2 (two) times daily.    promethazine (PHENERGAN) 12.5 MG Tab Take 1 tablet (12.5 mg total) by mouth every 8 (eight) hours as needed (nausea).    telmisartan (MICARDIS) 80 MG Tab Take 1 tablet (80 mg total) by mouth once daily. Stop losartan    varicella-zoster gE-AS01B, PF, (SHINGRIX) 50 mcg/0.5 mL injection Inject into the muscle.   Last reviewed on 7/29/2022  9:37 AM by Renetta Castellanos PA-C    Review of patient's allergies indicates:   Allergen Reactions    Milk containing products      Causes GI distress    Last reviewed on  8/3/2022 8:38 AM by Sharlene Chavarria      Tasks added this encounter   8/31/2022 - Refill Call (Auto Added)   Tasks due within next 3 months   No tasks due.     [facility]  [facility]  [facility] [address]  Phone: [phone]  Fax: [phone]      "

## 2022-08-05 ENCOUNTER — TELEPHONE (OUTPATIENT)
Dept: NEUROSURGERY | Facility: CLINIC | Age: 62
End: 2022-08-05
Payer: COMMERCIAL

## 2022-08-05 NOTE — TELEPHONE ENCOUNTER
Please let her know I reviewed the MRI lspine and no new pinched nerves.  Radiologist read new compression fracture at T12 but I cannot see this clearly so I ordered MRI thoracic spine so please schedule that.    Ask if she has had any recent falls or if she has midback pain.    Thanks,  Renetta Castellnaos, [facility], PA-C  Neurosurgery  [facility]  08/05/2022

## 2022-08-08 ENCOUNTER — PATIENT MESSAGE (OUTPATIENT)
Dept: NEUROSURGERY | Facility: CLINIC | Age: 62
End: 2022-08-08
Payer: COMMERCIAL

## 2022-08-10 ENCOUNTER — OFFICE VISIT (OUTPATIENT)
Dept: CARDIOLOGY | Facility: CLINIC | Age: 62
End: 2022-08-10
Payer: COMMERCIAL

## 2022-08-10 VITALS
HEIGHT: 64 IN | HEART RATE: 84 BPM | SYSTOLIC BLOOD PRESSURE: 127 MMHG | BODY MASS INDEX: 27.03 KG/M2 | WEIGHT: 158.31 LBS | DIASTOLIC BLOOD PRESSURE: 67 MMHG

## 2022-08-10 DIAGNOSIS — I12.9 TYPE 2 DIABETES MELLITUS WITH STAGE 2 CHRONIC KIDNEY DISEASE AND HYPERTENSION: Chronic | ICD-10-CM

## 2022-08-10 DIAGNOSIS — I50.30 HEART FAILURE WITH PRESERVED EJECTION FRACTION, UNSPECIFIED HF CHRONICITY: Chronic | ICD-10-CM

## 2022-08-10 DIAGNOSIS — Z95.5 S/P CORONARY ARTERY STENT PLACEMENT: ICD-10-CM

## 2022-08-10 DIAGNOSIS — E11.22 TYPE 2 DIABETES MELLITUS WITH STAGE 2 CHRONIC KIDNEY DISEASE AND HYPERTENSION: Chronic | ICD-10-CM

## 2022-08-10 DIAGNOSIS — I25.2 HISTORY OF NON-ST ELEVATION MYOCARDIAL INFARCTION (NSTEMI): Chronic | ICD-10-CM

## 2022-08-10 DIAGNOSIS — I25.10 CORONARY ARTERY DISEASE INVOLVING NATIVE CORONARY ARTERY OF NATIVE HEART WITHOUT ANGINA PECTORIS: Chronic | ICD-10-CM

## 2022-08-10 DIAGNOSIS — I73.9 PAD (PERIPHERAL ARTERY DISEASE): ICD-10-CM

## 2022-08-10 DIAGNOSIS — N28.89 RENAL MASS, RIGHT: ICD-10-CM

## 2022-08-10 DIAGNOSIS — E78.5 HYPERLIPIDEMIA ASSOCIATED WITH TYPE 2 DIABETES MELLITUS: Chronic | ICD-10-CM

## 2022-08-10 DIAGNOSIS — E11.51 TYPE 2 DIABETES MELLITUS WITH DIABETIC PERIPHERAL ANGIOPATHY WITHOUT GANGRENE, WITHOUT LONG-TERM CURRENT USE OF INSULIN: Chronic | ICD-10-CM

## 2022-08-10 DIAGNOSIS — N18.2 TYPE 2 DIABETES MELLITUS WITH STAGE 2 CHRONIC KIDNEY DISEASE AND HYPERTENSION: Chronic | ICD-10-CM

## 2022-08-10 DIAGNOSIS — I73.9 CLAUDICATION OF BOTH LOWER EXTREMITIES: Primary | ICD-10-CM

## 2022-08-10 DIAGNOSIS — E11.69 HYPERLIPIDEMIA ASSOCIATED WITH TYPE 2 DIABETES MELLITUS: Chronic | ICD-10-CM

## 2022-08-10 DIAGNOSIS — M51.26 LUMBAR HERNIATED DISC: Chronic | ICD-10-CM

## 2022-08-10 DIAGNOSIS — I65.23 CAROTID STENOSIS, BILATERAL: Chronic | ICD-10-CM

## 2022-08-10 DIAGNOSIS — M48.54XA NONTRAUMATIC COMPRESSION FRACTURE OF T12 VERTEBRA, INITIAL ENCOUNTER: ICD-10-CM

## 2022-08-10 DIAGNOSIS — I15.2 HYPERTENSION ASSOCIATED WITH DIABETES: Chronic | ICD-10-CM

## 2022-08-10 DIAGNOSIS — R29.818 NEUROGENIC CLAUDICATION: Chronic | ICD-10-CM

## 2022-08-10 DIAGNOSIS — E11.59 HYPERTENSION ASSOCIATED WITH DIABETES: Chronic | ICD-10-CM

## 2022-08-10 PROBLEM — M48.50XA COMPRESSION FRACTURE OF SPINE: Status: ACTIVE | Noted: 2022-08-10

## 2022-08-10 PROCEDURE — 3052F HG A1C>EQUAL 8.0%<EQUAL 9.0%: CPT | Mod: CPTII,S$GLB,, | Performed by: INTERNAL MEDICINE

## 2022-08-10 PROCEDURE — 99999 PR PBB SHADOW E&M-EST. PATIENT-LVL V: ICD-10-PCS | Mod: PBBFAC,,, | Performed by: INTERNAL MEDICINE

## 2022-08-10 PROCEDURE — 3008F BODY MASS INDEX DOCD: CPT | Mod: CPTII,S$GLB,, | Performed by: INTERNAL MEDICINE

## 2022-08-10 PROCEDURE — 3074F SYST BP LT 130 MM HG: CPT | Mod: CPTII,S$GLB,, | Performed by: INTERNAL MEDICINE

## 2022-08-10 PROCEDURE — 99999 PR PBB SHADOW E&M-EST. PATIENT-LVL V: CPT | Mod: PBBFAC,,, | Performed by: INTERNAL MEDICINE

## 2022-08-10 PROCEDURE — 3074F PR MOST RECENT SYSTOLIC BLOOD PRESSURE < 130 MM HG: ICD-10-PCS | Mod: CPTII,S$GLB,, | Performed by: INTERNAL MEDICINE

## 2022-08-10 PROCEDURE — 4010F ACE/ARB THERAPY RXD/TAKEN: CPT | Mod: CPTII,S$GLB,, | Performed by: INTERNAL MEDICINE

## 2022-08-10 PROCEDURE — 3078F DIAST BP <80 MM HG: CPT | Mod: CPTII,S$GLB,, | Performed by: INTERNAL MEDICINE

## 2022-08-10 PROCEDURE — 1159F PR MEDICATION LIST DOCUMENTED IN MEDICAL RECORD: ICD-10-PCS | Mod: CPTII,S$GLB,, | Performed by: INTERNAL MEDICINE

## 2022-08-10 PROCEDURE — 3052F PR MOST RECENT HEMOGLOBIN A1C LEVEL 8.0 - < 9.0%: ICD-10-PCS | Mod: CPTII,S$GLB,, | Performed by: INTERNAL MEDICINE

## 2022-08-10 PROCEDURE — 99215 OFFICE O/P EST HI 40 MIN: CPT | Mod: S$GLB,,, | Performed by: INTERNAL MEDICINE

## 2022-08-10 PROCEDURE — 3078F PR MOST RECENT DIASTOLIC BLOOD PRESSURE < 80 MM HG: ICD-10-PCS | Mod: CPTII,S$GLB,, | Performed by: INTERNAL MEDICINE

## 2022-08-10 PROCEDURE — 3072F PR LOW RISK FOR RETINOPATHY: ICD-10-PCS | Mod: CPTII,S$GLB,, | Performed by: INTERNAL MEDICINE

## 2022-08-10 PROCEDURE — 99215 PR OFFICE/OUTPT VISIT, EST, LEVL V, 40-54 MIN: ICD-10-PCS | Mod: S$GLB,,, | Performed by: INTERNAL MEDICINE

## 2022-08-10 PROCEDURE — 3008F PR BODY MASS INDEX (BMI) DOCUMENTED: ICD-10-PCS | Mod: CPTII,S$GLB,, | Performed by: INTERNAL MEDICINE

## 2022-08-10 PROCEDURE — 1159F MED LIST DOCD IN RCRD: CPT | Mod: CPTII,S$GLB,, | Performed by: INTERNAL MEDICINE

## 2022-08-10 PROCEDURE — 4010F PR ACE/ARB THEARPY RXD/TAKEN: ICD-10-PCS | Mod: CPTII,S$GLB,, | Performed by: INTERNAL MEDICINE

## 2022-08-10 PROCEDURE — 3072F LOW RISK FOR RETINOPATHY: CPT | Mod: CPTII,S$GLB,, | Performed by: INTERNAL MEDICINE

## 2022-08-10 NOTE — PATIENT INSTRUCTIONS
Assessment/Plan:  Mariann Huff is a 62 y.o. female with CAD s/p stents, HTN, HLD, DM2, MURTAZA (not on CPAP), who presents for a follow up appointment.     1. PAD- Mrs. Huff presents with [place] stage 1-2 claudication symptoms.  Symptoms have persisted despite maximal medical therapy with cilostazol 100 mg bid, ASA 81 mg daily, and atorvastatin 40 mg daily.  She has no rest pain or tissue loss to suggest CLI.  She also has lumbar degenerative disc disease, which is contributing to her claudication symptoms.  MRI Lumbar Spine on 8/3/2022 revealed acute T12 compression fracture with mild depression of the superior endplate.  No significant retropulsion  Postoperative change of L5-S1 interbody and posterior instrumented fusion.  No evidence for complication.  Given continued symptoms despite maximal medical therapy for PAD, will pursue BLE angio/intervention.   Continue aspirin and high-intensity statin.  Continue walking program goal of at least 30 minutes per day, 5 days per week.      2. HTN- Continue current medications.    3. HLD- LDL is at goal of <70.  Continue ASA and high intensity statin.    4. MURTAZA- Continue CPAP.    5. Overweight- Encourage diet, exercise and weight loss.     6. Lumbar Disc Disease with Compression Fracture- MRI Lumbar Spine on 8/3/2022 revealed acute T12 compression fracture with mild depression of the superior endplate.  No significant retropulsion  Postoperative change of L5-S1 interbody and posterior instrumented fusion.  No evidence for complication.  Refer to Neurosurgery for evaluation.     7. Nonspecific signal heterogeneity of the bilateral renal parenchyma- Found on CTA abd/pelvis.  Refer to Urology for evaluation.     Follow up in 2 months

## 2022-08-10 NOTE — PROGRESS NOTES
[facility]      Chief Complaint   Patient presents with    PAD (peripheral artery disease)       Patient ID: Mariann Huff is a 62 y.o. female with CAD s/p stents, HTN, HLD, DM2, MURTAZA (not on CPAP), who presents for a follow up appointment.  Pertinent history/events are as follows:     -Pt kindly referred by Dr. Aguilar for evaluation of PAD/bilateral carotid stenosis.     -At our initial clinic visit on 5/2/2022: Mrs. Huff reports pain in both calves after walking 3 blocks, which improves with rest.  No rest pain or tissue loss.  She also reports chronic back pain.  Segmental Pressure Study on 5/2/2022 revealed right resting NIKKI 0.86, suggestive of mild right lower extremity arterial disease.  Left resting NIKKI 0.67, suggestive of moderate left lower extremity arterial disease.  PVR waveforms are mildly dampened at the ankle level bilaterally, and at the digit level on the left.  Segmental pressures are uninterpretable due to noncompressible thigh pressures.  Carotid Ultrasound on 5/2/2022 revealed less than 50% right and left Internal Carotid Stenosis.  There is less than 50% left Internal Carotid Stenosis.  Plan:   PAD- Mrs. Huff presents with Erasmo stage 1-2 claudication symptoms.  She has no rest pain or tissue loss to suggest CLI.  She likely also has lumbar degenerative disc disease, which is contributing to a claudication symptoms.  Check MRI lumbar spine and CTA abd/pelvis with iliofemoral runoff.  Start cilostazol 100 mg bid.  Continue aspirin and high-intensity statin.  Pt to start walking program goal of at least 30 minutes per day, 5 days per week.    HTN- Continue current medications.  HLD- LDL is at goal of <70.  Continue ASA and high intensity statin.  MURTAZA- Continue CPAP.  Overweight- Encourage diet, exercise and weight loss.     HPI:  Mrs. Huff reports no improvement in claudication symptoms since starting cilostazol 100 mg bid.  CTA Abd/Pelvis with Iliofemoral Runoff on  8/3/2022 revealed on the right the right common femoral artery is patent with moderate narrowing.  The right superficial femoral artery demonstrates multifocal moderate to severe narrowing.  The right popliteal artery demonstrates moderate narrowing.  The anterior tibial and peroneal arteries are patent proximally.  The distal peroneal artery not well opacified and likely with severe atherosclerotic disease.  The posterior tibial artery is patent at the origin however there is likely severe atherosclerotic disease throughout its course.  The dominant supply to the right foot appears to be the anterior tibial artery.  On the left, the left superficial femoral artery demonstrates moderate to severe multifocal narrowing.  Left deep femoral artery demonstrates mild-to-moderate narrowing..  Left popliteal artery demonstrates multifocal moderate to severe narrowing.  There is moderate - severe atherosclerosis of the left calf vessels however they appear patent to the feet.  MRI Lumbar Spine on 8/3/2022 revealed acute T12 compression fracture with mild depression of the superior endplate.  No significant retropulsion  Postoperative change of L5-S1 interbody and posterior instrumented fusion.  No evidence for complication.    Past Medical History:   Diagnosis Date    Anticoagulant long-term use     Asthma     Back pain     Bradycardia, unspecified 5/8/2019    The etiology of the bradycardic episode is unclear.  The have appear to be respiratory in origin (at least the 1st episode).  We will continue to monitor carefully.  We are awaiting evaluation by Cardiology.     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, with long-term current use of insulin 2020     Past Surgical History:   Procedure Laterality Date    ANTEGRADE NEPHROSTOGRAPHY Left 2019    Procedure: Nephrostogram - antegrade;  Surgeon: Robin Boyd MD;  [facility];  Service: Urology;  Laterality: Left;    BRONCHOSCOPY N/A 2019    Procedure: BRONCHOSCOPY;  Surgeon: Sean Ruano MD;  [facility];  Service: General;  Laterality: N/A;    CARDIAC CATHETERIZATION      CATARACT EXTRACTION      cataract extraction left eye      cataracts      CAUDAL EPIDURAL STEROID INJECTION N/A 2019    Procedure: Injection-steroid-epidural-caudal;  Surgeon: Dave Bentley Jr., MD;  Location: [facility] PAIN MGT;  Service: Pain Management;  Laterality: N/A;     SECTION, LOW TRANSVERSE      COLONOSCOPY N/A 2019    Procedure: COLONOSCOPY;  Surgeon: Al Alaniz MD;  Location: [facility] ENDO (2ND FLR);  Service: Endoscopy;  Laterality: N/A;    CORONARY ANGIOPLASTY      CYSTOGRAM N/A 2019    Procedure: CYSTOGRAM INTRAOP;  Surgeon: Robin Boyd MD;  [facility];  Service: Urology;  Laterality: N/A;  1 HOUR    CYSTOSCOPY W/ RETROGRADES Left 2019    Procedure: CYSTOSCOPY, WITH RETROGRADE PYELOGRAM;  Surgeon: Robin Boyd MD;  [address]  Service: Urology;  Laterality: Left;  fluro    ESOPHAGOGASTRODUODENOSCOPY W/ PEG  2019    Procedure: EGD, WITH PEG TUBE INSERTION;  Surgeon: Sean Ruano MD;  [facility];  Service: General;;    EXCISION TURBINATE, SUBMUCOUS      FLEXIBLE SIGMOIDOSCOPY N/A 2019    Procedure: SIGMOIDOSCOPY, FLEXIBLE;  Surgeon: ALBERTO Amin MD;  [facility]  Service: Endoscopy;  Laterality: N/A;    FLEXIBLE SIGMOIDOSCOPY N/A 5/21/2019    Procedure: SIGMOIDOSCOPY, FLEXIBLE;  Surgeon: ALBERTO Amin MD;  [facility]  Service: Endoscopy;  Laterality: N/A;    FUSION OF LUMBAR SPINE BY ANTERIOR APPROACH Left 4/12/2019    Procedure: FUSION, SPINE, LUMBAR, ANTERIOR APPROACH Left L5-S1 Anterior to Psoa Interbody Fusion, L5-S1 Posterior Instrumentation;  Surgeon: Mk George MD;  Location: [address];  Service: Neurosurgery;  Laterality: Left;  Porcedure:  Left L5-S1 Anterior to Psoa Interbody Fusion, L5-S1 Posterior Instrumentation  Surgery Time: 4 Hrs  LOS: 2-3  Anesthesia: General   Blood: Type & Screen  R    HAND SURGERY Left     HAND SURGERY Right     torn ligament repair/ Dr. Yeboah    HYSTERECTOMY      INJECTION OF STEROID Right 12/10/2020    Procedure: INJECTION, STEROID Right SI Joint Block and Steroid Injection;  Surgeon: Mk George MD;  Location: [facility];  Service: Neurosurgery;  Laterality: Right;  Procedure: Right SI Joint Block and Steroid Injection   SUrgery Time: 30 Min  LOS: 0  Anesthesia: MAC  Radiology: C-arm  Bed: [place] [id number] Poster  Position: Prone    INJECTION OF STEROID Right 9/28/2021    Procedure: INJECTION, STEROID Right SI joint block & steroid injection;  Surgeon: Mk George MD;  Location: [facility];  Service: Neurosurgery;  Laterality: Right;  Procedure: Right SI joint block & steroid injection  Surgery Time: 30m  Anesthesia: Local MAC  Radiology: C-arm  Bed: Regular  Position: Prone    left foot surgery      left wrist surgery      LYSIS OF ADHESIONS N/A 2/19/2020    Procedure: LYSIS, ADHESIONS;  Surgeon: Robin Boyd MD;  Location: [address];  Service: Urology;  Laterality: N/A;    NASAL SEPTUM SURGERY  5/7/15    PERCUTANEOUS NEPHROSTOMY Left 4/21/2019    Procedure: Creation, Nephrostomy, Percutaneous;  Surgeon: Karina Surgeon;  Location: [facility];  Service:  Anesthesiology;  Laterality: Left;    REPAIR OF URETER  4/12/2019    Procedure: REPAIR, URETER;  Surgeon: Mk George MD;  [address]  Service: Neurosurgery;;    REPLACEMENT OF NEPHROSTOMY TUBE N/A 7/18/2019    Procedure: REPLACEMENT, NEPHROSTOMY TUBE;  Surgeon: [facility];  [facility];  Service: Anesthesiology;  Laterality: N/A;  188    REPLACEMENT OF NEPHROSTOMY TUBE N/A 7/24/2019    Procedure: REPLACEMENT, NEPHROSTOMY TUBE;  Surgeon: [facility];  [facility];  Service: Anesthesiology;  Laterality: N/A;  188    REPLACEMENT OF NEPHROSTOMY TUBE N/A 10/7/2019    Procedure: REPLACEMENT, NEPHROSTOMY TUBE;  Surgeon: [facility];  [facility];  Service: Anesthesiology;  Laterality: N/A;  189    REPLACEMENT OF NEPHROSTOMY TUBE N/A 11/25/2019    Procedure: REPLACEMENT, NEPHROSTOMY TUBE;  Surgeon: [facility];  [facility];  Service: Anesthesiology;  Laterality: N/A;  Room 188/Bessy    REPLACEMENT OF NEPHROSTOMY TUBE Right 2/19/2020    Procedure: REPLACEMENT, NEPHROSTOMY TUBE removal removal;  Surgeon: Robin Boyd MD;  Location[address];  Service: Urology;  Laterality: Right;    rt elbow surgery      S/P LAD COATED STENT  05/14/2010    6 total     S/P OM1 STENT  08/2003    SINUS SURGERY      F.E.S.S.    TRACHEOSTOMY N/A 5/2/2019    Procedure: CREATION, TRACHEOSTOMY;  Surgeon: Sean Ruano MD;  [address];  Service: General;  Laterality: N/A;    TUBAL LIGATION      URETERAL REIMPLANTION Left 2/19/2020    Procedure: REIMPLANTATION, URETER WITH PSOAS HITCH;  Surgeon: Robin Boyd MD;  Location: [address];  Service: Urology;  Laterality: Left;     Social History     Socioeconomic History    Marital status:      Spouse name: Shamir    Number of children: 2   Occupational History    Occupation: [employer]     Employer: [employer]      Employer: [employer]     Employer: [email or web address]   Tobacco Use    Smoking status: Never Smoker    Smokeless tobacco: Never Used   Substance and Sexual Activity    Alcohol use: No     Alcohol/week: 0.0 standard drinks    Drug use: No    Sexual activity: Yes     Partners: Male     Birth control/protection: Post-menopausal     Comment:    Other Topics Concern    Are you pregnant or think you may be? No    Breast-feeding No   Social History Narrative    . 2 children.      Social Determinants of Health     Physical Activity: Unknown    Days of Exercise per Week: 1 day   Stress: Stress Concern Present    Feeling of Stress : Rather much   Social Connections: Unknown    Frequency of Communication with Friends and Family: Three times a week    Frequency of Social Gatherings with Friends and Family: Once a week    Active Member of Clubs or Organizations: No    Attends Club or Organization Meetings: Patient refused    Marital Status:      Family History   Problem Relation Age of Onset    Diabetes Mother     Heart disease Mother     Diabetes Father     Leukemia Father         leukemia    Heart attack Father     Diabetes Sister     Diabetes Brother     Diabetes Sister     No Known Problems Sister     No Known Problems Brother     No Known Problems Brother     No Known Problems Maternal Grandmother     No Known Problems Maternal Grandfather     No Known Problems Paternal Grandmother     No Known Problems Paternal Grandfather     No Known Problems Son     No Known Problems Son     No Known Problems Maternal Aunt     No Known Problems Maternal Uncle     No Known Problems Paternal Aunt     No Known Problems Paternal Uncle     Colon cancer Neg Hx     Inflammatory bowel disease Neg Hx     Melanoma Neg Hx     Psoriasis Neg Hx     Lupus Neg Hx     Eczema Neg Hx     Acne Neg Hx     Amblyopia Neg Hx     Blindness Neg Hx     Cancer Neg Hx      Cataracts Neg Hx     Glaucoma Neg Hx     Hypertension Neg Hx     Macular degeneration Neg Hx     Retinal detachment Neg Hx     Strabismus Neg Hx     Stroke Neg Hx     Thyroid disease Neg Hx     Heart failure Neg Hx     Hyperlipidemia Neg Hx        Review of patient's allergies indicates:   Allergen Reactions    Milk containing products      Causes GI distress       Medication List with Changes/Refills   Current Medications    ACCU-CHEK EDIN PLUS METER MISC        ALBUTEROL (PROVENTIL/VENTOLIN HFA) 90 MCG/ACTUATION INHALER    Inhale 2 puffs into the lungs every 6 (six) hours as needed for Wheezing.    ALBUTEROL-IPRATROPIUM (DUO-NEB) 2.5 MG-0.5 MG/3 ML NEBULIZER SOLUTION    Inhale 3 mLs into the lungs.    AMLODIPINE (NORVASC) 10 MG TABLET    Take 1 tablet (10 mg total) by mouth once daily.    ASPIRIN 81 MG TAB    Take 81 mg by mouth. 1 Tablet Oral Every day    ATORVASTATIN (LIPITOR) 40 MG TABLET    Take 1 tablet (40 mg total) by mouth every evening.    BLOOD SUGAR DIAGNOSTIC (ACCU-CHEK EDIN PLUS TEST STRP) STRP    TEST BLOOD SUGARS 4 TIMES DAILY    CILOSTAZOL (PLETAL) 100 MG TAB    Take 1 tablet (100 mg total) by mouth 2 (two) times daily.    DAPAGLIFLOZIN (FARXIGA) 10 MG TABLET    Take 1 tablet (10 mg total) by mouth once daily.    DICLOFENAC SODIUM (VOLTAREN) 1 % GEL    Apply 2 g topically 3 (three) times daily. Apply to the area of pain 2-3x per day or night as needed    DULAGLUTIDE (TRULICITY) 4.5 MG/0.5 ML PEN INJECTOR    Inject 4.5 mg into the skin every 7 days.    ESCITALOPRAM OXALATE (LEXAPRO) 10 MG TABLET    Take 1 tablet (10 mg total) by mouth once daily.    ESZOPICLONE (LUNESTA) 2 MG TAB    Take 1 tablet (2 mg total) by mouth every evening.    EVOLOCUMAB (REPATHA SURECLICK) 140 MG/ML PNIJ    Inject 1 mL (140 mg total) into the skin every 14 (fourteen) days.    HYDROCHLOROTHIAZIDE (HYDRODIURIL) 12.5 MG TAB    Take 1 tablet (12.5 mg total) by mouth once daily.    ISOSORBIDE MONONITRATE  "(ISMO,MONOKET) 10 MG TABLET    Take 1 tablet (10 mg total) by mouth once daily.    METOPROLOL TARTRATE (LOPRESSOR) 50 MG TABLET    TAKE 1 TABLET BY MOUTH TWICE A DAY    MULTIVITAMIN (THERAGRAN) PER TABLET    Take 1 tablet by mouth once daily.    PEN NEEDLE, DIABETIC (NOVOTWIST) 32 GAUGE X 1/5" NDLE    Use 1 needle to inject insulin four times daily    POTASSIUM CHLORIDE SA (K-DUR,KLOR-CON) 20 MEQ TABLET    Take 1 tablet (20 mEq total) by mouth 2 (two) times daily.    PROMETHAZINE (PHENERGAN) 12.5 MG TAB    Take 1 tablet (12.5 mg total) by mouth every 8 (eight) hours as needed (nausea).    TELMISARTAN (MICARDIS) 80 MG TAB    Take 1 tablet (80 mg total) by mouth once daily. Stop losartan    VARICELLA-ZOSTER GE-AS01B, PF, (SHINGRIX) 50 MCG/0.5 ML INJECTION    Inject into the muscle.   Discontinued Medications    BLOOD-GLUCOSE METER,CONTINUOUS (DEXCOM G6 ) MISC    1 each by Misc.(Non-Drug; Combo Route) route continuous    BLOOD-GLUCOSE SENSOR (DEXCOM G6 SENSOR) MICHAEL    Change sensor every 10 days    BLOOD-GLUCOSE TRANSMITTER (DEXCOM G6 TRANSMITTER) MICHAEL    Use as directed       Review of Systems  Constitution: Denies chills, fever, and sweats.  HENT: Denies headaches or blurry vision.  Cardiovascular: Denies chest pain or irregular heart beat.  Respiratory: Denies cough or shortness of breath.  Gastrointestinal: Denies abdominal pain, nausea, or vomiting.  Musculoskeletal: Denies muscle cramps.  Neurological: Denies dizziness or focal weakness.  Psychiatric/Behavioral: Normal mental status.  Hematologic/Lymphatic: Denies bleeding problem or easy bruising/bleeding.  Skin: Denies rash or suspicious lesions    Physical Examination  /67   Pulse 84   Ht 5' 4" (1.626 m)   Wt 71.8 kg (158 lb 4.6 oz)   LMP  (LMP Unknown)   BMI 27.17 kg/m²     Constitutional: No acute distress, conversant  HEENT: Sclera anicteric, Pupils equal, round and reactive to light, extraocular motions intact, Oropharynx clear  Neck: No " JVD, no carotid bruits  Cardiovascular: regular rate and rhythm, no murmur, rubs or gallops, normal S1/S2  Pulmonary: Clear to auscultation bilaterally  Abdominal: Abdomen soft, nontender, nondistended, positive bowel sounds  Extremities: No lower extremity edema,   Pulses:  Carotid pulses are 2+ on the right side, and 2+ on the left side.  Radial pulses are 2+ on the right side, and 2+ on the left side.   Femoral pulses are 2+ on the right side, and 2+ on the left side.  Popliteal pulses are 2+ on the right side, and 2+ on the left side.   Dorsalis pedis pulses are 2+ on the right side, and 2+ on the left side.   Posterior tibial pulses are 2+ on the right side, and 2+ on the left side.    Skin: No ecchymosis, erythema, or ulcers  Psych: Alert and oriented x 3, appropriate affect  Neuro: CNII-XII intact, no focal deficits    Labs:  Most Recent Data  CBC:   Lab Results   Component Value Date    WBC 6.57 02/25/2022    HGB 11.9 (L) 02/25/2022    HCT 39.1 02/25/2022     02/25/2022    MCV 88 02/25/2022    RDW 13.9 02/25/2022     BMP:   Lab Results   Component Value Date     06/14/2022    K 4.2 06/14/2022     06/14/2022    CO2 29 06/14/2022    BUN 23 (H) 06/14/2022    CREATININE 1.10 06/14/2022     (H) 06/14/2022    CALCIUM 9.6 06/14/2022    MG 2.0 08/20/2020    PHOS 3.9 07/01/2019     LFTS;   Lab Results   Component Value Date    PROT 8.0 06/14/2022    ALBUMIN 4.6 06/14/2022    BILITOT 1.2 (H) 06/14/2022    AST 29 06/14/2022    ALKPHOS 144 (H) 06/14/2022    ALT 22 06/14/2022     COAGS:   Lab Results   Component Value Date    INR 0.9 11/25/2019     FLP:   Lab Results   Component Value Date    CHOL 114 (L) 06/14/2022    HDL 46 06/14/2022    LDLCALC 42.4 (L) 06/14/2022    TRIG 128 06/14/2022    CHOLHDL 40.4 06/14/2022     CARDIAC:   Lab Results   Component Value Date    TROPONINI 3.344 (H) 06/07/2019    BNP 1,403 (H) 06/06/2019       Imaging:    MRI Lumbar Spine 8/3/2022:  1. Acute T12 compression  fracture with mild depression of the superior endplate.  No significant retropulsion.  2. Postoperative change of L5-S1 interbody and posterior instrumented fusion.  No evidence for complication.  3. Degenerative changes as described above.  4. Nonspecific signal heterogeneity of the bilateral renal parenchyma.  Recommend further evaluation with dedicated retroperitoneal ultrasound.  5. Mild left hydroureter, possibly related to retroperitoneal scar tissue the region of prior collection.  This report was flagged in Epic as abnormal.    CTA Abd/Pelvis with Iliofemoral Runoff 8/3/2022:  Right lower extremity:   The right common femoral artery is patent with moderate narrowing.  The right superficial femoral artery demonstrates multifocal  moderate to severe narrowing.  The right deep femoral artery demonstrates no hemodynamically significant stenosis.  The right popliteal artery demonstrates  moderate narrowing.  The anterior tibial and peroneal arteries are patent proximally.  The distal peroneal artery not well opacified and likely with severe atherosclerotic disease.  The posterior tibial artery is patent at the origin however there is likely severe atherosclerotic disease throughout its course.  The dominant supply to the right foot appears to be the anterior tibial artery.      Left lower extremity:   Left common femoral artery is patent.  Left superficial femoral artery demonstrates moderate to severe multifocal narrowing.  Left deep femoral artery demonstrates mild-to-moderate narrowing..  Left popliteal artery demonstrates multifocal moderate to severe narrowing.  Patent origins of the left calf vessels.  There is moderate - severe atherosclerosis of the left calf vessels however they appear patent to the feet.    Segmental Pressure Study 5/2/2022:  · Right resting NIKKI 0.86, is suggestive of mild right lower extremity arterial disease.  · Left resting NIKKI 0.67, suggestive of moderate left lower extremity  arterial disease.  · PVR waveforms are mildly dampened at the ankle level bilaterally, and at the digit level on the left.  · Segmental pressures are uninterpretable due to noncompressible thigh pressures.    Carotid Ultrasound:  · There is less than 50% right Internal Carotid Stenosis.  · There is less than 50% left Internal Carotid Stenosis.    Assessment/Plan:  Mariann Huff is a 62 y.o. female with CAD s/p stents, HTN, HLD, DM2, MURTAZA (not on CPAP), who presents for a follow up appointment.     1. PAD- Mrs. Huff presents with Navarro stage 1-2 claudication symptoms.  Symptoms have persisted despite maximal medical therapy with cilostazol 100 mg bid, ASA 81 mg daily, and atorvastatin 40 mg daily.  She has no rest pain or tissue loss to suggest CLI.  She also has lumbar degenerative disc disease, which is contributing to her claudication symptoms.  MRI Lumbar Spine on 8/3/2022 revealed acute T12 compression fracture with mild depression of the superior endplate.  No significant retropulsion  Postoperative change of L5-S1 interbody and posterior instrumented fusion.  No evidence for complication.  Given continued symptoms despite maximal medical therapy for PAD, will pursue BLE angio/intervention.   Continue aspirin and high-intensity statin.  Continue walking program goal of at least 30 minutes per day, 5 days per week.      2. HTN- Continue current medications.    3. HLD- LDL is at goal of <70.  Continue ASA and high intensity statin.    4. MURTAZA- Continue CPAP.    5. Overweight- Encourage diet, exercise and weight loss.     6. Lumbar Disc Disease with Compression Fracture- MRI Lumbar Spine on 8/3/2022 revealed acute T12 compression fracture with mild depression of the superior endplate.  No significant retropulsion  Postoperative change of L5-S1 interbody and posterior instrumented fusion.  No evidence for complication.  Refer to Neurosurgery for evaluation.     7. Nonspecific signal heterogeneity of the  bilateral renal parenchyma- Found on CTA abd/pelvis.  Refer to Urology for evaluation.     Follow up in 3 months    Total duration of face to face visit time 30 minutes.  Total time spent counseling greater than fifty percent of total visit time.  Counseling included discussion regarding imaging findings, diagnosis, possibilities, treatment options, risks and benefits.  The patient had many questions regarding the options and long-term effects.    Gabino Fry MD, PhD  Interventional Cardiology

## 2022-08-18 ENCOUNTER — PES CALL (OUTPATIENT)
Dept: ADMINISTRATIVE | Facility: CLINIC | Age: 62
End: 2022-08-18
Payer: COMMERCIAL

## 2022-08-23 ENCOUNTER — OFFICE VISIT (OUTPATIENT)
Dept: INTERNAL MEDICINE | Facility: CLINIC | Age: 62
End: 2022-08-23
Payer: COMMERCIAL

## 2022-08-23 ENCOUNTER — LAB VISIT (OUTPATIENT)
Dept: LAB | Facility: HOSPITAL | Age: 62
End: 2022-08-23
Attending: INTERNAL MEDICINE
Payer: COMMERCIAL

## 2022-08-23 VITALS
SYSTOLIC BLOOD PRESSURE: 159 MMHG | HEIGHT: 64 IN | BODY MASS INDEX: 27.21 KG/M2 | TEMPERATURE: 98 F | HEART RATE: 70 BPM | OXYGEN SATURATION: 97 % | WEIGHT: 159.38 LBS | RESPIRATION RATE: 18 BRPM | DIASTOLIC BLOOD PRESSURE: 81 MMHG

## 2022-08-23 DIAGNOSIS — E11.59 HYPERTENSION ASSOCIATED WITH DIABETES: ICD-10-CM

## 2022-08-23 DIAGNOSIS — R94.31 ABNORMAL EKG: ICD-10-CM

## 2022-08-23 DIAGNOSIS — R55 SYNCOPE, UNSPECIFIED SYNCOPE TYPE: Primary | ICD-10-CM

## 2022-08-23 DIAGNOSIS — I15.2 HYPERTENSION ASSOCIATED WITH DIABETES: ICD-10-CM

## 2022-08-23 DIAGNOSIS — N18.31 CHRONIC KIDNEY DISEASE, STAGE 3A: ICD-10-CM

## 2022-08-23 PROBLEM — A41.1: Status: ACTIVE | Noted: 2019-06-05

## 2022-08-23 PROBLEM — M43.26 FUSION OF SPINE, LUMBAR REGION: Status: ACTIVE | Noted: 2019-02-06

## 2022-08-23 LAB
ALBUMIN SERPL BCP-MCNC: 4.4 G/DL (ref 3.5–5.2)
ALP SERPL-CCNC: 127 U/L (ref 55–135)
ALT SERPL W/O P-5'-P-CCNC: 17 U/L (ref 10–44)
ANION GAP SERPL CALC-SCNC: 9 MMOL/L (ref 8–16)
AST SERPL-CCNC: 16 U/L (ref 10–40)
BASOPHILS # BLD AUTO: 0.05 K/UL (ref 0–0.2)
BASOPHILS NFR BLD: 0.6 % (ref 0–1.9)
BILIRUB SERPL-MCNC: 1.3 MG/DL (ref 0.1–1)
BUN SERPL-MCNC: 23 MG/DL (ref 8–23)
CALCIUM SERPL-MCNC: 10 MG/DL (ref 8.7–10.5)
CHLORIDE SERPL-SCNC: 106 MMOL/L (ref 95–110)
CO2 SERPL-SCNC: 28 MMOL/L (ref 23–29)
CREAT SERPL-MCNC: 1.2 MG/DL (ref 0.5–1.4)
DIFFERENTIAL METHOD: ABNORMAL
EOSINOPHIL # BLD AUTO: 0.2 K/UL (ref 0–0.5)
EOSINOPHIL NFR BLD: 2.8 % (ref 0–8)
ERYTHROCYTE [DISTWIDTH] IN BLOOD BY AUTOMATED COUNT: 13.7 % (ref 11.5–14.5)
EST. GFR  (NO RACE VARIABLE): 51.5 ML/MIN/1.73 M^2
GLUCOSE SERPL-MCNC: 190 MG/DL (ref 70–110)
HCT VFR BLD AUTO: 41.2 % (ref 37–48.5)
HGB BLD-MCNC: 12.6 G/DL (ref 12–16)
IMM GRANULOCYTES # BLD AUTO: 0.04 K/UL (ref 0–0.04)
IMM GRANULOCYTES NFR BLD AUTO: 0.5 % (ref 0–0.5)
LYMPHOCYTES # BLD AUTO: 2.8 K/UL (ref 1–4.8)
LYMPHOCYTES NFR BLD: 32.8 % (ref 18–48)
MCH RBC QN AUTO: 27.2 PG (ref 27–31)
MCHC RBC AUTO-ENTMCNC: 30.6 G/DL (ref 32–36)
MCV RBC AUTO: 89 FL (ref 82–98)
MONOCYTES # BLD AUTO: 0.7 K/UL (ref 0.3–1)
MONOCYTES NFR BLD: 7.6 % (ref 4–15)
NEUTROPHILS # BLD AUTO: 4.8 K/UL (ref 1.8–7.7)
NEUTROPHILS NFR BLD: 55.7 % (ref 38–73)
NRBC BLD-RTO: 0 /100 WBC
PLATELET # BLD AUTO: 301 K/UL (ref 150–450)
PMV BLD AUTO: 11.2 FL (ref 9.2–12.9)
POTASSIUM SERPL-SCNC: 4.6 MMOL/L (ref 3.5–5.1)
PROT SERPL-MCNC: 7.9 G/DL (ref 6–8.4)
RBC # BLD AUTO: 4.63 M/UL (ref 4–5.4)
SODIUM SERPL-SCNC: 143 MMOL/L (ref 136–145)
WBC # BLD AUTO: 8.59 K/UL (ref 3.9–12.7)

## 2022-08-23 PROCEDURE — 3008F BODY MASS INDEX DOCD: CPT | Mod: CPTII,S$GLB,, | Performed by: INTERNAL MEDICINE

## 2022-08-23 PROCEDURE — 3077F PR MOST RECENT SYSTOLIC BLOOD PRESSURE >= 140 MM HG: ICD-10-PCS | Mod: CPTII,S$GLB,, | Performed by: INTERNAL MEDICINE

## 2022-08-23 PROCEDURE — 99214 PR OFFICE/OUTPT VISIT, EST, LEVL IV, 30-39 MIN: ICD-10-PCS | Mod: S$GLB,,, | Performed by: INTERNAL MEDICINE

## 2022-08-23 PROCEDURE — 36415 COLL VENOUS BLD VENIPUNCTURE: CPT | Mod: PO | Performed by: INTERNAL MEDICINE

## 2022-08-23 PROCEDURE — 3052F HG A1C>EQUAL 8.0%<EQUAL 9.0%: CPT | Mod: CPTII,S$GLB,, | Performed by: INTERNAL MEDICINE

## 2022-08-23 PROCEDURE — 93010 EKG 12-LEAD: ICD-10-PCS | Mod: S$GLB,,, | Performed by: INTERNAL MEDICINE

## 2022-08-23 PROCEDURE — 3072F PR LOW RISK FOR RETINOPATHY: ICD-10-PCS | Mod: CPTII,S$GLB,, | Performed by: INTERNAL MEDICINE

## 2022-08-23 PROCEDURE — 1159F PR MEDICATION LIST DOCUMENTED IN MEDICAL RECORD: ICD-10-PCS | Mod: CPTII,S$GLB,, | Performed by: INTERNAL MEDICINE

## 2022-08-23 PROCEDURE — 1160F RVW MEDS BY RX/DR IN RCRD: CPT | Mod: CPTII,S$GLB,, | Performed by: INTERNAL MEDICINE

## 2022-08-23 PROCEDURE — 85025 COMPLETE CBC W/AUTO DIFF WBC: CPT | Performed by: INTERNAL MEDICINE

## 2022-08-23 PROCEDURE — 93005 EKG 12-LEAD: ICD-10-PCS | Mod: S$GLB,,, | Performed by: INTERNAL MEDICINE

## 2022-08-23 PROCEDURE — 80053 COMPREHEN METABOLIC PANEL: CPT | Performed by: INTERNAL MEDICINE

## 2022-08-23 PROCEDURE — 1160F PR REVIEW ALL MEDS BY PRESCRIBER/CLIN PHARMACIST DOCUMENTED: ICD-10-PCS | Mod: CPTII,S$GLB,, | Performed by: INTERNAL MEDICINE

## 2022-08-23 PROCEDURE — 3052F PR MOST RECENT HEMOGLOBIN A1C LEVEL 8.0 - < 9.0%: ICD-10-PCS | Mod: CPTII,S$GLB,, | Performed by: INTERNAL MEDICINE

## 2022-08-23 PROCEDURE — 3079F DIAST BP 80-89 MM HG: CPT | Mod: CPTII,S$GLB,, | Performed by: INTERNAL MEDICINE

## 2022-08-23 PROCEDURE — 3008F PR BODY MASS INDEX (BMI) DOCUMENTED: ICD-10-PCS | Mod: CPTII,S$GLB,, | Performed by: INTERNAL MEDICINE

## 2022-08-23 PROCEDURE — 3077F SYST BP >= 140 MM HG: CPT | Mod: CPTII,S$GLB,, | Performed by: INTERNAL MEDICINE

## 2022-08-23 PROCEDURE — 99214 OFFICE O/P EST MOD 30 MIN: CPT | Mod: S$GLB,,, | Performed by: INTERNAL MEDICINE

## 2022-08-23 PROCEDURE — 99999 PR PBB SHADOW E&M-EST. PATIENT-LVL V: CPT | Mod: PBBFAC,,, | Performed by: INTERNAL MEDICINE

## 2022-08-23 PROCEDURE — 4010F PR ACE/ARB THEARPY RXD/TAKEN: ICD-10-PCS | Mod: CPTII,S$GLB,, | Performed by: INTERNAL MEDICINE

## 2022-08-23 PROCEDURE — 93005 ELECTROCARDIOGRAM TRACING: CPT | Mod: S$GLB,,, | Performed by: INTERNAL MEDICINE

## 2022-08-23 PROCEDURE — 93010 ELECTROCARDIOGRAM REPORT: CPT | Mod: S$GLB,,, | Performed by: INTERNAL MEDICINE

## 2022-08-23 PROCEDURE — 3072F LOW RISK FOR RETINOPATHY: CPT | Mod: CPTII,S$GLB,, | Performed by: INTERNAL MEDICINE

## 2022-08-23 PROCEDURE — 1159F MED LIST DOCD IN RCRD: CPT | Mod: CPTII,S$GLB,, | Performed by: INTERNAL MEDICINE

## 2022-08-23 PROCEDURE — 4010F ACE/ARB THERAPY RXD/TAKEN: CPT | Mod: CPTII,S$GLB,, | Performed by: INTERNAL MEDICINE

## 2022-08-23 PROCEDURE — 3079F PR MOST RECENT DIASTOLIC BLOOD PRESSURE 80-89 MM HG: ICD-10-PCS | Mod: CPTII,S$GLB,, | Performed by: INTERNAL MEDICINE

## 2022-08-23 PROCEDURE — 99999 PR PBB SHADOW E&M-EST. PATIENT-LVL V: ICD-10-PCS | Mod: PBBFAC,,, | Performed by: INTERNAL MEDICINE

## 2022-08-23 NOTE — PROGRESS NOTES
Subjective:     Mariann Huff is a 62 y.o. female who presents for   Chief Complaint   Patient presents with    Loss of Consciousness     2 episodes.  One several months ago.  One Friday 8/19.  Ambulance called.  All vitals and blood sugar were good.  No trip to ER.  Was standing up and just lost consciousness.  Was out for a few minutes.  No dizziness before or after.   She is accompanied by her .    Loss of Consciousness  This is a recurrent problem. The current episode started more than 1 month ago (2 episodes, recent one was last Friday and witnessed by ). The problem occurs intermittently. She lost consciousness for a period of 1 to 5 minutes. The symptoms are aggravated by standing. Pertinent negatives include no abdominal pain, bladder incontinence, bowel incontinence, chest pain, confusion, diaphoresis, dizziness, fever, focal sensory loss, headaches, light-headedness, malaise/fatigue, nausea, palpitations, slurred speech, vertigo or vomiting. Associated symptoms comments: Mild confusion right after episode. She has tried position change for the symptoms. The treatment provided moderate relief.     She refused ER after EMS wanted to take her to ER for further evaluation. She also did not seek evaluation after her first syncopal episode. The  reports that each time the patient fainted, she would recover and stand up and then fall due to her legs giving out. She denies loss of consciousness during the second fall.    Hypertension: The patient has been taking medications as instructed, no medication side effects noted, no chest pain on exertion, no dyspnea on exertion and no swelling of ankles. + syncopal episodes x2 and BP is not controlled today.    BP Readings from Last 3 Encounters:   08/23/22 (!) 159/81   08/10/22 127/67   07/29/22 120/60         Review of Systems   Constitutional: Negative for chills, diaphoresis, fatigue, fever and malaise/fatigue.   HENT: Negative for  congestion, ear discharge, ear pain, hearing loss and sinus pressure.    Eyes: Negative for photophobia and visual disturbance.   Respiratory: Negative for cough and shortness of breath.    Cardiovascular: Positive for syncope. Negative for chest pain, palpitations and leg swelling.   Gastrointestinal: Negative for abdominal pain, bowel incontinence, diarrhea, nausea and vomiting.   Genitourinary: Negative for bladder incontinence.   Musculoskeletal: Negative for arthralgias and myalgias.   Skin: Negative for rash and wound.   Neurological: Negative for dizziness, vertigo, tremors, light-headedness and headaches.   Psychiatric/Behavioral: Negative for confusion.          Objective:     Physical Exam  Vitals reviewed.   Constitutional:       General: She is awake. She is not in acute distress.     Appearance: Normal appearance. She is well-developed and well-groomed.   HENT:      Head: Normocephalic and atraumatic.      Right Ear: Hearing and external ear normal.      Left Ear: Hearing and external ear normal.      Nose: Nose normal. No congestion.      Mouth/Throat:      Mouth: Mucous membranes are moist.   Eyes:      General: Lids are normal. Vision grossly intact. Gaze aligned appropriately.   Cardiovascular:      Rate and Rhythm: Normal rate and regular rhythm.      Heart sounds: Normal heart sounds. No murmur heard.  Pulmonary:      Effort: Pulmonary effort is normal.      Breath sounds: Normal breath sounds. No decreased breath sounds or wheezing.   Abdominal:      General: Bowel sounds are normal. There is no distension.   Musculoskeletal:         General: Normal range of motion.      Cervical back: Normal range of motion. No rigidity. No pain with movement. Normal range of motion.      Right lower leg: No edema.      Left lower leg: No edema.   Skin:     General: Skin is warm and dry.      Findings: No lesion or rash.   Neurological:      Mental Status: She is alert and oriented to person, place, and time.    Psychiatric:         Attention and Perception: Attention normal.         Mood and Affect: Mood normal.         Behavior: Behavior is cooperative.            Assessment:      1. Syncope, unspecified syncope type    2. Hypertension associated with diabetes    3. Abnormal EKG    4. Chronic kidney disease, stage 3a           Plan:     1. Syncope, unspecified syncope type  - IN OFFICE EKG 12-LEAD (to [place])  - Ambulatory referral/consult to Neurology; Future  - negative orthostatics    2. Hypertension associated with diabetes  - Urinalysis; Future  - CBC Auto Differential; Future  - Comprehensive Metabolic Panel; Future    3. Abnormal EKG  - Stress Echo Which stress agent will be used? Treadmill Exercise; Color Flow Doppler? No; Future    4. Chronic kidney disease, stage 3a  - Comprehensive Metabolic Panel; Future      Call if there is no improvement in symptoms    __________________________    Saba Nash MD, PharmD  [facility]- Internal Medicine  American Board of Obesity Medicine diplomate  Office [phone]

## 2022-08-26 RX ORDER — ESZOPICLONE 2 MG/1
2 TABLET, FILM COATED ORAL NIGHTLY
Qty: 30 TABLET | Refills: 0 | Status: SHIPPED | OUTPATIENT
Start: 2022-08-26 | End: 2022-09-25 | Stop reason: SDUPTHER

## 2022-08-30 ENCOUNTER — OFFICE VISIT (OUTPATIENT)
Dept: NEUROLOGY | Facility: CLINIC | Age: 62
End: 2022-08-30
Payer: COMMERCIAL

## 2022-08-30 VITALS
HEIGHT: 64 IN | DIASTOLIC BLOOD PRESSURE: 77 MMHG | HEART RATE: 76 BPM | SYSTOLIC BLOOD PRESSURE: 134 MMHG | BODY MASS INDEX: 27.01 KG/M2 | WEIGHT: 158.19 LBS

## 2022-08-30 DIAGNOSIS — R55 SYNCOPE AND COLLAPSE: Primary | ICD-10-CM

## 2022-08-30 DIAGNOSIS — R55 SYNCOPE, UNSPECIFIED SYNCOPE TYPE: ICD-10-CM

## 2022-08-30 PROCEDURE — 3072F LOW RISK FOR RETINOPATHY: CPT | Mod: CPTII,S$GLB,, | Performed by: PSYCHIATRY & NEUROLOGY

## 2022-08-30 PROCEDURE — 3008F BODY MASS INDEX DOCD: CPT | Mod: CPTII,S$GLB,, | Performed by: PSYCHIATRY & NEUROLOGY

## 2022-08-30 PROCEDURE — 3075F SYST BP GE 130 - 139MM HG: CPT | Mod: CPTII,S$GLB,, | Performed by: PSYCHIATRY & NEUROLOGY

## 2022-08-30 PROCEDURE — 3078F DIAST BP <80 MM HG: CPT | Mod: CPTII,S$GLB,, | Performed by: PSYCHIATRY & NEUROLOGY

## 2022-08-30 PROCEDURE — 3072F PR LOW RISK FOR RETINOPATHY: ICD-10-PCS | Mod: CPTII,S$GLB,, | Performed by: PSYCHIATRY & NEUROLOGY

## 2022-08-30 PROCEDURE — 3078F PR MOST RECENT DIASTOLIC BLOOD PRESSURE < 80 MM HG: ICD-10-PCS | Mod: CPTII,S$GLB,, | Performed by: PSYCHIATRY & NEUROLOGY

## 2022-08-30 PROCEDURE — 4010F PR ACE/ARB THEARPY RXD/TAKEN: ICD-10-PCS | Mod: CPTII,S$GLB,, | Performed by: PSYCHIATRY & NEUROLOGY

## 2022-08-30 PROCEDURE — 99204 PR OFFICE/OUTPT VISIT, NEW, LEVL IV, 45-59 MIN: ICD-10-PCS | Mod: S$GLB,,, | Performed by: PSYCHIATRY & NEUROLOGY

## 2022-08-30 PROCEDURE — 99204 OFFICE O/P NEW MOD 45 MIN: CPT | Mod: S$GLB,,, | Performed by: PSYCHIATRY & NEUROLOGY

## 2022-08-30 PROCEDURE — 99999 PR PBB SHADOW E&M-EST. PATIENT-LVL V: CPT | Mod: PBBFAC,,, | Performed by: PSYCHIATRY & NEUROLOGY

## 2022-08-30 PROCEDURE — 4010F ACE/ARB THERAPY RXD/TAKEN: CPT | Mod: CPTII,S$GLB,, | Performed by: PSYCHIATRY & NEUROLOGY

## 2022-08-30 PROCEDURE — 3075F PR MOST RECENT SYSTOLIC BLOOD PRESS GE 130-139MM HG: ICD-10-PCS | Mod: CPTII,S$GLB,, | Performed by: PSYCHIATRY & NEUROLOGY

## 2022-08-30 PROCEDURE — 3052F HG A1C>EQUAL 8.0%<EQUAL 9.0%: CPT | Mod: CPTII,S$GLB,, | Performed by: PSYCHIATRY & NEUROLOGY

## 2022-08-30 PROCEDURE — 3008F PR BODY MASS INDEX (BMI) DOCUMENTED: ICD-10-PCS | Mod: CPTII,S$GLB,, | Performed by: PSYCHIATRY & NEUROLOGY

## 2022-08-30 PROCEDURE — 1159F MED LIST DOCD IN RCRD: CPT | Mod: CPTII,S$GLB,, | Performed by: PSYCHIATRY & NEUROLOGY

## 2022-08-30 PROCEDURE — 99999 PR PBB SHADOW E&M-EST. PATIENT-LVL V: ICD-10-PCS | Mod: PBBFAC,,, | Performed by: PSYCHIATRY & NEUROLOGY

## 2022-08-30 PROCEDURE — 3052F PR MOST RECENT HEMOGLOBIN A1C LEVEL 8.0 - < 9.0%: ICD-10-PCS | Mod: CPTII,S$GLB,, | Performed by: PSYCHIATRY & NEUROLOGY

## 2022-08-30 PROCEDURE — 1159F PR MEDICATION LIST DOCUMENTED IN MEDICAL RECORD: ICD-10-PCS | Mod: CPTII,S$GLB,, | Performed by: PSYCHIATRY & NEUROLOGY

## 2022-08-30 NOTE — PROGRESS NOTES
"  Mariann Huff is a 62 y.o. year old female that presents for evaluation of recurrent episodes of transient loss of consciousness.      HPI:  Mrs Huff has HTN, HLD, DM, chronic combined CHF, CKD-2, CAD s/p stenting, NSTEMI, MURTAZA, obesity, and recent episodes of witnessed transient loss of consciousness.  Stated that she had had two recent episodes in which " I was standing up and all of the sudden, without any warning, I lost consciousness and went down". She stressed that both episodes were very brief, up to one minute, no associated with abnormal movements, bladder incontinence, tongue biting, or confusion after the event. Nonetheless, she told me that she has no full recollection of these episodes. Did not seek immediate medical attention at that time.  Said that overall she hasn't noticed any new symptoms since these episodes. No new medications or interval medical illness.  Today, she denies HA, vertigo, double vision, imbalance, difficulty swallowing, focal weakness or numbness, slurred speech, tremors, language or visual disturbances.    Past Medical History:   Diagnosis Date    Anticoagulant long-term use     Asthma     Back pain     Bradycardia, unspecified 5/8/2019    The etiology of the bradycardic episode is unclear.  The have appear to be respiratory in origin (at least the 1st episode).  We will continue to monitor carefully.  We are awaiting evaluation by Cardiology.      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, with long-term current use of insulin 1/9/2020     Social History     Socioeconomic History    Marital status:      Spouse name: Shamir    Number of children: 2   Occupational History    Occupation: [employer]     Employer: [employer]     Employer: [employer]     Employer: [employer]   Tobacco Use    Smoking status: Never    Smokeless tobacco: Never   Substance and Sexual Activity    Alcohol use: No     Alcohol/week: 0.0 standard drinks    Drug use: No    Sexual activity: Yes     Partners: Male     Birth control/protection: Post-menopausal     Comment:    Other Topics Concern    Are you pregnant or think you may be? No    Breast-feeding No   Social History Narrative    . 2 children.      Social Determinants of Health     Physical Activity: Unknown    Days of Exercise per Week: 1 day   Stress: Stress Concern Present    Feeling of Stress : Rather much   Social Connections: Unknown    Frequency of Communication with Friends and Family: Three times a week    Frequency of Social Gatherings with Friends and Family: Once a week    Active Member of Clubs or Organizations: No    Attends Club or Organization Meetings: Patient refused    Marital Status:      Past Surgical History:   Procedure Laterality Date    ANTEGRADE NEPHROSTOGRAPHY Left 12/11/2019    Procedure: Nephrostogram - antegrade;  Surgeon: Robin Boyd MD;  Location: [address];  Service: Urology;  Laterality: Left;    BRONCHOSCOPY N/A 5/2/2019    Procedure: BRONCHOSCOPY;  Surgeon: Sean Ruano MD;  Location: [address];  Service: General;  Laterality: N/A;    CARDIAC CATHETERIZATION      CATARACT EXTRACTION      cataract extraction left eye      cataracts      CAUDAL EPIDURAL STEROID INJECTION N/A 2/7/2019    Procedure: Injection-steroid-epidural-caudal;  Surgeon: Dave Bentley Jr., MD;  Location:  [facility] PAIN MGT;  Service: Pain Management;  Laterality: N/A;     SECTION, LOW TRANSVERSE      COLONOSCOPY N/A 2019    Procedure: COLONOSCOPY;  Surgeon: Al Alaniz MD;  Location: [facility] ENDO (2ND FLR);  Service: Endoscopy;  Laterality: N/A;    CORONARY ANGIOPLASTY      CYSTOGRAM N/A 2019    Procedure: CYSTOGRAM INTRAOP;  Surgeon: Robin Boyd MD;  [facility];  Service: Urology;  Laterality: N/A;  1 HOUR    CYSTOSCOPY W/ RETROGRADES Left 2019    Procedure: CYSTOSCOPY, WITH RETROGRADE PYELOGRAM;  Surgeon: Robin Boyd MD;  [facility];  Service: Urology;  Laterality: Left;  fluro    ESOPHAGOGASTRODUODENOSCOPY W/ PEG  2019    Procedure: EGD, WITH PEG TUBE INSERTION;  Surgeon: Sean Ruano MD;  [facility];  Service: General;;    EXCISION TURBINATE, SUBMUCOUS      FLEXIBLE SIGMOIDOSCOPY N/A 2019    Procedure: SIGMOIDOSCOPY, FLEXIBLE;  Surgeon: ALBERTO Amin MD;  Location: [facility] ENDO (2ND FLR);  Service: Endoscopy;  Laterality: N/A;    FLEXIBLE SIGMOIDOSCOPY N/A 2019    Procedure: SIGMOIDOSCOPY, FLEXIBLE;  Surgeon: ALBERTO Amin MD;  [facility]  Service: Endoscopy;  Laterality: N/A;    FUSION OF LUMBAR SPINE BY ANTERIOR APPROACH Left 2019    Procedure: FUSION, SPINE, LUMBAR, ANTERIOR APPROACH Left L5-S1 Anterior to Psoa Interbody Fusion, L5-S1 Posterior Instrumentation;  Surgeon: Mk George MD;  [facility];  Service: Neurosurgery;  Laterality: Left;  Porcedure:  Left L5-S1 Anterior to Psoa Interbody Fusion, L5-S1 Posterior Instrumentation  Surgery Time: 4 Hrs  LOS: 2-3  Anesthesia: General   Blood: Type & Screen  R    HAND SURGERY Left     HAND SURGERY Right     torn ligament repair/ Dr. Yeboah    HYSTERECTOMY      INJECTION OF STEROID Right 12/10/2020    Procedure: INJECTION, STEROID Right SI Joint Block and Steroid Injection;  Surgeon: Mk George MD;  Location: [facility]  OR;  Service: Neurosurgery;  Laterality: Right;  Procedure: Right SI Joint Block and Steroid Injection   SUrgery Time: 30 Min  LOS: 0  Anesthesia: MAC  Radiology: C-arm  Bed: Tomer 4 Poster  Position: Prone    INJECTION OF STEROID Right 9/28/2021    Procedure: INJECTION, STEROID Right SI joint block & steroid injection;  Surgeon: Mk George MD;  Location: [facility] OR;  Service: Neurosurgery;  Laterality: Right;  Procedure: Right SI joint block & steroid injection  Surgery Time: 30m  Anesthesia: Local MAC  Radiology: C-arm  Bed: Regular  Position: Prone    left foot surgery      left wrist surgery      LYSIS OF ADHESIONS N/A 2/19/2020    Procedure: LYSIS, ADHESIONS;  Surgeon: Robin Boyd MD;  Location: [address];  Service: Urology;  Laterality: N/A;    NASAL SEPTUM SURGERY  5/7/15    PERCUTANEOUS NEPHROSTOMY Left 4/21/2019    Procedure: Creation, Nephrostomy, Percutaneous;  Surgeon: Karina Surgeon;  Location: [facility];  Service: Anesthesiology;  Laterality: Left;    REPAIR OF URETER  4/12/2019    Procedure: REPAIR, URETER;  Surgeon: Mk George MD;  Location: [address];  Service: Neurosurgery;;    REPLACEMENT OF NEPHROSTOMY TUBE N/A 7/18/2019    Procedure: REPLACEMENT, NEPHROSTOMY TUBE;  Surgeon: [facility] Diagnostic Provider;  [address];  Service: Anesthesiology;  Laterality: N/A;  188    REPLACEMENT OF NEPHROSTOMY TUBE N/A 7/24/2019    Procedure: REPLACEMENT, NEPHROSTOMY TUBE;  Surgeon: [facility] Diagnostic Provider;  [address];  Service: Anesthesiology;  Laterality: N/A;  188    REPLACEMENT OF NEPHROSTOMY TUBE N/A 10/7/2019    Procedure: REPLACEMENT, NEPHROSTOMY TUBE;  Surgeon: [facility] Diagnostic Provider;  [address];  Service: Anesthesiology;  Laterality: N/A;  189    REPLACEMENT OF NEPHROSTOMY TUBE N/A 11/25/2019    Procedure: REPLACEMENT, NEPHROSTOMY TUBE;  Surgeon: [facility] Diagnostic Provider;  [address];  Service: Anesthesiology;   Laterality: N/A;  Room 188/Bessy    REPLACEMENT OF NEPHROSTOMY TUBE Right 2/19/2020    Procedure: REPLACEMENT, NEPHROSTOMY TUBE removal removal;  Surgeon: Robin Boyd MD;  Location: [facility] OR [address] [place] [address];  Service: Urology;  Laterality: Right;    rt elbow surgery      S/P LAD COATED STENT  05/14/2010    6 total     S/P OM1 STENT  08/2003    SINUS SURGERY      F.E.S.S.    TRACHEOSTOMY N/A 5/2/2019    Procedure: CREATION, TRACHEOSTOMY;  Surgeon: Sean Ruano MD;  Location: [facility] OR [address] [place] [address];  Service: General;  Laterality: N/A;    TUBAL LIGATION      URETERAL REIMPLANTION Left 2/19/2020    Procedure: REIMPLANTATION, URETER WITH PSOAS HITCH;  Surgeon: Robin Boyd MD;  Location: [address];  Service: Urology;  Laterality: Left;     Family History   Problem Relation Age of Onset    Diabetes Mother     Heart disease Mother     Diabetes Father     Leukemia Father         leukemia    Heart attack Father     Diabetes Sister     Diabetes Brother     Diabetes Sister     No Known Problems Sister     No Known Problems Brother     No Known Problems Brother     No Known Problems Maternal Grandmother     No Known Problems Maternal Grandfather     No Known Problems Paternal Grandmother     No Known Problems Paternal Grandfather     No Known Problems Son     No Known Problems Son     No Known Problems Maternal Aunt     No Known Problems Maternal Uncle     No Known Problems Paternal Aunt     No Known Problems Paternal Uncle     Colon cancer Neg Hx     Inflammatory bowel disease Neg Hx     Melanoma Neg Hx     Psoriasis Neg Hx     Lupus Neg Hx     Eczema Neg Hx     Acne Neg Hx     Amblyopia Neg Hx     Blindness Neg Hx     Cancer Neg Hx     Cataracts Neg Hx     Glaucoma Neg Hx     Hypertension Neg Hx     Macular degeneration Neg Hx     Retinal detachment Neg Hx     Strabismus Neg Hx     Stroke Neg Hx     Thyroid disease Neg Hx     Heart failure Neg Hx     Hyperlipidemia Neg Hx            Review of Systems  General ROS: negative  for chills, fever or weight loss  Psychological ROS: negative for hallucination, depression or suicidal ideation  Ophthalmic ROS: negative for blurry vision, photophobia or eye pain  ENT ROS: negative for epistaxis, sore throat or rhinorrhea  Respiratory ROS: no cough, shortness of breath, or wheezing  Cardiovascular ROS: no chest pain or dyspnea on exertion  Gastrointestinal ROS: no abdominal pain, change in bowel habits, or black/ bloody stools  Genito-Urinary ROS: no dysuria, trouble voiding, or hematuria  Musculoskeletal ROS: negative for gait disturbance or muscular weakness  Neurological ROS: no syncope or seizures; no ataxia  Dermatological ROS: negative for pruritis, rash and jaundice      Physical Exam:  LMP  (LMP Unknown)   General appearance: alert, cooperative, no distress  Constitutional:Oriented to person, place, and time.appears well-developed and well-nourished.   HEENT: Normocephalic, atraumatic, neck symmetrical, no nasal discharge   Eyes: conjunctivae/corneas clear, PERRL, EOM's intact  Lungs: clear to auscultation bilaterally, no dullness to percussion bilaterally  Heart: regular rate and rhythm without rub; no displacement of the PMI   Abdomen: soft, non-tender; bowel sounds normoactive; no organomegaly  Extremities: extremities symmetric; no clubbing, cyanosis, or edema  Integument: Skin color, texture, turgor normal; no rashes; hair distrubution normal  Neurologic:   Mental status: alert and awake, oriented x 4, comprehension, naming, and repetition intact. No right to left confusion. Performs serial 7's without difficulty .No dysarthria.  CN 2-12: pupils 4 mm bilaterally, reactive to light. Fundi without papilledema. Visual fields full to confrontation. EOM full without nystagmus. Face sensation normal in all distributions. Face symmetric. Hearing grossly intact. Palate elevates well. Tongue midline without atrophy or fasciculations.  Motor: 5/5 all over  Sensory: intact in all  modalities.  DTR's: 2+ all over.  Plantars: no tested.  Coordination: finger to nose and heel-knee-shin intact.  Gait: no ataxia or bradykinesia       LABS:    Complete Blood Count  Lab Results   Component Value Date    RBC 4.63 08/23/2022    HGB 12.6 08/23/2022    HCT 41.2 08/23/2022    MCV 89 08/23/2022    MCH 27.2 08/23/2022    MCHC 30.6 (L) 08/23/2022    RDW 13.7 08/23/2022     08/23/2022    MPV 11.2 08/23/2022    GRAN 4.8 08/23/2022    GRAN 55.7 08/23/2022    LYMPH 2.8 08/23/2022    LYMPH 32.8 08/23/2022    MONO 0.7 08/23/2022    MONO 7.6 08/23/2022    EOS 0.2 08/23/2022    BASO 0.05 08/23/2022    EOSINOPHIL 2.8 08/23/2022    BASOPHIL 0.6 08/23/2022    DIFFMETHOD Automated 08/23/2022       Comprehensive Metabolic Panel  Lab Results   Component Value Date     (H) 08/23/2022    BUN 23 08/23/2022    CREATININE 1.2 08/23/2022     08/23/2022    K 4.6 08/23/2022     08/23/2022    PROT 7.9 08/23/2022    ALBUMIN 4.4 08/23/2022    BILITOT 1.3 (H) 08/23/2022    AST 16 08/23/2022    ALKPHOS 127 08/23/2022    CO2 28 08/23/2022    ALT 17 08/23/2022    ANIONGAP 9 08/23/2022    EGFRNONAA 54.3 (A) 06/14/2022    ESTGFRAFRICA >60.0 06/14/2022       TSH  Lab Results   Component Value Date    TSH 1.000 03/31/2022         Assessment: 63 y/o with HTN, HLD, DM, chronic combined CHF, CKD-2, CAD s/p stenting, NSTEMI, MURTAZA, and recent episodes x 2 of transient loss of consciousness with the pattern described above.  Neuro exam is normal.  No neuroimaging studies to review today.  Broad differential including drop attacks, VB insufficiency, less likely seizures. Patient has a strong cardiac history, thus a cardiac etiology of syncope should also be in the differential.  Will obtain MRI/MRA brain and EEG to better elucidate her symptoms.            ICD-10-CM ICD-9-CM    1. Syncope, unspecified syncope type  R55 780.2 Ambulatory referral/consult to Neurology        The encounter diagnosis was Syncope, unspecified  syncope type.      Plan:  1) Recurrent syncope: as above  2) HTN  3) HLD  4) DM  5) CAD  6)NSTEMI  7) CKD-2  8) MURTAZA  9) CHF  No orders of the defined types were placed in this encounter.          Med Bryan MD

## 2022-08-31 ENCOUNTER — SPECIALTY PHARMACY (OUTPATIENT)
Dept: PHARMACY | Facility: CLINIC | Age: 62
End: 2022-08-31
Payer: COMMERCIAL

## 2022-08-31 NOTE — TELEPHONE ENCOUNTER
Per pt, has 1 Repatha pen left which she will inject for 9/5/22. Her next dose will then not be due until 9/19/22 as she injects q2 weeks. Will pend call closer to actual injection date.

## 2022-09-02 ENCOUNTER — TELEPHONE (OUTPATIENT)
Dept: INTERNAL MEDICINE | Facility: CLINIC | Age: 62
End: 2022-09-02

## 2022-09-02 ENCOUNTER — TELEPHONE (OUTPATIENT)
Dept: NEUROLOGY | Facility: CLINIC | Age: 62
End: 2022-09-02
Payer: COMMERCIAL

## 2022-09-02 NOTE — TELEPHONE ENCOUNTER
----- Message from Ayesha Muniz RN sent at 9/2/2022 10:47 AM CDT -----  Regarding: treadmill exercise stress echo  Good morning,          The above pt came in for a stress test today, but she stated she is not able to walk on the treadmill. I see she has had a dobutamine stress echo in the past, but dobutamine is on back order. We would recommend a nuclear stress test at [facility]. The patient would like to be called today to schedule a different test.         Ayesha Muniz RN  [id number]

## 2022-09-06 NOTE — TELEPHONE ENCOUNTER
Specialty Pharmacy - Refill Coordination    Specialty Medication Orders Linked to Encounter      Flowsheet Row Most Recent Value   Medication #1 evolocumab (REPATHA SURECLICK) 140 mg/mL PnIj (Order#[id number], Rx#[id number])            Refill Questions - Documented Responses      Flowsheet Row Most Recent Value   Patient Availability and HIPAA Verification    Does patient want to proceed with activity? Yes   HIPAA/medical authority confirmed? Yes   Relationship to patient of person spoken to? Self   Refill Screening Questions    Would patient like to speak to a pharmacist? No   When does the patient need to receive the medication? 09/19/22   Refill Delivery Questions    How will the patient receive the medication? Delivery Toshia   When does the patient need to receive the medication? 09/19/22   Shipping Address Prescription   Address in [facility] confirmed and updated if neccessary? Yes   Expected Copay ($) 0   Is the patient able to afford the medication copay? Yes   Payment Method zero copay   Days supply of Refill 28   Supplies needed? No supplies needed   Refill activity completed? Yes   Refill activity plan Refill scheduled   Shipment/Pickup Date: 09/12/22            Current Outpatient Medications   Medication Sig    ACCU-CHEK EDIN PLUS METER Misc     albuterol (PROVENTIL/VENTOLIN HFA) 90 mcg/actuation inhaler Inhale 2 puffs into the lungs every 6 (six) hours as needed for Wheezing.    albuterol-ipratropium (DUO-NEB) 2.5 mg-0.5 mg/3 mL nebulizer solution Inhale 3 mLs into the lungs.    amLODIPine (NORVASC) 10 MG tablet Take 1 tablet (10 mg total) by mouth once daily.    aspirin 81 mg Tab Take 81 mg by mouth. 1 Tablet Oral Every day    atorvastatin (LIPITOR) 40 MG tablet Take 1 tablet (40 mg total) by mouth every evening.    blood sugar diagnostic (ACCU-CHEK EDIN PLUS TEST STRP) Strp TEST BLOOD SUGARS 4 TIMES DAILY    cilostazoL (PLETAL) 100 MG Tab Take 1 tablet (100 mg total) by mouth 2 (two) times  "daily.    diclofenac sodium (VOLTAREN) 1 % Gel Apply 2 g topically 3 (three) times daily. Apply to the area of pain 2-3x per day or night as needed    EScitalopram oxalate (LEXAPRO) 10 MG tablet Take 1 tablet (10 mg total) by mouth once daily.    eszopiclone (LUNESTA) 2 MG Tab Take 1 tablet (2 mg total) by mouth every evening.    evolocumab (REPATHA SURECLICK) 140 mg/mL PnIj Inject 1 mL (140 mg total) into the skin every 14 (fourteen) days.    hydroCHLOROthiazide (HYDRODIURIL) 12.5 MG Tab Take 1 tablet (12.5 mg total) by mouth once daily.    isosorbide mononitrate (ISMO,MONOKET) 10 mg tablet Take 1 tablet (10 mg total) by mouth once daily.    metoprolol tartrate (LOPRESSOR) 50 MG tablet TAKE 1 TABLET BY MOUTH TWICE A DAY    multivitamin (THERAGRAN) per tablet Take 1 tablet by mouth once daily.    pen needle, diabetic (NOVOTWIST) 32 gauge x 1/5" Ndle Use 1 needle to inject insulin four times daily    potassium chloride SA (K-DUR,KLOR-CON) 20 MEQ tablet Take 1 tablet (20 mEq total) by mouth 2 (two) times daily.    promethazine (PHENERGAN) 12.5 MG Tab Take 1 tablet (12.5 mg total) by mouth every 8 (eight) hours as needed (nausea).    telmisartan (MICARDIS) 80 MG Tab Take 1 tablet (80 mg total) by mouth once daily. Stop losartan   Last reviewed on 8/30/2022  8:06 AM by Christian Albrecht MA    Review of patient's allergies indicates:   Allergen Reactions    Milk containing products      Causes GI distress    Last reviewed on  8/30/2022 8:05 AM by Christian Albrecht      Tasks added this encounter   No tasks added.   Tasks due within next 3 months   8/31/2022 - Refill Call (Auto Added)     Monique Myers, PrernaD  [facility]  [address] [place] [id number]  Phone: [phone]  Fax: [phone]        "

## 2022-09-14 ENCOUNTER — HOSPITAL ENCOUNTER (OUTPATIENT)
Dept: NEUROLOGY | Facility: CLINIC | Age: 62
Discharge: HOME | End: 2022-09-14
Payer: COMMERCIAL

## 2022-09-14 DIAGNOSIS — R55 SYNCOPE, UNSPECIFIED SYNCOPE TYPE: ICD-10-CM

## 2022-09-14 PROCEDURE — 95816 EEG AWAKE AND DROWSY: CPT | Mod: S$GLB,,, | Performed by: PSYCHIATRY & NEUROLOGY

## 2022-09-14 PROCEDURE — 95816 PR EEG,W/AWAKE & DROWSY RECORD: ICD-10-PCS | Mod: S$GLB,,, | Performed by: PSYCHIATRY & NEUROLOGY

## 2022-09-14 NOTE — PROCEDURES
Procedures  EEG REPORT      Mariann Huff  [id number]  1960    DATE OF SERVICE: 9/14/2022         METHODOLOGY      Extended electroencephalographic recording is made while the patient is ambulatory and continuing normal daily activities.  Electrodes are placed according to the International 10-20 placement system and included T1 and T2 electrode placement.  Twenty four (24) channels of digital signal (sampling rate of 512/sec) was simultaneously recorded from the scalp including EKG and eye monitors.  Recording band pass was 0.1 to 100 hz and all data was stored digitally on the recorder.  The patient is instructed to press an event button when clinical symptoms occur and write the symptoms into a diary. Activation procedures which include photic stimulation, hyperventilation and instructing patients to perform simple task are done in selected patients.        The EEG is displayed on a monitor screen and can be reformatted into different montages for evaluation.  The entire recoding is submitted for computer assisted analysis to detect spike and electrographic seizure activity.  The entire recording is visually reviewed and the times identified by computer analysis as being spikes or seizures are reviewed again.  Compresses spectral analysis (CSA) is also performed on the activity recorded from each individual channel.  This is displayed as a power display of frequencies from 0 to 30 Hz over time.   The CSA analysis is done and displayed continuously.  This is reviewed for asymmetries in power between homologous areas of the scalp and for presence of changes in power which canbe seen when seizures occur.  Sections of suspected abnormalities on the CSA is then compared with the original EEG recording.  .     [employer] software was also utilized in the review of this study.  This software suite analyzes the EEG recording in multiple domains.  Coherence and rhythmicity is computed to identify EEG sections  which may contain organized seizures.  Each channel undergoes analysis to detect presence of spike and sharp waves which have special and morphological characteristic of epileptic activity.  The routine EEG recording is converted from spacial into frequency domain.  This is then displayed comparing homologous areas to identify areas of significant asymmetry.  Algorithm to identify non-cortically generated artifact is used to separate eye movement, EMG and other artifact from the EEG     Recording Times    A total of 00:30:09 hours of EEG was recorded.      EEG FINDINGS:  Background activity:   The background rhythm was characterized by alpha and anterior dominant beta activity with a 10 Hz posterior dominant alpha rhythm at 30-70 microvolts.   Symmetry and continuity: the background was continuous and symmetric     Sleep:   No sleep transients were seen.    Activation procedures:   Photic stimulation was performed with no abnormalities seen    Abnormal activity:   No epileptiform discharges, periodic discharges, lateralized rhythmic delta activity or electrographic seizures were seen.    IMPRESSION:   Normal EEG of the waking state.      Basilio Kim MD  Neurology-Epilepsy.  [facility].

## 2022-09-20 ENCOUNTER — DOCUMENTATION ONLY (OUTPATIENT)
Dept: CARDIOLOGY | Facility: CLINIC | Age: 62
End: 2022-09-20

## 2022-09-20 ENCOUNTER — OFFICE VISIT (OUTPATIENT)
Dept: CARDIOLOGY | Facility: CLINIC | Age: 62
End: 2022-09-20
Payer: COMMERCIAL

## 2022-09-20 VITALS
BODY MASS INDEX: 26.49 KG/M2 | HEIGHT: 64 IN | OXYGEN SATURATION: 100 % | DIASTOLIC BLOOD PRESSURE: 81 MMHG | WEIGHT: 155.19 LBS | SYSTOLIC BLOOD PRESSURE: 163 MMHG | HEART RATE: 68 BPM

## 2022-09-20 DIAGNOSIS — I73.9 PAD (PERIPHERAL ARTERY DISEASE): ICD-10-CM

## 2022-09-20 DIAGNOSIS — I73.9 CLAUDICATION OF BOTH LOWER EXTREMITIES: Primary | ICD-10-CM

## 2022-09-20 DIAGNOSIS — I73.9 CLAUDICATION OF BOTH LOWER EXTREMITIES: ICD-10-CM

## 2022-09-20 PROCEDURE — 1160F RVW MEDS BY RX/DR IN RCRD: CPT | Mod: CPTII,S$GLB,, | Performed by: INTERNAL MEDICINE

## 2022-09-20 PROCEDURE — 99215 OFFICE O/P EST HI 40 MIN: CPT | Mod: S$GLB,,, | Performed by: INTERNAL MEDICINE

## 2022-09-20 PROCEDURE — 3077F PR MOST RECENT SYSTOLIC BLOOD PRESSURE >= 140 MM HG: ICD-10-PCS | Mod: CPTII,S$GLB,, | Performed by: INTERNAL MEDICINE

## 2022-09-20 PROCEDURE — 99999 PR PBB SHADOW E&M-EST. PATIENT-LVL V: CPT | Mod: PBBFAC,,, | Performed by: INTERNAL MEDICINE

## 2022-09-20 PROCEDURE — 3008F BODY MASS INDEX DOCD: CPT | Mod: CPTII,S$GLB,, | Performed by: INTERNAL MEDICINE

## 2022-09-20 PROCEDURE — 3052F HG A1C>EQUAL 8.0%<EQUAL 9.0%: CPT | Mod: CPTII,S$GLB,, | Performed by: INTERNAL MEDICINE

## 2022-09-20 PROCEDURE — 4010F ACE/ARB THERAPY RXD/TAKEN: CPT | Mod: CPTII,S$GLB,, | Performed by: INTERNAL MEDICINE

## 2022-09-20 PROCEDURE — 1159F MED LIST DOCD IN RCRD: CPT | Mod: CPTII,S$GLB,, | Performed by: INTERNAL MEDICINE

## 2022-09-20 PROCEDURE — 3052F PR MOST RECENT HEMOGLOBIN A1C LEVEL 8.0 - < 9.0%: ICD-10-PCS | Mod: CPTII,S$GLB,, | Performed by: INTERNAL MEDICINE

## 2022-09-20 PROCEDURE — 1160F PR REVIEW ALL MEDS BY PRESCRIBER/CLIN PHARMACIST DOCUMENTED: ICD-10-PCS | Mod: CPTII,S$GLB,, | Performed by: INTERNAL MEDICINE

## 2022-09-20 PROCEDURE — 1159F PR MEDICATION LIST DOCUMENTED IN MEDICAL RECORD: ICD-10-PCS | Mod: CPTII,S$GLB,, | Performed by: INTERNAL MEDICINE

## 2022-09-20 PROCEDURE — 3072F PR LOW RISK FOR RETINOPATHY: ICD-10-PCS | Mod: CPTII,S$GLB,, | Performed by: INTERNAL MEDICINE

## 2022-09-20 PROCEDURE — 99999 PR PBB SHADOW E&M-EST. PATIENT-LVL V: ICD-10-PCS | Mod: PBBFAC,,, | Performed by: INTERNAL MEDICINE

## 2022-09-20 PROCEDURE — 3008F PR BODY MASS INDEX (BMI) DOCUMENTED: ICD-10-PCS | Mod: CPTII,S$GLB,, | Performed by: INTERNAL MEDICINE

## 2022-09-20 PROCEDURE — 3072F LOW RISK FOR RETINOPATHY: CPT | Mod: CPTII,S$GLB,, | Performed by: INTERNAL MEDICINE

## 2022-09-20 PROCEDURE — 3079F DIAST BP 80-89 MM HG: CPT | Mod: CPTII,S$GLB,, | Performed by: INTERNAL MEDICINE

## 2022-09-20 PROCEDURE — 3079F PR MOST RECENT DIASTOLIC BLOOD PRESSURE 80-89 MM HG: ICD-10-PCS | Mod: CPTII,S$GLB,, | Performed by: INTERNAL MEDICINE

## 2022-09-20 PROCEDURE — 99215 PR OFFICE/OUTPT VISIT, EST, LEVL V, 40-54 MIN: ICD-10-PCS | Mod: S$GLB,,, | Performed by: INTERNAL MEDICINE

## 2022-09-20 PROCEDURE — 4010F PR ACE/ARB THEARPY RXD/TAKEN: ICD-10-PCS | Mod: CPTII,S$GLB,, | Performed by: INTERNAL MEDICINE

## 2022-09-20 PROCEDURE — 3077F SYST BP >= 140 MM HG: CPT | Mod: CPTII,S$GLB,, | Performed by: INTERNAL MEDICINE

## 2022-09-20 NOTE — PROGRESS NOTES
OUTPATIENT CATHETERIZATION INSTRUCTIONS    You have been scheduled for a procedure in the catheterization lab on Thursday, October 27, 2022.     Please report to the Cardiology Waiting Area on the Third floor of the hospital and check in at 7:30 AM.   You will then be taken to the SSCU (Short Stay Cardiac Unit) and prepared for your procedure. Please be aware that this is not the time of your procedure but the time you are to arrive. The procedures are scheduled on an hourly basis; however, emergency cases take precedence over all other cases.       No solid foods 8 hours prior to your procedure.  You may have clear liquids until the time of your admission which should be 2 hours prior to your procedure.  You are encouraged to drink at least 8 ounces of clear liquids prior to your admission to SSCU.  Patients with gastric emptying issues should be fasting for 6- 8 hours prior to the procedure.  Clear liquids include water, black coffee, clear juices, and performance drinks - no pulp or milk.    Heart failure or dialysis patients will be limited to 8 ounces (1 cup) of clear liquids up until 2 hours of the procedure.    3.   You may take your regular morning medications with water. If there are any medications that you should not take you will be instructed to hold them that morning. If you         are diabetic and on Metformin (Glucophage) do not take it the day before, the day of, and for 2 days after your procedure.  4.   If you are on Pradaxa, Eliquis or Coumadin , you can hold them 3 days prior to your procedure.      The procedure will take 1-2 hours to perform. After the procedure, you will return to SSCU on the third floor of the hospital. You will need to lie still (or keep your arm still) for the next 2 to 4 hours to minimize bleeding from the puncture site.  This time is determined by your physician.  Your family may remain in the room with you during this time.       You may be able to be discharged home  "that same afternoon if there is someone to drive you home and there are no complications.  Your doctor will determine, based on your progress, the date and time of your discharge. The results of your procedure will be discussed with you before you are discharged. Any further testing or procedures will be scheduled for you either before you leave or after your discharge..       If you should have any questions, concerns, or need to change the date of your procedure, please call "GOPAL Huang @ [phone]            Special Instructions:  Continue taking your current prescribed medications.    On the morning of the procedure, DO NOT take any medications.          THE ABOVE INSTRUCTIONS WERE GIVEN TO THE PATIENT VERBALLY AND THEY VERBALIZED UNDERSTANDING.  THEY DO NOT REQUIRE ANY SPECIAL NEEDS AND DO NOT HAVE ANY LEARNING BARRIERS.          Directions for Reporting to Cardiology Waiting Area in the Hospital  If you park in the Parking Garage:  Take elevators to the1st floor of the parking garage.  Continue past the gift shop, coffee shop, and piano.  Take a right and go to the gold elevators. (Elevator B)  Take the elevator to the 3rd floor.  Follow the arrow on the sign on the wall that says Cath Lab Registration/EP Lab Registration.  Follow the long hallway all the way around until you come to a big open area.  This is the registration area.  Check in at Reception Desk.    OR    If family is dropping you off:  Have them drop you off at the front of the Hospital under the green overhang.  Enter through the doors and take a right.  Take the E elevators to the 3rd floor Cardiology Waiting Area.  Check in at the Reception Desk in the waiting room.            "

## 2022-09-20 NOTE — PROGRESS NOTES
Interventional Cardiology Clinic Note  Reason for Visit: PAD     HPI:     Mariann Huff is a 62 y.o. female with CAD s/p GINGER to ostial LAD in 2014, HTN, HLD, DM2, MURTAZA (not on CPAP) here for evaluation of her PAD.     Seen by Dr. Hi on 8/10/22. Pt has PAD with Erasmo stage 1-2 claudication symptoms. Symptoms have persisted despite maximal medical therapy with cilostazol 100 mg bid, ASA 81 mg daily, and atorvastatin 40 mg daily.  She has no rest pain or tissue loss to suggest CLI. She also has lumbar degenerative disc disease, which is contributing to her claudication symptoms.  MRI Lumbar Spine on 8/3/2022 revealed acute T12 compression fracture with mild depression of the superior endplate.  No significant retropulsion. Postoperative change of L5-S1 interbody and posterior instrumented fusion.      Pain in her right/ left calf gets worse with exertion. Right leg pain is greater than her left.She denies loss of sensation or any other issues at this time.     8/3/22 CTA   Right lower extremity:  The right common femoral artery is patent with moderate narrowing.  The right superficial femoral artery demonstrates multifocal  moderate to severe narrowing.  The right deep femoral artery demonstrates no hemodynamically significant stenosis.  The right popliteal artery demonstrates  moderate narrowing.  The anterior tibial and peroneal arteries are patent proximally.  The distal peroneal artery not well opacified and likely with severe atherosclerotic disease.  The posterior tibial artery is patent at the origin however there is likely severe atherosclerotic disease throughout its course.  The dominant supply to the right foot appears to be the anterior tibial artery.      Left lower extremity:  Left common femoral artery is patent.  Left superficial femoral artery demonstrates moderate to severe multifocal narrowing.  Left deep femoral artery demonstrates mild-to-moderate narrowing..  Left popliteal artery  demonstrates multifocal moderate to severe narrowing.  Patent origins of the left calf vessels.  There is moderate - severe atherosclerosis of the left calf vessels however they appear patent to the feet    ROS:    Constitution: Negative for fever, chills, weight loss or gain.   HENT: Negative for sore throat, rhinorrhea, or headache.  Eyes: Negative for blurred or double vision.   Cardiovascular: See above  Pulmonary: Negative for SOB   Gastrointestinal: Negative for abdominal pain, nausea, vomiting, or diarrhea.   : Negative for dysuria.   Neurological: Negative for focal weakness or sensory changes.  PMH:     Past Medical History:   Diagnosis Date    Anticoagulant long-term use     Asthma     Back pain     Bradycardia, unspecified 5/8/2019    The etiology of the bradycardic episode is unclear.  The have appear to be respiratory in origin (at least the 1st episode).  We will continue to monitor carefully.  We are awaiting evaluation by Cardiology.      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, with long-term current use of insulin 1/9/2020     Past Surgical History:   Procedure Laterality Date    ANTEGRADE NEPHROSTOGRAPHY Left 12/11/2019    Procedure: Nephrostogram - antegrade;  Surgeon: Robin Boyd MD;  Location: [facility] OR [address] [place] [address];  Service: Urology;  Laterality: Left;    BRONCHOSCOPY N/A 5/2/2019    Procedure: BRONCHOSCOPY;  Surgeon: Sean JOE  MD Alden;  [address];  Service: General;  Laterality: N/A;    CARDIAC CATHETERIZATION      CATARACT EXTRACTION      cataract extraction left eye      cataracts      CAUDAL EPIDURAL STEROID INJECTION N/A 2019    Procedure: Injection-steroid-epidural-caudal;  Surgeon: Dave Bentley Jr., MD;  Location: [facility] PAIN [facility]GT;  Service: Pain Management;  Laterality: N/A;     SECTION, LOW TRANSVERSE      COLONOSCOPY N/A 2019    Procedure: COLONOSCOPY;  Surgeon: Al Alaniz MD;  [facility]  Service: Endoscopy;  Laterality: N/A;    CORONARY ANGIOPLASTY      CYSTOGRAM N/A 2019    Procedure: CYSTOGRAM INTRAOP;  Surgeon: Robin Boyd MD;  Location: [address];  Service: Urology;  Laterality: N/A;  1 HOUR    CYSTOSCOPY W/ RETROGRADES Left 2019    Procedure: CYSTOSCOPY, WITH RETROGRADE PYELOGRAM;  Surgeon: Robin Boyd MD;  Location: [address];  Service: Urology;  Laterality: Left;  fluro    ESOPHAGOGASTRODUODENOSCOPY W/ PEG  2019    Procedure: EGD, WITH PEG TUBE INSERTION;  Surgeon: Sean Ruano MD;  Location: [address];  Service: General;;    EXCISION TURBINATE, SUBMUCOUS      FLEXIBLE SIGMOIDOSCOPY N/A 2019    Procedure: SIGMOIDOSCOPY, FLEXIBLE;  Surgeon: ALBERTO Amin MD;  Location: [facility] ([address] [place] [address]);  Service: Endoscopy;  Laterality: N/A;    FLEXIBLE SIGMOIDOSCOPY N/A 2019    Procedure: SIGMOIDOSCOPY, FLEXIBLE;  Surgeon: ALBERTO Amin MD;  Location: [facility] ([address] [place] [address]);  Service: Endoscopy;  Laterality: N/A;    FUSION OF LUMBAR SPINE BY ANTERIOR APPROACH Left 2019    Procedure: FUSION, SPINE, LUMBAR, ANTERIOR APPROACH Left L5-S1 Anterior to Psoa Interbody Fusion, L5-S1 Posterior Instrumentation;  Surgeon: Mk George MD;  Location: [address];  Service: Neurosurgery;  Laterality: Left;  Porcedure:  Left L5-S1 Anterior to Psoa Interbody Fusion, L5-S1 Posterior Instrumentation  Surgery Time: 4  Hrs  LOS: 2-3  Anesthesia: General   Blood: Type & Screen  R    HAND SURGERY Left     HAND SURGERY Right     torn ligament repair/ Dr. Yeboah    HYSTERECTOMY      INJECTION OF STEROID Right 12/10/2020    Procedure: INJECTION, STEROID Right SI Joint Block and Steroid Injection;  Surgeon: Mk George MD;  Location: [facility];  Service: Neurosurgery;  Laterality: Right;  Procedure: Right SI Joint Block and Steroid Injection   SUrgery Time: 30 Min  LOS: 0  Anesthesia: MAC  Radiology: C-arm  Bed: Tomer 4 Poster  Position: Prone    INJECTION OF STEROID Right 9/28/2021    Procedure: INJECTION, STEROID Right SI joint block & steroid injection;  Surgeon: Mk George MD;  Location: [facility];  Service: Neurosurgery;  Laterality: Right;  Procedure: Right SI joint block & steroid injection  Surgery Time: 30m  Anesthesia: Local MAC  Radiology: C-arm  Bed: Regular  Position: Prone    left foot surgery      left wrist surgery      LYSIS OF ADHESIONS N/A 2/19/2020    Procedure: LYSIS, ADHESIONS;  Surgeon: Robin Boyd MD;  Location: [address];  Service: Urology;  Laterality: N/A;    NASAL SEPTUM SURGERY  5/7/15    PERCUTANEOUS NEPHROSTOMY Left 4/21/2019    Procedure: Creation, Nephrostomy, Percutaneous;  Surgeon: Karina Surgeon;  Location: [facility];  Service: Anesthesiology;  Laterality: Left;    REPAIR OF URETER  4/12/2019    Procedure: REPAIR, URETER;  Surgeon: Mk George MD;  [address];  Service: Neurosurgery;;    REPLACEMENT OF NEPHROSTOMY TUBE N/A 7/18/2019    Procedure: REPLACEMENT, NEPHROSTOMY TUBE;  Surgeon: [facility] Diagnostic Provider;  [address];  Service: Anesthesiology;  Laterality: N/A;  188    REPLACEMENT OF NEPHROSTOMY TUBE N/A 7/24/2019    Procedure: REPLACEMENT, NEPHROSTOMY TUBE;  Surgeon: [facility] Diagnostic Provider;  [facility];  Service: Anesthesiology;  Laterality: N/A;  188    REPLACEMENT OF NEPHROSTOMY TUBE N/A 10/7/2019    Procedure:  REPLACEMENT, NEPHROSTOMY TUBE;  Surgeon: [place] Diagnostic Provider;  Location: [address];  Service: Anesthesiology;  Laterality: N/A;  189    REPLACEMENT OF NEPHROSTOMY TUBE N/A 11/25/2019    Procedure: REPLACEMENT, NEPHROSTOMY TUBE;  Surgeon: Leo Diagnostic Provider;  Location: [facility] OR [address] [place] [address];  Service: Anesthesiology;  Laterality: N/A;  Room 188/Bessy    REPLACEMENT OF NEPHROSTOMY TUBE Right 2/19/2020    Procedure: REPLACEMENT, NEPHROSTOMY TUBE removal removal;  Surgeon: Robin Boyd MD;  Location: [facility] OR [address] [place] [address];  Service: Urology;  Laterality: Right;    rt elbow surgery      S/P LAD COATED STENT  05/14/2010    6 total     S/P OM1 STENT  08/2003    SINUS SURGERY      F.E.S.S.    TRACHEOSTOMY N/A 5/2/2019    Procedure: CREATION, TRACHEOSTOMY;  Surgeon: Sean Ruano MD;  Location: [address];  Service: General;  Laterality: N/A;    TUBAL LIGATION      URETERAL REIMPLANTION Left 2/19/2020    Procedure: REIMPLANTATION, URETER WITH PSOAS HITCH;  Surgeon: Robin Boyd MD;  Location: [address];  Service: Urology;  Laterality: Left;     Allergies:     Review of patient's allergies indicates:   Allergen Reactions    Milk containing products      Causes GI distress     Medications:     Current Outpatient Medications on File Prior to Visit   Medication Sig Dispense Refill    ACCU-CHEK EDIN PLUS METER Misc       albuterol (PROVENTIL/VENTOLIN HFA) 90 mcg/actuation inhaler Inhale 2 puffs into the lungs every 6 (six) hours as needed for Wheezing. 1 g 3    albuterol-ipratropium (DUO-NEB) 2.5 mg-0.5 mg/3 mL nebulizer solution Inhale 3 mLs into the lungs.      amLODIPine (NORVASC) 10 MG tablet Take 1 tablet (10 mg total) by mouth once daily. 90 tablet 2    aspirin 81 mg Tab Take 81 mg by mouth. 1 Tablet Oral Every day      atorvastatin (LIPITOR) 40 MG tablet Take 1 tablet (40 mg total) by mouth every evening. 90 tablet 3    blood sugar diagnostic (ACCU-CHEK EDIN PLUS TEST STRP) Strp TEST BLOOD SUGARS 4  "TIMES DAILY 150 strip 11    cilostazoL (PLETAL) 100 MG Tab Take 1 tablet (100 mg total) by mouth 2 (two) times daily. 60 tablet 11    dapagliflozin (FARXIGA) 10 mg tablet Take 1 tablet (10 mg total) by mouth once daily. 90 tablet 1    diclofenac sodium (VOLTAREN) 1 % Gel Apply 2 g topically 3 (three) times daily. Apply to the area of pain 2-3x per day or night as needed 100 g 3    EScitalopram oxalate (LEXAPRO) 10 MG tablet Take 1 tablet (10 mg total) by mouth once daily. 90 tablet 0    eszopiclone (LUNESTA) 2 MG Tab Take 1 tablet (2 mg total) by mouth every evening. 30 tablet 0    evolocumab (REPATHA SURECLICK) 140 mg/mL PnIj Inject 1 mL (140 mg total) into the skin every 14 (fourteen) days. 2 mL 6    hydroCHLOROthiazide (HYDRODIURIL) 12.5 MG Tab Take 1 tablet (12.5 mg total) by mouth once daily. 90 tablet 3    isosorbide mononitrate (ISMO,MONOKET) 10 mg tablet Take 1 tablet (10 mg total) by mouth once daily. 90 tablet 2    metoprolol tartrate (LOPRESSOR) 50 MG tablet TAKE 1 TABLET BY MOUTH TWICE A  tablet 1    multivitamin (THERAGRAN) per tablet Take 1 tablet by mouth once daily.      pen needle, diabetic (NOVOTWIST) 32 gauge x 1/5" Ndle Use 1 needle to inject insulin four times daily 400 each 2    potassium chloride SA (K-DUR,KLOR-CON) 20 MEQ tablet Take 1 tablet (20 mEq total) by mouth 2 (two) times daily. 60 tablet 11    promethazine (PHENERGAN) 12.5 MG Tab Take 1 tablet (12.5 mg total) by mouth every 8 (eight) hours as needed (nausea). 30 tablet 1    telmisartan (MICARDIS) 80 MG Tab Take 1 tablet (80 mg total) by mouth once daily. Stop losartan 90 tablet 0     No current facility-administered medications on file prior to visit.     Social History:     Social History     Tobacco Use    Smoking status: Never    Smokeless tobacco: Never   Substance Use Topics    Alcohol use: No     Alcohol/week: 0.0 standard drinks     Family History:     Family History   Problem Relation Age of Onset    Diabetes Mother  "    Heart disease Mother     Diabetes Father     Leukemia Father         leukemia    Heart attack Father     Diabetes Sister     Diabetes Brother     Diabetes Sister     No Known Problems Sister     No Known Problems Brother     No Known Problems Brother     No Known Problems Maternal Grandmother     No Known Problems Maternal Grandfather     No Known Problems Paternal Grandmother     No Known Problems Paternal Grandfather     No Known Problems Son     No Known Problems Son     No Known Problems Maternal Aunt     No Known Problems Maternal Uncle     No Known Problems Paternal Aunt     No Known Problems Paternal Uncle     Colon cancer Neg Hx     Inflammatory bowel disease Neg Hx     Melanoma Neg Hx     Psoriasis Neg Hx     Lupus Neg Hx     Eczema Neg Hx     Acne Neg Hx     Amblyopia Neg Hx     Blindness Neg Hx     Cancer Neg Hx     Cataracts Neg Hx     Glaucoma Neg Hx     Hypertension Neg Hx     Macular degeneration Neg Hx     Retinal detachment Neg Hx     Strabismus Neg Hx     Stroke Neg Hx     Thyroid disease Neg Hx     Heart failure Neg Hx     Hyperlipidemia Neg Hx      Physical Exam:   LMP  (LMP Unknown)    Wt Readings from Last 4 Encounters:   08/30/22 71.7 kg (158 lb 2.9 oz)   08/23/22 72.3 kg (159 lb 6.3 oz)   08/10/22 71.8 kg (158 lb 4.6 oz)   07/29/22 72.1 kg (158 lb 15.2 oz)         Constitutional: No distress, conversant  HEENT: Sclera anicteric, PERRLA, EOMI  Neck: No JVD, no masses, good movement  CV: RRR, S1 and S2 normal, no additional heart sounds or murmurs. Pulses 2+ and equal bilaterally in radial arteries, Monty's normal on right. Distal pulses are 2+ and equal in the femoral, DP and PT areas bilaterally  Pulm: Clear to auscultation bilaterally with symmetrical expansion. Chest wall palpated for reproduction of pain symptoms, and no pain was able to be produced on palpation or resistance exercises  GI: Abdomen soft, non-tender, good bowel sounds  Extremities: Both extremities intact and grossly  normal, skin is warm, no edema noted  Skin: No ecchymosis, erythema, or ulcers  Psych: AOx3, appropriate affect  Neuro: CNII-XII intact, no focal deficits      Labs:     Lab Results   Component Value Date     08/23/2022    K 4.6 08/23/2022     08/23/2022    CO2 28 08/23/2022    BUN 23 08/23/2022    CREATININE 1.2 08/23/2022    ANIONGAP 9 08/23/2022     Lab Results   Component Value Date    HGBA1C 8.2 (H) 05/17/2022     Lab Results   Component Value Date    BNP 1,403 (H) 06/06/2019    BNP <10 08/09/2016    BNP 10 02/17/2014    Lab Results   Component Value Date    WBC 8.59 08/23/2022    HGB 12.6 08/23/2022    HCT 41.2 08/23/2022    HCT 19 (LL) 05/19/2019     08/23/2022    GRAN 4.8 08/23/2022    GRAN 55.7 08/23/2022     Lab Results   Component Value Date    CHOL 114 (L) 06/14/2022    HDL 46 06/14/2022    LDLCALC 42.4 (L) 06/14/2022    TRIG 128 06/14/2022          Imaging:       TTE: Normal left ventricular systolic function. The estimated ejection fraction is 55%.  Normal LV diastolic function.  Normal right ventricular systolic function.  The estimated PA systolic pressure is 17 mmHg.  Normal central venous pressure (3 mmHg).  Coronary Angiography: Reviewed      Assessment:   Mariann Huff is a 62 y.o. female with CAD s/p GINGER to ostial LAD in 2014, HTN, HLD, DM2, MURTAZA (not on CPAP) here for evaluation of her PAD.     Plan:     1. Claudication of both lower extremities  2. PAD (peripheral artery disease)  Given continued symptoms despite maximal medical therapy for PAD, will plan for BLE angio/intervention.  R>L pain   Continue aspirin and high-intensity statin.    Continue walking program goal of at least 30 minutes per day, 5 days per week.           - B/L LE angiogram planned. Possible intervention of RLE given pain worse in that leg.   - Anti-platelet Therapy: ASA   - Access: Left SFA  - Creatinine/CrCl: 1.2  - Allergies: No shellfish / Iodine allergy  - Pre-Hydration: NS  - Pre-Op Med:  Bendaryl 50mg pO   - All patient's questions were answered.  -The risks, benefits and alternatives of the procedure were explained to the patient.   - Additionally, pt is aware that non-compliance is likely to result in stent clotting with heart attack, heart failure, and/or death  -The risks of moderate sedation include hypotension, respiratory depression, arrhythmias, bronchospasm, and death.   - Informed consent was obtained and the  patient is agreeable to proceed with the procedure.       Keep a home BP diary and bring to next visit  Discussed mediterranean diet  Discussed exercise therapy based on 150-min per week AHA recommendations for moderate intensity exercise/75 min for high-intensity exercise    Signed:  Louis Dubois MD  Cardiology Fellow  Pager - [phone]  [facility]  9/20/2022 9:07 AM   Interventional Cardiology Staff  I have personally taken the history and examined this patient. I have discussed and agree with the resident's findings and plan as documented in the resident's note.  PA D right worse than left which has not responded to cilastazole and walking program.  Patient was referred by Dr. Fry for angiography and possible angioplasty.    Aime George

## 2022-09-23 ENCOUNTER — OFFICE VISIT (OUTPATIENT)
Dept: UROLOGY | Facility: CLINIC | Age: 62
End: 2022-09-23
Payer: COMMERCIAL

## 2022-09-23 VITALS
HEIGHT: 64 IN | HEART RATE: 85 BPM | SYSTOLIC BLOOD PRESSURE: 149 MMHG | WEIGHT: 155 LBS | DIASTOLIC BLOOD PRESSURE: 85 MMHG | BODY MASS INDEX: 26.46 KG/M2

## 2022-09-23 DIAGNOSIS — N28.89 RENAL MASS, RIGHT: ICD-10-CM

## 2022-09-23 DIAGNOSIS — N13.30 HYDRONEPHROSIS OF LEFT KIDNEY: Primary | ICD-10-CM

## 2022-09-23 DIAGNOSIS — N28.1 BILATERAL RENAL CYSTS: ICD-10-CM

## 2022-09-23 PROCEDURE — 99214 PR OFFICE/OUTPT VISIT, EST, LEVL IV, 30-39 MIN: ICD-10-PCS | Mod: S$GLB,,, | Performed by: UROLOGY

## 2022-09-23 PROCEDURE — 1159F MED LIST DOCD IN RCRD: CPT | Mod: CPTII,S$GLB,, | Performed by: UROLOGY

## 2022-09-23 PROCEDURE — 3052F HG A1C>EQUAL 8.0%<EQUAL 9.0%: CPT | Mod: CPTII,S$GLB,, | Performed by: UROLOGY

## 2022-09-23 PROCEDURE — 4010F PR ACE/ARB THEARPY RXD/TAKEN: ICD-10-PCS | Mod: CPTII,S$GLB,, | Performed by: UROLOGY

## 2022-09-23 PROCEDURE — 3008F BODY MASS INDEX DOCD: CPT | Mod: CPTII,S$GLB,, | Performed by: UROLOGY

## 2022-09-23 PROCEDURE — 99214 OFFICE O/P EST MOD 30 MIN: CPT | Mod: S$GLB,,, | Performed by: UROLOGY

## 2022-09-23 PROCEDURE — 1159F PR MEDICATION LIST DOCUMENTED IN MEDICAL RECORD: ICD-10-PCS | Mod: CPTII,S$GLB,, | Performed by: UROLOGY

## 2022-09-23 PROCEDURE — 3008F PR BODY MASS INDEX (BMI) DOCUMENTED: ICD-10-PCS | Mod: CPTII,S$GLB,, | Performed by: UROLOGY

## 2022-09-23 PROCEDURE — 3072F PR LOW RISK FOR RETINOPATHY: ICD-10-PCS | Mod: CPTII,S$GLB,, | Performed by: UROLOGY

## 2022-09-23 PROCEDURE — 3079F DIAST BP 80-89 MM HG: CPT | Mod: CPTII,S$GLB,, | Performed by: UROLOGY

## 2022-09-23 PROCEDURE — 3079F PR MOST RECENT DIASTOLIC BLOOD PRESSURE 80-89 MM HG: ICD-10-PCS | Mod: CPTII,S$GLB,, | Performed by: UROLOGY

## 2022-09-23 PROCEDURE — 99999 PR PBB SHADOW E&M-EST. PATIENT-LVL IV: ICD-10-PCS | Mod: PBBFAC,,, | Performed by: UROLOGY

## 2022-09-23 PROCEDURE — 4010F ACE/ARB THERAPY RXD/TAKEN: CPT | Mod: CPTII,S$GLB,, | Performed by: UROLOGY

## 2022-09-23 PROCEDURE — 3052F PR MOST RECENT HEMOGLOBIN A1C LEVEL 8.0 - < 9.0%: ICD-10-PCS | Mod: CPTII,S$GLB,, | Performed by: UROLOGY

## 2022-09-23 PROCEDURE — 3072F LOW RISK FOR RETINOPATHY: CPT | Mod: CPTII,S$GLB,, | Performed by: UROLOGY

## 2022-09-23 PROCEDURE — 99999 PR PBB SHADOW E&M-EST. PATIENT-LVL IV: CPT | Mod: PBBFAC,,, | Performed by: UROLOGY

## 2022-09-23 PROCEDURE — 3077F PR MOST RECENT SYSTOLIC BLOOD PRESSURE >= 140 MM HG: ICD-10-PCS | Mod: CPTII,S$GLB,, | Performed by: UROLOGY

## 2022-09-23 PROCEDURE — 3077F SYST BP >= 140 MM HG: CPT | Mod: CPTII,S$GLB,, | Performed by: UROLOGY
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
## 2022-09-23 NOTE — HOSPITAL COURSE
4/13: POD 1. JADA ON, AF VSS. H+H stable, MADHURI with 2.5mL output. Has correa in place per urology recs. Has been drinking only since surgery without n/v and has not yet been out of bed.   
0 = swallows foods/liquids without difficulty

## 2022-09-26 RX ORDER — ESCITALOPRAM OXALATE 10 MG/1
10 TABLET ORAL DAILY
Qty: 90 TABLET | Refills: 2 | Status: ON HOLD | OUTPATIENT
Start: 2022-09-26 | End: 2023-09-01 | Stop reason: HOSPADM

## 2022-09-26 RX ORDER — ESZOPICLONE 2 MG/1
2 TABLET, FILM COATED ORAL NIGHTLY
Qty: 30 TABLET | Refills: 0 | Status: SHIPPED | OUTPATIENT
Start: 2022-09-26 | End: 2022-10-24 | Stop reason: SDUPTHER

## 2022-09-28 ENCOUNTER — HOSPITAL ENCOUNTER (OUTPATIENT)
Dept: RADIOLOGY | Facility: HOSPITAL | Age: 62
Discharge: HOME | End: 2022-09-28
Attending: PSYCHIATRY & NEUROLOGY
Payer: COMMERCIAL

## 2022-09-28 DIAGNOSIS — R55 SYNCOPE AND COLLAPSE: ICD-10-CM

## 2022-09-28 PROCEDURE — 70544 MR ANGIOGRAPHY HEAD W/O DYE: CPT | Mod: TC

## 2022-09-28 PROCEDURE — 70551 MRI BRAIN STEM W/O DYE: CPT | Mod: 26,,, | Performed by: RADIOLOGY

## 2022-09-28 PROCEDURE — 70544 MR ANGIOGRAPHY HEAD W/O DYE: CPT | Mod: 26,59,, | Performed by: RADIOLOGY

## 2022-09-28 PROCEDURE — 70544 MRA BRAIN WITHOUT CONTRAST: ICD-10-PCS | Mod: 26,59,, | Performed by: RADIOLOGY

## 2022-09-28 PROCEDURE — 70551 MRI BRAIN WITHOUT CONTRAST: ICD-10-PCS | Mod: 26,,, | Performed by: RADIOLOGY

## 2022-09-28 PROCEDURE — 70551 MRI BRAIN STEM W/O DYE: CPT | Mod: TC

## 2022-10-05 RX ORDER — FLUCONAZOLE 150 MG/1
150 TABLET ORAL ONCE
Qty: 2 TABLET | Refills: 0 | OUTPATIENT
Start: 2022-10-05 | End: 2022-10-05

## 2022-10-06 ENCOUNTER — HOSPITAL ENCOUNTER (OUTPATIENT)
Dept: RADIOLOGY | Facility: HOSPITAL | Age: 62
Discharge: HOME | End: 2022-10-06
Attending: UROLOGY
Payer: COMMERCIAL

## 2022-10-06 DIAGNOSIS — N28.1 BILATERAL RENAL CYSTS: ICD-10-CM

## 2022-10-06 DIAGNOSIS — N13.30 HYDRONEPHROSIS OF LEFT KIDNEY: ICD-10-CM

## 2022-10-06 PROCEDURE — 76770 US EXAM ABDO BACK WALL COMP: CPT | Mod: 26,,, | Performed by: RADIOLOGY

## 2022-10-06 PROCEDURE — 76770 US EXAM ABDO BACK WALL COMP: CPT | Mod: TC

## 2022-10-06 PROCEDURE — 76770 US RETROPERITONEAL COMPLETE: ICD-10-PCS | Mod: 26,,, | Performed by: RADIOLOGY

## 2022-10-10 ENCOUNTER — SPECIALTY PHARMACY (OUTPATIENT)
Dept: PHARMACY | Facility: CLINIC | Age: 62
End: 2022-10-10
Payer: COMMERCIAL

## 2022-10-11 ENCOUNTER — PATIENT MESSAGE (OUTPATIENT)
Dept: INTERNAL MEDICINE | Facility: CLINIC | Age: 62
End: 2022-10-11
Payer: COMMERCIAL

## 2022-10-11 ENCOUNTER — OFFICE VISIT (OUTPATIENT)
Dept: UROLOGY | Facility: CLINIC | Age: 62
End: 2022-10-11
Payer: COMMERCIAL

## 2022-10-11 VITALS
DIASTOLIC BLOOD PRESSURE: 83 MMHG | WEIGHT: 155 LBS | BODY MASS INDEX: 26.46 KG/M2 | HEIGHT: 64 IN | HEART RATE: 75 BPM | SYSTOLIC BLOOD PRESSURE: 140 MMHG

## 2022-10-11 DIAGNOSIS — N18.31 CHRONIC KIDNEY DISEASE, STAGE 3A: ICD-10-CM

## 2022-10-11 DIAGNOSIS — Z98.890 S/P URETERAL REIMPLANTATION: Primary | ICD-10-CM

## 2022-10-11 DIAGNOSIS — N28.1 BILATERAL RENAL CYSTS: ICD-10-CM

## 2022-10-11 PROCEDURE — 3072F LOW RISK FOR RETINOPATHY: CPT | Mod: CPTII,S$GLB,, | Performed by: UROLOGY

## 2022-10-11 PROCEDURE — 4010F PR ACE/ARB THEARPY RXD/TAKEN: ICD-10-PCS | Mod: CPTII,S$GLB,, | Performed by: UROLOGY

## 2022-10-11 PROCEDURE — 3008F PR BODY MASS INDEX (BMI) DOCUMENTED: ICD-10-PCS | Mod: CPTII,S$GLB,, | Performed by: UROLOGY

## 2022-10-11 PROCEDURE — 3072F PR LOW RISK FOR RETINOPATHY: ICD-10-PCS | Mod: CPTII,S$GLB,, | Performed by: UROLOGY

## 2022-10-11 PROCEDURE — 3079F DIAST BP 80-89 MM HG: CPT | Mod: CPTII,S$GLB,, | Performed by: UROLOGY

## 2022-10-11 PROCEDURE — 1159F PR MEDICATION LIST DOCUMENTED IN MEDICAL RECORD: ICD-10-PCS | Mod: CPTII,S$GLB,, | Performed by: UROLOGY

## 2022-10-11 PROCEDURE — 3077F PR MOST RECENT SYSTOLIC BLOOD PRESSURE >= 140 MM HG: ICD-10-PCS | Mod: CPTII,S$GLB,, | Performed by: UROLOGY

## 2022-10-11 PROCEDURE — 4010F ACE/ARB THERAPY RXD/TAKEN: CPT | Mod: CPTII,S$GLB,, | Performed by: UROLOGY

## 2022-10-11 PROCEDURE — 1159F MED LIST DOCD IN RCRD: CPT | Mod: CPTII,S$GLB,, | Performed by: UROLOGY

## 2022-10-11 PROCEDURE — 3008F BODY MASS INDEX DOCD: CPT | Mod: CPTII,S$GLB,, | Performed by: UROLOGY

## 2022-10-11 PROCEDURE — 99214 OFFICE O/P EST MOD 30 MIN: CPT | Mod: S$GLB,,, | Performed by: UROLOGY

## 2022-10-11 PROCEDURE — 3052F HG A1C>EQUAL 8.0%<EQUAL 9.0%: CPT | Mod: CPTII,S$GLB,, | Performed by: UROLOGY

## 2022-10-11 PROCEDURE — 99214 PR OFFICE/OUTPT VISIT, EST, LEVL IV, 30-39 MIN: ICD-10-PCS | Mod: S$GLB,,, | Performed by: UROLOGY

## 2022-10-11 PROCEDURE — 99999 PR PBB SHADOW E&M-EST. PATIENT-LVL IV: ICD-10-PCS | Mod: PBBFAC,,, | Performed by: UROLOGY

## 2022-10-11 PROCEDURE — 3052F PR MOST RECENT HEMOGLOBIN A1C LEVEL 8.0 - < 9.0%: ICD-10-PCS | Mod: CPTII,S$GLB,, | Performed by: UROLOGY

## 2022-10-11 PROCEDURE — 3077F SYST BP >= 140 MM HG: CPT | Mod: CPTII,S$GLB,, | Performed by: UROLOGY

## 2022-10-11 PROCEDURE — 99999 PR PBB SHADOW E&M-EST. PATIENT-LVL IV: CPT | Mod: PBBFAC,,, | Performed by: UROLOGY

## 2022-10-11 PROCEDURE — 3079F PR MOST RECENT DIASTOLIC BLOOD PRESSURE 80-89 MM HG: ICD-10-PCS | Mod: CPTII,S$GLB,, | Performed by: UROLOGY

## 2022-10-17 ENCOUNTER — PATIENT MESSAGE (OUTPATIENT)
Dept: CARDIOLOGY | Facility: CLINIC | Age: 62
End: 2022-10-17
Payer: COMMERCIAL

## 2022-10-17 NOTE — TELEPHONE ENCOUNTER
Specialty Pharmacy - Refill Coordination    Specialty Medication Orders Linked to Encounter      Flowsheet Row Most Recent Value   Medication #1 evolocumab (REPATHA SURECLICK) 140 mg/mL PnIj (Order#[id number], Rx#[id number])            Refill Questions - Documented Responses      Flowsheet Row Most Recent Value   Patient Availability and HIPAA Verification    Does patient want to proceed with activity? Yes   HIPAA/medical authority confirmed? Yes   Relationship to patient of person spoken to? Self   Refill Screening Questions    Would patient like to speak to a pharmacist? No   When does the patient need to receive the medication? 10/24/22   Refill Delivery Questions    How will the patient receive the medication? MEDRx   When does the patient need to receive the medication? 10/24/22   Shipping Address Home   Address in [facility] confirmed and updated if neccessary? Yes   Expected Copay ($) 0   Is the patient able to afford the medication copay? Yes   Payment Method zero copay   Days supply of Refill 28   Supplies needed? No supplies needed   Refill activity completed? Yes   Refill activity plan Refill scheduled   Shipment/Pickup Date: 10/20/22            Current Outpatient Medications   Medication Sig    ACCU-CHEK EDIN PLUS METER Misc     albuterol (PROVENTIL/VENTOLIN HFA) 90 mcg/actuation inhaler Inhale 2 puffs into the lungs every 6 (six) hours as needed for Wheezing.    albuterol-ipratropium (DUO-NEB) 2.5 mg-0.5 mg/3 mL nebulizer solution Inhale 3 mLs into the lungs.    amLODIPine (NORVASC) 10 MG tablet Take 1 tablet (10 mg total) by mouth once daily.    aspirin 81 mg Tab Take 81 mg by mouth. 1 Tablet Oral Every day    atorvastatin (LIPITOR) 40 MG tablet Take 1 tablet (40 mg total) by mouth every evening.    blood sugar diagnostic (ACCU-CHEK EDIN PLUS TEST STRP) Strp TEST BLOOD SUGARS 4 TIMES DAILY    cilostazoL (PLETAL) 100 MG Tab Take 1 tablet (100 mg total) by mouth 2 (two) times daily.     "diclofenac sodium (VOLTAREN) 1 % Gel Apply 2 g topically 3 (three) times daily. Apply to the area of pain 2-3x per day or night as needed    EScitalopram oxalate (LEXAPRO) 10 MG tablet Take 1 tablet (10 mg total) by mouth once daily.    eszopiclone (LUNESTA) 2 MG Tab Take 1 tablet (2 mg total) by mouth every evening.    evolocumab (REPATHA SURECLICK) 140 mg/mL PnIj Inject 1 mL (140 mg total) into the skin every 14 (fourteen) days.    hydroCHLOROthiazide (HYDRODIURIL) 12.5 MG Tab Take 1 tablet (12.5 mg total) by mouth once daily.    isosorbide mononitrate (ISMO,MONOKET) 10 mg tablet Take 1 tablet (10 mg total) by mouth once daily.    metoprolol tartrate (LOPRESSOR) 50 MG tablet TAKE 1 TABLET BY MOUTH TWICE A DAY    multivitamin (THERAGRAN) per tablet Take 1 tablet by mouth once daily.    pen needle, diabetic (NOVOTWIST) 32 gauge x 1/5" Ndle Use 1 needle to inject insulin four times daily    potassium chloride SA (K-DUR,KLOR-CON) 20 MEQ tablet Take 1 tablet (20 mEq total) by mouth 2 (two) times daily.    promethazine (PHENERGAN) 12.5 MG Tab Take 1 tablet (12.5 mg total) by mouth every 8 (eight) hours as needed (nausea).    telmisartan (MICARDIS) 80 MG Tab Take 1 tablet (80 mg total) by mouth once daily. Stop losartan   Last reviewed on 10/11/2022  9:16 AM by Vincenzo Mireles MA    Review of patient's allergies indicates:   Allergen Reactions    Milk containing products      Causes GI distress    Last reviewed on  10/11/2022 9:16 AM by Vincenzo Mireles      Tasks added this encounter   11/14/2022 - Refill Call (Auto Added)   Tasks due within next 3 months   No tasks due.     Hiral Jackson, PharmD  [facility]  [address] [place] [id number]  Phone: [phone]  Fax: [phone]        "

## 2022-10-19 ENCOUNTER — TELEPHONE (OUTPATIENT)
Dept: CARDIOLOGY | Facility: CLINIC | Age: 62
End: 2022-10-19
Payer: COMMERCIAL

## 2022-10-19 DIAGNOSIS — E78.5 HYPERLIPIDEMIA ASSOCIATED WITH TYPE 2 DIABETES MELLITUS: ICD-10-CM

## 2022-10-19 DIAGNOSIS — E11.51 TYPE 2 DIABETES MELLITUS WITH DIABETIC PERIPHERAL ANGIOPATHY WITHOUT GANGRENE, WITHOUT LONG-TERM CURRENT USE OF INSULIN: Primary | ICD-10-CM

## 2022-10-19 DIAGNOSIS — I15.2 HYPERTENSION ASSOCIATED WITH DIABETES: ICD-10-CM

## 2022-10-19 DIAGNOSIS — E11.59 HYPERTENSION ASSOCIATED WITH DIABETES: ICD-10-CM

## 2022-10-19 DIAGNOSIS — E11.69 HYPERLIPIDEMIA ASSOCIATED WITH TYPE 2 DIABETES MELLITUS: ICD-10-CM

## 2022-10-24 DIAGNOSIS — I15.2 HYPERTENSION ASSOCIATED WITH DIABETES: ICD-10-CM

## 2022-10-24 DIAGNOSIS — E11.59 HYPERTENSION ASSOCIATED WITH DIABETES: ICD-10-CM

## 2022-10-25 RX ORDER — ESZOPICLONE 2 MG/1
2 TABLET, FILM COATED ORAL NIGHTLY
Qty: 30 TABLET | Refills: 0 | Status: SHIPPED | OUTPATIENT
Start: 2022-10-25 | End: 2022-11-21 | Stop reason: SDUPTHER

## 2022-10-25 RX ORDER — TELMISARTAN 80 MG/1
80 TABLET ORAL DAILY
Qty: 90 TABLET | Refills: 2 | Status: ON HOLD | OUTPATIENT
Start: 2022-10-25 | End: 2023-06-25 | Stop reason: HOSPADM

## 2022-10-26 ENCOUNTER — TELEPHONE (OUTPATIENT)
Dept: CARDIOLOGY | Facility: CLINIC | Age: 62
End: 2022-10-26
Payer: COMMERCIAL

## 2022-10-26 NOTE — TELEPHONE ENCOUNTER
----- Message from Adelaide Aguilar MD sent at 10/26/2022  1:46 PM CDT -----  Please inform the patient that except for uncontrolled Diabetes all other labs look good.  She hsoudl discuss it with Dr. Haney.

## 2022-10-27 ENCOUNTER — HOSPITAL ENCOUNTER (OUTPATIENT)
Facility: HOSPITAL | Age: 62
Discharge: HOME | End: 2022-10-27
Attending: INTERNAL MEDICINE | Admitting: INTERNAL MEDICINE
Payer: COMMERCIAL

## 2022-10-27 VITALS
DIASTOLIC BLOOD PRESSURE: 67 MMHG | BODY MASS INDEX: 27.14 KG/M2 | RESPIRATION RATE: 16 BRPM | HEIGHT: 64 IN | HEART RATE: 67 BPM | TEMPERATURE: 98 F | WEIGHT: 159 LBS | SYSTOLIC BLOOD PRESSURE: 145 MMHG | OXYGEN SATURATION: 100 %

## 2022-10-27 DIAGNOSIS — I73.9 CLAUDICATION OF BOTH LOWER EXTREMITIES: ICD-10-CM

## 2022-10-27 DIAGNOSIS — I73.9 PAD (PERIPHERAL ARTERY DISEASE): Primary | ICD-10-CM

## 2022-10-27 DIAGNOSIS — I73.9 PVD (PERIPHERAL VASCULAR DISEASE) WITH CLAUDICATION: ICD-10-CM

## 2022-10-27 LAB
ABO + RH BLD: NORMAL
BLD GP AB SCN CELLS X3 SERPL QL: NORMAL
POCT GLUCOSE: 133 MG/DL (ref 70–110)
POCT GLUCOSE: 182 MG/DL (ref 70–110)

## 2022-10-27 PROCEDURE — 25000003 PHARM REV CODE 250: Performed by: INTERNAL MEDICINE

## 2022-10-27 PROCEDURE — 75716 PR  ANGIO EXTERMITY BILAT: ICD-10-PCS | Mod: 26,,, | Performed by: INTERNAL MEDICINE

## 2022-10-27 PROCEDURE — 75625 CONTRAST EXAM ABDOMINL AORTA: CPT | Performed by: INTERNAL MEDICINE

## 2022-10-27 PROCEDURE — 36200 PLACE CATHETER IN AORTA: CPT | Performed by: INTERNAL MEDICINE

## 2022-10-27 PROCEDURE — 75625 CONTRAST EXAM ABDOMINL AORTA: CPT | Mod: 26,,, | Performed by: INTERNAL MEDICINE

## 2022-10-27 PROCEDURE — 75716 ARTERY X-RAYS ARMS/LEGS: CPT | Mod: 26,,, | Performed by: INTERNAL MEDICINE

## 2022-10-27 PROCEDURE — 25500020 PHARM REV CODE 255: Performed by: INTERNAL MEDICINE

## 2022-10-27 PROCEDURE — 99152 MOD SED SAME PHYS/QHP 5/>YRS: CPT | Performed by: INTERNAL MEDICINE

## 2022-10-27 PROCEDURE — C1769 GUIDE WIRE: HCPCS | Performed by: INTERNAL MEDICINE

## 2022-10-27 PROCEDURE — 75625 PR  ANGIO AORTOGRAM ABD SERIAL: ICD-10-PCS | Mod: 26,,, | Performed by: INTERNAL MEDICINE

## 2022-10-27 PROCEDURE — 99153 MOD SED SAME PHYS/QHP EA: CPT | Performed by: INTERNAL MEDICINE

## 2022-10-27 PROCEDURE — C1894 INTRO/SHEATH, NON-LASER: HCPCS | Performed by: INTERNAL MEDICINE

## 2022-10-27 PROCEDURE — 63600175 PHARM REV CODE 636 W HCPCS: Performed by: INTERNAL MEDICINE

## 2022-10-27 PROCEDURE — 36200 PLACE CATHETER IN AORTA: CPT | Mod: ,,, | Performed by: INTERNAL MEDICINE

## 2022-10-27 PROCEDURE — 75716 ARTERY X-RAYS ARMS/LEGS: CPT | Performed by: INTERNAL MEDICINE

## 2022-10-27 PROCEDURE — 86901 BLOOD TYPING SEROLOGIC RH(D): CPT | Performed by: INTERNAL MEDICINE

## 2022-10-27 PROCEDURE — 36200 PR PLACE CATH AORTA: ICD-10-PCS | Mod: ,,, | Performed by: INTERNAL MEDICINE

## 2022-10-27 RX ORDER — SODIUM CHLORIDE 9 MG/ML
INJECTION, SOLUTION INTRAVENOUS CONTINUOUS
Status: ACTIVE | OUTPATIENT
Start: 2022-10-27 | End: 2022-10-27

## 2022-10-27 RX ORDER — FENTANYL CITRATE 50 UG/ML
INJECTION, SOLUTION INTRAMUSCULAR; INTRAVENOUS
Status: DISCONTINUED | OUTPATIENT
Start: 2022-10-27 | End: 2022-10-27 | Stop reason: HOSPADM

## 2022-10-27 RX ORDER — MIDAZOLAM HYDROCHLORIDE 2 MG/2ML
INJECTION, SOLUTION INTRAMUSCULAR; INTRAVENOUS
Status: DISCONTINUED | OUTPATIENT
Start: 2022-10-27 | End: 2022-10-27 | Stop reason: HOSPADM

## 2022-10-27 RX ORDER — ONDANSETRON 8 MG/1
8 TABLET, ORALLY DISINTEGRATING ORAL EVERY 8 HOURS PRN
Status: DISCONTINUED | OUTPATIENT
Start: 2022-10-27 | End: 2022-10-27 | Stop reason: HOSPADM

## 2022-10-27 RX ORDER — ACETAMINOPHEN 325 MG/1
650 TABLET ORAL EVERY 4 HOURS PRN
Status: DISCONTINUED | OUTPATIENT
Start: 2022-10-27 | End: 2022-10-27 | Stop reason: HOSPADM

## 2022-10-27 RX ORDER — DIPHENHYDRAMINE HCL 50 MG
50 CAPSULE ORAL ONCE
Status: COMPLETED | OUTPATIENT
Start: 2022-10-27 | End: 2022-10-27

## 2022-10-27 RX ORDER — IODIXANOL 320 MG/ML
INJECTION, SOLUTION INTRAVASCULAR
Status: DISCONTINUED | OUTPATIENT
Start: 2022-10-27 | End: 2022-10-27 | Stop reason: HOSPADM

## 2022-10-27 RX ORDER — LIDOCAINE HYDROCHLORIDE 10 MG/ML
INJECTION INFILTRATION; PERINEURAL
Status: DISCONTINUED | OUTPATIENT
Start: 2022-10-27 | End: 2022-10-27 | Stop reason: HOSPADM

## 2022-10-27 RX ORDER — SODIUM CHLORIDE 9 MG/ML
INJECTION, SOLUTION INTRAVENOUS CONTINUOUS
Status: ACTIVE | OUTPATIENT
Start: 2022-10-27

## 2022-10-27 RX ORDER — HEPARIN SOD,PORCINE/0.9 % NACL 1000/500ML
INTRAVENOUS SOLUTION INTRAVENOUS
Status: DISCONTINUED | OUTPATIENT
Start: 2022-10-27 | End: 2022-10-27 | Stop reason: HOSPADM

## 2022-10-27 RX ADMIN — SODIUM CHLORIDE: 0.9 INJECTION, SOLUTION INTRAVENOUS at 08:10

## 2022-10-27 RX ADMIN — DIPHENHYDRAMINE HYDROCHLORIDE 50 MG: 50 CAPSULE ORAL at 08:10

## 2022-10-27 NOTE — Clinical Note
An angiography of the  bilateral lower extremity and power injected with 73 mL contrast at at 12 mL/s.

## 2022-10-27 NOTE — BRIEF OP NOTE
Brief Operative Note:    : Aime George MD     Referring Physician: Gabino Fry     All Operators: Surgeon(s):  Aime George MD     Preoperative Diagnosis: PVD (peripheral vascular disease) with claudication [I73.9]     Postop Diagnosis: PVD (peripheral vascular disease) with claudication [I73.9]    Treatments/Procedures: Procedure(s) (LRB):  ANGIOGRAM, ABDOMINAL AORTA W/ EXTREMITY RUNOFF (N/A)  ANGIOGRAM, EXTREMITY, UNILATERAL    Access: Left CFA    Findings:Severe peripheral artery disease is present.     See catheterization report for full details.    Intervention: None performed    See catheterization report for full details.    Closure device: Manual pressure       Plan:  - Post cath protocol   - IVF @ 150 cc/kg/hr x 4 hours  - Bed rest x 4 hours   - Continue aspirin 81 mg daily indefinitely  - Start rivaroxaban 2.5mg twice daily   - Continue high intensity statin therapy (LDL goal < 70)  - Risk factor reduction (BP <130/80 mmHg, glycemic control, etc)  - Follow up with outpatient cardiologist    Estimated Blood loss: 20 cc    Specimens removed: Isis Stephen Obi, MD  [facility]  Cardiovascular Disease, PGY-IV

## 2022-10-27 NOTE — PROGRESS NOTES
Ambulated around the floor without complication and minimal assistance. Able to void. Discharge instructions reviewed with patient and spouse. Verbalized understanding and given copy of paperwork.

## 2022-10-27 NOTE — Clinical Note
165 ml of contrast were injected throughout the case. 35 mL of contrast was the total wasted during the case. 200 mL was the total amount used during the case.

## 2022-10-27 NOTE — Clinical Note
An angiography was performed of the infrarenal abdominal aorta  via power injection. Injection was performed with 10 mL contrast at 10 mL/s. The power injection PSI was 1000.

## 2022-10-27 NOTE — PROGRESS NOTES
Completed ivf. Saline locked piv. Dressing to left groin remains clean, dry, and intact. Sat patient up in bed at 30 degrees.

## 2022-10-27 NOTE — H&P
INTERVENTIONAL CARDIOLOGY H&P        HPI:  Mariann Huff is a 62 y.o. female with CAD s/p GINGER to ostial LAD in 2014, HTN, HLD, DM2, MURTAZA (not on CPAP) here for evaluation of her PAD.      Seen by Dr. Hi on 8/10/22. Pt has PAD with Erasmo stage 1-2 claudication symptoms. Symptoms have persisted despite maximal medical therapy with cilostazol 100 mg bid, ASA 81 mg daily, and atorvastatin 40 mg daily.  She has no rest pain or tissue loss to suggest CLI. She also has lumbar degenerative disc disease, which is contributing to her claudication symptoms.  MRI Lumbar Spine on 8/3/2022 revealed acute T12 compression fracture with mild depression of the superior endplate.  No significant retropulsion. Postoperative change of L5-S1 interbody and posterior instrumented fusion.       Pain in her right/ left calf gets worse with exertion. Right leg pain is greater than her left.She denies loss of sensation or any other issues at this time.      8/3/22 CTA   Right lower extremity:  The right common femoral artery is patent with moderate narrowing.  The right superficial femoral artery demonstrates multifocal  moderate to severe narrowing.  The right deep femoral artery demonstrates no hemodynamically significant stenosis.  The right popliteal artery demonstrates  moderate narrowing.  The anterior tibial and peroneal arteries are patent proximally.  The distal peroneal artery not well opacified and likely with severe atherosclerotic disease.  The posterior tibial artery is patent at the origin however there is likely severe atherosclerotic disease throughout its course.  The dominant supply to the right foot appears to be the anterior tibial artery.      Left lower extremity:  Left common femoral artery is patent.  Left superficial femoral artery demonstrates moderate to severe multifocal narrowing.  Left deep femoral artery demonstrates mild-to-moderate narrowing..  Left popliteal artery demonstrates multifocal moderate  to severe narrowing.  Patent origins of the left calf vessels.  There is moderate - severe atherosclerosis of the left calf vessels however they appear patent to the feet      Interval Hx: She presents for BLE angio/intervention.     ROS:  Constitution: Negative for fever, chills, weight loss or gain.   HENT: Negative for sore throat, rhinorrhea, or headache.  Eyes: Negative for blurred or double vision.   Cardiovascular: No Exertional chest pain  Pulmonary: NoD yspnea on exertion  Gastrointestinal: Negative for abdominal pain, nausea, vomiting, or diarrhea. Negative for melena/hematochezia.  : Negative for dysuria.   Neurological: Negative for focal weakness or sensory changes.  Skin: No bleeding or bruising    Past Medical History:   Diagnosis Date    Anticoagulant long-term use     Asthma     Back pain     Bradycardia, unspecified 5/8/2019    The etiology of the bradycardic episode is unclear.  The have appear to be respiratory in origin (at least the 1st episode).  We will continue to monitor carefully.  We are awaiting evaluation by Cardiology.      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, with long-term current use of insulin 1/9/2020     Past Surgical History:   Procedure Laterality Date    ANTEGRADE NEPHROSTOGRAPHY Left 12/11/2019    Procedure: Nephrostogram - antegrade;  Surgeon: Robin Boyd MD;  Location: [facility] OR [address] [place] [address];  Service: Urology;   Laterality: Left;    BRONCHOSCOPY N/A 2019    Procedure: BRONCHOSCOPY;  Surgeon: Sean Ruano MD;  [facility];  Service: General;  Laterality: N/A;    CARDIAC CATHETERIZATION      CATARACT EXTRACTION      cataract extraction left eye      cataracts      CAUDAL EPIDURAL STEROID INJECTION N/A 2019    Procedure: Injection-steroid-epidural-caudal;  Surgeon: Dave Bentley Jr., MD;  Location: [facility] PAIN MGT;  Service: Pain Management;  Laterality: N/A;     SECTION, LOW TRANSVERSE      COLONOSCOPY N/A 2019    Procedure: COLONOSCOPY;  Surgeon: Al Alaniz MD;  Location: [facility] ENDO (2ND FLR);  Service: Endoscopy;  Laterality: N/A;    CORONARY ANGIOPLASTY      CYSTOGRAM N/A 2019    Procedure: CYSTOGRAM INTRAOP;  Surgeon: Robin Boyd MD;  [facility];  Service: Urology;  Laterality: N/A;  1 HOUR    CYSTOSCOPY W/ RETROGRADES Left 2019    Procedure: CYSTOSCOPY, WITH RETROGRADE PYELOGRAM;  Surgeon: Robin Boyd MD;  [facility];  Service: Urology;  Laterality: Left;  fluro    ESOPHAGOGASTRODUODENOSCOPY W/ PEG  2019    Procedure: EGD, WITH PEG TUBE INSERTION;  Surgeon: Sean Ruano MD;  [facility];  Service: General;;    EXCISION TURBINATE, SUBMUCOUS      FLEXIBLE SIGMOIDOSCOPY N/A 2019    Procedure: SIGMOIDOSCOPY, FLEXIBLE;  Surgeon: ALBERTO Amin MD;  [facility]  Service: Endoscopy;  Laterality: N/A;    FLEXIBLE SIGMOIDOSCOPY N/A 2019    Procedure: SIGMOIDOSCOPY, FLEXIBLE;  Surgeon: ALBERTO Amin MD;  [facility]  Service: Endoscopy;  Laterality: N/A;    FUSION OF LUMBAR SPINE BY ANTERIOR APPROACH Left 2019    Procedure: FUSION, SPINE, LUMBAR, ANTERIOR APPROACH Left L5-S1 Anterior to Psoa Interbody Fusion, L5-S1 Posterior Instrumentation;  Surgeon: Mk George MD;  [facility];  Service: Neurosurgery;  Laterality: Left;  Porcedure:   Left L5-S1 Anterior to Psoa Interbody Fusion, L5-S1 Posterior Instrumentation  Surgery Time: 4 Hrs  LOS: 2-3  Anesthesia: General   Blood: Type & Screen  R    HAND SURGERY Left     HAND SURGERY Right     torn ligament repair/ Dr. Yeboah    HYSTERECTOMY      INJECTION OF STEROID Right 12/10/2020    Procedure: INJECTION, STEROID Right SI Joint Block and Steroid Injection;  Surgeon: Mk George MD;  Location: [facility];  Service: Neurosurgery;  Laterality: Right;  Procedure: Right SI Joint Block and Steroid Injection   SUrgery Time: 30 Min  LOS: 0  Anesthesia: MAC  Radiology: C-arm  Bed: Tomer 4 Poster  Position: Prone    INJECTION OF STEROID Right 9/28/2021    Procedure: INJECTION, STEROID Right SI joint block & steroid injection;  Surgeon: Mk George MD;  Location: [facility];  Service: Neurosurgery;  Laterality: Right;  Procedure: Right SI joint block & steroid injection  Surgery Time: 30m  Anesthesia: Local MAC  Radiology: C-arm  Bed: Regular  Position: Prone    left foot surgery      left wrist surgery      LYSIS OF ADHESIONS N/A 2/19/2020    Procedure: LYSIS, ADHESIONS;  Surgeon: Robin Boyd MD;  Location: [address];  Service: Urology;  Laterality: N/A;    NASAL SEPTUM SURGERY  5/7/15    PERCUTANEOUS NEPHROSTOMY Left 4/21/2019    Procedure: Creation, Nephrostomy, Percutaneous;  Surgeon: Karina Surgeon;  [facility];  Service: Anesthesiology;  Laterality: Left;    REPAIR OF URETER  4/12/2019    Procedure: REPAIR, URETER;  Surgeon: Mk George MD;  Location: [address];  Service: Neurosurgery;;    REPLACEMENT OF NEPHROSTOMY TUBE N/A 7/18/2019    Procedure: REPLACEMENT, NEPHROSTOMY TUBE;  Surgeon: [facility] Diagnostic Provider;  [address]  Service: Anesthesiology;  Laterality: N/A;  188    REPLACEMENT OF NEPHROSTOMY TUBE N/A 7/24/2019    Procedure: REPLACEMENT, NEPHROSTOMY TUBE;  Surgeon: [facility] Diagnostic Provider;  [facility];  Service: Anesthesiology;   Laterality: N/A;  188    REPLACEMENT OF NEPHROSTOMY TUBE N/A 10/7/2019    Procedure: REPLACEMENT, NEPHROSTOMY TUBE;  Surgeon: [facility] Diagnostic Provider;  [facility];  Service: Anesthesiology;  Laterality: N/A;  189    REPLACEMENT OF NEPHROSTOMY TUBE N/A 11/25/2019    Procedure: REPLACEMENT, NEPHROSTOMY TUBE;  Surgeon: [facility] Diagnostic Provider;  [facility];  Service: Anesthesiology;  Laterality: N/A;  Room 188/Bessy    REPLACEMENT OF NEPHROSTOMY TUBE Right 2/19/2020    Procedure: REPLACEMENT, NEPHROSTOMY TUBE removal removal;  Surgeon: Robin Boyd MD;  Location: [facility] OR [address] [place] [address];  Service: Urology;  Laterality: Right;    rt elbow surgery      S/P LAD COATED STENT  05/14/2010    6 total     S/P OM1 STENT  08/2003    SINUS SURGERY      F.E.S.S.    TRACHEOSTOMY N/A 5/2/2019    Procedure: CREATION, TRACHEOSTOMY;  Surgeon: Sean Ruano MD;  Location: [facility] OR [address] [place] [address];  Service: General;  Laterality: N/A;    TUBAL LIGATION      URETERAL REIMPLANTION Left 2/19/2020    Procedure: REIMPLANTATION, URETER WITH PSOAS HITCH;  Surgeon: Robin Boyd MD;  Location: [facility] OR [address] [place] [address];  Service: Urology;  Laterality: Left;     Family History   Problem Relation Age of Onset    Diabetes Mother     Heart disease Mother     Diabetes Father     Leukemia Father         leukemia    Heart attack Father     Diabetes Sister     Diabetes Brother     Diabetes Sister     No Known Problems Sister     No Known Problems Brother     No Known Problems Brother     No Known Problems Maternal Grandmother     No Known Problems Maternal Grandfather     No Known Problems Paternal Grandmother     No Known Problems Paternal Grandfather     No Known Problems Son     No Known Problems Son     No Known Problems Maternal Aunt     No Known Problems Maternal Uncle     No Known Problems Paternal Aunt     No Known Problems Paternal Uncle     Colon cancer Neg Hx     Inflammatory bowel disease Neg Hx     Melanoma Neg Hx     Psoriasis  Neg Hx     Lupus Neg Hx     Eczema Neg Hx     Acne Neg Hx     Amblyopia Neg Hx     Blindness Neg Hx     Cancer Neg Hx     Cataracts Neg Hx     Glaucoma Neg Hx     Hypertension Neg Hx     Macular degeneration Neg Hx     Retinal detachment Neg Hx     Strabismus Neg Hx     Stroke Neg Hx     Thyroid disease Neg Hx     Heart failure Neg Hx     Hyperlipidemia Neg Hx      Social History     Tobacco Use    Smoking status: Never    Smokeless tobacco: Never   Substance Use Topics    Alcohol use: No     Alcohol/week: 0.0 standard drinks    Drug use: No     Review of patient's allergies indicates:   Allergen Reactions    Milk containing products      Causes GI distress       No current facility-administered medications on file prior to encounter.     Current Outpatient Medications on File Prior to Encounter   Medication Sig Dispense Refill    amLODIPine (NORVASC) 10 MG tablet Take 1 tablet (10 mg total) by mouth once daily. 90 tablet 2    aspirin 81 mg Tab Take 81 mg by mouth. 1 Tablet Oral Every day      atorvastatin (LIPITOR) 40 MG tablet Take 1 tablet (40 mg total) by mouth every evening. 90 tablet 3    cilostazoL (PLETAL) 100 MG Tab Take 1 tablet (100 mg total) by mouth 2 (two) times daily. 60 tablet 11    dapagliflozin (FARXIGA) 10 mg tablet Take 1 tablet (10 mg total) by mouth once daily. 90 tablet 1    hydroCHLOROthiazide (HYDRODIURIL) 12.5 MG Tab Take 1 tablet (12.5 mg total) by mouth once daily. 90 tablet 3    isosorbide mononitrate (ISMO,MONOKET) 10 mg tablet Take 1 tablet (10 mg total) by mouth once daily. 90 tablet 2    metoprolol tartrate (LOPRESSOR) 50 MG tablet TAKE 1 TABLET BY MOUTH TWICE A  tablet 1    multivitamin (THERAGRAN) per tablet Take 1 tablet by mouth once daily.      potassium chloride SA (K-DUR,KLOR-CON) 20 MEQ tablet Take 1 tablet (20 mEq total) by mouth 2 (two) times daily. 60 tablet 11    ACCU-CHEK EDIN PLUS METER Misc       albuterol (PROVENTIL/VENTOLIN HFA) 90 mcg/actuation inhaler  "Inhale 2 puffs into the lungs every 6 (six) hours as needed for Wheezing. 1 g 3    albuterol-ipratropium (DUO-NEB) 2.5 mg-0.5 mg/3 mL nebulizer solution Inhale 3 mLs into the lungs.      blood sugar diagnostic (ACCU-CHEK EDIN PLUS TEST STRP) Strp TEST BLOOD SUGARS 4 TIMES DAILY 150 strip 11    diclofenac sodium (VOLTAREN) 1 % Gel Apply 2 g topically 3 (three) times daily. Apply to the area of pain 2-3x per day or night as needed 100 g 3    evolocumab (REPATHA SURECLICK) 140 mg/mL PnIj Inject 1 mL (140 mg total) into the skin every 14 (fourteen) days. 2 mL 6    pen needle, diabetic (NOVOTWIST) 32 gauge x 1/5" Ndle Use 1 needle to inject insulin four times daily 400 each 2    promethazine (PHENERGAN) 12.5 MG Tab Take 1 tablet (12.5 mg total) by mouth every 8 (eight) hours as needed (nausea). 30 tablet 1       Continuous Infusions:   sodium chloride 0.9% 125 mL/hr at 10/27/22 0842       Scheduled Meds:  PRN Meds:  OBJECTIVE:     Vitals:    10/27/22 0815 10/27/22 0818   BP: 135/66 131/66   Pulse: 67    Resp: 18    Temp: 98.4 °F (36.9 °C)    TempSrc: Temporal    SpO2: 95%    Weight: 72.1 kg (159 lb)    Height: 5' 4" (1.626 m)        Gen: Lying in bed, no acute distress  HEENT: Supple, no JVD  CVS: Regular rate and rhythm, no murmurs  Resp: Normal work of breathing, clear bilaterally  Abd: Soft, non-tender and not distended  Ext: Warm, no edema. Bilateral femoral pulses present, R>L        IMPRESSION:   1.Claudication of both lower extremities  2. PAD (peripheral artery disease)  Given continued symptoms despite maximal medical therapy for PAD, will plan for BLE angio/intervention todfay.  R>L pain   Continue aspirin and high-intensity statin.    Continue walking program goal of at least 30 minutes per day, 5 days per week.        PLAN:        - B/L LE angiogram planned. Possible intervention of RLE given pain worse in that leg.   - Anti-platelet Therapy: ASA   - Access: Left SFA  - Creatinine/CrCl: 1.2  - Allergies: No " shellfish / Iodine allergy  - Pre-Hydration: NS  - Pre-Op Med: Bendaryl 50mg pO   - All patient's questions were answered.  -The risks, benefits and alternatives of the procedure were explained to the patient.   - Additionally, pt is aware that non-compliance is likely to result in stent clotting with heart attack, heart failure, and/or death  -The risks of moderate sedation include hypotension, respiratory depression, arrhythmias, bronchospasm, and death.   - Informed consent was obtained and the  patient is agreeable to proceed with the procedure.       Zafar Lee MD  Cardiology Fellow PGY IV  Pager: [phone]

## 2022-10-27 NOTE — Clinical Note
An angiography was performed of the infrapopliteal artery, anterior tibial artery, tibio-peroneal trunk, peroneal artery and posterior tibial artery

## 2022-10-28 NOTE — DISCHARGE SUMMARY
Interventional Cardiology Discharge Summary  [facility]    Admit Date: 10/27/2022  Discharge Date: 10/27/2022    Admit Provider: Aime George MD  Discharge Provider: Aime George MD    HPI:   Mariann Huff is a 62 y.o. female with CAD s/p GINGER to ostial LAD in 2014, HTN, HLD, DM2, MURTAZA (not on CPAP) here for evaluation of her PAD.      Seen by Dr. Hi on 8/10/22. Pt has PAD with Erasmo stage 1-2 claudication symptoms. Symptoms have persisted despite maximal medical therapy with cilostazol 100 mg bid, ASA 81 mg daily, and atorvastatin 40 mg daily.  She has no rest pain or tissue loss to suggest CLI. She also has lumbar degenerative disc disease, which is contributing to her claudication symptoms.  MRI Lumbar Spine on 8/3/2022 revealed acute T12 compression fracture with mild depression of the superior endplate.  No significant retropulsion. Postoperative change of L5-S1 interbody and posterior instrumented fusion.       Pain in her right/ left calf gets worse with exertion. Right leg pain is greater than her left.She denies loss of sensation or any other issues at this time.      8/3/22 CTA   Right lower extremity:  The right common femoral artery is patent with moderate narrowing.  The right superficial femoral artery demonstrates multifocal  moderate to severe narrowing.  The right deep femoral artery demonstrates no hemodynamically significant stenosis.  The right popliteal artery demonstrates  moderate narrowing.  The anterior tibial and peroneal arteries are patent proximally.  The distal peroneal artery not well opacified and likely with severe atherosclerotic disease.  The posterior tibial artery is patent at the origin however there is likely severe atherosclerotic disease throughout its course.  The dominant supply to the right foot appears to be the anterior tibial artery.      Left lower extremity:  Left common femoral artery is patent.  Left  superficial femoral artery demonstrates moderate to severe multifocal narrowing.  Left deep femoral artery demonstrates mild-to-moderate narrowing..  Left popliteal artery demonstrates multifocal moderate to severe narrowing.  Patent origins of the left calf vessels.  There is moderate - severe atherosclerosis of the left calf vessels however they appear patent to the feet        Interval Hx: She presents for BLE angio/intervention.       Hospital Course:   Patient presented for outpatient peripheral angiogram which went without complication. Peripheral angiogram revealed severe peripheral artery disease . See full cath report in Epic for details. Hemostasis of patient's R CFA access site was achieved with manual pressure. Patient was monitored per post-cath protocol, and her R groin access site was c/d/i with no hematoma. Patient was able to ambulate without difficulty. She was feeling well and anticipating discharge home today.     Outpatient Plan:  - Medication changes/ additions include: Start low dose apixaban 2.5mg BID .  - Continue aspirin, statin and cilostazol.  - Continue with graded exercise.       Medication List        START taking these medications      ELIQUIS 2.5 mg Tab  Generic drug: apixaban  Take 1 tablet (2.5 mg total) by mouth 2 (two) times daily.            CONTINUE taking these medications      ACCU-CHEK EDIN PLUS METER Misc  Generic drug: blood-glucose meter     albuterol 90 mcg/actuation inhaler  Commonly known as: PROVENTIL/VENTOLIN HFA  Inhale 2 puffs into the lungs every 6 (six) hours as needed for Wheezing.     albuterol-ipratropium 2.5 mg-0.5 mg/3 mL nebulizer solution  Commonly known as: DUO-NEB     amLODIPine 10 MG tablet  Commonly known as: NORVASC  Take 1 tablet (10 mg total) by mouth once daily.     aspirin 81 mg Tab     atorvastatin 40 MG tablet  Commonly known as: LIPITOR  Take 1 tablet (40 mg total) by mouth every evening.     blood sugar diagnostic Strp  Commonly known as:  "ACCU-CHEK EDIN PLUS TEST STRP  TEST BLOOD SUGARS 4 TIMES DAILY     cilostazoL 100 MG Tab  Commonly known as: PLETAL  Take 1 tablet (100 mg total) by mouth 2 (two) times daily.     diclofenac sodium 1 % Gel  Commonly known as: VOLTAREN  Apply 2 g topically 3 (three) times daily. Apply to the area of pain 2-3x per day or night as needed     EScitalopram oxalate 10 MG tablet  Commonly known as: LEXAPRO  Take 1 tablet (10 mg total) by mouth once daily.     eszopiclone 2 MG Tab  Commonly known as: LUNESTA  Take 1 tablet (2 mg total) by mouth every evening.     FARXIGA 10 mg tablet  Generic drug: dapagliflozin  Take 1 tablet (10 mg total) by mouth once daily.     hydroCHLOROthiazide 12.5 MG Tab  Commonly known as: HYDRODIURIL  Take 1 tablet (12.5 mg total) by mouth once daily.     isosorbide mononitrate 10 mg tablet  Commonly known as: ISMO,MONOKET  Take 1 tablet (10 mg total) by mouth once daily.     metoprolol tartrate 50 MG tablet  Commonly known as: LOPRESSOR  TAKE 1 TABLET BY MOUTH TWICE A DAY     multivitamin per tablet  Commonly known as: THERAGRAN  Take 1 tablet by mouth once daily.     pen needle, diabetic 32 gauge x 1/5" Ndle  Commonly known as: NOVOTWIST  Use 1 needle to inject insulin four times daily     potassium chloride SA 20 MEQ tablet  Commonly known as: K-DUR,KLOR-CON  Take 1 tablet (20 mEq total) by mouth 2 (two) times daily.     promethazine 12.5 MG Tab  Commonly known as: PHENERGAN  Take 1 tablet (12.5 mg total) by mouth every 8 (eight) hours as needed (nausea).     REPATHA SURECLICK 140 mg/mL Pnij  Generic drug: evolocumab  Inject 1 mL (140 mg total) into the skin every 14 (fourteen) days.     telmisartan 80 MG Tab  Commonly known as: MICARDIS  Take 1 tablet (80 mg total) by mouth once daily. Stop losartan               Where to Get Your Medications        These medications were sent to [facility]  [address] [address]      Hours: Mon-Fri 7a-7p, Sat-Sun 10a-4p " Phone: [phone]   ELIQUIS 2.5 mg Tab           Zafar Lee MD  [facility]  Cardiovascular Disease, PGY-IV

## 2022-11-08 ENCOUNTER — OFFICE VISIT (OUTPATIENT)
Dept: INTERNAL MEDICINE | Facility: CLINIC | Age: 62
End: 2022-11-08
Payer: COMMERCIAL

## 2022-11-08 VITALS
TEMPERATURE: 97 F | OXYGEN SATURATION: 100 % | HEIGHT: 64 IN | DIASTOLIC BLOOD PRESSURE: 76 MMHG | BODY MASS INDEX: 27.1 KG/M2 | HEART RATE: 76 BPM | RESPIRATION RATE: 14 BRPM | SYSTOLIC BLOOD PRESSURE: 132 MMHG | WEIGHT: 158.75 LBS

## 2022-11-08 DIAGNOSIS — E11.69 HYPERLIPIDEMIA ASSOCIATED WITH TYPE 2 DIABETES MELLITUS: Chronic | ICD-10-CM

## 2022-11-08 DIAGNOSIS — Z79.4 TYPE 2 DIABETES MELLITUS WITH RETINOPATHY, WITH LONG-TERM CURRENT USE OF INSULIN, MACULAR EDEMA PRESENCE UNSPECIFIED, UNSPECIFIED LATERALITY, UNSPECIFIED RETINOPATHY SEVERITY: ICD-10-CM

## 2022-11-08 DIAGNOSIS — I65.23 CAROTID STENOSIS, BILATERAL: Chronic | ICD-10-CM

## 2022-11-08 DIAGNOSIS — E78.5 HYPERLIPIDEMIA ASSOCIATED WITH TYPE 2 DIABETES MELLITUS: Chronic | ICD-10-CM

## 2022-11-08 DIAGNOSIS — E11.59 HYPERTENSION ASSOCIATED WITH DIABETES: Chronic | ICD-10-CM

## 2022-11-08 DIAGNOSIS — E11.51 TYPE 2 DIABETES MELLITUS WITH DIABETIC PERIPHERAL ANGIOPATHY WITHOUT GANGRENE, WITHOUT LONG-TERM CURRENT USE OF INSULIN: Chronic | ICD-10-CM

## 2022-11-08 DIAGNOSIS — E11.319 TYPE 2 DIABETES MELLITUS WITH RETINOPATHY, WITH LONG-TERM CURRENT USE OF INSULIN, MACULAR EDEMA PRESENCE UNSPECIFIED, UNSPECIFIED LATERALITY, UNSPECIFIED RETINOPATHY SEVERITY: ICD-10-CM

## 2022-11-08 DIAGNOSIS — I25.10 CORONARY ARTERY DISEASE INVOLVING NATIVE CORONARY ARTERY OF NATIVE HEART WITHOUT ANGINA PECTORIS: Chronic | ICD-10-CM

## 2022-11-08 DIAGNOSIS — Z00.00 ANNUAL PHYSICAL EXAM: Primary | ICD-10-CM

## 2022-11-08 DIAGNOSIS — I12.9 TYPE 2 DIABETES MELLITUS WITH STAGE 2 CHRONIC KIDNEY DISEASE AND HYPERTENSION: Chronic | ICD-10-CM

## 2022-11-08 DIAGNOSIS — Z12.11 SCREEN FOR COLON CANCER: ICD-10-CM

## 2022-11-08 DIAGNOSIS — E11.22 TYPE 2 DIABETES MELLITUS WITH STAGE 2 CHRONIC KIDNEY DISEASE AND HYPERTENSION: Chronic | ICD-10-CM

## 2022-11-08 DIAGNOSIS — I50.30 HEART FAILURE WITH PRESERVED EJECTION FRACTION, UNSPECIFIED HF CHRONICITY: Chronic | ICD-10-CM

## 2022-11-08 DIAGNOSIS — N18.2 TYPE 2 DIABETES MELLITUS WITH STAGE 2 CHRONIC KIDNEY DISEASE AND HYPERTENSION: Chronic | ICD-10-CM

## 2022-11-08 DIAGNOSIS — Z12.31 VISIT FOR SCREENING MAMMOGRAM: ICD-10-CM

## 2022-11-08 DIAGNOSIS — I15.2 HYPERTENSION ASSOCIATED WITH DIABETES: Chronic | ICD-10-CM

## 2022-11-08 PROCEDURE — 90471 IMMUNIZATION ADMIN: CPT | Mod: S$GLB,,, | Performed by: INTERNAL MEDICINE

## 2022-11-08 PROCEDURE — 4010F PR ACE/ARB THEARPY RXD/TAKEN: ICD-10-PCS | Mod: CPTII,S$GLB,, | Performed by: INTERNAL MEDICINE

## 2022-11-08 PROCEDURE — 3075F PR MOST RECENT SYSTOLIC BLOOD PRESS GE 130-139MM HG: ICD-10-PCS | Mod: CPTII,S$GLB,, | Performed by: INTERNAL MEDICINE

## 2022-11-08 PROCEDURE — 90686 IIV4 VACC NO PRSV 0.5 ML IM: CPT | Mod: S$GLB,,, | Performed by: INTERNAL MEDICINE

## 2022-11-08 PROCEDURE — 1160F PR REVIEW ALL MEDS BY PRESCRIBER/CLIN PHARMACIST DOCUMENTED: ICD-10-PCS | Mod: CPTII,S$GLB,, | Performed by: INTERNAL MEDICINE

## 2022-11-08 PROCEDURE — 99999 PR PBB SHADOW E&M-EST. PATIENT-LVL V: CPT | Mod: PBBFAC,,, | Performed by: INTERNAL MEDICINE

## 2022-11-08 PROCEDURE — 1160F RVW MEDS BY RX/DR IN RCRD: CPT | Mod: CPTII,S$GLB,, | Performed by: INTERNAL MEDICINE

## 2022-11-08 PROCEDURE — 3052F HG A1C>EQUAL 8.0%<EQUAL 9.0%: CPT | Mod: CPTII,S$GLB,, | Performed by: INTERNAL MEDICINE

## 2022-11-08 PROCEDURE — 3052F PR MOST RECENT HEMOGLOBIN A1C LEVEL 8.0 - < 9.0%: ICD-10-PCS | Mod: CPTII,S$GLB,, | Performed by: INTERNAL MEDICINE

## 2022-11-08 PROCEDURE — 3072F PR LOW RISK FOR RETINOPATHY: ICD-10-PCS | Mod: CPTII,S$GLB,, | Performed by: INTERNAL MEDICINE

## 2022-11-08 PROCEDURE — 3078F PR MOST RECENT DIASTOLIC BLOOD PRESSURE < 80 MM HG: ICD-10-PCS | Mod: CPTII,S$GLB,, | Performed by: INTERNAL MEDICINE

## 2022-11-08 PROCEDURE — 1159F MED LIST DOCD IN RCRD: CPT | Mod: CPTII,S$GLB,, | Performed by: INTERNAL MEDICINE

## 2022-11-08 PROCEDURE — 90686 FLU VACCINE (QUAD) GREATER THAN OR EQUAL TO 3YO PRESERVATIVE FREE IM: ICD-10-PCS | Mod: S$GLB,,, | Performed by: INTERNAL MEDICINE

## 2022-11-08 PROCEDURE — 3072F LOW RISK FOR RETINOPATHY: CPT | Mod: CPTII,S$GLB,, | Performed by: INTERNAL MEDICINE

## 2022-11-08 PROCEDURE — 90471 FLU VACCINE (QUAD) GREATER THAN OR EQUAL TO 3YO PRESERVATIVE FREE IM: ICD-10-PCS | Mod: S$GLB,,, | Performed by: INTERNAL MEDICINE

## 2022-11-08 PROCEDURE — 99999 PR PBB SHADOW E&M-EST. PATIENT-LVL V: ICD-10-PCS | Mod: PBBFAC,,, | Performed by: INTERNAL MEDICINE

## 2022-11-08 PROCEDURE — 3078F DIAST BP <80 MM HG: CPT | Mod: CPTII,S$GLB,, | Performed by: INTERNAL MEDICINE

## 2022-11-08 PROCEDURE — 3075F SYST BP GE 130 - 139MM HG: CPT | Mod: CPTII,S$GLB,, | Performed by: INTERNAL MEDICINE

## 2022-11-08 PROCEDURE — 4010F ACE/ARB THERAPY RXD/TAKEN: CPT | Mod: CPTII,S$GLB,, | Performed by: INTERNAL MEDICINE

## 2022-11-08 PROCEDURE — 1159F PR MEDICATION LIST DOCUMENTED IN MEDICAL RECORD: ICD-10-PCS | Mod: CPTII,S$GLB,, | Performed by: INTERNAL MEDICINE

## 2022-11-08 PROCEDURE — 99396 PREV VISIT EST AGE 40-64: CPT | Mod: 25,S$GLB,, | Performed by: INTERNAL MEDICINE

## 2022-11-08 PROCEDURE — 99396 PR PREVENTIVE VISIT,EST,40-64: ICD-10-PCS | Mod: 25,S$GLB,, | Performed by: INTERNAL MEDICINE

## 2022-11-08 PROCEDURE — 3008F BODY MASS INDEX DOCD: CPT | Mod: CPTII,S$GLB,, | Performed by: INTERNAL MEDICINE

## 2022-11-08 PROCEDURE — 3008F PR BODY MASS INDEX (BMI) DOCUMENTED: ICD-10-PCS | Mod: CPTII,S$GLB,, | Performed by: INTERNAL MEDICINE

## 2022-11-08 RX ORDER — ZOSTER VACCINE RECOMBINANT, ADJUVANTED 50 MCG/0.5
KIT INTRAMUSCULAR ONCE
Qty: 1 EACH | Refills: 1 | Status: SHIPPED | OUTPATIENT
Start: 2022-11-08 | End: 2022-11-08

## 2022-11-08 RX ORDER — DULAGLUTIDE 1.5 MG/.5ML
1.5 INJECTION, SOLUTION SUBCUTANEOUS
Qty: 4 PEN | Refills: 3
Start: 2022-11-08 | End: 2022-12-06

## 2022-11-09 ENCOUNTER — IMMUNIZATION (OUTPATIENT)
Dept: INTERNAL MEDICINE | Facility: CLINIC | Age: 62
End: 2022-11-09
Payer: COMMERCIAL

## 2022-11-09 DIAGNOSIS — Z23 NEED FOR VACCINATION: Primary | ICD-10-CM

## 2022-11-09 PROCEDURE — 91312 COVID-19, MRNA, LNP-S, BIVALENT BOOSTER, PF, 30 MCG/0.3 ML DOSE: ICD-10-PCS | Mod: S$GLB,,, | Performed by: INTERNAL MEDICINE

## 2022-11-09 PROCEDURE — 91312 COVID-19, MRNA, LNP-S, BIVALENT BOOSTER, PF, 30 MCG/0.3 ML DOSE: CPT | Mod: S$GLB,,, | Performed by: INTERNAL MEDICINE

## 2022-11-09 PROCEDURE — 0124A COVID-19, MRNA, LNP-S, BIVALENT BOOSTER, PF, 30 MCG/0.3 ML DOSE: CPT | Mod: CV19,PBBFAC | Performed by: INTERNAL MEDICINE

## 2022-11-14 ENCOUNTER — SPECIALTY PHARMACY (OUTPATIENT)
Dept: PHARMACY | Facility: CLINIC | Age: 62
End: 2022-11-14
Payer: COMMERCIAL

## 2022-11-14 ENCOUNTER — OFFICE VISIT (OUTPATIENT)
Dept: CARDIOLOGY | Facility: CLINIC | Age: 62
End: 2022-11-14
Payer: COMMERCIAL

## 2022-11-14 VITALS
HEART RATE: 75 BPM | DIASTOLIC BLOOD PRESSURE: 68 MMHG | SYSTOLIC BLOOD PRESSURE: 128 MMHG | BODY MASS INDEX: 26.67 KG/M2 | HEIGHT: 64 IN | WEIGHT: 156.19 LBS

## 2022-11-14 DIAGNOSIS — R53.81 DEBILITY: ICD-10-CM

## 2022-11-14 DIAGNOSIS — Z79.4 TYPE 2 DIABETES MELLITUS WITH DIABETIC PERIPHERAL ANGIOPATHY WITHOUT GANGRENE, WITH LONG-TERM CURRENT USE OF INSULIN: Chronic | ICD-10-CM

## 2022-11-14 DIAGNOSIS — I65.23 CAROTID STENOSIS, BILATERAL: Chronic | ICD-10-CM

## 2022-11-14 DIAGNOSIS — E11.69 HYPERLIPIDEMIA ASSOCIATED WITH TYPE 2 DIABETES MELLITUS: ICD-10-CM

## 2022-11-14 DIAGNOSIS — R00.1 BRADYCARDIA: ICD-10-CM

## 2022-11-14 DIAGNOSIS — I15.2 HYPERTENSION ASSOCIATED WITH DIABETES: Primary | ICD-10-CM

## 2022-11-14 DIAGNOSIS — I25.10 CORONARY ARTERY DISEASE INVOLVING NATIVE CORONARY ARTERY OF NATIVE HEART WITHOUT ANGINA PECTORIS: Chronic | ICD-10-CM

## 2022-11-14 DIAGNOSIS — E11.59 HYPERTENSION ASSOCIATED WITH DIABETES: Primary | ICD-10-CM

## 2022-11-14 DIAGNOSIS — E11.51 TYPE 2 DIABETES MELLITUS WITH DIABETIC PERIPHERAL ANGIOPATHY WITHOUT GANGRENE, WITH LONG-TERM CURRENT USE OF INSULIN: Chronic | ICD-10-CM

## 2022-11-14 DIAGNOSIS — R26.89 POOR BALANCE: ICD-10-CM

## 2022-11-14 DIAGNOSIS — E11.22 TYPE 2 DIABETES MELLITUS WITH STAGE 2 CHRONIC KIDNEY DISEASE AND HYPERTENSION: Chronic | ICD-10-CM

## 2022-11-14 DIAGNOSIS — Z98.890 S/P URETERAL REIMPLANTATION: ICD-10-CM

## 2022-11-14 DIAGNOSIS — E78.5 HYPERLIPIDEMIA ASSOCIATED WITH TYPE 2 DIABETES MELLITUS: ICD-10-CM

## 2022-11-14 DIAGNOSIS — I50.33 ACUTE ON CHRONIC HEART FAILURE WITH PRESERVED EJECTION FRACTION: Chronic | ICD-10-CM

## 2022-11-14 DIAGNOSIS — H35.033 HYPERTENSIVE RETINOPATHY, BILATERAL: ICD-10-CM

## 2022-11-14 DIAGNOSIS — I73.9 PAD (PERIPHERAL ARTERY DISEASE): ICD-10-CM

## 2022-11-14 DIAGNOSIS — N18.2 TYPE 2 DIABETES MELLITUS WITH STAGE 2 CHRONIC KIDNEY DISEASE AND HYPERTENSION: Chronic | ICD-10-CM

## 2022-11-14 DIAGNOSIS — G47.30 SLEEP APNEA, UNSPECIFIED TYPE: Chronic | ICD-10-CM

## 2022-11-14 DIAGNOSIS — I25.2 HISTORY OF NON-ST ELEVATION MYOCARDIAL INFARCTION (NSTEMI): Chronic | ICD-10-CM

## 2022-11-14 DIAGNOSIS — N18.31 CHRONIC KIDNEY DISEASE, STAGE 3A: ICD-10-CM

## 2022-11-14 DIAGNOSIS — G89.29 OTHER CHRONIC PAIN: ICD-10-CM

## 2022-11-14 DIAGNOSIS — I12.9 TYPE 2 DIABETES MELLITUS WITH STAGE 2 CHRONIC KIDNEY DISEASE AND HYPERTENSION: Chronic | ICD-10-CM

## 2022-11-14 DIAGNOSIS — E11.311 DIABETIC MACULAR EDEMA: ICD-10-CM

## 2022-11-14 PROCEDURE — 99214 PR OFFICE/OUTPT VISIT, EST, LEVL IV, 30-39 MIN: ICD-10-PCS | Mod: S$GLB,,, | Performed by: INTERNAL MEDICINE

## 2022-11-14 PROCEDURE — 3078F PR MOST RECENT DIASTOLIC BLOOD PRESSURE < 80 MM HG: ICD-10-PCS | Mod: CPTII,S$GLB,, | Performed by: INTERNAL MEDICINE

## 2022-11-14 PROCEDURE — 4010F PR ACE/ARB THEARPY RXD/TAKEN: ICD-10-PCS | Mod: CPTII,S$GLB,, | Performed by: INTERNAL MEDICINE

## 2022-11-14 PROCEDURE — 99214 OFFICE O/P EST MOD 30 MIN: CPT | Mod: S$GLB,,, | Performed by: INTERNAL MEDICINE

## 2022-11-14 PROCEDURE — 3052F HG A1C>EQUAL 8.0%<EQUAL 9.0%: CPT | Mod: CPTII,S$GLB,, | Performed by: INTERNAL MEDICINE

## 2022-11-14 PROCEDURE — 3074F SYST BP LT 130 MM HG: CPT | Mod: CPTII,S$GLB,, | Performed by: INTERNAL MEDICINE

## 2022-11-14 PROCEDURE — 4010F ACE/ARB THERAPY RXD/TAKEN: CPT | Mod: CPTII,S$GLB,, | Performed by: INTERNAL MEDICINE

## 2022-11-14 PROCEDURE — 3072F LOW RISK FOR RETINOPATHY: CPT | Mod: CPTII,S$GLB,, | Performed by: INTERNAL MEDICINE

## 2022-11-14 PROCEDURE — 99999 PR PBB SHADOW E&M-EST. PATIENT-LVL V: CPT | Mod: PBBFAC,,, | Performed by: INTERNAL MEDICINE

## 2022-11-14 PROCEDURE — 1159F MED LIST DOCD IN RCRD: CPT | Mod: CPTII,S$GLB,, | Performed by: INTERNAL MEDICINE

## 2022-11-14 PROCEDURE — 3074F PR MOST RECENT SYSTOLIC BLOOD PRESSURE < 130 MM HG: ICD-10-PCS | Mod: CPTII,S$GLB,, | Performed by: INTERNAL MEDICINE

## 2022-11-14 PROCEDURE — 1159F PR MEDICATION LIST DOCUMENTED IN MEDICAL RECORD: ICD-10-PCS | Mod: CPTII,S$GLB,, | Performed by: INTERNAL MEDICINE

## 2022-11-14 PROCEDURE — 1160F PR REVIEW ALL MEDS BY PRESCRIBER/CLIN PHARMACIST DOCUMENTED: ICD-10-PCS | Mod: CPTII,S$GLB,, | Performed by: INTERNAL MEDICINE

## 2022-11-14 PROCEDURE — 3078F DIAST BP <80 MM HG: CPT | Mod: CPTII,S$GLB,, | Performed by: INTERNAL MEDICINE

## 2022-11-14 PROCEDURE — 3052F PR MOST RECENT HEMOGLOBIN A1C LEVEL 8.0 - < 9.0%: ICD-10-PCS | Mod: CPTII,S$GLB,, | Performed by: INTERNAL MEDICINE

## 2022-11-14 PROCEDURE — 99999 PR PBB SHADOW E&M-EST. PATIENT-LVL V: ICD-10-PCS | Mod: PBBFAC,,, | Performed by: INTERNAL MEDICINE

## 2022-11-14 PROCEDURE — 3072F PR LOW RISK FOR RETINOPATHY: ICD-10-PCS | Mod: CPTII,S$GLB,, | Performed by: INTERNAL MEDICINE

## 2022-11-14 PROCEDURE — 3008F BODY MASS INDEX DOCD: CPT | Mod: CPTII,S$GLB,, | Performed by: INTERNAL MEDICINE

## 2022-11-14 PROCEDURE — 1160F RVW MEDS BY RX/DR IN RCRD: CPT | Mod: CPTII,S$GLB,, | Performed by: INTERNAL MEDICINE

## 2022-11-14 PROCEDURE — 3008F PR BODY MASS INDEX (BMI) DOCUMENTED: ICD-10-PCS | Mod: CPTII,S$GLB,, | Performed by: INTERNAL MEDICINE

## 2022-11-14 NOTE — TELEPHONE ENCOUNTER
Specialty Pharmacy - Refill Coordination    Specialty Medication Orders Linked to Encounter      Flowsheet Row Most Recent Value   Medication #1 evolocumab (REPATHA SURECLICK) 140 mg/mL PnIj (Order#[id number], Rx#[id number])            Refill Questions - Documented Responses      Flowsheet Row Most Recent Value   Patient Availability and HIPAA Verification    Does patient want to proceed with activity? Yes   HIPAA/medical authority confirmed? Yes   Relationship to patient of person spoken to? Self   Refill Screening Questions    Would patient like to speak to a pharmacist? No   When does the patient need to receive the medication? 11/21/22   Refill Delivery Questions    How will the patient receive the medication? MEDRx   When does the patient need to receive the medication? 11/21/22   Shipping Address Prescription   Address in [facility] confirmed and updated if neccessary? Yes   Expected Copay ($) 0   Is the patient able to afford the medication copay? Yes   Payment Method zero copay   Days supply of Refill 28   Refill activity completed? Yes   Refill activity plan Refill scheduled   Shipment/Pickup Date: 11/17/22            Current Outpatient Medications   Medication Sig    ACCU-CHEK EDIN PLUS METER Misc     albuterol (PROVENTIL/VENTOLIN HFA) 90 mcg/actuation inhaler Inhale 2 puffs into the lungs every 6 (six) hours as needed for Wheezing.    albuterol-ipratropium (DUO-NEB) 2.5 mg-0.5 mg/3 mL nebulizer solution Inhale 3 mLs into the lungs.    amLODIPine (NORVASC) 10 MG tablet Take 1 tablet (10 mg total) by mouth once daily.    apixaban (ELIQUIS) 2.5 mg Tab Take 1 tablet (2.5 mg total) by mouth 2 (two) times daily.    aspirin 81 mg Tab Take 81 mg by mouth. 1 Tablet Oral Every day    atorvastatin (LIPITOR) 40 MG tablet Take 1 tablet (40 mg total) by mouth every evening.    blood sugar diagnostic (ACCU-CHEK EDIN PLUS TEST STRP) Strp TEST BLOOD SUGARS 4 TIMES DAILY    cilostazoL (PLETAL) 100 MG Tab Take 1  "tablet (100 mg total) by mouth 2 (two) times daily.    dapagliflozin (FARXIGA) 10 mg tablet Take 1 tablet (10 mg total) by mouth once daily.    diclofenac sodium (VOLTAREN) 1 % Gel Apply 2 g topically 3 (three) times daily. Apply to the area of pain 2-3x per day or night as needed    dulaglutide (TRULICITY) 1.5 mg/0.5 mL pen injector Inject 1.5 mg into the skin every 7 days.    EScitalopram oxalate (LEXAPRO) 10 MG tablet Take 1 tablet (10 mg total) by mouth once daily.    eszopiclone (LUNESTA) 2 MG Tab Take 1 tablet (2 mg total) by mouth every evening.    evolocumab (REPATHA SURECLICK) 140 mg/mL PnIj Inject 1 mL (140 mg total) into the skin every 14 (fourteen) days. (Patient taking differently: Inject 140 mg into the skin every 14 (fourteen) days. Once a month)    hydroCHLOROthiazide (HYDRODIURIL) 12.5 MG Tab Take 1 tablet (12.5 mg total) by mouth once daily.    isosorbide mononitrate (ISMO,MONOKET) 10 mg tablet Take 1 tablet (10 mg total) by mouth once daily.    metoprolol tartrate (LOPRESSOR) 50 MG tablet TAKE 1 TABLET BY MOUTH TWICE A DAY    multivitamin (THERAGRAN) per tablet Take 1 tablet by mouth once daily.    pen needle, diabetic (NOVOTWIST) 32 gauge x 1/5" Ndle Use 1 needle to inject insulin four times daily    potassium chloride SA (K-DUR,KLOR-CON) 20 MEQ tablet Take 1 tablet (20 mEq total) by mouth 2 (two) times daily.    promethazine (PHENERGAN) 12.5 MG Tab Take 1 tablet (12.5 mg total) by mouth every 8 (eight) hours as needed (nausea).    telmisartan (MICARDIS) 80 MG Tab Take 1 tablet (80 mg total) by mouth once daily. Stop losartan   Last reviewed on 11/14/2022  1:17 PM by Adelaide Aguilar MD    Review of patient's allergies indicates:   Allergen Reactions    Milk containing products      Causes GI distress    Last reviewed on  11/14/2022 1:17 PM by Adelaide Aguilar      Tasks added this encounter   12/12/2022 - Refill Call (Auto Added)   Tasks due within next 3 months   No tasks due.     [facility]  [address]  [facility]  [facility] [address]  Phone: [phone]  Fax: [phone]

## 2022-11-14 NOTE — PROGRESS NOTES
Subjective:   Patient ID:  Mariann Huff is a 62 y.o. female who presents for follow-up of Coronary artery disease involving native coronary artery of      HPI: Recent history: taking medications as instructed, no medication side effects noted, no TIA's, no chest pain on exertion, no dyspnea on exertion and no swelling of ankles. Patient's symptoms have been unchanged. No medication side effects.  She reports poor dietary compliance. Diabetes remains poorly controlled.      Lab Results   Component Value Date     10/25/2022    K 4.2 10/25/2022     10/25/2022    CO2 27 10/25/2022    BUN 21 (H) 10/25/2022    CREATININE 0.99 10/25/2022     (H) 10/25/2022    HGBA1C 8.8 (H) 10/25/2022    MG 2.0 08/20/2020    AST 23 10/25/2022    AST 23 10/25/2022    ALT 18 10/25/2022    ALT 18 10/25/2022    ALBUMIN 4.4 10/25/2022    ALBUMIN 4.4 10/25/2022    PROT 7.8 10/25/2022    PROT 7.8 10/25/2022    BILITOT 0.9 10/25/2022    BILITOT 0.9 10/25/2022    WBC 6.59 10/17/2022    HGB 12.1 10/17/2022    HCT 39.2 10/17/2022    HCT 19 (LL) 05/19/2019    MCV 87 10/17/2022     10/17/2022    INR 0.9 10/17/2022    TSH 1.230 10/25/2022    CHOL 100 (L) 10/25/2022    HDL 40 10/25/2022    LDLCALC 31.8 (L) 10/25/2022    TRIG 141 10/25/2022       No results found in the last 24 hours.     Review of Systems   Constitutional: Negative.   HENT: Negative.     Eyes: Negative.    Cardiovascular: Negative.  Negative for chest pain, claudication, dyspnea on exertion, irregular heartbeat, leg swelling, near-syncope, palpitations and syncope.   Respiratory: Negative.  Negative for cough, shortness of breath, snoring and wheezing.    Endocrine: Negative.  Negative for cold intolerance, heat intolerance, polydipsia, polyphagia and polyuria.   Skin: Negative.    Musculoskeletal:  Positive for back pain.   Gastrointestinal: Negative.    Genitourinary: Negative.    Neurological: Negative.    Psychiatric/Behavioral: Negative.    "    Objective:   Physical Exam  Vitals and nursing note reviewed.   Constitutional:       Appearance: Normal appearance. She is well-developed. She is obese.      Comments: /68   Pulse 75   Ht 5' 4" (1.626 m)   Wt 70.8 kg (156 lb 3.1 oz)   LMP  (LMP Unknown)   BMI 26.81 kg/m²      HENT:      Head: Normocephalic.   Eyes:      Pupils: Pupils are equal, round, and reactive to light.   Neck:      Thyroid: No thyromegaly.      Vascular: No carotid bruit.   Cardiovascular:      Rate and Rhythm: Normal rate and regular rhythm.      Pulses: Intact distal pulses.           Carotid pulses are 2+ on the right side and 2+ on the left side.       Radial pulses are 2+ on the right side and 2+ on the left side.        Femoral pulses are 2+ on the right side and 2+ on the left side.       Popliteal pulses are 2+ on the right side and 2+ on the left side.        Dorsalis pedis pulses are 0 on the right side and 0 on the left side.        Posterior tibial pulses are 0 on the right side and 0 on the left side.      Heart sounds: Normal heart sounds. No murmur heard.    No friction rub. No gallop.   Pulmonary:      Effort: Pulmonary effort is normal. No respiratory distress.      Breath sounds: Normal breath sounds. No wheezing or rales.   Chest:      Chest wall: No tenderness.   Abdominal:      Palpations: Abdomen is soft.   Musculoskeletal:         General: Normal range of motion.      Cervical back: Normal range of motion and neck supple.   Skin:     General: Skin is warm and dry.   Neurological:      Mental Status: She is alert and oriented to person, place, and time.         Assessment and Plan:     Problem List Items Addressed This Visit          Cardiology Problems    Hypertension associated with diabetes - Primary (Chronic)    Relevant Orders    Lipid Panel    Hepatic Function Panel    Hyperlipidemia associated with type 2 diabetes mellitus (Chronic)    Relevant Orders    Lipid Panel    Hepatic Function Panel    CAD " (coronary artery disease) (Chronic)    Carotid stenosis, bilateral (Chronic)    Type 2 diabetes mellitus with diabetic peripheral angiopathy without gangrene (Chronic)    Type 2 diabetes mellitus with stage 2 chronic kidney disease and hypertension (Chronic)    (HFpEF) heart failure with preserved ejection fraction (Chronic)    PAD (peripheral artery disease)       Other    Sleep apnea (Chronic)    History of non-ST elevation myocardial infarction (NSTEMI) (Chronic)    DME (diabetic macular edema)    Chronic pain    Bradycardia    Debility    Poor balance    Hypertensive retinopathy, bilateral    S/P ureteral reimplantation    Chronic kidney disease, stage 3a       Patient's Medications   New Prescriptions    No medications on file   Previous Medications    ACCU-CHEK EDIN PLUS METER MISC        ALBUTEROL (PROVENTIL/VENTOLIN HFA) 90 MCG/ACTUATION INHALER    Inhale 2 puffs into the lungs every 6 (six) hours as needed for Wheezing.    ALBUTEROL-IPRATROPIUM (DUO-NEB) 2.5 MG-0.5 MG/3 ML NEBULIZER SOLUTION    Inhale 3 mLs into the lungs.    AMLODIPINE (NORVASC) 10 MG TABLET    Take 1 tablet (10 mg total) by mouth once daily.    APIXABAN (ELIQUIS) 2.5 MG TAB    Take 1 tablet (2.5 mg total) by mouth 2 (two) times daily.    ASPIRIN 81 MG TAB    Take 81 mg by mouth. 1 Tablet Oral Every day    ATORVASTATIN (LIPITOR) 40 MG TABLET    Take 1 tablet (40 mg total) by mouth every evening.    BLOOD SUGAR DIAGNOSTIC (ACCU-CHEK EDIN PLUS TEST STRP) STRP    TEST BLOOD SUGARS 4 TIMES DAILY    CILOSTAZOL (PLETAL) 100 MG TAB    Take 1 tablet (100 mg total) by mouth 2 (two) times daily.    DAPAGLIFLOZIN (FARXIGA) 10 MG TABLET    Take 1 tablet (10 mg total) by mouth once daily.    DICLOFENAC SODIUM (VOLTAREN) 1 % GEL    Apply 2 g topically 3 (three) times daily. Apply to the area of pain 2-3x per day or night as needed    DULAGLUTIDE (TRULICITY) 1.5 MG/0.5 ML PEN INJECTOR    Inject 1.5 mg into the skin every 7 days.    ESCITALOPRAM OXALATE  "(LEXAPRO) 10 MG TABLET    Take 1 tablet (10 mg total) by mouth once daily.    ESZOPICLONE (LUNESTA) 2 MG TAB    Take 1 tablet (2 mg total) by mouth every evening.    EVOLOCUMAB (REPATHA SURECLICK) 140 MG/ML PNIJ    Inject 1 mL (140 mg total) into the skin every 14 (fourteen) days.    HYDROCHLOROTHIAZIDE (HYDRODIURIL) 12.5 MG TAB    Take 1 tablet (12.5 mg total) by mouth once daily.    ISOSORBIDE MONONITRATE (ISMO,MONOKET) 10 MG TABLET    Take 1 tablet (10 mg total) by mouth once daily.    METOPROLOL TARTRATE (LOPRESSOR) 50 MG TABLET    TAKE 1 TABLET BY MOUTH TWICE A DAY    MULTIVITAMIN (THERAGRAN) PER TABLET    Take 1 tablet by mouth once daily.    PEN NEEDLE, DIABETIC (NOVOTWIST) 32 GAUGE X 1/5" NDLE    Use 1 needle to inject insulin four times daily    POTASSIUM CHLORIDE SA (K-DUR,KLOR-CON) 20 MEQ TABLET    Take 1 tablet (20 mEq total) by mouth 2 (two) times daily.    PROMETHAZINE (PHENERGAN) 12.5 MG TAB    Take 1 tablet (12.5 mg total) by mouth every 8 (eight) hours as needed (nausea).    TELMISARTAN (MICARDIS) 80 MG TAB    Take 1 tablet (80 mg total) by mouth once daily. Stop losartan   Modified Medications    No medications on file   Discontinued Medications    No medications on file     Decrease Repatha injections to once a month.  Repeat blood work in 3 months.  Secondary prevention, compliance, weight loss and exercise discussed at length  Follow up in about 6 months (around 5/14/2023).              "

## 2022-11-22 RX ORDER — ESZOPICLONE 2 MG/1
2 TABLET, FILM COATED ORAL NIGHTLY
Qty: 30 TABLET | Refills: 0 | Status: SHIPPED | OUTPATIENT
Start: 2022-11-22 | End: 2022-12-26 | Stop reason: SDUPTHER

## 2022-11-28 PROBLEM — A41.1: Status: RESOLVED | Noted: 2019-06-05 | Resolved: 2022-11-28

## 2022-12-06 ENCOUNTER — OFFICE VISIT (OUTPATIENT)
Dept: INTERNAL MEDICINE | Facility: CLINIC | Age: 62
End: 2022-12-06
Payer: COMMERCIAL

## 2022-12-06 ENCOUNTER — LAB VISIT (OUTPATIENT)
Dept: LAB | Facility: HOSPITAL | Age: 62
End: 2022-12-06
Attending: INTERNAL MEDICINE
Payer: COMMERCIAL

## 2022-12-06 ENCOUNTER — CLINICAL SUPPORT (OUTPATIENT)
Dept: DIABETES | Facility: CLINIC | Age: 62
End: 2022-12-06
Payer: COMMERCIAL

## 2022-12-06 VITALS
DIASTOLIC BLOOD PRESSURE: 64 MMHG | HEART RATE: 68 BPM | BODY MASS INDEX: 27.03 KG/M2 | WEIGHT: 158.31 LBS | SYSTOLIC BLOOD PRESSURE: 128 MMHG | HEIGHT: 64 IN | RESPIRATION RATE: 12 BRPM | TEMPERATURE: 97 F

## 2022-12-06 DIAGNOSIS — E11.319 TYPE 2 DIABETES MELLITUS WITH RETINOPATHY, WITH LONG-TERM CURRENT USE OF INSULIN, MACULAR EDEMA PRESENCE UNSPECIFIED, UNSPECIFIED LATERALITY, UNSPECIFIED RETINOPATHY SEVERITY: ICD-10-CM

## 2022-12-06 DIAGNOSIS — I73.9 PAD (PERIPHERAL ARTERY DISEASE): ICD-10-CM

## 2022-12-06 DIAGNOSIS — E11.22 TYPE 2 DIABETES MELLITUS WITH STAGE 2 CHRONIC KIDNEY DISEASE AND HYPERTENSION: Chronic | ICD-10-CM

## 2022-12-06 DIAGNOSIS — I12.9 TYPE 2 DIABETES MELLITUS WITH STAGE 2 CHRONIC KIDNEY DISEASE AND HYPERTENSION: Chronic | ICD-10-CM

## 2022-12-06 DIAGNOSIS — I15.2 HYPERTENSION ASSOCIATED WITH DIABETES: Chronic | ICD-10-CM

## 2022-12-06 DIAGNOSIS — Z79.4 TYPE 2 DIABETES MELLITUS WITH DIABETIC PERIPHERAL ANGIOPATHY WITHOUT GANGRENE, WITH LONG-TERM CURRENT USE OF INSULIN: Chronic | ICD-10-CM

## 2022-12-06 DIAGNOSIS — E11.51 TYPE 2 DIABETES MELLITUS WITH DIABETIC PERIPHERAL ANGIOPATHY WITHOUT GANGRENE, WITH LONG-TERM CURRENT USE OF INSULIN: Primary | Chronic | ICD-10-CM

## 2022-12-06 DIAGNOSIS — G47.00 INSOMNIA, UNSPECIFIED TYPE: ICD-10-CM

## 2022-12-06 DIAGNOSIS — E11.59 HYPERTENSION ASSOCIATED WITH DIABETES: Chronic | ICD-10-CM

## 2022-12-06 DIAGNOSIS — N18.2 TYPE 2 DIABETES MELLITUS WITH STAGE 2 CHRONIC KIDNEY DISEASE AND HYPERTENSION: Chronic | ICD-10-CM

## 2022-12-06 DIAGNOSIS — E11.51 TYPE 2 DIABETES MELLITUS WITH DIABETIC PERIPHERAL ANGIOPATHY WITHOUT GANGRENE, WITH LONG-TERM CURRENT USE OF INSULIN: Chronic | ICD-10-CM

## 2022-12-06 DIAGNOSIS — Z79.4 TYPE 2 DIABETES MELLITUS WITH RETINOPATHY, WITH LONG-TERM CURRENT USE OF INSULIN, MACULAR EDEMA PRESENCE UNSPECIFIED, UNSPECIFIED LATERALITY, UNSPECIFIED RETINOPATHY SEVERITY: ICD-10-CM

## 2022-12-06 DIAGNOSIS — E78.5 HYPERLIPIDEMIA ASSOCIATED WITH TYPE 2 DIABETES MELLITUS: Chronic | ICD-10-CM

## 2022-12-06 DIAGNOSIS — Z79.4 TYPE 2 DIABETES MELLITUS WITH DIABETIC PERIPHERAL ANGIOPATHY WITHOUT GANGRENE, WITH LONG-TERM CURRENT USE OF INSULIN: Primary | Chronic | ICD-10-CM

## 2022-12-06 DIAGNOSIS — I50.33 ACUTE ON CHRONIC HEART FAILURE WITH PRESERVED EJECTION FRACTION: Chronic | ICD-10-CM

## 2022-12-06 DIAGNOSIS — E11.69 HYPERLIPIDEMIA ASSOCIATED WITH TYPE 2 DIABETES MELLITUS: Chronic | ICD-10-CM

## 2022-12-06 LAB
ALBUMIN SERPL BCP-MCNC: 4.1 G/DL (ref 3.5–5.2)
ALP SERPL-CCNC: 137 U/L (ref 55–135)
ALT SERPL W/O P-5'-P-CCNC: 16 U/L (ref 10–44)
ANION GAP SERPL CALC-SCNC: 9 MMOL/L (ref 8–16)
AST SERPL-CCNC: 16 U/L (ref 10–40)
BILIRUB SERPL-MCNC: 1 MG/DL (ref 0.1–1)
BUN SERPL-MCNC: 21 MG/DL (ref 8–23)
CALCIUM SERPL-MCNC: 10 MG/DL (ref 8.7–10.5)
CHLORIDE SERPL-SCNC: 107 MMOL/L (ref 95–110)
CO2 SERPL-SCNC: 26 MMOL/L (ref 23–29)
CREAT SERPL-MCNC: 1 MG/DL (ref 0.5–1.4)
EST. GFR  (NO RACE VARIABLE): >60 ML/MIN/1.73 M^2
ESTIMATED AVG GLUCOSE: 214 MG/DL (ref 68–131)
GLUCOSE SERPL-MCNC: 201 MG/DL (ref 70–110)
HBA1C MFR BLD: 9.1 % (ref 4–5.6)
POTASSIUM SERPL-SCNC: 4.3 MMOL/L (ref 3.5–5.1)
PROT SERPL-MCNC: 8 G/DL (ref 6–8.4)
SODIUM SERPL-SCNC: 142 MMOL/L (ref 136–145)

## 2022-12-06 PROCEDURE — 36415 COLL VENOUS BLD VENIPUNCTURE: CPT | Mod: PO | Performed by: INTERNAL MEDICINE

## 2022-12-06 PROCEDURE — 4010F ACE/ARB THERAPY RXD/TAKEN: CPT | Mod: CPTII,S$GLB,, | Performed by: INTERNAL MEDICINE

## 2022-12-06 PROCEDURE — 3052F HG A1C>EQUAL 8.0%<EQUAL 9.0%: CPT | Mod: CPTII,S$GLB,, | Performed by: INTERNAL MEDICINE

## 2022-12-06 PROCEDURE — G0108 PR DIAB MANAGE TRN  PER INDIV: ICD-10-PCS | Mod: S$GLB,,, | Performed by: DIETITIAN, REGISTERED

## 2022-12-06 PROCEDURE — 83036 HEMOGLOBIN GLYCOSYLATED A1C: CPT | Performed by: INTERNAL MEDICINE

## 2022-12-06 PROCEDURE — 99214 PR OFFICE/OUTPT VISIT, EST, LEVL IV, 30-39 MIN: ICD-10-PCS | Mod: S$GLB,,, | Performed by: INTERNAL MEDICINE

## 2022-12-06 PROCEDURE — 3072F LOW RISK FOR RETINOPATHY: CPT | Mod: CPTII,S$GLB,, | Performed by: INTERNAL MEDICINE

## 2022-12-06 PROCEDURE — 3078F PR MOST RECENT DIASTOLIC BLOOD PRESSURE < 80 MM HG: ICD-10-PCS | Mod: CPTII,S$GLB,, | Performed by: INTERNAL MEDICINE

## 2022-12-06 PROCEDURE — 3052F PR MOST RECENT HEMOGLOBIN A1C LEVEL 8.0 - < 9.0%: ICD-10-PCS | Mod: CPTII,S$GLB,, | Performed by: INTERNAL MEDICINE

## 2022-12-06 PROCEDURE — 1159F PR MEDICATION LIST DOCUMENTED IN MEDICAL RECORD: ICD-10-PCS | Mod: CPTII,S$GLB,, | Performed by: INTERNAL MEDICINE

## 2022-12-06 PROCEDURE — 1159F MED LIST DOCD IN RCRD: CPT | Mod: CPTII,S$GLB,, | Performed by: INTERNAL MEDICINE

## 2022-12-06 PROCEDURE — 3008F BODY MASS INDEX DOCD: CPT | Mod: CPTII,S$GLB,, | Performed by: INTERNAL MEDICINE

## 2022-12-06 PROCEDURE — 99999 PR PBB SHADOW E&M-EST. PATIENT-LVL I: CPT | Mod: PBBFAC,,, | Performed by: DIETITIAN, REGISTERED

## 2022-12-06 PROCEDURE — 3008F PR BODY MASS INDEX (BMI) DOCUMENTED: ICD-10-PCS | Mod: CPTII,S$GLB,, | Performed by: INTERNAL MEDICINE

## 2022-12-06 PROCEDURE — 99999 PR PBB SHADOW E&M-EST. PATIENT-LVL I: ICD-10-PCS | Mod: PBBFAC,,, | Performed by: DIETITIAN, REGISTERED

## 2022-12-06 PROCEDURE — 80053 COMPREHEN METABOLIC PANEL: CPT | Performed by: INTERNAL MEDICINE

## 2022-12-06 PROCEDURE — 3074F SYST BP LT 130 MM HG: CPT | Mod: CPTII,S$GLB,, | Performed by: INTERNAL MEDICINE

## 2022-12-06 PROCEDURE — 99999 PR PBB SHADOW E&M-EST. PATIENT-LVL III: ICD-10-PCS | Mod: PBBFAC,,, | Performed by: INTERNAL MEDICINE

## 2022-12-06 PROCEDURE — 1160F RVW MEDS BY RX/DR IN RCRD: CPT | Mod: CPTII,S$GLB,, | Performed by: INTERNAL MEDICINE

## 2022-12-06 PROCEDURE — G0108 DIAB MANAGE TRN  PER INDIV: HCPCS | Mod: S$GLB,,, | Performed by: DIETITIAN, REGISTERED

## 2022-12-06 PROCEDURE — 99214 OFFICE O/P EST MOD 30 MIN: CPT | Mod: S$GLB,,, | Performed by: INTERNAL MEDICINE

## 2022-12-06 PROCEDURE — 1160F PR REVIEW ALL MEDS BY PRESCRIBER/CLIN PHARMACIST DOCUMENTED: ICD-10-PCS | Mod: CPTII,S$GLB,, | Performed by: INTERNAL MEDICINE

## 2022-12-06 PROCEDURE — 3078F DIAST BP <80 MM HG: CPT | Mod: CPTII,S$GLB,, | Performed by: INTERNAL MEDICINE

## 2022-12-06 PROCEDURE — 99999 PR PBB SHADOW E&M-EST. PATIENT-LVL III: CPT | Mod: PBBFAC,,, | Performed by: INTERNAL MEDICINE

## 2022-12-06 PROCEDURE — 4010F PR ACE/ARB THEARPY RXD/TAKEN: ICD-10-PCS | Mod: CPTII,S$GLB,, | Performed by: INTERNAL MEDICINE

## 2022-12-06 PROCEDURE — 3072F PR LOW RISK FOR RETINOPATHY: ICD-10-PCS | Mod: CPTII,S$GLB,, | Performed by: INTERNAL MEDICINE

## 2022-12-06 PROCEDURE — 3074F PR MOST RECENT SYSTOLIC BLOOD PRESSURE < 130 MM HG: ICD-10-PCS | Mod: CPTII,S$GLB,, | Performed by: INTERNAL MEDICINE

## 2022-12-06 RX ORDER — DULAGLUTIDE 3 MG/.5ML
3 INJECTION, SOLUTION SUBCUTANEOUS
Qty: 4 PEN | Refills: 11 | Status: SHIPPED | OUTPATIENT
Start: 2022-12-06 | End: 2023-01-06

## 2022-12-06 NOTE — PROGRESS NOTES
Subjective:       Patient ID: Mariann Huff is a 62 y.o. female.    Chief Complaint: Follow-up    HPI    62-year-old female here for one-month follow-up.    Diabetes-Trulicity 1.5 mg weekly. She has not been checking her BG.  She forgets.  The machine is on her dresser.  She completed the diabetes course this am.  She was advised to stop drinking regular coke.  Lab Results   Component Value Date    HGBA1C 8.8 (H) 10/25/2022    HGBA1C 9.0 (H) 10/17/2022    HGBA1C 8.2 (H) 05/17/2022     Lab Results   Component Value Date    GLUF 217 (H) 09/15/2014    LDLCALC 31.8 (L) 10/25/2022    CREATININE 0.99 10/25/2022     HTN -  Patient's co morbidities include: Diabetes. Patient is currently on amlodipine 10 mg, Lopressor 50 mg b.i.d., HCTZ 12.5 mg, Micardis 80 mg. She does check her BP at home, and it runs 120s-130s/60s-70s. Side effects of medications note: none. Denies headaches, blurred vision, chest pain, shortness of breath, nausea.    HLD - Patient is currently on Lipitor 40 mg.  Her last lipid panel was   Cholesterol   Date Value Ref Range Status   10/25/2022 100 (L) 120 - 199 mg/dL Final     Comment:     The National Cholesterol Education Program (NCEP) has set the  following guidelines (reference ranges) for Cholesterol:  Optimal.....................<200 mg/dL  Borderline High.............200-239 mg/dL  High........................> or = 240 mg/dL       Triglycerides   Date Value Ref Range Status   10/25/2022 141 30 - 150 mg/dL Final     Comment:     The National Cholesterol Education Program (NCEP) has set the  following guidelines (reference values) for triglycerides:  Normal......................<150 mg/dL  Borderline High.............150-199 mg/dL  High........................200-499 mg/dL       HDL   Date Value Ref Range Status   10/25/2022 40 40 - 75 mg/dL Final     Comment:     The National Cholesterol Education Program (NCEP) has set the  following guidelines (reference values) for HDL  Cholesterol:  Low...............<40 mg/dL  Optimal...........>60 mg/dL       LDL Cholesterol   Date Value Ref Range Status   10/25/2022 31.8 (L) 63.0 - 159.0 mg/dL Final     Comment:     The National Cholesterol Education Program (NCEP) has set the  following guidelines (reference values) for LDL Cholesterol:  Optimal.......................<130 mg/dL  Borderline High...............130-159 mg/dL  High..........................160-189 mg/dL  Very High.....................>190 mg/dL     .  Side effects of the medication: none.    Patient has insomnia and is on Lunesta 2 mg nightly as needed.    Review of Systems      Objective:      Physical Exam  Vitals reviewed.   Constitutional:       Appearance: She is well-developed.   HENT:      Head: Normocephalic and atraumatic.      Mouth/Throat:      Pharynx: No oropharyngeal exudate.   Eyes:      General: No scleral icterus.        Right eye: No discharge.         Left eye: No discharge.      Pupils: Pupils are equal, round, and reactive to light.   Neck:      Thyroid: No thyromegaly.      Trachea: No tracheal deviation.   Cardiovascular:      Rate and Rhythm: Normal rate and regular rhythm.      Heart sounds: Normal heart sounds. No murmur heard.    No friction rub. No gallop.   Pulmonary:      Effort: Pulmonary effort is normal. No respiratory distress.      Breath sounds: Normal breath sounds. No wheezing or rales.   Chest:      Chest wall: No tenderness.   Abdominal:      General: Bowel sounds are normal. There is no distension.      Palpations: Abdomen is soft. There is no mass.      Tenderness: There is no abdominal tenderness. There is no guarding or rebound.   Musculoskeletal:         General: No tenderness. Normal range of motion.      Cervical back: Normal range of motion and neck supple.   Skin:     General: Skin is warm and dry.      Coloration: Skin is not pale.      Findings: No erythema or rash.   Neurological:      Mental Status: She is alert and oriented to  person, place, and time.   Psychiatric:         Behavior: Behavior normal.       Assessment:       1. Type 2 diabetes mellitus with diabetic peripheral angiopathy without gangrene, with long-term current use of insulin  - Hemoglobin A1C; Future  - Comprehensive Metabolic Panel; Future    2. Type 2 diabetes mellitus with retinopathy, with long-term current use of insulin, macular edema presence unspecified, unspecified laterality, unspecified retinopathy severity    3. Type 2 diabetes mellitus with stage 2 chronic kidney disease and hypertension    4. Hypertension associated with diabetes    5. Hyperlipidemia associated with type 2 diabetes mellitus    6. PAD (peripheral artery disease)    7. Acute on chronic heart failure with preserved ejection fraction    8. Insomnia, unspecified type      Plan:       1/2/3. Increase Trulicity to 3 mg weekly.  4. Continue amlodipine 10 mg, Lopressor 50 mg b.i.d., Micardis 80 mg.  5.  Continue Lipitor 40 mg.    6. Continue aspirin 81 mg, Lipitor 40 mg.  7.  Continue to monitor.  8.  Continue Lunesta 2 mg nightly as needed.

## 2022-12-06 NOTE — PROGRESS NOTES
Diabetes Care Specialist Progress Note  Author: Zee Abdalla RD, CDE  Date: 12/6/2022    Program Intake  Reason for Diabetes Program Visit:: Post Program Follow-Up  Type of Follow-Up: Other  Current diabetes risk level:: moderate    Lab Results   Component Value Date    HGBA1C 8.8 (H) 10/25/2022     Patient returns for DE follow up. Last seen May 2021. A1c had improved to as low as 7.6%, but recently back up to 8.8%. Currently on Farxiga and Trulicity. She had been on MDI, but stopped when A1c reached 7.6%. She does not really watch her CHO intake - eats 1 meal plus snacks; drinks mostly water, unsweet tea and cokes - trying coke zero occasionally, but still consuming regular coke. She rides stationary bike about 15 min a day - reports some limitations due to back surgery.    Reviewed diabetes disease process and treatment options. Reviewed CHO counting, label reading and addt'l resources to assist w/ CHO counting; plate method reviewed; alternatives to sugary beverages discussed; reviewed goals and benefits for physical activity; reviewed MOA of medication and regimen; reviewed SMBG schedule and BG goals; reviewed s/s and treatment of hypo/hyperglycemia; reviewed standards of care.    Patient would like to get set up with Stephanie Mckeon again - lost to follow up. She is scheduled for 2/14/23.    Assessment Summary and Plan    Based on today's diabetes care assessment, the following areas of need were identified:      Social 5/5/2021   Access to Mass Media/Tech No   Cognitive/Behavioral Health No   Culture/Jehovah's witness No   Communication No   Health Literacy No        Clinical 5/5/2021   Medication Adherence Yes   Nutritional Status No        Diabetes Self-Management Skills 5/5/2021   Home Blood Glucose Monitoring Yes          Today's interventions were provided through individual discussion, instruction, and written materials were provided.      Patient verbalized understanding of instruction and written materials.  Pt  was able to return back demonstration of instructions today. Patient understood key points, needs reinforcement and further instruction.     Diabetes Self-Management Care Plan:    Today's Diabetes Self-Management Care Plan was developed with Mariann's input. Orantoinette has agreed to work toward the following goal(s) to improve his/her overall diabetes control.      Care Plan: Diabetes Management   Updates made since 11/6/2022 12:00 AM        Problem: Blood Glucose Self-Monitoring         Goal: Use Dexcom as instructed    Start Date: 5/5/2021   Expected End Date: 8/5/2021   Priority: High   Barriers: No Barriers Identified   Note:    Patient is no longer on the Dexcom; therefore goal was not met.         Follow Up Plan     Follow up in about 1 year (around 12/6/2023). 2/14/23 with Diabetes NP provider; sooner DE if needed.    Today's care plan and follow up schedule was discussed with patient.  Mariann verbalized understanding of the care plan, goals, and agrees to follow up plan.        The patient was encouraged to communicate with his/her health care provider/physician and care team regarding his/her condition(s) and treatment.  I provided the patient with my contact information today and encouraged to contact me via phone or [facility] Patient Portal as needed.     Length of Visit   Total Time: 60 Minutes

## 2022-12-06 NOTE — LETTER
December 6, 2022      Stephanie Mckeon NP  [address] [facility] [address]         Patient: Mariann Huff   MR Number: [id number]   YOB: 1960   Date of Visit: 12/6/2022       Dear Dr. Mckeon:    Thank you for referring Mariann for diabetes self-management education and support services. She has completed all components of our Diabetes Management Program. Below is a summary of her clinical outcomes and goal progress.    Patient Outcomes:    A1c Status:   Lab Results   Component Value Date    HGBA1C 8.8 (H) 10/25/2022    HGBA1C 9.0 (H) 10/17/2022       Care Plan: Diabetes Management   Updates made since 11/6/2022 12:00 AM        Problem: Blood Glucose Self-Monitoring         Goal: Use Dexcom as instructed    Start Date: 5/5/2021   Expected End Date: 8/5/2021   Priority: High   Barriers: No Barriers Identified   Note:    Patient is no longer on the Dexcom         Follow up:   Oramaliater to attend medical appointments as scheduled  Orlester to update you on her DM education progress as needed      If you have questions, please do not hesitate to call me. I look forward to providing additional education and support as needed. Patient will follow with Stephanie Mckeon on 2/14/23.    Sincerely,    Zee Abdalla RD, CDE  Diabetes Care and

## 2022-12-07 ENCOUNTER — PATIENT MESSAGE (OUTPATIENT)
Dept: OTHER | Facility: OTHER | Age: 62
End: 2022-12-07
Payer: COMMERCIAL

## 2022-12-12 ENCOUNTER — PATIENT MESSAGE (OUTPATIENT)
Dept: PHARMACY | Facility: CLINIC | Age: 62
End: 2022-12-12
Payer: COMMERCIAL

## 2022-12-15 ENCOUNTER — SPECIALTY PHARMACY (OUTPATIENT)
Dept: PHARMACY | Facility: CLINIC | Age: 62
End: 2022-12-15
Payer: COMMERCIAL

## 2022-12-15 NOTE — TELEPHONE ENCOUNTER
Outgoing call: Patient is due to inject on 12/19, I informed her that a refill request was sent to her doctor and once approved [facility] will follow up.

## 2022-12-16 RX ORDER — EVOLOCUMAB 140 MG/ML
140 INJECTION, SOLUTION SUBCUTANEOUS
Qty: 2 ML | Refills: 6 | Status: ON HOLD | OUTPATIENT
Start: 2022-12-16 | End: 2023-06-25

## 2022-12-21 NOTE — TELEPHONE ENCOUNTER
Specialty Pharmacy - Refill Coordination    Specialty Medication Orders Linked to Encounter      Flowsheet Row Most Recent Value   Medication #1 evolocumab (REPATHA SURECLICK) 140 mg/mL PnIj (Order#[id number], Rx#)            Refill Questions - Documented Responses      Flowsheet Row Most Recent Value   Patient Availability and HIPAA Verification    Does patient want to proceed with activity? Yes   HIPAA/medical authority confirmed? Yes   Relationship to patient of person spoken to? Self   Refill Screening Questions    Would patient like to speak to a pharmacist? Yes   When does the patient need to receive the medication? 12/19/22   Refill Delivery Questions    How will the patient receive the medication? MEDRx   When does the patient need to receive the medication? 12/19/22   Shipping Address Home   Address in [facility] confirmed and updated if neccessary? Yes   Expected Copay ($) 0   Is the patient able to afford the medication copay? Yes   Payment Method zero copay   Days supply of Refill 28   Supplies needed? No supplies needed   Refill activity completed? Yes   Refill activity plan Refill scheduled   Shipment/Pickup Date: 12/22/22            Current Outpatient Medications   Medication Sig    ACCU-CHEK EDIN PLUS METER Misc     albuterol (PROVENTIL/VENTOLIN HFA) 90 mcg/actuation inhaler Inhale 2 puffs into the lungs every 6 (six) hours as needed for Wheezing.    albuterol-ipratropium (DUO-NEB) 2.5 mg-0.5 mg/3 mL nebulizer solution Inhale 3 mLs into the lungs.    amLODIPine (NORVASC) 10 MG tablet Take 1 tablet (10 mg total) by mouth once daily.    apixaban (ELIQUIS) 2.5 mg Tab Take 1 tablet (2.5 mg total) by mouth 2 (two) times daily.    aspirin 81 mg Tab Take 81 mg by mouth. 1 Tablet Oral Every day    atorvastatin (LIPITOR) 40 MG tablet Take 1 tablet (40 mg total) by mouth every evening.    blood sugar diagnostic (ACCU-CHEK EDIN PLUS TEST STRP) Strp TEST BLOOD SUGARS 4 TIMES DAILY    cilostazoL (PLETAL)  "100 MG Tab Take 1 tablet (100 mg total) by mouth 2 (two) times daily.    dapagliflozin (FARXIGA) 10 mg tablet Take 1 tablet (10 mg total) by mouth once daily.    diclofenac sodium (VOLTAREN) 1 % Gel Apply 2 g topically 3 (three) times daily. Apply to the area of pain 2-3x per day or night as needed    dulaglutide (TRULICITY) 3 mg/0.5 mL pen injector Inject 3 mg into the skin every 7 days.    EScitalopram oxalate (LEXAPRO) 10 MG tablet Take 1 tablet (10 mg total) by mouth once daily.    eszopiclone (LUNESTA) 2 MG Tab Take 1 tablet (2 mg total) by mouth every evening.    evolocumab (REPATHA SURECLICK) 140 mg/mL PnIj Inject 1 mL (140 mg total) into the skin every 14 (fourteen) days.    hydroCHLOROthiazide (HYDRODIURIL) 12.5 MG Tab Take 1 tablet (12.5 mg total) by mouth once daily.    isosorbide mononitrate (ISMO,MONOKET) 10 mg tablet Take 1 tablet (10 mg total) by mouth once daily.    metoprolol tartrate (LOPRESSOR) 50 MG tablet TAKE 1 TABLET BY MOUTH TWICE A DAY    multivitamin (THERAGRAN) per tablet Take 1 tablet by mouth once daily.    pen needle, diabetic (NOVOTWIST) 32 gauge x 1/5" Ndle Use 1 needle to inject insulin four times daily    potassium chloride SA (K-DUR,KLOR-CON) 20 MEQ tablet Take 1 tablet (20 mEq total) by mouth 2 (two) times daily.    promethazine (PHENERGAN) 12.5 MG Tab Take 1 tablet (12.5 mg total) by mouth every 8 (eight) hours as needed (nausea).    telmisartan (MICARDIS) 80 MG Tab Take 1 tablet (80 mg total) by mouth once daily. Stop losartan   Last reviewed on 12/6/2022  9:28 AM by Jasbir Haney MD    Review of patient's allergies indicates:   Allergen Reactions    Milk containing products      Causes GI distress    Last reviewed on  12/6/2022 9:28 AM by Jasbir Haney      Tasks added this encounter   No tasks added.   Tasks due within next 3 months   12/12/2022 - Refill Call (Auto Added)     Tomasz Engel, PharmD  [facility]  [address]  [facility] " [address]  Phone: [phone]  Fax: [phone]

## 2022-12-26 RX ORDER — ESZOPICLONE 2 MG/1
2 TABLET, FILM COATED ORAL NIGHTLY
Qty: 30 TABLET | Refills: 0 | Status: SHIPPED | OUTPATIENT
Start: 2022-12-26 | End: 2023-01-25 | Stop reason: SDUPTHER

## 2022-12-27 RX ORDER — DAPAGLIFLOZIN 10 MG/1
10 TABLET, FILM COATED ORAL DAILY
Qty: 90 TABLET | Refills: 0 | Status: SHIPPED | OUTPATIENT
Start: 2022-12-27 | End: 2023-03-31

## 2023-01-06 ENCOUNTER — OFFICE VISIT (OUTPATIENT)
Dept: INTERNAL MEDICINE | Facility: CLINIC | Age: 63
End: 2023-01-06
Payer: COMMERCIAL

## 2023-01-06 VITALS
HEIGHT: 64 IN | DIASTOLIC BLOOD PRESSURE: 72 MMHG | RESPIRATION RATE: 20 BRPM | SYSTOLIC BLOOD PRESSURE: 126 MMHG | OXYGEN SATURATION: 96 % | BODY MASS INDEX: 26.46 KG/M2 | HEART RATE: 73 BPM | TEMPERATURE: 98 F | WEIGHT: 155 LBS

## 2023-01-06 DIAGNOSIS — E11.51 TYPE 2 DIABETES MELLITUS WITH DIABETIC PERIPHERAL ANGIOPATHY WITHOUT GANGRENE, WITH LONG-TERM CURRENT USE OF INSULIN: Primary | Chronic | ICD-10-CM

## 2023-01-06 DIAGNOSIS — R29.818 NEUROGENIC CLAUDICATION: ICD-10-CM

## 2023-01-06 DIAGNOSIS — E11.319 TYPE 2 DIABETES MELLITUS WITH RETINOPATHY, WITH LONG-TERM CURRENT USE OF INSULIN, MACULAR EDEMA PRESENCE UNSPECIFIED, UNSPECIFIED LATERALITY, UNSPECIFIED RETINOPATHY SEVERITY: Chronic | ICD-10-CM

## 2023-01-06 DIAGNOSIS — I50.32 CHRONIC HEART FAILURE WITH PRESERVED EJECTION FRACTION: ICD-10-CM

## 2023-01-06 DIAGNOSIS — I12.9 TYPE 2 DIABETES MELLITUS WITH STAGE 2 CHRONIC KIDNEY DISEASE AND HYPERTENSION: Chronic | ICD-10-CM

## 2023-01-06 DIAGNOSIS — Z79.4 TYPE 2 DIABETES MELLITUS WITH RETINOPATHY, WITH LONG-TERM CURRENT USE OF INSULIN, MACULAR EDEMA PRESENCE UNSPECIFIED, UNSPECIFIED LATERALITY, UNSPECIFIED RETINOPATHY SEVERITY: Chronic | ICD-10-CM

## 2023-01-06 DIAGNOSIS — I15.2 HYPERTENSION ASSOCIATED WITH DIABETES: Chronic | ICD-10-CM

## 2023-01-06 DIAGNOSIS — E78.5 HYPERLIPIDEMIA ASSOCIATED WITH TYPE 2 DIABETES MELLITUS: Chronic | ICD-10-CM

## 2023-01-06 DIAGNOSIS — E11.22 TYPE 2 DIABETES MELLITUS WITH STAGE 2 CHRONIC KIDNEY DISEASE AND HYPERTENSION: Chronic | ICD-10-CM

## 2023-01-06 DIAGNOSIS — E11.59 HYPERTENSION ASSOCIATED WITH DIABETES: Chronic | ICD-10-CM

## 2023-01-06 DIAGNOSIS — Z79.4 TYPE 2 DIABETES MELLITUS WITH DIABETIC PERIPHERAL ANGIOPATHY WITHOUT GANGRENE, WITH LONG-TERM CURRENT USE OF INSULIN: Primary | Chronic | ICD-10-CM

## 2023-01-06 DIAGNOSIS — E11.69 HYPERLIPIDEMIA ASSOCIATED WITH TYPE 2 DIABETES MELLITUS: Chronic | ICD-10-CM

## 2023-01-06 DIAGNOSIS — N18.31 CHRONIC KIDNEY DISEASE, STAGE 3A: ICD-10-CM

## 2023-01-06 DIAGNOSIS — N18.2 TYPE 2 DIABETES MELLITUS WITH STAGE 2 CHRONIC KIDNEY DISEASE AND HYPERTENSION: Chronic | ICD-10-CM

## 2023-01-06 PROCEDURE — 3078F DIAST BP <80 MM HG: CPT | Mod: CPTII,S$GLB,, | Performed by: INTERNAL MEDICINE

## 2023-01-06 PROCEDURE — 99214 OFFICE O/P EST MOD 30 MIN: CPT | Mod: S$GLB,,, | Performed by: INTERNAL MEDICINE

## 2023-01-06 PROCEDURE — 3074F SYST BP LT 130 MM HG: CPT | Mod: CPTII,S$GLB,, | Performed by: INTERNAL MEDICINE

## 2023-01-06 PROCEDURE — 1159F PR MEDICATION LIST DOCUMENTED IN MEDICAL RECORD: ICD-10-PCS | Mod: CPTII,S$GLB,, | Performed by: INTERNAL MEDICINE

## 2023-01-06 PROCEDURE — 99214 PR OFFICE/OUTPT VISIT, EST, LEVL IV, 30-39 MIN: ICD-10-PCS | Mod: S$GLB,,, | Performed by: INTERNAL MEDICINE

## 2023-01-06 PROCEDURE — 3008F BODY MASS INDEX DOCD: CPT | Mod: CPTII,S$GLB,, | Performed by: INTERNAL MEDICINE

## 2023-01-06 PROCEDURE — 3074F PR MOST RECENT SYSTOLIC BLOOD PRESSURE < 130 MM HG: ICD-10-PCS | Mod: CPTII,S$GLB,, | Performed by: INTERNAL MEDICINE

## 2023-01-06 PROCEDURE — 3078F PR MOST RECENT DIASTOLIC BLOOD PRESSURE < 80 MM HG: ICD-10-PCS | Mod: CPTII,S$GLB,, | Performed by: INTERNAL MEDICINE

## 2023-01-06 PROCEDURE — 99999 PR PBB SHADOW E&M-EST. PATIENT-LVL V: ICD-10-PCS | Mod: PBBFAC,,, | Performed by: INTERNAL MEDICINE

## 2023-01-06 PROCEDURE — 1160F PR REVIEW ALL MEDS BY PRESCRIBER/CLIN PHARMACIST DOCUMENTED: ICD-10-PCS | Mod: CPTII,S$GLB,, | Performed by: INTERNAL MEDICINE

## 2023-01-06 PROCEDURE — 3008F PR BODY MASS INDEX (BMI) DOCUMENTED: ICD-10-PCS | Mod: CPTII,S$GLB,, | Performed by: INTERNAL MEDICINE

## 2023-01-06 PROCEDURE — 1160F RVW MEDS BY RX/DR IN RCRD: CPT | Mod: CPTII,S$GLB,, | Performed by: INTERNAL MEDICINE

## 2023-01-06 PROCEDURE — 99999 PR PBB SHADOW E&M-EST. PATIENT-LVL V: CPT | Mod: PBBFAC,,, | Performed by: INTERNAL MEDICINE

## 2023-01-06 PROCEDURE — 1159F MED LIST DOCD IN RCRD: CPT | Mod: CPTII,S$GLB,, | Performed by: INTERNAL MEDICINE

## 2023-01-06 RX ORDER — DULAGLUTIDE 4.5 MG/.5ML
4.5 INJECTION, SOLUTION SUBCUTANEOUS
Qty: 12 PEN | Refills: 3 | Status: ON HOLD | OUTPATIENT
Start: 2023-01-06 | End: 2023-05-26 | Stop reason: HOSPADM

## 2023-01-06 RX ORDER — GABAPENTIN 300 MG/1
300 CAPSULE ORAL NIGHTLY
Qty: 30 CAPSULE | Refills: 0 | Status: SHIPPED | OUTPATIENT
Start: 2023-01-06 | End: 2023-04-05

## 2023-01-06 NOTE — PROGRESS NOTES
Subjective:       Patient ID: Mariann Huff is a 62 y.o. female.    Chief Complaint: Follow-up    HPI    62-year-old female with neurogenic claudication, CKD stage IIIA here for follow-up.  She has pain in her right leg down to her foot sometimes.  This bothers her all the time - day and night.  She has cramping in her leg.  She had back surgery on her lower back.  This pain did not improve with PT.  She has been trying to keep up with the therapy post surgery.  She has seen Dr. George.      Diabetes-Trulicity 3 mg.  She has been checking her BG.  They run between 124 - 202.  Lab Results   Component Value Date    HGBA1C 9.1 (H) 12/06/2022    HGBA1C 8.8 (H) 10/25/2022    HGBA1C 9.0 (H) 10/17/2022     Lab Results   Component Value Date    GLUF 217 (H) 09/15/2014    LDLCALC 31.8 (L) 10/25/2022    CREATININE 1.0 12/06/2022     HTN -  Patient's co morbidities include:  Diabetes. Patient is currently on amlodipine 10 mg, Micardis 80 mg, Lopressor 50 mg b.i.d.. She does check her BP at home, and it runs 123//84. Side effects of medications note: none. Denies headaches, blurred vision, chest pain, shortness of breath, nausea.    HLD - Patient is currently on Lipitor 40 mg, Repatha.  Her last lipid panel was   Cholesterol   Date Value Ref Range Status   10/25/2022 100 (L) 120 - 199 mg/dL Final     Comment:     The National Cholesterol Education Program (NCEP) has set the  following guidelines (reference ranges) for Cholesterol:  Optimal.....................<200 mg/dL  Borderline High.............200-239 mg/dL  High........................> or = 240 mg/dL       Triglycerides   Date Value Ref Range Status   10/25/2022 141 30 - 150 mg/dL Final     Comment:     The National Cholesterol Education Program (NCEP) has set the  following guidelines (reference values) for triglycerides:  Normal......................<150 mg/dL  Borderline High.............150-199 mg/dL  High........................200-499 mg/dL       HDL   Date  Value Ref Range Status   10/25/2022 40 40 - 75 mg/dL Final     Comment:     The National Cholesterol Education Program (NCEP) has set the  following guidelines (reference values) for HDL Cholesterol:  Low...............<40 mg/dL  Optimal...........>60 mg/dL       LDL Cholesterol   Date Value Ref Range Status   10/25/2022 31.8 (L) 63.0 - 159.0 mg/dL Final     Comment:     The National Cholesterol Education Program (NCEP) has set the  following guidelines (reference values) for LDL Cholesterol:  Optimal.......................<130 mg/dL  Borderline High...............130-159 mg/dL  High..........................160-189 mg/dL  Very High.....................>190 mg/dL     .  Side effects of the medication: none.    Patient has heart failure and is on no current diuretics.    Review of Systems      Objective:      Physical Exam  Vitals reviewed.   Constitutional:       Appearance: She is well-developed.   HENT:      Head: Normocephalic and atraumatic.      Mouth/Throat:      Pharynx: No oropharyngeal exudate.   Eyes:      General: No scleral icterus.        Right eye: No discharge.         Left eye: No discharge.      Pupils: Pupils are equal, round, and reactive to light.   Neck:      Thyroid: No thyromegaly.      Trachea: No tracheal deviation.   Cardiovascular:      Rate and Rhythm: Normal rate and regular rhythm.      Heart sounds: Normal heart sounds. No murmur heard.    No friction rub. No gallop.   Pulmonary:      Effort: Pulmonary effort is normal. No respiratory distress.      Breath sounds: Normal breath sounds. No wheezing or rales.   Chest:      Chest wall: No tenderness.   Abdominal:      General: Bowel sounds are normal. There is no distension.      Palpations: Abdomen is soft. There is no mass.      Tenderness: There is no abdominal tenderness. There is no guarding or rebound.   Musculoskeletal:         General: No tenderness. Normal range of motion.      Cervical back: Normal range of motion and neck  supple.   Skin:     General: Skin is warm and dry.      Coloration: Skin is not pale.      Findings: No erythema or rash.   Neurological:      Mental Status: She is alert and oriented to person, place, and time.   Psychiatric:         Behavior: Behavior normal.       Assessment:       1. Type 2 diabetes mellitus with diabetic peripheral angiopathy without gangrene, with long-term current use of insulin  - Comprehensive Metabolic Panel; Future  - Hemoglobin A1C; Future    2. Type 2 diabetes mellitus with retinopathy, with long-term current use of insulin, macular edema presence unspecified, unspecified laterality, unspecified retinopathy severity    3. Type 2 diabetes mellitus with stage 2 chronic kidney disease and hypertension    4. Hypertension associated with diabetes    5. Hyperlipidemia associated with type 2 diabetes mellitus    6. Chronic heart failure with preserved ejection fraction    7. Neurogenic claudication    8. Chronic kidney disease, stage 3a      Plan:       1/2/3.  Increase Trulicity to 4.5 mg weekly.  4. Continue norvasc 10 mg, Lopressor 50 mg b.i.d., Micardis 80 mg, HCTZ 12.5 mg  5. Continue Lipitor 40 mg.    6. Continue to monitor.  7. Try gabapentin 300 mg nightly as needed.    8. Monitor.

## 2023-01-12 ENCOUNTER — SPECIALTY PHARMACY (OUTPATIENT)
Dept: PHARMACY | Facility: CLINIC | Age: 63
End: 2023-01-12
Payer: COMMERCIAL

## 2023-01-12 NOTE — TELEPHONE ENCOUNTER
Specialty Pharmacy - Refill Coordination    Specialty Medication Orders Linked to Encounter      Flowsheet Row Most Recent Value   Medication #1 evolocumab (REPATHA SURECLICK) 140 mg/mL PnIj (Order#[id number], Rx#)            Refill Questions - Documented Responses      Flowsheet Row Most Recent Value   Patient Availability and HIPAA Verification    Does patient want to proceed with activity? Yes   HIPAA/medical authority confirmed? Yes   Relationship to patient of person spoken to? Self   Refill Screening Questions    Would patient like to speak to a pharmacist? No   When does the patient need to receive the medication? 01/20/23   Refill Delivery Questions    How will the patient receive the medication? MEDRx   When does the patient need to receive the medication? 01/20/23   Shipping Address Prescription   Address in [facility] confirmed and updated if neccessary? Yes   Expected Copay ($) 4.99   Is the patient able to afford the medication copay? Yes   Payment Method CC on file   Days supply of Refill 28   Supplies needed? No supplies needed   Refill activity completed? Yes   Refill activity plan Refill scheduled   Shipment/Pickup Date: 01/18/23            Current Outpatient Medications   Medication Sig    ACCU-CHEK EDIN PLUS METER Misc     albuterol (PROVENTIL/VENTOLIN HFA) 90 mcg/actuation inhaler Inhale 2 puffs into the lungs every 6 (six) hours as needed for Wheezing.    albuterol-ipratropium (DUO-NEB) 2.5 mg-0.5 mg/3 mL nebulizer solution Inhale 3 mLs into the lungs.    amLODIPine (NORVASC) 10 MG tablet Take 1 tablet (10 mg total) by mouth once daily.    apixaban (ELIQUIS) 2.5 mg Tab Take 1 tablet (2.5 mg total) by mouth 2 (two) times daily.    aspirin 81 mg Tab Take 81 mg by mouth. 1 Tablet Oral Every day    atorvastatin (LIPITOR) 40 MG tablet Take 1 tablet (40 mg total) by mouth every evening.    blood sugar diagnostic (ACCU-CHEK EDIN PLUS TEST STRP) Strp TEST BLOOD SUGARS 4 TIMES DAILY    cilostazoL  "(PLETAL) 100 MG Tab Take 1 tablet (100 mg total) by mouth 2 (two) times daily.    dapagliflozin (FARXIGA) 10 mg tablet Take 1 tablet (10 mg total) by mouth once daily.    diclofenac sodium (VOLTAREN) 1 % Gel Apply 2 g topically 3 (three) times daily. Apply to the area of pain 2-3x per day or night as needed    dulaglutide (TRULICITY) 4.5 mg/0.5 mL pen injector Inject 4.5 mg into the skin every 7 days.    EScitalopram oxalate (LEXAPRO) 10 MG tablet Take 1 tablet (10 mg total) by mouth once daily.    eszopiclone (LUNESTA) 2 MG Tab Take 1 tablet (2 mg total) by mouth every evening.    evolocumab (REPATHA SURECLICK) 140 mg/mL PnIj Inject 1 mL (140 mg total) into the skin every 14 (fourteen) days.    gabapentin (NEURONTIN) 300 MG capsule Take 1 capsule (300 mg total) by mouth every evening.    hydroCHLOROthiazide (HYDRODIURIL) 12.5 MG Tab Take 1 tablet (12.5 mg total) by mouth once daily.    isosorbide mononitrate (ISMO,MONOKET) 10 mg tablet Take 1 tablet (10 mg total) by mouth once daily.    metoprolol tartrate (LOPRESSOR) 50 MG tablet TAKE 1 TABLET BY MOUTH TWICE A DAY    multivitamin (THERAGRAN) per tablet Take 1 tablet by mouth once daily.    pen needle, diabetic (NOVOTWIST) 32 gauge x 1/5" Ndle Use 1 needle to inject insulin four times daily    potassium chloride SA (K-DUR,KLOR-CON) 20 MEQ tablet Take 1 tablet (20 mEq total) by mouth 2 (two) times daily.    promethazine (PHENERGAN) 12.5 MG Tab Take 1 tablet (12.5 mg total) by mouth every 8 (eight) hours as needed (nausea).    telmisartan (MICARDIS) 80 MG Tab Take 1 tablet (80 mg total) by mouth once daily. Stop losartan   Last reviewed on 1/6/2023  9:29 AM by Jasbir Haney MD    Review of patient's allergies indicates:   Allergen Reactions    Milk containing products      Causes GI distress    Last reviewed on  1/6/2023 9:29 AM by Jasbir Haney      Tasks added this encounter   2/10/2023 - Refill Call (Auto Added)   Tasks due within next 3 months   No tasks due.     [facility]  [address]  [facility] [address]  Phone: [phone]  Fax: [phone]

## 2023-01-25 RX ORDER — ESZOPICLONE 2 MG/1
2 TABLET, FILM COATED ORAL NIGHTLY
Qty: 30 TABLET | Refills: 0 | Status: SHIPPED | OUTPATIENT
Start: 2023-01-25 | End: 2023-02-26 | Stop reason: SDUPTHER

## 2023-02-10 ENCOUNTER — PATIENT MESSAGE (OUTPATIENT)
Dept: PHARMACY | Facility: CLINIC | Age: 63
End: 2023-02-10
Payer: COMMERCIAL

## 2023-02-10 ENCOUNTER — SPECIALTY PHARMACY (OUTPATIENT)
Dept: PHARMACY | Facility: CLINIC | Age: 63
End: 2023-02-10
Payer: COMMERCIAL

## 2023-02-10 NOTE — TELEPHONE ENCOUNTER
Specialty Pharmacy - Refill Coordination    Specialty Medication Orders Linked to Encounter      Flowsheet Row Most Recent Value   Medication #1 evolocumab (REPATHA SURECLICK) 140 mg/mL PnIj (Order#[id number], Rx#[id number])            Refill Questions - Documented Responses      Flowsheet Row Most Recent Value   Patient Availability and HIPAA Verification    Does patient want to proceed with activity? Yes   HIPAA/medical authority confirmed? Yes   Relationship to patient of person spoken to? Self   Refill Screening Questions    Would patient like to speak to a pharmacist? No   When does the patient need to receive the medication? 02/20/23   Refill Delivery Questions    How will the patient receive the medication? MEDRx   When does the patient need to receive the medication? 02/20/23   Shipping Address Prescription   Address in [facility] confirmed and updated if neccessary? Yes   Expected Copay ($) 4.99   Is the patient able to afford the medication copay? Yes   Payment Method CC on file   Days supply of Refill 28   Supplies needed? No supplies needed   Refill activity completed? Yes   Refill activity plan Refill scheduled   Shipment/Pickup Date: 02/14/23            Current Outpatient Medications   Medication Sig    ACCU-CHEK EDIN PLUS METER Misc     albuterol (PROVENTIL/VENTOLIN HFA) 90 mcg/actuation inhaler Inhale 2 puffs into the lungs every 6 (six) hours as needed for Wheezing.    albuterol-ipratropium (DUO-NEB) 2.5 mg-0.5 mg/3 mL nebulizer solution Inhale 3 mLs into the lungs.    amLODIPine (NORVASC) 10 MG tablet Take 1 tablet (10 mg total) by mouth once daily.    apixaban (ELIQUIS) 2.5 mg Tab Take 1 tablet (2.5 mg total) by mouth 2 (two) times daily.    aspirin 81 mg Tab Take 81 mg by mouth. 1 Tablet Oral Every day    atorvastatin (LIPITOR) 40 MG tablet Take 1 tablet (40 mg total) by mouth every evening.    blood sugar diagnostic (ACCU-CHEK EDIN PLUS TEST STRP) Strp TEST BLOOD SUGARS 4 TIMES DAILY     "cilostazoL (PLETAL) 100 MG Tab Take 1 tablet (100 mg total) by mouth 2 (two) times daily.    dapagliflozin (FARXIGA) 10 mg tablet Take 1 tablet (10 mg total) by mouth once daily.    diclofenac sodium (VOLTAREN) 1 % Gel Apply 2 g topically 3 (three) times daily. Apply to the area of pain 2-3x per day or night as needed    dulaglutide (TRULICITY) 4.5 mg/0.5 mL pen injector Inject 4.5 mg into the skin every 7 days.    EScitalopram oxalate (LEXAPRO) 10 MG tablet Take 1 tablet (10 mg total) by mouth once daily.    eszopiclone (LUNESTA) 2 MG Tab Take 1 tablet (2 mg total) by mouth every evening.    evolocumab (REPATHA SURECLICK) 140 mg/mL PnIj Inject 1 mL (140 mg total) into the skin every 14 (fourteen) days.    gabapentin (NEURONTIN) 300 MG capsule Take 1 capsule (300 mg total) by mouth every evening.    hydroCHLOROthiazide (HYDRODIURIL) 12.5 MG Tab Take 1 tablet (12.5 mg total) by mouth once daily.    isosorbide mononitrate (ISMO,MONOKET) 10 mg tablet Take 1 tablet (10 mg total) by mouth once daily.    metoprolol tartrate (LOPRESSOR) 50 MG tablet TAKE 1 TABLET BY MOUTH TWICE A DAY    multivitamin (THERAGRAN) per tablet Take 1 tablet by mouth once daily.    pen needle, diabetic (NOVOTWIST) 32 gauge x 1/5" Ndle Use 1 needle to inject insulin four times daily    potassium chloride SA (K-DUR,KLOR-CON) 20 MEQ tablet Take 1 tablet (20 mEq total) by mouth 2 (two) times daily.    promethazine (PHENERGAN) 12.5 MG Tab Take 1 tablet (12.5 mg total) by mouth every 8 (eight) hours as needed (nausea).    telmisartan (MICARDIS) 80 MG Tab Take 1 tablet (80 mg total) by mouth once daily. Stop losartan   Last reviewed on 1/6/2023  9:29 AM by Jasbir Haney MD    Review of patient's allergies indicates:   Allergen Reactions    Milk containing products      Causes GI distress    Last reviewed on  1/6/2023 9:29 AM by Jasbir Haney      Tasks added this encounter   3/13/2023 - Refill Call (Auto Added)   Tasks due within next 3 months   No tasks due. "     Jacy Elizabeth, PharmD  [facility]  [address]  [facility] [address]  Phone: [phone]  Fax: [phone]

## 2023-02-27 RX ORDER — PROMETHAZINE HYDROCHLORIDE 12.5 MG/1
12.5 TABLET ORAL EVERY 8 HOURS PRN
Qty: 30 TABLET | Refills: 1 | Status: ON HOLD | OUTPATIENT
Start: 2023-02-27 | End: 2023-06-13 | Stop reason: CLARIF

## 2023-02-27 RX ORDER — ESZOPICLONE 2 MG/1
2 TABLET, FILM COATED ORAL NIGHTLY
Qty: 30 TABLET | Refills: 0 | Status: SHIPPED | OUTPATIENT
Start: 2023-02-27 | End: 2023-04-05 | Stop reason: SDUPTHER

## 2023-02-27 NOTE — TELEPHONE ENCOUNTER
No new care gaps identified.  [facility] Embedded Care Gaps. Reference number: [id number]. 2/26/2023   6:08:19 PM CST

## 2023-03-07 ENCOUNTER — PATIENT MESSAGE (OUTPATIENT)
Dept: ADMINISTRATIVE | Facility: HOSPITAL | Age: 63
End: 2023-03-07
Payer: COMMERCIAL

## 2023-03-07 ENCOUNTER — PATIENT OUTREACH (OUTPATIENT)
Dept: ADMINISTRATIVE | Facility: HOSPITAL | Age: 63
End: 2023-03-07
Payer: COMMERCIAL

## 2023-03-13 ENCOUNTER — SPECIALTY PHARMACY (OUTPATIENT)
Dept: PHARMACY | Facility: CLINIC | Age: 63
End: 2023-03-13
Payer: COMMERCIAL

## 2023-03-13 NOTE — TELEPHONE ENCOUNTER
Specialty Pharmacy - Refill Coordination    Specialty Medication Orders Linked to Encounter      Flowsheet Row Most Recent Value   Medication #1 evolocumab (REPATHA SURECLICK) 140 mg/mL PnIj (Order#[id number], Rx#[id number])            Refill Questions - Documented Responses      Flowsheet Row Most Recent Value   Patient Availability and HIPAA Verification    Does patient want to proceed with activity? Yes   HIPAA/medical authority confirmed? Yes   Relationship to patient of person spoken to? Self   Refill Screening Questions    Would patient like to speak to a pharmacist? No   When does the patient need to receive the medication? 03/20/23   Refill Delivery Questions    How will the patient receive the medication? MEDRx   When does the patient need to receive the medication? 03/20/23   Shipping Address Home   Address in [facility] confirmed and updated if neccessary? Yes   Expected Copay ($) 4.99   Is the patient able to afford the medication copay? Yes   Payment Method CC on file   Days supply of Refill 28   Supplies needed? No supplies needed   Refill activity completed? Yes   Refill activity plan Refill scheduled   Shipment/Pickup Date: 03/15/23            Current Outpatient Medications   Medication Sig    ACCU-CHEK EDIN PLUS METER Misc     albuterol (PROVENTIL/VENTOLIN HFA) 90 mcg/actuation inhaler Inhale 2 puffs into the lungs every 6 (six) hours as needed for Wheezing.    albuterol-ipratropium (DUO-NEB) 2.5 mg-0.5 mg/3 mL nebulizer solution Inhale 3 mLs into the lungs.    amLODIPine (NORVASC) 10 MG tablet Take 1 tablet (10 mg total) by mouth once daily.    apixaban (ELIQUIS) 2.5 mg Tab Take 1 tablet (2.5 mg total) by mouth 2 (two) times daily.    aspirin 81 mg Tab Take 81 mg by mouth. 1 Tablet Oral Every day    atorvastatin (LIPITOR) 40 MG tablet Take 1 tablet (40 mg total) by mouth every evening.    blood sugar diagnostic (ACCU-CHEK EDIN PLUS TEST STRP) Strp TEST BLOOD SUGARS 4 TIMES DAILY     "cilostazoL (PLETAL) 100 MG Tab Take 1 tablet (100 mg total) by mouth 2 (two) times daily.    dapagliflozin (FARXIGA) 10 mg tablet Take 1 tablet (10 mg total) by mouth once daily.    diclofenac sodium (VOLTAREN) 1 % Gel Apply 2 g topically 3 (three) times daily. Apply to the area of pain 2-3x per day or night as needed    dulaglutide (TRULICITY) 4.5 mg/0.5 mL pen injector Inject 4.5 mg into the skin every 7 days.    EScitalopram oxalate (LEXAPRO) 10 MG tablet Take 1 tablet (10 mg total) by mouth once daily.    eszopiclone (LUNESTA) 2 MG Tab Take 1 tablet (2 mg total) by mouth every evening.    evolocumab (REPATHA SURECLICK) 140 mg/mL PnIj Inject 1 mL (140 mg total) into the skin every 14 (fourteen) days.    gabapentin (NEURONTIN) 300 MG capsule Take 1 capsule (300 mg total) by mouth every evening.    hydroCHLOROthiazide (HYDRODIURIL) 12.5 MG Tab Take 1 tablet (12.5 mg total) by mouth once daily.    isosorbide mononitrate (ISMO,MONOKET) 10 mg tablet Take 1 tablet (10 mg total) by mouth once daily.    metoprolol tartrate (LOPRESSOR) 50 MG tablet TAKE 1 TABLET BY MOUTH TWICE A DAY    multivitamin (THERAGRAN) per tablet Take 1 tablet by mouth once daily.    pen needle, diabetic (NOVOTWIST) 32 gauge x 1/5" Ndle Use 1 needle to inject insulin four times daily    potassium chloride SA (K-DUR,KLOR-CON) 20 MEQ tablet Take 1 tablet (20 mEq total) by mouth 2 (two) times daily.    promethazine (PHENERGAN) 12.5 MG Tab Take 1 tablet (12.5 mg total) by mouth every 8 (eight) hours as needed (nausea).    telmisartan (MICARDIS) 80 MG Tab Take 1 tablet (80 mg total) by mouth once daily. Stop losartan   Last reviewed on 1/6/2023  9:29 AM by Jasbir Haney MD    Review of patient's allergies indicates:   Allergen Reactions    Milk containing products      Causes GI distress    Last reviewed on  1/6/2023 9:29 AM by Jasbir Haney      Tasks added this encounter   4/10/2023 - Refill Call (Auto Added)   Tasks due within next 3 months   No tasks due. "     Olive Mckinney, Patient Care Assistant  [facility]  [address]  [facility] [address]  Phone: [phone]  Fax: [phone]

## 2023-03-20 ENCOUNTER — OFFICE VISIT (OUTPATIENT)
Dept: PODIATRY | Facility: CLINIC | Age: 63
End: 2023-03-20
Payer: COMMERCIAL

## 2023-03-20 VITALS
BODY MASS INDEX: 26.49 KG/M2 | SYSTOLIC BLOOD PRESSURE: 160 MMHG | HEART RATE: 75 BPM | OXYGEN SATURATION: 100 % | HEIGHT: 64 IN | DIASTOLIC BLOOD PRESSURE: 81 MMHG | RESPIRATION RATE: 18 BRPM | TEMPERATURE: 98 F | WEIGHT: 155.19 LBS

## 2023-03-20 DIAGNOSIS — M20.42 HAMMERTOE, BILATERAL: ICD-10-CM

## 2023-03-20 DIAGNOSIS — M20.41 HAMMERTOE, BILATERAL: ICD-10-CM

## 2023-03-20 DIAGNOSIS — M89.8X7 EXOSTOSIS OF LEFT FOOT: ICD-10-CM

## 2023-03-20 DIAGNOSIS — M79.672 FOOT PAIN, BILATERAL: ICD-10-CM

## 2023-03-20 DIAGNOSIS — E11.9 COMPREHENSIVE DIABETIC FOOT EXAMINATION, TYPE 2 DM, ENCOUNTER FOR: Primary | ICD-10-CM

## 2023-03-20 DIAGNOSIS — M79.671 FOOT PAIN, BILATERAL: ICD-10-CM

## 2023-03-20 DIAGNOSIS — M89.8X7 EXOSTOSIS OF RIGHT FOOT: ICD-10-CM

## 2023-03-20 PROCEDURE — 1160F RVW MEDS BY RX/DR IN RCRD: CPT | Mod: CPTII,S$GLB,, | Performed by: PODIATRIST

## 2023-03-20 PROCEDURE — 3079F PR MOST RECENT DIASTOLIC BLOOD PRESSURE 80-89 MM HG: ICD-10-PCS | Mod: CPTII,S$GLB,, | Performed by: PODIATRIST

## 2023-03-20 PROCEDURE — 3079F DIAST BP 80-89 MM HG: CPT | Mod: CPTII,S$GLB,, | Performed by: PODIATRIST

## 2023-03-20 PROCEDURE — 1160F PR REVIEW ALL MEDS BY PRESCRIBER/CLIN PHARMACIST DOCUMENTED: ICD-10-PCS | Mod: CPTII,S$GLB,, | Performed by: PODIATRIST

## 2023-03-20 PROCEDURE — 3008F BODY MASS INDEX DOCD: CPT | Mod: CPTII,S$GLB,, | Performed by: PODIATRIST

## 2023-03-20 PROCEDURE — 4010F PR ACE/ARB THEARPY RXD/TAKEN: ICD-10-PCS | Mod: CPTII,S$GLB,, | Performed by: PODIATRIST

## 2023-03-20 PROCEDURE — 1159F MED LIST DOCD IN RCRD: CPT | Mod: CPTII,S$GLB,, | Performed by: PODIATRIST

## 2023-03-20 PROCEDURE — 1159F PR MEDICATION LIST DOCUMENTED IN MEDICAL RECORD: ICD-10-PCS | Mod: CPTII,S$GLB,, | Performed by: PODIATRIST

## 2023-03-20 PROCEDURE — 99999 PR PBB SHADOW E&M-EST. PATIENT-LVL III: CPT | Mod: PBBFAC,,, | Performed by: PODIATRIST

## 2023-03-20 PROCEDURE — 4010F ACE/ARB THERAPY RXD/TAKEN: CPT | Mod: CPTII,S$GLB,, | Performed by: PODIATRIST

## 2023-03-20 PROCEDURE — 99999 PR PBB SHADOW E&M-EST. PATIENT-LVL III: ICD-10-PCS | Mod: PBBFAC,,, | Performed by: PODIATRIST

## 2023-03-20 PROCEDURE — 3077F PR MOST RECENT SYSTOLIC BLOOD PRESSURE >= 140 MM HG: ICD-10-PCS | Mod: CPTII,S$GLB,, | Performed by: PODIATRIST

## 2023-03-20 PROCEDURE — 99213 OFFICE O/P EST LOW 20 MIN: CPT | Mod: S$GLB,,, | Performed by: PODIATRIST

## 2023-03-20 PROCEDURE — 3008F PR BODY MASS INDEX (BMI) DOCUMENTED: ICD-10-PCS | Mod: CPTII,S$GLB,, | Performed by: PODIATRIST

## 2023-03-20 PROCEDURE — 99213 PR OFFICE/OUTPT VISIT, EST, LEVL III, 20-29 MIN: ICD-10-PCS | Mod: S$GLB,,, | Performed by: PODIATRIST

## 2023-03-20 PROCEDURE — 3077F SYST BP >= 140 MM HG: CPT | Mod: CPTII,S$GLB,, | Performed by: PODIATRIST

## 2023-03-20 RX ORDER — DULAGLUTIDE 3 MG/.5ML
INJECTION, SOLUTION SUBCUTANEOUS
COMMUNITY
Start: 2023-02-10 | End: 2023-04-05

## 2023-03-20 NOTE — PROGRESS NOTES
"Subjective:      Patient ID: Mariann Huff is a 63 y.o. female.    Chief Complaint: Diabetes Mellitus (Jasbir Haney MD-01/06/2023) and Nail Care    Mariann is a 63 y.o. female who returns to clinic for annual DM foot exam. Has bony prominence on both feet which hurt from time to time. Uses Voltaren gel and comfortable shoes which helps but inquiring about additional treatment options. Has hx of L dorsal midfoot nerve decompression and cheilectomy years ago.          Chief Complaint   Patient presents with    Diabetes Mellitus     Jasbir Haney MD-01/06/2023    Nail Care       Vitals:    03/20/23 0953   BP: (!) 160/81   Pulse: 75   Resp: 18   Temp: 98 °F (36.7 °C)   SpO2: 100%   Weight: 70.4 kg (155 lb 3.3 oz)   Height: 5' 4" (1.626 m)   PainSc: 0-No pain       Hemoglobin A1C   Date Value Ref Range Status   12/06/2022 9.1 (H) 4.0 - 5.6 % Final     Comment:     ADA Screening Guidelines:  5.7-6.4%  Consistent with prediabetes  >or=6.5%  Consistent with diabetes    High levels of fetal hemoglobin interfere with the HbA1C  assay. Heterozygous hemoglobin variants (HbS, HgC, etc)do  not significantly interfere with this assay.   However, presence of multiple variants may affect accuracy.     10/25/2022 8.8 (H) 4.0 - 5.6 % Final     Comment:     ADA Screening Guidelines:  5.7-6.4%  Consistent with prediabetes  >or=6.5%  Consistent with diabetes    High levels of fetal hemoglobin interfere with the HbA1C  assay. Heterozygous hemoglobin variants (HbS, HgC, etc)do  not significantly interfere with this assay.   However, presence of multiple variants may affect accuracy.     10/17/2022 9.0 (H) 4.0 - 5.6 % Final     Comment:     ADA Screening Guidelines:  5.7-6.4%  Consistent with prediabetes  >or=6.5%  Consistent with diabetes    High levels of fetal hemoglobin interfere with the HbA1C  assay. Heterozygous hemoglobin variants (HbS, HgC, etc)do  not significantly interfere with this assay.   However, presence of multiple variants " may affect accuracy.           Review of Systems   Constitutional: Negative for chills, fever and malaise/fatigue.   HENT:  Negative for hearing loss.    Cardiovascular:  Negative for claudication.   Respiratory:  Negative for shortness of breath.    Skin:  Positive for suspicious lesions. Negative for color change, flushing, nail changes, rash and unusual hair distribution.   Musculoskeletal:  Negative for joint pain and myalgias.        Heel pain L, skin lesion    Neurological:  Positive for paresthesias. Negative for loss of balance, numbness and sensory change.   Psychiatric/Behavioral:  Negative for altered mental status.          Objective:      Physical Exam  Vitals reviewed.   Constitutional:       Appearance: She is well-developed.   Cardiovascular:      Pulses:           Dorsalis pedis pulses are 1+ on the right side and 1+ on the left side.        Posterior tibial pulses are 2+ on the right side and 2+ on the left side.      Comments:    Musculoskeletal:      Right lower le+ Edema present.      Left lower le+ Edema present.      Right ankle: Decreased range of motion. Abnormal pulse.      Right Achilles Tendon: Nick's test negative.      Left ankle: Decreased range of motion. Abnormal pulse.      Left Achilles Tendon: Nick's test negative.      Right foot: Decreased range of motion.      Left foot: Decreased range of motion.      Comments: Bony hypertrophy noted to b/l dorsal 1st and 2nd met cuneiform joints. Hypermobility noted to 1st ray b/l with near complete collapse of medial longitudinal arch b/l with loading.     Bony hypertrophy at lateral base of 5th met b/l, R>L with mild pain on palpation.     Semi-reducible hammertoe contractures noted to toes 2-4 b/l-asymptomatic.     Otherwise rectus foot and toe position b/l foot with no major deformities noted. Mild equinus noted b/l ankle with < 10 deg DF noted. MMT 5/5 in DF/PF/Inv/Ev resistance with no reproduction of pain in any  direction b/l foot. Passive range of motion of ankle and pedal joints is painless b/l foot/ankle/toes.   Feet:      Right foot:      Protective Sensation: 5 sites tested.  2 sites sensed.      Left foot:      Protective Sensation: 5 sites tested.  2 sites sensed.   Skin:     General: Skin is dry.      Capillary Refill: Capillary refill takes more than 3 seconds.      Comments: 1-1.5 cm hyperkeratotic skin lesion to lateral L heel with hyperkeratotic tissue overlying a healed fragile, thin epithelium. No current open wound, erythema, drainage or SOI. Mild spongy white central core. No embedded capillaries noted. No underlying tissue damage. No pinpoint bleeding to lesion. Overall skin is thin, shiny, atrophic. Mild hyperpigmentation to skin of both ankles and/or feet, consistent with hemosiderin deposits. Loss of pedal hair b/l.    Neurological:      Mental Status: She is alert.      Sensory: Sensory deficit present.      Comments: diminished sensation noted to toes of b/L lower extremities   Psychiatric:         Behavior: Behavior normal. Behavior is cooperative.            Assessment:       Encounter Diagnoses   Name Primary?    Comprehensive diabetic foot examination, type 2 DM, encounter for Yes    Exostosis of left foot     Exostosis of right foot     Foot pain, bilateral     Hammertoe, bilateral            Plan:       Mariann was seen today for diabetes mellitus and nail care.    Diagnoses and all orders for this visit:    Comprehensive diabetic foot examination, type 2 DM, encounter for  -     DIABETIC SHOES FOR HOME USE    Exostosis of left foot  -     DIABETIC SHOES FOR HOME USE    Exostosis of right foot  -     DIABETIC SHOES FOR HOME USE    Foot pain, bilateral  -     DIABETIC SHOES FOR HOME USE    Hammertoe, bilateral  -     DIABETIC SHOES FOR HOME USE        I counseled the patient on her conditions, their implications and medical management.    - Shoe inspection. Diabetic Foot Education. Patient  reminded of the importance of good nutrition and blood sugar control to help prevent podiatric complications of diabetes. Patient instructed on proper foot hygeine. We discussed wearing proper shoe gear, daily foot inspections, never walking without protective shoe gear, caution putting sharp instruments to feet     - Discussed DM foot care:  Wear comfortable, proper fitting shoes. Wash feet daily. Dry well. After drying, apply moisturizer to feet (no lotion to webspaces). Inspect feet daily for skin breaks, blisters, swelling, or redness. Wear cotton socks (preferably white)  Change socks every day. Do NOT walk barefoot. Do NOT use heating pads or warm/hot water soaks      Rx diabetic shoes with custom molded inserts to be worn at all times while ambulating. Prescription provided with list of local retailers.     Discussed regular and routine moisturizer to skin of both feet to help improve dry skin. Advised to apply twice daily until resolution of symptoms. Avoid between toes.     Long discussion with patient regarding appropriate, supportive and comfortable shoes. Recommended supportive style shoe brands with adequate arch supports to alleviate abnormal pressure and improve stability of foot while walking. Avoid flat shoes and barefoot walking as these will exacerbate or worsen symptoms.     RTC 1 year, sooner PRN    Simponi Counseling:  I discussed with the patient the risks of golimumab including but not limited to myelosuppression, immunosuppression, autoimmune hepatitis, demyelinating diseases, lymphoma, and serious infections.  The patient understands that monitoring is required including a PPD at baseline and must alert us or the primary physician if symptoms of infection or other concerning signs are noted.

## 2023-04-05 ENCOUNTER — LAB VISIT (OUTPATIENT)
Dept: LAB | Facility: HOSPITAL | Age: 63
End: 2023-04-05
Attending: INTERNAL MEDICINE
Payer: COMMERCIAL

## 2023-04-05 ENCOUNTER — TELEPHONE (OUTPATIENT)
Dept: INTERNAL MEDICINE | Facility: CLINIC | Age: 63
End: 2023-04-05

## 2023-04-05 ENCOUNTER — OFFICE VISIT (OUTPATIENT)
Dept: INTERNAL MEDICINE | Facility: CLINIC | Age: 63
End: 2023-04-05
Payer: COMMERCIAL

## 2023-04-05 VITALS
HEIGHT: 64 IN | WEIGHT: 154.31 LBS | DIASTOLIC BLOOD PRESSURE: 80 MMHG | HEART RATE: 74 BPM | BODY MASS INDEX: 26.34 KG/M2 | OXYGEN SATURATION: 98 % | SYSTOLIC BLOOD PRESSURE: 122 MMHG | RESPIRATION RATE: 16 BRPM | TEMPERATURE: 98 F

## 2023-04-05 DIAGNOSIS — E11.319 TYPE 2 DIABETES MELLITUS WITH RETINOPATHY, WITH LONG-TERM CURRENT USE OF INSULIN, MACULAR EDEMA PRESENCE UNSPECIFIED, UNSPECIFIED LATERALITY, UNSPECIFIED RETINOPATHY SEVERITY: Chronic | ICD-10-CM

## 2023-04-05 DIAGNOSIS — Z79.4 TYPE 2 DIABETES MELLITUS WITH RETINOPATHY, WITH LONG-TERM CURRENT USE OF INSULIN, MACULAR EDEMA PRESENCE UNSPECIFIED, UNSPECIFIED LATERALITY, UNSPECIFIED RETINOPATHY SEVERITY: Chronic | ICD-10-CM

## 2023-04-05 DIAGNOSIS — I50.32 CHRONIC HEART FAILURE WITH PRESERVED EJECTION FRACTION: Chronic | ICD-10-CM

## 2023-04-05 DIAGNOSIS — E11.59 HYPERTENSION ASSOCIATED WITH DIABETES: Primary | ICD-10-CM

## 2023-04-05 DIAGNOSIS — M54.9 DORSALGIA, UNSPECIFIED: ICD-10-CM

## 2023-04-05 DIAGNOSIS — I15.2 HYPERTENSION ASSOCIATED WITH DIABETES: Primary | ICD-10-CM

## 2023-04-05 DIAGNOSIS — I15.2 HYPERTENSION ASSOCIATED WITH DIABETES: Chronic | ICD-10-CM

## 2023-04-05 DIAGNOSIS — M54.16 LUMBAR RADICULOPATHY: ICD-10-CM

## 2023-04-05 DIAGNOSIS — E11.59 HYPERTENSION ASSOCIATED WITH DIABETES: Chronic | ICD-10-CM

## 2023-04-05 DIAGNOSIS — E11.59 HYPERTENSION ASSOCIATED WITH DIABETES: ICD-10-CM

## 2023-04-05 DIAGNOSIS — E78.5 HYPERLIPIDEMIA ASSOCIATED WITH TYPE 2 DIABETES MELLITUS: Chronic | ICD-10-CM

## 2023-04-05 DIAGNOSIS — E11.69 HYPERLIPIDEMIA ASSOCIATED WITH TYPE 2 DIABETES MELLITUS: Chronic | ICD-10-CM

## 2023-04-05 DIAGNOSIS — I10 HTN (HYPERTENSION), BENIGN: Chronic | ICD-10-CM

## 2023-04-05 DIAGNOSIS — I73.9 PAD (PERIPHERAL ARTERY DISEASE): Chronic | ICD-10-CM

## 2023-04-05 DIAGNOSIS — Z79.4 TYPE 2 DIABETES MELLITUS WITH DIABETIC PERIPHERAL ANGIOPATHY WITHOUT GANGRENE, WITH LONG-TERM CURRENT USE OF INSULIN: Primary | Chronic | ICD-10-CM

## 2023-04-05 DIAGNOSIS — I12.9 TYPE 2 DIABETES MELLITUS WITH STAGE 2 CHRONIC KIDNEY DISEASE AND HYPERTENSION: Chronic | ICD-10-CM

## 2023-04-05 DIAGNOSIS — I15.2 HYPERTENSION ASSOCIATED WITH DIABETES: ICD-10-CM

## 2023-04-05 DIAGNOSIS — E11.22 TYPE 2 DIABETES MELLITUS WITH STAGE 2 CHRONIC KIDNEY DISEASE AND HYPERTENSION: Chronic | ICD-10-CM

## 2023-04-05 DIAGNOSIS — N18.2 TYPE 2 DIABETES MELLITUS WITH STAGE 2 CHRONIC KIDNEY DISEASE AND HYPERTENSION: Chronic | ICD-10-CM

## 2023-04-05 DIAGNOSIS — E11.51 TYPE 2 DIABETES MELLITUS WITH DIABETIC PERIPHERAL ANGIOPATHY WITHOUT GANGRENE, WITH LONG-TERM CURRENT USE OF INSULIN: Primary | Chronic | ICD-10-CM

## 2023-04-05 LAB
ALBUMIN SERPL BCP-MCNC: 3.6 G/DL (ref 3.5–5.2)
ALBUMIN SERPL BCP-MCNC: 3.6 G/DL (ref 3.5–5.2)
ALP SERPL-CCNC: 104 U/L (ref 55–135)
ALP SERPL-CCNC: 104 U/L (ref 55–135)
ALT SERPL W/O P-5'-P-CCNC: 16 U/L (ref 10–44)
ALT SERPL W/O P-5'-P-CCNC: 16 U/L (ref 10–44)
ANION GAP SERPL CALC-SCNC: 10 MMOL/L (ref 8–16)
AST SERPL-CCNC: 19 U/L (ref 10–40)
AST SERPL-CCNC: 19 U/L (ref 10–40)
BILIRUB DIRECT SERPL-MCNC: 0.3 MG/DL (ref 0.1–0.3)
BILIRUB SERPL-MCNC: 0.8 MG/DL (ref 0.1–1)
BILIRUB SERPL-MCNC: 0.8 MG/DL (ref 0.1–1)
BUN SERPL-MCNC: 21 MG/DL (ref 8–23)
CALCIUM SERPL-MCNC: 9.8 MG/DL (ref 8.7–10.5)
CHLORIDE SERPL-SCNC: 107 MMOL/L (ref 95–110)
CO2 SERPL-SCNC: 25 MMOL/L (ref 23–29)
CREAT SERPL-MCNC: 1 MG/DL (ref 0.5–1.4)
EST. GFR  (NO RACE VARIABLE): >60 ML/MIN/1.73 M^2
ESTIMATED AVG GLUCOSE: 189 MG/DL (ref 68–131)
GLUCOSE SERPL-MCNC: 145 MG/DL (ref 70–110)
HBA1C MFR BLD: 8.2 % (ref 4–5.6)
POTASSIUM SERPL-SCNC: 4.6 MMOL/L (ref 3.5–5.1)
PROT SERPL-MCNC: 7.3 G/DL (ref 6–8.4)
PROT SERPL-MCNC: 7.3 G/DL (ref 6–8.4)
SODIUM SERPL-SCNC: 142 MMOL/L (ref 136–145)

## 2023-04-05 PROCEDURE — 3008F PR BODY MASS INDEX (BMI) DOCUMENTED: ICD-10-PCS | Mod: CPTII,S$GLB,, | Performed by: INTERNAL MEDICINE

## 2023-04-05 PROCEDURE — 99999 PR PBB SHADOW E&M-EST. PATIENT-LVL V: CPT | Mod: PBBFAC,,, | Performed by: INTERNAL MEDICINE

## 2023-04-05 PROCEDURE — 99214 OFFICE O/P EST MOD 30 MIN: CPT | Mod: S$GLB,,, | Performed by: INTERNAL MEDICINE

## 2023-04-05 PROCEDURE — 3066F PR DOCUMENTATION OF TREATMENT FOR NEPHROPATHY: ICD-10-PCS | Mod: CPTII,S$GLB,, | Performed by: INTERNAL MEDICINE

## 2023-04-05 PROCEDURE — 1160F RVW MEDS BY RX/DR IN RCRD: CPT | Mod: CPTII,S$GLB,, | Performed by: INTERNAL MEDICINE

## 2023-04-05 PROCEDURE — 3061F PR NEG MICROALBUMINURIA RESULT DOCUMENTED/REVIEW: ICD-10-PCS | Mod: CPTII,S$GLB,, | Performed by: INTERNAL MEDICINE

## 2023-04-05 PROCEDURE — 1159F PR MEDICATION LIST DOCUMENTED IN MEDICAL RECORD: ICD-10-PCS | Mod: CPTII,S$GLB,, | Performed by: INTERNAL MEDICINE

## 2023-04-05 PROCEDURE — 83036 HEMOGLOBIN GLYCOSYLATED A1C: CPT | Performed by: INTERNAL MEDICINE

## 2023-04-05 PROCEDURE — 1160F PR REVIEW ALL MEDS BY PRESCRIBER/CLIN PHARMACIST DOCUMENTED: ICD-10-PCS | Mod: CPTII,S$GLB,, | Performed by: INTERNAL MEDICINE

## 2023-04-05 PROCEDURE — 36415 COLL VENOUS BLD VENIPUNCTURE: CPT | Mod: PO | Performed by: INTERNAL MEDICINE

## 2023-04-05 PROCEDURE — 3079F DIAST BP 80-89 MM HG: CPT | Mod: CPTII,S$GLB,, | Performed by: INTERNAL MEDICINE

## 2023-04-05 PROCEDURE — 3052F PR MOST RECENT HEMOGLOBIN A1C LEVEL 8.0 - < 9.0%: ICD-10-PCS | Mod: CPTII,S$GLB,, | Performed by: INTERNAL MEDICINE

## 2023-04-05 PROCEDURE — 99999 PR PBB SHADOW E&M-EST. PATIENT-LVL V: ICD-10-PCS | Mod: PBBFAC,,, | Performed by: INTERNAL MEDICINE

## 2023-04-05 PROCEDURE — 3052F HG A1C>EQUAL 8.0%<EQUAL 9.0%: CPT | Mod: CPTII,S$GLB,, | Performed by: INTERNAL MEDICINE

## 2023-04-05 PROCEDURE — 4010F PR ACE/ARB THEARPY RXD/TAKEN: ICD-10-PCS | Mod: CPTII,S$GLB,, | Performed by: INTERNAL MEDICINE

## 2023-04-05 PROCEDURE — 99214 PR OFFICE/OUTPT VISIT, EST, LEVL IV, 30-39 MIN: ICD-10-PCS | Mod: S$GLB,,, | Performed by: INTERNAL MEDICINE

## 2023-04-05 PROCEDURE — 3008F BODY MASS INDEX DOCD: CPT | Mod: CPTII,S$GLB,, | Performed by: INTERNAL MEDICINE

## 2023-04-05 PROCEDURE — 3061F NEG MICROALBUMINURIA REV: CPT | Mod: CPTII,S$GLB,, | Performed by: INTERNAL MEDICINE

## 2023-04-05 PROCEDURE — 3079F PR MOST RECENT DIASTOLIC BLOOD PRESSURE 80-89 MM HG: ICD-10-PCS | Mod: CPTII,S$GLB,, | Performed by: INTERNAL MEDICINE

## 2023-04-05 PROCEDURE — 3074F PR MOST RECENT SYSTOLIC BLOOD PRESSURE < 130 MM HG: ICD-10-PCS | Mod: CPTII,S$GLB,, | Performed by: INTERNAL MEDICINE

## 2023-04-05 PROCEDURE — 80053 COMPREHEN METABOLIC PANEL: CPT | Performed by: INTERNAL MEDICINE

## 2023-04-05 PROCEDURE — 4010F ACE/ARB THERAPY RXD/TAKEN: CPT | Mod: CPTII,S$GLB,, | Performed by: INTERNAL MEDICINE

## 2023-04-05 PROCEDURE — 3074F SYST BP LT 130 MM HG: CPT | Mod: CPTII,S$GLB,, | Performed by: INTERNAL MEDICINE

## 2023-04-05 PROCEDURE — 1159F MED LIST DOCD IN RCRD: CPT | Mod: CPTII,S$GLB,, | Performed by: INTERNAL MEDICINE

## 2023-04-05 PROCEDURE — 3066F NEPHROPATHY DOC TX: CPT | Mod: CPTII,S$GLB,, | Performed by: INTERNAL MEDICINE

## 2023-04-05 RX ORDER — DAPAGLIFLOZIN 10 MG/1
10 TABLET, FILM COATED ORAL DAILY
Qty: 90 TABLET | Refills: 3 | Status: ON HOLD | OUTPATIENT
Start: 2023-04-05 | End: 2023-09-01 | Stop reason: HOSPADM

## 2023-04-05 RX ORDER — AMLODIPINE BESYLATE 10 MG/1
10 TABLET ORAL DAILY
Qty: 90 TABLET | Refills: 2 | Status: ON HOLD | OUTPATIENT
Start: 2023-04-05 | End: 2023-08-07 | Stop reason: HOSPADM

## 2023-04-05 RX ORDER — ATORVASTATIN CALCIUM 40 MG/1
40 TABLET, FILM COATED ORAL NIGHTLY
Qty: 90 TABLET | Refills: 3 | Status: SHIPPED | OUTPATIENT
Start: 2023-04-05 | End: 2024-04-04

## 2023-04-05 RX ORDER — ISOSORBIDE MONONITRATE 10 MG/1
10 TABLET ORAL DAILY
Qty: 90 TABLET | Refills: 2 | Status: ON HOLD | OUTPATIENT
Start: 2023-04-05 | End: 2023-08-07 | Stop reason: HOSPADM

## 2023-04-05 RX ORDER — GABAPENTIN 300 MG/1
CAPSULE ORAL
Qty: 90 CAPSULE | Refills: 0 | Status: SHIPPED | OUTPATIENT
Start: 2023-04-05

## 2023-04-05 RX ORDER — ESZOPICLONE 2 MG/1
2 TABLET, FILM COATED ORAL NIGHTLY
Qty: 30 TABLET | Refills: 0 | Status: SHIPPED | OUTPATIENT
Start: 2023-04-05 | End: 2023-05-06 | Stop reason: SDUPTHER

## 2023-04-05 RX ORDER — GLIPIZIDE 10 MG/1
10 TABLET, FILM COATED, EXTENDED RELEASE ORAL
Qty: 90 TABLET | Refills: 3 | Status: ON HOLD | OUTPATIENT
Start: 2023-04-05 | End: 2023-09-01 | Stop reason: HOSPADM

## 2023-04-05 NOTE — TELEPHONE ENCOUNTER
No new care gaps identified.  [facility] Embedded Care Gaps. Reference number: [id number]. 4/05/2023   12:35:33 PM CDT

## 2023-04-05 NOTE — TELEPHONE ENCOUNTER
No new care gaps identified.  [facility] Embedded Care Gaps. Reference number: [id number]. 4/05/2023   2:43:56 PM CDT

## 2023-04-05 NOTE — TELEPHONE ENCOUNTER
Refill Routing Note   Medication(s) are not appropriate for processing by [facility] for the following reason(s):      Responsible provider unclear    [facility] action(s):  Defer       Medication Therapy Plan: Current dose has not been previously prescribed by pcp      Appointments  past 12m or future 3m with PCP    Date Provider   Last Visit   4/5/2023 Jasbir Haney MD   Next Visit   5/17/2023 Jasbir Haney MD   ED visits in past 90 days: 0        Note composed:3:03 PM 04/05/2023

## 2023-04-05 NOTE — PROGRESS NOTES
Subjective:       Patient ID: Mariann Huff is a 63 y.o. female.    Chief Complaint: Follow-up and Diabetes    HPI    63-year-old female with peripheral arterial disease here for follow-up.    Diabetes-Trulicity 4.5 mg weekly, farxiga 10 mg.  She does not check her BG at home.  She has the glucometer.  She has recently lost her mother in law.  Lab Results   Component Value Date    HGBA1C 8.8 (H) 03/29/2023    HGBA1C 9.1 (H) 12/06/2022    HGBA1C 8.8 (H) 10/25/2022     Lab Results   Component Value Date    GLUF 217 (H) 09/15/2014    LDLCALC 64.8 02/14/2023    CREATININE 1.01 03/29/2023     HTN -  Patient's co morbidities include: Diabetes. Patient is currently on norvasc 10 mg, HCTZ 12.5 mg, Lopressor 50 mg b.i.d., Micardis 80 mg. She does check her BP at home, and it runs 120s/60s. Side effects of medications note: none. Denies headaches, blurred vision, chest pain, shortness of breath, nausea.    She reports that she has pain.  She is on gabapentin for pain that starts in her right lower back and radiates down her right leg.  She sees neurosurgery on May 5th.  She had a compression fracture and arthritis of her lower back noted on MRI lumbar spine 8/3/23.  Her pain is worsening since she last saw me.  It hurts when she sits in her car.  She gets leg cramps.      HLD - Patient is currently on lipitor 40 mg.  Her last lipid panel was   Cholesterol   Date Value Ref Range Status   02/14/2023 132 120 - 199 mg/dL Final     Comment:     The National Cholesterol Education Program (NCEP) has set the  following guidelines (reference ranges) for Cholesterol:  Optimal.....................<200 mg/dL  Borderline High.............200-239 mg/dL  High........................> or = 240 mg/dL       Triglycerides   Date Value Ref Range Status   02/14/2023 76 30 - 150 mg/dL Final     Comment:     The National Cholesterol Education Program (NCEP) has set the  following guidelines (reference values) for  triglycerides:  Normal......................<150 mg/dL  Borderline High.............150-199 mg/dL  High........................200-499 mg/dL       HDL   Date Value Ref Range Status   02/14/2023 52 40 - 75 mg/dL Final     Comment:     The National Cholesterol Education Program (NCEP) has set the  following guidelines (reference values) for HDL Cholesterol:  Low...............<40 mg/dL  Optimal...........>60 mg/dL       LDL Cholesterol   Date Value Ref Range Status   02/14/2023 64.8 63.0 - 159.0 mg/dL Final     Comment:     The National Cholesterol Education Program (NCEP) has set the  following guidelines (reference values) for LDL Cholesterol:  Optimal.......................<130 mg/dL  Borderline High...............130-159 mg/dL  High..........................160-189 mg/dL  Very High.....................>190 mg/dL     .  Side effects of the medication: none.    Patient has heart failure and is on no current diuretic.    Review of Systems      Objective:      Physical Exam  Vitals reviewed.   Constitutional:       Appearance: She is well-developed.   HENT:      Head: Normocephalic and atraumatic.      Mouth/Throat:      Pharynx: No oropharyngeal exudate.   Eyes:      General: No scleral icterus.        Right eye: No discharge.         Left eye: No discharge.      Pupils: Pupils are equal, round, and reactive to light.   Neck:      Thyroid: No thyromegaly.      Trachea: No tracheal deviation.   Cardiovascular:      Rate and Rhythm: Normal rate and regular rhythm.      Heart sounds: Normal heart sounds. No murmur heard.    No friction rub. No gallop.   Pulmonary:      Effort: Pulmonary effort is normal. No respiratory distress.      Breath sounds: Normal breath sounds. No wheezing or rales.   Chest:      Chest wall: No tenderness.   Abdominal:      General: Bowel sounds are normal. There is no distension.      Palpations: Abdomen is soft. There is no mass.      Tenderness: There is no abdominal tenderness. There is no  guarding or rebound.   Musculoskeletal:         General: No tenderness. Normal range of motion.      Cervical back: Normal range of motion and neck supple.   Skin:     General: Skin is warm and dry.      Coloration: Skin is not pale.      Findings: No erythema or rash.   Neurological:      Mental Status: She is alert and oriented to person, place, and time.   Psychiatric:         Behavior: Behavior normal.       Assessment:       1. Type 2 diabetes mellitus with diabetic peripheral angiopathy without gangrene, with long-term current use of insulin  - dapagliflozin (FARXIGA) 10 mg tablet; Take 1 tablet (10 mg total) by mouth once daily.  Dispense: 90 tablet; Refill: 3    2. Type 2 diabetes mellitus with retinopathy, with long-term current use of insulin, macular edema presence unspecified, unspecified laterality, unspecified retinopathy severity  - dapagliflozin (FARXIGA) 10 mg tablet; Take 1 tablet (10 mg total) by mouth once daily.  Dispense: 90 tablet; Refill: 3    3. Type 2 diabetes mellitus with stage 2 chronic kidney disease and hypertension  - dapagliflozin (FARXIGA) 10 mg tablet; Take 1 tablet (10 mg total) by mouth once daily.  Dispense: 90 tablet; Refill: 3    4. Hypertension associated with diabetes    5. Hyperlipidemia associated with type 2 diabetes mellitus    6. PAD (peripheral artery disease)    7. Chronic heart failure with preserved ejection fraction    8. Lumbar radiculopathy  - MRI Lumbar Spine Without Contrast; Future    9. Dorsalgia, unspecified  - MRI Lumbar Spine Without Contrast; Future      Plan:       1/2/3.  Continue Trulicity 4.5 mg, Farxiga 10 mg.  Add glipizide 10 mg daily.  4. Continue norvasc 10 mg, HCTZ 12.5 mg, Lopressor 50 mg b.i.d., Micardis 80 mg  5. Continue Repatha 140 mg.  6.  Continue Repatha.    7/8.  Check MRI lumbar spine.  Increase gabapentin to 300 mg in the morning and 600 mg in the evening.  7. Monitor.

## 2023-04-13 ENCOUNTER — SPECIALTY PHARMACY (OUTPATIENT)
Dept: PHARMACY | Facility: CLINIC | Age: 63
End: 2023-04-13
Payer: COMMERCIAL

## 2023-04-14 ENCOUNTER — TELEPHONE (OUTPATIENT)
Dept: CARDIOLOGY | Facility: CLINIC | Age: 63
End: 2023-04-14
Payer: COMMERCIAL

## 2023-04-14 DIAGNOSIS — E11.22 TYPE 2 DIABETES MELLITUS WITH STAGE 2 CHRONIC KIDNEY DISEASE AND HYPERTENSION: ICD-10-CM

## 2023-04-14 DIAGNOSIS — I12.9 TYPE 2 DIABETES MELLITUS WITH STAGE 2 CHRONIC KIDNEY DISEASE AND HYPERTENSION: ICD-10-CM

## 2023-04-14 DIAGNOSIS — Z79.4 TYPE 2 DIABETES MELLITUS WITH DIABETIC PERIPHERAL ANGIOPATHY WITHOUT GANGRENE, WITH LONG-TERM CURRENT USE OF INSULIN: ICD-10-CM

## 2023-04-14 DIAGNOSIS — I25.10 CORONARY ARTERY DISEASE INVOLVING NATIVE CORONARY ARTERY OF NATIVE HEART WITHOUT ANGINA PECTORIS: Primary | ICD-10-CM

## 2023-04-14 DIAGNOSIS — N18.2 TYPE 2 DIABETES MELLITUS WITH STAGE 2 CHRONIC KIDNEY DISEASE AND HYPERTENSION: ICD-10-CM

## 2023-04-14 DIAGNOSIS — E11.51 TYPE 2 DIABETES MELLITUS WITH DIABETIC PERIPHERAL ANGIOPATHY WITHOUT GANGRENE, WITH LONG-TERM CURRENT USE OF INSULIN: ICD-10-CM

## 2023-05-03 ENCOUNTER — OFFICE VISIT (OUTPATIENT)
Dept: NEUROSURGERY | Facility: CLINIC | Age: 63
End: 2023-05-03
Payer: COMMERCIAL

## 2023-05-03 ENCOUNTER — TELEPHONE (OUTPATIENT)
Dept: NEUROSURGERY | Facility: CLINIC | Age: 63
End: 2023-05-03
Payer: COMMERCIAL

## 2023-05-03 ENCOUNTER — PATIENT OUTREACH (OUTPATIENT)
Dept: ADMINISTRATIVE | Facility: HOSPITAL | Age: 63
End: 2023-05-03
Payer: COMMERCIAL

## 2023-05-03 VITALS
DIASTOLIC BLOOD PRESSURE: 80 MMHG | HEIGHT: 64 IN | SYSTOLIC BLOOD PRESSURE: 122 MMHG | HEART RATE: 74 BPM | WEIGHT: 154.31 LBS | BODY MASS INDEX: 26.34 KG/M2

## 2023-05-03 DIAGNOSIS — M53.3 SI (SACROILIAC) JOINT DYSFUNCTION: ICD-10-CM

## 2023-05-03 DIAGNOSIS — M53.3 SACROILIAC JOINT PAIN: Primary | ICD-10-CM

## 2023-05-03 DIAGNOSIS — G89.4 CHRONIC PAIN SYNDROME: ICD-10-CM

## 2023-05-03 DIAGNOSIS — M96.1 FAILED BACK SURGICAL SYNDROME: ICD-10-CM

## 2023-05-03 DIAGNOSIS — Z98.1 S/P LUMBAR FUSION: Primary | ICD-10-CM

## 2023-05-03 DIAGNOSIS — M54.16 LUMBAR RADICULOPATHY: Primary | ICD-10-CM

## 2023-05-03 PROCEDURE — 3008F BODY MASS INDEX DOCD: CPT | Mod: CPTII,S$GLB,, | Performed by: NEUROLOGICAL SURGERY

## 2023-05-03 PROCEDURE — 99999 PR PBB SHADOW E&M-EST. PATIENT-LVL V: ICD-10-PCS | Mod: PBBFAC,,, | Performed by: NEUROLOGICAL SURGERY

## 2023-05-03 PROCEDURE — 3008F PR BODY MASS INDEX (BMI) DOCUMENTED: ICD-10-PCS | Mod: CPTII,S$GLB,, | Performed by: NEUROLOGICAL SURGERY

## 2023-05-03 PROCEDURE — 3079F DIAST BP 80-89 MM HG: CPT | Mod: CPTII,S$GLB,, | Performed by: NEUROLOGICAL SURGERY

## 2023-05-03 PROCEDURE — 99999 PR PBB SHADOW E&M-EST. PATIENT-LVL V: CPT | Mod: PBBFAC,,, | Performed by: NEUROLOGICAL SURGERY

## 2023-05-03 PROCEDURE — 99214 OFFICE O/P EST MOD 30 MIN: CPT | Mod: S$GLB,,, | Performed by: NEUROLOGICAL SURGERY

## 2023-05-03 PROCEDURE — 1159F PR MEDICATION LIST DOCUMENTED IN MEDICAL RECORD: ICD-10-PCS | Mod: CPTII,S$GLB,, | Performed by: NEUROLOGICAL SURGERY

## 2023-05-03 PROCEDURE — 4010F ACE/ARB THERAPY RXD/TAKEN: CPT | Mod: CPTII,S$GLB,, | Performed by: NEUROLOGICAL SURGERY

## 2023-05-03 PROCEDURE — 99214 PR OFFICE/OUTPT VISIT, EST, LEVL IV, 30-39 MIN: ICD-10-PCS | Mod: S$GLB,,, | Performed by: NEUROLOGICAL SURGERY

## 2023-05-03 PROCEDURE — 1159F MED LIST DOCD IN RCRD: CPT | Mod: CPTII,S$GLB,, | Performed by: NEUROLOGICAL SURGERY

## 2023-05-03 PROCEDURE — 3052F HG A1C>EQUAL 8.0%<EQUAL 9.0%: CPT | Mod: CPTII,S$GLB,, | Performed by: NEUROLOGICAL SURGERY

## 2023-05-03 PROCEDURE — 3052F PR MOST RECENT HEMOGLOBIN A1C LEVEL 8.0 - < 9.0%: ICD-10-PCS | Mod: CPTII,S$GLB,, | Performed by: NEUROLOGICAL SURGERY

## 2023-05-03 PROCEDURE — 3061F PR NEG MICROALBUMINURIA RESULT DOCUMENTED/REVIEW: ICD-10-PCS | Mod: CPTII,S$GLB,, | Performed by: NEUROLOGICAL SURGERY

## 2023-05-03 PROCEDURE — 3061F NEG MICROALBUMINURIA REV: CPT | Mod: CPTII,S$GLB,, | Performed by: NEUROLOGICAL SURGERY

## 2023-05-03 PROCEDURE — 3079F PR MOST RECENT DIASTOLIC BLOOD PRESSURE 80-89 MM HG: ICD-10-PCS | Mod: CPTII,S$GLB,, | Performed by: NEUROLOGICAL SURGERY

## 2023-05-03 PROCEDURE — 4010F PR ACE/ARB THEARPY RXD/TAKEN: ICD-10-PCS | Mod: CPTII,S$GLB,, | Performed by: NEUROLOGICAL SURGERY

## 2023-05-03 PROCEDURE — 3066F NEPHROPATHY DOC TX: CPT | Mod: CPTII,S$GLB,, | Performed by: NEUROLOGICAL SURGERY

## 2023-05-03 PROCEDURE — 3066F PR DOCUMENTATION OF TREATMENT FOR NEPHROPATHY: ICD-10-PCS | Mod: CPTII,S$GLB,, | Performed by: NEUROLOGICAL SURGERY

## 2023-05-03 PROCEDURE — 3074F SYST BP LT 130 MM HG: CPT | Mod: CPTII,S$GLB,, | Performed by: NEUROLOGICAL SURGERY

## 2023-05-03 PROCEDURE — 3074F PR MOST RECENT SYSTOLIC BLOOD PRESSURE < 130 MM HG: ICD-10-PCS | Mod: CPTII,S$GLB,, | Performed by: NEUROLOGICAL SURGERY

## 2023-05-03 NOTE — PROGRESS NOTES
Health Maintenance Due   Topic Date Due    Shingles Vaccine (1 of 2) Never done    Pneumococcal Vaccines (Age 0-64) (2 - PCV) 12/06/2020    Foot Exam  04/20/2022     Chart reviewed.   Immunizations: Reconciled  Orders placed: N/A  Upcoming appts to satisfy JENNIFER topics: Optometry 6/27/2023

## 2023-05-03 NOTE — PROGRESS NOTES
NEUROSURGICAL OUTPATIENT PROGRESS NOTE    DATE OF SERVICE:  2023    ATTENDING PHYSICIAN:  Mk George MD    NEHEMIAS:62%    NRS low back:5-10/10    NRS right le-10/10    NRS left le/10    Opioid use daily:NA    Neuropathic pain meds daily:gabapentin    SUBJECTIVE:    INTERIM HISTORY:  This is a 63 y.o. female who is s/p status post L5-S1 oblique interbody fusion with posterior instrumentation.  Patient complains of chronic low back pain and right leg pain.  The pain is constant but worse with activity such as walking, standing.  When she sits for long period of time the pain is also severe.  She has trouble driving.  The pain is felt like a pulling, burning sensation.  She does not have any pain on the left side.  Pain travels mainly towards the posterior thigh.  No foot pain.  Pain is associated with numbness.  Pain is affecting her ability to walk.  She has tried physical therapy but did not have any significant pain relief.  She had 2 right SI joint block and steroid injection that gave her significant pain relief and improve her ability to walk.  She has more than 75% pain relief for the duration of the block each time.      PAST MEDICAL HISTORY:  Active Ambulatory Problems     Diagnosis Date Noted    Hypertension associated with diabetes     Hyperlipidemia associated with type 2 diabetes mellitus     CAD (coronary artery disease)     Sleep apnea     Carotid stenosis, bilateral 10/24/2012    History of non-ST elevation myocardial infarction (NSTEMI) 2014    S/P coronary artery stent placement 2014    Nuclear sclerosis - Right Eye 2014    Primary localized osteoarthrosis, lower leg 2014    Chronic sinusitis 2015    PSC (posterior subcapsular cataract), right 2015    DME (diabetic macular edema) 2015    Refractive error 2015    Pseudophakia 2015    Type 2 diabetes mellitus with diabetic peripheral angiopathy without gangrene 2016    Diabetic  peripheral neuropathy associated with type 2 diabetes mellitus 02/24/2016    Cervical arthritis 08/29/2016    Neurogenic claudication 08/29/2016    Lumbar herniated disc 10/24/2016    Fatty liver disease, nonalcoholic 11/01/2017    Arthritis of lumbar spine 07/23/2018    Chronic pain 09/25/2018    Fusion of spine, lumbar region 02/06/2019    Lumbosacral radiculopathy at L5 04/12/2019    Type 2 diabetes mellitus with stage 2 chronic kidney disease and hypertension 04/20/2019    Asthma 04/20/2019    Normocytic anemia     Bradycardia 05/08/2019    Delayed surgical wound healing 05/13/2019    Rectal ulcer     Palliative care encounter 05/27/2019    Acute deep vein thrombosis (DVT) of femoral vein of right lower extremity     Impaired functional mobility and endurance 05/31/2019    (HFpEF) heart failure with preserved ejection fraction 06/06/2019    Alteration in skin integrity 06/07/2019    Debility 06/10/2019    Left ureteral injury 07/24/2019    Hydronephrosis 10/07/2019    Serum albumin decreased 11/01/2019    Weakness of both lower extremities 11/11/2019    Poor balance 11/11/2019    Type 2 diabetes mellitus with retinopathy, with long-term current use of insulin 01/09/2020    Hypertensive retinopathy, bilateral 01/09/2020    SI (sacroiliac) joint dysfunction 12/10/2020    S/P ureteral reimplantation 02/09/2021    Left flank pain 02/09/2021    Complex renal cyst 03/04/2021    Claudication of both lower extremities 05/04/2022    PAD (peripheral artery disease) 05/04/2022    Compression fracture of spine 08/10/2022    Nontraumatic compression fracture of T12 vertebra 08/10/2022    Bilateral renal cysts 08/10/2022    Chronic kidney disease, stage 3a 08/23/2022     Resolved Ambulatory Problems     Diagnosis Date Noted    Diabetes mellitus type II, uncontrolled 10/24/2012    Obesity 10/24/2012    Neck pain 03/07/2013    Non compliance with medical treatment 06/19/2013    PAPA (acute kidney injury) 02/17/2014    Coronary  stent restenosis 02/26/2014    Type II or unspecified type diabetes mellitus with other specified manifestations, uncontrolled 06/06/2014    Peripheral neuropathy 06/06/2014    Enthesopathy of hip region 06/18/2014    Type II or unspecified type diabetes mellitus with peripheral circulatory disorders, uncontrolled(250.72) 09/25/2014    Diabetes mellitus with peripheral artery disease 02/02/2015    Senile nuclear sclerosis 09/01/2015    Dysphagia 08/12/2016    Neck pain on right side 10/14/2016    Chronic bilateral low back pain with sciatica 10/14/2016    Decreased range of motion 10/14/2016    Decreased strength 10/14/2016    Decreased functional mobility 10/14/2016    Closed nondisplaced fracture of fifth metatarsal bone of right foot with routine healing 07/14/2017    Ureteral transection of left native kidney 04/13/2019    Ureteral stent retained 04/16/2019    Sepsis 04/20/2019    Encephalopathy 04/20/2019    Altered mental status     Encounter for weaning from ventilator     Metabolic acidosis     At risk for fluid volume overload 04/21/2019    On enteral nutrition 04/24/2019    Postoperative infection     Acute postoperative respiratory failure     Pulmonary edema     Dermatitis associated with moisture 05/06/2019    BRBPR (bright red blood per rectum) 05/07/2019    Urinary tract infection without hematuria     Status post insertion of percutaneous endoscopic gastrostomy (PEG) tube     Pain     Protein malnutrition     Acute on chronic respiratory failure with hypoxia 06/06/2019    Elevated troponin 06/06/2019    Fever 06/07/2019    Pneumonia of both lungs due to Pseudomonas species 06/09/2019    Acute combined systolic and diastolic heart failure 06/13/2019    Excessive carbohydrate intake 06/14/2019    Tracheostomy in place     Sepsis due to other specified Staphylococcus 06/05/2019    Chronic combined systolic and diastolic CHF (congestive heart failure) 07/02/2019    Nephrostomy status 07/11/2019     Nephrostomy tube displaced 2019    Weakness of both lower extremities 2019    Poor balance 2019    Gait, antalgic 2019    Intraoperative ureteral injury 2019     Past Medical History:   Diagnosis Date    Anticoagulant long-term use     Asthma     Back pain     Bradycardia, unspecified 2019    Carotid artery stenosis     Diabetes mellitus type 2 in obese     HTN (hypertension), benign     Hyperlipidemia     Keloid cicatrix     NSTEMI (non-ST elevated myocardial infarction)     Uncontrolled type 2 diabetes mellitus with both eyes affected by severe nonproliferative retinopathy and macular edema, with long-term current use of insulin 2020       PAST SURGICAL HISTORY:  Past Surgical History:   Procedure Laterality Date    ANTEGRADE NEPHROSTOGRAPHY Left 2019    Procedure: Nephrostogram - antegrade;  Surgeon: Robin Boyd MD;  Location: [address];  Service: Urology;  Laterality: Left;    BRONCHOSCOPY N/A 2019    Procedure: BRONCHOSCOPY;  Surgeon: Sean Ruano MD;  Location: [address];  Service: General;  Laterality: N/A;    CARDIAC CATHETERIZATION      CATARACT EXTRACTION      cataract extraction left eye      cataracts      CAUDAL EPIDURAL STEROID INJECTION N/A 2019    Procedure: Injection-steroid-epidural-caudal;  Surgeon: Dave Bentley Jr., MD;  Location: [facility] PAIN MGT;  Service: Pain Management;  Laterality: N/A;     SECTION, LOW TRANSVERSE      COLONOSCOPY N/A 2019    Procedure: COLONOSCOPY;  Surgeon: Al Alaniz MD;  Location: [facility] ([address] [place] [address]);  Service: Endoscopy;  Laterality: N/A;    CORONARY ANGIOPLASTY      CYSTOGRAM N/A 2019    Procedure: CYSTOGRAM INTRAOP;  Surgeon: Robin Boyd MD;  Location: [address];  Service: Urology;  Laterality: N/A;  1 HOUR    CYSTOSCOPY W/ RETROGRADES Left 2019    Procedure: CYSTOSCOPY, WITH RETROGRADE PYELOGRAM;  Surgeon: Robin Boyd MD;  Location: [address];   Service: Urology;  Laterality: Left;  fluro    ESOPHAGOGASTRODUODENOSCOPY W/ PEG  5/2/2019    Procedure: EGD, WITH PEG TUBE INSERTION;  Surgeon: Sean Ruano MD;  Location: [facility] OR 2ND FLR;  Service: General;;    EXCISION TURBINATE, SUBMUCOUS      FLEXIBLE SIGMOIDOSCOPY N/A 5/13/2019    Procedure: SIGMOIDOSCOPY, FLEXIBLE;  Surgeon: ALBERTO Amin MD;  Location: [facility] ENDO (2ND FLR);  Service: Endoscopy;  Laterality: N/A;    FLEXIBLE SIGMOIDOSCOPY N/A 5/21/2019    Procedure: SIGMOIDOSCOPY, FLEXIBLE;  Surgeon: ALBERTO Amin MD;  Location: [facility] ENDO (2ND FLR);  Service: Endoscopy;  Laterality: N/A;    FUSION OF LUMBAR SPINE BY ANTERIOR APPROACH Left 4/12/2019    Procedure: FUSION, SPINE, LUMBAR, ANTERIOR APPROACH Left L5-S1 Anterior to Psoa Interbody Fusion, L5-S1 Posterior Instrumentation;  Surgeon: Mk George MD;  Location: [facility] OR 2ND FLR;  Service: Neurosurgery;  Laterality: Left;  Porcedure:  Left L5-S1 Anterior to Psoa Interbody Fusion, L5-S1 Posterior Instrumentation  Surgery Time: 4 Hrs  LOS: 2-3  Anesthesia: General   Blood: Type & Screen  R    HAND SURGERY Left     HAND SURGERY Right     torn ligament repair/ Dr. Yeboah    HYSTERECTOMY      INJECTION OF STEROID Right 12/10/2020    Procedure: INJECTION, STEROID Right SI Joint Block and Steroid Injection;  Surgeon: Mk George MD;  Location: [facility];  Service: Neurosurgery;  Laterality: Right;  Procedure: Right SI Joint Block and Steroid Injection   SUrgery Time: 30 Min  LOS: 0  Anesthesia: MAC  Radiology: C-arm  Bed: [place] 4 Poster  Position: Prone    INJECTION OF STEROID Right 9/28/2021    Procedure: INJECTION, STEROID Right SI joint block & steroid injection;  Surgeon: Mk George MD;  Location: [facility] OR;  Service: Neurosurgery;  Laterality: Right;  Procedure: Right SI joint block & steroid injection  Surgery Time: 30m  Anesthesia: Local MAC  Radiology: C-arm  Bed: Regular  Position: Prone    left foot surgery      left wrist  surgery      LYSIS OF ADHESIONS N/A 2/19/2020    Procedure: LYSIS, ADHESIONS;  Surgeon: Robin Boyd MD;  [facility];  Service: Urology;  Laterality: N/A;    NASAL SEPTUM SURGERY  5/7/15    PERCUTANEOUS NEPHROSTOMY Left 4/21/2019    Procedure: Creation, Nephrostomy, Percutaneous;  Surgeon: Karina Surgeon;  Location: [facility];  Service: Anesthesiology;  Laterality: Left;    REPAIR OF URETER  4/12/2019    Procedure: REPAIR, URETER;  Surgeon: Mk George MD;  [facility];  Service: Neurosurgery;;    REPLACEMENT OF NEPHROSTOMY TUBE N/A 7/18/2019    Procedure: REPLACEMENT, NEPHROSTOMY TUBE;  Surgeon: [facility] Diagnostic Provider;  [address]  Service: Anesthesiology;  Laterality: N/A;  188    REPLACEMENT OF NEPHROSTOMY TUBE N/A 7/24/2019    Procedure: REPLACEMENT, NEPHROSTOMY TUBE;  Surgeon: [facility] Diagnostic Provider;  [facility];  Service: Anesthesiology;  Laterality: N/A;  188    REPLACEMENT OF NEPHROSTOMY TUBE N/A 10/7/2019    Procedure: REPLACEMENT, NEPHROSTOMY TUBE;  Surgeon: [facility] Diagnostic Provider;  [address]  Service: Anesthesiology;  Laterality: N/A;  189    REPLACEMENT OF NEPHROSTOMY TUBE N/A 11/25/2019    Procedure: REPLACEMENT, NEPHROSTOMY TUBE;  Surgeon: [facility] Diagnostic Provider;  [facility];  Service: Anesthesiology;  Laterality: N/A;  Room 188/Bessy    REPLACEMENT OF NEPHROSTOMY TUBE Right 2/19/2020    Procedure: REPLACEMENT, NEPHROSTOMY TUBE removal removal;  Surgeon: Robin Boyd MD;  Location: [address];  Service: Urology;  Laterality: Right;    rt elbow surgery      S/P LAD COATED STENT  05/14/2010    6 total     S/P OM1 STENT  08/2003    SINUS SURGERY      F.E.S.S.    TRACHEOSTOMY N/A 5/2/2019    Procedure: CREATION, TRACHEOSTOMY;  Surgeon: Sean Ruano MD;  Location: [address];  Service: General;  Laterality: N/A;    TUBAL LIGATION      URETERAL REIMPLANTION Left 2/19/2020    Procedure:  REIMPLANTATION, URETER WITH PSOAS HITCH;  Surgeon: Robin Boyd MD;  Location: [facility] OR [address] [place] [address];  Service: Urology;  Laterality: Left;       SOCIAL HISTORY:   Social History     Socioeconomic History    Marital status:      Spouse name: Shamir    Number of children: 2   Occupational History    Occupation: [employer]     Employer: [employer]     Employer: [employer]     Employer: [place] [email or web address]   Tobacco Use    Smoking status: Never    Smokeless tobacco: Never   Substance and Sexual Activity    Alcohol use: No     Alcohol/week: 0.0 standard drinks    Drug use: No    Sexual activity: Yes     Partners: Male     Birth control/protection: Post-menopausal     Comment:    Other Topics Concern    Are you pregnant or think you may be? No    Breast-feeding No   Social History Narrative    . 2 children.        FAMILY HISTORY:  Family History   Problem Relation Age of Onset    Diabetes Mother     Heart disease Mother     Diabetes Father     Leukemia Father         leukemia    Heart attack Father     Diabetes Sister     Diabetes Brother     Diabetes Sister     No Known Problems Sister     No Known Problems Brother     No Known Problems Brother     No Known Problems Maternal Grandmother     No Known Problems Maternal Grandfather     No Known Problems Paternal Grandmother     No Known Problems Paternal Grandfather     No Known Problems Son     No Known Problems Son     No Known Problems Maternal Aunt     No Known Problems Maternal Uncle     No Known Problems Paternal Aunt     No Known Problems Paternal Uncle     Colon cancer Neg Hx     Inflammatory bowel disease Neg Hx     Melanoma Neg Hx     Psoriasis Neg Hx     Lupus Neg Hx     Eczema Neg Hx     Acne Neg Hx     Amblyopia Neg Hx     Blindness Neg Hx     Cancer Neg Hx     Cataracts Neg Hx     Glaucoma Neg Hx     Hypertension Neg Hx     Macular degeneration Neg Hx     Retinal detachment Neg Hx     Strabismus Neg Hx     Stroke  Neg Hx     Thyroid disease Neg Hx     Heart failure Neg Hx     Hyperlipidemia Neg Hx        CURRENTS MEDICATIONS:  Current Outpatient Medications on File Prior to Visit   Medication Sig Dispense Refill    ACCU-CHEK EDIN PLUS METER Misc       albuterol (PROVENTIL/VENTOLIN HFA) 90 mcg/actuation inhaler Inhale 2 puffs into the lungs every 6 (six) hours as needed for Wheezing. 1 g 3    albuterol-ipratropium (DUO-NEB) 2.5 mg-0.5 mg/3 mL nebulizer solution Inhale 3 mLs into the lungs.      amLODIPine (NORVASC) 10 MG tablet Take 1 tablet (10 mg total) by mouth once daily. 90 tablet 2    apixaban (ELIQUIS) 2.5 mg Tab Take 1 tablet (2.5 mg total) by mouth 2 (two) times daily. 180 tablet 1    aspirin 81 mg Tab Take 81 mg by mouth. 1 Tablet Oral Every day      atorvastatin (LIPITOR) 40 MG tablet Take 1 tablet (40 mg total) by mouth every evening. 90 tablet 3    blood sugar diagnostic (ACCU-CHEK EDIN PLUS TEST STRP) Strp TEST BLOOD SUGARS 4 TIMES DAILY 150 strip 11    cilostazoL (PLETAL) 100 MG Tab Take 1 tablet (100 mg total) by mouth 2 (two) times daily. 60 tablet 11    dapagliflozin (FARXIGA) 10 mg tablet Take 1 tablet (10 mg total) by mouth once daily. 90 tablet 3    diclofenac sodium (VOLTAREN) 1 % Gel Apply 2 g topically 3 (three) times daily. Apply to the area of pain 2-3x per day or night as needed 100 g 3    dulaglutide (TRULICITY) 4.5 mg/0.5 mL pen injector Inject 4.5 mg into the skin every 7 days. 12 pen 3    EScitalopram oxalate (LEXAPRO) 10 MG tablet Take 1 tablet (10 mg total) by mouth once daily. 90 tablet 2    eszopiclone (LUNESTA) 2 MG Tab Take 1 tablet (2 mg total) by mouth every evening. 30 tablet 0    evolocumab (REPATHA SURECLICK) 140 mg/mL PnIj Inject 1 mL (140 mg total) into the skin every 14 (fourteen) days. 2 mL 6    gabapentin (NEURONTIN) 300 MG capsule Take 1 capsule (300 mg) in the morning and 2 capsules (600 mg) in the evening 90 capsule 0    glipiZIDE (GLUCOTROL) 10 MG TR24 Take 1 tablet (10 mg  "total) by mouth daily with breakfast. 90 tablet 3    hydroCHLOROthiazide (HYDRODIURIL) 12.5 MG Tab Take 1 tablet (12.5 mg total) by mouth once daily. 90 tablet 3    isosorbide mononitrate (ISMO,MONOKET) 10 mg tablet Take 1 tablet (10 mg total) by mouth once daily. 90 tablet 2    metoprolol tartrate (LOPRESSOR) 50 MG tablet TAKE 1 TABLET BY MOUTH TWICE A  tablet 1    multivitamin (THERAGRAN) per tablet Take 1 tablet by mouth once daily.      pen needle, diabetic (NOVOTWIST) 32 gauge x 1/5" Ndle Use 1 needle to inject insulin four times daily 400 each 2    potassium chloride SA (K-DUR,KLOR-CON) 20 MEQ tablet Take 1 tablet (20 mEq total) by mouth 2 (two) times daily. 60 tablet 11    promethazine (PHENERGAN) 12.5 MG Tab Take 1 tablet (12.5 mg total) by mouth every 8 (eight) hours as needed (nausea). 30 tablet 1    telmisartan (MICARDIS) 80 MG Tab Take 1 tablet (80 mg total) by mouth once daily. Stop losartan 90 tablet 2     Current Facility-Administered Medications on File Prior to Visit   Medication Dose Route Frequency Provider Last Rate Last Admin    0.9%  NaCl infusion   Intravenous Continuous Aime George  mL/hr at 10/27/22 0842 New Bag at 10/27/22 0842       ALLERGIES:  Review of patient's allergies indicates:   Allergen Reactions    Milk containing products      Causes GI distress       REVIEW OF SYSTEMS:  Review of Systems   Constitutional:  Negative for diaphoresis, fever and weight loss.   Respiratory:  Negative for shortness of breath.    Cardiovascular:  Negative for chest pain.   Gastrointestinal:  Negative for blood in stool.   Genitourinary:  Negative for hematuria.   Endo/Heme/Allergies:  Does not bruise/bleed easily.   All other systems reviewed and are negative.    OBJECTIVE:    PHYSICAL EXAMINATION:   Vitals:    05/03/23 0812   BP: 122/80   Pulse: 74       Physical Exam:  Vitals reviewed.    Constitutional: She appears well-developed and well-nourished.     Eyes: Pupils are equal, " round, and reactive to light. Conjunctivae and EOM are normal.     Cardiovascular: Normal distal pulses and no edema.     Abdominal: Soft.     Skin: Skin displays no rash on trunk and no rash on extremities. Skin displays no lesions on trunk and no lesions on extremities.     Psych/Behavior: She is alert. She is oriented to person, place, and time. She has a normal mood and affect.     Musculoskeletal:        Neck: Range of motion is full.     Neurological:        DTRs: Tricep reflexes are 2+ on the right side and 2+ on the left side. Bicep reflexes are 2+ on the right side and 2+ on the left side. Brachioradialis reflexes are 2+ on the right side and 2+ on the left side. Patellar reflexes are 2+ on the right side and 2+ on the left side. Achilles reflexes are 2+ on the right side and 2+ on the left side.     Back Exam     Tenderness   The patient is experiencing tenderness in the sacroiliac (Right side).    Range of Motion   Extension:  abnormal   Flexion:  abnormal   Lateral bend right:  abnormal   Lateral bend left:  abnormal   Rotation right:  abnormal   Rotation left:  abnormal     Muscle Strength   Right Quadriceps:  5/5   Left Quadriceps:  5/5   Right Hamstrings:  5/5   Left Hamstrings:  5/5     Tests   Straight leg raise right: negative  Straight leg raise left: negative    Other   Toe walk: normal  Heel walk: normal            Neurologic Exam     Mental Status   Oriented to person, place, and time.   Speech: speech is normal   Level of consciousness: alert    Cranial Nerves   Cranial nerves II through XII intact.     CN III, IV, VI   Pupils are equal, round, and reactive to light.  Extraocular motions are normal.     Motor Exam   Muscle bulk: normal  Overall muscle tone: normal    Strength   Right deltoid: 5/5  Left deltoid: 5/5  Right biceps: 5/5  Left biceps: 5/5  Right triceps: 5/5  Left triceps: 5/5  Right wrist flexion: 5/5  Left wrist flexion: 5/5  Right wrist extension: 5/5  Left wrist extension:  5/5  Right interossei: 5/5  Left interossei: 5/5  Right iliopsoas: 5/5  Left iliopsoas: 5/5  Right quadriceps: 5/5  Left quadriceps: 5/5  Right hamstrin/5  Left hamstrin/5  Right anterior tibial: 4/5  Left anterior tibial: 5/5  Right posterior tibial: 5/5  Left posterior tibial: 5/5  Right peroneal: 5/5  Left peroneal: 5/5  Right gastroc: 5/5  Left gastroc: 5/5    Sensory Exam   Light touch normal.   Pinprick normal.     Gait, Coordination, and Reflexes     Gait  Gait: normal    Coordination   Finger to nose coordination: normal  Tandem walking coordination: normal    Reflexes   Right brachioradialis: 2+  Left brachioradialis: 2+  Right biceps: 2+  Left biceps: 2+  Right triceps: 2+  Left triceps: 2+  Right patellar: 2+  Left patellar: 2+  Right achilles: 2+  Left achilles: 2+  Right plantar: normal  Left plantar: normal  Right Crump: absent  Left Crump: absent  Right ankle clonus: absent  Left ankle clonus: absent    Sacroiliac joint exam  Poor hip flexibility bilaterally, FORD test, Gaenslen test and thigh thrust test is positive on the right side, negative on the left side    DIAGNOSTIC DATA:  I personally interpreted the following imaging:   Lumbar spine MRI status post L5-S1 fusion, no significant adjacent segment degeneration  CTA of the abdomen and pelvis from , August shows consolidation of the L5-S1 fusion, significant bilateral SI joint degeneration, no significant hip degeneration.    ASSESMENT:  This is a 63 y.o. female with     Problem List Items Addressed This Visit          Neuro    Chronic pain       Orthopedic    SI (sacroiliac) joint dysfunction     Other Visit Diagnoses       S/P lumbar fusion    -  Primary    Failed back surgical syndrome                PLAN:  We will repeat right SI joint block and steroid injection as it has helped the patient significantly in the past.  Consider spinal cord stimulation trial for chronic right low back and leg pain.  The fusion appears  consolidated.  The patient has significant SI joint degeneration on CT of the pelvis in the past.  She does not have significant hip degeneration.  She is not interested in SI joint fusion procedure at this time.    More than 50% of the time was spent on discussing conservative management treatments (medication, physical therapy exercises) and possible interventions (spinal injections and surgical procedures). Care coordination was discussed.    30 minutes        Mk George MD  Cell:[phone]

## 2023-05-03 NOTE — PROGRESS NOTES
Oswestry Low Back Pain Disability Questionnaire    DATE OF SERVICE:  05/03/2023    ATTENDING PHYSICIAN:  Mk George MD    Instructions    This questionnaire has been designed to give us information as to how your back or leg pain is affecting your ability to manage in everyday life. Please answer by checking ONE box in each section for the statement which best applies to you. We realize you may consider that two or more statements in any one section apply but please just shade out the spot that indicates the statement which most clearly describes your problem.    Section 1 - Pain intensity    * I have no pain at the moment.  * The pain is very mild at the moment.  * The pain is moderate at the moment.  * The pain is fairly severe at the moment.  * The pain is very severe at the moment.  * The pain is the worst imaginable at the moment.    Score : 4    Section 2 - Personal care (washing, dressing etc)    * I can look after myself normally without causing extra pain.  * I can look after myself normally but it causes extra pain.  * It is painful to look after myself and I am slow and careful.  * I need some help but manage most of my personal care.  * I need help every day in most aspects of self-care.  * I do not get dressed, I wash with difficulty and stay in bed.    Score : 1    Section 3 - Lifting    * I can lift heavy weights without extra pain.  * I can lift heavy weights but it gives extra pain.  * Pain prevents me from lifting heavy weights off the floor, but I can manage if they are conveniently placed eg. on a table.  * Pain prevents me from lifting heavy weights, but I can manage light to medium weights if they are conveniently positioned.  * I can lift very light weights.  * I cannot lift or carry anything at all.    Score : 5    Section 4 - Walking    * Pain does not prevent me walking any distance.  * Pain prevents me from walking more than 1 mile.         * Pain prevents me from walking more than  1/2 mile.         * Pain prevents me from walking more than 100 yards.            * I can only walk using a stick or crutches.  * I am in bed most of the time.    Score : 5    Section 5 - Sitting    * I can sit in any chair as long as I like.  * I can only sit in my favourite chair as long as I like.  * Pain prevents me sitting more than one hour.  * Pain prevents me from sitting more than 30 minutes.  * Pain prevents me from sitting more than 10 minutes.  * Pain prevents me from sitting at all.    Score : 2    Section 6 - Standing    * I can stand as long as I want without extra pain.  * I can stand as long as I want but it gives me extra pain.  * Pain prevents me from standing for more than 1 hour  * Pain prevents me from standing for more than 30 minutes.  * Pain prevents me from standing for more than 10 minutes.  * Pain prevents me from standing at all.     Score : 2    Section 7 - Sleeping    * My sleep is never disturbed by pain.  * My sleep is occasionally disturbed by pain.  * Because of pain I have less than 6 hours sleep.   * Because of pain I have less than 4 hours sleep.   * Because of pain I have less than 2 hours sleep.  * Pain prevents me from sleeping at all.    Score : 3    Section 8 - Sex life (if applicable)    * My sex life is normal and causes no extra pain.  * My sex life is normal but causes some extra pain.  * My sex life is nearly normal but is very painful.   * My sex life is severely restricted by pain.  * My sex life is nearly absent because of pain.   * Pain prevents any sex life at all.    Score : 3    Section 9 - Social life    * My social life is normal and gives me no extra pain.  * My social life is normal but increases the degree of pain.  * Pain has no significant effect on my social life apart from limiting my more energetic interests eg, sport.  * Pain has restricted my social life and I do not go out as often.  * Pain has restricted my social life to my home.   * I have no  social life because of pain.     Score : 3    Section 10 - Travelling    * I can travel anywhere without pain.  * I can travel anywhere but it gives me extra pain.  * Pain is bad but I manage journeys over two hours.  * Pain restricts me to journeys of less than one hour.  * Pain restricts me to short necessary journeys under 30 minutes.  * Pain prevents me from travelling except to receive treatment.    Score : 3      TOTAL SCORE :  31 / 50 x 2 = 62 %      References  1. [place] TRAV, Ragini PB. The Oswestry Disability Index. Spine 2000 Nov 15;25(22):2941-52; discussion 52.  from standing for more than from standing for more than from standing for more than from standing at all

## 2023-05-06 NOTE — TELEPHONE ENCOUNTER
No care due was identified.  [facility] Embedded Care Due Messages. Reference number: [id number].   5/06/2023 10:10:56 AM CDT

## 2023-05-07 RX ORDER — ESZOPICLONE 2 MG/1
2 TABLET, FILM COATED ORAL NIGHTLY
Qty: 30 TABLET | Refills: 0 | Status: ON HOLD | OUTPATIENT
Start: 2023-05-07 | End: 2023-06-25 | Stop reason: HOSPADM

## 2023-05-16 ENCOUNTER — SPECIALTY PHARMACY (OUTPATIENT)
Dept: PHARMACY | Facility: CLINIC | Age: 63
End: 2023-05-16
Payer: COMMERCIAL

## 2023-05-16 NOTE — TELEPHONE ENCOUNTER
Specialty Pharmacy - Refill Coordination    Specialty Medication Orders Linked to Encounter      Flowsheet Row Most Recent Value   Medication #1 evolocumab (REPATHA SURECLICK) 140 mg/mL PnIj (Order#[id number], Rx#[id number])            Refill Questions - Documented Responses      Flowsheet Row Most Recent Value   Patient Availability and HIPAA Verification    Does patient want to proceed with activity? Yes   HIPAA/medical authority confirmed? Yes   Relationship to patient of person spoken to? Self   Refill Screening Questions    Would patient like to speak to a pharmacist? No   When does the patient need to receive the medication? 05/22/23   Refill Delivery Questions    How will the patient receive the medication? MED[facility]   When does the patient need to receive the medication? 05/22/23   Shipping Address Prescription   Address in [facility] confirmed and updated if neccessary? Yes   Expected Copay ($) 0   Is the patient able to afford the medication copay? Yes   Payment Method zero copay   Days supply of Refill 28   Supplies needed? No supplies needed   Refill activity completed? Yes   Refill activity plan Refill scheduled   Shipment/Pickup Date: 05/17/23            Current Outpatient Medications   Medication Sig    ACCU-CHEK EDIN PLUS METER Misc     albuterol (PROVENTIL/VENTOLIN HFA) 90 mcg/actuation inhaler Inhale 2 puffs into the lungs every 6 (six) hours as needed for Wheezing.    albuterol-ipratropium (DUO-NEB) 2.5 mg-0.5 mg/3 mL nebulizer solution Inhale 3 mLs into the lungs.    amLODIPine (NORVASC) 10 MG tablet Take 1 tablet (10 mg total) by mouth once daily.    apixaban (ELIQUIS) 2.5 mg Tab Take 1 tablet (2.5 mg total) by mouth 2 (two) times daily.    aspirin 81 mg Tab Take 81 mg by mouth. 1 Tablet Oral Every day    atorvastatin (LIPITOR) 40 MG tablet Take 1 tablet (40 mg total) by mouth every evening.    blood sugar diagnostic (ACCU-CHEK EDIN PLUS TEST STRP) Strp TEST BLOOD SUGARS 4 TIMES DAILY     "cilostazoL (PLETAL) 100 MG Tab Take 1 tablet (100 mg total) by mouth 2 (two) times daily.    dapagliflozin (FARXIGA) 10 mg tablet Take 1 tablet (10 mg total) by mouth once daily.    diclofenac sodium (VOLTAREN) 1 % Gel Apply 2 g topically 3 (three) times daily. Apply to the area of pain 2-3x per day or night as needed    dulaglutide (TRULICITY) 4.5 mg/0.5 mL pen injector Inject 4.5 mg into the skin every 7 days.    EScitalopram oxalate (LEXAPRO) 10 MG tablet Take 1 tablet (10 mg total) by mouth once daily.    eszopiclone (LUNESTA) 2 MG Tab Take 1 tablet (2 mg total) by mouth every evening.    evolocumab (REPATHA SURECLICK) 140 mg/mL PnIj Inject 1 mL (140 mg total) into the skin every 14 (fourteen) days.    gabapentin (NEURONTIN) 300 MG capsule Take 1 capsule (300 mg) in the morning and 2 capsules (600 mg) in the evening    glipiZIDE (GLUCOTROL) 10 MG TR24 Take 1 tablet (10 mg total) by mouth daily with breakfast.    hydroCHLOROthiazide (HYDRODIURIL) 12.5 MG Tab Take 1 tablet (12.5 mg total) by mouth once daily.    isosorbide mononitrate (ISMO,MONOKET) 10 mg tablet Take 1 tablet (10 mg total) by mouth once daily.    metoprolol tartrate (LOPRESSOR) 50 MG tablet TAKE 1 TABLET BY MOUTH TWICE A DAY    multivitamin (THERAGRAN) per tablet Take 1 tablet by mouth once daily.    pen needle, diabetic (NOVOTWIST) 32 gauge x 1/5" Ndle Use 1 needle to inject insulin four times daily    potassium chloride SA (K-DUR,KLOR-CON) 20 MEQ tablet Take 1 tablet (20 mEq total) by mouth 2 (two) times daily.    promethazine (PHENERGAN) 12.5 MG Tab Take 1 tablet (12.5 mg total) by mouth every 8 (eight) hours as needed (nausea).    telmisartan (MICARDIS) 80 MG Tab Take 1 tablet (80 mg total) by mouth once daily. Stop losartan   Last reviewed on 5/3/2023  8:13 AM by Rip Guthrie MA    Review of patient's allergies indicates:   Allergen Reactions    Milk containing products (dairy)      Causes GI distress    Last reviewed on  5/3/2023 8:13 AM " by Rip Guthrie      Tasks added this encounter   No tasks added.   Tasks due within next 3 months   No tasks due.     [facility]  [address]  [facility] [facility] [facility] [address]  Phone: [phone]  Fax: [phone]

## 2023-05-17 ENCOUNTER — PATIENT MESSAGE (OUTPATIENT)
Dept: PAIN MEDICINE | Facility: HOSPITAL | Age: 63
End: 2023-05-17
Payer: COMMERCIAL

## 2023-05-18 ENCOUNTER — TELEPHONE (OUTPATIENT)
Dept: NEUROSURGERY | Facility: CLINIC | Age: 63
End: 2023-05-18
Payer: COMMERCIAL

## 2023-05-18 NOTE — TELEPHONE ENCOUNTER
Contacted pt, pt stated that do not reschedule her yet because she usually has to pick her granddaughter around 230 pm but she is waiting to see if she has to pick her up. Pt stated if she does not have to pick her up then she will come to her injection today and if she does have to pick her up she would like to reschedule. Pt stated that she will send a message through the portal around 1230 to let us know. Pt voiced understanding

## 2023-05-25 ENCOUNTER — HOSPITAL ENCOUNTER (OUTPATIENT)
Facility: HOSPITAL | Age: 63
Discharge: HOME | End: 2023-05-26
Attending: EMERGENCY MEDICINE | Admitting: INTERNAL MEDICINE
Payer: COMMERCIAL

## 2023-05-25 DIAGNOSIS — K85.90 ACUTE PANCREATITIS WITHOUT INFECTION OR NECROSIS, UNSPECIFIED PANCREATITIS TYPE: Primary | ICD-10-CM

## 2023-05-25 DIAGNOSIS — R07.9 CHEST PAIN: ICD-10-CM

## 2023-05-25 DIAGNOSIS — I50.30 (HFPEF) HEART FAILURE WITH PRESERVED EJECTION FRACTION: ICD-10-CM

## 2023-05-25 LAB
ALBUMIN SERPL BCP-MCNC: 4.2 G/DL (ref 3.5–5.2)
ALP SERPL-CCNC: 127 U/L (ref 55–135)
ALT SERPL W/O P-5'-P-CCNC: 13 U/L (ref 10–44)
AMPHET+METHAMPHET UR QL: NEGATIVE
ANION GAP SERPL CALC-SCNC: 16 MMOL/L (ref 8–16)
AST SERPL-CCNC: 22 U/L (ref 10–40)
BACTERIA #/AREA URNS AUTO: ABNORMAL /HPF
BARBITURATES UR QL SCN>200 NG/ML: NEGATIVE
BASOPHILS # BLD AUTO: 0.05 K/UL (ref 0–0.2)
BASOPHILS NFR BLD: 0.5 % (ref 0–1.9)
BENZODIAZ UR QL SCN>200 NG/ML: NEGATIVE
BILIRUB DIRECT SERPL-MCNC: 0.5 MG/DL (ref 0.1–0.3)
BILIRUB SERPL-MCNC: 1.5 MG/DL (ref 0.1–1)
BILIRUB UR QL STRIP: NEGATIVE
BUN SERPL-MCNC: 21 MG/DL (ref 8–23)
BUN SERPL-MCNC: 23 MG/DL (ref 6–30)
BUN SERPL-MCNC: 30 MG/DL (ref 6–30)
BZE UR QL SCN: NEGATIVE
CALCIUM SERPL-MCNC: 10.8 MG/DL (ref 8.7–10.5)
CANNABINOIDS UR QL SCN: NEGATIVE
CHLORIDE SERPL-SCNC: 101 MMOL/L (ref 95–110)
CHLORIDE SERPL-SCNC: 102 MMOL/L (ref 95–110)
CHLORIDE SERPL-SCNC: 104 MMOL/L (ref 95–110)
CHOLEST SERPL-MCNC: 131 MG/DL (ref 120–199)
CHOLEST/HDLC SERPL: 2.9 {RATIO} (ref 2–5)
CLARITY UR REFRACT.AUTO: CLEAR
CO2 SERPL-SCNC: 22 MMOL/L (ref 23–29)
COLOR UR AUTO: YELLOW
CREAT SERPL-MCNC: 1.2 MG/DL (ref 0.5–1.4)
CREAT SERPL-MCNC: 1.2 MG/DL (ref 0.5–1.4)
CREAT SERPL-MCNC: 1.3 MG/DL (ref 0.5–1.4)
CREAT UR-MCNC: 77 MG/DL (ref 15–325)
DIFFERENTIAL METHOD: ABNORMAL
EOSINOPHIL # BLD AUTO: 0 K/UL (ref 0–0.5)
EOSINOPHIL NFR BLD: 0.3 % (ref 0–8)
ERYTHROCYTE [DISTWIDTH] IN BLOOD BY AUTOMATED COUNT: 13.2 % (ref 11.5–14.5)
EST. GFR  (NO RACE VARIABLE): 46.5 ML/MIN/1.73 M^2
ESTIMATED AVG GLUCOSE: 186 MG/DL (ref 68–131)
GLUCOSE SERPL-MCNC: 299 MG/DL (ref 70–110)
GLUCOSE SERPL-MCNC: 302 MG/DL (ref 70–110)
GLUCOSE SERPL-MCNC: 302 MG/DL (ref 70–110)
GLUCOSE UR QL STRIP: ABNORMAL
HBA1C MFR BLD: 8.1 % (ref 4–5.6)
HCT VFR BLD AUTO: 41.7 % (ref 37–48.5)
HCT VFR BLD CALC: 41 %PCV (ref 36–54)
HCT VFR BLD CALC: 44 %PCV (ref 36–54)
HDLC SERPL-MCNC: 45 MG/DL (ref 40–75)
HDLC SERPL: 34.4 % (ref 20–50)
HGB BLD-MCNC: 12.9 G/DL (ref 12–16)
HGB UR QL STRIP: NEGATIVE
IMM GRANULOCYTES # BLD AUTO: 0.04 K/UL (ref 0–0.04)
IMM GRANULOCYTES NFR BLD AUTO: 0.4 % (ref 0–0.5)
KETONES UR QL STRIP: ABNORMAL
LDLC SERPL CALC-MCNC: 53 MG/DL (ref 63–159)
LEUKOCYTE ESTERASE UR QL STRIP: ABNORMAL
LIPASE SERPL-CCNC: 2355 U/L (ref 4–60)
LYMPHOCYTES # BLD AUTO: 1.5 K/UL (ref 1–4.8)
LYMPHOCYTES NFR BLD: 14 % (ref 18–48)
MCH RBC QN AUTO: 26.4 PG (ref 27–31)
MCHC RBC AUTO-ENTMCNC: 30.9 G/DL (ref 32–36)
MCV RBC AUTO: 86 FL (ref 82–98)
METHADONE UR QL SCN>300 NG/ML: NEGATIVE
MICROSCOPIC COMMENT: ABNORMAL
MONOCYTES # BLD AUTO: 0.5 K/UL (ref 0.3–1)
MONOCYTES NFR BLD: 4.5 % (ref 4–15)
NEUTROPHILS # BLD AUTO: 8.6 K/UL (ref 1.8–7.7)
NEUTROPHILS NFR BLD: 80.3 % (ref 38–73)
NITRITE UR QL STRIP: NEGATIVE
NONHDLC SERPL-MCNC: 86 MG/DL
NRBC BLD-RTO: 0 /100 WBC
OPIATES UR QL SCN: NEGATIVE
PCP UR QL SCN>25 NG/ML: NEGATIVE
PH UR STRIP: 5 [PH] (ref 5–8)
PLATELET # BLD AUTO: 256 K/UL (ref 150–450)
PMV BLD AUTO: 11.6 FL (ref 9.2–12.9)
POC IONIZED CALCIUM: 1.09 MMOL/L (ref 1.06–1.42)
POC IONIZED CALCIUM: 1.27 MMOL/L (ref 1.06–1.42)
POC TCO2 (MEASURED): 25 MMOL/L (ref 23–29)
POC TCO2 (MEASURED): 30 MMOL/L (ref 23–29)
POCT GLUCOSE: 221 MG/DL (ref 70–110)
POTASSIUM BLD-SCNC: 4.6 MMOL/L (ref 3.5–5.1)
POTASSIUM BLD-SCNC: 5.9 MMOL/L (ref 3.5–5.1)
POTASSIUM SERPL-SCNC: 5.4 MMOL/L (ref 3.5–5.1)
PROT SERPL-MCNC: 9.1 G/DL (ref 6–8.4)
PROT UR QL STRIP: ABNORMAL
RBC # BLD AUTO: 4.88 M/UL (ref 4–5.4)
RBC #/AREA URNS AUTO: 0 /HPF (ref 0–4)
SAMPLE: ABNORMAL
SAMPLE: ABNORMAL
SODIUM BLD-SCNC: 139 MMOL/L (ref 136–145)
SODIUM BLD-SCNC: 140 MMOL/L (ref 136–145)
SODIUM SERPL-SCNC: 140 MMOL/L (ref 136–145)
SP GR UR STRIP: 1.03 (ref 1–1.03)
SQUAMOUS #/AREA URNS AUTO: 5 /HPF
TOXICOLOGY INFORMATION: NORMAL
TRIGL SERPL-MCNC: 165 MG/DL (ref 30–150)
URN SPEC COLLECT METH UR: ABNORMAL
WBC # BLD AUTO: 10.64 K/UL (ref 3.9–12.7)
WBC #/AREA URNS AUTO: 8 /HPF (ref 0–5)
YEAST UR QL AUTO: ABNORMAL

## 2023-05-25 PROCEDURE — 82330 ASSAY OF CALCIUM: CPT

## 2023-05-25 PROCEDURE — 83690 ASSAY OF LIPASE: CPT | Performed by: EMERGENCY MEDICINE

## 2023-05-25 PROCEDURE — 25500020 PHARM REV CODE 255: Performed by: EMERGENCY MEDICINE

## 2023-05-25 PROCEDURE — G0378 HOSPITAL OBSERVATION PER HR: HCPCS

## 2023-05-25 PROCEDURE — 81001 URINALYSIS AUTO W/SCOPE: CPT | Performed by: EMERGENCY MEDICINE

## 2023-05-25 PROCEDURE — 83036 HEMOGLOBIN GLYCOSYLATED A1C: CPT | Performed by: EMERGENCY MEDICINE

## 2023-05-25 PROCEDURE — 25000003 PHARM REV CODE 250: Performed by: INTERNAL MEDICINE

## 2023-05-25 PROCEDURE — 99285 EMERGENCY DEPT VISIT HI MDM: CPT | Mod: ,,, | Performed by: PHYSICIAN ASSISTANT

## 2023-05-25 PROCEDURE — 80047 BASIC METABLC PNL IONIZED CA: CPT

## 2023-05-25 PROCEDURE — 85025 COMPLETE CBC W/AUTO DIFF WBC: CPT | Performed by: EMERGENCY MEDICINE

## 2023-05-25 PROCEDURE — 80061 LIPID PANEL: CPT | Performed by: EMERGENCY MEDICINE

## 2023-05-25 PROCEDURE — 82248 BILIRUBIN DIRECT: CPT | Performed by: EMERGENCY MEDICINE

## 2023-05-25 PROCEDURE — 63600175 PHARM REV CODE 636 W HCPCS: Performed by: INTERNAL MEDICINE

## 2023-05-25 PROCEDURE — 96361 HYDRATE IV INFUSION ADD-ON: CPT

## 2023-05-25 PROCEDURE — 25000003 PHARM REV CODE 250: Performed by: PHYSICIAN ASSISTANT

## 2023-05-25 PROCEDURE — 80053 COMPREHEN METABOLIC PANEL: CPT | Performed by: EMERGENCY MEDICINE

## 2023-05-25 PROCEDURE — 99285 PR EMERGENCY DEPT VISIT,LEVEL V: ICD-10-PCS | Mod: ,,, | Performed by: PHYSICIAN ASSISTANT

## 2023-05-25 PROCEDURE — 99285 EMERGENCY DEPT VISIT HI MDM: CPT | Mod: 25

## 2023-05-25 PROCEDURE — 96360 HYDRATION IV INFUSION INIT: CPT | Mod: 59

## 2023-05-25 PROCEDURE — 25000003 PHARM REV CODE 250: Performed by: EMERGENCY MEDICINE

## 2023-05-25 PROCEDURE — 80307 DRUG TEST PRSMV CHEM ANLYZR: CPT | Performed by: EMERGENCY MEDICINE

## 2023-05-25 PROCEDURE — 96372 THER/PROPH/DIAG INJ SC/IM: CPT | Mod: 59 | Performed by: INTERNAL MEDICINE

## 2023-05-25 RX ORDER — HYDRALAZINE HYDROCHLORIDE 25 MG/1
25 TABLET, FILM COATED ORAL EVERY 6 HOURS PRN
Status: DISCONTINUED | OUTPATIENT
Start: 2023-05-25 | End: 2023-05-26 | Stop reason: HOSPADM

## 2023-05-25 RX ORDER — ONDANSETRON 2 MG/ML
4 INJECTION INTRAMUSCULAR; INTRAVENOUS EVERY 8 HOURS PRN
Status: DISCONTINUED | OUTPATIENT
Start: 2023-05-25 | End: 2023-05-26 | Stop reason: HOSPADM

## 2023-05-25 RX ORDER — LOSARTAN POTASSIUM 50 MG/1
100 TABLET ORAL DAILY
Status: DISCONTINUED | OUTPATIENT
Start: 2023-05-25 | End: 2023-05-26 | Stop reason: HOSPADM

## 2023-05-25 RX ORDER — ISOSORBIDE MONONITRATE 10 MG/1
10 TABLET ORAL DAILY
Status: DISCONTINUED | OUTPATIENT
Start: 2023-05-25 | End: 2023-05-25

## 2023-05-25 RX ORDER — DEXTROSE 40 %
30 GEL (GRAM) ORAL
Status: DISCONTINUED | OUTPATIENT
Start: 2023-05-25 | End: 2023-05-26 | Stop reason: HOSPADM

## 2023-05-25 RX ORDER — DEXTROSE 40 %
15 GEL (GRAM) ORAL
Status: DISCONTINUED | OUTPATIENT
Start: 2023-05-25 | End: 2023-05-26 | Stop reason: HOSPADM

## 2023-05-25 RX ORDER — SODIUM CHLORIDE 0.9 % (FLUSH) 0.9 %
10 SYRINGE (ML) INJECTION EVERY 12 HOURS PRN
Status: DISCONTINUED | OUTPATIENT
Start: 2023-05-25 | End: 2023-05-26 | Stop reason: HOSPADM

## 2023-05-25 RX ORDER — SODIUM,POTASSIUM PHOSPHATES 280-250MG
2 POWDER IN PACKET (EA) ORAL
Status: DISCONTINUED | OUTPATIENT
Start: 2023-05-25 | End: 2023-05-26 | Stop reason: HOSPADM

## 2023-05-25 RX ORDER — GLUCAGON 1 MG
1 KIT INJECTION
Status: DISCONTINUED | OUTPATIENT
Start: 2023-05-25 | End: 2023-05-26 | Stop reason: HOSPADM

## 2023-05-25 RX ORDER — TALC
6 POWDER (GRAM) TOPICAL NIGHTLY PRN
Status: DISCONTINUED | OUTPATIENT
Start: 2023-05-25 | End: 2023-05-26 | Stop reason: HOSPADM

## 2023-05-25 RX ORDER — INSULIN ASPART 100 [IU]/ML
1-10 INJECTION, SOLUTION INTRAVENOUS; SUBCUTANEOUS EVERY 6 HOURS PRN
Status: DISCONTINUED | OUTPATIENT
Start: 2023-05-25 | End: 2023-05-26 | Stop reason: HOSPADM

## 2023-05-25 RX ORDER — OXYCODONE HYDROCHLORIDE 10 MG/1
10 TABLET ORAL EVERY 6 HOURS PRN
Status: DISCONTINUED | OUTPATIENT
Start: 2023-05-25 | End: 2023-05-26 | Stop reason: HOSPADM

## 2023-05-25 RX ORDER — CILOSTAZOL 100 MG/1
100 TABLET ORAL 2 TIMES DAILY
Status: DISCONTINUED | OUTPATIENT
Start: 2023-05-25 | End: 2023-05-26 | Stop reason: HOSPADM

## 2023-05-25 RX ORDER — ZOLPIDEM TARTRATE 5 MG/1
5 TABLET ORAL NIGHTLY PRN
Status: DISCONTINUED | OUTPATIENT
Start: 2023-05-25 | End: 2023-05-26 | Stop reason: HOSPADM

## 2023-05-25 RX ORDER — SODIUM CHLORIDE, SODIUM LACTATE, POTASSIUM CHLORIDE, CALCIUM CHLORIDE 600; 310; 30; 20 MG/100ML; MG/100ML; MG/100ML; MG/100ML
INJECTION, SOLUTION INTRAVENOUS CONTINUOUS
Status: ACTIVE | OUTPATIENT
Start: 2023-05-25 | End: 2023-05-26

## 2023-05-25 RX ORDER — ESCITALOPRAM OXALATE 10 MG/1
10 TABLET ORAL DAILY
Status: DISCONTINUED | OUTPATIENT
Start: 2023-05-26 | End: 2023-05-26 | Stop reason: HOSPADM

## 2023-05-25 RX ORDER — AMLODIPINE BESYLATE 10 MG/1
10 TABLET ORAL DAILY
Status: DISCONTINUED | OUTPATIENT
Start: 2023-05-25 | End: 2023-05-26 | Stop reason: HOSPADM

## 2023-05-25 RX ORDER — ATORVASTATIN CALCIUM 20 MG/1
40 TABLET, FILM COATED ORAL NIGHTLY
Status: DISCONTINUED | OUTPATIENT
Start: 2023-05-25 | End: 2023-05-26 | Stop reason: HOSPADM

## 2023-05-25 RX ORDER — ONDANSETRON 4 MG/1
4 TABLET, ORALLY DISINTEGRATING ORAL
Status: COMPLETED | OUTPATIENT
Start: 2023-05-25 | End: 2023-05-25

## 2023-05-25 RX ORDER — OXYCODONE HYDROCHLORIDE 5 MG/1
5 TABLET ORAL EVERY 6 HOURS PRN
Status: DISCONTINUED | OUTPATIENT
Start: 2023-05-25 | End: 2023-05-26 | Stop reason: HOSPADM

## 2023-05-25 RX ORDER — GABAPENTIN 300 MG/1
600 CAPSULE ORAL NIGHTLY
Status: DISCONTINUED | OUTPATIENT
Start: 2023-05-25 | End: 2023-05-26 | Stop reason: HOSPADM

## 2023-05-25 RX ORDER — AMOXICILLIN 250 MG
1 CAPSULE ORAL 2 TIMES DAILY
Status: DISCONTINUED | OUTPATIENT
Start: 2023-05-25 | End: 2023-05-26 | Stop reason: HOSPADM

## 2023-05-25 RX ORDER — CLONIDINE HYDROCHLORIDE 0.1 MG/1
0.1 TABLET ORAL EVERY 6 HOURS PRN
Status: DISCONTINUED | OUTPATIENT
Start: 2023-05-25 | End: 2023-05-26 | Stop reason: HOSPADM

## 2023-05-25 RX ORDER — SODIUM CHLORIDE, SODIUM LACTATE, POTASSIUM CHLORIDE, CALCIUM CHLORIDE 600; 310; 30; 20 MG/100ML; MG/100ML; MG/100ML; MG/100ML
INJECTION, SOLUTION INTRAVENOUS CONTINUOUS
Status: DISCONTINUED | OUTPATIENT
Start: 2023-05-25 | End: 2023-05-25

## 2023-05-25 RX ORDER — ALBUTEROL SULFATE 90 UG/1
2 AEROSOL, METERED RESPIRATORY (INHALATION) EVERY 6 HOURS PRN
Status: DISCONTINUED | OUTPATIENT
Start: 2023-05-25 | End: 2023-05-26 | Stop reason: HOSPADM

## 2023-05-25 RX ORDER — INSULIN ASPART 100 [IU]/ML
0-5 INJECTION, SOLUTION INTRAVENOUS; SUBCUTANEOUS
Status: DISCONTINUED | OUTPATIENT
Start: 2023-05-25 | End: 2023-05-25

## 2023-05-25 RX ORDER — ACETAMINOPHEN 325 MG/1
650 TABLET ORAL EVERY 4 HOURS PRN
Status: DISCONTINUED | OUTPATIENT
Start: 2023-05-25 | End: 2023-05-26 | Stop reason: HOSPADM

## 2023-05-25 RX ORDER — NAPROXEN SODIUM 220 MG/1
81 TABLET, FILM COATED ORAL DAILY
Status: DISCONTINUED | OUTPATIENT
Start: 2023-05-26 | End: 2023-05-26 | Stop reason: HOSPADM

## 2023-05-25 RX ORDER — PROMETHAZINE HYDROCHLORIDE 12.5 MG/1
12.5 TABLET ORAL EVERY 8 HOURS PRN
Status: DISCONTINUED | OUTPATIENT
Start: 2023-05-25 | End: 2023-05-26 | Stop reason: HOSPADM

## 2023-05-25 RX ORDER — INSULIN ASPART 100 [IU]/ML
0-5 INJECTION, SOLUTION INTRAVENOUS; SUBCUTANEOUS EVERY 6 HOURS
Status: DISCONTINUED | OUTPATIENT
Start: 2023-05-25 | End: 2023-05-25

## 2023-05-25 RX ORDER — MAG HYDROX/ALUMINUM HYD/SIMETH 200-200-20
30 SUSPENSION, ORAL (FINAL DOSE FORM) ORAL 4 TIMES DAILY PRN
Status: DISCONTINUED | OUTPATIENT
Start: 2023-05-25 | End: 2023-05-26 | Stop reason: HOSPADM

## 2023-05-25 RX ORDER — ENOXAPARIN SODIUM 100 MG/ML
1 INJECTION SUBCUTANEOUS EVERY 12 HOURS
Status: DISCONTINUED | OUTPATIENT
Start: 2023-05-25 | End: 2023-05-26

## 2023-05-25 RX ORDER — GABAPENTIN 300 MG/1
300 CAPSULE ORAL DAILY
Status: DISCONTINUED | OUTPATIENT
Start: 2023-05-26 | End: 2023-05-26 | Stop reason: HOSPADM

## 2023-05-25 RX ORDER — LANOLIN ALCOHOL/MO/W.PET/CERES
800 CREAM (GRAM) TOPICAL
Status: DISCONTINUED | OUTPATIENT
Start: 2023-05-25 | End: 2023-05-26 | Stop reason: HOSPADM

## 2023-05-25 RX ORDER — METOPROLOL TARTRATE 50 MG/1
50 TABLET ORAL 2 TIMES DAILY
Status: DISCONTINUED | OUTPATIENT
Start: 2023-05-25 | End: 2023-05-26 | Stop reason: HOSPADM

## 2023-05-25 RX ORDER — ISOSORBIDE MONONITRATE 10 MG/1
10 TABLET ORAL DAILY
Status: DISCONTINUED | OUTPATIENT
Start: 2023-05-26 | End: 2023-05-26

## 2023-05-25 RX ORDER — NALOXONE HCL 0.4 MG/ML
0.02 VIAL (ML) INJECTION
Status: DISCONTINUED | OUTPATIENT
Start: 2023-05-25 | End: 2023-05-26 | Stop reason: HOSPADM

## 2023-05-25 RX ORDER — SIMETHICONE 80 MG
1 TABLET,CHEWABLE ORAL 4 TIMES DAILY PRN
Status: DISCONTINUED | OUTPATIENT
Start: 2023-05-25 | End: 2023-05-26 | Stop reason: HOSPADM

## 2023-05-25 RX ADMIN — AMLODIPINE BESYLATE 10 MG: 10 TABLET ORAL at 04:05

## 2023-05-25 RX ADMIN — ZOLPIDEM TARTRATE 5 MG: 5 TABLET ORAL at 08:05

## 2023-05-25 RX ADMIN — GABAPENTIN 600 MG: 300 CAPSULE ORAL at 08:05

## 2023-05-25 RX ADMIN — Medication 6 MG: at 08:05

## 2023-05-25 RX ADMIN — SODIUM CHLORIDE, POTASSIUM CHLORIDE, SODIUM LACTATE AND CALCIUM CHLORIDE: 600; 310; 30; 20 INJECTION, SOLUTION INTRAVENOUS at 05:05

## 2023-05-25 RX ADMIN — CILOSTAZOL 100 MG: 100 TABLET ORAL at 09:05

## 2023-05-25 RX ADMIN — IOHEXOL 75 ML: 350 INJECTION, SOLUTION INTRAVENOUS at 12:05

## 2023-05-25 RX ADMIN — METOPROLOL TARTRATE 50 MG: 50 TABLET, FILM COATED ORAL at 08:05

## 2023-05-25 RX ADMIN — SODIUM CHLORIDE 1000 ML: 9 INJECTION, SOLUTION INTRAVENOUS at 12:05

## 2023-05-25 RX ADMIN — LOSARTAN POTASSIUM 100 MG: 50 TABLET, FILM COATED ORAL at 04:05

## 2023-05-25 RX ADMIN — ATORVASTATIN CALCIUM 40 MG: 20 TABLET, FILM COATED ORAL at 08:05

## 2023-05-25 RX ADMIN — ONDANSETRON 4 MG: 4 TABLET, ORALLY DISINTEGRATING ORAL at 12:05

## 2023-05-25 RX ADMIN — INSULIN ASPART 2 UNITS: 100 INJECTION, SOLUTION INTRAVENOUS; SUBCUTANEOUS at 09:05

## 2023-05-25 RX ADMIN — SENNOSIDES AND DOCUSATE SODIUM 1 TABLET: 50; 8.6 TABLET ORAL at 08:05

## 2023-05-25 NOTE — ASSESSMENT & PLAN NOTE
TTE 9/2020   Normal left ventricular systolic function. The estimated ejection fraction is 55%.   Normal LV diastolic function.   Normal right ventricular systolic function.   The estimated PA systolic pressure is 17 mmHg.   Normal central venous pressure (3 mmHg).  Cont ASA and lopressor  Check TTE

## 2023-05-25 NOTE — HPI
63F with PMH of CAD s/p GINGER to ostial LAD in 2014, HTN, HLD, DM2, MURTAZA (not on CPAP), PAD presents with c/o abd pain and n/v x 3 days. Pt states she noticed abrupt onset of epigastric pain and n/v with no known inciting event. Denies fever, chills, sob, palpitations, diarrhea, constipation. No sick contacts. Workup in ED remarkable for K 5.4, CO2 22, Glu 302, Ca 10.8, BiliT 1.5, Lipase 2355, Utox negative, UA 4+ glu, trace protein, 1+ ketones, trace leuk, 8 wbc. CT abd/pelvis with no acute findings. No prior episodes of pancreatitis. Denies etoh use, but does take dapagliflozin and trulicity for the past 2-3 years and trulicity dose was recently increased a few months ago. Pt states her symptoms are improving and she now has appetite. Patient will be admitted to hospital medicine service for further management.

## 2023-05-25 NOTE — ED NOTES
I-STAT Chem-8+ Results:   Value Reference Range   Sodium 140 136-145 mmol/L   Potassium  5.9 3.5-5.1 mmol/L   Chloride 101  mmol/L   Ionized Calcium 1.27 1.06-1.42 mmol/L   CO2 (measured) 30 23-29 mmol/L   Glucose 299  mg/dL   BUN 30 6-30 mg/dL   Creatinine 1.2 0.5-1.4 mg/dL   Hematocrit 41 36-54%

## 2023-05-25 NOTE — ED NOTES
"Patient identifiers verified and correct for  Ms Huff  C/C:  Vomiting SEE NN  APPEARANCE: awake and alert in NAD. PAIN  4/10  SKIN: warm, dry and intact. No breakdown or bruising.  MUSCULOSKELETAL: Patient moving all extremities spontaneously, no obvious swelling or deformities noted. Ambulates independently.  RESPIRATORY: Denies shortness of breath.Respirations unlabored.   CARDIAC: Denies CP, 2+ distal pulses; no peripheral edema  ABDOMEN: ABdomen soft, reports " fullness" nausea.vomiting, denies diarhrea  : voids spontaneously, denies difficulty  Neurologic: AAO x 4; follows commands equal strength in all extremities; denies numbness/tingling. Denies dizziness  Ariel new weakness   "

## 2023-05-25 NOTE — SUBJECTIVE & OBJECTIVE
Past Medical History:   Diagnosis Date    Anticoagulant long-term use     Asthma     Back pain     Bradycardia, unspecified 2019    The etiology of the bradycardic episode is unclear.  The have appear to be respiratory in origin (at least the 1st episode).  We will continue to monitor carefully.  We are awaiting evaluation by Cardiology.      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, with long-term current use of insulin 2020       Past Surgical History:   Procedure Laterality Date    ANTEGRADE NEPHROSTOGRAPHY Left 2019    Procedure: Nephrostogram - antegrade;  Surgeon: Robin Boyd MD;  Location: [facility] OR [address] [place] [address];  Service: Urology;  Laterality: Left;    BRONCHOSCOPY N/A 2019    Procedure: BRONCHOSCOPY;  Surgeon: Sean Ruano MD;  Location: [facility] OR [address] [place] [address];  Service: General;  Laterality: N/A;    CARDIAC CATHETERIZATION      CATARACT EXTRACTION      cataract extraction left eye      cataracts      CAUDAL EPIDURAL STEROID INJECTION N/A 2019    Procedure: Injection-steroid-epidural-caudal;  Surgeon: Dave Bentley Jr., MD;  Location: [facility] PAIN MGT;  Service: Pain Management;  Laterality: N/A;     SECTION, LOW TRANSVERSE      COLONOSCOPY N/A 2019    Procedure: COLONOSCOPY;  Surgeon: Al Alaniz MD;  [facility]  Service: Endoscopy;  Laterality: N/A;    CORONARY ANGIOPLASTY      CYSTOGRAM N/A  12/11/2019    Procedure: CYSTOGRAM INTRAOP;  Surgeon: Robin Boyd MD;  [facility];  Service: Urology;  Laterality: N/A;  1 HOUR    CYSTOSCOPY W/ RETROGRADES Left 12/11/2019    Procedure: CYSTOSCOPY, WITH RETROGRADE PYELOGRAM;  Surgeon: Robin Boyd MD;  [facility];  Service: Urology;  Laterality: Left;  fluro    ESOPHAGOGASTRODUODENOSCOPY W/ PEG  5/2/2019    Procedure: EGD, WITH PEG TUBE INSERTION;  Surgeon: Sean Ruano MD;  [facility];  Service: General;;    EXCISION TURBINATE, SUBMUCOUS      FLEXIBLE SIGMOIDOSCOPY N/A 5/13/2019    Procedure: SIGMOIDOSCOPY, FLEXIBLE;  Surgeon: ALBERTO Amin MD;  Location: [facility] ENDO (2ND FLR);  Service: Endoscopy;  Laterality: N/A;    FLEXIBLE SIGMOIDOSCOPY N/A 5/21/2019    Procedure: SIGMOIDOSCOPY, FLEXIBLE;  Surgeon: ALBERTO Amin MD;  Location: [facility] ENDO ([facility] FLR);  Service: Endoscopy;  Laterality: N/A;    FUSION OF LUMBAR SPINE BY ANTERIOR APPROACH Left 4/12/2019    Procedure: FUSION, SPINE, LUMBAR, ANTERIOR APPROACH Left L5-S1 Anterior to Psoa Interbody Fusion, L5-S1 Posterior Instrumentation;  Surgeon: Mk George MD;  Location: [address];  Service: Neurosurgery;  Laterality: Left;  Porcedure:  Left L5-S1 Anterior to Psoa Interbody Fusion, L5-S1 Posterior Instrumentation  Surgery Time: 4 Hrs  LOS: 2-3  Anesthesia: General   Blood: Type & Screen  R    HAND SURGERY Left     HAND SURGERY Right     torn ligament repair/ Dr. Yeboah    HYSTERECTOMY      INJECTION OF STEROID Right 12/10/2020    Procedure: INJECTION, STEROID Right SI Joint Block and Steroid Injection;  Surgeon: Mk George MD;  Location: [facility];  Service: Neurosurgery;  Laterality: Right;  Procedure: Right SI Joint Block and Steroid Injection   SUrgery Time: 30 Min  LOS: 0  Anesthesia: MAC  Radiology: C-arm  Bed: [place] [id number] Poster  Position: Prone    INJECTION OF STEROID Right 9/28/2021    Procedure: INJECTION, STEROID Right SI joint  block & steroid injection;  Surgeon: Mk George MD;  Location: [facility] OR;  Service: Neurosurgery;  Laterality: Right;  Procedure: Right SI joint block & steroid injection  Surgery Time: 30m  Anesthesia: Local MAC  Radiology: C-arm  Bed: Regular  Position: Prone    left foot surgery      left wrist surgery      LYSIS OF ADHESIONS N/A 2/19/2020    Procedure: LYSIS, ADHESIONS;  Surgeon: Robin Boyd MD;  [facility];  Service: Urology;  Laterality: N/A;    NASAL SEPTUM SURGERY  5/7/15    PERCUTANEOUS NEPHROSTOMY Left 4/21/2019    Procedure: Creation, Nephrostomy, Percutaneous;  Surgeon: Karina Surgeon;  Location: [facility];  Service: Anesthesiology;  Laterality: Left;    REPAIR OF URETER  4/12/2019    Procedure: REPAIR, URETER;  Surgeon: Mk George MD;  [address];  Service: Neurosurgery;;    REPLACEMENT OF NEPHROSTOMY TUBE N/A 7/18/2019    Procedure: REPLACEMENT, NEPHROSTOMY TUBE;  Surgeon: [facility] Diagnostic Provider;  [address];  Service: Anesthesiology;  Laterality: N/A;  188    REPLACEMENT OF NEPHROSTOMY TUBE N/A 7/24/2019    Procedure: REPLACEMENT, NEPHROSTOMY TUBE;  Surgeon: [facility] Diagnostic Provider;  [facility];  Service: Anesthesiology;  Laterality: N/A;  188    REPLACEMENT OF NEPHROSTOMY TUBE N/A 10/7/2019    Procedure: REPLACEMENT, NEPHROSTOMY TUBE;  Surgeon: [facility] Diagnostic Provider;  [facility];  Service: Anesthesiology;  Laterality: N/A;  189    REPLACEMENT OF NEPHROSTOMY TUBE N/A 11/25/2019    Procedure: REPLACEMENT, NEPHROSTOMY TUBE;  Surgeon: [facility] Diagnostic Provider;  [facility];  Service: Anesthesiology;  Laterality: N/A;  Room 188/Bessy    REPLACEMENT OF NEPHROSTOMY TUBE Right 2/19/2020    Procedure: REPLACEMENT, NEPHROSTOMY TUBE removal removal;  Surgeon: Robin Boyd MD;  [facility];  Service: Urology;  Laterality: Right;    rt elbow surgery      S/P LAD COATED STENT  05/14/2010    6  total     S/P OM1 STENT  08/2003    SINUS SURGERY      F.E.S.S.    TRACHEOSTOMY N/A 5/2/2019    Procedure: CREATION, TRACHEOSTOMY;  Surgeon: Sean Ruano MD;  Location: [facility] OR [address] [place] [address];  Service: General;  Laterality: N/A;    TUBAL LIGATION      URETERAL REIMPLANTION Left 2/19/2020    Procedure: REIMPLANTATION, URETER WITH PSOAS HITCH;  Surgeon: Robin Boyd MD;  Location: [facility] OR [address] [place] [address];  Service: Urology;  Laterality: Left;       Review of patient's allergies indicates:   Allergen Reactions    Milk containing products (dairy)      Causes GI distress       Current Facility-Administered Medications on File Prior to Encounter   Medication    0.9%  NaCl infusion     Current Outpatient Medications on File Prior to Encounter   Medication Sig    amLODIPine (NORVASC) 10 MG tablet Take 1 tablet (10 mg total) by mouth once daily.    apixaban (ELIQUIS) 2.5 mg Tab Take 1 tablet (2.5 mg total) by mouth 2 (two) times daily.    atorvastatin (LIPITOR) 40 MG tablet Take 1 tablet (40 mg total) by mouth every evening.    cilostazoL (PLETAL) 100 MG Tab Take 1 tablet (100 mg total) by mouth 2 (two) times daily.    dapagliflozin (FARXIGA) 10 mg tablet Take 1 tablet (10 mg total) by mouth once daily.    dulaglutide (TRULICITY) 4.5 mg/0.5 mL pen injector Inject 4.5 mg into the skin every 7 days.    EScitalopram oxalate (LEXAPRO) 10 MG tablet Take 1 tablet (10 mg total) by mouth once daily.    glipiZIDE (GLUCOTROL) 10 MG TR24 Take 1 tablet (10 mg total) by mouth daily with breakfast.    hydroCHLOROthiazide (HYDRODIURIL) 12.5 MG Tab Take 1 tablet (12.5 mg total) by mouth once daily.    ACCU-CHEK EDIN PLUS METER Misc     albuterol (PROVENTIL/VENTOLIN HFA) 90 mcg/actuation inhaler Inhale 2 puffs into the lungs every 6 (six) hours as needed for Wheezing.    albuterol-ipratropium (DUO-NEB) 2.5 mg-0.5 mg/3 mL nebulizer solution Inhale 3 mLs into the lungs.    aspirin 81 mg Tab Take 81 mg by mouth. 1 Tablet Oral Every day    blood  "sugar diagnostic (ACCU-CHEK EDIN PLUS TEST STRP) Strp TEST BLOOD SUGARS 4 TIMES DAILY    diclofenac sodium (VOLTAREN) 1 % Gel Apply 2 g topically 3 (three) times daily. Apply to the area of pain 2-3x per day or night as needed    eszopiclone (LUNESTA) 2 MG Tab Take 1 tablet (2 mg total) by mouth every evening.    evolocumab (REPATHA SURECLICK) 140 mg/mL PnIj Inject 1 mL (140 mg total) into the skin every 14 (fourteen) days.    gabapentin (NEURONTIN) 300 MG capsule Take 1 capsule (300 mg) in the morning and 2 capsules (600 mg) in the evening    isosorbide mononitrate (ISMO,MONOKET) 10 mg tablet Take 1 tablet (10 mg total) by mouth once daily.    metoprolol tartrate (LOPRESSOR) 50 MG tablet TAKE 1 TABLET BY MOUTH TWICE A DAY    multivitamin (THERAGRAN) per tablet Take 1 tablet by mouth once daily.    pen needle, diabetic (NOVOTWIST) 32 gauge x 1/5" Ndle Use 1 needle to inject insulin four times daily    potassium chloride SA (K-DUR,KLOR-CON) 20 MEQ tablet Take 1 tablet (20 mEq total) by mouth 2 (two) times daily.    promethazine (PHENERGAN) 12.5 MG Tab Take 1 tablet (12.5 mg total) by mouth every 8 (eight) hours as needed (nausea).    telmisartan (MICARDIS) 80 MG Tab Take 1 tablet (80 mg total) by mouth once daily. Stop losartan     Family History       Problem Relation (Age of Onset)    Diabetes Mother, Father, Sister, Brother, Sister    Heart attack Father    Heart disease Mother    Leukemia Father    No Known Problems Sister, Brother, Brother, Maternal Grandmother, Maternal Grandfather, Paternal Grandmother, Paternal Grandfather, Son, Son, Maternal Aunt, Maternal Uncle, Paternal Aunt, Paternal Uncle          Tobacco Use    Smoking status: Never    Smokeless tobacco: Never   Substance and Sexual Activity    Alcohol use: No     Alcohol/week: 0.0 standard drinks    Drug use: No    Sexual activity: Yes     Partners: Male     Birth control/protection: Post-menopausal     Comment:      Review of Systems "   Constitutional:  Positive for activity change and appetite change. Negative for chills, diaphoresis, fatigue and fever.   HENT:  Negative for congestion, postnasal drip, rhinorrhea, sinus pressure, sinus pain and sneezing.    Respiratory:  Negative for cough, chest tightness, shortness of breath, wheezing and stridor.    Cardiovascular:  Negative for chest pain, palpitations and leg swelling.   Gastrointestinal:  Positive for abdominal pain, nausea and vomiting. Negative for abdominal distention, anal bleeding, blood in stool, constipation and diarrhea.   Endocrine: Negative for cold intolerance and heat intolerance.   Genitourinary:  Negative for dysuria, flank pain and hematuria.   Musculoskeletal:  Negative for gait problem.   Neurological:  Negative for dizziness and weakness.   Psychiatric/Behavioral:  Negative for agitation and behavioral problems.    Objective:     Vital Signs (Most Recent):  Temp: 97.7 °F (36.5 °C) (05/25/23 0947)  Pulse: 89 (05/25/23 1504)  Resp: 18 (05/25/23 1504)  BP: (!) 189/89 (05/25/23 1504)  SpO2: 99 % (05/25/23 1504) Vital Signs (24h Range):  Temp:  [97.7 °F (36.5 °C)] 97.7 °F (36.5 °C)  Pulse:  [86-89] 89  Resp:  [14-18] 18  SpO2:  [99 %-100 %] 99 %  BP: (142-189)/(78-89) 189/89     Weight: 69.9 kg (154 lb)  Body mass index is 26.43 kg/m².     Physical Exam  Constitutional:       General: She is not in acute distress.     Appearance: She is well-developed. She is not ill-appearing or toxic-appearing.   HENT:      Head: Normocephalic and atraumatic.   Eyes:      Pupils: Pupils are equal, round, and reactive to light.   Neck:      Vascular: No JVD.   Cardiovascular:      Rate and Rhythm: Normal rate and regular rhythm.      Heart sounds: Normal heart sounds. No murmur heard.    No friction rub. No gallop.   Pulmonary:      Effort: Pulmonary effort is normal. No respiratory distress.      Breath sounds: Normal breath sounds. No stridor. No wheezing or rales.   Abdominal:       General: Bowel sounds are normal. There is no distension.      Palpations: Abdomen is soft.      Tenderness: There is abdominal tenderness.   Musculoskeletal:         General: No tenderness. Normal range of motion.      Cervical back: Normal range of motion and neck supple.      Right lower leg: No edema.      Left lower leg: No edema.   Skin:     General: Skin is warm and dry.   Neurological:      General: No focal deficit present.      Mental Status: She is alert and oriented to person, place, and time. Mental status is at baseline.      Cranial Nerves: No cranial nerve deficit.   Psychiatric:         Behavior: Behavior normal.            CRANIAL NERVES     CN III, IV, VI   Pupils are equal, round, and reactive to light.     Significant Labs: All pertinent labs within the past 24 hours have been reviewed.    Significant Imaging: I have reviewed all pertinent imaging results/findings within the past 24 hours.

## 2023-05-25 NOTE — ED NOTES
I-STAT Chem-8+ Results:   Value Reference Range   Sodium 139 136-145 mmol/L   Potassium  4.6 3.5-5.1 mmol/L   Chloride 104  mmol/L   Ionized Calcium 1.09 1.06-1.42 mmol/L   CO2 (measured) 25 23-29 mmol/L   Glucose 302  mg/dL   BUN 23 6-30 mg/dL   Creatinine 1.2 0.5-1.4 mg/dL   Hematocrit 44 36-54%

## 2023-05-25 NOTE — ASSESSMENT & PLAN NOTE
Patient's FSGs are uncontrolled due to hyperglycemia on current medication regimen.  Last A1c reviewed-   Lab Results   Component Value Date    HGBA1C 8.2 (H) 04/05/2023     Most recent fingerstick glucose reviewed- No results for input(s): POCTGLUCOSE in the last 24 hours.  Current correctional scale  Medium  Maintain anti-hyperglycemic dose as follows-   Antihyperglycemics (From admission, onward)    Start     Stop Route Frequency Ordered    05/25/23 1753  insulin aspart U-100 pen 1-10 Units         -- SubQ Every 6 hours PRN 05/25/23 1653        Hold Oral hypoglycemics while patient is in the hospital.

## 2023-05-25 NOTE — ASSESSMENT & PLAN NOTE
63F presented with c/o abd pain, n/v and found to have lipase 2355 and bili 1.5  CT abd/pelvis with no acute findings, no signs of pancreatitis or biliary dilatation  Possibly drug induced pancreatitis 2/2 dapagliflozin vs trulicity, will hold these meds  Received 1L fluid bolus in ED  Judicious use of iv fluids due to hx of CHF. Check TTE  Lipid panel  US RUQ  Trend LFT. Check direct bili  Keep NPO, advance diet as tolerated

## 2023-05-25 NOTE — ED PROVIDER NOTES
Encounter Date: 5/25/2023       History     Chief Complaint   Patient presents with    Vomiting     Pt reports vomiting x 2 days.  Denies pan     63-year-old female with multiple medical comorbidities presents to the ED with vomiting.  Patient reports nausea and vomiting for the past 2 days.  She reports associated lower abdominal discomfort.  Last bowel movement was yesterday and was normal.  Denies fever.  Past surgical history notable for ureteral injury sustained during lumbar spinal fusion requiring revision; hysterectomy.     Review of patient's allergies indicates:   Allergen Reactions    Milk containing products (dairy)      Causes GI distress     Past Medical History:   Diagnosis Date    Anticoagulant long-term use     Asthma     Back pain     Bradycardia, unspecified 5/8/2019    The etiology of the bradycardic episode is unclear.  The have appear to be respiratory in origin (at least the 1st episode).  We will continue to monitor carefully.  We are awaiting evaluation by Cardiology.      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, with long-term current use of insulin 1/9/2020     Past Surgical History:   Procedure Laterality Date    ANTEGRADE NEPHROSTOGRAPHY Left 12/11/2019    Procedure: Nephrostogram - antegrade;  Surgeon: Robin Boyd MD;  [place] [facility] [place] [address] [place] [address];  Service: Urology;  Laterality: Left;    BRONCHOSCOPY N/A 5/2/2019    Procedure:  BRONCHOSCOPY;  Surgeon: Sean Ruano MD;  [address];  Service: General;  Laterality: N/A;    CARDIAC CATHETERIZATION      CATARACT EXTRACTION      cataract extraction left eye      cataracts      CAUDAL EPIDURAL STEROID INJECTION N/A 2019    Procedure: Injection-steroid-epidural-caudal;  Surgeon: Dave Bentley Jr., MD;  Location: [facility] PAIN MGT;  Service: Pain Management;  Laterality: N/A;     SECTION, LOW TRANSVERSE      COLONOSCOPY N/A 2019    Procedure: COLONOSCOPY;  Surgeon: Al Alaniz MD;  [facility]  Service: Endoscopy;  Laterality: N/A;    CORONARY ANGIOPLASTY      CYSTOGRAM N/A 2019    Procedure: CYSTOGRAM INTRAOP;  Surgeon: Robin Boyd MD;  [facility];  Service: Urology;  Laterality: N/A;  1 HOUR    CYSTOSCOPY W/ RETROGRADES Left 2019    Procedure: CYSTOSCOPY, WITH RETROGRADE PYELOGRAM;  Surgeon: Robin Boyd MD;  [address];  Service: Urology;  Laterality: Left;  fluro    ESOPHAGOGASTRODUODENOSCOPY W/ PEG  2019    Procedure: EGD, WITH PEG TUBE INSERTION;  Surgeon: Sean Ruano MD;  Location: [address];  Service: General;;    EXCISION TURBINATE, SUBMUCOUS      FLEXIBLE SIGMOIDOSCOPY N/A 2019    Procedure: SIGMOIDOSCOPY, FLEXIBLE;  Surgeon: ALBERTO Amin MD;  [facility]  Service: Endoscopy;  Laterality: N/A;    FLEXIBLE SIGMOIDOSCOPY N/A 2019    Procedure: SIGMOIDOSCOPY, FLEXIBLE;  Surgeon: ALBERTO Amin MD;  [facility]  Service: Endoscopy;  Laterality: N/A;    FUSION OF LUMBAR SPINE BY ANTERIOR APPROACH Left 2019    Procedure: FUSION, SPINE, LUMBAR, ANTERIOR APPROACH Left L5-S1 Anterior to Psoa Interbody Fusion, L5-S1 Posterior Instrumentation;  Surgeon: Mk George MD;  [facility];  Service: Neurosurgery;  Laterality: Left;  Porcedure:  Left L5-S1 Anterior to Psoa Interbody Fusion, L5-S1 Posterior  Instrumentation  Surgery Time: 4 Hrs  LOS: 2-3  Anesthesia: General   Blood: Type & Screen  R    HAND SURGERY Left     HAND SURGERY Right     torn ligament repair/ Dr. Yeboah    HYSTERECTOMY      INJECTION OF STEROID Right 12/10/2020    Procedure: INJECTION, STEROID Right SI Joint Block and Steroid Injection;  Surgeon: Mk George MD;  Location: [facility];  Service: Neurosurgery;  Laterality: Right;  Procedure: Right SI Joint Block and Steroid Injection   SUrgery Time: 30 Min  LOS: 0  Anesthesia: MAC  Radiology: C-arm  Bed: Tomer 4 Poster  Position: Prone    INJECTION OF STEROID Right 9/28/2021    Procedure: INJECTION, STEROID Right SI joint block & steroid injection;  Surgeon: Mk George MD;  Location: [facility] OR;  Service: Neurosurgery;  Laterality: Right;  Procedure: Right SI joint block & steroid injection  Surgery Time: 30m  Anesthesia: Local MAC  Radiology: C-arm  Bed: Regular  Position: Prone    left foot surgery      left wrist surgery      LYSIS OF ADHESIONS N/A 2/19/2020    Procedure: LYSIS, ADHESIONS;  Surgeon: Robin Boyd MD;  Location: [address];  Service: Urology;  Laterality: N/A;    NASAL SEPTUM SURGERY  5/7/15    PERCUTANEOUS NEPHROSTOMY Left 4/21/2019    Procedure: Creation, Nephrostomy, Percutaneous;  Surgeon: Karina Surgeon;  Location: [facility];  Service: Anesthesiology;  Laterality: Left;    REPAIR OF URETER  4/12/2019    Procedure: REPAIR, URETER;  Surgeon: Mk George MD;  Location: [address];  Service: Neurosurgery;;    REPLACEMENT OF NEPHROSTOMY TUBE N/A 7/18/2019    Procedure: REPLACEMENT, NEPHROSTOMY TUBE;  Surgeon: [facility];  Location: [address];  Service: Anesthesiology;  Laterality: N/A;  188    REPLACEMENT OF NEPHROSTOMY TUBE N/A 7/24/2019    Procedure: REPLACEMENT, NEPHROSTOMY TUBE;  Surgeon: [facility];  [address];  Service: Anesthesiology;  Laterality: N/A;  188    REPLACEMENT OF NEPHROSTOMY TUBE N/A  10/7/2019    Procedure: REPLACEMENT, NEPHROSTOMY TUBE;  Surgeon: [facility];  [facility];  Service: Anesthesiology;  Laterality: N/A;  189    REPLACEMENT OF NEPHROSTOMY TUBE N/A 11/25/2019    Procedure: REPLACEMENT, NEPHROSTOMY TUBE;  Surgeon: [facility];  [facility];  Service: Anesthesiology;  Laterality: N/A;  Room 188/Bessy    REPLACEMENT OF NEPHROSTOMY TUBE Right 2/19/2020    Procedure: REPLACEMENT, NEPHROSTOMY TUBE removal removal;  Surgeon: Robin Boyd MD;  [facility];  Service: Urology;  Laterality: Right;    rt elbow surgery      S/P LAD COATED STENT  05/14/2010    6 total     S/P OM1 STENT  08/2003    SINUS SURGERY      F.E.S.S.    TRACHEOSTOMY N/A 5/2/2019    Procedure: CREATION, TRACHEOSTOMY;  Surgeon: Sean Ruano MD;  Location: [address];  Service: General;  Laterality: N/A;    TUBAL LIGATION      URETERAL REIMPLANTION Left 2/19/2020    Procedure: REIMPLANTATION, URETER WITH PSOAS HITCH;  Surgeon: Robin Boyd MD;  Location: [address];  Service: Urology;  Laterality: Left;     Family History   Problem Relation Age of Onset    Diabetes Mother     Heart disease Mother     Diabetes Father     Leukemia Father         leukemia    Heart attack Father     Diabetes Sister     Diabetes Brother     Diabetes Sister     No Known Problems Sister     No Known Problems Brother     No Known Problems Brother     No Known Problems Maternal Grandmother     No Known Problems Maternal Grandfather     No Known Problems Paternal Grandmother     No Known Problems Paternal Grandfather     No Known Problems Son     No Known Problems Son     No Known Problems Maternal Aunt     No Known Problems Maternal Uncle     No Known Problems Paternal Aunt     No Known Problems Paternal Uncle     Colon cancer Neg Hx     Inflammatory bowel disease Neg Hx     Melanoma Neg Hx     Psoriasis Neg Hx     Lupus Neg Hx     Eczema Neg Hx     Acne Neg Hx      Amblyopia Neg Hx     Blindness Neg Hx     Cancer Neg Hx     Cataracts Neg Hx     Glaucoma Neg Hx     Hypertension Neg Hx     Macular degeneration Neg Hx     Retinal detachment Neg Hx     Strabismus Neg Hx     Stroke Neg Hx     Thyroid disease Neg Hx     Heart failure Neg Hx     Hyperlipidemia Neg Hx      Social History     Tobacco Use    Smoking status: Never    Smokeless tobacco: Never   Substance Use Topics    Alcohol use: No     Alcohol/week: 0.0 standard drinks    Drug use: No     Review of Systems   Constitutional:  Negative for fever.   Gastrointestinal:  Positive for abdominal pain, nausea and vomiting. Negative for diarrhea.     Physical Exam     Initial Vitals [05/25/23 0947]   BP Pulse Resp Temp SpO2   (!) 142/78 86 14 97.7 °F (36.5 °C) 100 %      MAP       --         Physical Exam    Nursing note and vitals reviewed.  Constitutional: She appears well-developed and well-nourished. She is not diaphoretic.  Non-toxic appearance. She does not appear ill. No distress.   HENT:   Head: Normocephalic and atraumatic.   Neck: Neck supple.   Cardiovascular:  Normal rate and regular rhythm.     Exam reveals no gallop and no friction rub.       No murmur heard.  Pulmonary/Chest: Effort normal and breath sounds normal. No accessory muscle usage. No tachypnea. No respiratory distress. She has no decreased breath sounds. She has no wheezes. She has no rhonchi. She has no rales.   Abdominal: Abdomen is soft. She exhibits no distension and no mass. There is generalized abdominal tenderness. There is no guarding.   Musculoskeletal:      Cervical back: Neck supple.     Neurological: She is alert.   Skin: No rash noted.   Psychiatric: She has a normal mood and affect. Her behavior is normal.       ED Course   Procedures  Labs Reviewed   CBC W/ AUTO DIFFERENTIAL - Abnormal; Notable for the following components:       Result Value    MCH 26.4 (*)     MCHC 30.9 (*)     Gran # (ANC) 8.6 (*)     Gran % 80.3 (*)     Lymph % 14.0  (*)     All other components within normal limits   COMPREHENSIVE METABOLIC PANEL - Abnormal; Notable for the following components:    Potassium 5.4 (*)     CO2 22 (*)     Glucose 302 (*)     Calcium 10.8 (*)     Total Protein 9.1 (*)     Total Bilirubin 1.5 (*)     eGFR 46.5 (*)     All other components within normal limits   LIPASE - Abnormal; Notable for the following components:    Lipase 2355 (*)     All other components within normal limits   URINALYSIS, REFLEX TO URINE CULTURE - Abnormal; Notable for the following components:    Protein, UA Trace (*)     Glucose, UA 4+ (*)     Ketones, UA 1+ (*)     Leukocytes, UA Trace (*)     All other components within normal limits    Narrative:     Specimen Source->Urine   URINALYSIS MICROSCOPIC - Abnormal; Notable for the following components:    WBC, UA 8 (*)     All other components within normal limits    Narrative:     Specimen Source->Urine   HEMOGLOBIN A1C - Abnormal; Notable for the following components:    Hemoglobin A1C 8.1 (*)     Estimated Avg Glucose 186 (*)     All other components within normal limits    Narrative:     add-on per Marilou Patel RN/ Gurwinder Mustafa MD; [id number]  LIPID / [id number] BILID ;  05/25/2023  17:02     Add-on GHGB to [id number] per Gurwinder Mustafa MD on  05/25/2023  16:48    BILIRUBIN, DIRECT - Abnormal; Notable for the following components:    Bilirubin, Direct 0.5 (*)     All other components within normal limits    Narrative:     add-on per Marilou Patel RN/ Gurwinder Mustafa MD; [id number]  LIPID / [id number] BILID ;  05/25/2023  17:02     Add-on GHGB to [id number] per Gurwinder Mustafa MD on  05/25/2023  16:48    LIPID PANEL - Abnormal; Notable for the following components:    Triglycerides 165 (*)     LDL Cholesterol 53.0 (*)     All other components within normal limits    Narrative:     add-on per Marilou Patel RN/ Gurwinder Mustafa MD; [id number]  LIPID / [id number] BILID ;  05/25/2023  17:02     Add-on GHGB to [id number] per  Gurwinder Mustafa MD on  05/25/2023  16:48    ISTAT PROCEDURE - Abnormal; Notable for the following components:    POC Glucose 299 (*)     POC Potassium 5.9 (*)     POC TCO2 (MEASURED) 30 (*)     All other components within normal limits   ISTAT PROCEDURE - Abnormal; Notable for the following components:    POC Glucose 302 (*)     All other components within normal limits   DRUG SCREEN PANEL, URINE EMERGENCY    Narrative:     Specimen Source->Urine   HEMOGLOBIN A1C   BILIRUBIN, DIRECT   LIPID PANEL   BETA - HYDROXYBUTYRATE, SERUM   POCT GLUCOSE, HAND-HELD DEVICE   ISTAT CHEM8   POCT GLUCOSE MONITORING CONTINUOUS          Imaging Results               US Abdomen Limited (Final result)  Result time 05/25/23 17:35:36      Final result by Chriss Holman MD (05/25/23 17:35:36)                   Impression:      This report was flagged in Epic as abnormal.    No findings to suggest acute cholecystitis.    Solid-appearing lesion within the interpolar region of the right kidney, correlates with lesion on CT 05/25/2023.  Further evaluation with nonemergent dedicated renal ultrasound is recommended as malignancy is not excluded.    Findings suggest hepatic steatosis, correlation with LFTs recommended.      Electronically signed by: Chriss Holman MD  Date:    05/25/2023  Time:    17:35               Narrative:    EXAMINATION:  US ABDOMEN LIMITED    CLINICAL HISTORY:  RUQ. elevated bili;    TECHNIQUE:  Limited ultrasound of the right upper quadrant of the abdomen (including pancreas, liver, gallbladder, common bile duct, and spleen) was performed.    COMPARISON:  Renal ultrasound 10/06/2022, CT abdomen and pelvis 05/25/2023.    FINDINGS:  The visualized portions of the pancreas are unremarkable.  The pancreatic duct is mildly prominent at the neck region although normal caliber near the tail.  The gallbladder is mildly distended without wall thickening or pericholecystic fluid.  No gallbladder wall hyperemia.  Sonographic  Robles sign is negative.  The common duct is not dilated measuring 0.2 cm.  The hepatic parenchyma is echogenic suggesting steatosis.  No intrahepatic biliary dilation.  There are subcentimeter cysts within the right kidney.  There is a heterogeneous appearing lesion arising from the interpolar region of the right kidney measuring 1.5 cm.                                       CT Abdomen Pelvis With Contrast (Final result)  Result time 05/25/23 13:36:59      Final result by Rashaun Friedman MD (05/25/23 13:36:59)                   Impression:      No evidence of bowel obstruction.    Indeterminate right renal 1.3 cm mass, stable.    Punctate nonobstructing right renal stone.    Extensive scattered calcific atherosclerotic disease.    Electronically signed by resident: Blayne Saleh  Date:    05/25/2023  Time:    13:03    Electronically signed by: Rashaun Friedman MD  Date:    05/25/2023  Time:    13:36               Narrative:    EXAMINATION:  CT ABDOMEN PELVIS WITH CONTRAST    CLINICAL HISTORY:  Bowel obstruction suspected;    TECHNIQUE:  The patient was surveyed from the lung bases through the pelvis after the administration of  cc Omni 350 IV contrast.  Oral contrast was administered. The data was reconstructed for coronal, sagittal, and axial images.    COMPARISON:  Ultrasound 10/06/2022, CT 08/03/2022, CT 02/12/2021    FINDINGS:  Lungs/Pleura: Lung bases are unremarkable.  No focal consolidation, pneumothorax, or pleural effusion is present.    Heart: The visualized portions of the heart are normal. No pericardial effusion.    Liver: The liver is normal in size and attenuation.  No focal hepatic abnormality.    Gallbladder/Bile ducts: The gallbladder is unremarkable.  No intrahepatic or extrahepatic biliary ductal dilatation.    Spleen:Unremarkable.    Stomach: Unremarkable.    Pancreas: Unremarkable.    Adrenals: Unremarkable.    Renal/Ureters: Stable appearance of heterogeneous solid focus within the right  midpole measuring 1.3 cm.  Redemonstration of multiple bilateral renal hypodensities, previously described as cysts on prior ultrasound.  Nonobstructive punctate stone in the right lower pole.  Postsurgical changes to the left ureter, unchanged from exams.  Persistent mild ureteral dilation.  The urinary bladder is unremarkable.    Reproductive: Uterus is absent.    Bowel: The visualized loops of small and large bowel show no evidence of obstruction or inflammation.    Peritoneum: no ascites, free fluid, or intraperitoneal free air.    Lymph Nodes: No pathologic anitha enlargement in the abdomen or pelvis.    Vasculature: The abdominal aorta is normal in course and caliber.  Severe atherosclerotic calcifications.    Bones: Age appropriate degenerative changes.  No acute fractures or osseous destructive lesions.    Soft Tissues: Ventral abdominal wall scarring.                                       Medications   albuterol inhaler 2 puff (has no administration in time range)   amLODIPine tablet 10 mg (10 mg Oral Given 5/25/23 1623)   aspirin chewable tablet 81 mg (has no administration in time range)   atorvastatin tablet 40 mg (40 mg Oral Given 5/25/23 2059)   cilostazoL tablet 100 mg (100 mg Oral Given 5/25/23 2108)   EScitalopram oxalate tablet 10 mg (has no administration in time range)   zolpidem tablet 5 mg (5 mg Oral Given 5/25/23 2059)   gabapentin capsule 300 mg (has no administration in time range)   gabapentin capsule 600 mg (600 mg Oral Given 5/25/23 2059)   metoprolol tartrate (LOPRESSOR) tablet 50 mg (50 mg Oral Given 5/25/23 2059)   multivitamin tablet (has no administration in time range)   promethazine tablet 12.5 mg (has no administration in time range)   losartan tablet 100 mg (100 mg Oral Given 5/25/23 1623)   sodium chloride 0.9% flush 10 mL (has no administration in time range)   melatonin tablet 6 mg (6 mg Oral Given 5/25/23 2059)   ondansetron injection 4 mg (has no administration in time range)    senna-docusate 8.6-50 mg per tablet 1 tablet (1 tablet Oral Given 5/25/23 2059)   simethicone chewable tablet 80 mg (has no administration in time range)   aluminum-magnesium hydroxide-simethicone 200-200-20 mg/5 mL suspension 30 mL (has no administration in time range)   acetaminophen tablet 650 mg (has no administration in time range)   naloxone 0.4 mg/mL injection 0.02 mg (has no administration in time range)   potassium bicarbonate disintegrating tablet 50 mEq (has no administration in time range)   potassium bicarbonate disintegrating tablet 35 mEq (has no administration in time range)   potassium bicarbonate disintegrating tablet 60 mEq (has no administration in time range)   magnesium oxide tablet 800 mg (has no administration in time range)   magnesium oxide tablet 800 mg (has no administration in time range)   potassium, sodium phosphates 280-160-250 mg packet 2 packet (has no administration in time range)   potassium, sodium phosphates 280-160-250 mg packet 2 packet (has no administration in time range)   glucagon (human recombinant) injection 1 mg (has no administration in time range)   dextrose 10% bolus 125 mL 125 mL (has no administration in time range)   dextrose 10% bolus 250 mL 250 mL (has no administration in time range)   dextrose 40 % gel 15,000 mg (has no administration in time range)   dextrose 40 % gel 30,000 mg (has no administration in time range)   oxyCODONE immediate release tablet 5 mg (has no administration in time range)   oxyCODONE immediate release tablet Tab 10 mg (has no administration in time range)   isosorbide mononitrate tablet 10 mg (has no administration in time range)   glucagon (human recombinant) injection 1 mg (has no administration in time range)   dextrose 10% bolus 125 mL 125 mL (has no administration in time range)   dextrose 10% bolus 250 mL 250 mL (has no administration in time range)   insulin aspart U-100 pen 1-10 Units (4 Units Subcutaneous Given 5/26/23 9725)    lactated ringers infusion ( Intravenous New Bag 5/25/23 1725)   cloNIDine tablet 0.1 mg (has no administration in time range)   hydrALAZINE tablet 25 mg (has no administration in time range)   enoxaparin injection 70 mg (has no administration in time range)   ondansetron disintegrating tablet 4 mg (4 mg Oral Given 5/25/23 1223)   sodium chloride 0.9% bolus 1,000 mL 1,000 mL (0 mLs Intravenous Stopped 5/25/23 1412)   iohexoL (OMNIPAQUE 350) injection 75 mL (75 mLs Intravenous Given 5/25/23 1258)     Medical Decision Making:   History:   Old Medical Records: I decided to obtain old medical records.  Initial Assessment:   63-year-old female presents to the ED with 2 days of nausea and vomiting with lower abdominal discomfort.  Nontoxic appearing.  Mild hypertension.  Vitals otherwise within normal limits.  She has moderate generalized abdominal tenderness.  Differential Diagnosis:   My differential diagnosis includes but is not limited to:  Dehydration, PAPA, electrolyte abnormality, gastritis, pancreatitis, UTI   Clinical Tests:   Lab Tests: Ordered  Radiological Study: Ordered  ED Management:  Labs notable for significantly elevated lipase at 2355.  Hyperbilirubinemia of 1.5.  Potassium slightly elevated at 5.4.  Hyperglycemia 302.  Patient was offered pain medication, but she preferred only antiemetics.  She reported improvement in her symptoms after IV Zofran.  Discussed diagnosis of pancreatitis with the patient and recommended admission for further fluid resuscitation, pain and nausea control.  Patient initially resistant, but ultimately agreed to admission.  She was placed in observation on Hospital Medicine service for further management.  I have reviewed the patient's records and discussed this case with my supervising physician.                ED Course as of 05/26/23 0715   Thu May 25, 2023   1429 Lipase(!): 2355 [EH]   1519 BILIRUBIN TOTAL(!): 1.5 [EH]      ED Course User Index  [EH] Sharon Quarles PA-C                  Clinical Impression:   Final diagnoses:  [K85.90] Acute pancreatitis without infection or necrosis, unspecified pancreatitis type (Primary)        ED Disposition Condition    Observation                 Sharon Quarles PA-C  05/26/23 0716

## 2023-05-25 NOTE — H&P
[facility] - Emergency Dept  Hospital Medicine  History & Physical    Patient Name: Mariann Huff  MRN: [id number]  Patient Class: OP- Observation  Admission Date: 5/25/2023  Attending Physician: Gurwinder Mustafa MD   Primary Care Provider: Jasbir Haney MD         Patient information was obtained from patient and ER records.     Subjective:     Principal Problem:Acute pancreatitis    Chief Complaint:   Chief Complaint   Patient presents with    Vomiting     Pt reports vomiting x 2 days.  Denies pan        HPI: 63F with PMH of CAD s/p GINGER to ostial LAD in 2014, HTN, HLD, DM2, MURTAZA (not on CPAP), PAD presents with c/o abd pain and n/v x 3 days. Pt states she noticed abrupt onset of epigastric pain and n/v with no known inciting event. Denies fever, chills, sob, palpitations, diarrhea, constipation. No sick contacts. Workup in ED remarkable for K 5.4, CO2 22, Glu 302, Ca 10.8, BiliT 1.5, Lipase 2355, Utox negative, UA 4+ glu, trace protein, 1+ ketones, trace leuk, 8 wbc. CT abd/pelvis with no acute findings. No prior episodes of pancreatitis. Denies etoh use, but does take dapagliflozin and trulicity for the past 2-3 years and trulicity dose was recently increased a few months ago. Pt states her symptoms are improving and she now has appetite. Patient will be admitted to hospital medicine service for further management.      Past Medical History:   Diagnosis Date    Anticoagulant long-term use     Asthma     Back pain     Bradycardia, unspecified 5/8/2019    The etiology of the bradycardic episode is unclear.  The have appear to be respiratory in origin (at least the 1st episode).  We will continue to monitor carefully.  We are awaiting evaluation by Cardiology.     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, with long-term current use of insulin 2020       Past Surgical History:   Procedure Laterality Date    ANTEGRADE NEPHROSTOGRAPHY Left 2019    Procedure: Nephrostogram - antegrade;  Surgeon: Robin Boyd MD;  [facility];  Service: Urology;  Laterality: Left;    BRONCHOSCOPY N/A 2019    Procedure: BRONCHOSCOPY;  Surgeon: Sean Ruano MD;  Location: [facility] OR [address] [place] [address];  Service: General;  Laterality: N/A;    CARDIAC CATHETERIZATION      CATARACT EXTRACTION      cataract extraction left eye      cataracts      CAUDAL EPIDURAL STEROID INJECTION N/A 2019    Procedure: Injection-steroid-epidural-caudal;  Surgeon: Dave Bentley Jr., MD;  Location: [facility] PAIN MGT;  Service: Pain Management;  Laterality: N/A;     SECTION, LOW TRANSVERSE      COLONOSCOPY N/A 2019    Procedure: COLONOSCOPY;  Surgeon: Al Alaniz MD;  Location: [facility] ENDO (2ND FLR);  Service: Endoscopy;  Laterality: N/A;    CORONARY ANGIOPLASTY      CYSTOGRAM N/A 2019    Procedure: CYSTOGRAM INTRAOP;  Surgeon: Robin Boyd MD;  [facility];  Service: Urology;  Laterality: N/A;  1 HOUR    CYSTOSCOPY W/ RETROGRADES Left 2019    Procedure: CYSTOSCOPY, WITH RETROGRADE PYELOGRAM;  Surgeon: Robin Boyd MD;  [address]  Service: Urology;  Laterality: Left;  fluro    ESOPHAGOGASTRODUODENOSCOPY W/ PEG  2019    Procedure: EGD, WITH PEG TUBE INSERTION;  Surgeon: Sean Ruano MD;  Location: [facility] OR [address] [place] [address];  Service: General;;    EXCISION TURBINATE, SUBMUCOUS      FLEXIBLE  SIGMOIDOSCOPY N/A 5/13/2019    Procedure: SIGMOIDOSCOPY, FLEXIBLE;  Surgeon: ALBERTO Amin MD;  Location: [facility] ENDO (2ND FLR);  Service: Endoscopy;  Laterality: N/A;    FLEXIBLE SIGMOIDOSCOPY N/A 5/21/2019    Procedure: SIGMOIDOSCOPY, FLEXIBLE;  Surgeon: ALBERTO Amin MD;  Location: [facility] ENDO (2ND FLR);  Service: Endoscopy;  Laterality: N/A;    FUSION OF LUMBAR SPINE BY ANTERIOR APPROACH Left 4/12/2019    Procedure: FUSION, SPINE, LUMBAR, ANTERIOR APPROACH Left L5-S1 Anterior to Psoa Interbody Fusion, L5-S1 Posterior Instrumentation;  Surgeon: Mk George MD;  [facility];  Service: Neurosurgery;  Laterality: Left;  Porcedure:  Left L5-S1 Anterior to Psoa Interbody Fusion, L5-S1 Posterior Instrumentation  Surgery Time: 4 Hrs  LOS: 2-3  Anesthesia: General   Blood: Type & Screen  R    HAND SURGERY Left     HAND SURGERY Right     torn ligament repair/ Dr. Yeboah    HYSTERECTOMY      INJECTION OF STEROID Right 12/10/2020    Procedure: INJECTION, STEROID Right SI Joint Block and Steroid Injection;  Surgeon: Mk George MD;  Location: [facility];  Service: Neurosurgery;  Laterality: Right;  Procedure: Right SI Joint Block and Steroid Injection   SUrgery Time: 30 Min  LOS: 0  Anesthesia: MAC  Radiology: C-arm  Bed: [place] 4 Poster  Position: Prone    INJECTION OF STEROID Right 9/28/2021    Procedure: INJECTION, STEROID Right SI joint block & steroid injection;  Surgeon: Mk George MD;  Location: [facility];  Service: Neurosurgery;  Laterality: Right;  Procedure: Right SI joint block & steroid injection  Surgery Time: 30m  Anesthesia: Local MAC  Radiology: C-arm  Bed: Regular  Position: Prone    left foot surgery      left wrist surgery      LYSIS OF ADHESIONS N/A 2/19/2020    Procedure: LYSIS, ADHESIONS;  Surgeon: Robin Boyd MD;  [facility];  Service: Urology;  Laterality: N/A;    NASAL SEPTUM SURGERY  5/7/15    PERCUTANEOUS NEPHROSTOMY Left 4/21/2019     Procedure: Creation, Nephrostomy, Percutaneous;  Surgeon: Karina Surgeon;  Location: [facility];  Service: Anesthesiology;  Laterality: Left;    REPAIR OF URETER  4/12/2019    Procedure: REPAIR, URETER;  Surgeon: Mk George MD;  Location: [address];  Service: Neurosurgery;;    REPLACEMENT OF NEPHROSTOMY TUBE N/A 7/18/2019    Procedure: REPLACEMENT, NEPHROSTOMY TUBE;  Surgeon: [facility] Diagnostic Provider;  [address]  Service: Anesthesiology;  Laterality: N/A;  188    REPLACEMENT OF NEPHROSTOMY TUBE N/A 7/24/2019    Procedure: REPLACEMENT, NEPHROSTOMY TUBE;  Surgeon: [facility] Diagnostic Provider;  [facility];  Service: Anesthesiology;  Laterality: N/A;  188    REPLACEMENT OF NEPHROSTOMY TUBE N/A 10/7/2019    Procedure: REPLACEMENT, NEPHROSTOMY TUBE;  Surgeon: [facility] Diagnostic Provider;  Location: [address];  Service: Anesthesiology;  Laterality: N/A;  189    REPLACEMENT OF NEPHROSTOMY TUBE N/A 11/25/2019    Procedure: REPLACEMENT, NEPHROSTOMY TUBE;  Surgeon: [facility] Diagnostic Provider;  [address]  Service: Anesthesiology;  Laterality: N/A;  Room 188/Bessy    REPLACEMENT OF NEPHROSTOMY TUBE Right 2/19/2020    Procedure: REPLACEMENT, NEPHROSTOMY TUBE removal removal;  Surgeon: Robin Boyd MD;  Location: [address];  Service: Urology;  Laterality: Right;    rt elbow surgery      S/P LAD COATED STENT  05/14/2010    6 total     S/P OM1 STENT  08/2003    SINUS SURGERY      F.E[place]S.S.    TRACHEOSTOMY N/A 5/2/2019    Procedure: CREATION, TRACHEOSTOMY;  Surgeon: Sean Ruano MD;  Location: [address];  Service: General;  Laterality: N/A;    TUBAL LIGATION      URETERAL REIMPLANTION Left 2/19/2020    Procedure: REIMPLANTATION, URETER WITH PSOAS HITCH;  Surgeon: Robin Boyd MD;  Location: [address];  Service: Urology;  Laterality: Left;       Review of patient's allergies indicates:   Allergen Reactions    Milk containing products (dairy)       Causes GI distress       Current Facility-Administered Medications on File Prior to Encounter   Medication    0.9%  NaCl infusion     Current Outpatient Medications on File Prior to Encounter   Medication Sig    amLODIPine (NORVASC) 10 MG tablet Take 1 tablet (10 mg total) by mouth once daily.    apixaban (ELIQUIS) 2.5 mg Tab Take 1 tablet (2.5 mg total) by mouth 2 (two) times daily.    atorvastatin (LIPITOR) 40 MG tablet Take 1 tablet (40 mg total) by mouth every evening.    cilostazoL (PLETAL) 100 MG Tab Take 1 tablet (100 mg total) by mouth 2 (two) times daily.    dapagliflozin (FARXIGA) 10 mg tablet Take 1 tablet (10 mg total) by mouth once daily.    dulaglutide (TRULICITY) 4.5 mg/0.5 mL pen injector Inject 4.5 mg into the skin every 7 days.    EScitalopram oxalate (LEXAPRO) 10 MG tablet Take 1 tablet (10 mg total) by mouth once daily.    glipiZIDE (GLUCOTROL) 10 MG TR24 Take 1 tablet (10 mg total) by mouth daily with breakfast.    hydroCHLOROthiazide (HYDRODIURIL) 12.5 MG Tab Take 1 tablet (12.5 mg total) by mouth once daily.    ACCU-CHEK EDIN PLUS METER Misc     albuterol (PROVENTIL/VENTOLIN HFA) 90 mcg/actuation inhaler Inhale 2 puffs into the lungs every 6 (six) hours as needed for Wheezing.    albuterol-ipratropium (DUO-NEB) 2.5 mg-0.5 mg/3 mL nebulizer solution Inhale 3 mLs into the lungs.    aspirin 81 mg Tab Take 81 mg by mouth. 1 Tablet Oral Every day    blood sugar diagnostic (ACCU-CHEK EDIN PLUS TEST STRP) Strp TEST BLOOD SUGARS 4 TIMES DAILY    diclofenac sodium (VOLTAREN) 1 % Gel Apply 2 g topically 3 (three) times daily. Apply to the area of pain 2-3x per day or night as needed    eszopiclone (LUNESTA) 2 MG Tab Take 1 tablet (2 mg total) by mouth every evening.    evolocumab (REPATHA SURECLICK) 140 mg/mL PnIj Inject 1 mL (140 mg total) into the skin every 14 (fourteen) days.    gabapentin (NEURONTIN) 300 MG capsule Take 1 capsule (300 mg) in the morning and 2 capsules (600  "mg) in the evening    isosorbide mononitrate (ISMO,MONOKET) 10 mg tablet Take 1 tablet (10 mg total) by mouth once daily.    metoprolol tartrate (LOPRESSOR) 50 MG tablet TAKE 1 TABLET BY MOUTH TWICE A DAY    multivitamin (THERAGRAN) per tablet Take 1 tablet by mouth once daily.    pen needle, diabetic (NOVOTWIST) 32 gauge x 1/5" Ndle Use 1 needle to inject insulin four times daily    potassium chloride SA (K-DUR,KLOR-CON) 20 MEQ tablet Take 1 tablet (20 mEq total) by mouth 2 (two) times daily.    promethazine (PHENERGAN) 12.5 MG Tab Take 1 tablet (12.5 mg total) by mouth every 8 (eight) hours as needed (nausea).    telmisartan (MICARDIS) 80 MG Tab Take 1 tablet (80 mg total) by mouth once daily. Stop losartan     Family History       Problem Relation (Age of Onset)    Diabetes Mother, Father, Sister, Brother, Sister    Heart attack Father    Heart disease Mother    Leukemia Father    No Known Problems Sister, Brother, Brother, Maternal Grandmother, Maternal Grandfather, Paternal Grandmother, Paternal Grandfather, Son, Son, Maternal Aunt, Maternal Uncle, Paternal Aunt, Paternal Uncle          Tobacco Use    Smoking status: Never    Smokeless tobacco: Never   Substance and Sexual Activity    Alcohol use: No     Alcohol/week: 0.0 standard drinks    Drug use: No    Sexual activity: Yes     Partners: Male     Birth control/protection: Post-menopausal     Comment:      Review of Systems   Constitutional:  Positive for activity change and appetite change. Negative for chills, diaphoresis, fatigue and fever.   HENT:  Negative for congestion, postnasal drip, rhinorrhea, sinus pressure, sinus pain and sneezing.    Respiratory:  Negative for cough, chest tightness, shortness of breath, wheezing and stridor.    Cardiovascular:  Negative for chest pain, palpitations and leg swelling.   Gastrointestinal:  Positive for abdominal pain, nausea and vomiting. Negative for abdominal distention, anal bleeding, blood " in stool, constipation and diarrhea.   Endocrine: Negative for cold intolerance and heat intolerance.   Genitourinary:  Negative for dysuria, flank pain and hematuria.   Musculoskeletal:  Negative for gait problem.   Neurological:  Negative for dizziness and weakness.   Psychiatric/Behavioral:  Negative for agitation and behavioral problems.    Objective:     Vital Signs (Most Recent):  Temp: 97.7 °F (36.5 °C) (05/25/23 0947)  Pulse: 89 (05/25/23 1504)  Resp: 18 (05/25/23 1504)  BP: (!) 189/89 (05/25/23 1504)  SpO2: 99 % (05/25/23 1504) Vital Signs (24h Range):  Temp:  [97.7 °F (36.5 °C)] 97.7 °F (36.5 °C)  Pulse:  [86-89] 89  Resp:  [14-18] 18  SpO2:  [99 %-100 %] 99 %  BP: (142-189)/(78-89) 189/89     Weight: 69.9 kg (154 lb)  Body mass index is 26.43 kg/m².     Physical Exam  Constitutional:       General: She is not in acute distress.     Appearance: She is well-developed. She is not ill-appearing or toxic-appearing.   HENT:      Head: Normocephalic and atraumatic.   Eyes:      Pupils: Pupils are equal, round, and reactive to light.   Neck:      Vascular: No JVD.   Cardiovascular:      Rate and Rhythm: Normal rate and regular rhythm.      Heart sounds: Normal heart sounds. No murmur heard.    No friction rub. No gallop.   Pulmonary:      Effort: Pulmonary effort is normal. No respiratory distress.      Breath sounds: Normal breath sounds. No stridor. No wheezing or rales.   Abdominal:      General: Bowel sounds are normal. There is no distension.      Palpations: Abdomen is soft.      Tenderness: There is abdominal tenderness.   Musculoskeletal:         General: No tenderness. Normal range of motion.      Cervical back: Normal range of motion and neck supple.      Right lower leg: No edema.      Left lower leg: No edema.   Skin:     General: Skin is warm and dry.   Neurological:      General: No focal deficit present.      Mental Status: She is alert and oriented to person, place, and time. Mental status is at  baseline.      Cranial Nerves: No cranial nerve deficit.   Psychiatric:         Behavior: Behavior normal.            CRANIAL NERVES     CN III, IV, VI   Pupils are equal, round, and reactive to light.     Significant Labs: All pertinent labs within the past 24 hours have been reviewed.    Significant Imaging: I have reviewed all pertinent imaging results/findings within the past 24 hours.    Assessment/Plan:     * Acute pancreatitis  63F presented with c/o abd pain, n/v and found to have lipase 2355 and bili 1.5  CT abd/pelvis with no acute findings, no signs of pancreatitis or biliary dilatation  Possibly drug induced pancreatitis 2/2 dapagliflozin vs trulicity, will hold these meds  Received 1L fluid bolus in ED  Judicious use of iv fluids due to hx of CHF. Check TTE  Lipid panel  US RUQ  Trend LFT. Check direct bili  Keep NPO, advance diet as tolerated      Chronic kidney disease, stage 3a  PAPA on CKD likely 2/2 IVVD  IV hydration  Avoid nephrotoxic meds  Monitor      PAD (peripheral artery disease)  Cont ASA, statin, cilostazol      (HFpEF) heart failure with preserved ejection fraction  TTE 9/2020   Normal left ventricular systolic function. The estimated ejection fraction is 55%.   Normal LV diastolic function.   Normal right ventricular systolic function.   The estimated PA systolic pressure is 17 mmHg.   Normal central venous pressure (3 mmHg).  Cont ASA and lopressor  Check TTE      Acute deep vein thrombosis (DVT) of femoral vein of right lower extremity  On eliquis  Start lovenox for now      Asthma  No signs of acute exacerbation  Albuterol PRN for sob/wheezing      Type 2 diabetes mellitus with stage 2 chronic kidney disease and hypertension  Patient's FSGs are uncontrolled due to hyperglycemia on current medication regimen.  Last A1c reviewed-   Lab Results   Component Value Date    HGBA1C 8.2 (H) 04/05/2023     Most recent fingerstick glucose reviewed- No results for input(s): POCTGLUCOSE in the  last 24 hours.  Current correctional scale  Medium  Maintain anti-hyperglycemic dose as follows-   Antihyperglycemics (From admission, onward)    Start     Stop Route Frequency Ordered    05/25/23 1753  insulin aspart U-100 pen 1-10 Units         -- SubQ Every 6 hours PRN 05/25/23 1653        Hold Oral hypoglycemics while patient is in the hospital.    Sleep apnea  Not on CPAP      CAD (coronary artery disease)  Cont ASA and statin      Hyperlipidemia associated with type 2 diabetes mellitus  Cont statin    Hypertension associated with diabetes  Amlodipine, Lopressor, Losartan       VTE Risk Mitigation (From admission, onward)         Ordered     enoxaparin injection 70 mg  Every 12 hours         05/25/23 1535     Reason for No Pharmacological VTE Prophylaxis  Once        Question:  Reasons:  Answer:  Already adequately anticoagulated on oral Anticoagulants    05/25/23 1535     IP VTE HIGH RISK PATIENT  Once         05/25/23 1535     Place sequential compression device  Until discontinued         05/25/23 1535                   On 05/25/2023, patient should be placed in hospital observation services under my care.        Gurwinder Mustafa MD  Department of Hospital Medicine  [facility] - Emergency Dept

## 2023-05-26 VITALS
DIASTOLIC BLOOD PRESSURE: 56 MMHG | TEMPERATURE: 98 F | BODY MASS INDEX: 26.63 KG/M2 | HEART RATE: 81 BPM | SYSTOLIC BLOOD PRESSURE: 106 MMHG | OXYGEN SATURATION: 93 % | RESPIRATION RATE: 18 BRPM | WEIGHT: 156 LBS | HEIGHT: 64 IN

## 2023-05-26 LAB
ALBUMIN SERPL BCP-MCNC: 3.4 G/DL (ref 3.5–5.2)
ALP SERPL-CCNC: 107 U/L (ref 55–135)
ALT SERPL W/O P-5'-P-CCNC: 11 U/L (ref 10–44)
ANION GAP SERPL CALC-SCNC: 12 MMOL/L (ref 8–16)
ASCENDING AORTA: 2.9 CM
AST SERPL-CCNC: 26 U/L (ref 10–40)
AV INDEX (PROSTH): 0.75
AV MEAN GRADIENT: 3 MMHG
AV PEAK GRADIENT: 5 MMHG
AV VALVE AREA: 2.4 CM2
AV VELOCITY RATIO: 0.82
BASOPHILS # BLD AUTO: 0.03 K/UL (ref 0–0.2)
BASOPHILS NFR BLD: 0.3 % (ref 0–1.9)
BILIRUB SERPL-MCNC: 1.1 MG/DL (ref 0.1–1)
BSA FOR ECHO PROCEDURE: 1.79 M2
BUN SERPL-MCNC: 21 MG/DL (ref 8–23)
CALCIUM SERPL-MCNC: 9.9 MG/DL (ref 8.7–10.5)
CHLORIDE SERPL-SCNC: 105 MMOL/L (ref 95–110)
CO2 SERPL-SCNC: 24 MMOL/L (ref 23–29)
CREAT SERPL-MCNC: 1.7 MG/DL (ref 0.5–1.4)
CV ECHO LV RWT: 0.33 CM
DIFFERENTIAL METHOD: ABNORMAL
DOP CALC AO PEAK VEL: 1.11 M/S
DOP CALC AO VTI: 24.73 CM
DOP CALC LVOT AREA: 3.2 CM2
DOP CALC LVOT DIAMETER: 2.02 CM
DOP CALC LVOT PEAK VEL: 0.91 M/S
DOP CALC LVOT STROKE VOLUME: 59.45 CM3
DOP CALCLVOT PEAK VEL VTI: 18.56 CM
E WAVE DECELERATION TIME: 163.28 MSEC
E/A RATIO: 0.89
E/E' RATIO: 10.15 M/S
ECHO LV POSTERIOR WALL: 0.86 CM (ref 0.6–1.1)
EJECTION FRACTION: 55 %
EOSINOPHIL # BLD AUTO: 0.1 K/UL (ref 0–0.5)
EOSINOPHIL NFR BLD: 0.9 % (ref 0–8)
ERYTHROCYTE [DISTWIDTH] IN BLOOD BY AUTOMATED COUNT: 13.2 % (ref 11.5–14.5)
EST. GFR  (NO RACE VARIABLE): 33.7 ML/MIN/1.73 M^2
FRACTIONAL SHORTENING: 30 % (ref 28–44)
GLUCOSE SERPL-MCNC: 238 MG/DL (ref 70–110)
HCT VFR BLD AUTO: 36.3 % (ref 37–48.5)
HGB BLD-MCNC: 10.8 G/DL (ref 12–16)
IMM GRANULOCYTES # BLD AUTO: 0.03 K/UL (ref 0–0.04)
IMM GRANULOCYTES NFR BLD AUTO: 0.3 % (ref 0–0.5)
INTERVENTRICULAR SEPTUM: 0.89 CM (ref 0.6–1.1)
LA MAJOR: 4.75 CM
LA MINOR: 4.79 CM
LA WIDTH: 3.64 CM
LEFT ATRIUM SIZE: 3.85 CM
LEFT ATRIUM VOLUME INDEX MOD: 26.3 ML/M2
LEFT ATRIUM VOLUME INDEX: 32.3 ML/M2
LEFT ATRIUM VOLUME MOD: 46.3 CM3
LEFT ATRIUM VOLUME: 56.82 CM3
LEFT INTERNAL DIMENSION IN SYSTOLE: 3.64 CM (ref 2.1–4)
LEFT VENTRICLE DIASTOLIC VOLUME INDEX: 72.76 ML/M2
LEFT VENTRICLE DIASTOLIC VOLUME: 128.05 ML
LEFT VENTRICLE MASS INDEX: 92 G/M2
LEFT VENTRICLE SYSTOLIC VOLUME INDEX: 31.8 ML/M2
LEFT VENTRICLE SYSTOLIC VOLUME: 56.03 ML
LEFT VENTRICULAR INTERNAL DIMENSION IN DIASTOLE: 5.17 CM (ref 3.5–6)
LEFT VENTRICULAR MASS: 161.33 G
LIPASE SERPL-CCNC: 2107 U/L (ref 4–60)
LV LATERAL E/E' RATIO: 9.43 M/S
LV SEPTAL E/E' RATIO: 11 M/S
LYMPHOCYTES # BLD AUTO: 2.2 K/UL (ref 1–4.8)
LYMPHOCYTES NFR BLD: 19.3 % (ref 18–48)
MAGNESIUM SERPL-MCNC: 2.7 MG/DL (ref 1.6–2.6)
MCH RBC QN AUTO: 26.3 PG (ref 27–31)
MCHC RBC AUTO-ENTMCNC: 29.8 G/DL (ref 32–36)
MCV RBC AUTO: 89 FL (ref 82–98)
MONOCYTES # BLD AUTO: 1.2 K/UL (ref 0.3–1)
MONOCYTES NFR BLD: 10.2 % (ref 4–15)
MV PEAK A VEL: 0.74 M/S
MV PEAK E VEL: 0.66 M/S
MV STENOSIS PRESSURE HALF TIME: 47.35 MS
MV VALVE AREA P 1/2 METHOD: 4.65 CM2
NEUTROPHILS # BLD AUTO: 7.8 K/UL (ref 1.8–7.7)
NEUTROPHILS NFR BLD: 69 % (ref 38–73)
NRBC BLD-RTO: 0 /100 WBC
PLATELET # BLD AUTO: 232 K/UL (ref 150–450)
PMV BLD AUTO: 11.7 FL (ref 9.2–12.9)
POCT GLUCOSE: 242 MG/DL (ref 70–110)
POCT GLUCOSE: 297 MG/DL (ref 70–110)
POCT GLUCOSE: 309 MG/DL (ref 70–110)
POTASSIUM SERPL-SCNC: 4.5 MMOL/L (ref 3.5–5.1)
PROT SERPL-MCNC: 7.3 G/DL (ref 6–8.4)
QEF: 52 %
RA MAJOR: 3.89 CM
RA PRESSURE: 3 MMHG
RA WIDTH: 2.66 CM
RBC # BLD AUTO: 4.1 M/UL (ref 4–5.4)
RIGHT VENTRICULAR END-DIASTOLIC DIMENSION: 2.87 CM
SINUS: 2.8 CM
SODIUM SERPL-SCNC: 141 MMOL/L (ref 136–145)
STJ: 2.38 CM
TDI LATERAL: 0.07 M/S
TDI SEPTAL: 0.06 M/S
TDI: 0.07 M/S
TRICUSPID ANNULAR PLANE SYSTOLIC EXCURSION: 1.61 CM
WBC # BLD AUTO: 11.32 K/UL (ref 3.9–12.7)

## 2023-05-26 PROCEDURE — 83735 ASSAY OF MAGNESIUM: CPT | Performed by: INTERNAL MEDICINE

## 2023-05-26 PROCEDURE — 83690 ASSAY OF LIPASE: CPT | Performed by: INTERNAL MEDICINE

## 2023-05-26 PROCEDURE — 96372 THER/PROPH/DIAG INJ SC/IM: CPT | Performed by: INTERNAL MEDICINE

## 2023-05-26 PROCEDURE — 36415 COLL VENOUS BLD VENIPUNCTURE: CPT | Performed by: INTERNAL MEDICINE

## 2023-05-26 PROCEDURE — 99233 PR SUBSEQUENT HOSPITAL CARE,LEVL III: ICD-10-PCS | Mod: GT,,, | Performed by: STUDENT IN AN ORGANIZED HEALTH CARE EDUCATION/TRAINING PROGRAM

## 2023-05-26 PROCEDURE — 80053 COMPREHEN METABOLIC PANEL: CPT | Performed by: INTERNAL MEDICINE

## 2023-05-26 PROCEDURE — 99233 SBSQ HOSP IP/OBS HIGH 50: CPT | Mod: GT,,, | Performed by: STUDENT IN AN ORGANIZED HEALTH CARE EDUCATION/TRAINING PROGRAM

## 2023-05-26 PROCEDURE — 63600175 PHARM REV CODE 636 W HCPCS: Performed by: INTERNAL MEDICINE

## 2023-05-26 PROCEDURE — G0378 HOSPITAL OBSERVATION PER HR: HCPCS

## 2023-05-26 PROCEDURE — 25000003 PHARM REV CODE 250: Performed by: INTERNAL MEDICINE

## 2023-05-26 PROCEDURE — 85025 COMPLETE CBC W/AUTO DIFF WBC: CPT | Performed by: INTERNAL MEDICINE

## 2023-05-26 RX ORDER — ENOXAPARIN SODIUM 100 MG/ML
1 INJECTION SUBCUTANEOUS EVERY 12 HOURS
Status: DISCONTINUED | OUTPATIENT
Start: 2023-05-26 | End: 2023-05-26 | Stop reason: HOSPADM

## 2023-05-26 RX ADMIN — ASPIRIN 81 MG 81 MG: 81 TABLET ORAL at 08:05

## 2023-05-26 RX ADMIN — THERA TABS 1 TABLET: TAB at 08:05

## 2023-05-26 RX ADMIN — GABAPENTIN 300 MG: 300 CAPSULE ORAL at 08:05

## 2023-05-26 RX ADMIN — ESCITALOPRAM OXALATE 10 MG: 10 TABLET ORAL at 08:05

## 2023-05-26 RX ADMIN — ENOXAPARIN SODIUM 70 MG: 100 INJECTION SUBCUTANEOUS at 10:05

## 2023-05-26 RX ADMIN — AMLODIPINE BESYLATE 10 MG: 10 TABLET ORAL at 08:05

## 2023-05-26 RX ADMIN — CILOSTAZOL 100 MG: 100 TABLET ORAL at 08:05

## 2023-05-26 RX ADMIN — LOSARTAN POTASSIUM 100 MG: 50 TABLET, FILM COATED ORAL at 08:05

## 2023-05-26 RX ADMIN — SENNOSIDES AND DOCUSATE SODIUM 1 TABLET: 50; 8.6 TABLET ORAL at 08:05

## 2023-05-26 RX ADMIN — METOPROLOL TARTRATE 50 MG: 50 TABLET, FILM COATED ORAL at 08:05

## 2023-05-26 RX ADMIN — INSULIN ASPART 4 UNITS: 100 INJECTION, SOLUTION INTRAVENOUS; SUBCUTANEOUS at 05:05

## 2023-05-26 RX ADMIN — INSULIN ASPART 3 UNITS: 100 INJECTION, SOLUTION INTRAVENOUS; SUBCUTANEOUS at 12:05

## 2023-05-26 RX ADMIN — INSULIN ASPART 8 UNITS: 100 INJECTION, SOLUTION INTRAVENOUS; SUBCUTANEOUS at 12:05

## 2023-05-26 NOTE — NURSING
Patient discharged home with all belongings. Discharge instructions reviewed with patient- verbalized understanding.

## 2023-05-26 NOTE — ASSESSMENT & PLAN NOTE
Bilateral renal cysts noted in 2022   Abdominal ultrasound this admission notes an incidental renal mass on R kidney but CT notes stable appearance

## 2023-05-26 NOTE — PLAN OF CARE
Problem: Adult Inpatient Plan of Care  Goal: Plan of Care Review  Outcome: Ongoing, Progressing  Goal: Patient-Specific Goal (Individualized)  Outcome: Ongoing, Progressing  Goal: Absence of Hospital-Acquired Illness or Injury  Outcome: Ongoing, Progressing  Goal: Optimal Comfort and Wellbeing  Outcome: Ongoing, Progressing  Goal: Readiness for Transition of Care  Outcome: Ongoing, Progressing     Problem: Diabetes Comorbidity  Goal: Blood Glucose Level Within Targeted Range  Outcome: Ongoing, Progressing       Pt denies any pain, nausea or vomiting. VSS and WNL. All needs met. Call light in reach. Safety precautions in place.

## 2023-05-26 NOTE — PROGRESS NOTES
[facility] - Telemetry [facility] Medicine  Progress Note    Patient Name: Mariann Huff  MRN: [id number]  Patient Class: OP- Observation   Admission Date: 5/25/2023  Length of Stay: 0 days  Attending Physician: Saul Chapa MD  Primary Care Provider: Jasbir Haney MD        Subjective:     Principal Problem:Acute pancreatitis        HPI:  63F with PMH of CAD s/p GINGER to ostial LAD in 2014, HTN, HLD, DM2, MURTAZA (not on CPAP), PAD presents with c/o abd pain and n/v x 3 days. Pt states she noticed abrupt onset of epigastric pain and n/v with no known inciting event. Denies fever, chills, sob, palpitations, diarrhea, constipation. No sick contacts. Workup in ED remarkable for K 5.4, CO2 22, Glu 302, Ca 10.8, BiliT 1.5, Lipase 2355, Utox negative, UA 4+ glu, trace protein, 1+ ketones, trace leuk, 8 wbc. CT abd/pelvis with no acute findings. No prior episodes of pancreatitis. Denies etoh use, but does take dapagliflozin and trulicity for the past 2-3 years and trulicity dose was recently increased a few months ago. Pt states her symptoms are improving and she now has appetite. Patient will be admitted to hospital medicine service for further management.      Overview/Hospital Course:  No notes on file    Interval History: Patient had no acute events overnight, did well, reports her abdominal pain is not gone but is significantly improved from presentation.  She has been tolerating a diet, denies any episodes of nausea or vomiting, and has been having regular bowel movements and urinating well.  Feels well to go home today.  Upon further questioning she reports that a few months ago she had a similar episode of nausea and vomiting that resolved after 2 days which she attributed to the increase in the dose of her trulicity.  She was not seen in the hospital at that time.      Objective:     Vital Signs (Most Recent):  Temp: 98.2 °F (36.8 °C) (05/26/23 0714)  Pulse: 87 (05/26/23 0714)  Resp: 18 (05/26/23 0714)  BP: (!)  140/71 (05/26/23 0714)  SpO2: (!) 93 % (05/26/23 0714) Vital Signs (24h Range):  Temp:  [98.1 °F (36.7 °C)-99 °F (37.2 °C)] 98.2 °F (36.8 °C)  Pulse:  [] 87  Resp:  [18] 18  SpO2:  [93 %-99 %] 93 %  BP: (100-189)/(50-89) 140/71     Weight: 70.8 kg (156 lb)  Body mass index is 26.78 kg/m².    Intake/Output Summary (Last 24 hours) at 5/26/2023 1127  Last data filed at 5/25/2023 1412  Gross per 24 hour   Intake 1000 ml   Output --   Net 1000 ml         Physical Exam  Constitutional:       General: She is not in acute distress.     Appearance: She is well-developed. She is not ill-appearing or toxic-appearing.   HENT:      Head: Normocephalic and atraumatic.   Eyes:      Pupils: Pupils are equal, round, and reactive to light.   Neck:      Vascular: No JVD.   Cardiovascular:      Rate and Rhythm: Normal rate and regular rhythm.      Heart sounds: Normal heart sounds. No murmur heard.    No friction rub. No gallop.   Pulmonary:      Effort: Pulmonary effort is normal. No respiratory distress.      Breath sounds: Normal breath sounds. No stridor. No wheezing or rales.   Abdominal:      General: Bowel sounds are normal. There is no distension.      Palpations: Abdomen is soft.      Tenderness: There is abdominal tenderness.   Musculoskeletal:         General: No tenderness. Normal range of motion.      Cervical back: Normal range of motion and neck supple.      Right lower leg: No edema.      Left lower leg: No edema.   Skin:     General: Skin is warm and dry.   Neurological:      General: No focal deficit present.      Mental Status: She is alert and oriented to person, place, and time. Mental status is at baseline.      Cranial Nerves: No cranial nerve deficit.   Psychiatric:         Behavior: Behavior normal.           Significant Labs: All pertinent labs within the past 24 hours have been reviewed.  BMP:   Recent Labs   Lab 05/26/23  0345   *      K 4.5      CO2 24   BUN 21   CREATININE 1.7*    CALCIUM 9.9   MG 2.7*     CBC:   Recent Labs   Lab 05/25/23  1134 05/25/23  1141 05/25/23  1202 05/26/23  0345   WBC 10.64  --   --  11.32   HGB 12.9  --   --  10.8*   HCT 41.7 41 44 36.3*     --   --  232     CMP:   Recent Labs   Lab 05/25/23  1134 05/26/23  0345    141   K 5.4* 4.5    105   CO2 22* 24   * 238*   BUN 21 21   CREATININE 1.3 1.7*   CALCIUM 10.8* 9.9   PROT 9.1* 7.3   ALBUMIN 4.2 3.4*   BILITOT 1.5* 1.1*   ALKPHOS 127 107   AST 22 26   ALT 13 11   ANIONGAP 16 12     Lipase:   Recent Labs   Lab 05/25/23  1134 05/26/23  0345   LIPASE 2355* 2107*       Significant Imaging: I have reviewed all pertinent imaging results/findings within the past 24 hours.  I have reviewed and interpreted all pertinent imaging results/findings within the past 24 hours.      Assessment/Plan:      * Acute pancreatitis  63F presented with c/o abd pain, n/v and found to have lipase 2355 and bili 1.5  CT abd/pelvis with no acute findings, no signs of pancreatitis or biliary dilatation  Likely drug induced pancreatitis, likely from trulicity, other etiologies ruled out including alcohol, RUQ ultrasound unremarkable, low likelihood of other drug induced etiologies on medication review  Received 1L fluid bolus in ED   Lipid panel not obtained in ED, will follow up as outpatient  LFTs and T bili trending down  Advancing to solid foods now, if patient does well can discharge later today      Chronic kidney disease, stage 3a  PAPA on CKD likely 2/2 IVVD  IV hydration  Avoid nephrotoxic meds  Monitor      Bilateral renal cysts  Bilateral renal cysts noted in 2022   Abdominal ultrasound this admission notes an incidental renal mass on R kidney but CT notes stable appearance    PAD (peripheral artery disease)  Cont ASA, statin, cilostazol      (HFpEF) heart failure with preserved ejection fraction  TTE 5/26/23   The estimated ejection fraction is 55%.   The quantitatively derived ejection fraction is  52%.   Normal right ventricular size with normal right ventricular systolic function.   Normal left ventricular diastolic function.   The left ventricle is normal in size with normal systolic function.   Normal central venous pressure (3 mmHg).  Cont ASA and lopressor      Acute deep vein thrombosis (DVT) of femoral vein of right lower extremity  On eliquis, was started on lovenox during hospitalization but will resume upon discharge today      Asthma  No signs of acute exacerbation  Albuterol PRN for sob/wheezing      Type 2 diabetes mellitus with stage 2 chronic kidney disease and hypertension  Patient's FSGs are uncontrolled due to hyperglycemia on current medication regimen.  Last A1c reviewed-   Lab Results   Component Value Date    HGBA1C 8.2 (H) 04/05/2023     Most recent fingerstick glucose reviewed- No results for input(s): POCTGLUCOSE in the last 24 hours.  Current correctional scale  Medium  Maintain anti-hyperglycemic dose as follows-   Antihyperglycemics (From admission, onward)    Start     Stop Route Frequency Ordered    05/25/23 1753  insulin aspart U-100 pen 1-10 Units         -- SubQ Every 6 hours PRN 05/25/23 1653        Hold Oral hypoglycemics while patient is in the hospital.    Sleep apnea  Not on CPAP      CAD (coronary artery disease)  Cont ASA and statin      Hyperlipidemia associated with type 2 diabetes mellitus  Cont statin    Hypertension associated with diabetes  Amlodipine, Lopressor, Losartan       VTE Risk Mitigation (From admission, onward)         Ordered     enoxaparin injection 70 mg  Every 12 hours         05/26/23 0555     Reason for No Pharmacological VTE Prophylaxis  Once        Question:  Reasons:  Answer:  Already adequately anticoagulated on oral Anticoagulants    05/25/23 1535     IP VTE HIGH RISK PATIENT  Once         05/25/23 1535     Place sequential compression device  Until discontinued         05/25/23 1535                Discharge Planning   REBECCA: 5/27/2023      Code Status: Full Code   Is the patient medically ready for discharge?: No    Reason for patient still in hospital (select all that apply): Patient trending condition         If patient tolerates oral diet, can discharge this afternoon            Saul Chapa MD  Department of Hospital Medicine   [facility] - Telemetry Stepdown

## 2023-05-26 NOTE — PLAN OF CARE
[facility] - Telemetry Stepdown  Discharge Final Note    Primary Care Provider: Jasbir Haney MD    Expected Discharge Date: 5/26/2023      Future Appointments   Date Time Provider Department Center   5/31/2023  8:30 AM JOSE F, PSYCHIATRY TESTING [facility] PS[facility]OL [address]   5/31/2023 10:00 AM Jasbir Haney MD [facility] IM [place]   6/5/2023  3:00 PM Mk George MD [facility] NEUROSU [facility]   6/7/2023  2:00 PM Natasha Greenfield, PhD [facility] PSYCHOL [address]   6/21/2023  3:00 PM Gabino Fry MD PhD [facility] CARDIO [place]   6/27/2023  7:30 AM Som Purcell OD [facility] OPTOMTY [place]   7/19/2023  7:20 AM Jasbir Haney MD [facility] IM [place]   7/20/2023  9:30 AM LAB, [facility] [place]   7/27/2023 11:00 AM Adelaide Aguilar MD [facility] CARDIO [place]          Final Discharge Note (most recent)       Final Note - 05/26/23 1528          Final Note    Assessment Type Final Discharge Note     Anticipated Discharge Disposition Home or Self Care     What phone number can be called within the next 1-3 days to see how you are doing after discharge? [phone]     Hospital Resources/Appts/Education Provided Appointments scheduled and added to AVS        Post-Acute Status    Post-Acute Authorization Other     Other Status No Post-Acute Service Needs     Discharge Delays None known at this time                     Important Message from Medicare             Contact Info       Jasbir Haney MD   Specialty: Internal Medicine   Relationship: PCP - General  Consulting Physician  Hypertension Digital Medicine Responsible Provider  Diabetes Digital Medicine Responsible Provider    [address] [facility] [address]  [place] [address]   Phone: [phone]       Next Steps: Follow up    Instructions:  sent a message to pt's primary care office to make contact with pt to schedule her hospital f/u visit.            Ching Dominique RN  Ext [phone]

## 2023-05-26 NOTE — ASSESSMENT & PLAN NOTE
63F presented with c/o abd pain, n/v and found to have lipase 2355 and bili 1.5  CT abd/pelvis with no acute findings, no signs of pancreatitis or biliary dilatation  Likely drug induced pancreatitis, likely from trulicity, other etiologies ruled out including alcohol, RUQ ultrasound unremarkable, low likelihood of other drug induced etiologies on medication review  Received 1L fluid bolus in ED   Lipid panel not obtained in ED, will follow up as outpatient  LFTs and T bili trending down  Advancing to solid foods now, if patient does well can discharge later today

## 2023-05-26 NOTE — ASSESSMENT & PLAN NOTE
TTE 5/26/23   The estimated ejection fraction is 55%.   The quantitatively derived ejection fraction is 52%.   Normal right ventricular size with normal right ventricular systolic function.   Normal left ventricular diastolic function.   The left ventricle is normal in size with normal systolic function.   Normal central venous pressure (3 mmHg).  Cont ASA and lopressor

## 2023-05-26 NOTE — PLAN OF CARE
sent a message to pt's primary care office to make contact with pt to schedule her hospital f/u visit.

## 2023-05-26 NOTE — SUBJECTIVE & OBJECTIVE
Interval History: Patient had no acute events overnight, did well, reports her abdominal pain is not gone but is significantly improved from presentation.  She has been tolerating a diet, denies any episodes of nausea or vomiting, and has been having regular bowel movements and urinating well.  Feels well to go home today.  Upon further questioning she reports that a few months ago she had a similar episode of nausea and vomiting that resolved after 2 days which she attributed to the increase in the dose of her trulicity.  She was not seen in the hospital at that time.      Objective:     Vital Signs (Most Recent):  Temp: 98.2 °F (36.8 °C) (05/26/23 0714)  Pulse: 87 (05/26/23 0714)  Resp: 18 (05/26/23 0714)  BP: (!) 140/71 (05/26/23 0714)  SpO2: (!) 93 % (05/26/23 0714) Vital Signs (24h Range):  Temp:  [98.1 °F (36.7 °C)-99 °F (37.2 °C)] 98.2 °F (36.8 °C)  Pulse:  [] 87  Resp:  [18] 18  SpO2:  [93 %-99 %] 93 %  BP: (100-189)/(50-89) 140/71     Weight: 70.8 kg (156 lb)  Body mass index is 26.78 kg/m².    Intake/Output Summary (Last 24 hours) at 5/26/2023 1127  Last data filed at 5/25/2023 1412  Gross per 24 hour   Intake 1000 ml   Output --   Net 1000 ml         Physical Exam  Constitutional:       General: She is not in acute distress.     Appearance: She is well-developed. She is not ill-appearing or toxic-appearing.   HENT:      Head: Normocephalic and atraumatic.   Eyes:      Pupils: Pupils are equal, round, and reactive to light.   Neck:      Vascular: No JVD.   Cardiovascular:      Rate and Rhythm: Normal rate and regular rhythm.      Heart sounds: Normal heart sounds. No murmur heard.    No friction rub. No gallop.   Pulmonary:      Effort: Pulmonary effort is normal. No respiratory distress.      Breath sounds: Normal breath sounds. No stridor. No wheezing or rales.   Abdominal:      General: Bowel sounds are normal. There is no distension.      Palpations: Abdomen is soft.      Tenderness: There is  abdominal tenderness.   Musculoskeletal:         General: No tenderness. Normal range of motion.      Cervical back: Normal range of motion and neck supple.      Right lower leg: No edema.      Left lower leg: No edema.   Skin:     General: Skin is warm and dry.   Neurological:      General: No focal deficit present.      Mental Status: She is alert and oriented to person, place, and time. Mental status is at baseline.      Cranial Nerves: No cranial nerve deficit.   Psychiatric:         Behavior: Behavior normal.           Significant Labs: All pertinent labs within the past 24 hours have been reviewed.  BMP:   Recent Labs   Lab 05/26/23  0345   *      K 4.5      CO2 24   BUN 21   CREATININE 1.7*   CALCIUM 9.9   MG 2.7*     CBC:   Recent Labs   Lab 05/25/23  1134 05/25/23  1141 05/25/23  1202 05/26/23  0345   WBC 10.64  --   --  11.32   HGB 12.9  --   --  10.8*   HCT 41.7 41 44 36.3*     --   --  232     CMP:   Recent Labs   Lab 05/25/23  1134 05/26/23  0345    141   K 5.4* 4.5    105   CO2 22* 24   * 238*   BUN 21 21   CREATININE 1.3 1.7*   CALCIUM 10.8* 9.9   PROT 9.1* 7.3   ALBUMIN 4.2 3.4*   BILITOT 1.5* 1.1*   ALKPHOS 127 107   AST 22 26   ALT 13 11   ANIONGAP 16 12     Lipase:   Recent Labs   Lab 05/25/23  1134 05/26/23  0345   LIPASE 2355* 2107*       Significant Imaging: I have reviewed all pertinent imaging results/findings within the past 24 hours.  I have reviewed and interpreted all pertinent imaging results/findings within the past 24 hours.

## 2023-05-27 NOTE — DISCHARGE SUMMARY
[facility] - Telemetry [facility] Medicine  Discharge Summary      Patient Name: Mariann Huff  MRN: [id number]  DIANA: [id number]  Patient Class: OP- Observation  Admission Date: 5/25/2023  Hospital Length of Stay: 0 days  Discharge Date and Time:  05/26/2023 7:14 PM  Attending Physician: Isis att. providers found   Discharging Provider: Saul Chapa MD  Primary Care Provider: Jasbir Haney MD  Hospital Medicine Team: [facility] HOSP MED S Saul Chapa MD  Primary Care Team: [facility] HOSP MED S    HPI:   63F with PMH of CAD s/p GINGER to ostial LAD in 2014, HTN, HLD, DM2, MURTAZA (not on CPAP), PAD presents with c/o abd pain and n/v x 3 days. Pt states she noticed abrupt onset of epigastric pain and n/v with no known inciting event. Denies fever, chills, sob, palpitations, diarrhea, constipation. No sick contacts. Workup in ED remarkable for K 5.4, CO2 22, Glu 302, Ca 10.8, BiliT 1.5, Lipase 2355, Utox negative, UA 4+ glu, trace protein, 1+ ketones, trace leuk, 8 wbc. CT abd/pelvis with no acute findings. No prior episodes of pancreatitis. Denies etoh use, but does take dapagliflozin and trulicity for the past 2-3 years and trulicity dose was recently increased a few months ago. Pt states her symptoms are improving and she now has appetite. Patient will be admitted to hospital medicine service for further management.      * No surgery found *      Hospital Course:   Patient was admitted for nausea and vomiting with elevated T bili and lipase consistent with pancreatitis.  Ethanol level, CT scan of the abdomen were unremarkable.  Lipase was >2,355 which downtrended this morning.  She was able to tolerate advancement of her diet and clinically appeared improved.  Other risk factors for pancreatitis were ruled out, there was no biliary ductal dilatation, no evidence of gallstones.  Upon further questioning the patient reported that shortly after increasing her dose of trulicity she had a similar episode that self resolved where she  experienced nausea and vomiting.  Her Trulicity was discontinued upon discharge, low risk of pancreatitis from [facility] and she was advised to follow up with her PCP within the next week.  She did not have any issues at the time of discharge, was hemodynamically stable and was tolerating a diet well.         Goals of Care Treatment Preferences:  Code Status: Full Code      Consults:     No new Assessment & Plan notes have been filed under this hospital service since the last note was generated.  Service: Hospital Medicine    Final Active Diagnoses:    Diagnosis Date Noted POA    PRINCIPAL PROBLEM:  Acute pancreatitis [K85.90] 05/25/2023 Yes    Chronic kidney disease, stage 3a [N18.31] 08/23/2022 Yes    Bilateral renal cysts [N28.1] 08/10/2022 Not Applicable    PAD (peripheral artery disease) [I73.9] 05/04/2022 Yes     Chronic    (HFpEF) heart failure with preserved ejection fraction [I50.30] 06/06/2019 Yes     Chronic    Acute deep vein thrombosis (DVT) of femoral vein of right lower extremity [I82.411]  Yes     Chronic    Type 2 diabetes mellitus with stage 2 chronic kidney disease and hypertension [E11.22, I12.9, N18.2] 04/20/2019 Yes     Chronic    Asthma [J45.909] 04/20/2019 Yes     Chronic    Sleep apnea [G47.30]  Yes     Chronic    Hypertension associated with diabetes [E11.59, I15.2]  Yes     Chronic    Hyperlipidemia associated with type 2 diabetes mellitus [E11.69, E78.5]  Yes     Chronic    CAD (coronary artery disease) [I25.10]  Yes     Chronic      Problems Resolved During this Admission:       Discharged Condition: stable    Disposition: Home or Self Care    Follow Up:   Follow-up Information     Jasbir Haney MD Follow up.    Specialty: Internal Medicine  Why:  sent a message to pt's primary care office to make contact with pt to schedule her hospital f/u visit.  Contact information:  [address] [facility] [facility]  [place] [id number] [phone]                       Patient  Instructions:      Diet diabetic     Notify your health care provider if you experience any of the following:  severe uncontrolled pain       Significant Diagnostic Studies: Labs:   BMP:   Recent Labs   Lab 05/25/23  1134 05/26/23  0345   * 238*    141   K 5.4* 4.5    105   CO2 22* 24   BUN 21 21   CREATININE 1.3 1.7*   CALCIUM 10.8* 9.9   MG  --  2.7*   , CMP   Recent Labs   Lab 05/25/23  1134 05/26/23  0345    141   K 5.4* 4.5    105   CO2 22* 24   * 238*   BUN 21 21   CREATININE 1.3 1.7*   CALCIUM 10.8* 9.9   PROT 9.1* 7.3   ALBUMIN 4.2 3.4*   BILITOT 1.5* 1.1*   ALKPHOS 127 107   AST 22 26   ALT 13 11   ANIONGAP 16 12   , CBC   Recent Labs   Lab 05/25/23  1134 05/25/23  1141 05/26/23  0345   WBC 10.64  --  11.32   HGB 12.9  --  10.8*   HCT 41.7   < > 36.3*     --  232    < > = values in this interval not displayed.    and All labs within the past 24 hours have been reviewed    Pending Diagnostic Studies:     None         Medications:  Reconciled Home Medications:      Medication List      CONTINUE taking these medications    ACCU-CHEK EDIN PLUS METER Misc  Generic drug: blood-glucose meter     albuterol 90 mcg/actuation inhaler  Commonly known as: PROVENTIL/VENTOLIN HFA  Inhale 2 puffs into the lungs every 6 (six) hours as needed for Wheezing.     albuterol-ipratropium 2.5 mg-0.5 mg/3 mL nebulizer solution  Commonly known as: DUO-NEB  Inhale 3 mLs into the lungs.     amLODIPine 10 MG tablet  Commonly known as: NORVASC  Take 1 tablet (10 mg total) by mouth once daily.     aspirin 81 mg Tab  Take 81 mg by mouth. 1 Tablet Oral Every day     atorvastatin 40 MG tablet  Commonly known as: LIPITOR  Take 1 tablet (40 mg total) by mouth every evening.     blood sugar diagnostic Strp  Commonly known as: ACCU-CHEK EDIN PLUS TEST STRP  TEST BLOOD SUGARS 4 TIMES DAILY     cilostazoL 100 MG Tab  Commonly known as: PLETAL  Take 1 tablet (100 mg total) by mouth 2 (two) times  "daily.     diclofenac sodium 1 % Gel  Commonly known as: VOLTAREN  Apply 2 g topically 3 (three) times daily. Apply to the area of pain 2-3x per day or night as needed     ELIQUIS 2.5 mg Tab  Generic drug: apixaban  Take 1 tablet (2.5 mg total) by mouth 2 (two) times daily.     EScitalopram oxalate 10 MG tablet  Commonly known as: LEXAPRO  Take 1 tablet (10 mg total) by mouth once daily.     eszopiclone 2 MG Tab  Commonly known as: LUNESTA  Take 1 tablet (2 mg total) by mouth every evening.     FARXIGA 10 mg tablet  Generic drug: dapagliflozin  Take 1 tablet (10 mg total) by mouth once daily.     gabapentin 300 MG capsule  Commonly known as: NEURONTIN  Take 1 capsule (300 mg) in the morning and 2 capsules (600 mg) in the evening     glipiZIDE 10 MG Tr24  Commonly known as: GLUCOTROL  Take 1 tablet (10 mg total) by mouth daily with breakfast.     hydroCHLOROthiazide 12.5 MG Tab  Commonly known as: HYDRODIURIL  Take 1 tablet (12.5 mg total) by mouth once daily.     isosorbide mononitrate 10 mg tablet  Commonly known as: ISMO,MONOKET  Take 1 tablet (10 mg total) by mouth once daily.     metoprolol tartrate 50 MG tablet  Commonly known as: LOPRESSOR  TAKE 1 TABLET BY MOUTH TWICE A DAY     multivitamin per tablet  Commonly known as: THERAGRAN  Take 1 tablet by mouth once daily.     pen needle, diabetic 32 gauge x 1/5" Ndle  Commonly known as: NOVOTWIST  Use 1 needle to inject insulin four times daily     potassium chloride SA 20 MEQ tablet  Commonly known as: K-DUR,KLOR-CON  Take 1 tablet (20 mEq total) by mouth 2 (two) times daily.     promethazine 12.5 MG Tab  Commonly known as: PHENERGAN  Take 1 tablet (12.5 mg total) by mouth every 8 (eight) hours as needed (nausea).     REPATHA SURECLICK 140 mg/mL Pnij  Generic drug: evolocumab  Inject 1 mL (140 mg total) into the skin every 14 (fourteen) days.     telmisartan 80 MG Tab  Commonly known as: MICARDIS  Take 1 tablet (80 mg total) by mouth once daily. Stop losartan      "   STOP taking these medications    TRULICITY 4.5 mg/0.5 mL pen injector  Generic drug: dulaglutide            Indwelling Lines/Drains at time of discharge:   Lines/Drains/Airways     None                 Time spent on the discharge of patient: 45 minutes         Saul Chapa MD  Department of Hospital Medicine  [facility] - Telemetry Stepdown

## 2023-05-27 NOTE — HOSPITAL COURSE
Patient was admitted for nausea and vomiting with elevated T bili and lipase consistent with pancreatitis.  Ethanol level, CT scan of the abdomen were unremarkable.  Lipase was >2,355 which downtrended this morning.  She was able to tolerate advancement of her diet and clinically appeared improved.  Other risk factors for pancreatitis were ruled out, there was no biliary ductal dilatation, no evidence of gallstones.  Upon further questioning the patient reported that shortly after increasing her dose of trulicity she had a similar episode that self resolved where she experienced nausea and vomiting.  Her Trulicity was discontinued upon discharge, low risk of pancreatitis from [facility] and she was advised to follow up with her PCP within the next week.  She did not have any issues at the time of discharge, was hemodynamically stable and was tolerating a diet well.

## 2023-05-30 NOTE — PROGRESS NOTES
Spoke to patient informed orders were sent.    Subjective:       Patient ID: Mariann Huff is a 62 y.o. female.    Chief Complaint: Annual Exam and Shoulder Pain    HPI    62 y.o. female here for annual exam.     Cholesterol: WNL  Vaccines: Influenza - needs; Tetanus - 2017; Prevnar 20 - 23 done; Zoster - needs; COVID - 3 done (planning to get COVID booster tomorrow)  Sexual Screening: active  STD screening: no concern  Eye exam: done last year.  Mammogram: due  Gyn exam: needs  Colonoscopy: 2019, due  A1c: 8.8. trulicity 0.75 mg, farxiga 10 mg    Exercise: no regular exercise.  She is going to get a weight bench today.  Diet: mix of fast food, home cooked, eating out    Right shoulder - She reports that her right shoulder has been hurting.  She cannot lift her right arm above level with her right shoulder.  She cannot sleep on it. It has been bothering her for a while now (month).  The pain is described as an aching pain.    Past Medical History:   Diagnosis Date    Anticoagulant long-term use     Asthma     Back pain     Bradycardia, unspecified 5/8/2019    The etiology of the bradycardic episode is unclear.  The have appear to be respiratory in origin (at least the 1st episode).  We will continue to monitor carefully.  We are awaiting evaluation by Cardiology.      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, with long-term current use of insulin 1/9/2020     Past Surgical History:   Procedure  Laterality Date    ANTEGRADE NEPHROSTOGRAPHY Left 2019    Procedure: Nephrostogram - antegrade;  Surgeon: Robin Boyd MD;  [facility];  Service: Urology;  Laterality: Left;    BRONCHOSCOPY N/A 2019    Procedure: BRONCHOSCOPY;  Surgeon: Sean Ruano MD;  [facility];  Service: General;  Laterality: N/A;    CARDIAC CATHETERIZATION      CATARACT EXTRACTION      cataract extraction left eye      cataracts      CAUDAL EPIDURAL STEROID INJECTION N/A 2019    Procedure: Injection-steroid-epidural-caudal;  Surgeon: Dave Bentley Jr., MD;  Location: [facility] PAIN MGT;  Service: Pain Management;  Laterality: N/A;     SECTION, LOW TRANSVERSE      COLONOSCOPY N/A 2019    Procedure: COLONOSCOPY;  Surgeon: Al Alaniz MD;  Location: [facility] ENDO (2ND FLR);  Service: Endoscopy;  Laterality: N/A;    CORONARY ANGIOPLASTY      CYSTOGRAM N/A 2019    Procedure: CYSTOGRAM INTRAOP;  Surgeon: Robin Boyd MD;  [facility];  Service: Urology;  Laterality: N/A;  1 HOUR    CYSTOSCOPY W/ RETROGRADES Left 2019    Procedure: CYSTOSCOPY, WITH RETROGRADE PYELOGRAM;  Surgeon: Robin Boyd MD;  [facility]: [facility];  Service: Urology;  Laterality: Left;  fluro    ESOPHAGOGASTRODUODENOSCOPY W/ PEG  2019    Procedure: EGD, WITH PEG TUBE INSERTION;  Surgeon: Sean Ruano MD;  [facility];  Service: General;;    EXCISION TURBINATE, SUBMUCOUS      FLEXIBLE SIGMOIDOSCOPY N/A 2019    Procedure: SIGMOIDOSCOPY, FLEXIBLE;  Surgeon: ALBERTO Amin MD;  Location: [facility] ENDO (2ND FLR);  Service: Endoscopy;  Laterality: N/A;    FLEXIBLE SIGMOIDOSCOPY N/A 2019    Procedure: SIGMOIDOSCOPY, FLEXIBLE;  Surgeon: ALBERTO Amin MD;  Location: [facility] ENDO (2ND FLR);  Service: Endoscopy;  Laterality: N/A;    FUSION OF LUMBAR SPINE BY ANTERIOR APPROACH Left 2019    Procedure: FUSION, SPINE, LUMBAR, ANTERIOR APPROACH Left L5-S1  Anterior to Psoa Interbody Fusion, L5-S1 Posterior Instrumentation;  Surgeon: Mk George MD;  [facility];  Service: Neurosurgery;  Laterality: Left;  Porcedure:  Left L5-S1 Anterior to Psoa Interbody Fusion, L5-S1 Posterior Instrumentation  Surgery Time: 4 Hrs  LOS: 2-3  Anesthesia: General   Blood: Type & Screen  R    HAND SURGERY Left     HAND SURGERY Right     torn ligament repair/ Dr. Yeboah    HYSTERECTOMY      INJECTION OF STEROID Right 12/10/2020    Procedure: INJECTION, STEROID Right SI Joint Block and Steroid Injection;  Surgeon: Mk George MD;  Location: [facility];  Service: Neurosurgery;  Laterality: Right;  Procedure: Right SI Joint Block and Steroid Injection   SUrgery Time: 30 Min  LOS: 0  Anesthesia: MAC  Radiology: C-arm  Bed: [place] 4 Poster  Position: Prone    INJECTION OF STEROID Right 9/28/2021    Procedure: INJECTION, STEROID Right SI joint block & steroid injection;  Surgeon: Mk George MD;  Location: [facility];  Service: Neurosurgery;  Laterality: Right;  Procedure: Right SI joint block & steroid injection  Surgery Time: 30m  Anesthesia: Local MAC  Radiology: C-arm  Bed: Regular  Position: Prone    left foot surgery      left wrist surgery      LYSIS OF ADHESIONS N/A 2/19/2020    Procedure: LYSIS, ADHESIONS;  Surgeon: Robin Boyd MD;  Location: [address];  Service: Urology;  Laterality: N/A;    NASAL SEPTUM SURGERY  5/7/15    PERCUTANEOUS NEPHROSTOMY Left 4/21/2019    Procedure: Creation, Nephrostomy, Percutaneous;  Surgeon: Karina Surgeon;  Location: [facility];  Service: Anesthesiology;  Laterality: Left;    REPAIR OF URETER  4/12/2019    Procedure: REPAIR, URETER;  Surgeon: Mk George MD;  [address];  Service: Neurosurgery;;    REPLACEMENT OF NEPHROSTOMY TUBE N/A 7/18/2019    Procedure: REPLACEMENT, NEPHROSTOMY TUBE;  Surgeon: [facility] Diagnostic Provider;  [address];  Service: Anesthesiology;  Laterality: N/A;  188     REPLACEMENT OF NEPHROSTOMY TUBE N/A 7/24/2019    Procedure: REPLACEMENT, NEPHROSTOMY TUBE;  Surgeon: [facility] Diagnostic Provider;  Location: [facility] OR 2ND FLR;  Service: Anesthesiology;  Laterality: N/A;  188    REPLACEMENT OF NEPHROSTOMY TUBE N/A 10/7/2019    Procedure: REPLACEMENT, NEPHROSTOMY TUBE;  Surgeon: [facility] Diagnostic Provider;  Location: [facility] OR 2ND FLR;  Service: Anesthesiology;  Laterality: N/A;  189    REPLACEMENT OF NEPHROSTOMY TUBE N/A 11/25/2019    Procedure: REPLACEMENT, NEPHROSTOMY TUBE;  Surgeon: [facility] Diagnostic Provider;  Location: [facility] OR [facility] FLR;  Service: Anesthesiology;  Laterality: N/A;  Room 188/Bessy    REPLACEMENT OF NEPHROSTOMY TUBE Right 2/19/2020    Procedure: REPLACEMENT, NEPHROSTOMY TUBE removal removal;  Surgeon: Robin Boyd MD;  [facility];  Service: Urology;  Laterality: Right;    rt elbow surgery      S/P LAD COATED STENT  05/14/2010    6 total     S/P OM1 STENT  08/2003    SINUS SURGERY      F.E.S.S.    TRACHEOSTOMY N/A 5/2/2019    Procedure: CREATION, TRACHEOSTOMY;  Surgeon: Sean Ruano MD;  [facility];  Service: General;  Laterality: N/A;    TUBAL LIGATION      URETERAL REIMPLANTION Left 2/19/2020    Procedure: REIMPLANTATION, URETER WITH PSOAS HITCH;  Surgeon: Robin Boyd MD;  [facility];  Service: Urology;  Laterality: Left;     Social History     Socioeconomic History    Marital status:      Spouse name: Shamir    Number of children: 2   Occupational History    Occupation: cafeteria     Employer: [employer]     Employer: [employer]     Employer: [employer]   Tobacco Use    Smoking status: Never    Smokeless tobacco: Never   Substance and Sexual Activity    Alcohol use: No     Alcohol/week: 0.0 standard drinks    Drug use: No    Sexual activity: Yes     Partners: Male     Birth control/protection: Post-menopausal     Comment:    Other Topics Concern    Are you pregnant or  think you may be? No    Breast-feeding No   Social History Narrative    . 2 children.      Review of patient's allergies indicates:   Allergen Reactions    Milk containing products      Causes GI distress     Mrs. Mariann Huff had no medications administered during this visit.      Review of Systems      Objective:      Physical Exam  Vitals reviewed.   Constitutional:       Appearance: She is well-developed.   HENT:      Head: Normocephalic and atraumatic.      Mouth/Throat:      Pharynx: No oropharyngeal exudate.   Eyes:      General: No scleral icterus.        Right eye: No discharge.         Left eye: No discharge.      Pupils: Pupils are equal, round, and reactive to light.   Neck:      Thyroid: No thyromegaly.      Trachea: No tracheal deviation.   Cardiovascular:      Rate and Rhythm: Normal rate and regular rhythm.      Heart sounds: Normal heart sounds. No murmur heard.    No friction rub. No gallop.   Pulmonary:      Effort: Pulmonary effort is normal. No respiratory distress.      Breath sounds: Normal breath sounds. No wheezing or rales.   Chest:      Chest wall: No tenderness.   Abdominal:      General: Bowel sounds are normal. There is no distension.      Palpations: Abdomen is soft. There is no mass.      Tenderness: There is no abdominal tenderness. There is no guarding or rebound.   Musculoskeletal:         General: No tenderness. Normal range of motion.      Cervical back: Normal range of motion and neck supple.   Skin:     General: Skin is warm and dry.      Coloration: Skin is not pale.      Findings: No erythema or rash.   Neurological:      Mental Status: She is alert and oriented to person, place, and time.   Psychiatric:         Behavior: Behavior normal.       Assessment:       1. Annual physical exam    2. Type 2 diabetes mellitus with retinopathy, with long-term current use of insulin, macular edema presence unspecified, unspecified laterality, unspecified retinopathy  severity    3. Type 2 diabetes mellitus with stage 2 chronic kidney disease and hypertension    4. Type 2 diabetes mellitus with diabetic peripheral angiopathy without gangrene, without long-term current use of insulin    5. Hypertension associated with diabetes    6. Hyperlipidemia associated with type 2 diabetes mellitus    7. Carotid stenosis, bilateral    8. Coronary artery disease involving native coronary artery of native heart without angina pectoris    9. Heart failure with preserved ejection fraction, unspecified HF chronicity    10. Visit for screening mammogram      Plan:       1. Reviewed lab with up-to-date on vaccines.  Flu vaccine given today.  Discussed diet and exercise.    2/3/4.  Continue Farxiga 10 mg.  Increase Trulicity to 1.5 mg.  Plan to max out at 4.5 mg.  Return to clinic in a month reassess.    5. Amlodipine 10 mg, Micardis 80 mg, Lopressor 50 mg b.i.d., HCTZ 12.5 mg.    6.  Continue Lipitor 40 mg.    7/8.  Continue Lipitor aspirin 81 mg.    9. Monitor  10. Check mammogram.      Return to clinic in a month or sooner if needed.

## 2023-05-31 ENCOUNTER — OFFICE VISIT (OUTPATIENT)
Dept: INTERNAL MEDICINE | Facility: CLINIC | Age: 63
End: 2023-05-31
Payer: COMMERCIAL

## 2023-05-31 ENCOUNTER — PATIENT MESSAGE (OUTPATIENT)
Dept: INTERNAL MEDICINE | Facility: CLINIC | Age: 63
End: 2023-05-31

## 2023-05-31 ENCOUNTER — PATIENT MESSAGE (OUTPATIENT)
Dept: ADMINISTRATIVE | Facility: OTHER | Age: 63
End: 2023-05-31
Payer: COMMERCIAL

## 2023-05-31 ENCOUNTER — LAB VISIT (OUTPATIENT)
Dept: LAB | Facility: HOSPITAL | Age: 63
End: 2023-05-31
Attending: INTERNAL MEDICINE
Payer: COMMERCIAL

## 2023-05-31 VITALS
RESPIRATION RATE: 20 BRPM | DIASTOLIC BLOOD PRESSURE: 76 MMHG | OXYGEN SATURATION: 99 % | HEIGHT: 64 IN | SYSTOLIC BLOOD PRESSURE: 132 MMHG | BODY MASS INDEX: 25.25 KG/M2 | HEART RATE: 89 BPM | WEIGHT: 147.94 LBS | TEMPERATURE: 98 F

## 2023-05-31 DIAGNOSIS — Z79.4 TYPE 2 DIABETES MELLITUS WITH DIABETIC PERIPHERAL ANGIOPATHY WITHOUT GANGRENE, WITH LONG-TERM CURRENT USE OF INSULIN: ICD-10-CM

## 2023-05-31 DIAGNOSIS — K85.80 OTHER ACUTE PANCREATITIS, UNSPECIFIED COMPLICATION STATUS: ICD-10-CM

## 2023-05-31 DIAGNOSIS — E11.51 TYPE 2 DIABETES MELLITUS WITH DIABETIC PERIPHERAL ANGIOPATHY WITHOUT GANGRENE, WITH LONG-TERM CURRENT USE OF INSULIN: ICD-10-CM

## 2023-05-31 DIAGNOSIS — Z09 HOSPITAL DISCHARGE FOLLOW-UP: Primary | ICD-10-CM

## 2023-05-31 LAB
ALBUMIN SERPL BCP-MCNC: 3.3 G/DL (ref 3.5–5.2)
ALP SERPL-CCNC: 109 U/L (ref 55–135)
ALT SERPL W/O P-5'-P-CCNC: 10 U/L (ref 10–44)
AMYLASE SERPL-CCNC: 1787 U/L (ref 20–110)
ANION GAP SERPL CALC-SCNC: 15 MMOL/L (ref 8–16)
AST SERPL-CCNC: 14 U/L (ref 10–40)
BILIRUB SERPL-MCNC: 1 MG/DL (ref 0.1–1)
BUN SERPL-MCNC: 23 MG/DL (ref 8–23)
CALCIUM SERPL-MCNC: 11.2 MG/DL (ref 8.7–10.5)
CHLORIDE SERPL-SCNC: 101 MMOL/L (ref 95–110)
CO2 SERPL-SCNC: 24 MMOL/L (ref 23–29)
CREAT SERPL-MCNC: 1.3 MG/DL (ref 0.5–1.4)
EST. GFR  (NO RACE VARIABLE): 46.5 ML/MIN/1.73 M^2
GLUCOSE SERPL-MCNC: 322 MG/DL (ref 70–110)
LIPASE SERPL-CCNC: 2645 U/L (ref 4–60)
POTASSIUM SERPL-SCNC: 4.7 MMOL/L (ref 3.5–5.1)
PROT SERPL-MCNC: 8.6 G/DL (ref 6–8.4)
SODIUM SERPL-SCNC: 140 MMOL/L (ref 136–145)

## 2023-05-31 PROCEDURE — 1159F PR MEDICATION LIST DOCUMENTED IN MEDICAL RECORD: ICD-10-PCS | Mod: CPTII,S$GLB,, | Performed by: INTERNAL MEDICINE

## 2023-05-31 PROCEDURE — 80053 COMPREHEN METABOLIC PANEL: CPT | Performed by: INTERNAL MEDICINE

## 2023-05-31 PROCEDURE — 99214 PR OFFICE/OUTPT VISIT, EST, LEVL IV, 30-39 MIN: ICD-10-PCS | Mod: S$GLB,,, | Performed by: INTERNAL MEDICINE

## 2023-05-31 PROCEDURE — 99214 OFFICE O/P EST MOD 30 MIN: CPT | Mod: S$GLB,,, | Performed by: INTERNAL MEDICINE

## 2023-05-31 PROCEDURE — 83690 ASSAY OF LIPASE: CPT | Performed by: INTERNAL MEDICINE

## 2023-05-31 PROCEDURE — 3061F NEG MICROALBUMINURIA REV: CPT | Mod: CPTII,S$GLB,, | Performed by: INTERNAL MEDICINE

## 2023-05-31 PROCEDURE — 99999 PR PBB SHADOW E&M-EST. PATIENT-LVL V: ICD-10-PCS | Mod: PBBFAC,,, | Performed by: INTERNAL MEDICINE

## 2023-05-31 PROCEDURE — 3075F PR MOST RECENT SYSTOLIC BLOOD PRESS GE 130-139MM HG: ICD-10-PCS | Mod: CPTII,S$GLB,, | Performed by: INTERNAL MEDICINE

## 2023-05-31 PROCEDURE — 3075F SYST BP GE 130 - 139MM HG: CPT | Mod: CPTII,S$GLB,, | Performed by: INTERNAL MEDICINE

## 2023-05-31 PROCEDURE — 1160F RVW MEDS BY RX/DR IN RCRD: CPT | Mod: CPTII,S$GLB,, | Performed by: INTERNAL MEDICINE

## 2023-05-31 PROCEDURE — 36415 COLL VENOUS BLD VENIPUNCTURE: CPT | Mod: PO | Performed by: INTERNAL MEDICINE

## 2023-05-31 PROCEDURE — 3078F PR MOST RECENT DIASTOLIC BLOOD PRESSURE < 80 MM HG: ICD-10-PCS | Mod: CPTII,S$GLB,, | Performed by: INTERNAL MEDICINE

## 2023-05-31 PROCEDURE — 1159F MED LIST DOCD IN RCRD: CPT | Mod: CPTII,S$GLB,, | Performed by: INTERNAL MEDICINE

## 2023-05-31 PROCEDURE — 4010F ACE/ARB THERAPY RXD/TAKEN: CPT | Mod: CPTII,S$GLB,, | Performed by: INTERNAL MEDICINE

## 2023-05-31 PROCEDURE — 4010F PR ACE/ARB THEARPY RXD/TAKEN: ICD-10-PCS | Mod: CPTII,S$GLB,, | Performed by: INTERNAL MEDICINE

## 2023-05-31 PROCEDURE — 3061F PR NEG MICROALBUMINURIA RESULT DOCUMENTED/REVIEW: ICD-10-PCS | Mod: CPTII,S$GLB,, | Performed by: INTERNAL MEDICINE

## 2023-05-31 PROCEDURE — 82150 ASSAY OF AMYLASE: CPT | Performed by: INTERNAL MEDICINE

## 2023-05-31 PROCEDURE — 1160F PR REVIEW ALL MEDS BY PRESCRIBER/CLIN PHARMACIST DOCUMENTED: ICD-10-PCS | Mod: CPTII,S$GLB,, | Performed by: INTERNAL MEDICINE

## 2023-05-31 PROCEDURE — 3052F HG A1C>EQUAL 8.0%<EQUAL 9.0%: CPT | Mod: CPTII,S$GLB,, | Performed by: INTERNAL MEDICINE

## 2023-05-31 PROCEDURE — 99999 PR PBB SHADOW E&M-EST. PATIENT-LVL V: CPT | Mod: PBBFAC,,, | Performed by: INTERNAL MEDICINE

## 2023-05-31 PROCEDURE — 3008F BODY MASS INDEX DOCD: CPT | Mod: CPTII,S$GLB,, | Performed by: INTERNAL MEDICINE

## 2023-05-31 PROCEDURE — 3066F PR DOCUMENTATION OF TREATMENT FOR NEPHROPATHY: ICD-10-PCS | Mod: CPTII,S$GLB,, | Performed by: INTERNAL MEDICINE

## 2023-05-31 PROCEDURE — 3066F NEPHROPATHY DOC TX: CPT | Mod: CPTII,S$GLB,, | Performed by: INTERNAL MEDICINE

## 2023-05-31 PROCEDURE — 3052F PR MOST RECENT HEMOGLOBIN A1C LEVEL 8.0 - < 9.0%: ICD-10-PCS | Mod: CPTII,S$GLB,, | Performed by: INTERNAL MEDICINE

## 2023-05-31 PROCEDURE — 3078F DIAST BP <80 MM HG: CPT | Mod: CPTII,S$GLB,, | Performed by: INTERNAL MEDICINE

## 2023-05-31 PROCEDURE — 3008F PR BODY MASS INDEX (BMI) DOCUMENTED: ICD-10-PCS | Mod: CPTII,S$GLB,, | Performed by: INTERNAL MEDICINE

## 2023-05-31 RX ORDER — INSULIN DETEMIR 100 [IU]/ML
20 INJECTION, SOLUTION SUBCUTANEOUS NIGHTLY
Qty: 18 ML | Refills: 3 | Status: ON HOLD | OUTPATIENT
Start: 2023-05-31 | End: 2023-06-25 | Stop reason: SDUPTHER

## 2023-05-31 NOTE — Clinical Note
She had pancreatitis with trulicity.  She does not tolerate metformin.  I am putting her on levemir 20 units.  Can you get her in to help with her diabetes management?  Jasbir Haney

## 2023-05-31 NOTE — PROGRESS NOTES
Subjective:       Patient ID: Mariann Huff is a 63 y.o. female.    Chief Complaint: Follow-up    HPI    63-year-old female here for hospital follow-up.  Discharged 05/26/2023.    Family and/or Caretaker present at visit?  No.  Diagnostic tests reviewed/disposition: I have reviewed all completed as well as pending diagnostic tests at the time of discharge.  Disease/illness education: pancreatitis  Home health/community services discussion/referrals: Patient does not have home health established from hospital visit.  They do not need home health.  If needed, we will set up home health for the patient.   Establishment or re-establishment of referral orders for community resources: No other necessary community resources.   Discussion with other health care providers: No discussion with other health care providers necessary.  I reviewed and reconciled the medications from hospital discharge.    She had an episode of pancreatitis when her trulicity was increased initially, and a second episode that sent her to the hospital.  She is still weak.  She is drinking gatoraide and eating chicken broth.  She went out to eat Sunday and ate and the food came back up.  She had fried catfish.      Discharge summary:  HPI:   63F with PMH of CAD s/p GINGER to ostial LAD in 2014, HTN, HLD, DM2, MURTAZA (not on CPAP), PAD presents with c/o abd pain and n/v x 3 days. Pt states she noticed abrupt onset of epigastric pain and n/v with no known inciting event. Denies fever, chills, sob, palpitations, diarrhea, constipation. No sick contacts. Workup in ED remarkable for K 5.4, CO2 22, Glu 302, Ca 10.8, BiliT 1.5, Lipase 2355, Utox negative, UA 4+ glu, trace protein, 1+ ketones, trace leuk, 8 wbc. CT abd/pelvis with no acute findings. No prior episodes of pancreatitis. Denies etoh use, but does take dapagliflozin and trulicity for the past 2-3 years and trulicity dose was recently increased a few months ago. Pt states her symptoms are improving  and she now has appetite. Patient will be admitted to hospital medicine service for further management.        * No surgery found *       Hospital Course:   Patient was admitted for nausea and vomiting with elevated T bili and lipase consistent with pancreatitis.  Ethanol level, CT scan of the abdomen were unremarkable.  Lipase was >2,355 which downtrended this morning.  She was able to tolerate advancement of her diet and clinically appeared improved.  Other risk factors for pancreatitis were ruled out, there was no biliary ductal dilatation, no evidence of gallstones.  Upon further questioning the patient reported that shortly after increasing her dose of trulicity she had a similar episode that self resolved where she experienced nausea and vomiting.  Her Trulicity was discontinued upon discharge, low risk of pancreatitis from [facility] and she was advised to follow up with her PCP within the next week.  She did not have any issues at the time of discharge, was hemodynamically stable and was tolerating a diet well.      Review of Systems      Objective:      Physical Exam  Vitals reviewed.   Constitutional:       Appearance: She is well-developed.   HENT:      Head: Normocephalic and atraumatic.      Mouth/Throat:      Pharynx: No oropharyngeal exudate.   Eyes:      General: No scleral icterus.        Right eye: No discharge.         Left eye: No discharge.      Pupils: Pupils are equal, round, and reactive to light.   Neck:      Thyroid: No thyromegaly.      Trachea: No tracheal deviation.   Cardiovascular:      Rate and Rhythm: Normal rate and regular rhythm.      Heart sounds: Normal heart sounds. No murmur heard.    No friction rub. No gallop.   Pulmonary:      Effort: Pulmonary effort is normal. No respiratory distress.      Breath sounds: Normal breath sounds. No wheezing or rales.   Chest:      Chest wall: No tenderness.   Abdominal:      General: Bowel sounds are normal. There is no distension.       Palpations: Abdomen is soft. There is no mass.      Tenderness: There is generalized abdominal tenderness. There is no guarding or rebound.   Musculoskeletal:         General: No tenderness. Normal range of motion.      Cervical back: Normal range of motion and neck supple.   Skin:     General: Skin is warm and dry.      Coloration: Skin is not pale.      Findings: No erythema or rash.   Neurological:      Mental Status: She is alert and oriented to person, place, and time.   Psychiatric:         Behavior: Behavior normal.       Assessment:       1. Hospital discharge follow-up    2. Other acute pancreatitis, unspecified complication status  - Comprehensive Metabolic Panel; Future  - Amylase; Future  - Lipase; Future    3. Type 2 diabetes mellitus with diabetic peripheral angiopathy without gangrene, with long-term current use of insulin  - Ambulatory referral/consult to Internal Medicine; Future      Plan:       1/2.  Check CMP, amylase, lipase.  Discussed advancing diet.  3.  Refer to Endocrine nurse practitioner.  Start Levemir 20 units.  Continue glipizide.

## 2023-06-07 ENCOUNTER — SPECIALTY PHARMACY (OUTPATIENT)
Dept: PHARMACY | Facility: CLINIC | Age: 63
End: 2023-06-07

## 2023-06-07 NOTE — TELEPHONE ENCOUNTER
Outgoing call to pt regarding Ronald Pt has missed both doses this month due to being in the hospital in April and still feeling very sick and cannot keep anything down. She would like to speak with someone regarding what she should about her injections. Zena Garvey advised me to let pt know that she does not have to hold injections due to being sick or she can wait until her doctor clear her to start up again. Pt verbalized understanding. Pt requested a follow up on 6/12.

## 2023-06-08 ENCOUNTER — PATIENT OUTREACH (OUTPATIENT)
Dept: ADMINISTRATIVE | Facility: HOSPITAL | Age: 63
End: 2023-06-08
Payer: COMMERCIAL

## 2023-06-08 NOTE — PROGRESS NOTES
Health Maintenance Due   Topic Date Due    Shingles Vaccine (1 of 2) Never done    Pneumococcal Vaccines (Age 0-64) (2 - PCV) 12/06/2020    Foot Exam  04/20/2022       Chart reviewed.   Immunizations: Reconciled  Orders placed: N/A  Upcoming appts to satisfy JENNIFER topics: Optometry 6/27/2023, Labs 7/20/2023

## 2023-06-09 ENCOUNTER — HOSPITAL ENCOUNTER (INPATIENT)
Facility: HOSPITAL | Age: 63
LOS: 16 days | Discharge: HOME | DRG: 987 | End: 2023-06-25
Attending: EMERGENCY MEDICINE | Admitting: HOSPITALIST
Payer: COMMERCIAL

## 2023-06-09 DIAGNOSIS — G93.40 ACUTE ENCEPHALOPATHY: ICD-10-CM

## 2023-06-09 DIAGNOSIS — R73.9 HYPERGLYCEMIA: ICD-10-CM

## 2023-06-09 DIAGNOSIS — E87.5 HYPERKALEMIA: ICD-10-CM

## 2023-06-09 DIAGNOSIS — E11.22 TYPE 2 DIABETES MELLITUS WITH STAGE 3 CHRONIC KIDNEY DISEASE, WITH LONG-TERM CURRENT USE OF INSULIN, UNSPECIFIED WHETHER STAGE 3A OR 3B CKD: ICD-10-CM

## 2023-06-09 DIAGNOSIS — Z87.19 HISTORY OF PANCREATITIS: ICD-10-CM

## 2023-06-09 DIAGNOSIS — E11.00 HYPEROSMOLAR HYPERGLYCEMIC STATE (HHS): ICD-10-CM

## 2023-06-09 DIAGNOSIS — N18.30 TYPE 2 DIABETES MELLITUS WITH STAGE 3 CHRONIC KIDNEY DISEASE, WITH LONG-TERM CURRENT USE OF INSULIN, UNSPECIFIED WHETHER STAGE 3A OR 3B CKD: ICD-10-CM

## 2023-06-09 DIAGNOSIS — E80.6 HYPERBILIRUBINEMIA: ICD-10-CM

## 2023-06-09 DIAGNOSIS — E11.59 HYPERTENSION ASSOCIATED WITH DIABETES: Chronic | ICD-10-CM

## 2023-06-09 DIAGNOSIS — N17.9 AKI (ACUTE KIDNEY INJURY): ICD-10-CM

## 2023-06-09 DIAGNOSIS — Z86.718 HISTORY OF DVT (DEEP VEIN THROMBOSIS): ICD-10-CM

## 2023-06-09 DIAGNOSIS — K85.10 ACUTE BILIARY PANCREATITIS, UNSPECIFIED COMPLICATION STATUS: ICD-10-CM

## 2023-06-09 DIAGNOSIS — R07.9 CHEST PAIN: ICD-10-CM

## 2023-06-09 DIAGNOSIS — K85.90 ACUTE PANCREATITIS, UNSPECIFIED COMPLICATION STATUS, UNSPECIFIED PANCREATITIS TYPE: Primary | ICD-10-CM

## 2023-06-09 DIAGNOSIS — K85.90 ACUTE RECURRENT PANCREATITIS: ICD-10-CM

## 2023-06-09 DIAGNOSIS — E11.22 TYPE 2 DIABETES MELLITUS WITH STAGE 2 CHRONIC KIDNEY DISEASE AND HYPERTENSION: Chronic | ICD-10-CM

## 2023-06-09 DIAGNOSIS — Z79.4 TYPE 2 DIABETES MELLITUS WITH STAGE 3 CHRONIC KIDNEY DISEASE, WITH LONG-TERM CURRENT USE OF INSULIN, UNSPECIFIED WHETHER STAGE 3A OR 3B CKD: ICD-10-CM

## 2023-06-09 DIAGNOSIS — N18.2 TYPE 2 DIABETES MELLITUS WITH STAGE 2 CHRONIC KIDNEY DISEASE AND HYPERTENSION: Chronic | ICD-10-CM

## 2023-06-09 DIAGNOSIS — Z91.148 NON COMPLIANCE W MEDICATION REGIMEN: ICD-10-CM

## 2023-06-09 DIAGNOSIS — I12.9 TYPE 2 DIABETES MELLITUS WITH STAGE 2 CHRONIC KIDNEY DISEASE AND HYPERTENSION: Chronic | ICD-10-CM

## 2023-06-09 DIAGNOSIS — I15.2 HYPERTENSION ASSOCIATED WITH DIABETES: Chronic | ICD-10-CM

## 2023-06-09 DIAGNOSIS — K85.10 GALLSTONE PANCREATITIS: ICD-10-CM

## 2023-06-09 DIAGNOSIS — N17.0 ATN (ACUTE TUBULAR NECROSIS): ICD-10-CM

## 2023-06-09 DIAGNOSIS — N13.30 HYDRONEPHROSIS, UNSPECIFIED HYDRONEPHROSIS TYPE: ICD-10-CM

## 2023-06-09 PROBLEM — I73.89 HHS (HYPOTHENAR HAMMER SYNDROME): Status: ACTIVE | Noted: 2023-06-09

## 2023-06-09 LAB
ALBUMIN SERPL BCP-MCNC: 3.1 G/DL (ref 3.5–5.2)
ALLENS TEST: ABNORMAL
ALP SERPL-CCNC: 301 U/L (ref 55–135)
ALT SERPL W/O P-5'-P-CCNC: 32 U/L (ref 10–44)
ANION GAP SERPL CALC-SCNC: 12 MMOL/L (ref 8–16)
ANION GAP SERPL CALC-SCNC: 12 MMOL/L (ref 8–16)
AST SERPL-CCNC: 34 U/L (ref 10–40)
B-OH-BUTYR BLD STRIP-SCNC: 1.3 MMOL/L (ref 0–0.5)
BASOPHILS # BLD AUTO: 0.05 K/UL (ref 0–0.2)
BASOPHILS NFR BLD: 0.5 % (ref 0–1.9)
BILIRUB SERPL-MCNC: 1 MG/DL (ref 0.1–1)
BUN SERPL-MCNC: 20 MG/DL (ref 8–23)
BUN SERPL-MCNC: 23 MG/DL (ref 8–23)
CALCIUM SERPL-MCNC: 10.3 MG/DL (ref 8.7–10.5)
CALCIUM SERPL-MCNC: 10.4 MG/DL (ref 8.7–10.5)
CHLORIDE SERPL-SCNC: 87 MMOL/L (ref 95–110)
CHLORIDE SERPL-SCNC: 94 MMOL/L (ref 95–110)
CO2 SERPL-SCNC: 32 MMOL/L (ref 23–29)
CO2 SERPL-SCNC: 34 MMOL/L (ref 23–29)
CREAT SERPL-MCNC: 1.3 MG/DL (ref 0.5–1.4)
CREAT SERPL-MCNC: 1.7 MG/DL (ref 0.5–1.4)
DIFFERENTIAL METHOD: ABNORMAL
EOSINOPHIL # BLD AUTO: 0.3 K/UL (ref 0–0.5)
EOSINOPHIL NFR BLD: 3.4 % (ref 0–8)
ERYTHROCYTE [DISTWIDTH] IN BLOOD BY AUTOMATED COUNT: 12.9 % (ref 11.5–14.5)
EST. GFR  (NO RACE VARIABLE): 33.7 ML/MIN/1.73 M^2
EST. GFR  (NO RACE VARIABLE): 46.5 ML/MIN/1.73 M^2
ETHANOL SERPL-MCNC: <10 MG/DL
GLUCOSE SERPL-MCNC: 395 MG/DL (ref 70–110)
GLUCOSE SERPL-MCNC: 775 MG/DL (ref 70–110)
HCO3 UR-SCNC: 37.8 MMOL/L (ref 24–28)
HCT VFR BLD AUTO: 37.9 % (ref 37–48.5)
HGB BLD-MCNC: 11.7 G/DL (ref 12–16)
IMM GRANULOCYTES # BLD AUTO: 0.02 K/UL (ref 0–0.04)
IMM GRANULOCYTES NFR BLD AUTO: 0.2 % (ref 0–0.5)
LACTATE SERPL-SCNC: 1.6 MMOL/L (ref 0.5–2.2)
LIPASE SERPL-CCNC: 2024 U/L (ref 4–60)
LYMPHOCYTES # BLD AUTO: 1.4 K/UL (ref 1–4.8)
LYMPHOCYTES NFR BLD: 14.7 % (ref 18–48)
MCH RBC QN AUTO: 26.7 PG (ref 27–31)
MCHC RBC AUTO-ENTMCNC: 30.9 G/DL (ref 32–36)
MCV RBC AUTO: 86 FL (ref 82–98)
MONOCYTES # BLD AUTO: 0.7 K/UL (ref 0.3–1)
MONOCYTES NFR BLD: 8 % (ref 4–15)
NEUTROPHILS # BLD AUTO: 6.8 K/UL (ref 1.8–7.7)
NEUTROPHILS NFR BLD: 73.2 % (ref 38–73)
NRBC BLD-RTO: 0 /100 WBC
OSMOLALITY SERPL: 326 MOSM/KG (ref 275–295)
PCO2 BLDA: 59.4 MMHG (ref 35–45)
PH SMN: 7.41 [PH] (ref 7.35–7.45)
PLATELET # BLD AUTO: 354 K/UL (ref 150–450)
PMV BLD AUTO: 11.9 FL (ref 9.2–12.9)
PO2 BLDA: 15 MMHG (ref 40–60)
POC BE: 13 MMOL/L
POC SATURATED O2: 18 % (ref 95–100)
POC TCO2: 40 MMOL/L (ref 24–29)
POCT GLUCOSE: 207 MG/DL (ref 70–110)
POCT GLUCOSE: 373 MG/DL (ref 70–110)
POCT GLUCOSE: 387 MG/DL (ref 70–110)
POCT GLUCOSE: 412 MG/DL (ref 70–110)
POTASSIUM SERPL-SCNC: 4.2 MMOL/L (ref 3.5–5.1)
POTASSIUM SERPL-SCNC: 4.7 MMOL/L (ref 3.5–5.1)
PROT SERPL-MCNC: 8.6 G/DL (ref 6–8.4)
RBC # BLD AUTO: 4.39 M/UL (ref 4–5.4)
SAMPLE: ABNORMAL
SITE: ABNORMAL
SODIUM SERPL-SCNC: 133 MMOL/L (ref 136–145)
SODIUM SERPL-SCNC: 138 MMOL/L (ref 136–145)
WBC # BLD AUTO: 9.28 K/UL (ref 3.9–12.7)

## 2023-06-09 PROCEDURE — 96375 TX/PRO/DX INJ NEW DRUG ADDON: CPT

## 2023-06-09 PROCEDURE — 82077 ASSAY SPEC XCP UR&BREATH IA: CPT | Performed by: PHYSICIAN ASSISTANT

## 2023-06-09 PROCEDURE — 99285 EMERGENCY DEPT VISIT HI MDM: CPT | Mod: ,,, | Performed by: PHYSICIAN ASSISTANT

## 2023-06-09 PROCEDURE — 25000003 PHARM REV CODE 250: Performed by: PHYSICIAN ASSISTANT

## 2023-06-09 PROCEDURE — 20600001 HC STEP DOWN PRIVATE ROOM

## 2023-06-09 PROCEDURE — 82803 BLOOD GASES ANY COMBINATION: CPT

## 2023-06-09 PROCEDURE — 96361 HYDRATE IV INFUSION ADD-ON: CPT

## 2023-06-09 PROCEDURE — 99285 EMERGENCY DEPT VISIT HI MDM: CPT | Mod: 25

## 2023-06-09 PROCEDURE — 85025 COMPLETE CBC W/AUTO DIFF WBC: CPT | Performed by: PHYSICIAN ASSISTANT

## 2023-06-09 PROCEDURE — 63600175 PHARM REV CODE 636 W HCPCS: Performed by: FAMILY MEDICINE

## 2023-06-09 PROCEDURE — 25000003 PHARM REV CODE 250: Performed by: FAMILY MEDICINE

## 2023-06-09 PROCEDURE — 80048 BASIC METABOLIC PNL TOTAL CA: CPT | Mod: XB | Performed by: FAMILY MEDICINE

## 2023-06-09 PROCEDURE — 63600175 PHARM REV CODE 636 W HCPCS: Performed by: PHYSICIAN ASSISTANT

## 2023-06-09 PROCEDURE — 99223 1ST HOSP IP/OBS HIGH 75: CPT | Mod: ,,, | Performed by: FAMILY MEDICINE

## 2023-06-09 PROCEDURE — 82010 KETONE BODYS QUAN: CPT | Performed by: PHYSICIAN ASSISTANT

## 2023-06-09 PROCEDURE — 99900035 HC TECH TIME PER 15 MIN (STAT)

## 2023-06-09 PROCEDURE — 94761 N-INVAS EAR/PLS OXIMETRY MLT: CPT

## 2023-06-09 PROCEDURE — 83930 ASSAY OF BLOOD OSMOLALITY: CPT | Performed by: PHYSICIAN ASSISTANT

## 2023-06-09 PROCEDURE — 83690 ASSAY OF LIPASE: CPT | Performed by: PHYSICIAN ASSISTANT

## 2023-06-09 PROCEDURE — 99285 PR EMERGENCY DEPT VISIT,LEVEL V: ICD-10-PCS | Mod: ,,, | Performed by: PHYSICIAN ASSISTANT

## 2023-06-09 PROCEDURE — 83605 ASSAY OF LACTIC ACID: CPT | Performed by: FAMILY MEDICINE

## 2023-06-09 PROCEDURE — 99223 PR INITIAL HOSPITAL CARE,LEVL III: ICD-10-PCS | Mod: ,,, | Performed by: FAMILY MEDICINE

## 2023-06-09 PROCEDURE — 96374 THER/PROPH/DIAG INJ IV PUSH: CPT

## 2023-06-09 PROCEDURE — S5010 5% DEXTROSE AND 0.45% SALINE: HCPCS | Performed by: FAMILY MEDICINE

## 2023-06-09 PROCEDURE — 80053 COMPREHEN METABOLIC PANEL: CPT | Performed by: PHYSICIAN ASSISTANT

## 2023-06-09 RX ORDER — ALBUTEROL SULFATE 90 UG/1
2 AEROSOL, METERED RESPIRATORY (INHALATION) EVERY 6 HOURS PRN
Status: DISCONTINUED | OUTPATIENT
Start: 2023-06-09 | End: 2023-06-25 | Stop reason: HOSPADM

## 2023-06-09 RX ORDER — OXYCODONE HYDROCHLORIDE 10 MG/1
10 TABLET ORAL EVERY 8 HOURS PRN
Status: DISCONTINUED | OUTPATIENT
Start: 2023-06-09 | End: 2023-06-14

## 2023-06-09 RX ORDER — OXYCODONE HYDROCHLORIDE 5 MG/1
5 TABLET ORAL EVERY 8 HOURS PRN
Status: DISCONTINUED | OUTPATIENT
Start: 2023-06-09 | End: 2023-06-14

## 2023-06-09 RX ORDER — GABAPENTIN 300 MG/1
300 CAPSULE ORAL DAILY
Status: DISCONTINUED | OUTPATIENT
Start: 2023-06-10 | End: 2023-06-14

## 2023-06-09 RX ORDER — ATORVASTATIN CALCIUM 20 MG/1
40 TABLET, FILM COATED ORAL NIGHTLY
Status: DISCONTINUED | OUTPATIENT
Start: 2023-06-09 | End: 2023-06-12

## 2023-06-09 RX ORDER — DEXTROSE MONOHYDRATE AND SODIUM CHLORIDE 5; .45 G/100ML; G/100ML
125 INJECTION, SOLUTION INTRAVENOUS CONTINUOUS PRN
Status: DISCONTINUED | OUTPATIENT
Start: 2023-06-09 | End: 2023-06-10

## 2023-06-09 RX ORDER — PROCHLORPERAZINE EDISYLATE 5 MG/ML
5 INJECTION INTRAMUSCULAR; INTRAVENOUS EVERY 6 HOURS PRN
Status: DISCONTINUED | OUTPATIENT
Start: 2023-06-09 | End: 2023-06-25 | Stop reason: HOSPADM

## 2023-06-09 RX ORDER — ONDANSETRON 2 MG/ML
4 INJECTION INTRAMUSCULAR; INTRAVENOUS
Status: COMPLETED | OUTPATIENT
Start: 2023-06-09 | End: 2023-06-09

## 2023-06-09 RX ORDER — ESCITALOPRAM OXALATE 10 MG/1
10 TABLET ORAL DAILY
Status: DISCONTINUED | OUTPATIENT
Start: 2023-06-10 | End: 2023-06-14

## 2023-06-09 RX ORDER — ACETAMINOPHEN 325 MG/1
650 TABLET ORAL EVERY 4 HOURS PRN
Status: DISCONTINUED | OUTPATIENT
Start: 2023-06-09 | End: 2023-06-25 | Stop reason: HOSPADM

## 2023-06-09 RX ORDER — SODIUM CHLORIDE 0.9 % (FLUSH) 0.9 %
10 SYRINGE (ML) INJECTION
Status: DISCONTINUED | OUTPATIENT
Start: 2023-06-09 | End: 2023-06-25 | Stop reason: HOSPADM

## 2023-06-09 RX ORDER — ISOSORBIDE MONONITRATE 10 MG/1
10 TABLET ORAL DAILY
Status: DISCONTINUED | OUTPATIENT
Start: 2023-06-10 | End: 2023-06-10

## 2023-06-09 RX ORDER — ONDANSETRON 2 MG/ML
4 INJECTION INTRAMUSCULAR; INTRAVENOUS EVERY 6 HOURS PRN
Status: DISCONTINUED | OUTPATIENT
Start: 2023-06-09 | End: 2023-06-25 | Stop reason: HOSPADM

## 2023-06-09 RX ORDER — FAMOTIDINE 20 MG/1
20 TABLET, FILM COATED ORAL 2 TIMES DAILY
Status: DISCONTINUED | OUTPATIENT
Start: 2023-06-09 | End: 2023-06-11

## 2023-06-09 RX ORDER — GABAPENTIN 300 MG/1
600 CAPSULE ORAL NIGHTLY
Status: DISCONTINUED | OUTPATIENT
Start: 2023-06-09 | End: 2023-06-14

## 2023-06-09 RX ORDER — NAPROXEN SODIUM 220 MG/1
81 TABLET, FILM COATED ORAL DAILY
Status: DISCONTINUED | OUTPATIENT
Start: 2023-06-10 | End: 2023-06-13

## 2023-06-09 RX ORDER — SODIUM CHLORIDE 9 MG/ML
INJECTION, SOLUTION INTRAVENOUS CONTINUOUS
Status: DISCONTINUED | OUTPATIENT
Start: 2023-06-09 | End: 2023-06-09

## 2023-06-09 RX ORDER — AMLODIPINE BESYLATE 10 MG/1
10 TABLET ORAL DAILY
Status: DISCONTINUED | OUTPATIENT
Start: 2023-06-10 | End: 2023-06-12

## 2023-06-09 RX ORDER — CILOSTAZOL 100 MG/1
100 TABLET ORAL 2 TIMES DAILY
Status: DISCONTINUED | OUTPATIENT
Start: 2023-06-09 | End: 2023-06-14

## 2023-06-09 RX ORDER — METOPROLOL TARTRATE 50 MG/1
50 TABLET ORAL 2 TIMES DAILY
Status: DISCONTINUED | OUTPATIENT
Start: 2023-06-09 | End: 2023-06-14

## 2023-06-09 RX ORDER — TALC
6 POWDER (GRAM) TOPICAL NIGHTLY PRN
Status: DISCONTINUED | OUTPATIENT
Start: 2023-06-09 | End: 2023-06-25 | Stop reason: HOSPADM

## 2023-06-09 RX ORDER — HYDRALAZINE HYDROCHLORIDE 25 MG/1
25 TABLET, FILM COATED ORAL EVERY 8 HOURS PRN
Status: DISCONTINUED | OUTPATIENT
Start: 2023-06-09 | End: 2023-06-14

## 2023-06-09 RX ORDER — SODIUM CHLORIDE 9 MG/ML
125 INJECTION, SOLUTION INTRAVENOUS CONTINUOUS
Status: DISCONTINUED | OUTPATIENT
Start: 2023-06-09 | End: 2023-06-10

## 2023-06-09 RX ADMIN — INSULIN HUMAN 0.1 UNITS/KG/HR: 1 INJECTION, SOLUTION INTRAVENOUS at 08:06

## 2023-06-09 RX ADMIN — SODIUM CHLORIDE 1000 ML: 9 INJECTION, SOLUTION INTRAVENOUS at 03:06

## 2023-06-09 RX ADMIN — ONDANSETRON 4 MG: 2 INJECTION INTRAMUSCULAR; INTRAVENOUS at 09:06

## 2023-06-09 RX ADMIN — INSULIN HUMAN 5 UNITS: 100 INJECTION, SOLUTION PARENTERAL at 05:06

## 2023-06-09 RX ADMIN — ONDANSETRON 4 MG: 2 INJECTION INTRAMUSCULAR; INTRAVENOUS at 03:06

## 2023-06-09 RX ADMIN — DEXTROSE AND SODIUM CHLORIDE 125 ML/HR: 5; 450 INJECTION, SOLUTION INTRAVENOUS at 11:06

## 2023-06-09 RX ADMIN — PROCHLORPERAZINE EDISYLATE 5 MG: 5 INJECTION INTRAMUSCULAR; INTRAVENOUS at 11:06

## 2023-06-09 NOTE — Clinical Note
Diagnosis: Acute pancreatitis, unspecified complication status, unspecified pancreatitis type [[id number]]   Future Attending Provider: JEN KIM [[id number]]   Admitting Provider:: JEN KIM [[id number]]

## 2023-06-09 NOTE — ED TRIAGE NOTES
Pt is a 63 yr old female arrived to er with c/o nausea x 2weeks. Pt reports seen 2 weeks ago for same symptoms and discharged but remained nauseated and vomiting. Pt states hx of pancreatitis and that she cant keep anything in her stomach, states vomits immediately after eating/drinking. Pt denies chest pain, abd pain, fever chills. Pt reports normal urination but stools are soft and unable to have complete bowel movement. Pt denies blood in emesis/stool

## 2023-06-09 NOTE — PROVIDER PROGRESS NOTES - EMERGENCY DEPT.
Encounter Date: 6/9/2023    ED Physician Progress Notes          Patient signed out to me by my colleague with instructions to follow-up pending work-up. Please see main ED note for previous ED stay documentation. Patient signed out to me with labs pending.    Glucose significantly elevated 775. Bicarb slightly elevated, AG normal, VBG pH normal. Do not suspect DKA. Serum osmol 326, possible HHS component. Lipase elevated 2024 which appears to be her baseline, but concerning for ongoing pancreatitis. Patient received IVF and Insulin IV dose in the ED. Discussed with HM, placed in observation for ongoing management.       ED Diagnosis:  Final diagnoses:  [R73.9] Hyperglycemia  [K85.90] Acute pancreatitis, unspecified complication status, unspecified pancreatitis type (Primary)  [Z91.148] Non compliance w medication regimen    ED Disposition:   ED Disposition Condition    Observation

## 2023-06-09 NOTE — ED PROVIDER NOTES
Encounter Date: 6/9/2023       History     Chief Complaint   Patient presents with    Nausea     Pt reports nausea x2 weeks. Pt seen for same 2 weeks ago and discharged. Pt states she has remained nauseated.      63F with PMH of CAD with GINGER , HTN, HLD, DM2, MURTAZA (not on CPAP), PAD presents to the ED with nausea and vomiting.  Patient had a recent admission for pancreatitis.  She states that she was feeling okay at discharge (5/26) but developed nausea with vomiting a few days after discharge.  Patient has a prescription for Phenergan, but states that she has not been able to tolerate the pill.  She was able to tolerate a small amount of broth and Pedialyte yesterday.  She denies diarrhea, abdominal pain, fever, headache, chest pain, shortness of breath or other acute complaints.    Review of patient's allergies indicates:   Allergen Reactions    Milk containing products (dairy)      Causes GI distress     Past Medical History:   Diagnosis Date    Anticoagulant long-term use     Asthma     Back pain     Bradycardia, unspecified 5/8/2019    The etiology of the bradycardic episode is unclear.  The have appear to be respiratory in origin (at least the 1st episode).  We will continue to monitor carefully.  We are awaiting evaluation by Cardiology.      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;  Service: Urology;  Laterality: Left;    BRONCHOSCOPY N/A 2019    Procedure: BRONCHOSCOPY;  Surgeon: Sean Ruano MD;  [facility];  Service: General;  Laterality: N/A;    CARDIAC CATHETERIZATION      CATARACT EXTRACTION      cataract extraction left eye      cataracts      CAUDAL EPIDURAL STEROID INJECTION N/A 2019    Procedure: Injection-steroid-epidural-caudal;  Surgeon: Dave Bentley Jr., MD;  Location: [facility] PAIN MGT;  Service: Pain Management;  Laterality: N/A;     SECTION, LOW TRANSVERSE      COLONOSCOPY N/A 2019    Procedure: COLONOSCOPY;  Surgeon: Al Alaniz MD;  Location: [facility] ENDO (2ND FLR);  Service: Endoscopy;  Laterality: N/A;    CORONARY ANGIOPLASTY      CYSTOGRAM N/A 2019    Procedure: CYSTOGRAM INTRAOP;  Surgeon: Robin Boyd MD;  [facility];  Service: Urology;  Laterality: N/A;  1 HOUR    CYSTOSCOPY W/ RETROGRADES Left 2019    Procedure: CYSTOSCOPY, WITH RETROGRADE PYELOGRAM;  Surgeon: Robin Boyd MD;  [facility];  Service: Urology;  Laterality: Left;  fluro    ESOPHAGOGASTRODUODENOSCOPY W/ PEG  2019    Procedure: EGD, WITH PEG TUBE INSERTION;  Surgeon: Sean Ruano MD;  [facility];  Service: General;;    EXCISION TURBINATE, SUBMUCOUS      FLEXIBLE SIGMOIDOSCOPY N/A 2019    Procedure: SIGMOIDOSCOPY, FLEXIBLE;  Surgeon: ALBERTO Amin MD;  Location: [facility] ENDO (2ND FLR);  Service: Endoscopy;  Laterality: N/A;    FLEXIBLE SIGMOIDOSCOPY N/A 2019    Procedure: SIGMOIDOSCOPY, FLEXIBLE;  Surgeon: ALBERTO Amin MD;  Location: [facility] ENDO (2ND FLR);  Service: Endoscopy;  Laterality: N/A;    FUSION OF LUMBAR SPINE BY ANTERIOR APPROACH Left 2019    Procedure: FUSION, SPINE, LUMBAR, ANTERIOR  APPROACH Left L5-S1 Anterior to Psoa Interbody Fusion, L5-S1 Posterior Instrumentation;  Surgeon: Mk George MD;  [address]  Service: Neurosurgery;  Laterality: Left;  Porcedure:  Left L5-S1 Anterior to Psoa Interbody Fusion, L5-S1 Posterior Instrumentation  Surgery Time: 4 Hrs  LOS: 2-3  Anesthesia: General   Blood: Type & Screen  R    HAND SURGERY Left     HAND SURGERY Right     torn ligament repair/ Dr. Yeboah    HYSTERECTOMY      INJECTION OF STEROID Right 12/10/2020    Procedure: INJECTION, STEROID Right SI Joint Block and Steroid Injection;  Surgeon: Mk George MD;  Location: [facility] OR;  Service: Neurosurgery;  Laterality: Right;  Procedure: Right SI Joint Block and Steroid Injection   SUrgery Time: 30 Min  LOS: 0  Anesthesia: MAC  Radiology: C-arm  Bed: [place] [id number] Poster  Position: Prone    INJECTION OF STEROID Right 9/28/2021    Procedure: INJECTION, STEROID Right SI joint block & steroid injection;  Surgeon: Mk George MD;  Location: [facility];  Service: Neurosurgery;  Laterality: Right;  Procedure: Right SI joint block & steroid injection  Surgery Time: 30m  Anesthesia: Local MAC  Radiology: C-arm  Bed: Regular  Position: Prone    left foot surgery      left wrist surgery      LYSIS OF ADHESIONS N/A 2/19/2020    Procedure: LYSIS, ADHESIONS;  Surgeon: Robin Boyd MD;  Location: [address];  Service: Urology;  Laterality: N/A;    NASAL SEPTUM SURGERY  5/7/15    PERCUTANEOUS NEPHROSTOMY Left 4/21/2019    Procedure: Creation, Nephrostomy, Percutaneous;  Surgeon: Karina Surgeon;  Location: [facility];  Service: Anesthesiology;  Laterality: Left;    REPAIR OF URETER  4/12/2019    Procedure: REPAIR, URETER;  Surgeon: Mk George MD;  Location: [facility] OR [address] [place] [address];  Service: Neurosurgery;;    REPLACEMENT OF NEPHROSTOMY TUBE N/A 7/18/2019    Procedure: REPLACEMENT, NEPHROSTOMY TUBE;  Surgeon: Leo Diagnostic Provider;  Location: [address];  Service: Anesthesiology;   Laterality: N/A;  188    REPLACEMENT OF NEPHROSTOMY TUBE N/A 7/24/2019    Procedure: REPLACEMENT, NEPHROSTOMY TUBE;  Surgeon: [facility] Diagnostic Provider;  Location: [facility] OR 2ND FLR;  Service: Anesthesiology;  Laterality: N/A;  188    REPLACEMENT OF NEPHROSTOMY TUBE N/A 10/7/2019    Procedure: REPLACEMENT, NEPHROSTOMY TUBE;  Surgeon: [facility] Diagnostic Provider;  [facility];  Service: Anesthesiology;  Laterality: N/A;  189    REPLACEMENT OF NEPHROSTOMY TUBE N/A 11/25/2019    Procedure: REPLACEMENT, NEPHROSTOMY TUBE;  Surgeon: [facility] Diagnostic Provider;  [facility];  Service: Anesthesiology;  Laterality: N/A;  Room 188/Bessy    REPLACEMENT OF NEPHROSTOMY TUBE Right 2/19/2020    Procedure: REPLACEMENT, NEPHROSTOMY TUBE removal removal;  Surgeon: Robin Boyd MD;  Location: [facility] OR [address] [place] [address];  Service: Urology;  Laterality: Right;    rt elbow surgery      S/P LAD COATED STENT  05/14/2010    6 total     S/P OM1 STENT  08/2003    SINUS SURGERY      F.E.S.S.    TRACHEOSTOMY N/A 5/2/2019    Procedure: CREATION, TRACHEOSTOMY;  Surgeon: Sean Ruano MD;  Location: [facility] OR [address] [place] [address];  Service: General;  Laterality: N/A;    TUBAL LIGATION      URETERAL REIMPLANTION Left 2/19/2020    Procedure: REIMPLANTATION, URETER WITH PSOAS HITCH;  Surgeon: Robin Boyd MD;  Location: [facility] OR [address] [place] [address];  Service: Urology;  Laterality: Left;     Family History   Problem Relation Age of Onset    Diabetes Mother     Heart disease Mother     Diabetes Father     Leukemia Father         leukemia    Heart attack Father     Diabetes Sister     Diabetes Brother     Diabetes Sister     No Known Problems Sister     No Known Problems Brother     No Known Problems Brother     No Known Problems Maternal Grandmother     No Known Problems Maternal Grandfather     No Known Problems Paternal Grandmother     No Known Problems Paternal Grandfather     No Known Problems Son     No Known Problems Son     No Known Problems Maternal Aunt      No Known Problems Maternal Uncle     No Known Problems Paternal Aunt     No Known Problems Paternal Uncle     Colon cancer Neg Hx     Inflammatory bowel disease Neg Hx     Melanoma Neg Hx     Psoriasis Neg Hx     Lupus Neg Hx     Eczema Neg Hx     Acne Neg Hx     Amblyopia Neg Hx     Blindness Neg Hx     Cancer Neg Hx     Cataracts Neg Hx     Glaucoma Neg Hx     Hypertension Neg Hx     Macular degeneration Neg Hx     Retinal detachment Neg Hx     Strabismus Neg Hx     Stroke Neg Hx     Thyroid disease Neg Hx     Heart failure Neg Hx     Hyperlipidemia Neg Hx      Social History     Tobacco Use    Smoking status: Never    Smokeless tobacco: Never   Substance Use Topics    Alcohol use: No     Alcohol/week: 0.0 standard drinks    Drug use: No     Review of Systems   Constitutional:  Negative for fever.   Respiratory:  Negative for shortness of breath.    Gastrointestinal:  Positive for nausea and vomiting. Negative for abdominal pain and diarrhea.     Physical Exam     Initial Vitals   BP Pulse Resp Temp SpO2   06/09/23 1335 06/09/23 1335 06/09/23 1335 06/09/23 1450 06/09/23 1335   (!) 140/73 107 20 (S) 98.4 °F (36.9 °C) 99 %      MAP       --                Physical Exam    Nursing note and vitals reviewed.  Constitutional: She appears well-developed and well-nourished. She is not diaphoretic.  Non-toxic appearance. She appears ill. No distress.   HENT:   Head: Normocephalic and atraumatic.   Neck: Neck supple.   Cardiovascular:  Regular rhythm.   Tachycardia present.   Exam reveals no gallop and no friction rub.       No murmur heard.  Pulmonary/Chest: Effort normal and breath sounds normal. No accessory muscle usage. No tachypnea. No respiratory distress. She has no decreased breath sounds. She has no wheezes. She has no rhonchi. She has no rales.   Abdominal: Abdomen is soft. She exhibits no distension and no mass. There is no abdominal tenderness. There is no guarding.   Musculoskeletal:      Cervical back:  Neck supple.     Neurological: She is alert.   Skin: No rash noted.   Psychiatric: She has a normal mood and affect. Her behavior is normal.       ED Course   Procedures  Labs Reviewed   CBC W/ AUTO DIFFERENTIAL - Abnormal; Notable for the following components:       Result Value    Hemoglobin 11.7 (*)     MCH 26.7 (*)     MCHC 30.9 (*)     Gran % 73.2 (*)     Lymph % 14.7 (*)     All other components within normal limits   COMPREHENSIVE METABOLIC PANEL - Abnormal; Notable for the following components:    Sodium 133 (*)     Chloride 87 (*)     CO2 34 (*)     Glucose 775 (*)     Creatinine 1.7 (*)     Total Protein 8.6 (*)     Albumin 3.1 (*)     Alkaline Phosphatase 301 (*)     eGFR 33.7 (*)     All other components within normal limits   LIPASE - Abnormal; Notable for the following components:    Lipase 2024 (*)     All other components within normal limits   BETA - HYDROXYBUTYRATE, SERUM - Abnormal; Notable for the following components:    Beta-Hydroxybutyrate 1.3 (*)     All other components within normal limits   ISTAT PROCEDURE - Abnormal; Notable for the following components:    POC PCO2 59.4 (*)     POC PO2 15 (*)     POC HCO3 37.8 (*)     POC SATURATED O2 18 (*)     POC TCO2 40 (*)     All other components within normal limits   ALCOHOL,MEDICAL (ETHANOL)   URINALYSIS, REFLEX TO URINE CULTURE   DRUG SCREEN PANEL, URINE EMERGENCY   OSMOLALITY, SERUM   OSMOLALITY, URINE RANDOM   POCT GLUCOSE MONITORING CONTINUOUS   POCT GLUCOSE MONITORING CONTINUOUS   POCT GLUCOSE MONITORING CONTINUOUS   POCT GLUCOSE MONITORING CONTINUOUS   POCT GLUCOSE MONITORING CONTINUOUS          Imaging Results              X-Ray Chest AP Portable (Final result)  Result time 06/09/23 17:15:32      Final result by Med Navas MD (06/09/23 17:15:32)                   Impression:      No acute process.      Electronically signed by: Med Navas MD  Date:    06/09/2023  Time:    17:15               Narrative:    EXAMINATION:  XR CHEST AP  PORTABLE    CLINICAL HISTORY:  Hyperglycemia, unspecified    TECHNIQUE:  Single frontal view of the chest was performed.    COMPARISON:  10/20/2019.    FINDINGS:  The trachea is unremarkable.  There are calcifications of the aortic knob.  The cardiomediastinal silhouette is within normal limits.  There is a left-sided coronary artery stent in place.  The hemidiaphragms are unremarkable.  There is no evidence of free air the hemidiaphragms.  There are no pleural effusions.  There is no evidence of a pneumothorax.  There is no evidence of pneumomediastinum.  No airspace opacity is present.  The osseous structures are unremarkable.                                       Medications   sodium chloride 0.9% bolus 1,000 mL 1,000 mL (0 mLs Intravenous Stopped 6/9/23 1746)   ondansetron injection 4 mg (4 mg Intravenous Given 6/9/23 1546)   insulin regular injection 5 Units 0.05 mL (5 Units Intravenous Given by Other 6/9/23 1742)     Medical Decision Making:   History:   Old Medical Records: I decided to obtain old medical records.  Initial Assessment:   63-year-old female with a recent admission for pancreatitis presents to the ED with nausea and vomiting for the past several days.  She has mild tachycardia on initial evaluation.  Hypertensive.  Afebrile.  She appears unwell but is is in no acute distress.  Abdomen is soft and nontender to palpation.  Differential Diagnosis:   My differential diagnosis includes but is not limited to:  Dehydration, PAPA, electrolyte abnormality, pancreatitis   Clinical Tests:   Lab Tests: Ordered  ED Management:  CBC with baseline mild anemia.  No leukocytosis.  Additional labs pending at shift end.  Patient was given IV fluids and Zofran for her symptoms.  Patient signed out to oncoming PA pending additional lab results, patient response to treatment and until a final disposition is reached.                        Clinical Impression:   Final diagnoses:  [R73.9] Hyperglycemia  [K85.90] Acute  pancreatitis, unspecified complication status, unspecified pancreatitis type (Primary)  [Z91.148] Non compliance w medication regimen        ED Disposition Condition    Admit Stable                Sharon Quarles PA-C  06/09/23 5585

## 2023-06-10 PROBLEM — E11.00 TYPE 2 DIABETES MELLITUS WITH HYPEROSMOLAR HYPERGLYCEMIC STATE (HHS): Status: ACTIVE | Noted: 2023-06-09

## 2023-06-10 PROBLEM — Z87.19 HISTORY OF PANCREATITIS: Status: ACTIVE | Noted: 2023-06-10

## 2023-06-10 LAB
ANION GAP SERPL CALC-SCNC: 11 MMOL/L (ref 8–16)
ANION GAP SERPL CALC-SCNC: 16 MMOL/L (ref 8–16)
BASOPHILS # BLD AUTO: 0.06 K/UL (ref 0–0.2)
BASOPHILS NFR BLD: 0.6 % (ref 0–1.9)
BUN SERPL-MCNC: 20 MG/DL (ref 8–23)
BUN SERPL-MCNC: 21 MG/DL (ref 8–23)
CALCIUM SERPL-MCNC: 10.4 MG/DL (ref 8.7–10.5)
CALCIUM SERPL-MCNC: 11 MG/DL (ref 8.7–10.5)
CHLORIDE SERPL-SCNC: 100 MMOL/L (ref 95–110)
CHLORIDE SERPL-SCNC: 102 MMOL/L (ref 95–110)
CO2 SERPL-SCNC: 31 MMOL/L (ref 23–29)
CO2 SERPL-SCNC: 31 MMOL/L (ref 23–29)
CREAT SERPL-MCNC: 1.4 MG/DL (ref 0.5–1.4)
CREAT SERPL-MCNC: 1.4 MG/DL (ref 0.5–1.4)
DIFFERENTIAL METHOD: ABNORMAL
EOSINOPHIL # BLD AUTO: 0.4 K/UL (ref 0–0.5)
EOSINOPHIL NFR BLD: 3.9 % (ref 0–8)
ERYTHROCYTE [DISTWIDTH] IN BLOOD BY AUTOMATED COUNT: 12.8 % (ref 11.5–14.5)
EST. GFR  (NO RACE VARIABLE): 42.5 ML/MIN/1.73 M^2
EST. GFR  (NO RACE VARIABLE): 42.5 ML/MIN/1.73 M^2
GLUCOSE SERPL-MCNC: 155 MG/DL (ref 70–110)
GLUCOSE SERPL-MCNC: 214 MG/DL (ref 70–110)
HCT VFR BLD AUTO: 44.4 % (ref 37–48.5)
HGB BLD-MCNC: 13.4 G/DL (ref 12–16)
IMM GRANULOCYTES # BLD AUTO: 0.02 K/UL (ref 0–0.04)
IMM GRANULOCYTES NFR BLD AUTO: 0.2 % (ref 0–0.5)
LIPASE SERPL-CCNC: 1681 U/L (ref 4–60)
LYMPHOCYTES # BLD AUTO: 1.6 K/UL (ref 1–4.8)
LYMPHOCYTES NFR BLD: 16.6 % (ref 18–48)
MAGNESIUM SERPL-MCNC: 3 MG/DL (ref 1.6–2.6)
MCH RBC QN AUTO: 26.5 PG (ref 27–31)
MCHC RBC AUTO-ENTMCNC: 30.2 G/DL (ref 32–36)
MCV RBC AUTO: 88 FL (ref 82–98)
MONOCYTES # BLD AUTO: 1.1 K/UL (ref 0.3–1)
MONOCYTES NFR BLD: 11.5 % (ref 4–15)
NEUTROPHILS # BLD AUTO: 6.5 K/UL (ref 1.8–7.7)
NEUTROPHILS NFR BLD: 67.2 % (ref 38–73)
NRBC BLD-RTO: 0 /100 WBC
PHOSPHATE SERPL-MCNC: 2.2 MG/DL (ref 2.7–4.5)
PLATELET # BLD AUTO: 360 K/UL (ref 150–450)
PMV BLD AUTO: 11.6 FL (ref 9.2–12.9)
POCT GLUCOSE: 135 MG/DL (ref 70–110)
POCT GLUCOSE: 139 MG/DL (ref 70–110)
POCT GLUCOSE: 144 MG/DL (ref 70–110)
POCT GLUCOSE: 149 MG/DL (ref 70–110)
POCT GLUCOSE: 158 MG/DL (ref 70–110)
POCT GLUCOSE: 176 MG/DL (ref 70–110)
POCT GLUCOSE: 180 MG/DL (ref 70–110)
POCT GLUCOSE: 191 MG/DL (ref 70–110)
POCT GLUCOSE: 191 MG/DL (ref 70–110)
POCT GLUCOSE: 208 MG/DL (ref 70–110)
POCT GLUCOSE: 219 MG/DL (ref 70–110)
POCT GLUCOSE: 229 MG/DL (ref 70–110)
POCT GLUCOSE: 243 MG/DL (ref 70–110)
POCT GLUCOSE: 248 MG/DL (ref 70–110)
POCT GLUCOSE: 259 MG/DL (ref 70–110)
POCT GLUCOSE: 262 MG/DL (ref 70–110)
POCT GLUCOSE: 262 MG/DL (ref 70–110)
POTASSIUM SERPL-SCNC: 3.9 MMOL/L (ref 3.5–5.1)
POTASSIUM SERPL-SCNC: 4 MMOL/L (ref 3.5–5.1)
RBC # BLD AUTO: 5.06 M/UL (ref 4–5.4)
SODIUM SERPL-SCNC: 144 MMOL/L (ref 136–145)
SODIUM SERPL-SCNC: 147 MMOL/L (ref 136–145)
TRIGL SERPL-MCNC: 157 MG/DL (ref 30–150)
WBC # BLD AUTO: 9.59 K/UL (ref 3.9–12.7)

## 2023-06-10 PROCEDURE — 25000003 PHARM REV CODE 250: Performed by: FAMILY MEDICINE

## 2023-06-10 PROCEDURE — 63600175 PHARM REV CODE 636 W HCPCS: Performed by: FAMILY MEDICINE

## 2023-06-10 PROCEDURE — S5010 5% DEXTROSE AND 0.45% SALINE: HCPCS | Performed by: FAMILY MEDICINE

## 2023-06-10 PROCEDURE — 80048 BASIC METABOLIC PNL TOTAL CA: CPT | Mod: 91 | Performed by: FAMILY MEDICINE

## 2023-06-10 PROCEDURE — 83735 ASSAY OF MAGNESIUM: CPT | Performed by: FAMILY MEDICINE

## 2023-06-10 PROCEDURE — 36415 COLL VENOUS BLD VENIPUNCTURE: CPT | Performed by: STUDENT IN AN ORGANIZED HEALTH CARE EDUCATION/TRAINING PROGRAM

## 2023-06-10 PROCEDURE — 99233 PR SUBSEQUENT HOSPITAL CARE,LEVL III: ICD-10-PCS | Mod: ,,, | Performed by: STUDENT IN AN ORGANIZED HEALTH CARE EDUCATION/TRAINING PROGRAM

## 2023-06-10 PROCEDURE — 80048 BASIC METABOLIC PNL TOTAL CA: CPT | Performed by: FAMILY MEDICINE

## 2023-06-10 PROCEDURE — 63600175 PHARM REV CODE 636 W HCPCS: Performed by: STUDENT IN AN ORGANIZED HEALTH CARE EDUCATION/TRAINING PROGRAM

## 2023-06-10 PROCEDURE — 20600001 HC STEP DOWN PRIVATE ROOM

## 2023-06-10 PROCEDURE — 99222 PR INITIAL HOSPITAL CARE,LEVL II: ICD-10-PCS | Mod: ,,, | Performed by: GENERAL ACUTE CARE HOSPITAL

## 2023-06-10 PROCEDURE — 25000003 PHARM REV CODE 250: Performed by: STUDENT IN AN ORGANIZED HEALTH CARE EDUCATION/TRAINING PROGRAM

## 2023-06-10 PROCEDURE — 83690 ASSAY OF LIPASE: CPT | Performed by: FAMILY MEDICINE

## 2023-06-10 PROCEDURE — 84100 ASSAY OF PHOSPHORUS: CPT | Performed by: FAMILY MEDICINE

## 2023-06-10 PROCEDURE — 36415 COLL VENOUS BLD VENIPUNCTURE: CPT | Performed by: FAMILY MEDICINE

## 2023-06-10 PROCEDURE — 85025 COMPLETE CBC W/AUTO DIFF WBC: CPT | Performed by: FAMILY MEDICINE

## 2023-06-10 PROCEDURE — 99233 SBSQ HOSP IP/OBS HIGH 50: CPT | Mod: ,,, | Performed by: STUDENT IN AN ORGANIZED HEALTH CARE EDUCATION/TRAINING PROGRAM

## 2023-06-10 PROCEDURE — 99222 1ST HOSP IP/OBS MODERATE 55: CPT | Mod: ,,, | Performed by: GENERAL ACUTE CARE HOSPITAL

## 2023-06-10 PROCEDURE — 84478 ASSAY OF TRIGLYCERIDES: CPT | Performed by: STUDENT IN AN ORGANIZED HEALTH CARE EDUCATION/TRAINING PROGRAM

## 2023-06-10 RX ORDER — DEXTROSE 40 %
30 GEL (GRAM) ORAL
Status: DISCONTINUED | OUTPATIENT
Start: 2023-06-10 | End: 2023-06-25 | Stop reason: HOSPADM

## 2023-06-10 RX ORDER — INSULIN ASPART 100 [IU]/ML
0-5 INJECTION, SOLUTION INTRAVENOUS; SUBCUTANEOUS
Status: DISCONTINUED | OUTPATIENT
Start: 2023-06-10 | End: 2023-06-23

## 2023-06-10 RX ORDER — ISOSORBIDE MONONITRATE 20 MG/1
20 TABLET ORAL DAILY
Status: DISCONTINUED | OUTPATIENT
Start: 2023-06-10 | End: 2023-06-14

## 2023-06-10 RX ORDER — INSULIN ASPART 100 [IU]/ML
3 INJECTION, SOLUTION INTRAVENOUS; SUBCUTANEOUS
Status: DISCONTINUED | OUTPATIENT
Start: 2023-06-10 | End: 2023-06-11

## 2023-06-10 RX ORDER — GLUCAGON 1 MG
1 KIT INJECTION
Status: DISCONTINUED | OUTPATIENT
Start: 2023-06-10 | End: 2023-06-25 | Stop reason: HOSPADM

## 2023-06-10 RX ORDER — DEXTROSE 40 %
15 GEL (GRAM) ORAL
Status: DISCONTINUED | OUTPATIENT
Start: 2023-06-10 | End: 2023-06-25 | Stop reason: HOSPADM

## 2023-06-10 RX ADMIN — FAMOTIDINE 20 MG: 20 TABLET, FILM COATED ORAL at 08:06

## 2023-06-10 RX ADMIN — ONDANSETRON 4 MG: 2 INJECTION INTRAMUSCULAR; INTRAVENOUS at 05:06

## 2023-06-10 RX ADMIN — SODIUM CHLORIDE 1000 ML: 9 INJECTION, SOLUTION INTRAVENOUS at 01:06

## 2023-06-10 RX ADMIN — ASPIRIN 81 MG 81 MG: 81 TABLET ORAL at 08:06

## 2023-06-10 RX ADMIN — METOPROLOL TARTRATE 50 MG: 50 TABLET, FILM COATED ORAL at 08:06

## 2023-06-10 RX ADMIN — PROCHLORPERAZINE EDISYLATE 5 MG: 5 INJECTION INTRAMUSCULAR; INTRAVENOUS at 08:06

## 2023-06-10 RX ADMIN — CILOSTAZOL 100 MG: 100 TABLET ORAL at 08:06

## 2023-06-10 RX ADMIN — CILOSTAZOL 100 MG: 100 TABLET ORAL at 09:06

## 2023-06-10 RX ADMIN — AMLODIPINE BESYLATE 10 MG: 10 TABLET ORAL at 08:06

## 2023-06-10 RX ADMIN — APIXABAN 2.5 MG: 2.5 TABLET, FILM COATED ORAL at 08:06

## 2023-06-10 RX ADMIN — INSULIN DETEMIR 14 UNITS: 100 INJECTION, SOLUTION SUBCUTANEOUS at 08:06

## 2023-06-10 RX ADMIN — INSULIN ASPART 2 UNITS: 100 INJECTION, SOLUTION INTRAVENOUS; SUBCUTANEOUS at 11:06

## 2023-06-10 RX ADMIN — GABAPENTIN 600 MG: 300 CAPSULE ORAL at 08:06

## 2023-06-10 RX ADMIN — INSULIN ASPART 3 UNITS: 100 INJECTION, SOLUTION INTRAVENOUS; SUBCUTANEOUS at 01:06

## 2023-06-10 RX ADMIN — INSULIN ASPART 3 UNITS: 100 INJECTION, SOLUTION INTRAVENOUS; SUBCUTANEOUS at 04:06

## 2023-06-10 RX ADMIN — HYDRALAZINE HYDROCHLORIDE 25 MG: 25 TABLET, FILM COATED ORAL at 04:06

## 2023-06-10 RX ADMIN — DEXTROSE AND SODIUM CHLORIDE 125 ML/HR: 5; 450 INJECTION, SOLUTION INTRAVENOUS at 07:06

## 2023-06-10 RX ADMIN — GABAPENTIN 300 MG: 300 CAPSULE ORAL at 08:06

## 2023-06-10 RX ADMIN — ATORVASTATIN CALCIUM 40 MG: 20 TABLET, FILM COATED ORAL at 08:06

## 2023-06-10 NOTE — SUBJECTIVE & OBJECTIVE
Interval History: No acute events overnight. Patient reports feeling approximately 89% back to normal. Denies any abdominal pain. Reports nausea has subsided. Patient to be transitioned to non-hhs insulin drip and start clears.    Review of Systems   Constitutional:  Negative for chills and fever.   HENT:  Negative for congestion and sore throat.    Eyes:  Negative for photophobia and visual disturbance.   Respiratory:  Negative for cough and shortness of breath.    Cardiovascular:  Negative for chest pain and palpitations.   Gastrointestinal:  Negative for abdominal pain, constipation, diarrhea, nausea and vomiting.   Endocrine: Negative for cold intolerance and heat intolerance.   Genitourinary:  Negative for dysuria and hematuria.   Musculoskeletal:  Negative for arthralgias and myalgias.   Skin:  Negative for rash.   Allergic/Immunologic: Negative for environmental allergies and food allergies.   Neurological:  Negative for dizziness, seizures, syncope and headaches.   Hematological:  Negative for adenopathy. Does not bruise/bleed easily.   Psychiatric/Behavioral:  Negative for hallucinations and suicidal ideas.    Objective:     Vital Signs (Most Recent):  Temp: 97.9 °F (36.6 °C) (06/10/23 1138)  Pulse: 61 (06/10/23 1138)  Resp: 18 (06/10/23 1138)  BP: 122/66 (06/10/23 1138)  SpO2: 99 % (06/10/23 1138) Vital Signs (24h Range):  Temp:  [97.1 °F (36.2 °C)-98.9 °F (37.2 °C)] 97.9 °F (36.6 °C)  Pulse:  [] 61  Resp:  [18-20] 18  SpO2:  [93 %-100 %] 99 %  BP: (121-187)/() 122/66     Weight: 62.8 kg (138 lb 7.2 oz)  Body mass index is 23.76 kg/m².    Intake/Output Summary (Last 24 hours) at 6/10/2023 1327  Last data filed at 6/9/2023 1746  Gross per 24 hour   Intake 1000 ml   Output --   Net 1000 ml         Physical Exam  Constitutional:       Appearance: She is well-developed.   HENT:      Head: Normocephalic and atraumatic.   Eyes:      Conjunctiva/sclera: Conjunctivae normal.      Pupils: Pupils are  equal, round, and reactive to light.   Neck:      Thyroid: No thyromegaly.      Vascular: No JVD.   Cardiovascular:      Rate and Rhythm: Normal rate and regular rhythm.      Heart sounds: Normal heart sounds. No murmur heard.    No friction rub. No gallop.   Pulmonary:      Effort: Pulmonary effort is normal.      Breath sounds: Normal breath sounds. No wheezing or rales.   Abdominal:      General: Bowel sounds are normal. There is no distension.      Palpations: Abdomen is soft.      Tenderness: There is no abdominal tenderness. There is no guarding or rebound.   Musculoskeletal:         General: No tenderness. Normal range of motion.      Cervical back: Neck supple.   Skin:     General: Skin is warm and dry.   Neurological:      Mental Status: She is alert and oriented to person, place, and time.      Cranial Nerves: No cranial nerve deficit.   Psychiatric:         Behavior: Behavior normal.           Significant Labs: All pertinent labs within the past 24 hours have been reviewed.  CMP:   Recent Labs   Lab 06/09/23  1548 06/09/23  2004 06/10/23  0017 06/10/23  0336   * 138 147* 144   K 4.7 4.2 3.9 4.0   CL 87* 94* 100 102   CO2 34* 32* 31* 31*   * 395* 155* 214*   BUN 23 20 21 20   CREATININE 1.7* 1.3 1.4 1.4   CALCIUM 10.3 10.4 11.0* 10.4   PROT 8.6*  --   --   --    ALBUMIN 3.1*  --   --   --    BILITOT 1.0  --   --   --    ALKPHOS 301*  --   --   --    AST 34  --   --   --    ALT 32  --   --   --    ANIONGAP 12 12 16 11       Significant Imaging: I have reviewed all pertinent imaging results/findings within the past 24 hours.

## 2023-06-10 NOTE — ASSESSMENT & PLAN NOTE
- IP HHS/DKA Pathway initiated  - on presentation BG >700 and serum osmo 326 after 1L IVFs. B-hydroxybutyrate 1.3. pH 7.4 and bicarb 34. AG 12.  - 1L NS bolus x2  - start insulin gtt transition to regular insulin gtt by endocrine. Stopped fluids  - POC AC/HS  - Endocrinology consulted ; appreciate recs  - CLD started, bariatric  - further management pending clinical course and future study review

## 2023-06-10 NOTE — PROGRESS NOTES
[facility]  Progress Note    Patient Name: Mariann Huff  MRN: [id number]  Patient Class: IP- Inpatient   Admission Date: 6/9/2023  Length of Stay: 1 days  Attending Physician: Gregory Landers MD  Primary Care Provider: Jasbir Haney MD        Subjective:     Principal Problem:Type 2 diabetes mellitus with hyperosmolar hyperglycemic state (HHS)        HPI:  63F with PMH of CAD with GINGER , HTN, HLD, DM2, MURTAZA (not on CPAP), PAD, Afib and hx of DVT on Eliquis and recent admission for pancreatitis wo presents to the ED with nausea and vomiting. Patient recently admitted 05/25 to 05/26 for first episode of acute pancreatitis. Lipase >2000 at that time. CT abdomen and US without evidence of biliary obstruction or pancreatic inflammation. Lipid panel wnl. Patient pain and n/v improved with treatment for acute pancreatitis felt secondary to home med Trulicity. She was discharged with new insulin regimen which she reports she never picked up from pharmacy so has been taking farxiga only at home. States that she has had progressively worsening n/v and po intolerance. States that she is not having severe pain similar to recent pancreatitis. Denies fevers, chills, chest pain, shortness of breath.     In the ED patient afebrile and hemodynamically stable saturating well on room air. Lipase >2000. BG >700 and serum osmo 326 after 1L IVFs. B-hydroxybutyrate 1.3. pH 7.4 and bicarb 34. AG 12. Patient started on aggressive IVFs and insulin gtt for management on pancreatitis and HHS. Admitted to the care of medicine for further evaluation and management.       Overview/Hospital Course:  No notes on file    Interval History: No acute events overnight. Patient reports feeling approximately 89% back to normal. Denies any abdominal pain. Reports nausea has subsided. Patient to be transitioned to non-hhs insulin drip and start clears.    Review of Systems   Constitutional:  Negative for chills and fever.    HENT:  Negative for congestion and sore throat.    Eyes:  Negative for photophobia and visual disturbance.   Respiratory:  Negative for cough and shortness of breath.    Cardiovascular:  Negative for chest pain and palpitations.   Gastrointestinal:  Negative for abdominal pain, constipation, diarrhea, nausea and vomiting.   Endocrine: Negative for cold intolerance and heat intolerance.   Genitourinary:  Negative for dysuria and hematuria.   Musculoskeletal:  Negative for arthralgias and myalgias.   Skin:  Negative for rash.   Allergic/Immunologic: Negative for environmental allergies and food allergies.   Neurological:  Negative for dizziness, seizures, syncope and headaches.   Hematological:  Negative for adenopathy. Does not bruise/bleed easily.   Psychiatric/Behavioral:  Negative for hallucinations and suicidal ideas.    Objective:     Vital Signs (Most Recent):  Temp: 97.9 °F (36.6 °C) (06/10/23 1138)  Pulse: 61 (06/10/23 1138)  Resp: 18 (06/10/23 1138)  BP: 122/66 (06/10/23 1138)  SpO2: 99 % (06/10/23 1138) Vital Signs (24h Range):  Temp:  [97.1 °F (36.2 °C)-98.9 °F (37.2 °C)] 97.9 °F (36.6 °C)  Pulse:  [] 61  Resp:  [18-20] 18  SpO2:  [93 %-100 %] 99 %  BP: (121-187)/() 122/66     Weight: 62.8 kg (138 lb 7.2 oz)  Body mass index is 23.76 kg/m².    Intake/Output Summary (Last 24 hours) at 6/10/2023 1327  Last data filed at 6/9/2023 1746  Gross per 24 hour   Intake 1000 ml   Output --   Net 1000 ml         Physical Exam  Constitutional:       Appearance: She is well-developed.   HENT:      Head: Normocephalic and atraumatic.   Eyes:      Conjunctiva/sclera: Conjunctivae normal.      Pupils: Pupils are equal, round, and reactive to light.   Neck:      Thyroid: No thyromegaly.      Vascular: No JVD.   Cardiovascular:      Rate and Rhythm: Normal rate and regular rhythm.      Heart sounds: Normal heart sounds. No murmur heard.    No friction rub. No gallop.   Pulmonary:      Effort: Pulmonary effort  is normal.      Breath sounds: Normal breath sounds. No wheezing or rales.   Abdominal:      General: Bowel sounds are normal. There is no distension.      Palpations: Abdomen is soft.      Tenderness: There is no abdominal tenderness. There is no guarding or rebound.   Musculoskeletal:         General: No tenderness. Normal range of motion.      Cervical back: Neck supple.   Skin:     General: Skin is warm and dry.   Neurological:      Mental Status: She is alert and oriented to person, place, and time.      Cranial Nerves: No cranial nerve deficit.   Psychiatric:         Behavior: Behavior normal.           Significant Labs: All pertinent labs within the past 24 hours have been reviewed.  CMP:   Recent Labs   Lab 06/09/23  1548 06/09/23  2004 06/10/23  0017 06/10/23  0336   * 138 147* 144   K 4.7 4.2 3.9 4.0   CL 87* 94* 100 102   CO2 34* 32* 31* 31*   * 395* 155* 214*   BUN 23 20 21 20   CREATININE 1.7* 1.3 1.4 1.4   CALCIUM 10.3 10.4 11.0* 10.4   PROT 8.6*  --   --   --    ALBUMIN 3.1*  --   --   --    BILITOT 1.0  --   --   --    ALKPHOS 301*  --   --   --    AST 34  --   --   --    ALT 32  --   --   --    ANIONGAP 12 12 16 11       Significant Imaging: I have reviewed all pertinent imaging results/findings within the past 24 hours.      Assessment/Plan:      * Type 2 diabetes mellitus with hyperosmolar hyperglycemic state (HHS)  - IP HHS/DKA Pathway initiated  - on presentation BG >700 and serum osmo 326 after 1L IVFs. B-hydroxybutyrate 1.3. pH 7.4 and bicarb 34. AG 12.  - 1L NS bolus x2  - start insulin gtt transition to regular insulin gtt by endocrine. Stopped fluids  - POC AC/HS  - Endocrinology consulted ; appreciate recs  - CLD started, bariatric  - further management pending clinical course and future study review      History of DVT (deep vein thrombosis)  - continue home Eliquis      Acute pancreatitis  Lipase >2000 on presentation, downtrending without any identifiable cause for  "elevation. Patient without any abdominal pain.  - US abdomen with gallbladder sludge and "prominence" of the pancreatic duct   - recent Lipid panel largely unremarkable  - unclear etiology at this time. Unusual that pancreatic inflammation not noted on recent CT or US during similar admission.       PAD (peripheral artery disease)  - continue home pletal and ASA and statin      Asthma  - albuterol prn      PAPA (acute kidney injury)  Creatinine 1.7 on presentation ; baseline 1.0  - IVFs stopped  - Cr improved to 1.3 after patient received fluids  - avoid nephrotoxic agents as appropriate  - continue to monitor      Hypertension associated with diabetes  - continue home amlodipine, and metoprolol  - holding home HCTZ and losartan for PAPA  - hydralazine prn      VTE Risk Mitigation (From admission, onward)         Ordered     apixaban tablet 2.5 mg  2 times daily         06/09/23 1920                Discharge Planning   REBECCA: 6/12/2023     Code Status: Full Code   Is the patient medically ready for discharge?: No    Reason for patient still in hospital (select all that apply): Patient trending condition  Discharge Plan A: Home with family   Discharge Delays: None known at this time              Gregory Landers MD  Department of Hospital Medicine   [facility]    "

## 2023-06-10 NOTE — H&P
[facility] - Emergency Dept  [facility] Medicine  History & Physical    Patient Name: Mariann Huff  MRN: [id number]  Patient Class: OP- Observation  Admission Date: 6/9/2023  Attending Physician: Brad Scott MD   Primary Care Provider: Jasbir Haney MD         Patient information was obtained from patient, past medical records and ER records.     Subjective:     Principal Problem:HHS (hypothenar hammer syndrome)    Chief Complaint:   Chief Complaint   Patient presents with    Nausea     Pt reports nausea x2 weeks. Pt seen for same 2 weeks ago and discharged. Pt states she has remained nauseated.         HPI: 63F with PMH of CAD with GINGER , HTN, HLD, DM2, MURTAZA (not on CPAP), PAD, Afib and hx of DVT on Eliquis and recent admission for pancreatitis wo presents to the ED with nausea and vomiting. Patient recently admitted 05/25 to 05/26 for first episode of acute pancreatitis. Lipase >2000 at that time. CT abdomen and US without evidence of biliary obstruction or pancreatic inflammation. Lipid panel wnl. Patient pain and n/v improved with treatment for acute pancreatitis felt secondary to home med Trulicity. She was discharged with new insulin regimen which she reports she never picked up from pharmacy so has been taking farxiga only at home. States that she has had progressively worsening n/v and po intolerance. States that she is not having severe pain similar to recent pancreatitis. Denies fevers, chills, chest pain, shortness of breath.     In the ED patient afebrile and hemodynamically stable saturating well on room air. Lipase >2000. BG >700 and serum osmo 326 after 1L IVFs. B-hydroxybutyrate 1.3. pH 7.4 and bicarb 34. AG 12. Patient started on aggressive IVFs and insulin gtt for management on pancreatitis and HHS. Admitted to the care of medicine for further evaluation and management.       Past Medical History:   Diagnosis Date    Anticoagulant long-term use     Asthma     Back pain     Bradycardia, unspecified  2019    The etiology of the bradycardic episode is unclear.  The have appear to be respiratory in origin (at least the 1st episode).  We will continue to monitor carefully.  We are awaiting evaluation by Cardiology.     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, with long-term current use of insulin 2020       Past Surgical History:   Procedure Laterality Date    ANTEGRADE NEPHROSTOGRAPHY Left 2019    Procedure: Nephrostogram - antegrade;  Surgeon: Robin Boyd MD;  Location: [address];  Service: Urology;  Laterality: Left;    BRONCHOSCOPY N/A 2019    Procedure: BRONCHOSCOPY;  Surgeon: Sean Ruano MD;  Location: [address];  Service: General;  Laterality: N/A;    CARDIAC CATHETERIZATION      CATARACT EXTRACTION      cataract extraction left eye      cataracts      CAUDAL EPIDURAL STEROID INJECTION N/A 2019    Procedure: Injection-steroid-epidural-caudal;  Surgeon: Dave Bentley Jr., MD;  Location: [facility] T;  Service: Pain Management;  Laterality: N/A;     SECTION, LOW TRANSVERSE      COLONOSCOPY N/A 2019    Procedure: COLONOSCOPY;  Surgeon: Al Alaniz MD;  Location: [facility] ENDO ([address] [place] [address]);  Service: Endoscopy;  Laterality: N/A;    CORONARY ANGIOPLASTY      CYSTOGRAM N/A 2019    Procedure: CYSTOGRAM INTRAOP;  Surgeon: Robin Boyd MD;  Location: [facility] OR [address] [place] [address];   Service: Urology;  Laterality: N/A;  1 HOUR    CYSTOSCOPY W/ RETROGRADES Left 12/11/2019    Procedure: CYSTOSCOPY, WITH RETROGRADE PYELOGRAM;  Surgeon: Robin Boyd MD;  Location: [facility] OR 2ND FLR;  Service: Urology;  Laterality: Left;  fluro    ESOPHAGOGASTRODUODENOSCOPY W/ PEG  5/2/2019    Procedure: EGD, WITH PEG TUBE INSERTION;  Surgeon: Sean Ruano MD;  Location: [facility] OR 2ND FLR;  Service: General;;    EXCISION TURBINATE, SUBMUCOUS      FLEXIBLE SIGMOIDOSCOPY N/A 5/13/2019    Procedure: SIGMOIDOSCOPY, FLEXIBLE;  Surgeon: ALBERTO Amin MD;  Location: [facility] ENDO (2ND FLR);  Service: Endoscopy;  Laterality: N/A;    FLEXIBLE SIGMOIDOSCOPY N/A 5/21/2019    Procedure: SIGMOIDOSCOPY, FLEXIBLE;  Surgeon: ALBERTO Amin MD;  Location: [facility] ENDO (2ND FLR);  Service: Endoscopy;  Laterality: N/A;    FUSION OF LUMBAR SPINE BY ANTERIOR APPROACH Left 4/12/2019    Procedure: FUSION, SPINE, LUMBAR, ANTERIOR APPROACH Left L5-S1 Anterior to Psoa Interbody Fusion, L5-S1 Posterior Instrumentation;  Surgeon: Mk George MD;  [facility];  Service: Neurosurgery;  Laterality: Left;  Porcedure:  Left L5-S1 Anterior to Psoa Interbody Fusion, L5-S1 Posterior Instrumentation  Surgery Time: 4 Hrs  LOS: 2-3  Anesthesia: General   Blood: Type & Screen  R    HAND SURGERY Left     HAND SURGERY Right     torn ligament repair/ Dr. Yeboah    HYSTERECTOMY      INJECTION OF STEROID Right 12/10/2020    Procedure: INJECTION, STEROID Right SI Joint Block and Steroid Injection;  Surgeon: Mk George MD;  Location: [facility];  Service: Neurosurgery;  Laterality: Right;  Procedure: Right SI Joint Block and Steroid Injection   SUrgery Time: 30 Min  LOS: 0  Anesthesia: MAC  Radiology: C-arm  Bed: [place] [id number] Poster  Position: Prone    INJECTION OF STEROID Right 9/28/2021    Procedure: INJECTION, STEROID Right SI joint block & steroid injection;  Surgeon: Mk George MD;  Location: [facility];  Service:  Neurosurgery;  Laterality: Right;  Procedure: Right SI joint block & steroid injection  Surgery Time: 30m  Anesthesia: Local MAC  Radiology: C-arm  Bed: Regular  Position: Prone    left foot surgery      left wrist surgery      LYSIS OF ADHESIONS N/A 2/19/2020    Procedure: LYSIS, ADHESIONS;  Surgeon: Robin Boyd MD;  Location: [address];  Service: Urology;  Laterality: N/A;    NASAL SEPTUM SURGERY  5/7/15    PERCUTANEOUS NEPHROSTOMY Left 4/21/2019    Procedure: Creation, Nephrostomy, Percutaneous;  Surgeon: Karina Surgeon;  Location: [facility];  Service: Anesthesiology;  Laterality: Left;    REPAIR OF URETER  4/12/2019    Procedure: REPAIR, URETER;  Surgeon: Mk George MD;  Location: [address];  Service: Neurosurgery;;    REPLACEMENT OF NEPHROSTOMY TUBE N/A 7/18/2019    Procedure: REPLACEMENT, NEPHROSTOMY TUBE;  Surgeon: [facility] Diagnostic Provider;  [address]  Service: Anesthesiology;  Laterality: N/A;  188    REPLACEMENT OF NEPHROSTOMY TUBE N/A 7/24/2019    Procedure: REPLACEMENT, NEPHROSTOMY TUBE;  Surgeon: [facility] Diagnostic Provider;  Location: [address];  Service: Anesthesiology;  Laterality: N/A;  188    REPLACEMENT OF NEPHROSTOMY TUBE N/A 10/7/2019    Procedure: REPLACEMENT, NEPHROSTOMY TUBE;  Surgeon: [facility] Diagnostic Provider;  Location: [address];  Service: Anesthesiology;  Laterality: N/A;  189    REPLACEMENT OF NEPHROSTOMY TUBE N/A 11/25/2019    Procedure: REPLACEMENT, NEPHROSTOMY TUBE;  Surgeon: [facility] Diagnostic Provider;  [address]  Service: Anesthesiology;  Laterality: N/A;  Room 188/Bessy    REPLACEMENT OF NEPHROSTOMY TUBE Right 2/19/2020    Procedure: REPLACEMENT, NEPHROSTOMY TUBE removal removal;  Surgeon: Robin Boyd MD;  Location: [address];  Service: Urology;  Laterality: Right;    rt elbow surgery      S/P LAD COATED STENT  05/14/2010    6 total     S/P OM1 STENT  08/2003    SINUS SURGERY      F.E.S.S.     TRACHEOSTOMY N/A 2019    Procedure: CREATION, TRACHEOSTOMY;  Surgeon: Sean Ruano MD;  Location: [facility] OR [address] [place] [address];  Service: General;  Laterality: N/A;    TUBAL LIGATION      URETERAL REIMPLANTION Left 2020    Procedure: REIMPLANTATION, URETER WITH PSOAS HITCH;  Surgeon: Robin Boyd MD;  Location: [facility] OR [address] [place] [address];  Service: Urology;  Laterality: Left;       Review of patient's allergies indicates:   Allergen Reactions    Milk containing products (dairy)      Causes GI distress       Current Facility-Administered Medications on File Prior to Encounter   Medication    0.9%  NaCl infusion     Current Outpatient Medications on File Prior to Encounter   Medication Sig    amLODIPine (NORVASC) 10 MG tablet Take 1 tablet (10 mg total) by mouth once daily.    atorvastatin (LIPITOR) 40 MG tablet Take 1 tablet (40 mg total) by mouth every evening.    ACCU-CHEK EDIN PLUS METER Misc     albuterol (PROVENTIL/VENTOLIN HFA) 90 mcg/actuation inhaler Inhale 2 puffs into the lungs every 6 (six) hours as needed for Wheezing.    albuterol-ipratropium (DUO-NEB) 2.5 mg-0.5 mg/3 mL nebulizer solution Inhale 3 mLs into the lungs.    [] apixaban (ELIQUIS) 2.5 mg Tab Take 1 tablet (2.5 mg total) by mouth 2 (two) times daily.    aspirin 81 mg Tab Take 81 mg by mouth. 1 Tablet Oral Every day    blood sugar diagnostic (ACCU-CHEK EDIN PLUS TEST STRP) Strp TEST BLOOD SUGARS 4 TIMES DAILY    cilostazoL (PLETAL) 100 MG Tab Take 1 tablet (100 mg total) by mouth 2 (two) times daily.    dapagliflozin (FARXIGA) 10 mg tablet Take 1 tablet (10 mg total) by mouth once daily.    diclofenac sodium (VOLTAREN) 1 % Gel Apply 2 g topically 3 (three) times daily. Apply to the area of pain 2-3x per day or night as needed    EScitalopram oxalate (LEXAPRO) 10 MG tablet Take 1 tablet (10 mg total) by mouth once daily.    [] eszopiclone (LUNESTA) 2 MG Tab Take 1 tablet (2 mg total) by mouth every evening.     "evolocumab (REPATHA SURECLICK) 140 mg/mL PnIj Inject 1 mL (140 mg total) into the skin every 14 (fourteen) days.    gabapentin (NEURONTIN) 300 MG capsule Take 1 capsule (300 mg) in the morning and 2 capsules (600 mg) in the evening    glipiZIDE (GLUCOTROL) 10 MG TR24 Take 1 tablet (10 mg total) by mouth daily with breakfast.    hydroCHLOROthiazide (HYDRODIURIL) 12.5 MG Tab Take 1 tablet (12.5 mg total) by mouth once daily.    insulin detemir U-100, Levemir, (LEVEMIR FLEXPEN) 100 unit/mL (3 mL) InPn pen Inject 20 Units into the skin every evening.    isosorbide mononitrate (ISMO,MONOKET) 10 mg tablet Take 1 tablet (10 mg total) by mouth once daily.    metoprolol tartrate (LOPRESSOR) 50 MG tablet TAKE 1 TABLET BY MOUTH TWICE A DAY    multivitamin (THERAGRAN) per tablet Take 1 tablet by mouth once daily.    pen needle, diabetic (NOVOTWIST) 32 gauge x 1/5" Ndle Use 1 needle to inject insulin four times daily    potassium chloride SA (K-DUR,KLOR-CON) 20 MEQ tablet Take 1 tablet (20 mEq total) by mouth 2 (two) times daily.    promethazine (PHENERGAN) 12.5 MG Tab Take 1 tablet (12.5 mg total) by mouth every 8 (eight) hours as needed (nausea).    telmisartan (MICARDIS) 80 MG Tab Take 1 tablet (80 mg total) by mouth once daily. Stop losartan     Family History       Problem Relation (Age of Onset)    Diabetes Mother, Father, Sister, Brother, Sister    Heart attack Father    Heart disease Mother    Leukemia Father    No Known Problems Sister, Brother, Brother, Maternal Grandmother, Maternal Grandfather, Paternal Grandmother, Paternal Grandfather, Son, Son, Maternal Aunt, Maternal Uncle, Paternal Aunt, Paternal Uncle          Tobacco Use    Smoking status: Never    Smokeless tobacco: Never   Substance and Sexual Activity    Alcohol use: No     Alcohol/week: 0.0 standard drinks    Drug use: No    Sexual activity: Yes     Partners: Male     Birth control/protection: Post-menopausal     Comment:      Review " of Systems   Constitutional:  Positive for appetite change and fatigue. Negative for chills and fever.   HENT:  Negative for sore throat and trouble swallowing.    Eyes:  Negative for photophobia and visual disturbance.   Respiratory:  Negative for cough, shortness of breath and wheezing.    Cardiovascular:  Negative for chest pain, palpitations and leg swelling.   Gastrointestinal:  Positive for abdominal pain, nausea and vomiting. Negative for abdominal distention and diarrhea.   Genitourinary:  Negative for dysuria and hematuria.   Musculoskeletal:  Negative for neck pain and neck stiffness.   Skin:  Negative for rash and wound.   Neurological:  Positive for weakness. Negative for seizures, syncope, numbness and headaches.   Psychiatric/Behavioral:  Positive for decreased concentration. Negative for confusion.    Objective:     Vital Signs (Most Recent):  Temp: 98.7 °F (37.1 °C) (06/09/23 1710)  Pulse: 91 (06/09/23 1933)  Resp: 18 (06/09/23 1933)  BP: (!) 174/101 (06/09/23 1933)  SpO2: 99 % (06/09/23 1933) Vital Signs (24h Range):  Temp:  [98.4 °F (36.9 °C)-98.7 °F (37.1 °C)] 98.7 °F (37.1 °C)  Pulse:  [] 91  Resp:  [18-20] 18  SpO2:  [99 %] 99 %  BP: (140-176)/() 174/101     Weight: 63.5 kg (140 lb)  Body mass index is 24.03 kg/m².     Physical Exam  Constitutional:       General: She is not in acute distress.     Appearance: She is not toxic-appearing or diaphoretic.   HENT:      Head: Normocephalic and atraumatic.      Nose: Nose normal.   Eyes:      General: No scleral icterus.     Extraocular Movements: Extraocular movements intact.      Pupils: Pupils are equal, round, and reactive to light.   Cardiovascular:      Rate and Rhythm: Regular rhythm. Tachycardia present.   Pulmonary:      Effort: Pulmonary effort is normal. No respiratory distress.      Breath sounds: No wheezing or rales.   Abdominal:      General: Abdomen is flat. There is no distension.      Palpations: Abdomen is soft.       Tenderness: There is no abdominal tenderness. There is no guarding.   Musculoskeletal:         General: Normal range of motion.      Cervical back: Normal range of motion and neck supple. No rigidity.      Right lower leg: No edema.      Left lower leg: No edema.   Skin:     General: Skin is warm and dry.      Coloration: Skin is not jaundiced.   Neurological:      General: No focal deficit present.      Mental Status: She is alert and oriented to person, place, and time.      Cranial Nerves: No cranial nerve deficit.   Psychiatric:         Mood and Affect: Mood normal.         Behavior: Behavior normal.            CRANIAL NERVES     CN III, IV, VI   Pupils are equal, round, and reactive to light.     Significant Labs: All pertinent labs within the past 24 hours have been reviewed.  CBC:   Recent Labs   Lab 06/09/23  1548   WBC 9.28   HGB 11.7*   HCT 37.9        CMP:   Recent Labs   Lab 06/09/23  1548   *   K 4.7   CL 87*   CO2 34*   *   BUN 23   CREATININE 1.7*   CALCIUM 10.3   PROT 8.6*   ALBUMIN 3.1*   BILITOT 1.0   ALKPHOS 301*   AST 34   ALT 32   ANIONGAP 12     Lipase:   Recent Labs   Lab 06/09/23  1548   LIPASE 2024*     Urine Studies: No results for input(s): COLORU, APPEARANCEUA, PHUR, SPECGRAV, PROTEINUA, GLUCUA, KETONESU, BILIRUBINUA, OCCULTUA, NITRITE, UROBILINOGEN, LEUKOCYTESUR, RBCUA, WBCUA, BACTERIA, SQUAMEPITHEL, HYALINECASTS in the last 48 hours.    Invalid input(s): WRIGHTSUR    Significant Imaging: I have reviewed all pertinent imaging results/findings within the past 24 hours.    Assessment/Plan:     * HHS (hypothenar hammer syndrome)  - IP HHS/DKA Pathway initiated  - on presentation BG >700 and serum osmo 326 after 1L IVFs. B-hydroxybutyrate 1.3. pH 7.4 and bicarb 34. AG 12.  - 1L NS bolus x2  - start insulin gtt  - Accuchecks Q1h  - NS 125ml/hr continuous  ;  Change to D51/2NS 125ml/h continuous once BG<200  - BMP q4h  - Endocrinology consulted ; appreciate recs  - npo  -  further management pending clinical course and future study review      History of DVT (deep vein thrombosis)  - continue home Eliquis      Acute pancreatitis  - lipase >2000 on presentation  - US abdomen pending  - recent Lipid panel largely unremarkable  - Consider GI consult pending US review  - npo  - aggressive IVFs as above  - unclear etiology at this time. Unusual that pancreatic inflammation not noted on recent CT or US during similar admission. Other etiologies of elevated lipase may need to be considered and potentially symptoms are more largely due to HHS/uncontrolled DM?      PAD (peripheral artery disease)  - continue home pletal and ASA and statin      Asthma  - albuterol prn      PAPA (acute kidney injury)  - Creatinine 1.7 on presentation ; baseline 1.0  - IVFs as above  - avoid nephrotoxic agents as appropriate  - continue to monitor      Hypertension associated with diabetes  - continue home amlodipine, and metoprolol  - holding home HCTZ and losartan for PAPA  - hydralazine prn      VTE Risk Mitigation (From admission, onward)         Ordered     apixaban tablet 2.5 mg  2 times daily         06/09/23 1920                   On 06/09/2023, patient should be placed in hospital observation services under my care.        Som Gregory MD  Department of Hospital Medicine  [facility] - Emergency Dept

## 2023-06-10 NOTE — ED NOTES
Received report and assumed care of patient at this time.     Pt is a 63 yr old female arrived to er with c/o nausea x 2weeks. Pt reports seen 2 weeks ago for same symptoms and discharged but remained nauseated and vomiting. Pt states hx of pancreatitis and that she cant keep anything in her stomach, states vomits immediately after eating/drinking. Pt denies chest pain, abd pain, fever chills. Pt reports normal urination but stools are soft and unable to have complete bowel movement. Pt denies blood in emesis/stool. Pt is AAOx4, ambulatory and on room air.

## 2023-06-10 NOTE — ASSESSMENT & PLAN NOTE
- lipase >2000 on presentation  - US abdomen pending  - recent Lipid panel largely unremarkable  - Consider GI consult pending US review  - npo  - aggressive IVFs as above  - unclear etiology at this time. Unusual that pancreatic inflammation not noted on recent CT or US during similar admission. Other etiologies of elevated lipase may need to be considered and potentially symptoms are more largely due to HHS/uncontrolled DM?

## 2023-06-10 NOTE — ASSESSMENT & PLAN NOTE
"Lipase >2000 on presentation, downtrending without any identifiable cause for elevation. Patient without any abdominal pain.  - US abdomen with gallbladder sludge and "prominence" of the pancreatic duct   - recent Lipid panel largely unremarkable  - unclear etiology at this time. Unusual that pancreatic inflammation not noted on recent CT or US during similar admission.     "

## 2023-06-10 NOTE — PHARMACY MED REC
"Admission Medication History     The home medication history was taken by Ted Mckeon.    You may go to "Admission" then "Reconcile Home Medications" tabs to review and/or act upon these items.     The home medication list has been updated by the Pharmacy department.   Please read ALL comments highlighted in yellow.   Please address this information as you see fit.    Feel free to contact us if you have any questions or require assistance.      The medications listed below were removed from the home medication list. Please reorder if appropriate:  Patient reports no longer taking the following medication(s):  ALBUTEROL-IPRATROPIUM (DUO-NEB) 2.5-0.5 MG/3 ML       Current Outpatient Medications on File Prior to Encounter   Medication Sig    amLODIPine (NORVASC) 10 MG tablet   Take 1 tablet (10 mg total) by mouth once daily.    apixaban (ELIQUIS) 2.5 mg Tab   Take 1 tablet (2.5 mg total) by mouth 2 (two) times daily.    aspirin 81 mg Tab     Take 81 mg by mouth once daily. 1 Tablet Oral Every day    atorvastatin (LIPITOR) 40 MG tablet   Take 1 tablet (40 mg total) by mouth every evening.    dapagliflozin (FARXIGA) 10 mg tablet   Take 1 tablet (10 mg total) by mouth once daily.    diclofenac sodium (VOLTAREN) 1 % Gel   Apply 2 g topically 3 (three) times daily. Apply to the area of pain 2-3x per day or night as needed    EScitalopram oxalate (LEXAPRO) 10 MG tablet   Take 1 tablet (10 mg total) by mouth once daily.    eszopiclone (LUNESTA) 2 MG Tab   Take 1 tablet (2 mg total) by mouth every evening.    evolocumab (REPATHA SURECLICK) 140 mg/mL PnIj   Inject 1 mL (140 mg total) into the skin every 14 (fourteen) days.    gabapentin (NEURONTIN) 300 MG capsule   Take 1 capsule (300 mg) in the morning and 2 capsules (600 mg) in the evening    glipiZIDE (GLUCOTROL) 10 MG TR24   Take 1 tablet (10 mg total) by mouth daily with breakfast.    hydroCHLOROthiazide (HYDRODIURIL) 12.5 MG Tab   Take 1 tablet (12.5 mg total) by mouth " "once daily.    isosorbide mononitrate (ISMO,MONOKET) 10 mg tablet   Take 1 tablet (10 mg total) by mouth once daily.    promethazine (PHENERGAN) 12.5 MG Tab   Take 1 tablet (12.5 mg total) by mouth every 8 (eight) hours as needed (nausea).    telmisartan (MICARDIS) 80 MG Tab   Take 1 tablet (80 mg total) by mouth once daily. Stop losartan    ACCU-CHEK EDIN PLUS METER Misc       albuterol (PROVENTIL/VENTOLIN HFA) 90 mcg/actuation inhaler   Inhale 2 puffs into the lungs every 6 (six) hours as needed for Wheezing.    blood sugar diagnostic (ACCU-CHEK EDIN PLUS TEST STRP) Strp   TEST BLOOD SUGARS 4 TIMES DAILY    cilostazoL (PLETAL) 100 MG Tab   Take 1 tablet (100 mg total) by mouth 2 (two) times daily. (Patient not taking: Reported on 6/9/2023)    insulin detemir U-100, Levemir, (LEVEMIR FLEXPEN) 100 unit/mL (3 mL) InPn pen   Inject 20 Units into the skin every evening.    metoprolol tartrate (LOPRESSOR) 50 MG tablet   TAKE 1 TABLET BY MOUTH TWICE A DAY (Patient not taking: Reported on 6/9/2023)    multivitamin (THERAGRAN) per tablet   Take 1 tablet by mouth once daily.  (Patient not taking: Reported on 6/9/2023)    pen needle, diabetic (NOVOTWIST) 32 gauge x 1/5" Ndle   Use 1 needle to inject insulin four times daily    potassium chloride SA (K-DUR,KLOR-CON) 20 MEQ tablet   Take 1 tablet (20 mEq total) by mouth 2 (two) times daily. (Patient not taking: Reported on 6/9/2023)         Potential issues to be addressed PRIOR TO DISCHARGE  Please discuss with the patient barriers to adherence with medication treatment plans  Patient requires education regarding drug therapies     Ted Mckeon  [id number]                  .        "

## 2023-06-10 NOTE — PLAN OF CARE
Patient AAO x4, calm and cooperative, stable during shift. Insulin drip infusing per order. D5W/0.45 NS infusing per order. BG less than 300. PRN antiemetics administered for nausea and vomiting. PRN hydralazine administered for elevated blood pressure. Patient stable will continue to monitor.

## 2023-06-10 NOTE — HPI
63 year old  female with type 2 diabetes mellitus, CKDIV, CAD, hypertension, hyperlipidemia, MURTAZA (not on CPAP), PAD, Afib and hx of DVT on Eliquis and recent admission for pancreatitis who presents with HHS and developing possible biliary obstruction.     - Patient recently admitted  to  for acute pancreatitis attributed to Trulicity, which was discontinued upon d/c. Per pt's PCP this was 2nd episode of pancreatitis while on trulicity -- prior episode when increasing dose of Trulicity.   Pt states upon d/c  she was feeling slight improvement but still having intermittent n/v and not tolerating normal diet. N/V began to get worse in last few days which prompted admission. No abdominal pain on presentation for current admission, unlike last admit when she had severe abd pain for pancreatitis.    In the ED patient afebrile and hemodynamically stable saturating well on room air. Lipase >2000. BG >700 and serum osmo 326 after 1L IVFs. B-hydroxybutyrate 1.3. pH 7.4 and bicarb 34. AG 12. Patient started on aggressive IVFs and insulin gtt for HHS.     - Endocrinology consulted for initial HHS and glucose management      Regarding Type 2 Diabetes Mellitus:    - Initially diagnosed with Type 2 diabetes mellitus: Over 20 years prior  - Home diabetic medications include: Farxiga 10mg qd, Glipizide ER 10mg daily, Levemir 20u qhs (but never started). She has been on MDI previously, most recently she was on Toujeo and Humalog discontinued 2021 but she states she was on insulin up until about 10mo ago.  - Family History: Not asked  - Weight based dosin kg x 0.4 (CKD) = 25 TDD x 0.5 = 12 basal / 12 prandial  - 1700/TDD = 68 (estimated insulin sensitivity factor)  - 450/TDD = 18 (estimated starting carb ratio for prandial dosing)    Lab Results   Component Value Date    HGBA1C 8.1 (H) 2023    HGBA1C 8.2 (H) 2023    HGBA1C 8.8 (H) 2023    CPEPTIDE 1.36 2017     Lab Results    Component Value Date    EGFRNORACEVR 12.8 (A) 06/18/2023    EGFRNORACEVR 16.4 (A) 06/17/2023    EGFRNORACEVR 19.3 (A) 06/16/2023

## 2023-06-10 NOTE — SUBJECTIVE & OBJECTIVE
Interval HPI:   Overnight events: n/v improved this AM -- only with mild nausea relieved with anti-emetic. On insulin gtt with D51/2NS running this AM. BG 200s with dextrose IVF running.  Eating:   NPO, sugar-free CLD just ordered.  Nausea: Yes  Hypoglycemia and intervention: No  Fever: No  TPN and/or TF: No  If yes, type of TF/TPN and rate: n/a    PMH, PSH, FH, SH updated and reviewed     ROS:  Review of Systems   Constitutional:  Positive for appetite change (decreased). Negative for chills and fever.   HENT:  Negative for congestion and trouble swallowing.    Eyes:  Negative for pain and visual disturbance.   Respiratory:  Negative for cough and shortness of breath.    Cardiovascular:  Negative for chest pain and leg swelling.   Gastrointestinal:  Positive for nausea. Negative for abdominal distention, abdominal pain and vomiting.   Endocrine: Negative for polydipsia and polyuria.   Genitourinary:  Negative for decreased urine volume, difficulty urinating and hematuria.   Musculoskeletal:  Negative for gait problem and myalgias.   Skin:  Negative for rash and wound.   Neurological:  Negative for dizziness, syncope, weakness and light-headedness.   Hematological:  Bruises/bleeds easily (on eliquis).   Psychiatric/Behavioral:  Negative for agitation and confusion.      Current Medications and/or Treatments Impacting Glycemic Control  Immunotherapy:    Immunosuppressants       None          Steroids:   Hormones (From admission, onward)      Start     Stop Route Frequency Ordered    06/09/23 2028  melatonin tablet 6 mg         -- Oral Nightly PRN 06/09/23 1931          Pressors:    Autonomic Drugs (From admission, onward)      None          Hyperglycemia/Diabetes Medications:   Antihyperglycemics (From admission, onward)      Start     Stop Route Frequency Ordered    06/10/23 1315  insulin regular in 0.9 % NaCl 100 unit/100 mL (1 unit/mL) infusion        Question:  Enter initial dose (Units/hr):  Answer:  0.6    --  IV Continuous 06/10/23 1211    06/10/23 1309  insulin aspart U-100 pen 0-5 Units         -- SubQ As needed (PRN) 06/10/23 1211    06/10/23 1215  insulin aspart U-100 pen 3 Units         -- SubQ 3 times daily with meals 06/10/23 1211             PHYSICAL EXAMINATION:  Vitals:    06/10/23 1138   BP: 122/66   Pulse: 61   Resp: 18   Temp: 97.9 °F (36.6 °C)     Body mass index is 23.76 kg/m².     Physical Exam  Vitals and nursing note reviewed.   Constitutional:       General: She is not in acute distress.     Appearance: Normal appearance. She is normal weight. She is not ill-appearing.   HENT:      Head: Normocephalic and atraumatic.      Mouth/Throat:      Mouth: Mucous membranes are moist.   Eyes:      General:         Right eye: No discharge.         Left eye: No discharge.      Conjunctiva/sclera: Conjunctivae normal.   Cardiovascular:      Rate and Rhythm: Normal rate and regular rhythm.   Pulmonary:      Effort: Pulmonary effort is normal. No respiratory distress.   Abdominal:      General: There is no distension.      Palpations: Abdomen is soft.      Tenderness: There is no abdominal tenderness. There is no guarding or rebound.   Musculoskeletal:      Cervical back: Normal range of motion and neck supple.      Right lower leg: No edema.      Left lower leg: No edema.   Skin:     General: Skin is warm and dry.   Neurological:      Mental Status: She is alert and oriented to person, place, and time.   Psychiatric:         Mood and Affect: Mood normal.         Behavior: Behavior normal.

## 2023-06-10 NOTE — NURSING
Patient Blood pressure elevated , PRN hydralazine administered  BP stable post medication, please see flow sheet for VS

## 2023-06-10 NOTE — PLAN OF CARE
Problem: Adult Inpatient Plan of Care  Goal: Plan of Care Review  Outcome: Ongoing, Progressing  Goal: Patient-Specific Goal (Individualized)  Outcome: Ongoing, Progressing  Goal: Absence of Hospital-Acquired Illness or Injury  Outcome: Ongoing, Progressing  Goal: Optimal Comfort and Wellbeing  Outcome: Ongoing, Progressing  Goal: Readiness for Transition of Care  Outcome: Ongoing, Progressing     Problem: Diabetic Ketoacidosis  Goal: Fluid and Electrolyte Balance with Absence of Ketosis  Outcome: Ongoing, Progressing     Patient alert and oriented x 4. No acute changes occur this shift. No fall this shift. Family at bedside. Call light within reach. Will continue to monitor.

## 2023-06-10 NOTE — NURSING
Patient arrived from Emergency room, transferred by ER Nurse. Insulin gtt infusing, hand off completed. BG checked, BG-207. Per [facility] order protocol D5w/0.45 Nacl started. Family at bedside will continue to monitor patient

## 2023-06-10 NOTE — ED NOTES
Pt had emesis episode. Pt was cleaned, bed sheets and gown changed. Pt is now resting comfortably.

## 2023-06-10 NOTE — ASSESSMENT & PLAN NOTE
Likely 2/2 IVVD 2/2 HHS on admission  Baseline Cr appears to be around 1.1-1.2  Improving  Management per primary

## 2023-06-10 NOTE — ASSESSMENT & PLAN NOTE
- IP HHS/DKA Pathway initiated  - on presentation BG >700 and serum osmo 326 after 1L IVFs. B-hydroxybutyrate 1.3. pH 7.4 and bicarb 34. AG 12.  - 1L NS bolus x2  - start insulin gtt  - Accuchecks Q1h  - NS 125ml/hr continuous  ;  Change to D51/2NS 125ml/h continuous once BG<200  - BMP q4h  - Endocrinology consulted ; appreciate recs  - npo  - further management pending clinical course and future study review

## 2023-06-10 NOTE — ASSESSMENT & PLAN NOTE
HHS on admission. Recent pancreatitis admission, not yet back to normal po intake. Taking Glipizide and Farxiga at home. Never picked up Levemir Rx recently prescribed by her PCP.     Symptoms now improved after remaining on IIP o/n. She is ready to try to advance diet. Does not seem to have acute pancreatitis sx now, rather some residual nausea from very recent admission for pancreatitis. Does not seem she was back at her baseline yet prior to current admit.    Endocrinology consulted for BG management.   BG goal 140-180     - Transition drip at 0.6 units/hr with step-down parameters. I will likely switch from transition gtt to levemir this evening pending glucose stability on gtt and on appetite/po intake. When ever she is switched however, will need to ensure to overlap insulin gtt by 2hrs with first levemir dose.  - Novolog (aspart) insulin 3 Units SQ TIDWM and prn for BG excursions low dose SSI (150/50).  - BG checks ac/hs/0200 while on transition gtt, can change to ac/hs once off gtt and levemir started. If not eating meals for any reason (nausea, NPO, etc) please do accuchecks q4h  - Hypoglycemia protocol in place    ** Please notify Endocrine for any change and/or advance in diet**  ** Please call Endocrine for any BG related issues **    Discharge Planning:   TBD. Please notify endocrinology prior to discharge.  I did discuss with the pt that in the future, she should hold Farxiga if having n/v/decreased po intake. Additionally should hold for 3d prior to any planned procedure. Pt states she was unaware of this.

## 2023-06-10 NOTE — HPI
63F with PMH of CAD with GINGER , HTN, HLD, DM2, MURTAZA (not on CPAP), PAD, Afib and hx of DVT on Eliquis and recent admission for pancreatitis wo presents to the ED with nausea and vomiting. Patient recently admitted 05/25 to 05/26 for first episode of acute pancreatitis. Lipase >2000 at that time. CT abdomen and US without evidence of biliary obstruction or pancreatic inflammation. Lipid panel wnl. Patient pain and n/v improved with treatment for acute pancreatitis felt secondary to home med Trulicity. She was discharged with new insulin regimen which she reports she never picked up from pharmacy so has been taking farxiga only at home. States that she has had progressively worsening n/v and po intolerance. States that she is not having severe pain similar to recent pancreatitis. Denies fevers, chills, chest pain, shortness of breath.     In the ED patient afebrile and hemodynamically stable saturating well on room air. Lipase >2000. BG >700 and serum osmo 326 after 1L IVFs. B-hydroxybutyrate 1.3. pH 7.4 and bicarb 34. AG 12. Patient started on aggressive IVFs and insulin gtt for management on pancreatitis and HHS. Admitted to the care of medicine for further evaluation and management.

## 2023-06-10 NOTE — ASSESSMENT & PLAN NOTE
- Creatinine 1.7 on presentation ; baseline 1.0  - IVFs as above  - avoid nephrotoxic agents as appropriate  - continue to monitor

## 2023-06-10 NOTE — ASSESSMENT & PLAN NOTE
- continue home amlodipine, and metoprolol  - holding home HCTZ and losartan for PAPA  - hydralazine prn

## 2023-06-10 NOTE — PLAN OF CARE
06/10/23 0207   Post-Acute Status   Post-Acute Authorization Other   Other Status No Post-Acute Service Needs   Discharge Delays None known at this time   Discharge Plan   Discharge Plan A Home with family  (DIABETIC EDUCATION)   Discharge Plan B Home with family           SW at pt's bedside to complete discharge planning. Pt stated she is agreeable with receiving  education on how to care live with and control her diabetes.        Tracy Galvez LMSW  Case Management  Emergency Department  [phone]

## 2023-06-10 NOTE — CONSULTS
"[facility] - Telemetry Stepdown  Endocrinology  Diabetes Consult Note    Consult Requested by: Gregory Landers MD   Reason for admit: Type 2 diabetes mellitus with hyperosmolar hyperglycemic state (HHS)    HISTORY OF PRESENT ILLNESS:  Reason for Consult: Management of T2DM, Hyperglycemia, HHS on admission    Surgical Procedure and Date: n/a    Diabetes diagnosis year: pt not sure - states "long time ago"; at least as far back as 2004 per review of elevated a1c in epic    Home Diabetes Medications:  Farxiga 10mg qd, Glipizide ER 10mg before breakfast. Levemir 20u qhs prescribed by her PCP on 5/31/2023, but pt did not  Rx/did not start this    She states she has been on MDI insulin in the past, most recently she was on Toujeo and Humalog that appear to have been discontinued around 9/2021. However pt states she was on insulin up until approx 10mo ago.    How often checking glucose at home?  Usually 2x/day, not checking at all in last few days due to feeling bad    BG readings on regimen: 130s-170s ac lunch and bedtime per pt  Hypoglycemia on the regimen?  No  Missed doses on regimen?  Yes; not taking levemir as described above. She reports no missed doses of Farxiga or Glipizide    Diabetes Complications include:     Hyperglycemia, Diabetic chronic kidney disease     , Diabetic peripheral neuropathy , and Diabetic peripheral angiopathy with/without gangrene    Complicating diabetes co morbidities:   History of MI, MURTAZA, and CKD      HPI:   Patient is a 63 y.o. female with CAD with GINGER , HTN, HLD, DM2, CKD, MURTAZA (not on CPAP), PAD, Afib and hx of DVT on Eliquis and recent admission for pancreatitis wo presented to the ED on 6/9/2023 with nausea and vomiting. Patient recently admitted 05/25 to 05/26 for acute pancreatitis attributed to Trulicity, which was discontinued upon d/c. Per pt's PCP this was 2nd episode of pancreatitis while on trulicity -- prior episode when increasing dose of Trulicity. Pt states upon d/c " 5/26 she was feeling slight improvement but still having intermittent n/v and not tolerating normal diet. N/V began to get worse in last few days which prompted admission. No abdominal pain on presentation for current admission, unlike last admit when she had severe abd pain for pancreatitis.    In the ED patient afebrile and hemodynamically stable saturating well on room air. Lipase >2000. BG >700 and serum osmo 326 after 1L IVFs. B-hydroxybutyrate 1.3. pH 7.4 and bicarb 34. AG 12. Patient started on aggressive IVFs and insulin gtt for HHS. Admitted to hospital medicine. Endocrinology consulted for HHS/BG management.       Interval HPI:   Overnight events: n/v improved this AM -- only with mild nausea relieved with anti-emetic. On insulin gtt with D51/2NS running this AM. BG 200s with dextrose IVF running.  Eating:   NPO, sugar-free CLD just ordered.  Nausea: Yes  Hypoglycemia and intervention: No  Fever: No  TPN and/or TF: No  If yes, type of TF/TPN and rate: n/a    PMH, PSH, FH, SH updated and reviewed     ROS:  Review of Systems   Constitutional:  Positive for appetite change (decreased). Negative for chills and fever.   HENT:  Negative for congestion and trouble swallowing.    Eyes:  Negative for pain and visual disturbance.   Respiratory:  Negative for cough and shortness of breath.    Cardiovascular:  Negative for chest pain and leg swelling.   Gastrointestinal:  Positive for nausea. Negative for abdominal distention, abdominal pain and vomiting.   Endocrine: Negative for polydipsia and polyuria.   Genitourinary:  Negative for decreased urine volume, difficulty urinating and hematuria.   Musculoskeletal:  Negative for gait problem and myalgias.   Skin:  Negative for rash and wound.   Neurological:  Negative for dizziness, syncope, weakness and light-headedness.   Hematological:  Bruises/bleeds easily (on eliquis).   Psychiatric/Behavioral:  Negative for agitation and confusion.      Current Medications and/or  Treatments Impacting Glycemic Control  Immunotherapy:    Immunosuppressants       None          Steroids:   Hormones (From admission, onward)      Start     Stop Route Frequency Ordered    06/09/23 2028  melatonin tablet 6 mg         -- Oral Nightly PRN 06/09/23 1931          Pressors:    Autonomic Drugs (From admission, onward)      None          Hyperglycemia/Diabetes Medications:   Antihyperglycemics (From admission, onward)      Start     Stop Route Frequency Ordered    06/10/23 1315  insulin regular in 0.9 % NaCl 100 unit/100 mL (1 unit/mL) infusion        Question:  Enter initial dose (Units/hr):  Answer:  0.6    -- IV Continuous 06/10/23 1211    06/10/23 1309  insulin aspart U-100 pen 0-5 Units         -- SubQ As needed (PRN) 06/10/23 1211    06/10/23 1215  insulin aspart U-100 pen 3 Units         -- SubQ 3 times daily with meals 06/10/23 1211             PHYSICAL EXAMINATION:  Vitals:    06/10/23 1138   BP: 122/66   Pulse: 61   Resp: 18   Temp: 97.9 °F (36.6 °C)     Body mass index is 23.76 kg/m².     Physical Exam  Vitals and nursing note reviewed.   Constitutional:       General: She is not in acute distress.     Appearance: Normal appearance. She is normal weight. She is not ill-appearing.   HENT:      Head: Normocephalic and atraumatic.      Mouth/Throat:      Mouth: Mucous membranes are moist.   Eyes:      General:         Right eye: No discharge.         Left eye: No discharge.      Conjunctiva/sclera: Conjunctivae normal.   Cardiovascular:      Rate and Rhythm: Normal rate and regular rhythm.   Pulmonary:      Effort: Pulmonary effort is normal. No respiratory distress.   Abdominal:      General: There is no distension.      Palpations: Abdomen is soft.      Tenderness: There is no abdominal tenderness. There is no guarding or rebound.   Musculoskeletal:      Cervical back: Normal range of motion and neck supple.      Right lower leg: No edema.      Left lower leg: No edema.   Skin:     General: Skin  is warm and dry.   Neurological:      Mental Status: She is alert and oriented to person, place, and time.   Psychiatric:         Mood and Affect: Mood normal.         Behavior: Behavior normal.            Labs Reviewed and Include   Recent Labs   Lab 06/09/23  1548 06/09/23  2004 06/10/23  0336   *   < > 214*   CALCIUM 10.3   < > 10.4   ALBUMIN 3.1*  --   --    PROT 8.6*  --   --    *   < > 144   K 4.7   < > 4.0   CO2 34*   < > 31*   CL 87*   < > 102   BUN 23   < > 20   CREATININE 1.7*   < > 1.4   ALKPHOS 301*  --   --    ALT 32  --   --    AST 34  --   --    BILITOT 1.0  --   --     < > = values in this interval not displayed.     Lab Results   Component Value Date    WBC 9.59 06/10/2023    HGB 13.4 06/10/2023    HCT 44.4 06/10/2023    MCV 88 06/10/2023     06/10/2023     No results for input(s): TSH, FREET4 in the last 168 hours.  Lab Results   Component Value Date    HGBA1C 8.1 (H) 05/25/2023       Nutritional status:   Body mass index is 23.76 kg/m².  Lab Results   Component Value Date    ALBUMIN 3.1 (L) 06/09/2023    ALBUMIN 3.3 (L) 05/31/2023    ALBUMIN 3.4 (L) 05/26/2023     No results found for: PREALBUMIN    Estimated Creatinine Clearance: 36 mL/min (based on SCr of 1.4 mg/dL).    Accu-Checks  Recent Labs     06/10/23  0218 06/10/23  0317 06/10/23  0417 06/10/23  0522 06/10/23  0620 06/10/23  0715 06/10/23  0823 06/10/23  0927 06/10/23  1032 06/10/23  1118   POCTGLUCOSE 191* 180* 219* 262* 259* 262* 248* 229* 243* 191*        ASSESSMENT and PLAN    Cardiac/Vascular  Hyperlipidemia associated with type 2 diabetes mellitus  On Lipitor 40mg and Repatha outpatient  Management per primary      Hypertension associated with diabetes  Hypertensive on admission  On multiple anti-HTNs outpatient  Management per primary      Renal/  PAPA (acute kidney injury)  Likely 2/2 IVVD 2/2 HHS on admission  Baseline Cr appears to be around 1.1-1.2  Improving  Management per primary      Endocrine  * Type 2  diabetes mellitus with hyperosmolar hyperglycemic state (HHS)  HHS on admission. Recent pancreatitis admission, not yet back to normal po intake. Taking Glipizide and Farxiga at home. Never picked up Levemir Rx recently prescribed by her PCP.     Symptoms now improved after remaining on IIP o/n. She is ready to try to advance diet. Does not seem to have acute pancreatitis sx now, rather some residual nausea from very recent admission for pancreatitis. Does not seem she was back at her baseline yet prior to current admit.    Endocrinology consulted for BG management.   BG goal 140-180     - Transition drip at 0.6 units/hr with step-down parameters. I will likely switch from transition gtt to levemir this evening pending glucose stability on gtt and on appetite/po intake. When ever she is switched however, will need to ensure to overlap insulin gtt by 2hrs with first levemir dose.  - Novolog (aspart) insulin 3 Units SQ TIDWM and prn for BG excursions low dose SSI (150/50).  - BG checks ac/hs/0200 while on transition gtt, can change to ac/hs once off gtt and levemir started. If not eating meals for any reason (nausea, NPO, etc) please do accuchecks q4h  - Hypoglycemia protocol in place    ** Please notify Endocrine for any change and/or advance in diet**  ** Please call Endocrine for any BG related issues **    Discharge Planning:   TBD. Please notify endocrinology prior to discharge.  I did discuss with the pt that in the future, she should hold Farxiga if having n/v/decreased po intake. Additionally should hold for 3d prior to any planned procedure. Pt states she was unaware of this.    GI  History of pancreatitis  Had an episode of pancreatitis in the past also attributed to increasing Trulcity dose. Trulicity was continued vs restarted after that and then she presented to the ED for 2nd episode of pancreatitis requiring hospitalization in 5/2023, discharged on 5/26/2026   Trulicity was stopped upon that d/c  She  has now had two episodes of pancreatitis on incretin, would recommend avoiding all incretins in the future         Plan discussed with patient, family, and RN at bedside.     Jerilyn Harrison MD  Endocrinology  [facility] - Telemetry Stepdown

## 2023-06-10 NOTE — SUBJECTIVE & OBJECTIVE
Past Medical History:   Diagnosis Date    Anticoagulant long-term use     Asthma     Back pain     Bradycardia, unspecified 2019    The etiology of the bradycardic episode is unclear.  The have appear to be respiratory in origin (at least the 1st episode).  We will continue to monitor carefully.  We are awaiting evaluation by Cardiology.      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, with long-term current use of insulin 2020       Past Surgical History:   Procedure Laterality Date    ANTEGRADE NEPHROSTOGRAPHY Left 2019    Procedure: Nephrostogram - antegrade;  Surgeon: Robin Boyd MD;  Location: [facility] OR [address] [place] [address];  Service: Urology;  Laterality: Left;    BRONCHOSCOPY N/A 2019    Procedure: BRONCHOSCOPY;  Surgeon: Sean Ruano MD;  Location: [facility] OR [address] [place] [address];  Service: General;  Laterality: N/A;    CARDIAC CATHETERIZATION      CATARACT EXTRACTION      cataract extraction left eye      cataracts      CAUDAL EPIDURAL STEROID INJECTION N/A 2019    Procedure: Injection-steroid-epidural-caudal;  Surgeon: Dave Bentley Jr., MD;  Location: [facility] PAIN MGT;  Service: Pain Management;  Laterality: N/A;     SECTION, LOW TRANSVERSE      COLONOSCOPY N/A 2019    Procedure: COLONOSCOPY;  Surgeon: Al Alaniz MD;  [facility]  Service: Endoscopy;  Laterality: N/A;    CORONARY ANGIOPLASTY      CYSTOGRAM N/A  12/11/2019    Procedure: CYSTOGRAM INTRAOP;  Surgeon: Robin Boyd MD;  [facility];  Service: Urology;  Laterality: N/A;  1 HOUR    CYSTOSCOPY W/ RETROGRADES Left 12/11/2019    Procedure: CYSTOSCOPY, WITH RETROGRADE PYELOGRAM;  Surgeon: Robin Boyd MD;  [facility];  Service: Urology;  Laterality: Left;  fluro    ESOPHAGOGASTRODUODENOSCOPY W/ PEG  5/2/2019    Procedure: EGD, WITH PEG TUBE INSERTION;  Surgeon: Sean Ruano MD;  [facility];  Service: General;;    EXCISION TURBINATE, SUBMUCOUS      FLEXIBLE SIGMOIDOSCOPY N/A 5/13/2019    Procedure: SIGMOIDOSCOPY, FLEXIBLE;  Surgeon: ALBERTO Amin MD;  Location: [facility] ENDO (2ND FLR);  Service: Endoscopy;  Laterality: N/A;    FLEXIBLE SIGMOIDOSCOPY N/A 5/21/2019    Procedure: SIGMOIDOSCOPY, FLEXIBLE;  Surgeon: ALBERTO Amin MD;  Location: [facility] ENDO ([facility] FLR);  Service: Endoscopy;  Laterality: N/A;    FUSION OF LUMBAR SPINE BY ANTERIOR APPROACH Left 4/12/2019    Procedure: FUSION, SPINE, LUMBAR, ANTERIOR APPROACH Left L5-S1 Anterior to Psoa Interbody Fusion, L5-S1 Posterior Instrumentation;  Surgeon: Mk George MD;  Location: [address];  Service: Neurosurgery;  Laterality: Left;  Porcedure:  Left L5-S1 Anterior to Psoa Interbody Fusion, L5-S1 Posterior Instrumentation  Surgery Time: 4 Hrs  LOS: 2-3  Anesthesia: General   Blood: Type & Screen  R    HAND SURGERY Left     HAND SURGERY Right     torn ligament repair/ Dr. Yeboah    HYSTERECTOMY      INJECTION OF STEROID Right 12/10/2020    Procedure: INJECTION, STEROID Right SI Joint Block and Steroid Injection;  Surgeon: Mk George MD;  Location: [facility];  Service: Neurosurgery;  Laterality: Right;  Procedure: Right SI Joint Block and Steroid Injection   SUrgery Time: 30 Min  LOS: 0  Anesthesia: MAC  Radiology: C-arm  Bed: [place] [address] Poster  Position: Prone    INJECTION OF STEROID Right 9/28/2021    Procedure: INJECTION, STEROID Right SI joint  block & steroid injection;  Surgeon: Mk George MD;  Location: [facility] OR;  Service: Neurosurgery;  Laterality: Right;  Procedure: Right SI joint block & steroid injection  Surgery Time: 30m  Anesthesia: Local MAC  Radiology: C-arm  Bed: Regular  Position: Prone    left foot surgery      left wrist surgery      LYSIS OF ADHESIONS N/A 2/19/2020    Procedure: LYSIS, ADHESIONS;  Surgeon: Robin Boyd MD;  [facility];  Service: Urology;  Laterality: N/A;    NASAL SEPTUM SURGERY  5/7/15    PERCUTANEOUS NEPHROSTOMY Left 4/21/2019    Procedure: Creation, Nephrostomy, Percutaneous;  Surgeon: Karina Surgeon;  Location: [facility];  Service: Anesthesiology;  Laterality: Left;    REPAIR OF URETER  4/12/2019    Procedure: REPAIR, URETER;  Surgeon: Mk George MD;  [address];  Service: Neurosurgery;;    REPLACEMENT OF NEPHROSTOMY TUBE N/A 7/18/2019    Procedure: REPLACEMENT, NEPHROSTOMY TUBE;  Surgeon: [facility] Diagnostic Provider;  [address]  Service: Anesthesiology;  Laterality: N/A;  188    REPLACEMENT OF NEPHROSTOMY TUBE N/A 7/24/2019    Procedure: REPLACEMENT, NEPHROSTOMY TUBE;  Surgeon: [facility] Diagnostic Provider;  [facility];  Service: Anesthesiology;  Laterality: N/A;  188    REPLACEMENT OF NEPHROSTOMY TUBE N/A 10/7/2019    Procedure: REPLACEMENT, NEPHROSTOMY TUBE;  Surgeon: [facility] Diagnostic Provider;  [facility];  Service: Anesthesiology;  Laterality: N/A;  189    REPLACEMENT OF NEPHROSTOMY TUBE N/A 11/25/2019    Procedure: REPLACEMENT, NEPHROSTOMY TUBE;  Surgeon: [facility] Diagnostic Provider;  [facility];  Service: Anesthesiology;  Laterality: N/A;  Room 188/Bessy    REPLACEMENT OF NEPHROSTOMY TUBE Right 2/19/2020    Procedure: REPLACEMENT, NEPHROSTOMY TUBE removal removal;  Surgeon: Robin Boyd MD;  [facility];  Service: Urology;  Laterality: Right;    rt elbow surgery      S/P LAD COATED STENT  05/14/2010    6  total     S/P OM1 STENT  2003    SINUS SURGERY      F.E.S.S.    TRACHEOSTOMY N/A 2019    Procedure: CREATION, TRACHEOSTOMY;  Surgeon: Sean Ruano MD;  Location: [facility] OR [address] [place] [address];  Service: General;  Laterality: N/A;    TUBAL LIGATION      URETERAL REIMPLANTION Left 2020    Procedure: REIMPLANTATION, URETER WITH PSOAS HITCH;  Surgeon: Robin Boyd MD;  Location: [facility] OR [address] [place] [address];  Service: Urology;  Laterality: Left;       Review of patient's allergies indicates:   Allergen Reactions    Milk containing products (dairy)      Causes GI distress       Current Facility-Administered Medications on File Prior to Encounter   Medication    0.9%  NaCl infusion     Current Outpatient Medications on File Prior to Encounter   Medication Sig    amLODIPine (NORVASC) 10 MG tablet Take 1 tablet (10 mg total) by mouth once daily.    atorvastatin (LIPITOR) 40 MG tablet Take 1 tablet (40 mg total) by mouth every evening.    ACCU-CHEK EDIN PLUS METER Misc     albuterol (PROVENTIL/VENTOLIN HFA) 90 mcg/actuation inhaler Inhale 2 puffs into the lungs every 6 (six) hours as needed for Wheezing.    albuterol-ipratropium (DUO-NEB) 2.5 mg-0.5 mg/3 mL nebulizer solution Inhale 3 mLs into the lungs.    [] apixaban (ELIQUIS) 2.5 mg Tab Take 1 tablet (2.5 mg total) by mouth 2 (two) times daily.    aspirin 81 mg Tab Take 81 mg by mouth. 1 Tablet Oral Every day    blood sugar diagnostic (ACCU-CHEK EIDN PLUS TEST STRP) Strp TEST BLOOD SUGARS 4 TIMES DAILY    cilostazoL (PLETAL) 100 MG Tab Take 1 tablet (100 mg total) by mouth 2 (two) times daily.    dapagliflozin (FARXIGA) 10 mg tablet Take 1 tablet (10 mg total) by mouth once daily.    diclofenac sodium (VOLTAREN) 1 % Gel Apply 2 g topically 3 (three) times daily. Apply to the area of pain 2-3x per day or night as needed    EScitalopram oxalate (LEXAPRO) 10 MG tablet Take 1 tablet (10 mg total) by mouth once daily.    [] eszopiclone (LUNESTA) 2 MG Tab Take 1 tablet  "(2 mg total) by mouth every evening.    evolocumab (REPATHA SURECLICK) 140 mg/mL PnIj Inject 1 mL (140 mg total) into the skin every 14 (fourteen) days.    gabapentin (NEURONTIN) 300 MG capsule Take 1 capsule (300 mg) in the morning and 2 capsules (600 mg) in the evening    glipiZIDE (GLUCOTROL) 10 MG TR24 Take 1 tablet (10 mg total) by mouth daily with breakfast.    hydroCHLOROthiazide (HYDRODIURIL) 12.5 MG Tab Take 1 tablet (12.5 mg total) by mouth once daily.    insulin detemir U-100, Levemir, (LEVEMIR FLEXPEN) 100 unit/mL (3 mL) InPn pen Inject 20 Units into the skin every evening.    isosorbide mononitrate (ISMO,MONOKET) 10 mg tablet Take 1 tablet (10 mg total) by mouth once daily.    metoprolol tartrate (LOPRESSOR) 50 MG tablet TAKE 1 TABLET BY MOUTH TWICE A DAY    multivitamin (THERAGRAN) per tablet Take 1 tablet by mouth once daily.    pen needle, diabetic (NOVOTWIST) 32 gauge x 1/5" Ndle Use 1 needle to inject insulin four times daily    potassium chloride SA (K-DUR,KLOR-CON) 20 MEQ tablet Take 1 tablet (20 mEq total) by mouth 2 (two) times daily.    promethazine (PHENERGAN) 12.5 MG Tab Take 1 tablet (12.5 mg total) by mouth every 8 (eight) hours as needed (nausea).    telmisartan (MICARDIS) 80 MG Tab Take 1 tablet (80 mg total) by mouth once daily. Stop losartan     Family History       Problem Relation (Age of Onset)    Diabetes Mother, Father, Sister, Brother, Sister    Heart attack Father    Heart disease Mother    Leukemia Father    No Known Problems Sister, Brother, Brother, Maternal Grandmother, Maternal Grandfather, Paternal Grandmother, Paternal Grandfather, Son, Son, Maternal Aunt, Maternal Uncle, Paternal Aunt, Paternal Uncle          Tobacco Use    Smoking status: Never    Smokeless tobacco: Never   Substance and Sexual Activity    Alcohol use: No     Alcohol/week: 0.0 standard drinks    Drug use: No    Sexual activity: Yes     Partners: Male     Birth control/protection: Post-menopausal     " Comment:      Review of Systems   Constitutional:  Positive for appetite change and fatigue. Negative for chills and fever.   HENT:  Negative for sore throat and trouble swallowing.    Eyes:  Negative for photophobia and visual disturbance.   Respiratory:  Negative for cough, shortness of breath and wheezing.    Cardiovascular:  Negative for chest pain, palpitations and leg swelling.   Gastrointestinal:  Positive for abdominal pain, nausea and vomiting. Negative for abdominal distention and diarrhea.   Genitourinary:  Negative for dysuria and hematuria.   Musculoskeletal:  Negative for neck pain and neck stiffness.   Skin:  Negative for rash and wound.   Neurological:  Positive for weakness. Negative for seizures, syncope, numbness and headaches.   Psychiatric/Behavioral:  Positive for decreased concentration. Negative for confusion.    Objective:     Vital Signs (Most Recent):  Temp: 98.7 °F (37.1 °C) (06/09/23 1710)  Pulse: 91 (06/09/23 1933)  Resp: 18 (06/09/23 1933)  BP: (!) 174/101 (06/09/23 1933)  SpO2: 99 % (06/09/23 1933) Vital Signs (24h Range):  Temp:  [98.4 °F (36.9 °C)-98.7 °F (37.1 °C)] 98.7 °F (37.1 °C)  Pulse:  [] 91  Resp:  [18-20] 18  SpO2:  [99 %] 99 %  BP: (140-176)/() 174/101     Weight: 63.5 kg (140 lb)  Body mass index is 24.03 kg/m².     Physical Exam  Constitutional:       General: She is not in acute distress.     Appearance: She is not toxic-appearing or diaphoretic.   HENT:      Head: Normocephalic and atraumatic.      Nose: Nose normal.   Eyes:      General: No scleral icterus.     Extraocular Movements: Extraocular movements intact.      Pupils: Pupils are equal, round, and reactive to light.   Cardiovascular:      Rate and Rhythm: Regular rhythm. Tachycardia present.   Pulmonary:      Effort: Pulmonary effort is normal. No respiratory distress.      Breath sounds: No wheezing or rales.   Abdominal:      General: Abdomen is flat. There is no distension.       Palpations: Abdomen is soft.      Tenderness: There is no abdominal tenderness. There is no guarding.   Musculoskeletal:         General: Normal range of motion.      Cervical back: Normal range of motion and neck supple. No rigidity.      Right lower leg: No edema.      Left lower leg: No edema.   Skin:     General: Skin is warm and dry.      Coloration: Skin is not jaundiced.   Neurological:      General: No focal deficit present.      Mental Status: She is alert and oriented to person, place, and time.      Cranial Nerves: No cranial nerve deficit.   Psychiatric:         Mood and Affect: Mood normal.         Behavior: Behavior normal.            CRANIAL NERVES     CN III, IV, VI   Pupils are equal, round, and reactive to light.     Significant Labs: All pertinent labs within the past 24 hours have been reviewed.  CBC:   Recent Labs   Lab 06/09/23  1548   WBC 9.28   HGB 11.7*   HCT 37.9        CMP:   Recent Labs   Lab 06/09/23  1548   *   K 4.7   CL 87*   CO2 34*   *   BUN 23   CREATININE 1.7*   CALCIUM 10.3   PROT 8.6*   ALBUMIN 3.1*   BILITOT 1.0   ALKPHOS 301*   AST 34   ALT 32   ANIONGAP 12     Lipase:   Recent Labs   Lab 06/09/23  1548   LIPASE 2024*     Urine Studies: No results for input(s): COLORU, APPEARANCEUA, PHUR, SPECGRAV, PROTEINUA, GLUCUA, KETONESU, BILIRUBINUA, OCCULTUA, NITRITE, UROBILINOGEN, LEUKOCYTESUR, RBCUA, WBCUA, BACTERIA, SQUAMEPITHEL, HYALINECASTS in the last 48 hours.    Invalid input(s): WRIGHTSUR    Significant Imaging: I have reviewed all pertinent imaging results/findings within the past 24 hours.

## 2023-06-10 NOTE — PLAN OF CARE
[facility] - Telemetry Stepdown  Initial Discharge Assessment       Primary Care Provider: Jasbir Haney MD    Admission Diagnosis: Hyperglycemia [R73.9]  Chest pain [R07.9]  Non compliance w medication regimen [Z91.148]  Acute pancreatitis, unspecified complication status, unspecified pancreatitis type [K85.90]  Hyperosmolar hyperglycemic state (HHS) [E11.00]    Admission Date: 6/9/2023  Expected Discharge Date: [facility] at pt's bedside to complete discharge planning. Pt stated she is agreeable with receiving  education on how to care live with and control her diabetes.        [facility]  Case Management  Emergency Department  [phone]     Transition of Care Barriers: (P) None    Payor: BLUE CROSS BLUE SHIELD / Plan: BCBS ALL OUT OF STATE / Product Type: PPO /     Extended Emergency Contact Information  Primary Emergency Contact: Shamir Huff  Address: [address] [place] [address] [facility]  Home Phone: [phone]  Mobile Phone: [phone]  Relation: Spouse  Secondary Emergency Contact: Joseph Huff  Mobile Phone: [phone]  Relation: Son  Preferred language: English    Discharge Plan A: (P) Home with family  Discharge Plan B: (P) Home with family      [facility]  [address]  [place] [address]  Phone: [phone] Fax: [phone]    [facility]/pharmacy #[id number] - [address] AT CORNER OF [address] [address]  Phone: [phone] Fax: [phone]    [facility] - [place], [place] - [address] [place] [address]  [address] [place] [address]  Phone: [phone] Fax: [phone]      Initial Assessment (most recent)       Adult Discharge Assessment - 06/10/23 0212          Discharge Assessment    Assessment Type Discharge Planning Assessment (P)      Confirmed/corrected address, phone number and insurance Yes (P)      Confirmed Demographics Correct on Facesheet  (P)      Source of Information patient;family (P)      Does patient/caregiver understand observation status Yes (P)      Communicated REBECCA with patient/caregiver Date not available/Unable to determine (P)      People in Home spouse (P)      Do you expect to return to your current living situation? Yes (P)      Do you have help at home or someone to help you manage your care at home? Yes (P)      Who are your caregiver(s) and their phone number(s)? Franco De Dios [phone] (P)      Prior to hospitilization cognitive status: Alert/Oriented (P)      Current cognitive status: Alert/Oriented (P)      Walking or Climbing Stairs ambulation difficulty, requires equipment (P)      Mobility Management pt stated has and use a can to ambulate (P)      Home Accessibility wheelchair accessible (P)      Home Layout Able to live on 1st floor (P)      Equipment Currently Used at Home cane, straight (P)      Readmission within 30 days? Yes (P)      Patient currently being followed by outpatient case management? No (P)      Do you currently have service(s) that help you manage your care at home? No (P)      Do you take prescription medications? Yes (P)      Do you have prescription coverage? Yes (P)      Do you have any problems affording any of your prescribed medications? No (P)      Is the patient taking medications as prescribed? yes (P)      Who is going to help you get home at discharge? FRANCO DE DIOS  [phone] (P)      How do you get to doctors appointments? family or friend will provide (P)      Are you on dialysis? No (P)      Do you take coumadin? -- (P)    ELIQUIS    Discharge Plan A Home with family (P)      Discharge Plan B Home with family (P)      DME Needed Upon Discharge  none (P)      Discharge Plan discussed with: Patient;Spouse/sig other (P)      Transition of Care Barriers None (P)         Physical Activity    On average, how many days per week do you engage in moderate to strenuous exercise (like a brisk walk)? 4  days (P)      On average, how many minutes do you engage in exercise at this level? 60 min (P)         Financial Resource Strain    How hard is it for you to pay for the very basics like food, housing, medical care, and heating? Not hard at all (P)         Housing Stability    In the last 12 months, was there a time when you were not able to pay the mortgage or rent on time? No (P)      In the last 12 months, how many places have you lived? 1 (P)      In the last 12 months, was there a time when you did not have a steady place to sleep or slept in a shelter (including now)? No (P)         Transportation Needs    In the past 12 months, has lack of transportation kept you from medical appointments or from getting medications? No (P)      In the past 12 months, has lack of transportation kept you from meetings, work, or from getting things needed for daily living? No (P)         Food Insecurity    Within the past 12 months, you worried that your food would run out before you got the money to buy more. Never true (P)      Within the past 12 months, the food you bought just didn't last and you didn't have money to get more. Never true (P)         Stress    Do you feel stress - tense, restless, nervous, or anxious, or unable to sleep at night because your mind is troubled all the time - these days? To some extent (P)         Social Connections    In a typical week, how many times do you talk on the phone with family, friends, or neighbors? Once a week (P)      How often do you get together with friends or relatives? Once a week (P)      How often do you attend Taoism or Sikhism services? More than 4 times per year (P)      Do you belong to any clubs or organizations such as Taoism groups, unions, fraternal or athletic groups, or school groups? No (P)      How often do you attend meetings of the clubs or organizations you belong to? Never (P)      Are you , , , , never , or living  with a partner?  (P)         Alcohol Use    Q1: How often do you have a drink containing alcohol? Never (P)      Q3: How often do you have six or more drinks on one occasion? Never (P)

## 2023-06-10 NOTE — ASSESSMENT & PLAN NOTE
Creatinine 1.7 on presentation ; baseline 1.0  - IVFs stopped  - Cr improved to 1.3 after patient received fluids  - avoid nephrotoxic agents as appropriate  - continue to monitor

## 2023-06-10 NOTE — ASSESSMENT & PLAN NOTE
Had an episode of pancreatitis in the past also attributed to increasing Trulcity dose. Trulicity was continued vs restarted after that and then she presented to the ED for 2nd episode of pancreatitis requiring hospitalization in 5/2023, discharged on 5/26/2026   Trulicity was stopped upon that d/c  She has now had two episodes of pancreatitis on incretin, would recommend avoiding all incretins in the future

## 2023-06-10 NOTE — CONSULTS
Nutrition-Related Diabetes Education      Time Spent: 15 min    Learners: Family    Current HbA1c: 8.1    Nutrition Education with handouts: Pt not present in room during RD visits. Educated  on CHO counting for people with diabetes. Family verbalized understanding. Emphasized the importance of diet adherence. Family with no additional questions. No other needs identified. Left all education material with pt at bedside.    Barriers to Learning: No    Follow up: Yes    Please consult as needed.  Thank you!

## 2023-06-11 LAB
ALBUMIN SERPL BCP-MCNC: 2.5 G/DL (ref 3.5–5.2)
ALP SERPL-CCNC: 338 U/L (ref 55–135)
ALT SERPL W/O P-5'-P-CCNC: 51 U/L (ref 10–44)
ANION GAP SERPL CALC-SCNC: 14 MMOL/L (ref 8–16)
AST SERPL-CCNC: 91 U/L (ref 10–40)
BASOPHILS # BLD AUTO: 0.07 K/UL (ref 0–0.2)
BASOPHILS NFR BLD: 0.8 % (ref 0–1.9)
BILIRUB SERPL-MCNC: 2.2 MG/DL (ref 0.1–1)
BUN SERPL-MCNC: 20 MG/DL (ref 8–23)
CALCIUM SERPL-MCNC: 9.5 MG/DL (ref 8.7–10.5)
CHLORIDE SERPL-SCNC: 97 MMOL/L (ref 95–110)
CO2 SERPL-SCNC: 26 MMOL/L (ref 23–29)
CREAT SERPL-MCNC: 1.3 MG/DL (ref 0.5–1.4)
DIFFERENTIAL METHOD: ABNORMAL
EOSINOPHIL # BLD AUTO: 0.5 K/UL (ref 0–0.5)
EOSINOPHIL NFR BLD: 5.7 % (ref 0–8)
ERYTHROCYTE [DISTWIDTH] IN BLOOD BY AUTOMATED COUNT: 12.9 % (ref 11.5–14.5)
EST. GFR  (NO RACE VARIABLE): 46.5 ML/MIN/1.73 M^2
GLUCOSE SERPL-MCNC: 198 MG/DL (ref 70–110)
HCT VFR BLD AUTO: 36.8 % (ref 37–48.5)
HGB BLD-MCNC: 10.7 G/DL (ref 12–16)
IMM GRANULOCYTES # BLD AUTO: 0.01 K/UL (ref 0–0.04)
IMM GRANULOCYTES NFR BLD AUTO: 0.1 % (ref 0–0.5)
LYMPHOCYTES # BLD AUTO: 1.9 K/UL (ref 1–4.8)
LYMPHOCYTES NFR BLD: 22.4 % (ref 18–48)
MAGNESIUM SERPL-MCNC: 2.4 MG/DL (ref 1.6–2.6)
MCH RBC QN AUTO: 26.2 PG (ref 27–31)
MCHC RBC AUTO-ENTMCNC: 29.1 G/DL (ref 32–36)
MCV RBC AUTO: 90 FL (ref 82–98)
MONOCYTES # BLD AUTO: 0.9 K/UL (ref 0.3–1)
MONOCYTES NFR BLD: 10.3 % (ref 4–15)
NEUTROPHILS # BLD AUTO: 5.2 K/UL (ref 1.8–7.7)
NEUTROPHILS NFR BLD: 60.7 % (ref 38–73)
NRBC BLD-RTO: 0 /100 WBC
PHOSPHATE SERPL-MCNC: 2.9 MG/DL (ref 2.7–4.5)
PLATELET # BLD AUTO: 296 K/UL (ref 150–450)
PMV BLD AUTO: 12.6 FL (ref 9.2–12.9)
POCT GLUCOSE: 207 MG/DL (ref 70–110)
POCT GLUCOSE: 253 MG/DL (ref 70–110)
POCT GLUCOSE: 262 MG/DL (ref 70–110)
POCT GLUCOSE: 342 MG/DL (ref 70–110)
POCT GLUCOSE: 359 MG/DL (ref 70–110)
POTASSIUM SERPL-SCNC: 3.7 MMOL/L (ref 3.5–5.1)
PROT SERPL-MCNC: 6.8 G/DL (ref 6–8.4)
RBC # BLD AUTO: 4.09 M/UL (ref 4–5.4)
SODIUM SERPL-SCNC: 137 MMOL/L (ref 136–145)
WBC # BLD AUTO: 8.48 K/UL (ref 3.9–12.7)

## 2023-06-11 PROCEDURE — 20600001 HC STEP DOWN PRIVATE ROOM

## 2023-06-11 PROCEDURE — 83735 ASSAY OF MAGNESIUM: CPT | Performed by: FAMILY MEDICINE

## 2023-06-11 PROCEDURE — 25500020 PHARM REV CODE 255: Performed by: STUDENT IN AN ORGANIZED HEALTH CARE EDUCATION/TRAINING PROGRAM

## 2023-06-11 PROCEDURE — 80053 COMPREHEN METABOLIC PANEL: CPT | Performed by: STUDENT IN AN ORGANIZED HEALTH CARE EDUCATION/TRAINING PROGRAM

## 2023-06-11 PROCEDURE — 99233 PR SUBSEQUENT HOSPITAL CARE,LEVL III: ICD-10-PCS | Mod: ,,, | Performed by: STUDENT IN AN ORGANIZED HEALTH CARE EDUCATION/TRAINING PROGRAM

## 2023-06-11 PROCEDURE — 85025 COMPLETE CBC W/AUTO DIFF WBC: CPT | Performed by: FAMILY MEDICINE

## 2023-06-11 PROCEDURE — A9585 GADOBUTROL INJECTION: HCPCS | Performed by: STUDENT IN AN ORGANIZED HEALTH CARE EDUCATION/TRAINING PROGRAM

## 2023-06-11 PROCEDURE — 25000003 PHARM REV CODE 250: Performed by: FAMILY MEDICINE

## 2023-06-11 PROCEDURE — 84100 ASSAY OF PHOSPHORUS: CPT | Performed by: FAMILY MEDICINE

## 2023-06-11 PROCEDURE — 36415 COLL VENOUS BLD VENIPUNCTURE: CPT | Performed by: FAMILY MEDICINE

## 2023-06-11 PROCEDURE — 99232 PR SUBSEQUENT HOSPITAL CARE,LEVL II: ICD-10-PCS | Mod: ,,, | Performed by: GENERAL ACUTE CARE HOSPITAL

## 2023-06-11 PROCEDURE — 99232 SBSQ HOSP IP/OBS MODERATE 35: CPT | Mod: ,,, | Performed by: GENERAL ACUTE CARE HOSPITAL

## 2023-06-11 PROCEDURE — 25000003 PHARM REV CODE 250: Performed by: STUDENT IN AN ORGANIZED HEALTH CARE EDUCATION/TRAINING PROGRAM

## 2023-06-11 PROCEDURE — 99233 SBSQ HOSP IP/OBS HIGH 50: CPT | Mod: ,,, | Performed by: STUDENT IN AN ORGANIZED HEALTH CARE EDUCATION/TRAINING PROGRAM

## 2023-06-11 RX ORDER — INSULIN ASPART 100 [IU]/ML
5 INJECTION, SOLUTION INTRAVENOUS; SUBCUTANEOUS
Status: DISCONTINUED | OUTPATIENT
Start: 2023-06-11 | End: 2023-06-11

## 2023-06-11 RX ORDER — INSULIN ASPART 100 [IU]/ML
4 INJECTION, SOLUTION INTRAVENOUS; SUBCUTANEOUS
Status: DISCONTINUED | OUTPATIENT
Start: 2023-06-11 | End: 2023-06-11

## 2023-06-11 RX ORDER — GADOBUTROL 604.72 MG/ML
10 INJECTION INTRAVENOUS
Status: COMPLETED | OUTPATIENT
Start: 2023-06-11 | End: 2023-06-11

## 2023-06-11 RX ORDER — FAMOTIDINE 20 MG/1
20 TABLET, FILM COATED ORAL DAILY
Status: DISCONTINUED | OUTPATIENT
Start: 2023-06-12 | End: 2023-06-25 | Stop reason: HOSPADM

## 2023-06-11 RX ORDER — INSULIN ASPART 100 [IU]/ML
7 INJECTION, SOLUTION INTRAVENOUS; SUBCUTANEOUS
Status: DISCONTINUED | OUTPATIENT
Start: 2023-06-12 | End: 2023-06-12

## 2023-06-11 RX ADMIN — INSULIN ASPART 3 UNITS: 100 INJECTION, SOLUTION INTRAVENOUS; SUBCUTANEOUS at 08:06

## 2023-06-11 RX ADMIN — CILOSTAZOL 100 MG: 100 TABLET ORAL at 09:06

## 2023-06-11 RX ADMIN — INSULIN ASPART 5 UNITS: 100 INJECTION, SOLUTION INTRAVENOUS; SUBCUTANEOUS at 12:06

## 2023-06-11 RX ADMIN — INSULIN ASPART 5 UNITS: 100 INJECTION, SOLUTION INTRAVENOUS; SUBCUTANEOUS at 05:06

## 2023-06-11 RX ADMIN — FAMOTIDINE 20 MG: 20 TABLET, FILM COATED ORAL at 09:06

## 2023-06-11 RX ADMIN — APIXABAN 2.5 MG: 2.5 TABLET, FILM COATED ORAL at 09:06

## 2023-06-11 RX ADMIN — Medication 6 MG: at 10:06

## 2023-06-11 RX ADMIN — ATORVASTATIN CALCIUM 40 MG: 20 TABLET, FILM COATED ORAL at 09:06

## 2023-06-11 RX ADMIN — INSULIN ASPART 4 UNITS: 100 INJECTION, SOLUTION INTRAVENOUS; SUBCUTANEOUS at 05:06

## 2023-06-11 RX ADMIN — ASPIRIN 81 MG 81 MG: 81 TABLET ORAL at 09:06

## 2023-06-11 RX ADMIN — INSULIN ASPART 4 UNITS: 100 INJECTION, SOLUTION INTRAVENOUS; SUBCUTANEOUS at 12:06

## 2023-06-11 RX ADMIN — ISOSORBIDE MONONITRATE 20 MG: 20 TABLET ORAL at 08:06

## 2023-06-11 RX ADMIN — GABAPENTIN 600 MG: 300 CAPSULE ORAL at 09:06

## 2023-06-11 RX ADMIN — METOPROLOL TARTRATE 50 MG: 50 TABLET, FILM COATED ORAL at 08:06

## 2023-06-11 RX ADMIN — AMLODIPINE BESYLATE 10 MG: 10 TABLET ORAL at 08:06

## 2023-06-11 RX ADMIN — GABAPENTIN 300 MG: 300 CAPSULE ORAL at 09:06

## 2023-06-11 RX ADMIN — GADOBUTROL 10 ML: 604.72 INJECTION INTRAVENOUS at 09:06

## 2023-06-11 RX ADMIN — METOPROLOL TARTRATE 50 MG: 50 TABLET, FILM COATED ORAL at 09:06

## 2023-06-11 NOTE — PROGRESS NOTES
Pharmacist Renal Dose Adjustment Note    Mariann Huff is a 63 y.o. female being treated with the medication famotidine    Patient Data:    Vital Signs (Most Recent):  Temp: 98.9 °F (37.2 °C) (06/11/23 0734)  Pulse: 65 (06/11/23 1025)  Resp: 18 (06/11/23 0734)  BP: 127/61 (06/11/23 1025)  SpO2: 96 % (06/11/23 0734) Vital Signs (72h Range):  Temp:  [97.1 °F (36.2 °C)-98.9 °F (37.2 °C)]   Pulse:  []   Resp:  [17-20]   BP: ()/()   SpO2:  [93 %-100 %]      Recent Labs   Lab 06/10/23  0017 06/10/23  0336 06/11/23  0405   CREATININE 1.4 1.4 1.3     Serum creatinine: 1.3 mg/dL 06/11/23 0405  Estimated creatinine clearance: 38.7 mL/min    Change to famotidine 20 mg daily for CrCl < 50    Pharmacist's Name: Mathew Choi  Pharmacist's Extension: [phone]

## 2023-06-11 NOTE — PLAN OF CARE
Pt AAOx4, no c/o of pain reported. Insulin Gtt has been d/c, transition to insulin Levemir over night by Dr Jean-Baptiste. VSS, no acute distress noted. Cont to monitor.    Problem: Adult Inpatient Plan of Care  Goal: Plan of Care Review  Outcome: Ongoing, Progressing  Goal: Absence of Hospital-Acquired Illness or Injury  Outcome: Ongoing, Progressing  Goal: Optimal Comfort and Wellbeing  Outcome: Ongoing, Progressing  Goal: Readiness for Transition of Care  Outcome: Ongoing, Progressing     Problem: Diabetic Ketoacidosis  Goal: Fluid and Electrolyte Balance with Absence of Ketosis  Outcome: Ongoing, Progressing     Problem: Diabetes Comorbidity  Goal: Blood Glucose Level Within Targeted Range  Outcome: Ongoing, Progressing     Problem: Renal Function Impairment (Acute Kidney Injury/Impairment)  Goal: Effective Renal Function  Outcome: Ongoing, Progressing

## 2023-06-11 NOTE — PROGRESS NOTES
"[facility] - Telemetry Stepdown  Endocrinology  Progress Note    Admit Date: 6/9/2023     Reason for Consult: Management of T2DM, Hyperglycemia, HHS on admission    Surgical Procedure and Date: n/a    Diabetes diagnosis year: pt not sure - states "long time ago"; at least as far back as 2004 per review of elevated a1c in epic    Home Diabetes Medications:  Farxiga 10mg qd, Glipizide ER 10mg before breakfast. Levemir 20u qhs prescribed by her PCP on 5/31/2023, but pt did not  Rx/did not start this    She states she has been on MDI insulin in the past, most recently she was on Toujeo and Humalog that appear to have been discontinued around 9/2021. However pt states she was on insulin up until approx 10mo ago.    How often checking glucose at home?  Usually 2x/day, not checking at all in last few days due to feeling bad    BG readings on regimen: 130s-170s ac lunch and bedtime per pt  Hypoglycemia on the regimen?  No  Missed doses on regimen?  Yes; not taking levemir as described above. She reports no missed doses of Farxiga or Glipizide    Diabetes Complications include:     Hyperglycemia, Diabetic chronic kidney disease     , Diabetic peripheral neuropathy , and Diabetic peripheral angiopathy with/without gangrene    Complicating diabetes co morbidities:   History of MI, MURTAZA, and CKD      HPI:   Patient is a 63 y.o. female with CAD with GINGER , HTN, HLD, DM2, CKD, MURTAZA (not on CPAP), PAD, Afib and hx of DVT on Eliquis and recent admission for pancreatitis wo presented to the ED on 6/9/2023 with nausea and vomiting. Patient recently admitted 05/25 to 05/26 for acute pancreatitis attributed to Trulicity, which was discontinued upon d/c. Per pt's PCP this was 2nd episode of pancreatitis while on trulicity -- prior episode when increasing dose of Trulicity. Pt states upon d/c 5/26 she was feeling slight improvement but still having intermittent n/v and not tolerating normal diet. N/V began to get worse in last few days " "which prompted admission. No abdominal pain on presentation for current admission, unlike last admit when she had severe abd pain for pancreatitis.    In the ED patient afebrile and hemodynamically stable saturating well on room air. Lipase >2000. BG >700 and serum osmo 326 after 1L IVFs. B-hydroxybutyrate 1.3. pH 7.4 and bicarb 34. AG 12. Patient started on aggressive IVFs and insulin gtt for HHS. Admitted to hospital medicine. Endocrinology consulted for HHS/BG management.       Interval HPI:   Overnight events: BG at goal on transition gtt and subq prandial yesterday. Switched transition gtt to levemir last night. BG 200s-260s o/n and this AM. Increasing basal and prandial insulin as outlined below.  Eatin%  Nausea: No  Hypoglycemia and intervention: No  Fever: No  TPN and/or TF: No  If yes, type of TF/TPN and rate: n/a    BP (!) 116/59 (BP Location: Right arm, Patient Position: Lying)   Pulse 77   Temp 97.7 °F (36.5 °C) (Oral)   Resp 19   Ht 5' 4" (1.626 m)   Wt 62.8 kg (138 lb 7.2 oz)   LMP  (LMP Unknown)   SpO2 99%   BMI 23.76 kg/m²     Labs Reviewed and Include    Recent Labs   Lab 23  0405   *   CALCIUM 9.5   ALBUMIN 2.5*   PROT 6.8      K 3.7   CO2 26   CL 97   BUN 20   CREATININE 1.3   ALKPHOS 338*   ALT 51*   AST 91*   BILITOT 2.2*     Lab Results   Component Value Date    WBC 8.48 2023    HGB 10.7 (L) 2023    HCT 36.8 (L) 2023    MCV 90 2023     2023     No results for input(s): TSH, FREET4 in the last 168 hours.  Lab Results   Component Value Date    HGBA1C 8.1 (H) 2023       Nutritional status:   Body mass index is 23.76 kg/m².  Lab Results   Component Value Date    ALBUMIN 2.5 (L) 2023    ALBUMIN 3.1 (L) 2023    ALBUMIN 3.3 (L) 2023     No results found for: PREALBUMIN    Estimated Creatinine Clearance: 38.7 mL/min (based on SCr of 1.3 mg/dL).    Accu-Checks  Recent Labs     06/10/23  0927 06/10/23  1032 " 06/10/23  1118 06/10/23  1317 06/10/23  1459 06/10/23  1630 06/10/23  1731 06/10/23  2048 06/10/23  2345 06/11/23  0737   POCTGLUCOSE 229* 243* 191* 135* 139* 149* 144* 208* 207* 262*       Current Medications and/or Treatments Impacting Glycemic Control  Immunotherapy:    Immunosuppressants       None          Steroids:   Hormones (From admission, onward)      Start     Stop Route Frequency Ordered    06/09/23 2028  melatonin tablet 6 mg         -- Oral Nightly PRN 06/09/23 1931          Pressors:    Autonomic Drugs (From admission, onward)      None          Hyperglycemia/Diabetes Medications:   Antihyperglycemics (From admission, onward)      Start     Stop Route Frequency Ordered    06/11/23 2100  insulin detemir U-100 (Levemir) pen 16 Units         -- SubQ Nightly 06/11/23 1028    06/11/23 1645  insulin aspart U-100 pen 5 Units         -- SubQ 3 times daily with meals 06/11/23 1437    06/10/23 1315  insulin regular in 0.9 % NaCl 100 unit/100 mL (1 unit/mL) infusion        Question:  Enter initial dose (Units/hr):  Answer:  0.6    06/10 2200 IV Continuous 06/10/23 1211    06/10/23 1309  insulin aspart U-100 pen 0-5 Units         -- SubQ As needed (PRN) 06/10/23 1211            ASSESSMENT and PLAN    Cardiac/Vascular  Hyperlipidemia associated with type 2 diabetes mellitus  On Lipitor 40mg and Repatha outpatient  Lipitor continued inpatient by primary team  Management per primary      Hypertension associated with diabetes  Hypertensive on admission  On multiple anti-HTNs outpatient  Management per primary      Renal/  PAPA (acute kidney injury)  Likely 2/2 IVVD 2/2 HHS on admission  Baseline Cr appears to be around 1.1-1.2  Improved/resolved   Management per primary      Endocrine  * Type 2 diabetes mellitus with hyperosmolar hyperglycemic state (HHS)  HHS on admission. Recent pancreatitis admission, not yet back to normal po intake. Taking Glipizide and Farxiga at home. Never picked up Levemir Rx recently  prescribed by her PCP.     Endocrinology consulted for BG management.   BG goal 140-180     BG controlled on transition gtt at 0.6 u/hr and Novolog 3u ac + LDC, however since switching gtt to levemir BG mostly 200s. Will increase MDI doses as outlined below.    - Increase Levemir to 16u qhs  - Increase Novolog to 5u ac   - Continue Novolog low dose correction ac/hs or q4h if not eating meals   - BG checks ac/hs. If not eating meals for any reason (nausea, NPO, etc) please do accuchecks q4h  - Hypoglycemia protocol in place    ** Please notify Endocrine for any change and/or advance in diet**  ** Please call Endocrine for any BG related issues **    Discharge Planning:   TBD. Please notify endocrinology prior to discharge.  I did discuss with the pt that in the future, she should hold Farxiga if having n/v/decreased po intake. Additionally should hold for 3d prior to any planned procedure. Pt states she was unaware of this.  Due to pancreatitis on incretin, would not restart this at d/c.    GI  History of pancreatitis  Had an episode of pancreatitis in the past also attributed to increasing Trulcity dose. Trulicity was continued vs restarted after that and then she presented to the ED for 2nd episode of pancreatitis requiring hospitalization in 5/2023, discharged on 5/26/2026   Trulicity was stopped upon that d/c  She has now had two episodes of pancreatitis on incretin, however now there is suspicion for choledocholithiasis as cause for pancreatitis with MRCP pending  Would still hold incretin at d/c        Jerilyn Harrison MD  Endocrinology  [facility] - Telemetry Stepdown

## 2023-06-11 NOTE — ASSESSMENT & PLAN NOTE
On Lipitor 40mg and Repatha outpatient  Lipitor continued inpatient by primary team  Management per primary

## 2023-06-11 NOTE — PROGRESS NOTES
[facility] - Telemetry [facility] Medicine  Progress Note    Patient Name: Mariann Huff  MRN: [id number]  Patient Class: IP- Inpatient   Admission Date: 6/9/2023  Length of Stay: 2 days  Attending Physician: Gregory Landers MD  Primary Care Provider: Jasbir Haney MD        Subjective:     Principal Problem:Type 2 diabetes mellitus with hyperosmolar hyperglycemic state (HHS)        HPI:  63F with PMH of CAD with GINGER , HTN, HLD, DM2, MURTAZA (not on CPAP), PAD, Afib and hx of DVT on Eliquis and recent admission for pancreatitis wo presents to the ED with nausea and vomiting. Patient recently admitted 05/25 to 05/26 for first episode of acute pancreatitis. Lipase >2000 at that time. CT abdomen and US without evidence of biliary obstruction or pancreatic inflammation. Lipid panel wnl. Patient pain and n/v improved with treatment for acute pancreatitis felt secondary to home med Trulicity. She was discharged with new insulin regimen which she reports she never picked up from pharmacy so has been taking farxiga only at home. States that she has had progressively worsening n/v and po intolerance. States that she is not having severe pain similar to recent pancreatitis. Denies fevers, chills, chest pain, shortness of breath.     In the ED patient afebrile and hemodynamically stable saturating well on room air. Lipase >2000. BG >700 and serum osmo 326 after 1L IVFs. B-hydroxybutyrate 1.3. pH 7.4 and bicarb 34. AG 12. Patient started on aggressive IVFs and insulin gtt for management on pancreatitis and HHS. Admitted to the care of medicine for further evaluation and management.       Overview/Hospital Course:  Patient transitioned from insulin drip to subcutaneous insulin on 06/10.  On 06/11, patient developed new hyperbilirubinemia and worsening liver enzymes and given history of pancreatitis concern for possible choledocholithiasis.  MRCP ordered      Interval History: No acute events overnight. Patient lab work this  morning with elevated bilirubin, increased alk-phos and increased liver enzymes.  Patient denies any abdominal pain.  She was curious when she can go home.  MRCP ordered this morning to evaluate for possible choledocholithiasis    Review of Systems   Constitutional:  Negative for chills and fever.   HENT:  Negative for congestion and sore throat.    Eyes:  Negative for photophobia and visual disturbance.   Respiratory:  Negative for cough and shortness of breath.    Cardiovascular:  Negative for chest pain and palpitations.   Gastrointestinal:  Negative for abdominal pain, constipation, diarrhea, nausea and vomiting.   Endocrine: Negative for cold intolerance and heat intolerance.   Genitourinary:  Negative for dysuria and hematuria.   Musculoskeletal:  Negative for arthralgias and myalgias.   Skin:  Negative for rash.   Allergic/Immunologic: Negative for environmental allergies and food allergies.   Neurological:  Negative for dizziness, seizures, syncope and headaches.   Hematological:  Negative for adenopathy. Does not bruise/bleed easily.   Psychiatric/Behavioral:  Negative for hallucinations and suicidal ideas.    Objective:     Vital Signs (Most Recent):  Temp: 98.9 °F (37.2 °C) (06/11/23 0734)  Pulse: 66 (06/11/23 0734)  Resp: 18 (06/11/23 0734)  BP: (!) 189/84 (06/11/23 0734)  SpO2: 96 % (06/11/23 0734) Vital Signs (24h Range):  Temp:  [97.4 °F (36.3 °C)-98.9 °F (37.2 °C)] 98.9 °F (37.2 °C)  Pulse:  [58-69] 66  Resp:  [17-20] 18  SpO2:  [94 %-99 %] 96 %  BP: ()/(51-84) 189/84     Weight: 62.8 kg (138 lb 7.2 oz)  Body mass index is 23.76 kg/m².    Intake/Output Summary (Last 24 hours) at 6/11/2023 1024  Last data filed at 6/10/2023 1706  Gross per 24 hour   Intake 300 ml   Output 150 ml   Net 150 ml           Physical Exam  Constitutional:       Appearance: She is well-developed.   HENT:      Head: Normocephalic and atraumatic.   Eyes:      Conjunctiva/sclera: Conjunctivae normal.      Pupils: Pupils  are equal, round, and reactive to light.   Neck:      Thyroid: No thyromegaly.      Vascular: No JVD.   Cardiovascular:      Rate and Rhythm: Normal rate and regular rhythm.      Heart sounds: Normal heart sounds. No murmur heard.    No friction rub. No gallop.   Pulmonary:      Effort: Pulmonary effort is normal.      Breath sounds: Normal breath sounds. No wheezing or rales.   Abdominal:      General: Bowel sounds are normal. There is no distension.      Palpations: Abdomen is soft.      Tenderness: There is no abdominal tenderness. There is no guarding or rebound.   Musculoskeletal:         General: No tenderness. Normal range of motion.      Cervical back: Neck supple.   Skin:     General: Skin is warm and dry.   Neurological:      Mental Status: She is alert and oriented to person, place, and time.      Cranial Nerves: No cranial nerve deficit.   Psychiatric:         Behavior: Behavior normal.           Significant Labs: All pertinent labs within the past 24 hours have been reviewed.  CMP:   Recent Labs   Lab 06/09/23  1548 06/09/23  2004 06/10/23  0017 06/10/23  0336 06/11/23  0405   *   < > 147* 144 137   K 4.7   < > 3.9 4.0 3.7   CL 87*   < > 100 102 97   CO2 34*   < > 31* 31* 26   *   < > 155* 214* 198*   BUN 23   < > 21 20 20   CREATININE 1.7*   < > 1.4 1.4 1.3   CALCIUM 10.3   < > 11.0* 10.4 9.5   PROT 8.6*  --   --   --  6.8   ALBUMIN 3.1*  --   --   --  2.5*   BILITOT 1.0  --   --   --  2.2*   ALKPHOS 301*  --   --   --  338*   AST 34  --   --   --  91*   ALT 32  --   --   --  51*   ANIONGAP 12   < > 16 11 14    < > = values in this interval not displayed.         Significant Imaging: I have reviewed all pertinent imaging results/findings within the past 24 hours.      Assessment/Plan:      * Type 2 diabetes mellitus with hyperosmolar hyperglycemic state (HHS)  IP HHS/DKA Pathway initiated. On presentation BG >700 and serum osmo 326 after 1L IVFs. B-hydroxybutyrate 1.3. pH 7.4 and bicarb  "34. AG 12.  - 1L NS bolus x2  - start insulin gtt transition to regular insulin gtt by endocrine.  Later in the day, patient transitioned to Levemir 14 units at night and aspart 3 units with meals.  Started on diabetic diet  - further insulin titration per endocrinology  - POC AC/HS      History of DVT (deep vein thrombosis)  - continue home Eliquis      Acute pancreatitis  Lipase >2000 on presentation, downtrending without any identifiable cause for elevation. Patient without any abdominal pain.  - US abdomen with gallbladder sludge and "prominence" of the pancreatic duct   - liver enzymes, alk-phos, bilirubin elevated on 06/11, suggestive of possible choledocholithiasis as cause of pancreatitis.  MRCP ordered  - discontinue HCTZ as it could potentially be cause of pancreatitis as well      PAD (peripheral artery disease)  - continue home pletal and ASA and statin      Asthma  - albuterol prn      PAPA (acute kidney injury)  Creatinine 1.7 on presentation ; baseline 1.0  - IVFs stopped  - Cr improved to 1.3 after patient received fluids  - avoid nephrotoxic agents as appropriate  - continue to monitor      Hypertension associated with diabetes  - continue home amlodipine, and metoprolol  - holding home losartan for PAPA  - discontinued HCTZ altogether given concerns of recurrent pancreatitis  - hydralazine prn      VTE Risk Mitigation (From admission, onward)         Ordered     apixaban tablet 2.5 mg  2 times daily         06/09/23 1920                Discharge Planning   REBECCA: 6/12/2023     Code Status: Full Code   Is the patient medically ready for discharge?: No    Reason for patient still in hospital (select all that apply): Patient trending condition  Discharge Plan A: Home with family   Discharge Delays: None known at this time              Gregory Landers MD  Department of Hospital Medicine   [facility] - Telemetry Stepdown    "

## 2023-06-11 NOTE — ASSESSMENT & PLAN NOTE
- continue home amlodipine, and metoprolol  - holding home losartan for PAPA  - discontinued HCTZ altogether given concerns of recurrent pancreatitis  - hydralazine prn

## 2023-06-11 NOTE — ASSESSMENT & PLAN NOTE
Called [facility] and let her know that we do have labs for Dr. Beaulieu to review.     Lyssa Miramontes, CHACHON, RN   Rheumatology Care Coordinator   [facility]     IP HHS/DKA Pathway initiated. On presentation BG >700 and serum osmo 326 after 1L IVFs. B-hydroxybutyrate 1.3. pH 7.4 and bicarb 34. AG 12.  - 1L NS bolus x2  - start insulin gtt transition to regular insulin gtt by endocrine.  Later in the day, patient transitioned to Levemir 14 units at night and aspart 3 units with meals.  Started on diabetic diet  - further insulin titration per endocrinology  - POC AC/HS

## 2023-06-11 NOTE — SUBJECTIVE & OBJECTIVE
"Interval HPI:   Overnight events: BG at goal on transition gtt and subq prandial yesterday. Switched transition gtt to levemir last night. BG 200s-260s o/n and this AM. Increasing basal and prandial insulin as outlined below.  Eatin%  Nausea: No  Hypoglycemia and intervention: No  Fever: No  TPN and/or TF: No  If yes, type of TF/TPN and rate: n/a    BP (!) 116/59 (BP Location: Right arm, Patient Position: Lying)   Pulse 77   Temp 97.7 °F (36.5 °C) (Oral)   Resp 19   Ht 5' 4" (1.626 m)   Wt 62.8 kg (138 lb 7.2 oz)   LMP  (LMP Unknown)   SpO2 99%   BMI 23.76 kg/m²     Labs Reviewed and Include    Recent Labs   Lab 23  0405   *   CALCIUM 9.5   ALBUMIN 2.5*   PROT 6.8      K 3.7   CO2 26   CL 97   BUN 20   CREATININE 1.3   ALKPHOS 338*   ALT 51*   AST 91*   BILITOT 2.2*     Lab Results   Component Value Date    WBC 8.48 2023    HGB 10.7 (L) 2023    HCT 36.8 (L) 2023    MCV 90 2023     2023     No results for input(s): TSH, FREET4 in the last 168 hours.  Lab Results   Component Value Date    HGBA1C 8.1 (H) 2023       Nutritional status:   Body mass index is 23.76 kg/m².  Lab Results   Component Value Date    ALBUMIN 2.5 (L) 2023    ALBUMIN 3.1 (L) 2023    ALBUMIN 3.3 (L) 2023     No results found for: PREALBUMIN    Estimated Creatinine Clearance: 38.7 mL/min (based on SCr of 1.3 mg/dL).    Accu-Checks  Recent Labs     06/10/23  0927 06/10/23  1032 06/10/23  1118 06/10/23  1317 06/10/23  1459 06/10/23  1630 06/10/23  1731 06/10/23  2048 06/10/23  2345 23  0737   POCTGLUCOSE 229* 243* 191* 135* 139* 149* 144* 208* 207* 262*       Current Medications and/or Treatments Impacting Glycemic Control  Immunotherapy:    Immunosuppressants       None          Steroids:   Hormones (From admission, onward)      Start     Stop Route Frequency Ordered    23  melatonin tablet 6 mg         -- Oral Nightly PRN 23      " Incoming fax received from [facility]. Medication approved until 3/15/2018. reference number: [id number].   Detailed message left on patient VM     Pressors:    Autonomic Drugs (From admission, onward)      None          Hyperglycemia/Diabetes Medications:   Antihyperglycemics (From admission, onward)      Start     Stop Route Frequency Ordered    06/11/23 2100  insulin detemir U-100 (Levemir) pen 16 Units         -- SubQ Nightly 06/11/23 1028    06/11/23 1645  insulin aspart U-100 pen 5 Units         -- SubQ 3 times daily with meals 06/11/23 1437    06/10/23 1315  insulin regular in 0.9 % NaCl 100 unit/100 mL (1 unit/mL) infusion        Question:  Enter initial dose (Units/hr):  Answer:  0.6    06/10 2200 IV Continuous 06/10/23 1211    06/10/23 1309  insulin aspart U-100 pen 0-5 Units         -- SubQ As needed (PRN) 06/10/23 1211

## 2023-06-11 NOTE — ASSESSMENT & PLAN NOTE
Had an episode of pancreatitis in the past also attributed to increasing Trulcity dose. Trulicity was continued vs restarted after that and then she presented to the ED for 2nd episode of pancreatitis requiring hospitalization in 5/2023, discharged on 5/26/2026   Trulicity was stopped upon that d/c  She has now had two episodes of pancreatitis on incretin, however now there is suspicion for choledocholithiasis as cause for pancreatitis with MRCP pending  Would still hold incretin at d/c

## 2023-06-11 NOTE — ASSESSMENT & PLAN NOTE
HHS on admission. Recent pancreatitis admission, not yet back to normal po intake. Taking Glipizide and Farxiga at home. Never picked up Levemir Rx recently prescribed by her PCP.     Endocrinology consulted for BG management.   BG goal 140-180     BG controlled on transition gtt at 0.6 u/hr and Novolog 3u ac + LDC, however since switching gtt to levemir BG mostly 200s. Will increase MDI doses as outlined below.    - Increase Levemir to 16u qhs  - Increase Novolog to 5u ac   - Continue Novolog low dose correction ac/hs or q4h if not eating meals   - BG checks ac/hs. If not eating meals for any reason (nausea, NPO, etc) please do accuchecks q4h  - Hypoglycemia protocol in place    ** Please notify Endocrine for any change and/or advance in diet**  ** Please call Endocrine for any BG related issues **    Discharge Planning:   TBD. Please notify endocrinology prior to discharge.  I did discuss with the pt that in the future, she should hold Farxiga if having n/v/decreased po intake. Additionally should hold for 3d prior to any planned procedure. Pt states she was unaware of this.  Due to pancreatitis on incretin, would not restart this at d/c.

## 2023-06-11 NOTE — HOSPITAL COURSE
Patient transitioned from insulin drip to subcutaneous insulin on 06/10.  On 06/11, patient developed new hyperbilirubinemia and worsening liver enzymes and given history of pancreatitis concern for possible choledocholithiasis.  MRCP ordered did not demonstrate any focal obstruction showever liver enzymes and alk phos increasing in size. In speaking with AES, patient to be off apixaban and pletal prior to ERCP.

## 2023-06-11 NOTE — SUBJECTIVE & OBJECTIVE
Interval History: No acute events overnight. Patient lab work this morning with elevated bilirubin, increased alk-phos and increased liver enzymes.  Patient denies any abdominal pain.  She was curious when she can go home.  MRCP ordered this morning to evaluate for possible choledocholithiasis    Review of Systems   Constitutional:  Negative for chills and fever.   HENT:  Negative for congestion and sore throat.    Eyes:  Negative for photophobia and visual disturbance.   Respiratory:  Negative for cough and shortness of breath.    Cardiovascular:  Negative for chest pain and palpitations.   Gastrointestinal:  Negative for abdominal pain, constipation, diarrhea, nausea and vomiting.   Endocrine: Negative for cold intolerance and heat intolerance.   Genitourinary:  Negative for dysuria and hematuria.   Musculoskeletal:  Negative for arthralgias and myalgias.   Skin:  Negative for rash.   Allergic/Immunologic: Negative for environmental allergies and food allergies.   Neurological:  Negative for dizziness, seizures, syncope and headaches.   Hematological:  Negative for adenopathy. Does not bruise/bleed easily.   Psychiatric/Behavioral:  Negative for hallucinations and suicidal ideas.    Objective:     Vital Signs (Most Recent):  Temp: 98.9 °F (37.2 °C) (06/11/23 0734)  Pulse: 66 (06/11/23 0734)  Resp: 18 (06/11/23 0734)  BP: (!) 189/84 (06/11/23 0734)  SpO2: 96 % (06/11/23 0734) Vital Signs (24h Range):  Temp:  [97.4 °F (36.3 °C)-98.9 °F (37.2 °C)] 98.9 °F (37.2 °C)  Pulse:  [58-69] 66  Resp:  [17-20] 18  SpO2:  [94 %-99 %] 96 %  BP: ()/(51-84) 189/84     Weight: 62.8 kg (138 lb 7.2 oz)  Body mass index is 23.76 kg/m².    Intake/Output Summary (Last 24 hours) at 6/11/2023 1024  Last data filed at 6/10/2023 1706  Gross per 24 hour   Intake 300 ml   Output 150 ml   Net 150 ml           Physical Exam  Constitutional:       Appearance: She is well-developed.   HENT:      Head: Normocephalic and atraumatic.   Eyes:       Conjunctiva/sclera: Conjunctivae normal.      Pupils: Pupils are equal, round, and reactive to light.   Neck:      Thyroid: No thyromegaly.      Vascular: No JVD.   Cardiovascular:      Rate and Rhythm: Normal rate and regular rhythm.      Heart sounds: Normal heart sounds. No murmur heard.    No friction rub. No gallop.   Pulmonary:      Effort: Pulmonary effort is normal.      Breath sounds: Normal breath sounds. No wheezing or rales.   Abdominal:      General: Bowel sounds are normal. There is no distension.      Palpations: Abdomen is soft.      Tenderness: There is no abdominal tenderness. There is no guarding or rebound.   Musculoskeletal:         General: No tenderness. Normal range of motion.      Cervical back: Neck supple.   Skin:     General: Skin is warm and dry.   Neurological:      Mental Status: She is alert and oriented to person, place, and time.      Cranial Nerves: No cranial nerve deficit.   Psychiatric:         Behavior: Behavior normal.           Significant Labs: All pertinent labs within the past 24 hours have been reviewed.  CMP:   Recent Labs   Lab 06/09/23  1548 06/09/23  2004 06/10/23  0017 06/10/23  0336 06/11/23  0405   *   < > 147* 144 137   K 4.7   < > 3.9 4.0 3.7   CL 87*   < > 100 102 97   CO2 34*   < > 31* 31* 26   *   < > 155* 214* 198*   BUN 23   < > 21 20 20   CREATININE 1.7*   < > 1.4 1.4 1.3   CALCIUM 10.3   < > 11.0* 10.4 9.5   PROT 8.6*  --   --   --  6.8   ALBUMIN 3.1*  --   --   --  2.5*   BILITOT 1.0  --   --   --  2.2*   ALKPHOS 301*  --   --   --  338*   AST 34  --   --   --  91*   ALT 32  --   --   --  51*   ANIONGAP 12   < > 16 11 14    < > = values in this interval not displayed.         Significant Imaging: I have reviewed all pertinent imaging results/findings within the past 24 hours.

## 2023-06-11 NOTE — ASSESSMENT & PLAN NOTE
"Lipase >2000 on presentation, downtrending without any identifiable cause for elevation. Patient without any abdominal pain.  - US abdomen with gallbladder sludge and "prominence" of the pancreatic duct   - liver enzymes, alk-phos, bilirubin elevated on 06/11, suggestive of possible choledocholithiasis as cause of pancreatitis.  MRCP ordered  - discontinue HCTZ as it could potentially be cause of pancreatitis as well    "

## 2023-06-12 PROBLEM — E80.6 HYPERBILIRUBINEMIA: Status: ACTIVE | Noted: 2023-06-12

## 2023-06-12 LAB
ALBUMIN SERPL BCP-MCNC: 2.6 G/DL (ref 3.5–5.2)
ALP SERPL-CCNC: 577 U/L (ref 55–135)
ALP SERPL-CCNC: 736 U/L (ref 55–135)
ALP SERPL-CCNC: ABNORMAL U/L
ALT SERPL W/O P-5'-P-CCNC: 100 U/L (ref 10–44)
ALT SERPL W/O P-5'-P-CCNC: 191 U/L (ref 10–44)
ALT SERPL W/O P-5'-P-CCNC: 83 U/L (ref 10–44)
ANION GAP SERPL CALC-SCNC: 12 MMOL/L (ref 8–16)
ANION GAP SERPL CALC-SCNC: 12 MMOL/L (ref 8–16)
ANION GAP SERPL CALC-SCNC: 17 MMOL/L (ref 8–16)
AST SERPL-CCNC: 195 U/L (ref 10–40)
AST SERPL-CCNC: 437 U/L (ref 10–40)
AST SERPL-CCNC: ABNORMAL U/L
BACTERIA #/AREA URNS AUTO: ABNORMAL /HPF
BASOPHILS # BLD AUTO: 0.07 K/UL (ref 0–0.2)
BASOPHILS NFR BLD: 0.7 % (ref 0–1.9)
BILIRUB DIRECT SERPL-MCNC: 3.2 MG/DL (ref 0.1–0.3)
BILIRUB SERPL-MCNC: 4.4 MG/DL (ref 0.1–1)
BILIRUB SERPL-MCNC: 5.2 MG/DL (ref 0.1–1)
BILIRUB SERPL-MCNC: ABNORMAL MG/DL
BILIRUB UR QL STRIP: ABNORMAL
BUN SERPL-MCNC: 28 MG/DL (ref 8–23)
BUN SERPL-MCNC: 28 MG/DL (ref 8–23)
BUN SERPL-MCNC: ABNORMAL MG/DL
CALCIUM SERPL-MCNC: 9.2 MG/DL (ref 8.7–10.5)
CALCIUM SERPL-MCNC: 9.4 MG/DL (ref 8.7–10.5)
CALCIUM SERPL-MCNC: 9.4 MG/DL (ref 8.7–10.5)
CHLORIDE SERPL-SCNC: 97 MMOL/L (ref 95–110)
CHLORIDE SERPL-SCNC: 98 MMOL/L (ref 95–110)
CHLORIDE SERPL-SCNC: 98 MMOL/L (ref 95–110)
CLARITY UR REFRACT.AUTO: ABNORMAL
CO2 SERPL-SCNC: 18 MMOL/L (ref 23–29)
CO2 SERPL-SCNC: 26 MMOL/L (ref 23–29)
CO2 SERPL-SCNC: 26 MMOL/L (ref 23–29)
COLOR UR AUTO: YELLOW
CREAT SERPL-MCNC: 1.3 MG/DL (ref 0.5–1.4)
CREAT SERPL-MCNC: 1.6 MG/DL (ref 0.5–1.4)
CREAT SERPL-MCNC: ABNORMAL MG/DL
CREAT UR-MCNC: 156 MG/DL (ref 15–325)
DIFFERENTIAL METHOD: ABNORMAL
EOSINOPHIL # BLD AUTO: 0.4 K/UL (ref 0–0.5)
EOSINOPHIL NFR BLD: 4.3 % (ref 0–8)
ERYTHROCYTE [DISTWIDTH] IN BLOOD BY AUTOMATED COUNT: 12.9 % (ref 11.5–14.5)
EST. GFR  (NO RACE VARIABLE): 36.2 ML/MIN/1.73 M^2
EST. GFR  (NO RACE VARIABLE): 46.5 ML/MIN/1.73 M^2
GLUCOSE SERPL-MCNC: 245 MG/DL (ref 70–110)
GLUCOSE SERPL-MCNC: 272 MG/DL (ref 70–110)
GLUCOSE SERPL-MCNC: 295 MG/DL (ref 70–110)
GLUCOSE UR QL STRIP: ABNORMAL
HCT VFR BLD AUTO: 35.5 % (ref 37–48.5)
HGB BLD-MCNC: 11.6 G/DL (ref 12–16)
HGB UR QL STRIP: ABNORMAL
HYALINE CASTS UR QL AUTO: 4 /LPF
IMM GRANULOCYTES # BLD AUTO: 0.04 K/UL (ref 0–0.04)
IMM GRANULOCYTES NFR BLD AUTO: 0.4 % (ref 0–0.5)
KETONES UR QL STRIP: ABNORMAL
LEUKOCYTE ESTERASE UR QL STRIP: ABNORMAL
LIPASE SERPL-CCNC: 1666 U/L (ref 4–60)
LYMPHOCYTES # BLD AUTO: 1.7 K/UL (ref 1–4.8)
LYMPHOCYTES NFR BLD: 18 % (ref 18–48)
MAGNESIUM SERPL-MCNC: 2.4 MG/DL (ref 1.6–2.6)
MAGNESIUM SERPL-MCNC: NORMAL MG/DL (ref 1.6–2.6)
MCH RBC QN AUTO: 26.8 PG (ref 27–31)
MCHC RBC AUTO-ENTMCNC: 32.7 G/DL (ref 32–36)
MCV RBC AUTO: 82 FL (ref 82–98)
MICROSCOPIC COMMENT: ABNORMAL
MONOCYTES # BLD AUTO: 0.9 K/UL (ref 0.3–1)
MONOCYTES NFR BLD: 9.3 % (ref 4–15)
NEUTROPHILS # BLD AUTO: 6.4 K/UL (ref 1.8–7.7)
NEUTROPHILS NFR BLD: 67.3 % (ref 38–73)
NITRITE UR QL STRIP: NEGATIVE
NRBC BLD-RTO: 0 /100 WBC
PH UR STRIP: 6 [PH] (ref 5–8)
PHOSPHATE SERPL-MCNC: 3.2 MG/DL (ref 2.7–4.5)
PHOSPHATE SERPL-MCNC: 3.3 MG/DL (ref 2.7–4.5)
PLATELET # BLD AUTO: 278 K/UL (ref 150–450)
PMV BLD AUTO: 12.3 FL (ref 9.2–12.9)
POCT GLUCOSE: 156 MG/DL (ref 70–110)
POCT GLUCOSE: 212 MG/DL (ref 70–110)
POCT GLUCOSE: 293 MG/DL (ref 70–110)
POCT GLUCOSE: 303 MG/DL (ref 70–110)
POTASSIUM SERPL-SCNC: 4 MMOL/L (ref 3.5–5.1)
POTASSIUM SERPL-SCNC: 4.2 MMOL/L (ref 3.5–5.1)
POTASSIUM SERPL-SCNC: 5.9 MMOL/L (ref 3.5–5.1)
PROT SERPL-MCNC: 6.8 G/DL (ref 6–8.4)
PROT SERPL-MCNC: 7 G/DL (ref 6–8.4)
PROT SERPL-MCNC: 7.2 G/DL (ref 6–8.4)
PROT UR QL STRIP: ABNORMAL
RBC # BLD AUTO: 4.33 M/UL (ref 4–5.4)
RBC #/AREA URNS AUTO: 1 /HPF (ref 0–4)
SODIUM SERPL-SCNC: 133 MMOL/L (ref 136–145)
SODIUM SERPL-SCNC: 135 MMOL/L (ref 136–145)
SODIUM SERPL-SCNC: 136 MMOL/L (ref 136–145)
SODIUM UR-SCNC: 45 MMOL/L (ref 20–250)
SP GR UR STRIP: 1.02 (ref 1–1.03)
SQUAMOUS #/AREA URNS AUTO: 11 /HPF
URN SPEC COLLECT METH UR: ABNORMAL
WBC # BLD AUTO: 9.49 K/UL (ref 3.9–12.7)
WBC #/AREA URNS AUTO: 7 /HPF (ref 0–5)
YEAST UR QL AUTO: ABNORMAL

## 2023-06-12 PROCEDURE — 84300 ASSAY OF URINE SODIUM: CPT | Performed by: STUDENT IN AN ORGANIZED HEALTH CARE EDUCATION/TRAINING PROGRAM

## 2023-06-12 PROCEDURE — 99222 PR INITIAL HOSPITAL CARE,LEVL II: ICD-10-PCS | Mod: ,,, | Performed by: INTERNAL MEDICINE

## 2023-06-12 PROCEDURE — 99232 SBSQ HOSP IP/OBS MODERATE 35: CPT | Mod: ,,, | Performed by: GENERAL ACUTE CARE HOSPITAL

## 2023-06-12 PROCEDURE — 82570 ASSAY OF URINE CREATININE: CPT | Performed by: STUDENT IN AN ORGANIZED HEALTH CARE EDUCATION/TRAINING PROGRAM

## 2023-06-12 PROCEDURE — 83690 ASSAY OF LIPASE: CPT | Performed by: STUDENT IN AN ORGANIZED HEALTH CARE EDUCATION/TRAINING PROGRAM

## 2023-06-12 PROCEDURE — 93005 ELECTROCARDIOGRAM TRACING: CPT

## 2023-06-12 PROCEDURE — 25000003 PHARM REV CODE 250: Performed by: FAMILY MEDICINE

## 2023-06-12 PROCEDURE — 93010 EKG 12-LEAD: ICD-10-PCS | Mod: ,,, | Performed by: INTERNAL MEDICINE

## 2023-06-12 PROCEDURE — 81001 URINALYSIS AUTO W/SCOPE: CPT | Performed by: STUDENT IN AN ORGANIZED HEALTH CARE EDUCATION/TRAINING PROGRAM

## 2023-06-12 PROCEDURE — 36415 COLL VENOUS BLD VENIPUNCTURE: CPT | Performed by: STUDENT IN AN ORGANIZED HEALTH CARE EDUCATION/TRAINING PROGRAM

## 2023-06-12 PROCEDURE — 20600001 HC STEP DOWN PRIVATE ROOM

## 2023-06-12 PROCEDURE — 82248 BILIRUBIN DIRECT: CPT | Performed by: STUDENT IN AN ORGANIZED HEALTH CARE EDUCATION/TRAINING PROGRAM

## 2023-06-12 PROCEDURE — 83735 ASSAY OF MAGNESIUM: CPT | Performed by: FAMILY MEDICINE

## 2023-06-12 PROCEDURE — 25000003 PHARM REV CODE 250: Performed by: STUDENT IN AN ORGANIZED HEALTH CARE EDUCATION/TRAINING PROGRAM

## 2023-06-12 PROCEDURE — 84100 ASSAY OF PHOSPHORUS: CPT | Mod: 91 | Performed by: STUDENT IN AN ORGANIZED HEALTH CARE EDUCATION/TRAINING PROGRAM

## 2023-06-12 PROCEDURE — 63600175 PHARM REV CODE 636 W HCPCS: Performed by: STUDENT IN AN ORGANIZED HEALTH CARE EDUCATION/TRAINING PROGRAM

## 2023-06-12 PROCEDURE — 80053 COMPREHEN METABOLIC PANEL: CPT | Performed by: STUDENT IN AN ORGANIZED HEALTH CARE EDUCATION/TRAINING PROGRAM

## 2023-06-12 PROCEDURE — 63600175 PHARM REV CODE 636 W HCPCS: Performed by: FAMILY MEDICINE

## 2023-06-12 PROCEDURE — 99233 PR SUBSEQUENT HOSPITAL CARE,LEVL III: ICD-10-PCS | Mod: ,,, | Performed by: STUDENT IN AN ORGANIZED HEALTH CARE EDUCATION/TRAINING PROGRAM

## 2023-06-12 PROCEDURE — 99232 PR SUBSEQUENT HOSPITAL CARE,LEVL II: ICD-10-PCS | Mod: ,,, | Performed by: GENERAL ACUTE CARE HOSPITAL

## 2023-06-12 PROCEDURE — 93010 ELECTROCARDIOGRAM REPORT: CPT | Mod: ,,, | Performed by: INTERNAL MEDICINE

## 2023-06-12 PROCEDURE — 84100 ASSAY OF PHOSPHORUS: CPT | Performed by: FAMILY MEDICINE

## 2023-06-12 PROCEDURE — 99222 1ST HOSP IP/OBS MODERATE 55: CPT | Mod: ,,, | Performed by: INTERNAL MEDICINE

## 2023-06-12 PROCEDURE — 83735 ASSAY OF MAGNESIUM: CPT | Mod: 91 | Performed by: STUDENT IN AN ORGANIZED HEALTH CARE EDUCATION/TRAINING PROGRAM

## 2023-06-12 PROCEDURE — 85025 COMPLETE CBC W/AUTO DIFF WBC: CPT | Performed by: FAMILY MEDICINE

## 2023-06-12 PROCEDURE — 36415 COLL VENOUS BLD VENIPUNCTURE: CPT | Performed by: FAMILY MEDICINE

## 2023-06-12 PROCEDURE — 94761 N-INVAS EAR/PLS OXIMETRY MLT: CPT

## 2023-06-12 PROCEDURE — 99233 SBSQ HOSP IP/OBS HIGH 50: CPT | Mod: ,,, | Performed by: STUDENT IN AN ORGANIZED HEALTH CARE EDUCATION/TRAINING PROGRAM

## 2023-06-12 RX ORDER — TRAZODONE HYDROCHLORIDE 50 MG/1
50 TABLET ORAL NIGHTLY
Status: DISCONTINUED | OUTPATIENT
Start: 2023-06-12 | End: 2023-06-14

## 2023-06-12 RX ORDER — INSULIN ASPART 100 [IU]/ML
10 INJECTION, SOLUTION INTRAVENOUS; SUBCUTANEOUS
Status: DISCONTINUED | OUTPATIENT
Start: 2023-06-12 | End: 2023-06-14

## 2023-06-12 RX ORDER — SODIUM CHLORIDE, SODIUM LACTATE, POTASSIUM CHLORIDE, CALCIUM CHLORIDE 600; 310; 30; 20 MG/100ML; MG/100ML; MG/100ML; MG/100ML
INJECTION, SOLUTION INTRAVENOUS CONTINUOUS
Status: ACTIVE | OUTPATIENT
Start: 2023-06-12 | End: 2023-06-13

## 2023-06-12 RX ADMIN — GABAPENTIN 300 MG: 300 CAPSULE ORAL at 08:06

## 2023-06-12 RX ADMIN — ISOSORBIDE MONONITRATE 20 MG: 20 TABLET ORAL at 08:06

## 2023-06-12 RX ADMIN — METOPROLOL TARTRATE 50 MG: 50 TABLET, FILM COATED ORAL at 10:06

## 2023-06-12 RX ADMIN — TRAZODONE HYDROCHLORIDE 50 MG: 50 TABLET ORAL at 10:06

## 2023-06-12 RX ADMIN — INSULIN ASPART 7 UNITS: 100 INJECTION, SOLUTION INTRAVENOUS; SUBCUTANEOUS at 12:06

## 2023-06-12 RX ADMIN — APIXABAN 2.5 MG: 2.5 TABLET, FILM COATED ORAL at 08:06

## 2023-06-12 RX ADMIN — ONDANSETRON 4 MG: 2 INJECTION INTRAMUSCULAR; INTRAVENOUS at 08:06

## 2023-06-12 RX ADMIN — CILOSTAZOL 100 MG: 100 TABLET ORAL at 08:06

## 2023-06-12 RX ADMIN — CILOSTAZOL 100 MG: 100 TABLET ORAL at 10:06

## 2023-06-12 RX ADMIN — INSULIN ASPART 3 UNITS: 100 INJECTION, SOLUTION INTRAVENOUS; SUBCUTANEOUS at 08:06

## 2023-06-12 RX ADMIN — SODIUM CHLORIDE, POTASSIUM CHLORIDE, SODIUM LACTATE AND CALCIUM CHLORIDE 1000 ML: 600; 310; 30; 20 INJECTION, SOLUTION INTRAVENOUS at 10:06

## 2023-06-12 RX ADMIN — AMLODIPINE BESYLATE 10 MG: 10 TABLET ORAL at 08:06

## 2023-06-12 RX ADMIN — ASPIRIN 81 MG 81 MG: 81 TABLET ORAL at 08:06

## 2023-06-12 RX ADMIN — OXYCODONE HYDROCHLORIDE 10 MG: 10 TABLET ORAL at 06:06

## 2023-06-12 RX ADMIN — ESCITALOPRAM OXALATE 10 MG: 10 TABLET ORAL at 08:06

## 2023-06-12 RX ADMIN — SODIUM CHLORIDE, POTASSIUM CHLORIDE, SODIUM LACTATE AND CALCIUM CHLORIDE: 600; 310; 30; 20 INJECTION, SOLUTION INTRAVENOUS at 05:06

## 2023-06-12 RX ADMIN — SODIUM CHLORIDE, POTASSIUM CHLORIDE, SODIUM LACTATE AND CALCIUM CHLORIDE: 600; 310; 30; 20 INJECTION, SOLUTION INTRAVENOUS at 11:06

## 2023-06-12 RX ADMIN — METOPROLOL TARTRATE 50 MG: 50 TABLET, FILM COATED ORAL at 08:06

## 2023-06-12 RX ADMIN — GABAPENTIN 600 MG: 300 CAPSULE ORAL at 10:06

## 2023-06-12 RX ADMIN — INSULIN ASPART 4 UNITS: 100 INJECTION, SOLUTION INTRAVENOUS; SUBCUTANEOUS at 12:06

## 2023-06-12 RX ADMIN — INSULIN ASPART 10 UNITS: 100 INJECTION, SOLUTION INTRAVENOUS; SUBCUTANEOUS at 05:06

## 2023-06-12 RX ADMIN — FAMOTIDINE 20 MG: 20 TABLET, FILM COATED ORAL at 08:06

## 2023-06-12 RX ADMIN — INSULIN ASPART 7 UNITS: 100 INJECTION, SOLUTION INTRAVENOUS; SUBCUTANEOUS at 08:06

## 2023-06-12 RX ADMIN — Medication 6 MG: at 10:06

## 2023-06-12 NOTE — ASSESSMENT & PLAN NOTE
- continue metoprolol  - holding home losartan for PAPA  - holding amlodipine given normotension and concern normotension may be causing PAPA  - discontinued HCTZ altogether given concerns of recurrent pancreatitis  - hydralazine prn

## 2023-06-12 NOTE — PROGRESS NOTES
"[facility] - Telemetry Stepdown  Endocrinology  Progress Note    Admit Date: 2023     63 year old  female with type 2 diabetes mellitus, CKD, CAD, hypertension, hyperlipidemia, MURTAZA (not on CPAP), PAD, Afib and hx of DVT on Eliquis and recent admission for pancreatitis who presents with HHS.     - Patient recently admitted  to  for acute pancreatitis attributed to Trulicity, which was discontinued upon d/c. Per pt's PCP this was 2nd episode of pancreatitis while on trulicity -- prior episode when increasing dose of Trulicity.   Pt states upon d/c  she was feeling slight improvement but still having intermittent n/v and not tolerating normal diet. N/V began to get worse in last few days which prompted admission. No abdominal pain on presentation for current admission, unlike last admit when she had severe abd pain for pancreatitis.    In the ED patient afebrile and hemodynamically stable saturating well on room air. Lipase >2000. BG >700 and serum osmo 326 after 1L IVFs. B-hydroxybutyrate 1.3. pH 7.4 and bicarb 34. AG 12. Patient started on aggressive IVFs and insulin gtt for HHS.     - Endocrinology consulted for initial HHS and glucose management      Regarding Type 2 Diabetes Mellitus:    - Initially diagnosed with Type 2 diabetes mellitus: pt not sure - states "long time ago"; at least as far back as  per review of elevated a1c in epic  - Home diabetic medications include: Farxiga 10mg qd, Glipizide ER 10mg daily, Levemir 20u qhs (but never started). She has been on MDI previously, most recently she was on Toujeo and Humalog discontinued 2021 but she states she was on insulin up until about 10mo ago.  - Family History: Not asked  - Weight based dosin kg x 0.4 (CKD) = 25 TDD x 0.5 = 12 basal / 12 prandial  - 1700/TDD = 68 (estimated insulin sensitivity factor)  - 450/TDD = 18 (estimated starting carb ratio for prandial dosing)    Lab Results   Component Value Date    " "HGBA1C 8.1 (H) 2023    HGBA1C 8.2 (H) 2023    HGBA1C 8.8 (H) 2023    CPEPTIDE 1.36 2017     Lab Results   Component Value Date    EGFRNORACEVR 46.5 (A) 2023    EGFRNORACEVR 42.5 (A) 06/10/2023    EGFRNORACEVR 42.5 (A) 06/10/2023       Interval HPI:   Overnight events:  No acute events reported overnight  Eatin%  Nausea: No  Hypoglycemia and intervention: No  Fever: No  TPN and/or TF: No  If yes, type of TF/TPN and rate: NA    BP (!) 103/51 (BP Location: Left arm, Patient Position: Lying)   Pulse 79   Temp 97.8 °F (36.6 °C) (Oral)   Resp 18   Ht 5' 4" (1.626 m)   Wt 62.8 kg (138 lb 7.2 oz)   LMP  (LMP Unknown)   SpO2 (!) 91%   BMI 23.76 kg/m²     Labs Reviewed and Include    Recent Labs   Lab 23  0338   *   CALCIUM 9.4   ALBUMIN 2.6*   PROT 7.2   *   K 5.9*   CO2 18*   CL 98   BUN SEE COMMENT   CREATININE SEE COMMENT   ALKPHOS SEE COMMENT   ALT 83*   AST SEE COMMENT   BILITOT SEE COMMENT     Lab Results   Component Value Date    WBC 9.49 2023    HGB 11.6 (L) 2023    HCT 35.5 (L) 2023    MCV 82 2023     2023     No results for input(s): TSH, FREET4 in the last 168 hours.  Lab Results   Component Value Date    HGBA1C 8.1 (H) 2023       Nutritional status:   Body mass index is 23.76 kg/m².  Lab Results   Component Value Date    ALBUMIN 2.6 (L) 2023    ALBUMIN 2.5 (L) 2023    ALBUMIN 3.1 (L) 2023     No results found for: PREALBUMIN    Estimated Creatinine Clearance: 38.7 mL/min (based on SCr of 1.3 mg/dL).    Accu-Checks  Recent Labs     06/10/23  1459 06/10/23  1630 06/10/23  1731 06/10/23  2048 06/10/23  2345 23  0737 23  1156 23  1646 23  2134 23  0724   POCTGLUCOSE 139* 149* 144* 208* 207* 262* 359* 342* 253* 293*       Current Medications and/or Treatments Impacting Glycemic Control  Immunotherapy:    Immunosuppressants       None          Steroids:   Hormones " (From admission, onward)      Start     Stop Route Frequency Ordered    06/09/23 2028  melatonin tablet 6 mg         -- Oral Nightly PRN 06/09/23 1931          Pressors:    Autonomic Drugs (From admission, onward)      None          Hyperglycemia/Diabetes Medications:   Antihyperglycemics (From admission, onward)      Start     Stop Route Frequency Ordered    06/12/23 0715  insulin aspart U-100 pen 7 Units         -- SubQ 3 times daily with meals 06/11/23 1823    06/11/23 2100  insulin detemir U-100 (Levemir) pen 16 Units         -- SubQ Nightly 06/11/23 1028    06/10/23 1315  insulin regular in 0.9 % NaCl 100 unit/100 mL (1 unit/mL) infusion        Question:  Enter initial dose (Units/hr):  Answer:  0.6    06/10 2200 IV Continuous 06/10/23 1211    06/10/23 1309  insulin aspart U-100 pen 0-5 Units         -- SubQ As needed (PRN) 06/10/23 1211            ASSESSMENT and PLAN    Cardiac/Vascular  Hyperlipidemia associated with type 2 diabetes mellitus  - On Lipitor 40mg and Repatha outpatient  - Management per primary    Renal/  PAPA (acute kidney injury)  - Improved/resolved. Likely 2/2 IVVD 2/2 HHS on admission; baseline Cr appears to be around 1.1-1.2  - Management per primary    Endocrine  * Type 2 diabetes mellitus with hyperosmolar hyperglycemic state (HHS)  - HHS on admission. Recent pancreatitis admission, not yet back to normal po intake. Taking Glipizide and Farxiga at home. Never picked up Levemir Rx recently prescribed by her PC  - 24 hour glucose trend:  Uncontrolled in the mid 200s and low 300s    - Increase Levemir from 16 up to 20 units every night starting this evening  - Increase aspart from 7 up to 10 units TIDAC+LDC starting with dinner    GI  History of pancreatitis  - She has now had two episodes of pancreatitis on Trulicity, however suspicion for choledocholithiasis as cause for pancreatitis  - Hold Trulicity at discharge      Mario Alberto Carroll DO  [facility], 6th  Floor  [address] [place], [place], [address]    Office: [phone]  Fax: [phone]    Disclaimer: This note has been generated using voice-recognition software. There may be typographical errors that have been missed during proof-reading.    The above history labs imaging impression and plan were discussed with attending physician who is in agreement and also took part in this patient's care.  I personally reviewed all of the patients available medications, labs, imaging, vitals, allergies, medical history.

## 2023-06-12 NOTE — CONSULTS
[facility] - Telemetry Stepdown  [facility]  Consult Note    Patient Name: Mariann Huff  MRN: [id number]  Admission Date: 6/9/2023  Hospital Length of Stay: 3 days  Code Status: Full Code   Attending Provider: Gregory Landers MD   Consulting Provider: Vijay Bonilla MD  Primary Care Physician: Jasbir Haney MD  Principal Problem:Type 2 diabetes mellitus with hyperosmolar hyperglycemic state (HHS)    Inpatient consult to [facility] ([facility])  Consult performed by: Vijay Bonilla MD  Consult ordered by: Gregory Landers MD        Subjective:     HPI:  Ms. Mariann Huff is a 63 year old female for whom AES is consulted with concern for biliary obstruction. She has a PMH significant for CAD (status post PCI in 2014), DVT (on Apixaban), MURTAZA, pancreatitis (05/2023; suspected secondary to TRULICITY), peripheral artery disease, and T2DM.     Hospital Course:   Patient was admitted to [facility] on 06/09 for management of T2DM with HHS requiring initiation of IVF and insulin drip. On 06/11, patient noted to develop new hyperbilirubinemia (2.2) and transaminitis (AST 91, ALT 51). MRI/MRCP was without biliary dilatation or cholelithiasis, but did show possible inflammatory changes around pancreatic head. AES consulted for further evaluation.     On initial bedside interview, patient denied abdominal pain, nausea, vomiting, prior episodes of pancreatitis before 05/2023, family history of gastric, colon, or pancreatic cancer, skin/eye yellowing, and prior abdominal surgeries.       Past Medical History:   Diagnosis Date    Anticoagulant long-term use     Asthma     Back pain     Bradycardia, unspecified 5/8/2019    The etiology of the bradycardic episode is unclear.  The have appear to be respiratory in origin (at least the 1st episode).  We will continue to monitor carefully.  We are awaiting evaluation by Cardiology.      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, with long-term current use of insulin 2020       Past Surgical History:   Procedure Laterality Date    ANTEGRADE NEPHROSTOGRAPHY Left 2019    Procedure: Nephrostogram - antegrade;  Surgeon: Robin Boyd MD;  [facility];  Service: Urology;  Laterality: Left;    BRONCHOSCOPY N/A 2019    Procedure: BRONCHOSCOPY;  Surgeon: Sean Ruano MD;  Location: [facility] OR [address] [place] [address];  Service: General;  Laterality: N/A;    CARDIAC CATHETERIZATION      CATARACT EXTRACTION      cataract extraction left eye      cataracts      CAUDAL EPIDURAL STEROID INJECTION N/A 2019    Procedure: Injection-steroid-epidural-caudal;  Surgeon: Dave Bentley Jr., MD;  Location: [facility] PAIN MGT;  Service: Pain Management;  Laterality: N/A;     SECTION, LOW TRANSVERSE      COLONOSCOPY N/A 2019    Procedure: COLONOSCOPY;  Surgeon: Al Alaniz MD;  [facility]O ([facility]);  Service: Endoscopy;  Laterality: N/A;    CORONARY ANGIOPLASTY      CYSTOGRAM N/A 2019    Procedure: CYSTOGRAM INTRAOP;  Surgeon: Robin Boyd MD;  [facility];  Service: Urology;  Laterality: N/A;  1 HOUR    CYSTOSCOPY W/ RETROGRADES Left 2019    Procedure: CYSTOSCOPY, WITH RETROGRADE PYELOGRAM;  Surgeon: Robin Boyd MD;  [facility];  Service: Urology;  Laterality: Left;  fluro    ESOPHAGOGASTRODUODENOSCOPY W/ PEG  2019    Procedure: EGD, WITH  PEG TUBE INSERTION;  Surgeon: Sean Ruano MD;  Location: [facility] OR 2ND FLR;  Service: General;;    EXCISION TURBINATE, SUBMUCOUS      FLEXIBLE SIGMOIDOSCOPY N/A 5/13/2019    Procedure: SIGMOIDOSCOPY, FLEXIBLE;  Surgeon: ALBERTO Amin MD;  Location: [facility] ENDO (2ND FLR);  Service: Endoscopy;  Laterality: N/A;    FLEXIBLE SIGMOIDOSCOPY N/A 5/21/2019    Procedure: SIGMOIDOSCOPY, FLEXIBLE;  Surgeon: ALBERTO Amin MD;  Location: [facility] ENDO (2ND FLR);  Service: Endoscopy;  Laterality: N/A;    FUSION OF LUMBAR SPINE BY ANTERIOR APPROACH Left 4/12/2019    Procedure: FUSION, SPINE, LUMBAR, ANTERIOR APPROACH Left L5-S1 Anterior to Psoa Interbody Fusion, L5-S1 Posterior Instrumentation;  Surgeon: Mk George MD;  Location: [facility] 2ND FLR;  Service: Neurosurgery;  Laterality: Left;  Porcedure:  Left L5-S1 Anterior to Psoa Interbody Fusion, L5-S1 Posterior Instrumentation  Surgery Time: 4 Hrs  LOS: 2-3  Anesthesia: General   Blood: Type & Screen  R    HAND SURGERY Left     HAND SURGERY Right     torn ligament repair/ Dr. Yeboah    HYSTERECTOMY      INJECTION OF STEROID Right 12/10/2020    Procedure: INJECTION, STEROID Right SI Joint Block and Steroid Injection;  Surgeon: Mk George MD;  Location: [facility] OR;  Service: Neurosurgery;  Laterality: Right;  Procedure: Right SI Joint Block and Steroid Injection   SUrgery Time: 30 Min  LOS: 0  Anesthesia: MAC  Radiology: C-arm  Bed: [place] [id number] Poster  Position: Prone    INJECTION OF STEROID Right 9/28/2021    Procedure: INJECTION, STEROID Right SI joint block & steroid injection;  Surgeon: Mk George MD;  Location: [facility] OR;  Service: Neurosurgery;  Laterality: Right;  Procedure: Right SI joint block & steroid injection  Surgery Time: 30m  Anesthesia: Local MAC  Radiology: C-arm  Bed: Regular  Position: Prone    left foot surgery      left wrist surgery      LYSIS OF ADHESIONS N/A 2/19/2020    Procedure: LYSIS, ADHESIONS;  Surgeon: Robin Boyd MD;  [facility];  Service: Urology;  Laterality: N/A;    NASAL SEPTUM SURGERY  5/7/15    PERCUTANEOUS NEPHROSTOMY Left 4/21/2019    Procedure: Creation, Nephrostomy, Percutaneous;  Surgeon: Karina Surgeon;  Location: [facility];  Service: Anesthesiology;  Laterality: Left;    REPAIR OF URETER  4/12/2019    Procedure: REPAIR, URETER;  Surgeon: Mk George MD;  Location: [address];  Service: Neurosurgery;;    REPLACEMENT OF NEPHROSTOMY TUBE N/A 7/18/2019    Procedure: REPLACEMENT, NEPHROSTOMY TUBE;  Surgeon: [facility];  [address];  Service: Anesthesiology;  Laterality: N/A;  188    REPLACEMENT OF NEPHROSTOMY TUBE N/A 7/24/2019    Procedure: REPLACEMENT, NEPHROSTOMY TUBE;  Surgeon: [facility];  [address];  Service: Anesthesiology;  Laterality: N/A;  188    REPLACEMENT OF NEPHROSTOMY TUBE N/A 10/7/2019    Procedure: REPLACEMENT, NEPHROSTOMY TUBE;  Surgeon: [facility];  [address];  Service: Anesthesiology;  Laterality: N/A;  189    REPLACEMENT OF NEPHROSTOMY TUBE N/A 11/25/2019    Procedure: REPLACEMENT, NEPHROSTOMY TUBE;  Surgeon: [facility];  [address];  Service: Anesthesiology;  Laterality: N/A;  Room 188/Bessy    REPLACEMENT OF NEPHROSTOMY TUBE Right 2/19/2020    Procedure: REPLACEMENT, NEPHROSTOMY TUBE removal removal;  Surgeon: Robin Boyd MD;  Location: [address];  Service: Urology;  Laterality: Right;    rt elbow surgery      S/P LAD COATED STENT  05/14/2010    6 total     S/P OM1 STENT  08/2003    SINUS SURGERY      F.E.S.S.    TRACHEOSTOMY N/A 5/2/2019    Procedure: CREATION, TRACHEOSTOMY;  Surgeon: Sean Ruano MD;  Location: [address];  Service: General;  Laterality: N/A;    TUBAL LIGATION      URETERAL REIMPLANTION Left 2/19/2020    Procedure: REIMPLANTATION, URETER WITH PSOAS HITCH;  Surgeon: Robin Boyd MD;  Location: [address];  Service: Urology;   Laterality: Left;       Review of patient's allergies indicates:   Allergen Reactions    Milk containing products (dairy)      Causes GI distress     Family History       Problem Relation (Age of Onset)    Diabetes Mother, Father, Sister, Brother, Sister    Heart attack Father    Heart disease Mother    Leukemia Father    No Known Problems Sister, Brother, Brother, Maternal Grandmother, Maternal Grandfather, Paternal Grandmother, Paternal Grandfather, Son, Son, Maternal Aunt, Maternal Uncle, Paternal Aunt, Paternal Uncle          Tobacco Use    Smoking status: Never    Smokeless tobacco: Never   Substance and Sexual Activity    Alcohol use: No     Alcohol/week: 0.0 standard drinks    Drug use: No    Sexual activity: Yes     Partners: Male     Birth control/protection: Post-menopausal     Comment:      Review of Systems   Constitutional:  Positive for fatigue. Negative for appetite change, chills and fever.   HENT:  Negative for congestion and trouble swallowing.    Eyes:  Negative for pain and redness.   Respiratory:  Negative for cough and shortness of breath.    Cardiovascular:  Negative for chest pain and palpitations.   Gastrointestinal:  Negative for abdominal pain, constipation, diarrhea, nausea and vomiting.   Genitourinary:  Negative for difficulty urinating and dysuria.   Musculoskeletal:  Negative for back pain and neck pain.   Skin:  Negative for rash.   Neurological:  Negative for dizziness, light-headedness and headaches.   Objective:     Vital Signs (Most Recent):  Temp: 97.7 °F (36.5 °C) (06/12/23 1201)  Pulse: 71 (06/12/23 1201)  Resp: 18 (06/12/23 1201)  BP: (!) 117/58 (06/12/23 1201)  SpO2: 95 % (06/12/23 1201) Vital Signs (24h Range):  Temp:  [96.6 °F (35.9 °C)-98.6 °F (37 °C)] 97.7 °F (36.5 °C)  Pulse:  [71-79] 71  Resp:  [18] 18  SpO2:  [91 %-98 %] 95 %  BP: (103-154)/(51-74) 117/58     Weight: 62.8 kg (138 lb 7.2 oz) (06/09/23 2311)  Body mass index is 23.76 kg/m².      Intake/Output  Summary (Last 24 hours) at 6/12/2023 1427  Last data filed at 6/12/2023 1329  Gross per 24 hour   Intake 860 ml   Output 3 ml   Net 857 ml       Lines/Drains/Airways       None                    Physical Exam  Constitutional:       Appearance: Normal appearance. She is not ill-appearing.   Eyes:      General: Scleral icterus present.   Cardiovascular:      Rate and Rhythm: Normal rate and regular rhythm.      Pulses: Normal pulses.      Heart sounds: Normal heart sounds.   Pulmonary:      Effort: Pulmonary effort is normal. No respiratory distress.      Breath sounds: Normal breath sounds.   Abdominal:      General: Bowel sounds are normal. There is no distension.      Palpations: Abdomen is soft.      Tenderness: There is no abdominal tenderness.   Skin:     General: Skin is warm and dry.      Coloration: Skin is not jaundiced.   Neurological:      Mental Status: She is alert and oriented to person, place, and time.        Significant Labs:  All pertinent lab results from the last 24 hours have been reviewed.    Significant Imaging:  Imaging results within the past 24 hours have been reviewed.    Assessment/Plan:     GI  Acute recurrent pancreatitis  This is a 63 year old female with a PMH significant for CAD (status post PCI in 2014), DVT (on Apixaban), MURTAZA, pancreatitis (05/2023; attributed to TRULICITY usage), peripheral artery disease, and T2DM who was admitted to [facility] on 06/09 for management of T2DM with HHS. Hospital course associated with onset of new hyperbilirubinemia and transaminitis on 06/11 with elevated lipase (>1000), but without patient reporting abdominal pain. Work-up significant for MRI/MRCP on 06/11 showing inflammatory changes around pancreatic head (unclear if new or residual inflammation from prior episode of pancreatitis) with gallstone sludge, but without biliary dilatation or choledocholithiasis. AES consulted for evaluation of recurrent pancreatitis of unclear etiology.      Recommendations:     -Will tentatively plan for EUS of pancreatic abnormalities seen on MRI on 06/14 to allow for 48 hour hold of Apixaban.   -Okay for diet and NPO for 06/14.   -Initiate maintenance IVF with LR.   -General surgery evaluation for consideration of cholecystectomy given recurrent pancreatitis with gallstone sludge noted on imaging.   -CMP daily.         Thank you for your consult. I will follow-up with patient. Please contact us if you have any additional questions.    Vijay Bonilla MD  Gastroenterology  [facility] - Telemetry Stepdown

## 2023-06-12 NOTE — ASSESSMENT & PLAN NOTE
"Lipase >2000 on presentation, downtrending without any identifiable cause for elevation. Patient without any abdominal pain.  - US abdomen with gallbladder sludge and "prominence" of the pancreatic duct   - liver enzymes, alk-phos, bilirubin elevated on 06/11, suggestive of possible choledocholithiasis as cause of pancreatitis.  MRCP ordered  - MRCP without any focal obstruction or retained stones, however liver enzymes continue to elevate  - discontinue HCTZ as it could potentially be cause of pancreatitis as well    "

## 2023-06-12 NOTE — SUBJECTIVE & OBJECTIVE
"Interval HPI:   Overnight events:  No acute events reported overnight  Eatin%  Nausea: No  Hypoglycemia and intervention: No  Fever: No  TPN and/or TF: No  If yes, type of TF/TPN and rate: NA    BP (!) 103/51 (BP Location: Left arm, Patient Position: Lying)   Pulse 79   Temp 97.8 °F (36.6 °C) (Oral)   Resp 18   Ht 5' 4" (1.626 m)   Wt 62.8 kg (138 lb 7.2 oz)   LMP  (LMP Unknown)   SpO2 (!) 91%   BMI 23.76 kg/m²     Labs Reviewed and Include    Recent Labs   Lab 23  0338   *   CALCIUM 9.4   ALBUMIN 2.6*   PROT 7.2   *   K 5.9*   CO2 18*   CL 98   BUN SEE COMMENT   CREATININE SEE COMMENT   ALKPHOS SEE COMMENT   ALT 83*   AST SEE COMMENT   BILITOT SEE COMMENT     Lab Results   Component Value Date    WBC 9.49 2023    HGB 11.6 (L) 2023    HCT 35.5 (L) 2023    MCV 82 2023     2023     No results for input(s): TSH, FREET4 in the last 168 hours.  Lab Results   Component Value Date    HGBA1C 8.1 (H) 2023       Nutritional status:   Body mass index is 23.76 kg/m².  Lab Results   Component Value Date    ALBUMIN 2.6 (L) 2023    ALBUMIN 2.5 (L) 2023    ALBUMIN 3.1 (L) 2023     No results found for: PREALBUMIN    Estimated Creatinine Clearance: 38.7 mL/min (based on SCr of 1.3 mg/dL).    Accu-Checks  Recent Labs     06/10/23  1459 06/10/23  1630 06/10/23  1731 06/10/23  2048 06/10/23  2345 23  0737 23  1156 23  1646 23  2134 23  0724   POCTGLUCOSE 139* 149* 144* 208* 207* 262* 359* 342* 253* 293*       Current Medications and/or Treatments Impacting Glycemic Control  Immunotherapy:    Immunosuppressants       None          Steroids:   Hormones (From admission, onward)      Start     Stop Route Frequency Ordered    23  melatonin tablet 6 mg         -- Oral Nightly PRN 23 193          Pressors:    Autonomic Drugs (From admission, onward)      None          Hyperglycemia/Diabetes " Medications:   Antihyperglycemics (From admission, onward)      Start     Stop Route Frequency Ordered    06/12/23 0715  insulin aspart U-100 pen 7 Units         -- SubQ 3 times daily with meals 06/11/23 1823    06/11/23 2100  insulin detemir U-100 (Levemir) pen 16 Units         -- SubQ Nightly 06/11/23 1028    06/10/23 1315  insulin regular in 0.9 % NaCl 100 unit/100 mL (1 unit/mL) infusion        Question:  Enter initial dose (Units/hr):  Answer:  0.6    06/10 2200 IV Continuous 06/10/23 1211    06/10/23 1309  insulin aspart U-100 pen 0-5 Units         -- SubQ As needed (PRN) 06/10/23 1211

## 2023-06-12 NOTE — ASSESSMENT & PLAN NOTE
This is a 63 year old female with a PMH significant for CAD (status post PCI in 2014), DVT (on Apixaban), MURTAZA, pancreatitis (05/2023; attributed to TRULICITY usage), peripheral artery disease, and T2DM who was admitted to [facility] on 06/09 for management of T2DM with HHS. Hospital course associated with onset of new hyperbilirubinemia and transaminitis on 06/11 with elevated lipase (>1000), but without patient reporting abdominal pain. Work-up significant for MRI/MRCP on 06/11 showing inflammatory changes around pancreatic head (unclear if new or residual inflammation from prior episode of pancreatitis), but without biliary dilatation or cholelithiasis. AES consulted for evaluation of recurrent pancreatitis of unclear etiology.     Recommendations:     -Will tentatively plan for EUS of pancreatic abnormalities seen on MRI on 06/14 to allow for 48 hour hold of Apixaban.   -Okay for diet and NPO for 06/14.   -Initiate maintenance IVF with LR.   -CMP daily.

## 2023-06-12 NOTE — ASSESSMENT & PLAN NOTE
- continue home pletal and ASA and statin  - started on apixaban for PAD outpatient as well, holding in setting of upcoming procedure on 6/14

## 2023-06-12 NOTE — ASSESSMENT & PLAN NOTE
- Improved/resolved. Likely 2/2 IVVD 2/2 HHS on admission; baseline Cr appears to be around 1.1-1.2  - Management per primary

## 2023-06-12 NOTE — PLAN OF CARE
Problem: Adult Inpatient Plan of Care  Goal: Plan of Care Review  Outcome: Ongoing, Progressing  Goal: Patient-Specific Goal (Individualized)  Outcome: Ongoing, Progressing  Goal: Absence of Hospital-Acquired Illness or Injury  Outcome: Ongoing, Progressing  Goal: Optimal Comfort and Wellbeing  Outcome: Ongoing, Progressing  Goal: Readiness for Transition of Care  Outcome: Ongoing, Progressing     Problem: Diabetic Ketoacidosis  Goal: Fluid and Electrolyte Balance with Absence of Ketosis  Outcome: Ongoing, Progressing     Problem: Diabetes Comorbidity  Goal: Blood Glucose Level Within Targeted Range  Outcome: Ongoing, Progressing     Problem: Fluid and Electrolyte Imbalance (Acute Kidney Injury/Impairment)  Goal: Fluid and Electrolyte Balance  Outcome: Ongoing, Progressing     Problem: Oral Intake Inadequate (Acute Kidney Injury/Impairment)  Goal: Optimal Nutrition Intake  Outcome: Ongoing, Progressing     Problem: Renal Function Impairment (Acute Kidney Injury/Impairment)  Goal: Effective Renal Function  Outcome: Ongoing, Progressing    AAOx4. Rm air.  at bedside. No complaints voiced during shift. No falls or injuries throughout the night. POC reviewed. Safety measures remain intact. Care is on going.

## 2023-06-12 NOTE — PLAN OF CARE
Problem: Adult Inpatient Plan of Care  Goal: Plan of Care Review  Outcome: Ongoing, Progressing  Goal: Patient-Specific Goal (Individualized)  Outcome: Ongoing, Progressing  Goal: Absence of Hospital-Acquired Illness or Injury  Outcome: Ongoing, Progressing  Goal: Optimal Comfort and Wellbeing  Outcome: Ongoing, Progressing  Goal: Readiness for Transition of Care  Outcome: Ongoing, Progressing     Problem: Diabetic Ketoacidosis  Goal: Fluid and Electrolyte Balance with Absence of Ketosis  Outcome: Ongoing, Progressing     Problem: Diabetes Comorbidity  Goal: Blood Glucose Level Within Targeted Range  Outcome: Ongoing, Progressing     Problem: Fluid and Electrolyte Imbalance (Acute Kidney Injury/Impairment)  Goal: Fluid and Electrolyte Balance  Outcome: Ongoing, Progressing     Problem: Oral Intake Inadequate (Acute Kidney Injury/Impairment)  Goal: Optimal Nutrition Intake  Outcome: Ongoing, Progressing     Problem: Renal Function Impairment (Acute Kidney Injury/Impairment)  Goal: Effective Renal Function  Outcome: Ongoing, Progressing  Pt. Laying in bed in side lying position resp even and unlabored no distress noted at this time safety precautions maintained.

## 2023-06-12 NOTE — HPI
Ms. Mariann Huff is a 63 year old female for whom AES is consulted with concern for biliary obstruction. She has a PMH significant for CAD (status post PCI in 2014), DVT (on Apixaban), MURTAZA, pancreatitis (05/2023; suspected secondary to TRULICITY), peripheral artery disease, and T2DM.     Hospital Course:   Patient was admitted to [facility] on 06/09 for management of T2DM with HHS requiring initiation of IVF and insulin drip. On 06/11, patient noted to develop new hyperbilirubinemia (2.2) and transaminitis (AST 91, ALT 51). MRI/MRCP was without biliary dilatation or cholelithiasis, but did show possible inflammatory changes around pancreatic head. AES consulted for further evaluation.     On initial bedside interview, patient denied abdominal pain, nausea, vomiting, prior episodes of pancreatitis before 05/2023, family history of gastric, colon, or pancreatic cancer, skin/eye yellowing, and prior abdominal surgeries.

## 2023-06-12 NOTE — PROGRESS NOTES
[facility] - Telemetry [facility] Medicine  Progress Note    Patient Name: Mariann Huff  MRN: [id number]  Patient Class: IP- Inpatient   Admission Date: 6/9/2023  Length of Stay: 3 days  Attending Physician: Gregory Landers MD  Primary Care Provider: Jasbir Haney MD        Subjective:     Principal Problem:Type 2 diabetes mellitus with hyperosmolar hyperglycemic state (HHS)        HPI:  63F with PMH of CAD with GINGER , HTN, HLD, DM2, MURTAZA (not on CPAP), PAD, Afib and hx of DVT on Eliquis and recent admission for pancreatitis wo presents to the ED with nausea and vomiting. Patient recently admitted 05/25 to 05/26 for first episode of acute pancreatitis. Lipase >2000 at that time. CT abdomen and US without evidence of biliary obstruction or pancreatic inflammation. Lipid panel wnl. Patient pain and n/v improved with treatment for acute pancreatitis felt secondary to home med Trulicity. She was discharged with new insulin regimen which she reports she never picked up from pharmacy so has been taking farxiga only at home. States that she has had progressively worsening n/v and po intolerance. States that she is not having severe pain similar to recent pancreatitis. Denies fevers, chills, chest pain, shortness of breath.     In the ED patient afebrile and hemodynamically stable saturating well on room air. Lipase >2000. BG >700 and serum osmo 326 after 1L IVFs. B-hydroxybutyrate 1.3. pH 7.4 and bicarb 34. AG 12. Patient started on aggressive IVFs and insulin gtt for management on pancreatitis and HHS. Admitted to the care of medicine for further evaluation and management.       Overview/Hospital Course:  Patient transitioned from insulin drip to subcutaneous insulin on 06/10.  On 06/11, patient developed new hyperbilirubinemia and worsening liver enzymes and given history of pancreatitis concern for possible choledocholithiasis.  MRCP ordered      No new subjective & objective note has been filed under this hospital  "service since the last note was generated.      Assessment/Plan:      * Type 2 diabetes mellitus with hyperosmolar hyperglycemic state (HHS)  IP HHS/DKA Pathway initiated. On presentation BG >700 and serum osmo 326 after 1L IVFs. B-hydroxybutyrate 1.3. pH 7.4 and bicarb 34. AG 12. Patient was started on insulin drip on admission but later in the day, patient transitioned to Levemir 14 units at night and aspart 3 units with meals.  Started on diabetic diet  - patient currently on Levemir 20 units nightly, 7 units aspart with meals  - further insulin titration per endocrinology  - POC AC/HS      Hyperbilirubinemia  Given worsening liver enzymes, alk-phos, bilirubin concern for ongoing obstructive process.  - AES consulted, plan for EUS of pancreas on 06/14.  Apixaban held in preparation      History of DVT (deep vein thrombosis)  - hold home Eliquis      Acute recurrent pancreatitis  Lipase >2000 on presentation, downtrending without any identifiable cause for elevation. Patient without any abdominal pain.  - US abdomen with gallbladder sludge and "prominence" of the pancreatic duct   - liver enzymes, alk-phos, bilirubin elevated on 06/11, suggestive of possible choledocholithiasis as cause of pancreatitis.  MRCP ordered  - MRCP without any focal obstruction or retained stones, however liver enzymes continue to elevate  - discontinue HCTZ as it could potentially be cause of pancreatitis as well      PAD (peripheral artery disease)  - continue home pletal and ASA and statin  - started on apixaban for PAD outpatient as well, holding in setting of upcoming procedure on 6/14      Asthma  - albuterol prn      PAPA (acute kidney injury)  Creatinine 1.7 on presentation ; baseline 1.0  - IVFs stopped, recurrent PAPA on 6/12 with FeNA indication pre-renal disease  - 1L LR bolus ordere luís 3 hrs    - avoid nephrotoxic agents as appropriate  - continue to monitor      Hyperlipidemia associated with type 2 diabetes " mellitus  Given rise in liver enzymes, discontinuing atorvastatin    Hypertension associated with diabetes  - continue metoprolol  - holding home losartan for PAPA  - holding amlodipine given normotension and concern normotension may be causing PAPA  - discontinued HCTZ altogether given concerns of recurrent pancreatitis  - hydralazine prn      VTE Risk Mitigation (From admission, onward)    None          Discharge Planning   REBECCA: 6/12/2023     Code Status: Full Code   Is the patient medically ready for discharge?: No    Reason for patient still in hospital (select all that apply): Patient trending condition  Discharge Plan A: Home with family   Discharge Delays: None known at this time              Gregory Landers MD  Department of Hospital Medicine   [facility] - Telemetry Stepdown

## 2023-06-12 NOTE — ASSESSMENT & PLAN NOTE
- She has now had two episodes of pancreatitis on Trulicity, however suspicion for choledocholithiasis as cause for pancreatitis  - Hold Trulicity at discharge

## 2023-06-12 NOTE — SUBJECTIVE & OBJECTIVE
Past Medical History:   Diagnosis Date    Anticoagulant long-term use     Asthma     Back pain     Bradycardia, unspecified 2019    The etiology of the bradycardic episode is unclear.  The have appear to be respiratory in origin (at least the 1st episode).  We will continue to monitor carefully.  We are awaiting evaluation by Cardiology.      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, with long-term current use of insulin 2020       Past Surgical History:   Procedure Laterality Date    ANTEGRADE NEPHROSTOGRAPHY Left 2019    Procedure: Nephrostogram - antegrade;  Surgeon: Robin Boyd MD;  Location: [facility] OR [address] [place] [address];  Service: Urology;  Laterality: Left;    BRONCHOSCOPY N/A 2019    Procedure: BRONCHOSCOPY;  Surgeon: Sean Ruano MD;  Location: [facility] OR [address] [place] [address];  Service: General;  Laterality: N/A;    CARDIAC CATHETERIZATION      CATARACT EXTRACTION      cataract extraction left eye      cataracts      CAUDAL EPIDURAL STEROID INJECTION N/A 2019    Procedure: Injection-steroid-epidural-caudal;  Surgeon: Dave Bentley Jr., MD;  Location: [facility] PAIN MGT;  Service: Pain Management;  Laterality: N/A;     SECTION, LOW TRANSVERSE      COLONOSCOPY N/A 2019    Procedure: COLONOSCOPY;  Surgeon: Al Alaniz MD;  [facility]  Service: Endoscopy;  Laterality: N/A;    CORONARY ANGIOPLASTY      CYSTOGRAM N/A  12/11/2019    Procedure: CYSTOGRAM INTRAOP;  Surgeon: Robin Boyd MD;  [facility];  Service: Urology;  Laterality: N/A;  1 HOUR    CYSTOSCOPY W/ RETROGRADES Left 12/11/2019    Procedure: CYSTOSCOPY, WITH RETROGRADE PYELOGRAM;  Surgeon: Robin Boyd MD;  [facility];  Service: Urology;  Laterality: Left;  fluro    ESOPHAGOGASTRODUODENOSCOPY W/ PEG  5/2/2019    Procedure: EGD, WITH PEG TUBE INSERTION;  Surgeon: Sean Ruano MD;  [facility];  Service: General;;    EXCISION TURBINATE, SUBMUCOUS      FLEXIBLE SIGMOIDOSCOPY N/A 5/13/2019    Procedure: SIGMOIDOSCOPY, FLEXIBLE;  Surgeon: ALBERTO Amin MD;  Location: [facility] ENDO (2ND FLR);  Service: Endoscopy;  Laterality: N/A;    FLEXIBLE SIGMOIDOSCOPY N/A 5/21/2019    Procedure: SIGMOIDOSCOPY, FLEXIBLE;  Surgeon: ALBERTO Amin MD;  Location: [facility] ENDO ([facility] FLR);  Service: Endoscopy;  Laterality: N/A;    FUSION OF LUMBAR SPINE BY ANTERIOR APPROACH Left 4/12/2019    Procedure: FUSION, SPINE, LUMBAR, ANTERIOR APPROACH Left L5-S1 Anterior to Psoa Interbody Fusion, L5-S1 Posterior Instrumentation;  Surgeon: Mk George MD;  Location: [address];  Service: Neurosurgery;  Laterality: Left;  Porcedure:  Left L5-S1 Anterior to Psoa Interbody Fusion, L5-S1 Posterior Instrumentation  Surgery Time: 4 Hrs  LOS: 2-3  Anesthesia: General   Blood: Type & Screen  R    HAND SURGERY Left     HAND SURGERY Right     torn ligament repair/ Dr. Yeboah    HYSTERECTOMY      INJECTION OF STEROID Right 12/10/2020    Procedure: INJECTION, STEROID Right SI Joint Block and Steroid Injection;  Surgeon: Mk George MD;  Location: [facility];  Service: Neurosurgery;  Laterality: Right;  Procedure: Right SI Joint Block and Steroid Injection   SUrgery Time: 30 Min  LOS: 0  Anesthesia: MAC  Radiology: C-arm  Bed: [place] [address] Poster  Position: Prone    INJECTION OF STEROID Right 9/28/2021    Procedure: INJECTION, STEROID Right SI joint  block & steroid injection;  Surgeon: Mk George MD;  [facility];  Service: Neurosurgery;  Laterality: Right;  Procedure: Right SI joint block & steroid injection  Surgery Time: 30m  Anesthesia: Local MAC  Radiology: C-arm  Bed: Regular  Position: Prone    left foot surgery      left wrist surgery      LYSIS OF ADHESIONS N/A 2/19/2020    Procedure: LYSIS, ADHESIONS;  Surgeon: Robin Boyd MD;  [facility];  Service: Urology;  Laterality: N/A;    NASAL SEPTUM SURGERY  5/7/15    PERCUTANEOUS NEPHROSTOMY Left 4/21/2019    Procedure: Creation, Nephrostomy, Percutaneous;  Surgeon: Karina Surgeon;  [facility];  Service: Anesthesiology;  Laterality: Left;    REPAIR OF URETER  4/12/2019    Procedure: REPAIR, URETER;  Surgeon: Mk George MD;  [address];  Service: Neurosurgery;;    REPLACEMENT OF NEPHROSTOMY TUBE N/A 7/18/2019    Procedure: REPLACEMENT, NEPHROSTOMY TUBE;  Surgeon: [facility] Diagnostic Provider;  [address];  Service: Anesthesiology;  Laterality: N/A;  188    REPLACEMENT OF NEPHROSTOMY TUBE N/A 7/24/2019    Procedure: REPLACEMENT, NEPHROSTOMY TUBE;  Surgeon: [facility] Diagnostic Provider;  [facility];  Service: Anesthesiology;  Laterality: N/A;  188    REPLACEMENT OF NEPHROSTOMY TUBE N/A 10/7/2019    Procedure: REPLACEMENT, NEPHROSTOMY TUBE;  Surgeon: [facility] Diagnostic Provider;  [facility];  Service: Anesthesiology;  Laterality: N/A;  189    REPLACEMENT OF NEPHROSTOMY TUBE N/A 11/25/2019    Procedure: REPLACEMENT, NEPHROSTOMY TUBE;  Surgeon: [facility] Diagnostic Provider;  [facility];  Service: Anesthesiology;  Laterality: N/A;  Room 188/Bessy    REPLACEMENT OF NEPHROSTOMY TUBE Right 2/19/2020    Procedure: REPLACEMENT, NEPHROSTOMY TUBE removal removal;  Surgeon: Robin Boyd MD;  [facility];  Service: Urology;  Laterality: Right;    rt elbow surgery      S/P LAD COATED STENT  05/14/2010    6  total     S/P OM1 STENT  08/2003    SINUS SURGERY      F.E.S.S.    TRACHEOSTOMY N/A 5/2/2019    Procedure: CREATION, TRACHEOSTOMY;  Surgeon: Sean Ruano MD;  Location: [facility] OR [address] [place] [address];  Service: General;  Laterality: N/A;    TUBAL LIGATION      URETERAL REIMPLANTION Left 2/19/2020    Procedure: REIMPLANTATION, URETER WITH PSOAS HITCH;  Surgeon: Robin Boyd MD;  Location: [facility] OR [address] [place] [address];  Service: Urology;  Laterality: Left;       Review of patient's allergies indicates:   Allergen Reactions    Milk containing products (dairy)      Causes GI distress     Family History       Problem Relation (Age of Onset)    Diabetes Mother, Father, Sister, Brother, Sister    Heart attack Father    Heart disease Mother    Leukemia Father    No Known Problems Sister, Brother, Brother, Maternal Grandmother, Maternal Grandfather, Paternal Grandmother, Paternal Grandfather, Son, Son, Maternal Aunt, Maternal Uncle, Paternal Aunt, Paternal Uncle          Tobacco Use    Smoking status: Never    Smokeless tobacco: Never   Substance and Sexual Activity    Alcohol use: No     Alcohol/week: 0.0 standard drinks    Drug use: No    Sexual activity: Yes     Partners: Male     Birth control/protection: Post-menopausal     Comment:      Review of Systems   Constitutional:  Positive for fatigue. Negative for appetite change, chills and fever.   HENT:  Negative for congestion and trouble swallowing.    Eyes:  Negative for pain and redness.   Respiratory:  Negative for cough and shortness of breath.    Cardiovascular:  Negative for chest pain and palpitations.   Gastrointestinal:  Negative for abdominal pain, constipation, diarrhea, nausea and vomiting.   Genitourinary:  Negative for difficulty urinating and dysuria.   Musculoskeletal:  Negative for back pain and neck pain.   Skin:  Negative for rash.   Neurological:  Negative for dizziness, light-headedness and headaches.   Objective:     Vital Signs (Most Recent):  Temp: 97.7 °F  (36.5 °C) (06/12/23 1201)  Pulse: 71 (06/12/23 1201)  Resp: 18 (06/12/23 1201)  BP: (!) 117/58 (06/12/23 1201)  SpO2: 95 % (06/12/23 1201) Vital Signs (24h Range):  Temp:  [96.6 °F (35.9 °C)-98.6 °F (37 °C)] 97.7 °F (36.5 °C)  Pulse:  [71-79] 71  Resp:  [18] 18  SpO2:  [91 %-98 %] 95 %  BP: (103-154)/(51-74) 117/58     Weight: 62.8 kg (138 lb 7.2 oz) (06/09/23 2311)  Body mass index is 23.76 kg/m².      Intake/Output Summary (Last 24 hours) at 6/12/2023 1427  Last data filed at 6/12/2023 1329  Gross per 24 hour   Intake 860 ml   Output 3 ml   Net 857 ml       Lines/Drains/Airways       None                    Physical Exam  Constitutional:       Appearance: Normal appearance. She is not ill-appearing.   Eyes:      General: Scleral icterus present.   Cardiovascular:      Rate and Rhythm: Normal rate and regular rhythm.      Pulses: Normal pulses.      Heart sounds: Normal heart sounds.   Pulmonary:      Effort: Pulmonary effort is normal. No respiratory distress.      Breath sounds: Normal breath sounds.   Abdominal:      General: Bowel sounds are normal. There is no distension.      Palpations: Abdomen is soft.      Tenderness: There is no abdominal tenderness.   Skin:     General: Skin is warm and dry.      Coloration: Skin is not jaundiced.   Neurological:      Mental Status: She is alert and oriented to person, place, and time.        Significant Labs:  All pertinent lab results from the last 24 hours have been reviewed.    Significant Imaging:  Imaging results within the past 24 hours have been reviewed.

## 2023-06-12 NOTE — CARE UPDATE
"RAPID RESPONSE NURSE CHART REVIEW       Chart Reviewed: 06/12/2023, 11:07 AM    MRN: [id number]  Bed: 8084/8084 A    Dx: Type 2 diabetes mellitus with hyperosmolar hyperglycemic state (HHS)    Mariann Huff has a past medical history of Anticoagulant long-term use, Asthma, Back pain, Bradycardia, unspecified, CAD (coronary artery disease), Carotid artery stenosis, Chronic combined systolic and diastolic CHF (congestive heart failure), Diabetes mellitus type 2 in obese, HTN (hypertension), benign, Hyperlipidemia, Keloid cicatrix, NSTEMI (non-ST elevated myocardial infarction), Nuclear sclerosis - Right Eye, Primary localized osteoarthrosis, lower leg, Senile nuclear sclerosis, Sleep apnea, and 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    Last VS: BP (!) 103/51 (BP Location: Left arm, Patient Position: Lying)   Pulse 79   Temp 97.8 °F (36.6 °C) (Oral)   Resp 18   Ht 5' 4" (1.626 m)   Wt 62.8 kg (138 lb 7.2 oz)   LMP  (LMP Unknown)   SpO2 (!) 91%   BMI 23.76 kg/m²     24H Vital Sign Range:  Temp:  [96.6 °F (35.9 °C)-98.6 °F (37 °C)]   Pulse:  [75-79]   Resp:  [18-19]   BP: (103-154)/(51-74)   SpO2:  [91 %-99 %]     Level of Consciousness (AVPU): unresponsive    Recent Labs     06/10/23  0336 06/11/23  0405 06/12/23  0338   WBC 9.59 8.48 9.49   HGB 13.4 10.7* 11.6*   HCT 44.4 36.8* 35.5*    296 278       Recent Labs     06/10/23  0336 06/11/23  0405 06/12/23  0338 06/12/23  0758    137 133* 136   K 4.0 3.7 5.9* 4.2    97 98 98   CO2 31* 26 18* 26   CREATININE 1.4 1.3 SEE COMMENT 1.6*   * 198* 245* 295*   PHOS 2.2* 2.9 3.2  --    MG 3.0* 2.4 SEE COMMENT  --         Recent Labs     06/09/23  1807   PH 7.412   PCO2 59.4*   PO2 15*   HCO3 37.8*   POCSATURATED 18*   BE 13        OXYGEN:             MEWS score: 1    charge RNReji  contacted. No concerns verbalized at this " time. Instructed to call [phone] for further concerns or assistance.    Juan Mcfarlane RN

## 2023-06-12 NOTE — ASSESSMENT & PLAN NOTE
- HHS on admission. Recent pancreatitis admission, not yet back to normal po intake. Taking Glipizide and Farxiga at home. Never picked up Levemir Rx recently prescribed by her PC  - 24 hour glucose trend:  Uncontrolled in the mid 200s and low 300s    - Increase Levemir from 16 up to 20 units every night starting this evening  - Continue aspart 7 units TIDAC+LDC

## 2023-06-12 NOTE — ASSESSMENT & PLAN NOTE
IP HHS/DKA Pathway initiated. On presentation BG >700 and serum osmo 326 after 1L IVFs. B-hydroxybutyrate 1.3. pH 7.4 and bicarb 34. AG 12. Patient was started on insulin drip on admission but later in the day, patient transitioned to Levemir 14 units at night and aspart 3 units with meals.  Started on diabetic diet  - patient currently on Levemir 20 units nightly, 7 units aspart with meals  - further insulin titration per endocrinology  - POC AC/HS

## 2023-06-12 NOTE — NURSING
Bedside handoff report completed. Pt laying in left side lying position resp even and unlabored no distress noted at this time safety precautions maintained.

## 2023-06-12 NOTE — ASSESSMENT & PLAN NOTE
Creatinine 1.7 on presentation ; baseline 1.0  - IVFs stopped, recurrent PAPA on 6/12 with FeNA indication pre-renal disease  - 1L LR bolus ordere luís 3 hrs    - avoid nephrotoxic agents as appropriate  - continue to monitor

## 2023-06-12 NOTE — ASSESSMENT & PLAN NOTE
Given worsening liver enzymes, alk-phos, bilirubin concern for ongoing obstructive process.  - AES consulted, plan for EUS of pancreas on 06/14.  Apixaban held in preparation

## 2023-06-13 ENCOUNTER — ANESTHESIA EVENT (OUTPATIENT)
Dept: ENDOSCOPY | Facility: HOSPITAL | Age: 63
DRG: 987 | End: 2023-06-13
Payer: COMMERCIAL

## 2023-06-13 PROBLEM — R65.20 SEPSIS WITH ACUTE LIVER FAILURE WITHOUT HEPATIC COMA OR SEPTIC SHOCK: Status: ACTIVE | Noted: 2019-04-20

## 2023-06-13 PROBLEM — K72.00 SEPSIS WITH ACUTE LIVER FAILURE WITHOUT HEPATIC COMA OR SEPTIC SHOCK: Status: ACTIVE | Noted: 2019-04-20

## 2023-06-13 LAB
ALBUMIN SERPL BCP-MCNC: 2.4 G/DL (ref 3.5–5.2)
ALP SERPL-CCNC: 1010 U/L (ref 55–135)
ALT SERPL W/O P-5'-P-CCNC: 309 U/L (ref 10–44)
AMMONIA PLAS-SCNC: 33 UMOL/L (ref 10–50)
ANION GAP SERPL CALC-SCNC: 18 MMOL/L (ref 8–16)
ANISOCYTOSIS BLD QL SMEAR: SLIGHT
AST SERPL-CCNC: 555 U/L (ref 10–40)
BACTERIA #/AREA URNS AUTO: ABNORMAL /HPF
BASOPHILS # BLD AUTO: 0.02 K/UL (ref 0–0.2)
BASOPHILS NFR BLD: 0.1 % (ref 0–1.9)
BILIRUB SERPL-MCNC: 6.2 MG/DL (ref 0.1–1)
BILIRUB UR QL STRIP: ABNORMAL
BUN SERPL-MCNC: 23 MG/DL (ref 8–23)
CALCIUM SERPL-MCNC: 9.5 MG/DL (ref 8.7–10.5)
CHLORIDE SERPL-SCNC: 98 MMOL/L (ref 95–110)
CLARITY UR REFRACT.AUTO: ABNORMAL
CO2 SERPL-SCNC: 24 MMOL/L (ref 23–29)
COLOR UR AUTO: YELLOW
CREAT SERPL-MCNC: 1.2 MG/DL (ref 0.5–1.4)
DIFFERENTIAL METHOD: ABNORMAL
EOSINOPHIL # BLD AUTO: 0 K/UL (ref 0–0.5)
EOSINOPHIL NFR BLD: 0 % (ref 0–8)
ERYTHROCYTE [DISTWIDTH] IN BLOOD BY AUTOMATED COUNT: 12.9 % (ref 11.5–14.5)
EST. GFR  (NO RACE VARIABLE): 51.2 ML/MIN/1.73 M^2
FIBRINOGEN PPP-MCNC: >800 MG/DL (ref 182–400)
GLUCOSE SERPL-MCNC: 151 MG/DL (ref 70–110)
GLUCOSE UR QL STRIP: ABNORMAL
HCT VFR BLD AUTO: 37.5 % (ref 37–48.5)
HGB BLD-MCNC: 11.9 G/DL (ref 12–16)
HGB UR QL STRIP: ABNORMAL
IMM GRANULOCYTES # BLD AUTO: 0.12 K/UL (ref 0–0.04)
IMM GRANULOCYTES NFR BLD AUTO: 0.6 % (ref 0–0.5)
INR PPP: 1.2 (ref 0.8–1.2)
KETONES UR QL STRIP: ABNORMAL
LACTATE SERPL-SCNC: 1.3 MMOL/L (ref 0.5–2.2)
LEUKOCYTE ESTERASE UR QL STRIP: NEGATIVE
LYMPHOCYTES # BLD AUTO: 0.7 K/UL (ref 1–4.8)
LYMPHOCYTES NFR BLD: 3.4 % (ref 18–48)
MAGNESIUM SERPL-MCNC: 2.3 MG/DL (ref 1.6–2.6)
MCH RBC QN AUTO: 26.9 PG (ref 27–31)
MCHC RBC AUTO-ENTMCNC: 31.7 G/DL (ref 32–36)
MCV RBC AUTO: 85 FL (ref 82–98)
MICROSCOPIC COMMENT: ABNORMAL
MONOCYTES # BLD AUTO: 1.4 K/UL (ref 0.3–1)
MONOCYTES NFR BLD: 7 % (ref 4–15)
NEUTROPHILS # BLD AUTO: 17.7 K/UL (ref 1.8–7.7)
NEUTROPHILS NFR BLD: 88.9 % (ref 38–73)
NITRITE UR QL STRIP: NEGATIVE
NRBC BLD-RTO: 0 /100 WBC
PH UR STRIP: 5 [PH] (ref 5–8)
PHOSPHATE SERPL-MCNC: 4 MG/DL (ref 2.7–4.5)
PLATELET # BLD AUTO: 282 K/UL (ref 150–450)
PLATELET BLD QL SMEAR: ABNORMAL
PMV BLD AUTO: 12.5 FL (ref 9.2–12.9)
POCT GLUCOSE: 139 MG/DL (ref 70–110)
POCT GLUCOSE: 162 MG/DL (ref 70–110)
POCT GLUCOSE: 164 MG/DL (ref 70–110)
POCT GLUCOSE: 172 MG/DL (ref 70–110)
POTASSIUM SERPL-SCNC: 3.6 MMOL/L (ref 3.5–5.1)
PROT SERPL-MCNC: 7 G/DL (ref 6–8.4)
PROT UR QL STRIP: ABNORMAL
PROTHROMBIN TIME: 12.5 SEC (ref 9–12.5)
RBC # BLD AUTO: 4.43 M/UL (ref 4–5.4)
RBC #/AREA URNS AUTO: 0 /HPF (ref 0–4)
SODIUM SERPL-SCNC: 140 MMOL/L (ref 136–145)
SP GR UR STRIP: 1.02 (ref 1–1.03)
SQUAMOUS #/AREA URNS AUTO: 4 /HPF
TRIGL SERPL-MCNC: 65 MG/DL (ref 30–150)
URN SPEC COLLECT METH UR: ABNORMAL
WBC # BLD AUTO: 19.85 K/UL (ref 3.9–12.7)
WBC #/AREA URNS AUTO: 9 /HPF (ref 0–5)
WBC CASTS UR QL COMP ASSIST: 1 /LPF
WBC CLUMPS UR QL AUTO: ABNORMAL
YEAST UR QL AUTO: ABNORMAL

## 2023-06-13 PROCEDURE — 99233 SBSQ HOSP IP/OBS HIGH 50: CPT | Mod: ,,, | Performed by: STUDENT IN AN ORGANIZED HEALTH CARE EDUCATION/TRAINING PROGRAM

## 2023-06-13 PROCEDURE — 99232 PR SUBSEQUENT HOSPITAL CARE,LEVL II: ICD-10-PCS | Mod: ,,, | Performed by: INTERNAL MEDICINE

## 2023-06-13 PROCEDURE — 84100 ASSAY OF PHOSPHORUS: CPT | Performed by: FAMILY MEDICINE

## 2023-06-13 PROCEDURE — 85610 PROTHROMBIN TIME: CPT | Performed by: STUDENT IN AN ORGANIZED HEALTH CARE EDUCATION/TRAINING PROGRAM

## 2023-06-13 PROCEDURE — 99232 SBSQ HOSP IP/OBS MODERATE 35: CPT | Mod: ,,, | Performed by: INTERNAL MEDICINE

## 2023-06-13 PROCEDURE — 25000003 PHARM REV CODE 250: Performed by: STUDENT IN AN ORGANIZED HEALTH CARE EDUCATION/TRAINING PROGRAM

## 2023-06-13 PROCEDURE — 83605 ASSAY OF LACTIC ACID: CPT | Performed by: STUDENT IN AN ORGANIZED HEALTH CARE EDUCATION/TRAINING PROGRAM

## 2023-06-13 PROCEDURE — 87040 BLOOD CULTURE FOR BACTERIA: CPT | Performed by: STUDENT IN AN ORGANIZED HEALTH CARE EDUCATION/TRAINING PROGRAM

## 2023-06-13 PROCEDURE — 63600175 PHARM REV CODE 636 W HCPCS: Performed by: STUDENT IN AN ORGANIZED HEALTH CARE EDUCATION/TRAINING PROGRAM

## 2023-06-13 PROCEDURE — 85025 COMPLETE CBC W/AUTO DIFF WBC: CPT | Performed by: FAMILY MEDICINE

## 2023-06-13 PROCEDURE — 80053 COMPREHEN METABOLIC PANEL: CPT | Performed by: STUDENT IN AN ORGANIZED HEALTH CARE EDUCATION/TRAINING PROGRAM

## 2023-06-13 PROCEDURE — 85384 FIBRINOGEN ACTIVITY: CPT | Performed by: STUDENT IN AN ORGANIZED HEALTH CARE EDUCATION/TRAINING PROGRAM

## 2023-06-13 PROCEDURE — 82140 ASSAY OF AMMONIA: CPT | Performed by: STUDENT IN AN ORGANIZED HEALTH CARE EDUCATION/TRAINING PROGRAM

## 2023-06-13 PROCEDURE — 20600001 HC STEP DOWN PRIVATE ROOM

## 2023-06-13 PROCEDURE — 83735 ASSAY OF MAGNESIUM: CPT | Performed by: FAMILY MEDICINE

## 2023-06-13 PROCEDURE — 25000003 PHARM REV CODE 250: Performed by: FAMILY MEDICINE

## 2023-06-13 PROCEDURE — S0030 INJECTION, METRONIDAZOLE: HCPCS | Performed by: STUDENT IN AN ORGANIZED HEALTH CARE EDUCATION/TRAINING PROGRAM

## 2023-06-13 PROCEDURE — 36415 COLL VENOUS BLD VENIPUNCTURE: CPT | Performed by: FAMILY MEDICINE

## 2023-06-13 PROCEDURE — 81001 URINALYSIS AUTO W/SCOPE: CPT | Performed by: STUDENT IN AN ORGANIZED HEALTH CARE EDUCATION/TRAINING PROGRAM

## 2023-06-13 PROCEDURE — 99233 PR SUBSEQUENT HOSPITAL CARE,LEVL III: ICD-10-PCS | Mod: ,,, | Performed by: STUDENT IN AN ORGANIZED HEALTH CARE EDUCATION/TRAINING PROGRAM

## 2023-06-13 PROCEDURE — 84478 ASSAY OF TRIGLYCERIDES: CPT | Performed by: STUDENT IN AN ORGANIZED HEALTH CARE EDUCATION/TRAINING PROGRAM

## 2023-06-13 PROCEDURE — 94761 N-INVAS EAR/PLS OXIMETRY MLT: CPT

## 2023-06-13 RX ORDER — METRONIDAZOLE 500 MG/100ML
500 INJECTION, SOLUTION INTRAVENOUS EVERY 8 HOURS
Status: DISCONTINUED | OUTPATIENT
Start: 2023-06-13 | End: 2023-06-13

## 2023-06-13 RX ORDER — METRONIDAZOLE 500 MG/100ML
500 INJECTION, SOLUTION INTRAVENOUS EVERY 8 HOURS
Status: DISCONTINUED | OUTPATIENT
Start: 2023-06-13 | End: 2023-06-14

## 2023-06-13 RX ADMIN — ISOSORBIDE MONONITRATE 20 MG: 20 TABLET ORAL at 08:06

## 2023-06-13 RX ADMIN — SODIUM CHLORIDE, POTASSIUM CHLORIDE, SODIUM LACTATE AND CALCIUM CHLORIDE: 600; 310; 30; 20 INJECTION, SOLUTION INTRAVENOUS at 10:06

## 2023-06-13 RX ADMIN — GABAPENTIN 600 MG: 300 CAPSULE ORAL at 08:06

## 2023-06-13 RX ADMIN — ESCITALOPRAM OXALATE 10 MG: 10 TABLET ORAL at 08:06

## 2023-06-13 RX ADMIN — CEFTRIAXONE 2 G: 2 INJECTION, POWDER, FOR SOLUTION INTRAMUSCULAR; INTRAVENOUS at 07:06

## 2023-06-13 RX ADMIN — METRONIDAZOLE 500 MG: 500 INJECTION, SOLUTION INTRAVENOUS at 09:06

## 2023-06-13 RX ADMIN — METOPROLOL TARTRATE 50 MG: 50 TABLET, FILM COATED ORAL at 08:06

## 2023-06-13 RX ADMIN — FAMOTIDINE 20 MG: 20 TABLET, FILM COATED ORAL at 08:06

## 2023-06-13 RX ADMIN — METRONIDAZOLE 500 MG: 500 INJECTION, SOLUTION INTRAVENOUS at 10:06

## 2023-06-13 RX ADMIN — GABAPENTIN 300 MG: 300 CAPSULE ORAL at 08:06

## 2023-06-13 RX ADMIN — CILOSTAZOL 100 MG: 100 TABLET ORAL at 08:06

## 2023-06-13 RX ADMIN — TRAZODONE HYDROCHLORIDE 50 MG: 50 TABLET ORAL at 08:06

## 2023-06-13 RX ADMIN — METRONIDAZOLE 500 MG: 500 INJECTION, SOLUTION INTRAVENOUS at 01:06

## 2023-06-13 NOTE — NURSING
Bedside handoff report completed. Pt laying in bed in side lying position with eyes closed arouses to voice stimuli safety precautions maintained.

## 2023-06-13 NOTE — CLINICAL REVIEW
IP Sepsis Screen (most recent)       Sepsis Screen (IP) - 06/13/23 0906       Is the patient's history or complaint suggestive of a possible infection? Yes  -LW    Are there at least two of the following signs and symptoms present? Yes  -LW    Sepsis signs/symptoms - Tachycardia Tachycardia     >90  -LW    Sepsis signs/symptoms - WBC WBC < 4,000 or WBC > 12,000  -LW    Are any of the following organ dysfunction criteria present and not considered to be due to a chronic condition? Yes  -LW    Organ Dysfunction Criteria Total Bilirubin > 2.0 Platelet count < 100,000  -LW    Initiate Sepsis Protocol No  -LW    Reason sepsis not considered Pt. receiving appropriate management  -LW              User Key  (r) = Recorded By, (t) = Taken By, (c) = Cosigned By      Initials Name    ANETTE Cox RN

## 2023-06-13 NOTE — SUBJECTIVE & OBJECTIVE
"Interval HPI:   Overnight events: Patient with worsening jaundice/LFTs prompting evaluation  Eating:   <25%  Nausea: No  Hypoglycemia and intervention: No  Fever: No  TPN and/or TF: No  If yes, type of TF/TPN and rate: NA    BP (!) 144/65 (BP Location: Right arm, Patient Position: Lying)   Pulse 96   Temp 97.6 °F (36.4 °C) (Oral)   Resp 17   Ht 5' 4" (1.626 m)   Wt 62.8 kg (138 lb 7.2 oz)   LMP  (LMP Unknown)   SpO2 98%   BMI 23.76 kg/m²     Labs Reviewed and Include    Recent Labs   Lab 06/13/23  0412   *   CALCIUM 9.5   ALBUMIN 2.4*   PROT 7.0      K 3.6   CO2 24   CL 98   BUN 23   CREATININE 1.2   ALKPHOS 1,010*   *   *   BILITOT 6.2*     Lab Results   Component Value Date    WBC 19.85 (H) 06/13/2023    HGB 11.9 (L) 06/13/2023    HCT 37.5 06/13/2023    MCV 85 06/13/2023     06/13/2023     No results for input(s): TSH, FREET4 in the last 168 hours.  Lab Results   Component Value Date    HGBA1C 8.1 (H) 05/25/2023       Nutritional status:   Body mass index is 23.76 kg/m².  Lab Results   Component Value Date    ALBUMIN 2.4 (L) 06/13/2023    ALBUMIN 2.6 (L) 06/12/2023    ALBUMIN 2.6 (L) 06/12/2023     No results found for: PREALBUMIN    Estimated Creatinine Clearance: 42 mL/min (based on SCr of 1.2 mg/dL).    Accu-Checks  Recent Labs     06/10/23  2345 06/11/23  0737 06/11/23  1156 06/11/23  1646 06/11/23  2134 06/12/23  0724 06/12/23  1229 06/12/23  1647 06/12/23  2117 06/13/23  0742   POCTGLUCOSE 207* 262* 359* 342* 253* 293* 303* 212* 156* 164*       Current Medications and/or Treatments Impacting Glycemic Control  Immunotherapy:    Immunosuppressants       None          Steroids:   Hormones (From admission, onward)      Start     Stop Route Frequency Ordered    06/09/23 2028  melatonin tablet 6 mg         -- Oral Nightly PRN 06/09/23 1931          Pressors:    Autonomic Drugs (From admission, onward)      None          Hyperglycemia/Diabetes Medications: "   Antihyperglycemics (From admission, onward)      Start     Stop Route Frequency Ordered    06/12/23 2100  insulin detemir U-100 (Levemir) pen 20 Units         -- SubQ Nightly 06/12/23 0904    06/12/23 1645  insulin aspart U-100 pen 10 Units         -- SubQ 3 times daily with meals 06/12/23 1334    06/10/23 1315  insulin regular in 0.9 % NaCl 100 unit/100 mL (1 unit/mL) infusion        Question:  Enter initial dose (Units/hr):  Answer:  0.6    06/10 2200 IV Continuous 06/10/23 1211    06/10/23 1309  insulin aspart U-100 pen 0-5 Units         -- SubQ As needed (PRN) 06/10/23 1211

## 2023-06-13 NOTE — PROGRESS NOTES
[facility] Medicine  Progress Note    Patient Name: Mariann Huff  MRN: [id number]  Patient Class: IP- Inpatient   Admission Date: 6/9/2023  Length of Stay: 4 days  Attending Physician: Gregory Landers MD  Primary Care Provider: Jasbir Haney MD        Subjective:     Principal Problem:Type 2 diabetes mellitus with hyperosmolar hyperglycemic state (HHS)        HPI:  63F with PMH of CAD with GINGER , HTN, HLD, DM2, MURTAZA (not on CPAP), PAD, Afib and hx of DVT on Eliquis and recent admission for pancreatitis wo presents to the ED with nausea and vomiting. Patient recently admitted 05/25 to 05/26 for first episode of acute pancreatitis. Lipase >2000 at that time. CT abdomen and US without evidence of biliary obstruction or pancreatic inflammation. Lipid panel wnl. Patient pain and n/v improved with treatment for acute pancreatitis felt secondary to home med Trulicity. She was discharged with new insulin regimen which she reports she never picked up from pharmacy so has been taking farxiga only at home. States that she has had progressively worsening n/v and po intolerance. States that she is not having severe pain similar to recent pancreatitis. Denies fevers, chills, chest pain, shortness of breath.     In the ED patient afebrile and hemodynamically stable saturating well on room air. Lipase >2000. BG >700 and serum osmo 326 after 1L IVFs. B-hydroxybutyrate 1.3. pH 7.4 and bicarb 34. AG 12. Patient started on aggressive IVFs and insulin gtt for management on pancreatitis and HHS. Admitted to the care of medicine for further evaluation and management.       Overview/Hospital Course:  Patient transitioned from insulin drip to subcutaneous insulin on 06/10.  On 06/11, patient developed new hyperbilirubinemia and worsening liver enzymes and given history of pancreatitis concern for possible choledocholithiasis.  MRCP ordered      Interval History: No acute events overnight.  Patient this morning it.   Confused, does not remember the events of yesterday.  Lab work concerning for elevated white blood cell count and worsening liver enzymes and bilirubin.  Patient to have EUS/ERCP performed on 06/14.  Patient to be NPO after midnight.  Given elevated white blood cell count, concern for potential cholangitis, patient started on ceftriaxone and metronidazole to cover intra-abdominal sources.  Blood cultures X 2 obtained    Review of Systems   Constitutional:  Negative for chills and fever.   HENT:  Negative for congestion and sore throat.    Eyes:  Negative for photophobia and visual disturbance.   Respiratory:  Negative for cough and shortness of breath.    Cardiovascular:  Negative for chest pain and palpitations.   Gastrointestinal:  Negative for abdominal pain, constipation, diarrhea, nausea and vomiting.   Endocrine: Negative for cold intolerance and heat intolerance.   Genitourinary:  Negative for dysuria and hematuria.   Musculoskeletal:  Negative for arthralgias and myalgias.   Skin:  Negative for rash.   Allergic/Immunologic: Negative for environmental allergies and food allergies.   Neurological:  Negative for dizziness, seizures, syncope and headaches.   Hematological:  Negative for adenopathy. Does not bruise/bleed easily.   Psychiatric/Behavioral:  Negative for hallucinations and suicidal ideas.    Objective:     Vital Signs (Most Recent):  Temp: 97.6 °F (36.4 °C) (06/13/23 1200)  Pulse: 96 (06/13/23 1200)  Resp: 15 (06/13/23 1200)  BP: 128/62 (06/13/23 1200)  SpO2: 100 % (06/13/23 1200) Vital Signs (24h Range):  Temp:  [97.6 °F (36.4 °C)-98.1 °F (36.7 °C)] 97.6 °F (36.4 °C)  Pulse:  [] 96  Resp:  [15-27] 15  SpO2:  [97 %-100 %] 100 %  BP: (102-186)/(55-85) 128/62     Weight: 62.8 kg (138 lb 7.2 oz)  Body mass index is 23.76 kg/m².    Intake/Output Summary (Last 24 hours) at 6/13/2023 1412  Last data filed at 6/13/2023 1246  Gross per 24 hour   Intake 300 ml   Output 600 ml   Net -300 ml            Physical Exam  Constitutional:       Appearance: She is well-developed.   HENT:      Head: Normocephalic and atraumatic.   Eyes:      Conjunctiva/sclera: Conjunctivae normal.      Pupils: Pupils are equal, round, and reactive to light.   Neck:      Thyroid: No thyromegaly.      Vascular: No JVD.   Cardiovascular:      Rate and Rhythm: Normal rate and regular rhythm.      Heart sounds: Normal heart sounds. No murmur heard.    No friction rub. No gallop.   Pulmonary:      Effort: Pulmonary effort is normal.      Breath sounds: Normal breath sounds. No wheezing or rales.   Abdominal:      General: Bowel sounds are normal. There is no distension.      Palpations: Abdomen is soft.      Tenderness: There is no abdominal tenderness. There is no guarding or rebound.   Musculoskeletal:         General: No tenderness. Normal range of motion.      Cervical back: Neck supple.   Skin:     General: Skin is warm and dry.   Neurological:      Mental Status: She is alert and oriented to person, place, and time.      Cranial Nerves: No cranial nerve deficit.   Psychiatric:         Behavior: Behavior normal.           Significant Labs: All pertinent labs within the past 24 hours have been reviewed.  CMP:   Recent Labs   Lab 06/12/23  0758 06/12/23  1351 06/13/23  0412    135* 140   K 4.2 4.0 3.6   CL 98 97 98   CO2 26 26 24   * 272* 151*   BUN 28* 28* 23   CREATININE 1.6* 1.3 1.2   CALCIUM 9.4 9.2 9.5   PROT 6.8 7.0 7.0   ALBUMIN 2.6* 2.6* 2.4*   BILITOT 4.4* 5.2* 6.2*   ALKPHOS 577* 736* 1,010*   * 437* 555*   * 191* 309*   ANIONGAP 12 12 18*         Significant Imaging: I have reviewed all pertinent imaging results/findings within the past 24 hours.      Assessment/Plan:      * Type 2 diabetes mellitus with hyperosmolar hyperglycemic state (HHS)  IP HHS/DKA Pathway initiated. On presentation BG >700 and serum osmo 326 after 1L IVFs. B-hydroxybutyrate 1.3. pH 7.4 and bicarb 34. AG 12. Patient was started  "on insulin drip on admission but later in the day, patient transitioned to Levemir 14 units at night and aspart 3 units with meals.  Started on diabetic diet  - patient currently on Levemir 20 units nightly, 10 units aspart with meals  - further insulin titration per endocrinology  - POC AC/HS      Hyperbilirubinemia  Given worsening liver enzymes, alk-phos, bilirubin concern for ongoing obstructive process.  - AES consulted, plan for EUS of pancreas on 06/14.  Apixaban held in preparation      History of DVT (deep vein thrombosis)  - hold home Eliquis      Acute recurrent pancreatitis  Lipase >2000 on presentation, downtrending without any identifiable cause for elevation. Patient without any abdominal pain.  - US abdomen with gallbladder sludge and "prominence" of the pancreatic duct   - liver enzymes, alk-phos, bilirubin elevated on 06/11, suggestive of possible choledocholithiasis as cause of pancreatitis.  MRCP ordered  - MRCP without any focal obstruction or retained stones, however liver enzymes continue to elevate  - discontinue HCTZ as it could potentially be cause of pancreatitis as well      PAD (peripheral artery disease)  - continue home pletal and ASA and statin  - started on apixaban for PAD outpatient as well, holding in setting of upcoming procedure on 6/14      Asthma  - albuterol prn      Sepsis with acute liver failure without hepatic coma or septic shock  This patient does have evidence of infective focus  My overall impression is sepsis.  Source: Abdominal and potentially bacteremia  Antibiotics given-   Antibiotics (72h ago, onward)    Start     Stop Route Frequency Ordered    06/13/23 1400  metronidazole IVPB 500 mg  (Sepsis Treatment Panel)         06/19 1359 IV Every 8 hours 06/13/23 0705    06/13/23 0815  cefTRIAXone (ROCEPHIN) 2 g in dextrose 5 % in water (D5W) 5 % 100 mL IVPB (MB+)  (Sepsis Treatment Panel)         06/19 0814 IV Every 24 hours (non-standard times) 06/13/23 0705    "     Latest lactate reviewed-  No results for input(s): LACTATE in the last 72 hours.  Organ dysfunction indicated by Acute liver injury    Fluid challenge Not needed - patient is not hypotensive      Post- resuscitation assessment No - Post resuscitation assessment not needed       Will Not start Pressors- Levophed for MAP of 65  Source control achieved by: Ceftriaxone/metronidazole    PAPA (acute kidney injury)  Creatinine 1.7 on presentation ; baseline 1.0  - IVFs stopped, recurrent PAPA on 6/12 with FeNA indication pre-renal disease. 1L LR bolus ordere luís 3 hrs  - renal function improved on 06/13, continuing IV fluids  - avoid nephrotoxic agents as appropriate  - continue to monitor      Hyperlipidemia associated with type 2 diabetes mellitus  Given rise in liver enzymes, discontinuing atorvastatin    Hypertension associated with diabetes  - continue metoprolol  - holding home losartan for PAPA  - holding amlodipine given normotension and concern normotension may be causing PAPA  - discontinued HCTZ altogether given concerns of recurrent pancreatitis  - hydralazine prn      VTE Risk Mitigation (From admission, onward)    None          Discharge Planning   REBECCA: 6/15/2023     Code Status: Full Code   Is the patient medically ready for discharge?: No    Reason for patient still in hospital (select all that apply): Patient trending condition  Discharge Plan A: Home with family   Discharge Delays: None known at this time              Gregory Landers MD  Department of Hospital Medicine   [facility] - [facility]

## 2023-06-13 NOTE — ASSESSMENT & PLAN NOTE
Creatinine 1.7 on presentation ; baseline 1.0  - IVFs stopped, recurrent PAPA on 6/12 with FeNA indication pre-renal disease. 1L LR bolus ordere luís 3 hrs  - renal function improved on 06/13, continuing IV fluids  - avoid nephrotoxic agents as appropriate  - continue to monitor

## 2023-06-13 NOTE — ASSESSMENT & PLAN NOTE
IP HHS/DKA Pathway initiated. On presentation BG >700 and serum osmo 326 after 1L IVFs. B-hydroxybutyrate 1.3. pH 7.4 and bicarb 34. AG 12. Patient was started on insulin drip on admission but later in the day, patient transitioned to Levemir 14 units at night and aspart 3 units with meals.  Started on diabetic diet  - patient currently on Levemir 20 units nightly, 10 units aspart with meals  - further insulin titration per endocrinology  - POC AC/HS

## 2023-06-13 NOTE — PLAN OF CARE
Problem: Adult Inpatient Plan of Care  Goal: Plan of Care Review  Outcome: Ongoing, Progressing  Goal: Patient-Specific Goal (Individualized)  Outcome: Ongoing, Progressing  Goal: Absence of Hospital-Acquired Illness or Injury  Outcome: Ongoing, Progressing  Goal: Optimal Comfort and Wellbeing  Outcome: Ongoing, Progressing  Goal: Readiness for Transition of Care  Outcome: Ongoing, Progressing     Problem: Diabetic Ketoacidosis  Goal: Fluid and Electrolyte Balance with Absence of Ketosis  Outcome: Ongoing, Progressing     Problem: Diabetes Comorbidity  Goal: Blood Glucose Level Within Targeted Range  Outcome: Ongoing, Progressing     Problem: Fluid and Electrolyte Imbalance (Acute Kidney Injury/Impairment)  Goal: Fluid and Electrolyte Balance  Outcome: Ongoing, Progressing     Problem: Oral Intake Inadequate (Acute Kidney Injury/Impairment)  Goal: Optimal Nutrition Intake  Outcome: Ongoing, Progressing     Problem: Renal Function Impairment (Acute Kidney Injury/Impairment)  Goal: Effective Renal Function  Outcome: Ongoing, Progressing     Problem: Adjustment to Illness (Sepsis/Septic Shock)  Goal: Optimal Coping  Outcome: Ongoing, Progressing     Problem: Bleeding (Sepsis/Septic Shock)  Goal: Absence of Bleeding  Outcome: Ongoing, Progressing     Problem: Glycemic Control Impaired (Sepsis/Septic Shock)  Goal: Blood Glucose Level Within Desired Range  Outcome: Ongoing, Progressing     Problem: Infection Progression (Sepsis/Septic Shock)  Goal: Absence of Infection Signs and Symptoms  Outcome: Ongoing, Progressing     Problem: Nutrition Impaired (Sepsis/Septic Shock)  Goal: Optimal Nutrition Intake  Outcome: Ongoing, Progressing  Pt. Laying in right side lying position with eyes closed resp even and unlabored no distress noted at this time safety precautions maintained.

## 2023-06-13 NOTE — PROGRESS NOTES
"[facility] - Telemetry Stepdown  Endocrinology  Progress Note    Admit Date: 2023     63 year old  female with type 2 diabetes mellitus, CKD, CAD, hypertension, hyperlipidemia, MURTAZA (not on CPAP), PAD, Afib and hx of DVT on Eliquis and recent admission for pancreatitis who presents with HHS and developing possible biliary obstruction.     - Patient recently admitted  to  for acute pancreatitis attributed to Trulicity, which was discontinued upon d/c. Per pt's PCP this was 2nd episode of pancreatitis while on trulicity -- prior episode when increasing dose of Trulicity.   Pt states upon d/c  she was feeling slight improvement but still having intermittent n/v and not tolerating normal diet. N/V began to get worse in last few days which prompted admission. No abdominal pain on presentation for current admission, unlike last admit when she had severe abd pain for pancreatitis.    In the ED patient afebrile and hemodynamically stable saturating well on room air. Lipase >2000. BG >700 and serum osmo 326 after 1L IVFs. B-hydroxybutyrate 1.3. pH 7.4 and bicarb 34. AG 12. Patient started on aggressive IVFs and insulin gtt for HHS.     - Endocrinology consulted for initial HHS and glucose management      Regarding Type 2 Diabetes Mellitus:    - Initially diagnosed with Type 2 diabetes mellitus: pt not sure - states "long time ago"; at least as far back as  per review of elevated a1c in epic  - Home diabetic medications include: Farxiga 10mg qd, Glipizide ER 10mg daily, Levemir 20u qhs (but never started). She has been on MDI previously, most recently she was on Toujeo and Humalog discontinued 2021 but she states she was on insulin up until about 10mo ago.  - Family History: Not asked  - Weight based dosin kg x 0.4 (CKD) = 25 TDD x 0.5 = 12 basal / 12 prandial  - 1700/TDD = 68 (estimated insulin sensitivity factor)  - 450/TDD = 18 (estimated starting carb ratio for prandial " "dosing)    Lab Results   Component Value Date    HGBA1C 8.1 (H) 05/25/2023    HGBA1C 8.2 (H) 04/05/2023    HGBA1C 8.8 (H) 03/29/2023    CPEPTIDE 1.36 06/12/2017     Lab Results   Component Value Date    EGFRNORACEVR 51.2 (A) 06/13/2023    EGFRNORACEVR 46.5 (A) 06/12/2023    EGFRNORACEVR 36.2 (A) 06/12/2023       Interval HPI:   Overnight events: Patient with worsening jaundice/LFTs prompting evaluation  Eating:   <25%  Nausea: No  Hypoglycemia and intervention: No  Fever: No  TPN and/or TF: No  If yes, type of TF/TPN and rate: NA    BP (!) 144/65 (BP Location: Right arm, Patient Position: Lying)   Pulse 96   Temp 97.6 °F (36.4 °C) (Oral)   Resp 17   Ht 5' 4" (1.626 m)   Wt 62.8 kg (138 lb 7.2 oz)   LMP  (LMP Unknown)   SpO2 98%   BMI 23.76 kg/m²     Labs Reviewed and Include    Recent Labs   Lab 06/13/23  0412   *   CALCIUM 9.5   ALBUMIN 2.4*   PROT 7.0      K 3.6   CO2 24   CL 98   BUN 23   CREATININE 1.2   ALKPHOS 1,010*   *   *   BILITOT 6.2*     Lab Results   Component Value Date    WBC 19.85 (H) 06/13/2023    HGB 11.9 (L) 06/13/2023    HCT 37.5 06/13/2023    MCV 85 06/13/2023     06/13/2023     No results for input(s): TSH, FREET4 in the last 168 hours.  Lab Results   Component Value Date    HGBA1C 8.1 (H) 05/25/2023       Nutritional status:   Body mass index is 23.76 kg/m².  Lab Results   Component Value Date    ALBUMIN 2.4 (L) 06/13/2023    ALBUMIN 2.6 (L) 06/12/2023    ALBUMIN 2.6 (L) 06/12/2023     No results found for: PREALBUMIN    Estimated Creatinine Clearance: 42 mL/min (based on SCr of 1.2 mg/dL).    Accu-Checks  Recent Labs     06/10/23  2345 06/11/23  0737 06/11/23  1156 06/11/23  1646 06/11/23  2134 06/12/23  0724 06/12/23  1229 06/12/23  1647 06/12/23  2117 06/13/23  0742   POCTGLUCOSE 207* 262* 359* 342* 253* 293* 303* 212* 156* 164*       Current Medications and/or Treatments Impacting Glycemic Control  Immunotherapy:    Immunosuppressants       None "          Steroids:   Hormones (From admission, onward)      Start     Stop Route Frequency Ordered    06/09/23 2028  melatonin tablet 6 mg         -- Oral Nightly PRN 06/09/23 1931          Pressors:    Autonomic Drugs (From admission, onward)      None          Hyperglycemia/Diabetes Medications:   Antihyperglycemics (From admission, onward)      Start     Stop Route Frequency Ordered    06/12/23 2100  insulin detemir U-100 (Levemir) pen 20 Units         -- SubQ Nightly 06/12/23 0904    06/12/23 1645  insulin aspart U-100 pen 10 Units         -- SubQ 3 times daily with meals 06/12/23 1334    06/10/23 1315  insulin regular in 0.9 % NaCl 100 unit/100 mL (1 unit/mL) infusion        Question:  Enter initial dose (Units/hr):  Answer:  0.6    06/10 2200 IV Continuous 06/10/23 1211    06/10/23 1309  insulin aspart U-100 pen 0-5 Units         -- SubQ As needed (PRN) 06/10/23 1211            ASSESSMENT and PLAN    Cardiac/Vascular  Hyperlipidemia associated with type 2 diabetes mellitus  - On Lipitor 40mg and Repatha outpatient  - Management per primary    Renal/  PAPA (acute kidney injury)  - Improved/resolved. Likely 2/2 IVVD 2/2 HHS on admission; baseline Cr appears to be around 1.1-1.2  - Management per primary    Endocrine  * Type 2 diabetes mellitus with hyperosmolar hyperglycemic state (HHS)  - HHS on admission. Recent pancreatitis admission, not yet back to normal po intake with worsening hepatic function. Taking Glipizide and Farxiga at home. Never picked up Levemir Rx recently prescribed by her PC  - 24 hour glucose trend: Better control in the low 200s to mid 100s    - Continue Levemir 20 units at night  - Continue aspart 10 units TIDAC+LDC    GI  History of pancreatitis  - She has now had two episodes of pancreatitis on Trulicity, however suspicion for choledocholithiasis as cause for pancreatitis. GI evaluating for worsening hepatic function  - [facility]      Mario Alberto Carroll  DO [facility], [address]  [address] [place], [place], [address]    Office: [phone]  Fax: [phone]    Disclaimer: This note has been generated using voice-recognition software. There may be typographical errors that have been missed during proof-reading.    The above history labs imaging impression and plan were discussed with attending physician who is in agreement and also took part in this patient's care.  I personally reviewed all of the patients available medications, labs, imaging, vitals, allergies, medical history.

## 2023-06-13 NOTE — ASSESSMENT & PLAN NOTE
This patient does have evidence of infective focus  My overall impression is sepsis.  Source: Abdominal and potentially bacteremia  Antibiotics given-   Antibiotics (72h ago, onward)    Start     Stop Route Frequency Ordered    06/13/23 1400  metronidazole IVPB 500 mg  (Sepsis Treatment Panel)         06/19 1359 IV Every 8 hours 06/13/23 0705    06/13/23 0815  cefTRIAXone (ROCEPHIN) 2 g in dextrose 5 % in water (D5W) 5 % 100 mL IVPB (MB+)  (Sepsis Treatment Panel)         06/19 0814 IV Every 24 hours (non-standard times) 06/13/23 0705        Latest lactate reviewed-  No results for input(s): LACTATE in the last 72 hours.  Organ dysfunction indicated by Acute liver injury    Fluid challenge Not needed - patient is not hypotensive      Post- resuscitation assessment No - Post resuscitation assessment not needed       Will Not start Pressors- Levophed for MAP of 65  Source control achieved by: Ceftriaxone/metronidazole

## 2023-06-13 NOTE — CARE UPDATE
RAPID RESPONSE NURSE ROUND       Rounding completed with charge RNReji for abnormal labs reports pt stable at this time, will follow up with lab work. No additional concerns verbalized at this time. Instructed to call [phone] for further concerns or assistance.

## 2023-06-13 NOTE — PLAN OF CARE
Problem: Adult Inpatient Plan of Care  Goal: Plan of Care Review  Outcome: Ongoing, Progressing  Goal: Patient-Specific Goal (Individualized)  Outcome: Ongoing, Progressing  Goal: Absence of Hospital-Acquired Illness or Injury  Outcome: Ongoing, Progressing  Goal: Optimal Comfort and Wellbeing  Outcome: Ongoing, Progressing  Goal: Readiness for Transition of Care  Outcome: Ongoing, Progressing     Problem: Diabetic Ketoacidosis  Goal: Fluid and Electrolyte Balance with Absence of Ketosis  Outcome: Ongoing, Progressing     Problem: Diabetes Comorbidity  Goal: Blood Glucose Level Within Targeted Range  Outcome: Ongoing, Progressing     Problem: Fluid and Electrolyte Imbalance (Acute Kidney Injury/Impairment)  Goal: Fluid and Electrolyte Balance  Outcome: Ongoing, Progressing     Problem: Oral Intake Inadequate (Acute Kidney Injury/Impairment)  Goal: Optimal Nutrition Intake  Outcome: Ongoing, Progressing     Problem: Renal Function Impairment (Acute Kidney Injury/Impairment)  Goal: Effective Renal Function  Outcome: Ongoing, Progressing  POC reviewed with patient. Verbalizes understanding denies questions or needs at this time. Good hours noted. Safety barriers remain intact. Verbalizes to call prn needs or questions.

## 2023-06-13 NOTE — ASSESSMENT & PLAN NOTE
- She has now had two episodes of pancreatitis on Trulicity, however suspicion for choledocholithiasis as cause for pancreatitis. GI evaluating for worsening hepatic function  - Hold Trulicity at discharge

## 2023-06-13 NOTE — NURSING
Pt. Has not eaten all day her insulin was held due to not eating or having an appetite md is aware last blood glucose 162

## 2023-06-13 NOTE — ANESTHESIA PREPROCEDURE EVALUATION
[facility]  Anesthesia Pre-Operative Evaluation         Patient Name: Mariann Huff  YOB: 1960  MRN: [id number]    SUBJECTIVE:     Pre-operative evaluation for Procedure(s) (LRB):  ULTRASOUND, UPPER GI TRACT, ENDOSCOPIC (N/A)     06/13/2023    Mariann Huff is a 63 y.o. female w/ a significant PMHx of CAD with GINGER , HTN, HLD, DM2, MURTAZA (not on CPAP), PAD, Afib and hx of DVT on Eliquis presented with hyperosmolar hyperglycemic state. Worsening liver enzymes and given history of pancreatitis concern for possible choledocholithiasis.     Patient now presents for the above procedure(s).      LDA:        Peripheral IV - Single Lumen 06/12/23 1645 20 G;1 3/4 in Anterior;Left;Proximal Upper Arm (Active)   Site Assessment Clean;Dry;Intact;No redness;No swelling 06/13/23 1108   Extremity Assessment Distal to IV No warmth;No swelling;No redness;No abnormal discoloration 06/12/23 2000   Line Status Infusing 06/13/23 1108   Dressing Status Clean;Dry;Intact 06/13/23 1108   Dressing Intervention Integrity maintained 06/13/23 1108   Dressing Change Due 06/16/23 06/12/23 1645   Site Change Due 06/16/23 06/12/23 1645   Reason Not Rotated Not due 06/12/23 2000   Number of days: 0       Prev airway:   Intubation:     Induction:  Intravenous    Intubated:  Postinduction    Mask Ventilation:  Easy with oral airway    Attempts:  3    Attempted By:  Resident anesthesiologist    Method of Intubation:  Direct    Blade:  Eufemia 3    Laryngeal View Grade: Grade IIb - only the arytenoids and epiglottis seen      Attempted By (2nd Attempt):  Resident anesthesiologist    Method of Intubation (2nd Attempt):  Direct    Blade (2nd Attempt):  Shahid 2    Laryngeal View Grade (2nd Attempt): Grade IIb - only the arytenoids and epiglottis seen      Attempted By (3rd Attempt):  Staff anesthesiologist    Method of Intubation (3rd Attempt):  Direct    Blade (3rd Attempt):  Shahid 2    Laryngeal View Grade (3rd  Attempt): Grade IIa - cords partially seen      Difficult Airway Encountered?: No      Complications:  None    Airway Device:  Oral endotracheal tube    Airway Device Size:  7.0    Style/Cuff Inflation:  Cuffed    Inflation Amount (mL):  6    Tube secured:  23    Secured at:  The lips    Placement Verified By:  Capnometry    Complicating Factors:  Anterior larynx, small mouth and short neck    Findings Post-Intubation:  BS equal bilateral       Drips:    lactated ringers 100 mL/hr at 06/13/23 1016       Patient Active Problem List   Diagnosis    Hypertension associated with diabetes    Hyperlipidemia associated with type 2 diabetes mellitus    CAD (coronary artery disease)    Sleep apnea    Carotid stenosis, bilateral    History of non-ST elevation myocardial infarction (NSTEMI)    PAPA (acute kidney injury)    S/P coronary artery stent placement    Nuclear sclerosis - Right Eye    Primary localized osteoarthrosis, lower leg    Chronic sinusitis    PSC (posterior subcapsular cataract), right    DME (diabetic macular edema)    Refractive error    Pseudophakia    Type 2 diabetes mellitus with diabetic peripheral angiopathy without gangrene    Diabetic peripheral neuropathy associated with type 2 diabetes mellitus    Cervical arthritis    Neurogenic claudication    Lumbar herniated disc    Fatty liver disease, nonalcoholic    Arthritis of lumbar spine    Chronic pain    Fusion of spine, lumbar region    Lumbosacral radiculopathy at L5    Sepsis with acute liver failure without hepatic coma or septic shock    Type 2 diabetes mellitus with stage 2 chronic kidney disease and hypertension    Asthma    Bradycardia    Delayed surgical wound healing    Rectal ulcer    Palliative care encounter    Acute deep vein thrombosis (DVT) of femoral vein of right lower extremity    Impaired functional mobility and endurance    (HFpEF) heart failure with preserved ejection fraction    Alteration in  skin integrity    Debility    Left ureteral injury    Hydronephrosis    Serum albumin decreased    Weakness of both lower extremities    Poor balance    Type 2 diabetes mellitus with retinopathy, with long-term current use of insulin    Hypertensive retinopathy, bilateral    SI (sacroiliac) joint dysfunction    S/P ureteral reimplantation    Left flank pain    Complex renal cyst    Claudication of both lower extremities    PAD (peripheral artery disease)    Compression fracture of spine    Nontraumatic compression fracture of T12 vertebra    Bilateral renal cysts    Chronic kidney disease, stage 3a    Acute recurrent pancreatitis    Type 2 diabetes mellitus with hyperosmolar hyperglycemic state (HHS)    History of DVT (deep vein thrombosis)    History of pancreatitis    Hyperbilirubinemia       Review of patient's allergies indicates:   Allergen Reactions    Milk containing products (dairy)      Causes GI distress       Current Inpatient Medications:   cefTRIAXone (ROCEPHIN) IVPB  2 g Intravenous Q24H    cilostazoL  100 mg Oral BID    EScitalopram oxalate  10 mg Oral Daily    famotidine  20 mg Oral Daily    gabapentin  300 mg Oral Daily    gabapentin  600 mg Oral QHS    insulin aspart U-100  10 Units Subcutaneous TIDWM    insulin detemir U-100  20 Units Subcutaneous QHS    isosorbide mononitrate  20 mg Oral Daily    metoprolol tartrate  50 mg Oral BID    metronidazole  500 mg Intravenous Q8H    traZODone  50 mg Oral QHS       Current Facility-Administered Medications on File Prior to Encounter   Medication Dose Route Frequency Provider Last Rate Last Admin    0.9%  NaCl infusion   Intravenous Continuous Aime George  mL/hr at 10/27/22 0842 New Bag at 10/27/22 0842     Current Outpatient Medications on File Prior to Encounter   Medication Sig Dispense Refill    amLODIPine (NORVASC) 10 MG tablet Take 1 tablet (10 mg total) by mouth once daily. 90 tablet 2    aspirin 81  "mg Tab Take 81 mg by mouth once daily. 1 Tablet Oral Every day      atorvastatin (LIPITOR) 40 MG tablet Take 1 tablet (40 mg total) by mouth every evening. 90 tablet 3    dapagliflozin (FARXIGA) 10 mg tablet Take 1 tablet (10 mg total) by mouth once daily. 90 tablet 3    diclofenac sodium (VOLTAREN) 1 % Gel Apply 2 g topically 3 (three) times daily. Apply to the area of pain 2-3x per day or night as needed 100 g 3    EScitalopram oxalate (LEXAPRO) 10 MG tablet Take 1 tablet (10 mg total) by mouth once daily. 90 tablet 2    evolocumab (REPATHA SURECLICK) 140 mg/mL PnIj Inject 1 mL (140 mg total) into the skin every 14 (fourteen) days. 2 mL 6    gabapentin (NEURONTIN) 300 MG capsule Take 1 capsule (300 mg) in the morning and 2 capsules (600 mg) in the evening 90 capsule 0    glipiZIDE (GLUCOTROL) 10 MG TR24 Take 1 tablet (10 mg total) by mouth daily with breakfast. 90 tablet 3    isosorbide mononitrate (ISMO,MONOKET) 10 mg tablet Take 1 tablet (10 mg total) by mouth once daily. 90 tablet 2    telmisartan (MICARDIS) 80 MG Tab Take 1 tablet (80 mg total) by mouth once daily. Stop losartan 90 tablet 2    ACCU-CHEK EDIN PLUS METER Misc       albuterol (PROVENTIL/VENTOLIN HFA) 90 mcg/actuation inhaler Inhale 2 puffs into the lungs every 6 (six) hours as needed for Wheezing. 1 g 3    blood sugar diagnostic (ACCU-CHEK EDIN PLUS TEST STRP) Strp TEST BLOOD SUGARS 4 TIMES DAILY 150 strip 11    cilostazoL (PLETAL) 100 MG Tab Take 1 tablet (100 mg total) by mouth 2 (two) times daily. (Patient not taking: Reported on 6/9/2023) 60 tablet 11    insulin detemir U-100, Levemir, (LEVEMIR FLEXPEN) 100 unit/mL (3 mL) InPn pen Inject 20 Units into the skin every evening. 18 mL 3    multivitamin (THERAGRAN) per tablet Take 1 tablet by mouth once daily.      pen needle, diabetic (NOVOTWIST) 32 gauge x 1/5" Ndle Use 1 needle to inject insulin four times daily 400 each 2       Past Surgical History:   Procedure Laterality " Date    ANTEGRADE NEPHROSTOGRAPHY Left 2019    Procedure: Nephrostogram - antegrade;  Surgeon: Robin Boyd MD;  [facility];  Service: Urology;  Laterality: Left;    BRONCHOSCOPY N/A 2019    Procedure: BRONCHOSCOPY;  Surgeon: Sean Ruano MD;  [facility];  Service: General;  Laterality: N/A;    CARDIAC CATHETERIZATION      CATARACT EXTRACTION      cataract extraction left eye      cataracts      CAUDAL EPIDURAL STEROID INJECTION N/A 2019    Procedure: Injection-steroid-epidural-caudal;  Surgeon: Dave Bentley Jr., MD;  Location: [facility] PAIN MGT;  Service: Pain Management;  Laterality: N/A;     SECTION, LOW TRANSVERSE      COLONOSCOPY N/A 2019    Procedure: COLONOSCOPY;  Surgeon: Al Alaniz MD;  Location: [facility] ENDO (2ND FLR);  Service: Endoscopy;  Laterality: N/A;    CORONARY ANGIOPLASTY      CYSTOGRAM N/A 2019    Procedure: CYSTOGRAM INTRAOP;  Surgeon: Robin Boyd MD;  [facility];  Service: Urology;  Laterality: N/A;  1 HOUR    CYSTOSCOPY W/ RETROGRADES Left 2019    Procedure: CYSTOSCOPY, WITH RETROGRADE PYELOGRAM;  Surgeon: Robin Boyd MD;  [facility];  Service: Urology;  Laterality: Left;  fluro    ESOPHAGOGASTRODUODENOSCOPY W/ PEG  2019    Procedure: EGD, WITH PEG TUBE INSERTION;  Surgeon: Sean Ruano MD;  [facility];  Service: General;;    EXCISION TURBINATE, SUBMUCOUS      FLEXIBLE SIGMOIDOSCOPY N/A 2019    Procedure: SIGMOIDOSCOPY, FLEXIBLE;  Surgeon: ALBERTO Amin MD;  Location: [facility] ENDO (2ND FLR);  Service: Endoscopy;  Laterality: N/A;    FLEXIBLE SIGMOIDOSCOPY N/A 2019    Procedure: SIGMOIDOSCOPY, FLEXIBLE;  Surgeon: ALBERTO Amin MD;  Location: [facility] ENDO (2ND FLR);  Service: Endoscopy;  Laterality: N/A;    FUSION OF LUMBAR SPINE BY ANTERIOR APPROACH Left 2019    Procedure: FUSION, SPINE, LUMBAR, ANTERIOR APPROACH Left L5-S1  Anterior to Psoa Interbody Fusion, L5-S1 Posterior Instrumentation;  Surgeon: Mk George MD;  [address]  Service: Neurosurgery;  Laterality: Left;  Porcedure:  Left L5-S1 Anterior to Psoa Interbody Fusion, L5-S1 Posterior Instrumentation  Surgery Time: 4 Hrs  LOS: 2-3  Anesthesia: General   Blood: Type & Screen  R    HAND SURGERY Left     HAND SURGERY Right     torn ligament repair/ Dr. Yeboah    HYSTERECTOMY      INJECTION OF STEROID Right 12/10/2020    Procedure: INJECTION, STEROID Right SI Joint Block and Steroid Injection;  Surgeon: Mk George MD;  Location: [facility];  Service: Neurosurgery;  Laterality: Right;  Procedure: Right SI Joint Block and Steroid Injection   SUrgery Time: 30 Min  LOS: 0  Anesthesia: MAC  Radiology: C-arm  Bed: [place] 4 Poster  Position: Prone    INJECTION OF STEROID Right 9/28/2021    Procedure: INJECTION, STEROID Right SI joint block & steroid injection;  Surgeon: Mk George MD;  Location: [facility];  Service: Neurosurgery;  Laterality: Right;  Procedure: Right SI joint block & steroid injection  Surgery Time: 30m  Anesthesia: Local MAC  Radiology: C-arm  Bed: Regular  Position: Prone    left foot surgery      left wrist surgery      LYSIS OF ADHESIONS N/A 2/19/2020    Procedure: LYSIS, ADHESIONS;  Surgeon: Robin Boyd MD;  Location: [address];  Service: Urology;  Laterality: N/A;    NASAL SEPTUM SURGERY  5/7/15    PERCUTANEOUS NEPHROSTOMY Left 4/21/2019    Procedure: Creation, Nephrostomy, Percutaneous;  Surgeon: Karina Surgeon;  [facility];  Service: Anesthesiology;  Laterality: Left;    REPAIR OF URETER  4/12/2019    Procedure: REPAIR, URETER;  Surgeon: Mk George MD;  Location: [address];  Service: Neurosurgery;;    REPLACEMENT OF NEPHROSTOMY TUBE N/A 7/18/2019    Procedure: REPLACEMENT, NEPHROSTOMY TUBE;  Surgeon: [facility] Diagnostic Provider;  Location: [address];  Service: Anesthesiology;  Laterality:  N/A;  188    REPLACEMENT OF NEPHROSTOMY TUBE N/A 7/24/2019    Procedure: REPLACEMENT, NEPHROSTOMY TUBE;  Surgeon: [facility] Diagnostic Provider;  [facility]: [facility] OR 2ND FLR;  Service: Anesthesiology;  Laterality: N/A;  188    REPLACEMENT OF NEPHROSTOMY TUBE N/A 10/7/2019    Procedure: REPLACEMENT, NEPHROSTOMY TUBE;  Surgeon: [facility] Diagnostic Provider;  Location: [facility] OR 2ND FLR;  Service: Anesthesiology;  Laterality: N/A;  189    REPLACEMENT OF NEPHROSTOMY TUBE N/A 11/25/2019    Procedure: REPLACEMENT, NEPHROSTOMY TUBE;  Surgeon: [facility] Diagnostic Provider;  [facility];  Service: Anesthesiology;  Laterality: N/A;  Room 188/Bessy    REPLACEMENT OF NEPHROSTOMY TUBE Right 2/19/2020    Procedure: REPLACEMENT, NEPHROSTOMY TUBE removal removal;  Surgeon: Robin Boyd MD;  [facility];  Service: Urology;  Laterality: Right;    rt elbow surgery      S/P LAD COATED STENT  05/14/2010    6 total     S/P OM1 STENT  08/2003    SINUS SURGERY      F.E.S.S.    TRACHEOSTOMY N/A 5/2/2019    Procedure: CREATION, TRACHEOSTOMY;  Surgeon: Sean Ruano MD;  [facility];  Service: General;  Laterality: N/A;    TUBAL LIGATION      URETERAL REIMPLANTION Left 2/19/2020    Procedure: REIMPLANTATION, URETER WITH PSOAS HITCH;  Surgeon: Robin Boyd MD;  [facility];  Service: Urology;  Laterality: Left;       Social History     Socioeconomic History    Marital status:      Spouse name: Shamir    Number of children: 2   Occupational History    Occupation: cafeteria     Employer: [employer]     Employer: [employer]     Employer: [employer]   Tobacco Use    Smoking status: Never    Smokeless tobacco: Never   Substance and Sexual Activity    Alcohol use: No     Alcohol/week: 0.0 standard drinks    Drug use: No    Sexual activity: Yes     Partners: Male     Birth control/protection: Post-menopausal     Comment:    Other  Topics Concern    Are you pregnant or think you may be? No    Breast-feeding No   Social History Narrative    . 2 children.      Social Determinants of Health     Financial Resource Strain: Low Risk     Difficulty of Paying Living Expenses: Not hard at all   Food Insecurity: No Food Insecurity    Worried About Running Out of Food in the Last Year: Never true    Ran Out of Food in the Last Year: Never true   Transportation Needs: No Transportation Needs    Lack of Transportation (Medical): No    Lack of Transportation (Non-Medical): No   Physical Activity: Sufficiently Active    Days of Exercise per Week: 4 days    Minutes of Exercise per Session: 60 min   Stress: Stress Concern Present    Feeling of Stress : To some extent   Social Connections: Moderately Isolated    Frequency of Communication with Friends and Family: Once a week    Frequency of Social Gatherings with Friends and Family: Once a week    Attends Jehovah's witness Services: More than 4 times per year    Active Member of Clubs or Organizations: No    Attends Club or Organization Meetings: Never    Marital Status:    Housing Stability: Low Risk     Unable to Pay for Housing in the Last Year: No    Number of Places Lived in the Last Year: 1    Unstable Housing in the Last Year: No       OBJECTIVE:     Vital Signs Range (Last 24H):  Temp:  [36.4 °C (97.6 °F)-36.7 °C (98.1 °F)]   Pulse:  []   Resp:  [16-27]   BP: (102-186)/(55-85)   SpO2:  [95 %-100 %]       Significant Labs:  Lab Results   Component Value Date    WBC 19.85 (H) 06/13/2023    HGB 11.9 (L) 06/13/2023    HCT 37.5 06/13/2023     06/13/2023    CHOL 131 05/25/2023    TRIG 65 06/13/2023    HDL 45 05/25/2023     (H) 06/13/2023     (H) 06/13/2023     06/13/2023    K 3.6 06/13/2023    CL 98 06/13/2023    CREATININE 1.2 06/13/2023    BUN 23 06/13/2023    CO2 24 06/13/2023    TSH 1.230 10/25/2022    INR 1.2 06/13/2023    GLUF 217 (H) 09/15/2014     HGBA1C 8.1 (H) 05/25/2023       Diagnostic Studies: No relevant studies.    EKG:   Results for orders placed or performed during the hospital encounter of 06/09/23   EKG 12-lead    Collection Time: 06/12/23  8:18 AM    Narrative    Test Reason : E87.5,    Vent. Rate : 077 BPM     Atrial Rate : 077 BPM     P-R Int : 164 ms          QRS Dur : 064 ms      QT Int : 392 ms       P-R-T Axes : 043 -28 -11 degrees     QTc Int : 443 ms    Normal sinus rhythm  Nonspecific ST-T abn  Borderline Abnormal ECG  When compared with ECG of 23-AUG-2022 09:43,  Criteria for Septal infarct are no longer Present  T wave inversion now evident in Inferior leads  Nonspecific T wave abnormality, improved in Lateral leads  Confirmed by Peter BROWN MD ([id number]) on 6/12/2023 9:08:44 AM    Referred By: [email or web address]   SELF           Confirmed By:Peter BROWN MD       2D ECHO:  TTE:  Results for orders placed or performed during the hospital encounter of 05/25/23   Echo   Result Value Ref Range    BSA 1.79 m2    TDI SEPTAL 0.06 m/s    LV LATERAL E/E' RATIO 9.43 m/s    LV SEPTAL E/E' RATIO 11.00 m/s    LA WIDTH 3.64 cm    TDI LATERAL 0.07 m/s    LVIDd 5.17 3.5 - 6.0 cm    IVS 0.89 0.6 - 1.1 cm    Posterior Wall 0.86 0.6 - 1.1 cm    LVIDs 3.64 2.1 - 4.0 cm    FS 30 28 - 44 %    LA volume 56.82 cm3    Sinus 2.80 cm    STJ 2.38 cm    Ascending aorta 2.90 cm    LV mass 161.33 g    LA size 3.85 cm    RVDD 2.87 cm    TAPSE 1.61 cm    Left Ventricle Relative Wall Thickness 0.33 cm    AV mean gradient 3 mmHg    AV valve area 2.40 cm2    AV Velocity Ratio 0.82     AV index (prosthetic) 0.75     MV valve area p 1/2 method 4.65 cm2    E/A ratio 0.89     Mean e' 0.07 m/s    E wave deceleration time 163.28 msec    LVOT diameter 2.02 cm    LVOT area 3.2 cm2    LVOT peak canelo 0.91 m/s    LVOT peak VTI 18.56 cm    Ao peak canelo 1.11 m/s    Ao VTI 24.73 cm    LVOT stroke volume 59.45 cm3    AV peak gradient 5 mmHg    E/E' ratio 10.15 m/s    MV Peak E Canelo 0.66 m/s    MV  stenosis pressure 1/2 time 47.35 ms    MV Peak A Canelo 0.74 m/s    LV Systolic Volume 56.03 mL    LV Systolic Volume Index 31.8 mL/m2    LV Diastolic Volume 128.05 mL    LV Diastolic Volume Index 72.76 mL/m2    LA Volume Index 32.3 mL/m2    LV Mass Index 92 g/m2    RA Major Axis 3.89 cm    Left Atrium Minor Axis 4.79 cm    Left Atrium Major Axis 4.75 cm    LA Volume Index (Mod) 26.3 mL/m2    LA volume (mod) 46.30 cm3    RA Width 2.66 cm    Right Atrial Pressure (from IVC) 3 mmHg    QEF 52 %    EF 55 %    Narrative    · The estimated ejection fraction is 55%.  · The quantitatively derived ejection fraction is 52%.  · Normal right ventricular size with normal right ventricular systolic   function.  · Normal left ventricular diastolic function.  · The left ventricle is normal in size with normal systolic function.  · Normal central venous pressure (3 mmHg).          LUNA:  No results found. However, due to the size of the patient record, not all encounters were searched. Please check Results Review for a complete set of results.    ASSESSMENT/PLAN:                                                                                                                  06/13/2023  Mariann Huff is a 63 y.o., female.      Pre-op Assessment    I have reviewed the Patient Summary Reports.     I have reviewed the Nursing Notes. I have reviewed the NPO Status.   I have reviewed the Medications.     Review of Systems  Anesthesia Hx:  No problems with previous Anesthesia  History of prior surgery of interest to airway management or planning: Denies Family Hx of Anesthesia complications.    Social:  Non-Smoker, No Alcohol Use    Hematology/Oncology:  Hematology Normal        Cardiovascular:   Hypertension Past MI (2013) CAD  CABG/stent (GINGER x2 2013)  CHF  Denies Orthopnea. ECG has been reviewed.    Pulmonary:   Asthma mild Sleep Apnea    Renal/:   Chronic Renal Disease    Hepatic/GI:   Liver Disease, (NAFLD)    Musculoskeletal:    Arthritis   Spine Disorders:    Neurological:   Peripheral Neuropathy    Endocrine:   Diabetes, well controlled, type 2        Physical Exam  General: Well nourished, Cooperative, Alert and Oriented    Airway:  Mallampati: III   Mouth Opening: Normal  TM Distance: Normal  Tongue: Normal  Neck ROM: Normal ROM    Dental:  Periodontal disease        Anesthesia Plan  Type of Anesthesia, risks & benefits discussed:    Anesthesia Type: Gen ETT, Gen Natural Airway  Intra-op Monitoring Plan: Standard ASA Monitors  Post Op Pain Control Plan: multimodal analgesia  Induction:  IV  Airway Plan: Direct, Post-Induction  Informed Consent: Informed consent signed with the Patient and all parties understand the risks and agree with anesthesia plan.  All questions answered.   ASA Score: 3  Day of Surgery Review of History & Physical: H&P Update referred to the surgeon/provider.    Ready For Surgery From Anesthesia Perspective.     .

## 2023-06-13 NOTE — ASSESSMENT & PLAN NOTE
- HHS on admission. Recent pancreatitis admission, not yet back to normal po intake with worsening hepatic function. Taking Glipizide and Farxiga at home. Never picked up Levemir Rx recently prescribed by her PC  - 24 hour glucose trend: Better control in the low 200s to mid 100s    - Continue Levemir 20 units at night  - Continue aspart 10 units TIDAC+LDC

## 2023-06-13 NOTE — CARE UPDATE
RAPID RESPONSE NURSE PROACTIVE ROUNDING NOTE       Time of Visit: 0610    Admit Date: 2023  LOS: 4  Code Status: Full Code   Date of Visit: 2023  : 1960  Age: 63 y.o.  Sex: female  Race: Black or   Bed: 8097/8097 A:   MRN: [id number]  Was the patient discharged from an ICU this admission? No   Was the patient discharged from a PACU within last 24 hours? No   Did the patient receive conscious sedation/general anesthesia in last 24 hours? No  Was the patient in the ED within the past 24 hours? No  Was the patient on NIPPV within the past 24 hours? No   Attending Physician: Gregory Landers MD  Primary Service: [facility]   Time spent at the bedside: 15 -30 min    SITUATION    Notified by [employer] | [employer] patient alert.  Reason for alert: Elevated Liver function , WBC  Called to evaluate the patient for Circulatory    BACKGROUND     Why is the patient in the hospital?: Type 2 diabetes mellitus with hyperosmolar hyperglycemic state (HHS)    Patient has a past medical history of Anticoagulant long-term use, Asthma, Back pain, Bradycardia, unspecified, CAD (coronary artery disease), Carotid artery stenosis, Chronic combined systolic and diastolic CHF (congestive heart failure), Diabetes mellitus type 2 in obese, HTN (hypertension), benign, Hyperlipidemia, Keloid cicatrix, NSTEMI (non-ST elevated myocardial infarction), Nuclear sclerosis - Right Eye, Primary localized osteoarthrosis, lower leg, Senile nuclear sclerosis, Sleep apnea, and 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    Last Vitals:  Temp: 97.6 °F (36.4 °C) (735)  Pulse: 96 (735)  Resp: 17 (735)  BP: 144/65 (735)  SpO2: 98 % (735)    24 Hours Vitals Range:  Temp:  [97.6 °F (36.4 °C)-98.1 °F (36.7 °C)]   Pulse:  []   Resp:  [16-27]   BP: (102-186)/(55-85)   SpO2:  [95 %-100 %]      Labs:  Recent Labs     06/11/23  0405 06/12/23  0338 06/13/23  0412   WBC 8.48 9.49 19.85*   HGB 10.7* 11.6* 11.9*   HCT 36.8* 35.5* 37.5    278 282       Recent Labs     06/12/23  0338 06/12/23  0758 06/12/23  1351 06/13/23  0412   * 136 135* 140   K 5.9* 4.2 4.0 3.6   CL 98 98 97 98   CO2 18* 26 26 24   CREATININE SEE COMMENT 1.6* 1.3 1.2   * 295* 272* 151*   PHOS 3.2  --  3.3 4.0   MG SEE COMMENT  --  2.4 2.3        No results for input(s): PH, PCO2, PO2, HCO3, POCSATURATED, BE in the last 72 hours.     ASSESSMENT    Physical Exam On arrival pt resting. Spouse at the bedside. Once pt was awake, I explained who I was and why I was there.  Increasing concerns for worsening liver failure, WBC 19.8 over night. Pt is hemodynamically stable, afebrile. She is jaundice. She is oriented to person place and time; however she is delayed and her  did all the talking for her. Dr. Heard was notified, prior to my arrival, of my concerns and additional labs and blood cultures were ordered.  The night shift RN, did express concern that she felt the patient was mildly disoriented at the starting of the shift, prior to her husbands arrival, but was oriented the rest of the shift. She placed the patient on the bedside monitor for closer monitoring. LR infusing at 100 ml/hr. She is making urine. 600 ml collected and 2 additional occurrences per the night shift.  Critical care may need to be consulted if liver function continues to worsen or if patient develops lactic acidosis or DIC.     INTERVENTIONS    The patient was seen for Medical problem. Staff concerns included critical lab abnormality. The following interventions were performed: lactic acid and Ammonia, Fibrinogen, INR, Triglycerides, Blood cultures X 2.    RECOMMENDATIONS    Blood cultures x 2, INR, Triglycerides, Ammonia, Fibrinogen,     PROVIDER ESCALATION    Yes/No  Yes    Orders received and case discussed with Dr. Heard .    Disposition:  Remain in room 8097.    FOLLOW-UP    Bedside RN, Reji Yo RN  updated on plan of care. Instructed to call the Rapid Response Nurse, Yvette Man RN at [phone] for additional questions or concerns.

## 2023-06-13 NOTE — SUBJECTIVE & OBJECTIVE
Interval History: No acute events overnight.  Patient this morning it.  Confused, does not remember the events of yesterday.  Lab work concerning for elevated white blood cell count and worsening liver enzymes and bilirubin.  Patient to have EUS/ERCP performed on 06/14.  Patient to be NPO after midnight.  Given elevated white blood cell count, concern for potential cholangitis, patient started on ceftriaxone and metronidazole to cover intra-abdominal sources.  Blood cultures X 2 obtained    Review of Systems   Constitutional:  Negative for chills and fever.   HENT:  Negative for congestion and sore throat.    Eyes:  Negative for photophobia and visual disturbance.   Respiratory:  Negative for cough and shortness of breath.    Cardiovascular:  Negative for chest pain and palpitations.   Gastrointestinal:  Negative for abdominal pain, constipation, diarrhea, nausea and vomiting.   Endocrine: Negative for cold intolerance and heat intolerance.   Genitourinary:  Negative for dysuria and hematuria.   Musculoskeletal:  Negative for arthralgias and myalgias.   Skin:  Negative for rash.   Allergic/Immunologic: Negative for environmental allergies and food allergies.   Neurological:  Negative for dizziness, seizures, syncope and headaches.   Hematological:  Negative for adenopathy. Does not bruise/bleed easily.   Psychiatric/Behavioral:  Negative for hallucinations and suicidal ideas.    Objective:     Vital Signs (Most Recent):  Temp: 97.6 °F (36.4 °C) (06/13/23 1200)  Pulse: 96 (06/13/23 1200)  Resp: 15 (06/13/23 1200)  BP: 128/62 (06/13/23 1200)  SpO2: 100 % (06/13/23 1200) Vital Signs (24h Range):  Temp:  [97.6 °F (36.4 °C)-98.1 °F (36.7 °C)] 97.6 °F (36.4 °C)  Pulse:  [] 96  Resp:  [15-27] 15  SpO2:  [97 %-100 %] 100 %  BP: (102-186)/(55-85) 128/62     Weight: 62.8 kg (138 lb 7.2 oz)  Body mass index is 23.76 kg/m².    Intake/Output Summary (Last 24 hours) at 6/13/2023 1412  Last data filed at 6/13/2023  1246  Gross per 24 hour   Intake 300 ml   Output 600 ml   Net -300 ml           Physical Exam  Constitutional:       Appearance: She is well-developed.   HENT:      Head: Normocephalic and atraumatic.   Eyes:      Conjunctiva/sclera: Conjunctivae normal.      Pupils: Pupils are equal, round, and reactive to light.   Neck:      Thyroid: No thyromegaly.      Vascular: No JVD.   Cardiovascular:      Rate and Rhythm: Normal rate and regular rhythm.      Heart sounds: Normal heart sounds. No murmur heard.    No friction rub. No gallop.   Pulmonary:      Effort: Pulmonary effort is normal.      Breath sounds: Normal breath sounds. No wheezing or rales.   Abdominal:      General: Bowel sounds are normal. There is no distension.      Palpations: Abdomen is soft.      Tenderness: There is no abdominal tenderness. There is no guarding or rebound.   Musculoskeletal:         General: No tenderness. Normal range of motion.      Cervical back: Neck supple.   Skin:     General: Skin is warm and dry.   Neurological:      Mental Status: She is alert and oriented to person, place, and time.      Cranial Nerves: No cranial nerve deficit.   Psychiatric:         Behavior: Behavior normal.           Significant Labs: All pertinent labs within the past 24 hours have been reviewed.  CMP:   Recent Labs   Lab 06/12/23  0758 06/12/23  1351 06/13/23  0412    135* 140   K 4.2 4.0 3.6   CL 98 97 98   CO2 26 26 24   * 272* 151*   BUN 28* 28* 23   CREATININE 1.6* 1.3 1.2   CALCIUM 9.4 9.2 9.5   PROT 6.8 7.0 7.0   ALBUMIN 2.6* 2.6* 2.4*   BILITOT 4.4* 5.2* 6.2*   ALKPHOS 577* 736* 1,010*   * 437* 555*   * 191* 309*   ANIONGAP 12 12 18*         Significant Imaging: I have reviewed all pertinent imaging results/findings within the past 24 hours.

## 2023-06-14 ENCOUNTER — ANESTHESIA (OUTPATIENT)
Dept: ENDOSCOPY | Facility: HOSPITAL | Age: 63
DRG: 987 | End: 2023-06-14
Payer: COMMERCIAL

## 2023-06-14 LAB
ALBUMIN SERPL BCP-MCNC: 1.9 G/DL (ref 3.5–5.2)
ALLENS TEST: ABNORMAL
ALP SERPL-CCNC: 1017 U/L (ref 55–135)
ALT SERPL W/O P-5'-P-CCNC: 286 U/L (ref 10–44)
AMMONIA PLAS-SCNC: 38 UMOL/L (ref 10–50)
ANION GAP SERPL CALC-SCNC: 15 MMOL/L (ref 8–16)
ANION GAP SERPL CALC-SCNC: 15 MMOL/L (ref 8–16)
ANISOCYTOSIS BLD QL SMEAR: SLIGHT
AST SERPL-CCNC: 436 U/L (ref 10–40)
BASOPHILS # BLD AUTO: 0.02 K/UL (ref 0–0.2)
BASOPHILS NFR BLD: 0.1 % (ref 0–1.9)
BILIRUB SERPL-MCNC: 5.9 MG/DL (ref 0.1–1)
BUN SERPL-MCNC: 19 MG/DL (ref 8–23)
BUN SERPL-MCNC: 28 MG/DL (ref 8–23)
BURR CELLS BLD QL SMEAR: ABNORMAL
CALCIUM SERPL-MCNC: 8.4 MG/DL (ref 8.7–10.5)
CALCIUM SERPL-MCNC: 9 MG/DL (ref 8.7–10.5)
CHLORIDE SERPL-SCNC: 102 MMOL/L (ref 95–110)
CHLORIDE SERPL-SCNC: 99 MMOL/L (ref 95–110)
CO2 SERPL-SCNC: 22 MMOL/L (ref 23–29)
CO2 SERPL-SCNC: 23 MMOL/L (ref 23–29)
CREAT SERPL-MCNC: 1.2 MG/DL (ref 0.5–1.4)
CREAT SERPL-MCNC: 2 MG/DL (ref 0.5–1.4)
DELSYS: ABNORMAL
DIFFERENTIAL METHOD: ABNORMAL
EOSINOPHIL # BLD AUTO: 0 K/UL (ref 0–0.5)
EOSINOPHIL NFR BLD: 0 % (ref 0–8)
ERYTHROCYTE [DISTWIDTH] IN BLOOD BY AUTOMATED COUNT: 13.2 % (ref 11.5–14.5)
EST. GFR  (NO RACE VARIABLE): 27.7 ML/MIN/1.73 M^2
EST. GFR  (NO RACE VARIABLE): 51.2 ML/MIN/1.73 M^2
GLUCOSE SERPL-MCNC: 170 MG/DL (ref 70–110)
GLUCOSE SERPL-MCNC: 247 MG/DL (ref 70–110)
HCO3 UR-SCNC: 26.6 MMOL/L (ref 24–28)
HCT VFR BLD AUTO: 31.5 % (ref 37–48.5)
HGB BLD-MCNC: 9.9 G/DL (ref 12–16)
HYPOCHROMIA BLD QL SMEAR: ABNORMAL
IMM GRANULOCYTES # BLD AUTO: 0.54 K/UL (ref 0–0.04)
IMM GRANULOCYTES NFR BLD AUTO: 2.5 % (ref 0–0.5)
LACTATE SERPL-SCNC: 1.6 MMOL/L (ref 0.5–2.2)
LYMPHOCYTES # BLD AUTO: 0.7 K/UL (ref 1–4.8)
LYMPHOCYTES NFR BLD: 3.1 % (ref 18–48)
MAGNESIUM SERPL-MCNC: 2.2 MG/DL (ref 1.6–2.6)
MAGNESIUM SERPL-MCNC: 2.3 MG/DL (ref 1.6–2.6)
MCH RBC QN AUTO: 26.6 PG (ref 27–31)
MCHC RBC AUTO-ENTMCNC: 31.4 G/DL (ref 32–36)
MCV RBC AUTO: 85 FL (ref 82–98)
MONOCYTES # BLD AUTO: 1.7 K/UL (ref 0.3–1)
MONOCYTES NFR BLD: 8.1 % (ref 4–15)
NEUTROPHILS # BLD AUTO: 18.4 K/UL (ref 1.8–7.7)
NEUTROPHILS NFR BLD: 86.2 % (ref 38–73)
NRBC BLD-RTO: 0 /100 WBC
OSMOLALITY SERPL: 305 MOSM/KG (ref 275–295)
OVALOCYTES BLD QL SMEAR: ABNORMAL
PCO2 BLDA: 44 MMHG (ref 35–45)
PH SMN: 7.39 [PH] (ref 7.35–7.45)
PHOSPHATE SERPL-MCNC: 3.9 MG/DL (ref 2.7–4.5)
PLATELET # BLD AUTO: 238 K/UL (ref 150–450)
PMV BLD AUTO: 12.9 FL (ref 9.2–12.9)
PO2 BLDA: 55 MMHG (ref 40–60)
POC BE: 2 MMOL/L
POC SATURATED O2: 87 % (ref 95–100)
POC TCO2: 28 MMOL/L (ref 24–29)
POCT GLUCOSE: 181 MG/DL (ref 70–110)
POCT GLUCOSE: 199 MG/DL (ref 70–110)
POCT GLUCOSE: 226 MG/DL (ref 70–110)
POCT GLUCOSE: 242 MG/DL (ref 70–110)
POIKILOCYTOSIS BLD QL SMEAR: SLIGHT
POLYCHROMASIA BLD QL SMEAR: ABNORMAL
POTASSIUM SERPL-SCNC: 3.9 MMOL/L (ref 3.5–5.1)
POTASSIUM SERPL-SCNC: 4.3 MMOL/L (ref 3.5–5.1)
PROT SERPL-MCNC: 5.9 G/DL (ref 6–8.4)
RBC # BLD AUTO: 3.72 M/UL (ref 4–5.4)
SAMPLE: ABNORMAL
SITE: ABNORMAL
SODIUM SERPL-SCNC: 137 MMOL/L (ref 136–145)
SODIUM SERPL-SCNC: 139 MMOL/L (ref 136–145)
SPHEROCYTES BLD QL SMEAR: ABNORMAL
WBC # BLD AUTO: 21.37 K/UL (ref 3.9–12.7)

## 2023-06-14 PROCEDURE — 99233 SBSQ HOSP IP/OBS HIGH 50: CPT | Mod: ,,, | Performed by: STUDENT IN AN ORGANIZED HEALTH CARE EDUCATION/TRAINING PROGRAM

## 2023-06-14 PROCEDURE — 83930 ASSAY OF BLOOD OSMOLALITY: CPT | Performed by: HOSPITALIST

## 2023-06-14 PROCEDURE — 20600001 HC STEP DOWN PRIVATE ROOM

## 2023-06-14 PROCEDURE — 80053 COMPREHEN METABOLIC PANEL: CPT | Performed by: STUDENT IN AN ORGANIZED HEALTH CARE EDUCATION/TRAINING PROGRAM

## 2023-06-14 PROCEDURE — 99233 PR SUBSEQUENT HOSPITAL CARE,LEVL III: ICD-10-PCS | Mod: ,,, | Performed by: STUDENT IN AN ORGANIZED HEALTH CARE EDUCATION/TRAINING PROGRAM

## 2023-06-14 PROCEDURE — 36415 COLL VENOUS BLD VENIPUNCTURE: CPT | Performed by: HOSPITALIST

## 2023-06-14 PROCEDURE — 84100 ASSAY OF PHOSPHORUS: CPT | Performed by: FAMILY MEDICINE

## 2023-06-14 PROCEDURE — 83735 ASSAY OF MAGNESIUM: CPT | Performed by: FAMILY MEDICINE

## 2023-06-14 PROCEDURE — 25000003 PHARM REV CODE 250: Performed by: STUDENT IN AN ORGANIZED HEALTH CARE EDUCATION/TRAINING PROGRAM

## 2023-06-14 PROCEDURE — 83605 ASSAY OF LACTIC ACID: CPT | Performed by: HOSPITALIST

## 2023-06-14 PROCEDURE — 25000003 PHARM REV CODE 250: Performed by: FAMILY MEDICINE

## 2023-06-14 PROCEDURE — 36415 COLL VENOUS BLD VENIPUNCTURE: CPT | Performed by: FAMILY MEDICINE

## 2023-06-14 PROCEDURE — 82140 ASSAY OF AMMONIA: CPT | Performed by: STUDENT IN AN ORGANIZED HEALTH CARE EDUCATION/TRAINING PROGRAM

## 2023-06-14 PROCEDURE — 36415 COLL VENOUS BLD VENIPUNCTURE: CPT | Performed by: STUDENT IN AN ORGANIZED HEALTH CARE EDUCATION/TRAINING PROGRAM

## 2023-06-14 PROCEDURE — 85025 COMPLETE CBC W/AUTO DIFF WBC: CPT | Performed by: FAMILY MEDICINE

## 2023-06-14 PROCEDURE — 80048 BASIC METABOLIC PNL TOTAL CA: CPT | Mod: XB | Performed by: HOSPITALIST

## 2023-06-14 PROCEDURE — 63600175 PHARM REV CODE 636 W HCPCS: Performed by: STUDENT IN AN ORGANIZED HEALTH CARE EDUCATION/TRAINING PROGRAM

## 2023-06-14 PROCEDURE — 83735 ASSAY OF MAGNESIUM: CPT | Mod: 91 | Performed by: HOSPITALIST

## 2023-06-14 PROCEDURE — 99232 SBSQ HOSP IP/OBS MODERATE 35: CPT | Mod: ,,, | Performed by: INTERNAL MEDICINE

## 2023-06-14 PROCEDURE — 63600175 PHARM REV CODE 636 W HCPCS: Performed by: HOSPITALIST

## 2023-06-14 PROCEDURE — 99232 PR SUBSEQUENT HOSPITAL CARE,LEVL II: ICD-10-PCS | Mod: ,,, | Performed by: INTERNAL MEDICINE

## 2023-06-14 PROCEDURE — S0030 INJECTION, METRONIDAZOLE: HCPCS | Performed by: STUDENT IN AN ORGANIZED HEALTH CARE EDUCATION/TRAINING PROGRAM

## 2023-06-14 RX ORDER — LACTULOSE 10 G/15ML
200 SOLUTION ORAL; RECTAL ONCE
Status: COMPLETED | OUTPATIENT
Start: 2023-06-14 | End: 2023-06-14

## 2023-06-14 RX ADMIN — FAMOTIDINE 20 MG: 20 TABLET, FILM COATED ORAL at 08:06

## 2023-06-14 RX ADMIN — SODIUM CHLORIDE, POTASSIUM CHLORIDE, SODIUM LACTATE AND CALCIUM CHLORIDE 500 ML: 600; 310; 30; 20 INJECTION, SOLUTION INTRAVENOUS at 10:06

## 2023-06-14 RX ADMIN — METRONIDAZOLE 500 MG: 500 INJECTION, SOLUTION INTRAVENOUS at 05:06

## 2023-06-14 RX ADMIN — INSULIN ASPART 1 UNITS: 100 INJECTION, SOLUTION INTRAVENOUS; SUBCUTANEOUS at 02:06

## 2023-06-14 RX ADMIN — LACTULOSE 200 G: 10 SOLUTION ORAL at 11:06

## 2023-06-14 RX ADMIN — PIPERACILLIN SODIUM AND TAZOBACTAM SODIUM 4.5 G: 4; .5 INJECTION, POWDER, LYOPHILIZED, FOR SOLUTION INTRAVENOUS at 09:06

## 2023-06-14 RX ADMIN — PIPERACILLIN SODIUM AND TAZOBACTAM SODIUM 4.5 G: 4; .5 INJECTION, POWDER, LYOPHILIZED, FOR SOLUTION INTRAVENOUS at 11:06

## 2023-06-14 RX ADMIN — SODIUM CHLORIDE, POTASSIUM CHLORIDE, SODIUM LACTATE AND CALCIUM CHLORIDE 1000 ML: 600; 310; 30; 20 INJECTION, SOLUTION INTRAVENOUS at 09:06

## 2023-06-14 RX ADMIN — CEFTRIAXONE 2 G: 2 INJECTION, POWDER, FOR SOLUTION INTRAMUSCULAR; INTRAVENOUS at 08:06

## 2023-06-14 RX ADMIN — ESCITALOPRAM OXALATE 10 MG: 10 TABLET ORAL at 08:06

## 2023-06-14 RX ADMIN — INSULIN ASPART 2 UNITS: 100 INJECTION, SOLUTION INTRAVENOUS; SUBCUTANEOUS at 05:06

## 2023-06-14 NOTE — NURSING
Bedside handoff report completed pt laying in supine position hob elevated resp even and unlabored no distress noted at this time pt. Seems weaker than usual. Held her pletal per endoscopy for her ERCP procedure tomorrow safety precautions maintained.

## 2023-06-14 NOTE — ASSESSMENT & PLAN NOTE
Perhaps related to sepsis.  Patient on 6/14 a.m., increasingly lethargic.  Per , last night she had jerking movements when attempting to go to the restroom with assistance.  On exam this morning she had asterixis and required sternal rubbing to awaken her.  - Ammonia was ordered and was normal for the past 2 days, patient has not had a bowel movement in over a week per the patient's .  We will give lactulose enema regardless of ammonia level  - VBG ordered, personal interpretation reflects no evidence of hypercapnic respiratory failure  - blood cultures were collected yesterday x2, no growth today  - escalated patient's antibiotics from ceftriaxone and metronidazole to Zosyn given her increasing white blood cell count

## 2023-06-14 NOTE — ASSESSMENT & PLAN NOTE
Discontinued all antihypertensive medications given worsening blood pressure and concerns for sepsis

## 2023-06-14 NOTE — ASSESSMENT & PLAN NOTE
Creatinine 1.7 on presentation ; baseline 1.0  - IVFs stopped, recurrent PAPA on 6/12 with FeNA indication pre-renal disease. 1L LR bolus ordered over 3 hrs  - renal function improved on 06/13, continuing IV fluids  - avoid nephrotoxic agents as appropriate  - continue to monitor

## 2023-06-14 NOTE — SUBJECTIVE & OBJECTIVE
Interval History: No acute events overnight. Patient this AM very difficult to arouse. Required sternal rub to awaken the patient. Patient was Aox4 upon awakening however, tenderness to palpation along RUQ. Patient's  reports no recent bowel movements in over a week.  Given concern for acute encephalopathy, ammonia and VBG ordered.  KUB also ordered.    Review of Systems   Constitutional:  Negative for chills and fever.   HENT:  Negative for congestion and sore throat.    Eyes:  Negative for photophobia and visual disturbance.   Respiratory:  Negative for cough and shortness of breath.    Cardiovascular:  Negative for chest pain and palpitations.   Gastrointestinal:  Negative for abdominal pain, constipation, diarrhea, nausea and vomiting.   Endocrine: Negative for cold intolerance and heat intolerance.   Genitourinary:  Negative for dysuria and hematuria.   Musculoskeletal:  Negative for arthralgias and myalgias.   Skin:  Negative for rash.   Allergic/Immunologic: Negative for environmental allergies and food allergies.   Neurological:  Negative for dizziness, seizures, syncope and headaches.   Hematological:  Negative for adenopathy. Does not bruise/bleed easily.   Psychiatric/Behavioral:  Negative for hallucinations and suicidal ideas.    Objective:     Vital Signs (Most Recent):  Temp: 99.5 °F (37.5 °C) (06/14/23 1145)  Pulse: 100 (06/14/23 1145)  Resp: 18 (06/14/23 1145)  BP: (!) 118/58 (06/14/23 1145)  SpO2: (!) 93 % (06/14/23 1145) Vital Signs (24h Range):  Temp:  [96 °F (35.6 °C)-99.5 °F (37.5 °C)] 99.5 °F (37.5 °C)  Pulse:  [] 100  Resp:  [18-22] 18  SpO2:  [90 %-100 %] 93 %  BP: (100-164)/(50-82) 118/58     Weight: 62.8 kg (138 lb 7.2 oz)  Body mass index is 23.76 kg/m².    Intake/Output Summary (Last 24 hours) at 6/14/2023 1245  Last data filed at 6/14/2023 1142  Gross per 24 hour   Intake 2445.46 ml   Output --   Net 2445.46 ml           Physical Exam  Constitutional:       Appearance: She  is well-developed.   HENT:      Head: Normocephalic and atraumatic.   Eyes:      Conjunctiva/sclera: Conjunctivae normal.      Pupils: Pupils are equal, round, and reactive to light.   Neck:      Thyroid: No thyromegaly.      Vascular: No JVD.   Cardiovascular:      Rate and Rhythm: Normal rate and regular rhythm.      Heart sounds: Normal heart sounds. No murmur heard.    No friction rub. No gallop.   Pulmonary:      Effort: Pulmonary effort is normal.      Breath sounds: Normal breath sounds. No wheezing or rales.   Abdominal:      General: Bowel sounds are normal. There is no distension.      Palpations: Abdomen is soft.      Tenderness: There is no abdominal tenderness. There is no guarding or rebound.   Musculoskeletal:         General: No tenderness. Normal range of motion.      Cervical back: Neck supple.   Skin:     General: Skin is warm and dry.   Neurological:      Mental Status: She is oriented to person, place, and time. She is lethargic and confused.      Cranial Nerves: No cranial nerve deficit.      Comments: + asterixis   Psychiatric:         Behavior: Behavior normal.           Significant Labs: All pertinent labs within the past 24 hours have been reviewed.  CMP:   Recent Labs   Lab 06/12/23  1351 06/13/23  0412 06/14/23  0354   * 140 139   K 4.0 3.6 4.3   CL 97 98 102   CO2 26 24 22*   * 151* 170*   BUN 28* 23 19   CREATININE 1.3 1.2 1.2   CALCIUM 9.2 9.5 9.0   PROT 7.0 7.0 5.9*   ALBUMIN 2.6* 2.4* 1.9*   BILITOT 5.2* 6.2* 5.9*   ALKPHOS 736* 1,010* 1,017*   * 555* 436*   * 309* 286*   ANIONGAP 12 18* 15         Significant Imaging: I have reviewed all pertinent imaging results/findings within the past 24 hours.

## 2023-06-14 NOTE — TREATMENT PLAN
AES Treatment Plan    Mariann Huff is a 63 y.o. female admitted to hospital 6/9/2023 (Hospital Day: 6) due to Type 2 diabetes mellitus with hyperosmolar hyperglycemic state (HHS).     Interval History  Overnight, patient noted to develop intermittent confusion. On bedside encounter, she was oriented to name and situation, but not location. Vital signs stable on room air. Labs notable for on-going leukocytosis since yesterday (21), stable hyperbilirubinemia (5.9 from 6.2), and stable transaminitis.     Objective  Temp:  [96 °F (35.6 °C)-99.1 °F (37.3 °C)] 98.9 °F (37.2 °C) (06/14 0721)  Pulse:  [] 101 (06/14 0721)  BP: (100-164)/(50-82) 100/50 (06/14 0721)  Resp:  [15-22] 20 (06/14 0500)  SpO2:  [90 %-100 %] 90 % (06/14 0721)    General: Alert, disoriented, no distress  Abdomen: Non-distended. Normal tympany. Soft. Tender to palpation right lower quadrant . No peritoneal signs.    Laboratory  Lab Results   Component Value Date    WBC 21.37 (H) 06/14/2023    HGB 9.9 (L) 06/14/2023    HCT 31.5 (L) 06/14/2023    MCV 85 06/14/2023     06/14/2023       Lab Results   Component Value Date     (H) 06/14/2023     (H) 06/14/2023    ALKPHOS 1,017 (H) 06/14/2023    BILITOT 5.9 (H) 06/14/2023     Assessment  This is a 63 year old female with a PMH significant for CAD (status post PCI in 2014), DVT (on Apixaban), MURTAZA, pancreatitis (05/2023; attributed to TRULICITY usage), peripheral artery disease (on PLETAL), and T2DM who was admitted to [facility] on 06/09 for management of T2DM with HHS. Hospital course associated with onset of new hyperbilirubinemia and transaminitis on 06/11 with elevated lipase (>1000), but without patient reporting abdominal pain. Work-up significant for MRI/MRCP on 06/11 showing inflammatory changes around pancreatic head (unclear if new or residual inflammation from prior episode of pancreatitis) with gallstone sludge, but without biliary dilatation or choledocholithiasis. AES  consulted for evaluation of recurrent pancreatitis of unclear etiology. LFT's remain stable as of 06/14, but patient now with intermittent encephalopathy.      Recommendations    -Will need to postpone EUS until 06/15 to allow proper hold of PLETAL.   -Agree with continuation of antibiotics with gram-negative coverage and following-up blood cultures from yesterday.   -Okay for diet today and NPO for 06/15.   -CMP daily.   - Plan of care was discussed with primary team.    Thank you for involving us in the care of Elizaamanda TRISTIN Huff. Please call with any additional questions, concerns or changes in the patient's clinical status.        Vijay Bonilla MD, PGY-V  Gastroenterology Fellow  [facility]

## 2023-06-14 NOTE — ASSESSMENT & PLAN NOTE
"Lipase >2000 on presentation, downtrending without any identifiable cause for elevation. Patient without any abdominal pain. Discontinue HCTZ as it could potentially be cause of pancreatitis as well  - US abdomen with gallbladder sludge and "prominence" of the pancreatic duct   - liver enzymes, alk-phos, bilirubin elevated on 06/11, suggestive of possible choledocholithiasis as cause of pancreatitis.  MRCP ordered  - MRCP on my personal interpretation without any focal obstruction or retained stones, however liver enzymes continue to elevate  - AES was consulted for findings and after discussion, recommended ERCP/EUS once off apixaban and pletal      "

## 2023-06-14 NOTE — ASSESSMENT & PLAN NOTE
- HHS on admission. Recent pancreatitis admission, not yet back to normal po intake with worsening hepatic function. Taking Glipizide and Farxiga at home. Never picked up Levemir Rx recently prescribed by her PCP  - 24 hour glucose trend: mid 100s    - Noted plan for ERCP 06/15/2023  - Continue Levemir 20 units at night  - Continue aspart 10 units TIDAC+LDC once meals resume    - If blood glucose greater than 300, please ask patient not to eat food or drink anything other than water until correctional insulin has brought it back below 250

## 2023-06-14 NOTE — PROGRESS NOTES
"[facility] - Telemetry Stepdown  Endocrinology  Progress Note    Admit Date: 2023     63 year old  female with type 2 diabetes mellitus, CKD, CAD, hypertension, hyperlipidemia, MURTAZA (not on CPAP), PAD, Afib and hx of DVT on Eliquis and recent admission for pancreatitis who presents with HHS and developing possible biliary obstruction.     - Patient recently admitted  to  for acute pancreatitis attributed to Trulicity, which was discontinued upon d/c. Per pt's PCP this was 2nd episode of pancreatitis while on trulicity -- prior episode when increasing dose of Trulicity.   Pt states upon d/c  she was feeling slight improvement but still having intermittent n/v and not tolerating normal diet. N/V began to get worse in last few days which prompted admission. No abdominal pain on presentation for current admission, unlike last admit when she had severe abd pain for pancreatitis.    In the ED patient afebrile and hemodynamically stable saturating well on room air. Lipase >2000. BG >700 and serum osmo 326 after 1L IVFs. B-hydroxybutyrate 1.3. pH 7.4 and bicarb 34. AG 12. Patient started on aggressive IVFs and insulin gtt for HHS.     - Endocrinology consulted for initial HHS and glucose management      Regarding Type 2 Diabetes Mellitus:    - Initially diagnosed with Type 2 diabetes mellitus: pt not sure - states "long time ago"; at least as far back as  per review of elevated a1c in epic  - Home diabetic medications include: Farxiga 10mg qd, Glipizide ER 10mg daily, Levemir 20u qhs (but never started). She has been on MDI previously, most recently she was on Toujeo and Humalog discontinued 2021 but she states she was on insulin up until about 10mo ago.  - Family History: Not asked  - Weight based dosin kg x 0.4 (CKD) = 25 TDD x 0.5 = 12 basal / 12 prandial  - 1700/TDD = 68 (estimated insulin sensitivity factor)  - 450/TDD = 18 (estimated starting carb ratio for prandial " "dosing)    Lab Results   Component Value Date    HGBA1C 8.1 (H) 05/25/2023    HGBA1C 8.2 (H) 04/05/2023    HGBA1C 8.8 (H) 03/29/2023    CPEPTIDE 1.36 06/12/2017     Lab Results   Component Value Date    EGFRNORACEVR 51.2 (A) 06/13/2023    EGFRNORACEVR 46.5 (A) 06/12/2023    EGFRNORACEVR 36.2 (A) 06/12/2023       Interval HPI:   Overnight events:  No acute events reported overnight  Eating:   NPO  Nausea: No  Hypoglycemia and intervention: No  Fever: No  TPN and/or TF: No  If yes, type of TF/TPN and rate: NA    BP (!) 100/50   Pulse 101   Temp 98.9 °F (37.2 °C)   Resp 20   Ht 5' 4" (1.626 m)   Wt 62.8 kg (138 lb 7.2 oz)   LMP  (LMP Unknown)   SpO2 (!) 91%   BMI 23.76 kg/m²     Labs Reviewed and Include    Recent Labs   Lab 06/14/23  0354   *   CALCIUM 9.0   ALBUMIN 1.9*   PROT 5.9*      K 4.3   CO2 22*      BUN 19   CREATININE 1.2   ALKPHOS 1,017*   *   *   BILITOT 5.9*     Lab Results   Component Value Date    WBC 21.37 (H) 06/14/2023    HGB 9.9 (L) 06/14/2023    HCT 31.5 (L) 06/14/2023    MCV 85 06/14/2023     06/14/2023     No results for input(s): TSH, FREET4 in the last 168 hours.  Lab Results   Component Value Date    HGBA1C 8.1 (H) 05/25/2023       Nutritional status:   Body mass index is 23.76 kg/m².  Lab Results   Component Value Date    ALBUMIN 1.9 (L) 06/14/2023    ALBUMIN 2.4 (L) 06/13/2023    ALBUMIN 2.6 (L) 06/12/2023     No results found for: PREALBUMIN    Estimated Creatinine Clearance: 42 mL/min (based on SCr of 1.2 mg/dL).    Accu-Checks  Recent Labs     06/11/23 2134 06/12/23  0724 06/12/23  1229 06/12/23  1647 06/12/23  2117 06/13/23  0742 06/13/23  1206 06/13/23  1623 06/13/23  2033 06/14/23  0731   POCTGLUCOSE 253* 293* 303* 212* 156* 164* 172* 162* 139* 181*       Current Medications and/or Treatments Impacting Glycemic Control  Immunotherapy:    Immunosuppressants       None          Steroids:   Hormones (From admission, onward)      Start     " Stop Route Frequency Ordered    06/09/23 2028  melatonin tablet 6 mg         -- Oral Nightly PRN 06/09/23 1931          Pressors:    Autonomic Drugs (From admission, onward)      None          Hyperglycemia/Diabetes Medications:   Antihyperglycemics (From admission, onward)      Start     Stop Route Frequency Ordered    06/12/23 2100  insulin detemir U-100 (Levemir) pen 20 Units         -- SubQ Nightly 06/12/23 0904    06/12/23 1645  insulin aspart U-100 pen 10 Units         -- SubQ 3 times daily with meals 06/12/23 1334    06/10/23 1315  insulin regular in 0.9 % NaCl 100 unit/100 mL (1 unit/mL) infusion        Question:  Enter initial dose (Units/hr):  Answer:  0.6    06/10 2200 IV Continuous 06/10/23 1211    06/10/23 1309  insulin aspart U-100 pen 0-5 Units         -- SubQ As needed (PRN) 06/10/23 1211            ASSESSMENT and PLAN    Cardiac/Vascular  Hyperlipidemia associated with type 2 diabetes mellitus  - On Lipitor 40mg and Repatha outpatient  - Management per primary    Renal/  PAPA (acute kidney injury)  - Improved/resolved. Likely 2/2 IVVD 2/2 HHS on admission; baseline Cr appears to be around 1.1-1.2  - Management per primary    Endocrine  * Type 2 diabetes mellitus with hyperosmolar hyperglycemic state (HHS)  - HHS on admission. Recent pancreatitis admission, not yet back to normal po intake with worsening hepatic function. Taking Glipizide and Farxiga at home. Never picked up Levemir Rx recently prescribed by her PCP  - 24 hour glucose trend: mid 100s    - Noted plan for ERCP 06/15/2023  - Continue Levemir 20 units at night  - Given poor appetite, stop mealtime aspart and continue only LDC    - If blood glucose greater than 300, please ask patient not to eat food or drink anything other than water until correctional insulin has brought it back below 250    GI  History of pancreatitis  - She has now had two episodes of pancreatitis on Trulicity, however suspicion for choledocholithiasis as cause for  pancreatitis. GI evaluating for worsening hepatic function  - Hold Trulicity at discharge      Mario Alberto Carroll DO  [facility], [address]  [address] [place], [place], [address]    Office: [phone]  Fax: [phone]    Disclaimer: This note has been generated using voice-recognition software. There may be typographical errors that have been missed during proof-reading.    The above history labs imaging impression and plan were discussed with attending physician who is in agreement and also took part in this patient's care.  I personally reviewed all of the patients available medications, labs, imaging, vitals, allergies, medical history.

## 2023-06-14 NOTE — ASSESSMENT & PLAN NOTE
This patient does have evidence of infective focus  My overall impression is sepsis.  Source: Abdominal and potentially bacteremia  Antibiotics given-   Antibiotics (72h ago, onward)    Start     Stop Route Frequency Ordered    06/14/23 1100  piperacillin-tazobactam (ZOSYN) 4.5 g in dextrose 5 % in water (D5W) 5 % 100 mL IVPB (MB+)         -- IV Every 8 hours (non-standard times) 06/14/23 0959        Latest lactate reviewed-  Recent Labs   Lab 06/13/23  0813   LACTATE 1.3     Organ dysfunction indicated by Acute liver injury    Fluid challenge Not needed - patient is not hypotensive      Post- resuscitation assessment No - Post resuscitation assessment not needed       Will Not start Pressors- Levophed for MAP of 65  Source control achieved by: Zosyn and potential AES procedure on 6/15

## 2023-06-14 NOTE — CLINICAL REVIEW
Sepsis Screen (most recent)       Sepsis Screen (IP) - 06/14/23 0924       Is the patient's history or complaint suggestive of a possible infection? Yes  -    Are there at least two of the following signs and symptoms present? Yes  -    Sepsis signs/symptoms - Tachycardia Tachycardia     >90  -    Sepsis signs/symptoms - WBC WBC < 4,000 or WBC > 12,000  -    Are any of the following organ dysfunction criteria present and not considered to be due to a chronic condition? Yes  -    Organ Dysfunction Criteria Total Bilirubin > 2.0 Platelet count < 100,000  -    Organ Dysfunction Criteria - O2 O2 Saturation < 95% on room air  -    Initiate Sepsis Protocol No  -    Reason sepsis not considered Pt. receiving appropriate management  -              User Key  (r) = Recorded By, (t) = Taken By, (c) = Cosigned By      Initials Name     Liliane Whittaker RN

## 2023-06-14 NOTE — PLAN OF CARE
Problem: Adult Inpatient Plan of Care  Goal: Plan of Care Review  Outcome: Ongoing, Progressing  Goal: Patient-Specific Goal (Individualized)  Outcome: Ongoing, Progressing  Goal: Absence of Hospital-Acquired Illness or Injury  Outcome: Ongoing, Progressing  Goal: Optimal Comfort and Wellbeing  Outcome: Ongoing, Progressing  Goal: Readiness for Transition of Care  Outcome: Ongoing, Progressing     Problem: Diabetic Ketoacidosis  Goal: Fluid and Electrolyte Balance with Absence of Ketosis  Outcome: Ongoing, Progressing     Problem: Diabetes Comorbidity  Goal: Blood Glucose Level Within Targeted Range  Outcome: Ongoing, Progressing     Problem: Fluid and Electrolyte Imbalance (Acute Kidney Injury/Impairment)  Goal: Fluid and Electrolyte Balance  Outcome: Ongoing, Progressing  Patient was recived in bed awake alert and verbally responsive. Skin is clean warm and dry to touch. All safety measure in place, call light within reach. Will continue with plan of care

## 2023-06-14 NOTE — SUBJECTIVE & OBJECTIVE
"Interval HPI:   Overnight events:  No acute events reported overnight  Eating:   NPO  Nausea: No  Hypoglycemia and intervention: No  Fever: No  TPN and/or TF: No  If yes, type of TF/TPN and rate: NA    BP (!) 100/50   Pulse 101   Temp 98.9 °F (37.2 °C)   Resp 20   Ht 5' 4" (1.626 m)   Wt 62.8 kg (138 lb 7.2 oz)   LMP  (LMP Unknown)   SpO2 (!) 91%   BMI 23.76 kg/m²     Labs Reviewed and Include    Recent Labs   Lab 06/14/23  0354   *   CALCIUM 9.0   ALBUMIN 1.9*   PROT 5.9*      K 4.3   CO2 22*      BUN 19   CREATININE 1.2   ALKPHOS 1,017*   *   *   BILITOT 5.9*     Lab Results   Component Value Date    WBC 21.37 (H) 06/14/2023    HGB 9.9 (L) 06/14/2023    HCT 31.5 (L) 06/14/2023    MCV 85 06/14/2023     06/14/2023     No results for input(s): TSH, FREET4 in the last 168 hours.  Lab Results   Component Value Date    HGBA1C 8.1 (H) 05/25/2023       Nutritional status:   Body mass index is 23.76 kg/m².  Lab Results   Component Value Date    ALBUMIN 1.9 (L) 06/14/2023    ALBUMIN 2.4 (L) 06/13/2023    ALBUMIN 2.6 (L) 06/12/2023     No results found for: PREALBUMIN    Estimated Creatinine Clearance: 42 mL/min (based on SCr of 1.2 mg/dL).    Accu-Checks  Recent Labs     06/11/23  2134 06/12/23  0724 06/12/23  1229 06/12/23  1647 06/12/23  2117 06/13/23  0742 06/13/23  1206 06/13/23  1623 06/13/23  2033 06/14/23  0731   POCTGLUCOSE 253* 293* 303* 212* 156* 164* 172* 162* 139* 181*       Current Medications and/or Treatments Impacting Glycemic Control  Immunotherapy:    Immunosuppressants       None          Steroids:   Hormones (From admission, onward)      Start     Stop Route Frequency Ordered    06/09/23 2028  melatonin tablet 6 mg         -- Oral Nightly PRN 06/09/23 1931          Pressors:    Autonomic Drugs (From admission, onward)      None          Hyperglycemia/Diabetes Medications:   Antihyperglycemics (From admission, onward)      Start     Stop Route Frequency " Ordered    06/12/23 2100  insulin detemir U-100 (Levemir) pen 20 Units         -- SubQ Nightly 06/12/23 0904    06/12/23 1645  insulin aspart U-100 pen 10 Units         -- SubQ 3 times daily with meals 06/12/23 1334    06/10/23 1315  insulin regular in 0.9 % NaCl 100 unit/100 mL (1 unit/mL) infusion        Question:  Enter initial dose (Units/hr):  Answer:  0.6    06/10 2200 IV Continuous 06/10/23 1211    06/10/23 1309  insulin aspart U-100 pen 0-5 Units         -- SubQ As needed (PRN) 06/10/23 1211

## 2023-06-14 NOTE — PLAN OF CARE
Problem: Adult Inpatient Plan of Care  Goal: Plan of Care Review  Outcome: Ongoing, Progressing  Goal: Patient-Specific Goal (Individualized)  Outcome: Ongoing, Progressing  Goal: Absence of Hospital-Acquired Illness or Injury  Outcome: Ongoing, Progressing  Goal: Optimal Comfort and Wellbeing  Outcome: Ongoing, Progressing  Goal: Readiness for Transition of Care  Outcome: Ongoing, Progressing     Problem: Diabetic Ketoacidosis  Goal: Fluid and Electrolyte Balance with Absence of Ketosis  Outcome: Ongoing, Progressing     Problem: Diabetes Comorbidity  Goal: Blood Glucose Level Within Targeted Range  Outcome: Ongoing, Progressing     Problem: Fluid and Electrolyte Imbalance (Acute Kidney Injury/Impairment)  Goal: Fluid and Electrolyte Balance  Outcome: Ongoing, Progressing     Problem: Oral Intake Inadequate (Acute Kidney Injury/Impairment)  Goal: Optimal Nutrition Intake  Outcome: Ongoing, Progressing     Problem: Renal Function Impairment (Acute Kidney Injury/Impairment)  Goal: Effective Renal Function  Outcome: Ongoing, Progressing     Problem: Adjustment to Illness (Sepsis/Septic Shock)  Goal: Optimal Coping  Outcome: Ongoing, Progressing     Problem: Bleeding (Sepsis/Septic Shock)  Goal: Absence of Bleeding  Outcome: Ongoing, Progressing     Problem: Glycemic Control Impaired (Sepsis/Septic Shock)  Goal: Blood Glucose Level Within Desired Range  Outcome: Ongoing, Progressing     Problem: Infection Progression (Sepsis/Septic Shock)  Goal: Absence of Infection Signs and Symptoms  Outcome: Ongoing, Progressing     Problem: Nutrition Impaired (Sepsis/Septic Shock)  Goal: Optimal Nutrition Intake  Outcome: Ongoing, Progressing   Pt laying in bed resting quietly easily arouses to voice stimuli she does has a intermittent confusion at times. Resp even and unlabored no distress noted at this time safety precautions maintained.

## 2023-06-14 NOTE — ASSESSMENT & PLAN NOTE
- hold pletal, ASA, and statin  - started on apixaban for PAD outpatient as well, holding in setting of upcoming procedure on 6/14

## 2023-06-14 NOTE — ASSESSMENT & PLAN NOTE
Given worsening liver enzymes, alk-phos, bilirubin concern for ongoing obstructive process.  - [facility] consulted, in speaking with them plan for EUS of pancreas on 06/15 as patient did not have her Pletal held as well.  Apixaban held in preparation

## 2023-06-14 NOTE — PROGRESS NOTES
[facility] Medicine  Progress Note    Patient Name: Mariann Huff  MRN: [id number]  Patient Class: IP- Inpatient   Admission Date: 6/9/2023  Length of Stay: 5 days  Attending Physician: Gregory Landers MD  Primary Care Provider: Jasbir Haney MD        Subjective:     Principal Problem:Acute encephalopathy        HPI:  63F with PMH of CAD with GINGER , HTN, HLD, DM2, MURTAZA (not on CPAP), PAD, Afib and hx of DVT on Eliquis and recent admission for pancreatitis wo presents to the ED with nausea and vomiting. Patient recently admitted 05/25 to 05/26 for first episode of acute pancreatitis. Lipase >2000 at that time. CT abdomen and US without evidence of biliary obstruction or pancreatic inflammation. Lipid panel wnl. Patient pain and n/v improved with treatment for acute pancreatitis felt secondary to home med Trulicity. She was discharged with new insulin regimen which she reports she never picked up from pharmacy so has been taking farxiga only at home. States that she has had progressively worsening n/v and po intolerance. States that she is not having severe pain similar to recent pancreatitis. Denies fevers, chills, chest pain, shortness of breath.     In the ED patient afebrile and hemodynamically stable saturating well on room air. Lipase >2000. BG >700 and serum osmo 326 after 1L IVFs. B-hydroxybutyrate 1.3. pH 7.4 and bicarb 34. AG 12. Patient started on aggressive IVFs and insulin gtt for management on pancreatitis and HHS. Admitted to the care of medicine for further evaluation and management.       Overview/Hospital Course:  Patient transitioned from insulin drip to subcutaneous insulin on 06/10.  On 06/11, patient developed new hyperbilirubinemia and worsening liver enzymes and given history of pancreatitis concern for possible choledocholithiasis.  MRCP ordered did not demonstrate any focal obstruction showever liver enzymes and alk phos increasing in size. In speaking with [facility],  patient to be off apixaban and pletal prior to ERCP.      Interval History: No acute events overnight. Patient this AM very difficult to arouse. Required sternal rub to awaken the patient. Patient was Aox4 upon awakening however, tenderness to palpation along RUQ. Patient's  reports no recent bowel movements in over a week.  Given concern for acute encephalopathy, ammonia and VBG ordered.  KUB also ordered.    Review of Systems   Constitutional:  Negative for chills and fever.   HENT:  Negative for congestion and sore throat.    Eyes:  Negative for photophobia and visual disturbance.   Respiratory:  Negative for cough and shortness of breath.    Cardiovascular:  Negative for chest pain and palpitations.   Gastrointestinal:  Negative for abdominal pain, constipation, diarrhea, nausea and vomiting.   Endocrine: Negative for cold intolerance and heat intolerance.   Genitourinary:  Negative for dysuria and hematuria.   Musculoskeletal:  Negative for arthralgias and myalgias.   Skin:  Negative for rash.   Allergic/Immunologic: Negative for environmental allergies and food allergies.   Neurological:  Negative for dizziness, seizures, syncope and headaches.   Hematological:  Negative for adenopathy. Does not bruise/bleed easily.   Psychiatric/Behavioral:  Negative for hallucinations and suicidal ideas.    Objective:     Vital Signs (Most Recent):  Temp: 99.5 °F (37.5 °C) (06/14/23 1145)  Pulse: 100 (06/14/23 1145)  Resp: 18 (06/14/23 1145)  BP: (!) 118/58 (06/14/23 1145)  SpO2: (!) 93 % (06/14/23 1145) Vital Signs (24h Range):  Temp:  [96 °F (35.6 °C)-99.5 °F (37.5 °C)] 99.5 °F (37.5 °C)  Pulse:  [] 100  Resp:  [18-22] 18  SpO2:  [90 %-100 %] 93 %  BP: (100-164)/(50-82) 118/58     Weight: 62.8 kg (138 lb 7.2 oz)  Body mass index is 23.76 kg/m².    Intake/Output Summary (Last 24 hours) at 6/14/2023 1245  Last data filed at 6/14/2023 1142  Gross per 24 hour   Intake 2445.46 ml   Output --   Net 2445.46 ml            Physical Exam  Constitutional:       Appearance: She is well-developed.   HENT:      Head: Normocephalic and atraumatic.   Eyes:      Conjunctiva/sclera: Conjunctivae normal.      Pupils: Pupils are equal, round, and reactive to light.   Neck:      Thyroid: No thyromegaly.      Vascular: No JVD.   Cardiovascular:      Rate and Rhythm: Normal rate and regular rhythm.      Heart sounds: Normal heart sounds. No murmur heard.    No friction rub. No gallop.   Pulmonary:      Effort: Pulmonary effort is normal.      Breath sounds: Normal breath sounds. No wheezing or rales.   Abdominal:      General: Bowel sounds are normal. There is no distension.      Palpations: Abdomen is soft.      Tenderness: There is no abdominal tenderness. There is no guarding or rebound.   Musculoskeletal:         General: No tenderness. Normal range of motion.      Cervical back: Neck supple.   Skin:     General: Skin is warm and dry.   Neurological:      Mental Status: She is oriented to person, place, and time. She is lethargic and confused.      Cranial Nerves: No cranial nerve deficit.      Comments: + asterixis   Psychiatric:         Behavior: Behavior normal.           Significant Labs: All pertinent labs within the past 24 hours have been reviewed.  CMP:   Recent Labs   Lab 06/12/23  1351 06/13/23  0412 06/14/23  0354   * 140 139   K 4.0 3.6 4.3   CL 97 98 102   CO2 26 24 22*   * 151* 170*   BUN 28* 23 19   CREATININE 1.3 1.2 1.2   CALCIUM 9.2 9.5 9.0   PROT 7.0 7.0 5.9*   ALBUMIN 2.6* 2.4* 1.9*   BILITOT 5.2* 6.2* 5.9*   ALKPHOS 736* 1,010* 1,017*   * 555* 436*   * 309* 286*   ANIONGAP 12 18* 15         Significant Imaging: I have reviewed all pertinent imaging results/findings within the past 24 hours.        Assessment/Plan:      * Acute encephalopathy  Perhaps related to sepsis.  Patient on 6/14 a.m., increasingly lethargic.  Per , last night she had jerking movements when attempting to go to  the restroom with assistance.  On exam this morning she had asterixis and required sternal rubbing to awaken her.  - Ammonia was ordered and was normal for the past 2 days, patient has not had a bowel movement in over a week per the patient's .  We will give lactulose enema regardless of ammonia level  - VBG ordered, personal interpretation reflects no evidence of hypercapnic respiratory failure  - blood cultures were collected yesterday x2, no growth today  - escalated patient's antibiotics from ceftriaxone and metronidazole to Zosyn given her increasing white blood cell count      Sepsis with acute liver failure without hepatic coma or septic shock  This patient does have evidence of infective focus  My overall impression is sepsis.  Source: Abdominal and potentially bacteremia  Antibiotics given-   Antibiotics (72h ago, onward)    Start     Stop Route Frequency Ordered    06/14/23 1100  piperacillin-tazobactam (ZOSYN) 4.5 g in dextrose 5 % in water (D5W) 5 % 100 mL IVPB (MB+)         -- IV Every 8 hours (non-standard times) 06/14/23 0959        Latest lactate reviewed-  Recent Labs   Lab 06/13/23  0813   LACTATE 1.3     Organ dysfunction indicated by Acute liver injury    Fluid challenge Not needed - patient is not hypotensive      Post- resuscitation assessment No - Post resuscitation assessment not needed       Will Not start Pressors- Levophed for MAP of 65  Source control achieved by: Zosyn and potential AES procedure on 6/15    Hyperbilirubinemia  Given worsening liver enzymes, alk-phos, bilirubin concern for ongoing obstructive process.  - [facility] consulted, in speaking with them plan for EUS of pancreas on 06/15 as patient did not have her Pletal held as well.  Apixaban held in preparation      History of DVT (deep vein thrombosis)  - hold home Eliquis      Type 2 diabetes mellitus with hyperosmolar hyperglycemic state (HHS)  IP HHS/DKA Pathway initiated. On presentation BG >700 and serum osmo 326 after  "1L IVFs. B-hydroxybutyrate 1.3. pH 7.4 and bicarb 34. AG 12. Patient was started on insulin drip on admission but later in the day, patient transitioned to Levemir 14 units at night and aspart 3 units with meals.  Started on diabetic diet  - patient currently on Levemir 20 units nightly, 10 units aspart with meals  - further insulin titration per endocrinology  - POC AC/HS      Acute recurrent pancreatitis  Lipase >2000 on presentation, downtrending without any identifiable cause for elevation. Patient without any abdominal pain. Discontinue HCTZ as it could potentially be cause of pancreatitis as well  - US abdomen with gallbladder sludge and "prominence" of the pancreatic duct   - liver enzymes, alk-phos, bilirubin elevated on 06/11, suggestive of possible choledocholithiasis as cause of pancreatitis.  MRCP ordered  - MRCP on my personal interpretation without any focal obstruction or retained stones, however liver enzymes continue to elevate  - AES was consulted for findings and after discussion, recommended ERCP/EUS once off apixaban and pletal        PAD (peripheral artery disease)  - hold pletal, ASA, and statin  - started on apixaban for PAD outpatient as well, holding in setting of upcoming procedure on 6/14      Asthma  - albuterol prn      PAPA (acute kidney injury)  Creatinine 1.7 on presentation ; baseline 1.0  - IVFs stopped, recurrent PAPA on 6/12 with FeNA indication pre-renal disease. 1L LR bolus ordered over 3 hrs  - renal function improved on 06/13, continuing IV fluids  - avoid nephrotoxic agents as appropriate  - continue to monitor      Hyperlipidemia associated with type 2 diabetes mellitus  Given rise in liver enzymes, discontinuing atorvastatin    Hypertension associated with diabetes  Discontinued all antihypertensive medications given worsening blood pressure and concerns for sepsis      VTE Risk Mitigation (From admission, onward)    None          Discharge Planning   REBECCA: 6/15/2023     Code " Status: Full Code   Is the patient medically ready for discharge?: No    Reason for patient still in hospital (select all that apply): Patient trending condition  Discharge Plan A: Home with family   Discharge Delays: None known at this time              Gregory Landers MD  Department of Hospital Medicine   [facility] - Telemetry Stepdown

## 2023-06-15 LAB
ALBUMIN SERPL BCP-MCNC: 1.7 G/DL (ref 3.5–5.2)
ALP SERPL-CCNC: 1014 U/L (ref 55–135)
ALT SERPL W/O P-5'-P-CCNC: 221 U/L (ref 10–44)
ANION GAP SERPL CALC-SCNC: 14 MMOL/L (ref 8–16)
AST SERPL-CCNC: 304 U/L (ref 10–40)
BASOPHILS # BLD AUTO: 0.02 K/UL (ref 0–0.2)
BASOPHILS NFR BLD: 0.2 % (ref 0–1.9)
BILIRUB SERPL-MCNC: 6.5 MG/DL (ref 0.1–1)
BUN SERPL-MCNC: 29 MG/DL (ref 8–23)
CALCIUM SERPL-MCNC: 9.1 MG/DL (ref 8.7–10.5)
CHLORIDE SERPL-SCNC: 100 MMOL/L (ref 95–110)
CO2 SERPL-SCNC: 22 MMOL/L (ref 23–29)
CREAT SERPL-MCNC: 2.5 MG/DL (ref 0.5–1.4)
DIFFERENTIAL METHOD: ABNORMAL
EOSINOPHIL # BLD AUTO: 0 K/UL (ref 0–0.5)
EOSINOPHIL NFR BLD: 0.1 % (ref 0–8)
ERYTHROCYTE [DISTWIDTH] IN BLOOD BY AUTOMATED COUNT: 13.7 % (ref 11.5–14.5)
EST. GFR  (NO RACE VARIABLE): 21.2 ML/MIN/1.73 M^2
GLUCOSE SERPL-MCNC: 287 MG/DL (ref 70–110)
HCT VFR BLD AUTO: 29.6 % (ref 37–48.5)
HGB BLD-MCNC: 8.9 G/DL (ref 12–16)
IMM GRANULOCYTES # BLD AUTO: 0.14 K/UL (ref 0–0.04)
IMM GRANULOCYTES NFR BLD AUTO: 1.2 % (ref 0–0.5)
LYMPHOCYTES # BLD AUTO: 0.7 K/UL (ref 1–4.8)
LYMPHOCYTES NFR BLD: 5.9 % (ref 18–48)
MAGNESIUM SERPL-MCNC: 2.3 MG/DL (ref 1.6–2.6)
MCH RBC QN AUTO: 26.2 PG (ref 27–31)
MCHC RBC AUTO-ENTMCNC: 30.1 G/DL (ref 32–36)
MCV RBC AUTO: 87 FL (ref 82–98)
MONOCYTES # BLD AUTO: 0.7 K/UL (ref 0.3–1)
MONOCYTES NFR BLD: 5.6 % (ref 4–15)
NEUTROPHILS # BLD AUTO: 10.4 K/UL (ref 1.8–7.7)
NEUTROPHILS NFR BLD: 87 % (ref 38–73)
NRBC BLD-RTO: 0 /100 WBC
PHOSPHATE SERPL-MCNC: 2.7 MG/DL (ref 2.7–4.5)
PLATELET # BLD AUTO: 224 K/UL (ref 150–450)
PMV BLD AUTO: 13 FL (ref 9.2–12.9)
POCT GLUCOSE: 161 MG/DL (ref 70–110)
POCT GLUCOSE: 188 MG/DL (ref 70–110)
POCT GLUCOSE: 216 MG/DL (ref 70–110)
POCT GLUCOSE: 294 MG/DL (ref 70–110)
POTASSIUM SERPL-SCNC: 3.7 MMOL/L (ref 3.5–5.1)
PROT SERPL-MCNC: 6 G/DL (ref 6–8.4)
RBC # BLD AUTO: 3.4 M/UL (ref 4–5.4)
SODIUM SERPL-SCNC: 136 MMOL/L (ref 136–145)
WBC # BLD AUTO: 11.89 K/UL (ref 3.9–12.7)

## 2023-06-15 PROCEDURE — 43274 ERCP DUCT STENT PLACEMENT: CPT | Performed by: INTERNAL MEDICINE

## 2023-06-15 PROCEDURE — D9220A PRA ANESTHESIA: Mod: ANES,,, | Performed by: ANESTHESIOLOGY

## 2023-06-15 PROCEDURE — 85025 COMPLETE CBC W/AUTO DIFF WBC: CPT | Performed by: FAMILY MEDICINE

## 2023-06-15 PROCEDURE — 99223 PR INITIAL HOSPITAL CARE,LEVL III: ICD-10-PCS | Mod: ,,, | Performed by: INTERNAL MEDICINE

## 2023-06-15 PROCEDURE — 37000009 HC ANESTHESIA EA ADD 15 MINS: Performed by: INTERNAL MEDICINE

## 2023-06-15 PROCEDURE — 25000003 PHARM REV CODE 250: Performed by: HOSPITALIST

## 2023-06-15 PROCEDURE — 63600175 PHARM REV CODE 636 W HCPCS: Performed by: NURSE ANESTHETIST, CERTIFIED REGISTERED

## 2023-06-15 PROCEDURE — 88112 CYTOPATH CELL ENHANCE TECH: CPT | Performed by: PATHOLOGY

## 2023-06-15 PROCEDURE — 37000008 HC ANESTHESIA 1ST 15 MINUTES: Performed by: INTERNAL MEDICINE

## 2023-06-15 PROCEDURE — 74328 X-RAY BILE DUCT ENDOSCOPY: CPT | Mod: TC | Performed by: INTERNAL MEDICINE

## 2023-06-15 PROCEDURE — 88112 PR  CYTOPATH, CELL ENHANCE TECH: ICD-10-PCS | Mod: 26,,, | Performed by: PATHOLOGY

## 2023-06-15 PROCEDURE — C1769 GUIDE WIRE: HCPCS | Performed by: INTERNAL MEDICINE

## 2023-06-15 PROCEDURE — 88112 CYTOPATH CELL ENHANCE TECH: CPT | Mod: 26,,, | Performed by: PATHOLOGY

## 2023-06-15 PROCEDURE — 27202125 HC BALLOON, EXTRACTION (ANY): Performed by: INTERNAL MEDICINE

## 2023-06-15 PROCEDURE — 27201674 HC SPHINCTERTOME: Performed by: INTERNAL MEDICINE

## 2023-06-15 PROCEDURE — 43264 PR ERCP,W/REMOVAL STONE,BIL/PANCR DUCTS: ICD-10-PCS | Mod: 51,,, | Performed by: INTERNAL MEDICINE

## 2023-06-15 PROCEDURE — 27000221 HC OXYGEN, UP TO 24 HOURS

## 2023-06-15 PROCEDURE — 83735 ASSAY OF MAGNESIUM: CPT | Performed by: FAMILY MEDICINE

## 2023-06-15 PROCEDURE — 99232 SBSQ HOSP IP/OBS MODERATE 35: CPT | Mod: ,,, | Performed by: INTERNAL MEDICINE

## 2023-06-15 PROCEDURE — D9220A PRA ANESTHESIA: ICD-10-PCS | Mod: ANES,,, | Performed by: ANESTHESIOLOGY

## 2023-06-15 PROCEDURE — C2617 STENT, NON-COR, TEM W/O DEL: HCPCS | Performed by: INTERNAL MEDICINE

## 2023-06-15 PROCEDURE — D9220A PRA ANESTHESIA: Mod: CRNA,,, | Performed by: NURSE ANESTHETIST, CERTIFIED REGISTERED

## 2023-06-15 PROCEDURE — 74328 PR  X-RAY FOR BILE DUCT ENDOSCOPY: ICD-10-PCS | Mod: 26,,, | Performed by: INTERNAL MEDICINE

## 2023-06-15 PROCEDURE — 99223 1ST HOSP IP/OBS HIGH 75: CPT | Mod: ,,, | Performed by: INTERNAL MEDICINE

## 2023-06-15 PROCEDURE — 25000003 PHARM REV CODE 250: Performed by: NURSE ANESTHETIST, CERTIFIED REGISTERED

## 2023-06-15 PROCEDURE — 27202127 HC STENT INTRODUCER: Performed by: INTERNAL MEDICINE

## 2023-06-15 PROCEDURE — 25000003 PHARM REV CODE 250: Performed by: STUDENT IN AN ORGANIZED HEALTH CARE EDUCATION/TRAINING PROGRAM

## 2023-06-15 PROCEDURE — 99233 SBSQ HOSP IP/OBS HIGH 50: CPT | Mod: ,,, | Performed by: HOSPITALIST

## 2023-06-15 PROCEDURE — 25000003 PHARM REV CODE 250: Performed by: INTERNAL MEDICINE

## 2023-06-15 PROCEDURE — 43274 PR ERCP W/STENT PLCMNT BILIARY/PANCREATIC DUCT: ICD-10-PCS | Mod: ,,, | Performed by: INTERNAL MEDICINE

## 2023-06-15 PROCEDURE — 74328 X-RAY BILE DUCT ENDOSCOPY: CPT | Mod: 26,,, | Performed by: INTERNAL MEDICINE

## 2023-06-15 PROCEDURE — 63600175 PHARM REV CODE 636 W HCPCS: Performed by: HOSPITALIST

## 2023-06-15 PROCEDURE — 94761 N-INVAS EAR/PLS OXIMETRY MLT: CPT

## 2023-06-15 PROCEDURE — 99232 PR SUBSEQUENT HOSPITAL CARE,LEVL II: ICD-10-PCS | Mod: ,,, | Performed by: INTERNAL MEDICINE

## 2023-06-15 PROCEDURE — 43259 PR ENDOSCOPIC ULTRASOUND EXAM: ICD-10-PCS | Mod: ,,, | Performed by: INTERNAL MEDICINE

## 2023-06-15 PROCEDURE — 43264 ERCP REMOVE DUCT CALCULI: CPT | Performed by: INTERNAL MEDICINE

## 2023-06-15 PROCEDURE — 84100 ASSAY OF PHOSPHORUS: CPT | Performed by: FAMILY MEDICINE

## 2023-06-15 PROCEDURE — D9220A PRA ANESTHESIA: ICD-10-PCS | Mod: CRNA,,, | Performed by: NURSE ANESTHETIST, CERTIFIED REGISTERED

## 2023-06-15 PROCEDURE — 43259 EGD US EXAM DUODENUM/JEJUNUM: CPT | Performed by: INTERNAL MEDICINE

## 2023-06-15 PROCEDURE — 36415 COLL VENOUS BLD VENIPUNCTURE: CPT | Performed by: FAMILY MEDICINE

## 2023-06-15 PROCEDURE — 99233 PR SUBSEQUENT HOSPITAL CARE,LEVL III: ICD-10-PCS | Mod: ,,, | Performed by: HOSPITALIST

## 2023-06-15 PROCEDURE — 80053 COMPREHEN METABOLIC PANEL: CPT | Performed by: STUDENT IN AN ORGANIZED HEALTH CARE EDUCATION/TRAINING PROGRAM

## 2023-06-15 PROCEDURE — 63600175 PHARM REV CODE 636 W HCPCS: Performed by: STUDENT IN AN ORGANIZED HEALTH CARE EDUCATION/TRAINING PROGRAM

## 2023-06-15 PROCEDURE — 25500020 PHARM REV CODE 255: Performed by: INTERNAL MEDICINE

## 2023-06-15 PROCEDURE — 43274 ERCP DUCT STENT PLACEMENT: CPT | Mod: ,,, | Performed by: INTERNAL MEDICINE

## 2023-06-15 PROCEDURE — 43259 EGD US EXAM DUODENUM/JEJUNUM: CPT | Mod: ,,, | Performed by: INTERNAL MEDICINE

## 2023-06-15 PROCEDURE — 20600001 HC STEP DOWN PRIVATE ROOM

## 2023-06-15 PROCEDURE — 43264 ERCP REMOVE DUCT CALCULI: CPT | Mod: 51,,, | Performed by: INTERNAL MEDICINE

## 2023-06-15 PROCEDURE — 27200946 HC BRUSH, CYTOLOGY: Performed by: INTERNAL MEDICINE

## 2023-06-15 RX ORDER — SODIUM CHLORIDE 0.9 % (FLUSH) 0.9 %
3 SYRINGE (ML) INJECTION EVERY 30 MIN PRN
Status: DISCONTINUED | OUTPATIENT
Start: 2023-06-15 | End: 2023-06-15 | Stop reason: HOSPADM

## 2023-06-15 RX ORDER — PHENYLEPHRINE HYDROCHLORIDE 10 MG/ML
INJECTION INTRAVENOUS
Status: DISCONTINUED | OUTPATIENT
Start: 2023-06-15 | End: 2023-06-15

## 2023-06-15 RX ORDER — INDOMETHACIN 50 MG/1
SUPPOSITORY RECTAL
Status: DISCONTINUED | OUTPATIENT
Start: 2023-06-15 | End: 2023-06-15 | Stop reason: HOSPADM

## 2023-06-15 RX ORDER — ONDANSETRON 2 MG/ML
4 INJECTION INTRAMUSCULAR; INTRAVENOUS DAILY PRN
Status: DISCONTINUED | OUTPATIENT
Start: 2023-06-15 | End: 2023-06-15 | Stop reason: HOSPADM

## 2023-06-15 RX ORDER — MIDODRINE HYDROCHLORIDE 5 MG/1
10 TABLET ORAL
Status: DISCONTINUED | OUTPATIENT
Start: 2023-06-15 | End: 2023-06-19

## 2023-06-15 RX ORDER — PROPOFOL 10 MG/ML
VIAL (ML) INTRAVENOUS
Status: DISCONTINUED | OUTPATIENT
Start: 2023-06-15 | End: 2023-06-15

## 2023-06-15 RX ORDER — SODIUM CHLORIDE 9 MG/ML
INJECTION, SOLUTION INTRAVENOUS CONTINUOUS
Status: ACTIVE | OUTPATIENT
Start: 2023-06-15 | End: 2023-06-15

## 2023-06-15 RX ORDER — INSULIN ASPART 100 [IU]/ML
4 INJECTION, SOLUTION INTRAVENOUS; SUBCUTANEOUS ONCE
Status: DISCONTINUED | OUTPATIENT
Start: 2023-06-15 | End: 2023-06-15

## 2023-06-15 RX ORDER — LIDOCAINE HYDROCHLORIDE 20 MG/ML
INJECTION INTRAVENOUS
Status: DISCONTINUED | OUTPATIENT
Start: 2023-06-15 | End: 2023-06-15

## 2023-06-15 RX ORDER — PROPOFOL 10 MG/ML
VIAL (ML) INTRAVENOUS CONTINUOUS PRN
Status: DISCONTINUED | OUTPATIENT
Start: 2023-06-15 | End: 2023-06-15

## 2023-06-15 RX ADMIN — PROPOFOL 50 MG: 10 INJECTION, EMULSION INTRAVENOUS at 03:06

## 2023-06-15 RX ADMIN — LIDOCAINE HYDROCHLORIDE 100 MG: 20 INJECTION INTRAVENOUS at 03:06

## 2023-06-15 RX ADMIN — INSULIN ASPART 2 UNITS: 100 INJECTION, SOLUTION INTRAVENOUS; SUBCUTANEOUS at 11:06

## 2023-06-15 RX ADMIN — PHENYLEPHRINE HYDROCHLORIDE 100 MCG: 10 INJECTION INTRAVENOUS at 03:06

## 2023-06-15 RX ADMIN — SODIUM CHLORIDE: 0.9 INJECTION, SOLUTION INTRAVENOUS at 02:06

## 2023-06-15 RX ADMIN — PIPERACILLIN AND TAZOBACTAM 4.5 G: 4; .5 INJECTION, POWDER, LYOPHILIZED, FOR SOLUTION INTRAVENOUS; PARENTERAL at 05:06

## 2023-06-15 RX ADMIN — SODIUM CHLORIDE 500 ML: 9 INJECTION, SOLUTION INTRAVENOUS at 09:06

## 2023-06-15 RX ADMIN — MIDODRINE HYDROCHLORIDE 10 MG: 5 TABLET ORAL at 09:06

## 2023-06-15 RX ADMIN — SODIUM CHLORIDE: 9 INJECTION, SOLUTION INTRAVENOUS at 12:06

## 2023-06-15 RX ADMIN — PIPERACILLIN SODIUM AND TAZOBACTAM SODIUM 4.5 G: 4; .5 INJECTION, POWDER, LYOPHILIZED, FOR SOLUTION INTRAVENOUS at 05:06

## 2023-06-15 RX ADMIN — PHENYLEPHRINE HYDROCHLORIDE 200 MCG: 10 INJECTION INTRAVENOUS at 03:06

## 2023-06-15 RX ADMIN — Medication 150 MCG/KG/MIN: at 03:06

## 2023-06-15 RX ADMIN — VANCOMYCIN HYDROCHLORIDE 1250 MG: 1.25 INJECTION, POWDER, LYOPHILIZED, FOR SOLUTION INTRAVENOUS at 01:06

## 2023-06-15 RX ADMIN — INSULIN ASPART 3 UNITS: 100 INJECTION, SOLUTION INTRAVENOUS; SUBCUTANEOUS at 08:06

## 2023-06-15 NOTE — TRANSFER OF CARE
"Anesthesia Transfer of Care Note    Patient: Mariann Huff    Procedure(s) Performed: Procedure(s) (LRB):  ULTRASOUND, UPPER GI TRACT, ENDOSCOPIC (N/A)  ERCP (ENDOSCOPIC RETROGRADE CHOLANGIOPANCREATOGRAPHY) (N/A)    Patient location: PACU    Anesthesia Type: general    Transport from OR: Transported from OR on 2-3 L/min O2 by NC with adequate spontaneous ventilation    Post pain: adequate analgesia    Post assessment: no apparent anesthetic complications and tolerated procedure well    Post vital signs: stable    Level of consciousness: awake, alert and oriented    Nausea/Vomiting: no nausea/vomiting    Complications: none    Transfer of care protocol was followed      Last vitals:   Visit Vitals  BP (!) 107/59(Patient Position: Lying)   Pulse 92   Temp 98   Resp 16   Ht 5' 4" (1.626 m)   Wt 62.8 kg (138 lb 7.2 oz)   LMP  (LMP Unknown)   SpO2 93%   Breastfeeding No   BMI 23.76 kg/m²     "

## 2023-06-15 NOTE — PROGRESS NOTES
"[facility] - Telemetry Stepdown  Endocrinology  Progress Note    Admit Date: 2023     63 year old  female with type 2 diabetes mellitus, CKD, CAD, hypertension, hyperlipidemia, MURTAZA (not on CPAP), PAD, Afib and hx of DVT on Eliquis and recent admission for pancreatitis who presents with HHS and developing possible biliary obstruction.     - Patient recently admitted  to  for acute pancreatitis attributed to Trulicity, which was discontinued upon d/c. Per pt's PCP this was 2nd episode of pancreatitis while on trulicity -- prior episode when increasing dose of Trulicity.   Pt states upon d/c  she was feeling slight improvement but still having intermittent n/v and not tolerating normal diet. N/V began to get worse in last few days which prompted admission. No abdominal pain on presentation for current admission, unlike last admit when she had severe abd pain for pancreatitis.    In the ED patient afebrile and hemodynamically stable saturating well on room air. Lipase >2000. BG >700 and serum osmo 326 after 1L IVFs. B-hydroxybutyrate 1.3. pH 7.4 and bicarb 34. AG 12. Patient started on aggressive IVFs and insulin gtt for HHS.     - Endocrinology consulted for initial HHS and glucose management      Regarding Type 2 Diabetes Mellitus:    - Initially diagnosed with Type 2 diabetes mellitus: pt not sure - states "long time ago"; at least as far back as  per review of elevated a1c in epic  - Home diabetic medications include: Farxiga 10mg qd, Glipizide ER 10mg daily, Levemir 20u qhs (but never started). She has been on MDI previously, most recently she was on Toujeo and Humalog discontinued 2021 but she states she was on insulin up until about 10mo ago.  - Family History: Not asked  - Weight based dosin kg x 0.4 (CKD) = 25 TDD x 0.5 = 12 basal / 12 prandial  - 1700/TDD = 68 (estimated insulin sensitivity factor)  - 450/TDD = 18 (estimated starting carb ratio for prandial " "dosing)    Lab Results   Component Value Date    HGBA1C 8.1 (H) 2023    HGBA1C 8.2 (H) 2023    HGBA1C 8.8 (H) 2023    CPEPTIDE 1.36 2017     Lab Results   Component Value Date    EGFRNORACEVR 21.2 (A) 06/15/2023    EGFRNORACEVR 27.7 (A) 2023    EGFRNORACEVR 51.2 (A) 2023       Interval HPI:   Overnight events:  No acute events reported overnight, did have hyperglycemia to the high 200s  Eatin%  Nausea: No  Hypoglycemia and intervention: No  Fever: No  TPN and/or TF: No  If yes, type of TF/TPN and rate: NA    BP (!) 81/48   Pulse 93   Temp 97.9 °F (36.6 °C)   Resp 10   Ht 5' 4" (1.626 m)   Wt 62.8 kg (138 lb 7.2 oz)   LMP  (LMP Unknown)   SpO2 95%   BMI 23.76 kg/m²     Labs Reviewed and Include    Recent Labs   Lab 06/15/23  0354   *   CALCIUM 9.1   ALBUMIN 1.7*   PROT 6.0      K 3.7   CO2 22*      BUN 29*   CREATININE 2.5*   ALKPHOS 1,014*   *   *   BILITOT 6.5*     Lab Results   Component Value Date    WBC 11.89 06/15/2023    HGB 8.9 (L) 06/15/2023    HCT 29.6 (L) 06/15/2023    MCV 87 06/15/2023     06/15/2023     No results for input(s): TSH, FREET4 in the last 168 hours.  Lab Results   Component Value Date    HGBA1C 8.1 (H) 2023       Nutritional status:   Body mass index is 23.76 kg/m².  Lab Results   Component Value Date    ALBUMIN 1.7 (L) 06/15/2023    ALBUMIN 1.9 (L) 2023    ALBUMIN 2.4 (L) 2023     No results found for: PREALBUMIN    Estimated Creatinine Clearance: 20.1 mL/min (A) (based on SCr of 2.5 mg/dL (H)).    Accu-Checks  Recent Labs     23  2117 23  0742 23  1206 23  1623 23  2033 23  0731 23  1144 23  1558 23  1952 06/15/23  0806   POCTGLUCOSE 156* 164* 172* 162* 139* 181* 199* 226* 242* 294*       Current Medications and/or Treatments Impacting Glycemic Control  Immunotherapy:    Immunosuppressants       None          Steroids:   Hormones " (From admission, onward)      Start     Stop Route Frequency Ordered    06/09/23 2028  melatonin tablet 6 mg         -- Oral Nightly PRN 06/09/23 1931          Pressors:    Autonomic Drugs (From admission, onward)      None          Hyperglycemia/Diabetes Medications:   Antihyperglycemics (From admission, onward)      Start     Stop Route Frequency Ordered    06/12/23 2100  insulin detemir U-100 (Levemir) pen 20 Units         -- SubQ Nightly 06/12/23 0904    06/10/23 1315  insulin regular in 0.9 % NaCl 100 unit/100 mL (1 unit/mL) infusion        Question:  Enter initial dose (Units/hr):  Answer:  0.6    06/10 2200 IV Continuous 06/10/23 1211    06/10/23 1309  insulin aspart U-100 pen 0-5 Units         -- SubQ As needed (PRN) 06/10/23 1211            ASSESSMENT and PLAN    Cardiac/Vascular  Hyperlipidemia associated with type 2 diabetes mellitus  - On Lipitor 40mg and Repatha outpatient  - Management per primary    Renal/  PAPA (acute kidney injury)  - Improved/resolved. Likely 2/2 IVVD 2/2 HHS on admission  - Management per primary    Endocrine  Type 2 diabetes mellitus with hyperosmolar hyperglycemic state (HHS)  - HHS on admission. Recent pancreatitis admission, not yet back to normal po intake with worsening hepatic function. Taking Glipizide and Farxiga at home. Never picked up Levemir Rx recently prescribed by her PCP  - 24 hour glucose trend: Worsened control in mid to high 200s after discontinuing mealtime insulin in response to poor appetite    - Noted plan for ERCP today  - Continue Levemir 20 units at night  - Once meals resume, restart aspart at lower dose of 7 units TIDAC+LDC until appetite improves    - If blood glucose greater than 300, please ask patient not to eat food or drink anything other than water until correctional insulin has brought it back below 250    GI  History of pancreatitis  - She has now had two episodes of pancreatitis on Trulicity, however suspicion for choledocholithiasis as  cause for pancreatitis. GI evaluating for worsening hepatic function  - Hold Trulicity at discharge        Mario Alberto Carroll DO  [facility], [address]th Floor  [address] [place], [place], [address]    Office: [phone]  Fax: [phone]    Disclaimer: This note has been generated using voice-recognition software. There may be typographical errors that have been missed during proof-reading.    The above history labs imaging impression and plan were discussed with attending physician who is in agreement and also took part in this patient's care.  I personally reviewed all of the patients available medications, labs, imaging, vitals, allergies, medical history.

## 2023-06-15 NOTE — ASSESSMENT & PLAN NOTE
-- PAPA likely ischemic ATN in s/o hemodynamic instability 2/2 sepsis with suspected intra-abdominal source  -- Cr up to 2.0 yesterday, which is when hypotension began, then 2.5 today from her baseline on ~ 1.0-1.3.   -- She is s/o 1 L of NS  -- Midodrine 10 mg TID was initiated today  -- Zosyn continued and undergoing EUS /ERCP today.   -- MRI with contrast on 6/11/23. No other contrasted studies or nephrotoxins noted.   -- 6/13 UA with trace protein, 4+ glu, 2+ ketones, trace blood, 1+ bili, few bacteria    Recommendations:  -- Would give another 1 L of LR today to complete 30 cc/kg fluid resuscitation per sepsis protocol  -- F/u repeat UA, urine Na, urine urea, urine Cr  -- Maintain hemodynamic stability  -- Possible source control per AES pending findings of study today  -- If not improving tomorrow, consider RP US  -- Avoid nephrotoxins  -- Renally dose medications

## 2023-06-15 NOTE — ANESTHESIA POSTPROCEDURE EVALUATION
Anesthesia Post Evaluation    Patient: Mariann Huff    Procedure(s) Performed: Procedure(s) (LRB):  ULTRASOUND, UPPER GI TRACT, ENDOSCOPIC (N/A)  ERCP (ENDOSCOPIC RETROGRADE CHOLANGIOPANCREATOGRAPHY) (N/A)    Final Anesthesia Type: general      Patient location during evaluation: GI PACU  Patient participation: Yes- Able to Participate  Level of consciousness: awake and alert and oriented  Post-procedure vital signs: reviewed and stable  Pain management: adequate  Airway patency: patent    PONV status at discharge: No PONV  Anesthetic complications: no      Cardiovascular status: blood pressure returned to baseline and hemodynamically stable  Respiratory status: unassisted, spontaneous ventilation and room air  Hydration status: euvolemic  Follow-up not needed.          Vitals Value Taken Time   BP 96/54 06/15/23 1731   Temp 36.7 °C (98.1 °F) 06/15/23 1645   Pulse 94 06/15/23 1741   Resp 16 06/15/23 1741   SpO2 99 % 06/15/23 1741   Vitals shown include unvalidated device data.      No case tracking events are documented in the log.      Pain/Derick Score: Derick Score: 8 (6/15/2023  5:15 PM)

## 2023-06-15 NOTE — CONSULTS
Critical Care Medicine Consult    Mariann Huff is a 63 year old woman with PMH CAD with GINGER , HTN, HLD, DM2, MURTAZA (not on CPAP), PAD, Afib and hx of DVT on Eliquis and recent admission for pancreatitis wo presented to the ED 6/9 with N/V and was admitted to hospital medicine for pancreatitis and HHS. Started on fluids for her pancreatitis. Initially on insulin gtt, transitioned to subcutaneous insulin on 6/10. On 6/11 developed hyperbilirubinemia and transaminitis, underwent MRCP which was unrevealing. Plan with GI was for upcoming ERCP out of concern for choledocholithiasis. Noted to be difficult to arouse per hospital medicine on 6/14 am which resolved spontaneously. CCM consulted for hypotension and somnolence on 6/15 am.     Per RR team, patient with swings in blood pressures over the course of the morning, as low as 70s/40s associated with decreased mentation. However, when patient wakes up her BP improves. On my exam at bedside patient was awake and her BP was in 130s/80s. She remains on insulin and zosyn. Midodrine started today for her BP. Bloodwork reviewed. LA and ammonia WNL. Leukocytosis resolved today. POCT glucose 216. AST and ALT slowly downtrending, ALP stable. T Bili increased from 5.9 to 6.5. BUN 29. Cr increased from 2 to 2.5, making urine. Bcx NGTD. Agree with plan for further GI eval, as well as fluids, abx, and midodrine.      Patient appears stable for the floor at this time, please reach out with any questions, concerns, or changes to her clinical status.     Argelia Monteiro MD  PCCM Fellow

## 2023-06-15 NOTE — NURSING TRANSFER
Nursing Transfer Note      6/15/2023     Reason patient is being transferred: postop    Transfer To: CT then 8097    Transfer via stretcher    Transfer with cardiac monitoring with pulse ox    Transported by escort    Telemetry: yes    Medicines sent: n/a    Any special needs or follow-up needed:     Chart send with patient: Yes    Notified:     Patient reassessed at: 6/15/23  1  Upon arrival to floor: bed in lowest position  Report given to Merline Aparicio

## 2023-06-15 NOTE — PROGRESS NOTES
Pharmacokinetic Initial Assessment: IV Vancomycin    Assessment/Plan:    Initiate intravenous vancomycin with loading dose of 1250mg mg once with subsequent doses when random concentrations are less than 20 mcg/mL  Desired empiric serum trough concentration is 15 to 20 mcg/mL  Draw vancomycin random level on 6/16 at 0030.  Pharmacy will continue to follow and monitor vancomycin.      Please contact pharmacy at extension [phone] with any questions regarding this assessment.     Thank you for the consult,   Kelly Mars       Patient brief summary:  Mariann Huff is a 63 y.o. female initiated on antimicrobial therapy with IV Vancomycin for treatment of suspected sepsis    Drug Allergies:   Review of patient's allergies indicates:   Allergen Reactions    Milk containing products (dairy)      Causes GI distress       Actual Body Weight:   62.8kg    Renal Function:   Estimated Creatinine Clearance: 25.2 mL/min (A) (based on SCr of 2 mg/dL (H)).,         CBC (last 72 hours):  Recent Labs   Lab Result Units 06/12/23  0338 06/13/23  0412 06/14/23  0354   WBC K/uL 9.49 19.85* 21.37*   Hemoglobin g/dL 11.6* 11.9* 9.9*   Hematocrit % 35.5* 37.5 31.5*   Platelets K/uL 278 282 238   Gran % % 67.3 88.9* 86.2*   Lymph % % 18.0 3.4* 3.1*   Mono % % 9.3 7.0 8.1   Eosinophil % % 4.3 0.0 0.0   Basophil % % 0.7 0.1 0.1   Differential Method  Automated Automated Automated       Metabolic Panel (last 72 hours):  Recent Labs   Lab Result Units 06/12/23  0338 06/12/23  0758 06/12/23  0916 06/12/23  1351 06/13/23  0412 06/13/23  0924 06/14/23  0354 06/14/23  2238   Sodium mmol/L 133* 136  --  135* 140  --  139 137   Sodium, Urine mmol/L  --   --  45  --   --   --   --   --    Potassium mmol/L 5.9* 4.2  --  4.0 3.6  --  4.3 3.9   Chloride mmol/L 98 98  --  97 98  --  102 99   CO2 mmol/L 18* 26  --  26 24  --  22* 23   Glucose mg/dL 245* 295*  --  272* 151*  --  170* 247*   Glucose, UA   --   --  4+*  --   --  4+*  --   --    BUN mg/dL  SEE COMMENT 28*  --  28* 23  --  19 28*   Creatinine mg/dL SEE COMMENT 1.6*  --  1.3 1.2  --  1.2 2.0*   Creatinine, Urine mg/dL  --   --  156.0  --   --   --   --   --    Albumin g/dL 2.6* 2.6*  --  2.6* 2.4*  --  1.9*  --    Total Bilirubin mg/dL SEE COMMENT 4.4*  --  5.2* 6.2*  --  5.9*  --    Alkaline Phosphatase U/L SEE COMMENT 577*  --  736* 1,010*  --  1,017*  --    AST U/L SEE COMMENT 195*  --  437* 555*  --  436*  --    ALT U/L 83* 100*  --  191* 309*  --  286*  --    Magnesium mg/dL SEE COMMENT  --   --  2.4 2.3  --  2.2 2.3   Phosphorus mg/dL 3.2  --   --  3.3 4.0  --  3.9  --        Drug levels (last 3 results):  No results for input(s): VANCOMYCINRA, VANCORANDOM, VANCOMYCINPE, VANCOPEAK, VANCOMYCINTR, VANCOTROUGH in the last 72 hours.    Microbiologic Results:  Microbiology Results (last 7 days)       Procedure Component Value Units Date/Time    Blood culture [[id number]] Collected: 06/13/23 0814    Order Status: Completed Specimen: Blood Updated: 06/14/23 1012     Blood Culture, Routine No Growth to date      No Growth to date    Narrative:      Lft hand    Blood culture [[id number]] Collected: 06/13/23 0815    Order Status: Completed Specimen: Blood Updated: 06/14/23 1012     Blood Culture, Routine No Growth to date      No Growth to date    Narrative:      Lft forearm    Blood culture [[id number]] Collected: 06/13/23 0815    Order Status: Sent Specimen: Blood Updated: 06/13/23 0816    Blood culture [[id number]] Collected: 06/13/23 0816    Order Status: Sent Specimen: Blood Updated: 06/13/23 0816

## 2023-06-15 NOTE — ASSESSMENT & PLAN NOTE
- HHS on admission. Recent pancreatitis admission, not yet back to normal po intake with worsening hepatic function. Taking Glipizide and Farxiga at home. Never picked up Levemir Rx recently prescribed by her PCP  - 24 hour glucose trend: Worsened control in mid to high 200s after discontinuing mealtime insulin in response to poor appetite    - Noted plan for ERCP today  - Continue Levemir 20 units at night  - Once meals resume, restart aspart at lower dose of 7 units TIDAC+LDC until appetite improves    - If blood glucose greater than 300, please ask patient not to eat food or drink anything other than water until correctional insulin has brought it back below 250

## 2023-06-15 NOTE — SUBJECTIVE & OBJECTIVE
Past Medical History:   Diagnosis Date    Anticoagulant long-term use     Asthma     Back pain     Bradycardia, unspecified 2019    The etiology of the bradycardic episode is unclear.  The have appear to be respiratory in origin (at least the 1st episode).  We will continue to monitor carefully.  We are awaiting evaluation by Cardiology.      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, with long-term current use of insulin 2020       Past Surgical History:   Procedure Laterality Date    ANTEGRADE NEPHROSTOGRAPHY Left 2019    Procedure: Nephrostogram - antegrade;  Surgeon: Robin Boyd MD;  Location: [facility] OR [address] [place] [address];  Service: Urology;  Laterality: Left;    BRONCHOSCOPY N/A 2019    Procedure: BRONCHOSCOPY;  Surgeon: Sean Ruano MD;  Location: [facility] OR [address] [place] [address];  Service: General;  Laterality: N/A;    CARDIAC CATHETERIZATION      CATARACT EXTRACTION      cataract extraction left eye      cataracts      CAUDAL EPIDURAL STEROID INJECTION N/A 2019    Procedure: Injection-steroid-epidural-caudal;  Surgeon: Dave Bentley Jr., MD;  Location: [facility] PAIN MGT;  Service: Pain Management;  Laterality: N/A;     SECTION, LOW TRANSVERSE      COLONOSCOPY N/A 2019    Procedure: COLONOSCOPY;  Surgeon: Al Alaniz MD;  [facility]  Service: Endoscopy;  Laterality: N/A;    CORONARY ANGIOPLASTY      CYSTOGRAM N/A  12/11/2019    Procedure: CYSTOGRAM INTRAOP;  Surgeon: Robin Boyd MD;  [facility];  Service: Urology;  Laterality: N/A;  1 HOUR    CYSTOSCOPY W/ RETROGRADES Left 12/11/2019    Procedure: CYSTOSCOPY, WITH RETROGRADE PYELOGRAM;  Surgeon: Robin Boyd MD;  [facility];  Service: Urology;  Laterality: Left;  fluro    ESOPHAGOGASTRODUODENOSCOPY W/ PEG  5/2/2019    Procedure: EGD, WITH PEG TUBE INSERTION;  Surgeon: Sean Ruano MD;  [facility];  Service: General;;    EXCISION TURBINATE, SUBMUCOUS      FLEXIBLE SIGMOIDOSCOPY N/A 5/13/2019    Procedure: SIGMOIDOSCOPY, FLEXIBLE;  Surgeon: ALBERTO Amin MD;  Location: [facility] ENDO (2ND FLR);  Service: Endoscopy;  Laterality: N/A;    FLEXIBLE SIGMOIDOSCOPY N/A 5/21/2019    Procedure: SIGMOIDOSCOPY, FLEXIBLE;  Surgeon: ALBERTO Amin MD;  Location: [facility] ENDO ([facility] FLR);  Service: Endoscopy;  Laterality: N/A;    FUSION OF LUMBAR SPINE BY ANTERIOR APPROACH Left 4/12/2019    Procedure: FUSION, SPINE, LUMBAR, ANTERIOR APPROACH Left L5-S1 Anterior to Psoa Interbody Fusion, L5-S1 Posterior Instrumentation;  Surgeon: Mk George MD;  Location: [address];  Service: Neurosurgery;  Laterality: Left;  Porcedure:  Left L5-S1 Anterior to Psoa Interbody Fusion, L5-S1 Posterior Instrumentation  Surgery Time: 4 Hrs  LOS: 2-3  Anesthesia: General   Blood: Type & Screen  R    HAND SURGERY Left     HAND SURGERY Right     torn ligament repair/ Dr. Yeboah    HYSTERECTOMY      INJECTION OF STEROID Right 12/10/2020    Procedure: INJECTION, STEROID Right SI Joint Block and Steroid Injection;  Surgeon: Mk George MD;  Location: [facility];  Service: Neurosurgery;  Laterality: Right;  Procedure: Right SI Joint Block and Steroid Injection   SUrgery Time: 30 Min  LOS: 0  Anesthesia: MAC  Radiology: C-arm  Bed: [place] [id number] Poster  Position: Prone    INJECTION OF STEROID Right 9/28/2021    Procedure: INJECTION, STEROID Right SI joint  block & steroid injection;  Surgeon: Mk George MD;  Location: [facility] OR;  Service: Neurosurgery;  Laterality: Right;  Procedure: Right SI joint block & steroid injection  Surgery Time: 30m  Anesthesia: Local MAC  Radiology: C-arm  Bed: Regular  Position: Prone    left foot surgery      left wrist surgery      LYSIS OF ADHESIONS N/A 2/19/2020    Procedure: LYSIS, ADHESIONS;  Surgeon: Robin Boyd MD;  [facility];  Service: Urology;  Laterality: N/A;    NASAL SEPTUM SURGERY  5/7/15    PERCUTANEOUS NEPHROSTOMY Left 4/21/2019    Procedure: Creation, Nephrostomy, Percutaneous;  Surgeon: Karina Surgeon;  Location: [facility];  Service: Anesthesiology;  Laterality: Left;    REPAIR OF URETER  4/12/2019    Procedure: REPAIR, URETER;  Surgeon: Mk George MD;  [address]  Service: Neurosurgery;;    REPLACEMENT OF NEPHROSTOMY TUBE N/A 7/18/2019    Procedure: REPLACEMENT, NEPHROSTOMY TUBE;  Surgeon: [facility] Diagnostic Provider;  [address]  Service: Anesthesiology;  Laterality: N/A;  188    REPLACEMENT OF NEPHROSTOMY TUBE N/A 7/24/2019    Procedure: REPLACEMENT, NEPHROSTOMY TUBE;  Surgeon: [facility] Diagnostic Provider;  [facility];  Service: Anesthesiology;  Laterality: N/A;  188    REPLACEMENT OF NEPHROSTOMY TUBE N/A 10/7/2019    Procedure: REPLACEMENT, NEPHROSTOMY TUBE;  Surgeon: [facility] Diagnostic Provider;  [facility];  Service: Anesthesiology;  Laterality: N/A;  189    REPLACEMENT OF NEPHROSTOMY TUBE N/A 11/25/2019    Procedure: REPLACEMENT, NEPHROSTOMY TUBE;  Surgeon: [facility] Diagnostic Provider;  [facility];  Service: Anesthesiology;  Laterality: N/A;  Room 188/Bessy    REPLACEMENT OF NEPHROSTOMY TUBE Right 2/19/2020    Procedure: REPLACEMENT, NEPHROSTOMY TUBE removal removal;  Surgeon: Robin Boyd MD;  [facility];  Service: Urology;  Laterality: Right;    rt elbow surgery      S/P LAD COATED STENT  05/14/2010    6  total     S/P OM1 STENT  08/2003    SINUS SURGERY      F.E.S.S.    TRACHEOSTOMY N/A 5/2/2019    Procedure: CREATION, TRACHEOSTOMY;  Surgeon: Sean Ruano MD;  Location: [facility] OR [address] [place] [address];  Service: General;  Laterality: N/A;    TUBAL LIGATION      URETERAL REIMPLANTION Left 2/19/2020    Procedure: REIMPLANTATION, URETER WITH PSOAS HITCH;  Surgeon: Robin Boyd MD;  Location: [facility] OR [address] [place] [address];  Service: Urology;  Laterality: Left;       Review of patient's allergies indicates:   Allergen Reactions    Milk containing products (dairy)      Causes GI distress     Current Facility-Administered Medications   Medication Frequency    acetaminophen tablet 650 mg Q4H PRN    albuterol inhaler 2 puff Q6H PRN    dextrose 10% bolus 125 mL 125 mL PRN    dextrose 10% bolus 250 mL 250 mL PRN    dextrose 40 % gel 15,000 mg PRN    dextrose 40 % gel 30,000 mg PRN    famotidine tablet 20 mg Daily    glucagon (human recombinant) injection 1 mg PRN    insulin aspart U-100 pen 0-5 Units PRN    insulin detemir U-100 (Levemir) pen 20 Units QHS    melatonin tablet 6 mg Nightly PRN    midodrine tablet 10 mg TID WM    ondansetron injection 4 mg Q6H PRN    piperacillin-tazobactam (ZOSYN) 4.5 g in dextrose 5 % in water (D5W) 5 % 100 mL IVPB (MB+) Q12H    prochlorperazine injection Soln 5 mg Q6H PRN    sodium chloride 0.9% flush 10 mL PRN    vancomycin - pharmacy to dose pharmacy to manage frequency     Facility-Administered Medications Ordered in Other Encounters   Medication Frequency    0.9%  NaCl infusion Continuous    LIDOcaine (cardiac) injection PRN    PHENYLephrine injection PRN    propofol (DIPRIVAN) 10 mg/mL infusion PRN    propofol (DIPRIVAN) 10 mg/mL infusion Continuous PRN    sodium chloride 0.9% infusion Continuous PRN     Family History       Problem Relation (Age of Onset)    Diabetes Mother, Father, Sister, Brother, Sister    Heart attack Father    Heart disease Mother    Leukemia Father    No Known Problems Sister, Brother,  Brother, Maternal Grandmother, Maternal Grandfather, Paternal Grandmother, Paternal Grandfather, Son, Son, Maternal Aunt, Maternal Uncle, Paternal Aunt, Paternal Uncle          Tobacco Use    Smoking status: Never    Smokeless tobacco: Never   Substance and Sexual Activity    Alcohol use: No     Alcohol/week: 0.0 standard drinks    Drug use: No    Sexual activity: Yes     Partners: Male     Birth control/protection: Post-menopausal     Comment:      Review of Systems   Constitutional:  Positive for activity change. Negative for chills and fever.   HENT:  Negative for sore throat.    Respiratory:  Negative for cough and shortness of breath.    Cardiovascular:  Negative for chest pain and leg swelling.   Gastrointestinal:  Positive for nausea and vomiting. Negative for abdominal pain, constipation and diarrhea.   Genitourinary:  Negative for dysuria.   Musculoskeletal:  Negative for myalgias.   Skin:  Negative for rash.   Neurological:  Negative for dizziness and headaches.   Psychiatric/Behavioral:  Positive for confusion.    Objective:     Vital Signs (Most Recent):  Temp: 98.6 °F (37 °C) (06/15/23 1232)  Pulse: 107 (06/15/23 1232)  Resp: 16 (06/15/23 1232)  BP: (!) 129/58 (06/15/23 1232)  SpO2: 97 % (3L NC) (06/15/23 1232) Vital Signs (24h Range):  Temp:  [97.9 °F (36.6 °C)-99.2 °F (37.3 °C)] 98.6 °F (37 °C)  Pulse:  [] 107  Resp:  [10-27] 16  SpO2:  [89 %-100 %] 97 %  BP: ()/(39-67) 129/58     Weight: 62.8 kg (138 lb 7.2 oz) (06/09/23 2311)  Body mass index is 23.76 kg/m².  Body surface area is 1.68 meters squared.    I/O last 3 completed shifts:  In: 50 [P.O.:50]  Out: 400 [Urine:400]     Physical Exam  Constitutional:       Appearance: Normal appearance.   HENT:      Head: Normocephalic and atraumatic.      Mouth/Throat:      Mouth: Mucous membranes are moist.      Pharynx: Oropharynx is clear.   Eyes:      Extraocular Movements: Extraocular movements intact.      Pupils: Pupils are equal,  round, and reactive to light.   Cardiovascular:      Rate and Rhythm: Normal rate and regular rhythm.      Pulses: Normal pulses.      Heart sounds: Normal heart sounds.   Pulmonary:      Effort: Pulmonary effort is normal. No respiratory distress.      Breath sounds: Normal breath sounds.   Abdominal:      General: Abdomen is flat. Bowel sounds are normal.      Palpations: Abdomen is soft.   Musculoskeletal:         General: No swelling.      Cervical back: Neck supple.   Skin:     General: Skin is warm and dry.      Capillary Refill: Capillary refill takes less than 2 seconds.   Neurological:      General: No focal deficit present.      Mental Status: She is alert and oriented to person, place, and time. Mental status is at baseline.   Psychiatric:         Mood and Affect: Mood normal.         Behavior: Behavior normal.         Thought Content: Thought content normal.         Judgment: Judgment normal.        Significant Labs:  All labs within the past 24 hours have been reviewed.    Significant Imaging:  Reviewed

## 2023-06-15 NOTE — CARE UPDATE
RAPID RESPONSE NURSE FOLLOW-UP NOTE       Followed up with patient for proactive rounding.  Patient noted to have recurrent hypotension when sleeping.  Dr. Treadwell contacted.  Critical care consulted.    Upon arrival to room, patient awake and BP stable.  Dr. Monteiro at bedside to assess patient.  Reviewed plan of care with Bedside RNMerline .   Team will continue to follow.  Please call Rapid Response RN, Hilaria Curran RN with any questions or concerns at [phone].

## 2023-06-15 NOTE — H&P
Short Stay Endoscopy History and Physical    PCP - Jasbir Haney MD  Referring Physician - Aaareferral Self  No address on file    Procedure - EUS and ERCP  ASA - per anesthesia  Mallampati - per anesthesia  History of Anesthesia problems - per anesthesia  Family history Anesthesia problems -  per anesthesia   Plan of anesthesia - per anesthesia    HPI:  This is a 63 y.o. female here for evaluation of: EUS and ERCP for recurrent acute pancreatitis with jaundice, hyperbilirubinemia, altered mental status, suspected cholangitis; recent imaging detected gallbladder sludge so will require discussion with surgery to consider cholecystectomy in setting of recurrent pancreatitis.     Reflux - no  Dysphagia - no  Abdominal pain - mild  Diarrhea - no    ROS:  Constitutional: No fevers, chills, No weight loss  CV: No chest pain  Pulm: No cough, No shortness of breath  Ophtho: No vision changes  GI: see HPI  Derm: No rash    Medical History:  has a past medical history of Anticoagulant long-term use, Asthma, Back pain, Bradycardia, unspecified (2019), CAD (coronary artery disease), Carotid artery stenosis, Chronic combined systolic and diastolic CHF (congestive heart failure) (2019), Diabetes mellitus type 2 in obese, HTN (hypertension), benign, Hyperlipidemia, Keloid cicatrix, NSTEMI (non-ST elevated myocardial infarction), Nuclear sclerosis - Right Eye (3/18/2014), Primary localized osteoarthrosis, lower leg (2014), Senile nuclear sclerosis (2015), Sleep apnea, and 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    Surgical History:  has a past surgical history that includes left foot surgery; left wrist surgery; rt elbow surgery;  section, low transverse; Tubal ligation; cataract extraction left eye; cataracts; S/P OM1 STENT (2003); S/P LAD COATED STENT (2010); Cardiac  catheterization; Coronary angioplasty; Hysterectomy; Hand surgery (Left); Excision turbinate, submucous; Sinus surgery; Nasal septum surgery (5/7/15); Hand surgery (Right); Caudal epidural steroid injection (N/A, 2/7/2019); Fusion of lumbar spine by anterior approach (Left, 4/12/2019); Repair of ureter (4/12/2019); Percutaneous nephrostomy (Left, 4/21/2019); Tracheostomy (N/A, 5/2/2019); Bronchoscopy (N/A, 5/2/2019); Esophagogastroduodenoscopy w/ PEG (5/2/2019); Flexible sigmoidoscopy (N/A, 5/13/2019); Flexible sigmoidoscopy (N/A, 5/21/2019); Colonoscopy (N/A, 5/24/2019); Replacement of nephrostomy tube (N/A, 7/18/2019); Replacement of nephrostomy tube (N/A, 7/24/2019); Replacement of nephrostomy tube (N/A, 10/7/2019); Replacement of nephrostomy tube (N/A, 11/25/2019); Cataract extraction; Cystogram (N/A, 12/11/2019); Antegrade nephrostography (Left, 12/11/2019); Cystoscopy w/ retrogrades (Left, 12/11/2019); Lysis of adhesions (N/A, 2/19/2020); Ureteral reimplantion (Left, 2/19/2020); Replacement of nephrostomy tube (Right, 2/19/2020); Injection of steroid (Right, 12/10/2020); and Injection of steroid (Right, 9/28/2021).    Family History: family history includes Diabetes in her brother, father, mother, sister, and sister; Heart attack in her father; Heart disease in her mother; Leukemia in her father; No Known Problems in her brother, brother, maternal aunt, maternal grandfather, maternal grandmother, maternal uncle, paternal aunt, paternal grandfather, paternal grandmother, paternal uncle, sister, son, and son..    Social History:  reports that she has never smoked. She has never used smokeless tobacco. She reports that she does not drink alcohol and does not use drugs.    Review of patient's allergies indicates:   Allergen Reactions    Milk containing products (dairy)      Causes GI distress       Medications:   Medications Prior to Admission   Medication Sig Dispense Refill Last Dose    amLODIPine (NORVASC) 10  MG tablet Take 1 tablet (10 mg total) by mouth once daily. 90 tablet 2 2023    [] apixaban (ELIQUIS) 2.5 mg Tab Take 1 tablet (2.5 mg total) by mouth 2 (two) times daily. 180 tablet 1     aspirin 81 mg Tab Take 81 mg by mouth once daily. 1 Tablet Oral Every day       atorvastatin (LIPITOR) 40 MG tablet Take 1 tablet (40 mg total) by mouth every evening. 90 tablet 3 2023    dapagliflozin (FARXIGA) 10 mg tablet Take 1 tablet (10 mg total) by mouth once daily. 90 tablet 3     diclofenac sodium (VOLTAREN) 1 % Gel Apply 2 g topically 3 (three) times daily. Apply to the area of pain 2-3x per day or night as needed 100 g 3     EScitalopram oxalate (LEXAPRO) 10 MG tablet Take 1 tablet (10 mg total) by mouth once daily. 90 tablet 2     [] eszopiclone (LUNESTA) 2 MG Tab Take 1 tablet (2 mg total) by mouth every evening. 30 tablet 0     [] evolocumab (REPATHA SURECLICK) 140 mg/mL PnIj Inject 1 mL (140 mg total) into the skin every 14 (fourteen) days. 2 mL 6     gabapentin (NEURONTIN) 300 MG capsule Take 1 capsule (300 mg) in the morning and 2 capsules (600 mg) in the evening 90 capsule 0     glipiZIDE (GLUCOTROL) 10 MG TR24 Take 1 tablet (10 mg total) by mouth daily with breakfast. 90 tablet 3     isosorbide mononitrate (ISMO,MONOKET) 10 mg tablet Take 1 tablet (10 mg total) by mouth once daily. 90 tablet 2     telmisartan (MICARDIS) 80 MG Tab Take 1 tablet (80 mg total) by mouth once daily. Stop losartan 90 tablet 2     [DISCONTINUED] hydroCHLOROthiazide (HYDRODIURIL) 12.5 MG Tab Take 1 tablet (12.5 mg total) by mouth once daily. 90 tablet 3     ACCU-CHEK EDIN PLUS METER Misc        albuterol (PROVENTIL/VENTOLIN HFA) 90 mcg/actuation inhaler Inhale 2 puffs into the lungs every 6 (six) hours as needed for Wheezing. 1 g 3     blood sugar diagnostic (ACCU-CHEK EDIN PLUS TEST STRP) Strp TEST BLOOD SUGARS 4 TIMES DAILY 150 strip 11     cilostazoL (PLETAL) 100 MG Tab Take 1 tablet (100 mg total) by  "mouth 2 (two) times daily. (Patient not taking: Reported on 6/9/2023) 60 tablet 11 Not Taking    insulin detemir U-100, Levemir, (LEVEMIR FLEXPEN) 100 unit/mL (3 mL) InPn pen Inject 20 Units into the skin every evening. 18 mL 3     multivitamin (THERAGRAN) per tablet Take 1 tablet by mouth once daily.       pen needle, diabetic (NOVOTWIST) 32 gauge x 1/5" Ndle Use 1 needle to inject insulin four times daily 400 each 2        Physical Exam:    Vital Signs:   Vitals:    06/15/23 1232   BP: (!) 129/58   Pulse: 107   Resp: 16   Temp: 98.6 °F (37 °C)       General Appearance: Confused but alert, jaundiced, benign abdominal exam.    Labs:  Lab Results   Component Value Date    WBC 11.89 06/15/2023    HGB 8.9 (L) 06/15/2023    HCT 29.6 (L) 06/15/2023     06/15/2023    CHOL 131 05/25/2023    TRIG 65 06/13/2023    HDL 45 05/25/2023     (H) 06/15/2023     (H) 06/15/2023     06/15/2023    K 3.7 06/15/2023     06/15/2023    CREATININE 2.5 (H) 06/15/2023    BUN 29 (H) 06/15/2023    CO2 22 (L) 06/15/2023    TSH 1.230 10/25/2022    INR 1.2 06/13/2023    GLUF 217 (H) 09/15/2014    HGBA1C 8.1 (H) 05/25/2023       I have explained the risks and benefits of this endoscopic procedure to the patient including but not limited to bleeding, inflammation, infection, perforation, pancreatitis, missing a lesion and death.      Lisa Kim MD    " no acute ST segment changes

## 2023-06-15 NOTE — TREATMENT PLAN
Post-Procedure Gastroenterology Treatment Plan:     Mariann Huff is status post EUS/ERCP with the following findings:     EUS:   -Inflammation in the duodenal bulb likely from underlying acute pancreatitis.   -Gallbladder sludge.   -Intra-pancreatic bile duct stricture with mild dilation of the main pancreatic duct; likely from acute pancreatitis.     ERCP:   -Localized biliary stricture in the lower third of the main bile duct; likely inflammatory in origin from acute pancreatitis.   -Sphincterotomy performed; large amount of gallbladder sludge noted.   -Biliary tree was swept and cells for cytology obtained.   -Plastic stent placed into the common bile duct.   -Indomethacin given to decrease risk of post-ERCP pancreatitis.     Our recommendations are as follows:     -Okay to resume diet.   -Etiology of current episode of pancreatitis secondary to large amount of gallbladder sludge; recommend consideration for cholecystectomy prior to discharge.   -Resume Apixaban and Pletal in 3 days.   -Repeat ERCP in 6-8 weeks to remove stent; GI clinic will arrange.         Vijay Bonilla MD, PGY-V  Gastroenterology Fellow  [facility]

## 2023-06-15 NOTE — CARE UPDATE
RAPID RESPONSE NURSE FOLLOW-UP NOTE       Followed up with patient for proactive rounding.  Persistent hypoTN despite receiving a total of 1500ml LR boluses this shift. Pt's ABX escalated and NS gtt infusing at 200ml/hr. Per MD [place] SBP goal>90. Reviewed plan of care with Shazia.   Team will continue to follow.  Please call Rapid Response RN, Yvette Salomon RN with any questions or concerns at [phone].

## 2023-06-15 NOTE — CARE UPDATE
RAPID RESPONSE NURSE PROACTIVE ROUNDING NOTE       Time of Visit:     Admit Date: 2023  LOS: 5  Code Status: Full Code   Date of Visit: 2023  : 1960  Age: 63 y.o.  Sex: female  Race: Black or   Bed: 8097/8097 A:   MRN: [id number]  Was the patient discharged from an ICU this admission? No   Was the patient discharged from a PACU within last 24 hours? No   Did the patient receive conscious sedation/general anesthesia in last 24 hours? No  Was the patient in the ED within the past 24 hours? No  Was the patient on NIPPV within the past 24 hours? No   Attending Physician: Gregory Landers MD  Primary Service: [facility] MED X   Time spent at the bedside: 15 -30 min    SITUATION    Notified by charge RN via phone call.  Reason for alert: Hypotension  Called to evaluate the patient for Circulatory    BACKGROUND     Why is the patient in the hospital?: Acute encephalopathy    Patient has a past medical history of Anticoagulant long-term use, Asthma, Back pain, Bradycardia, unspecified, CAD (coronary artery disease), Carotid artery stenosis, Chronic combined systolic and diastolic CHF (congestive heart failure), Diabetes mellitus type 2 in obese, HTN (hypertension), benign, Hyperlipidemia, Keloid cicatrix, NSTEMI (non-ST elevated myocardial infarction), Nuclear sclerosis - Right Eye, Primary localized osteoarthrosis, lower leg, Senile nuclear sclerosis, Sleep apnea, and 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    Last Vitals:  Temp: 99.1 °F (37.3 °C) (1556)  Pulse: 106 (2059)  Resp: 18 (1556)  BP: 117/57 (2059)  SpO2: 96 % (1556)    24 Hours Vitals Range:  Temp:  [96 °F (35.6 °C)-99.5 °F (37.5 °C)]   Pulse:  []   Resp:  [18-20]   BP: ()/(50-67)   SpO2:  [90 %-100 %]     Labs:  Recent Labs     23  0338 23  0412  06/14/23  0354   WBC 9.49 19.85* 21.37*   HGB 11.6* 11.9* 9.9*   HCT 35.5* 37.5 31.5*    282 238       Recent Labs     06/12/23  1351 06/13/23  0412 06/14/23  0354   * 140 139   K 4.0 3.6 4.3   CL 97 98 102   CO2 26 24 22*   CREATININE 1.3 1.2 1.2   * 151* 170*   PHOS 3.3 4.0 3.9   MG 2.4 2.3 2.2        Recent Labs     06/14/23  0943   PH 7.389   PCO2 44.0   PO2 55   HCO3 26.6   POCSATURATED 87*   BE 2        ASSESSMENT    Physical Exam  Constitutional:       Appearance: She is ill-appearing.   Cardiovascular:      Rate and Rhythm: Regular rhythm. Tachycardia present.   Pulmonary:      Effort: Pulmonary effort is normal.   Neurological:      Mental Status: She is easily aroused. She is disoriented.      GCS: GCS eye subscore is 4. GCS verbal subscore is 4. GCS motor subscore is 6.      Motor: Weakness present.     Per Family at bedside and Pt's bedside RN, Shazia, Pt's mentation is improved since receiving 1L LR bolus. Initial improvement in pressure 78/45 to 117/57 (82), with recurrent hypotension after completion of bolus. 85/49 (62).    INTERVENTIONS  - 1L LR bolus prior to arrival.     The patient was seen for Cardiac problem. Staff concerns included hypotension. The following interventions were performed: BMP, Magnesium, and normal saline 500 ml IV bolus . Lactic acid.    RECOMMENDATIONS  Bed rest to avoid repeat event of weakness while walking from the bathroom. Completion of an additional 500ml LR bolus, monitor mentation. Septic w/u in setting of worsening leukocytosis.       PROVIDER ESCALATION    Yes/No  Yes    Orders received and case discussed with Dr. Horton .    Disposition: Remain in room 8097.    FOLLOW-UP    charge RNZee, ad bedside, GOPAL Mccray  updated on plan of care. Instructed to call the Rapid Response Nurse, Yvette Salomon RN at [phone] for additional questions or concerns.

## 2023-06-15 NOTE — NURSING
0700 - Report received  0730 - Patient in trendelenberg position to maintain SBP >90, VSS per flowsheets. Patient with snoring respirations, arouses to stimulation and can answer name and date of birth only with 3x prompts to do so. Immediately falls back asleep. IVF infusing at 200 ml/hr  0800 - RRT on phone for spO2 concerns, HOB elevated but patients bp dropped to 70's systolic. Head lowered again with improvement in BP. 5L NC. , 3u insulin given per sliding scale.   0845 - RRT at bedside, patient woken up with improvements in sat and BP. Able to answer date and location. HOB elevated again with closer watch on bp  1000 - RRT and CC at bedside, patient is awake and oriented.  1200 - Transport here to take patient to [facility], connected to tele box #[id number]

## 2023-06-15 NOTE — PROVATION PATIENT INSTRUCTIONS
Discharge Summary/Instructions after an Endoscopic Procedure  Patient Name: Mariann Huff  Patient MRN: [id number]  Patient YOB: 1960  Thursday, Cherie 15, 2023  Lisa Kim MD  Dear patient,  As a result of recent federal legislation (The Federal Cures Act), you may   receive lab or pathology results from your procedure in your [facility]   account before your physician is able to contact you. Your physician or   their representative will relay the results to you with their   recommendations at their soonest availability.  Thank you,  RESTRICTIONS:  During your procedure today, you received medications for sedation.  These   medications may affect your judgment, balance and coordination.  Therefore,   for 24 hours, you have the following restrictions:   - DO NOT drive a car, operate machinery, make legal/financial decisions,   sign important papers or drink alcohol.    ACTIVITY:  Today: no heavy lifting, straining or running due to procedural   sedation/anesthesia.  The following day: return to full activity including work.  DIET:  Eat and drink normally unless instructed otherwise.     TREATMENT FOR COMMON SIDE EFFECTS:  - Mild abdominal pain, nausea, belching, bloating or excessive gas:  rest,   eat lightly and use a heating pad.  - Sore Throat: treat with throat lozenges and/or gargle with warm salt   water.  - Because air was used during the procedure, expelling large amounts of air   from your rectum or belching is normal.  - If a bowel prep was taken, you may not have a bowel movement for 1-3 days.    This is normal.  SYMPTOMS TO WATCH FOR AND REPORT TO YOUR PHYSICIAN:  1. Abdominal pain or bloating, other than gas cramps.  2. Chest pain.  3. Back pain.  4. Signs of infection such as: chills or fever occurring within 24 hours   after the procedure.  5. Rectal bleeding, which would show as bright red, maroon, or black stools.   (A tablespoon of blood from the rectum is not serious, especially if    hemorrhoids are present.)  6. Vomiting.  7. Weakness or dizziness.  GO DIRECTLY TO THE NEAREST EMERGENCY ROOM IF YOU HAVE ANY OF THE FOLLOWING:      Difficulty breathing              Chills and/or fever over 101 F   Persistent vomiting and/or vomiting blood   Severe abdominal pain   Severe chest pain   Black, tarry stools   Bleeding- more than one tablespoon   Any other symptom or condition that you feel may need urgent attention  Your doctor recommends these additional instructions:  If any biopsies were taken, your doctors clinic will contact you in 1 to 2   weeks with any results.  - [facility].   - Resume previous diet.   - Refer to surgery to consider cholecystectomy in setting of likely biliary   pancreatitis.  - Perform an ERCP today.   - Return to referring physician.  For questions, problems or results please call your physician - Lisa Kim MD at Work:  [phone].  [facility], EMERGENCY ROOM PHONE NUMBER: [phone]  IF A COMPLICATION OR EMERGENCY SITUATION ARISES AND YOU ARE UNABLE TO REACH   YOUR PHYSICIAN - GO DIRECTLY TO THE EMERGENCY ROOM.  Lisa Kim MD  6/15/2023 4:39:26 PM  This report has been verified and signed electronically.  Dear patient,  As a result of recent federal legislation (The Federal Cures Act), you may   receive lab or pathology results from your procedure in your [facility]   account before your physician is able to contact you. Your physician or   their representative will relay the results to you with their   recommendations at their soonest availability.  Thank you,  PROVATION

## 2023-06-15 NOTE — AI DETERIORATION ALERT
"RAPID RESPONSE NURSE AI ALERT       AI alert received.    Chart Reviewed: 06/15/2023, 8:08 AM    MRN: [id number]  Bed: 8097/8097 A    Dx: Acute encephalopathy    Mariann Huff has a past medical history of Anticoagulant long-term use, Asthma, Back pain, Bradycardia, unspecified, CAD (coronary artery disease), Carotid artery stenosis, Chronic combined systolic and diastolic CHF (congestive heart failure), Diabetes mellitus type 2 in obese, HTN (hypertension), benign, Hyperlipidemia, Keloid cicatrix, NSTEMI (non-ST elevated myocardial infarction), Nuclear sclerosis - Right Eye, Primary localized osteoarthrosis, lower leg, Senile nuclear sclerosis, Sleep apnea, and 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    Last VS: BP (!) 141/67   Pulse 104   Temp 97.9 °F (36.6 °C)   Resp 18   Ht 5' 4" (1.626 m)   Wt 62.8 kg (138 lb 7.2 oz)   LMP  (LMP Unknown)   SpO2 (!) 94%   BMI 23.76 kg/m²     24H Vital Sign Range:  Temp:  [97.9 °F (36.6 °C)-99.5 °F (37.5 °C)]   Pulse:  []   Resp:  [18-20]   BP: ()/(39-67)   SpO2:  [89 %-99 %]     Level of Consciousness (AVPU): alert    Recent Labs     06/13/23  0412 06/14/23  0354 06/15/23  0354   WBC 19.85* 21.37* 11.89   HGB 11.9* 9.9* 8.9*   HCT 37.5 31.5* 29.6*    238 224       Recent Labs     06/13/23  0412 06/14/23  0354 06/14/23 2238 06/15/23  0354    139 137 136   K 3.6 4.3 3.9 3.7   CL 98 102 99 100   CO2 24 22* 23 22*   CREATININE 1.2 1.2 2.0* 2.5*   * 170* 247* 287*   PHOS 4.0 3.9  --  2.7   MG 2.3 2.2 2.3 2.3        Recent Labs     06/14/23  0943   PH 7.389   PCO2 44.0   PO2 55   HCO3 26.6   POCSATURATED 87*   BE 2        OXYGEN:             MEWS score: 2    bedside RN, Merline  contacted. Concern for intermittent hypotension and lethargy. Instructed to call [phone] for further concerns or assistance.    Juan Mcfarlane, " RN

## 2023-06-15 NOTE — PROVATION PATIENT INSTRUCTIONS
Discharge Summary/Instructions after an Endoscopic Procedure  Patient Name: Mariann Huff  Patient MRN: [id number]  Patient YOB: 1960  Thursday, Cherie 15, 2023  Lisa Kim MD  Dear patient,  As a result of recent federal legislation (The Federal Cures Act), you may   receive lab or pathology results from your procedure in your [facility]   account before your physician is able to contact you. Your physician or   their representative will relay the results to you with their   recommendations at their soonest availability.  Thank you,  RESTRICTIONS:  During your procedure today, you received medications for sedation.  These   medications may affect your judgment, balance and coordination.  Therefore,   for 24 hours, you have the following restrictions:   - DO NOT drive a car, operate machinery, make legal/financial decisions,   sign important papers or drink alcohol.    ACTIVITY:  Today: no heavy lifting, straining or running due to procedural   sedation/anesthesia.  The following day: return to full activity including work.  DIET:  Eat and drink normally unless instructed otherwise.     TREATMENT FOR COMMON SIDE EFFECTS:  - Mild abdominal pain, nausea, belching, bloating or excessive gas:  rest,   eat lightly and use a heating pad.  - Sore Throat: treat with throat lozenges and/or gargle with warm salt   water.  - Because air was used during the procedure, expelling large amounts of air   from your rectum or belching is normal.  - If a bowel prep was taken, you may not have a bowel movement for 1-3 days.    This is normal.  SYMPTOMS TO WATCH FOR AND REPORT TO YOUR PHYSICIAN:  1. Abdominal pain or bloating, other than gas cramps.  2. Chest pain.  3. Back pain.  4. Signs of infection such as: chills or fever occurring within 24 hours   after the procedure.  5. Rectal bleeding, which would show as bright red, maroon, or black stools.   (A tablespoon of blood from the rectum is not serious, especially if    hemorrhoids are present.)  6. Vomiting.  7. Weakness or dizziness.  GO DIRECTLY TO THE NEAREST EMERGENCY ROOM IF YOU HAVE ANY OF THE FOLLOWING:      Difficulty breathing              Chills and/or fever over 101 F   Persistent vomiting and/or vomiting blood   Severe abdominal pain   Severe chest pain   Black, tarry stools   Bleeding- more than one tablespoon   Any other symptom or condition that you feel may need urgent attention  Your doctor recommends these additional instructions:  If any biopsies were taken, your doctors clinic will contact you in 1 to 2   weeks with any results.  - [facility].   - Resume previous diet.   - Continue IV antibiotics as per primary service for probable cholangitis.  - Watch for pancreatitis, bleeding, perforation, and cholangitis.   - Resume Eliquis (apixaban) and Pletal (cilostazol) at prior doses in 3-5   days.   - Refer to surgery for cholecystectomy given recurrent acute biliary   pancreatitis now with acute pancreatitis and resultant intrapancreatic   biliary stricture causing jaundice/cholangitis.  - Repeat ERCP in 6-8 weeks to remove stent, preferably after cholecystectomy   has been completed.   - Return to referring physician.  For questions, problems or results please call your physician - Lisa Kim MD at Work:  [phone].  [facility], EMERGENCY ROOM PHONE NUMBER: [phone]  IF A COMPLICATION OR EMERGENCY SITUATION ARISES AND YOU ARE UNABLE TO REACH   YOUR PHYSICIAN - GO DIRECTLY TO THE EMERGENCY ROOM.  Lisa Kim MD  6/15/2023 5:02:00 PM  This report has been verified and signed electronically.  Dear patient,  As a result of recent federal legislation (The Federal Cures Act), you may   receive lab or pathology results from your procedure in your [facility]   account before your physician is able to contact you. Your physician or   their representative will relay the results to you with their    recommendations at their soonest availability.  Thank you,  PROVATION

## 2023-06-15 NOTE — CARE UPDATE
RAPID RESPONSE NURSE PROACTIVE ROUNDING NOTE       Time of Visit: 0850    Admit Date: 2023  LOS: 6  Code Status: Full Code   Date of Visit: 06/15/2023  : 1960  Age: 63 y.o.  Sex: female  Race: Black or   Bed: 8097/8097 A:   MRN: [id number]  Was the patient discharged from an ICU this admission? No   Was the patient discharged from a PACU within last 24 hours? No   Did the patient receive conscious sedation/general anesthesia in last 24 hours? No  Was the patient in the ED within the past 24 hours? No  Was the patient on NIPPV within the past 24 hours? No   Attending Physician: Zoë Treadwell MD  Primary Service: [facility] MED X   Time spent at the bedside: 15 -30 min    SITUATION    Notified by bedside RN via phone call.  Reason for alert: hypotension, lethargy  Called to evaluate the patient for Circulatory    BACKGROUND     Why is the patient in the hospital?: Acute encephalopathy    Patient has a past medical history of Anticoagulant long-term use, Asthma, Back pain, Bradycardia, unspecified, CAD (coronary artery disease), Carotid artery stenosis, Chronic combined systolic and diastolic CHF (congestive heart failure), Diabetes mellitus type 2 in obese, HTN (hypertension), benign, Hyperlipidemia, Keloid cicatrix, NSTEMI (non-ST elevated myocardial infarction), Nuclear sclerosis - Right Eye, Primary localized osteoarthrosis, lower leg, Senile nuclear sclerosis, Sleep apnea, and 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    Last Vitals:  Temp: 97.9 °F (36.6 °C) (06/15 0745)  Pulse: 96 (06/15 0945)  Resp: 18 (06/15 0945)  BP: 81/46 (06/15 0945)  SpO2: 98 % (06/15 0945)    24 Hours Vitals Range:  Temp:  [97.9 °F (36.6 °C)-99.5 °F (37.5 °C)]   Pulse:  []   Resp:  [10-27]   BP: ()/(39-67)   SpO2:  [89 %-100 %]     Labs:  Recent Labs     23  0412 23  0282  06/15/23  0354   WBC 19.85* 21.37* 11.89   HGB 11.9* 9.9* 8.9*   HCT 37.5 31.5* 29.6*    238 224       Recent Labs     06/13/23  0412 06/14/23  0354 06/14/23  2238 06/15/23  0354    139 137 136   K 3.6 4.3 3.9 3.7   CL 98 102 99 100   CO2 24 22* 23 22*   CREATININE 1.2 1.2 2.0* 2.5*   * 170* 247* 287*   PHOS 4.0 3.9  --  2.7   MG 2.3 2.2 2.3 2.3        Recent Labs     06/14/23  0943   PH 7.389   PCO2 44.0   PO2 55   HCO3 26.6   POCSATURATED 87*   BE 2        ASSESSMENT    Physical Exam  Vitals and nursing note reviewed. Exam conducted with a chaperone present.   Eyes:      General: Scleral icterus present.      Pupils: Pupils are equal, round, and reactive to light.   Cardiovascular:      Rate and Rhythm: Normal rate and regular rhythm.   Pulmonary:      Comments: Snoring respirations when asleep  Abdominal:      Tenderness: There is abdominal tenderness.   Skin:     General: Skin is warm and dry.      Capillary Refill: Capillary refill takes less than 2 seconds.   Neurological:      Mental Status: She is lethargic.      GCS: GCS eye subscore is 2. GCS verbal subscore is 4. GCS motor subscore is 6.     Upon assessment, patient required sternal rub to arouse initially.  Once awake, patient oriented x 3.  Hypotension noted when sleeping, but blood pressure improved to -120s.  POCT glucose 294.  Spouse at bedside states patient has had two bowel movements in the past 24 hours.  Patient received 1.5L LR bolus overnight. UO 400cc.      INTERVENTIONS    The patient was seen for Medical problem. Staff concerns included hypotension. The following interventions were performed: continuous pulse ox monitoring continued, continuous cardiac monitoring continued, and frequent assessment of BP.    RECOMMENDATIONS    - Continue close monitoring of VS, respiratory status, and neuro status.  Notify primary team with any acute changes.    - Strict aspiration precautions and strict bedrest.    - Low threshold  for ICU consult should patient remain hypotensive or become more lethargic.    PROVIDER ESCALATION    Yes/No  No    Orders received and case discussed with NA.    Disposition: Remain in room 8097.    FOLLOW-UP    Bedside RNMerline  updated on plan of care. Instructed to call the Rapid Response Nurse, Hilaria Curran, RN at [phone] for additional questions or concerns.

## 2023-06-15 NOTE — PLAN OF CARE
Problem: Adult Inpatient Plan of Care  Goal: Plan of Care Review  Outcome: Ongoing, Progressing  Goal: Patient-Specific Goal (Individualized)  Outcome: Ongoing, Progressing  Goal: Absence of Hospital-Acquired Illness or Injury  Outcome: Ongoing, Progressing  Goal: Optimal Comfort and Wellbeing  Outcome: Ongoing, Progressing  Goal: Readiness for Transition of Care  Outcome: Ongoing, Progressing     Problem: Diabetic Ketoacidosis  Goal: Fluid and Electrolyte Balance with Absence of Ketosis  Outcome: Ongoing, Progressing     Problem: Diabetes Comorbidity  Goal: Blood Glucose Level Within Targeted Range  Outcome: Ongoing, Progressing     Problem: Fluid and Electrolyte Imbalance (Acute Kidney Injury/Impairment)  Goal: Fluid and Electrolyte Balance  Outcome: Ongoing, Progressing     Problem: Renal Function Impairment (Acute Kidney Injury/Impairment)  Goal: Effective Renal Function  Outcome: Ongoing, Progressing     Problem: Adjustment to Illness (Sepsis/Septic Shock)  Goal: Optimal Coping  Outcome: Ongoing, Progressing     Problem: Adult Inpatient Plan of Care  Goal: Plan of Care Review  Outcome: Ongoing, Progressing  Goal: Patient-Specific Goal (Individualized)  Outcome: Ongoing, Progressing  Goal: Absence of Hospital-Acquired Illness or Injury  Outcome: Ongoing, Progressing  Goal: Optimal Comfort and Wellbeing  Outcome: Ongoing, Progressing  Goal: Readiness for Transition of Care  Outcome: Ongoing, Progressing     Problem: Diabetic Ketoacidosis  Goal: Fluid and Electrolyte Balance with Absence of Ketosis  Outcome: Ongoing, Progressing     Problem: Diabetes Comorbidity  Goal: Blood Glucose Level Within Targeted Range  Outcome: Ongoing, Progressing     Problem: Fluid and Electrolyte Imbalance (Acute Kidney Injury/Impairment)  Goal: Fluid and Electrolyte Balance  Outcome: Ongoing, Progressing     Problem: Renal Function Impairment (Acute Kidney Injury/Impairment)  Goal: Effective Renal Function  Outcome: Ongoing,  Progressing     Problem: Adjustment to Illness (Sepsis/Septic Shock)  Goal: Optimal Coping  Outcome: Ongoing, Progressing  Pt was received in bed sleeping hard to  awake with sternal rub. Non labored respirations, per day shift nurse patient has been sleeping since 1730 pm blood pressure check 78/45. MD made are new orders , orders noted and carried out, blood pressure rechecked after implementing orders, 82/43, 87/50, 81/42. Rapid response team notified and blood pressure check again 87/51. Bolus IV continue to infuse as ordered. Blood pressure rechecked after completion of bolus 117/57. Pt awake at this time but confused and mumbles when asked if she knows where she is. Pt is able to state her name and year. Will keep monitoring.

## 2023-06-15 NOTE — CONSULTS
[facility]  Nephrology  Consult Note    Patient Name: Mariann Huff  MRN: [id number]  Admission Date: 6/9/2023  Hospital Length of Stay: 6 days  Attending Provider: Zoë Treadwell MD   Primary Care Physician: Jasbir Haney MD  Principal Problem:Acute encephalopathy    Inpatient consult to Nephrology  Consult performed by: Sarah Block DO  Consult ordered by: Zoë Treadwell MD      Subjective:     HPI: Mariann Huff is a 63 y.o. F with CAD s/p PCI, HTN, HLD, DM, MURTAZA, PAD, AF, DVT, and recent pancreatitis who presents with N/V. N/V progressively worsening with PO intolerance following recent discharge for pancreatitis approximately 2 weeks prior to presentation. Denied abdominal pain. Admitted with pancreatitis and HHS. Received IVF and insulin gtt transitioned to subq. Four days ago developed elevated LFTs. MRCP unrevealing. Associated leukocytosis. Initiated on Zosyn. AES planning EUS /ERCP today out of concern for recurrent pancreatitis of unknown etiology. Yesterday was difficult to arouse which resolved spontaneously. Began having intermittent hypotension late yesterday which continued to today as low at 70/40s. Received 1 L NS over five hours overnight. CCM consulted today, noted swings in BP from low with decreased mentation and improving with waking up. AST /ALT downtrending. Bili up slightly to 6.5. Cr uptrending from 1.3 on arrival > 1.2, and yesterday up to 2.0, then 2.5 today from her baseline on ~ 1.0-1.3. Midodrine 10 mg TID was initiated today and Zosyn continued. MRI with contrast on 6/11/23. No other contrasted studies or nephrotoxins noted. Nephrology consulted for PAPA.      Past Medical History:   Diagnosis Date    Anticoagulant long-term use     Asthma     Back pain     Bradycardia, unspecified 5/8/2019    The etiology of the bradycardic episode is unclear.  The have appear to be respiratory in origin (at least the 1st episode).  We will continue to monitor carefully.  We are  awaiting evaluation by Cardiology.      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, with long-term current use of insulin 2020       Past Surgical History:   Procedure Laterality Date    ANTEGRADE NEPHROSTOGRAPHY Left 2019    Procedure: Nephrostogram - antegrade;  Surgeon: Robin Boyd MD;  Location: [facility] OR [address] [place] [address];  Service: Urology;  Laterality: Left;    BRONCHOSCOPY N/A 2019    Procedure: BRONCHOSCOPY;  Surgeon: Sean Ruano MD;  Location: [address];  Service: General;  Laterality: N/A;    CARDIAC CATHETERIZATION      CATARACT EXTRACTION      cataract extraction left eye      cataracts      CAUDAL EPIDURAL STEROID INJECTION N/A 2019    Procedure: Injection-steroid-epidural-caudal;  Surgeon: Dave Bentley Jr., MD;  Location: [facility];  Service: Pain Management;  Laterality: N/A;     SECTION, LOW TRANSVERSE      COLONOSCOPY N/A 2019    Procedure: COLONOSCOPY;  Surgeon: Al Alaniz MD;  Location: [facility] ([address] [place] [address]);  Service: Endoscopy;  Laterality: N/A;    CORONARY ANGIOPLASTY      CYSTOGRAM N/A 2019    Procedure: CYSTOGRAM INTRAOP;  Surgeon: Robin Boyd MD;  Location: [address];  Service: Urology;  Laterality: N/A;  1 HOUR    CYSTOSCOPY W/ RETROGRADES Left 2019    Procedure: CYSTOSCOPY, WITH RETROGRADE PYELOGRAM;  Surgeon: Robin Boyd MD;  Location: [address];   Service: Urology;  Laterality: Left;  fluro    ESOPHAGOGASTRODUODENOSCOPY W/ PEG  5/2/2019    Procedure: EGD, WITH PEG TUBE INSERTION;  Surgeon: Sean Ruano MD;  Location: [facility] OR 2ND FLR;  Service: General;;    EXCISION TURBINATE, SUBMUCOUS      FLEXIBLE SIGMOIDOSCOPY N/A 5/13/2019    Procedure: SIGMOIDOSCOPY, FLEXIBLE;  Surgeon: ALBERTO Amin MD;  Location: [facility] ENDO (2ND FLR);  Service: Endoscopy;  Laterality: N/A;    FLEXIBLE SIGMOIDOSCOPY N/A 5/21/2019    Procedure: SIGMOIDOSCOPY, FLEXIBLE;  Surgeon: ALBERTO Amin MD;  Location: [facility] ENDO (2ND FLR);  Service: Endoscopy;  Laterality: N/A;    FUSION OF LUMBAR SPINE BY ANTERIOR APPROACH Left 4/12/2019    Procedure: FUSION, SPINE, LUMBAR, ANTERIOR APPROACH Left L5-S1 Anterior to Psoa Interbody Fusion, L5-S1 Posterior Instrumentation;  Surgeon: Mk George MD;  Location: [facility] OR 2ND FLR;  Service: Neurosurgery;  Laterality: Left;  Porcedure:  Left L5-S1 Anterior to Psoa Interbody Fusion, L5-S1 Posterior Instrumentation  Surgery Time: 4 Hrs  LOS: 2-3  Anesthesia: General   Blood: Type & Screen  R    HAND SURGERY Left     HAND SURGERY Right     torn ligament repair/ Dr. Yeboah    HYSTERECTOMY      INJECTION OF STEROID Right 12/10/2020    Procedure: INJECTION, STEROID Right SI Joint Block and Steroid Injection;  Surgeon: Mk George MD;  Location: [facility];  Service: Neurosurgery;  Laterality: Right;  Procedure: Right SI Joint Block and Steroid Injection   SUrgery Time: 30 Min  LOS: 0  Anesthesia: MAC  Radiology: C-arm  Bed: [place] 4 Poster  Position: Prone    INJECTION OF STEROID Right 9/28/2021    Procedure: INJECTION, STEROID Right SI joint block & steroid injection;  Surgeon: Mk George MD;  Location: [facility] OR;  Service: Neurosurgery;  Laterality: Right;  Procedure: Right SI joint block & steroid injection  Surgery Time: 30m  Anesthesia: Local MAC  Radiology: C-arm  Bed: Regular  Position: Prone    left foot surgery      left wrist  surgery      LYSIS OF ADHESIONS N/A 2/19/2020    Procedure: LYSIS, ADHESIONS;  Surgeon: Robin Boyd MD;  [facility];  Service: Urology;  Laterality: N/A;    NASAL SEPTUM SURGERY  5/7/15    PERCUTANEOUS NEPHROSTOMY Left 4/21/2019    Procedure: Creation, Nephrostomy, Percutaneous;  Surgeon: Karina Surgeon;  Location: [facility];  Service: Anesthesiology;  Laterality: Left;    REPAIR OF URETER  4/12/2019    Procedure: REPAIR, URETER;  Surgeon: Mk George MD;  [facility];  Service: Neurosurgery;;    REPLACEMENT OF NEPHROSTOMY TUBE N/A 7/18/2019    Procedure: REPLACEMENT, NEPHROSTOMY TUBE;  Surgeon: [facility] Diagnostic Provider;  [address];  Service: Anesthesiology;  Laterality: N/A;  188    REPLACEMENT OF NEPHROSTOMY TUBE N/A 7/24/2019    Procedure: REPLACEMENT, NEPHROSTOMY TUBE;  Surgeon: [facility] Diagnostic Provider;  [facility];  Service: Anesthesiology;  Laterality: N/A;  188    REPLACEMENT OF NEPHROSTOMY TUBE N/A 10/7/2019    Procedure: REPLACEMENT, NEPHROSTOMY TUBE;  Surgeon: [facility] Diagnostic Provider;  [address];  Service: Anesthesiology;  Laterality: N/A;  189    REPLACEMENT OF NEPHROSTOMY TUBE N/A 11/25/2019    Procedure: REPLACEMENT, NEPHROSTOMY TUBE;  Surgeon: [facility] Diagnostic Provider;  [facility];  Service: Anesthesiology;  Laterality: N/A;  Room 188/Bessy    REPLACEMENT OF NEPHROSTOMY TUBE Right 2/19/2020    Procedure: REPLACEMENT, NEPHROSTOMY TUBE removal removal;  Surgeon: Robin Boyd MD;  Location: [address];  Service: Urology;  Laterality: Right;    rt elbow surgery      S/P LAD COATED STENT  05/14/2010    6 total     S/P OM1 STENT  08/2003    SINUS SURGERY      F.E.S.S.    TRACHEOSTOMY N/A 5/2/2019    Procedure: CREATION, TRACHEOSTOMY;  Surgeon: Sean Ruano MD;  Location: [address];  Service: General;  Laterality: N/A;    TUBAL LIGATION      URETERAL REIMPLANTION Left 2/19/2020    Procedure:  REIMPLANTATION, URETER WITH PSOAS HITCH;  Surgeon: Robin Boyd MD;  Location: [facility] OR [address] [place] [address];  Service: Urology;  Laterality: Left;       Review of patient's allergies indicates:   Allergen Reactions    Milk containing products (dairy)      Causes GI distress     Current Facility-Administered Medications   Medication Frequency    acetaminophen tablet 650 mg Q4H PRN    albuterol inhaler 2 puff Q6H PRN    dextrose 10% bolus 125 mL 125 mL PRN    dextrose 10% bolus 250 mL 250 mL PRN    dextrose 40 % gel 15,000 mg PRN    dextrose 40 % gel 30,000 mg PRN    famotidine tablet 20 mg Daily    glucagon (human recombinant) injection 1 mg PRN    insulin aspart U-100 pen 0-5 Units PRN    insulin detemir U-100 (Levemir) pen 20 Units QHS    melatonin tablet 6 mg Nightly PRN    midodrine tablet 10 mg TID WM    ondansetron injection 4 mg Q6H PRN    piperacillin-tazobactam (ZOSYN) 4.5 g in dextrose 5 % in water (D5W) 5 % 100 mL IVPB (MB+) Q12H    prochlorperazine injection Soln 5 mg Q6H PRN    sodium chloride 0.9% flush 10 mL PRN    vancomycin - pharmacy to dose pharmacy to manage frequency     Facility-Administered Medications Ordered in Other Encounters   Medication Frequency    0.9%  NaCl infusion Continuous    LIDOcaine (cardiac) injection PRN    PHENYLephrine injection PRN    propofol (DIPRIVAN) 10 mg/mL infusion PRN    propofol (DIPRIVAN) 10 mg/mL infusion Continuous PRN    sodium chloride 0.9% infusion Continuous PRN     Family History       Problem Relation (Age of Onset)    Diabetes Mother, Father, Sister, Brother, Sister    Heart attack Father    Heart disease Mother    Leukemia Father    No Known Problems Sister, Brother, Brother, Maternal Grandmother, Maternal Grandfather, Paternal Grandmother, Paternal Grandfather, Son, Son, Maternal Aunt, Maternal Uncle, Paternal Aunt, Paternal Uncle          Tobacco Use    Smoking status: Never    Smokeless tobacco: Never   Substance and Sexual Activity    Alcohol use: No      Alcohol/week: 0.0 standard drinks    Drug use: No    Sexual activity: Yes     Partners: Male     Birth control/protection: Post-menopausal     Comment:      Review of Systems   Constitutional:  Positive for activity change. Negative for chills and fever.   HENT:  Negative for sore throat.    Respiratory:  Negative for cough and shortness of breath.    Cardiovascular:  Negative for chest pain and leg swelling.   Gastrointestinal:  Positive for nausea and vomiting. Negative for abdominal pain, constipation and diarrhea.   Genitourinary:  Negative for dysuria.   Musculoskeletal:  Negative for myalgias.   Skin:  Negative for rash.   Neurological:  Negative for dizziness and headaches.   Psychiatric/Behavioral:  Positive for confusion.    Objective:     Vital Signs (Most Recent):  Temp: 98.6 °F (37 °C) (06/15/23 1232)  Pulse: 107 (06/15/23 1232)  Resp: 16 (06/15/23 1232)  BP: (!) 129/58 (06/15/23 1232)  SpO2: 97 % (3L NC) (06/15/23 1232) Vital Signs (24h Range):  Temp:  [97.9 °F (36.6 °C)-99.2 °F (37.3 °C)] 98.6 °F (37 °C)  Pulse:  [] 107  Resp:  [10-27] 16  SpO2:  [89 %-100 %] 97 %  BP: ()/(39-67) 129/58     Weight: 62.8 kg (138 lb 7.2 oz) (06/09/23 2311)  Body mass index is 23.76 kg/m².  Body surface area is 1.68 meters squared.    I/O last 3 completed shifts:  In: 50 [P.O.:50]  Out: 400 [Urine:400]     Physical Exam  Constitutional:       Appearance: Normal appearance.   HENT:      Head: Normocephalic and atraumatic.      Mouth/Throat:      Mouth: Mucous membranes are moist.      Pharynx: Oropharynx is clear.   Eyes:      Extraocular Movements: Extraocular movements intact.      Pupils: Pupils are equal, round, and reactive to light.   Cardiovascular:      Rate and Rhythm: Normal rate and regular rhythm.      Pulses: Normal pulses.      Heart sounds: Normal heart sounds.   Pulmonary:      Effort: Pulmonary effort is normal. No respiratory distress.      Breath sounds: Normal breath sounds.    Abdominal:      General: Abdomen is flat. Bowel sounds are normal.      Palpations: Abdomen is soft.   Musculoskeletal:         General: No swelling.      Cervical back: Neck supple.   Skin:     General: Skin is warm and dry.      Capillary Refill: Capillary refill takes less than 2 seconds.   Neurological:      General: No focal deficit present.      Mental Status: She is alert and oriented to person, place, and time. Mental status is at baseline.   Psychiatric:         Mood and Affect: Mood normal.         Behavior: Behavior normal.         Thought Content: Thought content normal.         Judgment: Judgment normal.        Significant Labs:  All labs within the past 24 hours have been reviewed.    Significant Imaging:  Reviewed    Assessment/Plan:     Renal/  PAPA (acute kidney injury)  -- PAPA likely ischemic ATN in s/o hemodynamic instability 2/2 sepsis with suspected intra-abdominal source  -- Cr up to 2.0 yesterday, which is when hypotension began, then 2.5 today from her baseline on ~ 1.0-1.3.   -- She is s/o 1 L of NS  -- Midodrine 10 mg TID was initiated today  -- Zosyn continued and undergoing EUS /ERCP today.   -- MRI with contrast on 6/11/23. No other contrasted studies or nephrotoxins noted.   -- 6/13 UA with trace protein, 4+ glu, 2+ ketones, trace blood, 1+ bili, few bacteria  -- Requiring 3 L O2    Recommendations:  -- F/u CXR  -- F/u RFP in AM  -- F/u repeat UA, urine Na, urine urea, urine Cr  -- Maintain hemodynamic stability  -- Possible source control per AES pending findings of study today  -- If not improving tomorrow, consider RP US  -- Avoid nephrotoxins  -- Renally dose medications        Thank you for your consult. I will follow-up with patient. Please contact us if you have any additional questions.    Sarah Block, DO  Nephrology  [facility]    ATTENDING PHYSICIAN ATTESTATION  I have personally verified the history and examined the patient. I thoroughly reviewed the  demographic, clinical, laboratorial and imaging information available in medical records. I agree with the assessment and recommendations provided by the subspecialty resident who was under my supervision.

## 2023-06-15 NOTE — HPI
Mariann Huff is a 63 y.o. F with CAD s/p PCI, HTN, HLD, DM, MURTAZA, PAD, AF, DVT, and recent pancreatitis who presents with N/V. N/V progressively worsening with PO intolerance following recent discharge for pancreatitis approximately 2 weeks prior to presentation. Denied abdominal pain. Admitted with pancreatitis and HHS. Received IVF and insulin gtt transitioned to subq. Four days ago developed elevated LFTs. MRCP unrevealing. Associated leukocytosis. Initiated on Zosyn. AES planning EUS /ERCP today out of concern for recurrent pancreatitis of unknown etiology. Yesterday was difficult to arouse which resolved spontaneously. Began having intermittent hypotension late yesterday which continued to today as low at 70/40s. Received 1 L NS over five hours overnight. CCM consulted today, noted swings in BP from low with decreased mentation and improving with waking up. AST /ALT downtrending. Bili up slightly to 6.5. Cr uptrending from 1.3 on arrival > 1.2, and yesterday up to 2.0, then 2.5 today from her baseline on ~ 1.0-1.3. Midodrine 10 mg TID was initiated today and Zosyn continued. MRI with contrast on 6/11/23. No other contrasted studies or nephrotoxins noted. Nephrology consulted for PAPA.

## 2023-06-15 NOTE — ANESTHESIA RELEASE NOTE
"Anesthesia Release from PACU Note    Patient: Mariann Huff    Procedure(s) Performed: Procedure(s) (LRB):  ULTRASOUND, UPPER GI TRACT, ENDOSCOPIC (N/A)  ERCP (ENDOSCOPIC RETROGRADE CHOLANGIOPANCREATOGRAPHY) (N/A)    Anesthesia type: general    Post pain: Adequate analgesia    Post assessment: no apparent anesthetic complications, tolerated procedure well and no evidence of recall    Last Vitals:   Visit Vitals  BP (!) 103/50 (BP Location: Right arm, Patient Position: Lying)   Pulse 94   Temp 36.7 °C (98.1 °F) (Temporal)   Resp 16   Ht 5' 4" (1.626 m)   Wt 62.8 kg (138 lb 7.2 oz)   LMP  (LMP Unknown)   SpO2 99%   Breastfeeding No   BMI 23.76 kg/m²       Post vital signs: stable    Level of consciousness: awake and alert     Nausea/Vomiting: no nausea/no vomiting    Complications: none    Airway Patency: patent    Respiratory: unassisted    Cardiovascular: stable and blood pressure at baseline    Hydration: euvolemic  "

## 2023-06-16 PROBLEM — E87.5 HYPERKALEMIA: Status: ACTIVE | Noted: 2023-06-16

## 2023-06-16 PROBLEM — N17.0 ATN (ACUTE TUBULAR NECROSIS): Status: ACTIVE | Noted: 2023-06-16

## 2023-06-16 LAB
ALBUMIN SERPL BCP-MCNC: 1.5 G/DL (ref 3.5–5.2)
ALP SERPL-CCNC: 1056 U/L (ref 55–135)
ALT SERPL W/O P-5'-P-CCNC: 160 U/L (ref 10–44)
ANION GAP SERPL CALC-SCNC: 11 MMOL/L (ref 8–16)
ANISOCYTOSIS BLD QL SMEAR: SLIGHT
AST SERPL-CCNC: 224 U/L (ref 10–40)
BASOPHILS # BLD AUTO: 0.02 K/UL (ref 0–0.2)
BASOPHILS NFR BLD: 0.2 % (ref 0–1.9)
BILIRUB SERPL-MCNC: 7.8 MG/DL (ref 0.1–1)
BUN SERPL-MCNC: 28 MG/DL (ref 8–23)
CALCIUM SERPL-MCNC: 8.6 MG/DL (ref 8.7–10.5)
CHLORIDE SERPL-SCNC: 108 MMOL/L (ref 95–110)
CO2 SERPL-SCNC: 23 MMOL/L (ref 23–29)
CREAT SERPL-MCNC: 2.7 MG/DL (ref 0.5–1.4)
CREAT UR-MCNC: 88 MG/DL (ref 15–325)
DIFFERENTIAL METHOD: ABNORMAL
EOSINOPHIL # BLD AUTO: 0 K/UL (ref 0–0.5)
EOSINOPHIL NFR BLD: 0.4 % (ref 0–8)
EOSINOPHIL URNS QL WRIGHT STN: NORMAL
ERYTHROCYTE [DISTWIDTH] IN BLOOD BY AUTOMATED COUNT: 13.9 % (ref 11.5–14.5)
EST. GFR  (NO RACE VARIABLE): 19.3 ML/MIN/1.73 M^2
GLUCOSE SERPL-MCNC: 144 MG/DL (ref 70–110)
HCT VFR BLD AUTO: 27.3 % (ref 37–48.5)
HGB BLD-MCNC: 8.3 G/DL (ref 12–16)
HYPOCHROMIA BLD QL SMEAR: ABNORMAL
IMM GRANULOCYTES # BLD AUTO: 0.09 K/UL (ref 0–0.04)
IMM GRANULOCYTES NFR BLD AUTO: 1.1 % (ref 0–0.5)
LYMPHOCYTES # BLD AUTO: 0.7 K/UL (ref 1–4.8)
LYMPHOCYTES NFR BLD: 7.8 % (ref 18–48)
MAGNESIUM SERPL-MCNC: 2.3 MG/DL (ref 1.6–2.6)
MCH RBC QN AUTO: 26.3 PG (ref 27–31)
MCHC RBC AUTO-ENTMCNC: 30.4 G/DL (ref 32–36)
MCV RBC AUTO: 86 FL (ref 82–98)
MONOCYTES # BLD AUTO: 0.5 K/UL (ref 0.3–1)
MONOCYTES NFR BLD: 6.4 % (ref 4–15)
NEUTROPHILS # BLD AUTO: 7.1 K/UL (ref 1.8–7.7)
NEUTROPHILS NFR BLD: 84.1 % (ref 38–73)
NRBC BLD-RTO: 0 /100 WBC
OVALOCYTES BLD QL SMEAR: ABNORMAL
PHOSPHATE SERPL-MCNC: 2.3 MG/DL (ref 2.7–4.5)
PLATELET # BLD AUTO: 247 K/UL (ref 150–450)
PLATELET BLD QL SMEAR: ABNORMAL
PMV BLD AUTO: 12.1 FL (ref 9.2–12.9)
POCT GLUCOSE: 129 MG/DL (ref 70–110)
POCT GLUCOSE: 150 MG/DL (ref 70–110)
POCT GLUCOSE: 163 MG/DL (ref 70–110)
POCT GLUCOSE: 170 MG/DL (ref 70–110)
POIKILOCYTOSIS BLD QL SMEAR: SLIGHT
POLYCHROMASIA BLD QL SMEAR: ABNORMAL
POTASSIUM SERPL-SCNC: 3.3 MMOL/L (ref 3.5–5.1)
PROT SERPL-MCNC: 5.5 G/DL (ref 6–8.4)
RBC # BLD AUTO: 3.16 M/UL (ref 4–5.4)
SODIUM SERPL-SCNC: 142 MMOL/L (ref 136–145)
SODIUM UR-SCNC: 25 MMOL/L (ref 20–250)
SPHEROCYTES BLD QL SMEAR: ABNORMAL
UUN UR-MCNC: 196 MG/DL (ref 140–1050)
VANCOMYCIN SERPL-MCNC: 13.5 UG/ML
WBC # BLD AUTO: 8.44 K/UL (ref 3.9–12.7)

## 2023-06-16 PROCEDURE — 36415 COLL VENOUS BLD VENIPUNCTURE: CPT | Performed by: HOSPITALIST

## 2023-06-16 PROCEDURE — 87205 SMEAR GRAM STAIN: CPT | Performed by: HOSPITALIST

## 2023-06-16 PROCEDURE — 99233 SBSQ HOSP IP/OBS HIGH 50: CPT | Mod: ,,, | Performed by: HOSPITALIST

## 2023-06-16 PROCEDURE — 80202 ASSAY OF VANCOMYCIN: CPT | Performed by: HOSPITALIST

## 2023-06-16 PROCEDURE — 25000003 PHARM REV CODE 250: Performed by: HOSPITALIST

## 2023-06-16 PROCEDURE — 25000003 PHARM REV CODE 250: Performed by: STUDENT IN AN ORGANIZED HEALTH CARE EDUCATION/TRAINING PROGRAM

## 2023-06-16 PROCEDURE — 82570 ASSAY OF URINE CREATININE: CPT | Performed by: HOSPITALIST

## 2023-06-16 PROCEDURE — 99223 PR INITIAL HOSPITAL CARE,LEVL III: ICD-10-PCS | Mod: ,,, | Performed by: STUDENT IN AN ORGANIZED HEALTH CARE EDUCATION/TRAINING PROGRAM

## 2023-06-16 PROCEDURE — 63600175 PHARM REV CODE 636 W HCPCS: Performed by: FAMILY MEDICINE

## 2023-06-16 PROCEDURE — 99223 1ST HOSP IP/OBS HIGH 75: CPT | Mod: ,,, | Performed by: STUDENT IN AN ORGANIZED HEALTH CARE EDUCATION/TRAINING PROGRAM

## 2023-06-16 PROCEDURE — 80053 COMPREHEN METABOLIC PANEL: CPT | Performed by: STUDENT IN AN ORGANIZED HEALTH CARE EDUCATION/TRAINING PROGRAM

## 2023-06-16 PROCEDURE — 99232 SBSQ HOSP IP/OBS MODERATE 35: CPT | Mod: ,,, | Performed by: INTERNAL MEDICINE

## 2023-06-16 PROCEDURE — 27000221 HC OXYGEN, UP TO 24 HOURS

## 2023-06-16 PROCEDURE — 94761 N-INVAS EAR/PLS OXIMETRY MLT: CPT

## 2023-06-16 PROCEDURE — 99232 PR SUBSEQUENT HOSPITAL CARE,LEVL II: ICD-10-PCS | Mod: ,,, | Performed by: INTERNAL MEDICINE

## 2023-06-16 PROCEDURE — 99233 PR SUBSEQUENT HOSPITAL CARE,LEVL III: ICD-10-PCS | Mod: ,,, | Performed by: NURSE PRACTITIONER

## 2023-06-16 PROCEDURE — 84100 ASSAY OF PHOSPHORUS: CPT | Performed by: FAMILY MEDICINE

## 2023-06-16 PROCEDURE — 85025 COMPLETE CBC W/AUTO DIFF WBC: CPT | Performed by: FAMILY MEDICINE

## 2023-06-16 PROCEDURE — 99233 SBSQ HOSP IP/OBS HIGH 50: CPT | Mod: ,,, | Performed by: NURSE PRACTITIONER

## 2023-06-16 PROCEDURE — 99233 PR SUBSEQUENT HOSPITAL CARE,LEVL III: ICD-10-PCS | Mod: ,,, | Performed by: HOSPITALIST

## 2023-06-16 PROCEDURE — 63600175 PHARM REV CODE 636 W HCPCS: Performed by: HOSPITALIST

## 2023-06-16 PROCEDURE — 84540 ASSAY OF URINE/UREA-N: CPT | Performed by: HOSPITALIST

## 2023-06-16 PROCEDURE — 84300 ASSAY OF URINE SODIUM: CPT | Performed by: HOSPITALIST

## 2023-06-16 PROCEDURE — 20600001 HC STEP DOWN PRIVATE ROOM

## 2023-06-16 PROCEDURE — 83735 ASSAY OF MAGNESIUM: CPT | Performed by: FAMILY MEDICINE

## 2023-06-16 RX ORDER — INSULIN ASPART 100 [IU]/ML
6 INJECTION, SOLUTION INTRAVENOUS; SUBCUTANEOUS
Status: DISCONTINUED | OUTPATIENT
Start: 2023-06-16 | End: 2023-06-17

## 2023-06-16 RX ADMIN — ONDANSETRON 4 MG: 2 INJECTION INTRAMUSCULAR; INTRAVENOUS at 10:06

## 2023-06-16 RX ADMIN — MIDODRINE HYDROCHLORIDE 10 MG: 5 TABLET ORAL at 12:06

## 2023-06-16 RX ADMIN — PIPERACILLIN AND TAZOBACTAM 4.5 G: 4; .5 INJECTION, POWDER, LYOPHILIZED, FOR SOLUTION INTRAVENOUS; PARENTERAL at 05:06

## 2023-06-16 RX ADMIN — VANCOMYCIN HYDROCHLORIDE 500 MG: 500 INJECTION, POWDER, LYOPHILIZED, FOR SOLUTION INTRAVENOUS at 11:06

## 2023-06-16 RX ADMIN — ONDANSETRON 4 MG: 2 INJECTION INTRAMUSCULAR; INTRAVENOUS at 07:06

## 2023-06-16 RX ADMIN — MIDODRINE HYDROCHLORIDE 10 MG: 5 TABLET ORAL at 08:06

## 2023-06-16 RX ADMIN — FAMOTIDINE 20 MG: 20 TABLET, FILM COATED ORAL at 08:06

## 2023-06-16 NOTE — ASSESSMENT & PLAN NOTE
Creatinine 1.7 on presentation ; baseline 1.0  - IVFs stopped, recurrent PAPA on 6/12 with FeNA indication pre-renal disease. 1L LR bolus ordered over 3 hrs  - worsening renal function in setting of hypotension on 6/15

## 2023-06-16 NOTE — PLAN OF CARE
Problem: Adult Inpatient Plan of Care  Goal: Plan of Care Review  Outcome: Ongoing, Progressing  Goal: Patient-Specific Goal (Individualized)  Outcome: Ongoing, Progressing  Goal: Absence of Hospital-Acquired Illness or Injury  Outcome: Ongoing, Progressing  Goal: Optimal Comfort and Wellbeing  Outcome: Ongoing, Progressing  Goal: Readiness for Transition of Care  Outcome: Ongoing, Progressing     Problem: Diabetic Ketoacidosis  Goal: Fluid and Electrolyte Balance with Absence of Ketosis  Outcome: Ongoing, Progressing     Problem: Diabetes Comorbidity  Goal: Blood Glucose Level Within Targeted Range  Outcome: Ongoing, Progressing     Problem: Fluid and Electrolyte Imbalance (Acute Kidney Injury/Impairment)  Goal: Fluid and Electrolyte Balance  Outcome: Ongoing, Progressing     Problem: Oral Intake Inadequate (Acute Kidney Injury/Impairment)  Goal: Optimal Nutrition Intake  Outcome: Ongoing, Progressing     Problem: Renal Function Impairment (Acute Kidney Injury/Impairment)  Goal: Effective Renal Function  Outcome: Ongoing, Progressing     Problem: Adjustment to Illness (Sepsis/Septic Shock)  Goal: Optimal Coping  Outcome: Ongoing, Progressing     Problem: Bleeding (Sepsis/Septic Shock)  Goal: Absence of Bleeding  Outcome: Ongoing, Progressing     Problem: Glycemic Control Impaired (Sepsis/Septic Shock)  Goal: Blood Glucose Level Within Desired Range  Outcome: Ongoing, Progressing     Problem: Infection Progression (Sepsis/Septic Shock)  Goal: Absence of Infection Signs and Symptoms  Outcome: Ongoing, Progressing     Problem: Nutrition Impaired (Sepsis/Septic Shock)  Goal: Optimal Nutrition Intake  Outcome: Ongoing, Progressing     Problem: Skin Injury Risk Increased  Goal: Skin Health and Integrity  Outcome: Ongoing, Progressing     Problem: Fall Injury Risk  Goal: Absence of Fall and Fall-Related Injury  Outcome: Ongoing, Progressing  Pt. Laying in bed resting quietly hob elevated resp even and unlabored no  distress noted at this time safety precautions maintained.

## 2023-06-16 NOTE — PROGRESS NOTES
Pharmacokinetic Assessment Follow Up: IV Vancomycin    Vancomycin serum concentration assessment(s):    Vanc random=13.5 mcg/mL, s/p load of 1.25g    Vancomycin Regimen Plan:    PAPA worsening, will continue pulse dosing with vanc 500mg x1 this AM.  Vanc random in the AM    Drug levels (last 3 results):  Recent Labs   Lab Result Units 06/16/23 0347   Vancomycin, Random ug/mL 13.5       Pharmacy will continue to follow and monitor vancomycin.    Thank you for the consult,   Geetha Garcia, PharmD, [facility]  [id number]     Patient brief summary:  Mariann Huff is a 63 y.o. female initiated on antimicrobial therapy with IV Vancomycin for treatment of intra-abdominal infection    The patient's current regimen is pulse dosing    Actual Body Weight:   62kg    Renal Function:   Estimated Creatinine Clearance: 18.7 mL/min (A) (based on SCr of 2.7 mg/dL (H)).,       CBC (last 72 hours):  Recent Labs   Lab Result Units 06/14/23  0354 06/15/23  0354 06/16/23  0347   WBC K/uL 21.37* 11.89 8.44   Hemoglobin g/dL 9.9* 8.9* 8.3*   Hematocrit % 31.5* 29.6* 27.3*   Platelets K/uL 238 224 247   Gran % % 86.2* 87.0* 84.1*   Lymph % % 3.1* 5.9* 7.8*   Mono % % 8.1 5.6 6.4   Eosinophil % % 0.0 0.1 0.4   Basophil % % 0.1 0.2 0.2   Differential Method  Automated Automated Automated       Metabolic Panel (last 72 hours):  Recent Labs   Lab Result Units 06/13/23  0924 06/14/23 0354 06/14/23  2238 06/15/23  0354 06/16/23  0347   Sodium mmol/L  --  139 137 136 142   Potassium mmol/L  --  4.3 3.9 3.7 3.3*   Chloride mmol/L  --  102 99 100 108   CO2 mmol/L  --  22* 23 22* 23   Glucose mg/dL  --  170* 247* 287* 144*   Glucose, UA  4+*  --   --   --   --    BUN mg/dL  --  19 28* 29* 28*   Creatinine mg/dL  --  1.2 2.0* 2.5* 2.7*   Albumin g/dL  --  1.9*  --  1.7* 1.5*   Total Bilirubin mg/dL  --  5.9*  --  6.5* 7.8*   Alkaline Phosphatase U/L  --  1,017*  --  1,014* 1,056*   AST U/L  --  436*  --  304* 224*   ALT U/L  --  286*  --  221* 160*    Magnesium mg/dL  --  2.2 2.3 2.3 2.3   Phosphorus mg/dL  --  3.9  --  2.7 2.3*       Vancomycin Administrations:  vancomycin given in the last 96 hours                     vancomycin 1,250 mg in dextrose 5 % (D5W) 250 mL IVPB (Vial-Mate) (mg) 1,250 mg New Bag 06/15/23 0155                    Microbiologic Results:  Microbiology Results (last 7 days)       Procedure Component Value Units Date/Time    Blood culture [[id number]] Collected: 06/13/23 0814    Order Status: Completed Specimen: Blood Updated: 06/15/23 1012     Blood Culture, Routine No Growth to date      No Growth to date      No Growth to date    Narrative:      Lft hand    Blood culture [[id number]] Collected: 06/13/23 0815    Order Status: Completed Specimen: Blood Updated: 06/15/23 1012     Blood Culture, Routine No Growth to date      No Growth to date      No Growth to date    Narrative:      Lft forearm    Blood culture [[id number]] Collected: 06/13/23 0815    Order Status: Sent Specimen: Blood Updated: 06/13/23 0816    Blood culture [[id number]] Collected: 06/13/23 0816    Order Status: Sent Specimen: Blood Updated: 06/13/23 0816

## 2023-06-16 NOTE — ASSESSMENT & PLAN NOTE
-- PAPA likely ischemic ATN in s/o hemodynamic instability 2/2 sepsis with suspected intra-abdominal source  -- MRI with contrast on 6/11/23. No other contrasted studies or nephrotoxins noted.   -- 6/13 UA with trace protein, 4+ glu, 2+ ketones, trace blood, 1+ bili, few bacteria    Urine microscopy preformed in nephro lab on 6/16/23 with many granular casts, consistent with ATN     Recommendations:  -- Will obtain RP US and bladder scan to evaluate for obstruction, but suspect ATN as primary reason for decreased UOP   -- Consider correa catheter if volume >300ml on bladder scan   -- Monitor for acute indications to dialyze   -- Continue midodrine   -- Strict I/O's   -- Maintain hemodynamic stability  -- Avoid nephrotoxins  -- Renally dose medications

## 2023-06-16 NOTE — SUBJECTIVE & OBJECTIVE
Interval History:   6/16: PAPA today, ERCP yesterday with biliary sludging - stent placement and sphincterectoy.     Review of Systems   Constitutional:  Negative for chills and fever.   HENT:  Negative for congestion and sore throat.    Eyes:  Negative for photophobia and visual disturbance.   Respiratory:  Negative for cough and shortness of breath.    Cardiovascular:  Negative for chest pain and palpitations.   Gastrointestinal:  Negative for abdominal pain, constipation, diarrhea, nausea and vomiting.   Endocrine: Negative for cold intolerance and heat intolerance.   Genitourinary:  Negative for dysuria and hematuria.   Musculoskeletal:  Negative for arthralgias and myalgias.   Skin:  Negative for rash.   Allergic/Immunologic: Negative for environmental allergies and food allergies.   Neurological:  Negative for dizziness, seizures, syncope and headaches.   Hematological:  Negative for adenopathy. Does not bruise/bleed easily.   Psychiatric/Behavioral:  Negative for hallucinations and suicidal ideas.    Objective:     Vital Signs (Most Recent):  Temp: 97.8 °F (36.6 °C) (06/16/23 1127)  Pulse: 89 (06/16/23 1127)  Resp: 19 (06/16/23 1127)  BP: (!) 120/57 (06/16/23 1127)  SpO2: 96 % (06/16/23 1238) Vital Signs (24h Range):  Temp:  [97.8 °F (36.6 °C)-98.7 °F (37.1 °C)] 97.8 °F (36.6 °C)  Pulse:  [] 89  Resp:  [14-22] 19  SpO2:  [94 %-100 %] 96 %  BP: ()/(49-81) 120/57     Weight: 62.8 kg (138 lb 7.2 oz)  Body mass index is 23.76 kg/m².    Intake/Output Summary (Last 24 hours) at 6/16/2023 1350  Last data filed at 6/16/2023 1237  Gross per 24 hour   Intake 1669.83 ml   Output --   Net 1669.83 ml         Physical Exam  Constitutional:       Appearance: She is well-developed.   HENT:      Head: Normocephalic and atraumatic.   Eyes:      Conjunctiva/sclera: Conjunctivae normal.      Pupils: Pupils are equal, round, and reactive to light.   Neck:      Thyroid: No thyromegaly.      Vascular: No JVD.    Cardiovascular:      Rate and Rhythm: Normal rate and regular rhythm.      Heart sounds: Normal heart sounds. No murmur heard.    No friction rub. No gallop.   Pulmonary:      Effort: Pulmonary effort is normal.      Breath sounds: Normal breath sounds. No wheezing or rales.   Abdominal:      General: Bowel sounds are normal. There is no distension.      Palpations: Abdomen is soft.      Tenderness: There is no abdominal tenderness. There is no guarding or rebound.   Musculoskeletal:         General: No tenderness. Normal range of motion.      Cervical back: Neck supple.   Skin:     General: Skin is warm and dry.   Neurological:      Mental Status: She is oriented to person, place, and time. She is lethargic and confused.      Cranial Nerves: No cranial nerve deficit.      Comments: + asterixis   Psychiatric:         Behavior: Behavior normal.           Significant Labs: All pertinent labs within the past 24 hours have been reviewed.    Significant Imaging: I have reviewed all pertinent imaging results/findings within the past 24 hours.

## 2023-06-16 NOTE — SUBJECTIVE & OBJECTIVE
Interval History: Cr up to 2.7 today, from 2.5 yesterday. Minimal UOP observed in suction canister from [place]. Electrolytes stable.     Objective:     Vital Signs (Most Recent):  Temp: 97.8 °F (36.6 °C) (06/16/23 1127)  Pulse: 89 (06/16/23 1127)  Resp: 19 (06/16/23 1127)  BP: (!) 120/57 (06/16/23 1127)  SpO2: 96 % (06/16/23 1238) Vital Signs (24h Range):  Temp:  [97.8 °F (36.6 °C)-98.7 °F (37.1 °C)] 97.8 °F (36.6 °C)  Pulse:  [] 89  Resp:  [14-22] 19  SpO2:  [94 %-100 %] 96 %  BP: ()/(49-81) 120/57     Weight: 62.8 kg (138 lb 7.2 oz) (06/09/23 2311)  Body mass index is 23.76 kg/m².  Body surface area is 1.68 meters squared.    I/O last 3 completed shifts:  In: 4066.5 [I.V.:1016.7; IV Piggyback:3049.8]  Out: 400 [Urine:400]     Physical Exam  Constitutional:       Appearance: Normal appearance.   HENT:      Head: Normocephalic and atraumatic.      Mouth/Throat:      Mouth: Mucous membranes are moist.      Pharynx: Oropharynx is clear.   Eyes:      Extraocular Movements: Extraocular movements intact.      Pupils: Pupils are equal, round, and reactive to light.   Cardiovascular:      Rate and Rhythm: Normal rate and regular rhythm.      Pulses: Normal pulses.      Heart sounds: Normal heart sounds.   Pulmonary:      Effort: Pulmonary effort is normal. No respiratory distress.      Breath sounds: Normal breath sounds.   Abdominal:      General: Abdomen is flat. Bowel sounds are normal.      Palpations: Abdomen is soft.   Musculoskeletal:         General: No swelling.      Cervical back: Neck supple.   Skin:     General: Skin is warm and dry.      Capillary Refill: Capillary refill takes less than 2 seconds.   Neurological:      General: No focal deficit present.      Mental Status: She is alert and oriented to person, place, and time. Mental status is at baseline.   Psychiatric:         Mood and Affect: Mood normal.         Behavior: Behavior normal.         Thought Content: Thought content normal.          Judgment: Judgment normal.        Significant Labs:  All labs within the past 24 hours have been reviewed.    Significant Imaging:  Reviewed

## 2023-06-16 NOTE — PLAN OF CARE
Problem: Adult Inpatient Plan of Care  Goal: Plan of Care Review  Outcome: Ongoing, Progressing  Goal: Patient-Specific Goal (Individualized)  Outcome: Ongoing, Progressing  Goal: Absence of Hospital-Acquired Illness or Injury  Outcome: Ongoing, Progressing  Goal: Optimal Comfort and Wellbeing  Outcome: Ongoing, Progressing  Goal: Readiness for Transition of Care  Outcome: Ongoing, Progressing     Problem: Diabetic Ketoacidosis  Goal: Fluid and Electrolyte Balance with Absence of Ketosis  Outcome: Ongoing, Progressing     Problem: Diabetes Comorbidity  Goal: Blood Glucose Level Within Targeted Range  Outcome: Ongoing, Progressing     Problem: Fluid and Electrolyte Imbalance (Acute Kidney Injury/Impairment)  Goal: Fluid and Electrolyte Balance  Outcome: Ongoing, Progressing     Problem: Oral Intake Inadequate (Acute Kidney Injury/Impairment)  Goal: Optimal Nutrition Intake  Outcome: Ongoing, Progressing     Problem: Renal Function Impairment (Acute Kidney Injury/Impairment)  Goal: Effective Renal Function  Outcome: Ongoing, Progressing     Problem: Adjustment to Illness (Sepsis/Septic Shock)  Goal: Optimal Coping  Outcome: Ongoing, Progressing     Problem: Bleeding (Sepsis/Septic Shock)  Goal: Absence of Bleeding  Outcome: Ongoing, Progressing   Pt in no distress at this time, even and non labored breathing . Denies any pain at this time non labored breathing plan of care reviewed

## 2023-06-16 NOTE — PROGRESS NOTES
"[facility] - Telemetry Stepdown  Endocrinology  Progress Note    Admit Date: 2023     63 year old  female with type 2 diabetes mellitus, CKD, CAD, hypertension, hyperlipidemia, MURTAZA (not on CPAP), PAD, Afib and hx of DVT on Eliquis and recent admission for pancreatitis who presents with HHS and developing possible biliary obstruction.     - Patient recently admitted  to  for acute pancreatitis attributed to Trulicity, which was discontinued upon d/c. Per pt's PCP this was 2nd episode of pancreatitis while on trulicity -- prior episode when increasing dose of Trulicity.   Pt states upon d/c  she was feeling slight improvement but still having intermittent n/v and not tolerating normal diet. N/V began to get worse in last few days which prompted admission. No abdominal pain on presentation for current admission, unlike last admit when she had severe abd pain for pancreatitis.    In the ED patient afebrile and hemodynamically stable saturating well on room air. Lipase >2000. BG >700 and serum osmo 326 after 1L IVFs. B-hydroxybutyrate 1.3. pH 7.4 and bicarb 34. AG 12. Patient started on aggressive IVFs and insulin gtt for HHS.     - Endocrinology consulted for initial HHS and glucose management      Regarding Type 2 Diabetes Mellitus:    - Initially diagnosed with Type 2 diabetes mellitus: pt not sure - states "long time ago"; at least as far back as  per review of elevated a1c in epic  - Home diabetic medications include: Farxiga 10mg qd, Glipizide ER 10mg daily, Levemir 20u qhs (but never started). She has been on MDI previously, most recently she was on Toujeo and Humalog discontinued 2021 but she states she was on insulin up until about 10mo ago.  - Family History: Not asked  - Weight based dosin kg x 0.4 (CKD) = 25 TDD x 0.5 = 12 basal / 12 prandial  - 1700/TDD = 68 (estimated insulin sensitivity factor)  - 450/TDD = 18 (estimated starting carb ratio for prandial " "dosing)    Lab Results   Component Value Date    HGBA1C 8.1 (H) 2023    HGBA1C 8.2 (H) 2023    HGBA1C 8.8 (H) 2023    CPEPTIDE 1.36 2017     Lab Results   Component Value Date    EGFRNORACEVR 19.3 (A) 2023    EGFRNORACEVR 21.2 (A) 06/15/2023    EGFRNORACEVR 27.7 (A) 2023       Interval HPI:   Overnight events:  No acute events reported overnight  Eatin%  Nausea: No  Hypoglycemia and intervention: No  Fever: No  TPN and/or TF: No  If yes, type of TF/TPN and rate: NA    /63 (BP Location: Right arm, Patient Position: Lying)   Pulse 87   Temp 97.8 °F (36.6 °C)   Resp 16   Ht 5' 4" (1.626 m)   Wt 62.8 kg (138 lb 7.2 oz)   LMP  (LMP Unknown)   SpO2 99%   Breastfeeding No   BMI 23.76 kg/m²     Labs Reviewed and Include    Recent Labs   Lab 23  0347   *   CALCIUM 8.6*   ALBUMIN 1.5*   PROT 5.5*      K 3.3*   CO2 23      BUN 28*   CREATININE 2.7*   ALKPHOS 1,056*   *   *   BILITOT 7.8*     Lab Results   Component Value Date    WBC 8.44 2023    HGB 8.3 (L) 2023    HCT 27.3 (L) 2023    MCV 86 2023     2023     No results for input(s): TSH, FREET4 in the last 168 hours.  Lab Results   Component Value Date    HGBA1C 8.1 (H) 2023       Nutritional status:   Body mass index is 23.76 kg/m².  Lab Results   Component Value Date    ALBUMIN 1.5 (L) 2023    ALBUMIN 1.7 (L) 06/15/2023    ALBUMIN 1.9 (L) 2023     No results found for: PREALBUMIN    Estimated Creatinine Clearance: 18.7 mL/min (A) (based on SCr of 2.7 mg/dL (H)).    Accu-Checks  Recent Labs     23  1623 23  2033 23  0731 23  1144 23  1558 23  1952 06/15/23  0806 06/15/23  1104 06/15/23  1708 06/15/23  1956   POCTGLUCOSE 162* 139* 181* 199* 226* 242* 294* 216* 188* 161*       Current Medications and/or Treatments Impacting Glycemic Control  Immunotherapy:    Immunosuppressants       None "          Steroids:   Hormones (From admission, onward)      Start     Stop Route Frequency Ordered    06/09/23 2028  melatonin tablet 6 mg         -- Oral Nightly PRN 06/09/23 1931          Pressors:    Autonomic Drugs (From admission, onward)      Start     Stop Route Frequency Ordered    06/15/23 1200  midodrine tablet 10 mg         -- Oral 3 times daily with meals 06/15/23 0940          Hyperglycemia/Diabetes Medications:   Antihyperglycemics (From admission, onward)      Start     Stop Route Frequency Ordered    06/16/23 0815  insulin aspart U-100 pen 6 Units         -- SubQ 3 times daily with meals 06/16/23 0808    06/12/23 2100  insulin detemir U-100 (Levemir) pen 20 Units         -- SubQ Nightly 06/12/23 0904    06/10/23 1315  insulin regular in 0.9 % NaCl 100 unit/100 mL (1 unit/mL) infusion        Question:  Enter initial dose (Units/hr):  Answer:  0.6    06/10 2200 IV Continuous 06/10/23 1211    06/10/23 1309  insulin aspart U-100 pen 0-5 Units         -- SubQ As needed (PRN) 06/10/23 1211            ASSESSMENT and PLAN    Cardiac/Vascular  Hyperlipidemia associated with type 2 diabetes mellitus  - On Lipitor 40mg and Repatha outpatient  - Management per primary    Renal/  PAPA (acute kidney injury)  - Improved/resolved. Likely 2/2 IVVD 2/2 HHS on admission  - Management per primary    Endocrine  Type 2 diabetes mellitus with hyperosmolar hyperglycemic state (HHS)  - HHS on admission. Recent pancreatitis admission, not yet back to normal po intake with worsening hepatic function. Taking Glipizide and Farxiga at home. Never picked up Levemir Rx recently prescribed by her PCP  - 24 hour glucose trend: low 200s down to mid 100s    - Continue Levemir 20 units at night  - Once appetite improves, resume aspart at lower dose of 6 units TIDAC+LDC    - If blood glucose greater than 300, please ask patient not to eat food or drink anything other than water until correctional insulin has brought it back below  250    GI  History of pancreatitis  - She has now had two episodes of pancreatitis on Trulicity, however suspicion for choledocholithiasis as cause for pancreatitis. GI evaluating for worsening hepatic function  - Hold Trulicity at discharge        Mario Alberto Carroll DO  [facility], 6th Floor  [address] [place], [place], [address]    Office: [phone]  Fax: [phone]    Disclaimer: This note has been generated using voice-recognition software. There may be typographical errors that have been missed during proof-reading.    The above history labs imaging impression and plan were discussed with attending physician who is in agreement and also took part in this patient's care.  I personally reviewed all of the patients available medications, labs, imaging, vitals, allergies, medical history.

## 2023-06-16 NOTE — PROGRESS NOTES
[facility] - Telemetry [facility] Medicine  Progress Note    Patient Name: Mariann Huff  MRN: [id number]  Patient Class: IP- Inpatient   Admission Date: 6/9/2023  Length of Stay: 7 days  Attending Physician: Zoë Treadwell MD  Primary Care Provider: Jasbir Haney MD        Subjective:     Principal Problem:Acute encephalopathy        HPI:  63F with PMH of CAD with GINGER , HTN, HLD, DM2, MURTAZA (not on CPAP), PAD, Afib and hx of DVT on Eliquis and recent admission for pancreatitis wo presents to the ED with nausea and vomiting. Patient recently admitted 05/25 to 05/26 for first episode of acute pancreatitis. Lipase >2000 at that time. CT abdomen and US without evidence of biliary obstruction or pancreatic inflammation. Lipid panel wnl. Patient pain and n/v improved with treatment for acute pancreatitis felt secondary to home med Trulicity. She was discharged with new insulin regimen which she reports she never picked up from pharmacy so has been taking farxiga only at home. States that she has had progressively worsening n/v and po intolerance. States that she is not having severe pain similar to recent pancreatitis. Denies fevers, chills, chest pain, shortness of breath.     In the ED patient afebrile and hemodynamically stable saturating well on room air. Lipase >2000. BG >700 and serum osmo 326 after 1L IVFs. B-hydroxybutyrate 1.3. pH 7.4 and bicarb 34. AG 12. Patient started on aggressive IVFs and insulin gtt for management on pancreatitis and HHS. Admitted to the care of medicine for further evaluation and management.       Overview/Hospital Course:  Patient transitioned from insulin drip to subcutaneous insulin on 06/10.  On 06/11, patient developed new hyperbilirubinemia and worsening liver enzymes and given history of pancreatitis concern for possible choledocholithiasis.  MRCP ordered did not demonstrate any focal obstruction showever liver enzymes and alk phos increasing in size. In speaking with [facility],  patient to be off apixaban and pletal prior to ERCP.      Interval History:   6/15: issue with hypotension this am    Review of Systems   Constitutional:  Negative for chills and fever.   HENT:  Negative for congestion and sore throat.    Eyes:  Negative for photophobia and visual disturbance.   Respiratory:  Negative for cough and shortness of breath.    Cardiovascular:  Negative for chest pain and palpitations.   Gastrointestinal:  Negative for abdominal pain, constipation, diarrhea, nausea and vomiting.   Endocrine: Negative for cold intolerance and heat intolerance.   Genitourinary:  Negative for dysuria and hematuria.   Musculoskeletal:  Negative for arthralgias and myalgias.   Skin:  Negative for rash.   Allergic/Immunologic: Negative for environmental allergies and food allergies.   Neurological:  Negative for dizziness, seizures, syncope and headaches.   Hematological:  Negative for adenopathy. Does not bruise/bleed easily.   Psychiatric/Behavioral:  Negative for hallucinations and suicidal ideas.    Objective:     Vital Signs (Most Recent):  Temp: 97.8 °F (36.6 °C) (06/16/23 1127)  Pulse: 89 (06/16/23 1127)  Resp: 19 (06/16/23 1127)  BP: (!) 120/57 (06/16/23 1127)  SpO2: 96 % (06/16/23 1238) Vital Signs (24h Range):  Temp:  [97.8 °F (36.6 °C)-98.7 °F (37.1 °C)] 97.8 °F (36.6 °C)  Pulse:  [] 89  Resp:  [14-22] 19  SpO2:  [94 %-100 %] 96 %  BP: ()/(49-81) 120/57     Weight: 62.8 kg (138 lb 7.2 oz)  Body mass index is 23.76 kg/m².    Intake/Output Summary (Last 24 hours) at 6/16/2023 1342  Last data filed at 6/16/2023 1237  Gross per 24 hour   Intake 1669.83 ml   Output --   Net 1669.83 ml         Physical Exam  Constitutional:       Appearance: She is well-developed.   HENT:      Head: Normocephalic and atraumatic.   Eyes:      Conjunctiva/sclera: Conjunctivae normal.      Pupils: Pupils are equal, round, and reactive to light.   Neck:      Thyroid: No thyromegaly.      Vascular: No JVD.    Cardiovascular:      Rate and Rhythm: Normal rate and regular rhythm.      Heart sounds: Normal heart sounds. No murmur heard.    No friction rub. No gallop.   Pulmonary:      Effort: Pulmonary effort is normal.      Breath sounds: Normal breath sounds. No wheezing or rales.   Abdominal:      General: Bowel sounds are normal. There is no distension.      Palpations: Abdomen is soft.      Tenderness: There is no abdominal tenderness. There is no guarding or rebound.   Musculoskeletal:         General: No tenderness. Normal range of motion.      Cervical back: Neck supple.   Skin:     General: Skin is warm and dry.   Neurological:      Mental Status: She is oriented to person, place, and time. She is lethargic and confused.      Cranial Nerves: No cranial nerve deficit.      Comments: + asterixis   Psychiatric:         Behavior: Behavior normal.           Significant Labs: All pertinent labs within the past 24 hours have been reviewed.    Significant Imaging: I have reviewed all pertinent imaging results/findings within the past 24 hours.      Assessment/Plan:      * Acute encephalopathy  Perhaps related to sepsis.  Patient on 6/14 a.m., increasingly lethargic.  Per , last night she had jerking movements when attempting to go to the restroom with assistance.  On exam this morning she had asterixis and required sternal rubbing to awaken her.  - Ammonia was ordered and was normal for the past 2 days, patient has not had a bowel movement in over a week per the patient's .  We will give lactulose enema regardless of ammonia level  - VBG ordered, personal interpretation reflects no evidence of hypercapnic respiratory failure  - blood cultures were collected yesterday x2, no growth today  - escalated patient's antibiotics from ceftriaxone and metronidazole to Zosyn given her increasing white blood cell count      Hyperbilirubinemia  Given worsening liver enzymes, alk-phos, bilirubin concern for ongoing  "obstructive process.  - [facility] consulted, in speaking with them plan for EUS of pancreas on 06/15 as patient did not have her Pletal held as well.  Apixaban held in preparation      History of DVT (deep vein thrombosis)  - hold home Eliquis      Hyperosmolar hyperglycemic state (HHS)  IP HHS/DKA Pathway initiated. On presentation BG >700 and serum osmo 326 after 1L IVFs. B-hydroxybutyrate 1.3. pH 7.4 and bicarb 34. AG 12. Patient was started on insulin drip on admission but later in the day, patient transitioned to Levemir 14 units at night and aspart 3 units with meals.  Started on diabetic diet  - patient currently on Levemir 20 units nightly, 10 units aspart with meals  - further insulin titration per endocrinology  - POC AC/HS      Acute recurrent pancreatitis  Lipase >2000 on presentation, downtrending without any identifiable cause for elevation. Patient without any abdominal pain. Discontinue HCTZ as it could potentially be cause of pancreatitis as well  - US abdomen with gallbladder sludge and "prominence" of the pancreatic duct   - liver enzymes, alk-phos, bilirubin elevated on 06/11, suggestive of possible choledocholithiasis as cause of pancreatitis.  MRCP ordered  - MRCP on my personal interpretation without any focal obstruction or retained stones, however liver enzymes continue to elevate  - [facility] was consulted for findings and after discussion, recommended ERCP/EUS once off apixaban and pletal        PAD (peripheral artery disease)  - hold pletal, ASA, and statin  - started on apixaban for PAD outpatient as well, holding in setting of upcoming procedure on 6/14      Asthma  - albuterol prn      Sepsis with acute liver failure without hepatic coma or septic shock  This patient does have evidence of infective focus  My overall impression is sepsis.  Source: Abdominal and potentially bacteremia  Antibiotics given-   Antibiotics (72h ago, onward)    Start     Stop Route Frequency Ordered    06/14/23 1100  " piperacillin-tazobactam (ZOSYN) 4.5 g in dextrose 5 % in water (D5W) 5 % 100 mL IVPB (MB+)         -- IV Every 8 hours (non-standard times) 06/14/23 0959        Latest lactate reviewed-  Recent Labs   Lab 06/13/23  0813   LACTATE 1.3     Organ dysfunction indicated by Acute liver injury    Fluid challenge Not needed - patient is not hypotensive      Post- resuscitation assessment No - Post resuscitation assessment not needed       Will Not start Pressors- Levophed for MAP of 65  Source control achieved by: Zosyn and potential [facility] procedure on 6/15    PAPA (acute kidney injury)  Creatinine 1.7 on presentation ; baseline 1.0  - IVFs stopped, recurrent PAPA on 6/12 with FeNA indication pre-renal disease. 1L LR bolus ordered over 3 hrs  - renal function improved on 06/13, continuing IV fluids  - avoid nephrotoxic agents as appropriate  - continue to monitor      Hyperlipidemia associated with type 2 diabetes mellitus  Given rise in liver enzymes, discontinuing atorvastatin    Hypertension associated with diabetes  Discontinued all antihypertensive medications given worsening blood pressure and concerns for sepsis      VTE Risk Mitigation (From admission, onward)    None          Discharge Planning   REBECCA: 6/15/2023     Code Status: Full Code   Is the patient medically ready for discharge?: No    Reason for patient still in hospital (select all that apply): Patient trending condition  Discharge Plan A: Home with family   Discharge Delays: None known at this time              Zoë Treadwell MD  Department of Hospital Medicine   [facility] - Telemetry Stepdown

## 2023-06-16 NOTE — ASSESSMENT & PLAN NOTE
- HHS on admission. Recent pancreatitis admission, not yet back to normal po intake with worsening hepatic function. Taking Glipizide and Farxiga at home. Never picked up Levemir Rx recently prescribed by her PCP  - 24 hour glucose trend: low 200s down to mid 100s    - Continue Levemir 20 units at night  - Once appetite improves, resume aspart at lower dose of 6 units TIDAC+LDC    - If blood glucose greater than 300, please ask patient not to eat food or drink anything other than water until correctional insulin has brought it back below 250

## 2023-06-16 NOTE — SUBJECTIVE & OBJECTIVE
Interval History:   6/15: issue with hypotension this am    Review of Systems   Constitutional:  Negative for chills and fever.   HENT:  Negative for congestion and sore throat.    Eyes:  Negative for photophobia and visual disturbance.   Respiratory:  Negative for cough and shortness of breath.    Cardiovascular:  Negative for chest pain and palpitations.   Gastrointestinal:  Negative for abdominal pain, constipation, diarrhea, nausea and vomiting.   Endocrine: Negative for cold intolerance and heat intolerance.   Genitourinary:  Negative for dysuria and hematuria.   Musculoskeletal:  Negative for arthralgias and myalgias.   Skin:  Negative for rash.   Allergic/Immunologic: Negative for environmental allergies and food allergies.   Neurological:  Negative for dizziness, seizures, syncope and headaches.   Hematological:  Negative for adenopathy. Does not bruise/bleed easily.   Psychiatric/Behavioral:  Negative for hallucinations and suicidal ideas.    Objective:     Vital Signs (Most Recent):  Temp: 97.8 °F (36.6 °C) (06/16/23 1127)  Pulse: 89 (06/16/23 1127)  Resp: 19 (06/16/23 1127)  BP: (!) 120/57 (06/16/23 1127)  SpO2: 96 % (06/16/23 1238) Vital Signs (24h Range):  Temp:  [97.8 °F (36.6 °C)-98.7 °F (37.1 °C)] 97.8 °F (36.6 °C)  Pulse:  [] 89  Resp:  [14-22] 19  SpO2:  [94 %-100 %] 96 %  BP: ()/(49-81) 120/57     Weight: 62.8 kg (138 lb 7.2 oz)  Body mass index is 23.76 kg/m².    Intake/Output Summary (Last 24 hours) at 6/16/2023 1342  Last data filed at 6/16/2023 1237  Gross per 24 hour   Intake 1669.83 ml   Output --   Net 1669.83 ml         Physical Exam  Constitutional:       Appearance: She is well-developed.   HENT:      Head: Normocephalic and atraumatic.   Eyes:      Conjunctiva/sclera: Conjunctivae normal.      Pupils: Pupils are equal, round, and reactive to light.   Neck:      Thyroid: No thyromegaly.      Vascular: No JVD.   Cardiovascular:      Rate and Rhythm: Normal rate and regular  rhythm.      Heart sounds: Normal heart sounds. No murmur heard.    No friction rub. No gallop.   Pulmonary:      Effort: Pulmonary effort is normal.      Breath sounds: Normal breath sounds. No wheezing or rales.   Abdominal:      General: Bowel sounds are normal. There is no distension.      Palpations: Abdomen is soft.      Tenderness: There is no abdominal tenderness. There is no guarding or rebound.   Musculoskeletal:         General: No tenderness. Normal range of motion.      Cervical back: Neck supple.   Skin:     General: Skin is warm and dry.   Neurological:      Mental Status: She is oriented to person, place, and time. She is lethargic and confused.      Cranial Nerves: No cranial nerve deficit.      Comments: + asterixis   Psychiatric:         Behavior: Behavior normal.           Significant Labs: All pertinent labs within the past 24 hours have been reviewed.    Significant Imaging: I have reviewed all pertinent imaging results/findings within the past 24 hours.

## 2023-06-16 NOTE — CARE UPDATE
RAPID RESPONSE NURSE ROUND       Rounding completed with charge RNReji for labile BP reports pt stable at this time. No additional concerns verbalized at this time. Instructed to call [phone] for further concerns or assistance.

## 2023-06-16 NOTE — SUBJECTIVE & OBJECTIVE
"Interval HPI:   Overnight events:  No acute events reported overnight  Eatin%  Nausea: No  Hypoglycemia and intervention: No  Fever: No  TPN and/or TF: No  If yes, type of TF/TPN and rate: NA    /63 (BP Location: Right arm, Patient Position: Lying)   Pulse 87   Temp 97.8 °F (36.6 °C)   Resp 16   Ht 5' 4" (1.626 m)   Wt 62.8 kg (138 lb 7.2 oz)   LMP  (LMP Unknown)   SpO2 99%   Breastfeeding No   BMI 23.76 kg/m²     Labs Reviewed and Include    Recent Labs   Lab 23  0347   *   CALCIUM 8.6*   ALBUMIN 1.5*   PROT 5.5*      K 3.3*   CO2 23      BUN 28*   CREATININE 2.7*   ALKPHOS 1,056*   *   *   BILITOT 7.8*     Lab Results   Component Value Date    WBC 8.44 2023    HGB 8.3 (L) 2023    HCT 27.3 (L) 2023    MCV 86 2023     2023     No results for input(s): TSH, FREET4 in the last 168 hours.  Lab Results   Component Value Date    HGBA1C 8.1 (H) 2023       Nutritional status:   Body mass index is 23.76 kg/m².  Lab Results   Component Value Date    ALBUMIN 1.5 (L) 2023    ALBUMIN 1.7 (L) 06/15/2023    ALBUMIN 1.9 (L) 2023     No results found for: PREALBUMIN    Estimated Creatinine Clearance: 18.7 mL/min (A) (based on SCr of 2.7 mg/dL (H)).    Accu-Checks  Recent Labs     23  1623 233 23  0731 23  1144 23  1558 23  1952 06/15/23  0806 06/15/23  1104 06/15/23  1708 06/15/23  1956   POCTGLUCOSE 162* 139* 181* 199* 226* 242* 294* 216* 188* 161*       Current Medications and/or Treatments Impacting Glycemic Control  Immunotherapy:    Immunosuppressants       None          Steroids:   Hormones (From admission, onward)      Start     Stop Route Frequency Ordered    23  melatonin tablet 6 mg         -- Oral Nightly PRN 23 193          Pressors:    Autonomic Drugs (From admission, onward)      Start     Stop Route Frequency Ordered    06/15/23 1200  midodrine " tablet 10 mg         -- Oral 3 times daily with meals 06/15/23 0940          Hyperglycemia/Diabetes Medications:   Antihyperglycemics (From admission, onward)      Start     Stop Route Frequency Ordered    06/16/23 0815  insulin aspart U-100 pen 6 Units         -- SubQ 3 times daily with meals 06/16/23 0808    06/12/23 2100  insulin detemir U-100 (Levemir) pen 20 Units         -- SubQ Nightly 06/12/23 0904    06/10/23 1315  insulin regular in 0.9 % NaCl 100 unit/100 mL (1 unit/mL) infusion        Question:  Enter initial dose (Units/hr):  Answer:  0.6    06/10 2200 IV Continuous 06/10/23 1211    06/10/23 1309  insulin aspart U-100 pen 0-5 Units         -- SubQ As needed (PRN) 06/10/23 1211

## 2023-06-16 NOTE — PLAN OF CARE
Recommendations    1. Continue Diabetic Diet.   2. Continue Boost Glucose Control Vanilla with all meals   3. RD to monitor and follow-up.    Goals: Meet % EEN/EPN by follow-up date.  Nutrition Goal Status: new  Communication of RD Recs: other (comment) (new)

## 2023-06-16 NOTE — PROGRESS NOTES
[facility] - Telemetry Stepdown  Nephrology  Progress Note    Patient Name: Mariann Huff  MRN: [id number]  Admission Date: 6/9/2023  Hospital Length of Stay: 7 days  Attending Provider: Zoë Treadwell MD   Primary Care Physician: Jasbir Haney MD  Principal Problem:Acute encephalopathy      Interval History: Cr up to 2.7 today, from 2.5 yesterday. Minimal UOP observed in suction canister from purewick. Electrolytes stable.     Objective:     Vital Signs (Most Recent):  Temp: 97.8 °F (36.6 °C) (06/16/23 1127)  Pulse: 89 (06/16/23 1127)  Resp: 19 (06/16/23 1127)  BP: (!) 120/57 (06/16/23 1127)  SpO2: 96 % (06/16/23 1238) Vital Signs (24h Range):  Temp:  [97.8 °F (36.6 °C)-98.7 °F (37.1 °C)] 97.8 °F (36.6 °C)  Pulse:  [] 89  Resp:  [14-22] 19  SpO2:  [94 %-100 %] 96 %  BP: ()/(49-81) 120/57     Weight: 62.8 kg (138 lb 7.2 oz) (06/09/23 2311)  Body mass index is 23.76 kg/m².  Body surface area is 1.68 meters squared.    I/O last 3 completed shifts:  In: 4066.5 [I.V.:1016.7; IV Piggyback:3049.8]  Out: 400 [Urine:400]    Physical Exam  Constitutional:       Appearance: Normal appearance.   HENT:      Head: Normocephalic and atraumatic.      Mouth/Throat:      Mouth: Mucous membranes are moist.      Pharynx: Oropharynx is clear.   Eyes:      Extraocular Movements: Extraocular movements intact.      Pupils: Pupils are equal, round, and reactive to light.   Cardiovascular:      Rate and Rhythm: Normal rate and regular rhythm.      Pulses: Normal pulses.      Heart sounds: Normal heart sounds.   Pulmonary:      Effort: Pulmonary effort is normal. No respiratory distress.      Breath sounds: Normal breath sounds.   Abdominal:      General: Abdomen is flat. Bowel sounds are normal.      Palpations: Abdomen is soft.   Musculoskeletal:         General: No swelling.      Cervical back: Neck supple.   Skin:     General: Skin is warm and dry.      Capillary Refill: Capillary refill takes less than 2 seconds.    Neurological:      General: No focal deficit present.      Mental Status: She is alert and oriented to person, place, and time. Mental status is at baseline.   Psychiatric:         Mood and Affect: Mood normal.         Behavior: Behavior normal.         Thought Content: Thought content normal.         Judgment: Judgment normal.        Significant Labs:  All labs within the past 24 hours have been reviewed.    Significant Imaging:  Reviewed    Assessment/Plan:     Neuro  * Acute encephalopathy  -Plan per primary team     Renal/  PAPA (acute kidney injury)  -- PAPA likely ischemic ATN in s/o hemodynamic instability 2/2 sepsis with suspected intra-abdominal source  -- MRI with contrast on 6/11/23. No other contrasted studies or nephrotoxins noted.   -- 6/13 UA with trace protein, 4+ glu, 2+ ketones, trace blood, 1+ bili, few bacteria    Urine microscopy preformed in nephro lab on 6/16/23 with many granular casts, consistent with ATN     Recommendations:  -- Will obtain RP US and bladder scan to evaluate for obstruction, but suspect ATN as primary reason for decreased UOP   -- Consider correa catheter if volume >300ml on bladder scan   -- Monitor for acute indications to dialyze   -- Continue midodrine   -- Strict I/O's   -- Maintain hemodynamic stability  -- Avoid nephrotoxins  -- Renally dose medications        Thank you for your consult. I will follow-up with patient. Please contact us if you have any additional questions.    Ben Roberson, NP  Nephrology  [facility] - Telemetry Stepdown

## 2023-06-16 NOTE — PROGRESS NOTES
[facility] - Telemetry [facility] Medicine  Progress Note    Patient Name: Mariann Huff  MRN: [id number]  Patient Class: IP- Inpatient   Admission Date: 6/9/2023  Length of Stay: 7 days  Attending Physician: Zoë Treadwell MD  Primary Care Provider: Jasbir Haney MD        Subjective:     Principal Problem:<principal problem not specified>        HPI:  63F with PMH of CAD with GINGER , HTN, HLD, DM2, MURTAZA (not on CPAP), PAD, Afib and hx of DVT on Eliquis and recent admission for pancreatitis wo presents to the ED with nausea and vomiting. Patient recently admitted 05/25 to 05/26 for first episode of acute pancreatitis. Lipase >2000 at that time. CT abdomen and US without evidence of biliary obstruction or pancreatic inflammation. Lipid panel wnl. Patient pain and n/v improved with treatment for acute pancreatitis felt secondary to home med Trulicity. She was discharged with new insulin regimen which she reports she never picked up from pharmacy so has been taking farxiga only at home. States that she has had progressively worsening n/v and po intolerance. States that she is not having severe pain similar to recent pancreatitis. Denies fevers, chills, chest pain, shortness of breath.     In the ED patient afebrile and hemodynamically stable saturating well on room air. Lipase >2000. BG >700 and serum osmo 326 after 1L IVFs. B-hydroxybutyrate 1.3. pH 7.4 and bicarb 34. AG 12. Patient started on aggressive IVFs and insulin gtt for management on pancreatitis and HHS. Admitted to the care of medicine for further evaluation and management.       Overview/Hospital Course:  Patient transitioned from insulin drip to subcutaneous insulin on 06/10.  On 06/11, patient developed new hyperbilirubinemia and worsening liver enzymes and given history of pancreatitis concern for possible choledocholithiasis.  MRCP ordered did not demonstrate any focal obstruction showever liver enzymes and alk phos increasing in size. In speaking  with AES, patient to be off apixaban and pletal prior to ERCP.      Interval History:   6/16: PPAA today, ERCP yesterday with biliary sludging - stent placement and sphincterectoy.     Review of Systems   Constitutional:  Negative for chills and fever.   HENT:  Negative for congestion and sore throat.    Eyes:  Negative for photophobia and visual disturbance.   Respiratory:  Negative for cough and shortness of breath.    Cardiovascular:  Negative for chest pain and palpitations.   Gastrointestinal:  Negative for abdominal pain, constipation, diarrhea, nausea and vomiting.   Endocrine: Negative for cold intolerance and heat intolerance.   Genitourinary:  Negative for dysuria and hematuria.   Musculoskeletal:  Negative for arthralgias and myalgias.   Skin:  Negative for rash.   Allergic/Immunologic: Negative for environmental allergies and food allergies.   Neurological:  Negative for dizziness, seizures, syncope and headaches.   Hematological:  Negative for adenopathy. Does not bruise/bleed easily.   Psychiatric/Behavioral:  Negative for hallucinations and suicidal ideas.    Objective:     Vital Signs (Most Recent):  Temp: 97.8 °F (36.6 °C) (06/16/23 1127)  Pulse: 89 (06/16/23 1127)  Resp: 19 (06/16/23 1127)  BP: (!) 120/57 (06/16/23 1127)  SpO2: 96 % (06/16/23 1238) Vital Signs (24h Range):  Temp:  [97.8 °F (36.6 °C)-98.7 °F (37.1 °C)] 97.8 °F (36.6 °C)  Pulse:  [] 89  Resp:  [14-22] 19  SpO2:  [94 %-100 %] 96 %  BP: ()/(49-81) 120/57     Weight: 62.8 kg (138 lb 7.2 oz)  Body mass index is 23.76 kg/m².    Intake/Output Summary (Last 24 hours) at 6/16/2023 1350  Last data filed at 6/16/2023 1237  Gross per 24 hour   Intake 1669.83 ml   Output --   Net 1669.83 ml         Physical Exam  Constitutional:       Appearance: She is well-developed.   HENT:      Head: Normocephalic and atraumatic.   Eyes:      Conjunctiva/sclera: Conjunctivae normal.      Pupils: Pupils are equal, round, and reactive to light.    Neck:      Thyroid: No thyromegaly.      Vascular: No JVD.   Cardiovascular:      Rate and Rhythm: Normal rate and regular rhythm.      Heart sounds: Normal heart sounds. No murmur heard.    No friction rub. No gallop.   Pulmonary:      Effort: Pulmonary effort is normal.      Breath sounds: Normal breath sounds. No wheezing or rales.   Abdominal:      General: Bowel sounds are normal. There is no distension.      Palpations: Abdomen is soft.      Tenderness: There is no abdominal tenderness. There is no guarding or rebound.   Musculoskeletal:         General: No tenderness. Normal range of motion.      Cervical back: Neck supple.   Skin:     General: Skin is warm and dry.   Neurological:      Mental Status: She is oriented to person, place, and time. She is lethargic and confused.      Cranial Nerves: No cranial nerve deficit.      Comments: + asterixis   Psychiatric:         Behavior: Behavior normal.           Significant Labs: All pertinent labs within the past 24 hours have been reviewed.    Significant Imaging: I have reviewed all pertinent imaging results/findings within the past 24 hours.      Assessment/Plan:      Hyperbilirubinemia  Given worsening liver enzymes, alk-phos, bilirubin concern for ongoing obstructive process.  - [facility] consulted, in speaking with them plan for EUS of pancreas on 06/15 as patient did not have her Pletal held as well.  Apixaban held in preparation      History of DVT (deep vein thrombosis)  - hold home Eliquis      Hyperosmolar hyperglycemic state (HHS)  IP HHS/DKA Pathway initiated. On presentation BG >700 and serum osmo 326 after 1L IVFs. B-hydroxybutyrate 1.3. pH 7.4 and bicarb 34. AG 12. Patient was started on insulin drip on admission but later in the day, patient transitioned to Levemir 14 units at night and aspart 3 units with meals.  Started on diabetic diet  - patient currently on Levemir 20 units nightly, 10 units aspart with meals  - further insulin titration per  "endocrinology  - POC AC/HS      Acute recurrent pancreatitis  Lipase >2000 on presentation, downtrending without any identifiable cause for elevation. Patient without any abdominal pain. Discontinue HCTZ as it could potentially be cause of pancreatitis as well  - US abdomen with gallbladder sludge and "prominence" of the pancreatic duct   - liver enzymes, alk-phos, bilirubin elevated on 06/11, suggestive of possible choledocholithiasis as cause of pancreatitis.  MRCP ordered  - MRCP on my personal interpretation without any focal obstruction or retained stones, however liver enzymes continue to elevate  - AES was consulted for findings and after discussion, recommended ERCP/EUS once off apixaban and pletal        PAD (peripheral artery disease)  - hold pletal, ASA, and statin  - started on apixaban for PAD outpatient as well, holding in setting of upcoming procedure on 6/14      Asthma  - albuterol prn      Acute encephalopathy  Perhaps related to sepsis.  Patient on 6/14 a.m., increasingly lethargic.  Per , last night she had jerking movements when attempting to go to the restroom with assistance.  On exam this morning she had asterixis and required sternal rubbing to awaken her.  - Ammonia was ordered and was normal for the past 2 days, patient has not had a bowel movement in over a week per the patient's .  We will give lactulose enema regardless of ammonia level  - VBG ordered, personal interpretation reflects no evidence of hypercapnic respiratory failure  - blood cultures were collected yesterday x2, no growth today  - escalated patient's antibiotics from ceftriaxone and metronidazole to Zosyn given her increasing white blood cell count      Sepsis with acute liver failure without hepatic coma or septic shock  This patient does have evidence of infective focus  My overall impression is sepsis.  Source: Abdominal and potentially bacteremia  Antibiotics given-   Antibiotics (72h ago, onward)    " Start     Stop Route Frequency Ordered    06/14/23 1100  piperacillin-tazobactam (ZOSYN) 4.5 g in dextrose 5 % in water (D5W) 5 % 100 mL IVPB (MB+)         -- IV Every 8 hours (non-standard times) 06/14/23 0959        Latest lactate reviewed-  Recent Labs   Lab 06/13/23  0813   LACTATE 1.3     Organ dysfunction indicated by Acute liver injury    Fluid challenge Not needed - patient is not hypotensive      Post- resuscitation assessment No - Post resuscitation assessment not needed       Will Not start Pressors- Levophed for MAP of 65  Source control achieved by: Zosyn and potential AES procedure on 6/15    PAPA (acute kidney injury)  Creatinine 1.7 on presentation ; baseline 1.0  - IVFs stopped, recurrent PAPA on 6/12 with FeNA indication pre-renal disease. 1L LR bolus ordered over 3 hrs  - worsening renal function in setting of hypotension on 6/15      Hyperlipidemia associated with type 2 diabetes mellitus  Given rise in liver enzymes, discontinuing atorvastatin    Hypertension associated with diabetes  Discontinued all antihypertensive medications given worsening blood pressure and concerns for sepsis      VTE Risk Mitigation (From admission, onward)    None          Discharge Planning   REBECCA: 6/15/2023     Code Status: Full Code   Is the patient medically ready for discharge?: No    Reason for patient still in hospital (select all that apply): Patient trending condition  Discharge Plan A: Home with family   Discharge Delays: None known at this time              Zoë Treadwell MD  Department of Hospital Medicine   [facility] - Tele[facility] Step[facility]

## 2023-06-16 NOTE — TREATMENT PLAN
[facility] Treatment Plan    Mariann Huff is a 63 y.o. female admitted to hospital 6/9/2023 (Hospital Day: 8) due to Acute encephalopathy.     Interval History  Overnight, Midodrine initiated due to issues with hypotension. This morning at bedside patient was awake and fully oriented. She denies abdominal pain and is reporting feeling hungry. Labs notable for normal WBC, on-going hyperbilirubinemia (7.8 from 6.5), and improving transaminitis.     Objective  Temp:  [97.8 °F (36.6 °C)-98.7 °F (37.1 °C)] 97.8 °F (36.6 °C) (06/16 0800)  Pulse:  [] 87 (06/16 0800)  BP: ()/(41-81) 135/62 (06/16 0800)  Resp:  [14-27] 16 (06/16 0800)  SpO2:  [89 %-100 %] 99 % (06/16 0800)    General: Alert, Oriented x3, no distress  Abdomen: Non-distended. Normal tympany. Soft. Non-tender. No peritoneal signs.    Laboratory  Lab Results   Component Value Date    WBC 8.44 06/16/2023    HGB 8.3 (L) 06/16/2023    HCT 27.3 (L) 06/16/2023    MCV 86 06/16/2023     06/16/2023       Lab Results   Component Value Date     (H) 06/16/2023     (H) 06/16/2023    ALKPHOS 1,056 (H) 06/16/2023    BILITOT 7.8 (H) 06/16/2023     Assessment  This is a 63 year old female with a PMH significant for CAD (status post PCI in 2014), DVT (on Apixaban), MURTAZA, pancreatitis (diagnosed 05/2023; attributed to TRULICITY usage), peripheral artery disease (on PLETAL), and T2DM who was admitted to [facility] on 06/09 for management of T2DM with HHS. Hospital course associated with development of recurrent pancreatitis with development of cholangitis by 06/13, and PAPA and metabolic encephalopathy on 06/14. Work-up significant for MRI/MRCP on 06/11 showing inflammatory changes around pancreatic head (unclear if new or residual inflammation from prior episode of pancreatitis) with gallstone sludge, but without biliary dilatation or choledocholithiasis. She is status post EUS/ERCP on 06/15 showing large amount of gallbladder sludge with localized  biliary stricture in the lower third of the main duct that was suspected to be from inflammation from acute pancreatitis; status post sphincterotomy with drainage of gallbladder sludge and placement of plastic stent into the common bile duct. Post-procedure, encephalopathy resolved, however PAPA and progressive hyperbilirubinemia on-going.     Recommendations    -Continue Zosyn and follow-up blood cultures; would favor continuing antibiotics for cholangitis until 06/19.   -Hold Apixaban and Pletal until 06/18.   -Okay to resume bland diet from AES standpoint.   -General surgery evaluation for consideration of cholecystectomy with suspicion for large amount of gallbladder sludge in bile ducts causing current episode of pancreatitis.   -CMP daily.   - Plan of care was discussed with primary team.    Thank you for involving us in the care of Mariann Huff. Please call with any additional questions, concerns or changes in the patient's clinical status.        Vijay Bonilla MD, PGY-V  Gastroenterology Fellow  [facility]

## 2023-06-16 NOTE — PROGRESS NOTES
[facility] - Telemetry Stepdown  Adult Nutrition  Progress Note    SUMMARY       Recommendations    1. Continue Diabetic Diet.   2. Continue Boost Glucose Control Vanilla with all meals   3. RD to monitor and follow-up.    Goals: Meet % EEN/EPN by follow-up date.  Nutrition Goal Status: new  Communication of RD Recs: other (comment) (new)    Assessment and Plan    Nutrition Problem  Inadequate oral intake    Related to (etiology):   Decreased ability to consume sufficient energy    Signs and Symptoms (as evidenced by):   N/V, decreased appetite/PO intake.    Interventions/Recommendations (treatment strategy):  Collaboration of nutrition care with other providers  Commercial beverages    Nutrition Diagnosis Status:   New    Reason for Assessment    Reason For Assessment: RD follow-up  Diagnosis: other (see comments) (HX of Acute recurrent pancreatitis)  Relevant Medical History: DM2, MURTAZA, HTN, HLD, PAD, A-fib, hx DVT, CAD w/ GINGER  Interdisciplinary Rounds: attended  General Information Comments: RD spoke with patient at bedside. Patient c/o N/V. Reports decreased appetite sicne admission prefers cold foods italian ice, popsicles. Notes she would like ginger ale at Lunch/Dinner and cranberry juice at breakfast. RD recommends ONS since PO intake has not been great patient aggreeable. Denies difficulty chewing/swallowing. patient appears nourished, patient is at nutritional risk if PO intake continues to decline. Wt loss noted. NFPE to be completed at a later date 10lbs (10%) x 1 month.  Nutrition Discharge Planning: Pending clinical course    Nutrition Risk Screen    Nutrition Risk Screen: no indicators present    Nutrition/Diet History    Patient Reported Diet/Restrictions/Preferences: diabetic diet  Supplemental Drinks or Food Habits: Glucerna  Food Allergies: NKFA  Factors Affecting Nutritional Intake: decreased appetite, nausea/vomiting    Anthropometrics    Temp: 97.8 °F (36.6 °C)  Height Method:  "Stated  Height: 5' 4" (162.6 cm)  Height (inches): 64 in  Weight Method: Bed Scale  Weight: 62.8 kg (138 lb 7.2 oz)  Weight (lb): 138.45 lb  Ideal Body Weight (IBW), Female: 120 lb  % Ideal Body Weight, Female (lb): 115.38 %  BMI (Calculated): 23.8  BMI Grade: 18.5-24.9 - normal       Lab/Procedures/Meds    Pertinent Labs Reviewed: reviewed  Pertinent Labs Comments: H/H: 8.3/27.3, MCHC 30.4, K 3.3, BUn 28, Creat 2.7, GFR 19.3, Glu 144, Ca 8.6, P 2.3 Alkaline Phos 1,056, ,   Pertinent Medications Reviewed: reviewed  Pertinent Medications Comments: famotidine, insulin, vancomycin      Estimated/Assessed Needs    Weight Used For Calorie Calculations: 62.6 kg (138 lb)  Energy Calorie Requirements (kcal): 6575-8318 kcal/d (25-30 kcal/kg)  Energy Need Method: Kcal/kg  Protein Requirements: 50-63 g/d (0.8-1.0 g/kg)  Weight Used For Protein Calculations: 62.6 kg (138 lb)        RDA Method (mL): 1878  CHO Requirement: 235-279 g      Nutrition Prescription Ordered    Current Diet Order: 2000 Calorie Diabetic Diet  Oral Nutrition Supplement: Boost Glucose Control    Evaluation of Received Nutrient/Fluid Intake    I/O: +1.54 L  Comments: LBM: 6/16  % Intake of Estimated Energy Needs: 75 - 100 %  % Meal Intake: 0 - 25 %    Nutrition Risk    Level of Risk/Frequency of Follow-up: moderate (1-2x/ week)     Monitor and Evaluation    Food and Nutrient Intake: food and beverage intake  Food and Nutrient Adminstration: diet order  Knowledge/Beliefs/Attitudes: food and nutrition knowledge/skill, beliefs and attitudes  Physical Activity and Function: nutrition-related ADLs and IADLs, factors affecting access to physical activity  Anthropometric Measurements: weight, weight change, body mass index  Biochemical Data, Medical Tests and Procedures: gastrointestinal profile, glucose/endocrine profile, electrolyte and renal panel, inflammatory profile, lipid profile  Nutrition-Focused Physical Findings: overall appearance, " extremities, muscles and bones, head and eyes, skin     Nutrition Follow-Up    RD Follow-up?: Yes    Jonna Harris Registration Eligible, Provisional LDN

## 2023-06-16 NOTE — CODE/ RAPID DOCUMENTATION
RAPID RESPONSE NURSE PROACTIVE ROUNDING NOTE       Time of Visit:     Admit Date: 2023  LOS: 6  Code Status: Full Code   Date of Visit: 06/15/2023  : 1960  Age: 63 y.o.  Sex: female  Race: Black or   Bed: 8097/8097 A:   MRN: [id number]  Was the patient discharged from an ICU this admission? No   Was the patient discharged from a PACU within last 24 hours? Yes   Did the patient receive conscious sedation/general anesthesia in last 24 hours? No  Was the patient in the ED within the past 24 hours? No  Was the patient on NIPPV within the past 24 hours? No   Attending Physician: Zoë Treadwell MD  Primary Service: [facility] MED X   Time spent at the bedside: 45 - 60 min    SITUATION    Notified by Key[employer] patient alert.  Reason for alert: hypoTN; called the nurse to inform her that the pt's last obtained BP was not accuaqt   Called to evaluate the patient for Circulatory; BP 60/40s (53)    BACKGROUND     Why is the patient in the hospital?: Acute encephalopathy    Patient has a past medical history of Anticoagulant long-term use, Asthma, Back pain, Bradycardia, unspecified, CAD (coronary artery disease), Carotid artery stenosis, Chronic combined systolic and diastolic CHF (congestive heart failure), Diabetes mellitus type 2 in obese, HTN (hypertension), benign, Hyperlipidemia, Keloid cicatrix, NSTEMI (non-ST elevated myocardial infarction), Nuclear sclerosis - Right Eye, Primary localized osteoarthrosis, lower leg, Senile nuclear sclerosis, Sleep apnea, and 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    Last Vitals:  Temp: 98.6 °F (37 °C) (06/15 1940)  Pulse: 104 (06/15 2059)  Resp: 19 (06/15 2059)  BP: 125/56 (06/15 2059)  SpO2: 100 % (06/15 2059)    24 Hours Vitals Range:  Temp:  [97.9 °F (36.6 °C)-99.2 °F (37.3 °C)]   Pulse:  []   Resp:  [10-27]   BP: ()/(39-81)    SpO2:  [89 %-100 %]     Labs:  Recent Labs     06/13/23  0412 06/14/23  0354 06/15/23  0354   WBC 19.85* 21.37* 11.89   HGB 11.9* 9.9* 8.9*   HCT 37.5 31.5* 29.6*    238 224       Recent Labs     06/13/23  0412 06/14/23  0354 06/14/23  2238 06/15/23  0354    139 137 136   K 3.6 4.3 3.9 3.7   CL 98 102 99 100   CO2 24 22* 23 22*   CREATININE 1.2 1.2 2.0* 2.5*   * 170* 247* 287*   PHOS 4.0 3.9  --  2.7   MG 2.3 2.2 2.3 2.3        Recent Labs     06/14/23  0943   PH 7.389   PCO2 44.0   PO2 55   HCO3 26.6   POCSATURATED 87*   BE 2        ASSESSMENT    Physical Exam  Constitutional:       Appearance: Normal appearance.   HENT:      Mouth/Throat:      Mouth: Mucous membranes are dry.      Pharynx: Oropharynx is clear.   Cardiovascular:      Rate and Rhythm: Normal rate.   Pulmonary:      Effort: Pulmonary effort is normal.   Skin:     General: Skin is warm and dry.      Capillary Refill: Capillary refill takes less than 2 seconds.   Neurological:      Mental Status: She is alert and oriented to person, place, and time.      GCS: GCS eye subscore is 4. GCS verbal subscore is 5. GCS motor subscore is 6.   Psychiatric:         Attention and Perception: Attention normal.         Mood and Affect: Mood normal.         Cognition and Memory: Cognition normal.       INTERVENTIONS    The patient was seen for hypoTN. Upon checking the patient's chart the BP was noted to be 60/40s. On arrival to her assigned room 8097, she was laying in the bed eating dinner, surrounded by family. Obtained BP with bedside equipment. New BP obtained 125/56; 500ml bolus was ordered. Pt remained AAOx3.    RECOMMENDATIONS    Continue monitoring BP frequently. Give scheduled Midodrine.     PROVIDER ESCALATION    Yes/No  No    Orders received and case discussed with NA.    Disposition: Remain in room 8097.    FOLLOW-UP    Bedside Shazia HERRON  updated on plan of care. Instructed to call the Rapid Response Nurse, Gabi Valle RN at  [phone] for additional questions or concerns.

## 2023-06-17 ENCOUNTER — TELEPHONE (OUTPATIENT)
Dept: ENDOSCOPY | Facility: HOSPITAL | Age: 63
End: 2023-06-17

## 2023-06-17 DIAGNOSIS — K83.1 BILIARY STRICTURE: Primary | ICD-10-CM

## 2023-06-17 LAB
ALBUMIN SERPL BCP-MCNC: 1.6 G/DL (ref 3.5–5.2)
ALP SERPL-CCNC: 1237 U/L (ref 55–135)
ALT SERPL W/O P-5'-P-CCNC: 146 U/L (ref 10–44)
ANION GAP SERPL CALC-SCNC: 13 MMOL/L (ref 8–16)
AST SERPL-CCNC: 195 U/L (ref 10–40)
BASOPHILS # BLD AUTO: 0.04 K/UL (ref 0–0.2)
BASOPHILS NFR BLD: 0.5 % (ref 0–1.9)
BILIRUB SERPL-MCNC: 9.2 MG/DL (ref 0.1–1)
BUN SERPL-MCNC: 34 MG/DL (ref 8–23)
CALCIUM SERPL-MCNC: 9.9 MG/DL (ref 8.7–10.5)
CHLORIDE SERPL-SCNC: 109 MMOL/L (ref 95–110)
CO2 SERPL-SCNC: 21 MMOL/L (ref 23–29)
CREAT SERPL-MCNC: 3.1 MG/DL (ref 0.5–1.4)
DIFFERENTIAL METHOD: ABNORMAL
EOSINOPHIL # BLD AUTO: 0.2 K/UL (ref 0–0.5)
EOSINOPHIL NFR BLD: 1.8 % (ref 0–8)
ERYTHROCYTE [DISTWIDTH] IN BLOOD BY AUTOMATED COUNT: 14.2 % (ref 11.5–14.5)
EST. GFR  (NO RACE VARIABLE): 16.4 ML/MIN/1.73 M^2
GLUCOSE SERPL-MCNC: 108 MG/DL (ref 70–110)
HCT VFR BLD AUTO: 29.6 % (ref 37–48.5)
HGB BLD-MCNC: 9.4 G/DL (ref 12–16)
IMM GRANULOCYTES # BLD AUTO: 0.14 K/UL (ref 0–0.04)
IMM GRANULOCYTES NFR BLD AUTO: 1.7 % (ref 0–0.5)
LYMPHOCYTES # BLD AUTO: 0.8 K/UL (ref 1–4.8)
LYMPHOCYTES NFR BLD: 9.8 % (ref 18–48)
MAGNESIUM SERPL-MCNC: 2.6 MG/DL (ref 1.6–2.6)
MCH RBC QN AUTO: 26.3 PG (ref 27–31)
MCHC RBC AUTO-ENTMCNC: 31.8 G/DL (ref 32–36)
MCV RBC AUTO: 83 FL (ref 82–98)
MONOCYTES # BLD AUTO: 0.8 K/UL (ref 0.3–1)
MONOCYTES NFR BLD: 9.1 % (ref 4–15)
NEUTROPHILS # BLD AUTO: 6.4 K/UL (ref 1.8–7.7)
NEUTROPHILS NFR BLD: 77.1 % (ref 38–73)
NRBC BLD-RTO: 1 /100 WBC
PHOSPHATE SERPL-MCNC: 2.2 MG/DL (ref 2.7–4.5)
PLATELET # BLD AUTO: 278 K/UL (ref 150–450)
PMV BLD AUTO: 11.6 FL (ref 9.2–12.9)
POCT GLUCOSE: 189 MG/DL (ref 70–110)
POCT GLUCOSE: 225 MG/DL (ref 70–110)
POCT GLUCOSE: 245 MG/DL (ref 70–110)
POCT GLUCOSE: 68 MG/DL (ref 70–110)
POCT GLUCOSE: 75 MG/DL (ref 70–110)
POTASSIUM SERPL-SCNC: 3.7 MMOL/L (ref 3.5–5.1)
PROT SERPL-MCNC: 6.4 G/DL (ref 6–8.4)
RBC # BLD AUTO: 3.58 M/UL (ref 4–5.4)
SODIUM SERPL-SCNC: 143 MMOL/L (ref 136–145)
VANCOMYCIN SERPL-MCNC: 16.8 UG/ML
WBC # BLD AUTO: 8.26 K/UL (ref 3.9–12.7)

## 2023-06-17 PROCEDURE — 20600001 HC STEP DOWN PRIVATE ROOM

## 2023-06-17 PROCEDURE — 99232 PR SUBSEQUENT HOSPITAL CARE,LEVL II: ICD-10-PCS | Mod: ,,, | Performed by: INTERNAL MEDICINE

## 2023-06-17 PROCEDURE — 63600175 PHARM REV CODE 636 W HCPCS: Performed by: FAMILY MEDICINE

## 2023-06-17 PROCEDURE — 84100 ASSAY OF PHOSPHORUS: CPT | Performed by: FAMILY MEDICINE

## 2023-06-17 PROCEDURE — 99233 PR SUBSEQUENT HOSPITAL CARE,LEVL III: ICD-10-PCS | Mod: ,,, | Performed by: HOSPITALIST

## 2023-06-17 PROCEDURE — 80053 COMPREHEN METABOLIC PANEL: CPT | Performed by: STUDENT IN AN ORGANIZED HEALTH CARE EDUCATION/TRAINING PROGRAM

## 2023-06-17 PROCEDURE — 25000003 PHARM REV CODE 250: Performed by: HOSPITALIST

## 2023-06-17 PROCEDURE — 85025 COMPLETE CBC W/AUTO DIFF WBC: CPT | Performed by: FAMILY MEDICINE

## 2023-06-17 PROCEDURE — 36415 COLL VENOUS BLD VENIPUNCTURE: CPT | Performed by: HOSPITALIST

## 2023-06-17 PROCEDURE — 25000003 PHARM REV CODE 250: Performed by: STUDENT IN AN ORGANIZED HEALTH CARE EDUCATION/TRAINING PROGRAM

## 2023-06-17 PROCEDURE — 99233 SBSQ HOSP IP/OBS HIGH 50: CPT | Mod: ,,, | Performed by: HOSPITALIST

## 2023-06-17 PROCEDURE — 83735 ASSAY OF MAGNESIUM: CPT | Performed by: FAMILY MEDICINE

## 2023-06-17 PROCEDURE — 99232 SBSQ HOSP IP/OBS MODERATE 35: CPT | Mod: ,,, | Performed by: INTERNAL MEDICINE

## 2023-06-17 PROCEDURE — 63600175 PHARM REV CODE 636 W HCPCS: Performed by: HOSPITALIST

## 2023-06-17 PROCEDURE — 80202 ASSAY OF VANCOMYCIN: CPT | Performed by: HOSPITALIST

## 2023-06-17 RX ORDER — METRONIDAZOLE 500 MG/1
500 TABLET ORAL EVERY 8 HOURS
Status: DISCONTINUED | OUTPATIENT
Start: 2023-06-17 | End: 2023-06-20

## 2023-06-17 RX ORDER — CIPROFLOXACIN 500 MG/1
500 TABLET ORAL EVERY 24 HOURS
Status: DISCONTINUED | OUTPATIENT
Start: 2023-06-17 | End: 2023-06-20

## 2023-06-17 RX ORDER — INSULIN ASPART 100 [IU]/ML
3 INJECTION, SOLUTION INTRAVENOUS; SUBCUTANEOUS
Status: DISCONTINUED | OUTPATIENT
Start: 2023-06-17 | End: 2023-06-17

## 2023-06-17 RX ORDER — LOPERAMIDE HYDROCHLORIDE 2 MG/1
4 CAPSULE ORAL ONCE
Status: COMPLETED | OUTPATIENT
Start: 2023-06-17 | End: 2023-06-17

## 2023-06-17 RX ADMIN — PIPERACILLIN AND TAZOBACTAM 4.5 G: 4; .5 INJECTION, POWDER, LYOPHILIZED, FOR SOLUTION INTRAVENOUS; PARENTERAL at 05:06

## 2023-06-17 RX ADMIN — METRONIDAZOLE 500 MG: 500 TABLET ORAL at 01:06

## 2023-06-17 RX ADMIN — METRONIDAZOLE 500 MG: 500 TABLET ORAL at 09:06

## 2023-06-17 RX ADMIN — MIDODRINE HYDROCHLORIDE 10 MG: 5 TABLET ORAL at 11:06

## 2023-06-17 RX ADMIN — ONDANSETRON 4 MG: 2 INJECTION INTRAMUSCULAR; INTRAVENOUS at 06:06

## 2023-06-17 RX ADMIN — MIDODRINE HYDROCHLORIDE 10 MG: 5 TABLET ORAL at 08:06

## 2023-06-17 RX ADMIN — LOPERAMIDE HYDROCHLORIDE 4 MG: 2 CAPSULE ORAL at 09:06

## 2023-06-17 RX ADMIN — INSULIN ASPART 1 UNITS: 100 INJECTION, SOLUTION INTRAVENOUS; SUBCUTANEOUS at 09:06

## 2023-06-17 RX ADMIN — FAMOTIDINE 20 MG: 20 TABLET, FILM COATED ORAL at 08:06

## 2023-06-17 RX ADMIN — INSULIN ASPART 2 UNITS: 100 INJECTION, SOLUTION INTRAVENOUS; SUBCUTANEOUS at 05:06

## 2023-06-17 RX ADMIN — CIPROFLOXACIN 500 MG: 500 TABLET, FILM COATED ORAL at 09:06

## 2023-06-17 NOTE — PLAN OF CARE
Problem: Adult Inpatient Plan of Care  Goal: Plan of Care Review  Outcome: Ongoing, Progressing  Goal: Patient-Specific Goal (Individualized)  Outcome: Ongoing, Progressing  Goal: Absence of Hospital-Acquired Illness or Injury  Outcome: Ongoing, Progressing  Goal: Optimal Comfort and Wellbeing  Outcome: Ongoing, Progressing  Goal: Readiness for Transition of Care  Outcome: Ongoing, Progressing     Problem: Diabetic Ketoacidosis  Goal: Fluid and Electrolyte Balance with Absence of Ketosis  Outcome: Ongoing, Progressing     Problem: Diabetes Comorbidity  Goal: Blood Glucose Level Within Targeted Range  Outcome: Ongoing, Progressing     Problem: Fluid and Electrolyte Imbalance (Acute Kidney Injury/Impairment)  Goal: Fluid and Electrolyte Balance  Outcome: Ongoing, Progressing     Problem: Oral Intake Inadequate (Acute Kidney Injury/Impairment)  Goal: Optimal Nutrition Intake  Outcome: Ongoing, Progressing     Problem: Renal Function Impairment (Acute Kidney Injury/Impairment)  Goal: Effective Renal Function  Outcome: Ongoing, Progressing     Problem: Adjustment to Illness (Sepsis/Septic Shock)  Goal: Optimal Coping  Outcome: Ongoing, Progressing     Problem: Bleeding (Sepsis/Septic Shock)  Goal: Absence of Bleeding  Outcome: Ongoing, Progressing     Problem: Glycemic Control Impaired (Sepsis/Septic Shock)  Goal: Blood Glucose Level Within Desired Range  Outcome: Ongoing, Progressing     Problem: Infection Progression (Sepsis/Septic Shock)  Goal: Absence of Infection Signs and Symptoms  Outcome: Ongoing, Progressing     Problem: Nutrition Impaired (Sepsis/Septic Shock)  Goal: Optimal Nutrition Intake  Outcome: Ongoing, Progressing     Problem: Skin Injury Risk Increased  Goal: Skin Health and Integrity  Outcome: Ongoing, Progressing     Problem: Fall Injury Risk  Goal: Absence of Fall and Fall-Related Injury  Outcome: Ongoing, Progressing   Pt. Sitting up in bed eating her supper resp even and unlabored no distress  noted at this time meal tray set up provided for pt. No complaint of pain at this time. Safety precautions maintained.

## 2023-06-17 NOTE — ASSESSMENT & PLAN NOTE
- She has now had two episodes of pancreatitis on Trulicity, however suspicion for choledocholithiasis as cause for pancreatitis  - Laparoscopic cholecystectomy tentatively planned for 06/19/2023  - Hold Trulicity at discharge

## 2023-06-17 NOTE — ASSESSMENT & PLAN NOTE
- HHS on admission. Recent pancreatitis admission, not yet back to normal po intake with worsening hepatic function. Taking Glipizide and Farxiga at home. Never picked up Levemir Rx recently prescribed by her PCP  - Noted plan for laparoscopic cholecystectomy tentatively planned for 06/19/2023  - 24 hour glucose trend: mid to low 100s, worsening renal function with decreased insulin clearance increasing risk of hypoglycemia    - Decrease Levemir from 20 down to 16 units at night starting this evening to prevent hypoglycemia, will likely decrease further tomorrow evening in anticipation of laparoscopic cholecystectomy 06/19/2023  - Once appetite improves, resume aspart at lower dose of 3 units TIDAC+LDC    - If blood glucose greater than 300, please ask patient not to eat food or drink anything other than water until correctional insulin has brought it back below 250

## 2023-06-17 NOTE — PLAN OF CARE
Problem: Adult Inpatient Plan of Care  Goal: Plan of Care Review  Outcome: Ongoing, Progressing  Goal: Patient-Specific Goal (Individualized)  Outcome: Ongoing, Progressing  Goal: Absence of Hospital-Acquired Illness or Injury  Outcome: Ongoing, Progressing  Goal: Optimal Comfort and Wellbeing  Outcome: Ongoing, Progressing  Goal: Readiness for Transition of Care  Outcome: Ongoing, Progressing     Problem: Diabetic Ketoacidosis  Goal: Fluid and Electrolyte Balance with Absence of Ketosis  Outcome: Ongoing, Progressing     Problem: Diabetes Comorbidity  Goal: Blood Glucose Level Within Targeted Range  Outcome: Ongoing, Progressing     Problem: Fluid and Electrolyte Imbalance (Acute Kidney Injury/Impairment)  Goal: Fluid and Electrolyte Balance  Outcome: Ongoing, Progressing     Problem: Oral Intake Inadequate (Acute Kidney Injury/Impairment)  Goal: Optimal Nutrition Intake  Outcome: Ongoing, Progressing     Problem: Renal Function Impairment (Acute Kidney Injury/Impairment)  Goal: Effective Renal Function  Outcome: Ongoing, Progressing     Problem: Adjustment to Illness (Sepsis/Septic Shock)  Goal: Optimal Coping  Outcome: Ongoing, Progressing     Problem: Glycemic Control Impaired (Sepsis/Septic Shock)  Goal: Blood Glucose Level Within Desired Range  Outcome: Ongoing, Progressing     Problem: Infection Progression (Sepsis/Septic Shock)  Goal: Absence of Infection Signs and Symptoms  Outcome: Ongoing, Progressing     Problem: Nutrition Impaired (Sepsis/Septic Shock)  Goal: Optimal Nutrition Intake  Outcome: Ongoing, Progressing     Problem: Skin Injury Risk Increased  Goal: Skin Health and Integrity  Outcome: Ongoing, Progressing     Problem: Fall Injury Risk  Goal: Absence of Fall and Fall-Related Injury  Outcome: Ongoing, Progressing  PT alert and oriented x 4. Able to voice all wants and needs. All needs met. MRI of Brain without contrast was completed this shift.   She has remained free of falls and injuries.  Safety eduction and plan of care reviewed with PT and she voiced understanding. Bed is low and locked with call light with in easy reach.  Bed alarm set.

## 2023-06-17 NOTE — SUBJECTIVE & OBJECTIVE
Interval History:   6/17: PAPA worsening - thought ATN     Review of Systems   Constitutional:  Negative for chills and fever.   HENT:  Negative for congestion and sore throat.    Eyes:  Negative for photophobia and visual disturbance.   Respiratory:  Negative for cough and shortness of breath.    Cardiovascular:  Negative for chest pain and palpitations.   Gastrointestinal:  Negative for abdominal pain, constipation, diarrhea, nausea and vomiting.   Endocrine: Negative for cold intolerance and heat intolerance.   Genitourinary:  Negative for dysuria and hematuria.   Musculoskeletal:  Negative for arthralgias and myalgias.   Skin:  Negative for rash.   Allergic/Immunologic: Negative for environmental allergies and food allergies.   Neurological:  Negative for dizziness, seizures, syncope and headaches.   Hematological:  Negative for adenopathy. Does not bruise/bleed easily.   Psychiatric/Behavioral:  Negative for hallucinations and suicidal ideas.    Objective:     Vital Signs (Most Recent):  Temp: 98.1 °F (36.7 °C) (06/17/23 1141)  Pulse: 75 (06/17/23 1141)  Resp: 18 (06/17/23 1141)  BP: (!) 116/59 (06/17/23 1141)  SpO2: 96 % (06/17/23 1400) Vital Signs (24h Range):  Temp:  [97.6 °F (36.4 °C)-98.7 °F (37.1 °C)] 98.1 °F (36.7 °C)  Pulse:  [72-87] 75  Resp:  [18] 18  SpO2:  [92 %-96 %] 96 %  BP: ()/(50-72) 116/59     Weight: 62.8 kg (138 lb 7.2 oz)  Body mass index is 23.76 kg/m².    Intake/Output Summary (Last 24 hours) at 6/17/2023 1416  Last data filed at 6/17/2023 1149  Gross per 24 hour   Intake 720 ml   Output 451 ml   Net 269 ml         Physical Exam  Constitutional:       Appearance: She is well-developed.   HENT:      Head: Normocephalic and atraumatic.   Eyes:      Conjunctiva/sclera: Conjunctivae normal.      Pupils: Pupils are equal, round, and reactive to light.   Neck:      Thyroid: No thyromegaly.      Vascular: No JVD.   Cardiovascular:      Rate and Rhythm: Normal rate and regular rhythm.       Heart sounds: Normal heart sounds. No murmur heard.    No friction rub. No gallop.   Pulmonary:      Effort: Pulmonary effort is normal.      Breath sounds: Normal breath sounds. No wheezing or rales.   Abdominal:      General: Bowel sounds are normal. There is no distension.      Palpations: Abdomen is soft.      Tenderness: There is no abdominal tenderness. There is no guarding or rebound.   Musculoskeletal:         General: No tenderness. Normal range of motion.      Cervical back: Neck supple.   Skin:     General: Skin is warm and dry.   Neurological:      Mental Status: She is oriented to person, place, and time. She is lethargic and confused.      Cranial Nerves: No cranial nerve deficit.      Comments: + asterixis   Psychiatric:         Behavior: Behavior normal.           Significant Labs: All pertinent labs within the past 24 hours have been reviewed.    Significant Imaging: I have reviewed all pertinent imaging results/findings within the past 24 hours.

## 2023-06-17 NOTE — NURSING
Bedside handoff report completed pt laying in bed in supine position with eyes closed easily arouses to voice stimuli no distress noted at this time no voice complaint of pain at this time safety precautions maintained.

## 2023-06-17 NOTE — PROGRESS NOTES
[facility] - Telemetry [facility] Medicine  Progress Note    Patient Name: Mariann Huff  MRN: [id number]  Patient Class: IP- Inpatient   Admission Date: 6/9/2023  Length of Stay: 8 days  Attending Physician: Zoë Treadwell MD  Primary Care Provider: Jasbir Haney MD        Subjective:     Principal Problem:<principal problem not specified>        HPI:  63F with PMH of CAD with GINGER , HTN, HLD, DM2, MURTAZA (not on CPAP), PAD, Afib and hx of DVT on Eliquis and recent admission for pancreatitis wo presents to the ED with nausea and vomiting. Patient recently admitted 05/25 to 05/26 for first episode of acute pancreatitis. Lipase >2000 at that time. CT abdomen and US without evidence of biliary obstruction or pancreatic inflammation. Lipid panel wnl. Patient pain and n/v improved with treatment for acute pancreatitis felt secondary to home med Trulicity. She was discharged with new insulin regimen which she reports she never picked up from pharmacy so has been taking farxiga only at home. States that she has had progressively worsening n/v and po intolerance. States that she is not having severe pain similar to recent pancreatitis. Denies fevers, chills, chest pain, shortness of breath.     In the ED patient afebrile and hemodynamically stable saturating well on room air. Lipase >2000. BG >700 and serum osmo 326 after 1L IVFs. B-hydroxybutyrate 1.3. pH 7.4 and bicarb 34. AG 12. Patient started on aggressive IVFs and insulin gtt for management on pancreatitis and HHS. Admitted to the care of medicine for further evaluation and management.       Overview/Hospital Course:  Patient transitioned from insulin drip to subcutaneous insulin on 06/10.  On 06/11, patient developed new hyperbilirubinemia and worsening liver enzymes and given history of pancreatitis concern for possible choledocholithiasis.  MRCP ordered did not demonstrate any focal obstruction showever liver enzymes and alk phos increasing in size. In speaking  with [facility], patient to be off apixaban and pletal prior to ERCP.      Interval History:   6/17: PAPA worsening - thought ATN     Review of Systems   Constitutional:  Negative for chills and fever.   HENT:  Negative for congestion and sore throat.    Eyes:  Negative for photophobia and visual disturbance.   Respiratory:  Negative for cough and shortness of breath.    Cardiovascular:  Negative for chest pain and palpitations.   Gastrointestinal:  Negative for abdominal pain, constipation, diarrhea, nausea and vomiting.   Endocrine: Negative for cold intolerance and heat intolerance.   Genitourinary:  Negative for dysuria and hematuria.   Musculoskeletal:  Negative for arthralgias and myalgias.   Skin:  Negative for rash.   Allergic/Immunologic: Negative for environmental allergies and food allergies.   Neurological:  Negative for dizziness, seizures, syncope and headaches.   Hematological:  Negative for adenopathy. Does not bruise/bleed easily.   Psychiatric/Behavioral:  Negative for hallucinations and suicidal ideas.    Objective:     Vital Signs (Most Recent):  Temp: 98.1 °F (36.7 °C) (06/17/23 1141)  Pulse: 75 (06/17/23 1141)  Resp: 18 (06/17/23 1141)  BP: (!) 116/59 (06/17/23 1141)  SpO2: 96 % (06/17/23 1400) Vital Signs (24h Range):  Temp:  [97.6 °F (36.4 °C)-98.7 °F (37.1 °C)] 98.1 °F (36.7 °C)  Pulse:  [72-87] 75  Resp:  [18] 18  SpO2:  [92 %-96 %] 96 %  BP: ()/(50-72) 116/59     Weight: 62.8 kg (138 lb 7.2 oz)  Body mass index is 23.76 kg/m².    Intake/Output Summary (Last 24 hours) at 6/17/2023 1416  Last data filed at 6/17/2023 1149  Gross per 24 hour   Intake 720 ml   Output 451 ml   Net 269 ml         Physical Exam  Constitutional:       Appearance: She is well-developed.   HENT:      Head: Normocephalic and atraumatic.   Eyes:      Conjunctiva/sclera: Conjunctivae normal.      Pupils: Pupils are equal, round, and reactive to light.   Neck:      Thyroid: No thyromegaly.      Vascular: No JVD.    Cardiovascular:      Rate and Rhythm: Normal rate and regular rhythm.      Heart sounds: Normal heart sounds. No murmur heard.    No friction rub. No gallop.   Pulmonary:      Effort: Pulmonary effort is normal.      Breath sounds: Normal breath sounds. No wheezing or rales.   Abdominal:      General: Bowel sounds are normal. There is no distension.      Palpations: Abdomen is soft.      Tenderness: There is no abdominal tenderness. There is no guarding or rebound.   Musculoskeletal:         General: No tenderness. Normal range of motion.      Cervical back: Neck supple.   Skin:     General: Skin is warm and dry.   Neurological:      Mental Status: She is oriented to person, place, and time. She is lethargic and confused.      Cranial Nerves: No cranial nerve deficit.      Comments: + asterixis   Psychiatric:         Behavior: Behavior normal.           Significant Labs: All pertinent labs within the past 24 hours have been reviewed.    Significant Imaging: I have reviewed all pertinent imaging results/findings within the past 24 hours.      Assessment/Plan:      Hyperbilirubinemia  Given worsening liver enzymes, alk-phos, bilirubin concern for ongoing obstructive process.  - [facility] consulted, in speaking with them plan for EUS of pancreas on 06/15 as patient did not have her Pletal held as well.  Apixaban held in preparation      History of DVT (deep vein thrombosis)  - hold home Eliquis      Hyperosmolar hyperglycemic state (HHS)  IP HHS/DKA Pathway initiated. On presentation BG >700 and serum osmo 326 after 1L IVFs. B-hydroxybutyrate 1.3. pH 7.4 and bicarb 34. AG 12. Patient was started on insulin drip on admission but later in the day, patient transitioned to Levemir 14 units at night and aspart 3 units with meals.  Started on diabetic diet  - patient currently on Levemir 20 units nightly, 10 units aspart with meals  - further insulin titration per endocrinology  - POC AC/HS      Acute recurrent  "pancreatitis  Lipase >2000 on presentation, downtrending without any identifiable cause for elevation. Patient without any abdominal pain. Discontinue HCTZ as it could potentially be cause of pancreatitis as well  - US abdomen with gallbladder sludge and "prominence" of the pancreatic duct   - liver enzymes, alk-phos, bilirubin elevated on 06/11, suggestive of possible choledocholithiasis as cause of pancreatitis.  MRCP ordered  - MRCP on my personal interpretation without any focal obstruction or retained stones, however liver enzymes continue to elevate  - [facility] was consulted for findings and after discussion, recommended ERCP/EUS once off apixaban and pletal        PAD (peripheral artery disease)  - hold pletal, ASA, and statin  - started on apixaban for PAD outpatient as well, holding in setting of upcoming procedure on 6/14      Asthma  - albuterol prn      Acute encephalopathy  Perhaps related to sepsis.  Patient on 6/14 a.m., increasingly lethargic.  Per , last night she had jerking movements when attempting to go to the restroom with assistance.  On exam this morning she had asterixis and required sternal rubbing to awaken her.  - Ammonia was ordered and was normal for the past 2 days, patient has not had a bowel movement in over a week per the patient's .  We will give lactulose enema regardless of ammonia level  - VBG ordered, personal interpretation reflects no evidence of hypercapnic respiratory failure  - blood cultures were collected yesterday x2, no growth today  - escalated patient's antibiotics from ceftriaxone and metronidazole to Zosyn given her increasing white blood cell count      Sepsis with acute liver failure without hepatic coma or septic shock  This patient does have evidence of infective focus  My overall impression is sepsis.  Source: Abdominal and potentially bacteremia  Antibiotics given-   Antibiotics (72h ago, onward)    Start     Stop Route Frequency Ordered    06/14/23 " 1100  piperacillin-tazobactam (ZOSYN) 4.5 g in dextrose 5 % in water (D5W) 5 % 100 mL IVPB (MB+)         -- IV Every 8 hours (non-standard times) 06/14/23 0959        Latest lactate reviewed-  Recent Labs   Lab 06/13/23  0813   LACTATE 1.3     Organ dysfunction indicated by Acute liver injury    Fluid challenge Not needed - patient is not hypotensive      Post- resuscitation assessment No - Post resuscitation assessment not needed       Will Not start Pressors- Levophed for MAP of 65  Source control achieved by: Zosyn and potential [facility] procedure on 6/15    PAPA (acute kidney injury)  Creatinine 1.7 on presentation ; baseline 1.0  - IVFs stopped, recurrent PAPA on 6/12 with FeNA indication pre-renal disease. 1L LR bolus ordered over 3 hrs  - worsening renal function in setting of hypotension on 6/15      Hyperlipidemia associated with type 2 diabetes mellitus  Given rise in liver enzymes, discontinuing atorvastatin    Hypertension associated with diabetes  Discontinued all antihypertensive medications given worsening blood pressure and concerns for sepsis      VTE Risk Mitigation (From admission, onward)    None          Discharge Planning   REBECCA: 6/19/2023     Code Status: Full Code   Is the patient medically ready for discharge?: No    Reason for patient still in hospital (select all that apply): Patient trending condition  Discharge Plan A: Home with family   Discharge Delays: None known at this time              Zoë Treadwell MD  Department of Hospital Medicine   [facility] - Telemetry Stepdown

## 2023-06-17 NOTE — CONSULTS
GENERAL SURGERY  Consultation      REASON FOR CONSULT:  Biliary pancreatitis     SUBJECTIVE:     HISTORY OF PRESENT ILLNESS:  Mariann Huff is a 63 y.o. female PMH CAD with GINGER , HTN, HLD, DM2, MURTAZA (not on CPAP), PAD, Afib and hx of DVT on Eliquis, biliary pancreatitis presented to the hospital on 6/15/23 with a recurrent episode of pancreatitis. She was admitted to hospital medicine for pancreatitis and HHS. Started on fluids for her pancreatitis. Initially on insulin gtt, transitioned to subcutaneous insulin on 6/10. On 6/11 developed hyperbilirubinemia and transaminitis, underwent MRCP which was unrevealing. GI consulted, performed EUS/ERCP on 6/15/23. EUS significant for rebecca-pancreatic inflammation from acute pancreatitis. ERCP with localized biliary stricture in lower third of main bile duct, sphincterotomy performed and biliary tree swept. Plastic stent placed in common bile duct.     She notes she is feeling slightly better today, mild epigastric pain persists. No fevers.     General Surgery has been consulted for evaluation for cholecystectomy.    The patient's past surgical history is pertinent for prior laparotomy for left ureteral injury during neurosurgical procedure, requiring a Boari flap, left ureteral stent placement, psoas hitch and lysis of adhesions (2/19/20)      MEDICATIONS:  Home Medications:  Current Facility-Administered Medications on File Prior to Encounter   Medication Dose Route Frequency Provider Last Rate Last Admin    0.9%  NaCl infusion   Intravenous Continuous Aime George  mL/hr at 10/27/22 0842 New Bag at 10/27/22 0842     Current Outpatient Medications on File Prior to Encounter   Medication Sig Dispense Refill    amLODIPine (NORVASC) 10 MG tablet Take 1 tablet (10 mg total) by mouth once daily. 90 tablet 2    aspirin 81 mg Tab Take 81 mg by mouth once daily. 1 Tablet Oral Every day      atorvastatin (LIPITOR) 40 MG tablet Take 1 tablet (40 mg total) by mouth every  SUBJECTIVE:  She is here for follow up check on her IUD.  She has had one period.  She denies fever, chills and pain.    OBJECTIVE:  External genitalia: No enlarged inguinal nodes.  The Bartholin, urethral and skene glands are normal.  Vagina: There are no visible lesions.  There is no abnormal discharge.  Cervix: The Squamocolumnar junction is visualized and without lesions. Two IUD strings were seen.  Uterus: Normal size and nontender to palpation.    ASSESSMENT:   IUD Surveillance  Influenza vaccine    PLAN:  Influenza vaccine  Follow up in 1 year.   "evening. 90 tablet 3    dapagliflozin (FARXIGA) 10 mg tablet Take 1 tablet (10 mg total) by mouth once daily. 90 tablet 3    diclofenac sodium (VOLTAREN) 1 % Gel Apply 2 g topically 3 (three) times daily. Apply to the area of pain 2-3x per day or night as needed 100 g 3    EScitalopram oxalate (LEXAPRO) 10 MG tablet Take 1 tablet (10 mg total) by mouth once daily. 90 tablet 2    gabapentin (NEURONTIN) 300 MG capsule Take 1 capsule (300 mg) in the morning and 2 capsules (600 mg) in the evening 90 capsule 0    glipiZIDE (GLUCOTROL) 10 MG TR24 Take 1 tablet (10 mg total) by mouth daily with breakfast. 90 tablet 3    isosorbide mononitrate (ISMO,MONOKET) 10 mg tablet Take 1 tablet (10 mg total) by mouth once daily. 90 tablet 2    telmisartan (MICARDIS) 80 MG Tab Take 1 tablet (80 mg total) by mouth once daily. Stop losartan 90 tablet 2    ACCU-CHEK EDIN PLUS METER Misc       albuterol (PROVENTIL/VENTOLIN HFA) 90 mcg/actuation inhaler Inhale 2 puffs into the lungs every 6 (six) hours as needed for Wheezing. 1 g 3    blood sugar diagnostic (ACCU-CHEK EDIN PLUS TEST STRP) Strp TEST BLOOD SUGARS 4 TIMES DAILY 150 strip 11    cilostazoL (PLETAL) 100 MG Tab Take 1 tablet (100 mg total) by mouth 2 (two) times daily. (Patient not taking: Reported on 6/9/2023) 60 tablet 11    insulin detemir U-100, Levemir, (LEVEMIR FLEXPEN) 100 unit/mL (3 mL) InPn pen Inject 20 Units into the skin every evening. 18 mL 3    multivitamin (THERAGRAN) per tablet Take 1 tablet by mouth once daily.      pen needle, diabetic (NOVOTWIST) 32 gauge x 1/5" Ndle Use 1 needle to inject insulin four times daily 400 each 2     Inpatient Medications:   famotidine  20 mg Oral Daily    insulin aspart U-100  6 Units Subcutaneous TIDWM    insulin detemir U-100  20 Units Subcutaneous QHS    midodrine  10 mg Oral TID WM    piperacillin-tazobactam (Zosyn) IV (PEDS and ADULTS) (extended infusion is not appropriate)  4.5 g Intravenous Q12H     Infusions:  PRN " Medications:acetaminophen, albuterol, dextrose 10%, dextrose 10%, dextrose, dextrose, glucagon (human recombinant), insulin aspart U-100, melatonin, ondansetron, prochlorperazine, sodium chloride 0.9%, Pharmacy to dose Vancomycin consult **AND** vancomycin - pharmacy to dose    ALLERGIES:    Review of patient's allergies indicates:   Allergen Reactions    Milk containing products (dairy)      Causes GI distress       PAST MEDICAL HISTORY:    Past Medical History:   Diagnosis Date    Anticoagulant long-term use     Asthma     Back pain     Bradycardia, unspecified 5/8/2019    The etiology of the bradycardic episode is unclear.  The have appear to be respiratory in origin (at least the 1st episode).  We will continue to monitor carefully.  We are awaiting evaluation by Cardiology.      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 OR [address] [place] [address];  Service: Urology;  Laterality: Left;    BRONCHOSCOPY N/A 5/2/2019    Procedure: BRONCHOSCOPY;  Surgeon: Sean Ruano MD;  Location: [facility] OR [address] [place] [address];  Service: General;  Laterality: N/A;    CARDIAC CATHETERIZATION      CATARACT EXTRACTION      cataract extraction left  eye      cataracts      CAUDAL EPIDURAL STEROID INJECTION N/A 2019    Procedure: Injection-steroid-epidural-caudal;  Surgeon: Dave Bentley Jr., MD;  Location: [facility] PAIN MGT;  Service: Pain Management;  Laterality: N/A;     SECTION, LOW TRANSVERSE      COLONOSCOPY N/A 2019    Procedure: COLONOSCOPY;  Surgeon: Al Alaniz MD;  Location: [facility] ENDO (2ND FLR);  Service: Endoscopy;  Laterality: N/A;    CORONARY ANGIOPLASTY      CYSTOGRAM N/A 2019    Procedure: CYSTOGRAM INTRAOP;  Surgeon: Robin Boyd MD;  [facility];  Service: Urology;  Laterality: N/A;  1 HOUR    CYSTOSCOPY W/ RETROGRADES Left 2019    Procedure: CYSTOSCOPY, WITH RETROGRADE PYELOGRAM;  Surgeon: Robin Boyd MD;  [facility];  Service: Urology;  Laterality: Left;  fluro    ENDOSCOPIC ULTRASOUND OF UPPER GASTROINTESTINAL TRACT N/A 6/15/2023    Procedure: ULTRASOUND, UPPER GI TRACT, ENDOSCOPIC;  Surgeon: Lisa Kim MD;  [facility]  Service: Endoscopy;  Laterality: N/A;    ERCP N/A 6/15/2023    Procedure: ERCP (ENDOSCOPIC RETROGRADE CHOLANGIOPANCREATOGRAPHY);  Surgeon: Lisa Kim MD;  [facility]  Service: Endoscopy;  Laterality: N/A;    ESOPHAGOGASTRODUODENOSCOPY W/ PEG  2019    Procedure: EGD, WITH PEG TUBE INSERTION;  Surgeon: Sean Ruano MD;  [facility];  Service: General;;    EXCISION TURBINATE, SUBMUCOUS      FLEXIBLE SIGMOIDOSCOPY N/A 2019    Procedure: SIGMOIDOSCOPY, FLEXIBLE;  Surgeon: ALBERTO Amin MD;  Location: [facility] ENDO (2ND FLR);  Service: Endoscopy;  Laterality: N/A;    FLEXIBLE SIGMOIDOSCOPY N/A 2019    Procedure: SIGMOIDOSCOPY, FLEXIBLE;  Surgeon: ALBERTO Amin MD;  Location: [facility] ENDO (2ND FLR);  Service: Endoscopy;  Laterality: N/A;    FUSION OF LUMBAR SPINE BY ANTERIOR APPROACH Left 2019    Procedure: FUSION, SPINE, LUMBAR, ANTERIOR APPROACH Left L5-S1 Anterior to Psoa  Interbody Fusion, L5-S1 Posterior Instrumentation;  Surgeon: Mk George MD;  [facility];  Service: Neurosurgery;  Laterality: Left;  Porcedure:  Left L5-S1 Anterior to Psoa Interbody Fusion, L5-S1 Posterior Instrumentation  Surgery Time: 4 Hrs  LOS: 2-3  Anesthesia: General   Blood: Type & Screen  R    HAND SURGERY Left     HAND SURGERY Right     torn ligament repair/ Dr. Yeboah    HYSTERECTOMY      INJECTION OF STEROID Right 12/10/2020    Procedure: INJECTION, STEROID Right SI Joint Block and Steroid Injection;  Surgeon: Mk George MD;  Location: [facility] OR;  Service: Neurosurgery;  Laterality: Right;  Procedure: Right SI Joint Block and Steroid Injection   SUrgery Time: 30 Min  LOS: 0  Anesthesia: MAC  Radiology: C-arm  Bed: [place] 4 Poster  Position: Prone    INJECTION OF STEROID Right 9/28/2021    Procedure: INJECTION, STEROID Right SI joint block & steroid injection;  Surgeon: Mk George MD;  Location: [facility];  Service: Neurosurgery;  Laterality: Right;  Procedure: Right SI joint block & steroid injection  Surgery Time: 30m  Anesthesia: Local MAC  Radiology: C-arm  Bed: Regular  Position: Prone    left foot surgery      left wrist surgery      LYSIS OF ADHESIONS N/A 2/19/2020    Procedure: LYSIS, ADHESIONS;  Surgeon: Robin Boyd MD;  [address]  Service: Urology;  Laterality: N/A;    NASAL SEPTUM SURGERY  5/7/15    PERCUTANEOUS NEPHROSTOMY Left 4/21/2019    Procedure: Creation, Nephrostomy, Percutaneous;  Surgeon: Karina Surgeon;  Location: [facility];  Service: Anesthesiology;  Laterality: Left;    REPAIR OF URETER  4/12/2019    Procedure: REPAIR, URETER;  Surgeon: Mk George MD;  [facility];  Service: Neurosurgery;;    REPLACEMENT OF NEPHROSTOMY TUBE N/A 7/18/2019    Procedure: REPLACEMENT, NEPHROSTOMY TUBE;  Surgeon: Leo Diagnostic Provider;  [facility];  Service: Anesthesiology;  Laterality: N/A;  188    REPLACEMENT OF  NEPHROSTOMY TUBE N/A 7/24/2019    Procedure: REPLACEMENT, NEPHROSTOMY TUBE;  Surgeon: [facility] Diagnostic Provider;  Location: [facility] OR [facility] FLR;  Service: Anesthesiology;  Laterality: N/A;  188    REPLACEMENT OF NEPHROSTOMY TUBE N/A 10/7/2019    Procedure: REPLACEMENT, NEPHROSTOMY TUBE;  Surgeon: [facility] Diagnostic Provider;  [facility];  Service: Anesthesiology;  Laterality: N/A;  189    REPLACEMENT OF NEPHROSTOMY TUBE N/A 11/25/2019    Procedure: REPLACEMENT, NEPHROSTOMY TUBE;  Surgeon: [facility] Diagnostic Provider;  [facility];  Service: Anesthesiology;  Laterality: N/A;  Room 188/Bessy    REPLACEMENT OF NEPHROSTOMY TUBE Right 2/19/2020    Procedure: REPLACEMENT, NEPHROSTOMY TUBE removal removal;  Surgeon: Robin Boyd MD;  [facility];  Service: Urology;  Laterality: Right;    rt elbow surgery      S/P LAD COATED STENT  05/14/2010    6 total     S/P OM1 STENT  08/2003    SINUS SURGERY      F.E.S.S.    TRACHEOSTOMY N/A 5/2/2019    Procedure: CREATION, TRACHEOSTOMY;  Surgeon: Sean Ruano MD;  Location: [facility] OR [address] [place] [address];  Service: General;  Laterality: N/A;    TUBAL LIGATION      URETERAL REIMPLANTION Left 2/19/2020    Procedure: REIMPLANTATION, URETER WITH PSOAS HITCH;  Surgeon: Robin Boyd MD;  Location: [facility] OR [address] [place] [address];  Service: Urology;  Laterality: Left;       FAMILY HISTORY:  Family History   Problem Relation Age of Onset    Diabetes Mother     Heart disease Mother     Diabetes Father     Leukemia Father         leukemia    Heart attack Father     Diabetes Sister     Diabetes Brother     Diabetes Sister     No Known Problems Sister     No Known Problems Brother     No Known Problems Brother     No Known Problems Maternal Grandmother     No Known Problems Maternal Grandfather     No Known Problems Paternal Grandmother     No Known Problems Paternal Grandfather     No Known Problems Son     No Known Problems Son     No Known Problems Maternal Aunt     No Known Problems  Maternal Uncle     No Known Problems Paternal Aunt     No Known Problems Paternal Uncle     Colon cancer Neg Hx     Inflammatory bowel disease Neg Hx     Melanoma Neg Hx     Psoriasis Neg Hx     Lupus Neg Hx     Eczema Neg Hx     Acne Neg Hx     Amblyopia Neg Hx     Blindness Neg Hx     Cancer Neg Hx     Cataracts Neg Hx     Glaucoma Neg Hx     Hypertension Neg Hx     Macular degeneration Neg Hx     Retinal detachment Neg Hx     Strabismus Neg Hx     Stroke Neg Hx     Thyroid disease Neg Hx     Heart failure Neg Hx     Hyperlipidemia Neg Hx        SOCIAL HISTORY:  Social History     Tobacco Use    Smoking status: Never    Smokeless tobacco: Never   Substance Use Topics    Alcohol use: No     Alcohol/week: 0.0 standard drinks    Drug use: No        REVIEW OF SYSTEMS:  A 10-point review of systems is negative except for the above mentioned in the HPI.    OBJECTIVE:     Most Recent Vitals:  Temp: 97.9 °F (36.6 °C) (06/16/23 1605)  Pulse: 87 (06/16/23 1605)  Resp: 18 (06/16/23 1605)  BP: (!) 160/72 (06/16/23 1605)  SpO2: 96 % (06/16/23 1605)    24-Hour Vitals:  Temp:  [97.8 °F (36.6 °C)-98.7 °F (37.1 °C)]   Pulse:  []   Resp:  [14-20]   BP: ()/(49-77)   SpO2:  [95 %-100 %]     24-Hour I&O:  Intake/Output Summary (Last 24 hours) at 6/16/2023 1922  Last data filed at 6/16/2023 1600  Gross per 24 hour   Intake 1269.83 ml   Output 500 ml   Net 769.83 ml       PHYSICAL EXAM:  AAO, NAD, well developed and well nourished.  Head normocephalic, atraumatic.  Trachea midline, neck supple.  Respirations unlabored with good inspiratory effort.  Heart regular rate and rhythm.  Abdomen soft, obese, nondistended, tender to palpation in epigastrium       LABORATORY VALUES:  Recent Labs   Lab 06/14/23  0354 06/15/23  0354 06/16/23  0347   WBC 21.37* 11.89 8.44   HGB 9.9* 8.9* 8.3*   HCT 31.5* 29.6* 27.3*    224 247     Recent Labs   Lab 06/14/23  0354 06/14/23 2238 06/15/23  0354 06/16/23  0347    137 136 142    K 4.3 3.9 3.7 3.3*    99 100 108   CO2 22* 23 22* 23   BUN 19 28* 29* 28*   CREATININE 1.2 2.0* 2.5* 2.7*   * 247* 287* 144*   CALCIUM 9.0 8.4* 9.1 8.6*   MG 2.2 2.3 2.3 2.3   PHOS 3.9  --  2.7 2.3*   *  --  304* 224*   *  --  221* 160*   ALKPHOS 1,017*  --  1,014* 1,056*   BILITOT 5.9*  --  6.5* 7.8*   PROT 5.9*  --  6.0 5.5*   ALBUMIN 1.9*  --  1.7* 1.5*     Recent Labs   Lab 06/14/23  2238   LACTATE 1.6     Recent Labs   Lab 06/14/23  0943   PH 7.389   PCO2 44.0   PO2 55   HCO3 26.6       DIAGNOSTIC STUDIES:  US: Reviewed  CT: Reviewed    ASSESSMENT:     Mariann Huff is a 63 y.o. female PMH CAD with GINGER , HTN, HLD, DM2, MURTZAA (not on CPAP), PAD, Afib and hx of DVT on Eliquis, biliary pancreatitis admitted to [facility] on 6/9/2023 for biliary pancreatitis. Now s/p ERCP on 6/15/23 with sphincterotomy/biliary tree swept and stent placed in CBD. General Surgery consulted for evaluation for cholecystectomy.       PLAN:  Laparoscopic cholecystectomy this admission, tentatively Monday   Continue to hold Eliquis   Trend Tbili   Will obtain informed consent  Ok for diet, NPO night prior to procedure  Rest of care per primary           Hollie Silverio MD  General Surgery

## 2023-06-17 NOTE — PROGRESS NOTES
"[facility] - Telemetry Stepdown  Endocrinology  Progress Note    Admit Date: 2023     63 year old  female with type 2 diabetes mellitus, CKD, CAD, hypertension, hyperlipidemia, MURTAZA (not on CPAP), PAD, Afib and hx of DVT on Eliquis and recent admission for pancreatitis who presents with HHS and developing possible biliary obstruction.     - Patient recently admitted  to  for acute pancreatitis attributed to Trulicity, which was discontinued upon d/c. Per pt's PCP this was 2nd episode of pancreatitis while on trulicity -- prior episode when increasing dose of Trulicity.   Pt states upon d/c  she was feeling slight improvement but still having intermittent n/v and not tolerating normal diet. N/V began to get worse in last few days which prompted admission. No abdominal pain on presentation for current admission, unlike last admit when she had severe abd pain for pancreatitis.    In the ED patient afebrile and hemodynamically stable saturating well on room air. Lipase >2000. BG >700 and serum osmo 326 after 1L IVFs. B-hydroxybutyrate 1.3. pH 7.4 and bicarb 34. AG 12. Patient started on aggressive IVFs and insulin gtt for HHS.     - Endocrinology consulted for initial HHS and glucose management      Regarding Type 2 Diabetes Mellitus:    - Initially diagnosed with Type 2 diabetes mellitus: pt not sure - states "long time ago"; at least as far back as  per review of elevated a1c in epic  - Home diabetic medications include: Farxiga 10mg qd, Glipizide ER 10mg daily, Levemir 20u qhs (but never started). She has been on MDI previously, most recently she was on Toujeo and Humalog discontinued 2021 but she states she was on insulin up until about 10mo ago.  - Family History: Not asked  - Weight based dosin kg x 0.4 (CKD) = 25 TDD x 0.5 = 12 basal / 12 prandial  - 1700/TDD = 68 (estimated insulin sensitivity factor)  - 450/TDD = 18 (estimated starting carb ratio for prandial " "dosing)    Lab Results   Component Value Date    HGBA1C 8.1 (H) 05/25/2023    HGBA1C 8.2 (H) 04/05/2023    HGBA1C 8.8 (H) 03/29/2023    CPEPTIDE 1.36 06/12/2017     Lab Results   Component Value Date    EGFRNORACEVR 16.4 (A) 06/17/2023    EGFRNORACEVR 19.3 (A) 06/16/2023    EGFRNORACEVR 21.2 (A) 06/15/2023       Interval HPI:   Overnight events:  No acute events reported overnight  Eating:   <25%  Nausea: No  Hypoglycemia and intervention: No  Fever: No  TPN and/or TF: No  If yes, type of TF/TPN and rate: NA    BP (!) 120/59   Pulse 72   Temp 98.6 °F (37 °C)   Resp 18   Ht 5' 4" (1.626 m)   Wt 62.8 kg (138 lb 7.2 oz)   LMP  (LMP Unknown)   SpO2 (!) 93%   Breastfeeding No   BMI 23.76 kg/m²     Labs Reviewed and Include    Recent Labs   Lab 06/17/23  0232      CALCIUM 9.9   ALBUMIN 1.6*   PROT 6.4      K 3.7   CO2 21*      BUN 34*   CREATININE 3.1*   ALKPHOS 1,237*   *   *   BILITOT 9.2*     Lab Results   Component Value Date    WBC 8.26 06/17/2023    HGB 9.4 (L) 06/17/2023    HCT 29.6 (L) 06/17/2023    MCV 83 06/17/2023     06/17/2023     No results for input(s): TSH, FREET4 in the last 168 hours.  Lab Results   Component Value Date    HGBA1C 8.1 (H) 05/25/2023       Nutritional status:   Body mass index is 23.76 kg/m².  Lab Results   Component Value Date    ALBUMIN 1.6 (L) 06/17/2023    ALBUMIN 1.5 (L) 06/16/2023    ALBUMIN 1.7 (L) 06/15/2023     No results found for: PREALBUMIN    Estimated Creatinine Clearance: 16.2 mL/min (A) (based on SCr of 3.1 mg/dL (H)).    Accu-Checks  Recent Labs     06/14/23 1952 06/15/23  0806 06/15/23  1104 06/15/23  1708 06/15/23  1956 06/16/23  0758 06/16/23  1236 06/16/23  1604 06/16/23  2202 06/17/23  0741   POCTGLUCOSE 242* 294* 216* 188* 161* 129* 150* 170* 163* 75       Current Medications and/or Treatments Impacting Glycemic Control  Immunotherapy:    Immunosuppressants       None          Steroids:   Hormones (From admission, " onward)      Start     Stop Route Frequency Ordered    06/09/23 2028  melatonin tablet 6 mg         -- Oral Nightly PRN 06/09/23 1931          Pressors:    Autonomic Drugs (From admission, onward)      Start     Stop Route Frequency Ordered    06/15/23 1200  midodrine tablet 10 mg         -- Oral 3 times daily with meals 06/15/23 0940          Hyperglycemia/Diabetes Medications:   Antihyperglycemics (From admission, onward)      Start     Stop Route Frequency Ordered    06/17/23 2100  insulin detemir U-100 (Levemir) pen 18 Units         -- SubQ Nightly 06/17/23 0802    06/17/23 0715  insulin aspart U-100 pen 3 Units         -- SubQ 3 times daily with meals 06/17/23 0632    06/10/23 1315  insulin regular in 0.9 % NaCl 100 unit/100 mL (1 unit/mL) infusion        Question:  Enter initial dose (Units/hr):  Answer:  0.6    06/10 2200 IV Continuous 06/10/23 1211    06/10/23 1309  insulin aspart U-100 pen 0-5 Units         -- SubQ As needed (PRN) 06/10/23 1211            ASSESSMENT and PLAN    Cardiac/Vascular  Hyperlipidemia associated with type 2 diabetes mellitus  - On Lipitor 40mg and Repatha outpatient  - Management per primary    Renal/  PAPA (acute kidney injury)  - Improved/resolved. Likely 2/2 IVVD 2/2 HHS on admission  - Management per primary    Endocrine  Hyperosmolar hyperglycemic state (HHS)  - HHS on admission. Recent pancreatitis admission, not yet back to normal po intake with worsening hepatic function. Taking Glipizide and Farxiga at home. Never picked up Levemir Rx recently prescribed by her PCP  - Noted plan for laparoscopic cholecystectomy tentatively planned for 06/19/2023  - 24 hour glucose trend: mid to low 100s, worsening renal function with decreased insulin clearance increasing risk of hypoglycemia    - Decrease Levemir from 20 down to 16 units at night starting this evening to prevent hypoglycemia, will likely decrease further tomorrow evening in anticipation of laparoscopic cholecystectomy  06/19/2023  - Once appetite improves, resume aspart at lower dose of 3 units TIDAC+LDC    - If blood glucose greater than 300, please ask patient not to eat food or drink anything other than water until correctional insulin has brought it back below 250    GI  History of pancreatitis  - She has now had two episodes of pancreatitis on Trulicity, however suspicion for choledocholithiasis as cause for pancreatitis  - Laparoscopic cholecystectomy tentatively planned for 06/19/2023  - Hold Trulicity at discharge        Mario Alberto Carroll DO  [facility], 6th Floor  [address] [place], [place], [address]    Office: [phone]  Fax: [phone]    Disclaimer: This note has been generated using voice-recognition software. There may be typographical errors that have been missed during proof-reading.    The above history labs imaging impression and plan were discussed with attending physician who is in agreement and also took part in this patient's care.  I personally reviewed all of the patients available medications, labs, imaging, vitals, allergies, medical history.

## 2023-06-17 NOTE — PROGRESS NOTES
VANCOMYCIN DOSING BY PHARMACY DISCONTINUATION NOTE    Mariann Huff is a 63 y.o. female who had been consulted for vancomycin dosing.    The pharmacy consult for vancomycin dosing has been discontinued.     Vancomycin Dosing by Pharmacy Consult will sign-off. Please reconsult if necessary. Thank you for allowing us to participate in this patient's care.     Ly Smith, PharmD, BCPS  X (secure chat)

## 2023-06-17 NOTE — SUBJECTIVE & OBJECTIVE
"Interval HPI:   Overnight events:  No acute events reported overnight  Eating:   <25%  Nausea: No  Hypoglycemia and intervention: No  Fever: No  TPN and/or TF: No  If yes, type of TF/TPN and rate: NA    BP (!) 120/59   Pulse 72   Temp 98.6 °F (37 °C)   Resp 18   Ht 5' 4" (1.626 m)   Wt 62.8 kg (138 lb 7.2 oz)   LMP  (LMP Unknown)   SpO2 (!) 93%   Breastfeeding No   BMI 23.76 kg/m²     Labs Reviewed and Include    Recent Labs   Lab 06/17/23  0232      CALCIUM 9.9   ALBUMIN 1.6*   PROT 6.4      K 3.7   CO2 21*      BUN 34*   CREATININE 3.1*   ALKPHOS 1,237*   *   *   BILITOT 9.2*     Lab Results   Component Value Date    WBC 8.26 06/17/2023    HGB 9.4 (L) 06/17/2023    HCT 29.6 (L) 06/17/2023    MCV 83 06/17/2023     06/17/2023     No results for input(s): TSH, FREET4 in the last 168 hours.  Lab Results   Component Value Date    HGBA1C 8.1 (H) 05/25/2023       Nutritional status:   Body mass index is 23.76 kg/m².  Lab Results   Component Value Date    ALBUMIN 1.6 (L) 06/17/2023    ALBUMIN 1.5 (L) 06/16/2023    ALBUMIN 1.7 (L) 06/15/2023     No results found for: PREALBUMIN    Estimated Creatinine Clearance: 16.2 mL/min (A) (based on SCr of 3.1 mg/dL (H)).    Accu-Checks  Recent Labs     06/14/23  1952 06/15/23  0806 06/15/23  1104 06/15/23  1708 06/15/23  1956 06/16/23  0758 06/16/23  1236 06/16/23  1604 06/16/23  2202 06/17/23  0741   POCTGLUCOSE 242* 294* 216* 188* 161* 129* 150* 170* 163* 75       Current Medications and/or Treatments Impacting Glycemic Control  Immunotherapy:    Immunosuppressants       None          Steroids:   Hormones (From admission, onward)      Start     Stop Route Frequency Ordered    06/09/23 2028  melatonin tablet 6 mg         -- Oral Nightly PRN 06/09/23 1931          Pressors:    Autonomic Drugs (From admission, onward)      Start     Stop Route Frequency Ordered    06/15/23 1200  midodrine tablet 10 mg         -- Oral 3 times daily with " meals 06/15/23 0940          Hyperglycemia/Diabetes Medications:   Antihyperglycemics (From admission, onward)      Start     Stop Route Frequency Ordered    06/17/23 2100  insulin detemir U-100 (Levemir) pen 18 Units         -- SubQ Nightly 06/17/23 0802    06/17/23 0715  insulin aspart U-100 pen 3 Units         -- SubQ 3 times daily with meals 06/17/23 0632    06/10/23 1315  insulin regular in 0.9 % NaCl 100 unit/100 mL (1 unit/mL) infusion        Question:  Enter initial dose (Units/hr):  Answer:  0.6    06/10 2200 IV Continuous 06/10/23 1211    06/10/23 1309  insulin aspart U-100 pen 0-5 Units         -- SubQ As needed (PRN) 06/10/23 1211

## 2023-06-17 NOTE — PROGRESS NOTES
Pharmacokinetic Assessment Follow Up: IV Vancomycin    Vancomycin serum concentration assessment(s):    Vanc random=16.8 mcg/mL, s/p load of 1.25g    Vancomycin Regimen Plan:    PAPA worsening, will continue pulse dosing with vancomycin. Will not give additional dose today 6/17.  Vanc random in the AM    Drug levels (last 3 results):  Recent Labs   Lab Result Units 06/16/23  0347 06/17/23  0232   Vancomycin, Random ug/mL 13.5 16.8         Pharmacy will continue to follow and monitor vancomycin.    Thank you for the consult,   Geetha Garcia, PharmD, [facility]  [phone]     Patient brief summary:  Mariann Huff is a 63 y.o. female initiated on antimicrobial therapy with IV Vancomycin for treatment of intra-abdominal infection    The patient's current regimen is pulse dosing    Actual Body Weight:   62kg    Renal Function:   Estimated Creatinine Clearance: 16.2 mL/min (A) (based on SCr of 3.1 mg/dL (H)).,       CBC (last 72 hours):  Recent Labs   Lab Result Units 06/15/23  0354 06/16/23 0347 06/17/23  0232   WBC K/uL 11.89 8.44 8.26   Hemoglobin g/dL 8.9* 8.3* 9.4*   Hematocrit % 29.6* 27.3* 29.6*   Platelets K/uL 224 247 278   Gran % % 87.0* 84.1* 77.1*   Lymph % % 5.9* 7.8* 9.8*   Mono % % 5.6 6.4 9.1   Eosinophil % % 0.1 0.4 1.8   Basophil % % 0.2 0.2 0.5   Differential Method  Automated Automated Automated         Metabolic Panel (last 72 hours):  Recent Labs   Lab Result Units 06/14/23 2238 06/15/23  0354 06/16/23  0347 06/16/23  1724 06/17/23  0232   Sodium mmol/L 137 136 142  --  143   Sodium, Urine mmol/L  --   --   --  25  --    Potassium mmol/L 3.9 3.7 3.3*  --  3.7   Chloride mmol/L 99 100 108  --  109   CO2 mmol/L 23 22* 23  --  21*   Glucose mg/dL 247* 287* 144*  --  108   BUN mg/dL 28* 29* 28*  --  34*   Creatinine mg/dL 2.0* 2.5* 2.7*  --  3.1*   Creatinine, Urine mg/dL  --   --   --  88.0  --    Albumin g/dL  --  1.7* 1.5*  --  1.6*   Total Bilirubin mg/dL  --  6.5* 7.8*  --  9.2*   Alkaline Phosphatase  U/L  --  1,014* 1,056*  --  1,237*   AST U/L  --  304* 224*  --  195*   ALT U/L  --  221* 160*  --  146*   Magnesium mg/dL 2.3 2.3 2.3  --  2.6   Phosphorus mg/dL  --  2.7 2.3*  --  2.2*         Vancomycin Administrations:  vancomycin given in the last 96 hours                     vancomycin 1,250 mg in dextrose 5 % (D5W) 250 mL IVPB (Vial-Mate) (mg) 1,250 mg New Bag 06/15/23 0155                    Microbiologic Results:  Microbiology Results (last 7 days)       Procedure Component Value Units Date/Time    Blood culture [[id number]] Collected: 06/13/23 0814    Order Status: Completed Specimen: Blood Updated: 06/16/23 1012     Blood Culture, Routine No Growth to date      No Growth to date      No Growth to date      No Growth to date    Narrative:      Lft hand    Blood culture [[id number]] Collected: 06/13/23 0815    Order Status: Completed Specimen: Blood Updated: 06/16/23 1012     Blood Culture, Routine No Growth to date      No Growth to date      No Growth to date      No Growth to date    Narrative:      Lft forearm    Blood culture [[id number]] Collected: 06/13/23 0815    Order Status: Sent Specimen: Blood Updated: 06/13/23 0816    Blood culture [[id number]] Collected: 06/13/23 0816    Order Status: Sent Specimen: Blood Updated: 06/13/23 0816

## 2023-06-18 PROBLEM — E11.22 TYPE 2 DIABETES MELLITUS WITH CHRONIC KIDNEY DISEASE: Status: ACTIVE | Noted: 2023-06-09

## 2023-06-18 LAB
ALBUMIN SERPL BCP-MCNC: 1.5 G/DL (ref 3.5–5.2)
ALP SERPL-CCNC: 1008 U/L (ref 55–135)
ALT SERPL W/O P-5'-P-CCNC: 117 U/L (ref 10–44)
ANION GAP SERPL CALC-SCNC: 11 MMOL/L (ref 8–16)
AST SERPL-CCNC: 123 U/L (ref 10–40)
BACTERIA BLD CULT: NORMAL
BACTERIA BLD CULT: NORMAL
BASOPHILS # BLD AUTO: 0.04 K/UL (ref 0–0.2)
BASOPHILS NFR BLD: 0.4 % (ref 0–1.9)
BILIRUB SERPL-MCNC: 2.9 MG/DL (ref 0.1–1)
BUN SERPL-MCNC: 43 MG/DL (ref 8–23)
CALCIUM SERPL-MCNC: 9.3 MG/DL (ref 8.7–10.5)
CHLORIDE SERPL-SCNC: 108 MMOL/L (ref 95–110)
CO2 SERPL-SCNC: 19 MMOL/L (ref 23–29)
CREAT SERPL-MCNC: 3.8 MG/DL (ref 0.5–1.4)
DIFFERENTIAL METHOD: ABNORMAL
EOSINOPHIL # BLD AUTO: 0.4 K/UL (ref 0–0.5)
EOSINOPHIL NFR BLD: 3.5 % (ref 0–8)
ERYTHROCYTE [DISTWIDTH] IN BLOOD BY AUTOMATED COUNT: 15 % (ref 11.5–14.5)
EST. GFR  (NO RACE VARIABLE): 12.8 ML/MIN/1.73 M^2
GLUCOSE SERPL-MCNC: 168 MG/DL (ref 70–110)
HCT VFR BLD AUTO: 29.3 % (ref 37–48.5)
HGB BLD-MCNC: 9.2 G/DL (ref 12–16)
IMM GRANULOCYTES # BLD AUTO: 0.22 K/UL (ref 0–0.04)
IMM GRANULOCYTES NFR BLD AUTO: 2.2 % (ref 0–0.5)
LYMPHOCYTES # BLD AUTO: 1.1 K/UL (ref 1–4.8)
LYMPHOCYTES NFR BLD: 10.7 % (ref 18–48)
MAGNESIUM SERPL-MCNC: 2.6 MG/DL (ref 1.6–2.6)
MCH RBC QN AUTO: 26.4 PG (ref 27–31)
MCHC RBC AUTO-ENTMCNC: 31.4 G/DL (ref 32–36)
MCV RBC AUTO: 84 FL (ref 82–98)
MONOCYTES # BLD AUTO: 1 K/UL (ref 0.3–1)
MONOCYTES NFR BLD: 10.3 % (ref 4–15)
NEUTROPHILS # BLD AUTO: 7.2 K/UL (ref 1.8–7.7)
NEUTROPHILS NFR BLD: 72.9 % (ref 38–73)
NRBC BLD-RTO: 0 /100 WBC
PHOSPHATE SERPL-MCNC: 2.5 MG/DL (ref 2.7–4.5)
PLATELET # BLD AUTO: 264 K/UL (ref 150–450)
PMV BLD AUTO: 11.4 FL (ref 9.2–12.9)
POCT GLUCOSE: 145 MG/DL (ref 70–110)
POCT GLUCOSE: 210 MG/DL (ref 70–110)
POCT GLUCOSE: 215 MG/DL (ref 70–110)
POCT GLUCOSE: 222 MG/DL (ref 70–110)
POTASSIUM SERPL-SCNC: 3.4 MMOL/L (ref 3.5–5.1)
PROT SERPL-MCNC: 6 G/DL (ref 6–8.4)
RBC # BLD AUTO: 3.48 M/UL (ref 4–5.4)
SODIUM SERPL-SCNC: 138 MMOL/L (ref 136–145)
WBC # BLD AUTO: 9.87 K/UL (ref 3.9–12.7)

## 2023-06-18 PROCEDURE — 99233 PR SUBSEQUENT HOSPITAL CARE,LEVL III: ICD-10-PCS | Mod: ,,, | Performed by: HOSPITALIST

## 2023-06-18 PROCEDURE — 84100 ASSAY OF PHOSPHORUS: CPT | Performed by: FAMILY MEDICINE

## 2023-06-18 PROCEDURE — 99233 SBSQ HOSP IP/OBS HIGH 50: CPT | Mod: ,,, | Performed by: HOSPITALIST

## 2023-06-18 PROCEDURE — 25000003 PHARM REV CODE 250: Performed by: HOSPITALIST

## 2023-06-18 PROCEDURE — 36415 COLL VENOUS BLD VENIPUNCTURE: CPT | Performed by: FAMILY MEDICINE

## 2023-06-18 PROCEDURE — 99232 SBSQ HOSP IP/OBS MODERATE 35: CPT | Mod: ,,, | Performed by: INTERNAL MEDICINE

## 2023-06-18 PROCEDURE — 85025 COMPLETE CBC W/AUTO DIFF WBC: CPT | Performed by: FAMILY MEDICINE

## 2023-06-18 PROCEDURE — 80053 COMPREHEN METABOLIC PANEL: CPT | Performed by: STUDENT IN AN ORGANIZED HEALTH CARE EDUCATION/TRAINING PROGRAM

## 2023-06-18 PROCEDURE — 20600001 HC STEP DOWN PRIVATE ROOM

## 2023-06-18 PROCEDURE — 83735 ASSAY OF MAGNESIUM: CPT | Performed by: FAMILY MEDICINE

## 2023-06-18 PROCEDURE — 63600175 PHARM REV CODE 636 W HCPCS: Performed by: FAMILY MEDICINE

## 2023-06-18 PROCEDURE — 99232 PR SUBSEQUENT HOSPITAL CARE,LEVL II: ICD-10-PCS | Mod: ,,, | Performed by: INTERNAL MEDICINE

## 2023-06-18 PROCEDURE — 25000003 PHARM REV CODE 250: Performed by: STUDENT IN AN ORGANIZED HEALTH CARE EDUCATION/TRAINING PROGRAM

## 2023-06-18 RX ADMIN — FAMOTIDINE 20 MG: 20 TABLET, FILM COATED ORAL at 08:06

## 2023-06-18 RX ADMIN — INSULIN ASPART 2 UNITS: 100 INJECTION, SOLUTION INTRAVENOUS; SUBCUTANEOUS at 09:06

## 2023-06-18 RX ADMIN — METRONIDAZOLE 500 MG: 500 TABLET ORAL at 06:06

## 2023-06-18 RX ADMIN — METRONIDAZOLE 500 MG: 500 TABLET ORAL at 09:06

## 2023-06-18 RX ADMIN — METRONIDAZOLE 500 MG: 500 TABLET ORAL at 01:06

## 2023-06-18 RX ADMIN — ONDANSETRON 4 MG: 2 INJECTION INTRAMUSCULAR; INTRAVENOUS at 05:06

## 2023-06-18 RX ADMIN — INSULIN ASPART 2 UNITS: 100 INJECTION, SOLUTION INTRAVENOUS; SUBCUTANEOUS at 12:06

## 2023-06-18 RX ADMIN — CIPROFLOXACIN 500 MG: 500 TABLET, FILM COATED ORAL at 08:06

## 2023-06-18 RX ADMIN — INSULIN ASPART 2 UNITS: 100 INJECTION, SOLUTION INTRAVENOUS; SUBCUTANEOUS at 05:06

## 2023-06-18 RX ADMIN — PROCHLORPERAZINE EDISYLATE 5 MG: 5 INJECTION INTRAMUSCULAR; INTRAVENOUS at 08:06

## 2023-06-18 NOTE — SUBJECTIVE & OBJECTIVE
Interval History:   6/18: no acute issues overnight    Review of Systems   Constitutional:  Negative for chills and fever.   HENT:  Negative for congestion and sore throat.    Eyes:  Negative for photophobia and visual disturbance.   Respiratory:  Negative for cough and shortness of breath.    Cardiovascular:  Negative for chest pain and palpitations.   Gastrointestinal:  Negative for abdominal pain, constipation, diarrhea, nausea and vomiting.   Endocrine: Negative for cold intolerance and heat intolerance.   Genitourinary:  Negative for dysuria and hematuria.   Musculoskeletal:  Negative for arthralgias and myalgias.   Skin:  Negative for rash.   Allergic/Immunologic: Negative for environmental allergies and food allergies.   Neurological:  Negative for dizziness, seizures, syncope and headaches.   Hematological:  Negative for adenopathy. Does not bruise/bleed easily.   Psychiatric/Behavioral:  Negative for hallucinations and suicidal ideas.    Objective:     Vital Signs (Most Recent):  Temp: 98.5 °F (36.9 °C) (06/18/23 1220)  Pulse: 68 (06/18/23 1220)  Resp: 18 (06/18/23 1220)  BP: 136/84 (06/18/23 1220)  SpO2: 95 % (06/18/23 1500) Vital Signs (24h Range):  Temp:  [97.8 °F (36.6 °C)-98.8 °F (37.1 °C)] 98.5 °F (36.9 °C)  Pulse:  [66-72] 68  Resp:  [16-18] 18  SpO2:  [92 %-98 %] 95 %  BP: (136-177)/(71-84) 136/84     Weight: 62.8 kg (138 lb 7.2 oz)  Body mass index is 23.76 kg/m².    Intake/Output Summary (Last 24 hours) at 6/18/2023 1631  Last data filed at 6/18/2023 1227  Gross per 24 hour   Intake 480 ml   Output --   Net 480 ml         Physical Exam  Constitutional:       Appearance: She is well-developed.   HENT:      Head: Normocephalic and atraumatic.   Eyes:      Conjunctiva/sclera: Conjunctivae normal.      Pupils: Pupils are equal, round, and reactive to light.   Neck:      Thyroid: No thyromegaly.      Vascular: No JVD.   Cardiovascular:      Rate and Rhythm: Normal rate and regular rhythm.      Heart  sounds: Normal heart sounds. No murmur heard.    No friction rub. No gallop.   Pulmonary:      Effort: Pulmonary effort is normal.      Breath sounds: Normal breath sounds. No wheezing or rales.   Abdominal:      General: Bowel sounds are normal. There is no distension.      Palpations: Abdomen is soft.      Tenderness: There is no abdominal tenderness. There is no guarding or rebound.   Musculoskeletal:         General: No tenderness. Normal range of motion.      Cervical back: Neck supple.   Skin:     General: Skin is warm and dry.   Neurological:      Mental Status: She is oriented to person, place, and time. She is lethargic and confused.      Cranial Nerves: No cranial nerve deficit.      Comments: + asterixis   Psychiatric:         Behavior: Behavior normal.           Significant Labs: All pertinent labs within the past 24 hours have been reviewed.    Significant Imaging: I have reviewed all pertinent imaging results/findings within the past 24 hours.

## 2023-06-18 NOTE — PROGRESS NOTES
[facility] Medicine  Progress Note    Patient Name: Mariann Huff  MRN: [id number]  Patient Class: IP- Inpatient   Admission Date: 6/9/2023  Length of Stay: 9 days  Attending Physician: Zoë Treadwell MD  Primary Care Provider: Jasbir Haney MD        Subjective:     Principal Problem:<principal problem not specified>        HPI:  63F with PMH of CAD with GINGER , HTN, HLD, DM2, MURTAZA (not on CPAP), PAD, Afib and hx of DVT on Eliquis and recent admission for pancreatitis wo presents to the ED with nausea and vomiting. Patient recently admitted 05/25 to 05/26 for first episode of acute pancreatitis. Lipase >2000 at that time. CT abdomen and US without evidence of biliary obstruction or pancreatic inflammation. Lipid panel wnl. Patient pain and n/v improved with treatment for acute pancreatitis felt secondary to home med Trulicity. She was discharged with new insulin regimen which she reports she never picked up from pharmacy so has been taking farxiga only at home. States that she has had progressively worsening n/v and po intolerance. States that she is not having severe pain similar to recent pancreatitis. Denies fevers, chills, chest pain, shortness of breath.     In the ED patient afebrile and hemodynamically stable saturating well on room air. Lipase >2000. BG >700 and serum osmo 326 after 1L IVFs. B-hydroxybutyrate 1.3. pH 7.4 and bicarb 34. AG 12. Patient started on aggressive IVFs and insulin gtt for management on pancreatitis and HHS. Admitted to the care of medicine for further evaluation and management.       Overview/Hospital Course:  Patient transitioned from insulin drip to subcutaneous insulin on 06/10.  On 06/11, patient developed new hyperbilirubinemia and worsening liver enzymes and given history of pancreatitis concern for possible choledocholithiasis.  MRCP ordered did not demonstrate any focal obstruction showever liver enzymes and alk phos increasing in size. In speaking  with [facility], patient to be off apixaban and pletal prior to ERCP.      Interval History:   6/18: no acute issues overnight    Review of Systems   Constitutional:  Negative for chills and fever.   HENT:  Negative for congestion and sore throat.    Eyes:  Negative for photophobia and visual disturbance.   Respiratory:  Negative for cough and shortness of breath.    Cardiovascular:  Negative for chest pain and palpitations.   Gastrointestinal:  Negative for abdominal pain, constipation, diarrhea, nausea and vomiting.   Endocrine: Negative for cold intolerance and heat intolerance.   Genitourinary:  Negative for dysuria and hematuria.   Musculoskeletal:  Negative for arthralgias and myalgias.   Skin:  Negative for rash.   Allergic/Immunologic: Negative for environmental allergies and food allergies.   Neurological:  Negative for dizziness, seizures, syncope and headaches.   Hematological:  Negative for adenopathy. Does not bruise/bleed easily.   Psychiatric/Behavioral:  Negative for hallucinations and suicidal ideas.    Objective:     Vital Signs (Most Recent):  Temp: 98.5 °F (36.9 °C) (06/18/23 1220)  Pulse: 68 (06/18/23 1220)  Resp: 18 (06/18/23 1220)  BP: 136/84 (06/18/23 1220)  SpO2: 95 % (06/18/23 1500) Vital Signs (24h Range):  Temp:  [97.8 °F (36.6 °C)-98.8 °F (37.1 °C)] 98.5 °F (36.9 °C)  Pulse:  [66-72] 68  Resp:  [16-18] 18  SpO2:  [92 %-98 %] 95 %  BP: (136-177)/(71-84) 136/84     Weight: 62.8 kg (138 lb 7.2 oz)  Body mass index is 23.76 kg/m².    Intake/Output Summary (Last 24 hours) at 6/18/2023 1631  Last data filed at 6/18/2023 1227  Gross per 24 hour   Intake 480 ml   Output --   Net 480 ml         Physical Exam  Constitutional:       Appearance: She is well-developed.   HENT:      Head: Normocephalic and atraumatic.   Eyes:      Conjunctiva/sclera: Conjunctivae normal.      Pupils: Pupils are equal, round, and reactive to light.   Neck:      Thyroid: No thyromegaly.      Vascular: No JVD.   Cardiovascular:       Rate and Rhythm: Normal rate and regular rhythm.      Heart sounds: Normal heart sounds. No murmur heard.    No friction rub. No gallop.   Pulmonary:      Effort: Pulmonary effort is normal.      Breath sounds: Normal breath sounds. No wheezing or rales.   Abdominal:      General: Bowel sounds are normal. There is no distension.      Palpations: Abdomen is soft.      Tenderness: There is no abdominal tenderness. There is no guarding or rebound.   Musculoskeletal:         General: No tenderness. Normal range of motion.      Cervical back: Neck supple.   Skin:     General: Skin is warm and dry.   Neurological:      Mental Status: She is oriented to person, place, and time. She is lethargic and confused.      Cranial Nerves: No cranial nerve deficit.      Comments: + asterixis   Psychiatric:         Behavior: Behavior normal.           Significant Labs: All pertinent labs within the past 24 hours have been reviewed.    Significant Imaging: I have reviewed all pertinent imaging results/findings within the past 24 hours.      Assessment/Plan:      Hyperbilirubinemia  Given worsening liver enzymes, alk-phos, bilirubin concern for ongoing obstructive process.  - [facility] consulted, in speaking with them plan for EUS of pancreas on 06/15 as patient did not have her Pletal held as well.  Apixaban held in preparation      History of DVT (deep vein thrombosis)  - hold home Eliquis      Type 2 diabetes mellitus with chronic kidney disease  IP HHS/DKA Pathway initiated. On presentation BG >700 and serum osmo 326 after 1L IVFs. B-hydroxybutyrate 1.3. pH 7.4 and bicarb 34. AG 12. Patient was started on insulin drip on admission but later in the day, patient transitioned to Levemir 14 units at night and aspart 3 units with meals.  Started on diabetic diet  - patient currently on Levemir 20 units nightly, 10 units aspart with meals  - further insulin titration per endocrinology  - POC AC/HS      Acute recurrent pancreatitis  Lipase  ">2000 on presentation, downtrending without any identifiable cause for elevation. Patient without any abdominal pain. Discontinue HCTZ as it could potentially be cause of pancreatitis as well  - US abdomen with gallbladder sludge and "prominence" of the pancreatic duct   - liver enzymes, alk-phos, bilirubin elevated on 06/11, suggestive of possible choledocholithiasis as cause of pancreatitis.  MRCP ordered  - MRCP on my personal interpretation without any focal obstruction or retained stones, however liver enzymes continue to elevate  - [facility] was consulted for findings and after discussion, recommended ERCP/EUS once off apixaban and pletal        PAD (peripheral artery disease)  - hold pletal, ASA, and statin  - started on apixaban for PAD outpatient as well, holding in setting of upcoming procedure on 6/14      Asthma  - albuterol prn      Acute encephalopathy  Perhaps related to sepsis.  Patient on 6/14 a.m., increasingly lethargic.  Per , last night she had jerking movements when attempting to go to the restroom with assistance.  On exam this morning she had asterixis and required sternal rubbing to awaken her.  - Ammonia was ordered and was normal for the past 2 days, patient has not had a bowel movement in over a week per the patient's .  We will give lactulose enema regardless of ammonia level  - VBG ordered, personal interpretation reflects no evidence of hypercapnic respiratory failure  - blood cultures were collected yesterday x2, no growth today  - escalated patient's antibiotics from ceftriaxone and metronidazole to Zosyn given her increasing white blood cell count      Sepsis with acute liver failure without hepatic coma or septic shock  This patient does have evidence of infective focus  My overall impression is sepsis.  Source: Abdominal and potentially bacteremia  Antibiotics given-   Antibiotics (72h ago, onward)    Start     Stop Route Frequency Ordered    06/14/23 1100  " piperacillin-tazobactam (ZOSYN) 4.5 g in dextrose 5 % in water (D5W) 5 % 100 mL IVPB (MB+)         -- IV Every 8 hours (non-standard times) 06/14/23 0959        Latest lactate reviewed-  Recent Labs   Lab 06/13/23  0813   LACTATE 1.3     Organ dysfunction indicated by Acute liver injury    Fluid challenge Not needed - patient is not hypotensive      Post- resuscitation assessment No - Post resuscitation assessment not needed       Will Not start Pressors- Levophed for MAP of 65  Source control achieved by: Zosyn and potential [facility] procedure on 6/15    PAPA (acute kidney injury)  Creatinine 1.7 on presentation ; baseline 1.0  - IVFs stopped, recurrent PAPA on 6/12 with FeNA indication pre-renal disease. 1L LR bolus ordered over 3 hrs  - worsening renal function in setting of hypotension on 6/15      Hyperlipidemia associated with type 2 diabetes mellitus  Given rise in liver enzymes, discontinuing atorvastatin    Hypertension associated with diabetes  Discontinued all antihypertensive medications given worsening blood pressure and concerns for sepsis      VTE Risk Mitigation (From admission, onward)    None          Discharge Planning   REBECCA: 6/19/2023     Code Status: Full Code   Is the patient medically ready for discharge?: No    Reason for patient still in hospital (select all that apply): Patient trending condition  Discharge Plan A: Home with family   Discharge Delays: None known at this time              Zoë Treadwell MD  Department of Hospital Medicine   [facility] - Telemetry Stepdown

## 2023-06-18 NOTE — PROGRESS NOTES
[facility] - Tele[facility] Stepdown  Endocrinology  Progress Note    Admit Date: 2023     63 year old  female with type 2 diabetes mellitus, CKDIV, CAD, hypertension, hyperlipidemia, MURTAZA (not on CPAP), PAD, Afib and hx of DVT on Eliquis and recent admission for pancreatitis who presents with HHS and developing possible biliary obstruction.     - Patient recently admitted  to  for acute pancreatitis attributed to Trulicity, which was discontinued upon d/c. Per pt's PCP this was 2nd episode of pancreatitis while on trulicity -- prior episode when increasing dose of Trulicity.   Pt states upon d/c  she was feeling slight improvement but still having intermittent n/v and not tolerating normal diet. N/V began to get worse in last few days which prompted admission. No abdominal pain on presentation for current admission, unlike last admit when she had severe abd pain for pancreatitis.    In the ED patient afebrile and hemodynamically stable saturating well on room air. Lipase >2000. BG >700 and serum osmo 326 after 1L IVFs. B-hydroxybutyrate 1.3. pH 7.4 and bicarb 34. AG 12. Patient started on aggressive IVFs and insulin gtt for HHS.     - Endocrinology consulted for initial HHS and glucose management      Regarding Type 2 Diabetes Mellitus:    - Initially diagnosed with Type 2 diabetes mellitus: Over 20 years prior  - Home diabetic medications include: Farxiga 10mg qd, Glipizide ER 10mg daily, Levemir 20u qhs (but never started). She has been on MDI previously, most recently she was on Toujeo and Humalog discontinued 2021 but she states she was on insulin up until about 10mo ago.  - Family History: Not asked  - Weight based dosin kg x 0.4 (CKD) = 25 TDD x 0.5 = 12 basal / 12 prandial  - 1700/TDD = 68 (estimated insulin sensitivity factor)  - 450/TDD = 18 (estimated starting carb ratio for prandial dosing)    Lab Results   Component Value Date    HGBA1C 8.1 (H) 2023    HGBA1C 8.2  "(H) 04/05/2023    HGBA1C 8.8 (H) 03/29/2023    CPEPTIDE 1.36 06/12/2017     Lab Results   Component Value Date    EGFRNORACEVR 12.8 (A) 06/18/2023    EGFRNORACEVR 16.4 (A) 06/17/2023    EGFRNORACEVR 19.3 (A) 06/16/2023       Interval HPI:   Overnight events:  No acute events reported overnight  Eating:   <25%  Nausea: No  Hypoglycemia and intervention: No  Fever: No  TPN and/or TF: No  If yes, type of TF/TPN and rate: NA    BP (!) 164/77   Pulse 66   Temp 98 °F (36.7 °C)   Resp 18   Ht 5' 4" (1.626 m)   Wt 62.8 kg (138 lb 7.2 oz)   LMP  (LMP Unknown)   SpO2 98%   Breastfeeding No   BMI 23.76 kg/m²     Labs Reviewed and Include    Recent Labs   Lab 06/18/23  0247   *   CALCIUM 9.3   ALBUMIN 1.5*   PROT 6.0      K 3.4*   CO2 19*      BUN 43*   CREATININE 3.8*   ALKPHOS 1,008*   *   *   BILITOT 2.9*     Lab Results   Component Value Date    WBC 9.87 06/18/2023    HGB 9.2 (L) 06/18/2023    HCT 29.3 (L) 06/18/2023    MCV 84 06/18/2023     06/18/2023     No results for input(s): TSH, FREET4 in the last 168 hours.  Lab Results   Component Value Date    HGBA1C 8.1 (H) 05/25/2023       Nutritional status:   Body mass index is 23.76 kg/m².  Lab Results   Component Value Date    ALBUMIN 1.5 (L) 06/18/2023    ALBUMIN 1.6 (L) 06/17/2023    ALBUMIN 1.5 (L) 06/16/2023     No results found for: PREALBUMIN    Estimated Creatinine Clearance: 13.3 mL/min (A) (based on SCr of 3.8 mg/dL (H)).    Accu-Checks  Recent Labs     06/15/23  1956 06/16/23  0758 06/16/23  1236 06/16/23  1604 06/16/23  2202 06/17/23  0741 06/17/23  1122 06/17/23  1503 06/17/23  1657 06/17/23  2130   POCTGLUCOSE 161* 129* 150* 170* 163* 75 68* 245* 225* 189*       Current Medications and/or Treatments Impacting Glycemic Control  Immunotherapy:    Immunosuppressants       None          Steroids:   Hormones (From admission, onward)      Start     Stop Route Frequency Ordered    06/09/23 2028  melatonin tablet 6 mg     "     -- Oral Nightly PRN 06/09/23 1931          Pressors:    Autonomic Drugs (From admission, onward)      Start     Stop Route Frequency Ordered    06/15/23 1200  midodrine tablet 10 mg         -- Oral 3 times daily with meals 06/15/23 0940          Hyperglycemia/Diabetes Medications:   Antihyperglycemics (From admission, onward)      Start     Stop Route Frequency Ordered    06/17/23 2100  insulin detemir U-100 (Levemir) pen 16 Units         -- SubQ Nightly 06/17/23 0807    06/10/23 1315  insulin regular in 0.9 % NaCl 100 unit/100 mL (1 unit/mL) infusion        Question:  Enter initial dose (Units/hr):  Answer:  0.6    06/10 2200 IV Continuous 06/10/23 1211    06/10/23 1309  insulin aspart U-100 pen 0-5 Units         -- SubQ As needed (PRN) 06/10/23 1211            ASSESSMENT and PLAN    Cardiac/Vascular  Hyperlipidemia associated with type 2 diabetes mellitus  - On Lipitor 40mg and Repatha outpatient  - Management per primary    Renal/  PAPA (acute kidney injury)  - Improved/resolved. Likely 2/2 IVVD 2/2 HHS on admission  - Management per primary    Endocrine  Type 2 diabetes mellitus with chronic kidney disease  - HHS on admission. Recent pancreatitis admission, not yet back to normal po intake with worsening hepatic function. Taking Glipizide and Farxiga at home. Never picked up Levemir Rx recently prescribed by her PCP  - Noted plan for laparoscopic cholecystectomy tentatively planned for 06/19/2023  - 24 hour glucose trend:low 200s to mid 100s, worsening renal function with decreased insulin clearance increasing risk of hypoglycemia    - Continue Levemir 16 units at night  - Once appetite improves, resume aspart at lower dose of 3 units TIDAC+LDC    - If blood glucose greater than 300, please ask patient not to eat food or drink anything other than water until correctional insulin has brought it back below 250    GI  History of pancreatitis  - She has now had two episodes of pancreatitis on Trulicity, however  suspicion for choledocholithiasis as cause for pancreatitis  - Laparoscopic cholecystectomy tentatively planned for 06/19/2023  - Hold Trulicity at discharge      Mario Alberto Carroll DO  [facility], [address]th Floor  [address] [place], [place], [address]    Office: [phone]  Fax: [phone]    Disclaimer: This note has been generated using voice-recognition software. There may be typographical errors that have been missed during proof-reading.    The above history labs imaging impression and plan were discussed with attending physician who is in agreement and also took part in this patient's care.  I personally reviewed all of the patients available medications, labs, imaging, vitals, allergies, medical history.

## 2023-06-18 NOTE — PLAN OF CARE
Problem: Adult Inpatient Plan of Care  Goal: Plan of Care Review  Outcome: Ongoing, Progressing  Goal: Patient-Specific Goal (Individualized)  Outcome: Ongoing, Progressing  Goal: Absence of Hospital-Acquired Illness or Injury  Outcome: Ongoing, Progressing  Goal: Optimal Comfort and Wellbeing  Outcome: Ongoing, Progressing  Goal: Readiness for Transition of Care  Outcome: Ongoing, Progressing     Problem: Diabetic Ketoacidosis  Goal: Fluid and Electrolyte Balance with Absence of Ketosis  Outcome: Ongoing, Progressing     Problem: Diabetes Comorbidity  Goal: Blood Glucose Level Within Targeted Range  Outcome: Ongoing, Progressing     Problem: Fluid and Electrolyte Imbalance (Acute Kidney Injury/Impairment)  Goal: Fluid and Electrolyte Balance  Outcome: Ongoing, Progressing     Problem: Oral Intake Inadequate (Acute Kidney Injury/Impairment)  Goal: Optimal Nutrition Intake  Outcome: Ongoing, Progressing     Problem: Renal Function Impairment (Acute Kidney Injury/Impairment)  Goal: Effective Renal Function  Outcome: Ongoing, Progressing     Problem: Adjustment to Illness (Sepsis/Septic Shock)  Goal: Optimal Coping  Outcome: Ongoing, Progressing     Problem: Bleeding (Sepsis/Septic Shock)  Goal: Absence of Bleeding  Outcome: Ongoing, Progressing     Problem: Glycemic Control Impaired (Sepsis/Septic Shock)  Goal: Blood Glucose Level Within Desired Range  Outcome: Ongoing, Progressing     Problem: Infection Progression (Sepsis/Septic Shock)  Goal: Absence of Infection Signs and Symptoms  Outcome: Ongoing, Progressing     Problem: Nutrition Impaired (Sepsis/Septic Shock)  Goal: Optimal Nutrition Intake  Outcome: Ongoing, Progressing     Problem: Skin Injury Risk Increased  Goal: Skin Health and Integrity  Outcome: Ongoing, Progressing     Problem: Fall Injury Risk  Goal: Absence of Fall and Fall-Related Injury  Outcome: Ongoing, Progressing     Problem: Impaired Wound Healing  Goal: Optimal Wound Healing  Outcome:  Ongoing, Progressing  Pt. Poc reviewed she is currently laying in bed with her eyes open resp even and unlabored no distress noted denies pain at this time safety precautions maintained.

## 2023-06-18 NOTE — NURSING
Bedside handoff report completed pt sitting up on edge of the bed resp even and unlabored no distress noted at this time no pain voiced at this time safety precautions maintained.

## 2023-06-18 NOTE — CARE UPDATE
General Surgery    Pending cholecystectomy following recent ERCP for choledocholithiasis. No evidence of acute cholecystitis. Will plan for lap cholecystectomy pending improvement in PAPA.   Please call with questions

## 2023-06-18 NOTE — ASSESSMENT & PLAN NOTE
- HHS on admission. Recent pancreatitis admission, not yet back to normal po intake with worsening hepatic function. Taking Glipizide and Farxiga at home. Never picked up Levemir Rx recently prescribed by her PCP  - Noted plan for laparoscopic cholecystectomy tentatively planned for 06/19/2023  - 24 hour glucose trend:low 200s to mid 100s, worsening renal function with decreased insulin clearance increasing risk of hypoglycemia    - Continue Levemir 16 units at night  - Once appetite improves, resume aspart at lower dose of 3 units TIDAC+LDC    - If blood glucose greater than 300, please ask patient not to eat food or drink anything other than water until correctional insulin has brought it back below 250

## 2023-06-19 PROBLEM — R65.20 SEPSIS WITH ACUTE LIVER FAILURE WITHOUT HEPATIC COMA OR SEPTIC SHOCK: Status: RESOLVED | Noted: 2019-04-20 | Resolved: 2023-06-19

## 2023-06-19 PROBLEM — K72.00 SEPSIS WITH ACUTE LIVER FAILURE WITHOUT HEPATIC COMA OR SEPTIC SHOCK: Status: RESOLVED | Noted: 2019-04-20 | Resolved: 2023-06-19

## 2023-06-19 PROBLEM — A41.9 SEPSIS WITH ACUTE LIVER FAILURE WITHOUT HEPATIC COMA OR SEPTIC SHOCK: Status: RESOLVED | Noted: 2019-04-20 | Resolved: 2023-06-19

## 2023-06-19 LAB
ALBUMIN SERPL BCP-MCNC: 1.6 G/DL (ref 3.5–5.2)
ALP SERPL-CCNC: 869 U/L (ref 55–135)
ALT SERPL W/O P-5'-P-CCNC: 88 U/L (ref 10–44)
ANION GAP SERPL CALC-SCNC: 11 MMOL/L (ref 8–16)
ANISOCYTOSIS BLD QL SMEAR: SLIGHT
AST SERPL-CCNC: 80 U/L (ref 10–40)
BASOPHILS # BLD AUTO: 0.07 K/UL (ref 0–0.2)
BASOPHILS NFR BLD: 0.7 % (ref 0–1.9)
BILIRUB SERPL-MCNC: 1.9 MG/DL (ref 0.1–1)
BUN SERPL-MCNC: 44 MG/DL (ref 8–23)
CALCIUM SERPL-MCNC: 9.3 MG/DL (ref 8.7–10.5)
CHLORIDE SERPL-SCNC: 108 MMOL/L (ref 95–110)
CO2 SERPL-SCNC: 20 MMOL/L (ref 23–29)
CREAT SERPL-MCNC: 3.3 MG/DL (ref 0.5–1.4)
DIFFERENTIAL METHOD: ABNORMAL
EOSINOPHIL # BLD AUTO: 0.5 K/UL (ref 0–0.5)
EOSINOPHIL NFR BLD: 4.5 % (ref 0–8)
ERYTHROCYTE [DISTWIDTH] IN BLOOD BY AUTOMATED COUNT: 15 % (ref 11.5–14.5)
EST. GFR  (NO RACE VARIABLE): 15.2 ML/MIN/1.73 M^2
GLUCOSE SERPL-MCNC: 154 MG/DL (ref 70–110)
HCT VFR BLD AUTO: 30.7 % (ref 37–48.5)
HGB BLD-MCNC: 9.6 G/DL (ref 12–16)
IMM GRANULOCYTES # BLD AUTO: 0.39 K/UL (ref 0–0.04)
IMM GRANULOCYTES NFR BLD AUTO: 3.9 % (ref 0–0.5)
LYMPHOCYTES # BLD AUTO: 1.2 K/UL (ref 1–4.8)
LYMPHOCYTES NFR BLD: 12.5 % (ref 18–48)
MAGNESIUM SERPL-MCNC: 2.2 MG/DL (ref 1.6–2.6)
MCH RBC QN AUTO: 26.4 PG (ref 27–31)
MCHC RBC AUTO-ENTMCNC: 31.3 G/DL (ref 32–36)
MCV RBC AUTO: 84 FL (ref 82–98)
MONOCYTES # BLD AUTO: 1.3 K/UL (ref 0.3–1)
MONOCYTES NFR BLD: 12.7 % (ref 4–15)
NEUTROPHILS # BLD AUTO: 6.5 K/UL (ref 1.8–7.7)
NEUTROPHILS NFR BLD: 65.7 % (ref 38–73)
NRBC BLD-RTO: 0 /100 WBC
OVALOCYTES BLD QL SMEAR: ABNORMAL
PHOSPHATE SERPL-MCNC: 2.6 MG/DL (ref 2.7–4.5)
PLATELET # BLD AUTO: 280 K/UL (ref 150–450)
PLATELET BLD QL SMEAR: ABNORMAL
PMV BLD AUTO: 10.8 FL (ref 9.2–12.9)
POCT GLUCOSE: 146 MG/DL (ref 70–110)
POCT GLUCOSE: 193 MG/DL (ref 70–110)
POCT GLUCOSE: 243 MG/DL (ref 70–110)
POCT GLUCOSE: 252 MG/DL (ref 70–110)
POIKILOCYTOSIS BLD QL SMEAR: SLIGHT
POTASSIUM SERPL-SCNC: 3.2 MMOL/L (ref 3.5–5.1)
PROT SERPL-MCNC: 6.4 G/DL (ref 6–8.4)
RBC # BLD AUTO: 3.64 M/UL (ref 4–5.4)
SODIUM SERPL-SCNC: 139 MMOL/L (ref 136–145)
WBC # BLD AUTO: 9.95 K/UL (ref 3.9–12.7)

## 2023-06-19 PROCEDURE — 25000003 PHARM REV CODE 250: Performed by: STUDENT IN AN ORGANIZED HEALTH CARE EDUCATION/TRAINING PROGRAM

## 2023-06-19 PROCEDURE — 99232 SBSQ HOSP IP/OBS MODERATE 35: CPT | Mod: ,,, | Performed by: INTERNAL MEDICINE

## 2023-06-19 PROCEDURE — 94761 N-INVAS EAR/PLS OXIMETRY MLT: CPT

## 2023-06-19 PROCEDURE — 25000003 PHARM REV CODE 250: Performed by: HOSPITALIST

## 2023-06-19 PROCEDURE — 83735 ASSAY OF MAGNESIUM: CPT | Performed by: FAMILY MEDICINE

## 2023-06-19 PROCEDURE — 85025 COMPLETE CBC W/AUTO DIFF WBC: CPT | Performed by: FAMILY MEDICINE

## 2023-06-19 PROCEDURE — 80053 COMPREHEN METABOLIC PANEL: CPT | Performed by: FAMILY MEDICINE

## 2023-06-19 PROCEDURE — 63600175 PHARM REV CODE 636 W HCPCS: Performed by: STUDENT IN AN ORGANIZED HEALTH CARE EDUCATION/TRAINING PROGRAM

## 2023-06-19 PROCEDURE — 99232 PR SUBSEQUENT HOSPITAL CARE,LEVL II: ICD-10-PCS | Mod: ,,, | Performed by: INTERNAL MEDICINE

## 2023-06-19 PROCEDURE — 63600175 PHARM REV CODE 636 W HCPCS: Performed by: FAMILY MEDICINE

## 2023-06-19 PROCEDURE — 36415 COLL VENOUS BLD VENIPUNCTURE: CPT | Performed by: FAMILY MEDICINE

## 2023-06-19 PROCEDURE — 84100 ASSAY OF PHOSPHORUS: CPT | Performed by: FAMILY MEDICINE

## 2023-06-19 PROCEDURE — 99233 PR SUBSEQUENT HOSPITAL CARE,LEVL III: ICD-10-PCS | Mod: ,,, | Performed by: HOSPITALIST

## 2023-06-19 PROCEDURE — 20600001 HC STEP DOWN PRIVATE ROOM

## 2023-06-19 PROCEDURE — 99233 SBSQ HOSP IP/OBS HIGH 50: CPT | Mod: ,,, | Performed by: HOSPITALIST

## 2023-06-19 RX ORDER — NAPROXEN SODIUM 220 MG/1
81 TABLET, FILM COATED ORAL DAILY
Status: DISCONTINUED | OUTPATIENT
Start: 2023-06-20 | End: 2023-06-25 | Stop reason: HOSPADM

## 2023-06-19 RX ORDER — HYDRALAZINE HYDROCHLORIDE 25 MG/1
25 TABLET, FILM COATED ORAL EVERY 8 HOURS PRN
Status: DISCONTINUED | OUTPATIENT
Start: 2023-06-19 | End: 2023-06-25 | Stop reason: HOSPADM

## 2023-06-19 RX ORDER — AMLODIPINE BESYLATE 10 MG/1
10 TABLET ORAL DAILY
Status: DISCONTINUED | OUTPATIENT
Start: 2023-06-19 | End: 2023-06-25 | Stop reason: HOSPADM

## 2023-06-19 RX ORDER — INSULIN ASPART 100 [IU]/ML
3 INJECTION, SOLUTION INTRAVENOUS; SUBCUTANEOUS
Status: DISCONTINUED | OUTPATIENT
Start: 2023-06-19 | End: 2023-06-20

## 2023-06-19 RX ADMIN — APIXABAN 2.5 MG: 2.5 TABLET, FILM COATED ORAL at 09:06

## 2023-06-19 RX ADMIN — FAMOTIDINE 20 MG: 20 TABLET, FILM COATED ORAL at 08:06

## 2023-06-19 RX ADMIN — INSULIN ASPART 3 UNITS: 100 INJECTION, SOLUTION INTRAVENOUS; SUBCUTANEOUS at 04:06

## 2023-06-19 RX ADMIN — INSULIN ASPART 3 UNITS: 100 INJECTION, SOLUTION INTRAVENOUS; SUBCUTANEOUS at 12:06

## 2023-06-19 RX ADMIN — METRONIDAZOLE 500 MG: 500 TABLET ORAL at 09:06

## 2023-06-19 RX ADMIN — METRONIDAZOLE 500 MG: 500 TABLET ORAL at 01:06

## 2023-06-19 RX ADMIN — PROCHLORPERAZINE EDISYLATE 5 MG: 5 INJECTION INTRAMUSCULAR; INTRAVENOUS at 07:06

## 2023-06-19 RX ADMIN — AMLODIPINE BESYLATE 10 MG: 10 TABLET ORAL at 04:06

## 2023-06-19 RX ADMIN — INSULIN ASPART 2 UNITS: 100 INJECTION, SOLUTION INTRAVENOUS; SUBCUTANEOUS at 04:06

## 2023-06-19 RX ADMIN — CIPROFLOXACIN 500 MG: 500 TABLET, FILM COATED ORAL at 08:06

## 2023-06-19 RX ADMIN — METRONIDAZOLE 500 MG: 500 TABLET ORAL at 05:06

## 2023-06-19 RX ADMIN — MIDODRINE HYDROCHLORIDE 10 MG: 5 TABLET ORAL at 08:06

## 2023-06-19 RX ADMIN — ONDANSETRON 4 MG: 2 INJECTION INTRAMUSCULAR; INTRAVENOUS at 10:06

## 2023-06-19 NOTE — ASSESSMENT & PLAN NOTE
This patient does have evidence of infective focus  My overall impression is sepsis.  Source: Abdominal and potentially bacteremia  Antibiotics given-   Antibiotics (72h ago, onward)    Start     Stop Route Frequency Ordered    06/17/23 1400  metroNIDAZOLE tablet 500 mg         -- Oral Every 8 hours 06/17/23 0910    06/17/23 1015  ciprofloxacin HCl tablet 500 mg         -- Oral Daily 06/17/23 0910        Latest lactate reviewed-  No results for input(s): LACTATE in the last 72 hours.  Organ dysfunction indicated by Acute liver injury    Fluid challenge Not needed - patient is not hypotensive      Post- resuscitation assessment No - Post resuscitation assessment not needed       Will Not start Pressors- Levophed for MAP of 65  Source control achieved by: Zosyn and potential AES procedure on 6/15

## 2023-06-19 NOTE — SUBJECTIVE & OBJECTIVE
Interval History:   6/19: PAPA better. Patient without other complaints    Review of Systems   Constitutional:  Negative for chills and fever.   HENT:  Negative for congestion and sore throat.    Eyes:  Negative for photophobia and visual disturbance.   Respiratory:  Negative for cough and shortness of breath.    Cardiovascular:  Negative for chest pain and palpitations.   Gastrointestinal:  Negative for abdominal pain, constipation, diarrhea, nausea and vomiting.   Endocrine: Negative for cold intolerance and heat intolerance.   Genitourinary:  Negative for dysuria and hematuria.   Musculoskeletal:  Negative for arthralgias and myalgias.   Skin:  Negative for rash.   Allergic/Immunologic: Negative for environmental allergies and food allergies.   Neurological:  Negative for dizziness, seizures, syncope and headaches.   Hematological:  Negative for adenopathy. Does not bruise/bleed easily.   Psychiatric/Behavioral:  Negative for hallucinations and suicidal ideas.    Objective:     Vital Signs (Most Recent):  Temp: 96.1 °F (35.6 °C) (06/19/23 1537)  Pulse: 70 (06/19/23 1537)  Resp: 18 (06/19/23 1537)  BP: (!) 176/86 (06/19/23 1537)  SpO2: 96 % (06/19/23 1537) Vital Signs (24h Range):  Temp:  [96.1 °F (35.6 °C)-98.3 °F (36.8 °C)] 96.1 °F (35.6 °C)  Pulse:  [68-80] 70  Resp:  [18] 18  SpO2:  [92 %-100 %] 96 %  BP: (113-182)/(58-86) 176/86     Weight: 62.8 kg (138 lb 7.2 oz)  Body mass index is 23.76 kg/m².    Intake/Output Summary (Last 24 hours) at 6/19/2023 1603  Last data filed at 6/19/2023 0620  Gross per 24 hour   Intake --   Output 1200 ml   Net -1200 ml         Physical Exam  Constitutional:       Appearance: She is well-developed.   HENT:      Head: Normocephalic and atraumatic.   Eyes:      Conjunctiva/sclera: Conjunctivae normal.      Pupils: Pupils are equal, round, and reactive to light.   Neck:      Thyroid: No thyromegaly.      Vascular: No JVD.   Cardiovascular:      Rate and Rhythm: Normal rate and  regular rhythm.      Heart sounds: Normal heart sounds. No murmur heard.    No friction rub. No gallop.   Pulmonary:      Effort: Pulmonary effort is normal.      Breath sounds: Normal breath sounds. No wheezing or rales.   Abdominal:      General: Bowel sounds are normal. There is no distension.      Palpations: Abdomen is soft.      Tenderness: There is no abdominal tenderness. There is no guarding or rebound.   Musculoskeletal:         General: No tenderness. Normal range of motion.      Cervical back: Neck supple.   Skin:     General: Skin is warm and dry.   Neurological:      Mental Status: She is oriented to person, place, and time. She is lethargic and confused.      Cranial Nerves: No cranial nerve deficit.      Comments: + asterixis   Psychiatric:         Behavior: Behavior normal.           Significant Labs: All pertinent labs within the past 24 hours have been reviewed.    Significant Imaging: I have reviewed all pertinent imaging results/findings within the past 24 hours.

## 2023-06-19 NOTE — PLAN OF CARE
Problem: Adult Inpatient Plan of Care  Goal: Plan of Care Review  Outcome: Ongoing, Progressing  Goal: Patient-Specific Goal (Individualized)  Outcome: Ongoing, Progressing  Goal: Absence of Hospital-Acquired Illness or Injury  Outcome: Ongoing, Progressing  Goal: Optimal Comfort and Wellbeing  Outcome: Ongoing, Progressing  Goal: Readiness for Transition of Care  Outcome: Ongoing, Progressing     Problem: Diabetic Ketoacidosis  Goal: Fluid and Electrolyte Balance with Absence of Ketosis  Outcome: Ongoing, Progressing     Problem: Diabetes Comorbidity  Goal: Blood Glucose Level Within Targeted Range  Outcome: Ongoing, Progressing     Problem: Fluid and Electrolyte Imbalance (Acute Kidney Injury/Impairment)  Goal: Fluid and Electrolyte Balance  Outcome: Ongoing, Progressing     Problem: Oral Intake Inadequate (Acute Kidney Injury/Impairment)  Goal: Optimal Nutrition Intake  Outcome: Ongoing, Progressing     Problem: Renal Function Impairment (Acute Kidney Injury/Impairment)  Goal: Effective Renal Function  Outcome: Ongoing, Progressing     Problem: Adjustment to Illness (Sepsis/Septic Shock)  Goal: Optimal Coping  Outcome: Ongoing, Progressing     Problem: Bleeding (Sepsis/Septic Shock)  Goal: Absence of Bleeding  Outcome: Ongoing, Progressing     Problem: Glycemic Control Impaired (Sepsis/Septic Shock)  Goal: Blood Glucose Level Within Desired Range  Outcome: Ongoing, Progressing     Problem: Infection Progression (Sepsis/Septic Shock)  Goal: Absence of Infection Signs and Symptoms  Outcome: Ongoing, Progressing     Problem: Nutrition Impaired (Sepsis/Septic Shock)  Goal: Optimal Nutrition Intake  Outcome: Ongoing, Progressing     Problem: Skin Injury Risk Increased  Goal: Skin Health and Integrity  Outcome: Ongoing, Progressing     Problem: Fall Injury Risk  Goal: Absence of Fall and Fall-Related Injury  Outcome: Ongoing, Progressing     Problem: Impaired Wound Healing  Goal: Optimal Wound Healing  Outcome:  Ongoing, Progressing        PT IN BED BREATHING WITH EASE. PT UNDERSTOOD SAFETY CONCERNS REGARDING USING BSC. VITALS STABLE. MONITORING

## 2023-06-19 NOTE — ASSESSMENT & PLAN NOTE
Patient with baseline creatine 1.0-1.3, on admission 1.7 and worsened to peak 3.8 on 6/19. Now improving    Suspect PAPA likely ischemic ATN in s/o hemodynamic instability 2/2 sepsis due to intra-abdominal source    6/13 UA with trace protein, 4+ glu, 2+ ketones, trace blood, 1+ bili, few bacteria  Urine microscopy preformed in nephro lab on 6/16/23 with many granular casts, consistent with ATN  RP US with mild to moderate left hydronephrosis and distended bladder on 6/16, bedside ultrasound 6/19 with similar finding    Recommendations:  -- Continue daily or qshift bladder scans and consider correa catheter if volume >300ml on bladder scan    -- Would recommend discontinuing scheduled midodrine as patient has been hypertensive, can consider keeping as PRN if needed   -- Strict intake and output  -- Maintain hemodynamic stability  -- Avoid nephrotoxins  -- Renally dose medications

## 2023-06-19 NOTE — PROGRESS NOTES
[facility] - Tele[facility] Stepdown  Nephrology  Progress Note    Patient Name: Mariann Huff  MRN: [id number]  Admission Date: 6/9/2023  Hospital Length of Stay: 10 days  Attending Provider: Zoë Treadwell MD   Primary Care Physician: Jasbir Haney MD  Principal Problem:<principal problem not specified>    Subjective:     HPI: Mariann Huff is a 63 y.o. F with CAD s/p PCI, HTN, HLD, DM, MURTAZA, PAD, AF, DVT, and recent pancreatitis who presents with N/V. N/V progressively worsening with PO intolerance following recent discharge for pancreatitis approximately 2 weeks prior to presentation. Denied abdominal pain. Admitted with pancreatitis and HHS. Received IVF and insulin gtt transitioned to subq. Four days ago developed elevated LFTs. MRCP unrevealing. Associated leukocytosis. Initiated on Zosyn. AES planning EUS /ERCP today out of concern for recurrent pancreatitis of unknown etiology. Yesterday was difficult to arouse which resolved spontaneously. Began having intermittent hypotension late yesterday which continued to today as low at 70/40s. Received 1 L NS over five hours overnight. CCM consulted today, noted swings in BP from low with decreased mentation and improving with waking up. AST /ALT downtrending. Bili up slightly to 6.5. Cr uptrending from 1.3 on arrival > 1.2, and yesterday up to 2.0, then 2.5 today from her baseline on ~ 1.0-1.3. Midodrine 10 mg TID was initiated today and Zosyn continued. MRI with contrast on 6/11/23. No other contrasted studies or nephrotoxins noted. Nephrology consulted for PAPA.      Interval History: Urine output 1.2L overnight with improvement in renal function (3.3 from 3.8)    Review of patient's allergies indicates:   Allergen Reactions    Milk containing products (dairy)      Causes GI distress     Current Facility-Administered Medications   Medication Frequency    acetaminophen tablet 650 mg Q4H PRN    albuterol inhaler 2 puff Q6H PRN    ciprofloxacin HCl tablet 500 mg Daily     dextrose 10% bolus 125 mL 125 mL PRN    dextrose 10% bolus 250 mL 250 mL PRN    dextrose 40 % gel 15,000 mg PRN    dextrose 40 % gel 30,000 mg PRN    famotidine tablet 20 mg Daily    glucagon (human recombinant) injection 1 mg PRN    insulin aspart U-100 pen 0-5 Units PRN    insulin detemir U-100 (Levemir) pen 16 Units QHS    melatonin tablet 6 mg Nightly PRN    metroNIDAZOLE tablet 500 mg Q8H    midodrine tablet 10 mg TID WM    ondansetron injection 4 mg Q6H PRN    prochlorperazine injection Soln 5 mg Q6H PRN    sodium chloride 0.9% flush 10 mL PRN     Facility-Administered Medications Ordered in Other Encounters   Medication Frequency    0.9%  NaCl infusion Continuous       Objective:     Vital Signs (Most Recent):  Temp: 98.3 °F (36.8 °C) (06/19/23 0738)  Pulse: 71 (06/19/23 0738)  Resp: 18 (06/19/23 0738)  BP: (!) 179/84 (06/19/23 0738)  SpO2: 95 % (06/19/23 0738) Vital Signs (24h Range):  Temp:  [96.9 °F (36.1 °C)-98.5 °F (36.9 °C)] 98.3 °F (36.8 °C)  Pulse:  [68-80] 71  Resp:  [18] 18  SpO2:  [92 %-100 %] 95 %  BP: (113-181)/(58-86) 179/84     Weight: 62.8 kg (138 lb 7.2 oz) (06/09/23 2311)  Body mass index is 23.76 kg/m².  Body surface area is 1.68 meters squared.    I/O last 3 completed shifts:  In: 480 [P.O.:480]  Out: 1200 [Urine:1200]     Physical Exam  Constitutional:       Appearance: Normal appearance.   HENT:      Head: Normocephalic and atraumatic.      Mouth/Throat:      Mouth: Mucous membranes are moist.      Pharynx: Oropharynx is clear.   Eyes:      Extraocular Movements: Extraocular movements intact.      Pupils: Pupils are equal, round, and reactive to light.   Cardiovascular:      Rate and Rhythm: Normal rate and regular rhythm.      Pulses: Normal pulses.      Heart sounds: Normal heart sounds.   Pulmonary:      Effort: Pulmonary effort is normal. No respiratory distress.      Breath sounds: Normal breath sounds.   Abdominal:      General: Abdomen is flat. Bowel sounds are normal.       Palpations: Abdomen is soft.   Musculoskeletal:         General: No swelling.      Cervical back: Neck supple.   Skin:     General: Skin is warm and dry.      Capillary Refill: Capillary refill takes less than 2 seconds.   Neurological:      General: No focal deficit present.      Mental Status: She is alert and oriented to person, place, and time. Mental status is at baseline.   Psychiatric:         Mood and Affect: Mood normal.         Behavior: Behavior normal.         Thought Content: Thought content normal.         Judgment: Judgment normal.        Significant Labs:  All labs within the past 24 hours have been reviewed.     Significant Imaging:  All imaging within the past 24 hours has been reviewed.    Assessment/Plan:     Renal/  PAPA (acute kidney injury)  Patient with baseline creatine 1.0-1.3, on admission 1.7 and worsened to peak 3.8 on 6/19. Now improving    Suspect PAPA likely ischemic ATN in s/o hemodynamic instability 2/2 sepsis due to intra-abdominal source    6/13 UA with trace protein, 4+ glu, 2+ ketones, trace blood, 1+ bili, few bacteria  Urine microscopy preformed in nephro lab on 6/16/23 with many granular casts, consistent with ATN  RP US with mild to moderate left hydronephrosis and distended bladder on 6/16, bedside ultrasound 6/19 with similar finding    Recommendations:  -- Continue daily or qshift bladder scans and consider correa catheter if volume >300ml on bladder scan    -- Would recommend discontinuing scheduled midodrine as patient has been hypertensive, can consider keeping as PRN if needed   -- Strict intake and output  -- Maintain hemodynamic stability  -- Avoid nephrotoxins  -- Renally dose medications    Hydronephrosis  Patient with mild to moderate hydronephrosis noted 6/16 and not seen in previous RP US in 10/2022 with known previous left ureteral injury s/p ureteral reimplantation and follows with urology. Do not suspect it is main  of PAPA but may be  contributing.    Recommendations:  -- recommend urology consultation             Thank you for your consult. I will follow-up with patient. Please contact us if you have any additional questions.    Vance Huston MD  Nephrology  [facility] - Telemetry Stepdown    ATTENDING PHYSICIAN ATTESTATION  I have personally verified the history and examined the patient. I thoroughly reviewed the demographic, clinical, laboratorial and imaging information available in medical records. I agree with the assessment and recommendations provided by the subspecialty resident who was under my supervision.

## 2023-06-19 NOTE — ASSESSMENT & PLAN NOTE
Hypertensive on admission,  Still with high BP frequently  On multiple anti-HTNs outpatient  Management per primary

## 2023-06-19 NOTE — ASSESSMENT & PLAN NOTE
"Lipase >2000 on presentation, downtrending without any identifiable cause for elevation. Patient without any abdominal pain. Discontinue HCTZ as it could potentially be cause of pancreatitis as well  - US abdomen with gallbladder sludge and "prominence" of the pancreatic duct   - liver enzymes, alk-phos, bilirubin elevated on 06/11, suggestive of possible choledocholithiasis as cause of pancreatitis.  MRCP ordered  - MRCP on my personal interpretation without any focal obstruction or retained stones, however liver enzymes continue to elevate  - AES was consulted for findings and after discussion, recommended ERCP/EUS once off apixaban and pletal which was completed on 6/15 - biliary sludging found, CBD dilated.   - General surgery assessed for cholecystectomy - family would like to wait until outpatient to schedule.   - completing course oral cipro/flagyl       "

## 2023-06-19 NOTE — ASSESSMENT & PLAN NOTE
- She has now had two episodes of pancreatitis on Trulicity, however suspicion for choledocholithiasis as cause for pancreatitis  - Laparoscopic cholecystectomy planned although date TBD - possibly outpatient surgery after d/c per primary   - Hold Trulicity at discharge

## 2023-06-19 NOTE — ASSESSMENT & PLAN NOTE
- hold pletal, ASA, and statin  - started on apixaban for PAD outpatient as well as a fib  - restarted apixaban on 6/19

## 2023-06-19 NOTE — PROGRESS NOTES
[facility] - Telemetry Stepdown  Endocrinology  Progress Note    Admit Date: 2023     63 year old  female with type 2 diabetes mellitus, CKDIV, CAD, hypertension, hyperlipidemia, MURTAZA (not on CPAP), PAD, Afib and hx of DVT on Eliquis and recent admission for pancreatitis who presents with HHS and developing possible biliary obstruction.     - Patient recently admitted  to  for acute pancreatitis attributed to Trulicity, which was discontinued upon d/c. Per pt's PCP this was 2nd episode of pancreatitis while on trulicity -- prior episode when increasing dose of Trulicity.   Pt states upon d/c  she was feeling slight improvement but still having intermittent n/v and not tolerating normal diet. N/V began to get worse in last few days which prompted admission. No abdominal pain on presentation for current admission, unlike last admit when she had severe abd pain for pancreatitis.    In the ED patient afebrile and hemodynamically stable saturating well on room air. Lipase >2000. BG >700 and serum osmo 326 after 1L IVFs. B-hydroxybutyrate 1.3. pH 7.4 and bicarb 34. AG 12. Patient started on aggressive IVFs and insulin gtt for HHS.     - Endocrinology consulted for initial HHS and glucose management      Regarding Type 2 Diabetes Mellitus:    - Initially diagnosed with Type 2 diabetes mellitus: Over 20 years prior  - Home diabetic medications include: Farxiga 10mg qd, Glipizide ER 10mg daily, Levemir 20u qhs (but never started). She has been on MDI previously, most recently she was on Toujeo and Humalog discontinued 2021 but she states she was on insulin up until about 10mo ago.  - Family History: Not asked  - Weight based dosin kg x 0.4 (CKD) = 25 TDD x 0.5 = 12 basal / 12 prandial  - 1700/TDD = 68 (estimated insulin sensitivity factor)  - 450/TDD = 18 (estimated starting carb ratio for prandial dosing)    Lab Results   Component Value Date    HGBA1C 8.1 (H) 2023    HGBA1C 8.2  "(H) 2023    HGBA1C 8.8 (H) 2023    CPEPTIDE 1.36 2017     Lab Results   Component Value Date    EGFRNORACEVR 12.8 (A) 2023    EGFRNORACEVR 16.4 (A) 2023    EGFRNORACEVR 19.3 (A) 2023       Interval HPI:   Overnight events: Appetite improving. Adding prandial insulin today. Lap Aimee date still pending, may be done outpatient per primary.  Eatin%  Nausea: No  Hypoglycemia and intervention: No  Fever: No  TPN and/or TF: No  If yes, type of TF/TPN and rate: n/a    BP (!) 176/86 (BP Location: Right arm, Patient Position: Lying)   Pulse 70   Temp 96.1 °F (35.6 °C) (Oral)   Resp 18   Ht 5' 4" (1.626 m)   Wt 62.8 kg (138 lb 7.2 oz)   LMP  (LMP Unknown)   SpO2 96%   Breastfeeding No   BMI 23.76 kg/m²     Labs Reviewed and Include    Recent Labs   Lab 23  0411   *   CALCIUM 9.3   ALBUMIN 1.6*   PROT 6.4      K 3.2*   CO2 20*      BUN 44*   CREATININE 3.3*   ALKPHOS 869*   ALT 88*   AST 80*   BILITOT 1.9*     Lab Results   Component Value Date    WBC 9.95 2023    HGB 9.6 (L) 2023    HCT 30.7 (L) 2023    MCV 84 2023     2023     No results for input(s): TSH, FREET4 in the last 168 hours.  Lab Results   Component Value Date    HGBA1C 8.1 (H) 2023       Nutritional status:   Body mass index is 23.76 kg/m².  Lab Results   Component Value Date    ALBUMIN 1.6 (L) 2023    ALBUMIN 1.5 (L) 2023    ALBUMIN 1.6 (L) 2023     No results found for: PREALBUMIN    Estimated Creatinine Clearance: 15.3 mL/min (A) (based on SCr of 3.3 mg/dL (H)).    Accu-Checks  Recent Labs     23  1503 23  1657 23  2130 23  0814 23  1222 23  1715 23  2121 23  0741 23  1159 23  1536   POCTGLUCOSE 245* 225* 189* 145* 222* 215* 210* 146* 252* 243*       Current Medications and/or Treatments Impacting Glycemic Control  Immunotherapy:    Immunosuppressants       None "          Steroids:   Hormones (From admission, onward)      Start     Stop Route Frequency Ordered    06/09/23 2028  melatonin tablet 6 mg         -- Oral Nightly PRN 06/09/23 1931          Pressors:    Autonomic Drugs (From admission, onward)      None          Hyperglycemia/Diabetes Medications:   Antihyperglycemics (From admission, onward)      Start     Stop Route Frequency Ordered    06/19/23 1645  insulin aspart U-100 pen 3 Units         -- SubQ 3 times daily with meals 06/19/23 1417    06/17/23 2100  insulin detemir U-100 (Levemir) pen 16 Units         -- SubQ Nightly 06/17/23 0807    06/10/23 1315  insulin regular in 0.9 % NaCl 100 unit/100 mL (1 unit/mL) infusion        Question:  Enter initial dose (Units/hr):  Answer:  0.6    06/10 2200 IV Continuous 06/10/23 1211    06/10/23 1309  insulin aspart U-100 pen 0-5 Units         -- SubQ As needed (PRN) 06/10/23 1211            ASSESSMENT and PLAN    Cardiac/Vascular  Hyperlipidemia associated with type 2 diabetes mellitus  - On Lipitor 40mg and Repatha outpatient  - Management per primary    Hypertension associated with diabetes  Hypertensive on admission,  Still with high BP frequently  On multiple anti-HTNs outpatient  Management per primary      Renal/  PAPA (acute kidney injury)  - Improved/resolved. Likely 2/2 IVVD 2/2 HHS on admission  - Management per primary    Endocrine  Type 2 diabetes mellitus with chronic kidney disease  - HHS on admission. Recent pancreatitis admission, not yet back to normal po intake with worsening hepatic function. Taking Glipizide and Farxiga at home. Never picked up Levemir Rx recently prescribed by her PCP    - 24 hour glucose trend: 100s-low 200s    - Continue Levemir 16 units at night  - Start Novolog 3 units before meals, continue low dose correction Novolog prn  - Accuchecks ac/hs    - If blood glucose greater than 300, please ask patient not to eat food or drink anything other than water until correctional insulin has  brought it back below 250    **Please notify endocrine of change in diet or plans for NPO for surgery (or any other reason) as this may change her insulin requirement**    GI  History of pancreatitis  - She has now had two episodes of pancreatitis on Trulicity, however suspicion for choledocholithiasis as cause for pancreatitis  - Laparoscopic cholecystectomy planned although date TBD - possibly outpatient surgery after d/c per primary   - Hold Trulicity at discharge        Jerilyn Harrison MD  Endocrinology  [facility] - Tele[facility] Stepdown

## 2023-06-19 NOTE — SUBJECTIVE & OBJECTIVE
Interval History: Urine output 1.2L overnight with improvement in renal function (3.3 from 3.8)    Review of patient's allergies indicates:   Allergen Reactions    Milk containing products (dairy)      Causes GI distress     Current Facility-Administered Medications   Medication Frequency    acetaminophen tablet 650 mg Q4H PRN    albuterol inhaler 2 puff Q6H PRN    ciprofloxacin HCl tablet 500 mg Daily    dextrose 10% bolus 125 mL 125 mL PRN    dextrose 10% bolus 250 mL 250 mL PRN    dextrose 40 % gel 15,000 mg PRN    dextrose 40 % gel 30,000 mg PRN    famotidine tablet 20 mg Daily    glucagon (human recombinant) injection 1 mg PRN    insulin aspart U-100 pen 0-5 Units PRN    insulin detemir U-100 (Levemir) pen 16 Units QHS    melatonin tablet 6 mg Nightly PRN    metroNIDAZOLE tablet 500 mg Q8H    midodrine tablet 10 mg TID WM    ondansetron injection 4 mg Q6H PRN    prochlorperazine injection Soln 5 mg Q6H PRN    sodium chloride 0.9% flush 10 mL PRN     Facility-Administered Medications Ordered in Other Encounters   Medication Frequency    0.9%  NaCl infusion Continuous       Objective:     Vital Signs (Most Recent):  Temp: 98.3 °F (36.8 °C) (06/19/23 0738)  Pulse: 71 (06/19/23 0738)  Resp: 18 (06/19/23 0738)  BP: (!) 179/84 (06/19/23 0738)  SpO2: 95 % (06/19/23 0738) Vital Signs (24h Range):  Temp:  [96.9 °F (36.1 °C)-98.5 °F (36.9 °C)] 98.3 °F (36.8 °C)  Pulse:  [68-80] 71  Resp:  [18] 18  SpO2:  [92 %-100 %] 95 %  BP: (113-181)/(58-86) 179/84     Weight: 62.8 kg (138 lb 7.2 oz) (06/09/23 2311)  Body mass index is 23.76 kg/m².  Body surface area is 1.68 meters squared.    I/O last 3 completed shifts:  In: 480 [P.O.:480]  Out: 1200 [Urine:1200]     Physical Exam  Constitutional:       Appearance: Normal appearance.   HENT:      Head: Normocephalic and atraumatic.      Mouth/Throat:      Mouth: Mucous membranes are moist.      Pharynx: Oropharynx is clear.   Eyes:      Extraocular Movements: Extraocular movements  intact.      Pupils: Pupils are equal, round, and reactive to light.   Cardiovascular:      Rate and Rhythm: Normal rate and regular rhythm.      Pulses: Normal pulses.      Heart sounds: Normal heart sounds.   Pulmonary:      Effort: Pulmonary effort is normal. No respiratory distress.      Breath sounds: Normal breath sounds.   Abdominal:      General: Abdomen is flat. Bowel sounds are normal.      Palpations: Abdomen is soft.   Musculoskeletal:         General: No swelling.      Cervical back: Neck supple.   Skin:     General: Skin is warm and dry.      Capillary Refill: Capillary refill takes less than 2 seconds.   Neurological:      General: No focal deficit present.      Mental Status: She is alert and oriented to person, place, and time. Mental status is at baseline.   Psychiatric:         Mood and Affect: Mood normal.         Behavior: Behavior normal.         Thought Content: Thought content normal.         Judgment: Judgment normal.        Significant Labs:  All labs within the past 24 hours have been reviewed.     Significant Imaging:  All imaging within the past 24 hours has been reviewed.

## 2023-06-19 NOTE — ASSESSMENT & PLAN NOTE
Patient with mild to moderate hydronephrosis noted 6/16 and not seen in previous RP US in 10/2022 with known previous left ureteral injury s/p ureteral reimplantation and follows with urology. Do not suspect it is main  of PAPA but may be contributing.    Recommendations:  -- follow up RP US and consider urology consultation

## 2023-06-19 NOTE — ASSESSMENT & PLAN NOTE
Creatinine 1.7 on presentation ; baseline 1.0  - IVFs stopped, recurrent PAPA on 6/12 with FeNA indication pre-renal disease. 1L LR bolus ordered over 3 hrs  - worsening renal function in setting of hypotension on 6/15 - ATN felt present by Nephology in conjunction with some urinary retention issues.   - repeating renal ultrasound to assess if improved urination has improved size of left ureter.   - Nephrology team following

## 2023-06-19 NOTE — PROGRESS NOTES
[facility] - Tele[facility] Medicine  Progress Note    Patient Name: Mariann Huff  MRN: [id number]  Patient Class: IP- Inpatient   Admission Date: 6/9/2023  Length of Stay: 10 days  Attending Physician: Zoë Treadwell MD  Primary Care Provider: Jasbir Haney MD        Subjective:     Principal Problem:<principal problem not specified>        HPI:  63F with PMH of CAD with GINGER , HTN, HLD, DM2, MURTAZA (not on CPAP), PAD, Afib and hx of DVT on Eliquis and recent admission for pancreatitis wo presents to the ED with nausea and vomiting. Patient recently admitted 05/25 to 05/26 for first episode of acute pancreatitis. Lipase >2000 at that time. CT abdomen and US without evidence of biliary obstruction or pancreatic inflammation. Lipid panel wnl. Patient pain and n/v improved with treatment for acute pancreatitis felt secondary to home med Trulicity. She was discharged with new insulin regimen which she reports she never picked up from pharmacy so has been taking farxiga only at home. States that she has had progressively worsening n/v and po intolerance. States that she is not having severe pain similar to recent pancreatitis. Denies fevers, chills, chest pain, shortness of breath.     In the ED patient afebrile and hemodynamically stable saturating well on room air. Lipase >2000. BG >700 and serum osmo 326 after 1L IVFs. B-hydroxybutyrate 1.3. pH 7.4 and bicarb 34. AG 12. Patient started on aggressive IVFs and insulin gtt for management on pancreatitis and HHS. Admitted to the care of medicine for further evaluation and management.       Overview/Hospital Course:  Patient transitioned from insulin drip to subcutaneous insulin on 06/10.  On 06/11, patient developed new hyperbilirubinemia and worsening liver enzymes and given history of pancreatitis concern for possible choledocholithiasis.  MRCP ordered did not demonstrate any focal obstruction showever liver enzymes and alk phos increasing in size. In speaking  with AES, patient to be off apixaban and pletal prior to ERCP.      Interval History:   6/19: PAPA better. Patient without other complaints    Review of Systems   Constitutional:  Negative for chills and fever.   HENT:  Negative for congestion and sore throat.    Eyes:  Negative for photophobia and visual disturbance.   Respiratory:  Negative for cough and shortness of breath.    Cardiovascular:  Negative for chest pain and palpitations.   Gastrointestinal:  Negative for abdominal pain, constipation, diarrhea, nausea and vomiting.   Endocrine: Negative for cold intolerance and heat intolerance.   Genitourinary:  Negative for dysuria and hematuria.   Musculoskeletal:  Negative for arthralgias and myalgias.   Skin:  Negative for rash.   Allergic/Immunologic: Negative for environmental allergies and food allergies.   Neurological:  Negative for dizziness, seizures, syncope and headaches.   Hematological:  Negative for adenopathy. Does not bruise/bleed easily.   Psychiatric/Behavioral:  Negative for hallucinations and suicidal ideas.    Objective:     Vital Signs (Most Recent):  Temp: 96.1 °F (35.6 °C) (06/19/23 1537)  Pulse: 70 (06/19/23 1537)  Resp: 18 (06/19/23 1537)  BP: (!) 176/86 (06/19/23 1537)  SpO2: 96 % (06/19/23 1537) Vital Signs (24h Range):  Temp:  [96.1 °F (35.6 °C)-98.3 °F (36.8 °C)] 96.1 °F (35.6 °C)  Pulse:  [68-80] 70  Resp:  [18] 18  SpO2:  [92 %-100 %] 96 %  BP: (113-182)/(58-86) 176/86     Weight: 62.8 kg (138 lb 7.2 oz)  Body mass index is 23.76 kg/m².    Intake/Output Summary (Last 24 hours) at 6/19/2023 1603  Last data filed at 6/19/2023 0620  Gross per 24 hour   Intake --   Output 1200 ml   Net -1200 ml         Physical Exam  Constitutional:       Appearance: She is well-developed.   HENT:      Head: Normocephalic and atraumatic.   Eyes:      Conjunctiva/sclera: Conjunctivae normal.      Pupils: Pupils are equal, round, and reactive to light.   Neck:      Thyroid: No thyromegaly.      Vascular:  No JVD.   Cardiovascular:      Rate and Rhythm: Normal rate and regular rhythm.      Heart sounds: Normal heart sounds. No murmur heard.    No friction rub. No gallop.   Pulmonary:      Effort: Pulmonary effort is normal.      Breath sounds: Normal breath sounds. No wheezing or rales.   Abdominal:      General: Bowel sounds are normal. There is no distension.      Palpations: Abdomen is soft.      Tenderness: There is no abdominal tenderness. There is no guarding or rebound.   Musculoskeletal:         General: No tenderness. Normal range of motion.      Cervical back: Neck supple.   Skin:     General: Skin is warm and dry.   Neurological:      Mental Status: She is oriented to person, place, and time. She is lethargic and confused.      Cranial Nerves: No cranial nerve deficit.      Comments: + asterixis   Psychiatric:         Behavior: Behavior normal.           Significant Labs: All pertinent labs within the past 24 hours have been reviewed.    Significant Imaging: I have reviewed all pertinent imaging results/findings within the past 24 hours.      Assessment/Plan:      ATN (acute tubular necrosis)  Patient with acute kidney injury likely due to acute tubular necrosis PAPA is currently improving. Labs reviewed- Renal function/electrolytes with Estimated Creatinine Clearance: 15.3 mL/min (A) (based on SCr of 3.3 mg/dL (H)). according to latest data. Monitor urine output and serial BMP and adjust therapy as needed. Avoid nephrotoxins and renally dose meds for GFR listed above.       Hyperkalemia  stable      Hyperbilirubinemia  - AES completed ERCP with intervention per above      History of pancreatitis  See above      History of DVT (deep vein thrombosis)  - restarting home eliquis on 6/19 now surgery deferred      Type 2 diabetes mellitus with chronic kidney disease  IP HHS/DKA Pathway initiated. On presentation BG >700 and serum osmo 326 after 1L IVFs. B-hydroxybutyrate 1.3. pH 7.4 and bicarb 34. AG 12. Patient  "was started on insulin drip on admission but later in the day, patient transitioned to Levemir 14 units at night and aspart 3 units with meals.  Started on diabetic diet  - patient currently on Levemir 20 units nightly, 10 units aspart with meals  - further insulin titration per endocrinology  - POC AC/HS      Acute recurrent pancreatitis  Lipase >2000 on presentation, downtrending without any identifiable cause for elevation. Patient without any abdominal pain. Discontinue HCTZ as it could potentially be cause of pancreatitis as well  - US abdomen with gallbladder sludge and "prominence" of the pancreatic duct   - liver enzymes, alk-phos, bilirubin elevated on 06/11, suggestive of possible choledocholithiasis as cause of pancreatitis.  MRCP ordered  - MRCP on my personal interpretation without any focal obstruction or retained stones, however liver enzymes continue to elevate  - AES was consulted for findings and after discussion, recommended ERCP/EUS once off apixaban and pletal which was completed on 6/15 - biliary sludging found, CBD dilated.   - General surgery assessed for cholecystectomy - family would like to wait until outpatient to schedule.   - completing course oral cipro/flagyl         PAD (peripheral artery disease)  - hold pletal, ASA, and statin  - started on apixaban for PAD outpatient as well as a fib  - restarted apixaban on 6/19    Hydronephrosis  - repeat renal ultrasound ordered for 6/19 to assess      Asthma  - albuterol prn  - stable on room air        Acute encephalopathy  Perhaps related to sepsis.  Patient on 6/14 a.m., increasingly lethargic.  Per , last night she had jerking movements when attempting to go to the restroom with assistance.  On exam this morning she had asterixis and required sternal rubbing to awaken her.  - Ammonia was ordered and was normal for the past 2 days, patient has not had a bowel movement in over a week per the patient's .  We will give lactulose " enema regardless of ammonia level  - VBG ordered, personal interpretation reflects no evidence of hypercapnic respiratory failure  - blood cultures were collected yesterday x2, no growth today  - escalated patient's antibiotics from ceftriaxone and metronidazole to Zosyn given her increasing white blood cell count      PAPA (acute kidney injury)  Creatinine 1.7 on presentation ; baseline 1.0  - IVFs stopped, recurrent PAPA on 6/12 with FeNA indication pre-renal disease. 1L LR bolus ordered over 3 hrs  - worsening renal function in setting of hypotension on 6/15 - ATN felt present by Nephology in conjunction with some urinary retention issues.   - repeating renal ultrasound to assess if improved urination has improved size of left ureter.   - Nephrology team following      Hyperlipidemia associated with type 2 diabetes mellitus  Holding statin    Hypertension associated with diabetes  Restarting home amlodipine on 6/19      VTE Risk Mitigation (From admission, onward)         Ordered     apixaban tablet 2.5 mg  2 times daily         06/19/23 1603                Discharge Planning   REBECCA: 6/21/2023     Code Status: Full Code   Is the patient medically ready for discharge?: No    Reason for patient still in hospital (select all that apply): Patient trending condition  Discharge Plan A: Home with family   Discharge Delays: None known at this time              Zoë Treadwell MD  Department of Hospital Medicine   [facility] - Tele[facility]

## 2023-06-19 NOTE — ASSESSMENT & PLAN NOTE
- HHS on admission. Recent pancreatitis admission, not yet back to normal po intake with worsening hepatic function. Taking Glipizide and Farxiga at home. Never picked up Levemir Rx recently prescribed by her PCP    - 24 hour glucose trend: 100s-low 200s    - Continue Levemir 16 units at night  - Start Novolog 3 units before meals, continue low dose correction Novolog prn  - Accuchecks ac/hs    - If blood glucose greater than 300, please ask patient not to eat food or drink anything other than water until correctional insulin has brought it back below 250    **Please notify endocrine of change in diet or plans for NPO for surgery (or any other reason) as this may change her insulin requirement**

## 2023-06-19 NOTE — SUBJECTIVE & OBJECTIVE
"Interval HPI:   Overnight events: Appetite improving. Adding prandial insulin today. Lap Aimee date still pending, may be done outpatient per primary.  Eatin%  Nausea: No  Hypoglycemia and intervention: No  Fever: No  TPN and/or TF: No  If yes, type of TF/TPN and rate: n/a    BP (!) 176/86 (BP Location: Right arm, Patient Position: Lying)   Pulse 70   Temp 96.1 °F (35.6 °C) (Oral)   Resp 18   Ht 5' 4" (1.626 m)   Wt 62.8 kg (138 lb 7.2 oz)   LMP  (LMP Unknown)   SpO2 96%   Breastfeeding No   BMI 23.76 kg/m²     Labs Reviewed and Include    Recent Labs   Lab 23  0411   *   CALCIUM 9.3   ALBUMIN 1.6*   PROT 6.4      K 3.2*   CO2 20*      BUN 44*   CREATININE 3.3*   ALKPHOS 869*   ALT 88*   AST 80*   BILITOT 1.9*     Lab Results   Component Value Date    WBC 9.95 2023    HGB 9.6 (L) 2023    HCT 30.7 (L) 2023    MCV 84 2023     2023     No results for input(s): TSH, FREET4 in the last 168 hours.  Lab Results   Component Value Date    HGBA1C 8.1 (H) 2023       Nutritional status:   Body mass index is 23.76 kg/m².  Lab Results   Component Value Date    ALBUMIN 1.6 (L) 2023    ALBUMIN 1.5 (L) 2023    ALBUMIN 1.6 (L) 2023     No results found for: PREALBUMIN    Estimated Creatinine Clearance: 15.3 mL/min (A) (based on SCr of 3.3 mg/dL (H)).    Accu-Checks  Recent Labs     23  1503 23  1657 23  2130 23  0814 23  1222 23  1715 23  2121 23  0741 23  1159 23  1536   POCTGLUCOSE 245* 225* 189* 145* 222* 215* 210* 146* 252* 243*       Current Medications and/or Treatments Impacting Glycemic Control  Immunotherapy:    Immunosuppressants       None          Steroids:   Hormones (From admission, onward)      Start     Stop Route Frequency Ordered    23  melatonin tablet 6 mg         -- Oral Nightly PRN 23 193          Pressors:    Autonomic Drugs (From " admission, onward)      None          Hyperglycemia/Diabetes Medications:   Antihyperglycemics (From admission, onward)      Start     Stop Route Frequency Ordered    06/19/23 1645  insulin aspart U-100 pen 3 Units         -- SubQ 3 times daily with meals 06/19/23 1417    06/17/23 2100  insulin detemir U-100 (Levemir) pen 16 Units         -- SubQ Nightly 06/17/23 0807    06/10/23 1315  insulin regular in 0.9 % NaCl 100 unit/100 mL (1 unit/mL) infusion        Question:  Enter initial dose (Units/hr):  Answer:  0.6    06/10 2200 IV Continuous 06/10/23 1211    06/10/23 1309  insulin aspart U-100 pen 0-5 Units         -- SubQ As needed (PRN) 06/10/23 1211

## 2023-06-19 NOTE — PLAN OF CARE
Problem: Adult Inpatient Plan of Care  Goal: Plan of Care Review  Outcome: Ongoing, Progressing  Goal: Patient-Specific Goal (Individualized)  Outcome: Ongoing, Progressing  Goal: Absence of Hospital-Acquired Illness or Injury  Outcome: Ongoing, Progressing  Goal: Optimal Comfort and Wellbeing  Outcome: Ongoing, Progressing  Goal: Readiness for Transition of Care  Outcome: Ongoing, Progressing    Patient alert and oriented x 4. No acute changes occur this shift. No fall this shift. Call light within reach. Will continue to follow POC and modify as needed.

## 2023-06-19 NOTE — ASSESSMENT & PLAN NOTE
Patient with acute kidney injury likely due to acute tubular necrosis PAPA is currently improving. Labs reviewed- Renal function/electrolytes with Estimated Creatinine Clearance: 15.3 mL/min (A) (based on SCr of 3.3 mg/dL (H)). according to latest data. Monitor urine output and serial BMP and adjust therapy as needed. Avoid nephrotoxins and renally dose meds for GFR listed above.

## 2023-06-20 PROBLEM — E11.00 HYPEROSMOLAR HYPERGLYCEMIC STATE (HHS): Status: ACTIVE | Noted: 2023-06-20

## 2023-06-20 PROBLEM — E87.6 HYPOKALEMIA: Status: ACTIVE | Noted: 2023-06-16

## 2023-06-20 PROBLEM — E87.5 HYPERKALEMIA: Status: ACTIVE | Noted: 2023-06-20

## 2023-06-20 LAB
ALBUMIN SERPL BCP-MCNC: 1.9 G/DL (ref 3.5–5.2)
ALP SERPL-CCNC: 893 U/L (ref 55–135)
ALT SERPL W/O P-5'-P-CCNC: 83 U/L (ref 10–44)
ANION GAP SERPL CALC-SCNC: 12 MMOL/L (ref 8–16)
ANISOCYTOSIS BLD QL SMEAR: SLIGHT
AST SERPL-CCNC: 81 U/L (ref 10–40)
BASOPHILS # BLD AUTO: 0.07 K/UL (ref 0–0.2)
BASOPHILS NFR BLD: 0.5 % (ref 0–1.9)
BILIRUB SERPL-MCNC: 1.9 MG/DL (ref 0.1–1)
BUN SERPL-MCNC: 37 MG/DL (ref 8–23)
CALCIUM SERPL-MCNC: 9.9 MG/DL (ref 8.7–10.5)
CHLORIDE SERPL-SCNC: 106 MMOL/L (ref 95–110)
CO2 SERPL-SCNC: 24 MMOL/L (ref 23–29)
CREAT SERPL-MCNC: 2.5 MG/DL (ref 0.5–1.4)
DIFFERENTIAL METHOD: ABNORMAL
EOSINOPHIL # BLD AUTO: 0.5 K/UL (ref 0–0.5)
EOSINOPHIL NFR BLD: 4.1 % (ref 0–8)
ERYTHROCYTE [DISTWIDTH] IN BLOOD BY AUTOMATED COUNT: 15.1 % (ref 11.5–14.5)
EST. GFR  (NO RACE VARIABLE): 21.2 ML/MIN/1.73 M^2
GLUCOSE SERPL-MCNC: 149 MG/DL (ref 70–110)
HCT VFR BLD AUTO: 32.9 % (ref 37–48.5)
HGB BLD-MCNC: 10.7 G/DL (ref 12–16)
IMM GRANULOCYTES # BLD AUTO: 0.36 K/UL (ref 0–0.04)
IMM GRANULOCYTES NFR BLD AUTO: 2.8 % (ref 0–0.5)
LYMPHOCYTES # BLD AUTO: 1.8 K/UL (ref 1–4.8)
LYMPHOCYTES NFR BLD: 13.8 % (ref 18–48)
MAGNESIUM SERPL-MCNC: 2 MG/DL (ref 1.6–2.6)
MCH RBC QN AUTO: 27 PG (ref 27–31)
MCHC RBC AUTO-ENTMCNC: 32.5 G/DL (ref 32–36)
MCV RBC AUTO: 83 FL (ref 82–98)
MONOCYTES # BLD AUTO: 1.4 K/UL (ref 0.3–1)
MONOCYTES NFR BLD: 10.8 % (ref 4–15)
NEUTROPHILS # BLD AUTO: 8.7 K/UL (ref 1.8–7.7)
NEUTROPHILS NFR BLD: 68 % (ref 38–73)
NRBC BLD-RTO: 0 /100 WBC
PHOSPHATE SERPL-MCNC: 2.5 MG/DL (ref 2.7–4.5)
PLATELET # BLD AUTO: 342 K/UL (ref 150–450)
PLATELET BLD QL SMEAR: ABNORMAL
PMV BLD AUTO: 10.4 FL (ref 9.2–12.9)
POCT GLUCOSE: 166 MG/DL (ref 70–110)
POCT GLUCOSE: 203 MG/DL (ref 70–110)
POCT GLUCOSE: 204 MG/DL (ref 70–110)
POCT GLUCOSE: 210 MG/DL (ref 70–110)
POLYCHROMASIA BLD QL SMEAR: ABNORMAL
POTASSIUM SERPL-SCNC: 3 MMOL/L (ref 3.5–5.1)
PROT SERPL-MCNC: 7.5 G/DL (ref 6–8.4)
RBC # BLD AUTO: 3.96 M/UL (ref 4–5.4)
SODIUM SERPL-SCNC: 142 MMOL/L (ref 136–145)
WBC # BLD AUTO: 12.83 K/UL (ref 3.9–12.7)

## 2023-06-20 PROCEDURE — 83735 ASSAY OF MAGNESIUM: CPT | Performed by: FAMILY MEDICINE

## 2023-06-20 PROCEDURE — 99232 PR SUBSEQUENT HOSPITAL CARE,LEVL II: ICD-10-PCS | Mod: FS,,, | Performed by: INTERNAL MEDICINE

## 2023-06-20 PROCEDURE — 99222 1ST HOSP IP/OBS MODERATE 55: CPT | Mod: FS,,, | Performed by: UROLOGY

## 2023-06-20 PROCEDURE — 80053 COMPREHEN METABOLIC PANEL: CPT | Performed by: STUDENT IN AN ORGANIZED HEALTH CARE EDUCATION/TRAINING PROGRAM

## 2023-06-20 PROCEDURE — 99231 SBSQ HOSP IP/OBS SF/LOW 25: CPT | Mod: FS,,, | Performed by: INTERNAL MEDICINE

## 2023-06-20 PROCEDURE — 99222 PR INITIAL HOSPITAL CARE,LEVL II: ICD-10-PCS | Mod: FS,,, | Performed by: UROLOGY

## 2023-06-20 PROCEDURE — 97161 PT EVAL LOW COMPLEX 20 MIN: CPT

## 2023-06-20 PROCEDURE — 84100 ASSAY OF PHOSPHORUS: CPT | Performed by: FAMILY MEDICINE

## 2023-06-20 PROCEDURE — 63600175 PHARM REV CODE 636 W HCPCS: Performed by: INTERNAL MEDICINE

## 2023-06-20 PROCEDURE — 85025 COMPLETE CBC W/AUTO DIFF WBC: CPT | Performed by: FAMILY MEDICINE

## 2023-06-20 PROCEDURE — 25000003 PHARM REV CODE 250: Performed by: HOSPITALIST

## 2023-06-20 PROCEDURE — 99233 SBSQ HOSP IP/OBS HIGH 50: CPT | Mod: FS,,, | Performed by: STUDENT IN AN ORGANIZED HEALTH CARE EDUCATION/TRAINING PROGRAM

## 2023-06-20 PROCEDURE — 20600001 HC STEP DOWN PRIVATE ROOM

## 2023-06-20 PROCEDURE — 97165 OT EVAL LOW COMPLEX 30 MIN: CPT

## 2023-06-20 PROCEDURE — 94761 N-INVAS EAR/PLS OXIMETRY MLT: CPT

## 2023-06-20 PROCEDURE — 99233 PR SUBSEQUENT HOSPITAL CARE,LEVL III: ICD-10-PCS | Mod: GT,FS,, | Performed by: INTERNAL MEDICINE

## 2023-06-20 PROCEDURE — 99233 PR SUBSEQUENT HOSPITAL CARE,LEVL III: ICD-10-PCS | Mod: FS,,, | Performed by: STUDENT IN AN ORGANIZED HEALTH CARE EDUCATION/TRAINING PROGRAM

## 2023-06-20 PROCEDURE — 99233 SBSQ HOSP IP/OBS HIGH 50: CPT | Mod: GT,FS,, | Performed by: INTERNAL MEDICINE

## 2023-06-20 PROCEDURE — 25000003 PHARM REV CODE 250: Performed by: INTERNAL MEDICINE

## 2023-06-20 PROCEDURE — 51798 US URINE CAPACITY MEASURE: CPT

## 2023-06-20 PROCEDURE — 36415 COLL VENOUS BLD VENIPUNCTURE: CPT | Performed by: FAMILY MEDICINE

## 2023-06-20 PROCEDURE — 97116 GAIT TRAINING THERAPY: CPT

## 2023-06-20 PROCEDURE — 99900035 HC TECH TIME PER 15 MIN (STAT)

## 2023-06-20 PROCEDURE — 25000003 PHARM REV CODE 250: Performed by: STUDENT IN AN ORGANIZED HEALTH CARE EDUCATION/TRAINING PROGRAM

## 2023-06-20 PROCEDURE — 99231 PR SUBSEQUENT HOSPITAL CARE,LEVL I: ICD-10-PCS | Mod: FS,,, | Performed by: INTERNAL MEDICINE

## 2023-06-20 PROCEDURE — 97530 THERAPEUTIC ACTIVITIES: CPT

## 2023-06-20 PROCEDURE — 99232 SBSQ HOSP IP/OBS MODERATE 35: CPT | Mod: FS,,, | Performed by: INTERNAL MEDICINE

## 2023-06-20 RX ORDER — INSULIN ASPART 100 [IU]/ML
5 INJECTION, SOLUTION INTRAVENOUS; SUBCUTANEOUS
Status: DISCONTINUED | OUTPATIENT
Start: 2023-06-20 | End: 2023-06-21

## 2023-06-20 RX ORDER — POTASSIUM CHLORIDE 750 MG/1
30 CAPSULE, EXTENDED RELEASE ORAL ONCE
Status: COMPLETED | OUTPATIENT
Start: 2023-06-20 | End: 2023-06-20

## 2023-06-20 RX ADMIN — APIXABAN 2.5 MG: 2.5 TABLET, FILM COATED ORAL at 09:06

## 2023-06-20 RX ADMIN — AMLODIPINE BESYLATE 10 MG: 10 TABLET ORAL at 09:06

## 2023-06-20 RX ADMIN — FAMOTIDINE 20 MG: 20 TABLET, FILM COATED ORAL at 09:06

## 2023-06-20 RX ADMIN — POTASSIUM CHLORIDE 30 MEQ: 10 CAPSULE, COATED, EXTENDED RELEASE ORAL at 10:06

## 2023-06-20 RX ADMIN — INSULIN ASPART 2 UNITS: 100 INJECTION, SOLUTION INTRAVENOUS; SUBCUTANEOUS at 09:06

## 2023-06-20 RX ADMIN — CIPROFLOXACIN 500 MG: 500 TABLET, FILM COATED ORAL at 09:06

## 2023-06-20 RX ADMIN — INSULIN ASPART 2 UNITS: 100 INJECTION, SOLUTION INTRAVENOUS; SUBCUTANEOUS at 05:06

## 2023-06-20 RX ADMIN — INSULIN ASPART 3 UNITS: 100 INJECTION, SOLUTION INTRAVENOUS; SUBCUTANEOUS at 12:06

## 2023-06-20 RX ADMIN — INSULIN ASPART 2 UNITS: 100 INJECTION, SOLUTION INTRAVENOUS; SUBCUTANEOUS at 12:06

## 2023-06-20 RX ADMIN — INSULIN ASPART 3 UNITS: 100 INJECTION, SOLUTION INTRAVENOUS; SUBCUTANEOUS at 09:06

## 2023-06-20 RX ADMIN — INSULIN ASPART 1 UNITS: 100 INJECTION, SOLUTION INTRAVENOUS; SUBCUTANEOUS at 09:06

## 2023-06-20 RX ADMIN — PIPERACILLIN SODIUM AND TAZOBACTAM SODIUM 4.5 G: 4; .5 INJECTION, POWDER, LYOPHILIZED, FOR SOLUTION INTRAVENOUS at 04:06

## 2023-06-20 RX ADMIN — METRONIDAZOLE 500 MG: 500 TABLET ORAL at 05:06

## 2023-06-20 RX ADMIN — INSULIN ASPART 5 UNITS: 100 INJECTION, SOLUTION INTRAVENOUS; SUBCUTANEOUS at 05:06

## 2023-06-20 RX ADMIN — ASPIRIN 81 MG 81 MG: 81 TABLET ORAL at 09:06

## 2023-06-20 NOTE — PT/OT/SLP EVAL
"Occupational Therapy  Co -  Evaluation and Co - Treatment;   OT Discharge Note    Co-evaluation/treatment performed due to patient's multiple deficits requiring two skilled therapists to appropriately and safely assess patient's strength and endurance while facilitating functional tasks in addition to accommodating for patient's activity tolerance.     Name: Mariann Huff  MRN: [id number]  Admitting Diagnosis: ATN (acute tubular necrosis)  Recent Surgery: Procedure(s) (LRB):  ULTRASOUND, UPPER GI TRACT, ENDOSCOPIC (N/A)  ERCP (ENDOSCOPIC RETROGRADE CHOLANGIOPANCREATOGRAPHY) (N/A) 5 Days Post-Op    Recommendations:     Discharge Recommendations: home  Discharge Equipment Recommendations: none  Barriers to discharge:  None    Assessment:     Mariann Huff is a 63 y.o. female with a medical diagnosis of ATN (acute tubular necrosis). At this time, patient is functioning at their prior level of function and does not require further acute OT services. Pt tolerated therapy session well this date, completing bed mobility with supervision and sit to stand transfer with SBA/RW. Pt completed functional mobility in [facility] with CGA - SBA with RW, and is independent in ADLs at this time.     Plan:     During this hospitalization, patient does not require further acute OT services.  Please re-consult if situation changes.    Plan of Care Reviewed with: patient    Subjective     Chief Complaint: "I want to go home"   Patient/Family Comments/goals: Get better, return home    Occupational Profile:  Living Environment: Pt lives with spouse in  with [facility] with built in bench  Previous level of function: Pt drives, retired  Roles and Routines: wife  Equipment Used at home: cane, straight  Assistance upon Discharge:     Pain/Comfort:  Pain Rating 1: 0/10  Pain Rating Post-Intervention 1: 0/10    Patients cultural, spiritual, Mandaen conflicts given the current situation: no    Objective:     Communicated with: GOPAL Padilla " prior to session.  Patient found supine with pulse ox (continuous), peripheral IV upon OT entry to room.    General Precautions: Standard, fall  Orthopedic Precautions: N/A  Braces: N/A  Respiratory Status: Room air     Occupational Performance:    Bed Mobility:    Patient completed Rolling/Turning to Left with  supervision  Patient completed Rolling/Turning to Right with supervision  Patient completed Scooting/Bridging with supervision  Patient completed Supine to Sit with supervision    Functional Mobility/Transfers:  Patient completed Sit <> Stand Transfer with stand by assistance  with  rolling walker   Functional Mobility: Pt engaging in functional mobility to simulate household/community distances with SBA and RW in order to maximize functional activity tolerance and standing balance required for engagement in occupations of choice.     Activities of Daily Living:  Pt completed ADLs independently this date     Cognitive/Visual Perceptual:  Cognitive/Psychosocial Skills:     -       Oriented to: AOx4   -       Follows Commands/attention:Follows multistep  commands  -       Communication: clear/fluent  -       Memory: No Deficits noted  -       Safety awareness/insight to disability: intact   -       Mood/Affect/Coping skills/emotional control: Appropriate to situation and Pleasant  Visual/Perceptual:      -Intact      Physical Exam:  Balance:    -       Impaired  Postural examination/scapula alignment:    -       Rounded shoulders  Skin integrity: Visible skin intact  Edema:  None noted  Sensation:    -       Intact  Motor Planning:    -       Intact  Dominant hand:    -       right  Upper Extremity Range of Motion:     -       Right Upper Extremity: WFL  -       Left Upper Extremity: WFL  Upper Extremity Strength:    -       Right Upper Extremity: WFL  -       Left Upper Extremity: WFL   Strength:    -       Right Upper Extremity: WFL  -       Left Upper Extremity: WFL  Fine Motor Coordination:    -        Intact  Neurological:    -       intact    AMPAC 6 Click ADL:  AMPAC Total Score: 24    Treatment & Education:  Pt educated on role of OT, POC, and goals for therapy.    POC was dicussed with patient/caregiver, who was included in its development and is in agreement with the identified goals and treatment plan.   Patient and family aware of patient's deficits and therapy progression.   Time provided for therapeutic counseling and discussion of health disposition.   Educated on importance of EOB/OOB mobility, maintaining routine, sitting up in chair, and maximizing independence with ADLs during admission   Pt completed ADLs and functional mobility for treatment session as noted above   Pt/caregiver verbalized understanding and expressed no further concerns/questions.      Patient left up in chair with all lines intact, call button in reach, and RN notified    GOALS:   Multidisciplinary Problems       Occupational Therapy Goals          Problem: Occupational Therapy    Goal Priority Disciplines Outcome Interventions   Occupational Therapy Goal     OT, PT/OT                         History:     Past Medical History:   Diagnosis Date    Anticoagulant long-term use     Asthma     Back pain     Bradycardia, unspecified 5/8/2019    The etiology of the bradycardic episode is unclear.  The have appear to be respiratory in origin (at least the 1st episode).  We will continue to monitor carefully.  We are awaiting evaluation by Cardiology.      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, with long-term current use of insulin 2020         Past Surgical History:   Procedure Laterality Date    ANTEGRADE NEPHROSTOGRAPHY Left 2019    Procedure: Nephrostogram - antegrade;  Surgeon: Robin Boyd MD;  [facility];  Service: Urology;  Laterality: Left;    BRONCHOSCOPY N/A 2019    Procedure: BRONCHOSCOPY;  Surgeon: Sean Ruano MD;  Location: [facility] OR [address] [place] [address];  Service: General;  Laterality: N/A;    CARDIAC CATHETERIZATION      CATARACT EXTRACTION      cataract extraction left eye      cataracts      CAUDAL EPIDURAL STEROID INJECTION N/A 2019    Procedure: Injection-steroid-epidural-caudal;  Surgeon: Dave Bentley Jr., MD;  Location: [facility] PAIN MGT;  Service: Pain Management;  Laterality: N/A;     SECTION, LOW TRANSVERSE      COLONOSCOPY N/A 2019    Procedure: COLONOSCOPY;  Surgeon: Al Alaniz MD;  [facility]  Service: Endoscopy;  Laterality: N/A;    CORONARY ANGIOPLASTY      CYSTOGRAM N/A 2019    Procedure: CYSTOGRAM INTRAOP;  Surgeon: Robin Boyd MD;  [facility];  Service: Urology;  Laterality: N/A;  1 HOUR    CYSTOSCOPY W/ RETROGRADES Left 2019    Procedure: CYSTOSCOPY, WITH RETROGRADE PYELOGRAM;  Surgeon: Robin Boyd MD;  [facility];  Service: Urology;  Laterality: Left;  fluro    ENDOSCOPIC ULTRASOUND OF UPPER GASTROINTESTINAL TRACT N/A 6/15/2023    Procedure: ULTRASOUND, UPPER GI TRACT, ENDOSCOPIC;  Surgeon: Lisa Kim MD;  [facility]  Service: Endoscopy;  Laterality: N/A;    ERCP N/A 6/15/2023    Procedure: ERCP (ENDOSCOPIC RETROGRADE CHOLANGIOPANCREATOGRAPHY);  Surgeon: Lisa Kim MD;  Location: [facility] ENDO (2ND FLR);  Service: Endoscopy;  Laterality: N/A;    ESOPHAGOGASTRODUODENOSCOPY W/ PEG  2019    Procedure: EGD, WITH PEG  TUBE INSERTION;  Surgeon: Sean Ruano MD;  [address]  Service: General;;    EXCISION TURBINATE, SUBMUCOUS      FLEXIBLE SIGMOIDOSCOPY N/A 5/13/2019    Procedure: SIGMOIDOSCOPY, FLEXIBLE;  Surgeon: ALBERTO Amin MD;  Location: [facility] ENDO (2ND FLR);  Service: Endoscopy;  Laterality: N/A;    FLEXIBLE SIGMOIDOSCOPY N/A 5/21/2019    Procedure: SIGMOIDOSCOPY, FLEXIBLE;  Surgeon: ALBERTO Amin MD;  Location: [facility] ENDO (2ND FLR);  Service: Endoscopy;  Laterality: N/A;    FUSION OF LUMBAR SPINE BY ANTERIOR APPROACH Left 4/12/2019    Procedure: FUSION, SPINE, LUMBAR, ANTERIOR APPROACH Left L5-S1 Anterior to Psoa Interbody Fusion, L5-S1 Posterior Instrumentation;  Surgeon: Mk George MD;  [address]  Service: Neurosurgery;  Laterality: Left;  Porcedure:  Left L5-S1 Anterior to Psoa Interbody Fusion, L5-S1 Posterior Instrumentation  Surgery Time: 4 Hrs  LOS: 2-3  Anesthesia: General   Blood: Type & Screen  R    HAND SURGERY Left     HAND SURGERY Right     torn ligament repair/ Dr. Yeboah    HYSTERECTOMY      INJECTION OF STEROID Right 12/10/2020    Procedure: INJECTION, STEROID Right SI Joint Block and Steroid Injection;  Surgeon: Mk George MD;  Location: [facility] OR;  Service: Neurosurgery;  Laterality: Right;  Procedure: Right SI Joint Block and Steroid Injection   SUrgery Time: 30 Min  LOS: 0  Anesthesia: MAC  Radiology: C-arm  Bed: [place] [id number] Poster  Position: Prone    INJECTION OF STEROID Right 9/28/2021    Procedure: INJECTION, STEROID Right SI joint block & steroid injection;  Surgeon: Mk George MD;  Location: [facility] OR;  Service: Neurosurgery;  Laterality: Right;  Procedure: Right SI joint block & steroid injection  Surgery Time: 30m  Anesthesia: Local MAC  Radiology: C-arm  Bed: Regular  Position: Prone    left foot surgery      left wrist surgery      LYSIS OF ADHESIONS N/A 2/19/2020    Procedure: LYSIS, ADHESIONS;  Surgeon: Robin Boyd MD;  [facility];  Service: Urology;  Laterality: N/A;    NASAL SEPTUM SURGERY  5/7/15    PERCUTANEOUS NEPHROSTOMY Left 4/21/2019    Procedure: Creation, Nephrostomy, Percutaneous;  Surgeon: Karina Surgeon;  Location: [facility];  Service: Anesthesiology;  Laterality: Left;    REPAIR OF URETER  4/12/2019    Procedure: REPAIR, URETER;  Surgeon: Mk George MD;  Location[address];  Service: Neurosurgery;;    REPLACEMENT OF NEPHROSTOMY TUBE N/A 7/18/2019    Procedure: REPLACEMENT, NEPHROSTOMY TUBE;  Surgeon: [facility] Diagnostic Provider;  [facility];  Service: Anesthesiology;  Laterality: N/A;  188    REPLACEMENT OF NEPHROSTOMY TUBE N/A 7/24/2019    Procedure: REPLACEMENT, NEPHROSTOMY TUBE;  Surgeon: [facility] Diagnostic Provider;  [facility];  Service: Anesthesiology;  Laterality: N/A;  188    REPLACEMENT OF NEPHROSTOMY TUBE N/A 10/7/2019    Procedure: REPLACEMENT, NEPHROSTOMY TUBE;  Surgeon: [facility] Diagnostic Provider;  [address];  Service: Anesthesiology;  Laterality: N/A;  189    REPLACEMENT OF NEPHROSTOMY TUBE N/A 11/25/2019    Procedure: REPLACEMENT, NEPHROSTOMY TUBE;  Surgeon: [facility] Diagnostic Provider;  Location: [address];  Service: Anesthesiology;  Laterality: N/A;  Room 188/Bessy    REPLACEMENT OF NEPHROSTOMY TUBE Right 2/19/2020    Procedure: REPLACEMENT, NEPHROSTOMY TUBE removal removal;  Surgeon: Robin Boyd MD;  Location: [address];  Service: Urology;  Laterality: Right;    rt elbow surgery      S/P LAD COATED STENT  05/14/2010    6 total     S/P OM1 STENT  08/2003    SINUS SURGERY      F.E.S.S.    TRACHEOSTOMY N/A 5/2/2019    Procedure: CREATION, TRACHEOSTOMY;  Surgeon: Sean Ruano MD;  Location: [address];  Service: General;  Laterality: N/A;    TUBAL LIGATION      URETERAL REIMPLANTION Left 2/19/2020    Procedure: REIMPLANTATION, URETER WITH PSOAS HITCH;  Surgeon: Robin Boyd MD;  Location: [address];  Service: Urology;   Laterality: Left;       Time Tracking:     OT Date of Treatment: 06/20/23  OT Start Time: 0926  OT Stop Time: 0942  OT Total Time (min): 16 min    Billable Minutes:Evaluation 8  Therapeutic Activity 8    6/20/2023

## 2023-06-20 NOTE — ASSESSMENT & PLAN NOTE
Mariann Huff is a 63 y.o. female PMH CAD with GINGER , HTN, HLD, DM2, MURTAZA (not on CPAP), PAD, Afib and hx of DVT on Eliquis, biliary pancreatitis admitted to [facility] on 6/9/2023 for biliary pancreatitis. Now s/p ERCP on 6/15/23 with sphincterotomy/biliary tree swept and stent placed in CBD. General Surgery consulted for evaluation for cholecystectomy.     - OK for diet per primary  - No acute surgical intervention  - Still having pain from pancreatitis  - Trend labs  - Will likely plan for cholecystectomy on Friday pending improvement in abdominal pain  - Will follow up Thursday

## 2023-06-20 NOTE — ASSESSMENT & PLAN NOTE
- hold pletal, ASA, and statin  - started on apixaban for PAD outpatient as well as a fib  - held apixaban for ERCP and again for lap adenike

## 2023-06-20 NOTE — ASSESSMENT & PLAN NOTE
- repeat renal ultrasound ordered for 6/19 with improvement  -urology does not plan any intervention currently  -check post void residual

## 2023-06-20 NOTE — PLAN OF CARE
Problem: Diabetic Ketoacidosis  Goal: Fluid and Electrolyte Balance with Absence of Ketosis  Outcome: Ongoing, Progressing     Problem: Diabetes Comorbidity  Goal: Blood Glucose Level Within Targeted Range  Outcome: Ongoing, Not Progressing     Problem: Fluid and Electrolyte Imbalance (Acute Kidney Injury/Impairment)  Goal: Fluid and Electrolyte Balance  Outcome: Ongoing, Progressing

## 2023-06-20 NOTE — PLAN OF CARE
Problem: Adult Inpatient Plan of Care  Goal: Plan of Care Review  Outcome: Ongoing, Progressing  Goal: Patient-Specific Goal (Individualized)  Outcome: Ongoing, Progressing  Goal: Absence of Hospital-Acquired Illness or Injury  Outcome: Ongoing, Progressing  Goal: Optimal Comfort and Wellbeing  Outcome: Ongoing, Progressing  Goal: Readiness for Transition of Care  Outcome: Ongoing, Progressing     Problem: Diabetic Ketoacidosis  Goal: Fluid and Electrolyte Balance with Absence of Ketosis  Outcome: Ongoing, Progressing     Problem: Diabetes Comorbidity  Goal: Blood Glucose Level Within Targeted Range  Outcome: Ongoing, Progressing     Problem: Fluid and Electrolyte Imbalance (Acute Kidney Injury/Impairment)  Goal: Fluid and Electrolyte Balance  Outcome: Ongoing, Progressing     Problem: Oral Intake Inadequate (Acute Kidney Injury/Impairment)  Goal: Optimal Nutrition Intake  Outcome: Ongoing, Progressing     Problem: Renal Function Impairment (Acute Kidney Injury/Impairment)  Goal: Effective Renal Function  Outcome: Ongoing, Progressing     Problem: Adjustment to Illness (Sepsis/Septic Shock)  Goal: Optimal Coping  Outcome: Ongoing, Progressing     Problem: Bleeding (Sepsis/Septic Shock)  Goal: Absence of Bleeding  Outcome: Ongoing, Progressing     Problem: Glycemic Control Impaired (Sepsis/Septic Shock)  Goal: Blood Glucose Level Within Desired Range  Outcome: Ongoing, Progressing     Problem: Infection Progression (Sepsis/Septic Shock)  Goal: Absence of Infection Signs and Symptoms  Outcome: Ongoing, Progressing     Problem: Nutrition Impaired (Sepsis/Septic Shock)  Goal: Optimal Nutrition Intake  Outcome: Ongoing, Progressing     Problem: Skin Injury Risk Increased  Goal: Skin Health and Integrity  Outcome: Ongoing, Progressing     Problem: Fall Injury Risk  Goal: Absence of Fall and Fall-Related Injury  Outcome: Ongoing, Progressing     Problem: Impaired Wound Healing  Goal: Optimal Wound Healing  Outcome:  Ongoing, Progressing      EARLIER IN SHIFT PT WAS NAUSEATED GAVE COMPAZINE AND IT HELPED. VITALS STABLE. NO OTHER ISSUES NOTED. MONITORING.

## 2023-06-20 NOTE — SUBJECTIVE & OBJECTIVE
Interval History: Urine output 1.2L overnight with improvement in renal function (3.3 to 2.5)    Review of patient's allergies indicates:   Allergen Reactions    Milk containing products (dairy)      Causes GI distress     Current Facility-Administered Medications   Medication Frequency    acetaminophen tablet 650 mg Q4H PRN    albuterol inhaler 2 puff Q6H PRN    amLODIPine tablet 10 mg Daily    apixaban tablet 2.5 mg BID    aspirin chewable tablet 81 mg Daily    ciprofloxacin HCl tablet 500 mg Daily    dextrose 10% bolus 125 mL 125 mL PRN    dextrose 10% bolus 250 mL 250 mL PRN    dextrose 40 % gel 15,000 mg PRN    dextrose 40 % gel 30,000 mg PRN    famotidine tablet 20 mg Daily    glucagon (human recombinant) injection 1 mg PRN    hydrALAZINE tablet 25 mg Q8H PRN    insulin aspart U-100 pen 0-5 Units PRN    insulin aspart U-100 pen 3 Units TIDWM    insulin detemir U-100 (Levemir) pen 16 Units QHS    melatonin tablet 6 mg Nightly PRN    metroNIDAZOLE tablet 500 mg Q8H    ondansetron injection 4 mg Q6H PRN    prochlorperazine injection Soln 5 mg Q6H PRN    sodium chloride 0.9% flush 10 mL PRN     Facility-Administered Medications Ordered in Other Encounters   Medication Frequency    0.9%  NaCl infusion Continuous       Objective:     Vital Signs (Most Recent):  Temp: 98.6 °F (37 °C) (06/20/23 0325)  Pulse: 68 (06/20/23 0352)  Resp: 18 (06/20/23 0325)  BP: (!) 171/61 (06/20/23 0325)  SpO2: 98 % (06/20/23 0500) Vital Signs (24h Range):  Temp:  [96.1 °F (35.6 °C)-98.6 °F (37 °C)] 98.6 °F (37 °C)  Pulse:  [68-80] 68  Resp:  [16-18] 18  SpO2:  [95 %-99 %] 98 %  BP: (166-190)/(61-86) 171/61     Weight: 62.8 kg (138 lb 7.2 oz) (06/09/23 2311)  Body mass index is 23.76 kg/m².  Body surface area is 1.68 meters squared.    I/O last 3 completed shifts:  In: 740 [P.O.:740]  Out: 1200 [Urine:1200]     Physical Exam  Constitutional:       Appearance: Normal appearance.   HENT:      Head: Normocephalic and atraumatic.       Mouth/Throat:      Mouth: Mucous membranes are moist.      Pharynx: Oropharynx is clear.   Eyes:      Extraocular Movements: Extraocular movements intact.      Pupils: Pupils are equal, round, and reactive to light.   Cardiovascular:      Rate and Rhythm: Normal rate and regular rhythm.      Pulses: Normal pulses.      Heart sounds: Normal heart sounds.   Pulmonary:      Effort: Pulmonary effort is normal. No respiratory distress.      Breath sounds: Normal breath sounds.   Abdominal:      General: Abdomen is flat. Bowel sounds are normal.      Palpations: Abdomen is soft.   Musculoskeletal:         General: No swelling.      Cervical back: Neck supple.   Skin:     General: Skin is warm and dry.      Capillary Refill: Capillary refill takes less than 2 seconds.   Neurological:      General: No focal deficit present.      Mental Status: She is alert and oriented to person, place, and time. Mental status is at baseline.   Psychiatric:         Mood and Affect: Mood normal.         Behavior: Behavior normal.         Thought Content: Thought content normal.         Judgment: Judgment normal.        Significant Labs:  All labs within the past 24 hours have been reviewed.     Significant Imaging:  All imaging within the past 24 hours has been reviewed.

## 2023-06-20 NOTE — SUBJECTIVE & OBJECTIVE
This follow-up encounter was provided through telemedicine to address  ATN (acute tubular necrosis) present on admission.  Patient was transferred to the telemedicine service on:  06/20/2023   The patient location is: 8097/8097 A admitted 6/9/2023  2:20 PM.      Interval History/Overnight Events:   Clinical record since admit reviewed.    Patient is able to provide adequate history.    Patient denies abd pain and says she is tolerating food without nausea today; Cr improving; discussed plan of care with Gen Surg regarding surgery and holding anticoagulation; also discussed plan for care with endocrinology    Review of Systems   Constitutional:  Positive for fatigue. Negative for fever.   Respiratory:  Negative for shortness of breath.    Cardiovascular:  Negative for chest pain.   Gastrointestinal:  Negative for abdominal pain, diarrhea and nausea.        I have reviewed the following on 06/20/2023:     Details     [x]   Lab results  WBC elevated at 12.8; Hgb stable; k repleted as low at 3; Cr improved to 2.5; AST/ALT unchanged from 6/19; alk phos elevated to 893; bilirubin stagnant    []   Micro reports     []   Pathology reports     []   Imaging reports     []   Cardiology Procedure reports     []   Outside records/CareEverywhere     []  Independently viewed:         Inpatient Medications reviewed and prescribed for management of current problems:  Scheduled Meds:   amLODIPine  10 mg Oral Daily    aspirin  81 mg Oral Daily    ciprofloxacin HCl  500 mg Oral Daily    famotidine  20 mg Oral Daily    insulin aspart U-100  5 Units Subcutaneous TIDWM    insulin detemir U-100  16 Units Subcutaneous QHS    metroNIDAZOLE  500 mg Oral Q8H     Continuous Infusions:  PRN Meds:.acetaminophen, albuterol, dextrose 10%, dextrose 10%, dextrose, dextrose, glucagon (human recombinant), hydrALAZINE, insulin aspart U-100, melatonin, ondansetron, prochlorperazine, sodium chloride 0.9%      Objective:     Temp:  [96.1 °F (35.6  °C)-98.6 °F (37 °C)] 98 °F (36.7 °C)  Pulse:  [68-80] 79  Resp:  [16-18] 18  SpO2:  [95 %-99 %] 98 %  BP: (164-190)/(61-86) 164/75      Intake/Output Summary (Last 24 hours) at 6/20/2023 1431  Last data filed at 6/20/2023 0617  Gross per 24 hour   Intake 740 ml   Output --   Net 740 ml        Body mass index is 23.76 kg/m².    Physical Exam  Vitals and nursing note reviewed.   Constitutional:       General: She is not in acute distress.     Appearance: Normal appearance.   HENT:      Head: Normocephalic and atraumatic.   Eyes:      Extraocular Movements: Extraocular movements intact.   Cardiovascular:      Rate and Rhythm: Normal rate.   Pulmonary:      Effort: Pulmonary effort is normal. No tachypnea or respiratory distress.   Neurological:      General: No focal deficit present.      Mental Status: She is alert and oriented to person, place, and time.      Cranial Nerves: No cranial nerve deficit.      Motor: No weakness.   Psychiatric:         Attention and Perception: Attention normal.         Mood and Affect: Mood and affect normal.         Speech: Speech normal.         Behavior: Behavior is cooperative.        Labs: All labs within the last 24 hours were reviewed.   Recent Results (from the past 24 hour(s))   POCT glucose    Collection Time: 06/19/23  3:36 PM   Result Value Ref Range    POCT Glucose 243 (H) 70 - 110 mg/dL   POCT glucose    Collection Time: 06/19/23  9:15 PM   Result Value Ref Range    POCT Glucose 193 (H) 70 - 110 mg/dL   Phosphorus    Collection Time: 06/20/23  5:07 AM   Result Value Ref Range    Phosphorus 2.5 (L) 2.7 - 4.5 mg/dL   CBC auto differential    Collection Time: 06/20/23  5:07 AM   Result Value Ref Range    WBC 12.83 (H) 3.90 - 12.70 K/uL    RBC 3.96 (L) 4.00 - 5.40 M/uL    Hemoglobin 10.7 (L) 12.0 - 16.0 g/dL    Hematocrit 32.9 (L) 37.0 - 48.5 %    MCV 83 82 - 98 fL    MCH 27.0 27.0 - 31.0 pg    MCHC 32.5 32.0 - 36.0 g/dL    RDW 15.1 (H) 11.5 - 14.5 %    Platelets 342 150 - 450  K/uL    MPV 10.4 9.2 - 12.9 fL    Immature Granulocytes 2.8 (H) 0.0 - 0.5 %    Gran # (ANC) 8.7 (H) 1.8 - 7.7 K/uL    Immature Grans (Abs) 0.36 (H) 0.00 - 0.04 K/uL    Lymph # 1.8 1.0 - 4.8 K/uL    Mono # 1.4 (H) 0.3 - 1.0 K/uL    Eos # 0.5 0.0 - 0.5 K/uL    Baso # 0.07 0.00 - 0.20 K/uL    nRBC 0 0 /100 WBC    Gran % 68.0 38.0 - 73.0 %    Lymph % 13.8 (L) 18.0 - 48.0 %    Mono % 10.8 4.0 - 15.0 %    Eosinophil % 4.1 0.0 - 8.0 %    Basophil % 0.5 0.0 - 1.9 %    Platelet Estimate Appears normal     Aniso Slight     Poly Occasional     Differential Method Automated    Magnesium    Collection Time: 06/20/23  5:07 AM   Result Value Ref Range    Magnesium 2.0 1.6 - 2.6 mg/dL   Comprehensive metabolic panel    Collection Time: 06/20/23  5:07 AM   Result Value Ref Range    Sodium 142 136 - 145 mmol/L    Potassium 3.0 (L) 3.5 - 5.1 mmol/L    Chloride 106 95 - 110 mmol/L    CO2 24 23 - 29 mmol/L    Glucose 149 (H) 70 - 110 mg/dL    BUN 37 (H) 8 - 23 mg/dL    Creatinine 2.5 (H) 0.5 - 1.4 mg/dL    Calcium 9.9 8.7 - 10.5 mg/dL    Total Protein 7.5 6.0 - 8.4 g/dL    Albumin 1.9 (L) 3.5 - 5.2 g/dL    Total Bilirubin 1.9 (H) 0.1 - 1.0 mg/dL    Alkaline Phosphatase 893 (H) 55 - 135 U/L    AST 81 (H) 10 - 40 U/L    ALT 83 (H) 10 - 44 U/L    eGFR 21.2 (A) >60 mL/min/1.73 m^2    Anion Gap 12 8 - 16 mmol/L   POCT glucose    Collection Time: 06/20/23  8:10 AM   Result Value Ref Range    POCT Glucose 166 (H) 70 - 110 mg/dL   POCT glucose    Collection Time: 06/20/23 11:32 AM   Result Value Ref Range    POCT Glucose 203 (H) 70 - 110 mg/dL        Lab Results   Component Value Date    [id number] Not Detected 09/25/2021       Recent Labs   Lab 06/18/23  0247 06/19/23  0411 06/20/23  0507   WBC 9.87 9.95 12.83*   LYMPH 10.7*  1.1 12.5*  1.2 13.8*  1.8   HGB 9.2* 9.6* 10.7*   HCT 29.3* 30.7* 32.9*    280 342     Recent Labs   Lab 06/18/23 0247 06/19/23  0411 06/20/23  0507    139 142   K 3.4* 3.2* 3.0*    108 106   CO2  19* 20* 24   BUN 43* 44* 37*   CREATININE 3.8* 3.3* 2.5*   * 154* 149*   CALCIUM 9.3 9.3 9.9   MG 2.6 2.2 2.0   PHOS 2.5* 2.6* 2.5*     Recent Labs   Lab 06/18/23  0247 06/19/23  0411 06/20/23  0507   ALKPHOS 1,008* 869* 893*   * 88* 83*   * 80* 81*   ALBUMIN 1.5* 1.6* 1.9*   PROT 6.0 6.4 7.5   BILITOT 2.9* 1.9* 1.9*        No results for input(s): DDIMER, FERRITIN, CRP, LDH, BNP, TROPONINI, CPK in the last 72 hours.    Invalid input(s): PROCALCITONIN        Microbiology: All microbiology updates for the past 24 hours have been reviewed.  Microbiology Results (last 7 days)       Procedure Component Value Units Date/Time    Blood culture [[id number]] Collected: 06/13/23 0814    Order Status: Completed Specimen: Blood Updated: 06/18/23 1012     Blood Culture, Routine No growth after 5 days.    Narrative:      Lft hand    Blood culture [[id number]] Collected: 06/13/23 0815    Order Status: Completed Specimen: Blood Updated: 06/18/23 1012     Blood Culture, Routine No growth after 5 days.    Narrative:      Lft forearm              Imaging All imaging within the last 24 hours was reviewed.   ECG Results    None         Results for orders placed during the hospital encounter of 05/25/23    Echo    Interpretation Summary  · The estimated ejection fraction is 55%.  · The quantitatively derived ejection fraction is 52%.  · Normal right ventricular size with normal right ventricular systolic function.  · Normal left ventricular diastolic function.  · The left ventricle is normal in size with normal systolic function.  · Normal central venous pressure (3 mmHg).      US Kidney  Narrative: EXAMINATION:  US KIDNEY    CLINICAL HISTORY:  Silvino, hydronephrosis;    TECHNIQUE:  Ultrasound of the kidneys was performed including color flow and Doppler evaluation of the kidneys.    COMPARISON:  Ultrasound retroperitoneal 06/16/2023    FINDINGS:  Right kidney: The right kidney measures 12.2 cm. No cortical thinning. No  loss of corticomedullary distinction. Resistive index measures 0.86.  Upper pole 0.8 x 11.0 x 0.7 cm simple cyst, previously 0.8 x 0.8 x 1.0 cm.  No mass. No renal stone. No hydronephrosis.    Left kidney: The left kidney measures 12.1 cm. No cortical thinning. No loss of corticomedullary distinction. Resistive index measures 0.86.  Upper pole 1.9 x 2.3 x 2.0 cm simple cyst, previously 2.2 x 1.9 x 12.4 cm.  Lower pole 2.0 x 2.2 x 3.2 cm minimally complex cyst with 2 mm septation, previously 2.4 x 3.1 x 2.8 cm.  No mass. No renal stone. Mild hydronephrosis, improved from prior.    Splenic resistive index is 0.83.  Impression: Mild left hydronephrosis, improved compared to prior exam.    Elevated arterial resistive indices which may represent medical renal disease.    Bilateral simple and left minimally complex renal cysts.    Electronically signed by resident: Christian Peres  Date:    06/19/2023  Time:    21:36    Electronically signed by: Tristan Santiago  Date:    06/20/2023  Time:    08:51

## 2023-06-20 NOTE — ASSESSMENT & PLAN NOTE
"Lipase >2000 on presentation, downtrending without any identifiable cause for elevation. Patient without any abdominal pain. Discontinue HCTZ as it could potentially be cause of pancreatitis as well  - US abdomen with gallbladder sludge and "prominence" of the pancreatic duct   - liver enzymes, alk-phos, bilirubin elevated on 06/11, suggestive of possible choledocholithiasis as cause of pancreatitis.  MRCP ordered  - MRCP on my personal interpretation without any focal obstruction or retained stones, however liver enzymes continue to elevate  - AES was consulted for findings and after discussion, recommended ERCP/EUS once off apixaban and pletal which was completed on 6/15 - biliary sludging found, CBD dilated.   - General surgery assessed for cholecystectomy - planned for 6/22/2023 with apixaban held  - resume zosyn due to elevate WBC and alk phos; bland diet      "

## 2023-06-20 NOTE — ASSESSMENT & PLAN NOTE
Patient with acute kidney injury likely due to acute tubular necrosis PAPA is currently improving. Labs reviewed- Renal function/electrolytes with Estimated Creatinine Clearance: 20.1 mL/min (A) (based on SCr of 2.5 mg/dL (H)). according to latest data. Monitor urine output and serial BMP and adjust therapy as needed. Avoid nephrotoxins and renally dose meds for GFR listed above.

## 2023-06-20 NOTE — PROGRESS NOTES
Pharmacist Renal Dose Adjustment Note    Mariann Huff is a 63 y.o. female being treated with the medication Zosyn    Patient Data:    Vital Signs (Most Recent):  Temp: 98 °F (36.7 °C) (06/20/23 1132)  Pulse: 79 (06/20/23 1132)  Resp: 18 (06/20/23 1132)  BP: (!) 164/75 (06/20/23 1132)  SpO2: 98 % (06/20/23 1430) Vital Signs (72h Range):  Temp:  [96.1 °F (35.6 °C)-98.8 °F (37.1 °C)]   Pulse:  [66-80]   Resp:  [16-18]   BP: (113-190)/(58-86)   SpO2:  [92 %-100 %]      Recent Labs   Lab 06/18/23  0247 06/19/23  0411 06/20/23  0507   CREATININE 3.8* 3.3* 2.5*     Serum creatinine: 2.5 mg/dL (H) 06/20/23 0507  Estimated creatinine clearance: 20.1 mL/min (A)    Medication:Zosyn dose: 4.5g frequency q8h will be changed to  4.5g frequency q12h    Pharmacist's Name: Yariel Lorenz  Pharmacist's Extension: [phone]

## 2023-06-20 NOTE — SUBJECTIVE & OBJECTIVE
Past Medical History:   Diagnosis Date    Anticoagulant long-term use     Asthma     Back pain     Bradycardia, unspecified 2019    The etiology of the bradycardic episode is unclear.  The have appear to be respiratory in origin (at least the 1st episode).  We will continue to monitor carefully.  We are awaiting evaluation by Cardiology.      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, with long-term current use of insulin 2020       Past Surgical History:   Procedure Laterality Date    ANTEGRADE NEPHROSTOGRAPHY Left 2019    Procedure: Nephrostogram - antegrade;  Surgeon: Robin Boyd MD;  Location: [facility] OR [address] [place] [address];  Service: Urology;  Laterality: Left;    BRONCHOSCOPY N/A 2019    Procedure: BRONCHOSCOPY;  Surgeon: Sean Ruano MD;  Location: [facility] OR [address] [place] [address];  Service: General;  Laterality: N/A;    CARDIAC CATHETERIZATION      CATARACT EXTRACTION      cataract extraction left eye      cataracts      CAUDAL EPIDURAL STEROID INJECTION N/A 2019    Procedure: Injection-steroid-epidural-caudal;  Surgeon: Dave Bentley Jr., MD;  Location: [facility] PAIN MGT;  Service: Pain Management;  Laterality: N/A;     SECTION, LOW TRANSVERSE      COLONOSCOPY N/A 2019    Procedure: COLONOSCOPY;  Surgeon: Al Alaniz MD;  [facility]  Service: Endoscopy;  Laterality: N/A;    CORONARY ANGIOPLASTY      CYSTOGRAM N/A  12/11/2019    Procedure: CYSTOGRAM INTRAOP;  Surgeon: Robin Boyd MD;  Location: [facility] OR 2ND FLR;  Service: Urology;  Laterality: N/A;  1 HOUR    CYSTOSCOPY W/ RETROGRADES Left 12/11/2019    Procedure: CYSTOSCOPY, WITH RETROGRADE PYELOGRAM;  Surgeon: Robin Boyd MD;  [facility];  Service: Urology;  Laterality: Left;  fluro    ENDOSCOPIC ULTRASOUND OF UPPER GASTROINTESTINAL TRACT N/A 6/15/2023    Procedure: ULTRASOUND, UPPER GI TRACT, ENDOSCOPIC;  Surgeon: Lisa Kim MD;  Location: [facility] ENDO (2ND FLR);  Service: Endoscopy;  Laterality: N/A;    ERCP N/A 6/15/2023    Procedure: ERCP (ENDOSCOPIC RETROGRADE CHOLANGIOPANCREATOGRAPHY);  Surgeon: Lisa Kim MD;  Location: [facility] ENDO (2ND FLR);  Service: Endoscopy;  Laterality: N/A;    ESOPHAGOGASTRODUODENOSCOPY W/ PEG  5/2/2019    Procedure: EGD, WITH PEG TUBE INSERTION;  Surgeon: Sean Ruano MD;  [facility];  Service: General;;    EXCISION TURBINATE, SUBMUCOUS      FLEXIBLE SIGMOIDOSCOPY N/A 5/13/2019    Procedure: SIGMOIDOSCOPY, FLEXIBLE;  Surgeon: ALBERTO Amin MD;  Location: [facility] ENDO (2ND FLR);  Service: Endoscopy;  Laterality: N/A;    FLEXIBLE SIGMOIDOSCOPY N/A 5/21/2019    Procedure: SIGMOIDOSCOPY, FLEXIBLE;  Surgeon: ALBERTO Amin MD;  Location: [facility] ENDO (2ND FLR);  Service: Endoscopy;  Laterality: N/A;    FUSION OF LUMBAR SPINE BY ANTERIOR APPROACH Left 4/12/2019    Procedure: FUSION, SPINE, LUMBAR, ANTERIOR APPROACH Left L5-S1 Anterior to Psoa Interbody Fusion, L5-S1 Posterior Instrumentation;  Surgeon: Mk George MD;  [facility];  Service: Neurosurgery;  Laterality: Left;  Porcedure:  Left L5-S1 Anterior to Psoa Interbody Fusion, L5-S1 Posterior Instrumentation  Surgery Time: 4 Hrs  LOS: 2-3  Anesthesia: General   Blood: Type & Screen  R    HAND SURGERY Left     HAND SURGERY Right     torn ligament repair/ Dr. Yeboah    HYSTERECTOMY      INJECTION OF STEROID Right 12/10/2020     Procedure: INJECTION, STEROID Right SI Joint Block and Steroid Injection;  Surgeon: Mk George MD;  Location: [facility] OR;  Service: Neurosurgery;  Laterality: Right;  Procedure: Right SI Joint Block and Steroid Injection   SUrgery Time: 30 Min  LOS: 0  Anesthesia: MAC  Radiology: C-arm  Bed: Tomer 4 Poster  Position: Prone    INJECTION OF STEROID Right 9/28/2021    Procedure: INJECTION, STEROID Right SI joint block & steroid injection;  Surgeon: Mk George MD;  Location: [facility] OR;  Service: Neurosurgery;  Laterality: Right;  Procedure: Right SI joint block & steroid injection  Surgery Time: 30m  Anesthesia: Local MAC  Radiology: C-arm  Bed: Regular  Position: Prone    left foot surgery      left wrist surgery      LYSIS OF ADHESIONS N/A 2/19/2020    Procedure: LYSIS, ADHESIONS;  Surgeon: Robin Boyd MD;  Location: [facility] OR [address] [place] [address];  Service: Urology;  Laterality: N/A;    NASAL SEPTUM SURGERY  5/7/15    PERCUTANEOUS NEPHROSTOMY Left 4/21/2019    Procedure: Creation, Nephrostomy, Percutaneous;  Surgeon: Karina Surgeon;  [facility];  Service: Anesthesiology;  Laterality: Left;    REPAIR OF URETER  4/12/2019    Procedure: REPAIR, URETER;  Surgeon: Mk George MD;  [facility];  Service: Neurosurgery;;    REPLACEMENT OF NEPHROSTOMY TUBE N/A 7/18/2019    Procedure: REPLACEMENT, NEPHROSTOMY TUBE;  Surgeon: [facility] Diagnostic Provider;  [address]  Service: Anesthesiology;  Laterality: N/A;  188    REPLACEMENT OF NEPHROSTOMY TUBE N/A 7/24/2019    Procedure: REPLACEMENT, NEPHROSTOMY TUBE;  Surgeon: [facility] Diagnostic Provider;  [address]  Service: Anesthesiology;  Laterality: N/A;  188    REPLACEMENT OF NEPHROSTOMY TUBE N/A 10/7/2019    Procedure: REPLACEMENT, NEPHROSTOMY TUBE;  Surgeon: [facility] Diagnostic Provider;  [address]  Service: Anesthesiology;  Laterality: N/A;  189    REPLACEMENT OF NEPHROSTOMY TUBE N/A 11/25/2019    Procedure:  REPLACEMENT, NEPHROSTOMY TUBE;  Surgeon: [place] Diagnostic Provider;  Location: [facility] OR 2ND FLR;  Service: Anesthesiology;  Laterality: N/A;  Room 188/Bessy    REPLACEMENT OF NEPHROSTOMY TUBE Right 2/19/2020    Procedure: REPLACEMENT, NEPHROSTOMY TUBE removal removal;  Surgeon: Robin Boyd MD;  Location: [facility] OR 2ND FLR;  Service: Urology;  Laterality: Right;    rt elbow surgery      S/P LAD COATED STENT  05/14/2010    6 total     S/P OM1 STENT  08/2003    SINUS SURGERY      F.E.S.S.    TRACHEOSTOMY N/A 5/2/2019    Procedure: CREATION, TRACHEOSTOMY;  Surgeon: Sean Ruano MD;  [facility];  Service: General;  Laterality: N/A;    TUBAL LIGATION      URETERAL REIMPLANTION Left 2/19/2020    Procedure: REIMPLANTATION, URETER WITH PSOAS HITCH;  Surgeon: Robin Boyd MD;  [facility];  Service: Urology;  Laterality: Left;       Review of patient's allergies indicates:   Allergen Reactions    Milk containing products (dairy)      Causes GI distress       Family History       Problem Relation (Age of Onset)    Diabetes Mother, Father, Sister, Brother, Sister    Heart attack Father    Heart disease Mother    Leukemia Father    No Known Problems Sister, Brother, Brother, Maternal Grandmother, Maternal Grandfather, Paternal Grandmother, Paternal Grandfather, Son, Son, Maternal Aunt, Maternal Uncle, Paternal Aunt, Paternal Uncle            Tobacco Use    Smoking status: Never    Smokeless tobacco: Never   Substance and Sexual Activity    Alcohol use: No     Alcohol/week: 0.0 standard drinks    Drug use: No    Sexual activity: Yes     Partners: Male     Birth control/protection: Post-menopausal     Comment:        Review of Systems   Constitutional:  Negative for chills and fever.   Gastrointestinal:  Positive for nausea and vomiting. Negative for abdominal pain.   Genitourinary:  Negative for decreased urine volume, dysuria, flank pain and hematuria.     Objective:     Temp:  [96.1 °F  (35.6 °C)-98.6 °F (37 °C)] 98 °F (36.7 °C)  Pulse:  [68-80] 79  Resp:  [16-18] 18  SpO2:  [95 %-99 %] 97 %  BP: (164-190)/(61-86) 164/75  Weight: 62.8 kg (138 lb 7.2 oz)  Body mass index is 23.76 kg/m².      Bladder Scan Volume (mL): 500 mL (06/16/23 1338)  Post Void Cath Residual Output (mL): 400 mL (06/16/23 1338)    Drains       None                    Physical Exam  Constitutional:       Appearance: She is not toxic-appearing.   HENT:      Head: Normocephalic.   Cardiovascular:      Rate and Rhythm: Normal rate.   Pulmonary:      Effort: Pulmonary effort is normal. No respiratory distress.   Abdominal:      General: Abdomen is flat. There is no distension.      Palpations: Abdomen is soft.      Tenderness: There is no abdominal tenderness.   Musculoskeletal:         General: No swelling or deformity. Normal range of motion.      Cervical back: Normal range of motion.   Skin:     General: Skin is warm and dry.      Findings: No erythema.   Neurological:      General: No focal deficit present.      Mental Status: She is alert.      Motor: No weakness.   Psychiatric:         Mood and Affect: Mood normal.         Behavior: Behavior normal.        Significant Labs:    BMP:  Recent Labs   Lab 06/18/23 0247 06/19/23 0411 06/20/23  0507    139 142   K 3.4* 3.2* 3.0*    108 106   CO2 19* 20* 24   BUN 43* 44* 37*   CREATININE 3.8* 3.3* 2.5*   CALCIUM 9.3 9.3 9.9       CBC:  Recent Labs   Lab 06/18/23 0247 06/19/23  0411 06/20/23  0507   WBC 9.87 9.95 12.83*   HGB 9.2* 9.6* 10.7*   HCT 29.3* 30.7* 32.9*    280 342       All pertinent labs results from the past 24 hours have been reviewed.    Significant Imaging:  All pertinent imaging results/findings from the past 24 hours have been reviewed.

## 2023-06-20 NOTE — ASSESSMENT & PLAN NOTE
Patient with baseline creatine 1.0-1.3, on admission 1.7 and worsened to peak 3.8 on 6/19. Now improving    Suspect PAPA likely ischemic ATN in s/o hemodynamic instability 2/2 sepsis due to intra-abdominal source    6/13 UA with trace protein, 4+ glu, 2+ ketones, trace blood, 1+ bili, few bacteria  Urine microscopy preformed in nephro lab on 6/16/23 with many granular casts, consistent with ATN  RP US with mild to moderate left hydronephrosis and distended bladder on 6/16, bedside ultrasound 6/19 with similar finding    Recommendations:  -- Continue daily or qshift bladder scans and consider correa catheter if volume >300ml on bladder scan    -- Strict intake and output  -- Maintain hemodynamic stability  -- Avoid nephrotoxins  -- Renally dose medications

## 2023-06-20 NOTE — PROGRESS NOTES
[facility] - Telemetry Stepdown  General Surgery  Progress Note     Subjective:     History of Present Illness:  No notes on file    Post-Op Info:  Procedure(s) (LRB):  ULTRASOUND, UPPER GI TRACT, ENDOSCOPIC (N/A)  ERCP (ENDOSCOPIC RETROGRADE CHOLANGIOPANCREATOGRAPHY) (N/A)   5 Days Post-Op     Interval History: Some nausea and vomiting overnight. Continued pain in the epigastric region. WBC up to 12.     Medications:  Continuous Infusions:  Scheduled Meds:   amLODIPine  10 mg Oral Daily    aspirin  81 mg Oral Daily    famotidine  20 mg Oral Daily    insulin aspart U-100  5 Units Subcutaneous TIDWM    insulin detemir U-100  16 Units Subcutaneous QHS    piperacillin-tazobactam (Zosyn) IV (PEDS and ADULTS) (extended infusion is not appropriate)  4.5 g Intravenous Q12H     PRN Meds:acetaminophen, albuterol, dextrose 10%, dextrose 10%, dextrose, dextrose, glucagon (human recombinant), hydrALAZINE, insulin aspart U-100, melatonin, ondansetron, prochlorperazine, sodium chloride 0.9%     Review of patient's allergies indicates:   Allergen Reactions    Milk containing products (dairy)      Causes GI distress     Objective:     Vital Signs (Most Recent):  Temp: 98.4 °F (36.9 °C) (06/20/23 1551)  Pulse: 82 (06/20/23 1551)  Resp: 18 (06/20/23 1551)  BP: (!) 151/73 (06/20/23 1551)  SpO2: 98 % (06/20/23 1551) Vital Signs (24h Range):  Temp:  [97.1 °F (36.2 °C)-98.6 °F (37 °C)] 98.4 °F (36.9 °C)  Pulse:  [68-82] 82  Resp:  [16-18] 18  SpO2:  [95 %-99 %] 98 %  BP: (151-190)/(61-85) 151/73     Weight: 62.8 kg (138 lb 7.2 oz)  Body mass index is 23.76 kg/m².    Intake/Output - Last 3 Shifts         06/18 0700  06/19 0659 06/19 0700  06/20 0659 06/20 0700  06/21 0659    P.O. 480 740     Total Intake(mL/kg) 480 (7.6) 740 (11.8)     Urine (mL/kg/hr) 1200 (0.8)      Stool       Total Output 1200      Net -720 +740            Urine Occurrence 1 x 5 x              Physical Exam  Vitals and nursing note reviewed.   Constitutional:        General: She is not in acute distress.     Appearance: Normal appearance.   HENT:      Head: Normocephalic and atraumatic.   Eyes:      Extraocular Movements: Extraocular movements intact.   Cardiovascular:      Rate and Rhythm: Normal rate.   Pulmonary:      Effort: Pulmonary effort is normal. No tachypnea or respiratory distress.   Abdominal:      General: There is distension.      Tenderness: There is abdominal tenderness.   Neurological:      General: No focal deficit present.      Mental Status: She is alert and oriented to person, place, and time.      Cranial Nerves: No cranial nerve deficit.      Motor: No weakness.   Psychiatric:         Attention and Perception: Attention normal.         Mood and Affect: Mood and affect normal.         Speech: Speech normal.         Behavior: Behavior is cooperative.        Significant Labs:  I have reviewed all pertinent lab results within the past 24 hours.  CBC:   Recent Labs   Lab 06/20/23  0507   WBC 12.83*   RBC 3.96*   HGB 10.7*   HCT 32.9*      MCV 83   MCH 27.0   MCHC 32.5     CMP:   Recent Labs   Lab 06/20/23  0507   *   CALCIUM 9.9   ALBUMIN 1.9*   PROT 7.5      K 3.0*   CO2 24      BUN 37*   CREATININE 2.5*   ALKPHOS 893*   ALT 83*   AST 81*   BILITOT 1.9*       Significant Diagnostics:  I have reviewed all pertinent imaging results/findings within the past 24 hours.    Assessment/Plan:     Acute recurrent pancreatitis  Elizaamanda Huff is a 63 y.o. female PMH CAD with GINGER , HTN, HLD, DM2, MURTAZA (not on CPAP), PAD, Afib and hx of DVT on Eliquis, biliary pancreatitis admitted to [facility] on 6/9/2023 for biliary pancreatitis. Now s/p ERCP on 6/15/23 with sphincterotomy/biliary tree swept and stent placed in CBD. General Surgery consulted for evaluation for cholecystectomy.     - OK for diet per primary  - No acute surgical intervention  - Still having pain from pancreatitis  - Trend labs  - Will likely plan for cholecystectomy on Friday  pending improvement in abdominal pain  - Will follow up Thursday        Gabino Colby MD  General Surgery  [facility] - Telemetry Stepdown

## 2023-06-20 NOTE — PLAN OF CARE
POC established and functional mobility goals were created to help pt return to PLOF. Will be reassessed as appropriate to measure pt progress.    Problem: Physical Therapy  Goal: Physical Therapy Goal  Description: Goals to be met by: 23     Patient will increase functional independence with mobility by performin. Sit to stand transfer with [place]  3. Bed to chair transfer with Modified [place] using LRAD as needed  4. Gait  x 300 feet with Modified [place] using LRAD as needed.   5. Lower extremity exercise program x15 reps per handout, with assistance as needed    Outcome: Ongoing, Progressing

## 2023-06-20 NOTE — CONSULTS
[facility] - Telemetry Stepdown  Urology  Consult Note    Patient Name: Mariann Huff  MRN: [id number]  Admission Date: 6/9/2023  Hospital Length of Stay: 11   Code Status: Full Code   Attending Provider: Sharon Dial MD   Consulting Provider: Cee Hannah MD  Primary Care Physician: Jasbir Haney MD  Principal Problem:ATN (acute tubular necrosis)    Inpatient consult to Urology  Consult performed by: Cee Hannah MD  Consult ordered by: Sharon Dial MD  Reason for consult: L hydro, PAPA           Subjective:     HPI:  63F with complex urologic history. She initially underwent L5-S1 OLIF with neurosurgery on 4/12/19 and had an intraoperative left ureteral injury with ureteroureteral anastomosis and ureteral stent placement. She did well initially but re-presented shortly thereafter and her left ureteral anastomosis had been found to be completely broken down. She was then managed with ligation of left ureter, and a left neph tube that was placed on 4/21/19.   She eventually underwent a L ureteral reimplant with Boari flap on 2/19/20.   Post-operative she has had stable L hydro from her refluxing anastomosis.      She was admitted on 6/9 for progressive nausea/vomiting in the setting of a recent episode of pancreatitis.  She then became septic with SBPs in 70s.  She then developed an PAPA on 6/14 to 2.0 (baseline 1.0-1.3) but Cr has been downtrending for the past two days.      On assessment, she is AFVSS.  Denies fever, chills, flank pain, hematuria, difficulty emptying.  Cr 2.5. WBC 12.8.  Renal ultrasound from 6/19 shows mild L hydro, stable from prior US 10/2022.  Also notable for distended bladder.   CTAP from 5/25 with mild L hydro as well as a 1.3 cm R renal mass, stable from August 2022.   Urine cx growing low colony count enterococcus.  Urine with granular casts.     No recent Is/Os or bladder scans recorded.      Past Medical History:   Diagnosis Date    Anticoagulant long-term use     Asthma     Back pain      Bradycardia, unspecified 2019    The etiology of the bradycardic episode is unclear.  The have appear to be respiratory in origin (at least the 1st episode).  We will continue to monitor carefully.  We are awaiting evaluation by Cardiology.      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, with long-term current use of insulin 2020       Past Surgical History:   Procedure Laterality Date    ANTEGRADE NEPHROSTOGRAPHY Left 2019    Procedure: Nephrostogram - antegrade;  Surgeon: Robin Boyd MD;  Location: [address];  Service: Urology;  Laterality: Left;    BRONCHOSCOPY N/A 2019    Procedure: BRONCHOSCOPY;  Surgeon: Sean Ruano MD;  Location: [address];  Service: General;  Laterality: N/A;    CARDIAC CATHETERIZATION      CATARACT EXTRACTION      cataract extraction left eye      cataracts      CAUDAL EPIDURAL STEROID INJECTION N/A 2019    Procedure: Injection-steroid-epidural-caudal;  Surgeon: Dave Bentley Jr., MD;  Location: [facility] PAIN MGT;  Service: Pain Management;  Laterality: N/A;     SECTION, LOW TRANSVERSE      COLONOSCOPY N/A 2019    Procedure: COLONOSCOPY;  Surgeon: Al Alaniz MD;  Location: [facility] ENDO ([address] [place] [address]);  Service: Endoscopy;  Laterality: N/A;    CORONARY ANGIOPLASTY      CYSTOGRAM N/A 2019    Procedure: CYSTOGRAM INTRAOP;  Surgeon: Robin Boyd MD;  Location: [address]  FLR;  Service: Urology;  Laterality: N/A;  1 HOUR    CYSTOSCOPY W/ RETROGRADES Left 12/11/2019    Procedure: CYSTOSCOPY, WITH RETROGRADE PYELOGRAM;  Surgeon: Robin Boyd MD;  Location: [facility] OR 2ND FLR;  Service: Urology;  Laterality: Left;  fluro    ENDOSCOPIC ULTRASOUND OF UPPER GASTROINTESTINAL TRACT N/A 6/15/2023    Procedure: ULTRASOUND, UPPER GI TRACT, ENDOSCOPIC;  Surgeon: Lisa Kim MD;  Location: [facility] ENDO (2ND FLR);  Service: Endoscopy;  Laterality: N/A;    ERCP N/A 6/15/2023    Procedure: ERCP (ENDOSCOPIC RETROGRADE CHOLANGIOPANCREATOGRAPHY);  Surgeon: Lisa Kim MD;  Location: [facility] ENDO (2ND FLR);  Service: Endoscopy;  Laterality: N/A;    ESOPHAGOGASTRODUODENOSCOPY W/ PEG  5/2/2019    Procedure: EGD, WITH PEG TUBE INSERTION;  Surgeon: Sean Ruano MD;  [facility];  Service: General;;    EXCISION TURBINATE, SUBMUCOUS      FLEXIBLE SIGMOIDOSCOPY N/A 5/13/2019    Procedure: SIGMOIDOSCOPY, FLEXIBLE;  Surgeon: ALBERTO Amin MD;  Location: [facility] ENDO (2ND FLR);  Service: Endoscopy;  Laterality: N/A;    FLEXIBLE SIGMOIDOSCOPY N/A 5/21/2019    Procedure: SIGMOIDOSCOPY, FLEXIBLE;  Surgeon: ALBERTO Amin MD;  Location: [facility] ENDO (2ND FLR);  Service: Endoscopy;  Laterality: N/A;    FUSION OF LUMBAR SPINE BY ANTERIOR APPROACH Left 4/12/2019    Procedure: FUSION, SPINE, LUMBAR, ANTERIOR APPROACH Left L5-S1 Anterior to Psoa Interbody Fusion, L5-S1 Posterior Instrumentation;  Surgeon: Mk George MD;  [facility];  Service: Neurosurgery;  Laterality: Left;  Porcedure:  Left L5-S1 Anterior to Psoa Interbody Fusion, L5-S1 Posterior Instrumentation  Surgery Time: 4 Hrs  LOS: 2-3  Anesthesia: General   Blood: Type & Screen  R    HAND SURGERY Left     HAND SURGERY Right     torn ligament repair/ Dr. Yeboah    HYSTERECTOMY      INJECTION OF STEROID Right 12/10/2020    Procedure: INJECTION, STEROID Right SI Joint Block and Steroid Injection;  Surgeon: Mk George,  MD;  Location: [facility];  Service: Neurosurgery;  Laterality: Right;  Procedure: Right SI Joint Block and Steroid Injection   SUrgery Time: 30 Min  LOS: 0  Anesthesia: MAC  Radiology: C-arm  Bed: Tomer 4 Poster  Position: Prone    INJECTION OF STEROID Right 9/28/2021    Procedure: INJECTION, STEROID Right SI joint block & steroid injection;  Surgeon: Mk George MD;  Location: [facility];  Service: Neurosurgery;  Laterality: Right;  Procedure: Right SI joint block & steroid injection  Surgery Time: 30m  Anesthesia: Local MAC  Radiology: C-arm  Bed: Regular  Position: Prone    left foot surgery      left wrist surgery      LYSIS OF ADHESIONS N/A 2/19/2020    Procedure: LYSIS, ADHESIONS;  Surgeon: Robin Boyd MD;  Location: [address];  Service: Urology;  Laterality: N/A;    NASAL SEPTUM SURGERY  5/7/15    PERCUTANEOUS NEPHROSTOMY Left 4/21/2019    Procedure: Creation, Nephrostomy, Percutaneous;  Surgeon: Karina Surgeon;  Location: [facility];  Service: Anesthesiology;  Laterality: Left;    REPAIR OF URETER  4/12/2019    Procedure: REPAIR, URETER;  Surgeon: Mk George MD;  Location: [address];  Service: Neurosurgery;;    REPLACEMENT OF NEPHROSTOMY TUBE N/A 7/18/2019    Procedure: REPLACEMENT, NEPHROSTOMY TUBE;  Surgeon: [facility] Diagnostic Provider;  [address]  Service: Anesthesiology;  Laterality: N/A;  188    REPLACEMENT OF NEPHROSTOMY TUBE N/A 7/24/2019    Procedure: REPLACEMENT, NEPHROSTOMY TUBE;  Surgeon: [facility] Diagnostic Provider;  [address]  Service: Anesthesiology;  Laterality: N/A;  188    REPLACEMENT OF NEPHROSTOMY TUBE N/A 10/7/2019    Procedure: REPLACEMENT, NEPHROSTOMY TUBE;  Surgeon: [facility] Diagnostic Provider;  [address]  Service: Anesthesiology;  Laterality: N/A;  189    REPLACEMENT OF NEPHROSTOMY TUBE N/A 11/25/2019    Procedure: REPLACEMENT, NEPHROSTOMY TUBE;  Surgeon: [facility] Diagnostic Provider;  [facility];  Service:  Anesthesiology;  Laterality: N/A;  Room 188/Bessy    REPLACEMENT OF NEPHROSTOMY TUBE Right 2/19/2020    Procedure: REPLACEMENT, NEPHROSTOMY TUBE removal removal;  Surgeon: Robin Boyd MD;  [facility];  Service: Urology;  Laterality: Right;    rt elbow surgery      S/P LAD COATED STENT  05/14/2010    6 total     S/P OM1 STENT  08/2003    SINUS SURGERY      F.E.S.S.    TRACHEOSTOMY N/A 5/2/2019    Procedure: CREATION, TRACHEOSTOMY;  Surgeon: Sean Ruano MD;  [facility];  Service: General;  Laterality: N/A;    TUBAL LIGATION      URETERAL REIMPLANTION Left 2/19/2020    Procedure: REIMPLANTATION, URETER WITH PSOAS HITCH;  Surgeon: Robin Boyd MD;  [facility];  Service: Urology;  Laterality: Left;       Review of patient's allergies indicates:   Allergen Reactions    Milk containing products (dairy)      Causes GI distress       Family History       Problem Relation (Age of Onset)    Diabetes Mother, Father, Sister, Brother, Sister    Heart attack Father    Heart disease Mother    Leukemia Father    No Known Problems Sister, Brother, Brother, Maternal Grandmother, Maternal Grandfather, Paternal Grandmother, Paternal Grandfather, Son, Son, Maternal Aunt, Maternal Uncle, Paternal Aunt, Paternal Uncle            Tobacco Use    Smoking status: Never    Smokeless tobacco: Never   Substance and Sexual Activity    Alcohol use: No     Alcohol/week: 0.0 standard drinks    Drug use: No    Sexual activity: Yes     Partners: Male     Birth control/protection: Post-menopausal     Comment:        Review of Systems   Constitutional:  Negative for chills and fever.   Gastrointestinal:  Positive for nausea and vomiting. Negative for abdominal pain.   Genitourinary:  Negative for decreased urine volume, dysuria, flank pain and hematuria.     Objective:     Temp:  [96.1 °F (35.6 °C)-98.6 °F (37 °C)] 98 °F (36.7 °C)  Pulse:  [68-80] 79  Resp:  [16-18] 18  SpO2:  [95 %-99 %] 97  %  BP: (164-190)/(61-86) 164/75  Weight: 62.8 kg (138 lb 7.2 oz)  Body mass index is 23.76 kg/m².      Bladder Scan Volume (mL): 500 mL (06/16/23 1338)  Post Void Cath Residual Output (mL): 400 mL (06/16/23 1338)    Drains       None                    Physical Exam  Constitutional:       Appearance: She is not toxic-appearing.   HENT:      Head: Normocephalic.   Cardiovascular:      Rate and Rhythm: Normal rate.   Pulmonary:      Effort: Pulmonary effort is normal. No respiratory distress.   Abdominal:      General: Abdomen is flat. There is no distension.      Palpations: Abdomen is soft.      Tenderness: There is no abdominal tenderness.   Musculoskeletal:         General: No swelling or deformity. Normal range of motion.      Cervical back: Normal range of motion.   Skin:     General: Skin is warm and dry.      Findings: No erythema.   Neurological:      General: No focal deficit present.      Mental Status: She is alert.      Motor: No weakness.   Psychiatric:         Mood and Affect: Mood normal.         Behavior: Behavior normal.        Significant Labs:    BMP:  Recent Labs   Lab 06/18/23  0247 06/19/23  0411 06/20/23  0507    139 142   K 3.4* 3.2* 3.0*    108 106   CO2 19* 20* 24   BUN 43* 44* 37*   CREATININE 3.8* 3.3* 2.5*   CALCIUM 9.3 9.3 9.9       CBC:  Recent Labs   Lab 06/18/23  0247 06/19/23  0411 06/20/23  0507   WBC 9.87 9.95 12.83*   HGB 9.2* 9.6* 10.7*   HCT 29.3* 30.7* 32.9*    280 342       All pertinent labs results from the past 24 hours have been reviewed.    Significant Imaging:  All pertinent imaging results/findings from the past 24 hours have been reviewed.                      Assessment and Plan:     Hydronephrosis  63F with complex urologic history as noted above.  - mild L hydro stable since L ureteral reimplant with Boari flap in February 2020  - PAPA likely secondary to ATN in the setting of sepsis   - would obtain PVR bladder scan given bladder distention on  recent renal US   - strict Is/Os   - trend Cr   - rest of care per primary         VTE Risk Mitigation (From admission, onward)      None            Thank you for your consult.     Cee Hannah MD  Urology  [facility] - Telemetry Stepdown    I have reviewed and concur with the resident's history, physical, assessment, and plan.  I have personally interviewed and examined the patient at bedside.  See below addendum for my evaluation and additional findings.  Serum Cr trending donward.  No  intervention at this time warranted.  Stable hydronephrosis.  Patient to f/u with Dr. Boyd as an outpatient.

## 2023-06-20 NOTE — PROGRESS NOTES
Jose Frey - Telemetry [facility] Medicine  Telemedicine Progress Note    Patient Name: Mariann Huff  MRN: [id number]  Patient Class: IP- Inpatient   Admission Date: 6/9/2023  Length of Stay: 11 days  Attending Physician: Sharon Dial MD  Primary Care Provider: Jasbir Haney MD          Subjective:     Principal Problem:ATN (acute tubular necrosis)        HPI:  63F with PMH of CAD with GINGER , HTN, HLD, DM2, MURTAZA (not on CPAP), PAD, Afib and hx of DVT on Eliquis and recent admission for pancreatitis wo presents to the ED with nausea and vomiting. Patient recently admitted 05/25 to 05/26 for first episode of acute pancreatitis. Lipase >2000 at that time. CT abdomen and US without evidence of biliary obstruction or pancreatic inflammation. Lipid panel wnl. Patient pain and n/v improved with treatment for acute pancreatitis felt secondary to home med Trulicity. She was discharged with new insulin regimen which she reports she never picked up from pharmacy so has been taking farxiga only at home. States that she has had progressively worsening n/v and po intolerance. States that she is not having severe pain similar to recent pancreatitis. Denies fevers, chills, chest pain, shortness of breath.     In the ED patient afebrile and hemodynamically stable saturating well on room air. Lipase >2000. BG >700 and serum osmo 326 after 1L IVFs. B-hydroxybutyrate 1.3. pH 7.4 and bicarb 34. AG 12. Patient started on aggressive IVFs and insulin gtt for management on pancreatitis and HHS. Admitted to the care of medicine for further evaluation and management.       Overview/Hospital Course:  Patient transitioned from insulin drip to subcutaneous insulin on 06/10.  On 06/11, patient developed new hyperbilirubinemia and worsening liver enzymes and given history of pancreatitis concern for possible choledocholithiasis.  MRCP ordered did not demonstrate any focal obstruction showever liver enzymes and alk phos increasing in  size. In speaking with AES, patient to be off apixaban and pletal prior to ERCP.        This follow-up encounter was provided through telemedicine to address  ATN (acute tubular necrosis) present on admission.  Patient was transferred to the telemedicine service on:  06/20/2023   The patient location is: [facility]/80 A admitted 6/9/2023  2:20 PM.      Interval History/Overnight Events:   Clinical record since admit reviewed.    Patient is able to provide adequate history.    Patient denies abd pain and says she is tolerating food without nausea today; Cr improving; discussed plan of care with Gen Surg regarding surgery and holding anticoagulation; also discussed plan for care with endocrinology    Review of Systems   Constitutional:  Positive for fatigue. Negative for fever.   Respiratory:  Negative for shortness of breath.    Cardiovascular:  Negative for chest pain.   Gastrointestinal:  Negative for abdominal pain, diarrhea and nausea.        I have reviewed the following on 06/20/2023:     Details     [x]   Lab results  WBC elevated at 12.8; Hgb stable; k repleted as low at 3; Cr improved to 2.5; AST/ALT unchanged from 6/19; alk phos elevated to 893; bilirubin stagnant    []   Micro reports     []   Pathology reports     []   Imaging reports     []   Cardiology Procedure reports     []   Outside records/CareEverywhere     []  Independently viewed:         Inpatient Medications reviewed and prescribed for management of current problems:  Scheduled Meds:   amLODIPine  10 mg Oral Daily    aspirin  81 mg Oral Daily    ciprofloxacin HCl  500 mg Oral Daily    famotidine  20 mg Oral Daily    insulin aspart U-100  5 Units Subcutaneous TIDWM    insulin detemir U-100  16 Units Subcutaneous QHS    metroNIDAZOLE  500 mg Oral Q8H     Continuous Infusions:  PRN Meds:.acetaminophen, albuterol, dextrose 10%, dextrose 10%, dextrose, dextrose, glucagon (human recombinant), hydrALAZINE, insulin aspart U-100, melatonin,  ondansetron, prochlorperazine, sodium chloride 0.9%      Objective:     Temp:  [96.1 °F (35.6 °C)-98.6 °F (37 °C)] 98 °F (36.7 °C)  Pulse:  [68-80] 79  Resp:  [16-18] 18  SpO2:  [95 %-99 %] 98 %  BP: (164-190)/(61-86) 164/75      Intake/Output Summary (Last 24 hours) at 6/20/2023 1431  Last data filed at 6/20/2023 0617  Gross per 24 hour   Intake 740 ml   Output --   Net 740 ml        Body mass index is 23.76 kg/m².    Physical Exam  Vitals and nursing note reviewed.   Constitutional:       General: She is not in acute distress.     Appearance: Normal appearance.   HENT:      Head: Normocephalic and atraumatic.   Eyes:      Extraocular Movements: Extraocular movements intact.   Cardiovascular:      Rate and Rhythm: Normal rate.   Pulmonary:      Effort: Pulmonary effort is normal. No tachypnea or respiratory distress.   Neurological:      General: No focal deficit present.      Mental Status: She is alert and oriented to person, place, and time.      Cranial Nerves: No cranial nerve deficit.      Motor: No weakness.   Psychiatric:         Attention and Perception: Attention normal.         Mood and Affect: Mood and affect normal.         Speech: Speech normal.         Behavior: Behavior is cooperative.        Labs: All labs within the last 24 hours were reviewed.   Recent Results (from the past 24 hour(s))   POCT glucose    Collection Time: 06/19/23  3:36 PM   Result Value Ref Range    POCT Glucose 243 (H) 70 - 110 mg/dL   POCT glucose    Collection Time: 06/19/23  9:15 PM   Result Value Ref Range    POCT Glucose 193 (H) 70 - 110 mg/dL   Phosphorus    Collection Time: 06/20/23  5:07 AM   Result Value Ref Range    Phosphorus 2.5 (L) 2.7 - 4.5 mg/dL   CBC auto differential    Collection Time: 06/20/23  5:07 AM   Result Value Ref Range    WBC 12.83 (H) 3.90 - 12.70 K/uL    RBC 3.96 (L) 4.00 - 5.40 M/uL    Hemoglobin 10.7 (L) 12.0 - 16.0 g/dL    Hematocrit 32.9 (L) 37.0 - 48.5 %    MCV 83 82 - 98 fL    MCH 27.0 27.0 -  31.0 pg    MCHC 32.5 32.0 - 36.0 g/dL    RDW 15.1 (H) 11.5 - 14.5 %    Platelets 342 150 - 450 K/uL    MPV 10.4 9.2 - 12.9 fL    Immature Granulocytes 2.8 (H) 0.0 - 0.5 %    Gran # (ANC) 8.7 (H) 1.8 - 7.7 K/uL    Immature Grans (Abs) 0.36 (H) 0.00 - 0.04 K/uL    Lymph # 1.8 1.0 - 4.8 K/uL    Mono # 1.4 (H) 0.3 - 1.0 K/uL    Eos # 0.5 0.0 - 0.5 K/uL    Baso # 0.07 0.00 - 0.20 K/uL    nRBC 0 0 /100 WBC    Gran % 68.0 38.0 - 73.0 %    Lymph % 13.8 (L) 18.0 - 48.0 %    Mono % 10.8 4.0 - 15.0 %    Eosinophil % 4.1 0.0 - 8.0 %    Basophil % 0.5 0.0 - 1.9 %    Platelet Estimate Appears normal     Aniso Slight     Poly Occasional     Differential Method Automated    Magnesium    Collection Time: 06/20/23  5:07 AM   Result Value Ref Range    Magnesium 2.0 1.6 - 2.6 mg/dL   Comprehensive metabolic panel    Collection Time: 06/20/23  5:07 AM   Result Value Ref Range    Sodium 142 136 - 145 mmol/L    Potassium 3.0 (L) 3.5 - 5.1 mmol/L    Chloride 106 95 - 110 mmol/L    CO2 24 23 - 29 mmol/L    Glucose 149 (H) 70 - 110 mg/dL    BUN 37 (H) 8 - 23 mg/dL    Creatinine 2.5 (H) 0.5 - 1.4 mg/dL    Calcium 9.9 8.7 - 10.5 mg/dL    Total Protein 7.5 6.0 - 8.4 g/dL    Albumin 1.9 (L) 3.5 - 5.2 g/dL    Total Bilirubin 1.9 (H) 0.1 - 1.0 mg/dL    Alkaline Phosphatase 893 (H) 55 - 135 U/L    AST 81 (H) 10 - 40 U/L    ALT 83 (H) 10 - 44 U/L    eGFR 21.2 (A) >60 mL/min/1.73 m^2    Anion Gap 12 8 - 16 mmol/L   POCT glucose    Collection Time: 06/20/23  8:10 AM   Result Value Ref Range    POCT Glucose 166 (H) 70 - 110 mg/dL   POCT glucose    Collection Time: 06/20/23 11:32 AM   Result Value Ref Range    POCT Glucose 203 (H) 70 - 110 mg/dL        Lab Results   Component Value Date    [id number] Not Detected 09/25/2021       Recent Labs   Lab 06/18/23  0247 06/19/23  0411 06/20/23  0507   WBC 9.87 9.95 12.83*   LYMPH 10.7*  1.1 12.5*  1.2 13.8*  1.8   HGB 9.2* 9.6* 10.7*   HCT 29.3* 30.7* 32.9*    280 342     Recent Labs   Lab  06/18/23  0247 06/19/23  0411 06/20/23  0507    139 142   K 3.4* 3.2* 3.0*    108 106   CO2 19* 20* 24   BUN 43* 44* 37*   CREATININE 3.8* 3.3* 2.5*   * 154* 149*   CALCIUM 9.3 9.3 9.9   MG 2.6 2.2 2.0   PHOS 2.5* 2.6* 2.5*     Recent Labs   Lab 06/18/23  0247 06/19/23  0411 06/20/23  0507   ALKPHOS 1,008* 869* 893*   * 88* 83*   * 80* 81*   ALBUMIN 1.5* 1.6* 1.9*   PROT 6.0 6.4 7.5   BILITOT 2.9* 1.9* 1.9*        No results for input(s): DDIMER, FERRITIN, CRP, LDH, BNP, TROPONINI, CPK in the last 72 hours.    Invalid input(s): PROCALCITONIN        Microbiology: All microbiology updates for the past 24 hours have been reviewed.  Microbiology Results (last 7 days)       Procedure Component Value Units Date/Time    Blood culture [[id number]] Collected: 06/13/23 0814    Order Status: Completed Specimen: Blood Updated: 06/18/23 1012     Blood Culture, Routine No growth after 5 days.    Narrative:      Lft hand    Blood culture [[id number]] Collected: 06/13/23 0815    Order Status: Completed Specimen: Blood Updated: 06/18/23 1012     Blood Culture, Routine No growth after 5 days.    Narrative:      Lft forearm              Imaging All imaging within the last 24 hours was reviewed.   ECG Results    None         Results for orders placed during the hospital encounter of 05/25/23    Echo    Interpretation Summary  · The estimated ejection fraction is 55%.  · The quantitatively derived ejection fraction is 52%.  · Normal right ventricular size with normal right ventricular systolic function.  · Normal left ventricular diastolic function.  · The left ventricle is normal in size with normal systolic function.  · Normal central venous pressure (3 mmHg).      US Kidney  Narrative: EXAMINATION:  US KIDNEY    CLINICAL HISTORY:  Silvino, hydronephrosis;    TECHNIQUE:  Ultrasound of the kidneys was performed including color flow and Doppler evaluation of the kidneys.    COMPARISON:  Ultrasound  retroperitoneal 06/16/2023    FINDINGS:  Right kidney: The right kidney measures 12.2 cm. No cortical thinning. No loss of corticomedullary distinction. Resistive index measures 0.86.  Upper pole 0.8 x 11.0 x 0.7 cm simple cyst, previously 0.8 x 0.8 x 1.0 cm.  No mass. No renal stone. No hydronephrosis.    Left kidney: The left kidney measures 12.1 cm. No cortical thinning. No loss of corticomedullary distinction. Resistive index measures 0.86.  Upper pole 1.9 x 2.3 x 2.0 cm simple cyst, previously 2.2 x 1.9 x 12.4 cm.  Lower pole 2.0 x 2.2 x 3.2 cm minimally complex cyst with 2 mm septation, previously 2.4 x 3.1 x 2.8 cm.  No mass. No renal stone. Mild hydronephrosis, improved from prior.    Splenic resistive index is 0.83.  Impression: Mild left hydronephrosis, improved compared to prior exam.    Elevated arterial resistive indices which may represent medical renal disease.    Bilateral simple and left minimally complex renal cysts.    Electronically signed by resident: Christian Peres  Date:    06/19/2023  Time:    21:36    Electronically signed by: Tristan Santiago  Date:    06/20/2023  Time:    08:51             Assessment/Plan:      * ATN (acute tubular necrosis)  Patient with acute kidney injury likely due to acute tubular necrosis PAPA is currently improving. Labs reviewed- Renal function/electrolytes with Estimated Creatinine Clearance: 20.1 mL/min (A) (based on SCr of 2.5 mg/dL (H)). according to latest data. Monitor urine output and serial BMP and adjust therapy as needed. Avoid nephrotoxins and renally dose meds for GFR listed above.       Hypokalemia  -initially hyperkalemia at admit but developed hypokalemia as renal function improved  -very judicious repletion of potassium due to history of hyperkalemia      Hyperbilirubinemia  - AES completed ERCP with intervention       History of pancreatitis  See acute pancreatitis      History of DVT (deep vein thrombosis)  - UE/LE DVT   -hold apixaban in anticipation  "of lap adenike; bridge with IV heparin vs lovenox based on renal fxn      Type 2 diabetes mellitus with chronic kidney disease  IP HHS/DKA Pathway initiated. On presentation BG >700 and serum osmo 326 after 1L IVFs. B-hydroxybutyrate 1.3. pH 7.4 and bicarb 34. AG 12. Patient was started on insulin drip on admission but later in the day, patient transitioned to Levemir 14 units at night and aspart 3 units with meals.  Started on diabetic diet  - patient currently on Levemir 20 units nightly, 10 units aspart with meals  - further insulin titration per endocrinology  - POC AC/HS      Acute recurrent pancreatitis  Lipase >2000 on presentation, downtrending without any identifiable cause for elevation. Patient without any abdominal pain. Discontinue HCTZ as it could potentially be cause of pancreatitis as well  - US abdomen with gallbladder sludge and "prominence" of the pancreatic duct   - liver enzymes, alk-phos, bilirubin elevated on 06/11, suggestive of possible choledocholithiasis as cause of pancreatitis.  MRCP ordered  - MRCP on my personal interpretation without any focal obstruction or retained stones, however liver enzymes continue to elevate  - AES was consulted for findings and after discussion, recommended ERCP/EUS once off apixaban and pletal which was completed on 6/15 - biliary sludging found, CBD dilated.   - General surgery assessed for cholecystectomy - planned for 6/22/2023 with apixaban held  - resume zosyn due to elevate WBC and alk phos; bland diet        PAD (peripheral artery disease)  - hold pletal, ASA, and statin  - started on apixaban for PAD outpatient as well as a fib  - held apixaban for ERCP and again for lap adenike    Hydronephrosis  - repeat renal ultrasound ordered for 6/19 with improvement  -urology does not plan any intervention currently  -check post void residual      Asthma  - albuterol prn  - stable on room air        Acute encephalopathy  Perhaps related to sepsis.  Patient on 6/14 " a.m., increasingly lethargic.  Per , last night she had jerking movements when attempting to go to the restroom with assistance.  On exam this morning she had asterixis and required sternal rubbing to awaken her.  - Ammonia was ordered and was normal for the past 2 days, patient has not had a bowel movement in over a week per the patient's .  We will give lactulose enema regardless of ammonia level  - VBG ordered, personal interpretation reflects no evidence of hypercapnic respiratory failure  - blood cultures were collected yesterday x2, no growth today  - escalated patient's antibiotics from ceftriaxone and metronidazole to Zosyn given her increasing white blood cell count      PAPA (acute kidney injury)  Creatinine 1.7 on presentation ; baseline 1.0  Estimated Creatinine Clearance: 20.1 mL/min (A) (based on SCr of 2.5 mg/dL (H)).  - IVFs stopped, recurrent PAPA on 6/12 with FeNA indication pre-renal disease. 1L LR bolus ordered over 3 hrs  - worsening renal function in setting of hypotension on 6/15 - ATN felt present by Nephology in conjunction with some urinary retention issues.   - repeating renal ultrasound 6/19 to assess if improved urination has improved size left hydronephrosis - improved; consulted and discussed plan of care with urology who recommend checking postvoid residual  - Nephrology team consulted and have signed off now that function is improving  -encourage adequate po hydration      Hyperlipidemia associated with type 2 diabetes mellitus  Holding statin    Hypertension associated with diabetes  Restarting home amlodipine on 6/19  -will continue to monitor and adjust regimen as necessary      VTE Risk Mitigation (From admission, onward)    None          The amount of time spent during, and associated with, this encounter was 50 min; the nature of the activities performed were:  Preparing to see the patient; chart review, Obtaining and/or receiving separately obtained history ,  Performing medically appropriate examination and/or evaluation, Counseling and educating the patient/family/caregiver, Communicating results to the patient/family/caregiver, Ordering medications, tests, or procedures, Referring to and communicating with other health care professionals, Documenting clinical information in the EHR, Independently interpreting results and Care coordination      REBECCA: 6/21/2023     Code Status: Full Code   Is the patient medically ready for discharge?: No    Reason for patient still in hospital (select all that apply): Patient trending condition, Laboratory test, Treatment, Consult recommendations and Pending disposition  Discharge Plan A: Home with family   Discharge Delays: None known at this time      I have completed this tele-visit with the assistance of a telepresenter.    The attending portion of this evaluation, treatment, and documentation was performed per Sharon Dial MD via Telemedicine AudioVisual using the secure [employer] software platform with 2 way audio/video. The provider was located off-site and the patient is located in the hospital. The aforementioned video software was utilized to document the relevant history and physical exam    Sharon Dial MD  Department of Hospital Medicine   [facility] - Telemetry Stepdown

## 2023-06-20 NOTE — SUBJECTIVE & OBJECTIVE
"Interval HPI:   Overnight events: BG 140s-200s last 24hr. Appetite is better and overall states she is feeling better. D/c plan TBD.  Eatin%  Nausea: No  Hypoglycemia and intervention: No  Fever: No  TPN and/or TF: No  If yes, type of TF/TPN and rate: n/a    BP (!) 164/75 (BP Location: Right arm, Patient Position: Sitting)   Pulse 79   Temp 98 °F (36.7 °C) (Oral)   Resp 18   Ht 5' 4" (1.626 m)   Wt 62.8 kg (138 lb 7.2 oz)   LMP  (LMP Unknown)   SpO2 97%   Breastfeeding No   BMI 23.76 kg/m²     Labs Reviewed and Include    Recent Labs   Lab 23  0507   *   CALCIUM 9.9   ALBUMIN 1.9*   PROT 7.5      K 3.0*   CO2 24      BUN 37*   CREATININE 2.5*   ALKPHOS 893*   ALT 83*   AST 81*   BILITOT 1.9*     Lab Results   Component Value Date    WBC 12.83 (H) 2023    HGB 10.7 (L) 2023    HCT 32.9 (L) 2023    MCV 83 2023     2023     No results for input(s): TSH, FREET4 in the last 168 hours.  Lab Results   Component Value Date    HGBA1C 8.1 (H) 2023       Nutritional status:   Body mass index is 23.76 kg/m².  Lab Results   Component Value Date    ALBUMIN 1.9 (L) 2023    ALBUMIN 1.6 (L) 2023    ALBUMIN 1.5 (L) 2023     No results found for: PREALBUMIN    Estimated Creatinine Clearance: 20.1 mL/min (A) (based on SCr of 2.5 mg/dL (H)).    Accu-Checks  Recent Labs     23  0814 23  1222 23  1715 23  2121 23  0741 23  1159 23  1536 23  2115 23  0810 23  1132   POCTGLUCOSE 145* 222* 215* 210* 146* 252* 243* 193* 166* 203*       Current Medications and/or Treatments Impacting Glycemic Control  Immunotherapy:    Immunosuppressants       None          Steroids:   Hormones (From admission, onward)      Start     Stop Route Frequency Ordered    23  melatonin tablet 6 mg         -- Oral Nightly PRN 23          Pressors:    Autonomic Drugs (From admission, " onward)      None          Hyperglycemia/Diabetes Medications:   Antihyperglycemics (From admission, onward)      Start     Stop Route Frequency Ordered    06/20/23 1645  insulin aspart U-100 pen 5 Units         -- SubQ 3 times daily with meals 06/20/23 1341    06/17/23 2100  insulin detemir U-100 (Levemir) pen 16 Units         -- SubQ Nightly 06/17/23 0807    06/10/23 1315  insulin regular in 0.9 % NaCl 100 unit/100 mL (1 unit/mL) infusion        Question:  Enter initial dose (Units/hr):  Answer:  0.6    06/10 2200 IV Continuous 06/10/23 1211    06/10/23 1309  insulin aspart U-100 pen 0-5 Units         -- SubQ As needed (PRN) 06/10/23 1211

## 2023-06-20 NOTE — ASSESSMENT & PLAN NOTE
Creatinine 1.7 on presentation ; baseline 1.0  Estimated Creatinine Clearance: 20.1 mL/min (A) (based on SCr of 2.5 mg/dL (H)).  - IVFs stopped, recurrent PAPA on 6/12 with FeNA indication pre-renal disease. 1L LR bolus ordered over 3 hrs  - worsening renal function in setting of hypotension on 6/15 - ATN felt present by Nephology in conjunction with some urinary retention issues.   - repeating renal ultrasound 6/19 to assess if improved urination has improved size left hydronephrosis - improved; consulted and discussed plan of care with urology who recommend checking postvoid residual  - Nephrology team consulted and have signed off now that function is improving  -encourage adequate po hydration

## 2023-06-20 NOTE — ASSESSMENT & PLAN NOTE
- HHS on admission. Recent pancreatitis admission, not yet back to normal po intake with worsening hepatic function. Taking Glipizide and Farxiga at home. Never picked up Levemir Rx recently prescribed by her PCP    - 24 hour glucose trend: 100s-low 200s    - Continue Levemir 16 units at night  -  Novolog 3 units before meals, continue low dose correction Novolog prn  - Accuchecks ac/hs    - If blood glucose greater than 300, please ask patient not to eat food or drink anything other than water until correctional insulin has brought it back below 250    **Please notify endocrine of change in diet or plans for NPO for surgery (or any other reason) as this may change her insulin requirement**

## 2023-06-20 NOTE — HPI
63F with complex urologic history. She initially underwent L5-S1 OLIF with neurosurgery on 4/12/19 and had an intraoperative left ureteral injury with ureteroureteral anastomosis and ureteral stent placement. She did well initially but re-presented shortly thereafter and her left ureteral anastomosis had been found to be completely broken down. She was then managed with ligation of left ureter, and a left neph tube that was placed on 4/21/19.   She eventually underwent a L ureteral reimplant with Boari flap on 2/19/20.   Post-operative she has had stable L hydro from her refluxing anastomosis.      She was admitted on 6/9 for progressive nausea/vomiting in the setting of a recent episode of pancreatitis.  She then became septic with SBPs in 70s.  She then developed an PAPA on 6/14 to 2.0 (baseline 1.0-1.3) but Cr has been downtrending for the past two days.      On assessment, she is AFVSS.  Denies fever, chills, flank pain, hematuria, difficulty emptying.  Cr 2.5. WBC 12.8.  Renal ultrasound from 6/19 shows mild L hydro, stable from prior US 10/2022.  Also notable for distended bladder.   CTAP from 5/25 with mild L hydro as well as a 1.3 cm R renal mass, stable from August 2022.   Urine cx growing low colony count enterococcus.  Urine with granular casts.     No recent Is/Os or bladder scans recorded.

## 2023-06-20 NOTE — ASSESSMENT & PLAN NOTE
63F with complex urologic history as noted above.  - mild L hydro stable since L ureteral reimplant with Boari flap in February 2020  - PAPA likely secondary to ATN in the setting of sepsis   - would obtain PVR bladder scan given bladder distention on recent renal US   - strict Is/Os   - trend Cr   - rest of care per primary    Monae Singh MD  PGY 1 Department of Internal Medicine        Patient is a 47y old  Male who presents with a chief complaint of Shortness of breath, Chest and Back Pain (2022 07:52)      SUBJECTIVE / OVERNIGHT EVENTS: Pt seen and examined. No acute overnight events. Denies fevers, chills, CP, SOB, Abdominal pain, N/V, Constipation, Diarrhea        MEDICATIONS  (STANDING):  acetaminophen     Tablet .. 1000 milliGRAM(s) Oral every 6 hours  albuterol/ipratropium for Nebulization 3 milliLiter(s) Nebulizer every 6 hours  aspirin enteric coated 81 milliGRAM(s) Oral daily  benzonatate 200 milliGRAM(s) Oral every 8 hours  budesonide 160 MICROgram(s)/formoterol 4.5 MICROgram(s) Inhaler 2 Puff(s) Inhalation two times a day  colchicine 0.6 milliGRAM(s) Oral two times a day  cyclobenzaprine 5 milliGRAM(s) Oral at bedtime  dextrose 5%. 1000 milliLiter(s) (50 mL/Hr) IV Continuous <Continuous>  dextrose 5%. 1000 milliLiter(s) (100 mL/Hr) IV Continuous <Continuous>  dextrose 50% Injectable 25 Gram(s) IV Push once  dextrose 50% Injectable 12.5 Gram(s) IV Push once  dextrose 50% Injectable 25 Gram(s) IV Push once  enoxaparin Injectable 40 milliGRAM(s) SubCutaneous every 24 hours  furosemide   Injectable 60 milliGRAM(s) IV Push daily  glucagon  Injectable 1 milliGRAM(s) IntraMuscular once  hydroxychloroquine 400 milliGRAM(s) Oral daily  insulin lispro (ADMELOG) corrective regimen sliding scale   SubCutaneous three times a day before meals  insulin lispro (ADMELOG) corrective regimen sliding scale   SubCutaneous at bedtime  levothyroxine 125 MICROGram(s) Oral daily  lidocaine   4% Patch 2 Patch Transdermal daily  losartan 50 milliGRAM(s) Oral daily  methylPREDNISolone sodium succinate Injectable 40 milliGRAM(s) IV Push daily  pantoprazole    Tablet 40 milliGRAM(s) Oral before breakfast  polyethylene glycol 3350 17 Gram(s) Oral daily  potassium chloride    Tablet ER 40 milliEquivalent(s) Oral once  senna 1 Tablet(s) Oral daily  trimethoprim  160 mG/sulfamethoxazole 800 mG 1 Tablet(s) Oral <User Schedule>    MEDICATIONS  (PRN):  ALBUTerol    0.083% 2.5 milliGRAM(s) Nebulizer every 3 hours PRN Shortness of Breath and/or Wheezing  dextrose Oral Gel 15 Gram(s) Oral once PRN Blood Glucose LESS THAN 70 milliGRAM(s)/deciliter  hydrocodone/homatropine Syrup 10 milliLiter(s) Oral every 4 hours PRN Cough  HYDROmorphone  Injectable 1 milliGRAM(s) IV Push every 3 hours PRN Severe Pain (7 - 10)      I&O's Summary    2022 07:01  -  2022 07:00  --------------------------------------------------------  IN: 200 mL / OUT: 300 mL / NET: -100 mL        Vital Signs Last 24 Hrs  T(C): 36.5 (2022 23:31), Max: 36.9 (2022 17:39)  T(F): 97.7 (2022 23:31), Max: 98.5 (2022 17:39)  HR: 109 (2022 23:31) (100 - 114)  BP: 144/97 (2022 23:31) (131/90 - 144/97)  BP(mean): --  RR: 18 (2022 23:31) (18 - 25)  SpO2: 96% (2022 23:31) (96% - 97%)    Parameters below as of 2022 23:31  Patient On (Oxygen Delivery Method): room air        CAPILLARY BLOOD GLUCOSE      POCT Blood Glucose.: 149 mg/dL (2022 21:06)  POCT Blood Glucose.: 228 mg/dL (2022 16:37)  POCT Blood Glucose.: 248 mg/dL (2022 12:24)  POCT Blood Glucose.: 222 mg/dL (2022 08:22)  POCT Blood Glucose.: 179 mg/dL (2022 08:16)      PHYSICAL EXAM:  GENERAL: NAD,   HEAD:  Atraumatic, Normocephalic  EYES: EOMI, PERRL, conjunctiva and sclera clear  NECK: No JVD  CHEST/LUNG: Clear to auscultation bilaterally; No wheeze  HEART: Regular rate and rhythm; No murmurs, rubs, or gallops  ABDOMEN: Soft, Nontender, Nondistended; Bowel sounds present  EXTREMITIES:  2+ Peripheral Pulses, No clubbing, cyanosis, or edema  PSYCH: AAOx3  NEUROLOGY: non-focal  SKIN: No rashes or lesions       LABS:                        13.0   10.55 )-----------( 325      ( 2022 06:48 )             38.1     Auto Eosinophil # x     / Auto Eosinophil % x     / Auto Neutrophil # x     / Auto Neutrophil % x     / BANDS % x                            12.8   9.74  )-----------( 323      ( 2022 06:52 )             37.4     Auto Eosinophil # x     / Auto Eosinophil % x     / Auto Neutrophil # x     / Auto Neutrophil % x     / BANDS % x                            14.3   8.91  )-----------( 327      ( 2022 08:58 )             43.0     Auto Eosinophil # x     / Auto Eosinophil % x     / Auto Neutrophil # x     / Auto Neutrophil % x     / BANDS % x            135  |  94<L>  |  12  ----------------------------<  155<H>  3.1<L>   |  25  |  0.89      138  |  96  |  11  ----------------------------<  160<H>  3.4<L>   |  27  |  0.92      136  |  95<L>  |  10  ----------------------------<  144<H>  3.4<L>   |  27  |  1.09    Ca    9.8      2022 06:50  Mg     2.1       Phos  3.1       TPro  6.8  /  Alb  4.1  /  TBili  0.3  /  DBili  x   /  AST  95<H>  /  ALT  159<H>  /  AlkPhos  118    TPro  6.7  /  Alb  4.1  /  TBili  0.3  /  DBili  x   /  AST  43<H>  /  ALT  99<H>  /  AlkPhos  112  07-23          Urinalysis Basic - ( 2022 09:33 )    Color: Yellow / Appearance: Clear / S.028 / pH: x  Gluc: x / Ketone: Negative  / Bili: Negative / Urobili: Negative   Blood: x / Protein: 100 / Nitrite: Negative   Leuk Esterase: Negative / RBC: 2 /hpf / WBC 2 /HPF   Sq Epi: x / Non Sq Epi: 3 /hpf / Bacteria: Negative            RADIOLOGY & ADDITIONAL TESTS:    Imaging Personally Reviewed:    Consultant(s) Notes Reviewed:      Care Discussed with Consultants/Other Providers:   Monae Singh MD  PGY 1 Department of Internal Medicine        Patient is a 47y old  Male who presents with a chief complaint of Shortness of breath, Chest and Back Pain (2022 07:52)      SUBJECTIVE / OVERNIGHT EVENTS: Pt seen and examined. Reports has not had bowel movement in 2 days. Continues to have intermittent cough, some w/ paroxysm. Denies fevers, chills, CP, SOB, Abdominal pain, N/V, Constipation, Diarrhea        MEDICATIONS  (STANDING):  acetaminophen     Tablet .. 1000 milliGRAM(s) Oral every 6 hours  albuterol/ipratropium for Nebulization 3 milliLiter(s) Nebulizer every 6 hours  aspirin enteric coated 81 milliGRAM(s) Oral daily  benzonatate 200 milliGRAM(s) Oral every 8 hours  budesonide 160 MICROgram(s)/formoterol 4.5 MICROgram(s) Inhaler 2 Puff(s) Inhalation two times a day  colchicine 0.6 milliGRAM(s) Oral two times a day  cyclobenzaprine 5 milliGRAM(s) Oral at bedtime  dextrose 5%. 1000 milliLiter(s) (50 mL/Hr) IV Continuous <Continuous>  dextrose 5%. 1000 milliLiter(s) (100 mL/Hr) IV Continuous <Continuous>  dextrose 50% Injectable 25 Gram(s) IV Push once  dextrose 50% Injectable 12.5 Gram(s) IV Push once  dextrose 50% Injectable 25 Gram(s) IV Push once  enoxaparin Injectable 40 milliGRAM(s) SubCutaneous every 24 hours  furosemide   Injectable 60 milliGRAM(s) IV Push daily  glucagon  Injectable 1 milliGRAM(s) IntraMuscular once  hydroxychloroquine 400 milliGRAM(s) Oral daily  insulin lispro (ADMELOG) corrective regimen sliding scale   SubCutaneous three times a day before meals  insulin lispro (ADMELOG) corrective regimen sliding scale   SubCutaneous at bedtime  levothyroxine 125 MICROGram(s) Oral daily  lidocaine   4% Patch 2 Patch Transdermal daily  losartan 50 milliGRAM(s) Oral daily  methylPREDNISolone sodium succinate Injectable 40 milliGRAM(s) IV Push daily  pantoprazole    Tablet 40 milliGRAM(s) Oral before breakfast  polyethylene glycol 3350 17 Gram(s) Oral daily  potassium chloride    Tablet ER 40 milliEquivalent(s) Oral once  senna 1 Tablet(s) Oral daily  trimethoprim  160 mG/sulfamethoxazole 800 mG 1 Tablet(s) Oral <User Schedule>    MEDICATIONS  (PRN):  ALBUTerol    0.083% 2.5 milliGRAM(s) Nebulizer every 3 hours PRN Shortness of Breath and/or Wheezing  dextrose Oral Gel 15 Gram(s) Oral once PRN Blood Glucose LESS THAN 70 milliGRAM(s)/deciliter  hydrocodone/homatropine Syrup 10 milliLiter(s) Oral every 4 hours PRN Cough  HYDROmorphone  Injectable 1 milliGRAM(s) IV Push every 3 hours PRN Severe Pain (7 - 10)      I&O's Summary    2022 07:01  -  2022 07:00  --------------------------------------------------------  IN: 200 mL / OUT: 300 mL / NET: -100 mL        Vital Signs Last 24 Hrs  T(C): 36.5 (2022 23:31), Max: 36.9 (2022 17:39)  T(F): 97.7 (2022 23:31), Max: 98.5 (2022 17:39)  HR: 109 (2022 23:31) (100 - 114)  BP: 144/97 (2022 23:31) (131/90 - 144/97)  BP(mean): --  RR: 18 (:31) (18 - 25)  SpO2: 96% (2022 23:31) (96% - 97%)    Parameters below as of 2022 23:31  Patient On (Oxygen Delivery Method): room air        CAPILLARY BLOOD GLUCOSE      POCT Blood Glucose.: 149 mg/dL (2022 21:06)  POCT Blood Glucose.: 228 mg/dL (2022 16:37)  POCT Blood Glucose.: 248 mg/dL (2022 12:24)  POCT Blood Glucose.: 222 mg/dL (2022 08:22)  POCT Blood Glucose.: 179 mg/dL (2022 08:16)      PHYSICAL EXAM:  GENERAL: NAD,   HEAD:  Atraumatic, Normocephalic  EYES: EOMI, PERRL, conjunctiva and sclera clear  NECK: No JVD  CHEST/LUNG: (+) Diffuse wheezing auscultated bilaterally, no rhonchi, crackles  HEART: Regular rate and rhythm; No murmurs, rubs, or gallops  ABDOMEN: Soft, Nontender, Nondistended; Bowel sounds present  EXTREMITIES:  (+) 1+ pitting edema affecting LE b/l to mid-shin L>R. 2+ Peripheral Pulses, No clubbing, cyanosis.  PSYCH: AAOx3  NEUROLOGY: non-focal  SKIN: No rashes or lesions       LABS:                        13.0   10.55 )-----------( 325      ( 2022 06:48 )             38.1     Auto Eosinophil # x     / Auto Eosinophil % x     / Auto Neutrophil # x     / Auto Neutrophil % x     / BANDS % x                            12.8   9.74  )-----------( 323      ( 2022 06:52 )             37.4     Auto Eosinophil # x     / Auto Eosinophil % x     / Auto Neutrophil # x     / Auto Neutrophil % x     / BANDS % x                            14.3   8.91  )-----------( 327      ( 2022 08:58 )             43.0     Auto Eosinophil # x     / Auto Eosinophil % x     / Auto Neutrophil # x     / Auto Neutrophil % x     / BANDS % x        07    135  |  94<L>  |  12  ----------------------------<  155<H>  3.1<L>   |  25  |  0.89      138  |  96  |  11  ----------------------------<  160<H>  3.4<L>   |  27  |  0.92      136  |  95<L>  |  10  ----------------------------<  144<H>  3.4<L>   |  27  |  1.09    Ca    9.8      2022 06:50  Mg     2.1       Phos  3.1       TPro  6.8  /  Alb  4.1  /  TBili  0.3  /  DBili  x   /  AST  95<H>  /  ALT  159<H>  /  AlkPhos  118  07-24  TPro  6.7  /  Alb  4.1  /  TBili  0.3  /  DBili  x   /  AST  43<H>  /  ALT  99<H>  /  AlkPhos  112  07-23          Urinalysis Basic - ( 2022 09:33 )    Color: Yellow / Appearance: Clear / S.028 / pH: x  Gluc: x / Ketone: Negative  / Bili: Negative / Urobili: Negative   Blood: x / Protein: 100 / Nitrite: Negative   Leuk Esterase: Negative / RBC: 2 /hpf / WBC 2 /HPF   Sq Epi: x / Non Sq Epi: 3 /hpf / Bacteria: Negative            RADIOLOGY & ADDITIONAL TESTS:    Imaging Personally Reviewed:    Consultant(s) Notes Reviewed:      Care Discussed with Consultants/Other Providers:

## 2023-06-20 NOTE — ASSESSMENT & PLAN NOTE
- UE/LE DVT   -hold apixaban in anticipation of lap adenike; bridge with IV heparin vs lovenox based on renal fxn

## 2023-06-20 NOTE — PLAN OF CARE
Plan of care reviewed with pt. Pt aaox4. Cholestectomy will be done later this week. Pt was able to tolerate dinner tonight with out any nausea. Pt had good urinary output today, Cr/bun trending down. Pts post residual void was 0 ml. VSS. Pt remained free of falls, trauma, and injury. Will continue to monitor.

## 2023-06-20 NOTE — SUBJECTIVE & OBJECTIVE
Interval History: Some nausea and vomiting overnight. Continued pain in the epigastric region. WBC up to 12.     Medications:  Continuous Infusions:  Scheduled Meds:   amLODIPine  10 mg Oral Daily    aspirin  81 mg Oral Daily    famotidine  20 mg Oral Daily    insulin aspart U-100  5 Units Subcutaneous TIDWM    insulin detemir U-100  16 Units Subcutaneous QHS    piperacillin-tazobactam (Zosyn) IV (PEDS and ADULTS) (extended infusion is not appropriate)  4.5 g Intravenous Q12H     PRN Meds:acetaminophen, albuterol, dextrose 10%, dextrose 10%, dextrose, dextrose, glucagon (human recombinant), hydrALAZINE, insulin aspart U-100, melatonin, ondansetron, prochlorperazine, sodium chloride 0.9%     Review of patient's allergies indicates:   Allergen Reactions    Milk containing products (dairy)      Causes GI distress     Objective:     Vital Signs (Most Recent):  Temp: 98.4 °F (36.9 °C) (06/20/23 1551)  Pulse: 82 (06/20/23 1551)  Resp: 18 (06/20/23 1551)  BP: (!) 151/73 (06/20/23 1551)  SpO2: 98 % (06/20/23 1551) Vital Signs (24h Range):  Temp:  [97.1 °F (36.2 °C)-98.6 °F (37 °C)] 98.4 °F (36.9 °C)  Pulse:  [68-82] 82  Resp:  [16-18] 18  SpO2:  [95 %-99 %] 98 %  BP: (151-190)/(61-85) 151/73     Weight: 62.8 kg (138 lb 7.2 oz)  Body mass index is 23.76 kg/m².    Intake/Output - Last 3 Shifts         06/18 0700  06/19 0659 06/19 0700  06/20 0659 06/20 0700  06/21 0659    P.O. 480 740     Total Intake(mL/kg) 480 (7.6) 740 (11.8)     Urine (mL/kg/hr) 1200 (0.8)      Stool       Total Output 1200      Net -720 +740            Urine Occurrence 1 x 5 x              Physical Exam  Vitals and nursing note reviewed.   Constitutional:       General: She is not in acute distress.     Appearance: Normal appearance.   HENT:      Head: Normocephalic and atraumatic.   Eyes:      Extraocular Movements: Extraocular movements intact.   Cardiovascular:      Rate and Rhythm: Normal rate.   Pulmonary:      Effort: Pulmonary effort is normal. No  tachypnea or respiratory distress.   Abdominal:      General: There is distension.      Tenderness: There is abdominal tenderness.   Neurological:      General: No focal deficit present.      Mental Status: She is alert and oriented to person, place, and time.      Cranial Nerves: No cranial nerve deficit.      Motor: No weakness.   Psychiatric:         Attention and Perception: Attention normal.         Mood and Affect: Mood and affect normal.         Speech: Speech normal.         Behavior: Behavior is cooperative.        Significant Labs:  I have reviewed all pertinent lab results within the past 24 hours.  CBC:   Recent Labs   Lab 06/20/23  0507   WBC 12.83*   RBC 3.96*   HGB 10.7*   HCT 32.9*      MCV 83   MCH 27.0   MCHC 32.5     CMP:   Recent Labs   Lab 06/20/23  0507   *   CALCIUM 9.9   ALBUMIN 1.9*   PROT 7.5      K 3.0*   CO2 24      BUN 37*   CREATININE 2.5*   ALKPHOS 893*   ALT 83*   AST 81*   BILITOT 1.9*       Significant Diagnostics:  I have reviewed all pertinent imaging results/findings within the past 24 hours.

## 2023-06-20 NOTE — PROGRESS NOTES
[facility] - Telemetry Stepdown  Nephrology  Progress Note    Patient Name: Mariann Huff  MRN: [id number]  Admission Date: 6/9/2023  Hospital Length of Stay: 11 days  Attending Provider: Sharon Dial MD   Primary Care Physician: Jasbir Haney MD  Principal Problem:<principal problem not specified>    Subjective:     HPI: Mariann Huff is a 63 y.o. F with CAD s/p PCI, HTN, HLD, DM, MURTAZA, PAD, AF, DVT, and recent pancreatitis who presents with N/V. N/V progressively worsening with PO intolerance following recent discharge for pancreatitis approximately 2 weeks prior to presentation. Denied abdominal pain. Admitted with pancreatitis and HHS. Received IVF and insulin gtt transitioned to subq. Four days ago developed elevated LFTs. MRCP unrevealing. Associated leukocytosis. Initiated on Zosyn. AES planning EUS /ERCP today out of concern for recurrent pancreatitis of unknown etiology. Yesterday was difficult to arouse which resolved spontaneously. Began having intermittent hypotension late yesterday which continued to today as low at 70/40s. Received 1 L NS over five hours overnight. CCM consulted today, noted swings in BP from low with decreased mentation and improving with waking up. AST /ALT downtrending. Bili up slightly to 6.5. Cr uptrending from 1.3 on arrival > 1.2, and yesterday up to 2.0, then 2.5 today from her baseline on ~ 1.0-1.3. Midodrine 10 mg TID was initiated today and Zosyn continued. MRI with contrast on 6/11/23. No other contrasted studies or nephrotoxins noted. Nephrology consulted for PAPA.      Interval History: Urine output 1.2L overnight with improvement in renal function (3.3 to 2.5)    Review of patient's allergies indicates:   Allergen Reactions    Milk containing products (dairy)      Causes GI distress     Current Facility-Administered Medications   Medication Frequency    acetaminophen tablet 650 mg Q4H PRN    albuterol inhaler 2 puff Q6H PRN    amLODIPine tablet 10 mg Daily    apixaban tablet  2.5 mg BID    aspirin chewable tablet 81 mg Daily    ciprofloxacin HCl tablet 500 mg Daily    dextrose 10% bolus 125 mL 125 mL PRN    dextrose 10% bolus 250 mL 250 mL PRN    dextrose 40 % gel 15,000 mg PRN    dextrose 40 % gel 30,000 mg PRN    famotidine tablet 20 mg Daily    glucagon (human recombinant) injection 1 mg PRN    hydrALAZINE tablet 25 mg Q8H PRN    insulin aspart U-100 pen 0-5 Units PRN    insulin aspart U-100 pen 3 Units TIDWM    insulin detemir U-100 (Levemir) pen 16 Units QHS    melatonin tablet 6 mg Nightly PRN    metroNIDAZOLE tablet 500 mg Q8H    ondansetron injection 4 mg Q6H PRN    prochlorperazine injection Soln 5 mg Q6H PRN    sodium chloride 0.9% flush 10 mL PRN     Facility-Administered Medications Ordered in Other Encounters   Medication Frequency    0.9%  NaCl infusion Continuous       Objective:     Vital Signs (Most Recent):  Temp: 98.6 °F (37 °C) (06/20/23 0325)  Pulse: 68 (06/20/23 0352)  Resp: 18 (06/20/23 0325)  BP: (!) 171/61 (06/20/23 0325)  SpO2: 98 % (06/20/23 0500) Vital Signs (24h Range):  Temp:  [96.1 °F (35.6 °C)-98.6 °F (37 °C)] 98.6 °F (37 °C)  Pulse:  [68-80] 68  Resp:  [16-18] 18  SpO2:  [95 %-99 %] 98 %  BP: (166-190)/(61-86) 171/61     Weight: 62.8 kg (138 lb 7.2 oz) (06/09/23 2311)  Body mass index is 23.76 kg/m².  Body surface area is 1.68 meters squared.    I/O last 3 completed shifts:  In: 740 [P.O.:740]  Out: 1200 [Urine:1200]    Physical Exam  Constitutional:       Appearance: Normal appearance.   HENT:      Head: Normocephalic and atraumatic.      Mouth/Throat:      Mouth: Mucous membranes are moist.      Pharynx: Oropharynx is clear.   Eyes:      Extraocular Movements: Extraocular movements intact.      Pupils: Pupils are equal, round, and reactive to light.   Cardiovascular:      Rate and Rhythm: Normal rate and regular rhythm.      Pulses: Normal pulses.      Heart sounds: Normal heart sounds.   Pulmonary:      Effort: Pulmonary effort is normal. No  respiratory distress.      Breath sounds: Normal breath sounds.   Abdominal:      General: Abdomen is flat. Bowel sounds are normal.      Palpations: Abdomen is soft.   Musculoskeletal:         General: No swelling.      Cervical back: Neck supple.   Skin:     General: Skin is warm and dry.      Capillary Refill: Capillary refill takes less than 2 seconds.   Neurological:      General: No focal deficit present.      Mental Status: She is alert and oriented to person, place, and time. Mental status is at baseline.   Psychiatric:         Mood and Affect: Mood normal.         Behavior: Behavior normal.         Thought Content: Thought content normal.         Judgment: Judgment normal.        Significant Labs:  All labs within the past 24 hours have been reviewed.     Significant Imaging:  All imaging within the past 24 hours has been reviewed.    Assessment/Plan:     Renal/  Hydronephrosis  Patient with mild to moderate hydronephrosis noted 6/16 and not seen in previous RP US in 10/2022 with known previous left ureteral injury s/p ureteral reimplantation and follows with urology. Do not suspect it is main  of PAPA but may be contributing.    Recommendations:  -- follow up RP US and consider urology consultation    PAPA (acute kidney injury)  Patient with baseline creatine 1.0-1.3, on admission 1.7 and worsened to peak 3.8 on 6/19. Now improving    Suspect PAPA likely ischemic ATN in s/o hemodynamic instability 2/2 sepsis due to intra-abdominal source    6/13 UA with trace protein, 4+ glu, 2+ ketones, trace blood, 1+ bili, few bacteria  Urine microscopy preformed in nephro lab on 6/16/23 with many granular casts, consistent with ATN  RP US with mild to moderate left hydronephrosis and distended bladder on 6/16, bedside ultrasound 6/19 with similar finding    Recommendations:  -- Continue daily or qshift bladder scans and consider correa catheter if volume >300ml on bladder scan    -- Strict intake and output  --  Maintain hemodynamic stability  -- Avoid nephrotoxins  -- Renally dose medications        Thank you for your consult. I will sign off. Please contact us if you have any additional questions.    Vance Huston MD  Nephrology  [facility] - Telemetry Stepdown    ATTENDING PHYSICIAN ATTESTATION  I have personally verified the history and examined the patient. I thoroughly reviewed the demographic, clinical, laboratorial and imaging information available in medical records. I agree with the assessment and recommendations provided by the subspecialty resident who was under my supervision.

## 2023-06-20 NOTE — PT/OT/SLP EVAL
"Physical Therapy Co-Evaluation and Co-Treatment with OT    Patient Name:  Mariann Huff   MRN:  [id number]    Recent Surgery: Procedure(s) (LRB):  ULTRASOUND, UPPER GI TRACT, ENDOSCOPIC (N/A)  ERCP (ENDOSCOPIC RETROGRADE CHOLANGIOPANCREATOGRAPHY) (N/A) 5 Days Post-Op    Patient required co-tx with OT secondary to need for multiple set of skilled hands to provide safest therapy and best outcomes.      Recommendations:     Discharge Recommendations:  home health PT   Discharge Equipment Recommendations: none   Barriers to discharge: None    Highest Level of Mobility: Gait 85'  Assistance Required: Initially CGA, progressed to SBA no AD    Assessment:     Mariann Huff is a 63 y.o. female admitted with a medical diagnosis of ATN (acute tubular necrosis). She presents with the following impairments/functional limitations:  weakness, impaired endurance, gait instability, impaired balance, impaired functional mobility    Pt met with HOB elevated and agreeable to PT session. Pt reports her PLOF is (I) for functional mobility with prn use of "hurrycane." Currently, pt requires CGA-SBA for gait training using no AD. She demos mild L LE scissoring and increased lateral postural sway.    Pt would benefit from continued skilled acute PT 3x/wk to address above listed functional deficits, provide patient/caregiver education, reduce fall risk, and maximize (I) and safety with functional mobility. After hospital discharge, pt would benefit from HHPT to maximize rehab potential.    Rehab Prognosis: Good; patient would benefit from acute skilled PT services to address these deficits and reach maximum level of function.      Plan:     During this hospitalization, patient to be seen 4 x/week to address the identified rehab impairments via gait training, therapeutic activities, therapeutic exercises, neuromuscular re-education and progress toward the following goals:    Plan of Care Expires:  07/20/23    This plan of care has been " "discussed with the patient/caregiver, who was included in its development and is in agreement with the identified goals and treatment plan.     Subjective     Communicated with RN prior to session.  Patient agreeable to participate.     Chief Complaint: ATN  Patient/Family Comments/goals: None stated    Pain/Comfort:  Pain Rating 1: 0/10  Pain Rating Post-Intervention 1: 0/10    Patients cultural, spiritual, Gnosticist conflicts given the current situation: no    Patient's living environment is as follows:  Living Environment: Pt lives with spouse in [facility] with 0 DAVID. Bathroom set-up: walk-in shower, built in bench  Prior Level of Function: modified (I) for mobility and ADLs using hurry cane prn as AD   DME used: cane, straight  DME owned (not currently used): none  Upon discharge, patient will have assistance from: Spouse    Objective:     Patient found HOB elevated with pulse ox (continuous), peripheral IV  upon PT entry to room.    General Precautions: Standard, fall   Orthopedic Precautions:N/A   Braces: N/A   BP (!) 164/75 (BP Location: Right arm, Patient Position: Sitting)   Pulse 79   Temp 98 °F (36.7 °C) (Oral)   Resp 18   Ht 5' 4" (1.626 m)   Wt 62.8 kg (138 lb 7.2 oz)   LMP  (LMP Unknown)   SpO2 97%   Breastfeeding No   BMI 23.76 kg/m²   Oxygen Device:  room air      Exams:    Cognition:  Patient is oriented to Person, Place, Time, Situation  Follows multistep  commands  Insight to deficits/safety awareness: intact    Edema: None present    Postural examination/scapula alignment: Rounded shoulder    Lower Extremity Range of Motion:  Right Lower Extremity: WNL  Left Lower Extremity: WNL    Lower Extremity Strength    Right LE  Left LE    Hip Flexion: 4+/5 Hip Flexion: 4+/5   Knee Extension: 5/5 Knee Extension: 5/5   Knee Flexion: 5/5 Knee Flexion: 5/5   Ankle Dorsiflexion:  5/5 Ankle Dorsiflexion: 5/5   Ankle Plantarflexion: 5/5 Ankle Plantarflexion: 5/5        Sensation:   Light touch sensation: " Intact BLEs    Functional Mobility:    Bed Mobility:  Supine to Sit: Supervision or Set-up Assistance on R side of bed  Scooting anteriorly to EOB to plant feet on floor: Supervision or Set-up Assistance    Transfers:   Sit to Stand Transfer: Stand-by Assistance  from EOB with no AD   Bed to Chair: Stand-by Assistance with no AD              Gait:  Patient received gait training 85 feet with initially CGA, progressed to SBA and  no AD  Gait Assessment: occasional unsteady gait, decreased step length, narrow base of support, flexed posture, decreased marina, inconsistent left foot placement, and occasional L LE scissored gait  Gait Pattern Observed: Step-through reciprocal  Comments: All lines remained intact throughout ambulation trial, gait belt utilized. PT provided cues for improved upright posture. Discussed scissoring with pt.     Balance:  Static Sit:   Supervision at EOB   Normal: Patient able to maintain steady balance without handhold support.  Static Stand:   Stand-By Assist with no AD  Dynamic Stand:  Stand-By Assist with no AD        Therapeutic Activities/Exercises     Patient assisted with functional mobility as noted above  Discussed at length benefits of PT as well as d/c recommendations. Pt agreeable  Patient educated on the importance of early mobility, OOB to prevent functional decline during hospital stay  Patient was instructed to utilize staff assistance for mobility/transfers.  Patient is appropriate to transfer with sba and RN/PCT assist  Patient educated on PT POC and role of PT in acute care  White board updated to include patient's safest level of mobility with staff assistance, RN also updated    AM-PAC 6 CLICK MOBILITY  Turning over in bed (including adjusting bedclothes, sheets and blankets)?: 4  Sitting down on and standing up from a chair with arms (e.g., wheelchair, bedside commode, etc.): 4  Moving from lying on back to sitting on the side of the bed?: 4  Moving to and from a bed  to a chair (including a wheelchair)?: 3  Need to walk in hospital room?: 3  Climbing 3-5 steps with a railing?: 3  Basic Mobility Total Score: 21      Patient left up in chair with all lines intact, call button in reach, and RN notified.      History/Goals:     PAST MEDICAL HISTORY:  Past Medical History:   Diagnosis Date    Anticoagulant long-term use     Asthma     Back pain     Bradycardia, unspecified 5/8/2019    The etiology of the bradycardic episode is unclear.  The have appear to be respiratory in origin (at least the 1st episode).  We will continue to monitor carefully.  We are awaiting evaluation by Cardiology.      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, with long-term current use of insulin 1/9/2020       Past Surgical History:   Procedure Laterality Date    ANTEGRADE NEPHROSTOGRAPHY Left 12/11/2019    Procedure: Nephrostogram - antegrade;  Surgeon: Robin Boyd MD;  Location: [address];  Service: Urology;  Laterality: Left;    BRONCHOSCOPY N/A 5/2/2019    Procedure: BRONCHOSCOPY;  Surgeon: Sean Ruano MD;  Location: [address];  Service: General;  Laterality: N/A;    CARDIAC CATHETERIZATION      CATARACT EXTRACTION      cataract extraction left eye      cataracts      CAUDAL EPIDURAL STEROID INJECTION N/A 2/7/2019    Procedure: Injection-steroid-epidural-caudal;  Surgeon: Dave Bentley Jr., MD;  Location: [facility];   Service: Pain Management;  Laterality: N/A;     SECTION, LOW TRANSVERSE      COLONOSCOPY N/A 2019    Procedure: COLONOSCOPY;  Surgeon: Al Alaniz MD;  Location: [facility] ENDO (2ND FLR);  Service: Endoscopy;  Laterality: N/A;    CORONARY ANGIOPLASTY      CYSTOGRAM N/A 2019    Procedure: CYSTOGRAM INTRAOP;  Surgeon: Robin Boyd MD;  [facility];  Service: Urology;  Laterality: N/A;  1 HOUR    CYSTOSCOPY W/ RETROGRADES Left 2019    Procedure: CYSTOSCOPY, WITH RETROGRADE PYELOGRAM;  Surgeon: Robin Boyd MD;  [facility];  Service: Urology;  Laterality: Left;  fluro    ENDOSCOPIC ULTRASOUND OF UPPER GASTROINTESTINAL TRACT N/A 6/15/2023    Procedure: ULTRASOUND, UPPER GI TRACT, ENDOSCOPIC;  Surgeon: Lisa Kim MD;  [facility]  Service: Endoscopy;  Laterality: N/A;    ERCP N/A 6/15/2023    Procedure: ERCP (ENDOSCOPIC RETROGRADE CHOLANGIOPANCREATOGRAPHY);  Surgeon: Lisa Kim MD;  [facility]  Service: Endoscopy;  Laterality: N/A;    ESOPHAGOGASTRODUODENOSCOPY W/ PEG  2019    Procedure: EGD, WITH PEG TUBE INSERTION;  Surgeon: Sean Ruano MD;  [facility];  Service: General;;    EXCISION TURBINATE, SUBMUCOUS      FLEXIBLE SIGMOIDOSCOPY N/A 2019    Procedure: SIGMOIDOSCOPY, FLEXIBLE;  Surgeon: ALBERTO Amin MD;  [facility]  Service: Endoscopy;  Laterality: N/A;    FLEXIBLE SIGMOIDOSCOPY N/A 2019    Procedure: SIGMOIDOSCOPY, FLEXIBLE;  Surgeon: ALBERTO Amin MD;  [facility]  Service: Endoscopy;  Laterality: N/A;    FUSION OF LUMBAR SPINE BY ANTERIOR APPROACH Left 2019    Procedure: FUSION, SPINE, LUMBAR, ANTERIOR APPROACH Left L5-S1 Anterior to Psoa Interbody Fusion, L5-S1 Posterior Instrumentation;  Surgeon: Mk George MD;  [facility];  Service: Neurosurgery;  Laterality: Left;  Porcedure:  Left L5-S1 Anterior to  Psoa Interbody Fusion, L5-S1 Posterior Instrumentation  Surgery Time: 4 Hrs  LOS: 2-3  Anesthesia: General   Blood: Type & Screen  R    HAND SURGERY Left     HAND SURGERY Right     torn ligament repair/ Dr. Yeboah    HYSTERECTOMY      INJECTION OF STEROID Right 12/10/2020    Procedure: INJECTION, STEROID Right SI Joint Block and Steroid Injection;  Surgeon: Mk George MD;  Location: [facility];  Service: Neurosurgery;  Laterality: Right;  Procedure: Right SI Joint Block and Steroid Injection   SUrgery Time: 30 Min  LOS: 0  Anesthesia: MAC  Radiology: C-arm  Bed: Tomer 4 Poster  Position: Prone    INJECTION OF STEROID Right 9/28/2021    Procedure: INJECTION, STEROID Right SI joint block & steroid injection;  Surgeon: Mk George MD;  Location: [facility];  Service: Neurosurgery;  Laterality: Right;  Procedure: Right SI joint block & steroid injection  Surgery Time: 30m  Anesthesia: Local MAC  Radiology: C-arm  Bed: Regular  Position: Prone    left foot surgery      left wrist surgery      LYSIS OF ADHESIONS N/A 2/19/2020    Procedure: LYSIS, ADHESIONS;  Surgeon: Robin Boyd MD;  Location: [address];  Service: Urology;  Laterality: N/A;    NASAL SEPTUM SURGERY  5/7/15    PERCUTANEOUS NEPHROSTOMY Left 4/21/2019    Procedure: Creation, Nephrostomy, Percutaneous;  Surgeon: Karina Surgeon;  Location: [facility];  Service: Anesthesiology;  Laterality: Left;    REPAIR OF URETER  4/12/2019    Procedure: REPAIR, URETER;  Surgeon: Mk George MD;  Location: [address];  Service: Neurosurgery;;    REPLACEMENT OF NEPHROSTOMY TUBE N/A 7/18/2019    Procedure: REPLACEMENT, NEPHROSTOMY TUBE;  Surgeon: [facility] Diagnostic Provider;  [address]  Service: Anesthesiology;  Laterality: N/A;  188    REPLACEMENT OF NEPHROSTOMY TUBE N/A 7/24/2019    Procedure: REPLACEMENT, NEPHROSTOMY TUBE;  Surgeon: [facility] Diagnostic Provider;  [address]  Service: Anesthesiology;  Laterality: N/A;  188     REPLACEMENT OF NEPHROSTOMY TUBE N/A 10/7/2019    Procedure: REPLACEMENT, NEPHROSTOMY TUBE;  Surgeon: [facility] Diagnostic Provider;  [facility];  Service: Anesthesiology;  Laterality: N/A;  189    REPLACEMENT OF NEPHROSTOMY TUBE N/A 2019    Procedure: REPLACEMENT, NEPHROSTOMY TUBE;  Surgeon: Leo Diagnostic Provider;  [facility];  Service: Anesthesiology;  Laterality: N/A;  Room 188/Bessy    REPLACEMENT OF NEPHROSTOMY TUBE Right 2020    Procedure: REPLACEMENT, NEPHROSTOMY TUBE removal removal;  Surgeon: Robin Boyd MD;  [facility];  Service: Urology;  Laterality: Right;    rt elbow surgery      S/P LAD COATED STENT  2010    6 total     S/P OM1 STENT  2003    SINUS SURGERY      F.E.S.S[place]    TRACHEOSTOMY N/A 2019    Procedure: CREATION, TRACHEOSTOMY;  Surgeon: Sean Ruano MD;  Location: [facility] OR [address] [place] [address];  Service: General;  Laterality: N/A;    TUBAL LIGATION      URETERAL REIMPLANTION Left 2020    Procedure: REIMPLANTATION, URETER WITH PSOAS HITCH;  Surgeon: Robin Boyd MD;  Location: [facility] OR [address] [place] [address];  Service: Urology;  Laterality: Left;       GOALS:   Multidisciplinary Problems       Physical Therapy Goals          Problem: Physical Therapy    Goal Priority Disciplines Outcome Goal Variances Interventions   Physical Therapy Goal     PT, PT/OT Ongoing, Progressing     Description: Goals to be met by: 23     Patient will increase functional independence with mobility by performin. Sit to stand transfer with [place]  3. Bed to chair transfer with Modified [place] using LRAD as needed  4. Gait  x 300 feet with Modified [place] using LRAD as needed.   5. Lower extremity exercise program x15 reps per handout, with assistance as needed                         Time Tracking:     PT Received On: 23  PT Start Time: 926     PT Stop Time: 942  PT Total Time (min): 16 min     Billable Minutes: Evaluation 8 and Gait  Training 8      Luiza Garcia, PT  06/20/2023  Pager# [phone]

## 2023-06-20 NOTE — ASSESSMENT & PLAN NOTE
-initially hyperkalemia at admit but developed hypokalemia as renal function improved  -very judicious repletion of potassium due to history of hyperkalemia

## 2023-06-20 NOTE — PROGRESS NOTES
[facility] - Telemetry Stepdown  Endocrinology  Progress Note    Admit Date: 2023     63 year old  female with type 2 diabetes mellitus, CKDIV, CAD, hypertension, hyperlipidemia, MURTAZA (not on CPAP), PAD, Afib and hx of DVT on Eliquis and recent admission for pancreatitis who presents with HHS and developing possible biliary obstruction.     - Patient recently admitted  to  for acute pancreatitis attributed to Trulicity, which was discontinued upon d/c. Per pt's PCP this was 2nd episode of pancreatitis while on trulicity -- prior episode when increasing dose of Trulicity.   Pt states upon d/c  she was feeling slight improvement but still having intermittent n/v and not tolerating normal diet. N/V began to get worse in last few days which prompted admission. No abdominal pain on presentation for current admission, unlike last admit when she had severe abd pain for pancreatitis.    In the ED patient afebrile and hemodynamically stable saturating well on room air. Lipase >2000. BG >700 and serum osmo 326 after 1L IVFs. B-hydroxybutyrate 1.3. pH 7.4 and bicarb 34. AG 12. Patient started on aggressive IVFs and insulin gtt for HHS.     - Endocrinology consulted for initial HHS and glucose management      Regarding Type 2 Diabetes Mellitus:    - Initially diagnosed with Type 2 diabetes mellitus: Over 20 years prior  - Home diabetic medications include: Farxiga 10mg qd, Glipizide ER 10mg daily, Levemir 20u qhs (but never started). She has been on MDI previously, most recently she was on Toujeo and Humalog discontinued 2021 but she states she was on insulin up until about 10mo ago.  - Family History: Not asked  - Weight based dosin kg x 0.4 (CKD) = 25 TDD x 0.5 = 12 basal / 12 prandial  - 1700/TDD = 68 (estimated insulin sensitivity factor)  - 450/TDD = 18 (estimated starting carb ratio for prandial dosing)    Lab Results   Component Value Date    HGBA1C 8.1 (H) 2023    HGBA1C 8.2  "(H) 2023    HGBA1C 8.8 (H) 2023    CPEPTIDE 1.36 2017     Lab Results   Component Value Date    EGFRNORACEVR 12.8 (A) 2023    EGFRNORACEVR 16.4 (A) 2023    EGFRNORACEVR 19.3 (A) 2023       Interval HPI:   Overnight events: BG 140s-200s last 24hr. Appetite is better and overall states she is feeling better. D/c plan TBD.  Eatin%  Nausea: No  Hypoglycemia and intervention: No  Fever: No  TPN and/or TF: No  If yes, type of TF/TPN and rate: n/a    BP (!) 164/75 (BP Location: Right arm, Patient Position: Sitting)   Pulse 79   Temp 98 °F (36.7 °C) (Oral)   Resp 18   Ht 5' 4" (1.626 m)   Wt 62.8 kg (138 lb 7.2 oz)   LMP  (LMP Unknown)   SpO2 97%   Breastfeeding No   BMI 23.76 kg/m²     Labs Reviewed and Include    Recent Labs   Lab 23  0507   *   CALCIUM 9.9   ALBUMIN 1.9*   PROT 7.5      K 3.0*   CO2 24      BUN 37*   CREATININE 2.5*   ALKPHOS 893*   ALT 83*   AST 81*   BILITOT 1.9*     Lab Results   Component Value Date    WBC 12.83 (H) 2023    HGB 10.7 (L) 2023    HCT 32.9 (L) 2023    MCV 83 2023     2023     No results for input(s): TSH, FREET4 in the last 168 hours.  Lab Results   Component Value Date    HGBA1C 8.1 (H) 2023       Nutritional status:   Body mass index is 23.76 kg/m².  Lab Results   Component Value Date    ALBUMIN 1.9 (L) 2023    ALBUMIN 1.6 (L) 2023    ALBUMIN 1.5 (L) 2023     No results found for: PREALBUMIN    Estimated Creatinine Clearance: 20.1 mL/min (A) (based on SCr of 2.5 mg/dL (H)).    Accu-Checks  Recent Labs     23  0814 23  1222 23  1715 23  2121 23  0741 23  1159 23  1536 23  2115 23  0810 23  1132   POCTGLUCOSE 145* 222* 215* 210* 146* 252* 243* 193* 166* 203*       Current Medications and/or Treatments Impacting Glycemic Control  Immunotherapy:    Immunosuppressants       None      "     Steroids:   Hormones (From admission, onward)      Start     Stop Route Frequency Ordered    06/09/23 2028  melatonin tablet 6 mg         -- Oral Nightly PRN 06/09/23 1931          Pressors:    Autonomic Drugs (From admission, onward)      None          Hyperglycemia/Diabetes Medications:   Antihyperglycemics (From admission, onward)      Start     Stop Route Frequency Ordered    06/20/23 1645  insulin aspart U-100 pen 5 Units         -- SubQ 3 times daily with meals 06/20/23 1341    06/17/23 2100  insulin detemir U-100 (Levemir) pen 16 Units         -- SubQ Nightly 06/17/23 0807    06/10/23 1315  insulin regular in 0.9 % NaCl 100 unit/100 mL (1 unit/mL) infusion        Question:  Enter initial dose (Units/hr):  Answer:  0.6    06/10 2200 IV Continuous 06/10/23 1211    06/10/23 1309  insulin aspart U-100 pen 0-5 Units         -- SubQ As needed (PRN) 06/10/23 1211            ASSESSMENT and PLAN    Cardiac/Vascular  Hyperlipidemia associated with type 2 diabetes mellitus  - On Lipitor 40mg and Repatha outpatient  - Management per primary    Hypertension associated with diabetes  Hypertensive on admission,  Still with high BP frequently  On multiple anti-HTNs outpatient  Management per primary      Renal/  PAPA (acute kidney injury)  - Improved/resolved. Likely 2/2 IVVD 2/2 HHS on admission  - Management per primary    Endocrine  Type 2 diabetes mellitus with chronic kidney disease  - HHS on admission. Recent pancreatitis admission, not yet back to normal po intake with worsening hepatic function. Taking Glipizide and Farxiga at home. Never picked up Levemir Rx recently prescribed by her PCP    - 24 hour glucose trend: 100s-low 200s    - Continue Levemir 16 units at night  -  Novolog 3 units before meals, continue low dose correction Novolog prn  - Accuchecks ac/hs    - If blood glucose greater than 300, please ask patient not to eat food or drink anything other than water until correctional insulin has brought it  back below 250    **Please notify endocrine of change in diet or plans for NPO for surgery (or any other reason) as this may change her insulin requirement**    GI  History of pancreatitis  - She has now had two episodes of pancreatitis on Trulicity, however suspicion for choledocholithiasis as cause for pancreatitis  - Laparoscopic cholecystectomy planned although date TBD - possibly outpatient surgery after d/c per primary   - Hold Trulicity at discharge        Jerilyn Harrison MD  Endocrinology  [facility] - Telemetry Stepdown

## 2023-06-20 NOTE — CONSULTS
Thank you for your consult to [facility]. We have reviewed the patient chart. This patient does meet criteria for [facility] service at this time.  Will assume care on 06/20/23 at 7AM.    Sharon iDal MD

## 2023-06-21 PROBLEM — Z75.8 DISCHARGE PLANNING ISSUES: Status: ACTIVE | Noted: 2023-06-21

## 2023-06-21 PROBLEM — E83.39 HYPOPHOSPHATEMIA: Status: ACTIVE | Noted: 2023-06-21

## 2023-06-21 LAB
ALBUMIN SERPL BCP-MCNC: 1.9 G/DL (ref 3.5–5.2)
ALP SERPL-CCNC: 853 U/L (ref 55–135)
ALT SERPL W/O P-5'-P-CCNC: 65 U/L (ref 10–44)
ANION GAP SERPL CALC-SCNC: 11 MMOL/L (ref 8–16)
ANISOCYTOSIS BLD QL SMEAR: SLIGHT
AST SERPL-CCNC: 83 U/L (ref 10–40)
BASOPHILS # BLD AUTO: 0.08 K/UL (ref 0–0.2)
BASOPHILS NFR BLD: 0.6 % (ref 0–1.9)
BILIRUB SERPL-MCNC: 1.6 MG/DL (ref 0.1–1)
BUN SERPL-MCNC: 29 MG/DL (ref 8–23)
CALCIUM SERPL-MCNC: 9.3 MG/DL (ref 8.7–10.5)
CHLORIDE SERPL-SCNC: 106 MMOL/L (ref 95–110)
CO2 SERPL-SCNC: 23 MMOL/L (ref 23–29)
COMMENT: NORMAL
CREAT SERPL-MCNC: 1.8 MG/DL (ref 0.5–1.4)
DIFFERENTIAL METHOD: ABNORMAL
EOSINOPHIL # BLD AUTO: 0.5 K/UL (ref 0–0.5)
EOSINOPHIL NFR BLD: 3.7 % (ref 0–8)
ERYTHROCYTE [DISTWIDTH] IN BLOOD BY AUTOMATED COUNT: 15.2 % (ref 11.5–14.5)
EST. GFR  (NO RACE VARIABLE): 31.5 ML/MIN/1.73 M^2
FINAL PATHOLOGIC DIAGNOSIS: NORMAL
GLUCOSE SERPL-MCNC: 171 MG/DL (ref 70–110)
HCT VFR BLD AUTO: 29.7 % (ref 37–48.5)
HGB BLD-MCNC: 9.6 G/DL (ref 12–16)
IMM GRANULOCYTES # BLD AUTO: 0.43 K/UL (ref 0–0.04)
IMM GRANULOCYTES NFR BLD AUTO: 3.5 % (ref 0–0.5)
LYMPHOCYTES # BLD AUTO: 1 K/UL (ref 1–4.8)
LYMPHOCYTES NFR BLD: 8.2 % (ref 18–48)
Lab: NORMAL
MAGNESIUM SERPL-MCNC: 1.7 MG/DL (ref 1.6–2.6)
MCH RBC QN AUTO: 27.4 PG (ref 27–31)
MCHC RBC AUTO-ENTMCNC: 32.3 G/DL (ref 32–36)
MCV RBC AUTO: 85 FL (ref 82–98)
MONOCYTES # BLD AUTO: 1 K/UL (ref 0.3–1)
MONOCYTES NFR BLD: 8.1 % (ref 4–15)
NEUTROPHILS # BLD AUTO: 9.4 K/UL (ref 1.8–7.7)
NEUTROPHILS NFR BLD: 75.9 % (ref 38–73)
NRBC BLD-RTO: 0 /100 WBC
OVALOCYTES BLD QL SMEAR: ABNORMAL
PHOSPHATE SERPL-MCNC: 1.9 MG/DL (ref 2.7–4.5)
PLATELET # BLD AUTO: 298 K/UL (ref 150–450)
PLATELET BLD QL SMEAR: ABNORMAL
PMV BLD AUTO: 10.7 FL (ref 9.2–12.9)
POCT GLUCOSE: 100 MG/DL (ref 70–110)
POCT GLUCOSE: 155 MG/DL (ref 70–110)
POCT GLUCOSE: 168 MG/DL (ref 70–110)
POCT GLUCOSE: 225 MG/DL (ref 70–110)
POIKILOCYTOSIS BLD QL SMEAR: SLIGHT
POTASSIUM SERPL-SCNC: 3 MMOL/L (ref 3.5–5.1)
PROT SERPL-MCNC: 6.9 G/DL (ref 6–8.4)
RBC # BLD AUTO: 3.5 M/UL (ref 4–5.4)
SODIUM SERPL-SCNC: 140 MMOL/L (ref 136–145)
SPHEROCYTES BLD QL SMEAR: ABNORMAL
WBC # BLD AUTO: 12.37 K/UL (ref 3.9–12.7)

## 2023-06-21 PROCEDURE — 36415 COLL VENOUS BLD VENIPUNCTURE: CPT | Performed by: FAMILY MEDICINE

## 2023-06-21 PROCEDURE — 25000003 PHARM REV CODE 250: Performed by: INTERNAL MEDICINE

## 2023-06-21 PROCEDURE — 94761 N-INVAS EAR/PLS OXIMETRY MLT: CPT

## 2023-06-21 PROCEDURE — 25000003 PHARM REV CODE 250: Performed by: STUDENT IN AN ORGANIZED HEALTH CARE EDUCATION/TRAINING PROGRAM

## 2023-06-21 PROCEDURE — 99232 PR SUBSEQUENT HOSPITAL CARE,LEVL II: ICD-10-PCS | Mod: GT,,, | Performed by: INTERNAL MEDICINE

## 2023-06-21 PROCEDURE — 63600175 PHARM REV CODE 636 W HCPCS: Performed by: INTERNAL MEDICINE

## 2023-06-21 PROCEDURE — 83735 ASSAY OF MAGNESIUM: CPT | Performed by: FAMILY MEDICINE

## 2023-06-21 PROCEDURE — 63600175 PHARM REV CODE 636 W HCPCS: Performed by: FAMILY MEDICINE

## 2023-06-21 PROCEDURE — 84100 ASSAY OF PHOSPHORUS: CPT | Performed by: FAMILY MEDICINE

## 2023-06-21 PROCEDURE — 99232 SBSQ HOSP IP/OBS MODERATE 35: CPT | Mod: GT,,, | Performed by: INTERNAL MEDICINE

## 2023-06-21 PROCEDURE — 99232 SBSQ HOSP IP/OBS MODERATE 35: CPT | Mod: ,,, | Performed by: INTERNAL MEDICINE

## 2023-06-21 PROCEDURE — 85025 COMPLETE CBC W/AUTO DIFF WBC: CPT | Performed by: FAMILY MEDICINE

## 2023-06-21 PROCEDURE — 20600001 HC STEP DOWN PRIVATE ROOM

## 2023-06-21 PROCEDURE — 25000003 PHARM REV CODE 250

## 2023-06-21 PROCEDURE — 99232 PR SUBSEQUENT HOSPITAL CARE,LEVL II: ICD-10-PCS | Mod: ,,, | Performed by: INTERNAL MEDICINE

## 2023-06-21 PROCEDURE — 25000003 PHARM REV CODE 250: Performed by: HOSPITALIST

## 2023-06-21 PROCEDURE — 80053 COMPREHEN METABOLIC PANEL: CPT | Performed by: STUDENT IN AN ORGANIZED HEALTH CARE EDUCATION/TRAINING PROGRAM

## 2023-06-21 RX ORDER — INSULIN ASPART 100 [IU]/ML
6 INJECTION, SOLUTION INTRAVENOUS; SUBCUTANEOUS
Status: DISCONTINUED | OUTPATIENT
Start: 2023-06-22 | End: 2023-06-22

## 2023-06-21 RX ORDER — INSULIN ASPART 100 [IU]/ML
7 INJECTION, SOLUTION INTRAVENOUS; SUBCUTANEOUS
Status: DISCONTINUED | OUTPATIENT
Start: 2023-06-21 | End: 2023-06-21

## 2023-06-21 RX ORDER — POTASSIUM CHLORIDE 20 MEQ/1
40 TABLET, EXTENDED RELEASE ORAL 2 TIMES DAILY
Status: CANCELLED | OUTPATIENT
Start: 2023-06-21 | End: 2023-06-22

## 2023-06-21 RX ORDER — ENOXAPARIN SODIUM 100 MG/ML
1 INJECTION SUBCUTANEOUS EVERY 24 HOURS
Status: DISCONTINUED | OUTPATIENT
Start: 2023-06-21 | End: 2023-06-23

## 2023-06-21 RX ORDER — INSULIN ASPART 100 [IU]/ML
8 INJECTION, SOLUTION INTRAVENOUS; SUBCUTANEOUS
Status: DISCONTINUED | OUTPATIENT
Start: 2023-06-22 | End: 2023-06-21

## 2023-06-21 RX ORDER — HYDRALAZINE HYDROCHLORIDE 25 MG/1
25 TABLET, FILM COATED ORAL EVERY 12 HOURS
Status: DISCONTINUED | OUTPATIENT
Start: 2023-06-21 | End: 2023-06-25 | Stop reason: HOSPADM

## 2023-06-21 RX ORDER — POTASSIUM CHLORIDE 20 MEQ/1
20 TABLET, EXTENDED RELEASE ORAL ONCE
Status: COMPLETED | OUTPATIENT
Start: 2023-06-21 | End: 2023-06-21

## 2023-06-21 RX ORDER — INSULIN ASPART 100 [IU]/ML
6 INJECTION, SOLUTION INTRAVENOUS; SUBCUTANEOUS
Status: DISCONTINUED | OUTPATIENT
Start: 2023-06-21 | End: 2023-06-21

## 2023-06-21 RX ORDER — INSULIN ASPART 100 [IU]/ML
7 INJECTION, SOLUTION INTRAVENOUS; SUBCUTANEOUS
Status: DISCONTINUED | OUTPATIENT
Start: 2023-06-22 | End: 2023-06-21

## 2023-06-21 RX ORDER — ISOSORBIDE DINITRATE 10 MG/1
10 TABLET ORAL 2 TIMES DAILY
Status: DISCONTINUED | OUTPATIENT
Start: 2023-06-21 | End: 2023-06-25 | Stop reason: HOSPADM

## 2023-06-21 RX ORDER — SODIUM,POTASSIUM PHOSPHATES 280-250MG
1 POWDER IN PACKET (EA) ORAL
Status: COMPLETED | OUTPATIENT
Start: 2023-06-21 | End: 2023-06-21

## 2023-06-21 RX ADMIN — INSULIN ASPART 5 UNITS: 100 INJECTION, SOLUTION INTRAVENOUS; SUBCUTANEOUS at 08:06

## 2023-06-21 RX ADMIN — ASPIRIN 81 MG 81 MG: 81 TABLET ORAL at 10:06

## 2023-06-21 RX ADMIN — INSULIN ASPART 2 UNITS: 100 INJECTION, SOLUTION INTRAVENOUS; SUBCUTANEOUS at 12:06

## 2023-06-21 RX ADMIN — AMLODIPINE BESYLATE 10 MG: 10 TABLET ORAL at 10:06

## 2023-06-21 RX ADMIN — INSULIN DETEMIR 16 UNITS: 100 INJECTION, SOLUTION SUBCUTANEOUS at 08:06

## 2023-06-21 RX ADMIN — POTASSIUM & SODIUM PHOSPHATES POWDER PACK 280-160-250 MG 1 PACKET: 280-160-250 PACK at 10:06

## 2023-06-21 RX ADMIN — POTASSIUM & SODIUM PHOSPHATES POWDER PACK 280-160-250 MG 1 PACKET: 280-160-250 PACK at 05:06

## 2023-06-21 RX ADMIN — ONDANSETRON 4 MG: 2 INJECTION INTRAMUSCULAR; INTRAVENOUS at 08:06

## 2023-06-21 RX ADMIN — INSULIN ASPART 7 UNITS: 100 INJECTION, SOLUTION INTRAVENOUS; SUBCUTANEOUS at 05:06

## 2023-06-21 RX ADMIN — HYDRALAZINE HYDROCHLORIDE 25 MG: 25 TABLET, FILM COATED ORAL at 08:06

## 2023-06-21 RX ADMIN — PIPERACILLIN SODIUM AND TAZOBACTAM SODIUM 4.5 G: 4; .5 INJECTION, POWDER, LYOPHILIZED, FOR SOLUTION INTRAVENOUS at 08:06

## 2023-06-21 RX ADMIN — PIPERACILLIN SODIUM AND TAZOBACTAM SODIUM 4.5 G: 4; .5 INJECTION, POWDER, LYOPHILIZED, FOR SOLUTION INTRAVENOUS at 04:06

## 2023-06-21 RX ADMIN — POTASSIUM BICARBONATE 50 MEQ: 978 TABLET, EFFERVESCENT ORAL at 12:06

## 2023-06-21 RX ADMIN — POTASSIUM & SODIUM PHOSPHATES POWDER PACK 280-160-250 MG 1 PACKET: 280-160-250 PACK at 08:06

## 2023-06-21 RX ADMIN — INSULIN ASPART 7 UNITS: 100 INJECTION, SOLUTION INTRAVENOUS; SUBCUTANEOUS at 12:06

## 2023-06-21 RX ADMIN — ISOSORBIDE DINITRATE 10 MG: 10 TABLET ORAL at 08:06

## 2023-06-21 RX ADMIN — PIPERACILLIN SODIUM AND TAZOBACTAM SODIUM 4.5 G: 4; .5 INJECTION, POWDER, LYOPHILIZED, FOR SOLUTION INTRAVENOUS at 12:06

## 2023-06-21 RX ADMIN — FAMOTIDINE 20 MG: 20 TABLET, FILM COATED ORAL at 10:06

## 2023-06-21 RX ADMIN — POTASSIUM CHLORIDE 20 MEQ: 1500 TABLET, EXTENDED RELEASE ORAL at 10:06

## 2023-06-21 RX ADMIN — INSULIN ASPART 1 UNITS: 100 INJECTION, SOLUTION INTRAVENOUS; SUBCUTANEOUS at 08:06

## 2023-06-21 RX ADMIN — INSULIN ASPART 1 UNITS: 100 INJECTION, SOLUTION INTRAVENOUS; SUBCUTANEOUS at 05:06

## 2023-06-21 RX ADMIN — ENOXAPARIN SODIUM 60 MG: 60 INJECTION SUBCUTANEOUS at 12:06

## 2023-06-21 NOTE — ASSESSMENT & PLAN NOTE
- HHS on admission. Recent pancreatitis admission, not yet back to normal po intake with worsening hepatic function. Taking Glipizide and Farxiga at home. Never picked up Levemir Rx recently prescribed by her PCP    - 24 hour glucose trend:160s-210, fasting BG 150s-160s. 200s primarily prandial    - Continue Levemir 16 units at night  -  Increase Novolog to 7u units before meals, continue low dose correction Novolog prn  - Accuchecks ac/hs    - DM diet  - Hypoglycemia protocol   - If blood glucose greater than 300, please ask patient not to eat food or drink anything other than water until correctional insulin has brought it back below 250    **Please notify endocrine of change in diet or plans for NPO for surgery (or any other reason) as this may change her insulin requirement**

## 2023-06-21 NOTE — ASSESSMENT & PLAN NOTE
"Lipase >2000 on presentation, downtrending without any identifiable cause for elevation. Patient without any abdominal pain. Discontinue HCTZ as it could potentially be cause of pancreatitis as well  - US abdomen with gallbladder sludge and "prominence" of the pancreatic duct   - liver enzymes, alk-phos, bilirubin elevated on 06/11, suggestive of possible choledocholithiasis as cause of pancreatitis.  MRCP ordered  - MRCP on my personal interpretation without any focal obstruction or retained stones, however liver enzymes continue to elevate  - AES was consulted for findings and after discussion, recommended ERCP/EUS once off apixaban and pletal which was completed on 6/15 - biliary sludging found, CBD dilated.   - General surgery assessed for cholecystectomy - planned for 6/23/2023 with apixaban held  - resume zosyn due to elevate WBC and alk phos; bland diet      "

## 2023-06-21 NOTE — PLAN OF CARE
Plan of care reviewed with pt. Pt aaox4. Cholestectomy will be done Friday. Pt tolerating food with no n/v today. Pt had good urinary output, Cr/bun trending down.  VSS. Pt remained free of falls, trauma, and injury. Will continue to monitor

## 2023-06-21 NOTE — ASSESSMENT & PLAN NOTE
-initially hyperkalemia at admit but developed hypokalemia as renal function improved  -very judicious repletion of potassium due to history of hyperkalemia/PAPA

## 2023-06-21 NOTE — ASSESSMENT & PLAN NOTE
PAPA 2/2 ATN  Improving -- Cr 1.8 today (6/21) from 2.5 yesterday (6/20) -- this may account for increasing insulin requirements   Keep renal function in mind when adjusting insulin doses as renal clearance impacts insulin requirement

## 2023-06-21 NOTE — ASSESSMENT & PLAN NOTE
Diet: Diet [facility]; Lactose Restricted, Consistent Carbohydrate  Significant LDAs:   IV Access Type: Peripheral  Urinary Catheter Indication if present: Patient Does Not Have Urinary Catheter  Other Lines/Tubes/Drains:     Goals of Care:    Previous admission:  5/25/23  Code Status: Full Code  Likely prognosis:  Good  Advance Care Planning   Date: 06/21/2023  Patient wishes to continue curative therapies       REBECCA: 6/23/2023     Code Status: Full Code   Is the patient medically ready for discharge?: No    Reason for patient still in hospital (select all that apply): Patient trending condition, Laboratory test, Treatment, Consult recommendations and Other (specify) Raym Aimee on 6/23/23  Discharge Plan A: Home with family   Discharge Delays: None known at this time      The amount of time spent during, and associated with, this encounter was 40 minutes; the nature of the activities performed were:  Preparing to see the patient, chart review  Obtaining and/or receiving separately obtained history   Performing medically appropriate examination and evaluation  Counseling and educating the patient/family/caregiver  Ordering medications, tests, or procedures  Referring to and communicating with other health care professionals  Documenting clinical information in the EHR  Independently interpreting results  Care coordination

## 2023-06-21 NOTE — ASSESSMENT & PLAN NOTE
Creatinine 1.7 on presentation ; baseline 1.0  Estimated Creatinine Clearance: 28 mL/min (A) (based on SCr of 1.8 mg/dL (H)).  - IVFs stopped, recurrent PAPA on 6/12 with FeNA indication pre-renal disease. 1L LR bolus ordered over 3 hrs  - worsening renal function in setting of hypotension on 6/15 - ATN felt present by Nephology in conjunction with some urinary retention issues.   - repeating renal ultrasound 6/19 to assess if improved urination has improved size left hydronephrosis - improved; consulted and discussed plan of care with urology who recommend checking postvoid residual  - Nephrology team consulted and have signed off now that function is improving  -encourage adequate po hydration

## 2023-06-21 NOTE — ASSESSMENT & PLAN NOTE
- She has now had two episodes of pancreatitis on Trulicity, however biliary pancreatitis per eval this admission - likely multifactorial with GLP1 as contributing factor  - Laparoscopic cholecystectomy -- possibly this admission, gen surg note states possibly this Friday 6/23   [facility]

## 2023-06-21 NOTE — SUBJECTIVE & OBJECTIVE
"Interval HPI:   Overnight events: NAEON. Now planning for inpatient cholecystectomy, poss Friday.  Eatin%  Nausea: No  Hypoglycemia and intervention: No  Fever: No  TPN and/or TF: No  If yes, type of TF/TPN and rate: n/a    BP (!) 161/84 (BP Location: Right arm, Patient Position: Lying)   Pulse 80   Temp 98.7 °F (37.1 °C) (Oral)   Resp 18   Ht 5' 4" (1.626 m)   Wt 62.8 kg (138 lb 7.2 oz)   LMP  (LMP Unknown)   SpO2 98%   Breastfeeding No   BMI 23.76 kg/m²     Labs Reviewed and Include    Recent Labs   Lab 23  0355   *   CALCIUM 9.3   ALBUMIN 1.9*   PROT 6.9      K 3.0*   CO2 23      BUN 29*   CREATININE 1.8*   ALKPHOS 853*   ALT 65*   AST 83*   BILITOT 1.6*     Lab Results   Component Value Date    WBC 12.37 2023    HGB 9.6 (L) 2023    HCT 29.7 (L) 2023    MCV 85 2023     2023     No results for input(s): TSH, FREET4 in the last 168 hours.  Lab Results   Component Value Date    HGBA1C 8.1 (H) 2023       Nutritional status:   Body mass index is 23.76 kg/m².  Lab Results   Component Value Date    ALBUMIN 1.9 (L) 2023    ALBUMIN 1.9 (L) 2023    ALBUMIN 1.6 (L) 2023     No results found for: PREALBUMIN    Estimated Creatinine Clearance: 28 mL/min (A) (based on SCr of 1.8 mg/dL (H)).    Accu-Checks  Recent Labs     23  2121 23  0741 23  1159 23  1536 23  2115 23  0810 23  1132 23  1550 23  2127 23  0743   POCTGLUCOSE 210* 146* 252* 243* 193* 166* 203* 210* 204* 168*       Current Medications and/or Treatments Impacting Glycemic Control  Immunotherapy:    Immunosuppressants       None          Steroids:   Hormones (From admission, onward)      Start     Stop Route Frequency Ordered    23  melatonin tablet 6 mg         -- Oral Nightly PRN 23 193          Pressors:    Autonomic Drugs (From admission, onward)      None      "     Hyperglycemia/Diabetes Medications:   Antihyperglycemics (From admission, onward)      Start     Stop Route Frequency Ordered    06/21/23 1130  insulin aspart U-100 pen 7 Units         -- SubQ 3 times daily with meals 06/21/23 1033    06/17/23 2100  insulin detemir U-100 (Levemir) pen 16 Units         -- SubQ Nightly 06/17/23 0807    06/10/23 1315  insulin regular in 0.9 % NaCl 100 unit/100 mL (1 unit/mL) infusion        Question:  Enter initial dose (Units/hr):  Answer:  0.6    06/10 2200 IV Continuous 06/10/23 1211    06/10/23 1309  insulin aspart U-100 pen 0-5 Units         -- SubQ As needed (PRN) 06/10/23 1211

## 2023-06-21 NOTE — ASSESSMENT & PLAN NOTE
Patient with acute kidney injury likely due to acute tubular necrosis PAPA is currently improving. Labs reviewed- Renal function/electrolytes with Estimated Creatinine Clearance: 28 mL/min (A) (based on SCr of 1.8 mg/dL (H)). according to latest data. Monitor urine output and serial BMP and adjust therapy as needed. Avoid nephrotoxins and renally dose meds for GFR listed above.

## 2023-06-21 NOTE — PROGRESS NOTES
[facility] - Telemetry Stepdown  Endocrinology  Progress Note    Admit Date: 2023     63 year old  female with type 2 diabetes mellitus, CKDIV, CAD, hypertension, hyperlipidemia, MURTAZA (not on CPAP), PAD, Afib and hx of DVT on Eliquis and recent admission for pancreatitis who presents with HHS and developing possible biliary obstruction.     - Patient recently admitted  to  for acute pancreatitis attributed to Trulicity, which was discontinued upon d/c. Per pt's PCP this was 2nd episode of pancreatitis while on trulicity -- prior episode when increasing dose of Trulicity.   Pt states upon d/c  she was feeling slight improvement but still having intermittent n/v and not tolerating normal diet. N/V began to get worse in last few days which prompted admission. No abdominal pain on presentation for current admission, unlike last admit when she had severe abd pain for pancreatitis.    In the ED patient afebrile and hemodynamically stable saturating well on room air. Lipase >2000. BG >700 and serum osmo 326 after 1L IVFs. B-hydroxybutyrate 1.3. pH 7.4 and bicarb 34. AG 12. Patient started on aggressive IVFs and insulin gtt for HHS.     - Endocrinology consulted for initial HHS and glucose management      Regarding Type 2 Diabetes Mellitus:    - Initially diagnosed with Type 2 diabetes mellitus: Over 20 years prior  - Home diabetic medications include: Farxiga 10mg qd, Glipizide ER 10mg daily, Levemir 20u qhs (but never started). She has been on MDI previously, most recently she was on Toujeo and Humalog discontinued 2021 but she states she was on insulin up until about 10mo ago.  - Family History: Not asked  - Weight based dosin kg x 0.4 (CKD) = 25 TDD x 0.5 = 12 basal / 12 prandial  - 1700/TDD = 68 (estimated insulin sensitivity factor)  - 450/TDD = 18 (estimated starting carb ratio for prandial dosing)    Lab Results   Component Value Date    HGBA1C 8.1 (H) 2023    HGBA1C 8.2  "(H) 2023    HGBA1C 8.8 (H) 2023    CPEPTIDE 1.36 2017     Lab Results   Component Value Date    EGFRNORACEVR 12.8 (A) 2023    EGFRNORACEVR 16.4 (A) 2023    EGFRNORACEVR 19.3 (A) 2023       Interval HPI:   Overnight events: NAEON. Now planning for inpatient cholecystectomy, poss Friday.  Eatin%  Nausea: No  Hypoglycemia and intervention: No  Fever: No  TPN and/or TF: No  If yes, type of TF/TPN and rate: n/a    BP (!) 161/84 (BP Location: Right arm, Patient Position: Lying)   Pulse 80   Temp 98.7 °F (37.1 °C) (Oral)   Resp 18   Ht 5' 4" (1.626 m)   Wt 62.8 kg (138 lb 7.2 oz)   LMP  (LMP Unknown)   SpO2 98%   Breastfeeding No   BMI 23.76 kg/m²     Labs Reviewed and Include    Recent Labs   Lab 23  0355   *   CALCIUM 9.3   ALBUMIN 1.9*   PROT 6.9      K 3.0*   CO2 23      BUN 29*   CREATININE 1.8*   ALKPHOS 853*   ALT 65*   AST 83*   BILITOT 1.6*     Lab Results   Component Value Date    WBC 12.37 2023    HGB 9.6 (L) 2023    HCT 29.7 (L) 2023    MCV 85 2023     2023     No results for input(s): TSH, FREET4 in the last 168 hours.  Lab Results   Component Value Date    HGBA1C 8.1 (H) 2023       Nutritional status:   Body mass index is 23.76 kg/m².  Lab Results   Component Value Date    ALBUMIN 1.9 (L) 2023    ALBUMIN 1.9 (L) 2023    ALBUMIN 1.6 (L) 2023     No results found for: PREALBUMIN    Estimated Creatinine Clearance: 28 mL/min (A) (based on SCr of 1.8 mg/dL (H)).    Accu-Checks  Recent Labs     23  0741 23  1159 23  1536 23  2115 23  0810 23  1132 23  1550 237 23  0743   POCTGLUCOSE 210* 146* 252* 243* 193* 166* 203* 210* 204* 168*       Current Medications and/or Treatments Impacting Glycemic Control  Immunotherapy:    Immunosuppressants       None          Steroids:   Hormones (From admission, onward) "      Start     Stop Route Frequency Ordered    06/09/23 2028  melatonin tablet 6 mg         -- Oral Nightly PRN 06/09/23 1931          Pressors:    Autonomic Drugs (From admission, onward)      None          Hyperglycemia/Diabetes Medications:   Antihyperglycemics (From admission, onward)      Start     Stop Route Frequency Ordered    06/21/23 1130  insulin aspart U-100 pen 7 Units         -- SubQ 3 times daily with meals 06/21/23 1033    06/17/23 2100  insulin detemir U-100 (Levemir) pen 16 Units         -- SubQ Nightly 06/17/23 0807    06/10/23 1315  insulin regular in 0.9 % NaCl 100 unit/100 mL (1 unit/mL) infusion        Question:  Enter initial dose (Units/hr):  Answer:  0.6    06/10 2200 IV Continuous 06/10/23 1211    06/10/23 1309  insulin aspart U-100 pen 0-5 Units         -- SubQ As needed (PRN) 06/10/23 1211            ASSESSMENT and PLAN    Cardiac/Vascular  Hyperlipidemia associated with type 2 diabetes mellitus  - On Lipitor 40mg and Repatha outpatient  - Management per primary    Hypertension associated with diabetes  On multiple anti-HTNs outpatient  Management per primary      Renal/  * ATN (acute tubular necrosis)  PAPA 2/2 ATN  Improving -- Cr 1.8 today (6/21) from 2.5 yesterday (6/20) -- this may account for increasing insulin requirements   Keep renal function in mind when adjusting insulin doses as renal clearance impacts insulin requirement       Endocrine  Type 2 diabetes mellitus with chronic kidney disease  - [facility] on admission. Recent pancreatitis admission, not yet back to normal po intake with worsening hepatic function. Taking Glipizide and Farxiga at home. Never picked up Levemir Rx recently prescribed by her PCP    - 24 hour glucose trend:160s-210, fasting BG 150s-160s. 200s primarily prandial    - Continue Levemir 16 units at night  -  Increase Novolog to 7u units before meals, continue low dose correction Novolog prn  - Accuchecks ac/hs    - DM diet  - Hypoglycemia protocol   - If  blood glucose greater than 300, please ask patient not to eat food or drink anything other than water until correctional insulin has brought it back below 250    **Please notify endocrine of change in diet or plans for NPO for surgery (or any other reason) as this may change her insulin requirement**    GI  History of pancreatitis  - She has now had two episodes of pancreatitis on Trulicity, however biliary pancreatitis per eval this admission - likely multifactorial with GLP1 as contributing factor  - Laparoscopic cholecystectomy -- possibly this admission, gen surg note states possibly this Friday 6/23   - Hold Trulicity at discharge        Jerilyn Harrison MD  Endocrinology  [facility] - Telemetry Stepdown

## 2023-06-21 NOTE — ASSESSMENT & PLAN NOTE
Restarting home amlodipine on 6/19  -holding ARB due to PAPA  -resume nitrate with hydralazine  -will continue to monitor and adjust regimen as necessary

## 2023-06-21 NOTE — PROGRESS NOTES
[facility] - Telemetry [facility] Medicine  Telemedicine Progress Note    Patient Name: Mariann Huff  MRN: [id number]  Patient Class: IP- Inpatient   Admission Date: 6/9/2023  Length of Stay: 12 days  Attending Physician: Sharon Dial MD  Primary Care Provider: Jasbir Haney MD          Subjective:     Principal Problem:ATN (acute tubular necrosis)        HPI:  63F with PMH of CAD with GINGER , HTN, HLD, DM2, MURTAZA (not on CPAP), PAD, Afib and hx of DVT on Eliquis and recent admission for pancreatitis wo presents to the ED with nausea and vomiting. Patient recently admitted 05/25 to 05/26 for first episode of acute pancreatitis. Lipase >2000 at that time. CT abdomen and US without evidence of biliary obstruction or pancreatic inflammation. Lipid panel wnl. Patient pain and n/v improved with treatment for acute pancreatitis felt secondary to home med Trulicity. She was discharged with new insulin regimen which she reports she never picked up from pharmacy so has been taking farxiga only at home. States that she has had progressively worsening n/v and po intolerance. States that she is not having severe pain similar to recent pancreatitis. Denies fevers, chills, chest pain, shortness of breath.     In the ED patient afebrile and hemodynamically stable saturating well on room air. Lipase >2000. BG >700 and serum osmo 326 after 1L IVFs. B-hydroxybutyrate 1.3. pH 7.4 and bicarb 34. AG 12. Patient started on aggressive IVFs and insulin gtt for management on pancreatitis and HHS. Admitted to the care of medicine for further evaluation and management.       Overview/Hospital Course:  Patient transitioned from insulin drip to subcutaneous insulin on 06/10.  On 06/11, patient developed new hyperbilirubinemia and worsening liver enzymes and given history of pancreatitis concern for possible choledocholithiasis.  MRCP ordered did not demonstrate any focal obstruction showever liver enzymes and alk phos increasing in  size. In speaking with AES, patient to be off apixaban and pletal prior to ERCP.        This follow-up encounter was provided through telemedicine to address  ATN (acute tubular necrosis) present on admission.  Patient was transferred to the telemedicine service on:  06/20/2023   [facility] 6/9/2023  2:20 PM.      Interval History/Overnight Events:   Patient is able to provide adequate history.    Denies pain - tolerating diet - ambulatory in room - awaiting lap adenike planned for 6/23    Review of Systems   Constitutional:  Positive for fatigue. Negative for fever.   Respiratory:  Negative for shortness of breath.    Cardiovascular:  Negative for chest pain.   Gastrointestinal:  Negative for abdominal pain, diarrhea and nausea.        I have reviewed the following on 06/21/2023:     Details     [x]   Lab results  WBC stable at 12; Hgb at 9; K repleted as low at 3; Cr improved to 1.8; ALT improved and bili improved    []   Micro reports     []   Pathology reports     []   Imaging reports     []   Cardiology Procedure reports     []   Outside records/CareEverywhere     []  Independently viewed:         Inpatient Medications reviewed and prescribed for management of current problems:  Scheduled Meds:   amLODIPine  10 mg Oral Daily    aspirin  81 mg Oral Daily    enoxparin  1 mg/kg Subcutaneous Q24H (treatment, non-standard time)    famotidine  20 mg Oral Daily    insulin aspart U-100  7 Units Subcutaneous TIDWM    insulin detemir U-100  16 Units Subcutaneous QHS    piperacillin-tazobactam (Zosyn) IV (PEDS and ADULTS) (extended infusion is not appropriate)  4.5 g Intravenous Q8H    potassium, sodium phosphates  1 packet Oral QID (AC & HS)     Continuous Infusions:  PRN Meds:.acetaminophen, albuterol, dextrose 10%, dextrose 10%, dextrose, dextrose, glucagon (human recombinant), hydrALAZINE, insulin aspart U-100, melatonin, ondansetron, prochlorperazine, sodium chloride 0.9%       Objective:     Temp:  [96.3 °F (35.7 °C)-98.9 °F (37.2 °C)] 98.5 °F (36.9 °C)  Pulse:  [71-80] 79  Resp:  [16-19] 17  SpO2:  [95 %-98 %] 95 %  BP: (140-171)/(61-84) 140/68      Intake/Output Summary (Last 24 hours) at 6/21/2023 1641  Last data filed at 6/21/2023 0403  Gross per 24 hour   Intake 50 ml   Output 300 ml   Net -250 ml          Body mass index is 23.76 kg/m².    Physical Exam  Vitals and nursing note reviewed.   Constitutional:       General: She is not in acute distress.     Appearance: She is ill-appearing.   HENT:      Head: Normocephalic and atraumatic.   Eyes:      Extraocular Movements: Extraocular movements intact.   Cardiovascular:      Rate and Rhythm: Normal rate.   Pulmonary:      Effort: Pulmonary effort is normal. No tachypnea or respiratory distress.   Neurological:      General: No focal deficit present.      Mental Status: She is alert and oriented to person, place, and time.      Cranial Nerves: No cranial nerve deficit.      Motor: No weakness.   Psychiatric:         Attention and Perception: Attention normal.         Mood and Affect: Mood and affect normal.         Speech: Speech normal.         Behavior: Behavior is cooperative.        Labs: All labs within the last 24 hours were reviewed.   Recent Results (from the past 24 hour(s))   POCT glucose    Collection Time: 06/20/23  9:27 PM   Result Value Ref Range    POCT Glucose 204 (H) 70 - 110 mg/dL   Phosphorus    Collection Time: 06/21/23  3:55 AM   Result Value Ref Range    Phosphorus 1.9 (L) 2.7 - 4.5 mg/dL   CBC auto differential    Collection Time: 06/21/23  3:55 AM   Result Value Ref Range    WBC 12.37 3.90 - 12.70 K/uL    RBC 3.50 (L) 4.00 - 5.40 M/uL    Hemoglobin 9.6 (L) 12.0 - 16.0 g/dL    Hematocrit 29.7 (L) 37.0 - 48.5 %    MCV 85 82 - 98 fL    MCH 27.4 27.0 - 31.0 pg    MCHC 32.3 32.0 - 36.0 g/dL    RDW 15.2 (H) 11.5 - 14.5 %    Platelets 298 150 - 450 K/uL    MPV 10.7 9.2 - 12.9 fL    Immature Granulocytes 3.5 (H) 0.0 -  0.5 %    Gran # (ANC) 9.4 (H) 1.8 - 7.7 K/uL    Immature Grans (Abs) 0.43 (H) 0.00 - 0.04 K/uL    Lymph # 1.0 1.0 - 4.8 K/uL    Mono # 1.0 0.3 - 1.0 K/uL    Eos # 0.5 0.0 - 0.5 K/uL    Baso # 0.08 0.00 - 0.20 K/uL    nRBC 0 0 /100 WBC    Gran % 75.9 (H) 38.0 - 73.0 %    Lymph % 8.2 (L) 18.0 - 48.0 %    Mono % 8.1 4.0 - 15.0 %    Eosinophil % 3.7 0.0 - 8.0 %    Basophil % 0.6 0.0 - 1.9 %    Platelet Estimate Appears normal     Aniso Slight     Poik Slight     Ovalocytes Occasional     Spherocytes Occasional     Differential Method Automated    Magnesium    Collection Time: 06/21/23  3:55 AM   Result Value Ref Range    Magnesium 1.7 1.6 - 2.6 mg/dL   Comprehensive metabolic panel    Collection Time: 06/21/23  3:55 AM   Result Value Ref Range    Sodium 140 136 - 145 mmol/L    Potassium 3.0 (L) 3.5 - 5.1 mmol/L    Chloride 106 95 - 110 mmol/L    CO2 23 23 - 29 mmol/L    Glucose 171 (H) 70 - 110 mg/dL    BUN 29 (H) 8 - 23 mg/dL    Creatinine 1.8 (H) 0.5 - 1.4 mg/dL    Calcium 9.3 8.7 - 10.5 mg/dL    Total Protein 6.9 6.0 - 8.4 g/dL    Albumin 1.9 (L) 3.5 - 5.2 g/dL    Total Bilirubin 1.6 (H) 0.1 - 1.0 mg/dL    Alkaline Phosphatase 853 (H) 55 - 135 U/L    AST 83 (H) 10 - 40 U/L    ALT 65 (H) 10 - 44 U/L    eGFR 31.5 (A) >60 mL/min/1.73 m^2    Anion Gap 11 8 - 16 mmol/L   POCT glucose    Collection Time: 06/21/23  7:43 AM   Result Value Ref Range    POCT Glucose 168 (H) 70 - 110 mg/dL   POCT glucose    Collection Time: 06/21/23 11:33 AM   Result Value Ref Range    POCT Glucose 225 (H) 70 - 110 mg/dL        Lab Results   Component Value Date    [id number] Not Detected 09/25/2021       Recent Labs   Lab 06/19/23  0411 06/20/23  0507 06/21/23  0355   WBC 9.95 12.83* 12.37   LYMPH 12.5*  1.2 13.8*  1.8 8.2*  1.0   HGB 9.6* 10.7* 9.6*   HCT 30.7* 32.9* 29.7*    342 298       Recent Labs   Lab 06/19/23  0411 06/20/23  0507 06/21/23  0355    142 140   K 3.2* 3.0* 3.0*    106 106   CO2 20* 24 23   BUN 44*  37* 29*   CREATININE 3.3* 2.5* 1.8*   * 149* 171*   CALCIUM 9.3 9.9 9.3   MG 2.2 2.0 1.7   PHOS 2.6* 2.5* 1.9*       Recent Labs   Lab 06/19/23  0411 06/20/23  0507 06/21/23  0355   ALKPHOS 869* 893* 853*   ALT 88* 83* 65*   AST 80* 81* 83*   ALBUMIN 1.6* 1.9* 1.9*   PROT 6.4 7.5 6.9   BILITOT 1.9* 1.9* 1.6*          No results for input(s): DDIMER, FERRITIN, CRP, LDH, BNP, TROPONINI, CPK in the last 72 hours.    Invalid input(s): PROCALCITONIN        Microbiology: All microbiology updates for the past 24 hours have been reviewed.  Microbiology Results (last 7 days)       Procedure Component Value Units Date/Time    Blood culture [[id number]] Collected: 06/13/23 0814    Order Status: Completed Specimen: Blood Updated: 06/18/23 1012     Blood Culture, Routine No growth after 5 days.    Narrative:      Lft hand    Blood culture [[id number]] Collected: 06/13/23 0815    Order Status: Completed Specimen: Blood Updated: 06/18/23 1012     Blood Culture, Routine No growth after 5 days.    Narrative:      Lft forearm              Imaging All imaging within the last 24 hours was reviewed.   ECG Results    None         Results for orders placed during the hospital encounter of 05/25/23    Echo    Interpretation Summary  · The estimated ejection fraction is 55%.  · The quantitatively derived ejection fraction is 52%.  · Normal right ventricular size with normal right ventricular systolic function.  · Normal left ventricular diastolic function.  · The left ventricle is normal in size with normal systolic function.  · Normal central venous pressure (3 mmHg).      US Kidney  Narrative: EXAMINATION:  US KIDNEY    CLINICAL HISTORY:  Silvino, hydronephrosis;    TECHNIQUE:  Ultrasound of the kidneys was performed including color flow and Doppler evaluation of the kidneys.    COMPARISON:  Ultrasound retroperitoneal 06/16/2023    FINDINGS:  Right kidney: The right kidney measures 12.2 cm. No cortical thinning. No loss of  corticomedullary distinction. Resistive index measures 0.86.  Upper pole 0.8 x 11.0 x 0.7 cm simple cyst, previously 0.8 x 0.8 x 1.0 cm.  No mass. No renal stone. No hydronephrosis.    Left kidney: The left kidney measures 12.1 cm. No cortical thinning. No loss of corticomedullary distinction. Resistive index measures 0.86.  Upper pole 1.9 x 2.3 x 2.0 cm simple cyst, previously 2.2 x 1.9 x 12.4 cm.  Lower pole 2.0 x 2.2 x 3.2 cm minimally complex cyst with 2 mm septation, previously 2.4 x 3.1 x 2.8 cm.  No mass. No renal stone. Mild hydronephrosis, improved from prior.    Splenic resistive index is 0.83.  Impression: Mild left hydronephrosis, improved compared to prior exam.    Elevated arterial resistive indices which may represent medical renal disease.    Bilateral simple and left minimally complex renal cysts.    Electronically signed by resident: Christian Peres  Date:    06/19/2023  Time:    21:36    Electronically signed by: Tristan Santiago  Date:    06/20/2023  Time:    08:51             Assessment/Plan:      * ATN (acute tubular necrosis)  Patient with acute kidney injury likely due to acute tubular necrosis PAPA is currently improving. Labs reviewed- Renal function/electrolytes with Estimated Creatinine Clearance: 28 mL/min (A) (based on SCr of 1.8 mg/dL (H)). according to latest data. Monitor urine output and serial BMP and adjust therapy as needed. Avoid nephrotoxins and renally dose meds for GFR listed above.       Discharge planning  Diet: Diet [facility]; Lactose Restricted, Consistent Carbohydrate  Significant LDAs:   IV Access Type: Peripheral  Urinary Catheter Indication if present: Patient Does Not Have Urinary Catheter  Other Lines/Tubes/Drains:     Goals of Care:    Previous admission:  5/25/23  Code Status: Full Code  Likely prognosis:  Good  Advance Care Planning   Date: 06/21/2023  Patient wishes to continue curative therapies      REBECCA: 6/23/2023     Code Status: Full Code   Is the  patient medically ready for discharge?: No    Reason for patient still in hospital (select all that apply): Patient trending condition, Laboratory test, Treatment, Consult recommendations and Other (specify) Lap Adenike on 6/23/23  Discharge Plan A: Home with family   Discharge Delays: None known at this time      The amount of time spent during, and associated with, this encounter was 40 minutes; the nature of the activities performed were:  Preparing to see the patient, chart review  Obtaining and/or receiving separately obtained history   Performing medically appropriate examination and evaluation  Counseling and educating the patient/family/caregiver  Ordering medications, tests, or procedures  Referring to and communicating with other health care professionals  Documenting clinical information in the EHR  Independently interpreting results  Care coordination        Hypophosphatemia  Replete as indicated to goal phos of 3  Continue to monitor.      Hypokalemia  -initially hyperkalemia at admit but developed hypokalemia as renal function improved  -very judicious repletion of potassium due to history of hyperkalemia/PAPA      Hyperbilirubinemia  - AES completed ERCP with intervention   -improving      History of pancreatitis  See acute pancreatitis      History of DVT (deep vein thrombosis)  - UE/LE DVT   -hold apixaban in anticipation of lap adenike; bridge with lovenox until surgery      Type 2 diabetes mellitus with chronic kidney disease  IP HHS/DKA Pathway initiated. On presentation BG >700 and serum osmo 326 after 1L IVFs. B-hydroxybutyrate 1.3. pH 7.4 and bicarb 34. AG 12. Patient was started on insulin drip on admission but later in the day, patient transitioned to Levemir 14 units at night and aspart 3 units with meals.  Started on diabetic diet  - patient currently on Levemir 20 units nightly, 10 units aspart with meals  - further insulin titration per endocrinology  - POC AC/HS      Acute  "pancreatitis  Lipase >2000 on presentation, downtrending without any identifiable cause for elevation. Patient without any abdominal pain. Discontinue HCTZ as it could potentially be cause of pancreatitis as well  - US abdomen with gallbladder sludge and "prominence" of the pancreatic duct   - liver enzymes, alk-phos, bilirubin elevated on 06/11, suggestive of possible choledocholithiasis as cause of pancreatitis.  MRCP ordered  - MRCP on my personal interpretation without any focal obstruction or retained stones, however liver enzymes continue to elevate  - AES was consulted for findings and after discussion, recommended ERCP/EUS once off apixaban and pletal which was completed on 6/15 - biliary sludging found, CBD dilated.   - General surgery assessed for cholecystectomy - planned for 6/23/2023 with apixaban held  - resume zosyn due to elevate WBC and alk phos; bland diet        PAD (peripheral artery disease)  - hold pletal, ASA, and statin  - started on apixaban for PAD outpatient as well as a fib  - held apixaban for ERCP and again for lap adenike    Hydronephrosis  - repeat renal ultrasound ordered for 6/19 with improvement  -urology does not plan any intervention currently  -post void residual with 0 mL volume on bladder scan      Asthma  - albuterol prn  - stable on room air        Acute encephalopathy  Perhaps related to sepsis.  Patient on 6/14 a.m., increasingly lethargic.  Per , last night she had jerking movements when attempting to go to the restroom with assistance.  On exam this morning she had asterixis and required sternal rubbing to awaken her.  - Ammonia was ordered and was normal for the past 2 days, patient has not had a bowel movement in over a week per the patient's .  We will give lactulose enema regardless of ammonia level  - VBG ordered, personal interpretation reflects no evidence of hypercapnic respiratory failure  - blood cultures were collected yesterday x2, no growth " today  - escalated patient's antibiotics from ceftriaxone and metronidazole to Zosyn given her increasing white blood cell count      PAPA (acute kidney injury)  Creatinine 1.7 on presentation ; baseline 1.0  Estimated Creatinine Clearance: 28 mL/min (A) (based on SCr of 1.8 mg/dL (H)).  - IVFs stopped, recurrent PAPA on 6/12 with FeNA indication pre-renal disease. 1L LR bolus ordered over 3 hrs  - worsening renal function in setting of hypotension on 6/15 - ATN felt present by Nephology in conjunction with some urinary retention issues.   - repeating renal ultrasound 6/19 to assess if improved urination has improved size left hydronephrosis - improved; consulted and discussed plan of care with urology who recommend checking postvoid residual  - Nephrology team consulted and have signed off now that function is improving  -encourage adequate po hydration      Hyperlipidemia associated with type 2 diabetes mellitus  Holding statin    Hypertension associated with diabetes  Restarting home amlodipine on 6/19  -holding ARB due to PAPA  -resume nitrate with hydralazine  -will continue to monitor and adjust regimen as necessary      VTE Risk Mitigation (From admission, onward)         Ordered     enoxaparin injection 60 mg  Every 24 hours         06/21/23 1127                  I have completed this tele-visit with the assistance of a telepresenter.    The attending portion of this evaluation, treatment, and documentation was performed per Sharon Dial MD via Telemedicine AudioVisual using the secure [employer] software platform with 2 way audio/video. The provider was located off-site and the patient is located in the hospital. The aforementioned video software was utilized to document the relevant history and physical exam    Sharon Dial MD  Department of Hospital Medicine   [facility] - Telemetry Stepdown

## 2023-06-21 NOTE — PROGRESS NOTES
Pharmacist Renal Dose Adjustment Note    Mariann Huff is a 63 y.o. female being treated with the medication Zosyn    Patient Data:    Vital Signs (Most Recent):  Temp: 98.4 °F (36.9 °C) (06/21/23 1134)  Pulse: 71 (06/21/23 1134)  Resp: 19 (06/21/23 1134)  BP: (!) 167/74 (06/21/23 1134)  SpO2: 97 % (06/21/23 1134) Vital Signs (72h Range):  Temp:  [96.1 °F (35.6 °C)-98.9 °F (37.2 °C)]   Pulse:  [68-82]   Resp:  [16-19]   BP: (113-190)/(58-86)   SpO2:  [92 %-100 %]      Recent Labs   Lab 06/19/23  0411 06/20/23  0507 06/21/23  0355   CREATININE 3.3* 2.5* 1.8*     Serum creatinine: 1.8 mg/dL (H) 06/21/23 0355  Estimated creatinine clearance: 28 mL/min (A)    Medication:Zosyn dose: 4.5 grams frequency every 12 hours will be changed to medication:zosyn dose:4.5 grams frequency:every 8 hours    Pharmacist's Name: Ly Smith  Pharmacist's Extension: (secure chat)

## 2023-06-21 NOTE — ASSESSMENT & PLAN NOTE
- repeat renal ultrasound ordered for 6/19 with improvement  -urology does not plan any intervention currently  -post void residual with 0 mL volume on bladder scan

## 2023-06-21 NOTE — PLAN OF CARE
Problem: Adult Inpatient Plan of Care  Goal: Plan of Care Review  Outcome: Ongoing, Progressing  Goal: Patient-Specific Goal (Individualized)  Outcome: Ongoing, Progressing  Goal: Absence of Hospital-Acquired Illness or Injury  Outcome: Ongoing, Progressing  Goal: Optimal Comfort and Wellbeing  Outcome: Ongoing, Progressing  Goal: Readiness for Transition of Care  Outcome: Ongoing, Progressing     Problem: Diabetic Ketoacidosis  Goal: Fluid and Electrolyte Balance with Absence of Ketosis  Outcome: Ongoing, Progressing     Problem: Diabetes Comorbidity  Goal: Blood Glucose Level Within Targeted Range  Outcome: Ongoing, Progressing     Problem: Fluid and Electrolyte Imbalance (Acute Kidney Injury/Impairment)  Goal: Fluid and Electrolyte Balance  Outcome: Ongoing, Progressing     Problem: Oral Intake Inadequate (Acute Kidney Injury/Impairment)  Goal: Optimal Nutrition Intake  Outcome: Ongoing, Progressing     Problem: Renal Function Impairment (Acute Kidney Injury/Impairment)  Goal: Effective Renal Function  Outcome: Ongoing, Progressing     Problem: Adjustment to Illness (Sepsis/Septic Shock)  Goal: Optimal Coping  Outcome: Ongoing, Progressing     Problem: Bleeding (Sepsis/Septic Shock)  Goal: Absence of Bleeding  Outcome: Ongoing, Progressing     Problem: Glycemic Control Impaired (Sepsis/Septic Shock)  Goal: Blood Glucose Level Within Desired Range  Outcome: Ongoing, Progressing     Problem: Infection Progression (Sepsis/Septic Shock)  Goal: Absence of Infection Signs and Symptoms  Outcome: Ongoing, Progressing     Problem: Nutrition Impaired (Sepsis/Septic Shock)  Goal: Optimal Nutrition Intake  Outcome: Ongoing, Progressing     Problem: Skin Injury Risk Increased  Goal: Skin Health and Integrity  Outcome: Ongoing, Progressing     Problem: Fall Injury Risk  Goal: Absence of Fall and Fall-Related Injury  Outcome: Ongoing, Progressing     Problem: Impaired Wound Healing  Goal: Optimal Wound Healing  Outcome:  Ongoing, Progressing       Aox4. Pt receiving abx. Discussed safety for the shift: bed in low position, bed alarm on, nonskid footwear. Call light within reach.

## 2023-06-22 ENCOUNTER — ANESTHESIA EVENT (OUTPATIENT)
Dept: SURGERY | Facility: HOSPITAL | Age: 63
DRG: 987 | End: 2023-06-22
Payer: COMMERCIAL

## 2023-06-22 LAB
ALBUMIN SERPL BCP-MCNC: 1.9 G/DL (ref 3.5–5.2)
ALP SERPL-CCNC: 887 U/L (ref 55–135)
ALT SERPL W/O P-5'-P-CCNC: 57 U/L (ref 10–44)
ANION GAP SERPL CALC-SCNC: 12 MMOL/L (ref 8–16)
ANISOCYTOSIS BLD QL SMEAR: SLIGHT
AST SERPL-CCNC: 91 U/L (ref 10–40)
BASOPHILS # BLD AUTO: 0.06 K/UL (ref 0–0.2)
BASOPHILS NFR BLD: 0.4 % (ref 0–1.9)
BILIRUB SERPL-MCNC: 1.6 MG/DL (ref 0.1–1)
BUN SERPL-MCNC: 20 MG/DL (ref 8–23)
CALCIUM SERPL-MCNC: 8.9 MG/DL (ref 8.7–10.5)
CHLORIDE SERPL-SCNC: 108 MMOL/L (ref 95–110)
CO2 SERPL-SCNC: 25 MMOL/L (ref 23–29)
CREAT SERPL-MCNC: 1.5 MG/DL (ref 0.5–1.4)
DIFFERENTIAL METHOD: ABNORMAL
EOSINOPHIL # BLD AUTO: 0.4 K/UL (ref 0–0.5)
EOSINOPHIL NFR BLD: 2.7 % (ref 0–8)
ERYTHROCYTE [DISTWIDTH] IN BLOOD BY AUTOMATED COUNT: 15.3 % (ref 11.5–14.5)
EST. GFR  (NO RACE VARIABLE): 39.2 ML/MIN/1.73 M^2
GLUCOSE SERPL-MCNC: 83 MG/DL (ref 70–110)
HCT VFR BLD AUTO: 29.2 % (ref 37–48.5)
HGB BLD-MCNC: 9.1 G/DL (ref 12–16)
HYPOCHROMIA BLD QL SMEAR: ABNORMAL
IMM GRANULOCYTES # BLD AUTO: 0.38 K/UL (ref 0–0.04)
IMM GRANULOCYTES NFR BLD AUTO: 2.3 % (ref 0–0.5)
LYMPHOCYTES # BLD AUTO: 1.4 K/UL (ref 1–4.8)
LYMPHOCYTES NFR BLD: 8.5 % (ref 18–48)
MAGNESIUM SERPL-MCNC: 1.5 MG/DL (ref 1.6–2.6)
MCH RBC QN AUTO: 26 PG (ref 27–31)
MCHC RBC AUTO-ENTMCNC: 31.2 G/DL (ref 32–36)
MCV RBC AUTO: 83 FL (ref 82–98)
MONOCYTES # BLD AUTO: 1 K/UL (ref 0.3–1)
MONOCYTES NFR BLD: 6.3 % (ref 4–15)
NEUTROPHILS # BLD AUTO: 13.1 K/UL (ref 1.8–7.7)
NEUTROPHILS NFR BLD: 79.8 % (ref 38–73)
NRBC BLD-RTO: 0 /100 WBC
OVALOCYTES BLD QL SMEAR: ABNORMAL
PHOSPHATE SERPL-MCNC: 2.4 MG/DL (ref 2.7–4.5)
PLATELET # BLD AUTO: 301 K/UL (ref 150–450)
PMV BLD AUTO: ABNORMAL FL (ref 9.2–12.9)
POCT GLUCOSE: 120 MG/DL (ref 70–110)
POCT GLUCOSE: 140 MG/DL (ref 70–110)
POCT GLUCOSE: 238 MG/DL (ref 70–110)
POCT GLUCOSE: 88 MG/DL (ref 70–110)
POIKILOCYTOSIS BLD QL SMEAR: SLIGHT
POLYCHROMASIA BLD QL SMEAR: ABNORMAL
POTASSIUM SERPL-SCNC: 3.3 MMOL/L (ref 3.5–5.1)
PROT SERPL-MCNC: 6.8 G/DL (ref 6–8.4)
RBC # BLD AUTO: 3.5 M/UL (ref 4–5.4)
SODIUM SERPL-SCNC: 145 MMOL/L (ref 136–145)
SPHEROCYTES BLD QL SMEAR: ABNORMAL
WBC # BLD AUTO: 16.42 K/UL (ref 3.9–12.7)

## 2023-06-22 PROCEDURE — 83735 ASSAY OF MAGNESIUM: CPT | Performed by: FAMILY MEDICINE

## 2023-06-22 PROCEDURE — 99232 PR SUBSEQUENT HOSPITAL CARE,LEVL II: ICD-10-PCS | Mod: ,,, | Performed by: INTERNAL MEDICINE

## 2023-06-22 PROCEDURE — 99233 SBSQ HOSP IP/OBS HIGH 50: CPT | Mod: ,,, | Performed by: STUDENT IN AN ORGANIZED HEALTH CARE EDUCATION/TRAINING PROGRAM

## 2023-06-22 PROCEDURE — 80053 COMPREHEN METABOLIC PANEL: CPT | Performed by: STUDENT IN AN ORGANIZED HEALTH CARE EDUCATION/TRAINING PROGRAM

## 2023-06-22 PROCEDURE — 99232 PR SUBSEQUENT HOSPITAL CARE,LEVL II: ICD-10-PCS | Mod: GT,,, | Performed by: INTERNAL MEDICINE

## 2023-06-22 PROCEDURE — 99232 SBSQ HOSP IP/OBS MODERATE 35: CPT | Mod: GT,,, | Performed by: INTERNAL MEDICINE

## 2023-06-22 PROCEDURE — 36415 COLL VENOUS BLD VENIPUNCTURE: CPT | Performed by: FAMILY MEDICINE

## 2023-06-22 PROCEDURE — 63600175 PHARM REV CODE 636 W HCPCS: Performed by: INTERNAL MEDICINE

## 2023-06-22 PROCEDURE — 25000003 PHARM REV CODE 250: Performed by: STUDENT IN AN ORGANIZED HEALTH CARE EDUCATION/TRAINING PROGRAM

## 2023-06-22 PROCEDURE — 25000003 PHARM REV CODE 250: Performed by: HOSPITALIST

## 2023-06-22 PROCEDURE — 85025 COMPLETE CBC W/AUTO DIFF WBC: CPT | Performed by: FAMILY MEDICINE

## 2023-06-22 PROCEDURE — 20600001 HC STEP DOWN PRIVATE ROOM

## 2023-06-22 PROCEDURE — 63600175 PHARM REV CODE 636 W HCPCS: Performed by: FAMILY MEDICINE

## 2023-06-22 PROCEDURE — 99233 PR SUBSEQUENT HOSPITAL CARE,LEVL III: ICD-10-PCS | Mod: ,,, | Performed by: STUDENT IN AN ORGANIZED HEALTH CARE EDUCATION/TRAINING PROGRAM

## 2023-06-22 PROCEDURE — 99232 SBSQ HOSP IP/OBS MODERATE 35: CPT | Mod: ,,, | Performed by: INTERNAL MEDICINE

## 2023-06-22 PROCEDURE — 25000003 PHARM REV CODE 250: Performed by: INTERNAL MEDICINE

## 2023-06-22 PROCEDURE — 84100 ASSAY OF PHOSPHORUS: CPT | Performed by: FAMILY MEDICINE

## 2023-06-22 RX ORDER — INSULIN ASPART 100 [IU]/ML
6 INJECTION, SOLUTION INTRAVENOUS; SUBCUTANEOUS
Status: DISCONTINUED | OUTPATIENT
Start: 2023-06-22 | End: 2023-06-24

## 2023-06-22 RX ORDER — POTASSIUM CHLORIDE 750 MG/1
30 CAPSULE, EXTENDED RELEASE ORAL ONCE
Status: COMPLETED | OUTPATIENT
Start: 2023-06-22 | End: 2023-06-22

## 2023-06-22 RX ORDER — INSULIN ASPART 100 [IU]/ML
5 INJECTION, SOLUTION INTRAVENOUS; SUBCUTANEOUS
Status: DISCONTINUED | OUTPATIENT
Start: 2023-06-22 | End: 2023-06-22

## 2023-06-22 RX ORDER — LANOLIN ALCOHOL/MO/W.PET/CERES
400 CREAM (GRAM) TOPICAL ONCE
Status: COMPLETED | OUTPATIENT
Start: 2023-06-22 | End: 2023-06-22

## 2023-06-22 RX ADMIN — HYDRALAZINE HYDROCHLORIDE 25 MG: 25 TABLET, FILM COATED ORAL at 08:06

## 2023-06-22 RX ADMIN — AMLODIPINE BESYLATE 10 MG: 10 TABLET ORAL at 08:06

## 2023-06-22 RX ADMIN — INSULIN ASPART 2 UNITS: 100 INJECTION, SOLUTION INTRAVENOUS; SUBCUTANEOUS at 08:06

## 2023-06-22 RX ADMIN — ONDANSETRON 4 MG: 2 INJECTION INTRAMUSCULAR; INTRAVENOUS at 04:06

## 2023-06-22 RX ADMIN — ISOSORBIDE DINITRATE 10 MG: 10 TABLET ORAL at 08:06

## 2023-06-22 RX ADMIN — POTASSIUM CHLORIDE 30 MEQ: 10 CAPSULE, COATED, EXTENDED RELEASE ORAL at 08:06

## 2023-06-22 RX ADMIN — FAMOTIDINE 20 MG: 20 TABLET, FILM COATED ORAL at 08:06

## 2023-06-22 RX ADMIN — ASPIRIN 81 MG 81 MG: 81 TABLET ORAL at 08:06

## 2023-06-22 RX ADMIN — ENOXAPARIN SODIUM 60 MG: 60 INJECTION SUBCUTANEOUS at 12:06

## 2023-06-22 RX ADMIN — PIPERACILLIN SODIUM AND TAZOBACTAM SODIUM 4.5 G: 4; .5 INJECTION, POWDER, LYOPHILIZED, FOR SOLUTION INTRAVENOUS at 12:06

## 2023-06-22 RX ADMIN — INSULIN ASPART 6 UNITS: 100 INJECTION, SOLUTION INTRAVENOUS; SUBCUTANEOUS at 12:06

## 2023-06-22 RX ADMIN — INSULIN ASPART 6 UNITS: 100 INJECTION, SOLUTION INTRAVENOUS; SUBCUTANEOUS at 05:06

## 2023-06-22 RX ADMIN — PIPERACILLIN SODIUM AND TAZOBACTAM SODIUM 4.5 G: 4; .5 INJECTION, POWDER, LYOPHILIZED, FOR SOLUTION INTRAVENOUS at 08:06

## 2023-06-22 RX ADMIN — PIPERACILLIN SODIUM AND TAZOBACTAM SODIUM 4.5 G: 4; .5 INJECTION, POWDER, LYOPHILIZED, FOR SOLUTION INTRAVENOUS at 04:06

## 2023-06-22 RX ADMIN — Medication 400 MG: at 08:06

## 2023-06-22 NOTE — PLAN OF CARE
[facility] - Telemetry Stepdown  Discharge Reassessment    Primary Care Provider: Jasbir Haney MD    Expected Discharge Date: 6/23/2023    Reassessment (most recent)       Discharge Reassessment - 06/22/23 1317          Discharge Reassessment    Assessment Type Discharge Planning Reassessment     Did the patient's condition or plan change since previous assessment? Yes     Discharge Plan discussed with: Patient     Communicated REBECCA with patient/caregiver Yes     Discharge Plan A Home Health     Discharge Plan B Home with family     DME Needed Upon Discharge  none     Transition of Care Barriers None     Why the patient remains in the hospital Requires continued medical care        Post-Acute Status    Post-Acute Authorization Home Health     Home Health Status Pending medical clearance/testing                     Ching Dominique RN  Ext [phone]

## 2023-06-22 NOTE — SUBJECTIVE & OBJECTIVE
Interval History: Virtual follow up visit for suspected Hyperglycemia [R73.9]  Chest pain [R07.9]  Non compliance w medication regimen [Z91.148]  Acute pancreatitis, unspecified complication status, unspecified pancreatitis type [K85.90]  Hyperosmolar hyperglycemic state (HHS) [E11.00] present on admission.   This service was provided by telemedicine.    Patient was transferred to [facility] on: 06/20/2023   The patient location is: [facility]   Admitted 6/9/2023  2:20 PM    The patient is able to provide adequate history. Additional history was obtained from: chart review.  No significant events overnight reported.  Patient reports complaints of nothing new  Symptoms are stable since yesterday.     I have reviewed the following on 6/22/2023:     Details     [x]   Lab results  Hypokalemia, hypomagnesemia. sCr improving. Liver enzymes stable. BGs controlled. H&H stable    []   Micro reports     []   Pathology reports     []   Imaging reports     []   Cardiology Procedure reports     []   Outside records/CareEverywhere     []  Independently viewed:       Review of Systems   Constitutional:  Negative for fever.   Respiratory:  Negative for shortness of breath.      Objective:     Vital Signs (Most Recent):  Temp: 99.5 °F (37.5 °C) (06/22/23 0747)  Pulse: 82 (06/22/23 0747)  Resp: 17 (06/22/23 0747)  BP: 139/67 (06/22/23 0747)  SpO2: 98 % (06/22/23 0747) Vital Signs (24h Range):  Temp:  [98.4 °F (36.9 °C)-99.5 °F (37.5 °C)] 99.5 °F (37.5 °C)  Pulse:  [71-90] 82  Resp:  [16-19] 17  SpO2:  [95 %-98 %] 98 %  BP: (136-181)/(67-84) 139/67     Weight: 62.8 kg (138 lb 7.2 oz)  Body mass index is 23.76 kg/m².    Intake/Output Summary (Last 24 hours) at 6/22/2023 1024  Last data filed at 6/22/2023 0423  Gross per 24 hour   Intake --   Output 700 ml   Net -700 ml         Physical Exam  Cardiovascular:      Comments: Monitor and/or Vital signs reviewed at time of visit  Pulmonary:      Effort: Pulmonary effort is  normal. No accessory muscle usage or respiratory distress.   Neurological:      Mental Status: She is alert. She is not disoriented.   Psychiatric:         Attention and Perception: Attention normal.         Behavior: Behavior is cooperative.           Significant Labs:   Recent Labs   Lab 03/29/23  0807 04/05/23  0928 05/25/23  1134   HGBA1C 8.8* 8.2* 8.1*     Recent Labs   Lab 06/22/23  1134 06/22/23  1601 06/22/23  2025   POCTGLUCOSE 140* 120* 238*     Recent Labs   Lab 06/20/23  0507 06/21/23  0355 06/22/23  0505   WBC 12.83* 12.37 16.42*   HGB 10.7* 9.6* 9.1*   HCT 32.9* 29.7* 29.2*    298 301     Recent Labs   Lab 06/20/23  0507 06/21/23  0355 06/22/23  0505   GRAN 68.0  8.7* 75.9*  9.4* 79.8*  13.1*   LYMPH 13.8*  1.8 8.2*  1.0 8.5*  1.4   MONO 10.8  1.4* 8.1  1.0 6.3  1.0   EOS 0.5 0.5 0.4     Recent Labs   Lab 06/20/23  0507 06/21/23  0355 06/22/23  0505    140 145   K 3.0* 3.0* 3.3*    106 108   CO2 24 23 25   BUN 37* 29* 20   CREATININE 2.5* 1.8* 1.5*   * 171* 83   CALCIUM 9.9 9.3 8.9   ALBUMIN 1.9* 1.9* 1.9*   MG 2.0 1.7 1.5*   PHOS 2.5* 1.9* 2.4*     Recent Labs   Lab 06/20/23  0507 06/21/23  0355 06/22/23  0505   ALKPHOS 893* 853* 887*   ALT 83* 65* 57*   AST 81* 83* 91*   PROT 7.5 6.9 6.8   BILITOT 1.9* 1.6* 1.6*     Recent Labs   Lab 06/09/23 2004 06/13/23 0813 06/14/23 2238   LACTATE 1.6 1.3 1.6     SARS-CoV2 (COVID-19) Qualitative PCR (no units)   Date Value   09/25/2021 Not Detected   12/07/2020 Not Detected     Results for orders placed during the hospital encounter of 05/25/23    Echo    Interpretation Summary  · The estimated ejection fraction is 55%.  · The quantitatively derived ejection fraction is 52%.  · Normal right ventricular size with normal right ventricular systolic function.  · Normal left ventricular diastolic function.  · The left ventricle is normal in size with normal systolic function.  · Normal central venous pressure (3 mmHg).      US  Kidney  Narrative: EXAMINATION:  US KIDNEY    CLINICAL HISTORY:  Silvino, hydronephrosis;    TECHNIQUE:  Ultrasound of the kidneys was performed including color flow and Doppler evaluation of the kidneys.    COMPARISON:  Ultrasound retroperitoneal 06/16/2023    FINDINGS:  Right kidney: The right kidney measures 12.2 cm. No cortical thinning. No loss of corticomedullary distinction. Resistive index measures 0.86.  Upper pole 0.8 x 11.0 x 0.7 cm simple cyst, previously 0.8 x 0.8 x 1.0 cm.  No mass. No renal stone. No hydronephrosis.    Left kidney: The left kidney measures 12.1 cm. No cortical thinning. No loss of corticomedullary distinction. Resistive index measures 0.86.  Upper pole 1.9 x 2.3 x 2.0 cm simple cyst, previously 2.2 x 1.9 x 12.4 cm.  Lower pole 2.0 x 2.2 x 3.2 cm minimally complex cyst with 2 mm septation, previously 2.4 x 3.1 x 2.8 cm.  No mass. No renal stone. Mild hydronephrosis, improved from prior.    Splenic resistive index is 0.83.  Impression: Mild left hydronephrosis, improved compared to prior exam.    Elevated arterial resistive indices which may represent medical renal disease.    Bilateral simple and left minimally complex renal cysts.    Electronically signed by resident: Christian Peres  Date:    06/19/2023  Time:    21:36    Electronically signed by: Tristan Santiago  Date:    06/20/2023  Time:    08:51      Labs and Imaging listed above were reviewed.

## 2023-06-22 NOTE — PROGRESS NOTES
[facility] - Telemetry Stepdown  General Surgery  Progress Note    Subjective:     History of Present Illness:  No notes on file    Post-Op Info:  Procedure(s) (LRB):  ULTRASOUND, UPPER GI TRACT, ENDOSCOPIC (N/A)  ERCP (ENDOSCOPIC RETROGRADE CHOLANGIOPANCREATOGRAPHY) (N/A)   7 Days Post-Op     Interval History: She states she is feeling well. Denies recent vomiting. Abdominal pain has improved.     Medications:  Continuous Infusions:  Scheduled Meds:   amLODIPine  10 mg Oral Daily    aspirin  81 mg Oral Daily    enoxparin  1 mg/kg Subcutaneous Q24H (treatment, non-standard time)    famotidine  20 mg Oral Daily    hydrALAZINE  25 mg Oral Q12H    insulin aspart U-100  6 Units Subcutaneous TIDWM    insulin detemir U-100  16 Units Subcutaneous QHS    isosorbide dinitrate  10 mg Oral BID    piperacillin-tazobactam (Zosyn) IV (PEDS and ADULTS) (extended infusion is not appropriate)  4.5 g Intravenous Q8H     PRN Meds:acetaminophen, albuterol, dextrose 10%, dextrose 10%, dextrose, dextrose, glucagon (human recombinant), hydrALAZINE, insulin aspart U-100, melatonin, ondansetron, prochlorperazine, sodium chloride 0.9%     Review of patient's allergies indicates:   Allergen Reactions    Milk containing products (dairy)      Causes GI distress     Objective:     Vital Signs (Most Recent):  Temp: 99.5 °F (37.5 °C) (06/22/23 0747)  Pulse: 82 (06/22/23 0747)  Resp: 17 (06/22/23 0747)  BP: 139/67 (06/22/23 0747)  SpO2: 98 % (06/22/23 0747) Vital Signs (24h Range):  Temp:  [98.4 °F (36.9 °C)-99.5 °F (37.5 °C)] 99.5 °F (37.5 °C)  Pulse:  [71-90] 82  Resp:  [16-19] 17  SpO2:  [95 %-98 %] 98 %  BP: (136-181)/(67-84) 139/67     Weight: 62.8 kg (138 lb 7.2 oz)  Body mass index is 23.76 kg/m².    Intake/Output - Last 3 Shifts         06/20 0700 06/21 0659 06/21 0700 06/22 0659 06/22 0700 06/23 0659    P.O. 50      Total Intake(mL/kg) 50 (0.8)      Urine (mL/kg/hr) 300 (0.2) 700 (0.5)     Stool  0     Total Output 300 700      Net -912 -700            Urine Occurrence 4 x      Stool Occurrence  0 x             Physical Exam  Vitals and nursing note reviewed.   Constitutional:       General: She is not in acute distress.     Appearance: Normal appearance.   HENT:      Head: Normocephalic and atraumatic.   Eyes:      Extraocular Movements: Extraocular movements intact.   Cardiovascular:      Rate and Rhythm: Normal rate.   Pulmonary:      Effort: Pulmonary effort is normal. No tachypnea or respiratory distress.   Abdominal:      General: There is no distension.      Palpations: Abdomen is soft.      Tenderness: There is no abdominal tenderness.   Neurological:      General: No focal deficit present.      Mental Status: She is alert and oriented to person, place, and time.      Cranial Nerves: No cranial nerve deficit.      Motor: No weakness.   Psychiatric:         Attention and Perception: Attention normal.         Mood and Affect: Mood and affect normal.         Speech: Speech normal.         Behavior: Behavior is cooperative.        Significant Labs:  I have reviewed all pertinent lab results within the past 24 hours.  CBC:   Recent Labs   Lab 06/22/23  0505   WBC 16.42*   RBC 3.50*   HGB 9.1*   HCT 29.2*      MCV 83   MCH 26.0*   MCHC 31.2*       CMP:   Recent Labs   Lab 06/22/23  0505   GLU 83   CALCIUM 8.9   ALBUMIN 1.9*   PROT 6.8      K 3.3*   CO2 25      BUN 20   CREATININE 1.5*   ALKPHOS 887*   ALT 57*   AST 91*   BILITOT 1.6*         Significant Diagnostics:  I have reviewed all pertinent imaging results/findings within the past 24 hours.    Assessment/Plan:     Acute pancreatitis  Mariann Huff is a 63 y.o. female PMH CAD with GINGER , HTN, HLD, DM2, MURTAZA (not on CPAP), PAD, Afib and hx of DVT on Eliquis, biliary pancreatitis admitted to [facility] on 6/9/2023 for biliary pancreatitis. Now s/p ERCP on 6/15/23 with sphincterotomy/biliary tree swept and stent placed in CBD. General Surgery consulted for evaluation  for cholecystectomy.     - Plan for OR tomorrow for lap cholecystectomy, consent obtained   - Diet as tolerated, NPO at MN 6/23  - No acute surgical intervention  - Trend labs  - Okay for DVT ppx, please hold anticoagulation  - Please call with questions        Poncho Alvarez MD  General Surgery  [facility] - Telemetry Stepdown

## 2023-06-22 NOTE — ASSESSMENT & PLAN NOTE
Mariann Huff is a 63 y.o. female PMH CAD with GINGER , HTN, HLD, DM2, MURTAZA (not on CPAP), PAD, Afib and hx of DVT on Eliquis, biliary pancreatitis admitted to [facility] on 6/9/2023 for biliary pancreatitis. Now s/p ERCP on 6/15/23 with sphincterotomy/biliary tree swept and stent placed in CBD. General Surgery consulted for evaluation for cholecystectomy.     - Plan for OR tomorrow for lap cholecystectomy, consent obtained   - Diet as tolerated, NPO at MN 6/23  - No acute surgical intervention  - Trend labs  - Okay for DVT ppx, please hold anticoagulation  - Please call with questions

## 2023-06-22 NOTE — ASSESSMENT & PLAN NOTE
- HHS on admission. Recent pancreatitis admission, not yet back to normal po intake with worsening hepatic function. Taking Glipizide and Farxiga at home. Never picked up Levemir Rx recently prescribed by her PCP    - 24 hour glucose trend: 80s-220s    - Decrease Levemir to 14 units nightly. She should still receive basal insulin even if she will be NPO for surgery.  -  Decrease Novolog to 6u units before meals + continue low dose correction Novolog prn - When NPO she should not receive scheduled prandial insulin but she should still be given sliding scale insulin if indicated based on BG  - Accuchecks ac/hs while diet ordered; please change order to q4h accuchecks when npo    - Hypoglycemia protocol   - If blood glucose greater than 300, please ask patient not to eat food or drink anything other than water until correctional insulin has brought it back below 250    **Please notify endocrine of change in diet or plans for NPO for surgery (or any other reason) as this may change her insulin requirement**

## 2023-06-22 NOTE — PROGRESS NOTES
[facility] - Telemetry Stepdown  Endocrinology  Progress Note    Admit Date: 2023     63 year old  female with type 2 diabetes mellitus, CKDIV, CAD, hypertension, hyperlipidemia, MURTAZA (not on CPAP), PAD, Afib and hx of DVT on Eliquis and recent admission for pancreatitis who presents with HHS and developing possible biliary obstruction.     - Patient recently admitted  to  for acute pancreatitis attributed to Trulicity, which was discontinued upon d/c. Per pt's PCP this was 2nd episode of pancreatitis while on trulicity -- prior episode when increasing dose of Trulicity.   Pt states upon d/c  she was feeling slight improvement but still having intermittent n/v and not tolerating normal diet. N/V began to get worse in last few days which prompted admission. No abdominal pain on presentation for current admission, unlike last admit when she had severe abd pain for pancreatitis.    In the ED patient afebrile and hemodynamically stable saturating well on room air. Lipase >2000. BG >700 and serum osmo 326 after 1L IVFs. B-hydroxybutyrate 1.3. pH 7.4 and bicarb 34. AG 12. Patient started on aggressive IVFs and insulin gtt for HHS.     - Endocrinology consulted for initial HHS and glucose management      Regarding Type 2 Diabetes Mellitus:    - Initially diagnosed with Type 2 diabetes mellitus: Over 20 years prior  - Home diabetic medications include: Farxiga 10mg qd, Glipizide ER 10mg daily, Levemir 20u qhs (but never started). She has been on MDI previously, most recently she was on Toujeo and Humalog discontinued 2021 but she states she was on insulin up until about 10mo ago.  - Family History: Not asked  - Weight based dosin kg x 0.4 (CKD) = 25 TDD x 0.5 = 12 basal / 12 prandial  - 1700/TDD = 68 (estimated insulin sensitivity factor)  - 450/TDD = 18 (estimated starting carb ratio for prandial dosing)    Lab Results   Component Value Date    HGBA1C 8.1 (H) 2023    HGBA1C 8.2  "(H) 2023    HGBA1C 8.8 (H) 2023    CPEPTIDE 1.36 2017     Lab Results   Component Value Date    EGFRNORACEVR 12.8 (A) 2023    EGFRNORACEVR 16.4 (A) 2023    EGFRNORACEVR 19.3 (A) 2023       Interval HPI:   Overnight events: BG 80s-220s last 24h. Planning for l/s cholecystectomy tomorrow.  Eatin-50%  Nausea: No  Hypoglycemia and intervention: No  Fever: No  TPN and/or TF: No  If yes, type of TF/TPN and rate: n/a    BP (!) 128/59 (BP Location: Right arm, Patient Position: Lying)   Pulse 79   Temp 99 °F (37.2 °C) (Oral)   Resp 18   Ht 5' 4" (1.626 m)   Wt 62.8 kg (138 lb 7.2 oz)   LMP  (LMP Unknown)   SpO2 95%   Breastfeeding No   BMI 23.76 kg/m²     Labs Reviewed and Include    Recent Labs   Lab 23  0505   GLU 83   CALCIUM 8.9   ALBUMIN 1.9*   PROT 6.8      K 3.3*   CO2 25      BUN 20   CREATININE 1.5*   ALKPHOS 887*   ALT 57*   AST 91*   BILITOT 1.6*     Lab Results   Component Value Date    WBC 16.42 (H) 2023    HGB 9.1 (L) 2023    HCT 29.2 (L) 2023    MCV 83 2023     2023     No results for input(s): TSH, FREET4 in the last 168 hours.  Lab Results   Component Value Date    HGBA1C 8.1 (H) 2023       Nutritional status:   Body mass index is 23.76 kg/m².  Lab Results   Component Value Date    ALBUMIN 1.9 (L) 2023    ALBUMIN 1.9 (L) 2023    ALBUMIN 1.9 (L) 2023     No results found for: PREALBUMIN    Estimated Creatinine Clearance: 33.6 mL/min (A) (based on SCr of 1.5 mg/dL (H)).    Accu-Checks  Recent Labs     23  0810 23  1132 23  1550 23  2127 23  0743 23  1133 23  1613 23  2052 23  0848 23  1134   POCTGLUCOSE 166* 203* 210* 204* 168* 225* 155* 100 88 140*       Current Medications and/or Treatments Impacting Glycemic Control  Immunotherapy:    Immunosuppressants       None          Steroids:   Hormones (From admission, onward) "      Start     Stop Route Frequency Ordered    06/09/23 2028  melatonin tablet 6 mg         -- Oral Nightly PRN 06/09/23 1931          Pressors:    Autonomic Drugs (From admission, onward)      None          Hyperglycemia/Diabetes Medications:   Antihyperglycemics (From admission, onward)      Start     Stop Route Frequency Ordered    06/22/23 2100  insulin detemir U-100 (Levemir) pen 14 Units         -- SubQ Nightly 06/22/23 0946    06/22/23 1130  insulin aspart U-100 pen 6 Units         -- SubQ 3 times daily with meals 06/22/23 1010    06/10/23 1315  insulin regular in 0.9 % NaCl 100 unit/100 mL (1 unit/mL) infusion        Question:  Enter initial dose (Units/hr):  Answer:  0.6    06/10 2200 IV Continuous 06/10/23 1211    06/10/23 1309  insulin aspart U-100 pen 0-5 Units         -- SubQ As needed (PRN) 06/10/23 1211            ASSESSMENT and PLAN    Cardiac/Vascular  Hyperlipidemia associated with type 2 diabetes mellitus  - On Lipitor 40mg and Repatha outpatient  - Management per primary    Hypertension associated with diabetes  On multiple anti-HTNs outpatient  Management per primary      Renal/  * ATN (acute tubular necrosis)  PAPA 2/2 ATN  Improving -- Cr continuing to improve -- this may account for increasing insulin requirements   Keep renal function in mind when adjusting insulin doses as renal clearance impacts insulin requirement       Endocrine  Type 2 diabetes mellitus with chronic kidney disease  - HHS on admission. Recent pancreatitis admission, not yet back to normal po intake with worsening hepatic function. Taking Glipizide and Farxiga at home. Never picked up Levemir Rx recently prescribed by her PCP    - 24 hour glucose trend: 80s-220s    - Decrease Levemir to 14 units nightly. She should still receive basal insulin even if she will be NPO for surgery.  -  Decrease Novolog to 6u units before meals + continue low dose correction Novolog prn - When NPO she should not receive scheduled prandial  insulin but she should still be given sliding scale insulin if indicated based on BG  - Accuchecks ac/hs while diet ordered; please change order to q4h accuchecks when npo    - Hypoglycemia protocol   - If blood glucose greater than 300, please ask patient not to eat food or drink anything other than water until correctional insulin has brought it back below 250    **Please notify endocrine of change in diet or plans for NPO for surgery (or any other reason) as this may change her insulin requirement**    GI  History of pancreatitis  - She has now had two episodes of pancreatitis on Trulicity, however dx of biliary pancreatitis per primary and GI based on eval this admission   - Episodes of pancreatitis likely multifactorial with GLP1 as contributing factor  - Laparoscopic cholecystectomy scheduled Friday 6/23   - Hold [facility] at discharge        Jerilyn Harrison MD  Endocrinology  [facility] - Telemetry Stepdown

## 2023-06-22 NOTE — PT/OT/SLP PROGRESS
Physical Therapy      Patient Name:  Mariann Huff   MRN:  [id number]    Patient not seen today secondary to pt politely refused due to not feeling well today. Pt asked if we could try tomorrow. Will follow-up as able.

## 2023-06-22 NOTE — ASSESSMENT & PLAN NOTE
- She has now had two episodes of pancreatitis on Trulicity, however dx of biliary pancreatitis per primary and GI based on eval this admission   - Episodes of pancreatitis likely multifactorial with GLP1 as contributing factor  - Laparoscopic cholecystectomy scheduled Friday 6/23   [facility]

## 2023-06-22 NOTE — NURSING
Glucose 88. Pt has 6 units of aspart scheduled. Pt does not want to eat breakfast. Notified Dr. Harrison. Orders to hold aspart for now.

## 2023-06-22 NOTE — SUBJECTIVE & OBJECTIVE
"Interval HPI:   Overnight events: BG 80s-220s last 24h. Planning for l/s cholecystectomy tomorrow.  Eatin-50%  Nausea: No  Hypoglycemia and intervention: No  Fever: No  TPN and/or TF: No  If yes, type of TF/TPN and rate: n/a    BP (!) 128/59 (BP Location: Right arm, Patient Position: Lying)   Pulse 79   Temp 99 °F (37.2 °C) (Oral)   Resp 18   Ht 5' 4" (1.626 m)   Wt 62.8 kg (138 lb 7.2 oz)   LMP  (LMP Unknown)   SpO2 95%   Breastfeeding No   BMI 23.76 kg/m²     Labs Reviewed and Include    Recent Labs   Lab 23  0505   GLU 83   CALCIUM 8.9   ALBUMIN 1.9*   PROT 6.8      K 3.3*   CO2 25      BUN 20   CREATININE 1.5*   ALKPHOS 887*   ALT 57*   AST 91*   BILITOT 1.6*     Lab Results   Component Value Date    WBC 16.42 (H) 2023    HGB 9.1 (L) 2023    HCT 29.2 (L) 2023    MCV 83 2023     2023     No results for input(s): TSH, FREET4 in the last 168 hours.  Lab Results   Component Value Date    HGBA1C 8.1 (H) 2023       Nutritional status:   Body mass index is 23.76 kg/m².  Lab Results   Component Value Date    ALBUMIN 1.9 (L) 2023    ALBUMIN 1.9 (L) 2023    ALBUMIN 1.9 (L) 2023     No results found for: PREALBUMIN    Estimated Creatinine Clearance: 33.6 mL/min (A) (based on SCr of 1.5 mg/dL (H)).    Accu-Checks  Recent Labs     23  0810 23  1132 23  1550 23  2127 23  0743 23  1133 23  1613 23  2052 23  0848 23  1134   POCTGLUCOSE 166* 203* 210* 204* 168* 225* 155* 100 88 140*       Current Medications and/or Treatments Impacting Glycemic Control  Immunotherapy:    Immunosuppressants       None          Steroids:   Hormones (From admission, onward)      Start     Stop Route Frequency Ordered    23  melatonin tablet 6 mg         -- Oral Nightly PRN 23 193          Pressors:    Autonomic Drugs (From admission, onward)      None      "     Hyperglycemia/Diabetes Medications:   Antihyperglycemics (From admission, onward)      Start     Stop Route Frequency Ordered    06/22/23 2100  insulin detemir U-100 (Levemir) pen 14 Units         -- SubQ Nightly 06/22/23 0946    06/22/23 1130  insulin aspart U-100 pen 6 Units         -- SubQ 3 times daily with meals 06/22/23 1010    06/10/23 1315  insulin regular in 0.9 % NaCl 100 unit/100 mL (1 unit/mL) infusion        Question:  Enter initial dose (Units/hr):  Answer:  0.6    06/10 2200 IV Continuous 06/10/23 1211    06/10/23 1309  insulin aspart U-100 pen 0-5 Units         -- SubQ As needed (PRN) 06/10/23 1211

## 2023-06-22 NOTE — ASSESSMENT & PLAN NOTE
PAPA 2/2 ATN  Improving -- Cr continuing to improve -- this may account for increasing insulin requirements   Keep renal function in mind when adjusting insulin doses as renal clearance impacts insulin requirement

## 2023-06-22 NOTE — ANESTHESIA PREPROCEDURE EVALUATION
[facility]  Anesthesia Pre-Operative Evaluation         Patient Name: Mariann Huff  YOB: 1960  MRN: [id number]    SUBJECTIVE:     Pre-operative evaluation for Procedure(s) (LRB):  CHOLECYSTECTOMY, LAPAROSCOPIC (N/A)  Scheduled for 6/23/2023    HPI 06/22/2023:  Mariann Huff is a 63 y.o. female with hx of HTN, CAD s/p remote GINGER, T2DM, MURTAZA no CPAP, PAD, Afib, Hx of DVT on eliquis admitted with gallstone pancreatitis. Presents for above procedure.    Prev airway:     Mask Ventilation:  Easy with oral airway    Attempts:  3    Attempted By:  Resident anesthesiologist    Method of Intubation:  Direct    Blade:  Eufemia 3    Laryngeal View Grade: Grade IIb - only the arytenoids and epiglottis seen      Attempted By (2nd Attempt):  Resident anesthesiologist    Method of Intubation (2nd Attempt):  Direct    Blade (2nd Attempt):  Shahid 2    Laryngeal View Grade (2nd Attempt): Grade IIb - only the arytenoids and epiglottis seen      Attempted By (3rd Attempt):  Staff anesthesiologist    Method of Intubation (3rd Attempt):  Direct    Blade (3rd Attempt):  Shahid 2    Laryngeal View Grade (3rd Attempt): Grade IIa - cords partially seen      Difficult Airway Encountered?: No      Complications:  None    Airway Device:  Oral endotracheal tube    Airway Device Size:  7.0    Style/Cuff Inflation:  Cuffed    Inflation Amount (mL):  6    Tube secured:  23    Secured at:  The lips    Placement Verified By:  Capnometry    Complicating Factors:  Anterior larynx, small mouth and short neck    Oxygen/Ventilation Requirements:  On room air           Patient Active Problem List   Diagnosis    Hypertension associated with diabetes    Hyperlipidemia associated with type 2 diabetes mellitus    CAD (coronary artery disease)    Sleep apnea    Carotid stenosis, bilateral    History of non-ST elevation myocardial infarction (NSTEMI)    PAPA (acute kidney injury)    S/P coronary artery stent  placement    Nuclear sclerosis - Right Eye    Primary localized osteoarthrosis, lower leg    Chronic sinusitis    PSC (posterior subcapsular cataract), right    DME (diabetic macular edema)    Refractive error    Pseudophakia    Type 2 diabetes mellitus with diabetic peripheral angiopathy without gangrene    Diabetic peripheral neuropathy associated with type 2 diabetes mellitus    Cervical arthritis    Neurogenic claudication    Lumbar herniated disc    Fatty liver disease, nonalcoholic    Arthritis of lumbar spine    Chronic pain    Fusion of spine, lumbar region    Lumbosacral radiculopathy at L5    Acute encephalopathy    Type 2 diabetes mellitus with stage 2 chronic kidney disease and hypertension    Asthma    Bradycardia    Delayed surgical wound healing    Rectal ulcer    Palliative care encounter    Acute deep vein thrombosis (DVT) of femoral vein of right lower extremity    Impaired functional mobility and endurance    (HFpEF) heart failure with preserved ejection fraction    Alteration in skin integrity    Debility    Left ureteral injury    Hydronephrosis    Serum albumin decreased    Weakness of both lower extremities    Poor balance    Type 2 diabetes mellitus with retinopathy, with long-term current use of insulin    Hypertensive retinopathy, bilateral    SI (sacroiliac) joint dysfunction    S/P ureteral reimplantation    Left flank pain    Complex renal cyst    Claudication of both lower extremities    PAD (peripheral artery disease)    Compression fracture of spine    Nontraumatic compression fracture of T12 vertebra    Bilateral renal cysts    Chronic kidney disease, stage 3a    Acute pancreatitis    Type 2 diabetes mellitus with chronic kidney disease    History of DVT (deep vein thrombosis)    History of pancreatitis    Hyperbilirubinemia    Hypokalemia    ATN (acute tubular necrosis)    Hyperosmolar hyperglycemic state (HHS)    Hyperkalemia     Hypophosphatemia    Discharge planning       Review of patient's allergies indicates:   Allergen Reactions    Milk containing products (dairy)      Causes GI distress       Outpatient Medications:  Current Facility-Administered Medications on File Prior to Encounter   Medication Dose Route Frequency Provider Last Rate Last Admin    0.9%  NaCl infusion   Intravenous Continuous iAme George  mL/hr at 10/27/22 0842 New Bag at 10/27/22 0842     Current Outpatient Medications on File Prior to Encounter   Medication Sig Dispense Refill    amLODIPine (NORVASC) 10 MG tablet Take 1 tablet (10 mg total) by mouth once daily. 90 tablet 2    aspirin 81 mg Tab Take 81 mg by mouth once daily. 1 Tablet Oral Every day      atorvastatin (LIPITOR) 40 MG tablet Take 1 tablet (40 mg total) by mouth every evening. 90 tablet 3    dapagliflozin (FARXIGA) 10 mg tablet Take 1 tablet (10 mg total) by mouth once daily. 90 tablet 3    diclofenac sodium (VOLTAREN) 1 % Gel Apply 2 g topically 3 (three) times daily. Apply to the area of pain 2-3x per day or night as needed 100 g 3    EScitalopram oxalate (LEXAPRO) 10 MG tablet Take 1 tablet (10 mg total) by mouth once daily. 90 tablet 2    gabapentin (NEURONTIN) 300 MG capsule Take 1 capsule (300 mg) in the morning and 2 capsules (600 mg) in the evening 90 capsule 0    glipiZIDE (GLUCOTROL) 10 MG TR24 Take 1 tablet (10 mg total) by mouth daily with breakfast. 90 tablet 3    isosorbide mononitrate (ISMO,MONOKET) 10 mg tablet Take 1 tablet (10 mg total) by mouth once daily. 90 tablet 2    telmisartan (MICARDIS) 80 MG Tab Take 1 tablet (80 mg total) by mouth once daily. Stop losartan 90 tablet 2    ACCU-CHEK EDIN PLUS METER Misc       albuterol (PROVENTIL/VENTOLIN HFA) 90 mcg/actuation inhaler Inhale 2 puffs into the lungs every 6 (six) hours as needed for Wheezing. 1 g 3    blood sugar diagnostic (ACCU-CHEK EDIN PLUS TEST STRP) Strp TEST BLOOD SUGARS 4 TIMES DAILY  "150 strip 11    cilostazoL (PLETAL) 100 MG Tab Take 1 tablet (100 mg total) by mouth 2 (two) times daily. (Patient not taking: Reported on 2023) 60 tablet 11    insulin detemir U-100, Levemir, (LEVEMIR FLEXPEN) 100 unit/mL (3 mL) InPn pen Inject 20 Units into the skin every evening. 18 mL 3    multivitamin (THERAGRAN) per tablet Take 1 tablet by mouth once daily.      pen needle, diabetic (NOVOTWIST) 32 gauge x 1/5" Ndle Use 1 needle to inject insulin four times daily 400 each 2        Current Inpatient Medications:   amLODIPine  10 mg Oral Daily    aspirin  81 mg Oral Daily    enoxparin  1 mg/kg Subcutaneous Q24H (treatment, non-standard time)    famotidine  20 mg Oral Daily    hydrALAZINE  25 mg Oral Q12H    insulin aspart U-100  6 Units Subcutaneous TIDWM    insulin detemir U-100  16 Units Subcutaneous QHS    isosorbide dinitrate  10 mg Oral BID    piperacillin-tazobactam (Zosyn) IV (PEDS and ADULTS) (extended infusion is not appropriate)  4.5 g Intravenous Q8H       Past Surgical History:   Procedure Laterality Date    ANTEGRADE NEPHROSTOGRAPHY Left 2019    Procedure: Nephrostogram - antegrade;  Surgeon: Robin Boyd MD;  Location: [facility] OR [address] [place] [address];  Service: Urology;  Laterality: Left;    BRONCHOSCOPY N/A 2019    Procedure: BRONCHOSCOPY;  Surgeon: Saen Ruano MD;  Location: [facility] OR [address] [place] [address];  Service: General;  Laterality: N/A;    CARDIAC CATHETERIZATION      CATARACT EXTRACTION      cataract extraction left eye      cataracts      CAUDAL EPIDURAL STEROID INJECTION N/A 2019    Procedure: Injection-steroid-epidural-caudal;  Surgeon: Dave Bentley Jr., MD;  Location: [facility] PAIN MGT;  Service: Pain Management;  Laterality: N/A;     SECTION, LOW TRANSVERSE      COLONOSCOPY N/A 2019    Procedure: COLONOSCOPY;  Surgeon: lA Alaniz MD;  Location: [facility] ENDO ([address] [place] [address]);  Service: Endoscopy;  Laterality: N/A;    CORONARY ANGIOPLASTY      CYSTOGRAM N/A " 12/11/2019    Procedure: CYSTOGRAM INTRAOP;  Surgeon: Robin Boyd MD;  Location: [facility] OR 2ND FLR;  Service: Urology;  Laterality: N/A;  1 HOUR    CYSTOSCOPY W/ RETROGRADES Left 12/11/2019    Procedure: CYSTOSCOPY, WITH RETROGRADE PYELOGRAM;  Surgeon: Robin Boyd MD;  [facility];  Service: Urology;  Laterality: Left;  fluro    ENDOSCOPIC ULTRASOUND OF UPPER GASTROINTESTINAL TRACT N/A 6/15/2023    Procedure: ULTRASOUND, UPPER GI TRACT, ENDOSCOPIC;  Surgeon: Lisa Kim MD;  Location: [facility] ENDO (2ND FLR);  Service: Endoscopy;  Laterality: N/A;    ERCP N/A 6/15/2023    Procedure: ERCP (ENDOSCOPIC RETROGRADE CHOLANGIOPANCREATOGRAPHY);  Surgeon: Lisa Kim MD;  Location: [facility] ENDO (2ND FLR);  Service: Endoscopy;  Laterality: N/A;    ESOPHAGOGASTRODUODENOSCOPY W/ PEG  5/2/2019    Procedure: EGD, WITH PEG TUBE INSERTION;  Surgeon: Sean Ruano MD;  [facility];  Service: General;;    EXCISION TURBINATE, SUBMUCOUS      FLEXIBLE SIGMOIDOSCOPY N/A 5/13/2019    Procedure: SIGMOIDOSCOPY, FLEXIBLE;  Surgeon: ALBERTO Amin MD;  Location: [facility] ENDO (2ND FLR);  Service: Endoscopy;  Laterality: N/A;    FLEXIBLE SIGMOIDOSCOPY N/A 5/21/2019    Procedure: SIGMOIDOSCOPY, FLEXIBLE;  Surgeon: ALBERTO Amin MD;  [facility]  Service: Endoscopy;  Laterality: N/A;    FUSION OF LUMBAR SPINE BY ANTERIOR APPROACH Left 4/12/2019    Procedure: FUSION, SPINE, LUMBAR, ANTERIOR APPROACH Left L5-S1 Anterior to Psoa Interbody Fusion, L5-S1 Posterior Instrumentation;  Surgeon: Mk George MD;  [facility];  Service: Neurosurgery;  Laterality: Left;  Porcedure:  Left L5-S1 Anterior to Psoa Interbody Fusion, L5-S1 Posterior Instrumentation  Surgery Time: 4 Hrs  LOS: 2-3  Anesthesia: General   Blood: Type & Screen  R    HAND SURGERY Left     HAND SURGERY Right     torn ligament repair/ Dr. Yeboah    HYSTERECTOMY      INJECTION OF STEROID Right  12/10/2020    Procedure: INJECTION, STEROID Right SI Joint Block and Steroid Injection;  Surgeon: Mk George MD;  Location: [facility] OR;  Service: Neurosurgery;  Laterality: Right;  Procedure: Right SI Joint Block and Steroid Injection   SUrgery Time: 30 Min  LOS: 0  Anesthesia: MAC  Radiology: C-arm  Bed: Tomer 4 Poster  Position: Prone    INJECTION OF STEROID Right 9/28/2021    Procedure: INJECTION, STEROID Right SI joint block & steroid injection;  Surgeon: Mk George MD;  Location: [facility] OR;  Service: Neurosurgery;  Laterality: Right;  Procedure: Right SI joint block & steroid injection  Surgery Time: 30m  Anesthesia: Local MAC  Radiology: C-arm  Bed: Regular  Position: Prone    left foot surgery      left wrist surgery      LYSIS OF ADHESIONS N/A 2/19/2020    Procedure: LYSIS, ADHESIONS;  Surgeon: Robin Boyd MD;  Location: [address];  Service: Urology;  Laterality: N/A;    NASAL SEPTUM SURGERY  5/7/15    PERCUTANEOUS NEPHROSTOMY Left 4/21/2019    Procedure: Creation, Nephrostomy, Percutaneous;  Surgeon: Karina Surgeon;  [facility];  Service: Anesthesiology;  Laterality: Left;    REPAIR OF URETER  4/12/2019    Procedure: REPAIR, URETER;  Surgeon: Mk George MD;  Location: [address];  Service: Neurosurgery;;    REPLACEMENT OF NEPHROSTOMY TUBE N/A 7/18/2019    Procedure: REPLACEMENT, NEPHROSTOMY TUBE;  Surgeon: [facility] Diagnostic Provider;  Location: [address];  Service: Anesthesiology;  Laterality: N/A;  188    REPLACEMENT OF NEPHROSTOMY TUBE N/A 7/24/2019    Procedure: REPLACEMENT, NEPHROSTOMY TUBE;  Surgeon: [facility] Diagnostic Provider;  [address]  Service: Anesthesiology;  Laterality: N/A;  188    REPLACEMENT OF NEPHROSTOMY TUBE N/A 10/7/2019    Procedure: REPLACEMENT, NEPHROSTOMY TUBE;  Surgeon: [facility] Diagnostic Provider;  [address];  Service: Anesthesiology;  Laterality: N/A;  189    REPLACEMENT OF NEPHROSTOMY TUBE N/A  11/25/2019    Procedure: REPLACEMENT, NEPHROSTOMY TUBE;  Surgeon: [place] [facility] Provider;  [facility];  Service: Anesthesiology;  Laterality: N/A;  Room 188/Bessy    REPLACEMENT OF NEPHROSTOMY TUBE Right 2/19/2020    Procedure: REPLACEMENT, NEPHROSTOMY TUBE removal removal;  Surgeon: Robin Boyd MD;  [facility];  Service: Urology;  Laterality: Right;    rt elbow surgery      S/P LAD COATED STENT  05/14/2010    6 total     S/P OM1 STENT  08/2003    SINUS SURGERY      F.E.S.S.    TRACHEOSTOMY N/A 5/2/2019    Procedure: CREATION, TRACHEOSTOMY;  Surgeon: Sean Ruano MD;  Location: [facility] OR [address] [place] [address];  Service: General;  Laterality: N/A;    TUBAL LIGATION      URETERAL REIMPLANTION Left 2/19/2020    Procedure: REIMPLANTATION, URETER WITH PSOAS HITCH;  Surgeon: Robin Boyd MD;  Location: [facility] OR [address] [place] [address];  Service: Urology;  Laterality: Left;       Social History     Socioeconomic History    Marital status:      Spouse name: Shamir    Number of children: 2   Occupational History    Occupation: cafeteria     Employer: [employer]     Employer: [employer]     Employer: [employer]   Tobacco Use    Smoking status: Never    Smokeless tobacco: Never   Substance and Sexual Activity    Alcohol use: No     Alcohol/week: 0.0 standard drinks    Drug use: No    Sexual activity: Yes     Partners: Male     Birth control/protection: Post-menopausal     Comment:    Other Topics Concern    Are you pregnant or think you may be? No    Breast-feeding No   Social History Narrative    . 2 children.      Social Determinants of Health     Financial Resource Strain: Low Risk     Difficulty of Paying Living Expenses: Not hard at all   Food Insecurity: No Food Insecurity    Worried About Running Out of Food in the Last Year: Never true    Ran Out of Food in the Last Year: Never true   Transportation Needs: No Transportation Needs     Lack of Transportation (Medical): No    Lack of Transportation (Non-Medical): No   Physical Activity: Sufficiently Active    Days of Exercise per Week: 4 days    Minutes of Exercise per Session: 60 min   Stress: Stress Concern Present    Feeling of Stress : To some extent   Social Connections: Moderately Isolated    Frequency of Communication with Friends and Family: Once a week    Frequency of Social Gatherings with Friends and Family: Once a week    Attends Yazdanism Services: More than 4 times per year    Active Member of Clubs or Organizations: No    Attends Club or Organization Meetings: Never    Marital Status:    Housing Stability: Low Risk     Unable to Pay for Housing in the Last Year: No    Number of Places Lived in the Last Year: 1    Unstable Housing in the Last Year: No       OBJECTIVE:     Weight:  Wt Readings from Last 1 Encounters:   06/09/23 62.8 kg (138 lb 7.2 oz)     Body mass index is 23.76 kg/m².    Recent Blood Pressure Readings:  BP Readings from Last 3 Encounters:   06/22/23 139/67   05/31/23 132/76   05/26/23 (!) 106/56       Vital Signs Range (Last 24H):  Temp:  [36.9 °C (98.4 °F)-37.5 °C (99.5 °F)]   Pulse:  [71-90]   Resp:  [16-19]   BP: (136-181)/(67-84)   SpO2:  [95 %-98 %]       CBC:   Lab Results   Component Value Date    WBC 16.42 (H) 06/22/2023    HGB 9.1 (L) 06/22/2023    HCT 29.2 (L) 06/22/2023    MCV 83 06/22/2023     06/22/2023       CMP:     Chemistry        Component Value Date/Time     06/22/2023 0505    K 3.3 (L) 06/22/2023 0505     06/22/2023 0505    CO2 25 06/22/2023 0505    BUN 20 06/22/2023 0505    CREATININE 1.5 (H) 06/22/2023 0505    GLU 83 06/22/2023 0505        Component Value Date/Time    CALCIUM 8.9 06/22/2023 0505    ALKPHOS 887 (H) 06/22/2023 0505    AST 91 (H) 06/22/2023 0505    ALT 57 (H) 06/22/2023 0505    BILITOT 1.6 (H) 06/22/2023 0505    ESTGFRAFRICA >60.0 06/14/2022 1023    EGFRNONAA 54.3 (A) 06/14/2022 1023             INR:  Lab Results   Component Value Date    INR 1.2 06/13/2023    INR 0.9 10/17/2022    INR 0.9 11/25/2019       Diagnostic Studies:      EKG:    Results for orders placed or performed during the hospital encounter of 06/09/23   EKG 12-lead    Collection Time: 06/12/23  8:18 AM    Narrative    Test Reason : E87.5,    Vent. Rate : 077 BPM     Atrial Rate : 077 BPM     P-R Int : 164 ms          QRS Dur : 064 ms      QT Int : 392 ms       P-R-T Axes : 043 -28 -11 degrees     QTc Int : 443 ms    Normal sinus rhythm  Nonspecific ST-T abn  Borderline Abnormal ECG  When compared with ECG of 23-AUG-2022 09:43,  Criteria for Septal infarct are no longer Present  T wave inversion now evident in Inferior leads  Nonspecific T wave abnormality, improved in Lateral leads  Confirmed by Peter BROWN MD ([id number]) on 6/12/2023 9:08:44 AM    Referred By: [email or web address]   SELF           Confirmed By:Peter BROWN MD       2D Echo:    No results found. However, due to the size of the patient record, not all encounters were searched. Please check Results Review for a complete set of results.    Results for orders placed or performed during the hospital encounter of 05/25/23   Echo   Result Value Ref Range    BSA 1.79 m2    TDI SEPTAL 0.06 m/s    LV LATERAL E/E' RATIO 9.43 m/s    LV SEPTAL E/E' RATIO 11.00 m/s    LA WIDTH 3.64 cm    TDI LATERAL 0.07 m/s    LVIDd 5.17 3.5 - 6.0 cm    IVS 0.89 0.6 - 1.1 cm    Posterior Wall 0.86 0.6 - 1.1 cm    LVIDs 3.64 2.1 - 4.0 cm    FS 30 28 - 44 %    LA volume 56.82 cm3    Sinus 2.80 cm    STJ 2.38 cm    Ascending aorta 2.90 cm    LV mass 161.33 g    LA size 3.85 cm    RVDD 2.87 cm    TAPSE 1.61 cm    Left Ventricle Relative Wall Thickness 0.33 cm    AV mean gradient 3 mmHg    AV valve area 2.40 cm2    AV Velocity Ratio 0.82     AV index (prosthetic) 0.75     MV valve area p 1/2 method 4.65 cm2    E/A ratio 0.89     Mean e' 0.07 m/s    E wave deceleration time 163.28 msec    LVOT diameter 2.02 cm    LVOT  area 3.2 cm2    LVOT peak canelo 0.91 m/s    LVOT peak VTI 18.56 cm    Ao peak canelo 1.11 m/s    Ao VTI 24.73 cm    LVOT stroke volume 59.45 cm3    AV peak gradient 5 mmHg    E/E' ratio 10.15 m/s    MV Peak E Canelo 0.66 m/s    MV stenosis pressure 1/2 time 47.35 ms    MV Peak A Canelo 0.74 m/s    LV Systolic Volume 56.03 mL    LV Systolic Volume Index 31.8 mL/m2    LV Diastolic Volume 128.05 mL    LV Diastolic Volume Index 72.76 mL/m2    LA Volume Index 32.3 mL/m2    LV Mass Index 92 g/m2    RA Major Axis 3.89 cm    Left Atrium Minor Axis 4.79 cm    Left Atrium Major Axis 4.75 cm    LA Volume Index (Mod) 26.3 mL/m2    LA volume (mod) 46.30 cm3    RA Width 2.66 cm    Right Atrial Pressure (from IVC) 3 mmHg    QEF 52 %    EF 55 %    Narrative    · The estimated ejection fraction is 55%.  · The quantitatively derived ejection fraction is 52%.  · Normal right ventricular size with normal right ventricular systolic   function.  · Normal left ventricular diastolic function.  · The left ventricle is normal in size with normal systolic function.  · Normal central venous pressure (3 mmHg).          No results found. However, due to the size of the patient record, not all encounters were searched. Please check Results Review for a complete set of results.      ASSESSMENT/PLAN:           Pre-op Assessment    I have reviewed the Patient Summary Reports.    I have reviewed the NPO Status.      Review of Systems  Social:  No Alcohol Use, Non-Smoker    Cardiovascular:   Hypertension Past MI CAD  CABG/stent  CHF    Pulmonary:   Denies COPD. Asthma    Hepatic/GI:   PUD,    Musculoskeletal:   Arthritis     Neurological:   Neuromuscular Disease,    Endocrine:   Diabetes  Denies Obesity / BMI > 30      Physical Exam  General: Well nourished, Cooperative, Alert and Oriented    Airway:  Mallampati: II   Mouth Opening: Normal  TM Distance: Normal  Tongue: Normal  Neck ROM: Normal ROM    Dental:  Intact        Anesthesia Plan  Type of Anesthesia,  risks & benefits discussed:    Anesthesia Type: Gen ETT  Intra-op Monitoring Plan: Standard ASA Monitors  Post Op Pain Control Plan: multimodal analgesia and IV/PO Opioids PRN  Induction:  IV  Airway Plan: Video and Direct  Informed Consent: Informed consent signed with the Patient and all parties understand the risks and agree with anesthesia plan.  All questions answered.   ASA Score: 3  Day of Surgery Review of History & Physical: H&P Update referred to the surgeon/provider.    Ready For Surgery From Anesthesia Perspective.     .

## 2023-06-22 NOTE — PLAN OF CARE
Plan of care reviewed with pt. Pt [id number]. Cholestectomy will be done tomorrow, pt will be NPO after mn. Pt tolerating food with no n/v today. VSS. Pt remained free of falls, trauma, and injury. Will continue to monitor

## 2023-06-23 ENCOUNTER — ANESTHESIA (OUTPATIENT)
Dept: SURGERY | Facility: HOSPITAL | Age: 63
DRG: 987 | End: 2023-06-23
Payer: COMMERCIAL

## 2023-06-23 LAB
ALBUMIN SERPL BCP-MCNC: 1.8 G/DL (ref 3.5–5.2)
ALP SERPL-CCNC: 810 U/L (ref 55–135)
ALT SERPL W/O P-5'-P-CCNC: 50 U/L (ref 10–44)
ANION GAP SERPL CALC-SCNC: 11 MMOL/L (ref 8–16)
AST SERPL-CCNC: 75 U/L (ref 10–40)
BASOPHILS # BLD AUTO: 0.08 K/UL (ref 0–0.2)
BASOPHILS NFR BLD: 0.5 % (ref 0–1.9)
BILIRUB SERPL-MCNC: 1.4 MG/DL (ref 0.1–1)
BUN SERPL-MCNC: 18 MG/DL (ref 8–23)
CALCIUM SERPL-MCNC: 9 MG/DL (ref 8.7–10.5)
CHLORIDE SERPL-SCNC: 105 MMOL/L (ref 95–110)
CO2 SERPL-SCNC: 26 MMOL/L (ref 23–29)
CREAT SERPL-MCNC: 1.7 MG/DL (ref 0.5–1.4)
DIFFERENTIAL METHOD: ABNORMAL
EOSINOPHIL # BLD AUTO: 0.3 K/UL (ref 0–0.5)
EOSINOPHIL NFR BLD: 2.1 % (ref 0–8)
ERYTHROCYTE [DISTWIDTH] IN BLOOD BY AUTOMATED COUNT: 15.5 % (ref 11.5–14.5)
EST. GFR  (NO RACE VARIABLE): 33.7 ML/MIN/1.73 M^2
GLUCOSE SERPL-MCNC: 172 MG/DL (ref 70–110)
HCT VFR BLD AUTO: 27.6 % (ref 37–48.5)
HGB BLD-MCNC: 8.6 G/DL (ref 12–16)
IMM GRANULOCYTES # BLD AUTO: 0.23 K/UL (ref 0–0.04)
IMM GRANULOCYTES NFR BLD AUTO: 1.4 % (ref 0–0.5)
LYMPHOCYTES # BLD AUTO: 2 K/UL (ref 1–4.8)
LYMPHOCYTES NFR BLD: 12.2 % (ref 18–48)
MAGNESIUM SERPL-MCNC: 1.6 MG/DL (ref 1.6–2.6)
MCH RBC QN AUTO: 26.2 PG (ref 27–31)
MCHC RBC AUTO-ENTMCNC: 31.2 G/DL (ref 32–36)
MCV RBC AUTO: 84 FL (ref 82–98)
MONOCYTES # BLD AUTO: 1.3 K/UL (ref 0.3–1)
MONOCYTES NFR BLD: 8 % (ref 4–15)
NEUTROPHILS # BLD AUTO: 12.1 K/UL (ref 1.8–7.7)
NEUTROPHILS NFR BLD: 75.8 % (ref 38–73)
NRBC BLD-RTO: 0 /100 WBC
PHOSPHATE SERPL-MCNC: 2.2 MG/DL (ref 2.7–4.5)
PLATELET # BLD AUTO: 298 K/UL (ref 150–450)
PMV BLD AUTO: 11.1 FL (ref 9.2–12.9)
POCT GLUCOSE: 165 MG/DL (ref 70–110)
POCT GLUCOSE: 182 MG/DL (ref 70–110)
POCT GLUCOSE: 183 MG/DL (ref 70–110)
POCT GLUCOSE: 259 MG/DL (ref 70–110)
POCT GLUCOSE: 283 MG/DL (ref 70–110)
POTASSIUM SERPL-SCNC: 4.2 MMOL/L (ref 3.5–5.1)
PROT SERPL-MCNC: 6.7 G/DL (ref 6–8.4)
RBC # BLD AUTO: 3.28 M/UL (ref 4–5.4)
SODIUM SERPL-SCNC: 142 MMOL/L (ref 136–145)
WBC # BLD AUTO: 16.01 K/UL (ref 3.9–12.7)

## 2023-06-23 PROCEDURE — 63600175 PHARM REV CODE 636 W HCPCS: Performed by: FAMILY MEDICINE

## 2023-06-23 PROCEDURE — 27000221 HC OXYGEN, UP TO 24 HOURS

## 2023-06-23 PROCEDURE — 27201423 OPTIME MED/SURG SUP & DEVICES STERILE SUPPLY: Performed by: STUDENT IN AN ORGANIZED HEALTH CARE EDUCATION/TRAINING PROGRAM

## 2023-06-23 PROCEDURE — 63600175 PHARM REV CODE 636 W HCPCS: Performed by: INTERNAL MEDICINE

## 2023-06-23 PROCEDURE — 88304 TISSUE EXAM BY PATHOLOGIST: CPT | Mod: 26,,, | Performed by: PATHOLOGY

## 2023-06-23 PROCEDURE — 25000003 PHARM REV CODE 250: Performed by: INTERNAL MEDICINE

## 2023-06-23 PROCEDURE — 88304 TISSUE EXAM BY PATHOLOGIST: CPT | Performed by: PATHOLOGY

## 2023-06-23 PROCEDURE — 99233 PR SUBSEQUENT HOSPITAL CARE,LEVL III: ICD-10-PCS | Mod: GT,,, | Performed by: INTERNAL MEDICINE

## 2023-06-23 PROCEDURE — 99233 PR SUBSEQUENT HOSPITAL CARE,LEVL III: ICD-10-PCS | Mod: 57,,, | Performed by: STUDENT IN AN ORGANIZED HEALTH CARE EDUCATION/TRAINING PROGRAM

## 2023-06-23 PROCEDURE — D9220A PRA ANESTHESIA: Mod: ,,, | Performed by: ANESTHESIOLOGY

## 2023-06-23 PROCEDURE — 88304 PR  SURG PATH,LEVEL III: ICD-10-PCS | Mod: 26,,, | Performed by: PATHOLOGY

## 2023-06-23 PROCEDURE — 37000008 HC ANESTHESIA 1ST 15 MINUTES: Performed by: STUDENT IN AN ORGANIZED HEALTH CARE EDUCATION/TRAINING PROGRAM

## 2023-06-23 PROCEDURE — 80053 COMPREHEN METABOLIC PANEL: CPT | Performed by: STUDENT IN AN ORGANIZED HEALTH CARE EDUCATION/TRAINING PROGRAM

## 2023-06-23 PROCEDURE — 71000015 HC POSTOP RECOV 1ST HR: Performed by: STUDENT IN AN ORGANIZED HEALTH CARE EDUCATION/TRAINING PROGRAM

## 2023-06-23 PROCEDURE — 25000003 PHARM REV CODE 250

## 2023-06-23 PROCEDURE — 63600175 PHARM REV CODE 636 W HCPCS: Performed by: STUDENT IN AN ORGANIZED HEALTH CARE EDUCATION/TRAINING PROGRAM

## 2023-06-23 PROCEDURE — 63600175 PHARM REV CODE 636 W HCPCS

## 2023-06-23 PROCEDURE — 82962 GLUCOSE BLOOD TEST: CPT | Performed by: STUDENT IN AN ORGANIZED HEALTH CARE EDUCATION/TRAINING PROGRAM

## 2023-06-23 PROCEDURE — 25000003 PHARM REV CODE 250: Performed by: STUDENT IN AN ORGANIZED HEALTH CARE EDUCATION/TRAINING PROGRAM

## 2023-06-23 PROCEDURE — 99233 SBSQ HOSP IP/OBS HIGH 50: CPT | Mod: GT,,, | Performed by: INTERNAL MEDICINE

## 2023-06-23 PROCEDURE — 36000709 HC OR TIME LEV III EA ADD 15 MIN: Performed by: STUDENT IN AN ORGANIZED HEALTH CARE EDUCATION/TRAINING PROGRAM

## 2023-06-23 PROCEDURE — 83735 ASSAY OF MAGNESIUM: CPT | Performed by: FAMILY MEDICINE

## 2023-06-23 PROCEDURE — 25000003 PHARM REV CODE 250: Performed by: HOSPITALIST

## 2023-06-23 PROCEDURE — 71000033 HC RECOVERY, INTIAL HOUR: Performed by: STUDENT IN AN ORGANIZED HEALTH CARE EDUCATION/TRAINING PROGRAM

## 2023-06-23 PROCEDURE — 99900035 HC TECH TIME PER 15 MIN (STAT)

## 2023-06-23 PROCEDURE — 36000708 HC OR TIME LEV III 1ST 15 MIN: Performed by: STUDENT IN AN ORGANIZED HEALTH CARE EDUCATION/TRAINING PROGRAM

## 2023-06-23 PROCEDURE — 84100 ASSAY OF PHOSPHORUS: CPT | Performed by: FAMILY MEDICINE

## 2023-06-23 PROCEDURE — D9220A PRA ANESTHESIA: ICD-10-PCS | Mod: ,,, | Performed by: ANESTHESIOLOGY

## 2023-06-23 PROCEDURE — 47562 PR LAP,CHOLECYSTECTOMY: ICD-10-PCS | Mod: 22,,, | Performed by: STUDENT IN AN ORGANIZED HEALTH CARE EDUCATION/TRAINING PROGRAM

## 2023-06-23 PROCEDURE — 85025 COMPLETE CBC W/AUTO DIFF WBC: CPT | Performed by: FAMILY MEDICINE

## 2023-06-23 PROCEDURE — 37000009 HC ANESTHESIA EA ADD 15 MINS: Performed by: STUDENT IN AN ORGANIZED HEALTH CARE EDUCATION/TRAINING PROGRAM

## 2023-06-23 PROCEDURE — 20600001 HC STEP DOWN PRIVATE ROOM

## 2023-06-23 PROCEDURE — 71000016 HC POSTOP RECOV ADDL HR: Performed by: STUDENT IN AN ORGANIZED HEALTH CARE EDUCATION/TRAINING PROGRAM

## 2023-06-23 PROCEDURE — 94761 N-INVAS EAR/PLS OXIMETRY MLT: CPT

## 2023-06-23 PROCEDURE — 36415 COLL VENOUS BLD VENIPUNCTURE: CPT | Performed by: FAMILY MEDICINE

## 2023-06-23 PROCEDURE — 99233 SBSQ HOSP IP/OBS HIGH 50: CPT | Mod: 57,,, | Performed by: STUDENT IN AN ORGANIZED HEALTH CARE EDUCATION/TRAINING PROGRAM

## 2023-06-23 PROCEDURE — 47562 LAPAROSCOPIC CHOLECYSTECTOMY: CPT | Mod: 22,,, | Performed by: STUDENT IN AN ORGANIZED HEALTH CARE EDUCATION/TRAINING PROGRAM

## 2023-06-23 RX ORDER — SODIUM CHLORIDE, SODIUM LACTATE, POTASSIUM CHLORIDE, CALCIUM CHLORIDE 600; 310; 30; 20 MG/100ML; MG/100ML; MG/100ML; MG/100ML
INJECTION, SOLUTION INTRAVENOUS CONTINUOUS
Status: DISCONTINUED | OUTPATIENT
Start: 2023-06-23 | End: 2023-06-25

## 2023-06-23 RX ORDER — ROCURONIUM BROMIDE 10 MG/ML
INJECTION, SOLUTION INTRAVENOUS
Status: DISCONTINUED | OUTPATIENT
Start: 2023-06-23 | End: 2023-06-23

## 2023-06-23 RX ORDER — SODIUM CHLORIDE 0.9 % (FLUSH) 0.9 %
10 SYRINGE (ML) INJECTION
Status: DISCONTINUED | OUTPATIENT
Start: 2023-06-23 | End: 2023-06-23 | Stop reason: HOSPADM

## 2023-06-23 RX ORDER — HYDROMORPHONE HYDROCHLORIDE 1 MG/ML
INJECTION, SOLUTION INTRAMUSCULAR; INTRAVENOUS; SUBCUTANEOUS
Status: COMPLETED
Start: 2023-06-23 | End: 2023-06-23

## 2023-06-23 RX ORDER — OXYCODONE HYDROCHLORIDE 5 MG/1
5 TABLET ORAL EVERY 4 HOURS PRN
Status: DISCONTINUED | OUTPATIENT
Start: 2023-06-23 | End: 2023-06-25 | Stop reason: HOSPADM

## 2023-06-23 RX ORDER — SODIUM CHLORIDE 9 MG/ML
INJECTION, SOLUTION INTRAVENOUS CONTINUOUS
Status: ACTIVE | OUTPATIENT
Start: 2023-06-23 | End: 2023-06-23

## 2023-06-23 RX ORDER — BUPIVACAINE HYDROCHLORIDE 2.5 MG/ML
INJECTION, SOLUTION EPIDURAL; INFILTRATION; INTRACAUDAL
Status: DISCONTINUED | OUTPATIENT
Start: 2023-06-23 | End: 2023-06-23 | Stop reason: HOSPADM

## 2023-06-23 RX ORDER — HYDROMORPHONE HYDROCHLORIDE 1 MG/ML
0.2 INJECTION, SOLUTION INTRAMUSCULAR; INTRAVENOUS; SUBCUTANEOUS EVERY 5 MIN PRN
Status: DISCONTINUED | OUTPATIENT
Start: 2023-06-23 | End: 2023-06-23 | Stop reason: HOSPADM

## 2023-06-23 RX ORDER — EPHEDRINE SULFATE 50 MG/ML
INJECTION, SOLUTION INTRAVENOUS
Status: DISCONTINUED | OUTPATIENT
Start: 2023-06-23 | End: 2023-06-23

## 2023-06-23 RX ORDER — ACETAMINOPHEN 500 MG
1000 TABLET ORAL
Status: ACTIVE | OUTPATIENT
Start: 2023-06-23 | End: 2023-06-23

## 2023-06-23 RX ORDER — KETAMINE HCL IN 0.9 % NACL 50 MG/5 ML
SYRINGE (ML) INTRAVENOUS
Status: DISCONTINUED | OUTPATIENT
Start: 2023-06-23 | End: 2023-06-23

## 2023-06-23 RX ORDER — SUCCINYLCHOLINE CHLORIDE 20 MG/ML
INJECTION INTRAMUSCULAR; INTRAVENOUS
Status: DISCONTINUED | OUTPATIENT
Start: 2023-06-23 | End: 2023-06-23

## 2023-06-23 RX ORDER — DEXAMETHASONE SODIUM PHOSPHATE 4 MG/ML
INJECTION, SOLUTION INTRA-ARTICULAR; INTRALESIONAL; INTRAMUSCULAR; INTRAVENOUS; SOFT TISSUE
Status: DISCONTINUED | OUTPATIENT
Start: 2023-06-23 | End: 2023-06-23

## 2023-06-23 RX ORDER — PROPOFOL 10 MG/ML
VIAL (ML) INTRAVENOUS
Status: DISCONTINUED | OUTPATIENT
Start: 2023-06-23 | End: 2023-06-23

## 2023-06-23 RX ORDER — INSULIN ASPART 100 [IU]/ML
0-5 INJECTION, SOLUTION INTRAVENOUS; SUBCUTANEOUS
Status: DISCONTINUED | OUTPATIENT
Start: 2023-06-23 | End: 2023-06-25 | Stop reason: HOSPADM

## 2023-06-23 RX ORDER — FENTANYL CITRATE 50 UG/ML
INJECTION, SOLUTION INTRAMUSCULAR; INTRAVENOUS
Status: DISCONTINUED | OUTPATIENT
Start: 2023-06-23 | End: 2023-06-23

## 2023-06-23 RX ORDER — HALOPERIDOL 5 MG/ML
0.5 INJECTION INTRAMUSCULAR EVERY 10 MIN PRN
Status: DISCONTINUED | OUTPATIENT
Start: 2023-06-23 | End: 2023-06-23 | Stop reason: HOSPADM

## 2023-06-23 RX ORDER — CEFAZOLIN SODIUM 1 G/3ML
INJECTION, POWDER, FOR SOLUTION INTRAMUSCULAR; INTRAVENOUS
Status: DISCONTINUED | OUTPATIENT
Start: 2023-06-23 | End: 2023-06-23

## 2023-06-23 RX ORDER — HYDROMORPHONE HYDROCHLORIDE 1 MG/ML
0.5 INJECTION, SOLUTION INTRAMUSCULAR; INTRAVENOUS; SUBCUTANEOUS EVERY 4 HOURS PRN
Status: DISCONTINUED | OUTPATIENT
Start: 2023-06-23 | End: 2023-06-25 | Stop reason: HOSPADM

## 2023-06-23 RX ORDER — INSULIN ASPART 100 [IU]/ML
0-5 INJECTION, SOLUTION INTRAVENOUS; SUBCUTANEOUS EVERY 4 HOURS PRN
Status: DISCONTINUED | OUTPATIENT
Start: 2023-06-23 | End: 2023-06-23

## 2023-06-23 RX ORDER — LIDOCAINE HYDROCHLORIDE 20 MG/ML
INJECTION, SOLUTION EPIDURAL; INFILTRATION; INTRACAUDAL; PERINEURAL
Status: DISCONTINUED | OUTPATIENT
Start: 2023-06-23 | End: 2023-06-23

## 2023-06-23 RX ORDER — PHENYLEPHRINE HCL IN 0.9% NACL 1 MG/10 ML
SYRINGE (ML) INTRAVENOUS
Status: DISCONTINUED | OUTPATIENT
Start: 2023-06-23 | End: 2023-06-23

## 2023-06-23 RX ORDER — DEXMEDETOMIDINE HYDROCHLORIDE 100 UG/ML
INJECTION, SOLUTION INTRAVENOUS
Status: DISCONTINUED | OUTPATIENT
Start: 2023-06-23 | End: 2023-06-23

## 2023-06-23 RX ADMIN — FENTANYL CITRATE 25 MCG: 50 INJECTION INTRAMUSCULAR; INTRAVENOUS at 02:06

## 2023-06-23 RX ADMIN — AMLODIPINE BESYLATE 10 MG: 10 TABLET ORAL at 08:06

## 2023-06-23 RX ADMIN — SODIUM CHLORIDE: 9 INJECTION, SOLUTION INTRAVENOUS at 07:06

## 2023-06-23 RX ADMIN — HYDROMORPHONE HYDROCHLORIDE 0.2 MG: 1 INJECTION, SOLUTION INTRAMUSCULAR; INTRAVENOUS; SUBCUTANEOUS at 03:06

## 2023-06-23 RX ADMIN — EPHEDRINE SULFATE 10 MG: 50 INJECTION INTRAVENOUS at 12:06

## 2023-06-23 RX ADMIN — PROCHLORPERAZINE EDISYLATE 5 MG: 5 INJECTION INTRAMUSCULAR; INTRAVENOUS at 02:06

## 2023-06-23 RX ADMIN — CEFAZOLIN 2 G: 330 INJECTION, POWDER, FOR SOLUTION INTRAMUSCULAR; INTRAVENOUS at 12:06

## 2023-06-23 RX ADMIN — SODIUM CHLORIDE: 0.9 INJECTION, SOLUTION INTRAVENOUS at 11:06

## 2023-06-23 RX ADMIN — ISOSORBIDE DINITRATE 10 MG: 10 TABLET ORAL at 09:06

## 2023-06-23 RX ADMIN — HYDROMORPHONE HYDROCHLORIDE 0.2 MG: 1 INJECTION, SOLUTION INTRAMUSCULAR; INTRAVENOUS; SUBCUTANEOUS at 02:06

## 2023-06-23 RX ADMIN — INSULIN ASPART 6 UNITS: 100 INJECTION, SOLUTION INTRAVENOUS; SUBCUTANEOUS at 06:06

## 2023-06-23 RX ADMIN — OXYCODONE HYDROCHLORIDE 5 MG: 5 TABLET ORAL at 08:06

## 2023-06-23 RX ADMIN — HYDRALAZINE HYDROCHLORIDE 25 MG: 25 TABLET, FILM COATED ORAL at 08:06

## 2023-06-23 RX ADMIN — FAMOTIDINE 20 MG: 20 TABLET, FILM COATED ORAL at 08:06

## 2023-06-23 RX ADMIN — ISOSORBIDE DINITRATE 10 MG: 10 TABLET ORAL at 08:06

## 2023-06-23 RX ADMIN — ONDANSETRON 4 MG: 2 INJECTION INTRAMUSCULAR; INTRAVENOUS at 01:06

## 2023-06-23 RX ADMIN — PROPOFOL 150 MG: 10 INJECTION, EMULSION INTRAVENOUS at 11:06

## 2023-06-23 RX ADMIN — PIPERACILLIN SODIUM AND TAZOBACTAM SODIUM 4.5 G: 4; .5 INJECTION, POWDER, LYOPHILIZED, FOR SOLUTION INTRAVENOUS at 09:06

## 2023-06-23 RX ADMIN — PROPOFOL 30 MG: 10 INJECTION, EMULSION INTRAVENOUS at 01:06

## 2023-06-23 RX ADMIN — PIPERACILLIN SODIUM AND TAZOBACTAM SODIUM 4.5 G: 4; .5 INJECTION, POWDER, LYOPHILIZED, FOR SOLUTION INTRAVENOUS at 05:06

## 2023-06-23 RX ADMIN — OXYCODONE HYDROCHLORIDE 5 MG: 5 TABLET ORAL at 03:06

## 2023-06-23 RX ADMIN — HYDROMORPHONE HYDROCHLORIDE 0.5 MG: 1 INJECTION, SOLUTION INTRAMUSCULAR; INTRAVENOUS; SUBCUTANEOUS at 05:06

## 2023-06-23 RX ADMIN — FENTANYL CITRATE 25 MCG: 50 INJECTION INTRAMUSCULAR; INTRAVENOUS at 01:06

## 2023-06-23 RX ADMIN — Medication 10 MG: at 12:06

## 2023-06-23 RX ADMIN — HYDROMORPHONE HYDROCHLORIDE 0.5 MG: 1 INJECTION, SOLUTION INTRAMUSCULAR; INTRAVENOUS; SUBCUTANEOUS at 09:06

## 2023-06-23 RX ADMIN — SUCCINYLCHOLINE 140 MG: 20 INJECTION, SOLUTION INTRAMUSCULAR; INTRAVENOUS at 11:06

## 2023-06-23 RX ADMIN — DEXMEDETOMIDINE 12 MCG: 100 INJECTION, SOLUTION, CONCENTRATE INTRAVENOUS at 02:06

## 2023-06-23 RX ADMIN — DEXAMETHASONE SODIUM PHOSPHATE 4 MG: 4 INJECTION INTRA-ARTICULAR; INTRALESIONAL; INTRAMUSCULAR; INTRAVENOUS; SOFT TISSUE at 12:06

## 2023-06-23 RX ADMIN — ROCURONIUM BROMIDE 45 MG: 10 INJECTION, SOLUTION INTRAVENOUS at 12:06

## 2023-06-23 RX ADMIN — EPHEDRINE SULFATE 5 MG: 50 INJECTION INTRAVENOUS at 01:06

## 2023-06-23 RX ADMIN — HYDRALAZINE HYDROCHLORIDE 25 MG: 25 TABLET, FILM COATED ORAL at 09:06

## 2023-06-23 RX ADMIN — Medication 100 MCG: at 12:06

## 2023-06-23 RX ADMIN — ONDANSETRON 4 MG: 2 INJECTION INTRAMUSCULAR; INTRAVENOUS at 06:06

## 2023-06-23 RX ADMIN — SODIUM CHLORIDE, POTASSIUM CHLORIDE, SODIUM LACTATE AND CALCIUM CHLORIDE: 600; 310; 30; 20 INJECTION, SOLUTION INTRAVENOUS at 04:06

## 2023-06-23 RX ADMIN — LIDOCAINE HYDROCHLORIDE 60 MG: 20 INJECTION, SOLUTION EPIDURAL; INFILTRATION; INTRACAUDAL; PERINEURAL at 11:06

## 2023-06-23 RX ADMIN — Medication 200 MCG: at 12:06

## 2023-06-23 RX ADMIN — ROCURONIUM BROMIDE 5 MG: 10 INJECTION, SOLUTION INTRAVENOUS at 11:06

## 2023-06-23 RX ADMIN — FENTANYL CITRATE 50 MCG: 50 INJECTION INTRAMUSCULAR; INTRAVENOUS at 11:06

## 2023-06-23 RX ADMIN — SUGAMMADEX 400 MG: 100 INJECTION, SOLUTION INTRAVENOUS at 02:06

## 2023-06-23 RX ADMIN — INSULIN ASPART 3 UNITS: 100 INJECTION, SOLUTION INTRAVENOUS; SUBCUTANEOUS at 09:06

## 2023-06-23 RX ADMIN — Medication 20 MG: at 12:06

## 2023-06-23 RX ADMIN — SODIUM CHLORIDE, SODIUM GLUCONATE, SODIUM ACETATE, POTASSIUM CHLORIDE, MAGNESIUM CHLORIDE, SODIUM PHOSPHATE, DIBASIC, AND POTASSIUM PHOSPHATE: .53; .5; .37; .037; .03; .012; .00082 INJECTION, SOLUTION INTRAVENOUS at 11:06

## 2023-06-23 NOTE — PLAN OF CARE
Problem: Adult Inpatient Plan of Care  Goal: Plan of Care Review  6/23/2023 1826 by Valentine Gonzales RN  Outcome: Ongoing, Progressing  6/23/2023 1711 by Valentine Gonzales RN  Outcome: Ongoing, Progressing  Goal: Patient-Specific Goal (Individualized)  6/23/2023 1826 by Valentine Gonzales RN  Outcome: Ongoing, Progressing  6/23/2023 1711 by Valentine Gonzales RN  Outcome: Ongoing, Progressing  Goal: Absence of Hospital-Acquired Illness or Injury  6/23/2023 1826 by Valentine Gonzales RN  Outcome: Ongoing, Progressing  6/23/2023 1711 by Valentine Gonzales RN  Outcome: Ongoing, Progressing  Goal: Optimal Comfort and Wellbeing  6/23/2023 1826 by Valentine Gonzales RN  Outcome: Ongoing, Progressing  6/23/2023 1711 by Valentine Gonzales RN  Outcome: Ongoing, Progressing  Goal: Readiness for Transition of Care  6/23/2023 1826 by Valentine Gonzales RN  Outcome: Ongoing, Progressing  6/23/2023 1711 by Valentine Gonzales RN  Outcome: Ongoing, Progressing     Problem: Diabetic Ketoacidosis  Goal: Fluid and Electrolyte Balance with Absence of Ketosis  6/23/2023 1826 by Valentine Gonzales RN  Outcome: Ongoing, Progressing  6/23/2023 1711 by Valentine Gonzales RN  Outcome: Ongoing, Progressing     Problem: Diabetes Comorbidity  Goal: Blood Glucose Level Within Targeted Range  6/23/2023 1826 by Valentine Gonzales RN  Outcome: Ongoing, Progressing  6/23/2023 1711 by Valentine Gonzales RN  Outcome: Ongoing, Progressing     Problem: Fluid and Electrolyte Imbalance (Acute Kidney Injury/Impairment)  Goal: Fluid and Electrolyte Balance  6/23/2023 1826 by Valentine Gonzales RN  Outcome: Ongoing, Progressing  6/23/2023 1711 by Valentine Gonzales RN  Outcome: Ongoing, Progressing     Problem: Oral Intake Inadequate (Acute Kidney Injury/Impairment)  Goal: Optimal Nutrition Intake  6/23/2023 1826 by Valentine Gonzales RN  Outcome: Ongoing, Progressing  6/23/2023 1711 by Janaytt Christian, RN  Outcome: Ongoing, Progressing     Problem: Renal Function Impairment (Acute Kidney  Injury/Impairment)  Goal: Effective Renal Function  6/23/2023 1826 by Valentine Gonzales RN  Outcome: Ongoing, Progressing  6/23/2023 1711 by Valentine Gonzales RN  Outcome: Ongoing, Progressing     Problem: Adjustment to Illness (Sepsis/Septic Shock)  Goal: Optimal Coping  6/23/2023 1826 by Valentine Gonzales RN  Outcome: Ongoing, Progressing  6/23/2023 1711 by Valentine Gonzales RN  Outcome: Ongoing, Progressing     Problem: Bleeding (Sepsis/Septic Shock)  Goal: Absence of Bleeding  6/23/2023 1826 by Valentine Gonzales RN  Outcome: Ongoing, Progressing  6/23/2023 1711 by Valentine Gonzales RN  Outcome: Ongoing, Progressing     Problem: Glycemic Control Impaired (Sepsis/Septic Shock)  Goal: Blood Glucose Level Within Desired Range  6/23/2023 1826 by Valentine Gonzales RN  Outcome: Ongoing, Progressing  6/23/2023 1711 by Valentine Gonzales RN  Outcome: Ongoing, Progressing     Problem: Infection Progression (Sepsis/Septic Shock)  Goal: Absence of Infection Signs and Symptoms  6/23/2023 1826 by Valentine Gonzales RN  Outcome: Ongoing, Progressing  6/23/2023 1711 by Valentine Gonzales RN  Outcome: Ongoing, Progressing     Problem: Nutrition Impaired (Sepsis/Septic Shock)  Goal: Optimal Nutrition Intake  6/23/2023 1826 by Valentine Gonzales RN  Outcome: Ongoing, Progressing  6/23/2023 1711 by Valentine Gonzales RN  Outcome: Ongoing, Progressing     Problem: Skin Injury Risk Increased  Goal: Skin Health and Integrity  6/23/2023 1826 by Valentine Gonzales RN  Outcome: Ongoing, Progressing  6/23/2023 1711 by Valentine Gonzales RN  Outcome: Ongoing, Progressing     Problem: Fall Injury Risk  Goal: Absence of Fall and Fall-Related Injury  6/23/2023 1826 by Valentine Gonzales RN  Outcome: Ongoing, Progressing  6/23/2023 1711 by Valentine Gonzales RN  Outcome: Ongoing, Progressing     Problem: Impaired Wound Healing  Goal: Optimal Wound Healing  6/23/2023 1826 by Valentine Gonzales RN  Outcome: Ongoing, Progressing  6/23/2023 1711 by Valentine Gonzales, RN  Outcome: Ongoing,  Progressing  Pt. Laying in bed resting with eyes open after gallbladder surgery pt is aaox4  resp even and unlabored no distress noted at this time c/o given prn dilaudid no further pain voiced geovani drain on right side draining red fluid safety precautions maintained.

## 2023-06-23 NOTE — TRANSFER OF CARE
"Anesthesia Transfer of Care Note    Patient: Mariann Huff    Procedure(s) Performed: Procedure(s) (LRB):  CHOLECYSTECTOMY, LAPAROSCOPIC (N/A)    Patient location: PACU    Anesthesia Type: general    Transport from OR: Transported from OR on 6-10 L/min O2 by face mask with adequate spontaneous ventilation    Post pain: adequate analgesia    Post assessment: no apparent anesthetic complications and tolerated procedure well    Post vital signs: stable    Level of consciousness: responds to stimulation    Nausea/Vomiting: no nausea/vomiting    Complications: none    Transfer of care protocol was followed      Last vitals:   Visit Vitals  /65   Pulse 77   Temp 36.3 °C (97.3 °F) (Temporal)   Resp 20   Ht 5' 4" (1.626 m)   Wt 62.8 kg (138 lb 7.2 oz)   LMP  (LMP Unknown)   SpO2 100%   Breastfeeding No   BMI 23.76 kg/m²     "

## 2023-06-23 NOTE — ANESTHESIA POSTPROCEDURE EVALUATION
Anesthesia Post Evaluation    Patient: Mariann Huff    Procedure(s) Performed: Procedure(s) (LRB):  CHOLECYSTECTOMY, LAPAROSCOPIC (N/A)    Final Anesthesia Type: general      Patient location during evaluation: PACU  Patient participation: Yes- Able to Participate  Level of consciousness: awake and alert  Post-procedure vital signs: reviewed and stable  Pain management: adequate  Airway patency: patent    PONV status at discharge: No PONV  Anesthetic complications: no      Cardiovascular status: blood pressure returned to baseline  Respiratory status: unassisted  Hydration status: euvolemic  Follow-up not needed.          Vitals Value Taken Time   /66 06/23/23 1516   Temp 36.3 °C (97.3 °F) 06/23/23 1416   Pulse 77 06/23/23 1526   Resp 0 06/23/23 1526   SpO2 100 % 06/23/23 1526   Vitals shown include unvalidated device data.      Event Time   Out of Recovery 06/23/2023 14:45:00         Pain/Derick Score: Pain Rating Prior to Med Admin: 7 (6/23/2023  2:52 PM)  Derick Score: 9 (6/23/2023  2:45 PM)

## 2023-06-23 NOTE — ASSESSMENT & PLAN NOTE
- hold Pletal, ASA, and statin  - started on apixaban for PAD outpatient as well as A-fib  - held apixaban for ERCP and again for lap adenike

## 2023-06-23 NOTE — PLAN OF CARE
Chart reviewed. Preop nursing care completed per orders. Safe surgery checklist complete. Family at bedside. Waiting for surgical consent prior to surgery. Call bell within reach. Instructed pt to call for assistance.

## 2023-06-23 NOTE — PROGRESS NOTES
[facility] - Telemetry Stepdown  Endocrinology  Progress Note    Admit Date: 2023     63 year old  female with type 2 diabetes mellitus, CKDIV, CAD, hypertension, hyperlipidemia, MURTAZA (not on CPAP), PAD, Afib and hx of DVT on Eliquis and recent admission for pancreatitis who presents with HHS and developing possible biliary obstruction.     - Patient recently admitted  to  for acute pancreatitis attributed to Trulicity, which was discontinued upon d/c. Per pt's PCP this was 2nd episode of pancreatitis while on trulicity -- prior episode when increasing dose of Trulicity.   Pt states upon d/c  she was feeling slight improvement but still having intermittent n/v and not tolerating normal diet. N/V began to get worse in last few days which prompted admission. No abdominal pain on presentation for current admission, unlike last admit when she had severe abd pain for pancreatitis.    In the ED patient afebrile and hemodynamically stable saturating well on room air. Lipase >2000. BG >700 and serum osmo 326 after 1L IVFs. B-hydroxybutyrate 1.3. pH 7.4 and bicarb 34. AG 12. Patient started on aggressive IVFs and insulin gtt for HHS.     - Endocrinology consulted for initial HHS and glucose management      Regarding Type 2 Diabetes Mellitus:    - Initially diagnosed with Type 2 diabetes mellitus: Over 20 years prior  - Home diabetic medications include: Farxiga 10mg qd, Glipizide ER 10mg daily, Levemir 20u qhs (but never started). She has been on MDI previously, most recently she was on Toujeo and Humalog discontinued 2021 but she states she was on insulin up until about 10mo ago.  - Family History: Not asked  - Weight based dosin kg x 0.4 (CKD) = 25 TDD x 0.5 = 12 basal / 12 prandial  - 1700/TDD = 68 (estimated insulin sensitivity factor)  - 450/TDD = 18 (estimated starting carb ratio for prandial dosing)    Lab Results   Component Value Date    HGBA1C 8.1 (H) 2023    HGBA1C 8.2  "(H) 04/05/2023    HGBA1C 8.8 (H) 03/29/2023    CPEPTIDE 1.36 06/12/2017     Lab Results   Component Value Date    EGFRNORACEVR 12.8 (A) 06/18/2023    EGFRNORACEVR 16.4 (A) 06/17/2023    EGFRNORACEVR 19.3 (A) 06/16/2023       Interval HPI:   Overnight events: Lap Aimee today, NPO for this.  No new overnight concerns.  AM glucose in 180s.      Eating:   NPO  Nausea: No  Hypoglycemia and intervention: No  Fever: No  TPN and/or TF: No  If yes, type of TF/TPN and rate: NA    BP (!) 157/76 (BP Location: Right arm)   Pulse 79   Temp 97.9 °F (36.6 °C) (Oral)   Resp 20   Ht 5' 4" (1.626 m)   Wt 62.8 kg (138 lb 7.2 oz)   LMP  (LMP Unknown)   SpO2 100%   Breastfeeding No   BMI 23.76 kg/m²     Labs Reviewed and Include    Recent Labs   Lab 06/23/23  0330   *   CALCIUM 9.0   ALBUMIN 1.8*   PROT 6.7      K 4.2   CO2 26      BUN 18   CREATININE 1.7*   ALKPHOS 810*   ALT 50*   AST 75*   BILITOT 1.4*     Lab Results   Component Value Date    WBC 16.01 (H) 06/23/2023    HGB 8.6 (L) 06/23/2023    HCT 27.6 (L) 06/23/2023    MCV 84 06/23/2023     06/23/2023     No results for input(s): TSH, FREET4 in the last 168 hours.  Lab Results   Component Value Date    HGBA1C 8.1 (H) 05/25/2023       Nutritional status:   Body mass index is 23.76 kg/m².  Lab Results   Component Value Date    ALBUMIN 1.8 (L) 06/23/2023    ALBUMIN 1.9 (L) 06/22/2023    ALBUMIN 1.9 (L) 06/21/2023     No results found for: PREALBUMIN    Estimated Creatinine Clearance: 29.6 mL/min (A) (based on SCr of 1.7 mg/dL (H)).    Accu-Checks  Recent Labs     06/21/23  0743 06/21/23  1133 06/21/23  1613 06/21/23 2052 06/22/23  0848 06/22/23  1134 06/22/23  1601 06/22/23 2025 06/23/23  0751 06/23/23  1036   POCTGLUCOSE 168* 225* 155* 100 88 140* 120* 238* 182* 165*       Current Medications and/or Treatments Impacting Glycemic Control  Immunotherapy:    Immunosuppressants       None          Steroids:   Hormones (From admission, onward)      " Start     Stop Route Frequency Ordered    06/09/23 2028  melatonin tablet 6 mg         -- Oral Nightly PRN 06/09/23 1931          Pressors:    Autonomic Drugs (From admission, onward)      None          Hyperglycemia/Diabetes Medications:   Antihyperglycemics (From admission, onward)      Start     Stop Route Frequency Ordered    06/23/23 0830  insulin aspart U-100 pen 0-5 Units         -- SubQ Every 4 hours PRN 06/23/23 0715    06/22/23 2100  insulin detemir U-100 (Levemir) pen 14 Units         -- SubQ Nightly 06/22/23 0946    06/22/23 1130  insulin aspart U-100 pen 6 Units         -- SubQ 3 times daily with meals 06/22/23 1010    06/10/23 1315  insulin regular in 0.9 % NaCl 100 unit/100 mL (1 unit/mL) infusion        Question:  Enter initial dose (Units/hr):  Answer:  0.6    06/10 2200 IV Continuous 06/10/23 1211            ASSESSMENT and PLAN    Cardiac/Vascular  Hyperlipidemia associated with type 2 diabetes mellitus  - On Lipitor 40mg and Repatha outpatient  - Management per primary    Hypertension associated with diabetes  On multiple anti-HTNs outpatient  Management per primary      Renal/  ATN (acute tubular necrosis)  PAPA 2/2 ATN  Improving -- Cr now 1.7 improving -- this may account for increasing insulin requirements   Keep renal function in mind when adjusting insulin doses as renal clearance impacts insulin requirement       Endocrine  Type 2 diabetes mellitus with chronic kidney disease  - HHS on admission. Recent pancreatitis admission, not yet back to normal po intake with worsening hepatic function. Taking Glipizide and Farxiga at home. Never picked up Levemir Rx recently prescribed by her PCP    - 24 hour glucose trend: 120-230s    -  Continue Levemir 14 units nightly   -  Continue Novolog  6 units before meals + continue low dose correction Novolog prn     - When NPO she should not receive scheduled prandial insulin but she should still be given sliding scale insulin if indicated based on BG  -  Accuchecks ac/hs while diet ordered; otherwise q4 hr while NPO    - Hypoglycemia protocol   - If blood glucose greater than 300, please ask patient not to eat food or drink anything other than water until correctional insulin has brought it back below 250    **Please notify endocrine of change in diet or plans for NPO for surgery (or any other reason) as this may change her insulin requirement**    GI  History of pancreatitis  - She has now had two episodes of pancreatitis on Trulicity, however dx of biliary pancreatitis per primary and GI based on eval this admission   - Episodes of pancreatitis likely multifactorial with GLP1 as contributing factor  - Laparoscopic cholecystectomy scheduled Friday 6/23   - Hold Trulicity at discharge        Tristan Norris, DO  Endocrinology

## 2023-06-23 NOTE — CARE UPDATE
General surgery care update:     -Laparoscopic subtotal cholecystectomy with drain placement preformed given necrotic inflamed gallbladder.   -Clear liquid diet for now   -Record drain output   -Please call with questions     Lasha Wahl MD  Surgery, PGY-2

## 2023-06-23 NOTE — ANESTHESIA PROCEDURE NOTES
Intubation    Date/Time: 6/23/2023 11:51 AM  Performed by: Vilma Campos MD  Authorized by: Dorota Castaneda MD     Intubation:     Induction:  Rapid sequence induction    Intubated:  Postinduction    Mask Ventilation:  Not attempted    Attempts:  1    Attempted By:  Resident anesthesiologist    Method of Intubation:  Video laryngoscopy    Blade:  Schmidt 3    Laryngeal View Grade: Grade I - full view of cords      Difficult Airway Encountered?: No      Complications:  None    Airway Device:  Oral endotracheal tube    Airway Device Size:  7.0    Style/Cuff Inflation:  Cuffed    Inflation Amount (mL):  5    Tube secured:  21    Secured at:  The lips    Placement Verified By:  Capnometry    Complicating Factors:  None    Findings Post-Intubation:  BS equal bilateral and atraumatic/condition of teeth unchanged

## 2023-06-23 NOTE — ASSESSMENT & PLAN NOTE
"Lipase >2000 on presentation, downtrending without any identifiable cause for elevation. Patient without any abdominal pain. Discontinued HCTZ as it could potentially be cause of pancreatitis as well  - US abdomen with gallbladder sludge and "prominence" of the pancreatic duct   - liver enzymes, alk-phos, bilirubin elevated on 06/11, suggestive of possible choledocholithiasis as cause of pancreatitis.  MRCP ordered  - MRCP on my personal interpretation without any focal obstruction or retained stones, however liver enzymes continue to elevate  - AES was consulted for findings and after discussion, recommended ERCP/EUS once off apixaban and Pletal which was completed on 6/15 - biliary sludging found, CBD dilated.   - General surgery assessed for cholecystectomy - planned for 6/23/2023 with apixaban held  - resume Zosyn due to elevated WBC and alk phos; bland diet  "

## 2023-06-23 NOTE — NURSING
Pt. Was transported to 2nd floor for gallbladder removal pt was not on floor to get her noon bloodsugar check or her insulin will check pt sugar when she return

## 2023-06-23 NOTE — NURSING TRANSFER
Nursing Transfer Note      6/23/2023     Reason patient is being transferred: post procedure     Transfer To: [id number]    Transfer via stretcher    Transfer with n/a     Transported by transport     Medicines sent: none    Chart send with patient: Yes    Notified: spouse via telephone     Patient reassessed at: 6/23/2023 8308

## 2023-06-23 NOTE — PROGRESS NOTES
[facility] - Telemetry Stepdown  General Surgery  Progress Note    Subjective:     History of Present Illness:  No notes on file    Post-Op Info:  Procedure(s) (LRB):  ULTRASOUND, UPPER GI TRACT, ENDOSCOPIC (N/A)  ERCP (ENDOSCOPIC RETROGRADE CHOLANGIOPANCREATOGRAPHY) (N/A)   8 Days Post-Op     Interval History: NAEO. Vomiting this am. Will go for lap adenike today. Consent has been obtained.     Medications:  Continuous Infusions:   sodium chloride 0.9% 100 mL/hr at 06/23/23 0753     Scheduled Meds:   acetaminophen  1,000 mg Oral Once Pre-Op    amLODIPine  10 mg Oral Daily    aspirin  81 mg Oral Daily    famotidine  20 mg Oral Daily    hydrALAZINE  25 mg Oral Q12H    insulin aspart U-100  6 Units Subcutaneous TIDWM    insulin detemir U-100  14 Units Subcutaneous QHS    isosorbide dinitrate  10 mg Oral BID    piperacillin-tazobactam (Zosyn) IV (PEDS and ADULTS) (extended infusion is not appropriate)  4.5 g Intravenous Q8H     PRN Meds:acetaminophen, albuterol, dextrose 10%, dextrose 10%, dextrose, dextrose, glucagon (human recombinant), hydrALAZINE, insulin aspart U-100, melatonin, ondansetron, prochlorperazine, sodium chloride 0.9%     Review of patient's allergies indicates:   Allergen Reactions    Milk containing products (dairy)      Causes GI distress     Objective:     Vital Signs (Most Recent):  Temp: 98 °F (36.7 °C) (06/23/23 0736)  Pulse: 75 (06/23/23 0736)  Resp: 18 (06/23/23 0736)  BP: (!) 166/72 (06/23/23 0736)  SpO2: 95 % (06/23/23 0432) Vital Signs (24h Range):  Temp:  [97.6 °F (36.4 °C)-99.1 °F (37.3 °C)] 98 °F (36.7 °C)  Pulse:  [75-84] 75  Resp:  [15-18] 18  SpO2:  [95 %-99 %] 95 %  BP: (102-166)/(55-74) 166/72     Weight: 62.8 kg (138 lb 7.2 oz)  Body mass index is 23.76 kg/m².    Intake/Output - Last 3 Shifts         06/21 0700 06/22 0659 06/22 0700 06/23 0659 06/23 0700 06/24 0659    P.O.  120     Total Intake(mL/kg)  120 (1.9)     Urine (mL/kg/hr) 700 (0.5) 150 (0.1)     Stool 0       Total Output 700 150     Net -700 -30            Urine Occurrence  1 x     Stool Occurrence 0 x               Physical Exam  Vitals and nursing note reviewed.   Constitutional:       General: She is not in acute distress.     Appearance: Normal appearance.   HENT:      Head: Normocephalic and atraumatic.   Eyes:      Extraocular Movements: Extraocular movements intact.   Cardiovascular:      Rate and Rhythm: Normal rate.   Pulmonary:      Effort: Pulmonary effort is normal. No tachypnea or respiratory distress.   Abdominal:      General: There is no distension.      Palpations: Abdomen is soft.      Tenderness: There is no abdominal tenderness.   Neurological:      General: No focal deficit present.      Mental Status: She is alert and oriented to person, place, and time.      Cranial Nerves: No cranial nerve deficit.      Motor: No weakness.   Psychiatric:         Attention and Perception: Attention normal.         Mood and Affect: Mood and affect normal.         Speech: Speech normal.         Behavior: Behavior is cooperative.        Significant Labs:  I have reviewed all pertinent lab results within the past 24 hours.    Significant Diagnostics:  I have reviewed all pertinent imaging results/findings within the past 24 hours.    Assessment/Plan:     Acute pancreatitis  Mariann Huff is a 63 y.o. female PMH CAD with GINGER , HTN, HLD, DM2, MURTAZA (not on CPAP), PAD, Afib and hx of DVT on Eliquis, biliary pancreatitis admitted to [facility] on 6/9/2023 for biliary pancreatitis. Now s/p ERCP on 6/15/23 with sphincterotomy/biliary tree swept and stent placed in CBD. General Surgery consulted for evaluation for cholecystectomy.     - Plan for OR today for lap adenike   - Trend labs  - Okay for DVT ppx, please continue to  hold anticoagulation  - Please call with questions        Lasha Wahl MD  General Surgery  [facility] - Telemetry Stepdown

## 2023-06-23 NOTE — SUBJECTIVE & OBJECTIVE
"Interval HPI:   Overnight events: Lap Aimee today, NPO for this.  No new overnight concerns.  AM glucose in 180s.      Eating:   NPO  Nausea: No  Hypoglycemia and intervention: No  Fever: No  TPN and/or TF: No  If yes, type of TF/TPN and rate: NA    BP (!) 157/76 (BP Location: Right arm)   Pulse 79   Temp 97.9 °F (36.6 °C) (Oral)   Resp 20   Ht 5' 4" (1.626 m)   Wt 62.8 kg (138 lb 7.2 oz)   LMP  (LMP Unknown)   SpO2 100%   Breastfeeding No   BMI 23.76 kg/m²     Labs Reviewed and Include    Recent Labs   Lab 06/23/23  0330   *   CALCIUM 9.0   ALBUMIN 1.8*   PROT 6.7      K 4.2   CO2 26      BUN 18   CREATININE 1.7*   ALKPHOS 810*   ALT 50*   AST 75*   BILITOT 1.4*     Lab Results   Component Value Date    WBC 16.01 (H) 06/23/2023    HGB 8.6 (L) 06/23/2023    HCT 27.6 (L) 06/23/2023    MCV 84 06/23/2023     06/23/2023     No results for input(s): TSH, FREET4 in the last 168 hours.  Lab Results   Component Value Date    HGBA1C 8.1 (H) 05/25/2023       Nutritional status:   Body mass index is 23.76 kg/m².  Lab Results   Component Value Date    ALBUMIN 1.8 (L) 06/23/2023    ALBUMIN 1.9 (L) 06/22/2023    ALBUMIN 1.9 (L) 06/21/2023     No results found for: PREALBUMIN    Estimated Creatinine Clearance: 29.6 mL/min (A) (based on SCr of 1.7 mg/dL (H)).    Accu-Checks  Recent Labs     06/21/23  0743 06/21/23  1133 06/21/23  1613 06/21/23  2052 06/22/23  0848 06/22/23  1134 06/22/23  1601 06/22/23  2025 06/23/23  0751 06/23/23  1036   POCTGLUCOSE 168* 225* 155* 100 88 140* 120* 238* 182* 165*       Current Medications and/or Treatments Impacting Glycemic Control  Immunotherapy:    Immunosuppressants       None          Steroids:   Hormones (From admission, onward)      Start     Stop Route Frequency Ordered    06/09/23 2028  melatonin tablet 6 mg         -- Oral Nightly PRN 06/09/23 1931          Pressors:    Autonomic Drugs (From admission, onward)      None          Hyperglycemia/Diabetes " Medications:   Antihyperglycemics (From admission, onward)      Start     Stop Route Frequency Ordered    06/23/23 0830  insulin aspart U-100 pen 0-5 Units         -- SubQ Every 4 hours PRN 06/23/23 0715    06/22/23 2100  insulin detemir U-100 (Levemir) pen 14 Units         -- SubQ Nightly 06/22/23 0946    06/22/23 1130  insulin aspart U-100 pen 6 Units         -- SubQ 3 times daily with meals 06/22/23 1010    06/10/23 1315  insulin regular in 0.9 % NaCl 100 unit/100 mL (1 unit/mL) infusion        Question:  Enter initial dose (Units/hr):  Answer:  0.6    06/10 2200 IV Continuous 06/10/23 1211

## 2023-06-23 NOTE — SUBJECTIVE & OBJECTIVE
Interval History: Virtual follow up visit for suspected Hyperglycemia [R73.9]  Chest pain [R07.9]  Non compliance w medication regimen [Z91.148]  Acute pancreatitis, unspecified complication status, unspecified pancreatitis type [K85.90]  Hyperosmolar hyperglycemic state (HHS) [E11.00] present on admission.   This service was provided by telemedicine.    Patient was transferred to [facility] on: 06/20/2023   The [facility] location is: [facility]   Admitted 6/9/2023  2:20 PM    The patient is able to provide adequate history. Additional history was obtained from: chart review.  No significant events overnight reported.  Patient reports complaints of nothing new - waiting for surgery.  Symptoms are stable since yesterday.     I have reviewed the following on 6/23/2023:     Details     [x]   Lab results  sCr stable. Liver enzymes stable. BGs controlled. H&H stable    []   Micro reports     []   Pathology reports     []   Imaging reports     []   Cardiology Procedure reports     []   Outside records/CareEverywhere     []  Independently viewed:       Review of Systems   Constitutional:  Negative for fever.   Respiratory:  Negative for shortness of breath.      Objective:     Vital Signs (Most Recent):  Temp: 98 °F (36.7 °C) (06/23/23 0736)  Pulse: 75 (06/23/23 0736)  Resp: 18 (06/23/23 0736)  BP: (!) 166/72 (06/23/23 0736)  SpO2: 95 % (06/23/23 0845) Vital Signs (24h Range):  Temp:  [97.6 °F (36.4 °C)-99.1 °F (37.3 °C)] 98 °F (36.7 °C)  Pulse:  [75-84] 75  Resp:  [15-18] 18  SpO2:  [95 %-99 %] 95 %  BP: (102-166)/(55-74) 166/72     Weight: 62.8 kg (138 lb 7.2 oz)  Body mass index is 23.76 kg/m².    Intake/Output Summary (Last 24 hours) at 6/23/2023 1003  Last data filed at 6/23/2023 0607  Gross per 24 hour   Intake 120 ml   Output 150 ml   Net -30 ml           Physical Exam  Cardiovascular:      Comments: Monitor and/or Vital signs reviewed at time of visit  Pulmonary:      Effort: Pulmonary effort is  normal. No accessory muscle usage or respiratory distress.   Neurological:      Mental Status: She is alert. She is not disoriented.   Psychiatric:         Attention and Perception: Attention normal.         Behavior: Behavior is cooperative.           Significant Labs:   Recent Labs   Lab 03/29/23  0807 04/05/23  0928 05/25/23  1134   HGBA1C 8.8* 8.2* 8.1*       Recent Labs   Lab 06/22/23  1601 06/22/23  2025 06/23/23  0751   POCTGLUCOSE 120* 238* 182*       Recent Labs   Lab 06/21/23  0355 06/22/23  0505 06/23/23  0330   WBC 12.37 16.42* 16.01*   HGB 9.6* 9.1* 8.6*   HCT 29.7* 29.2* 27.6*    301 298       Recent Labs   Lab 06/21/23  0355 06/22/23  0505 06/23/23  0330   GRAN 75.9*  9.4* 79.8*  13.1* 75.8*  12.1*   LYMPH 8.2*  1.0 8.5*  1.4 12.2*  2.0   MONO 8.1  1.0 6.3  1.0 8.0  1.3*   EOS 0.5 0.4 0.3       Recent Labs   Lab 06/21/23  0355 06/22/23  0505 06/23/23  0330    145 142   K 3.0* 3.3* 4.2    108 105   CO2 23 25 26   BUN 29* 20 18   CREATININE 1.8* 1.5* 1.7*   * 83 172*   CALCIUM 9.3 8.9 9.0   ALBUMIN 1.9* 1.9* 1.8*   MG 1.7 1.5* 1.6   PHOS 1.9* 2.4* 2.2*       Recent Labs   Lab 06/21/23  0355 06/22/23  0505 06/23/23  0330   ALKPHOS 853* 887* 810*   ALT 65* 57* 50*   AST 83* 91* 75*   PROT 6.9 6.8 6.7   BILITOT 1.6* 1.6* 1.4*       Recent Labs   Lab 06/09/23 2004 06/13/23 0813 06/14/23 2238   LACTATE 1.6 1.3 1.6       SARS-CoV2 (COVID-19) Qualitative PCR (no units)   Date Value   09/25/2021 Not Detected   12/07/2020 Not Detected     Results for orders placed during the hospital encounter of 05/25/23    Echo    Interpretation Summary  · The estimated ejection fraction is 55%.  · The quantitatively derived ejection fraction is 52%.  · Normal right ventricular size with normal right ventricular systolic function.  · Normal left ventricular diastolic function.  · The left ventricle is normal in size with normal systolic function.  · Normal central venous pressure (3  mmHg).      US Kidney  Narrative: EXAMINATION:  US KIDNEY    CLINICAL HISTORY:  Silvino, hydronephrosis;    TECHNIQUE:  Ultrasound of the kidneys was performed including color flow and Doppler evaluation of the kidneys.    COMPARISON:  Ultrasound retroperitoneal 06/16/2023    FINDINGS:  Right kidney: The right kidney measures 12.2 cm. No cortical thinning. No loss of corticomedullary distinction. Resistive index measures 0.86.  Upper pole 0.8 x 11.0 x 0.7 cm simple cyst, previously 0.8 x 0.8 x 1.0 cm.  No mass. No renal stone. No hydronephrosis.    Left kidney: The left kidney measures 12.1 cm. No cortical thinning. No loss of corticomedullary distinction. Resistive index measures 0.86.  Upper pole 1.9 x 2.3 x 2.0 cm simple cyst, previously 2.2 x 1.9 x 12.4 cm.  Lower pole 2.0 x 2.2 x 3.2 cm minimally complex cyst with 2 mm septation, previously 2.4 x 3.1 x 2.8 cm.  No mass. No renal stone. Mild hydronephrosis, improved from prior.    Splenic resistive index is 0.83.  Impression: Mild left hydronephrosis, improved compared to prior exam.    Elevated arterial resistive indices which may represent medical renal disease.    Bilateral simple and left minimally complex renal cysts.    Electronically signed by resident: Christian Peres  Date:    06/19/2023  Time:    21:36    Electronically signed by: Tristan Santiago  Date:    06/20/2023  Time:    08:51      Labs and Imaging listed above were reviewed.

## 2023-06-23 NOTE — ASSESSMENT & PLAN NOTE
Diet: Diet [facility]; Lactose Restricted, Consistent Carbohydrate  Diet NPO Except for: Medication  Significant LDAs:   IV Access Type: Peripheral  Urinary Catheter Indication if present: Patient Does Not Have Urinary Catheter  Other Lines/Tubes/Drains:     Goals of Care:    Previous admission:  5/25/23  Code Status: Full Code  Likely prognosis:  Good  Advance Care Planning   Date: 06/21/2023  Patient wishes to continue curative therapies       REBECCA: 6/23/2023     Code Status: Full Code   Is the patient medically ready for discharge?: No    Reason for patient still in hospital (select all that apply): Patient trending condition, Laboratory test, Treatment, Consult recommendations and Other (specify) Ramy Yu on 6/23/23  Discharge Plan A: Home Health   Discharge Delays: None known at this time    The amount of time spent during, and associated with, this encounter was 40 minutes; the nature of the activities performed were:  Preparing to see the patient, chart review  Obtaining and/or receiving separately obtained history   Performing medically appropriate examination and evaluation  Counseling and educating the patient/family/caregiver  Ordering medications, tests, or procedures  Referring to and communicating with other health care professionals  Documenting clinical information in the EHR  Independently interpreting results  Care coordination

## 2023-06-23 NOTE — NURSING
Bedside handoff report completed.Pt. Laying in bed resting quietly resp even and unlabored no distress noted at this time safety precautions maintained.

## 2023-06-23 NOTE — SUBJECTIVE & OBJECTIVE
Interval History: NAEO. Vomiting this am. Will go for lap adenike today. Consent has been obtained.     Medications:  Continuous Infusions:   sodium chloride 0.9% 100 mL/hr at 06/23/23 0753     Scheduled Meds:   acetaminophen  1,000 mg Oral Once Pre-Op    amLODIPine  10 mg Oral Daily    aspirin  81 mg Oral Daily    famotidine  20 mg Oral Daily    hydrALAZINE  25 mg Oral Q12H    insulin aspart U-100  6 Units Subcutaneous TIDWM    insulin detemir U-100  14 Units Subcutaneous QHS    isosorbide dinitrate  10 mg Oral BID    piperacillin-tazobactam (Zosyn) IV (PEDS and ADULTS) (extended infusion is not appropriate)  4.5 g Intravenous Q8H     PRN Meds:acetaminophen, albuterol, dextrose 10%, dextrose 10%, dextrose, dextrose, glucagon (human recombinant), hydrALAZINE, insulin aspart U-100, melatonin, ondansetron, prochlorperazine, sodium chloride 0.9%     Review of patient's allergies indicates:   Allergen Reactions    Milk containing products (dairy)      Causes GI distress     Objective:     Vital Signs (Most Recent):  Temp: 98 °F (36.7 °C) (06/23/23 0736)  Pulse: 75 (06/23/23 0736)  Resp: 18 (06/23/23 0736)  BP: (!) 166/72 (06/23/23 0736)  SpO2: 95 % (06/23/23 0432) Vital Signs (24h Range):  Temp:  [97.6 °F (36.4 °C)-99.1 °F (37.3 °C)] 98 °F (36.7 °C)  Pulse:  [75-84] 75  Resp:  [15-18] 18  SpO2:  [95 %-99 %] 95 %  BP: (102-166)/(55-74) 166/72     Weight: 62.8 kg (138 lb 7.2 oz)  Body mass index is 23.76 kg/m².    Intake/Output - Last 3 Shifts         06/21 0700 06/22 0659 06/22 0700 06/23 0659 06/23 0700  06/24 0659    P.O.  120     Total Intake(mL/kg)  120 (1.9)     Urine (mL/kg/hr) 700 (0.5) 150 (0.1)     Stool 0      Total Output 700 150     Net -700 -30            Urine Occurrence  1 x     Stool Occurrence 0 x               Physical Exam  Vitals and nursing note reviewed.   Constitutional:       General: She is not in acute distress.     Appearance: Normal appearance.   HENT:      Head: Normocephalic and atraumatic.    Eyes:      Extraocular Movements: Extraocular movements intact.   Cardiovascular:      Rate and Rhythm: Normal rate.   Pulmonary:      Effort: Pulmonary effort is normal. No tachypnea or respiratory distress.   Abdominal:      General: There is no distension.      Palpations: Abdomen is soft.      Tenderness: There is no abdominal tenderness.   Neurological:      General: No focal deficit present.      Mental Status: She is alert and oriented to person, place, and time.      Cranial Nerves: No cranial nerve deficit.      Motor: No weakness.   Psychiatric:         Attention and Perception: Attention normal.         Mood and Affect: Mood and affect normal.         Speech: Speech normal.         Behavior: Behavior is cooperative.        Significant Labs:  I have reviewed all pertinent lab results within the past 24 hours.    Significant Diagnostics:  I have reviewed all pertinent imaging results/findings within the past 24 hours.

## 2023-06-23 NOTE — ASSESSMENT & PLAN NOTE
Nutrition Problem:  Moderate Protein-Calorie Malnutrition  Malnutrition in the context of Acute Illness/Injury    Related to (etiology):  Inability to consume sufficient energy     Signs and Symptoms (as evidenced by):  Energy Intake: <75% of estimated energy requirement for > 1 week   Weight Loss: 10% x 6 months    Interventions(treatment strategy):  Collaboration of nutrition care w/ other providers  [facility]    Nutrition Diagnosis Status:  New

## 2023-06-23 NOTE — NURSING
Pt. Returned for pacu to this unit on a stretcher pt had gallbladder surgery nurse was called to room by transport when attempting to transport pt noticed a puddle of red blood noticed on right side pt also had a geovani drain in place with small amount of blood noted in drain nurse observe resident site and notified md of the puddle of blood and the site still bleeding as well as family concerned and requesting MD. MD came to unit and assessed pt and stitch the site and held pressure to stop the bleeding  nurse applied drain gauze around the site and secured with medipore tape pt was cleaned and dry by staff no further bleeding notice at this time dressing clean dry intact with small amount of blood noticed. Safety precautions maintained.

## 2023-06-23 NOTE — ASSESSMENT & PLAN NOTE
IP HHS/DKA Pathway initiated. On presentation BG >700 and serum osmo 326 after 1L IVFs. B-hydroxybutyrate 1.3. pH 7.4 and bicarb 34. AG 12. Patient was started on insulin drip on admission but later in the day, patient transitioned to Levemir 14 units at night and aspart 3 units with meals.  Started on diabetic diet  - patient currently on   Antihyperglycemics (From admission, onward)    Start     Stop Route Frequency Ordered    06/22/23 2100  insulin detemir U-100 (Levemir) pen 14 Units         -- SubQ Nightly 06/22/23 0946    06/22/23 1130  insulin aspart U-100 pen 6 Units         -- SubQ 3 times daily with meals 06/22/23 1010    06/10/23 1315  insulin regular in 0.9 % NaCl 100 unit/100 mL (1 unit/mL) infusion        Question:  Enter initial dose (Units/hr):  Answer:  0.6    06/10 2200 IV Continuous 06/10/23 1211    06/10/23 1309  insulin aspart U-100 pen 0-5 Units         -- SubQ As needed (PRN) 06/10/23 1211        - further insulin titration per Endocrinology  - POC AC/HS

## 2023-06-23 NOTE — PROGRESS NOTES
Paged Dr. Penny's resident regarding consent. Patient states that she signed it, but consent is not in patient's chart.

## 2023-06-23 NOTE — ASSESSMENT & PLAN NOTE
- HHS on admission. Recent pancreatitis admission, not yet back to normal po intake with worsening hepatic function. Taking Glipizide and Farxiga at home. Never picked up Levemir Rx recently prescribed by her PCP    - 24 hour glucose trend: 120-230s    -  Continue Levemir 14 units nightly   -  Continue Novolog  6 units before meals + continue low dose correction Novolog prn     - When NPO she should not receive scheduled prandial insulin but she should still be given sliding scale insulin if indicated based on BG  - Accuchecks ac/hs while diet ordered; otherwise q4 hr while NPO    - Hypoglycemia protocol   - If blood glucose greater than 300, please ask patient not to eat food or drink anything other than water until correctional insulin has brought it back below 250    **Please notify endocrine of change in diet or plans for NPO for surgery (or any other reason) as this may change her insulin requirement**

## 2023-06-23 NOTE — PLAN OF CARE
Recommendations     1. When able, resume diet. Rec'd Diabetic/Lactose Restricted diet + Boost Glucose Control ONS. Encourage PO intake as tolerated.   2. RD to monitor & follow-up.     Goals: Meet % EEN/EPN by follow-up date.  Nutrition Goal Status: goal not met  Communication of RD Recs: other (comment) (POC)

## 2023-06-23 NOTE — ASSESSMENT & PLAN NOTE
Mariann Huff is a 63 y.o. female PMH CAD with GINGER , HTN, HLD, DM2, MURTAZA (not on CPAP), PAD, Afib and hx of DVT on Eliquis, biliary pancreatitis admitted to [facility] on 6/9/2023 for biliary pancreatitis. Now s/p ERCP on 6/15/23 with sphincterotomy/biliary tree swept and stent placed in CBD. General Surgery consulted for evaluation for cholecystectomy.     - Plan for OR today for lap adenike   - Trend labs  - Okay for DVT ppx, please continue to  hold anticoagulation  - Please call with questions

## 2023-06-23 NOTE — ASSESSMENT & PLAN NOTE
Creatinine 1.7 on presentation ; baseline 1.0  Estimated Creatinine Clearance: 33.6 mL/min (A) (based on SCr of 1.5 mg/dL (H)).  - IVFs stopped, recurrent PAPA on 6/12 with FeNA indication pre-renal disease. 1L LR bolus ordered over 3 hrs  - worsening renal function in setting of hypotension on 6/15 - ATN felt present by Nephology in conjunction with some urinary retention issues.   - repeating renal ultrasound 6/19 to assess if improved urination has improved size left hydronephrosis - improved; consulted urology who recommend checking postvoid residual  - Nephrology team consulted and have signed off now that function is improving  -encourage adequate po hydration

## 2023-06-23 NOTE — OP NOTE
[facility]  General Surgery  Operative Note    DATE: 6/23/2023    PREOPERATIVE DIAGNOSIS: Gallstone pancreatitis [K85.10]     POSTOPERATIVE DIAGNOSIS: Gallstone pancreatitis [K85.10]  Gallbladder perforation  Gallbladder necrosis      Procedure(s):  SUBTOTAL CHOLECYSTECTOMY, LAPAROSCOPIC   Modifier 22 - the difficulty and effort required for this procedure was substantially greater than typically required    Surgeon(s) and Role:     * Richard Penny MD - Primary     * Lasha Wahl MD - Resident - Assisting    ANESTHESIA: General endotracheal anesthesia    FINDINGS: Severely inflamed necrotic and perforated gallbladder. Subtotal cholecystectomy preformed. RUQ drain placed.     INDICATION: Ms. Huff is a 63 y.o. female referred to our clinic with a history of postprandial right upper quadrant abdominal pain. The history and exam were consistent with biliary pancreatitis  We went over the imaging that was obtained with Ms. Huff which revealed cholelithiasis. We discussed the procedure including postoperative course. We recommended laparoscopic cholecystectomy and she agreed to proceed. Ms. Huff signed the informed consent and expressed understanding of the risks and benefits of surgery.     PROCEDURE IN DETAIL: Patient was brought to the operating room where she was placed in supine position on the operating room table and underwent general anesthesia with endotracheal intubation without complication. The field was sterilely prepped out and draped in standard fashion, and a timeout identifying the correct patient, placement, procedure, and preoperative antibiotics was called with everyone in agreement.  An infraumbilical skin incision was made with a #11 scalpel after local anesthetic was injected, and subcutaneous tissue was bluntly dissected down with two S retractors. The umbilical stalk was grasped with a Kocher clamp and elevated. The fascia was sharply incised with a heavy scissors and  the peritoneum was bluntly entered under direct vision. A 12mm Jeanine trocar was placed and the abdomen was insufflated with carbon dioxide to a pressure of 15 mmHg without issue. A 10-mm  laparoscope was inserted and the abdomen was examined, with no evidence of injury related to entry identified. There were significant adhesions and some elevating the colon in the field of view. Adhesiolysis was performed to bring colon down off the inflamed gallbladder. Three additional 5-mm trocars were placed under direct vision through separate stab incisions after local anesthetic was injected: one subxiphoid and two in the right upper quadrant. During the course of adding additional ports multiple adhesions were taken down sharply and colon was swept away out of the working view.  We were able to proceed laparoscopically. We directed our attention to the right upper quadrant where the gallbladder was identified and noted to have  significant inflammatory changes. The fundus was grasped and retracted cranially and the infundibulum was grasped and retracted caudally and laterally. As we swept the overlying omentum off the gallbladder we realized the lower part of the infundibulum was necrotic and perforated with localized abscess in the subhepatic space. There was significant bile leakage and purulent fluid. The necrosis seemed to extend down the cystic duct. We dissected the peritoneal reflection off the infundibulum and neck of the gallbladder but given the amount of inflammation we were unable to proceed safely with isolating the cystic duct or cystic artery. The decision was made to proceed with subtotal cholecystectomy. We transected the gallbladder at the level of the infundibulum. The gallbladder was dissected off the remained gallbladder fossa using hook electrocautery until free. We then proceeded to endo loop the infundibular pedicle x2. It did not appear there was any bile leakage at the end of the case. We then  copiously irrigated the RUQ until affluent was clear. A RUQ 15Fr drain was placed using the lateral port site.  All ports were removed under direct vision and no bleeding was noted. The insufflation was evacuated. The umbilical fascia was closed with 0 Vicryl suture. Incisions were closed with  subcuticular 4-0 Monocryl and dermabond.  This completed the proposed operation. All instruments, needles, and sponge counts were reported as correct x2 by the nursing staff. Patient was extubated and awakened from general anesthesia without complication. She was sent to the recovery unit in stable condition.     Dr. Penny was present and scrubbed for the entire case.     EBL: 35mL.    COMPLICATIONS: none apparent.    SPECIMEN: gallbladder for permanent     DRAINS: 15Fr robert drain in RUQ    DISPOSITION: PACU.    6/23/2023    Lasha Wahl MD  Surgery, PGY-2

## 2023-06-23 NOTE — PROGRESS NOTES
[facility] - Telemetry [facility] Medicine  Telemedicine Progress Note    Patient Name: Mariann Huff  MRN: [id number]  Patient Class: IP- Inpatient   Admission Date: 6/9/2023  Length of Stay: 13 days  Attending Physician: Tessa Odonnell MD  Primary Care Provider: Jasbir Haney MD    Subjective:     Principal Problem:ATN (acute tubular necrosis)    HPI:  63F with PMH of CAD with GINGER , HTN, HLD, DM2, MURTAZA (not on CPAP), PAD, Afib and hx of DVT on Eliquis and recent admission for pancreatitis wo presents to the ED with nausea and vomiting. Patient recently admitted 05/25 to 05/26 for first episode of acute pancreatitis. Lipase >2000 at that time. CT abdomen and US without evidence of biliary obstruction or pancreatic inflammation. Lipid panel wnl. Patient pain and n/v improved with treatment for acute pancreatitis felt secondary to home med Trulicity. She was discharged with new insulin regimen which she reports she never picked up from pharmacy so has been taking farxiga only at home. States that she has had progressively worsening n/v and po intolerance. States that she is not having severe pain similar to recent pancreatitis. Denies fevers, chills, chest pain, shortness of breath.     In the ED patient afebrile and hemodynamically stable saturating well on room air. Lipase >2000. BG >700 and serum osmo 326 after 1L IVFs. B-hydroxybutyrate 1.3. pH 7.4 and bicarb 34. AG 12. Patient started on aggressive IVFs and insulin gtt for management on pancreatitis and HHS. Admitted to the care of medicine for further evaluation and management.       Overview/Hospital Course:  Patient transitioned from insulin drip to subcutaneous insulin on 06/10.  On 06/11, patient developed new hyperbilirubinemia and worsening liver enzymes and given history of pancreatitis concern for possible choledocholithiasis.  MRCP ordered did not demonstrate any focal obstruction showever liver enzymes and alk phos increasing in size. In  speaking with AES, patient to be off apixaban and pletal prior to ERCP.      Interval History: Virtual follow up visit for suspected Hyperglycemia [R73.9]  Chest pain [R07.9]  Non compliance w medication regimen [Z91.148]  Acute pancreatitis, unspecified complication status, unspecified pancreatitis type [K85.90]  Hyperosmolar hyperglycemic state (HHS) [E11.00] present on admission.   This service was provided by telemedicine.    Patient was transferred to [facility] on: 06/20/2023   The [facility]: [facility]   Admitted 6/9/2023  2:20 PM    The patient is able to provide adequate history. Additional history was obtained from: chart review.  No significant events overnight reported.  Patient reports complaints of nothing new  Symptoms are stable since yesterday.     I have reviewed the following on 6/22/2023:     Details     [x]   Lab results  Hypokalemia, hypomagnesemia. sCr improving. Liver enzymes stable. BGs controlled. H&H stable    []   Micro reports     []   Pathology reports     []   Imaging reports     []   Cardiology Procedure reports     []   Outside records/CareEverywhere     []  Independently viewed:       Review of Systems   Constitutional:  Negative for fever.   Respiratory:  Negative for shortness of breath.      Objective:     Vital Signs (Most Recent):  Temp: 99.5 °F (37.5 °C) (06/22/23 0747)  Pulse: 82 (06/22/23 0747)  Resp: 17 (06/22/23 0747)  BP: 139/67 (06/22/23 0747)  SpO2: 98 % (06/22/23 0747) Vital Signs (24h Range):  Temp:  [98.4 °F (36.9 °C)-99.5 °F (37.5 °C)] 99.5 °F (37.5 °C)  Pulse:  [71-90] 82  Resp:  [16-19] 17  SpO2:  [95 %-98 %] 98 %  BP: (136-181)/(67-84) 139/67     Weight: 62.8 kg (138 lb 7.2 oz)  Body mass index is 23.76 kg/m².    Intake/Output Summary (Last 24 hours) at 6/22/2023 1024  Last data filed at 6/22/2023 0423  Gross per 24 hour   Intake --   Output 700 ml   Net -700 ml         Physical Exam  Cardiovascular:      Comments: Monitor and/or Vital  signs reviewed at time of visit  Pulmonary:      Effort: Pulmonary effort is normal. No accessory muscle usage or respiratory distress.   Neurological:      Mental Status: She is alert. She is not disoriented.   Psychiatric:         Attention and Perception: Attention normal.         Behavior: Behavior is cooperative.           Significant Labs:   Recent Labs   Lab 03/29/23  0807 04/05/23  0928 05/25/23  1134   HGBA1C 8.8* 8.2* 8.1*     Recent Labs   Lab 06/22/23  1134 06/22/23  1601 06/22/23  2025   POCTGLUCOSE 140* 120* 238*     Recent Labs   Lab 06/20/23  0507 06/21/23  0355 06/22/23  0505   WBC 12.83* 12.37 16.42*   HGB 10.7* 9.6* 9.1*   HCT 32.9* 29.7* 29.2*    298 301     Recent Labs   Lab 06/20/23  0507 06/21/23  0355 06/22/23  0505   GRAN 68.0  8.7* 75.9*  9.4* 79.8*  13.1*   LYMPH 13.8*  1.8 8.2*  1.0 8.5*  1.4   MONO 10.8  1.4* 8.1  1.0 6.3  1.0   EOS 0.5 0.5 0.4     Recent Labs   Lab 06/20/23  0507 06/21/23  0355 06/22/23  0505    140 145   K 3.0* 3.0* 3.3*    106 108   CO2 24 23 25   BUN 37* 29* 20   CREATININE 2.5* 1.8* 1.5*   * 171* 83   CALCIUM 9.9 9.3 8.9   ALBUMIN 1.9* 1.9* 1.9*   MG 2.0 1.7 1.5*   PHOS 2.5* 1.9* 2.4*     Recent Labs   Lab 06/20/23  0507 06/21/23  0355 06/22/23  0505   ALKPHOS 893* 853* 887*   ALT 83* 65* 57*   AST 81* 83* 91*   PROT 7.5 6.9 6.8   BILITOT 1.9* 1.6* 1.6*     Recent Labs   Lab 06/09/23 2004 06/13/23  0813 06/14/23  2238   LACTATE 1.6 1.3 1.6     SARS-CoV2 (COVID-19) Qualitative PCR (no units)   Date Value   09/25/2021 Not Detected   12/07/2020 Not Detected     Results for orders placed during the hospital encounter of 05/25/23    Echo    Interpretation Summary  · The estimated ejection fraction is 55%.  · The quantitatively derived ejection fraction is 52%.  · Normal right ventricular size with normal right ventricular systolic function.  · Normal left ventricular diastolic function.  · The left ventricle is normal in size with  normal systolic function.  · Normal central venous pressure (3 mmHg).      US Kidney  Narrative: EXAMINATION:  US KIDNEY    CLINICAL HISTORY:  Silvino, hydronephrosis;    TECHNIQUE:  Ultrasound of the kidneys was performed including color flow and Doppler evaluation of the kidneys.    COMPARISON:  Ultrasound retroperitoneal 06/16/2023    FINDINGS:  Right kidney: The right kidney measures 12.2 cm. No cortical thinning. No loss of corticomedullary distinction. Resistive index measures 0.86.  Upper pole 0.8 x 11.0 x 0.7 cm simple cyst, previously 0.8 x 0.8 x 1.0 cm.  No mass. No renal stone. No hydronephrosis.    Left kidney: The left kidney measures 12.1 cm. No cortical thinning. No loss of corticomedullary distinction. Resistive index measures 0.86.  Upper pole 1.9 x 2.3 x 2.0 cm simple cyst, previously 2.2 x 1.9 x 12.4 cm.  Lower pole 2.0 x 2.2 x 3.2 cm minimally complex cyst with 2 mm septation, previously 2.4 x 3.1 x 2.8 cm.  No mass. No renal stone. Mild hydronephrosis, improved from prior.    Splenic resistive index is 0.83.  Impression: Mild left hydronephrosis, improved compared to prior exam.    Elevated arterial resistive indices which may represent medical renal disease.    Bilateral simple and left minimally complex renal cysts.    Electronically signed by resident: Christian Peres  Date:    06/19/2023  Time:    21:36    Electronically signed by: Tristan Santiago  Date:    06/20/2023  Time:    08:51      Labs and Imaging listed above were reviewed.       Assessment/Plan:      * ATN (acute tubular necrosis)  Patient with acute kidney injury likely due to acute tubular necrosis SILVINO is currently improving. Labs reviewed- Renal function/electrolytes with Estimated Creatinine Clearance: 33.6 mL/min (A) (based on SCr of 1.5 mg/dL (H)). according to latest data. Monitor urine output and serial BMP and adjust therapy as needed. Avoid nephrotoxins and renally dose meds for GFR listed above.     History of pancreatitis  See  "acute pancreatitis    Acute pancreatitis  Lipase >2000 on presentation, downtrending without any identifiable cause for elevation. Patient without any abdominal pain. Discontinued HCTZ as it could potentially be cause of pancreatitis as well  - US abdomen with gallbladder sludge and "prominence" of the pancreatic duct   - liver enzymes, alk-phos, bilirubin elevated on 06/11, suggestive of possible choledocholithiasis as cause of pancreatitis.  MRCP ordered  - MRCP on my personal interpretation without any focal obstruction or retained stones, however liver enzymes continue to elevate  - AES was consulted for findings and after discussion, recommended ERCP/EUS once off apixaban and Pletal which was completed on 6/15 - biliary sludging found, CBD dilated.   - General surgery assessed for cholecystectomy - planned for 6/23/2023 with apixaban held  - resume Zosyn due to elevated WBC and alk phos; bland diet    Hyperosmolar hyperglycemic state (HHS)  See Diabetes    Type 2 diabetes mellitus with chronic kidney disease  IP HHS/DKA Pathway initiated. On presentation BG >700 and serum osmo 326 after 1L IVFs. B-hydroxybutyrate 1.3. pH 7.4 and bicarb 34. AG 12. Patient was started on insulin drip on admission but later in the day, patient transitioned to Levemir 14 units at night and aspart 3 units with meals.  Started on diabetic diet  - patient currently on   Antihyperglycemics (From admission, onward)    Start     Stop Route Frequency Ordered    06/22/23 2100  insulin detemir U-100 (Levemir) pen 14 Units         -- SubQ Nightly 06/22/23 0946    06/22/23 1130  insulin aspart U-100 pen 6 Units         -- SubQ 3 times daily with meals 06/22/23 1010    06/10/23 1315  insulin regular in 0.9 % NaCl 100 unit/100 mL (1 unit/mL) infusion        Question:  Enter initial dose (Units/hr):  Answer:  0.6    06/10 2200 IV Continuous 06/10/23 1211    06/10/23 1309  insulin aspart U-100 pen 0-5 Units         -- SubQ As needed (PRN) " 06/10/23 1211        - further insulin titration per Endocrinology  - POC AC/HS    Hyperlipidemia associated with type 2 diabetes mellitus  Holding statin    Hypertension associated with diabetes  Restarting home amlodipine on 6/19  -holding ARB due to PAPA  -resumed nitrate with hydralazine  -continue to monitor and adjust regimen as necessary    PAPA (acute kidney injury)  Creatinine 1.7 on presentation ; baseline 1.0  Estimated Creatinine Clearance: 33.6 mL/min (A) (based on SCr of 1.5 mg/dL (H)).  - IVFs stopped, recurrent PAPA on 6/12 with FeNA indication pre-renal disease. 1L LR bolus ordered over 3 hrs  - worsening renal function in setting of hypotension on 6/15 - ATN felt present by Nephology in conjunction with some urinary retention issues.   - repeating renal ultrasound 6/19 to assess if improved urination has improved size left hydronephrosis - improved; consulted urology who recommend checking postvoid residual  - Nephrology team consulted and have signed off now that function is improving  -encourage adequate po hydration    Asthma  - albuterol prn  - stable on room air    History of DVT (deep vein thrombosis)  - UE/LE DVT   -hold apixaban in anticipation of lap adenike; bridge with lovenox until surgery      PAD (peripheral artery disease)  - hold Pletal, ASA, and statin  - started on apixaban for PAD outpatient as well as A-fib  - held apixaban for ERCP and again for lap adenike    Hydronephrosis  - repeat renal ultrasound ordered for 6/19 with improvement  -urology does not plan any intervention currently  -post void residual with 0 mL volume on bladder scan    Hypokalemia  -initially hyperkalemia at admit but developed hypokalemia as renal function improved  -very judicious repletion of potassium due to history of hyperkalemia/PAPA    Hypophosphatemia  Replete as indicated to goal phos of 3  Continue to monitor.  Patient does not like hospital food and is not eating well    Hyperkalemia  Due to PAPA,  resolving    Hyperbilirubinemia  - AES completed ERCP with intervention   -improving    Discharge planning  Diet: Diet [facility]; Lactose Restricted, Consistent Carbohydrate  Diet NPO Except for: Medication  Significant LDAs:   IV Access Type: Peripheral  Urinary Catheter Indication if present: Patient Does Not Have Urinary Catheter  Other Lines/Tubes/Drains:     Goals of Care:    Previous admission:  5/25/23  Code Status: Full Code  Likely prognosis:  Good  Advance Care Planning   Date: 06/21/2023  Patient wishes to continue curative therapies      REBECCA: 6/23/2023     Code Status: Full Code   Is the patient medically ready for discharge?: No    Reason for patient still in hospital (select all that apply): Patient trending condition, Laboratory test, Treatment, Consult recommendations and Other (specify) Lap Aimee on 6/23/23  Discharge Plan A: Home Health   Discharge Delays: None known at this time    The amount of time spent during, and associated with, this encounter was 40 minutes; the nature of the activities performed were:  Preparing to see the patient, chart review  Obtaining and/or receiving separately obtained history   Performing medically appropriate examination and evaluation  Counseling and educating the patient/family/caregiver  Ordering medications, tests, or procedures  Referring to and communicating with other health care professionals  Documenting clinical information in the EHR  Independently interpreting results  Care coordination    Acute encephalopathy  Perhaps related to sepsis.  Patient on 6/14 a.m., increasingly lethargic.  Per , last night she had jerking movements when attempting to go to the restroom with assistance.  On exam this morning she had asterixis and required sternal rubbing to awaken her.  - Ammonia was ordered and was normal for the past 2 days, patient has not had a bowel movement in over a week per the patient's .  We will give lactulose enema  regardless of ammonia level  - VBG ordered, personal interpretation reflects no evidence of hypercapnic respiratory failure  - blood cultures were collected yesterday x2, no growth today  - escalated patient's antibiotics from ceftriaxone and metronidazole to Zosyn given her increasing white blood cell count        Active Hospital Problems    Diagnosis  POA    *ATN (acute tubular necrosis) [N17.0]  Yes     Priority: 4     History of pancreatitis [Z87.19]  Not Applicable     Priority: 1 - High    Acute pancreatitis [K85.90]  Yes     Priority: 1 - High    Hyperosmolar hyperglycemic state (HHS) [E11.00]  Yes     Priority: 2     Type 2 diabetes mellitus with chronic kidney disease [E11.22]  Yes     Priority: 2     Hypertension associated with diabetes [E11.59, I15.2]  Yes     Priority: 3      Chronic    Hyperlipidemia associated with type 2 diabetes mellitus [E11.69, E78.5]  Yes     Priority: 3      Chronic    PAPA (acute kidney injury) [N17.9]  Yes     Priority: 4     Asthma [J45.909]  Yes     Priority: 7      Chronic    History of DVT (deep vein thrombosis) [Z86.718]  Not Applicable     Priority: 10     PAD (peripheral artery disease) [I73.9]  Yes     Priority: 11      Chronic    Hydronephrosis [N13.30]  Yes     Priority: 17     Hypokalemia [E87.6]  Yes     Priority: 23     Hypophosphatemia [E83.39]  Yes     Priority: 24     Hyperkalemia [E87.5]  Yes     Priority: 24     Hyperbilirubinemia [E80.6]  Yes     Priority: 24     Discharge planning [Z02.9]  Not Applicable     Priority: 25 - Low      Resolved Hospital Problems    Diagnosis Date Resolved POA    Sepsis with acute liver failure without hepatic coma or septic shock [A41.9, R65.20, K72.00] 06/19/2023 No       Inpatient Medications Prescribed for Management of Current Problems:     Scheduled Meds:    amLODIPine  10 mg Oral Daily    aspirin  81 mg Oral Daily    enoxparin  1 mg/kg Subcutaneous Q24H (treatment, non-standard time)    famotidine  20  mg Oral Daily    hydrALAZINE  25 mg Oral Q12H    insulin aspart U-100  6 Units Subcutaneous TIDWM    insulin detemir U-100  14 Units Subcutaneous QHS    isosorbide dinitrate  10 mg Oral BID    piperacillin-tazobactam (Zosyn) IV (PEDS and ADULTS) (extended infusion is not appropriate)  4.5 g Intravenous Q8H     Continuous Infusions:   As Needed: acetaminophen, albuterol, dextrose 10%, dextrose 10%, dextrose, dextrose, glucagon (human recombinant), hydrALAZINE, insulin aspart U-100, melatonin, ondansetron, prochlorperazine, sodium chloride 0.9%    VTE Risk Mitigation (From admission, onward)         Ordered     enoxaparin injection 60 mg  Every 24 hours         06/21/23 1127              I have completed this tele-visit with the assistance of a telepresenter.    The attending portion of this evaluation, treatment, and documentation was performed per Tessa Odonnell MD via Telemedicine AudioVisual using the secure Man[employer] software platform with 2 way audio/video. The provider was located off-site and the patient is located in the hospital. The aforementioned video software was utilized to document the relevant history and physical exam    Tessa Odonnell MD  Department of Hospital Medicine   [facility] - Telemetry Stepdown

## 2023-06-23 NOTE — ASSESSMENT & PLAN NOTE
Replete as indicated to goal phos of 3  Continue to monitor.  Patient does not like hospital food and is not eating well

## 2023-06-23 NOTE — ASSESSMENT & PLAN NOTE
Restarting home amlodipine on 6/19  -holding ARB due to PAPA  -resumed nitrate with hydralazine  -continue to monitor and adjust regimen as necessary

## 2023-06-23 NOTE — BRIEF OP NOTE
[facility] - Surgery (Formerly [facility])  Brief Operative Note    SUMMARY     Surgery Date: 6/23/2023     Surgeon(s) and Role:     * Sylvain Scanlon MD - Primary     * Lasha Wahl MD - Resident - Assisting        Pre-op Diagnosis:  Gallstone pancreatitis [K85.10]    Post-op Diagnosis:  Post-Op Diagnosis Codes:     * Gallstone pancreatitis [K85.10]    Procedure(s) (LRB):  CHOLECYSTECTOMY, LAPAROSCOPIC (N/A)    Anesthesia: General    Operative Findings: Inflamed and necrotic gallbladder. Subtotal cholecystectomy. Infundibular pedicle endo loop x2. 15Fr Carson in place.     Estimated Blood Loss: 35ml     Estimated Blood Loss has been documented.         Specimens:   Specimen (24h ago, onward)       Start     Ordered    06/23/23 1355  Specimen to Pathology, Surgery General Surgery  Once        Comments: Pre-op Diagnosis: Gallstone pancreatitis [K85.10]Procedure(s):CHOLECYSTECTOMY, LAPAROSCOPIC Number of specimens: 1Name of specimens: 1. Gallbladder-permanent     References:    Click here for ordering Quick Tip   Question Answer Comment   Procedure Type: General Surgery    Specimen Class: Routine/Screening    Which provider would you like to cc? SYLVAIN SCANLON    Release to patient Immediate        06/23/23 3666                    [id number]

## 2023-06-23 NOTE — ASSESSMENT & PLAN NOTE
- She has now had two episodes of pancreatitis on Trulicity, however dx of biliary pancreatitis per primary and GI based on eval this admission   - Episodes of pancreatitis likely multifactorial with GLP1 as contributing factor  - Laparoscopic cholecystectomy scheduled Friday 6/23   - [facility]

## 2023-06-23 NOTE — PLAN OF CARE
Problem: Adult Inpatient Plan of Care  Goal: Patient-Specific Goal (Individualized)  Outcome: Ongoing, Progressing     Problem: Diabetic Ketoacidosis  Goal: Fluid and Electrolyte Balance with Absence of Ketosis  Outcome: Ongoing, Progressing     Problem: Adjustment to Illness (Sepsis/Septic Shock)  Goal: Optimal Coping  Outcome: Ongoing, Progressing     Pt AAOx4. VSS. RA. No c/o pain. Zosyn given. Pt ambulates to bathroom independently.  at bedside. NPO since midnight. Planned for a cholecystectomy today. Pre op check completed. Safety maintained. Call bell within reach.

## 2023-06-23 NOTE — ASSESSMENT & PLAN NOTE
Patient with acute kidney injury likely due to acute tubular necrosis PAPA is currently improving. Labs reviewed- Renal function/electrolytes with Estimated Creatinine Clearance: 33.6 mL/min (A) (based on SCr of 1.5 mg/dL (H)). according to latest data. Monitor urine output and serial BMP and adjust therapy as needed. Avoid nephrotoxins and renally dose meds for GFR listed above.

## 2023-06-24 LAB
ALBUMIN SERPL BCP-MCNC: 1.7 G/DL (ref 3.5–5.2)
ALP SERPL-CCNC: 719 U/L (ref 55–135)
ALT SERPL W/O P-5'-P-CCNC: 55 U/L (ref 10–44)
ANION GAP SERPL CALC-SCNC: 14 MMOL/L (ref 8–16)
AST SERPL-CCNC: 173 U/L (ref 10–40)
BASOPHILS # BLD AUTO: 0.03 K/UL (ref 0–0.2)
BASOPHILS NFR BLD: 0.2 % (ref 0–1.9)
BILIRUB SERPL-MCNC: 1.1 MG/DL (ref 0.1–1)
BUN SERPL-MCNC: 17 MG/DL (ref 8–23)
CALCIUM SERPL-MCNC: 8.3 MG/DL (ref 8.7–10.5)
CHLORIDE SERPL-SCNC: 106 MMOL/L (ref 95–110)
CO2 SERPL-SCNC: 23 MMOL/L (ref 23–29)
CREAT SERPL-MCNC: 1.4 MG/DL (ref 0.5–1.4)
DIFFERENTIAL METHOD: ABNORMAL
EOSINOPHIL # BLD AUTO: 0 K/UL (ref 0–0.5)
EOSINOPHIL NFR BLD: 0.1 % (ref 0–8)
ERYTHROCYTE [DISTWIDTH] IN BLOOD BY AUTOMATED COUNT: 15.7 % (ref 11.5–14.5)
EST. GFR  (NO RACE VARIABLE): 42.5 ML/MIN/1.73 M^2
GLUCOSE SERPL-MCNC: 208 MG/DL (ref 70–110)
HCT VFR BLD AUTO: 24.3 % (ref 37–48.5)
HGB BLD-MCNC: 7.9 G/DL (ref 12–16)
IMM GRANULOCYTES # BLD AUTO: 0.22 K/UL (ref 0–0.04)
IMM GRANULOCYTES NFR BLD AUTO: 1.3 % (ref 0–0.5)
LYMPHOCYTES # BLD AUTO: 1.4 K/UL (ref 1–4.8)
LYMPHOCYTES NFR BLD: 8.3 % (ref 18–48)
MAGNESIUM SERPL-MCNC: 1.6 MG/DL (ref 1.6–2.6)
MCH RBC QN AUTO: 26.6 PG (ref 27–31)
MCHC RBC AUTO-ENTMCNC: 32.5 G/DL (ref 32–36)
MCV RBC AUTO: 82 FL (ref 82–98)
MONOCYTES # BLD AUTO: 1.1 K/UL (ref 0.3–1)
MONOCYTES NFR BLD: 6.3 % (ref 4–15)
NEUTROPHILS # BLD AUTO: 14.6 K/UL (ref 1.8–7.7)
NEUTROPHILS NFR BLD: 83.8 % (ref 38–73)
NRBC BLD-RTO: 0 /100 WBC
PHOSPHATE SERPL-MCNC: 2.9 MG/DL (ref 2.7–4.5)
PLATELET # BLD AUTO: 310 K/UL (ref 150–450)
PMV BLD AUTO: 11.2 FL (ref 9.2–12.9)
POCT GLUCOSE: 118 MG/DL (ref 70–110)
POCT GLUCOSE: 189 MG/DL (ref 70–110)
POCT GLUCOSE: 223 MG/DL (ref 70–110)
POCT GLUCOSE: 227 MG/DL (ref 70–110)
POTASSIUM SERPL-SCNC: 4.1 MMOL/L (ref 3.5–5.1)
PROT SERPL-MCNC: 6.3 G/DL (ref 6–8.4)
RBC # BLD AUTO: 2.97 M/UL (ref 4–5.4)
SODIUM SERPL-SCNC: 143 MMOL/L (ref 136–145)
WBC # BLD AUTO: 17.44 K/UL (ref 3.9–12.7)

## 2023-06-24 PROCEDURE — 63600175 PHARM REV CODE 636 W HCPCS: Performed by: INTERNAL MEDICINE

## 2023-06-24 PROCEDURE — 99233 SBSQ HOSP IP/OBS HIGH 50: CPT | Mod: GT,,, | Performed by: INTERNAL MEDICINE

## 2023-06-24 PROCEDURE — 25000003 PHARM REV CODE 250: Performed by: STUDENT IN AN ORGANIZED HEALTH CARE EDUCATION/TRAINING PROGRAM

## 2023-06-24 PROCEDURE — 99232 PR SUBSEQUENT HOSPITAL CARE,LEVL II: ICD-10-PCS | Mod: ,,, | Performed by: INTERNAL MEDICINE

## 2023-06-24 PROCEDURE — 25000003 PHARM REV CODE 250

## 2023-06-24 PROCEDURE — 25000003 PHARM REV CODE 250: Performed by: INTERNAL MEDICINE

## 2023-06-24 PROCEDURE — 94761 N-INVAS EAR/PLS OXIMETRY MLT: CPT

## 2023-06-24 PROCEDURE — 25000003 PHARM REV CODE 250: Performed by: HOSPITALIST

## 2023-06-24 PROCEDURE — 99233 PR SUBSEQUENT HOSPITAL CARE,LEVL III: ICD-10-PCS | Mod: GT,,, | Performed by: INTERNAL MEDICINE

## 2023-06-24 PROCEDURE — 85025 COMPLETE CBC W/AUTO DIFF WBC: CPT | Performed by: FAMILY MEDICINE

## 2023-06-24 PROCEDURE — 99232 SBSQ HOSP IP/OBS MODERATE 35: CPT | Mod: ,,, | Performed by: INTERNAL MEDICINE

## 2023-06-24 PROCEDURE — 84100 ASSAY OF PHOSPHORUS: CPT | Performed by: FAMILY MEDICINE

## 2023-06-24 PROCEDURE — 80053 COMPREHEN METABOLIC PANEL: CPT | Performed by: STUDENT IN AN ORGANIZED HEALTH CARE EDUCATION/TRAINING PROGRAM

## 2023-06-24 PROCEDURE — 83735 ASSAY OF MAGNESIUM: CPT | Performed by: FAMILY MEDICINE

## 2023-06-24 PROCEDURE — 36415 COLL VENOUS BLD VENIPUNCTURE: CPT | Performed by: FAMILY MEDICINE

## 2023-06-24 PROCEDURE — 20600001 HC STEP DOWN PRIVATE ROOM

## 2023-06-24 RX ORDER — ENOXAPARIN SODIUM 100 MG/ML
30 INJECTION SUBCUTANEOUS EVERY 24 HOURS
Status: DISCONTINUED | OUTPATIENT
Start: 2023-06-25 | End: 2023-06-25 | Stop reason: HOSPADM

## 2023-06-24 RX ORDER — INSULIN ASPART 100 [IU]/ML
6 INJECTION, SOLUTION INTRAVENOUS; SUBCUTANEOUS
Status: DISCONTINUED | OUTPATIENT
Start: 2023-06-25 | End: 2023-06-25

## 2023-06-24 RX ORDER — INSULIN ASPART 100 [IU]/ML
7 INJECTION, SOLUTION INTRAVENOUS; SUBCUTANEOUS
Status: DISCONTINUED | OUTPATIENT
Start: 2023-06-24 | End: 2023-06-24

## 2023-06-24 RX ADMIN — INSULIN ASPART 7 UNITS: 100 INJECTION, SOLUTION INTRAVENOUS; SUBCUTANEOUS at 05:06

## 2023-06-24 RX ADMIN — ASPIRIN 81 MG 81 MG: 81 TABLET ORAL at 08:06

## 2023-06-24 RX ADMIN — HYDRALAZINE HYDROCHLORIDE 25 MG: 25 TABLET, FILM COATED ORAL at 08:06

## 2023-06-24 RX ADMIN — FAMOTIDINE 20 MG: 20 TABLET, FILM COATED ORAL at 08:06

## 2023-06-24 RX ADMIN — PIPERACILLIN SODIUM AND TAZOBACTAM SODIUM 4.5 G: 4; .5 INJECTION, POWDER, LYOPHILIZED, FOR SOLUTION INTRAVENOUS at 08:06

## 2023-06-24 RX ADMIN — HYDROMORPHONE HYDROCHLORIDE 0.5 MG: 1 INJECTION, SOLUTION INTRAMUSCULAR; INTRAVENOUS; SUBCUTANEOUS at 05:06

## 2023-06-24 RX ADMIN — ISOSORBIDE DINITRATE 10 MG: 10 TABLET ORAL at 08:06

## 2023-06-24 RX ADMIN — PIPERACILLIN SODIUM AND TAZOBACTAM SODIUM 4.5 G: 4; .5 INJECTION, POWDER, LYOPHILIZED, FOR SOLUTION INTRAVENOUS at 01:06

## 2023-06-24 RX ADMIN — INSULIN ASPART 7 UNITS: 100 INJECTION, SOLUTION INTRAVENOUS; SUBCUTANEOUS at 08:06

## 2023-06-24 RX ADMIN — AMLODIPINE BESYLATE 10 MG: 10 TABLET ORAL at 08:06

## 2023-06-24 RX ADMIN — PIPERACILLIN SODIUM AND TAZOBACTAM SODIUM 4.5 G: 4; .5 INJECTION, POWDER, LYOPHILIZED, FOR SOLUTION INTRAVENOUS at 05:06

## 2023-06-24 RX ADMIN — OXYCODONE HYDROCHLORIDE 5 MG: 5 TABLET ORAL at 08:06

## 2023-06-24 RX ADMIN — INSULIN ASPART 7 UNITS: 100 INJECTION, SOLUTION INTRAVENOUS; SUBCUTANEOUS at 12:06

## 2023-06-24 NOTE — ASSESSMENT & PLAN NOTE
PAPA 2/2 ATN  Improving -- Cr now 1.4 improving -- this may account for increasing insulin requirements   Keep renal function in mind when adjusting insulin doses as renal clearance impacts insulin requirement

## 2023-06-24 NOTE — ASSESSMENT & PLAN NOTE
Diet: Diet clear liquid [facility]; Consistent Carbohydrate  Significant LDAs:   IV Access Type: Peripheral  Urinary Catheter Indication if present: Patient Does Not Have Urinary Catheter  Other Lines/Tubes/Drains:     Goals of Care:    Previous admission:  5/25/23  Code Status: Full Code  Likely prognosis:  Good  Advance Care Planning   Date: 06/21/2023  Patient wishes to continue curative therapies       REBECCA: 6/27/2023     Code Status: Full Code   Is the patient medically ready for discharge?: No    Reason for patient still in hospital (select all that apply): Patient trending condition, Laboratory test, Treatment, Consult recommendations and Other (specify) Ramy Yu on 6/23/23  Discharge Plan A: Home Health   Discharge Delays: None known at this time    The amount of time spent during, and associated with, this encounter was 50 minutes; the nature of the activities performed were:  Preparing to see the patient, chart review  Obtaining and/or receiving separately obtained history   Performing medically appropriate examination and evaluation  Counseling and educating the patient/family/caregiver  Ordering medications, tests, or procedures  Referring to and communicating with other health care professionals  Documenting clinical information in the EHR  Independently interpreting results  Care coordination

## 2023-06-24 NOTE — NURSING
Bedside handoff report completed pt laying in bed in left side lying position arouses to voice stimuli resp even and unlabored no distress noted at this time safety precautions maintained.

## 2023-06-24 NOTE — ASSESSMENT & PLAN NOTE
Creatinine 1.7 on presentation ; baseline 1.0  Estimated Creatinine Clearance: 36 mL/min (based on SCr of 1.4 mg/dL).  - IVFs stopped, recurrent PAPA on 6/12 with FeNA indication pre-renal disease. 1L LR bolus ordered over 3 hrs  - worsening renal function in setting of hypotension on 6/15 - ATN felt present by Nephology in conjunction with some urinary retention issues.   - repeating renal ultrasound 6/19 to assess if improved urination has improved size left hydronephrosis - improved; consulted Urology who recommend checking postvoid residual  - Nephrology team consulted and have signed off now that function is improving  -encourage adequate po hydration

## 2023-06-24 NOTE — ASSESSMENT & PLAN NOTE
Mariann Huff is a 63 y.o. female PMH CAD with GINGER , HTN, HLD, DM2, MURTAZA (not on CPAP), PAD, Afib and hx of DVT on Eliquis, biliary pancreatitis admitted to [facility] on 6/9/2023 for biliary pancreatitis. Now s/p ERCP on 6/15/23 with sphincterotomy/biliary tree swept and stent placed in CBD. General Surgery consulted for evaluation for cholecystectomy now s/p laparoscopic subtotal cholecystectomy with drain placement on 6/23/23.    - Keep clears for now  -Keep on Abx for at least 48 hours   -Record drain output   -Trend labs  - Okay for DVT ppx, hold anticoagulation for now. Likely okay tomorrow.   - Please call with questions

## 2023-06-24 NOTE — SUBJECTIVE & OBJECTIVE
Interval History: Virtual follow up visit for suspected Hyperglycemia [R73.9]  Chest pain [R07.9]  Non compliance w medication regimen [Z91.148]  Acute pancreatitis, unspecified complication status, unspecified pancreatitis type [K85.90]  Hyperosmolar hyperglycemic state (HHS) [E11.00] present on admission.   This service was provided by telemedicine.    Patient was transferred to [facility] on: 06/20/2023   The patient location is: [facility]   Admitted 6/9/2023  2:20 PM    The patient is able to provide adequate history. Additional history was obtained from: chart review.  No significant events overnight reported.  Patient reports complaints of post-op pain. Incisional bleeding resolved after stitch placed by Gen Surg last night.  Symptoms are decreasing in both severity and frequency since yesterday.     I have reviewed the following on 6/24/2023:     Details     [x]   Lab results  sCr improved. Liver enzymes stable/improving. BGs > 200. H&H down-trending. WBC elevated.    []   Micro reports     []   Pathology reports     []   Imaging reports     []   Cardiology Procedure reports     []   Outside records/CareEverywhere     []  Independently viewed:       Review of Systems   Constitutional:  Negative for fever.   Respiratory:  Negative for shortness of breath.      Objective:     Vital Signs (Most Recent):  Temp: 97.8 °F (36.6 °C) (06/24/23 0804)  Pulse: 79 (06/24/23 0804)  Resp: 18 (06/24/23 0804)  BP: (!) 169/77 (06/24/23 0804)  SpO2: 95 % (06/24/23 1022) Vital Signs (24h Range):  Temp:  [96 °F (35.6 °C)-97.8 °F (36.6 °C)] 97.8 °F (36.6 °C)  Pulse:  [73-89] 79  Resp:  [10-24] 18  SpO2:  [94 %-100 %] 95 %  BP: (130-169)/(62-77) 169/77     Weight: 62.8 kg (138 lb 7.2 oz)  Body mass index is 23.76 kg/m².    Intake/Output Summary (Last 24 hours) at 6/24/2023 1029  Last data filed at 6/24/2023 1022  Gross per 24 hour   Intake 1840 ml   Output 50 ml   Net 1790 ml           Physical  Exam  Cardiovascular:      Comments: Monitor and/or Vital signs reviewed at time of visit  Pulmonary:      Effort: Pulmonary effort is normal. No accessory muscle usage or respiratory distress.   Neurological:      Mental Status: She is alert. She is not disoriented.   Psychiatric:         Attention and Perception: Attention normal.         Behavior: Behavior is cooperative.           Significant Labs:   Recent Labs   Lab 03/29/23  0807 04/05/23  0928 05/25/23  1134   HGBA1C 8.8* 8.2* 8.1*       Recent Labs   Lab 06/23/23  1819 06/23/23  2115 06/24/23  0807   POCTGLUCOSE 259* 283* 223*       Recent Labs   Lab 06/22/23  0505 06/23/23  0330 06/24/23  0447   WBC 16.42* 16.01* 17.44*   HGB 9.1* 8.6* 7.9*   HCT 29.2* 27.6* 24.3*    298 310       Recent Labs   Lab 06/22/23  0505 06/23/23  0330 06/24/23  0447   GRAN 79.8*  13.1* 75.8*  12.1* 83.8*  14.6*   LYMPH 8.5*  1.4 12.2*  2.0 8.3*  1.4   MONO 6.3  1.0 8.0  1.3* 6.3  1.1*   EOS 0.4 0.3 0.0       Recent Labs   Lab 06/22/23  0505 06/23/23  0330 06/24/23  0447    142 143   K 3.3* 4.2 4.1    105 106   CO2 25 26 23   BUN 20 18 17   CREATININE 1.5* 1.7* 1.4   GLU 83 172* 208*   CALCIUM 8.9 9.0 8.3*   ALBUMIN 1.9* 1.8* 1.7*   MG 1.5* 1.6 1.6   PHOS 2.4* 2.2* 2.9       Recent Labs   Lab 06/22/23  0505 06/23/23  0330 06/24/23  0447   ALKPHOS 887* 810* 719*   ALT 57* 50* 55*   AST 91* 75* 173*   PROT 6.8 6.7 6.3   BILITOT 1.6* 1.4* 1.1*       Recent Labs   Lab 06/09/23 2004 06/13/23  0813 06/14/23  2238   LACTATE 1.6 1.3 1.6       SARS-CoV2 (COVID-19) Qualitative PCR (no units)   Date Value   09/25/2021 Not Detected   12/07/2020 Not Detected     Results for orders placed during the hospital encounter of 05/25/23    Echo    Interpretation Summary  · The estimated ejection fraction is 55%.  · The quantitatively derived ejection fraction is 52%.  · Normal right ventricular size with normal right ventricular systolic function.  · Normal left  ventricular diastolic function.  · The left ventricle is normal in size with normal systolic function.  · Normal central venous pressure (3 mmHg).      US Kidney  Narrative: EXAMINATION:  US KIDNEY    CLINICAL HISTORY:  Silvino, hydronephrosis;    TECHNIQUE:  Ultrasound of the kidneys was performed including color flow and Doppler evaluation of the kidneys.    COMPARISON:  Ultrasound retroperitoneal 06/16/2023    FINDINGS:  Right kidney: The right kidney measures 12.2 cm. No cortical thinning. No loss of corticomedullary distinction. Resistive index measures 0.86.  Upper pole 0.8 x 11.0 x 0.7 cm simple cyst, previously 0.8 x 0.8 x 1.0 cm.  No mass. No renal stone. No hydronephrosis.    Left kidney: The left kidney measures 12.1 cm. No cortical thinning. No loss of corticomedullary distinction. Resistive index measures 0.86.  Upper pole 1.9 x 2.3 x 2.0 cm simple cyst, previously 2.2 x 1.9 x 12.4 cm.  Lower pole 2.0 x 2.2 x 3.2 cm minimally complex cyst with 2 mm septation, previously 2.4 x 3.1 x 2.8 cm.  No mass. No renal stone. Mild hydronephrosis, improved from prior.    Splenic resistive index is 0.83.  Impression: Mild left hydronephrosis, improved compared to prior exam.    Elevated arterial resistive indices which may represent medical renal disease.    Bilateral simple and left minimally complex renal cysts.    Electronically signed by resident: Christian Peres  Date:    06/19/2023  Time:    21:36    Electronically signed by: Tristan Santiago  Date:    06/20/2023  Time:    08:51      Labs and Imaging listed above were reviewed.

## 2023-06-24 NOTE — ASSESSMENT & PLAN NOTE
"Lipase >2000 on presentation, downtrending without any identifiable cause for elevation. Patient without any abdominal pain. Discontinued HCTZ as it could potentially be cause of pancreatitis as well  - US abdomen with gallbladder sludge and "prominence" of the pancreatic duct   - liver enzymes, alk-phos, bilirubin elevated on 06/11, suggestive of possible choledocholithiasis as cause of pancreatitis.  MRCP ordered  - MRCP on my personal interpretation without any focal obstruction or retained stones, however liver enzymes continue to elevate  - AES was consulted for findings and after discussion, recommended ERCP/EUS once off apixaban and Pletal which was completed on 6/15 - biliary sludging found, CBD dilated.   - General surgery assessed for cholecystectomy - lap adenike 6/23/2023 with apixaban held (post-op incisional bleeding discussed with Gen Surg  - resume Zosyn due to elevated WBC and alk phos; bland diet  "

## 2023-06-24 NOTE — ASSESSMENT & PLAN NOTE
"Lipase >2000 on presentation, downtrending without any identifiable cause for elevation. Patient without any abdominal pain. Discontinued HCTZ as it could potentially be cause of pancreatitis as well  - US abdomen with gallbladder sludge and "prominence" of the pancreatic duct   - liver enzymes, alk-phos, bilirubin elevated on 06/11, suggestive of possible choledocholithiasis as cause of pancreatitis.  MRCP ordered  - MRCP on my personal interpretation without any focal obstruction or retained stones, however liver enzymes continue to elevate  - AES was consulted for findings and after discussion, recommended ERCP/EUS once off apixaban and Pletal which was completed on 6/15 - biliary sludging found, CBD dilated.   - resumed Zosyn due to elevated WBC and alk phos; bland diet  - General surgery assessed for cholecystectomy - lap adenike 6/23/2023 with apixaban held post-op - incisional bleeding discussed with Gen Surg  - patient s/p lap adenike on 6/23: per Gen Surg: clears for now, antibiotics for at least 48 hours, monitor drain output, Okay for DVT ppx, continue to  hold anticoagulation.  "

## 2023-06-24 NOTE — ASSESSMENT & PLAN NOTE
IP HHS/DKA Pathway initiated. On presentation BG >700 and serum osmo 326 after 1L IVFs. B-hydroxybutyrate 1.3. pH 7.4 and bicarb 34. AG 12. Patient was started on insulin drip on admission but later in the day, patient transitioned to Levemir 14 units at night and aspart 3 units with meals.  Started on diabetic diet  - patient currently on per Endocrinology:  Antihyperglycemics (From admission, onward)    Start     Stop Route Frequency Ordered    06/24/23 2100  insulin detemir U-100 (Levemir) pen 16 Units         -- SubQ Nightly 06/24/23 1248    06/24/23 0715  insulin aspart U-100 pen 7 Units         -- SubQ 3 times daily with meals 06/24/23 0618    06/23/23 2145  insulin aspart U-100 pen 0-5 Units         -- SubQ Before meals & nightly PRN 06/23/23 2045    06/10/23 1315  insulin regular in 0.9 % NaCl 100 unit/100 mL (1 unit/mL) infusion        Question:  Enter initial dose (Units/hr):  Answer:  0.6    06/10 2200 IV Continuous 06/10/23 1211        - further insulin titration per Endocrinology  - POC AC/HS

## 2023-06-24 NOTE — PROGRESS NOTES
[facility] - Telemetry Stepdown  Endocrinology  Progress Note    Admit Date: 2023     63 year old  female with type 2 diabetes mellitus, CKDIV, CAD, hypertension, hyperlipidemia, MURTAZA (not on CPAP), PAD, Afib and hx of DVT on Eliquis and recent admission for pancreatitis who presents with HHS and developing possible biliary obstruction.     - Patient recently admitted  to  for acute pancreatitis attributed to Trulicity, which was discontinued upon d/c. Per pt's PCP this was 2nd episode of pancreatitis while on trulicity -- prior episode when increasing dose of Trulicity.   Pt states upon d/c  she was feeling slight improvement but still having intermittent n/v and not tolerating normal diet. N/V began to get worse in last few days which prompted admission. No abdominal pain on presentation for current admission, unlike last admit when she had severe abd pain for pancreatitis.    In the ED patient afebrile and hemodynamically stable saturating well on room air. Lipase >2000. BG >700 and serum osmo 326 after 1L IVFs. B-hydroxybutyrate 1.3. pH 7.4 and bicarb 34. AG 12. Patient started on aggressive IVFs and insulin gtt for HHS.     - Endocrinology consulted for initial HHS and glucose management      Regarding Type 2 Diabetes Mellitus:    - Initially diagnosed with Type 2 diabetes mellitus: Over 20 years prior  - Home diabetic medications include: Farxiga 10mg qd, Glipizide ER 10mg daily, Levemir 20u qhs (but never started). She has been on MDI previously, most recently she was on Toujeo and Humalog discontinued 2021 but she states she was on insulin up until about 10mo ago.  - Family History: Not asked  - Weight based dosin kg x 0.4 (CKD) = 25 TDD x 0.5 = 12 basal / 12 prandial  - 1700/TDD = 68 (estimated insulin sensitivity factor)  - 450/TDD = 18 (estimated starting carb ratio for prandial dosing)    Lab Results   Component Value Date    HGBA1C 8.1 (H) 2023    HGBA1C 8.2  "(H) 04/05/2023    HGBA1C 8.8 (H) 03/29/2023    CPEPTIDE 1.36 06/12/2017     Lab Results   Component Value Date    EGFRNORACEVR 12.8 (A) 06/18/2023    EGFRNORACEVR 16.4 (A) 06/17/2023    EGFRNORACEVR 19.3 (A) 06/16/2023       Interval HPI:   Overnight events: s/p lap adenike on 06/23 with steroid given in OR.  Some increase BG overnight in setting of steroids.  No new overnight issues.  BG in 200s this AM.      Eating:   <25%  Nausea: No  Hypoglycemia and intervention: No  Fever: No  TPN and/or TF: No  If yes, type of TF/TPN and rate: NA    BP (!) 141/65 (BP Location: Right arm, Patient Position: Lying)   Pulse 78   Temp 97.6 °F (36.4 °C) (Oral)   Resp 19   Ht 5' 4" (1.626 m)   Wt 62.8 kg (138 lb 7.2 oz)   LMP  (LMP Unknown)   SpO2 96%   Breastfeeding No   BMI 23.76 kg/m²     Labs Reviewed and Include    Recent Labs   Lab 06/24/23  0447   *   CALCIUM 8.3*   ALBUMIN 1.7*   PROT 6.3      K 4.1   CO2 23      BUN 17   CREATININE 1.4   ALKPHOS 719*   ALT 55*   *   BILITOT 1.1*     Lab Results   Component Value Date    WBC 17.44 (H) 06/24/2023    HGB 7.9 (L) 06/24/2023    HCT 24.3 (L) 06/24/2023    MCV 82 06/24/2023     06/24/2023     No results for input(s): TSH, FREET4 in the last 168 hours.  Lab Results   Component Value Date    HGBA1C 8.1 (H) 05/25/2023       Nutritional status:   Body mass index is 23.76 kg/m².  Lab Results   Component Value Date    ALBUMIN 1.7 (L) 06/24/2023    ALBUMIN 1.8 (L) 06/23/2023    ALBUMIN 1.9 (L) 06/22/2023     No results found for: PREALBUMIN    Estimated Creatinine Clearance: 36 mL/min (based on SCr of 1.4 mg/dL).    Accu-Checks  Recent Labs     06/22/23  1134 06/22/23  1601 06/22/23  2025 06/23/23  0751 06/23/23  1036 06/23/23  1425 06/23/23  1819 06/23/23  2115 06/24/23  0807 06/24/23  1145   POCTGLUCOSE 140* 120* 238* 182* 165* 183* 259* 283* 223* 227*       Current Medications and/or Treatments Impacting Glycemic Control  Immunotherapy:  "   Immunosuppressants       None          Steroids:   Hormones (From admission, onward)      Start     Stop Route Frequency Ordered    06/09/23 2028  melatonin tablet 6 mg         -- Oral Nightly PRN 06/09/23 1931          Pressors:    Autonomic Drugs (From admission, onward)      None          Hyperglycemia/Diabetes Medications:   Antihyperglycemics (From admission, onward)      Start     Stop Route Frequency Ordered    06/24/23 2100  insulin detemir U-100 (Levemir) pen 15 Units         -- SubQ Nightly 06/24/23 0709    06/24/23 0715  insulin aspart U-100 pen 7 Units         -- SubQ 3 times daily with meals 06/24/23 0618    06/23/23 2145  insulin aspart U-100 pen 0-5 Units         -- SubQ Before meals & nightly PRN 06/23/23 2045    06/10/23 1315  insulin regular in 0.9 % NaCl 100 unit/100 mL (1 unit/mL) infusion        Question:  Enter initial dose (Units/hr):  Answer:  0.6    06/10 2200 IV Continuous 06/10/23 1211            ASSESSMENT and PLAN    Cardiac/Vascular  Hyperlipidemia associated with type 2 diabetes mellitus  - On Lipitor 40mg and Repatha outpatient  - Management per primary    Hypertension associated with diabetes  On multiple anti-HTNs outpatient  Management per primary      Renal/  ATN (acute tubular necrosis)  PAPA 2/2 ATN  Improving -- Cr now 1.4 improving -- this may account for increasing insulin requirements   Keep renal function in mind when adjusting insulin doses as renal clearance impacts insulin requirement       PAPA (acute kidney injury)  - Improved/resolved. Likely 2/2 IVVD 2/2 HHS on admission  - Management per primary    Endocrine  Type 2 diabetes mellitus with chronic kidney disease  - HHS on admission. Recent pancreatitis admission, not yet back to normal po intake with worsening hepatic function. Taking Glipizide and Farxiga at home. Never picked up Levemir Rx recently prescribed by her PCP    - 24 hour glucose trend: 160-280s (in setting of dexamethasone on 6/23)    -  Increase  Levemir to 16 units nightly   -  Increase Novolog to 7 units before meals + continue low dose correction Novolog prn     - When NPO she should not receive scheduled prandial insulin but she should still be given sliding scale insulin if indicated based on BG  - Accuchecks ac/hs while diet ordered; otherwise q4 hr while NPO    - Hypoglycemia protocol   - If blood glucose greater than 300, please ask patient not to eat food or drink anything other than water until correctional insulin has brought it back below 250    **Please notify endocrine of change in diet or plans for NPO for surgery (or any other reason) as this may change her insulin requirement**    GI  History of pancreatitis  - She has now had two episodes of pancreatitis on Trulicity, however dx of biliary pancreatitis per primary and GI based on eval this admission   - Episodes of pancreatitis likely multifactorial with GLP1 as contributing factor  - Laparoscopic cholecystectomy scheduled Friday 6/23   - Hold Trulicity at discharge        Tristan Norris, DO  Endocrinology  [facility] - Telemetry Stepdown

## 2023-06-24 NOTE — SUBJECTIVE & OBJECTIVE
"Interval HPI:   Overnight events: s/p lap adenike on 06/23 with steroid given in OR.  Some increase BG overnight in setting of steroids.  No new overnight issues.  BG in 200s this AM.      Eating:   <25%  Nausea: No  Hypoglycemia and intervention: No  Fever: No  TPN and/or TF: No  If yes, type of TF/TPN and rate: NA    BP (!) 141/65 (BP Location: Right arm, Patient Position: Lying)   Pulse 78   Temp 97.6 °F (36.4 °C) (Oral)   Resp 19   Ht 5' 4" (1.626 m)   Wt 62.8 kg (138 lb 7.2 oz)   LMP  (LMP Unknown)   SpO2 96%   Breastfeeding No   BMI 23.76 kg/m²     Labs Reviewed and Include    Recent Labs   Lab 06/24/23  0447   *   CALCIUM 8.3*   ALBUMIN 1.7*   PROT 6.3      K 4.1   CO2 23      BUN 17   CREATININE 1.4   ALKPHOS 719*   ALT 55*   *   BILITOT 1.1*     Lab Results   Component Value Date    WBC 17.44 (H) 06/24/2023    HGB 7.9 (L) 06/24/2023    HCT 24.3 (L) 06/24/2023    MCV 82 06/24/2023     06/24/2023     No results for input(s): TSH, FREET4 in the last 168 hours.  Lab Results   Component Value Date    HGBA1C 8.1 (H) 05/25/2023       Nutritional status:   Body mass index is 23.76 kg/m².  Lab Results   Component Value Date    ALBUMIN 1.7 (L) 06/24/2023    ALBUMIN 1.8 (L) 06/23/2023    ALBUMIN 1.9 (L) 06/22/2023     No results found for: PREALBUMIN    Estimated Creatinine Clearance: 36 mL/min (based on SCr of 1.4 mg/dL).    Accu-Checks  Recent Labs     06/22/23  1134 06/22/23  1601 06/22/23 2025 06/23/23  0751 06/23/23  1036 06/23/23  1425 06/23/23  1819 06/23/23  2115 06/24/23  0807 06/24/23  1145   POCTGLUCOSE 140* 120* 238* 182* 165* 183* 259* 283* 223* 227*       Current Medications and/or Treatments Impacting Glycemic Control  Immunotherapy:    Immunosuppressants       None          Steroids:   Hormones (From admission, onward)      Start     Stop Route Frequency Ordered    06/09/23 2028  melatonin tablet 6 mg         -- Oral Nightly PRN 06/09/23 1931          Pressors:  "   Autonomic Drugs (From admission, onward)      None          Hyperglycemia/Diabetes Medications:   Antihyperglycemics (From admission, onward)      Start     Stop Route Frequency Ordered    06/24/23 2100  insulin detemir U-100 (Levemir) pen 15 Units         -- SubQ Nightly 06/24/23 0709    06/24/23 0715  insulin aspart U-100 pen 7 Units         -- SubQ 3 times daily with meals 06/24/23 0618    06/23/23 2145  insulin aspart U-100 pen 0-5 Units         -- SubQ Before meals & nightly PRN 06/23/23 2045    06/10/23 1315  insulin regular in 0.9 % NaCl 100 unit/100 mL (1 unit/mL) infusion        Question:  Enter initial dose (Units/hr):  Answer:  0.6    06/10 2200 IV Continuous 06/10/23 1211

## 2023-06-24 NOTE — ASSESSMENT & PLAN NOTE
Restarted home amlodipine on 6/19  -holding ARB due to PAPA  -resumed nitrate with hydralazine  -continue to monitor and adjust regimen as necessary

## 2023-06-24 NOTE — ASSESSMENT & PLAN NOTE
Replete as indicated to goal phos of 3  Continue to monitor.  Patient does not like hospital food and was not eating well

## 2023-06-24 NOTE — SUBJECTIVE & OBJECTIVE
Interval History: NAEO. Some bleeding around drain site controlled with stitch. No further bleeding. Drain with appropriate output. AF. HDS.     Medications:  Continuous Infusions:   lactated ringers 50 mL/hr at 06/23/23 1614     Scheduled Meds:   amLODIPine  10 mg Oral Daily    aspirin  81 mg Oral Daily    famotidine  20 mg Oral Daily    hydrALAZINE  25 mg Oral Q12H    insulin aspart U-100  7 Units Subcutaneous TIDWM    insulin detemir U-100  15 Units Subcutaneous QHS    isosorbide dinitrate  10 mg Oral BID    piperacillin-tazobactam (Zosyn) IV (PEDS and ADULTS) (extended infusion is not appropriate)  4.5 g Intravenous Q8H     PRN Meds:acetaminophen, albuterol, dextrose 10%, dextrose 10%, dextrose, dextrose, glucagon (human recombinant), hydrALAZINE, HYDROmorphone, insulin aspart U-100, melatonin, ondansetron, oxyCODONE, prochlorperazine, sodium chloride 0.9%     Review of patient's allergies indicates:   Allergen Reactions    Milk containing products (dairy)      Causes GI distress     Objective:     Vital Signs (Most Recent):  Temp: 96 °F (35.6 °C) (06/24/23 0316)  Pulse: 82 (06/24/23 0316)  Resp: 15 (06/24/23 0504)  BP: 136/69 (06/24/23 0316)  SpO2: 95 % (06/24/23 0316) Vital Signs (24h Range):  Temp:  [96 °F (35.6 °C)-97.9 °F (36.6 °C)] 96 °F (35.6 °C)  Pulse:  [73-89] 82  Resp:  [10-24] 15  SpO2:  [95 %-100 %] 95 %  BP: (130-157)/(62-77) 136/69     Weight: 62.8 kg (138 lb 7.2 oz)  Body mass index is 23.76 kg/m².    Intake/Output - Last 3 Shifts         06/22 0700 06/23 0659 06/23 0700 06/24 0659 06/24 0700  06/25 0659    P.O. 120 360     IV Piggyback  1000     Total Intake(mL/kg) 120 (1.9) 1360 (21.7)     Urine (mL/kg/hr) 150 (0.1) 0 (0)     Drains  50     Stool       Total Output 150 50     Net -30 +1310            Urine Occurrence 1 x 1 x              Physical Exam  Vitals and nursing note reviewed.   Constitutional:       General: She is not in acute distress.     Appearance: Normal appearance.   HENT:       Head: Normocephalic and atraumatic.   Eyes:      Extraocular Movements: Extraocular movements intact.   Cardiovascular:      Rate and Rhythm: Normal rate.   Pulmonary:      Effort: Pulmonary effort is normal. No tachypnea or respiratory distress.   Abdominal:      General: There is no distension.      Palpations: Abdomen is soft.      Tenderness: There is no abdominal tenderness.      Comments: Soft, attp, ND  Incisions clear  Drain benign    Neurological:      General: No focal deficit present.      Mental Status: She is alert and oriented to person, place, and time.      Cranial Nerves: No cranial nerve deficit.      Motor: No weakness.   Psychiatric:         Attention and Perception: Attention normal.         Mood and Affect: Mood and affect normal.         Speech: Speech normal.         Behavior: Behavior is cooperative.        Significant Labs:  I have reviewed all pertinent lab results within the past 24 hours.    Significant Diagnostics:  I have reviewed all pertinent imaging results/findings within the past 24 hours.

## 2023-06-24 NOTE — PROGRESS NOTES
[facility] - Tele[facility] Stepdown  General Surgery  Progress Note    Subjective:     History of Present Illness:  No notes on file    Post-Op Info:  Procedure(s) (LRB):  CHOLECYSTECTOMY, LAPAROSCOPIC (N/A)   1 Day Post-Op     Interval History: NAEO. Some bleeding around drain site controlled with stitch. No further bleeding. Drain with appropriate output. AF. HDS.     Medications:  Continuous Infusions:   lactated ringers 50 mL/hr at 06/23/23 1614     Scheduled Meds:   amLODIPine  10 mg Oral Daily    aspirin  81 mg Oral Daily    famotidine  20 mg Oral Daily    hydrALAZINE  25 mg Oral Q12H    insulin aspart U-100  7 Units Subcutaneous TIDWM    insulin detemir U-100  15 Units Subcutaneous QHS    isosorbide dinitrate  10 mg Oral BID    piperacillin-tazobactam (Zosyn) IV (PEDS and ADULTS) (extended infusion is not appropriate)  4.5 g Intravenous Q8H     PRN Meds:acetaminophen, albuterol, dextrose 10%, dextrose 10%, dextrose, dextrose, glucagon (human recombinant), hydrALAZINE, HYDROmorphone, insulin aspart U-100, melatonin, ondansetron, oxyCODONE, prochlorperazine, sodium chloride 0.9%     Review of patient's allergies indicates:   Allergen Reactions    Milk containing products (dairy)      Causes GI distress     Objective:     Vital Signs (Most Recent):  Temp: 96 °F (35.6 °C) (06/24/23 0316)  Pulse: 82 (06/24/23 0316)  Resp: 15 (06/24/23 0504)  BP: 136/69 (06/24/23 0316)  SpO2: 95 % (06/24/23 0316) Vital Signs (24h Range):  Temp:  [96 °F (35.6 °C)-97.9 °F (36.6 °C)] 96 °F (35.6 °C)  Pulse:  [73-89] 82  Resp:  [10-24] 15  SpO2:  [95 %-100 %] 95 %  BP: (130-157)/(62-77) 136/69     Weight: 62.8 kg (138 lb 7.2 oz)  Body mass index is 23.76 kg/m².    Intake/Output - Last 3 Shifts         06/22 0700  06/23 0659 06/23 0700 06/24 0659 06/24 0700 06/25 0659    P.O. 120 360     IV Piggyback  1000     Total Intake(mL/kg) 120 (1.9) 1360 (21.7)     Urine (mL/kg/hr) 150 (0.1) 0 (0)     Drains  50     Stool       Total Output  150 50     Net -30 +1310            Urine Occurrence 1 x 1 x              Physical Exam  Vitals and nursing note reviewed.   Constitutional:       General: She is not in acute distress.     Appearance: Normal appearance.   HENT:      Head: Normocephalic and atraumatic.   Eyes:      Extraocular Movements: Extraocular movements intact.   Cardiovascular:      Rate and Rhythm: Normal rate.   Pulmonary:      Effort: Pulmonary effort is normal. No tachypnea or respiratory distress.   Abdominal:      General: There is no distension.      Palpations: Abdomen is soft.      Tenderness: There is no abdominal tenderness.      Comments: Soft, attp, ND  Incisions clear  Drain benign    Neurological:      General: No focal deficit present.      Mental Status: She is alert and oriented to person, place, and time.      Cranial Nerves: No cranial nerve deficit.      Motor: No weakness.   Psychiatric:         Attention and Perception: Attention normal.         Mood and Affect: Mood and affect normal.         Speech: Speech normal.         Behavior: Behavior is cooperative.        Significant Labs:  I have reviewed all pertinent lab results within the past 24 hours.    Significant Diagnostics:  I have reviewed all pertinent imaging results/findings within the past 24 hours.    Assessment/Plan:     Acute pancreatitis  Mariann Huff is a 63 y.o. female PMH CAD with GINGER , HTN, HLD, DM2, MURTAZA (not on CPAP), PAD, Afib and hx of DVT on Eliquis, biliary pancreatitis admitted to [facility] on 6/9/2023 for biliary pancreatitis. Now s/p ERCP on 6/15/23 with sphincterotomy/biliary tree swept and stent placed in CBD. General Surgery consulted for evaluation for cholecystectomy now s/p laparoscopic subtotal cholecystectomy with drain placement on 6/23/23.    - Keep clears for now  -Keep on Abx for at least 48 hours   -Record drain output   -Trend labs  - Okay for DVT ppx, please continue to  hold anticoagulation.  - Please call with  questions        Lasha Wahl MD  General Surgery  [facility] - Telemetry Stepdown

## 2023-06-24 NOTE — ASSESSMENT & PLAN NOTE
- HHS on admission. Recent pancreatitis admission, not yet back to normal po intake with worsening hepatic function. Taking Glipizide and Farxiga at home. Never picked up Levemir Rx recently prescribed by her PCP    - 24 hour glucose trend: 160-280s (in setting of dexamethasone on 6/23)    -  Increase Levemir to 16 units nightly   -  Increase Novolog to 7 units before meals + continue low dose correction Novolog prn     - When NPO she should not receive scheduled prandial insulin but she should still be given sliding scale insulin if indicated based on BG  - Accuchecks ac/hs while diet ordered; otherwise q4 hr while NPO    - Hypoglycemia protocol   - If blood glucose greater than 300, please ask patient not to eat food or drink anything other than water until correctional insulin has brought it back below 250    **Please notify endocrine of change in diet or plans for NPO for surgery (or any other reason) as this may change her insulin requirement**

## 2023-06-24 NOTE — PROGRESS NOTES
[facility] Tele[facility]  Telemedicine Progress Note    Patient Name: Mariann Huff  MRN: [id number]  Patient Class: IP- Inpatient   Admission Date: 6/9/2023  Length of Stay: 14 days  Attending Physician: Tessa Odonnell MD  Primary Care Provider: Jasbir Haney MD      Subjective:     Principal Problem:Hyperosmolar hyperglycemic state (HHS)    HPI:  63F with PMH of CAD with GINGER , HTN, HLD, DM2, MURTAZA (not on CPAP), PAD, Afib and hx of DVT on Eliquis and recent admission for pancreatitis wo presents to the ED with nausea and vomiting. Patient recently admitted 05/25 to 05/26 for first episode of acute pancreatitis. Lipase >2000 at that time. CT abdomen and US without evidence of biliary obstruction or pancreatic inflammation. Lipid panel wnl. Patient pain and n/v improved with treatment for acute pancreatitis felt secondary to home med Trulicity. She was discharged with new insulin regimen which she reports she never picked up from pharmacy so has been taking farxiga only at home. States that she has had progressively worsening n/v and po intolerance. States that she is not having severe pain similar to recent pancreatitis. Denies fevers, chills, chest pain, shortness of breath.     In the ED patient afebrile and hemodynamically stable saturating well on room air. Lipase >2000. BG >700 and serum osmo 326 after 1L IVFs. B-hydroxybutyrate 1.3. pH 7.4 and bicarb 34. AG 12. Patient started on aggressive IVFs and insulin gtt for management on pancreatitis and HHS. Admitted to the care of medicine for further evaluation and management.       Overview/Hospital Course:  Patient transitioned from insulin drip to subcutaneous insulin on 06/10.  On 06/11, patient developed new hyperbilirubinemia and worsening liver enzymes and given history of pancreatitis concern for possible choledocholithiasis.  MRCP ordered did not demonstrate any focal obstruction showever liver enzymes and alk phos increasing in  size. In speaking with AES, patient to be off apixaban and pletal prior to ERCP.      Interval History: Virtual follow up visit for suspected Hyperglycemia [R73.9]  Chest pain [R07.9]  Non compliance w medication regimen [Z91.148]  Acute pancreatitis, unspecified complication status, unspecified pancreatitis type [K85.90]  Hyperosmolar hyperglycemic state (HHS) [E11.00] present on admission.   This service was provided by telemedicine.    Patient was [facility] on: 06/20/2023   The [facility] location is: [facility]   Admitted 6/9/2023  2:20 PM    The patient is able to provide adequate history. Additional history was obtained from: chart review.  No significant events overnight reported.  Patient reports complaints of nothing new - waiting for surgery.  Symptoms are stable since yesterday.     I have reviewed the following on 6/23/2023:     Details     [x]   Lab results  sCr stable. Liver enzymes stable. BGs controlled. H&H stable    []   Micro reports     []   Pathology reports     []   Imaging reports     []   Cardiology Procedure reports     []   Outside records/CareEverywhere     []  Independently viewed:       Review of Systems   Constitutional:  Negative for fever.   Respiratory:  Negative for shortness of breath.      Objective:     Vital Signs (Most Recent):  Temp: 98 °F (36.7 °C) (06/23/23 0736)  Pulse: 75 (06/23/23 0736)  Resp: 18 (06/23/23 0736)  BP: (!) 166/72 (06/23/23 0736)  SpO2: 95 % (06/23/23 0845) Vital Signs (24h Range):  Temp:  [97.6 °F (36.4 °C)-99.1 °F (37.3 °C)] 98 °F (36.7 °C)  Pulse:  [75-84] 75  Resp:  [15-18] 18  SpO2:  [95 %-99 %] 95 %  BP: (102-166)/(55-74) 166/72     Weight: 62.8 kg (138 lb 7.2 oz)  Body mass index is 23.76 kg/m².    Intake/Output Summary (Last 24 hours) at 6/23/2023 1003  Last data filed at 6/23/2023 0607  Gross per 24 hour   Intake 120 ml   Output 150 ml   Net -30 ml           Physical Exam  Cardiovascular:      Comments: Monitor and/or  Vital signs reviewed at time of visit  Pulmonary:      Effort: Pulmonary effort is normal. No accessory muscle usage or respiratory distress.   Neurological:      Mental Status: She is alert. She is not disoriented.   Psychiatric:         Attention and Perception: Attention normal.         Behavior: Behavior is cooperative.           Significant Labs:   Recent Labs   Lab 03/29/23  0807 04/05/23  0928 05/25/23  1134   HGBA1C 8.8* 8.2* 8.1*       Recent Labs   Lab 06/22/23  1601 06/22/23  2025 06/23/23  0751   POCTGLUCOSE 120* 238* 182*       Recent Labs   Lab 06/21/23  0355 06/22/23  0505 06/23/23  0330   WBC 12.37 16.42* 16.01*   HGB 9.6* 9.1* 8.6*   HCT 29.7* 29.2* 27.6*    301 298       Recent Labs   Lab 06/21/23  0355 06/22/23  0505 06/23/23  0330   GRAN 75.9*  9.4* 79.8*  13.1* 75.8*  12.1*   LYMPH 8.2*  1.0 8.5*  1.4 12.2*  2.0   MONO 8.1  1.0 6.3  1.0 8.0  1.3*   EOS 0.5 0.4 0.3       Recent Labs   Lab 06/21/23  0355 06/22/23  0505 06/23/23  0330    145 142   K 3.0* 3.3* 4.2    108 105   CO2 23 25 26   BUN 29* 20 18   CREATININE 1.8* 1.5* 1.7*   * 83 172*   CALCIUM 9.3 8.9 9.0   ALBUMIN 1.9* 1.9* 1.8*   MG 1.7 1.5* 1.6   PHOS 1.9* 2.4* 2.2*       Recent Labs   Lab 06/21/23 0355 06/22/23  0505 06/23/23  0330   ALKPHOS 853* 887* 810*   ALT 65* 57* 50*   AST 83* 91* 75*   PROT 6.9 6.8 6.7   BILITOT 1.6* 1.6* 1.4*       Recent Labs   Lab 06/09/23 2004 06/13/23  0813 06/14/23  2238   LACTATE 1.6 1.3 1.6       SARS-CoV2 (COVID-19) Qualitative PCR (no units)   Date Value   09/25/2021 Not Detected   12/07/2020 Not Detected     Results for orders placed during the hospital encounter of 05/25/23    Echo    Interpretation Summary  · The estimated ejection fraction is 55%.  · The quantitatively derived ejection fraction is 52%.  · Normal right ventricular size with normal right ventricular systolic function.  · Normal left ventricular diastolic function.  · The left ventricle is normal  "in size with normal systolic function.  · Normal central venous pressure (3 mmHg).      US Kidney  Narrative: EXAMINATION:  US KIDNEY    CLINICAL HISTORY:  Silvino, hydronephrosis;    TECHNIQUE:  Ultrasound of the kidneys was performed including color flow and Doppler evaluation of the kidneys.    COMPARISON:  Ultrasound retroperitoneal 06/16/2023    FINDINGS:  Right kidney: The right kidney measures 12.2 cm. No cortical thinning. No loss of corticomedullary distinction. Resistive index measures 0.86.  Upper pole 0.8 x 11.0 x 0.7 cm simple cyst, previously 0.8 x 0.8 x 1.0 cm.  No mass. No renal stone. No hydronephrosis.    Left kidney: The left kidney measures 12.1 cm. No cortical thinning. No loss of corticomedullary distinction. Resistive index measures 0.86.  Upper pole 1.9 x 2.3 x 2.0 cm simple cyst, previously 2.2 x 1.9 x 12.4 cm.  Lower pole 2.0 x 2.2 x 3.2 cm minimally complex cyst with 2 mm septation, previously 2.4 x 3.1 x 2.8 cm.  No mass. No renal stone. Mild hydronephrosis, improved from prior.    Splenic resistive index is 0.83.  Impression: Mild left hydronephrosis, improved compared to prior exam.    Elevated arterial resistive indices which may represent medical renal disease.    Bilateral simple and left minimally complex renal cysts.    Electronically signed by resident: Christian Peres  Date:    06/19/2023  Time:    21:36    Electronically signed by: Tristan Santiago  Date:    06/20/2023  Time:    08:51      Labs and Imaging listed above were reviewed.         Assessment/Plan:      * Hyperosmolar hyperglycemic state (HHS)  See Diabetes    History of pancreatitis  See acute pancreatitis    Acute pancreatitis  Lipase >2000 on presentation, downtrending without any identifiable cause for elevation. Patient without any abdominal pain. Discontinued HCTZ as it could potentially be cause of pancreatitis as well  - US abdomen with gallbladder sludge and "prominence" of the pancreatic duct   - liver enzymes, " alk-phos, bilirubin elevated on 06/11, suggestive of possible choledocholithiasis as cause of pancreatitis.  MRCP ordered  - MRCP on my personal interpretation without any focal obstruction or retained stones, however liver enzymes continue to elevate  - AES was consulted for findings and after discussion, recommended ERCP/EUS once off apixaban and Pletal which was completed on 6/15 - biliary sludging found, CBD dilated.   - General surgery assessed for cholecystectomy - lap adenike 6/23/2023 with apixaban held (post-op incisional bleeding discussed with Gen Surg  - resume Zosyn due to elevated WBC and alk phos; bland diet    Type 2 diabetes mellitus with chronic kidney disease  IP HHS/DKA Pathway initiated. On presentation BG >700 and serum osmo 326 after 1L IVFs. B-hydroxybutyrate 1.3. pH 7.4 and bicarb 34. AG 12. Patient was started on insulin drip on admission but later in the day, patient transitioned to Levemir 14 units at night and aspart 3 units with meals.  Started on diabetic diet  - patient currently on   Antihyperglycemics (From admission, onward)    Start     Stop Route Frequency Ordered    06/23/23 0830  insulin aspart U-100 pen 0-5 Units         -- SubQ Every 4 hours PRN 06/23/23 0715    06/22/23 2100  insulin detemir U-100 (Levemir) pen 14 Units         -- SubQ Nightly 06/22/23 0946    06/22/23 1130  insulin aspart U-100 pen 6 Units         -- SubQ 3 times daily with meals 06/22/23 1010    06/10/23 1315  insulin regular in 0.9 % NaCl 100 unit/100 mL (1 unit/mL) infusion        Question:  Enter initial dose (Units/hr):  Answer:  0.6    06/10 2200 IV Continuous 06/10/23 1211        - further insulin titration per Endocrinology  - POC AC/HS    Hyperlipidemia associated with type 2 diabetes mellitus  Holding statin    Hypertension associated with diabetes  Restarting home amlodipine on 6/19  -holding ARB due to PAPA  -resumed nitrate with hydralazine  -continue to monitor and adjust regimen as  necessary    ATN (acute tubular necrosis)  Patient with acute kidney injury likely due to acute tubular necrosis PAPA is currently improving. Labs reviewed- Renal function/electrolytes with Estimated Creatinine Clearance: 29.6 mL/min (A) (based on SCr of 1.7 mg/dL (H)). according to latest data. Monitor urine output and serial BMP and adjust therapy as needed. Avoid nephrotoxins and renally dose meds for GFR listed above.     PAPA (acute kidney injury)  Creatinine 1.7 on presentation ; baseline 1.0  Estimated Creatinine Clearance: 29.6 mL/min (A) (based on SCr of 1.7 mg/dL (H)).  - IVFs stopped, recurrent PAPA on 6/12 with FeNA indication pre-renal disease. 1L LR bolus ordered over 3 hrs  - worsening renal function in setting of hypotension on 6/15 - ATN felt present by Nephology in conjunction with some urinary retention issues.   - repeating renal ultrasound 6/19 to assess if improved urination has improved size left hydronephrosis - improved; consulted Urology who recommend checking postvoid residual  - Nephrology team consulted and have signed off now that function is improving  -encourage adequate po hydration    Asthma  - albuterol prn  - stable on room air    History of DVT (deep vein thrombosis)  - UE/LE DVT   -hold apixaban in anticipation of lap adenike; bridge with Lovenox until surgery  -need to resume apixaban post-op    PAD (peripheral artery disease)  - hold Pletal, ASA, and statin  - started on apixaban for PAD outpatient as well as A-fib  - held apixaban for ERCP and again for lap adenike    Hydronephrosis  - repeat renal ultrasound ordered for 6/19 with improvement  -urology does not plan any intervention currently  -post void residual with 0 mL volume on bladder scan    Hypokalemia  -initially hyperkalemia at admit but developed hypokalemia as renal function improved  -very judicious repletion of potassium due to history of hyperkalemia/PAPA    Hypophosphatemia  Replete as indicated to goal phos of  3  Continue to monitor.  Patient does not like hospital food and is not eating well    Hyperkalemia  Due to PAPA, resolving    Hyperbilirubinemia  - AES completed ERCP with intervention   -improving    Moderate malnutrition  Nutrition consulted. Most recent weight and BMI monitored-     Measurements:  Wt Readings from Last 1 Encounters:   06/23/23 62.8 kg (138 lb 7.2 oz)   Body mass index is 23.76 kg/m².    Patient has been screened and assessed by RD.    Malnutrition Type:  Context: acute illness or injury  Level: moderate    Malnutrition Characteristic Summary:  Weight Loss (Malnutrition): 10% in 6 months  Energy Intake (Malnutrition): less than 75% for greater than 7 days    Interventions/Recommendations (treatment strategy):  1. When able, resume diet. Rec'd Diabetic/Lactose Restricted diet + Boost Glucose Control ONS. Encourage PO intake as tolerated. 2. RD to monitor & follow-up.    Discharge planning  Diet: Diet clear liquid [facility]; Consistent Carbohydrate  Significant LDAs:   IV Access Type: Peripheral  Urinary Catheter Indication if present: Patient Does Not Have Urinary Catheter  Other Lines/Tubes/Drains:     Goals of Care:    Previous admission:  5/25/23  Code Status: Full Code  Likely prognosis:  Good  Advance Care Planning   Date: 06/21/2023  Patient wishes to continue curative therapies      REBECCA: 6/27/2023     Code Status: Full Code   Is the patient medically ready for discharge?: No    Reason for patient still in hospital (select all that apply): Patient trending condition, Laboratory test, Treatment, Consult recommendations and Other (specify) Lap Aimee on 6/23/23  Discharge Plan A: Home Health   Discharge Delays: None known at this time    The amount of time spent during, and associated with, this encounter was 50 minutes; the nature of the activities performed were:  Preparing to see the patient, chart review  Obtaining and/or receiving separately obtained history   Performing medically  appropriate examination and evaluation  Counseling and educating the patient/family/caregiver  Ordering medications, tests, or procedures  Referring to and communicating with other health care professionals  Documenting clinical information in the EHR  Independently interpreting results  Care coordination    Acute encephalopathy  Perhaps related to sepsis.  Patient on 6/14 a.m., increasingly lethargic.  Per , last night she had jerking movements when attempting to go to the restroom with assistance.  On exam this morning she had asterixis and required sternal rubbing to awaken her.  - Ammonia was ordered and was normal for the past 2 days, patient has not had a bowel movement in over a week per the patient's .  We will give lactulose enema regardless of ammonia level  - VBG ordered, personal interpretation reflects no evidence of hypercapnic respiratory failure  - blood cultures were collected yesterday x2, no growth today  - escalated patient's antibiotics from ceftriaxone and metronidazole to Zosyn given her increasing white blood cell count        Active Hospital Problems    Diagnosis  POA    *Hyperosmolar hyperglycemic state (HHS) [E11.00]  Yes     Priority: 2     History of pancreatitis [Z87.19]  Not Applicable     Priority: 1 - High    Acute pancreatitis [K85.90]  Yes     Priority: 1 - High    Type 2 diabetes mellitus with chronic kidney disease [E11.22]  Yes     Priority: 2     Hypertension associated with diabetes [E11.59, I15.2]  Yes     Priority: 3      Chronic    Hyperlipidemia associated with type 2 diabetes mellitus [E11.69, E78.5]  Yes     Priority: 3      Chronic    ATN (acute tubular necrosis) [N17.0]  Yes     Priority: 4     PAPA (acute kidney injury) [N17.9]  Yes     Priority: 4     Asthma [J45.909]  Yes     Priority: 7      Chronic    History of DVT (deep vein thrombosis) [Z86.718]  Not Applicable     Priority: 10     PAD (peripheral artery disease) [I73.9]  Yes      Priority: 11      Chronic    Hydronephrosis [N13.30]  Yes     Priority: 17     Hypokalemia [E87.6]  Yes     Priority: 23     Hypophosphatemia [E83.39]  Yes     Priority: 24     Hyperkalemia [E87.5]  Yes     Priority: 24     Hyperbilirubinemia [E80.6]  Yes     Priority: 24     Moderate malnutrition [E44.0]  Yes     Priority: 24     Discharge planning [Z02.9]  Not Applicable     Priority: 25 - Low      Resolved Hospital Problems    Diagnosis Date Resolved POA    Sepsis with acute liver failure without hepatic coma or septic shock [A41.9, R65.20, K72.00] 06/19/2023 No       Inpatient Medications Prescribed for Management of Current Problems:     Scheduled Meds:    amLODIPine  10 mg Oral Daily    aspirin  81 mg Oral Daily    famotidine  20 mg Oral Daily    hydrALAZINE  25 mg Oral Q12H    insulin aspart U-100  6 Units Subcutaneous TIDWM    insulin detemir U-100  14 Units Subcutaneous QHS    isosorbide dinitrate  10 mg Oral BID    piperacillin-tazobactam (Zosyn) IV (PEDS and ADULTS) (extended infusion is not appropriate)  4.5 g Intravenous Q8H     Continuous Infusions:     mL/hr x 5 hours     As Needed: acetaminophen, albuterol, dextrose 10%, dextrose 10%, dextrose, dextrose, glucagon (human recombinant), hydrALAZINE, HYDROmorphone, insulin aspart U-100, melatonin, ondansetron, oxyCODONE, prochlorperazine, sodium chloride 0.9%    VTE Risk Mitigation (From admission, onward)    None        I have completed this tele-visit with the assistance of a telepresenter.    The attending portion of this evaluation, treatment, and documentation was performed per Tessa Odonnell MD via Telemedicine AudioVisual using the secure [employer] software platform with 2 way audio/video. The provider was located off-site and the patient is located in the hospital. The aforementioned video software was utilized to document the relevant history and physical exam    Tessa Odonnell MD  Department of Hospital Medicine   [facility] - Tele[facility] Stepdown

## 2023-06-24 NOTE — PLAN OF CARE
Problem: Adult Inpatient Plan of Care  Goal: Plan of Care Review  Outcome: Ongoing, Progressing     Problem: Adult Inpatient Plan of Care  Goal: Optimal Comfort and Wellbeing  Outcome: Ongoing, Progressing     Pt AAOx4. VSS. RA. RLQ pain at incision site treated with PRN pain meds. MADHURI drain leaking at site at beginning of shift. Redressed and bleeding stopped later during the night. MADHURI drain 40cc of bloody output. Other lap site incisions CDI. BG covered with SSI. Abx given. Safety maintained. Call bell within reach.

## 2023-06-24 NOTE — ASSESSMENT & PLAN NOTE
Patient with acute kidney injury likely due to acute tubular necrosis PAPA is currently improving. Labs reviewed- Renal function/electrolytes with Estimated Creatinine Clearance: 29.6 mL/min (A) (based on SCr of 1.7 mg/dL (H)). according to latest data. Monitor urine output and serial BMP and adjust therapy as needed. Avoid nephrotoxins and renally dose meds for GFR listed above.

## 2023-06-24 NOTE — ASSESSMENT & PLAN NOTE
IP HHS/DKA Pathway initiated. On presentation BG >700 and serum osmo 326 after 1L IVFs. B-hydroxybutyrate 1.3. pH 7.4 and bicarb 34. AG 12. Patient was started on insulin drip on admission but later in the day, patient transitioned to Levemir 14 units at night and aspart 3 units with meals.  Started on diabetic diet  - patient currently on   Antihyperglycemics (From admission, onward)    Start     Stop Route Frequency Ordered    06/23/23 0830  insulin aspart U-100 pen 0-5 Units         -- SubQ Every 4 hours PRN 06/23/23 0715    06/22/23 2100  insulin detemir U-100 (Levemir) pen 14 Units         -- SubQ Nightly 06/22/23 0946    06/22/23 1130  insulin aspart U-100 pen 6 Units         -- SubQ 3 times daily with meals 06/22/23 1010    06/10/23 1315  insulin regular in 0.9 % NaCl 100 unit/100 mL (1 unit/mL) infusion        Question:  Enter initial dose (Units/hr):  Answer:  0.6    06/10 2200 IV Continuous 06/10/23 1211        - further insulin titration per Endocrinology  - POC AC/HS

## 2023-06-24 NOTE — ASSESSMENT & PLAN NOTE
- UE/LE DVT   -hold apixaban in anticipation of lap adenike; bridge with Lovenox until surgery  -need to resume apixaban post-op  - resuming DVT prophylaxis 6/24

## 2023-06-24 NOTE — PROGRESS NOTES
[facility] - Telemetry [facility] Medicine  Telemedicine Progress Note    Patient Name: Mariann Huff  MRN: [id number]  Patient Class: IP- Inpatient   Admission Date: 6/9/2023  Length of Stay: 15 days  Attending Physician: Tessa Odonnell MD  Primary Care Provider: Jasbir Haney MD    Subjective:     Principal Problem:Hyperosmolar hyperglycemic state (HHS)    HPI:  63F with PMH of CAD with GINGER , HTN, HLD, DM2, MURTAZA (not on CPAP), PAD, Afib and hx of DVT on Eliquis and recent admission for pancreatitis wo presents to the ED with nausea and vomiting. Patient recently admitted 05/25 to 05/26 for first episode of acute pancreatitis. Lipase >2000 at that time. CT abdomen and US without evidence of biliary obstruction or pancreatic inflammation. Lipid panel wnl. Patient pain and n/v improved with treatment for acute pancreatitis felt secondary to home med Trulicity. She was discharged with new insulin regimen which she reports she never picked up from pharmacy so has been taking farxiga only at home. States that she has had progressively worsening n/v and po intolerance. States that she is not having severe pain similar to recent pancreatitis. Denies fevers, chills, chest pain, shortness of breath.     In the ED patient afebrile and hemodynamically stable saturating well on room air. Lipase >2000. BG >700 and serum osmo 326 after 1L IVFs. B-hydroxybutyrate 1.3. pH 7.4 and bicarb 34. AG 12. Patient started on aggressive IVFs and insulin gtt for management on pancreatitis and HHS. Admitted to the care of medicine for further evaluation and management.       Overview/Hospital Course:  Patient transitioned from insulin drip to subcutaneous insulin on 06/10.  On 06/11, patient developed new hyperbilirubinemia and worsening liver enzymes and given history of pancreatitis concern for possible choledocholithiasis.  MRCP ordered did not demonstrate any focal obstruction showever liver enzymes and alk phos increasing in size.  In speaking with AES, patient to be off apixaban and pletal prior to ERCP.      Interval History: Virtual follow up visit for suspected Hyperglycemia [R73.9]  Chest pain [R07.9]  Non compliance w medication regimen [Z91.148]  Acute pancreatitis, unspecified complication status, unspecified pancreatitis type [K85.90]  Hyperosmolar hyperglycemic state (HHS) [E11.00] present on admission.   This service was provided by telemedicine.    Patient was transferred to [facility] on: 06/20/2023   The [facility] location is: [facility]   Admitted 6/9/2023  2:20 PM    The patient is able to provide adequate history. Additional history was obtained from: chart review.  No significant events overnight reported.  Patient reports complaints of post-op pain. Incisional bleeding resolved after stitch placed by Gen Surg last night.  Symptoms are decreasing in both severity and frequency since yesterday.     I have reviewed the following on 6/24/2023:     Details     [x]   Lab results  sCr improved. Liver enzymes stable/improving. BGs > 200. H&H down-trending. WBC elevated.    []   Micro reports     []   Pathology reports     []   Imaging reports     []   Cardiology Procedure reports     []   Outside records/CareEverywhere     []  Independently viewed:       Review of Systems   Constitutional:  Negative for fever.   Respiratory:  Negative for shortness of breath.      Objective:     Vital Signs (Most Recent):  Temp: 97.8 °F (36.6 °C) (06/24/23 0804)  Pulse: 79 (06/24/23 0804)  Resp: 18 (06/24/23 0804)  BP: (!) 169/77 (06/24/23 0804)  SpO2: 95 % (06/24/23 1022) Vital Signs (24h Range):  Temp:  [96 °F (35.6 °C)-97.8 °F (36.6 °C)] 97.8 °F (36.6 °C)  Pulse:  [73-89] 79  Resp:  [10-24] 18  SpO2:  [94 %-100 %] 95 %  BP: (130-169)/(62-77) 169/77     Weight: 62.8 kg (138 lb 7.2 oz)  Body mass index is 23.76 kg/m².    Intake/Output Summary (Last 24 hours) at 6/24/2023 1029  Last data filed at 6/24/2023 1022  Gross per 24 hour    Intake 1840 ml   Output 50 ml   Net 1790 ml           Physical Exam  Cardiovascular:      Comments: Monitor and/or Vital signs reviewed at time of visit  Pulmonary:      Effort: Pulmonary effort is normal. No accessory muscle usage or respiratory distress.   Neurological:      Mental Status: She is alert. She is not disoriented.   Psychiatric:         Attention and Perception: Attention normal.         Behavior: Behavior is cooperative.           Significant Labs:   Recent Labs   Lab 03/29/23  0807 04/05/23  0928 05/25/23  1134   HGBA1C 8.8* 8.2* 8.1*       Recent Labs   Lab 06/23/23  1819 06/23/23  2115 06/24/23  0807   POCTGLUCOSE 259* 283* 223*       Recent Labs   Lab 06/22/23  0505 06/23/23  0330 06/24/23  0447   WBC 16.42* 16.01* 17.44*   HGB 9.1* 8.6* 7.9*   HCT 29.2* 27.6* 24.3*    298 310       Recent Labs   Lab 06/22/23  0505 06/23/23  0330 06/24/23  0447   GRAN 79.8*  13.1* 75.8*  12.1* 83.8*  14.6*   LYMPH 8.5*  1.4 12.2*  2.0 8.3*  1.4   MONO 6.3  1.0 8.0  1.3* 6.3  1.1*   EOS 0.4 0.3 0.0       Recent Labs   Lab 06/22/23  0505 06/23/23  0330 06/24/23  0447    142 143   K 3.3* 4.2 4.1    105 106   CO2 25 26 23   BUN 20 18 17   CREATININE 1.5* 1.7* 1.4   GLU 83 172* 208*   CALCIUM 8.9 9.0 8.3*   ALBUMIN 1.9* 1.8* 1.7*   MG 1.5* 1.6 1.6   PHOS 2.4* 2.2* 2.9       Recent Labs   Lab 06/22/23  0505 06/23/23  0330 06/24/23  0447   ALKPHOS 887* 810* 719*   ALT 57* 50* 55*   AST 91* 75* 173*   PROT 6.8 6.7 6.3   BILITOT 1.6* 1.4* 1.1*       Recent Labs   Lab 06/09/23 2004 06/13/23  0813 06/14/23  2238   LACTATE 1.6 1.3 1.6       SARS-CoV2 (COVID-19) Qualitative PCR (no units)   Date Value   09/25/2021 Not Detected   12/07/2020 Not Detected     Results for orders placed during the hospital encounter of 05/25/23    Echo    Interpretation Summary  · The estimated ejection fraction is 55%.  · The quantitatively derived ejection fraction is 52%.  · Normal right ventricular size with  normal right ventricular systolic function.  · Normal left ventricular diastolic function.  · The left ventricle is normal in size with normal systolic function.  · Normal central venous pressure (3 mmHg).      US Kidney  Narrative: EXAMINATION:  US KIDNEY    CLINICAL HISTORY:  Silvino, hydronephrosis;    TECHNIQUE:  Ultrasound of the kidneys was performed including color flow and Doppler evaluation of the kidneys.    COMPARISON:  Ultrasound retroperitoneal 06/16/2023    FINDINGS:  Right kidney: The right kidney measures 12.2 cm. No cortical thinning. No loss of corticomedullary distinction. Resistive index measures 0.86.  Upper pole 0.8 x 11.0 x 0.7 cm simple cyst, previously 0.8 x 0.8 x 1.0 cm.  No mass. No renal stone. No hydronephrosis.    Left kidney: The left kidney measures 12.1 cm. No cortical thinning. No loss of corticomedullary distinction. Resistive index measures 0.86.  Upper pole 1.9 x 2.3 x 2.0 cm simple cyst, previously 2.2 x 1.9 x 12.4 cm.  Lower pole 2.0 x 2.2 x 3.2 cm minimally complex cyst with 2 mm septation, previously 2.4 x 3.1 x 2.8 cm.  No mass. No renal stone. Mild hydronephrosis, improved from prior.    Splenic resistive index is 0.83.  Impression: Mild left hydronephrosis, improved compared to prior exam.    Elevated arterial resistive indices which may represent medical renal disease.    Bilateral simple and left minimally complex renal cysts.    Electronically signed by resident: Christian Peres  Date:    06/19/2023  Time:    21:36    Electronically signed by: Tristan Santiago  Date:    06/20/2023  Time:    08:51      Labs and Imaging listed above were reviewed.       Assessment/Plan:      * Hyperosmolar hyperglycemic state (HHS)  See Diabetes    History of pancreatitis  See acute pancreatitis    Acute pancreatitis  Lipase >2000 on presentation, downtrending without any identifiable cause for elevation. Patient without any abdominal pain. Discontinued HCTZ as it could potentially be cause of  "pancreatitis as well  - US abdomen with gallbladder sludge and "prominence" of the pancreatic duct   - liver enzymes, alk-phos, bilirubin elevated on 06/11, suggestive of possible choledocholithiasis as cause of pancreatitis.  MRCP ordered  - MRCP on my personal interpretation without any focal obstruction or retained stones, however liver enzymes continue to elevate  - AES was consulted for findings and after discussion, recommended ERCP/EUS once off apixaban and Pletal which was completed on 6/15 - biliary sludging found, CBD dilated.   - resumed Zosyn due to elevated WBC and alk phos; bland diet  - General surgery assessed for cholecystectomy - lap adenike 6/23/2023 with apixaban held post-op - incisional bleeding discussed with Gen Surg  - patient s/p lap adenike on 6/23: per Gen Surg: clears for now, antibiotics for at least 48 hours, monitor drain output, Okay for DVT ppx, continue to  hold anticoagulation.    Type 2 diabetes mellitus with chronic kidney disease  IP HHS/DKA Pathway initiated. On presentation BG >700 and serum osmo 326 after 1L IVFs. B-hydroxybutyrate 1.3. pH 7.4 and bicarb 34. AG 12. Patient was started on insulin drip on admission but later in the day, patient transitioned to Levemir 14 units at night and aspart 3 units with meals.  Started on diabetic diet  - patient currently on per Endocrinology:  Antihyperglycemics (From admission, onward)    Start     Stop Route Frequency Ordered    06/24/23 2100  insulin detemir U-100 (Levemir) pen 16 Units         -- SubQ Nightly 06/24/23 1248    06/24/23 0715  insulin aspart U-100 pen 7 Units         -- SubQ 3 times daily with meals 06/24/23 0618    06/23/23 2145  insulin aspart U-100 pen 0-5 Units         -- SubQ Before meals & nightly PRN 06/23/23 2045    06/10/23 1315  insulin regular in 0.9 % NaCl 100 unit/100 mL (1 unit/mL) infusion        Question:  Enter initial dose (Units/hr):  Answer:  0.6    06/10 2200 IV Continuous 06/10/23 1211        - " further insulin titration per Endocrinology  - POC AC/HS    Hyperlipidemia associated with type 2 diabetes mellitus  Holding statin    Hypertension associated with diabetes  Restarted home amlodipine on 6/19  -holding ARB due to PAPA  -resumed nitrate with hydralazine  -continue to monitor and adjust regimen as necessary    ATN (acute tubular necrosis)  Patient with acute kidney injury likely due to acute tubular necrosis PAPA is currently improving. Labs reviewed- Renal function/electrolytes with Estimated Creatinine Clearance: 36 mL/min (based on SCr of 1.4 mg/dL). according to latest data. Monitor urine output and serial BMP and adjust therapy as needed. Avoid nephrotoxins and renally dose meds for GFR listed above.     PAPA (acute kidney injury)  Creatinine 1.7 on presentation ; baseline 1.0  Estimated Creatinine Clearance: 36 mL/min (based on SCr of 1.4 mg/dL).  - IVFs stopped, recurrent PAPA on 6/12 with FeNA indication pre-renal disease. 1L LR bolus ordered over 3 hrs  - worsening renal function in setting of hypotension on 6/15 - ATN felt present by Nephology in conjunction with some urinary retention issues.   - repeating renal ultrasound 6/19 to assess if improved urination has improved size left hydronephrosis - improved; consulted Urology who recommend checking postvoid residual  - Nephrology team consulted and have signed off now that function is improving  -encourage adequate po hydration    Asthma  - albuterol prn  - stable on room air    History of DVT (deep vein thrombosis)  - UE/LE DVT   -hold apixaban in anticipation of lap adenike; bridge with Lovenox until surgery  -need to resume apixaban post-op  - resuming DVT prophylaxis 6/24    PAD (peripheral artery disease)  - hold Pletal, ASA, and statin  - started on apixaban for PAD outpatient as well as A-fib  - held apixaban for ERCP and again for lap adenike    Hydronephrosis  - repeat renal ultrasound ordered for 6/19 with improvement  -urology does not  plan any intervention currently  -post void residual with 0 mL volume on bladder scan    Hypokalemia  -initially hyperkalemia at admit but developed hypokalemia as renal function improved  -very judicious repletion of potassium due to history of hyperkalemia/PAPA    Hypophosphatemia  Replete as indicated to goal phos of 3  Continue to monitor.  Patient does not like hospital food and was not eating well    Hyperkalemia  Due to PAPA, resolving    Hyperbilirubinemia  - AES completed ERCP with intervention   -improving    Moderate malnutrition  Nutrition consulted. Most recent weight and BMI monitored-     Measurements:  Wt Readings from Last 1 Encounters:   06/23/23 62.8 kg (138 lb 7.2 oz)   Body mass index is 23.76 kg/m².    Patient has been screened and assessed by RD.    Malnutrition Type:  Context: acute illness or injury  Level: moderate    Malnutrition Characteristic Summary:  Weight Loss (Malnutrition): 10% in 6 months  Energy Intake (Malnutrition): less than 75% for greater than 7 days    Interventions/Recommendations (treatment strategy):  1. When able, resume diet. Rec'd Diabetic/Lactose Restricted diet + Boost Glucose Control ONS. Encourage PO intake as tolerated. 2. RD to monitor & follow-up.    Discharge planning  Diet: Diet clear liquid [facility]; Consistent Carbohydrate  Significant LDAs:   IV Access Type: Peripheral  Urinary Catheter Indication if present: Patient Does Not Have Urinary Catheter  Other Lines/Tubes/Drains:     Goals of Care:    Previous admission:  5/25/23  Code Status: Full Code  Likely prognosis:  Good  Advance Care Planning   Date: 06/21/2023  Patient wishes to continue curative therapies      REBECCA: 6/27/2023     Code Status: Full Code   Is the patient medically ready for discharge?: No    Reason for patient still in hospital (select all that apply): Patient trending condition, Laboratory test, Treatment, Consult recommendations and Other (specify) Lap Aimee on 6/23/23  Discharge  Plan A: Home Health   Discharge Delays: None known at this time    The amount of time spent during, and associated with, this encounter was 50 minutes; the nature of the activities performed were:  Preparing to see the patient, chart review  Obtaining and/or receiving separately obtained history   Performing medically appropriate examination and evaluation  Counseling and educating the patient/family/caregiver  Ordering medications, tests, or procedures  Referring to and communicating with other health care professionals  Documenting clinical information in the EHR  Independently interpreting results  Care coordination    Acute encephalopathy  Perhaps related to sepsis.  Patient on 6/14 a.m., increasingly lethargic.  Per , last night she had jerking movements when attempting to go to the restroom with assistance.  On exam this morning she had asterixis and required sternal rubbing to awaken her.  - Ammonia was ordered and was normal for the past 2 days, patient has not had a bowel movement in over a week per the patient's .  We will give lactulose enema regardless of ammonia level  - VBG ordered, personal interpretation reflects no evidence of hypercapnic respiratory failure  - blood cultures were collected yesterday x2, no growth today  - escalated patient's antibiotics from ceftriaxone and metronidazole to Zosyn given her increasing white blood cell count        VTE Risk Mitigation (From admission, onward)         Ordered     enoxaparin injection 30 mg  Every 24 hours         06/24/23 5969              I have completed this tele-visit with the assistance of a telepresenter.    The attending portion of this evaluation, treatment, and documentation was performed per Tessa Odonnell MD via Telemedicine AudioVisual using the secure [employer] software platform with 2 way audio/video. The provider was located off-site and the patient is located in the hospital. The aforementioned video software was utilized  to document the relevant history and physical exam    Tessa Odonnell MD  Department of Hospital Medicine   [facility] - Telemetry Stepdown

## 2023-06-24 NOTE — ASSESSMENT & PLAN NOTE
- UE/LE DVT   -hold apixaban in anticipation of lap adenike; bridge with Lovenox until surgery  -need to resume apixaban post-op

## 2023-06-24 NOTE — ASSESSMENT & PLAN NOTE
Patient with acute kidney injury likely due to acute tubular necrosis PAPA is currently improving. Labs reviewed- Renal function/electrolytes with Estimated Creatinine Clearance: 36 mL/min (based on SCr of 1.4 mg/dL). according to latest data. Monitor urine output and serial BMP and adjust therapy as needed. Avoid nephrotoxins and renally dose meds for GFR listed above.

## 2023-06-24 NOTE — PLAN OF CARE
Problem: Adult Inpatient Plan of Care  Goal: Plan of Care Review  Outcome: Ongoing, Progressing  Goal: Patient-Specific Goal (Individualized)  Outcome: Ongoing, Progressing  Goal: Absence of Hospital-Acquired Illness or Injury  Outcome: Ongoing, Progressing  Goal: Optimal Comfort and Wellbeing  Outcome: Ongoing, Progressing  Goal: Readiness for Transition of Care  Outcome: Ongoing, Progressing     Problem: Diabetic Ketoacidosis  Goal: Fluid and Electrolyte Balance with Absence of Ketosis  Outcome: Ongoing, Progressing     Problem: Diabetes Comorbidity  Goal: Blood Glucose Level Within Targeted Range  Outcome: Ongoing, Progressing     Problem: Fluid and Electrolyte Imbalance (Acute Kidney Injury/Impairment)  Goal: Fluid and Electrolyte Balance  Outcome: Ongoing, Progressing     Problem: Oral Intake Inadequate (Acute Kidney Injury/Impairment)  Goal: Optimal Nutrition Intake  Outcome: Ongoing, Progressing     Problem: Renal Function Impairment (Acute Kidney Injury/Impairment)  Goal: Effective Renal Function  Outcome: Ongoing, Progressing     Problem: Adjustment to Illness (Sepsis/Septic Shock)  Goal: Optimal Coping  Outcome: Ongoing, Progressing     Problem: Bleeding (Sepsis/Septic Shock)  Goal: Absence of Bleeding  Outcome: Ongoing, Progressing     Problem: Glycemic Control Impaired (Sepsis/Septic Shock)  Goal: Blood Glucose Level Within Desired Range  Outcome: Ongoing, Progressing     Problem: Infection Progression (Sepsis/Septic Shock)  Goal: Absence of Infection Signs and Symptoms  Outcome: Ongoing, Progressing     Problem: Nutrition Impaired (Sepsis/Septic Shock)  Goal: Optimal Nutrition Intake  Outcome: Ongoing, Progressing     Problem: Skin Injury Risk Increased  Goal: Skin Health and Integrity  Outcome: Ongoing, Progressing     Problem: Fall Injury Risk  Goal: Absence of Fall and Fall-Related Injury  Outcome: Ongoing, Progressing     Problem: Impaired Wound Healing  Goal: Optimal Wound Healing  Outcome:  Ongoing, Progressing  Poc reviewed. Pt laying in bed in supine position hob elevated resp even and unlabored no distress noted at this time denies pain at this time bleeding at incision site control dressing clean dry intact with small amount of serousangious drainage on dressing geovani bulb in place draining red fluid about 10cc pt ambulated to restroom with standby assist safety precautions maintained.

## 2023-06-24 NOTE — ASSESSMENT & PLAN NOTE
Nutrition consulted. Most recent weight and BMI monitored-     Measurements:  Wt Readings from Last 1 Encounters:   06/23/23 62.8 kg (138 lb 7.2 oz)   Body mass index is 23.76 kg/m².    Patient has been screened and assessed by RD.    Malnutrition Type:  Context: acute illness or injury  Level: moderate    Malnutrition Characteristic Summary:  Weight Loss (Malnutrition): 10% in 6 months  Energy Intake (Malnutrition): less than 75% for greater than 7 days    Interventions/Recommendations (treatment strategy):  1. When able, resume diet. Rec'd Diabetic/Lactose Restricted diet + Boost Glucose Control ONS. Encourage PO intake as tolerated. 2. RD to monitor & follow-up.

## 2023-06-24 NOTE — ASSESSMENT & PLAN NOTE
Creatinine 1.7 on presentation ; baseline 1.0  Estimated Creatinine Clearance: 29.6 mL/min (A) (based on SCr of 1.7 mg/dL (H)).  - IVFs stopped, recurrent PAPA on 6/12 with FeNA indication pre-renal disease. 1L LR bolus ordered over 3 hrs  - worsening renal function in setting of hypotension on 6/15 - ATN felt present by Nephology in conjunction with some urinary retention issues.   - repeating renal ultrasound 6/19 to assess if improved urination has improved size left hydronephrosis - improved; consulted Urology who recommend checking postvoid residual  - Nephrology team consulted and have signed off now that function is improving  -encourage adequate po hydration

## 2023-06-25 VITALS
WEIGHT: 138.44 LBS | OXYGEN SATURATION: 92 % | RESPIRATION RATE: 16 BRPM | HEIGHT: 64 IN | SYSTOLIC BLOOD PRESSURE: 158 MMHG | DIASTOLIC BLOOD PRESSURE: 75 MMHG | HEART RATE: 82 BPM | TEMPERATURE: 98 F | BODY MASS INDEX: 23.64 KG/M2

## 2023-06-25 LAB
ALBUMIN SERPL BCP-MCNC: 1.8 G/DL (ref 3.5–5.2)
ALP SERPL-CCNC: 677 U/L (ref 55–135)
ALT SERPL W/O P-5'-P-CCNC: 45 U/L (ref 10–44)
ANION GAP SERPL CALC-SCNC: 12 MMOL/L (ref 8–16)
AST SERPL-CCNC: 102 U/L (ref 10–40)
BASOPHILS # BLD AUTO: 0.03 K/UL (ref 0–0.2)
BASOPHILS NFR BLD: 0.2 % (ref 0–1.9)
BILIRUB SERPL-MCNC: 1.2 MG/DL (ref 0.1–1)
BUN SERPL-MCNC: 13 MG/DL (ref 8–23)
CALCIUM SERPL-MCNC: 8.3 MG/DL (ref 8.7–10.5)
CHLORIDE SERPL-SCNC: 104 MMOL/L (ref 95–110)
CO2 SERPL-SCNC: 26 MMOL/L (ref 23–29)
CREAT SERPL-MCNC: 1.2 MG/DL (ref 0.5–1.4)
DIFFERENTIAL METHOD: ABNORMAL
EOSINOPHIL # BLD AUTO: 0.1 K/UL (ref 0–0.5)
EOSINOPHIL NFR BLD: 1 % (ref 0–8)
ERYTHROCYTE [DISTWIDTH] IN BLOOD BY AUTOMATED COUNT: 16 % (ref 11.5–14.5)
EST. GFR  (NO RACE VARIABLE): 51.2 ML/MIN/1.73 M^2
GLUCOSE SERPL-MCNC: 117 MG/DL (ref 70–110)
HCT VFR BLD AUTO: 26.4 % (ref 37–48.5)
HGB BLD-MCNC: 8.1 G/DL (ref 12–16)
IMM GRANULOCYTES # BLD AUTO: 0.13 K/UL (ref 0–0.04)
IMM GRANULOCYTES NFR BLD AUTO: 1 % (ref 0–0.5)
LYMPHOCYTES # BLD AUTO: 1.8 K/UL (ref 1–4.8)
LYMPHOCYTES NFR BLD: 14.1 % (ref 18–48)
MAGNESIUM SERPL-MCNC: 1.6 MG/DL (ref 1.6–2.6)
MCH RBC QN AUTO: 26.5 PG (ref 27–31)
MCHC RBC AUTO-ENTMCNC: 30.7 G/DL (ref 32–36)
MCV RBC AUTO: 86 FL (ref 82–98)
MONOCYTES # BLD AUTO: 1 K/UL (ref 0.3–1)
MONOCYTES NFR BLD: 8 % (ref 4–15)
NEUTROPHILS # BLD AUTO: 9.5 K/UL (ref 1.8–7.7)
NEUTROPHILS NFR BLD: 75.7 % (ref 38–73)
NRBC BLD-RTO: 0 /100 WBC
PHOSPHATE SERPL-MCNC: 1.9 MG/DL (ref 2.7–4.5)
PLATELET # BLD AUTO: 322 K/UL (ref 150–450)
PMV BLD AUTO: 11 FL (ref 9.2–12.9)
POCT GLUCOSE: 110 MG/DL (ref 70–110)
POTASSIUM SERPL-SCNC: 3.3 MMOL/L (ref 3.5–5.1)
PROT SERPL-MCNC: 6.8 G/DL (ref 6–8.4)
RBC # BLD AUTO: 3.06 M/UL (ref 4–5.4)
SODIUM SERPL-SCNC: 142 MMOL/L (ref 136–145)
WBC # BLD AUTO: 12.58 K/UL (ref 3.9–12.7)

## 2023-06-25 PROCEDURE — 63600175 PHARM REV CODE 636 W HCPCS: Performed by: INTERNAL MEDICINE

## 2023-06-25 PROCEDURE — 25000003 PHARM REV CODE 250

## 2023-06-25 PROCEDURE — 25000003 PHARM REV CODE 250: Performed by: INTERNAL MEDICINE

## 2023-06-25 PROCEDURE — 80053 COMPREHEN METABOLIC PANEL: CPT | Performed by: STUDENT IN AN ORGANIZED HEALTH CARE EDUCATION/TRAINING PROGRAM

## 2023-06-25 PROCEDURE — 99239 HOSP IP/OBS DSCHRG MGMT >30: CPT | Mod: GT,,, | Performed by: INTERNAL MEDICINE

## 2023-06-25 PROCEDURE — 85025 COMPLETE CBC W/AUTO DIFF WBC: CPT | Performed by: FAMILY MEDICINE

## 2023-06-25 PROCEDURE — 25000003 PHARM REV CODE 250: Performed by: STUDENT IN AN ORGANIZED HEALTH CARE EDUCATION/TRAINING PROGRAM

## 2023-06-25 PROCEDURE — 84100 ASSAY OF PHOSPHORUS: CPT | Performed by: FAMILY MEDICINE

## 2023-06-25 PROCEDURE — 99239 PR HOSPITAL DISCHARGE DAY,>30 MIN: ICD-10-PCS | Mod: GT,,, | Performed by: INTERNAL MEDICINE

## 2023-06-25 PROCEDURE — 36415 COLL VENOUS BLD VENIPUNCTURE: CPT | Performed by: FAMILY MEDICINE

## 2023-06-25 PROCEDURE — 83735 ASSAY OF MAGNESIUM: CPT | Performed by: FAMILY MEDICINE

## 2023-06-25 PROCEDURE — 25000003 PHARM REV CODE 250: Performed by: HOSPITALIST

## 2023-06-25 RX ORDER — LANOLIN ALCOHOL/MO/W.PET/CERES
400 CREAM (GRAM) TOPICAL EVERY 4 HOURS
Status: DISCONTINUED | OUTPATIENT
Start: 2023-06-25 | End: 2023-06-25

## 2023-06-25 RX ORDER — POTASSIUM CHLORIDE 20 MEQ/1
40 TABLET, EXTENDED RELEASE ORAL ONCE
Status: COMPLETED | OUTPATIENT
Start: 2023-06-25 | End: 2023-06-25

## 2023-06-25 RX ORDER — POTASSIUM CHLORIDE 20 MEQ/1
40 TABLET, EXTENDED RELEASE ORAL ONCE
Status: DISCONTINUED | OUTPATIENT
Start: 2023-06-25 | End: 2023-06-25

## 2023-06-25 RX ORDER — INSULIN ASPART 100 [IU]/ML
5 INJECTION, SOLUTION INTRAVENOUS; SUBCUTANEOUS
Status: DISCONTINUED | OUTPATIENT
Start: 2023-06-25 | End: 2023-06-25 | Stop reason: HOSPADM

## 2023-06-25 RX ORDER — SODIUM,POTASSIUM PHOSPHATES 280-250MG
2 POWDER IN PACKET (EA) ORAL EVERY 4 HOURS
Status: DISCONTINUED | OUTPATIENT
Start: 2023-06-25 | End: 2023-06-25

## 2023-06-25 RX ORDER — SODIUM,POTASSIUM PHOSPHATES 280-250MG
2 POWDER IN PACKET (EA) ORAL
Status: COMPLETED | OUTPATIENT
Start: 2023-06-25 | End: 2023-06-25

## 2023-06-25 RX ORDER — HYDROCODONE BITARTRATE AND ACETAMINOPHEN 5; 325 MG/1; MG/1
1 TABLET ORAL EVERY 6 HOURS PRN
Qty: 20 TABLET | Refills: 0 | Status: SHIPPED | OUTPATIENT
Start: 2023-06-25 | End: 2023-06-30 | Stop reason: SDUPTHER

## 2023-06-25 RX ORDER — EVOLOCUMAB 140 MG/ML
140 INJECTION, SOLUTION SUBCUTANEOUS
Qty: 2 ML | Refills: 6
Start: 2023-06-25 | End: 2023-07-23

## 2023-06-25 RX ORDER — MAGNESIUM SULFATE HEPTAHYDRATE 40 MG/ML
2 INJECTION, SOLUTION INTRAVENOUS ONCE
Status: COMPLETED | OUTPATIENT
Start: 2023-06-25 | End: 2023-06-25

## 2023-06-25 RX ORDER — INSULIN DETEMIR 100 [IU]/ML
14 INJECTION, SOLUTION SUBCUTANEOUS NIGHTLY
Qty: 12 ML | Refills: 3 | Status: SHIPPED | OUTPATIENT
Start: 2023-06-25 | End: 2024-06-24

## 2023-06-25 RX ORDER — HYDRALAZINE HYDROCHLORIDE 25 MG/1
25 TABLET, FILM COATED ORAL EVERY 12 HOURS
Qty: 60 TABLET | Refills: 2 | Status: ON HOLD | OUTPATIENT
Start: 2023-06-25 | End: 2023-08-07 | Stop reason: HOSPADM

## 2023-06-25 RX ADMIN — Medication 400 MG: at 06:06

## 2023-06-25 RX ADMIN — POTASSIUM & SODIUM PHOSPHATES POWDER PACK 280-160-250 MG 2 PACKET: 280-160-250 PACK at 11:06

## 2023-06-25 RX ADMIN — HYDRALAZINE HYDROCHLORIDE 25 MG: 25 TABLET, FILM COATED ORAL at 08:06

## 2023-06-25 RX ADMIN — ISOSORBIDE DINITRATE 10 MG: 10 TABLET ORAL at 08:06

## 2023-06-25 RX ADMIN — PIPERACILLIN SODIUM AND TAZOBACTAM SODIUM 4.5 G: 4; .5 INJECTION, POWDER, LYOPHILIZED, FOR SOLUTION INTRAVENOUS at 03:06

## 2023-06-25 RX ADMIN — INSULIN ASPART 5 UNITS: 100 INJECTION, SOLUTION INTRAVENOUS; SUBCUTANEOUS at 08:06

## 2023-06-25 RX ADMIN — POTASSIUM & SODIUM PHOSPHATES POWDER PACK 280-160-250 MG 2 PACKET: 280-160-250 PACK at 10:06

## 2023-06-25 RX ADMIN — ASPIRIN 81 MG 81 MG: 81 TABLET ORAL at 08:06

## 2023-06-25 RX ADMIN — FAMOTIDINE 20 MG: 20 TABLET, FILM COATED ORAL at 08:06

## 2023-06-25 RX ADMIN — POTASSIUM CHLORIDE 40 MEQ: 1500 TABLET, EXTENDED RELEASE ORAL at 06:06

## 2023-06-25 RX ADMIN — AMLODIPINE BESYLATE 10 MG: 10 TABLET ORAL at 08:06

## 2023-06-25 RX ADMIN — HYDROMORPHONE HYDROCHLORIDE 0.5 MG: 1 INJECTION, SOLUTION INTRAMUSCULAR; INTRAVENOUS; SUBCUTANEOUS at 07:06

## 2023-06-25 RX ADMIN — POTASSIUM & SODIUM PHOSPHATES POWDER PACK 280-160-250 MG 2 PACKET: 280-160-250 PACK at 06:06

## 2023-06-25 RX ADMIN — POTASSIUM CHLORIDE 40 MEQ: 1500 TABLET, EXTENDED RELEASE ORAL at 11:06

## 2023-06-25 RX ADMIN — MAGNESIUM SULFATE 2 G: 2 INJECTION INTRAVENOUS at 08:06

## 2023-06-25 NOTE — ASSESSMENT & PLAN NOTE
- hold Pletal, ASA, and statin  - started on apixaban for PAD outpatient as well as A-fib  - held apixaban for ERCP and again for lap adenike  - Pletal, ASA, and Eliquis resumed at discharge

## 2023-06-25 NOTE — ASSESSMENT & PLAN NOTE
- UE/LE DVT   -hold apixaban in anticipation of lap adenike; bridge with Lovenox until surgery  -resume apixaban post-op at discharge  - resumed DVT prophylaxis 6/24

## 2023-06-25 NOTE — PLAN OF CARE
Follow up appointments to be scheduled and added to AVS/patient will be notified by phone as well. Family will provide transportation home.     [facility] to assume care on discharge.   06/25/23 1016   Final Note   Assessment Type Final Discharge Note   Anticipated Discharge Disposition Home   Hospital Resources/Appts/Education Provided Provided patient/caregiver with written discharge plan information;Appointments scheduled and added to AVS;Appointments scheduled by Navigator/Coordinator   Post-Acute Status   Discharge Delays None known at this time     [facility] - Tele[facility] Stepdown  Discharge Final Note    Primary Care Provider: Jasbir Haney MD    Expected Discharge Date: 6/25/2023    Final Discharge Note (most recent)       Final Note - 06/25/23 1016          Final Note    Assessment Type Final Discharge Note (P)      Anticipated Discharge Disposition Home or Self Care (P)      Hospital Resources/Appts/Education Provided Provided patient/caregiver with written discharge plan information;Appointments scheduled and added to AVS;Appointments scheduled by Navigator/Coordinator (P)         Post-Acute Status    Discharge Delays None known at this time (P)                      Important Message from Medicare             Contact Info       Jasbir Haney MD   Specialty: Internal Medicine   Relationship: PCP - General  Consulting Physician  Hypertension Digital Medicine Responsible Provider  Diabetes Digital Medicine Responsible Provider    [address] [facility] [address]   Phone: [phone]       Next Steps: Schedule an appointment as soon as possible for a visit in 1 week(s)    Instructions: For discharge from hospital follow up    [facility] GASTROENTEROLOGY   Specialty: Gastroenterology    [address]  [facility] [address]   Phone: [phone]       Next Steps: Schedule an appointment as soon as possible for a visit in 6 week(s)    Instructions: For discharge from hospital follow up, As previously  planned    [facility] GENERAL SURGERY   Specialty: General Surgery    [address]  [facility] [address]   Phone: [phone]       Next Steps: Schedule an appointment as soon as possible for a visit in 1 week(s)    Instructions: For discharge from hospital follow up, Wound check

## 2023-06-25 NOTE — ASSESSMENT & PLAN NOTE
Creatinine 1.7 on presentation ; baseline 1.0  Estimated Creatinine Clearance: 42 mL/min (based on SCr of 1.2 mg/dL).  - IVFs stopped, recurrent PAPA on 6/12 with FeNA indication pre-renal disease. 1L LR bolus ordered over 3 hrs  - worsening renal function in setting of hypotension on 6/15 - ATN felt present by Nephology in conjunction with some urinary retention issues.   - repeating renal ultrasound 6/19 to assess if improved urination has improved size left hydronephrosis - improved; consulted Urology who recommend checking postvoid residual  - Nephrology team consulted and have signed off now that function is improving  -encourage adequate po hydration

## 2023-06-25 NOTE — PLAN OF CARE
Problem: Adult Inpatient Plan of Care  Goal: Plan of Care Review  Outcome: Ongoing, Progressing     Problem: Diabetic Ketoacidosis  Goal: Fluid and Electrolyte Balance with Absence of Ketosis  Outcome: Ongoing, Progressing     Problem: Adjustment to Illness (Sepsis/Septic Shock)  Goal: Optimal Coping  Outcome: Ongoing, Progressing     Problem: Fall Injury Risk  Goal: Absence of Fall and Fall-Related Injury  Outcome: Ongoing, Progressing     Problem: Impaired Wound Healing  Goal: Optimal Wound Healing  Outcome: Ongoing, Progressing

## 2023-06-25 NOTE — SUBJECTIVE & OBJECTIVE
"Interval HPI:   Overnight events:  BG stable this morning with insulin adjustments.  No new overnight complaints reported.  Likely DC today.     Eating:   diabetic diet  Nausea: No  Hypoglycemia and intervention: No  Fever: No  TPN and/or TF: No  If yes, type of TF/TPN and rate: NA    /68   Pulse 79   Temp 98.7 °F (37.1 °C)   Resp 20   Ht 5' 4" (1.626 m)   Wt 62.8 kg (138 lb 7.2 oz)   LMP  (LMP Unknown)   SpO2 96%   Breastfeeding No   BMI 23.76 kg/m²     Labs Reviewed and Include    Recent Labs   Lab 06/25/23  0356   *   CALCIUM 8.3*   ALBUMIN 1.8*   PROT 6.8      K 3.3*   CO2 26      BUN 13   CREATININE 1.2   ALKPHOS 677*   ALT 45*   *   BILITOT 1.2*     Lab Results   Component Value Date    WBC 12.58 06/25/2023    HGB 8.1 (L) 06/25/2023    HCT 26.4 (L) 06/25/2023    MCV 86 06/25/2023     06/25/2023     No results for input(s): TSH, FREET4 in the last 168 hours.  Lab Results   Component Value Date    HGBA1C 8.1 (H) 05/25/2023       Nutritional status:   Body mass index is 23.76 kg/m².  Lab Results   Component Value Date    ALBUMIN 1.8 (L) 06/25/2023    ALBUMIN 1.7 (L) 06/24/2023    ALBUMIN 1.8 (L) 06/23/2023     No results found for: PREALBUMIN    Estimated Creatinine Clearance: 42 mL/min (based on SCr of 1.2 mg/dL).    Accu-Checks  Recent Labs     06/22/23 2025 06/23/23  0751 06/23/23  1036 06/23/23  1425 06/23/23  1819 06/23/23  2115 06/24/23  0807 06/24/23  1145 06/24/23  1610 06/24/23  2040   POCTGLUCOSE 238* 182* 165* 183* 259* 283* 223* 227* 189* 118*       Current Medications and/or Treatments Impacting Glycemic Control  Immunotherapy:    Immunosuppressants       None          Steroids:   Hormones (From admission, onward)      Start     Stop Route Frequency Ordered    06/09/23 2028  melatonin tablet 6 mg         -- Oral Nightly PRN 06/09/23 1931          Pressors:    Autonomic Drugs (From admission, onward)      None          Hyperglycemia/Diabetes " Medications:   Antihyperglycemics (From admission, onward)      Start     Stop Route Frequency Ordered    06/25/23 0715  insulin aspart U-100 pen 6 Units         -- SubQ 3 times daily with meals 06/24/23 1836 06/24/23 2100  insulin detemir U-100 (Levemir) pen 14 Units         -- SubQ Nightly 06/24/23 1836 06/23/23 2145  insulin aspart U-100 pen 0-5 Units         -- SubQ Before meals & nightly PRN 06/23/23 2045    06/10/23 1315  insulin regular in 0.9 % NaCl 100 unit/100 mL (1 unit/mL) infusion        Question:  Enter initial dose (Units/hr):  Answer:  0.6    06/10 2200 IV Continuous 06/10/23 1211

## 2023-06-25 NOTE — PROGRESS NOTES
[facility] - Telemetry Stepdown  Endocrinology  Progress Note    Admit Date: 2023     63 year old  female with type 2 diabetes mellitus, CKDIV, CAD, hypertension, hyperlipidemia, MURTAZA (not on CPAP), PAD, Afib and hx of DVT on Eliquis and recent admission for pancreatitis who presents with HHS and developing possible biliary obstruction.     - Patient recently admitted  to  for acute pancreatitis attributed to Trulicity, which was discontinued upon d/c. Per pt's PCP this was 2nd episode of pancreatitis while on trulicity -- prior episode when increasing dose of Trulicity.   Pt states upon d/c  she was feeling slight improvement but still having intermittent n/v and not tolerating normal diet. N/V began to get worse in last few days which prompted admission. No abdominal pain on presentation for current admission, unlike last admit when she had severe abd pain for pancreatitis.    In the ED patient afebrile and hemodynamically stable saturating well on room air. Lipase >2000. BG >700 and serum osmo 326 after 1L IVFs. B-hydroxybutyrate 1.3. pH 7.4 and bicarb 34. AG 12. Patient started on aggressive IVFs and insulin gtt for HHS.     - Endocrinology consulted for initial HHS and glucose management      Regarding Type 2 Diabetes Mellitus:    - Initially diagnosed with Type 2 diabetes mellitus: Over 20 years prior  - Home diabetic medications include: Farxiga 10mg qd, Glipizide ER 10mg daily, Levemir 20u qhs (but never started). She has been on MDI previously, most recently she was on Toujeo and Humalog discontinued 2021 but she states she was on insulin up until about 10mo ago.  - Family History: Not asked  - Weight based dosin kg x 0.4 (CKD) = 25 TDD x 0.5 = 12 basal / 12 prandial  - 1700/TDD = 68 (estimated insulin sensitivity factor)  - 450/TDD = 18 (estimated starting carb ratio for prandial dosing)    Lab Results   Component Value Date    HGBA1C 8.1 (H) 2023    HGBA1C 8.2  "(H) 04/05/2023    HGBA1C 8.8 (H) 03/29/2023    CPEPTIDE 1.36 06/12/2017     Lab Results   Component Value Date    EGFRNORACEVR 12.8 (A) 06/18/2023    EGFRNORACEVR 16.4 (A) 06/17/2023    EGFRNORACEVR 19.3 (A) 06/16/2023       Interval HPI:   Overnight events:  BG stable this morning with insulin adjustments.  No new overnight complaints reported.  Likely DC today.     Eating:   diabetic diet  Nausea: No  Hypoglycemia and intervention: No  Fever: No  TPN and/or TF: No  If yes, type of TF/TPN and rate: NA    /68   Pulse 79   Temp 98.7 °F (37.1 °C)   Resp 20   Ht 5' 4" (1.626 m)   Wt 62.8 kg (138 lb 7.2 oz)   LMP  (LMP Unknown)   SpO2 96%   Breastfeeding No   BMI 23.76 kg/m²     Labs Reviewed and Include    Recent Labs   Lab 06/25/23  0356   *   CALCIUM 8.3*   ALBUMIN 1.8*   PROT 6.8      K 3.3*   CO2 26      BUN 13   CREATININE 1.2   ALKPHOS 677*   ALT 45*   *   BILITOT 1.2*     Lab Results   Component Value Date    WBC 12.58 06/25/2023    HGB 8.1 (L) 06/25/2023    HCT 26.4 (L) 06/25/2023    MCV 86 06/25/2023     06/25/2023     No results for input(s): TSH, FREET4 in the last 168 hours.  Lab Results   Component Value Date    HGBA1C 8.1 (H) 05/25/2023       Nutritional status:   Body mass index is 23.76 kg/m².  Lab Results   Component Value Date    ALBUMIN 1.8 (L) 06/25/2023    ALBUMIN 1.7 (L) 06/24/2023    ALBUMIN 1.8 (L) 06/23/2023     No results found for: PREALBUMIN    Estimated Creatinine Clearance: 42 mL/min (based on SCr of 1.2 mg/dL).    Accu-Checks  Recent Labs     06/22/23 2025 06/23/23  0751 06/23/23  1036 06/23/23  1425 06/23/23  1819 06/23/23  2115 06/24/23  0807 06/24/23  1145 06/24/23  1610 06/24/23  2040   POCTGLUCOSE 238* 182* 165* 183* 259* 283* 223* 227* 189* 118*       Current Medications and/or Treatments Impacting Glycemic Control  Immunotherapy:    Immunosuppressants       None          Steroids:   Hormones (From admission, onward)      Start     " Stop Route Frequency Ordered    06/09/23 2028  melatonin tablet 6 mg         -- Oral Nightly PRN 06/09/23 1931          Pressors:    Autonomic Drugs (From admission, onward)      None          Hyperglycemia/Diabetes Medications:   Antihyperglycemics (From admission, onward)      Start     Stop Route Frequency Ordered    06/25/23 0715  insulin aspart U-100 pen 6 Units         -- SubQ 3 times daily with meals 06/24/23 1836 06/24/23 2100  insulin detemir U-100 (Levemir) pen 14 Units         -- SubQ Nightly 06/24/23 1836 06/23/23 2145  insulin aspart U-100 pen 0-5 Units         -- SubQ Before meals & nightly PRN 06/23/23 2045    06/10/23 1315  insulin regular in 0.9 % NaCl 100 unit/100 mL (1 unit/mL) infusion        Question:  Enter initial dose (Units/hr):  Answer:  0.6    06/10 2200 IV Continuous 06/10/23 1211            ASSESSMENT and PLAN    Cardiac/Vascular  Hyperlipidemia associated with type 2 diabetes mellitus  - On Lipitor 40mg and Repatha outpatient  - Management per primary    Hypertension associated with diabetes  On multiple anti-HTNs outpatient  Management per primary      Renal/  ATN (acute tubular necrosis)  PAPA 2/2 ATN  Improved -- Cr now 1.2  Keep renal function in mind when adjusting insulin doses as renal clearance impacts insulin requirement       PAPA (acute kidney injury)  - Improved/resolved. Likely 2/2 IVVD 2/2 HHS on admission  - Management per primary    Endocrine  Type 2 diabetes mellitus with chronic kidney disease  - HHS on admission. Recent pancreatitis admission, not yet back to normal po intake with worsening hepatic function. Taking Glipizide and Farxiga at home. Never picked up Levemir Rx recently prescribed by her PCP    - 24 hour glucose trend: 110-220s    -  Continue Levemir 14 units nightly (adjusted overnight)  -  Decreased Novolog to 5 units before meals + continue low dose correction Novolog prn     - Hypoglycemia protocol   - If blood glucose greater than 300, please ask  patient not to eat food or drink anything other than water until correctional insulin has brought it back below 250    **Please notify endocrine of change in diet or plans for NPO for surgery (or any other reason) as this may change her insulin requirement**      **DC Recommendations:**    -- Discontinue Trulicity and remove from medication list    -- Continue Levemir 14 units nightly  -- Continue home Glipizide XR 10 mg once daily with breakfast  -- Continue Farxiga 10 mg once daily as long as patient is tolerating oral intake without nausea or vomiting.  Hold if symptoms of N/V.  If resumed and symptoms of nasuea or poor PO intake occur she should hold medicine until she feels better.    -- She should monitor BG closely before every meal and at bedtime and bring glucose logs to her office visits  -- She needs to be seen soon by her PCP for further medication adjustment.  If blood glucose frequently less than 90 or above 180 she needs to reach out to her PCP office for recommendations.        GI  History of pancreatitis  - She has now had two episodes of pancreatitis on Trulicity, however dx of biliary pancreatitis per primary and GI based on eval this admission   - Episodes of pancreatitis likely multifactorial with GLP1 as contributing factor  - Laparoscopic cholecystectomy completed on Friday 6/23   - Hold Trulicity at discharge        Tristan Norris,   Endocrinology

## 2023-06-25 NOTE — ASSESSMENT & PLAN NOTE
"Lipase >2000 on presentation, downtrending without any identifiable cause for elevation. Patient without any abdominal pain. Discontinued HCTZ as it could potentially be cause of pancreatitis as well  - US abdomen with gallbladder sludge and "prominence" of the pancreatic duct   - liver enzymes, alk-phos, bilirubin elevated on 06/11, suggestive of possible choledocholithiasis as cause of pancreatitis.  MRCP ordered  - MRCP on my personal interpretation without any focal obstruction or retained stones, however liver enzymes continue to elevate  - AES was consulted for findings and after discussion, recommended ERCP/EUS once off apixaban and Pletal which was completed on 6/15 - biliary sludging found, CBD dilated.   - resumed Zosyn due to elevated WBC and alk phos; bland diet  - General surgery assessed for cholecystectomy - lap adenike 6/23/2023 with apixaban held post-op - incisional bleeding discussed with Gen Surg  - patient s/p lap adenike on 6/23: per Gen Surg: clears for now, antibiotics for at least 48 hours, monitor drain output, Okay for DVT ppx, continue to hold anticoagulation.  - On 6/25, per Gen Surg:  Advance diet as tolerated. Okay to stop antibiotics. Drain removed at bedside. Okay for resumption of anticoagulation  "

## 2023-06-25 NOTE — ASSESSMENT & PLAN NOTE
Restarted home amlodipine on 6/19  -holding ARB due to PAPA  -resumed nitrate and added hydralazine  -continue to monitor and adjust regimen as necessary  - Follow up with PCP to discuss resumption of ARB

## 2023-06-25 NOTE — SUBJECTIVE & OBJECTIVE
Interval History: NAEO. Drain benign. Tolerating diet. AF. HDS.     Medications:  Continuous Infusions:   lactated ringers Stopped (06/24/23 1859)     Scheduled Meds:   amLODIPine  10 mg Oral Daily    aspirin  81 mg Oral Daily    enoxparin  30 mg Subcutaneous Q24H (prophylaxis, 1700)    famotidine  20 mg Oral Daily    hydrALAZINE  25 mg Oral Q12H    insulin aspart U-100  5 Units Subcutaneous TIDWM    insulin detemir U-100  14 Units Subcutaneous QHS    isosorbide dinitrate  10 mg Oral BID    magnesium sulfate IVPB  2 g Intravenous Once    piperacillin-tazobactam (Zosyn) IV (PEDS and ADULTS) (extended infusion is not appropriate)  4.5 g Intravenous Q8H    potassium chloride  40 mEq Oral Once    potassium, sodium phosphates  2 packet Oral Q4H     PRN Meds:acetaminophen, albuterol, dextrose 10%, dextrose 10%, dextrose, dextrose, glucagon (human recombinant), hydrALAZINE, HYDROmorphone, insulin aspart U-100, melatonin, ondansetron, oxyCODONE, prochlorperazine, sodium chloride 0.9%     Review of patient's allergies indicates:   Allergen Reactions    Milk containing products (dairy)      Causes GI distress     Objective:     Vital Signs (Most Recent):  Temp: 98.4 °F (36.9 °C) (06/25/23 0740)  Pulse: 82 (06/25/23 0740)  Resp: 18 (06/25/23 0740)  BP: (!) 158/75 (06/25/23 0740)  SpO2: (!) 92 % (06/25/23 0740) Vital Signs (24h Range):  Temp:  [97.6 °F (36.4 °C)-98.7 °F (37.1 °C)] 98.4 °F (36.9 °C)  Pulse:  [77-82] 82  Resp:  [18-20] 18  SpO2:  [91 %-98 %] 92 %  BP: (105-158)/(55-75) 158/75     Weight: 62.8 kg (138 lb 7.2 oz)  Body mass index is 23.76 kg/m².    Intake/Output - Last 3 Shifts         06/23 0700  06/24 0659 06/24 0700  06/25 0659 06/25 0700  06/26 0659    P.O. 360 1320     IV Piggyback 1000 100     Total Intake(mL/kg) 1360 (21.7) 1420 (22.6)     Urine (mL/kg/hr) 0 (0) 0 (0)     Drains 50 10     Stool  0     Total Output 50 10     Net +1310 +1410            Urine Occurrence 1 x 5 x     Stool Occurrence  0 x               Physical Exam  Vitals and nursing note reviewed.   Constitutional:       General: She is not in acute distress.     Appearance: Normal appearance.   HENT:      Head: Normocephalic and atraumatic.   Eyes:      Extraocular Movements: Extraocular movements intact.   Cardiovascular:      Rate and Rhythm: Normal rate.   Pulmonary:      Effort: Pulmonary effort is normal. No tachypnea or respiratory distress.   Abdominal:      General: There is no distension.      Palpations: Abdomen is soft.      Tenderness: There is no abdominal tenderness.      Comments: Soft, attp, ND  Incisions clear  Drain benign    Neurological:      General: No focal deficit present.      Mental Status: She is alert and oriented to person, place, and time.      Cranial Nerves: No cranial nerve deficit.      Motor: No weakness.   Psychiatric:         Attention and Perception: Attention normal.         Mood and Affect: Mood and affect normal.         Speech: Speech normal.         Behavior: Behavior is cooperative.        Significant Labs:  I have reviewed all pertinent lab results within the past 24 hours.    Significant Diagnostics:  I have reviewed all pertinent imaging results/findings within the past 24 hours.

## 2023-06-25 NOTE — DISCHARGE SUMMARY
Jose Frey - Telemetry [facility] Medicine  Telemedicine Discharge Summary      Patient Name: Mariann Huff  MRN: [id number]  Patient Class: IP- Inpatient  Admission Date: 6/9/2023  Hospital Length of Stay: 16 days  Discharge Date and Time:  06/25/2023 12:36 PM  Attending Physician: Tessa Odonnell MD   Discharging Provider: Tessa Odonnell MD  Primary Care Provider: Jasbir Haney MD      HPI:   63F with PMH of CAD with GINGER , HTN, HLD, DM2, MURTAZA (not on CPAP), PAD, Afib and hx of DVT on Eliquis and recent admission for pancreatitis wo presents to the ED with nausea and vomiting. Patient recently admitted 05/25 to 05/26 for first episode of acute pancreatitis. Lipase >2000 at that time. CT abdomen and US without evidence of biliary obstruction or pancreatic inflammation. Lipid panel wnl. Patient pain and n/v improved with treatment for acute pancreatitis felt secondary to home med Trulicity. She was discharged with new insulin regimen which she reports she never picked up from pharmacy so has been taking farxiga only at home. States that she has had progressively worsening n/v and po intolerance. States that she is not having severe pain similar to recent pancreatitis. Denies fevers, chills, chest pain, shortness of breath.     In the ED patient afebrile and hemodynamically stable saturating well on room air. Lipase >2000. BG >700 and serum osmo 326 after 1L IVFs. B-hydroxybutyrate 1.3. pH 7.4 and bicarb 34. AG 12. Patient started on aggressive IVFs and insulin gtt for management on pancreatitis and HHS. Admitted to the care of medicine for further evaluation and management.       Procedure(s) (LRB):  CHOLECYSTECTOMY, LAPAROSCOPIC (N/A)      Hospital Course:   Patient admitted on 06/09 for management of T2DM with HHS. Endocrinology consulted. Transitioned from insulin drip to subcutaneous insulin on 06/10.  On 06/11, patient developed new hyperbilirubinemia and worsening liver enzymes and given history of  pancreatitis concern for possible choledocholithiasis.  MRCP ordered did not demonstrate any focal obstruction showever liver enzymes and alk phos increasing in size. In speaking with [facility], patient to be off apixaban and pletal prior to ERCP.  Hospital course associated with development of recurrent pancreatitis with development of cholangitis by 06/13, and PAPA and metabolic encephalopathy on 06/14. Work-up significant for MRI/MRCP on 06/11 showing inflammatory changes around pancreatic head (unclear if new or residual inflammation from prior episode of pancreatitis) with gallstone sludge, but without biliary dilatation or choledocholithiasis. She is status post EUS/ERCP on 06/15 showing large amount of gallbladder sludge with localized biliary stricture in the lower third of the main duct that was suspected to be from inflammation from acute pancreatitis; status post sphincterotomy with drainage of gallbladder sludge and placement of plastic stent into the common bile duct. Post-procedure, encephalopathy resolved, however PAPA and progressive hyperbilirubinemia on-going.  PAPA has now resolved. General surgery evaluation for consideration of cholecystectomy with suspicion for large amount of gallbladder sludge in bile ducts causing current episode of pancreatitis; although Trulicity suspected. General Surgery consulted for evaluation for cholecystectomy now s/p laparoscopic subtotal cholecystectomy with drain placement on 6/23/23.     See Problems listed below for additional details of this hospital stay.      Goals of Care Treatment Preferences:  Code Status: Full Code      Consults:   Consults (From admission, onward)          Status Ordering Provider     Inpatient consult to Urology  Once        Provider:  (Not yet assigned)    Completed JG SANDHU     Inpatient virtual consult to Hospital Medicine  Once        Provider:  (Not yet assigned)    Completed DERECK MCKNIGHT     Inpatient consult to General  Surgery  Once        Provider:  (Not yet assigned)    Completed DERECK MCKNIGHT     Inpatient consult to Critical Care Medicine  Once        Provider:  (Not yet assigned)    Completed DERECK MCKNIGHT     Inpatient consult to Nephrology  Once        Provider:  (Not yet assigned)    Completed DERECK MCKNIGHT     Inpatient consult to Midline team  Once        Provider:  (Not yet assigned)    Completed MARY ALICE MESSINA     Inpatient consult to [facility]  Once        Provider:  (Not yet assigned)    Completed MARY ALICE MESSINA     Inpatient consult to Endocrinology  Once        Provider:  (Not yet assigned)    Completed DENNYS CASTELLON     Inpatient consult to Registered Dietitian/Nutritionist  Once        Provider:  (Not yet assigned)    Completed DENNYS CASTELLON            Pulmonary  Asthma  - albuterol prn  - stable on room air    Cardiac/Vascular  PAD (peripheral artery disease)  - hold Pletal, ASA, and statin  - started on apixaban for PAD outpatient as well as A-fib  - held apixaban for ERCP and again for lap adenike  - Pletal, ASA, and Eliquis resumed at discharge    Hyperlipidemia associated with type 2 diabetes mellitus  Holding statin  Resumed at discharge    Hypertension associated with diabetes  Restarted home amlodipine on 6/19  -holding ARB due to PAPA  -resumed nitrate and added hydralazine  -continue to monitor and adjust regimen as necessary  - Follow up with PCP to discuss resumption of ARB    Renal/  Hypophosphatemia  Replete as indicated to goal phos of 3  Continue to monitor.  Patient does not like hospital food and was not eating well    Hyperkalemia  Due to PAPA, resolving    Hypokalemia  -initially hyperkalemia at admit but developed hypokalemia as renal function improved  -very judicious repletion of potassium due to history of hyperkalemia/PAPA    Hydronephrosis  - repeat renal ultrasound ordered for 6/19 with improvement  -urology does not plan any intervention  currently  -post void residual with 0 mL volume on bladder scan    ATN (acute tubular necrosis)  Patient with acute kidney injury likely due to acute tubular necrosis PAPA is currently improving. Labs reviewed- Renal function/electrolytes with Estimated Creatinine Clearance: 42 mL/min (based on SCr of 1.2 mg/dL). according to latest data. Monitor urine output and serial BMP and adjust therapy as needed. Avoid nephrotoxins and renally dose meds for GFR listed above.     PAPA (acute kidney injury)  Creatinine 1.7 on presentation ; baseline 1.0  Estimated Creatinine Clearance: 42 mL/min (based on SCr of 1.2 mg/dL).  - IVFs stopped, recurrent PAPA on 6/12 with FeNA indication pre-renal disease. 1L LR bolus ordered over 3 hrs  - worsening renal function in setting of hypotension on 6/15 - ATN felt present by Nephology in conjunction with some urinary retention issues.   - repeating renal ultrasound 6/19 to assess if improved urination has improved size left hydronephrosis - improved; consulted Urology who recommend checking postvoid residual  - Nephrology team consulted and have signed off now that function is improving  -encourage adequate po hydration    Hematology  History of DVT (deep vein thrombosis)  - UE/LE DVT   -hold apixaban in anticipation of lap adenike; bridge with Lovenox until surgery  -resume apixaban post-op at discharge  - resumed DVT prophylaxis 6/24    Endocrine  * Hyperosmolar hyperglycemic state (HHS)  See Diabetes    Moderate malnutrition  Nutrition consulted. Most recent weight and BMI monitored-     Measurements:  Wt Readings from Last 1 Encounters:   06/23/23 62.8 kg (138 lb 7.2 oz)   Body mass index is 23.76 kg/m².    Patient has been screened and assessed by RD.    Malnutrition Type:  Context: acute illness or injury  Level: moderate    Malnutrition Characteristic Summary:  Weight Loss (Malnutrition): 10% in 6 months  Energy Intake (Malnutrition): less than 75% for greater than 7  days    Interventions/Recommendations (treatment strategy):  1. When able, resume diet. Rec'd Diabetic/Lactose Restricted diet + Boost Glucose Control ONS. Encourage PO intake as tolerated. 2. RD to monitor & follow-up.    Type 2 diabetes mellitus with chronic kidney disease  IP HHS/DKA Pathway initiated. On presentation BG >700 and serum osmo 326 after 1L IVFs. B-hydroxybutyrate 1.3. pH 7.4 and bicarb 34. AG 12. Patient was started on insulin drip on admission but later in the day, patient transitioned to Levemir 14 units at night and aspart 3 units with meals.  Started on diabetic diet  - patient currently on per Endocrinology:  Antihyperglycemics (From admission, onward)      Start     Stop Route Frequency Ordered    06/25/23 0715  insulin aspart U-100 pen 5 Units         -- SubQ 3 times daily with meals 06/25/23 0641    06/24/23 2100  insulin detemir U-100 (Levemir) pen 14 Units         -- SubQ Nightly 06/24/23 1836    06/23/23 2145  insulin aspart U-100 pen 0-5 Units         -- SubQ Before meals & nightly PRN 06/23/23 2045    06/10/23 1315  insulin regular in 0.9 % NaCl 100 unit/100 mL (1 unit/mL) infusion        Question:  Enter initial dose (Units/hr):  Answer:  0.6    06/10 2200 IV Continuous 06/10/23 1211          - further insulin titration per Endocrinology  - POC AC/HS  - Final recommendations per Endocrinology:  -- Discontinue Trulicity and remove from medication list  -- Continue Levemir 14 units nightly  -- Continue home Glipizide XR 10 mg once daily with breakfast  -- Continue Farxiga 10 mg once daily as long as patient is tolerating oral intake without nausea or vomiting.  Hold if symptoms of N/V.  If resumed and symptoms of nasuea or poor PO intake occur she should hold medicine until she feels better.  -- She should monitor BG closely before every meal and at bedtime and bring glucose logs to her office visits  -- She needs to be seen soon by her PCP for further medication adjustment.  If blood  "glucose frequently less than 90 or above 180 she needs to reach out to her PCP office for recommendations.      GI  Hyperbilirubinemia  - [facility] completed ERCP with intervention   -improving    History of pancreatitis  See acute pancreatitis    Acute pancreatitis  Lipase >2000 on presentation, downtrending without any identifiable cause for elevation. Patient without any abdominal pain. Discontinued HCTZ as it could potentially be cause of pancreatitis as well  - US abdomen with gallbladder sludge and "prominence" of the pancreatic duct   - liver enzymes, alk-phos, bilirubin elevated on 06/11, suggestive of possible choledocholithiasis as cause of pancreatitis.  MRCP ordered  - MRCP on my personal interpretation without any focal obstruction or retained stones, however liver enzymes continue to elevate  - [facility] was consulted for findings and after discussion, recommended ERCP/EUS once off apixaban and Pletal which was completed on 6/15 - biliary sludging found, CBD dilated.   - resumed Zosyn due to elevated WBC and alk phos; bland diet  - General surgery assessed for cholecystectomy - lap adenike 6/23/2023 with apixaban held post-op - incisional bleeding discussed with Gen Surg  - patient s/p lap adenike on 6/23: per Gen Surg: clears for now, antibiotics for at least 48 hours, monitor drain output, Okay for DVT ppx, continue to hold anticoagulation.  - On 6/25, per Gen Surg:  Advance diet as tolerated. Okay to stop antibiotics. Drain removed at bedside. Okay for resumption of anticoagulation      Final Active Diagnoses:    Diagnosis Date Noted POA    PRINCIPAL PROBLEM:  Hyperosmolar hyperglycemic state (HHS) [E11.00] 06/20/2023 Yes    History of pancreatitis [Z87.19] 06/10/2023 Not Applicable    Acute pancreatitis [K85.90] 05/25/2023 Yes    Type 2 diabetes mellitus with chronic kidney disease [E11.22] 06/09/2023 Yes    Hypertension associated with diabetes [E11.59, I15.2]  Yes     Chronic    Hyperlipidemia associated with " type 2 diabetes mellitus [E11.69, E78.5]  Yes     Chronic    ATN (acute tubular necrosis) [N17.0] 06/16/2023 Yes    PAPA (acute kidney injury) [N17.9] 02/17/2014 Yes    Asthma [J45.909] 04/20/2019 Yes     Chronic    History of DVT (deep vein thrombosis) [Z86.718] 06/09/2023 Not Applicable    PAD (peripheral artery disease) [I73.9] 05/04/2022 Yes     Chronic    Hydronephrosis [N13.30] 10/07/2019 Yes    Hypokalemia [E87.6] 06/16/2023 Yes    Hypophosphatemia [E83.39] 06/21/2023 Yes    Hyperkalemia [E87.5] 06/20/2023 Yes    Hyperbilirubinemia [E80.6] 06/12/2023 Yes    Moderate malnutrition [E44.0]  Yes      Problems Resolved During this Admission:    Diagnosis Date Noted Date Resolved POA    Sepsis with acute liver failure without hepatic coma or septic shock [A41.9, R65.20, K72.00] 04/20/2019 06/19/2023 No       Discharged Condition: stable    Disposition: Home-Health Care [facility]    Follow Up:   Follow-up Information       Jasbir Haney MD. Schedule an appointment as soon as possible for a visit in 1 week(s).    Specialty: Internal Medicine  Why: For discharge from hospital follow up  Contact information:  [address] [facility] [address]  [phone]               [facility] GASTROENTEROLOG[facility]. Schedule an appointment as soon as possible for a visit in 6 week(s).    Specialty: Gastroenterology  Why: For discharge from hospital follow up, As previously planned  Contact information:  [address] [facility] [address]  [phone]             [facility] GENERAL SURGERY. Schedule an appointment as soon as possible for a visit in 1 week(s).    Specialty: General Surgery  Why: For discharge from hospital follow up, Wound check  Contact information:  [address] [facility] [address]  [phone]             [facility] Follow up.    Specialties: Home Health Services, Home Therapy Services, Home Living Aide Services  Contact information:  [address]  [facility] [address]  [phone]                         Patient Instructions:      Ambulatory referral/consult to Gastroenterology   Standing Status: Future   Referral Priority: Routine Referral Type: Consultation   Referral Reason: Specialty Services Required   Requested Specialty: Gastroenterology   Number of Visits Requested: 1     Ambulatory referral/consult to General Surgery   Standing Status: Future   Referral Priority: Routine Referral Type: Consultation   Referral Reason: Specialty Services Required   Requested Specialty: General Surgery   Number of Visits Requested: 1     Ambulatory referral/consult to Diabetes Education   Standing Status: Future   Referral Priority: Routine Referral Type: Consultation   Referral Reason: Specialty Services Required   Requested Specialty: Diabetes   Number of Visits Requested: 1 Expiration Date: 06/25/24     Ambulatory referral/consult to Outpatient Case Management   Referral Priority: Routine Referral Type: Consultation   Referral Reason: Specialty Services Required   Number of Visits Requested: 1     Diet diabetic     Lifting restrictions     Leave dressing on - Keep it clean, dry, and intact until clinic visit     Notify your health care provider if you experience any of the following:  temperature >100.4     Notify your health care provider if you experience any of the following:  persistent nausea and vomiting or diarrhea     Notify your health care provider if you experience any of the following:  severe uncontrolled pain     Notify your health care provider if you experience any of the following:  redness, tenderness, or signs of infection (pain, swelling, redness, odor or green/yellow discharge around incision site)     Notify your health care provider if you experience any of the following:  difficulty breathing or increased cough     Notify your health care provider if you experience any of the following:  severe persistent headache     Notify your  health care provider if you experience any of the following:  persistent dizziness, light-headedness, or visual disturbances     Notify your health care provider if you experience any of the following:  increased confusion or weakness     Activity as tolerated       Significant Diagnostic Studies:   Recent Labs   Lab 03/29/23  0807 04/05/23  0928 05/25/23  1134   HGBA1C 8.8* 8.2* 8.1*     Recent Labs   Lab 06/23/23  0330 06/24/23 0447 06/25/23  0356   WBC 16.01* 17.44* 12.58   HGB 8.6* 7.9* 8.1*   HCT 27.6* 24.3* 26.4*    310 322     Recent Labs   Lab 06/23/23 0330 06/24/23 0447 06/25/23  0356   GRAN 75.8*  12.1* 83.8*  14.6* 75.7*  9.5*   LYMPH 12.2*  2.0 8.3*  1.4 14.1*  1.8   MONO 8.0  1.3* 6.3  1.1* 8.0  1.0   EOS 0.3 0.0 0.1     Recent Labs   Lab 06/23/23 0330 06/24/23 0447 06/25/23  0356    143 142   K 4.2 4.1 3.3*    106 104   CO2 26 23 26   BUN 18 17 13   CREATININE 1.7* 1.4 1.2   * 208* 117*   CALCIUM 9.0 8.3* 8.3*   ALBUMIN 1.8* 1.7* 1.8*   MG 1.6 1.6 1.6   PHOS 2.2* 2.9 1.9*     Recent Labs   Lab 06/23/23 0330 06/24/23 0447 06/25/23  0356   ALKPHOS 810* 719* 677*   ALT 50* 55* 45*   AST 75* 173* 102*   PROT 6.7 6.3 6.8   BILITOT 1.4* 1.1* 1.2*     Recent Labs   Lab 06/09/23 2004 06/13/23  0813 06/14/23  2238   LACTATE 1.6 1.3 1.6     SARS-CoV2 (COVID-19) Qualitative PCR (no units)   Date Value   09/25/2021 Not Detected   12/07/2020 Not Detected       ECG Results    None         Results for orders placed during the hospital encounter of 05/25/23    Echo    Interpretation Summary  · The estimated ejection fraction is 55%.  · The quantitatively derived ejection fraction is 52%.  · Normal right ventricular size with normal right ventricular systolic function.  · Normal left ventricular diastolic function.  · The left ventricle is normal in size with normal systolic function.  · Normal central venous pressure (3 mmHg).      US Kidney  Narrative: EXAMINATION:  US  KIDNEY    CLINICAL HISTORY:  Silvino, hydronephrosis;    TECHNIQUE:  Ultrasound of the kidneys was performed including color flow and Doppler evaluation of the kidneys.    COMPARISON:  Ultrasound retroperitoneal 06/16/2023    FINDINGS:  Right kidney: The right kidney measures 12.2 cm. No cortical thinning. No loss of corticomedullary distinction. Resistive index measures 0.86.  Upper pole 0.8 x 11.0 x 0.7 cm simple cyst, previously 0.8 x 0.8 x 1.0 cm.  No mass. No renal stone. No hydronephrosis.    Left kidney: The left kidney measures 12.1 cm. No cortical thinning. No loss of corticomedullary distinction. Resistive index measures 0.86.  Upper pole 1.9 x 2.3 x 2.0 cm simple cyst, previously 2.2 x 1.9 x 12.4 cm.  Lower pole 2.0 x 2.2 x 3.2 cm minimally complex cyst with 2 mm septation, previously 2.4 x 3.1 x 2.8 cm.  No mass. No renal stone. Mild hydronephrosis, improved from prior.    Splenic resistive index is 0.83.  Impression: Mild left hydronephrosis, improved compared to prior exam.    Elevated arterial resistive indices which may represent medical renal disease.    Bilateral simple and left minimally complex renal cysts.    Electronically signed by resident: Christian Peres  Date:    06/19/2023  Time:    21:36    Electronically signed by: Tristan Santiago  Date:    06/20/2023  Time:    08:51    Pending Diagnostic Studies:       Procedure Component Value Units Date/Time    Specimen to Pathology, Surgery General Surgery [[id number]] Collected: 06/23/23 1356    Order Status: Sent Lab Status: In process Updated: 06/23/23 2202    Specimen: Tissue            Medications:  Reconciled Home Medications:      Medication List        START taking these medications      hydrALAZINE 25 MG tablet  Commonly known as: APRESOLINE  Take 1 tablet (25 mg total) by mouth every 12 (twelve) hours.     HYDROcodone-acetaminophen 5-325 mg per tablet  Commonly known as: NORCO  Take 1 tablet by mouth every 6 (six) hours as needed for Pain.         "    CHANGE how you take these medications      LEVEMIR FLEXPEN 100 unit/mL (3 mL) Inpn pen  Generic drug: insulin detemir U-100 (Levemir)  Inject 14 Units into the skin every evening.  What changed: how much to take     * pen needle, diabetic 32 gauge x 1/5" Ndle  Commonly known as: NOVOTWIST  Use 1 needle to inject insulin four times daily  What changed: Another medication with the same name was added. Make sure you understand how and when to take each.     * BD ULTRA-FINE GRABIEL PEN NEEDLE 32 gauge x 5/32" Ndle  Generic drug: pen needle, diabetic  Use as directed  What changed: You were already taking a medication with the same name, and this prescription was added. Make sure you understand how and when to take each.           * This list has 2 medication(s) that are the same as other medications prescribed for you. Read the directions carefully, and ask your doctor or other care provider to review them with you.                CONTINUE taking these medications      ACCU-CHEK EDIN PLUS METER Misc  Generic drug: blood-glucose meter     albuterol 90 mcg/actuation inhaler  Commonly known as: PROVENTIL/VENTOLIN HFA  Inhale 2 puffs into the lungs every 6 (six) hours as needed for Wheezing.     amLODIPine 10 MG tablet  Commonly known as: NORVASC  Take 1 tablet (10 mg total) by mouth once daily.     aspirin 81 mg Tab  Take 81 mg by mouth once daily. 1 Tablet Oral Every day     atorvastatin 40 MG tablet  Commonly known as: LIPITOR  Take 1 tablet (40 mg total) by mouth every evening.     blood sugar diagnostic Strp  Commonly known as: ACCU-CHEK EDIN PLUS TEST STRP  TEST BLOOD SUGARS 4 TIMES DAILY     cilostazoL 100 MG Tab  Commonly known as: PLETAL  Take 1 tablet (100 mg total) by mouth 2 (two) times daily.     diclofenac sodium 1 % Gel  Commonly known as: VOLTAREN  Apply 2 g topically 3 (three) times daily. Apply to the area of pain 2-3x per day or night as needed     ELIQUIS 2.5 mg Tab  Generic drug: apixaban  Take 1 " tablet (2.5 mg total) by mouth 2 (two) times daily.     EScitalopram oxalate 10 MG tablet  Commonly known as: LEXAPRO  Take 1 tablet (10 mg total) by mouth once daily.     FARXIGA 10 mg tablet  Generic drug: dapagliflozin propanediol  Take 1 tablet (10 mg total) by mouth once daily.     gabapentin 300 MG capsule  Commonly known as: NEURONTIN  Take 1 capsule (300 mg) in the morning and 2 capsules (600 mg) in the evening     glipiZIDE 10 MG Tr24  Commonly known as: GLUCOTROL  Take 1 tablet (10 mg total) by mouth daily with breakfast.     isosorbide mononitrate 10 mg tablet  Commonly known as: ISMO,MONOKET  Take 1 tablet (10 mg total) by mouth once daily.     multivitamin per tablet  Commonly known as: THERAGRAN  Take 1 tablet by mouth once daily.     REPATHA SURECLICK 140 mg/mL Pnij  Generic drug: evolocumab  Inject 1 mL (140 mg total) into the skin every 14 (fourteen) days.            STOP taking these medications      eszopiclone 2 MG Tab  Commonly known as: LUNESTA     telmisartan 80 MG Tab  Commonly known as: MICARDIS              Indwelling Lines/Drains at time of discharge:   Lines/Drains/Airways       None       Time spent on the discharge of patient: 50 minutes  This service was provided by Virtual Visit with a Telepresenter.   Patient was seen and examined on the date of discharge.  Additional time was spent speaking with consultants and case management, reviewing records, and/or discussing the plan of care with patient/family.    The patient location: [address] [place] [address] A  Admitted 6/9/2023  2:20 PM  Patient was transferred to [facility] Medicine on:  06/20/2023  This document was partially prepared by chart review and may not directly reflect my personal knowledge of the patient's case, clinical course, or significant events during the hospital stay.     The attending portion of this evaluation, treatment, and documentation was performed per Tessa Odonnell MD via Telemedicine AudioVisual using the link bird  Blog[employer] software platform with 2 way audio/video. The provider was located off-site and the patient is located in the hospital. The aforementioned video software was utilized to document the relevant history and physical exam    Tessa Odonnell MD  Department of Hospital Medicine  [facility] - Telemetry Stepdown

## 2023-06-25 NOTE — ASSESSMENT & PLAN NOTE
IP HHS/DKA Pathway initiated. On presentation BG >700 and serum osmo 326 after 1L IVFs. B-hydroxybutyrate 1.3. pH 7.4 and bicarb 34. AG 12. Patient was started on insulin drip on admission but later in the day, patient transitioned to Levemir 14 units at night and aspart 3 units with meals.  Started on diabetic diet  - patient currently on per Endocrinology:  Antihyperglycemics (From admission, onward)    Start     Stop Route Frequency Ordered    06/25/23 0715  insulin aspart U-100 pen 5 Units         -- SubQ 3 times daily with meals 06/25/23 0641    06/24/23 2100  insulin detemir U-100 (Levemir) pen 14 Units         -- SubQ Nightly 06/24/23 1836    06/23/23 2145  insulin aspart U-100 pen 0-5 Units         -- SubQ Before meals & nightly PRN 06/23/23 2045    06/10/23 1315  insulin regular in 0.9 % NaCl 100 unit/100 mL (1 unit/mL) infusion        Question:  Enter initial dose (Units/hr):  Answer:  0.6    06/10 2200 IV Continuous 06/10/23 1211        - further insulin titration per Endocrinology  - POC AC/HS  - Final recommendations per Endocrinology:  -- Discontinue Trulicity and remove from medication list  -- Continue Levemir 14 units nightly  -- Continue home Glipizide XR 10 mg once daily with breakfast  -- Continue Farxiga 10 mg once daily as long as patient is tolerating oral intake without nausea or vomiting.  Hold if symptoms of N/V.  If resumed and symptoms of nasuea or poor PO intake occur she should hold medicine until she feels better.  -- She should monitor BG closely before every meal and at bedtime and bring glucose logs to her office visits  -- She needs to be seen soon by her PCP for further medication adjustment.  If blood glucose frequently less than 90 or above 180 she needs to reach out to her PCP office for recommendations.

## 2023-06-25 NOTE — ASSESSMENT & PLAN NOTE
PAPA 2/2 ATN  Improved -- Cr now 1.2  Keep renal function in mind when adjusting insulin doses as renal clearance impacts insulin requirement

## 2023-06-25 NOTE — PROGRESS NOTES
[facility] - Tele[facility] Stepdown  General Surgery  Progress Note    Subjective:     History of Present Illness:  No notes on file    Post-Op Info:  Procedure(s) (LRB):  CHOLECYSTECTOMY, LAPAROSCOPIC (N/A)   2 Days Post-Op     Interval History: NAEO. Drain benign. Tolerating diet. AF. HDS.     Medications:  Continuous Infusions:   lactated ringers Stopped (06/24/23 1859)     Scheduled Meds:   amLODIPine  10 mg Oral Daily    aspirin  81 mg Oral Daily    enoxparin  30 mg Subcutaneous Q24H (prophylaxis, 1700)    famotidine  20 mg Oral Daily    hydrALAZINE  25 mg Oral Q12H    insulin aspart U-100  5 Units Subcutaneous TIDWM    insulin detemir U-100  14 Units Subcutaneous QHS    isosorbide dinitrate  10 mg Oral BID    magnesium sulfate IVPB  2 g Intravenous Once    piperacillin-tazobactam (Zosyn) IV (PEDS and ADULTS) (extended infusion is not appropriate)  4.5 g Intravenous Q8H    potassium chloride  40 mEq Oral Once    potassium, sodium phosphates  2 packet Oral Q4H     PRN Meds:acetaminophen, albuterol, dextrose 10%, dextrose 10%, dextrose, dextrose, glucagon (human recombinant), hydrALAZINE, HYDROmorphone, insulin aspart U-100, melatonin, ondansetron, oxyCODONE, prochlorperazine, sodium chloride 0.9%     Review of patient's allergies indicates:   Allergen Reactions    Milk containing products (dairy)      Causes GI distress     Objective:     Vital Signs (Most Recent):  Temp: 98.4 °F (36.9 °C) (06/25/23 0740)  Pulse: 82 (06/25/23 0740)  Resp: 18 (06/25/23 0740)  BP: (!) 158/75 (06/25/23 0740)  SpO2: (!) 92 % (06/25/23 0740) Vital Signs (24h Range):  Temp:  [97.6 °F (36.4 °C)-98.7 °F (37.1 °C)] 98.4 °F (36.9 °C)  Pulse:  [77-82] 82  Resp:  [18-20] 18  SpO2:  [91 %-98 %] 92 %  BP: (105-158)/(55-75) 158/75     Weight: 62.8 kg (138 lb 7.2 oz)  Body mass index is 23.76 kg/m².    Intake/Output - Last 3 Shifts         06/23 0700 06/24 0659 06/24 0700 06/25 0659 06/25 0700 06/26 0659    P.O. 360 1320     IV  Piggyback 1000 100     Total Intake(mL/kg) 1360 (21.7) 1420 (22.6)     Urine (mL/kg/hr) 0 (0) 0 (0)     Drains 50 10     Stool  0     Total Output 50 10     Net +1310 +1410            Urine Occurrence 1 x 5 x     Stool Occurrence  0 x              Physical Exam  Vitals and nursing note reviewed.   Constitutional:       General: She is not in acute distress.     Appearance: Normal appearance.   HENT:      Head: Normocephalic and atraumatic.   Eyes:      Extraocular Movements: Extraocular movements intact.   Cardiovascular:      Rate and Rhythm: Normal rate.   Pulmonary:      Effort: Pulmonary effort is normal. No tachypnea or respiratory distress.   Abdominal:      General: There is no distension.      Palpations: Abdomen is soft.      Tenderness: There is no abdominal tenderness.      Comments: Soft, attp, ND  Incisions clear  Drain benign    Neurological:      General: No focal deficit present.      Mental Status: She is alert and oriented to person, place, and time.      Cranial Nerves: No cranial nerve deficit.      Motor: No weakness.   Psychiatric:         Attention and Perception: Attention normal.         Mood and Affect: Mood and affect normal.         Speech: Speech normal.         Behavior: Behavior is cooperative.        Significant Labs:  I have reviewed all pertinent lab results within the past 24 hours.    Significant Diagnostics:  I have reviewed all pertinent imaging results/findings within the past 24 hours.    Assessment/Plan:     Acute pancreatitis  Mariann Huff is a 63 y.o. female PMH CAD with GINGER , HTN, HLD, DM2, MURTAZA (not on CPAP), PAD, Afib and hx of DVT on Eliquis, biliary pancreatitis admitted to [facility] on 6/9/2023 for biliary pancreatitis. Now s/p ERCP on 6/15/23 with sphincterotomy/biliary tree swept and stent placed in CBD. General Surgery consulted for evaluation for cholecystectomy now s/p laparoscopic subtotal cholecystectomy with drain placement on 6/23/23.    - Advance diet as  tolerated   -Okay to stop abx   -Drain removed at bedside   - Okay for anticoagulation  -Can discharge from surgical perspective if tolerates breakfast without issue  -Will follow-up in clinic  - Please call with questions        Lasha Wahl MD  General Surgery  [facility] - Telemetry Stepdown

## 2023-06-25 NOTE — PLAN OF CARE
[facility] - Telemetry Stepdown      HOME HEALTH ORDERS  FACE TO FACE ENCOUNTER    Patient Name: Mariann Huff  YOB: 1960    PCP: Jasbir Haney MD   PCP Address: [address] [facility] / [place] [address]  PCP Phone Number: [phone]  PCP Fax: [phone]    Encounter Date: 6/9/23    Admit to Home Health    Diagnoses:  Active Hospital Problems    Diagnosis  POA    *Hyperosmolar hyperglycemic state (HHS) [E11.00]  Yes     Priority: 2     History of pancreatitis [Z87.19]  Not Applicable     Priority: 1 - High    Acute pancreatitis [K85.90]  Yes     Priority: 1 - High    Type 2 diabetes mellitus with chronic kidney disease [E11.22]  Yes     Priority: 2     Hypertension associated with diabetes [E11.59, I15.2]  Yes     Priority: 3      Chronic    Hyperlipidemia associated with type 2 diabetes mellitus [E11.69, E78.5]  Yes     Priority: 3      Chronic    ATN (acute tubular necrosis) [N17.0]  Yes     Priority: 4     PAPA (acute kidney injury) [N17.9]  Yes     Priority: 4     Asthma [J45.909]  Yes     Priority: 7      Chronic    History of DVT (deep vein thrombosis) [Z86.718]  Not Applicable     Priority: 10     PAD (peripheral artery disease) [I73.9]  Yes     Priority: 11      Chronic    Hydronephrosis [N13.30]  Yes     Priority: 17     Hypokalemia [E87.6]  Yes     Priority: 23     Hypophosphatemia [E83.39]  Yes     Priority: 24     Hyperkalemia [E87.5]  Yes     Priority: 24     Hyperbilirubinemia [E80.6]  Yes     Priority: 24     Moderate malnutrition [E44.0]  Yes     Priority: 24       Resolved Hospital Problems    Diagnosis Date Resolved POA    Sepsis with acute liver failure without hepatic coma or septic shock [A41.9, R65.20, K72.00] 06/19/2023 No       Follow Up Appointments:  Future Appointments   Date Time Provider Department Center   6/27/2023  7:30 AM Som Purcell OD [facility] OPTOMTY [place]   7/6/2023  9:40 AM Jasbir Haney MD [facility] IM [place]   7/20/2023  9:30 AM LAB, [facility]  [place]   7/27/2023 11:00 AM Adelaide Aguilar MD [facility] CARDIO [place]       Allergies:  Review of patient's allergies indicates:   Allergen Reactions    Milk containing products (dairy)      Causes GI distress       Medications: Review discharge medications with patient and family and provide education.    Current Facility-Administered Medications   Medication Dose Route Frequency Provider Last Rate Last Admin    acetaminophen tablet 650 mg  650 mg Oral Q4H PRN Som Gregory MD        albuterol inhaler 2 puff  2 puff Inhalation Q6H PRN Som Gregory MD        amLODIPine tablet 10 mg  10 mg Oral Daily Zoë Treadwell MD   10 mg at 06/25/23 0823    aspirin chewable tablet 81 mg  81 mg Oral Daily Zoë Treadwell MD   81 mg at 06/25/23 0823    dextrose 10% bolus 125 mL 125 mL  12.5 g Intravenous PRN Jerilyn Harrison MD        dextrose 10% bolus 250 mL 250 mL  25 g Intravenous PRN Jerilyn Harrison MD        dextrose 40 % gel 15,000 mg  15 g Oral PRN Jerilyn Harrison MD        dextrose 40 % gel 30,000 mg  30 g Oral PRN Jerilyn Harrison MD        enoxaparin injection 30 mg  30 mg Subcutaneous Q24H (prophylaxis, 1700) Tessa Odonnell MD        famotidine tablet 20 mg  20 mg Oral Daily Gregory Landers MD   20 mg at 06/25/23 0823    glucagon (human recombinant) injection 1 mg  1 mg Intramuscular PRN Jerilyn Harrison MD        hydrALAZINE tablet 25 mg  25 mg Oral Q8H PRN Zoë Treadwell MD        hydrALAZINE tablet 25 mg  25 mg Oral Q12H Sharon iDal MD   25 mg at 06/25/23 0823    HYDROmorphone injection 0.5 mg  0.5 mg Intravenous Q4H PRN Tessa Odonnell MD   0.5 mg at 06/25/23 0750    insulin aspart U-100 pen 0-5 Units  0-5 Units Subcutaneous QID (AC + HS) PRN Tristan Norris, DO   3 Units at 06/23/23 2123    insulin aspart U-100 pen 5 Units  5 Units Subcutaneous TIDWM Tristan Norris, DO   5 Units at 06/25/23 0824    insulin detemir U-100 (Levemir) pen 14 Units  14 Units Subcutaneous QHS  Tristan Norris, DO   14 Units at 06/24/23 2042    isosorbide dinitrate tablet 10 mg  10 mg Oral BID Sharon Dial MD   10 mg at 06/25/23 0823    melatonin tablet 6 mg  6 mg Oral Nightly PRN Som Gregory MD   6 mg at 06/12/23 2231    ondansetron injection 4 mg  4 mg Intravenous Q6H PRN Som Gregory MD   4 mg at 06/23/23 1356    oxyCODONE immediate release tablet 5 mg  5 mg Oral Q4H PRN Lasha Wahl MD   5 mg at 06/24/23 2015    prochlorperazine injection Soln 5 mg  5 mg Intravenous Q6H PRN Som Gregory MD   5 mg at 06/23/23 1441    sodium chloride 0.9% flush 10 mL  10 mL Intravenous PRN Som Gregory MD         Facility-Administered Medications Ordered in Other Encounters   Medication Dose Route Frequency Provider Last Rate Last Admin    0.9%  NaCl infusion   Intravenous Continuous Aime George  mL/hr at 10/27/22 0842 New Bag at 10/27/22 0842     Current Discharge Medication List        START taking these medications    Details   hydrALAZINE (APRESOLINE) 25 MG tablet Take 1 tablet (25 mg total) by mouth every 12 (twelve) hours.  Qty: 60 tablet, Refills: 2    Comments: .      HYDROcodone-acetaminophen (NORCO) 5-325 mg per tablet Take 1 tablet by mouth every 6 (six) hours as needed for Pain.  Qty: 20 tablet, Refills: 0    Comments: Quantity prescribed more than 7 day supply? No           CONTINUE these medications which have CHANGED    Details   apixaban (ELIQUIS) 2.5 mg Tab Take 1 tablet (2.5 mg total) by mouth 2 (two) times daily.  Qty: 180 tablet, Refills: 1      evolocumab (REPATHA SURECLICK) 140 mg/mL PnIj Inject 1 mL (140 mg total) into the skin every 14 (fourteen) days.  Qty: 2 mL, Refills: 6      insulin detemir U-100, Levemir, (LEVEMIR FLEXPEN) 100 unit/mL (3 mL) InPn pen Inject 14 Units into the skin every evening.  Qty: 12 mL, Refills: 3           CONTINUE these medications which have NOT CHANGED    Details   amLODIPine (NORVASC) 10 MG tablet Take 1 tablet (10 mg  total) by mouth once daily.  Qty: 90 tablet, Refills: 2    Comments: .  Associated Diagnoses: HTN (hypertension), benign      aspirin 81 mg Tab Take 81 mg by mouth once daily. 1 Tablet Oral Every day      atorvastatin (LIPITOR) 40 MG tablet Take 1 tablet (40 mg total) by mouth every evening.  Qty: 90 tablet, Refills: 3      dapagliflozin (FARXIGA) 10 mg tablet Take 1 tablet (10 mg total) by mouth once daily.  Qty: 90 tablet, Refills: 3    Associated Diagnoses: Type 2 diabetes mellitus with diabetic peripheral angiopathy without gangrene, with long-term current use of insulin; Type 2 diabetes mellitus with retinopathy, with long-term current use of insulin, macular edema presence unspecified, unspecified laterality, unspecified retinopathy severity; Type 2 diabetes mellitus with stage 2 chronic kidney disease and hypertension      diclofenac sodium (VOLTAREN) 1 % Gel Apply 2 g topically 3 (three) times daily. Apply to the area of pain 2-3x per day or night as needed  Qty: 100 g, Refills: 3      EScitalopram oxalate (LEXAPRO) 10 MG tablet Take 1 tablet (10 mg total) by mouth once daily.  Qty: 90 tablet, Refills: 2      gabapentin (NEURONTIN) 300 MG capsule Take 1 capsule (300 mg) in the morning and 2 capsules (600 mg) in the evening  Qty: 90 capsule, Refills: 0      glipiZIDE (GLUCOTROL) 10 MG TR24 Take 1 tablet (10 mg total) by mouth daily with breakfast.  Qty: 90 tablet, Refills: 3      isosorbide mononitrate (ISMO,MONOKET) 10 mg tablet Take 1 tablet (10 mg total) by mouth once daily.  Qty: 90 tablet, Refills: 2    Associated Diagnoses: HTN (hypertension), benign      ACCU-CHEK EDIN PLUS METER Misc       albuterol (PROVENTIL/VENTOLIN HFA) 90 mcg/actuation inhaler Inhale 2 puffs into the lungs every 6 (six) hours as needed for Wheezing.  Qty: 1 g, Refills: 3      blood sugar diagnostic (ACCU-CHEK EDIN PLUS TEST STRP) Strp TEST BLOOD SUGARS 4 TIMES DAILY  Qty: 150 strip, Refills: 11    Associated Diagnoses: Type II  "or unspecified type diabetes mellitus with peripheral circulatory disorders, uncontrolled(250.72)      cilostazoL (PLETAL) 100 MG Tab Take 1 tablet (100 mg total) by mouth 2 (two) times daily.  Qty: 60 tablet, Refills: 11      multivitamin (THERAGRAN) per tablet Take 1 tablet by mouth once daily.      pen needle, diabetic (NOVOTWIST) 32 gauge x 1/5" Ndle Use 1 needle to inject insulin four times daily  Qty: 400 each, Refills: 2      pen needle, diabetic 32 gauge x 5/32" Ndle Use as directed  Qty: 100 each, Refills: 0           STOP taking these medications       eszopiclone (LUNESTA) 2 MG Tab Comments:   Reason for Stopping:         telmisartan (MICARDIS) 80 MG Tab Comments:   Reason for Stopping:                 I have seen and examined this patient within the last 30 days. My clinical findings that support the need for the home health skilled services and home bound status are the following:no   Requiring assistive device to leave home due to unsteady gait caused by  Surgery.  Patient with medication mismanagement issues requiring home bound status as evidenced by  Poor understanding of medication regimen/dosage, Poor adherence to medication regimen/dosage, and Uncontrolled hyperglycemic/hypoglycemic events.     Diet:   diabetic diet 2000 calorie    Labs:  Report Lab results to PCP.    Referrals/ Consults  Physical Therapy to evaluate and treat. Evaluate for home safety and equipment needs; Establish/upgrade home exercise program. Perform / instruct on therapeutic exercises, gait training, transfer training, and Range of Motion.  Occupational Therapy to evaluate and treat. Evaluate home environment for safety and equipment needs. Perform/Instruct on transfers, ADL training, ROM, and therapeutic exercises.    Activities:   activity as tolerated and no lifting     Nursing:   Agency to admit patient within 24 hours of hospital discharge unless specified on physician order or at patient request    SN to complete " comprehensive assessment including routine vital signs. Instruct on disease process and s/s of complications to report to MD. Review/verify medication list sent home with the patient at time of discharge  and instruct patient/caregiver as needed. Frequency may be adjusted depending on start of care date.     Skilled nurse to perform up to 3 visits PRN for symptoms related to diagnosis    Notify MD if SBP > 160 or < 90; DBP > 90 or < 50; HR > 120 or < 50; Temp > 101; O2 < 88%    Ok to schedule additional visits based on staff availability and patient request on consecutive days within the home health episode.    When multiple disciplines ordered:    Start of Care occurs on Sunday - Wednesday schedule remaining discipline evaluations as ordered on separate consecutive days following the start of care.    Thursday SOC -schedule subsequent evaluations Friday and Monday the following week.     Friday - Saturday SOC - schedule subsequent discipline evaluations on consecutive days starting Monday of the following week.    For all post-discharge communication and subsequent orders please contact patient's primary care physician. If unable to reach primary care physician or do not receive response within 30 minutes, please contact [facility] On [facility] RN for clinical staff order clarification    Miscellaneous   Diabetic Care:   SN to perform and educate Diabetic management with blood glucose monitoring:  Fingerstick blood sugar AC and HS, and Report CBG < 70 or > 280 to physician.    Home Health Aide:  Nursing Three times weekly, Physical Therapy Twice weekly, and Occupational Therapy Twice weekly    Wound Care Orders  Surgical Wound:  Location: abdomen  Keep C/D/I    I certify that this patient is confined to her home and needs intermittent skilled nursing care, physical therapy, and occupational therapy.    Tessa Odonnell MD

## 2023-06-25 NOTE — NURSING
Received report from GOPAL Grijalva. Assumed care of pt.     2028 Pt medicated per MAR order. No acute distress noted. Pt alert and oriented x4. All lines/tubes traced and secured. Will continue to monitor pt status.    0346 pt medicated per MAR order. No acute distress noted.     EOSS No acute distress noted. Pt alert and oriented x4. All lines/tubes traced and secured. Significant other at the bedside. No falls or injuries noted on this shift.

## 2023-06-25 NOTE — ASSESSMENT & PLAN NOTE
- HHS on admission. Recent pancreatitis admission, not yet back to normal po intake with worsening hepatic function. Taking Glipizide and Farxiga at home. Never picked up Levemir Rx recently prescribed by her PCP    - 24 hour glucose trend: 110-220s    -  Continue Levemir 14 units nightly (adjusted overnight)  -  Decreased Novolog to 5 units before meals + continue low dose correction Novolog prn     - Hypoglycemia protocol   - If blood glucose greater than 300, please ask patient not to eat food or drink anything other than water until correctional insulin has brought it back below 250    **Please notify endocrine of change in diet or plans for NPO for surgery (or any other reason) as this may change her insulin requirement**      **DC Recommendations:**    -- Discontinue Trulicity and remove from medication list    -- Continue Levemir 14 units nightly  -- Continue home Glipizide XR 10 mg once daily with breakfast  -- Continue Farxiga 10 mg once daily as long as patient is tolerating oral intake without nausea or vomiting.  Hold if symptoms of N/V.  If resumed and symptoms of nasuea or poor PO intake occur she should hold medicine until she feels better.    -- She should monitor BG closely before every meal and at bedtime and bring glucose logs to her office visits  -- She needs to be seen soon by her PCP for further medication adjustment.  If blood glucose frequently less than 90 or above 180 she needs to reach out to her PCP office for recommendations.

## 2023-06-25 NOTE — ASSESSMENT & PLAN NOTE
- She has now had two episodes of pancreatitis on Trulicity, however dx of biliary pancreatitis per primary and GI based on eval this admission   - Episodes of pancreatitis likely multifactorial with GLP1 as contributing factor  - Laparoscopic cholecystectomy completed on Friday 6/23   - Hold [facility] at discharge

## 2023-06-25 NOTE — ASSESSMENT & PLAN NOTE
Patient with acute kidney injury likely due to acute tubular necrosis PAPA is currently improving. Labs reviewed- Renal function/electrolytes with Estimated Creatinine Clearance: 42 mL/min (based on SCr of 1.2 mg/dL). according to latest data. Monitor urine output and serial BMP and adjust therapy as needed. Avoid nephrotoxins and renally dose meds for GFR listed above.

## 2023-06-25 NOTE — ASSESSMENT & PLAN NOTE
Mariann Huff is a 63 y.o. female PMH CAD with GINGER , HTN, HLD, DM2, MURTAZA (not on CPAP), PAD, Afib and hx of DVT on Eliquis, biliary pancreatitis admitted to [facility] on 6/9/2023 for biliary pancreatitis. Now s/p ERCP on 6/15/23 with sphincterotomy/biliary tree swept and stent placed in CBD. General Surgery consulted for evaluation for cholecystectomy now s/p laparoscopic subtotal cholecystectomy with drain placement on 6/23/23.    - Advance diet as tolerated   -Okay to stop abx   -Drain removed at bedside   - Okay for anticoagulation  -Can discharge from surgical perspective if tolerates breakfast without issue  -Will follow-up in clinic  - Please call with questions

## 2023-06-26 ENCOUNTER — PATIENT OUTREACH (OUTPATIENT)
Dept: ADMINISTRATIVE | Facility: CLINIC | Age: 63
End: 2023-06-26
Payer: COMMERCIAL

## 2023-06-26 ENCOUNTER — TELEPHONE (OUTPATIENT)
Dept: SURGERY | Facility: CLINIC | Age: 63
End: 2023-06-26
Payer: COMMERCIAL

## 2023-06-26 DIAGNOSIS — E78.5 HYPERLIPIDEMIA ASSOCIATED WITH TYPE 2 DIABETES MELLITUS: Primary | ICD-10-CM

## 2023-06-26 DIAGNOSIS — E11.69 HYPERLIPIDEMIA ASSOCIATED WITH TYPE 2 DIABETES MELLITUS: Primary | ICD-10-CM

## 2023-06-26 PROCEDURE — G0180 PR HOME HEALTH MD CERTIFICATION: ICD-10-PCS | Mod: ,,, | Performed by: INTERNAL MEDICINE

## 2023-06-26 PROCEDURE — G0180 MD CERTIFICATION HHA PATIENT: HCPCS | Mod: ,,, | Performed by: INTERNAL MEDICINE

## 2023-06-26 NOTE — TELEPHONE ENCOUNTER
Outgoing call to patient for Repatha refill. Patient has not resumed Repatha therapy yet but also has not been told to discontinue permanently. RX was discontinued by hospital provider. Refill request sent.

## 2023-06-26 NOTE — TELEPHONE ENCOUNTER
The original prescription was reordered on 6/25/2023 by Tessa Odonnell MD. Renewing this prescription may not be appropriate.     Possible duplicate: Hover to review recent actions on this medication       Please see the attached refill request.

## 2023-06-26 NOTE — PHYSICIAN QUERY
PT Name: Mariann Huff  MR #: [id number]     DOCUMENTATION CLARIFICATION     CDS/: Severo Gloria RN, CCDS              Contact information:   [email or web address]     This form is a permanent document in the medical record.     Query Date: June 26, 2023    By submitting this query, we are merely seeking further clarification of documentation.  Please utilize your independent clinical judgment when addressing the question(s) below.  The Medical Record contains the following:  Indicators Supporting Clinical Findings Location in Medical Record   x HR         RR          BP        Temp T: 98.7 °F ; Pulse:  [] 91;  Resp:  [18-20] 18;  BP: (140-176)/() 174/101    T: 97.9 °F; Pulse:  [] 61;   Resp:  [18-20] 18; BP: (121-187)/() 122/66    T:  98.9 °F;   Pulse:  [58-69] 66;  Resp:  [17-20] 18;  BP: ()/(51-84) 189/84    T:97.6 °F;  Pulse:  [] 96;  Resp:  [15-27] 15;  BP: (102-186)/(55-85) 128/62       6/9 H&P, HM    6/10 HM    6/11 HM    6/13 HM   x Lactic Acid          Procalcitonin 6/9  lactate: 1.6   Labs     x WBC           Bands          CRP  6/9 WBC: 9.28  6/10  WBC: 9.59  6/13  WBC: 19.85  6/14  WBC: 21.37  6/15  WBC: 11.89 Labs     Culture(s)     x AMS, Confusion, LOC, etc. Acute encephalopathy--Perhaps related to sepsis.   6/14 HM   x Organ Dysfunction/Failure Organ dysfunction indicated by Acute liver injury   6/13 HM   x Bacteremia or Sepsis / Septic Sepsis with acute liver failure without hepatic coma or septic shock;  Source: Abdominal and potentially bacteremia  Antibiotics given- metronidazole IVPB;  cefTRIAXone, IV    Problems Resolved During this Admission:   Sepsis with acute liver failure without hepatic coma or septic shock   --General Surgery consulted for evaluation for cholecystectomy now s/p laparoscopic subtotal cholecystectomy with drain placement on 6/23/23.     6/13  [facility] ()        6/25 Discharge summary    x Known or Suspected Source  of Infection documented Source: Abdominal and potentially bacteremia 6/13-18 HM    (Failed) Outpatient Treatment     x Medication NS, bolus, 1000 ml;  6/9, 6/10  LR, bolus,  1000 ml;  6/12, 6/14  LR, bolus, 500 ml; 6/14  NS, bolus, 500 ml;  6/15  Vanco,IV; 6/15, 6/16  Ceftriaxone, IV; 6/14  Metronidazole, IV; 6/14  Metronidazole, PO; 6/19-20  Cipro, PO; 6/18-20  Zosyn, IV; 6/15,  6/17,  6/21,  6/25  Cefazolin, IV; 6/23-- intra op          MAR    Treatment     x Other  ATN (acute tubular necrosis) present on admission.  06/11, patient developed new hyperbilirubinemia and worsening liver enzymes and given history of pancreatitis concern for possible choledocholithiasis.                        6/18 6/19 6/20  ALKPHOS    1,008 [id number]  ALT          117 88 83  AST          123 80 81  ALBUMIN         1.5 1.6 1.9       6/20 Virtual         Provider, conflicting documentation, please specify diagnosis or diagnoses associated with above clinical findings.    [   ] Sepsis due to unknown organism- please specify source of infection: _____     [   ] Severe Sepsis with Acute Organ Dysfunction/Failure : Acute liver injury  Please specify the source of infection: _________________     [   ] SIRS without infectious etiology     [  X  ] Other Infectious Disease (please specify): __Cholangitis________     [   ] Sepsis Ruled Out           Please document in your progress notes daily for the duration of treatment until resolved and include in your discharge summary.

## 2023-06-27 ENCOUNTER — TELEPHONE (OUTPATIENT)
Dept: DIABETES | Facility: CLINIC | Age: 63
End: 2023-06-27
Payer: COMMERCIAL

## 2023-06-28 LAB
FINAL PATHOLOGIC DIAGNOSIS: NORMAL
GROSS: NORMAL
Lab: NORMAL

## 2023-06-28 RX ORDER — EVOLOCUMAB 140 MG/ML
140 INJECTION, SOLUTION SUBCUTANEOUS
Qty: 2 ML | Refills: 6 | OUTPATIENT
Start: 2023-06-28 | End: 2023-07-26

## 2023-06-30 ENCOUNTER — HOSPITAL ENCOUNTER (EMERGENCY)
Facility: HOSPITAL | Age: 63
Discharge: HOME | End: 2023-06-30
Attending: FAMILY MEDICINE
Payer: COMMERCIAL

## 2023-06-30 VITALS
SYSTOLIC BLOOD PRESSURE: 150 MMHG | BODY MASS INDEX: 24.03 KG/M2 | WEIGHT: 140 LBS | DIASTOLIC BLOOD PRESSURE: 71 MMHG | TEMPERATURE: 98 F | OXYGEN SATURATION: 100 % | HEART RATE: 81 BPM | RESPIRATION RATE: 16 BRPM

## 2023-06-30 DIAGNOSIS — S42.202A CLOSED FRACTURE OF PROXIMAL END OF LEFT HUMERUS, UNSPECIFIED FRACTURE MORPHOLOGY, INITIAL ENCOUNTER: Primary | ICD-10-CM
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
## 2023-06-30 LAB
ALBUMIN SERPL BCP-MCNC: 3.7 G/DL (ref 3.5–5.2)
ALP SERPL-CCNC: 975 U/L (ref 38–126)
ALT SERPL W/O P-5'-P-CCNC: 51 U/L (ref 10–44)
ANION GAP SERPL CALC-SCNC: 13 MMOL/L (ref 8–16)
APTT PPP: <21 SEC (ref 21–32)
AST SERPL-CCNC: 76 U/L (ref 15–46)
BASOPHILS # BLD AUTO: 0.05 K/UL (ref 0–0.2)
BASOPHILS NFR BLD: 0.3 % (ref 0–1.9)
BILIRUB SERPL-MCNC: 1.4 MG/DL (ref 0.1–1)
CALCIUM SERPL-MCNC: 9.2 MG/DL (ref 8.7–10.5)
CHLORIDE SERPL-SCNC: 101 MMOL/L (ref 95–110)
CO2 SERPL-SCNC: 28 MMOL/L (ref 23–29)
CREAT SERPL-MCNC: 1.13 MG/DL (ref 0.5–1.4)
DIFFERENTIAL METHOD: ABNORMAL
EOSINOPHIL # BLD AUTO: 0.2 K/UL (ref 0–0.5)
EOSINOPHIL NFR BLD: 1.4 % (ref 0–8)
ERYTHROCYTE [DISTWIDTH] IN BLOOD BY AUTOMATED COUNT: 16.2 % (ref 11.5–14.5)
EST. GFR  (NO RACE VARIABLE): 55 ML/MIN/1.73 M^2
GLUCOSE SERPL-MCNC: 155 MG/DL (ref 70–110)
HCT VFR BLD AUTO: 27.4 % (ref 37–48.5)
HGB BLD-MCNC: 8.6 G/DL (ref 12–16)
IMM GRANULOCYTES # BLD AUTO: 0.09 K/UL (ref 0–0.04)
IMM GRANULOCYTES NFR BLD AUTO: 0.6 % (ref 0–0.5)
INR PPP: 1 (ref 0.8–1.2)
LIPASE SERPL-CCNC: 388 U/L (ref 23–300)
LYMPHOCYTES # BLD AUTO: 2.1 K/UL (ref 1–4.8)
LYMPHOCYTES NFR BLD: 13.9 % (ref 18–48)
MCH RBC QN AUTO: 27 PG (ref 27–31)
MCHC RBC AUTO-ENTMCNC: 31.4 G/DL (ref 32–36)
MCV RBC AUTO: 86 FL (ref 82–98)
MONOCYTES # BLD AUTO: 1 K/UL (ref 0.3–1)
MONOCYTES NFR BLD: 6.5 % (ref 4–15)
NEUTROPHILS # BLD AUTO: 11.8 K/UL (ref 1.8–7.7)
NEUTROPHILS NFR BLD: 77.3 % (ref 38–73)
NRBC BLD-RTO: 0 /100 WBC
PLATELET # BLD AUTO: 610 K/UL (ref 150–450)
PMV BLD AUTO: 10.7 FL (ref 9.2–12.9)
POTASSIUM SERPL-SCNC: 4.6 MMOL/L (ref 3.5–5.1)
PROT SERPL-MCNC: 8.6 G/DL (ref 6–8.4)
PROTHROMBIN TIME: 10.9 SEC (ref 9–12.5)
RBC # BLD AUTO: 3.19 M/UL (ref 4–5.4)
SODIUM SERPL-SCNC: 142 MMOL/L (ref 136–145)
UUN UR-MCNC: 13 MG/DL (ref 7–17)
WBC # BLD AUTO: 15.27 K/UL (ref 3.9–12.7)

## 2023-06-30 PROCEDURE — 99152 MOD SED SAME PHYS/QHP 5/>YRS: CPT | Mod: ER

## 2023-06-30 PROCEDURE — 99285 EMERGENCY DEPT VISIT HI MDM: CPT | Mod: 25,ER

## 2023-06-30 PROCEDURE — 96375 TX/PRO/DX INJ NEW DRUG ADDON: CPT | Mod: 59,ER

## 2023-06-30 PROCEDURE — 96361 HYDRATE IV INFUSION ADD-ON: CPT | Mod: 59,ER

## 2023-06-30 PROCEDURE — 80053 COMPREHEN METABOLIC PANEL: CPT | Mod: ER | Performed by: FAMILY MEDICINE

## 2023-06-30 PROCEDURE — 63600175 PHARM REV CODE 636 W HCPCS: Mod: ER | Performed by: FAMILY MEDICINE

## 2023-06-30 PROCEDURE — 87040 BLOOD CULTURE FOR BACTERIA: CPT | Mod: 59,ER | Performed by: FAMILY MEDICINE

## 2023-06-30 PROCEDURE — 25000003 PHARM REV CODE 250: Mod: ER | Performed by: FAMILY MEDICINE

## 2023-06-30 PROCEDURE — 27000221 HC OXYGEN, UP TO 24 HOURS: Mod: ER

## 2023-06-30 PROCEDURE — 85610 PROTHROMBIN TIME: CPT | Mod: ER | Performed by: FAMILY MEDICINE

## 2023-06-30 PROCEDURE — 99900035 HC TECH TIME PER 15 MIN (STAT): Mod: ER

## 2023-06-30 PROCEDURE — 23605 CLTX PRX HMRL FX MNPJ+-TRACT: CPT | Mod: LT,ER

## 2023-06-30 PROCEDURE — 83690 ASSAY OF LIPASE: CPT | Mod: ER | Performed by: FAMILY MEDICINE

## 2023-06-30 PROCEDURE — 96374 THER/PROPH/DIAG INJ IV PUSH: CPT | Mod: ER

## 2023-06-30 PROCEDURE — 85730 THROMBOPLASTIN TIME PARTIAL: CPT | Mod: ER | Performed by: FAMILY MEDICINE

## 2023-06-30 PROCEDURE — 85025 COMPLETE CBC W/AUTO DIFF WBC: CPT | Mod: ER | Performed by: FAMILY MEDICINE

## 2023-06-30 RX ORDER — KETOROLAC TROMETHAMINE 30 MG/ML
15 INJECTION, SOLUTION INTRAMUSCULAR; INTRAVENOUS
Status: COMPLETED | OUTPATIENT
Start: 2023-06-30 | End: 2023-06-30

## 2023-06-30 RX ORDER — ONDANSETRON 2 MG/ML
4 INJECTION INTRAMUSCULAR; INTRAVENOUS
Status: COMPLETED | OUTPATIENT
Start: 2023-06-30 | End: 2023-06-30

## 2023-06-30 RX ORDER — FENTANYL CITRATE 50 UG/ML
100 INJECTION, SOLUTION INTRAMUSCULAR; INTRAVENOUS
Status: COMPLETED | OUTPATIENT
Start: 2023-06-30 | End: 2023-06-30

## 2023-06-30 RX ORDER — MIDAZOLAM HYDROCHLORIDE 1 MG/ML
2 INJECTION, SOLUTION INTRAMUSCULAR; INTRAVENOUS
Status: DISCONTINUED | OUTPATIENT
Start: 2023-06-30 | End: 2023-06-30 | Stop reason: HOSPADM

## 2023-06-30 RX ORDER — HYDROCODONE BITARTRATE AND ACETAMINOPHEN 5; 325 MG/1; MG/1
1 TABLET ORAL EVERY 6 HOURS PRN
Qty: 12 TABLET | Refills: 0 | Status: SHIPPED | OUTPATIENT
Start: 2023-06-30 | End: 2023-07-03 | Stop reason: ALTCHOICE

## 2023-06-30 RX ADMIN — KETOROLAC TROMETHAMINE 15 MG: 30 INJECTION, SOLUTION INTRAMUSCULAR; INTRAVENOUS at 04:06

## 2023-06-30 RX ADMIN — FENTANYL CITRATE 100 MCG: 50 INJECTION INTRAMUSCULAR; INTRAVENOUS at 05:06

## 2023-06-30 RX ADMIN — ONDANSETRON 4 MG: 2 INJECTION INTRAMUSCULAR; INTRAVENOUS at 05:06

## 2023-06-30 RX ADMIN — SODIUM CHLORIDE 1000 ML: 9 INJECTION, SOLUTION INTRAVENOUS at 05:06

## 2023-06-30 NOTE — ED PROVIDER NOTES
Encounter Date: 6/30/2023       History     Chief Complaint   Patient presents with    Shoulder Pain     Reports fell getting out of the shower. Denies hitting head. +blood thinners  states patient keeps experiencing seizure like activity. States had 3 episodes where has a left sided gaze and not responding since fall. Had gall bladder removed on 6/24     63-year-old female slipped and fell in bathroom and denies head injury.  Complains of left shoulder and arm pain since then.  As per  she is been having shakes in her body as though if she is having seizure.  No history of seizures.  Patient is awake and alert on arrival to ED .  She is drowsy and sleepy.  History of diabetes, hypertension, hyperlipidemia, coronary artery disease with PTCA.  Chronic back pain, asthma.  Patient had cholecystectomy 1 week ago.  She is on anticoagulation.  With Eliquis.    The history is provided by the spouse.     Review of patient's allergies indicates:   Allergen Reactions    Milk containing products (dairy)      Causes GI distress     Past Medical History:   Diagnosis Date    Anticoagulant long-term use     Asthma     Back pain     Bradycardia, unspecified 5/8/2019    The etiology of the bradycardic episode is unclear.  The have appear to be respiratory in origin (at least the 1st episode).  We will continue to monitor carefully.  We are awaiting evaluation by Cardiology.      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, with long-term current use of insulin 2020     Past Surgical History:   Procedure Laterality Date    ANTEGRADE NEPHROSTOGRAPHY Left 2019    Procedure: Nephrostogram - antegrade;  Surgeon: Robin Boyd MD;  [facility];  Service: Urology;  Laterality: Left;    BRONCHOSCOPY N/A 2019    Procedure: BRONCHOSCOPY;  Surgeon: Sean Ruano MD;  Location: [facility] OR [address] [place] [address];  Service: General;  Laterality: N/A;    CARDIAC CATHETERIZATION      CATARACT EXTRACTION      cataract extraction left eye      cataracts      CAUDAL EPIDURAL STEROID INJECTION N/A 2019    Procedure: Injection-steroid-epidural-caudal;  Surgeon: Dave Bentley Jr., MD;  Location: [facility] PAIN MGT;  Service: Pain Management;  Laterality: N/A;     SECTION, LOW TRANSVERSE      COLONOSCOPY N/A 2019    Procedure: COLONOSCOPY;  Surgeon: Al Alaniz MD;  [facility]  Service: Endoscopy;  Laterality: N/A;    CORONARY ANGIOPLASTY      CYSTOGRAM N/A 2019    Procedure: CYSTOGRAM INTRAOP;  Surgeon: Robin Boyd MD;  [facility];  Service: Urology;  Laterality: N/A;  1 HOUR    CYSTOSCOPY W/ RETROGRADES Left 2019    Procedure: CYSTOSCOPY, WITH RETROGRADE PYELOGRAM;  Surgeon: Robin Boyd MD;  Location: [facility] OR [address] [place] [address];  Service: Urology;  Laterality: Left;  fluro    ENDOSCOPIC ULTRASOUND OF UPPER GASTROINTESTINAL TRACT N/A 6/15/2023    Procedure: ULTRASOUND, UPPER GI TRACT, ENDOSCOPIC;  Surgeon: Lisa Kim MD;  [facility]  Service: Endoscopy;  Laterality: N/A;    ERCP N/A 6/15/2023    Procedure: ERCP (ENDOSCOPIC RETROGRADE CHOLANGIOPANCREATOGRAPHY);  Surgeon: Lisa Kim MD;  [facility]  Service: Endoscopy;  Laterality: N/A;    ESOPHAGOGASTRODUODENOSCOPY W/ PEG  2019    Procedure: EGD, WITH PEG TUBE INSERTION;  Surgeon: Sean Ruano MD;   Location: [facility] OR 2ND FLR;  Service: General;;    EXCISION TURBINATE, SUBMUCOUS      FLEXIBLE SIGMOIDOSCOPY N/A 5/13/2019    Procedure: SIGMOIDOSCOPY, FLEXIBLE;  Surgeon: ALBERTO Amin MD;  Location: [facility] ENDO (2ND FLR);  Service: Endoscopy;  Laterality: N/A;    FLEXIBLE SIGMOIDOSCOPY N/A 5/21/2019    Procedure: SIGMOIDOSCOPY, FLEXIBLE;  Surgeon: ALBERTO Amin MD;  Location: [facility] ENDO (2ND FLR);  Service: Endoscopy;  Laterality: N/A;    FUSION OF LUMBAR SPINE BY ANTERIOR APPROACH Left 4/12/2019    Procedure: FUSION, SPINE, LUMBAR, ANTERIOR APPROACH Left L5-S1 Anterior to Psoa Interbody Fusion, L5-S1 Posterior Instrumentation;  Surgeon: Mk George MD;  [address]  Service: Neurosurgery;  Laterality: Left;  Porcedure:  Left L5-S1 Anterior to Psoa Interbody Fusion, L5-S1 Posterior Instrumentation  Surgery Time: 4 Hrs  LOS: 2-3  Anesthesia: General   Blood: Type & Screen  R    HAND SURGERY Left     HAND SURGERY Right     torn ligament repair/ Dr. Yeboah    HYSTERECTOMY      INJECTION OF STEROID Right 12/10/2020    Procedure: INJECTION, STEROID Right SI Joint Block and Steroid Injection;  Surgeon: Mk George MD;  Location: [facility] OR;  Service: Neurosurgery;  Laterality: Right;  Procedure: Right SI Joint Block and Steroid Injection   SUrgery Time: 30 Min  LOS: 0  Anesthesia: MAC  Radiology: C-arm  Bed: [place] [id number] Poster  Position: Prone    INJECTION OF STEROID Right 9/28/2021    Procedure: INJECTION, STEROID Right SI joint block & steroid injection;  Surgeon: Mk George MD;  Location: [facility] OR;  Service: Neurosurgery;  Laterality: Right;  Procedure: Right SI joint block & steroid injection  Surgery Time: 30m  Anesthesia: Local MAC  Radiology: C-arm  Bed: Regular  Position: Prone    LAPAROSCOPIC CHOLECYSTECTOMY N/A 6/23/2023    Procedure: CHOLECYSTECTOMY, LAPAROSCOPIC;  Surgeon: Richard Penny MD;  [address];  Service: General;  Laterality: N/A;    left foot surgery       left wrist surgery      LYSIS OF ADHESIONS N/A 2/19/2020    Procedure: LYSIS, ADHESIONS;  Surgeon: Robin Boyd MD;  [facility];  Service: Urology;  Laterality: N/A;    NASAL SEPTUM SURGERY  5/7/15    PERCUTANEOUS NEPHROSTOMY Left 4/21/2019    Procedure: Creation, Nephrostomy, Percutaneous;  Surgeon: Karina Surgeon;  Location: [facility];  Service: Anesthesiology;  Laterality: Left;    REPAIR OF URETER  4/12/2019    Procedure: REPAIR, URETER;  Surgeon: Mk George MD;  [facility];  Service: Neurosurgery;;    REPLACEMENT OF NEPHROSTOMY TUBE N/A 7/18/2019    Procedure: REPLACEMENT, NEPHROSTOMY TUBE;  Surgeon: [facility] Diagnostic Provider;  [address]  Service: Anesthesiology;  Laterality: N/A;  188    REPLACEMENT OF NEPHROSTOMY TUBE N/A 7/24/2019    Procedure: REPLACEMENT, NEPHROSTOMY TUBE;  Surgeon: [facility] Diagnostic Provider;  [facility];  Service: Anesthesiology;  Laterality: N/A;  188    REPLACEMENT OF NEPHROSTOMY TUBE N/A 10/7/2019    Procedure: REPLACEMENT, NEPHROSTOMY TUBE;  Surgeon: [facility] Diagnostic Provider;  [facility];  Service: Anesthesiology;  Laterality: N/A;  189    REPLACEMENT OF NEPHROSTOMY TUBE N/A 11/25/2019    Procedure: REPLACEMENT, NEPHROSTOMY TUBE;  Surgeon: [facility] Diagnostic Provider;  [facility];  Service: Anesthesiology;  Laterality: N/A;  Room 188/Bessy    REPLACEMENT OF NEPHROSTOMY TUBE Right 2/19/2020    Procedure: REPLACEMENT, NEPHROSTOMY TUBE removal removal;  Surgeon: Robin Boyd MD;  Location: [address];  Service: Urology;  Laterality: Right;    rt elbow surgery      S/P LAD COATED STENT  05/14/2010    6 total     S/P OM1 STENT  08/2003    SINUS SURGERY      F.E.S.S.    TRACHEOSTOMY N/A 5/2/2019    Procedure: CREATION, TRACHEOSTOMY;  Surgeon: Sean Ruano MD;  Location: [address];  Service: General;  Laterality: N/A;    TUBAL LIGATION      URETERAL REIMPLANTION Left 2/19/2020     Procedure: REIMPLANTATION, URETER WITH PSOAS HITCH;  Surgeon: Robin Boyd MD;  Location: [facility] OR [address] [place] [address];  Service: Urology;  Laterality: Left;     Family History   Problem Relation Age of Onset    Diabetes Mother     Heart disease Mother     Diabetes Father     Leukemia Father         leukemia    Heart attack Father     Diabetes Sister     Diabetes Brother     Diabetes Sister     No Known Problems Sister     No Known Problems Brother     No Known Problems Brother     No Known Problems Maternal Grandmother     No Known Problems Maternal Grandfather     No Known Problems Paternal Grandmother     No Known Problems Paternal Grandfather     No Known Problems Son     No Known Problems Son     No Known Problems Maternal Aunt     No Known Problems Maternal Uncle     No Known Problems Paternal Aunt     No Known Problems Paternal Uncle     Colon cancer Neg Hx     Inflammatory bowel disease Neg Hx     Melanoma Neg Hx     Psoriasis Neg Hx     Lupus Neg Hx     Eczema Neg Hx     Acne Neg Hx     Amblyopia Neg Hx     Blindness Neg Hx     Cancer Neg Hx     Cataracts Neg Hx     Glaucoma Neg Hx     Hypertension Neg Hx     Macular degeneration Neg Hx     Retinal detachment Neg Hx     Strabismus Neg Hx     Stroke Neg Hx     Thyroid disease Neg Hx     Heart failure Neg Hx     Hyperlipidemia Neg Hx      Social History     Tobacco Use    Smoking status: Never    Smokeless tobacco: Never   Substance Use Topics    Alcohol use: No     Alcohol/week: 0.0 standard drinks    Drug use: No     Review of Systems   Constitutional:  Negative for fever.   HENT:  Negative for sore throat.    Respiratory:  Negative for shortness of breath.    Cardiovascular:  Negative for chest pain.   Gastrointestinal:  Negative for nausea.   Genitourinary:  Negative for dysuria.   Musculoskeletal:  Negative for back pain.   Skin:  Negative for rash.   Neurological:  Positive for tremors and seizures.   Hematological:  Does not bruise/bleed easily.   All other  systems reviewed and are negative.      Physical Exam     Initial Vitals [06/30/23 1538]   BP Pulse Resp Temp SpO2   (!) 140/65 76 20 98.2 °F (36.8 °C) 100 %      MAP       --         Physical Exam    Nursing note and vitals reviewed.  Constitutional: Vital signs are normal. She appears well-developed and well-nourished. She is active. No distress.   HENT:   Head: Normocephalic.   Nose: Nose normal.   Mouth/Throat: Oropharynx is clear and moist and mucous membranes are normal.   Eyes: Conjunctivae, EOM and lids are normal.   Neck: Neck supple.   Normal range of motion.  Cardiovascular:  Normal rate, regular rhythm, S1 normal, S2 normal and normal heart sounds.           Pulmonary/Chest: Breath sounds normal. No respiratory distress. She has no wheezes. She has no rhonchi. She has no rales. She exhibits no tenderness.   Abdominal: Abdomen is soft. Bowel sounds are normal. She exhibits no distension. There is no abdominal tenderness. There is no rebound and no guarding.   Musculoskeletal:      Left shoulder: Swelling, deformity, tenderness, bony tenderness and crepitus present. Decreased range of motion. Normal strength. Normal pulse.      Right upper arm: Normal.      Left upper arm: Normal.      Cervical back: Normal range of motion and neck supple.      Right lower leg: Normal.      Left lower leg: Normal.     Neurological: She is alert and oriented to person, place, and time. She has normal strength. GCS score is 15. GCS eye subscore is 4. GCS verbal subscore is 5. GCS motor subscore is 6.   Skin: Skin is warm. Capillary refill takes less than 2 seconds.   Psychiatric: She has a normal mood and affect. Her speech is normal and behavior is normal. Thought content normal. Cognition and memory are normal.         ED Course   Orthopedic Injury    Date/Time: 6/30/2023 5:51 PM    Performed by: Felix Perez MD  Authorized by: Felix Perez MD    Location procedure was performed:  [facility] EMERGENCY  DEPARTMENT  Pre-operative diagnosis:  Left humerus displaced fracture  Post-operative diagnosis:  Postreduction  Consent Done?:  Yes  Universal Protocol:     Verbal consent obtained?: Yes      Consent given by:  Patient    Patient states understanding of procedure being performed: Yes      Patient's understanding of procedure matches consent: Yes      Procedure consent matches procedure scheduled: Yes      Relevant documents present and verified: Yes      Test results available and properly labeled: Yes      Site marked: Yes      Imaging studies available: Yes      Patient identity confirmed:  Verbally with patient and name    Time Out: Immediately prior to the procedure a time out was called    Injury:     Injury location:  Upper arm    Injury type:  Fracture-dislocation      Pre-procedure assessment:     Neurovascular status: Neurovascularly intact      Distal perfusion: normal      Neurological function: normal      Range of motion: normal      Local anesthesia used?: No      Patient sedated?: Yes      ASA Class:  Class 2 - Mild Illness without functional impairment.    Mallampati Score:  Class 1 - Visualization of the soft palate, fauces, uvula, and anterior/posterior pillars.  Date/Time of last solid:  6/29/2023 7:00 PM    Contents of Last Food Intake:  Chinese food    Date/Time of last fluid:  6/29/2023 7:00 PM    Contents of Last Fluid Intake:  Lemonade    Patient/Family history of anesthesia or sedation complications: No      Sedation type: moderate (conscious) sedation      Analgesia:  Fentanyl    Sedation start:  6/30/2023 5:40 PM    Sedation end:  6/30/2023 5:53 PM    Vital signs: Vital signs monitored during sedation        Selections made in this section will also lock the Injury type section above.:     Manipulation performed?: Yes      Skeletal traction used?: Yes      Reduction successful?: No      Immobilization:  Sling  Post-procedure assessment:     Neurovascular status: Neurovascularly intact      " Distal perfusion: normal      Neurological function: normal      Range of motion: normal      Patient tolerance:  Patient tolerated the procedure well with no immediate complications  Procedural Sedation        Date/Time: 6/30/2023 6:02 PM    Performed by: Felix Perez MD  Authorized by: Felix Perez MD  Consent Done: Yes  Consent: Verbal consent obtained.  Consent given by: patient  Patient understanding: patient states understanding of the procedure being performed  Patient consent: the patient's understanding of the procedure matches consent given  Procedure consent: procedure consent matches procedure scheduled  Relevant documents: relevant documents present and verified  Test results: test results available and properly labeled  Site marked: the operative site was marked  Imaging studies: imaging studies available  Patient identity confirmed: verbally with patient  Time out: Immediately prior to procedure a "time out" was called to verify the correct patient, procedure, equipment, support staff and site/side marked as required.  ASA Class: Class 2 - Mild Illness without functional impairment.  Mallampati Score: Class 1 - Visualization of the soft palate, fauces, uvula, and anterior/posterior pillars.   NPO STATUS:  Date/Time of last solid: 6/29/2023 7:00 PM  Contents of last solid: Chinese food  Date/Time of last fluid: 6/29/2023 7:00 PM  Contents of last fluid: Lemonade    Equipment: on cardiac monitor., on BP monitor., on CO2 monitor., on supplemental oxygen., suction available., airway equipment available. and reversal drugs available.     Sedation type: moderate (conscious) sedation    Analgesia: fentanyl  Sedation start date/time: 6/30/2023 5:40 PM  Sedation end date/time: 6/30/2023 6:00 PM  Total Sedation Time (min): 20  Vitals: Vital signs were monitored during sedation.  Complications: No complications.   Patient/Family history of anesthesia or sedation complications: No      Labs " Reviewed   CBC W/ AUTO DIFFERENTIAL - Abnormal; Notable for the following components:       Result Value    WBC 15.27 (*)     RBC 3.19 (*)     Hemoglobin 8.6 (*)     Hematocrit 27.4 (*)     MCHC 31.4 (*)     RDW 16.2 (*)     Platelets 610 (*)     Immature Granulocytes 0.6 (*)     Gran # (ANC) 11.8 (*)     Immature Grans (Abs) 0.09 (*)     Gran % 77.3 (*)     Lymph % 13.9 (*)     All other components within normal limits   COMPREHENSIVE METABOLIC PANEL - Abnormal; Notable for the following components:    Glucose 155 (*)     Total Protein 8.6 (*)     Total Bilirubin 1.4 (*)     Alkaline Phosphatase 975 (*)     AST 76 (*)     ALT 51 (*)     eGFR 55.0 (*)     All other components within normal limits   LIPASE - Abnormal; Notable for the following components:    Lipase Result 388 (*)     All other components within normal limits   CULTURE, BLOOD   CULTURE, BLOOD   LIPASE   APTT   PROTIME-INR          Imaging Results               CT Shoulder Without Contrast Left (Final result)  Result time 06/30/23 21:07:45      Final result by Paul Linda MD (06/30/23 21:07:45)                   Impression:      Comminuted humeral fracture with main fracture plane just below the surgical neck of the humerus as above.    This report was flagged in Epic as abnormal.    All CT scans at this facility are performed  using dose modulation techniques as appropriate to performed exam including the following:  automated exposure control; adjustment of mA and/or kV according to the patients size (this includes techniques or standardized protocols for targeted exams where dose is matched to indication/reason for exam: i.e. extremities or head);  iterative reconstruction technique.      Electronically signed by: Paul Linda  Date:    06/30/2023  Time:    21:07               Narrative:    EXAMINATION:  CT SHOULDER WITHOUT CONTRAST LEFT    CLINICAL HISTORY:  Fracture, shoulder;    TECHNIQUE:  Low-dose axial noncontrast CT images of the  shoulder were obtained.  Multiplanar reformats.    COMPARISON:  Radiograph same date.    FINDINGS:  Comminuted fracture with main fracture plane just below the surgical neck of the humerus, predominantly transversely oriented.  Additional fracture plane extending superiorly along the lateral humerus but not extending to the superior most margin of the humerus.  Fracture is displaced on the order of 2 cm.  Moderate angulation.  Associated soft tissue swelling.    No additional fractures identified.                                       US Abdomen Limited (Final result)  Result time 06/30/23 18:57:53      Final result by Otilia Ingram MD (06/30/23 18:57:53)                   Impression:      No discrete pathology, small volume fluid at the surgical bed gallbladder fossa not unexpected recent operative change      Electronically signed by: Otilia Ingram  Date:    06/30/2023  Time:    18:57               Narrative:    EXAMINATION:  US ABDOMEN LIMITED    CLINICAL HISTORY:  Elevated liver enzymes and lipase;    TECHNIQUE:  Limited ultrasound of the right upper quadrant of the abdomen (including pancreas, liver, gallbladder, common bile duct, and spleen) was performed.    COMPARISON:  No relevant comparison    FINDINGS:  Reported recent cholecystectomy with small volume fluid at the surgical bed not unexpected.  CBD diameter 7 mm with stent.  Liver measurements 17 cm.  No discrete pancreatic pathology.  Right kidney without hydronephrosis and spleen normal size                                        X-Ray Humerus 2 View Left (Final result)  Result time 06/30/23 18:12:35      Final result by Paul Linda MD (06/30/23 18:12:35)                   Impression:      As above.    This report was flagged in Epic as abnormal.      Electronically signed by: Paul Linda  Date:    06/30/2023  Time:    18:12               Narrative:    EXAMINATION:  XR HUMERUS 2 VIEW LEFT    CLINICAL HISTORY:  Other injury of  unspecified body region, initial encounter    TECHNIQUE:  Two views left humerus    COMPARISON:  None    FINDINGS:  Acute displaced comminuted fracture of the left humerus with fracture planes obliquely oriented just distal to the surgical neck the humerus and extending superiorly with nondisplaced fracture likely through the greater tuberosity.                                       X-Ray Shoulder 1 View Left (Final result)  Result time 06/30/23 16:25:57   Procedure changed from X-Ray Shoulder 2 or More Views Left     Final result by JOSE J Virk Sr., MD (06/30/23 16:25:57)                   Impression:      There is an acute appearing fracture in the proximal diaphyseal portion of the left humerus. The distal bony fracture fragment is displaced superiorly and medially in respect to the proximal bony fracture fragment of the left humerus.      Electronically signed by: Mathew Virk MD  Date:    06/30/2023  Time:    16:25               Narrative:    EXAMINATION:  XR SHOULDER 1 VIEW LEFT    CLINICAL HISTORY:  injury;    COMPARISON:  None    FINDINGS:  There is an acute appearing fracture in the proximal diaphyseal portion of the left humerus.  The distal bony fracture fragment is displaced superiorly and medially in respect to the proximal bony fracture fragment of the left humerus.  There is no evidence of a dislocation.                                       CT Head Without Contrast (Final result)  Result time 06/30/23 16:29:03      Final result by Paul Linda MD (06/30/23 16:29:03)                   Impression:      Left occipital encephalomalacia.  Mild microvascular ischemic change.    All CT scans at this facility are performed  using dose modulation techniques as appropriate to performed exam including the following:  automated exposure control; adjustment of mA and/or kV according to the patients size (this includes techniques or standardized protocols for targeted exams where dose is matched to  indication/reason for exam: i.e. extremities or head);  iterative reconstruction technique.      Electronically signed by: Paul Maddi  Date:    06/30/2023  Time:    16:29               Narrative:    EXAMINATION:  CT HEAD WITHOUT CONTRAST    CLINICAL HISTORY:  Mental status change, unknown cause;    TECHNIQUE:  Low dose axial CT images obtained throughout the head without intravenous contrast. Sagittal and coronal reconstructions were performed.    COMPARISON:  Multiple priors.    FINDINGS:  Intracranial compartment:    Ventricles and sulci are normal in size for age without evidence of hydrocephalus. No extra-axial blood or fluid collections.    Left occipital encephalomalacia.  Mild microvascular ischemic change.  No parenchymal mass, hemorrhage, edema or major vascular distribution infarct.    Skull/extracranial contents (limited evaluation): No fracture. Mastoid air cells and paranasal sinuses are essentially clear.                                       Medications   ketorolac injection 15 mg (15 mg Intravenous Given 6/30/23 1652)   fentaNYL 50 mcg/mL injection 100 mcg (100 mcg Intravenous Given 6/30/23 1743)   ondansetron injection 4 mg (4 mg Intravenous Given 6/30/23 1746)   sodium chloride 0.9% bolus 1,000 mL 1,000 mL (0 mLs Intravenous Stopped 6/30/23 2014)     Medical Decision Making:   Initial Assessment:   63-year-old male with fall and fracture of her left upper arm.  No loss of consciousness.  Questionable pseudo seizure-like activity probably secondary to fracture/pain.  No neurological deficits.  Differential Diagnosis:   Humerus fracture, dislocation, head injury, pseudo-seizure, seizure.  Independently Interpreted Test(s):   I have ordered and independently interpreted X-rays - see summary below.       <> Summary of X-Ray Reading(s): Left humerus displaced fracture.  Clinical Tests:   Lab Tests: Ordered and Reviewed       <> Summary of Lab: Persistent elevated in liver enzymes, bili and  lipase.  Elevated WBC.  Radiological Study: Ordered and Reviewed  ED Management:  Tried to reduce fracture but looks unstable.  Neurovascular intact.  Placed for orthopedics consult.  Status post cholecystectomy and stent placement.  With persistent elevation in liver enzymes.  Pending ultrasound.  Patient checked out to Dr. Pendleton at shift change.                          Clinical Impression:   Final diagnoses:  [S42.202A] Closed fracture of proximal end of left humerus, unspecified fracture morphology, initial encounter (Primary)  [W19.XXXA] Fall, initial encounter        ED Disposition Condition    Discharge           ED Prescriptions       Medication Sig Dispense Start Date End Date Auth. Provider    HYDROcodone-acetaminophen (NORCO) 5-325 mg per tablet Take 1 tablet by mouth every 6 (six) hours as needed. 12 tablet 6/30/2023 -- Tomasz Pendleton, DO          Follow-up Information       Follow up With Specialties Details Why Contact Info    Jasbir Haney MD Internal Medicine Schedule an appointment as soon as possible for a visit   [address] [facility]  [place] [address]  [phone]      Lisa Gonzalez MD Orthopedic Surgery Schedule an appointment as soon as possible for a visit   [address] [facility] [address]  [phone]               Felix Perez MD  07/01/23 0612       Felix Perez MD  09/01/23 2823

## 2023-07-03 ENCOUNTER — HOSPITAL ENCOUNTER (OUTPATIENT)
Dept: RADIOLOGY | Facility: HOSPITAL | Age: 63
Discharge: HOME | End: 2023-07-03
Attending: NURSE PRACTITIONER
Payer: COMMERCIAL

## 2023-07-03 ENCOUNTER — TELEPHONE (OUTPATIENT)
Dept: PREADMISSION TESTING | Facility: HOSPITAL | Age: 63
End: 2023-07-03
Payer: COMMERCIAL

## 2023-07-03 ENCOUNTER — OFFICE VISIT (OUTPATIENT)
Dept: ORTHOPEDICS | Facility: CLINIC | Age: 63
End: 2023-07-03
Payer: COMMERCIAL

## 2023-07-03 VITALS — BODY MASS INDEX: 23.9 KG/M2 | HEIGHT: 64 IN | WEIGHT: 140 LBS

## 2023-07-03 DIAGNOSIS — S42.202A CLOSED FRACTURE OF PROXIMAL END OF LEFT HUMERUS, UNSPECIFIED FRACTURE MORPHOLOGY, INITIAL ENCOUNTER: ICD-10-CM

## 2023-07-03 DIAGNOSIS — S42.202A CLOSED FRACTURE OF PROXIMAL END OF LEFT HUMERUS, UNSPECIFIED FRACTURE MORPHOLOGY, INITIAL ENCOUNTER: Primary | ICD-10-CM

## 2023-07-03 PROCEDURE — 3008F PR BODY MASS INDEX (BMI) DOCUMENTED: ICD-10-PCS | Mod: CPTII,S$GLB,, | Performed by: NURSE PRACTITIONER

## 2023-07-03 PROCEDURE — 73030 X-RAY EXAM OF SHOULDER: CPT | Mod: TC,LT

## 2023-07-03 PROCEDURE — 1159F PR MEDICATION LIST DOCUMENTED IN MEDICAL RECORD: ICD-10-PCS | Mod: CPTII,S$GLB,, | Performed by: NURSE PRACTITIONER

## 2023-07-03 PROCEDURE — 99205 PR OFFICE/OUTPT VISIT, NEW, LEVL V, 60-74 MIN: ICD-10-PCS | Mod: S$GLB,,, | Performed by: NURSE PRACTITIONER

## 2023-07-03 PROCEDURE — 1111F DSCHRG MED/CURRENT MED MERGE: CPT | Mod: CPTII,S$GLB,, | Performed by: NURSE PRACTITIONER

## 2023-07-03 PROCEDURE — 73030 XR SHOULDER COMPLETE 2 OR MORE VIEWS LEFT: ICD-10-PCS | Mod: 26,LT,, | Performed by: RADIOLOGY

## 2023-07-03 PROCEDURE — 1159F MED LIST DOCD IN RCRD: CPT | Mod: CPTII,S$GLB,, | Performed by: NURSE PRACTITIONER

## 2023-07-03 PROCEDURE — 3066F PR DOCUMENTATION OF TREATMENT FOR NEPHROPATHY: ICD-10-PCS | Mod: CPTII,S$GLB,, | Performed by: NURSE PRACTITIONER

## 2023-07-03 PROCEDURE — 3061F NEG MICROALBUMINURIA REV: CPT | Mod: CPTII,S$GLB,, | Performed by: NURSE PRACTITIONER

## 2023-07-03 PROCEDURE — 99999 PR PBB SHADOW E&M-EST. PATIENT-LVL V: ICD-10-PCS | Mod: PBBFAC,,, | Performed by: NURSE PRACTITIONER

## 2023-07-03 PROCEDURE — 1160F RVW MEDS BY RX/DR IN RCRD: CPT | Mod: CPTII,S$GLB,, | Performed by: NURSE PRACTITIONER

## 2023-07-03 PROCEDURE — 3052F PR MOST RECENT HEMOGLOBIN A1C LEVEL 8.0 - < 9.0%: ICD-10-PCS | Mod: CPTII,S$GLB,, | Performed by: NURSE PRACTITIONER

## 2023-07-03 PROCEDURE — 1111F PR DISCHARGE MEDS RECONCILED W/ CURRENT OUTPATIENT MED LIST: ICD-10-PCS | Mod: CPTII,S$GLB,, | Performed by: NURSE PRACTITIONER

## 2023-07-03 PROCEDURE — 3061F PR NEG MICROALBUMINURIA RESULT DOCUMENTED/REVIEW: ICD-10-PCS | Mod: CPTII,S$GLB,, | Performed by: NURSE PRACTITIONER

## 2023-07-03 PROCEDURE — 3052F HG A1C>EQUAL 8.0%<EQUAL 9.0%: CPT | Mod: CPTII,S$GLB,, | Performed by: NURSE PRACTITIONER

## 2023-07-03 PROCEDURE — 3066F NEPHROPATHY DOC TX: CPT | Mod: CPTII,S$GLB,, | Performed by: NURSE PRACTITIONER

## 2023-07-03 PROCEDURE — 99205 OFFICE O/P NEW HI 60 MIN: CPT | Mod: S$GLB,,, | Performed by: NURSE PRACTITIONER

## 2023-07-03 PROCEDURE — 99999 PR PBB SHADOW E&M-EST. PATIENT-LVL V: CPT | Mod: PBBFAC,,, | Performed by: NURSE PRACTITIONER

## 2023-07-03 PROCEDURE — 4010F PR ACE/ARB THEARPY RXD/TAKEN: ICD-10-PCS | Mod: CPTII,S$GLB,, | Performed by: NURSE PRACTITIONER

## 2023-07-03 PROCEDURE — 3008F BODY MASS INDEX DOCD: CPT | Mod: CPTII,S$GLB,, | Performed by: NURSE PRACTITIONER

## 2023-07-03 PROCEDURE — 1160F PR REVIEW ALL MEDS BY PRESCRIBER/CLIN PHARMACIST DOCUMENTED: ICD-10-PCS | Mod: CPTII,S$GLB,, | Performed by: NURSE PRACTITIONER

## 2023-07-03 PROCEDURE — 73030 X-RAY EXAM OF SHOULDER: CPT | Mod: 26,LT,, | Performed by: RADIOLOGY

## 2023-07-03 PROCEDURE — 4010F ACE/ARB THERAPY RXD/TAKEN: CPT | Mod: CPTII,S$GLB,, | Performed by: NURSE PRACTITIONER

## 2023-07-03 RX ORDER — OXYCODONE HYDROCHLORIDE 5 MG/1
5 TABLET ORAL EVERY 4 HOURS PRN
Qty: 42 TABLET | Refills: 0 | Status: ON HOLD | OUTPATIENT
Start: 2023-07-03 | End: 2023-08-23 | Stop reason: ALTCHOICE

## 2023-07-03 RX ORDER — MUPIROCIN 20 MG/G
OINTMENT TOPICAL
Status: CANCELLED | OUTPATIENT
Start: 2023-07-03

## 2023-07-03 RX ORDER — METHOCARBAMOL 500 MG/1
500 TABLET, FILM COATED ORAL 4 TIMES DAILY
Qty: 40 TABLET | Refills: 0 | Status: SHIPPED | OUTPATIENT
Start: 2023-07-03 | End: 2023-07-13

## 2023-07-03 RX ORDER — EVOLOCUMAB 140 MG/ML
140 INJECTION, SOLUTION SUBCUTANEOUS
Qty: 2 ML | Refills: 6 | OUTPATIENT
Start: 2023-07-03 | End: 2023-07-31

## 2023-07-03 NOTE — TELEPHONE ENCOUNTER
Refill Routing Note   Medication(s) are not appropriate for processing by [facility] for the following reason(s):      - Outside of protocol    [facility] action(s):  Route          Medication reconciliation completed: No     Appointments  past 12m or future 3m with PCP    Date Provider   Last Visit   5/31/2023 Jasbir Haney MD   Next Visit   7/6/2023 Jasbir Haney MD   ED visits in past 90 days: 1        Note composed:3:58 PM 07/03/2023

## 2023-07-03 NOTE — TELEPHONE ENCOUNTER
----- Message from Mervat Zarate MA sent at 7/3/2023 10:08 AM CDT -----  Hey good morning, pt is needing an appt next week for sx clearance. Thanks

## 2023-07-03 NOTE — PROGRESS NOTES
SUBJECTIVE:     Chief Complaint & History of Present Illness:  Mariann Huff is a New 63 y.o. year old female patient presenting with constant left shoulder pain that started 6/30/23.  She is Right hand dominant.  There is a history of injury.  She reports she fell out the shower in between the shower door and onto the floor on her left arm.  She felt immediate pain to her left upper arm and was taken to the emergency department where she would x-rays and CT scans were taken.  Patient was found to have a proximal humerus fracture on the left.  Her  states the medical provider tried to reset the shoulder but was unable to do so.  She was placed in a sling and prescribed [place] and referred to Orthopedics.    Patient's spouse reports the patient was recently discharged from the hospital last week due to pancreatitis.  She underwent an ERCP where she was found to have bile duct obstruction.  She ended up having her gallbladder removed.  She has elevated liver enzymes as well as a decrease in her kidney function.  She is currently on Eliquis secondary to cardiac stents.  Her past medical history includes type 2 diabetes mellitus, encephalopathy, coronary artery disease, heart failure, peripheral arterial disease, bradycardia, chronic kidney disease stage 3, acute tubular necrosis, malnutrition, PFEIFFER, sleep apnea, DVT to right lower extremity.    Currently the patient reports pain to the left upper shoulder.  She is currently taking Norco 5/325 several times a day with little improvement in her pain.  She rates her pain at an 8/10.  Describes the pain as an achy throbbing pain.  She denies any numbness or tingling sensation down her arm.  Denies any prior injury to her left shoulder area.    Principle Orthopedic Problem    ICD-10-CM ICD-9-CM   1. Closed fracture of proximal end of left humerus, unspecified fracture morphology, initial encounter  S42.202A 812.00       Date of injury 6/30/23    Lives Home at  "home with Spouse             Independent community ambulator, no gait aids   Works as a disable   Does not use tobacco   Does have diabetes   Does have a history of CAD, Malnutrition, PFEIFFER, PAD, Heart Failure, CKD stage 3, Sleep apnea, acute tubular necrosis on Eliquis   Estimated body mass index is 24.03 kg/m² as calculated from the following:    Height as of this encounter: 5' 4" (1.626 m).    Weight as of this encounter: 63.5 kg (139 lb 15.9 oz).      Review of patient's allergies indicates:   Allergen Reactions    Milk containing products (dairy)      Causes GI distress         Current Outpatient Medications   Medication Sig Dispense Refill    ACCU-CHEK EDIN PLUS METER Misc       albuterol (PROVENTIL/VENTOLIN HFA) 90 mcg/actuation inhaler Inhale 2 puffs into the lungs every 6 (six) hours as needed for Wheezing. 1 g 3    amLODIPine (NORVASC) 10 MG tablet Take 1 tablet (10 mg total) by mouth once daily. 90 tablet 2    apixaban (ELIQUIS) 2.5 mg Tab Take 1 tablet (2.5 mg total) by mouth 2 (two) times daily. 180 tablet 1    aspirin 81 mg Tab Take 81 mg by mouth once daily. 1 Tablet Oral Every day      atorvastatin (LIPITOR) 40 MG tablet Take 1 tablet (40 mg total) by mouth every evening. 90 tablet 3    blood sugar diagnostic (ACCU-CHEK EDIN PLUS TEST STRP) Strp TEST BLOOD SUGARS 4 TIMES DAILY 150 strip 11    dapagliflozin (FARXIGA) 10 mg tablet Take 1 tablet (10 mg total) by mouth once daily. 90 tablet 3    diclofenac sodium (VOLTAREN) 1 % Gel Apply 2 g topically 3 (three) times daily. Apply to the area of pain 2-3x per day or night as needed 100 g 3    EScitalopram oxalate (LEXAPRO) 10 MG tablet Take 1 tablet (10 mg total) by mouth once daily. 90 tablet 2    evolocumab (REPATHA SURECLICK) 140 mg/mL PnIj Inject 1 mL (140 mg total) into the skin every 14 (fourteen) days. 2 mL 6    gabapentin (NEURONTIN) 300 MG capsule Take 1 capsule (300 mg) in the morning and 2 capsules (600 mg) in the evening 90 capsule 0    " "glipiZIDE (GLUCOTROL) 10 MG TR24 Take 1 tablet (10 mg total) by mouth daily with breakfast. 90 tablet 3    hydrALAZINE (APRESOLINE) 25 MG tablet Take 1 tablet (25 mg total) by mouth every 12 (twelve) hours. 60 tablet 2    HYDROcodone-acetaminophen (NORCO) 5-325 mg per tablet Take 1 tablet by mouth every 6 (six) hours as needed. 12 tablet 0    insulin detemir U-100, Levemir, (LEVEMIR FLEXPEN) 100 unit/mL (3 mL) InPn pen Inject 14 Units into the skin every evening. 12 mL 3    isosorbide mononitrate (ISMO,MONOKET) 10 mg tablet Take 1 tablet (10 mg total) by mouth once daily. 90 tablet 2    multivitamin (THERAGRAN) per tablet Take 1 tablet by mouth once daily.      pen needle, diabetic (NOVOTWIST) 32 gauge x 1/5" Ndle Use 1 needle to inject insulin four times daily 400 each 2    pen needle, diabetic 32 gauge x 5/32" Ndle Use as directed 100 each 0    cilostazoL (PLETAL) 100 MG Tab Take 1 tablet (100 mg total) by mouth 2 (two) times daily. (Patient not taking: Reported on 6/9/2023) 60 tablet 11     No current facility-administered medications for this visit.     Facility-Administered Medications Ordered in Other Visits   Medication Dose Route Frequency Provider Last Rate Last Admin    0.9%  NaCl infusion   Intravenous Continuous Aime George  mL/hr at 10/27/22 0842 New Bag at 10/27/22 0842       Past Medical History:   Diagnosis Date    Anticoagulant long-term use     Asthma     Back pain     Bradycardia, unspecified 5/8/2019    The etiology of the bradycardic episode is unclear.  The have appear to be respiratory in origin (at least the 1st episode).  We will continue to monitor carefully.  We are awaiting evaluation by Cardiology.      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, with long-term current use of insulin 2020       Past Surgical History:   Procedure Laterality Date    ANTEGRADE NEPHROSTOGRAPHY Left 2019    Procedure: Nephrostogram - antegrade;  Surgeon: Robin Boyd MD;  [facility];  Service: Urology;  Laterality: Left;    BRONCHOSCOPY N/A 2019    Procedure: BRONCHOSCOPY;  Surgeon: Sean Ruano MD;  Location: [facility] OR [address] [place] [address];  Service: General;  Laterality: N/A;    CARDIAC CATHETERIZATION      CATARACT EXTRACTION      cataract extraction left eye      cataracts      CAUDAL EPIDURAL STEROID INJECTION N/A 2019    Procedure: Injection-steroid-epidural-caudal;  Surgeon: Dave Bentley Jr., MD;  Location: [facility] PAIN MGT;  Service: Pain Management;  Laterality: N/A;     SECTION, LOW TRANSVERSE      COLONOSCOPY N/A 2019    Procedure: COLONOSCOPY;  Surgeon: Al Alaniz MD;  Location: [facility] (2ND FLR);  Service: Endoscopy;  Laterality: N/A;    CORONARY ANGIOPLASTY      CYSTOGRAM N/A 2019    Procedure: CYSTOGRAM INTRAOP;  Surgeon: Robin Boyd MD;  [facility];  Service: Urology;  Laterality: N/A;  1 HOUR    CYSTOSCOPY W/ RETROGRADES Left 2019    Procedure: CYSTOSCOPY, WITH RETROGRADE PYELOGRAM;  Surgeon: Robin Boyd MD;  [facility];  Service: Urology;  Laterality: Left;  fluro    ENDOSCOPIC ULTRASOUND OF UPPER GASTROINTESTINAL TRACT N/A 6/15/2023    Procedure: ULTRASOUND, UPPER GI TRACT, ENDOSCOPIC;  Surgeon: Lisa Kim MD;  Location: [facility] ENDO (2ND FLR);  Service: Endoscopy;  Laterality: N/A;    ERCP N/A 6/15/2023    Procedure: ERCP (ENDOSCOPIC  RETROGRADE CHOLANGIOPANCREATOGRAPHY);  Surgeon: Lisa Kim MD;  Location: [facility] ENDO (2ND FLR);  Service: Endoscopy;  Laterality: N/A;    ESOPHAGOGASTRODUODENOSCOPY W/ PEG  5/2/2019    Procedure: EGD, WITH PEG TUBE INSERTION;  Surgeon: Sean Ruano MD;  Location: [facility] OR 2ND FLR;  Service: General;;    EXCISION TURBINATE, SUBMUCOUS      FLEXIBLE SIGMOIDOSCOPY N/A 5/13/2019    Procedure: SIGMOIDOSCOPY, FLEXIBLE;  Surgeon: ALBERTO Amin MD;  Location: [facility] ENDO (2ND FLR);  Service: Endoscopy;  Laterality: N/A;    FLEXIBLE SIGMOIDOSCOPY N/A 5/21/2019    Procedure: SIGMOIDOSCOPY, FLEXIBLE;  Surgeon: ALBERTO Aimn MD;  Location: [facility] ENDO (2ND FLR);  Service: Endoscopy;  Laterality: N/A;    FUSION OF LUMBAR SPINE BY ANTERIOR APPROACH Left 4/12/2019    Procedure: FUSION, SPINE, LUMBAR, ANTERIOR APPROACH Left L5-S1 Anterior to Psoa Interbody Fusion, L5-S1 Posterior Instrumentation;  Surgeon: Mk George MD;  [address]  Service: Neurosurgery;  Laterality: Left;  Porcedure:  Left L5-S1 Anterior to Psoa Interbody Fusion, L5-S1 Posterior Instrumentation  Surgery Time: 4 Hrs  LOS: 2-3  Anesthesia: General   Blood: Type & Screen  R    HAND SURGERY Left     HAND SURGERY Right     torn ligament repair/ Dr. Yeboah    HYSTERECTOMY      INJECTION OF STEROID Right 12/10/2020    Procedure: INJECTION, STEROID Right SI Joint Block and Steroid Injection;  Surgeon: Mk George MD;  Location: [facility] OR;  Service: Neurosurgery;  Laterality: Right;  Procedure: Right SI Joint Block and Steroid Injection   SUrgery Time: 30 Min  LOS: 0  Anesthesia: MAC  Radiology: C-arm  Bed: [place] [id number] Poster  Position: Prone    INJECTION OF STEROID Right 9/28/2021    Procedure: INJECTION, STEROID Right SI joint block & steroid injection;  Surgeon: Mk George MD;  Location: [facility] OR;  Service: Neurosurgery;  Laterality: Right;  Procedure: Right SI joint block & steroid injection  Surgery Time: 30m  Anesthesia: Local  MAC  Radiology: C-arm  Bed: Regular  Position: Prone    LAPAROSCOPIC CHOLECYSTECTOMY N/A 6/23/2023    Procedure: CHOLECYSTECTOMY, LAPAROSCOPIC;  Surgeon: Richard Penny MD;  [address];  Service: General;  Laterality: N/A;    left foot surgery      left wrist surgery      LYSIS OF ADHESIONS N/A 2/19/2020    Procedure: LYSIS, ADHESIONS;  Surgeon: Robin Boyd MD;  Location: [facility] OR [address] [place] [address];  Service: Urology;  Laterality: N/A;    NASAL SEPTUM SURGERY  5/7/15    PERCUTANEOUS NEPHROSTOMY Left 4/21/2019    Procedure: Creation, Nephrostomy, Percutaneous;  Surgeon: Karina Surgeon;  Location: [facility];  Service: Anesthesiology;  Laterality: Left;    REPAIR OF URETER  4/12/2019    Procedure: REPAIR, URETER;  Surgeon: Mk George MD;  Location: [address];  Service: Neurosurgery;;    REPLACEMENT OF NEPHROSTOMY TUBE N/A 7/18/2019    Procedure: REPLACEMENT, NEPHROSTOMY TUBE;  Surgeon: [facility] Diagnostic Provider;  [address];  Service: Anesthesiology;  Laterality: N/A;  188    REPLACEMENT OF NEPHROSTOMY TUBE N/A 7/24/2019    Procedure: REPLACEMENT, NEPHROSTOMY TUBE;  Surgeon: Dos Diagnostic Provider;  Location: [facility] OR [address] [place] [address];  Service: Anesthesiology;  Laterality: N/A;  188    REPLACEMENT OF NEPHROSTOMY TUBE N/A 10/7/2019    Procedure: REPLACEMENT, NEPHROSTOMY TUBE;  Surgeon: Dos Diagnostic Provider;  [facility];  Service: Anesthesiology;  Laterality: N/A;  189    REPLACEMENT OF NEPHROSTOMY TUBE N/A 11/25/2019    Procedure: REPLACEMENT, NEPHROSTOMY TUBE;  Surgeon: Dos Diagnostic Provider;  [facility];  Service: Anesthesiology;  Laterality: N/A;  Room 188/[place]    REPLACEMENT OF NEPHROSTOMY TUBE Right 2/19/2020    Procedure: REPLACEMENT, NEPHROSTOMY TUBE removal removal;  Surgeon: Robin Boyd MD;  Location: [address];  Service: Urology;  Laterality: Right;    rt elbow surgery      S/P LAD COATED STENT  05/14/2010    6 total     S/P OM1 STENT  08/2003     SINUS SURGERY      F.E.S.S.    TRACHEOSTOMY N/A 5/2/2019    Procedure: CREATION, TRACHEOSTOMY;  Surgeon: Sean Ruano MD;  Location: [facility] OR [address] [place] [address];  Service: General;  Laterality: N/A;    TUBAL LIGATION      URETERAL REIMPLANTION Left 2/19/2020    Procedure: REIMPLANTATION, URETER WITH PSOAS HITCH;  Surgeon: Robin Boyd MD;  Location: [facility] OR [address] [place] [address];  Service: Urology;  Laterality: Left;       Family History   Problem Relation Age of Onset    Diabetes Mother     Heart disease Mother     Diabetes Father     Leukemia Father         leukemia    Heart attack Father     Diabetes Sister     Diabetes Brother     Diabetes Sister     No Known Problems Sister     No Known Problems Brother     No Known Problems Brother     No Known Problems Maternal Grandmother     No Known Problems Maternal Grandfather     No Known Problems Paternal Grandmother     No Known Problems Paternal Grandfather     No Known Problems Son     No Known Problems Son     No Known Problems Maternal Aunt     No Known Problems Maternal Uncle     No Known Problems Paternal Aunt     No Known Problems Paternal Uncle     Colon cancer Neg Hx     Inflammatory bowel disease Neg Hx     Melanoma Neg Hx     Psoriasis Neg Hx     Lupus Neg Hx     Eczema Neg Hx     Acne Neg Hx     Amblyopia Neg Hx     Blindness Neg Hx     Cancer Neg Hx     Cataracts Neg Hx     Glaucoma Neg Hx     Hypertension Neg Hx     Macular degeneration Neg Hx     Retinal detachment Neg Hx     Strabismus Neg Hx     Stroke Neg Hx     Thyroid disease Neg Hx     Heart failure Neg Hx     Hyperlipidemia Neg Hx      Review of Systems:  ROS:  Constitutional: no fever or chills  Eyes: no visual changes  ENT: no nasal congestion or sore throat  Respiratory: no cough or shortness of breath  Cardiovascular: no chest pain or palpitations  Gastrointestinal: no nausea or vomiting, tolerating diet  Genitourinary: no hematuria or dysuria  Integument/Breast: no rash or pruritis  Hematologic/Lymphatic:  "no easy bruising or lymphadenopathy  Musculoskeletal:  left arm fracture  Neurological: no seizures or tremors  Behavioral/Psych: no auditory or visual hallucinations  Endocrine: no heat or cold intolerance      OBJECTIVE:     PHYSICAL EXAM:  Vital Signs (Most Recent)  There were no vitals filed for this visit.  Height: 5' 4" (162.6 cm) Weight: 63.5 kg (139 lb 15.9 oz),   Estimated body mass index is 24.03 kg/m² as calculated from the following:    Height as of this encounter: 5' 4" (1.626 m).    Weight as of this encounter: 63.5 kg (139 lb 15.9 oz).   General Appearance: Well nourished, well developed, in no acute distress.  HENT: Normal cephalic, oropharynx pink and moist  Eyes: PERRLA bilaterally and EOM x 4  Respiratory: Even and unlabored  Skin: Warm and Dry.   Psychiatric: AAO x 4, Mood & affect are normal.    Shoulder exam: left  Tenderness: upper arm  ROM: not tested  Shoulder Strength: not tested  non-specific diffuse tenderness about the shoulder, sensory exam normal, and radial pulse intact    RADIOGRAPHS:  X-ray of the left shoulder, left humerus, CT of the left shoulder dated June 30, 2023 was personally reviewed by me.  Patient has a displaced proximal humerus fracture.  No other fracture seen.  All radiographs were personally reviewed by me.    Xray of the left shoulder was obtained today after speaking with Dr. Leary.  She has a comminuted and displaced proximal right humeral fracture that appears to be similar to CT of her shoulder.    ASSESSMENT/PLAN:       ICD-10-CM ICD-9-CM   1. Closed fracture of proximal end of left humerus, unspecified fracture morphology, initial encounter  S42.202A 812.00     -Mariann TRISTIN Huff presents to clinic with c/c left proximal humerus fracture secondary to a fall on June 30, 2023.  Patient was recently discharged from the hospital for an acute pancreatitis resulting in removal of her gallbladder.  In review of her records she has an elevated alkaline phosphatase, " white blood cell count, decreased H&H, elevated liver enzymes, and renal impairment.  -X-ray as above.    I has this case with Dr. Leary.  He is recommending surgical intervention.  Given her complex medical history patient will need to be medically optimized by the perioperative clinic.  I sent a message to Dr. Marin, he will get her in to his clinic for surgical clearance.    Will treat her pain using a multimodal approach.  We will need to limit use of Tylenol secondary to her liver disease.    Consents signed and case request placed.  Patient is currently on Eliquis and will need to hold her Eliquis for 3 days prior to surgery.  Planning for surgical fixation on July 11th.    Pre, rebecca, and post-operative procedure and expectations were discussed.  Questions were answered. The patient has been educated and is ready to proceed with surgery.  Approximately 30 minutes was spent discussing surgical outcomes, plans, procedures, pre, rebecca, and post-operative expectations and care. The risks, benefits and alternatives to surgery were discussed with the patient at great length.  These include bleeding, infection, vessel/nerve damage, pain, numbness, tingling, complex regional pain syndrome, hardware/surgical failure, need for further surgery, malunion, nonunion, DVT, PE, arthritis and death.     [facility] also understands that the risks of surgery may be greater for some patients due to health or lifestyle issues, such as a current condition or a history of heart disease, obesity, clotting disorders, recurrent infections, smoking, sedentary lifestyle, or noncompliance with medications, therapy, or follow-up. The degree of the increased risk is hard to estimate with any degree of precision.      Patient states an understanding and wishes to proceed with surgery.   All questions were answered.  No guarantees were implied or stated.  Informed consent was obtained.  The patient will contact us if they have any  questions, concerns, and changes in their medical condition prior to surgery.

## 2023-07-03 NOTE — H&P (VIEW-ONLY)
SUBJECTIVE:     Chief Complaint & History of Present Illness:  Mariann Huff is a New 63 y.o. year old female patient presenting with constant left shoulder pain that started 6/30/23.  She is Right hand dominant.  There is a history of injury.  She reports she fell out the shower in between the shower door and onto the floor on her left arm.  She felt immediate pain to her left upper arm and was taken to the emergency department where she would x-rays and CT scans were taken.  Patient was found to have a proximal humerus fracture on the left.  Her  states the medical provider tried to reset the shoulder but was unable to do so.  She was placed in a sling and prescribed [place] and referred to Orthopedics.    Patient's spouse reports the patient was recently discharged from the hospital last week due to pancreatitis.  She underwent an ERCP where she was found to have bile duct obstruction.  She ended up having her gallbladder removed.  She has elevated liver enzymes as well as a decrease in her kidney function.  She is currently on Eliquis secondary to cardiac stents.  Her past medical history includes type 2 diabetes mellitus, encephalopathy, coronary artery disease, heart failure, peripheral arterial disease, bradycardia, chronic kidney disease stage 3, acute tubular necrosis, malnutrition, PFEIFFER, sleep apnea, DVT to right lower extremity.    Currently the patient reports pain to the left upper shoulder.  She is currently taking Norco 5/325 several times a day with little improvement in her pain.  She rates her pain at an 8/10.  Describes the pain as an achy throbbing pain.  She denies any numbness or tingling sensation down her arm.  Denies any prior injury to her left shoulder area.    Principle Orthopedic Problem    ICD-10-CM ICD-9-CM   1. Closed fracture of proximal end of left humerus, unspecified fracture morphology, initial encounter  S42.202A 812.00       Date of injury 6/30/23    Lives Home at  "home with Spouse             Independent community ambulator, no gait aids   Works as a disable   Does not use tobacco   Does have diabetes   Does have a history of CAD, Malnutrition, PFEIFFER, PAD, Heart Failure, CKD stage 3, Sleep apnea, acute tubular necrosis on Eliquis   Estimated body mass index is 24.03 kg/m² as calculated from the following:    Height as of this encounter: 5' 4" (1.626 m).    Weight as of this encounter: 63.5 kg (139 lb 15.9 oz).      Review of patient's allergies indicates:   Allergen Reactions    Milk containing products (dairy)      Causes GI distress         Current Outpatient Medications   Medication Sig Dispense Refill    ACCU-CHEK EDIN PLUS METER Misc       albuterol (PROVENTIL/VENTOLIN HFA) 90 mcg/actuation inhaler Inhale 2 puffs into the lungs every 6 (six) hours as needed for Wheezing. 1 g 3    amLODIPine (NORVASC) 10 MG tablet Take 1 tablet (10 mg total) by mouth once daily. 90 tablet 2    apixaban (ELIQUIS) 2.5 mg Tab Take 1 tablet (2.5 mg total) by mouth 2 (two) times daily. 180 tablet 1    aspirin 81 mg Tab Take 81 mg by mouth once daily. 1 Tablet Oral Every day      atorvastatin (LIPITOR) 40 MG tablet Take 1 tablet (40 mg total) by mouth every evening. 90 tablet 3    blood sugar diagnostic (ACCU-CHEK EDIN PLUS TEST STRP) Strp TEST BLOOD SUGARS 4 TIMES DAILY 150 strip 11    dapagliflozin (FARXIGA) 10 mg tablet Take 1 tablet (10 mg total) by mouth once daily. 90 tablet 3    diclofenac sodium (VOLTAREN) 1 % Gel Apply 2 g topically 3 (three) times daily. Apply to the area of pain 2-3x per day or night as needed 100 g 3    EScitalopram oxalate (LEXAPRO) 10 MG tablet Take 1 tablet (10 mg total) by mouth once daily. 90 tablet 2    evolocumab (REPATHA SURECLICK) 140 mg/mL PnIj Inject 1 mL (140 mg total) into the skin every 14 (fourteen) days. 2 mL 6    gabapentin (NEURONTIN) 300 MG capsule Take 1 capsule (300 mg) in the morning and 2 capsules (600 mg) in the evening 90 capsule 0    " "glipiZIDE (GLUCOTROL) 10 MG TR24 Take 1 tablet (10 mg total) by mouth daily with breakfast. 90 tablet 3    hydrALAZINE (APRESOLINE) 25 MG tablet Take 1 tablet (25 mg total) by mouth every 12 (twelve) hours. 60 tablet 2    HYDROcodone-acetaminophen (NORCO) 5-325 mg per tablet Take 1 tablet by mouth every 6 (six) hours as needed. 12 tablet 0    insulin detemir U-100, Levemir, (LEVEMIR FLEXPEN) 100 unit/mL (3 mL) InPn pen Inject 14 Units into the skin every evening. 12 mL 3    isosorbide mononitrate (ISMO,MONOKET) 10 mg tablet Take 1 tablet (10 mg total) by mouth once daily. 90 tablet 2    multivitamin (THERAGRAN) per tablet Take 1 tablet by mouth once daily.      pen needle, diabetic (NOVOTWIST) 32 gauge x 1/5" Ndle Use 1 needle to inject insulin four times daily 400 each 2    pen needle, diabetic 32 gauge x 5/32" Ndle Use as directed 100 each 0    cilostazoL (PLETAL) 100 MG Tab Take 1 tablet (100 mg total) by mouth 2 (two) times daily. (Patient not taking: Reported on 6/9/2023) 60 tablet 11     No current facility-administered medications for this visit.     Facility-Administered Medications Ordered in Other Visits   Medication Dose Route Frequency Provider Last Rate Last Admin    0.9%  NaCl infusion   Intravenous Continuous Aime George  mL/hr at 10/27/22 0842 New Bag at 10/27/22 0842       Past Medical History:   Diagnosis Date    Anticoagulant long-term use     Asthma     Back pain     Bradycardia, unspecified 5/8/2019    The etiology of the bradycardic episode is unclear.  The have appear to be respiratory in origin (at least the 1st episode).  We will continue to monitor carefully.  We are awaiting evaluation by Cardiology.      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, with long-term current use of insulin 2020       Past Surgical History:   Procedure Laterality Date    ANTEGRADE NEPHROSTOGRAPHY Left 2019    Procedure: Nephrostogram - antegrade;  Surgeon: Robin Boyd MD;  [facility];  Service: Urology;  Laterality: Left;    BRONCHOSCOPY N/A 2019    Procedure: BRONCHOSCOPY;  Surgeon: Sean Ruano MD;  Location: [facility] OR [address] [place] [address];  Service: General;  Laterality: N/A;    CARDIAC CATHETERIZATION      CATARACT EXTRACTION      cataract extraction left eye      cataracts      CAUDAL EPIDURAL STEROID INJECTION N/A 2019    Procedure: Injection-steroid-epidural-caudal;  Surgeon: Dave Bentley Jr., MD;  Location: [facility]IN [facility];  Service: Pain Management;  Laterality: N/A;     SECTION, LOW TRANSVERSE      COLONOSCOPY N/A 2019    Procedure: COLONOSCOPY;  Surgeon: Al Alaniz MD;  Location: [facility] (2ND FLR);  Service: Endoscopy;  Laterality: N/A;    CORONARY ANGIOPLASTY      CYSTOGRAM N/A 2019    Procedure: CYSTOGRAM INTRAOP;  Surgeon: Robin Boyd MD;  [facility];  Service: Urology;  Laterality: N/A;  1 HOUR    CYSTOSCOPY W/ RETROGRADES Left 2019    Procedure: CYSTOSCOPY, WITH RETROGRADE PYELOGRAM;  Surgeon: Robin Boyd MD;  [facility];  Service: Urology;  Laterality: Left;  fluro    ENDOSCOPIC ULTRASOUND OF UPPER GASTROINTESTINAL TRACT N/A 6/15/2023    Procedure: ULTRASOUND, UPPER GI TRACT, ENDOSCOPIC;  Surgeon: Lisa Kim MD;  Location: [facility] ENDO (2ND FLR);  Service: Endoscopy;  Laterality: N/A;    ERCP N/A 6/15/2023    Procedure: ERCP (ENDOSCOPIC  RETROGRADE CHOLANGIOPANCREATOGRAPHY);  Surgeon: Lisa Kim MD;  Location: [facility] ENDO (2ND FLR);  Service: Endoscopy;  Laterality: N/A;    ESOPHAGOGASTRODUODENOSCOPY W/ PEG  5/2/2019    Procedure: EGD, WITH PEG TUBE INSERTION;  Surgeon: Sean Ruano MD;  Location: [facility] OR 2ND FLR;  Service: General;;    EXCISION TURBINATE, SUBMUCOUS      FLEXIBLE SIGMOIDOSCOPY N/A 5/13/2019    Procedure: SIGMOIDOSCOPY, FLEXIBLE;  Surgeon: ALBERTO Amin MD;  Location: [facility] ENDO (2ND FLR);  Service: Endoscopy;  Laterality: N/A;    FLEXIBLE SIGMOIDOSCOPY N/A 5/21/2019    Procedure: SIGMOIDOSCOPY, FLEXIBLE;  Surgeon: ALBERTO Amin MD;  Location: [facility] ENDO (2ND FLR);  Service: Endoscopy;  Laterality: N/A;    FUSION OF LUMBAR SPINE BY ANTERIOR APPROACH Left 4/12/2019    Procedure: FUSION, SPINE, LUMBAR, ANTERIOR APPROACH Left L5-S1 Anterior to Psoa Interbody Fusion, L5-S1 Posterior Instrumentation;  Surgeon: Mk George MD;  [address]  Service: Neurosurgery;  Laterality: Left;  Porcedure:  Left L5-S1 Anterior to Psoa Interbody Fusion, L5-S1 Posterior Instrumentation  Surgery Time: 4 Hrs  LOS: 2-3  Anesthesia: General   Blood: Type & Screen  R    HAND SURGERY Left     HAND SURGERY Right     torn ligament repair/ Dr. Yeboah    HYSTERECTOMY      INJECTION OF STEROID Right 12/10/2020    Procedure: INJECTION, STEROID Right SI Joint Block and Steroid Injection;  Surgeon: Mk George MD;  Location: [facility] OR;  Service: Neurosurgery;  Laterality: Right;  Procedure: Right SI Joint Block and Steroid Injection   SUrgery Time: 30 Min  LOS: 0  Anesthesia: MAC  Radiology: C-arm  Bed: [place] [id number] Poster  Position: Prone    INJECTION OF STEROID Right 9/28/2021    Procedure: INJECTION, STEROID Right SI joint block & steroid injection;  Surgeon: Mk George MD;  Location: [facility] OR;  Service: Neurosurgery;  Laterality: Right;  Procedure: Right SI joint block & steroid injection  Surgery Time: 30m  Anesthesia: Local  MAC  Radiology: C-arm  Bed: Regular  Position: Prone    LAPAROSCOPIC CHOLECYSTECTOMY N/A 6/23/2023    Procedure: CHOLECYSTECTOMY, LAPAROSCOPIC;  Surgeon: Richard Penny MD;  [address];  Service: General;  Laterality: N/A;    left foot surgery      left wrist surgery      LYSIS OF ADHESIONS N/A 2/19/2020    Procedure: LYSIS, ADHESIONS;  Surgeon: Robin Boyd MD;  Location: [facility] OR [address] [place] [address];  Service: Urology;  Laterality: N/A;    NASAL SEPTUM SURGERY  5/7/15    PERCUTANEOUS NEPHROSTOMY Left 4/21/2019    Procedure: Creation, Nephrostomy, Percutaneous;  Surgeon: Karina Surgeon;  Location: [facility];  Service: Anesthesiology;  Laterality: Left;    REPAIR OF URETER  4/12/2019    Procedure: REPAIR, URETER;  Surgeon: Mk George MD;  Location: [address];  Service: Neurosurgery;;    REPLACEMENT OF NEPHROSTOMY TUBE N/A 7/18/2019    Procedure: REPLACEMENT, NEPHROSTOMY TUBE;  Surgeon: [facility] Diagnostic Provider;  [address]  Service: Anesthesiology;  Laterality: N/A;  188    REPLACEMENT OF NEPHROSTOMY TUBE N/A 7/24/2019    Procedure: REPLACEMENT, NEPHROSTOMY TUBE;  Surgeon: Dos Diagnostic Provider;  Location: [facility] OR [address] [place] [address];  Service: Anesthesiology;  Laterality: N/A;  188    REPLACEMENT OF NEPHROSTOMY TUBE N/A 10/7/2019    Procedure: REPLACEMENT, NEPHROSTOMY TUBE;  Surgeon: Dos Diagnostic Provider;  [facility];  Service: Anesthesiology;  Laterality: N/A;  189    REPLACEMENT OF NEPHROSTOMY TUBE N/A 11/25/2019    Procedure: REPLACEMENT, NEPHROSTOMY TUBE;  Surgeon: Dos Diagnostic Provider;  [facility];  Service: Anesthesiology;  Laterality: N/A;  Room 188/[place]    REPLACEMENT OF NEPHROSTOMY TUBE Right 2/19/2020    Procedure: REPLACEMENT, NEPHROSTOMY TUBE removal removal;  Surgeon: Robin Boyd MD;  Location: [address];  Service: Urology;  Laterality: Right;    rt elbow surgery      S/P LAD COATED STENT  05/14/2010    6 total     S/P OM1 STENT  08/2003     SINUS SURGERY      F.E.S.S.    TRACHEOSTOMY N/A 5/2/2019    Procedure: CREATION, TRACHEOSTOMY;  Surgeon: Sean Ruano MD;  Location: [facility] OR [address] [place] [address];  Service: General;  Laterality: N/A;    TUBAL LIGATION      URETERAL REIMPLANTION Left 2/19/2020    Procedure: REIMPLANTATION, URETER WITH PSOAS HITCH;  Surgeon: Robin Boyd MD;  Location: [facility] OR [address] [place] [address];  Service: Urology;  Laterality: Left;       Family History   Problem Relation Age of Onset    Diabetes Mother     Heart disease Mother     Diabetes Father     Leukemia Father         leukemia    Heart attack Father     Diabetes Sister     Diabetes Brother     Diabetes Sister     No Known Problems Sister     No Known Problems Brother     No Known Problems Brother     No Known Problems Maternal Grandmother     No Known Problems Maternal Grandfather     No Known Problems Paternal Grandmother     No Known Problems Paternal Grandfather     No Known Problems Son     No Known Problems Son     No Known Problems Maternal Aunt     No Known Problems Maternal Uncle     No Known Problems Paternal Aunt     No Known Problems Paternal Uncle     Colon cancer Neg Hx     Inflammatory bowel disease Neg Hx     Melanoma Neg Hx     Psoriasis Neg Hx     Lupus Neg Hx     Eczema Neg Hx     Acne Neg Hx     Amblyopia Neg Hx     Blindness Neg Hx     Cancer Neg Hx     Cataracts Neg Hx     Glaucoma Neg Hx     Hypertension Neg Hx     Macular degeneration Neg Hx     Retinal detachment Neg Hx     Strabismus Neg Hx     Stroke Neg Hx     Thyroid disease Neg Hx     Heart failure Neg Hx     Hyperlipidemia Neg Hx      Review of Systems:  ROS:  Constitutional: no fever or chills  Eyes: no visual changes  ENT: no nasal congestion or sore throat  Respiratory: no cough or shortness of breath  Cardiovascular: no chest pain or palpitations  Gastrointestinal: no nausea or vomiting, tolerating diet  Genitourinary: no hematuria or dysuria  Integument/Breast: no rash or pruritis  Hematologic/Lymphatic:  "no easy bruising or lymphadenopathy  Musculoskeletal: left arm fracture  Neurological: no seizures or tremors  Behavioral/Psych: no auditory or visual hallucinations  Endocrine: no heat or cold intolerance      OBJECTIVE:     PHYSICAL EXAM:  Vital Signs (Most Recent)  There were no vitals filed for this visit.  Height: 5' 4" (162.6 cm) Weight: 63.5 kg (139 lb 15.9 oz),   Estimated body mass index is 24.03 kg/m² as calculated from the following:    Height as of this encounter: 5' 4" (1.626 m).    Weight as of this encounter: 63.5 kg (139 lb 15.9 oz).   General Appearance: Well nourished, well developed, in no acute distress.  HENT: Normal cephalic, oropharynx pink and moist  Eyes: PERRLA bilaterally and EOM x 4  Respiratory: Even and unlabored  Skin: Warm and Dry.   Psychiatric: AAO x 4, Mood & affect are normal.    Shoulder exam: left  Tenderness: upper arm  ROM: not tested  Shoulder Strength: not tested  non-specific diffuse tenderness about the shoulder, sensory exam normal, and radial pulse intact    RADIOGRAPHS:  X-ray of the left shoulder, left humerus, CT of the left shoulder dated June 30, 2023 was personally reviewed by me.  Patient has a displaced proximal humerus fracture.  No other fracture seen.  All radiographs were personally reviewed by me.    Xray of the left shoulder was obtained today after speaking with Dr. Leary.  She has a comminuted and displaced proximal right humeral fracture that appears to be similar to CT of her shoulder.    ASSESSMENT/PLAN:       ICD-10-CM ICD-9-CM   1. Closed fracture of proximal end of left humerus, unspecified fracture morphology, initial encounter  S42.202A 812.00     -Mariann TRISTIN Huff presents to clinic with c/c left proximal humerus fracture secondary to a fall on June 30, 2023.  Patient was recently discharged from the hospital for an acute pancreatitis resulting in removal of her gallbladder.  In review of her records she has an elevated alkaline phosphatase, " white blood cell count, decreased H&H, elevated liver enzymes, and renal impairment.  -X-ray as above.    I has this case with Dr. Leary.  He is recommending surgical intervention.  Given her complex medical history patient will need to be medically optimized by the perioperative clinic.  I sent a message to Dr. Marin, he will get her in to his clinic for surgical clearance.    Consents signed and case request placed.  Patient is currently on Eliquis and will need to hold her Eliquis for 3 days prior to surgery.  Planning for surgical fixation on July 11th.    Pre, rebecca, and post-operative procedure and expectations were discussed.  Questions were answered. The patient has been educated and is ready to proceed with surgery.  Approximately 30 minutes was spent discussing surgical outcomes, plans, procedures, pre, rebecca, and post-operative expectations and care. The risks, benefits and alternatives to surgery were discussed with the patient at great length.  These include bleeding, infection, vessel/nerve damage, pain, numbness, tingling, complex regional pain syndrome, hardware/surgical failure, need for further surgery, malunion, nonunion, DVT, PE, arthritis and death.     Mariann also understands that the risks of surgery may be greater for some patients due to health or lifestyle issues, such as a current condition or a history of heart disease, obesity, clotting disorders, recurrent infections, smoking, sedentary lifestyle, or noncompliance with medications, therapy, or follow-up. The degree of the increased risk is hard to estimate with any degree of precision.      Patient states an understanding and wishes to proceed with surgery.   All questions were answered.  No guarantees were implied or stated.  Informed consent was obtained.  The patient will contact us if they have any questions, concerns, and changes in their medical condition prior to surgery.

## 2023-07-03 NOTE — TELEPHONE ENCOUNTER
Refill Routing Note   Medication(s) are not appropriate for processing by [facility] for the following reason(s):      Medication outside of protocol    [facility] action(s):  Route Care Due:  None identified          Appointments  past 12m or future 3m with PCP    Date Provider   Last Visit   5/31/2023 Jasbir Haney MD   Next Visit   7/6/2023 Jasbir Haney MD   ED visits in past 90 days: 1        Note composed:10:41 AM 07/03/2023

## 2023-07-03 NOTE — H&P
SUBJECTIVE:     Chief Complaint & History of Present Illness:  Mariann Huff is a New 63 y.o. year old female patient presenting with constant left shoulder pain that started 6/30/23.  She is Right hand dominant.  There is a history of injury.  She reports she fell out the shower in between the shower door and onto the floor on her left arm.  She felt immediate pain to her left upper arm and was taken to the emergency department where she would x-rays and CT scans were taken.  Patient was found to have a proximal humerus fracture on the left.  Her  states the medical provider tried to reset the shoulder but was unable to do so.  She was placed in a sling and prescribed Lovilia and referred to Orthopedics.    Patient's spouse reports the patient was recently discharged from the hospital last week due to pancreatitis.  She underwent an ERCP where she was found to have bile duct obstruction.  She ended up having her gallbladder removed.  She has elevated liver enzymes as well as a decrease in her kidney function.  She is currently on Eliquis secondary to cardiac stents.  Her past medical history includes type 2 diabetes mellitus, encephalopathy, coronary artery disease, heart failure, peripheral arterial disease, bradycardia, chronic kidney disease stage 3, acute tubular necrosis, malnutrition, PFEIFFER, sleep apnea, DVT to right lower extremity.    Currently the patient reports pain to the left upper shoulder.  She is currently taking Norco 5/325 several times a day with little improvement in her pain.  She rates her pain at an 8/10.  Describes the pain as an achy throbbing pain.  She denies any numbness or tingling sensation down her arm.  Denies any prior injury to her left shoulder area.    Principle Orthopedic Problem    ICD-10-CM ICD-9-CM   1. Closed fracture of proximal end of left humerus, unspecified fracture morphology, initial encounter  S42.202A 812.00       Date of injury 6/30/23    Lives Home at  "home with Spouse             Independent community ambulator, no gait aids   Works as a disable   Does not use tobacco   Does have diabetes   Does have a history of CAD, Malnutrition, PFEIFFER, PAD, Heart Failure, CKD stage 3, Sleep apnea, acute tubular necrosis on Eliquis   Estimated body mass index is 24.03 kg/m² as calculated from the following:    Height as of this encounter: 5' 4" (1.626 m).    Weight as of this encounter: 63.5 kg (139 lb 15.9 oz).      Review of patient's allergies indicates:   Allergen Reactions    Milk containing products (dairy)      Causes GI distress         Current Outpatient Medications   Medication Sig Dispense Refill    ACCU-CHEK EDIN PLUS METER Misc       albuterol (PROVENTIL/VENTOLIN HFA) 90 mcg/actuation inhaler Inhale 2 puffs into the lungs every 6 (six) hours as needed for Wheezing. 1 g 3    amLODIPine (NORVASC) 10 MG tablet Take 1 tablet (10 mg total) by mouth once daily. 90 tablet 2    apixaban (ELIQUIS) 2.5 mg Tab Take 1 tablet (2.5 mg total) by mouth 2 (two) times daily. 180 tablet 1    aspirin 81 mg Tab Take 81 mg by mouth once daily. 1 Tablet Oral Every day      atorvastatin (LIPITOR) 40 MG tablet Take 1 tablet (40 mg total) by mouth every evening. 90 tablet 3    blood sugar diagnostic (ACCU-CHEK EDIN PLUS TEST STRP) Strp TEST BLOOD SUGARS 4 TIMES DAILY 150 strip 11    dapagliflozin (FARXIGA) 10 mg tablet Take 1 tablet (10 mg total) by mouth once daily. 90 tablet 3    diclofenac sodium (VOLTAREN) 1 % Gel Apply 2 g topically 3 (three) times daily. Apply to the area of pain 2-3x per day or night as needed 100 g 3    EScitalopram oxalate (LEXAPRO) 10 MG tablet Take 1 tablet (10 mg total) by mouth once daily. 90 tablet 2    evolocumab (REPATHA SURECLICK) 140 mg/mL PnIj Inject 1 mL (140 mg total) into the skin every 14 (fourteen) days. 2 mL 6    gabapentin (NEURONTIN) 300 MG capsule Take 1 capsule (300 mg) in the morning and 2 capsules (600 mg) in the evening 90 capsule 0    " "glipiZIDE (GLUCOTROL) 10 MG TR24 Take 1 tablet (10 mg total) by mouth daily with breakfast. 90 tablet 3    hydrALAZINE (APRESOLINE) 25 MG tablet Take 1 tablet (25 mg total) by mouth every 12 (twelve) hours. 60 tablet 2    HYDROcodone-acetaminophen (NORCO) 5-325 mg per tablet Take 1 tablet by mouth every 6 (six) hours as needed. 12 tablet 0    insulin detemir U-100, Levemir, (LEVEMIR FLEXPEN) 100 unit/mL (3 mL) InPn pen Inject 14 Units into the skin every evening. 12 mL 3    isosorbide mononitrate (ISMO,MONOKET) 10 mg tablet Take 1 tablet (10 mg total) by mouth once daily. 90 tablet 2    multivitamin (THERAGRAN) per tablet Take 1 tablet by mouth once daily.      pen needle, diabetic (NOVOTWIST) 32 gauge x 1/5" Ndle Use 1 needle to inject insulin four times daily 400 each 2    pen needle, diabetic 32 gauge x 5/32" Ndle Use as directed 100 each 0    cilostazoL (PLETAL) 100 MG Tab Take 1 tablet (100 mg total) by mouth 2 (two) times daily. (Patient not taking: Reported on 6/9/2023) 60 tablet 11     No current facility-administered medications for this visit.     Facility-Administered Medications Ordered in Other Visits   Medication Dose Route Frequency Provider Last Rate Last Admin    0.9%  NaCl infusion   Intravenous Continuous Aime George  mL/hr at 10/27/22 0842 New Bag at 10/27/22 0842       Past Medical History:   Diagnosis Date    Anticoagulant long-term use     Asthma     Back pain     Bradycardia, unspecified 5/8/2019    The etiology of the bradycardic episode is unclear.  The have appear to be respiratory in origin (at least the 1st episode).  We will continue to monitor carefully.  We are awaiting evaluation by Cardiology.      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, with long-term current use of insulin 2020       Past Surgical History:   Procedure Laterality Date    ANTEGRADE NEPHROSTOGRAPHY Left 2019    Procedure: Nephrostogram - antegrade;  Surgeon: Robin Boyd MD;  [facility];  Service: Urology;  Laterality: Left;    BRONCHOSCOPY N/A 2019    Procedure: BRONCHOSCOPY;  Surgeon: Sean Ruano MD;  Location: [facility] OR [address] [place] [address];  Service: General;  Laterality: N/A;    CARDIAC CATHETERIZATION      CATARACT EXTRACTION      cataract extraction left eye      cataracts      CAUDAL EPIDURAL STEROID INJECTION N/A 2019    Procedure: Injection-steroid-epidural-caudal;  Surgeon: Dave Bentley Jr., MD;  Location: [facility] PAIN MGT;  Service: Pain Management;  Laterality: N/A;     SECTION, LOW TRANSVERSE      COLONOSCOPY N/A 2019    Procedure: COLONOSCOPY;  Surgeon: Al Alaniz MD;  Location: [facility] (2ND FLR);  Service: Endoscopy;  Laterality: N/A;    CORONARY ANGIOPLASTY      CYSTOGRAM N/A 2019    Procedure: CYSTOGRAM INTRAOP;  Surgeon: Robin Boyd MD;  [facility];  Service: Urology;  Laterality: N/A;  1 HOUR    CYSTOSCOPY W/ RETROGRADES Left 2019    Procedure: CYSTOSCOPY, WITH RETROGRADE PYELOGRAM;  Surgeon: Robin Boyd MD;  [facility];  Service: Urology;  Laterality: Left;  fluro    ENDOSCOPIC ULTRASOUND OF UPPER GASTROINTESTINAL TRACT N/A 6/15/2023    Procedure: ULTRASOUND, UPPER GI TRACT, ENDOSCOPIC;  Surgeon: Lisa Kim MD;  Location: [facility] ENDO (2ND FLR);  Service: Endoscopy;  Laterality: N/A;    ERCP N/A 6/15/2023    Procedure: ERCP (ENDOSCOPIC  RETROGRADE CHOLANGIOPANCREATOGRAPHY);  Surgeon: Lisa Kim MD;  Location: [facility] ENDO (2ND FLR);  Service: Endoscopy;  Laterality: N/A;    ESOPHAGOGASTRODUODENOSCOPY W/ PEG  5/2/2019    Procedure: EGD, WITH PEG TUBE INSERTION;  Surgeon: Sean Ruano MD;  Location: [facility] OR 2ND FLR;  Service: General;;    EXCISION TURBINATE, SUBMUCOUS      FLEXIBLE SIGMOIDOSCOPY N/A 5/13/2019    Procedure: SIGMOIDOSCOPY, FLEXIBLE;  Surgeon: ALBERTO Amin MD;  Location: [facility] ENDO (2ND FLR);  Service: Endoscopy;  Laterality: N/A;    FLEXIBLE SIGMOIDOSCOPY N/A 5/21/2019    Procedure: SIGMOIDOSCOPY, FLEXIBLE;  Surgeon: ALBERTO Amin MD;  Location: [facility] ENDO (2ND FLR);  Service: Endoscopy;  Laterality: N/A;    FUSION OF LUMBAR SPINE BY ANTERIOR APPROACH Left 4/12/2019    Procedure: FUSION, SPINE, LUMBAR, ANTERIOR APPROACH Left L5-S1 Anterior to Psoa Interbody Fusion, L5-S1 Posterior Instrumentation;  Surgeon: Mk George MD;  [address]  Service: Neurosurgery;  Laterality: Left;  Porcedure:  Left L5-S1 Anterior to Psoa Interbody Fusion, L5-S1 Posterior Instrumentation  Surgery Time: 4 Hrs  LOS: 2-3  Anesthesia: General   Blood: Type & Screen  R    HAND SURGERY Left     HAND SURGERY Right     torn ligament repair/ Dr. Yeboah    HYSTERECTOMY      INJECTION OF STEROID Right 12/10/2020    Procedure: INJECTION, STEROID Right SI Joint Block and Steroid Injection;  Surgeon: Mk George MD;  Location: [facility] OR;  Service: Neurosurgery;  Laterality: Right;  Procedure: Right SI Joint Block and Steroid Injection   SUrgery Time: 30 Min  LOS: 0  Anesthesia: MAC  Radiology: C-arm  Bed: [place] [id number] Poster  Position: Prone    INJECTION OF STEROID Right 9/28/2021    Procedure: INJECTION, STEROID Right SI joint block & steroid injection;  Surgeon: Mk George MD;  Location: [facility] OR;  Service: Neurosurgery;  Laterality: Right;  Procedure: Right SI joint block & steroid injection  Surgery Time: 30m  Anesthesia: Local  MAC  Radiology: C-arm  Bed: Regular  Position: Prone    LAPAROSCOPIC CHOLECYSTECTOMY N/A 6/23/2023    Procedure: CHOLECYSTECTOMY, LAPAROSCOPIC;  Surgeon: Richard Penny MD;  [address]  Service: General;  Laterality: N/A;    left foot surgery      left wrist surgery      LYSIS OF ADHESIONS N/A 2/19/2020    Procedure: LYSIS, ADHESIONS;  Surgeon: Robin Boyd MD;  Location: [facility] OR [address] [place] [address];  Service: Urology;  Laterality: N/A;    NASAL SEPTUM SURGERY  5/7/15    PERCUTANEOUS NEPHROSTOMY Left 4/21/2019    Procedure: Creation, Nephrostomy, Percutaneous;  Surgeon: Karina Surgeon;  Location: [facility];  Service: Anesthesiology;  Laterality: Left;    REPAIR OF URETER  4/12/2019    Procedure: REPAIR, URETER;  Surgeon: Mk George MD;  Location: [address];  Service: Neurosurgery;;    REPLACEMENT OF NEPHROSTOMY TUBE N/A 7/18/2019    Procedure: REPLACEMENT, NEPHROSTOMY TUBE;  Surgeon: [facility] Diagnostic Provider;  [address]  Service: Anesthesiology;  Laterality: N/A;  188    REPLACEMENT OF NEPHROSTOMY TUBE N/A 7/24/2019    Procedure: REPLACEMENT, NEPHROSTOMY TUBE;  Surgeon: [facility] Diagnostic Provider;  Location: [facility] OR [address] [place] [address];  Service: Anesthesiology;  Laterality: N/A;  188    REPLACEMENT OF NEPHROSTOMY TUBE N/A 10/7/2019    Procedure: REPLACEMENT, NEPHROSTOMY TUBE;  Surgeon: Dos Diagnostic Provider;  [facility];  Service: Anesthesiology;  Laterality: N/A;  189    REPLACEMENT OF NEPHROSTOMY TUBE N/A 11/25/2019    Procedure: REPLACEMENT, NEPHROSTOMY TUBE;  Surgeon: [facility] Diagnostic Provider;  [facility];  Service: Anesthesiology;  Laterality: N/A;  Room 188/Bessy    REPLACEMENT OF NEPHROSTOMY TUBE Right 2/19/2020    Procedure: REPLACEMENT, NEPHROSTOMY TUBE removal removal;  Surgeon: Robin Boyd MD;  Location: [address];  Service: Urology;  Laterality: Right;    rt elbow surgery      S/P LAD COATED STENT  05/14/2010    6 total     S/P OM1 STENT  08/2003     SINUS SURGERY      F.E.S.S.    TRACHEOSTOMY N/A 5/2/2019    Procedure: CREATION, TRACHEOSTOMY;  Surgeon: Sean Ruano MD;  Location: [facility] OR [address] [place] [address];  Service: General;  Laterality: N/A;    TUBAL LIGATION      URETERAL REIMPLANTION Left 2/19/2020    Procedure: REIMPLANTATION, URETER WITH PSOAS HITCH;  Surgeon: Robin Boyd MD;  Location: [facility] OR [address] [place] [address];  Service: Urology;  Laterality: Left;       Family History   Problem Relation Age of Onset    Diabetes Mother     Heart disease Mother     Diabetes Father     Leukemia Father         leukemia    Heart attack Father     Diabetes Sister     Diabetes Brother     Diabetes Sister     No Known Problems Sister     No Known Problems Brother     No Known Problems Brother     No Known Problems Maternal Grandmother     No Known Problems Maternal Grandfather     No Known Problems Paternal Grandmother     No Known Problems Paternal Grandfather     No Known Problems Son     No Known Problems Son     No Known Problems Maternal Aunt     No Known Problems Maternal Uncle     No Known Problems Paternal Aunt     No Known Problems Paternal Uncle     Colon cancer Neg Hx     Inflammatory bowel disease Neg Hx     Melanoma Neg Hx     Psoriasis Neg Hx     Lupus Neg Hx     Eczema Neg Hx     Acne Neg Hx     Amblyopia Neg Hx     Blindness Neg Hx     Cancer Neg Hx     Cataracts Neg Hx     Glaucoma Neg Hx     Hypertension Neg Hx     Macular degeneration Neg Hx     Retinal detachment Neg Hx     Strabismus Neg Hx     Stroke Neg Hx     Thyroid disease Neg Hx     Heart failure Neg Hx     Hyperlipidemia Neg Hx      Review of Systems:  ROS:  Constitutional: no fever or chills  Eyes: no visual changes  ENT: no nasal congestion or sore throat  Respiratory: no cough or shortness of breath  Cardiovascular: no chest pain or palpitations  Gastrointestinal: no nausea or vomiting, tolerating diet  Genitourinary: no hematuria or dysuria  Integument/Breast: no rash or pruritis  Hematologic/Lymphatic:  "no easy bruising or lymphadenopathy  Musculoskeletal: left arm fracture  Neurological: no seizures or tremors  Behavioral/Psych: no auditory or visual hallucinations  Endocrine: no heat or cold intolerance      OBJECTIVE:     PHYSICAL EXAM:  Vital Signs (Most Recent)  There were no vitals filed for this visit.  Height: 5' 4" (162.6 cm) Weight: 63.5 kg (139 lb 15.9 oz),   Estimated body mass index is 24.03 kg/m² as calculated from the following:    Height as of this encounter: 5' 4" (1.626 m).    Weight as of this encounter: 63.5 kg (139 lb 15.9 oz).   General Appearance: Well nourished, well developed, in no acute distress.  HENT: Normal cephalic, oropharynx pink and moist  Eyes: PERRLA bilaterally and EOM x 4  Respiratory: Even and unlabored  Skin: Warm and Dry.   Psychiatric: AAO x 4, Mood & affect are normal.    Shoulder exam: left  Tenderness: upper arm  ROM: not tested  Shoulder Strength: not tested  non-specific diffuse tenderness about the shoulder, sensory exam normal, and radial pulse intact    RADIOGRAPHS:  X-ray of the left shoulder, left humerus, CT of the left shoulder dated June 30, 2023 was personally reviewed by me.  Patient has a displaced proximal humerus fracture.  No other fracture seen.  All radiographs were personally reviewed by me.    Xray of the left shoulder was obtained today after speaking with Dr. Leary.  She has a comminuted and displaced proximal right humeral fracture that appears to be similar to CT of her shoulder.    ASSESSMENT/PLAN:       ICD-10-CM ICD-9-CM   1. Closed fracture of proximal end of left humerus, unspecified fracture morphology, initial encounter  S42.202A 812.00     -Mariann TRISTIN Huff presents to clinic with c/c left proximal humerus fracture secondary to a fall on June 30, 2023.  Patient was recently discharged from the hospital for an acute pancreatitis resulting in removal of her gallbladder.  In review of her records she has an elevated alkaline phosphatase, " white blood cell count, decreased H&H, elevated liver enzymes, and renal impairment.  -X-ray as above.    I has this case with Dr. Leary.  He is recommending surgical intervention.  Given her complex medical history patient will need to be medically optimized by the perioperative clinic.  I sent a message to Dr. Marin, he will get her in to his clinic for surgical clearance.    Consents signed and case request placed.  Patient is currently on Eliquis and will need to hold her Eliquis for 3 days prior to surgery.  Planning for surgical fixation on July 11th.    Pre, rebecca, and post-operative procedure and expectations were discussed.  Questions were answered. The patient has been educated and is ready to proceed with surgery.  Approximately 30 minutes was spent discussing surgical outcomes, plans, procedures, pre, rebecca, and post-operative expectations and care. The risks, benefits and alternatives to surgery were discussed with the patient at great length.  These include bleeding, infection, vessel/nerve damage, pain, numbness, tingling, complex regional pain syndrome, hardware/surgical failure, need for further surgery, malunion, nonunion, DVT, PE, arthritis and death.     Mariann also understands that the risks of surgery may be greater for some patients due to health or lifestyle issues, such as a current condition or a history of heart disease, obesity, clotting disorders, recurrent infections, smoking, sedentary lifestyle, or noncompliance with medications, therapy, or follow-up. The degree of the increased risk is hard to estimate with any degree of precision.      Patient states an understanding and wishes to proceed with surgery.   All questions were answered.  No guarantees were implied or stated.  Informed consent was obtained.  The patient will contact us if they have any questions, concerns, and changes in their medical condition prior to surgery.

## 2023-07-05 ENCOUNTER — PES CALL (OUTPATIENT)
Dept: ADMINISTRATIVE | Facility: CLINIC | Age: 63
End: 2023-07-05
Payer: COMMERCIAL

## 2023-07-05 ENCOUNTER — TELEPHONE (OUTPATIENT)
Dept: INTERNAL MEDICINE | Facility: CLINIC | Age: 63
End: 2023-07-05
Payer: COMMERCIAL

## 2023-07-05 LAB
BACTERIA BLD CULT: NORMAL
BACTERIA BLD CULT: NORMAL

## 2023-07-05 NOTE — TELEPHONE ENCOUNTER
Left message with back line # to contact me regarding an earlier appointment per surgeons request. Will try again until contacted. Chris

## 2023-07-06 ENCOUNTER — TELEPHONE (OUTPATIENT)
Dept: INTERNAL MEDICINE | Facility: CLINIC | Age: 63
End: 2023-07-06
Payer: COMMERCIAL

## 2023-07-06 ENCOUNTER — TELEPHONE (OUTPATIENT)
Dept: SURGERY | Facility: CLINIC | Age: 63
End: 2023-07-06
Payer: COMMERCIAL

## 2023-07-06 NOTE — TELEPHONE ENCOUNTER
Left messages on both her and her husbands' phone to contact me in regards to rescheduling her appointment with us. Back line # given again.

## 2023-07-07 ENCOUNTER — TELEPHONE (OUTPATIENT)
Dept: ORTHOPEDICS | Facility: CLINIC | Age: 63
End: 2023-07-07
Payer: COMMERCIAL

## 2023-07-07 NOTE — TELEPHONE ENCOUNTER
Unable to reach pt.  Spoke with pt's  who states that pt is taking a nap.  Advised  that pt's surgery has been moved from Tuesday to Wednesday to allow an additional day in case pre op department needs additional testing to clear pt.  Confirmed pt's appointment with Dr Marin on Monday at 12:00 pm.   verbalized understanding.

## 2023-07-08 ENCOUNTER — TELEPHONE (OUTPATIENT)
Dept: ENDOSCOPY | Facility: HOSPITAL | Age: 63
End: 2023-07-08
Payer: COMMERCIAL

## 2023-07-10 ENCOUNTER — LAB VISIT (OUTPATIENT)
Dept: LAB | Facility: HOSPITAL | Age: 63
End: 2023-07-10
Attending: HOSPITALIST
Payer: COMMERCIAL

## 2023-07-10 ENCOUNTER — OFFICE VISIT (OUTPATIENT)
Dept: INTERNAL MEDICINE | Facility: CLINIC | Age: 63
End: 2023-07-10
Payer: COMMERCIAL

## 2023-07-10 VITALS
BODY MASS INDEX: 23.9 KG/M2 | DIASTOLIC BLOOD PRESSURE: 65 MMHG | RESPIRATION RATE: 14 BRPM | HEIGHT: 64 IN | SYSTOLIC BLOOD PRESSURE: 112 MMHG | TEMPERATURE: 98 F | WEIGHT: 140 LBS | HEART RATE: 69 BPM | OXYGEN SATURATION: 96 %

## 2023-07-10 DIAGNOSIS — I15.2 HYPERTENSION ASSOCIATED WITH DIABETES: Chronic | ICD-10-CM

## 2023-07-10 DIAGNOSIS — Z86.718 HISTORY OF DVT (DEEP VEIN THROMBOSIS): ICD-10-CM

## 2023-07-10 DIAGNOSIS — E11.59 HYPERTENSION ASSOCIATED WITH DIABETES: Chronic | ICD-10-CM

## 2023-07-10 DIAGNOSIS — Z90.710 HISTORY OF HYSTERECTOMY: ICD-10-CM

## 2023-07-10 DIAGNOSIS — N28.9 RENAL IMPAIRMENT: ICD-10-CM

## 2023-07-10 DIAGNOSIS — J45.909 ASTHMA, UNSPECIFIED ASTHMA SEVERITY, UNSPECIFIED WHETHER COMPLICATED, UNSPECIFIED WHETHER PERSISTENT: Chronic | ICD-10-CM

## 2023-07-10 DIAGNOSIS — Z01.818 PREOP EXAMINATION: Primary | ICD-10-CM

## 2023-07-10 DIAGNOSIS — K76.0 FATTY LIVER DISEASE, NONALCOHOLIC: ICD-10-CM

## 2023-07-10 DIAGNOSIS — G47.30 SLEEP APNEA, UNSPECIFIED TYPE: Chronic | ICD-10-CM

## 2023-07-10 DIAGNOSIS — Z98.890 HISTORY OF TRACHEOSTOMY: ICD-10-CM

## 2023-07-10 DIAGNOSIS — I65.23 CAROTID STENOSIS, BILATERAL: Chronic | ICD-10-CM

## 2023-07-10 DIAGNOSIS — G89.29 OTHER CHRONIC PAIN: ICD-10-CM

## 2023-07-10 DIAGNOSIS — Z86.16 HISTORY OF COVID-19: ICD-10-CM

## 2023-07-10 DIAGNOSIS — I25.10 CORONARY ARTERY DISEASE INVOLVING NATIVE CORONARY ARTERY OF NATIVE HEART WITHOUT ANGINA PECTORIS: Chronic | ICD-10-CM

## 2023-07-10 DIAGNOSIS — H52.7 REFRACTIVE ERROR: ICD-10-CM

## 2023-07-10 DIAGNOSIS — M47.812 CERVICAL ARTHRITIS: Chronic | ICD-10-CM

## 2023-07-10 PROBLEM — E11.00 HYPEROSMOLAR HYPERGLYCEMIC STATE (HHS): Status: RESOLVED | Noted: 2023-06-20 | Resolved: 2023-07-10

## 2023-07-10 PROBLEM — E87.5 HYPERKALEMIA: Status: RESOLVED | Noted: 2023-06-20 | Resolved: 2023-07-10

## 2023-07-10 PROBLEM — K85.90 ACUTE RECURRENT PANCREATITIS: Status: RESOLVED | Noted: 2023-05-25 | Resolved: 2023-07-10

## 2023-07-10 PROBLEM — G93.40 ACUTE ENCEPHALOPATHY: Status: RESOLVED | Noted: 2019-04-20 | Resolved: 2023-07-10

## 2023-07-10 PROBLEM — R10.9 LEFT FLANK PAIN: Status: RESOLVED | Noted: 2021-02-09 | Resolved: 2023-07-10

## 2023-07-10 PROBLEM — E88.09 SERUM ALBUMIN DECREASED: Status: RESOLVED | Noted: 2019-11-01 | Resolved: 2023-07-10

## 2023-07-10 PROBLEM — E87.6 HYPOKALEMIA: Status: RESOLVED | Noted: 2023-06-16 | Resolved: 2023-07-10

## 2023-07-10 PROBLEM — I73.9 CLAUDICATION OF BOTH LOWER EXTREMITIES: Status: RESOLVED | Noted: 2022-05-04 | Resolved: 2023-07-10

## 2023-07-10 LAB
ALBUMIN SERPL BCP-MCNC: 2 G/DL (ref 3.5–5.2)
ALP SERPL-CCNC: 1309 U/L (ref 55–135)
ALT SERPL W/O P-5'-P-CCNC: 77 U/L (ref 10–44)
ANION GAP SERPL CALC-SCNC: 15 MMOL/L (ref 8–16)
AST SERPL-CCNC: 118 U/L (ref 10–40)
BASOPHILS # BLD AUTO: 0.04 K/UL (ref 0–0.2)
BASOPHILS NFR BLD: 0.3 % (ref 0–1.9)
BILIRUB SERPL-MCNC: 0.9 MG/DL (ref 0.1–1)
BUN SERPL-MCNC: 24 MG/DL (ref 8–23)
CALCIUM SERPL-MCNC: 10.2 MG/DL (ref 8.7–10.5)
CHLORIDE SERPL-SCNC: 95 MMOL/L (ref 95–110)
CO2 SERPL-SCNC: 27 MMOL/L (ref 23–29)
CREAT SERPL-MCNC: 1.1 MG/DL (ref 0.5–1.4)
DIFFERENTIAL METHOD: ABNORMAL
EOSINOPHIL # BLD AUTO: 0.1 K/UL (ref 0–0.5)
EOSINOPHIL NFR BLD: 1.2 % (ref 0–8)
ERYTHROCYTE [DISTWIDTH] IN BLOOD BY AUTOMATED COUNT: 15.7 % (ref 11.5–14.5)
EST. GFR  (NO RACE VARIABLE): 56.8 ML/MIN/1.73 M^2
ESTIMATED AVG GLUCOSE: 206 MG/DL (ref 68–131)
GLUCOSE SERPL-MCNC: 156 MG/DL (ref 70–110)
HBA1C MFR BLD: 8.8 % (ref 4–5.6)
HCT VFR BLD AUTO: 28.2 % (ref 37–48.5)
HGB BLD-MCNC: 8.4 G/DL (ref 12–16)
IMM GRANULOCYTES # BLD AUTO: 0.14 K/UL (ref 0–0.04)
IMM GRANULOCYTES NFR BLD AUTO: 1.2 % (ref 0–0.5)
LYMPHOCYTES # BLD AUTO: 2.2 K/UL (ref 1–4.8)
LYMPHOCYTES NFR BLD: 18.5 % (ref 18–48)
MCH RBC QN AUTO: 25.5 PG (ref 27–31)
MCHC RBC AUTO-ENTMCNC: 29.8 G/DL (ref 32–36)
MCV RBC AUTO: 86 FL (ref 82–98)
MONOCYTES # BLD AUTO: 1.1 K/UL (ref 0.3–1)
MONOCYTES NFR BLD: 9.5 % (ref 4–15)
NEUTROPHILS # BLD AUTO: 8.2 K/UL (ref 1.8–7.7)
NEUTROPHILS NFR BLD: 69.3 % (ref 38–73)
NRBC BLD-RTO: 0 /100 WBC
PLATELET # BLD AUTO: 493 K/UL (ref 150–450)
PMV BLD AUTO: 11.5 FL (ref 9.2–12.9)
POTASSIUM SERPL-SCNC: 4.1 MMOL/L (ref 3.5–5.1)
PROT SERPL-MCNC: 9 G/DL (ref 6–8.4)
RBC # BLD AUTO: 3.29 M/UL (ref 4–5.4)
SODIUM SERPL-SCNC: 137 MMOL/L (ref 136–145)
WBC # BLD AUTO: 11.79 K/UL (ref 3.9–12.7)

## 2023-07-10 PROCEDURE — 3074F SYST BP LT 130 MM HG: CPT | Mod: CPTII,S$GLB,, | Performed by: HOSPITALIST

## 2023-07-10 PROCEDURE — 3052F PR MOST RECENT HEMOGLOBIN A1C LEVEL 8.0 - < 9.0%: ICD-10-PCS | Mod: CPTII,S$GLB,, | Performed by: HOSPITALIST

## 2023-07-10 PROCEDURE — 99214 OFFICE O/P EST MOD 30 MIN: CPT | Mod: S$GLB,,, | Performed by: HOSPITALIST

## 2023-07-10 PROCEDURE — 4010F ACE/ARB THERAPY RXD/TAKEN: CPT | Mod: CPTII,S$GLB,, | Performed by: HOSPITALIST

## 2023-07-10 PROCEDURE — 3008F PR BODY MASS INDEX (BMI) DOCUMENTED: ICD-10-PCS | Mod: CPTII,S$GLB,, | Performed by: HOSPITALIST

## 2023-07-10 PROCEDURE — 85025 COMPLETE CBC W/AUTO DIFF WBC: CPT | Performed by: HOSPITALIST

## 2023-07-10 PROCEDURE — 3074F PR MOST RECENT SYSTOLIC BLOOD PRESSURE < 130 MM HG: ICD-10-PCS | Mod: CPTII,S$GLB,, | Performed by: HOSPITALIST

## 2023-07-10 PROCEDURE — 99999 PR PBB SHADOW E&M-EST. PATIENT-LVL V: CPT | Mod: PBBFAC,,, | Performed by: HOSPITALIST

## 2023-07-10 PROCEDURE — 3066F NEPHROPATHY DOC TX: CPT | Mod: CPTII,S$GLB,, | Performed by: HOSPITALIST

## 2023-07-10 PROCEDURE — 3078F DIAST BP <80 MM HG: CPT | Mod: CPTII,S$GLB,, | Performed by: HOSPITALIST

## 2023-07-10 PROCEDURE — 1159F PR MEDICATION LIST DOCUMENTED IN MEDICAL RECORD: ICD-10-PCS | Mod: CPTII,S$GLB,, | Performed by: HOSPITALIST

## 2023-07-10 PROCEDURE — 80053 COMPREHEN METABOLIC PANEL: CPT | Performed by: HOSPITALIST

## 2023-07-10 PROCEDURE — 1159F MED LIST DOCD IN RCRD: CPT | Mod: CPTII,S$GLB,, | Performed by: HOSPITALIST

## 2023-07-10 PROCEDURE — 99999 PR PBB SHADOW E&M-EST. PATIENT-LVL V: ICD-10-PCS | Mod: PBBFAC,,, | Performed by: HOSPITALIST

## 2023-07-10 PROCEDURE — 99214 PR OFFICE/OUTPT VISIT, EST, LEVL IV, 30-39 MIN: ICD-10-PCS | Mod: S$GLB,,, | Performed by: HOSPITALIST

## 2023-07-10 PROCEDURE — 3066F PR DOCUMENTATION OF TREATMENT FOR NEPHROPATHY: ICD-10-PCS | Mod: CPTII,S$GLB,, | Performed by: HOSPITALIST

## 2023-07-10 PROCEDURE — 3008F BODY MASS INDEX DOCD: CPT | Mod: CPTII,S$GLB,, | Performed by: HOSPITALIST

## 2023-07-10 PROCEDURE — 1160F PR REVIEW ALL MEDS BY PRESCRIBER/CLIN PHARMACIST DOCUMENTED: ICD-10-PCS | Mod: CPTII,S$GLB,, | Performed by: HOSPITALIST

## 2023-07-10 PROCEDURE — 36415 COLL VENOUS BLD VENIPUNCTURE: CPT | Performed by: HOSPITALIST

## 2023-07-10 PROCEDURE — 83036 HEMOGLOBIN GLYCOSYLATED A1C: CPT | Performed by: HOSPITALIST

## 2023-07-10 PROCEDURE — 4010F PR ACE/ARB THEARPY RXD/TAKEN: ICD-10-PCS | Mod: CPTII,S$GLB,, | Performed by: HOSPITALIST

## 2023-07-10 PROCEDURE — 3061F NEG MICROALBUMINURIA REV: CPT | Mod: CPTII,S$GLB,, | Performed by: HOSPITALIST

## 2023-07-10 PROCEDURE — 3078F PR MOST RECENT DIASTOLIC BLOOD PRESSURE < 80 MM HG: ICD-10-PCS | Mod: CPTII,S$GLB,, | Performed by: HOSPITALIST

## 2023-07-10 PROCEDURE — 3061F PR NEG MICROALBUMINURIA RESULT DOCUMENTED/REVIEW: ICD-10-PCS | Mod: CPTII,S$GLB,, | Performed by: HOSPITALIST

## 2023-07-10 PROCEDURE — 1160F RVW MEDS BY RX/DR IN RCRD: CPT | Mod: CPTII,S$GLB,, | Performed by: HOSPITALIST

## 2023-07-10 PROCEDURE — 3052F HG A1C>EQUAL 8.0%<EQUAL 9.0%: CPT | Mod: CPTII,S$GLB,, | Performed by: HOSPITALIST

## 2023-07-10 RX ORDER — METOPROLOL TARTRATE 50 MG/1
50 TABLET ORAL 2 TIMES DAILY
Status: ON HOLD | COMMUNITY
End: 2023-08-07 | Stop reason: HOSPADM

## 2023-07-10 NOTE — HPI
History of present illness- I had the pleasure of meeting this pleasant 63 y.o. lady in the pre op clinic prior to her elective Orthopedic surgery. The patient is new to me . Ms Waite was accompanied by  Mr Barksdale.    I have obtained the history by speaking to the patient and by reviewing the electronic health records.    Events leading up to surgery / History of presenting illness -    He she fell June 30th leading to the left humerus fracture   She was in the bathroom at that time had  shower trying to dry off when she fell  She could not recollect the events leading to the fall  She felt some dizziness prior to the fall  No heart racing, chest pain or chest tightness, short of breath or tongue bite   No one-sided weakness    Her  came home and took her to the emergency room   She was on the floor for about 5-10 minutes  As per the  she had some altitude awareness when he picked her up  In the emergency room she was found to have fractures and was given a  sling  Her fracture pain seems to be controlled    She has no osteoporosis or osteopenia    As per chart- proximal humerus fracture on the left      Relevant health conditions of significance for the perioperative period/ History of presenting illness -    Health conditions of significance for the perioperative period      - HTN  - Diabetes  - CHF   - CAD-  -Had gall bladder removed on 6/24- pancreatitis   - Sleep apnea   - Anticoagulation  - History of DVT   -History of Asthma, COVID    She lives with her  in a single-level house  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

## 2023-07-10 NOTE — ASSESSMENT & PLAN NOTE
She is doing good from an asthma standpoint and not needing the albuterol inhaler much    asthma is controlled . I suggest consideration of inhaled bronchodilator use if the patient has perioperative bronchospasm

## 2023-07-10 NOTE — ASSESSMENT & PLAN NOTE
2019 - Leg     She is in the hospital for long time at that time that led to the blood clot   She was hospitalized for a prolonged period of time at that time in the setting of spine surgery ureteral injury    History of DVT  No /PE  1 Episode  Associated with reduced mobility, no long journeys,no  Cancer,no  prior surgery, Hospital stay, no HRT  Anticoagulated since then   IVC filter - yes - removed     2019     1. Persistent occlusive deep vein thrombosis of the left axillary and basilic veins.  2. Superficial thrombophlebitis of the right cephalic vein.  3. Central venous catheter identified extending from the right basilic vein into the right axillary vein.    2019       1. Extension of known thrombus within the right lower extremity veins, now extending from the common femoral vein into the superficial femoral, popliteal, posterior tibial, and peroneal veins.  2. Partially occlusive thrombus within the left common femoral vein extending into the proximal superficial femoral vein, not present on the most recent prior ultrasound.  3. Persistent thrombosis of one of the paired left anterior tibial veins    She is on indefinite anticoagulation  She last took Eliquis on Saturday July the 8 in preparation for surgery on July the total

## 2023-07-10 NOTE — ASSESSMENT & PLAN NOTE
No alcohol excess  A most recent APTT, INR normal    She had gallbladder removal on June 23rd    No history  of cirrhosis of liver or suggestions of Liver  decompensation   No Jaundice , dark urine , pale stool       Suggest care with usage of Medication that are hepatotoxic or  Metabolized in the liver     She has recovered from the gallbladder surgery

## 2023-07-10 NOTE — ASSESSMENT & PLAN NOTE
Sleep apnea  Not using  CPAP/BIPAP  . Informed the risk of worsening sleep apnea in the perioperative period       Possible sleep apnea- I suggest a sleep study and suggest caution with usage of medication that can cause respiratory suppression in the perioperative period  potential ramifications of untreated sleep apnea, which could include daytime sleepiness, hypertension, heart disease and stroke were discussed    Avoidance of  supine sleep, weight gain , alcoholic beverages , care with , sedative , CNS depressant use indicated  since all of these can worsen MURTAZA

## 2023-07-10 NOTE — ASSESSMENT & PLAN NOTE
LAD GINGER prox and distal, unable to cross 80% OM1 lesion.  Old OM1 stent in 2003, LAD stent in 2010    No chest pain or heart attack preceding the stenting  She had heart attack prior  In 2013- was SOB    Stress test 2018    There is no evidence of a discrete hemodynamically significant coronary stenosis    Doing good   Was at an active person prior to the fall  She was able to take a flight of stairs at her son's place prior to the fall    With she has no exertional symptoms    She seems to be doing good from a heart standpoint    Is factors for heart disease  Diabetes   Hypertension  Hyperlipidemia  She was a nonsmoker    As secondary prevention   Aspirin   Statin    ASA - with history of  Stenting.  I have discussed the  risk of stent thrombosis with interruption of Aspirin regimen .Risk ( bleeding ) ,  benefits about perioperative use of  ASA  discussed    To her understanding she can stay on aspirin for surgery  She feels that she is doing good from a heart standpoint    May 2023     The estimated ejection fraction is 55%.   The quantitatively derived ejection fraction is 52%.   Normal right ventricular size with normal right ventricular systolic function.   Normal left ventricular diastolic function.   The left ventricle is normal in size with normal systolic function.   Normal central venous pressure (3 mmHg).

## 2023-07-10 NOTE — ASSESSMENT & PLAN NOTE
2019     - I suggest that the perioperative team be aware of this so that appropriate preventive care can be taken

## 2023-07-10 NOTE — ASSESSMENT & PLAN NOTE
She is currently taking metoprolol 2 times a day, amlodipine and hydralazine   Her medication list does not contain metoprolol   I suggested for her to call me with the dosage of the medication and the name of the medications so that it  can be added to the medication list    Home BP readings -126/60's   Recent BP readings in the record-  Vitals - 1 value per visit 7/10/2023   SYSTOLIC 112   DIASTOLIC 65   Hypertension-  Blood pressure is acceptable . I suggest continuation of -metoprolol, amlodipine, hydralazine------- during the entire perioperative period. I suggest  blood pressure, electrolyte and renal function monitoring .I suggest addressing pain control as uncontrolled pain can increased blood pressure     Type 2  Diabetes Mellitus  On treatment with oral agents, Insulin    Hemoglobin A1c- 8.1 May 25 th 2023    Capillary glucose check   - Fluctuant       Diabetes Complications     Microvascular     Not known to have   Diabetes affecting the eyes, Kidneys   No tingling numbness of hands and feet  No reported open areas on the feet   Feet care suggested     Macrovascular     No stroke/ TIA   known to have CAD  No suggestion of  lower extremity claudication      Diabetes Mellitus-I suggest monitoring the glucose in the perioperative period ( Before meals and bed time,if the patient is on oral feeds or every 6 hourly ,if the patient is NPO )  Blood glucose target in hospitalized patients is 140-180. Oral Hypoglycemic agents are generally avoided during the hospital stay . If glucose is consistently elevated ,I suggest using basal ,prandial Insulin regimen to control the glucose , as elevated glucose can be associated with adverse surgical out comes. Please consider involving Hospital Medicine or Endocrinology ,if any help is needed with Glucose control. Patient will be instructed based on the pre op clinic guidelines  about adjustment of diabetic treatment (If applicable )  considering the NPO status for Surgery       I had educated that uncontrolled DM can cause post op complications,risk of infection, wound healing problem,increased length of stay in hospital and its associated complications.I suggest exercise as much as possible and follow diabetic diet     I have provided med insulin instructions

## 2023-07-10 NOTE — OUTPATIENT SUBJECTIVE & OBJECTIVE
"Outpatient Subjective & Objective     Chief complaint-Preoperative evaluation, Perioperative Medical management, complication reduction plan     Active cardiac conditions- none    Revised cardiac risk index predictors- coronary artery disease and insulin requiring diabetes mellitus    Functional capacity -Examples of physical activity  can take a flight of stairs holding on to the railing----- She can undertake all the above activities without  chest pain,chest tightness, Shortness of breath ,dizziness,lightheadedness making her exercise tolerance more, than 4 Mets.       Review of Systems   Constitutional:  Negative for chills and fever.        No unusual weight changes     HENT:          Sleep apnea   Eyes:         No new visual changes   Respiratory:          No cough , phlegm    No Hemoptysis   Cardiovascular:         As noted   Gastrointestinal:         No overt GI/ blood losses  Bowel movements- Regular   Endocrine:        Prednisone use > 20 mg daily for 3 weeks- none   Genitourinary:  Negative for dysuria.        No urinary hesitancy    Musculoskeletal:         As above      Skin:  Negative for rash.   Neurological:  Negative for syncope.        No unilateral weakness   Hematological:         Current use of Anticoagulants  Yes  She is on indefinite anticoagulation   Psychiatric/Behavioral:          No Depression,Anxiety         Suggested follow-up regarding abnormal CT angiogram    No anesthesia, bleeding, cardiac problems, PONV with previous surgeries/procedures.  Medications and Allergies reviewed in epic.   FH- No anesthesia,bleeding / venous thrombosis , heart disease in family      Physical Exam  Blood pressure 112/65, pulse 69, temperature 97.8 °F (36.6 °C), temperature source Oral, resp. rate 14, height 5' 4" (1.626 m), weight 63.5 kg (140 lb), SpO2 96 %.  I offered a blanket        Physical Exam  Constitutional- Vitals - Body mass index is 24.03 kg/m².,   Vitals:    07/10/23 1209   BP: 112/65 "   Pulse: 69   Resp: 14   Temp: 97.8 °F (36.6 °C)     General appearance-Conscious,Coherent  Eyes- No conjunctival icterus,pupils  round   ENT-Oral cavity- moist    , Hearing grossly normal   Neck- No thyromegaly ,Trachea -central, No jugular venous distension,   No Carotid Bruit   Cardiovascular -Heart Sounds- Normal  and  no murmur   , No gallop rhythm   Respiratory - Normal Respiratory Effort, Normal breath sounds,  no wheeze , and  no forced expiratory wheeze    Peripheral pitting pedal edema-- none , no calf pain   Gastrointestinal -Soft abdomen, No palpable masses, Non Tender,Liver,Spleen not palpable.   Musculoskeletal- No finger Clubbing. Strength grossly normal   Lymphatic-No Palpable cervical, axillary,Inguinal lymphadenopathy   Psychiatric - normal effect,Orientation  Rt Dorsalis pedis pulses-palpable    Lt Dorsalis pedis pulses- palpable   Rt Posterior tibial pulses -palpable   Left posterior tibial pulses -palpable   Miscellaneous -  Surgical scarabdomen  and  no renal bruit   Limited exam due to the fracture    Investigations  Lab and Imaging have been reviewed in [facility].    Review of Medicine tests    EKG- I had independently reviewed the EKG from--6/12/2023  It was reported to be showing     Normal sinus rhythm   Nonspecific ST-T abn   Borderline Abnormal ECG   When compared with ECG of 23-AUG-2022 09:43,   Criteria for Septal infarct are no longer Present   T wave inversion now evident in Inferior leads   Nonspecific T wave abnormality, improved in Lateral leads    Review of clinical lab tests:  Lab Results   Component Value Date    CREATININE 1.13 06/30/2023    HGB 8.6 (L) 06/30/2023     (H) 06/30/2023           Review of old records- Was done and information gathered regards to events leading to surgery and health conditions of significance in the perioperative period.    Outpatient Subjective & Objective

## 2023-07-10 NOTE — ASSESSMENT & PLAN NOTE
Dec 2021  She did not require hospitalization, intubation or supplemental oxygen   She has recovered from that    She had COVID vaccine     COVID screening     No fever   No cough   No SOB  No sore throat   No loss of taste or smell   No muscle aches   No nausea, vomiting , diarrhea

## 2023-07-10 NOTE — ASSESSMENT & PLAN NOTE
She is under pain management for low back pain   She had spine surgery done in the past  It she seems to be doing fairly well with the pain  She is not on any longstanding pain medication or anti-inflammatory medication     She is not known to have osteoporosis or osteopenia

## 2023-07-10 NOTE — PROGRESS NOTES
[facility]  Progress Note    Patient Name: Mariann Huff  MRN: [id number]  Date of Evaluation- 07/11/2023  PCP- Jasbir Haney MD    Future cases for Mariann Huff [[id number]]       Case ID Status Date Time Dwaine Procedure Provider Location               [id number] [facility] 7/12/2023 12:00  ORIF, FRACTURE, HUMERUS, PROXIMAL Short IMN vs plate.  Diving board backwards  Supine  Plexiglass  [place] proximal humeral nail. Have Axsos 3 plates available Tomasz Leary MD [[id number]] [facility] OR 2ND FLR            HPI:  History of present illness- I had the pleasure of meeting this pleasant 63 y.o. lady in the pre op clinic prior to her elective Orthopedic surgery. The patient is new to me . Ms Waite was accompanied by  Mr Barksdale.    I have obtained the history by speaking to the patient and by reviewing the electronic health records.    Events leading up to surgery / History of presenting illness -    He she fell June 30th leading to the left humerus fracture   She was in the bathroom at that time had  shower trying to dry off when she fell  She could not recollect the events leading to the fall  She felt some dizziness prior to the fall  No heart racing, chest pain or chest tightness, short of breath or tongue bite   No one-sided weakness    Her  came home and took her to the emergency room   She was on the floor for about 5-10 minutes  As per the  she had some altitude awareness when he picked her up  In the emergency room she was found to have fractures and was given a  sling  Her fracture pain seems to be controlled    She has no osteoporosis or osteopenia    As per chart- proximal humerus fracture on the left      Relevant health conditions of significance for the perioperative period/ History of presenting illness -    Health conditions of significance for the perioperative period      - HTN  - Diabetes  - CHF   - CAD-  -Had gall bladder removed on 6/24- pancreatitis   - Sleep  apnea   - Anticoagulation  - History of DVT   -History of Asthma, COVID    She lives with her  in a single-level house  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        Subjective/ Objective:     Chief complaint-Preoperative evaluation, Perioperative Medical management, complication reduction plan     Active cardiac conditions- none    Revised cardiac risk index predictors- coronary artery disease and insulin requiring diabetes mellitus    Functional capacity -Examples of physical activity  can take a flight of stairs holding on to the railing----- She can undertake all the above activities without  chest pain,chest tightness, Shortness of breath ,dizziness,lightheadedness making her exercise tolerance more, than 4 Mets.       Review of Systems   Constitutional:  Negative for chills and fever.        No unusual weight changes     HENT:          Sleep apnea   Eyes:         No new visual changes   Respiratory:          No cough , phlegm    No Hemoptysis   Cardiovascular:         As noted   Gastrointestinal:         No overt GI/ blood losses  Bowel movements- Regular   Endocrine:        Prednisone use > 20 mg daily for 3 weeks- none   Genitourinary:  Negative for dysuria.        No urinary hesitancy    Musculoskeletal:         As above      Skin:  Negative for rash.   Neurological:  Negative for syncope.        No unilateral weakness   Hematological:         Current use of Anticoagulants  Yes  She is on indefinite anticoagulation   Psychiatric/Behavioral:          No Depression,Anxiety         Suggested follow-up regarding abnormal CT angiogram    No anesthesia, bleeding, cardiac problems, PONV with previous surgeries/procedures.  Medications and Allergies reviewed in epic.   FH- No anesthesia,bleeding / venous thrombosis , heart disease in family      Physical Exam  Blood pressure 112/65, pulse  "69, temperature 97.8 °F (36.6 °C), temperature source Oral, resp. rate 14, height 5' 4" (1.626 m), weight 63.5 kg (140 lb), SpO2 96 %.  I offered a blanket        Physical Exam  Constitutional- Vitals - Body mass index is 24.03 kg/m².,   Vitals:    07/10/23 1209   BP: 112/65   Pulse: 69   Resp: 14   Temp: 97.8 °F (36.6 °C)     General appearance-Conscious,Coherent  Eyes- No conjunctival icterus,pupils  round   ENT-Oral cavity- moist    , Hearing grossly normal   Neck- No thyromegaly ,Trachea -central, No jugular venous distension,   No Carotid Bruit   Cardiovascular -Heart Sounds- Normal  and  no murmur   , No gallop rhythm   Respiratory - Normal Respiratory Effort, Normal breath sounds,  no wheeze , and  no forced expiratory wheeze    Peripheral pitting pedal edema-- none , no calf pain   Gastrointestinal -Soft abdomen, No palpable masses, Non Tender,Liver,Spleen not palpable.   Musculoskeletal- No finger Clubbing. Strength grossly normal   Lymphatic-No Palpable cervical, axillary,Inguinal lymphadenopathy   Psychiatric - normal effect,Orientation  Rt Dorsalis pedis pulses-palpable    Lt Dorsalis pedis pulses- palpable   Rt Posterior tibial pulses -palpable   Left posterior tibial pulses -palpable   Miscellaneous -  Surgical scarabdomen  and  no renal bruit   Limited exam due to the fracture    Investigations  Lab and Imaging have been reviewed in [facility].    Review of Medicine tests    EKG- I had independently reviewed the EKG from--6/12/2023  It was reported to be showing     Normal sinus rhythm   Nonspecific ST-T abn   Borderline Abnormal ECG   When compared with ECG of 23-AUG-2022 09:43,   Criteria for Septal infarct are no longer Present   T wave inversion now evident in Inferior leads   Nonspecific T wave abnormality, improved in Lateral leads    Review of clinical lab tests:  Lab Results   Component Value Date    CREATININE 1.13 06/30/2023    HGB 8.6 (L) 06/30/2023     (H) 06/30/2023           Review of " old records- Was done and information gathered regards to events leading to surgery and health conditions of significance in the perioperative period.        Preoperative cardiac risk assessment-  The patient does not have any active cardiac conditions . Revised cardiac risk index predictors- 2---.Functional capacity is more than 4 Mets. She will be undergoing a Orthopedic procedure that carries a Moderate Risk risk     Risk of a major Cardiac event ( Defined as death, myocardial infarction, or cardiac arrest at 30 days after noncardiac surgery), based on RCRI score     -10.1%        No further cardiac work up is indicated prior to proceeding with the surgery     Orders Placed This Encounter    CBC Auto Differential    Comprehensive Metabolic Panel    Hemoglobin A1C       American Society of Anesthesiologists Physical status classification ( ASA ) class: 3         Assessment/Plan:     Cervical arthritis  She has not trouble with neck problems  No dropping things or balance problems    Chronic pain  She is under pain management for low back pain   She had spine surgery done in the past  It she seems to be doing fairly well with the pain  She is not on any longstanding pain medication or anti-inflammatory medication     She is not known to have osteoporosis or osteopenia      Refractive error  She seems to doing fairly well with the eyes    Asthma  She is doing good from an asthma standpoint and not needing the albuterol inhaler much    asthma is controlled . I suggest consideration of inhaled bronchodilator use if the patient has perioperative bronchospasm       Hypertension associated with diabetes  She is currently taking metoprolol 2 times a day, amlodipine and hydralazine   Her medication list does not contain metoprolol   I suggested for her to call me with the dosage of the medication and the name of the medications so that it  can be added to the medication list    Home BP readings -126/60's   Recent BP  readings in the record-  Vitals - 1 value per visit 7/10/2023   SYSTOLIC 112   DIASTOLIC 65   Hypertension-  Blood pressure is acceptable . I suggest continuation of -metoprolol, amlodipine, hydralazine------- during the entire perioperative period. I suggest  blood pressure, electrolyte and renal function monitoring .I suggest addressing pain control as uncontrolled pain can increased blood pressure     Type 2  Diabetes Mellitus  On treatment with oral agents, Insulin    Hemoglobin A1c- 8.1 May 25 th 2023    Capillary glucose check   - Fluctuant       Diabetes Complications     Microvascular     Not known to have   Diabetes affecting the eyes, Kidneys   No tingling numbness of hands and feet  No reported open areas on the feet   Feet care suggested     Macrovascular     No stroke/ TIA   known to have CAD  No suggestion of  lower extremity claudication      Diabetes Mellitus-I suggest monitoring the glucose in the perioperative period ( Before meals and bed time,if the patient is on oral feeds or every 6 hourly ,if the patient is NPO )  Blood glucose target in hospitalized patients is 140-180. Oral Hypoglycemic agents are generally avoided during the hospital stay . If glucose is consistently elevated ,I suggest using basal ,prandial Insulin regimen to control the glucose , as elevated glucose can be associated with adverse surgical out comes. Please consider involving Hospital Medicine or Endocrinology ,if any help is needed with Glucose control. Patient will be instructed based on the pre op clinic guidelines  about adjustment of diabetic treatment (If applicable )  considering the NPO status for Surgery      I had educated that uncontrolled DM can cause post op complications,risk of infection, wound healing problem,increased length of stay in hospital and its associated complications.I suggest exercise as much as possible and follow diabetic diet     I have provided med insulin instructions       CAD (coronary  artery disease)  LAD GINGER prox and distal, unable to cross 80% OM1 lesion.  Old OM1 stent in 2003, LAD stent in 2010    No chest pain or heart attack preceding the stenting  She had heart attack prior  In 2013- was SOB    Stress test 2018    There is no evidence of a discrete hemodynamically significant coronary stenosis    Doing good   Was at an active person prior to the fall  She was able to take a flight of stairs at her son's place prior to the fall    With she has no exertional symptoms    She seems to be doing good from a heart standpoint    Is factors for heart disease  Diabetes   Hypertension  Hyperlipidemia  She was a nonsmoker    As secondary prevention   Aspirin   Statin    ASA - with history of  Stenting.  I have discussed the  risk of stent thrombosis with interruption of Aspirin regimen .Risk ( bleeding ) ,  benefits about perioperative use of  ASA  discussed    To her understanding she can stay on aspirin for surgery  She feels that she is doing good from a heart standpoint    May 2023    The estimated ejection fraction is 55%.  The quantitatively derived ejection fraction is 52%.  Normal right ventricular size with normal right ventricular systolic function.  Normal left ventricular diastolic function.  The left ventricle is normal in size with normal systolic function.  Normal central venous pressure (3 mmHg).      Carotid stenosis, bilateral  May 2022    There is less than 50% right Internal Carotid Stenosis.  There is less than 50% left Internal Carotid Stenosis.    No stroke or mini-stroke      Renal impairment  Baseline Creatinine 1    Stages of CKD discussed  Deleterious effects NSAID's , Beneficial effects of Hydration discussed   Tylenol as needed for pain     I  suggest monitoring renal function, in put and out put status rebecca-operatively. I  suggest avoiding nephrotoxic medication including NSAIDs, COX2 inhibitors, intravenous contrast agent,avoiding hypotension to prevent further renal  impairment.     History of DVT (deep vein thrombosis)  2019 - Leg     She is in the hospital for long time at that time that led to the blood clot   She was hospitalized for a prolonged period of time at that time in the setting of spine surgery ureteral injury    History of DVT  No /PE  1 Episode  Associated with reduced mobility, no long journeys,no  Cancer,no  prior surgery, Hospital stay, no HRT  Anticoagulated since then   IVC filter - yes - removed     2019     1. Persistent occlusive deep vein thrombosis of the left axillary and basilic veins.  2. Superficial thrombophlebitis of the right cephalic vein.  3. Central venous catheter identified extending from the right basilic vein into the right axillary vein.    2019       1. Extension of known thrombus within the right lower extremity veins, now extending from the common femoral vein into the superficial femoral, popliteal, posterior tibial, and peroneal veins.  2. Partially occlusive thrombus within the left common femoral vein extending into the proximal superficial femoral vein, not present on the most recent prior ultrasound.  3. Persistent thrombosis of one of the paired left anterior tibial veins    She is on indefinite anticoagulation  She last took Eliquis on Saturday July the 8 in preparation for surgery on July the total      Fatty liver disease, nonalcoholic  No alcohol excess  A most recent APTT, INR normal    She had gallbladder removal on June 23rd    No history  of cirrhosis of liver or suggestions of Liver  decompensation   No Jaundice , dark urine , pale stool       Suggest care with usage of Medication that are hepatotoxic or  Metabolized in the liver     She has recovered from the gallbladder surgery               Sleep apnea  Sleep apnea  Not using  CPAP/BIPAP  . Informed the risk of worsening sleep apnea in the perioperative period       Possible sleep apnea- I suggest a sleep study and suggest caution with usage of medication that can  cause respiratory suppression in the perioperative period  potential ramifications of untreated sleep apnea, which could include daytime sleepiness, hypertension, heart disease and stroke were discussed    Avoidance of  supine sleep, weight gain , alcoholic beverages , care with , sedative , CNS depressant use indicated  since all of these can worsen MURTAZA             History of hysterectomy  For excessive vaginal bleeding   Age -37    Ovaries not retained   No HRT     History of tracheostomy  2019     - I suggest that the perioperative team be aware of this so that appropriate preventive care can be taken     History of COVID-19  Dec 2021  She did not require hospitalization, intubation or supplemental oxygen   She has recovered from that    She had COVID vaccine     COVID screening     No fever   No cough   No SOB  No sore throat   No loss of taste or smell   No muscle aches   No nausea, vomiting , diarrhea          Preventive perioperative care    Thromboembolic prophylaxis:  Her risk factors for thrombosis include surgical procedure, previous history of thrombosis, and age.I suggest  thromboembolic prophylaxis ( mechanical/pharmacological, weighing the risk benefits of pharmacological agent use considering rebecca procedural bleeding )  during the perioperative period.I suggested being active in the post operative period.      Postoperative pulmonary complication prophylaxis-Risk factors for post operative pulmonary complications include ASA class >2- I suggest incentive spirometry use, early ambulation, and end tidal carbon dioxide monitoring  , oral care , head end of bed elevation      Renal complication prophylaxis-Risk factors for renal complications include pre-existing renal disease, diabetes mellitus, and hypertension . I suggest keeping her well hydrated and avoidance/ minimizing the use of  NSAID's,WHEATLEY 2 Inhibitors ,IV contrast if possible in the perioperative period.     Surgical site Infection  Prophylaxis-I  suggest appropriate antibiotic for Prophylaxis against Surgical site infections  No reported Staph infection  Skin antibacterial discussed         This visit was focused on Preoperative evaluation, Perioperative Medical management, complication reduction plans. I suggest that the patient follows up with primary care or relevant sub specialists for ongoing health care.    I appreciate the opportunity to be involved in this patients care. Please feel free to contact me if there were any questions about this consultation.    Patient is optimized    Patient/ care giver/ Family member was instructed to call and update me about any changes to health,  medication, office visits ,testing out side of the [facility] operative care center , hospitalizations between now and surgery      Mayra Marin MD  Internal Medicine  [facility]   Cell Phone- [phone]    I have spent --80---- minutes of time which includes, time spent to prepare to see the patient , obtaining history ,performing examination, counseling/Educating the patient , Documenting clinical information in the record      Checked for OTC medication    --  7/10/2023- 1922    Followed up on the labs  Hemoglobin A1c is increased at 8.8.  This could be related to her acute illness that she went through over the past few weeks-she had cholecystectomy June the 23rd 2023    Creatinine is stable at 1.1  Alk-phos is elevated.  This could be from a bony source given the recent fracture   Total bilirubin is normal suggesting that the elevated alk-phos is unlikely to be from a biliary source    Liver enzymes are elevated    APTT, INR are normal    White cell count has normalized  Hemoglobin is 8.4   Platelet count has improved     The low hemoglobin is likely from her acute illness she went through with pancreatitis and  humerus fracture      Called and spoke to her    Labs abnormal - acceptable for surgery   Added Metoprolol tartrate  to her medication list     Called to follow up , spoke to   to address any concerns with the up coming surgery or any questions on Medication instructions -  Doing well ,No changes to Medication, Health -           Postoperative pulmonary complication risk assessment    ARISCAT ( Canet) risk index- risk class -   low  if duration of surgery is under or equal to 2 hours ,-intermediate - if duration of surgery is  over 2 hours        Older records reviewed   Percutaneous coronary intervention May 2010    Suggested follow-up on the abnormal labs in primary care    Reduced dose of Insulin discussed for night before surgery   No hypoglycemia  Watch glucose closely  Abdominal wounds healing      Noted that his her hydrochlorothiazide was discontinued - discussed with    Watch BP    --  7/11/2023- 1854     Noted that she has an upcoming lab appointment  Messaged primary care doctor about her liver function test and requested follow-up    Called and spoke to    Wife doing fine   Resting   Follow-up about liver function test    Creatinine - close to base line     Called to follow up , spoke to  to address any concerns with the up coming surgery or any questions on Medication instructions -  Doing well ,No changes to Medication, Health -      Her  is managing her glucose based on the glucose readings- she only gets Levemir once a night  She was discharged with 14 units of Levemir every night and as per the  it is a  high dose and he was able to bring her glucose down from 272 down to 127 with 3 units of Levemir last night  Explained how to do the 80 % of levemir the night before surgery which is to night   Watch glucose closely and work on Insulin dose  Has upcoming Surgery  follow up evaluation 7/28    Call, if needed   Hold Glipizide AM of surgery  Stay on Metoprolol   --  7/11/2023-1934     Called to speak to her    Left my cell phone number to call, if needed

## 2023-07-10 NOTE — ASSESSMENT & PLAN NOTE
May 2022     There is less than 50% right Internal Carotid Stenosis.   There is less than 50% left Internal Carotid Stenosis.    No stroke or mini-stroke

## 2023-07-11 ENCOUNTER — ANESTHESIA EVENT (OUTPATIENT)
Dept: SURGERY | Facility: HOSPITAL | Age: 63
End: 2023-07-11
Payer: COMMERCIAL

## 2023-07-11 ENCOUNTER — TELEPHONE (OUTPATIENT)
Dept: ORTHOPEDICS | Facility: CLINIC | Age: 63
End: 2023-07-11
Payer: COMMERCIAL

## 2023-07-11 NOTE — TELEPHONE ENCOUNTER
Spoke with pt's .  Advised NPO after midnight.  Arrival time of 10:30 am @ [facility].   verbalized understanding

## 2023-07-12 ENCOUNTER — ANESTHESIA (OUTPATIENT)
Dept: SURGERY | Facility: HOSPITAL | Age: 63
End: 2023-07-12
Payer: COMMERCIAL

## 2023-07-12 ENCOUNTER — HOSPITAL ENCOUNTER (OUTPATIENT)
Facility: HOSPITAL | Age: 63
Discharge: HOME | End: 2023-07-12
Attending: ORTHOPAEDIC SURGERY | Admitting: ORTHOPAEDIC SURGERY
Payer: COMMERCIAL

## 2023-07-12 VITALS
TEMPERATURE: 97 F | WEIGHT: 140 LBS | OXYGEN SATURATION: 96 % | BODY MASS INDEX: 23.9 KG/M2 | RESPIRATION RATE: 20 BRPM | DIASTOLIC BLOOD PRESSURE: 55 MMHG | HEIGHT: 64 IN | SYSTOLIC BLOOD PRESSURE: 112 MMHG | HEART RATE: 75 BPM

## 2023-07-12 DIAGNOSIS — S42.202A CLOSED FRACTURE OF PROXIMAL END OF LEFT HUMERUS, UNSPECIFIED FRACTURE MORPHOLOGY, INITIAL ENCOUNTER: ICD-10-CM

## 2023-07-12 LAB
POCT GLUCOSE: 212 MG/DL (ref 70–110)
POCT GLUCOSE: 213 MG/DL (ref 70–110)

## 2023-07-12 PROCEDURE — 71000015 HC POSTOP RECOV 1ST HR: Performed by: ORTHOPAEDIC SURGERY

## 2023-07-12 PROCEDURE — 37000009 HC ANESTHESIA EA ADD 15 MINS: Performed by: ORTHOPAEDIC SURGERY

## 2023-07-12 PROCEDURE — C1769 GUIDE WIRE: HCPCS | Performed by: ORTHOPAEDIC SURGERY

## 2023-07-12 PROCEDURE — 71000045 HC DOSC ROUTINE RECOVERY EA ADD'L HR: Performed by: ORTHOPAEDIC SURGERY

## 2023-07-12 PROCEDURE — 82962 GLUCOSE BLOOD TEST: CPT | Performed by: ORTHOPAEDIC SURGERY

## 2023-07-12 PROCEDURE — 25000003 PHARM REV CODE 250: Performed by: STUDENT IN AN ORGANIZED HEALTH CARE EDUCATION/TRAINING PROGRAM

## 2023-07-12 PROCEDURE — 63600175 PHARM REV CODE 636 W HCPCS: Performed by: ORTHOPAEDIC SURGERY

## 2023-07-12 PROCEDURE — 71000044 HC DOSC ROUTINE RECOVERY FIRST HOUR: Performed by: ORTHOPAEDIC SURGERY

## 2023-07-12 PROCEDURE — 23615 PR OPEN TREATMENT PROX HUMERAL FRACTURE: ICD-10-PCS | Mod: LT,,, | Performed by: ORTHOPAEDIC SURGERY

## 2023-07-12 PROCEDURE — D9220A PRA ANESTHESIA: Mod: CRNA,,, | Performed by: STUDENT IN AN ORGANIZED HEALTH CARE EDUCATION/TRAINING PROGRAM

## 2023-07-12 PROCEDURE — 64416 NJX AA&/STRD BRCH PL NFS IMG: CPT

## 2023-07-12 PROCEDURE — 63600175 PHARM REV CODE 636 W HCPCS: Performed by: NURSE ANESTHETIST, CERTIFIED REGISTERED

## 2023-07-12 PROCEDURE — 27201423 OPTIME MED/SURG SUP & DEVICES STERILE SUPPLY: Performed by: ORTHOPAEDIC SURGERY

## 2023-07-12 PROCEDURE — 36000710: Performed by: ORTHOPAEDIC SURGERY

## 2023-07-12 PROCEDURE — 25000003 PHARM REV CODE 250: Performed by: NURSE PRACTITIONER

## 2023-07-12 PROCEDURE — 63600175 PHARM REV CODE 636 W HCPCS: Performed by: STUDENT IN AN ORGANIZED HEALTH CARE EDUCATION/TRAINING PROGRAM

## 2023-07-12 PROCEDURE — 63600175 PHARM REV CODE 636 W HCPCS: Performed by: ANESTHESIOLOGY

## 2023-07-12 PROCEDURE — C1713 ANCHOR/SCREW BN/BN,TIS/BN: HCPCS | Performed by: ORTHOPAEDIC SURGERY

## 2023-07-12 PROCEDURE — 63600175 PHARM REV CODE 636 W HCPCS

## 2023-07-12 PROCEDURE — 36000711: Performed by: ORTHOPAEDIC SURGERY

## 2023-07-12 PROCEDURE — 64416 NJX AA&/STRD BRCH PL NFS IMG: CPT | Mod: 59,LT,, | Performed by: ANESTHESIOLOGY

## 2023-07-12 PROCEDURE — 23615 OPTX PROX HUMRL FX W/INT FIX: CPT | Mod: LT,,, | Performed by: ORTHOPAEDIC SURGERY

## 2023-07-12 PROCEDURE — 25000003 PHARM REV CODE 250: Performed by: NURSE ANESTHETIST, CERTIFIED REGISTERED

## 2023-07-12 PROCEDURE — 25000003 PHARM REV CODE 250: Performed by: ORTHOPAEDIC SURGERY

## 2023-07-12 PROCEDURE — 64416 LEFT INTERSCALENE CATHETER: ICD-10-PCS | Mod: 59,LT,, | Performed by: ANESTHESIOLOGY

## 2023-07-12 PROCEDURE — D9220A PRA ANESTHESIA: ICD-10-PCS | Mod: CRNA,,, | Performed by: STUDENT IN AN ORGANIZED HEALTH CARE EDUCATION/TRAINING PROGRAM

## 2023-07-12 PROCEDURE — D9220A PRA ANESTHESIA: Mod: ANES,,, | Performed by: ANESTHESIOLOGY

## 2023-07-12 PROCEDURE — 37000008 HC ANESTHESIA 1ST 15 MINUTES: Performed by: ORTHOPAEDIC SURGERY

## 2023-07-12 PROCEDURE — D9220A PRA ANESTHESIA: ICD-10-PCS | Mod: ANES,,, | Performed by: ANESTHESIOLOGY

## 2023-07-12 DEVICE — SCREW LOCKING BONE T2 5X35MM: Type: IMPLANTABLE DEVICE | Site: HUMERUS | Status: FUNCTIONAL

## 2023-07-12 DEVICE — SCREW LOCKING BONE T2 5X40MM: Type: IMPLANTABLE DEVICE | Site: HUMERUS | Status: FUNCTIONAL

## 2023-07-12 RX ORDER — CEFAZOLIN SODIUM 1 G/3ML
INJECTION, POWDER, FOR SOLUTION INTRAMUSCULAR; INTRAVENOUS
Status: DISCONTINUED | OUTPATIENT
Start: 2023-07-12 | End: 2023-07-12

## 2023-07-12 RX ORDER — ONDANSETRON 2 MG/ML
INJECTION INTRAMUSCULAR; INTRAVENOUS
Status: COMPLETED
Start: 2023-07-12 | End: 2023-07-12

## 2023-07-12 RX ORDER — FENTANYL CITRATE 50 UG/ML
25 INJECTION, SOLUTION INTRAMUSCULAR; INTRAVENOUS EVERY 5 MIN PRN
Status: DISCONTINUED | OUTPATIENT
Start: 2023-07-12 | End: 2023-07-12 | Stop reason: HOSPADM

## 2023-07-12 RX ORDER — SODIUM CHLORIDE 9 MG/ML
INJECTION, SOLUTION INTRAVENOUS CONTINUOUS
Status: DISCONTINUED | OUTPATIENT
Start: 2023-07-12 | End: 2023-07-12 | Stop reason: HOSPADM

## 2023-07-12 RX ORDER — LIDOCAINE HYDROCHLORIDE 10 MG/ML
1 INJECTION, SOLUTION EPIDURAL; INFILTRATION; INTRACAUDAL; PERINEURAL ONCE AS NEEDED
Status: COMPLETED | OUTPATIENT
Start: 2023-07-12 | End: 2023-07-12

## 2023-07-12 RX ORDER — ROPIVACAINE HYDROCHLORIDE 2 MG/ML
INJECTION, SOLUTION EPIDURAL; INFILTRATION; PERINEURAL
Status: DISCONTINUED
Start: 2023-07-12 | End: 2023-07-12 | Stop reason: HOSPADM

## 2023-07-12 RX ORDER — LIDOCAINE HYDROCHLORIDE 20 MG/ML
INJECTION INTRAVENOUS
Status: DISCONTINUED | OUTPATIENT
Start: 2023-07-12 | End: 2023-07-12

## 2023-07-12 RX ORDER — EPHEDRINE SULFATE 50 MG/ML
INJECTION, SOLUTION INTRAVENOUS
Status: DISCONTINUED | OUTPATIENT
Start: 2023-07-12 | End: 2023-07-12

## 2023-07-12 RX ORDER — CELECOXIB 200 MG/1
200 CAPSULE ORAL DAILY
Qty: 10 CAPSULE | Refills: 0 | Status: ON HOLD | OUTPATIENT
Start: 2023-07-12 | End: 2023-08-07 | Stop reason: HOSPADM

## 2023-07-12 RX ORDER — HALOPERIDOL 5 MG/ML
0.5 INJECTION INTRAMUSCULAR EVERY 10 MIN PRN
Status: DISCONTINUED | OUTPATIENT
Start: 2023-07-12 | End: 2023-07-12 | Stop reason: HOSPADM

## 2023-07-12 RX ORDER — ONDANSETRON 2 MG/ML
8 INJECTION INTRAMUSCULAR; INTRAVENOUS ONCE
Status: COMPLETED | OUTPATIENT
Start: 2023-07-12 | End: 2023-07-12

## 2023-07-12 RX ORDER — PROPOFOL 10 MG/ML
VIAL (ML) INTRAVENOUS
Status: DISCONTINUED | OUTPATIENT
Start: 2023-07-12 | End: 2023-07-12

## 2023-07-12 RX ORDER — FENTANYL CITRATE 50 UG/ML
25-200 INJECTION, SOLUTION INTRAMUSCULAR; INTRAVENOUS
Status: ACTIVE | OUTPATIENT
Start: 2023-07-12

## 2023-07-12 RX ORDER — HALOPERIDOL 5 MG/ML
INJECTION INTRAMUSCULAR
Status: DISCONTINUED | OUTPATIENT
Start: 2023-07-12 | End: 2023-07-12

## 2023-07-12 RX ORDER — MUPIROCIN 20 MG/G
OINTMENT TOPICAL
Status: DISCONTINUED | OUTPATIENT
Start: 2023-07-12 | End: 2023-07-12 | Stop reason: HOSPADM

## 2023-07-12 RX ORDER — ROCURONIUM BROMIDE 10 MG/ML
INJECTION, SOLUTION INTRAVENOUS
Status: DISCONTINUED | OUTPATIENT
Start: 2023-07-12 | End: 2023-07-12

## 2023-07-12 RX ORDER — ROPIVACAINE HYDROCHLORIDE 2 MG/ML
INJECTION, SOLUTION EPIDURAL; INFILTRATION; PERINEURAL CONTINUOUS
Status: DISCONTINUED | OUTPATIENT
Start: 2023-07-12 | End: 2023-07-12 | Stop reason: HOSPADM

## 2023-07-12 RX ORDER — ROPIVACAINE HYDROCHLORIDE 5 MG/ML
INJECTION, SOLUTION EPIDURAL; INFILTRATION; PERINEURAL
Status: DISCONTINUED | OUTPATIENT
Start: 2023-07-12 | End: 2023-07-12

## 2023-07-12 RX ORDER — ONDANSETRON 8 MG/1
8 TABLET, ORALLY DISINTEGRATING ORAL EVERY 8 HOURS PRN
Status: DISCONTINUED | OUTPATIENT
Start: 2023-07-12 | End: 2023-07-12 | Stop reason: HOSPADM

## 2023-07-12 RX ORDER — MIDAZOLAM HYDROCHLORIDE 1 MG/ML
.5-4 INJECTION INTRAMUSCULAR; INTRAVENOUS
Status: ACTIVE | OUTPATIENT
Start: 2023-07-12

## 2023-07-12 RX ORDER — ACETAMINOPHEN 10 MG/ML
INJECTION, SOLUTION INTRAVENOUS
Status: DISCONTINUED | OUTPATIENT
Start: 2023-07-12 | End: 2023-07-12

## 2023-07-12 RX ORDER — TRANEXAMIC ACID 100 MG/ML
INJECTION, SOLUTION INTRAVENOUS
Status: DISCONTINUED | OUTPATIENT
Start: 2023-07-12 | End: 2023-07-12

## 2023-07-12 RX ORDER — DEXTROMETHORPHAN HYDROBROMIDE, GUAIFENESIN 5; 100 MG/5ML; MG/5ML
650 LIQUID ORAL EVERY 8 HOURS
Qty: 50 TABLET | Refills: 0 | Status: ON HOLD | OUTPATIENT
Start: 2023-07-12 | End: 2023-08-23 | Stop reason: ALTCHOICE

## 2023-07-12 RX ORDER — OXYCODONE HYDROCHLORIDE 5 MG/1
5 TABLET ORAL
Status: DISCONTINUED | OUTPATIENT
Start: 2023-07-12 | End: 2023-07-12 | Stop reason: HOSPADM

## 2023-07-12 RX ORDER — ONDANSETRON 2 MG/ML
4 INJECTION INTRAMUSCULAR; INTRAVENOUS DAILY PRN
Status: DISCONTINUED | OUTPATIENT
Start: 2023-07-12 | End: 2023-07-12 | Stop reason: HOSPADM

## 2023-07-12 RX ORDER — MIDAZOLAM HYDROCHLORIDE 1 MG/ML
INJECTION INTRAMUSCULAR; INTRAVENOUS
Status: DISCONTINUED | OUTPATIENT
Start: 2023-07-12 | End: 2023-07-12

## 2023-07-12 RX ORDER — VANCOMYCIN HYDROCHLORIDE 1 G/20ML
INJECTION, POWDER, LYOPHILIZED, FOR SOLUTION INTRAVENOUS
Status: DISCONTINUED | OUTPATIENT
Start: 2023-07-12 | End: 2023-07-12 | Stop reason: HOSPADM

## 2023-07-12 RX ORDER — SODIUM CHLORIDE 0.9 % (FLUSH) 0.9 %
10 SYRINGE (ML) INJECTION
Status: DISCONTINUED | OUTPATIENT
Start: 2023-07-12 | End: 2023-07-12 | Stop reason: HOSPADM

## 2023-07-12 RX ORDER — SUCCINYLCHOLINE CHLORIDE 20 MG/ML
INJECTION INTRAMUSCULAR; INTRAVENOUS
Status: DISCONTINUED | OUTPATIENT
Start: 2023-07-12 | End: 2023-07-12

## 2023-07-12 RX ORDER — HYDROCODONE BITARTRATE AND ACETAMINOPHEN 10; 325 MG/1; MG/1
1 TABLET ORAL EVERY 4 HOURS PRN
Status: DISCONTINUED | OUTPATIENT
Start: 2023-07-12 | End: 2023-07-12 | Stop reason: HOSPADM

## 2023-07-12 RX ORDER — FENTANYL CITRATE 50 UG/ML
INJECTION, SOLUTION INTRAMUSCULAR; INTRAVENOUS
Status: DISCONTINUED | OUTPATIENT
Start: 2023-07-12 | End: 2023-07-12

## 2023-07-12 RX ORDER — HYDROCODONE BITARTRATE AND ACETAMINOPHEN 5; 325 MG/1; MG/1
1 TABLET ORAL EVERY 4 HOURS PRN
Status: DISCONTINUED | OUTPATIENT
Start: 2023-07-12 | End: 2023-07-12 | Stop reason: HOSPADM

## 2023-07-12 RX ADMIN — ROCURONIUM BROMIDE 45 MG: 10 INJECTION, SOLUTION INTRAVENOUS at 12:07

## 2023-07-12 RX ADMIN — ROCURONIUM BROMIDE 15 MG: 10 INJECTION, SOLUTION INTRAVENOUS at 01:07

## 2023-07-12 RX ADMIN — ONDANSETRON 8 MG: 2 INJECTION INTRAMUSCULAR; INTRAVENOUS at 11:07

## 2023-07-12 RX ADMIN — SUGAMMADEX 200 MG: 100 INJECTION, SOLUTION INTRAVENOUS at 02:07

## 2023-07-12 RX ADMIN — ROPIVACAINE HYDROCHLORIDE 10 ML: 5 INJECTION EPIDURAL; INFILTRATION; PERINEURAL at 03:07

## 2023-07-12 RX ADMIN — SUCCINYLCHOLINE CHLORIDE 120 MG: 20 INJECTION, SOLUTION INTRAMUSCULAR; INTRAVENOUS at 11:07

## 2023-07-12 RX ADMIN — SODIUM CHLORIDE: 9 INJECTION, SOLUTION INTRAVENOUS at 11:07

## 2023-07-12 RX ADMIN — MUPIROCIN: 20 OINTMENT TOPICAL at 11:07

## 2023-07-12 RX ADMIN — EPHEDRINE SULFATE 25 MG: 50 INJECTION INTRAVENOUS at 11:07

## 2023-07-12 RX ADMIN — LIDOCAINE HYDROCHLORIDE 2 MG: 10 INJECTION, SOLUTION EPIDURAL; INFILTRATION; INTRACAUDAL; PERINEURAL at 11:07

## 2023-07-12 RX ADMIN — TRANEXAMIC ACID 1000 MG: 100 INJECTION INTRAVENOUS at 02:07

## 2023-07-12 RX ADMIN — CEFAZOLIN 2 G: 330 INJECTION, POWDER, FOR SOLUTION INTRAMUSCULAR; INTRAVENOUS at 11:07

## 2023-07-12 RX ADMIN — LIDOCAINE HYDROCHLORIDE 60 MG: 20 INJECTION INTRAVENOUS at 11:07

## 2023-07-12 RX ADMIN — FENTANYL CITRATE 25 MCG: 50 INJECTION INTRAMUSCULAR; INTRAVENOUS at 03:07

## 2023-07-12 RX ADMIN — SODIUM CHLORIDE, SODIUM GLUCONATE, SODIUM ACETATE, POTASSIUM CHLORIDE, MAGNESIUM CHLORIDE, SODIUM PHOSPHATE, DIBASIC, AND POTASSIUM PHOSPHATE: .53; .5; .37; .037; .03; .012; .00082 INJECTION, SOLUTION INTRAVENOUS at 12:07

## 2023-07-12 RX ADMIN — EPHEDRINE SULFATE 5 MG: 50 INJECTION INTRAVENOUS at 11:07

## 2023-07-12 RX ADMIN — FENTANYL CITRATE 100 MCG: 0.05 INJECTION, SOLUTION INTRAMUSCULAR; INTRAVENOUS at 11:07

## 2023-07-12 RX ADMIN — HALOPERIDOL LACTATE 1 MG: 5 INJECTION, SOLUTION INTRAMUSCULAR at 02:07

## 2023-07-12 RX ADMIN — MIDAZOLAM HYDROCHLORIDE 1 MG: 1 INJECTION INTRAMUSCULAR; INTRAVENOUS at 11:07

## 2023-07-12 RX ADMIN — ROCURONIUM BROMIDE 5 MG: 10 INJECTION, SOLUTION INTRAVENOUS at 11:07

## 2023-07-12 RX ADMIN — ACETAMINOPHEN 1000 MG: 10 INJECTION INTRAVENOUS at 01:07

## 2023-07-12 RX ADMIN — PROPOFOL 150 MG: 10 INJECTION, EMULSION INTRAVENOUS at 11:07

## 2023-07-12 NOTE — TRANSFER OF CARE
"Anesthesia Transfer of Care Note    Patient: Mariann Huff    Procedure(s) Performed: Procedure(s) (LRB):  ORIF, FRACTURE, HUMERUS, PROXIMAL  (Left)    Patient location: [facility]    Anesthesia Type: general    Transport from OR: Transported from OR on 6-10 L/min O2 by face mask with adequate spontaneous ventilation    Post pain: adequate analgesia    Post assessment: no apparent anesthetic complications and tolerated procedure well    Post vital signs: stable    Level of consciousness: alert and awake    Nausea/Vomiting: no nausea/vomiting    Complications: none    Transfer of care protocol was followed      Last vitals:   Visit Vitals  BP (!) 117/57   Pulse 72   Temp 36.7 °C (98.1 °F) (Temporal)   Resp 20   Ht 5' 4" (1.626 m)   Wt 63.5 kg (140 lb)   LMP  (LMP Unknown)   SpO2 100%   Breastfeeding No   BMI 24.03 kg/m²     "

## 2023-07-12 NOTE — PLAN OF CARE
Pt is alert and oriented with no s/s of acute distress. Her vital signs are stable. Pt is tolerating PO fluids without difficulty. Pt reports pain to L shoulder a 3/10 but states this is comfortable and states she wants to go home. Pt denies N/V. Her incision is clean dry and intact. Her catheter is intact without complication.

## 2023-07-12 NOTE — ANESTHESIA PROCEDURE NOTES
Left interscalene catheter    Patient location during procedure: post-op   Block not for primary anesthetic.  Reason for block: at surgeon's request and post-op pain management   Post-op Pain Location: left arm pain   Start time: 7/12/2023 3:31 PM  Timeout: 7/12/2023 3:28 PM   End time: 7/12/2023 3:50 PM    Staffing  Authorizing Provider: Savi Mendoza MD  Performing Provider: Mook Chavez DO    Preanesthetic Checklist  Completed: patient identified, IV checked, site marked, risks and benefits discussed, surgical consent, monitors and equipment checked, pre-op evaluation and timeout performed  Peripheral Block  Patient position: sitting  Prep: ChloraPrep and site prepped and draped  Patient monitoring: heart rate, cardiac monitor, continuous pulse ox, continuous capnometry and frequent blood pressure checks  Block type: interscalene  Laterality: left  Injection technique: continuous  Needle  Needle type: Tuohy   Needle gauge: 18 G  Needle length: 2 in  Needle localization: anatomical landmarks and ultrasound guidance  Catheter type: non-stimulating  Catheter size: 20 G  Test dose: lidocaine 1.5% with Epi 1-to-200,000 and negative   -ultrasound image captured on disc.  Assessment  Injection assessment: negative aspiration, negative parasthesia and local visualized surrounding nerve  Paresthesia pain: none  Heart rate change: no  Slow fractionated injection: yes  Pain Tolerance: comfortable throughout block and no complaints  Medications:    Medications: ropivacaine (NAROPIN) injection 0.5% - Perineural   10 mL - 7/12/2023 3:50:00 PM    Additional Notes  VSS.  [facility] RN monitoring vitals throughout procedure.  Patient tolerated procedure well.

## 2023-07-12 NOTE — BRIEF OP NOTE
Jose Frey - Surgery ([facility])  Brief Operative Note    Surgery Date: 7/12/2023     Surgeon(s) and Role:     * Tomasz Leary MD - Primary     * Florin Trinh MD - Resident - Assisting     * Aime Roque MD - Resident - Assisting        Pre-op Diagnosis:  Closed fracture of proximal end of left humerus, unspecified fracture morphology, initial encounter [S42.202A]    Post-op Diagnosis:  Post-Op Diagnosis Codes:     * Closed fracture of proximal end of left humerus, unspecified fracture morphology, initial encounter [S42.202A]    Procedure(s) (LRB):  ORIF, FRACTURE, HUMERUS, PROXIMAL  (Left)    Anesthesia: General    Operative Findings: See op note    Estimated Blood Loss: See op note         Specimens:   Specimen (24h ago, onward)      None              Discharge Note    OUTCOME: Patient tolerated treatment/procedure well without complication and is now ready for discharge.    DISPOSITION: Home or Self Care    FINAL DIAGNOSIS:  Closed fracture of left proximal humerus    FOLLOWUP: In clinic    DISCHARGE INSTRUCTIONS:    Discharge Procedure Orders   Diet general     Call MD for:  temperature >100.4     Call MD for:  persistent nausea and vomiting     Call MD for:  severe uncontrolled pain     Call MD for:  difficulty breathing, headache or visual disturbances     Call MD for:  redness, tenderness, or signs of infection (pain, swelling, redness, odor or green/yellow discharge around incision site)     Call MD for:  hives     Call MD for:  persistent dizziness or light-headedness     Call MD for:  extreme fatigue     Ice to affected area   Order Comments: using barrier between ice and skin (specify duration&frequency)     No driving, operating heavy equipment or signing legal documents while taking pain medication     Leave dressing on - Keep it clean, dry, and intact until clinic visit     Non weight bearing

## 2023-07-12 NOTE — OP NOTE
OPERATIVE NOTE    DATE OF PROCEDURE:  07/12/2023    PREOPERATIVE DIAGNOSIS:   Left proximal humerus fracture, surgical neck, closed, displaced, initial encounter   Fall from standing    POSTOPERATIVE DIAGNOSIS:   Left proximal humerus fracture, surgical neck, closed, displaced, initial encounter   Fall from standing    PROCEDURE:   Open reduction internal fixation left proximal humerus fracture with intramedullary nail    SURGEON:   Tomasz Leary MD    ASSISTANT:    MD Pradeep Stein MD    ANESTHESIA:   General    EBL:    40mL    COMPLICATIONS:  none    IMPLANTS:   Coulters  Proximal humeral nail, [id number]  5.0 mm proximal interlocking screw, x4   4.0 mm distal interlocking screw, x1    Vancomycin 1g    SPECIMENS:   none    INDICATIONS FOR PROCEDURE:  63-year-old female, right-hand dominant, fall 06/30/2023   Left proximal humerus fracture, surgical neck, closed, displaced, initial encounter.  Significant varus angulation and translation of the shaft.  She was initially treated in a sling, then followed up in orthopedic clinic.  At that point we discussed non operative versus operative management she is here today for operative fixation.    Lives at home with    Right-hand dominant  Significant past medical history including   Diabetes, HbA1c 8.8  coronary artery disease, heart failure, PAD, afib on eliquis  Denies tobacco use    Discussed injury with patient and family.  Discussed non operative versus operative management options.  At this point she is had significant difficulty wearing the sling and being unable to use lives her arm.  She has significant pain.  She also has fracture characteristics SSEPs were at risk for nonunion/malunion.  Discussed operative intervention the form open reduction internal fixation with intramedullary nail.  Hopefully this could improve the alignment, early function, long-term outcome.    The risks, benefits, and alternatives to surgery were discussed  with the patient and/or family.    Specific risks discussed included, but were not limited to:  Shoulder pain, painful prominent hardware, damage to nearby structures, including neurovascular structures leading to loss of function or loss of limb, bleeding, need for blood transfusion, pain, stiffness, scarring, numbness, tingling, weakness, compartment syndrome, malunion/nonunion, hardware failure, hardware prominence, infection, need for multiple staged procedures, prolonged antibiotics, iatrogenic fracture, heterotopic ossification, arthritis, a variety of medical complications including but not limited to heart attack, stroke, deep venous thrombosis, pulmonary embolism, prolonged hospitalization, prolonged intubation, and death.   Patient and/or family expressed an understanding and desires to proceed with surgery.   All questions were answered.  No guarantees were implied or stated.  Informed consent was obtained.      OPERATIVE PROCEDURE:  Patient met in the preoperative hold area and the correct site and side of surgery being the left upper extremity were marked and verified.  Patient brought back to the operative suite.  General anesthesia smoothly induced.  Patient transferred over to operative table.  Placed in supine position. All bony prominences were appropriately padded.  Patient received 2 g Ancef for preoperative antibiotics.  The left upper extremity was then prepped and draped in normal sterile fashion.    Time-out was performed verifying the correct patient, site/side of surgery, surgical consent, radiographs as applicable, preop antibiotics, necessary equipment, anticipated blood loss, length of procedure, postoperative disposition.    We turned our attention to open reduction internal in of the left humerus and intramedullary nail placement.  We performed a mini deltoid split along the anterior lateral aspect of the acromion guided by fluoroscopy.  Skin incised with scalpel.  Hemostasis achieved  as needed electrocautery.  Deltoid fibers split in line with the fascia.  We then split the rotator cuff and placed stay sutures.  In order to obtain an appropriate start point with a guidewire, we inserted K-wire x2 to manipulate the proximal segment and 2 be able to visualize the appropriate starting point on the proximal humerus.  We then used fluoroscopy and inserted a guidewire appropriate center center position for our start point for the nail.  We then retracted rotator cuff and he used the awl to open the proximal canal.  We then inserted a ball-tip guidewire into the proximal humerus fracture, reduced the fracture using traction reduction maneuvers, passed a ball-tip guidewire across the fracture into the distal intramedullary canal.       We planned for short nail.  We reamed with a 9 mm Reamer to ensure an 8 mm nail would fit.   The nail was inserted into the proximal humerus, across fracture, and observed on fluoroscopy to ensure appropriate nail insertion and maintenance of fracture reduction.  Once the nail was appropriately seated, we ensured that there was no undue prominence proximally.  We assessed our fracture reduction, limb length, alignment, rotation were satisfied.  We used the outrigger guide and cannulas proximally.  We made percutaneous stab incisions and inserted 4 proximal interlocking screws through the nail.  This was confirmed on fluoroscopy.  We then turned our attention to distal screw insertion.  We used the outrigger guide, a cannula, made percutaneous stab incisions and inserted a lateral to medial distal locking screws.  Appropriate screw placement was confirmed on fluoroscopy.  Once again reassessed fracture reduction hardware placement were satisfied.  No screws were intra-articular proximally.  There was good stability of the fracture     Wounds irrigated with saline.  Hemostasis achieved as needed with electrocautery.  1 g vancomycin placed deep.  Fascia closed with 0 Vicryl.   Deep tissue closed with 2-0 Vicryl.  Subcutaneous tissue closed with 3-0 Vicryl.  Skin closed with 3-0 Monocryl.  Dermabond, Aquacel, gauze, Tegaderm dressing applied.    At the conclusion of procedure the patient had soft and compressible compartments, brisk cap refill, palpable radial pulse in the operative extremity.    Prior to final closure all counts were confirmed to be correct.  Patient tolerated the procedure well without any complications, was awoken from anesthesia, transferred PACU for further recovery.    POSTOPERATIVE PLAN:  63-year-old female, fall 06/30/2023, left proximal humerus fracture    07/12/2023 - ORIF/IM nail left proximal humerus fracture    Antibiotics times 24 hours if admitted  Resume Eliquis postop day 1  Sling for comfort left upper extremity  Nonweightbearing left upper extremity x8 weeks postop  Range of motion as tolerated left upper extremity  Can use left upper extremity for ADLs    Calcium, vitamin-D  Fragility fracture Clinic referral    X-rays at subsequent followups:  Left shoulder    Follow-up postop 2 weeks, 6 weeks, 3 months, 6 months, 1 year    =====================  Tomasz Leary MD  Orthopaedic Surgery

## 2023-07-12 NOTE — PLAN OF CARE
CM f/u'ed w/ [facility] today 4/15 - pt d/c'ed yesterday w/o clinical info for referral being faxed. Pt will f/u at [facility] office p [phone] but CM was asked to fax info to [place] office f [phone]. The clinic will f/u w/ pt.  
Incision time for posterior part  
Problem: Adult Inpatient Plan of Care  Goal: Plan of Care Review  Outcome: Ongoing (interventions implemented as appropriate)  Pt transported via stretcher from PACU this shift with spouse at side. Pt drowsy but easy arousal upon name call. AAOx4. Oriented to room and floor. Pain medication provided as scheduled. No distress noted. MADHURI drain intact. Dressing to back and side intact without drainage noted at site. Fontenot draining.  Incentive spirometer given and educated on how to use. Acknowledged understanding. Call light in reach. Will cont to monitor.       
Problem: Occupational Therapy Goal  Goal: Occupational Therapy Goal  Goals to be met by: 4/20     Patient will increase functional independence with ADLs by performing:    Donning TLSO while seated EOB with Set-up Assistance and Supervision.  LE Dressing (pants, brief) with Set-up Assistance and Contact Guard Assistance.  Grooming while standing at sink with Minimal Assistance.  Toileting from toilet with Contact Guard Assistance for hygiene and clothing management.   Toilet transfer to toilet with Contact Guard Assistance.  Functional mobility at short household distance for ADL task with RW and CGA.  Pt/CG verbalized understanding for 3/3 spinal precautions.     Outcome: Outcome(s) achieved Date Met: 04/13/19  OT eval completed. rec outpatient PT for d/c planning. DME needs: rolling walker and shower chair. Pt would greatly benefit from nursing mobility to bathroom with assist x1 with rolling walker. PRIMITIVO Corley 4/13/2019       
Problem: Physical Therapy Goal  Goal: Physical Therapy Goal  Goals to be met by: 2019     Patient will increase functional independence with mobility by performin. Supine to sit with Set-up Dickey  2. Sit to supine with Set-up Dickey  3. Sit to stand transfer with Stand-by Assistance  4. Gait  x 100 feet with Stand-by Assistance using Rolling Walker.   5. Stand for 10 minutes while performing dynamic standing balance activities without AD without loss of balance to reduce risk of falls.   20. Lower extremity exercise program x20 reps per handout, with independence.  Outcome: Ongoing (interventions implemented as appropriate)  Evaluation completed, initiated plan of care.   Sarita Valdez, PT  2019        
4 = No assist / stand by assistance

## 2023-07-12 NOTE — ANESTHESIA PREPROCEDURE EVALUATION
[facility]  Anesthesia Pre-Operative Evaluation         Patient Name: Mariann Huff  YOB: 1960  MRN: [id number]    Procedure Summary    Case: [id number] Date/Time: 07/12/23 1255   Procedure: ORIF, FRACTURE, HUMERUS, PROXIMAL Short IMN vs plate.  Diving board backwards  Supine  Plexiglass  [place] proximal humeral nail. Have Axsos 3 plates available (Left: Chest)   Anesthesia type: Choice   Diagnosis: Closed fracture of proximal end of left humerus, unspecified fracture morphology, initial encounter [S42.202A]         HPI 07/12/2023:  Mariann Huff is a 63 y.o. female with hx of HTN, CAD s/p remote GINGER, T2DM, MURTAZA no CPAP, PAD, Afib, Hx of DVT on eliquis admitted with gallstone pancreatitis. Presents for above procedure.    Prev airway:     Mask Ventilation:  Easy with oral airway    Attempts:  3    Attempted By:  Resident anesthesiologist    Method of Intubation:  Direct    Blade:  Eufemia 3    Laryngeal View Grade: Grade IIb - only the arytenoids and epiglottis seen      Attempted By (2nd Attempt):  Resident anesthesiologist    Method of Intubation (2nd Attempt):  Direct    Blade (2nd Attempt):  Shahid 2    Laryngeal View Grade (2nd Attempt): Grade IIb - only the arytenoids and epiglottis seen      Attempted By (3rd Attempt):  Staff anesthesiologist    Method of Intubation (3rd Attempt):  Direct    Blade (3rd Attempt):  Shahid 2    Laryngeal View Grade (3rd Attempt): Grade IIa - cords partially seen      Difficult Airway Encountered?: No      Complications:  None    Airway Device:  Oral endotracheal tube    Airway Device Size:  7.0    Style/Cuff Inflation:  Cuffed    Inflation Amount (mL):  6    Tube secured:  23    Secured at:  The lips    Placement Verified By:  Capnometry    Complicating Factors:  Anterior larynx, small mouth and short neck    Oxygen/Ventilation Requirements:  On room air           Patient Active Problem List   Diagnosis    Hypertension associated with  diabetes    Hyperlipidemia associated with type 2 diabetes mellitus    CAD (coronary artery disease)    Sleep apnea    Carotid stenosis, bilateral    History of non-ST elevation myocardial infarction (NSTEMI)    PAPA (acute kidney injury)    S/P coronary artery stent placement    Nuclear sclerosis - Right Eye    Primary localized osteoarthrosis, lower leg    PSC (posterior subcapsular cataract), right    DME (diabetic macular edema)    Refractive error    Pseudophakia    Type 2 diabetes mellitus with diabetic peripheral angiopathy without gangrene    Diabetic peripheral neuropathy associated with type 2 diabetes mellitus    Cervical arthritis    Neurogenic claudication    Lumbar herniated disc    Fatty liver disease, nonalcoholic    Arthritis of lumbar spine    Chronic pain    Fusion of spine, lumbar region    Lumbosacral radiculopathy at L5    Type 2 diabetes mellitus with stage 2 chronic kidney disease and hypertension    Asthma    Bradycardia    Delayed surgical wound healing    Rectal ulcer    Palliative care encounter    Moderate malnutrition    Acute deep vein thrombosis (DVT) of femoral vein of right lower extremity    Impaired functional mobility and endurance    (HFpEF) heart failure with preserved ejection fraction    Alteration in skin integrity    Debility    Left ureteral injury    Hydronephrosis    Weakness of both lower extremities    Poor balance    Type 2 diabetes mellitus with retinopathy, with long-term current use of insulin    Hypertensive retinopathy, bilateral    SI (sacroiliac) joint dysfunction    S/P ureteral reimplantation    Complex renal cyst    PAD (peripheral artery disease)    Compression fracture of spine    Nontraumatic compression fracture of T12 vertebra    Bilateral renal cysts    Chronic kidney disease, stage 3a    Type 2 diabetes mellitus with chronic kidney disease    History of DVT (deep vein thrombosis)    History of  pancreatitis    Hyperbilirubinemia    ATN (acute tubular necrosis)    Hypophosphatemia    Renal impairment    History of hysterectomy    History of tracheostomy    History of COVID-19       Review of patient's allergies indicates:   Allergen Reactions    Milk containing products (dairy)      Causes GI distress       Outpatient Medications:  Current Facility-Administered Medications on File Prior to Visit   Medication Dose Route Frequency Provider Last Rate Last Admin    0.9%  NaCl infusion   Intravenous Continuous Aime George  mL/hr at 10/27/22 0842 New Bag at 10/27/22 0842    0.9%  NaCl infusion   Intravenous Continuous Tomasz Leary MD        fentaNYL 50 mcg/mL injection  mcg   mcg Intravenous PRN Mook Chavez DO        LIDOcaine (PF) 10 mg/ml (1%) injection 10 mg  1 mL Intradermal Once PRN Tomasz Leary MD        midazolam (VERSED) 1 mg/mL injection 0.5-4 mg  0.5-4 mg Intravenous PRN Mook Chavez DO        mupirocin 2 % ointment   Nasal On Call Procedure Lul Bowser NP        ondansetron 4 mg/2 mL injection             ondansetron injection 8 mg  8 mg Intravenous Once Tomasz Leary MD         Current Outpatient Medications on File Prior to Visit   Medication Sig Dispense Refill    ACCU-CHEK EDIN PLUS METER Misc       albuterol (PROVENTIL/VENTOLIN HFA) 90 mcg/actuation inhaler Inhale 2 puffs into the lungs every 6 (six) hours as needed for Wheezing. 1 g 3    amLODIPine (NORVASC) 10 MG tablet Take 1 tablet (10 mg total) by mouth once daily. 90 tablet 2    apixaban (ELIQUIS) 2.5 mg Tab Take 1 tablet (2.5 mg total) by mouth 2 (two) times daily. 180 tablet 1    aspirin 81 mg Tab Take 81 mg by mouth once daily. 1 Tablet Oral Every day      atorvastatin (LIPITOR) 40 MG tablet Take 1 tablet (40 mg total) by mouth every evening. 90 tablet 3    blood sugar diagnostic (ACCU-CHEK EDIN PLUS TEST STRP) Strp TEST BLOOD SUGARS 4 TIMES DAILY  150 strip 11    cilostazoL (PLETAL) 100 MG Tab Take 1 tablet (100 mg total) by mouth 2 (two) times daily. 60 tablet 11    dapagliflozin (FARXIGA) 10 mg tablet Take 1 tablet (10 mg total) by mouth once daily. (Patient not taking: Reported on 7/10/2023) 90 tablet 3    diclofenac sodium (VOLTAREN) 1 % Gel Apply 2 g topically 3 (three) times daily. Apply to the area of pain 2-3x per day or night as needed (Patient not taking: Reported on 7/10/2023) 100 g 3    EScitalopram oxalate (LEXAPRO) 10 MG tablet Take 1 tablet (10 mg total) by mouth once daily. 90 tablet 2    evolocumab (REPATHA SURECLICK) 140 mg/mL PnIj Inject 1 mL (140 mg total) into the skin every 14 (fourteen) days. (Patient not taking: Reported on 7/10/2023) 2 mL 6    gabapentin (NEURONTIN) 300 MG capsule Take 1 capsule (300 mg) in the morning and 2 capsules (600 mg) in the evening (Patient not taking: Reported on 7/11/2023) 90 capsule 0    glipiZIDE (GLUCOTROL) 10 MG TR24 Take 1 tablet (10 mg total) by mouth daily with breakfast. 90 tablet 3    hydrALAZINE (APRESOLINE) 25 MG tablet Take 1 tablet (25 mg total) by mouth every 12 (twelve) hours. 60 tablet 2    insulin detemir U-100, Levemir, (LEVEMIR FLEXPEN) 100 unit/mL (3 mL) InPn pen Inject 14 Units into the skin every evening. (Patient taking differently: Inject into the skin every evening. Adjusted dose based on glucose) 12 mL 3    isosorbide mononitrate (ISMO,MONOKET) 10 mg tablet Take 1 tablet (10 mg total) by mouth once daily. 90 tablet 2    methocarbamoL (ROBAXIN) 500 MG Tab Take 1 tablet (500 mg total) by mouth 4 (four) times daily. for 10 days 40 tablet 0    metoprolol tartrate (LOPRESSOR) 50 MG tablet Take 50 mg by mouth 2 (two) times daily.      multivitamin (THERAGRAN) per tablet Take 1 tablet by mouth once daily.      oxyCODONE (ROXICODONE) 5 MG immediate release tablet Take 1 tablet (5 mg total) by mouth every 4 (four) hours as needed for Pain. 42 tablet 0    pen needle, diabetic  "(NOVOTWIST) 32 gauge x " Ndle Use 1 needle to inject insulin four times daily 400 each 2    pen needle, diabetic 32 gauge x " Ndle Use as directed 100 each 0        Current Inpatient Medications:      Past Surgical History:   Procedure Laterality Date    ANTEGRADE NEPHROSTOGRAPHY Left 2019    Procedure: Nephrostogram - antegrade;  Surgeon: Robin Boyd MD;  [facility];  Service: Urology;  Laterality: Left;    BRONCHOSCOPY N/A 2019    Procedure: BRONCHOSCOPY;  Surgeon: Sean Ruano MD;  [facility];  Service: General;  Laterality: N/A;    CARDIAC CATHETERIZATION      CATARACT EXTRACTION      cataract extraction left eye      cataracts      CAUDAL EPIDURAL STEROID INJECTION N/A 2019    Procedure: Injection-steroid-epidural-caudal;  Surgeon: Dave Bentley Jr., MD;  Location: [facility] PAIN MGT;  Service: Pain Management;  Laterality: N/A;     SECTION, LOW TRANSVERSE      COLONOSCOPY N/A 2019    Procedure: COLONOSCOPY;  Surgeon: Al Alaniz MD;  [facility]  Service: Endoscopy;  Laterality: N/A;    CORONARY ANGIOPLASTY      CYSTOGRAM N/A 2019    Procedure: CYSTOGRAM INTRAOP;  Surgeon: Robin Boyd MD;  [facility];  Service: Urology;  Laterality: N/A;  1 HOUR    CYSTOSCOPY W/ RETROGRADES Left 2019    Procedure: CYSTOSCOPY, WITH RETROGRADE PYELOGRAM;  Surgeon: Robin Boyd MD;  [facility];  Service: Urology;  Laterality: Left;  fluro    ENDOSCOPIC ULTRASOUND OF UPPER GASTROINTESTINAL TRACT N/A 6/15/2023    Procedure: ULTRASOUND, UPPER GI TRACT, ENDOSCOPIC;  Surgeon: Lisa Kim MD;  Location: [facility] ENDO (2ND FLR);  Service: Endoscopy;  Laterality: N/A;    ERCP N/A 6/15/2023    Procedure: ERCP (ENDOSCOPIC RETROGRADE CHOLANGIOPANCREATOGRAPHY);  Surgeon: Lisa Kim MD;  Location: [facility] ENDO (2ND FLR);  Service: Endoscopy;  Laterality: N/A;    " ESOPHAGOGASTRODUODENOSCOPY W/ PEG  5/2/2019    Procedure: EGD, WITH PEG TUBE INSERTION;  Surgeon: Sean Ruano MD;  Location: [facility] OR 2ND FLR;  Service: General;;    EXCISION TURBINATE, SUBMUCOUS      FLEXIBLE SIGMOIDOSCOPY N/A 5/13/2019    Procedure: SIGMOIDOSCOPY, FLEXIBLE;  Surgeon: ALBERTO Amin MD;  Location: [facility] ENDO (2ND FLR);  Service: Endoscopy;  Laterality: N/A;    FLEXIBLE SIGMOIDOSCOPY N/A 5/21/2019    Procedure: SIGMOIDOSCOPY, FLEXIBLE;  Surgeon: ALBERTO Amin MD;  Location: [facility] ENDO (2ND FLR);  Service: Endoscopy;  Laterality: N/A;    FUSION OF LUMBAR SPINE BY ANTERIOR APPROACH Left 4/12/2019    Procedure: FUSION, SPINE, LUMBAR, ANTERIOR APPROACH Left L5-S1 Anterior to Psoa Interbody Fusion, L5-S1 Posterior Instrumentation;  Surgeon: Mk George MD;  Location: [facility] OR 2ND FLR;  Service: Neurosurgery;  Laterality: Left;  Porcedure:  Left L5-S1 Anterior to Psoa Interbody Fusion, L5-S1 Posterior Instrumentation  Surgery Time: 4 Hrs  LOS: 2-3  Anesthesia: General   Blood: Type & Screen  R    HAND SURGERY Left     HAND SURGERY Right     torn ligament repair/ Dr. Yeboah    HYSTERECTOMY      INJECTION OF STEROID Right 12/10/2020    Procedure: INJECTION, STEROID Right SI Joint Block and Steroid Injection;  Surgeon: Mk George MD;  Location: [facility] OR;  Service: Neurosurgery;  Laterality: Right;  Procedure: Right SI Joint Block and Steroid Injection   SUrgery Time: 30 Min  LOS: 0  Anesthesia: MAC  Radiology: C-arm  Bed: [place] [id number] Poster  Position: Prone    INJECTION OF STEROID Right 9/28/2021    Procedure: INJECTION, STEROID Right SI joint block & steroid injection;  Surgeon: Mk George MD;  Location: [facility] OR;  Service: Neurosurgery;  Laterality: Right;  Procedure: Right SI joint block & steroid injection  Surgery Time: 30m  Anesthesia: Local MAC  Radiology: C-arm  Bed: Regular  Position: Prone    LAPAROSCOPIC CHOLECYSTECTOMY N/A 6/23/2023    Procedure: CHOLECYSTECTOMY,  LAPAROSCOPIC;  Surgeon: Richard Penny MD;  Location: [address];  Service: General;  Laterality: N/A;    left foot surgery      left wrist surgery      LYSIS OF ADHESIONS N/A 2/19/2020    Procedure: LYSIS, ADHESIONS;  Surgeon: Robin Boyd MD;  Location: [address];  Service: Urology;  Laterality: N/A;    NASAL SEPTUM SURGERY  5/7/15    PERCUTANEOUS NEPHROSTOMY Left 4/21/2019    Procedure: Creation, Nephrostomy, Percutaneous;  Surgeon: Karina Surgeon;  Location: [facility];  Service: Anesthesiology;  Laterality: Left;    REPAIR OF URETER  4/12/2019    Procedure: REPAIR, URETER;  Surgeon: Mk George MD;  Location: [address];  Service: Neurosurgery;;    REPLACEMENT OF NEPHROSTOMY TUBE N/A 7/18/2019    Procedure: REPLACEMENT, NEPHROSTOMY TUBE;  Surgeon: [facility] Diagnostic Provider;  Location: [address];  Service: Anesthesiology;  Laterality: N/A;  188    REPLACEMENT OF NEPHROSTOMY TUBE N/A 7/24/2019    Procedure: REPLACEMENT, NEPHROSTOMY TUBE;  Surgeon: [facility] Diagnostic Provider;  Location: [address];  Service: Anesthesiology;  Laterality: N/A;  188    REPLACEMENT OF NEPHROSTOMY TUBE N/A 10/7/2019    Procedure: REPLACEMENT, NEPHROSTOMY TUBE;  Surgeon: [facility] Diagnostic Provider;  Location: [address];  Service: Anesthesiology;  Laterality: N/A;  189    REPLACEMENT OF NEPHROSTOMY TUBE N/A 11/25/2019    Procedure: REPLACEMENT, NEPHROSTOMY TUBE;  Surgeon: [facility] Diagnostic Provider;  Location: [address];  Service: Anesthesiology;  Laterality: N/A;  Room 188/Bessy    REPLACEMENT OF NEPHROSTOMY TUBE Right 2/19/2020    Procedure: REPLACEMENT, NEPHROSTOMY TUBE removal removal;  Surgeon: Robin Boyd MD;  Location: [address];  Service: Urology;  Laterality: Right;    rt elbow surgery      S/P LAD COATED STENT  05/14/2010    6 total     S/P OM1 STENT  08/2003    SINUS SURGERY      F.E.S.S.    TRACHEOSTOMY N/A 5/2/2019    Procedure: CREATION, TRACHEOSTOMY;  Surgeon:  Sean Ruano MD;  Location: [facility] OR [address] [place] [address];  Service: General;  Laterality: N/A;    TUBAL LIGATION      URETERAL REIMPLANTION Left 2/19/2020    Procedure: REIMPLANTATION, URETER WITH PSOAS HITCH;  Surgeon: Robin Boyd MD;  [facility];  Service: Urology;  Laterality: Left;       Social History     Socioeconomic History    Marital status:      Spouse name: Shamir    Number of children: 2   Occupational History    Occupation: [employer]     Employer: [employer]     Employer: [employer]     Employer: [employer]   Tobacco Use    Smoking status: Never    Smokeless tobacco: Never   Substance and Sexual Activity    Alcohol use: No     Alcohol/week: 0.0 standard drinks    Drug use: No    Sexual activity: Yes     Partners: Male     Birth control/protection: Post-menopausal     Comment:    Other Topics Concern    Are you pregnant or think you may be? No    Breast-feeding No   Social History Narrative    . 2 children.      Social Determinants of Health     Financial Resource Strain: Low Risk     Difficulty of Paying Living Expenses: Not hard at all   Food Insecurity: No Food Insecurity    Worried About Running Out of Food in the Last Year: Never true    Ran Out of Food in the Last Year: Never true   Transportation Needs: No Transportation Needs    Lack of Transportation (Medical): No    Lack of Transportation (Non-Medical): No   Physical Activity: Sufficiently Active    Days of Exercise per Week: 4 days    Minutes of Exercise per Session: 60 min   Stress: Stress Concern Present    Feeling of Stress : To some extent   Social Connections: Moderately Isolated    Frequency of Communication with Friends and Family: Once a week    Frequency of Social Gatherings with Friends and Family: Once a week    Attends Rastafarian Services: More than 4 times per year    Active Member of Clubs or Organizations: No    Attends Club or  Organization Meetings: Never    Marital Status:    Housing Stability: Low Risk     Unable to Pay for Housing in the Last Year: No    Number of Places Lived in the Last Year: 1    Unstable Housing in the Last Year: No       OBJECTIVE:     Weight:  Wt Readings from Last 1 Encounters:   07/12/23 63.5 kg (140 lb)     There is no height or weight on file to calculate BMI.    Recent Blood Pressure Readings:  BP Readings from Last 3 Encounters:   07/12/23 (!) 154/74   07/10/23 112/65   06/30/23 (!) 150/71       Vital Signs Range (Last 24H):  Temp:  [36.8 °C (98.2 °F)]   Pulse:  [74]   Resp:  [18]   BP: (154)/(74)   SpO2:  [98 %]       CBC:   Lab Results   Component Value Date    WBC 11.79 07/10/2023    HGB 8.4 (L) 07/10/2023    HCT 28.2 (L) 07/10/2023    MCV 86 07/10/2023     (H) 07/10/2023       CMP:     Chemistry        Component Value Date/Time     07/10/2023 1450    K 4.1 07/10/2023 1450    CL 95 07/10/2023 1450    CO2 27 07/10/2023 1450    BUN 24 (H) 07/10/2023 1450    CREATININE 1.1 07/10/2023 1450     (H) 07/10/2023 1450        Component Value Date/Time    CALCIUM 10.2 07/10/2023 1450    ALKPHOS 1,309 (H) 07/10/2023 1450     (H) 07/10/2023 1450    ALT 77 (H) 07/10/2023 1450    BILITOT 0.9 07/10/2023 1450    ESTGFRAFRICA >60.0 06/14/2022 1023    EGFRNONAA 54.3 (A) 06/14/2022 1023            INR:  Lab Results   Component Value Date    INR 1.0 06/30/2023    INR 1.2 06/13/2023    INR 0.9 10/17/2022       Diagnostic Studies:      EKG:    Results for orders placed or performed during the hospital encounter of 06/09/23   EKG 12-lead    Collection Time: 06/12/23  8:18 AM    Narrative    Test Reason : E87.5,    Vent. Rate : 077 BPM     Atrial Rate : 077 BPM     P-R Int : 164 ms          QRS Dur : 064 ms      QT Int : 392 ms       P-R-T Axes : 043 -28 -11 degrees     QTc Int : 443 ms    Normal sinus rhythm  Nonspecific ST-T abn  Borderline Abnormal ECG  When compared with ECG of  23-AUG-2022 09:43,  Criteria for Septal infarct are no longer Present  T wave inversion now evident in Inferior leads  Nonspecific T wave abnormality, improved in Lateral leads  Confirmed by Peter BROWN MD ([id number]) on 6/12/2023 9:08:44 AM    Referred By: [email or web address]   SELF           Confirmed By:Peter BROWN MD       2D Echo:    No results found. However, due to the size of the patient record, not all encounters were searched. Please check Results Review for a complete set of results.    Results for orders placed or performed during the hospital encounter of 05/25/23   Echo   Result Value Ref Range    BSA 1.79 m2    TDI SEPTAL 0.06 m/s    LV LATERAL E/E' RATIO 9.43 m/s    LV SEPTAL E/E' RATIO 11.00 m/s    LA WIDTH 3.64 cm    TDI LATERAL 0.07 m/s    LVIDd 5.17 3.5 - 6.0 cm    IVS 0.89 0.6 - 1.1 cm    Posterior Wall 0.86 0.6 - 1.1 cm    LVIDs 3.64 2.1 - 4.0 cm    FS 30 28 - 44 %    LA volume 56.82 cm3    Sinus 2.80 cm    STJ 2.38 cm    Ascending aorta 2.90 cm    LV mass 161.33 g    LA size 3.85 cm    RVDD 2.87 cm    TAPSE 1.61 cm    Left Ventricle Relative Wall Thickness 0.33 cm    AV mean gradient 3 mmHg    AV valve area 2.40 cm2    AV Velocity Ratio 0.82     AV index (prosthetic) 0.75     MV valve area p 1/2 method 4.65 cm2    E/A ratio 0.89     Mean e' 0.07 m/s    E wave deceleration time 163.28 msec    LVOT diameter 2.02 cm    LVOT area 3.2 cm2    LVOT peak canelo 0.91 m/s    LVOT peak VTI 18.56 cm    Ao peak canelo 1.11 m/s    Ao VTI 24.73 cm    LVOT stroke volume 59.45 cm3    AV peak gradient 5 mmHg    E/E' ratio 10.15 m/s    MV Peak E Canelo 0.66 m/s    MV stenosis pressure 1/2 time 47.35 ms    MV Peak A Canelo 0.74 m/s    LV Systolic Volume 56.03 mL    LV Systolic Volume Index 31.8 mL/m2    LV Diastolic Volume 128.05 mL    LV Diastolic Volume Index 72.76 mL/m2    LA Volume Index 32.3 mL/m2    LV Mass Index 92 g/m2    RA Major Axis 3.89 cm    Left Atrium Minor Axis 4.79 cm    Left Atrium Major Axis 4.75 cm    LA Volume Index  (Mod) 26.3 mL/m2    LA volume (mod) 46.30 cm3    RA Width 2.66 cm    Right Atrial Pressure (from IVC) 3 mmHg    QEF 52 %    EF 55 %    Narrative    · The estimated ejection fraction is 55%.  · The quantitatively derived ejection fraction is 52%.  · Normal right ventricular size with normal right ventricular systolic   function.  · Normal left ventricular diastolic function.  · The left ventricle is normal in size with normal systolic function.  · Normal central venous pressure (3 mmHg).          No results found. However, due to the size of the patient record, not all encounters were searched. Please check Results Review for a complete set of results.      ASSESSMENT/PLAN:           Pre-op Assessment    I have reviewed the Patient Summary Reports.     I have reviewed the Nursing Notes. I have reviewed the NPO Status.   I have reviewed the Medications.     Review of Systems  Anesthesia Hx:  Denies Family Hx of Anesthesia complications.   Denies Personal Hx of Anesthesia complications.   Social:  No Alcohol Use, Non-Smoker    Cardiovascular:   Hypertension Past MI CAD  CABG/stent  CHF    Pulmonary:   Denies COPD. Asthma    Hepatic/GI:   PUD,    Musculoskeletal:   Arthritis     Neurological:   Neuromuscular Disease,    Endocrine:   Diabetes  Denies Obesity / BMI > 30      Physical Exam  General: Well nourished, Cooperative, Alert and Oriented    Airway:  Mallampati: II   Mouth Opening: Normal  TM Distance: Normal  Tongue: Normal  Neck ROM: Normal ROM    Dental:  Intact        Anesthesia Plan  Type of Anesthesia, risks & benefits discussed:    Anesthesia Type: Gen ETT  Intra-op Monitoring Plan: Standard ASA Monitors  Post Op Pain Control Plan: multimodal analgesia and IV/PO Opioids PRN  Induction:  IV  Airway Plan: Video and Direct  Informed Consent: Informed consent signed with the Patient and all parties understand the risks and agree with anesthesia plan.  All questions answered.   ASA Score: 3  Day of Surgery Review  of History & Physical: H&P Update referred to the surgeon/provider.    Ready For Surgery From Anesthesia Perspective.     .

## 2023-07-12 NOTE — ANESTHESIA PROCEDURE NOTES
Intubation    Date/Time: 7/12/2023 11:40 AM  Performed by: Marybeth Bro CRNA  Authorized by: Marybeth Bro CRNA     Intubation:     Induction:  Inhalational - mask    Intubated:  Postinduction    Mask Ventilation:  Easy mask    Attempts:  1    Attempted By:  CRNA    Method of Intubation:  Video laryngoscopy    Blade:  Shahid 2    Laryngeal View Grade: Grade I - full view of cords      Difficult Airway Encountered?: No      Complications:  None    Airway Device:  Oral endotracheal tube    Airway Device Size:  7.5    Style/Cuff Inflation:  Cuffed    Inflation Amount (mL):  6    Tube secured:  21    Secured at:  The lips    Placement Verified By:  Colorimetric ETCO2 device    Complicating Factors:  None    Findings Post-Intubation:  BS equal bilateral

## 2023-07-13 ENCOUNTER — TELEPHONE (OUTPATIENT)
Dept: INTERNAL MEDICINE | Facility: CLINIC | Age: 63
End: 2023-07-13
Payer: COMMERCIAL

## 2023-07-13 NOTE — ANESTHESIA POSTPROCEDURE EVALUATION
Anesthesia Post Evaluation    Patient: Mariann Huff    Procedure(s) Performed: Procedure(s) (LRB):  ORIF, FRACTURE, HUMERUS, PROXIMAL  (Left)    Final Anesthesia Type: general      Patient location during evaluation: PACU  Patient participation: Yes- Able to Participate  Level of consciousness: awake and alert and oriented  Post-procedure vital signs: reviewed and stable  Pain management: adequate  Airway patency: patent    PONV status at discharge: No PONV  Anesthetic complications: no      Cardiovascular status: hemodynamically stable  Respiratory status: nasal cannula  Hydration status: euvolemic  Follow-up not needed.          Vitals Value Taken Time   /55 07/12/23 1635   Temp 36.3 °C (97.3 °F) 07/12/23 1635   Pulse 75 07/12/23 1635   Resp 20 07/12/23 1635   SpO2 96 % 07/12/23 1635         No case tracking events are documented in the log.      Pain/Derick Score: Pain Rating Prior to Med Admin: 10 (7/12/2023  3:30 PM)  Derick Score: 10 (7/12/2023  4:35 PM)

## 2023-07-13 NOTE — TELEPHONE ENCOUNTER
----- Message from Jasbir Haney MD sent at 7/12/2023  1:31 PM CDT -----  Thank you Dr. Marin.    Elaine, can we get her scheduled for a hospital follow-up?  Can be virtual.    Jasbir Haney    ----- Message -----  From: Mayra Marin MD  Sent: 7/11/2023   7:17 PM CDT  To: MD Jasbir Traylor     I saw this lady for perioperative care yesterday in preparation for humerus fracture surgery tomorrow    I wanted to let you know about  her abnormal liver function test   She recently had gallbladder removal     Alk-phos is elevated which is likely from a bony source- fracture    I see that she has an up coming lab appointment with you.  Appreciate if you could follow her liver function tests    Thank you

## 2023-07-14 ENCOUNTER — EXTERNAL HOME HEALTH (OUTPATIENT)
Dept: HOME HEALTH SERVICES | Facility: HOSPITAL | Age: 63
End: 2023-07-14
Payer: COMMERCIAL

## 2023-07-14 NOTE — CARE UPDATE
Called patient for POD2 [facility] check in but no answer. Left voicemail with signs of LAST as well as call back information for both us and [facility] hotline. Will call again on POD4.

## 2023-07-16 NOTE — ANESTHESIA POST-OP PAIN MANAGEMENT
Attempted to call Mariann Huff at 10:40 AM on 07/16/2023 regarding the PNC and Nimbus pump and the remind them that the PNC should be removed tomorrow.  Voicemail was left encouraging the patient to call with any problems or questions.      Francisco Lyon, DO  PGY IV Anesthesiology   [facility]

## 2023-07-16 NOTE — ADDENDUM NOTE
Addendum  created 07/16/23 0841 by Francisco Lyon, DO    Clinical Note Signed, Pend clinical note

## 2023-07-16 NOTE — ADDENDUM NOTE
Addendum  created 07/16/23 0842 by Francisco Lyon DO    Clinical Note Signed, Intraprocedure Event edited

## 2023-07-17 ENCOUNTER — PATIENT OUTREACH (OUTPATIENT)
Dept: ADMINISTRATIVE | Facility: HOSPITAL | Age: 63
End: 2023-07-17
Payer: COMMERCIAL

## 2023-07-17 NOTE — TELEPHONE ENCOUNTER
Outgoing call to patient regarding Repatha refill. She is not sure if she will be resuming therapy as of yet. She stated she should know after the 20th. Pending call.

## 2023-07-17 NOTE — PROGRESS NOTES
Health Maintenance Due   Topic Date Due    Shingles Vaccine (1 of 2) Never done    Pneumococcal Vaccines (Age 0-64) (2 - PCV) 12/06/2020    Foot Exam  04/20/2022    Eye Exam  02/24/2023     Chart reviewed.   Immunizations: Reconciled  Orders placed: N/A  Upcoming appts to satisfy JENNIFER topics: Labs 7/20/2023

## 2023-07-24 ENCOUNTER — TELEPHONE (OUTPATIENT)
Dept: ENDOSCOPY | Facility: HOSPITAL | Age: 63
End: 2023-07-24
Payer: COMMERCIAL

## 2023-07-25 ENCOUNTER — PATIENT MESSAGE (OUTPATIENT)
Dept: ENDOSCOPY | Facility: HOSPITAL | Age: 63
End: 2023-07-25
Payer: COMMERCIAL

## 2023-07-25 ENCOUNTER — TELEPHONE (OUTPATIENT)
Dept: ENDOSCOPY | Facility: HOSPITAL | Age: 63
End: 2023-07-25
Payer: COMMERCIAL

## 2023-07-25 ENCOUNTER — OFFICE VISIT (OUTPATIENT)
Dept: ORTHOPEDICS | Facility: CLINIC | Age: 63
End: 2023-07-25
Payer: COMMERCIAL

## 2023-07-25 ENCOUNTER — HOSPITAL ENCOUNTER (OUTPATIENT)
Dept: RADIOLOGY | Facility: HOSPITAL | Age: 63
Discharge: HOME | End: 2023-07-25
Attending: NURSE PRACTITIONER
Payer: COMMERCIAL

## 2023-07-25 DIAGNOSIS — Z98.890 POST-OPERATIVE STATE: ICD-10-CM

## 2023-07-25 DIAGNOSIS — S42.202D CLOSED FRACTURE OF PROXIMAL END OF LEFT HUMERUS WITH ROUTINE HEALING, UNSPECIFIED FRACTURE MORPHOLOGY, SUBSEQUENT ENCOUNTER: Primary | ICD-10-CM

## 2023-07-25 DIAGNOSIS — S42.202A CLOSED FRACTURE OF PROXIMAL END OF LEFT HUMERUS, UNSPECIFIED FRACTURE MORPHOLOGY, INITIAL ENCOUNTER: Primary | ICD-10-CM

## 2023-07-25 DIAGNOSIS — S42.202A CLOSED FRACTURE OF PROXIMAL END OF LEFT HUMERUS, UNSPECIFIED FRACTURE MORPHOLOGY, INITIAL ENCOUNTER: ICD-10-CM

## 2023-07-25 PROCEDURE — 1159F MED LIST DOCD IN RCRD: CPT | Mod: CPTII,S$GLB,, | Performed by: NURSE PRACTITIONER

## 2023-07-25 PROCEDURE — 3052F PR MOST RECENT HEMOGLOBIN A1C LEVEL 8.0 - < 9.0%: ICD-10-PCS | Mod: CPTII,S$GLB,, | Performed by: NURSE PRACTITIONER

## 2023-07-25 PROCEDURE — 3066F PR DOCUMENTATION OF TREATMENT FOR NEPHROPATHY: ICD-10-PCS | Mod: CPTII,S$GLB,, | Performed by: NURSE PRACTITIONER

## 2023-07-25 PROCEDURE — 99024 POSTOP FOLLOW-UP VISIT: CPT | Mod: S$GLB,,, | Performed by: NURSE PRACTITIONER

## 2023-07-25 PROCEDURE — 1160F RVW MEDS BY RX/DR IN RCRD: CPT | Mod: CPTII,S$GLB,, | Performed by: NURSE PRACTITIONER

## 2023-07-25 PROCEDURE — 73030 XR SHOULDER COMPLETE 2 OR MORE VIEWS LEFT: ICD-10-PCS | Mod: 26,LT,, | Performed by: RADIOLOGY

## 2023-07-25 PROCEDURE — 1159F PR MEDICATION LIST DOCUMENTED IN MEDICAL RECORD: ICD-10-PCS | Mod: CPTII,S$GLB,, | Performed by: NURSE PRACTITIONER

## 2023-07-25 PROCEDURE — 4010F PR ACE/ARB THEARPY RXD/TAKEN: ICD-10-PCS | Mod: CPTII,S$GLB,, | Performed by: NURSE PRACTITIONER

## 2023-07-25 PROCEDURE — 3061F PR NEG MICROALBUMINURIA RESULT DOCUMENTED/REVIEW: ICD-10-PCS | Mod: CPTII,S$GLB,, | Performed by: NURSE PRACTITIONER

## 2023-07-25 PROCEDURE — 99999 PR PBB SHADOW E&M-EST. PATIENT-LVL IV: CPT | Mod: PBBFAC,,, | Performed by: NURSE PRACTITIONER

## 2023-07-25 PROCEDURE — 99024 PR POST-OP FOLLOW-UP VISIT: ICD-10-PCS | Mod: S$GLB,,, | Performed by: NURSE PRACTITIONER

## 2023-07-25 PROCEDURE — 3052F HG A1C>EQUAL 8.0%<EQUAL 9.0%: CPT | Mod: CPTII,S$GLB,, | Performed by: NURSE PRACTITIONER

## 2023-07-25 PROCEDURE — 3066F NEPHROPATHY DOC TX: CPT | Mod: CPTII,S$GLB,, | Performed by: NURSE PRACTITIONER

## 2023-07-25 PROCEDURE — 1160F PR REVIEW ALL MEDS BY PRESCRIBER/CLIN PHARMACIST DOCUMENTED: ICD-10-PCS | Mod: CPTII,S$GLB,, | Performed by: NURSE PRACTITIONER

## 2023-07-25 PROCEDURE — 4010F ACE/ARB THERAPY RXD/TAKEN: CPT | Mod: CPTII,S$GLB,, | Performed by: NURSE PRACTITIONER

## 2023-07-25 PROCEDURE — 73030 X-RAY EXAM OF SHOULDER: CPT | Mod: 26,LT,, | Performed by: RADIOLOGY

## 2023-07-25 PROCEDURE — 3061F NEG MICROALBUMINURIA REV: CPT | Mod: CPTII,S$GLB,, | Performed by: NURSE PRACTITIONER

## 2023-07-25 PROCEDURE — 73030 X-RAY EXAM OF SHOULDER: CPT | Mod: TC,LT

## 2023-07-25 PROCEDURE — 99999 PR PBB SHADOW E&M-EST. PATIENT-LVL IV: ICD-10-PCS | Mod: PBBFAC,,, | Performed by: NURSE PRACTITIONER

## 2023-07-25 NOTE — PROGRESS NOTES
Ms. Huff is here today for a post-operative visit after undergoing an ORIF of her left proximal humerus fracture with IM nail by Dr. Leary on 7/12/2023.    Interval History:  She reports that she is doing ok.  Pain is tolerable.  She is taking pain medication.  She denies falls or injuries since her surgery.  She denies fever, chills, and sweats since the time of the surgery.     Physical exam:  Dressing taken down.  Incision is clean, dry and intact.  No signs of infection noted.  Skin was closed with Dermabond and Stratifix ends were clipped.  She has tactile stimulation to their lower FA.  Cap refill < 3 secs and radial pulse is 2+.  She can flex and extend her thumb, abduct/adduct her fingers.  Perform ok sign.  ROM of wrist is 30 degrees flexion and extension.  ROM of elbow is  degrees and ROM of shoulder is 0-95 degrees.       RADS: Left shoulder xray was obtained and personally reviewed by me, findings show IM nail appears to be in good position.  No evidence of hardware failure.  Fracture to humerus still seen and appears stable.  No new fractures seen.    Assessment:  Post-op visit (2 weeks)    Plan:    ICD-10-CM ICD-9-CM   1. Closed fracture of proximal end of left humerus with routine healing, unspecified fracture morphology, subsequent encounter s/p ORIF IMN 7/12/23  S42.202D V54.11   2. Post-operative state  Z98.890 V45.89     Current care, treatment plan, precautions, activity level/ modifications, limitations, rehabilitation exercises and proposed future treatment were discussed with the patient. We discussed the need to monitor for changes in symptoms and condition and report them to the physician.  Discussed importance of compliance with all appointments and follow up examinations.     WOUND CARE ORDERS  -Orlester was advised to keep the incision clean and dry for the next 24 hours after which she may wash the area with antibacterial soap in the shower.   -She not submerge until the  incision is completely healed  -Patient was advised to monitor wound closely and multiple times daily for any problems. Call clinic immediately or report to ED for immediate medical attention for any complications including reopening of wound, drainage, purulence, redness, streaking, odor, pain out of proportion, fever, chills, etc.   - If there are signs of infection, please call [facility] [phone] for further instructions and to make appt to be seen.       PHYSICAL THERAPY:   - Refer to Outpatient PT with [facility].  - Weight bearing - NWB to LUE x 8 weeks from surgery  - Range of motion as tolerated.   - Sling for comfort.     PAIN MEDICATION:   - Pain medication: refill was not needed  - Pain medication refill policy provided to patient for review, yes.    - Patient was informed a multi-modal approach is used to treat their pain.     DVT PROPHYLAXIS:   -  Eliquis long-term management    FRAGILITY:  - I will refer to fracture clinic.     FOLLOW UP:   - Patient will follow up in the clinic in 4 weeks.  - X-ray of her left shoulder is needed.    - Future Appointments:   Future Appointments   Date Time Provider Department Center   7/27/2023 11:00 AM Adelaide Aguilar MD [facility] CARDIO [place]   7/31/2023 10:00 AM Jasbir Haney MD [facility] IM [place]   8/22/2023 10:00 AM Lul Bowser NP [facility] ORTHO [place]           If there are any questions prior to scheduled follow up, the patient was instructed to contact the office

## 2023-07-25 NOTE — TELEPHONE ENCOUNTER
Spoke to patient to schedule procedure(s) ERCP        Physician to perform procedure(s) Dr. OZZY Kim  Date of Procedure (s) 8/17/23  Arrival Time 7:00 AM  Time of Procedure(s) 8:00 AM   Location of Procedure(s) [place] [address]nd Floor  Type of Rx Prep sent to patient: Other  Instructions provided to patient via [facility]    Patient was informed on the following information and verbalized understanding. Screening questionnaire reviewed with patient and complete. If procedure requires anesthesia, a responsible adult needs to be present to accompany the patient home, patient cannot drive after receiving anesthesia. Appointment details are tentative, especially check-in time. Patient will receive a prep-op call 4 days prior to confirm check-in time for procedure. If applicable the patient should contact their pharmacy to verify Rx for procedure prep is ready for pick-up. Patient was advised to call the scheduling department at [phone] if pharmacy states no Rx is available. Patient was advised to call the endoscopy scheduling department if any questions or concerns arise.      [facility] Scheduling Department

## 2023-07-26 NOTE — TELEPHONE ENCOUNTER
My Open Encounters  (Newest Message First)   Adelaide Aguilar MD  You 16 hours ago (4:00 PM)       Yes you can and er=restart ASAP. Pletal can be held for 5 days and Eliquis for two days prior to the procedure..       You routed conversation to Adelaide Aguilar MD; Jeff KRAUSE Staff 16 hours ago (3:57 PM)     You 16 hours ago (3:57 PM)     TB  Pt scheduled for ercp on 8/17/23. Can we hold eliquis and pletal x2 days per protocol?please advise         Note      
Pt scheduled for ercp on 8/17/23. Can we hold eliquis and pletal x2 days per protocol?please advise  
16

## 2023-08-02 ENCOUNTER — HOSPITAL ENCOUNTER (INPATIENT)
Facility: HOSPITAL | Age: 63
LOS: 5 days | Discharge: HOME | DRG: 280 | End: 2023-08-07
Attending: EMERGENCY MEDICINE | Admitting: STUDENT IN AN ORGANIZED HEALTH CARE EDUCATION/TRAINING PROGRAM
Payer: COMMERCIAL

## 2023-08-02 DIAGNOSIS — R00.1 BRADYCARDIA: ICD-10-CM

## 2023-08-02 DIAGNOSIS — K86.89 PANCREATIC MASS: ICD-10-CM

## 2023-08-02 DIAGNOSIS — K76.9 LIVER LESION: ICD-10-CM

## 2023-08-02 DIAGNOSIS — D64.9 ANEMIA, UNSPECIFIED TYPE: ICD-10-CM

## 2023-08-02 DIAGNOSIS — I25.2 HISTORY OF NON-ST ELEVATION MYOCARDIAL INFARCTION (NSTEMI): Chronic | ICD-10-CM

## 2023-08-02 DIAGNOSIS — G47.30 SLEEP APNEA, UNSPECIFIED TYPE: Chronic | ICD-10-CM

## 2023-08-02 DIAGNOSIS — I21.4 NSTEMI (NON-ST ELEVATED MYOCARDIAL INFARCTION): Primary | ICD-10-CM

## 2023-08-02 DIAGNOSIS — R07.9 CHEST PAIN: ICD-10-CM

## 2023-08-02 DIAGNOSIS — R10.13 EPIGASTRIC PAIN: ICD-10-CM

## 2023-08-02 DIAGNOSIS — Z00.00 HEALTHCARE MAINTENANCE: ICD-10-CM

## 2023-08-02 LAB
ABO + RH BLD: NORMAL
ALBUMIN SERPL BCP-MCNC: 2.2 G/DL (ref 3.5–5.2)
ALP SERPL-CCNC: 982 U/L (ref 55–135)
ALT SERPL W/O P-5'-P-CCNC: 25 U/L (ref 10–44)
ANION GAP SERPL CALC-SCNC: 17 MMOL/L (ref 8–16)
ANISOCYTOSIS BLD QL SMEAR: SLIGHT
APTT PPP: 27 SEC (ref 21–32)
AST SERPL-CCNC: 29 U/L (ref 10–40)
BASOPHILS # BLD AUTO: 0.01 K/UL (ref 0–0.2)
BASOPHILS # BLD AUTO: 0.03 K/UL (ref 0–0.2)
BASOPHILS # BLD AUTO: 0.03 K/UL (ref 0–0.2)
BASOPHILS NFR BLD: 0.1 % (ref 0–1.9)
BASOPHILS NFR BLD: 0.3 % (ref 0–1.9)
BASOPHILS NFR BLD: 0.4 % (ref 0–1.9)
BILIRUB SERPL-MCNC: 0.6 MG/DL (ref 0.1–1)
BLD GP AB SCN CELLS X3 SERPL QL: NORMAL
BUN SERPL-MCNC: 19 MG/DL (ref 8–23)
CALCIUM SERPL-MCNC: 9.7 MG/DL (ref 8.7–10.5)
CHLORIDE SERPL-SCNC: 98 MMOL/L (ref 95–110)
CO2 SERPL-SCNC: 26 MMOL/L (ref 23–29)
CREAT SERPL-MCNC: 1.1 MG/DL (ref 0.5–1.4)
D DIMER PPP IA.FEU-MCNC: 4.58 MG/L FEU
DIFFERENTIAL METHOD: ABNORMAL
EOSINOPHIL # BLD AUTO: 0 K/UL (ref 0–0.5)
EOSINOPHIL NFR BLD: 0.2 % (ref 0–8)
EOSINOPHIL NFR BLD: 0.3 % (ref 0–8)
EOSINOPHIL NFR BLD: 0.3 % (ref 0–8)
ERYTHROCYTE [DISTWIDTH] IN BLOOD BY AUTOMATED COUNT: 15.5 % (ref 11.5–14.5)
ERYTHROCYTE [DISTWIDTH] IN BLOOD BY AUTOMATED COUNT: 15.7 % (ref 11.5–14.5)
ERYTHROCYTE [DISTWIDTH] IN BLOOD BY AUTOMATED COUNT: 15.9 % (ref 11.5–14.5)
EST. GFR  (NO RACE VARIABLE): 56.8 ML/MIN/1.73 M^2
GIANT PLATELETS BLD QL SMEAR: PRESENT
GLUCOSE SERPL-MCNC: 290 MG/DL (ref 70–110)
HCT VFR BLD AUTO: 18.9 % (ref 37–48.5)
HCT VFR BLD AUTO: 24.4 % (ref 37–48.5)
HCT VFR BLD AUTO: 26.7 % (ref 37–48.5)
HCV AB SERPL QL IA: NORMAL
HGB BLD-MCNC: 5.4 G/DL (ref 12–16)
HGB BLD-MCNC: 7.3 G/DL (ref 12–16)
HGB BLD-MCNC: 7.7 G/DL (ref 12–16)
HIV 1+2 AB+HIV1 P24 AG SERPL QL IA: NORMAL
HYPOCHROMIA BLD QL SMEAR: ABNORMAL
IMM GRANULOCYTES # BLD AUTO: 0.02 K/UL (ref 0–0.04)
IMM GRANULOCYTES # BLD AUTO: 0.02 K/UL (ref 0–0.04)
IMM GRANULOCYTES # BLD AUTO: 0.04 K/UL (ref 0–0.04)
IMM GRANULOCYTES NFR BLD AUTO: 0.3 % (ref 0–0.5)
IMM GRANULOCYTES NFR BLD AUTO: 0.3 % (ref 0–0.5)
IMM GRANULOCYTES NFR BLD AUTO: 0.4 % (ref 0–0.5)
INR PPP: 1.2 (ref 0.8–1.2)
LIPASE SERPL-CCNC: 69 U/L (ref 4–60)
LYMPHOCYTES # BLD AUTO: 1 K/UL (ref 1–4.8)
LYMPHOCYTES # BLD AUTO: 1.6 K/UL (ref 1–4.8)
LYMPHOCYTES # BLD AUTO: 2.2 K/UL (ref 1–4.8)
LYMPHOCYTES NFR BLD: 12.9 % (ref 18–48)
LYMPHOCYTES NFR BLD: 21.5 % (ref 18–48)
LYMPHOCYTES NFR BLD: 22.3 % (ref 18–48)
MCH RBC QN AUTO: 24.2 PG (ref 27–31)
MCH RBC QN AUTO: 24.4 PG (ref 27–31)
MCH RBC QN AUTO: 24.8 PG (ref 27–31)
MCHC RBC AUTO-ENTMCNC: 28.6 G/DL (ref 32–36)
MCHC RBC AUTO-ENTMCNC: 28.8 G/DL (ref 32–36)
MCHC RBC AUTO-ENTMCNC: 29.9 G/DL (ref 32–36)
MCV RBC AUTO: 83 FL (ref 82–98)
MCV RBC AUTO: 85 FL (ref 82–98)
MCV RBC AUTO: 85 FL (ref 82–98)
MONOCYTES # BLD AUTO: 0.4 K/UL (ref 0.3–1)
MONOCYTES # BLD AUTO: 0.5 K/UL (ref 0.3–1)
MONOCYTES # BLD AUTO: 0.7 K/UL (ref 0.3–1)
MONOCYTES NFR BLD: 5 % (ref 4–15)
MONOCYTES NFR BLD: 6.4 % (ref 4–15)
MONOCYTES NFR BLD: 6.6 % (ref 4–15)
NEUTROPHILS # BLD AUTO: 4.9 K/UL (ref 1.8–7.7)
NEUTROPHILS # BLD AUTO: 6.2 K/UL (ref 1.8–7.7)
NEUTROPHILS # BLD AUTO: 7.2 K/UL (ref 1.8–7.7)
NEUTROPHILS NFR BLD: 70.6 % (ref 38–73)
NEUTROPHILS NFR BLD: 71 % (ref 38–73)
NEUTROPHILS NFR BLD: 81.1 % (ref 38–73)
NRBC BLD-RTO: 0 /100 WBC
OVALOCYTES BLD QL SMEAR: ABNORMAL
PLATELET # BLD AUTO: 348 K/UL (ref 150–450)
PLATELET # BLD AUTO: 503 K/UL (ref 150–450)
PLATELET # BLD AUTO: 512 K/UL (ref 150–450)
PLATELET BLD QL SMEAR: ABNORMAL
PMV BLD AUTO: 10.7 FL (ref 9.2–12.9)
PMV BLD AUTO: 11.1 FL (ref 9.2–12.9)
PMV BLD AUTO: 11.1 FL (ref 9.2–12.9)
POCT GLUCOSE: 188 MG/DL (ref 70–110)
POIKILOCYTOSIS BLD QL SMEAR: SLIGHT
POLYCHROMASIA BLD QL SMEAR: ABNORMAL
POTASSIUM SERPL-SCNC: 4.3 MMOL/L (ref 3.5–5.1)
PROT SERPL-MCNC: 8.3 G/DL (ref 6–8.4)
PROTHROMBIN TIME: 12.4 SEC (ref 9–12.5)
RBC # BLD AUTO: 2.23 M/UL (ref 4–5.4)
RBC # BLD AUTO: 2.94 M/UL (ref 4–5.4)
RBC # BLD AUTO: 3.16 M/UL (ref 4–5.4)
SODIUM SERPL-SCNC: 141 MMOL/L (ref 136–145)
SPECIMEN OUTDATE: NORMAL
TROPONIN I SERPL DL<=0.01 NG/ML-MCNC: 0.31 NG/ML (ref 0–0.03)
TROPONIN I SERPL DL<=0.01 NG/ML-MCNC: 2.23 NG/ML (ref 0–0.03)
WBC # BLD AUTO: 10.15 K/UL (ref 3.9–12.7)
WBC # BLD AUTO: 6.99 K/UL (ref 3.9–12.7)
WBC # BLD AUTO: 7.58 K/UL (ref 3.9–12.7)

## 2023-08-02 PROCEDURE — 80061 LIPID PANEL: CPT | Performed by: STUDENT IN AN ORGANIZED HEALTH CARE EDUCATION/TRAINING PROGRAM

## 2023-08-02 PROCEDURE — 85730 THROMBOPLASTIN TIME PARTIAL: CPT | Performed by: PHYSICIAN ASSISTANT

## 2023-08-02 PROCEDURE — 63600175 PHARM REV CODE 636 W HCPCS: Performed by: STUDENT IN AN ORGANIZED HEALTH CARE EDUCATION/TRAINING PROGRAM

## 2023-08-02 PROCEDURE — 83880 ASSAY OF NATRIURETIC PEPTIDE: CPT | Performed by: STUDENT IN AN ORGANIZED HEALTH CARE EDUCATION/TRAINING PROGRAM

## 2023-08-02 PROCEDURE — 25000003 PHARM REV CODE 250: Performed by: STUDENT IN AN ORGANIZED HEALTH CARE EDUCATION/TRAINING PROGRAM

## 2023-08-02 PROCEDURE — 25000003 PHARM REV CODE 250: Performed by: PHYSICIAN ASSISTANT

## 2023-08-02 PROCEDURE — 99222 1ST HOSP IP/OBS MODERATE 55: CPT | Mod: ,,, | Performed by: INTERNAL MEDICINE

## 2023-08-02 PROCEDURE — 96361 HYDRATE IV INFUSION ADD-ON: CPT

## 2023-08-02 PROCEDURE — 99291 CRITICAL CARE FIRST HOUR: CPT | Mod: ,,, | Performed by: STUDENT IN AN ORGANIZED HEALTH CARE EDUCATION/TRAINING PROGRAM

## 2023-08-02 PROCEDURE — 84484 ASSAY OF TROPONIN QUANT: CPT | Mod: 91 | Performed by: PHYSICIAN ASSISTANT

## 2023-08-02 PROCEDURE — 93010 EKG 12-LEAD: ICD-10-PCS | Mod: ,,, | Performed by: INTERNAL MEDICINE

## 2023-08-02 PROCEDURE — 85610 PROTHROMBIN TIME: CPT | Performed by: PHYSICIAN ASSISTANT

## 2023-08-02 PROCEDURE — 25500020 PHARM REV CODE 255: Performed by: STUDENT IN AN ORGANIZED HEALTH CARE EDUCATION/TRAINING PROGRAM

## 2023-08-02 PROCEDURE — 86920 COMPATIBILITY TEST SPIN: CPT | Performed by: STUDENT IN AN ORGANIZED HEALTH CARE EDUCATION/TRAINING PROGRAM

## 2023-08-02 PROCEDURE — 93005 ELECTROCARDIOGRAM TRACING: CPT

## 2023-08-02 PROCEDURE — 86900 BLOOD TYPING SEROLOGIC ABO: CPT | Performed by: STUDENT IN AN ORGANIZED HEALTH CARE EDUCATION/TRAINING PROGRAM

## 2023-08-02 PROCEDURE — 87389 HIV-1 AG W/HIV-1&-2 AB AG IA: CPT | Performed by: PHYSICIAN ASSISTANT

## 2023-08-02 PROCEDURE — 99291 PR CRITICAL CARE, E/M 30-74 MINUTES: ICD-10-PCS | Mod: ,,, | Performed by: STUDENT IN AN ORGANIZED HEALTH CARE EDUCATION/TRAINING PROGRAM

## 2023-08-02 PROCEDURE — 96374 THER/PROPH/DIAG INJ IV PUSH: CPT

## 2023-08-02 PROCEDURE — 83690 ASSAY OF LIPASE: CPT | Performed by: PHYSICIAN ASSISTANT

## 2023-08-02 PROCEDURE — 99285 EMERGENCY DEPT VISIT HI MDM: CPT | Mod: 25

## 2023-08-02 PROCEDURE — 93010 ELECTROCARDIOGRAM REPORT: CPT | Mod: ,,, | Performed by: INTERNAL MEDICINE

## 2023-08-02 PROCEDURE — 85025 COMPLETE CBC W/AUTO DIFF WBC: CPT | Mod: 91 | Performed by: PHYSICIAN ASSISTANT

## 2023-08-02 PROCEDURE — 87040 BLOOD CULTURE FOR BACTERIA: CPT | Performed by: STUDENT IN AN ORGANIZED HEALTH CARE EDUCATION/TRAINING PROGRAM

## 2023-08-02 PROCEDURE — 80053 COMPREHEN METABOLIC PANEL: CPT | Performed by: PHYSICIAN ASSISTANT

## 2023-08-02 PROCEDURE — 84484 ASSAY OF TROPONIN QUANT: CPT | Performed by: STUDENT IN AN ORGANIZED HEALTH CARE EDUCATION/TRAINING PROGRAM

## 2023-08-02 PROCEDURE — 83735 ASSAY OF MAGNESIUM: CPT | Performed by: STUDENT IN AN ORGANIZED HEALTH CARE EDUCATION/TRAINING PROGRAM

## 2023-08-02 PROCEDURE — 20600001 HC STEP DOWN PRIVATE ROOM

## 2023-08-02 PROCEDURE — 85379 FIBRIN DEGRADATION QUANT: CPT | Performed by: PHYSICIAN ASSISTANT

## 2023-08-02 PROCEDURE — 63600175 PHARM REV CODE 636 W HCPCS: Performed by: PHYSICIAN ASSISTANT

## 2023-08-02 PROCEDURE — 84100 ASSAY OF PHOSPHORUS: CPT | Performed by: STUDENT IN AN ORGANIZED HEALTH CARE EDUCATION/TRAINING PROGRAM

## 2023-08-02 PROCEDURE — 83605 ASSAY OF LACTIC ACID: CPT | Performed by: STUDENT IN AN ORGANIZED HEALTH CARE EDUCATION/TRAINING PROGRAM

## 2023-08-02 PROCEDURE — 99222 PR INITIAL HOSPITAL CARE,LEVL II: ICD-10-PCS | Mod: ,,, | Performed by: INTERNAL MEDICINE

## 2023-08-02 PROCEDURE — 86803 HEPATITIS C AB TEST: CPT | Performed by: PHYSICIAN ASSISTANT

## 2023-08-02 PROCEDURE — 96375 TX/PRO/DX INJ NEW DRUG ADDON: CPT

## 2023-08-02 PROCEDURE — 85025 COMPLETE CBC W/AUTO DIFF WBC: CPT | Mod: 91 | Performed by: STUDENT IN AN ORGANIZED HEALTH CARE EDUCATION/TRAINING PROGRAM

## 2023-08-02 PROCEDURE — 36415 COLL VENOUS BLD VENIPUNCTURE: CPT | Performed by: STUDENT IN AN ORGANIZED HEALTH CARE EDUCATION/TRAINING PROGRAM

## 2023-08-02 RX ORDER — ATORVASTATIN CALCIUM 40 MG/1
40 TABLET, FILM COATED ORAL NIGHTLY
Status: DISCONTINUED | OUTPATIENT
Start: 2023-08-02 | End: 2023-08-07 | Stop reason: HOSPADM

## 2023-08-02 RX ORDER — TALC
6 POWDER (GRAM) TOPICAL NIGHTLY PRN
Status: CANCELLED | OUTPATIENT
Start: 2023-08-02

## 2023-08-02 RX ORDER — MORPHINE SULFATE 4 MG/ML
4 INJECTION, SOLUTION INTRAMUSCULAR; INTRAVENOUS
Status: COMPLETED | OUTPATIENT
Start: 2023-08-02 | End: 2023-08-02

## 2023-08-02 RX ORDER — SODIUM CHLORIDE 0.9 % (FLUSH) 0.9 %
10 SYRINGE (ML) INJECTION
Status: CANCELLED | OUTPATIENT
Start: 2023-08-02

## 2023-08-02 RX ORDER — HEPARIN SODIUM,PORCINE/D5W 25000/250
0-40 INTRAVENOUS SOLUTION INTRAVENOUS CONTINUOUS
Status: DISCONTINUED | OUTPATIENT
Start: 2023-08-02 | End: 2023-08-03

## 2023-08-02 RX ORDER — SODIUM CHLORIDE 0.9 % (FLUSH) 0.9 %
10 SYRINGE (ML) INJECTION
Status: DISCONTINUED | OUTPATIENT
Start: 2023-08-02 | End: 2023-08-07 | Stop reason: HOSPADM

## 2023-08-02 RX ORDER — INSULIN ASPART 100 [IU]/ML
0-5 INJECTION, SOLUTION INTRAVENOUS; SUBCUTANEOUS EVERY 6 HOURS PRN
Status: DISCONTINUED | OUTPATIENT
Start: 2023-08-02 | End: 2023-08-07 | Stop reason: HOSPADM

## 2023-08-02 RX ORDER — NITROGLYCERIN 0.4 MG/1
0.4 TABLET SUBLINGUAL EVERY 5 MIN PRN
Status: DISCONTINUED | OUTPATIENT
Start: 2023-08-02 | End: 2023-08-07 | Stop reason: HOSPADM

## 2023-08-02 RX ORDER — GABAPENTIN 300 MG/1
300 CAPSULE ORAL 2 TIMES DAILY
Status: DISCONTINUED | OUTPATIENT
Start: 2023-08-03 | End: 2023-08-07 | Stop reason: HOSPADM

## 2023-08-02 RX ORDER — ASPIRIN 325 MG
325 TABLET ORAL
Status: COMPLETED | OUTPATIENT
Start: 2023-08-02 | End: 2023-08-02

## 2023-08-02 RX ORDER — DROPERIDOL 2.5 MG/ML
1.25 INJECTION, SOLUTION INTRAMUSCULAR; INTRAVENOUS ONCE
Status: COMPLETED | OUTPATIENT
Start: 2023-08-02 | End: 2023-08-02

## 2023-08-02 RX ORDER — GLUCAGON 1 MG
1 KIT INJECTION
Status: DISCONTINUED | OUTPATIENT
Start: 2023-08-02 | End: 2023-08-05

## 2023-08-02 RX ORDER — NAPROXEN SODIUM 220 MG/1
81 TABLET, FILM COATED ORAL DAILY
Status: DISCONTINUED | OUTPATIENT
Start: 2023-08-03 | End: 2023-08-07 | Stop reason: HOSPADM

## 2023-08-02 RX ORDER — ISOSORBIDE MONONITRATE 10 MG/1
10 TABLET ORAL DAILY
Status: DISCONTINUED | OUTPATIENT
Start: 2023-08-03 | End: 2023-08-03

## 2023-08-02 RX ORDER — INSULIN ASPART 100 [IU]/ML
1-10 INJECTION, SOLUTION INTRAVENOUS; SUBCUTANEOUS EVERY 6 HOURS PRN
Status: DISCONTINUED | OUTPATIENT
Start: 2023-08-02 | End: 2023-08-02

## 2023-08-02 RX ORDER — ONDANSETRON 2 MG/ML
4 INJECTION INTRAMUSCULAR; INTRAVENOUS
Status: COMPLETED | OUTPATIENT
Start: 2023-08-02 | End: 2023-08-02

## 2023-08-02 RX ORDER — ESCITALOPRAM OXALATE 10 MG/1
10 TABLET ORAL DAILY
Status: DISCONTINUED | OUTPATIENT
Start: 2023-08-03 | End: 2023-08-07 | Stop reason: HOSPADM

## 2023-08-02 RX ORDER — FAMOTIDINE 10 MG/ML
40 INJECTION INTRAVENOUS
Status: COMPLETED | OUTPATIENT
Start: 2023-08-02 | End: 2023-08-02

## 2023-08-02 RX ADMIN — FAMOTIDINE 40 MG: 10 INJECTION, SOLUTION INTRAVENOUS at 05:08

## 2023-08-02 RX ADMIN — ASPIRIN 325 MG ORAL TABLET 325 MG: 325 PILL ORAL at 05:08

## 2023-08-02 RX ADMIN — HEPARIN SODIUM AND DEXTROSE 12 UNITS/KG/HR: 10000; 5 INJECTION INTRAVENOUS at 10:08

## 2023-08-02 RX ADMIN — ONDANSETRON 4 MG: 2 INJECTION INTRAMUSCULAR; INTRAVENOUS at 05:08

## 2023-08-02 RX ADMIN — PIPERACILLIN SODIUM AND TAZOBACTAM SODIUM 4.5 G: 4; .5 INJECTION, POWDER, FOR SOLUTION INTRAVENOUS at 11:08

## 2023-08-02 RX ADMIN — SODIUM CHLORIDE, POTASSIUM CHLORIDE, SODIUM LACTATE AND CALCIUM CHLORIDE 1000 ML: 600; 310; 30; 20 INJECTION, SOLUTION INTRAVENOUS at 05:08

## 2023-08-02 RX ADMIN — IOHEXOL 75 ML: 350 INJECTION, SOLUTION INTRAVENOUS at 06:08

## 2023-08-02 RX ADMIN — ATORVASTATIN CALCIUM 40 MG: 40 TABLET, FILM COATED ORAL at 11:08

## 2023-08-02 RX ADMIN — TICAGRELOR 180 MG: 90 TABLET ORAL at 10:08

## 2023-08-02 RX ADMIN — MORPHINE SULFATE 4 MG: 4 INJECTION INTRAVENOUS at 05:08

## 2023-08-02 RX ADMIN — DROPERIDOL 1.25 MG: 2.5 INJECTION, SOLUTION INTRAMUSCULAR; INTRAVENOUS at 07:08

## 2023-08-02 NOTE — Clinical Note
The catheter was inserted into the ostium   right coronary artery. An angiography was performed of the right coronary arteries. Multiple views were taken. And the catheter was removed

## 2023-08-02 NOTE — Clinical Note
The catheter was repositioned into the left ventricle. Pullback was recorded.   And the catheter was removed

## 2023-08-02 NOTE — Clinical Note
The catheter was repositioned into the ostium   left main. An angiography was performed of the left coronary arteries. Multiple views were taken. And the catheter was removed

## 2023-08-02 NOTE — Clinical Note
An angiography was performed of the LV. Multiple views were taken. The angiography was performed via power injection. The injected amount was 26 mL contrast at 15 mL/s. The PSI from the power injection was 1000. LV gram perfromed

## 2023-08-02 NOTE — ED PROVIDER NOTES
Encounter Date: 8/2/2023       History     Chief Complaint   Patient presents with    Abdominal Pain     Endorses nausea and emesis     63-year-old female with past medical history of CAD, CHF, DM T2, hypertension, hyperlipidemia who presents to the ED for epigastric abdominal pain and nausea vomiting.  Reports burning epigastric abdominal pain that is nonradiating and denies any aggravating or alleviating factors.  Currently rates the pain 5/10.  Reports severe nausea vomiting for 3 days and has been unable to keep down anything.  Has not taken anything prior to arrival.  States she has history of pancreatitis in the past but had her gallbladder removed and denies any EtOH use recently.  Denies any fevers/chills, urinary complaints, constipation or diarrhea.        Review of patient's allergies indicates:   Allergen Reactions    Milk containing products (dairy)      Causes GI distress     Past Medical History:   Diagnosis Date    Anticoagulant long-term use     Asthma     Back pain     Bradycardia, unspecified 05/08/2019    The etiology of the bradycardic episode is unclear.  The have appear to be respiratory in origin (at least the 1st episode).  We will continue to monitor carefully.  We are awaiting evaluation by Cardiology.      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, with long-term current use of insulin 01/09/2020      Past Surgical History:   Procedure Laterality Date    ANTEGRADE NEPHROSTOGRAPHY Left 2019    Procedure: Nephrostogram - antegrade;  Surgeon: Robin Boyd MD;  [facility];  Service: Urology;  Laterality: Left;    BRONCHOSCOPY N/A 2019    Procedure: BRONCHOSCOPY;  Surgeon: Sean Ruano MD;  [facility];  Service: General;  Laterality: N/A;    CARDIAC CATHETERIZATION      CATARACT EXTRACTION      cataract extraction left eye      cataracts      CAUDAL EPIDURAL STEROID INJECTION N/A 2019    Procedure: Injection-steroid-epidural-caudal;  Surgeon: Dave Bentley Jr., MD;  Location: [facility] PAIN MGT;  Service: Pain Management;  Laterality: N/A;     SECTION, LOW TRANSVERSE      COLONOSCOPY N/A 2019    Procedure: COLONOSCOPY;  Surgeon: Al Alaniz MD;  Location: [facility] ENDO (2ND FLR);  Service: Endoscopy;  Laterality: N/A;    CORONARY ANGIOPLASTY      CYSTOGRAM N/A 2019    Procedure: CYSTOGRAM INTRAOP;  Surgeon: Robin Boyd MD;  [facility];  Service: Urology;  Laterality: N/A;  1 HOUR    CYSTOSCOPY W/ RETROGRADES Left 2019    Procedure: CYSTOSCOPY, WITH RETROGRADE PYELOGRAM;  Surgeon: Robin Boyd MD;  [facility];  Service: Urology;  Laterality: Left;  fluro    ENDOSCOPIC ULTRASOUND OF UPPER GASTROINTESTINAL TRACT N/A 6/15/2023    Procedure: ULTRASOUND, UPPER GI TRACT, ENDOSCOPIC;  Surgeon: Lisa Kim MD;  [facility]  Service: Endoscopy;  Laterality: N/A;    ERCP N/A 6/15/2023    Procedure: ERCP (ENDOSCOPIC RETROGRADE CHOLANGIOPANCREATOGRAPHY);  Surgeon: Lisa Kim MD;  Location: [facility] ENDO (2ND FLR);  Service: Endoscopy;  Laterality: N/A;    ESOPHAGOGASTRODUODENOSCOPY W/ PEG  2019    Procedure: EGD, WITH PEG TUBE INSERTION;  Surgeon: Sean Ruano MD;  [facility];  Service: General;;    EXCISION TURBINATE, SUBMUCOUS      FLEXIBLE SIGMOIDOSCOPY N/A 2019     Procedure: SIGMOIDOSCOPY, FLEXIBLE;  Surgeon: ALBERTO Amin MD;  Location: [facility] ENDO (2ND FLR);  Service: Endoscopy;  Laterality: N/A;    FLEXIBLE SIGMOIDOSCOPY N/A 5/21/2019    Procedure: SIGMOIDOSCOPY, FLEXIBLE;  Surgeon: ALBERTO Amin MD;  Location: [facility] ENDO (2ND FLR);  Service: Endoscopy;  Laterality: N/A;    FUSION OF LUMBAR SPINE BY ANTERIOR APPROACH Left 4/12/2019    Procedure: FUSION, SPINE, LUMBAR, ANTERIOR APPROACH Left L5-S1 Anterior to Psoa Interbody Fusion, L5-S1 Posterior Instrumentation;  Surgeon: Mk George MD;  Location: [address];  Service: Neurosurgery;  Laterality: Left;  Porcedure:  Left L5-S1 Anterior to Psoa Interbody Fusion, L5-S1 Posterior Instrumentation  Surgery Time: 4 Hrs  LOS: 2-3  Anesthesia: General   Blood: Type & Screen  R    HAND SURGERY Left     HAND SURGERY Right     torn ligament repair/ Dr. Yeboah    HYSTERECTOMY      INJECTION OF STEROID Right 12/10/2020    Procedure: INJECTION, STEROID Right SI Joint Block and Steroid Injection;  Surgeon: Mk George MD;  Location: [facility];  Service: Neurosurgery;  Laterality: Right;  Procedure: Right SI Joint Block and Steroid Injection   SUrgery Time: 30 Min  LOS: 0  Anesthesia: MAC  Radiology: C-arm  Bed: [place] [id number] Poster  Position: Prone    INJECTION OF STEROID Right 9/28/2021    Procedure: INJECTION, STEROID Right SI joint block & steroid injection;  Surgeon: Mk George MD;  Location: [facility];  Service: Neurosurgery;  Laterality: Right;  Procedure: Right SI joint block & steroid injection  Surgery Time: 30m  Anesthesia: Local MAC  Radiology: C-arm  Bed: Regular  Position: Prone    LAPAROSCOPIC CHOLECYSTECTOMY N/A 6/23/2023    Procedure: CHOLECYSTECTOMY, LAPAROSCOPIC;  Surgeon: Richard Penny MD;  Location: [address];  Service: General;  Laterality: N/A;    left foot surgery      left wrist surgery      LYSIS OF ADHESIONS N/A 2/19/2020    Procedure: LYSIS, ADHESIONS;  Surgeon: Robin Boyd MD;   [address];  Service: Urology;  Laterality: N/A;    NASAL SEPTUM SURGERY  5/7/15    OPEN REDUCTION AND INTERNAL FIXATION (ORIF) OF FRACTURE OF PROXIMAL HUMERUS Left 7/12/2023    Procedure: ORIF, FRACTURE, HUMERUS, PROXIMAL ;  Surgeon: Tomasz Leary MD;  Location: [address];  Service: Orthopedics;  Laterality: Left;    PERCUTANEOUS NEPHROSTOMY Left 4/21/2019    Procedure: Creation, Nephrostomy, Percutaneous;  Surgeon: Karina Surgeon;  Location: [facility];  Service: Anesthesiology;  Laterality: Left;    REPAIR OF URETER  4/12/2019    Procedure: REPAIR, URETER;  Surgeon: Mk George MD;  Location: [address];  Service: Neurosurgery;;    REPLACEMENT OF NEPHROSTOMY TUBE N/A 7/18/2019    Procedure: REPLACEMENT, NEPHROSTOMY TUBE;  Surgeon: [facility] Diagnostic Provider;  Location: [address];  Service: Anesthesiology;  Laterality: N/A;  188    REPLACEMENT OF NEPHROSTOMY TUBE N/A 7/24/2019    Procedure: REPLACEMENT, NEPHROSTOMY TUBE;  Surgeon: [facility] Diagnostic Provider;  Location: [address];  Service: Anesthesiology;  Laterality: N/A;  188    REPLACEMENT OF NEPHROSTOMY TUBE N/A 10/7/2019    Procedure: REPLACEMENT, NEPHROSTOMY TUBE;  Surgeon: [facility] Diagnostic Provider;  Location: [address];  Service: Anesthesiology;  Laterality: N/A;  189    REPLACEMENT OF NEPHROSTOMY TUBE N/A 11/25/2019    Procedure: REPLACEMENT, NEPHROSTOMY TUBE;  Surgeon: [facility] Diagnostic Provider;  Location: [address];  Service: Anesthesiology;  Laterality: N/A;  Room 188/Bessy    REPLACEMENT OF NEPHROSTOMY TUBE Right 2/19/2020    Procedure: REPLACEMENT, NEPHROSTOMY TUBE removal removal;  Surgeon: Robin Boyd MD;  Location: [address];  Service: Urology;  Laterality: Right;    rt elbow surgery      S/P LAD COATED STENT  05/14/2010    6 total     S/P OM1 STENT  08/2003    SINUS SURGERY      F.E.S.S.    TRACHEOSTOMY N/A 5/2/2019    Procedure: CREATION, TRACHEOSTOMY;  Surgeon: Sean Ruano,  MD;  Location: [facility] FLR;  Service: General;  Laterality: N/A;    TUBAL LIGATION      URETERAL REIMPLANTION Left 2/19/2020    Procedure: REIMPLANTATION, URETER WITH PSOAS HITCH;  Surgeon: Robin Boyd MD;  [facility];  Service: Urology;  Laterality: Left;     Family History   Problem Relation Age of Onset    Diabetes Mother     Heart disease Mother     Diabetes Father     Leukemia Father         leukemia    Heart attack Father     Diabetes Sister     Diabetes Brother     Diabetes Sister     No Known Problems Sister     No Known Problems Brother     No Known Problems Brother     No Known Problems Maternal Grandmother     No Known Problems Maternal Grandfather     No Known Problems Paternal Grandmother     No Known Problems Paternal Grandfather     No Known Problems Son     No Known Problems Son     No Known Problems Maternal Aunt     No Known Problems Maternal Uncle     No Known Problems Paternal Aunt     No Known Problems Paternal Uncle     Colon cancer Neg Hx     Inflammatory bowel disease Neg Hx     Melanoma Neg Hx     Psoriasis Neg Hx     Lupus Neg Hx     Eczema Neg Hx     Acne Neg Hx     Amblyopia Neg Hx     Blindness Neg Hx     Cancer Neg Hx     Cataracts Neg Hx     Glaucoma Neg Hx     Hypertension Neg Hx     Macular degeneration Neg Hx     Retinal detachment Neg Hx     Strabismus Neg Hx     Stroke Neg Hx     Thyroid disease Neg Hx     Heart failure Neg Hx     Hyperlipidemia Neg Hx      Social History     Tobacco Use    Smoking status: Never    Smokeless tobacco: Never   Substance Use Topics    Alcohol use: No     Alcohol/week: 0.0 standard drinks of alcohol    Drug use: No     Review of Systems   Constitutional:  Negative for chills and fever.   HENT:  Negative for sore throat.    Respiratory:  Negative for cough and shortness of breath.    Cardiovascular:  Negative for chest pain.   Gastrointestinal:  Positive for abdominal pain, nausea and vomiting.   Genitourinary:  Negative for  difficulty urinating and dysuria.   Musculoskeletal:  Negative for back pain.   Skin: Negative.    Neurological:  Negative for weakness.   Psychiatric/Behavioral:  Negative for confusion.        Physical Exam     Initial Vitals [08/02/23 1436]   BP Pulse Resp Temp SpO2   (!) 140/82 80 20 97.1 °F (36.2 °C) 100 %      MAP       --         Physical Exam    Nursing note and vitals reviewed.  Constitutional: She appears well-developed and well-nourished.   HENT:   Head: Normocephalic and atraumatic.   Eyes: Conjunctivae are normal. Pupils are equal, round, and reactive to light.   Neck: Neck supple.   Normal range of motion.  Cardiovascular:  Normal rate.           Pulmonary/Chest: Breath sounds normal.   Abdominal: Abdomen is soft. She exhibits no distension and no mass. There is abdominal tenderness (Epigastric, negative Robles's). There is no rebound and no guarding.   Musculoskeletal:         General: Normal range of motion.      Cervical back: Normal range of motion and neck supple.     Neurological: She is alert and oriented to person, place, and time.   Skin: Skin is warm and dry. Capillary refill takes less than 2 seconds.   Psychiatric: She has a normal mood and affect.         ED Course   Critical Care    Date/Time: 8/16/2023 8:12 AM    Performed by: Isela Angulo PA-C  Authorized by: Miguel Vora MD  Direct patient critical care time: 15 minutes  Additional history critical care time: 5 minutes  Ordering / reviewing critical care time: 5 minutes  Documentation critical care time: 5 minutes  Total critical care time (exclusive of procedural time) : 30 minutes  Critical care was necessary to treat or prevent imminent or life-threatening deterioration of the following conditions: cardiac failure.  Critical care was time spent personally by me on the following activities: discussions with primary provider, interpretation of cardiac output measurements, evaluation of patient's response to treatment,  examination of patient, obtaining history from patient or surrogate, ordering and performing treatments and interventions, ordering and review of laboratory studies, ordering and review of radiographic studies, pulse oximetry, re-evaluation of patient's condition and review of old charts.        Labs Reviewed   CBC W/ AUTO DIFFERENTIAL - Abnormal; Notable for the following components:       Result Value    RBC 3.16 (*)     Hemoglobin 7.7 (*)     Hematocrit 26.7 (*)     MCH 24.4 (*)     MCHC 28.8 (*)     RDW 15.7 (*)     Platelets 512 (*)     Gran % 81.1 (*)     Lymph % 12.9 (*)     All other components within normal limits   COMPREHENSIVE METABOLIC PANEL - Abnormal; Notable for the following components:    Glucose 290 (*)     Albumin 2.2 (*)     Alkaline Phosphatase 982 (*)     eGFR 56.8 (*)     Anion Gap 17 (*)     All other components within normal limits   LIPASE - Abnormal; Notable for the following components:    Lipase 69 (*)     All other components within normal limits   TROPONIN I - Abnormal; Notable for the following components:    Troponin I 0.313 (*)     All other components within normal limits   TROPONIN I - Abnormal; Notable for the following components:    Troponin I 2.230 (*)     All other components within normal limits   HIV 1 / 2 ANTIBODY    Narrative:     Release to patient->Immediate   HEPATITIS C ANTIBODY    Narrative:     Release to patient->Immediate   BETA - HYDROXYBUTYRATE, SERUM   URINALYSIS, REFLEX TO URINE CULTURE   APTT   PROTIME-INR   CBC W/ AUTO DIFFERENTIAL   ISTAT CHEM8          Imaging Results              X-Ray Chest AP Portable (Final result)  Result time 08/02/23 19:01:17      Final result by Chriss Holman MD (08/02/23 19:01:17)                   Impression:      1. Interstitial findings may reflect mild congestive change or edema, no large focal consolidation.      Electronically signed by: Chriss Holman MD  Date:    08/02/2023  Time:    19:01               Narrative:     EXAMINATION:  XR CHEST AP PORTABLE    CLINICAL HISTORY:  epigastric pain;    TECHNIQUE:  Single frontal view of the chest was performed.    COMPARISON:  06/16/2023    FINDINGS:  The cardiomediastinal silhouette is not enlarged noting calcification of the aorta..  There is no pleural effusion.  The trachea is midline.  The lungs are symmetrically expanded bilaterally with mildly coarse central hilar interstitial attenuation.  No large focal consolidation seen.  There is no pneumothorax.  The osseous structures are remarkable for degenerative change..                                        CT Abdomen Pelvis With Contrast (Final result)  Result time 08/02/23 19:40:28      Final result by Chriss Holman MD (08/02/23 19:40:28)                   Impression:      1. Innumerable low attenuating hepatic lesions, new since the previous exam.  Malignancy remains a concern however given short-term development, correlation is needed to exclude multifocal infection/abscess in this patient status post bile duct stent placement and cholecystectomy.  Please note, there is a low attenuating focus within the gallbladder fossa, similar to the foci throughout the hepatic parenchyma..  2. Pancreatic ductal dilatation up to 4 mm, minimally increased compared to prior.  There is fullness of the pancreatic head, underlying mass is not excluded, correlation with recent ERCP advised.  3. Simple and complex bilateral renal cysts.  4. Biliary stent in place with expected pneumobilia.  5. Cholecystectomy with scattered fluid about the gallbladder fossa.  6. Moderate stool throughout the colon, may reflect developing constipation.  7.  Additional findings as above.  This report was flagged in Epic as abnormal.    Electronically signed by resident: Akhil Wall  Date:    08/02/2023  Time:    18:47    Electronically signed by: Chriss Holman MD  Date:    08/02/2023  Time:    19:40               Narrative:    EXAMINATION:  CT ABDOMEN PELVIS  WITH CONTRAST    CLINICAL HISTORY:  Epigastric pain;    TECHNIQUE:  Low dose axial images were obtained from the lung bases to the pubic symphysis following the intravenous administration of 75 cc of Omnipaque 350.  Sagittal and coronal reformats were provided.    COMPARISON:  Abdominal ultrasound 06/30/2023.  Renal ultrasound 06/19/2023.  MRI abdomen 06/11/2023.  CT abdomen pelvis 05/25/2023.    FINDINGS:  Heart: Heart is not enlarged.  No pericardial effusion. Calcifications of the aortic valve annulus.  Coronary artery calcific atherosclerosis.    Lung bases: There is bilateral basilar dependent atelectasis.  No pleural effusion or pneumothorax.    Liver: Upper normal in size.  Innumerable hypoattenuating lesions throughout the hepatic parenchyma, new from prior.  Additionally, there is a low attenuating collection within the gallbladder fossa, could reflect versus associated with the diffuse hepatic process as described.  Portal vein is patent.  Periportal edema and scattered fluid about the gallbladder fossa.    Gallbladder: Surgically absent.    Bile Ducts: Biliary stent in place.  Central pneumobilia.  Mild central intrahepatic biliary duct dilatation.  The common duct measures up to 1 cm without intraluminal filling defect.    Pancreas: Mild pancreatic ductal dilatation, measuring 4 mm at the head/neck (coronal image 89).  There is prominence of the pancreatic head, measuring approximately 3.3 x 3.5 cm..    Spleen: Unremarkable.    Adrenals: Unremarkable.    Kidneys/ Ureters: Kidneys enhance symmetrically.  There is bilateral nonobstructive nephrolithiasis.  The kidneys excrete contrast appropriately.  There are numerous low attenuating lesions throughout the kidneys bilaterally, the larger of which measure attenuation suggesting cysts, the smaller are too small for characterization.  The bilateral ureters are unable to be followed in their entirety to the urinary bladder, no definite calculi seen or  findings to suggest obstructive uropathy.    Bladder: Smooth contours without bladder wall thickening.    Reproductive organs: Hysterectomy.  The adnexa is unremarkable.    GI Tract/Mesentery: The stomach is unremarkable.  There is moderate to large amount of stool within the colon.  Appendix appears unremarkable.  The small bowel is unremarkable.    Peritoneal Space: No intraperitoneal free fluid or free air.    Lymph nodes: No significant adenopathy.    Abdominal wall/extraperitoneal soft tissues: Postoperative change of the ventral abdominal wall.    Vasculature: There is atherosclerotic calcification of the aorta and its branches noting significant atherosclerotic calcification at the origin of the celiac artery.    Bones: Postoperative change of L5-S1 posterior instrumented fusion.  Postoperative change of the left wrist.  There are degenerative changes of the spine..                                       Medications   ticagrelor tablet 180 mg (has no administration in time range)   heparin 25,000 units in dextrose 5% (100 units/ml) IV bolus from bag INITIAL BOLUS (max bolus 4000 units) (has no administration in time range)   heparin 25,000 units in dextrose 5% 250 mL (100 units/mL) infusion LOW INTENSITY nomogram - [facility] (has no administration in time range)   heparin 25,000 units in dextrose 5% (100 units/ml) IV bolus from bag - ADDITIONAL PRN BOLUS - 60 units/kg (max bolus 4000 units) (has no administration in time range)   heparin 25,000 units in dextrose 5% (100 units/ml) IV bolus from bag - ADDITIONAL PRN BOLUS - 30 units/kg (max bolus 4000 units) (has no administration in time range)   ondansetron injection 4 mg (4 mg Intravenous Given 8/2/23 1748)   lactated ringers bolus 1,000 mL (1,000 mLs Intravenous New Bag 8/2/23 1755)   morphine injection 4 mg (4 mg Intravenous Given 8/2/23 1747)   famotidine (PF) injection 40 mg (40 mg Intravenous Given 8/2/23 1749)   aspirin tablet 325 mg (325 mg Oral Given 8/2/23  1756)   droPERidol injection 1.25 mg (1.25 mg Intravenous Given 8/2/23 1929)   iohexoL (OMNIPAQUE 350) injection 75 mL (75 mLs Intravenous Given 8/2/23 1845)     Medical Decision Making:   History:   Old Medical Records: I decided to obtain old medical records.  Initial Assessment:   63-year-old female presents ED for 3 days of nausea vomiting and epigastric abdominal pain  Differential Diagnosis:   ACS, pancreatitis, malignancy, gastritis, electrolyte derangement, pneumonia  Independently Interpreted Test(s):   I have ordered and independently interpreted EKG Reading(s) - see summary below       <> Summary of EKG Reading(s): Normal sinus rhythm at a rate of 66 with depressions in V2 V3 as well as lead 1 and aVL which is new.  Does not meet STEMI criteria.  Clinical Tests:   Lab Tests: Ordered and Reviewed  Radiological Study: Ordered and Reviewed  Medical Tests: Ordered and Reviewed  ED Management:  Patient mildly tender on the epigastric region on exam.  Pain improved with morphine and she is now pain-free.  She did require Zofran and droperidol for her nausea.  Her initial troponin elevated at 0.3 which is down trending from her previous troponin however that was 4 years ago.  Second troponin significantly elevated at 2.2.  We started ACS protocol and was loaded with aspirin, Brilinta and heparin infusion.  Chest x-ray with no acute cardiopulmonary abnormalities.  Her CT abdomen pelvis does show fullness of the pancreatic head as well as innumerable hypotension lesions to the liver which is concerning for malignancy.  Will admit to hospital medicine for further management of NSTEMI and she will likely need further malignancy workup for potential pancreatic cancer as well.             ED Course as of 08/02/23 2036   Wed Aug 02, 2023   1705 WBC: 7.58  No leukocytosis [HJ]   1845 Lipase(!): 69  Minimally elevated not consistent with acute pancreatitis [HJ]      ED Course User Index  [HJ] Isela Angulo PA-C                  Clinical Impression:   Final diagnoses:  [R10.13] Epigastric pain  [I21.4] NSTEMI (non-ST elevated myocardial infarction)  [K86.89] Pancreatic mass (Primary)        ED Disposition Condition    Admit Stable                Isela Angulo PA-C  08/02/23 2036       Isela Angulo PA-C  08/16/23 0883

## 2023-08-02 NOTE — Clinical Note
The PA catheter was repositioned to the main pulmonary artery. Hemodynamics were performed. O2 saturation was measured at 47%. CO=3.97  CI=2.44

## 2023-08-02 NOTE — Clinical Note
Diagnosis: Pancreatic mass [626208]   Admitting Provider:: ELI ROLAND [[id number]]   Future Attending Provider: ELI ROLAND [[id number]]   Reason for IP Medical Treatment  (Clinical interventions that can only be accomplished in the IP setting? ) :: NSTEMI, pancreatic mass   I certify that Inpatient services for greater than or equal to 2 midnights are medically necessary:: Yes   Plans for Post-Acute care--if anticipated (pick the single best option):: A. No post acute care anticipated at this time

## 2023-08-03 LAB
ALBUMIN SERPL BCP-MCNC: 2.1 G/DL (ref 3.5–5.2)
ALP SERPL-CCNC: 901 U/L (ref 55–135)
ALT SERPL W/O P-5'-P-CCNC: 26 U/L (ref 10–44)
ANION GAP SERPL CALC-SCNC: 16 MMOL/L (ref 8–16)
APTT PPP: 42.5 SEC (ref 21–32)
ASCENDING AORTA: 2.43 CM
AST SERPL-CCNC: 114 U/L (ref 10–40)
AV INDEX (PROSTH): 0.69
AV MEAN GRADIENT: 2 MMHG
AV PEAK GRADIENT: 3 MMHG
AV VALVE AREA BY VELOCITY RATIO: 1.82 CM²
AV VALVE AREA: 1.92 CM²
AV VELOCITY RATIO: 0.65
BACTERIA #/AREA URNS AUTO: ABNORMAL /HPF
BASOPHILS # BLD AUTO: 0.04 K/UL (ref 0–0.2)
BASOPHILS # BLD AUTO: 0.04 K/UL (ref 0–0.2)
BASOPHILS NFR BLD: 0.4 % (ref 0–1.9)
BASOPHILS NFR BLD: 0.4 % (ref 0–1.9)
BILIRUB SERPL-MCNC: 0.7 MG/DL (ref 0.1–1)
BILIRUB UR QL STRIP: NEGATIVE
BNP SERPL-MCNC: 256 PG/ML (ref 0–99)
BSA FOR ECHO PROCEDURE: 1.62 M2
BUN SERPL-MCNC: 18 MG/DL (ref 8–23)
CALCIUM SERPL-MCNC: 9.5 MG/DL (ref 8.7–10.5)
CATH EF QUANTITATIVE: 45 %
CHLORIDE SERPL-SCNC: 99 MMOL/L (ref 95–110)
CHOLEST SERPL-MCNC: 190 MG/DL (ref 120–199)
CHOLEST/HDLC SERPL: 7.3 {RATIO} (ref 2–5)
CLARITY UR REFRACT.AUTO: ABNORMAL
CO2 SERPL-SCNC: 25 MMOL/L (ref 23–29)
COLOR UR AUTO: YELLOW
CREAT SERPL-MCNC: 0.8 MG/DL (ref 0.5–1.4)
CV ECHO LV RWT: 0.25 CM
DIFFERENTIAL METHOD: ABNORMAL
DIFFERENTIAL METHOD: ABNORMAL
DOP CALC AO PEAK VEL: 0.84 M/S
DOP CALC AO VTI: 14.56 CM
DOP CALC LVOT AREA: 2.8 CM2
DOP CALC LVOT DIAMETER: 1.88 CM
DOP CALC LVOT PEAK VEL: 0.55 M/S
DOP CALC LVOT STROKE VOLUME: 27.97 CM3
DOP CALCLVOT PEAK VEL VTI: 10.08 CM
E WAVE DECELERATION TIME: 99.72 MSEC
E/A RATIO: 2.46
E/E' RATIO: 13.23 M/S
ECHO LV POSTERIOR WALL: 0.61 CM (ref 0.6–1.1)
EOSINOPHIL # BLD AUTO: 0.1 K/UL (ref 0–0.5)
EOSINOPHIL # BLD AUTO: 0.1 K/UL (ref 0–0.5)
EOSINOPHIL NFR BLD: 0.8 % (ref 0–8)
EOSINOPHIL NFR BLD: 0.8 % (ref 0–8)
ERYTHROCYTE [DISTWIDTH] IN BLOOD BY AUTOMATED COUNT: 16.2 % (ref 11.5–14.5)
ERYTHROCYTE [DISTWIDTH] IN BLOOD BY AUTOMATED COUNT: 16.2 % (ref 11.5–14.5)
EST. GFR  (NO RACE VARIABLE): >60 ML/MIN/1.73 M^2
FRACTIONAL SHORTENING: 17 % (ref 28–44)
GLUCOSE SERPL-MCNC: 135 MG/DL (ref 70–110)
GLUCOSE UR QL STRIP: ABNORMAL
HCT VFR BLD AUTO: 28.5 % (ref 37–48.5)
HCT VFR BLD AUTO: 28.5 % (ref 37–48.5)
HDLC SERPL-MCNC: 26 MG/DL (ref 40–75)
HDLC SERPL: 13.7 % (ref 20–50)
HGB BLD-MCNC: 8 G/DL (ref 12–16)
HGB BLD-MCNC: 8 G/DL (ref 12–16)
HGB UR QL STRIP: NEGATIVE
HYALINE CASTS UR QL AUTO: 0 /LPF
IMM GRANULOCYTES # BLD AUTO: 0.03 K/UL (ref 0–0.04)
IMM GRANULOCYTES # BLD AUTO: 0.03 K/UL (ref 0–0.04)
IMM GRANULOCYTES NFR BLD AUTO: 0.3 % (ref 0–0.5)
IMM GRANULOCYTES NFR BLD AUTO: 0.3 % (ref 0–0.5)
INTERVENTRICULAR SEPTUM: 0.66 CM (ref 0.6–1.1)
KETONES UR QL STRIP: NEGATIVE
LA MAJOR: 4.98 CM
LA MINOR: 4.53 CM
LA WIDTH: 4.34 CM
LACTATE SERPL-SCNC: 1.3 MMOL/L (ref 0.5–2.2)
LACTATE SERPL-SCNC: 2.9 MMOL/L (ref 0.5–2.2)
LDLC SERPL CALC-MCNC: 133.6 MG/DL (ref 63–159)
LEFT ATRIUM SIZE: 4.13 CM
LEFT ATRIUM VOLUME INDEX MOD: 25.8 ML/M2
LEFT ATRIUM VOLUME INDEX: 44.6 ML/M2
LEFT ATRIUM VOLUME MOD: 41.84 CM3
LEFT ATRIUM VOLUME: 72.28 CM3
LEFT INTERNAL DIMENSION IN SYSTOLE: 4.06 CM (ref 2.1–4)
LEFT VENTRICLE DIASTOLIC VOLUME INDEX: 70.17 ML/M2
LEFT VENTRICLE DIASTOLIC VOLUME: 113.67 ML
LEFT VENTRICLE MASS INDEX: 61 G/M2
LEFT VENTRICLE SYSTOLIC VOLUME INDEX: 44.7 ML/M2
LEFT VENTRICLE SYSTOLIC VOLUME: 72.42 ML
LEFT VENTRICULAR INTERNAL DIMENSION IN DIASTOLE: 4.92 CM (ref 3.5–6)
LEFT VENTRICULAR MASS: 98.84 G
LEUKOCYTE ESTERASE UR QL STRIP: ABNORMAL
LV LATERAL E/E' RATIO: 12.29 M/S
LV SEPTAL E/E' RATIO: 14.33 M/S
LYMPHOCYTES # BLD AUTO: 2.2 K/UL (ref 1–4.8)
LYMPHOCYTES # BLD AUTO: 2.2 K/UL (ref 1–4.8)
LYMPHOCYTES NFR BLD: 21 % (ref 18–48)
LYMPHOCYTES NFR BLD: 21 % (ref 18–48)
MAGNESIUM SERPL-MCNC: 2 MG/DL (ref 1.6–2.6)
MAGNESIUM SERPL-MCNC: 2.1 MG/DL (ref 1.6–2.6)
MCH RBC QN AUTO: 24.2 PG (ref 27–31)
MCH RBC QN AUTO: 24.2 PG (ref 27–31)
MCHC RBC AUTO-ENTMCNC: 28.1 G/DL (ref 32–36)
MCHC RBC AUTO-ENTMCNC: 28.1 G/DL (ref 32–36)
MCV RBC AUTO: 86 FL (ref 82–98)
MCV RBC AUTO: 86 FL (ref 82–98)
MICROSCOPIC COMMENT: ABNORMAL
MONOCYTES # BLD AUTO: 0.9 K/UL (ref 0.3–1)
MONOCYTES # BLD AUTO: 0.9 K/UL (ref 0.3–1)
MONOCYTES NFR BLD: 8.1 % (ref 4–15)
MONOCYTES NFR BLD: 8.1 % (ref 4–15)
MV PEAK A VEL: 0.35 M/S
MV PEAK E VEL: 0.86 M/S
MV STENOSIS PRESSURE HALF TIME: 28.92 MS
MV VALVE AREA P 1/2 METHOD: 7.61 CM2
NEUTROPHILS # BLD AUTO: 7.4 K/UL (ref 1.8–7.7)
NEUTROPHILS # BLD AUTO: 7.4 K/UL (ref 1.8–7.7)
NEUTROPHILS NFR BLD: 69.4 % (ref 38–73)
NEUTROPHILS NFR BLD: 69.4 % (ref 38–73)
NITRITE UR QL STRIP: NEGATIVE
NONHDLC SERPL-MCNC: 164 MG/DL
NRBC BLD-RTO: 0 /100 WBC
NRBC BLD-RTO: 0 /100 WBC
OHS LV EJECTION FRACTION SIMPSONS BIPLANE MOD: 40 %
PH UR STRIP: 6 [PH] (ref 5–8)
PHOSPHATE SERPL-MCNC: 2.8 MG/DL (ref 2.7–4.5)
PHOSPHATE SERPL-MCNC: 3 MG/DL (ref 2.7–4.5)
PISA MRMAX VEL: 0.04 M/S
PLATELET # BLD AUTO: 395 K/UL (ref 150–450)
PLATELET # BLD AUTO: 395 K/UL (ref 150–450)
PMV BLD AUTO: 12.3 FL (ref 9.2–12.9)
PMV BLD AUTO: 12.3 FL (ref 9.2–12.9)
POCT GLUCOSE: 187 MG/DL (ref 70–110)
POCT GLUCOSE: 212 MG/DL (ref 70–110)
POCT GLUCOSE: 229 MG/DL (ref 70–110)
POTASSIUM SERPL-SCNC: 3.6 MMOL/L (ref 3.5–5.1)
PROT SERPL-MCNC: 7.9 G/DL (ref 6–8.4)
PROT UR QL STRIP: ABNORMAL
RA MAJOR: 3.43 CM
RA PRESSURE ESTIMATED: 3 MMHG
RA WIDTH: 2.84 CM
RBC # BLD AUTO: 3.3 M/UL (ref 4–5.4)
RBC # BLD AUTO: 3.3 M/UL (ref 4–5.4)
RBC #/AREA URNS AUTO: 20 /HPF (ref 0–4)
RIGHT VENTRICULAR END-DIASTOLIC DIMENSION: 2.67 CM
SINUS: 2.53 CM
SODIUM SERPL-SCNC: 140 MMOL/L (ref 136–145)
SP GR UR STRIP: 1.01 (ref 1–1.03)
STJ: 2.17 CM
TDI LATERAL: 0.07 M/S
TDI SEPTAL: 0.06 M/S
TDI: 0.07 M/S
TRICUSPID ANNULAR PLANE SYSTOLIC EXCURSION: 1.2 CM
TRIGL SERPL-MCNC: 152 MG/DL (ref 30–150)
TROPONIN I SERPL DL<=0.01 NG/ML-MCNC: 14.52 NG/ML (ref 0–0.03)
TROPONIN I SERPL DL<=0.01 NG/ML-MCNC: 29.58 NG/ML (ref 0–0.03)
URN SPEC COLLECT METH UR: ABNORMAL
WBC # BLD AUTO: 10.61 K/UL (ref 3.9–12.7)
WBC # BLD AUTO: 10.61 K/UL (ref 3.9–12.7)
WBC #/AREA URNS AUTO: 5 /HPF (ref 0–5)
YEAST UR QL AUTO: ABNORMAL
Z-SCORE OF LEFT VENTRICULAR DIMENSION IN END DIASTOLE: 0.71
Z-SCORE OF LEFT VENTRICULAR DIMENSION IN END SYSTOLE: 2.82

## 2023-08-03 PROCEDURE — 25000003 PHARM REV CODE 250: Performed by: STUDENT IN AN ORGANIZED HEALTH CARE EDUCATION/TRAINING PROGRAM

## 2023-08-03 PROCEDURE — 93010 ELECTROCARDIOGRAM REPORT: CPT | Mod: ,,, | Performed by: INTERNAL MEDICINE

## 2023-08-03 PROCEDURE — C1769 GUIDE WIRE: HCPCS | Performed by: INTERNAL MEDICINE

## 2023-08-03 PROCEDURE — 99233 SBSQ HOSP IP/OBS HIGH 50: CPT | Mod: ,,, | Performed by: INTERNAL MEDICINE

## 2023-08-03 PROCEDURE — 20000000 HC ICU ROOM

## 2023-08-03 PROCEDURE — 93010 ELECTROCARDIOGRAM REPORT: CPT | Mod: 76,,, | Performed by: INTERNAL MEDICINE

## 2023-08-03 PROCEDURE — 93010 EKG 12-LEAD: ICD-10-PCS | Mod: ,,, | Performed by: INTERNAL MEDICINE

## 2023-08-03 PROCEDURE — 93460 R&L HRT ART/VENTRICLE ANGIO: CPT | Mod: 26,,, | Performed by: INTERNAL MEDICINE

## 2023-08-03 PROCEDURE — 63600175 PHARM REV CODE 636 W HCPCS: Performed by: INTERNAL MEDICINE

## 2023-08-03 PROCEDURE — 93005 ELECTROCARDIOGRAM TRACING: CPT

## 2023-08-03 PROCEDURE — 94761 N-INVAS EAR/PLS OXIMETRY MLT: CPT

## 2023-08-03 PROCEDURE — 81001 URINALYSIS AUTO W/SCOPE: CPT | Performed by: PHYSICIAN ASSISTANT

## 2023-08-03 PROCEDURE — 93460 R&L HRT ART/VENTRICLE ANGIO: CPT | Performed by: INTERNAL MEDICINE

## 2023-08-03 PROCEDURE — 63600175 PHARM REV CODE 636 W HCPCS: Performed by: STUDENT IN AN ORGANIZED HEALTH CARE EDUCATION/TRAINING PROGRAM

## 2023-08-03 PROCEDURE — C1894 INTRO/SHEATH, NON-LASER: HCPCS | Performed by: INTERNAL MEDICINE

## 2023-08-03 PROCEDURE — 36415 COLL VENOUS BLD VENIPUNCTURE: CPT | Performed by: STUDENT IN AN ORGANIZED HEALTH CARE EDUCATION/TRAINING PROGRAM

## 2023-08-03 PROCEDURE — 93460 PR CATH PLACE/CORON ANGIO, IMG SUPER/INTERP,R&L HRT CATH, L HRT VENTRIC: ICD-10-PCS | Mod: 26,,, | Performed by: INTERNAL MEDICINE

## 2023-08-03 PROCEDURE — 84484 ASSAY OF TROPONIN QUANT: CPT | Performed by: STUDENT IN AN ORGANIZED HEALTH CARE EDUCATION/TRAINING PROGRAM

## 2023-08-03 PROCEDURE — 25000003 PHARM REV CODE 250: Performed by: INTERNAL MEDICINE

## 2023-08-03 PROCEDURE — 99233 PR SUBSEQUENT HOSPITAL CARE,LEVL III: ICD-10-PCS | Mod: ,,, | Performed by: INTERNAL MEDICINE

## 2023-08-03 PROCEDURE — C1751 CATH, INF, PER/CENT/MIDLINE: HCPCS | Performed by: INTERNAL MEDICINE

## 2023-08-03 PROCEDURE — 83735 ASSAY OF MAGNESIUM: CPT | Performed by: STUDENT IN AN ORGANIZED HEALTH CARE EDUCATION/TRAINING PROGRAM

## 2023-08-03 PROCEDURE — 84100 ASSAY OF PHOSPHORUS: CPT | Performed by: STUDENT IN AN ORGANIZED HEALTH CARE EDUCATION/TRAINING PROGRAM

## 2023-08-03 PROCEDURE — 83605 ASSAY OF LACTIC ACID: CPT | Performed by: STUDENT IN AN ORGANIZED HEALTH CARE EDUCATION/TRAINING PROGRAM

## 2023-08-03 PROCEDURE — 85730 THROMBOPLASTIN TIME PARTIAL: CPT | Performed by: PHYSICIAN ASSISTANT

## 2023-08-03 PROCEDURE — 80053 COMPREHEN METABOLIC PANEL: CPT | Performed by: STUDENT IN AN ORGANIZED HEALTH CARE EDUCATION/TRAINING PROGRAM

## 2023-08-03 PROCEDURE — 85025 COMPLETE CBC W/AUTO DIFF WBC: CPT | Performed by: PHYSICIAN ASSISTANT

## 2023-08-03 RX ORDER — TIROFIBAN HYDROCHLORIDE 50 UG/ML
0.15 INJECTION INTRAVENOUS CONTINUOUS
Status: DISPENSED | OUTPATIENT
Start: 2023-08-03 | End: 2023-08-04

## 2023-08-03 RX ORDER — TIROFIBAN HYDROCHLORIDE 50 UG/ML
INJECTION INTRAVENOUS
Status: DISCONTINUED | OUTPATIENT
Start: 2023-08-03 | End: 2023-08-07 | Stop reason: HOSPADM

## 2023-08-03 RX ORDER — ONDANSETRON 4 MG/1
8 TABLET, ORALLY DISINTEGRATING ORAL EVERY 8 HOURS PRN
Status: DISCONTINUED | OUTPATIENT
Start: 2023-08-03 | End: 2023-08-07 | Stop reason: HOSPADM

## 2023-08-03 RX ORDER — PROCHLORPERAZINE EDISYLATE 5 MG/ML
2.5 INJECTION INTRAMUSCULAR; INTRAVENOUS EVERY 6 HOURS PRN
Status: DISCONTINUED | OUTPATIENT
Start: 2023-08-03 | End: 2023-08-07

## 2023-08-03 RX ORDER — SODIUM CHLORIDE 9 MG/ML
INJECTION, SOLUTION INTRAVENOUS CONTINUOUS
Status: ACTIVE | OUTPATIENT
Start: 2023-08-03 | End: 2023-08-03

## 2023-08-03 RX ORDER — METOPROLOL TARTRATE 50 MG/1
50 TABLET ORAL 2 TIMES DAILY
Status: DISCONTINUED | OUTPATIENT
Start: 2023-08-03 | End: 2023-08-03

## 2023-08-03 RX ORDER — SODIUM CHLORIDE 0.9 % (FLUSH) 0.9 %
3 SYRINGE (ML) INJECTION EVERY 6 HOURS PRN
Status: DISCONTINUED | OUTPATIENT
Start: 2023-08-03 | End: 2023-08-07 | Stop reason: HOSPADM

## 2023-08-03 RX ORDER — HEPARIN SOD,PORCINE/0.9 % NACL 1000/500ML
INTRAVENOUS SOLUTION INTRAVENOUS
Status: DISCONTINUED | OUTPATIENT
Start: 2023-08-03 | End: 2023-08-03 | Stop reason: HOSPADM

## 2023-08-03 RX ORDER — DIPHENHYDRAMINE HCL 50 MG
50 CAPSULE ORAL ONCE
Status: COMPLETED | OUTPATIENT
Start: 2023-08-03 | End: 2023-08-03

## 2023-08-03 RX ORDER — LIDOCAINE HYDROCHLORIDE 20 MG/ML
INJECTION, SOLUTION INFILTRATION; PERINEURAL
Status: DISCONTINUED | OUTPATIENT
Start: 2023-08-03 | End: 2023-08-03 | Stop reason: HOSPADM

## 2023-08-03 RX ORDER — ACETAMINOPHEN 325 MG/1
650 TABLET ORAL EVERY 4 HOURS PRN
Status: DISCONTINUED | OUTPATIENT
Start: 2023-08-03 | End: 2023-08-07 | Stop reason: HOSPADM

## 2023-08-03 RX ADMIN — GABAPENTIN 300 MG: 300 CAPSULE ORAL at 10:08

## 2023-08-03 RX ADMIN — ESCITALOPRAM OXALATE 10 MG: 10 TABLET ORAL at 10:08

## 2023-08-03 RX ADMIN — TICAGRELOR 90 MG: 90 TABLET ORAL at 08:08

## 2023-08-03 RX ADMIN — DIPHENHYDRAMINE HYDROCHLORIDE 50 MG: 50 CAPSULE ORAL at 08:08

## 2023-08-03 RX ADMIN — ASPIRIN 81 MG CHEWABLE TABLET 81 MG: 81 TABLET CHEWABLE at 07:08

## 2023-08-03 RX ADMIN — PROCHLORPERAZINE EDISYLATE 2.5 MG: 5 INJECTION INTRAMUSCULAR; INTRAVENOUS at 12:08

## 2023-08-03 RX ADMIN — TICAGRELOR 90 MG: 90 TABLET ORAL at 07:08

## 2023-08-03 RX ADMIN — PIPERACILLIN SODIUM AND TAZOBACTAM SODIUM 4.5 G: 4; .5 INJECTION, POWDER, FOR SOLUTION INTRAVENOUS at 11:08

## 2023-08-03 RX ADMIN — PROCHLORPERAZINE EDISYLATE 2.5 MG: 5 INJECTION INTRAMUSCULAR; INTRAVENOUS at 04:08

## 2023-08-03 RX ADMIN — PIPERACILLIN SODIUM AND TAZOBACTAM SODIUM 4.5 G: 4; .5 INJECTION, POWDER, FOR SOLUTION INTRAVENOUS at 06:08

## 2023-08-03 RX ADMIN — INSULIN ASPART 2 UNITS: 100 INJECTION, SOLUTION INTRAVENOUS; SUBCUTANEOUS at 11:08

## 2023-08-03 RX ADMIN — TIROFIBAN 0.15 MCG/KG/MIN: 5 INJECTION, SOLUTION INTRAVENOUS at 10:08

## 2023-08-03 RX ADMIN — PIPERACILLIN SODIUM AND TAZOBACTAM SODIUM 4.5 G: 4; .5 INJECTION, POWDER, FOR SOLUTION INTRAVENOUS at 04:08

## 2023-08-03 RX ADMIN — GABAPENTIN 300 MG: 300 CAPSULE ORAL at 08:08

## 2023-08-03 RX ADMIN — INSULIN ASPART 2 UNITS: 100 INJECTION, SOLUTION INTRAVENOUS; SUBCUTANEOUS at 06:08

## 2023-08-03 RX ADMIN — SODIUM CHLORIDE: 9 INJECTION, SOLUTION INTRAVENOUS at 10:08

## 2023-08-03 RX ADMIN — ATORVASTATIN CALCIUM 40 MG: 40 TABLET, FILM COATED ORAL at 08:08

## 2023-08-03 NOTE — H&P
[facility]  Cardiology  History and Physical     Patient Name: Mariann Huff  MRN: [id number]  Admission Date: 8/2/2023  Code Status: Full Code   Attending Provider: Aime George MD   Primary Care Physician: Jasbir Haney MD  Principal Problem:NSTEMI (non-ST elevated myocardial infarction)    Patient information was obtained from patient, spouse/SO, past medical records and ER records.     Subjective:     Chief Complaint:  Chest pain    HPI:  Mariann Huff is a 63 y.o. female with CAD s/p GINGER to ostial LAD in 2014, HTN, HLD, DM2, MURTAZA who presented to the ED with vomiting x 1 day. She also feels tired and her appetite lately has decreased. She reports losing weight as well. No cardiac symptoms at this time. No CP or SOB. Her EKG in the ED showed transient ST depressions in septal leads. Trop went up to 0.3 -> 2.2. Echo in the ED by me showed new EF drop to about 40% with regional WMA in anterolateral, apical and anterior segments. She also has ischemic MR likely from tethering of posterior leaflet and a possible LV thrombus however formal echo with contrast to be done tomorrow. CVP 3 by echo and she has normal BP and HR at this time. She denies any chest symptoms. Prior Cath in 2014 with LAD 99% lesion which was stented, lcx 95%, ramus 80%; no recent stress tests.    Accession #: [id number]  Transthoracic echo (TTE) 5/26/2023   The estimated ejection fraction is 55%.   The quantitatively derived ejection fraction is 52%.   Normal right ventricular size with normal right ventricular systolic function.   Normal left ventricular diastolic function.   The left ventricle is normal in size with normal systolic function.   Normal central venous pressure (3 mmHg).      Patient with LHC on 8/3 with multivessel disease with no significant stenosis and plan for medical management without stents placed.  Transferred to CCU service for continued medical management.        Past Medical History:    Diagnosis Date    Anticoagulant long-term use     Asthma     Back pain     Bradycardia, unspecified 2019    The etiology of the bradycardic episode is unclear.  The have appear to be respiratory in origin (at least the 1st episode).  We will continue to monitor carefully.  We are awaiting evaluation by Cardiology.     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, with long-term current use of insulin 2020       Past Surgical History:   Procedure Laterality Date    ANTEGRADE NEPHROSTOGRAPHY Left 2019    Procedure: Nephrostogram - antegrade;  Surgeon: Robin Boyd MD;  Location: [facility] OR [address] [place] [address];  Service: Urology;  Laterality: Left;    BRONCHOSCOPY N/A 2019    Procedure: BRONCHOSCOPY;  Surgeon: Sean Ruano MD;  Location: [facility] OR [address] [place] [address];  Service: General;  Laterality: N/A;    CARDIAC CATHETERIZATION      CATARACT EXTRACTION      cataract extraction left eye      cataracts      CAUDAL EPIDURAL STEROID INJECTION N/A 2019    Procedure: Injection-steroid-epidural-caudal;  Surgeon: Dave Bentley Jr., MD;  Location: [facility];  Service: Pain Management;  Laterality: N/A;     SECTION, LOW TRANSVERSE      COLONOSCOPY N/A 2019    Procedure: COLONOSCOPY;  Surgeon: Al Alaniz MD;  [facility]  Service: Endoscopy;  Laterality: N/A;     CORONARY ANGIOPLASTY      CYSTOGRAM N/A 12/11/2019    Procedure: CYSTOGRAM INTRAOP;  Surgeon: Robin Boyd MD;  Location: [facility] OR 2ND FLR;  Service: Urology;  Laterality: N/A;  1 HOUR    CYSTOSCOPY W/ RETROGRADES Left 12/11/2019    Procedure: CYSTOSCOPY, WITH RETROGRADE PYELOGRAM;  Surgeon: Robin Boyd MD;  [facility];  Service: Urology;  Laterality: Left;  fluro    ENDOSCOPIC ULTRASOUND OF UPPER GASTROINTESTINAL TRACT N/A 6/15/2023    Procedure: ULTRASOUND, UPPER GI TRACT, ENDOSCOPIC;  Surgeon: Lisa Kim MD;  Location: [facility] ENDO (2ND FLR);  Service: Endoscopy;  Laterality: N/A;    ERCP N/A 6/15/2023    Procedure: ERCP (ENDOSCOPIC RETROGRADE CHOLANGIOPANCREATOGRAPHY);  Surgeon: Lisa Kim MD;  Location: [facility] ENDO (2ND FLR);  Service: Endoscopy;  Laterality: N/A;    ESOPHAGOGASTRODUODENOSCOPY W/ PEG  5/2/2019    Procedure: EGD, WITH PEG TUBE INSERTION;  Surgeon: Sean Ruano MD;  [facility];  Service: General;;    EXCISION TURBINATE, SUBMUCOUS      FLEXIBLE SIGMOIDOSCOPY N/A 5/13/2019    Procedure: SIGMOIDOSCOPY, FLEXIBLE;  Surgeon: ALBERTO Amin MD;  Location: [facility] ENDO (2ND FLR);  Service: Endoscopy;  Laterality: N/A;    FLEXIBLE SIGMOIDOSCOPY N/A 5/21/2019    Procedure: SIGMOIDOSCOPY, FLEXIBLE;  Surgeon: ALBERTO Amin MD;  [facility]  Service: Endoscopy;  Laterality: N/A;    FUSION OF LUMBAR SPINE BY ANTERIOR APPROACH Left 4/12/2019    Procedure: FUSION, SPINE, LUMBAR, ANTERIOR APPROACH Left L5-S1 Anterior to Psoa Interbody Fusion, L5-S1 Posterior Instrumentation;  Surgeon: Mk George MD;  [facility];  Service: Neurosurgery;  Laterality: Left;  Porcedure:  Left L5-S1 Anterior to Psoa Interbody Fusion, L5-S1 Posterior Instrumentation  Surgery Time: 4 Hrs  LOS: 2-3  Anesthesia: General   Blood: Type & Screen  R    HAND SURGERY Left     HAND SURGERY Right     torn ligament repair/ Dr. Yeboah    HYSTERECTOMY       INJECTION OF STEROID Right 12/10/2020    Procedure: INJECTION, STEROID Right SI Joint Block and Steroid Injection;  Surgeon: Mk George MD;  Location: [facility] OR;  Service: Neurosurgery;  Laterality: Right;  Procedure: Right SI Joint Block and Steroid Injection   SUrgery Time: 30 Min  LOS: 0  Anesthesia: MAC  Radiology: C-arm  Bed: Tomer 4 Poster  Position: Prone    INJECTION OF STEROID Right 9/28/2021    Procedure: INJECTION, STEROID Right SI joint block & steroid injection;  Surgeon: Mk George MD;  Location: [facility] OR;  Service: Neurosurgery;  Laterality: Right;  Procedure: Right SI joint block & steroid injection  Surgery Time: 30m  Anesthesia: Local MAC  Radiology: C-arm  Bed: Regular  Position: Prone    LAPAROSCOPIC CHOLECYSTECTOMY N/A 6/23/2023    Procedure: CHOLECYSTECTOMY, LAPAROSCOPIC;  Surgeon: Richard Penny MD;  Location: [address];  Service: General;  Laterality: N/A;    left foot surgery      left wrist surgery      LYSIS OF ADHESIONS N/A 2/19/2020    Procedure: LYSIS, ADHESIONS;  Surgeon: Robin Boyd MD;  Location: [address];  Service: Urology;  Laterality: N/A;    NASAL SEPTUM SURGERY  5/7/15    OPEN REDUCTION AND INTERNAL FIXATION (ORIF) OF FRACTURE OF PROXIMAL HUMERUS Left 7/12/2023    Procedure: ORIF, FRACTURE, HUMERUS, PROXIMAL ;  Surgeon: Tomasz Leary MD;  Location: [address];  Service: Orthopedics;  Laterality: Left;    PERCUTANEOUS NEPHROSTOMY Left 4/21/2019    Procedure: Creation, Nephrostomy, Percutaneous;  Surgeon: Karina Surgeon;  Location: [facility];  Service: Anesthesiology;  Laterality: Left;    REPAIR OF URETER  4/12/2019    Procedure: REPAIR, URETER;  Surgeon: Mk George MD;  Location: [address];  Service: Neurosurgery;;    REPLACEMENT OF NEPHROSTOMY TUBE N/A 7/18/2019    Procedure: REPLACEMENT, NEPHROSTOMY TUBE;  Surgeon: Dest[place] Diagnostic Provider;  Location: [address];  Service: Anesthesiology;  Laterality: N/A;   188    REPLACEMENT OF NEPHROSTOMY TUBE N/A 7/24/2019    Procedure: REPLACEMENT, NEPHROSTOMY TUBE;  Surgeon: [facility] Diagnostic Provider;  Location: [facility] OR 2ND FLR;  Service: Anesthesiology;  Laterality: N/A;  188    REPLACEMENT OF NEPHROSTOMY TUBE N/A 10/7/2019    Procedure: REPLACEMENT, NEPHROSTOMY TUBE;  Surgeon: Dos Diagnostic Provider;  Location: [facility] OR 2ND FLR;  Service: Anesthesiology;  Laterality: N/A;  189    REPLACEMENT OF NEPHROSTOMY TUBE N/A 11/25/2019    Procedure: REPLACEMENT, NEPHROSTOMY TUBE;  Surgeon: [facility] Diagnostic Provider;  Location: [facility] OR 2ND FLR;  Service: Anesthesiology;  Laterality: N/A;  Room 188/Bessy    REPLACEMENT OF NEPHROSTOMY TUBE Right 2/19/2020    Procedure: REPLACEMENT, NEPHROSTOMY TUBE removal removal;  Surgeon: Robin Boyd MD;  [facility];  Service: Urology;  Laterality: Right;    rt elbow surgery      S/P LAD COATED STENT  05/14/2010    6 total     S/P OM1 STENT  08/2003    SINUS SURGERY      F.E.S.S.    TRACHEOSTOMY N/A 5/2/2019    Procedure: CREATION, TRACHEOSTOMY;  Surgeon: Sean Ruano MD;  [facility];  Service: General;  Laterality: N/A;    TUBAL LIGATION      URETERAL REIMPLANTION Left 2/19/2020    Procedure: REIMPLANTATION, URETER WITH PSOAS HITCH;  Surgeon: Robin Boyd MD;  [facility];  Service: Urology;  Laterality: Left;       Review of patient's allergies indicates:   Allergen Reactions    Milk containing products (dairy)      Causes GI distress       Current Facility-Administered Medications on File Prior to Encounter   Medication    0.9%  NaCl infusion    fentaNYL 50 mcg/mL injection  mcg    midazolam (VERSED) 1 mg/mL injection 0.5-4 mg     Current Outpatient Medications on File Prior to Encounter   Medication Sig    ACCU-CHEK EDIN PLUS METER Misc     acetaminophen (TYLENOL) 650 MG TbSR Take 1 tablet (650 mg total) by mouth every 8 (eight) hours.    albuterol (PROVENTIL/VENTOLIN HFA)  90 mcg/actuation inhaler Inhale 2 puffs into the lungs every 6 (six) hours as needed for Wheezing.    amLODIPine (NORVASC) 10 MG tablet Take 1 tablet (10 mg total) by mouth once daily.    apixaban (ELIQUIS) 2.5 mg Tab Take 1 tablet (2.5 mg total) by mouth 2 (two) times daily.    aspirin 81 mg Tab Take 81 mg by mouth once daily. 1 Tablet Oral Every day    atorvastatin (LIPITOR) 40 MG tablet Take 1 tablet (40 mg total) by mouth every evening.    blood sugar diagnostic (ACCU-CHEK EDIN PLUS TEST STRP) Strp TEST BLOOD SUGARS 4 TIMES DAILY    celecoxib (CELEBREX) 200 MG capsule Take 1 capsule (200 mg total) by mouth once daily.    cilostazoL (PLETAL) 100 MG Tab Take 1 tablet (100 mg total) by mouth 2 (two) times daily.    dapagliflozin (FARXIGA) 10 mg tablet Take 1 tablet (10 mg total) by mouth once daily. (Patient not taking: Reported on 7/10/2023)    diclofenac sodium (VOLTAREN) 1 % Gel Apply 2 g topically 3 (three) times daily. Apply to the area of pain 2-3x per day or night as needed (Patient not taking: Reported on 7/10/2023)    EScitalopram oxalate (LEXAPRO) 10 MG tablet Take 1 tablet (10 mg total) by mouth once daily.    gabapentin (NEURONTIN) 300 MG capsule Take 1 capsule (300 mg) in the morning and 2 capsules (600 mg) in the evening (Patient not taking: Reported on 7/11/2023)    glipiZIDE (GLUCOTROL) 10 MG TR24 Take 1 tablet (10 mg total) by mouth daily with breakfast.    hydrALAZINE (APRESOLINE) 25 MG tablet Take 1 tablet (25 mg total) by mouth every 12 (twelve) hours.    insulin detemir U-100, Levemir, (LEVEMIR FLEXPEN) 100 unit/mL (3 mL) InPn pen Inject 14 Units into the skin every evening. (Patient taking differently: Inject into the skin every evening. Adjusted dose based on glucose)    isosorbide mononitrate (ISMO,MONOKET) 10 mg tablet Take 1 tablet (10 mg total) by mouth once daily.    metoprolol tartrate (LOPRESSOR) 50 MG tablet Take 50 mg by mouth 2 (two) times daily.    multivitamin  "(THERAGRAN) per tablet Take 1 tablet by mouth once daily.    oxyCODONE (ROXICODONE) 5 MG immediate release tablet Take 1 tablet (5 mg total) by mouth every 4 (four) hours as needed for Pain.    pen needle, diabetic (NOVOTWIST) 32 gauge x 1/5" Ndle Use 1 needle to inject insulin four times daily    pen needle, diabetic 32 gauge x 5/32" Ndle Use as directed     Family History       Problem Relation (Age of Onset)    Diabetes Mother, Father, Sister, Brother, Sister    Heart attack Father    Heart disease Mother    Leukemia Father    No Known Problems Sister, Brother, Brother, Maternal Grandmother, Maternal Grandfather, Paternal Grandmother, Paternal Grandfather, Son, Son, Maternal Aunt, Maternal Uncle, Paternal Aunt, Paternal Uncle          Tobacco Use    Smoking status: Never    Smokeless tobacco: Never   Substance and Sexual Activity    Alcohol use: No     Alcohol/week: 0.0 standard drinks of alcohol    Drug use: No    Sexual activity: Yes     Partners: Male     Birth control/protection: Post-menopausal     Comment:      Review of Systems   Constitutional: Positive for malaise/fatigue. Negative for chills, fever and night sweats.   HENT:  Negative for sore throat.    Cardiovascular:  Negative for chest pain, dyspnea on exertion, leg swelling, near-syncope, orthopnea, palpitations and syncope.   Respiratory:  Negative for cough, hemoptysis, shortness of breath, snoring, sputum production and wheezing.    Hematologic/Lymphatic: Negative for adenopathy and bleeding problem. Does not bruise/bleed easily.   Musculoskeletal:  Negative for arthritis, back pain, joint pain and joint swelling.   Gastrointestinal:  Positive for nausea. Negative for bloating, abdominal pain, constipation and diarrhea.   Genitourinary:  Negative for dysuria, frequency and hematuria.   Neurological:  Negative for dizziness, focal weakness, headaches, light-headedness, loss of balance, numbness and weakness.     Objective:     Vital " Signs (Most Recent):  Temp: 96.8 °F (36 °C) (08/03/23 0736)  Pulse: 78 (08/03/23 1100)  Resp: (!) 22 (08/03/23 1100)  BP: 121/65 (08/03/23 1100)  SpO2: 100 % (08/03/23 1100) Vital Signs (24h Range):  Temp:  [96.8 °F (36 °C)-98 °F (36.7 °C)] 96.8 °F (36 °C)  Pulse:  [] 78  Resp:  [16-22] 22  SpO2:  [84 %-100 %] 100 %  BP: ()/(43-82) 121/65     Weight: 58.4 kg (128 lb 12 oz)  Body mass index is 22.1 kg/m².    SpO2: 100 %         Intake/Output Summary (Last 24 hours) at 8/3/2023 1136  Last data filed at 8/3/2023 0745  Gross per 24 hour   Intake 1135.9 ml   Output 240 ml   Net 895.9 ml       Lines/Drains/Airways       Peripheral Intravenous Line  Duration                  Peripheral IV - Single Lumen 08/02/23 1747 22 G Posterior;Right Hand <1 day         Peripheral IV - Single Lumen 08/02/23 2211 20 G Anterior;Proximal;Right Forearm <1 day         Peripheral IV - Single Lumen 08/03/23 1054 20 G Anterior;Distal;Right Upper Arm <1 day                     Physical Exam  Vitals and nursing note reviewed.   Constitutional:       General: She is not in acute distress.     Appearance: She is not toxic-appearing or diaphoretic.   HENT:      Mouth/Throat:      Mouth: Mucous membranes are moist.      Pharynx: Oropharynx is clear.   Eyes:      Extraocular Movements: Extraocular movements intact.      Conjunctiva/sclera: Conjunctivae normal.   Cardiovascular:      Rate and Rhythm: Normal rate and regular rhythm.   Pulmonary:      Effort: Pulmonary effort is normal. No respiratory distress.      Breath sounds: No wheezing.   Abdominal:      General: There is no distension.      Palpations: Abdomen is soft.      Tenderness: There is no guarding.      Comments: Nonacute abdominal exam   Musculoskeletal:      Right lower leg: No edema.      Left lower leg: No edema.   Skin:     General: Skin is warm and dry.   Neurological:      Mental Status: She is alert. Mental status is at baseline.      Cranial Nerves: No dysarthria.    Psychiatric:         Mood and Affect: Mood normal.         Behavior: Behavior normal.          Significant Labs: All pertinent lab results from the last 24 hours have been reviewed.    Significant Imaging:  All relevant imaging reviewed    Assessment and Plan:     * NSTEMI (non-ST elevated myocardial infarction)  Patient presented with concerns for NSTEMI, admitted to hospital medicine.  [facility] on 8/3 with ramus lesion of 70%, ostial CX to Prox Cx lesion was 99% stenosed.  EF calculated to be 45%.      - Medical management with ASA/Brillinta, statin  - tirofiban gtt, with noted LCx thrombus  - Repatha on discharge  - IC following  - Consider XRT to ramus ISR at later time    Liver lesion  F/u hepatology recs and labs  - f/u imaging per hepatology    Anemia  Hgb around baseline 7-8  Trend Hgb  Transfuse if Hgb <7    History of pancreatitis  Recent (6/2023) suspected biliary pancreatitis and biliary stricture treated with biliary sphincterotomy, biliary stent, and cholecystectomy.  CT A/P with new innumerable hepatic lesions:  1. Innumerable low attenuating hepatic lesions, new since the previous exam.  Malignancy remains a concern however given short-term development, correlation is needed to exclude multifocal infection/abscess in this patient status post bile duct stent placement and cholecystectomy.  Please note, there is a low attenuating focus within the gallbladder fossa, similar to the foci throughout the hepatic parenchyma..  2. Pancreatic ductal dilatation up to 4 mm, minimally increased compared to prior.  There is fullness of the pancreatic head, underlying mass is not excluded, correlation with recent ERCP advised.  3. Simple and complex bilateral renal cysts.  4. Biliary stent in place with expected pneumobilia.  5. Cholecystectomy with scattered fluid about the gallbladder fossa.  6. Moderate stool throughout the colon, may reflect developing constipation.  7.  Additional findings as above.       -  concern for lesions seen on CT A/P  - hepatology consulted, appreciate assistance  - Zosyn  - F/u cultures  - serial abdominal exams  - consider MRI liver protocol depending hepatology recs    Type 2 diabetes mellitus with chronic kidney disease  -Last A1c reviewed-   Lab Results   Component Value Date    HGBA1C 8.8 (H) 07/10/2023       Home Antihyperglycemic Regiment:  -Farxiga, glipizide, insulin daily at home    Inpatient Antihyperglycemic Regiment:  Antihyperglycemics (From admission, onward)    Start     Stop Route Frequency Ordered    08/02/23 2326  insulin aspart U-100 pen 0-5 Units         -- SubQ Every 6 hours PRN 08/02/23 2227        - Titrate and addition of insulin as needed for BG goal 140-180  - SSI with POCT accuchecks ACHS and Diabetic diet 2000 beti  - Diabetic nutritional counseling given     Blood Sugars (AccuCheck):  Recent Labs     08/02/23  2303   POCTGLUCOSE 188*         PAD (peripheral artery disease)  - reports no longer taking cilostazol  - continue ASA and statin      Diabetic peripheral neuropathy associated with type 2 diabetes mellitus  Continue home gabapentin    Carotid stenosis, bilateral  See NSTEMI    Sleep apnea  Not using CPAP at home.  Outpatient f/u and sleep medicine referral needed      CAD (coronary artery disease)  See NSTEMI for assessment and plan  Home meds include ASA 81, atorva 40, Eliquis 2.5 (for DVT but also documented as being for stents), was on cilostazol at one point but reports no longer taking      Hyperlipidemia associated with type 2 diabetes mellitus  Continue home lipitor    Hypertension associated with diabetes  Home regimen of amlodipine, Lopressor, hydralazine        VTE Risk Mitigation (From admission, onward)         Ordered     IP VTE HIGH RISK PATIENT  Once         08/02/23 2147     Place sequential compression device  Until discontinued         08/02/23 2147                Blayne Serrano MD  Cardiology   [facility]

## 2023-08-03 NOTE — NURSING
RN intro self to pt. Pt AO4. Pt denies any chest pain, n/v, distress at this time. Pt has rishabh NPO. Heparin stopped at 0730. Family at bedside. Pt heading to Cath lab. Pt transferred to ICU bed 3078 from cath lab, Report given to ICU RN Het.

## 2023-08-03 NOTE — ASSESSMENT & PLAN NOTE
- home regimen includes amlodipine, metoprolol tartrate, and hydralazine  - per cardiology, continue amlodipine and metoprolol ONLY IF lactate is normal and patient does not show signs of CG shock; cardiology bedside echo in ER did show newly depressed EF so will be cautious.   - continue home hydralazine

## 2023-08-03 NOTE — NURSING TRANSFER
Nursing Transfer Note          Nurse giving handoff:Dottie  Nurse receiving handoff:Valencia    Reason patient is being transferred: for admission and further care    Transfer From: ED    Transfer via stretcher    Transfer with cardiac monitoring and infusing heparin    Transported by transport tech    Telemetry:   Order for Tele Monitor? Yes    4eyes on Skin: yes    Medicines sent: infusing bag of heparin    Any special needs or follow-up needed: pain management and cardiac monitoring    Patient belongings transferred with patient: Yes    Chart send with patient: Yes    Notified: spouse at bedside    Patient reassessed at: 8/2/2023 0639      Upon arrival to floor: cardiac monitor applied, patient oriented to room, call bell in reach, and bed in lowest position

## 2023-08-03 NOTE — ED NOTES
"Patient does not need to urinate yet nor has she provided urine.  RN asked about toileting and patient stated "I'm okay right not".   "

## 2023-08-03 NOTE — ASSESSMENT & PLAN NOTE
-Last A1c reviewed-   Lab Results   Component Value Date    HGBA1C 8.8 (H) 07/10/2023       Home Antihyperglycemic Regiment:  -Farxiga, glipizide, insulin daily at home    Inpatient Antihyperglycemic Regiment:  Antihyperglycemics (From admission, onward)    Start     Stop Route Frequency Ordered    08/02/23 2326  insulin aspart U-100 pen 0-5 Units         -- SubQ Every 6 hours PRN 08/02/23 2227        - Titrate and addition of insulin as needed for BG goal 140-180  - SSI with POCT accuchecks ACHS and Diabetic diet 2000 beti  - Diabetic nutritional counseling given     Blood Sugars (AccuCheck):  Recent Labs     08/02/23  2303   POCTGLUCOSE 188*

## 2023-08-03 NOTE — PROGRESS NOTES
1615- MD Alysa notified of patient not making urine since arrival to CICU at 1030. Bladder scanned patient to find 40mL in bladder.     MD Alysa order to increase oral fluid intake and administer 250mL bolus of Lactated Ringers. RN will administer after additional IV access is obtained.

## 2023-08-03 NOTE — ASSESSMENT & PLAN NOTE
Patient presented with concerns for NSTEMI, admitted to hospital medicine.  [facility] on 8/3 with ramus lesion of 70%, ostial CX to Prox Cx lesion was 99% stenosed.  EF calculated to be 45%.      - Medical management with ASA/Brillinta, statin  - tirofiban gtt, with noted LCx thrombus  - Repatha on discharge  - IC following  - Consider XRT to ramus ISR at later time

## 2023-08-03 NOTE — HPI
Mariann Huff is a 63 y.o. female with CAD s/p GINGER to ostial LAD in 2014, HTN, HLD, DM2, MURTAZA who presented to the ED with vomiting x 1 day. She also feels tired and her appetite lately has decreased. She reports losing weight as well. No cardiac symptoms at this time. No CP or SOB. Her EKG in the ED showed transient ST depressions in septal leads. Trop went up to 0.3 -> 2.2. Echo in the ED by me showed new EF drop to about 40% with regional WMA in anterolateral, apical and anterior segments. She also has ischemic MR likely from tethering of posterior leaflet and a possible LV thrombus however formal echo with contrast to be done tomorrow. CVP 3 by echo and she has normal BP and HR at this time. She denies any chest symptoms. Prior Cath in 2014 with LAD 99% lesion which was stented, lcx 95%, ramus 80%; no recent stress tests.    Accession #: [id number]  Transthoracic echo (TTE) 5/26/2023  The estimated ejection fraction is 55%.  The quantitatively derived ejection fraction is 52%.  Normal right ventricular size with normal right ventricular systolic function.  Normal left ventricular diastolic function.  The left ventricle is normal in size with normal systolic function.  Normal central venous pressure (3 mmHg).      Patient with C on 8/3 with multivessel disease with no significant stenosis and plan for medical management without stents placed.  Transferred to CCU service for continued medical management.

## 2023-08-03 NOTE — CONSULTS
[facility] - [facility] Step[facility]  Interventional Cardiology  Consult Note    Patient Name: Mariann Huff  MRN: [id number]  Admission Date: 8/2/2023  Hospital Length of Stay: 1 days  Code Status: Prior   Attending Provider: Ayala Schofield MD  Consulting Provider: Aron Raya MD  Primary Care Physician: Jasbir Haney MD  Principal Problem:NSTEMI (non-ST elevated myocardial infarction)    Patient information was obtained from patient and ER records.     Inpatient consult to Interventional Cardiology  Consult performed by: Aron Raya MD  Consult ordered by: Karla Amin MD  Reason for consult: nstemi        Subjective:     Chief Complaint:  Hepatic lesions     HPI:  Consult Reason: NSTEMI    63 y.o. female with CAD s/p GINGER to LAD in 2014 (also Lcx 95%, ramus 80%), bilateral lower extremity PAD (2022, 100% R NANCY, 100% R PTA, 100% L PTA, 100% L Peroneal; eliquis 2,5mg), HTN, HLD, DM2, MURTAZA admitted for multiple hepatic lesions c/f metastases vs infections. Patient feeling fatigue/nausea; Interventional cardiology consulted for troponins to 29k, and potential EF drop to 40% in ED. Potential WMA and LV thrombus which is pending formal echo evaluation. EKG shows lateral ST changes. Prior Cath in 2014 with LAD 99% lesion which was stented, lcx 95%, ramus 80%; no recent stress tests. Currently being treated with aspirin, brillinta, heparin gtt at 10PM. Agreeable to procedure.     Cardiac home meds: amlodipine, eliquis 2.5mg, aspirin, atorvastastin 40mg, cilostazol, imdur 10, metop 40mg  EF 40% per POC, Trops 29k, Cr 0.8, p lt 395, hgb 8.0, ,   Allergies: none to contrast  Access: No issues      Past Medical History:   Diagnosis Date    Anticoagulant long-term use     Asthma     Back pain     Bradycardia, unspecified 05/08/2019    The etiology of the bradycardic episode is unclear.  The have appear to be respiratory in origin (at least the 1st episode).  We will continue to monitor carefully.  We are awaiting  evaluation by Cardiology.      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, with long-term current use of insulin 2020       Past Surgical History:   Procedure Laterality Date    ANTEGRADE NEPHROSTOGRAPHY Left 2019    Procedure: Nephrostogram - antegrade;  Surgeon: Robin Boyd MD;  Location: [address];  Service: Urology;  Laterality: Left;    BRONCHOSCOPY N/A 2019    Procedure: BRONCHOSCOPY;  Surgeon: Sean Ruano MD;  Location: [address];  Service: General;  Laterality: N/A;    CARDIAC CATHETERIZATION      CATARACT EXTRACTION      cataract extraction left eye      cataracts      CAUDAL EPIDURAL STEROID INJECTION N/A 2019    Procedure: Injection-steroid-epidural-caudal;  Surgeon: Dave Bentley Jr., MD;  Location: [facility];  Service: Pain Management;  Laterality: N/A;     SECTION, LOW TRANSVERSE      COLONOSCOPY N/A 2019    Procedure: COLONOSCOPY;  Surgeon: Al Alaniz MD;  Location: [facility] ([address] [place] [address]);  Service: Endoscopy;  Laterality: N/A;    CORONARY ANGIOPLASTY      CYSTOGRAM N/A 2019    Procedure: CYSTOGRAM INTRAOP;  Surgeon: Robin Boyd MD;  Location: [address];  Service: Urology;  Laterality: N/A;  1 HOUR    CYSTOSCOPY W/ RETROGRADES Left 2019    Procedure: CYSTOSCOPY, WITH RETROGRADE PYELOGRAM;  Surgeon: Robin Boyd MD;  Location:  [facility] OR 2ND FLR;  Service: Urology;  Laterality: Left;  fluro    ENDOSCOPIC ULTRASOUND OF UPPER GASTROINTESTINAL TRACT N/A 6/15/2023    Procedure: ULTRASOUND, UPPER GI TRACT, ENDOSCOPIC;  Surgeon: Lisa Kim MD;  Location: [facility] ENDO (2ND FLR);  Service: Endoscopy;  Laterality: N/A;    ERCP N/A 6/15/2023    Procedure: ERCP (ENDOSCOPIC RETROGRADE CHOLANGIOPANCREATOGRAPHY);  Surgeon: Lisa Kim MD;  Location: [facility] ENDO (2ND FLR);  Service: Endoscopy;  Laterality: N/A;    ESOPHAGOGASTRODUODENOSCOPY W/ PEG  5/2/2019    Procedure: EGD, WITH PEG TUBE INSERTION;  Surgeon: Sean Ruano MD;  Location: [facility] OR 2ND FLR;  Service: General;;    EXCISION TURBINATE, SUBMUCOUS      FLEXIBLE SIGMOIDOSCOPY N/A 5/13/2019    Procedure: SIGMOIDOSCOPY, FLEXIBLE;  Surgeon: ALBERTO Amin MD;  Location: [facility] (2ND FLR);  Service: Endoscopy;  Laterality: N/A;    FLEXIBLE SIGMOIDOSCOPY N/A 5/21/2019    Procedure: SIGMOIDOSCOPY, FLEXIBLE;  Surgeon: ALBERTO Amin MD;  Location: [facility] ENDO (2ND FLR);  Service: Endoscopy;  Laterality: N/A;    FUSION OF LUMBAR SPINE BY ANTERIOR APPROACH Left 4/12/2019    Procedure: FUSION, SPINE, LUMBAR, ANTERIOR APPROACH Left L5-S1 Anterior to Psoa Interbody Fusion, L5-S1 Posterior Instrumentation;  Surgeon: Mk George MD;  [address];  Service: Neurosurgery;  Laterality: Left;  Porcedure:  Left L5-S1 Anterior to Psoa Interbody Fusion, L5-S1 Posterior Instrumentation  Surgery Time: 4 Hrs  LOS: 2-3  Anesthesia: General   Blood: Type & Screen  R    HAND SURGERY Left     HAND SURGERY Right     torn ligament repair/ Dr. Yeboah    HYSTERECTOMY      INJECTION OF STEROID Right 12/10/2020    Procedure: INJECTION, STEROID Right SI Joint Block and Steroid Injection;  Surgeon: Mk George MD;  Location: [facility];  Service: Neurosurgery;  Laterality: Right;  Procedure: Right SI Joint Block and Steroid Injection   SUrgery Time: 30 Min  LOS: 0  Anesthesia: MAC  Radiology:  C-arm  Bed: [place] 4 Poster  Position: Prone    INJECTION OF STEROID Right 9/28/2021    Procedure: INJECTION, STEROID Right SI joint block & steroid injection;  Surgeon: Mk George MD;  Location: [facility];  Service: Neurosurgery;  Laterality: Right;  Procedure: Right SI joint block & steroid injection  Surgery Time: 30m  Anesthesia: Local MAC  Radiology: C-arm  Bed: Regular  Position: Prone    LAPAROSCOPIC CHOLECYSTECTOMY N/A 6/23/2023    Procedure: CHOLECYSTECTOMY, LAPAROSCOPIC;  Surgeon: Richard Penny MD;  Location: [address];  Service: General;  Laterality: N/A;    left foot surgery      left wrist surgery      LYSIS OF ADHESIONS N/A 2/19/2020    Procedure: LYSIS, ADHESIONS;  Surgeon: Robin Boyd MD;  Location: [address];  Service: Urology;  Laterality: N/A;    NASAL SEPTUM SURGERY  5/7/15    OPEN REDUCTION AND INTERNAL FIXATION (ORIF) OF FRACTURE OF PROXIMAL HUMERUS Left 7/12/2023    Procedure: ORIF, FRACTURE, HUMERUS, PROXIMAL ;  Surgeon: Tomasz Leary MD;  Location: [address];  Service: Orthopedics;  Laterality: Left;    PERCUTANEOUS NEPHROSTOMY Left 4/21/2019    Procedure: Creation, Nephrostomy, Percutaneous;  Surgeon: Karina Surgeon;  Location: [facility];  Service: Anesthesiology;  Laterality: Left;    REPAIR OF URETER  4/12/2019    Procedure: REPAIR, URETER;  Surgeon: Mk George MD;  Location: [address];  Service: Neurosurgery;;    REPLACEMENT OF NEPHROSTOMY TUBE N/A 7/18/2019    Procedure: REPLACEMENT, NEPHROSTOMY TUBE;  Surgeon: [facility] Diagnostic Provider;  Location: [address];  Service: Anesthesiology;  Laterality: N/A;  188    REPLACEMENT OF NEPHROSTOMY TUBE N/A 7/24/2019    Procedure: REPLACEMENT, NEPHROSTOMY TUBE;  Surgeon: [facility] Diagnostic Provider;  Location: [address];  Service: Anesthesiology;  Laterality: N/A;  188    REPLACEMENT OF NEPHROSTOMY TUBE N/A 10/7/2019    Procedure: REPLACEMENT, NEPHROSTOMY TUBE;  Surgeon: [facility] Diagnostic  Provider;  [address];  Service: Anesthesiology;  Laterality: N/A;  189    REPLACEMENT OF NEPHROSTOMY TUBE N/A 11/25/2019    Procedure: REPLACEMENT, NEPHROSTOMY TUBE;  Surgeon: [facility] Diagnostic Provider;  [facility];  Service: Anesthesiology;  Laterality: N/A;  Room 188/Bessy    REPLACEMENT OF NEPHROSTOMY TUBE Right 2/19/2020    Procedure: REPLACEMENT, NEPHROSTOMY TUBE removal removal;  Surgeon: Robin Boyd MD;  Location: [address];  Service: Urology;  Laterality: Right;    rt elbow surgery      S/P LAD COATED STENT  05/14/2010    6 total     S/P OM1 STENT  08/2003    SINUS SURGERY      F.E.S.S.    TRACHEOSTOMY N/A 5/2/2019    Procedure: CREATION, TRACHEOSTOMY;  Surgeon: Sean Ruano MD;  Location: [address];  Service: General;  Laterality: N/A;    TUBAL LIGATION      URETERAL REIMPLANTION Left 2/19/2020    Procedure: REIMPLANTATION, URETER WITH PSOAS HITCH;  Surgeon: Robin Boyd MD;  Location: [address];  Service: Urology;  Laterality: Left;       Review of patient's allergies indicates:   Allergen Reactions    Milk containing products (dairy)      Causes GI distress       PTA Medications   Medication Sig    ACCU-CHEK EDIN PLUS METER Misc     acetaminophen (TYLENOL) 650 MG TbSR Take 1 tablet (650 mg total) by mouth every 8 (eight) hours.    albuterol (PROVENTIL/VENTOLIN HFA) 90 mcg/actuation inhaler Inhale 2 puffs into the lungs every 6 (six) hours as needed for Wheezing.    amLODIPine (NORVASC) 10 MG tablet Take 1 tablet (10 mg total) by mouth once daily.    apixaban (ELIQUIS) 2.5 mg Tab Take 1 tablet (2.5 mg total) by mouth 2 (two) times daily.    aspirin 81 mg Tab Take 81 mg by mouth once daily. 1 Tablet Oral Every day    atorvastatin (LIPITOR) 40 MG tablet Take 1 tablet (40 mg total) by mouth every evening.    blood sugar diagnostic (ACCU-CHEK EDIN PLUS TEST STRP) Strp TEST BLOOD SUGARS 4 TIMES DAILY    celecoxib (CELEBREX) 200 MG capsule Take 1 capsule  "(200 mg total) by mouth once daily.    cilostazoL (PLETAL) 100 MG Tab Take 1 tablet (100 mg total) by mouth 2 (two) times daily.    dapagliflozin (FARXIGA) 10 mg tablet Take 1 tablet (10 mg total) by mouth once daily. (Patient not taking: Reported on 7/10/2023)    diclofenac sodium (VOLTAREN) 1 % Gel Apply 2 g topically 3 (three) times daily. Apply to the area of pain 2-3x per day or night as needed (Patient not taking: Reported on 7/10/2023)    EScitalopram oxalate (LEXAPRO) 10 MG tablet Take 1 tablet (10 mg total) by mouth once daily.    gabapentin (NEURONTIN) 300 MG capsule Take 1 capsule (300 mg) in the morning and 2 capsules (600 mg) in the evening (Patient not taking: Reported on 7/11/2023)    glipiZIDE (GLUCOTROL) 10 MG TR24 Take 1 tablet (10 mg total) by mouth daily with breakfast.    hydrALAZINE (APRESOLINE) 25 MG tablet Take 1 tablet (25 mg total) by mouth every 12 (twelve) hours.    insulin detemir U-100, Levemir, (LEVEMIR FLEXPEN) 100 unit/mL (3 mL) InPn pen Inject 14 Units into the skin every evening. (Patient taking differently: Inject into the skin every evening. Adjusted dose based on glucose)    isosorbide mononitrate (ISMO,MONOKET) 10 mg tablet Take 1 tablet (10 mg total) by mouth once daily.    metoprolol tartrate (LOPRESSOR) 50 MG tablet Take 50 mg by mouth 2 (two) times daily.    multivitamin (THERAGRAN) per tablet Take 1 tablet by mouth once daily.    oxyCODONE (ROXICODONE) 5 MG immediate release tablet Take 1 tablet (5 mg total) by mouth every 4 (four) hours as needed for Pain.    pen needle, diabetic (NOVOTWIST) 32 gauge x 1/5" Ndle Use 1 needle to inject insulin four times daily    pen needle, diabetic 32 gauge x 5/32" Ndle Use as directed     Family History       Problem Relation (Age of Onset)    Diabetes Mother, Father, Sister, Brother, Sister    Heart attack Father    Heart disease Mother    Leukemia Father    No Known Problems Sister, Brother, Brother, Maternal Grandmother, Maternal " Grandfather, Paternal Grandmother, Paternal Grandfather, Son, Son, Maternal Aunt, Maternal Uncle, Paternal Aunt, Paternal Uncle          Tobacco Use    Smoking status: Never    Smokeless tobacco: Never   Substance and Sexual Activity    Alcohol use: No     Alcohol/week: 0.0 standard drinks of alcohol    Drug use: No    Sexual activity: Yes     Partners: Male     Birth control/protection: Post-menopausal     Comment:      Review of Systems   Constitutional: Positive for malaise/fatigue. Negative for chills, fever and night sweats.   HENT:  Negative for congestion, nosebleeds, sore throat, stridor and tinnitus.    Eyes:  Negative for blurred vision, discharge, double vision, pain, vision loss in left eye, vision loss in right eye, visual disturbance and visual halos.   Cardiovascular:  Negative for chest pain, dyspnea on exertion, leg swelling, near-syncope, orthopnea, palpitations and syncope.   Respiratory:  Negative for cough, hemoptysis, shortness of breath, snoring, sputum production and wheezing.    Endocrine: Negative for cold intolerance, heat intolerance and polydipsia.   Hematologic/Lymphatic: Negative for adenopathy and bleeding problem. Does not bruise/bleed easily.   Skin:  Negative for color change, dry skin, flushing, poor wound healing and suspicious lesions.   Musculoskeletal:  Negative for arthritis, back pain, gout, joint pain and joint swelling.   Gastrointestinal:  Positive for nausea. Negative for bloating, abdominal pain, constipation and diarrhea.   Genitourinary:  Negative for dysuria, frequency and hematuria.   Neurological:  Negative for dizziness, focal weakness, headaches, light-headedness, loss of balance, numbness and weakness.   Psychiatric/Behavioral:  Negative for altered mental status, hallucinations and hypervigilance. The patient is not nervous/anxious.      Objective:     Vital Signs (Most Recent):  Temp: 96.8 °F (36 °C) (08/03/23 0736)  Pulse: 90 (08/03/23 0736)  Resp: 18  (08/03/23 0736)  BP: 130/64 (08/03/23 0736)  SpO2: 98 % (08/03/23 0736) Vital Signs (24h Range):  Temp:  [96.8 °F (36 °C)-98 °F (36.7 °C)] 96.8 °F (36 °C)  Pulse:  [] 90  Resp:  [16-20] 18  SpO2:  [84 %-100 %] 98 %  BP: ()/(43-82) 130/64     Weight: 58.4 kg (128 lb 12 oz)  Body mass index is 22.1 kg/m².    SpO2: 98 %         Intake/Output Summary (Last 24 hours) at 8/3/2023 0740  Last data filed at 8/3/2023 0627  Gross per 24 hour   Intake 1040 ml   Output 240 ml   Net 800 ml       Lines/Drains/Airways       Peripheral Intravenous Line  Duration                  Peripheral IV - Single Lumen 08/02/23 1747 22 G Posterior;Right Hand <1 day         Peripheral IV - Single Lumen 08/02/23 2211 20 G Anterior;Proximal;Right Forearm <1 day                     Physical Exam  Constitutional:       General: She is not in acute distress.     Appearance: Normal appearance. She is normal weight. She is not toxic-appearing.   HENT:      Head: Normocephalic.   Eyes:      General:         Right eye: No discharge.         Left eye: No discharge.      Extraocular Movements: Extraocular movements intact.      Conjunctiva/sclera: Conjunctivae normal.      Pupils: Pupils are equal, round, and reactive to light.   Cardiovascular:      Rate and Rhythm: Normal rate and regular rhythm.      Pulses: Normal pulses.      Heart sounds: Normal heart sounds. No murmur heard.     No friction rub.   Pulmonary:      Effort: Pulmonary effort is normal. No respiratory distress.      Breath sounds: Normal breath sounds. No wheezing or rales.   Abdominal:      General: Abdomen is flat. Bowel sounds are normal. There is no distension.      Palpations: Abdomen is soft.      Tenderness: There is no abdominal tenderness.   Musculoskeletal:         General: No swelling, deformity or signs of injury. Normal range of motion.      Cervical back: Normal range of motion and neck supple. No rigidity.      Right lower leg: No edema.      Left lower leg: No  edema.   Lymphadenopathy:      Cervical: No cervical adenopathy.   Skin:     General: Skin is warm and dry.      Capillary Refill: Capillary refill takes less than 2 seconds.      Coloration: Skin is not jaundiced.      Findings: No bruising or lesion.   Neurological:      General: No focal deficit present.      Mental Status: She is oriented to person, place, and time. Mental status is at baseline.      Cranial Nerves: No cranial nerve deficit.      Motor: No weakness.   Psychiatric:         Mood and Affect: Mood normal.         Behavior: Behavior normal.         Thought Content: Thought content normal.         Judgment: Judgment normal.            Assessment and Plan:     Cardiac/Vascular  * NSTEMI (non-ST elevated myocardial infarction)  - Interventional cardiology consulted for troponins to 29k, and potential EF drop to 40% in ED. Potential WMA and LV thrombus which is pending formal echo evaluation.Will evaluate with PCI/LHC. Patient is a GINGER candidate  EKG shows lateral ST changes. Prior Cath in 2014 with LAD 99% lesion which was stented, lcx 95%, ramus 80%; no recent stress tests.  -63 y.o. female with CAD s/p GINGER to LAD in 2014 (also Lcx 95%, ramus 80%), bilateral lower extremity PAD (2022, 100% R NANCY, 100% R PTA, 100% L PTA, 100% L Peroneal; eliquis 2,5mg), HTN, HLD, DM2, MURTAZA admitted for multiple hepatic lesions c/f metastases vs infections.  - EF 40% per POC, Trops 29k, Cr 0.8, p lt 395, hgb 8.0, ,   - Anti-platelet Therapy: aspirin brillinta  - Access: R Radial, R fem.   - Allergies: No shellfish / Iodine contrast allergy  - Pre-Hydration: NS  - Pre-Op Med: Bendaryl 50mg pO   - All patient's questions were answered.  -The risks, benefits and alternatives of the procedure were explained to the patient.   -The risks of coronary angiography include but are not limited to: bleeding, infection, heart rhythm abnormalities, allergic reactions, kidney injury and potential need for dialysis, stroke and  death.   - Should stenting be indicated, the patient has agreed to dual anti-platelet therapy for 1-consecutive year with a drug-eluting stent and a minimum of 1-month with the use of a bare metal stent  - Additionally, pt is aware that non-compliance is likely to result in stent clotting with heart attack, heart failure, and/or death  -The risks of moderate sedation include hypotension, respiratory depression, arrhythmias, bronchospasm, and death.   - Informed consent was obtained and the  patient is agreeable to proceed with the procedure.        VTE Risk Mitigation (From admission, onward)           Ordered     heparin 25,000 units in dextrose 5% (100 units/ml) IV bolus from bag - ADDITIONAL PRN BOLUS - 60 units/kg (max bolus 4000 units)  As needed (PRN)        Question:  Heparin Infusion Adjustment (DO NOT MODIFY ANSWER)  Answer:  [email or web address]\Images\Pharmacy\HeparinInfusions\heparin LOW INTENSITY nomogram for [facility] [id number]    08/02/23 2014     heparin 25,000 units in dextrose 5% (100 units/ml) IV bolus from bag - ADDITIONAL PRN BOLUS - 30 units/kg (max bolus 4000 units)  As needed (PRN)        Question:  Heparin Infusion Adjustment (DO NOT MODIFY ANSWER)  Answer:  \[email or web address]\Images\Pharmacy\HeparinInfusions\heparin LOW INTENSITY nomogram for [facility] [id number]    08/02/23 2014     IP VTE HIGH RISK PATIENT  Once         08/02/23 2147     Place sequential compression device  Until discontinued         08/02/23 2147     heparin 25,000 units in dextrose 5% 250 mL (100 units/mL) infusion LOW INTENSITY nomogram - [facility]  Continuous        Question Answer Comment   Heparin Infusion Adjustment (DO NOT MODIFY ANSWER) [email or web address]\Images\Pharmacy\HeparinInfusions\heparin LOW INTENSITY nomogram for [facility] [id number]    Begin at (in units/kg/hr) 12        08/02/23 2014                    Thank you for your consult. I will follow-up with patient. Please contact us if you have any additional questions.    Aron Garland  MD [place]  Interventional Cardiology   [facility] - Cardiology Stepdown

## 2023-08-03 NOTE — CONSULTS
[facility]  Cardiology  Consult Note    Patient Name: Mariann Huff  MRN: [id number]  Admission Date: 8/2/2023  Hospital Length of Stay: 0 days  Code Status: Prior   Attending Provider: Ayala Schofield MD   Consulting Provider: Shen Reese MD  Primary Care Physician: Jasbir Haney MD  Principal Problem:NSTEMI (non-ST elevated myocardial infarction)    Patient information was obtained from patient, spouse/SO, ER records and primary team.     Inpatient consult to Cardiology  Consult performed by: Shen Reese MD  Consult ordered by: Karla Amin MD        Subjective:     Chief Complaint:  vomiting     HPI:   Mariann Huff is a 63 y.o. female with CAD s/p GINGER to ostial LAD in 2014, HTN, HLD, DM2, MURTAZA who presented to the ED with vomiting x 1 day. She also feels tired and her appetite lately has decreased. She reports losing weight as well. No cardiac symptoms at this time. No CP or SOB. Her EKG in the ED showed transient ST depressions in septal leads. Trop went up to 0.3 -> 2.2. Echo in the ED by me showed new EF drop to about 40% with regional WMA in anterolateral, apical and anterior segments. She also has ischemic MR likely from tethering of posterior leaflet and a possible LV thrombus however formal echo with contrast to be done tomorrow. CVP 3 by echo and she has normal BP and HR at this time. She denies any chest symptoms.     Transthoracic echo (TTE) 5/26/2023   The estimated ejection fraction is 55%.   The quantitatively derived ejection fraction is 52%.   Normal right ventricular size with normal right ventricular systolic function.   Normal left ventricular diastolic function.   The left ventricle is normal in size with normal systolic function.   Normal central venous pressure (3 mmHg).    Past Medical History:   Diagnosis Date    Anticoagulant long-term use     Asthma     Back pain     Bradycardia, unspecified 05/08/2019    The etiology of  the bradycardic episode is unclear.  The have appear to be respiratory in origin (at least the 1st episode).  We will continue to monitor carefully.  We are awaiting evaluation by Cardiology.     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, with long-term current use of insulin 2020       Past Surgical History:   Procedure Laterality Date    ANTEGRADE NEPHROSTOGRAPHY Left 2019    Procedure: Nephrostogram - antegrade;  Surgeon: Robin Boyd MD;  [address]  Service: Urology;  Laterality: Left;    BRONCHOSCOPY N/A 2019    Procedure: BRONCHOSCOPY;  Surgeon: Sean Ruano MD;  Location: [address];  Service: General;  Laterality: N/A;    CARDIAC CATHETERIZATION      CATARACT EXTRACTION      cataract extraction left eye      cataracts      CAUDAL EPIDURAL STEROID INJECTION N/A 2019    Procedure: Injection-steroid-epidural-caudal;  Surgeon: Dave Bentley Jr., MD;  Location: [facility] PAIN MGT;  Service: Pain Management;  Laterality: N/A;     SECTION, LOW TRANSVERSE      COLONOSCOPY N/A 2019    Procedure: COLONOSCOPY;  Surgeon: Al Alaniz MD;  Location: [facility] ENDO (2ND FLR);  Service: Endoscopy;  Laterality: N/A;    CORONARY ANGIOPLASTY      CYSTOGRAM N/A 2019    Procedure: CYSTOGRAM INTRAOP;  Surgeon: Robin Boyd MD;  Location: [facility] OR  2ND FLR;  Service: Urology;  Laterality: N/A;  1 HOUR    CYSTOSCOPY W/ RETROGRADES Left 12/11/2019    Procedure: CYSTOSCOPY, WITH RETROGRADE PYELOGRAM;  Surgeon: Robin Boyd MD;  Location: [facility] OR 2ND FLR;  Service: Urology;  Laterality: Left;  fluro    ENDOSCOPIC ULTRASOUND OF UPPER GASTROINTESTINAL TRACT N/A 6/15/2023    Procedure: ULTRASOUND, UPPER GI TRACT, ENDOSCOPIC;  Surgeon: Lisa Kim MD;  Location: [facility] ENDO (2ND FLR);  Service: Endoscopy;  Laterality: N/A;    ERCP N/A 6/15/2023    Procedure: ERCP (ENDOSCOPIC RETROGRADE CHOLANGIOPANCREATOGRAPHY);  Surgeon: Lisa Kim MD;  Location: [facility] ENDO (2ND FLR);  Service: Endoscopy;  Laterality: N/A;    ESOPHAGOGASTRODUODENOSCOPY W/ PEG  5/2/2019    Procedure: EGD, WITH PEG TUBE INSERTION;  Surgeon: Sean Ruano MD;  [facility];  Service: General;;    EXCISION TURBINATE, SUBMUCOUS      FLEXIBLE SIGMOIDOSCOPY N/A 5/13/2019    Procedure: SIGMOIDOSCOPY, FLEXIBLE;  Surgeon: ALBERTO Amin MD;  Location: [facility] ENDO (2ND FLR);  Service: Endoscopy;  Laterality: N/A;    FLEXIBLE SIGMOIDOSCOPY N/A 5/21/2019    Procedure: SIGMOIDOSCOPY, FLEXIBLE;  Surgeon: ALBERTO Amin MD;  Location: [facility] ENDO (2ND FLR);  Service: Endoscopy;  Laterality: N/A;    FUSION OF LUMBAR SPINE BY ANTERIOR APPROACH Left 4/12/2019    Procedure: FUSION, SPINE, LUMBAR, ANTERIOR APPROACH Left L5-S1 Anterior to Psoa Interbody Fusion, L5-S1 Posterior Instrumentation;  Surgeon: Mk George MD;  [facility];  Service: Neurosurgery;  Laterality: Left;  Porcedure:  Left L5-S1 Anterior to Psoa Interbody Fusion, L5-S1 Posterior Instrumentation  Surgery Time: 4 Hrs  LOS: 2-3  Anesthesia: General   Blood: Type & Screen  R    HAND SURGERY Left     HAND SURGERY Right     torn ligament repair/ Dr. Yeboah    HYSTERECTOMY      INJECTION OF STEROID Right 12/10/2020    Procedure: INJECTION, STEROID Right SI Joint Block and Steroid Injection;  Surgeon:  Mk George MD;  Location: [facility] OR;  Service: Neurosurgery;  Laterality: Right;  Procedure: Right SI Joint Block and Steroid Injection   SUrgery Time: 30 Min  LOS: 0  Anesthesia: MAC  Radiology: C-arm  Bed: Tomer 4 Poster  Position: Prone    INJECTION OF STEROID Right 9/28/2021    Procedure: INJECTION, STEROID Right SI joint block & steroid injection;  Surgeon: Mk George MD;  Location: [facility] OR;  Service: Neurosurgery;  Laterality: Right;  Procedure: Right SI joint block & steroid injection  Surgery Time: 30m  Anesthesia: Local MAC  Radiology: C-arm  Bed: Regular  Position: Prone    LAPAROSCOPIC CHOLECYSTECTOMY N/A 6/23/2023    Procedure: CHOLECYSTECTOMY, LAPAROSCOPIC;  Surgeon: Richard Penny MD;  Location: [address];  Service: General;  Laterality: N/A;    left foot surgery      left wrist surgery      LYSIS OF ADHESIONS N/A 2/19/2020    Procedure: LYSIS, ADHESIONS;  Surgeon: Robin Boyd MD;  Location: [address];  Service: Urology;  Laterality: N/A;    NASAL SEPTUM SURGERY  5/7/15    OPEN REDUCTION AND INTERNAL FIXATION (ORIF) OF FRACTURE OF PROXIMAL HUMERUS Left 7/12/2023    Procedure: ORIF, FRACTURE, HUMERUS, PROXIMAL ;  Surgeon: Tomasz Leary MD;  Location: [address];  Service: Orthopedics;  Laterality: Left;    PERCUTANEOUS NEPHROSTOMY Left 4/21/2019    Procedure: Creation, Nephrostomy, Percutaneous;  Surgeon: Karina Surgeon;  Location: [facility];  Service: Anesthesiology;  Laterality: Left;    REPAIR OF URETER  4/12/2019    Procedure: REPAIR, URETER;  Surgeon: Mk George MD;  [address];  Service: Neurosurgery;;    REPLACEMENT OF NEPHROSTOMY TUBE N/A 7/18/2019    Procedure: REPLACEMENT, NEPHROSTOMY TUBE;  Surgeon: [facility] Diagnostic Provider;  [address];  Service: Anesthesiology;  Laterality: N/A;  188    REPLACEMENT OF NEPHROSTOMY TUBE N/A 7/24/2019    Procedure: REPLACEMENT, NEPHROSTOMY TUBE;  Surgeon: [facility] Diagnostic  Provider;  [address];  Service: Anesthesiology;  Laterality: N/A;  188    REPLACEMENT OF NEPHROSTOMY TUBE N/A 10/7/2019    Procedure: REPLACEMENT, NEPHROSTOMY TUBE;  Surgeon: [facility] Diagnostic Provider;  [facility];  Service: Anesthesiology;  Laterality: N/A;  189    REPLACEMENT OF NEPHROSTOMY TUBE N/A 11/25/2019    Procedure: REPLACEMENT, NEPHROSTOMY TUBE;  Surgeon: [facility] Diagnostic Provider;  [facility];  Service: Anesthesiology;  Laterality: N/A;  Room 188/Bessy    REPLACEMENT OF NEPHROSTOMY TUBE Right 2/19/2020    Procedure: REPLACEMENT, NEPHROSTOMY TUBE removal removal;  Surgeon: Robin Boyd MD;  Location: [address];  Service: Urology;  Laterality: Right;    rt elbow surgery      S/P LAD COATED STENT  05/14/2010    6 total     S/P OM1 STENT  08/2003    SINUS SURGERY      F[place]S[facility]    TRACHEOSTOMY N/A 5/2/2019    Procedure: CREATION, TRACHEOSTOMY;  Surgeon: Sean Ruano MD;  Location: [address];  Service: General;  Laterality: N/A;    TUBAL LIGATION      URETERAL REIMPLANTION Left 2/19/2020    Procedure: REIMPLANTATION, URETER WITH PSOAS HITCH;  Surgeon: Robin Boyd MD;  Location: [address];  Service: Urology;  Laterality: Left;       Review of patient's allergies indicates:   Allergen Reactions    Milk containing products (dairy)      Causes GI distress       Current Facility-Administered Medications on File Prior to Encounter   Medication    0.9%  NaCl infusion    fentaNYL 50 mcg/mL injection  mcg    midazolam (VERSED) 1 mg/mL injection 0.5-4 mg     Current Outpatient Medications on File Prior to Encounter   Medication Sig    ACCU-CHEK EDIN PLUS METER Misc     acetaminophen (TYLENOL) 650 MG TbSR Take 1 tablet (650 mg total) by mouth every 8 (eight) hours.    albuterol (PROVENTIL/VENTOLIN HFA) 90 mcg/actuation inhaler Inhale 2 puffs into the lungs every 6 (six) hours as needed for Wheezing.    amLODIPine (NORVASC) 10  MG tablet Take 1 tablet (10 mg total) by mouth once daily.    apixaban (ELIQUIS) 2.5 mg Tab Take 1 tablet (2.5 mg total) by mouth 2 (two) times daily.    aspirin 81 mg Tab Take 81 mg by mouth once daily. 1 Tablet Oral Every day    atorvastatin (LIPITOR) 40 MG tablet Take 1 tablet (40 mg total) by mouth every evening.    blood sugar diagnostic (ACCU-CHEK EDIN PLUS TEST STRP) Strp TEST BLOOD SUGARS 4 TIMES DAILY    celecoxib (CELEBREX) 200 MG capsule Take 1 capsule (200 mg total) by mouth once daily.    cilostazoL (PLETAL) 100 MG Tab Take 1 tablet (100 mg total) by mouth 2 (two) times daily.    dapagliflozin (FARXIGA) 10 mg tablet Take 1 tablet (10 mg total) by mouth once daily. (Patient not taking: Reported on 7/10/2023)    diclofenac sodium (VOLTAREN) 1 % Gel Apply 2 g topically 3 (three) times daily. Apply to the area of pain 2-3x per day or night as needed (Patient not taking: Reported on 7/10/2023)    EScitalopram oxalate (LEXAPRO) 10 MG tablet Take 1 tablet (10 mg total) by mouth once daily.    gabapentin (NEURONTIN) 300 MG capsule Take 1 capsule (300 mg) in the morning and 2 capsules (600 mg) in the evening (Patient not taking: Reported on 7/11/2023)    glipiZIDE (GLUCOTROL) 10 MG TR24 Take 1 tablet (10 mg total) by mouth daily with breakfast.    hydrALAZINE (APRESOLINE) 25 MG tablet Take 1 tablet (25 mg total) by mouth every 12 (twelve) hours.    insulin detemir U-100, Levemir, (LEVEMIR FLEXPEN) 100 unit/mL (3 mL) InPn pen Inject 14 Units into the skin every evening. (Patient taking differently: Inject into the skin every evening. Adjusted dose based on glucose)    isosorbide mononitrate (ISMO,MONOKET) 10 mg tablet Take 1 tablet (10 mg total) by mouth once daily.    metoprolol tartrate (LOPRESSOR) 50 MG tablet Take 50 mg by mouth 2 (two) times daily.    multivitamin (THERAGRAN) per tablet Take 1 tablet by mouth once daily.    oxyCODONE (ROXICODONE) 5 MG immediate release tablet Take 1 tablet  "(5 mg total) by mouth every 4 (four) hours as needed for Pain.    pen needle, diabetic (NOVOTWIST) 32 gauge x 1/5" Ndle Use 1 needle to inject insulin four times daily    pen needle, diabetic 32 gauge x 5/32" Ndle Use as directed     Family History       Problem Relation (Age of Onset)    Diabetes Mother, Father, Sister, Brother, Sister    Heart attack Father    Heart disease Mother    Leukemia Father    No Known Problems Sister, Brother, Brother, Maternal Grandmother, Maternal Grandfather, Paternal Grandmother, Paternal Grandfather, Son, Son, Maternal Aunt, Maternal Uncle, Paternal Aunt, Paternal Uncle          Tobacco Use    Smoking status: Never    Smokeless tobacco: Never   Substance and Sexual Activity    Alcohol use: No     Alcohol/week: 0.0 standard drinks of alcohol    Drug use: No    Sexual activity: Yes     Partners: Male     Birth control/protection: Post-menopausal     Comment:      Review of Systems   Constitutional:  Positive for activity change, fatigue and unexpected weight change. Negative for chills and fever.   HENT:  Negative for congestion and sore throat.    Respiratory:  Negative for cough and shortness of breath.    Cardiovascular:  Negative for chest pain and palpitations.   Gastrointestinal:  Positive for abdominal pain (stinging, onset same time as vomiting) and vomiting (food particles, nonbloody). Negative for blood in stool, constipation and diarrhea.   Genitourinary:  Negative for dysuria and frequency.   Musculoskeletal:  Negative for arthralgias and myalgias.   Skin:  Negative for rash and wound.   Neurological:  Negative for dizziness and syncope.     Objective:     Vital Signs (Most Recent):  Temp: 97.1 °F (36.2 °C) (08/02/23 1436)  Pulse: 73 (08/02/23 2117)  Resp: 17 (08/02/23 2117)  BP: 119/71 (08/02/23 2117)  SpO2: 100 % (08/02/23 2117) Vital Signs (24h Range):  Temp:  [97.1 °F (36.2 °C)] 97.1 °F (36.2 °C)  Pulse:  [69-81] 73  Resp:  [16-20] 17  SpO2:  [84 %-100 %] " 100 %  BP: (106-140)/(56-82) 119/71     Weight: 63.5 kg (140 lb)  Body mass index is 24.03 kg/m².     Physical Exam  Vitals and nursing note reviewed.   Constitutional:       General: She is not in acute distress.     Appearance: She is ill-appearing. She is not toxic-appearing or diaphoretic.   Cardiovascular:      Rate and Rhythm: Normal rate and regular rhythm.   Pulmonary:      Effort: Pulmonary effort is normal. No respiratory distress.      Breath sounds: No wheezing.   Abdominal:      General: There is no distension.      Palpations: Abdomen is soft.      Tenderness: There is no guarding.      Comments: Nonacute abdominal exam   Musculoskeletal:      Right lower leg: No edema.      Left lower leg: No edema.   Skin:     General: Skin is warm and dry.   Neurological:      Mental Status: She is alert. Mental status is at baseline.      Cranial Nerves: No dysarthria.   Psychiatric:         Mood and Affect: Mood normal.         Behavior: Behavior normal.             Significant Labs: All pertinent labs within the past 24 hours have been reviewed.    Significant Imaging: I have reviewed all pertinent imaging results/findings within the past 24 hours.      Assessment and Plan:     * NSTEMI (non-ST elevated myocardial infarction)  -presented to the ED with vomiting x 1 day. She also feels tired and her appetite lately has decreased. -No cardiac symptoms at this time. No CP or SOB  - EKG in the ED showed transient ST depressions in septal leads  -Trop went up to 0.3 -> 2.2  - Echo in the ED by me showed new EF drop to about 40% with regional WMA in anterolateral, apical and anterior segments. She also has ischemic MR likely from tethering of posterior leaflet and a possible LV thrombus. CVP 3 by echo  -normal BP and HR at this time    Recommendations  -formal echo with contrast tomorrow  -got loaded with ASA and Brilinta already, 90 bid brilinta and 81 qd ASA from tomorrow  -continue hep gtt  -HI statin with lipid  panel and a1c if not done in the past three months   -consult to interventional cardiology and NPO midnight (does have liver lesions and possible pancretic head mass on CT abdomen, definite cause of which is not known at this time)  -obtain lactic acid x 1 and trend troponin until they peak      VTE Risk Mitigation (From admission, onward)         Ordered     heparin 25,000 units in dextrose 5% (100 units/ml) IV bolus from bag - ADDITIONAL PRN BOLUS - 60 units/kg (max bolus 4000 units)  As needed (PRN)        Question:  Heparin Infusion Adjustment (DO NOT MODIFY ANSWER)  Answer:  [email or web address]\[email or web address]\Images\Pharmacy\HeparinInfusions\heparin LOW INTENSITY nomogram for [facility] [id number]    08/02/23 2014     heparin 25,000 units in dextrose 5% (100 units/ml) IV bolus from bag - ADDITIONAL PRN BOLUS - 30 units/kg (max bolus 4000 units)  As needed (PRN)        Question:  Heparin Infusion Adjustment (DO NOT MODIFY ANSWER)  Answer:  \\[email or web address]\epic\Images\Pharmacy\HeparinInfusions\heparin LOW INTENSITY nomogram for [facility] [id number]    08/02/23 2014     IP VTE HIGH RISK PATIENT  Once         08/02/23 2147     Place sequential compression device  Until discontinued         08/02/23 2147     heparin 25,000 units in dextrose 5% 250 mL (100 units/mL) infusion LOW INTENSITY nomogram - [facility]  Continuous        Question Answer Comment   Heparin Infusion Adjustment (DO NOT MODIFY ANSWER) \\[email or web address]\[email or web address]\Images\Pharmacy\HeparinInfusions\heparin LOW INTENSITY nomogram for [facility] [id number]    Begin at (in units/kg/hr) 12        08/02/23 2014                Thank you for your consult. Discussed case with Dr Palumbo.   Please contact us if you have any additional questions.    Shen Reese MD  Cardiology   [facility]

## 2023-08-03 NOTE — SUBJECTIVE & OBJECTIVE
Past Medical History:   Diagnosis Date    Anticoagulant long-term use     Asthma     Back pain     Bradycardia, unspecified 2019    The etiology of the bradycardic episode is unclear.  The have appear to be respiratory in origin (at least the 1st episode).  We will continue to monitor carefully.  We are awaiting evaluation by Cardiology.      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, with long-term current use of insulin 2020       Past Surgical History:   Procedure Laterality Date    ANTEGRADE NEPHROSTOGRAPHY Left 2019    Procedure: Nephrostogram - antegrade;  Surgeon: Robin Boyd MD;  Location: [facility] OR [address] [place] [address];  Service: Urology;  Laterality: Left;    BRONCHOSCOPY N/A 2019    Procedure: BRONCHOSCOPY;  Surgeon: Sean Ruano MD;  Location: [facility] OR [address] [place] [address];  Service: General;  Laterality: N/A;    CARDIAC CATHETERIZATION      CATARACT EXTRACTION      cataract extraction left eye      cataracts      CAUDAL EPIDURAL STEROID INJECTION N/A 2019    Procedure: Injection-steroid-epidural-caudal;  Surgeon: Dave Bentley Jr., MD;  Location: [facility] PAIN MGT;  Service: Pain Management;  Laterality: N/A;     SECTION, LOW TRANSVERSE      COLONOSCOPY N/A 2019    Procedure: COLONOSCOPY;  Surgeon: Al Alaniz MD;  Location: [facility] ENDO ([address] [place] [address]);  Service: Endoscopy;  Laterality: N/A;     CORONARY ANGIOPLASTY      CYSTOGRAM N/A 12/11/2019    Procedure: CYSTOGRAM INTRAOP;  Surgeon: Robin Boyd MD;  Location: [facility] OR 2ND FLR;  Service: Urology;  Laterality: N/A;  1 HOUR    CYSTOSCOPY W/ RETROGRADES Left 12/11/2019    Procedure: CYSTOSCOPY, WITH RETROGRADE PYELOGRAM;  Surgeon: Robin Boyd MD;  [facility];  Service: Urology;  Laterality: Left;  fluro    ENDOSCOPIC ULTRASOUND OF UPPER GASTROINTESTINAL TRACT N/A 6/15/2023    Procedure: ULTRASOUND, UPPER GI TRACT, ENDOSCOPIC;  Surgeon: Lisa Kim MD;  Location: [facility] ENDO (2ND FLR);  Service: Endoscopy;  Laterality: N/A;    ERCP N/A 6/15/2023    Procedure: ERCP (ENDOSCOPIC RETROGRADE CHOLANGIOPANCREATOGRAPHY);  Surgeon: Lisa Kim MD;  Location: [facility] ENDO (2ND FLR);  Service: Endoscopy;  Laterality: N/A;    ESOPHAGOGASTRODUODENOSCOPY W/ PEG  5/2/2019    Procedure: EGD, WITH PEG TUBE INSERTION;  Surgeon: Sean Ruano MD;  [facility];  Service: General;;    EXCISION TURBINATE, SUBMUCOUS      FLEXIBLE SIGMOIDOSCOPY N/A 5/13/2019    Procedure: SIGMOIDOSCOPY, FLEXIBLE;  Surgeon: ALBERTO Amin MD;  Location: [facility] ENDO (2ND FLR);  Service: Endoscopy;  Laterality: N/A;    FLEXIBLE SIGMOIDOSCOPY N/A 5/21/2019    Procedure: SIGMOIDOSCOPY, FLEXIBLE;  Surgeon: ALBERTO Amin MD;  [facility]  Service: Endoscopy;  Laterality: N/A;    FUSION OF LUMBAR SPINE BY ANTERIOR APPROACH Left 4/12/2019    Procedure: FUSION, SPINE, LUMBAR, ANTERIOR APPROACH Left L5-S1 Anterior to Psoa Interbody Fusion, L5-S1 Posterior Instrumentation;  Surgeon: Mk George MD;  [facility];  Service: Neurosurgery;  Laterality: Left;  Porcedure:  Left L5-S1 Anterior to Psoa Interbody Fusion, L5-S1 Posterior Instrumentation  Surgery Time: 4 Hrs  LOS: 2-3  Anesthesia: General   Blood: Type & Screen  R    HAND SURGERY Left     HAND SURGERY Right     torn ligament repair/ Dr. Yeboah    HYSTERECTOMY       INJECTION OF STEROID Right 12/10/2020    Procedure: INJECTION, STEROID Right SI Joint Block and Steroid Injection;  Surgeon: Mk George MD;  Location: [facility] OR;  Service: Neurosurgery;  Laterality: Right;  Procedure: Right SI Joint Block and Steroid Injection   SUrgery Time: 30 Min  LOS: 0  Anesthesia: MAC  Radiology: C-arm  Bed: Tomer 4 Poster  Position: Prone    INJECTION OF STEROID Right 9/28/2021    Procedure: INJECTION, STEROID Right SI joint block & steroid injection;  Surgeon: Mk George MD;  Location: [facility] OR;  Service: Neurosurgery;  Laterality: Right;  Procedure: Right SI joint block & steroid injection  Surgery Time: 30m  Anesthesia: Local MAC  Radiology: C-arm  Bed: Regular  Position: Prone    LAPAROSCOPIC CHOLECYSTECTOMY N/A 6/23/2023    Procedure: CHOLECYSTECTOMY, LAPAROSCOPIC;  Surgeon: Richard Penny MD;  Location: [address];  Service: General;  Laterality: N/A;    left foot surgery      left wrist surgery      LYSIS OF ADHESIONS N/A 2/19/2020    Procedure: LYSIS, ADHESIONS;  Surgeon: Robin Boyd MD;  Location: [address];  Service: Urology;  Laterality: N/A;    NASAL SEPTUM SURGERY  5/7/15    OPEN REDUCTION AND INTERNAL FIXATION (ORIF) OF FRACTURE OF PROXIMAL HUMERUS Left 7/12/2023    Procedure: ORIF, FRACTURE, HUMERUS, PROXIMAL ;  Surgeon: Tomasz Leary MD;  Location: [address];  Service: Orthopedics;  Laterality: Left;    PERCUTANEOUS NEPHROSTOMY Left 4/21/2019    Procedure: Creation, Nephrostomy, Percutaneous;  Surgeon: Karina Surgeon;  Location: [facility];  Service: Anesthesiology;  Laterality: Left;    REPAIR OF URETER  4/12/2019    Procedure: REPAIR, URETER;  Surgeon: Mk George MD;  Location: [address];  Service: Neurosurgery;;    REPLACEMENT OF NEPHROSTOMY TUBE N/A 7/18/2019    Procedure: REPLACEMENT, NEPHROSTOMY TUBE;  Surgeon: Destin Diagnostic Provider;  Location: [address];  Service: Anesthesiology;  Laterality: N/A;  188     REPLACEMENT OF NEPHROSTOMY TUBE N/A 7/24/2019    Procedure: REPLACEMENT, NEPHROSTOMY TUBE;  Surgeon: [facility] Diagnostic Provider;  Location: [facility] OR 2ND FLR;  Service: Anesthesiology;  Laterality: N/A;  188    REPLACEMENT OF NEPHROSTOMY TUBE N/A 10/7/2019    Procedure: REPLACEMENT, NEPHROSTOMY TUBE;  Surgeon: Dos Diagnostic Provider;  Location: [facility] OR 2ND FLR;  Service: Anesthesiology;  Laterality: N/A;  189    REPLACEMENT OF NEPHROSTOMY TUBE N/A 11/25/2019    Procedure: REPLACEMENT, NEPHROSTOMY TUBE;  Surgeon: [facility] Diagnostic Provider;  Location: [facility] OR [facility] FLR;  Service: Anesthesiology;  Laterality: N/A;  Room 188/Bessy    REPLACEMENT OF NEPHROSTOMY TUBE Right 2/19/2020    Procedure: REPLACEMENT, NEPHROSTOMY TUBE removal removal;  Surgeon: Robin Boyd MD;  [facility];  Service: Urology;  Laterality: Right;    rt elbow surgery      S/P LAD COATED STENT  05/14/2010    6 total     S/P OM1 STENT  08/2003    SINUS SURGERY      F.E.S.S.    TRACHEOSTOMY N/A 5/2/2019    Procedure: CREATION, TRACHEOSTOMY;  Surgeon: Sean Ruano MD;  [facility];  Service: General;  Laterality: N/A;    TUBAL LIGATION      URETERAL REIMPLANTION Left 2/19/2020    Procedure: REIMPLANTATION, URETER WITH PSOAS HITCH;  Surgeon: Robin Boyd MD;  [facility];  Service: Urology;  Laterality: Left;       Review of patient's allergies indicates:   Allergen Reactions    Milk containing products (dairy)      Causes GI distress       Current Facility-Administered Medications on File Prior to Encounter   Medication    0.9%  NaCl infusion    fentaNYL 50 mcg/mL injection  mcg    midazolam (VERSED) 1 mg/mL injection 0.5-4 mg     Current Outpatient Medications on File Prior to Encounter   Medication Sig    ACCU-CHEK EDIN PLUS METER Misc     acetaminophen (TYLENOL) 650 MG TbSR Take 1 tablet (650 mg total) by mouth every 8 (eight) hours.    albuterol (PROVENTIL/VENTOLIN HFA) 90 mcg/actuation inhaler  Inhale 2 puffs into the lungs every 6 (six) hours as needed for Wheezing.    amLODIPine (NORVASC) 10 MG tablet Take 1 tablet (10 mg total) by mouth once daily.    apixaban (ELIQUIS) 2.5 mg Tab Take 1 tablet (2.5 mg total) by mouth 2 (two) times daily.    aspirin 81 mg Tab Take 81 mg by mouth once daily. 1 Tablet Oral Every day    atorvastatin (LIPITOR) 40 MG tablet Take 1 tablet (40 mg total) by mouth every evening.    blood sugar diagnostic (ACCU-CHEK EDIN PLUS TEST STRP) Strp TEST BLOOD SUGARS 4 TIMES DAILY    celecoxib (CELEBREX) 200 MG capsule Take 1 capsule (200 mg total) by mouth once daily.    cilostazoL (PLETAL) 100 MG Tab Take 1 tablet (100 mg total) by mouth 2 (two) times daily.    dapagliflozin (FARXIGA) 10 mg tablet Take 1 tablet (10 mg total) by mouth once daily. (Patient not taking: Reported on 7/10/2023)    diclofenac sodium (VOLTAREN) 1 % Gel Apply 2 g topically 3 (three) times daily. Apply to the area of pain 2-3x per day or night as needed (Patient not taking: Reported on 7/10/2023)    EScitalopram oxalate (LEXAPRO) 10 MG tablet Take 1 tablet (10 mg total) by mouth once daily.    gabapentin (NEURONTIN) 300 MG capsule Take 1 capsule (300 mg) in the morning and 2 capsules (600 mg) in the evening (Patient not taking: Reported on 7/11/2023)    glipiZIDE (GLUCOTROL) 10 MG TR24 Take 1 tablet (10 mg total) by mouth daily with breakfast.    hydrALAZINE (APRESOLINE) 25 MG tablet Take 1 tablet (25 mg total) by mouth every 12 (twelve) hours.    insulin detemir U-100, Levemir, (LEVEMIR FLEXPEN) 100 unit/mL (3 mL) InPn pen Inject 14 Units into the skin every evening. (Patient taking differently: Inject into the skin every evening. Adjusted dose based on glucose)    isosorbide mononitrate (ISMO,MONOKET) 10 mg tablet Take 1 tablet (10 mg total) by mouth once daily.    metoprolol tartrate (LOPRESSOR) 50 MG tablet Take 50 mg by mouth 2 (two) times daily.    multivitamin (THERAGRAN) per tablet Take 1 tablet by  "mouth once daily.    oxyCODONE (ROXICODONE) 5 MG immediate release tablet Take 1 tablet (5 mg total) by mouth every 4 (four) hours as needed for Pain.    pen needle, diabetic (NOVOTWIST) 32 gauge x 1/5" Ndle Use 1 needle to inject insulin four times daily    pen needle, diabetic 32 gauge x 5/32" Ndle Use as directed     Family History       Problem Relation (Age of Onset)    Diabetes Mother, Father, Sister, Brother, Sister    Heart attack Father    Heart disease Mother    Leukemia Father    No Known Problems Sister, Brother, Brother, Maternal Grandmother, Maternal Grandfather, Paternal Grandmother, Paternal Grandfather, Son, Son, Maternal Aunt, Maternal Uncle, Paternal Aunt, Paternal Uncle          Tobacco Use    Smoking status: Never    Smokeless tobacco: Never   Substance and Sexual Activity    Alcohol use: No     Alcohol/week: 0.0 standard drinks of alcohol    Drug use: No    Sexual activity: Yes     Partners: Male     Birth control/protection: Post-menopausal     Comment:      Review of Systems   Constitutional:  Negative for chills and fever.   HENT:  Negative for congestion and sore throat.    Respiratory:  Negative for cough and shortness of breath.    Cardiovascular:  Negative for chest pain and palpitations.   Gastrointestinal:  Positive for abdominal pain (stinging, onset same time as vomiting) and vomiting (food particles, nonbloody). Negative for blood in stool, constipation and diarrhea.   Genitourinary:  Negative for dysuria and frequency.   Musculoskeletal:  Negative for arthralgias and myalgias.   Skin:  Negative for rash and wound.   Neurological:  Negative for dizziness and syncope.     Objective:     Vital Signs (Most Recent):  Temp: 97.1 °F (36.2 °C) (08/02/23 1436)  Pulse: 73 (08/02/23 2117)  Resp: 17 (08/02/23 2117)  BP: 119/71 (08/02/23 2117)  SpO2: 100 % (08/02/23 2117) Vital Signs (24h Range):  Temp:  [97.1 °F (36.2 °C)] 97.1 °F (36.2 °C)  Pulse:  [69-81] 73  Resp:  [16-20] 17  SpO2:  " [84 %-100 %] 100 %  BP: (106-140)/(56-82) 119/71     Weight: 63.5 kg (140 lb)  Body mass index is 24.03 kg/m².     Physical Exam  Vitals and nursing note reviewed.   Constitutional:       General: She is not in acute distress.     Appearance: She is ill-appearing. She is not toxic-appearing or diaphoretic.   Cardiovascular:      Rate and Rhythm: Normal rate and regular rhythm.   Pulmonary:      Effort: Pulmonary effort is normal. No respiratory distress.      Breath sounds: No wheezing.   Abdominal:      General: There is no distension.      Palpations: Abdomen is soft.      Tenderness: There is no guarding.      Comments: Nonacute abdominal exam   Musculoskeletal:      Right lower leg: No edema.      Left lower leg: No edema.   Skin:     General: Skin is warm and dry.   Neurological:      Mental Status: She is alert. Mental status is at baseline.      Cranial Nerves: No dysarthria.   Psychiatric:         Mood and Affect: Mood normal.         Behavior: Behavior normal.                Significant Labs: All pertinent labs within the past 24 hours have been reviewed.    Significant Imaging: I have reviewed all pertinent imaging results/findings within the past 24 hours.

## 2023-08-03 NOTE — PLAN OF CARE
AAOX4. Pt educated on fall risk, pt remained free from falls/trauma/injury. Denies chest pain, SOB, palpitations or dizziness. Plan of care reviewed with pt and spouse. Bed locked in lowest position, call bell within reach, no acute distress noted. Heparin infusing at 12 U/kg/hr. Latest trop was 14.519. MD Acosta notified, stat EKG done. No new orders. Compazine given for nausea. NPO from MN. Will continue to monitor.

## 2023-08-03 NOTE — HPI
Consult Reason: NSTEMI    63 y.o. female with CAD s/p GINGER to LAD in 2014 (also Lcx 95%, ramus 80%), bilateral lower extremity PAD (2022, 100% R NANCY, 100% R PTA, 100% L PTA, 100% L Peroneal; eliquis 2,5mg), HTN, HLD, DM2, MURTAZA admitted for multiple hepatic lesions c/f metastases vs infections. Patient feeling fatigue/nausea; Interventional cardiology consulted for troponins to 29k, and potential EF drop to 40% in ED. Potential WMA and LV thrombus which is pending formal echo evaluation. EKG shows lateral ST changes. Prior Cath in 2014 with LAD 99% lesion which was stented, lcx 95%, ramus 80%; no recent stress tests. Currently being treated with aspirin, brillinta, heparin gtt at 10PM. Agreeable to procedure.     Cardiac home meds: amlodipine, eliquis 2.5mg, aspirin, atorvastastin 40mg, cilostazol, imdur 10, metop 40mg  EF 40% per POC, Trops 29k, Cr 0.8, p lt 395, hgb 8.0, ,   Allergies: none to contrast  Access: No issues

## 2023-08-03 NOTE — NURSING
Nurses Note -- 4 Eyes      8/3/2023   5:49 PM      Skin assessed during: Admit      [x] No Altered Skin Integrity Present    []Prevention Measures Documented      [] Yes- Altered Skin Integrity Present or Discovered   [] LDA Added if Not in Epic (Describe Wound)   [] New Altered Skin Integrity was Present on Admit and Documented in LDA   [] Wound Image Taken    Wound Care Consulted? No    Attending Nurse:  Anabel Talbot RN/Staff Member:   Juan

## 2023-08-03 NOTE — ASSESSMENT & PLAN NOTE
Recent (6/2023) suspected biliary pancreatitis and biliary stricture treated with biliary sphincterotomy, biliary stent, and cholecystectomy.  CT A/P with new innumerable hepatic lesions:  1. Innumerable low attenuating hepatic lesions, new since the previous exam.  Malignancy remains a concern however given short-term development, correlation is needed to exclude multifocal infection/abscess in this patient status post bile duct stent placement and cholecystectomy.  Please note, there is a low attenuating focus within the gallbladder fossa, similar to the foci throughout the hepatic parenchyma..  2. Pancreatic ductal dilatation up to 4 mm, minimally increased compared to prior.  There is fullness of the pancreatic head, underlying mass is not excluded, correlation with recent ERCP advised.  3. Simple and complex bilateral renal cysts.  4. Biliary stent in place with expected pneumobilia.  5. Cholecystectomy with scattered fluid about the gallbladder fossa.  6. Moderate stool throughout the colon, may reflect developing constipation.  7.  Additional findings as above.       - concern for lesions seen on CT A/P  - hepatology consulted, appreciate assistance  - Zosyn  - F/u cultures  - serial abdominal exams  - consider MRI liver protocol depending hepatology recs

## 2023-08-03 NOTE — ASSESSMENT & PLAN NOTE
5/2023:   The estimated ejection fraction is 55%.   The quantitatively derived ejection fraction is 52%.   Normal right ventricular size with normal right ventricular systolic function.   Normal left ventricular diastolic function.   The left ventricle is normal in size with normal systolic function.   Normal central venous pressure (3 mmHg).     - appears dry to euvolemic on exam  - formal TTE ordered

## 2023-08-03 NOTE — NURSING
Nurses Note -- 4 Eyes      8/3/2023   12:17 AM      Skin assessed during: Admit      [x] No Altered Skin Integrity Present    [x]Prevention Measures Documented      [] Yes- Altered Skin Integrity Present or Discovered   [] LDA Added if Not in Epic (Describe Wound)   [] New Altered Skin Integrity was Present on Admit and Documented in LDA   [] Wound Image Taken    Wound Care Consulted? No    Attending Nurse:  Valencia Luna RN     Second RN/Staff Member:  Be sanchez

## 2023-08-03 NOTE — ASSESSMENT & PLAN NOTE
"Patient's FSGs are controlled on current medication regimen.  Last A1c reviewed-   Lab Results   Component Value Date    HGBA1C 8.8 (H) 07/10/2023     Most recent fingerstick glucose reviewed- No results for input(s): "POCTGLUCOSE" in the last 24 hours.  Current correctional scale  Low  Maintain anti-hyperglycemic dose as follows-   Antihyperglycemics (From admission, onward)    None          - takes Farxiga, glipizide, and insulin once daily at home  - LDSSI while inpatient, diabetic diet once no longer NPO  Hold Oral hypoglycemics while patient is in the hospital.  "

## 2023-08-03 NOTE — ASSESSMENT & PLAN NOTE
Patient with recent anemia to 7-8, normocytic, in setting of pancreatitis and hospitalization.  Likely anemia of chronic inflammation  Hgb of 5 was error in the ER; stat repeat was back in 7s    - check ferritin, B12 with AM labs  - monitor CBC closely  - consider goal Hgb 8 in setting of possible ACS

## 2023-08-03 NOTE — HPI
63F with CAD s/p stents 2003 and 2010, PAD, HFpEF, HTN, HLD, DM2, recent (6/2023) suspected biliary pancreatitis and biliary stricture treated with biliary sphincterotomy, biliary stent, and cholecystectomy, carotid stenosis, MURTAZA, asthma, history of DVT on Eliquis, who presented for abdominal pain and vomiting. Found to have NSTEMI, had LHC with Cardiology 8/3, no stents placed, plan to continue medical management. On workup of abd pain, CT AP w/ contrast shows innumerable low attenuating hepatic lesions which are new, PD dilation to 4mm, fulness in pancreatic head, b/l renal cysts. Hepatology consulted given new liver lesions seen on CT.     US RUQ 6/30/23 with small volume fluid at surgical bed of recent cholecystectomy, otherwise unremarkable.    EUS 6/15/23 with mild PD dilation, pancreatic body/tail cyst, pancreatic changes related to acute pancreatitis noted throughout the pancreas, small amount of fluid around the head, visualized portions of liver unremarkable. ERCP 6/15/2023 severe biliary stricture, sphincterotomy performed and plastic stent placed, brushings negative for malignancy.    's- has been elevated since mid June when she had pancreatitis episode, , Alt 26, t bili 0.7. Afebrile, no leukocytosis.

## 2023-08-03 NOTE — SUBJECTIVE & OBJECTIVE
Past Medical History:   Diagnosis Date    Anticoagulant long-term use     Asthma     Back pain     Bradycardia, unspecified 2019    The etiology of the bradycardic episode is unclear.  The have appear to be respiratory in origin (at least the 1st episode).  We will continue to monitor carefully.  We are awaiting evaluation by Cardiology.      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, with long-term current use of insulin 2020       Past Surgical History:   Procedure Laterality Date    ANTEGRADE NEPHROSTOGRAPHY Left 2019    Procedure: Nephrostogram - antegrade;  Surgeon: Robin Boyd MD;  Location: [facility] OR [address] [place] [address];  Service: Urology;  Laterality: Left;    BRONCHOSCOPY N/A 2019    Procedure: BRONCHOSCOPY;  Surgeon: Sean Ruano MD;  Location: [facility] OR [address] [place] [address];  Service: General;  Laterality: N/A;    CARDIAC CATHETERIZATION      CATARACT EXTRACTION      cataract extraction left eye      cataracts      CAUDAL EPIDURAL STEROID INJECTION N/A 2019    Procedure: Injection-steroid-epidural-caudal;  Surgeon: Dave Bentley Jr., MD;  Location: [facility] PAIN MGT;  Service: Pain Management;  Laterality: N/A;     SECTION, LOW TRANSVERSE      COLONOSCOPY N/A 2019    Procedure: COLONOSCOPY;  Surgeon: Al Alaniz MD;  Location: [facility] ENDO ([address] [place] [address]);  Service: Endoscopy;  Laterality: N/A;     CORONARY ANGIOPLASTY      CYSTOGRAM N/A 12/11/2019    Procedure: CYSTOGRAM INTRAOP;  Surgeon: Robin Boyd MD;  Location: [facility] OR 2ND FLR;  Service: Urology;  Laterality: N/A;  1 HOUR    CYSTOSCOPY W/ RETROGRADES Left 12/11/2019    Procedure: CYSTOSCOPY, WITH RETROGRADE PYELOGRAM;  Surgeon: Robin Boyd MD;  [facility];  Service: Urology;  Laterality: Left;  fluro    ENDOSCOPIC ULTRASOUND OF UPPER GASTROINTESTINAL TRACT N/A 6/15/2023    Procedure: ULTRASOUND, UPPER GI TRACT, ENDOSCOPIC;  Surgeon: Lisa Kim MD;  Location: [facility] ENDO (2ND FLR);  Service: Endoscopy;  Laterality: N/A;    ERCP N/A 6/15/2023    Procedure: ERCP (ENDOSCOPIC RETROGRADE CHOLANGIOPANCREATOGRAPHY);  Surgeon: Lisa Kim MD;  Location: [facility] ENDO (2ND FLR);  Service: Endoscopy;  Laterality: N/A;    ESOPHAGOGASTRODUODENOSCOPY W/ PEG  5/2/2019    Procedure: EGD, WITH PEG TUBE INSERTION;  Surgeon: Sean Ruano MD;  [facility];  Service: General;;    EXCISION TURBINATE, SUBMUCOUS      FLEXIBLE SIGMOIDOSCOPY N/A 5/13/2019    Procedure: SIGMOIDOSCOPY, FLEXIBLE;  Surgeon: ALBERTO Amin MD;  Location: [facility] ENDO (2ND FLR);  Service: Endoscopy;  Laterality: N/A;    FLEXIBLE SIGMOIDOSCOPY N/A 5/21/2019    Procedure: SIGMOIDOSCOPY, FLEXIBLE;  Surgeon: ALBERTO Amin MD;  [facility]  Service: Endoscopy;  Laterality: N/A;    FUSION OF LUMBAR SPINE BY ANTERIOR APPROACH Left 4/12/2019    Procedure: FUSION, SPINE, LUMBAR, ANTERIOR APPROACH Left L5-S1 Anterior to Psoa Interbody Fusion, L5-S1 Posterior Instrumentation;  Surgeon: Mk George MD;  [facility];  Service: Neurosurgery;  Laterality: Left;  Porcedure:  Left L5-S1 Anterior to Psoa Interbody Fusion, L5-S1 Posterior Instrumentation  Surgery Time: 4 Hrs  LOS: 2-3  Anesthesia: General   Blood: Type & Screen  R    HAND SURGERY Left     HAND SURGERY Right     torn ligament repair/ Dr. Yeboah    HYSTERECTOMY       INJECTION OF STEROID Right 12/10/2020    Procedure: INJECTION, STEROID Right SI Joint Block and Steroid Injection;  Surgeon: Mk George MD;  Location: [facility] OR;  Service: Neurosurgery;  Laterality: Right;  Procedure: Right SI Joint Block and Steroid Injection   SUrgery Time: 30 Min  LOS: 0  Anesthesia: MAC  Radiology: C-arm  Bed: Tomer 4 Poster  Position: Prone    INJECTION OF STEROID Right 9/28/2021    Procedure: INJECTION, STEROID Right SI joint block & steroid injection;  Surgeon: Mk George MD;  Location: [facility] OR;  Service: Neurosurgery;  Laterality: Right;  Procedure: Right SI joint block & steroid injection  Surgery Time: 30m  Anesthesia: Local MAC  Radiology: C-arm  Bed: Regular  Position: Prone    LAPAROSCOPIC CHOLECYSTECTOMY N/A 6/23/2023    Procedure: CHOLECYSTECTOMY, LAPAROSCOPIC;  Surgeon: Richard Penny MD;  Location: [address];  Service: General;  Laterality: N/A;    left foot surgery      left wrist surgery      LYSIS OF ADHESIONS N/A 2/19/2020    Procedure: LYSIS, ADHESIONS;  Surgeon: Robin Boyd MD;  Location: [address];  Service: Urology;  Laterality: N/A;    NASAL SEPTUM SURGERY  5/7/15    OPEN REDUCTION AND INTERNAL FIXATION (ORIF) OF FRACTURE OF PROXIMAL HUMERUS Left 7/12/2023    Procedure: ORIF, FRACTURE, HUMERUS, PROXIMAL ;  Surgeon: Tomasz Leary MD;  Location: [address];  Service: Orthopedics;  Laterality: Left;    PERCUTANEOUS NEPHROSTOMY Left 4/21/2019    Procedure: Creation, Nephrostomy, Percutaneous;  Surgeon: Karina Surgeon;  Location: [facility];  Service: Anesthesiology;  Laterality: Left;    REPAIR OF URETER  4/12/2019    Procedure: REPAIR, URETER;  Surgeon: Mk George MD;  Location: [address];  Service: Neurosurgery;;    REPLACEMENT OF NEPHROSTOMY TUBE N/A 7/18/2019    Procedure: REPLACEMENT, NEPHROSTOMY TUBE;  Surgeon: [facility] Diagnostic Provider;  Location: [address];  Service: Anesthesiology;  Laterality: N/A;  188     REPLACEMENT OF NEPHROSTOMY TUBE N/A 7/24/2019    Procedure: REPLACEMENT, NEPHROSTOMY TUBE;  Surgeon: [facility] Diagnostic Provider;  Location: [facility] OR 2ND FLR;  Service: Anesthesiology;  Laterality: N/A;  188    REPLACEMENT OF NEPHROSTOMY TUBE N/A 10/7/2019    Procedure: REPLACEMENT, NEPHROSTOMY TUBE;  Surgeon: Dos Diagnostic Provider;  Location: [facility] OR 2ND FLR;  Service: Anesthesiology;  Laterality: N/A;  189    REPLACEMENT OF NEPHROSTOMY TUBE N/A 11/25/2019    Procedure: REPLACEMENT, NEPHROSTOMY TUBE;  Surgeon: [facility] Diagnostic Provider;  Location: [facility] OR [facility] FLR;  Service: Anesthesiology;  Laterality: N/A;  Room 188/Bessy    REPLACEMENT OF NEPHROSTOMY TUBE Right 2/19/2020    Procedure: REPLACEMENT, NEPHROSTOMY TUBE removal removal;  Surgeon: Robin Boyd MD;  [facility];  Service: Urology;  Laterality: Right;    rt elbow surgery      S/P LAD COATED STENT  05/14/2010    6 total     S/P OM1 STENT  08/2003    SINUS SURGERY      F.E.S.S.    TRACHEOSTOMY N/A 5/2/2019    Procedure: CREATION, TRACHEOSTOMY;  Surgeon: Sean Ruano MD;  [facility];  Service: General;  Laterality: N/A;    TUBAL LIGATION      URETERAL REIMPLANTION Left 2/19/2020    Procedure: REIMPLANTATION, URETER WITH PSOAS HITCH;  Surgeon: Robin Boyd MD;  [facility];  Service: Urology;  Laterality: Left;       Review of patient's allergies indicates:   Allergen Reactions    Milk containing products (dairy)      Causes GI distress       Current Facility-Administered Medications on File Prior to Encounter   Medication    0.9%  NaCl infusion    fentaNYL 50 mcg/mL injection  mcg    midazolam (VERSED) 1 mg/mL injection 0.5-4 mg     Current Outpatient Medications on File Prior to Encounter   Medication Sig    ACCU-CHEK EDIN PLUS METER Misc     acetaminophen (TYLENOL) 650 MG TbSR Take 1 tablet (650 mg total) by mouth every 8 (eight) hours.    albuterol (PROVENTIL/VENTOLIN HFA) 90 mcg/actuation inhaler  Inhale 2 puffs into the lungs every 6 (six) hours as needed for Wheezing.    amLODIPine (NORVASC) 10 MG tablet Take 1 tablet (10 mg total) by mouth once daily.    apixaban (ELIQUIS) 2.5 mg Tab Take 1 tablet (2.5 mg total) by mouth 2 (two) times daily.    aspirin 81 mg Tab Take 81 mg by mouth once daily. 1 Tablet Oral Every day    atorvastatin (LIPITOR) 40 MG tablet Take 1 tablet (40 mg total) by mouth every evening.    blood sugar diagnostic (ACCU-CHEK EDIN PLUS TEST STRP) Strp TEST BLOOD SUGARS 4 TIMES DAILY    celecoxib (CELEBREX) 200 MG capsule Take 1 capsule (200 mg total) by mouth once daily.    cilostazoL (PLETAL) 100 MG Tab Take 1 tablet (100 mg total) by mouth 2 (two) times daily.    dapagliflozin (FARXIGA) 10 mg tablet Take 1 tablet (10 mg total) by mouth once daily. (Patient not taking: Reported on 7/10/2023)    diclofenac sodium (VOLTAREN) 1 % Gel Apply 2 g topically 3 (three) times daily. Apply to the area of pain 2-3x per day or night as needed (Patient not taking: Reported on 7/10/2023)    EScitalopram oxalate (LEXAPRO) 10 MG tablet Take 1 tablet (10 mg total) by mouth once daily.    gabapentin (NEURONTIN) 300 MG capsule Take 1 capsule (300 mg) in the morning and 2 capsules (600 mg) in the evening (Patient not taking: Reported on 7/11/2023)    glipiZIDE (GLUCOTROL) 10 MG TR24 Take 1 tablet (10 mg total) by mouth daily with breakfast.    hydrALAZINE (APRESOLINE) 25 MG tablet Take 1 tablet (25 mg total) by mouth every 12 (twelve) hours.    insulin detemir U-100, Levemir, (LEVEMIR FLEXPEN) 100 unit/mL (3 mL) InPn pen Inject 14 Units into the skin every evening. (Patient taking differently: Inject into the skin every evening. Adjusted dose based on glucose)    isosorbide mononitrate (ISMO,MONOKET) 10 mg tablet Take 1 tablet (10 mg total) by mouth once daily.    metoprolol tartrate (LOPRESSOR) 50 MG tablet Take 50 mg by mouth 2 (two) times daily.    multivitamin (THERAGRAN) per tablet Take 1 tablet by  "mouth once daily.    oxyCODONE (ROXICODONE) 5 MG immediate release tablet Take 1 tablet (5 mg total) by mouth every 4 (four) hours as needed for Pain.    pen needle, diabetic (NOVOTWIST) 32 gauge x 1/5" Ndle Use 1 needle to inject insulin four times daily    pen needle, diabetic 32 gauge x 5/32" Ndle Use as directed     Family History       Problem Relation (Age of Onset)    Diabetes Mother, Father, Sister, Brother, Sister    Heart attack Father    Heart disease Mother    Leukemia Father    No Known Problems Sister, Brother, Brother, Maternal Grandmother, Maternal Grandfather, Paternal Grandmother, Paternal Grandfather, Son, Son, Maternal Aunt, Maternal Uncle, Paternal Aunt, Paternal Uncle          Tobacco Use    Smoking status: Never    Smokeless tobacco: Never   Substance and Sexual Activity    Alcohol use: No     Alcohol/week: 0.0 standard drinks of alcohol    Drug use: No    Sexual activity: Yes     Partners: Male     Birth control/protection: Post-menopausal     Comment:      Review of Systems   Constitutional:  Positive for activity change, fatigue and unexpected weight change. Negative for chills and fever.   HENT:  Negative for congestion and sore throat.    Respiratory:  Negative for cough and shortness of breath.    Cardiovascular:  Negative for chest pain and palpitations.   Gastrointestinal:  Positive for abdominal pain (stinging, onset same time as vomiting) and vomiting (food particles, nonbloody). Negative for blood in stool, constipation and diarrhea.   Genitourinary:  Negative for dysuria and frequency.   Musculoskeletal:  Negative for arthralgias and myalgias.   Skin:  Negative for rash and wound.   Neurological:  Negative for dizziness and syncope.     Objective:     Vital Signs (Most Recent):  Temp: 97.1 °F (36.2 °C) (08/02/23 1436)  Pulse: 73 (08/02/23 2117)  Resp: 17 (08/02/23 2117)  BP: 119/71 (08/02/23 2117)  SpO2: 100 % (08/02/23 2117) Vital Signs (24h Range):  Temp:  [97.1 °F (36.2 " °C)] 97.1 °F (36.2 °C)  Pulse:  [69-81] 73  Resp:  [16-20] 17  SpO2:  [84 %-100 %] 100 %  BP: (106-140)/(56-82) 119/71     Weight: 63.5 kg (140 lb)  Body mass index is 24.03 kg/m².     Physical Exam  Vitals and nursing note reviewed.   Constitutional:       General: She is not in acute distress.     Appearance: She is ill-appearing. She is not toxic-appearing or diaphoretic.   Cardiovascular:      Rate and Rhythm: Normal rate and regular rhythm.   Pulmonary:      Effort: Pulmonary effort is normal. No respiratory distress.      Breath sounds: No wheezing.   Abdominal:      General: There is no distension.      Palpations: Abdomen is soft.      Tenderness: There is no guarding.      Comments: Nonacute abdominal exam   Musculoskeletal:      Right lower leg: No edema.      Left lower leg: No edema.   Skin:     General: Skin is warm and dry.   Neurological:      Mental Status: She is alert. Mental status is at baseline.      Cranial Nerves: No dysarthria.   Psychiatric:         Mood and Affect: Mood normal.         Behavior: Behavior normal.                Significant Labs: All pertinent labs within the past 24 hours have been reviewed.    Significant Imaging: I have reviewed all pertinent imaging results/findings within the past 24 hours.  Review of Systems   Constitutional: Positive for activity change, fatigue and unexpected weight change. Negative for chills and fever.   HENT:  Negative for congestion and sore throat.    Cardiovascular:  Negative for chest pain and palpitations.   Respiratory:  Negative for cough and shortness of breath.    Skin:  Negative for rash and wound.   Musculoskeletal:  Negative for arthralgias and myalgias.   Gastrointestinal:  Positive for abdominal pain (stinging, onset same time as vomiting) and vomiting (food particles, nonbloody). Negative for blood in stool, constipation and diarrhea.   Genitourinary:  Negative for dysuria and frequency.   Neurological:  Negative for dizziness and  syncope.     Physical Exam  Vitals and nursing note reviewed.   Constitutional:       General: She is not in acute distress.     Appearance: She is ill-appearing. She is not toxic-appearing or diaphoretic.   Cardiovascular:      Rate and Rhythm: Normal rate and regular rhythm.   Pulmonary:      Effort: Pulmonary effort is normal. No respiratory distress.      Breath sounds: No wheezing.   Abdominal:      General: There is no distension.      Palpations: Abdomen is soft.      Tenderness: There is no guarding.      Comments: Nonacute abdominal exam   Musculoskeletal:      Right lower leg: No edema.      Left lower leg: No edema.   Skin:     General: Skin is warm and dry.   Neurological:      Mental Status: She is alert. Mental status is at baseline.      Cranial Nerves: No dysarthria.   Psychiatric:         Mood and Affect: Mood normal.         Behavior: Behavior normal.

## 2023-08-03 NOTE — ASSESSMENT & PLAN NOTE
Patient with recent (6/2023) suspected biliary pancreatitis and biliary stricture treated with biliary sphincterotomy, biliary stent, and cholecystectomy.   She had returned to baseline with minimal symptoms prior to onset of vomiting yesterday.  CT A/P upon admission showing new innumerable hepatic lesions:  1. Innumerable low attenuating hepatic lesions, new since the previous exam.  Malignancy remains a concern however given short-term development, correlation is needed to exclude multifocal infection/abscess in this patient status post bile duct stent placement and cholecystectomy.  Please note, there is a low attenuating focus within the gallbladder fossa, similar to the foci throughout the hepatic parenchyma..  2. Pancreatic ductal dilatation up to 4 mm, minimally increased compared to prior.  There is fullness of the pancreatic head, underlying mass is not excluded, correlation with recent ERCP advised.  3. Simple and complex bilateral renal cysts.  4. Biliary stent in place with expected pneumobilia.  5. Cholecystectomy with scattered fluid about the gallbladder fossa.  6. Moderate stool throughout the colon, may reflect developing constipation.  7.  Additional findings as above.    - given patient's new stinging abdominal pain, vomiting, and possibility of septic emboli to liver, start zosyn  - f/u blood cultures  - serial abdominal exams  - consider GI consult in AM to weight in on hepatic lesions; consider MRI liver protocol to further characterize lesions

## 2023-08-03 NOTE — CONSULTS
[facility]  Hepatology  Consult Note    Patient Name: Mariann Huff  MRN: [id number]  Admission Date: 8/2/2023  Hospital Length of Stay: 1 days  Attending Provider: Aime George MD   Primary Care Physician: Jasbir Haney MD  Principal Problem:NSTEMI (non-ST elevated myocardial infarction)    Inpatient consult to Hepatology  Consult performed by: Chelsey Russell,   Consult ordered by: Ayala Schofield MD        Subjective:         HPI:    63F with CAD s/p stents 2003 and 2010, PAD, HFpEF, HTN, HLD, DM2, recent (6/2023) suspected biliary pancreatitis and biliary stricture treated with biliary sphincterotomy, biliary stent, and cholecystectomy, carotid stenosis, MURTAZA, asthma, history of DVT on Eliquis, who presented for abdominal pain and vomiting. Found to have NSTEMI, had LHC with Cardiology 8/3, no stents placed, plan to continue medical management. On workup of abd pain, CT AP w/ contrast shows innumerable low attenuating hepatic lesions which are new, PD dilation to 4mm, fulness in pancreatic head, b/l renal cysts. Hepatology consulted given new liver lesions seen on CT.     Pt reports she has been feeling well since discharge from prior hospitalization. Pt reports that she has had 40 pound unintentional weight loss in the last 2-3 months, also reports decreased appetite and early satiety last 2-3 months. Pt denies family hx of any cancers. Reports she has solid brown bowel movement daily.    US RUQ 6/30/23 with small volume fluid at surgical bed of recent cholecystectomy, otherwise unremarkable.    EUS 6/15/23 with mild PD dilation, pancreatic body/tail cyst, pancreatic changes related to acute pancreatitis noted throughout the pancreas, small amount of fluid around the head, visualized portions of liver unremarkable. ERCP 6/15/2023 severe biliary stricture, sphincterotomy performed and plastic stent placed, brushings negative for malignancy.    's- has been elevated since mid  June when she had pancreatitis episode, , Alt 26, t bili 0.7. Afebrile, no leukocytosis.      Review of Systems   Constitutional:  Positive for appetite change and unexpected weight change (40 pound wieght loss in 2 months). Negative for fever.   Respiratory:  Negative for cough and wheezing.    Cardiovascular:  Negative for leg swelling.   Gastrointestinal:  Positive for abdominal pain, nausea and vomiting. Negative for abdominal distention, blood in stool, constipation and diarrhea.   Genitourinary:  Negative for vaginal bleeding.   Musculoskeletal:  Negative for back pain.   All other systems reviewed and are negative.      Past Medical History:   Diagnosis Date    Anticoagulant long-term use     Asthma     Back pain     Bradycardia, unspecified 05/08/2019    The etiology of the bradycardic episode is unclear.  The have appear to be respiratory in origin (at least the 1st episode).  We will continue to monitor carefully.  We are awaiting evaluation by Cardiology.     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, with long-term current use of insulin 01/09/2020       Past Surgical History:   Procedure Laterality Date    ANTEGRADE NEPHROSTOGRAPHY Left 12/11/2019    Procedure: Nephrostogram - antegrade;  Surgeon: Robin Boyd MD;  Location: [facility] OR [address] [place] [address];  Service: Urology;  Laterality: Left;     BRONCHOSCOPY N/A 2019    Procedure: BRONCHOSCOPY;  Surgeon: Sean Ruano MD;  [facility];  Service: General;  Laterality: N/A;    CARDIAC CATHETERIZATION      CATARACT EXTRACTION      cataract extraction left eye      cataracts      CAUDAL EPIDURAL STEROID INJECTION N/A 2019    Procedure: Injection-steroid-epidural-caudal;  Surgeon: Dave Bentley Jr., MD;  Location: [facility] PAIN MGT;  Service: Pain Management;  Laterality: N/A;     SECTION, LOW TRANSVERSE      COLONOSCOPY N/A 2019    Procedure: COLONOSCOPY;  Surgeon: Al Alaniz MD;  Location: [facility] ENDO (2ND FLR);  Service: Endoscopy;  Laterality: N/A;    CORONARY ANGIOPLASTY      CYSTOGRAM N/A 2019    Procedure: CYSTOGRAM INTRAOP;  Surgeon: Robin Boyd MD;  [facility];  Service: Urology;  Laterality: N/A;  1 HOUR    CYSTOSCOPY W/ RETROGRADES Left 2019    Procedure: CYSTOSCOPY, WITH RETROGRADE PYELOGRAM;  Surgeon: Robin Boyd MD;  [facility];  Service: Urology;  Laterality: Left;  fluro    ENDOSCOPIC ULTRASOUND OF UPPER GASTROINTESTINAL TRACT N/A 6/15/2023    Procedure: ULTRASOUND, UPPER GI TRACT, ENDOSCOPIC;  Surgeon: Lisa Kim MD;  [facility]  Service: Endoscopy;  Laterality: N/A;    ERCP N/A 6/15/2023    Procedure: ERCP (ENDOSCOPIC RETROGRADE CHOLANGIOPANCREATOGRAPHY);  Surgeon: Lisa Kim MD;  [facility]  Service: Endoscopy;  Laterality: N/A;    ESOPHAGOGASTRODUODENOSCOPY W/ PEG  2019    Procedure: EGD, WITH PEG TUBE INSERTION;  Surgeon: Sean Ruano MD;  [facility];  Service: General;;    EXCISION TURBINATE, SUBMUCOUS      FLEXIBLE SIGMOIDOSCOPY N/A 2019    Procedure: SIGMOIDOSCOPY, FLEXIBLE;  Surgeon: ALBERTO Amin MD;  Location: [facility] ENDO (2ND FLR);  Service: Endoscopy;  Laterality: N/A;    FLEXIBLE SIGMOIDOSCOPY N/A 2019    Procedure: SIGMOIDOSCOPY, FLEXIBLE;   Surgeon: ALBERTO Amin MD;  Location: [facility] ENDO (2ND FLR);  Service: Endoscopy;  Laterality: N/A;    FUSION OF LUMBAR SPINE BY ANTERIOR APPROACH Left 4/12/2019    Procedure: FUSION, SPINE, LUMBAR, ANTERIOR APPROACH Left L5-S1 Anterior to Psoa Interbody Fusion, L5-S1 Posterior Instrumentation;  Surgeon: Mk George MD;  [facility];  Service: Neurosurgery;  Laterality: Left;  Porcedure:  Left L5-S1 Anterior to Psoa Interbody Fusion, L5-S1 Posterior Instrumentation  Surgery Time: 4 Hrs  LOS: 2-3  Anesthesia: General   Blood: Type & Screen  R    HAND SURGERY Left     HAND SURGERY Right     torn ligament repair/ Dr. Yeboah    HYSTERECTOMY      INJECTION OF STEROID Right 12/10/2020    Procedure: INJECTION, STEROID Right SI Joint Block and Steroid Injection;  Surgeon: Mk George MD;  Location: [facility];  Service: Neurosurgery;  Laterality: Right;  Procedure: Right SI Joint Block and Steroid Injection   SUrgery Time: 30 Min  LOS: 0  Anesthesia: MAC  Radiology: C-arm  Bed: [place] [id number] Poster  Position: Prone    INJECTION OF STEROID Right 9/28/2021    Procedure: INJECTION, STEROID Right SI joint block & steroid injection;  Surgeon: Mk George MD;  Location: [facility];  Service: Neurosurgery;  Laterality: Right;  Procedure: Right SI joint block & steroid injection  Surgery Time: 30m  Anesthesia: Local MAC  Radiology: C-arm  Bed: Regular  Position: Prone    LAPAROSCOPIC CHOLECYSTECTOMY N/A 6/23/2023    Procedure: CHOLECYSTECTOMY, LAPAROSCOPIC;  Surgeon: Richard Penny MD;  [facility];  Service: General;  Laterality: N/A;    left foot surgery      left wrist surgery      LYSIS OF ADHESIONS N/A 2/19/2020    Procedure: LYSIS, ADHESIONS;  Surgeon: Robin Boyd MD;  [facility];  Service: Urology;  Laterality: N/A;    NASAL SEPTUM SURGERY  5/7/15    OPEN REDUCTION AND INTERNAL FIXATION (ORIF) OF FRACTURE OF PROXIMAL HUMERUS Left 7/12/2023    Procedure: ORIF,  FRACTURE, HUMERUS, PROXIMAL ;  Surgeon: Tomasz Leary MD;  Location: [address];  Service: Orthopedics;  Laterality: Left;    PERCUTANEOUS NEPHROSTOMY Left 4/21/2019    Procedure: Creation, Nephrostomy, Percutaneous;  Surgeon: Karina Surgeon;  Location: [facility];  Service: Anesthesiology;  Laterality: Left;    REPAIR OF URETER  4/12/2019    Procedure: REPAIR, URETER;  Surgeon: Mk George MD;  Location: [address];  Service: Neurosurgery;;    REPLACEMENT OF NEPHROSTOMY TUBE N/A 7/18/2019    Procedure: REPLACEMENT, NEPHROSTOMY TUBE;  Surgeon: [facility] Provider;  [address]  Service: Anesthesiology;  Laterality: N/A;  188    REPLACEMENT OF NEPHROSTOMY TUBE N/A 7/24/2019    Procedure: REPLACEMENT, NEPHROSTOMY TUBE;  Surgeon: [facility] Diagnostic Provider;  [address]  Service: Anesthesiology;  Laterality: N/A;  188    REPLACEMENT OF NEPHROSTOMY TUBE N/A 10/7/2019    Procedure: REPLACEMENT, NEPHROSTOMY TUBE;  Surgeon: [facility] Diagnostic Provider;  [address]  Service: Anesthesiology;  Laterality: N/A;  189    REPLACEMENT OF NEPHROSTOMY TUBE N/A 11/25/2019    Procedure: REPLACEMENT, NEPHROSTOMY TUBE;  Surgeon: [facility] Diagnostic Provider;  Location: [address];  Service: Anesthesiology;  Laterality: N/A;  Room 188/Bessy    REPLACEMENT OF NEPHROSTOMY TUBE Right 2/19/2020    Procedure: REPLACEMENT, NEPHROSTOMY TUBE removal removal;  Surgeon: Robin Boyd MD;  Location: [address];  Service: Urology;  Laterality: Right;    rt elbow surgery      S/P LAD COATED STENT  05/14/2010    6 total     S/P OM1 STENT  08/2003    SINUS SURGERY      F.E.S.S.    TRACHEOSTOMY N/A 5/2/2019    Procedure: CREATION, TRACHEOSTOMY;  Surgeon: Sean Ruano MD;  Location: [address];  Service: General;  Laterality: N/A;    TUBAL LIGATION      URETERAL REIMPLANTION Left 2/19/2020    Procedure: REIMPLANTATION, URETER WITH PSOAS HITCH;  Surgeon:  Robin Boyd MD;  Location: [facility] OR [address] [place] [address];  Service: Urology;  Laterality: Left;       Family history of liver disease: No    Review of patient's allergies indicates:   Allergen Reactions    Milk containing products (dairy)      Causes GI distress         Tobacco Use    Smoking status: Never    Smokeless tobacco: Never   Substance and Sexual Activity    Alcohol use: No     Alcohol/week: 0.0 standard drinks of alcohol    Drug use: No    Sexual activity: Yes     Partners: Male     Birth control/protection: Post-menopausal     Comment:        Medications Prior to Admission   Medication Sig Dispense Refill Last Dose    ACCU-CHEK EDIN PLUS METER Misc        acetaminophen (TYLENOL) 650 MG TbSR Take 1 tablet (650 mg total) by mouth every 8 (eight) hours. 50 tablet 0     albuterol (PROVENTIL/VENTOLIN HFA) 90 mcg/actuation inhaler Inhale 2 puffs into the lungs every 6 (six) hours as needed for Wheezing. 1 g 3     amLODIPine (NORVASC) 10 MG tablet Take 1 tablet (10 mg total) by mouth once daily. 90 tablet 2     apixaban (ELIQUIS) 2.5 mg Tab Take 1 tablet (2.5 mg total) by mouth 2 (two) times daily. 180 tablet 1     aspirin 81 mg Tab Take 81 mg by mouth once daily. 1 Tablet Oral Every day       atorvastatin (LIPITOR) 40 MG tablet Take 1 tablet (40 mg total) by mouth every evening. 90 tablet 3     blood sugar diagnostic (ACCU-CHEK EDIN PLUS TEST STRP) Strp TEST BLOOD SUGARS 4 TIMES DAILY 150 strip 11     celecoxib (CELEBREX) 200 MG capsule Take 1 capsule (200 mg total) by mouth once daily. 10 capsule 0     cilostazoL (PLETAL) 100 MG Tab Take 1 tablet (100 mg total) by mouth 2 (two) times daily. 60 tablet 11     dapagliflozin (FARXIGA) 10 mg tablet Take 1 tablet (10 mg total) by mouth once daily. (Patient not taking: Reported on 7/10/2023) 90 tablet 3     diclofenac sodium (VOLTAREN) 1 % Gel Apply 2 g topically 3 (three) times daily. Apply to the area of pain 2-3x per day or night as needed  "(Patient not taking: Reported on 7/10/2023) 100 g 3     EScitalopram oxalate (LEXAPRO) 10 MG tablet Take 1 tablet (10 mg total) by mouth once daily. 90 tablet 2     gabapentin (NEURONTIN) 300 MG capsule Take 1 capsule (300 mg) in the morning and 2 capsules (600 mg) in the evening (Patient not taking: Reported on 7/11/2023) 90 capsule 0     glipiZIDE (GLUCOTROL) 10 MG TR24 Take 1 tablet (10 mg total) by mouth daily with breakfast. 90 tablet 3     hydrALAZINE (APRESOLINE) 25 MG tablet Take 1 tablet (25 mg total) by mouth every 12 (twelve) hours. 60 tablet 2     insulin detemir U-100, Levemir, (LEVEMIR FLEXPEN) 100 unit/mL (3 mL) InPn pen Inject 14 Units into the skin every evening. (Patient taking differently: Inject into the skin every evening. Adjusted dose based on glucose) 12 mL 3     isosorbide mononitrate (ISMO,MONOKET) 10 mg tablet Take 1 tablet (10 mg total) by mouth once daily. 90 tablet 2     metoprolol tartrate (LOPRESSOR) 50 MG tablet Take 50 mg by mouth 2 (two) times daily.       multivitamin (THERAGRAN) per tablet Take 1 tablet by mouth once daily.       oxyCODONE (ROXICODONE) 5 MG immediate release tablet Take 1 tablet (5 mg total) by mouth every 4 (four) hours as needed for Pain. 42 tablet 0     pen needle, diabetic (NOVOTWIST) 32 gauge x 1/5" Ndle Use 1 needle to inject insulin four times daily 400 each 2     pen needle, diabetic 32 gauge x 5/32" Ndle Use as directed 100 each 0        Objective:     Vital Signs (Most Recent):  Temp: 96.8 °F (36 °C) (08/03/23 0736)  Pulse: 78 (08/03/23 1100)  Resp: (!) 22 (08/03/23 1100)  BP: 121/65 (08/03/23 1100)  SpO2: 100 % (08/03/23 1100) Vital Signs (24h Range):  Temp:  [96.8 °F (36 °C)-98 °F (36.7 °C)] 96.8 °F (36 °C)  Pulse:  [] 78  Resp:  [16-22] 22  SpO2:  [84 %-100 %] 100 %  BP: ()/(43-82) 121/65     Weight: 58.4 kg (128 lb 12 oz) (08/02/23 2334)  Body mass index is 22.1 kg/m².       Physical Exam  Vitals and nursing note reviewed. "   Constitutional:       Appearance: She is not ill-appearing or toxic-appearing.   HENT:      Head: Normocephalic and atraumatic.   Eyes:      General:         Right eye: No discharge.         Left eye: No discharge.      Extraocular Movements: Extraocular movements intact.   Cardiovascular:      Rate and Rhythm: Normal rate.   Pulmonary:      Effort: Respiratory distress present.   Abdominal:      General: There is no distension.      Palpations: Abdomen is soft.      Tenderness: There is abdominal tenderness. There is no guarding or rebound.   Skin:     General: Skin is warm and dry.      Coloration: Skin is not jaundiced.   Neurological:      General: No focal deficit present.      Mental Status: She is alert.            MELD 3.0: 12 at 8/3/2023  4:03 AM  MELD-Na: 8 at 8/3/2023  4:03 AM  Calculated from:  Serum Creatinine: 0.8 mg/dL (Using min of 1 mg/dL) at 8/3/2023  4:03 AM  Serum Sodium: 140 mmol/L (Using max of 137 mmol/L) at 8/3/2023  4:03 AM  Total Bilirubin: 0.7 mg/dL (Using min of 1 mg/dL) at 8/3/2023  4:03 AM  Serum Albumin: 2.1 g/dL at 8/3/2023  4:03 AM  INR(ratio): 1.2 at 8/2/2023  8:42 PM  Age at listing (hypothetical): 63 years  Sex: Female at 8/3/2023  4:03 AM      Significant Labs:  Labs within the past month have been reviewed.    Significant Imaging:  CT imaging reviewed    Assessment/Plan:     64 y/o F with hx of HFpEF, CAD, DM, biliary pancreatitis s/p sphincterotomy, biliary stent, cholecystectomy in 06/2023 presented with abdominal pain associated with vomiting, found to have NSTEMI, had LHC which showed LCx thrombus. CT imaging showing innumerable liver lesions.Pt has been having unintentional weight loss and early satiety.      Innumerable liver lesions on CT  Pancreatic head prominence on CT  S/p biliary stent   Unintentional weight loss  Early Satiety    - Recommend CT abdomen with pancreatic protocol for further evaluation of pancreatic head prominence  - Recommend to check tumor markers  (CA 19-9, CEA, AFP)  - Further management pending results of above    Pt seen, examined, discussed with Dr. Ny  Thank you for your consult. I will follow-up with patient. Please contact us if you have any additional questions.    Chelsey Russell, DO  Hepatology  [facility]

## 2023-08-03 NOTE — SUBJECTIVE & OBJECTIVE
Review of Systems   Constitutional:  Positive for appetite change and unexpected weight change (40 pound wieght loss in 2 months). Negative for fever.   Respiratory:  Negative for cough and wheezing.    Cardiovascular:  Negative for leg swelling.   Gastrointestinal:  Positive for abdominal pain, nausea and vomiting. Negative for abdominal distention, blood in stool, constipation and diarrhea.   Genitourinary:  Negative for vaginal bleeding.   Musculoskeletal:  Negative for back pain.   All other systems reviewed and are negative.      Past Medical History:   Diagnosis Date    Anticoagulant long-term use     Asthma     Back pain     Bradycardia, unspecified 05/08/2019    The etiology of the bradycardic episode is unclear.  The have appear to be respiratory in origin (at least the 1st episode).  We will continue to monitor carefully.  We are awaiting evaluation by Cardiology.      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, with long-term current use of insulin 01/09/2020       Past Surgical History:   Procedure Laterality Date    ANTEGRADE NEPHROSTOGRAPHY Left 12/11/2019    Procedure: Nephrostogram - antegrade;  Surgeon: Robin Boyd MD;  Location: [facility] OR [address] [place] [address];  Service: Urology;  Laterality: Left;    BRONCHOSCOPY N/A 5/2/2019    Procedure: BRONCHOSCOPY;  Surgeon: Sean Ruano MD;  Location: [facility] OR [address] [place] [address];  Service:  General;  Laterality: N/A;    CARDIAC CATHETERIZATION      CATARACT EXTRACTION      cataract extraction left eye      cataracts      CAUDAL EPIDURAL STEROID INJECTION N/A 2019    Procedure: Injection-steroid-epidural-caudal;  Surgeon: Dave Bentley Jr., MD;  Location: [facility] PAIN MGT;  Service: Pain Management;  Laterality: N/A;     SECTION, LOW TRANSVERSE      COLONOSCOPY N/A 2019    Procedure: COLONOSCOPY;  Surgeon: Al Alaniz MD;  Location: [facility] (2ND FLR);  Service: Endoscopy;  Laterality: N/A;    CORONARY ANGIOPLASTY      CYSTOGRAM N/A 2019    Procedure: CYSTOGRAM INTRAOP;  Surgeon: Robin Boyd MD;  [facility];  Service: Urology;  Laterality: N/A;  1 HOUR    CYSTOSCOPY W/ RETROGRADES Left 2019    Procedure: CYSTOSCOPY, WITH RETROGRADE PYELOGRAM;  Surgeon: Robin Boyd MD;  [facility];  Service: Urology;  Laterality: Left;  fluro    ENDOSCOPIC ULTRASOUND OF UPPER GASTROINTESTINAL TRACT N/A 6/15/2023    Procedure: ULTRASOUND, UPPER GI TRACT, ENDOSCOPIC;  Surgeon: Lisa Kim MD;  [facility]  Service: Endoscopy;  Laterality: N/A;    ERCP N/A 6/15/2023    Procedure: ERCP (ENDOSCOPIC RETROGRADE CHOLANGIOPANCREATOGRAPHY);  Surgeon: Lisa Kim MD;  [facility]  Service: Endoscopy;  Laterality: N/A;    ESOPHAGOGASTRODUODENOSCOPY W/ PEG  2019    Procedure: EGD, WITH PEG TUBE INSERTION;  Surgeon: Sean Ruano MD;  [facility];  Service: General;;    EXCISION TURBINATE, SUBMUCOUS      FLEXIBLE SIGMOIDOSCOPY N/A 2019    Procedure: SIGMOIDOSCOPY, FLEXIBLE;  Surgeon: ALBERTO Amin MD;  Location: [facility] ENDO (2ND FLR);  Service: Endoscopy;  Laterality: N/A;    FLEXIBLE SIGMOIDOSCOPY N/A 2019    Procedure: SIGMOIDOSCOPY, FLEXIBLE;  Surgeon: ALBERTO Amin MD;  [facility]  Service: Endoscopy;  Laterality: N/A;    FUSION OF LUMBAR SPINE BY ANTERIOR  APPROACH Left 4/12/2019    Procedure: FUSION, SPINE, LUMBAR, ANTERIOR APPROACH Left L5-S1 Anterior to Psoa Interbody Fusion, L5-S1 Posterior Instrumentation;  Surgeon: Mk George MD;  Location: [address];  Service: Neurosurgery;  Laterality: Left;  Porcedure:  Left L5-S1 Anterior to Psoa Interbody Fusion, L5-S1 Posterior Instrumentation  Surgery Time: 4 Hrs  LOS: 2-3  Anesthesia: General   Blood: Type & Screen  R    HAND SURGERY Left     HAND SURGERY Right     torn ligament repair/ Dr. Yeboah    HYSTERECTOMY      INJECTION OF STEROID Right 12/10/2020    Procedure: INJECTION, STEROID Right SI Joint Block and Steroid Injection;  Surgeon: Mk George MD;  Location: [facility];  Service: Neurosurgery;  Laterality: Right;  Procedure: Right SI Joint Block and Steroid Injection   SUrgery Time: 30 Min  LOS: 0  Anesthesia: MAC  Radiology: C-arm  Bed: Rittman 4 Poster  Position: Prone    INJECTION OF STEROID Right 9/28/2021    Procedure: INJECTION, STEROID Right SI joint block & steroid injection;  Surgeon: Mk George MD;  Location: [facility];  Service: Neurosurgery;  Laterality: Right;  Procedure: Right SI joint block & steroid injection  Surgery Time: 30m  Anesthesia: Local MAC  Radiology: C-arm  Bed: Regular  Position: Prone    LAPAROSCOPIC CHOLECYSTECTOMY N/A 6/23/2023    Procedure: CHOLECYSTECTOMY, LAPAROSCOPIC;  Surgeon: Richard Penny MD;  Location: [address];  Service: General;  Laterality: N/A;    left foot surgery      left wrist surgery      LYSIS OF ADHESIONS N/A 2/19/2020    Procedure: LYSIS, ADHESIONS;  Surgeon: Robin Boyd MD;  Location: [address];  Service: Urology;  Laterality: N/A;    NASAL SEPTUM SURGERY  5/7/15    OPEN REDUCTION AND INTERNAL FIXATION (ORIF) OF FRACTURE OF PROXIMAL HUMERUS Left 7/12/2023    Procedure: ORIF, FRACTURE, HUMERUS, PROXIMAL ;  Surgeon: Tomasz Leary MD;  Location: [address];  Service: Orthopedics;  Laterality: Left;     PERCUTANEOUS NEPHROSTOMY Left 4/21/2019    Procedure: Creation, Nephrostomy, Percutaneous;  Surgeon: Karina Surgeon;  Location: [facility];  Service: Anesthesiology;  Laterality: Left;    REPAIR OF URETER  4/12/2019    Procedure: REPAIR, URETER;  Surgeon: Mk George MD;  [facility];  Service: Neurosurgery;;    REPLACEMENT OF NEPHROSTOMY TUBE N/A 7/18/2019    Procedure: REPLACEMENT, NEPHROSTOMY TUBE;  Surgeon: [facility];  [facility];  Service: Anesthesiology;  Laterality: N/A;  188    REPLACEMENT OF NEPHROSTOMY TUBE N/A 7/24/2019    Procedure: REPLACEMENT, NEPHROSTOMY TUBE;  Surgeon: [facility] Diagnostic Provider;  [facility];  Service: Anesthesiology;  Laterality: N/A;  188    REPLACEMENT OF NEPHROSTOMY TUBE N/A 10/7/2019    Procedure: REPLACEMENT, NEPHROSTOMY TUBE;  Surgeon: [facility] Diagnostic Provider;  [facility];  Service: Anesthesiology;  Laterality: N/A;  189    REPLACEMENT OF NEPHROSTOMY TUBE N/A 11/25/2019    Procedure: REPLACEMENT, NEPHROSTOMY TUBE;  Surgeon: [facility] Diagnostic Provider;  [facility];  Service: Anesthesiology;  Laterality: N/A;  Room 188/Bessy    REPLACEMENT OF NEPHROSTOMY TUBE Right 2/19/2020    Procedure: REPLACEMENT, NEPHROSTOMY TUBE removal removal;  Surgeon: Robin Boyd MD;  Location: [address];  Service: Urology;  Laterality: Right;    rt elbow surgery      S/P LAD COATED STENT  05/14/2010    6 total     S/P OM1 STENT  08/2003    SINUS SURGERY      F.E.S.S.    TRACHEOSTOMY N/A 5/2/2019    Procedure: CREATION, TRACHEOSTOMY;  Surgeon: Sean Ruano MD;  [facility];  Service: General;  Laterality: N/A;    TUBAL LIGATION      URETERAL REIMPLANTION Left 2/19/2020    Procedure: REIMPLANTATION, URETER WITH PSOAS HITCH;  Surgeon: Robin Boyd MD;  Location: [address];  Service: Urology;  Laterality: Left;       Family history of liver disease: No    Review of patient's allergies  indicates:   Allergen Reactions    Milk containing products (dairy)      Causes GI distress         Tobacco Use    Smoking status: Never    Smokeless tobacco: Never   Substance and Sexual Activity    Alcohol use: No     Alcohol/week: 0.0 standard drinks of alcohol    Drug use: No    Sexual activity: Yes     Partners: Male     Birth control/protection: Post-menopausal     Comment:        Medications Prior to Admission   Medication Sig Dispense Refill Last Dose    ACCU-CHEK EDIN PLUS METER Misc        acetaminophen (TYLENOL) 650 MG TbSR Take 1 tablet (650 mg total) by mouth every 8 (eight) hours. 50 tablet 0     albuterol (PROVENTIL/VENTOLIN HFA) 90 mcg/actuation inhaler Inhale 2 puffs into the lungs every 6 (six) hours as needed for Wheezing. 1 g 3     amLODIPine (NORVASC) 10 MG tablet Take 1 tablet (10 mg total) by mouth once daily. 90 tablet 2     apixaban (ELIQUIS) 2.5 mg Tab Take 1 tablet (2.5 mg total) by mouth 2 (two) times daily. 180 tablet 1     aspirin 81 mg Tab Take 81 mg by mouth once daily. 1 Tablet Oral Every day       atorvastatin (LIPITOR) 40 MG tablet Take 1 tablet (40 mg total) by mouth every evening. 90 tablet 3     blood sugar diagnostic (ACCU-CHEK EDIN PLUS TEST STRP) Strp TEST BLOOD SUGARS 4 TIMES DAILY 150 strip 11     celecoxib (CELEBREX) 200 MG capsule Take 1 capsule (200 mg total) by mouth once daily. 10 capsule 0     cilostazoL (PLETAL) 100 MG Tab Take 1 tablet (100 mg total) by mouth 2 (two) times daily. 60 tablet 11     dapagliflozin (FARXIGA) 10 mg tablet Take 1 tablet (10 mg total) by mouth once daily. (Patient not taking: Reported on 7/10/2023) 90 tablet 3     diclofenac sodium (VOLTAREN) 1 % Gel Apply 2 g topically 3 (three) times daily. Apply to the area of pain 2-3x per day or night as needed (Patient not taking: Reported on 7/10/2023) 100 g 3     EScitalopram oxalate (LEXAPRO) 10 MG tablet Take 1 tablet (10 mg total) by mouth once daily. 90 tablet 2     gabapentin  "(NEURONTIN) 300 MG capsule Take 1 capsule (300 mg) in the morning and 2 capsules (600 mg) in the evening (Patient not taking: Reported on 7/11/2023) 90 capsule 0     glipiZIDE (GLUCOTROL) 10 MG TR24 Take 1 tablet (10 mg total) by mouth daily with breakfast. 90 tablet 3     hydrALAZINE (APRESOLINE) 25 MG tablet Take 1 tablet (25 mg total) by mouth every 12 (twelve) hours. 60 tablet 2     insulin detemir U-100, Levemir, (LEVEMIR FLEXPEN) 100 unit/mL (3 mL) InPn pen Inject 14 Units into the skin every evening. (Patient taking differently: Inject into the skin every evening. Adjusted dose based on glucose) 12 mL 3     isosorbide mononitrate (ISMO,MONOKET) 10 mg tablet Take 1 tablet (10 mg total) by mouth once daily. 90 tablet 2     metoprolol tartrate (LOPRESSOR) 50 MG tablet Take 50 mg by mouth 2 (two) times daily.       multivitamin (THERAGRAN) per tablet Take 1 tablet by mouth once daily.       oxyCODONE (ROXICODONE) 5 MG immediate release tablet Take 1 tablet (5 mg total) by mouth every 4 (four) hours as needed for Pain. 42 tablet 0     pen needle, diabetic (NOVOTWIST) 32 gauge x 1/5" Ndle Use 1 needle to inject insulin four times daily 400 each 2     pen needle, diabetic 32 gauge x 5/32" Ndle Use as directed 100 each 0        Objective:     Vital Signs (Most Recent):  Temp: 96.8 °F (36 °C) (08/03/23 0736)  Pulse: 78 (08/03/23 1100)  Resp: (!) 22 (08/03/23 1100)  BP: 121/65 (08/03/23 1100)  SpO2: 100 % (08/03/23 1100) Vital Signs (24h Range):  Temp:  [96.8 °F (36 °C)-98 °F (36.7 °C)] 96.8 °F (36 °C)  Pulse:  [] 78  Resp:  [16-22] 22  SpO2:  [84 %-100 %] 100 %  BP: ()/(43-82) 121/65     Weight: 58.4 kg (128 lb 12 oz) (08/02/23 2334)  Body mass index is 22.1 kg/m².       Physical Exam  Vitals and nursing note reviewed.   Constitutional:       Appearance: She is not ill-appearing or toxic-appearing.   HENT:      Head: Normocephalic and atraumatic.   Eyes:      General:         Right eye: No discharge.       "   Left eye: No discharge.      Extraocular Movements: Extraocular movements intact.   Cardiovascular:      Rate and Rhythm: Normal rate.   Pulmonary:      Effort: Respiratory distress present.   Abdominal:      General: There is no distension.      Palpations: Abdomen is soft.      Tenderness: There is abdominal tenderness. There is no guarding or rebound.   Skin:     General: Skin is warm and dry.      Coloration: Skin is not jaundiced.   Neurological:      General: No focal deficit present.      Mental Status: She is alert.            MELD 3.0: 12 at 8/3/2023  4:03 AM  MELD-Na: 8 at 8/3/2023  4:03 AM  Calculated from:  Serum Creatinine: 0.8 mg/dL (Using min of 1 mg/dL) at 8/3/2023  4:03 AM  Serum Sodium: 140 mmol/L (Using max of 137 mmol/L) at 8/3/2023  4:03 AM  Total Bilirubin: 0.7 mg/dL (Using min of 1 mg/dL) at 8/3/2023  4:03 AM  Serum Albumin: 2.1 g/dL at 8/3/2023  4:03 AM  INR(ratio): 1.2 at 8/2/2023  8:42 PM  Age at listing (hypothetical): 63 years  Sex: Female at 8/3/2023  4:03 AM      Significant Labs:  Labs within the past month have been reviewed.    Significant Imaging:  CT imaging reviewed

## 2023-08-03 NOTE — PLAN OF CARE
"POC reviewed with Mariann Huff and family. Pt returned from Cath lab today. Aggrastat started. Patient has not urinated for several hours, MD made aware. 250 ml LR bolus administered and patient increased PO intake. Midline team consulted for inability to maintain venous access. Questions and concerns addressed. See below and flowsheets for full assessment and VS info.       Neuro:  [place] Coma Scale  Best Eye Response: 4-->(E4) spontaneous  Best Motor Response: 6-->(M6) obeys commands  Best Verbal Response: 5-->(V5) oriented  [place] Coma Scale Score: 15  Assessment Qualifiers: patient not sedated/intubated  Pupil PERRLA: yes    24 hr Temp:  [96.8 °F (36 °C)-98.2 °F (36.8 °C)]     CV:  Rhythm: normal sinus rhythm   DVT prophylaxis:                              Pulses  Right Radial Pulse: 1+ (weak)  Left Radial Pulse: 1+ (weak)  Right Dorsalis Pedis Pulse: 1+ (weak)  Left Dorsalis Pedis Pulse: 1+ (weak)  Right Posterior Tibial Pulse: 1+ (weak)  Left Posterior Tibial Pulse: 1+ (weak)    Resp:          GI/:  GI prophylaxis: no  Diet/Nutrition Received: low saturated fat/low cholesterol, 2 gram sodium  Last Bowel Movement: 08/02/23      Intake/Output Summary (Last 24 hours) at 8/3/2023 1740  Last data filed at 8/3/2023 1700  Gross per 24 hour   Intake 3170.64 ml   Output 240 ml   Net 2930.64 ml          Labs/Accuchecks:  Recent Labs   Lab 08/02/23 2042 08/02/23 2138 08/03/23  0404   WBC 6.99 10.15 10.61  10.61   RBC 2.23* 2.94* 3.30*  3.30*   HGB 5.4* 7.3* 8.0*  8.0*   HCT 18.9* 24.4* 28.5*  28.5*    503* 395  395      Recent Labs   Lab 08/02/23 2042 08/03/23  0404   INR 1.2  --    APTT 27.0 42.5*      Recent Labs     08/03/23  0403      K 3.6   CO2 25   CL 99   BUN 18   CREATININE 0.8   ALKPHOS 901*   ALT 26   *   BILITOT 0.7       Recent Labs   Lab 08/02/23  1932 08/02/23  2339 08/03/23  0403   TROPONINI 2.230* 14.519* 29.578*    No results for input(s): "PH", "PCO2", "PO2", "HCO3", " ""POCSATURATED", "BE" in the last 72 hours.    Electrolytes: Electrolytes replaced  Accuchecks: Q6H    Gtts/LDAs:   tirofiban-0.9% sodium chloride 12.5 mg/250ml 0.15 mcg/kg/min (08/03/23 0932)    tirofiban-0.9% sodium chloride 12.5 mg/250ml 0.15 mcg/kg/min (08/03/23 1700)       Lines/Drains/Airways       Peripheral Intravenous Line  Duration                  Peripheral IV - Single Lumen 08/02/23 1747 22 G Posterior;Right Hand <1 day         Peripheral IV - Single Lumen 08/02/23 2211 20 G Anterior;Proximal;Right Forearm <1 day                    Skin/Wounds  Bathing/Skin Care: electrode patches/site rotation;dressed/undressed (08/02/23 2336)  Wounds: No  Wound care consulted: No    "

## 2023-08-03 NOTE — BRIEF OP NOTE
Brief Operative Note:    : Aime George MD     Referring Physician: Self,Aaareferral     All Operators: Surgeon(s):  Aime George MD Davis, Arthur P, MD Kim, Aron Garland MD     Preoperative Diagnosis: Chest pain [R07.9]     Postop Diagnosis: Chest pain [R07.9]    Treatments/Procedures: Procedure(s) (LRB):  Angiogram, Coronary, with Left Heart Cath (N/A)  Ventriculogram, Left  INSERTION, CATHETER, RIGHT HEART (Right)    Access: Right CFA, Right CFV    Findings:Severe coronary artery disease is present.     See catheterization report for full details.    Intervention: RHC/LHC    See catheterization report for full details.    Closure device: Perclose       Plan:  - Post cath protocol   - IVF @ 100 cc/kg/hr x 2 hours  - Bed rest x 6 hours   - Continue medical management of NSTEMI. Aggrastat overnight. No stents placed.  - Continue high intensity statin therapy (LDL goal < 70)  - Risk factor reduction (BP <130/80 mmHg, glycemic control, etc)  - Follow up with outpatient cardiologist    Estimated Blood loss: 20 cc    Specimens removed: No    Aron Raya MD  Interventional Cardiology PGY-4

## 2023-08-03 NOTE — ED NOTES
Telemetry Verification   Patient placed on Telemetry Box  Verified with War Room  Box # [id number]   Monitor Tech War room   Rate 80   Rhythm NSR

## 2023-08-03 NOTE — SUBJECTIVE & OBJECTIVE
Past Medical History:   Diagnosis Date    Anticoagulant long-term use     Asthma     Back pain     Bradycardia, unspecified 2019    The etiology of the bradycardic episode is unclear.  The have appear to be respiratory in origin (at least the 1st episode).  We will continue to monitor carefully.  We are awaiting evaluation by Cardiology.      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, with long-term current use of insulin 2020       Past Surgical History:   Procedure Laterality Date    ANTEGRADE NEPHROSTOGRAPHY Left 2019    Procedure: Nephrostogram - antegrade;  Surgeon: Robin Boyd MD;  Location: [facility] OR [address] [place] [address];  Service: Urology;  Laterality: Left;    BRONCHOSCOPY N/A 2019    Procedure: BRONCHOSCOPY;  Surgeon: Sean Ruano MD;  Location: [facility] OR [address] [place] [address];  Service: General;  Laterality: N/A;    CARDIAC CATHETERIZATION      CATARACT EXTRACTION      cataract extraction left eye      cataracts      CAUDAL EPIDURAL STEROID INJECTION N/A 2019    Procedure: Injection-steroid-epidural-caudal;  Surgeon: Dave Bentley Jr., MD;  Location: [facility] PAIN MGT;  Service: Pain Management;  Laterality: N/A;     SECTION, LOW TRANSVERSE      COLONOSCOPY N/A 2019    Procedure: COLONOSCOPY;  Surgeon: Al Alaniz MD;  Location: [facility] ENDO ([address] [place] [address]);  Service: Endoscopy;  Laterality: N/A;     CORONARY ANGIOPLASTY      CYSTOGRAM N/A 12/11/2019    Procedure: CYSTOGRAM INTRAOP;  Surgeon: Robin Boyd MD;  Location: [facility] OR 2ND FLR;  Service: Urology;  Laterality: N/A;  1 HOUR    CYSTOSCOPY W/ RETROGRADES Left 12/11/2019    Procedure: CYSTOSCOPY, WITH RETROGRADE PYELOGRAM;  Surgeon: Robin Boyd MD;  [facility];  Service: Urology;  Laterality: Left;  fluro    ENDOSCOPIC ULTRASOUND OF UPPER GASTROINTESTINAL TRACT N/A 6/15/2023    Procedure: ULTRASOUND, UPPER GI TRACT, ENDOSCOPIC;  Surgeon: Lisa Kim MD;  Location: [facility] ENDO (2ND FLR);  Service: Endoscopy;  Laterality: N/A;    ERCP N/A 6/15/2023    Procedure: ERCP (ENDOSCOPIC RETROGRADE CHOLANGIOPANCREATOGRAPHY);  Surgeon: Lisa Kim MD;  Location: [facility] ENDO (2ND FLR);  Service: Endoscopy;  Laterality: N/A;    ESOPHAGOGASTRODUODENOSCOPY W/ PEG  5/2/2019    Procedure: EGD, WITH PEG TUBE INSERTION;  Surgeon: Sean Ruano MD;  [facility];  Service: General;;    EXCISION TURBINATE, SUBMUCOUS      FLEXIBLE SIGMOIDOSCOPY N/A 5/13/2019    Procedure: SIGMOIDOSCOPY, FLEXIBLE;  Surgeon: ALBERTO Amin MD;  Location: [facility] ENDO (2ND FLR);  Service: Endoscopy;  Laterality: N/A;    FLEXIBLE SIGMOIDOSCOPY N/A 5/21/2019    Procedure: SIGMOIDOSCOPY, FLEXIBLE;  Surgeon: ALEBRTO Amin MD;  [facility]  Service: Endoscopy;  Laterality: N/A;    FUSION OF LUMBAR SPINE BY ANTERIOR APPROACH Left 4/12/2019    Procedure: FUSION, SPINE, LUMBAR, ANTERIOR APPROACH Left L5-S1 Anterior to Psoa Interbody Fusion, L5-S1 Posterior Instrumentation;  Surgeon: Mk George MD;  [facility];  Service: Neurosurgery;  Laterality: Left;  Porcedure:  Left L5-S1 Anterior to Psoa Interbody Fusion, L5-S1 Posterior Instrumentation  Surgery Time: 4 Hrs  LOS: 2-3  Anesthesia: General   Blood: Type & Screen  R    HAND SURGERY Left     HAND SURGERY Right     torn ligament repair/ Dr. Yeboah    HYSTERECTOMY       INJECTION OF STEROID Right 12/10/2020    Procedure: INJECTION, STEROID Right SI Joint Block and Steroid Injection;  Surgeon: Mk George MD;  Location: [facility] OR;  Service: Neurosurgery;  Laterality: Right;  Procedure: Right SI Joint Block and Steroid Injection   SUrgery Time: 30 Min  LOS: 0  Anesthesia: MAC  Radiology: C-arm  Bed: Tomer 4 Poster  Position: Prone    INJECTION OF STEROID Right 9/28/2021    Procedure: INJECTION, STEROID Right SI joint block & steroid injection;  Surgeon: Mk George MD;  Location: [facility] OR;  Service: Neurosurgery;  Laterality: Right;  Procedure: Right SI joint block & steroid injection  Surgery Time: 30m  Anesthesia: Local MAC  Radiology: C-arm  Bed: Regular  Position: Prone    LAPAROSCOPIC CHOLECYSTECTOMY N/A 6/23/2023    Procedure: CHOLECYSTECTOMY, LAPAROSCOPIC;  Surgeon: Richard Penny MD;  Location: [address];  Service: General;  Laterality: N/A;    left foot surgery      left wrist surgery      LYSIS OF ADHESIONS N/A 2/19/2020    Procedure: LYSIS, ADHESIONS;  Surgeon: Robin Boyd MD;  Location: [address];  Service: Urology;  Laterality: N/A;    NASAL SEPTUM SURGERY  5/7/15    OPEN REDUCTION AND INTERNAL FIXATION (ORIF) OF FRACTURE OF PROXIMAL HUMERUS Left 7/12/2023    Procedure: ORIF, FRACTURE, HUMERUS, PROXIMAL ;  Surgeon: Tomasz Leary MD;  Location: [address];  Service: Orthopedics;  Laterality: Left;    PERCUTANEOUS NEPHROSTOMY Left 4/21/2019    Procedure: Creation, Nephrostomy, Percutaneous;  Surgeon: Karina Surgeon;  Location: [facility];  Service: Anesthesiology;  Laterality: Left;    REPAIR OF URETER  4/12/2019    Procedure: REPAIR, URETER;  Surgeon: Mk George MD;  Location: [address];  Service: Neurosurgery;;    REPLACEMENT OF NEPHROSTOMY TUBE N/A 7/18/2019    Procedure: REPLACEMENT, NEPHROSTOMY TUBE;  Surgeon: [facility] Diagnostic Provider;  Location: [address];  Service: Anesthesiology;  Laterality: N/A;  188     REPLACEMENT OF NEPHROSTOMY TUBE N/A 7/24/2019    Procedure: REPLACEMENT, NEPHROSTOMY TUBE;  Surgeon: [facility] Diagnostic Provider;  Location: [facility] OR 2ND FLR;  Service: Anesthesiology;  Laterality: N/A;  188    REPLACEMENT OF NEPHROSTOMY TUBE N/A 10/7/2019    Procedure: REPLACEMENT, NEPHROSTOMY TUBE;  Surgeon: [facility] Diagnostic Provider;  Location: [facility] OR 2ND FLR;  Service: Anesthesiology;  Laterality: N/A;  189    REPLACEMENT OF NEPHROSTOMY TUBE N/A 11/25/2019    Procedure: REPLACEMENT, NEPHROSTOMY TUBE;  Surgeon: [facility] Diagnostic Provider;  Location: [facility] OR 2ND FLR;  Service: Anesthesiology;  Laterality: N/A;  Room 188/Bessy    REPLACEMENT OF NEPHROSTOMY TUBE Right 2/19/2020    Procedure: REPLACEMENT, NEPHROSTOMY TUBE removal removal;  Surgeon: Robin Boyd MD;  [facility];  Service: Urology;  Laterality: Right;    rt elbow surgery      S/P LAD COATED STENT  05/14/2010    6 total     S/P OM1 STENT  08/2003    SINUS SURGERY      F.E.S.S.    TRACHEOSTOMY N/A 5/2/2019    Procedure: CREATION, TRACHEOSTOMY;  Surgeon: Sean Ruano MD;  [facility];  Service: General;  Laterality: N/A;    TUBAL LIGATION      URETERAL REIMPLANTION Left 2/19/2020    Procedure: REIMPLANTATION, URETER WITH PSOAS HITCH;  Surgeon: Robin Boyd MD;  [facility];  Service: Urology;  Laterality: Left;       Review of patient's allergies indicates:   Allergen Reactions    Milk containing products (dairy)      Causes GI distress       PTA Medications   Medication Sig    ACCU-CHEK EDIN PLUS METER Misc     acetaminophen (TYLENOL) 650 MG TbSR Take 1 tablet (650 mg total) by mouth every 8 (eight) hours.    albuterol (PROVENTIL/VENTOLIN HFA) 90 mcg/actuation inhaler Inhale 2 puffs into the lungs every 6 (six) hours as needed for Wheezing.    amLODIPine (NORVASC) 10 MG tablet Take 1 tablet (10 mg total) by mouth once daily.    apixaban (ELIQUIS) 2.5 mg Tab Take 1 tablet (2.5 mg total) by mouth 2 (two) times  "daily.    aspirin 81 mg Tab Take 81 mg by mouth once daily. 1 Tablet Oral Every day    atorvastatin (LIPITOR) 40 MG tablet Take 1 tablet (40 mg total) by mouth every evening.    blood sugar diagnostic (ACCU-CHEK EDIN PLUS TEST STRP) Strp TEST BLOOD SUGARS 4 TIMES DAILY    celecoxib (CELEBREX) 200 MG capsule Take 1 capsule (200 mg total) by mouth once daily.    cilostazoL (PLETAL) 100 MG Tab Take 1 tablet (100 mg total) by mouth 2 (two) times daily.    dapagliflozin (FARXIGA) 10 mg tablet Take 1 tablet (10 mg total) by mouth once daily. (Patient not taking: Reported on 7/10/2023)    diclofenac sodium (VOLTAREN) 1 % Gel Apply 2 g topically 3 (three) times daily. Apply to the area of pain 2-3x per day or night as needed (Patient not taking: Reported on 7/10/2023)    EScitalopram oxalate (LEXAPRO) 10 MG tablet Take 1 tablet (10 mg total) by mouth once daily.    gabapentin (NEURONTIN) 300 MG capsule Take 1 capsule (300 mg) in the morning and 2 capsules (600 mg) in the evening (Patient not taking: Reported on 7/11/2023)    glipiZIDE (GLUCOTROL) 10 MG TR24 Take 1 tablet (10 mg total) by mouth daily with breakfast.    hydrALAZINE (APRESOLINE) 25 MG tablet Take 1 tablet (25 mg total) by mouth every 12 (twelve) hours.    insulin detemir U-100, Levemir, (LEVEMIR FLEXPEN) 100 unit/mL (3 mL) InPn pen Inject 14 Units into the skin every evening. (Patient taking differently: Inject into the skin every evening. Adjusted dose based on glucose)    isosorbide mononitrate (ISMO,MONOKET) 10 mg tablet Take 1 tablet (10 mg total) by mouth once daily.    metoprolol tartrate (LOPRESSOR) 50 MG tablet Take 50 mg by mouth 2 (two) times daily.    multivitamin (THERAGRAN) per tablet Take 1 tablet by mouth once daily.    oxyCODONE (ROXICODONE) 5 MG immediate release tablet Take 1 tablet (5 mg total) by mouth every 4 (four) hours as needed for Pain.    pen needle, diabetic (NOVOTWIST) 32 gauge x 1/5" Ndle Use 1 needle to inject insulin four times " "daily    pen needle, diabetic 32 gauge x 5/32" Ndle Use as directed     Family History       Problem Relation (Age of Onset)    Diabetes Mother, Father, Sister, Brother, Sister    Heart attack Father    Heart disease Mother    Leukemia Father    No Known Problems Sister, Brother, Brother, Maternal Grandmother, Maternal Grandfather, Paternal Grandmother, Paternal Grandfather, Son, Son, Maternal Aunt, Maternal Uncle, Paternal Aunt, Paternal Uncle          Tobacco Use    Smoking status: Never    Smokeless tobacco: Never   Substance and Sexual Activity    Alcohol use: No     Alcohol/week: 0.0 standard drinks of alcohol    Drug use: No    Sexual activity: Yes     Partners: Male     Birth control/protection: Post-menopausal     Comment:      Review of Systems   Constitutional: Positive for malaise/fatigue. Negative for chills, fever and night sweats.   HENT:  Negative for congestion, nosebleeds, sore throat, stridor and tinnitus.    Eyes:  Negative for blurred vision, discharge, double vision, pain, vision loss in left eye, vision loss in right eye, visual disturbance and visual halos.   Cardiovascular:  Negative for chest pain, dyspnea on exertion, leg swelling, near-syncope, orthopnea, palpitations and syncope.   Respiratory:  Negative for cough, hemoptysis, shortness of breath, snoring, sputum production and wheezing.    Endocrine: Negative for cold intolerance, heat intolerance and polydipsia.   Hematologic/Lymphatic: Negative for adenopathy and bleeding problem. Does not bruise/bleed easily.   Skin:  Negative for color change, dry skin, flushing, poor wound healing and suspicious lesions.   Musculoskeletal:  Negative for arthritis, back pain, gout, joint pain and joint swelling.   Gastrointestinal:  Positive for nausea. Negative for bloating, abdominal pain, constipation and diarrhea.   Genitourinary:  Negative for dysuria, frequency and hematuria.   Neurological:  Negative for dizziness, focal weakness, " headaches, light-headedness, loss of balance, numbness and weakness.   Psychiatric/Behavioral:  Negative for altered mental status, hallucinations and hypervigilance. The patient is not nervous/anxious.      Objective:     Vital Signs (Most Recent):  Temp: 96.8 °F (36 °C) (08/03/23 0736)  Pulse: 90 (08/03/23 0736)  Resp: 18 (08/03/23 0736)  BP: 130/64 (08/03/23 0736)  SpO2: 98 % (08/03/23 0736) Vital Signs (24h Range):  Temp:  [96.8 °F (36 °C)-98 °F (36.7 °C)] 96.8 °F (36 °C)  Pulse:  [] 90  Resp:  [16-20] 18  SpO2:  [84 %-100 %] 98 %  BP: ()/(43-82) 130/64     Weight: 58.4 kg (128 lb 12 oz)  Body mass index is 22.1 kg/m².    SpO2: 98 %         Intake/Output Summary (Last 24 hours) at 8/3/2023 0740  Last data filed at 8/3/2023 0627  Gross per 24 hour   Intake 1040 ml   Output 240 ml   Net 800 ml       Lines/Drains/Airways       Peripheral Intravenous Line  Duration                  Peripheral IV - Single Lumen 08/02/23 1747 22 G Posterior;Right Hand <1 day         Peripheral IV - Single Lumen 08/02/23 2211 20 G Anterior;Proximal;Right Forearm <1 day                     Physical Exam  Constitutional:       General: She is not in acute distress.     Appearance: Normal appearance. She is normal weight. She is not toxic-appearing.   HENT:      Head: Normocephalic.   Eyes:      General:         Right eye: No discharge.         Left eye: No discharge.      Extraocular Movements: Extraocular movements intact.      Conjunctiva/sclera: Conjunctivae normal.      Pupils: Pupils are equal, round, and reactive to light.   Cardiovascular:      Rate and Rhythm: Normal rate and regular rhythm.      Pulses: Normal pulses.      Heart sounds: Normal heart sounds. No murmur heard.     No friction rub.   Pulmonary:      Effort: Pulmonary effort is normal. No respiratory distress.      Breath sounds: Normal breath sounds. No wheezing or rales.   Abdominal:      General: Abdomen is flat. Bowel sounds are normal. There is no  distension.      Palpations: Abdomen is soft.      Tenderness: There is no abdominal tenderness.   Musculoskeletal:         General: No swelling, deformity or signs of injury. Normal range of motion.      Cervical back: Normal range of motion and neck supple. No rigidity.      Right lower leg: No edema.      Left lower leg: No edema.   Lymphadenopathy:      Cervical: No cervical adenopathy.   Skin:     General: Skin is warm and dry.      Capillary Refill: Capillary refill takes less than 2 seconds.      Coloration: Skin is not jaundiced.      Findings: No bruising or lesion.   Neurological:      General: No focal deficit present.      Mental Status: She is oriented to person, place, and time. Mental status is at baseline.      Cranial Nerves: No cranial nerve deficit.      Motor: No weakness.   Psychiatric:         Mood and Affect: Mood normal.         Behavior: Behavior normal.         Thought Content: Thought content normal.         Judgment: Judgment normal.

## 2023-08-03 NOTE — PLAN OF CARE
[facility] - Cardiac Intensive Care  Initial Discharge Assessment       Primary Care Provider: Jasbir Haney MD    Admission Diagnosis: Epigastric pain [R10.13]  Pancreatic mass [K86.89]  NSTEMI (non-ST elevated myocardial infarction) [I21.4]    Admission Date: 8/2/2023  Expected Discharge Date: 8/7/2023    Transition of Care Barriers: None    Payor: [employer] / Plan: BCBS ALL OUT OF STATE / Product Type: PPO /     Extended Emergency Contact Information  Primary Emergency Contact: Shamir Huff  Address: [facility] [place] [address] [facility]  Home Phone: [phone]  Mobile Phone: [phone]  Relation: Spouse  Secondary Emergency Contact: Joseph Huff  Mobile Phone: [phone]  Relation: Son  Preferred language: English    Discharge Plan A: Home with family  Discharge Plan B: Home Health      [facility] [place] [address] [facility] [address]  Phone: [phone] Fax: [phone]    [facility] #[id number] - [address] AT CORNER OF [address] [address]  Phone: [phone] Fax: [phone]    [facility] - [place], [place] - [address] [place] [address]  Phone: [phone] Fax: [phone]      Initial Assessment (most recent)       Adult Discharge Assessment - 08/03/23 1612          Discharge Assessment    Assessment Type Discharge Planning Assessment     Confirmed/corrected address, phone number and insurance Yes     Confirmed Demographics Correct on Facesheet     Source of Information patient     Communicated REBECCA with patient/caregiver Date not available/Unable to determine     Reason For Admission NSTEMI     People in Home spouse     Do you expect to return to your current living situation? Yes     Do you have help at home or someone to help you manage your care at home? Yes     Who are your caregiver(s) and their phone number(s)?  Shamir Huff (spouse) [phone]     Prior to hospitilization cognitive status: Alert/Oriented     Current cognitive status: Alert/Oriented     Walking or Climbing Stairs ambulation difficulty, requires equipment     Mobility Management cane to ambulate     Equipment Currently Used at Home cane, straight;shower chair;glucometer;bedside commode   built in shower chair !    Readmission within 30 days? No     Patient currently being followed by outpatient case management? No     Do you currently have service(s) that help you manage your care at home? No     Do you take prescription medications? Yes     Do you have prescription coverage? Yes     Coverage BCBS and Medicare A&B     Do you have any problems affording any of your prescribed medications? No     Is the patient taking medications as prescribed? yes     Who is going to help you get home at discharge? Shamir Huff (spouse) [phone]     How do you get to doctors appointments? family or friend will provide     Are you on dialysis? No     Do you take coumadin? No     Discharge Plan A Home with family     Discharge Plan B Home Health     DME Needed Upon Discharge  other (see comments)   TBD    Discharge Plan discussed with: Patient;Spouse/sig other     Name(s) and Number(s) Shamir Huff (spouse) [phone]     Transition of Care Barriers None        OTHER    Name(s) of People in Home Shamir Refugio (spouse) [phone]                        CM met with the patient and Shamir Refugio (spouse) [phone] at the bedside. Discussed the discharge process and placed our contact numbers on the white board in the room. Patient verified her name and , insurance and PCP. Patient lives with Shamir Refugio (spouse) [phone] in a single story house and one step entrance to home and spouse will bring her home at time of discharge. She has a glucometer , cane(straight) and bsc with a built in chair in her shower. She is not on coumadin and is not dialysis. Will continue to  monitor for discharge needs. She stated her residence address is [address] [place] [address] in [facility].     Conrado Louise RN CM   [phone]

## 2023-08-03 NOTE — SUBJECTIVE & OBJECTIVE
Past Medical History:   Diagnosis Date    Anticoagulant long-term use     Asthma     Back pain     Bradycardia, unspecified 2019    The etiology of the bradycardic episode is unclear.  The have appear to be respiratory in origin (at least the 1st episode).  We will continue to monitor carefully.  We are awaiting evaluation by Cardiology.      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, with long-term current use of insulin 2020       Past Surgical History:   Procedure Laterality Date    ANTEGRADE NEPHROSTOGRAPHY Left 2019    Procedure: Nephrostogram - antegrade;  Surgeon: Robin Boyd MD;  Location: [facility] OR [address] [place] [address];  Service: Urology;  Laterality: Left;    BRONCHOSCOPY N/A 2019    Procedure: BRONCHOSCOPY;  Surgeon: Sean Ruano MD;  Location: [facility] OR [address] [place] [address];  Service: General;  Laterality: N/A;    CARDIAC CATHETERIZATION      CATARACT EXTRACTION      cataract extraction left eye      cataracts      CAUDAL EPIDURAL STEROID INJECTION N/A 2019    Procedure: Injection-steroid-epidural-caudal;  Surgeon: Dave Bentley Jr., MD;  Location: [facility] PAIN MGT;  Service: Pain Management;  Laterality: N/A;     SECTION, LOW TRANSVERSE      COLONOSCOPY N/A 2019    Procedure: COLONOSCOPY;  Surgeon: Al Alaniz MD;  Location: [facility] ENDO ([address] [place] [address]);  Service: Endoscopy;  Laterality: N/A;     CORONARY ANGIOPLASTY      CYSTOGRAM N/A 12/11/2019    Procedure: CYSTOGRAM INTRAOP;  Surgeon: Robin Boyd MD;  Location: [facility] OR 2ND FLR;  Service: Urology;  Laterality: N/A;  1 HOUR    CYSTOSCOPY W/ RETROGRADES Left 12/11/2019    Procedure: CYSTOSCOPY, WITH RETROGRADE PYELOGRAM;  Surgeon: Robin Boyd MD;  [facility];  Service: Urology;  Laterality: Left;  fluro    ENDOSCOPIC ULTRASOUND OF UPPER GASTROINTESTINAL TRACT N/A 6/15/2023    Procedure: ULTRASOUND, UPPER GI TRACT, ENDOSCOPIC;  Surgeon: Lisa Kim MD;  Location: [facility] ENDO (2ND FLR);  Service: Endoscopy;  Laterality: N/A;    ERCP N/A 6/15/2023    Procedure: ERCP (ENDOSCOPIC RETROGRADE CHOLANGIOPANCREATOGRAPHY);  Surgeon: Lisa Kim MD;  Location: [facility] ENDO (2ND FLR);  Service: Endoscopy;  Laterality: N/A;    ESOPHAGOGASTRODUODENOSCOPY W/ PEG  5/2/2019    Procedure: EGD, WITH PEG TUBE INSERTION;  Surgeon: Sean Ruano MD;  [facility];  Service: General;;    EXCISION TURBINATE, SUBMUCOUS      FLEXIBLE SIGMOIDOSCOPY N/A 5/13/2019    Procedure: SIGMOIDOSCOPY, FLEXIBLE;  Surgeon: ALBERTO Amin MD;  Location: [facility] ENDO (2ND FLR);  Service: Endoscopy;  Laterality: N/A;    FLEXIBLE SIGMOIDOSCOPY N/A 5/21/2019    Procedure: SIGMOIDOSCOPY, FLEXIBLE;  Surgeon: ALBERTO Amin MD;  [facility]  Service: Endoscopy;  Laterality: N/A;    FUSION OF LUMBAR SPINE BY ANTERIOR APPROACH Left 4/12/2019    Procedure: FUSION, SPINE, LUMBAR, ANTERIOR APPROACH Left L5-S1 Anterior to Psoa Interbody Fusion, L5-S1 Posterior Instrumentation;  Surgeon: Mk George MD;  [facility];  Service: Neurosurgery;  Laterality: Left;  Porcedure:  Left L5-S1 Anterior to Psoa Interbody Fusion, L5-S1 Posterior Instrumentation  Surgery Time: 4 Hrs  LOS: 2-3  Anesthesia: General   Blood: Type & Screen  R    HAND SURGERY Left     HAND SURGERY Right     torn ligament repair/ Dr. Yeboah    HYSTERECTOMY       INJECTION OF STEROID Right 12/10/2020    Procedure: INJECTION, STEROID Right SI Joint Block and Steroid Injection;  Surgeon: Mk George MD;  Location: [facility] OR;  Service: Neurosurgery;  Laterality: Right;  Procedure: Right SI Joint Block and Steroid Injection   SUrgery Time: 30 Min  LOS: 0  Anesthesia: MAC  Radiology: C-arm  Bed: Tomer 4 Poster  Position: Prone    INJECTION OF STEROID Right 9/28/2021    Procedure: INJECTION, STEROID Right SI joint block & steroid injection;  Surgeon: Mk George MD;  Location: [facility] OR;  Service: Neurosurgery;  Laterality: Right;  Procedure: Right SI joint block & steroid injection  Surgery Time: 30m  Anesthesia: Local MAC  Radiology: C-arm  Bed: Regular  Position: Prone    LAPAROSCOPIC CHOLECYSTECTOMY N/A 6/23/2023    Procedure: CHOLECYSTECTOMY, LAPAROSCOPIC;  Surgeon: Richard Penny MD;  Location: [address];  Service: General;  Laterality: N/A;    left foot surgery      left wrist surgery      LYSIS OF ADHESIONS N/A 2/19/2020    Procedure: LYSIS, ADHESIONS;  Surgeon: Robin Boyd MD;  Location: [address];  Service: Urology;  Laterality: N/A;    NASAL SEPTUM SURGERY  5/7/15    OPEN REDUCTION AND INTERNAL FIXATION (ORIF) OF FRACTURE OF PROXIMAL HUMERUS Left 7/12/2023    Procedure: ORIF, FRACTURE, HUMERUS, PROXIMAL ;  Surgeon: Tomasz Leary MD;  Location: [address];  Service: Orthopedics;  Laterality: Left;    PERCUTANEOUS NEPHROSTOMY Left 4/21/2019    Procedure: Creation, Nephrostomy, Percutaneous;  Surgeon: Karina Surgeon;  Location: [facility];  Service: Anesthesiology;  Laterality: Left;    REPAIR OF URETER  4/12/2019    Procedure: REPAIR, URETER;  Surgeon: Mk George MD;  Location: [address];  Service: Neurosurgery;;    REPLACEMENT OF NEPHROSTOMY TUBE N/A 7/18/2019    Procedure: REPLACEMENT, NEPHROSTOMY TUBE;  Surgeon: [place] Diagnostic Provider;  Location: [address];  Service: Anesthesiology;  Laterality: N/A;  188     REPLACEMENT OF NEPHROSTOMY TUBE N/A 7/24/2019    Procedure: REPLACEMENT, NEPHROSTOMY TUBE;  Surgeon: [facility] Diagnostic Provider;  Location: [facility] OR 2ND FLR;  Service: Anesthesiology;  Laterality: N/A;  188    REPLACEMENT OF NEPHROSTOMY TUBE N/A 10/7/2019    Procedure: REPLACEMENT, NEPHROSTOMY TUBE;  Surgeon: [facility]os Diagnostic Provider;  Location: [facility] OR 2ND FLR;  Service: Anesthesiology;  Laterality: N/A;  189    REPLACEMENT OF NEPHROSTOMY TUBE N/A 11/25/2019    Procedure: REPLACEMENT, NEPHROSTOMY TUBE;  Surgeon: [facility] Diagnostic Provider;  Location: [facility] OR [facility] FLR;  Service: Anesthesiology;  Laterality: N/A;  Room 188/Bessy    REPLACEMENT OF NEPHROSTOMY TUBE Right 2/19/2020    Procedure: REPLACEMENT, NEPHROSTOMY TUBE removal removal;  Surgeon: Robin Boyd MD;  [facility];  Service: Urology;  Laterality: Right;    rt elbow surgery      S/P LAD COATED STENT  05/14/2010    6 total     S/P OM1 STENT  08/2003    SINUS SURGERY      F.E.S.S.    TRACHEOSTOMY N/A 5/2/2019    Procedure: CREATION, TRACHEOSTOMY;  Surgeon: Sean Ruano MD;  [facility];  Service: General;  Laterality: N/A;    TUBAL LIGATION      URETERAL REIMPLANTION Left 2/19/2020    Procedure: REIMPLANTATION, URETER WITH PSOAS HITCH;  Surgeon: Robin Boyd MD;  [facility];  Service: Urology;  Laterality: Left;       Review of patient's allergies indicates:   Allergen Reactions    Milk containing products (dairy)      Causes GI distress       Current Facility-Administered Medications on File Prior to Encounter   Medication    0.9%  NaCl infusion    fentaNYL 50 mcg/mL injection  mcg    midazolam (VERSED) 1 mg/mL injection 0.5-4 mg     Current Outpatient Medications on File Prior to Encounter   Medication Sig    ACCU-CHEK EDIN PLUS METER Misc     acetaminophen (TYLENOL) 650 MG TbSR Take 1 tablet (650 mg total) by mouth every 8 (eight) hours.    albuterol (PROVENTIL/VENTOLIN HFA) 90 mcg/actuation inhaler  Inhale 2 puffs into the lungs every 6 (six) hours as needed for Wheezing.    amLODIPine (NORVASC) 10 MG tablet Take 1 tablet (10 mg total) by mouth once daily.    apixaban (ELIQUIS) 2.5 mg Tab Take 1 tablet (2.5 mg total) by mouth 2 (two) times daily.    aspirin 81 mg Tab Take 81 mg by mouth once daily. 1 Tablet Oral Every day    atorvastatin (LIPITOR) 40 MG tablet Take 1 tablet (40 mg total) by mouth every evening.    blood sugar diagnostic (ACCU-CHEK EDIN PLUS TEST STRP) Strp TEST BLOOD SUGARS 4 TIMES DAILY    celecoxib (CELEBREX) 200 MG capsule Take 1 capsule (200 mg total) by mouth once daily.    cilostazoL (PLETAL) 100 MG Tab Take 1 tablet (100 mg total) by mouth 2 (two) times daily.    dapagliflozin (FARXIGA) 10 mg tablet Take 1 tablet (10 mg total) by mouth once daily. (Patient not taking: Reported on 7/10/2023)    diclofenac sodium (VOLTAREN) 1 % Gel Apply 2 g topically 3 (three) times daily. Apply to the area of pain 2-3x per day or night as needed (Patient not taking: Reported on 7/10/2023)    EScitalopram oxalate (LEXAPRO) 10 MG tablet Take 1 tablet (10 mg total) by mouth once daily.    gabapentin (NEURONTIN) 300 MG capsule Take 1 capsule (300 mg) in the morning and 2 capsules (600 mg) in the evening (Patient not taking: Reported on 7/11/2023)    glipiZIDE (GLUCOTROL) 10 MG TR24 Take 1 tablet (10 mg total) by mouth daily with breakfast.    hydrALAZINE (APRESOLINE) 25 MG tablet Take 1 tablet (25 mg total) by mouth every 12 (twelve) hours.    insulin detemir U-100, Levemir, (LEVEMIR FLEXPEN) 100 unit/mL (3 mL) InPn pen Inject 14 Units into the skin every evening. (Patient taking differently: Inject into the skin every evening. Adjusted dose based on glucose)    isosorbide mononitrate (ISMO,MONOKET) 10 mg tablet Take 1 tablet (10 mg total) by mouth once daily.    metoprolol tartrate (LOPRESSOR) 50 MG tablet Take 50 mg by mouth 2 (two) times daily.    multivitamin (THERAGRAN) per tablet Take 1 tablet by  "mouth once daily.    oxyCODONE (ROXICODONE) 5 MG immediate release tablet Take 1 tablet (5 mg total) by mouth every 4 (four) hours as needed for Pain.    pen needle, diabetic (NOVOTWIST) 32 gauge x 1/5" Ndle Use 1 needle to inject insulin four times daily    pen needle, diabetic 32 gauge x 5/32" Ndle Use as directed     Family History       Problem Relation (Age of Onset)    Diabetes Mother, Father, Sister, Brother, Sister    Heart attack Father    Heart disease Mother    Leukemia Father    No Known Problems Sister, Brother, Brother, Maternal Grandmother, Maternal Grandfather, Paternal Grandmother, Paternal Grandfather, Son, Son, Maternal Aunt, Maternal Uncle, Paternal Aunt, Paternal Uncle          Tobacco Use    Smoking status: Never    Smokeless tobacco: Never   Substance and Sexual Activity    Alcohol use: No     Alcohol/week: 0.0 standard drinks of alcohol    Drug use: No    Sexual activity: Yes     Partners: Male     Birth control/protection: Post-menopausal     Comment:      Review of Systems   Constitutional: Positive for malaise/fatigue. Negative for chills, fever and night sweats.   HENT:  Negative for sore throat.    Cardiovascular:  Negative for chest pain, dyspnea on exertion, leg swelling, near-syncope, orthopnea, palpitations and syncope.   Respiratory:  Negative for cough, hemoptysis, shortness of breath, snoring, sputum production and wheezing.    Hematologic/Lymphatic: Negative for adenopathy and bleeding problem. Does not bruise/bleed easily.   Musculoskeletal:  Negative for arthritis, back pain, joint pain and joint swelling.   Gastrointestinal:  Positive for nausea. Negative for bloating, abdominal pain, constipation and diarrhea.   Genitourinary:  Negative for dysuria, frequency and hematuria.   Neurological:  Negative for dizziness, focal weakness, headaches, light-headedness, loss of balance, numbness and weakness.     Objective:     Vital Signs (Most Recent):  Temp: 96.8 °F (36 °C) " (08/03/23 0736)  Pulse: 78 (08/03/23 1100)  Resp: (!) 22 (08/03/23 1100)  BP: 121/65 (08/03/23 1100)  SpO2: 100 % (08/03/23 1100) Vital Signs (24h Range):  Temp:  [96.8 °F (36 °C)-98 °F (36.7 °C)] 96.8 °F (36 °C)  Pulse:  [] 78  Resp:  [16-22] 22  SpO2:  [84 %-100 %] 100 %  BP: ()/(43-82) 121/65     Weight: 58.4 kg (128 lb 12 oz)  Body mass index is 22.1 kg/m².    SpO2: 100 %         Intake/Output Summary (Last 24 hours) at 8/3/2023 1136  Last data filed at 8/3/2023 0745  Gross per 24 hour   Intake 1135.9 ml   Output 240 ml   Net 895.9 ml       Lines/Drains/Airways       Peripheral Intravenous Line  Duration                  Peripheral IV - Single Lumen 08/02/23 1747 22 G Posterior;Right Hand <1 day         Peripheral IV - Single Lumen 08/02/23 2211 20 G Anterior;Proximal;Right Forearm <1 day         Peripheral IV - Single Lumen 08/03/23 1054 20 G Anterior;Distal;Right Upper Arm <1 day                     Physical Exam  Vitals and nursing note reviewed.   Constitutional:       General: She is not in acute distress.     Appearance: She is not toxic-appearing or diaphoretic.   HENT:      Mouth/Throat:      Mouth: Mucous membranes are moist.      Pharynx: Oropharynx is clear.   Eyes:      Extraocular Movements: Extraocular movements intact.      Conjunctiva/sclera: Conjunctivae normal.   Cardiovascular:      Rate and Rhythm: Normal rate and regular rhythm.   Pulmonary:      Effort: Pulmonary effort is normal. No respiratory distress.      Breath sounds: No wheezing.   Abdominal:      General: There is no distension.      Palpations: Abdomen is soft.      Tenderness: There is no guarding.      Comments: Nonacute abdominal exam   Musculoskeletal:      Right lower leg: No edema.      Left lower leg: No edema.   Skin:     General: Skin is warm and dry.   Neurological:      Mental Status: She is alert. Mental status is at baseline.      Cranial Nerves: No dysarthria.   Psychiatric:         Mood and Affect: Mood  normal.         Behavior: Behavior normal.          Significant Labs: All pertinent lab results from the last 24 hours have been reviewed.    Significant Imaging:  All relevant imaging reviewed

## 2023-08-03 NOTE — ASSESSMENT & PLAN NOTE
- Interventional cardiology consulted for troponins to 29k, and potential EF drop to 40% in ED. Potential WMA and LV thrombus which is pending formal echo evaluation.Will evaluate with PCI/LHC. Patient is a GINGER candidate  EKG shows lateral ST changes. Prior Cath in 2014 with LAD 99% lesion which was stented, lcx 95%, ramus 80%; no recent stress tests.  -63 y.o. female with CAD s/p GINGER to LAD in 2014 (also Lcx 95%, ramus 80%), bilateral lower extremity PAD (2022, 100% R NANCY, 100% R PTA, 100% L PTA, 100% L Peroneal; eliquis 2,5mg), HTN, HLD, DM2, MURTAZA admitted for multiple hepatic lesions c/f metastases vs infections.  - EF 40% per POC, Trops 29k, Cr 0.8, p lt 395, hgb 8.0, ,   - Anti-platelet Therapy: aspirin brillinta  - Access: R Radial, R fem.   - Allergies: No shellfish / Iodine contrast allergy  - Pre-Hydration: NS  - Pre-Op Med: Bendaryl 50mg pO   - All patient's questions were answered.  -The risks, benefits and alternatives of the procedure were explained to the patient.   -The risks of coronary angiography include but are not limited to: bleeding, infection, heart rhythm abnormalities, allergic reactions, kidney injury and potential need for dialysis, stroke and death.   - Should stenting be indicated, the patient has agreed to dual anti-platelet therapy for 1-consecutive year with a drug-eluting stent and a minimum of 1-month with the use of a bare metal stent  - Additionally, pt is aware that non-compliance is likely to result in stent clotting with heart attack, heart failure, and/or death  -The risks of moderate sedation include hypotension, respiratory depression, arrhythmias, bronchospasm, and death.   - Informed consent was obtained and the  patient is agreeable to proceed with the procedure.

## 2023-08-03 NOTE — PROGRESS NOTES
Brief Cardiology Update Note    Appreciate urgent Interventional Cardiology assistance. Patient had LCx thrombus. Placed on tirofiban gtt. Transfer to CCU. Will need to re-prescribe Repatha on discharge.    Barry Perez MD  Cardiology PGY6  [facility]

## 2023-08-03 NOTE — HPI
"Ms. Huff is a 63F with CAD s/p stents 2003 and 2010, PAD, HFpEF, HTN, HLD, DM2, recent (6/2023) suspected biliary pancreatitis and biliary stricture treated with biliary sphincterotomy, biliary stent, and cholecystectomy, carotid stenosis, MURTAZA, asthma, history of DVT on Eliquis, history of tracheostomy now s/p removal who presents to the ER with 1 day of vomiting, stinging abdominal pain, and body aches.   She reports at baseline, she was feeling back to her normal health since her June hospitalization for pancreatitis; able to tolerate a diet and perform ADLs, until yesterday when the vomiting and aches started. The vomiting was intermittent and nonbloody, just food/gastric contents. She denies any chest pain or pressure, dyspnea, sweats, arm or jaw pain, diarrhea, blood in stool, or melena. Her las BM was this morning and was normal and soft. Of note, she says in the past when she had heart issues, it felt like heavy chest pressure and inability to breathe--so this is different from that.   In the ER, she was afebrile, HR 60-80, -120, SpO2 well on room air initially; however, she later developed SpO2 84% ORA with good waveform per nurse and was placed on 3L O2 with recovery of sats. Labs were significant for no leukocytosis, Hgb 7.7 (near recent baseline of 7-8), renal function at baseline, AlkPhos 982 (at recent baseline since pancreatitis episode), troponin 0.3>>later uptrended to 2.2. EKG showed TWI in lateral leads concerning for NSTEMI. CT A/P with contrast obtained given vomiting and recent history showed "innumerable low attenuating hepatic lesions new since previous exam," need to rule out malignancy vs multifocal abscess/infection, slightly increased pancreatic ductal dilation to 4mm minimally increased frmo prior, possible fullness of pancreatic head cannot exclude mass, biliary stent in expected location.   After receiving dronabinol, 1L fluid, and , the patient felt much better. She had " not yet received ticagrelor or heparin drip as ordered. She was admitted to hospital medicine for possible NSTEMI and further workup of intra-abdominal process vs infection.

## 2023-08-03 NOTE — H&P
30 mins of Critical Care time was provided in order to assess and manage the high probability of imminent or life-threatening deterioration of cardio-respiratory status requiring vasodilator support and pending intubation    Jose Frey - Cardiology [facility]  History & Physical    Patient Name: Mariann Huff  MRN: [id number]  Patient Class: IP- Inpatient  Admission Date: 8/2/2023  Attending Physician: Ayala Schofield MD   Primary Care Provider: Jasbir Haney MD         Patient information was obtained from patient, spouse/SO, past medical records and ER records.     Subjective:     Principal Problem:NSTEMI (non-ST elevated myocardial infarction)    Chief Complaint:   Chief Complaint   Patient presents with    Abdominal Pain     Endorses nausea and emesis        HPI: Ms. Huff is a 63F with CAD s/p stents 2003 and 2010, PAD, HFpEF, HTN, HLD, DM2, recent (6/2023) suspected biliary pancreatitis and biliary stricture treated with biliary sphincterotomy, biliary stent, and cholecystectomy, carotid stenosis, MURTAZA, asthma, history of DVT on Eliquis, history of tracheostomy now s/p removal who presents to the ER with 1 day of vomiting, stinging abdominal pain, and body aches.   She reports at baseline, she was feeling back to her normal health since her June hospitalization for pancreatitis; able to tolerate a diet and perform ADLs, until yesterday when the vomiting and aches started. The vomiting was intermittent and nonbloody, just food/gastric contents. She denies any chest pain or pressure, dyspnea, sweats, arm or jaw pain, diarrhea, blood in stool, or melena. Her las BM was this morning and was normal and soft. Of note, she says in the past when she had heart issues, it felt like heavy chest pressure and inability to breathe--so this is different from that.   In the ER, she was afebrile, HR 60-80, -120, SpO2 well on room air initially; however, she later developed SpO2 84% ORA with good waveform  "per nurse and was placed on 3L O2 with recovery of sats. Labs were significant for no leukocytosis, Hgb 7.7 (near recent baseline of 7-8), renal function at baseline, AlkPhos 982 (at recent baseline since pancreatitis episode), troponin 0.3>>later uptrended to 2.2. EKG showed TWI in lateral leads concerning for NSTEMI. CT A/P with contrast obtained given vomiting and recent history showed "innumerable low attenuating hepatic lesions new since previous exam," need to rule out malignancy vs multifocal abscess/infection, slightly increased pancreatic ductal dilation to 4mm minimally increased frmo prior, possible fullness of pancreatic head cannot exclude mass, biliary stent in expected location.   After receiving dronabinol, 1L fluid, and , the patient felt much better. She had not yet received ticagrelor or heparin drip as ordered. She was admitted to hospital medicine for possible NSTEMI and further workup of intra-abdominal process vs infection.       Past Medical History:   Diagnosis Date    Anticoagulant long-term use     Asthma     Back pain     Bradycardia, unspecified 05/08/2019    The etiology of the bradycardic episode is unclear.  The have appear to be respiratory in origin (at least the 1st episode).  We will continue to monitor carefully.  We are awaiting evaluation by Cardiology.      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 " and macular edema, with long-term current use of insulin 2020       Past Surgical History:   Procedure Laterality Date    ANTEGRADE NEPHROSTOGRAPHY Left 2019    Procedure: Nephrostogram - antegrade;  Surgeon: Robin Boyd MD;  [facility];  Service: Urology;  Laterality: Left;    BRONCHOSCOPY N/A 2019    Procedure: BRONCHOSCOPY;  Surgeon: Sean Ruano MD;  Location: [facility] OR [address] [place] [address];  Service: General;  Laterality: N/A;    CARDIAC CATHETERIZATION      CATARACT EXTRACTION      cataract extraction left eye      cataracts      CAUDAL EPIDURAL STEROID INJECTION N/A 2019    Procedure: Injection-steroid-epidural-caudal;  Surgeon: Dave Bentley Jr., MD;  Location: [facility] PAIN MGT;  Service: Pain Management;  Laterality: N/A;     SECTION, LOW TRANSVERSE      COLONOSCOPY N/A 2019    Procedure: COLONOSCOPY;  Surgeon: Al Alaniz MD;  [facility]  Service: Endoscopy;  Laterality: N/A;    CORONARY ANGIOPLASTY      CYSTOGRAM N/A 2019    Procedure: CYSTOGRAM INTRAOP;  Surgeon: Robin Boyd MD;  [facility];  Service: Urology;  Laterality: N/A;  1 HOUR    CYSTOSCOPY W/ RETROGRADES Left 2019    Procedure: CYSTOSCOPY, WITH RETROGRADE PYELOGRAM;  Surgeon: Robin Boyd MD;  [facility];  Service: Urology;  Laterality: Left;  fluro    ENDOSCOPIC ULTRASOUND OF UPPER GASTROINTESTINAL TRACT N/A 6/15/2023    Procedure: ULTRASOUND, UPPER GI TRACT, ENDOSCOPIC;  Surgeon: Lisa Kim MD;  [facility]  Service: Endoscopy;  Laterality: N/A;    ERCP N/A 6/15/2023    Procedure: ERCP (ENDOSCOPIC RETROGRADE CHOLANGIOPANCREATOGRAPHY);  Surgeon: Lisa Kim MD;  [facility]  Service: Endoscopy;  Laterality: N/A;    ESOPHAGOGASTRODUODENOSCOPY W/ PEG  2019    Procedure: EGD, WITH PEG TUBE INSERTION;  Surgeon: Sean Ruano MD;  [facility];  Service: General;;     EXCISION TURBINATE, SUBMUCOUS      FLEXIBLE SIGMOIDOSCOPY N/A 5/13/2019    Procedure: SIGMOIDOSCOPY, FLEXIBLE;  Surgeon: ALBERTO Amin MD;  Location: [facility] ENDO (2ND FLR);  Service: Endoscopy;  Laterality: N/A;    FLEXIBLE SIGMOIDOSCOPY N/A 5/21/2019    Procedure: SIGMOIDOSCOPY, FLEXIBLE;  Surgeon: ALBERTO Amin MD;  Location: [facility] ENDO (2ND FLR);  Service: Endoscopy;  Laterality: N/A;    FUSION OF LUMBAR SPINE BY ANTERIOR APPROACH Left 4/12/2019    Procedure: FUSION, SPINE, LUMBAR, ANTERIOR APPROACH Left L5-S1 Anterior to Psoa Interbody Fusion, L5-S1 Posterior Instrumentation;  Surgeon: Mk George MD;  Location: [facility] OR [address] [place] [address];  Service: Neurosurgery;  Laterality: Left;  Porcedure:  Left L5-S1 Anterior to Psoa Interbody Fusion, L5-S1 Posterior Instrumentation  Surgery Time: 4 Hrs  LOS: 2-3  Anesthesia: General   Blood: Type & Screen  R    HAND SURGERY Left     HAND SURGERY Right     torn ligament repair/ Dr. Yeboah    HYSTERECTOMY      INJECTION OF STEROID Right 12/10/2020    Procedure: INJECTION, STEROID Right SI Joint Block and Steroid Injection;  Surgeon: Mk George MD;  Location: [facility];  Service: Neurosurgery;  Laterality: Right;  Procedure: Right SI Joint Block and Steroid Injection   SUrgery Time: 30 Min  LOS: 0  Anesthesia: MAC  Radiology: C-arm  Bed: [place] [id number] Poster  Position: Prone    INJECTION OF STEROID Right 9/28/2021    Procedure: INJECTION, STEROID Right SI joint block & steroid injection;  Surgeon: Mk George MD;  Location: [facility];  Service: Neurosurgery;  Laterality: Right;  Procedure: Right SI joint block & steroid injection  Surgery Time: 30m  Anesthesia: Local MAC  Radiology: C-arm  Bed: Regular  Position: Prone    LAPAROSCOPIC CHOLECYSTECTOMY N/A 6/23/2023    Procedure: CHOLECYSTECTOMY, LAPAROSCOPIC;  Surgeon: Richard Penny MD;  Location: [facility] OR [address] [place] [address];  Service: General;  Laterality: N/A;    left foot surgery      left wrist surgery      LYSIS OF ADHESIONS N/A  2/19/2020    Procedure: LYSIS, ADHESIONS;  Surgeon: Robin Boyd MD;  Location: [address];  Service: Urology;  Laterality: N/A;    NASAL SEPTUM SURGERY  5/7/15    OPEN REDUCTION AND INTERNAL FIXATION (ORIF) OF FRACTURE OF PROXIMAL HUMERUS Left 7/12/2023    Procedure: ORIF, FRACTURE, HUMERUS, PROXIMAL ;  Surgeon: Tomasz Leary MD;  Location: [address];  Service: Orthopedics;  Laterality: Left;    PERCUTANEOUS NEPHROSTOMY Left 4/21/2019    Procedure: Creation, Nephrostomy, Percutaneous;  Surgeon: Karina Surgeon;  Location: [facility];  Service: Anesthesiology;  Laterality: Left;    REPAIR OF URETER  4/12/2019    Procedure: REPAIR, URETER;  Surgeon: Mk George MD;  Location: [address];  Service: Neurosurgery;;    REPLACEMENT OF NEPHROSTOMY TUBE N/A 7/18/2019    Procedure: REPLACEMENT, NEPHROSTOMY TUBE;  Surgeon: [facility] Diagnostic Provider;  Location: [address];  Service: Anesthesiology;  Laterality: N/A;  188    REPLACEMENT OF NEPHROSTOMY TUBE N/A 7/24/2019    Procedure: REPLACEMENT, NEPHROSTOMY TUBE;  Surgeon: [facility] Diagnostic Provider;  [address]  Service: Anesthesiology;  Laterality: N/A;  188    REPLACEMENT OF NEPHROSTOMY TUBE N/A 10/7/2019    Procedure: REPLACEMENT, NEPHROSTOMY TUBE;  Surgeon: [facility] Diagnostic Provider;  Location: [address];  Service: Anesthesiology;  Laterality: N/A;  189    REPLACEMENT OF NEPHROSTOMY TUBE N/A 11/25/2019    Procedure: REPLACEMENT, NEPHROSTOMY TUBE;  Surgeon: [facility] Diagnostic Provider;  [address];  Service: Anesthesiology;  Laterality: N/A;  Room 188/Bessy    REPLACEMENT OF NEPHROSTOMY TUBE Right 2/19/2020    Procedure: REPLACEMENT, NEPHROSTOMY TUBE removal removal;  Surgeon: Robin Boyd MD;  Location: [address];  Service: Urology;  Laterality: Right;    rt elbow surgery      S/P LAD COATED STENT  05/14/2010    6 total     S/P OM1 STENT  08/2003    SINUS SURGERY      F.E.S.S.    TRACHEOSTOMY N/A  5/2/2019    Procedure: CREATION, TRACHEOSTOMY;  Surgeon: Sean Ruano MD;  Location: [facility] OR 2ND FLR;  Service: General;  Laterality: N/A;    TUBAL LIGATION      URETERAL REIMPLANTION Left 2/19/2020    Procedure: REIMPLANTATION, URETER WITH PSOAS HITCH;  Surgeon: Robin Boyd MD;  Location: [facility] OR 2ND FLR;  Service: Urology;  Laterality: Left;       Review of patient's allergies indicates:   Allergen Reactions    Milk containing products (dairy)      Causes GI distress       Current Facility-Administered Medications on File Prior to Encounter   Medication    0.9%  NaCl infusion    fentaNYL 50 mcg/mL injection  mcg    midazolam (VERSED) 1 mg/mL injection 0.5-4 mg     Current Outpatient Medications on File Prior to Encounter   Medication Sig    ACCU-CHEK EDIN PLUS METER Misc     acetaminophen (TYLENOL) 650 MG TbSR Take 1 tablet (650 mg total) by mouth every 8 (eight) hours.    albuterol (PROVENTIL/VENTOLIN HFA) 90 mcg/actuation inhaler Inhale 2 puffs into the lungs every 6 (six) hours as needed for Wheezing.    amLODIPine (NORVASC) 10 MG tablet Take 1 tablet (10 mg total) by mouth once daily.    apixaban (ELIQUIS) 2.5 mg Tab Take 1 tablet (2.5 mg total) by mouth 2 (two) times daily.    aspirin 81 mg Tab Take 81 mg by mouth once daily. 1 Tablet Oral Every day    atorvastatin (LIPITOR) 40 MG tablet Take 1 tablet (40 mg total) by mouth every evening.    blood sugar diagnostic (ACCU-CHEK EDIN PLUS TEST STRP) Strp TEST BLOOD SUGARS 4 TIMES DAILY    celecoxib (CELEBREX) 200 MG capsule Take 1 capsule (200 mg total) by mouth once daily.    cilostazoL (PLETAL) 100 MG Tab Take 1 tablet (100 mg total) by mouth 2 (two) times daily.    dapagliflozin (FARXIGA) 10 mg tablet Take 1 tablet (10 mg total) by mouth once daily. (Patient not taking: Reported on 7/10/2023)    diclofenac sodium (VOLTAREN) 1 % Gel Apply 2 g topically 3 (three) times daily. Apply to the area of pain 2-3x per day or night as needed (Patient not  "taking: Reported on 7/10/2023)    EScitalopram oxalate (LEXAPRO) 10 MG tablet Take 1 tablet (10 mg total) by mouth once daily.    gabapentin (NEURONTIN) 300 MG capsule Take 1 capsule (300 mg) in the morning and 2 capsules (600 mg) in the evening (Patient not taking: Reported on 7/11/2023)    glipiZIDE (GLUCOTROL) 10 MG TR24 Take 1 tablet (10 mg total) by mouth daily with breakfast.    hydrALAZINE (APRESOLINE) 25 MG tablet Take 1 tablet (25 mg total) by mouth every 12 (twelve) hours.    insulin detemir U-100, Levemir, (LEVEMIR FLEXPEN) 100 unit/mL (3 mL) InPn pen Inject 14 Units into the skin every evening. (Patient taking differently: Inject into the skin every evening. Adjusted dose based on glucose)    isosorbide mononitrate (ISMO,MONOKET) 10 mg tablet Take 1 tablet (10 mg total) by mouth once daily.    metoprolol tartrate (LOPRESSOR) 50 MG tablet Take 50 mg by mouth 2 (two) times daily.    multivitamin (THERAGRAN) per tablet Take 1 tablet by mouth once daily.    oxyCODONE (ROXICODONE) 5 MG immediate release tablet Take 1 tablet (5 mg total) by mouth every 4 (four) hours as needed for Pain.    pen needle, diabetic (NOVOTWIST) 32 gauge x 1/5" Ndle Use 1 needle to inject insulin four times daily    pen needle, diabetic 32 gauge x 5/32" Ndle Use as directed     Family History       Problem Relation (Age of Onset)    Diabetes Mother, Father, Sister, Brother, Sister    Heart attack Father    Heart disease Mother    Leukemia Father    No Known Problems Sister, Brother, Brother, Maternal Grandmother, Maternal Grandfather, Paternal Grandmother, Paternal Grandfather, Son, Son, Maternal Aunt, Maternal Uncle, Paternal Aunt, Paternal Uncle          Tobacco Use    Smoking status: Never    Smokeless tobacco: Never   Substance and Sexual Activity    Alcohol use: No     Alcohol/week: 0.0 standard drinks of alcohol    Drug use: No    Sexual activity: Yes     Partners: Male     Birth control/protection: Post-menopausal     Comment: "      Review of Systems   Constitutional:  Negative for chills and fever.   HENT:  Negative for congestion and sore throat.    Respiratory:  Negative for cough and shortness of breath.    Cardiovascular:  Negative for chest pain and palpitations.   Gastrointestinal:  Positive for abdominal pain (stinging, onset same time as vomiting) and vomiting (food particles, nonbloody). Negative for blood in stool, constipation and diarrhea.   Genitourinary:  Negative for dysuria and frequency.   Musculoskeletal:  Negative for arthralgias and myalgias.   Skin:  Negative for rash and wound.   Neurological:  Negative for dizziness and syncope.     Objective:     Vital Signs (Most Recent):  Temp: 97.1 °F (36.2 °C) (08/02/23 1436)  Pulse: 73 (08/02/23 2117)  Resp: 17 (08/02/23 2117)  BP: 119/71 (08/02/23 2117)  SpO2: 100 % (08/02/23 2117) Vital Signs (24h Range):  Temp:  [97.1 °F (36.2 °C)] 97.1 °F (36.2 °C)  Pulse:  [69-81] 73  Resp:  [16-20] 17  SpO2:  [84 %-100 %] 100 %  BP: (106-140)/(56-82) 119/71     Weight: 63.5 kg (140 lb)  Body mass index is 24.03 kg/m².     Physical Exam  Vitals and nursing note reviewed.   Constitutional:       General: She is not in acute distress.     Appearance: She is ill-appearing. She is not toxic-appearing or diaphoretic.   Cardiovascular:      Rate and Rhythm: Normal rate and regular rhythm.   Pulmonary:      Effort: Pulmonary effort is normal. No respiratory distress.      Breath sounds: No wheezing.   Abdominal:      General: There is no distension.      Palpations: Abdomen is soft.      Tenderness: There is no guarding.      Comments: Nonacute abdominal exam   Musculoskeletal:      Right lower leg: No edema.      Left lower leg: No edema.   Skin:     General: Skin is warm and dry.   Neurological:      Mental Status: She is alert. Mental status is at baseline.      Cranial Nerves: No dysarthria.   Psychiatric:         Mood and Affect: Mood normal.         Behavior: Behavior normal.                 Significant Labs: All pertinent labs within the past 24 hours have been reviewed.    Significant Imaging: I have reviewed all pertinent imaging results/findings within the past 24 hours.    Assessment/Plan:     * NSTEMI (non-ST elevated myocardial infarction)  Patient with extensive CAD history and 2 prior stents to OM1 and LAD in 2003 and 2010, PAD, carotid stenosis presented with 1 day of vomiting, found to have new TWI in lateral leads, elevated troponon to 0.3>>2.2 concerning for NSTEMI.  Cannot rule out intra-abdominal process contributing to vomiting given history, but started on ACS protocol in ER with ASA, ticagrelor, and heparin drip.  Developed new 3L O2 requirement while in ER.    - cardiology consulted and discussed with them; they performed bedside TTE: stated patient had newly depressed EF and some mild MR but CVP ~3  - f/u lactate to rule out CG shock; SBP currently 110s.  - continue ACS protocol with ASA, ticagrelor, heparin drip, statin  - hold beta blocker and CCB while new O2 requirement pending lactate in case patient is entering cardiogenic shock; plan to resume once shock ruled out  - control BP with home hydralazine  - formal TTE ordered  - stat EKG for any change in symptoms      History of pancreatitis  Patient with recent (6/2023) suspected biliary pancreatitis and biliary stricture treated with biliary sphincterotomy, biliary stent, and cholecystectomy.   She had returned to baseline with minimal symptoms prior to onset of vomiting yesterday.  CT A/P upon admission showing new innumerable hepatic lesions:  1. Innumerable low attenuating hepatic lesions, new since the previous exam.  Malignancy remains a concern however given short-term development, correlation is needed to exclude multifocal infection/abscess in this patient status post bile duct stent placement and cholecystectomy.  Please note, there is a low attenuating focus within the gallbladder fossa, similar to the foci  "throughout the hepatic parenchyma..  2. Pancreatic ductal dilatation up to 4 mm, minimally increased compared to prior.  There is fullness of the pancreatic head, underlying mass is not excluded, correlation with recent ERCP advised.  3. Simple and complex bilateral renal cysts.  4. Biliary stent in place with expected pneumobilia.  5. Cholecystectomy with scattered fluid about the gallbladder fossa.  6. Moderate stool throughout the colon, may reflect developing constipation.  7.  Additional findings as above.    - given patient's new stinging abdominal pain, vomiting, and possibility of septic emboli to liver, start zosyn  - f/u blood cultures  - serial abdominal exams  - consider GI consult in AM to weight in on hepatic lesions; consider MRI liver protocol to further characterize lesions    Liver lesion  See history of pancreatitis      Anemia  Patient with recent anemia to 7-8, normocytic, in setting of pancreatitis and hospitalization.  Likely anemia of chronic inflammation  Hgb of 5 was error in the ER; stat repeat was back in 7s    - check ferritin, B12 with AM labs  - monitor CBC closely  - consider goal Hgb 8 in setting of possible ACS    Type 2 diabetes mellitus with chronic kidney disease  Patient's FSGs are controlled on current medication regimen.  Last A1c reviewed-   Lab Results   Component Value Date    HGBA1C 8.8 (H) 07/10/2023     Most recent fingerstick glucose reviewed- No results for input(s): "POCTGLUCOSE" in the last 24 hours.  Current correctional scale  Low  Maintain anti-hyperglycemic dose as follows-   Antihyperglycemics (From admission, onward)      None            - takes Farxiga, glipizide, and insulin once daily at home  - LDSSI while inpatient, diabetic diet once no longer NPO  Hold Oral hypoglycemics while patient is in the hospital.    PAD (peripheral artery disease)    - reports no longer taking cilostazol  - continue ASA and statin    (HFpEF) heart failure with preserved ejection " fraction  5/2023:  The estimated ejection fraction is 55%.  The quantitatively derived ejection fraction is 52%.  Normal right ventricular size with normal right ventricular systolic function.  Normal left ventricular diastolic function.  The left ventricle is normal in size with normal systolic function.  Normal central venous pressure (3 mmHg).     - appears dry to euvolemic on exam  - formal TTE ordered      Diabetic peripheral neuropathy associated with type 2 diabetes mellitus  See T2DM  - contineu gabapentin    Carotid stenosis, bilateral  See NSTEMI  - monitor neuro status closely      Sleep apnea  Not using CPAP at home.  Outpatient f/u and sleep medicine referral needed      CAD (coronary artery disease)  See NSTEMI for assessment and plan  Home meds include ASA 81, atorva 40, Eliquis 2.5 (for DVT but also documented as being for stents), was on cilostazol at one point but reports no longer taking      Hyperlipidemia associated with type 2 diabetes mellitus    - continue atorvastatin    Hypertension associated with diabetes  - home regimen includes amlodipine, metoprolol tartrate, and hydralazine  - per cardiology, continue amlodipine and metoprolol ONLY IF lactate is normal and patient does not show signs of CG shock; cardiology bedside echo in ER did show newly depressed EF so will be cautious.   - continue home hydralazine      VTE Risk Mitigation (From admission, onward)           Ordered     heparin 25,000 units in dextrose 5% (100 units/ml) IV bolus from bag - ADDITIONAL PRN BOLUS - 60 units/kg (max bolus 4000 units)  As needed (PRN)        Question:  Heparin Infusion Adjustment (DO NOT MODIFY ANSWER)  Answer:  [email or web address]\epic\Images\Pharmacy\HeparinInfusions\heparin LOW INTENSITY nomogram for [facility] [id number]    08/02/23 2014     heparin 25,000 units in dextrose 5% (100 units/ml) IV bolus from bag - ADDITIONAL PRN BOLUS - 30 units/kg (max bolus 4000 units)  As needed (PRN)        Question:  Heparin  Infusion Adjustment (DO NOT MODIFY ANSWER)  Answer:  [email or web address]\epic\Images\Pharmacy\HeparinInfusions\heparin LOW INTENSITY nomogram for [facility] [id number].pdf    08/02/23 2014     IP VTE HIGH RISK PATIENT  Once         08/02/23 2147     Place sequential compression device  Until discontinued         08/02/23 2147     heparin 25,000 units in dextrose 5% 250 mL (100 units/mL) infusion LOW INTENSITY nomogram - [facility]  Continuous        Question Answer Comment   Heparin Infusion Adjustment (DO NOT MODIFY ANSWER) [email or web address]\epic\Images\Pharmacy\HeparinInfusions\heparin LOW INTENSITY nomogram for [facility] [id number].pdf    Begin at (in units/kg/hr) 12        08/02/23 2014                               Karla Amin MD  Department of Hospital Medicine  [facility] - Cardiology Stepdown

## 2023-08-03 NOTE — ASSESSMENT & PLAN NOTE
See NSTEMI for assessment and plan  Home meds include ASA 81, atorva 40, Eliquis 2.5 (for DVT but also documented as being for stents), was on cilostazol at one point but reports no longer taking

## 2023-08-03 NOTE — ASSESSMENT & PLAN NOTE
Patient with extensive CAD history and 2 prior stents to OM1 and LAD in 2003 and 2010, PAD, carotid stenosis presented with 1 day of vomiting, found to have new TWI in lateral leads, elevated troponon to 0.3>>2.2 concerning for NSTEMI.  Cannot rule out intra-abdominal process contributing to vomiting given history, but started on ACS protocol in ER with ASA, ticagrelor, and heparin drip.  Developed new 3L O2 requirement while in ER.    - cardiology consulted and discussed with them; they performed bedside TTE: stated patient had newly depressed EF and some mild MR but CVP ~3  - f/u lactate to rule out CG shock; SBP currently 110s.  - continue ACS protocol with ASA, ticagrelor, heparin drip, statin  - hold beta blocker and CCB while new O2 requirement pending lactate in case patient is entering cardiogenic shock; plan to resume once shock ruled out  - control BP with home hydralazine  - formal TTE ordered  - stat EKG for any change in symptoms

## 2023-08-03 NOTE — CARE UPDATE
0159: Patient's troponin uptrended to 14 on most recent draw. She still feels better than prior without repeat symptoms. EKG obtained to assess for dynamic changes and was unchanged from prior.   Will continue to trend symptoms and troponin closely and repeat EKG with any new symptoms.    0330: Patient reporting worsening nausea, which was her initial presenting symptom of NSTEMI. Will obtain another stat EKG to assess for changes. Continuing to trend troponin.    0347: Repeat EKG unchanged from prior. Will trend next troponin value and manage nausea. Will defer nitro at this time since SBP 90s.    0606: Lactate returned at 2.9, higher than before. Troponin still pending. Called cardiology and updated them. Will repeat lactate at 0800 and they will continue to evaluate patient.

## 2023-08-04 PROBLEM — D50.9 MICROCYTIC ANEMIA: Status: ACTIVE | Noted: 2023-08-02

## 2023-08-04 LAB
AFP SERPL-MCNC: <2 NG/ML (ref 0–8.4)
ALBUMIN SERPL BCP-MCNC: 1.8 G/DL (ref 3.5–5.2)
ALLENS TEST: ABNORMAL
ALLENS TEST: ABNORMAL
ALP SERPL-CCNC: 706 U/L (ref 55–135)
ALT SERPL W/O P-5'-P-CCNC: 23 U/L (ref 10–44)
ANION GAP SERPL CALC-SCNC: 10 MMOL/L (ref 8–16)
ANION GAP SERPL CALC-SCNC: 13 MMOL/L (ref 8–16)
APTT PPP: 27.3 SEC (ref 21–32)
AST SERPL-CCNC: 100 U/L (ref 10–40)
BASOPHILS # BLD AUTO: 0.03 K/UL (ref 0–0.2)
BASOPHILS NFR BLD: 0.2 % (ref 0–1.9)
BILIRUB SERPL-MCNC: 1 MG/DL (ref 0.1–1)
BUN SERPL-MCNC: 17 MG/DL (ref 8–23)
BUN SERPL-MCNC: 17 MG/DL (ref 8–23)
CALCIUM SERPL-MCNC: 8.5 MG/DL (ref 8.7–10.5)
CALCIUM SERPL-MCNC: 8.5 MG/DL (ref 8.7–10.5)
CANCER AG19-9 SERPL-ACNC: <2.1 U/ML (ref 0–40)
CEA SERPL-MCNC: 1.9 NG/ML (ref 0–5)
CHLORIDE SERPL-SCNC: 102 MMOL/L (ref 95–110)
CHLORIDE SERPL-SCNC: 98 MMOL/L (ref 95–110)
CO2 SERPL-SCNC: 27 MMOL/L (ref 23–29)
CO2 SERPL-SCNC: 27 MMOL/L (ref 23–29)
CREAT SERPL-MCNC: 1 MG/DL (ref 0.5–1.4)
CREAT SERPL-MCNC: 1.1 MG/DL (ref 0.5–1.4)
DELSYS: ABNORMAL
DELSYS: ABNORMAL
DIFFERENTIAL METHOD: ABNORMAL
EOSINOPHIL # BLD AUTO: 0.1 K/UL (ref 0–0.5)
EOSINOPHIL # BLD AUTO: 0.1 K/UL (ref 0–0.5)
EOSINOPHIL # BLD AUTO: 0.2 K/UL (ref 0–0.5)
EOSINOPHIL NFR BLD: 0.9 % (ref 0–8)
EOSINOPHIL NFR BLD: 0.9 % (ref 0–8)
EOSINOPHIL NFR BLD: 1.7 % (ref 0–8)
ERYTHROCYTE [DISTWIDTH] IN BLOOD BY AUTOMATED COUNT: 16.2 % (ref 11.5–14.5)
ERYTHROCYTE [DISTWIDTH] IN BLOOD BY AUTOMATED COUNT: 16.6 % (ref 11.5–14.5)
ERYTHROCYTE [DISTWIDTH] IN BLOOD BY AUTOMATED COUNT: 16.6 % (ref 11.5–14.5)
EST. GFR  (NO RACE VARIABLE): 56.8 ML/MIN/1.73 M^2
EST. GFR  (NO RACE VARIABLE): >60 ML/MIN/1.73 M^2
FERRITIN SERPL-MCNC: 1506 NG/ML (ref 20–300)
FERRITIN SERPL-MCNC: 1506 NG/ML (ref 20–300)
GLUCOSE SERPL-MCNC: 129 MG/DL (ref 70–110)
GLUCOSE SERPL-MCNC: 155 MG/DL (ref 70–110)
HCO3 UR-SCNC: 31.2 MMOL/L (ref 24–28)
HCT VFR BLD AUTO: 21.4 % (ref 37–48.5)
HCT VFR BLD AUTO: 21.4 % (ref 37–48.5)
HCT VFR BLD AUTO: 23.3 % (ref 37–48.5)
HCT VFR BLD CALC: 21 %PCV (ref 36–54)
HGB BLD-MCNC: 6.8 G/DL (ref 12–16)
HGB BLD-MCNC: 6.8 G/DL (ref 12–16)
HGB BLD-MCNC: 6.9 G/DL (ref 12–16)
IMM GRANULOCYTES # BLD AUTO: 0.04 K/UL (ref 0–0.04)
IMM GRANULOCYTES NFR BLD AUTO: 0.3 % (ref 0–0.5)
IRON SERPL-MCNC: 14 UG/DL (ref 30–160)
IRON SERPL-MCNC: 14 UG/DL (ref 30–160)
LACTATE SERPL-SCNC: 1.6 MMOL/L (ref 0.5–2.2)
LDH SERPL L TO P-CCNC: 1.54 MMOL/L (ref 0.36–1.25)
LYMPHOCYTES # BLD AUTO: 1.9 K/UL (ref 1–4.8)
LYMPHOCYTES # BLD AUTO: 2.2 K/UL (ref 1–4.8)
LYMPHOCYTES # BLD AUTO: 2.2 K/UL (ref 1–4.8)
LYMPHOCYTES NFR BLD: 16 % (ref 18–48)
LYMPHOCYTES NFR BLD: 17.2 % (ref 18–48)
LYMPHOCYTES NFR BLD: 17.2 % (ref 18–48)
MAGNESIUM SERPL-MCNC: 1.9 MG/DL (ref 1.6–2.6)
MAGNESIUM SERPL-MCNC: 1.9 MG/DL (ref 1.6–2.6)
MCH RBC QN AUTO: 24.1 PG (ref 27–31)
MCH RBC QN AUTO: 24.9 PG (ref 27–31)
MCH RBC QN AUTO: 24.9 PG (ref 27–31)
MCHC RBC AUTO-ENTMCNC: 29.6 G/DL (ref 32–36)
MCHC RBC AUTO-ENTMCNC: 31.8 G/DL (ref 32–36)
MCHC RBC AUTO-ENTMCNC: 31.8 G/DL (ref 32–36)
MCV RBC AUTO: 78 FL (ref 82–98)
MCV RBC AUTO: 78 FL (ref 82–98)
MCV RBC AUTO: 82 FL (ref 82–98)
MONOCYTES # BLD AUTO: 0.7 K/UL (ref 0.3–1)
MONOCYTES # BLD AUTO: 0.8 K/UL (ref 0.3–1)
MONOCYTES # BLD AUTO: 0.8 K/UL (ref 0.3–1)
MONOCYTES NFR BLD: 6 % (ref 4–15)
MONOCYTES NFR BLD: 6.5 % (ref 4–15)
MONOCYTES NFR BLD: 6.5 % (ref 4–15)
NEUTROPHILS # BLD AUTO: 9.2 K/UL (ref 1.8–7.7)
NEUTROPHILS # BLD AUTO: 9.6 K/UL (ref 1.8–7.7)
NEUTROPHILS # BLD AUTO: 9.6 K/UL (ref 1.8–7.7)
NEUTROPHILS NFR BLD: 74.9 % (ref 38–73)
NEUTROPHILS NFR BLD: 74.9 % (ref 38–73)
NEUTROPHILS NFR BLD: 75.8 % (ref 38–73)
NRBC BLD-RTO: 0 /100 WBC
PCO2 BLDA: 37.4 MMHG (ref 35–45)
PH SMN: 7.53 [PH] (ref 7.35–7.45)
PHOSPHATE SERPL-MCNC: 2.6 MG/DL (ref 2.7–4.5)
PLATELET # BLD AUTO: 420 K/UL (ref 150–450)
PMV BLD AUTO: 10.3 FL (ref 9.2–12.9)
PMV BLD AUTO: 10.3 FL (ref 9.2–12.9)
PMV BLD AUTO: 10.9 FL (ref 9.2–12.9)
PO2 BLDA: 51 MMHG (ref 80–100)
POC BE: 9 MMOL/L
POC IONIZED CALCIUM: 1.17 MMOL/L (ref 1.06–1.42)
POC SATURATED O2: 90 % (ref 95–100)
POC TCO2: 32 MMOL/L (ref 23–27)
POCT GLUCOSE: 119 MG/DL (ref 70–110)
POCT GLUCOSE: 157 MG/DL (ref 70–110)
POCT GLUCOSE: 198 MG/DL (ref 70–110)
POTASSIUM BLD-SCNC: 3.3 MMOL/L (ref 3.5–5.1)
POTASSIUM SERPL-SCNC: 3.3 MMOL/L (ref 3.5–5.1)
POTASSIUM SERPL-SCNC: 4.3 MMOL/L (ref 3.5–5.1)
PROT SERPL-MCNC: 6.7 G/DL (ref 6–8.4)
RBC # BLD AUTO: 2.73 M/UL (ref 4–5.4)
RBC # BLD AUTO: 2.73 M/UL (ref 4–5.4)
RBC # BLD AUTO: 2.86 M/UL (ref 4–5.4)
SAMPLE: ABNORMAL
SAMPLE: ABNORMAL
SATURATED IRON: 8 % (ref 20–50)
SATURATED IRON: 8 % (ref 20–50)
SITE: ABNORMAL
SITE: ABNORMAL
SODIUM BLD-SCNC: 140 MMOL/L (ref 136–145)
SODIUM SERPL-SCNC: 135 MMOL/L (ref 136–145)
SODIUM SERPL-SCNC: 142 MMOL/L (ref 136–145)
TOTAL IRON BINDING CAPACITY: 169 UG/DL (ref 250–450)
TOTAL IRON BINDING CAPACITY: 169 UG/DL (ref 250–450)
TRANSFERRIN SERPL-MCNC: 114 MG/DL (ref 200–375)
TRANSFERRIN SERPL-MCNC: 114 MG/DL (ref 200–375)
WBC # BLD AUTO: 12.14 K/UL (ref 3.9–12.7)
WBC # BLD AUTO: 12.82 K/UL (ref 3.9–12.7)
WBC # BLD AUTO: 12.82 K/UL (ref 3.9–12.7)

## 2023-08-04 PROCEDURE — 95720 PR EEG, W/VIDEO, CONT RECORD, I&R, >12<26 HRS: ICD-10-PCS | Mod: ,,, | Performed by: PSYCHIATRY & NEUROLOGY

## 2023-08-04 PROCEDURE — 27000221 HC OXYGEN, UP TO 24 HOURS

## 2023-08-04 PROCEDURE — 82378 CARCINOEMBRYONIC ANTIGEN: CPT | Performed by: STUDENT IN AN ORGANIZED HEALTH CARE EDUCATION/TRAINING PROGRAM

## 2023-08-04 PROCEDURE — 94761 N-INVAS EAR/PLS OXIMETRY MLT: CPT

## 2023-08-04 PROCEDURE — 27100171 HC OXYGEN HIGH FLOW UP TO 24 HOURS

## 2023-08-04 PROCEDURE — P9021 RED BLOOD CELLS UNIT: HCPCS | Performed by: STUDENT IN AN ORGANIZED HEALTH CARE EDUCATION/TRAINING PROGRAM

## 2023-08-04 PROCEDURE — 86301 IMMUNOASSAY TUMOR CA 19-9: CPT | Performed by: STUDENT IN AN ORGANIZED HEALTH CARE EDUCATION/TRAINING PROGRAM

## 2023-08-04 PROCEDURE — 99900035 HC TECH TIME PER 15 MIN (STAT)

## 2023-08-04 PROCEDURE — 20000000 HC ICU ROOM

## 2023-08-04 PROCEDURE — 27000190 HC CPAP FULL FACE MASK W/VALVE

## 2023-08-04 PROCEDURE — 82728 ASSAY OF FERRITIN: CPT | Performed by: STUDENT IN AN ORGANIZED HEALTH CARE EDUCATION/TRAINING PROGRAM

## 2023-08-04 PROCEDURE — 85025 COMPLETE CBC W/AUTO DIFF WBC: CPT | Performed by: PHYSICIAN ASSISTANT

## 2023-08-04 PROCEDURE — 36600 WITHDRAWAL OF ARTERIAL BLOOD: CPT

## 2023-08-04 PROCEDURE — 80048 BASIC METABOLIC PNL TOTAL CA: CPT | Mod: XB | Performed by: INTERNAL MEDICINE

## 2023-08-04 PROCEDURE — 99291 CRITICAL CARE FIRST HOUR: CPT | Mod: ,,, | Performed by: INTERNAL MEDICINE

## 2023-08-04 PROCEDURE — 85730 THROMBOPLASTIN TIME PARTIAL: CPT | Performed by: INTERNAL MEDICINE

## 2023-08-04 PROCEDURE — 36430 TRANSFUSION BLD/BLD COMPNT: CPT

## 2023-08-04 PROCEDURE — 63600175 PHARM REV CODE 636 W HCPCS

## 2023-08-04 PROCEDURE — 94660 CPAP INITIATION&MGMT: CPT

## 2023-08-04 PROCEDURE — 84100 ASSAY OF PHOSPHORUS: CPT | Performed by: STUDENT IN AN ORGANIZED HEALTH CARE EDUCATION/TRAINING PROGRAM

## 2023-08-04 PROCEDURE — 63600175 PHARM REV CODE 636 W HCPCS: Performed by: INTERNAL MEDICINE

## 2023-08-04 PROCEDURE — 85025 COMPLETE CBC W/AUTO DIFF WBC: CPT | Mod: 91 | Performed by: INTERNAL MEDICINE

## 2023-08-04 PROCEDURE — 95720 EEG PHY/QHP EA INCR W/VEEG: CPT | Mod: ,,, | Performed by: PSYCHIATRY & NEUROLOGY

## 2023-08-04 PROCEDURE — 83735 ASSAY OF MAGNESIUM: CPT | Mod: 91 | Performed by: INTERNAL MEDICINE

## 2023-08-04 PROCEDURE — 83735 ASSAY OF MAGNESIUM: CPT | Performed by: STUDENT IN AN ORGANIZED HEALTH CARE EDUCATION/TRAINING PROGRAM

## 2023-08-04 PROCEDURE — 63600175 PHARM REV CODE 636 W HCPCS: Performed by: STUDENT IN AN ORGANIZED HEALTH CARE EDUCATION/TRAINING PROGRAM

## 2023-08-04 PROCEDURE — 25000003 PHARM REV CODE 250: Performed by: STUDENT IN AN ORGANIZED HEALTH CARE EDUCATION/TRAINING PROGRAM

## 2023-08-04 PROCEDURE — 82803 BLOOD GASES ANY COMBINATION: CPT

## 2023-08-04 PROCEDURE — 83605 ASSAY OF LACTIC ACID: CPT | Performed by: INTERNAL MEDICINE

## 2023-08-04 PROCEDURE — 83605 ASSAY OF LACTIC ACID: CPT

## 2023-08-04 PROCEDURE — 85025 COMPLETE CBC W/AUTO DIFF WBC: CPT | Mod: 91

## 2023-08-04 PROCEDURE — 80053 COMPREHEN METABOLIC PANEL: CPT | Performed by: STUDENT IN AN ORGANIZED HEALTH CARE EDUCATION/TRAINING PROGRAM

## 2023-08-04 PROCEDURE — 95714 VEEG EA 12-26 HR UNMNTR: CPT

## 2023-08-04 PROCEDURE — 95700 EEG CONT REC W/VID EEG TECH: CPT

## 2023-08-04 PROCEDURE — 82105 ALPHA-FETOPROTEIN SERUM: CPT | Performed by: STUDENT IN AN ORGANIZED HEALTH CARE EDUCATION/TRAINING PROGRAM

## 2023-08-04 PROCEDURE — 99291 PR CRITICAL CARE, E/M 30-74 MINUTES: ICD-10-PCS | Mod: ,,, | Performed by: INTERNAL MEDICINE

## 2023-08-04 PROCEDURE — 25000003 PHARM REV CODE 250: Performed by: INTERNAL MEDICINE

## 2023-08-04 PROCEDURE — 84466 ASSAY OF TRANSFERRIN: CPT | Performed by: STUDENT IN AN ORGANIZED HEALTH CARE EDUCATION/TRAINING PROGRAM

## 2023-08-04 RX ORDER — LANOLIN ALCOHOL/MO/W.PET/CERES
400 CREAM (GRAM) TOPICAL 2 TIMES DAILY
Status: DISCONTINUED | OUTPATIENT
Start: 2023-08-04 | End: 2023-08-07 | Stop reason: HOSPADM

## 2023-08-04 RX ORDER — ENOXAPARIN SODIUM 100 MG/ML
40 INJECTION SUBCUTANEOUS EVERY 24 HOURS
Status: DISCONTINUED | OUTPATIENT
Start: 2023-08-04 | End: 2023-08-07 | Stop reason: HOSPADM

## 2023-08-04 RX ORDER — DIPHENHYDRAMINE HYDROCHLORIDE 50 MG/ML
25 INJECTION INTRAMUSCULAR; INTRAVENOUS ONCE
Status: COMPLETED | OUTPATIENT
Start: 2023-08-04 | End: 2023-08-04

## 2023-08-04 RX ORDER — POTASSIUM CHLORIDE 20 MEQ/1
40 TABLET, EXTENDED RELEASE ORAL
Status: COMPLETED | OUTPATIENT
Start: 2023-08-04 | End: 2023-08-04

## 2023-08-04 RX ORDER — HYDROCODONE BITARTRATE AND ACETAMINOPHEN 500; 5 MG/1; MG/1
TABLET ORAL
Status: DISCONTINUED | OUTPATIENT
Start: 2023-08-04 | End: 2023-08-07 | Stop reason: HOSPADM

## 2023-08-04 RX ORDER — DIPHENHYDRAMINE HYDROCHLORIDE 50 MG/ML
INJECTION INTRAMUSCULAR; INTRAVENOUS
Status: COMPLETED
Start: 2023-08-04 | End: 2023-08-04

## 2023-08-04 RX ADMIN — POTASSIUM CHLORIDE 40 MEQ: 1500 TABLET, EXTENDED RELEASE ORAL at 07:08

## 2023-08-04 RX ADMIN — DIPHENHYDRAMINE HYDROCHLORIDE 25 MG: 50 INJECTION INTRAMUSCULAR; INTRAVENOUS at 05:08

## 2023-08-04 RX ADMIN — TIROFIBAN 0.15 MCG/KG/MIN: 5 INJECTION, SOLUTION INTRAVENOUS at 02:08

## 2023-08-04 RX ADMIN — TICAGRELOR 90 MG: 90 TABLET ORAL at 08:08

## 2023-08-04 RX ADMIN — ESCITALOPRAM OXALATE 10 MG: 10 TABLET ORAL at 08:08

## 2023-08-04 RX ADMIN — PIPERACILLIN SODIUM AND TAZOBACTAM SODIUM 4.5 G: 4; .5 INJECTION, POWDER, FOR SOLUTION INTRAVENOUS at 07:08

## 2023-08-04 RX ADMIN — Medication 400 MG: at 09:08

## 2023-08-04 RX ADMIN — GABAPENTIN 300 MG: 300 CAPSULE ORAL at 08:08

## 2023-08-04 RX ADMIN — ASPIRIN 81 MG CHEWABLE TABLET 81 MG: 81 TABLET CHEWABLE at 08:08

## 2023-08-04 RX ADMIN — GABAPENTIN 300 MG: 300 CAPSULE ORAL at 09:08

## 2023-08-04 RX ADMIN — Medication 400 MG: at 05:08

## 2023-08-04 RX ADMIN — TICAGRELOR 90 MG: 90 TABLET ORAL at 09:08

## 2023-08-04 RX ADMIN — ATORVASTATIN CALCIUM 40 MG: 40 TABLET, FILM COATED ORAL at 09:08

## 2023-08-04 RX ADMIN — ENOXAPARIN SODIUM 40 MG: 40 INJECTION SUBCUTANEOUS at 05:08

## 2023-08-04 RX ADMIN — DIPHENHYDRAMINE HYDROCHLORIDE 25 MG: 50 INJECTION, SOLUTION INTRAMUSCULAR; INTRAVENOUS at 05:08

## 2023-08-04 RX ADMIN — POTASSIUM CHLORIDE 40 MEQ: 1500 TABLET, EXTENDED RELEASE ORAL at 05:08

## 2023-08-04 NOTE — CONSULTS
RD consulted for at risk of developing a pressure injury. Pt does not meet needs for increased protein/energy at this time. Recommendations provided below for best optimization of nutrition status at this time.    Recommendations   Rec'd modifying to cardiac/diabetic diet-Fluid per MD  RD following     Re-consult if any nutrition needs arise.    Thank you!

## 2023-08-04 NOTE — ASSESSMENT & PLAN NOTE
Recent (6/2023) suspected biliary pancreatitis and biliary stricture treated with biliary sphincterotomy, biliary stent, and cholecystectomy.  CT A/P with new innumerable hepatic lesions:  1. Innumerable low attenuating hepatic lesions, new since the previous exam.  Malignancy remains a concern however given short-term development, correlation is needed to exclude multifocal infection/abscess in this patient status post bile duct stent placement and cholecystectomy.  Please note, there is a low attenuating focus within the gallbladder fossa, similar to the foci throughout the hepatic parenchyma..  2. Pancreatic ductal dilatation up to 4 mm, minimally increased compared to prior.  There is fullness of the pancreatic head, underlying mass is not excluded, correlation with recent ERCP advised.  3. Simple and complex bilateral renal cysts.  4. Biliary stent in place with expected pneumobilia.  5. Cholecystectomy with scattered fluid about the gallbladder fossa.  6. Moderate stool throughout the colon, may reflect developing constipation.  7.  Additional findings as above.       - concern for lesions seen on CT A/P  - hepatology consulted, appreciate assistance  - F/u cultures  - serial abdominal exams  - F/u CT A/P pancreatic protocol  - F/u tumor markers

## 2023-08-04 NOTE — CONSULTS
Vascular access assessment completed. 18G 1 3/4 Inch PIV placed in left upper arm via ultrasound.

## 2023-08-04 NOTE — HOSPITAL COURSE
Underwent PCI on 8/3 with Ramus stenosis of 70%, ostial Cx to proximal Cx 99%, LV end diastolic pressure 15.  Decision for medical management with ASA, Brillinta, statin and tirofiban gtt for Cx lesion with consideration of XRT to Ramus pending liver work up.  Patient found to have multiple lesions in liver.  Hepatology consulted and following.  Tumor markers and CT scan ordered.  Overnight of 8/3, patient with somnolence and difficult to arouse with MAP 48-50, normal HR.  Spontaneouly awoke without intervention.  PaO2 at that time was 51 and subsequently placed on supplemental O2, unclear if arterial or venous gas.  CTH negative.  EEG ordered.  Patient with history of sleep apnea, CPAP qhs ordered (not on CPAP at home).  Stepdown to floor on 8/5.  PT/OT ordered.  Patient with concern for possible abscesses in liver requiring IR Bx.  IR would like patient to be off DAPT for 5 days prior to procedure.  ID, hepatology, and IR following and planning on outpatient follow up and IR biopsy.  Strict return precautions if starting to have systemic symptoms.  Started on GDMT and patient to follow up in clinic.

## 2023-08-04 NOTE — ASSESSMENT & PLAN NOTE
Hgb around baseline 7-8  Trend Hgb  Transfuse if Hgb <7  Iron studies with increased ferritin likely anemia of chronic disease however having inciting factor of acute blood loss with drop in Hgb to now baseline around cholecystectomy.  Starting iron supplementation

## 2023-08-04 NOTE — NURSING
Cardiac ICU Care Plan    POC reviewed with Mariann Huff and family. Questions and concerns addressed. No acute events today. Pt progressing toward goals. Will continue to monitor. See below and flowsheets for full assessment and VS info.    - awaiting CT of the abdomen  - 3 hours EEG    Neuro:  [place] Coma Scale  Best Eye Response: 4-->(E4) spontaneous  Best Motor Response: 6-->(M6) obeys commands  Best Verbal Response: 5-->(V5) oriented  [place] Coma Scale Score: 15  Assessment Qualifiers: patient not sedated/intubated  Pupil PERRLA: yes    24 hr Temp:  [96.8 °F (36 °C)-98.3 °F (36.8 °C)]      CV:  Rhythm: normal sinus rhythm   DVT prophylaxis:                              Pulses  Right Radial Pulse: 1+ (weak)  Left Radial Pulse: 1+ (weak)  Right Dorsalis Pedis Pulse: 1+ (weak)  Left Dorsalis Pedis Pulse: 1+ (weak)  Right Posterior Tibial Pulse: 1+ (weak)  Left Posterior Tibial Pulse: 1+ (weak)    Resp:  Flow (L/min): 4       GI/:  GI prophylaxis: no  Diet/Nutrition Received: 2 gram sodium, no added salt  Last Bowel Movement: 08/02/23      Intake/Output Summary (Last 24 hours) at 8/4/2023 0631  Last data filed at 8/3/2023 2101  Gross per 24 hour   Intake 2491.25 ml   Output --   Net 2491.25 ml            Labs/Accuchecks:  Recent Labs   Lab 08/02/23 2138 08/03/23 0404 08/04/23 0213 08/04/23 0217   WBC 10.15 10.61  10.61  --  12.82*  12.82*   RBC 2.94* 3.30*  3.30*  --  2.73*  2.73*   HGB 7.3* 8.0*  8.0*  --  6.8*  6.8*   HCT 24.4* 28.5*  28.5* 21* 21.4*  21.4*   * 395  395  --  420  420      Recent Labs   Lab 08/02/23 2042 08/03/23  0404 08/04/23 0217   INR 1.2  --   --    APTT 27.0 42.5* 27.3      Recent Labs     08/04/23  0217      K 3.3*   CO2 27      BUN 17   CREATININE 1.0   ALKPHOS 706*   ALT 23   *   BILITOT 1.0       Recent Labs   Lab 08/02/23  1932 08/02/23  2339 08/03/23  0403   TROPONINI 2.230* 14.519* 29.578*      Recent Labs     08/04/23  0213   PH 7.529*    PCO2 37.4   PO2 51*   HCO3 31.2*   POCSATURATED 90*   BE 9       Electrolytes: Electrolytes replaced  Accuchecks: Q6H    Gtts/LDAs:   tirofiban-0.9% sodium chloride 12.5 mg/250ml 0.15 mcg/kg/min (08/03/23 0932)       Lines/Drains/Airways       Peripheral Intravenous Line  Duration                  Peripheral IV - Single Lumen 08/02/23 1747 22 G Posterior;Right Hand 1 day         Peripheral IV - Single Lumen 08/02/23 2211 20 G Anterior;Proximal;Right Forearm 1 day         Peripheral IV - Single Lumen 08/03/23 1927 18 G;1 3/4 in Anterior;Left Upper Arm <1 day                    Skin/Wounds  Bathing/Skin Care: bath, complete (08/03/23 1901)  Wounds: No  Wound care consulted: No

## 2023-08-04 NOTE — CARE UPDATE
Patient was not seen by myself as she went for heart cath around 8am and immediately stepped-up to cardiac ICU.         Ayala Schofield MD  Department of Hospital Medicine  Department of Pediatrics  8/3/2023

## 2023-08-04 NOTE — NURSING
Nurses Note -- 4 Eyes      8/4/2023   10:16 AM      Skin assessed during: Daily Assessment      [x] No Altered Skin Integrity Present    [x]Prevention Measures Documented      [] Yes- Altered Skin Integrity Present or Discovered   [] LDA Added if Not in Epic (Describe Wound)   [] New Altered Skin Integrity was Present on Admit and Documented in LDA   [] Wound Image Taken    Wound Care Consulted? No    Attending Nurse:  Arianna Norris RN    Second RN/Staff Member: Geneva Anton RN

## 2023-08-04 NOTE — ASSESSMENT & PLAN NOTE
Overnight of 8/3, patient with somnolence and difficult to arouse with MAP 48-50, normal HR.  Spontaneouly awoke without intervention.  PaO2 at that time was 51 and subsequently placed on supplemental O2, unclear if arterial or venous gas.  - CTH negative  - F/u EEG  Not using CPAP at home.  Outpatient f/u and sleep medicine referral needed  - CPAP qhs

## 2023-08-04 NOTE — PROGRESS NOTES
[facility] - Cardiac Intensive Care  Cardiology  Progress Note    Patient Name: Mariann Huff  MRN: [id number]  Admission Date: 8/2/2023  Hospital Length of Stay: 2 days  Code Status: Full Code   Attending Physician: Miguel Vora MD   Primary Care Physician: Jasbir Haney MD  Expected Discharge Date: 8/7/2023  Principal Problem:NSTEMI (non-ST elevated myocardial infarction)    Subjective:     Hospital Course:   Underwent PCI on 8/3 with Ramus stenosis of 70%, ostial Cx to proximal Cx 99%, LV end diastolic pressure 15.  Decision for medical management with ASA, Brillinta, statin and tirofiban gtt for Cx lesion with consideration of XRT to Ramus pending liver work up.  Patient found to have multiple lesions in liver.  Hepatology consulted and following.  Tumor markers and CT scan ordered.  Overnight of 8/3, patient with somnolence and difficult to arouse with MAP 48-50, normal HR.  Spontaneouly awoke without intervention.  PaO2 at that time was 51 and subsequently placed on supplemental O2, unclear if arterial or venous gas.  CTH negative.  EEG ordered.  Patient with history of sleep apnea, CPAP qhs ordered (not on CPAP at home).      Interval History: Overnight of 8/3, patient with somnolence and difficult to arouse with MAP 48-50, normal HR.  Spontaneouly awoke without intervention.  PaO2 at that time was 51 and subsequently placed on supplemental O2, unclear if arterial or venous gas.  CTH negative.  EEG ordered.  Patient with history of sleep apnea, CPAP qhs ordered (not on CPAP at home).    Review of Systems   Constitutional: Negative for chills and fever.   HENT: Negative.     Cardiovascular: Negative.  Negative for chest pain, leg swelling and palpitations.   Respiratory: Negative.  Negative for shortness of breath.    Musculoskeletal: Negative.    Gastrointestinal: Negative.  Negative for abdominal pain, nausea and vomiting.   Genitourinary: Negative.    Neurological: Negative.      Objective:     Vital  Signs (Most Recent):  Temp: 97.9 °F (36.6 °C) (08/04/23 1105)  Pulse: 92 (08/04/23 1105)  Resp: 20 (08/04/23 1105)  BP: (!) 88/51 (08/04/23 1105)  SpO2: 99 % (08/04/23 1105) Vital Signs (24h Range):  Temp:  [97.8 °F (36.6 °C)-98.6 °F (37 °C)] 97.9 °F (36.6 °C)  Pulse:  [] 92  Resp:  [10-30] 20  SpO2:  [94 %-100 %] 99 %  BP: ()/(49-67) 88/51     Weight: 58.1 kg (128 lb)  Body mass index is 21.97 kg/m².     SpO2: 99 %         Intake/Output Summary (Last 24 hours) at 8/4/2023 1151  Last data filed at 8/4/2023 1105  Gross per 24 hour   Intake 2525.63 ml   Output 402 ml   Net 2123.63 ml       Lines/Drains/Airways       Peripheral Intravenous Line  Duration                  Peripheral IV - Single Lumen 08/02/23 1747 22 G Posterior;Right Hand 1 day         Peripheral IV - Single Lumen 08/02/23 2211 20 G Anterior;Proximal;Right Forearm 1 day         Peripheral IV - Single Lumen 08/04/23 0845 18 G;1 3/4 in Anterior;Right Upper Arm <1 day                       Physical Exam  Vitals and nursing note reviewed.   Constitutional:       General: She is not in acute distress.     Appearance: She is not toxic-appearing or diaphoretic.   HENT:      Head: Normocephalic and atraumatic.      Mouth/Throat:      Mouth: Mucous membranes are moist.      Pharynx: Oropharynx is clear.   Eyes:      Extraocular Movements: Extraocular movements intact.      Conjunctiva/sclera: Conjunctivae normal.   Cardiovascular:      Rate and Rhythm: Normal rate and regular rhythm.   Pulmonary:      Effort: Pulmonary effort is normal. No respiratory distress.      Breath sounds: No wheezing.   Abdominal:      General: There is no distension.      Palpations: Abdomen is soft.      Tenderness: There is no abdominal tenderness. There is no guarding.   Musculoskeletal:         General: No tenderness. Normal range of motion.      Cervical back: Normal range of motion and neck supple.      Right lower leg: No edema.      Left lower leg: No edema.    Skin:     General: Skin is warm and dry.   Neurological:      Mental Status: She is alert and oriented to person, place, and time. Mental status is at baseline.      Cranial Nerves: No dysarthria.   Psychiatric:         Mood and Affect: Mood normal.         Behavior: Behavior normal.            Significant Labs: All pertinent lab results from the last 24 hours have been reviewed.    Significant Imaging:  All relevant imaging reviewed    Assessment and Plan:       * NSTEMI (non-ST elevated myocardial infarction)  Patient presented with concerns for NSTEMI, admitted to hospital medicine.  C on 8/3 with ramus lesion of 70%, ostial CX to Prox Cx lesion was 99% stenosed.  EF calculated to be 45%.      - Medical management with ASA/Brillinta, statin  - tirofiban gtt, with noted LCx thrombus  - Repatha on discharge  - IC following  - Consider XRT to ramus ISR at later time    Liver lesion  F/u hepatology recs and labs  - f/u imaging per hepatology    Microcytic anemia  Hgb around baseline 7-8  Trend Hgb  Transfuse if Hgb <7  - F/u iron studies    History of pancreatitis  Recent (6/2023) suspected biliary pancreatitis and biliary stricture treated with biliary sphincterotomy, biliary stent, and cholecystectomy.  CT A/P with new innumerable hepatic lesions:  1. Innumerable low attenuating hepatic lesions, new since the previous exam.  Malignancy remains a concern however given short-term development, correlation is needed to exclude multifocal infection/abscess in this patient status post bile duct stent placement and cholecystectomy.  Please note, there is a low attenuating focus within the gallbladder fossa, similar to the foci throughout the hepatic parenchyma..  2. Pancreatic ductal dilatation up to 4 mm, minimally increased compared to prior.  There is fullness of the pancreatic head, underlying mass is not excluded, correlation with recent ERCP advised.  3. Simple and complex bilateral renal cysts.  4. Biliary  stent in place with expected pneumobilia.  5. Cholecystectomy with scattered fluid about the gallbladder fossa.  6. Moderate stool throughout the colon, may reflect developing constipation.  7.  Additional findings as above.       - concern for lesions seen on CT A/P  - hepatology consulted, appreciate assistance  - F/u cultures  - serial abdominal exams  - F/u CT A/P pancreatic protocol  - F/u tumor markers    Type 2 diabetes mellitus with chronic kidney disease  -Last A1c reviewed-   Lab Results   Component Value Date    HGBA1C 8.8 (H) 07/10/2023       Home Antihyperglycemic Regiment:  -Farxiga, glipizide, insulin daily at home    Inpatient Antihyperglycemic Regiment:  Antihyperglycemics (From admission, onward)    Start     Stop Route Frequency Ordered    08/02/23 2326  insulin aspart U-100 pen 0-5 Units         -- SubQ Every 6 hours PRN 08/02/23 2227        - Titrate and addition of insulin as needed for BG goal 140-180  - SSI with POCT accuchecks ACHS and Diabetic diet 2000 cal  - Diabetic nutritional counseling given     Blood Sugars (AccuCheck):  Recent Labs     08/02/23  2303   POCTGLUCOSE 188*         PAD (peripheral artery disease)  - reports no longer taking cilostazol  - continue ASA and statin      Diabetic peripheral neuropathy associated with type 2 diabetes mellitus  Continue home gabapentin    Carotid stenosis, bilateral  See NSTEMI    Sleep apnea  Overnight of 8/3, patient with somnolence and difficult to arouse with MAP 48-50, normal HR.  Spontaneouly awoke without intervention.  PaO2 at that time was 51 and subsequently placed on supplemental O2, unclear if arterial or venous gas.  - CTH negative  - F/u EEG  Not using CPAP at home.  Outpatient f/u and sleep medicine referral needed  - CPAP qhs      CAD (coronary artery disease)  See NSTEMI for assessment and plan  Home meds include ASA 81, atorva 40, Eliquis 2.5 (for DVT but also documented as being for stents), was on cilostazol at one point but  reports no longer taking      Hyperlipidemia associated with type 2 diabetes mellitus  Continue home lipitor    Hypertension associated with diabetes  Home regimen of amlodipine, Lopressor, hydralazine        VTE Risk Mitigation (From admission, onward)         Ordered     enoxaparin injection 40 mg  Every 24 hours         08/04/23 0943     IP VTE HIGH RISK PATIENT  Once         08/02/23 2147     Place sequential compression device  Until discontinued         08/02/23 2147                Blayne Serrano MD  Cardiology  [facility] - Cardiac Intensive Care

## 2023-08-04 NOTE — TREATMENT PLAN
Hepatology Treatment Plan    Mariann Huff is a 63 y.o. female admitted to hospital 8/2/2023 (Hospital Day: 3) due to NSTEMI (non-ST elevated myocardial infarction).     Interval History  CT head done overnight given difficult to wake up this morning, no acute abnormalities on imaging. Pt tolerating solid PO diet this morning w/o problem. Denies n/v and abd pain. Denies blood in stool and melena.    Objective  Temp:  [97.8 °F (36.6 °C)-98.6 °F (37 °C)] 98.6 °F (37 °C) (08/04 0705)  Pulse:  [] 86 (08/04 0805)  BP: ()/(49-70) 118/63 (08/04 0805)  Resp:  [10-30] 24 (08/04 0805)  SpO2:  [94 %-100 %] 100 % (08/04 0805)    Laboratory    Recent Labs   Lab 08/04/23  0846   WBC 12.14   RBC 2.86*   HGB 6.9*   HCT 23.3*      MCV 82   MCH 24.1*   MCHC 29.6*      Recent Labs   Lab 08/04/23  0217   CALCIUM 8.5*   ALBUMIN 1.8*   PROT 6.7      K 3.3*   CO2 27      BUN 17   CREATININE 1.0   ALKPHOS 706*   ALT 23   *   BILITOT 1.0      Recent Labs   Lab 08/04/23  0217   APTT 27.3            Assessment and Plan    62 y/o F with hx of HFpEF, CAD, DM, biliary pancreatitis s/p sphincterotomy, biliary stent, cholecystectomy in 06/2023 presented with abdominal pain associated with vomiting, found to have NSTEMI, had LHC which showed LCx thrombus. CT AP w/ contrast showing innumerable liver lesions. Pt has been having unintentional weight loss and early satiety.    - CT pancreas protocol pending  - AFP, CA 19-9, CEA pending  - Needs screening colonoscopy which can be done outpt        Discussed with Dr. Ny  We will continue to follow.    Thank you for involving us in the care of Mariann Huff. Please call with any additional questions, concerns or changes in the patient's clinical status.    Chelsey Russell DO  Gastroenterology and Hepatology Fellow, PGY V

## 2023-08-04 NOTE — NURSING
CT scan of abdomen/pelvis ordered. CT scan room contacted, CT unable to take pt at this time. Spectra given and CT to call RN when available to take patient.      1730 Attempted to contact CT again. No answer at this time.

## 2023-08-04 NOTE — PLAN OF CARE
CICU DAILY GOALS       A: Awake    RASS: Goal - RASS Goal: 0-->alert and calm  Actual - RASS ([place] Agitation-Sedation Scale): alert and calm   Restraint necessity:    B: Breath   SBT: Not intubated   C: Coordinate A & B, analgesics/sedatives   Pain: managed    SAT: Not intubated  D: Delirium   CAM-ICU: Overall CAM-ICU: Negative  E: Early(intubated/ Progressive (non-intubated) Mobility   MOVE Screen: Pass   Activity: Activity Management: Rolling - L1  FAS: Feeding/Nutrition   Diet order: Diet/Nutrition Received: low saturated fat/low cholesterol, no added salt,   Fluid restriction:     Nutritional Supplement Intake: Quantity 0, Type:  n/a  T: Thrombus   DVT prophylaxis: VTE Required Core Measure: Pharmacological prophylaxis initiated/maintained  H: HOB Elevation   Head of Bed (HOB) Positioning: HOB elevated  U: Ulcer Prophylaxis   GI: no  G: Glucose control   managed Glycemic Management: blood glucose monitored  S: Skin   Bundle compliance: yes   Bathing/Skin Care: bath, complete Date: 8/4  B: Bowel Function   no issues   I: Indwelling Catheters   [place] necessity:     CVC necessity: No   IPAD offered: Not appropriate  D: De-escalation Antibx   Yes  Plan for the day      Pt with no seizure like activity today. EEG cap on. Blood ordered for Hgb of 6.8. See previous note regarding transfusion. Blayne Serrano MD updated on pt status throughout shift.     Family/Goals of care/Code Status   Code Status: Full Code     No acute events throughout day, VS and assessment per flow sheet, patient progressing towards goals as tolerated, plan of care reviewed with Mariann Huff and family, all concerns addressed, will continue to monitor.

## 2023-08-04 NOTE — NURSING
1710 Blood ordered and started at 75ccs/hr    1730 Rate increased to 150 ccs/hr    1750 Pt vomited 1x with a temp of 99.5. Pt reports no skin itching. Pt thinks it was the dinner she just ate. However, blood transfusion immediately stopped. Shen Reese MD at bedside. Order for 25mg IV push of Benadryl. Hematology also contacted by Shen. Per heme, okay to continue transfusion at lower rate.    1812 Transfusion restarted at 125ccs/hr.

## 2023-08-04 NOTE — NURSING
Nurses Note -- 4 Eyes      8/4/2023   6:27 AM      Skin assessed during: Daily Assessment      [x] No Altered Skin Integrity Present    []Prevention Measures Documented      [] Yes- Altered Skin Integrity Present or Discovered   [] LDA Added if Not in Epic (Describe Wound)   [] New Altered Skin Integrity was Present on Admit and Documented in LDA   [] Wound Image Taken    Wound Care Consulted? No    Attending Nurse:  Allyssa Stuart RN       Second RN/Staff Member:  Edilma Stevenson RN

## 2023-08-04 NOTE — CARE UPDATE
Transient AMS    Patient had a reading of low MAP 48 on her cuff  Went in to wake her up, patient didn't respond  Got ABG, pO2 51  After few minutes, she woke up with full responses ?transient sleep paralysis  Will get CT head to make sure no bleed with tirofiban gtt  Also, starting oxygen  Not sure what cause oxygen to be low ?undiagnosed MURTAZA  wctm

## 2023-08-04 NOTE — NURSING
1600: Pt with 400 ccs of urine this shift. Pt bladder scanned which showed 115 ccs of urine. Blayne Serrano MD notified. Orders to encourage PO intake. Will implement.

## 2023-08-04 NOTE — SUBJECTIVE & OBJECTIVE
Interval History: Overnight of 8/3, patient with somnolence and difficult to arouse with MAP 48-50, normal HR.  Spontaneouly awoke without intervention.  PaO2 at that time was 51 and subsequently placed on supplemental O2, unclear if arterial or venous gas.  CTH negative.  EEG ordered.  Patient with history of sleep apnea, CPAP qhs ordered (not on CPAP at home).    Review of Systems   Constitutional: Negative for chills and fever.   HENT: Negative.     Cardiovascular: Negative.  Negative for chest pain, leg swelling and palpitations.   Respiratory: Negative.  Negative for shortness of breath.    Musculoskeletal: Negative.    Gastrointestinal: Negative.  Negative for abdominal pain, nausea and vomiting.   Genitourinary: Negative.    Neurological: Negative.      Objective:     Vital Signs (Most Recent):  Temp: 97.9 °F (36.6 °C) (08/04/23 1105)  Pulse: 92 (08/04/23 1105)  Resp: 20 (08/04/23 1105)  BP: (!) 88/51 (08/04/23 1105)  SpO2: 99 % (08/04/23 1105) Vital Signs (24h Range):  Temp:  [97.8 °F (36.6 °C)-98.6 °F (37 °C)] 97.9 °F (36.6 °C)  Pulse:  [] 92  Resp:  [10-30] 20  SpO2:  [94 %-100 %] 99 %  BP: ()/(49-67) 88/51     Weight: 58.1 kg (128 lb)  Body mass index is 21.97 kg/m².     SpO2: 99 %         Intake/Output Summary (Last 24 hours) at 8/4/2023 1151  Last data filed at 8/4/2023 1105  Gross per 24 hour   Intake 2525.63 ml   Output 402 ml   Net 2123.63 ml       Lines/Drains/Airways       Peripheral Intravenous Line  Duration                  Peripheral IV - Single Lumen 08/02/23 1747 22 G Posterior;Right Hand 1 day         Peripheral IV - Single Lumen 08/02/23 2211 20 G Anterior;Proximal;Right Forearm 1 day         Peripheral IV - Single Lumen 08/04/23 0845 18 G;1 3/4 in Anterior;Right Upper Arm <1 day                       Physical Exam  Vitals and nursing note reviewed.   Constitutional:       General: She is not in acute distress.     Appearance: She is not toxic-appearing or diaphoretic.   HENT:       Head: Normocephalic and atraumatic.      Mouth/Throat:      Mouth: Mucous membranes are moist.      Pharynx: Oropharynx is clear.   Eyes:      Extraocular Movements: Extraocular movements intact.      Conjunctiva/sclera: Conjunctivae normal.   Cardiovascular:      Rate and Rhythm: Normal rate and regular rhythm.   Pulmonary:      Effort: Pulmonary effort is normal. No respiratory distress.      Breath sounds: No wheezing.   Abdominal:      General: There is no distension.      Palpations: Abdomen is soft.      Tenderness: There is no abdominal tenderness. There is no guarding.   Musculoskeletal:         General: No tenderness. Normal range of motion.      Cervical back: Normal range of motion and neck supple.      Right lower leg: No edema.      Left lower leg: No edema.   Skin:     General: Skin is warm and dry.   Neurological:      Mental Status: She is alert and oriented to person, place, and time. Mental status is at baseline.      Cranial Nerves: No dysarthria.   Psychiatric:         Mood and Affect: Mood normal.         Behavior: Behavior normal.            Significant Labs: All pertinent lab results from the last 24 hours have been reviewed.    Significant Imaging:  All relevant imaging reviewed

## 2023-08-05 LAB
ALBUMIN SERPL BCP-MCNC: 1.7 G/DL (ref 3.5–5.2)
ALP SERPL-CCNC: 646 U/L (ref 55–135)
ALT SERPL W/O P-5'-P-CCNC: 20 U/L (ref 10–44)
ANION GAP SERPL CALC-SCNC: 12 MMOL/L (ref 8–16)
AST SERPL-CCNC: 59 U/L (ref 10–40)
BASOPHILS # BLD AUTO: 0.04 K/UL (ref 0–0.2)
BASOPHILS NFR BLD: 0.2 % (ref 0–1.9)
BASOPHILS NFR BLD: 0.3 % (ref 0–1.9)
BASOPHILS NFR BLD: 0.3 % (ref 0–1.9)
BILIRUB SERPL-MCNC: 1.9 MG/DL (ref 0.1–1)
BNP SERPL-MCNC: 710 PG/ML (ref 0–99)
BUN SERPL-MCNC: 16 MG/DL (ref 8–23)
CALCIUM SERPL-MCNC: 8.2 MG/DL (ref 8.7–10.5)
CHLORIDE SERPL-SCNC: 103 MMOL/L (ref 95–110)
CO2 SERPL-SCNC: 23 MMOL/L (ref 23–29)
CREAT SERPL-MCNC: 0.9 MG/DL (ref 0.5–1.4)
DIFFERENTIAL METHOD: ABNORMAL
EOSINOPHIL # BLD AUTO: 0.1 K/UL (ref 0–0.5)
EOSINOPHIL NFR BLD: 0.7 % (ref 0–8)
EOSINOPHIL NFR BLD: 1 % (ref 0–8)
EOSINOPHIL NFR BLD: 1 % (ref 0–8)
ERYTHROCYTE [DISTWIDTH] IN BLOOD BY AUTOMATED COUNT: 15.9 % (ref 11.5–14.5)
ERYTHROCYTE [DISTWIDTH] IN BLOOD BY AUTOMATED COUNT: 15.9 % (ref 11.5–14.5)
ERYTHROCYTE [DISTWIDTH] IN BLOOD BY AUTOMATED COUNT: 16 % (ref 11.5–14.5)
EST. GFR  (NO RACE VARIABLE): >60 ML/MIN/1.73 M^2
GLUCOSE SERPL-MCNC: 123 MG/DL (ref 70–110)
HCT VFR BLD AUTO: 24.6 % (ref 37–48.5)
HCT VFR BLD AUTO: 24.6 % (ref 37–48.5)
HCT VFR BLD AUTO: 25.4 % (ref 37–48.5)
HGB BLD-MCNC: 7.5 G/DL (ref 12–16)
HGB BLD-MCNC: 7.5 G/DL (ref 12–16)
HGB BLD-MCNC: 7.7 G/DL (ref 12–16)
IMM GRANULOCYTES # BLD AUTO: 0.05 K/UL (ref 0–0.04)
IMM GRANULOCYTES # BLD AUTO: 0.05 K/UL (ref 0–0.04)
IMM GRANULOCYTES # BLD AUTO: 0.07 K/UL (ref 0–0.04)
IMM GRANULOCYTES NFR BLD AUTO: 0.4 % (ref 0–0.5)
LYMPHOCYTES # BLD AUTO: 2 K/UL (ref 1–4.8)
LYMPHOCYTES # BLD AUTO: 2 K/UL (ref 1–4.8)
LYMPHOCYTES # BLD AUTO: 2.1 K/UL (ref 1–4.8)
LYMPHOCYTES NFR BLD: 12.7 % (ref 18–48)
LYMPHOCYTES NFR BLD: 16.1 % (ref 18–48)
LYMPHOCYTES NFR BLD: 16.1 % (ref 18–48)
MAGNESIUM SERPL-MCNC: 2 MG/DL (ref 1.6–2.6)
MCH RBC QN AUTO: 24.8 PG (ref 27–31)
MCH RBC QN AUTO: 24.8 PG (ref 27–31)
MCH RBC QN AUTO: 25 PG (ref 27–31)
MCHC RBC AUTO-ENTMCNC: 30.3 G/DL (ref 32–36)
MCHC RBC AUTO-ENTMCNC: 30.5 G/DL (ref 32–36)
MCHC RBC AUTO-ENTMCNC: 30.5 G/DL (ref 32–36)
MCV RBC AUTO: 82 FL (ref 82–98)
MCV RBC AUTO: 82 FL (ref 82–98)
MCV RBC AUTO: 83 FL (ref 82–98)
MONOCYTES # BLD AUTO: 0.8 K/UL (ref 0.3–1)
MONOCYTES # BLD AUTO: 0.8 K/UL (ref 0.3–1)
MONOCYTES # BLD AUTO: 0.9 K/UL (ref 0.3–1)
MONOCYTES NFR BLD: 5.2 % (ref 4–15)
MONOCYTES NFR BLD: 6.2 % (ref 4–15)
MONOCYTES NFR BLD: 6.2 % (ref 4–15)
NEUTROPHILS # BLD AUTO: 13.2 K/UL (ref 1.8–7.7)
NEUTROPHILS # BLD AUTO: 9.5 K/UL (ref 1.8–7.7)
NEUTROPHILS # BLD AUTO: 9.5 K/UL (ref 1.8–7.7)
NEUTROPHILS NFR BLD: 76 % (ref 38–73)
NEUTROPHILS NFR BLD: 76 % (ref 38–73)
NEUTROPHILS NFR BLD: 80.8 % (ref 38–73)
NRBC BLD-RTO: 0 /100 WBC
PHOSPHATE SERPL-MCNC: 2.4 MG/DL (ref 2.7–4.5)
PLATELET # BLD AUTO: 348 K/UL (ref 150–450)
PLATELET # BLD AUTO: 348 K/UL (ref 150–450)
PLATELET # BLD AUTO: 395 K/UL (ref 150–450)
PMV BLD AUTO: 11.3 FL (ref 9.2–12.9)
PMV BLD AUTO: 11.3 FL (ref 9.2–12.9)
PMV BLD AUTO: 11.4 FL (ref 9.2–12.9)
POCT GLUCOSE: 142 MG/DL (ref 70–110)
POCT GLUCOSE: 196 MG/DL (ref 70–110)
POCT GLUCOSE: 204 MG/DL (ref 70–110)
POCT GLUCOSE: 214 MG/DL (ref 70–110)
POCT GLUCOSE: 281 MG/DL (ref 70–110)
POTASSIUM SERPL-SCNC: 4 MMOL/L (ref 3.5–5.1)
PROT SERPL-MCNC: 6.5 G/DL (ref 6–8.4)
RBC # BLD AUTO: 3.02 M/UL (ref 4–5.4)
RBC # BLD AUTO: 3.02 M/UL (ref 4–5.4)
RBC # BLD AUTO: 3.08 M/UL (ref 4–5.4)
SODIUM SERPL-SCNC: 138 MMOL/L (ref 136–145)
WBC # BLD AUTO: 12.52 K/UL (ref 3.9–12.7)
WBC # BLD AUTO: 12.52 K/UL (ref 3.9–12.7)
WBC # BLD AUTO: 16.4 K/UL (ref 3.9–12.7)

## 2023-08-05 PROCEDURE — 99291 PR CRITICAL CARE, E/M 30-74 MINUTES: ICD-10-PCS | Mod: ,,, | Performed by: INTERNAL MEDICINE

## 2023-08-05 PROCEDURE — 25000003 PHARM REV CODE 250: Performed by: INTERNAL MEDICINE

## 2023-08-05 PROCEDURE — 25500020 PHARM REV CODE 255: Performed by: INTERNAL MEDICINE

## 2023-08-05 PROCEDURE — 84100 ASSAY OF PHOSPHORUS: CPT | Performed by: STUDENT IN AN ORGANIZED HEALTH CARE EDUCATION/TRAINING PROGRAM

## 2023-08-05 PROCEDURE — 27100171 HC OXYGEN HIGH FLOW UP TO 24 HOURS

## 2023-08-05 PROCEDURE — 27000190 HC CPAP FULL FACE MASK W/VALVE

## 2023-08-05 PROCEDURE — 83735 ASSAY OF MAGNESIUM: CPT | Performed by: STUDENT IN AN ORGANIZED HEALTH CARE EDUCATION/TRAINING PROGRAM

## 2023-08-05 PROCEDURE — 80053 COMPREHEN METABOLIC PANEL: CPT | Performed by: STUDENT IN AN ORGANIZED HEALTH CARE EDUCATION/TRAINING PROGRAM

## 2023-08-05 PROCEDURE — 99291 CRITICAL CARE FIRST HOUR: CPT | Mod: ,,, | Performed by: INTERNAL MEDICINE

## 2023-08-05 PROCEDURE — 25000003 PHARM REV CODE 250: Performed by: STUDENT IN AN ORGANIZED HEALTH CARE EDUCATION/TRAINING PROGRAM

## 2023-08-05 PROCEDURE — 99900035 HC TECH TIME PER 15 MIN (STAT)

## 2023-08-05 PROCEDURE — 25000003 PHARM REV CODE 250

## 2023-08-05 PROCEDURE — 94761 N-INVAS EAR/PLS OXIMETRY MLT: CPT

## 2023-08-05 PROCEDURE — 63600175 PHARM REV CODE 636 W HCPCS: Performed by: INTERNAL MEDICINE

## 2023-08-05 PROCEDURE — 36415 COLL VENOUS BLD VENIPUNCTURE: CPT | Performed by: PHYSICIAN ASSISTANT

## 2023-08-05 PROCEDURE — 20600001 HC STEP DOWN PRIVATE ROOM

## 2023-08-05 PROCEDURE — 83880 ASSAY OF NATRIURETIC PEPTIDE: CPT | Performed by: PHYSICIAN ASSISTANT

## 2023-08-05 PROCEDURE — 94660 CPAP INITIATION&MGMT: CPT

## 2023-08-05 PROCEDURE — 63600175 PHARM REV CODE 636 W HCPCS

## 2023-08-05 PROCEDURE — 85025 COMPLETE CBC W/AUTO DIFF WBC: CPT | Performed by: PHYSICIAN ASSISTANT

## 2023-08-05 RX ORDER — FERROUS GLUCONATE 324(37.5)
324 TABLET ORAL
Status: DISCONTINUED | OUTPATIENT
Start: 2023-08-05 | End: 2023-08-07 | Stop reason: HOSPADM

## 2023-08-05 RX ORDER — FUROSEMIDE 10 MG/ML
40 INJECTION INTRAMUSCULAR; INTRAVENOUS ONCE
Status: COMPLETED | OUTPATIENT
Start: 2023-08-05 | End: 2023-08-05

## 2023-08-05 RX ORDER — SODIUM,POTASSIUM PHOSPHATES 280-250MG
2 POWDER IN PACKET (EA) ORAL EVERY 4 HOURS
Status: COMPLETED | OUTPATIENT
Start: 2023-08-05 | End: 2023-08-05

## 2023-08-05 RX ADMIN — ESCITALOPRAM OXALATE 10 MG: 10 TABLET ORAL at 08:08

## 2023-08-05 RX ADMIN — POTASSIUM & SODIUM PHOSPHATES POWDER PACK 280-160-250 MG 2 PACKET: 280-160-250 PACK at 02:08

## 2023-08-05 RX ADMIN — INSULIN ASPART 2 UNITS: 100 INJECTION, SOLUTION INTRAVENOUS; SUBCUTANEOUS at 11:08

## 2023-08-05 RX ADMIN — POTASSIUM & SODIUM PHOSPHATES POWDER PACK 280-160-250 MG 2 PACKET: 280-160-250 PACK at 09:08

## 2023-08-05 RX ADMIN — Medication 324 MG: at 08:08

## 2023-08-05 RX ADMIN — IOHEXOL 75 ML: 350 INJECTION, SOLUTION INTRAVENOUS at 02:08

## 2023-08-05 RX ADMIN — INSULIN ASPART 1 UNITS: 100 INJECTION, SOLUTION INTRAVENOUS; SUBCUTANEOUS at 08:08

## 2023-08-05 RX ADMIN — Medication 400 MG: at 08:08

## 2023-08-05 RX ADMIN — FUROSEMIDE 40 MG: 10 INJECTION, SOLUTION INTRAMUSCULAR; INTRAVENOUS at 12:08

## 2023-08-05 RX ADMIN — TICAGRELOR 90 MG: 90 TABLET ORAL at 08:08

## 2023-08-05 RX ADMIN — GABAPENTIN 300 MG: 300 CAPSULE ORAL at 08:08

## 2023-08-05 RX ADMIN — ATORVASTATIN CALCIUM 40 MG: 40 TABLET, FILM COATED ORAL at 08:08

## 2023-08-05 RX ADMIN — ASPIRIN 81 MG CHEWABLE TABLET 81 MG: 81 TABLET CHEWABLE at 08:08

## 2023-08-05 RX ADMIN — ENOXAPARIN SODIUM 40 MG: 40 INJECTION SUBCUTANEOUS at 04:08

## 2023-08-05 NOTE — ASSESSMENT & PLAN NOTE
Patient presented with concerns for NSTEMI, admitted to hospital medicine.  LHC on 8/3 with ramus lesion of 70%, ostial CX to Prox Cx lesion was 99% stenosed.  EF calculated to be 45%.      - Medical management with ASA/Brillinta, statin  - tirofiban gtt, with noted LCx thrombus  - Repatha on discharge  - IC following  - Consider XRT to ramus ISR at later time  - PT/OT consulted

## 2023-08-05 NOTE — PROCEDURES
ICU EEG/VIDEO MONITORING REPORT    DATE OF SERVICE: 8/4/23 - 8/5/23  EEG NUMBER: FH -1,2  REQUESTED BY: Diony  LOCATION OF SERVICE: [place] [address]    METHODOLOGY   Electroencephalographic (EEG) recording is with electrodes placed according to the International 10-20 placement system.  Thirty two (32) channels of digital signal are simultaneously recorded from the scalp and may include EKG, EMG, and/or eye monitors.   Recording band pass was 0.1 to 512 hz.  Digital video recording of the patient is simultaneously recorded with the EEG.  The nursing staff report clinical symptoms and may press an event button when the patient has symptoms of clinical interest to the treating physicians.  EEG and video recording is stored and archived in digital format.  The entire recording is visually reviewed and the times identified by computer analysis as being spikes or seizures are reviewed again.  Activation procedures which include photic stimulation, hyperventilation and instructing patients to perform simple task are done in selected patients.   Compresses spectral analysis (CSA) is also performed on the activity recorded from each individual channel.  This is displayed as a power display of frequencies from 0 to 30 Hz over time.   The CSA analysis is done and displayed continuously.  This is reviewed for asymmetries in power between homologous areas of the scalp and for presence of changes in power which canbe seen when seizures occur.  Sections of suspected abnormalities on the CSA is then compared with the original EEG recording.     [employer] software was also utilized in the review of this study.  This software suite analyzes the EEG recording in multiple domains.  Coherence and rhythmicity is computed to identify EEG sections which may contain organized seizures.  Each channel undergoes analysis to detect presence of spike and sharp waves which have special and morphological characteristic of epileptic activity.  The  routine EEG recording is converted from spacial into frequency domain.  This is then displayed comparing homologous areas to identify areas of significant asymmetry.  Algorithm to identify non-cortically generated artifact is used to separate eye movement, EMG and other artifact from the EEG.      Recording Times  Start on 8/4/23 running for 16 hrs and 1 min  Start on 8/5/23 running for 4 hrs and 43 min  Total of 20 hrs and 44 min    EEG FINDINGS    Background activity:   The background rhythm was continuous, symmetric and characterized by admixture of alpha/theta activity. Intermittent diffuse delta slowing seen. An 8 Hz posterior dominant alpha rhythm was seen with eye closure. Variable and reactive.     Sleep:  Normal sleep transients including sleep spindles, K complexes, vertex waves and POSTS were seen.    Activation procedures: not performed    Periodic and Rhythmic Activity No epileptiform discharges, periodic discharges, lateralized rhythmic delta activity or electrographic seizures were seen.    Seizures: none    EKG: EKG was normal sinus rhythm.     IMPRESSION/CLINICAL CORRELATION    This is an essentially normal EEG. Intermittent slowing seen can be related to drowsiness or very mild diffuse toxic/metabolic encephalopathy. No seizures or epileptiform discharges seen in this study.    Sulma Garduno MD  Neurocritical Care   Neurology-Epilepsy

## 2023-08-05 NOTE — PLAN OF CARE
CICU Care Plan    POC reviewed with Mariann Huff and family at 1900. Pt verbalized understanding. Questions and concerns addressed.No episodes of hypotension overnight. Required titration of O2 to CPAP overnight. MD Gotti aware of decreased UOP overnight & bladder scan result of 79mL in bladder, instructed to continue monitoring. No acute events today. Pt progressing toward goals. Will continue to monitor. See below and flowsheets for full assessment and VS info.             Is this a stroke patient? no    Neuro:  Butler Coma Scale  Best Eye Response: 4-->(E4) spontaneous  Best Motor Response: 6-->(M6) obeys commands  Best Verbal Response: 5-->(V5) oriented  Butler Coma Scale Score: 15  Assessment Qualifiers: patient not sedated/intubated, no eye obstruction present  Pupil PERRLA: yes     24 hr Temp:  [97.8 °F (36.6 °C)-99.5 °F (37.5 °C)]     CV:   Rhythm: normal sinus rhythm  BP goals:   SBP < 180  MAP > 65    Resp:      Oxygen Concentration (%): 65    Plan: N/A    GI/:     Diet/Nutrition Received: low saturated fat/low cholesterol, 2 gram sodium  Last Bowel Movement: 08/04/23  Voiding Characteristics: external catheter    Intake/Output Summary (Last 24 hours) at 8/5/2023 0439  Last data filed at 8/4/2023 2202  Gross per 24 hour   Intake 781.99 ml   Output 403 ml   Net 378.99 ml     Unmeasured Output  Urine Occurrence: 1  Nutritional Supplement Intake: Quantity N/A, Type:  N/A    Labs/Accuchecks:  Recent Labs   Lab 08/05/23 0215   WBC 12.52  12.52   RBC 3.02*  3.02*   HGB 7.5*  7.5*   HCT 24.6*  24.6*     348      Recent Labs   Lab 08/05/23 0215      K 4.0   CO2 23      BUN 16   CREATININE 0.9   ALKPHOS 646*   ALT 20   AST 59*   BILITOT 1.9*      Recent Labs   Lab 08/02/23  2042 08/03/23 0404 08/04/23 0217   INR 1.2  --   --    APTT 27.0   < > 27.3    < > = values in this interval not displayed.      Recent Labs   Lab 08/03/23 0403   TROPONINI 29.578*       Electrolytes: N/A -  electrolytes WDL  Accuchecks: ACHS    Gtts:   tirofiban-0.9% sodium chloride 12.5 mg/250ml 0.15 mcg/kg/min (08/03/23 0932)       LDA/Wounds:  Lines/Drains/Airways       Peripheral Intravenous Line  Duration                  Peripheral IV - Single Lumen 08/02/23 1747 22 G Posterior;Right Hand 2 days         Peripheral IV - Single Lumen 08/02/23 2211 20 G Anterior;Proximal;Right Forearm 2 days         Peripheral IV - Single Lumen 08/04/23 0845 18 G;1 3/4 in Anterior;Right Upper Arm <1 day                  Wounds: No  Wound care consulted: No      Problem: Adult Inpatient Plan of Care  Goal: Plan of Care Review  Outcome: Ongoing, Progressing     Problem: Diabetes Comorbidity  Goal: Blood Glucose Level Within Targeted Range  Outcome: Ongoing, Progressing     Problem: Fluid and Electrolyte Imbalance (Acute Kidney Injury/Impairment)  Goal: Fluid and Electrolyte Balance  Outcome: Ongoing, Progressing

## 2023-08-05 NOTE — SUBJECTIVE & OBJECTIVE
Interval History: Patient slept well overnight, arousable.  Bedside echo of IVC yesterday evening measuring 1cm. On CPAP.  No complaints this am.  Stepdown to floor.   Awaiting CT scan.  Likely will need 6MWT for continued O2 requirements, CXR pending.    Review of Systems   Constitutional: Negative for chills and fever.   HENT: Negative.     Cardiovascular: Negative.  Negative for chest pain, leg swelling and palpitations.   Respiratory: Negative.  Negative for cough and shortness of breath.    Musculoskeletal: Negative.    Gastrointestinal: Negative.  Negative for abdominal pain, nausea and vomiting.   Genitourinary: Negative.    Neurological: Negative.      Objective:     Vital Signs (Most Recent):  Temp: 98.7 °F (37.1 °C) (08/05/23 0800)  Pulse: 97 (08/05/23 1015)  Resp: (!) 34 (08/05/23 1015)  BP: (!) 107/56 (08/05/23 1015)  SpO2: (!) 93 % (08/05/23 1015) Vital Signs (24h Range):  Temp:  [97.8 °F (36.6 °C)-99.5 °F (37.5 °C)] 98.7 °F (37.1 °C)  Pulse:  [] 97  Resp:  [10-39] 34  SpO2:  [86 %-100 %] 93 %  BP: ()/(47-64) 107/56     Weight: 58.1 kg (128 lb)  Body mass index is 21.97 kg/m².     SpO2: (!) 93 %         Intake/Output Summary (Last 24 hours) at 8/5/2023 1106  Last data filed at 8/4/2023 2202  Gross per 24 hour   Intake 671.2 ml   Output --   Net 671.2 ml       Lines/Drains/Airways       Peripheral Intravenous Line  Duration                  Peripheral IV - Single Lumen 08/02/23 1747 22 G Posterior;Right Hand 2 days         Peripheral IV - Single Lumen 08/02/23 2211 20 G Anterior;Proximal;Right Forearm 2 days         Peripheral IV - Single Lumen 08/04/23 0845 18 G;1 3/4 in Anterior;Right Upper Arm 1 day                       Physical Exam  Vitals and nursing note reviewed.   Constitutional:       General: She is not in acute distress.     Appearance: She is not toxic-appearing or diaphoretic.   HENT:      Head: Normocephalic and atraumatic.      Mouth/Throat:      Mouth: Mucous membranes are  moist.      Pharynx: Oropharynx is clear.   Eyes:      Extraocular Movements: Extraocular movements intact.      Conjunctiva/sclera: Conjunctivae normal.   Cardiovascular:      Rate and Rhythm: Normal rate and regular rhythm.   Pulmonary:      Effort: Pulmonary effort is normal. No respiratory distress.      Breath sounds: No wheezing.   Abdominal:      General: There is no distension.      Palpations: Abdomen is soft.      Tenderness: There is no abdominal tenderness. There is no guarding.   Musculoskeletal:         General: No tenderness. Normal range of motion.      Cervical back: Normal range of motion and neck supple.      Right lower leg: No edema.      Left lower leg: No edema.   Skin:     General: Skin is warm and dry.   Neurological:      Mental Status: She is alert and oriented to person, place, and time. Mental status is at baseline.      Cranial Nerves: No dysarthria.   Psychiatric:         Mood and Affect: Mood normal.         Behavior: Behavior normal.            Significant Labs: All pertinent lab results from the last 24 hours have been reviewed.    Significant Imaging:  All relevant imaging reviewed

## 2023-08-05 NOTE — TREATMENT PLAN
Hepatology Treatment Plan    Mariann Huff is a 63 y.o. female admitted to hospital 8/2/2023 (Hospital Day: 4) due to NSTEMI (non-ST elevated myocardial infarction).     Interval History  CT abdomen triple phase not yet obtained. Tumor markers negative.    Objective  Temp:  [97.8 °F (36.6 °C)-99.5 °F (37.5 °C)] 99.4 °F (37.4 °C) (08/05 0302)  Pulse:  [] 88 (08/05 0758)  BP: ()/(47-66) 152/59 (08/05 0602)  Resp:  [14-39] 32 (08/05 0758)  SpO2:  [86 %-100 %] 93 % (08/05 0758)    Laboratory    Lab Results   Component Value Date    WBC 12.52 08/05/2023    WBC 12.52 08/05/2023    HGB 7.5 (L) 08/05/2023    HGB 7.5 (L) 08/05/2023    HCT 24.6 (L) 08/05/2023    HCT 24.6 (L) 08/05/2023    MCV 82 08/05/2023    MCV 82 08/05/2023     08/05/2023     08/05/2023       Lab Results   Component Value Date     08/05/2023    K 4.0 08/05/2023     08/05/2023    CO2 23 08/05/2023    BUN 16 08/05/2023    CREATININE 0.9 08/05/2023    CALCIUM 8.2 (L) 08/05/2023       Lab Results   Component Value Date    ALBUMIN 1.7 (L) 08/05/2023    ALT 20 08/05/2023    AST 59 (H) 08/05/2023    ALKPHOS 646 (H) 08/05/2023    BILITOT 1.9 (H) 08/05/2023       Lab Results   Component Value Date    INR 1.2 08/02/2023    INR 1.0 06/30/2023    INR 1.2 06/13/2023       MELD 3.0: 15 at 8/4/2023 11:13 AM  MELD-Na: 9 at 8/4/2023 11:13 AM  Calculated from:  Serum Creatinine: 1.1 mg/dL at 8/4/2023 11:13 AM  Serum Sodium: 135 mmol/L at 8/4/2023 11:13 AM  Total Bilirubin: 1.0 mg/dL at 8/4/2023  2:17 AM  Serum Albumin: 1.8 g/dL at 8/4/2023  2:17 AM  INR(ratio): 1.2 at 8/2/2023  8:42 PM  Age at listing (hypothetical): 63 years  Sex: Female at 8/4/2023 11:13 AM      Assessment  62 y/o F with hx of HFpEF, CAD, DM, biliary pancreatitis s/p sphincterotomy, biliary stent, cholecystectomy in 06/2023 presented with abdominal pain associated with vomiting, found to have NSTEMI, had LHC which showed LCx thrombus. CT AP w/ contrast showing  innumerable liver lesions, which are new. Tumor markers CEA, AFP and CA 19-9 are all negative.     Plan  - Follow up CT pancreatic protocol.  - No prior colonoscopy; needs screening Colonoscopy upon discharge (will schedule).    Thank you for involving us in the care of Mariann Huff. Please call with any additional concerns or questions.    Sol Gaona MD  Gastroenterology Fellow  [facility]

## 2023-08-05 NOTE — PROGRESS NOTES
"O2 reinstated after being on room at for 1 hr and placed at 6L nasal cannula; patient became nauseate and vomited. Upon reapplication of O2 at 6L patient stated "I feel better". O2 sats returned to 93%. Dr. Cisneros notified and X ray to be done.  "

## 2023-08-05 NOTE — NURSING
Patient transferred to CSU. Patient arrived to floor from CICU no evidence of distress; patient AAO x4 at this time. Patient placed on tele. Vital signs obtained. Patient voices no complaints at this time. Plan of care initiated with patient. Bed in lowest position, locked, SR up x2, call bell in reach. Will continue to monitor patient.    Nurses Note -- 4 Eyes      8/5/2023   6:06 PM      Skin assessed during: Transfer      [x] No Altered Skin Integrity Present    []Prevention Measures Documented      [] Yes- Altered Skin Integrity Present or Discovered   [] LDA Added if Not in Epic (Describe Wound)   [] New Altered Skin Integrity was Present on Admit and Documented in LDA   [] Wound Image Taken    Wound Care Consulted? No    Attending Nurse:  Emir Talbot RN/Staff Member:  SHEILA Tyler.

## 2023-08-05 NOTE — ASSESSMENT & PLAN NOTE
Recent (6/2023) suspected biliary pancreatitis and biliary stricture treated with biliary sphincterotomy, biliary stent, and cholecystectomy.  CT A/P with new innumerable hepatic lesions:  1. Innumerable low attenuating hepatic lesions, new since the previous exam.  Malignancy remains a concern however given short-term development, correlation is needed to exclude multifocal infection/abscess in this patient status post bile duct stent placement and cholecystectomy.  Please note, there is a low attenuating focus within the gallbladder fossa, similar to the foci throughout the hepatic parenchyma..  2. Pancreatic ductal dilatation up to 4 mm, minimally increased compared to prior.  There is fullness of the pancreatic head, underlying mass is not excluded, correlation with recent ERCP advised.  3. Simple and complex bilateral renal cysts.  4. Biliary stent in place with expected pneumobilia.  5. Cholecystectomy with scattered fluid about the gallbladder fossa.  6. Moderate stool throughout the colon, may reflect developing constipation.  7.  Additional findings as above.       - concern for lesions seen on CT A/P  - hepatology consulted, appreciate assistance  - serial abdominal exams  - F/u CT A/P pancreatic protocol  - Tumor markers unremarkable

## 2023-08-05 NOTE — PROGRESS NOTES
[facility] - [facility]  Cardiology  Progress Note    Patient Name: Mariann Huff  MRN: [id number]  Admission Date: 8/2/2023  Hospital Length of Stay: 3 days  Code Status: Full Code   Attending Physician: Miguel Vora MD   Primary Care Physician: Jasbir Haney MD  Expected Discharge Date: 8/5/2023  Principal Problem:NSTEMI (non-ST elevated myocardial infarction)    Subjective:     Hospital Course:   Underwent PCI on 8/3 with Ramus stenosis of 70%, ostial Cx to proximal Cx 99%, LV end diastolic pressure 15.  Decision for medical management with ASA, Brillinta, statin and tirofiban gtt for Cx lesion with consideration of XRT to Ramus pending liver work up.  Patient found to have multiple lesions in liver.  Hepatology consulted and following.  Tumor markers and CT scan ordered.  Overnight of 8/3, patient with somnolence and difficult to arouse with MAP 48-50, normal HR.  Spontaneouly awoke without intervention.  PaO2 at that time was 51 and subsequently placed on supplemental O2, unclear if arterial or venous gas.  CTH negative.  EEG ordered.  Patient with history of sleep apnea, CPAP qhs ordered (not on CPAP at home).  Stepdown to floor on 8/5.  PT/OT ordered.  Awaiting CT scan.  Likely will need 6MWT for continued O2 requirements, CXR pending.      Interval History: Patient slept well overnight, arousable.  Bedside echo of IVC yesterday evening measuring 1cm. On CPAP.  No complaints this am.  Stepdown to floor.   Awaiting CT scan.  Likely will need 6MWT for continued O2 requirements, CXR pending.    Review of Systems   Constitutional: Negative for chills and fever.   HENT: Negative.     Cardiovascular: Negative.  Negative for chest pain, leg swelling and palpitations.   Respiratory: Negative.  Negative for cough and shortness of breath.    Musculoskeletal: Negative.    Gastrointestinal: Negative.  Negative for abdominal pain, nausea and vomiting.   Genitourinary: Negative.    Neurological: Negative.       Objective:     Vital Signs (Most Recent):  Temp: 98.7 °F (37.1 °C) (08/05/23 0800)  Pulse: 97 (08/05/23 1015)  Resp: (!) 34 (08/05/23 1015)  BP: (!) 107/56 (08/05/23 1015)  SpO2: (!) 93 % (08/05/23 1015) Vital Signs (24h Range):  Temp:  [97.8 °F (36.6 °C)-99.5 °F (37.5 °C)] 98.7 °F (37.1 °C)  Pulse:  [] 97  Resp:  [10-39] 34  SpO2:  [86 %-100 %] 93 %  BP: ()/(47-64) 107/56     Weight: 58.1 kg (128 lb)  Body mass index is 21.97 kg/m².     SpO2: (!) 93 %         Intake/Output Summary (Last 24 hours) at 8/5/2023 1106  Last data filed at 8/4/2023 2202  Gross per 24 hour   Intake 671.2 ml   Output --   Net 671.2 ml       Lines/Drains/Airways       Peripheral Intravenous Line  Duration                  Peripheral IV - Single Lumen 08/02/23 1747 22 G Posterior;Right Hand 2 days         Peripheral IV - Single Lumen 08/02/23 2211 20 G Anterior;Proximal;Right Forearm 2 days         Peripheral IV - Single Lumen 08/04/23 0845 18 G;1 3/4 in Anterior;Right Upper Arm 1 day                       Physical Exam  Vitals and nursing note reviewed.   Constitutional:       General: She is not in acute distress.     Appearance: She is not toxic-appearing or diaphoretic.   HENT:      Head: Normocephalic and atraumatic.      Mouth/Throat:      Mouth: Mucous membranes are moist.      Pharynx: Oropharynx is clear.   Eyes:      Extraocular Movements: Extraocular movements intact.      Conjunctiva/sclera: Conjunctivae normal.   Cardiovascular:      Rate and Rhythm: Normal rate and regular rhythm.   Pulmonary:      Effort: Pulmonary effort is normal. No respiratory distress.      Breath sounds: No wheezing.   Abdominal:      General: There is no distension.      Palpations: Abdomen is soft.      Tenderness: There is no abdominal tenderness. There is no guarding.   Musculoskeletal:         General: No tenderness. Normal range of motion.      Cervical back: Normal range of motion and neck supple.      Right lower leg: No edema.       Left lower leg: No edema.   Skin:     General: Skin is warm and dry.   Neurological:      Mental Status: She is alert and oriented to person, place, and time. Mental status is at baseline.      Cranial Nerves: No dysarthria.   Psychiatric:         Mood and Affect: Mood normal.         Behavior: Behavior normal.            Significant Labs: All pertinent lab results from the last 24 hours have been reviewed.    Significant Imaging:  All relevant imaging reviewed    Assessment and Plan:       * NSTEMI (non-ST elevated myocardial infarction)  Patient presented with concerns for NSTEMI, admitted to hospital medicine.  [facility] on 8/3 with ramus lesion of 70%, ostial CX to Prox Cx lesion was 99% stenosed.  EF calculated to be 45%.      - Medical management with ASA/Brillinta, statin  - tirofiban gtt, with noted LCx thrombus  - Repatha on discharge  - IC following  - Consider XRT to ramus ISR at later time  - PT/OT consulted    Liver lesion  F/u hepatology recs and labs  - f/u imaging per hepatology    Anemia  Hgb around baseline 7-8  Trend Hgb  Transfuse if Hgb <7  Iron studies with increased ferritin likely anemia of chronic disease however having inciting factor of acute blood loss with drop in Hgb to now baseline around cholecystectomy.  Starting iron supplementation    History of pancreatitis  Recent (6/2023) suspected biliary pancreatitis and biliary stricture treated with biliary sphincterotomy, biliary stent, and cholecystectomy.  CT A/P with new innumerable hepatic lesions:  1. Innumerable low attenuating hepatic lesions, new since the previous exam.  Malignancy remains a concern however given short-term development, correlation is needed to exclude multifocal infection/abscess in this patient status post bile duct stent placement and cholecystectomy.  Please note, there is a low attenuating focus within the gallbladder fossa, similar to the foci throughout the hepatic parenchyma..  2. Pancreatic ductal dilatation up  to 4 mm, minimally increased compared to prior.  There is fullness of the pancreatic head, underlying mass is not excluded, correlation with recent ERCP advised.  3. Simple and complex bilateral renal cysts.  4. Biliary stent in place with expected pneumobilia.  5. Cholecystectomy with scattered fluid about the gallbladder fossa.  6. Moderate stool throughout the colon, may reflect developing constipation.  7.  Additional findings as above.       - concern for lesions seen on CT A/P  - hepatology consulted, appreciate assistance  - serial abdominal exams  - F/u CT A/P pancreatic protocol  - Tumor markers unremarkable    Type 2 diabetes mellitus with chronic kidney disease  -Last A1c reviewed-   Lab Results   Component Value Date    HGBA1C 8.8 (H) 07/10/2023       Home Antihyperglycemic Regiment:  -Farxiga, glipizide, insulin daily at home    Inpatient Antihyperglycemic Regiment:  Antihyperglycemics (From admission, onward)    Start     Stop Route Frequency Ordered    08/02/23 2326  insulin aspart U-100 pen 0-5 Units         -- SubQ Every 6 hours PRN 08/02/23 2227        - Titrate and addition of insulin as needed for BG goal 140-180  - SSI with POCT accuchecks ACHS and Diabetic diet 2000 beti  - Diabetic nutritional counseling given     Blood Sugars (AccuCheck):  Recent Labs     08/02/23  2303   POCTGLUCOSE 188*         PAD (peripheral artery disease)  - reports no longer taking cilostazol  - continue ASA and statin      Diabetic peripheral neuropathy associated with type 2 diabetes mellitus  Continue home gabapentin    Carotid stenosis, bilateral  See NSTEMI    Sleep apnea  Overnight of 8/3, patient with somnolence and difficult to arouse with MAP 48-50, normal HR.  Spontaneouly awoke without intervention.  PaO2 at that time was 51 and subsequently placed on supplemental O2, unclear if arterial or venous gas.  - CTH negative  - F/u EEG  Not using CPAP at home.  Outpatient f/u and sleep medicine referral needed  -  CPAP qhs      CAD (coronary artery disease)  See NSTEMI for assessment and plan  Home meds include ASA 81, atorva 40, Eliquis 2.5 (for DVT but also documented as being for stents), was on cilostazol at one point but reports no longer taking      Hyperlipidemia associated with type 2 diabetes mellitus  Continue home lipitor    Hypertension associated with diabetes  Home regimen of amlodipine, Lopressor, hydralazine        VTE Risk Mitigation (From admission, onward)         Ordered     enoxaparin injection 40 mg  Every 24 hours         08/04/23 0943     IP VTE HIGH RISK PATIENT  Once         08/02/23 2147     Place sequential compression device  Until discontinued         08/02/23 2147                Blayne Serrano MD  Cardiology  [facility] - Cardiac Intensive Care

## 2023-08-06 PROBLEM — I50.21 ACUTE SYSTOLIC CONGESTIVE HEART FAILURE: Status: ACTIVE | Noted: 2019-06-13

## 2023-08-06 LAB
ALBUMIN SERPL BCP-MCNC: 1.9 G/DL (ref 3.5–5.2)
ALP SERPL-CCNC: 672 U/L (ref 55–135)
ALT SERPL W/O P-5'-P-CCNC: 22 U/L (ref 10–44)
ANION GAP SERPL CALC-SCNC: 13 MMOL/L (ref 8–16)
AST SERPL-CCNC: 46 U/L (ref 10–40)
BASOPHILS # BLD AUTO: 0.02 K/UL (ref 0–0.2)
BASOPHILS NFR BLD: 0.2 % (ref 0–1.9)
BILIRUB SERPL-MCNC: 1.2 MG/DL (ref 0.1–1)
BLD PROD TYP BPU: NORMAL
BLD PROD TYP BPU: NORMAL
BLOOD UNIT EXPIRATION DATE: NORMAL
BLOOD UNIT EXPIRATION DATE: NORMAL
BLOOD UNIT TYPE CODE: 5100
BLOOD UNIT TYPE CODE: 5100
BLOOD UNIT TYPE: NORMAL
BLOOD UNIT TYPE: NORMAL
BUN SERPL-MCNC: 15 MG/DL (ref 8–23)
CALCIUM SERPL-MCNC: 8.8 MG/DL (ref 8.7–10.5)
CHLORIDE SERPL-SCNC: 101 MMOL/L (ref 95–110)
CO2 SERPL-SCNC: 25 MMOL/L (ref 23–29)
CODING SYSTEM: NORMAL
CODING SYSTEM: NORMAL
CREAT SERPL-MCNC: 0.9 MG/DL (ref 0.5–1.4)
CROSSMATCH INTERPRETATION: NORMAL
CROSSMATCH INTERPRETATION: NORMAL
DIFFERENTIAL METHOD: ABNORMAL
DISPENSE STATUS: NORMAL
DISPENSE STATUS: NORMAL
EOSINOPHIL # BLD AUTO: 0.3 K/UL (ref 0–0.5)
EOSINOPHIL NFR BLD: 2.5 % (ref 0–8)
ERYTHROCYTE [DISTWIDTH] IN BLOOD BY AUTOMATED COUNT: 16.2 % (ref 11.5–14.5)
EST. GFR  (NO RACE VARIABLE): >60 ML/MIN/1.73 M^2
GLUCOSE SERPL-MCNC: 247 MG/DL (ref 70–110)
HCT VFR BLD AUTO: 27.4 % (ref 37–48.5)
HGB BLD-MCNC: 8.5 G/DL (ref 12–16)
IMM GRANULOCYTES # BLD AUTO: 0.03 K/UL (ref 0–0.04)
IMM GRANULOCYTES NFR BLD AUTO: 0.2 % (ref 0–0.5)
LYMPHOCYTES # BLD AUTO: 1.4 K/UL (ref 1–4.8)
LYMPHOCYTES NFR BLD: 11.5 % (ref 18–48)
MAGNESIUM SERPL-MCNC: 2.1 MG/DL (ref 1.6–2.6)
MCH RBC QN AUTO: 25.5 PG (ref 27–31)
MCHC RBC AUTO-ENTMCNC: 31 G/DL (ref 32–36)
MCV RBC AUTO: 82 FL (ref 82–98)
MONOCYTES # BLD AUTO: 0.8 K/UL (ref 0.3–1)
MONOCYTES NFR BLD: 6.7 % (ref 4–15)
NEUTROPHILS # BLD AUTO: 9.6 K/UL (ref 1.8–7.7)
NEUTROPHILS NFR BLD: 78.9 % (ref 38–73)
NRBC BLD-RTO: 0 /100 WBC
PLATELET # BLD AUTO: 342 K/UL (ref 150–450)
PMV BLD AUTO: 11.5 FL (ref 9.2–12.9)
POCT GLUCOSE: 176 MG/DL (ref 70–110)
POCT GLUCOSE: 223 MG/DL (ref 70–110)
POCT GLUCOSE: 241 MG/DL (ref 70–110)
POCT GLUCOSE: 265 MG/DL (ref 70–110)
POTASSIUM SERPL-SCNC: 3.7 MMOL/L (ref 3.5–5.1)
PROT SERPL-MCNC: 7.3 G/DL (ref 6–8.4)
RBC # BLD AUTO: 3.33 M/UL (ref 4–5.4)
SODIUM SERPL-SCNC: 139 MMOL/L (ref 136–145)
TRANS ERYTHROCYTES VOL PATIENT: NORMAL ML
TRANS ERYTHROCYTES VOL PATIENT: NORMAL ML
WBC # BLD AUTO: 12.16 K/UL (ref 3.9–12.7)

## 2023-08-06 PROCEDURE — 99233 SBSQ HOSP IP/OBS HIGH 50: CPT | Mod: ,,, | Performed by: INTERNAL MEDICINE

## 2023-08-06 PROCEDURE — 36415 COLL VENOUS BLD VENIPUNCTURE: CPT | Performed by: INTERNAL MEDICINE

## 2023-08-06 PROCEDURE — 20600001 HC STEP DOWN PRIVATE ROOM

## 2023-08-06 PROCEDURE — 25000003 PHARM REV CODE 250

## 2023-08-06 PROCEDURE — 85025 COMPLETE CBC W/AUTO DIFF WBC: CPT | Performed by: INTERNAL MEDICINE

## 2023-08-06 PROCEDURE — 99233 PR SUBSEQUENT HOSPITAL CARE,LEVL III: ICD-10-PCS | Mod: ,,, | Performed by: INTERNAL MEDICINE

## 2023-08-06 PROCEDURE — 63600175 PHARM REV CODE 636 W HCPCS: Performed by: INTERNAL MEDICINE

## 2023-08-06 PROCEDURE — 80053 COMPREHEN METABOLIC PANEL: CPT | Performed by: INTERNAL MEDICINE

## 2023-08-06 PROCEDURE — 27100171 HC OXYGEN HIGH FLOW UP TO 24 HOURS

## 2023-08-06 PROCEDURE — 94660 CPAP INITIATION&MGMT: CPT

## 2023-08-06 PROCEDURE — 99222 1ST HOSP IP/OBS MODERATE 55: CPT | Mod: ,,, | Performed by: INTERNAL MEDICINE

## 2023-08-06 PROCEDURE — 94761 N-INVAS EAR/PLS OXIMETRY MLT: CPT

## 2023-08-06 PROCEDURE — 84075 ASSAY ALKALINE PHOSPHATASE: CPT | Performed by: INTERNAL MEDICINE

## 2023-08-06 PROCEDURE — 83735 ASSAY OF MAGNESIUM: CPT | Performed by: INTERNAL MEDICINE

## 2023-08-06 PROCEDURE — 99900035 HC TECH TIME PER 15 MIN (STAT)

## 2023-08-06 PROCEDURE — 99222 PR INITIAL HOSPITAL CARE,LEVL II: ICD-10-PCS | Mod: ,,, | Performed by: INTERNAL MEDICINE

## 2023-08-06 PROCEDURE — 63600175 PHARM REV CODE 636 W HCPCS: Performed by: STUDENT IN AN ORGANIZED HEALTH CARE EDUCATION/TRAINING PROGRAM

## 2023-08-06 PROCEDURE — 25000003 PHARM REV CODE 250: Performed by: STUDENT IN AN ORGANIZED HEALTH CARE EDUCATION/TRAINING PROGRAM

## 2023-08-06 PROCEDURE — 25000003 PHARM REV CODE 250: Performed by: INTERNAL MEDICINE

## 2023-08-06 RX ORDER — POTASSIUM CHLORIDE 20 MEQ/1
40 TABLET, EXTENDED RELEASE ORAL ONCE
Status: COMPLETED | OUTPATIENT
Start: 2023-08-06 | End: 2023-08-06

## 2023-08-06 RX ORDER — FUROSEMIDE 10 MG/ML
20 INJECTION INTRAMUSCULAR; INTRAVENOUS ONCE
Status: COMPLETED | OUTPATIENT
Start: 2023-08-06 | End: 2023-08-06

## 2023-08-06 RX ADMIN — Medication 400 MG: at 08:08

## 2023-08-06 RX ADMIN — ASPIRIN 81 MG CHEWABLE TABLET 81 MG: 81 TABLET CHEWABLE at 08:08

## 2023-08-06 RX ADMIN — Medication 324 MG: at 08:08

## 2023-08-06 RX ADMIN — INSULIN ASPART 3 UNITS: 100 INJECTION, SOLUTION INTRAVENOUS; SUBCUTANEOUS at 12:08

## 2023-08-06 RX ADMIN — INSULIN ASPART 2 UNITS: 100 INJECTION, SOLUTION INTRAVENOUS; SUBCUTANEOUS at 04:08

## 2023-08-06 RX ADMIN — ENOXAPARIN SODIUM 40 MG: 40 INJECTION SUBCUTANEOUS at 04:08

## 2023-08-06 RX ADMIN — TICAGRELOR 90 MG: 90 TABLET ORAL at 08:08

## 2023-08-06 RX ADMIN — ATORVASTATIN CALCIUM 40 MG: 40 TABLET, FILM COATED ORAL at 08:08

## 2023-08-06 RX ADMIN — GABAPENTIN 300 MG: 300 CAPSULE ORAL at 08:08

## 2023-08-06 RX ADMIN — ESCITALOPRAM OXALATE 10 MG: 10 TABLET ORAL at 08:08

## 2023-08-06 RX ADMIN — FUROSEMIDE 20 MG: 10 INJECTION, SOLUTION INTRAMUSCULAR; INTRAVENOUS at 10:08

## 2023-08-06 RX ADMIN — POTASSIUM CHLORIDE 40 MEQ: 1500 TABLET, EXTENDED RELEASE ORAL at 10:08

## 2023-08-06 NOTE — ASSESSMENT & PLAN NOTE
-rev'd CT scan reports and d/w hepatology who recommend biopsy inpatient vs outpatient  -consulted ID, awaiting ID input, if ID is also ok with biopsy outpatient can set it up outpatient   -tried to contact IR but no success yet due to weekend, will make patient NPO midnight but will continue DAPT given recent ACS

## 2023-08-06 NOTE — HPI
"63F with h/o CAD , HTN, HLD, DM2, MURTAZA,  biliary pancreatitis s/p sphincterotomy, biliary stent, cholecystectomy in 06/2023, s/p ORIF of her left proximal humerus fracture with IM nail  7/12 re-admitted 8/2 with N/V found to have NSTEMI. Reports feeling better than admit. Denies f/c. Denies abd pain. Doesn't recall being on abx for a while. Reports L arm doing well since surgery, incision healing.     Note-  previously followed by ID in 2019 (new to me) for E coli bacteremia and Staph lugdenesis infection (abdominal source) with plan for subsequent suppressive therapy given concern for hardware involvement. Also treated in past for HAP (Pseudomonas) and candida glabrata UTI . Had a persistent fluid collection along inferior aspect of midline abdominal incision with surrounding inflammatory changes, IR aspiration of collection almost completely drained with few WBCs and cultures NGTD, treated with meropenem and then took cefadroxil/rifampin for unclear period of time      S/p LHC on 8/3 with multivessel disease with no significant stenosis and plan for medical management without stents placed.        Ct  Small collection near the cholecystectomy bed adjacent to the surgical clips measuring 1.3 cm (series 302, image 29).  Additional small collection, possibly contiguous or separate along the inferomedial right lobe measuring 2.1 x 1.4 cm (series 302, image 36).     Bile Ducts: Biliary stent in place with trace pneumobilia.  Some prominence of central intrahepatic ducts at the confluence with thin wall enhancement.     Spleen: Unremarkable.     Pancreas: Redemonstration of heterogeneity and fullness at the pancreatic head with area in total measuring 3.6 x 2.0 cm (series 301, image 67).  Mild dilatation of the immediate upstream pancreatic duct up to 5 mm    Afebrile    Received 3 days zosyn    ID consulted for "multiple hepatic abscesses in liver which initially were though to be mets, advice antibiotic plan " "please"    "

## 2023-08-06 NOTE — ASSESSMENT & PLAN NOTE
Patient presented with concerns for NSTEMI, admitted to hospital medicine.  [facility] on 8/3 with ramus lesion of 70%, ostial CX to Prox Cx lesion was 99% stenosed.  EF calculated to be 45%.      - Medical management with ASA/Brillinta, statin  - tirofiban gtt was used x 18h, with noted LCx thrombus  - Repatha on discharge  - IC following  - Consider XRT to ramus ISR at later time  - PT/OT consulted

## 2023-08-06 NOTE — PROGRESS NOTES
[facility] - [facility]  Cardiology  Progress Note    Patient Name: Mariann Huff  MRN: [id number]  Admission Date: 8/2/2023  Hospital Length of Stay: 4 days  Code Status: Full Code   Attending Physician: Miguel Vora MD   Primary Care Physician: Jasbir Haney MD  Expected Discharge Date: 8/5/2023  Principal Problem:NSTEMI (non-ST elevated myocardial infarction)    Subjective:     Interval History:   -awaiting PT to see her to gauge her strength and guide for home vs rehab disposition, PT was consulted Saturday  -rev'd CT scan reports and d/w hepatology who recommend biopsy inpatient vs outpatient  -consulted ID, awaiting ID input, if ID is also ok with biopsy outpatient can set it up outpatient   -tried to contact IR but no success yet due to weekend, will make patient NPO midnight but will continue DAPT given recent ACS  -another dose of IV lasix 40 today  -still hypoxic needing oxygen, 5 lpm this am    Review of Systems   Constitutional: Negative for chills and fever.   HENT: Negative.     Cardiovascular: Negative.  Negative for chest pain, leg swelling and palpitations.   Respiratory: Negative.  Negative for cough and shortness of breath.    Musculoskeletal: Negative.    Gastrointestinal: Negative.  Negative for abdominal pain, nausea and vomiting.   Genitourinary: Negative.    Neurological: Negative.      Objective:     Vital Signs (Most Recent):  Temp: 97.7 °F (36.5 °C) (08/06/23 0725)  Pulse: 77 (08/06/23 0725)  Resp: 17 (08/06/23 0725)  BP: 119/61 (08/06/23 0725)  SpO2: 100 % (08/06/23 0725) Vital Signs (24h Range):  Temp:  [96.4 °F (35.8 °C)-98.5 °F (36.9 °C)] 97.7 °F (36.5 °C)  Pulse:  [] 77  Resp:  [17-32] 17  SpO2:  [86 %-100 %] 100 %  BP: (101-134)/(53-86) 119/61     Weight: 58.1 kg (128 lb)  Body mass index is 21.97 kg/m².     SpO2: 100 %         Intake/Output Summary (Last 24 hours) at 8/6/2023 1142  Last data filed at 8/5/2023 2000  Gross per 24 hour   Intake 480 ml   Output 450 ml    Net 30 ml         Lines/Drains/Airways       Peripheral Intravenous Line  Duration                  Peripheral IV - Single Lumen 08/02/23 0000 20 G Anterior;Proximal;Right Forearm 4 days         Peripheral IV - Single Lumen 08/02/23 1747 22 G Posterior;Right Hand 3 days         Peripheral IV - Single Lumen 08/04/23 0845 18 G;1 3/4 in Anterior;Right Upper Arm 2 days                       Physical Exam  Vitals and nursing note reviewed.   Constitutional:       General: She is not in acute distress.     Appearance: She is not toxic-appearing or diaphoretic.   HENT:      Head: Normocephalic and atraumatic.      Mouth/Throat:      Mouth: Mucous membranes are moist.      Pharynx: Oropharynx is clear.   Eyes:      Extraocular Movements: Extraocular movements intact.      Conjunctiva/sclera: Conjunctivae normal.   Cardiovascular:      Rate and Rhythm: Normal rate and regular rhythm.   Pulmonary:      Effort: Pulmonary effort is normal. No respiratory distress.      Breath sounds: No wheezing.   Abdominal:      General: There is no distension.      Palpations: Abdomen is soft.      Tenderness: There is no abdominal tenderness. There is no guarding.   Musculoskeletal:         General: No tenderness. Normal range of motion.      Cervical back: Normal range of motion and neck supple.      Right lower leg: No edema.      Left lower leg: No edema.   Skin:     General: Skin is warm and dry.   Neurological:      Mental Status: She is alert and oriented to person, place, and time. Mental status is at baseline.      Cranial Nerves: No dysarthria.   Psychiatric:         Mood and Affect: Mood normal.         Behavior: Behavior normal.            Significant Labs: All pertinent lab results from the last 24 hours have been reviewed.    Significant Imaging:  All relevant imaging reviewed    Assessment and Plan:       * NSTEMI (non-ST elevated myocardial infarction)  Patient presented with concerns for NSTEMI, admitted to hospital  medicine.  [facility] on 8/3 with ramus lesion of 70%, ostial CX to Prox Cx lesion was 99% stenosed.  EF calculated to be 45%.      - Medical management with ASA/Brillinta, statin  - tirofiban gtt was used x 18h, with noted LCx thrombus  - Repatha on discharge  - IC following  - Consider XRT to ramus ISR at later time  - PT/OT consulted    Liver lesion  -rev'd CT scan reports and d/w hepatology who recommend biopsy inpatient vs outpatient  -consulted ID, awaiting ID input, if ID is also ok with biopsy outpatient can set it up outpatient   -tried to contact IR but no success yet due to weekend, will make patient NPO midnight but will continue DAPT given recent ACS    Anemia  Hgb around baseline 7-8  Trend Hgb  Transfuse if Hgb <7  Iron studies with increased ferritin likely anemia of chronic disease however having inciting factor of acute blood loss with drop in Hgb to now baseline around cholecystectomy.  Starting iron supplementation    History of pancreatitis  Recent (6/2023) suspected biliary pancreatitis and biliary stricture treated with biliary sphincterotomy, biliary stent, and cholecystectomy.  CT A/P with new innumerable hepatic lesions:  1. Innumerable low attenuating hepatic lesions, new since the previous exam.  Malignancy remains a concern however given short-term development, correlation is needed to exclude multifocal infection/abscess in this patient status post bile duct stent placement and cholecystectomy.  Please note, there is a low attenuating focus within the gallbladder fossa, similar to the foci throughout the hepatic parenchyma..  2. Pancreatic ductal dilatation up to 4 mm, minimally increased compared to prior.  There is fullness of the pancreatic head, underlying mass is not excluded, correlation with recent ERCP advised.  3. Simple and complex bilateral renal cysts.  4. Biliary stent in place with expected pneumobilia.  5. Cholecystectomy with scattered fluid about the gallbladder fossa.  6.  Moderate stool throughout the colon, may reflect developing constipation.  7.  Additional findings as above.       - concern for lesions seen on CT A/P  - hepatology consulted, appreciate assistance  - serial abdominal exams  - F/u CT A/P pancreatic protocol  - Tumor markers unremarkable    Type 2 diabetes mellitus with chronic kidney disease  -Last A1c reviewed-   Lab Results   Component Value Date    HGBA1C 8.8 (H) 07/10/2023       Home Antihyperglycemic Regiment:  -Farxiga, glipizide, insulin daily at home    Inpatient Antihyperglycemic Regiment:  Antihyperglycemics (From admission, onward)    Start     Stop Route Frequency Ordered    08/02/23 2326  insulin aspart U-100 pen 0-5 Units         -- SubQ Every 6 hours PRN 08/02/23 2227        - Titrate and addition of insulin as needed for BG goal 140-180  - SSI with POCT accuchecks ACHS and Diabetic diet 2000 cal  - Diabetic nutritional counseling given     Blood Sugars (AccuCheck):  Recent Labs     08/02/23  2303   POCTGLUCOSE 188*         PAD (peripheral artery disease)  - reports no longer taking cilostazol  - continue ASA and statin      Acute systolic congestive heart failure  CXR with B/L edema    EF decreased to 40-45%  Another dose of IV lasix today, likely would need PO lasix on discharge     Diabetic peripheral neuropathy associated with type 2 diabetes mellitus  Continue home gabapentin    Carotid stenosis, bilateral  See NSTEMI    Sleep apnea  Overnight of 8/3, patient with somnolence and difficult to arouse with MAP 48-50, normal HR.  Spontaneouly awoke without intervention.  PaO2 at that time was 51 and subsequently placed on supplemental O2, unclear if arterial or venous gas.  - CTH negative  - F/u EEG  Not using CPAP at home.  Outpatient f/u and sleep medicine referral needed  - CPAP qhs      CAD (coronary artery disease)  See NSTEMI for assessment and plan  Home meds include ASA 81, atorva 40, Eliquis 2.5 (for DVT but also documented as being  for stents), was on cilostazol at one point but reports no longer taking      Hyperlipidemia associated with type 2 diabetes mellitus  Continue home lipitor    Hypertension associated with diabetes  Home regimen of amlodipine, Lopressor, hydralazine      VTE Risk Mitigation (From admission, onward)         Ordered     enoxaparin injection 40 mg  Every 24 hours         08/04/23 0943     IP VTE HIGH RISK PATIENT  Once         08/02/23 2147     Place sequential compression device  Until discontinued         08/02/23 2147                Shen Reese MD  Cardiology  [facility] - Cardiology Stepdown

## 2023-08-06 NOTE — SUBJECTIVE & OBJECTIVE
Interval History:   -awaiting PT to see her to gauge her strength and guide for home vs rehab disposition, PT was consulted Saturday  -rev'd CT scan reports and d/w hepatology who recommend biopsy inpatient vs outpatient  -consulted ID, awaiting ID input, if ID is also ok with biopsy outpatient can set it up outpatient   -tried to contact IR but no success yet due to weekend, will make patient NPO midnight but will continue DAPT given recent ACS  -another dose of IV lasix 40 today    Review of Systems   Constitutional: Negative for chills and fever.   HENT: Negative.     Cardiovascular: Negative.  Negative for chest pain, leg swelling and palpitations.   Respiratory: Negative.  Negative for cough and shortness of breath.    Musculoskeletal: Negative.    Gastrointestinal: Negative.  Negative for abdominal pain, nausea and vomiting.   Genitourinary: Negative.    Neurological: Negative.      Objective:     Vital Signs (Most Recent):  Temp: 97.7 °F (36.5 °C) (08/06/23 0725)  Pulse: 77 (08/06/23 0725)  Resp: 17 (08/06/23 0725)  BP: 119/61 (08/06/23 0725)  SpO2: 100 % (08/06/23 0725) Vital Signs (24h Range):  Temp:  [96.4 °F (35.8 °C)-98.5 °F (36.9 °C)] 97.7 °F (36.5 °C)  Pulse:  [] 77  Resp:  [17-32] 17  SpO2:  [86 %-100 %] 100 %  BP: (101-134)/(53-86) 119/61     Weight: 58.1 kg (128 lb)  Body mass index is 21.97 kg/m².     SpO2: 100 %         Intake/Output Summary (Last 24 hours) at 8/6/2023 1142  Last data filed at 8/5/2023 2000  Gross per 24 hour   Intake 480 ml   Output 450 ml   Net 30 ml         Lines/Drains/Airways       Peripheral Intravenous Line  Duration                  Peripheral IV - Single Lumen 08/02/23 0000 20 G Anterior;Proximal;Right Forearm 4 days         Peripheral IV - Single Lumen 08/02/23 1747 22 G Posterior;Right Hand 3 days         Peripheral IV - Single Lumen 08/04/23 0845 18 G;1 3/4 in Anterior;Right Upper Arm 2 days                       Physical Exam  Vitals and nursing note reviewed.    Constitutional:       General: She is not in acute distress.     Appearance: She is not toxic-appearing or diaphoretic.   HENT:      Head: Normocephalic and atraumatic.      Mouth/Throat:      Mouth: Mucous membranes are moist.      Pharynx: Oropharynx is clear.   Eyes:      Extraocular Movements: Extraocular movements intact.      Conjunctiva/sclera: Conjunctivae normal.   Cardiovascular:      Rate and Rhythm: Normal rate and regular rhythm.   Pulmonary:      Effort: Pulmonary effort is normal. No respiratory distress.      Breath sounds: No wheezing.   Abdominal:      General: There is no distension.      Palpations: Abdomen is soft.      Tenderness: There is no abdominal tenderness. There is no guarding.   Musculoskeletal:         General: No tenderness. Normal range of motion.      Cervical back: Normal range of motion and neck supple.      Right lower leg: No edema.      Left lower leg: No edema.   Skin:     General: Skin is warm and dry.   Neurological:      Mental Status: She is alert and oriented to person, place, and time. Mental status is at baseline.      Cranial Nerves: No dysarthria.   Psychiatric:         Mood and Affect: Mood normal.         Behavior: Behavior normal.            Significant Labs: All pertinent lab results from the last 24 hours have been reviewed.    Significant Imaging:  All relevant imaging reviewed

## 2023-08-06 NOTE — ASSESSMENT & PLAN NOTE
CXR with B/L edema    EF decreased to 40-45%  Another dose of IV lasix today, likely would need PO lasix on discharge

## 2023-08-06 NOTE — CONSULTS
"[facility] - [facility] Step[facility]  Infectious Disease  Consult Note    Patient Name: Mariann Huff  MRN: [id number]  Admission Date: 8/2/2023  Hospital Length of Stay: 4 days  Attending Physician: Miguel Vora MD  Primary Care Provider: Jasbir Haney MD     Isolation Status: No active isolations    Patient information was obtained from patient and ER records.      Inpatient consult to Infectious Diseases  Consult performed by: Aby Rosario MD  Consult ordered by: Shen Reese MD        Assessment/Plan:     GI  Liver lesion  63F with h/o CAD , HTN, HLD, DM2, MURTAZA,  biliary pancreatitis s/p sphincterotomy, biliary stent, cholecystectomy in 06/2023, s/p ORIF of her left proximal humerus fracture with IM nail  7/12 re-admitted 8/2 with NSTEMI. CT triple phase with multiple hepatic lesions, possible abscess. BCx neg. Afebrile without systemic symptoms of infection. ID consulted for "multiple hepatic abscesses in liver which initially were though to be mets, advice antibiotic plan please"    Liver lesion- d/dx: infection vs non-infection. Risk factors for infection include recent instrumentation for biliary stent, prior bacteremias (staph lug, e.coli), prior infections with retained hardware    Recommendation:   - agree with IR aspiration of lesion for culture to guide need for antibiotic therapy. Please send aerobe, anaerobe, fungal cultures and pathology   - given clinical stability and neg bcx, would hold off on empiric antibiotics to increase yield of cultures       Prior to discharge, please ensure the patient's follow-up has been scheduled.     If there is still no follow-up scheduled prior to discharge, please send an EPIC message to Cleo Marroquin in Infectious Diseases.                  Thank you for your consult. I will follow-up with patient. Please contact us if you have any additional questions.    Aby Rosario MD  Infectious Disease  [facility]    Subjective: " "    Principal Problem: NSTEMI (non-ST elevated myocardial infarction)    HPI: 63F with h/o CAD , HTN, HLD, DM2, MURTAZA,  biliary pancreatitis s/p sphincterotomy, biliary stent, cholecystectomy in 06/2023, s/p ORIF of her left proximal humerus fracture with IM nail  7/12 re-admitted 8/2 with N/V found to have NSTEMI. Reports feeling better than admit. Denies f/c. Denies abd pain. Doesn't recall being on abx for a while. Reports L arm doing well since surgery, incision healing.     Note-  previously followed by ID in 2019 (new to me) for E coli bacteremia and Staph lugdenesis infection (abdominal source) with plan for subsequent suppressive therapy given concern for hardware involvement. Also treated in past for HAP (Pseudomonas) and candida glabrata UTI . Had a persistent fluid collection along inferior aspect of midline abdominal incision with surrounding inflammatory changes, IR aspiration of collection almost completely drained with few WBCs and cultures NGTD, treated with meropenem and then took cefadroxil/rifampin for unclear period of time      S/p LHC on 8/3 with multivessel disease with no significant stenosis and plan for medical management without stents placed.        Ct  Small collection near the cholecystectomy bed adjacent to the surgical clips measuring 1.3 cm (series 302, image 29).  Additional small collection, possibly contiguous or separate along the inferomedial right lobe measuring 2.1 x 1.4 cm (series 302, image 36).     Bile Ducts: Biliary stent in place with trace pneumobilia.  Some prominence of central intrahepatic ducts at the confluence with thin wall enhancement.     Spleen: Unremarkable.     Pancreas: Redemonstration of heterogeneity and fullness at the pancreatic head with area in total measuring 3.6 x 2.0 cm (series 301, image 67).  Mild dilatation of the immediate upstream pancreatic duct up to 5 mm    Afebrile    Received 3 days zosyn    ID consulted for "multiple hepatic abscesses in " "liver which initially were though to be mets, advice antibiotic plan please"        Past Medical History:   Diagnosis Date    Anticoagulant long-term use     Asthma     Back pain     Bradycardia, unspecified 05/08/2019    The etiology of the bradycardic episode is unclear.  The have appear to be respiratory in origin (at least the 1st episode).  We will continue to monitor carefully.  We are awaiting evaluation by Cardiology.     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, with long-term current use of insulin 01/09/2020       Past Surgical History:   Procedure Laterality Date    ANGIOGRAM, CORONARY, WITH LEFT HEART CATHETERIZATION N/A 8/3/2023    Procedure: Angiogram, Coronary, with Left Heart Cath;  Surgeon: Aime George MD;  Location: [facility] CATH LAB;  Service: Cardiology;  Laterality: N/A;    ANTEGRADE NEPHROSTOGRAPHY Left 12/11/2019    Procedure: Nephrostogram - antegrade;  Surgeon: Robin Boyd MD;  [facility];  Service: Urology;  Laterality: Left;    BRONCHOSCOPY N/A 5/2/2019    Procedure: BRONCHOSCOPY;  Surgeon: Sean Ruano MD;  [facility];  Service: General;  Laterality: N/A;    CARDIAC CATHETERIZATION      CATARACT EXTRACTION      cataract extraction left eye      cataracts      CAUDAL EPIDURAL STEROID INJECTION N/A 2/7/2019    Procedure: " Injection-steroid-epidural-caudal;  Surgeon: Dave Bentley Jr., MD;  Location: [facility] PAIN MGT;  Service: Pain Management;  Laterality: N/A;     SECTION, LOW TRANSVERSE      COLONOSCOPY N/A 2019    Procedure: COLONOSCOPY;  Surgeon: Al Alaniz MD;  Location: [facility] ENDO (2ND FLR);  Service: Endoscopy;  Laterality: N/A;    CORONARY ANGIOPLASTY      CYSTOGRAM N/A 2019    Procedure: CYSTOGRAM INTRAOP;  Surgeon: Robin Boyd MD;  [facility];  Service: Urology;  Laterality: N/A;  1 HOUR    CYSTOSCOPY W/ RETROGRADES Left 2019    Procedure: CYSTOSCOPY, WITH RETROGRADE PYELOGRAM;  Surgeon: Robin Boyd MD;  [facility];  Service: Urology;  Laterality: Left;  fluro    ENDOSCOPIC ULTRASOUND OF UPPER GASTROINTESTINAL TRACT N/A 6/15/2023    Procedure: ULTRASOUND, UPPER GI TRACT, ENDOSCOPIC;  Surgeon: Lisa Kim MD;  Location: [facility] ENDO (2ND FLR);  Service: Endoscopy;  Laterality: N/A;    ERCP N/A 6/15/2023    Procedure: ERCP (ENDOSCOPIC RETROGRADE CHOLANGIOPANCREATOGRAPHY);  Surgeon: Lisa Kmi MD;  Location: [facility] ENDO (2ND FLR);  Service: Endoscopy;  Laterality: N/A;    ESOPHAGOGASTRODUODENOSCOPY W/ PEG  2019    Procedure: EGD, WITH PEG TUBE INSERTION;  Surgeon: Sean Ruano MD;  [facility];  Service: General;;    EXCISION TURBINATE, SUBMUCOUS      FLEXIBLE SIGMOIDOSCOPY N/A 2019    Procedure: SIGMOIDOSCOPY, FLEXIBLE;  Surgeon: ALBERTO Amin MD;  Location: [facility] ENDO (2ND FLR);  Service: Endoscopy;  Laterality: N/A;    FLEXIBLE SIGMOIDOSCOPY N/A 2019    Procedure: SIGMOIDOSCOPY, FLEXIBLE;  Surgeon: ALBERTO Amin MD;  Location: [facility] ENDO (2ND FLR);  Service: Endoscopy;  Laterality: N/A;    FUSION OF LUMBAR SPINE BY ANTERIOR APPROACH Left 2019    Procedure: FUSION, SPINE, LUMBAR, ANTERIOR APPROACH Left L5-S1 Anterior to Psoa Interbody Fusion, L5-S1 Posterior Instrumentation;  Surgeon: Mk George,  MD;  Location: [address];  Service: Neurosurgery;  Laterality: Left;  Porcedure:  Left L5-S1 Anterior to Psoa Interbody Fusion, L5-S1 Posterior Instrumentation  Surgery Time: 4 Hrs  LOS: 2-3  Anesthesia: General   Blood: Type & Screen  R    HAND SURGERY Left     HAND SURGERY Right     torn ligament repair/ Dr. Yeboah    HYSTERECTOMY      INJECTION OF STEROID Right 12/10/2020    Procedure: INJECTION, STEROID Right SI Joint Block and Steroid Injection;  Surgeon: Mk George MD;  Location: [facility];  Service: Neurosurgery;  Laterality: Right;  Procedure: Right SI Joint Block and Steroid Injection   SUrgery Time: 30 Min  LOS: 0  Anesthesia: MAC  Radiology: C-arm  Bed: [place] [id number] Poster  Position: Prone    INJECTION OF STEROID Right 9/28/2021    Procedure: INJECTION, STEROID Right SI joint block & steroid injection;  Surgeon: Mk George MD;  Location: [facility];  Service: Neurosurgery;  Laterality: Right;  Procedure: Right SI joint block & steroid injection  Surgery Time: 30m  Anesthesia: Local MAC  Radiology: C-arm  Bed: Regular  Position: Prone    LAPAROSCOPIC CHOLECYSTECTOMY N/A 6/23/2023    Procedure: CHOLECYSTECTOMY, LAPAROSCOPIC;  Surgeon: Richard Penny MD;  Location: [address];  Service: General;  Laterality: N/A;    left foot surgery      left wrist surgery      LYSIS OF ADHESIONS N/A 2/19/2020    Procedure: LYSIS, ADHESIONS;  Surgeon: Robin Boyd MD;  Location: [address];  Service: Urology;  Laterality: N/A;    NASAL SEPTUM SURGERY  5/7/15    OPEN REDUCTION AND INTERNAL FIXATION (ORIF) OF FRACTURE OF PROXIMAL HUMERUS Left 7/12/2023    Procedure: ORIF, FRACTURE, HUMERUS, PROXIMAL ;  Surgeon: Tomasz Leary MD;  Location: [address];  Service: Orthopedics;  Laterality: Left;    PERCUTANEOUS NEPHROSTOMY Left 4/21/2019    Procedure: Creation, Nephrostomy, Percutaneous;  Surgeon: Karina Surgeon;  Location: [facility];  Service: Anesthesiology;  Laterality: Left;     REPAIR OF URETER  4/12/2019    Procedure: REPAIR, URETER;  Surgeon: Mk George MD;  [address]  Service: Neurosurgery;;    REPLACEMENT OF NEPHROSTOMY TUBE N/A 7/18/2019    Procedure: REPLACEMENT, NEPHROSTOMY TUBE;  Surgeon: [facility] Diagnostic Provider;  [facility];  Service: Anesthesiology;  Laterality: N/A;  188    REPLACEMENT OF NEPHROSTOMY TUBE N/A 7/24/2019    Procedure: REPLACEMENT, NEPHROSTOMY TUBE;  Surgeon: [facility] Diagnostic Provider;  [facility];  Service: Anesthesiology;  Laterality: N/A;  188    REPLACEMENT OF NEPHROSTOMY TUBE N/A 10/7/2019    Procedure: REPLACEMENT, NEPHROSTOMY TUBE;  Surgeon: [facility] Diagnostic Provider;  [facility];  Service: Anesthesiology;  Laterality: N/A;  189    REPLACEMENT OF NEPHROSTOMY TUBE N/A 11/25/2019    Procedure: REPLACEMENT, NEPHROSTOMY TUBE;  Surgeon: [facility] Diagnostic Provider;  [address]  Service: Anesthesiology;  Laterality: N/A;  Room 188/Bessy    REPLACEMENT OF NEPHROSTOMY TUBE Right 2/19/2020    Procedure: REPLACEMENT, NEPHROSTOMY TUBE removal removal;  Surgeon: Robin Boyd MD;  Location: [address];  Service: Urology;  Laterality: Right;    RIGHT HEART CATHETERIZATION Right 8/3/2023    Procedure: INSERTION, CATHETER, RIGHT HEART;  Surgeon: Aime George MD;  Location: [facility] CATH LAB;  Service: Cardiology;  Laterality: Right;    rt elbow surgery      S/P LAD COATED STENT  05/14/2010    6 total     S/P OM1 STENT  08/2003    SINUS SURGERY      F.E.S.S.    TRACHEOSTOMY N/A 5/2/2019    Procedure: CREATION, TRACHEOSTOMY;  Surgeon: Sean Ruano MD;  Location: [address];  Service: General;  Laterality: N/A;    TUBAL LIGATION      URETERAL REIMPLANTION Left 2/19/2020    Procedure: REIMPLANTATION, URETER WITH PSOAS HITCH;  Surgeon: Robin Boyd MD;  Location: [facility] OR [address] [place] [address];  Service: Urology;  Laterality: Left;    VENTRICULOGRAM, LEFT  8/3/2023     Procedure: Ventriculogram, Left;  Surgeon: Aime George MD;  Location: [facility] CATH LAB;  Service: Cardiology;;       Review of patient's allergies indicates:   Allergen Reactions    Milk containing products (dairy)      Causes GI distress       Current Facility-Administered Medications on File Prior to Encounter   Medication    0.9%  NaCl infusion    fentaNYL 50 mcg/mL injection  mcg    midazolam (VERSED) 1 mg/mL injection 0.5-4 mg     Current Outpatient Medications on File Prior to Encounter   Medication Sig    ACCU-CHEK EDIN PLUS METER Misc     acetaminophen (TYLENOL) 650 MG TbSR Take 1 tablet (650 mg total) by mouth every 8 (eight) hours.    albuterol (PROVENTIL/VENTOLIN HFA) 90 mcg/actuation inhaler Inhale 2 puffs into the lungs every 6 (six) hours as needed for Wheezing.    amLODIPine (NORVASC) 10 MG tablet Take 1 tablet (10 mg total) by mouth once daily.    apixaban (ELIQUIS) 2.5 mg Tab Take 1 tablet (2.5 mg total) by mouth 2 (two) times daily.    aspirin 81 mg Tab Take 81 mg by mouth once daily. 1 Tablet Oral Every day    atorvastatin (LIPITOR) 40 MG tablet Take 1 tablet (40 mg total) by mouth every evening.    blood sugar diagnostic (ACCU-CHEK EDIN PLUS TEST STRP) Strp TEST BLOOD SUGARS 4 TIMES DAILY    celecoxib (CELEBREX) 200 MG capsule Take 1 capsule (200 mg total) by mouth once daily.    cilostazoL (PLETAL) 100 MG Tab Take 1 tablet (100 mg total) by mouth 2 (two) times daily.    dapagliflozin (FARXIGA) 10 mg tablet Take 1 tablet (10 mg total) by mouth once daily. (Patient not taking: Reported on 7/10/2023)    diclofenac sodium (VOLTAREN) 1 % Gel Apply 2 g topically 3 (three) times daily. Apply to the area of pain 2-3x per day or night as needed (Patient not taking: Reported on 7/10/2023)    EScitalopram oxalate (LEXAPRO) 10 MG tablet Take 1 tablet (10 mg total) by mouth once daily.    gabapentin (NEURONTIN) 300 MG capsule Take 1 capsule (300 mg) in the morning and 2 capsules  "(600 mg) in the evening (Patient not taking: Reported on 7/11/2023)    glipiZIDE (GLUCOTROL) 10 MG TR24 Take 1 tablet (10 mg total) by mouth daily with breakfast.    hydrALAZINE (APRESOLINE) 25 MG tablet Take 1 tablet (25 mg total) by mouth every 12 (twelve) hours.    insulin detemir U-100, Levemir, (LEVEMIR FLEXPEN) 100 unit/mL (3 mL) InPn pen Inject 14 Units into the skin every evening. (Patient taking differently: Inject into the skin every evening. Adjusted dose based on glucose)    isosorbide mononitrate (ISMO,MONOKET) 10 mg tablet Take 1 tablet (10 mg total) by mouth once daily.    metoprolol tartrate (LOPRESSOR) 50 MG tablet Take 50 mg by mouth 2 (two) times daily.    multivitamin (THERAGRAN) per tablet Take 1 tablet by mouth once daily.    oxyCODONE (ROXICODONE) 5 MG immediate release tablet Take 1 tablet (5 mg total) by mouth every 4 (four) hours as needed for Pain.    pen needle, diabetic (NOVOTWIST) 32 gauge x 1/5" Ndle Use 1 needle to inject insulin four times daily    pen needle, diabetic 32 gauge x 5/32" Ndle Use as directed     Family History       Problem Relation (Age of Onset)    Diabetes Mother, Father, Sister, Brother, Sister    Heart attack Father    Heart disease Mother    Leukemia Father    No Known Problems Sister, Brother, Brother, Maternal Grandmother, Maternal Grandfather, Paternal Grandmother, Paternal Grandfather, Son, Son, Maternal Aunt, Maternal Uncle, Paternal Aunt, Paternal Uncle          Tobacco Use    Smoking status: Never    Smokeless tobacco: Never   Substance and Sexual Activity    Alcohol use: No     Alcohol/week: 0.0 standard drinks of alcohol    Drug use: No    Sexual activity: Yes     Partners: Male     Birth control/protection: Post-menopausal     Comment:      Review of Systems   Constitutional:  Negative for chills and fever.   HENT:  Negative for congestion and sore throat.    Respiratory:  Negative for cough and shortness of breath.  "   Cardiovascular:  Negative for chest pain and palpitations.   Gastrointestinal:  Positive for abdominal pain (stinging, onset same time as vomiting) and vomiting (food particles, nonbloody). Negative for blood in stool, constipation and diarrhea.   Genitourinary:  Negative for dysuria and frequency.   Musculoskeletal:  Negative for arthralgias and myalgias.   Skin:  Negative for rash and wound.   Neurological:  Negative for dizziness and syncope.     Objective:     Vital Signs (Most Recent):  Temp: 97.3 °F (36.3 °C) (08/06/23 1635)  Pulse: 87 (08/06/23 1635)  Resp: 16 (08/06/23 1635)  BP: 136/72 (08/06/23 1635)  SpO2: 98 % (08/06/23 1635) Vital Signs (24h Range):  Temp:  [97.3 °F (36.3 °C)-98.5 °F (36.9 °C)] 97.3 °F (36.3 °C)  Pulse:  [67-92] 87  Resp:  [16-25] 16  SpO2:  [95 %-100 %] 98 %  BP: (119-141)/(61-72) 136/72     Weight: 58.1 kg (128 lb)  Body mass index is 21.97 kg/m².     Physical Exam  Vitals and nursing note reviewed.   Constitutional:       General: She is not in acute distress.     Appearance: She is ill-appearing. She is not toxic-appearing or diaphoretic.   Cardiovascular:      Rate and Rhythm: Normal rate and regular rhythm.   Pulmonary:      Effort: Pulmonary effort is normal. No respiratory distress.      Breath sounds: No wheezing.   Abdominal:      General: There is no distension.      Palpations: Abdomen is soft.      Tenderness: There is no guarding.      Comments: Nonacute abdominal exam   Musculoskeletal:      Right lower leg: No edema.      Left lower leg: No edema.   Skin:     General: Skin is warm and dry.   Neurological:      Mental Status: She is alert. Mental status is at baseline.      Cranial Nerves: No dysarthria.   Psychiatric:         Mood and Affect: Mood normal.         Behavior: Behavior normal.                Significant Labs: All pertinent labs within the past 24 hours have been reviewed.    Significant Imaging: I have reviewed all pertinent imaging results/findings within  the past 24 hours.

## 2023-08-06 NOTE — SUBJECTIVE & OBJECTIVE
Past Medical History:   Diagnosis Date    Anticoagulant long-term use     Asthma     Back pain     Bradycardia, unspecified 05/08/2019    The etiology of the bradycardic episode is unclear.  The have appear to be respiratory in origin (at least the 1st episode).  We will continue to monitor carefully.  We are awaiting evaluation by Cardiology.      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, with long-term current use of insulin 01/09/2020       Past Surgical History:   Procedure Laterality Date    ANGIOGRAM, CORONARY, WITH LEFT HEART CATHETERIZATION N/A 8/3/2023    Procedure: Angiogram, Coronary, with Left Heart Cath;  Surgeon: Aime George MD;  Location: [facility] CATH LAB;  Service: Cardiology;  Laterality: N/A;    ANTEGRADE NEPHROSTOGRAPHY Left 12/11/2019    Procedure: Nephrostogram - antegrade;  Surgeon: Robin Boyd MD;  Location: [facility] OR [address] [place] [address];  Service: Urology;  Laterality: Left;    BRONCHOSCOPY N/A 5/2/2019    Procedure: BRONCHOSCOPY;  Surgeon: Sean Ruano MD;  Location: [facility] OR [address] [place] [address];  Service: General;  Laterality: N/A;    CARDIAC CATHETERIZATION      CATARACT EXTRACTION      cataract extraction left eye      cataracts      CAUDAL EPIDURAL STEROID INJECTION N/A 2/7/2019    Procedure: Injection-steroid-epidural-caudal;  Surgeon: Dave Bentley Jr., MD;  Location: [facility] PAIN MGT;  Service: Pain  Management;  Laterality: N/A;     SECTION, LOW TRANSVERSE      COLONOSCOPY N/A 2019    Procedure: COLONOSCOPY;  Surgeon: Al Alaniz MD;  Location: [facility] ENDO (2ND FLR);  Service: Endoscopy;  Laterality: N/A;    CORONARY ANGIOPLASTY      CYSTOGRAM N/A 2019    Procedure: CYSTOGRAM INTRAOP;  Surgeon: Robin Boyd MD;  [facility];  Service: Urology;  Laterality: N/A;  1 HOUR    CYSTOSCOPY W/ RETROGRADES Left 2019    Procedure: CYSTOSCOPY, WITH RETROGRADE PYELOGRAM;  Surgeon: Robin Boyd MD;  [facility];  Service: Urology;  Laterality: Left;  fluro    ENDOSCOPIC ULTRASOUND OF UPPER GASTROINTESTINAL TRACT N/A 6/15/2023    Procedure: ULTRASOUND, UPPER GI TRACT, ENDOSCOPIC;  Surgeon: Lisa Kim MD;  [facility]  Service: Endoscopy;  Laterality: N/A;    ERCP N/A 6/15/2023    Procedure: ERCP (ENDOSCOPIC RETROGRADE CHOLANGIOPANCREATOGRAPHY);  Surgeon: Lisa Kim MD;  [facility]  Service: Endoscopy;  Laterality: N/A;    ESOPHAGOGASTRODUODENOSCOPY W/ PEG  2019    Procedure: EGD, WITH PEG TUBE INSERTION;  Surgeon: Sean Ruano MD;  [facility];  Service: General;;    EXCISION TURBINATE, SUBMUCOUS      FLEXIBLE SIGMOIDOSCOPY N/A 2019    Procedure: SIGMOIDOSCOPY, FLEXIBLE;  Surgeon: ALBERTO Amin MD;  [facility]  Service: Endoscopy;  Laterality: N/A;    FLEXIBLE SIGMOIDOSCOPY N/A 2019    Procedure: SIGMOIDOSCOPY, FLEXIBLE;  Surgeon: ALBERTO Amin MD;  [facility]  Service: Endoscopy;  Laterality: N/A;    FUSION OF LUMBAR SPINE BY ANTERIOR APPROACH Left 2019    Procedure: FUSION, SPINE, LUMBAR, ANTERIOR APPROACH Left L5-S1 Anterior to Psoa Interbody Fusion, L5-S1 Posterior Instrumentation;  Surgeon: Mk George MD;  [facility];  Service: Neurosurgery;  Laterality: Left;  Porcedure:  Left L5-S1 Anterior to Psoa Interbody  Fusion, L5-S1 Posterior Instrumentation  Surgery Time: 4 Hrs  LOS: 2-3  Anesthesia: General   Blood: Type & Screen  R    HAND SURGERY Left     HAND SURGERY Right     torn ligament repair/ Dr. Yeboah    HYSTERECTOMY      INJECTION OF STEROID Right 12/10/2020    Procedure: INJECTION, STEROID Right SI Joint Block and Steroid Injection;  Surgeon: Mk George MD;  Location: [facility];  Service: Neurosurgery;  Laterality: Right;  Procedure: Right SI Joint Block and Steroid Injection   SUrgery Time: 30 Min  LOS: 0  Anesthesia: MAC  Radiology: C-arm  Bed: Tomer 4 Poster  Position: Prone    INJECTION OF STEROID Right 9/28/2021    Procedure: INJECTION, STEROID Right SI joint block & steroid injection;  Surgeon: Mk George MD;  Location: [facility];  Service: Neurosurgery;  Laterality: Right;  Procedure: Right SI joint block & steroid injection  Surgery Time: 30m  Anesthesia: Local MAC  Radiology: C-arm  Bed: Regular  Position: Prone    LAPAROSCOPIC CHOLECYSTECTOMY N/A 6/23/2023    Procedure: CHOLECYSTECTOMY, LAPAROSCOPIC;  Surgeon: Richard Penny MD;  Location: [address];  Service: General;  Laterality: N/A;    left foot surgery      left wrist surgery      LYSIS OF ADHESIONS N/A 2/19/2020    Procedure: LYSIS, ADHESIONS;  Surgeon: Robin Boyd MD;  Location: [address];  Service: Urology;  Laterality: N/A;    NASAL SEPTUM SURGERY  5/7/15    OPEN REDUCTION AND INTERNAL FIXATION (ORIF) OF FRACTURE OF PROXIMAL HUMERUS Left 7/12/2023    Procedure: ORIF, FRACTURE, HUMERUS, PROXIMAL ;  Surgeon: Tomasz Leary MD;  Location: [address];  Service: Orthopedics;  Laterality: Left;    PERCUTANEOUS NEPHROSTOMY Left 4/21/2019    Procedure: Creation, Nephrostomy, Percutaneous;  Surgeon: Karina Surgeon;  Location: [facility];  Service: Anesthesiology;  Laterality: Left;    REPAIR OF URETER  4/12/2019    Procedure: REPAIR, URETER;  Surgeon: Mk George MD;  Location: [address];  Service:  Neurosurgery;;    REPLACEMENT OF NEPHROSTOMY TUBE N/A 7/18/2019    Procedure: REPLACEMENT, NEPHROSTOMY TUBE;  Surgeon: [facility] Diagnostic Provider;  [facility];  Service: Anesthesiology;  Laterality: N/A;  188    REPLACEMENT OF NEPHROSTOMY TUBE N/A 7/24/2019    Procedure: REPLACEMENT, NEPHROSTOMY TUBE;  Surgeon: [facility] Diagnostic Provider;  [facility];  Service: Anesthesiology;  Laterality: N/A;  188    REPLACEMENT OF NEPHROSTOMY TUBE N/A 10/7/2019    Procedure: REPLACEMENT, NEPHROSTOMY TUBE;  Surgeon: [facility] Diagnostic Provider;  [facility];  Service: Anesthesiology;  Laterality: N/A;  189    REPLACEMENT OF NEPHROSTOMY TUBE N/A 11/25/2019    Procedure: REPLACEMENT, NEPHROSTOMY TUBE;  Surgeon: [facility] Diagnostic Provider;  [facility];  Service: Anesthesiology;  Laterality: N/A;  Room 188/Bessy    REPLACEMENT OF NEPHROSTOMY TUBE Right 2/19/2020    Procedure: REPLACEMENT, NEPHROSTOMY TUBE removal removal;  Surgeon: Robin Boyd MD;  Location: [address];  Service: Urology;  Laterality: Right;    RIGHT HEART CATHETERIZATION Right 8/3/2023    Procedure: INSERTION, CATHETER, RIGHT HEART;  Surgeon: Aime George MD;  Location: [facility] CATH LAB;  Service: Cardiology;  Laterality: Right;    rt elbow surgery      S/P LAD COATED STENT  05/14/2010    6 total     S/P OM1 STENT  08/2003    SINUS SURGERY      F.E.S.S.    TRACHEOSTOMY N/A 5/2/2019    Procedure: CREATION, TRACHEOSTOMY;  Surgeon: Sean Ruano MD;  Location: [address];  Service: General;  Laterality: N/A;    TUBAL LIGATION      URETERAL REIMPLANTION Left 2/19/2020    Procedure: REIMPLANTATION, URETER WITH PSOAS HITCH;  Surgeon: Robin Boyd MD;  Location: [address];  Service: Urology;  Laterality: Left;    VENTRICULOGRAM, LEFT  8/3/2023    Procedure: Ventriculogram, Left;  Surgeon: Aime George MD;  Location: [facility] CATH LAB;  Service: Cardiology;;       Review of patient's allergies  indicates:   Allergen Reactions    Milk containing products (dairy)      Causes GI distress       Current Facility-Administered Medications on File Prior to Encounter   Medication    0.9%  NaCl infusion    fentaNYL 50 mcg/mL injection  mcg    midazolam (VERSED) 1 mg/mL injection 0.5-4 mg     Current Outpatient Medications on File Prior to Encounter   Medication Sig    ACCU-CHEK EDIN PLUS METER Misc     acetaminophen (TYLENOL) 650 MG TbSR Take 1 tablet (650 mg total) by mouth every 8 (eight) hours.    albuterol (PROVENTIL/VENTOLIN HFA) 90 mcg/actuation inhaler Inhale 2 puffs into the lungs every 6 (six) hours as needed for Wheezing.    amLODIPine (NORVASC) 10 MG tablet Take 1 tablet (10 mg total) by mouth once daily.    apixaban (ELIQUIS) 2.5 mg Tab Take 1 tablet (2.5 mg total) by mouth 2 (two) times daily.    aspirin 81 mg Tab Take 81 mg by mouth once daily. 1 Tablet Oral Every day    atorvastatin (LIPITOR) 40 MG tablet Take 1 tablet (40 mg total) by mouth every evening.    blood sugar diagnostic (ACCU-CHEK EDIN PLUS TEST STRP) Strp TEST BLOOD SUGARS 4 TIMES DAILY    celecoxib (CELEBREX) 200 MG capsule Take 1 capsule (200 mg total) by mouth once daily.    cilostazoL (PLETAL) 100 MG Tab Take 1 tablet (100 mg total) by mouth 2 (two) times daily.    dapagliflozin (FARXIGA) 10 mg tablet Take 1 tablet (10 mg total) by mouth once daily. (Patient not taking: Reported on 7/10/2023)    diclofenac sodium (VOLTAREN) 1 % Gel Apply 2 g topically 3 (three) times daily. Apply to the area of pain 2-3x per day or night as needed (Patient not taking: Reported on 7/10/2023)    EScitalopram oxalate (LEXAPRO) 10 MG tablet Take 1 tablet (10 mg total) by mouth once daily.    gabapentin (NEURONTIN) 300 MG capsule Take 1 capsule (300 mg) in the morning and 2 capsules (600 mg) in the evening (Patient not taking: Reported on 7/11/2023)    glipiZIDE (GLUCOTROL) 10 MG TR24 Take 1 tablet (10 mg total) by mouth daily with breakfast.     "hydrALAZINE (APRESOLINE) 25 MG tablet Take 1 tablet (25 mg total) by mouth every 12 (twelve) hours.    insulin detemir U-100, Levemir, (LEVEMIR FLEXPEN) 100 unit/mL (3 mL) InPn pen Inject 14 Units into the skin every evening. (Patient taking differently: Inject into the skin every evening. Adjusted dose based on glucose)    isosorbide mononitrate (ISMO,MONOKET) 10 mg tablet Take 1 tablet (10 mg total) by mouth once daily.    metoprolol tartrate (LOPRESSOR) 50 MG tablet Take 50 mg by mouth 2 (two) times daily.    multivitamin (THERAGRAN) per tablet Take 1 tablet by mouth once daily.    oxyCODONE (ROXICODONE) 5 MG immediate release tablet Take 1 tablet (5 mg total) by mouth every 4 (four) hours as needed for Pain.    pen needle, diabetic (NOVOTWIST) 32 gauge x 1/5" Ndle Use 1 needle to inject insulin four times daily    pen needle, diabetic 32 gauge x 5/32" Ndle Use as directed     Family History       Problem Relation (Age of Onset)    Diabetes Mother, Father, Sister, Brother, Sister    Heart attack Father    Heart disease Mother    Leukemia Father    No Known Problems Sister, Brother, Brother, Maternal Grandmother, Maternal Grandfather, Paternal Grandmother, Paternal Grandfather, Son, Son, Maternal Aunt, Maternal Uncle, Paternal Aunt, Paternal Uncle          Tobacco Use    Smoking status: Never    Smokeless tobacco: Never   Substance and Sexual Activity    Alcohol use: No     Alcohol/week: 0.0 standard drinks of alcohol    Drug use: No    Sexual activity: Yes     Partners: Male     Birth control/protection: Post-menopausal     Comment:      Review of Systems   Constitutional:  Negative for chills and fever.   HENT:  Negative for congestion and sore throat.    Respiratory:  Negative for cough and shortness of breath.    Cardiovascular:  Negative for chest pain and palpitations.   Gastrointestinal:  Positive for abdominal pain (stinging, onset same time as vomiting) and vomiting (food particles, nonbloody). " Negative for blood in stool, constipation and diarrhea.   Genitourinary:  Negative for dysuria and frequency.   Musculoskeletal:  Negative for arthralgias and myalgias.   Skin:  Negative for rash and wound.   Neurological:  Negative for dizziness and syncope.     Objective:     Vital Signs (Most Recent):  Temp: 97.3 °F (36.3 °C) (08/06/23 1635)  Pulse: 87 (08/06/23 1635)  Resp: 16 (08/06/23 1635)  BP: 136/72 (08/06/23 1635)  SpO2: 98 % (08/06/23 1635) Vital Signs (24h Range):  Temp:  [97.3 °F (36.3 °C)-98.5 °F (36.9 °C)] 97.3 °F (36.3 °C)  Pulse:  [67-92] 87  Resp:  [16-25] 16  SpO2:  [95 %-100 %] 98 %  BP: (119-141)/(61-72) 136/72     Weight: 58.1 kg (128 lb)  Body mass index is 21.97 kg/m².     Physical Exam  Vitals and nursing note reviewed.   Constitutional:       General: She is not in acute distress.     Appearance: She is ill-appearing. She is not toxic-appearing or diaphoretic.   Cardiovascular:      Rate and Rhythm: Normal rate and regular rhythm.   Pulmonary:      Effort: Pulmonary effort is normal. No respiratory distress.      Breath sounds: No wheezing.   Abdominal:      General: There is no distension.      Palpations: Abdomen is soft.      Tenderness: There is no guarding.      Comments: Nonacute abdominal exam   Musculoskeletal:      Right lower leg: No edema.      Left lower leg: No edema.   Skin:     General: Skin is warm and dry.   Neurological:      Mental Status: She is alert. Mental status is at baseline.      Cranial Nerves: No dysarthria.   Psychiatric:         Mood and Affect: Mood normal.         Behavior: Behavior normal.                Significant Labs: All pertinent labs within the past 24 hours have been reviewed.    Significant Imaging: I have reviewed all pertinent imaging results/findings within the past 24 hours.

## 2023-08-06 NOTE — ASSESSMENT & PLAN NOTE
"63F with h/o CAD , HTN, HLD, DM2, MURTAZA,  biliary pancreatitis s/p sphincterotomy, biliary stent, cholecystectomy in 06/2023, s/p ORIF of her left proximal humerus fracture with IM nail  7/12 re-admitted 8/2 with NSTEMI. CT triple phase with multiple hepatic lesions, possible abscess. BCx neg. Afebrile without systemic symptoms of infection. ID consulted for "multiple hepatic abscesses in liver which initially were though to be mets, advice antibiotic plan please"    Liver lesion- d/dx: infection vs non-infection. Risk factors for infection include recent instrumentation for biliary stent, prior bacteremias (staph lug, e.coli), prior infections with retained hardware    Recommendation:   - agree with IR aspiration of lesion for culture to guide need for antibiotic therapy. Please send aerobe, anaerobe, fungal cultures and pathology   - given clinical stability and neg bcx, would hold off on empiric antibiotics to increase yield of cultures       Prior to discharge, please ensure the patient's follow-up has been scheduled.     If there is still no follow-up scheduled prior to discharge, please send an EPIC message to Cleo Marroquin in Infectious Diseases.            "

## 2023-08-06 NOTE — TREATMENT PLAN
Hepatology Treatment Plan    Mariann Huff is a 63 y.o. female admitted to hospital 8/2/2023 (Hospital Day: 5) due to NSTEMI (non-ST elevated myocardial infarction).     Interval History  CT triple phase showing multiple lesions, findings suspicious for hepatic abscesses.     Objective  Temp:  [96.4 °F (35.8 °C)-98.5 °F (36.9 °C)] 97.6 °F (36.4 °C) (08/06 1210)  Pulse:  [] 79 (08/06 1210)  BP: (119-141)/(60-86) 141/70 (08/06 1210)  Resp:  [17-25] 17 (08/06 1210)  SpO2:  [90 %-100 %] 96 % (08/06 1210)    Laboratory    Lab Results   Component Value Date    WBC 12.16 08/06/2023    HGB 8.5 (L) 08/06/2023    HCT 27.4 (L) 08/06/2023    MCV 82 08/06/2023     08/06/2023       Lab Results   Component Value Date     08/06/2023    K 3.7 08/06/2023     08/06/2023    CO2 25 08/06/2023    BUN 15 08/06/2023    CREATININE 0.9 08/06/2023    CALCIUM 8.8 08/06/2023       Lab Results   Component Value Date    ALBUMIN 1.9 (L) 08/06/2023    ALT 22 08/06/2023    AST 46 (H) 08/06/2023    ALKPHOS 672 (H) 08/06/2023    BILITOT 1.2 (H) 08/06/2023       Lab Results   Component Value Date    INR 1.2 08/02/2023    INR 1.0 06/30/2023    INR 1.2 06/13/2023       MELD 3.0: 15 at 8/4/2023 11:13 AM  MELD-Na: 9 at 8/4/2023 11:13 AM  Calculated from:  Serum Creatinine: 1.1 mg/dL at 8/4/2023 11:13 AM  Serum Sodium: 135 mmol/L at 8/4/2023 11:13 AM  Total Bilirubin: 1.0 mg/dL at 8/4/2023  2:17 AM  Serum Albumin: 1.8 g/dL at 8/4/2023  2:17 AM  INR(ratio): 1.2 at 8/2/2023  8:42 PM  Age at listing (hypothetical): 63 years  Sex: Female at 8/4/2023 11:13 AM    Assessment  62 y/o F with hx of HFpEF, CAD, DM, biliary pancreatitis s/p sphincterotomy, biliary stent, cholecystectomy in 06/2023 presented with abdominal pain associated with vomiting, found to have NSTEMI, had LHC which showed LCx thrombus. CT AP w/ contrast showing innumerable liver lesions, which are new. Tumor markers CEA, AFP and CA 19-9 are all negative. CTAP triple  phase again showing multiple hepatic lesions, could be suspicious for abscess. Blood cx on admission have been no growth. She is afebrile without leukocytosis, abscess felt to be less likely but not impossible given recent instrumentation for biliary stent.     Plan  - Recommend aspiration with IR. Pt is not amenable to proceeding with this inpatient and would like for it to be done outpatient as she is ready for discharge.   - Please schedule Hepatology follow up upon discharge with Dr. Ny.   - No prior colonoscopy; needs screening Colonoscopy upon discharge (will schedule).    Thank you for involving us in the care of Mariann Huff. Please call with any additional concerns or questions.    Sol Gaona MD  Gastroenterology Fellow  [facility]

## 2023-08-07 ENCOUNTER — TELEPHONE (OUTPATIENT)
Dept: ENDOSCOPY | Facility: HOSPITAL | Age: 63
End: 2023-08-07
Payer: COMMERCIAL

## 2023-08-07 VITALS
SYSTOLIC BLOOD PRESSURE: 133 MMHG | TEMPERATURE: 98 F | BODY MASS INDEX: 21.85 KG/M2 | HEART RATE: 84 BPM | WEIGHT: 128 LBS | DIASTOLIC BLOOD PRESSURE: 64 MMHG | OXYGEN SATURATION: 98 % | HEIGHT: 64 IN | RESPIRATION RATE: 18 BRPM

## 2023-08-07 PROBLEM — Z00.00 HEALTHCARE MAINTENANCE: Status: ACTIVE | Noted: 2023-08-07

## 2023-08-07 LAB
ALBUMIN SERPL BCP-MCNC: 1.9 G/DL (ref 3.5–5.2)
ALP SERPL-CCNC: 663 U/L (ref 55–135)
ALT SERPL W/O P-5'-P-CCNC: 22 U/L (ref 10–44)
ANION GAP SERPL CALC-SCNC: 12 MMOL/L (ref 8–16)
AST SERPL-CCNC: 40 U/L (ref 10–40)
BASOPHILS # BLD AUTO: 0.02 K/UL (ref 0–0.2)
BASOPHILS NFR BLD: 0.2 % (ref 0–1.9)
BILIRUB SERPL-MCNC: 1 MG/DL (ref 0.1–1)
BUN SERPL-MCNC: 13 MG/DL (ref 8–23)
CALCIUM SERPL-MCNC: 8.9 MG/DL (ref 8.7–10.5)
CHLORIDE SERPL-SCNC: 100 MMOL/L (ref 95–110)
CO2 SERPL-SCNC: 27 MMOL/L (ref 23–29)
CREAT SERPL-MCNC: 0.8 MG/DL (ref 0.5–1.4)
DIFFERENTIAL METHOD: ABNORMAL
EOSINOPHIL # BLD AUTO: 0.4 K/UL (ref 0–0.5)
EOSINOPHIL NFR BLD: 4.5 % (ref 0–8)
ERYTHROCYTE [DISTWIDTH] IN BLOOD BY AUTOMATED COUNT: 16.5 % (ref 11.5–14.5)
EST. GFR  (NO RACE VARIABLE): >60 ML/MIN/1.73 M^2
GLUCOSE SERPL-MCNC: 124 MG/DL (ref 70–110)
HCT VFR BLD AUTO: 27.4 % (ref 37–48.5)
HGB BLD-MCNC: 8.3 G/DL (ref 12–16)
IMM GRANULOCYTES # BLD AUTO: 0.04 K/UL (ref 0–0.04)
IMM GRANULOCYTES NFR BLD AUTO: 0.4 % (ref 0–0.5)
LYMPHOCYTES # BLD AUTO: 1.6 K/UL (ref 1–4.8)
LYMPHOCYTES NFR BLD: 16.3 % (ref 18–48)
MAGNESIUM SERPL-MCNC: 2.2 MG/DL (ref 1.6–2.6)
MCH RBC QN AUTO: 25.1 PG (ref 27–31)
MCHC RBC AUTO-ENTMCNC: 30.3 G/DL (ref 32–36)
MCV RBC AUTO: 83 FL (ref 82–98)
MONOCYTES # BLD AUTO: 0.9 K/UL (ref 0.3–1)
MONOCYTES NFR BLD: 9.3 % (ref 4–15)
NEUTROPHILS # BLD AUTO: 6.6 K/UL (ref 1.8–7.7)
NEUTROPHILS NFR BLD: 69.3 % (ref 38–73)
NRBC BLD-RTO: 0 /100 WBC
PLATELET # BLD AUTO: 300 K/UL (ref 150–450)
PMV BLD AUTO: 11.5 FL (ref 9.2–12.9)
POCT GLUCOSE: 144 MG/DL (ref 70–110)
POCT GLUCOSE: 302 MG/DL (ref 70–110)
POTASSIUM SERPL-SCNC: 4.1 MMOL/L (ref 3.5–5.1)
PROT SERPL-MCNC: 7.2 G/DL (ref 6–8.4)
RBC # BLD AUTO: 3.31 M/UL (ref 4–5.4)
SODIUM SERPL-SCNC: 139 MMOL/L (ref 136–145)
WBC # BLD AUTO: 9.53 K/UL (ref 3.9–12.7)

## 2023-08-07 PROCEDURE — 25000003 PHARM REV CODE 250

## 2023-08-07 PROCEDURE — 94761 N-INVAS EAR/PLS OXIMETRY MLT: CPT

## 2023-08-07 PROCEDURE — 99233 SBSQ HOSP IP/OBS HIGH 50: CPT | Mod: ,,, | Performed by: INTERNAL MEDICINE

## 2023-08-07 PROCEDURE — 97161 PT EVAL LOW COMPLEX 20 MIN: CPT

## 2023-08-07 PROCEDURE — 83735 ASSAY OF MAGNESIUM: CPT | Performed by: INTERNAL MEDICINE

## 2023-08-07 PROCEDURE — 97116 GAIT TRAINING THERAPY: CPT

## 2023-08-07 PROCEDURE — 99233 PR SUBSEQUENT HOSPITAL CARE,LEVL III: ICD-10-PCS | Mod: ,,, | Performed by: INTERNAL MEDICINE

## 2023-08-07 PROCEDURE — 25000003 PHARM REV CODE 250: Performed by: STUDENT IN AN ORGANIZED HEALTH CARE EDUCATION/TRAINING PROGRAM

## 2023-08-07 PROCEDURE — 99900035 HC TECH TIME PER 15 MIN (STAT)

## 2023-08-07 PROCEDURE — 27000221 HC OXYGEN, UP TO 24 HOURS

## 2023-08-07 PROCEDURE — 85025 COMPLETE CBC W/AUTO DIFF WBC: CPT | Performed by: INTERNAL MEDICINE

## 2023-08-07 PROCEDURE — 25000003 PHARM REV CODE 250: Performed by: INTERNAL MEDICINE

## 2023-08-07 PROCEDURE — 80053 COMPREHEN METABOLIC PANEL: CPT | Performed by: INTERNAL MEDICINE

## 2023-08-07 RX ORDER — CEFPODOXIME PROXETIL 100 MG/1
400 TABLET, FILM COATED ORAL EVERY 12 HOURS
Qty: 168 TABLET | Refills: 0 | Status: ON HOLD | OUTPATIENT
Start: 2023-08-07 | End: 2023-09-01 | Stop reason: HOSPADM

## 2023-08-07 RX ORDER — METOPROLOL SUCCINATE 25 MG/1
12.5 TABLET, EXTENDED RELEASE ORAL DAILY
Qty: 15 TABLET | Refills: 11 | Status: SHIPPED | OUTPATIENT
Start: 2023-08-07 | End: 2024-08-06

## 2023-08-07 RX ORDER — LOSARTAN POTASSIUM 25 MG/1
25 TABLET ORAL DAILY
Status: DISCONTINUED | OUTPATIENT
Start: 2023-08-07 | End: 2023-08-07 | Stop reason: HOSPADM

## 2023-08-07 RX ORDER — LANOLIN ALCOHOL/MO/W.PET/CERES
400 CREAM (GRAM) TOPICAL 2 TIMES DAILY
Qty: 120 TABLET | Refills: 0 | Status: SHIPPED | OUTPATIENT
Start: 2023-08-07 | End: 2023-10-06

## 2023-08-07 RX ORDER — FUROSEMIDE 40 MG/1
40 TABLET ORAL DAILY
Status: DISCONTINUED | OUTPATIENT
Start: 2023-08-07 | End: 2023-08-07 | Stop reason: HOSPADM

## 2023-08-07 RX ORDER — FUROSEMIDE 40 MG/1
40 TABLET ORAL DAILY
Qty: 30 TABLET | Refills: 11 | Status: ON HOLD | OUTPATIENT
Start: 2023-08-07 | End: 2023-09-01 | Stop reason: HOSPADM

## 2023-08-07 RX ORDER — LOSARTAN POTASSIUM 25 MG/1
25 TABLET ORAL DAILY
Qty: 90 TABLET | Refills: 3 | Status: SHIPPED | OUTPATIENT
Start: 2023-08-07 | End: 2024-08-06

## 2023-08-07 RX ORDER — FUROSEMIDE 40 MG/1
40 TABLET ORAL DAILY
Status: DISCONTINUED | OUTPATIENT
Start: 2023-08-07 | End: 2023-08-07

## 2023-08-07 RX ORDER — SPIRONOLACTONE 25 MG/1
12.5 TABLET ORAL DAILY
Qty: 15 TABLET | Refills: 11 | Status: ON HOLD | OUTPATIENT
Start: 2023-08-07 | End: 2023-09-01 | Stop reason: HOSPADM

## 2023-08-07 RX ADMIN — Medication 324 MG: at 08:08

## 2023-08-07 RX ADMIN — ONDANSETRON 8 MG: 4 TABLET, ORALLY DISINTEGRATING ORAL at 08:08

## 2023-08-07 RX ADMIN — INSULIN ASPART 4 UNITS: 100 INJECTION, SOLUTION INTRAVENOUS; SUBCUTANEOUS at 11:08

## 2023-08-07 RX ADMIN — SPIRONOLACTONE 12.5 MG: 25 TABLET ORAL at 11:08

## 2023-08-07 RX ADMIN — ESCITALOPRAM OXALATE 10 MG: 10 TABLET ORAL at 08:08

## 2023-08-07 RX ADMIN — TICAGRELOR 90 MG: 90 TABLET ORAL at 08:08

## 2023-08-07 RX ADMIN — GABAPENTIN 300 MG: 300 CAPSULE ORAL at 08:08

## 2023-08-07 RX ADMIN — LOSARTAN POTASSIUM 25 MG: 25 TABLET, FILM COATED ORAL at 11:08

## 2023-08-07 RX ADMIN — ASPIRIN 81 MG CHEWABLE TABLET 81 MG: 81 TABLET CHEWABLE at 08:08

## 2023-08-07 RX ADMIN — Medication 400 MG: at 08:08

## 2023-08-07 RX ADMIN — METOPROLOL SUCCINATE 12.5 MG: 25 TABLET, EXTENDED RELEASE ORAL at 11:08

## 2023-08-07 RX ADMIN — FUROSEMIDE 40 MG: 40 TABLET ORAL at 11:08

## 2023-08-07 NOTE — PLAN OF CARE
Problem: Physical Therapy  Goal: Physical Therapy Goal  Description: Goals to be met by: 23     Patient will increase functional independence with mobility by performin. Supine to sit with Modified [place]  2. Sit to stand transfer with [place]  3. Gait  x 150 feet with Supervision using AD if needed..     Outcome: Ongoing, Progressing   Evaluation completed and goals appropriate. 2023

## 2023-08-07 NOTE — PLAN OF CARE
[facility] - Cardiology Stepdown  Discharge Final Note    Primary Care Provider: Jasbir Haney MD    Expected Discharge Date: 8/7/2023    Final Discharge Note (most recent)       Final Note - 08/07/23 1450          Final Note    Assessment Type Final Discharge Note     Anticipated Discharge Disposition Home or Self Care     Hospital Resources/Appts/Education Provided Appointments scheduled and added to AVS                   Anastacia Mesa LMSW  [facility]  [id number]      Future Appointments   Date Time Provider Department Center   8/16/2023 10:00 AM Jasbir Haney MD [facility] IM [place]   8/22/2023  9:45 AM [facility] FRA CAR [facility]   8/22/2023 10:00 AM Lul Bowser, NP [facility] ORTHO [facility]   8/30/2023  9:00 AM Daphney Valentin MD [facility] ID [place]   8/30/2023  3:00 PM Adelaide Aguilar MD [facility] CARDIO [place]   9/6/2023  9:00 AM Chelsey Hernandez, PT [facility] REHABOP [facility]       Contact Info       [facility] CARDIOLOGY   Specialty: Cardiology    [address]  [place] [address]   Phone: [phone]       Next Steps: Follow up    Jasbir Haney MD   Specialty: Internal Medicine   Relationship: PCP - General  Consulting Physician  Hypertension Digital Medicine Responsible Provider  Diabetes Digital Medicine Responsible Provider    [address]  [place] [address]   Phone: [phone]       Next Steps: Follow up in 1 week(s)

## 2023-08-07 NOTE — ASSESSMENT & PLAN NOTE
-rev'd CT scan reports and d/w hepatology who recommend biopsy inpatient vs outpatient  -consulted ID, awaiting ID input, if ID is also ok with biopsy outpatient can set it up outpatient   -IR biopsy of lesions outpatient

## 2023-08-07 NOTE — NURSING
Home Oxygen Evaluation    Date Performed: 2023    1) Patient's Home O2 Sat on room air, while at rest: 94%         If O2 sats on room air at rest are 88% or below, patient qualifies. No additional testing needed. Document N/A in steps 2 and 3. If 89% or above, complete steps 2.      2) Patient's O2 Sat on room air while exercisin%        If O2 sats on room air while exercising remain 89% or above patient does not qualify, no further testing needed Document N/A in step 3. If O2 sats on room air while exercising are 88% or below, continue to step 3.      3) Patient's O2 Sat while exercising on O2: 91 at 0 LPM         (Must show improvement from #2 for patients to qualify)    If O2 sats improve on oxygen, patient qualifies for portable oxygen. If not, the patient does not qualify.       6 min walk test done per RN Yareli Dominguez

## 2023-08-07 NOTE — TELEPHONE ENCOUNTER
" Message  Received: Today  Sol Gaona MD  [facility]  Caller: Unspecified (Today,  7:39 AM)  Procedure: Colonoscopy     Diagnosis: Screening colonoscopy     Procedure Timin-4 weeks     *If within 4 weeks selected, please miya as high priority*     *If greater than 12 weeks, please select "5-12 weeks" and delay sending until 2 months prior to requested date*     Provider: Any GI provider     Location: No Preference     Additional Scheduling Information: No scheduling concerns     Prep Specifications:Standard prep     Have you attached a patient to this message: yes    "

## 2023-08-07 NOTE — ASSESSMENT & PLAN NOTE
Recent (6/2023) suspected biliary pancreatitis and biliary stricture treated with biliary sphincterotomy, biliary stent, and cholecystectomy.  CT A/P with new innumerable hepatic lesions:  1. Innumerable low attenuating hepatic lesions, new since the previous exam.  Malignancy remains a concern however given short-term development, correlation is needed to exclude multifocal infection/abscess in this patient status post bile duct stent placement and cholecystectomy.  Please note, there is a low attenuating focus within the gallbladder fossa, similar to the foci throughout the hepatic parenchyma..  2. Pancreatic ductal dilatation up to 4 mm, minimally increased compared to prior.  There is fullness of the pancreatic head, underlying mass is not excluded, correlation with recent ERCP advised.  3. Simple and complex bilateral renal cysts.  4. Biliary stent in place with expected pneumobilia.  5. Cholecystectomy with scattered fluid about the gallbladder fossa.  6. Moderate stool throughout the colon, may reflect developing constipation.  7.  Additional findings as above.       - concern for lesions seen on CT A/P  - hepatology consulted, appreciate assistance  - serial abdominal exams  - Tumor markers unremarkable  - concern for possible abscesses in liver, ID following  - IR biopsy outpatient on discharge with patient needing to hold DAPT for 5 days prior to biopsy per IR

## 2023-08-07 NOTE — NURSING
Pt is being discharged. Discharge teaching on follow up appointments, new meds to take and stop taking, follow up appointments, signs and symptoms, and when to follow up with MD. Pt verbalized understanding. IV taken out, telemetry off. Pt tolerated well. Pt will return home via private vehicle. Pt stable at time of discharge. No signs or symptoms of distress noted nor any complaints there of. Will continue to monitor until pt off of unit.

## 2023-08-07 NOTE — DISCHARGE SUMMARY
[facility] - Cardiology Stepdown  Cardiology  Discharge Summary      Patient Name: Mariann Huff  MRN: [id number]  Admission Date: 8/2/2023  Hospital Length of Stay: 5 days  Discharge Date and Time:  08/07/2023 12:48 PM  Attending Physician: Miguel Vora MD    Discharging Provider: Blayne Serrano MD  Primary Care Physician: Jasbir Haney MD    HPI:   Mariann Huff is a 63 y.o. female with CAD s/p GINGER to ostial LAD in 2014, HTN, HLD, DM2, MURTAZA who presented to the ED with vomiting x 1 day. She also feels tired and her appetite lately has decreased. She reports losing weight as well. No cardiac symptoms at this time. No CP or SOB. Her EKG in the ED showed transient ST depressions in septal leads. Trop went up to 0.3 -> 2.2. Echo in the ED by me showed new EF drop to about 40% with regional WMA in anterolateral, apical and anterior segments. She also has ischemic MR likely from tethering of posterior leaflet and a possible LV thrombus however formal echo with contrast to be done tomorrow. CVP 3 by echo and she has normal BP and HR at this time. She denies any chest symptoms. Prior Cath in 2014 with LAD 99% lesion which was stented, lcx 95%, ramus 80%; no recent stress tests.    Accession #: [id number]  Transthoracic echo (TTE) 5/26/2023   The estimated ejection fraction is 55%.   The quantitatively derived ejection fraction is 52%.   Normal right ventricular size with normal right ventricular systolic function.   Normal left ventricular diastolic function.   The left ventricle is normal in size with normal systolic function.   Normal central venous pressure (3 mmHg).      Patient with C on 8/3 with multivessel disease with no significant stenosis and plan for medical management without stents placed.  Transferred to CCU service for continued medical management.        Procedure(s) (LRB):  Angiogram, Coronary, with Left Heart Cath (N/A)  Ventriculogram, Left  INSERTION, CATHETER, RIGHT HEART (Right)      Indwelling Lines/Drains at time of discharge:  Lines/Drains/Airways     None                 Hospital Course:  Underwent PCI on 8/3 with Ramus stenosis of 70%, ostial Cx to proximal Cx 99%, LV end diastolic pressure 15.  Decision for medical management with ASA, Brillinta, statin and tirofiban gtt for Cx lesion with consideration of XRT to Ramus pending liver work up.  Patient found to have multiple lesions in liver.  Hepatology consulted and following.  Tumor markers and CT scan ordered.  Overnight of 8/3, patient with somnolence and difficult to arouse with MAP 48-50, normal HR.  Spontaneouly awoke without intervention.  PaO2 at that time was 51 and subsequently placed on supplemental O2, unclear if arterial or venous gas.  CTH negative.  EEG ordered.  Patient with history of sleep apnea, CPAP qhs ordered (not on CPAP at home).  Stepdown to floor on 8/5.  PT/OT ordered.  Patient with concern for possible abscesses in liver requiring IR Bx.  IR would like patient to be off DAPT for 5 days prior to procedure.  ID, hepatology, and IR following and planning on outpatient follow up and IR biopsy.  Strict return precautions if starting to have systemic symptoms.  Started on GDMT and patient to follow up in clinic.      Goals of Care Treatment Preferences:  Code Status: Full Code      Consults:   Consults (From admission, onward)        Status Ordering Provider     Inpatient consult to Infectious Diseases  Once        Provider:  (Not yet assigned)    Completed ARMANDO ADAMES     Inpatient consult to Registered Dietitian/Nutritionist  Once        Provider:  (Not yet assigned)    Completed CHRISTINE HENRY     Inpatient consult to Midline team  Once        Provider:  (Not yet assigned)    Completed LUIS KNOWLES     Inpatient consult to Midline team  Once        Provider:  (Not yet assigned)    Completed DOROTHY PAIZ     Inpatient consult to Hepatology  Once        Provider:  (Not yet assigned)     Completed ELI ROLAND     Inpatient consult to Interventional Cardiology  Once        Provider:  (Not yet assigned)    Completed ROBERT MCDONALD     Inpatient consult to Cardiology  Once        Provider:  (Not yet assigned)    ROBRET Wang          Significant Diagnostic Studies: Labs: All labs within the past 24 hours have been reviewed    Pending Diagnostic Studies:     Procedure Component Value Units Date/Time    APTT [[id number]]     Order Status: Sent Lab Status: No result     Specimen: Blood     Alkaline phosphatase, bone specific [[id number]] Collected: 08/06/23 1106    Order Status: Sent Lab Status: In process Updated: 08/06/23 1127    Specimen: Blood           Final Active Diagnoses:    Diagnosis Date Noted POA    PRINCIPAL PROBLEM:  NSTEMI (non-ST elevated myocardial infarction) [I21.4] 08/02/2023 Yes    Healthcare maintenance [Z00.00] 08/07/2023 Not Applicable    Anemia [D64.9] 08/02/2023 Yes    Liver lesion [K76.9] 08/02/2023 Yes    History of pancreatitis [Z87.19] 06/10/2023 Not Applicable    Type 2 diabetes mellitus with chronic kidney disease [E11.22] 06/09/2023 Yes    PAD (peripheral artery disease) [I73.9] 05/04/2022 Yes     Chronic    Acute systolic congestive heart failure [I50.21] 06/13/2019 Unknown    (HFpEF) heart failure with preserved ejection fraction [I50.30] 06/06/2019 Yes     Chronic    Diabetic peripheral neuropathy associated with type 2 diabetes mellitus [E11.42] 02/24/2016 Yes    Carotid stenosis, bilateral [I65.23] 10/24/2012 Yes     Chronic    Sleep apnea [G47.30]  Yes     Chronic    CAD (coronary artery disease) [I25.10]  Yes     Chronic    Hypertension associated with diabetes [E11.59, I15.2]  Yes     Chronic    Hyperlipidemia associated with type 2 diabetes mellitus [E11.69, E78.5]  Yes     Chronic      Problems Resolved During this Admission:     No new Assessment & Plan notes have been filed under this hospital service since the last  note was generated.  Service: Cardiology      Discharged Condition: stable    Disposition: Home or Self Care    Follow Up:   Follow-up Information     [facility] CARDIOLOGY Follow up.    Specialty: Cardiology  Contact information:  [address]  [facility] [address]  [phone]           Jasbir Haney MD Follow up in 1 week(s).    Specialty: Internal Medicine  Contact information:  [address] [facility]  [place] [address]  [phone]                       Patient Instructions:      Ambulatory referral/consult to Endo Procedure    Standing Status: Future   Referral Priority: Routine Referral Type: Consultation   Number of Visits Requested: 1     Ambulatory referral/consult to Interventional Radiology   Standing Status: Future   Referral Priority: Routine Referral Type: Consultation   Referral Reason: Specialty Services Required   Requested Specialty: Interventional Radiology   Number of Visits Requested: 1     Ambulatory referral/consult to Sleep Disorders   Standing Status: Future   Referral Priority: Routine Referral Type: Consultation   Requested Specialty: Sleep Medicine   Number of Visits Requested: 1     Medications:  Reconciled Home Medications:      Medication List      START taking these medications    furosemide 40 MG tablet  Commonly known as: LASIX  Take 1 tablet (40 mg total) by mouth once daily.     losartan 25 MG tablet  Commonly known as: COZAAR  Take 1 tablet (25 mg total) by mouth once daily.     magnesium oxide 400 mg (241.3 mg magnesium) tablet  Commonly known as: MAG-OX  Take 1 tablet (400 mg total) by mouth 2 (two) times daily.     metoprolol succinate 25 MG 24 hr tablet  Commonly known as: TOPROL-XL  Take 0.5 tablets (12.5 mg total) by mouth once daily.     spironolactone 25 MG tablet  Commonly known as: ALDACTONE  Take 0.5 tablets (12.5 mg total) by mouth once daily.     ticagrelor 90 mg tablet  Commonly known as: BRILINTA  Take 1 tablet (90 mg total) by mouth 2  "(two) times daily.        CHANGE how you take these medications    apixaban 5 mg Tab  Commonly known as: ELIQUIS  Take 1 tablet (5 mg total) by mouth 2 (two) times daily.  What changed:   · medication strength  · how much to take        CONTINUE taking these medications    ACCU-CHEK EDIN PLUS METER Misc  Generic drug: blood-glucose meter     acetaminophen 650 MG Tbsr  Commonly known as: TYLENOL  Take 1 tablet (650 mg total) by mouth every 8 (eight) hours.     albuterol 90 mcg/actuation inhaler  Commonly known as: PROVENTIL/VENTOLIN HFA  Inhale 2 puffs into the lungs every 6 (six) hours as needed for Wheezing.     atorvastatin 40 MG tablet  Commonly known as: LIPITOR  Take 1 tablet (40 mg total) by mouth every evening.     blood sugar diagnostic Strp  Commonly known as: ACCU-CHEK EDIN PLUS TEST STRP  TEST BLOOD SUGARS 4 TIMES DAILY     EScitalopram oxalate 10 MG tablet  Commonly known as: LEXAPRO  Take 1 tablet (10 mg total) by mouth once daily.     glipiZIDE 10 MG Tr24  Commonly known as: GLUCOTROL  Take 1 tablet (10 mg total) by mouth daily with breakfast.     LEVEMIR FLEXPEN 100 unit/mL (3 mL) Inpn pen  Generic drug: insulin detemir U-100 (Levemir)  Inject 14 Units into the skin every evening.     multivitamin per tablet  Commonly known as: THERAGRAN  Take 1 tablet by mouth once daily.     oxyCODONE 5 MG immediate release tablet  Commonly known as: ROXICODONE  Take 1 tablet (5 mg total) by mouth every 4 (four) hours as needed for Pain.     * pen needle, diabetic 32 gauge x 1/5" Ndle  Commonly known as: NOVOTWIST  Use 1 needle to inject insulin four times daily     * BD ULTRA-FINE GRABIEL PEN NEEDLE 32 gauge x 5/32" Ndle  Generic drug: pen needle, diabetic  Use as directed         * This list has 2 medication(s) that are the same as other medications prescribed for you. Read the directions carefully, and ask your doctor or other care provider to review them with you.            STOP taking these medications  "   amLODIPine 10 MG tablet  Commonly known as: NORVASC     aspirin 81 mg Tab     celecoxib 200 MG capsule  Commonly known as: CeleBREX     cilostazoL 100 MG Tab  Commonly known as: PLETAL     hydrALAZINE 25 MG tablet  Commonly known as: APRESOLINE     isosorbide mononitrate 10 mg tablet  Commonly known as: ISMO,MONOKET     metoprolol tartrate 50 MG tablet  Commonly known as: LOPRESSOR        ASK your doctor about these medications    diclofenac sodium 1 % Gel  Commonly known as: VOLTAREN  Apply 2 g topically 3 (three) times daily. Apply to the area of pain 2-3x per day or night as needed     FARXIGA 10 mg tablet  Generic drug: dapagliflozin propanediol  Take 1 tablet (10 mg total) by mouth once daily.     gabapentin 300 MG capsule  Commonly known as: NEURONTIN  Take 1 capsule (300 mg) in the morning and 2 capsules (600 mg) in the evening            Time spent on the discharge of patient: 36 minutes    Blayne Serrano MD  Cardiology  [facility] - Cardiology Stepdown

## 2023-08-07 NOTE — ASSESSMENT & PLAN NOTE
Home regimen of amlodipine, Lopressor, hydralazine  - Starting GDMT as tolerated with Toprol, losartan, and aldactone

## 2023-08-07 NOTE — SUBJECTIVE & OBJECTIVE
Interval History: No acute events overnight.  Feeling well this am.  Still requiring supplemental O2 with improvement in CXR s/p Lasix.  6MWT today.      Review of Systems   Constitutional: Negative for chills and fever.   HENT: Negative.     Cardiovascular: Negative.  Negative for chest pain, leg swelling and palpitations.   Respiratory: Negative.  Negative for cough and shortness of breath.    Musculoskeletal: Negative.    Gastrointestinal: Negative.  Negative for abdominal pain, nausea and vomiting.   Genitourinary: Negative.    Neurological: Negative.      Objective:     Vital Signs (Most Recent):  Temp: 98 °F (36.7 °C) (08/07/23 0743)  Pulse: 82 (08/07/23 0743)  Resp: 18 (08/07/23 0743)  BP: 130/80 (08/07/23 0743)  SpO2: 100 % (08/07/23 0743) Vital Signs (24h Range):  Temp:  [97.3 °F (36.3 °C)-98.8 °F (37.1 °C)] 98 °F (36.7 °C)  Pulse:  [71-90] 82  Resp:  [16-18] 18  SpO2:  [95 %-100 %] 100 %  BP: (121-141)/(60-80) 130/80     Weight: 58.1 kg (128 lb)  Body mass index is 21.97 kg/m².     SpO2: 100 %         Intake/Output Summary (Last 24 hours) at 8/7/2023 1107  Last data filed at 8/6/2023 2000  Gross per 24 hour   Intake 720 ml   Output --   Net 720 ml       Lines/Drains/Airways       Peripheral Intravenous Line  Duration                  Peripheral IV - Single Lumen 08/02/23 0000 20 G Anterior;Proximal;Right Forearm 5 days         Peripheral IV - Single Lumen 08/04/23 0845 18 G;1 3/4 in Anterior;Right Upper Arm 3 days                       Physical Exam  Vitals and nursing note reviewed.   Constitutional:       General: She is not in acute distress.     Appearance: She is not toxic-appearing or diaphoretic.   HENT:      Head: Normocephalic and atraumatic.      Mouth/Throat:      Mouth: Mucous membranes are moist.      Pharynx: Oropharynx is clear.   Eyes:      Extraocular Movements: Extraocular movements intact.      Conjunctiva/sclera: Conjunctivae normal.   Cardiovascular:      Rate and Rhythm: Normal rate  and regular rhythm.   Pulmonary:      Effort: Pulmonary effort is normal. No respiratory distress.      Breath sounds: No wheezing.   Abdominal:      General: There is no distension.      Palpations: Abdomen is soft.      Tenderness: There is no abdominal tenderness. There is no guarding.   Musculoskeletal:         General: No tenderness. Normal range of motion.      Cervical back: Normal range of motion and neck supple.      Right lower leg: No edema.      Left lower leg: No edema.   Skin:     General: Skin is warm and dry.   Neurological:      Mental Status: She is alert and oriented to person, place, and time. Mental status is at baseline.      Cranial Nerves: No dysarthria.   Psychiatric:         Mood and Affect: Mood normal.         Behavior: Behavior normal.            Significant Labs: All pertinent lab results from the last 24 hours have been reviewed.    Significant Imaging:  All relevant imaging reviewed

## 2023-08-07 NOTE — PROGRESS NOTES
[facility] - Cardiology Stepdown  Cardiology  Progress Note    Patient Name: Mariann Huff  MRN: [id number]  Admission Date: 8/2/2023  Hospital Length of Stay: 5 days  Code Status: Full Code   Attending Physician: Miguel Vora MD   Primary Care Physician: Jasbir Haney MD  Expected Discharge Date: 8/5/2023  Principal Problem:NSTEMI (non-ST elevated myocardial infarction)    Subjective:     Hospital Course:   Underwent PCI on 8/3 with Ramus stenosis of 70%, ostial Cx to proximal Cx 99%, LV end diastolic pressure 15.  Decision for medical management with ASA, Brillinta, statin and tirofiban gtt for Cx lesion with consideration of XRT to Ramus pending liver work up.  Patient found to have multiple lesions in liver.  Hepatology consulted and following.  Tumor markers and CT scan ordered.  Overnight of 8/3, patient with somnolence and difficult to arouse with MAP 48-50, normal HR.  Spontaneouly awoke without intervention.  PaO2 at that time was 51 and subsequently placed on supplemental O2, unclear if arterial or venous gas.  CTH negative.  EEG ordered.  Patient with history of sleep apnea, CPAP qhs ordered (not on CPAP at home).  Stepdown to floor on 8/5.  PT/OT ordered.  Patient with concern for possible abscesses in liver requiring IR Bx.  IR would like patient to be off DAPT for 5 days prior to procedure.  ID, hepatology, and IR following and planning on outpatient follow up and IR biopsy.  Strict return precautions if starting to have systemic symptoms.  Started on GDMT and patient to follow up in clinic.      Interval History: No acute events overnight.  Feeling well this am.  Still requiring supplemental O2 with improvement in CXR s/p Lasix.  6MWT today.      Review of Systems   Constitutional: Negative for chills and fever.   HENT: Negative.     Cardiovascular: Negative.  Negative for chest pain, leg swelling and palpitations.   Respiratory: Negative.  Negative for cough and shortness of breath.     Musculoskeletal: Negative.    Gastrointestinal: Negative.  Negative for abdominal pain, nausea and vomiting.   Genitourinary: Negative.    Neurological: Negative.      Objective:     Vital Signs (Most Recent):  Temp: 98 °F (36.7 °C) (08/07/23 0743)  Pulse: 82 (08/07/23 0743)  Resp: 18 (08/07/23 0743)  BP: 130/80 (08/07/23 0743)  SpO2: 100 % (08/07/23 0743) Vital Signs (24h Range):  Temp:  [97.3 °F (36.3 °C)-98.8 °F (37.1 °C)] 98 °F (36.7 °C)  Pulse:  [71-90] 82  Resp:  [16-18] 18  SpO2:  [95 %-100 %] 100 %  BP: (121-141)/(60-80) 130/80     Weight: 58.1 kg (128 lb)  Body mass index is 21.97 kg/m².     SpO2: 100 %         Intake/Output Summary (Last 24 hours) at 8/7/2023 1107  Last data filed at 8/6/2023 2000  Gross per 24 hour   Intake 720 ml   Output --   Net 720 ml       Lines/Drains/Airways       Peripheral Intravenous Line  Duration                  Peripheral IV - Single Lumen 08/02/23 0000 20 G Anterior;Proximal;Right Forearm 5 days         Peripheral IV - Single Lumen 08/04/23 0845 18 G;1 3/4 in Anterior;Right Upper Arm 3 days                       Physical Exam  Vitals and nursing note reviewed.   Constitutional:       General: She is not in acute distress.     Appearance: She is not toxic-appearing or diaphoretic.   HENT:      Head: Normocephalic and atraumatic.      Mouth/Throat:      Mouth: Mucous membranes are moist.      Pharynx: Oropharynx is clear.   Eyes:      Extraocular Movements: Extraocular movements intact.      Conjunctiva/sclera: Conjunctivae normal.   Cardiovascular:      Rate and Rhythm: Normal rate and regular rhythm.   Pulmonary:      Effort: Pulmonary effort is normal. No respiratory distress.      Breath sounds: No wheezing.   Abdominal:      General: There is no distension.      Palpations: Abdomen is soft.      Tenderness: There is no abdominal tenderness. There is no guarding.   Musculoskeletal:         General: No tenderness. Normal range of motion.      Cervical back: Normal range  of motion and neck supple.      Right lower leg: No edema.      Left lower leg: No edema.   Skin:     General: Skin is warm and dry.   Neurological:      Mental Status: She is alert and oriented to person, place, and time. Mental status is at baseline.      Cranial Nerves: No dysarthria.   Psychiatric:         Mood and Affect: Mood normal.         Behavior: Behavior normal.            Significant Labs: All pertinent lab results from the last 24 hours have been reviewed.    Significant Imaging:  All relevant imaging reviewed    Assessment and Plan:       * NSTEMI (non-ST elevated myocardial infarction)  Patient presented with concerns for NSTEMI, admitted to hospital medicine.  C on 8/3 with ramus lesion of 70%, ostial CX to Prox Cx lesion was 99% stenosed.  EF calculated to be 45%.      - Medical management with ASA/Brillinta, statin  - tirofiban gtt was used x 18h, with noted LCx thrombus  - Repatha on discharge  - IC following  - Consider XRT to ramus ISR at later time  - PT/OT consulted    Liver lesion  -rev'd CT scan reports and d/w hepatology who recommend biopsy inpatient vs outpatient  -consulted ID, awaiting ID input, if ID is also ok with biopsy outpatient can set it up outpatient   -IR biopsy of lesions outpatient    Anemia  Hgb around baseline 7-8  Trend Hgb  Transfuse if Hgb <7  Iron studies with increased ferritin likely anemia of chronic disease however having inciting factor of acute blood loss with drop in Hgb to now baseline around cholecystectomy.  Starting iron supplementation    History of pancreatitis  Recent (6/2023) suspected biliary pancreatitis and biliary stricture treated with biliary sphincterotomy, biliary stent, and cholecystectomy.  CT A/P with new innumerable hepatic lesions:  1. Innumerable low attenuating hepatic lesions, new since the previous exam.  Malignancy remains a concern however given short-term development, correlation is needed to exclude multifocal infection/abscess in  this patient status post bile duct stent placement and cholecystectomy.  Please note, there is a low attenuating focus within the gallbladder fossa, similar to the foci throughout the hepatic parenchyma..  2. Pancreatic ductal dilatation up to 4 mm, minimally increased compared to prior.  There is fullness of the pancreatic head, underlying mass is not excluded, correlation with recent ERCP advised.  3. Simple and complex bilateral renal cysts.  4. Biliary stent in place with expected pneumobilia.  5. Cholecystectomy with scattered fluid about the gallbladder fossa.  6. Moderate stool throughout the colon, may reflect developing constipation.  7.  Additional findings as above.       - concern for lesions seen on CT A/P  - hepatology consulted, appreciate assistance  - serial abdominal exams  - Tumor markers unremarkable  - concern for possible abscesses in liver, ID following  - IR biopsy outpatient on discharge with patient needing to hold DAPT for 5 days prior to biopsy per IR    Type 2 diabetes mellitus with chronic kidney disease  -Last A1c reviewed-   Lab Results   Component Value Date    HGBA1C 8.8 (H) 07/10/2023       Home Antihyperglycemic Regiment:  -Farxiga, glipizide, insulin daily at home    Inpatient Antihyperglycemic Regiment:  Antihyperglycemics (From admission, onward)    Start     Stop Route Frequency Ordered    08/02/23 2326  insulin aspart U-100 pen 0-5 Units         -- SubQ Every 6 hours PRN 08/02/23 2227        - Titrate and addition of insulin as needed for BG goal 140-180  - SSI with POCT accuchecks ACHS and Diabetic diet 2000 beti  - Diabetic nutritional counseling given     Blood Sugars (AccuCheck):  Recent Labs     08/02/23  2303   POCTGLUCOSE 188*         PAD (peripheral artery disease)  - reports no longer taking cilostazol  - continue ASA and statin      Acute systolic congestive heart failure  CXR with B/L edema    EF decreased to 40-45%  S/p IV Lasix  Starting daily PO Lasix 40mg      Diabetic peripheral neuropathy associated with type 2 diabetes mellitus  Continue home gabapentin    Carotid stenosis, bilateral  See NSTEMI    Sleep apnea  Overnight of 8/3, patient with somnolence and difficult to arouse with MAP 48-50, normal HR.  Spontaneouly awoke without intervention.  PaO2 at that time was 51 and subsequently placed on supplemental O2, unclear if arterial or venous gas.  - CTH negative  - F/u EEG  Not using CPAP at home.  Outpatient f/u and sleep medicine referral needed  - CPAP qhs      CAD (coronary artery disease)  See NSTEMI for assessment and plan  Home meds include ASA 81, atorva 40, Eliquis 2.5 (for DVT but also documented as being for stents), was on cilostazol at one point but reports no longer taking      Hyperlipidemia associated with type 2 diabetes mellitus  Continue home lipitor    Hypertension associated with diabetes  Home regimen of amlodipine, Lopressor, hydralazine  - Starting GDMT as tolerated with Toprol, losartan, and aldactone        VTE Risk Mitigation (From admission, onward)         Ordered     enoxaparin injection 40 mg  Every 24 hours         08/04/23 0943     IP VTE HIGH RISK PATIENT  Once         08/02/23 2147     Place sequential compression device  Until discontinued         08/02/23 2147                Blayne Serrano MD  Cardiology  [facility] - Cardiology Stepdown

## 2023-08-07 NOTE — PT/OT/SLP EVAL
Physical Therapy Evaluation and treatment     Patient Name:  Mariann Huff   MRN:  [id number]    Recommendations:     Discharge Recommendations: home   Discharge Equipment Recommendations: none   Barriers to discharge: None    Assessment:     Mariann Huff is a 63 y.o. female admitted with a medical diagnosis of NSTEMI (non-ST elevated myocardial infarction).  She presents with the following impairments/functional limitations: impaired balance, decreased safety awareness, impaired endurance, impaired functional mobility, gait instability pt tolerated treatment well but nausea and vomiting limited functional mobility. Pt will benefit from skilled PT 3x/wk to progress physically. Pt will be able to discharge home with no needs when medically stable. Pt came to ED with c/o abdominal pain, N&V.    Rehab Prognosis: Good; patient would benefit from acute skilled PT services to address these deficits and reach maximum level of function.    Recent Surgery: Procedure(s) (LRB):  Angiogram, Coronary, with Left Heart Cath (N/A)  Ventriculogram, Left  INSERTION, CATHETER, RIGHT HEART (Right) 4 Days Post-Op    Plan:     During this hospitalization, patient to be seen 3 x/week to address the identified rehab impairments via gait training, therapeutic activities and progress toward the following goals:    Plan of Care Expires:  09/04/23    Subjective     Chief Complaint: pt c/o nausea during treatment.   Patient/Family Comments/goals:  to go home.   Pain/Comfort:  Pain Rating 1: 0/10  Pain Rating Post-Intervention 1: 0/10    Patients cultural, spiritual, Confucianist conflicts given the current situation: no    Living Environment:  Pt is retired and lives in [address] [place] [address] with her  who works full-time.   Prior to admission, patients level of function was modified Independent using hurrycane PRN.  Equipment used at home: bedside commode (walk in shower with built in seat, grab bars in shower, hurrycane, transport chair).   DME owned (not currently used): none.  Upon discharge, patient will have assistance from  who can take time off. .    Objective:     Communicated with nurse  prior to session.  Patient found supine with telemetry, oxygen (hep lock IV)  upon PT entry to room.    General Precautions: Standard, fall  Orthopedic Precautions:    Braces:    Respiratory Status: Room air    Exams:  Cognitive Exam:  Patient is oriented to Person, Place, Time, and Situation  RLE ROM: WFL  RLE Strength: WFL  LLE ROM: WFL  LLE Strength: WFL    Functional Mobility:  Bed Mobility:     Rolling Left:  supervision  Supine to Sit: supervision    Transfers:     Sit to Stand:  stand by assistance with no AD    Gait: pt received gait training ~ 32 ft with O2 intact and CGA. Pt got nauseated using toilet and vomited. Distance pt gait trained limited by vomiting.     Balance: pt stood at sink and performed grooming tasks with SBA.     Pt white board updated with current therapists name and level of mobility assistance needed.       AM-PAC 6 CLICK MOBILITY  Total Score:18       Treatment & Education:  Pt received verbal instructions in role of PT and POC. Pt verbally expressed understanding of such.     Patient left up in chair with all lines intact, call button in reach, and RN notified that pt vomited.     GOALS:   Multidisciplinary Problems       Physical Therapy Goals          Problem: Physical Therapy    Goal Priority Disciplines Outcome Goal Variances Interventions   Physical Therapy Goal     PT, PT/OT Ongoing, Progressing     Description: Goals to be met by: 23     Patient will increase functional independence with mobility by performin. Supine to sit with Modified [place]  2. Sit to stand transfer with [place]  3. Gait  x 150 feet with Supervision using AD if needed..                          History:     Past Medical History:   Diagnosis Date    Anticoagulant long-term use     Asthma     Back pain     Bradycardia,  unspecified 2019    The etiology of the bradycardic episode is unclear.  The have appear to be respiratory in origin (at least the 1st episode).  We will continue to monitor carefully.  We are awaiting evaluation by Cardiology.      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 CATH LAB;  Service: Cardiology;  Laterality: N/A;    ANTEGRADE NEPHROSTOGRAPHY Left 2019    Procedure: Nephrostogram - antegrade;  Surgeon: Robin Boyd MD;  Location: [facility] OR [address] [place] [address];  Service: Urology;  Laterality: Left;    BRONCHOSCOPY N/A 2019    Procedure: BRONCHOSCOPY;  Surgeon: Sean Ruano MD;  Location: [facility] OR [address] [place] [address];  Service: General;  Laterality: N/A;    CARDIAC CATHETERIZATION      CATARACT EXTRACTION      cataract extraction left eye      cataracts      CAUDAL EPIDURAL STEROID INJECTION N/A 2019    Procedure: Injection-steroid-epidural-caudal;  Surgeon: Dave Bentley Jr., MD;  Location: [facility] PAIN MGT;  Service: Pain Management;  Laterality: N/A;     SECTION, LOW TRANSVERSE      COLONOSCOPY N/A 2019    Procedure:  COLONOSCOPY;  Surgeon: Al Alaniz MD;  Location: [facility] ENDO (2ND FLR);  Service: Endoscopy;  Laterality: N/A;    CORONARY ANGIOPLASTY      CYSTOGRAM N/A 12/11/2019    Procedure: CYSTOGRAM INTRAOP;  Surgeon: Robin Boyd MD;  [facility];  Service: Urology;  Laterality: N/A;  1 HOUR    CYSTOSCOPY W/ RETROGRADES Left 12/11/2019    Procedure: CYSTOSCOPY, WITH RETROGRADE PYELOGRAM;  Surgeon: Robin Boyd MD;  [facility];  Service: Urology;  Laterality: Left;  fluro    ENDOSCOPIC ULTRASOUND OF UPPER GASTROINTESTINAL TRACT N/A 6/15/2023    Procedure: ULTRASOUND, UPPER GI TRACT, ENDOSCOPIC;  Surgeon: Lisa Kim MD;  Location: [facility] ENDO (2ND FLR);  Service: Endoscopy;  Laterality: N/A;    ERCP N/A 6/15/2023    Procedure: ERCP (ENDOSCOPIC RETROGRADE CHOLANGIOPANCREATOGRAPHY);  Surgeon: Lisa Kim MD;  [facility]  Service: Endoscopy;  Laterality: N/A;    ESOPHAGOGASTRODUODENOSCOPY W/ PEG  5/2/2019    Procedure: EGD, WITH PEG TUBE INSERTION;  Surgeon: Sean Ruano MD;  [facility];  Service: General;;    EXCISION TURBINATE, SUBMUCOUS      FLEXIBLE SIGMOIDOSCOPY N/A 5/13/2019    Procedure: SIGMOIDOSCOPY, FLEXIBLE;  Surgeon: ALBERTO Amin MD;  [facility]  Service: Endoscopy;  Laterality: N/A;    FLEXIBLE SIGMOIDOSCOPY N/A 5/21/2019    Procedure: SIGMOIDOSCOPY, FLEXIBLE;  Surgeon: ALBERTO Amin MD;  [facility]  Service: Endoscopy;  Laterality: N/A;    FUSION OF LUMBAR SPINE BY ANTERIOR APPROACH Left 4/12/2019    Procedure: FUSION, SPINE, LUMBAR, ANTERIOR APPROACH Left L5-S1 Anterior to Psoa Interbody Fusion, L5-S1 Posterior Instrumentation;  Surgeon: Mk George MD;  [facility];  Service: Neurosurgery;  Laterality: Left;  Porcedure:  Left L5-S1 Anterior to Psoa Interbody Fusion, L5-S1 Posterior Instrumentation  Surgery Time: 4 Hrs  LOS: 2-3  Anesthesia: General   Blood: Type &  Screen  R    HAND SURGERY Left     HAND SURGERY Right     torn ligament repair/ Dr. Yeboah    HYSTERECTOMY      INJECTION OF STEROID Right 12/10/2020    Procedure: INJECTION, STEROID Right SI Joint Block and Steroid Injection;  Surgeon: Mk George MD;  Location: [facility];  Service: Neurosurgery;  Laterality: Right;  Procedure: Right SI Joint Block and Steroid Injection   SUrgery Time: 30 Min  LOS: 0  Anesthesia: MAC  Radiology: C-arm  Bed: Tomer 4 Poster  Position: Prone    INJECTION OF STEROID Right 9/28/2021    Procedure: INJECTION, STEROID Right SI joint block & steroid injection;  Surgeon: Mk George MD;  Location: [facility] OR;  Service: Neurosurgery;  Laterality: Right;  Procedure: Right SI joint block & steroid injection  Surgery Time: 30m  Anesthesia: Local MAC  Radiology: C-arm  Bed: Regular  Position: Prone    LAPAROSCOPIC CHOLECYSTECTOMY N/A 6/23/2023    Procedure: CHOLECYSTECTOMY, LAPAROSCOPIC;  Surgeon: Richard Penny MD;  Location: [address];  Service: General;  Laterality: N/A;    left foot surgery      left wrist surgery      LYSIS OF ADHESIONS N/A 2/19/2020    Procedure: LYSIS, ADHESIONS;  Surgeon: Robin Boyd MD;  Location: [address];  Service: Urology;  Laterality: N/A;    NASAL SEPTUM SURGERY  5/7/15    OPEN REDUCTION AND INTERNAL FIXATION (ORIF) OF FRACTURE OF PROXIMAL HUMERUS Left 7/12/2023    Procedure: ORIF, FRACTURE, HUMERUS, PROXIMAL ;  Surgeon: Tomasz Leary MD;  Location: [address];  Service: Orthopedics;  Laterality: Left;    PERCUTANEOUS NEPHROSTOMY Left 4/21/2019    Procedure: Creation, Nephrostomy, Percutaneous;  Surgeon: Karina Surgeon;  [facility];  Service: Anesthesiology;  Laterality: Left;    REPAIR OF URETER  4/12/2019    Procedure: REPAIR, URETER;  Surgeon: Mk George MD;  Location: [address];  Service: Neurosurgery;;    REPLACEMENT OF NEPHROSTOMY TUBE N/A 7/18/2019    Procedure: REPLACEMENT, NEPHROSTOMY TUBE;   Surgeon: [facility];  [facility];  Service: Anesthesiology;  Laterality: N/A;  188    REPLACEMENT OF NEPHROSTOMY TUBE N/A 7/24/2019    Procedure: REPLACEMENT, NEPHROSTOMY TUBE;  Surgeon: [facility];  [facility];  Service: Anesthesiology;  Laterality: N/A;  188    REPLACEMENT OF NEPHROSTOMY TUBE N/A 10/7/2019    Procedure: REPLACEMENT, NEPHROSTOMY TUBE;  Surgeon: [facility];  [facility];  Service: Anesthesiology;  Laterality: N/A;  189    REPLACEMENT OF NEPHROSTOMY TUBE N/A 11/25/2019    Procedure: REPLACEMENT, NEPHROSTOMY TUBE;  Surgeon: [facility];  [facility];  Service: Anesthesiology;  Laterality: N/A;  Room 188/Bessy    REPLACEMENT OF NEPHROSTOMY TUBE Right 2/19/2020    Procedure: REPLACEMENT, NEPHROSTOMY TUBE removal removal;  Surgeon: Robin Boyd MD;  Location: [address];  Service: Urology;  Laterality: Right;    RIGHT HEART CATHETERIZATION Right 8/3/2023    Procedure: INSERTION, CATHETER, RIGHT HEART;  Surgeon: Aime George MD;  Location: [facility] CATH LAB;  Service: Cardiology;  Laterality: Right;    rt elbow surgery      S/P LAD COATED STENT  05/14/2010    6 total     S/P OM1 STENT  08/2003    SINUS SURGERY      F.E.S.S.    TRACHEOSTOMY N/A 5/2/2019    Procedure: CREATION, TRACHEOSTOMY;  Surgeon: Sean Ruano MD;  Location: [address];  Service: General;  Laterality: N/A;    TUBAL LIGATION      URETERAL REIMPLANTION Left 2/19/2020    Procedure: REIMPLANTATION, URETER WITH PSOAS HITCH;  Surgeon: Robin Boyd MD;  Location: [address];  Service: Urology;  Laterality: Left;    VENTRICULOGRAM, LEFT  8/3/2023    Procedure: Ventriculogram, Left;  Surgeon: Aime George MD;  Location: [facility] CATH LAB;  Service: Cardiology;;       Time Tracking:     PT Received On: 08/07/23  PT Start Time: 0807     PT Stop Time: 0833  PT Total Time (min): 26 min     Billable Minutes:  Evaluation 10 min  and Gait Training 16 min       08/07/2023

## 2023-08-08 ENCOUNTER — PATIENT OUTREACH (OUTPATIENT)
Dept: ADMINISTRATIVE | Facility: CLINIC | Age: 63
End: 2023-08-08
Payer: COMMERCIAL

## 2023-08-08 ENCOUNTER — TELEPHONE (OUTPATIENT)
Dept: INFECTIOUS DISEASES | Facility: CLINIC | Age: 63
End: 2023-08-08
Payer: COMMERCIAL

## 2023-08-08 ENCOUNTER — TELEPHONE (OUTPATIENT)
Dept: ENDOSCOPY | Facility: HOSPITAL | Age: 63
End: 2023-08-08
Payer: COMMERCIAL

## 2023-08-08 DIAGNOSIS — I21.4 NSTEMI (NON-ST ELEVATED MYOCARDIAL INFARCTION): Primary | ICD-10-CM

## 2023-08-08 LAB
BACTERIA BLD CULT: NORMAL
BACTERIA BLD CULT: NORMAL

## 2023-08-08 NOTE — ASSESSMENT & PLAN NOTE
63F with h/o CAD , HTN, HLD, DM2, MURTAZA,  biliary pancreatitis s/p sphincterotomy, biliary stent, cholecystectomy in 06/2023.  She was admitted with an NSTEMI.  Imaging revealed liver lesions concerning for abscess.  Patient is on plavix and there is some risk to holding plavix in order to perform IR biopsy of the liver.      Plan  1. Usual causes of liver abscesses are E coli and Klebsiella.  Will plan to treat empirically with cefpodoxime 400  po bid which will provide coverage for most E coli and Klebsiella.  2. Will arrange follow up for her in my clinic with repeat CT scan to re-assess the liver lesions.

## 2023-08-08 NOTE — TELEPHONE ENCOUNTER
----- Message from Romeo Archibald MD sent at 8/7/2023  5:49 PM CDT -----  Cancel her appointment with Dr. Mustafa and schedule her with myself for august 23.    Jesus Archibald

## 2023-08-08 NOTE — PROGRESS NOTES
C3 nurse attempted to contact Mariann Huff for a TCC post hospital discharge follow up call. No answer. Left voicemail with callback information. The patient has a scheduled HOSFU appointment with Jasbir Haney MD on 8/16/23 @ 1000.

## 2023-08-08 NOTE — PROGRESS NOTES
[facility] - Cardiology Stepdown  Infectious Disease  Progress Note    Patient Name: Mariann Huff  MRN: [id number]  Admission Date: 8/2/2023  Length of Stay: 5 days  Attending Physician: Miguel Vora MD  Primary Care Provider: Jasbir Haney MD    Isolation Status: No active isolations  Assessment/Plan:      GI  Liver lesion  63F with h/o CAD , HTN, HLD, DM2, MURTAZA,  biliary pancreatitis s/p sphincterotomy, biliary stent, cholecystectomy in 06/2023.  She was admitted with an NSTEMI.  Imaging revealed liver lesions concerning for abscess.  Patient is on plavix and there is some risk to holding plavix in order to perform IR biopsy of the liver.      Plan  1. Usual causes of liver abscesses are E coli and Klebsiella.  Will plan to treat empirically with cefpodoxime 400  po bid which will provide coverage for most E coli and Klebsiella.  2. Will arrange follow up for her in my clinic with repeat CT scan to re-assess the liver lesions.        Anticipated Disposition: TBD    Thank you for your consult. I will sign off. Please contact us if you have any additional questions.    Romeo Archibald MD  Infectious Disease  [facility] - Cardiology [facility]    Subjective:     Principal Problem:NSTEMI (non-ST elevated myocardial infarction)    HPI: 63F with h/o CAD , HTN, HLD, DM2, MURTAZA,  biliary pancreatitis s/p sphincterotomy, biliary stent, cholecystectomy in 06/2023, s/p ORIF of her left proximal humerus fracture with IM nail  7/12 re-admitted 8/2 with N/V found to have NSTEMI. Reports feeling better than admit. Denies f/c. Denies abd pain. Doesn't recall being on abx for a while. Reports L arm doing well since surgery, incision healing.     Note-  previously followed by ID in 2019 (new to me) for E coli bacteremia and Staph lugdenesis infection (abdominal source) with plan for subsequent suppressive therapy given concern for hardware involvement. Also treated in past for HAP (Pseudomonas) and candida glabrata UTI . Had a  "persistent fluid collection along inferior aspect of midline abdominal incision with surrounding inflammatory changes, IR aspiration of collection almost completely drained with few WBCs and cultures NGTD, treated with meropenem and then took cefadroxil/rifampin for unclear period of time      S/p LHC on 8/3 with multivessel disease with no significant stenosis and plan for medical management without stents placed.        Ct  Small collection near the cholecystectomy bed adjacent to the surgical clips measuring 1.3 cm (series 302, image 29).  Additional small collection, possibly contiguous or separate along the inferomedial right lobe measuring 2.1 x 1.4 cm (series 302, image 36).     Bile Ducts: Biliary stent in place with trace pneumobilia.  Some prominence of central intrahepatic ducts at the confluence with thin wall enhancement.     Spleen: Unremarkable.     Pancreas: Redemonstration of heterogeneity and fullness at the pancreatic head with area in total measuring 3.6 x 2.0 cm (series 301, image 67).  Mild dilatation of the immediate upstream pancreatic duct up to 5 mm    Afebrile    Received 3 days zosyn    ID consulted for "multiple hepatic abscesses in liver which initially were though to be mets, advice antibiotic plan please"      Interval History: No adverse events.    Review of Systems   All other systems reviewed and are negative.    Objective:     Vital Signs (Most Recent):  Temp: 97.5 °F (36.4 °C) (08/07/23 1111)  Pulse: 84 (08/07/23 1449)  Resp: 18 (08/07/23 1111)  BP: 133/64 (08/07/23 1135)  SpO2: 98 % (08/07/23 1111) Vital Signs (24h Range):  Temp:  [97.5 °F (36.4 °C)-98.1 °F (36.7 °C)] 97.5 °F (36.4 °C)  Pulse:  [71-84] 84  Resp:  [18] 18  SpO2:  [95 %-100 %] 98 %  BP: (121-133)/(63-80) 133/64     Weight: 58.1 kg (128 lb)  Body mass index is 21.97 kg/m².    Estimated Creatinine Clearance: 63 mL/min (based on SCr of 0.8 mg/dL).     Physical Exam  Vitals and nursing note reviewed.   Constitutional: "       General: She is not in acute distress.     Appearance: She is not toxic-appearing or diaphoretic.   Cardiovascular:      Rate and Rhythm: Normal rate and regular rhythm.   Pulmonary:      Effort: Pulmonary effort is normal. No respiratory distress.      Breath sounds: No wheezing.   Abdominal:      General: There is no distension.      Palpations: Abdomen is soft.      Tenderness: There is no guarding.      Comments: Nonacute abdominal exam   Musculoskeletal:      Right lower leg: No edema.      Left lower leg: No edema.   Skin:     General: Skin is warm and dry.   Neurological:      Mental Status: She is alert. Mental status is at baseline.      Cranial Nerves: No dysarthria.   Psychiatric:         Mood and Affect: Mood normal.         Behavior: Behavior normal.          Significant Labs:   Microbiology Results (last 7 days)       Procedure Component Value Units Date/Time    Blood culture [[id number]] Collected: 08/02/23 2339    Order Status: Completed Specimen: Blood Updated: 08/07/23 0612     Blood Culture, Routine No Growth to date      No Growth to date      No Growth to date      No Growth to date      No Growth to date    Narrative:      Collection has been rescheduled by [email or web address] at 08/02/2023 23:27 Reason:   Unable to collect per [place]   Collection has been rescheduled by [email or web address] at 08/02/2023 23:27 Reason:   Unable to collect per [place]     Blood culture [[id number]] Collected: 08/02/23 2339    Order Status: Completed Specimen: Blood Updated: 08/07/23 0612     Blood Culture, Routine No Growth to date      No Growth to date      No Growth to date      No Growth to date      No Growth to date    Narrative:      Collection has been rescheduled by [email or web address] at 08/02/2023 23:27 Reason:   Unable to collect per [place]   Collection has been rescheduled by [email or web address] at 08/02/2023 23:27 Reason:   Unable to collect per [place]             Significant Imaging: I have reviewed all pertinent imaging results/findings within the past 24  hours.

## 2023-08-08 NOTE — SUBJECTIVE & OBJECTIVE
Interval History: No adverse events.    Review of Systems   All other systems reviewed and are negative.    Objective:     Vital Signs (Most Recent):  Temp: 97.5 °F (36.4 °C) (08/07/23 1111)  Pulse: 84 (08/07/23 1449)  Resp: 18 (08/07/23 1111)  BP: 133/64 (08/07/23 1135)  SpO2: 98 % (08/07/23 1111) Vital Signs (24h Range):  Temp:  [97.5 °F (36.4 °C)-98.1 °F (36.7 °C)] 97.5 °F (36.4 °C)  Pulse:  [71-84] 84  Resp:  [18] 18  SpO2:  [95 %-100 %] 98 %  BP: (121-133)/(63-80) 133/64     Weight: 58.1 kg (128 lb)  Body mass index is 21.97 kg/m².    Estimated Creatinine Clearance: 63 mL/min (based on SCr of 0.8 mg/dL).     Physical Exam  Vitals and nursing note reviewed.   Constitutional:       General: She is not in acute distress.     Appearance: She is not toxic-appearing or diaphoretic.   Cardiovascular:      Rate and Rhythm: Normal rate and regular rhythm.   Pulmonary:      Effort: Pulmonary effort is normal. No respiratory distress.      Breath sounds: No wheezing.   Abdominal:      General: There is no distension.      Palpations: Abdomen is soft.      Tenderness: There is no guarding.      Comments: Nonacute abdominal exam   Musculoskeletal:      Right lower leg: No edema.      Left lower leg: No edema.   Skin:     General: Skin is warm and dry.   Neurological:      Mental Status: She is alert. Mental status is at baseline.      Cranial Nerves: No dysarthria.   Psychiatric:         Mood and Affect: Mood normal.         Behavior: Behavior normal.          Significant Labs:   Microbiology Results (last 7 days)       Procedure Component Value Units Date/Time    Blood culture [[id number]] Collected: 08/02/23 1402    Order Status: Completed Specimen: Blood Updated: 08/07/23 0612     Blood Culture, Routine No Growth to date      No Growth to date      No Growth to date      No Growth to date      No Growth to date    Narrative:      Collection has been rescheduled by YOLIE at 08/02/2023 23:27 Reason:   Unable to collect per  [place]   Collection has been rescheduled by [email or web address] at 08/02/2023 23:27 Reason:   Unable to collect per [place]     Blood culture [[id number]] Collected: 08/02/23 2339    Order Status: Completed Specimen: Blood Updated: 08/07/23 0612     Blood Culture, Routine No Growth to date      No Growth to date      No Growth to date      No Growth to date      No Growth to date    Narrative:      Collection has been rescheduled by [email or web address] at 08/02/2023 23:27 Reason:   Unable to collect per [place]   Collection has been rescheduled by [email or web address] at 08/02/2023 23:27 Reason:   Unable to collect per [place]             Significant Imaging: I have reviewed all pertinent imaging results/findings within the past 24 hours.

## 2023-08-09 ENCOUNTER — TELEPHONE (OUTPATIENT)
Dept: ENDOSCOPY | Facility: HOSPITAL | Age: 63
End: 2023-08-09
Payer: COMMERCIAL

## 2023-08-09 LAB — ALP BONE SERPL-MCNC: 62.9 UG/L (ref 5.6–29)

## 2023-08-09 NOTE — PROGRESS NOTES
C3 nurse 2nd attempt to contact Mariann Huff for a TCC post hospital discharge follow up call. No answer. Left voicemail with callback information. The patient has a scheduled HOSFU appointment with Jasbir Haney MD on 8/16/23 @ 1000.

## 2023-08-09 NOTE — TELEPHONE ENCOUNTER
3rd attempt to contact the patient to schedule an endoscopy procedure(s) Colonoscopy. The patient did not answer the call and left a voice message requesting a call back.

## 2023-08-10 ENCOUNTER — PATIENT MESSAGE (OUTPATIENT)
Dept: CARDIOLOGY | Facility: CLINIC | Age: 63
End: 2023-08-10
Payer: COMMERCIAL

## 2023-08-10 ENCOUNTER — NURSE TRIAGE (OUTPATIENT)
Dept: ADMINISTRATIVE | Facility: CLINIC | Age: 63
End: 2023-08-10
Payer: COMMERCIAL

## 2023-08-10 NOTE — PHYSICIAN QUERY
PT Name: Mariann Huff  MR #: [id number]    DOCUMENTATION CLARIFICATION      CDS/: Tere Nelson rn              Contact information: [email or web address]    This form is a permanent document in the medical record.     Query Date: August 10, 2023    By submitting this query, we are merely seeking further clarification of documentation. Please utilize your independent clinical judgment when addressing the question(s) below.    The Medical Record contains the following:   Indicators   Supporting Clinical Findings Location in Medical Record   x Hypertension associated with diabetes documented Hypertension associated with diabetes   H&P   x Chronic condition(s) PAD,CHF H&P    Vital signs     x Treatment/Medication  home regimen includes amlodipine, metoprolol tartrate, and hydralazine  - per cardiology, continue amlodipine and metoprolol  H&P    Other     Provider, please clarify the relationship between the Hypertension and Diabetes Mellitus  [   ] Hypertension is a manifestation of Diabetes Mellitus (Secondary Hypertension)   [X]  Hypertension is not a manifestation of Diabetes Mellitus (Essential Hypertension)   [   ] Other Clarification (please specify): ____________       Please document in your progress notes daily for the duration of treatment, until resolved, and include in your discharge summary.

## 2023-08-10 NOTE — PHYSICIAN QUERY
PT Name: Mariann Huff  MR #: [id number]     DOCUMENTATION CLARIFICATION     CDS/: Tere Nelson rn              Contact information:[email or web address]  This form is a permanent document in the medical record.     Query Date: August 10, 2023    By submitting this query, we are merely seeking further clarification of documentation.  Please utilize your independent clinical judgment when addressing the question(s) below.    The Medical Record contains the following   Indicators Supporting Clinical Findings Location in Medical Record   x Heart Failure documented Hx HFpEF     Acute systolic congestive heart failure   H&P    Cardiology note 8-6   x BNP [id number] Cardiology note 8-6   x EF/Echo   Left Ventricle: The left ventricle is normal in size. Ventricular mass is normal. Normal wall thickness. regional wall motion abnormalities present. There is mildly reduced systolic function with a visually estimated ejection fraction of 40 - 45%.    Right Ventricle: Normal right ventricular cavity size. Systolic function is normal.    Mitral Valve: There is mild regurgitation with a centrally directed jet.    IVC/SVC: Normal venous pressure at 3 mmHg.    No LV thrombus seen, wall motion abnormalities appear to be in LCx distribution. Echo 8-3   x Radiology findings CXR with B/L edema Cardiology note 8-6    Subjective/Objective Respiratory Conditions      Recent/Current MI      Heart Transplant, LVAD      Edema, JVD      Ascites     x Diuretics/Meds IV Furosemide Mar 8-5 to 8-6    Other Treatment      Other          Heart failure is a clinical diagnosis which includes symptomatic fluid retention, elevated intracardiac pressures, and/or the inability of the heart to deliver adequate blood flow.    Heart Failure with reduced Ejection Fraction (HFrEF) or Systolic Heart Failure (loses ability to contract normally, EF is <40%)    Heart Failure with preserved Ejection Fraction (HFpEF) or Diastolic Heart Failure  (stiff ventricles, does not relax properly, EF is >50%)     Heart Failure with Combined Systolic and Diastolic Failure (stiff ventricles, does not relax properly and EF is <50%)    Mid-range or mildly reduced ejection fraction (HFmrEF) is classified as systolic heart failure.  Congestive heart failure with a recovered EF is classified as Diastolic Heart Failure.  Common clues to acute exacerbation:  Rapidly progressive symptoms (w/in 2 weeks of presentation), using IV diuretics, using supplemental O2, pulmonary edema on Xray, new or worsening pleural effusion, +JVD or other signs of volume overload, MI w/in 4 weeks, and/or BNP >500  The clinical guidelines noted are only system guidelines, and do not replace the providers clinical judgment.    Provider, please specify the diagnosis associated with the above clinical findings.    [X]  Acute Systolic Heart Failure (HFrEF or HFmrEF) - new diagnosis   [   ]  Acute on Chronic Systolic Heart Failure (HFrEF or HFmrEF) - worsening of CHF signs/symptoms in preexisting CHF   [   ]  Acute on Chronic Diastolic Heart Failure (HFpEF) - worsening of CHF signs/symptoms in preexisting CHF   [   ]  Other (please specify): ___________________________________   Please document in your progress notes daily for the duration of treatment until resolved and include in your discharge summary.    References:  American Heart Association editorial staff. (2017, May). Ejection Fraction Heart Failure Measurement. American Heart Association. https://www.heart.org/en/health-topics/heart-failure/diagnosing-heart-failure/ejection-fraction-heart-failure-measurement#:~:text=Ejection%20fraction%20(EF)%20is%20a,pushed%20out%20with%20each%20heartbeat  PÉREZ Cash (2020, December 15). Heart failure with preserved ejection fraction: Clinical manifestations and diagnosis. UpToDate.  https://www.[employer]/contents/heart-failure-with-preserved-ejection-fraction-clinical-manifestations-and-diagnosis.  ICD-10-CM/PCS Coding Clinic Third Quarter ICD-10, Effective with discharges: September 8, 2020 [facility] § Heart failure with mid-range or mildly reduced ejection fraction (2020).  ICD-10-CM/PCS Coding Clinic Third Quarter ICD-10, Effective with discharges: September 8, 2020 [facility] § Heart failure with recovered ejection fraction (2020).  Form No. 43478

## 2023-08-10 NOTE — TELEPHONE ENCOUNTER
Reason for Disposition   SEVERE vomiting (e.g., 6 or more times/day)  (Exception: Patient sounds well, is drinking liquids, does not sound dehydrated, and vomiting has lasted less than 24 hours.)    Additional Information   Negative: Shock suspected (e.g., cold/pale/clammy skin, too weak to stand, low BP, rapid pulse)   Negative: Difficult to awaken or acting confused (e.g., disoriented, slurred speech)   Negative: Sounds like a life-threatening emergency to the triager   Negative: Vomiting red blood or black (coffee ground) material   Negative: Vomiting and hernia is more painful or swollen than usual   Negative: Recent head injury (within 3 days)   Negative: Recent abdominal injury (within 7 days)   Negative: Insulin-dependent diabetes and glucose > 240 mg/dL (13 mmol/L)   Negative: Severe pain in one eye    Protocols used: Vomiting-A-OH  Spoke with pt who reports that she was discharged from hospital recently. Reports since discharge she has had nausea, and vomiting. Reports vomiting 5-6 times a day, pt also reports noting spencer sized blood clot from nose this morning.Pt advised to be seen in ED/UC. Pt verbalized understanding

## 2023-08-10 NOTE — PROGRESS NOTES
C3 nurse spoke with Mariann Huff() for a TCC post hospital discharge follow up call. The patient has a scheduled HOSFU appointment with Jasbir Haney MD on 8/16/23 @ 1000.

## 2023-08-10 NOTE — PHYSICIAN QUERY
PT Name: Mariann Huff  MR #: [id number]    DOCUMENTATION CLARIFICATION     CDS/: Tere Nelson rn            Contact information:[email or web address]    This form is a permanent document in the medical record.     Query Date: August 10, 2023    By submitting this query, we are merely seeking further clarification of documentation. Please utilize your independent clinical judgment when addressing the question(s) below.    Supporting Clinical Findings Location in Medical Record   Underwent PCI on 8/3 with Ramus stenosis of 70%, ostial Cx to proximal Cx 99%, LV end diastolic pressure 15.  Decision for medical management with ASA, Brillinta, statin and tirofiban gtt for Cx lesion with consideration of XRT to Ramus pending liver work up     Cardiology note 8-7                 NSTEMI (non-ST elevated myocardial infarction)  Patient with extensive CAD history and 2 prior stents to OM1 and LAD in 2003 and 2010, PAD, carotid stenosis presented with 1 day of vomiting, found to have new TWI in lateral leads, elevated troponon to 0.3>>2.2 concerning for NSTEMI. H&P       Provider, please clarify if there is any clinical correlation between NSTEMI and Ramus stenosis.           Are the conditions:      [X] Due to or associated with each other   [  ] Unrelated to each other   [  ] Other explanation (Please Specify): ______________   [  ] Clinically Undetermined                                                                               Please document in your progress notes daily for the duration of treatment until resolved and include in your discharge summary.

## 2023-08-12 ENCOUNTER — HOSPITAL ENCOUNTER (EMERGENCY)
Facility: HOSPITAL | Age: 63
Discharge: HOME | End: 2023-08-12
Attending: FAMILY MEDICINE
Payer: COMMERCIAL

## 2023-08-12 VITALS
HEART RATE: 74 BPM | DIASTOLIC BLOOD PRESSURE: 66 MMHG | RESPIRATION RATE: 18 BRPM | OXYGEN SATURATION: 98 % | SYSTOLIC BLOOD PRESSURE: 138 MMHG

## 2023-08-12 DIAGNOSIS — R55 NEAR SYNCOPE: Primary | ICD-10-CM

## 2023-08-12 DIAGNOSIS — E86.0 DEHYDRATION: ICD-10-CM

## 2023-08-12 DIAGNOSIS — I95.1 ORTHOSTATIC HYPOTENSION: ICD-10-CM

## 2023-08-12 LAB
ALBUMIN SERPL BCP-MCNC: 3.8 G/DL (ref 3.5–5.2)
ALP SERPL-CCNC: 900 U/L (ref 38–126)
ALT SERPL W/O P-5'-P-CCNC: 29 U/L (ref 10–44)
ANION GAP SERPL CALC-SCNC: 11 MMOL/L (ref 8–16)
AST SERPL-CCNC: 48 U/L (ref 15–46)
BASOPHILS # BLD AUTO: 0.04 K/UL (ref 0–0.2)
BASOPHILS NFR BLD: 0.4 % (ref 0–1.9)
BILIRUB SERPL-MCNC: 1.2 MG/DL (ref 0.1–1)
CALCIUM SERPL-MCNC: 9.3 MG/DL (ref 8.7–10.5)
CHLORIDE SERPL-SCNC: 93 MMOL/L (ref 95–110)
CO2 SERPL-SCNC: 38 MMOL/L (ref 23–29)
CREAT SERPL-MCNC: 1.08 MG/DL (ref 0.5–1.4)
DIFFERENTIAL METHOD: ABNORMAL
EOSINOPHIL # BLD AUTO: 0.2 K/UL (ref 0–0.5)
EOSINOPHIL NFR BLD: 2 % (ref 0–8)
ERYTHROCYTE [DISTWIDTH] IN BLOOD BY AUTOMATED COUNT: 16.1 % (ref 11.5–14.5)
EST. GFR  (NO RACE VARIABLE): 58.1 ML/MIN/1.73 M^2
GLUCOSE SERPL-MCNC: 281 MG/DL (ref 70–110)
HCT VFR BLD AUTO: 37.1 % (ref 37–48.5)
HGB BLD-MCNC: 11.2 G/DL (ref 12–16)
IMM GRANULOCYTES # BLD AUTO: 0.05 K/UL (ref 0–0.04)
IMM GRANULOCYTES NFR BLD AUTO: 0.5 % (ref 0–0.5)
LYMPHOCYTES # BLD AUTO: 0.9 K/UL (ref 1–4.8)
LYMPHOCYTES NFR BLD: 8.5 % (ref 18–48)
MCH RBC QN AUTO: 25.2 PG (ref 27–31)
MCHC RBC AUTO-ENTMCNC: 30.2 G/DL (ref 32–36)
MCV RBC AUTO: 83 FL (ref 82–98)
MONOCYTES # BLD AUTO: 0.9 K/UL (ref 0.3–1)
MONOCYTES NFR BLD: 8 % (ref 4–15)
NEUTROPHILS # BLD AUTO: 8.7 K/UL (ref 1.8–7.7)
NEUTROPHILS NFR BLD: 80.6 % (ref 38–73)
NRBC BLD-RTO: 0 /100 WBC
NT-PROBNP SERPL-MCNC: 2370 PG/ML (ref 5–900)
PLATELET # BLD AUTO: 350 K/UL (ref 150–450)
PMV BLD AUTO: 11.5 FL (ref 9.2–12.9)
POTASSIUM SERPL-SCNC: 3.8 MMOL/L (ref 3.5–5.1)
PROT SERPL-MCNC: 9.2 G/DL (ref 6–8.4)
RBC # BLD AUTO: 4.45 M/UL (ref 4–5.4)
SODIUM SERPL-SCNC: 142 MMOL/L (ref 136–145)
TROPONIN I SERPL-MCNC: 0.84 NG/ML (ref 0.01–0.03)
UUN UR-MCNC: 32 MG/DL (ref 7–17)
WBC # BLD AUTO: 10.75 K/UL (ref 3.9–12.7)

## 2023-08-12 PROCEDURE — 96361 HYDRATE IV INFUSION ADD-ON: CPT | Mod: ER

## 2023-08-12 PROCEDURE — 96374 THER/PROPH/DIAG INJ IV PUSH: CPT | Mod: ER

## 2023-08-12 PROCEDURE — 63600175 PHARM REV CODE 636 W HCPCS: Mod: ER | Performed by: FAMILY MEDICINE

## 2023-08-12 PROCEDURE — 93010 ELECTROCARDIOGRAM REPORT: CPT | Mod: ,,, | Performed by: INTERNAL MEDICINE

## 2023-08-12 PROCEDURE — 99284 EMERGENCY DEPT VISIT MOD MDM: CPT | Mod: 25,ER

## 2023-08-12 PROCEDURE — 83880 ASSAY OF NATRIURETIC PEPTIDE: CPT | Mod: ER | Performed by: FAMILY MEDICINE

## 2023-08-12 PROCEDURE — 25000003 PHARM REV CODE 250: Mod: ER | Performed by: FAMILY MEDICINE

## 2023-08-12 PROCEDURE — 84484 ASSAY OF TROPONIN QUANT: CPT | Mod: ER | Performed by: FAMILY MEDICINE

## 2023-08-12 PROCEDURE — 80053 COMPREHEN METABOLIC PANEL: CPT | Mod: ER | Performed by: FAMILY MEDICINE

## 2023-08-12 PROCEDURE — 85025 COMPLETE CBC W/AUTO DIFF WBC: CPT | Mod: ER | Performed by: FAMILY MEDICINE

## 2023-08-12 PROCEDURE — 93005 ELECTROCARDIOGRAM TRACING: CPT | Mod: ER

## 2023-08-12 PROCEDURE — 93010 EKG 12-LEAD: ICD-10-PCS | Mod: ,,, | Performed by: INTERNAL MEDICINE

## 2023-08-12 RX ORDER — ONDANSETRON 2 MG/ML
4 INJECTION INTRAMUSCULAR; INTRAVENOUS
Status: COMPLETED | OUTPATIENT
Start: 2023-08-12 | End: 2023-08-12

## 2023-08-12 RX ADMIN — SODIUM CHLORIDE 500 ML: 9 INJECTION, SOLUTION INTRAVENOUS at 03:08

## 2023-08-12 RX ADMIN — ONDANSETRON 4 MG: 2 INJECTION INTRAMUSCULAR; INTRAVENOUS at 03:08

## 2023-08-12 NOTE — ED PROVIDER NOTES
Encounter Date: 8/12/2023       History     Chief Complaint   Patient presents with    Loss of Consciousness      states pt had a brief LOC when going to the shower this morning. EMS states pt has pos orthostatic BP. the patient denies dizziness, light headedness. Pt told EMS she has bee having dark urine.      63-year-old female had near syncopal episode in bathroom for 30 seconds according to .  She did not fall or hit ground.  She immediately picking back to her normal since then she denies any other symptoms.  Patient denies chest pain, shortness of breath, cough.  She is been having chronic nausea and decreased appetite.  Recent NST mi with medical management.  Patient received 500 normal saline by EMS    The history is provided by the patient.     Review of patient's allergies indicates:   Allergen Reactions    Milk containing products (dairy)      Causes GI distress     Past Medical History:   Diagnosis Date    Anticoagulant long-term use     Asthma     Back pain     Bradycardia, unspecified 05/08/2019    The etiology of the bradycardic episode is unclear.  The have appear to be respiratory in origin (at least the 1st episode).  We will continue to monitor carefully.  We are awaiting evaluation by Cardiology.      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, with long-term current use of insulin 01/09/2020      Past Surgical History:   Procedure Laterality Date    ANGIOGRAM, CORONARY, WITH LEFT HEART CATHETERIZATION N/A 8/3/2023    Procedure: Angiogram, Coronary, with Left Heart Cath;  Surgeon: Aime George MD;  Location: [facility] CATH LAB;  Service: Cardiology;  Laterality: N/A;    ANTEGRADE NEPHROSTOGRAPHY Left 2019    Procedure: Nephrostogram - antegrade;  Surgeon: Robin Boyd MD;  [facility];  Service: Urology;  Laterality: Left;    BRONCHOSCOPY N/A 2019    Procedure: BRONCHOSCOPY;  Surgeon: Sean Ruano MD;  Location: [facility] OR [facility];  Service: General;  Laterality: N/A;    CARDIAC CATHETERIZATION      CATARACT EXTRACTION      cataract extraction left eye      cataracts      CAUDAL EPIDURAL STEROID INJECTION N/A 2019    Procedure: Injection-steroid-epidural-caudal;  Surgeon: Dave Bentley Jr., MD;  Location: [facility] PAIN MGT;  Service: Pain Management;  Laterality: N/A;     SECTION, LOW TRANSVERSE      COLONOSCOPY N/A 2019    Procedure: COLONOSCOPY;  Surgeon: Al Alaniz MD;  Location: [facility] ([facility]);  Service: Endoscopy;  Laterality: N/A;    CORONARY ANGIOPLASTY      CYSTOGRAM N/A 2019    Procedure: CYSTOGRAM INTRAOP;  Surgeon: Robin Boyd MD;  Location: [facility] OR [facility];  Service: Urology;  Laterality: N/A;  1 HOUR    CYSTOSCOPY W/ RETROGRADES Left 2019    Procedure: CYSTOSCOPY, WITH RETROGRADE PYELOGRAM;  Surgeon: Robin Boyd MD;  Location: [facility] OR [facility];  Service: Urology;  Laterality: Left;  fluro    ENDOSCOPIC ULTRASOUND OF UPPER GASTROINTESTINAL TRACT N/A 6/15/2023    Procedure: ULTRASOUND, UPPER GI TRACT, ENDOSCOPIC;  Surgeon: Lisa Kim MD;  Location: [facility] ENDO (2ND FLR);  Service: Endoscopy;  Laterality: N/A;    ERCP N/A 6/15/2023    Procedure: ERCP (ENDOSCOPIC RETROGRADE CHOLANGIOPANCREATOGRAPHY);  Surgeon: Lisa Kim MD;  Location: [facility] ENDO (2ND FLR);  Service: Endoscopy;  Laterality: N/A;     ESOPHAGOGASTRODUODENOSCOPY W/ PEG  5/2/2019    Procedure: EGD, WITH PEG TUBE INSERTION;  Surgeon: Sean Ruano MD;  Location: [facility] OR 2ND FLR;  Service: General;;    EXCISION TURBINATE, SUBMUCOUS      FLEXIBLE SIGMOIDOSCOPY N/A 5/13/2019    Procedure: SIGMOIDOSCOPY, FLEXIBLE;  Surgeon: ALBERTO Amin MD;  Location: [facility] ENDO (2ND FLR);  Service: Endoscopy;  Laterality: N/A;    FLEXIBLE SIGMOIDOSCOPY N/A 5/21/2019    Procedure: SIGMOIDOSCOPY, FLEXIBLE;  Surgeon: ALBERTO Amin MD;  Location: [facility] ENDO (2ND FLR);  Service: Endoscopy;  Laterality: N/A;    FUSION OF LUMBAR SPINE BY ANTERIOR APPROACH Left 4/12/2019    Procedure: FUSION, SPINE, LUMBAR, ANTERIOR APPROACH Left L5-S1 Anterior to Psoa Interbody Fusion, L5-S1 Posterior Instrumentation;  Surgeon: Mk George MD;  Location: [facility] OR 2ND FLR;  Service: Neurosurgery;  Laterality: Left;  Porcedure:  Left L5-S1 Anterior to Psoa Interbody Fusion, L5-S1 Posterior Instrumentation  Surgery Time: 4 Hrs  LOS: 2-3  Anesthesia: General   Blood: Type & Screen  R    HAND SURGERY Left     HAND SURGERY Right     torn ligament repair/ Dr. Yeboah    HYSTERECTOMY      INJECTION OF STEROID Right 12/10/2020    Procedure: INJECTION, STEROID Right SI Joint Block and Steroid Injection;  Surgeon: Mk George MD;  Location: [facility] OR;  Service: Neurosurgery;  Laterality: Right;  Procedure: Right SI Joint Block and Steroid Injection   SUrgery Time: 30 Min  LOS: 0  Anesthesia: MAC  Radiology: C-arm  Bed: [place] [id number] Poster  Position: Prone    INJECTION OF STEROID Right 9/28/2021    Procedure: INJECTION, STEROID Right SI joint block & steroid injection;  Surgeon: Mk George MD;  Location: [facility] OR;  Service: Neurosurgery;  Laterality: Right;  Procedure: Right SI joint block & steroid injection  Surgery Time: 30m  Anesthesia: Local MAC  Radiology: C-arm  Bed: Regular  Position: Prone    LAPAROSCOPIC CHOLECYSTECTOMY N/A 6/23/2023    Procedure: CHOLECYSTECTOMY,  LAPAROSCOPIC;  Surgeon: Richard Penny MD;  Location: [address];  Service: General;  Laterality: N/A;    left foot surgery      left wrist surgery      LYSIS OF ADHESIONS N/A 2/19/2020    Procedure: LYSIS, ADHESIONS;  Surgeon: Robin Boyd MD;  Location: [address];  Service: Urology;  Laterality: N/A;    NASAL SEPTUM SURGERY  5/7/15    OPEN REDUCTION AND INTERNAL FIXATION (ORIF) OF FRACTURE OF PROXIMAL HUMERUS Left 7/12/2023    Procedure: ORIF, FRACTURE, HUMERUS, PROXIMAL ;  Surgeon: Tomasz Leary MD;  Location: [address];  Service: Orthopedics;  Laterality: Left;    PERCUTANEOUS NEPHROSTOMY Left 4/21/2019    Procedure: Creation, Nephrostomy, Percutaneous;  Surgeon: Karina Surgeon;  Location: [facility];  Service: Anesthesiology;  Laterality: Left;    REPAIR OF URETER  4/12/2019    Procedure: REPAIR, URETER;  Surgeon: Mk George MD;  Location: [address];  Service: Neurosurgery;;    REPLACEMENT OF NEPHROSTOMY TUBE N/A 7/18/2019    Procedure: REPLACEMENT, NEPHROSTOMY TUBE;  Surgeon: [facility] Diagnostic Provider;  Location: [address];  Service: Anesthesiology;  Laterality: N/A;  188    REPLACEMENT OF NEPHROSTOMY TUBE N/A 7/24/2019    Procedure: REPLACEMENT, NEPHROSTOMY TUBE;  Surgeon: [facility] Diagnostic Provider;  Location: [address];  Service: Anesthesiology;  Laterality: N/A;  188    REPLACEMENT OF NEPHROSTOMY TUBE N/A 10/7/2019    Procedure: REPLACEMENT, NEPHROSTOMY TUBE;  Surgeon: [facility] Diagnostic Provider;  Location: [address];  Service: Anesthesiology;  Laterality: N/A;  189    REPLACEMENT OF NEPHROSTOMY TUBE N/A 11/25/2019    Procedure: REPLACEMENT, NEPHROSTOMY TUBE;  Surgeon: [facility] Diagnostic Provider;  Location: [address];  Service: Anesthesiology;  Laterality: N/A;  Room 188/Bessy    REPLACEMENT OF NEPHROSTOMY TUBE Right 2/19/2020    Procedure: REPLACEMENT, NEPHROSTOMY TUBE removal removal;  Surgeon: Robin Boyd MD;  Location: [address];  Service:  Urology;  Laterality: Right;    RIGHT HEART CATHETERIZATION Right 8/3/2023    Procedure: INSERTION, CATHETER, RIGHT HEART;  Surgeon: Aime George MD;  Location: [facility] CATH LAB;  Service: Cardiology;  Laterality: Right;    rt elbow surgery      S/P LAD COATED STENT  05/14/2010    6 total     S/P OM1 STENT  08/2003    SINUS SURGERY      F.E.S.S.    TRACHEOSTOMY N/A 5/2/2019    Procedure: CREATION, TRACHEOSTOMY;  Surgeon: Sean Ruano MD;  Location: [address];  Service: General;  Laterality: N/A;    TUBAL LIGATION      URETERAL REIMPLANTION Left 2/19/2020    Procedure: REIMPLANTATION, URETER WITH PSOAS HITCH;  Surgeon: Robin Boyd MD;  Location: [facility] OR [address] [place] [address];  Service: Urology;  Laterality: Left;    VENTRICULOGRAM, LEFT  8/3/2023    Procedure: Ventriculogram, Left;  Surgeon: Aime George MD;  Location: [facility] CATH LAB;  Service: Cardiology;;     Family History   Problem Relation Age of Onset    Diabetes Mother     Heart disease Mother     Diabetes Father     Leukemia Father         leukemia    Heart attack Father     Diabetes Sister     Diabetes Brother     Diabetes Sister     No Known Problems Sister     No Known Problems Brother     No Known Problems Brother     No Known Problems Maternal Grandmother     No Known Problems Maternal Grandfather     No Known Problems Paternal Grandmother     No Known Problems Paternal Grandfather     No Known Problems Son     No Known Problems Son     No Known Problems Maternal Aunt     No Known Problems Maternal Uncle     No Known Problems Paternal Aunt     No Known Problems Paternal Uncle     Colon cancer Neg Hx     Inflammatory bowel disease Neg Hx     Melanoma Neg Hx     Psoriasis Neg Hx     Lupus Neg Hx     Eczema Neg Hx     Acne Neg Hx     Amblyopia Neg Hx     Blindness Neg Hx     Cancer Neg Hx     Cataracts Neg Hx     Glaucoma Neg Hx     Hypertension Neg Hx     Macular degeneration Neg Hx     Retinal detachment Neg Hx     Strabismus Neg Hx     Stroke  Neg Hx     Thyroid disease Neg Hx     Heart failure Neg Hx     Hyperlipidemia Neg Hx      Social History     Tobacco Use    Smoking status: Never    Smokeless tobacco: Never   Substance Use Topics    Alcohol use: No     Alcohol/week: 0.0 standard drinks of alcohol    Drug use: No     Review of Systems   Constitutional:  Negative for fever.   HENT:  Negative for sore throat.    Respiratory:  Negative for shortness of breath.    Cardiovascular:  Negative for chest pain.   Gastrointestinal:  Positive for nausea.   Genitourinary:  Negative for dysuria.   Musculoskeletal:  Negative for back pain.   Skin:  Negative for rash.   Neurological:  Positive for syncope. Negative for weakness.   Hematological:  Does not bruise/bleed easily.   All other systems reviewed and are negative.      Physical Exam     Initial Vitals [08/12/23 1502]   BP Pulse Resp Temp SpO2   (!) 148/87 80 17 -- 100 %      MAP       --         Physical Exam    Nursing note and vitals reviewed.  Constitutional: Vital signs are normal. She appears well-developed and well-nourished. She is active. No distress.   HENT:   Head: Normocephalic.   Nose: Nose normal.   Mouth/Throat: Oropharynx is clear and moist and mucous membranes are normal.   Eyes: Conjunctivae, EOM and lids are normal.   Neck: Neck supple.   Normal range of motion.  Cardiovascular:  Normal rate, regular rhythm, S1 normal, S2 normal and normal heart sounds.           Pulmonary/Chest: Breath sounds normal. No respiratory distress. She has no wheezes. She has no rhonchi. She has no rales.   Abdominal: Abdomen is soft. Bowel sounds are normal. She exhibits no distension. There is no abdominal tenderness. There is no rebound and no guarding.   Musculoskeletal:      Right upper arm: Normal.      Left upper arm: Normal.      Cervical back: Normal range of motion and neck supple.      Right lower leg: Normal.      Left lower leg: Normal.     Neurological: She is alert and oriented to person, place,  and time. She has normal strength. GCS score is 15. GCS eye subscore is 4. GCS verbal subscore is 5. GCS motor subscore is 6.   Skin: Skin is warm. Capillary refill takes less than 2 seconds.   Psychiatric: She has a normal mood and affect. Her speech is normal and behavior is normal. Thought content normal. Cognition and memory are normal.         ED Course   Procedures  Labs Reviewed   CBC W/ AUTO DIFFERENTIAL - Abnormal; Notable for the following components:       Result Value    Hemoglobin 11.2 (*)     MCH 25.2 (*)     MCHC 30.2 (*)     RDW 16.1 (*)     Gran # (ANC) 8.7 (*)     Immature Grans (Abs) 0.05 (*)     Lymph # 0.9 (*)     Gran % 80.6 (*)     Lymph % 8.5 (*)     All other components within normal limits   COMPREHENSIVE METABOLIC PANEL - Abnormal; Notable for the following components:    Chloride 93 (*)     CO2 38 (*)     Glucose 281 (*)     BUN 32 (*)     Total Protein 9.2 (*)     Total Bilirubin 1.2 (*)     Alkaline Phosphatase 900 (*)     AST 48 (*)     eGFR 58.1 (*)     All other components within normal limits   NT-PRO NATRIURETIC PEPTIDE - Abnormal; Notable for the following components:    NT-proBNP 2370 (*)     All other components within normal limits   TROPONIN I - Abnormal; Notable for the following components:    Troponin I 0.842 (*)     All other components within normal limits     EKG Readings: (Independently Interpreted)   Initial Reading: No STEMI. Rhythm: Normal Sinus Rhythm. Heart Rate: 79. Ectopy: No Ectopy. Conduction: Normal. ST Segments: Normal ST Segments. T Waves: Normal. Clinical Impression: Normal Sinus Rhythm Other Impression: Changes.       Imaging Results    None          Medications   sodium chloride 0.9% bolus 500 mL 500 mL (has no administration in time range)   ondansetron injection 4 mg (4 mg Intravenous Given 8/12/23 8528)     Medical Decision Making:   History:   Old Medical Records: I decided to obtain old medical records.  Old Records Summarized: records from previous  admission(s).       <> Summary of Records: The Ramus lesion was 70% stenosed.  ·  The Ost Cx to Prox Cx lesion was 99% stenosed.  ·  The ejection fraction was calculated to be 45%.  ·  The pre-procedure left ventricular end diastolic pressure was 15.    Initial Assessment:   Near syncopal episode for 30 seconds.  Patient receive 500 normal saline by EMS  Differential Diagnosis:   Syncope, near-syncope, hypotension, hypoglycemia, cardiac arrhythmia, generalized weakness,  Clinical Tests:   Lab Tests: Ordered and Reviewed       <> Summary of Lab: Mild dehydration BUN of 32 normal creatinine.  Slightly elevated troponin 0.8.    ED Management:  Patient is a traumatic.  Currently be hydrated with 500 cc normal saline.    Orthostatic hypotension noted.  Hydrated with another 500 cc of normal saline.  Symptoms have improved.  Chronic nausea.  Will prescribe Zofran.  Adequate hydration and nutrition.  Continue all her home medication.  Follow up PCP/ED with any worsening symptoms.                          Clinical Impression:   Final diagnoses:  [R55] Near syncope (Primary)  [E86.0] Dehydration  [I95.1] Orthostatic hypotension               Felix Perez MD  08/12/23 5267

## 2023-08-14 ENCOUNTER — HOSPITAL ENCOUNTER (INPATIENT)
Facility: HOSPITAL | Age: 63
LOS: 18 days | Discharge: HOME HEALTH | DRG: 246 | End: 2023-09-01
Attending: STUDENT IN AN ORGANIZED HEALTH CARE EDUCATION/TRAINING PROGRAM | Admitting: INTERNAL MEDICINE
Payer: COMMERCIAL

## 2023-08-14 DIAGNOSIS — E11.22 TYPE 2 DIABETES MELLITUS WITH STAGE 2 CHRONIC KIDNEY DISEASE AND HYPERTENSION: Chronic | ICD-10-CM

## 2023-08-14 DIAGNOSIS — I25.10 CORONARY ARTERY DISEASE INVOLVING NATIVE CORONARY ARTERY OF NATIVE HEART WITHOUT ANGINA PECTORIS: Chronic | ICD-10-CM

## 2023-08-14 DIAGNOSIS — I21.29 ACUTE ST ELEVATION MYOCARDIAL INFARCTION (STEMI) OF POSTERIOR WALL: ICD-10-CM

## 2023-08-14 DIAGNOSIS — R79.89 TROPONIN LEVEL ELEVATED: ICD-10-CM

## 2023-08-14 DIAGNOSIS — I21.3 ST ELEVATION MYOCARDIAL INFARCTION (STEMI), UNSPECIFIED ARTERY: ICD-10-CM

## 2023-08-14 DIAGNOSIS — K76.9 LIVER LESION: ICD-10-CM

## 2023-08-14 DIAGNOSIS — I12.9 TYPE 2 DIABETES MELLITUS WITH STAGE 2 CHRONIC KIDNEY DISEASE AND HYPERTENSION: Chronic | ICD-10-CM

## 2023-08-14 DIAGNOSIS — E11.69 HYPERLIPIDEMIA ASSOCIATED WITH TYPE 2 DIABETES MELLITUS: Chronic | ICD-10-CM

## 2023-08-14 DIAGNOSIS — E80.6 HYPERBILIRUBINEMIA: ICD-10-CM

## 2023-08-14 DIAGNOSIS — E78.5 HYPERLIPIDEMIA ASSOCIATED WITH TYPE 2 DIABETES MELLITUS: Chronic | ICD-10-CM

## 2023-08-14 DIAGNOSIS — E11.51 TYPE 2 DIABETES MELLITUS WITH DIABETIC PERIPHERAL ANGIOPATHY WITHOUT GANGRENE, WITH LONG-TERM CURRENT USE OF INSULIN: Chronic | ICD-10-CM

## 2023-08-14 DIAGNOSIS — I21.21 ST ELEVATION MYOCARDIAL INFARCTION INVOLVING LEFT CIRCUMFLEX CORONARY ARTERY: ICD-10-CM

## 2023-08-14 DIAGNOSIS — R53.81 PHYSICAL DEBILITY: Primary | ICD-10-CM

## 2023-08-14 DIAGNOSIS — I95.9 HYPOTENSION: ICD-10-CM

## 2023-08-14 DIAGNOSIS — Z79.4 TYPE 2 DIABETES MELLITUS WITH DIABETIC PERIPHERAL ANGIOPATHY WITHOUT GANGRENE, WITH LONG-TERM CURRENT USE OF INSULIN: Chronic | ICD-10-CM

## 2023-08-14 DIAGNOSIS — D64.9 ANEMIA, UNSPECIFIED TYPE: ICD-10-CM

## 2023-08-14 DIAGNOSIS — R55 SYNCOPE: ICD-10-CM

## 2023-08-14 DIAGNOSIS — R57.9 SHOCK: ICD-10-CM

## 2023-08-14 DIAGNOSIS — R07.9 CHEST PAIN: ICD-10-CM

## 2023-08-14 DIAGNOSIS — K83.1 BILIARY OBSTRUCTION: ICD-10-CM

## 2023-08-14 DIAGNOSIS — R94.31 QT PROLONGATION: ICD-10-CM

## 2023-08-14 DIAGNOSIS — I26.99 PULMONARY EMBOLISM: ICD-10-CM

## 2023-08-14 DIAGNOSIS — I49.9 CARDIAC RHYTHM DISORDER OR DISTURBANCE OR CHANGE: ICD-10-CM

## 2023-08-14 DIAGNOSIS — N18.2 TYPE 2 DIABETES MELLITUS WITH STAGE 2 CHRONIC KIDNEY DISEASE AND HYPERTENSION: Chronic | ICD-10-CM

## 2023-08-14 LAB
ABO + RH BLD: NORMAL
ALBUMIN SERPL BCP-MCNC: 2.8 G/DL (ref 3.5–5.2)
ALP SERPL-CCNC: 785 U/L (ref 55–135)
ALT SERPL W/O P-5'-P-CCNC: 19 U/L (ref 10–44)
ANION GAP SERPL CALC-SCNC: 22 MMOL/L (ref 8–16)
AST SERPL-CCNC: 36 U/L (ref 10–40)
BASOPHILS # BLD AUTO: 0.07 K/UL (ref 0–0.2)
BASOPHILS NFR BLD: 0.5 % (ref 0–1.9)
BILIRUB SERPL-MCNC: 1.1 MG/DL (ref 0.1–1)
BLD GP AB SCN CELLS X3 SERPL QL: NORMAL
BUN SERPL-MCNC: 24 MG/DL (ref 8–23)
CALCIUM SERPL-MCNC: 9.7 MG/DL (ref 8.7–10.5)
CHLORIDE SERPL-SCNC: 89 MMOL/L (ref 95–110)
CO2 SERPL-SCNC: 29 MMOL/L (ref 23–29)
CREAT SERPL-MCNC: 1.2 MG/DL (ref 0.5–1.4)
DIFFERENTIAL METHOD: ABNORMAL
EOSINOPHIL # BLD AUTO: 0.1 K/UL (ref 0–0.5)
EOSINOPHIL NFR BLD: 0.8 % (ref 0–8)
ERYTHROCYTE [DISTWIDTH] IN BLOOD BY AUTOMATED COUNT: 16.1 % (ref 11.5–14.5)
EST. GFR  (NO RACE VARIABLE): 51.2 ML/MIN/1.73 M^2
GLUCOSE SERPL-MCNC: 245 MG/DL (ref 70–110)
HCT VFR BLD AUTO: 35.9 % (ref 37–48.5)
HGB BLD-MCNC: 10.9 G/DL (ref 12–16)
IMM GRANULOCYTES # BLD AUTO: 0.06 K/UL (ref 0–0.04)
IMM GRANULOCYTES NFR BLD AUTO: 0.4 % (ref 0–0.5)
LYMPHOCYTES # BLD AUTO: 1.7 K/UL (ref 1–4.8)
LYMPHOCYTES NFR BLD: 12 % (ref 18–48)
MAGNESIUM SERPL-MCNC: 2.5 MG/DL (ref 1.6–2.6)
MCH RBC QN AUTO: 25.3 PG (ref 27–31)
MCHC RBC AUTO-ENTMCNC: 30.4 G/DL (ref 32–36)
MCV RBC AUTO: 84 FL (ref 82–98)
MONOCYTES # BLD AUTO: 0.9 K/UL (ref 0.3–1)
MONOCYTES NFR BLD: 6.4 % (ref 4–15)
NEUTROPHILS # BLD AUTO: 11 K/UL (ref 1.8–7.7)
NEUTROPHILS NFR BLD: 79.9 % (ref 38–73)
NRBC BLD-RTO: 0 /100 WBC
PLATELET # BLD AUTO: 409 K/UL (ref 150–450)
PMV BLD AUTO: 11.9 FL (ref 9.2–12.9)
POTASSIUM SERPL-SCNC: 3.4 MMOL/L (ref 3.5–5.1)
PROT SERPL-MCNC: 9.4 G/DL (ref 6–8.4)
RBC # BLD AUTO: 4.3 M/UL (ref 4–5.4)
SODIUM SERPL-SCNC: 140 MMOL/L (ref 136–145)
SPECIMEN OUTDATE: NORMAL
TROPONIN I SERPL DL<=0.01 NG/ML-MCNC: 1.02 NG/ML (ref 0–0.03)
WBC # BLD AUTO: 13.82 K/UL (ref 3.9–12.7)

## 2023-08-14 PROCEDURE — 63600175 PHARM REV CODE 636 W HCPCS: Performed by: INTERNAL MEDICINE

## 2023-08-14 PROCEDURE — 93458 L HRT ARTERY/VENTRICLE ANGIO: CPT | Mod: 26,59,51,GC | Performed by: INTERNAL MEDICINE

## 2023-08-14 PROCEDURE — 27201423 OPTIME MED/SURG SUP & DEVICES STERILE SUPPLY: Performed by: INTERNAL MEDICINE

## 2023-08-14 PROCEDURE — 93458 PR CATH PLACE/CORON ANGIO, IMG SUPER/INTERP,W LEFT HEART VENTRICULOGRAPHY: ICD-10-PCS | Mod: 26,59,51,GC | Performed by: INTERNAL MEDICINE

## 2023-08-14 PROCEDURE — 80053 COMPREHEN METABOLIC PANEL: CPT | Performed by: STUDENT IN AN ORGANIZED HEALTH CARE EDUCATION/TRAINING PROGRAM

## 2023-08-14 PROCEDURE — 99152 MOD SED SAME PHYS/QHP 5/>YRS: CPT | Mod: GC,,, | Performed by: INTERNAL MEDICINE

## 2023-08-14 PROCEDURE — 94761 N-INVAS EAR/PLS OXIMETRY MLT: CPT

## 2023-08-14 PROCEDURE — 25000003 PHARM REV CODE 250: Performed by: STUDENT IN AN ORGANIZED HEALTH CARE EDUCATION/TRAINING PROGRAM

## 2023-08-14 PROCEDURE — 63600175 PHARM REV CODE 636 W HCPCS

## 2023-08-14 PROCEDURE — 25500020 PHARM REV CODE 255: Performed by: INTERNAL MEDICINE

## 2023-08-14 PROCEDURE — 83735 ASSAY OF MAGNESIUM: CPT | Performed by: STUDENT IN AN ORGANIZED HEALTH CARE EDUCATION/TRAINING PROGRAM

## 2023-08-14 PROCEDURE — C1757 CATH, THROMBECTOMY/EMBOLECT: HCPCS | Performed by: INTERNAL MEDICINE

## 2023-08-14 PROCEDURE — 84484 ASSAY OF TROPONIN QUANT: CPT | Performed by: STUDENT IN AN ORGANIZED HEALTH CARE EDUCATION/TRAINING PROGRAM

## 2023-08-14 PROCEDURE — 93458 L HRT ARTERY/VENTRICLE ANGIO: CPT | Mod: 59 | Performed by: INTERNAL MEDICINE

## 2023-08-14 PROCEDURE — C1753 CATH, INTRAVAS ULTRASOUND: HCPCS | Performed by: INTERNAL MEDICINE

## 2023-08-14 PROCEDURE — 92978 ENDOLUMINL IVUS OCT C 1ST: CPT | Mod: 26,RI,GC, | Performed by: INTERNAL MEDICINE

## 2023-08-14 PROCEDURE — C1725 CATH, TRANSLUMIN NON-LASER: HCPCS | Performed by: INTERNAL MEDICINE

## 2023-08-14 PROCEDURE — 99153 MOD SED SAME PHYS/QHP EA: CPT | Performed by: INTERNAL MEDICINE

## 2023-08-14 PROCEDURE — 99152 MOD SED SAME PHYS/QHP 5/>YRS: CPT | Performed by: INTERNAL MEDICINE

## 2023-08-14 PROCEDURE — 92978 ENDOLUMINL IVUS OCT C 1ST: CPT | Performed by: INTERNAL MEDICINE

## 2023-08-14 PROCEDURE — 92978 PR IVUS, CORONARY, 1ST VESSEL: ICD-10-PCS | Mod: 26,RI,GC, | Performed by: INTERNAL MEDICINE

## 2023-08-14 PROCEDURE — C1769 GUIDE WIRE: HCPCS | Performed by: INTERNAL MEDICINE

## 2023-08-14 PROCEDURE — C1894 INTRO/SHEATH, NON-LASER: HCPCS | Performed by: INTERNAL MEDICINE

## 2023-08-14 PROCEDURE — C9606 PERC D-E COR REVASC W AMI S: HCPCS | Mod: RI | Performed by: INTERNAL MEDICINE

## 2023-08-14 PROCEDURE — 86900 BLOOD TYPING SEROLOGIC ABO: CPT | Performed by: STUDENT IN AN ORGANIZED HEALTH CARE EDUCATION/TRAINING PROGRAM

## 2023-08-14 PROCEDURE — 25000003 PHARM REV CODE 250: Performed by: INTERNAL MEDICINE

## 2023-08-14 PROCEDURE — 92941 PRQ TRLML REVSC TOT OCCL AMI: CPT | Mod: RI,GC,, | Performed by: INTERNAL MEDICINE

## 2023-08-14 PROCEDURE — 93005 ELECTROCARDIOGRAM TRACING: CPT

## 2023-08-14 PROCEDURE — 96374 THER/PROPH/DIAG INJ IV PUSH: CPT

## 2023-08-14 PROCEDURE — C1887 CATHETER, GUIDING: HCPCS | Performed by: INTERNAL MEDICINE

## 2023-08-14 PROCEDURE — 20000000 HC ICU ROOM

## 2023-08-14 PROCEDURE — 99152 PR MOD CONSCIOUS SEDATION, SAME PHYS, 5+ YRS, FIRST 15 MIN: ICD-10-PCS | Mod: GC,,, | Performed by: INTERNAL MEDICINE

## 2023-08-14 PROCEDURE — C1760 CLOSURE DEV, VASC: HCPCS | Performed by: INTERNAL MEDICINE

## 2023-08-14 PROCEDURE — 93010 ELECTROCARDIOGRAM REPORT: CPT | Mod: ,,, | Performed by: INTERNAL MEDICINE

## 2023-08-14 PROCEDURE — 93010 ELECTROCARDIOGRAM REPORT: CPT | Mod: 76,,, | Performed by: INTERNAL MEDICINE

## 2023-08-14 PROCEDURE — 85025 COMPLETE CBC W/AUTO DIFF WBC: CPT | Performed by: STUDENT IN AN ORGANIZED HEALTH CARE EDUCATION/TRAINING PROGRAM

## 2023-08-14 PROCEDURE — C1874 STENT, COATED/COV W/DEL SYS: HCPCS | Performed by: INTERNAL MEDICINE

## 2023-08-14 PROCEDURE — 99284 EMERGENCY DEPT VISIT MOD MDM: CPT | Mod: 25

## 2023-08-14 PROCEDURE — 99900035 HC TECH TIME PER 15 MIN (STAT)

## 2023-08-14 PROCEDURE — 92941 PR AMI ANY METHOD: ICD-10-PCS | Mod: RI,GC,, | Performed by: INTERNAL MEDICINE

## 2023-08-14 PROCEDURE — 93010 EKG 12-LEAD: ICD-10-PCS | Mod: 76,,, | Performed by: INTERNAL MEDICINE

## 2023-08-14 PROCEDURE — 85347 COAGULATION TIME ACTIVATED: CPT | Performed by: INTERNAL MEDICINE

## 2023-08-14 DEVICE — EVEROLIMUS-ELUTING PLATINUM CHROMIUM CORONARY STENT SYSTEM
Type: IMPLANTABLE DEVICE | Site: CORONARY | Status: FUNCTIONAL
Brand: SYNERGY™ XD

## 2023-08-14 DEVICE — ANGIO-SEAL VIP VASCULAR CLOSURE DEVICE
Type: IMPLANTABLE DEVICE | Site: GROIN | Status: FUNCTIONAL
Brand: ANGIO-SEAL

## 2023-08-14 RX ORDER — HEPARIN SOD,PORCINE/0.9 % NACL 1000/500ML
INTRAVENOUS SOLUTION INTRAVENOUS
Status: DISCONTINUED | OUTPATIENT
Start: 2023-08-14 | End: 2023-08-14 | Stop reason: HOSPADM

## 2023-08-14 RX ORDER — LIDOCAINE HYDROCHLORIDE 10 MG/ML
INJECTION INFILTRATION; PERINEURAL
Status: DISCONTINUED | OUTPATIENT
Start: 2023-08-14 | End: 2023-08-14 | Stop reason: HOSPADM

## 2023-08-14 RX ORDER — MIDAZOLAM HYDROCHLORIDE 1 MG/ML
INJECTION INTRAMUSCULAR; INTRAVENOUS
Status: DISCONTINUED | OUTPATIENT
Start: 2023-08-14 | End: 2023-08-14 | Stop reason: HOSPADM

## 2023-08-14 RX ORDER — ATORVASTATIN CALCIUM 40 MG/1
40 TABLET, FILM COATED ORAL NIGHTLY
Status: DISCONTINUED | OUTPATIENT
Start: 2023-08-14 | End: 2023-09-01 | Stop reason: HOSPADM

## 2023-08-14 RX ORDER — ESCITALOPRAM OXALATE 10 MG/1
10 TABLET ORAL DAILY
Status: DISCONTINUED | OUTPATIENT
Start: 2023-08-15 | End: 2023-09-01 | Stop reason: HOSPADM

## 2023-08-14 RX ORDER — ACETAMINOPHEN 325 MG/1
650 TABLET ORAL EVERY 4 HOURS PRN
Status: DISCONTINUED | OUTPATIENT
Start: 2023-08-14 | End: 2023-08-14

## 2023-08-14 RX ORDER — ONDANSETRON 2 MG/ML
INJECTION INTRAMUSCULAR; INTRAVENOUS
Status: DISCONTINUED | OUTPATIENT
Start: 2023-08-14 | End: 2023-08-14 | Stop reason: HOSPADM

## 2023-08-14 RX ORDER — GABAPENTIN 300 MG/1
600 CAPSULE ORAL NIGHTLY
Status: DISCONTINUED | OUTPATIENT
Start: 2023-08-15 | End: 2023-08-24

## 2023-08-14 RX ORDER — LANOLIN ALCOHOL/MO/W.PET/CERES
400 CREAM (GRAM) TOPICAL 2 TIMES DAILY
Status: DISCONTINUED | OUTPATIENT
Start: 2023-08-14 | End: 2023-08-23

## 2023-08-14 RX ORDER — ONDANSETRON 2 MG/ML
INJECTION INTRAMUSCULAR; INTRAVENOUS
Status: COMPLETED
Start: 2023-08-14 | End: 2023-08-14

## 2023-08-14 RX ORDER — GABAPENTIN 300 MG/1
300 CAPSULE ORAL DAILY
Status: DISCONTINUED | OUTPATIENT
Start: 2023-08-15 | End: 2023-08-23

## 2023-08-14 RX ORDER — GLUCAGON 1 MG
1 KIT INJECTION
Status: DISCONTINUED | OUTPATIENT
Start: 2023-08-14 | End: 2023-08-15

## 2023-08-14 RX ORDER — INSULIN ASPART 100 [IU]/ML
0-5 INJECTION, SOLUTION INTRAVENOUS; SUBCUTANEOUS
Status: DISCONTINUED | OUTPATIENT
Start: 2023-08-14 | End: 2023-08-15

## 2023-08-14 RX ORDER — CLOPIDOGREL BISULFATE 75 MG/1
75 TABLET ORAL DAILY
Status: DISCONTINUED | OUTPATIENT
Start: 2023-08-16 | End: 2023-09-01 | Stop reason: HOSPADM

## 2023-08-14 RX ORDER — ONDANSETRON 2 MG/ML
4 INJECTION INTRAMUSCULAR; INTRAVENOUS ONCE
Status: COMPLETED | OUTPATIENT
Start: 2023-08-14 | End: 2023-08-14

## 2023-08-14 RX ORDER — CLOPIDOGREL 300 MG/1
600 TABLET, FILM COATED ORAL ONCE
Status: COMPLETED | OUTPATIENT
Start: 2023-08-15 | End: 2023-08-15

## 2023-08-14 RX ORDER — SODIUM CHLORIDE 9 MG/ML
INJECTION, SOLUTION INTRAVENOUS CONTINUOUS
Status: ACTIVE | OUTPATIENT
Start: 2023-08-14 | End: 2023-08-15

## 2023-08-14 RX ORDER — ASPIRIN 325 MG
325 TABLET ORAL
Status: COMPLETED | OUTPATIENT
Start: 2023-08-14 | End: 2023-08-14

## 2023-08-14 RX ORDER — ACETAMINOPHEN 325 MG/1
650 TABLET ORAL EVERY 6 HOURS PRN
Status: DISCONTINUED | OUTPATIENT
Start: 2023-08-14 | End: 2023-08-19

## 2023-08-14 RX ORDER — DIPHENHYDRAMINE HCL 50 MG
50 CAPSULE ORAL ONCE
Status: CANCELLED | OUTPATIENT
Start: 2023-08-14 | End: 2023-08-14

## 2023-08-14 RX ORDER — LOSARTAN POTASSIUM 25 MG/1
25 TABLET ORAL DAILY
Status: DISCONTINUED | OUTPATIENT
Start: 2023-08-15 | End: 2023-08-23

## 2023-08-14 RX ORDER — DIPHENHYDRAMINE HCL 50 MG
50 CAPSULE ORAL
Status: DISCONTINUED | OUTPATIENT
Start: 2023-08-14 | End: 2023-08-14

## 2023-08-14 RX ORDER — HEPARIN SODIUM 1000 [USP'U]/ML
INJECTION, SOLUTION INTRAVENOUS; SUBCUTANEOUS
Status: DISCONTINUED | OUTPATIENT
Start: 2023-08-14 | End: 2023-08-14 | Stop reason: HOSPADM

## 2023-08-14 RX ORDER — IBUPROFEN 200 MG
16 TABLET ORAL
Status: DISCONTINUED | OUTPATIENT
Start: 2023-08-14 | End: 2023-08-15

## 2023-08-14 RX ORDER — ASPIRIN 81 MG/1
81 TABLET ORAL DAILY
Status: DISCONTINUED | OUTPATIENT
Start: 2023-08-15 | End: 2023-09-01 | Stop reason: HOSPADM

## 2023-08-14 RX ORDER — FENTANYL CITRATE 50 UG/ML
INJECTION, SOLUTION INTRAMUSCULAR; INTRAVENOUS
Status: DISCONTINUED | OUTPATIENT
Start: 2023-08-14 | End: 2023-08-14 | Stop reason: HOSPADM

## 2023-08-14 RX ORDER — SODIUM CHLORIDE 0.9 % (FLUSH) 0.9 %
10 SYRINGE (ML) INJECTION
Status: DISCONTINUED | OUTPATIENT
Start: 2023-08-14 | End: 2023-09-01 | Stop reason: HOSPADM

## 2023-08-14 RX ORDER — IBUPROFEN 200 MG
24 TABLET ORAL
Status: DISCONTINUED | OUTPATIENT
Start: 2023-08-14 | End: 2023-08-15

## 2023-08-14 RX ORDER — CEFAZOLIN SODIUM 1 G/50ML
SOLUTION INTRAVENOUS
Status: COMPLETED | OUTPATIENT
Start: 2023-08-14 | End: 2023-08-14

## 2023-08-14 RX ORDER — POTASSIUM CHLORIDE 750 MG/1
30 CAPSULE, EXTENDED RELEASE ORAL
Status: DISPENSED | OUTPATIENT
Start: 2023-08-14 | End: 2023-08-15

## 2023-08-14 RX ORDER — CEFPODOXIME PROXETIL 200 MG/1
400 TABLET, FILM COATED ORAL EVERY 12 HOURS
Status: DISCONTINUED | OUTPATIENT
Start: 2023-08-14 | End: 2023-08-17

## 2023-08-14 RX ORDER — SODIUM CHLORIDE 9 MG/ML
INJECTION, SOLUTION INTRAVENOUS CONTINUOUS
Status: CANCELLED | OUTPATIENT
Start: 2023-08-14 | End: 2023-08-14

## 2023-08-14 RX ADMIN — ATORVASTATIN CALCIUM 40 MG: 40 TABLET, FILM COATED ORAL at 10:08

## 2023-08-14 RX ADMIN — Medication 400 MG: at 10:08

## 2023-08-14 RX ADMIN — CEFPODOXIME PROXETIL 400 MG: 200 TABLET, FILM COATED ORAL at 10:08

## 2023-08-14 RX ADMIN — TICAGRELOR 180 MG: 90 TABLET ORAL at 07:08

## 2023-08-14 RX ADMIN — APIXABAN 5 MG: 5 TABLET, FILM COATED ORAL at 10:08

## 2023-08-14 RX ADMIN — POTASSIUM CHLORIDE 30 MEQ: 10 CAPSULE, COATED, EXTENDED RELEASE ORAL at 10:08

## 2023-08-14 RX ADMIN — DIPHENHYDRAMINE HYDROCHLORIDE 50 MG: 50 CAPSULE ORAL at 07:08

## 2023-08-14 RX ADMIN — ONDANSETRON 4 MG: 2 INJECTION INTRAMUSCULAR; INTRAVENOUS at 08:08

## 2023-08-14 RX ADMIN — ASPIRIN 325 MG ORAL TABLET 325 MG: 325 PILL ORAL at 07:08

## 2023-08-14 RX ADMIN — SODIUM CHLORIDE: 9 INJECTION, SOLUTION INTRAVENOUS at 10:08

## 2023-08-14 NOTE — Clinical Note
The catheter was inserted into the and was repositioned into the ostium   left main. An angiography was performed of the left coronary arteries. Multiple views were taken.

## 2023-08-14 NOTE — Clinical Note
85 ml of contrast were injected throughout the case. 115 mL of contrast was the total wasted during the case. 200 mL was the total amount used during the case.

## 2023-08-15 LAB
ANION GAP SERPL CALC-SCNC: 12 MMOL/L (ref 8–16)
APTT PPP: 29.8 SEC (ref 21–32)
ASCENDING AORTA: 2.67 CM
AV INDEX (PROSTH): 0.98
AV MEAN GRADIENT: 1 MMHG
AV PEAK GRADIENT: 2 MMHG
AV VALVE AREA BY VELOCITY RATIO: 2.64 CM²
AV VALVE AREA: 3.02 CM²
AV VELOCITY RATIO: 0.86
BASOPHILS # BLD AUTO: 0.03 K/UL (ref 0–0.2)
BASOPHILS # BLD AUTO: 0.04 K/UL (ref 0–0.2)
BASOPHILS NFR BLD: 0.3 % (ref 0–1.9)
BASOPHILS NFR BLD: 0.4 % (ref 0–1.9)
BSA FOR ECHO PROCEDURE: 1.62 M2
BUN SERPL-MCNC: 22 MG/DL (ref 8–23)
CALCIUM SERPL-MCNC: 8.1 MG/DL (ref 8.7–10.5)
CHLORIDE SERPL-SCNC: 98 MMOL/L (ref 95–110)
CO2 SERPL-SCNC: 32 MMOL/L (ref 23–29)
CREAT SERPL-MCNC: 1 MG/DL (ref 0.5–1.4)
CV ECHO LV RWT: 0.37 CM
DIFFERENTIAL METHOD: ABNORMAL
DIFFERENTIAL METHOD: ABNORMAL
DOP CALC AO PEAK VEL: 0.78 M/S
DOP CALC AO VTI: 15.27 CM
DOP CALC LVOT AREA: 3.1 CM2
DOP CALC LVOT DIAMETER: 1.98 CM
DOP CALC LVOT PEAK VEL: 0.67 M/S
DOP CALC LVOT STROKE VOLUME: 46.1 CM3
DOP CALCLVOT PEAK VEL VTI: 14.98 CM
E WAVE DECELERATION TIME: 162.08 MSEC
E/A RATIO: 0.74
E/E' RATIO: 10.91 M/S
ECHO LV POSTERIOR WALL: 0.89 CM (ref 0.6–1.1)
EJECTION FRACTION: 40 %
EOSINOPHIL # BLD AUTO: 0.2 K/UL (ref 0–0.5)
EOSINOPHIL # BLD AUTO: 0.3 K/UL (ref 0–0.5)
EOSINOPHIL NFR BLD: 1.9 % (ref 0–8)
EOSINOPHIL NFR BLD: 2.7 % (ref 0–8)
ERYTHROCYTE [DISTWIDTH] IN BLOOD BY AUTOMATED COUNT: 16.2 % (ref 11.5–14.5)
ERYTHROCYTE [DISTWIDTH] IN BLOOD BY AUTOMATED COUNT: 16.4 % (ref 11.5–14.5)
EST. GFR  (NO RACE VARIABLE): >60 ML/MIN/1.73 M^2
FRACTIONAL SHORTENING: 19 % (ref 28–44)
GLUCOSE SERPL-MCNC: 216 MG/DL (ref 70–110)
HCT VFR BLD AUTO: 30.1 % (ref 37–48.5)
HCT VFR BLD AUTO: 30.7 % (ref 37–48.5)
HGB BLD-MCNC: 9 G/DL (ref 12–16)
HGB BLD-MCNC: 9.2 G/DL (ref 12–16)
IMM GRANULOCYTES # BLD AUTO: 0.02 K/UL (ref 0–0.04)
IMM GRANULOCYTES # BLD AUTO: 0.03 K/UL (ref 0–0.04)
IMM GRANULOCYTES NFR BLD AUTO: 0.2 % (ref 0–0.5)
IMM GRANULOCYTES NFR BLD AUTO: 0.3 % (ref 0–0.5)
INR PPP: 1.2 (ref 0.8–1.2)
INTERVENTRICULAR SEPTUM: 0.87 CM (ref 0.6–1.1)
LA MAJOR: 4.84 CM
LA MINOR: 4.84 CM
LA WIDTH: 3.8 CM
LDH SERPL L TO P-CCNC: 1.73 MMOL/L (ref 0.36–1.25)
LEFT ATRIUM SIZE: 3.68 CM
LEFT ATRIUM VOLUME INDEX MOD: 24.4 ML/M2
LEFT ATRIUM VOLUME INDEX: 35.5 ML/M2
LEFT ATRIUM VOLUME MOD: 39.5 CM3
LEFT ATRIUM VOLUME: 57.53 CM3
LEFT INTERNAL DIMENSION IN SYSTOLE: 3.92 CM (ref 2.1–4)
LEFT VENTRICLE DIASTOLIC VOLUME INDEX: 68.19 ML/M2
LEFT VENTRICLE DIASTOLIC VOLUME: 110.46 ML
LEFT VENTRICLE MASS INDEX: 90 G/M2
LEFT VENTRICLE SYSTOLIC VOLUME INDEX: 41.1 ML/M2
LEFT VENTRICLE SYSTOLIC VOLUME: 66.66 ML
LEFT VENTRICULAR INTERNAL DIMENSION IN DIASTOLE: 4.86 CM (ref 3.5–6)
LEFT VENTRICULAR MASS: 146.47 G
LV LATERAL E/E' RATIO: 8.57 M/S
LV SEPTAL E/E' RATIO: 15 M/S
LYMPHOCYTES # BLD AUTO: 1.2 K/UL (ref 1–4.8)
LYMPHOCYTES # BLD AUTO: 1.4 K/UL (ref 1–4.8)
LYMPHOCYTES NFR BLD: 11.6 % (ref 18–48)
LYMPHOCYTES NFR BLD: 15.5 % (ref 18–48)
MAGNESIUM SERPL-MCNC: 2.4 MG/DL (ref 1.6–2.6)
MCH RBC QN AUTO: 24.8 PG (ref 27–31)
MCH RBC QN AUTO: 24.9 PG (ref 27–31)
MCHC RBC AUTO-ENTMCNC: 29.9 G/DL (ref 32–36)
MCHC RBC AUTO-ENTMCNC: 30 G/DL (ref 32–36)
MCV RBC AUTO: 83 FL (ref 82–98)
MCV RBC AUTO: 83 FL (ref 82–98)
MONOCYTES # BLD AUTO: 0.8 K/UL (ref 0.3–1)
MONOCYTES # BLD AUTO: 0.9 K/UL (ref 0.3–1)
MONOCYTES NFR BLD: 8.8 % (ref 4–15)
MONOCYTES NFR BLD: 9 % (ref 4–15)
MV A" WAVE DURATION": 7.61 MSEC
MV PEAK A VEL: 0.81 M/S
MV PEAK E VEL: 0.6 M/S
MV STENOSIS PRESSURE HALF TIME: 47 MS
MV VALVE AREA P 1/2 METHOD: 4.68 CM2
NEUTROPHILS # BLD AUTO: 6.7 K/UL (ref 1.8–7.7)
NEUTROPHILS # BLD AUTO: 8.1 K/UL (ref 1.8–7.7)
NEUTROPHILS NFR BLD: 73 % (ref 38–73)
NEUTROPHILS NFR BLD: 76.3 % (ref 38–73)
NRBC BLD-RTO: 0 /100 WBC
NRBC BLD-RTO: 0 /100 WBC
PLATELET # BLD AUTO: 297 K/UL (ref 150–450)
PLATELET # BLD AUTO: 313 K/UL (ref 150–450)
PMV BLD AUTO: 11.9 FL (ref 9.2–12.9)
PMV BLD AUTO: 12.1 FL (ref 9.2–12.9)
POC ACTIVATED CLOTTING TIME K: 287 SEC (ref 74–137)
POC ACTIVATED CLOTTING TIME K: 317 SEC (ref 74–137)
POCT GLUCOSE: 224 MG/DL (ref 70–110)
POCT GLUCOSE: 261 MG/DL (ref 70–110)
POTASSIUM SERPL-SCNC: 3.5 MMOL/L (ref 3.5–5.1)
PROTHROMBIN TIME: 12.7 SEC (ref 9–12.5)
PULM VEIN S/D RATIO: 1.02
PV PEAK D VEL: 0.41 M/S
PV PEAK S VEL: 0.42 M/S
RA MAJOR: 4.65 CM
RA PRESSURE ESTIMATED: 3 MMHG
RA WIDTH: 3.47 CM
RBC # BLD AUTO: 3.63 M/UL (ref 4–5.4)
RBC # BLD AUTO: 3.7 M/UL (ref 4–5.4)
SAMPLE: ABNORMAL
SINUS: 2.96 CM
SODIUM SERPL-SCNC: 142 MMOL/L (ref 136–145)
STJ: 2.09 CM
TDI LATERAL: 0.07 M/S
TDI SEPTAL: 0.04 M/S
TDI: 0.06 M/S
WBC # BLD AUTO: 10.57 K/UL (ref 3.9–12.7)
WBC # BLD AUTO: 9.15 K/UL (ref 3.9–12.7)
Z-SCORE OF LEFT VENTRICULAR DIMENSION IN END DIASTOLE: 0.59
Z-SCORE OF LEFT VENTRICULAR DIMENSION IN END SYSTOLE: 2.54

## 2023-08-15 PROCEDURE — 99222 1ST HOSP IP/OBS MODERATE 55: CPT | Mod: ,,, | Performed by: PHYSICIAN ASSISTANT

## 2023-08-15 PROCEDURE — 94761 N-INVAS EAR/PLS OXIMETRY MLT: CPT

## 2023-08-15 PROCEDURE — 25500020 PHARM REV CODE 255: Performed by: INTERNAL MEDICINE

## 2023-08-15 PROCEDURE — 25000003 PHARM REV CODE 250: Performed by: INTERNAL MEDICINE

## 2023-08-15 PROCEDURE — 85730 THROMBOPLASTIN TIME PARTIAL: CPT | Performed by: INTERNAL MEDICINE

## 2023-08-15 PROCEDURE — 97162 PT EVAL MOD COMPLEX 30 MIN: CPT

## 2023-08-15 PROCEDURE — 85025 COMPLETE CBC W/AUTO DIFF WBC: CPT | Mod: 91 | Performed by: INTERNAL MEDICINE

## 2023-08-15 PROCEDURE — 85610 PROTHROMBIN TIME: CPT | Performed by: INTERNAL MEDICINE

## 2023-08-15 PROCEDURE — 94660 CPAP INITIATION&MGMT: CPT

## 2023-08-15 PROCEDURE — 27000190 HC CPAP FULL FACE MASK W/VALVE

## 2023-08-15 PROCEDURE — 63600175 PHARM REV CODE 636 W HCPCS

## 2023-08-15 PROCEDURE — 83735 ASSAY OF MAGNESIUM: CPT | Performed by: INTERNAL MEDICINE

## 2023-08-15 PROCEDURE — 99222 PR INITIAL HOSPITAL CARE,LEVL II: ICD-10-PCS | Mod: ,,, | Performed by: PHYSICIAN ASSISTANT

## 2023-08-15 PROCEDURE — 63600175 PHARM REV CODE 636 W HCPCS: Performed by: PHYSICIAN ASSISTANT

## 2023-08-15 PROCEDURE — 99233 SBSQ HOSP IP/OBS HIGH 50: CPT | Mod: GC,,, | Performed by: INTERNAL MEDICINE

## 2023-08-15 PROCEDURE — 97116 GAIT TRAINING THERAPY: CPT

## 2023-08-15 PROCEDURE — 80048 BASIC METABOLIC PNL TOTAL CA: CPT | Performed by: INTERNAL MEDICINE

## 2023-08-15 PROCEDURE — 20600001 HC STEP DOWN PRIVATE ROOM

## 2023-08-15 PROCEDURE — 99900035 HC TECH TIME PER 15 MIN (STAT)

## 2023-08-15 PROCEDURE — 99233 PR SUBSEQUENT HOSPITAL CARE,LEVL III: ICD-10-PCS | Mod: GC,,, | Performed by: INTERNAL MEDICINE

## 2023-08-15 RX ORDER — ONDANSETRON 2 MG/ML
4 INJECTION INTRAMUSCULAR; INTRAVENOUS EVERY 6 HOURS PRN
Status: DISCONTINUED | OUTPATIENT
Start: 2023-08-15 | End: 2023-08-16

## 2023-08-15 RX ORDER — ENOXAPARIN SODIUM 100 MG/ML
1 INJECTION SUBCUTANEOUS EVERY 12 HOURS
Status: DISCONTINUED | OUTPATIENT
Start: 2023-08-15 | End: 2023-08-19

## 2023-08-15 RX ORDER — ENOXAPARIN SODIUM 100 MG/ML
1 INJECTION SUBCUTANEOUS EVERY 12 HOURS
Status: DISCONTINUED | OUTPATIENT
Start: 2023-08-15 | End: 2023-08-15

## 2023-08-15 RX ORDER — ONDANSETRON 2 MG/ML
4 INJECTION INTRAMUSCULAR; INTRAVENOUS ONCE
Status: COMPLETED | OUTPATIENT
Start: 2023-08-15 | End: 2023-08-15

## 2023-08-15 RX ORDER — IBUPROFEN 200 MG
16 TABLET ORAL
Status: DISCONTINUED | OUTPATIENT
Start: 2023-08-15 | End: 2023-08-16

## 2023-08-15 RX ORDER — GLUCAGON 1 MG
1 KIT INJECTION
Status: DISCONTINUED | OUTPATIENT
Start: 2023-08-15 | End: 2023-08-16

## 2023-08-15 RX ORDER — INSULIN ASPART 100 [IU]/ML
3 INJECTION, SOLUTION INTRAVENOUS; SUBCUTANEOUS
Status: DISCONTINUED | OUTPATIENT
Start: 2023-08-15 | End: 2023-08-16

## 2023-08-15 RX ORDER — IBUPROFEN 200 MG
24 TABLET ORAL
Status: DISCONTINUED | OUTPATIENT
Start: 2023-08-15 | End: 2023-08-16

## 2023-08-15 RX ORDER — POTASSIUM CHLORIDE 750 MG/1
30 CAPSULE, EXTENDED RELEASE ORAL
Status: COMPLETED | OUTPATIENT
Start: 2023-08-15 | End: 2023-08-15

## 2023-08-15 RX ORDER — ONDANSETRON 2 MG/ML
INJECTION INTRAMUSCULAR; INTRAVENOUS
Status: COMPLETED
Start: 2023-08-15 | End: 2023-08-15

## 2023-08-15 RX ORDER — TALC
9 POWDER (GRAM) TOPICAL ONCE
Status: DISCONTINUED | OUTPATIENT
Start: 2023-08-15 | End: 2023-08-15

## 2023-08-15 RX ORDER — TALC
9 POWDER (GRAM) TOPICAL NIGHTLY PRN
Status: DISCONTINUED | OUTPATIENT
Start: 2023-08-15 | End: 2023-09-01 | Stop reason: HOSPADM

## 2023-08-15 RX ORDER — INSULIN ASPART 100 [IU]/ML
0-5 INJECTION, SOLUTION INTRAVENOUS; SUBCUTANEOUS
Status: DISCONTINUED | OUTPATIENT
Start: 2023-08-15 | End: 2023-08-16

## 2023-08-15 RX ADMIN — ONDANSETRON 4 MG: 2 INJECTION INTRAMUSCULAR; INTRAVENOUS at 11:08

## 2023-08-15 RX ADMIN — ASPIRIN 81 MG: 81 TABLET, COATED ORAL at 08:08

## 2023-08-15 RX ADMIN — ONDANSETRON 4 MG: 2 INJECTION INTRAMUSCULAR; INTRAVENOUS at 07:08

## 2023-08-15 RX ADMIN — CEFPODOXIME PROXETIL 400 MG: 200 TABLET, FILM COATED ORAL at 08:08

## 2023-08-15 RX ADMIN — GABAPENTIN 600 MG: 300 CAPSULE ORAL at 08:08

## 2023-08-15 RX ADMIN — SPIRONOLACTONE 12.5 MG: 25 TABLET ORAL at 08:08

## 2023-08-15 RX ADMIN — LOSARTAN POTASSIUM 25 MG: 25 TABLET, FILM COATED ORAL at 08:08

## 2023-08-15 RX ADMIN — Medication 400 MG: at 08:08

## 2023-08-15 RX ADMIN — ATORVASTATIN CALCIUM 40 MG: 40 TABLET, FILM COATED ORAL at 08:08

## 2023-08-15 RX ADMIN — Medication 9 MG: at 10:08

## 2023-08-15 RX ADMIN — ESCITALOPRAM OXALATE 10 MG: 10 TABLET ORAL at 08:08

## 2023-08-15 RX ADMIN — HUMAN ALBUMIN MICROSPHERES AND PERFLUTREN 0.11 MG: 10; .22 INJECTION, SOLUTION INTRAVENOUS at 12:08

## 2023-08-15 RX ADMIN — APIXABAN 5 MG: 5 TABLET, FILM COATED ORAL at 08:08

## 2023-08-15 RX ADMIN — CLOPIDOGREL BISULFATE 600 MG: 300 TABLET, FILM COATED ORAL at 08:08

## 2023-08-15 RX ADMIN — METOPROLOL SUCCINATE 12.5 MG: 25 TABLET, EXTENDED RELEASE ORAL at 08:08

## 2023-08-15 RX ADMIN — INSULIN ASPART 3 UNITS: 100 INJECTION, SOLUTION INTRAVENOUS; SUBCUTANEOUS at 05:08

## 2023-08-15 RX ADMIN — GABAPENTIN 300 MG: 300 CAPSULE ORAL at 08:08

## 2023-08-15 RX ADMIN — POTASSIUM CHLORIDE 30 MEQ: 10 CAPSULE, COATED, EXTENDED RELEASE ORAL at 05:08

## 2023-08-15 RX ADMIN — ENOXAPARIN SODIUM 60 MG: 60 INJECTION SUBCUTANEOUS at 08:08

## 2023-08-15 RX ADMIN — POTASSIUM CHLORIDE 30 MEQ: 10 CAPSULE, COATED, EXTENDED RELEASE ORAL at 08:08

## 2023-08-15 NOTE — CONSULTS
"[facility]  Endocrinology  Diabetes Consult Note    Consult Requested by: Wilman Kim MD   Reason for admit: <principal problem not specified>    HISTORY OF PRESENT ILLNESS:  Reason for Consult: Management of T2DM,       Diabetes diagnosis year: pt not sure - states "long time ago"; at least as far back as 2004 per review of elevated a1c in epic     Home Diabetes Medications:  Glipizide ER 10mg before breakfast. Levemir 2u daily on a sliding scale   How often checking glucose at home?  Usually 2x/day,   BG readings on regimen: Upper 100s- 200s  Hypoglycemia on the regimen? Lows on 12 units of Levemir  Missed doses on regimen?  Diabetes Complications include:     Hyperglycemia, Diabetic chronic kidney disease     , Diabetic peripheral neuropathy , and Diabetic peripheral angiopathy with/without gangrene     Complicating diabetes co morbidities:   History of MI, MURTAZA, and CKD        HPI:   Patient is a 63 y.o. female with CAD with GINGER , HTN, HLD, DM2, CKD, MURTAZA (not on CPAP), PAD, who presented to the ED to the ER after a near syncopal event at home around 4 pm. She also feels tired and her appetite lately has decreased. she had chest pain and visceral symptoms with nausea and vomiting. She reports losing weight as well.She has had abdominal symptoms with poor PO intake leading to orthostasis. Today she had another near syncopal event and is why she was brought to the ER. Here her ECG showed the posterior changes for which posterior EKG was obtained with showed STEMI. Bedside echo showed an EF 45-50% and norma-lateral and infero-lateral hypokinesis. She was taken to cath lab and her symptoms resolved after PCI. Please refer to PCI for full details. Endocrine consulted for bg management.      Interval HPI:   Overnight events: No acute events overnight. Patient in room 348/348 A. Blood glucose stable. BG at goal on current insulin regimen (SSI, prandial, and basal insulin ). Steroid use- " "None .   1 Day Post-Op  Renal function- Normal   Vasopressors-  None     Diet diabetic 1500 Calorie     Eatin%  Nausea: No  Hypoglycemia and intervention: No  Fever: No  TPN and/or TF: No    BP (!) 146/72   Pulse 76   Temp 97.6 °F (36.4 °C) (Oral)   Resp 18   Ht 5' 4" (1.626 m)   Wt 58.1 kg (128 lb)   LMP  (LMP Unknown)   SpO2 99%   BMI 21.97 kg/m²     Labs Reviewed and Include    Recent Labs   Lab 23  1936 08/15/23  0305   * 216*   CALCIUM 9.7 8.1*   ALBUMIN 2.8*  --    PROT 9.4*  --     142   K 3.4* 3.5   CO2 29 32*   CL 89* 98   BUN 24* 22   CREATININE 1.2 1.0   ALKPHOS 785*  --    ALT 19  --    AST 36  --    BILITOT 1.1*  --      Lab Results   Component Value Date    WBC 10.57 08/15/2023    HGB 9.2 (L) 08/15/2023    HCT 30.7 (L) 08/15/2023    MCV 83 08/15/2023     08/15/2023     No results for input(s): "TSH", "FREET4" in the last 168 hours.  Lab Results   Component Value Date    HGBA1C 8.8 (H) 07/10/2023       Nutritional status:   Body mass index is 21.97 kg/m².  Lab Results   Component Value Date    ALBUMIN 2.8 (L) 2023    ALBUMIN 3.8 2023    ALBUMIN 1.9 (L) 2023     No results found for: "PREALBUMIN"    Estimated Creatinine Clearance: 50.4 mL/min (based on SCr of 1 mg/dL).    Accu-Checks  No results for input(s): "POCTGLUCOSE" in the last 72 hours.    Current Medications and/or Treatments Impacting Glycemic Control  Immunotherapy:    Immunosuppressants       None          Steroids:   Hormones (From admission, onward)      Start     Stop Route Frequency Ordered    08/15/23 0046  melatonin tablet 9 mg         -- Oral Once 08/15/23 0046          Pressors:    Autonomic Drugs (From admission, onward)      Start     Stop Route Frequency Ordered    08/15/23 0900  metoprolol succinate (TOPROL-XL) 24 hr split tablet 12.5 mg         -- Oral Daily 23          Hyperglycemia/Diabetes Medications:   Antihyperglycemics (From admission, onward)      " "Start     Stop Route Frequency Ordered    08/15/23 1645  insulin aspart U-100 pen 3 Units         -- SubQ 3 times daily with meals 08/15/23 1235    08/15/23 1345  insulin detemir U-100 (Levemir) pen 8 Units         -- SubQ Daily 08/15/23 1235            Labs Reviewed and Include   Recent Labs   Lab 08/14/23  1936 08/15/23  0305   * 216*   CALCIUM 9.7 8.1*   ALBUMIN 2.8*  --    PROT 9.4*  --     142   K 3.4* 3.5   CO2 29 32*   CL 89* 98   BUN 24* 22   CREATININE 1.2 1.0   ALKPHOS 785*  --    ALT 19  --    AST 36  --    BILITOT 1.1*  --      Lab Results   Component Value Date    WBC 10.57 08/15/2023    HGB 9.2 (L) 08/15/2023    HCT 30.7 (L) 08/15/2023    MCV 83 08/15/2023     08/15/2023     No results for input(s): "TSH", "FREET4" in the last 168 hours.  Lab Results   Component Value Date    HGBA1C 8.8 (H) 07/10/2023       Nutritional status:   Body mass index is 21.97 kg/m².  Lab Results   Component Value Date    ALBUMIN 2.8 (L) 08/14/2023    ALBUMIN 3.8 08/12/2023    ALBUMIN 1.9 (L) 08/07/2023     No results found for: "PREALBUMIN"    Estimated Creatinine Clearance: 50.4 mL/min (based on SCr of 1 mg/dL).    Accu-Checks  No results for input(s): "POCTGLUCOSE" in the last 72 hours.     ASSESSMENT and PLAN    Cardiac/Vascular  CAD (coronary artery disease)    Managed per primary team      Hyperlipidemia associated with type 2 diabetes mellitus  Managed per primary.   On statin per ADA      Endocrine  Type 2 diabetes mellitus with diabetic peripheral angiopathy without gangrene  BG goal: 140-180   T2DM.  Reports significant low on 12 units of Levemir previously. Will monitor on WBD ~0.3 and adjust as needed.    - -Start Levemir   8 units daily  - Start Novolog 3 units AC  - Continue LDC 50/150  - POCT Glucose before meals and at bedtime  - Hypoglycemia protocol in place      ** Please notify Endocrine for any change and/or advance in diet**  ** Please call Endocrine for any BG related issues **   "   Discharge Planning:   TBD. Please notify endocrinology prior to discharge.              Plan discussed with patient, family, and RN at bedside.     Tomasz Betancourt PA-C  Endocrinology  [facility]

## 2023-08-15 NOTE — SUBJECTIVE & OBJECTIVE
Review of Systems   Constitutional: Positive for decreased appetite and malaise/fatigue. Negative for chills and fever.   HENT: Negative.     Cardiovascular:  Positive for chest pain. Negative for leg swelling and palpitations.   Respiratory: Negative.  Negative for cough and shortness of breath.    Musculoskeletal: Negative.    Gastrointestinal:  Positive for nausea and vomiting. Negative for abdominal pain.   Genitourinary: Negative.    Neurological: Negative.      Objective:     Vital Signs (Most Recent):  Temp: 97.7 °F (36.5 °C) (08/14/23 2148)  Pulse: 70 (08/14/23 2317)  Resp: 17 (08/14/23 2317)  BP: (!) 105/52 (08/14/23 2317)  SpO2: 100 % (08/14/23 2317) Vital Signs (24h Range):  Temp:  [97.7 °F (36.5 °C)-98.1 °F (36.7 °C)] 97.7 °F (36.5 °C)  Pulse:  [70-96] 70  Resp:  [11-18] 17  SpO2:  [97 %-100 %] 100 %  BP: (105-154)/(52-81) 105/52     Weight: 58.1 kg (128 lb)  Body mass index is 21.97 kg/m².     SpO2: 100 %       No intake or output data in the 24 hours ending 08/15/23 0216      Lines/Drains/Airways       Peripheral Intravenous Line  Duration                  Peripheral IV - Single Lumen 08/14/23 1941 18 G Right Antecubital <1 day                       Physical Exam  Vitals and nursing note reviewed.   Constitutional:       General: She is not in acute distress.     Appearance: She is not toxic-appearing or diaphoretic.   HENT:      Head: Normocephalic and atraumatic.      Mouth/Throat:      Mouth: Mucous membranes are moist.      Pharynx: Oropharynx is clear.   Eyes:      Extraocular Movements: Extraocular movements intact.      Conjunctiva/sclera: Conjunctivae normal.   Cardiovascular:      Rate and Rhythm: Normal rate and regular rhythm.   Pulmonary:      Effort: Pulmonary effort is normal. No respiratory distress.      Breath sounds: No wheezing.   Abdominal:      General: There is no distension.      Palpations: Abdomen is soft.      Tenderness: There is no abdominal tenderness. There is no guarding.    Musculoskeletal:         General: No tenderness. Normal range of motion.      Cervical back: Normal range of motion and neck supple.      Right lower leg: No edema.      Left lower leg: No edema.   Skin:     General: Skin is warm and dry.   Neurological:      Mental Status: She is alert and oriented to person, place, and time. Mental status is at baseline.      Cranial Nerves: No dysarthria.   Psychiatric:         Mood and Affect: Mood normal.         Behavior: Behavior normal.            Significant Labs: All pertinent lab results from the last 24 hours have been reviewed.    Significant Imaging:  All relevant imaging reviewed

## 2023-08-15 NOTE — PLAN OF CARE
Plan of care reviewed with pt, verbalized understanding. No acute events overnight. Pt arrived from cath lab approx 2145 for code STEMI. GINGER and thrombectomy via right fem. Pulses palpable, no c/o numbness or tingling. Slight oozing right fem, pressure held for 10 min. MD Reese at bedside holding pressure. Dressing changed, oozing resolved. Lytes checked this AM, replacement orders in place. R fem oozing again this morning, MD holding pressure. Pt actively vomiting, Verbal order for zofran 4 mg, administered.       Problem: Adult Inpatient Plan of Care  Goal: Plan of Care Review  Outcome: Ongoing, Progressing  Goal: Patient-Specific Goal (Individualized)  Outcome: Ongoing, Progressing  Goal: Absence of Hospital-Acquired Illness or Injury  Outcome: Ongoing, Progressing  Goal: Optimal Comfort and Wellbeing  Outcome: Ongoing, Progressing

## 2023-08-15 NOTE — PROCEDURES
Brief Operative Note:    : Wilman Kim MD     Referring Physician: Self,Aaareferral     All Operators: Surgeon(s):  Wilman Kim MD Maitas, Oscar, MD Rai Shahjehan, MD    Preoperative Diagnosis: Acute ST elevation myocardial infarction (STEMI) of posterior wall [I21.29]     Postop Diagnosis: Acute ST elevation myocardial infarction (STEMI) of posterior wall [I21.29]  s/p PCI    Treatments/Procedures: Procedure(s) (LRB):  Angiogram, Coronary, with Left Heart Cath (N/A)  IVUS, Coronary  Stent, Drug Eluting, Single Vessel, Coronary    Access: R CFA    Intervention:     Successful primary PCI of ramus intermedius and lateral branch with Pronto thrombectomy and 3X16 GINGER    IVUS utilized for stent sizing    Mild ISR of LAD stents    The pre-procedure left ventricular end diastolic pressure was 12.    The estimated blood loss was <50 mL.    Closure device: angioseal    Plan:  - Post cath protocol   - Admit to CICU with CCU service  - got 750 cc in the lab and additional IVF @ 100 cc/hr x 12 hours, LVEDP 9  - Bed rest x 4 hours   - Continue aspirin 81 mg daily indefinitely, stop after 1 month to avoid triple therapy  - Loaded with brilinta 180 today, load with plavix 600 tomorrow and 75 qd there afterr  - cont eliuqis for afib  - Continue high intensity statin therapy (LDL goal < 70)  - Risk factor reduction (BP <130/80 mmHg, glycemic control, etc)  - Cardiac rehab referral    Estimated Blood loss: 20 cc    Specimens removed: No    Shen Reese MD  Interventional Cardiology PGY-5  08/14/2023

## 2023-08-15 NOTE — HPI
"Reason for Consult: Management of T2DM,       Diabetes diagnosis year: pt not sure - states "long time ago"; at least as far back as 2004 per review of elevated a1c in epic     Home Diabetes Medications:  Glipizide ER 10mg before breakfast. Levemir 2u daily on a sliding scale   How often checking glucose at home?  Usually 2x/day,   BG readings on regimen: Upper 100s- 200s  Hypoglycemia on the regimen? Lows on 12 units of Levemir  Missed doses on regimen?  Diabetes Complications include:     Hyperglycemia, Diabetic chronic kidney disease     , Diabetic peripheral neuropathy , and Diabetic peripheral angiopathy with/without gangrene     Complicating diabetes co morbidities:   History of MI, MURTAZA, and CKD        HPI:   Patient is a 63 y.o. female with CAD with GINGER , HTN, HLD, DM2, CKD, MURTAZA (not on CPAP), PAD, who presented to the ED to the ER after a near syncopal event at home around 4 pm. She also feels tired and her appetite lately has decreased. she had chest pain and visceral symptoms with nausea and vomiting. She reports losing weight as well.She has had abdominal symptoms with poor PO intake leading to orthostasis. Today she had another near syncopal event and is why she was brought to the ER. Here her ECG showed the posterior changes for which posterior EKG was obtained with showed STEMI. Bedside echo showed an EF 45-50% and norma-lateral and infero-lateral hypokinesis. She was taken to cath lab and her symptoms resolved after PCI. Please refer to PCI for full details. Endocrine consulted for bg management.  "

## 2023-08-15 NOTE — H&P
[facility]  Cardiology  History and Physical     Patient Name: Mariann Huff  MRN: [id number]  Admission Date: 8/14/2023  Code Status: Full Code   Attending Provider: Wilman Kim MD   Primary Care Physician: Jasbir Haney MD  Principal Problem:<principal problem not specified>    Patient information was obtained from patient and ER records.     Subjective:     Chief Complaint:  Chest pain     HPI:  Mariann Huff is a 63 y.o. female with CAD s/p GINGER to ostial LAD in 2014, HTN, HLD, DM2, MURTAZA who presented to the ED to the ER after a near syncopal event at home around 4 pm. She also feels tired and her appetite lately has decreased. she had chest pain and visceral symptoms with nausea and vomiting. She reports losing weight as well.She has had abdominal symptoms with poor PO intake leading to orthostasis. Today she had another near syncopal event and is why she was brought to the ER. Here her ECG showed the posterior changes for which posterior EKG was obtained with showed STEMI. Bedside echo showed an EF 45-50% and norma-lateral and infero-lateral hypokinesis. She was taken to cath lab and her symptoms resolved after PCI. Please refer to PCI for full details.     [facility] 8/3/23 (For NSTEMI with Trop up to 29)    The Ramus lesion was 70% stenosed.    The Ost Cx to Prox Cx lesion was 99% stenosed.    The ejection fraction was calculated to be 45%.    The pre-procedure left ventricular end diastolic pressure was 15.    The estimated blood loss was none.      Echo 8/3/23    Left Ventricle: The left ventricle is normal in size. Ventricular mass is normal. Normal wall thickness. regional wall motion abnormalities present. There is mildly reduced systolic function with a visually estimated ejection fraction of 40 - 45%.    Right Ventricle: Normal right ventricular cavity size. Systolic function is normal.    Mitral Valve: There is mild regurgitation with a centrally directed jet.     IVC/SVC: Normal venous pressure at 3 mmHg.    No LV thrombus seen, wall motion abnormalities appear to be in LCx distribution.      Review of Systems   Constitutional: Positive for decreased appetite and malaise/fatigue. Negative for chills and fever.   HENT: Negative.     Cardiovascular:  Positive for chest pain. Negative for leg swelling and palpitations.   Respiratory: Negative.  Negative for cough and shortness of breath.    Musculoskeletal: Negative.    Gastrointestinal:  Positive for nausea and vomiting. Negative for abdominal pain.   Genitourinary: Negative.    Neurological: Negative.      Objective:     Vital Signs (Most Recent):  Temp: 97.7 °F (36.5 °C) (08/14/23 2148)  Pulse: 70 (08/14/23 2317)  Resp: 17 (08/14/23 2317)  BP: (!) 105/52 (08/14/23 2317)  SpO2: 100 % (08/14/23 2317) Vital Signs (24h Range):  Temp:  [97.7 °F (36.5 °C)-98.1 °F (36.7 °C)] 97.7 °F (36.5 °C)  Pulse:  [70-96] 70  Resp:  [11-18] 17  SpO2:  [97 %-100 %] 100 %  BP: (105-154)/(52-81) 105/52     Weight: 58.1 kg (128 lb)  Body mass index is 21.97 kg/m².     SpO2: 100 %       No intake or output data in the 24 hours ending 08/15/23 0216      Lines/Drains/Airways       Peripheral Intravenous Line  Duration                  Peripheral IV - Single Lumen 08/14/23 1941 18 G Right Antecubital <1 day                       Physical Exam  Vitals and nursing note reviewed.   Constitutional:       General: She is not in acute distress.     Appearance: She is not toxic-appearing or diaphoretic.   HENT:      Head: Normocephalic and atraumatic.      Mouth/Throat:      Mouth: Mucous membranes are moist.      Pharynx: Oropharynx is clear.   Eyes:      Extraocular Movements: Extraocular movements intact.      Conjunctiva/sclera: Conjunctivae normal.   Cardiovascular:      Rate and Rhythm: Normal rate and regular rhythm.   Pulmonary:      Effort: Pulmonary effort is normal. No respiratory distress.      Breath sounds: No wheezing.   Abdominal:       General: There is no distension.      Palpations: Abdomen is soft.      Tenderness: There is no abdominal tenderness. There is no guarding.   Musculoskeletal:         General: No tenderness. Normal range of motion.      Cervical back: Normal range of motion and neck supple.      Right lower leg: No edema.      Left lower leg: No edema.   Skin:     General: Skin is warm and dry.   Neurological:      Mental Status: She is alert and oriented to person, place, and time. Mental status is at baseline.      Cranial Nerves: No dysarthria.   Psychiatric:         Mood and Affect: Mood normal.         Behavior: Behavior normal.            Significant Labs: All pertinent lab results from the last 24 hours have been reviewed.    Significant Imaging:  All relevant imaging reviewed    Assessment and Plan:     Posterior STEMI  -Started having sofia syncope around 4 pm  -Bedside echo in the ER showed an EF 45-50% and norma-lateral and infero-lateral hypokinesis  -She underwent Successful primary PCI of ramus intermedius and lateral branch with Pronto thrombectomy and 3X16 GINGER    Plan  - got 750 cc in the lab and additional IVF @ 100 cc/hr x 12 hours, LVEDP 9 and appeared volume down (was discharged on lasix 40 daily for ADHF, might have to discharge home this time on 20 daily lasix)  - Bed rest x 4 hours   - Continue aspirin 81 mg daily, stop after 1 month to avoid triple therapy  - Loaded with brilinta 180 today, load with plavix 600 tomorrow and 75 qd there after  - Cont eliuqis for afib  - Continue high intensity statin therapy (LDL goal < 70)  - repeat echo    Liver lesions with possible pancreatic mass  -s/p biliary pancreatitis s/p sphincterotomy, biliary stent, cholecystectomy in 06/2023  -Imaging revealed liver lesions concerning for abscess  -was seen by ID last admit on 8/7/23 and started on cefpodoxime 400 po bid by Dr Archibald, continue the same  -would need liver lesions biopsy when feasible to r/o malignancy  -will need  hepatology and ID f/up post discharge, refer to their notes from last week       CHF  Last week had CXR with B/L edema, , EF decreased to 40-45%  Got IV lasix while inpatient and was discharged on 40 po daily however became dehydrated and weak and LVEDP in the lab showed LVEDP 9  Getting  cc/h x 10 hours      History of pancreatitis  Recent (6/2023) suspected biliary pancreatitis and biliary stricture treated with biliary sphincterotomy, biliary stent, and cholecystectomy.  CT A/P with new innumerable hepatic lesions:  1. Innumerable low attenuating hepatic lesions, new since the previous exam.  Malignancy remains a concern however given short-term development, correlation is needed to exclude multifocal infection/abscess in this patient status post bile duct stent placement and cholecystectomy.  Please note, there is a low attenuating focus within the gallbladder fossa, similar to the foci throughout the hepatic parenchyma..  2. Pancreatic ductal dilatation up to 4 mm, minimally increased compared to prior.  There is fullness of the pancreatic head, underlying mass is not excluded, correlation with recent ERCP advised.  3. Simple and complex bilateral renal cysts.  4. Biliary stent in place with expected pneumobilia.  5. Cholecystectomy with scattered fluid about the gallbladder fossa.  6. Moderate stool throughout the colon, may reflect developing constipation.  7.  Additional findings as above.     - concern for lesions seen on CT A/P  - hepatology consulted last admit, appreciate assistance     Type 2 diabetes mellitus with chronic kidney disease  -Last A1c reviewed-         Lab Results   Component Value Date     HGBA1C 8.8 (H) 07/10/2023         Home Antihyperglycemic Regiment:  -Farxiga, glipizide, insulin daily at home     - Titrate and addition of insulin as needed for BG goal 140-180  - SSI with POCT accuchecks ACHS and Diabetic diet 2000 beti  - Diabetic nutritional counseling given          Diabetic peripheral neuropathy associated with type 2 diabetes mellitus  Continue home gabapentin       Sleep apnea   CPAP qhs            VTE Risk Mitigation (From admission, onward)         Ordered     apixaban tablet 5 mg  2 times daily         08/14/23 2130                Shen Reese MD  Cardiology   [facility]

## 2023-08-15 NOTE — PLAN OF CARE
[facility] - Cardiology Stepdown  Initial Discharge Assessment       Primary Care Provider: Jasbri Haney MD    Admission Diagnosis: Syncope [R55]  Chest pain [R07.9]  Acute ST elevation myocardial infarction (STEMI) of posterior wall [I21.29]    Admission Date: 8/14/2023  Expected Discharge Date: 8/18/2023    Transition of Care Barriers: None    Payor: MEDICARE / Plan: MEDICARE PART A & B / Product Type: Government /     Extended Emergency Contact Information  Primary Emergency Contact: Shamir Huff  Address: [facility] [place] [address] [facility]  Home Phone: [phone]  Mobile Phone: [phone]  Relation: Spouse  Secondary Emergency Contact: Joseph Huff  Mobile Phone: [phone]  Relation: Son  Preferred language: English    Discharge Plan A: Home with family, Home Health  Discharge Plan B: Home with family      [facility]  [address]  [facility] [address]  Phone: [phone] Fax: [phone]    [facility]/pharmacy #[id number] - [address] AT CORNER OF [address] [address]  Phone: [phone] Fax: [phone]    [facility] - [address] [address]  Phone: [phone] Fax: [phone]      Initial Assessment (most recent)       Adult Discharge Assessment - 08/15/23 1428          Discharge Assessment    Assessment Type Discharge Planning Assessment     Confirmed/corrected address, phone number and insurance Yes     Confirmed Demographics Correct on Facesheet     Source of Information patient;family     Communicated REBECCA with patient/caregiver Date not available/Unable to determine     Reason For Admission Syncope     People in Home spouse     Facility Arrived From: Home     Do you expect to return to your current living situation? Yes     Do you have help at home or someone to help you manage your care at home? Yes      "Who are your caregiver(s) and their phone number(s)? Shamir Huff (spouse) [phone]     Prior to hospitilization cognitive status: Alert/Oriented     Current cognitive status: Alert/Oriented     Walking or Climbing Stairs ambulation difficulty, requires equipment     Mobility Management straight cane and transportation chair     Dressing/Bathing --   none    Home Accessibility wheelchair accessible     Equipment Currently Used at Home cane, straight;bedside commode;other (see comments)   transport chair    Readmission within 30 days? Yes     Patient currently being followed by outpatient case management? No     Do you currently have service(s) that help you manage your care at home? Yes     [facility]     Is the pt/caregiver preference to resume services with current agency Yes     Do you take prescription medications? Yes     Do you have prescription coverage? Yes     Coverage Medicare A&B and BCBS All out     Do you have any problems affording any of your prescribed medications? No     Is the patient taking medications as prescribed? yes     Who is going to help you get home at discharge? spouse     How do you get to doctors appointments? family or friend will provide     Are you on dialysis? No     Do you take coumadin? No     DME Needed Upon Discharge  none     Discharge Plan discussed with: Spouse/sig other;Patient     Name(s) and Number(s) Shamir Huff (spouse) [phone]     Transition of Care Barriers None     Discharge Plan A Home with family;Home Health     Discharge Plan B Home with family        OTHER    Name(s) of People in Home Shamir Huff (spouse) [phone]                     Readmission Assessment (most recent)       Readmission Assessment - 08/15/23 1522          Readmission    Why were you hospitalized in the last 30 days? short of breath andchest pain and "heart attack"     Why were you readmitted? Alarmed about signs/symptoms     When you left " the hospital how did you feel? good     When you left the hospital where did you go? Home with Family     Did patient/caregiver refused recommended DC plan? No     Tell me about what happened between when you left the hospital and the day you returned. gone for a short while and came back with shortness of breath and light headedness     When did you start not feeling well? yesterday     Did you try to manage your symptoms your self? No     Did you call anyone? Yes     Who did you call? Other (comments)    brought me in    Did you try to see or did see a doctor or nurse before you came? No     Why? just came back to hospital     Did you have  a follow-up appointment on discharge? Yes     Was this a planned readmission? No                      CM met with the patient and spouse at the bedside. Discussed the discharge process and gave her the discharge hand book . Wrote our contact numbers on the white board in the room. Patient lives in a ss house with spouse and spouse (Shamir Huff [phone]) will bring her home at time of discharge. She owns a cane (straight) and a travel chair if needed and a BSC. She reports not on dialysis and is not on cumadin. Does want BSD. She was current with [facility] . Will continue to monitor for discharge needs.     Conrado Louise RN CM   [phone]

## 2023-08-15 NOTE — ASSESSMENT & PLAN NOTE
BG goal: 140-180   T2DM.  Reports significant low on 12 units of Levemir previously. Will monitor on WBD ~0.3 and adjust as needed.    - -Start Levemir   8 units daily  - Start Novolog 3 units AC  - Continue LDC 50/150  - POCT Glucose before meals and at bedtime  - Hypoglycemia protocol in place      ** Please notify Endocrine for any change and/or advance in diet**  ** Please call Endocrine for any BG related issues **     Discharge Planning:   TBD. Please notify endocrinology prior to discharge.

## 2023-08-15 NOTE — SUBJECTIVE & OBJECTIVE
"Interval HPI:   Overnight events: No acute events overnight. Patient in room 348/348 A. Blood glucose stable. BG at goal on current insulin regimen (SSI, prandial, and basal insulin ). Steroid use- None .   1 Day Post-Op  Renal function- Normal   Vasopressors-  None     Diet diabetic 1500 Calorie     Eatin%  Nausea: No  Hypoglycemia and intervention: No  Fever: No  TPN and/or TF: No    BP (!) 146/72   Pulse 76   Temp 97.6 °F (36.4 °C) (Oral)   Resp 18   Ht 5' 4" (1.626 m)   Wt 58.1 kg (128 lb)   LMP  (LMP Unknown)   SpO2 99%   BMI 21.97 kg/m²     Labs Reviewed and Include    Recent Labs   Lab 23  1936 08/15/23  0305   * 216*   CALCIUM 9.7 8.1*   ALBUMIN 2.8*  --    PROT 9.4*  --     142   K 3.4* 3.5   CO2 29 32*   CL 89* 98   BUN 24* 22   CREATININE 1.2 1.0   ALKPHOS 785*  --    ALT 19  --    AST 36  --    BILITOT 1.1*  --      Lab Results   Component Value Date    WBC 10.57 08/15/2023    HGB 9.2 (L) 08/15/2023    HCT 30.7 (L) 08/15/2023    MCV 83 08/15/2023     08/15/2023     No results for input(s): "TSH", "FREET4" in the last 168 hours.  Lab Results   Component Value Date    HGBA1C 8.8 (H) 07/10/2023       Nutritional status:   Body mass index is 21.97 kg/m².  Lab Results   Component Value Date    ALBUMIN 2.8 (L) 2023    ALBUMIN 3.8 2023    ALBUMIN 1.9 (L) 2023     No results found for: "PREALBUMIN"    Estimated Creatinine Clearance: 50.4 mL/min (based on SCr of 1 mg/dL).    Accu-Checks  No results for input(s): "POCTGLUCOSE" in the last 72 hours.    Current Medications and/or Treatments Impacting Glycemic Control  Immunotherapy:    Immunosuppressants       None          Steroids:   Hormones (From admission, onward)      Start     Stop Route Frequency Ordered    08/15/23 0046  melatonin tablet 9 mg         -- Oral Once 08/15/23 0046          Pressors:    Autonomic Drugs (From admission, onward)      Start     Stop Route Frequency Ordered    08/15/23 0900 "  metoprolol succinate (TOPROL-XL) 24 hr split tablet 12.5 mg         -- Oral Daily 08/14/23 2130          Hyperglycemia/Diabetes Medications:   Antihyperglycemics (From admission, onward)      Start     Stop Route Frequency Ordered    08/15/23 1645  insulin aspart U-100 pen 3 Units         -- SubQ 3 times daily with meals 08/15/23 1235    08/15/23 1345  insulin detemir U-100 (Levemir) pen 8 Units         -- SubQ Daily 08/15/23 1235

## 2023-08-15 NOTE — PT/OT/SLP EVAL
Physical Therapy Evaluation    Patient Name:  Mariann Huff   MRN:  [id number]    Recommendations:     Discharge Recommendations: nursing facility, skilled   Discharge Equipment Recommendations: other (see comments) (TBD closer to discharge)   Barriers to discharge:  Increased need for caregiver assistance    Assessment:     Mariann Huff is a 63 y.o. female admitted with a medical diagnosis of <principal problem not specified>. Pt presents after recent hospitalization with STEMI. Pt and spouse agreeable to therapy session. Pt reports independence with ADLs PTA, and she reports using cane as needed for ambulation. She presents with the following impairments/functional limitations: weakness, impaired endurance, impaired self care skills, impaired functional mobility, gait instability, impaired balance, decreased lower extremity function, impaired cardiopulmonary response to activity. Pt now requiring increased assistance for all functional mobility due to weakness and decreased mobility. Pt limited by fatigue during session and she is not functioning at her baseline PLOF. Pt would benefit from continued therapy services at a skilled nursing facility at this time after discharge to remedy her impairments. Patient would benefit from skilled therapy services to maximize safety and independence, increase activity tolerance, decrease fall risk, decrease caregiver burden, improve QOL, improve patient's functional mobility, and decrease risk of contractures and pressure sores.      Rehab Prognosis: Good; patient would benefit from acute skilled PT services to address these deficits and reach maximum level of function.    Recent Surgery: Procedure(s) (LRB):  Angiogram, Coronary, with Left Heart Cath (N/A)  IVUS, Coronary  Stent, Drug Eluting, Single Vessel, Coronary 1 Day Post-Op    Plan:     During this hospitalization, patient to be seen 3 x/week to address the identified rehab impairments via gait training, therapeutic  activities, therapeutic exercises, neuromuscular re-education and progress toward the following goals:    Plan of Care Expires:  09/14/23    Subjective     Chief Complaint: weakness  Patient/Family Comments/goals: to get stronger  Pain/Comfort:  Pain Rating 1: 0/10  Pain Rating Post-Intervention 1: 0/10    Patients cultural, spiritual, Caodaism conflicts given the current situation: no    Living Environment:  Pt lives with  in a H with 1 DAVID. Pt reports that she is retired and drives occasionally.   Prior to admission, patients level of function was independent with ADLs. Pt reports ambulating with SPC as needed PTA.  Equipment used at home: cane, straight, bedside commode, other (see comments) (transport chair).  DME owned (not currently used): none.  Upon discharge, patient will have assistance from  at night; will be alone during the day.    Objective:     Communicated with RN prior to session.  Patient found supine with telemetry, pulse ox (continuous), blood pressure cuff, peripheral IV  upon PT entry to room.    General Precautions: Standard, fall  Orthopedic Precautions:    Braces:    Respiratory Status: Room air    Exams:  Cognitive Exam:  Patient is oriented to Person, Place, Time, and Situation  Gross Motor Coordination:  WFL  Postural Exam:  Patient presented with the following abnormalities: -       Rounded shoulders  -       Forward head  Sensation: -       Intact  RLE ROM: WFL  RLE Strength: grossly 4/5  LLE ROM: WFL  LLE Strength: grossly 4/5    Functional Mobility:  Bed Mobility:  Rolling Left:  contact guard assistance  Scooting: contact guard assistance  Supine to Sit: moderate assistance  Transfers:  Sit to Stand:  contact guard assistance with rolling walker  Gait: 6 steps fwd RW CGA  Pt demonstrated decreased step length, marina, and heel strike and toe off during trial. Pt with no LOB or SOB reported, but patient reports fatigue after trial.  All lines remained intact during  trial.   Balance:   Static sitting balance- SBA  Dynamic sitting balance- CGA   Pt demonstrated posterior lean during with dynamic balance requiring CGA to correct.  Static standing balance- CGA      AM-PAC 6 CLICK MOBILITY  Total Score:16       Treatment & Education:  Pt and spouse educated on PT role and POC; both verbalized understanding.    Patient left up in chair with all lines intact, call button in reach, RN notified, and spouse present.    GOALS:   Multidisciplinary Problems       Physical Therapy Goals          Problem: Physical Therapy    Goal Priority Disciplines Outcome Goal Variances Interventions   Physical Therapy Goal     PT, PT/OT Ongoing, Progressing     Description: Goals to be met by: 23     Patient will increase functional independence with mobility by performin. Supine to sit with [place]  2. Sit to stand transfer with Supervision  3. Bed to chair transfer with Supervision using LRAD  4. Gait  x 75 feet with Stand-by Assistance using LRAD  5. Ascend/Descend 6 inch curb step with Stand-by Assistance with/without LRAD  6. Lower extremity exercise program x30 reps per handout, with independence                         History:     Past Medical History:   Diagnosis Date    Anticoagulant long-term use     Asthma     Back pain     Bradycardia, unspecified 2019    The etiology of the bradycardic episode is unclear.  The have appear to be respiratory in origin (at least the 1st episode).  We will continue to monitor carefully.  We are awaiting evaluation by Cardiology.      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 CATH LAB;  Service: Cardiology;  Laterality: N/A;    ANGIOGRAM, CORONARY, WITH LEFT HEART CATHETERIZATION N/A 2023    Procedure: Angiogram, Coronary, with Left Heart Cath;  Surgeon: Wilman Kim MD;  Location: [facility] CATH LAB;  Service: Cardiology;  Laterality: N/A;    ANTEGRADE NEPHROSTOGRAPHY Left 2019    Procedure: Nephrostogram - antegrade;  Surgeon: Robin Boyd MD;  [facility];  Service: Urology;  Laterality: Left;    BRONCHOSCOPY N/A 2019    Procedure: BRONCHOSCOPY;  Surgeon: Sean Ruano MD;  [address]  Service: General;  Laterality: N/A;    CARDIAC CATHETERIZATION      CATARACT EXTRACTION      cataract extraction left eye      cataracts      CAUDAL EPIDURAL STEROID INJECTION N/A 2019    Procedure: Injection-steroid-epidural-caudal;  Surgeon: Dave Bentley Jr., MD;  Location: [facility] PAIN MGT;  Service: Pain Management;  Laterality: N/A;     SECTION, LOW TRANSVERSE      COLONOSCOPY N/A 2019    Procedure: COLONOSCOPY;  Surgeon: Al Alaniz MD;  Location: [facility] ENDO (2ND FLR);  Service: Endoscopy;  Laterality: N/A;    CORONARY ANGIOPLASTY      CYSTOGRAM N/A 2019    Procedure: CYSTOGRAM INTRAOP;  Surgeon: Robin Boyd MD;  [facility];  Service: Urology;  Laterality: N/A;  1 HOUR    CYSTOSCOPY W/ RETROGRADES Left 2019    Procedure: CYSTOSCOPY, WITH RETROGRADE PYELOGRAM;  Surgeon: Robin Boyd  MD;  [address]  Service: Urology;  Laterality: Left;  fluro    ENDOSCOPIC ULTRASOUND OF UPPER GASTROINTESTINAL TRACT N/A 6/15/2023    Procedure: ULTRASOUND, UPPER GI TRACT, ENDOSCOPIC;  Surgeon: Lisa Kim MD;  Location: [facility] ENDO (2ND FLR);  Service: Endoscopy;  Laterality: N/A;    ERCP N/A 6/15/2023    Procedure: ERCP (ENDOSCOPIC RETROGRADE CHOLANGIOPANCREATOGRAPHY);  Surgeon: Lisa Kim MD;  Location: [facility] ENDO (2ND FLR);  Service: Endoscopy;  Laterality: N/A;    ESOPHAGOGASTRODUODENOSCOPY W/ PEG  5/2/2019    Procedure: EGD, WITH PEG TUBE INSERTION;  Surgeon: Sean Ruano MD;  [address]  Service: General;;    EXCISION TURBINATE, SUBMUCOUS      FLEXIBLE SIGMOIDOSCOPY N/A 5/13/2019    Procedure: SIGMOIDOSCOPY, FLEXIBLE;  Surgeon: ALBERTO Amin MD;  Location: [facility] (2ND FLR);  Service: Endoscopy;  Laterality: N/A;    FLEXIBLE SIGMOIDOSCOPY N/A 5/21/2019    Procedure: SIGMOIDOSCOPY, FLEXIBLE;  Surgeon: ALBERTO Amin MD;  [facility]  Service: Endoscopy;  Laterality: N/A;    FUSION OF LUMBAR SPINE BY ANTERIOR APPROACH Left 4/12/2019    Procedure: FUSION, SPINE, LUMBAR, ANTERIOR APPROACH Left L5-S1 Anterior to Psoa Interbody Fusion, L5-S1 Posterior Instrumentation;  Surgeon: Mk George MD;  Location: [address];  Service: Neurosurgery;  Laterality: Left;  Porcedure:  Left L5-S1 Anterior to Psoa Interbody Fusion, L5-S1 Posterior Instrumentation  Surgery Time: 4 Hrs  LOS: 2-3  Anesthesia: General   Blood: Type & Screen  R    HAND SURGERY Left     HAND SURGERY Right     torn ligament repair/ Dr. Yeboah    HYSTERECTOMY      INJECTION OF STEROID Right 12/10/2020    Procedure: INJECTION, STEROID Right SI Joint Block and Steroid Injection;  Surgeon: Mk George MD;  Location: [facility];  Service: Neurosurgery;  Laterality: Right;  Procedure: Right SI Joint Block and Steroid Injection   SUrgery Time: 30 Min  LOS: 0  Anesthesia:  MAC  Radiology: C-arm  Bed: [place] 4 Poster  Position: Prone    INJECTION OF STEROID Right 9/28/2021    Procedure: INJECTION, STEROID Right SI joint block & steroid injection;  Surgeon: Mk George MD;  Location: [facility];  Service: Neurosurgery;  Laterality: Right;  Procedure: Right SI joint block & steroid injection  Surgery Time: 30m  Anesthesia: Local MAC  Radiology: C-arm  Bed: Regular  Position: Prone    IVUS, CORONARY  8/14/2023    Procedure: IVUS, Coronary;  Surgeon: Wilman Kim MD;  Location: [facility] CATH LAB;  Service: Cardiology;;    LAPAROSCOPIC CHOLECYSTECTOMY N/A 6/23/2023    Procedure: CHOLECYSTECTOMY, LAPAROSCOPIC;  Surgeon: Richard Penny MD;  Location: [address];  Service: General;  Laterality: N/A;    left foot surgery      left wrist surgery      LYSIS OF ADHESIONS N/A 2/19/2020    Procedure: LYSIS, ADHESIONS;  Surgeon: Robin Boyd MD;  Location: [address];  Service: Urology;  Laterality: N/A;    NASAL SEPTUM SURGERY  5/7/15    OPEN REDUCTION AND INTERNAL FIXATION (ORIF) OF FRACTURE OF PROXIMAL HUMERUS Left 7/12/2023    Procedure: ORIF, FRACTURE, HUMERUS, PROXIMAL ;  Surgeon: Tomasz Leary MD;  Location: [address];  Service: Orthopedics;  Laterality: Left;    PERCUTANEOUS NEPHROSTOMY Left 4/21/2019    Procedure: Creation, Nephrostomy, Percutaneous;  Surgeon: Karina Surgeon;  Location: [facility];  Service: Anesthesiology;  Laterality: Left;    REPAIR OF URETER  4/12/2019    Procedure: REPAIR, URETER;  Surgeon: Mk George MD;  Location: [address];  Service: Neurosurgery;;    REPLACEMENT OF NEPHROSTOMY TUBE N/A 7/18/2019    Procedure: REPLACEMENT, NEPHROSTOMY TUBE;  Surgeon: [facility] Diagnostic Provider;  Location: [address];  Service: Anesthesiology;  Laterality: N/A;  188    REPLACEMENT OF NEPHROSTOMY TUBE N/A 7/24/2019    Procedure: REPLACEMENT, NEPHROSTOMY TUBE;  Surgeon: Leo Diagnostic Provider;  Location: [address];  Service:  Anesthesiology;  Laterality: N/A;  188    REPLACEMENT OF NEPHROSTOMY TUBE N/A 10/7/2019    Procedure: REPLACEMENT, NEPHROSTOMY TUBE;  Surgeon: [facility] Diagnostic Provider;  [facility];  Service: Anesthesiology;  Laterality: N/A;  189    REPLACEMENT OF NEPHROSTOMY TUBE N/A 11/25/2019    Procedure: REPLACEMENT, NEPHROSTOMY TUBE;  Surgeon: [facility] Diagnostic Provider;  [facility];  Service: Anesthesiology;  Laterality: N/A;  Room 188/Bessy    REPLACEMENT OF NEPHROSTOMY TUBE Right 2/19/2020    Procedure: REPLACEMENT, NEPHROSTOMY TUBE removal removal;  Surgeon: Robin Boyd MD;  [address]  Service: Urology;  Laterality: Right;    RIGHT HEART CATHETERIZATION Right 8/3/2023    Procedure: INSERTION, CATHETER, RIGHT HEART;  Surgeon: Aime George MD;  Location: [facility] CATH LAB;  Service: Cardiology;  Laterality: Right;    rt elbow surgery      S/P LAD COATED STENT  05/14/2010    6 total     S/P OM1 STENT  08/2003    SINUS SURGERY      F.E.S.S.    STENT, DRUG ELUTING, SINGLE VESSEL, CORONARY  8/14/2023    Procedure: Stent, Drug Eluting, Single Vessel, Coronary;  Surgeon: Wilman Kim MD;  Location: [facility] CATH LAB;  Service: Cardiology;;    TRACHEOSTOMY N/A 5/2/2019    Procedure: CREATION, TRACHEOSTOMY;  Surgeon: Sean Ruano MD;  Location: [address];  Service: General;  Laterality: N/A;    TUBAL LIGATION      URETERAL REIMPLANTION Left 2/19/2020    Procedure: REIMPLANTATION, URETER WITH PSOAS HITCH;  Surgeon: Robin Boyd MD;  Location: [address];  Service: Urology;  Laterality: Left;    VENTRICULOGRAM, LEFT  8/3/2023    Procedure: Ventriculogram, Left;  Surgeon: Aime George MD;  Location: [facility] CATH LAB;  Service: Cardiology;;       Time Tracking:     PT Received On: 08/15/23  PT Start Time: 1000     PT Stop Time: 1017  PT Total Time (min): 17 min     Billable Minutes: Evaluation 9 minutes and Gait Training 8 minutes      08/15/2023

## 2023-08-15 NOTE — PROVIDER PROGRESS NOTES - EMERGENCY DEPT.
Encounter Date: 8/14/2023    ED Physician Progress Notes         EKG - STEMI Decision  Initial Reading: STEMI present.  Response: Code STEMI called.

## 2023-08-15 NOTE — PLAN OF CARE
Patient presented to the ED with nausea, vomiting, and chest pain. EKG in the ED showed posterior changes consistent with STEMI. Patient was taken to cath lab and her symptoms resolved after PCI. Patient reports consistent nausea and vomiting. Abdominal US to evaluate for possible source of nausea and vomiting. F/u with Amylase and Lipase labs as well. LFT's wnl upon admission. Consider consult to hepatology if any pertinent findings or symptoms worsen.

## 2023-08-15 NOTE — ED PROVIDER NOTES
Encounter Date: 8/14/2023       History     Chief Complaint   Patient presents with    Loss of Consciousness     Pt found on floor at home by .  states she was on the floor for atleast a couple of hours. No obvious head trauma. Pt endorses generalized weakness and vomiting. Pt seen Saturday for dehydration.      HPI    63-year-old female past medical history CAD, multiple stents, CHF, DVT presenting with syncope.  Patient had a recent admission for 99% occlusion of the circumflex that was medically managed.  She arrives with  today, he reportedly found her unconscious on the ground just after 4:00 p.m..  He had to help her up, and then brought her in for evaluation.  In the ER she is complaining of substernal chest pain and weakness.  EKG from triage was read as STEMI, and patient immediately brought back to main ED room.  Symptoms are moderate, worsening, nothing makes it better.  Code STEMI was called.      Review of patient's allergies indicates:   Allergen Reactions    Milk containing products (dairy)      Causes GI distress     Past Medical History:   Diagnosis Date    Anticoagulant long-term use     Asthma     Back pain     Bradycardia, unspecified 05/08/2019    The etiology of the bradycardic episode is unclear.  The have appear to be respiratory in origin (at least the 1st episode).  We will continue to monitor carefully.  We are awaiting evaluation by Cardiology.      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, with long-term current use of insulin 2020     Past Surgical History:   Procedure Laterality Date    ANGIOGRAM, CORONARY, WITH LEFT HEART CATHETERIZATION N/A 8/3/2023    Procedure: Angiogram, Coronary, with Left Heart Cath;  Surgeon: Aime George MD;  Location: [facility] CATH LAB;  Service: Cardiology;  Laterality: N/A;    ANTEGRADE NEPHROSTOGRAPHY Left 2019    Procedure: Nephrostogram - antegrade;  Surgeon: Robin Boyd MD;  [facility];  Service: Urology;  Laterality: Left;    BRONCHOSCOPY N/A 2019    Procedure: BRONCHOSCOPY;  Surgeon: Sean Ruano MD;  Location: [address];  Service: General;  Laterality: N/A;    CARDIAC CATHETERIZATION      CATARACT EXTRACTION      cataract extraction left eye      cataracts      CAUDAL EPIDURAL STEROID INJECTION N/A 2019    Procedure: Injection-steroid-epidural-caudal;  Surgeon: Dave Bentley Jr., MD;  Location: [facility] PAIN MGT;  Service: Pain Management;  Laterality: N/A;     SECTION, LOW TRANSVERSE      COLONOSCOPY N/A 2019    Procedure: COLONOSCOPY;  Surgeon: Al Alaniz MD;  Location: [facility] ([address] [place] [address]);  Service: Endoscopy;  Laterality: N/A;    CORONARY ANGIOPLASTY      CYSTOGRAM N/A 2019    Procedure: CYSTOGRAM INTRAOP;  Surgeon: Robin Boyd MD;  [facility];  Service: Urology;  Laterality: N/A;  1 HOUR    CYSTOSCOPY W/ RETROGRADES Left 2019    Procedure: CYSTOSCOPY, WITH RETROGRADE PYELOGRAM;  Surgeon: Robin Boyd MD;  [facility];  Service: Urology;  Laterality: Left;  fluro    ENDOSCOPIC ULTRASOUND OF UPPER GASTROINTESTINAL TRACT N/A 6/15/2023    Procedure: ULTRASOUND, UPPER GI TRACT, ENDOSCOPIC;  Surgeon: Lisa Kim MD;  [facility]  Service: Endoscopy;  Laterality: N/A;    ERCP N/A 6/15/2023    Procedure: ERCP  (ENDOSCOPIC RETROGRADE CHOLANGIOPANCREATOGRAPHY);  Surgeon: Lisa Kim MD;  Location: [facility] ENDO (2ND FLR);  Service: Endoscopy;  Laterality: N/A;    ESOPHAGOGASTRODUODENOSCOPY W/ PEG  5/2/2019    Procedure: EGD, WITH PEG TUBE INSERTION;  Surgeon: Sean Ruano MD;  Location: [facility] OR 2ND FLR;  Service: General;;    EXCISION TURBINATE, SUBMUCOUS      FLEXIBLE SIGMOIDOSCOPY N/A 5/13/2019    Procedure: SIGMOIDOSCOPY, FLEXIBLE;  Surgeon: ALBERTO Amin MD;  Location: [facility] ENDO (2ND FLR);  Service: Endoscopy;  Laterality: N/A;    FLEXIBLE SIGMOIDOSCOPY N/A 5/21/2019    Procedure: SIGMOIDOSCOPY, FLEXIBLE;  Surgeon: ALBERTO Amin MD;  Location: [facility] ENDO (2ND FLR);  Service: Endoscopy;  Laterality: N/A;    FUSION OF LUMBAR SPINE BY ANTERIOR APPROACH Left 4/12/2019    Procedure: FUSION, SPINE, LUMBAR, ANTERIOR APPROACH Left L5-S1 Anterior to Psoa Interbody Fusion, L5-S1 Posterior Instrumentation;  Surgeon: Mk George MD;  [address]  Service: Neurosurgery;  Laterality: Left;  Porcedure:  Left L5-S1 Anterior to Psoa Interbody Fusion, L5-S1 Posterior Instrumentation  Surgery Time: 4 Hrs  LOS: 2-3  Anesthesia: General   Blood: Type & Screen  R    HAND SURGERY Left     HAND SURGERY Right     torn ligament repair/ Dr. Yeboah    HYSTERECTOMY      INJECTION OF STEROID Right 12/10/2020    Procedure: INJECTION, STEROID Right SI Joint Block and Steroid Injection;  Surgeon: Mk George MD;  Location: [facility] OR;  Service: Neurosurgery;  Laterality: Right;  Procedure: Right SI Joint Block and Steroid Injection   SUrgery Time: 30 Min  LOS: 0  Anesthesia: MAC  Radiology: C-arm  Bed: [place] [id number] Poster  Position: Prone    INJECTION OF STEROID Right 9/28/2021    Procedure: INJECTION, STEROID Right SI joint block & steroid injection;  Surgeon: Mk George MD;  Location: [facility];  Service: Neurosurgery;  Laterality: Right;  Procedure: Right SI joint block & steroid injection  Surgery Time:  30m  Anesthesia: Local MAC  Radiology: C-arm  Bed: Regular  Position: Prone    LAPAROSCOPIC CHOLECYSTECTOMY N/A 6/23/2023    Procedure: CHOLECYSTECTOMY, LAPAROSCOPIC;  Surgeon: Richard Penny MD;  [address];  Service: General;  Laterality: N/A;    left foot surgery      left wrist surgery      LYSIS OF ADHESIONS N/A 2/19/2020    Procedure: LYSIS, ADHESIONS;  Surgeon: Robin Boyd MD;  Location: [address];  Service: Urology;  Laterality: N/A;    NASAL SEPTUM SURGERY  5/7/15    OPEN REDUCTION AND INTERNAL FIXATION (ORIF) OF FRACTURE OF PROXIMAL HUMERUS Left 7/12/2023    Procedure: ORIF, FRACTURE, HUMERUS, PROXIMAL ;  Surgeon: Tomasz Leary MD;  Location: [address];  Service: Orthopedics;  Laterality: Left;    PERCUTANEOUS NEPHROSTOMY Left 4/21/2019    Procedure: Creation, Nephrostomy, Percutaneous;  Surgeon: Karina Surgeon;  Location: [facility];  Service: Anesthesiology;  Laterality: Left;    REPAIR OF URETER  4/12/2019    Procedure: REPAIR, URETER;  Surgeon: Mk George MD;  Location: [address];  Service: Neurosurgery;;    REPLACEMENT OF NEPHROSTOMY TUBE N/A 7/18/2019    Procedure: REPLACEMENT, NEPHROSTOMY TUBE;  Surgeon: [facility] Diagnostic Provider;  [address];  Service: Anesthesiology;  Laterality: N/A;  188    REPLACEMENT OF NEPHROSTOMY TUBE N/A 7/24/2019    Procedure: REPLACEMENT, NEPHROSTOMY TUBE;  Surgeon: Dos Diagnostic Provider;  [address];  Service: Anesthesiology;  Laterality: N/A;  188    REPLACEMENT OF NEPHROSTOMY TUBE N/A 10/7/2019    Procedure: REPLACEMENT, NEPHROSTOMY TUBE;  Surgeon: Dos Diagnostic Provider;  [address];  Service: Anesthesiology;  Laterality: N/A;  189    REPLACEMENT OF NEPHROSTOMY TUBE N/A 11/25/2019    Procedure: REPLACEMENT, NEPHROSTOMY TUBE;  Surgeon: [facility] Diagnostic Provider;  [address];  Service: Anesthesiology;  Laterality: N/A;  Room 188/Bessy    REPLACEMENT OF  NEPHROSTOMY TUBE Right 2/19/2020    Procedure: REPLACEMENT, NEPHROSTOMY TUBE removal removal;  Surgeon: Robin Boyd MD;  Location: [facility] OR [address] [place] [address];  Service: Urology;  Laterality: Right;    RIGHT HEART CATHETERIZATION Right 8/3/2023    Procedure: INSERTION, CATHETER, RIGHT HEART;  Surgeon: Aime George MD;  Location: [facility] CATH LAB;  Service: Cardiology;  Laterality: Right;    rt elbow surgery      S/P LAD COATED STENT  05/14/2010    6 total     S/P OM1 STENT  08/2003    SINUS SURGERY      F.E.S.S.    TRACHEOSTOMY N/A 5/2/2019    Procedure: CREATION, TRACHEOSTOMY;  Surgeon: Sean Ruano MD;  Location: [facility] OR [address] [place] [address];  Service: General;  Laterality: N/A;    TUBAL LIGATION      URETERAL REIMPLANTION Left 2/19/2020    Procedure: REIMPLANTATION, URETER WITH PSOAS HITCH;  Surgeon: Robin Boyd MD;  Location: [facility] OR [address] [place] [address];  Service: Urology;  Laterality: Left;    VENTRICULOGRAM, LEFT  8/3/2023    Procedure: Ventriculogram, Left;  Surgeon: Aime George MD;  Location: [facility] CATH LAB;  Service: Cardiology;;     Family History   Problem Relation Age of Onset    Diabetes Mother     Heart disease Mother     Diabetes Father     Leukemia Father         leukemia    Heart attack Father     Diabetes Sister     Diabetes Brother     Diabetes Sister     No Known Problems Sister     No Known Problems Brother     No Known Problems Brother     No Known Problems Maternal Grandmother     No Known Problems Maternal Grandfather     No Known Problems Paternal Grandmother     No Known Problems Paternal Grandfather     No Known Problems Son     No Known Problems Son     No Known Problems Maternal Aunt     No Known Problems Maternal Uncle     No Known Problems Paternal Aunt     No Known Problems Paternal Uncle     Colon cancer Neg Hx     Inflammatory bowel disease Neg Hx     Melanoma Neg Hx     Psoriasis Neg Hx     Lupus Neg Hx     Eczema Neg Hx     Acne Neg Hx     Amblyopia Neg Hx     Blindness Neg Hx     Cancer Neg Hx      Cataracts Neg Hx     Glaucoma Neg Hx     Hypertension Neg Hx     Macular degeneration Neg Hx     Retinal detachment Neg Hx     Strabismus Neg Hx     Stroke Neg Hx     Thyroid disease Neg Hx     Heart failure Neg Hx     Hyperlipidemia Neg Hx      Social History     Tobacco Use    Smoking status: Never    Smokeless tobacco: Never   Substance Use Topics    Alcohol use: No     Alcohol/week: 0.0 standard drinks of alcohol    Drug use: No     Review of Systems   Constitutional:  Negative for fever.   HENT:  Negative for sore throat.    Respiratory:  Positive for shortness of breath.    Cardiovascular:  Positive for chest pain.   Gastrointestinal:  Negative for abdominal pain.   Genitourinary:  Negative for dysuria.   Musculoskeletal:  Negative for back pain.   Skin:  Negative for rash.   Neurological:  Positive for syncope and weakness.   Hematological:  Does not bruise/bleed easily.       Physical Exam     Initial Vitals [08/14/23 1907]   BP Pulse Resp Temp SpO2   106/68 96 16 98.1 °F (36.7 °C) 100 %      MAP       --         Physical Exam    Nursing note and vitals reviewed.  Constitutional: She appears well-developed. She appears distressed.   HENT:   Head: Normocephalic and atraumatic.   Eyes: Conjunctivae and EOM are normal.   Neck: Neck supple.   Normal range of motion.  Cardiovascular:  Normal rate and regular rhythm.           Pulmonary/Chest: Breath sounds normal. No respiratory distress.   Abdominal: Abdomen is soft. She exhibits no distension.   Musculoskeletal:         General: No edema. Normal range of motion.      Cervical back: Normal range of motion and neck supple.     Neurological: She is alert and oriented to person, place, and time.   Skin: Skin is warm and dry.   Psychiatric: She has a normal mood and affect. Thought content normal.         ED Course   Procedures  Labs Reviewed   CBC W/ AUTO DIFFERENTIAL - Abnormal; Notable for the following components:       Result Value    WBC 13.82 (*)      Hemoglobin 10.9 (*)     Hematocrit 35.9 (*)     MCH 25.3 (*)     MCHC 30.4 (*)     RDW 16.1 (*)     Gran # (ANC) 11.0 (*)     Immature Grans (Abs) 0.06 (*)     Gran % 79.9 (*)     Lymph % 12.0 (*)     All other components within normal limits   COMPREHENSIVE METABOLIC PANEL - Abnormal; Notable for the following components:    Potassium 3.4 (*)     Chloride 89 (*)     Glucose 245 (*)     BUN 24 (*)     Total Protein 9.4 (*)     Albumin 2.8 (*)     Total Bilirubin 1.1 (*)     Alkaline Phosphatase 785 (*)     eGFR 51.2 (*)     Anion Gap 22 (*)     All other components within normal limits   TROPONIN I - Abnormal; Notable for the following components:    Troponin I 1.017 (*)     All other components within normal limits   URINALYSIS, REFLEX TO URINE CULTURE   TYPE & SCREEN   POCT GLUCOSE MONITORING CONTINUOUS          Imaging Results    None          Medications   sodium chloride 0.9% flush 10 mL (has no administration in time range)   aspirin tablet 325 mg (325 mg Oral Given 8/14/23 1928)   ticagrelor tablet 180 mg (180 mg Oral Given 8/14/23 1928)   ondansetron injection 4 mg (4 mg Intravenous Given 8/14/23 2014)                       7:07PM:  -rate 93   Normal sinus rhythm   ST depressions in V2 and V3  ST elevations posteriorly  New compared to prior  +STEMI EKG, called from triage         Clinical Impression:   Final diagnoses:  [R55] Syncope  [R07.9] Chest pain  [I21.29] Acute ST elevation myocardial infarction (STEMI) of posterior wall (Primary)       63-year-old female presenting with syncope and chest pain.  Vital signs stable in the emergency room, code STEMI was called from triage. STEMI labs activated, overhead paged.  Case was discussed with cards fellow at 7:30PM, they will come see the patient.  Patient was given full-dose aspirin, and brillinta.  Labs with +trop of 1.017.  Cards fellow at the bedside, they are taking her to the cath lab. Patient to cath lab with fellow and Mk Hardy,  MD  08/14/23 2022    ADDENDUM:    Critical care time not including procedures 31 minutes.        Mk Mckinney MD  08/31/23 [id number]

## 2023-08-15 NOTE — HPI
Mariann Huff is a 63 y.o. female with CAD (s/p GINGER to ostial LAD in 2014 and [facility] 8/14/23 w/ ramus intermedius thombectomy and GINGER w/ noted mild in-stent-restenosis of previous LAD stent), HFpEF, HTN, HLD, DM2, MURTAZA who presented on 8/14 with near-syncope, decreased PO intake, nausea, and vomiting. Her ECG showed the posterior changes for which posterior EKG was obtained with showed STEMI. Bedside echo showed an EF 45-50% and norma-lateral and infero-lateral hypokinesis. She was taken to cath lab and her symptoms resolved after PCI.    Her course has been complicated by multiple episodes of acute cholangitis w/ multiple biliary stents placed and subsequently obstructed with the most recent ERCP on 8/21 w/ placement if new biliary stent. The biliary obstruction is likely secondary to metastatic pancreatic mass which was biopsied 8/21 w/ poorly differentiated carcinoma. On 8/22 she decompensated requiring norepinephrine. Her troponin was elevated to 6 -> 8. ECG with sinus rhythm w/ unchanged T-wave inversions in leads 1, aVL. Cardiology consulted for evaluation of NSTEMI.    Previous cardiac history:  [facility] 8/14/23    Successful primary PCI of ramus intermedius and lateral branch with Pronto thrombectomy and 3X16 GINGER    IVUS utilized for stent sizing    Mild ISR of LAD stents    The pre-procedure left ventricular end diastolic pressure was 12.    [facility] 8/3/23 (For NSTEMI with Trop up to 29)    The Ramus lesion was 70% stenosed.    The Ost Cx to Prox Cx lesion was 99% stenosed.    The ejection fraction was calculated to be 45%.    The pre-procedure left ventricular end diastolic pressure was 15.    The estimated blood loss was none.    Echo 8/3/23    Left Ventricle: The left ventricle is normal in size. Ventricular mass is normal. Normal wall thickness. regional wall motion abnormalities present. There is mildly reduced systolic function with a visually estimated ejection fraction of 40 - 45%.    Right Ventricle: Normal  right ventricular cavity size. Systolic function is normal.    Mitral Valve: There is mild regurgitation with a centrally directed jet.    IVC/SVC: Normal venous pressure at 3 mmHg.    No LV thrombus seen, wall motion abnormalities appear to be in LCx distribution.    Echo 8/15/23    Left Ventricle: The left ventricle is normal in size. Ventricular mass is normal. Normal wall thickness. regional wall motion abnormalities present. See diagram for wall motion findings. There is moderately reduced systolic function. Ejection fraction by visual approximation is 40%. Grade II diastolic dysfunction.    Left Atrium: Left atrium is mildly dilated. The left atrium volume index is 35.5 mL/m2.    Right Ventricle: Normal right ventricular cavity size. Wall thickness is normal. Right ventricle wall motion  is normal. Systolic function is normal.    Mitral Valve: There is mild regurgitation.    IVC/SVC: Normal venous pressure at 3 mmHg.

## 2023-08-15 NOTE — PLAN OF CARE
PT evaluation completed- see note for details. PT POC and goals established.    Problem: Physical Therapy  Goal: Physical Therapy Goal  Description: Goals to be met by: 23     Patient will increase functional independence with mobility by performin. Supine to sit with [place]  2. Sit to stand transfer with Supervision  3. Bed to chair transfer with Supervision using LRAD  4. Gait  x 75 feet with Stand-by Assistance using LRAD  5. Ascend/Descend 6 inch curb step with Stand-by Assistance with/without LRAD  6. Lower extremity exercise program x30 reps per handout, with independence    Outcome: Ongoing, Progressing

## 2023-08-16 ENCOUNTER — ANESTHESIA EVENT (OUTPATIENT)
Dept: ENDOSCOPY | Facility: HOSPITAL | Age: 63
DRG: 246 | End: 2023-08-16
Payer: COMMERCIAL

## 2023-08-16 PROBLEM — R10.9 ABDOMINAL PAIN: Status: ACTIVE | Noted: 2023-08-16

## 2023-08-16 PROBLEM — E11.9 TYPE 2 DIABETES MELLITUS: Status: ACTIVE | Noted: 2023-06-09

## 2023-08-16 PROBLEM — I21.3 STEMI (ST ELEVATION MYOCARDIAL INFARCTION): Status: ACTIVE | Noted: 2023-08-16

## 2023-08-16 PROBLEM — I50.9 CHF (CONGESTIVE HEART FAILURE): Status: ACTIVE | Noted: 2023-08-16

## 2023-08-16 LAB
ALBUMIN SERPL BCP-MCNC: 2.3 G/DL (ref 3.5–5.2)
ALBUMIN SERPL BCP-MCNC: 2.5 G/DL (ref 3.5–5.2)
ALP SERPL-CCNC: 1094 U/L (ref 55–135)
ALP SERPL-CCNC: 987 U/L (ref 55–135)
ALT SERPL W/O P-5'-P-CCNC: 50 U/L (ref 10–44)
ALT SERPL W/O P-5'-P-CCNC: 57 U/L (ref 10–44)
AMYLASE SERPL-CCNC: 107 U/L (ref 20–110)
ANION GAP SERPL CALC-SCNC: 12 MMOL/L (ref 8–16)
ANION GAP SERPL CALC-SCNC: 17 MMOL/L (ref 8–16)
APTT PPP: 32.5 SEC (ref 21–32)
AST SERPL-CCNC: 202 U/L (ref 10–40)
AST SERPL-CCNC: 225 U/L (ref 10–40)
BASOPHILS # BLD AUTO: 0.05 K/UL (ref 0–0.2)
BASOPHILS NFR BLD: 0.4 % (ref 0–1.9)
BILIRUB DIRECT SERPL-MCNC: 1.3 MG/DL (ref 0.1–0.3)
BILIRUB SERPL-MCNC: 2 MG/DL (ref 0.1–1)
BILIRUB SERPL-MCNC: 2.2 MG/DL (ref 0.1–1)
BNP SERPL-MCNC: 706 PG/ML (ref 0–99)
BUN SERPL-MCNC: 14 MG/DL (ref 8–23)
BUN SERPL-MCNC: 18 MG/DL (ref 8–23)
CALCIUM SERPL-MCNC: 8.9 MG/DL (ref 8.7–10.5)
CALCIUM SERPL-MCNC: 9.5 MG/DL (ref 8.7–10.5)
CHLORIDE SERPL-SCNC: 101 MMOL/L (ref 95–110)
CHLORIDE SERPL-SCNC: 97 MMOL/L (ref 95–110)
CO2 SERPL-SCNC: 27 MMOL/L (ref 23–29)
CO2 SERPL-SCNC: 29 MMOL/L (ref 23–29)
CREAT SERPL-MCNC: 0.8 MG/DL (ref 0.5–1.4)
CREAT SERPL-MCNC: 0.9 MG/DL (ref 0.5–1.4)
DIFFERENTIAL METHOD: ABNORMAL
EOSINOPHIL # BLD AUTO: 0.4 K/UL (ref 0–0.5)
EOSINOPHIL NFR BLD: 3.1 % (ref 0–8)
ERYTHROCYTE [DISTWIDTH] IN BLOOD BY AUTOMATED COUNT: 16.6 % (ref 11.5–14.5)
EST. GFR  (NO RACE VARIABLE): >60 ML/MIN/1.73 M^2
EST. GFR  (NO RACE VARIABLE): >60 ML/MIN/1.73 M^2
GLUCOSE SERPL-MCNC: 172 MG/DL (ref 70–110)
GLUCOSE SERPL-MCNC: 203 MG/DL (ref 70–110)
HCT VFR BLD AUTO: 36.3 % (ref 37–48.5)
HGB BLD-MCNC: 10.3 G/DL (ref 12–16)
IMM GRANULOCYTES # BLD AUTO: 0.06 K/UL (ref 0–0.04)
IMM GRANULOCYTES NFR BLD AUTO: 0.5 % (ref 0–0.5)
INR PPP: 1.2 (ref 0.8–1.2)
LIPASE SERPL-CCNC: 61 U/L (ref 4–60)
LYMPHOCYTES # BLD AUTO: 1.6 K/UL (ref 1–4.8)
LYMPHOCYTES NFR BLD: 13.1 % (ref 18–48)
MAGNESIUM SERPL-MCNC: 2.8 MG/DL (ref 1.6–2.6)
MCH RBC QN AUTO: 24.6 PG (ref 27–31)
MCHC RBC AUTO-ENTMCNC: 28.4 G/DL (ref 32–36)
MCV RBC AUTO: 87 FL (ref 82–98)
MONOCYTES # BLD AUTO: 0.7 K/UL (ref 0.3–1)
MONOCYTES NFR BLD: 6 % (ref 4–15)
NEUTROPHILS # BLD AUTO: 9.2 K/UL (ref 1.8–7.7)
NEUTROPHILS NFR BLD: 76.9 % (ref 38–73)
NRBC BLD-RTO: 0 /100 WBC
PLATELET # BLD AUTO: 313 K/UL (ref 150–450)
PMV BLD AUTO: 12.6 FL (ref 9.2–12.9)
POCT GLUCOSE: 201 MG/DL (ref 70–110)
POCT GLUCOSE: 202 MG/DL (ref 70–110)
POCT GLUCOSE: 225 MG/DL (ref 70–110)
POCT GLUCOSE: 233 MG/DL (ref 70–110)
POTASSIUM SERPL-SCNC: 4 MMOL/L (ref 3.5–5.1)
POTASSIUM SERPL-SCNC: 5.5 MMOL/L (ref 3.5–5.1)
PROT SERPL-MCNC: 7.8 G/DL (ref 6–8.4)
PROT SERPL-MCNC: 8.7 G/DL (ref 6–8.4)
PROTHROMBIN TIME: 12.3 SEC (ref 9–12.5)
RBC # BLD AUTO: 4.19 M/UL (ref 4–5.4)
SODIUM SERPL-SCNC: 141 MMOL/L (ref 136–145)
SODIUM SERPL-SCNC: 142 MMOL/L (ref 136–145)
WBC # BLD AUTO: 11.91 K/UL (ref 3.9–12.7)

## 2023-08-16 PROCEDURE — 94660 CPAP INITIATION&MGMT: CPT

## 2023-08-16 PROCEDURE — 83880 ASSAY OF NATRIURETIC PEPTIDE: CPT

## 2023-08-16 PROCEDURE — 99232 SBSQ HOSP IP/OBS MODERATE 35: CPT | Mod: ,,, | Performed by: NURSE PRACTITIONER

## 2023-08-16 PROCEDURE — 63600175 PHARM REV CODE 636 W HCPCS: Performed by: INTERNAL MEDICINE

## 2023-08-16 PROCEDURE — 82150 ASSAY OF AMYLASE: CPT

## 2023-08-16 PROCEDURE — 80048 BASIC METABOLIC PNL TOTAL CA: CPT | Mod: XB

## 2023-08-16 PROCEDURE — 99232 PR SUBSEQUENT HOSPITAL CARE,LEVL II: ICD-10-PCS | Mod: ,,, | Performed by: NURSE PRACTITIONER

## 2023-08-16 PROCEDURE — 25000003 PHARM REV CODE 250

## 2023-08-16 PROCEDURE — 25000003 PHARM REV CODE 250: Performed by: INTERNAL MEDICINE

## 2023-08-16 PROCEDURE — 63600175 PHARM REV CODE 636 W HCPCS: Performed by: NURSE PRACTITIONER

## 2023-08-16 PROCEDURE — 80053 COMPREHEN METABOLIC PANEL: CPT

## 2023-08-16 PROCEDURE — 99900035 HC TECH TIME PER 15 MIN (STAT)

## 2023-08-16 PROCEDURE — 25000003 PHARM REV CODE 250: Performed by: PHYSICIAN ASSISTANT

## 2023-08-16 PROCEDURE — 63600175 PHARM REV CODE 636 W HCPCS

## 2023-08-16 PROCEDURE — 99233 SBSQ HOSP IP/OBS HIGH 50: CPT | Mod: GC,,, | Performed by: INTERNAL MEDICINE

## 2023-08-16 PROCEDURE — 85730 THROMBOPLASTIN TIME PARTIAL: CPT | Performed by: INTERNAL MEDICINE

## 2023-08-16 PROCEDURE — 36415 COLL VENOUS BLD VENIPUNCTURE: CPT

## 2023-08-16 PROCEDURE — 25000003 PHARM REV CODE 250: Performed by: STUDENT IN AN ORGANIZED HEALTH CARE EDUCATION/TRAINING PROGRAM

## 2023-08-16 PROCEDURE — 80076 HEPATIC FUNCTION PANEL: CPT | Performed by: INTERNAL MEDICINE

## 2023-08-16 PROCEDURE — 20600001 HC STEP DOWN PRIVATE ROOM

## 2023-08-16 PROCEDURE — 83735 ASSAY OF MAGNESIUM: CPT | Performed by: INTERNAL MEDICINE

## 2023-08-16 PROCEDURE — 63600175 PHARM REV CODE 636 W HCPCS: Performed by: STUDENT IN AN ORGANIZED HEALTH CARE EDUCATION/TRAINING PROGRAM

## 2023-08-16 PROCEDURE — 85025 COMPLETE CBC W/AUTO DIFF WBC: CPT | Performed by: INTERNAL MEDICINE

## 2023-08-16 PROCEDURE — 85610 PROTHROMBIN TIME: CPT | Performed by: INTERNAL MEDICINE

## 2023-08-16 PROCEDURE — 94761 N-INVAS EAR/PLS OXIMETRY MLT: CPT

## 2023-08-16 PROCEDURE — 99233 PR SUBSEQUENT HOSPITAL CARE,LEVL III: ICD-10-PCS | Mod: GC,,, | Performed by: INTERNAL MEDICINE

## 2023-08-16 PROCEDURE — 83690 ASSAY OF LIPASE: CPT

## 2023-08-16 RX ORDER — INSULIN ASPART 100 [IU]/ML
0-5 INJECTION, SOLUTION INTRAVENOUS; SUBCUTANEOUS EVERY 6 HOURS PRN
Status: DISCONTINUED | OUTPATIENT
Start: 2023-08-16 | End: 2023-08-16

## 2023-08-16 RX ORDER — INSULIN ASPART 100 [IU]/ML
0-5 INJECTION, SOLUTION INTRAVENOUS; SUBCUTANEOUS EVERY 4 HOURS PRN
Status: DISCONTINUED | OUTPATIENT
Start: 2023-08-16 | End: 2023-08-17

## 2023-08-16 RX ORDER — ONDANSETRON 4 MG/1
4 TABLET, ORALLY DISINTEGRATING ORAL ONCE
Status: COMPLETED | OUTPATIENT
Start: 2023-08-16 | End: 2023-08-16

## 2023-08-16 RX ORDER — ONDANSETRON 4 MG/1
4 TABLET, FILM COATED ORAL EVERY 8 HOURS PRN
Status: DISCONTINUED | OUTPATIENT
Start: 2023-08-16 | End: 2023-08-16

## 2023-08-16 RX ORDER — MORPHINE SULFATE 2 MG/ML
2 INJECTION, SOLUTION INTRAMUSCULAR; INTRAVENOUS ONCE
Status: COMPLETED | OUTPATIENT
Start: 2023-08-16 | End: 2023-08-16

## 2023-08-16 RX ORDER — PROMETHAZINE HYDROCHLORIDE 25 MG/ML
25 INJECTION, SOLUTION INTRAMUSCULAR; INTRAVENOUS EVERY 6 HOURS PRN
Status: DISCONTINUED | OUTPATIENT
Start: 2023-08-16 | End: 2023-08-16

## 2023-08-16 RX ORDER — MORPHINE SULFATE 2 MG/ML
INJECTION, SOLUTION INTRAMUSCULAR; INTRAVENOUS
Status: DISPENSED
Start: 2023-08-16 | End: 2023-08-17

## 2023-08-16 RX ORDER — SODIUM CHLORIDE 9 MG/ML
INJECTION, SOLUTION INTRAVENOUS CONTINUOUS
Status: ACTIVE | OUTPATIENT
Start: 2023-08-16 | End: 2023-08-17

## 2023-08-16 RX ADMIN — SODIUM CHLORIDE: 9 INJECTION, SOLUTION INTRAVENOUS at 11:08

## 2023-08-16 RX ADMIN — INSULIN ASPART 2 UNITS: 100 INJECTION, SOLUTION INTRAVENOUS; SUBCUTANEOUS at 08:08

## 2023-08-16 RX ADMIN — ONDANSETRON 4 MG: 4 TABLET, ORALLY DISINTEGRATING ORAL at 09:08

## 2023-08-16 RX ADMIN — PROMETHAZINE HYDROCHLORIDE 12.5 MG: 25 INJECTION, SOLUTION INTRAMUSCULAR; INTRAVENOUS at 08:08

## 2023-08-16 RX ADMIN — ENOXAPARIN SODIUM 60 MG: 60 INJECTION SUBCUTANEOUS at 11:08

## 2023-08-16 RX ADMIN — INSULIN DETEMIR 8 UNITS: 100 INJECTION, SOLUTION SUBCUTANEOUS at 11:08

## 2023-08-16 RX ADMIN — MORPHINE SULFATE 2 MG: 2 INJECTION, SOLUTION INTRAMUSCULAR; INTRAVENOUS at 07:08

## 2023-08-16 RX ADMIN — ONDANSETRON 4 MG: 2 INJECTION INTRAMUSCULAR; INTRAVENOUS at 07:08

## 2023-08-16 RX ADMIN — ENOXAPARIN SODIUM 60 MG: 60 INJECTION SUBCUTANEOUS at 08:08

## 2023-08-16 RX ADMIN — PROMETHAZINE HYDROCHLORIDE 12.5 MG: 25 INJECTION, SOLUTION INTRAMUSCULAR; INTRAVENOUS at 12:08

## 2023-08-16 NOTE — HPI
Ms. Mariann Huff is a 63 year old female for whom AES is consulted for evaluation of hyperbilirubinemia. She has a PMH significant for CAD (status post PCI in 2014), DVT (on Apixaban), MURTAZA, pancreatitis (diagnosed 05/2023; attributed to TRULICITY usage initially; status post EUS/ERCP in 06/2023 showing gallbladder sludge and stricture in lower third of main duct suspected to be from pancreatitis with plastic stent placed into CBD; due for removal in 08/2023), peripheral artery disease (on PLETAL), and T2DM. She has a PSH significant for subtotal cholecystectomy (late 06/2023).     Hospital Course:   Patient was admitted to [facility] on 08/14 for management of posterior STEMI. She was taken to cath lab on admission and underwent PCI with three GINGER placement and initiated on PLAVIX. On 08/16, patient noted to develop worsening hyperbilirubinemia since admission (2 from 1.1), mildly elevated lipase (61), and transaminitis (, ALT 50's) associated with abdominal pain. CT A/P obtained on 08/16 showed multiple hepatic lesions, biliary stent in place with pneumobilia, evidence of prior cholecystectomy, and a mass measuring 4.7 X 3.3 cm at the duodenal/pancreatic head. AES consulted for further evaluation.     On initial bedside encounter, patient reports sudden onset of bilateral upper abdominal pain on 08/16. She reports symptom association with daily non-bloody emesis since approximately 05/2023 and approximately 30 pounds of unintentional weight loss and eye yellowing since spring of 2023. She denies symptom association with melena, hematochezia, pale colored stools, family history of gastric, colon, and pancreatic cancer, and use of ETOH or cigarettes.

## 2023-08-16 NOTE — ASSESSMENT & PLAN NOTE
-s/p biliary pancreatitis s/p sphincterotomy, biliary stent, cholecystectomy in 06/2023  -Imaging revealed liver lesions concerning for abscess  -was seen by ID last admit on 8/7/23 and started on cefpodoxime 400 po bid by Dr Archibald, continue the same  -would need liver lesions biopsy when feasible to r/o malignancy  -will need hepatology and ID f/up post discharge, refer to their notes from last week

## 2023-08-16 NOTE — SUBJECTIVE & OBJECTIVE
"Interval HPI:   Overnight events: No acute events overnight. Patient in room 348/348 A. Blood glucose stable. BG at and above goal on current insulin regimen (SSI, prandial, and basal insulin ). Steroid use- None. 2 Days Post-Op  Renal function- Normal   Vasopressors-  None     Of note, patient did not receive scheduled Levemir yesterday afternoon; resulting in FBG excursions.   Endocrine will continue to follow and manage insulin orders inpatient.         Diet diabetic 1500 Calorie     Eatin%  Nausea: No  Hypoglycemia and intervention: No  Fever: No  TPN and/or TF: No      /63 (BP Location: Right arm, Patient Position: Lying)   Pulse 83   Temp 97.8 °F (36.6 °C)   Resp 18   Ht 5' 4" (1.626 m)   Wt 56.1 kg (123 lb 10.9 oz)   LMP  (LMP Unknown)   SpO2 98%   BMI 21.23 kg/m²     Labs Reviewed and Include    Recent Labs   Lab 23  0629   *   CALCIUM 9.5   ALBUMIN 2.5*  2.3*   PROT 8.7*  7.8      K 4.0   CO2 27   CL 97   BUN 18   CREATININE 0.9   ALKPHOS 1,094*  987*   ALT 57*  50*   *  202*   BILITOT 2.2*  2.0*     Lab Results   Component Value Date    WBC 11.91 2023    HGB 10.3 (L) 2023    HCT 36.3 (L) 2023    MCV 87 2023     2023     No results for input(s): "TSH", "FREET4" in the last 168 hours.  Lab Results   Component Value Date    HGBA1C 8.8 (H) 07/10/2023       Nutritional status:   Body mass index is 21.23 kg/m².  Lab Results   Component Value Date    ALBUMIN 2.3 (L) 2023    ALBUMIN 2.5 (L) 2023    ALBUMIN 2.8 (L) 2023     No results found for: "PREALBUMIN"    Estimated Creatinine Clearance: 56 mL/min (based on SCr of 0.9 mg/dL).    Accu-Checks  Recent Labs     08/15/23  1529 08/15/23  1949 23  0808   POCTGLUCOSE 261* 224* 202*       Current Medications and/or Treatments Impacting Glycemic Control  Immunotherapy:    Immunosuppressants       None          Steroids:   Hormones (From admission, onward) "      Start     Stop Route Frequency Ordered    08/15/23 2231  melatonin tablet 9 mg         -- Oral Nightly PRN 08/15/23 2231          Pressors:    Autonomic Drugs (From admission, onward)      Start     Stop Route Frequency Ordered    08/15/23 0900  metoprolol succinate (TOPROL-XL) 24 hr split tablet 12.5 mg         -- Oral Daily 08/14/23 2130          Hyperglycemia/Diabetes Medications:   Antihyperglycemics (From admission, onward)      Start     Stop Route Frequency Ordered    08/15/23 1656  insulin aspart U-100 pen 0-5 Units         -- SubQ Before meals & nightly PRN 08/15/23 1556    08/15/23 1645  insulin aspart U-100 pen 3 Units         -- SubQ 3 times daily with meals 08/15/23 1235    08/15/23 1345  insulin detemir U-100 (Levemir) pen 8 Units         -- SubQ Daily 08/15/23 1235

## 2023-08-16 NOTE — ASSESSMENT & PLAN NOTE
Recent (6/2023) suspected biliary pancreatitis and biliary stricture treated with biliary sphincterotomy, biliary stent, and cholecystectomy.  CT A/P with new innumerable hepatic lesions:  1. Innumerable low attenuating hepatic lesions, new since the previous exam.  Malignancy remains a concern however given short-term development, correlation is needed to exclude multifocal infection/abscess in this patient status post bile duct stent placement and cholecystectomy.  Please note, there is a low attenuating focus within the gallbladder fossa, similar to the foci throughout the hepatic parenchyma..  2. Pancreatic ductal dilatation up to 4 mm, minimally increased compared to prior.  There is fullness of the pancreatic head, underlying mass is not excluded, correlation with recent ERCP advised.  3. Simple and complex bilateral renal cysts.  4. Biliary stent in place with expected pneumobilia.  5. Cholecystectomy with scattered fluid about the gallbladder fossa.  6. Moderate stool throughout the colon, may reflect developing constipation.  7.  Additional findings as above.     - concern for lesions seen on CT A/P  - hepatology consulted last admit, appreciate assistance

## 2023-08-16 NOTE — ASSESSMENT & PLAN NOTE
BG goal: 140-180   T2DM.  Reports significant low on 12 units of Levemir previously. Will monitor on WBD ~0.3 and adjust as needed.    - Continue Levemir 8 units daily  - Novolog 3 units AC  - Continue LDC 50/150  - POCT Glucose before meals and at bedtime  - Hypoglycemia protocol in place      ** Please notify Endocrine for any change and/or advance in diet**  ** Please call Endocrine for any BG related issues **     Discharge Planning:   TBD. Please notify endocrinology prior to discharge.

## 2023-08-16 NOTE — PROGRESS NOTES
[facility]  Cardiology  Progress Note    Patient Name: Mariann Huff  MRN: [id number]  Admission Date: 8/14/2023  Hospital Length of Stay: 2 days  Code Status: Full Code   Attending Physician: Wilman Kim MD   Primary Care Physician: Jasbir Haney MD  Expected Discharge Date: 8/18/2023  Principal Problem:STEMI (ST elevation myocardial infarction)    Subjective:     Hospital Course:   Patient presented to the ED with nausea, vomiting, and chest pain. EKG in the ED showed posterior changes consistent with STEMI. Patient was taken to cath lab and her symptoms resolved after PCI. Patient chest pain resolved. Patient reports development of acute abdominal pain associated with nausea and vomiting. Patient was recently admitted (6/23) for suspected biliary pancreatitis and biliary stricture treated with biliary sphincterotomy, biliary stent, and cholecystectomy. Patient now has worsening transaminitis w/ elevated TB.   Concern for stent occlusion. Patient amylase 107 wnl, and Lipase 61. Workup will include total abdominal ultrasound and consult to [facility] NPO diet and maintenance fluids pending workup. Was scheduled for outpatient w/u of hepatic lesions found on previous admission 8/2/23.        Review of Systems   Constitutional: Positive for decreased appetite and malaise/fatigue. Negative for chills and fever.   HENT: Negative.     Cardiovascular:  Positive for chest pain. Negative for leg swelling and palpitations.   Respiratory: Negative.  Negative for cough and shortness of breath.    Musculoskeletal: Negative.    Gastrointestinal:  Positive for nausea and vomiting. Negative for abdominal pain.   Genitourinary: Negative.    Neurological: Negative.      Objective:     Vital Signs (Most Recent):  Temp: 97.7 °F (36.5 °C) (08/16/23 1136)  Pulse: 81 (08/16/23 1136)  Resp: 18 (08/16/23 1136)  BP: 136/71 (08/16/23 1136)  SpO2: 99 % (08/16/23 1136) Vital Signs (24h  Range):  Temp:  [97.7 °F (36.5 °C)-98.5 °F (36.9 °C)] 97.7 °F (36.5 °C)  Pulse:  [72-83] 81  Resp:  [18-20] 18  SpO2:  [97 %-100 %] 99 %  BP: ()/(54-71) 136/71     Weight: 56.1 kg (123 lb 10.9 oz)  Body mass index is 21.23 kg/m².     SpO2: 99 %         Intake/Output Summary (Last 24 hours) at 8/16/2023 1427  Last data filed at 8/16/2023 1312  Gross per 24 hour   Intake 0 ml   Output --   Net 0 ml         Lines/Drains/Airways       Peripheral Intravenous Line  Duration                  Peripheral IV - Single Lumen 08/16/23 0934 20 G;1 3/4 in Left Upper Arm <1 day                       Physical Exam  Vitals and nursing note reviewed.   Constitutional:       General: She is not in acute distress.     Appearance: She is not toxic-appearing or diaphoretic.   HENT:      Head: Normocephalic and atraumatic.      Mouth/Throat:      Mouth: Mucous membranes are moist.      Pharynx: Oropharynx is clear.   Eyes:      Extraocular Movements: Extraocular movements intact.      Conjunctiva/sclera: Conjunctivae normal.   Cardiovascular:      Rate and Rhythm: Normal rate and regular rhythm.   Pulmonary:      Effort: Pulmonary effort is normal. No respiratory distress.      Breath sounds: No wheezing.   Abdominal:      General: There is no distension.      Palpations: Abdomen is soft.      Tenderness: There is generalized abdominal tenderness. There is no guarding.   Musculoskeletal:         General: No tenderness. Normal range of motion.      Cervical back: Normal range of motion and neck supple.      Right lower leg: No edema.      Left lower leg: No edema.   Skin:     General: Skin is warm and dry.   Neurological:      Mental Status: She is alert and oriented to person, place, and time. Mental status is at baseline.      Cranial Nerves: No dysarthria.   Psychiatric:         Mood and Affect: Mood normal.         Behavior: Behavior normal.            Significant Labs: All pertinent lab results from the last 24 hours have been  reviewed.    Significant Imaging:  All relevant imaging reviewed    Assessment and Plan:         * STEMI (ST elevation myocardial infarction)  -Started having near syncope around 4 pm  -Bedside echo in the ER showed an EF 45-50% and norma-lateral and infero-lateral hypokinesis  -She underwent Successful primary PCI of ramus intermedius and lateral branch with Pronto thrombectomy and 3X16 GINGER     Plan  - got 750 cc in the lab and additional IVF @ 100 cc/hr x 12 hours, LVEDP 9 and appeared volume down (was discharged on lasix 40 daily for ADHF, might have to discharge home this time on 20 daily lasix)  - Bed rest x 4 hours   - Continue aspirin 81 mg daily, stop after 1 month to avoid triple therapy  - Loaded with brilinta 180, loaded with plavix 600 8/15/23, and 75 qd there after  - Hold Eliquis for a fib, using Lovenox. Resume upon discharge  - Continue high intensity statin therapy (LDL goal < 70  - TTE shows ejection fraction of about 40% associated with grade II diastolic dysfunction.      Abdominal pain  Patient reports development of acute abdominal pain associated with nausea and vomiting. Patient was recently admitted (6/23) for suspected biliary pancreatitis and biliary stricture treated with biliary sphincterotomy, biliary stent, and cholecystectomy.     - worsening transaminitis w/ elevated TB, Concern for stent occlusion  - Patient amylase 107 wnl, and Lipase 61  -Total abdominal ultrasound and consult to [facility]  - NPO and maintenance fluids pending workup    CHF (congestive heart failure)  -Last week had CXR with B/L edema, , EF decreased to 40-45%  -Got IV lasix while inpatient and was discharged on 40 po daily however became dehydrated and weak and LVEDP -in the lab showed LVEDP 9  -Getting  cc/h x 10 hours    Liver lesion  -s/p biliary pancreatitis s/p sphincterotomy, biliary stent, cholecystectomy in 06/2023  -Imaging revealed liver lesions concerning for  abscess  -was seen by ID last admit on 8/7/23 and started on cefpodoxime 400 po bid by Dr Archibald, continue the same  -would need liver lesions biopsy when feasible to r/o malignancy  -will need hepatology and ID f/up post discharge, refer to their notes from last week    History of pancreatitis  Recent (6/2023) suspected biliary pancreatitis and biliary stricture treated with biliary sphincterotomy, biliary stent, and cholecystectomy.  CT A/P with new innumerable hepatic lesions:  1. Innumerable low attenuating hepatic lesions, new since the previous exam.  Malignancy remains a concern however given short-term development, correlation is needed to exclude multifocal infection/abscess in this patient status post bile duct stent placement and cholecystectomy.  Please note, there is a low attenuating focus within the gallbladder fossa, similar to the foci throughout the hepatic parenchyma..  2. Pancreatic ductal dilatation up to 4 mm, minimally increased compared to prior.  There is fullness of the pancreatic head, underlying mass is not excluded, correlation with recent ERCP advised.  3. Simple and complex bilateral renal cysts.  4. Biliary stent in place with expected pneumobilia.  5. Cholecystectomy with scattered fluid about the gallbladder fossa.  6. Moderate stool throughout the colon, may reflect developing constipation.  7.  Additional findings as above.     - concern for lesions seen on CT A/P  - hepatology consulted last admit, appreciate assistance       Type 2 diabetes mellitus with stage 2 chronic kidney disease and hypertension  Home Antihyperglycemic Regiment:  -Farxiga, glipizide, insulin daily at home  - Titrate and addition of insulin as needed for BG goal 140-180  - SSI with POCT accuchecks ACHS and Diabetic diet 2000 beti  - Diabetic nutritional counseling given   - Continue home gabapentin for diabetic peripheral neuropathy  - Appreciate Endocrinology recs    - Patient NPO due to generalized  abdominal pain and concern for biliary stent occlusion   - Adjust insulin and monitor closely    Sleep apnea  - CPAP QHS      VTE Risk Mitigation (From admission, onward)         Ordered     enoxaparin injection 60 mg  Every 12 hours         08/15/23 3678                Jim Nicholson DO  Cardiology  [facility]

## 2023-08-16 NOTE — ASSESSMENT & PLAN NOTE
-Started having near syncope around 4 pm  -Bedside echo in the ER showed an EF 45-50% and norma-lateral and infero-lateral hypokinesis  -She underwent Successful primary PCI of ramus intermedius and lateral branch with Pronto thrombectomy and 3X16 GINGER     Plan  - got 750 cc in the lab and additional IVF @ 100 cc/hr x 12 hours, LVEDP 9 and appeared volume down (was discharged on lasix 40 daily for ADHF, might have to discharge home this time on 20 daily lasix)  - Bed rest x 4 hours   - Continue aspirin 81 mg daily, stop after 1 month to avoid triple therapy  - Loaded with brilinta 180, loaded with plavix 600 8/15/23, and 75 qd there after  - Hold Eliquis for a fib, using Lovenox. Resume upon discharge  - Continue high intensity statin therapy (LDL goal < 70  - TTE shows ejection fraction of about 40% associated with grade II diastolic dysfunction.

## 2023-08-16 NOTE — ASSESSMENT & PLAN NOTE
Patient reports development of acute abdominal pain associated with nausea and vomiting. Patient was recently admitted (6/23) for suspected biliary pancreatitis and biliary stricture treated with biliary sphincterotomy, biliary stent, and cholecystectomy.     - worsening transaminitis w/ elevated TB, Concern for stent occlusion  - Patient amylase 107 wnl, and Lipase 61  -Total abdominal ultrasound and consult to [facility]  - NPO and maintenance fluids pending workup

## 2023-08-16 NOTE — SUBJECTIVE & OBJECTIVE
Review of Systems   Constitutional: Positive for decreased appetite and malaise/fatigue. Negative for chills and fever.   HENT: Negative.     Cardiovascular:  Positive for chest pain. Negative for leg swelling and palpitations.   Respiratory: Negative.  Negative for cough and shortness of breath.    Musculoskeletal: Negative.    Gastrointestinal:  Positive for nausea and vomiting. Negative for abdominal pain.   Genitourinary: Negative.    Neurological: Negative.      Objective:     Vital Signs (Most Recent):  Temp: 97.7 °F (36.5 °C) (08/16/23 1136)  Pulse: 81 (08/16/23 1136)  Resp: 18 (08/16/23 1136)  BP: 136/71 (08/16/23 1136)  SpO2: 99 % (08/16/23 1136) Vital Signs (24h Range):  Temp:  [97.7 °F (36.5 °C)-98.5 °F (36.9 °C)] 97.7 °F (36.5 °C)  Pulse:  [72-83] 81  Resp:  [18-20] 18  SpO2:  [97 %-100 %] 99 %  BP: ()/(54-71) 136/71     Weight: 56.1 kg (123 lb 10.9 oz)  Body mass index is 21.23 kg/m².     SpO2: 99 %         Intake/Output Summary (Last 24 hours) at 8/16/2023 1427  Last data filed at 8/16/2023 1312  Gross per 24 hour   Intake 0 ml   Output --   Net 0 ml         Lines/Drains/Airways       Peripheral Intravenous Line  Duration                  Peripheral IV - Single Lumen 08/16/23 0934 20 G;1 3/4 in Left Upper Arm <1 day                       Physical Exam  Vitals and nursing note reviewed.   Constitutional:       General: She is not in acute distress.     Appearance: She is not toxic-appearing or diaphoretic.   HENT:      Head: Normocephalic and atraumatic.      Mouth/Throat:      Mouth: Mucous membranes are moist.      Pharynx: Oropharynx is clear.   Eyes:      Extraocular Movements: Extraocular movements intact.      Conjunctiva/sclera: Conjunctivae normal.   Cardiovascular:      Rate and Rhythm: Normal rate and regular rhythm.   Pulmonary:      Effort: Pulmonary effort is normal. No respiratory distress.      Breath sounds: No wheezing.   Abdominal:      General: There is no distension.       Palpations: Abdomen is soft.      Tenderness: There is generalized abdominal tenderness. There is no guarding.   Musculoskeletal:         General: No tenderness. Normal range of motion.      Cervical back: Normal range of motion and neck supple.      Right lower leg: No edema.      Left lower leg: No edema.   Skin:     General: Skin is warm and dry.   Neurological:      Mental Status: She is alert and oriented to person, place, and time. Mental status is at baseline.      Cranial Nerves: No dysarthria.   Psychiatric:         Mood and Affect: Mood normal.         Behavior: Behavior normal.            Significant Labs: All pertinent lab results from the last 24 hours have been reviewed.    Significant Imaging:  All relevant imaging reviewed

## 2023-08-16 NOTE — PT/OT/SLP PROGRESS
Occupational Therapy      Patient Name:  Mariann Huff   MRN:  [id number]    Patient not seen today secondary to Patient unwilling to participate, Nausea/vomiting. OT attempt at 1135 and 1430 and patient nauseous at both attempts and unwilling to complete OOB activity necessary for accurate evaluation. Nursing reports patient has been vomiting all day. Will follow-up as appropriate.    8/16/2023

## 2023-08-16 NOTE — ASSESSMENT & PLAN NOTE
Home Antihyperglycemic Regiment:  -Farxiga, glipizide, insulin daily at home  - Titrate and addition of insulin as needed for BG goal 140-180  - SSI with POCT accuchecks ACHS and Diabetic diet 2000 beti  - Diabetic nutritional counseling given   - Continue home gabapentin for diabetic peripheral neuropathy  - Appreciate Endocrinology recs    - Patient NPO due to generalized abdominal pain and concern for biliary stent occlusion   - Adjust insulin and monitor closely

## 2023-08-16 NOTE — PLAN OF CARE
Problem: Diabetes Comorbidity  Goal: Blood Glucose Level Within Targeted Range  Outcome: Ongoing, Progressing     Problem: Fluid and Electrolyte Imbalance (Acute Kidney Injury/Impairment)  Goal: Fluid and Electrolyte Balance  Outcome: Ongoing, Progressing     Problem: Oral Intake Inadequate (Acute Kidney Injury/Impairment)  Goal: Optimal Nutrition Intake  Outcome: Ongoing, Progressing     Problem: Renal Function Impairment (Acute Kidney Injury/Impairment)  Goal: Effective Renal Function  Outcome: Ongoing, Progressing     Problem: Adult Inpatient Plan of Care  Goal: Plan of Care Review  Outcome: Ongoing, Progressing  Goal: Patient-Specific Goal (Individualized)  Outcome: Ongoing, Progressing  Goal: Absence of Hospital-Acquired Illness or Injury  Outcome: Ongoing, Progressing  Goal: Optimal Comfort and Wellbeing  Outcome: Ongoing, Progressing  Goal: Readiness for Transition of Care  Outcome: Ongoing, Progressing

## 2023-08-16 NOTE — PROGRESS NOTES
"[facility]  Endocrinology  Progress Note    Admit Date: 8/14/2023     Reason for Consult: Management of T2DM,       Diabetes diagnosis year: pt not sure - states "long time ago"; at least as far back as 2004 per review of elevated a1c in epic     Home Diabetes Medications:  Glipizide ER 10mg before breakfast. Levemir 2u daily on a sliding scale   How often checking glucose at home?  Usually 2x/day,   BG readings on regimen: Upper 100s- 200s  Hypoglycemia on the regimen? Lows on 12 units of Levemir  Missed doses on regimen?  Diabetes Complications include:     Hyperglycemia, Diabetic chronic kidney disease     , Diabetic peripheral neuropathy , and Diabetic peripheral angiopathy with/without gangrene     Complicating diabetes co morbidities:   History of MI, MURTAZA, and CKD        HPI:   Patient is a 63 y.o. female with CAD with GINGER , HTN, HLD, DM2, CKD, MURTAZA (not on CPAP), PAD, who presented to the ED to the ER after a near syncopal event at home around 4 pm. She also feels tired and her appetite lately has decreased. she had chest pain and visceral symptoms with nausea and vomiting. She reports losing weight as well.She has had abdominal symptoms with poor PO intake leading to orthostasis. Today she had another near syncopal event and is why she was brought to the ER. Here her ECG showed the posterior changes for which posterior EKG was obtained with showed STEMI. Bedside echo showed an EF 45-50% and norma-lateral and infero-lateral hypokinesis. She was taken to cath lab and her symptoms resolved after PCI. Please refer to PCI for full details. Endocrine consulted for bg management.      Interval HPI:   Overnight events: No acute events overnight. Patient in room 348/348 A. Blood glucose stable. BG at and above goal on current insulin regimen (SSI, prandial, and basal insulin ). Steroid use- None. 2 Days Post-Op  Renal function- Normal   Vasopressors-  None     Of note, patient did not receive " "scheduled Levemir yesterday afternoon; resulting in FBG excursions.   Endocrine will continue to follow and manage insulin orders inpatient.         Diet diabetic 1500 Calorie     Eatin%  Nausea: No  Hypoglycemia and intervention: No  Fever: No  TPN and/or TF: No      /63 (BP Location: Right arm, Patient Position: Lying)   Pulse 83   Temp 97.8 °F (36.6 °C)   Resp 18   Ht 5' 4" (1.626 m)   Wt 56.1 kg (123 lb 10.9 oz)   LMP  (LMP Unknown)   SpO2 98%   BMI 21.23 kg/m²     Labs Reviewed and Include    Recent Labs   Lab 23  0629   *   CALCIUM 9.5   ALBUMIN 2.5*  2.3*   PROT 8.7*  7.8      K 4.0   CO2 27   CL 97   BUN 18   CREATININE 0.9   ALKPHOS 1,094*  987*   ALT 57*  50*   *  202*   BILITOT 2.2*  2.0*     Lab Results   Component Value Date    WBC 11.91 2023    HGB 10.3 (L) 2023    HCT 36.3 (L) 2023    MCV 87 2023     2023     No results for input(s): "TSH", "FREET4" in the last 168 hours.  Lab Results   Component Value Date    HGBA1C 8.8 (H) 07/10/2023       Nutritional status:   Body mass index is 21.23 kg/m².  Lab Results   Component Value Date    ALBUMIN 2.3 (L) 2023    ALBUMIN 2.5 (L) 2023    ALBUMIN 2.8 (L) 2023     No results found for: "PREALBUMIN"    Estimated Creatinine Clearance: 56 mL/min (based on SCr of 0.9 mg/dL).    Accu-Checks  Recent Labs     08/15/23  1529 08/15/23  1949 23  0808   POCTGLUCOSE 261* 224* 202*       Current Medications and/or Treatments Impacting Glycemic Control  Immunotherapy:    Immunosuppressants       None          Steroids:   Hormones (From admission, onward)      Start     Stop Route Frequency Ordered    08/15/23 2231  melatonin tablet 9 mg         -- Oral Nightly PRN 08/15/23 2231          Pressors:    Autonomic Drugs (From admission, onward)      Start     Stop Route Frequency Ordered    08/15/23 0900  metoprolol succinate (TOPROL-XL) 24 hr split tablet 12.5 mg     "     -- Oral Daily 08/14/23 2130          Hyperglycemia/Diabetes Medications:   Antihyperglycemics (From admission, onward)      Start     Stop Route Frequency Ordered    08/15/23 1656  insulin aspart U-100 pen 0-5 Units         -- SubQ Before meals & nightly PRN 08/15/23 1556    08/15/23 1645  insulin aspart U-100 pen 3 Units         -- SubQ 3 times daily with meals 08/15/23 1235    08/15/23 1345  insulin detemir U-100 (Levemir) pen 8 Units         -- SubQ Daily 08/15/23 1235            ASSESSMENT and PLAN    Cardiac/Vascular  CAD (coronary artery disease)    Managed per primary team      Hyperlipidemia associated with type 2 diabetes mellitus  Managed per primary.   On statin per ADA      Endocrine  Type 2 diabetes mellitus with diabetic peripheral angiopathy without gangrene  BG goal: 140-180   T2DM.  Reports significant low on 12 units of Levemir previously. Will monitor on WBD ~0.3 and adjust as needed.    - Continue Levemir 8 units daily  - Novolog 3 units AC  - Continue LDC 50/150  - POCT Glucose before meals and at bedtime  - Hypoglycemia protocol in place      ** Please notify Endocrine for any change and/or advance in diet**  ** Please call Endocrine for any BG related issues **     Discharge Planning:   TBD. Please notify endocrinology prior to discharge.               Blayne Gray, DNP, FNP  Endocrinology  [facility]

## 2023-08-16 NOTE — ANESTHESIA PREPROCEDURE EVALUATION
[facility]  Anesthesia Pre-Operative Evaluation         Patient Name: Mariann Huff  YOB: 1960  MRN: [id number]    SUBJECTIVE:     Pre-operative evaluation for Procedure(s) (LRB):  ERCP (ENDOSCOPIC RETROGRADE CHOLANGIOPANCREATOGRAPHY) (N/A)     08/16/2023    Mariann Huff is a 63 y.o. female w/ a significant PMHx of CAD s/p GINGER to ostial LAD in 2014, HTN, HLD, DM2, MURTAZA who was admitted for syncope, chest pain, N/V and abdominal pain. EKG significant for STEMI with posterior changes . Pt is s/p PCI on 8/14/2023, which resolved her CP. Patient reports consistent nausea and vomiting.     PAPER CONSENT IN CHART     [facility] 8/3/23 (For NSTEMI with Trop up to 29)    The Ramus lesion was 70% stenosed.    The Ost Cx to Prox Cx lesion was 99% stenosed.    The ejection fraction was calculated to be 45%.    The pre-procedure left ventricular end diastolic pressure was 15.        The estimated blood loss was none.    Patient now presents for the above procedure(s).    Echo Summary 8/14/23:    Interpretation Summary    Left Ventricle: The left ventricle is normal in size. Ventricular mass is normal. Normal wall thickness. regional wall motion abnormalities present. See diagram for wall motion findings. There is moderately reduced systolic function. Ejection fraction by visual approximation is 40%. Grade II diastolic dysfunction.    Left Atrium: Left atrium is mildly dilated. The left atrium volume index is 35.5 mL/m2.    Right Ventricle: Normal right ventricular cavity size. Wall thickness is normal. Right ventricle wall motion  is normal. Systolic function is normal.    Mitral Valve: There is mild regurgitation.    IVC/SVC: Normal venous pressure at 3 mmHg.    Liver parenchyma is inhomgenous.  Clinical correlation is required.       Prev airway 7/12/23:    Induction:  Inhalational - mask    Intubated:  Postinduction    Mask Ventilation:  Easy mask    Attempts:  1     Attempted By:  CRNA    Method of Intubation:  Video laryngoscopy    Blade:  Shahid 2    Laryngeal View Grade: Grade I - full view of cords      Difficult Airway Encountered?: No      Complications:  None    Airway Device:  Oral endotracheal tube    Airway Device Size:  7.5    Style/Cuff Inflation:  Cuffed    Inflation Amount (mL):  6    Tube secured:  21    Secured at:  The lips    Placement Verified By:  Colorimetric ETCO2 device    Complicating Factors:  None    LDA:        Peripheral IV - Single Lumen 08/14/23 1941 18 G Right Antecubital (Active)   Site Assessment Clean;Dry;Intact;No redness;No swelling 08/16/23 0400   Extremity Assessment Distal to IV No abnormal discoloration 08/15/23 2100   Line Status Flushed;Saline locked 08/16/23 0400   Dressing Status Clean;Dry;Intact 08/16/23 0400   Dressing Intervention Integrity maintained 08/16/23 0400   Dressing Change Due 08/18/23 08/15/23 1101   Site Change Due 08/18/23 08/15/23 0701   Reason Not Rotated Not due 08/15/23 1101   Number of days: 1       Drips: None documented.      Patient Active Problem List   Diagnosis    Primary hypertension    Hyperlipidemia associated with type 2 diabetes mellitus    CAD (coronary artery disease)    Sleep apnea    Carotid stenosis, bilateral    History of non-ST elevation myocardial infarction (NSTEMI)    PAPA (acute kidney injury)    S/P coronary artery stent placement    Nuclear sclerosis - Right Eye    Primary localized osteoarthrosis, lower leg    PSC (posterior subcapsular cataract), right    DME (diabetic macular edema)    Refractive error    Pseudophakia    Type 2 diabetes mellitus with diabetic peripheral angiopathy without gangrene    Diabetic peripheral neuropathy associated with type 2 diabetes mellitus    Cervical arthritis    Neurogenic claudication    Lumbar herniated disc    Fatty liver disease, nonalcoholic    Arthritis of lumbar spine    Chronic pain    Fusion of spine, lumbar region     Lumbosacral radiculopathy at L5    Type 2 diabetes mellitus with stage 2 chronic kidney disease and hypertension    Asthma    Bradycardia    Delayed surgical wound healing    Rectal ulcer    Palliative care encounter    Moderate malnutrition    Acute deep vein thrombosis (DVT) of femoral vein of right lower extremity    Impaired functional mobility and endurance    (HFpEF) heart failure with preserved ejection fraction    Alteration in skin integrity    Debility    Acute systolic congestive heart failure    Left ureteral injury    Hydronephrosis    Weakness of both lower extremities    Poor balance    Type 2 diabetes mellitus with retinopathy, with long-term current use of insulin    Hypertensive retinopathy, bilateral    SI (sacroiliac) joint dysfunction    S/P ureteral reimplantation    Complex renal cyst    PAD (peripheral artery disease)    Compression fracture of spine    Nontraumatic compression fracture of T12 vertebra    Bilateral renal cysts    Chronic kidney disease, stage 3a    Type 2 diabetes mellitus with chronic kidney disease    History of DVT (deep vein thrombosis)    History of pancreatitis    Hyperbilirubinemia    ATN (acute tubular necrosis)    Hypophosphatemia    Renal impairment    History of hysterectomy    History of tracheostomy    History of COVID-19    Closed fracture of left proximal humerus    NSTEMI (non-ST elevated myocardial infarction)    Anemia    Liver lesion    Healthcare maintenance       Review of patient's allergies indicates:   Allergen Reactions    Milk containing products (dairy)      Causes GI distress       Current Inpatient Medications:      Current Facility-Administered Medications on File Prior to Encounter   Medication Dose Route Frequency Provider Last Rate Last Admin    0.9%  NaCl infusion   Intravenous Continuous Aime George  mL/hr at 10/27/22 0842 New Bag at 10/27/22 0842    fentaNYL 50 mcg/mL injection   mcg   mcg Intravenous PRN Mook Chavez, DO        midazolam (VERSED) 1 mg/mL injection 0.5-4 mg  0.5-4 mg Intravenous PRN Mook Chavez DO         Current Outpatient Medications on File Prior to Encounter   Medication Sig Dispense Refill    ACCU-CHEK EDIN PLUS METER Misc       acetaminophen (TYLENOL) 650 MG TbSR Take 1 tablet (650 mg total) by mouth every 8 (eight) hours. (Patient not taking: Reported on 8/10/2023) 50 tablet 0    albuterol (PROVENTIL/VENTOLIN HFA) 90 mcg/actuation inhaler Inhale 2 puffs into the lungs every 6 (six) hours as needed for Wheezing. (Patient not taking: Reported on 8/10/2023) 1 g 3    atorvastatin (LIPITOR) 40 MG tablet Take 1 tablet (40 mg total) by mouth every evening. 90 tablet 3    blood sugar diagnostic (ACCU-CHEK EDIN PLUS TEST STRP) Strp TEST BLOOD SUGARS 4 TIMES DAILY 150 strip 11    dapagliflozin (FARXIGA) 10 mg tablet Take 1 tablet (10 mg total) by mouth once daily. (Patient not taking: Reported on 7/10/2023) 90 tablet 3    diclofenac sodium (VOLTAREN) 1 % Gel Apply 2 g topically 3 (three) times daily. Apply to the area of pain 2-3x per day or night as needed (Patient not taking: Reported on 7/10/2023) 100 g 3    EScitalopram oxalate (LEXAPRO) 10 MG tablet Take 1 tablet (10 mg total) by mouth once daily. 90 tablet 2    gabapentin (NEURONTIN) 300 MG capsule Take 1 capsule (300 mg) in the morning and 2 capsules (600 mg) in the evening 90 capsule 0    glipiZIDE (GLUCOTROL) 10 MG TR24 Take 1 tablet (10 mg total) by mouth daily with breakfast. (Patient not taking: Reported on 8/10/2023) 90 tablet 3    insulin detemir U-100, Levemir, (LEVEMIR FLEXPEN) 100 unit/mL (3 mL) InPn pen Inject 14 Units into the skin every evening. (Patient taking differently: Inject into the skin every evening. Adjusted dose based on glucose) 12 mL 3    multivitamin (THERAGRAN) per tablet Take 1 tablet by mouth once daily. (Patient not taking: Reported on 8/10/2023)       "oxyCODONE (ROXICODONE) 5 MG immediate release tablet Take 1 tablet (5 mg total) by mouth every 4 (four) hours as needed for Pain. (Patient not taking: Reported on 8/10/2023) 42 tablet 0    pen needle, diabetic (NOVOTWIST) 32 gauge x 1/5" Ndle Use 1 needle to inject insulin four times daily 400 each 2    pen needle, diabetic 32 gauge x 5/32" Ndle Use as directed 100 each 0       Past Surgical History:   Procedure Laterality Date    ANGIOGRAM, CORONARY, WITH LEFT HEART CATHETERIZATION N/A 8/3/2023    Procedure: Angiogram, Coronary, with Left Heart Cath;  Surgeon: Aime George MD;  Location: [facility] CATH LAB;  Service: Cardiology;  Laterality: N/A;    ANGIOGRAM, CORONARY, WITH LEFT HEART CATHETERIZATION N/A 2023    Procedure: Angiogram, Coronary, with Left Heart Cath;  Surgeon: Wilman Kim MD;  Location: [facility] CATH LAB;  Service: Cardiology;  Laterality: N/A;    ANTEGRADE NEPHROSTOGRAPHY Left 2019    Procedure: Nephrostogram - antegrade;  Surgeon: Robin Boyd MD;  Location: [address];  Service: Urology;  Laterality: Left;    BRONCHOSCOPY N/A 2019    Procedure: BRONCHOSCOPY;  Surgeon: Sean Ruano MD;  Location: [facility] OR [address] [place] [address];  Service: General;  Laterality: N/A;    CARDIAC CATHETERIZATION      CATARACT EXTRACTION      cataract extraction left eye      cataracts      CAUDAL EPIDURAL STEROID INJECTION N/A 2019    Procedure: Injection-steroid-epidural-caudal;  Surgeon: Dave Bentley Jr., MD;  Location: [facility] PAIN MGT;  Service: Pain Management;  Laterality: N/A;     SECTION, LOW TRANSVERSE      COLONOSCOPY N/A 2019    Procedure: COLONOSCOPY;  Surgeon: Al Alaniz MD;  Location: [facility] ENDO (2ND FLR);  Service: Endoscopy;  Laterality: N/A;    CORONARY ANGIOPLASTY      CYSTOGRAM N/A 2019    Procedure: CYSTOGRAM INTRAOP;  Surgeon: Robin Boyd MD;  [facility];  Service: Urology;  Laterality: N/A;  1 HOUR    CYSTOSCOPY W/ " RETROGRADES Left 12/11/2019    Procedure: CYSTOSCOPY, WITH RETROGRADE PYELOGRAM;  Surgeon: Robin Boyd MD;  Location: [address];  Service: Urology;  Laterality: Left;  fluro    ENDOSCOPIC ULTRASOUND OF UPPER GASTROINTESTINAL TRACT N/A 6/15/2023    Procedure: ULTRASOUND, UPPER GI TRACT, ENDOSCOPIC;  Surgeon: Lisa Kim MD;  Location: [facility] ENDO (2ND FLR);  Service: Endoscopy;  Laterality: N/A;    ERCP N/A 6/15/2023    Procedure: ERCP (ENDOSCOPIC RETROGRADE CHOLANGIOPANCREATOGRAPHY);  Surgeon: Lisa Kim MD;  Location: [facility] ENDO (2ND FLR);  Service: Endoscopy;  Laterality: N/A;    ESOPHAGOGASTRODUODENOSCOPY W/ PEG  5/2/2019    Procedure: EGD, WITH PEG TUBE INSERTION;  Surgeon: Sean Ruano MD;  Location: [facility] OR [address] [place] [address];  Service: General;;    EXCISION TURBINATE, SUBMUCOUS      FLEXIBLE SIGMOIDOSCOPY N/A 5/13/2019    Procedure: SIGMOIDOSCOPY, FLEXIBLE;  Surgeon: ALBERTO Amin MD;  [facility]  Service: Endoscopy;  Laterality: N/A;    FLEXIBLE SIGMOIDOSCOPY N/A 5/21/2019    Procedure: SIGMOIDOSCOPY, FLEXIBLE;  Surgeon: ALBERTO Amin MD;  [facility]  Service: Endoscopy;  Laterality: N/A;    FUSION OF LUMBAR SPINE BY ANTERIOR APPROACH Left 4/12/2019    Procedure: FUSION, SPINE, LUMBAR, ANTERIOR APPROACH Left L5-S1 Anterior to Psoa Interbody Fusion, L5-S1 Posterior Instrumentation;  Surgeon: Mk George MD;  Location: [address];  Service: Neurosurgery;  Laterality: Left;  Porcedure:  Left L5-S1 Anterior to Psoa Interbody Fusion, L5-S1 Posterior Instrumentation  Surgery Time: 4 Hrs  LOS: 2-3  Anesthesia: General   Blood: Type & Screen  R    HAND SURGERY Left     HAND SURGERY Right     torn ligament repair/ Dr. Yeboah    HYSTERECTOMY      INJECTION OF STEROID Right 12/10/2020    Procedure: INJECTION, STEROID Right SI Joint Block and Steroid Injection;  Surgeon: Mk George MD;  Location: [facility];  Service: Neurosurgery;   Laterality: Right;  Procedure: Right SI Joint Block and Steroid Injection   SUrgery Time: 30 Min  LOS: 0  Anesthesia: MAC  Radiology: C-arm  Bed: Tomer 4 Poster  Position: Prone    INJECTION OF STEROID Right 9/28/2021    Procedure: INJECTION, STEROID Right SI joint block & steroid injection;  Surgeon: Mk George MD;  Location: [facility];  Service: Neurosurgery;  Laterality: Right;  Procedure: Right SI joint block & steroid injection  Surgery Time: 30m  Anesthesia: Local MAC  Radiology: C-arm  Bed: Regular  Position: Prone    IVUS, CORONARY  8/14/2023    Procedure: IVUS, Coronary;  Surgeon: Wilman Kim MD;  Location: [facility] CATH LAB;  Service: Cardiology;;    LAPAROSCOPIC CHOLECYSTECTOMY N/A 6/23/2023    Procedure: CHOLECYSTECTOMY, LAPAROSCOPIC;  Surgeon: Richard Penny MD;  Location: [address];  Service: General;  Laterality: N/A;    left foot surgery      left wrist surgery      LYSIS OF ADHESIONS N/A 2/19/2020    Procedure: LYSIS, ADHESIONS;  Surgeon: Robin Boyd MD;  Location: [address];  Service: Urology;  Laterality: N/A;    NASAL SEPTUM SURGERY  5/7/15    OPEN REDUCTION AND INTERNAL FIXATION (ORIF) OF FRACTURE OF PROXIMAL HUMERUS Left 7/12/2023    Procedure: ORIF, FRACTURE, HUMERUS, PROXIMAL ;  Surgeon: Tomasz Leary MD;  Location: [address];  Service: Orthopedics;  Laterality: Left;    PERCUTANEOUS NEPHROSTOMY Left 4/21/2019    Procedure: Creation, Nephrostomy, Percutaneous;  Surgeon: Karina Surgeon;  Location: [facility];  Service: Anesthesiology;  Laterality: Left;    REPAIR OF URETER  4/12/2019    Procedure: REPAIR, URETER;  Surgeon: Mk George MD;  Location: [address];  Service: Neurosurgery;;    REPLACEMENT OF NEPHROSTOMY TUBE N/A 7/18/2019    Procedure: REPLACEMENT, NEPHROSTOMY TUBE;  Surgeon: [facility] Diagnostic Provider;  Location: [address];  Service: Anesthesiology;  Laterality: N/A;  188    REPLACEMENT OF NEPHROSTOMY TUBE N/A  7/24/2019    Procedure: REPLACEMENT, NEPHROSTOMY TUBE;  Surgeon: [facility] Diagnostic Provider;  [address];  Service: Anesthesiology;  Laterality: N/A;  188    REPLACEMENT OF NEPHROSTOMY TUBE N/A 10/7/2019    Procedure: REPLACEMENT, NEPHROSTOMY TUBE;  Surgeon: [facility] Diagnostic Provider;  [facility];  Service: Anesthesiology;  Laterality: N/A;  189    REPLACEMENT OF NEPHROSTOMY TUBE N/A 11/25/2019    Procedure: REPLACEMENT, NEPHROSTOMY TUBE;  Surgeon: [facility] Diagnostic Provider;  [address];  Service: Anesthesiology;  Laterality: N/A;  Room 188/Bessy    REPLACEMENT OF NEPHROSTOMY TUBE Right 2/19/2020    Procedure: REPLACEMENT, NEPHROSTOMY TUBE removal removal;  Surgeon: Robin Boyd MD;  Location: [address];  Service: Urology;  Laterality: Right;    RIGHT HEART CATHETERIZATION Right 8/3/2023    Procedure: INSERTION, CATHETER, RIGHT HEART;  Surgeon: Aime George MD;  Location: [facility] CATH LAB;  Service: Cardiology;  Laterality: Right;    rt elbow surgery      S/P LAD COATED STENT  05/14/2010    6 total     S/P OM1 STENT  08/2003    SINUS SURGERY      F.E.S.S.    STENT, DRUG ELUTING, SINGLE VESSEL, CORONARY  8/14/2023    Procedure: Stent, Drug Eluting, Single Vessel, Coronary;  Surgeon: Wilman Kim MD;  Location: [facility] CATH LAB;  Service: Cardiology;;    TRACHEOSTOMY N/A 5/2/2019    Procedure: CREATION, TRACHEOSTOMY;  Surgeon: Sean Ruano MD;  Location: [address];  Service: General;  Laterality: N/A;    TUBAL LIGATION      URETERAL REIMPLANTION Left 2/19/2020    Procedure: REIMPLANTATION, URETER WITH PSOAS HITCH;  Surgeon: Robin Boyd MD;  Location: [address];  Service: Urology;  Laterality: Left;    VENTRICULOGRAM, LEFT  8/3/2023    Procedure: Ventriculogram, Left;  Surgeon: Aime George MD;  Location: [facility] CATH LAB;  Service: Cardiology;;       Social History:  Tobacco Use: Low Risk  (8/15/2023)    Patient History      Smoking Tobacco Use: Never     Smokeless Tobacco Use: Never     Passive Exposure: Not on file      Alcohol Use: Not At Risk (6/10/2023)    AUDIT-C     Frequency of Alcohol Consumption: Never     Average Number of Drinks: Not on file     Frequency of Binge Drinking: Never        OBJECTIVE:     Vital Signs Range (Last 24H):  Temp:  [36.7 °C (98.1 °F)-36.9 °C (98.5 °F)]   Pulse:  [72-83]   Resp:  [17-20]   BP: ()/(54-85)   SpO2:  [97 %-100 %]       Significant Labs:  Lab Results   Component Value Date    WBC 11.91 08/16/2023    HGB 10.3 (L) 08/16/2023    HCT 36.3 (L) 08/16/2023     08/16/2023    CHOL 190 08/02/2023    TRIG 152 (H) 08/02/2023    HDL 26 (L) 08/02/2023    ALT 50 (H) 08/16/2023     (H) 08/16/2023     08/15/2023    K 3.5 08/15/2023    CL 98 08/15/2023    CREATININE 1.0 08/15/2023    BUN 22 08/15/2023    CO2 32 (H) 08/15/2023    TSH 1.230 10/25/2022    INR 1.2 08/16/2023    GLUF 217 (H) 09/15/2014    HGBA1C 8.8 (H) 07/10/2023       Diagnostic Studies: No relevant studies.    EKG:   Results for orders placed or performed during the hospital encounter of 08/14/23   EKG 12-lead    Collection Time: 08/14/23  9:44 PM    Narrative    Test Reason :     Vent. Rate : 072 BPM     Atrial Rate : 072 BPM     P-R Int : 172 ms          QRS Dur : 080 ms      QT Int : 468 ms       P-R-T Axes : 065 034 250 degrees     QTc Int : 512 ms    Normal sinus rhythm  Nonspecific T wave abnormality  Prolonged QT  Abnormal ECG  When compared with ECG of 14-AUG-2023 19:27,  ST no longer depressed in Anterior leads  ST no longer elevated in Lateral leads  Nonspecific T wave abnormality, worse in Lateral leads  QT has lengthened  Confirmed by SONAM LARSEN MD ([id number]) on 8/15/2023 2:10:30 PM    Referred By: [email or web address]   SELF           Confirmed By:SONAM LARSEN MD       2D ECHO:  TTE:  Results for orders placed or performed during the hospital encounter of 08/14/23   Echo   Result Value Ref Range    Ascending  "aorta 2.67 cm    STJ 2.09 cm    AV mean gradient 1 mmHg    Ao peak canelo 0.78 m/s    Ao VTI 15.27 cm    IVS 0.87 0.6 - 1.1 cm    LA size 3.68 cm    Left Atrium Major Axis 4.84 cm    Left Atrium Minor Axis 4.84 cm    LVIDd 4.86 3.5 - 6.0 cm    LVIDs 3.92 2.1 - 4.0 cm    LVOT diameter 1.98 cm    LVOT peak VTI 14.98 cm    Posterior Wall 0.89 0.6 - 1.1 cm    MV Peak A Canelo 0.81 m/s    E wave deceleration time 162.08 msec    MV Peak E Canelo 0.60 m/s    PV Peak D Canelo 0.41 m/s    PV Peak S Canelo 0.42 m/s    RA Major Axis 4.65 cm    RA Width 3.47 cm    Sinus 2.96 cm    LA WIDTH 3.8 cm    MV stenosis pressure 1/2 time 47.00 ms    LV Diastolic Volume 110.46 mL    LV Systolic Volume 66.66 mL    LVOT peak canelo 0.67 m/s    TDI LATERAL 0.07 m/s    TDI SEPTAL 0.04 m/s    LA volume (mod) 39.50 cm3    MV "A" wave duration 7.61 msec    LV LATERAL E/E' RATIO 8.57 m/s    LV SEPTAL E/E' RATIO 15.00 m/s    FS 19 %    LA volume 57.53 cm3    LV mass 146.47 g    Left Ventricle Relative Wall Thickness 0.37 cm    AV valve area 3.02 cm²    AV Velocity Ratio 0.86     AV index (prosthetic) 0.98     MV valve area p 1/2 method 4.68 cm2    E/A ratio 0.74     Mean e' 0.06 m/s    Pulm vein S/D ratio 1.02     LVOT area 3.1 cm2    LVOT stroke volume 46.10 cm3    AV peak gradient 2 mmHg    E/E' ratio 10.91 m/s    DONNY by Velocity Ratio 2.64 cm²    BSA 1.62 m2    LV Systolic Volume Index 41.1 mL/m2    LV Diastolic Volume Index 68.19 mL/m2    LV Mass Index 90 g/m2    LA Volume Index 35.5 mL/m2    LA Volume Index (Mod) 24.4 mL/m2    ZLVIDS 2.54     ZLVIDD 0.59     Est. RA pres 3 mmHg    EF 40 %    Narrative      Left Ventricle: The left ventricle is normal in size. Ventricular mass   is normal. Normal wall thickness. regional wall motion abnormalities   present. See diagram for wall motion findings. There is moderately reduced   systolic function. Ejection fraction by visual approximation is 40%. Grade   II diastolic dysfunction.    Left Atrium: Left atrium is " mildly dilated. The left atrium volume   index is 35.5 mL/m2.    Right Ventricle: Normal right ventricular cavity size. Wall thickness   is normal. Right ventricle wall motion  is normal. Systolic function is   normal.    Mitral Valve: There is mild regurgitation.    IVC/SVC: Normal venous pressure at 3 mmHg.    Liver parenchyma is inhomgenous.  Clinical correlation is required.         LUNA:  No results found. However, due to the size of the patient record, not all encounters were searched. Please check Results Review for a complete set of results.    ASSESSMENT/PLAN:           Pre-op Assessment    I have reviewed the Patient Summary Reports.     I have reviewed the Nursing Notes. I have reviewed the NPO Status.   I have reviewed the Medications.     Review of Systems  Anesthesia Hx:  Denies Family Hx of Anesthesia complications.   Denies Personal Hx of Anesthesia complications.   Social:  No Alcohol Use, Non-Smoker    Cardiovascular:   Hypertension Past MI CAD  CABG/stent  CHF    Pulmonary:   Denies COPD. Asthma    Hepatic/GI:   PUD,    Musculoskeletal:   Arthritis     Neurological:   Neuromuscular Disease,    Endocrine:   Diabetes  Denies Obesity / BMI > 30      Physical Exam  General: Well nourished, Cooperative, Alert and Oriented    Airway:  Mallampati: III   Mouth Opening: Normal  TM Distance: Normal  Tongue: Normal  Neck ROM: Normal ROM    Dental:  Partial Dentures  Partial denture top right      Anesthesia Plan  Type of Anesthesia, risks & benefits discussed:    Anesthesia Type: Gen ETT  Intra-op Monitoring Plan: Standard ASA Monitors and Art Line  Post Op Pain Control Plan: multimodal analgesia and IV/PO Opioids PRN  Induction:  IV  Airway Plan: Direct and Video, Post-Induction  Informed Consent: Informed consent signed with the Patient and all parties understand the risks and agree with anesthesia plan.  All questions answered.   ASA Score: 4  Day of Surgery Review of History & Physical: H&P Update  referred to the surgeon/provider.    Ready For Surgery From Anesthesia Perspective.     .

## 2023-08-16 NOTE — ASSESSMENT & PLAN NOTE
-Last week had CXR with B/L edema, , EF decreased to 40-45%  -Got IV lasix while inpatient and was discharged on 40 po daily however became dehydrated and weak and LVEDP -in the lab showed LVEDP 9  -Getting  cc/h x 10 hours

## 2023-08-17 ENCOUNTER — ANESTHESIA (OUTPATIENT)
Dept: ENDOSCOPY | Facility: HOSPITAL | Age: 63
DRG: 246 | End: 2023-08-17
Payer: COMMERCIAL

## 2023-08-17 LAB
ALBUMIN SERPL BCP-MCNC: 2.2 G/DL (ref 3.5–5.2)
ALP SERPL-CCNC: 1212 U/L (ref 55–135)
ALT SERPL W/O P-5'-P-CCNC: 93 U/L (ref 10–44)
ANION GAP SERPL CALC-SCNC: 13 MMOL/L (ref 8–16)
APTT PPP: 36.2 SEC (ref 21–32)
AST SERPL-CCNC: 209 U/L (ref 10–40)
BASOPHILS # BLD AUTO: 0.02 K/UL (ref 0–0.2)
BASOPHILS NFR BLD: 0.1 % (ref 0–1.9)
BILIRUB SERPL-MCNC: 3.8 MG/DL (ref 0.1–1)
BUN SERPL-MCNC: 12 MG/DL (ref 8–23)
CALCIUM SERPL-MCNC: 8.6 MG/DL (ref 8.7–10.5)
CHLORIDE SERPL-SCNC: 101 MMOL/L (ref 95–110)
CO2 SERPL-SCNC: 30 MMOL/L (ref 23–29)
CREAT SERPL-MCNC: 0.8 MG/DL (ref 0.5–1.4)
DIFFERENTIAL METHOD: ABNORMAL
EOSINOPHIL # BLD AUTO: 0.1 K/UL (ref 0–0.5)
EOSINOPHIL NFR BLD: 0.3 % (ref 0–8)
ERYTHROCYTE [DISTWIDTH] IN BLOOD BY AUTOMATED COUNT: 16.5 % (ref 11.5–14.5)
EST. GFR  (NO RACE VARIABLE): >60 ML/MIN/1.73 M^2
GLUCOSE SERPL-MCNC: 136 MG/DL (ref 70–110)
HCT VFR BLD AUTO: 32.7 % (ref 37–48.5)
HGB BLD-MCNC: 9.6 G/DL (ref 12–16)
IMM GRANULOCYTES # BLD AUTO: 0.09 K/UL (ref 0–0.04)
IMM GRANULOCYTES NFR BLD AUTO: 0.5 % (ref 0–0.5)
INR PPP: 1.2 (ref 0.8–1.2)
LYMPHOCYTES # BLD AUTO: 1 K/UL (ref 1–4.8)
LYMPHOCYTES NFR BLD: 6 % (ref 18–48)
MAGNESIUM SERPL-MCNC: 2.5 MG/DL (ref 1.6–2.6)
MCH RBC QN AUTO: 24.5 PG (ref 27–31)
MCHC RBC AUTO-ENTMCNC: 29.4 G/DL (ref 32–36)
MCV RBC AUTO: 83 FL (ref 82–98)
MONOCYTES # BLD AUTO: 1 K/UL (ref 0.3–1)
MONOCYTES NFR BLD: 6.1 % (ref 4–15)
NEUTROPHILS # BLD AUTO: 14.4 K/UL (ref 1.8–7.7)
NEUTROPHILS NFR BLD: 87 % (ref 38–73)
NRBC BLD-RTO: 0 /100 WBC
PLATELET # BLD AUTO: 274 K/UL (ref 150–450)
PMV BLD AUTO: 12.2 FL (ref 9.2–12.9)
POCT GLUCOSE: 175 MG/DL (ref 70–110)
POCT GLUCOSE: 230 MG/DL (ref 70–110)
POCT GLUCOSE: 255 MG/DL (ref 70–110)
POCT GLUCOSE: 257 MG/DL (ref 70–110)
POCT GLUCOSE: 296 MG/DL (ref 70–110)
POTASSIUM SERPL-SCNC: 3.7 MMOL/L (ref 3.5–5.1)
PROT SERPL-MCNC: 8.1 G/DL (ref 6–8.4)
PROTHROMBIN TIME: 12.9 SEC (ref 9–12.5)
RBC # BLD AUTO: 3.92 M/UL (ref 4–5.4)
SODIUM SERPL-SCNC: 144 MMOL/L (ref 136–145)
WBC # BLD AUTO: 16.61 K/UL (ref 3.9–12.7)

## 2023-08-17 PROCEDURE — C1874 STENT, COATED/COV W/DEL SYS: HCPCS | Performed by: INTERNAL MEDICINE

## 2023-08-17 PROCEDURE — 88172 PR  EVALUATION OF FNA SMEAR TO DETERMINE ADEQUACY, FIRST EVAL: ICD-10-PCS | Mod: 26,,, | Performed by: PATHOLOGY

## 2023-08-17 PROCEDURE — 43276 ERCP STENT EXCHANGE W/DILATE: CPT | Mod: ,,, | Performed by: INTERNAL MEDICINE

## 2023-08-17 PROCEDURE — 74328 X-RAY BILE DUCT ENDOSCOPY: CPT | Mod: 26,,, | Performed by: INTERNAL MEDICINE

## 2023-08-17 PROCEDURE — 43242 PR UPGI ENDOSCOPY,FN NEEDLE BX,GUIDED: ICD-10-PCS | Mod: 51,,, | Performed by: INTERNAL MEDICINE

## 2023-08-17 PROCEDURE — 43276 ERCP STENT EXCHANGE W/DILATE: CPT | Performed by: INTERNAL MEDICINE

## 2023-08-17 PROCEDURE — 99223 PR INITIAL HOSPITAL CARE,LEVL III: ICD-10-PCS | Mod: 25,GC,, | Performed by: INTERNAL MEDICINE

## 2023-08-17 PROCEDURE — 88173 CYTOPATH EVAL FNA REPORT: CPT | Mod: 26,,, | Performed by: PATHOLOGY

## 2023-08-17 PROCEDURE — 25500020 PHARM REV CODE 255: Performed by: INTERNAL MEDICINE

## 2023-08-17 PROCEDURE — 88342 IMHCHEM/IMCYTCHM 1ST ANTB: CPT | Performed by: PATHOLOGY

## 2023-08-17 PROCEDURE — 88173 PR  INTERPRETATION OF FNA SMEAR: ICD-10-PCS | Mod: 26,,, | Performed by: PATHOLOGY

## 2023-08-17 PROCEDURE — 63600175 PHARM REV CODE 636 W HCPCS: Performed by: STUDENT IN AN ORGANIZED HEALTH CARE EDUCATION/TRAINING PROGRAM

## 2023-08-17 PROCEDURE — D9220A PRA ANESTHESIA: Mod: ANES,,, | Performed by: ANESTHESIOLOGY

## 2023-08-17 PROCEDURE — 99233 SBSQ HOSP IP/OBS HIGH 50: CPT | Mod: GC,,, | Performed by: INTERNAL MEDICINE

## 2023-08-17 PROCEDURE — 43242 EGD US FINE NEEDLE BX/ASPIR: CPT | Performed by: INTERNAL MEDICINE

## 2023-08-17 PROCEDURE — 63600175 PHARM REV CODE 636 W HCPCS: Performed by: NURSE PRACTITIONER

## 2023-08-17 PROCEDURE — 85610 PROTHROMBIN TIME: CPT | Performed by: INTERNAL MEDICINE

## 2023-08-17 PROCEDURE — D9220A PRA ANESTHESIA: ICD-10-PCS | Mod: ANES,,, | Performed by: ANESTHESIOLOGY

## 2023-08-17 PROCEDURE — 97535 SELF CARE MNGMENT TRAINING: CPT

## 2023-08-17 PROCEDURE — 88341 IMHCHEM/IMCYTCHM EA ADD ANTB: CPT | Performed by: PATHOLOGY

## 2023-08-17 PROCEDURE — 97165 OT EVAL LOW COMPLEX 30 MIN: CPT

## 2023-08-17 PROCEDURE — 83735 ASSAY OF MAGNESIUM: CPT | Performed by: INTERNAL MEDICINE

## 2023-08-17 PROCEDURE — 97116 GAIT TRAINING THERAPY: CPT | Mod: CQ

## 2023-08-17 PROCEDURE — 43242 EGD US FINE NEEDLE BX/ASPIR: CPT | Mod: 51,,, | Performed by: INTERNAL MEDICINE

## 2023-08-17 PROCEDURE — 88172 CYTP DX EVAL FNA 1ST EA SITE: CPT | Performed by: PATHOLOGY

## 2023-08-17 PROCEDURE — 74328 X-RAY BILE DUCT ENDOSCOPY: CPT | Mod: TC | Performed by: INTERNAL MEDICINE

## 2023-08-17 PROCEDURE — 88305 TISSUE EXAM BY PATHOLOGIST: CPT | Mod: 26,,, | Performed by: PATHOLOGY

## 2023-08-17 PROCEDURE — 25000003 PHARM REV CODE 250: Performed by: STUDENT IN AN ORGANIZED HEALTH CARE EDUCATION/TRAINING PROGRAM

## 2023-08-17 PROCEDURE — 27202059 HC NEEDLE, FNA (ANY): Performed by: INTERNAL MEDICINE

## 2023-08-17 PROCEDURE — 85025 COMPLETE CBC W/AUTO DIFF WBC: CPT | Performed by: INTERNAL MEDICINE

## 2023-08-17 PROCEDURE — 88172 CYTP DX EVAL FNA 1ST EA SITE: CPT | Mod: 26,,, | Performed by: PATHOLOGY

## 2023-08-17 PROCEDURE — 37000008 HC ANESTHESIA 1ST 15 MINUTES: Performed by: INTERNAL MEDICINE

## 2023-08-17 PROCEDURE — 97530 THERAPEUTIC ACTIVITIES: CPT | Mod: CQ

## 2023-08-17 PROCEDURE — 88341 PR IHC OR ICC EACH ADD'L SINGLE ANTIBODY  STAINPR: ICD-10-PCS | Mod: 26,,, | Performed by: PATHOLOGY

## 2023-08-17 PROCEDURE — 74328 PR  X-RAY FOR BILE DUCT ENDOSCOPY: ICD-10-PCS | Mod: 26,,, | Performed by: INTERNAL MEDICINE

## 2023-08-17 PROCEDURE — 27201014 HC GRASPER DEVICE: Performed by: INTERNAL MEDICINE

## 2023-08-17 PROCEDURE — 88177 CYTP FNA EVAL EA ADDL: CPT | Performed by: PATHOLOGY

## 2023-08-17 PROCEDURE — 25000003 PHARM REV CODE 250: Performed by: NURSE ANESTHETIST, CERTIFIED REGISTERED

## 2023-08-17 PROCEDURE — 88305 TISSUE EXAM BY PATHOLOGIST: ICD-10-PCS | Mod: 26,,, | Performed by: PATHOLOGY

## 2023-08-17 PROCEDURE — 63600175 PHARM REV CODE 636 W HCPCS: Performed by: ANESTHESIOLOGY

## 2023-08-17 PROCEDURE — 25000003 PHARM REV CODE 250

## 2023-08-17 PROCEDURE — D9220A PRA ANESTHESIA: Mod: CRNA,,, | Performed by: NURSE ANESTHETIST, CERTIFIED REGISTERED

## 2023-08-17 PROCEDURE — 88173 CYTOPATH EVAL FNA REPORT: CPT | Performed by: PATHOLOGY

## 2023-08-17 PROCEDURE — 63600175 PHARM REV CODE 636 W HCPCS

## 2023-08-17 PROCEDURE — 20600001 HC STEP DOWN PRIVATE ROOM

## 2023-08-17 PROCEDURE — C1769 GUIDE WIRE: HCPCS | Performed by: INTERNAL MEDICINE

## 2023-08-17 PROCEDURE — 80053 COMPREHEN METABOLIC PANEL: CPT

## 2023-08-17 PROCEDURE — 27202304 HC CANNULA, ERCP: Performed by: INTERNAL MEDICINE

## 2023-08-17 PROCEDURE — 99223 1ST HOSP IP/OBS HIGH 75: CPT | Mod: 25,GC,, | Performed by: INTERNAL MEDICINE

## 2023-08-17 PROCEDURE — 25000003 PHARM REV CODE 250: Performed by: INTERNAL MEDICINE

## 2023-08-17 PROCEDURE — 88341 IMHCHEM/IMCYTCHM EA ADD ANTB: CPT | Mod: 26,,, | Performed by: PATHOLOGY

## 2023-08-17 PROCEDURE — 99233 PR SUBSEQUENT HOSPITAL CARE,LEVL III: ICD-10-PCS | Mod: GC,,, | Performed by: INTERNAL MEDICINE

## 2023-08-17 PROCEDURE — 85730 THROMBOPLASTIN TIME PARTIAL: CPT | Performed by: INTERNAL MEDICINE

## 2023-08-17 PROCEDURE — 88342 IMHCHEM/IMCYTCHM 1ST ANTB: CPT | Mod: 26,,, | Performed by: PATHOLOGY

## 2023-08-17 PROCEDURE — 63600175 PHARM REV CODE 636 W HCPCS: Performed by: NURSE ANESTHETIST, CERTIFIED REGISTERED

## 2023-08-17 PROCEDURE — 43276 PR ERCP W/RMVL/EXCH STENT BILIARY/PANCREATIC DUCT W/DILATION: ICD-10-PCS | Mod: ,,, | Performed by: INTERNAL MEDICINE

## 2023-08-17 PROCEDURE — 25000003 PHARM REV CODE 250: Performed by: PHYSICIAN ASSISTANT

## 2023-08-17 PROCEDURE — D9220A PRA ANESTHESIA: ICD-10-PCS | Mod: CRNA,,, | Performed by: NURSE ANESTHETIST, CERTIFIED REGISTERED

## 2023-08-17 PROCEDURE — 27202125 HC BALLOON, EXTRACTION (ANY): Performed by: INTERNAL MEDICINE

## 2023-08-17 PROCEDURE — 88305 TISSUE EXAM BY PATHOLOGIST: CPT | Performed by: PATHOLOGY

## 2023-08-17 PROCEDURE — 37000009 HC ANESTHESIA EA ADD 15 MINS: Performed by: INTERNAL MEDICINE

## 2023-08-17 PROCEDURE — 88342 CHG IMMUNOCYTOCHEMISTRY: ICD-10-PCS | Mod: 26,,, | Performed by: PATHOLOGY

## 2023-08-17 PROCEDURE — 94660 CPAP INITIATION&MGMT: CPT

## 2023-08-17 DEVICE — STENT SYSTEM RMV
Type: IMPLANTABLE DEVICE | Site: BILE DUCT | Status: FUNCTIONAL
Brand: WALLFLEX BILIARY

## 2023-08-17 RX ORDER — FENTANYL CITRATE 50 UG/ML
INJECTION, SOLUTION INTRAMUSCULAR; INTRAVENOUS
Status: DISCONTINUED | OUTPATIENT
Start: 2023-08-17 | End: 2023-08-17

## 2023-08-17 RX ORDER — PROPOFOL 10 MG/ML
VIAL (ML) INTRAVENOUS
Status: DISCONTINUED | OUTPATIENT
Start: 2023-08-17 | End: 2023-08-17

## 2023-08-17 RX ORDER — SUCCINYLCHOLINE CHLORIDE 20 MG/ML
INJECTION INTRAMUSCULAR; INTRAVENOUS
Status: DISCONTINUED | OUTPATIENT
Start: 2023-08-17 | End: 2023-08-17

## 2023-08-17 RX ORDER — DEXAMETHASONE SODIUM PHOSPHATE 4 MG/ML
INJECTION, SOLUTION INTRA-ARTICULAR; INTRALESIONAL; INTRAMUSCULAR; INTRAVENOUS; SOFT TISSUE
Status: DISCONTINUED | OUTPATIENT
Start: 2023-08-17 | End: 2023-08-17

## 2023-08-17 RX ORDER — IBUPROFEN 200 MG
16 TABLET ORAL
Status: DISCONTINUED | OUTPATIENT
Start: 2023-08-17 | End: 2023-09-01 | Stop reason: HOSPADM

## 2023-08-17 RX ORDER — FENTANYL CITRATE 50 UG/ML
25 INJECTION, SOLUTION INTRAMUSCULAR; INTRAVENOUS EVERY 5 MIN PRN
Status: DISCONTINUED | OUTPATIENT
Start: 2023-08-17 | End: 2023-08-17

## 2023-08-17 RX ORDER — CIPROFLOXACIN 2 MG/ML
INJECTION, SOLUTION INTRAVENOUS
Status: DISCONTINUED | OUTPATIENT
Start: 2023-08-17 | End: 2023-08-17

## 2023-08-17 RX ORDER — GLUCAGON 1 MG
1 KIT INJECTION
Status: DISCONTINUED | OUTPATIENT
Start: 2023-08-17 | End: 2023-09-01 | Stop reason: HOSPADM

## 2023-08-17 RX ORDER — SODIUM CHLORIDE 9 MG/ML
INJECTION, SOLUTION INTRAVENOUS CONTINUOUS
Status: ACTIVE | OUTPATIENT
Start: 2023-08-17 | End: 2023-08-18

## 2023-08-17 RX ORDER — ONDANSETRON 2 MG/ML
INJECTION INTRAMUSCULAR; INTRAVENOUS
Status: DISCONTINUED | OUTPATIENT
Start: 2023-08-17 | End: 2023-08-17

## 2023-08-17 RX ORDER — PHENYLEPHRINE HCL IN 0.9% NACL 1 MG/10 ML
SYRINGE (ML) INTRAVENOUS
Status: DISCONTINUED | OUTPATIENT
Start: 2023-08-17 | End: 2023-08-17

## 2023-08-17 RX ORDER — INSULIN ASPART 100 [IU]/ML
0-5 INJECTION, SOLUTION INTRAVENOUS; SUBCUTANEOUS
Status: DISCONTINUED | OUTPATIENT
Start: 2023-08-17 | End: 2023-08-18

## 2023-08-17 RX ORDER — SODIUM CHLORIDE 9 MG/ML
INJECTION, SOLUTION INTRAVENOUS CONTINUOUS
Status: DISCONTINUED | OUTPATIENT
Start: 2023-08-17 | End: 2023-08-17

## 2023-08-17 RX ORDER — PHENYLEPHRINE HYDROCHLORIDE 10 MG/ML
INJECTION INTRAVENOUS
Status: DISCONTINUED | OUTPATIENT
Start: 2023-08-17 | End: 2023-08-17

## 2023-08-17 RX ORDER — HYDROMORPHONE HYDROCHLORIDE 1 MG/ML
0.5 INJECTION, SOLUTION INTRAMUSCULAR; INTRAVENOUS; SUBCUTANEOUS EVERY 6 HOURS PRN
Status: DISCONTINUED | OUTPATIENT
Start: 2023-08-17 | End: 2023-08-19

## 2023-08-17 RX ORDER — ONDANSETRON 2 MG/ML
4 INJECTION INTRAMUSCULAR; INTRAVENOUS DAILY PRN
Status: DISCONTINUED | OUTPATIENT
Start: 2023-08-17 | End: 2023-08-18

## 2023-08-17 RX ORDER — IBUPROFEN 200 MG
24 TABLET ORAL
Status: DISCONTINUED | OUTPATIENT
Start: 2023-08-17 | End: 2023-09-01 | Stop reason: HOSPADM

## 2023-08-17 RX ORDER — PANTOPRAZOLE SODIUM 40 MG/1
40 TABLET, DELAYED RELEASE ORAL
Status: DISCONTINUED | OUTPATIENT
Start: 2023-08-17 | End: 2023-08-19

## 2023-08-17 RX ADMIN — INSULIN ASPART 3 UNITS: 100 INJECTION, SOLUTION INTRAVENOUS; SUBCUTANEOUS at 04:08

## 2023-08-17 RX ADMIN — SUCCINYLCHOLINE CHLORIDE 120 MG: 20 INJECTION, SOLUTION INTRAMUSCULAR; INTRAVENOUS at 09:08

## 2023-08-17 RX ADMIN — PROMETHAZINE HYDROCHLORIDE 12.5 MG: 25 INJECTION, SOLUTION INTRAMUSCULAR; INTRAVENOUS at 12:08

## 2023-08-17 RX ADMIN — DEXAMETHASONE SODIUM PHOSPHATE 4 MG: 4 INJECTION, SOLUTION INTRAMUSCULAR; INTRAVENOUS at 09:08

## 2023-08-17 RX ADMIN — ESCITALOPRAM OXALATE 10 MG: 10 TABLET ORAL at 12:08

## 2023-08-17 RX ADMIN — SODIUM CHLORIDE: 9 INJECTION, SOLUTION INTRAVENOUS at 09:08

## 2023-08-17 RX ADMIN — CIPROFLOXACIN 400 MG: 2 INJECTION, SOLUTION INTRAVENOUS at 10:08

## 2023-08-17 RX ADMIN — PIPERACILLIN SODIUM AND TAZOBACTAM SODIUM 4.5 G: 4; .5 INJECTION, POWDER, LYOPHILIZED, FOR SOLUTION INTRAVENOUS at 04:08

## 2023-08-17 RX ADMIN — INSULIN ASPART 2 UNITS: 100 INJECTION, SOLUTION INTRAVENOUS; SUBCUTANEOUS at 12:08

## 2023-08-17 RX ADMIN — SODIUM CHLORIDE: 9 INJECTION, SOLUTION INTRAVENOUS at 12:08

## 2023-08-17 RX ADMIN — Medication 400 MG: at 12:08

## 2023-08-17 RX ADMIN — INSULIN DETEMIR 8 UNITS: 100 INJECTION, SOLUTION SUBCUTANEOUS at 12:08

## 2023-08-17 RX ADMIN — PHENYLEPHRINE HYDROCHLORIDE 150 MCG: 10 INJECTION INTRAVENOUS at 09:08

## 2023-08-17 RX ADMIN — ONDANSETRON 4 MG: 2 INJECTION INTRAMUSCULAR; INTRAVENOUS at 09:08

## 2023-08-17 RX ADMIN — ENOXAPARIN SODIUM 60 MG: 60 INJECTION SUBCUTANEOUS at 09:08

## 2023-08-17 RX ADMIN — PROPOFOL 60 MG: 10 INJECTION, EMULSION INTRAVENOUS at 09:08

## 2023-08-17 RX ADMIN — PHENYLEPHRINE HYDROCHLORIDE 100 MCG: 10 INJECTION INTRAVENOUS at 10:08

## 2023-08-17 RX ADMIN — SPIRONOLACTONE 12.5 MG: 25 TABLET ORAL at 12:08

## 2023-08-17 RX ADMIN — GABAPENTIN 600 MG: 300 CAPSULE ORAL at 09:08

## 2023-08-17 RX ADMIN — METOPROLOL SUCCINATE 12.5 MG: 25 TABLET, EXTENDED RELEASE ORAL at 12:08

## 2023-08-17 RX ADMIN — LOSARTAN POTASSIUM 25 MG: 25 TABLET, FILM COATED ORAL at 12:08

## 2023-08-17 RX ADMIN — PHENYLEPHRINE HYDROCHLORIDE 200 MCG: 10 INJECTION INTRAVENOUS at 09:08

## 2023-08-17 RX ADMIN — FENTANYL CITRATE 50 MCG: 50 INJECTION, SOLUTION INTRAMUSCULAR; INTRAVENOUS at 09:08

## 2023-08-17 RX ADMIN — PANTOPRAZOLE SODIUM 40 MG: 40 TABLET, DELAYED RELEASE ORAL at 12:08

## 2023-08-17 RX ADMIN — ATORVASTATIN CALCIUM 40 MG: 40 TABLET, FILM COATED ORAL at 09:08

## 2023-08-17 RX ADMIN — PROMETHAZINE HYDROCHLORIDE 12.5 MG: 25 INJECTION, SOLUTION INTRAMUSCULAR; INTRAVENOUS at 05:08

## 2023-08-17 RX ADMIN — ENOXAPARIN SODIUM 60 MG: 60 INJECTION SUBCUTANEOUS at 12:08

## 2023-08-17 RX ADMIN — PHENYLEPHRINE HYDROCHLORIDE 200 MCG: 10 INJECTION INTRAVENOUS at 10:08

## 2023-08-17 RX ADMIN — PIPERACILLIN SODIUM AND TAZOBACTAM SODIUM 4.5 G: 4; .5 INJECTION, POWDER, LYOPHILIZED, FOR SOLUTION INTRAVENOUS at 08:08

## 2023-08-17 RX ADMIN — CLOPIDOGREL BISULFATE 75 MG: 75 TABLET ORAL at 12:08

## 2023-08-17 RX ADMIN — GABAPENTIN 300 MG: 300 CAPSULE ORAL at 12:08

## 2023-08-17 RX ADMIN — Medication 400 MG: at 09:08

## 2023-08-17 RX ADMIN — ASPIRIN 81 MG: 81 TABLET, COATED ORAL at 12:08

## 2023-08-17 NOTE — CARE UPDATE
-Glucose Goal 140-180    -A1C:   Hemoglobin A1C   Date Value Ref Range Status   07/10/2023 8.8 (H) 4.0 - 5.6 % Final     Comment:     ADA Screening Guidelines:  5.7-6.4%  Consistent with prediabetes  >or=6.5%  Consistent with diabetes    High levels of fetal hemoglobin interfere with the HbA1C  assay. Heterozygous hemoglobin variants (HbS, HgC, etc)do  not significantly interfere with this assay.   However, presence of multiple variants may affect accuracy.           -HOME REGIMEN: Glipizide ER 10mg before breakfast. Levemir 2u daily on a sliding scale     -INPATIENT REGIMEN: correction scale and levemir 8 units daily     -GLUCOSE TREND FOR THE PAST 24HRS:   Recent Labs   Lab 08/15/23  1529 08/15/23  1949 08/16/23  0808 08/16/23  1134 08/16/23  1506 08/16/23  2044   POCTGLUCOSE 261* 224* 202* 225* 233* 201*         -NO HYPOGYCEMIAS NOTED     - Diet  Diet NPO Except for: Medication    -TOLERATING - % OF PO DIET     Remains in 348/348 A. NPO for generalized abdominal pain and concern for biliary stent occlusion per cards notes. BG above goal yesterday. Missed levemir and correction scale most of the day.           Plan:   - -Continue Levemir 8 units daily   - POCT Glucose every 4 hours  - Hypoglycemia protocol in place      ** Please notify Endocrine for any change and/or advance in diet**  ** Please call Endocrine for any BG related issues **     Discharge Planning:   TBD. Please notify endocrinology prior to discharge.

## 2023-08-17 NOTE — ANESTHESIA POSTPROCEDURE EVALUATION
Anesthesia Post Evaluation    Patient: Mariann Huff    Procedure(s) Performed: Procedure(s) (LRB):  ERCP (ENDOSCOPIC RETROGRADE CHOLANGIOPANCREATOGRAPHY) (N/A)    Final Anesthesia Type: general      Patient location during evaluation: PACU  Patient participation: Yes- Able to Participate  Level of consciousness: awake and alert  Post-procedure vital signs: reviewed and stable  Pain management: adequate  Airway patency: patent    PONV status at discharge: No PONV  Anesthetic complications: no      Cardiovascular status: hemodynamically stable  Respiratory status: spontaneous ventilation  Follow-up not needed.          Vitals Value Taken Time   /57 08/17/23 1046   Temp 36.7 °C (98 °F) 08/17/23 0907   Pulse 82 08/17/23 1052   Resp 13 08/17/23 1052   SpO2 100 % 08/17/23 1052   Vitals shown include unvalidated device data.      No case tracking events are documented in the log.      Pain/Derick Score: Pain Rating Prior to Med Admin: 8 (8/16/2023  7:37 PM)  Pain Rating Post Med Admin: 5 (8/16/2023  8:07 PM)

## 2023-08-17 NOTE — CONSULTS
[facility]  Gastroenterology  Consult Note    Patient Name: Mariann Huff  MRN: [id number]  Admission Date: 8/14/2023  Hospital Length of Stay: 3 days  Code Status: Full Code   Attending Provider: Wilman Kim MD   Consulting Provider: Vijay Bonilla MD  Primary Care Physician: Jasbir Haney MD  Principal Problem:STEMI (ST elevation myocardial infarction)    Inpatient consult to [facility]  Consult performed by: Vijay Bonilla MD  Consult ordered by: Dayanna Osorio MD        Subjective:     HPI:  Ms. Mariann Huff is a 63 year old female for whom [facility] is consulted for evaluation of hyperbilirubinemia. She has a PMH significant for CAD (status post PCI in 2014), DVT (on Apixaban), MURTAZA, pancreatitis (diagnosed 05/2023; attributed to TRULICITY usage initially; status post EUS/ERCP in 06/2023 showing gallbladder sludge and stricture in lower third of main duct suspected to be from pancreatitis with plastic stent placed into CBD; due for removal in 08/2023), peripheral artery disease (on PLETAL), and T2DM. She has a PSH significant for subtotal cholecystectomy (late 06/2023).     Hospital Course:   Patient was admitted to [facility] on 08/14 for management of posterior STEMI. She was taken to cath lab on admission and underwent PCI with three GINGER placement and initiated on PLAVIX. On 08/16, patient noted to develop worsening hyperbilirubinemia since admission (2 from 1.1), mildly elevated lipase (61), and transaminitis (, ALT 50's) associated with abdominal pain. CT A/P obtained on 08/16 showed multiple hepatic lesions, biliary stent in place with pneumobilia, evidence of prior cholecystectomy, and a mass measuring 4.7 X 3.3 cm at the duodenal/pancreatic head. [facility] consulted for further evaluation.     On initial bedside encounter, patient reports sudden onset of bilateral upper abdominal pain on 08/16. She reports symptom association with daily non-bloody emesis  since approximately 05/2023 and approximately 30 pounds of unintentional weight loss and eye yellowing since spring of 2023. She denies symptom association with melena, hematochezia, pale colored stools, family history of gastric, colon, and pancreatic cancer, and use of ETOH or cigarettes.       Past Medical History:   Diagnosis Date    Anticoagulant long-term use     Asthma     Back pain     Bradycardia, unspecified 05/08/2019    The etiology of the bradycardic episode is unclear.  The have appear to be respiratory in origin (at least the 1st episode).  We will continue to monitor carefully.  We are awaiting evaluation by Cardiology.     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, with long-term current use of insulin 01/09/2020       Past Surgical History:   Procedure Laterality Date    ANGIOGRAM, CORONARY, WITH LEFT HEART CATHETERIZATION N/A 8/3/2023    Procedure: Angiogram, Coronary, with Left Heart Cath;  Surgeon: Aime George MD;  Location: [facility] CATH LAB;  Service: Cardiology;  Laterality: N/A;    ANGIOGRAM, CORONARY, WITH LEFT HEART CATHETERIZATION N/A 8/14/2023    Procedure: Angiogram, Coronary, with Left Heart Cath;  Surgeon: Wilman Kim MD;  Location: [facility] CATH LAB;  Service: Cardiology;  Laterality: N/A;    ANTEGRADE NEPHROSTOGRAPHY Left 12/11/2019    Procedure: Nephrostogram -  antegrade;  Surgeon: Robin Boyd MD;  [facility];  Service: Urology;  Laterality: Left;    BRONCHOSCOPY N/A 2019    Procedure: BRONCHOSCOPY;  Surgeon: Sean Ruano MD;  [facility];  Service: General;  Laterality: N/A;    CARDIAC CATHETERIZATION      CATARACT EXTRACTION      cataract extraction left eye      cataracts      CAUDAL EPIDURAL STEROID INJECTION N/A 2019    Procedure: Injection-steroid-epidural-caudal;  Surgeon: Dave Bentley Jr., MD;  Location: [facility] PAIN MGT;  Service: Pain Management;  Laterality: N/A;     SECTION, LOW TRANSVERSE      COLONOSCOPY N/A 2019    Procedure: COLONOSCOPY;  Surgeon: Al Alaniz MD;  Location: [facility] (2ND FLR);  Service: Endoscopy;  Laterality: N/A;    CORONARY ANGIOPLASTY      CYSTOGRAM N/A 2019    Procedure: CYSTOGRAM INTRAOP;  Surgeon: Robin Boyd MD;  [facility];  Service: Urology;  Laterality: N/A;  1 HOUR    CYSTOSCOPY W/ RETROGRADES Left 2019    Procedure: CYSTOSCOPY, WITH RETROGRADE PYELOGRAM;  Surgeon: Robin Boyd MD;  [facility];  Service: Urology;  Laterality: Left;  fluro    ENDOSCOPIC ULTRASOUND OF UPPER GASTROINTESTINAL TRACT N/A 6/15/2023    Procedure: ULTRASOUND, UPPER GI TRACT, ENDOSCOPIC;  Surgeon: Lisa Kim MD;  Location: [facility] ENDO ([facility]);  Service: Endoscopy;  Laterality: N/A;    ERCP N/A 6/15/2023    Procedure: ERCP (ENDOSCOPIC RETROGRADE CHOLANGIOPANCREATOGRAPHY);  Surgeon: Lisa Kim MD;  Location: [facility] ENDO (2ND FLR);  Service: Endoscopy;  Laterality: N/A;    ESOPHAGOGASTRODUODENOSCOPY W/ PEG  2019    Procedure: EGD, WITH PEG TUBE INSERTION;  Surgeon: Sean Ruano MD;  [facility];  Service: General;;    EXCISION TURBINATE, SUBMUCOUS      FLEXIBLE SIGMOIDOSCOPY N/A 2019    Procedure: SIGMOIDOSCOPY, FLEXIBLE;  Surgeon: ALBERTO Amin MD;  Location: [facility] ENDO (2ND FLR);  Service:  Endoscopy;  Laterality: N/A;    FLEXIBLE SIGMOIDOSCOPY N/A 5/21/2019    Procedure: SIGMOIDOSCOPY, FLEXIBLE;  Surgeon: ALBERTO Amin MD;  Location: [facility] ENDO (2ND FLR);  Service: Endoscopy;  Laterality: N/A;    FUSION OF LUMBAR SPINE BY ANTERIOR APPROACH Left 4/12/2019    Procedure: FUSION, SPINE, LUMBAR, ANTERIOR APPROACH Left L5-S1 Anterior to Psoa Interbody Fusion, L5-S1 Posterior Instrumentation;  Surgeon: Mk George MD;  [address]  Service: Neurosurgery;  Laterality: Left;  Porcedure:  Left L5-S1 Anterior to Psoa Interbody Fusion, L5-S1 Posterior Instrumentation  Surgery Time: 4 Hrs  LOS: 2-3  Anesthesia: General   Blood: Type & Screen  R    HAND SURGERY Left     HAND SURGERY Right     torn ligament repair/ Dr. Yeboah    HYSTERECTOMY      INJECTION OF STEROID Right 12/10/2020    Procedure: INJECTION, STEROID Right SI Joint Block and Steroid Injection;  Surgeon: Mk George MD;  Location: [facility];  Service: Neurosurgery;  Laterality: Right;  Procedure: Right SI Joint Block and Steroid Injection   SUrgery Time: 30 Min  LOS: 0  Anesthesia: MAC  Radiology: C-arm  Bed: [place] [address] Poster  Position: Prone    INJECTION OF STEROID Right 9/28/2021    Procedure: INJECTION, STEROID Right SI joint block & steroid injection;  Surgeon: Mk George MD;  Location: [facility];  Service: Neurosurgery;  Laterality: Right;  Procedure: Right SI joint block & steroid injection  Surgery Time: 30m  Anesthesia: Local MAC  Radiology: C-arm  Bed: Regular  Position: Prone    IVUS, CORONARY  8/14/2023    Procedure: IVUS, Coronary;  Surgeon: Wilman Kim MD;  Location: [facility] CATH LAB;  Service: Cardiology;;    LAPAROSCOPIC CHOLECYSTECTOMY N/A 6/23/2023    Procedure: CHOLECYSTECTOMY, LAPAROSCOPIC;  Surgeon: Richard Penny MD;  [address];  Service: General;  Laterality: N/A;    left foot surgery      left wrist surgery      LYSIS OF ADHESIONS N/A 2/19/2020    Procedure: LYSIS,  ADHESIONS;  Surgeon: Robin Boyd MD;  Location: [address];  Service: Urology;  Laterality: N/A;    NASAL SEPTUM SURGERY  5/7/15    OPEN REDUCTION AND INTERNAL FIXATION (ORIF) OF FRACTURE OF PROXIMAL HUMERUS Left 7/12/2023    Procedure: ORIF, FRACTURE, HUMERUS, PROXIMAL ;  Surgeon: Tomasz Leary MD;  Location: [address];  Service: Orthopedics;  Laterality: Left;    PERCUTANEOUS NEPHROSTOMY Left 4/21/2019    Procedure: Creation, Nephrostomy, Percutaneous;  Surgeon: Karina Surgeon;  Location: [facility];  Service: Anesthesiology;  Laterality: Left;    REPAIR OF URETER  4/12/2019    Procedure: REPAIR, URETER;  Surgeon: Mk George MD;  Location: [address];  Service: Neurosurgery;;    REPLACEMENT OF NEPHROSTOMY TUBE N/A 7/18/2019    Procedure: REPLACEMENT, NEPHROSTOMY TUBE;  Surgeon: [facility] Diagnostic Provider;  Location: [address];  Service: Anesthesiology;  Laterality: N/A;  188    REPLACEMENT OF NEPHROSTOMY TUBE N/A 7/24/2019    Procedure: REPLACEMENT, NEPHROSTOMY TUBE;  Surgeon: [facility] Diagnostic Provider;  [address]  Service: Anesthesiology;  Laterality: N/A;  188    REPLACEMENT OF NEPHROSTOMY TUBE N/A 10/7/2019    Procedure: REPLACEMENT, NEPHROSTOMY TUBE;  Surgeon: [facility] Diagnostic Provider;  Location: [address];  Service: Anesthesiology;  Laterality: N/A;  189    REPLACEMENT OF NEPHROSTOMY TUBE N/A 11/25/2019    Procedure: REPLACEMENT, NEPHROSTOMY TUBE;  Surgeon: [facility] Diagnostic Provider;  [address]  Service: Anesthesiology;  Laterality: N/A;  Room 188/Bessy    REPLACEMENT OF NEPHROSTOMY TUBE Right 2/19/2020    Procedure: REPLACEMENT, NEPHROSTOMY TUBE removal removal;  Surgeon: Robin Boyd MD;  Location: [address];  Service: Urology;  Laterality: Right;    RIGHT HEART CATHETERIZATION Right 8/3/2023    Procedure: INSERTION, CATHETER, RIGHT HEART;  Surgeon: Aime George MD;  Location: [facility] CATH LAB;  Service:  Cardiology;  Laterality: Right;    rt elbow surgery      S/P LAD COATED STENT  05/14/2010    6 total     S/P OM1 STENT  08/2003    SINUS SURGERY      F.E.S.S.    STENT, DRUG ELUTING, SINGLE VESSEL, CORONARY  8/14/2023    Procedure: Stent, Drug Eluting, Single Vessel, Coronary;  Surgeon: Wilman Kim MD;  Location: [facility] CATH LAB;  Service: Cardiology;;    TRACHEOSTOMY N/A 5/2/2019    Procedure: CREATION, TRACHEOSTOMY;  Surgeon: Sean Ruano MD;  [facility];  Service: General;  Laterality: N/A;    TUBAL LIGATION      URETERAL REIMPLANTION Left 2/19/2020    Procedure: REIMPLANTATION, URETER WITH PSOAS HITCH;  Surgeon: Robin Boyd MD;  [facility];  Service: Urology;  Laterality: Left;    VENTRICULOGRAM, LEFT  8/3/2023    Procedure: Ventriculogram, Left;  Surgeon: Aime George MD;  Location: [facility] CATH LAB;  Service: Cardiology;;       Review of patient's allergies indicates:   Allergen Reactions    Milk containing products (dairy)      Causes GI distress     Family History       Problem Relation (Age of Onset)    Diabetes Mother, Father, Sister, Brother, Sister    Heart attack Father    Heart disease Mother    Leukemia Father    No Known Problems Sister, Brother, Brother, Maternal Grandmother, Maternal Grandfather, Paternal Grandmother, Paternal Grandfather, Son, Son, Maternal Aunt, Maternal Uncle, Paternal Aunt, Paternal Uncle          Tobacco Use    Smoking status: Never    Smokeless tobacco: Never   Substance and Sexual Activity    Alcohol use: No     Alcohol/week: 0.0 standard drinks of alcohol    Drug use: No    Sexual activity: Yes     Partners: Male     Birth control/protection: Post-menopausal     Comment:      Review of Systems   Constitutional:  Positive for fatigue and unexpected weight change. Negative for appetite change, chills and fever.   HENT:  Negative for congestion and trouble swallowing.    Eyes:  Negative for pain and  redness.   Respiratory:  Negative for cough and shortness of breath.    Cardiovascular:  Negative for chest pain and palpitations.   Gastrointestinal:  Positive for abdominal pain, nausea and vomiting. Negative for constipation and diarrhea.   Genitourinary:  Negative for difficulty urinating and dysuria.   Musculoskeletal:  Negative for back pain and neck pain.   Skin:  Negative for rash.   Neurological:  Positive for weakness. Negative for dizziness, light-headedness and headaches.     Objective:     Vital Signs (Most Recent):  Temp: 98 °F (36.7 °C) (08/17/23 0907)  Pulse: 101 (08/17/23 0907)  Resp: 15 (08/17/23 0907)  BP: (!) 143/71 (08/17/23 0907)  SpO2: 100 % (08/17/23 0907) Vital Signs (24h Range):  Temp:  [97.7 °F (36.5 °C)-98.4 °F (36.9 °C)] 98 °F (36.7 °C)  Pulse:  [] 101  Resp:  [15-20] 15  SpO2:  [94 %-100 %] 100 %  BP: (101-165)/(51-82) 143/71     Weight: 56.1 kg (123 lb 10.9 oz) (08/16/23 0824)  Body mass index is 21.23 kg/m².      Intake/Output Summary (Last 24 hours) at 8/17/2023 0909  Last data filed at 8/17/2023 0351  Gross per 24 hour   Intake 93.46 ml   Output --   Net 93.46 ml       Lines/Drains/Airways       Peripheral Intravenous Line  Duration                  Peripheral IV - Single Lumen 08/16/23 0934 20 G;1 3/4 in Left Upper Arm <1 day                     Physical Exam  Constitutional:       Appearance: Normal appearance.   Eyes:      General: Scleral icterus present.   Cardiovascular:      Rate and Rhythm: Normal rate and regular rhythm.      Pulses: Normal pulses.      Heart sounds: Normal heart sounds.   Pulmonary:      Effort: Pulmonary effort is normal. No respiratory distress.      Breath sounds: Normal breath sounds.   Abdominal:      General: Bowel sounds are normal. There is no distension.      Palpations: Abdomen is soft.      Tenderness: There is abdominal tenderness in the right upper quadrant, epigastric area and left upper quadrant. There is no guarding or rebound.    Skin:     General: Skin is warm and dry.      Coloration: Skin is jaundiced.   Neurological:      Mental Status: She is alert and oriented to person, place, and time.          Significant Labs:  All pertinent lab results from the last 24 hours have been reviewed.    Significant Imaging:  Imaging results within the past 24 hours have been reviewed.    Assessment/Plan:     GI  Hyperbilirubinemia  This is a 63 year old female with a PMH significant for CAD (status post PCI in 2014), DVT (on Apixaban), MURTAZA, pancreatitis (diagnosed 05/2023; attributed to TRULICITY usage initially; status post EUS/ERCP in 06/2023 showing gallbladder sludge and stricture in lower third of main duct suspected to be from pancreatitis with plastic stent placed into CBD; due for removal in 08/2023), peripheral artery disease (on PLETAL), and T2DM and a PSH significant for subtotal cholecystectomy (late 06/2023) who was admitted to [facility] on 08/14 for posterior STEMI requiring PCI on admission and initiation of ASA and PLAVIX. Hospital course associated with onset of abdominal pain and progressive hyperbilirubinemia since 08/16 with CT A/P non-contrast from that time concerning for pancreatic head mass with multiple hepatic lesions concerning for underlying pancreatic cancer.     Recommendations:     -Tentative plan for ERCP for stent exchange today with risk/benefit discussion prior to procedure regarding risk of bleeding with procedure and possible pancreatic lesion biopsy while on DAPT.   -Keep NPO for now.         Thank you for your consult. I will follow-up with patient. Please contact us if you have any additional questions.    Vijay Bonilla MD  Gastroenterology  [facility]

## 2023-08-17 NOTE — SUBJECTIVE & OBJECTIVE
Past Medical History:   Diagnosis Date    Anticoagulant long-term use     Asthma     Back pain     Bradycardia, unspecified 05/08/2019    The etiology of the bradycardic episode is unclear.  The have appear to be respiratory in origin (at least the 1st episode).  We will continue to monitor carefully.  We are awaiting evaluation by Cardiology.      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, with long-term current use of insulin 01/09/2020       Past Surgical History:   Procedure Laterality Date    ANGIOGRAM, CORONARY, WITH LEFT HEART CATHETERIZATION N/A 8/3/2023    Procedure: Angiogram, Coronary, with Left Heart Cath;  Surgeon: Aime George MD;  Location: [facility] CATH LAB;  Service: Cardiology;  Laterality: N/A;    ANGIOGRAM, CORONARY, WITH LEFT HEART CATHETERIZATION N/A 8/14/2023    Procedure: Angiogram, Coronary, with Left Heart Cath;  Surgeon: Wilman Kim MD;  Location: [facility] CATH LAB;  Service: Cardiology;  Laterality: N/A;    ANTEGRADE NEPHROSTOGRAPHY Left 12/11/2019    Procedure: Nephrostogram - antegrade;  Surgeon: Robin Boyd MD;  [facility];  Service: Urology;  Laterality: Left;    BRONCHOSCOPY N/A 5/2/2019    Procedure: BRONCHOSCOPY;  Surgeon: Sean Ruano MD;  Location: [facility] OR [facility] FLR;  Service: General;  Laterality: N/A;    CARDIAC CATHETERIZATION      CATARACT EXTRACTION       cataract extraction left eye      cataracts      CAUDAL EPIDURAL STEROID INJECTION N/A 2019    Procedure: Injection-steroid-epidural-caudal;  Surgeon: Dave Bentley Jr., MD;  Location: [facility] PAIN MGT;  Service: Pain Management;  Laterality: N/A;     SECTION, LOW TRANSVERSE      COLONOSCOPY N/A 2019    Procedure: COLONOSCOPY;  Surgeon: Al Alaniz MD;  Location: [facility] (2ND FLR);  Service: Endoscopy;  Laterality: N/A;    CORONARY ANGIOPLASTY      CYSTOGRAM N/A 2019    Procedure: CYSTOGRAM INTRAOP;  Surgeon: Robin Boyd MD;  [facility];  Service: Urology;  Laterality: N/A;  1 HOUR    CYSTOSCOPY W/ RETROGRADES Left 2019    Procedure: CYSTOSCOPY, WITH RETROGRADE PYELOGRAM;  Surgeon: Robin Boyd MD;  [facility];  Service: Urology;  Laterality: Left;  fluro    ENDOSCOPIC ULTRASOUND OF UPPER GASTROINTESTINAL TRACT N/A 6/15/2023    Procedure: ULTRASOUND, UPPER GI TRACT, ENDOSCOPIC;  Surgeon: Lisa Kim MD;  [facility]  Service: Endoscopy;  Laterality: N/A;    ERCP N/A 6/15/2023    Procedure: ERCP (ENDOSCOPIC RETROGRADE CHOLANGIOPANCREATOGRAPHY);  Surgeon: Lisa Kim MD;  [facility]  Service: Endoscopy;  Laterality: N/A;    ESOPHAGOGASTRODUODENOSCOPY W/ PEG  2019    Procedure: EGD, WITH PEG TUBE INSERTION;  Surgeon: Sean Ruano MD;  [facility];  Service: General;;    EXCISION TURBINATE, SUBMUCOUS      FLEXIBLE SIGMOIDOSCOPY N/A 2019    Procedure: SIGMOIDOSCOPY, FLEXIBLE;  Surgeon: ALBERTO Amin MD;  Location: [facility] ENDO (2ND FLR);  Service: Endoscopy;  Laterality: N/A;    FLEXIBLE SIGMOIDOSCOPY N/A 2019    Procedure: SIGMOIDOSCOPY, FLEXIBLE;  Surgeon: ALBERTO Amin MD;  [facility]  Service: Endoscopy;  Laterality: N/A;    FUSION OF LUMBAR SPINE BY ANTERIOR APPROACH Left 2019    Procedure: FUSION, SPINE, LUMBAR, ANTERIOR APPROACH Left  L5-S1 Anterior to Psoa Interbody Fusion, L5-S1 Posterior Instrumentation;  Surgeon: kM George MD;  Location: [address];  Service: Neurosurgery;  Laterality: Left;  Porcedure:  Left L5-S1 Anterior to Psoa Interbody Fusion, L5-S1 Posterior Instrumentation  Surgery Time: 4 Hrs  LOS: 2-3  Anesthesia: General   Blood: Type & Screen  R    HAND SURGERY Left     HAND SURGERY Right     torn ligament repair/ Dr. Yeboah    HYSTERECTOMY      INJECTION OF STEROID Right 12/10/2020    Procedure: INJECTION, STEROID Right SI Joint Block and Steroid Injection;  Surgeon: Mk George MD;  Location: [facility];  Service: Neurosurgery;  Laterality: Right;  Procedure: Right SI Joint Block and Steroid Injection   SUrgery Time: 30 Min  LOS: 0  Anesthesia: MAC  Radiology: C-arm  Bed: [place] [id number] Poster  Position: Prone    INJECTION OF STEROID Right 9/28/2021    Procedure: INJECTION, STEROID Right SI joint block & steroid injection;  Surgeon: Mk George MD;  Location: [facility];  Service: Neurosurgery;  Laterality: Right;  Procedure: Right SI joint block & steroid injection  Surgery Time: 30m  Anesthesia: Local MAC  Radiology: C-arm  Bed: Regular  Position: Prone    IVUS, CORONARY  8/14/2023    Procedure: IVUS, Coronary;  Surgeon: Wilman Kim MD;  Location: [facility] CATH LAB;  Service: Cardiology;;    LAPAROSCOPIC CHOLECYSTECTOMY N/A 6/23/2023    Procedure: CHOLECYSTECTOMY, LAPAROSCOPIC;  Surgeon: Richard Penny MD;  Location: [address];  Service: General;  Laterality: N/A;    left foot surgery      left wrist surgery      LYSIS OF ADHESIONS N/A 2/19/2020    Procedure: LYSIS, ADHESIONS;  Surgeon: Robin Boyd MD;  Location: [address];  Service: Urology;  Laterality: N/A;    NASAL SEPTUM SURGERY  5/7/15    OPEN REDUCTION AND INTERNAL FIXATION (ORIF) OF FRACTURE OF PROXIMAL HUMERUS Left 7/12/2023    Procedure: ORIF, FRACTURE, HUMERUS, PROXIMAL ;  Surgeon: Tomasz Leary MD;  Location: [address]  FLR;  Service: Orthopedics;  Laterality: Left;    PERCUTANEOUS NEPHROSTOMY Left 4/21/2019    Procedure: Creation, Nephrostomy, Percutaneous;  Surgeon: Karina Surgeon;  Location: [facility];  Service: Anesthesiology;  Laterality: Left;    REPAIR OF URETER  4/12/2019    Procedure: REPAIR, URETER;  Surgeon: Mk George MD;  [facility];  Service: Neurosurgery;;    REPLACEMENT OF NEPHROSTOMY TUBE N/A 7/18/2019    Procedure: REPLACEMENT, NEPHROSTOMY TUBE;  Surgeon: [facility];  Location: [facility] OR [facility] FLR;  Service: Anesthesiology;  Laterality: N/A;  188    REPLACEMENT OF NEPHROSTOMY TUBE N/A 7/24/2019    Procedure: REPLACEMENT, NEPHROSTOMY TUBE;  Surgeon: [facility];  [facility];  Service: Anesthesiology;  Laterality: N/A;  188    REPLACEMENT OF NEPHROSTOMY TUBE N/A 10/7/2019    Procedure: REPLACEMENT, NEPHROSTOMY TUBE;  Surgeon: [facility] Provider;  [facility];  Service: Anesthesiology;  Laterality: N/A;  189    REPLACEMENT OF NEPHROSTOMY TUBE N/A 11/25/2019    Procedure: REPLACEMENT, NEPHROSTOMY TUBE;  Surgeon: [facility];  [facility];  Service: Anesthesiology;  Laterality: N/A;  Room 188/Bessy    REPLACEMENT OF NEPHROSTOMY TUBE Right 2/19/2020    Procedure: REPLACEMENT, NEPHROSTOMY TUBE removal removal;  Surgeon: Robin Boyd MD;  [address]  Service: Urology;  Laterality: Right;    RIGHT HEART CATHETERIZATION Right 8/3/2023    Procedure: INSERTION, CATHETER, RIGHT HEART;  Surgeon: Aime George MD;  Location: [facility] CATH LAB;  Service: Cardiology;  Laterality: Right;    rt elbow surgery      S/P LAD COATED STENT  05/14/2010    6 total     S/P OM1 STENT  08/2003    SINUS SURGERY      F.E.S.S.    STENT, DRUG ELUTING, SINGLE VESSEL, CORONARY  8/14/2023    Procedure: Stent, Drug Eluting, Single Vessel, Coronary;  Surgeon: Wilman Kim MD;  Location: [facility] CATH LAB;  Service: Cardiology;;     TRACHEOSTOMY N/A 5/2/2019    Procedure: CREATION, TRACHEOSTOMY;  Surgeon: Sean Ruano MD;  Location: [facility] OR [address] [place] [address];  Service: General;  Laterality: N/A;    TUBAL LIGATION      URETERAL REIMPLANTION Left 2/19/2020    Procedure: REIMPLANTATION, URETER WITH PSOAS HITCH;  Surgeon: Robin Boyd MD;  [facility];  Service: Urology;  Laterality: Left;    VENTRICULOGRAM, LEFT  8/3/2023    Procedure: Ventriculogram, Left;  Surgeon: Aime George MD;  Location: [facility] CATH LAB;  Service: Cardiology;;       Review of patient's allergies indicates:   Allergen Reactions    Milk containing products (dairy)      Causes GI distress     Family History       Problem Relation (Age of Onset)    Diabetes Mother, Father, Sister, Brother, Sister    Heart attack Father    Heart disease Mother    Leukemia Father    No Known Problems Sister, Brother, Brother, Maternal Grandmother, Maternal Grandfather, Paternal Grandmother, Paternal Grandfather, Son, Son, Maternal Aunt, Maternal Uncle, Paternal Aunt, Paternal Uncle          Tobacco Use    Smoking status: Never    Smokeless tobacco: Never   Substance and Sexual Activity    Alcohol use: No     Alcohol/week: 0.0 standard drinks of alcohol    Drug use: No    Sexual activity: Yes     Partners: Male     Birth control/protection: Post-menopausal     Comment:      Review of Systems   Constitutional:  Positive for fatigue and unexpected weight change. Negative for appetite change, chills and fever.   HENT:  Negative for congestion and trouble swallowing.    Eyes:  Negative for pain and redness.   Respiratory:  Negative for cough and shortness of breath.    Cardiovascular:  Negative for chest pain and palpitations.   Gastrointestinal:  Positive for abdominal pain, nausea and vomiting. Negative for constipation and diarrhea.   Genitourinary:  Negative for difficulty urinating and dysuria.   Musculoskeletal:  Negative for back pain and neck pain.   Skin:  Negative  for rash.   Neurological:  Positive for weakness. Negative for dizziness, light-headedness and headaches.     Objective:     Vital Signs (Most Recent):  Temp: 98 °F (36.7 °C) (08/17/23 0907)  Pulse: 101 (08/17/23 0907)  Resp: 15 (08/17/23 0907)  BP: (!) 143/71 (08/17/23 0907)  SpO2: 100 % (08/17/23 0907) Vital Signs (24h Range):  Temp:  [97.7 °F (36.5 °C)-98.4 °F (36.9 °C)] 98 °F (36.7 °C)  Pulse:  [] 101  Resp:  [15-20] 15  SpO2:  [94 %-100 %] 100 %  BP: (101-165)/(51-82) 143/71     Weight: 56.1 kg (123 lb 10.9 oz) (08/16/23 0824)  Body mass index is 21.23 kg/m².      Intake/Output Summary (Last 24 hours) at 8/17/2023 0909  Last data filed at 8/17/2023 0351  Gross per 24 hour   Intake 93.46 ml   Output --   Net 93.46 ml       Lines/Drains/Airways       Peripheral Intravenous Line  Duration                  Peripheral IV - Single Lumen 08/16/23 0934 20 G;1 3/4 in Left Upper Arm <1 day                     Physical Exam  Constitutional:       Appearance: Normal appearance.   Eyes:      General: Scleral icterus present.   Cardiovascular:      Rate and Rhythm: Normal rate and regular rhythm.      Pulses: Normal pulses.      Heart sounds: Normal heart sounds.   Pulmonary:      Effort: Pulmonary effort is normal. No respiratory distress.      Breath sounds: Normal breath sounds.   Abdominal:      General: Bowel sounds are normal. There is no distension.      Palpations: Abdomen is soft.      Tenderness: There is abdominal tenderness in the right upper quadrant, epigastric area and left upper quadrant. There is no guarding or rebound.   Skin:     General: Skin is warm and dry.      Coloration: Skin is jaundiced.   Neurological:      Mental Status: She is alert and oriented to person, place, and time.          Significant Labs:  All pertinent lab results from the last 24 hours have been reviewed.    Significant Imaging:  Imaging results within the past 24 hours have been reviewed.

## 2023-08-17 NOTE — CARE UPDATE
-patient is reporting abdominal pain, CT scan from today reviewed, liver lesions have enlarged  -no mechanical obstruction, IV morphine 2 mg ordered

## 2023-08-17 NOTE — ASSESSMENT & PLAN NOTE
Home Antihyperglycemic Regiment:  -Farxiga, glipizide, insulin daily at home  - Titrate and addition of insulin as needed for BG goal 140-180  - SSI with POCT accuchecks ACHS and Diabetic diet 2000 beti  - Diabetic nutritional counseling given   - Continue home gabapentin for diabetic peripheral neuropathy  - Appreciate Endocrinology recs  - Patient NPO due to generalized abdominal pain and concern for biliary stent occlusion   - Adjust insulin and monitor closely  - Patient on clear liquid diet s/p EUS and ERCP. Continue to monitor glucose levels and titrate glucose for BG goal of 140-180

## 2023-08-17 NOTE — ASSESSMENT & PLAN NOTE
-Started having near syncope around 4 pm  -Bedside echo in the ER showed an EF 45-50% and norma-lateral and infero-lateral hypokinesis  -She underwent Successful primary PCI of ramus intermedius and lateral branch with Pronto thrombectomy and 3X16 GINGER     Plan  - got 750 cc in the lab and additional IVF @ 100 cc/hr x 12 hours, LVEDP 9 and appeared volume down (was discharged on lasix 40 daily for ADHF, might have to discharge home this time on 20 daily lasix)  - Bed rest x 4 hours   - Continue aspirin 81 mg daily, stop after 1 month to avoid triple therapy  - Loaded with brilinta 180, loaded with plavix 600 8/15/23, and 75 qd there after  - Hold Eliquis for a fib, using Lovenox  - Continue high intensity statin therapy (LDL goal < 70)  - TTE shows ejection fraction of about 40% associated with grade II diastolic dysfunction.    - Upon discharge, patient will continue aspirin and plavix  - Consider switching Eliquis to Lovenox due to recurrent hospitalizations that may require acute procedural interventions

## 2023-08-17 NOTE — ASSESSMENT & PLAN NOTE
-s/p biliary pancreatitis s/p sphincterotomy, biliary stent, cholecystectomy in 06/2023  -Imaging revealed liver lesions concerning for abscess  -was seen by ID last admit on 8/7/23 and started on cefpodoxime 400 po bid by Dr Archibald, continue the same  -would need liver lesions biopsy when feasible to r/o malignancy  -will need hepatology and ID f/up post discharge, refer to their notes from last week    - Patient was initially due to f/u Dr. Archibald in clinic on 8/23 due to suspected liver abscesses  - ERCP and EUS 8/17/23, elevated T-bili, elevated alk phos, and transaminitis.   - visualized suspected lesions, FNA with cytology work up  - F/u Cytology workup and heme/onc referral may be required. Previous admit 8/2/23 tumor markers were unremarkable (CA 19-9, CEA, AFP)  - Discuss with ID Abx regime if malignancy is ruled out  - She was escalated to Zosyn due to Leukocytosis, progressively worsening abdominal pain, and tachycardia meeting SIRS criteria. Plan to de escalate pending ID input

## 2023-08-17 NOTE — PLAN OF CARE
08/17/23 1131   Post-Acute Status   Post-Acute Authorization Placement   Post-Acute Placement Status Pending medical clearance/testing     Per treatment team pt is pending biopsy for possible pancreatic cancer.  Discharge disposition pending medical clearance.  Will continue to follow.      Anastacia Mesa LMSW  [facility]  [id number]

## 2023-08-17 NOTE — PLAN OF CARE
Patient ambulating in morales with PT. She appears in no acute distress. Vital signs are stable. Safety measures in place and call bell within reach.     Problem: Diabetes Comorbidity  Goal: Blood Glucose Level Within Targeted Range  Outcome: Ongoing, Progressing     Problem: Fluid and Electrolyte Imbalance (Acute Kidney Injury/Impairment)  Goal: Fluid and Electrolyte Balance  Outcome: Ongoing, Progressing     Problem: Oral Intake Inadequate (Acute Kidney Injury/Impairment)  Goal: Optimal Nutrition Intake  Outcome: Ongoing, Progressing     Problem: Renal Function Impairment (Acute Kidney Injury/Impairment)  Goal: Effective Renal Function  Outcome: Ongoing, Progressing     Problem: Adult Inpatient Plan of Care  Goal: Plan of Care Review  Outcome: Ongoing, Progressing  Goal: Patient-Specific Goal (Individualized)  Outcome: Ongoing, Progressing  Goal: Absence of Hospital-Acquired Illness or Injury  Outcome: Ongoing, Progressing  Goal: Optimal Comfort and Wellbeing  Outcome: Ongoing, Progressing  Goal: Readiness for Transition of Care  Outcome: Ongoing, Progressing     Problem: Fall Injury Risk  Goal: Absence of Fall and Fall-Related Injury  Outcome: Ongoing, Progressing

## 2023-08-17 NOTE — PT/OT/SLP PROGRESS
Physical Therapy Co-Treatment with OT    Patient Name:  Mariann Huff   MRN:  [id number]    Recommendations:     Discharge Recommendations: nursing facility, skilled  Discharge Equipment Recommendations: other (see comments) (TBD closer to discharge)  Barriers to discharge: Inaccessible home and Decreased caregiver support    Assessment:     Mariann Huff is a 63 y.o. female admitted with a medical diagnosis of STEMI (ST elevation myocardial infarction).  She presents with the following impairments/functional limitations: weakness, impaired endurance, gait instability, impaired functional mobility, pain  Pt was agreeable and tolerated session fairly well. Pt required mild encourage to participate. Pt demonstrated better bed mobility and increase total gait distance. Pt demonstrated some veering when ambulating and required cues for object avoiding. Pt is progressing well towards goals and continues to benefit from therapy to improve functional endurance, strength, and mobility.     Rehab Prognosis: Good; patient would benefit from acute skilled PT services to address these deficits and reach maximum level of function.    Recent Surgery: Procedure(s) (LRB):  ERCP (ENDOSCOPIC RETROGRADE CHOLANGIOPANCREATOGRAPHY) (N/A)  ULTRASOUND, UPPER GI TRACT, ENDOSCOPIC Day of Surgery    Plan:     During this hospitalization, patient to be seen 3 x/week to address the identified rehab impairments via gait training, therapeutic activities, therapeutic exercises, neuromuscular re-education and progress toward the following goals:    Plan of Care Expires:  09/14/23    Subjective     Chief Complaint: fatigued   Patient/Family Comments/goals: pt agreeable to therapy with mild encouragement   Pain/Comfort:  Pain Rating 1: 4/10  Location - Side 1: Left  Location - Orientation 1: generalized  Location 1: shoulder  Pain Addressed 1: Reposition  Pain Rating Post-Intervention 1:  (did not rate)      Objective:     Communicated with RN  prior to session.  Patient found HOB elevated with telemetry, peripheral IV upon PT entry to room.     General Precautions: Standard, fall  Orthopedic Precautions: N/A  Braces: N/A  Respiratory Status: Room air     Functional Mobility:  Additional staff present: OT  Co-treatment performed due to patient's multiple deficits requiring two skilled therapists to appropriately and safely assess patient's strength and endurance while facilitating functional tasks in addition to accommodating for patient's activity tolerance  Bed Mobility:   Scooting to EOB: contact guard assistance  Supine to Sit: minimum assistance; HOB elevated  Increase time to complete   Assisted with trunk managment  Sit to Supine: contact guard assistance  Transfers:   Sit <> Stand Transfer: minimum assistance with rolling walker   Cues to push off from bed for safety   Gait:  Pt ambulated ~12+80ft with contact guard assistance and minimum assistance and rolling walker. Bedside chair following   Ambulated to the bathroom with OT (12ft), then ambulated in the hallway (80ft)  Initially Ena, but progressed to CGA  Gait Deviation(s): occasional unsteady gait with decrease marina, slight veering (more right than left), slight flexed posture, and decrease step length  Verbal/tactile cues for Rw management, object avoidance, and pacing  Balance:   Standing balance: SBA   Activities with OT     AM-PAC 6 CLICK MOBILITY  Turning over in bed (including adjusting bedclothes, sheets and blankets)?: 3  Sitting down on and standing up from a chair with arms (e.g., wheelchair, bedside commode, etc.): 3  Moving from lying on back to sitting on the side of the bed?: 3  Moving to and from a bed to a chair (including a wheelchair)?: 3  Need to walk in hospital room?: 3  Climbing 3-5 steps with a railing?: 2  Basic Mobility Total Score: 17     Treatment & Education:  Pt required mild encouragement to participate  Patient educated on role of therapy, goals of session,  and benefits of out of bed mobility.   Instructed on use of call button and importance of calling nursing staff for assistance with mobility   Questions/concerns addressed within PTA scope of practice  Pt verbalized understanding.    Patient left HOB elevated with all lines intact and call button in reach..    GOALS:   Multidisciplinary Problems       Physical Therapy Goals          Problem: Physical Therapy    Goal Priority Disciplines Outcome Goal Variances Interventions   Physical Therapy Goal     PT, PT/OT Ongoing, Progressing     Description: Goals to be met by: 23     Patient will increase functional independence with mobility by performin. Supine to sit with [place]  2. Sit to stand transfer with Supervision  3. Bed to chair transfer with Supervision using LRAD  4. Gait  x 75 feet with Stand-by Assistance using LRAD  5. Ascend/Descend 6 inch curb step with Stand-by Assistance with/without LRAD  6. Lower extremity exercise program x30 reps per handout, with independence                         Time Tracking:     PT Received On: 23  PT Start Time: 1320     PT Stop Time: 1343  PT Total Time (min): 23 min     Billable Minutes: Gait Training 13 and Therapeutic Activity 10    Treatment Type: Treatment  PT/PTA: PTA     Number of PTA visits since last PT visit: 2023

## 2023-08-17 NOTE — ASSESSMENT & PLAN NOTE
-Last week had CXR with B/L edema, , EF decreased to 40-45%  -Got IV lasix while inpatient and was discharged on 40 po daily however became dehydrated and weak and LVEDP -in the lab showed LVEDP 9, reduce dose to 20 po

## 2023-08-17 NOTE — SUBJECTIVE & OBJECTIVE
Review of Systems   Constitutional: Positive for decreased appetite and malaise/fatigue. Negative for chills and fever.   HENT: Negative.     Cardiovascular:  Positive for chest pain. Negative for leg swelling and palpitations.   Respiratory: Negative.  Negative for cough and shortness of breath.    Musculoskeletal: Negative.    Gastrointestinal:  Positive for nausea and vomiting. Negative for abdominal pain.   Genitourinary: Negative.    Neurological: Negative.      Objective:     Vital Signs (Most Recent):  Temp: 96.5 °F (35.8 °C) (08/17/23 1233)  Pulse: 86 (08/17/23 1233)  Resp: 18 (08/17/23 1233)  BP: 123/65 (08/17/23 1233)  SpO2: 100 % (08/17/23 1130) Vital Signs (24h Range):  Temp:  [96.5 °F (35.8 °C)-98.4 °F (36.9 °C)] 96.5 °F (35.8 °C)  Pulse:  [] 86  Resp:  [14-20] 18  SpO2:  [94 %-100 %] 100 %  BP: (101-165)/(51-82) 123/65     Weight: 56.1 kg (123 lb 10.9 oz)  Body mass index is 21.23 kg/m².     SpO2: 100 %         Intake/Output Summary (Last 24 hours) at 8/17/2023 1250  Last data filed at 8/17/2023 1235  Gross per 24 hour   Intake 593.46 ml   Output --   Net 593.46 ml         Lines/Drains/Airways       Peripheral Intravenous Line  Duration                  Peripheral IV - Single Lumen 08/16/23 0934 20 G;1 3/4 in Left Upper Arm 1 day                       Physical Exam  Vitals and nursing note reviewed.   Constitutional:       General: She is not in acute distress.     Appearance: She is not toxic-appearing or diaphoretic.   HENT:      Head: Normocephalic and atraumatic.      Mouth/Throat:      Mouth: Mucous membranes are moist.      Pharynx: Oropharynx is clear.   Eyes:      Extraocular Movements: Extraocular movements intact.      Conjunctiva/sclera: Conjunctivae normal.   Cardiovascular:      Rate and Rhythm: Normal rate and regular rhythm.   Pulmonary:      Effort: Pulmonary effort is normal. No respiratory distress.      Breath sounds: No wheezing.   Abdominal:      General: There is no  distension.      Palpations: Abdomen is soft.      Tenderness: There is generalized abdominal tenderness. There is no guarding.   Musculoskeletal:         General: No tenderness. Normal range of motion.      Cervical back: Normal range of motion and neck supple.      Right lower leg: No edema.      Left lower leg: No edema.   Skin:     General: Skin is warm and dry.   Neurological:      Mental Status: She is alert and oriented to person, place, and time. Mental status is at baseline.      Cranial Nerves: No dysarthria.   Psychiatric:         Mood and Affect: Mood normal.         Behavior: Behavior normal.            Significant Labs: All pertinent lab results from the last 24 hours have been reviewed.    Significant Imaging:  All relevant imaging reviewed

## 2023-08-17 NOTE — PROVATION PATIENT INSTRUCTIONS
Discharge Summary/Instructions after an Endoscopic Procedure  Patient Name: Mariann Huff  Patient MRN: [id number]  Patient YOB: 1960  Thursday, August 17, 2023  Wayne Adamson MD  Dear patient,  As a result of recent federal legislation (The Federal Cures Act), you may   receive lab or pathology results from your procedure in your [facility]   account before your physician is able to contact you. Your physician or   their representative will relay the results to you with their   recommendations at their soonest availability.  Thank you,  RESTRICTIONS:  During your procedure today, you received medications for sedation.  These   medications may affect your judgment, balance and coordination.  Therefore,   for 24 hours, you have the following restrictions:   - DO NOT drive a car, operate machinery, make legal/financial decisions,   sign important papers or drink alcohol.    ACTIVITY:  Today: no heavy lifting, straining or running due to procedural   sedation/anesthesia.  The following day: return to full activity including work.  DIET:  Eat and drink normally unless instructed otherwise.     TREATMENT FOR COMMON SIDE EFFECTS:  - Mild abdominal pain, nausea, belching, bloating or excessive gas:  rest,   eat lightly and use a heating pad.  - Sore Throat: treat with throat lozenges and/or gargle with warm salt   water.  - Because air was used during the procedure, expelling large amounts of air   from your rectum or belching is normal.  - If a bowel prep was taken, you may not have a bowel movement for 1-3 days.    This is normal.  SYMPTOMS TO WATCH FOR AND REPORT TO YOUR PHYSICIAN:  1. Abdominal pain or bloating, other than gas cramps.  2. Chest pain.  3. Back pain.  4. Signs of infection such as: chills or fever occurring within 24 hours   after the procedure.  5. Rectal bleeding, which would show as bright red, maroon, or black stools.   (A tablespoon of blood from the rectum is not serious, especially if    hemorrhoids are present.)  6. Vomiting.  7. Weakness or dizziness.  GO DIRECTLY TO THE NEAREST EMERGENCY ROOM IF YOU HAVE ANY OF THE FOLLOWING:      Difficulty breathing              Chills and/or fever over 101 F   Persistent vomiting and/or vomiting blood   Severe abdominal pain   Severe chest pain   Black, tarry stools   Bleeding- more than one tablespoon   Any other symptom or condition that you feel may need urgent attention  Your doctor recommends these additional instructions:  If any biopsies were taken, your doctors clinic will contact you in 1 to 2   weeks with any results.  - [facility].   - See further recommendations in EUS report.  For questions, problems or results please call your physician - Wayne Adamson MD at Work:  [phone].  [facility] [place], EMERGENCY ROOM PHONE NUMBER: [phone]  IF A COMPLICATION OR EMERGENCY SITUATION ARISES AND YOU ARE UNABLE TO REACH   YOUR PHYSICIAN - GO DIRECTLY TO THE EMERGENCY ROOM.  Wayne Adamson MD  8/17/2023 10:52:37 AM  This report has been verified and signed electronically.  Dear patient,  As a result of recent federal legislation (The Federal Cures Act), you may   receive lab or pathology results from your procedure in your [facility]   account before your physician is able to contact you. Your physician or   their representative will relay the results to you with their   recommendations at their soonest availability.  Thank you,  PROVATION

## 2023-08-17 NOTE — ASSESSMENT & PLAN NOTE
Patient reports development of acute abdominal pain associated with nausea and vomiting. Patient was recently admitted (6/23) for suspected biliary pancreatitis and biliary stricture treated with biliary sphincterotomy, biliary stent, and cholecystectomy.     - worsening transaminitis w/ elevated TB, Concern for stent occlusion  - Patient amylase 107 wnl, and Lipase 61  -Total abdominal ultrasound and consult to [facility]  - NPO and maintenance fluids pending workup    - See pancreatitis and liver lesion above

## 2023-08-17 NOTE — PROGRESS NOTES
[facility] - [facility] Stepdown  Cardiology  Progress Note    Patient Name: Mariann Huff  MRN: [id number]  Admission Date: 8/14/2023  Hospital Length of Stay: 3 days  Code Status: Full Code   Attending Physician: Wilman Kim MD   Primary Care Physician: Jasbir Haney MD  Expected Discharge Date: 8/18/2023  Principal Problem:STEMI (ST elevation myocardial infarction)    Subjective:     Hospital Course:   Patient presented to the ED with nausea, vomiting, and chest pain. EKG in the ED showed posterior changes consistent with STEMI. Patient was taken to cath lab and her symptoms resolved after PCI. Patient chest pain resolved. Patient reports development of acute abdominal pain associated with nausea and vomiting. Patient was recently admitted (6/23) for suspected biliary pancreatitis and biliary stricture treated with biliary sphincterotomy, biliary stent, and cholecystectomy. Patient now has worsening transaminitis w/ elevated T-Bili.   Concern for stent occlusion. Patient amylase 107 wnl, and Lipase 61. Workup will include total abdominal ultrasound and consult to [facility] NPO diet and maintenance fluids pending workup. Was scheduled for outpatient w/u of hepatic lesions found on previous admission 8/2/23.  Patient had ERCP and EUS on 8/17/23. Visualized a partial occluded stent in the biliary tree. One covered metal stent was placed into the common bile duct. Concern for metastatic cancer is high due to multiple liver lesions and mass on pancreatic head per [facility]. Initially on previous hospital admission, it was believed to more likely be liver abscesses rather than malignancy due to acute nature of progression. Plan to f/u with FNA biopsy cytology results. Discuss with ID plan once cytology results regarding Abx regime, due to the likelihood of this being a malignancy rather than an abscess (pending workup). Consider Heme/onc referral for this patient for work up of possible  malignancy, tumor markers on previous admit were unremarkable (CA 19-9, CEA, AFP).      Review of Systems   Constitutional: Positive for decreased appetite and malaise/fatigue. Negative for chills and fever.   HENT: Negative.     Cardiovascular:  Positive for chest pain. Negative for leg swelling and palpitations.   Respiratory: Negative.  Negative for cough and shortness of breath.    Musculoskeletal: Negative.    Gastrointestinal:  Positive for nausea and vomiting. Negative for abdominal pain.   Genitourinary: Negative.    Neurological: Negative.      Objective:     Vital Signs (Most Recent):  Temp: 96.5 °F (35.8 °C) (08/17/23 1233)  Pulse: 86 (08/17/23 1233)  Resp: 18 (08/17/23 1233)  BP: 123/65 (08/17/23 1233)  SpO2: 100 % (08/17/23 1130) Vital Signs (24h Range):  Temp:  [96.5 °F (35.8 °C)-98.4 °F (36.9 °C)] 96.5 °F (35.8 °C)  Pulse:  [] 86  Resp:  [14-20] 18  SpO2:  [94 %-100 %] 100 %  BP: (101-165)/(51-82) 123/65     Weight: 56.1 kg (123 lb 10.9 oz)  Body mass index is 21.23 kg/m².     SpO2: 100 %         Intake/Output Summary (Last 24 hours) at 8/17/2023 1250  Last data filed at 8/17/2023 1235  Gross per 24 hour   Intake 593.46 ml   Output --   Net 593.46 ml         Lines/Drains/Airways       Peripheral Intravenous Line  Duration                  Peripheral IV - Single Lumen 08/16/23 0934 20 G;1 3/4 in Left Upper Arm 1 day                       Physical Exam  Vitals and nursing note reviewed.   Constitutional:       General: She is not in acute distress.     Appearance: She is not toxic-appearing or diaphoretic.   HENT:      Head: Normocephalic and atraumatic.      Mouth/Throat:      Mouth: Mucous membranes are moist.      Pharynx: Oropharynx is clear.   Eyes:      Extraocular Movements: Extraocular movements intact.      Conjunctiva/sclera: Conjunctivae normal.   Cardiovascular:      Rate and Rhythm: Normal rate and regular rhythm.   Pulmonary:      Effort: Pulmonary effort is normal. No respiratory  distress.      Breath sounds: No wheezing.   Abdominal:      General: There is no distension.      Palpations: Abdomen is soft.      Tenderness: There is generalized abdominal tenderness. There is no guarding.   Musculoskeletal:         General: No tenderness. Normal range of motion.      Cervical back: Normal range of motion and neck supple.      Right lower leg: No edema.      Left lower leg: No edema.   Skin:     General: Skin is warm and dry.   Neurological:      Mental Status: She is alert and oriented to person, place, and time. Mental status is at baseline.      Cranial Nerves: No dysarthria.   Psychiatric:         Mood and Affect: Mood normal.         Behavior: Behavior normal.            Significant Labs: All pertinent lab results from the last 24 hours have been reviewed.    Significant Imaging:  All relevant imaging reviewed    Assessment and Plan:         * STEMI (ST elevation myocardial infarction)  -Started having near syncope around 4 pm  -Bedside echo in the ER showed an EF 45-50% and norma-lateral and infero-lateral hypokinesis  -She underwent Successful primary PCI of ramus intermedius and lateral branch with Pronto thrombectomy and 3X16 GINGER     Plan  - got 750 cc in the lab and additional IVF @ 100 cc/hr x 12 hours, LVEDP 9 and appeared volume down (was discharged on lasix 40 daily for ADHF, might have to discharge home this time on 20 daily lasix)  - Bed rest x 4 hours   - Continue aspirin 81 mg daily, stop after 1 month to avoid triple therapy  - Loaded with brilinta 180, loaded with plavix 600 8/15/23, and 75 qd there after  - Hold Eliquis for a fib, using Lovenox  - Continue high intensity statin therapy (LDL goal < 70)  - TTE shows ejection fraction of about 40% associated with grade II diastolic dysfunction.    - Upon discharge, patient will continue aspirin and plavix  - Consider switching Eliquis to Lovenox due to recurrent hospitalizations that may require acute procedural  interventions      Abdominal pain  Patient reports development of acute abdominal pain associated with nausea and vomiting. Patient was recently admitted (6/23) for suspected biliary pancreatitis and biliary stricture treated with biliary sphincterotomy, biliary stent, and cholecystectomy.     - worsening transaminitis w/ elevated TB, Concern for stent occlusion  - Patient amylase 107 wnl, and Lipase 61  -Total abdominal ultrasound and consult to [facility]  - NPO and maintenance fluids pending workup    - See pancreatitis and liver lesion above    CHF (congestive heart failure)  -Last week had CXR with B/L edema, , EF decreased to 40-45%  -Got IV lasix while inpatient and was discharged on 40 po daily however became dehydrated and weak and LVEDP -in the lab showed LVEDP 9, reduce dose to 20 po      Liver lesion  -s/p biliary pancreatitis s/p sphincterotomy, biliary stent, cholecystectomy in 06/2023  -Imaging revealed liver lesions concerning for abscess  -was seen by ID last admit on 8/7/23 and started on cefpodoxime 400 po bid by Dr Archibald, continue the same  -would need liver lesions biopsy when feasible to r/o malignancy  -will need hepatology and ID f/up post discharge, refer to their notes from last week    - Patient was initially due to f/u Dr. Archibald in clinic on 8/23 due to suspected liver abscesses  - ERCP and EUS 8/17/23, elevated T-bili, elevated alk phos, and transaminitis.   - visualized suspected lesions, FNA with cytology work up  - F/u Cytology workup and heme/onc referral may be required. Previous admit 8/2/23 tumor markers were unremarkable (CA 19-9, CEA, AFP)  - Discuss with ID Abx regime if malignancy is ruled out  - She was escalated to Zosyn due to Leukocytosis, progressively worsening abdominal pain, and tachycardia meeting SIRS criteria. Plan to de escalate pending ID input    Hyperbilirubinemia  -see pancreatitis     History of pancreatitis  Recent (6/2023)  suspected biliary pancreatitis and biliary stricture treated with biliary sphincterotomy, biliary stent, and cholecystectomy.  CT A/P with new innumerable hepatic lesions:  1. Innumerable low attenuating hepatic lesions, new since the previous exam.  Malignancy remains a concern however given short-term development, correlation is needed to exclude multifocal infection/abscess in this patient status post bile duct stent placement and cholecystectomy.  Please note, there is a low attenuating focus within the gallbladder fossa, similar to the foci throughout the hepatic parenchyma..  2. Pancreatic ductal dilatation up to 4 mm, minimally increased compared to prior.  There is fullness of the pancreatic head, underlying mass is not excluded, correlation with recent ERCP advised.  3. Simple and complex bilateral renal cysts.  4. Biliary stent in place with expected pneumobilia.  5. Cholecystectomy with scattered fluid about the gallbladder fossa.  6. Moderate stool throughout the colon, may reflect developing constipation.  7.  Additional findings as above.     - concern for lesions seen on CT A/P  - hepatology consulted last admit, appreciate assistance  - Patient was found to have a hypoechoic pancreatic mass on abdominal US  - Was also found to have this mass on EUS concerning for malignancy   - EUS and ERCP 8/17/23, found to have a partially occluded stent. Bare metal stent placed in CBD  -  Multiple lesions seen on previous admit have been visualized. Per [facility] more concerning for metastatic cancer  - FNA biopsy of lesions, F/U with cytology results   - Patient is on a clear liquid diet with 150ml/hr of maintenance fluids  - Discuss with ID Abx regime pending cytology results of FNA. Malignancy vs. Abscesses       Type 2 diabetes mellitus with stage 2 chronic kidney disease and hypertension  Home Antihyperglycemic Regiment:  -Farxiga, glipizide, insulin daily at home  - Titrate and addition of insulin as needed for BG  goal 140-180  - SSI with POCT accuchecks ACHS and Diabetic diet 2000 beti  - Diabetic nutritional counseling given   - Continue home gabapentin for diabetic peripheral neuropathy  - Appreciate Endocrinology recs  - Patient NPO due to generalized abdominal pain and concern for biliary stent occlusion   - Adjust insulin and monitor closely  - Patient on clear liquid diet s/p EUS and ERCP. Continue to monitor glucose levels and titrate glucose for BG goal of 140-180      Sleep apnea  - CPAP QHS        VTE Risk Mitigation (From admission, onward)           Ordered     enoxaparin injection 60 mg  Every 12 hours         08/15/23 1044                    Jim Nicholson DO  Cardiology  [facility] - Cardiology Stepdown

## 2023-08-17 NOTE — PLAN OF CARE
Problem: Occupational Therapy  Goal: Occupational Therapy Goal  Description: Goals to be met by: 9/17/23 (1 month)     Patient will increase functional independence with ADLs by performing:    UE Dressing with Supervision.  LE Dressing with Supervision.  Grooming while standing at sink with Modified [place].  Toileting from toilet with Modified [place] for hygiene and clothing management.   Step transfer with Supervision  Toilet transfer to toilet with Stand-by Assistance.    Evaluated pt and established OT POC. Continue OT as tolerated.  Ayesha Myers OT  8/17/2023      Outcome: Ongoing, Progressing

## 2023-08-17 NOTE — PT/OT/SLP EVAL
Occupational Therapy   Evaluation and Co-Treatment  Co-treatment with PTA for maximal pt participation, safety, and activity tolerance       Name: Mariann Huff  MRN: [id number]  Admitting Diagnosis: STEMI (ST elevation myocardial infarction)  Recent Surgery: Procedure(s) (LRB):  ERCP (ENDOSCOPIC RETROGRADE CHOLANGIOPANCREATOGRAPHY) (N/A)  ULTRASOUND, UPPER GI TRACT, ENDOSCOPIC Day of Surgery    Recommendations:     Discharge Recommendations: other (see comments), home health OT  Discharge Equipment Recommendations:  walker, rolling  Barriers to discharge:       Assessment:     Mariann Huff is a 63 y.o. female with a medical diagnosis of STEMI (ST elevation myocardial infarction).  She presents with impaired ADL and mobility performance deficits. Pt found upright in bed and agreeable for therapy. Pt session focused on bedroom,bathroom, and hallway mobility. Pt was min A for bed mobility and CGA for gait using RW. Pt tolerated standing ~4 minutes at sink. Pt would benefit from continued OT skilled services 3x/wk to improve daily living skills to optimize QOL. Pt is recommended to discharge to other at this time. Performance deficits affecting function: weakness, impaired functional mobility, impaired endurance, gait instability, impaired cardiopulmonary response to activity, impaired self care skills, impaired balance, pain, decreased upper extremity function, decreased lower extremity function.      Rehab Prognosis: Good; patient would benefit from acute skilled OT services to address these deficits and reach maximum level of function.       Plan:     Patient to be seen 3 x/week to address the above listed problems via self-care/home management, therapeutic activities, therapeutic exercises  Plan of Care Expires:    Plan of Care Reviewed with: patient    Subjective     Chief Complaint: recently having procedure (ERCP)  Patient/Family Comments/goals: return home     Occupational Profile:  Living Environment: Pt  lives with  in a SSH with threshold entry  Previous level of function: SPC prn  Roles and Routines: unknown  Equipment Used at Home: cane, straight, bedside commode, other (see comments) (transport chair)  Assistance upon Discharge: spouse    Pain/Comfort:  Pain Rating 1: other (see comments) (L shoulder pain)  Location - Side 1: Left  Location - Orientation 1: generalized  Location 1: leg  Pain Addressed 1: Reposition, Distraction    Patients cultural, spiritual, Zoroastrianism conflicts given the current situation: no    Objective:     Communicated with: RN prior to session.  Patient found HOB elevated with telemetry, peripheral IV upon OT entry to room.    General Precautions: Standard, fall  Orthopedic Precautions: N/A  Braces: N/A  Respiratory Status: Room air    Occupational Performance:    Bed Mobility:    Patient completed Rolling/Turning to Right with minimum assistance  Patient completed Scooting/Bridging with minimum assistance  Patient completed Supine to Sit with minimum assistance    Functional Mobility/Transfers:  Patient completed Sit <> Stand Transfer with contact guard assistance  with  rolling walker   Functional Mobility: Pt stood and mobilized into restroom for standing ADLs at sink. SBA at sink. Pt mobilized in hallway ~80ft using RW with CGA     Activities of Daily Living:  Grooming: stand by assistance washing face and brushing teeth standing at sink  Upper Body Dressing: minimum assistance donning gown at EOB     Cognitive/Visual Perceptual:  Cognitive/Psychosocial Skills:     -       Oriented to: Person, Place, Time, and Situation   -       Follows Commands/attention:Follows multistep  commands  -       Communication: clear/fluent  -       Memory: No Deficits noted  -       Safety awareness/insight to disability: intact   -       Mood/Affect/Coping skills/emotional control: Pleasant    Physical Exam:  Balance:    -       demo good sitting and standing balance using a RW   Upper Extremity  Range of Motion:     -       Right Upper Extremity: WFL  -       Left Upper Extremity: WFL  Upper Extremity Strength:    -       Right Upper Extremity: WFL  -       Left Upper Extremity: WFL   Strength:    -       Right Upper Extremity: WFL  -       Left Upper Extremity: WFL    AMPAC 6 Click ADL:  AMPAC Total Score: 19    Treatment & Education:  Pt educated on role of occupational therapy, POC, and safety during ADLs and functional mobility. Pt and OT discussed importance of safe, continued mobility to optimize daily living skills. Pt verbalized understanding.     White board updated during session. Pt given instruction to call for medical staff/nurse for assistance.       Patient left up in chair with all lines intact, call button in reach, and RN notified    GOALS:   Multidisciplinary Problems       Occupational Therapy Goals          Problem: Occupational Therapy    Goal Priority Disciplines Outcome Interventions   Occupational Therapy Goal     OT, PT/OT Ongoing, Progressing    Description: Goals to be met by: 9/17/23 (1 month)     Patient will increase functional independence with ADLs by performing:    UE Dressing with Supervision.  LE Dressing with Supervision.  Grooming while standing at sink with Modified [place].  Toileting from toilet with Modified [place] for hygiene and clothing management.   Step transfer with Supervision  Toilet transfer to toilet with Stand-by Assistance.                         History:     Past Medical History:   Diagnosis Date    Anticoagulant long-term use     Asthma     Back pain     Bradycardia, unspecified 05/08/2019    The etiology of the bradycardic episode is unclear.  The have appear to be respiratory in origin (at least the 1st episode).  We will continue to monitor carefully.  We are awaiting evaluation by Cardiology.      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, with long-term current use of insulin 2020         Past Surgical History:   Procedure Laterality Date    ANGIOGRAM, CORONARY, WITH LEFT HEART CATHETERIZATION N/A 8/3/2023    Procedure: Angiogram, Coronary, with Left Heart Cath;  Surgeon: Aime George MD;  Location: [facility] CATH LAB;  Service: Cardiology;  Laterality: N/A;    ANGIOGRAM, CORONARY, WITH LEFT HEART CATHETERIZATION N/A 2023    Procedure: Angiogram, Coronary, with Left Heart Cath;  Surgeon: Wilman Kim MD;  Location: [facility] CATH LAB;  Service: Cardiology;  Laterality: N/A;    ANTEGRADE NEPHROSTOGRAPHY Left 2019    Procedure: Nephrostogram - antegrade;  Surgeon: Robin Boyd MD;  [facility];  Service: Urology;  Laterality: Left;    BRONCHOSCOPY N/A 2019    Procedure: BRONCHOSCOPY;  Surgeon: Sean Ruano MD;  [facility];  Service: General;  Laterality: N/A;    CARDIAC CATHETERIZATION      CATARACT EXTRACTION      cataract extraction left eye      cataracts      CAUDAL EPIDURAL STEROID INJECTION N/A 2019    Procedure: Injection-steroid-epidural-caudal;  Surgeon: Dave Bentley Jr., MD;  Location: [facility] PAIN MGT;  Service: Pain Management;  Laterality: N/A;     SECTION, LOW TRANSVERSE      COLONOSCOPY N/A 2019    Procedure: COLONOSCOPY;  Surgeon: Al Alaniz MD;  Location: [facility] ENDO (2ND FLR);  Service: Endoscopy;  Laterality: N/A;    CORONARY ANGIOPLASTY      CYSTOGRAM  N/A 12/11/2019    Procedure: CYSTOGRAM INTRAOP;  Surgeon: Robin Boyd MD;  Location: [facility] OR 2ND FLR;  Service: Urology;  Laterality: N/A;  1 HOUR    CYSTOSCOPY W/ RETROGRADES Left 12/11/2019    Procedure: CYSTOSCOPY, WITH RETROGRADE PYELOGRAM;  Surgeon: Robin Boyd MD;  [facility];  Service: Urology;  Laterality: Left;  fluro    ENDOSCOPIC ULTRASOUND OF UPPER GASTROINTESTINAL TRACT N/A 6/15/2023    Procedure: ULTRASOUND, UPPER GI TRACT, ENDOSCOPIC;  Surgeon: Lisa Kim MD;  Location: [facility] ENDO (2ND FLR);  Service: Endoscopy;  Laterality: N/A;    ERCP N/A 6/15/2023    Procedure: ERCP (ENDOSCOPIC RETROGRADE CHOLANGIOPANCREATOGRAPHY);  Surgeon: Lisa Kim MD;  Location: [facility] ENDO (2ND FLR);  Service: Endoscopy;  Laterality: N/A;    ESOPHAGOGASTRODUODENOSCOPY W/ PEG  5/2/2019    Procedure: EGD, WITH PEG TUBE INSERTION;  Surgeon: Sean Ruano MD;  Location: [facility] OR 2ND FLR;  Service: General;;    EXCISION TURBINATE, SUBMUCOUS      FLEXIBLE SIGMOIDOSCOPY N/A 5/13/2019    Procedure: SIGMOIDOSCOPY, FLEXIBLE;  Surgeon: ALBERTO Amin MD;  Location: [facility] ENDO (2ND FLR);  Service: Endoscopy;  Laterality: N/A;    FLEXIBLE SIGMOIDOSCOPY N/A 5/21/2019    Procedure: SIGMOIDOSCOPY, FLEXIBLE;  Surgeon: ALBERTO Amin MD;  Location: [facility] ENDO (2ND FLR);  Service: Endoscopy;  Laterality: N/A;    FUSION OF LUMBAR SPINE BY ANTERIOR APPROACH Left 4/12/2019    Procedure: FUSION, SPINE, LUMBAR, ANTERIOR APPROACH Left L5-S1 Anterior to Psoa Interbody Fusion, L5-S1 Posterior Instrumentation;  Surgeon: Mk George MD;  Location: [facility] OR 2ND FLR;  Service: Neurosurgery;  Laterality: Left;  Porcedure:  Left L5-S1 Anterior to Psoa Interbody Fusion, L5-S1 Posterior Instrumentation  Surgery Time: 4 Hrs  LOS: 2-3  Anesthesia: General   Blood: Type & Screen  R    HAND SURGERY Left     HAND SURGERY Right     torn ligament repair/ Dr. Yeboah    HYSTERECTOMY      INJECTION OF STEROID Right 12/10/2020     Procedure: INJECTION, STEROID Right SI Joint Block and Steroid Injection;  Surgeon: Mk George MD;  Location: [facility] OR;  Service: Neurosurgery;  Laterality: Right;  Procedure: Right SI Joint Block and Steroid Injection   SUrgery Time: 30 Min  LOS: 0  Anesthesia: MAC  Radiology: C-arm  Bed: Tomer 4 Poster  Position: Prone    INJECTION OF STEROID Right 9/28/2021    Procedure: INJECTION, STEROID Right SI joint block & steroid injection;  Surgeon: Mk George MD;  Location: [facility] OR;  Service: Neurosurgery;  Laterality: Right;  Procedure: Right SI joint block & steroid injection  Surgery Time: 30m  Anesthesia: Local MAC  Radiology: C-arm  Bed: Regular  Position: Prone    IVUS, CORONARY  8/14/2023    Procedure: IVUS, Coronary;  Surgeon: Wilman Kim MD;  Location: [facility] CATH LAB;  Service: Cardiology;;    LAPAROSCOPIC CHOLECYSTECTOMY N/A 6/23/2023    Procedure: CHOLECYSTECTOMY, LAPAROSCOPIC;  Surgeon: Richard Penny MD;  Location: [address];  Service: General;  Laterality: N/A;    left foot surgery      left wrist surgery      LYSIS OF ADHESIONS N/A 2/19/2020    Procedure: LYSIS, ADHESIONS;  Surgeon: Robin Boyd MD;  [address]  Service: Urology;  Laterality: N/A;    NASAL SEPTUM SURGERY  5/7/15    OPEN REDUCTION AND INTERNAL FIXATION (ORIF) OF FRACTURE OF PROXIMAL HUMERUS Left 7/12/2023    Procedure: ORIF, FRACTURE, HUMERUS, PROXIMAL ;  Surgeon: Tomasz Leary MD;  Location: [address];  Service: Orthopedics;  Laterality: Left;    PERCUTANEOUS NEPHROSTOMY Left 4/21/2019    Procedure: Creation, Nephrostomy, Percutaneous;  Surgeon: Karina Surgeon;  Location: [facility];  Service: Anesthesiology;  Laterality: Left;    REPAIR OF URETER  4/12/2019    Procedure: REPAIR, URETER;  Surgeon: Mk George MD;  Location: [address];  Service: Neurosurgery;;    REPLACEMENT OF NEPHROSTOMY TUBE N/A 7/18/2019    Procedure: REPLACEMENT, NEPHROSTOMY TUBE;  Surgeon: Destin  Diagnostic Provider;  [address]  Service: Anesthesiology;  Laterality: N/A;  188    REPLACEMENT OF NEPHROSTOMY TUBE N/A 7/24/2019    Procedure: REPLACEMENT, NEPHROSTOMY TUBE;  Surgeon: [facility] Diagnostic Provider;  [facility];  Service: Anesthesiology;  Laterality: N/A;  188    REPLACEMENT OF NEPHROSTOMY TUBE N/A 10/7/2019    Procedure: REPLACEMENT, NEPHROSTOMY TUBE;  Surgeon: [facility] Diagnostic Provider;  [facility];  Service: Anesthesiology;  Laterality: N/A;  189    REPLACEMENT OF NEPHROSTOMY TUBE N/A 11/25/2019    Procedure: REPLACEMENT, NEPHROSTOMY TUBE;  Surgeon: [facility] Diagnostic Provider;  [facility];  Service: Anesthesiology;  Laterality: N/A;  Room 188/Bessy    REPLACEMENT OF NEPHROSTOMY TUBE Right 2/19/2020    Procedure: REPLACEMENT, NEPHROSTOMY TUBE removal removal;  Surgeon: Robin Boyd MD;  Location: [address];  Service: Urology;  Laterality: Right;    RIGHT HEART CATHETERIZATION Right 8/3/2023    Procedure: INSERTION, CATHETER, RIGHT HEART;  Surgeon: Aime George MD;  Location: [facility] CATH LAB;  Service: Cardiology;  Laterality: Right;    rt elbow surgery      S/P LAD COATED STENT  05/14/2010    6 total     S/P OM1 STENT  08/2003    SINUS SURGERY      F.E.S.S.    STENT, DRUG ELUTING, SINGLE VESSEL, CORONARY  8/14/2023    Procedure: Stent, Drug Eluting, Single Vessel, Coronary;  Surgeon: Wilman Kim MD;  Location: [facility] CATH LAB;  Service: Cardiology;;    TRACHEOSTOMY N/A 5/2/2019    Procedure: CREATION, TRACHEOSTOMY;  Surgeon: Sean Ruano MD;  Location: [address];  Service: General;  Laterality: N/A;    TUBAL LIGATION      URETERAL REIMPLANTION Left 2/19/2020    Procedure: REIMPLANTATION, URETER WITH PSOAS HITCH;  Surgeon: Robni Boyd MD;  Location: [address];  Service: Urology;  Laterality: Left;    VENTRICULOGRAM, LEFT  8/3/2023    Procedure: Ventriculogram, Left;  Surgeon: Aime George MD;   Location: [facility] CATH LAB;  Service: Cardiology;;       Time Tracking:     OT Date of Treatment: 08/17/23  OT Start Time: 1320  OT Stop Time: 1343  OT Total Time (min): 23 min    Billable Minutes:Evaluation 10 min  Self Care/Home Management 13 min    8/17/2023

## 2023-08-17 NOTE — ASSESSMENT & PLAN NOTE
Recent (6/2023) suspected biliary pancreatitis and biliary stricture treated with biliary sphincterotomy, biliary stent, and cholecystectomy.  CT A/P with new innumerable hepatic lesions:  1. Innumerable low attenuating hepatic lesions, new since the previous exam.  Malignancy remains a concern however given short-term development, correlation is needed to exclude multifocal infection/abscess in this patient status post bile duct stent placement and cholecystectomy.  Please note, there is a low attenuating focus within the gallbladder fossa, similar to the foci throughout the hepatic parenchyma..  2. Pancreatic ductal dilatation up to 4 mm, minimally increased compared to prior.  There is fullness of the pancreatic head, underlying mass is not excluded, correlation with recent ERCP advised.  3. Simple and complex bilateral renal cysts.  4. Biliary stent in place with expected pneumobilia.  5. Cholecystectomy with scattered fluid about the gallbladder fossa.  6. Moderate stool throughout the colon, may reflect developing constipation.  7.  Additional findings as above.     - concern for lesions seen on CT A/P  - hepatology consulted last admit, appreciate assistance  - Patient was found to have a hypoechoic pancreatic mass on abdominal US  - Was also found to have this mass on EUS concerning for malignancy   - EUS and ERCP 8/17/23, found to have a partially occluded stent. Bare metal stent placed in CBD  -  Multiple lesions seen on previous admit have been visualized. Per [facility] more concerning for metastatic cancer  - FNA biopsy of lesions, F/U with cytology results   - Patient is on a clear liquid diet with 150ml/hr of maintenance fluids  - Discuss with ID Abx regime pending cytology results of FNA. Malignancy vs. Abscesses

## 2023-08-17 NOTE — TREATMENT PLAN
Post-Procedure Gastroenterology Treatment Plan:     Mariann Huff is status post EUS/ERCP with the following findings:     EUS:   - Multiple metastatic lesions were found in the visualized portion of the liver. Fine needle aspiration performed.   - One stent was visualized endosonographically in the common bile duct.   - A mass was identified in the pancreatic head.   - EUS findings suggestive of metastatic pancreatic cancer.    ERCP:   - Prior biliary sphincterotomy appeared open.   - One partially occluded stent from the biliary tree was seen in the major papilla.   - A single moderate biliary stricture was found in the lower third of the main bile duct.   - One stent was removed from the biliary tree.   - One covered metal stent was placed into the common bile duct.     Our recommendations are as follows:     -Okay to resume PO intake from [facility] standpoint.   -Follow-up cytology results.   -Referral to medical oncology.   -CMP daily.         Vijay Bonilla MD, PGY-VI  Gastroenterology Fellow  [facility]

## 2023-08-17 NOTE — ASSESSMENT & PLAN NOTE
This is a 63 year old female with a PMH significant for CAD (status post PCI in 2014), DVT (on Apixaban), MURTAZA, pancreatitis (diagnosed 05/2023; attributed to TRULICITY usage initially; status post EUS/ERCP in 06/2023 showing gallbladder sludge and stricture in lower third of main duct suspected to be from pancreatitis with plastic stent placed into CBD; due for removal in 08/2023), peripheral artery disease (on PLETAL), and T2DM and a PSH significant for subtotal cholecystectomy (late 06/2023) who was admitted to [facility] on 08/14 for posterior STEMI requiring PCI on admission and initiation of ASA and PLAVIX. Hospital course associated with onset of abdominal pain and progressive hyperbilirubinemia since 08/16 with CT A/P non-contrast from that time concerning for pancreatic head mass with multiple hepatic lesions concerning for underlying pancreatic cancer.     Recommendations:     -Tentative plan for ERCP for stent exchange today with risk/benefit discussion prior to procedure regarding risk of bleeding with procedure and possible pancreatic lesion biopsy while on DAPT.   -Keep NPO for now.

## 2023-08-17 NOTE — ASSESSMENT & PLAN NOTE
-s/p biliary pancreatitis s/p sphincterotomy, biliary stent, cholecystectomy in 06/2023  -Imaging revealed liver lesions concerning for abscess  -was seen by ID last admit on 8/7/23 and started on cefpodoxime 400 po bid by Dr Archibald, continue the same  -would need liver lesions biopsy when feasible to r/o malignancy  -will need hepatology and ID f/up post discharge, refer to their notes from last week    - Patient was initially due to f/u Dr. Archibald in clinic on 8/23 due to suspected liver abscesses  - ERCP and EUS 8/17/23, elevated T-bili, elevated alk phos, and transaminitis.   - visualized suspected lesions, FNA with cytology work up  - F/u Cytology workup and heme/onc referral may be required. Previous admit 8/2/23 tumor markers were unremarkable (CA 19-9, CEA, AFP)  - Discuss with ID Abx regime if malignancy is ruled out

## 2023-08-17 NOTE — TRANSFER OF CARE
"Anesthesia Transfer of Care Note    Patient: Mariann Huff    Procedure(s) Performed: Procedure(s) (LRB):  ERCP (ENDOSCOPIC RETROGRADE CHOLANGIOPANCREATOGRAPHY) (N/A)    Patient location: PACU    Anesthesia Type: general    Transport from OR: Transported from OR on 6-10 L/min O2 by face mask with adequate spontaneous ventilation    Post pain: adequate analgesia    Post assessment: no apparent anesthetic complications    Post vital signs: stable    Level of consciousness: responds to stimulation    Nausea/Vomiting: no nausea/vomiting    Complications: none    Transfer of care protocol was followed      Last vitals:   Visit Vitals  BP (!) 143/71 (BP Location: Right arm, Patient Position: Sitting)   Pulse 101   Temp 36.7 °C (98 °F) (Temporal)   Resp 15   Ht 5' 4" (1.626 m)   Wt 56.1 kg (123 lb 10.9 oz)   LMP  (LMP Unknown)   SpO2 100%   BMI 21.23 kg/m²     "

## 2023-08-17 NOTE — ANESTHESIA PROCEDURE NOTES
Intubation    Date/Time: 8/17/2023 9:43 AM    Performed by: Barb Kim CRNA  Authorized by: Naila Smith MD    Intubation:     Induction:  Intravenous    Intubated:  Postinduction    Mask Ventilation:  Not attempted    Attempts:  1    Attempted By:  CRNA    Method of Intubation:  Video laryngoscopy    Blade:  Schmidt 3    Laryngeal View Grade: Grade IIA - cords partially seen      Difficult Airway Encountered?: No      Complications:  None    Airway Device:  Oral endotracheal tube    Airway Device Size:  7.5    Style/Cuff Inflation:  Cuffed (inflated to minimal occlusive pressure)    Inflation Amount (mL):  8    Tube secured:  21    Secured at:  The teeth    Placement Verified By:  Capnometry    Complicating Factors:  None    Findings Post-Intubation:  BS equal bilateral and atraumatic/condition of teeth unchanged

## 2023-08-17 NOTE — PROVATION PATIENT INSTRUCTIONS
Discharge Summary/Instructions after an Endoscopic Procedure  Patient Name: Mariann Huff  Patient MRN: [id number]  Patient YOB: 1960  Thursday, August 17, 2023  Wayne Adamson MD  Dear patient,  As a result of recent federal legislation (The Federal Cures Act), you may   receive lab or pathology results from your procedure in your [facility]   account before your physician is able to contact you. Your physician or   their representative will relay the results to you with their   recommendations at their soonest availability.  Thank you,  RESTRICTIONS:  During your procedure today, you received medications for sedation.  These   medications may affect your judgment, balance and coordination.  Therefore,   for 24 hours, you have the following restrictions:   - DO NOT drive a car, operate machinery, make legal/financial decisions,   sign important papers or drink alcohol.    ACTIVITY:  Today: no heavy lifting, straining or running due to procedural   sedation/anesthesia.  The following day: return to full activity including work.  DIET:  Eat and drink normally unless instructed otherwise.     TREATMENT FOR COMMON SIDE EFFECTS:  - Mild abdominal pain, nausea, belching, bloating or excessive gas:  rest,   eat lightly and use a heating pad.  - Sore Throat: treat with throat lozenges and/or gargle with warm salt   water.  - Because air was used during the procedure, expelling large amounts of air   from your rectum or belching is normal.  - If a bowel prep was taken, you may not have a bowel movement for 1-3 days.    This is normal.  SYMPTOMS TO WATCH FOR AND REPORT TO YOUR PHYSICIAN:  1. Abdominal pain or bloating, other than gas cramps.  2. Chest pain.  3. Back pain.  4. Signs of infection such as: chills or fever occurring within 24 hours   after the procedure.  5. Rectal bleeding, which would show as bright red, maroon, or black stools.   (A tablespoon of blood from the rectum is not serious, especially if    hemorrhoids are present.)  6. Vomiting.  7. Weakness or dizziness.  GO DIRECTLY TO THE NEAREST EMERGENCY ROOM IF YOU HAVE ANY OF THE FOLLOWING:      Difficulty breathing              Chills and/or fever over 101 F   Persistent vomiting and/or vomiting blood   Severe abdominal pain   Severe chest pain   Black, tarry stools   Bleeding- more than one tablespoon   Any other symptom or condition that you feel may need urgent attention  Your doctor recommends these additional instructions:  If any biopsies were taken, your doctors clinic will contact you in 1 to 2   weeks with any results.  - [facility].   - Await cytology results. Recommend oncology consultation.  - ERCP today.  For questions, problems or results please call your physician - Wayne Adamson MD at Work:  [phone].  [facility] [place], EMERGENCY ROOM PHONE NUMBER: [phone]  IF A COMPLICATION OR EMERGENCY SITUATION ARISES AND YOU ARE UNABLE TO REACH   YOUR PHYSICIAN - GO DIRECTLY TO THE EMERGENCY ROOM.  Wayne Adamson MD  8/17/2023 10:41:51 AM  This report has been verified and signed electronically.  Dear patient,  As a result of recent federal legislation (The Federal Cures Act), you may   receive lab or pathology results from your procedure in your [facility]   account before your physician is able to contact you. Your physician or   their representative will relay the results to you with their   recommendations at their soonest availability.  Thank you,  PROVATION

## 2023-08-17 NOTE — PLAN OF CARE
Patient was seen at bedside this morning. Plan for ERCP with Dr. Adamson. Recommend continuing with procedure without further cardiologic testing. Patient s/p posterior STEMI PCI with GINGER. Patient revascularized with no complications.

## 2023-08-18 PROBLEM — K83.1 BILIARY OBSTRUCTION: Status: ACTIVE | Noted: 2023-08-18

## 2023-08-18 LAB
ABO + RH BLD: NORMAL
ALBUMIN SERPL BCP-MCNC: 1.8 G/DL (ref 3.5–5.2)
ALBUMIN SERPL BCP-MCNC: 1.8 G/DL (ref 3.5–5.2)
ALP SERPL-CCNC: 837 U/L (ref 55–135)
ALP SERPL-CCNC: 843 U/L (ref 55–135)
ALT SERPL W/O P-5'-P-CCNC: 62 U/L (ref 10–44)
ALT SERPL W/O P-5'-P-CCNC: 62 U/L (ref 10–44)
ANION GAP SERPL CALC-SCNC: 13 MMOL/L (ref 8–16)
APTT PPP: 39.6 SEC (ref 21–32)
AST SERPL-CCNC: 108 U/L (ref 10–40)
AST SERPL-CCNC: 110 U/L (ref 10–40)
BASOPHILS # BLD AUTO: 0.01 K/UL (ref 0–0.2)
BASOPHILS # BLD AUTO: 0.01 K/UL (ref 0–0.2)
BASOPHILS NFR BLD: 0.1 % (ref 0–1.9)
BASOPHILS NFR BLD: 0.1 % (ref 0–1.9)
BILIRUB DIRECT SERPL-MCNC: 2.7 MG/DL (ref 0.1–0.3)
BILIRUB SERPL-MCNC: 3.5 MG/DL (ref 0.1–1)
BILIRUB SERPL-MCNC: 3.5 MG/DL (ref 0.1–1)
BLD GP AB SCN CELLS X3 SERPL QL: NORMAL
BUN SERPL-MCNC: 12 MG/DL (ref 8–23)
CALCIUM SERPL-MCNC: 8 MG/DL (ref 8.7–10.5)
CHLORIDE SERPL-SCNC: 106 MMOL/L (ref 95–110)
CO2 SERPL-SCNC: 22 MMOL/L (ref 23–29)
CREAT SERPL-MCNC: 0.7 MG/DL (ref 0.5–1.4)
DIFFERENTIAL METHOD: ABNORMAL
DIFFERENTIAL METHOD: ABNORMAL
EOSINOPHIL # BLD AUTO: 0 K/UL (ref 0–0.5)
EOSINOPHIL # BLD AUTO: 0 K/UL (ref 0–0.5)
EOSINOPHIL NFR BLD: 0.1 % (ref 0–8)
EOSINOPHIL NFR BLD: 0.2 % (ref 0–8)
ERYTHROCYTE [DISTWIDTH] IN BLOOD BY AUTOMATED COUNT: 16.4 % (ref 11.5–14.5)
ERYTHROCYTE [DISTWIDTH] IN BLOOD BY AUTOMATED COUNT: 16.5 % (ref 11.5–14.5)
EST. GFR  (NO RACE VARIABLE): >60 ML/MIN/1.73 M^2
GLUCOSE SERPL-MCNC: 138 MG/DL (ref 70–110)
HCT VFR BLD AUTO: 21.2 % (ref 37–48.5)
HCT VFR BLD AUTO: 27.8 % (ref 37–48.5)
HGB BLD-MCNC: 6.3 G/DL (ref 12–16)
HGB BLD-MCNC: 8.4 G/DL (ref 12–16)
IMM GRANULOCYTES # BLD AUTO: 0.04 K/UL (ref 0–0.04)
IMM GRANULOCYTES # BLD AUTO: 0.12 K/UL (ref 0–0.04)
IMM GRANULOCYTES NFR BLD AUTO: 0.3 % (ref 0–0.5)
IMM GRANULOCYTES NFR BLD AUTO: 0.8 % (ref 0–0.5)
INR PPP: 1.2 (ref 0.8–1.2)
LYMPHOCYTES # BLD AUTO: 0.8 K/UL (ref 1–4.8)
LYMPHOCYTES # BLD AUTO: 1.1 K/UL (ref 1–4.8)
LYMPHOCYTES NFR BLD: 6.6 % (ref 18–48)
LYMPHOCYTES NFR BLD: 7 % (ref 18–48)
MAGNESIUM SERPL-MCNC: 2.5 MG/DL (ref 1.6–2.6)
MCH RBC QN AUTO: 24.9 PG (ref 27–31)
MCH RBC QN AUTO: 25.2 PG (ref 27–31)
MCHC RBC AUTO-ENTMCNC: 29.7 G/DL (ref 32–36)
MCHC RBC AUTO-ENTMCNC: 30.2 G/DL (ref 32–36)
MCV RBC AUTO: 82 FL (ref 82–98)
MCV RBC AUTO: 85 FL (ref 82–98)
MONOCYTES # BLD AUTO: 0.7 K/UL (ref 0.3–1)
MONOCYTES # BLD AUTO: 0.7 K/UL (ref 0.3–1)
MONOCYTES NFR BLD: 4.9 % (ref 4–15)
MONOCYTES NFR BLD: 5.6 % (ref 4–15)
NEUTROPHILS # BLD AUTO: 11 K/UL (ref 1.8–7.7)
NEUTROPHILS # BLD AUTO: 13.1 K/UL (ref 1.8–7.7)
NEUTROPHILS NFR BLD: 87 % (ref 38–73)
NEUTROPHILS NFR BLD: 87.3 % (ref 38–73)
NRBC BLD-RTO: 0 /100 WBC
NRBC BLD-RTO: 0 /100 WBC
PLATELET # BLD AUTO: 211 K/UL (ref 150–450)
PLATELET # BLD AUTO: 291 K/UL (ref 150–450)
PMV BLD AUTO: 12.4 FL (ref 9.2–12.9)
PMV BLD AUTO: 12.8 FL (ref 9.2–12.9)
POCT GLUCOSE: 121 MG/DL (ref 70–110)
POCT GLUCOSE: 137 MG/DL (ref 70–110)
POCT GLUCOSE: 153 MG/DL (ref 70–110)
POCT GLUCOSE: 93 MG/DL (ref 70–110)
POTASSIUM SERPL-SCNC: 3.8 MMOL/L (ref 3.5–5.1)
PROT SERPL-MCNC: 6.9 G/DL (ref 6–8.4)
PROT SERPL-MCNC: 6.9 G/DL (ref 6–8.4)
PROTHROMBIN TIME: 13 SEC (ref 9–12.5)
RBC # BLD AUTO: 2.5 M/UL (ref 4–5.4)
RBC # BLD AUTO: 3.38 M/UL (ref 4–5.4)
SODIUM SERPL-SCNC: 141 MMOL/L (ref 136–145)
SPECIMEN OUTDATE: NORMAL
WBC # BLD AUTO: 12.59 K/UL (ref 3.9–12.7)
WBC # BLD AUTO: 15.05 K/UL (ref 3.9–12.7)

## 2023-08-18 PROCEDURE — 25000003 PHARM REV CODE 250: Performed by: INTERNAL MEDICINE

## 2023-08-18 PROCEDURE — 25000003 PHARM REV CODE 250: Performed by: NURSE PRACTITIONER

## 2023-08-18 PROCEDURE — 63600175 PHARM REV CODE 636 W HCPCS: Performed by: STUDENT IN AN ORGANIZED HEALTH CARE EDUCATION/TRAINING PROGRAM

## 2023-08-18 PROCEDURE — 83735 ASSAY OF MAGNESIUM: CPT | Performed by: INTERNAL MEDICINE

## 2023-08-18 PROCEDURE — 99900035 HC TECH TIME PER 15 MIN (STAT)

## 2023-08-18 PROCEDURE — 80053 COMPREHEN METABOLIC PANEL: CPT

## 2023-08-18 PROCEDURE — 63600175 PHARM REV CODE 636 W HCPCS: Performed by: NURSE PRACTITIONER

## 2023-08-18 PROCEDURE — 94660 CPAP INITIATION&MGMT: CPT

## 2023-08-18 PROCEDURE — 80076 HEPATIC FUNCTION PANEL: CPT | Performed by: INTERNAL MEDICINE

## 2023-08-18 PROCEDURE — 25000003 PHARM REV CODE 250

## 2023-08-18 PROCEDURE — 86900 BLOOD TYPING SEROLOGIC ABO: CPT | Performed by: STUDENT IN AN ORGANIZED HEALTH CARE EDUCATION/TRAINING PROGRAM

## 2023-08-18 PROCEDURE — 25000003 PHARM REV CODE 250: Performed by: STUDENT IN AN ORGANIZED HEALTH CARE EDUCATION/TRAINING PROGRAM

## 2023-08-18 PROCEDURE — 85025 COMPLETE CBC W/AUTO DIFF WBC: CPT | Mod: 91 | Performed by: INTERNAL MEDICINE

## 2023-08-18 PROCEDURE — 63600175 PHARM REV CODE 636 W HCPCS

## 2023-08-18 PROCEDURE — 85025 COMPLETE CBC W/AUTO DIFF WBC: CPT | Performed by: STUDENT IN AN ORGANIZED HEALTH CARE EDUCATION/TRAINING PROGRAM

## 2023-08-18 PROCEDURE — 85610 PROTHROMBIN TIME: CPT | Performed by: INTERNAL MEDICINE

## 2023-08-18 PROCEDURE — 99233 SBSQ HOSP IP/OBS HIGH 50: CPT | Mod: ,,, | Performed by: STUDENT IN AN ORGANIZED HEALTH CARE EDUCATION/TRAINING PROGRAM

## 2023-08-18 PROCEDURE — 99233 PR SUBSEQUENT HOSPITAL CARE,LEVL III: ICD-10-PCS | Mod: ,,, | Performed by: STUDENT IN AN ORGANIZED HEALTH CARE EDUCATION/TRAINING PROGRAM

## 2023-08-18 PROCEDURE — 85730 THROMBOPLASTIN TIME PARTIAL: CPT | Performed by: INTERNAL MEDICINE

## 2023-08-18 PROCEDURE — 63600175 PHARM REV CODE 636 W HCPCS: Performed by: ANESTHESIOLOGY

## 2023-08-18 PROCEDURE — 20600001 HC STEP DOWN PRIVATE ROOM

## 2023-08-18 PROCEDURE — 94761 N-INVAS EAR/PLS OXIMETRY MLT: CPT

## 2023-08-18 PROCEDURE — 36415 COLL VENOUS BLD VENIPUNCTURE: CPT | Performed by: INTERNAL MEDICINE

## 2023-08-18 RX ORDER — ONDANSETRON 2 MG/ML
4 INJECTION INTRAMUSCULAR; INTRAVENOUS EVERY 6 HOURS PRN
Status: DISCONTINUED | OUTPATIENT
Start: 2023-08-18 | End: 2023-08-19

## 2023-08-18 RX ORDER — INSULIN ASPART 100 [IU]/ML
1-10 INJECTION, SOLUTION INTRAVENOUS; SUBCUTANEOUS
Status: DISCONTINUED | OUTPATIENT
Start: 2023-08-18 | End: 2023-09-01 | Stop reason: HOSPADM

## 2023-08-18 RX ORDER — INSULIN ASPART 100 [IU]/ML
4 INJECTION, SOLUTION INTRAVENOUS; SUBCUTANEOUS
Status: DISCONTINUED | OUTPATIENT
Start: 2023-08-18 | End: 2023-08-18

## 2023-08-18 RX ADMIN — INSULIN ASPART 2 UNITS: 100 INJECTION, SOLUTION INTRAVENOUS; SUBCUTANEOUS at 08:08

## 2023-08-18 RX ADMIN — CLOPIDOGREL BISULFATE 75 MG: 75 TABLET ORAL at 08:08

## 2023-08-18 RX ADMIN — ATORVASTATIN CALCIUM 40 MG: 40 TABLET, FILM COATED ORAL at 09:08

## 2023-08-18 RX ADMIN — SODIUM CHLORIDE: 9 INJECTION, SOLUTION INTRAVENOUS at 05:08

## 2023-08-18 RX ADMIN — ONDANSETRON 4 MG: 2 INJECTION INTRAMUSCULAR; INTRAVENOUS at 05:08

## 2023-08-18 RX ADMIN — ONDANSETRON 4 MG: 2 INJECTION INTRAMUSCULAR; INTRAVENOUS at 08:08

## 2023-08-18 RX ADMIN — PANTOPRAZOLE SODIUM 40 MG: 40 TABLET, DELAYED RELEASE ORAL at 05:08

## 2023-08-18 RX ADMIN — ESCITALOPRAM OXALATE 10 MG: 10 TABLET ORAL at 08:08

## 2023-08-18 RX ADMIN — SPIRONOLACTONE 12.5 MG: 25 TABLET ORAL at 08:08

## 2023-08-18 RX ADMIN — Medication 400 MG: at 08:08

## 2023-08-18 RX ADMIN — INSULIN DETEMIR 12 UNITS: 100 INJECTION, SOLUTION SUBCUTANEOUS at 08:08

## 2023-08-18 RX ADMIN — ASPIRIN 81 MG: 81 TABLET, COATED ORAL at 08:08

## 2023-08-18 RX ADMIN — ENOXAPARIN SODIUM 60 MG: 60 INJECTION SUBCUTANEOUS at 08:08

## 2023-08-18 RX ADMIN — PIPERACILLIN SODIUM AND TAZOBACTAM SODIUM 4.5 G: 4; .5 INJECTION, POWDER, LYOPHILIZED, FOR SOLUTION INTRAVENOUS at 12:08

## 2023-08-18 RX ADMIN — HYDROMORPHONE HYDROCHLORIDE 0.5 MG: 1 INJECTION, SOLUTION INTRAMUSCULAR; INTRAVENOUS; SUBCUTANEOUS at 08:08

## 2023-08-18 RX ADMIN — Medication 9 MG: at 08:08

## 2023-08-18 RX ADMIN — LOSARTAN POTASSIUM 25 MG: 25 TABLET, FILM COATED ORAL at 08:08

## 2023-08-18 RX ADMIN — GABAPENTIN 300 MG: 300 CAPSULE ORAL at 08:08

## 2023-08-18 RX ADMIN — METOPROLOL SUCCINATE 12.5 MG: 25 TABLET, EXTENDED RELEASE ORAL at 08:08

## 2023-08-18 RX ADMIN — SODIUM CHLORIDE: 9 INJECTION, SOLUTION INTRAVENOUS at 11:08

## 2023-08-18 RX ADMIN — GABAPENTIN 600 MG: 300 CAPSULE ORAL at 08:08

## 2023-08-18 NOTE — ASSESSMENT & PLAN NOTE
Noted on prior admissions c/f metastatic disease with pancreatic primary, biopsy done 8/17, pending results

## 2023-08-18 NOTE — TREATMENT PLAN
AES Treatment Plan    Mariann Huff is a 63 y.o. female admitted to hospital 8/14/2023 (Hospital Day: 5) due to STEMI (ST elevation myocardial infarction).     Interval History  Patient transferred from cardiology to hospital medicine today. Today, patient reports diffuse abdominal soreness post-procedure, but she denies pain. She denies vomiting with initiation of clear liquid diet. Labs today notable for improving hyperbilirubinemia (3.5 from 3.8) and improving transaminitis (, ALT 62).     Objective  Temp:  [96.5 °F (35.8 °C)-98.6 °F (37 °C)] 97.3 °F (36.3 °C) (08/18 0747)  Pulse:  [56-93] 72 (08/18 0747)  BP: ()/(53-71) 103/56 (08/18 0747)  Resp:  [14-20] 18 (08/18 0747)  SpO2:  [93 %-100 %] 99 % (08/18 0747)    General: Alert, Oriented x3, no distress  Abdomen: Non-distended. Normal tympany. Soft. Tender to palpation bilateral upper abdomen (same degree compared to yesterday prior to ERCP) . No peritoneal signs.    Laboratory  Lab Results   Component Value Date    WBC 16.61 (H) 08/17/2023    HGB 9.6 (L) 08/17/2023    HCT 32.7 (L) 08/17/2023    MCV 83 08/17/2023     08/17/2023       Lab Results   Component Value Date    ALT 62 (H) 08/18/2023    ALT 62 (H) 08/18/2023     (H) 08/18/2023     (H) 08/18/2023    ALKPHOS 843 (H) 08/18/2023    ALKPHOS 837 (H) 08/18/2023    BILITOT 3.5 (H) 08/18/2023    BILITOT 3.5 (H) 08/18/2023       Assessment  This is a 63 year old female with a PMH significant for CAD (status post PCI in 2014), DVT (on Apixaban), MURTAZA, pancreatitis (diagnosed 05/2023; attributed to TRULICITY usage initially; status post EUS/ERCP in 06/2023 showing gallbladder sludge and stricture in lower third of main duct suspected to be from pancreatitis with plastic stent placed into CBD; due for removal in 08/2023), peripheral artery disease (on PLETAL), and T2DM and a PSH significant for subtotal cholecystectomy (late 06/2023) who was admitted to [facility] on 08/14 for  posterior STEMI requiring PCI on admission and initiation of ASA and PLAVIX. Hospital course associated with onset of abdominal pain and progressive hyperbilirubinemia since 08/16 with CT A/P non-contrast from that time concerning for pancreatic head mass with multiple hepatic lesions concerning for underlying pancreatic cancer. EUS/ERCP completed on 08/17 showing pancreatic head mass, multiple hepatic lesions concerning for metastases, and occluded prior biliary stent; biopsy obtained and stent exchanged for covered metal stent. Post-procedure, LFT's improving and no evidence of post-procedure complications.     Recommendations    -Okay to continue clear liquid diet and advance as tolerated.   -Anti-emetics PRN.   -Await pathology results.   -CMP daily.   -Referral to medical oncology.   - We are signing-off. Please call with any questions.        Vijay Bonilla MD, PGY-VI  Gastroenterology Fellow  [facility]

## 2023-08-18 NOTE — PLAN OF CARE
Problem: Diabetes Comorbidity  Goal: Blood Glucose Level Within Targeted Range  Outcome: Ongoing, Progressing     Problem: Fluid and Electrolyte Imbalance (Acute Kidney Injury/Impairment)  Goal: Fluid and Electrolyte Balance  Outcome: Ongoing, Progressing     Problem: Oral Intake Inadequate (Acute Kidney Injury/Impairment)  Goal: Optimal Nutrition Intake  Outcome: Ongoing, Progressing     Problem: Renal Function Impairment (Acute Kidney Injury/Impairment)  Goal: Effective Renal Function  Outcome: Ongoing, Progressing     Problem: Adult Inpatient Plan of Care  Goal: Plan of Care Review  Outcome: Ongoing, Progressing  Goal: Patient-Specific Goal (Individualized)  Outcome: Ongoing, Progressing  Goal: Absence of Hospital-Acquired Illness or Injury  Outcome: Ongoing, Progressing  Goal: Optimal Comfort and Wellbeing  Outcome: Ongoing, Progressing  Goal: Readiness for Transition of Care  Outcome: Ongoing, Progressing     Problem: Fall Injury Risk  Goal: Absence of Fall and Fall-Related Injury  Outcome: Ongoing, Progressing

## 2023-08-18 NOTE — PROGRESS NOTES
[facility] Medicine  Progress Note    Patient Name: Mariann Huff  MRN: [id number]  Patient Class: IP- Inpatient   Admission Date: 8/14/2023  Length of Stay: 4 days  Attending Physician: Baudilio Lozada MD  Primary Care Provider: Jasbir Haney MD        Subjective:     Principal Problem:STEMI (ST elevation myocardial infarction)        HPI:  Patient presented to the ED with nausea, vomiting, and chest pain. EKG in the ED showed posterior changes consistent with STEMI.        Overview/Hospital Course:  Patient was taken to cath lab and her symptoms resolved after PCI. Patient chest pain resolved.     Patient reported development of acute abdominal pain associated with nausea and vomiting. Patient was recently admitted (6/23) for suspected biliary pancreatitis and biliary stricture treated with biliary sphincterotomy, biliary stent, and cholecystectomy.    Patient now has worsening transaminitis w/ elevated T-Bili.   Concern for stent occlusion. Patient amylase 107 wnl, and Lipase 61. Workup will include total abdominal ultrasound and [facility] ([facility]) was consulted . Was scheduled for outpatient w/u of hepatic lesions found on previous admission 8/2/23.  Patient had ERCP and EUS on 8/17/23. Visualized a partial occluded stent in the biliary tree. One covered metal stent was placed into the common bile duct. Concern for metastatic cancer is high due to multiple liver lesions and mass on pancreatic head per AES. Initially on previous hospital admission, it was believed to more likely be liver abscesses rather than malignancy due to acute nature of progression. S/p FNA biopsy with ERCP and stent placement on 8/17.       Interval History: NAEON    Review of Systems   Constitutional:  Positive for fatigue.   Respiratory:  Negative for shortness of breath.    Cardiovascular:  Negative for chest pain.   Gastrointestinal:  Positive for abdominal pain.   Genitourinary:  Negative for  dysuria and frequency.   Neurological:  Negative for dizziness and headaches.     Objective:     Vital Signs (Most Recent):  Temp: 96.5 °F (35.8 °C) (08/18/23 1145)  Pulse: 72 (08/18/23 1217)  Resp: 18 (08/18/23 1145)  BP: (!) 107/58 (08/18/23 1145)  SpO2: 96 % (08/18/23 1145) Vital Signs (24h Range):  Temp:  [96.5 °F (35.8 °C)-98.6 °F (37 °C)] 96.5 °F (35.8 °C)  Pulse:  [56-77] 72  Resp:  [16-18] 18  SpO2:  [93 %-99 %] 96 %  BP: ()/(53-70) 107/58     Weight: 56.1 kg (123 lb 10.9 oz)  Body mass index is 21.23 kg/m².    Intake/Output Summary (Last 24 hours) at 8/18/2023 1639  Last data filed at 8/18/2023 0901  Gross per 24 hour   Intake 440 ml   Output --   Net 440 ml         Physical Exam  Constitutional:       Appearance: She is ill-appearing.   HENT:      Head: Normocephalic and atraumatic.   Cardiovascular:      Rate and Rhythm: Normal rate and regular rhythm.      Pulses: Normal pulses.      Heart sounds: Normal heart sounds.   Pulmonary:      Effort: Pulmonary effort is normal.      Breath sounds: Normal breath sounds.   Abdominal:      General: Abdomen is flat.      Tenderness: There is abdominal tenderness.   Musculoskeletal:      Right lower leg: No edema.      Left lower leg: No edema.   Skin:     General: Skin is warm.      Capillary Refill: Capillary refill takes less than 2 seconds.      Coloration: Skin is not jaundiced or pale.   Neurological:      General: No focal deficit present.      Mental Status: She is alert and oriented to person, place, and time.   Psychiatric:         Mood and Affect: Mood normal.             Significant Labs: All pertinent labs within the past 24 hours have been reviewed.  BMP:   Recent Labs   Lab 08/18/23  0840   *      K 3.8      CO2 22*   BUN 12   CREATININE 0.7   CALCIUM 8.0*   MG 2.5     CBC:   Recent Labs   Lab 08/17/23  0220 08/18/23  0851 08/18/23  1317   WBC 16.61* 12.59 15.05*   HGB 9.6* 6.3* 8.4*   HCT 32.7* 21.2* 27.8*    211 291  "    CMP:   Recent Labs   Lab 08/17/23  0220 08/18/23  0840    141   K 3.7 3.8    106   CO2 30* 22*   * 138*   BUN 12 12   CREATININE 0.8 0.7   CALCIUM 8.6* 8.0*   PROT 8.1 6.9  6.9   ALBUMIN 2.2* 1.8*  1.8*   BILITOT 3.8* 3.5*  3.5*   ALKPHOS 1,212* 837*  843*   * 110*  108*   ALT 93* 62*  62*   ANIONGAP 13 13     Lactic Acid: No results for input(s): "LACTATE" in the last 48 hours.  Troponin: No results for input(s): "TROPONINI", "TROPONINIHS" in the last 48 hours.  Urine Culture: No results for input(s): "LABURIN" in the last 48 hours.  Urine Studies: No results for input(s): "COLORU", "APPEARANCEUA", "PHUR", "SPECGRAV", "PROTEINUA", "GLUCUA", "KETONESU", "BILIRUBINUA", "OCCULTUA", "NITRITE", "UROBILINOGEN", "LEUKOCYTESUR", "RBCUA", "WBCUA", "BACTERIA", "SQUAMEPITHEL", "HYALINECASTS" in the last 48 hours.    Invalid input(s): "WRIGHTSUR"    Significant Imaging: I have reviewed all pertinent imaging results/findings within the past 24 hours.      Assessment/Plan:      * STEMI (ST elevation myocardial infarction)  -Started having near syncope around 4 pm on the day of admission  -Bedside echo in the ER showed an EF 45-50% and norma-lateral and infero-lateral hypokinesis  -She underwent Successful primary PCI of ramus intermedius and lateral branch with Pronto thrombectomy and 3X16 GINGER     Plan  - got 750 cc in the lab and additional IVF @ 100 cc/hr x 12 hours, LVEDP 9 and appeared volume down (was discharged on lasix 40 daily for ADHF, might have to discharge home this time on 20 daily lasix)  - Bed rest x 4 hours   - Continue aspirin 81 mg daily, stop after 1 month to avoid triple therapy  - Loaded with brilinta 180, loaded with plavix 600 8/15/23, and 75 qd there after  - Hold Eliquis for a fib, using Lovenox  - Continue high intensity statin therapy (LDL goal < 70)  - TTE shows ejection fraction of about 40% associated with grade II diastolic dysfunction.     - Upon discharge, " patient will continue aspirin and plavix  - Will likely dc on Lovenox due to recurrent hospitalizations that may require acute procedural interventions      Biliary obstruction  Patient reports development of acute abdominal pain associated with nausea and vomiting. Patient was recently admitted (6/23) for suspected biliary pancreatitis and biliary stricture treated with biliary sphincterotomy, biliary stent, and cholecystectomy.      - worsening transaminitis w/ elevated TB, Concern for stent occlusion  - Patient amylase 107 wnl, and Lipase 61  -AES consulted, s/p ERCP w/ biopsy on 8/17, transaminases and bili improving       Abdominal pain  See biliary obstruction, pain is stable      CHF (congestive heart failure)  -Last week had CXR with B/L edema, , EF decreased to 40-45%  -Got IV lasix while inpatient and was discharged on 40 po daily however became dehydrated and weak and LVEDP -in the lab showed LVEDP 9, reduce dose to 20 po  - Continue losartan, jarad, and metoprolol, resume SGLT at dc    Liver lesion  Noted on prior admissions c/f metastatic disease with pancreatic primary, biopsy done 8/17, pending results      History of pancreatitis  Recent (6/2023) suspected biliary pancreatitis and biliary stricture treated with biliary sphincterotomy, biliary stent, and cholecystectomy.  CT A/P with new innumerable hepatic lesions:  1. Innumerable low attenuating hepatic lesions, new since the previous exam.  Malignancy remains a concern however given short-term development, correlation is needed to exclude multifocal infection/abscess in this patient status post bile duct stent placement and cholecystectomy.  Please note, there is a low attenuating focus within the gallbladder fossa, similar to the foci throughout the hepatic parenchyma..  2. Pancreatic ductal dilatation up to 4 mm, minimally increased compared to prior.  There is fullness of the pancreatic head, underlying mass is not excluded, correlation  with recent ERCP advised.  3. Simple and complex bilateral renal cysts.  4. Biliary stent in place with expected pneumobilia.  5. Cholecystectomy with scattered fluid about the gallbladder fossa.  6. Moderate stool throughout the colon, may reflect developing constipation.  7.  Additional findings as above.     - concern for lesions seen on CT A/P  - Patient was found to have a hypoechoic pancreatic mass on abdominal US  - Was also found to have this mass on EUS concerning for malignancy   - EUS and ERCP 8/17/23, found to have a partially occluded stent. Bare metal stent placed in CBD  -  Multiple lesions seen on previous admit have been visualized. Per AES more concerning for metastatic cancer  - FNA biopsy of lesions, F/U with cytology results   - Advancing diet to regular diet  - Stopping abx    Type 2 diabetes mellitus with stage 2 chronic kidney disease and hypertension  Home Antihyperglycemic Regiment:  -Farxiga, glipizide, insulin daily at home  - Titrate and addition of insulin as needed for BG goal 140-180  - SSI with POCT accuchecks ACHS and Diabetic diet 2000 cal  - Diabetic nutritional counseling given   - Continue home gabapentin for diabetic peripheral neuropathy  - Appreciate Endocrinology recs  - Adjust insulin and monitor closely      VTE Risk Mitigation (From admission, onward)           Ordered     enoxaparin injection 60 mg  Every 12 hours         08/15/23 1044                    Discharge Planning   REBECCA: 8/19/2023     Code Status: Full Code   Is the patient medically ready for discharge?: No    Reason for patient still in hospital (select all that apply): Patient trending condition, Treatment and Consult recommendations  Discharge Plan A: Home with family, Home Health                  Baudilio Lozada MD  Department of Hospital Medicine   [facility]

## 2023-08-18 NOTE — HPI
63 y.o. female with CAD s/p GINGER to ostial LAD in 2014, HTN, HLD, DM2, MURTAZA who presented to the ED to the ER after a near syncopal event at home around 4 pm. She also feels tired and her appetite lately has decreased. she had chest pain and visceral symptoms with nausea and vomiting. She reports losing weight as well.She has had abdominal symptoms with poor PO intake leading to orthostasis. Today she had another near syncopal event and is why she was brought to the ER. Here her ECG showed the posterior changes for which posterior EKG was obtained with showed STEMI. Bedside echo showed an EF 45-50% and norma-lateral and infero-lateral hypokinesis. She was taken to cath lab and her symptoms resolved after PCI. Please refer to PCI for full details.

## 2023-08-18 NOTE — ASSESSMENT & PLAN NOTE
Patient reports development of acute abdominal pain associated with nausea and vomiting. Patient was recently admitted (6/23) for suspected biliary pancreatitis and biliary stricture treated with biliary sphincterotomy, biliary stent, and cholecystectomy.      - worsening transaminitis w/ elevated TB, Concern for stent occlusion  - Patient amylase 107 wnl, and Lipase 61  -AES consulted, s/p ERCP w/ biopsy on 8/17, transaminases and bili improving

## 2023-08-18 NOTE — CARE UPDATE
Patient transferred to  service under Dr. Lozada's care.     Dayanna Osorio MD  Cardiology, PGY-3

## 2023-08-18 NOTE — ASSESSMENT & PLAN NOTE
Home Antihyperglycemic Regiment:  -Farxiga, glipizide, insulin daily at home  - Titrate and addition of insulin as needed for BG goal 140-180  - SSI with POCT accuchecks ACHS and Diabetic diet 2000 beti  - Diabetic nutritional counseling given   - Continue home gabapentin for diabetic peripheral neuropathy  - Appreciate Endocrinology recs  - Adjust insulin and monitor closely

## 2023-08-18 NOTE — ASSESSMENT & PLAN NOTE
-Started having near syncope around 4 pm on the day of admission  -Bedside echo in the ER showed an EF 45-50% and norma-lateral and infero-lateral hypokinesis  -She underwent Successful primary PCI of ramus intermedius and lateral branch with Pronto thrombectomy and 3X16 GINGER     Plan  - got 750 cc in the lab and additional IVF @ 100 cc/hr x 12 hours, LVEDP 9 and appeared volume down (was discharged on lasix 40 daily for ADHF, might have to discharge home this time on 20 daily lasix)  - Bed rest x 4 hours   - Continue aspirin 81 mg daily, stop after 1 month to avoid triple therapy  - Loaded with brilinta 180, loaded with plavix 600 8/15/23, and 75 qd there after  - Hold Eliquis for a fib, using Lovenox  - Continue high intensity statin therapy (LDL goal < 70)  - TTE shows ejection fraction of about 40% associated with grade II diastolic dysfunction.     - Upon discharge, patient will continue aspirin and plavix  - Will likely dc on Lovenox due to recurrent hospitalizations that may require acute procedural interventions

## 2023-08-18 NOTE — ASSESSMENT & PLAN NOTE
Recent (6/2023) suspected biliary pancreatitis and biliary stricture treated with biliary sphincterotomy, biliary stent, and cholecystectomy.  CT A/P with new innumerable hepatic lesions:  1. Innumerable low attenuating hepatic lesions, new since the previous exam.  Malignancy remains a concern however given short-term development, correlation is needed to exclude multifocal infection/abscess in this patient status post bile duct stent placement and cholecystectomy.  Please note, there is a low attenuating focus within the gallbladder fossa, similar to the foci throughout the hepatic parenchyma..  2. Pancreatic ductal dilatation up to 4 mm, minimally increased compared to prior.  There is fullness of the pancreatic head, underlying mass is not excluded, correlation with recent ERCP advised.  3. Simple and complex bilateral renal cysts.  4. Biliary stent in place with expected pneumobilia.  5. Cholecystectomy with scattered fluid about the gallbladder fossa.  6. Moderate stool throughout the colon, may reflect developing constipation.  7.  Additional findings as above.     - concern for lesions seen on CT A/P  - Patient was found to have a hypoechoic pancreatic mass on abdominal US  - Was also found to have this mass on EUS concerning for malignancy   - EUS and ERCP 8/17/23, found to have a partially occluded stent. Bare metal stent placed in CBD  -  Multiple lesions seen on previous admit have been visualized. Per AES more concerning for metastatic cancer  - FNA biopsy of lesions, F/U with cytology results   - Advancing diet to regular diet  - Stopping abx

## 2023-08-18 NOTE — ASSESSMENT & PLAN NOTE
-Last week had CXR with B/L edema, , EF decreased to 40-45%  -Got IV lasix while inpatient and was discharged on 40 po daily however became dehydrated and weak and LVEDP -in the lab showed LVEDP 9, reduce dose to 20 po  - Will add on GDMT as tolerated appears euvolemic, has poor PO intake

## 2023-08-18 NOTE — SUBJECTIVE & OBJECTIVE
Interval History: NAEON    Review of Systems   Constitutional:  Positive for fatigue.   Respiratory:  Negative for shortness of breath.    Cardiovascular:  Negative for chest pain.   Gastrointestinal:  Positive for abdominal pain.   Genitourinary:  Negative for dysuria and frequency.   Neurological:  Negative for dizziness and headaches.     Objective:     Vital Signs (Most Recent):  Temp: 96.5 °F (35.8 °C) (08/18/23 1145)  Pulse: 72 (08/18/23 1217)  Resp: 18 (08/18/23 1145)  BP: (!) 107/58 (08/18/23 1145)  SpO2: 96 % (08/18/23 1145) Vital Signs (24h Range):  Temp:  [96.5 °F (35.8 °C)-98.6 °F (37 °C)] 96.5 °F (35.8 °C)  Pulse:  [56-77] 72  Resp:  [16-18] 18  SpO2:  [93 %-99 %] 96 %  BP: ()/(53-70) 107/58     Weight: 56.1 kg (123 lb 10.9 oz)  Body mass index is 21.23 kg/m².    Intake/Output Summary (Last 24 hours) at 8/18/2023 1639  Last data filed at 8/18/2023 0901  Gross per 24 hour   Intake 440 ml   Output --   Net 440 ml         Physical Exam  Constitutional:       Appearance: She is ill-appearing.   HENT:      Head: Normocephalic and atraumatic.   Cardiovascular:      Rate and Rhythm: Normal rate and regular rhythm.      Pulses: Normal pulses.      Heart sounds: Normal heart sounds.   Pulmonary:      Effort: Pulmonary effort is normal.      Breath sounds: Normal breath sounds.   Abdominal:      General: Abdomen is flat.      Tenderness: There is abdominal tenderness.   Musculoskeletal:      Right lower leg: No edema.      Left lower leg: No edema.   Skin:     General: Skin is warm.      Capillary Refill: Capillary refill takes less than 2 seconds.      Coloration: Skin is not jaundiced or pale.   Neurological:      General: No focal deficit present.      Mental Status: She is alert and oriented to person, place, and time.   Psychiatric:         Mood and Affect: Mood normal.             Significant Labs: All pertinent labs within the past 24 hours have been reviewed.  BMP:   Recent Labs   Lab 08/18/23  0856  "  *      K 3.8      CO2 22*   BUN 12   CREATININE 0.7   CALCIUM 8.0*   MG 2.5     CBC:   Recent Labs   Lab 08/17/23  0220 08/18/23  0851 08/18/23  1317   WBC 16.61* 12.59 15.05*   HGB 9.6* 6.3* 8.4*   HCT 32.7* 21.2* 27.8*    211 291     CMP:   Recent Labs   Lab 08/17/23  0220 08/18/23  0840    141   K 3.7 3.8    106   CO2 30* 22*   * 138*   BUN 12 12   CREATININE 0.8 0.7   CALCIUM 8.6* 8.0*   PROT 8.1 6.9  6.9   ALBUMIN 2.2* 1.8*  1.8*   BILITOT 3.8* 3.5*  3.5*   ALKPHOS 1,212* 837*  843*   * 110*  108*   ALT 93* 62*  62*   ANIONGAP 13 13     Lactic Acid: No results for input(s): "LACTATE" in the last 48 hours.  Troponin: No results for input(s): "TROPONINI", "TROPONINIHS" in the last 48 hours.  Urine Culture: No results for input(s): "LABURIN" in the last 48 hours.  Urine Studies: No results for input(s): "COLORU", "APPEARANCEUA", "PHUR", "SPECGRAV", "PROTEINUA", "GLUCUA", "KETONESU", "BILIRUBINUA", "OCCULTUA", "NITRITE", "UROBILINOGEN", "LEUKOCYTESUR", "RBCUA", "WBCUA", "BACTERIA", "SQUAMEPITHEL", "HYALINECASTS" in the last 48 hours.    Invalid input(s): "WRIGHTSUR"    Significant Imaging: I have reviewed all pertinent imaging results/findings within the past 24 hours.  "

## 2023-08-18 NOTE — ASSESSMENT & PLAN NOTE
-Last week had CXR with B/L edema, , EF decreased to 40-45%  -Got IV lasix while inpatient and was discharged on 40 po daily however became dehydrated and weak and LVEDP -in the lab showed LVEDP 9, reduce dose to 20 po  - Continue losartan, jarad, and metoprolol, resume SGLT at dc

## 2023-08-18 NOTE — HOSPITAL COURSE
Patient was taken to cath lab and her symptoms resolved after PCI. Patient chest pain resolved.     Patient reported development of acute abdominal pain associated with nausea and vomiting. Patient was recently admitted (6/23) for suspected biliary pancreatitis and biliary stricture treated with biliary sphincterotomy, biliary stent, and cholecystectomy.    Patient now has worsening transaminitis w/ elevated T-Bili.   Concern for stent occlusion. Patient amylase 107 wnl, and Lipase 61. Workup will include total abdominal ultrasound and [facility] ([facility]) was consulted . Was scheduled for outpatient w/u of hepatic lesions found on previous admission 8/2/23.  Patient had ERCP and EUS on 8/17/23. Visualized a partial occluded stent in the biliary tree. One covered metal stent was placed into the common bile duct. Concern for metastatic cancer is high due to multiple liver lesions and mass on pancreatic head per AES. Initially on previous hospital admission, it was believed to more likely be liver abscesses rather than malignancy due to acute nature of progression. S/p FNA biopsy with ERCP and stent placement on 8/17.     Became hypoglycemic on 8/19 and Hgb dropped from 8 to 6.3 with a reported dark stool, Improved to 7.1 after 1 unit PRBC, could not get a second unit due to respiratory distress, no further episodes of melena. Seen by GI, did not recommend endoscopy at this time for possible GIB.  Given lasix 20mg IV then 40mg IV with good output and improvement in respiratory status.    T bili continued to rise on 8/21, KUB did not show stent migration, patient underwent ERCP again on 8/22 which demonstrated a visibly ocluded stent in the biliary tree with a single biliary stricture in main BD. One covered metal stent placed in CBD. Several hours following this procedure, patient developed rapid onset fever to 103 and rapid drop in BP from SBP 180s to SBP 80s. Patient admitted to MICU for further  management. She was continued on vanc/cefepime, briefly on levophed and then weaned off. Still with intermittent AMS and elevateed WBC, febrile 8/25. Adjusted abx to vanc/cipro/flagyl with improvement in mentation. Oral intake still poor, PAPA with Cr 1.4 on 8/26, improved with IVF. Still nauseous

## 2023-08-18 NOTE — CARE UPDATE
-Glucose Goal 140-180    -A1C:   Hemoglobin A1C   Date Value Ref Range Status   07/10/2023 8.8 (H) 4.0 - 5.6 % Final     Comment:     ADA Screening Guidelines:  5.7-6.4%  Consistent with prediabetes  >or=6.5%  Consistent with diabetes    High levels of fetal hemoglobin interfere with the HbA1C  assay. Heterozygous hemoglobin variants (HbS, HgC, etc)do  not significantly interfere with this assay.   However, presence of multiple variants may affect accuracy.           -HOME REGIMEN: Glipizide ER 10mg before breakfast. Levemir 2u daily on a sliding scale     -INPATIENT REGIMEN: correction scale and levemir 8 units daily     -GLUCOSE TREND FOR THE PAST 24HRS:   Recent Labs   Lab 08/16/23  2044 08/17/23  0740 08/17/23  1044 08/17/23  1204 08/17/23  1529 08/17/23  1917   POCTGLUCOSE 201* 175* 257* 230* 296* 255*           -NO HYPOGYCEMIAS NOTED     - Diet  Diet clear liquid Standard Tray    -TOLERATING - % OF PO DIET     Remains in 348/348 A. BG above goal yesterday.        Plan:   - Levemir 12 units daily (20% increase due to fasting blood glucose above goal)   - Novolog MDC (150/25)  - POCT Glucose every 4 hours  - Hypoglycemia protocol in place      ** Please notify Endocrine for any change and/or advance in diet**  ** Please call Endocrine for any BG related issues **     Discharge Planning:   TBD. Please notify endocrinology prior to discharge.      Blayne Gray DNP, FNP-C  Department of Endocrinology  Inpatient Glycemic Management

## 2023-08-19 PROBLEM — I48.91 ATRIAL FIBRILLATION: Status: ACTIVE | Noted: 2023-08-19

## 2023-08-19 PROBLEM — D64.89 OTHER SPECIFIED ANEMIAS: Status: ACTIVE | Noted: 2023-08-02

## 2023-08-19 LAB
ABO + RH BLD: NORMAL
ALBUMIN SERPL BCP-MCNC: 1.6 G/DL (ref 3.5–5.2)
ALP SERPL-CCNC: 752 U/L (ref 55–135)
ALT SERPL W/O P-5'-P-CCNC: 56 U/L (ref 10–44)
ANION GAP SERPL CALC-SCNC: 11 MMOL/L (ref 8–16)
APTT PPP: 37 SEC (ref 21–32)
AST SERPL-CCNC: 114 U/L (ref 10–40)
BASOPHILS # BLD AUTO: 0.01 K/UL (ref 0–0.2)
BASOPHILS # BLD AUTO: 0.02 K/UL (ref 0–0.2)
BASOPHILS NFR BLD: 0.1 % (ref 0–1.9)
BASOPHILS NFR BLD: 0.2 % (ref 0–1.9)
BILIRUB DIRECT SERPL-MCNC: 2.7 MG/DL (ref 0.1–0.3)
BILIRUB SERPL-MCNC: 3.4 MG/DL (ref 0.1–1)
BLD GP AB SCN CELLS X3 SERPL QL: NORMAL
BUN SERPL-MCNC: 10 MG/DL (ref 8–23)
CALCIUM SERPL-MCNC: 7.9 MG/DL (ref 8.7–10.5)
CHLORIDE SERPL-SCNC: 109 MMOL/L (ref 95–110)
CO2 SERPL-SCNC: 24 MMOL/L (ref 23–29)
CREAT SERPL-MCNC: 0.7 MG/DL (ref 0.5–1.4)
DAT IGG-SP REAG RBC-IMP: NORMAL
DIFFERENTIAL METHOD: ABNORMAL
DIFFERENTIAL METHOD: ABNORMAL
EOSINOPHIL # BLD AUTO: 0.2 K/UL (ref 0–0.5)
EOSINOPHIL # BLD AUTO: 0.2 K/UL (ref 0–0.5)
EOSINOPHIL NFR BLD: 1.5 % (ref 0–8)
EOSINOPHIL NFR BLD: 1.9 % (ref 0–8)
ERYTHROCYTE [DISTWIDTH] IN BLOOD BY AUTOMATED COUNT: 16.8 % (ref 11.5–14.5)
ERYTHROCYTE [DISTWIDTH] IN BLOOD BY AUTOMATED COUNT: 16.9 % (ref 11.5–14.5)
EST. GFR  (NO RACE VARIABLE): >60 ML/MIN/1.73 M^2
FIBRINOGEN PPP-MCNC: 489 MG/DL (ref 182–400)
FOLATE SERPL-MCNC: 2.5 NG/ML (ref 4–24)
GLUCOSE SERPL-MCNC: 34 MG/DL (ref 70–110)
HAPTOGLOB SERPL-MCNC: 469 MG/DL (ref 30–250)
HCT VFR BLD AUTO: 20.3 % (ref 37–48.5)
HCT VFR BLD AUTO: 22.6 % (ref 37–48.5)
HGB BLD-MCNC: 6.1 G/DL (ref 12–16)
HGB BLD-MCNC: 6.4 G/DL (ref 12–16)
IMM GRANULOCYTES # BLD AUTO: 0.04 K/UL (ref 0–0.04)
IMM GRANULOCYTES # BLD AUTO: 0.08 K/UL (ref 0–0.04)
IMM GRANULOCYTES NFR BLD AUTO: 0.4 % (ref 0–0.5)
IMM GRANULOCYTES NFR BLD AUTO: 0.8 % (ref 0–0.5)
INR PPP: 1.2 (ref 0.8–1.2)
LYMPHOCYTES # BLD AUTO: 0.9 K/UL (ref 1–4.8)
LYMPHOCYTES # BLD AUTO: 0.9 K/UL (ref 1–4.8)
LYMPHOCYTES NFR BLD: 8.5 % (ref 18–48)
LYMPHOCYTES NFR BLD: 8.6 % (ref 18–48)
MAGNESIUM SERPL-MCNC: 2.5 MG/DL (ref 1.6–2.6)
MCH RBC QN AUTO: 24.3 PG (ref 27–31)
MCH RBC QN AUTO: 25.3 PG (ref 27–31)
MCHC RBC AUTO-ENTMCNC: 28.3 G/DL (ref 32–36)
MCHC RBC AUTO-ENTMCNC: 30 G/DL (ref 32–36)
MCV RBC AUTO: 84 FL (ref 82–98)
MCV RBC AUTO: 86 FL (ref 82–98)
MONOCYTES # BLD AUTO: 0.7 K/UL (ref 0.3–1)
MONOCYTES # BLD AUTO: 0.9 K/UL (ref 0.3–1)
MONOCYTES NFR BLD: 7 % (ref 4–15)
MONOCYTES NFR BLD: 8.1 % (ref 4–15)
NEUTROPHILS # BLD AUTO: 8.4 K/UL (ref 1.8–7.7)
NEUTROPHILS # BLD AUTO: 8.8 K/UL (ref 1.8–7.7)
NEUTROPHILS NFR BLD: 80.6 % (ref 38–73)
NEUTROPHILS NFR BLD: 82.3 % (ref 38–73)
NRBC BLD-RTO: 0 /100 WBC
NRBC BLD-RTO: 0 /100 WBC
PLATELET # BLD AUTO: 262 K/UL (ref 150–450)
PLATELET # BLD AUTO: 262 K/UL (ref 150–450)
PMV BLD AUTO: 12.8 FL (ref 9.2–12.9)
PMV BLD AUTO: 13.2 FL (ref 9.2–12.9)
POCT GLUCOSE: 111 MG/DL (ref 70–110)
POCT GLUCOSE: 122 MG/DL (ref 70–110)
POCT GLUCOSE: 154 MG/DL (ref 70–110)
POCT GLUCOSE: 211 MG/DL (ref 70–110)
POCT GLUCOSE: 28 MG/DL (ref 70–110)
POCT GLUCOSE: 34 MG/DL (ref 70–110)
POCT GLUCOSE: 35 MG/DL (ref 70–110)
POTASSIUM SERPL-SCNC: 3.1 MMOL/L (ref 3.5–5.1)
PROT SERPL-MCNC: 6.2 G/DL (ref 6–8.4)
PROTHROMBIN TIME: 12.4 SEC (ref 9–12.5)
RBC # BLD AUTO: 2.41 M/UL (ref 4–5.4)
RBC # BLD AUTO: 2.63 M/UL (ref 4–5.4)
RETICS/RBC NFR AUTO: 1 % (ref 0.5–2.5)
SODIUM SERPL-SCNC: 144 MMOL/L (ref 136–145)
SPECIMEN OUTDATE: NORMAL
VIT B12 SERPL-MCNC: >2000 PG/ML (ref 210–950)
WBC # BLD AUTO: 10.43 K/UL (ref 3.9–12.7)
WBC # BLD AUTO: 10.63 K/UL (ref 3.9–12.7)

## 2023-08-19 PROCEDURE — 99233 PR SUBSEQUENT HOSPITAL CARE,LEVL III: ICD-10-PCS | Mod: ,,, | Performed by: STUDENT IN AN ORGANIZED HEALTH CARE EDUCATION/TRAINING PROGRAM

## 2023-08-19 PROCEDURE — C9113 INJ PANTOPRAZOLE SODIUM, VIA: HCPCS | Performed by: STUDENT IN AN ORGANIZED HEALTH CARE EDUCATION/TRAINING PROGRAM

## 2023-08-19 PROCEDURE — 83735 ASSAY OF MAGNESIUM: CPT | Performed by: INTERNAL MEDICINE

## 2023-08-19 PROCEDURE — 86880 COOMBS TEST DIRECT: CPT | Performed by: STUDENT IN AN ORGANIZED HEALTH CARE EDUCATION/TRAINING PROGRAM

## 2023-08-19 PROCEDURE — 93010 ELECTROCARDIOGRAM REPORT: CPT | Mod: ,,, | Performed by: INTERNAL MEDICINE

## 2023-08-19 PROCEDURE — 85730 THROMBOPLASTIN TIME PARTIAL: CPT | Performed by: INTERNAL MEDICINE

## 2023-08-19 PROCEDURE — 36430 TRANSFUSION BLD/BLD COMPNT: CPT

## 2023-08-19 PROCEDURE — 25000003 PHARM REV CODE 250: Performed by: INTERNAL MEDICINE

## 2023-08-19 PROCEDURE — 25000003 PHARM REV CODE 250

## 2023-08-19 PROCEDURE — 85610 PROTHROMBIN TIME: CPT | Performed by: INTERNAL MEDICINE

## 2023-08-19 PROCEDURE — 94761 N-INVAS EAR/PLS OXIMETRY MLT: CPT

## 2023-08-19 PROCEDURE — 93010 EKG 12-LEAD: ICD-10-PCS | Mod: ,,, | Performed by: INTERNAL MEDICINE

## 2023-08-19 PROCEDURE — P9016 RBC LEUKOCYTES REDUCED: HCPCS | Performed by: STUDENT IN AN ORGANIZED HEALTH CARE EDUCATION/TRAINING PROGRAM

## 2023-08-19 PROCEDURE — 82248 BILIRUBIN DIRECT: CPT | Performed by: STUDENT IN AN ORGANIZED HEALTH CARE EDUCATION/TRAINING PROGRAM

## 2023-08-19 PROCEDURE — 93005 ELECTROCARDIOGRAM TRACING: CPT

## 2023-08-19 PROCEDURE — 99233 SBSQ HOSP IP/OBS HIGH 50: CPT | Mod: ,,, | Performed by: STUDENT IN AN ORGANIZED HEALTH CARE EDUCATION/TRAINING PROGRAM

## 2023-08-19 PROCEDURE — 85025 COMPLETE CBC W/AUTO DIFF WBC: CPT | Mod: 91 | Performed by: INTERNAL MEDICINE

## 2023-08-19 PROCEDURE — 94660 CPAP INITIATION&MGMT: CPT

## 2023-08-19 PROCEDURE — 86900 BLOOD TYPING SEROLOGIC ABO: CPT | Performed by: STUDENT IN AN ORGANIZED HEALTH CARE EDUCATION/TRAINING PROGRAM

## 2023-08-19 PROCEDURE — 63600175 PHARM REV CODE 636 W HCPCS

## 2023-08-19 PROCEDURE — 85045 AUTOMATED RETICULOCYTE COUNT: CPT | Performed by: STUDENT IN AN ORGANIZED HEALTH CARE EDUCATION/TRAINING PROGRAM

## 2023-08-19 PROCEDURE — 63600175 PHARM REV CODE 636 W HCPCS: Performed by: INTERNAL MEDICINE

## 2023-08-19 PROCEDURE — 99900035 HC TECH TIME PER 15 MIN (STAT)

## 2023-08-19 PROCEDURE — 20600001 HC STEP DOWN PRIVATE ROOM

## 2023-08-19 PROCEDURE — 63600175 PHARM REV CODE 636 W HCPCS: Performed by: STUDENT IN AN ORGANIZED HEALTH CARE EDUCATION/TRAINING PROGRAM

## 2023-08-19 PROCEDURE — 86920 COMPATIBILITY TEST SPIN: CPT | Performed by: STUDENT IN AN ORGANIZED HEALTH CARE EDUCATION/TRAINING PROGRAM

## 2023-08-19 PROCEDURE — 36415 COLL VENOUS BLD VENIPUNCTURE: CPT

## 2023-08-19 PROCEDURE — 82607 VITAMIN B-12: CPT | Performed by: STUDENT IN AN ORGANIZED HEALTH CARE EDUCATION/TRAINING PROGRAM

## 2023-08-19 PROCEDURE — 25000003 PHARM REV CODE 250: Performed by: STUDENT IN AN ORGANIZED HEALTH CARE EDUCATION/TRAINING PROGRAM

## 2023-08-19 PROCEDURE — 83010 ASSAY OF HAPTOGLOBIN QUANT: CPT | Performed by: STUDENT IN AN ORGANIZED HEALTH CARE EDUCATION/TRAINING PROGRAM

## 2023-08-19 PROCEDURE — 85384 FIBRINOGEN ACTIVITY: CPT | Performed by: STUDENT IN AN ORGANIZED HEALTH CARE EDUCATION/TRAINING PROGRAM

## 2023-08-19 PROCEDURE — 80053 COMPREHEN METABOLIC PANEL: CPT

## 2023-08-19 PROCEDURE — 85025 COMPLETE CBC W/AUTO DIFF WBC: CPT | Performed by: STUDENT IN AN ORGANIZED HEALTH CARE EDUCATION/TRAINING PROGRAM

## 2023-08-19 PROCEDURE — 82746 ASSAY OF FOLIC ACID SERUM: CPT | Performed by: STUDENT IN AN ORGANIZED HEALTH CARE EDUCATION/TRAINING PROGRAM

## 2023-08-19 RX ORDER — ONDANSETRON 4 MG/1
4 TABLET, ORALLY DISINTEGRATING ORAL EVERY 6 HOURS PRN
Status: DISCONTINUED | OUTPATIENT
Start: 2023-08-19 | End: 2023-08-19

## 2023-08-19 RX ORDER — PROCHLORPERAZINE MALEATE 5 MG
5 TABLET ORAL 3 TIMES DAILY PRN
Status: DISCONTINUED | OUTPATIENT
Start: 2023-08-19 | End: 2023-09-01 | Stop reason: HOSPADM

## 2023-08-19 RX ORDER — DIPHENHYDRAMINE HYDROCHLORIDE 50 MG/ML
25 INJECTION INTRAMUSCULAR; INTRAVENOUS ONCE
Status: COMPLETED | OUTPATIENT
Start: 2023-08-19 | End: 2023-08-19

## 2023-08-19 RX ORDER — PROMETHAZINE HYDROCHLORIDE 12.5 MG/1
12.5 TABLET ORAL EVERY 6 HOURS PRN
Status: DISCONTINUED | OUTPATIENT
Start: 2023-08-19 | End: 2023-08-28

## 2023-08-19 RX ORDER — HYDROCODONE BITARTRATE AND ACETAMINOPHEN 500; 5 MG/1; MG/1
TABLET ORAL
Status: DISCONTINUED | OUTPATIENT
Start: 2023-08-19 | End: 2023-08-23

## 2023-08-19 RX ORDER — HYDROMORPHONE HYDROCHLORIDE 1 MG/ML
0.5 INJECTION, SOLUTION INTRAMUSCULAR; INTRAVENOUS; SUBCUTANEOUS EVERY 6 HOURS PRN
Status: DISCONTINUED | OUTPATIENT
Start: 2023-08-19 | End: 2023-09-01 | Stop reason: HOSPADM

## 2023-08-19 RX ORDER — PANTOPRAZOLE SODIUM 40 MG/10ML
40 INJECTION, POWDER, LYOPHILIZED, FOR SOLUTION INTRAVENOUS 2 TIMES DAILY
Status: DISCONTINUED | OUTPATIENT
Start: 2023-08-20 | End: 2023-08-23

## 2023-08-19 RX ORDER — PANTOPRAZOLE SODIUM 40 MG/10ML
80 INJECTION, POWDER, LYOPHILIZED, FOR SOLUTION INTRAVENOUS ONCE
Status: COMPLETED | OUTPATIENT
Start: 2023-08-19 | End: 2023-08-19

## 2023-08-19 RX ORDER — OXYCODONE HYDROCHLORIDE 5 MG/1
5 TABLET ORAL EVERY 6 HOURS PRN
Status: DISCONTINUED | OUTPATIENT
Start: 2023-08-19 | End: 2023-09-01 | Stop reason: HOSPADM

## 2023-08-19 RX ORDER — POTASSIUM CHLORIDE 20 MEQ/1
40 TABLET, EXTENDED RELEASE ORAL EVERY 4 HOURS
Status: COMPLETED | OUTPATIENT
Start: 2023-08-19 | End: 2023-08-19

## 2023-08-19 RX ORDER — ONDANSETRON 2 MG/ML
4 INJECTION INTRAMUSCULAR; INTRAVENOUS EVERY 6 HOURS PRN
Status: DISCONTINUED | OUTPATIENT
Start: 2023-08-19 | End: 2023-08-19

## 2023-08-19 RX ORDER — ACETAMINOPHEN 325 MG/1
650 TABLET ORAL EVERY 6 HOURS PRN
Status: DISCONTINUED | OUTPATIENT
Start: 2023-08-19 | End: 2023-08-22

## 2023-08-19 RX ADMIN — POTASSIUM CHLORIDE 40 MEQ: 1500 TABLET, EXTENDED RELEASE ORAL at 10:08

## 2023-08-19 RX ADMIN — Medication 400 MG: at 09:08

## 2023-08-19 RX ADMIN — Medication 400 MG: at 08:08

## 2023-08-19 RX ADMIN — POTASSIUM CHLORIDE 40 MEQ: 1500 TABLET, EXTENDED RELEASE ORAL at 05:08

## 2023-08-19 RX ADMIN — ESCITALOPRAM OXALATE 10 MG: 10 TABLET ORAL at 09:08

## 2023-08-19 RX ADMIN — Medication 24 G: at 07:08

## 2023-08-19 RX ADMIN — SPIRONOLACTONE 12.5 MG: 25 TABLET ORAL at 09:08

## 2023-08-19 RX ADMIN — ASPIRIN 81 MG: 81 TABLET, COATED ORAL at 09:08

## 2023-08-19 RX ADMIN — PANTOPRAZOLE SODIUM 80 MG: 40 INJECTION, POWDER, FOR SOLUTION INTRAVENOUS at 05:08

## 2023-08-19 RX ADMIN — ATORVASTATIN CALCIUM 40 MG: 40 TABLET, FILM COATED ORAL at 08:08

## 2023-08-19 RX ADMIN — CLOPIDOGREL BISULFATE 75 MG: 75 TABLET ORAL at 09:08

## 2023-08-19 RX ADMIN — GABAPENTIN 600 MG: 300 CAPSULE ORAL at 08:08

## 2023-08-19 RX ADMIN — Medication 9 MG: at 08:08

## 2023-08-19 RX ADMIN — ENOXAPARIN SODIUM 60 MG: 60 INJECTION SUBCUTANEOUS at 09:08

## 2023-08-19 RX ADMIN — METOPROLOL SUCCINATE 12.5 MG: 25 TABLET, EXTENDED RELEASE ORAL at 09:08

## 2023-08-19 RX ADMIN — LOSARTAN POTASSIUM 25 MG: 25 TABLET, FILM COATED ORAL at 09:08

## 2023-08-19 RX ADMIN — ACETAMINOPHEN 650 MG: 325 TABLET ORAL at 11:08

## 2023-08-19 RX ADMIN — DEXTROSE MONOHYDRATE 250 ML: 100 INJECTION, SOLUTION INTRAVENOUS at 08:08

## 2023-08-19 RX ADMIN — DIPHENHYDRAMINE HYDROCHLORIDE 25 MG: 50 INJECTION, SOLUTION INTRAMUSCULAR; INTRAVENOUS at 11:08

## 2023-08-19 RX ADMIN — GABAPENTIN 300 MG: 300 CAPSULE ORAL at 09:08

## 2023-08-19 NOTE — SUBJECTIVE & OBJECTIVE
Interval History: Became hypoglycemic on 8/19 and Hgb dropped from 8 to 6.3 without bleeding, recheck pending, insulin held, started on D10 gtt.    Review of Systems   Constitutional:  Positive for fatigue.   Respiratory:  Negative for shortness of breath.    Cardiovascular:  Negative for chest pain.   Gastrointestinal:  Positive for abdominal pain.   Genitourinary:  Negative for dysuria and frequency.   Neurological:  Negative for dizziness and headaches.     Objective:     Vital Signs (Most Recent):  Temp: 98.4 °F (36.9 °C) (08/19/23 0755)  Pulse: 88 (08/19/23 1102)  Resp: 18 (08/19/23 0755)  BP: (!) 113/57 (08/19/23 0755)  SpO2: 99 % (08/19/23 0755) Vital Signs (24h Range):  Temp:  [97.4 °F (36.3 °C)-98.4 °F (36.9 °C)] 98.4 °F (36.9 °C)  Pulse:  [70-88] 88  Resp:  [18] 18  SpO2:  [93 %-99 %] 99 %  BP: (101-128)/(51-64) 113/57     Weight: 56.1 kg (123 lb 10.9 oz)  Body mass index is 21.23 kg/m².    Intake/Output Summary (Last 24 hours) at 8/19/2023 1433  Last data filed at 8/19/2023 0947  Gross per 24 hour   Intake 240 ml   Output --   Net 240 ml           Physical Exam  Constitutional:       Appearance: She is ill-appearing.   HENT:      Head: Normocephalic and atraumatic.   Cardiovascular:      Rate and Rhythm: Normal rate and regular rhythm.      Pulses: Normal pulses.      Heart sounds: Normal heart sounds.   Pulmonary:      Effort: Pulmonary effort is normal.      Breath sounds: Normal breath sounds.   Abdominal:      General: Abdomen is flat.      Tenderness: There is abdominal tenderness.   Musculoskeletal:      Right lower leg: No edema.      Left lower leg: No edema.   Skin:     General: Skin is warm.      Capillary Refill: Capillary refill takes less than 2 seconds.      Coloration: Skin is not jaundiced or pale.   Neurological:      General: No focal deficit present.      Mental Status: She is alert and oriented to person, place, and time.   Psychiatric:         Mood and Affect: Mood normal.          "    Significant Labs: All pertinent labs within the past 24 hours have been reviewed.  BMP:   Recent Labs   Lab 08/19/23  0533   GLU 34*      K 3.1*      CO2 24   BUN 10   CREATININE 0.7   CALCIUM 7.9*   MG 2.5       CBC:   Recent Labs   Lab 08/18/23  0851 08/18/23  1317 08/19/23  0533   WBC 12.59 15.05* 10.63   HGB 6.3* 8.4* 6.4*   HCT 21.2* 27.8* 22.6*    291 262       CMP:   Recent Labs   Lab 08/18/23  0840 08/19/23  0533    144   K 3.8 3.1*    109   CO2 22* 24   * 34*   BUN 12 10   CREATININE 0.7 0.7   CALCIUM 8.0* 7.9*   PROT 6.9  6.9 6.2   ALBUMIN 1.8*  1.8* 1.6*   BILITOT 3.5*  3.5* 3.4*   ALKPHOS 837*  843* 752*   *  108* 114*   ALT 62*  62* 56*   ANIONGAP 13 11       Lactic Acid: No results for input(s): "LACTATE" in the last 48 hours.  Troponin: No results for input(s): "TROPONINI", "TROPONINIHS" in the last 48 hours.  Urine Culture: No results for input(s): "LABURIN" in the last 48 hours.  Urine Studies: No results for input(s): "COLORU", "APPEARANCEUA", "PHUR", "SPECGRAV", "PROTEINUA", "GLUCUA", "KETONESU", "BILIRUBINUA", "OCCULTUA", "NITRITE", "UROBILINOGEN", "LEUKOCYTESUR", "RBCUA", "WBCUA", "BACTERIA", "SQUAMEPITHEL", "HYALINECASTS" in the last 48 hours.    Invalid input(s): "WRIGHTSUR"    Significant Imaging: I have reviewed all pertinent imaging results/findings within the past 24 hours.  "

## 2023-08-19 NOTE — ASSESSMENT & PLAN NOTE
Patient with Paroxysmal (<7 days) atrial fibrillation which is controlled currently with Beta Blocker. Patient is currently in sinus rhythm.[id number] Score: 3. . Anticoagulation not indicated due to possible bleeding, was previously on lovenox but it is being held.

## 2023-08-19 NOTE — PROGRESS NOTES
[facility]  Progress Note    Patient Name: Mariann Huff  MRN: [id number]  Patient Class: IP- Inpatient   Admission Date: 8/14/2023  Length of Stay: 5 days  Attending Physician: Baudilio Lozada MD  Primary Care Provider: Jasbir Haney MD        Subjective:     Principal Problem:STEMI (ST elevation myocardial infarction)        HPI:  63 y.o. female with CAD s/p GINGER to ostial LAD in 2014, HTN, HLD, DM2, MURTAZA who presented to the ED to the ER after a near syncopal event at home around 4 pm. She also feels tired and her appetite lately has decreased. she had chest pain and visceral symptoms with nausea and vomiting. She reports losing weight as well.She has had abdominal symptoms with poor PO intake leading to orthostasis. Today she had another near syncopal event and is why she was brought to the ER. Here her ECG showed the posterior changes for which posterior EKG was obtained with showed STEMI. Bedside echo showed an EF 45-50% and norma-lateral and infero-lateral hypokinesis. She was taken to cath lab and her symptoms resolved after PCI. Please refer to PCI for full details.       Overview/Hospital Course:  Patient was taken to cath lab and her symptoms resolved after PCI. Patient chest pain resolved.     Patient reported development of acute abdominal pain associated with nausea and vomiting. Patient was recently admitted (6/23) for suspected biliary pancreatitis and biliary stricture treated with biliary sphincterotomy, biliary stent, and cholecystectomy.    Patient now has worsening transaminitis w/ elevated T-Bili.   Concern for stent occlusion. Patient amylase 107 wnl, and Lipase 61. Workup will include total abdominal ultrasound and [facility] ([facility]) was consulted . Was scheduled for outpatient w/u of hepatic lesions found on previous admission 8/2/23.  Patient had ERCP and EUS on 8/17/23. Visualized a partial occluded stent in the biliary tree. One covered  metal stent was placed into the common bile duct. Concern for metastatic cancer is high due to multiple liver lesions and mass on pancreatic head per AES. Initially on previous hospital admission, it was believed to more likely be liver abscesses rather than malignancy due to acute nature of progression. S/p FNA biopsy with ERCP and stent placement on 8/17.     Became hypoglycemic on 8/19 and Hgb dropped from 8 to 6.3 without bleeding, recheck pending, insulin held, started on D10 gtt.      Interval History: Became hypoglycemic on 8/19 and Hgb dropped from 8 to 6.3 without bleeding, recheck pending, insulin held, started on D10 gtt.    Review of Systems   Constitutional:  Positive for fatigue.   Respiratory:  Negative for shortness of breath.    Cardiovascular:  Negative for chest pain.   Gastrointestinal:  Positive for abdominal pain.   Genitourinary:  Negative for dysuria and frequency.   Neurological:  Negative for dizziness and headaches.     Objective:     Vital Signs (Most Recent):  Temp: 98.4 °F (36.9 °C) (08/19/23 0755)  Pulse: 88 (08/19/23 1102)  Resp: 18 (08/19/23 0755)  BP: (!) 113/57 (08/19/23 0755)  SpO2: 99 % (08/19/23 0755) Vital Signs (24h Range):  Temp:  [97.4 °F (36.3 °C)-98.4 °F (36.9 °C)] 98.4 °F (36.9 °C)  Pulse:  [70-88] 88  Resp:  [18] 18  SpO2:  [93 %-99 %] 99 %  BP: (101-128)/(51-64) 113/57     Weight: 56.1 kg (123 lb 10.9 oz)  Body mass index is 21.23 kg/m².    Intake/Output Summary (Last 24 hours) at 8/19/2023 1433  Last data filed at 8/19/2023 0947  Gross per 24 hour   Intake 240 ml   Output --   Net 240 ml           Physical Exam  Constitutional:       Appearance: She is ill-appearing.   HENT:      Head: Normocephalic and atraumatic.   Cardiovascular:      Rate and Rhythm: Normal rate and regular rhythm.      Pulses: Normal pulses.      Heart sounds: Normal heart sounds.   Pulmonary:      Effort: Pulmonary effort is normal.      Breath sounds: Normal breath sounds.   Abdominal:       "General: Abdomen is flat.      Tenderness: There is abdominal tenderness.   Musculoskeletal:      Right lower leg: No edema.      Left lower leg: No edema.   Skin:     General: Skin is warm.      Capillary Refill: Capillary refill takes less than 2 seconds.      Coloration: Skin is not jaundiced or pale.   Neurological:      General: No focal deficit present.      Mental Status: She is alert and oriented to person, place, and time.   Psychiatric:         Mood and Affect: Mood normal.             Significant Labs: All pertinent labs within the past 24 hours have been reviewed.  BMP:   Recent Labs   Lab 08/19/23  0533   GLU 34*      K 3.1*      CO2 24   BUN 10   CREATININE 0.7   CALCIUM 7.9*   MG 2.5       CBC:   Recent Labs   Lab 08/18/23  0851 08/18/23  1317 08/19/23  0533   WBC 12.59 15.05* 10.63   HGB 6.3* 8.4* 6.4*   HCT 21.2* 27.8* 22.6*    291 262       CMP:   Recent Labs   Lab 08/18/23  0840 08/19/23  0533    144   K 3.8 3.1*    109   CO2 22* 24   * 34*   BUN 12 10   CREATININE 0.7 0.7   CALCIUM 8.0* 7.9*   PROT 6.9  6.9 6.2   ALBUMIN 1.8*  1.8* 1.6*   BILITOT 3.5*  3.5* 3.4*   ALKPHOS 837*  843* 752*   *  108* 114*   ALT 62*  62* 56*   ANIONGAP 13 11       Lactic Acid: No results for input(s): "LACTATE" in the last 48 hours.  Troponin: No results for input(s): "TROPONINI", "TROPONINIHS" in the last 48 hours.  Urine Culture: No results for input(s): "LABURIN" in the last 48 hours.  Urine Studies: No results for input(s): "COLORU", "APPEARANCEUA", "PHUR", "SPECGRAV", "PROTEINUA", "GLUCUA", "KETONESU", "BILIRUBINUA", "OCCULTUA", "NITRITE", "UROBILINOGEN", "LEUKOCYTESUR", "RBCUA", "WBCUA", "BACTERIA", "SQUAMEPITHEL", "HYALINECASTS" in the last 48 hours.    Invalid input(s): "WRIGHTSUR"    Significant Imaging: I have reviewed all pertinent imaging results/findings within the past 24 hours.      Assessment/Plan:      * STEMI (ST elevation myocardial " infarction)  -Started having near syncope around 4 pm on the day of admission  -Bedside echo in the ER showed an EF 45-50% and norma-lateral and infero-lateral hypokinesis  -She underwent Successful primary PCI of ramus intermedius and lateral branch with Pronto thrombectomy and 3X16 GINGER     Plan  - got 750 cc in the lab and additional IVF @ 100 cc/hr x 12 hours, LVEDP 9 and appeared volume down (was discharged on lasix 40 daily for ADHF, might have to discharge home this time on 20 daily lasix)  - Bed rest x 4 hours   - Continue aspirin 81 mg daily, stop after 1 month to avoid triple therapy  - Loaded with brilinta 180, loaded with plavix 600 8/15/23, and 75 qd there after  - Hold Eliquis for a fib, using Lovenox  - Continue high intensity statin therapy (LDL goal < 70)  - TTE shows ejection fraction of about 40% associated with grade II diastolic dysfunction.     - Upon discharge, patient will continue aspirin and plavix  - Will likely dc on Lovenox due to recurrent hospitalizations that may require acute procedural interventions      Atrial fibrillation  Patient with Paroxysmal (<7 days) atrial fibrillation which is controlled currently with Beta Blocker. Patient is currently in sinus rhythm.[id number] Score: 3. . Anticoagulation not indicated due to possible bleeding, was previously on lovenox but it is being held.    Biliary obstruction  Patient reports development of acute abdominal pain associated with nausea and vomiting. Patient was recently admitted (6/23) for suspected biliary pancreatitis and biliary stricture treated with biliary sphincterotomy, biliary stent, and cholecystectomy.      - worsening transaminitis w/ elevated TB, Concern for stent occlusion  - Patient amylase 107 wnl, and Lipase 61  -AES consulted, s/p ERCP w/ biopsy on 8/17, transaminases and bili improving       Abdominal pain  See biliary obstruction, pain is stable      CHF (congestive heart failure)  -Last week had CXR with B/L  edema, , EF decreased to 40-45%  -Got IV lasix while inpatient and was discharged on 40 po daily however became dehydrated and weak and LVEDP -in the lab showed LVEDP 9, reduce dose to 20 po  - Continue losartan, jarad, and metoprolol, resume SGLT at dc    Liver lesion  Noted on prior admissions c/f metastatic disease with pancreatic primary, biopsy done 8/17, pending results      Other specified anemias  During this admission hgb has been about 8, no signs of bleeding. On 8/19 dropped to 6.3 but no signs of bleeding, all lines diluted, suspect lab error, repeat pending, will continue DAPT and if transfusions are needed will aim for Hgb >8 due to recent STEMI      History of pancreatitis  Recent (6/2023) suspected biliary pancreatitis and biliary stricture treated with biliary sphincterotomy, biliary stent, and cholecystectomy.  CT A/P with new innumerable hepatic lesions:  1. Innumerable low attenuating hepatic lesions, new since the previous exam.  Malignancy remains a concern however given short-term development, correlation is needed to exclude multifocal infection/abscess in this patient status post bile duct stent placement and cholecystectomy.  Please note, there is a low attenuating focus within the gallbladder fossa, similar to the foci throughout the hepatic parenchyma..  2. Pancreatic ductal dilatation up to 4 mm, minimally increased compared to prior.  There is fullness of the pancreatic head, underlying mass is not excluded, correlation with recent ERCP advised.  3. Simple and complex bilateral renal cysts.  4. Biliary stent in place with expected pneumobilia.  5. Cholecystectomy with scattered fluid about the gallbladder fossa.  6. Moderate stool throughout the colon, may reflect developing constipation.  7.  Additional findings as above.     - concern for lesions seen on CT A/P  - Patient was found to have a hypoechoic pancreatic mass on abdominal US  - Was also found to have this mass on EUS  concerning for malignancy   - EUS and ERCP 8/17/23, found to have a partially occluded stent. Bare metal stent placed in CBD  -  Multiple lesions seen on previous admit have been visualized. Per AES more concerning for metastatic cancer  - FNA biopsy of lesions, F/U with cytology results   - Advancing diet to regular diet  - Stopping abx    Type 2 diabetes mellitus with stage 2 chronic kidney disease and hypertension  Home Antihyperglycemic Regiment:  -Farxiga, glipizide, insulin daily at home  - Titrate and addition of insulin as needed for BG goal 140-180  - SSI with POCT accuchecks ACHS and Diabetic diet 2000 beti  - Diabetic nutritional counseling given   - Continue home gabapentin for diabetic peripheral neuropathy  - Appreciate Endocrinology recs  - Adjust insulin and monitor closely  - 8/19 uncontrolled, am glucose as low as 28, insulin held endocrine adjusting, and started on d10 gtt.      VTE Risk Mitigation (From admission, onward)           Ordered     enoxaparin injection 60 mg  Every 12 hours         08/15/23 1044                    Discharge Planning   REBECCA: 8/19/2023     Code Status: Full Code   Is the patient medically ready for discharge?: No    Reason for patient still in hospital (select all that apply): Patient trending condition and Treatment  Discharge Plan A: Home with family, Home Health                  Baudilio Lozada MD  Department of Hospital Medicine   [facility] Step[facility]

## 2023-08-19 NOTE — ASSESSMENT & PLAN NOTE
Home Antihyperglycemic Regiment:  -Farxiga, glipizide, insulin daily at home  - Titrate and addition of insulin as needed for BG goal 140-180  - SSI with POCT accuchecks ACHS and Diabetic diet 2000 beti  - Diabetic nutritional counseling given   - Continue home gabapentin for diabetic peripheral neuropathy  - Appreciate Endocrinology recs  - Adjust insulin and monitor closely  - 8/19 uncontrolled, am glucose as low as 28, insulin held endocrine adjusting, and started on d10 gtt.

## 2023-08-19 NOTE — ASSESSMENT & PLAN NOTE
During this admission hgb has been about 8, no signs of bleeding. On 8/19 dropped to 6.3 but no signs of bleeding, all lines diluted, suspect lab error, repeat pending, will continue DAPT and if transfusions are needed will aim for Hgb >8 due to recent STEMI

## 2023-08-20 PROBLEM — D64.9 ANEMIA: Status: ACTIVE | Noted: 2023-08-20

## 2023-08-20 PROBLEM — K92.1 GASTROINTESTINAL HEMORRHAGE WITH MELENA: Status: ACTIVE | Noted: 2023-08-20

## 2023-08-20 LAB
ALBUMIN SERPL BCP-MCNC: 1.5 G/DL (ref 3.5–5.2)
ALBUMIN SERPL BCP-MCNC: 1.5 G/DL (ref 3.5–5.2)
ALP SERPL-CCNC: 701 U/L (ref 55–135)
ALP SERPL-CCNC: 726 U/L (ref 55–135)
ALT SERPL W/O P-5'-P-CCNC: 66 U/L (ref 10–44)
ALT SERPL W/O P-5'-P-CCNC: 67 U/L (ref 10–44)
ANION GAP SERPL CALC-SCNC: 10 MMOL/L (ref 8–16)
ANION GAP SERPL CALC-SCNC: 12 MMOL/L (ref 8–16)
APTT PPP: 33.2 SEC (ref 21–32)
AST SERPL-CCNC: 152 U/L (ref 10–40)
AST SERPL-CCNC: 153 U/L (ref 10–40)
BASOPHILS # BLD AUTO: 0.01 K/UL (ref 0–0.2)
BASOPHILS # BLD AUTO: 0.02 K/UL (ref 0–0.2)
BASOPHILS NFR BLD: 0.1 % (ref 0–1.9)
BASOPHILS NFR BLD: 0.2 % (ref 0–1.9)
BILIRUB SERPL-MCNC: 4 MG/DL (ref 0.1–1)
BILIRUB SERPL-MCNC: 4.1 MG/DL (ref 0.1–1)
BNP SERPL-MCNC: 1212 PG/ML (ref 0–99)
BUN SERPL-MCNC: 10 MG/DL (ref 8–23)
BUN SERPL-MCNC: 9 MG/DL (ref 8–23)
CALCIUM SERPL-MCNC: 7.6 MG/DL (ref 8.7–10.5)
CALCIUM SERPL-MCNC: 7.6 MG/DL (ref 8.7–10.5)
CHLORIDE SERPL-SCNC: 109 MMOL/L (ref 95–110)
CHLORIDE SERPL-SCNC: 109 MMOL/L (ref 95–110)
CO2 SERPL-SCNC: 24 MMOL/L (ref 23–29)
CO2 SERPL-SCNC: 25 MMOL/L (ref 23–29)
CREAT SERPL-MCNC: 0.7 MG/DL (ref 0.5–1.4)
CREAT SERPL-MCNC: 0.7 MG/DL (ref 0.5–1.4)
DIFFERENTIAL METHOD: ABNORMAL
DIFFERENTIAL METHOD: ABNORMAL
EOSINOPHIL # BLD AUTO: 0.2 K/UL (ref 0–0.5)
EOSINOPHIL # BLD AUTO: 0.3 K/UL (ref 0–0.5)
EOSINOPHIL NFR BLD: 2.4 % (ref 0–8)
EOSINOPHIL NFR BLD: 3.6 % (ref 0–8)
ERYTHROCYTE [DISTWIDTH] IN BLOOD BY AUTOMATED COUNT: 16.2 % (ref 11.5–14.5)
ERYTHROCYTE [DISTWIDTH] IN BLOOD BY AUTOMATED COUNT: 16.4 % (ref 11.5–14.5)
EST. GFR  (NO RACE VARIABLE): >60 ML/MIN/1.73 M^2
EST. GFR  (NO RACE VARIABLE): >60 ML/MIN/1.73 M^2
GLUCOSE SERPL-MCNC: 77 MG/DL (ref 70–110)
GLUCOSE SERPL-MCNC: 80 MG/DL (ref 70–110)
HCT VFR BLD AUTO: 23.2 % (ref 37–48.5)
HCT VFR BLD AUTO: 29.2 % (ref 37–48.5)
HGB BLD-MCNC: 7.1 G/DL (ref 12–16)
HGB BLD-MCNC: 9.1 G/DL (ref 12–16)
IMM GRANULOCYTES # BLD AUTO: 0.09 K/UL (ref 0–0.04)
IMM GRANULOCYTES # BLD AUTO: 0.14 K/UL (ref 0–0.04)
IMM GRANULOCYTES NFR BLD AUTO: 0.9 % (ref 0–0.5)
IMM GRANULOCYTES NFR BLD AUTO: 1.5 % (ref 0–0.5)
INR PPP: 1.1 (ref 0.8–1.2)
LYMPHOCYTES # BLD AUTO: 0.9 K/UL (ref 1–4.8)
LYMPHOCYTES # BLD AUTO: 1.1 K/UL (ref 1–4.8)
LYMPHOCYTES NFR BLD: 11 % (ref 18–48)
LYMPHOCYTES NFR BLD: 9.7 % (ref 18–48)
MCH RBC QN AUTO: 25.3 PG (ref 27–31)
MCH RBC QN AUTO: 25.7 PG (ref 27–31)
MCHC RBC AUTO-ENTMCNC: 30.6 G/DL (ref 32–36)
MCHC RBC AUTO-ENTMCNC: 31.2 G/DL (ref 32–36)
MCV RBC AUTO: 83 FL (ref 82–98)
MCV RBC AUTO: 83 FL (ref 82–98)
MONOCYTES # BLD AUTO: 0.8 K/UL (ref 0.3–1)
MONOCYTES # BLD AUTO: 0.9 K/UL (ref 0.3–1)
MONOCYTES NFR BLD: 7.9 % (ref 4–15)
MONOCYTES NFR BLD: 8.9 % (ref 4–15)
NEUTROPHILS # BLD AUTO: 7.4 K/UL (ref 1.8–7.7)
NEUTROPHILS # BLD AUTO: 7.8 K/UL (ref 1.8–7.7)
NEUTROPHILS NFR BLD: 76.6 % (ref 38–73)
NEUTROPHILS NFR BLD: 77.2 % (ref 38–73)
NRBC BLD-RTO: 0 /100 WBC
NRBC BLD-RTO: 0 /100 WBC
PLATELET # BLD AUTO: 238 K/UL (ref 150–450)
PLATELET # BLD AUTO: 266 K/UL (ref 150–450)
PMV BLD AUTO: 12 FL (ref 9.2–12.9)
PMV BLD AUTO: 12.5 FL (ref 9.2–12.9)
POCT GLUCOSE: 125 MG/DL (ref 70–110)
POCT GLUCOSE: 141 MG/DL (ref 70–110)
POCT GLUCOSE: 217 MG/DL (ref 70–110)
POCT GLUCOSE: 69 MG/DL (ref 70–110)
POTASSIUM SERPL-SCNC: 3.4 MMOL/L (ref 3.5–5.1)
POTASSIUM SERPL-SCNC: 3.5 MMOL/L (ref 3.5–5.1)
PROT SERPL-MCNC: 6 G/DL (ref 6–8.4)
PROT SERPL-MCNC: 6.2 G/DL (ref 6–8.4)
PROTHROMBIN TIME: 11.7 SEC (ref 9–12.5)
RBC # BLD AUTO: 2.81 M/UL (ref 4–5.4)
RBC # BLD AUTO: 3.54 M/UL (ref 4–5.4)
SODIUM SERPL-SCNC: 144 MMOL/L (ref 136–145)
SODIUM SERPL-SCNC: 145 MMOL/L (ref 136–145)
WBC # BLD AUTO: 10.19 K/UL (ref 3.9–12.7)
WBC # BLD AUTO: 9.57 K/UL (ref 3.9–12.7)

## 2023-08-20 PROCEDURE — 94660 CPAP INITIATION&MGMT: CPT

## 2023-08-20 PROCEDURE — 99223 PR INITIAL HOSPITAL CARE,LEVL III: ICD-10-PCS | Mod: GC,,, | Performed by: INTERNAL MEDICINE

## 2023-08-20 PROCEDURE — 36415 COLL VENOUS BLD VENIPUNCTURE: CPT | Performed by: INTERNAL MEDICINE

## 2023-08-20 PROCEDURE — 63600175 PHARM REV CODE 636 W HCPCS: Performed by: STUDENT IN AN ORGANIZED HEALTH CARE EDUCATION/TRAINING PROGRAM

## 2023-08-20 PROCEDURE — 99900035 HC TECH TIME PER 15 MIN (STAT)

## 2023-08-20 PROCEDURE — 99233 SBSQ HOSP IP/OBS HIGH 50: CPT | Mod: ,,, | Performed by: STUDENT IN AN ORGANIZED HEALTH CARE EDUCATION/TRAINING PROGRAM

## 2023-08-20 PROCEDURE — 85025 COMPLETE CBC W/AUTO DIFF WBC: CPT | Mod: 91 | Performed by: STUDENT IN AN ORGANIZED HEALTH CARE EDUCATION/TRAINING PROGRAM

## 2023-08-20 PROCEDURE — 99233 PR SUBSEQUENT HOSPITAL CARE,LEVL III: ICD-10-PCS | Mod: ,,, | Performed by: STUDENT IN AN ORGANIZED HEALTH CARE EDUCATION/TRAINING PROGRAM

## 2023-08-20 PROCEDURE — 80053 COMPREHEN METABOLIC PANEL: CPT | Mod: 91 | Performed by: INTERNAL MEDICINE

## 2023-08-20 PROCEDURE — 94761 N-INVAS EAR/PLS OXIMETRY MLT: CPT

## 2023-08-20 PROCEDURE — 99232 SBSQ HOSP IP/OBS MODERATE 35: CPT | Mod: ,,, | Performed by: NURSE PRACTITIONER

## 2023-08-20 PROCEDURE — 99232 PR SUBSEQUENT HOSPITAL CARE,LEVL II: ICD-10-PCS | Mod: ,,, | Performed by: NURSE PRACTITIONER

## 2023-08-20 PROCEDURE — 20600001 HC STEP DOWN PRIVATE ROOM

## 2023-08-20 PROCEDURE — 80053 COMPREHEN METABOLIC PANEL: CPT

## 2023-08-20 PROCEDURE — 36415 COLL VENOUS BLD VENIPUNCTURE: CPT | Performed by: STUDENT IN AN ORGANIZED HEALTH CARE EDUCATION/TRAINING PROGRAM

## 2023-08-20 PROCEDURE — 99223 1ST HOSP IP/OBS HIGH 75: CPT | Mod: GC,,, | Performed by: INTERNAL MEDICINE

## 2023-08-20 PROCEDURE — 83880 ASSAY OF NATRIURETIC PEPTIDE: CPT | Performed by: STUDENT IN AN ORGANIZED HEALTH CARE EDUCATION/TRAINING PROGRAM

## 2023-08-20 PROCEDURE — 36415 COLL VENOUS BLD VENIPUNCTURE: CPT

## 2023-08-20 PROCEDURE — C9113 INJ PANTOPRAZOLE SODIUM, VIA: HCPCS | Performed by: STUDENT IN AN ORGANIZED HEALTH CARE EDUCATION/TRAINING PROGRAM

## 2023-08-20 PROCEDURE — 85610 PROTHROMBIN TIME: CPT | Performed by: INTERNAL MEDICINE

## 2023-08-20 PROCEDURE — 85730 THROMBOPLASTIN TIME PARTIAL: CPT | Performed by: INTERNAL MEDICINE

## 2023-08-20 PROCEDURE — 25000003 PHARM REV CODE 250: Performed by: INTERNAL MEDICINE

## 2023-08-20 RX ORDER — FUROSEMIDE 10 MG/ML
40 INJECTION INTRAMUSCULAR; INTRAVENOUS ONCE
Status: COMPLETED | OUTPATIENT
Start: 2023-08-20 | End: 2023-08-20

## 2023-08-20 RX ORDER — FUROSEMIDE 10 MG/ML
20 INJECTION INTRAMUSCULAR; INTRAVENOUS ONCE
Status: COMPLETED | OUTPATIENT
Start: 2023-08-20 | End: 2023-08-20

## 2023-08-20 RX ADMIN — ESCITALOPRAM OXALATE 10 MG: 10 TABLET ORAL at 09:08

## 2023-08-20 RX ADMIN — SPIRONOLACTONE 12.5 MG: 25 TABLET ORAL at 09:08

## 2023-08-20 RX ADMIN — Medication 9 MG: at 09:08

## 2023-08-20 RX ADMIN — GABAPENTIN 300 MG: 300 CAPSULE ORAL at 09:08

## 2023-08-20 RX ADMIN — ATORVASTATIN CALCIUM 40 MG: 40 TABLET, FILM COATED ORAL at 09:08

## 2023-08-20 RX ADMIN — FUROSEMIDE 20 MG: 10 INJECTION, SOLUTION INTRAVENOUS at 09:08

## 2023-08-20 RX ADMIN — Medication 400 MG: at 09:08

## 2023-08-20 RX ADMIN — FUROSEMIDE 40 MG: 10 INJECTION, SOLUTION INTRAVENOUS at 05:08

## 2023-08-20 RX ADMIN — INSULIN ASPART 4 UNITS: 100 INJECTION, SOLUTION INTRAVENOUS; SUBCUTANEOUS at 05:08

## 2023-08-20 RX ADMIN — LOSARTAN POTASSIUM 25 MG: 25 TABLET, FILM COATED ORAL at 09:08

## 2023-08-20 RX ADMIN — GABAPENTIN 600 MG: 300 CAPSULE ORAL at 09:08

## 2023-08-20 RX ADMIN — PANTOPRAZOLE SODIUM 40 MG: 40 INJECTION, POWDER, FOR SOLUTION INTRAVENOUS at 09:08

## 2023-08-20 RX ADMIN — METOPROLOL SUCCINATE 12.5 MG: 25 TABLET, EXTENDED RELEASE ORAL at 09:08

## 2023-08-20 RX ADMIN — CLOPIDOGREL BISULFATE 75 MG: 75 TABLET ORAL at 09:08

## 2023-08-20 RX ADMIN — ASPIRIN 81 MG: 81 TABLET, COATED ORAL at 09:08

## 2023-08-20 NOTE — PROGRESS NOTES
[facility] - Cardiology [facility] Medicine  Progress Note    Patient Name: Mariann Huff  MRN: [id number]  Patient Class: IP- Inpatient   Admission Date: 8/14/2023  Length of Stay: 6 days  Attending Physician: Baudilio Lozada MD  Primary Care Provider: Jasbir Haney MD        Subjective:     Principal Problem:STEMI (ST elevation myocardial infarction)        HPI:  63 y.o. female with CAD s/p GINGER to ostial LAD in 2014, HTN, HLD, DM2, MURTAZA who presented to the ED to the ER after a near syncopal event at home around 4 pm. She also feels tired and her appetite lately has decreased. she had chest pain and visceral symptoms with nausea and vomiting. She reports losing weight as well.She has had abdominal symptoms with poor PO intake leading to orthostasis. Today she had another near syncopal event and is why she was brought to the ER. Here her ECG showed the posterior changes for which posterior EKG was obtained with showed STEMI. Bedside echo showed an EF 45-50% and norma-lateral and infero-lateral hypokinesis. She was taken to cath lab and her symptoms resolved after PCI. Please refer to PCI for full details.       Overview/Hospital Course:  Patient was taken to cath lab and her symptoms resolved after PCI. Patient chest pain resolved.     Patient reported development of acute abdominal pain associated with nausea and vomiting. Patient was recently admitted (6/23) for suspected biliary pancreatitis and biliary stricture treated with biliary sphincterotomy, biliary stent, and cholecystectomy.    Patient now has worsening transaminitis w/ elevated T-Bili.   Concern for stent occlusion. Patient amylase 107 wnl, and Lipase 61. Workup will include total abdominal ultrasound and [facility] was consulted . Was scheduled for outpatient w/u of hepatic lesions found on previous admission 8/2/23.  Patient had ERCP and EUS on 8/17/23. Visualized a partial occluded stent in the biliary tree. One covered  metal stent was placed into the common bile duct. Concern for metastatic cancer is high due to multiple liver lesions and mass on pancreatic head per AES. Initially on previous hospital admission, it was believed to more likely be liver abscesses rather than malignancy due to acute nature of progression. S/p FNA biopsy with ERCP and stent placement on 8/17.     Became hypoglycemic on 8/19 and Hgb dropped from 8 to 6.3 with a reported dark stool, Improved to 7.1 after 1 unit PRBC, could not get a second unit due to respiratory distress, no further episodes of melena. Seen by GI, did not recommend endoscopy at this time. Given lasix 20mg IV      Interval History: Became hypoglycemic on 8/19 and Hgb dropped from 8 to 6.3 without bleeding, recheck pending, insulin held, started on D10 gtt.    Review of Systems   Constitutional:  Positive for fatigue.   Respiratory:  Negative for shortness of breath.    Cardiovascular:  Negative for chest pain.   Gastrointestinal:  Positive for abdominal pain.   Genitourinary:  Negative for dysuria and frequency.   Neurological:  Negative for dizziness and headaches.     Objective:     Vital Signs (Most Recent):  Temp: 97 °F (36.1 °C) (08/20/23 1227)  Pulse: 87 (08/20/23 1227)  Resp: 18 (08/20/23 1227)  BP: 136/62 (08/20/23 1227)  SpO2: 98 % (08/20/23 1227) Vital Signs (24h Range):  Temp:  [96.5 °F (35.8 °C)-98.5 °F (36.9 °C)] 97 °F (36.1 °C)  Pulse:  [68-96] 87  Resp:  [18-26] 18  SpO2:  [85 %-99 %] 98 %  BP: ()/(56-79) 136/62     Weight: 56.1 kg (123 lb 10.9 oz)  Body mass index is 21.23 kg/m².    Intake/Output Summary (Last 24 hours) at 8/20/2023 1359  Last data filed at 8/19/2023 2215  Gross per 24 hour   Intake 220.83 ml   Output 500 ml   Net -279.17 ml           Physical Exam  Constitutional:       Appearance: She is ill-appearing.   HENT:      Head: Normocephalic and atraumatic.   Cardiovascular:      Rate and Rhythm: Normal rate and regular rhythm.      Pulses: Normal  "pulses.      Heart sounds: Normal heart sounds.   Pulmonary:      Effort: Pulmonary effort is normal.      Comments: Crackles in all lung fields  Abdominal:      General: Abdomen is flat.      Tenderness: There is abdominal tenderness.   Musculoskeletal:      Right lower leg: No edema.      Left lower leg: No edema.   Skin:     General: Skin is warm.      Capillary Refill: Capillary refill takes less than 2 seconds.      Coloration: Skin is not jaundiced or pale.   Neurological:      General: No focal deficit present.      Mental Status: She is alert and oriented to person, place, and time.   Psychiatric:         Mood and Affect: Mood normal.             Significant Labs: All pertinent labs within the past 24 hours have been reviewed.  BMP:   Recent Labs   Lab 08/19/23  0533 08/20/23  0444 08/20/23  0600   GLU 34*   < > 77      < > 144   K 3.1*   < > 3.5      < > 109   CO2 24   < > 25   BUN 10   < > 9   CREATININE 0.7   < > 0.7   CALCIUM 7.9*   < > 7.6*   MG 2.5  --   --     < > = values in this interval not displayed.       CBC:   Recent Labs   Lab 08/19/23  0533 08/19/23  1540 08/20/23  0444   WBC 10.63 10.43 9.57   HGB 6.4* 6.1* 7.1*   HCT 22.6* 20.3* 23.2*    262 266       CMP:   Recent Labs   Lab 08/19/23  0533 08/20/23  0444 08/20/23  0600    145 144   K 3.1* 3.4* 3.5    109 109   CO2 24 24 25   GLU 34* 80 77   BUN 10 10 9   CREATININE 0.7 0.7 0.7   CALCIUM 7.9* 7.6* 7.6*   PROT 6.2 6.2 6.0   ALBUMIN 1.6* 1.5* 1.5*   BILITOT 3.4* 4.1* 4.0*   ALKPHOS 752* 726* 701*   * 153* 152*   ALT 56* 67* 66*   ANIONGAP 11 12 10       Lactic Acid: No results for input(s): "LACTATE" in the last 48 hours.  Troponin: No results for input(s): "TROPONINI", "TROPONINIHS" in the last 48 hours.  Urine Culture: No results for input(s): "LABURIN" in the last 48 hours.  Urine Studies: No results for input(s): "COLORU", "APPEARANCEUA", "PHUR", "SPECGRAV", "PROTEINUA", "GLUCUA", "KETONESU", " ""BILIRUBINUA", "OCCULTUA", "NITRITE", "UROBILINOGEN", "LEUKOCYTESUR", "RBCUA", "WBCUA", "BACTERIA", "SQUAMEPITHEL", "HYALINECASTS" in the last 48 hours.    Invalid input(s): "OBI"    Significant Imaging: I have reviewed all pertinent imaging results/findings within the past 24 hours.      Assessment/Plan:      * STEMI (ST elevation myocardial infarction)  -Started having near syncope around 4 pm on the day of admission  -Bedside echo in the ER showed an EF 45-50% and norma-lateral and infero-lateral hypokinesis  -She underwent Successful primary PCI of ramus intermedius and lateral branch with Pronto thrombectomy and 3X16 GINGER     Plan  - got 750 cc in the lab and additional IVF @ 100 cc/hr x 12 hours, LVEDP 9 and appeared volume down (was discharged on lasix 40 daily for ADHF, might have to discharge home this time on 20 daily lasix)  - Bed rest x 4 hours   - Continue aspirin 81 mg daily, stop after 1 month to avoid triple therapy  - Loaded with brilinta 180, loaded with plavix 600 8/15/23, and 75 qd there after  - Hold Eliquis for a fib, using Lovenox  - Continue high intensity statin therapy (LDL goal < 70)  - TTE shows ejection fraction of about 40% associated with grade II diastolic dysfunction.  - Start lasix 20mg IV daily, will reassess and may incrased to 40mg daily based on response     - Upon discharge, patient will continue aspirin and plavix  - Will likely dc on Lovenox due to recurrent hospitalizations that may require acute procedural interventions      Gastrointestinal hemorrhage with melena  The patient's presentation is concerning for an upper GI bleed. Hgb drop from 8 to 6.1, improved to 7.1 after 1 unit, was unable to tolerate further transfusions due to increasing oxygen requirement/ The patient does not have a history of cirrhosis.    Plan  - 2 large bore IVs  - Type and screen  - Hgb goal >8, but will defer further transfusion for now due to volume overload  - Correct coagulopathy (goal " plt >50, INR <1.5) if present in patients without absolute contraindications.  - Hold lovenox but continue DAPT due to recent STEMI  - CBC Q8H  - INR  -   - s/p 1 unit PRBC  - GI consulted  -  S/p 80mg IV pantoprazole  - Pantoprazole 40mg IV BID      Atrial fibrillation  Patient with Paroxysmal (<7 days) atrial fibrillation which is controlled currently with Beta Blocker. Patient is currently in sinus rhythm.[id number] Score: 3. . Anticoagulation not indicated due to possible bleeding, was previously on lovenox but it is being held.    Biliary obstruction  Patient reports development of acute abdominal pain associated with nausea and vomiting. Patient was recently admitted (6/23) for suspected biliary pancreatitis and biliary stricture treated with biliary sphincterotomy, biliary stent, and cholecystectomy.      - worsening transaminitis w/ elevated TB, Concern for stent occlusion  - Patient amylase 107 wnl, and Lipase 61  -AES consulted, s/p ERCP w/ biopsy on 8/17, transaminases and bili improving       Abdominal pain  See biliary obstruction, pain is stable      CHF (congestive heart failure)  -Last week had CXR with B/L edema, , EF decreased to 40-45%  -Got IV lasix while inpatient and was discharged on 40 po daily however became dehydrated and weak and LVEDP -in the lab showed LVEDP 9, reduce dose to 20 po  - Continue losartan, jarad, and metoprolol, resume SGLT at dc  - Lasix 40mg IV daily for volume overload from transfusion    Liver lesion  Noted on prior admissions c/f metastatic disease with pancreatic primary, biopsy done 8/17, pending results      Other specified anemias  See GIB above      History of pancreatitis  Recent (6/2023) suspected biliary pancreatitis and biliary stricture treated with biliary sphincterotomy, biliary stent, and cholecystectomy.  CT A/P with new innumerable hepatic lesions:  1. Innumerable low attenuating hepatic lesions, new since the previous exam.  Malignancy remains a  concern however given short-term development, correlation is needed to exclude multifocal infection/abscess in this patient status post bile duct stent placement and cholecystectomy.  Please note, there is a low attenuating focus within the gallbladder fossa, similar to the foci throughout the hepatic parenchyma..  2. Pancreatic ductal dilatation up to 4 mm, minimally increased compared to prior.  There is fullness of the pancreatic head, underlying mass is not excluded, correlation with recent ERCP advised.  3. Simple and complex bilateral renal cysts.  4. Biliary stent in place with expected pneumobilia.  5. Cholecystectomy with scattered fluid about the gallbladder fossa.  6. Moderate stool throughout the colon, may reflect developing constipation.  7.  Additional findings as above.     - concern for lesions seen on CT A/P  - Patient was found to have a hypoechoic pancreatic mass on abdominal US  - Was also found to have this mass on EUS concerning for malignancy   - EUS and ERCP 8/17/23, found to have a partially occluded stent. Bare metal stent placed in CBD  -  Multiple lesions seen on previous admit have been visualized. Per AES more concerning for metastatic cancer  - FNA biopsy of lesions, F/U with cytology results   - Advancing diet to regular diet  - Stopping abx    Type 2 diabetes mellitus with stage 2 chronic kidney disease and hypertension  Home Antihyperglycemic Regiment:  -Farxiga, glipizide, insulin daily at home  - Titrate and addition of insulin as needed for BG goal 140-180  - SSI with POCT accuchecks ACHS and Diabetic diet 2000 beti  - Diabetic nutritional counseling given   - Continue home gabapentin for diabetic peripheral neuropathy  - Appreciate Endocrinology recs  - Adjust insulin and monitor closely  - 8/20 uncontrolled, endocrine following, still hypogylcemic at times      VTE Risk Mitigation (From admission, onward)    None          Discharge Planning   REBECCA: 8/19/2023     Code Status:  Full Code   Is the patient medically ready for discharge?: No    Reason for patient still in hospital (select all that apply): Patient trending condition and Treatment  Discharge Plan A: Home with family, Home Health                  Baudilio Lozada MD  Department of Hospital Medicine   [facility] - Cardiology Stepdown

## 2023-08-20 NOTE — ASSESSMENT & PLAN NOTE
BG goal: 140-180     - Novolog /25  - POCT Glucose before meals and at bedtime  - Hypoglycemia protocol in place      ** Please notify Endocrine for any change and/or advance in diet**  ** Please call Endocrine for any BG related issues **     Discharge Planning:   TBD. Please notify endocrinology prior to discharge.

## 2023-08-20 NOTE — PROGRESS NOTES
"[facility] - Cardiology Stepdown  Endocrinology  Progress Note    Admit Date: 8/14/2023     Reason for Consult: Management of T2DM,       Diabetes diagnosis year: pt not sure - states "long time ago"; at least as far back as 2004 per review of elevated a1c in epic     Home Diabetes Medications:  Glipizide ER 10mg before breakfast. Levemir 2u daily on a sliding scale   How often checking glucose at home?  Usually 2x/day,   BG readings on regimen: Upper 100s- 200s  Hypoglycemia on the regimen? Lows on 12 units of Levemir  Missed doses on regimen?  Diabetes Complications include:     Hyperglycemia, Diabetic chronic kidney disease     , Diabetic peripheral neuropathy , and Diabetic peripheral angiopathy with/without gangrene     Complicating diabetes co morbidities:   History of MI, MURTAZA, and CKD        HPI:   Patient is a 63 y.o. female with CAD with GINGER , HTN, HLD, DM2, CKD, MURTAZA (not on CPAP), PAD, who presented to the ED to the ER after a near syncopal event at home around 4 pm. She also feels tired and her appetite lately has decreased. she had chest pain and visceral symptoms with nausea and vomiting. She reports losing weight as well.She has had abdominal symptoms with poor PO intake leading to orthostasis. Today she had another near syncopal event and is why she was brought to the ER. Here her ECG showed the posterior changes for which posterior EKG was obtained with showed STEMI. Bedside echo showed an EF 45-50% and norma-lateral and infero-lateral hypokinesis. She was taken to cath lab and her symptoms resolved after PCI. Please refer to PCI for full details. Endocrine consulted for bg management.      Interval HPI:   Overnight events: Patient in room 348/348 A. Blood glucose stable. BG at and below goal on current insulin regimen (SSI ). Steroid use- None. 3 Days Post-Op  Renal function- Normal   Vasopressors-  None       Endocrine will continue to follow and manage insulin orders inpatient.         Diet NPO " "Except for: Medication     Eating:   NPO  Nausea: No  Hypoglycemia and intervention: Yes, Patient responded to hypoglycemia protocol. Discontinued basal insulin. Blood glucose improved, without excursions.   Fever: No  TPN and/or TF: No    /67 (BP Location: Left arm, Patient Position: Lying)   Pulse 92   Temp 98.2 °F (36.8 °C) (Oral)   Resp 18   Ht 5' 4" (1.626 m)   Wt 56.1 kg (123 lb 10.9 oz)   LMP  (LMP Unknown)   SpO2 98%   BMI 21.23 kg/m²     Labs Reviewed and Include    Recent Labs   Lab 08/20/23  0600   GLU 77   CALCIUM 7.6*   ALBUMIN 1.5*   PROT 6.0      K 3.5   CO2 25      BUN 9   CREATININE 0.7   ALKPHOS 701*   ALT 66*   *   BILITOT 4.0*     Lab Results   Component Value Date    WBC 9.57 08/20/2023    HGB 7.1 (L) 08/20/2023    HCT 23.2 (L) 08/20/2023    MCV 83 08/20/2023     08/20/2023     No results for input(s): "TSH", "FREET4" in the last 168 hours.  Lab Results   Component Value Date    HGBA1C 8.8 (H) 07/10/2023       Nutritional status:   Body mass index is 21.23 kg/m².  Lab Results   Component Value Date    ALBUMIN 1.5 (L) 08/20/2023    ALBUMIN 1.5 (L) 08/20/2023    ALBUMIN 1.6 (L) 08/19/2023     No results found for: "PREALBUMIN"    Estimated Creatinine Clearance: 72 mL/min (based on SCr of 0.7 mg/dL).    Accu-Checks  Recent Labs     08/18/23  1641 08/18/23  1939 08/19/23  0733 08/19/23  0734 08/19/23  0757 08/19/23  0848 08/19/23  0928 08/19/23  1100 08/19/23  1739 08/19/23  1919   POCTGLUCOSE 121* 93 35* 34* 28* 122* 211* 154* 111* 125*       Current Medications and/or Treatments Impacting Glycemic Control  Immunotherapy:    Immunosuppressants       None          Steroids:   Hormones (From admission, onward)      Start     Stop Route Frequency Ordered    08/15/23 2231  melatonin tablet 9 mg         -- Oral Nightly PRN 08/15/23 2231          Pressors:    Autonomic Drugs (From admission, onward)      Start     Stop Route Frequency Ordered    08/15/23 0900  " metoprolol succinate (TOPROL-XL) 24 hr split tablet 12.5 mg         -- Oral Daily 08/14/23 2130          Hyperglycemia/Diabetes Medications:   Antihyperglycemics (From admission, onward)      Start     Stop Route Frequency Ordered    08/18/23 0830  insulin aspart U-100 pen 1-10 Units         -- SubQ Before meals & nightly PRN 08/18/23 0730            ASSESSMENT and PLAN    Cardiac/Vascular  * STEMI (ST elevation myocardial infarction)    Managed per primary team  Avoid hypoglycemia      CAD (coronary artery disease)    Managed per primary team      Hyperlipidemia associated with type 2 diabetes mellitus  Managed per primary.   On statin per ADA      Endocrine  Type 2 diabetes mellitus with diabetic peripheral angiopathy without gangrene  BG goal: 140-180     - Novolog /25  - POCT Glucose before meals and at bedtime  - Hypoglycemia protocol in place      ** Please notify Endocrine for any change and/or advance in diet**  ** Please call Endocrine for any BG related issues **     Discharge Planning:   TBD. Please notify endocrinology prior to discharge.               Blayne Gray, DNP, FNP  Endocrinology  [facility] - Cardiology Stepdown

## 2023-08-20 NOTE — PLAN OF CARE
Problem: Diabetes Comorbidity  Goal: Blood Glucose Level Within Targeted Range  Outcome: Ongoing, Progressing     Problem: Fluid and Electrolyte Imbalance (Acute Kidney Injury/Impairment)  Goal: Fluid and Electrolyte Balance  Outcome: Ongoing, Progressing     Problem: Oral Intake Inadequate (Acute Kidney Injury/Impairment)  Goal: Optimal Nutrition Intake  Outcome: Ongoing, Progressing     Problem: Renal Function Impairment (Acute Kidney Injury/Impairment)  Goal: Effective Renal Function  Outcome: Ongoing, Progressing     Problem: Adult Inpatient Plan of Care  Goal: Plan of Care Review  Outcome: Ongoing, Progressing  Goal: Patient-Specific Goal (Individualized)  Outcome: Ongoing, Progressing  Goal: Absence of Hospital-Acquired Illness or Injury  Outcome: Ongoing, Progressing  Goal: Optimal Comfort and Wellbeing  Outcome: Ongoing, Progressing  Goal: Readiness for Transition of Care  Outcome: Ongoing, Progressing     Problem: Fall Injury Risk  Goal: Absence of Fall and Fall-Related Injury  Outcome: Ongoing, Progressing     Problem: Skin Injury Risk Increased  Goal: Skin Health and Integrity  Outcome: Ongoing, Progressing

## 2023-08-20 NOTE — ASSESSMENT & PLAN NOTE
-Started having near syncope around 4 pm on the day of admission  -Bedside echo in the ER showed an EF 45-50% and norma-lateral and infero-lateral hypokinesis  -She underwent Successful primary PCI of ramus intermedius and lateral branch with Pronto thrombectomy and 3X16 GINGER     Plan  - got 750 cc in the lab and additional IVF @ 100 cc/hr x 12 hours, LVEDP 9 and appeared volume down (was discharged on lasix 40 daily for ADHF, might have to discharge home this time on 20 daily lasix)  - Bed rest x 4 hours   - Continue aspirin 81 mg daily, stop after 1 month to avoid triple therapy  - Loaded with brilinta 180, loaded with plavix 600 8/15/23, and 75 qd there after  - Hold Eliquis for a fib, using Lovenox  - Continue high intensity statin therapy (LDL goal < 70)  - TTE shows ejection fraction of about 40% associated with grade II diastolic dysfunction.  - Start lasix 20mg IV daily, will reassess and may incrased to 40mg daily based on response     - Upon discharge, patient will continue aspirin and plavix  - Will likely dc on Lovenox due to recurrent hospitalizations that may require acute procedural interventions

## 2023-08-20 NOTE — SUBJECTIVE & OBJECTIVE
"Interval HPI:   Overnight events: Patient in room 348/348 A. Blood glucose stable. BG at and below goal on current insulin regimen (SSI ). Steroid use- None. 3 Days Post-Op  Renal function- Normal   Vasopressors-  None       Endocrine will continue to follow and manage insulin orders inpatient.         Diet NPO Except for: Medication     Eating:   NPO  Nausea: No  Hypoglycemia and intervention: Yes, Patient responded to hypoglycemia protocol. Discontinued basal insulin. Blood glucose improved, without excursions.   Fever: No  TPN and/or TF: No    /67 (BP Location: Left arm, Patient Position: Lying)   Pulse 92   Temp 98.2 °F (36.8 °C) (Oral)   Resp 18   Ht 5' 4" (1.626 m)   Wt 56.1 kg (123 lb 10.9 oz)   LMP  (LMP Unknown)   SpO2 98%   BMI 21.23 kg/m²     Labs Reviewed and Include    Recent Labs   Lab 08/20/23  0600   GLU 77   CALCIUM 7.6*   ALBUMIN 1.5*   PROT 6.0      K 3.5   CO2 25      BUN 9   CREATININE 0.7   ALKPHOS 701*   ALT 66*   *   BILITOT 4.0*     Lab Results   Component Value Date    WBC 9.57 08/20/2023    HGB 7.1 (L) 08/20/2023    HCT 23.2 (L) 08/20/2023    MCV 83 08/20/2023     08/20/2023     No results for input(s): "TSH", "FREET4" in the last 168 hours.  Lab Results   Component Value Date    HGBA1C 8.8 (H) 07/10/2023       Nutritional status:   Body mass index is 21.23 kg/m².  Lab Results   Component Value Date    ALBUMIN 1.5 (L) 08/20/2023    ALBUMIN 1.5 (L) 08/20/2023    ALBUMIN 1.6 (L) 08/19/2023     No results found for: "PREALBUMIN"    Estimated Creatinine Clearance: 72 mL/min (based on SCr of 0.7 mg/dL).    Accu-Checks  Recent Labs     08/18/23  1641 08/18/23  1939 08/19/23  0733 08/19/23  0734 08/19/23  0757 08/19/23  0848 08/19/23  0928 08/19/23  1100 08/19/23  1739 08/19/23  1919   POCTGLUCOSE 121* 93 35* 34* 28* 122* 211* 154* 111* 125*       Current Medications and/or Treatments Impacting Glycemic Control  Immunotherapy:    Immunosuppressants       " None          Steroids:   Hormones (From admission, onward)      Start     Stop Route Frequency Ordered    08/15/23 2231  melatonin tablet 9 mg         -- Oral Nightly PRN 08/15/23 2231          Pressors:    Autonomic Drugs (From admission, onward)      Start     Stop Route Frequency Ordered    08/15/23 0900  metoprolol succinate (TOPROL-XL) 24 hr split tablet 12.5 mg         -- Oral Daily 08/14/23 2130          Hyperglycemia/Diabetes Medications:   Antihyperglycemics (From admission, onward)      Start     Stop Route Frequency Ordered    08/18/23 0830  insulin aspart U-100 pen 1-10 Units         -- SubQ Before meals & nightly PRN 08/18/23 0730

## 2023-08-20 NOTE — CONSULTS
[facility]  Gastroenterology  Consult Note    Patient Name: Mariann Huff  MRN: [id number]  Admission Date: 8/14/2023  Hospital Length of Stay: 6 days  Code Status: Full Code   Attending Provider: Baudilio Lozada MD   Consulting Provider: Kyle Mccormack DO  Primary Care Physician: Jasbir Haney MD  Principal Problem:STEMI (ST elevation myocardial infarction)    Inpatient consult to Gastroenterology  Consult performed by: Kyle Mccormack DO  Consult ordered by: Baudilio Lozada MD        Subjective:     HPI: Mariann Huff is a 63 y.o. female with history of CAD s/p GINGER to ostial LAD in 2014, HTN, HLD, DM2, MURTAZA who presented to the ED after a near syncopal event at home. Found to have a STEMI, taken to the cath lab and is now s/p PCI.     Of note, the patient was recently admitted (6/23) for suspected biliary pancreatitis and biliary stricture treated with biliary sphincterotomy, biliary stent, and cholecystectomy. During this admission, there was a rise in her LFT's and T bili prompting AES evaluation. Patient had ERCP and EUS on 8/17/23. Visualized a partial occluded stent in the biliary tree. One covered metal stent was placed into the common bile duct. Concern for metastatic cancer is high due to multiple liver lesions and mass on pancreatic head per AES. S/p FNA biopsy with ERCP and stent placement on 8/17. Of note, a sphincterotomy was not done on this admission, prior sphincterotomy appeared open during ERCP on 8/17 for stent exchange.     GI is consulted for anemia and concerns for dark bowel movements/melena. Hgb was 8.4 on 8/18- > 6.1 on 8/19. S/p 1 unit of PRBC's. Hgb 7.1 today.     Past Medical History:   Diagnosis Date    Anticoagulant long-term use     Asthma     Back pain     Bradycardia, unspecified 05/08/2019    The etiology of the bradycardic episode is unclear.  The have appear to be respiratory in origin (at least the 1st episode).  We will continue to monitor carefully.  We  are awaiting evaluation by Cardiology.      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, with long-term current use of insulin 2020       Past Surgical History:   Procedure Laterality Date    ANGIOGRAM, CORONARY, WITH LEFT HEART CATHETERIZATION N/A 8/3/2023    Procedure: Angiogram, Coronary, with Left Heart Cath;  Surgeon: Aime George MD;  Location: [facility] CATH LAB;  Service: Cardiology;  Laterality: N/A;    ANGIOGRAM, CORONARY, WITH LEFT HEART CATHETERIZATION N/A 2023    Procedure: Angiogram, Coronary, with Left Heart Cath;  Surgeon: Wilman Kim MD;  Location: [facility] CATH LAB;  Service: Cardiology;  Laterality: N/A;    ANTEGRADE NEPHROSTOGRAPHY Left 2019    Procedure: Nephrostogram - antegrade;  Surgeon: Robin Boyd MD;  Location: [facility] OR [address] [place] [address];  Service: Urology;  Laterality: Left;    BRONCHOSCOPY N/A 2019    Procedure: BRONCHOSCOPY;  Surgeon: Sean Ruano MD;  Location: [facility] OR [address] [place] [address];  Service: General;  Laterality: N/A;    CARDIAC CATHETERIZATION      CATARACT EXTRACTION      cataract extraction left eye      cataracts      CAUDAL EPIDURAL STEROID INJECTION N/A 2019    Procedure: Injection-steroid-epidural-caudal;  Surgeon: Dave Bentley Jr., MD;  Location: [facility] PAIN MGT;  Service: Pain Management;  Laterality: N/A;     SECTION, LOW TRANSVERSE      COLONOSCOPY N/A  5/24/2019    Procedure: COLONOSCOPY;  Surgeon: Al Alaniz MD;  Location: [facility] ENDO (2ND FLR);  Service: Endoscopy;  Laterality: N/A;    CORONARY ANGIOPLASTY      CYSTOGRAM N/A 12/11/2019    Procedure: CYSTOGRAM INTRAOP;  Surgeon: Robin Boyd MD;  Location: [facility] OR 2ND FLR;  Service: Urology;  Laterality: N/A;  1 HOUR    CYSTOSCOPY W/ RETROGRADES Left 12/11/2019    Procedure: CYSTOSCOPY, WITH RETROGRADE PYELOGRAM;  Surgeon: Robin Boyd MD;  Location: [facility] OR 2ND FLR;  Service: Urology;  Laterality: Left;  fluro    ENDOSCOPIC ULTRASOUND OF UPPER GASTROINTESTINAL TRACT N/A 6/15/2023    Procedure: ULTRASOUND, UPPER GI TRACT, ENDOSCOPIC;  Surgeon: Lisa Kim MD;  Location: [facility] ENDO (2ND FLR);  Service: Endoscopy;  Laterality: N/A;    ENDOSCOPIC ULTRASOUND OF UPPER GASTROINTESTINAL TRACT  8/17/2023    Procedure: ULTRASOUND, UPPER GI TRACT, ENDOSCOPIC;  Surgeon: Wayne Adamson MD;  Location: [facility] ENDO (2ND FLR);  Service: Endoscopy;;    ERCP N/A 6/15/2023    Procedure: ERCP (ENDOSCOPIC RETROGRADE CHOLANGIOPANCREATOGRAPHY);  Surgeon: Lisa Kim MD;  Location: [facility] ENDO (2ND FLR);  Service: Endoscopy;  Laterality: N/A;    ERCP N/A 8/17/2023    Procedure: ERCP (ENDOSCOPIC RETROGRADE CHOLANGIOPANCREATOGRAPHY);  Surgeon: Wayne Adamson MD;  Location: [facility] ENDO (2ND FLR);  Service: Endoscopy;  Laterality: N/A;  eliquis and pletal ok to hold-see te 7/26/23-dr [id number] portal    ERCP N/A 8/17/2023    Procedure: ERCP (ENDOSCOPIC RETROGRADE CHOLANGIOPANCREATOGRAPHY);  Surgeon: Wayne Adamson MD;  Location: [facility] ENDO (2ND FLR);  Service: Endoscopy;  Laterality: N/A;    ESOPHAGOGASTRODUODENOSCOPY W/ PEG  5/2/2019    Procedure: EGD, WITH PEG TUBE INSERTION;  Surgeon: Sean Ruano MD;  Location: [facility] OR 2ND FLR;  Service: General;;    EXCISION TURBINATE, SUBMUCOUS      FLEXIBLE SIGMOIDOSCOPY N/A 5/13/2019    Procedure: SIGMOIDOSCOPY, FLEXIBLE;  Surgeon: ALBERTO Amin MD;  Location: [facility]  (2ND FLR);  Service: Endoscopy;  Laterality: N/A;    FLEXIBLE SIGMOIDOSCOPY N/A 5/21/2019    Procedure: SIGMOIDOSCOPY, FLEXIBLE;  Surgeon: ALBERTO Amin MD;  Location: [facility] ENDO (2ND FLR);  Service: Endoscopy;  Laterality: N/A;    FUSION OF LUMBAR SPINE BY ANTERIOR APPROACH Left 4/12/2019    Procedure: FUSION, SPINE, LUMBAR, ANTERIOR APPROACH Left L5-S1 Anterior to Psoa Interbody Fusion, L5-S1 Posterior Instrumentation;  Surgeon: Mk George MD;  [address]  Service: Neurosurgery;  Laterality: Left;  Porcedure:  Left L5-S1 Anterior to Psoa Interbody Fusion, L5-S1 Posterior Instrumentation  Surgery Time: 4 Hrs  LOS: 2-3  Anesthesia: General   Blood: Type & Screen  R    HAND SURGERY Left     HAND SURGERY Right     torn ligament repair/ Dr. Yeboah    HYSTERECTOMY      INJECTION OF STEROID Right 12/10/2020    Procedure: INJECTION, STEROID Right SI Joint Block and Steroid Injection;  Surgeon: Mk George MD;  Location: [facility];  Service: Neurosurgery;  Laterality: Right;  Procedure: Right SI Joint Block and Steroid Injection   SUrgery Time: 30 Min  LOS: 0  Anesthesia: MAC  Radiology: C-arm  Bed: [place] [id number] Poster  Position: Prone    INJECTION OF STEROID Right 9/28/2021    Procedure: INJECTION, STEROID Right SI joint block & steroid injection;  Surgeon: Mk George MD;  Location: [facility];  Service: Neurosurgery;  Laterality: Right;  Procedure: Right SI joint block & steroid injection  Surgery Time: 30m  Anesthesia: Local MAC  Radiology: C-arm  Bed: Regular  Position: Prone    IVUS, CORONARY  8/14/2023    Procedure: IVUS, Coronary;  Surgeon: Wilman Kim MD;  Location: [facility] CATH LAB;  Service: Cardiology;;    LAPAROSCOPIC CHOLECYSTECTOMY N/A 6/23/2023    Procedure: CHOLECYSTECTOMY, LAPAROSCOPIC;  Surgeon: Richard Penny MD;  [address];  Service: General;  Laterality: N/A;    left foot surgery      left wrist surgery      LYSIS OF ADHESIONS N/A 2/19/2020     Procedure: LYSIS, ADHESIONS;  Surgeon: Robin Boyd MD;  Location: [address];  Service: Urology;  Laterality: N/A;    NASAL SEPTUM SURGERY  5/7/15    OPEN REDUCTION AND INTERNAL FIXATION (ORIF) OF FRACTURE OF PROXIMAL HUMERUS Left 7/12/2023    Procedure: ORIF, FRACTURE, HUMERUS, PROXIMAL ;  Surgeon: Tomasz Leary MD;  Location: [address];  Service: Orthopedics;  Laterality: Left;    PERCUTANEOUS NEPHROSTOMY Left 4/21/2019    Procedure: Creation, Nephrostomy, Percutaneous;  Surgeon: Karina Surgeon;  Location: [facility];  Service: Anesthesiology;  Laterality: Left;    REPAIR OF URETER  4/12/2019    Procedure: REPAIR, URETER;  Surgeon: Mk George MD;  Location: [address];  Service: Neurosurgery;;    REPLACEMENT OF NEPHROSTOMY TUBE N/A 7/18/2019    Procedure: REPLACEMENT, NEPHROSTOMY TUBE;  Surgeon: [facility] Diagnostic Provider;  Location: [address];  Service: Anesthesiology;  Laterality: N/A;  188    REPLACEMENT OF NEPHROSTOMY TUBE N/A 7/24/2019    Procedure: REPLACEMENT, NEPHROSTOMY TUBE;  Surgeon: [facility] Diagnostic Provider;  Location: [address];  Service: Anesthesiology;  Laterality: N/A;  188    REPLACEMENT OF NEPHROSTOMY TUBE N/A 10/7/2019    Procedure: REPLACEMENT, NEPHROSTOMY TUBE;  Surgeon: [facility] Diagnostic Provider;  Location: [address];  Service: Anesthesiology;  Laterality: N/A;  189    REPLACEMENT OF NEPHROSTOMY TUBE N/A 11/25/2019    Procedure: REPLACEMENT, NEPHROSTOMY TUBE;  Surgeon: [facility] Diagnostic Provider;  Location: [address];  Service: Anesthesiology;  Laterality: N/A;  Room 188/Bessy    REPLACEMENT OF NEPHROSTOMY TUBE Right 2/19/2020    Procedure: REPLACEMENT, NEPHROSTOMY TUBE removal removal;  Surgeon: Robin Boyd MD;  Location: [address];  Service: Urology;  Laterality: Right;    RIGHT HEART CATHETERIZATION Right 8/3/2023    Procedure: INSERTION, CATHETER, RIGHT HEART;  Surgeon: Aime George MD;  Location: [facility] CATH LAB;  Service:  Cardiology;  Laterality: Right;    rt elbow surgery      S/P LAD COATED STENT  05/14/2010    6 total     S/P OM1 STENT  08/2003    SINUS SURGERY      F.E.S.S.    STENT, DRUG ELUTING, SINGLE VESSEL, CORONARY  8/14/2023    Procedure: Stent, Drug Eluting, Single Vessel, Coronary;  Surgeon: Wilman Kim MD;  Location: [facility] C[facility] LAB;  Service: Cardiology;;    TRACHEOSTOMY N/A 5/2/2019    Procedure: CREATION, TRACHEOSTOMY;  Surgeon: Sean Ruano MD;  Location: [address];  Service: General;  Laterality: N/A;    TUBAL LIGATION      URETERAL REIMPLANTION Left 2/19/2020    Procedure: REIMPLANTATION, URETER WITH PSOAS HITCH;  Surgeon: Robin Boyd MD;  Location: [facility] OR [address] [place] [address];  Service: Urology;  Laterality: Left;    VENTRICULOGRAM, LEFT  8/3/2023    Procedure: Ventriculogram, Left;  Surgeon: Aime George MD;  Location: [facility] CATH LAB;  Service: Cardiology;;       Family History   Problem Relation Age of Onset    Diabetes Mother     Heart disease Mother     Diabetes Father     Leukemia Father         leukemia    Heart attack Father     Diabetes Sister     Diabetes Brother     Diabetes Sister     No Known Problems Sister     No Known Problems Brother     No Known Problems Brother     No Known Problems Maternal Grandmother     No Known Problems Maternal Grandfather     No Known Problems Paternal Grandmother     No Known Problems Paternal Grandfather     No Known Problems Son     No Known Problems Son     No Known Problems Maternal Aunt     No Known Problems Maternal Uncle     No Known Problems Paternal Aunt     No Known Problems Paternal Uncle     Colon cancer Neg Hx     Inflammatory bowel disease Neg Hx     Melanoma Neg Hx     Psoriasis Neg Hx     Lupus Neg Hx     Eczema Neg Hx     Acne Neg Hx     Amblyopia Neg Hx     Blindness Neg Hx     Cancer Neg Hx     Cataracts Neg Hx     Glaucoma Neg Hx     Hypertension Neg Hx     Macular degeneration Neg Hx     Retinal detachment Neg Hx     Strabismus Neg Hx      Stroke Neg Hx     Thyroid disease Neg Hx     Heart failure Neg Hx     Hyperlipidemia Neg Hx        Social History     Socioeconomic History    Marital status:      Spouse name: Shamir    Number of children: 2   Occupational History    Occupation: [employer]     Employer: [employer]     Employer: [employer]     Employer: [place] [employer]   Tobacco Use    Smoking status: Never    Smokeless tobacco: Never   Substance and Sexual Activity    Alcohol use: No     Alcohol/week: 0.0 standard drinks of alcohol    Drug use: No    Sexual activity: Yes     Partners: Male     Birth control/protection: Post-menopausal     Comment:    Other Topics Concern    Are you pregnant or think you may be? No    Breast-feeding No   Social History Narrative    . 2 children.      Social Determinants of Health     Financial Resource Strain: Low Risk  (6/10/2023)    Overall Financial Resource Strain (CARDIA)     Difficulty of Paying Living Expenses: Not hard at all   Food Insecurity: No Food Insecurity (6/10/2023)    Hunger Vital Sign     Worried About Running Out of Food in the Last Year: Never true     Ran Out of Food in the Last Year: Never true   Transportation Needs: No Transportation Needs (6/10/2023)    PRAPARE - Transportation     Lack of Transportation (Medical): No     Lack of Transportation (Non-Medical): No   Physical Activity: Sufficiently Active (6/10/2023)    Exercise Vital Sign     Days of Exercise per Week: 4 days     Minutes of Exercise per Session: 60 min   Stress: Stress Concern Present (6/10/2023)    [place] [employer] - Occupational Stress Questionnaire     Feeling of Stress : To some extent   Social Connections: Moderately Isolated (6/10/2023)    Social Connection and Isolation Panel [NHANES]     Frequency of Communication with Friends and Family: Once a week     Frequency of Social Gatherings with Friends and Family: Once a week     Attends  Oriental orthodox Services: More than 4 times per year     Active Member of Clubs or Organizations: No     Attends Club or Organization Meetings: Never     Marital Status:    Housing Stability: Low Risk  (6/10/2023)    Housing Stability Vital Sign     Unable to Pay for Housing in the Last Year: No     Number of Places Lived in the Last Year: 1     Unstable Housing in the Last Year: No       Current Facility-Administered Medications on File Prior to Encounter   Medication Dose Route Frequency Provider Last Rate Last Admin    0.9%  NaCl infusion   Intravenous Continuous Aime George  mL/hr at 10/27/22 0842 New Bag at 10/27/22 0842    fentaNYL 50 mcg/mL injection  mcg   mcg Intravenous PRN Mook Chavez, DO        midazolam (VERSED) 1 mg/mL injection 0.5-4 mg  0.5-4 mg Intravenous PRN Mook Chavez, DO         Current Outpatient Medications on File Prior to Encounter   Medication Sig Dispense Refill    ACCU-CHEK EDIN PLUS METER Misc       acetaminophen (TYLENOL) 650 MG TbSR Take 1 tablet (650 mg total) by mouth every 8 (eight) hours. (Patient not taking: Reported on 8/10/2023) 50 tablet 0    albuterol (PROVENTIL/VENTOLIN HFA) 90 mcg/actuation inhaler Inhale 2 puffs into the lungs every 6 (six) hours as needed for Wheezing. (Patient not taking: Reported on 8/10/2023) 1 g 3    apixaban (ELIQUIS) 5 mg Tab Take 1 tablet (5 mg total) by mouth 2 (two) times daily. 60 tablet 1    atorvastatin (LIPITOR) 40 MG tablet Take 1 tablet (40 mg total) by mouth every evening. 90 tablet 3    blood sugar diagnostic (ACCU-CHEK EDIN PLUS TEST STRP) Strp TEST BLOOD SUGARS 4 TIMES DAILY 150 strip 11    cefpodoxime (VANTIN) 100 MG tablet Take 4 tablets (400 mg total) by mouth every 12 (twelve) hours. for 21 days 168 tablet 0    dapagliflozin (FARXIGA) 10 mg tablet Take 1 tablet (10 mg total) by mouth once daily. (Patient not taking: Reported on 7/10/2023) 90 tablet 3    diclofenac sodium (VOLTAREN) 1 % Gel Apply 2 g  "topically 3 (three) times daily. Apply to the area of pain 2-3x per day or night as needed (Patient not taking: Reported on 7/10/2023) 100 g 3    EScitalopram oxalate (LEXAPRO) 10 MG tablet Take 1 tablet (10 mg total) by mouth once daily. 90 tablet 2    furosemide (LASIX) 40 MG tablet Take 1 tablet (40 mg total) by mouth once daily. 30 tablet 11    gabapentin (NEURONTIN) 300 MG capsule Take 1 capsule (300 mg) in the morning and 2 capsules (600 mg) in the evening 90 capsule 0    glipiZIDE (GLUCOTROL) 10 MG TR24 Take 1 tablet (10 mg total) by mouth daily with breakfast. (Patient not taking: Reported on 8/10/2023) 90 tablet 3    insulin detemir U-100, Levemir, (LEVEMIR FLEXPEN) 100 unit/mL (3 mL) InPn pen Inject 14 Units into the skin every evening. (Patient taking differently: Inject into the skin every evening. Adjusted dose based on glucose) 12 mL 3    losartan (COZAAR) 25 MG tablet Take 1 tablet (25 mg total) by mouth once daily. 90 tablet 3    magnesium oxide (MAG-OX) 400 mg (241.3 mg magnesium) tablet Take 1 tablet (400 mg total) by mouth 2 (two) times daily. 120 tablet 0    metoprolol succinate (TOPROL-XL) 25 MG 24 hr tablet Take 0.5 tablets (12.5 mg total) by mouth once daily. 15 tablet 11    multivitamin (THERAGRAN) per tablet Take 1 tablet by mouth once daily. (Patient not taking: Reported on 8/10/2023)      oxyCODONE (ROXICODONE) 5 MG immediate release tablet Take 1 tablet (5 mg total) by mouth every 4 (four) hours as needed for Pain. (Patient not taking: Reported on 8/10/2023) 42 tablet 0    pen needle, diabetic (NOVOTWIST) 32 gauge x 1/5" Ndle Use 1 needle to inject insulin four times daily 400 each 2    pen needle, diabetic 32 gauge x 5/32" Ndle Use as directed 100 each 0    spironolactone (ALDACTONE) 25 MG tablet Take 0.5 tablets (12.5 mg total) by mouth once daily. 15 tablet 11    ticagrelor (BRILINTA) 90 mg tablet Take 1 tablet (90 mg total) by mouth 2 (two) times daily. 60 tablet 11       Review of " patient's allergies indicates:   Allergen Reactions    Milk containing products (dairy)      Causes GI distress     Review of Systems   Constitutional:  Negative for chills and fever.   Respiratory:  Positive for shortness of breath. Negative for cough.    Cardiovascular:  Negative for chest pain.   Gastrointestinal:  Negative for abdominal pain, blood in stool, diarrhea, melena, nausea and vomiting.      Objective:     Vitals:    08/20/23 0816   BP: 137/68   Pulse: 91   Resp: 18   Temp: 96.5 °F (35.8 °C)     Constitutional:  not in acute distress and well developed  HENT: Head: Normal, normocephalic, atraumatic.  Eyes: conjunctiva clear  Cardiovascular: regular rate and rhythm  Respiratory: normal chest expansion & respiratory effort   and no accessory muscle use, on 3L's NC   GI: soft, non-tender, without masses or organomegaly  Musculoskeletal: no muscular tenderness noted  Skin: normal color  Neurological: alert, oriented x3  Psychiatric: mood and affect are within normal limits  Rectal: Dark brown stool on bed, no evidence of melena or hematochezia     Significant Labs:  Recent Labs   Lab 08/19/23  0533 08/19/23  1540 08/20/23  0444   HGB 6.4* 6.1* 7.1*       Lab Results   Component Value Date    WBC 9.57 08/20/2023    HGB 7.1 (L) 08/20/2023    HCT 23.2 (L) 08/20/2023    MCV 83 08/20/2023     08/20/2023       Lab Results   Component Value Date     08/20/2023    K 3.5 08/20/2023     08/20/2023    CO2 25 08/20/2023    BUN 9 08/20/2023    CREATININE 0.7 08/20/2023    CALCIUM 7.6 (L) 08/20/2023    ANIONGAP 10 08/20/2023    ESTGFRAFRICA >60.0 06/14/2022    EGFRNONAA 54.3 (A) 06/14/2022       Lab Results   Component Value Date    ALT 66 (H) 08/20/2023     (H) 08/20/2023    ALKPHOS 701 (H) 08/20/2023    BILITOT 4.0 (H) 08/20/2023       Lab Results   Component Value Date    INR 1.1 08/20/2023    INR 1.2 08/19/2023    INR 1.2 08/18/2023       Significant Imaging:  Reviewed pertinent radiology  findings.       Assessment/Plan:     Mariann Huff is a 63 y.o. female with history of CAD s/p GINGER to ostial LAD in 2014, HTN, HLD, DM2, MURTAZA who presented to the ED after a near syncopal event at home. Found to have a STEMI, taken to the cath lab and is now s/p PCI. GI is consulted for anemia and concerns for dark bowel movements/melena. Hgb was 8.4 on 8/18- > 6.1 on 8/19. S/p 1 unit of PRBC's. Hgb 7.1 today.     Problem List:  Anemia   STEMI  Biliary Obstruction     Recommendations:  - Rectal exam with brown stool, no overt signs of GIB at this time, would consider alternative etiologies/causes to anemia   - Would continue to monitor Hgb and change in stool quality   - Trend Hgb and transfuse for Hgb > 7, unless otherwise indicated  - Maintain IV access with 2 large bore IV's  - Intravascular resuscitation/support with IVFs   - Hold all NSAIDs and anticoagulants, unless contraindicated  - Can continue PPI   - Please correct any coagulopathy with platelets and FFP for goal of platelets >50K and INR <2.0  - Please notify GI team if there is significant change in patient's clinical status  - We will follow up with the patient to determine if there is any need for endoscopic evaluation      Thank you for involving us in the care of Mariann Huff. Please call with any additional questions, concerns or changes in the patient's clinical status. We will continue to follow.     Kyle Mccormack DO  Gastroenterology Fellow PGY- IV    [facility]

## 2023-08-20 NOTE — ASSESSMENT & PLAN NOTE
Home Antihyperglycemic Regiment:  -Farxiga, glipizide, insulin daily at home  - Titrate and addition of insulin as needed for BG goal 140-180  - SSI with POCT accuchecks ACHS and Diabetic diet 2000 beti  - Diabetic nutritional counseling given   - Continue home gabapentin for diabetic peripheral neuropathy  - Appreciate Endocrinology recs  - Adjust insulin and monitor closely  - 8/20 uncontrolled, endocrine following, still hypogylcemic at times

## 2023-08-20 NOTE — ASSESSMENT & PLAN NOTE
The patient's presentation is concerning for an upper GI bleed. Hgb drop from 8 to 6.1, improved to 7.1 after 1 unit, was unable to tolerate further transfusions due to increasing oxygen requirement/ The patient does not have a history of cirrhosis.    Plan  - 2 large bore IVs  - Type and screen  - Hgb goal >8, but will defer further transfusion for now due to volume overload  - Correct coagulopathy (goal plt >50, INR <1.5) if present in patients without absolute contraindications.  - Hold lovenox but continue DAPT due to recent STEMI  - CBC Q8H  - INR  -   - s/p 1 unit PRBC  - GI consulted  -  S/p 80mg IV pantoprazole  - Pantoprazole 40mg IV BID

## 2023-08-20 NOTE — SUBJECTIVE & OBJECTIVE
Interval History: Became hypoglycemic on 8/19 and Hgb dropped from 8 to 6.3 without bleeding, recheck pending, insulin held, started on D10 gtt.    Review of Systems   Constitutional:  Positive for fatigue.   Respiratory:  Negative for shortness of breath.    Cardiovascular:  Negative for chest pain.   Gastrointestinal:  Positive for abdominal pain.   Genitourinary:  Negative for dysuria and frequency.   Neurological:  Negative for dizziness and headaches.     Objective:     Vital Signs (Most Recent):  Temp: 97 °F (36.1 °C) (08/20/23 1227)  Pulse: 87 (08/20/23 1227)  Resp: 18 (08/20/23 1227)  BP: 136/62 (08/20/23 1227)  SpO2: 98 % (08/20/23 1227) Vital Signs (24h Range):  Temp:  [96.5 °F (35.8 °C)-98.5 °F (36.9 °C)] 97 °F (36.1 °C)  Pulse:  [68-96] 87  Resp:  [18-26] 18  SpO2:  [85 %-99 %] 98 %  BP: ()/(56-79) 136/62     Weight: 56.1 kg (123 lb 10.9 oz)  Body mass index is 21.23 kg/m².    Intake/Output Summary (Last 24 hours) at 8/20/2023 1359  Last data filed at 8/19/2023 2215  Gross per 24 hour   Intake 220.83 ml   Output 500 ml   Net -279.17 ml           Physical Exam  Constitutional:       Appearance: She is ill-appearing.   HENT:      Head: Normocephalic and atraumatic.   Cardiovascular:      Rate and Rhythm: Normal rate and regular rhythm.      Pulses: Normal pulses.      Heart sounds: Normal heart sounds.   Pulmonary:      Effort: Pulmonary effort is normal.      Comments: Crackles in all lung fields  Abdominal:      General: Abdomen is flat.      Tenderness: There is abdominal tenderness.   Musculoskeletal:      Right lower leg: No edema.      Left lower leg: No edema.   Skin:     General: Skin is warm.      Capillary Refill: Capillary refill takes less than 2 seconds.      Coloration: Skin is not jaundiced or pale.   Neurological:      General: No focal deficit present.      Mental Status: She is alert and oriented to person, place, and time.   Psychiatric:         Mood and Affect: Mood normal.         "     Significant Labs: All pertinent labs within the past 24 hours have been reviewed.  BMP:   Recent Labs   Lab 08/19/23  0533 08/20/23  0444 08/20/23  0600   GLU 34*   < > 77      < > 144   K 3.1*   < > 3.5      < > 109   CO2 24   < > 25   BUN 10   < > 9   CREATININE 0.7   < > 0.7   CALCIUM 7.9*   < > 7.6*   MG 2.5  --   --     < > = values in this interval not displayed.       CBC:   Recent Labs   Lab 08/19/23  0533 08/19/23  1540 08/20/23  0444   WBC 10.63 10.43 9.57   HGB 6.4* 6.1* 7.1*   HCT 22.6* 20.3* 23.2*    262 266       CMP:   Recent Labs   Lab 08/19/23  0533 08/20/23  0444 08/20/23  0600    145 144   K 3.1* 3.4* 3.5    109 109   CO2 24 24 25   GLU 34* 80 77   BUN 10 10 9   CREATININE 0.7 0.7 0.7   CALCIUM 7.9* 7.6* 7.6*   PROT 6.2 6.2 6.0   ALBUMIN 1.6* 1.5* 1.5*   BILITOT 3.4* 4.1* 4.0*   ALKPHOS 752* 726* 701*   * 153* 152*   ALT 56* 67* 66*   ANIONGAP 11 12 10       Lactic Acid: No results for input(s): "LACTATE" in the last 48 hours.  Troponin: No results for input(s): "TROPONINI", "TROPONINIHS" in the last 48 hours.  Urine Culture: No results for input(s): "LABURIN" in the last 48 hours.  Urine Studies: No results for input(s): "COLORU", "APPEARANCEUA", "PHUR", "SPECGRAV", "PROTEINUA", "GLUCUA", "KETONESU", "BILIRUBINUA", "OCCULTUA", "NITRITE", "UROBILINOGEN", "LEUKOCYTESUR", "RBCUA", "WBCUA", "BACTERIA", "SQUAMEPITHEL", "HYALINECASTS" in the last 48 hours.    Invalid input(s): "WRIGHTSUR"    Significant Imaging: I have reviewed all pertinent imaging results/findings within the past 24 hours.  "

## 2023-08-20 NOTE — ASSESSMENT & PLAN NOTE
-Last week had CXR with B/L edema, , EF decreased to 40-45%  -Got IV lasix while inpatient and was discharged on 40 po daily however became dehydrated and weak and LVEDP -in the lab showed LVEDP 9, reduce dose to 20 po  - Continue losartan, jarad, and metoprolol, resume SGLT at dc  - Lasix 40mg IV daily for volume overload from transfusion

## 2023-08-21 LAB
ALBUMIN SERPL BCP-MCNC: 1.5 G/DL (ref 3.5–5.2)
ALP SERPL-CCNC: 670 U/L (ref 55–135)
ALT SERPL W/O P-5'-P-CCNC: 80 U/L (ref 10–44)
ANION GAP SERPL CALC-SCNC: 9 MMOL/L (ref 8–16)
ANISOCYTOSIS BLD QL SMEAR: SLIGHT
APTT PPP: 30.1 SEC (ref 21–32)
AST SERPL-CCNC: 170 U/L (ref 10–40)
BASOPHILS # BLD AUTO: 0.02 K/UL (ref 0–0.2)
BASOPHILS # BLD AUTO: 0.02 K/UL (ref 0–0.2)
BASOPHILS # BLD AUTO: 0.03 K/UL (ref 0–0.2)
BASOPHILS NFR BLD: 0.2 % (ref 0–1.9)
BILIRUB DIRECT SERPL-MCNC: 4 MG/DL (ref 0.1–0.3)
BILIRUB SERPL-MCNC: 5 MG/DL (ref 0.1–1)
BLD PROD TYP BPU: NORMAL
BLD PROD TYP BPU: NORMAL
BLOOD UNIT EXPIRATION DATE: NORMAL
BLOOD UNIT EXPIRATION DATE: NORMAL
BLOOD UNIT TYPE CODE: 5100
BLOOD UNIT TYPE CODE: 5100
BLOOD UNIT TYPE: NORMAL
BLOOD UNIT TYPE: NORMAL
BUN SERPL-MCNC: 9 MG/DL (ref 8–23)
CALCIUM SERPL-MCNC: 8 MG/DL (ref 8.7–10.5)
CHLORIDE SERPL-SCNC: 105 MMOL/L (ref 95–110)
CO2 SERPL-SCNC: 26 MMOL/L (ref 23–29)
CODING SYSTEM: NORMAL
CODING SYSTEM: NORMAL
CREAT SERPL-MCNC: 0.8 MG/DL (ref 0.5–1.4)
CROSSMATCH INTERPRETATION: NORMAL
CROSSMATCH INTERPRETATION: NORMAL
DIFFERENTIAL METHOD: ABNORMAL
DISPENSE STATUS: NORMAL
DISPENSE STATUS: NORMAL
EOSINOPHIL # BLD AUTO: 0.2 K/UL (ref 0–0.5)
EOSINOPHIL # BLD AUTO: 0.3 K/UL (ref 0–0.5)
EOSINOPHIL # BLD AUTO: 0.3 K/UL (ref 0–0.5)
EOSINOPHIL NFR BLD: 2 % (ref 0–8)
EOSINOPHIL NFR BLD: 2.2 % (ref 0–8)
EOSINOPHIL NFR BLD: 2.6 % (ref 0–8)
ERYTHROCYTE [DISTWIDTH] IN BLOOD BY AUTOMATED COUNT: 16.2 % (ref 11.5–14.5)
ERYTHROCYTE [DISTWIDTH] IN BLOOD BY AUTOMATED COUNT: 16.4 % (ref 11.5–14.5)
ERYTHROCYTE [DISTWIDTH] IN BLOOD BY AUTOMATED COUNT: 16.7 % (ref 11.5–14.5)
EST. GFR  (NO RACE VARIABLE): >60 ML/MIN/1.73 M^2
GLUCOSE SERPL-MCNC: 104 MG/DL (ref 70–110)
HCT VFR BLD AUTO: 23.7 % (ref 37–48.5)
HCT VFR BLD AUTO: 24.3 % (ref 37–48.5)
HCT VFR BLD AUTO: 27.7 % (ref 37–48.5)
HGB BLD-MCNC: 7.5 G/DL (ref 12–16)
HGB BLD-MCNC: 7.6 G/DL (ref 12–16)
HGB BLD-MCNC: 8.8 G/DL (ref 12–16)
IMM GRANULOCYTES # BLD AUTO: 0.05 K/UL (ref 0–0.04)
IMM GRANULOCYTES # BLD AUTO: 0.07 K/UL (ref 0–0.04)
IMM GRANULOCYTES # BLD AUTO: 0.13 K/UL (ref 0–0.04)
IMM GRANULOCYTES NFR BLD AUTO: 0.4 % (ref 0–0.5)
IMM GRANULOCYTES NFR BLD AUTO: 0.7 % (ref 0–0.5)
IMM GRANULOCYTES NFR BLD AUTO: 1 % (ref 0–0.5)
INR PPP: 1.1 (ref 0.8–1.2)
LYMPHOCYTES # BLD AUTO: 1.1 K/UL (ref 1–4.8)
LYMPHOCYTES # BLD AUTO: 1.2 K/UL (ref 1–4.8)
LYMPHOCYTES # BLD AUTO: 2.2 K/UL (ref 1–4.8)
LYMPHOCYTES NFR BLD: 11.1 % (ref 18–48)
LYMPHOCYTES NFR BLD: 16.6 % (ref 18–48)
LYMPHOCYTES NFR BLD: 9.8 % (ref 18–48)
MAGNESIUM SERPL-MCNC: 2.2 MG/DL (ref 1.6–2.6)
MCH RBC QN AUTO: 25.3 PG (ref 27–31)
MCH RBC QN AUTO: 25.6 PG (ref 27–31)
MCH RBC QN AUTO: 26.5 PG (ref 27–31)
MCHC RBC AUTO-ENTMCNC: 31.3 G/DL (ref 32–36)
MCHC RBC AUTO-ENTMCNC: 31.6 G/DL (ref 32–36)
MCHC RBC AUTO-ENTMCNC: 31.8 G/DL (ref 32–36)
MCV RBC AUTO: 80 FL (ref 82–98)
MCV RBC AUTO: 82 FL (ref 82–98)
MCV RBC AUTO: 83 FL (ref 82–98)
MONOCYTES # BLD AUTO: 0.9 K/UL (ref 0.3–1)
MONOCYTES # BLD AUTO: 1 K/UL (ref 0.3–1)
MONOCYTES # BLD AUTO: 1.4 K/UL (ref 0.3–1)
MONOCYTES NFR BLD: 10.1 % (ref 4–15)
MONOCYTES NFR BLD: 8.2 % (ref 4–15)
MONOCYTES NFR BLD: 8.8 % (ref 4–15)
NEUTROPHILS # BLD AUTO: 8.3 K/UL (ref 1.8–7.7)
NEUTROPHILS # BLD AUTO: 8.9 K/UL (ref 1.8–7.7)
NEUTROPHILS # BLD AUTO: 9.4 K/UL (ref 1.8–7.7)
NEUTROPHILS NFR BLD: 69.9 % (ref 38–73)
NEUTROPHILS NFR BLD: 77.2 % (ref 38–73)
NEUTROPHILS NFR BLD: 78.8 % (ref 38–73)
NRBC BLD-RTO: 0 /100 WBC
NUM UNITS TRANS PACKED RBC: NORMAL
NUM UNITS TRANS PACKED RBC: NORMAL
OVALOCYTES BLD QL SMEAR: ABNORMAL
PLATELET # BLD AUTO: 262 K/UL (ref 150–450)
PLATELET # BLD AUTO: 267 K/UL (ref 150–450)
PLATELET # BLD AUTO: 272 K/UL (ref 150–450)
PLATELET BLD QL SMEAR: ABNORMAL
PMV BLD AUTO: 12 FL (ref 9.2–12.9)
PMV BLD AUTO: 12.2 FL (ref 9.2–12.9)
PMV BLD AUTO: 12.6 FL (ref 9.2–12.9)
POCT GLUCOSE: 104 MG/DL (ref 70–110)
POCT GLUCOSE: 198 MG/DL (ref 70–110)
POCT GLUCOSE: 205 MG/DL (ref 70–110)
POCT GLUCOSE: 222 MG/DL (ref 70–110)
POIKILOCYTOSIS BLD QL SMEAR: SLIGHT
POLYCHROMASIA BLD QL SMEAR: ABNORMAL
POTASSIUM SERPL-SCNC: 3.4 MMOL/L (ref 3.5–5.1)
PROT SERPL-MCNC: 6.3 G/DL (ref 6–8.4)
PROTHROMBIN TIME: 12.2 SEC (ref 9–12.5)
RBC # BLD AUTO: 2.96 M/UL (ref 4–5.4)
RBC # BLD AUTO: 2.97 M/UL (ref 4–5.4)
RBC # BLD AUTO: 3.32 M/UL (ref 4–5.4)
SODIUM SERPL-SCNC: 140 MMOL/L (ref 136–145)
WBC # BLD AUTO: 10.74 K/UL (ref 3.9–12.7)
WBC # BLD AUTO: 11.25 K/UL (ref 3.9–12.7)
WBC # BLD AUTO: 13.4 K/UL (ref 3.9–12.7)

## 2023-08-21 PROCEDURE — 63600175 PHARM REV CODE 636 W HCPCS: Performed by: STUDENT IN AN ORGANIZED HEALTH CARE EDUCATION/TRAINING PROGRAM

## 2023-08-21 PROCEDURE — 97116 GAIT TRAINING THERAPY: CPT

## 2023-08-21 PROCEDURE — 25000003 PHARM REV CODE 250: Performed by: STUDENT IN AN ORGANIZED HEALTH CARE EDUCATION/TRAINING PROGRAM

## 2023-08-21 PROCEDURE — 99233 PR SUBSEQUENT HOSPITAL CARE,LEVL III: ICD-10-PCS | Mod: ,,,

## 2023-08-21 PROCEDURE — 99233 SBSQ HOSP IP/OBS HIGH 50: CPT | Mod: ,,,

## 2023-08-21 PROCEDURE — P9016 RBC LEUKOCYTES REDUCED: HCPCS | Performed by: STUDENT IN AN ORGANIZED HEALTH CARE EDUCATION/TRAINING PROGRAM

## 2023-08-21 PROCEDURE — 97535 SELF CARE MNGMENT TRAINING: CPT

## 2023-08-21 PROCEDURE — 99233 SBSQ HOSP IP/OBS HIGH 50: CPT | Mod: ,,, | Performed by: STUDENT IN AN ORGANIZED HEALTH CARE EDUCATION/TRAINING PROGRAM

## 2023-08-21 PROCEDURE — 99900035 HC TECH TIME PER 15 MIN (STAT)

## 2023-08-21 PROCEDURE — 85730 THROMBOPLASTIN TIME PARTIAL: CPT | Performed by: INTERNAL MEDICINE

## 2023-08-21 PROCEDURE — 99233 PR SUBSEQUENT HOSPITAL CARE,LEVL III: ICD-10-PCS | Mod: ,,, | Performed by: STUDENT IN AN ORGANIZED HEALTH CARE EDUCATION/TRAINING PROGRAM

## 2023-08-21 PROCEDURE — 36415 COLL VENOUS BLD VENIPUNCTURE: CPT | Performed by: STUDENT IN AN ORGANIZED HEALTH CARE EDUCATION/TRAINING PROGRAM

## 2023-08-21 PROCEDURE — 80053 COMPREHEN METABOLIC PANEL: CPT

## 2023-08-21 PROCEDURE — 85025 COMPLETE CBC W/AUTO DIFF WBC: CPT | Mod: 91 | Performed by: STUDENT IN AN ORGANIZED HEALTH CARE EDUCATION/TRAINING PROGRAM

## 2023-08-21 PROCEDURE — C9113 INJ PANTOPRAZOLE SODIUM, VIA: HCPCS | Performed by: STUDENT IN AN ORGANIZED HEALTH CARE EDUCATION/TRAINING PROGRAM

## 2023-08-21 PROCEDURE — 83735 ASSAY OF MAGNESIUM: CPT | Performed by: INTERNAL MEDICINE

## 2023-08-21 PROCEDURE — 85610 PROTHROMBIN TIME: CPT | Performed by: INTERNAL MEDICINE

## 2023-08-21 PROCEDURE — 94761 N-INVAS EAR/PLS OXIMETRY MLT: CPT

## 2023-08-21 PROCEDURE — 82248 BILIRUBIN DIRECT: CPT | Performed by: STUDENT IN AN ORGANIZED HEALTH CARE EDUCATION/TRAINING PROGRAM

## 2023-08-21 PROCEDURE — 25000003 PHARM REV CODE 250: Performed by: INTERNAL MEDICINE

## 2023-08-21 PROCEDURE — 20600001 HC STEP DOWN PRIVATE ROOM

## 2023-08-21 RX ORDER — FUROSEMIDE 10 MG/ML
40 INJECTION INTRAMUSCULAR; INTRAVENOUS ONCE
Status: COMPLETED | OUTPATIENT
Start: 2023-08-21 | End: 2023-08-21

## 2023-08-21 RX ORDER — POTASSIUM CHLORIDE 750 MG/1
30 CAPSULE, EXTENDED RELEASE ORAL EVERY 4 HOURS
Status: COMPLETED | OUTPATIENT
Start: 2023-08-21 | End: 2023-08-21

## 2023-08-21 RX ORDER — HYDROCODONE BITARTRATE AND ACETAMINOPHEN 500; 5 MG/1; MG/1
TABLET ORAL
Status: DISCONTINUED | OUTPATIENT
Start: 2023-08-21 | End: 2023-09-01 | Stop reason: HOSPADM

## 2023-08-21 RX ADMIN — GABAPENTIN 600 MG: 300 CAPSULE ORAL at 09:08

## 2023-08-21 RX ADMIN — ATORVASTATIN CALCIUM 40 MG: 40 TABLET, FILM COATED ORAL at 09:08

## 2023-08-21 RX ADMIN — PANTOPRAZOLE SODIUM 40 MG: 40 INJECTION, POWDER, FOR SOLUTION INTRAVENOUS at 09:08

## 2023-08-21 RX ADMIN — POTASSIUM CHLORIDE 30 MEQ: 10 CAPSULE, COATED, EXTENDED RELEASE ORAL at 09:08

## 2023-08-21 RX ADMIN — INSULIN ASPART 4 UNITS: 100 INJECTION, SOLUTION INTRAVENOUS; SUBCUTANEOUS at 06:08

## 2023-08-21 RX ADMIN — FUROSEMIDE 40 MG: 10 INJECTION, SOLUTION INTRAVENOUS at 11:08

## 2023-08-21 RX ADMIN — ASPIRIN 81 MG: 81 TABLET, COATED ORAL at 09:08

## 2023-08-21 RX ADMIN — GABAPENTIN 300 MG: 300 CAPSULE ORAL at 09:08

## 2023-08-21 RX ADMIN — METOPROLOL SUCCINATE 12.5 MG: 25 TABLET, EXTENDED RELEASE ORAL at 09:08

## 2023-08-21 RX ADMIN — LOSARTAN POTASSIUM 25 MG: 25 TABLET, FILM COATED ORAL at 09:08

## 2023-08-21 RX ADMIN — INSULIN ASPART 1 UNITS: 100 INJECTION, SOLUTION INTRAVENOUS; SUBCUTANEOUS at 09:08

## 2023-08-21 RX ADMIN — Medication 400 MG: at 09:08

## 2023-08-21 RX ADMIN — SPIRONOLACTONE 12.5 MG: 25 TABLET ORAL at 09:08

## 2023-08-21 RX ADMIN — POTASSIUM CHLORIDE 30 MEQ: 10 CAPSULE, COATED, EXTENDED RELEASE ORAL at 02:08

## 2023-08-21 RX ADMIN — CLOPIDOGREL BISULFATE 75 MG: 75 TABLET ORAL at 09:08

## 2023-08-21 RX ADMIN — Medication 9 MG: at 09:08

## 2023-08-21 RX ADMIN — ESCITALOPRAM OXALATE 10 MG: 10 TABLET ORAL at 09:08

## 2023-08-21 NOTE — PROGRESS NOTES
[facility] Medicine  Progress Note    Patient Name: Mariann Huff  MRN: [id number]  Patient Class: IP- Inpatient   Admission Date: 8/14/2023  Length of Stay: 7 days  Attending Physician: Baudilio Lozada MD  Primary Care Provider: Jasbir Haney MD        Subjective:     Principal Problem:Biliary obstruction        HPI:  63 y.o. female with CAD s/p GINGER to ostial LAD in 2014, HTN, HLD, DM2, MURTAZA who presented to the ED to the ER after a near syncopal event at home around 4 pm. She also feels tired and her appetite lately has decreased. she had chest pain and visceral symptoms with nausea and vomiting. She reports losing weight as well.She has had abdominal symptoms with poor PO intake leading to orthostasis. Today she had another near syncopal event and is why she was brought to the ER. Here her ECG showed the posterior changes for which posterior EKG was obtained with showed STEMI. Bedside echo showed an EF 45-50% and norma-lateral and infero-lateral hypokinesis. She was taken to cath lab and her symptoms resolved after PCI. Please refer to PCI for full details.       Overview/Hospital Course:  Patient was taken to cath lab and her symptoms resolved after PCI. Patient chest pain resolved.     Patient reported development of acute abdominal pain associated with nausea and vomiting. Patient was recently admitted (6/23) for suspected biliary pancreatitis and biliary stricture treated with biliary sphincterotomy, biliary stent, and cholecystectomy.    Patient now has worsening transaminitis w/ elevated T-Bili.   Concern for stent occlusion. Patient amylase 107 wnl, and Lipase 61. Workup will include total abdominal ultrasound and [facility] was consulted . Was scheduled for outpatient w/u of hepatic lesions found on previous admission 8/2/23.  Patient had ERCP and EUS on 8/17/23. Visualized a partial occluded stent in the biliary tree. One covered metal stent was placed  into the common bile duct. Concern for metastatic cancer is high due to multiple liver lesions and mass on pancreatic head per [facility]. Initially on previous hospital admission, it was believed to more likely be liver abscesses rather than malignancy due to acute nature of progression. S/p FNA biopsy with ERCP and stent placement on 8/17.     Became hypoglycemic on 8/19 and Hgb dropped from 8 to 6.3 with a reported dark stool, Improved to 7.1 after 1 unit PRBC, could not get a second unit due to respiratory distress, no further episodes of melena. Seen by GI, did not recommend endoscopy at this time for possible GIB.  Given lasix 20mg IV then 40mg IV with good output and improvement in respiratory status.    T bili continued to rise on 8/21, KUB did not show stent migration, possible ERCP 8/22. Hgb stable at 7.6, will aim for >8, given lasix 40mg IV with 1 unit PRBC.      Interval History: NAEON, t bili rising, possible ERCP 8/22    Review of Systems   Constitutional:  Positive for fatigue.   Respiratory:  Negative for shortness of breath.    Cardiovascular:  Negative for chest pain.   Gastrointestinal:  Negative for abdominal pain.   Genitourinary:  Negative for dysuria and frequency.   Neurological:  Negative for dizziness and headaches.     Objective:     Vital Signs (Most Recent):  Temp: 98.1 °F (36.7 °C) (08/21/23 0325)  Pulse: 100 (08/21/23 0945)  Resp: 18 (08/21/23 0405)  BP: 116/73 (08/21/23 0945)  SpO2: (!) 93 % (08/21/23 0655) Vital Signs (24h Range):  Temp:  [96.4 °F (35.8 °C)-98.5 °F (36.9 °C)] 98.1 °F (36.7 °C)  Pulse:  [] 100  Resp:  [17-19] 18  SpO2:  [92 %-100 %] 93 %  BP: (116-144)/(62-89) 116/73     Weight: 59.6 kg (131 lb 6.3 oz)  Body mass index is 22.55 kg/m².    Intake/Output Summary (Last 24 hours) at 8/21/2023 1132  Last data filed at 8/20/2023 2150  Gross per 24 hour   Intake 250 ml   Output 1300 ml   Net -1050 ml           Physical Exam  Constitutional:       Appearance: She is  "ill-appearing.   HENT:      Head: Normocephalic and atraumatic.   Cardiovascular:      Rate and Rhythm: Normal rate and regular rhythm.      Pulses: Normal pulses.      Heart sounds: Normal heart sounds.   Pulmonary:      Effort: Pulmonary effort is normal.      Comments: Crackles in all lung fields  Abdominal:      General: Abdomen is flat.      Tenderness: There is no abdominal tenderness.   Musculoskeletal:      Right lower leg: No edema.      Left lower leg: No edema.   Skin:     General: Skin is warm.      Capillary Refill: Capillary refill takes less than 2 seconds.      Coloration: Skin is not jaundiced or pale.   Neurological:      General: No focal deficit present.      Mental Status: She is alert and oriented to person, place, and time.   Psychiatric:         Mood and Affect: Mood normal.             Significant Labs: All pertinent labs within the past 24 hours have been reviewed.  BMP:   Recent Labs   Lab 08/21/23  0613         K 3.4*      CO2 26   BUN 9   CREATININE 0.8   CALCIUM 8.0*   MG 2.2       CBC:   Recent Labs   Lab 08/20/23  1453 08/20/23  2258 08/21/23  0613   WBC 10.19 13.40* 10.74   HGB 9.1* 7.5* 7.6*   HCT 29.2* 23.7* 24.3*    262 267       CMP:   Recent Labs   Lab 08/20/23  0444 08/20/23  0600 08/21/23  0613    144 140   K 3.4* 3.5 3.4*    109 105   CO2 24 25 26   GLU 80 77 104   BUN 10 9 9   CREATININE 0.7 0.7 0.8   CALCIUM 7.6* 7.6* 8.0*   PROT 6.2 6.0 6.3   ALBUMIN 1.5* 1.5* 1.5*   BILITOT 4.1* 4.0* 5.0*   ALKPHOS 726* 701* 670*   * 152* 170*   ALT 67* 66* 80*   ANIONGAP 12 10 9       Lactic Acid: No results for input(s): "LACTATE" in the last 48 hours.  Troponin: No results for input(s): "TROPONINI", "TROPONINIHS" in the last 48 hours.  Urine Culture: No results for input(s): "LABURIN" in the last 48 hours.  Urine Studies: No results for input(s): "COLORU", "APPEARANCEUA", "PHUR", "SPECGRAV", "PROTEINUA", "GLUCUA", "KETONESU", "BILIRUBINUA", " ""OCCULTUA", "NITRITE", "UROBILINOGEN", "LEUKOCYTESUR", "RBCUA", "WBCUA", "BACTERIA", "SQUAMEPITHEL", "HYALINECASTS" in the last 48 hours.    Invalid input(s): "WALTERR"    Significant Imaging: I have reviewed all pertinent imaging results/findings within the past 24 hours.      Assessment/Plan:      * Biliary obstruction  Patient reports development of acute abdominal pain associated with nausea and vomiting. Patient was recently admitted (6/23) for suspected biliary pancreatitis and biliary stricture treated with biliary sphincterotomy, biliary stent, and cholecystectomy s/p ERCP w/ metal stent on 8/17, abdominal pain has resolved but T bili is rising     - Trend T bili daily  -[facility] consulted, s/p ERCP w/ biopsy on 8/17, transaminases and alk phos improving but bili is worsening  -Possible ERCP 8/22  - NPO at MN  - No elevated WBC or signs of infection, hold off on abx        Gastrointestinal hemorrhage with melena  Hgb drop from 8 to 6.1 on 7/19, improved to 7.1 after 1 unit, was unable to tolerate further transfusions due to increasing oxygen requirement, so she was given lasix, planning for second transfusion to reach goal >8 due to recent ACS on 8/22. The patient does not have a history of cirrhosis.    Plan  - 2 large bore IVs  - Type and screen  - Hgb goal >8 due recent ACS  - Correct coagulopathy (goal plt >50, INR <1.5) if present in patients without absolute contraindications.  - Hold lovenox but continue DAPT due to recent STEMI  - CBC Q12H  - s/p 1 unit PRBC  - GI consulted, no endoscopy recommended at this time  -  S/p 80mg IV pantoprazole  - Pantoprazole 40mg IV BID      Atrial fibrillation  Patient with Paroxysmal (<7 days) atrial fibrillation which is controlled currently with Beta Blocker. Patient is currently in sinus rhythm.[id number] Score: 3. . Anticoagulation not indicated due to possible bleeding, was previously on lovenox but it is being held.    Abdominal pain  See biliary obstruction, pain " is stable      CHF (congestive heart failure)  -Last week had CXR with B/L edema, , EF decreased to 40-45%  -Got IV lasix while inpatient and was discharged on 40 po daily however became dehydrated and weak and LVEDP -in the lab showed LVEDP 9, reduce dose to 20 po  - Continue losartan, jarad, and metoprolol, resume SGLT at dc  - Lasix 40mg IV daily for volume overload from transfusion    TTE 8/15/23    Left Ventricle: The left ventricle is normal in size. Ventricular mass is normal. Normal wall thickness. regional wall motion abnormalities present. See diagram for wall motion findings. There is moderately reduced systolic function. Ejection fraction by visual approximation is 40%. Grade II diastolic dysfunction.    Left Atrium: Left atrium is mildly dilated. The left atrium volume index is 35.5 mL/m2.    Right Ventricle: Normal right ventricular cavity size. Wall thickness is normal. Right ventricle wall motion  is normal. Systolic function is normal.    Mitral Valve: There is mild regurgitation.    IVC/SVC: Normal venous pressure at 3 mmHg.    Liver parenchyma is inhomgenous.  Clinical correlation is required.      STEMI (ST elevation myocardial infarction)  -Started having near syncope around 4 pm on the day of admission  -Bedside echo in the ER showed an EF 45-50% and norma-lateral and infero-lateral hypokinesis  -She underwent Successful primary PCI of ramus intermedius and lateral branch with Pronto thrombectomy and 3X16 GINGER     Plan  - Continue aspirin 81 mg daily, stop after 1 month to avoid triple therapy  - Loaded with brilinta 180, loaded with plavix 600 8/15/23, and 75 qd there after  - Continue high intensity statin therapy (LDL goal < 70)  - TTE shows ejection fraction of about 40% associated with grade II diastolic dysfunction.  - Continue lasix 40mg daily IV for now  - Continue ASA/plavix      Liver lesion  Noted on prior admissions c/f metastatic disease with pancreatic primary, biopsy  done 8/17, pending results      Other specified anemias  See GIB above      History of pancreatitis  Recent (6/2023) suspected biliary pancreatitis and biliary stricture treated with biliary sphincterotomy, biliary stent, and cholecystectomy.  CT A/P with new innumerable hepatic lesions:  1. Innumerable low attenuating hepatic lesions, new since the previous exam.  Malignancy remains a concern however given short-term development, correlation is needed to exclude multifocal infection/abscess in this patient status post bile duct stent placement and cholecystectomy.  Please note, there is a low attenuating focus within the gallbladder fossa, similar to the foci throughout the hepatic parenchyma..  2. Pancreatic ductal dilatation up to 4 mm, minimally increased compared to prior.  There is fullness of the pancreatic head, underlying mass is not excluded, correlation with recent ERCP advised.  3. Simple and complex bilateral renal cysts.  4. Biliary stent in place with expected pneumobilia.  5. Cholecystectomy with scattered fluid about the gallbladder fossa.  6. Moderate stool throughout the colon, may reflect developing constipation.  7.  Additional findings as above.     - concern for lesions seen on CT A/P  - Patient was found to have a hypoechoic pancreatic mass on abdominal US  - Was also found to have this mass on EUS concerning for malignancy   - EUS and ERCP 8/17/23, found to have a partially occluded stent. Bare metal stent placed in CBD  -  Multiple lesions seen on previous admit have been visualized. Per [facility] more concerning for metastatic cancer  - FNA biopsy of lesions, F/U with cytology results   - Advancing diet to regular diet  - Stopping abx    Type 2 diabetes mellitus with stage 2 chronic kidney disease and hypertension  Home Antihyperglycemic Regiment:  -Farxiga, glipizide, insulin daily at home  - Titrate and addition of insulin as needed for BG goal 140-180  - SSI with POCT accuchecks ACHS and  Diabetic diet 2000 beti  - Diabetic nutritional counseling given   - Continue home gabapentin for diabetic peripheral neuropathy  - Appreciate Endocrinology recs  - Adjust insulin and monitor closely  - 8/21, endocrine following      VTE Risk Mitigation (From admission, onward)    None          Discharge Planning   REBECCA: 8/23/2023     Code Status: Full Code   Is the patient medically ready for discharge?: No    Reason for patient still in hospital (select all that apply): Patient new problem, Patient trending condition and Treatment  Discharge Plan A: Home with family, Home Health                  Baudilio Lozada MD  Department of Hospital Medicine   [facility] - Cardiology Stepdown

## 2023-08-21 NOTE — PROGRESS NOTES
[facility] - [facility]iology Stepdown  [facility] - Gastroenterology  Progress Note    Patient Name: Mariann Huff  MRN: [id number]  Admission Date: 8/14/2023  Hospital Length of Stay: 7 days  Code Status: Full Code   Attending Provider: Baudilio Lozada MD  Consulting Provider: CARLYLE LINDSEY NP  Primary Care Physician: Jasbir Haney MD  Principal Problem: STEMI (ST elevation myocardial infarction)    HPI: Ms. Mariann Huff is a 63 year old female for whom [facility] is consulted for evaluation of hyperbilirubinemia. She has a PMH significant for CAD (status post PCI in 2014), DVT (on Apixaban), MURTAZA, pancreatitis (diagnosed 05/2023; attributed to TRULICITY usage initially; status post EUS/ERCP in 06/2023 showing gallbladder sludge and stricture in lower third of main duct suspected to be from pancreatitis with plastic stent placed into CBD; due for removal in 08/2023), peripheral artery disease (on PLETAL), and T2DM. She has a PSH significant for subtotal cholecystectomy (late 06/2023).      Hospital Course:   Patient was admitted to [facility] on 08/14 for management of posterior STEMI. She was taken to cath lab on admission and underwent PCI with three GINGER placement and initiated on PLAVIX. On 08/16, patient noted to develop worsening hyperbilirubinemia since admission (2 from 1.1), mildly elevated lipase (61), and transaminitis (, ALT 50's) associated with abdominal pain. CT A/P obtained on 08/16 showed multiple hepatic lesions, biliary stent in place with pneumobilia, evidence of prior cholecystectomy, and a mass measuring 4.7 X 3.3 cm at the duodenal/pancreatic head. [facility] consulted for further evaluation.      On initial bedside encounter, patient reports sudden onset of bilateral upper abdominal pain on 08/16. She reports symptom association with daily non-bloody emesis since approximately 05/2023 and approximately 30 pounds of unintentional weight loss and eye yellowing since spring of 2023. She denies symptom association  with melena, hematochezia, pale colored stools, family history of gastric, colon, and pancreatic cancer, and use of ETOH or cigarettes.       Subjective:     Interval History:   - Tbili trending up 5.0 today   - Alk Phos - trending down  - AST/ALT - trending up  - No abdominal pain, nausea, vomiting    Review of Systems   Constitutional:  Positive for activity change. Negative for appetite change, chills and fever.   HENT:  Negative for trouble swallowing.    Respiratory:  Negative for shortness of breath.    Cardiovascular:  Negative for chest pain.   Gastrointestinal:  Negative for abdominal distention, abdominal pain, diarrhea, nausea and vomiting.     Objective:     Vital Signs (Most Recent):  Temp: 98.1 °F (36.7 °C) (08/21/23 0325)  Pulse: 99 (08/21/23 0405)  Resp: 18 (08/21/23 0405)  BP: 139/89 (08/21/23 0325)  SpO2: (!) 93 % (08/21/23 0655) Vital Signs (24h Range):  Temp:  [96.4 °F (35.8 °C)-98.5 °F (36.9 °C)] 98.1 °F (36.7 °C)  Pulse:  [] 99  Resp:  [17-19] 18  SpO2:  [92 %-100 %] 93 %  BP: (135-144)/(62-89) 139/89     Weight: 59.6 kg (131 lb 6.3 oz) (08/21/23 0800)  Body mass index is 22.55 kg/m².      Intake/Output Summary (Last 24 hours) at 8/21/2023 0938  Last data filed at 8/20/2023 2150  Gross per 24 hour   Intake 250 ml   Output 1300 ml   Net -1050 ml       Lines/Drains/Airways       Peripheral Intravenous Line  Duration                  Peripheral IV - Single Lumen 08/16/23 0934 20 G;1 3/4 in Left Upper Arm 5 days         Peripheral IV - Single Lumen 08/19/23 1440 18 G;2 in Anterior;Right Upper Arm 1 day                     Physical Exam  Vitals and nursing note reviewed.   Constitutional:       General: She is not in acute distress.  Eyes:      Comments: Jaundice sclera   Cardiovascular:      Rate and Rhythm: Normal rate and regular rhythm.      Pulses: Normal pulses.      Heart sounds: Normal heart sounds.   Pulmonary:      Effort: Pulmonary effort is normal. No respiratory distress.    Abdominal:      General: Bowel sounds are normal. There is no distension.      Palpations: Abdomen is soft.      Tenderness: There is no abdominal tenderness.   Skin:     General: Skin is warm and dry.      Capillary Refill: Capillary refill takes 2 to 3 seconds.      Coloration: Skin is not jaundiced.   Neurological:      Mental Status: She is alert and oriented to person, place, and time.          Significant Labs:  CBC:   Recent Labs   Lab 08/20/23  1453 08/20/23  2258 08/21/23  0613   WBC 10.19 13.40* 10.74   HGB 9.1* 7.5* 7.6*   HCT 29.2* 23.7* 24.3*    262 267     CMP:   Recent Labs   Lab 08/21/23  0613      CALCIUM 8.0*   ALBUMIN 1.5*   PROT 6.3      K 3.4*   CO2 26      BUN 9   CREATININE 0.8   ALKPHOS 670*   ALT 80*   *   BILITOT 5.0*     All pertinent lab results from the last 24 hours have been reviewed.      Significant Imaging:  Imaging results within the past 24 hours have been reviewed.    Assessment/Plan:     GI  Hyperbilirubinemia  This is a 63 year old female with a PMH significant for CAD (status post PCI in 2014), DVT (on Apixaban), MURTAZA, pancreatitis (diagnosed 05/2023; attributed to TRULICITY usage initially; status post EUS/ERCP in 06/2023 showing gallbladder sludge and stricture in lower third of main duct suspected to be from pancreatitis with plastic stent placed into CBD; due for removal in 08/2023), peripheral artery disease (on PLETAL), and T2DM and a PSH significant for subtotal cholecystectomy (late 06/2023) who was admitted to [facility] on 08/14 for posterior STEMI requiring PCI on admission and initiation of ASA and PLAVIX. Hospital course associated with onset of abdominal pain and progressive hyperbilirubinemia since 08/16 with CT A/P non-contrast from that time concerning for pancreatic head mass with multiple hepatic lesions concerning for underlying pancreatic cancer.     Recommendations:     - Abdominal X-ray today to assess for migration of  bilary stent  - if stent migration - plan for repeat ERCP to replace stent  - Trend Tbili and LFTs - if continuing to elevate consider ERCP tomorrow  - NPO at midnight for possible repeat ERCP tomorrow        Thank you for your consult. I will follow-up with patient. Please contact us if you have any additional questions.    CARLYLE LINDSEY NP  [facility] - Gastroenterology  [facility] - Cardiology Stepdown

## 2023-08-21 NOTE — ASSESSMENT & PLAN NOTE
Hgb drop from 8 to 6.1 on 7/19, improved to 7.1 after 1 unit, was unable to tolerate further transfusions due to increasing oxygen requirement, so she was given lasix, planning for second transfusion to reach goal >8 due to recent ACS on 8/22. The patient does not have a history of cirrhosis.    Plan  - 2 large bore IVs  - Type and screen  - Hgb goal >8 due recent ACS  - Correct coagulopathy (goal plt >50, INR <1.5) if present in patients without absolute contraindications.  - Hold lovenox but continue DAPT due to recent STEMI  - CBC Q12H  - s/p 1 unit PRBC  - GI consulted, no endoscopy recommended at this time  -  S/p 80mg IV pantoprazole  - Pantoprazole 40mg IV BID

## 2023-08-21 NOTE — CARE UPDATE
BG goal 140-180    -A1C   Lab Results   Component Value Date    HGBA1C 8.8 (H) 07/10/2023       -HOME REGIMEN: Glipizide ER 10mg before breakfast. Levemir 2u daily on a sliding scale     -INPATIENT REGIMEN: Novolog Correction Scale     -GLUCOSE TREND FOR THE PAST 24HRS: 104-198    -NO HYPOGYCEMIAS NOTED     -TOLERATING 75% OF PO DIET     -Diet: Diet diabetic Low Sodium,2gm; 2000 Calorie  Diet NPO Except for: Medication, Sips with Medication      -Steroids -  N/A     -Tube Feeds - N/A       Remains in CSU. NAEON. BG stable and within goal ranges on prn SQ insulin correction scale.       Plan:  -Continue Moderate Dose Correction Scale  -BG monitoring ac/hs    Discharge planning: TBD    Endocrine to continue to follow    ** Please call Endocrine for any BG related issues **

## 2023-08-21 NOTE — TREATMENT PLAN
GI Treatment Plan    Mariann Huff is a 63 y.o. female admitted to hospital 8/14/2023 (Hospital Day: 8) due to STEMI (ST elevation myocardial infarction).     Interval History  No overt signs of bleeding. H&H stable.    Objective  Temp:  [96.4 °F (35.8 °C)-98.5 °F (36.9 °C)] 98.1 °F (36.7 °C) (08/21 0325)  Pulse:  [] 99 (08/21 0405)  BP: (135-144)/(62-89) 139/89 (08/21 0325)  Resp:  [17-19] 18 (08/21 0405)  SpO2:  [92 %-100 %] 93 % (08/21 0655)    Laboratory    Recent Labs   Lab 08/20/23  1453 08/20/23  2258 08/21/23 0613   HGB 9.1* 7.5* 7.6*       Lab Results   Component Value Date    WBC 10.74 08/21/2023    HGB 7.6 (L) 08/21/2023    HCT 24.3 (L) 08/21/2023    MCV 82 08/21/2023     08/21/2023       Lab Results   Component Value Date     08/21/2023    K 3.4 (L) 08/21/2023     08/21/2023    CO2 26 08/21/2023    BUN 9 08/21/2023    CREATININE 0.8 08/21/2023    CALCIUM 8.0 (L) 08/21/2023    ANIONGAP 9 08/21/2023    ESTGFRAFRICA >60.0 06/14/2022    EGFRNONAA 54.3 (A) 06/14/2022       Lab Results   Component Value Date    ALT 80 (H) 08/21/2023     (H) 08/21/2023    ALKPHOS 670 (H) 08/21/2023    BILITOT 5.0 (H) 08/21/2023       Lab Results   Component Value Date    INR 1.1 08/21/2023    INR 1.1 08/20/2023    INR 1.2 08/19/2023   Assessment  Mariann Huff is a 63 y.o. female with history of CAD s/p GINGER to ostial LAD in 2014, HTN, HLD, DM2, MURTAZA who presented to the ED after a near syncopal event at home. Found to have a STEMI, taken to the cath lab and is now s/p PCI. GI is consulted for anemia and concerns for dark bowel movements/melena. Hgb was 8.4 on 8/18- > 6.1 on 8/19. S/p 1 unit of PRBC's. Hgb 7.6 today. Rectal exam with brown stool, no overt signs of GIB at this time.    Plan  - H&H. No overt signs of bleeding. Consider alternative etiologies/causes to anemia   - No current indication for endoscopic evaluation at this time.   - We are signing-off. Please call with any  questions.    Thank you for involving us in the care of Mariann Huff. Please call with any additional questions, concerns or changes in the patient's clinical status.    Sol Gaona MD  Gastroenterology

## 2023-08-21 NOTE — SUBJECTIVE & OBJECTIVE
Interval History: lanie GUILLEN rising, possible ERCP 8/22    Review of Systems   Constitutional:  Positive for fatigue.   Respiratory:  Negative for shortness of breath.    Cardiovascular:  Negative for chest pain.   Gastrointestinal:  Negative for abdominal pain.   Genitourinary:  Negative for dysuria and frequency.   Neurological:  Negative for dizziness and headaches.     Objective:     Vital Signs (Most Recent):  Temp: 98.1 °F (36.7 °C) (08/21/23 0325)  Pulse: 100 (08/21/23 0945)  Resp: 18 (08/21/23 0405)  BP: 116/73 (08/21/23 0945)  SpO2: (!) 93 % (08/21/23 0655) Vital Signs (24h Range):  Temp:  [96.4 °F (35.8 °C)-98.5 °F (36.9 °C)] 98.1 °F (36.7 °C)  Pulse:  [] 100  Resp:  [17-19] 18  SpO2:  [92 %-100 %] 93 %  BP: (116-144)/(62-89) 116/73     Weight: 59.6 kg (131 lb 6.3 oz)  Body mass index is 22.55 kg/m².    Intake/Output Summary (Last 24 hours) at 8/21/2023 1132  Last data filed at 8/20/2023 2150  Gross per 24 hour   Intake 250 ml   Output 1300 ml   Net -1050 ml           Physical Exam  Constitutional:       Appearance: She is ill-appearing.   HENT:      Head: Normocephalic and atraumatic.   Cardiovascular:      Rate and Rhythm: Normal rate and regular rhythm.      Pulses: Normal pulses.      Heart sounds: Normal heart sounds.   Pulmonary:      Effort: Pulmonary effort is normal.      Comments: Crackles in all lung fields  Abdominal:      General: Abdomen is flat.      Tenderness: There is no abdominal tenderness.   Musculoskeletal:      Right lower leg: No edema.      Left lower leg: No edema.   Skin:     General: Skin is warm.      Capillary Refill: Capillary refill takes less than 2 seconds.      Coloration: Skin is not jaundiced or pale.   Neurological:      General: No focal deficit present.      Mental Status: She is alert and oriented to person, place, and time.   Psychiatric:         Mood and Affect: Mood normal.             Significant Labs: All pertinent labs within the past 24 hours have  "been reviewed.  BMP:   Recent Labs   Lab 08/21/23  0613         K 3.4*      CO2 26   BUN 9   CREATININE 0.8   CALCIUM 8.0*   MG 2.2       CBC:   Recent Labs   Lab 08/20/23  1453 08/20/23  2258 08/21/23  0613   WBC 10.19 13.40* 10.74   HGB 9.1* 7.5* 7.6*   HCT 29.2* 23.7* 24.3*    262 267       CMP:   Recent Labs   Lab 08/20/23  0444 08/20/23  0600 08/21/23  0613    144 140   K 3.4* 3.5 3.4*    109 105   CO2 24 25 26   GLU 80 77 104   BUN 10 9 9   CREATININE 0.7 0.7 0.8   CALCIUM 7.6* 7.6* 8.0*   PROT 6.2 6.0 6.3   ALBUMIN 1.5* 1.5* 1.5*   BILITOT 4.1* 4.0* 5.0*   ALKPHOS 726* 701* 670*   * 152* 170*   ALT 67* 66* 80*   ANIONGAP 12 10 9       Lactic Acid: No results for input(s): "LACTATE" in the last 48 hours.  Troponin: No results for input(s): "TROPONINI", "TROPONINIHS" in the last 48 hours.  Urine Culture: No results for input(s): "LABURIN" in the last 48 hours.  Urine Studies: No results for input(s): "COLORU", "APPEARANCEUA", "PHUR", "SPECGRAV", "PROTEINUA", "GLUCUA", "KETONESU", "BILIRUBINUA", "OCCULTUA", "NITRITE", "UROBILINOGEN", "LEUKOCYTESUR", "RBCUA", "WBCUA", "BACTERIA", "SQUAMEPITHEL", "HYALINECASTS" in the last 48 hours.    Invalid input(s): "WRIGHTSUR"    Significant Imaging: I have reviewed all pertinent imaging results/findings within the past 24 hours.  "

## 2023-08-21 NOTE — PT/OT/SLP PROGRESS
Physical Therapy Treatment    Patient Name:  Mariann Huff   MRN:  [id number]  Admitting Diagnosis:  Biliary obstruction   Recent Surgery: Procedure(s) (LRB):  ERCP (ENDOSCOPIC RETROGRADE CHOLANGIOPANCREATOGRAPHY) (N/A)  ULTRASOUND, UPPER GI TRACT, ENDOSCOPIC 4 Days Post-Op  Admit Date: 8/14/2023  Length of Stay: 7 days    Recommendations:     Discharge Recommendations:  other (see comments)   Discharge Equipment Recommendations: walker, rolling   Barriers to discharge: None    Plan:     During this hospitalization, patient to be seen 3 x/week to address the identified rehab impairments via gait training, therapeutic activities, therapeutic exercises, neuromuscular re-education and progress towards the established goals.  Plan of Care Expires:  09/14/23  Plan of Care Reviewed with: patient, spouse    Assessment:     Mariann Huff is a 63 y.o. female admitted with a medical diagnosis of Biliary obstruction. Pt agreeable to session and tolerated session well. Pt demonstrating improvements evident through increased ambulation distance and decreased assistance level required for ambulation. Pt still with LE deficits evident through decreased force production of LE during STS, requiring moderate assistance for stand. Patient would benefit from skilled therapy services to maximize safety and independence, increase activity tolerance, decrease fall risk, decrease caregiver burden, improve QOL, improve patient's functional mobility, and decrease risk of contractures and pressure sores. Pt would benefit from home health physical therapy to remedy her impairments.      Problem List: weakness, impaired endurance, impaired functional mobility, gait instability, impaired self care skills, impaired cardiopulmonary response to activity.  Rehab Prognosis: Good; patient would benefit from acute skilled PT services to address these deficits and reach maximum level of function.      Goals:   Multidisciplinary Problems        "Physical Therapy Goals          Problem: Physical Therapy    Goal Priority Disciplines Outcome Goal Variances Interventions   Physical Therapy Goal     PT, PT/OT Ongoing, Progressing     Description: Goals to be met by: 23     Patient will increase functional independence with mobility by performin. Supine to sit with [place]  2. Sit to stand transfer with Supervision  3. Bed to chair transfer with Supervision using LRAD  4. Gait  x 75 feet with Stand-by Assistance using LRAD  5. Ascend/Descend 6 inch curb step with Stand-by Assistance with/without LRAD  6. Lower extremity exercise program x30 reps per handout, with independence                         Subjective     RN notified prior to session. Spouse present during PT session. Patient agreeable to PT treatment session.    Chief Complaint: decreased endurance  Patient/Family Comments/goals: increased mobility  Pain/Comfort:  Pain Rating 1: 0/10  Pain Rating Post-Intervention 1: 0/10      Objective:       Patient found up in chair with: telemetry, pulse ox (continuous)   Cognition:   Alert and Cooperative  Patient is oriented to Person, Place, Time, Situation  General Precautions: Standard, Cardiac fall   Orthopedic Precautions:N/A   Braces: N/A   Body mass index is 22.55 kg/m².  Oxygen Device: Room Air  Vitals: /72   Pulse 91   Temp 98.1 °F (36.7 °C) (Oral)   Resp 18   Ht 5' 4" (1.626 m)   Wt 59.6 kg (131 lb 6.3 oz)   LMP  (LMP Unknown)   SpO2 96%   BMI 22.55 kg/m²     Outcome Measures:  AM-PAC 6 CLICK MOBILITY  Turning over in bed (including adjusting bedclothes, sheets and blankets)?: 3  Sitting down on and standing up from a chair with arms (e.g., wheelchair, bedside commode, etc.): 3  Moving from lying on back to sitting on the side of the bed?: 3  Moving to and from a bed to a chair (including a wheelchair)?: 3  Need to walk in hospital room?: 3  Climbing 3-5 steps with a railing?: 2  Basic Mobility Total Score: 17     Functional " Mobility:    Bed Mobility:   Pt found/returned to bedside chair    Transfers:   Sit <> Stand Transfer: moderate assistance with rolling walker   Pt required cueing for hand placement  Pt with increased reliability on UE for stand    Standing Balance:  Static Standing Balance: Fair- : requires minimal assistance or UE support in order to stand without LOB  Dynamic Standing Balance: Fair : stand independently unsupported, weight shift, and reach ipsilaterally. LOB noted when crossing midline.  Assistance Level Required: Stand-by Assistance  Patient used: rolling walker       Gait:  Patient ambulated: 160'   Patient required: contact guard  Patient used:  rolling walker   Gait Deviation(s): decreased step length and decreased [place]  Impairments due to: decreased endurance  all lines remained intact throughout ambulation trial  Gait belt utilized  Comments: Pt with no LOB or unsteadiness during trial. VSS stable and no symptoms reported.     Education:  Time provided for education, counseling and discussion of health disposition in regards to patient's current status  All questions answered within PT scope of practice and to patient's satisfaction  PT role in POC to address current functional deficits  Pt educated on proper body mechanics, safety techniques, and energy conservation with PT facilitation and cueing throughout session  Pt to ambulate with nsg in order to maintain functional mobility    Patient left up in chair with all lines intact, call button in reach, and spouse present.    Time Tracking:     PT Received On: 08/21/23  PT Start Time: 1020     PT Stop Time: 1035  PT Total Time (min): 15 min     Billable Minutes:   Gait Training 10 minutes    Treatment Type: Treatment  PT/PTA: PT       8/21/2023

## 2023-08-21 NOTE — ASSESSMENT & PLAN NOTE
Patient reports development of acute abdominal pain associated with nausea and vomiting. Patient was recently admitted (6/23) for suspected biliary pancreatitis and biliary stricture treated with biliary sphincterotomy, biliary stent, and cholecystectomy s/p ERCP w/ metal stent on 8/17, abdominal pain has resolved but T bili is rising     - Trend T bili daily  -AES consulted, s/p ERCP w/ biopsy on 8/17, transaminases and alk phos improving but bili is worsening  -Possible ERCP 8/22  - NPO at MN  - No elevated WBC or signs of infection, hold off on abx

## 2023-08-21 NOTE — ASSESSMENT & PLAN NOTE
-Last week had CXR with B/L edema, , EF decreased to 40-45%  -Got IV lasix while inpatient and was discharged on 40 po daily however became dehydrated and weak and LVEDP -in the lab showed LVEDP 9, reduce dose to 20 po  - Continue losartan, jarad, and metoprolol, resume SGLT at dc  - Lasix 40mg IV daily for volume overload from transfusion    TTE 8/15/23    Left Ventricle: The left ventricle is normal in size. Ventricular mass is normal. Normal wall thickness. regional wall motion abnormalities present. See diagram for wall motion findings. There is moderately reduced systolic function. Ejection fraction by visual approximation is 40%. Grade II diastolic dysfunction.    Left Atrium: Left atrium is mildly dilated. The left atrium volume index is 35.5 mL/m2.    Right Ventricle: Normal right ventricular cavity size. Wall thickness is normal. Right ventricle wall motion  is normal. Systolic function is normal.    Mitral Valve: There is mild regurgitation.    IVC/SVC: Normal venous pressure at 3 mmHg.    Liver parenchyma is inhomgenous.  Clinical correlation is required.

## 2023-08-21 NOTE — NURSING
Pt at this time resting with  at bedside OOB this am ambulates to bedside commode had large soft stool and voiding adequate urine this shift, blood glucose stable medicated per SSI scale. Received one unit of PRBC's tolerated well. Vitals stable present with no complaints of pain this shift

## 2023-08-21 NOTE — ASSESSMENT & PLAN NOTE
Home Antihyperglycemic Regiment:  -Farxiga, glipizide, insulin daily at home  - Titrate and addition of insulin as needed for BG goal 140-180  - SSI with POCT accuchecks ACHS and Diabetic diet 2000 beti  - Diabetic nutritional counseling given   - Continue home gabapentin for diabetic peripheral neuropathy  - Appreciate Endocrinology recs  - Adjust insulin and monitor closely  - 8/21, endocrine following

## 2023-08-21 NOTE — ASSESSMENT & PLAN NOTE
This is a 63 year old female with a PMH significant for CAD (status post PCI in 2014), DVT (on Apixaban), MURTAZA, pancreatitis (diagnosed 05/2023; attributed to TRULICITY usage initially; status post EUS/ERCP in 06/2023 showing gallbladder sludge and stricture in lower third of main duct suspected to be from pancreatitis with plastic stent placed into CBD; due for removal in 08/2023), peripheral artery disease (on PLETAL), and T2DM and a PSH significant for subtotal cholecystectomy (late 06/2023) who was admitted to [facility] on 08/14 for posterior STEMI requiring PCI on admission and initiation of ASA and PLAVIX. Hospital course associated with onset of abdominal pain and progressive hyperbilirubinemia since 08/16 with CT A/P non-contrast from that time concerning for pancreatic head mass with multiple hepatic lesions concerning for underlying pancreatic cancer.     Recommendations:     - Abdominal X-ray today to assess for migration of bilary stent  - if stent migration - plan for repeat ERCP to replace stent

## 2023-08-21 NOTE — ASSESSMENT & PLAN NOTE
-Started having near syncope around 4 pm on the day of admission  -Bedside echo in the ER showed an EF 45-50% and norma-lateral and infero-lateral hypokinesis  -She underwent Successful primary PCI of ramus intermedius and lateral branch with Pronto thrombectomy and 3X16 GINGER     Plan  - Continue aspirin 81 mg daily, stop after 1 month to avoid triple therapy  - Loaded with brilinta 180, loaded with plavix 600 8/15/23, and 75 qd there after  - Continue high intensity statin therapy (LDL goal < 70)  - TTE shows ejection fraction of about 40% associated with grade II diastolic dysfunction.  - Continue lasix 40mg daily IV for now  - Continue ASA/plavix

## 2023-08-21 NOTE — PT/OT/SLP PROGRESS
Occupational Therapy   Treatment    Name: Mariann Huff  MRN: [id number]  Admitting Diagnosis:  Biliary obstruction  4 Days Post-Op    Recommendations:     Discharge Recommendations: other (see comments), home health OT  Discharge Equipment Recommendations:  walker, rolling  Barriers to discharge:       Assessment:     Mariann Huff is a 63 y.o. female with a medical diagnosis of Biliary obstruction.  She presents with impaired ADL and mobility performance deficits. Pt found upright in bed and agreeable for therapy. Pt limited today 2/2 generalized weakness. Pt session terminated early due to STAT imaging at bedside. Prior to imaging, pt able to safely complete functional mobility into restroom for toileting. Pt was CGA-min A with narrow [place]. Pt would benefit from use of a RW for balance. Pt would benefit from continued OT skilled services 3x/wk to improve daily living skills to optimize QOL.  Pt is recommended to discharge to other at this time.   Performance deficits affecting function are weakness, impaired functional mobility, impaired endurance, gait instability, impaired self care skills, impaired cardiopulmonary response to activity.     Rehab Prognosis:  Good; patient would benefit from acute skilled OT services to address these deficits and reach maximum level of function.       Plan:     Patient to be seen 3 x/week to address the above listed problems via self-care/home management, therapeutic activities, therapeutic exercises  Plan of Care Expires:    Plan of Care Reviewed with: patient    Subjective     Chief Complaint: needing to use bathroom  Patient/Family Comments/goals: improve strength  Pain/Comfort:  Pain Rating 1: 0/10  Pain Rating Post-Intervention 2: 0/10    Objective:     Communicated with: RN prior to session.  Patient found HOB elevated with telemetry, pulse ox (continuous) upon OT entry to room.    General Precautions: Standard, fall    Orthopedic Precautions:N/A  Braces:  N/A  Respiratory Status: Room air     Occupational Performance:     Bed Mobility:    Patient completed Rolling/Turning to Right with stand by assistance  Patient completed Scooting/Bridging with stand by assistance  Patient completed Supine to Sit with stand by assistance  Patient completed Sit to Supine with stand by assistance     Functional Mobility/Transfers:  Patient completed Sit <> Stand Transfer with contact guard assistance  with  hand-held assist   Patient completed Toilet Transfer Step Transfer technique with contact guard assistance with  hand-held assist  Functional Mobility: Pt stood with CGA-HHA mobilizing into restroom for seated toileting on commode. Pt was min A during moments of mobility 2/2 narrow [place] and lateral sway.    Activities of Daily Living:  Grooming: stand by assistance washing hands at sink  Toileting: stand by assistance using standard commode      [facility] 6 Click ADL: 19    Treatment & Education:  Pt educated on role of occupational therapy, POC, and safety during ADLs and functional mobility. Pt and OT discussed importance of safe, continued mobility to optimize daily living skills. Pt verbalized understanding.     White board updated during session. Pt given instruction to call for medical staff/nurse for assistance.       Patient left HOB elevated with all lines intact, call button in reach, RN notified, and radiology present    GOALS:   Multidisciplinary Problems       Occupational Therapy Goals          Problem: Occupational Therapy    Goal Priority Disciplines Outcome Interventions   Occupational Therapy Goal     OT, PT/OT Ongoing, Progressing    Description: Goals to be met by: 9/17/23 (1 month)     Patient will increase functional independence with ADLs by performing:    UE Dressing with Supervision.  LE Dressing with Supervision.  Grooming while standing at sink with Modified [place].  Toileting from toilet with Modified [place] for hygiene and clothing management.    Step transfer with Supervision  Toilet transfer to toilet with Stand-by Assistance.                         Time Tracking:     OT Date of Treatment: 08/21/23  OT Start Time: 0938  OT Stop Time: 0954  OT Total Time (min): 16 min    Billable Minutes:Self Care/Home Management 16 min    OT/DIMITRY: OT          8/21/2023

## 2023-08-21 NOTE — SUBJECTIVE & OBJECTIVE
Subjective:     Interval History:   - Tbili trending up 5.0 today   - Alk Phos - trending down  - AST/ALT - trending up  - No abdominal pain, nausea, vomiting    Review of Systems   Constitutional:  Positive for activity change. Negative for appetite change, chills and fever.   HENT:  Negative for trouble swallowing.    Respiratory:  Negative for shortness of breath.    Cardiovascular:  Negative for chest pain.   Gastrointestinal:  Negative for abdominal distention, abdominal pain, diarrhea, nausea and vomiting.     Objective:     Vital Signs (Most Recent):  Temp: 98.1 °F (36.7 °C) (08/21/23 0325)  Pulse: 99 (08/21/23 0405)  Resp: 18 (08/21/23 0405)  BP: 139/89 (08/21/23 0325)  SpO2: (!) 93 % (08/21/23 0655) Vital Signs (24h Range):  Temp:  [96.4 °F (35.8 °C)-98.5 °F (36.9 °C)] 98.1 °F (36.7 °C)  Pulse:  [] 99  Resp:  [17-19] 18  SpO2:  [92 %-100 %] 93 %  BP: (135-144)/(62-89) 139/89     Weight: 59.6 kg (131 lb 6.3 oz) (08/21/23 0800)  Body mass index is 22.55 kg/m².      Intake/Output Summary (Last 24 hours) at 8/21/2023 0938  Last data filed at 8/20/2023 2150  Gross per 24 hour   Intake 250 ml   Output 1300 ml   Net -1050 ml       Lines/Drains/Airways       Peripheral Intravenous Line  Duration                  Peripheral IV - Single Lumen 08/16/23 0934 20 G;1 3/4 in Left Upper Arm 5 days         Peripheral IV - Single Lumen 08/19/23 1440 18 G;2 in Anterior;Right Upper Arm 1 day                     Physical Exam  Vitals and nursing note reviewed.   Constitutional:       General: She is not in acute distress.  Eyes:      Comments: Jaundice sclera   Cardiovascular:      Rate and Rhythm: Normal rate and regular rhythm.      Pulses: Normal pulses.      Heart sounds: Normal heart sounds.   Pulmonary:      Effort: Pulmonary effort is normal. No respiratory distress.   Abdominal:      General: Bowel sounds are normal. There is no distension.      Palpations: Abdomen is soft.      Tenderness: There is no  abdominal tenderness.   Skin:     General: Skin is warm and dry.      Capillary Refill: Capillary refill takes 2 to 3 seconds.      Coloration: Skin is not jaundiced.   Neurological:      Mental Status: She is alert and oriented to person, place, and time.          Significant Labs:  CBC:   Recent Labs   Lab 08/20/23  1453 08/20/23  2258 08/21/23  0613   WBC 10.19 13.40* 10.74   HGB 9.1* 7.5* 7.6*   HCT 29.2* 23.7* 24.3*    262 267     CMP:   Recent Labs   Lab 08/21/23  0613      CALCIUM 8.0*   ALBUMIN 1.5*   PROT 6.3      K 3.4*   CO2 26      BUN 9   CREATININE 0.8   ALKPHOS 670*   ALT 80*   *   BILITOT 5.0*     All pertinent lab results from the last 24 hours have been reviewed.      Significant Imaging:  Imaging results within the past 24 hours have been reviewed.

## 2023-08-22 ENCOUNTER — ANESTHESIA (OUTPATIENT)
Dept: ENDOSCOPY | Facility: HOSPITAL | Age: 63
DRG: 246 | End: 2023-08-22
Payer: COMMERCIAL

## 2023-08-22 ENCOUNTER — ANESTHESIA EVENT (OUTPATIENT)
Dept: ENDOSCOPY | Facility: HOSPITAL | Age: 63
DRG: 246 | End: 2023-08-22
Payer: COMMERCIAL

## 2023-08-22 LAB
ADEQUACY: ABNORMAL
ALBUMIN SERPL BCP-MCNC: 1.4 G/DL (ref 3.5–5.2)
ALLENS TEST: ABNORMAL
ALP SERPL-CCNC: 653 U/L (ref 55–135)
ALT SERPL W/O P-5'-P-CCNC: 80 U/L (ref 10–44)
ANION GAP SERPL CALC-SCNC: 9 MMOL/L (ref 8–16)
APTT PPP: 30.7 SEC (ref 21–32)
AST SERPL-CCNC: 158 U/L (ref 10–40)
BASOPHILS # BLD AUTO: 0.01 K/UL (ref 0–0.2)
BASOPHILS NFR BLD: 0.1 % (ref 0–1.9)
BILIRUB SERPL-MCNC: 6.1 MG/DL (ref 0.1–1)
BUN SERPL-MCNC: 9 MG/DL (ref 8–23)
CALCIUM SERPL-MCNC: 8.2 MG/DL (ref 8.7–10.5)
CHLORIDE SERPL-SCNC: 105 MMOL/L (ref 95–110)
CO2 SERPL-SCNC: 24 MMOL/L (ref 23–29)
CREAT SERPL-MCNC: 0.7 MG/DL (ref 0.5–1.4)
DELSYS: ABNORMAL
DIFFERENTIAL METHOD: ABNORMAL
EOSINOPHIL # BLD AUTO: 0.3 K/UL (ref 0–0.5)
EOSINOPHIL NFR BLD: 2.6 % (ref 0–8)
EP: 5
ERYTHROCYTE [DISTWIDTH] IN BLOOD BY AUTOMATED COUNT: 17.1 % (ref 11.5–14.5)
EST. GFR  (NO RACE VARIABLE): >60 ML/MIN/1.73 M^2
FINAL PATHOLOGIC DIAGNOSIS: ABNORMAL
FIO2: 30
GLUCOSE SERPL-MCNC: 148 MG/DL (ref 70–110)
HCO3 UR-SCNC: 24.5 MMOL/L (ref 24–28)
HCT VFR BLD AUTO: 27.7 % (ref 37–48.5)
HCT VFR BLD CALC: 34 %PCV (ref 36–54)
HGB BLD-MCNC: 8.8 G/DL (ref 12–16)
IMM GRANULOCYTES # BLD AUTO: 0.07 K/UL (ref 0–0.04)
IMM GRANULOCYTES NFR BLD AUTO: 0.7 % (ref 0–0.5)
INR PPP: 1.2 (ref 0.8–1.2)
IP: 10
LYMPHOCYTES # BLD AUTO: 1.2 K/UL (ref 1–4.8)
LYMPHOCYTES NFR BLD: 11.5 % (ref 18–48)
Lab: ABNORMAL
MAGNESIUM SERPL-MCNC: 2.1 MG/DL (ref 1.6–2.6)
MCH RBC QN AUTO: 26.1 PG (ref 27–31)
MCHC RBC AUTO-ENTMCNC: 31.8 G/DL (ref 32–36)
MCV RBC AUTO: 82 FL (ref 82–98)
MODE: ABNORMAL
MONOCYTES # BLD AUTO: 1 K/UL (ref 0.3–1)
MONOCYTES NFR BLD: 9.5 % (ref 4–15)
NEUTROPHILS # BLD AUTO: 8 K/UL (ref 1.8–7.7)
NEUTROPHILS NFR BLD: 75.6 % (ref 38–73)
NRBC BLD-RTO: 0 /100 WBC
PCO2 BLDA: 34.9 MMHG (ref 35–45)
PH SMN: 7.45 [PH] (ref 7.35–7.45)
PLATELET # BLD AUTO: 269 K/UL (ref 150–450)
PMV BLD AUTO: 11.9 FL (ref 9.2–12.9)
PO2 BLDA: 95 MMHG (ref 80–100)
POC BE: 1 MMOL/L
POC IONIZED CALCIUM: 1.21 MMOL/L (ref 1.06–1.42)
POC SATURATED O2: 98 % (ref 95–100)
POC TCO2: 26 MMOL/L (ref 23–27)
POCT GLUCOSE: 100 MG/DL (ref 70–110)
POCT GLUCOSE: 124 MG/DL (ref 70–110)
POCT GLUCOSE: 135 MG/DL (ref 70–110)
POCT GLUCOSE: 144 MG/DL (ref 70–110)
POCT GLUCOSE: 149 MG/DL (ref 70–110)
POTASSIUM BLD-SCNC: 3.9 MMOL/L (ref 3.5–5.1)
POTASSIUM SERPL-SCNC: 4 MMOL/L (ref 3.5–5.1)
PROT SERPL-MCNC: 6.4 G/DL (ref 6–8.4)
PROTHROMBIN TIME: 12.4 SEC (ref 9–12.5)
RBC # BLD AUTO: 3.37 M/UL (ref 4–5.4)
SAMPLE: ABNORMAL
SITE: ABNORMAL
SODIUM BLD-SCNC: 141 MMOL/L (ref 136–145)
SODIUM SERPL-SCNC: 138 MMOL/L (ref 136–145)
WBC # BLD AUTO: 10.52 K/UL (ref 3.9–12.7)

## 2023-08-22 PROCEDURE — 63600175 PHARM REV CODE 636 W HCPCS: Performed by: NURSE ANESTHETIST, CERTIFIED REGISTERED

## 2023-08-22 PROCEDURE — C1874 STENT, COATED/COV W/DEL SYS: HCPCS | Performed by: INTERNAL MEDICINE

## 2023-08-22 PROCEDURE — 74328 X-RAY BILE DUCT ENDOSCOPY: CPT | Mod: 26,,, | Performed by: INTERNAL MEDICINE

## 2023-08-22 PROCEDURE — C9113 INJ PANTOPRAZOLE SODIUM, VIA: HCPCS | Performed by: STUDENT IN AN ORGANIZED HEALTH CARE EDUCATION/TRAINING PROGRAM

## 2023-08-22 PROCEDURE — 82803 BLOOD GASES ANY COMBINATION: CPT

## 2023-08-22 PROCEDURE — 43274 ERCP DUCT STENT PLACEMENT: CPT | Performed by: INTERNAL MEDICINE

## 2023-08-22 PROCEDURE — 43274 PR ERCP W/STENT PLCMNT BILIARY/PANCREATIC DUCT: ICD-10-PCS | Mod: 22,,, | Performed by: INTERNAL MEDICINE

## 2023-08-22 PROCEDURE — 37000009 HC ANESTHESIA EA ADD 15 MINS: Performed by: INTERNAL MEDICINE

## 2023-08-22 PROCEDURE — 83735 ASSAY OF MAGNESIUM: CPT | Performed by: INTERNAL MEDICINE

## 2023-08-22 PROCEDURE — D9220A PRA ANESTHESIA: Mod: ANES,,, | Performed by: ANESTHESIOLOGY

## 2023-08-22 PROCEDURE — 99233 PR SUBSEQUENT HOSPITAL CARE,LEVL III: ICD-10-PCS | Mod: ,,, | Performed by: STUDENT IN AN ORGANIZED HEALTH CARE EDUCATION/TRAINING PROGRAM

## 2023-08-22 PROCEDURE — 93010 EKG 12-LEAD: ICD-10-PCS | Mod: ,,, | Performed by: INTERNAL MEDICINE

## 2023-08-22 PROCEDURE — 82330 ASSAY OF CALCIUM: CPT

## 2023-08-22 PROCEDURE — 94660 CPAP INITIATION&MGMT: CPT

## 2023-08-22 PROCEDURE — C1769 GUIDE WIRE: HCPCS | Performed by: INTERNAL MEDICINE

## 2023-08-22 PROCEDURE — 27202125 HC BALLOON, EXTRACTION (ANY): Performed by: INTERNAL MEDICINE

## 2023-08-22 PROCEDURE — 25000003 PHARM REV CODE 250: Performed by: INTERNAL MEDICINE

## 2023-08-22 PROCEDURE — 20600001 HC STEP DOWN PRIVATE ROOM

## 2023-08-22 PROCEDURE — 99233 SBSQ HOSP IP/OBS HIGH 50: CPT | Mod: ,,,

## 2023-08-22 PROCEDURE — 36415 COLL VENOUS BLD VENIPUNCTURE: CPT | Performed by: INTERNAL MEDICINE

## 2023-08-22 PROCEDURE — 63600175 PHARM REV CODE 636 W HCPCS: Performed by: STUDENT IN AN ORGANIZED HEALTH CARE EDUCATION/TRAINING PROGRAM

## 2023-08-22 PROCEDURE — 84132 ASSAY OF SERUM POTASSIUM: CPT

## 2023-08-22 PROCEDURE — 25000003 PHARM REV CODE 250: Performed by: NURSE ANESTHETIST, CERTIFIED REGISTERED

## 2023-08-22 PROCEDURE — 85025 COMPLETE CBC W/AUTO DIFF WBC: CPT | Performed by: STUDENT IN AN ORGANIZED HEALTH CARE EDUCATION/TRAINING PROGRAM

## 2023-08-22 PROCEDURE — D9220A PRA ANESTHESIA: ICD-10-PCS | Mod: ANES,,, | Performed by: ANESTHESIOLOGY

## 2023-08-22 PROCEDURE — 82962 GLUCOSE BLOOD TEST: CPT | Performed by: INTERNAL MEDICINE

## 2023-08-22 PROCEDURE — 99900035 HC TECH TIME PER 15 MIN (STAT)

## 2023-08-22 PROCEDURE — 84295 ASSAY OF SERUM SODIUM: CPT

## 2023-08-22 PROCEDURE — 43274 ERCP DUCT STENT PLACEMENT: CPT | Mod: 22,,, | Performed by: INTERNAL MEDICINE

## 2023-08-22 PROCEDURE — 93010 ELECTROCARDIOGRAM REPORT: CPT | Mod: ,,, | Performed by: INTERNAL MEDICINE

## 2023-08-22 PROCEDURE — D9220A PRA ANESTHESIA: ICD-10-PCS | Mod: CRNA,,, | Performed by: NURSE ANESTHETIST, CERTIFIED REGISTERED

## 2023-08-22 PROCEDURE — 85014 HEMATOCRIT: CPT

## 2023-08-22 PROCEDURE — D9220A PRA ANESTHESIA: Mod: CRNA,,, | Performed by: NURSE ANESTHETIST, CERTIFIED REGISTERED

## 2023-08-22 PROCEDURE — 36600 WITHDRAWAL OF ARTERIAL BLOOD: CPT

## 2023-08-22 PROCEDURE — 80053 COMPREHEN METABOLIC PANEL: CPT

## 2023-08-22 PROCEDURE — 74328 X-RAY BILE DUCT ENDOSCOPY: CPT | Mod: TC | Performed by: INTERNAL MEDICINE

## 2023-08-22 PROCEDURE — 85610 PROTHROMBIN TIME: CPT | Performed by: INTERNAL MEDICINE

## 2023-08-22 PROCEDURE — 85730 THROMBOPLASTIN TIME PARTIAL: CPT | Performed by: INTERNAL MEDICINE

## 2023-08-22 PROCEDURE — 99233 SBSQ HOSP IP/OBS HIGH 50: CPT | Mod: ,,, | Performed by: STUDENT IN AN ORGANIZED HEALTH CARE EDUCATION/TRAINING PROGRAM

## 2023-08-22 PROCEDURE — 83605 ASSAY OF LACTIC ACID: CPT

## 2023-08-22 PROCEDURE — 37799 UNLISTED PX VASCULAR SURGERY: CPT

## 2023-08-22 PROCEDURE — 99233 PR SUBSEQUENT HOSPITAL CARE,LEVL III: ICD-10-PCS | Mod: ,,,

## 2023-08-22 PROCEDURE — 74328 PR  X-RAY FOR BILE DUCT ENDOSCOPY: ICD-10-PCS | Mod: 26,,, | Performed by: INTERNAL MEDICINE

## 2023-08-22 PROCEDURE — 25500020 PHARM REV CODE 255: Performed by: INTERNAL MEDICINE

## 2023-08-22 PROCEDURE — 37000008 HC ANESTHESIA 1ST 15 MINUTES: Performed by: INTERNAL MEDICINE

## 2023-08-22 PROCEDURE — 93005 ELECTROCARDIOGRAM TRACING: CPT

## 2023-08-22 DEVICE — STENT SYSTEM RMV
Type: IMPLANTABLE DEVICE | Site: BILE DUCT | Status: FUNCTIONAL
Brand: WALLFLEX BILIARY

## 2023-08-22 RX ORDER — MIDAZOLAM HYDROCHLORIDE 1 MG/ML
INJECTION, SOLUTION INTRAMUSCULAR; INTRAVENOUS
Status: DISCONTINUED | OUTPATIENT
Start: 2023-08-22 | End: 2023-08-22

## 2023-08-22 RX ORDER — PHENYLEPHRINE HYDROCHLORIDE 10 MG/ML
INJECTION INTRAVENOUS
Status: DISCONTINUED | OUTPATIENT
Start: 2023-08-22 | End: 2023-08-22

## 2023-08-22 RX ORDER — ACETAMINOPHEN 650 MG/1
650 SUPPOSITORY RECTAL EVERY 8 HOURS PRN
Status: DISCONTINUED | OUTPATIENT
Start: 2023-08-23 | End: 2023-09-01 | Stop reason: HOSPADM

## 2023-08-22 RX ORDER — ETOMIDATE 2 MG/ML
INJECTION INTRAVENOUS
Status: DISCONTINUED | OUTPATIENT
Start: 2023-08-22 | End: 2023-08-22

## 2023-08-22 RX ORDER — SODIUM CHLORIDE 0.9 % (FLUSH) 0.9 %
10 SYRINGE (ML) INJECTION
Status: DISCONTINUED | OUTPATIENT
Start: 2023-08-22 | End: 2023-08-23 | Stop reason: HOSPADM

## 2023-08-22 RX ORDER — PROPOFOL 10 MG/ML
INJECTION, EMULSION INTRAVENOUS CONTINUOUS PRN
Status: DISCONTINUED | OUTPATIENT
Start: 2023-08-22 | End: 2023-08-22

## 2023-08-22 RX ORDER — LIDOCAINE HYDROCHLORIDE 20 MG/ML
INJECTION INTRAVENOUS
Status: DISCONTINUED | OUTPATIENT
Start: 2023-08-22 | End: 2023-08-22

## 2023-08-22 RX ORDER — PROPOFOL 10 MG/ML
VIAL (ML) INTRAVENOUS
Status: DISCONTINUED | OUTPATIENT
Start: 2023-08-22 | End: 2023-08-22

## 2023-08-22 RX ADMIN — PANTOPRAZOLE SODIUM 40 MG: 40 INJECTION, POWDER, FOR SOLUTION INTRAVENOUS at 08:08

## 2023-08-22 RX ADMIN — PHENYLEPHRINE HYDROCHLORIDE 100 MCG: 10 INJECTION INTRAVENOUS at 04:08

## 2023-08-22 RX ADMIN — SODIUM CHLORIDE: 0.9 INJECTION, SOLUTION INTRAVENOUS at 03:08

## 2023-08-22 RX ADMIN — ASPIRIN 81 MG: 81 TABLET, COATED ORAL at 09:08

## 2023-08-22 RX ADMIN — SPIRONOLACTONE 12.5 MG: 25 TABLET ORAL at 09:08

## 2023-08-22 RX ADMIN — LOSARTAN POTASSIUM 25 MG: 25 TABLET, FILM COATED ORAL at 09:08

## 2023-08-22 RX ADMIN — ESCITALOPRAM OXALATE 10 MG: 10 TABLET ORAL at 09:08

## 2023-08-22 RX ADMIN — GABAPENTIN 300 MG: 300 CAPSULE ORAL at 09:08

## 2023-08-22 RX ADMIN — PROPOFOL 75 MCG/KG/MIN: 10 INJECTION, EMULSION INTRAVENOUS at 03:08

## 2023-08-22 RX ADMIN — ETOMIDATE 4 MG: 2 INJECTION INTRAVENOUS at 03:08

## 2023-08-22 RX ADMIN — CLOPIDOGREL BISULFATE 75 MG: 75 TABLET ORAL at 09:08

## 2023-08-22 RX ADMIN — LIDOCAINE HYDROCHLORIDE 75 MG: 20 INJECTION INTRAVENOUS at 03:08

## 2023-08-22 RX ADMIN — PHENYLEPHRINE HYDROCHLORIDE 100 MCG: 10 INJECTION INTRAVENOUS at 03:08

## 2023-08-22 RX ADMIN — ATORVASTATIN CALCIUM 40 MG: 40 TABLET, FILM COATED ORAL at 08:08

## 2023-08-22 RX ADMIN — PROPOFOL 40 MG: 10 INJECTION, EMULSION INTRAVENOUS at 03:08

## 2023-08-22 RX ADMIN — Medication 400 MG: at 09:08

## 2023-08-22 RX ADMIN — GABAPENTIN 600 MG: 300 CAPSULE ORAL at 08:08

## 2023-08-22 RX ADMIN — MIDAZOLAM HYDROCHLORIDE 2 MG: 1 INJECTION, SOLUTION INTRAMUSCULAR; INTRAVENOUS at 03:08

## 2023-08-22 RX ADMIN — METOPROLOL SUCCINATE 12.5 MG: 25 TABLET, EXTENDED RELEASE ORAL at 09:08

## 2023-08-22 RX ADMIN — Medication 400 MG: at 08:08

## 2023-08-22 RX ADMIN — PANTOPRAZOLE SODIUM 40 MG: 40 INJECTION, POWDER, FOR SOLUTION INTRAVENOUS at 09:08

## 2023-08-22 NOTE — ASSESSMENT & PLAN NOTE
Hgb drop from 8 to 6.1 on 7/19, improved to 7.1 after 1 unit, was unable to tolerate further transfusions due to increasing oxygen requirement, so she was given lasix, planning for second transfusion to reach goal >8 due to recent ACS on 8/22. The patient does not have a history of cirrhosis.    Plan  - 2 large bore IVs  - Type and screen  - Hgb goal >8 due recent ACS  - Correct coagulopathy (goal plt >50, INR <1.5) if present in patients without absolute contraindications.  - Hold lovenox but continue DAPT due to recent STEMI  - CBC Q12H  - s/p 2 units PRBC  - GI consulted, no endoscopy recommended at this time  -  S/p 80mg IV pantoprazole  - Pantoprazole 40mg IV BID

## 2023-08-22 NOTE — PROVATION PATIENT INSTRUCTIONS
Discharge Summary/Instructions after an Endoscopic Procedure  Patient Name: Mariann Huff  Patient MRN: [id number]  Patient YOB: 1960  Tuesday, August 22, 2023  Christian Huff MD  Dear patient,  As a result of recent federal legislation (The Federal Cures Act), you may   receive lab or pathology results from your procedure in your [facility]   account before your physician is able to contact you. Your physician or   their representative will relay the results to you with their   recommendations at their soonest availability.  Thank you,  RESTRICTIONS:  During your procedure today, you received medications for sedation.  These   medications may affect your judgment, balance and coordination.  Therefore,   for 24 hours, you have the following restrictions:   - DO NOT drive a car, operate machinery, make legal/financial decisions,   sign important papers or drink alcohol.    ACTIVITY:  Today: no heavy lifting, straining or running due to procedural   sedation/anesthesia.  The following day: return to full activity including work.  DIET:  Eat and drink normally unless instructed otherwise.     TREATMENT FOR COMMON SIDE EFFECTS:  - Mild abdominal pain, nausea, belching, bloating or excessive gas:  rest,   eat lightly and use a heating pad.  - Sore Throat: treat with throat lozenges and/or gargle with warm salt   water.  - Because air was used during the procedure, expelling large amounts of air   from your rectum or belching is normal.  - If a bowel prep was taken, you may not have a bowel movement for 1-3 days.    This is normal.  SYMPTOMS TO WATCH FOR AND REPORT TO YOUR PHYSICIAN:  1. Abdominal pain or bloating, other than gas cramps.  2. Chest pain.  3. Back pain.  4. Signs of infection such as: chills or fever occurring within 24 hours   after the procedure.  5. Rectal bleeding, which would show as bright red, maroon, or black stools.   (A tablespoon of blood from the rectum is not serious, especially if    hemorrhoids are present.)  6. Vomiting.  7. Weakness or dizziness.  GO DIRECTLY TO THE NEAREST EMERGENCY ROOM IF YOU HAVE ANY OF THE FOLLOWING:      Difficulty breathing              Chills and/or fever over 101 F   Persistent vomiting and/or vomiting blood   Severe abdominal pain   Severe chest pain   Black, tarry stools   Bleeding- more than one tablespoon   Any other symptom or condition that you feel may need urgent attention  Your doctor recommends these additional instructions:  If any biopsies were taken, your doctors clinic will contact you in 1 to 2   weeks with any results.  - [facility].   - Resume previous diet.   - Continue present medications.   - Return to primary care physician as previously scheduled.  For questions, problems or results please call your physician - Christian Huff MD at Work:  [phone].  [facility], EMERGENCY ROOM PHONE NUMBER: [phone]  IF A COMPLICATION OR EMERGENCY SITUATION ARISES AND YOU ARE UNABLE TO REACH   YOUR PHYSICIAN - GO DIRECTLY TO THE EMERGENCY ROOM.  Christian Huff MD  8/22/2023 4:06:16 PM  This report has been verified and signed electronically.  Dear patient,  As a result of recent federal legislation (The Federal Cures Act), you may   receive lab or pathology results from your procedure in your [facility]   account before your physician is able to contact you. Your physician or   their representative will relay the results to you with their   recommendations at their soonest availability.  Thank you,  PROVATION

## 2023-08-22 NOTE — SUBJECTIVE & OBJECTIVE
Subjective:     Interval History:   - Tbili up to 6.1 today  - LFTs trending down  - NPO since midnight    Review of Systems   Constitutional:  Negative for activity change, appetite change, chills and fever.   HENT:  Negative for trouble swallowing.    Respiratory:  Negative for shortness of breath.    Cardiovascular:  Negative for chest pain.   Gastrointestinal:  Negative for abdominal pain, constipation, diarrhea, nausea and vomiting.   Skin:  Positive for color change.     Objective:     Vital Signs (Most Recent):  Temp: 97.8 °F (36.6 °C) (08/22/23 0756)  Pulse: 89 (08/22/23 0756)  Resp: 18 (08/22/23 0756)  BP: (!) 145/74 (08/22/23 0756)  SpO2: 100 % (08/22/23 0756) Vital Signs (24h Range):  Temp:  [97.8 °F (36.6 °C)-99.1 °F (37.3 °C)] 97.8 °F (36.6 °C)  Pulse:  [84-97] 89  Resp:  [17-20] 18  SpO2:  [92 %-100 %] 100 %  BP: (118-176)/(60-85) 145/74     Weight: 59.6 kg (131 lb 6.3 oz) (08/21/23 0800)  Body mass index is 22.55 kg/m².      Intake/Output Summary (Last 24 hours) at 8/22/2023 1007  Last data filed at 8/21/2023 1823  Gross per 24 hour   Intake 980 ml   Output 500 ml   Net 480 ml       Lines/Drains/Airways       Peripheral Intravenous Line  Duration                  Peripheral IV - Single Lumen 08/16/23 0934 20 G;1 3/4 in Left Upper Arm 6 days         Peripheral IV - Single Lumen 08/19/23 1440 18 G;2 in Anterior;Right Upper Arm 2 days                     Physical Exam  Vitals and nursing note reviewed.   Constitutional:       General: She is not in acute distress.     Appearance: She is ill-appearing.   Cardiovascular:      Rate and Rhythm: Normal rate and regular rhythm.   Pulmonary:      Effort: Pulmonary effort is normal. No respiratory distress.      Breath sounds: Normal breath sounds.   Abdominal:      General: Bowel sounds are normal. There is no distension.      Palpations: Abdomen is soft.      Tenderness: There is no abdominal tenderness.   Skin:     General: Skin is warm and dry.       Capillary Refill: Capillary refill takes 2 to 3 seconds.      Coloration: Skin is jaundiced.   Neurological:      Mental Status: She is alert and oriented to person, place, and time.          Significant Labs:  CBC:   Recent Labs   Lab 08/21/23 0613 08/21/23 2037 08/22/23 0513   WBC 10.74 11.25 10.52   HGB 7.6* 8.8* 8.8*   HCT 24.3* 27.7* 27.7*    272 269     CMP:   Recent Labs   Lab 08/22/23 0513   *   CALCIUM 8.2*   ALBUMIN 1.4*   PROT 6.4      K 4.0   CO2 24      BUN 9   CREATININE 0.7   ALKPHOS 653*   ALT 80*   *   BILITOT 6.1*     All pertinent lab results from the last 24 hours have been reviewed.      Significant Imaging:  Imaging results within the past 24 hours have been reviewed.

## 2023-08-22 NOTE — TREATMENT PLAN
Post-Procedure Gastroenterology Treatment Plan:     Mariann Huff is status post ERCP with the following findings:     - One visibly occluded stent from the biliary tree was seen in the major papilla.   - A single localized biliary stricture was found in the middle third of the main bile duct. The stricture was indeterminate.   - The upper third of the main bile duct was dilated, secondary to a stricture.   - One covered metal stent was placed into the common bile duct.     Our recommendations are as follows:     - Okay to resume previous diet.   - CMP daily.         Vijay Bonilla MD, PGY-VI  Gastroenterology Fellow  [facility]

## 2023-08-22 NOTE — PROGRESS NOTES
[facility] - [facility]down  [facility] - Gastroenterology  Progress Note    Patient Name: Mariann Huff  MRN: [id number]  Admission Date: 8/14/2023  Hospital Length of Stay: 8 days  Code Status: Full Code   Attending Provider: Baudilio Lozada MD  Consulting Provider: CARLYLE LINDSEY NP  Primary Care Physician: Jasbir Haney MD  Principal Problem: Biliary obstruction    HPI:  Ms. Mariann Huff is a 63 year old female for whom [facility] is consulted for evaluation of hyperbilirubinemia. She has a PMH significant for CAD (status post PCI in 2014), DVT (on Apixaban), MURTAZA, pancreatitis (diagnosed 05/2023; attributed to TRULICITY usage initially; status post EUS/ERCP in 06/2023 showing gallbladder sludge and stricture in lower third of main duct suspected to be from pancreatitis with plastic stent placed into CBD; due for removal in 08/2023), peripheral artery disease (on PLETAL), and T2DM. She has a PSH significant for subtotal cholecystectomy (late 06/2023).      Hospital Course:   Patient was admitted to [facility] on 08/14 for management of posterior STEMI. She was taken to cath lab on admission and underwent PCI with three GINGER placement and initiated on PLAVIX. On 08/16, patient noted to develop worsening hyperbilirubinemia since admission (2 from 1.1), mildly elevated lipase (61), and transaminitis (, ALT 50's) associated with abdominal pain. CT A/P obtained on 08/16 showed multiple hepatic lesions, biliary stent in place with pneumobilia, evidence of prior cholecystectomy, and a mass measuring 4.7 X 3.3 cm at the duodenal/pancreatic head. [facility] consulted for further evaluation.      On initial bedside encounter, patient reports sudden onset of bilateral upper abdominal pain on 08/16. She reports symptom association with daily non-bloody emesis since approximately 05/2023 and approximately 30 pounds of unintentional weight loss and eye yellowing since spring of 2023. She denies symptom association with melena,  hematochezia, pale colored stools, family history of gastric, colon, and pancreatic cancer, and use of ETOH or cigarettes.         Subjective:     Interval History:   - Tbili up to 6.1 today  - LFTs trending down  - NPO since midnight    Review of Systems   Constitutional:  Negative for activity change, appetite change, chills and fever.   HENT:  Negative for trouble swallowing.    Respiratory:  Negative for shortness of breath.    Cardiovascular:  Negative for chest pain.   Gastrointestinal:  Negative for abdominal pain, constipation, diarrhea, nausea and vomiting.   Skin:  Positive for color change.     Objective:     Vital Signs (Most Recent):  Temp: 97.8 °F (36.6 °C) (08/22/23 0756)  Pulse: 89 (08/22/23 0756)  Resp: 18 (08/22/23 0756)  BP: (!) 145/74 (08/22/23 0756)  SpO2: 100 % (08/22/23 0756) Vital Signs (24h Range):  Temp:  [97.8 °F (36.6 °C)-99.1 °F (37.3 °C)] 97.8 °F (36.6 °C)  Pulse:  [84-97] 89  Resp:  [17-20] 18  SpO2:  [92 %-100 %] 100 %  BP: (118-176)/(60-85) 145/74     Weight: 59.6 kg (131 lb 6.3 oz) (08/21/23 0800)  Body mass index is 22.55 kg/m².      Intake/Output Summary (Last 24 hours) at 8/22/2023 1007  Last data filed at 8/21/2023 1823  Gross per 24 hour   Intake 980 ml   Output 500 ml   Net 480 ml       Lines/Drains/Airways       Peripheral Intravenous Line  Duration                  Peripheral IV - Single Lumen 08/16/23 0934 20 G;1 3/4 in Left Upper Arm 6 days         Peripheral IV - Single Lumen 08/19/23 1440 18 G;2 in Anterior;Right Upper Arm 2 days                     Physical Exam  Vitals and nursing note reviewed.   Constitutional:       General: She is not in acute distress.     Appearance: She is ill-appearing.   Cardiovascular:      Rate and Rhythm: Normal rate and regular rhythm.   Pulmonary:      Effort: Pulmonary effort is normal. No respiratory distress.      Breath sounds: Normal breath sounds.   Abdominal:      General: Bowel sounds are normal. There is no distension.       Palpations: Abdomen is soft.      Tenderness: There is no abdominal tenderness.   Skin:     General: Skin is warm and dry.      Capillary Refill: Capillary refill takes 2 to 3 seconds.      Coloration: Skin is jaundiced.   Neurological:      Mental Status: She is alert and oriented to person, place, and time.          Significant Labs:  CBC:   Recent Labs   Lab 08/21/23 0613 08/21/23 2037 08/22/23  0513   WBC 10.74 11.25 10.52   HGB 7.6* 8.8* 8.8*   HCT 24.3* 27.7* 27.7*    272 269     CMP:   Recent Labs   Lab 08/22/23 0513   *   CALCIUM 8.2*   ALBUMIN 1.4*   PROT 6.4      K 4.0   CO2 24      BUN 9   CREATININE 0.7   ALKPHOS 653*   ALT 80*   *   BILITOT 6.1*     All pertinent lab results from the last 24 hours have been reviewed.      Significant Imaging:  Imaging results within the past 24 hours have been reviewed.    Assessment/Plan:     GI  * Biliary obstruction  See hyperbilirubinemia     Hyperbilirubinemia  This is a 63 year old female with a PMH significant for CAD (status post PCI in 2014), DVT (on Apixaban), MURTAZA, pancreatitis (diagnosed 05/2023; attributed to TRULICITY usage initially; status post EUS/ERCP in 06/2023 showing gallbladder sludge and stricture in lower third of main duct suspected to be from pancreatitis with plastic stent placed into CBD; due for removal in 08/2023), peripheral artery disease (on PLETAL), and T2DM and a PSH significant for subtotal cholecystectomy (late 06/2023) who was admitted to [facility] on 08/14 for posterior STEMI requiring PCI on admission and initiation of ASA and PLAVIX. Hospital course associated with onset of abdominal pain and progressive hyperbilirubinemia since 08/16 with CT A/P non-contrast from that time concerning for pancreatic head mass with multiple hepatic lesions concerning for underlying pancreatic cancer. She had a biliary stricture treated with biliary sphincterotomy, biliary stent, and cholecystectomy s/p ERCP w/  metal stent on 8/17, abdominal pain has resolved but T bili is continuing to rise. Abdominal x-ray completed yesterday which did not show migration of the stent but today Tbili has risen again and thus we will repeat her ERCP to reassess.    Recommendations:     - Keep NPO  - ERCP today to reassess stent  - CMP Daily - monitor Tbili and LFTs        Thank you for your consult. I will follow-up with patient. Please contact us if you have any additional questions.    CARLYLE LINDSEY NP  [facility] - Gastroenterology  [facility] - Cardiology Stepdown

## 2023-08-22 NOTE — PROGRESS NOTES
[facility] - Cardiology [facility] Medicine  Progress Note    Patient Name: Mariann Huff  MRN: [id number]  Patient Class: IP- Inpatient   Admission Date: 8/14/2023  Length of Stay: 8 days  Attending Physician: Baudilio Lozada MD  Primary Care Provider: Jasbir Haney MD        Subjective:     Principal Problem:Biliary obstruction        HPI:  63 y.o. female with CAD s/p GINGER to ostial LAD in 2014, HTN, HLD, DM2, MURTAZA who presented to the ED to the ER after a near syncopal event at home around 4 pm. She also feels tired and her appetite lately has decreased. she had chest pain and visceral symptoms with nausea and vomiting. She reports losing weight as well.She has had abdominal symptoms with poor PO intake leading to orthostasis. Today she had another near syncopal event and is why she was brought to the ER. Here her ECG showed the posterior changes for which posterior EKG was obtained with showed STEMI. Bedside echo showed an EF 45-50% and norma-lateral and infero-lateral hypokinesis. She was taken to cath lab and her symptoms resolved after PCI. Please refer to PCI for full details.       Overview/Hospital Course:  Patient was taken to cath lab and her symptoms resolved after PCI. Patient chest pain resolved.     Patient reported development of acute abdominal pain associated with nausea and vomiting. Patient was recently admitted (6/23) for suspected biliary pancreatitis and biliary stricture treated with biliary sphincterotomy, biliary stent, and cholecystectomy.    Patient now has worsening transaminitis w/ elevated T-Bili.   Concern for stent occlusion. Patient amylase 107 wnl, and Lipase 61. Workup will include total abdominal ultrasound and [facility] was consulted . Was scheduled for outpatient w/u of hepatic lesions found on previous admission 8/2/23.  Patient had ERCP and EUS on 8/17/23. Visualized a partial occluded stent in the biliary tree. One covered metal stent was placed  into the common bile duct. Concern for metastatic cancer is high due to multiple liver lesions and mass on pancreatic head per [facility]. Initially on previous hospital admission, it was believed to more likely be liver abscesses rather than malignancy due to acute nature of progression. S/p FNA biopsy with ERCP and stent placement on 8/17.     Became hypoglycemic on 8/19 and Hgb dropped from 8 to 6.3 with a reported dark stool, Improved to 7.1 after 1 unit PRBC, could not get a second unit due to respiratory distress, no further episodes of melena. Seen by GI, did not recommend endoscopy at this time for possible GIB.  Given lasix 20mg IV then 40mg IV with good output and improvement in respiratory status.    T bili continued to rise on 8/21, KUB did not show stent migration, planning for ERCP 8/22. Hgb stable at 7.6, will aim for >8, given lasix 40mg IV with 1 unit PRBC on 8/21.       Interval History: NAEON, t bili rising, possible ERCP 8/22    Review of Systems   Constitutional:  Positive for fatigue.   Respiratory:  Negative for shortness of breath.    Cardiovascular:  Negative for chest pain.   Gastrointestinal:  Negative for abdominal pain.   Genitourinary:  Negative for dysuria and frequency.   Neurological:  Negative for dizziness and headaches.     Objective:     Vital Signs (Most Recent):  Temp: 98.2 °F (36.8 °C) (08/22/23 1136)  Pulse: 88 (08/22/23 1136)  Resp: 18 (08/22/23 1136)  BP: (!) 128/56 (08/22/23 1136)  SpO2: (!) 93 % (08/22/23 1136) Vital Signs (24h Range):  Temp:  [97.8 °F (36.6 °C)-99.1 °F (37.3 °C)] 98.2 °F (36.8 °C)  Pulse:  [81-94] 88  Resp:  [17-19] 18  SpO2:  [92 %-100 %] 93 %  BP: (128-176)/(56-85) 128/56     Weight: 58.6 kg (129 lb 3 oz)  Body mass index is 22.18 kg/m².    Intake/Output Summary (Last 24 hours) at 8/22/2023 1350  Last data filed at 8/21/2023 1823  Gross per 24 hour   Intake 740 ml   Output --   Net 740 ml           Physical Exam  Constitutional:       Appearance: She is  "ill-appearing.   HENT:      Head: Normocephalic and atraumatic.   Cardiovascular:      Rate and Rhythm: Normal rate and regular rhythm.      Pulses: Normal pulses.      Heart sounds: Normal heart sounds.   Pulmonary:      Effort: Pulmonary effort is normal.      Comments: Crackles in all lung fields  Abdominal:      General: Abdomen is flat.      Tenderness: There is no abdominal tenderness.   Musculoskeletal:      Right lower leg: No edema.      Left lower leg: No edema.   Skin:     General: Skin is warm.      Capillary Refill: Capillary refill takes less than 2 seconds.      Coloration: Skin is not jaundiced or pale.   Neurological:      General: No focal deficit present.      Mental Status: She is alert and oriented to person, place, and time.   Psychiatric:         Mood and Affect: Mood normal.             Significant Labs: All pertinent labs within the past 24 hours have been reviewed.  BMP:   Recent Labs   Lab 08/22/23  0513   *      K 4.0      CO2 24   BUN 9   CREATININE 0.7   CALCIUM 8.2*   MG 2.1       CBC:   Recent Labs   Lab 08/21/23  0613 08/21/23 2037 08/22/23  0513   WBC 10.74 11.25 10.52   HGB 7.6* 8.8* 8.8*   HCT 24.3* 27.7* 27.7*    272 269       CMP:   Recent Labs   Lab 08/21/23  0613 08/22/23  0513    138   K 3.4* 4.0    105   CO2 26 24    148*   BUN 9 9   CREATININE 0.8 0.7   CALCIUM 8.0* 8.2*   PROT 6.3 6.4   ALBUMIN 1.5* 1.4*   BILITOT 5.0* 6.1*   ALKPHOS 670* 653*   * 158*   ALT 80* 80*   ANIONGAP 9 9       Lactic Acid: No results for input(s): "LACTATE" in the last 48 hours.  Troponin: No results for input(s): "TROPONINI", "TROPONINIHS" in the last 48 hours.  Urine Culture: No results for input(s): "LABURIN" in the last 48 hours.  Urine Studies: No results for input(s): "COLORU", "APPEARANCEUA", "PHUR", "SPECGRAV", "PROTEINUA", "GLUCUA", "KETONESU", "BILIRUBINUA", "OCCULTUA", "NITRITE", "UROBILINOGEN", "LEUKOCYTESUR", "RBCUA", "WBCUA", " ""BACTERIA", "SQUAMEPITHEL", "HYALINECASTS" in the last 48 hours.    Invalid input(s): "WRIGHTSUR"    Significant Imaging: I have reviewed all pertinent imaging results/findings within the past 24 hours.      Assessment/Plan:      * Biliary obstruction  Patient reports development of acute abdominal pain associated with nausea and vomiting. Patient was recently admitted (6/23) for suspected biliary pancreatitis and biliary stricture treated with biliary sphincterotomy, biliary stent, and cholecystectomy s/p ERCP w/ metal stent on 8/17, abdominal pain has resolved but T bili is still rising     - Trend T bili daily  -AES consulted, s/p ERCP w/ biopsy on 8/17, transaminases and alk phos improving but bili is worsening  -Possible ERCP 8/22  - NPO for possible proceudre  - No elevated WBC or signs of infection, hold off on abx        Gastrointestinal hemorrhage with melena  Hgb drop from 8 to 6.1 on 7/19, improved to 7.1 after 1 unit, was unable to tolerate further transfusions due to increasing oxygen requirement, so she was given lasix, planning for second transfusion to reach goal >8 due to recent ACS on 8/22. The patient does not have a history of cirrhosis.    Plan  - 2 large bore IVs  - Type and screen  - Hgb goal >8 due recent ACS  - Correct coagulopathy (goal plt >50, INR <1.5) if present in patients without absolute contraindications.  - Hold lovenox but continue DAPT due to recent STEMI  - CBC Q12H  - s/p 2 units PRBC  - GI consulted, no endoscopy recommended at this time  -  S/p 80mg IV pantoprazole  - Pantoprazole 40mg IV BID      Atrial fibrillation  Patient with Paroxysmal (<7 days) atrial fibrillation which is controlled currently with Beta Blocker. Patient is currently in sinus rhythm.[id number] Score: 3. . Anticoagulation not indicated due to possible bleeding, was previously on lovenox but it is being held.    Abdominal pain  See biliary obstruction, pain is stable      CHF (congestive heart " failure)  -Last week had CXR with B/L edema, , EF decreased to 40-45%  -Got IV lasix while inpatient and was discharged on 40 po daily however became dehydrated and weak and LVEDP -in the lab showed LVEDP 9, reduce dose to 20 po  - Continue losartan, jarad, and metoprolol, resume SGLT at dc  - Lasix 40mg IV daily for volume overload from transfusion    TTE 8/15/23    Left Ventricle: The left ventricle is normal in size. Ventricular mass is normal. Normal wall thickness. regional wall motion abnormalities present. See diagram for wall motion findings. There is moderately reduced systolic function. Ejection fraction by visual approximation is 40%. Grade II diastolic dysfunction.    Left Atrium: Left atrium is mildly dilated. The left atrium volume index is 35.5 mL/m2.    Right Ventricle: Normal right ventricular cavity size. Wall thickness is normal. Right ventricle wall motion  is normal. Systolic function is normal.    Mitral Valve: There is mild regurgitation.    IVC/SVC: Normal venous pressure at 3 mmHg.    Liver parenchyma is inhomgenous.  Clinical correlation is required.      STEMI (ST elevation myocardial infarction)  -Started having near syncope around 4 pm on the day of admission  -Bedside echo in the ER showed an EF 45-50% and norma-lateral and infero-lateral hypokinesis  -She underwent Successful primary PCI of ramus intermedius and lateral branch with Pronto thrombectomy and 3X16 GINGER     Plan  - Continue aspirin 81 mg daily, stop after 1 month to avoid triple therapy  - Loaded with brilinta 180, loaded with plavix 600 8/15/23, and 75 qd there after  - Continue high intensity statin therapy (LDL goal < 70)  - TTE shows ejection fraction of about 40% associated with grade II diastolic dysfunction.  - Hold lasix for now  - Continue ASA/plavix      Liver lesion  Noted on prior admissions c/f metastatic disease with pancreatic primary, biopsy done 8/17, pending results      Other specified  anemias  See GIB above      History of pancreatitis  Recent (6/2023) suspected biliary pancreatitis and biliary stricture treated with biliary sphincterotomy, biliary stent, and cholecystectomy.  CT A/P with new innumerable hepatic lesions:  1. Innumerable low attenuating hepatic lesions, new since the previous exam.  Malignancy remains a concern however given short-term development, correlation is needed to exclude multifocal infection/abscess in this patient status post bile duct stent placement and cholecystectomy.  Please note, there is a low attenuating focus within the gallbladder fossa, similar to the foci throughout the hepatic parenchyma..  2. Pancreatic ductal dilatation up to 4 mm, minimally increased compared to prior.  There is fullness of the pancreatic head, underlying mass is not excluded, correlation with recent ERCP advised.  3. Simple and complex bilateral renal cysts.  4. Biliary stent in place with expected pneumobilia.  5. Cholecystectomy with scattered fluid about the gallbladder fossa.  6. Moderate stool throughout the colon, may reflect developing constipation.  7.  Additional findings as above.     - concern for lesions seen on CT A/P  - Patient was found to have a hypoechoic pancreatic mass on abdominal US  - Was also found to have this mass on EUS concerning for malignancy   - EUS and ERCP 8/17/23, found to have a partially occluded stent. Bare metal stent placed in CBD  -  Multiple lesions seen on previous admit have been visualized. Per [facility] more concerning for metastatic cancer  - FNA biopsy of lesions, F/U with cytology results   - Advancing diet to regular diet  - Stopping abx    Type 2 diabetes mellitus with stage 2 chronic kidney disease and hypertension  Home Antihyperglycemic Regiment:  -Farxiga, glipizide, insulin daily at home  - Titrate and addition of insulin as needed for BG goal 140-180  - SSI with POCT accuchecks ACHS and Diabetic diet 2000 beti  - Diabetic nutritional  counseling given   - Continue home gabapentin for diabetic peripheral neuropathy  - Appreciate Endocrinology recs  - Adjust insulin and monitor closely  - Endocrine following      VTE Risk Mitigation (From admission, onward)    None          Discharge Planning   REBECCA: 8/25/2023     Code Status: Full Code   Is the patient medically ready for discharge?: No    Reason for patient still in hospital (select all that apply): Patient trending condition and Treatment  Discharge Plan A: Home Health                  Baudilio Lozada MD  Department of Hospital Medicine   [facility] - Cardiology Stepdown

## 2023-08-22 NOTE — ASSESSMENT & PLAN NOTE
-Started having near syncope around 4 pm on the day of admission  -Bedside echo in the ER showed an EF 45-50% and norma-lateral and infero-lateral hypokinesis  -She underwent Successful primary PCI of ramus intermedius and lateral branch with Pronto thrombectomy and 3X16 GINGER     Plan  - Continue aspirin 81 mg daily, stop after 1 month to avoid triple therapy  - Loaded with brilinta 180, loaded with plavix 600 8/15/23, and 75 qd there after  - Continue high intensity statin therapy (LDL goal < 70)  - TTE shows ejection fraction of about 40% associated with grade II diastolic dysfunction.  - Hold lasix for now  - Continue ASA/plavix

## 2023-08-22 NOTE — NURSING TRANSFER
Nursing Transfer Note      8/22/2023   5:08 PM  Reason patient is being transferred: post-procedure    Transfer To: [id number]    Transfer via stretcher    Transfer with cardiac monitoring    Transported by PCT    Telemetry: Rhythm NSR  Order for Tele Monitor? Yes  Medicines sent: none    Any special needs or follow-up needed: routine    Patient belongings transferred with patient: No    Chart send with patient: Yes    Notified: spouse

## 2023-08-22 NOTE — ASSESSMENT & PLAN NOTE
This is a 63 year old female with a PMH significant for CAD (status post PCI in 2014), DVT (on Apixaban), MURTAZA, pancreatitis (diagnosed 05/2023; attributed to TRULICITY usage initially; status post EUS/ERCP in 06/2023 showing gallbladder sludge and stricture in lower third of main duct suspected to be from pancreatitis with plastic stent placed into CBD; due for removal in 08/2023), peripheral artery disease (on PLETAL), and T2DM and a PSH significant for subtotal cholecystectomy (late 06/2023) who was admitted to [facility] on 08/14 for posterior STEMI requiring PCI on admission and initiation of ASA and PLAVIX. Hospital course associated with onset of abdominal pain and progressive hyperbilirubinemia since 08/16 with CT A/P non-contrast from that time concerning for pancreatic head mass with multiple hepatic lesions concerning for underlying pancreatic cancer. She had a biliary stricture treated with biliary sphincterotomy, biliary stent, and cholecystectomy s/p ERCP w/ metal stent on 8/17, abdominal pain has resolved but T bili is continuing to rise. Abdominal x-ray completed yesterday which did not show migration of the stent but today Tbili has risen again and thus we will repeat her ERCP to reassess.    Recommendations:     - Keep NPO  - ERCP today to reassess stent  - CMP Daily - monitor Tbili and LFTs

## 2023-08-22 NOTE — SUBJECTIVE & OBJECTIVE
Interval History: lanie GUILLEN rising, possible ERCP 8/22    Review of Systems   Constitutional:  Positive for fatigue.   Respiratory:  Negative for shortness of breath.    Cardiovascular:  Negative for chest pain.   Gastrointestinal:  Negative for abdominal pain.   Genitourinary:  Negative for dysuria and frequency.   Neurological:  Negative for dizziness and headaches.     Objective:     Vital Signs (Most Recent):  Temp: 98.2 °F (36.8 °C) (08/22/23 1136)  Pulse: 88 (08/22/23 1136)  Resp: 18 (08/22/23 1136)  BP: (!) 128/56 (08/22/23 1136)  SpO2: (!) 93 % (08/22/23 1136) Vital Signs (24h Range):  Temp:  [97.8 °F (36.6 °C)-99.1 °F (37.3 °C)] 98.2 °F (36.8 °C)  Pulse:  [81-94] 88  Resp:  [17-19] 18  SpO2:  [92 %-100 %] 93 %  BP: (128-176)/(56-85) 128/56     Weight: 58.6 kg (129 lb 3 oz)  Body mass index is 22.18 kg/m².    Intake/Output Summary (Last 24 hours) at 8/22/2023 1350  Last data filed at 8/21/2023 1823  Gross per 24 hour   Intake 740 ml   Output --   Net 740 ml           Physical Exam  Constitutional:       Appearance: She is ill-appearing.   HENT:      Head: Normocephalic and atraumatic.   Cardiovascular:      Rate and Rhythm: Normal rate and regular rhythm.      Pulses: Normal pulses.      Heart sounds: Normal heart sounds.   Pulmonary:      Effort: Pulmonary effort is normal.      Comments: Crackles in all lung fields  Abdominal:      General: Abdomen is flat.      Tenderness: There is no abdominal tenderness.   Musculoskeletal:      Right lower leg: No edema.      Left lower leg: No edema.   Skin:     General: Skin is warm.      Capillary Refill: Capillary refill takes less than 2 seconds.      Coloration: Skin is not jaundiced or pale.   Neurological:      General: No focal deficit present.      Mental Status: She is alert and oriented to person, place, and time.   Psychiatric:         Mood and Affect: Mood normal.             Significant Labs: All pertinent labs within the past 24 hours have been  "reviewed.  BMP:   Recent Labs   Lab 08/22/23  0513   *      K 4.0      CO2 24   BUN 9   CREATININE 0.7   CALCIUM 8.2*   MG 2.1       CBC:   Recent Labs   Lab 08/21/23  0613 08/21/23 2037 08/22/23  0513   WBC 10.74 11.25 10.52   HGB 7.6* 8.8* 8.8*   HCT 24.3* 27.7* 27.7*    272 269       CMP:   Recent Labs   Lab 08/21/23 0613 08/22/23  0513    138   K 3.4* 4.0    105   CO2 26 24    148*   BUN 9 9   CREATININE 0.8 0.7   CALCIUM 8.0* 8.2*   PROT 6.3 6.4   ALBUMIN 1.5* 1.4*   BILITOT 5.0* 6.1*   ALKPHOS 670* 653*   * 158*   ALT 80* 80*   ANIONGAP 9 9       Lactic Acid: No results for input(s): "LACTATE" in the last 48 hours.  Troponin: No results for input(s): "TROPONINI", "TROPONINIHS" in the last 48 hours.  Urine Culture: No results for input(s): "LABURIN" in the last 48 hours.  Urine Studies: No results for input(s): "COLORU", "APPEARANCEUA", "PHUR", "SPECGRAV", "PROTEINUA", "GLUCUA", "KETONESU", "BILIRUBINUA", "OCCULTUA", "NITRITE", "UROBILINOGEN", "LEUKOCYTESUR", "RBCUA", "WBCUA", "BACTERIA", "SQUAMEPITHEL", "HYALINECASTS" in the last 48 hours.    Invalid input(s): "WRIGHTSUR"    Significant Imaging: I have reviewed all pertinent imaging results/findings within the past 24 hours.  "

## 2023-08-22 NOTE — PLAN OF CARE
Problem: Diabetes Comorbidity  Goal: Blood Glucose Level Within Targeted Range  Outcome: Ongoing, Progressing  Intervention: Monitor and Manage Glycemia  Flowsheets (Taken 8/22/2023 1820)  Glycemic Management: blood glucose monitored     Problem: Fluid and Electrolyte Imbalance (Acute Kidney Injury/Impairment)  Goal: Fluid and Electrolyte Balance  Outcome: Ongoing, Progressing  Intervention: Monitor and Manage Fluid and Electrolyte Balance  Flowsheets (Taken 8/22/2023 1820)  Fluid/Electrolyte Management: fluids provided     Problem: Oral Intake Inadequate (Acute Kidney Injury/Impairment)  Goal: Optimal Nutrition Intake  Outcome: Ongoing, Progressing  Intervention: Promote and Optimize Nutrition  Flowsheets (Taken 8/22/2023 1820)  Oral Nutrition Promotion: rest periods promoted     Problem: Renal Function Impairment (Acute Kidney Injury/Impairment)  Goal: Effective Renal Function  Outcome: Ongoing, Progressing  Intervention: Monitor and Support Renal Function  Flowsheets (Taken 8/22/2023 1820)  Medication Review/Management: medications reviewed     Problem: Adult Inpatient Plan of Care  Goal: Absence of Hospital-Acquired Illness or Injury  Outcome: Ongoing, Progressing  Intervention: Identify and Manage Fall Risk  Flowsheets (Taken 8/22/2023 1820)  Safety Promotion/Fall Prevention:   assistive device/personal item within reach   bed alarm set   Fall Risk reviewed with patient/family   family to remain at bedside   Fall Risk signage in place   nonskid shoes/socks when out of bed   side rails raised x 2  Intervention: Prevent Skin Injury  Flowsheets (Taken 8/22/2023 1820)  Body Position: position changed independently  Skin Protection:   adhesive use limited   tubing/devices free from skin contact  Intervention: Prevent and Manage VTE (Venous Thromboembolism) Risk  Flowsheets (Taken 8/22/2023 1820)  Activity Management: Rolling - L1  VTE Prevention/Management: ambulation promoted  Range of Motion: active ROM (range  of motion) encouraged  Intervention: Prevent Infection  Flowsheets (Taken 8/22/2023 1820)  Infection Prevention: hand hygiene promoted

## 2023-08-22 NOTE — ASSESSMENT & PLAN NOTE
Patient reports development of acute abdominal pain associated with nausea and vomiting. Patient was recently admitted (6/23) for suspected biliary pancreatitis and biliary stricture treated with biliary sphincterotomy, biliary stent, and cholecystectomy s/p ERCP w/ metal stent on 8/17, abdominal pain has resolved but T bili is still rising     - Trend T bili daily  -[facility] consulted, s/p ERCP w/ biopsy on 8/17, transaminases and alk phos improving but bili is worsening  -Possible ERCP 8/22  - NPO for possible proceudre  - No elevated WBC or signs of infection, hold off on abx

## 2023-08-22 NOTE — ANESTHESIA PREPROCEDURE EVALUATION
[facility]  Anesthesia Pre-Operative Evaluation         Patient Name: Mariann Huff  YOB: 1960  MRN: [id number]    SUBJECTIVE:     Pre-operative evaluation for Procedure(s) (LRB):  ERCP (ENDOSCOPIC RETROGRADE CHOLANGIOPANCREATOGRAPHY) (N/A)     08/22/2023    Mariann Huff is a 63 y.o. female w/ a significant PMHx of CAD s/p GINGER to ostial LAD in 2014, HTN, HLD, DM2, MURTAZA who was admitted for syncope, chest pain, N/V and abdominal pain. EKG significant for STEMI with posterior changes . Pt is s/p PCI on 8/14/2023, which resolved her CP. Patient is s/p ERCP last week, denies any further N/V.    PAPER CONSENT IN CHART     [facility] 8/3/23 (For NSTEMI with Trop up to 29)    The Ramus lesion was 70% stenosed.    The Ost Cx to Prox Cx lesion was 99% stenosed.    The ejection fraction was calculated to be 45%.    The pre-procedure left ventricular end diastolic pressure was 15.        The estimated blood loss was none.    Patient now presents for the above procedure(s).    Echo Summary 8/14/23:    Interpretation Summary    Left Ventricle: The left ventricle is normal in size. Ventricular mass is normal. Normal wall thickness. regional wall motion abnormalities present. See diagram for wall motion findings. There is moderately reduced systolic function. Ejection fraction by visual approximation is 40%. Grade II diastolic dysfunction.    Left Atrium: Left atrium is mildly dilated. The left atrium volume index is 35.5 mL/m2.    Right Ventricle: Normal right ventricular cavity size. Wall thickness is normal. Right ventricle wall motion  is normal. Systolic function is normal.    Mitral Valve: There is mild regurgitation.    IVC/SVC: Normal venous pressure at 3 mmHg.    Liver parenchyma is inhomgenous.  Clinical correlation is required.       Prev airway 7/12/23:    Induction:  Inhalational - mask    Intubated:  Postinduction    Mask Ventilation:  Easy mask    Attempts:   1    Attempted By:  CRNA    Method of Intubation:  Video laryngoscopy    Blade:  Shahid 2    Laryngeal View Grade: Grade I - full view of cords      Difficult Airway Encountered?: No      Complications:  None    Airway Device:  Oral endotracheal tube    Airway Device Size:  7.5    Style/Cuff Inflation:  Cuffed    Inflation Amount (mL):  6    Tube secured:  21    Secured at:  The lips    Placement Verified By:  Colorimetric ETCO2 device    Complicating Factors:  None    LDA:        Peripheral IV - Single Lumen 08/14/23 1941 18 G Right Antecubital (Active)   Site Assessment Clean;Dry;Intact;No redness;No swelling 08/16/23 0400   Extremity Assessment Distal to IV No abnormal discoloration 08/15/23 2100   Line Status Flushed;Saline locked 08/16/23 0400   Dressing Status Clean;Dry;Intact 08/16/23 0400   Dressing Intervention Integrity maintained 08/16/23 0400   Dressing Change Due 08/18/23 08/15/23 1101   Site Change Due 08/18/23 08/15/23 0701   Reason Not Rotated Not due 08/15/23 1101   Number of days: 1       Drips: None documented.      Patient Active Problem List   Diagnosis    Primary hypertension    Hyperlipidemia associated with type 2 diabetes mellitus    CAD (coronary artery disease)    Sleep apnea    Carotid stenosis, bilateral    History of non-ST elevation myocardial infarction (NSTEMI)    PAPA (acute kidney injury)    S/P coronary artery stent placement    Nuclear sclerosis - Right Eye    Primary localized osteoarthrosis, lower leg    PSC (posterior subcapsular cataract), right    DME (diabetic macular edema)    Refractive error    Pseudophakia    Type 2 diabetes mellitus with diabetic peripheral angiopathy without gangrene    Diabetic peripheral neuropathy associated with type 2 diabetes mellitus    Cervical arthritis    Neurogenic claudication    Lumbar herniated disc    Fatty liver disease, nonalcoholic    Arthritis of lumbar spine    Chronic pain    Fusion of spine, lumbar region     Lumbosacral radiculopathy at L5    Type 2 diabetes mellitus with stage 2 chronic kidney disease and hypertension    Asthma    Bradycardia    Delayed surgical wound healing    Rectal ulcer    Palliative care encounter    Moderate malnutrition    Acute deep vein thrombosis (DVT) of femoral vein of right lower extremity    Impaired functional mobility and endurance    (HFpEF) heart failure with preserved ejection fraction    Alteration in skin integrity    Debility    Acute systolic congestive heart failure    Left ureteral injury    Hydronephrosis    Weakness of both lower extremities    Poor balance    Type 2 diabetes mellitus with retinopathy, with long-term current use of insulin    Hypertensive retinopathy, bilateral    SI (sacroiliac) joint dysfunction    S/P ureteral reimplantation    Complex renal cyst    PAD (peripheral artery disease)    Compression fracture of spine    Nontraumatic compression fracture of T12 vertebra    Bilateral renal cysts    Chronic kidney disease, stage 3a    Type 2 diabetes mellitus    History of DVT (deep vein thrombosis)    History of pancreatitis    Hyperbilirubinemia    ATN (acute tubular necrosis)    Hypophosphatemia    Renal impairment    History of hysterectomy    History of tracheostomy    History of COVID-19    Closed fracture of left proximal humerus    NSTEMI (non-ST elevated myocardial infarction)    Other specified anemias    Liver lesion    Healthcare maintenance    STEMI (ST elevation myocardial infarction)    CHF (congestive heart failure)    Abdominal pain    Biliary obstruction    Atrial fibrillation    Gastrointestinal hemorrhage with melena    Anemia       Review of patient's allergies indicates:   Allergen Reactions    Milk containing products (dairy)      Causes GI distress       Current Inpatient Medications:      Current Facility-Administered Medications on File Prior to Encounter   Medication Dose Route  Frequency Provider Last Rate Last Admin    0.9%  NaCl infusion   Intravenous Continuous Aime George  mL/hr at 10/27/22 0842 New Bag at 10/27/22 0842    fentaNYL 50 mcg/mL injection  mcg   mcg Intravenous PRN Mook Chavez, DO        midazolam (VERSED) 1 mg/mL injection 0.5-4 mg  0.5-4 mg Intravenous PRN Mook Chavez, DO         Current Outpatient Medications on File Prior to Encounter   Medication Sig Dispense Refill    ACCU-CHEK EDIN PLUS METER Misc       acetaminophen (TYLENOL) 650 MG TbSR Take 1 tablet (650 mg total) by mouth every 8 (eight) hours. (Patient not taking: Reported on 8/10/2023) 50 tablet 0    albuterol (PROVENTIL/VENTOLIN HFA) 90 mcg/actuation inhaler Inhale 2 puffs into the lungs every 6 (six) hours as needed for Wheezing. (Patient not taking: Reported on 8/10/2023) 1 g 3    apixaban (ELIQUIS) 5 mg Tab Take 1 tablet (5 mg total) by mouth 2 (two) times daily. 60 tablet 1    atorvastatin (LIPITOR) 40 MG tablet Take 1 tablet (40 mg total) by mouth every evening. 90 tablet 3    blood sugar diagnostic (ACCU-CHEK EDIN PLUS TEST STRP) Strp TEST BLOOD SUGARS 4 TIMES DAILY 150 strip 11    cefpodoxime (VANTIN) 100 MG tablet Take 4 tablets (400 mg total) by mouth every 12 (twelve) hours. for 21 days 168 tablet 0    dapagliflozin (FARXIGA) 10 mg tablet Take 1 tablet (10 mg total) by mouth once daily. (Patient not taking: Reported on 7/10/2023) 90 tablet 3    diclofenac sodium (VOLTAREN) 1 % Gel Apply 2 g topically 3 (three) times daily. Apply to the area of pain 2-3x per day or night as needed (Patient not taking: Reported on 7/10/2023) 100 g 3    EScitalopram oxalate (LEXAPRO) 10 MG tablet Take 1 tablet (10 mg total) by mouth once daily. 90 tablet 2    furosemide (LASIX) 40 MG tablet Take 1 tablet (40 mg total) by mouth once daily. 30 tablet 11    gabapentin (NEURONTIN) 300 MG capsule Take 1 capsule (300 mg) in the morning and 2 capsules (600 mg) in the evening 90  "capsule 0    glipiZIDE (GLUCOTROL) 10 MG TR24 Take 1 tablet (10 mg total) by mouth daily with breakfast. (Patient not taking: Reported on 8/10/2023) 90 tablet 3    insulin detemir U-100, Levemir, (LEVEMIR FLEXPEN) 100 unit/mL (3 mL) InPn pen Inject 14 Units into the skin every evening. (Patient taking differently: Inject into the skin every evening. Adjusted dose based on glucose) 12 mL 3    losartan (COZAAR) 25 MG tablet Take 1 tablet (25 mg total) by mouth once daily. 90 tablet 3    magnesium oxide (MAG-OX) 400 mg (241.3 mg magnesium) tablet Take 1 tablet (400 mg total) by mouth 2 (two) times daily. 120 tablet 0    metoprolol succinate (TOPROL-XL) 25 MG 24 hr tablet Take 0.5 tablets (12.5 mg total) by mouth once daily. 15 tablet 11    multivitamin (THERAGRAN) per tablet Take 1 tablet by mouth once daily. (Patient not taking: Reported on 8/10/2023)      oxyCODONE (ROXICODONE) 5 MG immediate release tablet Take 1 tablet (5 mg total) by mouth every 4 (four) hours as needed for Pain. (Patient not taking: Reported on 8/10/2023) 42 tablet 0    pen needle, diabetic (NOVOTWIST) 32 gauge x 1/5" Ndle Use 1 needle to inject insulin four times daily 400 each 2    pen needle, diabetic 32 gauge x 5/32" Ndle Use as directed 100 each 0    spironolactone (ALDACTONE) 25 MG tablet Take 0.5 tablets (12.5 mg total) by mouth once daily. 15 tablet 11    ticagrelor (BRILINTA) 90 mg tablet Take 1 tablet (90 mg total) by mouth 2 (two) times daily. 60 tablet 11       Past Surgical History:   Procedure Laterality Date    ANGIOGRAM, CORONARY, WITH LEFT HEART CATHETERIZATION N/A 8/3/2023    Procedure: Angiogram, Coronary, with Left Heart Cath;  Surgeon: Aime George MD;  Location: [facility] CATH LAB;  Service: Cardiology;  Laterality: N/A;    ANGIOGRAM, CORONARY, WITH LEFT HEART CATHETERIZATION N/A 8/14/2023    Procedure: Angiogram, Coronary, with Left Heart Cath;  Surgeon: Wilman Kim MD;  Location: [facility] CATH LAB;  " Service: Cardiology;  Laterality: N/A;    ANTEGRADE NEPHROSTOGRAPHY Left 2019    Procedure: Nephrostogram - antegrade;  Surgeon: Robin Boyd MD;  Location: [facility] OR 2ND FLR;  Service: Urology;  Laterality: Left;    BRONCHOSCOPY N/A 2019    Procedure: BRONCHOSCOPY;  Surgeon: Sean Ruano MD;  [facility];  Service: General;  Laterality: N/A;    CARDIAC CATHETERIZATION      CATARACT EXTRACTION      cataract extraction left eye      cataracts      CAUDAL EPIDURAL STEROID INJECTION N/A 2019    Procedure: Injection-steroid-epidural-caudal;  Surgeon: Dave Bentley Jr., MD;  [facility] PAIN MGT;  Service: Pain Management;  Laterality: N/A;     SECTION, LOW TRANSVERSE      COLONOSCOPY N/A 2019    Procedure: COLONOSCOPY;  Surgeon: Al Alaniz MD;  Location: [facility] (2ND FLR);  Service: Endoscopy;  Laterality: N/A;    CORONARY ANGIOPLASTY      CYSTOGRAM N/A 2019    Procedure: CYSTOGRAM INTRAOP;  Surgeon: Robin Boyd MD;  [facility];  Service: Urology;  Laterality: N/A;  1 HOUR    CYSTOSCOPY W/ RETROGRADES Left 2019    Procedure: CYSTOSCOPY, WITH RETROGRADE PYELOGRAM;  Surgeon: Robin Boyd MD;  [facility];  Service: Urology;  Laterality: Left;  fluro    ENDOSCOPIC ULTRASOUND OF UPPER GASTROINTESTINAL TRACT N/A 6/15/2023    Procedure: ULTRASOUND, UPPER GI TRACT, ENDOSCOPIC;  Surgeon: Lisa Kim MD;  Location: [facility] ENDO (2ND FLR);  Service: Endoscopy;  Laterality: N/A;    ENDOSCOPIC ULTRASOUND OF UPPER GASTROINTESTINAL TRACT  2023    Procedure: ULTRASOUND, UPPER GI TRACT, ENDOSCOPIC;  Surgeon: Wayne Adamson MD;  Location: [facility] ENDO (2ND FLR);  Service: Endoscopy;;    ERCP N/A 6/15/2023    Procedure: ERCP (ENDOSCOPIC RETROGRADE CHOLANGIOPANCREATOGRAPHY);  Surgeon: Lisa Kim MD;  Location: [facility] ENDO (2ND FLR);  Service: Endoscopy;  Laterality: N/A;    ERCP N/A 2023    Procedure: ERCP  (ENDOSCOPIC RETROGRADE CHOLANGIOPANCREATOGRAPHY);  Surgeon: Wayne Adamson MD;  Location: [facility] ENDO (2ND FLR);  Service: Endoscopy;  Laterality: N/A;  eliquis and pletal ok to hold-see te 7/26/23-dr [id number] portal    ERCP N/A 8/17/2023    Procedure: ERCP (ENDOSCOPIC RETROGRADE CHOLANGIOPANCREATOGRAPHY);  Surgeon: Wayne Adamson MD;  Location: [facility] ENDO (2ND FLR);  Service: Endoscopy;  Laterality: N/A;    ESOPHAGOGASTRODUODENOSCOPY W/ PEG  5/2/2019    Procedure: EGD, WITH PEG TUBE INSERTION;  Surgeon: Sean Ruano MD;  Location: [facility] OR 2ND FLR;  Service: General;;    EXCISION TURBINATE, SUBMUCOUS      FLEXIBLE SIGMOIDOSCOPY N/A 5/13/2019    Procedure: SIGMOIDOSCOPY, FLEXIBLE;  Surgeon: ALBERTO Aimn MD;  Location: [facility] ENDO (2ND FLR);  Service: Endoscopy;  Laterality: N/A;    FLEXIBLE SIGMOIDOSCOPY N/A 5/21/2019    Procedure: SIGMOIDOSCOPY, FLEXIBLE;  Surgeon: ALBERTO Amin MD;  Location: [facility] (2ND FLR);  Service: Endoscopy;  Laterality: N/A;    FUSION OF LUMBAR SPINE BY ANTERIOR APPROACH Left 4/12/2019    Procedure: FUSION, SPINE, LUMBAR, ANTERIOR APPROACH Left L5-S1 Anterior to Psoa Interbody Fusion, L5-S1 Posterior Instrumentation;  Surgeon: Mk George MD;  [facility];  Service: Neurosurgery;  Laterality: Left;  Porcedure:  Left L5-S1 Anterior to Psoa Interbody Fusion, L5-S1 Posterior Instrumentation  Surgery Time: 4 Hrs  LOS: 2-3  Anesthesia: General   Blood: Type & Screen  R    HAND SURGERY Left     HAND SURGERY Right     torn ligament repair/ Dr. Yeboah    HYSTERECTOMY      INJECTION OF STEROID Right 12/10/2020    Procedure: INJECTION, STEROID Right SI Joint Block and Steroid Injection;  Surgeon: Mk George MD;  Location: [facility];  Service: Neurosurgery;  Laterality: Right;  Procedure: Right SI Joint Block and Steroid Injection   SUrgery Time: 30 Min  LOS: 0  Anesthesia: MAC  Radiology: C-arm  Bed: [place] [id number] Poster  Position: Prone     INJECTION OF STEROID Right 9/28/2021    Procedure: INJECTION, STEROID Right SI joint block & steroid injection;  Surgeon: Mk George MD;  Location: [facility];  Service: Neurosurgery;  Laterality: Right;  Procedure: Right SI joint block & steroid injection  Surgery Time: 30m  Anesthesia: Local MAC  Radiology: C-arm  Bed: Regular  Position: Prone    IVUS, CORONARY  8/14/2023    Procedure: IVUS, Coronary;  Surgeon: Wilman Kim MD;  Location: [facility] CATH LAB;  Service: Cardiology;;    LAPAROSCOPIC CHOLECYSTECTOMY N/A 6/23/2023    Procedure: CHOLECYSTECTOMY, LAPAROSCOPIC;  Surgeon: Richard Penny MD;  Location: [address];  Service: General;  Laterality: N/A;    left foot surgery      left wrist surgery      LYSIS OF ADHESIONS N/A 2/19/2020    Procedure: LYSIS, ADHESIONS;  Surgeon: Robin Boyd MD;  Location: [address];  Service: Urology;  Laterality: N/A;    NASAL SEPTUM SURGERY  5/7/15    OPEN REDUCTION AND INTERNAL FIXATION (ORIF) OF FRACTURE OF PROXIMAL HUMERUS Left 7/12/2023    Procedure: ORIF, FRACTURE, HUMERUS, PROXIMAL ;  Surgeon: Tomasz Leary MD;  Location: [address];  Service: Orthopedics;  Laterality: Left;    PERCUTANEOUS NEPHROSTOMY Left 4/21/2019    Procedure: Creation, Nephrostomy, Percutaneous;  Surgeon: Karina Surgeon;  [facility];  Service: Anesthesiology;  Laterality: Left;    REPAIR OF URETER  4/12/2019    Procedure: REPAIR, URETER;  Surgeon: Mk George MD;  Location: [address];  Service: Neurosurgery;;    REPLACEMENT OF NEPHROSTOMY TUBE N/A 7/18/2019    Procedure: REPLACEMENT, NEPHROSTOMY TUBE;  Surgeon: [facility] Diagnostic Provider;  [address]  Service: Anesthesiology;  Laterality: N/A;  188    REPLACEMENT OF NEPHROSTOMY TUBE N/A 7/24/2019    Procedure: REPLACEMENT, NEPHROSTOMY TUBE;  Surgeon: [facility] Diagnostic Provider;  [address]  Service: Anesthesiology;  Laterality: N/A;  188    REPLACEMENT OF  NEPHROSTOMY TUBE N/A 10/7/2019    Procedure: REPLACEMENT, NEPHROSTOMY TUBE;  Surgeon: [facility] Diagnostic Provider;  [address]  Service: Anesthesiology;  Laterality: N/A;  189    REPLACEMENT OF NEPHROSTOMY TUBE N/A 11/25/2019    Procedure: REPLACEMENT, NEPHROSTOMY TUBE;  Surgeon: [facility] Diagnostic Provider;  [facility];  Service: Anesthesiology;  Laterality: N/A;  Room 188/Bessy    REPLACEMENT OF NEPHROSTOMY TUBE Right 2/19/2020    Procedure: REPLACEMENT, NEPHROSTOMY TUBE removal removal;  Surgeon: Robin Boyd MD;  Location: [facility] OR [address] [place] [address];  Service: Urology;  Laterality: Right;    RIGHT HEART CATHETERIZATION Right 8/3/2023    Procedure: INSERTION, CATHETER, RIGHT HEART;  Surgeon: Aime George MD;  Location: [facility] CATH LAB;  Service: Cardiology;  Laterality: Right;    rt elbow surgery      S/P LAD COATED STENT  05/14/2010    6 total     S/P OM1 STENT  08/2003    SINUS SURGERY      F.E.S.S.    STENT, DRUG ELUTING, SINGLE VESSEL, CORONARY  8/14/2023    Procedure: Stent, Drug Eluting, Single Vessel, Coronary;  Surgeon: Wilman Kim MD;  Location: [facility] CATH LAB;  Service: Cardiology;;    TRACHEOSTOMY N/A 5/2/2019    Procedure: CREATION, TRACHEOSTOMY;  Surgeon: Sean Ruano MD;  Location: [address];  Service: General;  Laterality: N/A;    TUBAL LIGATION      URETERAL REIMPLANTION Left 2/19/2020    Procedure: REIMPLANTATION, URETER WITH PSOAS HITCH;  Surgeon: Robin Boyd MD;  Location: [address];  Service: Urology;  Laterality: Left;    VENTRICULOGRAM, LEFT  8/3/2023    Procedure: Ventriculogram, Left;  Surgeon: Aime George MD;  Location: [facility] CATH LAB;  Service: Cardiology;;       Social History:  Tobacco Use: Low Risk  (8/22/2023)    Patient History     Smoking Tobacco Use: Never     Smokeless Tobacco Use: Never     Passive Exposure: Not on file      Alcohol Use: Not At Risk (6/10/2023)    AUDIT-C     Frequency of Alcohol Consumption:  Never     Average Number of Drinks: Not on file     Frequency of Binge Drinking: Never        OBJECTIVE:     Vital Signs Range (Last 24H):  Temp:  [36.6 °C (97.8 °F)-37.3 °C (99.1 °F)]   Pulse:  [81-94]   Resp:  [16-19]   BP: (128-176)/(56-85)   SpO2:  [92 %-100 %]       Significant Labs:  Lab Results   Component Value Date    WBC 10.52 08/22/2023    HGB 8.8 (L) 08/22/2023    HCT 27.7 (L) 08/22/2023     08/22/2023    CHOL 190 08/02/2023    TRIG 152 (H) 08/02/2023    HDL 26 (L) 08/02/2023    ALT 80 (H) 08/22/2023     (H) 08/22/2023     08/22/2023    K 4.0 08/22/2023     08/22/2023    CREATININE 0.7 08/22/2023    BUN 9 08/22/2023    CO2 24 08/22/2023    TSH 1.230 10/25/2022    INR 1.2 08/22/2023    GLUF 217 (H) 09/15/2014    HGBA1C 8.8 (H) 07/10/2023       Diagnostic Studies: No relevant studies.    EKG:   Results for orders placed or performed during the hospital encounter of 08/14/23   EKG 12-lead    Collection Time: 08/19/23  4:41 PM    Narrative    Test Reason : R94.31,    Vent. Rate : 089 BPM     Atrial Rate : 089 BPM     P-R Int : 152 ms          QRS Dur : 080 ms      QT Int : 394 ms       P-R-T Axes : 045 -14 178 degrees     QTc Int : 479 ms    Normal sinus rhythm  T wave abnormality, consider lateral ischemia  Prolonged QT  Abnormal ECG  When compared with ECG of 14-AUG-2023 21:44,  Nonspecific T wave abnormality, improved in Inferior leads  Confirmed by Svitlana Palumbo MD ([id number]) on 8/20/2023 12:32:04 PM    Referred By: MICHAEL DEAN           Confirmed By:Svitlana Palumbo MD       2D ECHO:  TTE:  Results for orders placed or performed during the hospital encounter of 08/14/23   Echo   Result Value Ref Range    Ascending aorta 2.67 cm    STJ 2.09 cm    AV mean gradient 1 mmHg    Ao peak oleksandr 0.78 m/s    Ao VTI 15.27 cm    IVS 0.87 0.6 - 1.1 cm    LA size 3.68 cm    Left Atrium Major Axis 4.84 cm    Left Atrium Minor Axis 4.84 cm    LVIDd 4.86 3.5 - 6.0 cm    LVIDs 3.92 2.1 - 4.0 cm     "LVOT diameter 1.98 cm    LVOT peak VTI 14.98 cm    Posterior Wall 0.89 0.6 - 1.1 cm    MV Peak A Canelo 0.81 m/s    E wave deceleration time 162.08 msec    MV Peak E Canelo 0.60 m/s    PV Peak D Canelo 0.41 m/s    PV Peak S Canelo 0.42 m/s    RA Major Axis 4.65 cm    RA Width 3.47 cm    Sinus 2.96 cm    LA WIDTH 3.8 cm    MV stenosis pressure 1/2 time 47.00 ms    LV Diastolic Volume 110.46 mL    LV Systolic Volume 66.66 mL    LVOT peak canelo 0.67 m/s    TDI LATERAL 0.07 m/s    TDI SEPTAL 0.04 m/s    LA volume (mod) 39.50 cm3    MV "A" wave duration 7.61 msec    LV LATERAL E/E' RATIO 8.57 m/s    LV SEPTAL E/E' RATIO 15.00 m/s    FS 19 %    LA volume 57.53 cm3    LV mass 146.47 g    Left Ventricle Relative Wall Thickness 0.37 cm    AV valve area 3.02 cm²    AV Velocity Ratio 0.86     AV index (prosthetic) 0.98     MV valve area p 1/2 method 4.68 cm2    E/A ratio 0.74     Mean e' 0.06 m/s    Pulm vein S/D ratio 1.02     LVOT area 3.1 cm2    LVOT stroke volume 46.10 cm3    AV peak gradient 2 mmHg    E/E' ratio 10.91 m/s    DONNY by Velocity Ratio 2.64 cm²    BSA 1.62 m2    LV Systolic Volume Index 41.1 mL/m2    LV Diastolic Volume Index 68.19 mL/m2    LV Mass Index 90 g/m2    LA Volume Index 35.5 mL/m2    LA Volume Index (Mod) 24.4 mL/m2    ZLVIDS 2.54     ZLVIDD 0.59     Est. RA pres 3 mmHg    EF 40 %    Narrative      Left Ventricle: The left ventricle is normal in size. Ventricular mass   is normal. Normal wall thickness. regional wall motion abnormalities   present. See diagram for wall motion findings. There is moderately reduced   systolic function. Ejection fraction by visual approximation is 40%. Grade   II diastolic dysfunction.    Left Atrium: Left atrium is mildly dilated. The left atrium volume   index is 35.5 mL/m2.    Right Ventricle: Normal right ventricular cavity size. Wall thickness   is normal. Right ventricle wall motion  is normal. Systolic function is   normal.    Mitral Valve: There is mild regurgitation.    " IVC/SVC: Normal venous pressure at 3 mmHg.    Liver parenchyma is inhomgenous.  Clinical correlation is required.         LUNA:  No results found. However, due to the size of the patient record, not all encounters were searched. Please check Results Review for a complete set of results.    ASSESSMENT/PLAN:           Pre-op Assessment    I have reviewed the Patient Summary Reports.     I have reviewed the Nursing Notes. I have reviewed the NPO Status.   I have reviewed the Medications.     Review of Systems  Anesthesia Hx:  Denies Family Hx of Anesthesia complications.   Denies Personal Hx of Anesthesia complications.   Social:  No Alcohol Use, Non-Smoker    Cardiovascular:   Hypertension Past MI CAD  CABG/stent  CHF    Pulmonary:   Denies COPD. Asthma    Hepatic/GI:   PUD,    Musculoskeletal:   Arthritis     Neurological:   Neuromuscular Disease,    Endocrine:   Diabetes  Denies Obesity / BMI > 30      Physical Exam  General: Well nourished, Cooperative, Alert and Oriented    Airway:  Mallampati: III   Mouth Opening: Normal  TM Distance: Normal  Tongue: Normal  Neck ROM: Normal ROM    Dental:  Partial Dentures  Partial denture top right      Anesthesia Plan  Type of Anesthesia, risks & benefits discussed:    Anesthesia Type: Gen ETT  Intra-op Monitoring Plan: Standard ASA Monitors and Art Line  Post Op Pain Control Plan: multimodal analgesia and IV/PO Opioids PRN  Induction:  IV  Airway Plan: Direct and Video, Post-Induction  Informed Consent: Informed consent signed with the Patient and all parties understand the risks and agree with anesthesia plan.  All questions answered.   ASA Score: 4  Day of Surgery Review of History & Physical: H&P Update referred to the surgeon/provider.    Ready For Surgery From Anesthesia Perspective.     .

## 2023-08-22 NOTE — H&P
Short Stay Endoscopy History and Physical    PCP - Jasbir Haney MD  Referring Physician - Lisa Kim MD  [address] [place] [address]    Procedure - ercp  ASA - per anesthesia  Mallampati - per anesthesia  History of Anesthesia problems - no  Family history Anesthesia problems -  no   Plan of anesthesia - General    HPI:  This is a 63 y.o. female here for evaluation of: pancreatic cancer    Reflux - no  Dysphagia - no  Abdominal pain - no  Diarrhea - no    ROS:  Constitutional: No fevers, chills, No weight loss  CV: No chest pain  Pulm: No cough, No shortness of breath  Ophtho: No vision changes  GI: see HPI  Derm: No rash    Medical History:  has a past medical history of Anticoagulant long-term use, Asthma, Back pain, Bradycardia, unspecified (2019), CAD (coronary artery disease), Carotid artery stenosis, Chronic combined systolic and diastolic CHF (congestive heart failure) (2019), Deep vein thrombosis, Diabetes mellitus type 2 in obese, HTN (hypertension), benign, Hyperlipidemia, Keloid cicatrix, NSTEMI (non-ST elevated myocardial infarction), Nuclear sclerosis - Right Eye (2014), Primary localized osteoarthrosis, lower leg (2014), Senile nuclear sclerosis (2015), Sleep apnea, and 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    Surgical History:  has a past surgical history that includes left foot surgery; left wrist surgery; rt elbow surgery;  section, low transverse; Tubal ligation; cataract extraction left eye; cataracts; S/P OM1 STENT (2003); S/P LAD COATED STENT (2010); Cardiac catheterization; Coronary angioplasty; Hysterectomy; Hand surgery (Left); Excision turbinate, submucous; Sinus surgery; Nasal septum surgery (5/7/15); Hand surgery (Right); Caudal epidural steroid injection (N/A, 2019); Fusion of lumbar  spine by anterior approach (Left, 4/12/2019); Repair of ureter (4/12/2019); Percutaneous nephrostomy (Left, 4/21/2019); Tracheostomy (N/A, 5/2/2019); Bronchoscopy (N/A, 5/2/2019); Esophagogastroduodenoscopy w/ PEG (5/2/2019); Flexible sigmoidoscopy (N/A, 5/13/2019); Flexible sigmoidoscopy (N/A, 5/21/2019); Colonoscopy (N/A, 5/24/2019); Replacement of nephrostomy tube (N/A, 7/18/2019); Replacement of nephrostomy tube (N/A, 7/24/2019); Replacement of nephrostomy tube (N/A, 10/7/2019); Replacement of nephrostomy tube (N/A, 11/25/2019); Cataract extraction; Cystogram (N/A, 12/11/2019); Antegrade nephrostography (Left, 12/11/2019); Cystoscopy w/ retrogrades (Left, 12/11/2019); Lysis of adhesions (N/A, 2/19/2020); Ureteral reimplantion (Left, 2/19/2020); Replacement of nephrostomy tube (Right, 2/19/2020); Injection of steroid (Right, 12/10/2020); Injection of steroid (Right, 9/28/2021); Endoscopic ultrasound of upper gastrointestinal tract (N/A, 6/15/2023); ERCP (N/A, 6/15/2023); Laparoscopic cholecystectomy (N/A, 6/23/2023); Open reduction and internal fixation (ORIF) of fracture of proximal humerus (Left, 7/12/2023); ANGIOGRAM, CORONARY, WITH LEFT HEART CATHETERIZATION (N/A, 8/3/2023); ventriculogram, left (8/3/2023); Right heart catheterization (Right, 8/3/2023); ANGIOGRAM, CORONARY, WITH LEFT HEART CATHETERIZATION (N/A, 8/14/2023); ivus, coronary (8/14/2023); stent, drug eluting, single vessel, coronary (8/14/2023); ERCP (N/A, 8/17/2023); ERCP (N/A, 8/17/2023); and Endoscopic ultrasound of upper gastrointestinal tract (8/17/2023).    Family History: family history includes Diabetes in her brother, father, mother, sister, and sister; Heart attack in her father; Heart disease in her mother; Leukemia in her father; No Known Problems in her brother, brother, maternal aunt, maternal grandfather, maternal grandmother, maternal uncle, paternal aunt, paternal grandfather, paternal grandmother, paternal uncle, sister, son, and  son..    Social History:  reports that she has never smoked. She has never used smokeless tobacco. She reports that she does not drink alcohol and does not use drugs.    Review of patient's allergies indicates:   Allergen Reactions    Milk containing products (dairy)      Causes GI distress       Medications:   Medications Prior to Admission   Medication Sig Dispense Refill Last Dose    ACCU-CHEK EDIN PLUS METER Misc        acetaminophen (TYLENOL) 650 MG TbSR Take 1 tablet (650 mg total) by mouth every 8 (eight) hours. (Patient not taking: Reported on 8/10/2023) 50 tablet 0     albuterol (PROVENTIL/VENTOLIN HFA) 90 mcg/actuation inhaler Inhale 2 puffs into the lungs every 6 (six) hours as needed for Wheezing. (Patient not taking: Reported on 8/10/2023) 1 g 3     apixaban (ELIQUIS) 5 mg Tab Take 1 tablet (5 mg total) by mouth 2 (two) times daily. 60 tablet 1     atorvastatin (LIPITOR) 40 MG tablet Take 1 tablet (40 mg total) by mouth every evening. 90 tablet 3     blood sugar diagnostic (ACCU-CHEK EDIN PLUS TEST STRP) Strp TEST BLOOD SUGARS 4 TIMES DAILY 150 strip 11     cefpodoxime (VANTIN) 100 MG tablet Take 4 tablets (400 mg total) by mouth every 12 (twelve) hours. for 21 days 168 tablet 0     dapagliflozin (FARXIGA) 10 mg tablet Take 1 tablet (10 mg total) by mouth once daily. (Patient not taking: Reported on 7/10/2023) 90 tablet 3     diclofenac sodium (VOLTAREN) 1 % Gel Apply 2 g topically 3 (three) times daily. Apply to the area of pain 2-3x per day or night as needed (Patient not taking: Reported on 7/10/2023) 100 g 3     EScitalopram oxalate (LEXAPRO) 10 MG tablet Take 1 tablet (10 mg total) by mouth once daily. 90 tablet 2     furosemide (LASIX) 40 MG tablet Take 1 tablet (40 mg total) by mouth once daily. 30 tablet 11     gabapentin (NEURONTIN) 300 MG capsule Take 1 capsule (300 mg) in the morning and 2 capsules (600 mg) in the evening 90 capsule 0     glipiZIDE (GLUCOTROL) 10 MG TR24 Take 1 tablet (10  "mg total) by mouth daily with breakfast. (Patient not taking: Reported on 8/10/2023) 90 tablet 3     insulin detemir U-100, Levemir, (LEVEMIR FLEXPEN) 100 unit/mL (3 mL) InPn pen Inject 14 Units into the skin every evening. (Patient taking differently: Inject into the skin every evening. Adjusted dose based on glucose) 12 mL 3     losartan (COZAAR) 25 MG tablet Take 1 tablet (25 mg total) by mouth once daily. 90 tablet 3     magnesium oxide (MAG-OX) 400 mg (241.3 mg magnesium) tablet Take 1 tablet (400 mg total) by mouth 2 (two) times daily. 120 tablet 0     metoprolol succinate (TOPROL-XL) 25 MG 24 hr tablet Take 0.5 tablets (12.5 mg total) by mouth once daily. 15 tablet 11     multivitamin (THERAGRAN) per tablet Take 1 tablet by mouth once daily. (Patient not taking: Reported on 8/10/2023)       oxyCODONE (ROXICODONE) 5 MG immediate release tablet Take 1 tablet (5 mg total) by mouth every 4 (four) hours as needed for Pain. (Patient not taking: Reported on 8/10/2023) 42 tablet 0     pen needle, diabetic (NOVOTWIST) 32 gauge x 1/5" Ndle Use 1 needle to inject insulin four times daily 400 each 2     pen needle, diabetic 32 gauge x 5/32" Ndle Use as directed 100 each 0     spironolactone (ALDACTONE) 25 MG tablet Take 0.5 tablets (12.5 mg total) by mouth once daily. 15 tablet 11     ticagrelor (BRILINTA) 90 mg tablet Take 1 tablet (90 mg total) by mouth 2 (two) times daily. 60 tablet 11        Physical Exam:    Vital Signs:   Vitals:    08/22/23 1452   BP: (!) 146/67   Pulse: 89   Resp: 16   Temp: 98.2 °F (36.8 °C)       General Appearance: Well appearing in no acute distress    Labs:  Lab Results   Component Value Date    WBC 10.52 08/22/2023    HGB 8.8 (L) 08/22/2023    HCT 27.7 (L) 08/22/2023     08/22/2023    CHOL 190 08/02/2023    TRIG 152 (H) 08/02/2023    HDL 26 (L) 08/02/2023    ALT 80 (H) 08/22/2023     (H) 08/22/2023     08/22/2023    K 4.0 08/22/2023     08/22/2023    CREATININE 0.7 " 08/22/2023    BUN 9 08/22/2023    CO2 24 08/22/2023    TSH 1.230 10/25/2022    INR 1.2 08/22/2023    GLUF 217 (H) 09/15/2014    HGBA1C 8.8 (H) 07/10/2023       I have explained the risks and benefits of this endoscopic procedure to the patient including but not limited to bleeding, inflammation, infection, perforation, and death.      Christian Huff MD

## 2023-08-22 NOTE — ASSESSMENT & PLAN NOTE
Home Antihyperglycemic Regiment:  -Farxiga, glipizide, insulin daily at home  - Titrate and addition of insulin as needed for BG goal 140-180  - SSI with POCT accuchecks ACHS and Diabetic diet 2000 beti  - Diabetic nutritional counseling given   - Continue home gabapentin for diabetic peripheral neuropathy  - Appreciate Endocrinology recs  - Adjust insulin and monitor closely  - Endocrine following

## 2023-08-22 NOTE — PLAN OF CARE
[facility] - Cardiology Stepdown  Discharge Reassessment    Primary Care Provider: Jasbir Haney MD    Expected Discharge Date: 8/25/2023    Reassessment (most recent)       Discharge Reassessment - 08/22/23 1357          Discharge Reassessment    Assessment Type Discharge Planning Reassessment     Did the patient's condition or plan change since previous assessment? Yes     Communicated REBECCA with patient/caregiver Date not available/Unable to determine     Discharge Plan A Home Health     Discharge Plan B Skilled Nursing Facility     DME Needed Upon Discharge  none     Transition of Care Barriers None     Why the patient remains in the hospital Requires continued medical care        Post-Acute Status    Post-Acute Authorization Home Health     Post-Acute Placement Status Discharge Plan Changed     Home Health Status Pending medical clearance/testing                 Therapy recs changed to .  Pt to resume care with [facility] when medically cleared.  Will continue to follow.      Anastacia Mesa LMSW  [facility]  [id number]

## 2023-08-22 NOTE — TRANSFER OF CARE
Anesthesia Transfer of Care Note    Patient: Mariann Huff    Procedure(s) Performed: Procedure(s) (LRB):  ERCP (ENDOSCOPIC RETROGRADE CHOLANGIOPANCREATOGRAPHY) (N/A)    Patient location: PACU    Anesthesia Type: general    Transport from OR: Transported from OR on room air with adequate spontaneous ventilation    Post pain: adequate analgesia    Post assessment: no apparent anesthetic complications    Post vital signs: stable    Level of consciousness: responds to stimulation and awake    Nausea/Vomiting: no nausea/vomiting    Complications: none    Transfer of care protocol was followed      Last vitals:   Visit Vitals  /67   Pulse 71   Temp 36   Resp 23   Ht    Wt    LMP    SpO2 99%   Breastfeeding    BMI

## 2023-08-23 DIAGNOSIS — C25.9 PANCREATIC ADENOCARCINOMA: Primary | ICD-10-CM

## 2023-08-23 PROBLEM — I48.91 ATRIAL FIBRILLATION: Status: RESOLVED | Noted: 2023-08-19 | Resolved: 2023-08-23

## 2023-08-23 PROBLEM — K92.1 GASTROINTESTINAL HEMORRHAGE WITH MELENA: Status: RESOLVED | Noted: 2023-08-20 | Resolved: 2023-08-23

## 2023-08-23 PROBLEM — R10.9 ABDOMINAL PAIN: Status: RESOLVED | Noted: 2023-08-16 | Resolved: 2023-08-23

## 2023-08-23 PROBLEM — R65.21 SEPTIC SHOCK: Status: ACTIVE | Noted: 2023-08-23

## 2023-08-23 PROBLEM — K83.1 BILIARY OBSTRUCTION: Status: RESOLVED | Noted: 2023-08-18 | Resolved: 2023-08-23

## 2023-08-23 PROBLEM — A41.9 SEPTIC SHOCK: Status: ACTIVE | Noted: 2023-08-23

## 2023-08-23 PROBLEM — R57.9 SHOCK: Status: ACTIVE | Noted: 2023-08-23

## 2023-08-23 LAB
ABO + RH BLD: NORMAL
ALBUMIN SERPL BCP-MCNC: 1.5 G/DL (ref 3.5–5.2)
ALLENS TEST: ABNORMAL
ALP SERPL-CCNC: 743 U/L (ref 55–135)
ALT SERPL W/O P-5'-P-CCNC: 90 U/L (ref 10–44)
AMMONIA PLAS-SCNC: 33 UMOL/L (ref 10–50)
ANION GAP SERPL CALC-SCNC: 10 MMOL/L (ref 8–16)
ANION GAP SERPL CALC-SCNC: 16 MMOL/L (ref 8–16)
ANISOCYTOSIS BLD QL SMEAR: SLIGHT
ANISOCYTOSIS BLD QL SMEAR: SLIGHT
APTT PPP: 29.6 SEC (ref 21–32)
AST SERPL-CCNC: 172 U/L (ref 10–40)
BACTERIA #/AREA URNS AUTO: ABNORMAL /HPF
BACTERIA #/AREA URNS AUTO: ABNORMAL /HPF
BASOPHILS # BLD AUTO: 0.02 K/UL (ref 0–0.2)
BASOPHILS # BLD AUTO: 0.03 K/UL (ref 0–0.2)
BASOPHILS NFR BLD: 0.2 % (ref 0–1.9)
BASOPHILS NFR BLD: 0.3 % (ref 0–1.9)
BILIRUB SERPL-MCNC: 7.6 MG/DL (ref 0.1–1)
BILIRUB UR QL STRIP: ABNORMAL
BILIRUB UR QL STRIP: ABNORMAL
BLD GP AB SCN CELLS X3 SERPL QL: NORMAL
BLD PROD TYP BPU: NORMAL
BLOOD UNIT EXPIRATION DATE: NORMAL
BLOOD UNIT TYPE CODE: 5100
BLOOD UNIT TYPE: NORMAL
BNP SERPL-MCNC: 1327 PG/ML (ref 0–99)
BUN SERPL-MCNC: 10 MG/DL (ref 8–23)
BUN SERPL-MCNC: 12 MG/DL (ref 8–23)
CALCIUM SERPL-MCNC: 8.5 MG/DL (ref 8.7–10.5)
CALCIUM SERPL-MCNC: 8.6 MG/DL (ref 8.7–10.5)
CANCER AG125 SERPL-ACNC: 2413 U/ML (ref 0–30)
CHLORIDE SERPL-SCNC: 104 MMOL/L (ref 95–110)
CHLORIDE SERPL-SCNC: 106 MMOL/L (ref 95–110)
CLARITY UR REFRACT.AUTO: ABNORMAL
CLARITY UR REFRACT.AUTO: ABNORMAL
CO2 SERPL-SCNC: 19 MMOL/L (ref 23–29)
CO2 SERPL-SCNC: 22 MMOL/L (ref 23–29)
CODING SYSTEM: NORMAL
COLOR UR AUTO: ABNORMAL
COLOR UR AUTO: ABNORMAL
CREAT SERPL-MCNC: 0.8 MG/DL (ref 0.5–1.4)
CREAT SERPL-MCNC: 0.9 MG/DL (ref 0.5–1.4)
CROSSMATCH INTERPRETATION: NORMAL
DELSYS: ABNORMAL
DIFFERENTIAL METHOD: ABNORMAL
DIFFERENTIAL METHOD: ABNORMAL
DISPENSE STATUS: NORMAL
EOSINOPHIL # BLD AUTO: 0.1 K/UL (ref 0–0.5)
EOSINOPHIL # BLD AUTO: 0.1 K/UL (ref 0–0.5)
EOSINOPHIL NFR BLD: 0.4 % (ref 0–8)
EOSINOPHIL NFR BLD: 0.8 % (ref 0–8)
EP: 5
ERYTHROCYTE [DISTWIDTH] IN BLOOD BY AUTOMATED COUNT: 17.6 % (ref 11.5–14.5)
ERYTHROCYTE [DISTWIDTH] IN BLOOD BY AUTOMATED COUNT: 18.1 % (ref 11.5–14.5)
EST. GFR  (NO RACE VARIABLE): >60 ML/MIN/1.73 M^2
EST. GFR  (NO RACE VARIABLE): >60 ML/MIN/1.73 M^2
FIO2: 30
GLUCOSE SERPL-MCNC: 119 MG/DL (ref 70–110)
GLUCOSE SERPL-MCNC: 134 MG/DL (ref 70–110)
GLUCOSE UR QL STRIP: ABNORMAL
GLUCOSE UR QL STRIP: ABNORMAL
HCT VFR BLD AUTO: 29.4 % (ref 37–48.5)
HCT VFR BLD AUTO: 35 % (ref 37–48.5)
HGB BLD-MCNC: 11 G/DL (ref 12–16)
HGB BLD-MCNC: 9.1 G/DL (ref 12–16)
HGB UR QL STRIP: ABNORMAL
HGB UR QL STRIP: NEGATIVE
HYALINE CASTS UR QL AUTO: 0 /LPF
HYALINE CASTS UR QL AUTO: 0 /LPF
IMM GRANULOCYTES # BLD AUTO: 0.08 K/UL (ref 0–0.04)
IMM GRANULOCYTES # BLD AUTO: 0.1 K/UL (ref 0–0.04)
IMM GRANULOCYTES NFR BLD AUTO: 0.7 % (ref 0–0.5)
IMM GRANULOCYTES NFR BLD AUTO: 0.8 % (ref 0–0.5)
INR PPP: 1.3 (ref 0.8–1.2)
INR PPP: 1.3 (ref 0.8–1.2)
IP: 10
KETONES UR QL STRIP: ABNORMAL
KETONES UR QL STRIP: NEGATIVE
LACTATE SERPL-SCNC: 2.1 MMOL/L (ref 0.5–2.2)
LDH SERPL L TO P-CCNC: 1.84 MMOL/L (ref 0.36–1.25)
LEUKOCYTE ESTERASE UR QL STRIP: ABNORMAL
LEUKOCYTE ESTERASE UR QL STRIP: ABNORMAL
LYMPHOCYTES # BLD AUTO: 0.8 K/UL (ref 1–4.8)
LYMPHOCYTES # BLD AUTO: 0.9 K/UL (ref 1–4.8)
LYMPHOCYTES NFR BLD: 7 % (ref 18–48)
LYMPHOCYTES NFR BLD: 7.8 % (ref 18–48)
MAGNESIUM SERPL-MCNC: 2.1 MG/DL (ref 1.6–2.6)
MAGNESIUM SERPL-MCNC: 2.2 MG/DL (ref 1.6–2.6)
MCH RBC QN AUTO: 26.1 PG (ref 27–31)
MCH RBC QN AUTO: 26.4 PG (ref 27–31)
MCHC RBC AUTO-ENTMCNC: 31 G/DL (ref 32–36)
MCHC RBC AUTO-ENTMCNC: 31.4 G/DL (ref 32–36)
MCV RBC AUTO: 84 FL (ref 82–98)
MCV RBC AUTO: 85 FL (ref 82–98)
MICROSCOPIC COMMENT: ABNORMAL
MICROSCOPIC COMMENT: ABNORMAL
MODE: ABNORMAL
MONOCYTES # BLD AUTO: 0.5 K/UL (ref 0.3–1)
MONOCYTES # BLD AUTO: 0.7 K/UL (ref 0.3–1)
MONOCYTES NFR BLD: 4.5 % (ref 4–15)
MONOCYTES NFR BLD: 6.1 % (ref 4–15)
NEUTROPHILS # BLD AUTO: 10 K/UL (ref 1.8–7.7)
NEUTROPHILS # BLD AUTO: 10.2 K/UL (ref 1.8–7.7)
NEUTROPHILS NFR BLD: 84.6 % (ref 38–73)
NEUTROPHILS NFR BLD: 86.8 % (ref 38–73)
NITRITE UR QL STRIP: NEGATIVE
NITRITE UR QL STRIP: NEGATIVE
NON-SQ EPI CELLS #/AREA URNS AUTO: 2 /HPF
NON-SQ EPI CELLS #/AREA URNS AUTO: 3 /HPF
NRBC BLD-RTO: 0 /100 WBC
NRBC BLD-RTO: 0 /100 WBC
OVALOCYTES BLD QL SMEAR: ABNORMAL
OVALOCYTES BLD QL SMEAR: ABNORMAL
PH UR STRIP: 5 [PH] (ref 5–8)
PH UR STRIP: 7 [PH] (ref 5–8)
PLATELET # BLD AUTO: 309 K/UL (ref 150–450)
PLATELET # BLD AUTO: 313 K/UL (ref 150–450)
PLATELET BLD QL SMEAR: ABNORMAL
PLATELET BLD QL SMEAR: ABNORMAL
PLATELET RESPONSE PLAVIX: 161 PRU (ref 194–418)
PMV BLD AUTO: 12 FL (ref 9.2–12.9)
PMV BLD AUTO: 12.1 FL (ref 9.2–12.9)
POCT GLUCOSE: 130 MG/DL (ref 70–110)
POCT GLUCOSE: 245 MG/DL (ref 70–110)
POCT GLUCOSE: 288 MG/DL (ref 70–110)
POCT GLUCOSE: 292 MG/DL (ref 70–110)
POIKILOCYTOSIS BLD QL SMEAR: SLIGHT
POIKILOCYTOSIS BLD QL SMEAR: SLIGHT
POLYCHROMASIA BLD QL SMEAR: ABNORMAL
POTASSIUM SERPL-SCNC: 3.8 MMOL/L (ref 3.5–5.1)
POTASSIUM SERPL-SCNC: 4.2 MMOL/L (ref 3.5–5.1)
PROT SERPL-MCNC: 6.6 G/DL (ref 6–8.4)
PROT UR QL STRIP: ABNORMAL
PROT UR QL STRIP: ABNORMAL
PROTHROMBIN TIME: 13.9 SEC (ref 9–12.5)
PROTHROMBIN TIME: 13.9 SEC (ref 9–12.5)
RBC # BLD AUTO: 3.48 M/UL (ref 4–5.4)
RBC # BLD AUTO: 4.17 M/UL (ref 4–5.4)
RBC #/AREA URNS AUTO: 10 /HPF (ref 0–4)
RBC #/AREA URNS AUTO: 4 /HPF (ref 0–4)
SAMPLE: ABNORMAL
SITE: ABNORMAL
SODIUM SERPL-SCNC: 138 MMOL/L (ref 136–145)
SODIUM SERPL-SCNC: 139 MMOL/L (ref 136–145)
SP GR UR STRIP: 1.03 (ref 1–1.03)
SP GR UR STRIP: >=1.03 (ref 1–1.03)
SPECIMEN OUTDATE: NORMAL
SQUAMOUS #/AREA URNS AUTO: 0 /HPF
SQUAMOUS #/AREA URNS AUTO: 10 /HPF
TRANS ERYTHROCYTES VOL PATIENT: NORMAL ML
TROPONIN I SERPL DL<=0.01 NG/ML-MCNC: 4.73 NG/ML (ref 0–0.03)
TROPONIN I SERPL DL<=0.01 NG/ML-MCNC: 6.09 NG/ML (ref 0–0.03)
TROPONIN I SERPL DL<=0.01 NG/ML-MCNC: 8.15 NG/ML (ref 0–0.03)
URN SPEC COLLECT METH UR: ABNORMAL
URN SPEC COLLECT METH UR: ABNORMAL
WBC # BLD AUTO: 11.78 K/UL (ref 3.9–12.7)
WBC # BLD AUTO: 11.88 K/UL (ref 3.9–12.7)
WBC #/AREA URNS AUTO: 3 /HPF (ref 0–5)
WBC #/AREA URNS AUTO: 52 /HPF (ref 0–5)
YEAST UR QL AUTO: ABNORMAL
YEAST UR QL AUTO: ABNORMAL

## 2023-08-23 PROCEDURE — 63600175 PHARM REV CODE 636 W HCPCS: Performed by: NURSE PRACTITIONER

## 2023-08-23 PROCEDURE — 83880 ASSAY OF NATRIURETIC PEPTIDE: CPT | Performed by: STUDENT IN AN ORGANIZED HEALTH CARE EDUCATION/TRAINING PROGRAM

## 2023-08-23 PROCEDURE — 25000003 PHARM REV CODE 250: Performed by: INTERNAL MEDICINE

## 2023-08-23 PROCEDURE — 25000003 PHARM REV CODE 250: Performed by: STUDENT IN AN ORGANIZED HEALTH CARE EDUCATION/TRAINING PROGRAM

## 2023-08-23 PROCEDURE — 25000003 PHARM REV CODE 250

## 2023-08-23 PROCEDURE — 99291 CRITICAL CARE FIRST HOUR: CPT | Mod: GC,,, | Performed by: INTERNAL MEDICINE

## 2023-08-23 PROCEDURE — 87086 URINE CULTURE/COLONY COUNT: CPT | Performed by: STUDENT IN AN ORGANIZED HEALTH CARE EDUCATION/TRAINING PROGRAM

## 2023-08-23 PROCEDURE — 99291 CRITICAL CARE FIRST HOUR: CPT | Mod: ,,, | Performed by: STUDENT IN AN ORGANIZED HEALTH CARE EDUCATION/TRAINING PROGRAM

## 2023-08-23 PROCEDURE — 63600175 PHARM REV CODE 636 W HCPCS

## 2023-08-23 PROCEDURE — 86900 BLOOD TYPING SEROLOGIC ABO: CPT | Performed by: INTERNAL MEDICINE

## 2023-08-23 PROCEDURE — 99233 SBSQ HOSP IP/OBS HIGH 50: CPT | Mod: GC,,, | Performed by: INTERNAL MEDICINE

## 2023-08-23 PROCEDURE — 99233 PR SUBSEQUENT HOSPITAL CARE,LEVL III: ICD-10-PCS | Mod: GC,,, | Performed by: INTERNAL MEDICINE

## 2023-08-23 PROCEDURE — 36415 COLL VENOUS BLD VENIPUNCTURE: CPT | Performed by: STUDENT IN AN ORGANIZED HEALTH CARE EDUCATION/TRAINING PROGRAM

## 2023-08-23 PROCEDURE — 63600175 PHARM REV CODE 636 W HCPCS: Performed by: STUDENT IN AN ORGANIZED HEALTH CARE EDUCATION/TRAINING PROGRAM

## 2023-08-23 PROCEDURE — C9113 INJ PANTOPRAZOLE SODIUM, VIA: HCPCS | Performed by: STUDENT IN AN ORGANIZED HEALTH CARE EDUCATION/TRAINING PROGRAM

## 2023-08-23 PROCEDURE — 85610 PROTHROMBIN TIME: CPT | Performed by: STUDENT IN AN ORGANIZED HEALTH CARE EDUCATION/TRAINING PROGRAM

## 2023-08-23 PROCEDURE — 99233 PR SUBSEQUENT HOSPITAL CARE,LEVL III: ICD-10-PCS | Mod: ,,,

## 2023-08-23 PROCEDURE — 87040 BLOOD CULTURE FOR BACTERIA: CPT | Mod: 59 | Performed by: STUDENT IN AN ORGANIZED HEALTH CARE EDUCATION/TRAINING PROGRAM

## 2023-08-23 PROCEDURE — 93005 ELECTROCARDIOGRAM TRACING: CPT

## 2023-08-23 PROCEDURE — 81001 URINALYSIS AUTO W/SCOPE: CPT | Mod: 91

## 2023-08-23 PROCEDURE — 99291 PR CRITICAL CARE, E/M 30-74 MINUTES: ICD-10-PCS | Mod: GC,,, | Performed by: INTERNAL MEDICINE

## 2023-08-23 PROCEDURE — 99900035 HC TECH TIME PER 15 MIN (STAT)

## 2023-08-23 PROCEDURE — 82140 ASSAY OF AMMONIA: CPT | Performed by: INTERNAL MEDICINE

## 2023-08-23 PROCEDURE — 86304 IMMUNOASSAY TUMOR CA 125: CPT | Performed by: STUDENT IN AN ORGANIZED HEALTH CARE EDUCATION/TRAINING PROGRAM

## 2023-08-23 PROCEDURE — 94761 N-INVAS EAR/PLS OXIMETRY MLT: CPT

## 2023-08-23 PROCEDURE — 51702 INSERT TEMP BLADDER CATH: CPT

## 2023-08-23 PROCEDURE — 63600175 PHARM REV CODE 636 W HCPCS: Performed by: EMERGENCY MEDICINE

## 2023-08-23 PROCEDURE — 27000190 HC CPAP FULL FACE MASK W/VALVE

## 2023-08-23 PROCEDURE — 99291 PR CRITICAL CARE, E/M 30-74 MINUTES: ICD-10-PCS | Mod: ,,, | Performed by: STUDENT IN AN ORGANIZED HEALTH CARE EDUCATION/TRAINING PROGRAM

## 2023-08-23 PROCEDURE — 84484 ASSAY OF TROPONIN QUANT: CPT | Mod: 91

## 2023-08-23 PROCEDURE — 93010 EKG 12-LEAD: ICD-10-PCS | Mod: ,,, | Performed by: INTERNAL MEDICINE

## 2023-08-23 PROCEDURE — 81001 URINALYSIS AUTO W/SCOPE: CPT | Performed by: STUDENT IN AN ORGANIZED HEALTH CARE EDUCATION/TRAINING PROGRAM

## 2023-08-23 PROCEDURE — 83735 ASSAY OF MAGNESIUM: CPT | Mod: 91 | Performed by: INTERNAL MEDICINE

## 2023-08-23 PROCEDURE — 99233 SBSQ HOSP IP/OBS HIGH 50: CPT | Mod: ,,,

## 2023-08-23 PROCEDURE — 85730 THROMBOPLASTIN TIME PARTIAL: CPT | Performed by: INTERNAL MEDICINE

## 2023-08-23 PROCEDURE — 94660 CPAP INITIATION&MGMT: CPT

## 2023-08-23 PROCEDURE — 85576 BLOOD PLATELET AGGREGATION: CPT | Performed by: INTERNAL MEDICINE

## 2023-08-23 PROCEDURE — 83735 ASSAY OF MAGNESIUM: CPT | Performed by: STUDENT IN AN ORGANIZED HEALTH CARE EDUCATION/TRAINING PROGRAM

## 2023-08-23 PROCEDURE — 25500020 PHARM REV CODE 255: Performed by: STUDENT IN AN ORGANIZED HEALTH CARE EDUCATION/TRAINING PROGRAM

## 2023-08-23 PROCEDURE — 80053 COMPREHEN METABOLIC PANEL: CPT

## 2023-08-23 PROCEDURE — 84484 ASSAY OF TROPONIN QUANT: CPT | Performed by: STUDENT IN AN ORGANIZED HEALTH CARE EDUCATION/TRAINING PROGRAM

## 2023-08-23 PROCEDURE — 63600175 PHARM REV CODE 636 W HCPCS: Performed by: INTERNAL MEDICINE

## 2023-08-23 PROCEDURE — 85025 COMPLETE CBC W/AUTO DIFF WBC: CPT | Mod: 91 | Performed by: STUDENT IN AN ORGANIZED HEALTH CARE EDUCATION/TRAINING PROGRAM

## 2023-08-23 PROCEDURE — 80048 BASIC METABOLIC PNL TOTAL CA: CPT | Performed by: STUDENT IN AN ORGANIZED HEALTH CARE EDUCATION/TRAINING PROGRAM

## 2023-08-23 PROCEDURE — 20000000 HC ICU ROOM

## 2023-08-23 PROCEDURE — 93010 ELECTROCARDIOGRAM REPORT: CPT | Mod: ,,, | Performed by: INTERNAL MEDICINE

## 2023-08-23 PROCEDURE — 85610 PROTHROMBIN TIME: CPT | Mod: 91 | Performed by: INTERNAL MEDICINE

## 2023-08-23 PROCEDURE — 27100171 HC OXYGEN HIGH FLOW UP TO 24 HOURS

## 2023-08-23 PROCEDURE — 83605 ASSAY OF LACTIC ACID: CPT | Performed by: STUDENT IN AN ORGANIZED HEALTH CARE EDUCATION/TRAINING PROGRAM

## 2023-08-23 RX ORDER — ENOXAPARIN SODIUM 100 MG/ML
40 INJECTION SUBCUTANEOUS EVERY 24 HOURS
Status: DISCONTINUED | OUTPATIENT
Start: 2023-08-23 | End: 2023-09-01 | Stop reason: HOSPADM

## 2023-08-23 RX ORDER — NOREPINEPHRINE BITARTRATE/D5W 4MG/250ML
0-.2 PLASTIC BAG, INJECTION (ML) INTRAVENOUS CONTINUOUS
Status: DISPENSED | OUTPATIENT
Start: 2023-08-23 | End: 2023-08-24

## 2023-08-23 RX ORDER — METRONIDAZOLE 500 MG/1
500 TABLET ORAL EVERY 8 HOURS
Status: DISCONTINUED | OUTPATIENT
Start: 2023-08-23 | End: 2023-08-23

## 2023-08-23 RX ORDER — INSULIN ASPART 100 [IU]/ML
3 INJECTION, SOLUTION INTRAVENOUS; SUBCUTANEOUS
Status: DISCONTINUED | OUTPATIENT
Start: 2023-08-23 | End: 2023-08-28

## 2023-08-23 RX ADMIN — ESCITALOPRAM OXALATE 10 MG: 10 TABLET ORAL at 08:08

## 2023-08-23 RX ADMIN — Medication 400 MG: at 08:08

## 2023-08-23 RX ADMIN — ENOXAPARIN SODIUM 40 MG: 40 INJECTION SUBCUTANEOUS at 04:08

## 2023-08-23 RX ADMIN — ASPIRIN 81 MG: 81 TABLET, COATED ORAL at 08:08

## 2023-08-23 RX ADMIN — IOHEXOL 75 ML: 350 INJECTION, SOLUTION INTRAVENOUS at 12:08

## 2023-08-23 RX ADMIN — ACETAMINOPHEN 650 MG: 650 SUPPOSITORY RECTAL at 01:08

## 2023-08-23 RX ADMIN — NOREPINEPHRINE BITARTRATE 0.02 MCG/KG/MIN: 4 INJECTION, SOLUTION INTRAVENOUS at 04:08

## 2023-08-23 RX ADMIN — INSULIN ASPART 4 UNITS: 100 INJECTION, SOLUTION INTRAVENOUS; SUBCUTANEOUS at 04:08

## 2023-08-23 RX ADMIN — INSULIN ASPART 3 UNITS: 100 INJECTION, SOLUTION INTRAVENOUS; SUBCUTANEOUS at 04:08

## 2023-08-23 RX ADMIN — SODIUM CHLORIDE, POTASSIUM CHLORIDE, SODIUM LACTATE AND CALCIUM CHLORIDE 500 ML: 600; 310; 30; 20 INJECTION, SOLUTION INTRAVENOUS at 11:08

## 2023-08-23 RX ADMIN — CEFEPIME 2 G: 2 INJECTION, POWDER, FOR SOLUTION INTRAVENOUS at 12:08

## 2023-08-23 RX ADMIN — GABAPENTIN 300 MG: 300 CAPSULE ORAL at 08:08

## 2023-08-23 RX ADMIN — SODIUM CHLORIDE, POTASSIUM CHLORIDE, SODIUM LACTATE AND CALCIUM CHLORIDE 500 ML: 600; 310; 30; 20 INJECTION, SOLUTION INTRAVENOUS at 04:08

## 2023-08-23 RX ADMIN — CLOPIDOGREL BISULFATE 75 MG: 75 TABLET ORAL at 08:08

## 2023-08-23 RX ADMIN — CEFEPIME 2 G: 2 INJECTION, POWDER, FOR SOLUTION INTRAVENOUS at 11:08

## 2023-08-23 RX ADMIN — SODIUM CHLORIDE, POTASSIUM CHLORIDE, SODIUM LACTATE AND CALCIUM CHLORIDE 500 ML: 600; 310; 30; 20 INJECTION, SOLUTION INTRAVENOUS at 03:08

## 2023-08-23 RX ADMIN — VANCOMYCIN HYDROCHLORIDE 1250 MG: 1.25 INJECTION, POWDER, LYOPHILIZED, FOR SOLUTION INTRAVENOUS at 05:08

## 2023-08-23 RX ADMIN — PANTOPRAZOLE SODIUM 40 MG: 40 INJECTION, POWDER, FOR SOLUTION INTRAVENOUS at 08:08

## 2023-08-23 RX ADMIN — INSULIN ASPART 6 UNITS: 100 INJECTION, SOLUTION INTRAVENOUS; SUBCUTANEOUS at 08:08

## 2023-08-23 RX ADMIN — ATORVASTATIN CALCIUM 40 MG: 40 TABLET, FILM COATED ORAL at 08:08

## 2023-08-23 NOTE — ASSESSMENT & PLAN NOTE
Recently admitted (6/23) for suspected biliary pancreatitis and biliary stricture treated EUS/ERCP and cholecystectomy. Hospital course associated with onset of abdominal pain and progressive hyperbilirubinemia since 08/16 with CT A/P non-contrast from that time concerning for pancreatic head mass with multiple hepatic lesions concerning for underlying pancreatic cancer. S/P ERCP and stent placement on 8/17.  Noted to have continual elevation in T. Bilirubin on 8/21 to 4.0. Repeat ERCP on 8/22 with demonstrated a visibly ocluded stent in the biliary tree with a single biliary stricture in main BD; s/p metal stent placement. Repeat T. bilrubin continually elevated now at 7.6.       --GI following; appreciated recs   --trend T. Bilirubin  --Concern repeat CT AP

## 2023-08-23 NOTE — PROGRESS NOTES
Pharmacokinetic Initial Assessment: IV Vancomycin    Assessment/Plan:    Initiate intravenous vancomycin with loading dose of 1250 mg once followed by a maintenance dose of vancomycin 750 mg IV every 12 hours.   - Ms. Huff's renal function appears stable and at baseline.   - Desired empiric serum trough concentration is 10 to 20 mcg/mL.  - Draw vancomycin trough level 60 min prior to fourth dose on 8/24 at approximately 1100.  - Please draw random level sooner than scheduled trough if renal function changes significantly.    Pharmacy will continue to follow and monitor vancomycin.      Please contact pharmacy at extension [phone] with any questions regarding this assessment.     Thank you for the consult,   Vilma Sullivan       Patient brief summary:  Mariann Huff is a 63 y.o. female initiated on antimicrobial therapy with IV Vancomycin for treatment of suspected sepsis    Actual Body Weight:   58.6 kg    Renal Function:   Estimated Creatinine Clearance: 72 mL/min (based on SCr of 0.7 mg/dL).     Dialysis Method (if applicable):  N/A

## 2023-08-23 NOTE — ANESTHESIA RELEASE NOTE
"Anesthesia Release from PACU Note    Patient: Mariann Huff    Procedure(s) Performed: Procedure(s) (LRB):  ERCP (ENDOSCOPIC RETROGRADE CHOLANGIOPANCREATOGRAPHY) (N/A)    Anesthesia type: general    Post pain: Adequate analgesia    Post assessment: no apparent anesthetic complications and tolerated procedure well    Last Vitals:   Visit Vitals  BP (!) 125/58   Pulse 84   Temp 37.1 °C (98.7 °F) (Axillary)   Resp (!) 21   Ht 5' 4" (1.626 m)   Wt 61.1 kg (134 lb 11.2 oz)   LMP  (LMP Unknown)   SpO2 99%   Breastfeeding No   BMI 23.12 kg/m²       Post vital signs: stable    Level of consciousness: awake and alert     Nausea/Vomiting: no nausea/no vomiting    Complications: none    Airway Patency: patent    Respiratory: unassisted, spontaneous ventilation, room air    Cardiovascular: stable and blood pressure at baseline    Hydration: euvolemic  "

## 2023-08-23 NOTE — RESPIRATORY THERAPY
Upon entering room to place PT on BIPAP PT's whole body was tensed up and shaking, tachycardic ('s), temp of 103.3 and drooling from left side of mouth.  Notified RN and SHANNAN Luis MD. PT went down for CT when PT returned to room placed on BIPAP.  Ambu bag and mas at bedside. Will continue to monitor.

## 2023-08-23 NOTE — ASSESSMENT & PLAN NOTE
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  Immunostains show the cells to be positive for CK7 (strong diffuse) and negative for CK20, HepPar 1, and CDX2.  Given the immunoprofile, findings of a pancreatic mass, and focal vacuole suggestive of intracellular mucin, favor adenocarcinoma, however too poorly differentiated to say definitively.    -Will consult Heme/Onc

## 2023-08-23 NOTE — NURSING
Start of shift patient was drowsy but arousable and AOx 3-4. /58 HR 85, O2 94% RA.    2315 Patient HR sustaining 120-140s sinus tachy. Lethargic, drooling from mouth, able to move extremities.  Temp 103 axillary. O2 sats 90% room air. -200 systolic. Charge nurse notified, MD in route to bedside. Patient placed on bipap. Stat CT, ABG, and labs ordered.     0200 BP trending down 80/48 Map 59. 98% RA. Drowsy but arousable. Bladder scan 652, straight cath performed with 700 output, tan/white thick discharge noted, UA ordered and collected. MD at bedside. LR bolus of 500 given, no improvement to BP. Patient transferred to MICU.

## 2023-08-23 NOTE — CARE UPDATE
RAPID RESPONSE NURSE FOLLOW-UP NOTE       Followed up with patient for proactive rounding.  SBP now 80-85. MD ordered 500ml bolus Reviewed plan of care with bedside RNTere .   Team will continue to follow.  Please call Rapid Response RN, Kacey Milton RN with any questions or concerns at [phone].

## 2023-08-23 NOTE — HPI
63 y.o. female with CAD s/p GINGER to ostial LAD in 2014, HTN, HLD, DM2, MURTAZA who presented to the ED to the ER after a near syncopal event at home around 4 pm. She also feels tired and her appetite lately has decreased. she had chest pain and visceral symptoms with nausea and vomiting. She reports losing weight as well.She has had abdominal symptoms with poor PO intake leading to orthostasis. Today she had another near syncopal event and is why she was brought to the ER. Here her ECG showed the posterior changes for which posterior EKG was obtained with showed STEMI. Bedside echo showed an EF 45-50% and norma-lateral and infero-lateral hypokinesis. She was taken to cath lab and her symptoms resolved after PCI. Please refer to PCI for full details.     Patient subsequently developed acute abdominal pain associated with nausea and vomiting. Of note, patient was recently admitted (6/23) for suspected biliary pancreatitis and biliary stricture treated with biliary sphincterotomy, biliary stent, and cholecystectomy. Given worsening transaminitis w/ elevated T-Bili, concern for stent occlusion. Patient had ERCP and EUS on 8/17/23. Visualized a partial occluded stent in the biliary tree. One covered metal stent was placed into the common bile duct. Concern for metastatic cancer is high due to multiple liver lesions and mass on pancreatic head per AES. S/p FNA biopsy with ERCP and stent placement on 8/17.      T bili continued to rise on 8/21, patient underwent ERCP again on 8/22 which demonstrated a visibly ocluded stent in the biliary tree with a single biliary stricture in main BD. One covered metal stent placed in CBD. Several hours following this procedure, patient developed rapid onset fever to 103 and rapid drop in BP from SBP 180s to SBP 80s. Patient admitted to MICU for further management.

## 2023-08-23 NOTE — ASSESSMENT & PLAN NOTE
Called as rapid overnight as patient noted to be febrile to 103, tachycardic and hypotensive with SBP of 80s. CTA chest negative for PE. Patient admitted on 8/15 for posterior STEMI s/p PCI of ramus intermedius and lateral branch with Pronto thrombectomy and 3X16 GINGER.  Patient recently had repeat ERCP on 8/22 in which she was noted to have occluded stent from the biliary tree in the major papilla and one metal stent was place. Repeat lactic acid 2.1, troponin 6. Bedside EKG shows diminished EF with collapsed IVC; 500 ml bolus given in setting of recent MI.     DDx includes septic shock s/t biliary obstruction, cardiogenic shock, hypovolemia.     - F/u blood cultures  - Continue broad spectrum abx (on cefepime and vanc)   - Vasopressors; maintain MAP >65

## 2023-08-23 NOTE — ASSESSMENT & PLAN NOTE
This is a 63 year old female with a PMH significant for CAD (status post PCI in 2014), DVT (on Apixaban), MURTAZA, pancreatitis (diagnosed 05/2023; attributed to TRULICITY usage initially; status post EUS/ERCP in 06/2023 showing gallbladder sludge and stricture in lower third of main duct suspected to be from pancreatitis with plastic stent placed into CBD; due for removal in 08/2023), peripheral artery disease (on PLETAL), and T2DM and a PSH significant for subtotal cholecystectomy (late 06/2023) who was admitted to [facility] on 08/14 for posterior STEMI requiring PCI on admission and initiation of ASA and PLAVIX. Hospital course associated with onset of abdominal pain and progressive hyperbilirubinemia since 08/16 with CT A/P non-contrast from that time concerning for pancreatic head mass with multiple hepatic lesions concerning for underlying pancreatic cancer. She had a biliary stricture treated with biliary sphincterotomy, biliary stent, and cholecystectomy s/p ERCP w/ metal stent on 8/17, abdominal pain has resolved but T bili is continuing to rise. Abdominal x-ray completed yesterday which did not show migration of the stent but today Tbili has risen again and thus we will repeat her ERCP to reassess. Overnight she became septic appearing with temp 103F, hypoxia, and tachycardia. She was transferred to MICU overnight for closer monitoring and pressors due to hypotension not responsive to IVF bolus.    Recommendations:     - Continue antibiotics for assumed biliary source  - CMP Daily - monitor Tbili and LFTs

## 2023-08-23 NOTE — ASSESSMENT & PLAN NOTE
Home Antihyperglycemic Regiment:  -Farxiga, glipizide, insulin daily at home  - Titrate and addition of insulin as needed for BG goal 140-180  - SSI with POCT accuchecks ACHS and Diabetic diet 2000 beti  - Diabetic nutritional counseling given   - Continue home gabapentin for diabetic peripheral neuropathy; will hold in setting of mental status change  - Adjust insulin and monitor closely  - Endocrine following

## 2023-08-23 NOTE — CARE UPDATE
RAPID RESPONSE NURSE FOLLOW-UP NOTE       Followed up with patient for proactive rounding.  Moving patient to ICU due to declining BP, updated through critical care team   Please call Rapid Response RN, Kacey Milton RN with any questions or concerns at [phone].

## 2023-08-23 NOTE — EICU
Intervention Initiated From:  COR / MICHAEL Smith intervened regarding:  Documentation    Nurse Notified:  No    Doctor Notified:  No    Comments: Naveon day 1 sent to spouse Shamir and two sons Ifeoma and Reji Huff.

## 2023-08-23 NOTE — SUBJECTIVE & OBJECTIVE
Past Medical History:   Diagnosis Date    Anticoagulant long-term use     Asthma     Back pain     Bradycardia, unspecified 05/08/2019    The etiology of the bradycardic episode is unclear.  The have appear to be respiratory in origin (at least the 1st episode).  We will continue to monitor carefully.  We are awaiting evaluation by Cardiology.      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, with long-term current use of insulin 01/09/2020       Past Surgical History:   Procedure Laterality Date    ANGIOGRAM, CORONARY, WITH LEFT HEART CATHETERIZATION N/A 8/3/2023    Procedure: Angiogram, Coronary, with Left Heart Cath;  Surgeon: Aime George MD;  Location: [facility] CATH LAB;  Service: Cardiology;  Laterality: N/A;    ANGIOGRAM, CORONARY, WITH LEFT HEART CATHETERIZATION N/A 8/14/2023    Procedure: Angiogram, Coronary, with Left Heart Cath;  Surgeon: Wilman Kim MD;  Location: [facility] CATH LAB;  Service: Cardiology;  Laterality: N/A;    ANTEGRADE NEPHROSTOGRAPHY Left 12/11/2019    Procedure: Nephrostogram - antegrade;  Surgeon: Robin Boyd MD;  Location: [facility] OR [address] [place] [address];  Service: Urology;  Laterality: Left;    BRONCHOSCOPY N/A 5/2/2019    Procedure: BRONCHOSCOPY;  Surgeon: Sean Ruano MD;  Location: [facility] OR [address] [place] [address];  Service: General;  Laterality: N/A;    CARDIAC CATHETERIZATION       CATARACT EXTRACTION      cataract extraction left eye      cataracts      CAUDAL EPIDURAL STEROID INJECTION N/A 2019    Procedure: Injection-steroid-epidural-caudal;  Surgeon: Dave Bentley Jr., MD;  Location: [facility] PAIN MGT;  Service: Pain Management;  Laterality: N/A;     SECTION, LOW TRANSVERSE      COLONOSCOPY N/A 2019    Procedure: COLONOSCOPY;  Surgeon: Al Alaniz MD;  Location: [facility] ENDO (2ND FLR);  Service: Endoscopy;  Laterality: N/A;    CORONARY ANGIOPLASTY      CYSTOGRAM N/A 2019    Procedure: CYSTOGRAM INTRAOP;  Surgeon: Robin Boyd MD;  [facility];  Service: Urology;  Laterality: N/A;  1 HOUR    CYSTOSCOPY W/ RETROGRADES Left 2019    Procedure: CYSTOSCOPY, WITH RETROGRADE PYELOGRAM;  Surgeon: Robin Boyd MD;  [facility];  Service: Urology;  Laterality: Left;  fluro    ENDOSCOPIC ULTRASOUND OF UPPER GASTROINTESTINAL TRACT N/A 6/15/2023    Procedure: ULTRASOUND, UPPER GI TRACT, ENDOSCOPIC;  Surgeon: Lisa Kim MD;  Location: [facility] (2ND FLR);  Service: Endoscopy;  Laterality: N/A;    ENDOSCOPIC ULTRASOUND OF UPPER GASTROINTESTINAL TRACT  2023    Procedure: ULTRASOUND, UPPER GI TRACT, ENDOSCOPIC;  Surgeon: Wayne Adamson MD;  Location: [facility] ENDO (2ND FLR);  Service: Endoscopy;;    ERCP N/A 6/15/2023    Procedure: ERCP (ENDOSCOPIC RETROGRADE CHOLANGIOPANCREATOGRAPHY);  Surgeon: Lisa Kim MD;  Location: [facility] ENDO (2ND FLR);  Service: Endoscopy;  Laterality: N/A;    ERCP N/A 2023    Procedure: ERCP (ENDOSCOPIC RETROGRADE CHOLANGIOPANCREATOGRAPHY);  Surgeon: Wayne Adamson MD;  Location: [facility] ENDO (2ND FLR);  Service: Endoscopy;  Laterality: N/A;  eliquis and pletal ok to hold-see te 23-dr [id number] portal    ERCP N/A 2023    Procedure: ERCP (ENDOSCOPIC RETROGRADE CHOLANGIOPANCREATOGRAPHY);  Surgeon: Wayne Adamson MD;  Location: [facility] ENDO (2ND FLR);  Service: Endoscopy;  Laterality: N/A;     ERCP N/A 8/22/2023    Procedure: ERCP (ENDOSCOPIC RETROGRADE CHOLANGIOPANCREATOGRAPHY);  Surgeon: Christian Huff MD;  Location: [facility] ENDO (2ND FLR);  Service: Endoscopy;  Laterality: N/A;    ESOPHAGOGASTRODUODENOSCOPY W/ PEG  5/2/2019    Procedure: EGD, WITH PEG TUBE INSERTION;  Surgeon: Sean Ruano MD;  Location: [facility] OR 2ND FLR;  Service: General;;    EXCISION TURBINATE, SUBMUCOUS      FLEXIBLE SIGMOIDOSCOPY N/A 5/13/2019    Procedure: SIGMOIDOSCOPY, FLEXIBLE;  Surgeon: ALBERTO Amin MD;  Location: [facility] ENDO (2ND FLR);  Service: Endoscopy;  Laterality: N/A;    FLEXIBLE SIGMOIDOSCOPY N/A 5/21/2019    Procedure: SIGMOIDOSCOPY, FLEXIBLE;  Surgeon: ALBERTO Amin MD;  Location: [facility] ENDO (2ND FLR);  Service: Endoscopy;  Laterality: N/A;    FUSION OF LUMBAR SPINE BY ANTERIOR APPROACH Left 4/12/2019    Procedure: FUSION, SPINE, LUMBAR, ANTERIOR APPROACH Left L5-S1 Anterior to Psoa Interbody Fusion, L5-S1 Posterior Instrumentation;  Surgeon: Mk George MD;  Location: [facility] OR 2ND FLR;  Service: Neurosurgery;  Laterality: Left;  Porcedure:  Left L5-S1 Anterior to Psoa Interbody Fusion, L5-S1 Posterior Instrumentation  Surgery Time: 4 Hrs  LOS: 2-3  Anesthesia: General   Blood: Type & Screen  R    HAND SURGERY Left     HAND SURGERY Right     torn ligament repair/ Dr. Yeboah    HYSTERECTOMY      INJECTION OF STEROID Right 12/10/2020    Procedure: INJECTION, STEROID Right SI Joint Block and Steroid Injection;  Surgeon: Mk George MD;  Location: [facility] OR;  Service: Neurosurgery;  Laterality: Right;  Procedure: Right SI Joint Block and Steroid Injection   SUrgery Time: 30 Min  LOS: 0  Anesthesia: MAC  Radiology: C-arm  Bed: [place] [id number] Poster  Position: Prone    INJECTION OF STEROID Right 9/28/2021    Procedure: INJECTION, STEROID Right SI joint block & steroid injection;  Surgeon: Mk George MD;  Location: [facility];  Service: Neurosurgery;  Laterality: Right;  Procedure: Right SI joint block &  steroid injection  Surgery Time: 30m  Anesthesia: Local MAC  Radiology: C-arm  Bed: Regular  Position: Prone    IVUS, CORONARY  8/14/2023    Procedure: IVUS, Coronary;  Surgeon: Wilman Kim MD;  Location: [facility] CATH LAB;  Service: Cardiology;;    LAPAROSCOPIC CHOLECYSTECTOMY N/A 6/23/2023    Procedure: CHOLECYSTECTOMY, LAPAROSCOPIC;  Surgeon: Richard Penny MD;  Location: [address];  Service: General;  Laterality: N/A;    left foot surgery      left wrist surgery      LYSIS OF ADHESIONS N/A 2/19/2020    Procedure: LYSIS, ADHESIONS;  Surgeon: Robin Boyd MD;  Location: [address];  Service: Urology;  Laterality: N/A;    NASAL SEPTUM SURGERY  5/7/15    OPEN REDUCTION AND INTERNAL FIXATION (ORIF) OF FRACTURE OF PROXIMAL HUMERUS Left 7/12/2023    Procedure: ORIF, FRACTURE, HUMERUS, PROXIMAL ;  Surgeon: Tomasz Leary MD;  Location: [address];  Service: Orthopedics;  Laterality: Left;    PERCUTANEOUS NEPHROSTOMY Left 4/21/2019    Procedure: Creation, Nephrostomy, Percutaneous;  Surgeon: Karina Surgeon;  Location: [facility];  Service: Anesthesiology;  Laterality: Left;    REPAIR OF URETER  4/12/2019    Procedure: REPAIR, URETER;  Surgeon: Mk George MD;  Location: [address];  Service: Neurosurgery;;    REPLACEMENT OF NEPHROSTOMY TUBE N/A 7/18/2019    Procedure: REPLACEMENT, NEPHROSTOMY TUBE;  Surgeon: [facility] Diagnostic Provider;  Location: [address];  Service: Anesthesiology;  Laterality: N/A;  188    REPLACEMENT OF NEPHROSTOMY TUBE N/A 7/24/2019    Procedure: REPLACEMENT, NEPHROSTOMY TUBE;  Surgeon: [facility] Diagnostic Provider;  Location: [address];  Service: Anesthesiology;  Laterality: N/A;  188    REPLACEMENT OF NEPHROSTOMY TUBE N/A 10/7/2019    Procedure: REPLACEMENT, NEPHROSTOMY TUBE;  Surgeon: [facility] Diagnostic Provider;  Location: [address];  Service: Anesthesiology;  Laterality: N/A;  189    REPLACEMENT OF NEPHROSTOMY TUBE N/A 11/25/2019    Procedure:  REPLACEMENT, NEPHROSTOMY TUBE;  Surgeon: [place] Diagnostic Provider;  [address];  Service: Anesthesiology;  Laterality: N/A;  Room 188/Bessy    REPLACEMENT OF NEPHROSTOMY TUBE Right 2/19/2020    Procedure: REPLACEMENT, NEPHROSTOMY TUBE removal removal;  Surgeon: Robin Boyd MD;  [address];  Service: Urology;  Laterality: Right;    RIGHT HEART CATHETERIZATION Right 8/3/2023    Procedure: INSERTION, CATHETER, RIGHT HEART;  Surgeon: Aime George MD;  Location: [facility] CATH LAB;  Service: Cardiology;  Laterality: Right;    rt elbow surgery      S/P LAD COATED STENT  05/14/2010    6 total     S/P OM1 STENT  08/2003    SINUS SURGERY      F.E.S.S.    STENT, DRUG ELUTING, SINGLE VESSEL, CORONARY  8/14/2023    Procedure: Stent, Drug Eluting, Single Vessel, Coronary;  Surgeon: Wilman Kim MD;  Location: [facility] CATH LAB;  Service: Cardiology;;    TRACHEOSTOMY N/A 5/2/2019    Procedure: CREATION, TRACHEOSTOMY;  Surgeon: Sean Ruano MD;  Location: [address];  Service: General;  Laterality: N/A;    TUBAL LIGATION      URETERAL REIMPLANTION Left 2/19/2020    Procedure: REIMPLANTATION, URETER WITH PSOAS HITCH;  Surgeon: Robin Boyd MD;  Location: [address];  Service: Urology;  Laterality: Left;    VENTRICULOGRAM, LEFT  8/3/2023    Procedure: Ventriculogram, Left;  Surgeon: Aime George MD;  Location: [facility] CATH LAB;  Service: Cardiology;;       Review of patient's allergies indicates:   Allergen Reactions    Milk containing products (dairy)      Causes GI distress       Current Facility-Administered Medications on File Prior to Encounter   Medication    0.9%  NaCl infusion    fentaNYL 50 mcg/mL injection  mcg    midazolam (VERSED) 1 mg/mL injection 0.5-4 mg     Current Outpatient Medications on File Prior to Encounter   Medication Sig    ACCU-CHEK EDIN PLUS METER Misc     acetaminophen (TYLENOL) 650 MG TbSR Take 1 tablet (650 mg total) by mouth every 8  (eight) hours. (Patient not taking: Reported on 8/10/2023)    albuterol (PROVENTIL/VENTOLIN HFA) 90 mcg/actuation inhaler Inhale 2 puffs into the lungs every 6 (six) hours as needed for Wheezing. (Patient not taking: Reported on 8/10/2023)    apixaban (ELIQUIS) 5 mg Tab Take 1 tablet (5 mg total) by mouth 2 (two) times daily.    atorvastatin (LIPITOR) 40 MG tablet Take 1 tablet (40 mg total) by mouth every evening.    blood sugar diagnostic (ACCU-CHEK EDIN PLUS TEST STRP) Strp TEST BLOOD SUGARS 4 TIMES DAILY    cefpodoxime (VANTIN) 100 MG tablet Take 4 tablets (400 mg total) by mouth every 12 (twelve) hours. for 21 days    dapagliflozin (FARXIGA) 10 mg tablet Take 1 tablet (10 mg total) by mouth once daily. (Patient not taking: Reported on 7/10/2023)    diclofenac sodium (VOLTAREN) 1 % Gel Apply 2 g topically 3 (three) times daily. Apply to the area of pain 2-3x per day or night as needed (Patient not taking: Reported on 7/10/2023)    EScitalopram oxalate (LEXAPRO) 10 MG tablet Take 1 tablet (10 mg total) by mouth once daily.    furosemide (LASIX) 40 MG tablet Take 1 tablet (40 mg total) by mouth once daily.    gabapentin (NEURONTIN) 300 MG capsule Take 1 capsule (300 mg) in the morning and 2 capsules (600 mg) in the evening    glipiZIDE (GLUCOTROL) 10 MG TR24 Take 1 tablet (10 mg total) by mouth daily with breakfast. (Patient not taking: Reported on 8/10/2023)    insulin detemir U-100, Levemir, (LEVEMIR FLEXPEN) 100 unit/mL (3 mL) InPn pen Inject 14 Units into the skin every evening. (Patient taking differently: Inject into the skin every evening. Adjusted dose based on glucose)    losartan (COZAAR) 25 MG tablet Take 1 tablet (25 mg total) by mouth once daily.    magnesium oxide (MAG-OX) 400 mg (241.3 mg magnesium) tablet Take 1 tablet (400 mg total) by mouth 2 (two) times daily.    metoprolol succinate (TOPROL-XL) 25 MG 24 hr tablet Take 0.5 tablets (12.5 mg total) by mouth once daily.    multivitamin (THERAGRAN)  "per tablet Take 1 tablet by mouth once daily. (Patient not taking: Reported on 8/10/2023)    oxyCODONE (ROXICODONE) 5 MG immediate release tablet Take 1 tablet (5 mg total) by mouth every 4 (four) hours as needed for Pain. (Patient not taking: Reported on 8/10/2023)    pen needle, diabetic (NOVOTWIST) 32 gauge x 1/5" Ndle Use 1 needle to inject insulin four times daily    pen needle, diabetic 32 gauge x 5/32" Ndle Use as directed    spironolactone (ALDACTONE) 25 MG tablet Take 0.5 tablets (12.5 mg total) by mouth once daily.    ticagrelor (BRILINTA) 90 mg tablet Take 1 tablet (90 mg total) by mouth 2 (two) times daily.     Family History       Problem Relation (Age of Onset)    Diabetes Mother, Father, Sister, Brother, Sister    Heart attack Father    Heart disease Mother    Leukemia Father    No Known Problems Sister, Brother, Brother, Maternal Grandmother, Maternal Grandfather, Paternal Grandmother, Paternal Grandfather, Son, Son, Maternal Aunt, Maternal Uncle, Paternal Aunt, Paternal Uncle          Tobacco Use    Smoking status: Never    Smokeless tobacco: Never   Substance and Sexual Activity    Alcohol use: No     Alcohol/week: 0.0 standard drinks of alcohol    Drug use: No    Sexual activity: Yes     Partners: Male     Birth control/protection: Post-menopausal     Comment:      Review of Systems   Constitutional: Positive for decreased appetite and malaise/fatigue. Negative for chills and fever.   HENT: Negative.     Cardiovascular:  Negative for chest pain, leg swelling and palpitations.   Respiratory: Negative.  Negative for cough and shortness of breath.    Musculoskeletal: Negative.    Gastrointestinal:  Negative for abdominal pain, nausea and vomiting.   Genitourinary: Negative.    Neurological: Negative.    Psychiatric/Behavioral:  Negative for altered mental status. The patient is not nervous/anxious.      Objective:     Vital Signs (Most Recent):  Temp: 96.8 °F (36 °C) (08/23/23 1105)  Pulse: 80 " (08/23/23 1405)  Resp: 20 (08/23/23 1405)  BP: (!) 119/56 (08/23/23 1405)  SpO2: 99 % (08/23/23 1405) Vital Signs (24h Range):  Temp:  [96.8 °F (36 °C)-103.1 °F (39.5 °C)] 96.8 °F (36 °C)  Pulse:  [] 80  Resp:  [15-29] 20  SpO2:  [94 %-100 %] 99 %  BP: ()/() 119/56     Weight: 61.1 kg (134 lb 11.2 oz)  Body mass index is 23.12 kg/m².    SpO2: 99 %         Intake/Output Summary (Last 24 hours) at 8/23/2023 1506  Last data filed at 8/23/2023 1305  Gross per 24 hour   Intake 2539.44 ml   Output 840 ml   Net 1699.44 ml       Lines/Drains/Airways       Drain  Duration                  Urethral Catheter 08/23/23 1338 16 Fr. <1 day              Peripheral Intravenous Line  Duration                  Peripheral IV - Single Lumen 08/16/23 0934 20 G;1 3/4 in Left Upper Arm 7 days         Peripheral IV - Single Lumen 08/19/23 1440 18 G;2 in Anterior;Right Upper Arm 4 days                     Physical Exam  Vitals and nursing note reviewed.   Constitutional:       General: She is not in acute distress.     Appearance: She is not toxic-appearing or diaphoretic.   HENT:      Head: Normocephalic and atraumatic.      Mouth/Throat:      Mouth: Mucous membranes are moist.      Pharynx: Oropharynx is clear.   Eyes:      Extraocular Movements: Extraocular movements intact.      Conjunctiva/sclera: Conjunctivae normal.   Cardiovascular:      Rate and Rhythm: Regular rhythm. Tachycardia present.      Heart sounds: No murmur heard.     No gallop.   Pulmonary:      Effort: Pulmonary effort is normal. No respiratory distress.      Breath sounds: No wheezing.   Abdominal:      General: There is no distension.      Palpations: Abdomen is soft.      Tenderness: There is generalized abdominal tenderness. There is no guarding.   Musculoskeletal:         General: No tenderness. Normal range of motion.      Cervical back: Normal range of motion and neck supple.      Right lower leg: No edema.      Left lower leg: No edema.    Skin:     General: Skin is warm and dry.   Neurological:      Mental Status: She is alert and oriented to person, place, and time. Mental status is at baseline.      Cranial Nerves: No dysarthria.   Psychiatric:         Mood and Affect: Mood normal.         Behavior: Behavior normal.       Significant Labs: CMP   Recent Labs   Lab 08/22/23  0513 08/23/23  0104 08/23/23  0201    139 138   K 4.0 4.2 3.8    104 106   CO2 24 19* 22*   * 119* 134*   BUN 9 10 12   CREATININE 0.7 0.8 0.9   CALCIUM 8.2* 8.6* 8.5*   PROT 6.4  --  6.6   ALBUMIN 1.4*  --  1.5*   BILITOT 6.1*  --  7.6*   ALKPHOS 653*  --  743*   *  --  172*   ALT 80*  --  90*   ANIONGAP 9 16 10   , Troponin   Recent Labs   Lab 08/23/23  0201 08/23/23  0410   TROPONINI 6.090* 8.147*   , and All pertinent lab results from the last 24 hours have been reviewed.    Significant Imaging: EKG: sinus rhythm, TWI aVL and I (unchanged from 8/19 ecg)

## 2023-08-23 NOTE — PT/OT/SLP DISCHARGE
Physical Therapy Discharge Summary    Name: Mariann Huff  MRN: [id number]   Principal Problem: Shock     Patient Discharged from acute Physical Therapy on 23.  Please refer to prior PT noted date on 23 for functional status.     Assessment:     Patient transferred to higher level of care secondary to hypotension.    Objective:     GOALS:   Multidisciplinary Problems       Physical Therapy Goals          Problem: Physical Therapy    Goal Priority Disciplines Outcome Goal Variances Interventions   Physical Therapy Goal     PT, PT/OT Ongoing, Progressing     Description: Goals to be met by: 23     Patient will increase functional independence with mobility by performin. Supine to sit with [place]  2. Sit to stand transfer with Supervision  3. Bed to chair transfer with Supervision using LRAD  4. Gait  x 75 feet with Stand-by Assistance using LRAD  5. Ascend/Descend 6 inch curb step with Stand-by Assistance with/without LRAD  6. Lower extremity exercise program x30 reps per handout, with independence                         Reasons for Discontinuation of Therapy Services  Transfer to alternate level of care.      Plan:     Patient Discharged to: transferred to ICU 2/2 hypotension. Will need new PT orders.      2023

## 2023-08-23 NOTE — CARE UPDATE
BG goal 140-180    -A1C   Lab Results   Component Value Date    HGBA1C 8.8 (H) 07/10/2023       -HOME REGIMEN: Glipizide ER 10mg before breakfast. Levemir 2u daily on a sliding scale     -INPATIENT REGIMEN: Novolog Correction Scale     -GLUCOSE TREND FOR THE PAST 24HRS: 100-288    -NO HYPOGYCEMIAS NOTED     -TOLERATING 75% OF PO DIET     -Diet: Diet diabetic Low Sodium,2gm; 2000 Calorie; Fluid - 2000mL      -Steroids -  N/A     -Tube Feeds - N/A       Remains in CSU. NAEON. BG trending up above goal ranges on current prn correction scale.       Plan:  -Start Novolog 3 units TID with meals (0.3 u/kg dosing)   -Continue Moderate Dose Correction Scale  -BG monitoring ac/hs    Discharge planning: TBD    Endocrine to continue to follow    ** Please call Endocrine for any BG related issues **

## 2023-08-23 NOTE — PT/OT/SLP DISCHARGE
Occupational Therapy Discharge Summary    Mariann Huff  MRN: [id number]   Principal Problem: Shock      Patient Discharged from acute Occupational Therapy on 8/23/2023  .  Please refer to prior OT note dated 8/21/23 for functional status.    Assessment:      Patient appropriate for care in another setting.    Objective:     GOALS:   Multidisciplinary Problems       Occupational Therapy Goals          Problem: Occupational Therapy    Goal Priority Disciplines Outcome Interventions   Occupational Therapy Goal     OT, PT/OT Ongoing, Progressing    Description: Goals to be met by: 9/17/23 (1 month)     Patient will increase functional independence with ADLs by performing:    UE Dressing with Supervision.  LE Dressing with Supervision.  Grooming while standing at sink with Modified [place].  Toileting from toilet with Modified [place] for hygiene and clothing management.   Step transfer with Supervision  Toilet transfer to toilet with Stand-by Assistance.                         Reasons for Discontinuation of Therapy Services  Transfer to alternate level of care.      Plan:     Patient Discharged to:  Pt was t/f to ICU with hypotension and initial OT orders no longer valid. OT will d/c orders and await new orders prior to continued therapy    8/23/2023

## 2023-08-23 NOTE — PLAN OF CARE
MICU DAILY GOALS     Family/Goals of care/Code Status   Code Status: Full Code    24H Vital Sign Range  Temp:  [96.8 °F (36 °C)-103.1 °F (39.5 °C)]   Pulse:  []   Resp:  [15-29]   BP: ()/()   SpO2:  [94 %-100 %]      Shift Events   Liver malignancy communicated with family per primary team today.     AWAKE RASS: Goal - RASS Goal: 0-->alert and calm  Actual - RASS ([place] Agitation-Sedation Scale): drowsy    Restraint necessity: Not necessary   BREATHE SBT: Not intubated    Coordinate A & B, analgesics/sedatives Pain:  n/a    SAT: Not intubated   Delirium CAM-ICU: Overall CAM-ICU: Negative   Early(intubated/ Progressive (non-intubated) Mobility MOVE Screen: Pass   Activity: Activity Management: Arm raise - L1, Rolling - L1   Feeding/Nutrition Diet order: Diet/Nutrition Received: 2 gram sodium, consistent carb/diabetic diet,     Thrombus DVT prophylaxis: VTE Required Core Measure: Pharmacological prophylaxis initiated/maintained   HOB Elevation Head of Bed (HOB) Positioning: HOB at 30-45 degrees   Ulcer Prophylaxis GI: yes   Glucose control managed Glycemic Management: blood glucose monitored   Skin Skin assessed during daily assessment. 0 new skin concerns noted.   Bowel Function no issues    Indwelling Catheter Necessity      Urethral Catheter 08/23/23 1338 16 Fr.-Reason for Continuing Urinary Catheterization: Chronic Indwelling Urinary Catheter on Admission          De-escalation Antibiotics No       VS and assessment per flow sheet, patient progressing towards goals as tolerated, plan of care reviewed with family, all concerns addressed, will continue to monitor.

## 2023-08-23 NOTE — ASSESSMENT & PLAN NOTE
Presented on admission with posterior STEMI s/p  PCI of ramus intermedius and lateral branch with Pronto thrombectomy and 3X16 GINGER. TTE shows ejection fraction of about 40% associated with grade II diastolic dysfunction.      Plan  - Continue aspirin 81 mg daily, stop after 1 month to avoid triple therapy  - Continue high intensity statin therapy (LDL goal < 70)  - Continue ASA/plavix

## 2023-08-23 NOTE — ASSESSMENT & PLAN NOTE
Noted to have uptrending troponin from 1 on 8/14 to now 6.    --trend troponin  --will consult cardiology given recent hx of STEMI and presentation of shock

## 2023-08-23 NOTE — ASSESSMENT & PLAN NOTE
63 yoF w/ CAD s/p GINGER to ostial LAD 2014 and more recent posterior STEMI s/p [facility] 8/3 without intervention and 8/14 w/ thrombectomy and GINGER to ramus intermedius and noted mild ISR of previous LAD stents presenting w/ up-trending troponin in the setting of worsening septic shock secondary to recurrent cholangitis 2/2 biliary obstruction from newly diagnosed poorly differentiated carcinoma. Patient without chest pain. ECG with unchanged previously noted T-wave inversions in leads aVL and I, otherwise no new ischemic changes. Overall suspect type II NSTEMI in the setting of septic shock. However, worsening in-stent stenosis not excluded. Would recommend repeat TTE to assess of new regional wall motion abnormalities. Agree with continued DAPT, would hold on heparin gtt given likely type II NSTEMI.    -- Obtain updated TTE  -- Continue Aspirin and Clopidogrel for now  -- Recommend Clopidogrel response test (given in-stent restenosis through clopidogrel)   -- Consider switching to alternative second antiplatelet if no response to clopidogrel  -- Trend troponin to peak  -- STAT ECG for chest pain

## 2023-08-23 NOTE — CONSULTS
[facility] Card[facility]  Cardiology  Consult Note    Patient Name: Mariann Huff  MRN: [id number]  Admission Date: 8/14/2023  Hospital Length of Stay: 9 days  Code Status: Full Code   Attending Provider: Sean Shahid MD   Consulting Provider: Richard Lan MD  Primary Care Physician: Jasbir Haney MD  Principal Problem:Shock    Patient information was obtained from patient and past medical records.     Inpatient consult to Cardiology  Consult performed by: Richard Lan MD  Consult ordered by: Colin Allen MD        Subjective:     HPI:   Mariann Huff is a 63 y.o. female with CAD (s/p GINGER to ostial LAD in 2014 and [facility] 8/14 w/ ramus intermedius thombectomy and GINGER w/ noted mild in-stent-restenosis of previous LAD stent), HFpEF, HTN, HLD, DM2, MURTAZA who presented on 8/14 with near-syncope, decreased PO intake, nausea, and vomiting. Her ECG showed the posterior changes for which posterior EKG was obtained with showed STEMI. Bedside echo showed an EF 45-50% and norma-lateral and infero-lateral hypokinesis. She was taken to cath lab and her symptoms resolved after PCI.    Her course has been complicated by multiple episodes of acute cholangitis w/ multiple biliary stents placed and subsequently obstructed with the most recent ERCP on 8/21 w/ placement if new biliary stent. The biliary obstruction is likely secondary to metastatic pancreatic mass which was biopsied 8/21 w/ poorly differentiated carcinoma. On 8/22 she decompensated requiring norepinephrine. Her troponin was elevated to 6 -> 8. ECG with sinus rhythm w/ unchanged T-wave inversions in leads 1, aVL. Cardiology consulted for evaluation of NSTEMI.    Previous cardiac history:  [facility] 8/14/23    Successful primary PCI of ramus intermedius and lateral branch with Pronto thrombectomy and 3X16 GINGER    IVUS utilized for stent sizing    Mild ISR of LAD stents    The pre-procedure left ventricular end diastolic pressure was 12.    [facility] 8/3/23 (For NSTEMI  with Trop up to 29)    The Ramus lesion was 70% stenosed.    The Ost Cx to Prox Cx lesion was 99% stenosed.    The ejection fraction was calculated to be 45%.    The pre-procedure left ventricular end diastolic pressure was 15.    The estimated blood loss was none.    Echo 8/3/23    Left Ventricle: The left ventricle is normal in size. Ventricular mass is normal. Normal wall thickness. regional wall motion abnormalities present. There is mildly reduced systolic function with a visually estimated ejection fraction of 40 - 45%.    Right Ventricle: Normal right ventricular cavity size. Systolic function is normal.    Mitral Valve: There is mild regurgitation with a centrally directed jet.    IVC/SVC: Normal venous pressure at 3 mmHg.    No LV thrombus seen, wall motion abnormalities appear to be in LCx distribution.    Echo 8/15/23    Left Ventricle: The left ventricle is normal in size. Ventricular mass is normal. Normal wall thickness. regional wall motion abnormalities present. See diagram for wall motion findings. There is moderately reduced systolic function. Ejection fraction by visual approximation is 40%. Grade II diastolic dysfunction.    Left Atrium: Left atrium is mildly dilated. The left atrium volume index is 35.5 mL/m2.    Right Ventricle: Normal right ventricular cavity size. Wall thickness is normal. Right ventricle wall motion  is normal. Systolic function is normal.    Mitral Valve: There is mild regurgitation.    IVC/SVC: Normal venous pressure at 3 mmHg.      Past Medical History:   Diagnosis Date    Anticoagulant long-term use     Asthma     Back pain     Bradycardia, unspecified 05/08/2019    The etiology of the bradycardic episode is unclear.  The have appear to be respiratory in origin (at least the 1st episode).  We will continue to monitor carefully.  We are awaiting evaluation by Cardiology.     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, with long-term current use of insulin 2020       Past Surgical History:   Procedure Laterality Date    ANGIOGRAM, CORONARY, WITH LEFT HEART CATHETERIZATION N/A 8/3/2023    Procedure: Angiogram, Coronary, with Left Heart Cath;  Surgeon: Aime George MD;  Location: [facility] CATH LAB;  Service: Cardiology;  Laterality: N/A;    ANGIOGRAM, CORONARY, WITH LEFT HEART CATHETERIZATION N/A 2023    Procedure: Angiogram, Coronary, with Left Heart Cath;  Surgeon: Wilman Kim MD;  Location: [facility] CATH LAB;  Service: Cardiology;  Laterality: N/A;    ANTEGRADE NEPHROSTOGRAPHY Left 2019    Procedure: Nephrostogram - antegrade;  Surgeon: Robin Boyd MD;  Location: [facility] OR [address] [place] [address];  Service: Urology;  Laterality: Left;    BRONCHOSCOPY N/A 2019    Procedure: BRONCHOSCOPY;  Surgeon: Sean Ruano MD;  [facility];  Service: General;  Laterality: N/A;    CARDIAC CATHETERIZATION      CATARACT EXTRACTION      cataract extraction left eye      cataracts      CAUDAL EPIDURAL STEROID INJECTION N/A 2019    Procedure: Injection-steroid-epidural-caudal;  Surgeon: Dave Bentley Jr., MD;  Location: [facility] PAIN MGT;  Service: Pain Management;  Laterality: N/A;     SECTION, LOW TRANSVERSE      COLONOSCOPY N/A 2019    Procedure: COLONOSCOPY;  Surgeon: Al Alaniz  MD;  Location: [facility] ENDO (2ND FLR);  Service: Endoscopy;  Laterality: N/A;    CORONARY ANGIOPLASTY      CYSTOGRAM N/A 12/11/2019    Procedure: CYSTOGRAM INTRAOP;  Surgeon: Robin Boyd MD;  Location: [facility] OR 2ND FLR;  Service: Urology;  Laterality: N/A;  1 HOUR    CYSTOSCOPY W/ RETROGRADES Left 12/11/2019    Procedure: CYSTOSCOPY, WITH RETROGRADE PYELOGRAM;  Surgeon: Robin Boyd MD;  Location: [facility] OR 2ND FLR;  Service: Urology;  Laterality: Left;  fluro    ENDOSCOPIC ULTRASOUND OF UPPER GASTROINTESTINAL TRACT N/A 6/15/2023    Procedure: ULTRASOUND, UPPER GI TRACT, ENDOSCOPIC;  Surgeon: Lisa Kim MD;  Location: [facility] ENDO (2ND FLR);  Service: Endoscopy;  Laterality: N/A;    ENDOSCOPIC ULTRASOUND OF UPPER GASTROINTESTINAL TRACT  8/17/2023    Procedure: ULTRASOUND, UPPER GI TRACT, ENDOSCOPIC;  Surgeon: Wayne Adamson MD;  Location: [facility] ENDO (2ND FLR);  Service: Endoscopy;;    ERCP N/A 6/15/2023    Procedure: ERCP (ENDOSCOPIC RETROGRADE CHOLANGIOPANCREATOGRAPHY);  Surgeon: Lisa Kim MD;  Location: [facility] ENDO (2ND FLR);  Service: Endoscopy;  Laterality: N/A;    ERCP N/A 8/17/2023    Procedure: ERCP (ENDOSCOPIC RETROGRADE CHOLANGIOPANCREATOGRAPHY);  Surgeon: Wayne Adamson MD;  Location: [facility] ENDO (2ND FLR);  Service: Endoscopy;  Laterality: N/A;  eliquis and pletal ok to hospitals-see  7/26/23-dr [id number] portal    ERCP N/A 8/17/2023    Procedure: ERCP (ENDOSCOPIC RETROGRADE CHOLANGIOPANCREATOGRAPHY);  Surgeon: Wayne Adamson MD;  Location: [facility] ENDO (2ND FLR);  Service: Endoscopy;  Laterality: N/A;    ERCP N/A 8/22/2023    Procedure: ERCP (ENDOSCOPIC RETROGRADE CHOLANGIOPANCREATOGRAPHY);  Surgeon: Christian Huff MD;  Location: [facility] ENDO (2ND FLR);  Service: Endoscopy;  Laterality: N/A;    ESOPHAGOGASTRODUODENOSCOPY W/ PEG  5/2/2019    Procedure: EGD, WITH PEG TUBE INSERTION;  Surgeon: Sean Ruano MD;  Location: [facility] OR [address] [place] [address];  Service: General;;    EXCISION TURBINATE,  SUBMUCOUS      FLEXIBLE SIGMOIDOSCOPY N/A 5/13/2019    Procedure: SIGMOIDOSCOPY, FLEXIBLE;  Surgeon: ALBERTO Amin MD;  Location: [facility] ENDO (2ND FLR);  Service: Endoscopy;  Laterality: N/A;    FLEXIBLE SIGMOIDOSCOPY N/A 5/21/2019    Procedure: SIGMOIDOSCOPY, FLEXIBLE;  Surgeon: ALBERTO Amin MD;  Location: [facility] ENDO (2ND FLR);  Service: Endoscopy;  Laterality: N/A;    FUSION OF LUMBAR SPINE BY ANTERIOR APPROACH Left 4/12/2019    Procedure: FUSION, SPINE, LUMBAR, ANTERIOR APPROACH Left L5-S1 Anterior to Psoa Interbody Fusion, L5-S1 Posterior Instrumentation;  Surgeon: Mk George MD;  Location: [facility] 2ND FLR;  Service: Neurosurgery;  Laterality: Left;  Porcedure:  Left L5-S1 Anterior to Psoa Interbody Fusion, L5-S1 Posterior Instrumentation  Surgery Time: 4 Hrs  LOS: 2-3  Anesthesia: General   Blood: Type & Screen  R    HAND SURGERY Left     HAND SURGERY Right     torn ligament repair/ Dr. Yeboah    HYSTERECTOMY      INJECTION OF STEROID Right 12/10/2020    Procedure: INJECTION, STEROID Right SI Joint Block and Steroid Injection;  Surgeon: Mk George MD;  Location: [facility] OR;  Service: Neurosurgery;  Laterality: Right;  Procedure: Right SI Joint Block and Steroid Injection   SUrgery Time: 30 Min  LOS: 0  Anesthesia: MAC  Radiology: C-arm  Bed: Coyote 4 Poster  Position: Prone    INJECTION OF STEROID Right 9/28/2021    Procedure: INJECTION, STEROID Right SI joint block & steroid injection;  Surgeon: Mk George MD;  Location: [facility] OR;  Service: Neurosurgery;  Laterality: Right;  Procedure: Right SI joint block & steroid injection  Surgery Time: 30m  Anesthesia: Local MAC  Radiology: C-arm  Bed: Regular  Position: Prone    IVUS, CORONARY  8/14/2023    Procedure: IVUS, Coronary;  Surgeon: Wilman Kim MD;  Location: [facility] CATH LAB;  Service: Cardiology;;    LAPAROSCOPIC CHOLECYSTECTOMY N/A 6/23/2023    Procedure: CHOLECYSTECTOMY, LAPAROSCOPIC;  Surgeon: Richard Penny MD;   [address];  Service: General;  Laterality: N/A;    left foot surgery      left wrist surgery      LYSIS OF ADHESIONS N/A 2/19/2020    Procedure: LYSIS, ADHESIONS;  Surgeon: Robin Boyd MD;  [address];  Service: Urology;  Laterality: N/A;    NASAL SEPTUM SURGERY  5/7/15    OPEN REDUCTION AND INTERNAL FIXATION (ORIF) OF FRACTURE OF PROXIMAL HUMERUS Left 7/12/2023    Procedure: ORIF, FRACTURE, HUMERUS, PROXIMAL ;  Surgeon: Tomasz Leary MD;  [address];  Service: Orthopedics;  Laterality: Left;    PERCUTANEOUS NEPHROSTOMY Left 4/21/2019    Procedure: Creation, Nephrostomy, Percutaneous;  Surgeon: Karina Surgeon;  Location: [facility];  Service: Anesthesiology;  Laterality: Left;    REPAIR OF URETER  4/12/2019    Procedure: REPAIR, URETER;  Surgeon: Mk George MD;  Location[address];  Service: Neurosurgery;;    REPLACEMENT OF NEPHROSTOMY TUBE N/A 7/18/2019    Procedure: REPLACEMENT, NEPHROSTOMY TUBE;  Surgeon: [facility] Diagnostic Provider;  [address]  Service: Anesthesiology;  Laterality: N/A;  188    REPLACEMENT OF NEPHROSTOMY TUBE N/A 7/24/2019    Procedure: REPLACEMENT, NEPHROSTOMY TUBE;  Surgeon: Dos Diagnostic Provider;  [address];  Service: Anesthesiology;  Laterality: N/A;  188    REPLACEMENT OF NEPHROSTOMY TUBE N/A 10/7/2019    Procedure: REPLACEMENT, NEPHROSTOMY TUBE;  Surgeon: Dosc Diagnostic Provider;  [address];  Service: Anesthesiology;  Laterality: N/A;  189    REPLACEMENT OF NEPHROSTOMY TUBE N/A 11/25/2019    Procedure: REPLACEMENT, NEPHROSTOMY TUBE;  Surgeon: Dos Diagnostic Provider;  [facility];  Service: Anesthesiology;  Laterality: N/A;  Room 188/Bessy    REPLACEMENT OF NEPHROSTOMY TUBE Right 2/19/2020    Procedure: REPLACEMENT, NEPHROSTOMY TUBE removal removal;  Surgeon: Robin Boyd MD;  Location: [address];  Service: Urology;  Laterality: Right;     RIGHT HEART CATHETERIZATION Right 8/3/2023    Procedure: INSERTION, CATHETER, RIGHT HEART;  Surgeon: Aime George MD;  Location: [facility] CATH LAB;  Service: Cardiology;  Laterality: Right;    rt elbow surgery      S/P LAD COATED STENT  05/14/2010    6 total     S/P OM1 STENT  08/2003    SINUS SURGERY      F.E.S.S.    STENT, DRUG ELUTING, SINGLE VESSEL, CORONARY  8/14/2023    Procedure: Stent, Drug Eluting, Single Vessel, Coronary;  Surgeon: Wilman Kim MD;  Location: [facility] CATH LAB;  Service: Cardiology;;    TRACHEOSTOMY N/A 5/2/2019    Procedure: CREATION, TRACHEOSTOMY;  Surgeon: Sean Ruano MD;  [facility];  Service: General;  Laterality: N/A;    TUBAL LIGATION      URETERAL REIMPLANTION Left 2/19/2020    Procedure: REIMPLANTATION, URETER WITH PSOAS HITCH;  Surgeon: Robin Boyd MD;  [facility];  Service: Urology;  Laterality: Left;    VENTRICULOGRAM, LEFT  8/3/2023    Procedure: Ventriculogram, Left;  Surgeon: Aime George MD;  Location: [facility] CATH LAB;  Service: Cardiology;;       Review of patient's allergies indicates:   Allergen Reactions    Milk containing products (dairy)      Causes GI distress       Current Facility-Administered Medications on File Prior to Encounter   Medication    0.9%  NaCl infusion    fentaNYL 50 mcg/mL injection  mcg    midazolam (VERSED) 1 mg/mL injection 0.5-4 mg     Current Outpatient Medications on File Prior to Encounter   Medication Sig    ACCU-CHEK EDIN PLUS METER Misc     acetaminophen (TYLENOL) 650 MG TbSR Take 1 tablet (650 mg total) by mouth every 8 (eight) hours. (Patient not taking: Reported on 8/10/2023)    albuterol (PROVENTIL/VENTOLIN HFA) 90 mcg/actuation inhaler Inhale 2 puffs into the lungs every 6 (six) hours as needed for Wheezing. (Patient not taking: Reported on 8/10/2023)    apixaban (ELIQUIS) 5 mg Tab Take 1 tablet (5 mg total) by mouth 2 (two) times daily.    atorvastatin  "(LIPITOR) 40 MG tablet Take 1 tablet (40 mg total) by mouth every evening.    blood sugar diagnostic (ACCU-CHEK EDIN PLUS TEST STRP) Strp TEST BLOOD SUGARS 4 TIMES DAILY    cefpodoxime (VANTIN) 100 MG tablet Take 4 tablets (400 mg total) by mouth every 12 (twelve) hours. for 21 days    dapagliflozin (FARXIGA) 10 mg tablet Take 1 tablet (10 mg total) by mouth once daily. (Patient not taking: Reported on 7/10/2023)    diclofenac sodium (VOLTAREN) 1 % Gel Apply 2 g topically 3 (three) times daily. Apply to the area of pain 2-3x per day or night as needed (Patient not taking: Reported on 7/10/2023)    EScitalopram oxalate (LEXAPRO) 10 MG tablet Take 1 tablet (10 mg total) by mouth once daily.    furosemide (LASIX) 40 MG tablet Take 1 tablet (40 mg total) by mouth once daily.    gabapentin (NEURONTIN) 300 MG capsule Take 1 capsule (300 mg) in the morning and 2 capsules (600 mg) in the evening    glipiZIDE (GLUCOTROL) 10 MG TR24 Take 1 tablet (10 mg total) by mouth daily with breakfast. (Patient not taking: Reported on 8/10/2023)    insulin detemir U-100, Levemir, (LEVEMIR FLEXPEN) 100 unit/mL (3 mL) InPn pen Inject 14 Units into the skin every evening. (Patient taking differently: Inject into the skin every evening. Adjusted dose based on glucose)    losartan (COZAAR) 25 MG tablet Take 1 tablet (25 mg total) by mouth once daily.    magnesium oxide (MAG-OX) 400 mg (241.3 mg magnesium) tablet Take 1 tablet (400 mg total) by mouth 2 (two) times daily.    metoprolol succinate (TOPROL-XL) 25 MG 24 hr tablet Take 0.5 tablets (12.5 mg total) by mouth once daily.    multivitamin (THERAGRAN) per tablet Take 1 tablet by mouth once daily. (Patient not taking: Reported on 8/10/2023)    oxyCODONE (ROXICODONE) 5 MG immediate release tablet Take 1 tablet (5 mg total) by mouth every 4 (four) hours as needed for Pain. (Patient not taking: Reported on 8/10/2023)    pen needle, diabetic (NOVOTWIST) 32 gauge x 1/5" Ndle Use " "1 needle to inject insulin four times daily    pen needle, diabetic 32 gauge x 5/32" Ndle Use as directed    spironolactone (ALDACTONE) 25 MG tablet Take 0.5 tablets (12.5 mg total) by mouth once daily.    ticagrelor (BRILINTA) 90 mg tablet Take 1 tablet (90 mg total) by mouth 2 (two) times daily.     Family History       Problem Relation (Age of Onset)    Diabetes Mother, Father, Sister, Brother, Sister    Heart attack Father    Heart disease Mother    Leukemia Father    No Known Problems Sister, Brother, Brother, Maternal Grandmother, Maternal Grandfather, Paternal Grandmother, Paternal Grandfather, Son, Son, Maternal Aunt, Maternal Uncle, Paternal Aunt, Paternal Uncle          Tobacco Use    Smoking status: Never    Smokeless tobacco: Never   Substance and Sexual Activity    Alcohol use: No     Alcohol/week: 0.0 standard drinks of alcohol    Drug use: No    Sexual activity: Yes     Partners: Male     Birth control/protection: Post-menopausal     Comment:      Review of Systems   Constitutional: Positive for decreased appetite and malaise/fatigue. Negative for chills and fever.   HENT: Negative.     Cardiovascular:  Negative for chest pain, leg swelling and palpitations.   Respiratory: Negative.  Negative for cough and shortness of breath.    Musculoskeletal: Negative.    Gastrointestinal:  Negative for abdominal pain, nausea and vomiting.   Genitourinary: Negative.    Neurological: Negative.    Psychiatric/Behavioral:  Negative for altered mental status. The patient is not nervous/anxious.      Objective:     Vital Signs (Most Recent):  Temp: 96.8 °F (36 °C) (08/23/23 1105)  Pulse: 80 (08/23/23 1405)  Resp: 20 (08/23/23 1405)  BP: (!) 119/56 (08/23/23 1405)  SpO2: 99 % (08/23/23 1405) Vital Signs (24h Range):  Temp:  [96.8 °F (36 °C)-103.1 °F (39.5 °C)] 96.8 °F (36 °C)  Pulse:  [] 80  Resp:  [15-29] 20  SpO2:  [94 %-100 %] 99 %  BP: ()/() 119/56     Weight: 61.1 kg (134 lb 11.2 " oz)  Body mass index is 23.12 kg/m².    SpO2: 99 %         Intake/Output Summary (Last 24 hours) at 8/23/2023 1506  Last data filed at 8/23/2023 1305  Gross per 24 hour   Intake 2539.44 ml   Output 840 ml   Net 1699.44 ml       Lines/Drains/Airways       Drain  Duration                  Urethral Catheter 08/23/23 1338 16 Fr. <1 day              Peripheral Intravenous Line  Duration                  Peripheral IV - Single Lumen 08/16/23 0934 20 G;1 3/4 in Left Upper Arm 7 days         Peripheral IV - Single Lumen 08/19/23 1440 18 G;2 in Anterior;Right Upper Arm 4 days                     Physical Exam  Vitals and nursing note reviewed.   Constitutional:       General: She is not in acute distress.     Appearance: She is not toxic-appearing or diaphoretic.   HENT:      Head: Normocephalic and atraumatic.      Mouth/Throat:      Mouth: Mucous membranes are moist.      Pharynx: Oropharynx is clear.   Eyes:      Extraocular Movements: Extraocular movements intact.      Conjunctiva/sclera: Conjunctivae normal.   Cardiovascular:      Rate and Rhythm: Regular rhythm. Tachycardia present.      Heart sounds: No murmur heard.     No gallop.   Pulmonary:      Effort: Pulmonary effort is normal. No respiratory distress.      Breath sounds: No wheezing.   Abdominal:      General: There is no distension.      Palpations: Abdomen is soft.      Tenderness: There is generalized abdominal tenderness. There is no guarding.   Musculoskeletal:         General: No tenderness. Normal range of motion.      Cervical back: Normal range of motion and neck supple.      Right lower leg: No edema.      Left lower leg: No edema.   Skin:     General: Skin is warm and dry.   Neurological:      Mental Status: She is alert and oriented to person, place, and time. Mental status is at baseline.      Cranial Nerves: No dysarthria.   Psychiatric:         Mood and Affect: Mood normal.         Behavior: Behavior normal.       Significant Labs: CMP   Recent  Labs   Lab 08/22/23  0513 08/23/23  0104 08/23/23  0201    139 138   K 4.0 4.2 3.8    104 106   CO2 24 19* 22*   * 119* 134*   BUN 9 10 12   CREATININE 0.7 0.8 0.9   CALCIUM 8.2* 8.6* 8.5*   PROT 6.4  --  6.6   ALBUMIN 1.4*  --  1.5*   BILITOT 6.1*  --  7.6*   ALKPHOS 653*  --  743*   *  --  172*   ALT 80*  --  90*   ANIONGAP 9 16 10   , Troponin   Recent Labs   Lab 08/23/23  0201 08/23/23  0410   TROPONINI 6.090* 8.147*   , and All pertinent lab results from the last 24 hours have been reviewed.    Significant Imaging: EKG: sinus rhythm, TWI aVL and I (unchanged from 8/19 ecg)    Assessment and Plan:     NSTEMI (non-ST elevated myocardial infarction)  63 yoF w/ CAD s/p GINGER to ostial LAD 2014 and more recent posterior STEMI s/p [facility] 8/3 without intervention and 8/14 w/ thrombectomy and GINGER to ramus intermedius and noted mild ISR of previous LAD stents presenting w/ up-trending troponin in the setting of worsening septic shock secondary to recurrent cholangitis 2/2 biliary obstruction from newly diagnosed poorly differentiated carcinoma. Patient without chest pain. ECG with unchanged previously noted T-wave inversions in leads aVL and I, otherwise no new ischemic changes. Overall suspect type II NSTEMI in the setting of septic shock. However, worsening in-stent stenosis not excluded. Would recommend repeat TTE to assess of new regional wall motion abnormalities. Agree with continued DAPT, would hold on heparin gtt given likely type II NSTEMI.    -- Obtain updated TTE  -- Continue Aspirin and Clopidogrel for now  -- Recommend Clopidogrel response test (given in-stent restenosis through clopidogrel)   -- Consider switching to alternative second antiplatelet if no response to clopidogrel  -- Trend troponin to peak  -- STAT ECG for chest pain      VTE Risk Mitigation (From admission, onward)         Ordered     enoxaparin injection 40 mg  Every 24 hours         08/23/23 1026                Thank  you for your consult. I will follow-up with patient. Please contact us if you have any additional questions.    Richard Lan MD  Cardiology   [facility] - Cardiac Medical ICU

## 2023-08-23 NOTE — CARE UPDATE
RAPID RESPONSE NURSE PROACTIVE ROUNDING NOTE       Time of Visit: 0145    Admit Date: 2023  LOS: 9  Code Status: Full Code   Date of Visit: 2023  : 1960  Age: 63 y.o.  Sex: female  Race: Black or   Bed: 348/348 A:   MRN: [id number]  Was the patient discharged from an ICU this admission? Yes   Was the patient discharged from a PACU within last 24 hours? Yes   Did the patient receive conscious sedation/general anesthesia in last 24 hours? No  Was the patient in the ED within the past 24 hours? No  Was the patient on NIPPV within the past 24 hours? No   Attending Physician: Baudilio Lozada MD  Primary Service: [facility] MED    Time spent at the bedside: 15 -30 min    SITUATION    Notified by charge RN via phone call.  Reason for alert: sepsis concern   Called to evaluate the patient for Circulatory    BACKGROUND     Why is the patient in the hospital?: Biliary obstruction    Patient has a past medical history of Anticoagulant long-term use, Asthma, Back pain, Bradycardia, unspecified, CAD (coronary artery disease), Carotid artery stenosis, Chronic combined systolic and diastolic CHF (congestive heart failure), Deep vein thrombosis, Diabetes mellitus type 2 in obese, HTN (hypertension), benign, Hyperlipidemia, Keloid cicatrix, NSTEMI (non-ST elevated myocardial infarction), Nuclear sclerosis - Right Eye, Primary localized osteoarthrosis, lower leg, Senile nuclear sclerosis, Sleep apnea, and 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    Last Vitals:  Temp: 99.2 °F (37.3 °C) ( 0215)  Pulse: 111 ( 0045)  Resp: 29 ( 0045)  BP: 91/52 ( 0206)  SpO2: 99 % ( 0045)    24 Hours Vitals Range:  Temp:  [97.7 °F (36.5 °C)-103.1 °F (39.5 °C)]   Pulse:  []   Resp:  [16-29]   BP: ()/()   SpO2:  [93 %-100 %]     Labs:  Recent Labs     23  08/22/23  0513 08/22/23  2341 08/23/23  0201   WBC 11.25 10.52  --  11.78   HGB 8.8* 8.8*  --  9.1*   HCT 27.7* 27.7* 34* 29.4*    269  --  309       Recent Labs     08/20/23  0600 08/21/23  0613 08/22/23  0513    140 138   K 3.5 3.4* 4.0    105 105   CO2 25 26 24   BUN 9 9 9   CREATININE 0.7 0.8 0.7   GLU 77 104 148*   MG  --  2.2 2.1        Recent Labs     08/22/23  2341   PH 7.453*   PCO2 34.9*   PO2 95   HCO3 24.5   POCSATURATED 98   BE 1        ASSESSMENT    Physical Exam    Upon arrival pt on bipap, was able to answer some orientation questions correctly but not aware of situation. Pt mumbles and talks low, falls asleep easily and during conversation. Eyes are jaundice, pupils 5/brisk bilaterally, moves all extremities to command, and a weak cough.     Pt had previous temperature of 103 with rigors and tachycardia of 140. 's.     INTERVENTIONS    The patient was seen for Medical problem. Staff concerns included high temperature. The following interventions were performed: lactic acid, continuous pulse ox monitoring continued, and continuous cardiac monitoring .    During the above described state, MD ordered sepsis workup ISTAT lactic (1.8)      RECOMMENDATIONS    Removed bipap (unable to maintain secretions safety right now to have bipap on)  Added serum lactic acid with AM labs  Will follow up ammonia results   Temp down to 99 treat with tylenol as needed  Concern for down trending BP, watch closely.  Bladder scan 650 (straight cath)       PROVIDER ESCALATION    Yes/No  Yes    Orders received and case discussed with Dr. Dubois .    Disposition: Remain in room 348.    FOLLOW-UP    Bedside Neema HERRON updated on plan of care. Instructed to call the Rapid Response Nurse, Kacey Milton RN at [phone] for additional questions or concerns.

## 2023-08-23 NOTE — CONSULTS
Consult received. Pt seen and examined. Will be admitted to MICU. Full H&P to follow.     Rosalba Vega [facility]-  Critical Care Medicine  08/23/2023 5:16 AM

## 2023-08-23 NOTE — SUBJECTIVE & OBJECTIVE
Subjective:     Interval History:   - Overnight became febrile to 103, hypoxic and tachycardic   - requiring bipap  - Transferred to [facility] up to 7.6, Alk Phos 743, AST//90    Review of Systems   Constitutional:  Positive for activity change, chills, fatigue and fever.   HENT:  Positive for trouble swallowing and voice change.    Respiratory:  Positive for shortness of breath.    Cardiovascular:  Negative for chest pain.   Gastrointestinal:  Positive for abdominal distention. Negative for abdominal pain, nausea and vomiting.   Skin:  Positive for color change.   Neurological:  Positive for weakness.     Objective:     Vital Signs (Most Recent):  Temp: 96.8 °F (36 °C) (08/23/23 1105)  Pulse: 71 (08/23/23 1205)  Resp: 15 (08/23/23 1205)  BP: (!) 106/44 (08/23/23 1205)  SpO2: 99 % (08/23/23 1205) Vital Signs (24h Range):  Temp:  [96.8 °F (36 °C)-103.1 °F (39.5 °C)] 96.8 °F (36 °C)  Pulse:  [] 71  Resp:  [15-29] 15  SpO2:  [94 %-100 %] 99 %  BP: ()/() 106/44     Weight: 61.1 kg (134 lb 11.2 oz) (08/23/23 0600)  Body mass index is 23.12 kg/m².      Intake/Output Summary (Last 24 hours) at 8/23/2023 1230  Last data filed at 8/23/2023 1205  Gross per 24 hour   Intake 2422.61 ml   Output 700 ml   Net 1722.61 ml       Lines/Drains/Airways       Drain  Duration             Female External Urinary Catheter 08/23/23 0515 <1 day              Peripheral Intravenous Line  Duration                  Peripheral IV - Single Lumen 08/16/23 0934 20 G;1 3/4 in Left Upper Arm 7 days         Peripheral IV - Single Lumen 08/19/23 1440 18 G;2 in Anterior;Right Upper Arm 3 days                     Physical Exam  Vitals and nursing note reviewed.   Constitutional:       Appearance: She is obese. She is ill-appearing.   Eyes:      Comments: Sclera jaundice   Cardiovascular:      Rate and Rhythm: Normal rate and regular rhythm.      Pulses: Normal pulses.      Heart sounds: Normal heart sounds.   Pulmonary:       Comments: On bipap  Abdominal:      General: There is no distension.      Palpations: Abdomen is soft.      Tenderness: There is no abdominal tenderness.   Skin:     General: Skin is warm and dry.      Coloration: Skin is jaundiced.          Significant Labs:  CBC:   Recent Labs   Lab 08/22/23  0513 08/22/23  2341 08/23/23  0201 08/23/23  0410   WBC 10.52  --  11.78 11.88   HGB 8.8*  --  9.1* 11.0*   HCT 27.7* 34* 29.4* 35.0*     --  309 313     CMP:   Recent Labs   Lab 08/23/23  0201   *   CALCIUM 8.5*   ALBUMIN 1.5*   PROT 6.6      K 3.8   CO2 22*      BUN 12   CREATININE 0.9   ALKPHOS 743*   ALT 90*   *   BILITOT 7.6*     All pertinent lab results from the last 24 hours have been reviewed.      Significant Imaging:  Imaging results within the past 24 hours have been reviewed.

## 2023-08-23 NOTE — ASSESSMENT & PLAN NOTE
BG goal: 140-180      - Novolog [facility] 150/25  - POCT Glucose before meals and at bedtime  - Hypoglycemia protocol in place

## 2023-08-23 NOTE — SUBJECTIVE & OBJECTIVE
Past Medical History:   Diagnosis Date    Anticoagulant long-term use     Asthma     Back pain     Bradycardia, unspecified 05/08/2019    The etiology of the bradycardic episode is unclear.  The have appear to be respiratory in origin (at least the 1st episode).  We will continue to monitor carefully.  We are awaiting evaluation by Cardiology.      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, with long-term current use of insulin 01/09/2020       Past Surgical History:   Procedure Laterality Date    ANGIOGRAM, CORONARY, WITH LEFT HEART CATHETERIZATION N/A 8/3/2023    Procedure: Angiogram, Coronary, with Left Heart Cath;  Surgeon: Aime George MD;  Location: [facility] CATH LAB;  Service: Cardiology;  Laterality: N/A;    ANGIOGRAM, CORONARY, WITH LEFT HEART CATHETERIZATION N/A 8/14/2023    Procedure: Angiogram, Coronary, with Left Heart Cath;  Surgeon: Wilman Kim MD;  Location: [facility] CATH LAB;  Service: Cardiology;  Laterality: N/A;    ANTEGRADE NEPHROSTOGRAPHY Left 12/11/2019    Procedure: Nephrostogram - antegrade;  Surgeon: Robin Boyd MD;  [facility];  Service: Urology;  Laterality: Left;    BRONCHOSCOPY N/A 5/2/2019    Procedure: BRONCHOSCOPY;  Surgeon: Sean Ruano MD;  Location: [facility] OR [facility] FLR;  Service: General;  Laterality: N/A;    CARDIAC CATHETERIZATION      CATARACT EXTRACTION       cataract extraction left eye      cataracts      CAUDAL EPIDURAL STEROID INJECTION N/A 2019    Procedure: Injection-steroid-epidural-caudal;  Surgeon: Dave Bentley Jr., MD;  Location: [facility] PAIN MGT;  Service: Pain Management;  Laterality: N/A;     SECTION, LOW TRANSVERSE      COLONOSCOPY N/A 2019    Procedure: COLONOSCOPY;  Surgeon: Al Alaniz MD;  Location: [facility] ENDO (2ND FLR);  Service: Endoscopy;  Laterality: N/A;    CORONARY ANGIOPLASTY      CYSTOGRAM N/A 2019    Procedure: CYSTOGRAM INTRAOP;  Surgeon: Robin Boyd MD;  [facility];  Service: Urology;  Laterality: N/A;  1 HOUR    CYSTOSCOPY W/ RETROGRADES Left 2019    Procedure: CYSTOSCOPY, WITH RETROGRADE PYELOGRAM;  Surgeon: Robin Boyd MD;  [facility];  Service: Urology;  Laterality: Left;  fluro    ENDOSCOPIC ULTRASOUND OF UPPER GASTROINTESTINAL TRACT N/A 6/15/2023    Procedure: ULTRASOUND, UPPER GI TRACT, ENDOSCOPIC;  Surgeon: Lisa Kim MD;  [facility]  Service: Endoscopy;  Laterality: N/A;    ENDOSCOPIC ULTRASOUND OF UPPER GASTROINTESTINAL TRACT  2023    Procedure: ULTRASOUND, UPPER GI TRACT, ENDOSCOPIC;  Surgeon: Wayne Adamson MD;  Location: [facility] (2ND FLR);  Service: Endoscopy;;    ERCP N/A 6/15/2023    Procedure: ERCP (ENDOSCOPIC RETROGRADE CHOLANGIOPANCREATOGRAPHY);  Surgeon: Lisa Kim MD;  Location: [facility] ENDO (2ND FLR);  Service: Endoscopy;  Laterality: N/A;    ERCP N/A 2023    Procedure: ERCP (ENDOSCOPIC RETROGRADE CHOLANGIOPANCREATOGRAPHY);  Surgeon: Wayne Adamson MD;  Location: [facility] ENDO (2ND FLR);  Service: Endoscopy;  Laterality: N/A;  eliquis and pletal ok to hold-see te 23-dr [id number] portal    ERCP N/A 2023    Procedure: ERCP (ENDOSCOPIC RETROGRADE CHOLANGIOPANCREATOGRAPHY);  Surgeon: Wayne Adamson MD;  Location: [facility] ENDO (2ND FLR);  Service: Endoscopy;  Laterality: N/A;    ERCP N/A 2023     Procedure: ERCP (ENDOSCOPIC RETROGRADE CHOLANGIOPANCREATOGRAPHY);  Surgeon: Christian Huff MD;  Location: [facility] ENDO (2ND FLR);  Service: Endoscopy;  Laterality: N/A;    ESOPHAGOGASTRODUODENOSCOPY W/ PEG  5/2/2019    Procedure: EGD, WITH PEG TUBE INSERTION;  Surgeon: Sean Ruano MD;  Location: [facility] OR 2ND FLR;  Service: General;;    EXCISION TURBINATE, SUBMUCOUS      FLEXIBLE SIGMOIDOSCOPY N/A 5/13/2019    Procedure: SIGMOIDOSCOPY, FLEXIBLE;  Surgeon: ALBERTO Amin MD;  Location: [facility] ENDO (2ND FLR);  Service: Endoscopy;  Laterality: N/A;    FLEXIBLE SIGMOIDOSCOPY N/A 5/21/2019    Procedure: SIGMOIDOSCOPY, FLEXIBLE;  Surgeon: ALBERTO Amin MD;  Location: [facility] ENDO (2ND FLR);  Service: Endoscopy;  Laterality: N/A;    FUSION OF LUMBAR SPINE BY ANTERIOR APPROACH Left 4/12/2019    Procedure: FUSION, SPINE, LUMBAR, ANTERIOR APPROACH Left L5-S1 Anterior to Psoa Interbody Fusion, L5-S1 Posterior Instrumentation;  Surgeon: Mk George MD;  [address]  Service: Neurosurgery;  Laterality: Left;  Porcedure:  Left L5-S1 Anterior to Psoa Interbody Fusion, L5-S1 Posterior Instrumentation  Surgery Time: 4 Hrs  LOS: 2-3  Anesthesia: General   Blood: Type & Screen  R    HAND SURGERY Left     HAND SURGERY Right     torn ligament repair/ Dr. Yeboah    HYSTERECTOMY      INJECTION OF STEROID Right 12/10/2020    Procedure: INJECTION, STEROID Right SI Joint Block and Steroid Injection;  Surgeon: Mk George MD;  Location: [facility] OR;  Service: Neurosurgery;  Laterality: Right;  Procedure: Right SI Joint Block and Steroid Injection   SUrgery Time: 30 Min  LOS: 0  Anesthesia: MAC  Radiology: C-arm  Bed: [place] [id number] Poster  Position: Prone    INJECTION OF STEROID Right 9/28/2021    Procedure: INJECTION, STEROID Right SI joint block & steroid injection;  Surgeon: Mk George MD;  Location: [facility];  Service: Neurosurgery;  Laterality: Right;  Procedure: Right SI joint block & steroid injection  Surgery  Time: 30m  Anesthesia: Local MAC  Radiology: C-arm  Bed: Regular  Position: Prone    IVUS, CORONARY  8/14/2023    Procedure: IVUS, Coronary;  Surgeon: Wilman Kim MD;  Location: [facility] CATH LAB;  Service: Cardiology;;    LAPAROSCOPIC CHOLECYSTECTOMY N/A 6/23/2023    Procedure: CHOLECYSTECTOMY, LAPAROSCOPIC;  Surgeon: Richard Penny MD;  Location: [address];  Service: General;  Laterality: N/A;    left foot surgery      left wrist surgery      LYSIS OF ADHESIONS N/A 2/19/2020    Procedure: LYSIS, ADHESIONS;  Surgeon: Robin Boyd MD;  Location: [address];  Service: Urology;  Laterality: N/A;    NASAL SEPTUM SURGERY  5/7/15    OPEN REDUCTION AND INTERNAL FIXATION (ORIF) OF FRACTURE OF PROXIMAL HUMERUS Left 7/12/2023    Procedure: ORIF, FRACTURE, HUMERUS, PROXIMAL ;  Surgeon: Tomasz Leary MD;  Location: [address];  Service: Orthopedics;  Laterality: Left;    PERCUTANEOUS NEPHROSTOMY Left 4/21/2019    Procedure: Creation, Nephrostomy, Percutaneous;  Surgeon: Karina Surgeon;  Location: [facility];  Service: Anesthesiology;  Laterality: Left;    REPAIR OF URETER  4/12/2019    Procedure: REPAIR, URETER;  Surgeon: Mk George MD;  Location: [address];  Service: Neurosurgery;;    REPLACEMENT OF NEPHROSTOMY TUBE N/A 7/18/2019    Procedure: REPLACEMENT, NEPHROSTOMY TUBE;  Surgeon: [facility] Diagnostic Provider;  Location: [address];  Service: Anesthesiology;  Laterality: N/A;  188    REPLACEMENT OF NEPHROSTOMY TUBE N/A 7/24/2019    Procedure: REPLACEMENT, NEPHROSTOMY TUBE;  Surgeon: [facility] Diagnostic Provider;  [address];  Service: Anesthesiology;  Laterality: N/A;  188    REPLACEMENT OF NEPHROSTOMY TUBE N/A 10/7/2019    Procedure: REPLACEMENT, NEPHROSTOMY TUBE;  Surgeon: [facility] Diagnostic Provider;  [address];  Service: Anesthesiology;  Laterality: N/A;  189    REPLACEMENT OF NEPHROSTOMY TUBE N/A 11/25/2019    Procedure: REPLACEMENT, NEPHROSTOMY TUBE;   Surgeon: [facility] Diagnostic Provider;  [address];  Service: Anesthesiology;  Laterality: N/A;  Room 188/Bessy    REPLACEMENT OF NEPHROSTOMY TUBE Right 2/19/2020    Procedure: REPLACEMENT, NEPHROSTOMY TUBE removal removal;  Surgeon: Robin Boyd MD;  Location: [address];  Service: Urology;  Laterality: Right;    RIGHT HEART CATHETERIZATION Right 8/3/2023    Procedure: INSERTION, CATHETER, RIGHT HEART;  Surgeon: Aime George MD;  Location: [facility] CATH LAB;  Service: Cardiology;  Laterality: Right;    rt elbow surgery      S/P LAD COATED STENT  05/14/2010    6 total     S/P OM1 STENT  08/2003    SINUS SURGERY      F.E.S.S.    STENT, DRUG ELUTING, SINGLE VESSEL, CORONARY  8/14/2023    Procedure: Stent, Drug Eluting, Single Vessel, Coronary;  Surgeon: Wilman Kim MD;  Location: [facility] CATH LAB;  Service: Cardiology;;    TRACHEOSTOMY N/A 5/2/2019    Procedure: CREATION, TRACHEOSTOMY;  Surgeon: Sean Ruano MD;  Location: [address];  Service: General;  Laterality: N/A;    TUBAL LIGATION      URETERAL REIMPLANTION Left 2/19/2020    Procedure: REIMPLANTATION, URETER WITH PSOAS HITCH;  Surgeon: Robin Boyd MD;  Location: [address];  Service: Urology;  Laterality: Left;    VENTRICULOGRAM, LEFT  8/3/2023    Procedure: Ventriculogram, Left;  Surgeon: Aime George MD;  Location: [facility] CATH LAB;  Service: Cardiology;;       Review of patient's allergies indicates:   Allergen Reactions    Milk containing products (dairy)      Causes GI distress       Family History       Problem Relation (Age of Onset)    Diabetes Mother, Father, Sister, Brother, Sister    Heart attack Father    Heart disease Mother    Leukemia Father    No Known Problems Sister, Brother, Brother, Maternal Grandmother, Maternal Grandfather, Paternal Grandmother, Paternal Grandfather, Son, Son, Maternal Aunt, Maternal Uncle, Paternal Aunt, Paternal Uncle          Tobacco Use    Smoking status: Never     Smokeless tobacco: Never   Substance and Sexual Activity    Alcohol use: No     Alcohol/week: 0.0 standard drinks of alcohol    Drug use: No    Sexual activity: Yes     Partners: Male     Birth control/protection: Post-menopausal     Comment:       Review of Systems   Constitutional:  Negative for chills and fever.   Respiratory:  Negative for shortness of breath.    Cardiovascular:  Negative for chest pain.   Gastrointestinal:  Negative for abdominal pain, nausea and vomiting.     Objective:     Vital Signs (Most Recent):  Temp: 99.2 °F (37.3 °C) (08/23/23 0215)  Pulse: (!) 111 (08/23/23 0045)  Resp: (!) 29 (08/23/23 0045)  BP: (!) 87/53 (08/23/23 0302)  SpO2: 99 % (08/23/23 0045) Vital Signs (24h Range):  Temp:  [97.7 °F (36.5 °C)-103.1 °F (39.5 °C)] 99.2 °F (37.3 °C)  Pulse:  [] 111  Resp:  [16-29] 29  SpO2:  [93 %-100 %] 99 %  BP: ()/() 87/53   Weight: 58.6 kg (129 lb 3 oz)  Body mass index is 22.18 kg/m².      Intake/Output Summary (Last 24 hours) at 8/23/2023 0349  Last data filed at 8/22/2023 1813  Gross per 24 hour   Intake 220 ml   Output 700 ml   Net -480 ml          Physical Exam  Constitutional:       Appearance: She is ill-appearing.      Comments: Obtunded but arousable to voice   HENT:      Head: Normocephalic and atraumatic.   Eyes:      General: Scleral icterus present.   Cardiovascular:      Rate and Rhythm: Normal rate and regular rhythm.      Pulses: Normal pulses.      Heart sounds: Normal heart sounds.   Pulmonary:      Effort: Pulmonary effort is normal.      Breath sounds: Normal breath sounds. No wheezing, rhonchi or rales.   Abdominal:      General: There is no distension.      Palpations: Abdomen is soft.      Tenderness: There is no abdominal tenderness. There is no guarding or rebound.   Musculoskeletal:      Right lower leg: No edema.      Left lower leg: No edema.   Skin:     General: Skin is warm and dry.   Neurological:      Mental Status: She is oriented to  person, place, and time.      Comments: Wax and wane mental status.             Vents:  Oxygen Concentration (%): 24 (08/23/23 0025)  Lines/Drains/Airways       Peripheral Intravenous Line  Duration                  Peripheral IV - Single Lumen 08/16/23 0934 20 G;1 3/4 in Left Upper Arm 6 days         Peripheral IV - Single Lumen 08/19/23 1440 18 G;2 in Anterior;Right Upper Arm 3 days                  Significant Labs:    CBC/Anemia Profile:  Recent Labs   Lab 08/21/23  2037 08/22/23  0513 08/22/23  2341 08/23/23  0201   WBC 11.25 10.52  --  11.78   HGB 8.8* 8.8*  --  9.1*   HCT 27.7* 27.7* 34* 29.4*    269  --  309   MCV 83 82  --  85   RDW 16.4* 17.1*  --  17.6*        Chemistries:  Recent Labs   Lab 08/21/23  0613 08/22/23  0513 08/23/23  0104 08/23/23  0201    138 139 138   K 3.4* 4.0 4.2 3.8    105 104 106   CO2 26 24 19* 22*   BUN 9 9 10 12   CREATININE 0.8 0.7 0.8 0.9   CALCIUM 8.0* 8.2* 8.6* 8.5*   ALBUMIN 1.5* 1.4*  --  1.5*   PROT 6.3 6.4  --  6.6   BILITOT 5.0* 6.1*  --  7.6*   ALKPHOS 670* 653*  --  743*   ALT 80* 80*  --  90*   * 158*  --  172*   MG 2.2 2.1  --  2.1       All pertinent labs within the past 24 hours have been reviewed.    Significant Imaging: I have reviewed all pertinent imaging results/findings within the past 24 hours.

## 2023-08-23 NOTE — H&P
[facility] - Cardiac Medical ICU  Critical Care Medicine  History & Physical    Patient Name: Mariann Huff  MRN: [id number]  Admission Date: 8/14/2023  Hospital Length of Stay: 9 days  Code Status: Full Code  Attending Physician: Sean Shahid MD   Primary Care Provider: Jasbir Haney MD   Principal Problem: Shock    Subjective:     HPI:  63 y.o. female with CAD s/p GINGER to ostial LAD in 2014, HTN, HLD, DM2, MURTAZA who presented to the ED to the ER after a near syncopal event at home around 4 pm. She also feels tired and her appetite lately has decreased. she had chest pain and visceral symptoms with nausea and vomiting. She reports losing weight as well.She has had abdominal symptoms with poor PO intake leading to orthostasis. Today she had another near syncopal event and is why she was brought to the ER. Here her ECG showed the posterior changes for which posterior EKG was obtained with showed STEMI. Bedside echo showed an EF 45-50% and norma-lateral and infero-lateral hypokinesis. She was taken to cath lab and her symptoms resolved after PCI. Please refer to PCI for full details.     Patient subsequently developed acute abdominal pain associated with nausea and vomiting. Of note, patient was recently admitted (6/23) for suspected biliary pancreatitis and biliary stricture treated with biliary sphincterotomy, biliary stent, and cholecystectomy. Given worsening transaminitis w/ elevated T-Bili, concern for stent occlusion. Patient had ERCP and EUS on 8/17/23. Visualized a partial occluded stent in the biliary tree. One covered metal stent was placed into the common bile duct. Concern for metastatic cancer is high due to multiple liver lesions and mass on pancreatic head per AES. S/p FNA biopsy with ERCP and stent placement on 8/17.      T bili continued to rise on 8/21, patient underwent ERCP again on 8/22 which demonstrated a visibly ocluded stent in the biliary tree with a single biliary stricture in main BD. One  covered metal stent placed in CBD. Several hours following this procedure, patient developed rapid onset fever to 103 and rapid drop in BP from SBP 180s to SBP 80s. Patient admitted to MICU for further management.       Hospital/ICU Course:  No notes on file     Past Medical History:   Diagnosis Date    Anticoagulant long-term use     Asthma     Back pain     Bradycardia, unspecified 05/08/2019    The etiology of the bradycardic episode is unclear.  The have appear to be respiratory in origin (at least the 1st episode).  We will continue to monitor carefully.  We are awaiting evaluation by Cardiology.     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, with long-term current use of insulin 01/09/2020       Past Surgical History:   Procedure Laterality Date    ANGIOGRAM, CORONARY, WITH LEFT HEART CATHETERIZATION N/A 8/3/2023    Procedure: Angiogram, Coronary, with Left Heart Cath;  Surgeon: Aime George MD;  Location: [facility] CATH LAB;  Service: Cardiology;  Laterality: N/A;    ANGIOGRAM, CORONARY, WITH LEFT HEART CATHETERIZATION N/A 8/14/2023    Procedure: Angiogram, Coronary, with Left Heart Cath;  Surgeon: Wilman Kim MD;  Location: [facility] CATH LAB;  Service: Cardiology;  Laterality: N/A;    ANTEGRADE NEPHROSTOGRAPHY Left 12/11/2019    Procedure: Nephrostogram - antegrade;  Surgeon: Robin Boyd  MD;  [facility];  Service: Urology;  Laterality: Left;    BRONCHOSCOPY N/A 2019    Procedure: BRONCHOSCOPY;  Surgeon: Sean Ruano MD;  [facility];  Service: General;  Laterality: N/A;    CARDIAC CATHETERIZATION      CATARACT EXTRACTION      cataract extraction left eye      cataracts      CAUDAL EPIDURAL STEROID INJECTION N/A 2019    Procedure: Injection-steroid-epidural-caudal;  Surgeon: Dave Bentley Jr., MD;  Location: [facility] PAIN MGT;  Service: Pain Management;  Laterality: N/A;     SECTION, LOW TRANSVERSE      COLONOSCOPY N/A 2019    Procedure: COLONOSCOPY;  Surgeon: Al Alaniz MD;  Location: [facility] ENDO (2ND FLR);  Service: Endoscopy;  Laterality: N/A;    CORONARY ANGIOPLASTY      CYSTOGRAM N/A 2019    Procedure: CYSTOGRAM INTRAOP;  Surgeon: Robin Boyd MD;  [facility];  Service: Urology;  Laterality: N/A;  1 HOUR    CYSTOSCOPY W/ RETROGRADES Left 2019    Procedure: CYSTOSCOPY, WITH RETROGRADE PYELOGRAM;  Surgeon: Robin Boyd MD;  [facility];  Service: Urology;  Laterality: Left;  fluro    ENDOSCOPIC ULTRASOUND OF UPPER GASTROINTESTINAL TRACT N/A 6/15/2023    Procedure: ULTRASOUND, UPPER GI TRACT, ENDOSCOPIC;  Surgeon: Lisa Kim MD;  [facility]  Service: Endoscopy;  Laterality: N/A;    ENDOSCOPIC ULTRASOUND OF UPPER GASTROINTESTINAL TRACT  2023    Procedure: ULTRASOUND, UPPER GI TRACT, ENDOSCOPIC;  Surgeon: Wayne Adamson MD;  [facility]  Service: Endoscopy;;    ERCP N/A 6/15/2023    Procedure: ERCP (ENDOSCOPIC RETROGRADE CHOLANGIOPANCREATOGRAPHY);  Surgeon: Lisa Kim MD;  Location: [facility] ENDO (2ND FLR);  Service: Endoscopy;  Laterality: N/A;    ERCP N/A 2023    Procedure: ERCP (ENDOSCOPIC RETROGRADE CHOLANGIOPANCREATOGRAPHY);  Surgeon: Wayne Adamson MD;  Location: [facility] ENDO (2ND FLR);  Service: Endoscopy;  Laterality: N/A;   eliquis and pletal ok to hold-see te 7/26/23-dr martinspqzcxozno-ah-unhve portal    ERCP N/A 8/17/2023    Procedure: ERCP (ENDOSCOPIC RETROGRADE CHOLANGIOPANCREATOGRAPHY);  Surgeon: Wayne Adamson MD;  Location: [facility] ENDO (2ND FLR);  Service: Endoscopy;  Laterality: N/A;    ERCP N/A 8/22/2023    Procedure: ERCP (ENDOSCOPIC RETROGRADE CHOLANGIOPANCREATOGRAPHY);  Surgeon: Christian Huff MD;  Location: [facility] ENDO (2ND FLR);  Service: Endoscopy;  Laterality: N/A;    ESOPHAGOGASTRODUODENOSCOPY W/ PEG  5/2/2019    Procedure: EGD, WITH PEG TUBE INSERTION;  Surgeon: Sean Ruano MD;  Location: [facility] OR 2ND FLR;  Service: General;;    EXCISION TURBINATE, SUBMUCOUS      FLEXIBLE SIGMOIDOSCOPY N/A 5/13/2019    Procedure: SIGMOIDOSCOPY, FLEXIBLE;  Surgeon: ALBERTO Amin MD;  Location: [facility] (2ND FLR);  Service: Endoscopy;  Laterality: N/A;    FLEXIBLE SIGMOIDOSCOPY N/A 5/21/2019    Procedure: SIGMOIDOSCOPY, FLEXIBLE;  Surgeon: ALBERTO Amin MD;  Location: [facility] (2ND FLR);  Service: Endoscopy;  Laterality: N/A;    FUSION OF LUMBAR SPINE BY ANTERIOR APPROACH Left 4/12/2019    Procedure: FUSION, SPINE, LUMBAR, ANTERIOR APPROACH Left L5-S1 Anterior to Psoa Interbody Fusion, L5-S1 Posterior Instrumentation;  Surgeon: Mk George MD;  [facility];  Service: Neurosurgery;  Laterality: Left;  Porcedure:  Left L5-S1 Anterior to Psoa Interbody Fusion, L5-S1 Posterior Instrumentation  Surgery Time: 4 Hrs  LOS: 2-3  Anesthesia: General   Blood: Type & Screen  R    HAND SURGERY Left     HAND SURGERY Right     torn ligament repair/ Dr. Yeboah    HYSTERECTOMY      INJECTION OF STEROID Right 12/10/2020    Procedure: INJECTION, STEROID Right SI Joint Block and Steroid Injection;  Surgeon: Mk George MD;  [facility];  Service: Neurosurgery;  Laterality: Right;  Procedure: Right SI Joint Block and Steroid Injection   SUrgery Time: 30 Min  LOS: 0  Anesthesia: MAC  Radiology: C-arm  Bed:  Tomer 4 Poster  Position: Prone    INJECTION OF STEROID Right 9/28/2021    Procedure: INJECTION, STEROID Right SI joint block & steroid injection;  Surgeon: Mk George MD;  Location: [facility];  Service: Neurosurgery;  Laterality: Right;  Procedure: Right SI joint block & steroid injection  Surgery Time: 30m  Anesthesia: Local MAC  Radiology: C-arm  Bed: Regular  Position: Prone    IVUS, CORONARY  8/14/2023    Procedure: IVUS, Coronary;  Surgeon: Wilman Kim MD;  Location: [facility] CATH LAB;  Service: Cardiology;;    LAPAROSCOPIC CHOLECYSTECTOMY N/A 6/23/2023    Procedure: CHOLECYSTECTOMY, LAPAROSCOPIC;  Surgeon: Richard Penny MD;  [address]  Service: General;  Laterality: N/A;    left foot surgery      left wrist surgery      LYSIS OF ADHESIONS N/A 2/19/2020    Procedure: LYSIS, ADHESIONS;  Surgeon: Robin Boyd MD;  Location: [address];  Service: Urology;  Laterality: N/A;    NASAL SEPTUM SURGERY  5/7/15    OPEN REDUCTION AND INTERNAL FIXATION (ORIF) OF FRACTURE OF PROXIMAL HUMERUS Left 7/12/2023    Procedure: ORIF, FRACTURE, HUMERUS, PROXIMAL ;  Surgeon: Tomasz Leary MD;  Location: [address];  Service: Orthopedics;  Laterality: Left;    PERCUTANEOUS NEPHROSTOMY Left 4/21/2019    Procedure: Creation, Nephrostomy, Percutaneous;  Surgeon: Karina Surgeon;  Location: [facility];  Service: Anesthesiology;  Laterality: Left;    REPAIR OF URETER  4/12/2019    Procedure: REPAIR, URETER;  Surgeon: Mk George MD;  Location: [address];  Service: Neurosurgery;;    REPLACEMENT OF NEPHROSTOMY TUBE N/A 7/18/2019    Procedure: REPLACEMENT, NEPHROSTOMY TUBE;  Surgeon: [facility] Diagnostic Provider;  [address];  Service: Anesthesiology;  Laterality: N/A;  188    REPLACEMENT OF NEPHROSTOMY TUBE N/A 7/24/2019    Procedure: REPLACEMENT, NEPHROSTOMY TUBE;  Surgeon: [facility] Diagnostic Provider;  Location: [address];  Service: Anesthesiology;   Laterality: N/A;  188    REPLACEMENT OF NEPHROSTOMY TUBE N/A 10/7/2019    Procedure: REPLACEMENT, NEPHROSTOMY TUBE;  Surgeon: [facility] Diagnostic Provider;  Location: [address];  Service: Anesthesiology;  Laterality: N/A;  189    REPLACEMENT OF NEPHROSTOMY TUBE N/A 11/25/2019    Procedure: REPLACEMENT, NEPHROSTOMY TUBE;  Surgeon: [facility] Diagnostic Provider;  [facility];  Service: Anesthesiology;  Laterality: N/A;  Room 188/Bessy    REPLACEMENT OF NEPHROSTOMY TUBE Right 2/19/2020    Procedure: REPLACEMENT, NEPHROSTOMY TUBE removal removal;  Surgeon: Robin Boyd MD;  Location: [address];  Service: Urology;  Laterality: Right;    RIGHT HEART CATHETERIZATION Right 8/3/2023    Procedure: INSERTION, CATHETER, RIGHT HEART;  Surgeon: Aime George MD;  Location: [facility] CATH LAB;  Service: Cardiology;  Laterality: Right;    rt elbow surgery      S/P LAD COATED STENT  05/14/2010    6 total     S/P OM1 STENT  08/2003    SINUS SURGERY      F.E.S.S.    STENT, DRUG ELUTING, SINGLE VESSEL, CORONARY  8/14/2023    Procedure: Stent, Drug Eluting, Single Vessel, Coronary;  Surgeon: Wilman Kim MD;  Location: [facility] CATH LAB;  Service: Cardiology;;    TRACHEOSTOMY N/A 5/2/2019    Procedure: CREATION, TRACHEOSTOMY;  Surgeon: Sean Ruano MD;  Location: [address];  Service: General;  Laterality: N/A;    TUBAL LIGATION      URETERAL REIMPLANTION Left 2/19/2020    Procedure: REIMPLANTATION, URETER WITH PSOAS HITCH;  Surgeon: Robin Boyd MD;  Location: [address];  Service: Urology;  Laterality: Left;    VENTRICULOGRAM, LEFT  8/3/2023    Procedure: Ventriculogram, Left;  Surgeon: Aime George MD;  Location: [facility] CATH LAB;  Service: Cardiology;;       Review of patient's allergies indicates:   Allergen Reactions    Milk containing products (dairy)      Causes GI distress       Family History       Problem Relation (Age of Onset)    Diabetes Mother, Father, Sister,  Brother, Sister    Heart attack Father    Heart disease Mother    Leukemia Father    No Known Problems Sister, Brother, Brother, Maternal Grandmother, Maternal Grandfather, Paternal Grandmother, Paternal Grandfather, Son, Son, Maternal Aunt, Maternal Uncle, Paternal Aunt, Paternal Uncle          Tobacco Use    Smoking status: Never    Smokeless tobacco: Never   Substance and Sexual Activity    Alcohol use: No     Alcohol/week: 0.0 standard drinks of alcohol    Drug use: No    Sexual activity: Yes     Partners: Male     Birth control/protection: Post-menopausal     Comment:       Review of Systems   Constitutional:  Negative for chills and fever.   Respiratory:  Negative for shortness of breath.    Cardiovascular:  Negative for chest pain.   Gastrointestinal:  Negative for abdominal pain, nausea and vomiting.     Objective:     Vital Signs (Most Recent):  Temp: 99.2 °F (37.3 °C) (08/23/23 0215)  Pulse: (!) 111 (08/23/23 0045)  Resp: (!) 29 (08/23/23 0045)  BP: (!) 87/53 (08/23/23 0302)  SpO2: 99 % (08/23/23 0045) Vital Signs (24h Range):  Temp:  [97.7 °F (36.5 °C)-103.1 °F (39.5 °C)] 99.2 °F (37.3 °C)  Pulse:  [] 111  Resp:  [16-29] 29  SpO2:  [93 %-100 %] 99 %  BP: ()/() 87/53   Weight: 58.6 kg (129 lb 3 oz)  Body mass index is 22.18 kg/m².      Intake/Output Summary (Last 24 hours) at 8/23/2023 0349  Last data filed at 8/22/2023 1813  Gross per 24 hour   Intake 220 ml   Output 700 ml   Net -480 ml          Physical Exam  Constitutional:       Appearance: She is ill-appearing.      Comments: Obtunded but arousable to voice   HENT:      Head: Normocephalic and atraumatic.   Eyes:      General: Scleral icterus present.   Cardiovascular:      Rate and Rhythm: Normal rate and regular rhythm.      Pulses: Normal pulses.      Heart sounds: Normal heart sounds.   Pulmonary:      Effort: Pulmonary effort is normal.      Breath sounds: Normal breath sounds. No wheezing, rhonchi or rales.    Abdominal:      General: There is no distension.      Palpations: Abdomen is soft.      Tenderness: There is no abdominal tenderness. There is no guarding or rebound.   Musculoskeletal:      Right lower leg: No edema.      Left lower leg: No edema.   Skin:     General: Skin is warm and dry.   Neurological:      Mental Status: She is oriented to person, place, and time.      Comments: Wax and wane mental status.             Vents:  Oxygen Concentration (%): 24 (08/23/23 0025)  Lines/Drains/Airways       Peripheral Intravenous Line  Duration                  Peripheral IV - Single Lumen 08/16/23 0934 20 G;1 3/4 in Left Upper Arm 6 days         Peripheral IV - Single Lumen 08/19/23 1440 18 G;2 in Anterior;Right Upper Arm 3 days                  Significant Labs:    CBC/Anemia Profile:  Recent Labs   Lab 08/21/23  2037 08/22/23  0513 08/22/23  2341 08/23/23  0201   WBC 11.25 10.52  --  11.78   HGB 8.8* 8.8*  --  9.1*   HCT 27.7* 27.7* 34* 29.4*    269  --  309   MCV 83 82  --  85   RDW 16.4* 17.1*  --  17.6*        Chemistries:  Recent Labs   Lab 08/21/23  0613 08/22/23  0513 08/23/23  0104 08/23/23  0201    138 139 138   K 3.4* 4.0 4.2 3.8    105 104 106   CO2 26 24 19* 22*   BUN 9 9 10 12   CREATININE 0.8 0.7 0.8 0.9   CALCIUM 8.0* 8.2* 8.6* 8.5*   ALBUMIN 1.5* 1.4*  --  1.5*   PROT 6.3 6.4  --  6.6   BILITOT 5.0* 6.1*  --  7.6*   ALKPHOS 670* 653*  --  743*   ALT 80* 80*  --  90*   * 158*  --  172*   MG 2.2 2.1  --  2.1       All pertinent labs within the past 24 hours have been reviewed.    Significant Imaging: I have reviewed all pertinent imaging results/findings within the past 24 hours.    Assessment/Plan:     Cardiac/Vascular  * Shock  Called as rapid overnight as patient noted to be febrile to 103, tachycardic and hypotensive with SBP of 80s. CTA chest negative for PE. Patient admitted on 8/15 for posterior STEMI s/p PCI of ramus intermedius and lateral branch with Kathieto  thrombectomy and 3X16 GINGER.  Patient recently had repeat ERCP on 8/22 in which she was noted to have occluded stent from the biliary tree in the major papilla and one metal stent was place. Repeat lactic acid 2.1, troponin 6. Bedside EKG shows diminished EF with collapsed IVC; 500 ml bolus given in setting of recent MI.     DDx includes septic shock s/t biliary obstruction, cardiogenic shock, hypovolemia.     - F/u blood cultures  - Continue broad spectrum abx (on cefepime and vanc)   - Vasopressors; maintain MAP >65    STEMI (ST elevation myocardial infarction)  Presented on admission with posterior STEMI s/p  PCI of ramus intermedius and lateral branch with Pronto thrombectomy and 3X16 GINGER. TTE shows ejection fraction of about 40% associated with grade II diastolic dysfunction.      Plan  - Continue aspirin 81 mg daily, stop after 1 month to avoid triple therapy  - Continue high intensity statin therapy (LDL goal < 70)  - Continue ASA/plavix        Elevated troponin  Noted to have uptrending troponin from 1 on 8/14 to now 6.    --trend troponin  --will consult cardiology given recent hx of STEMI and presentation of shock    Oncology  Pancreatic cancer  FNA cytology aspirate shows a poorly differentiated epithelial malignancy.  Immunostains show the cells to be positive for CK7 (strong diffuse) and negative for CK20, HepPar 1, and CDX2.  Given the immunoprofile, findings of a pancreatic mass, and focal vacuole suggestive of intracellular mucin, favor adenocarcinoma, however too poorly differentiated to say definitively.    -Will consult Heme/Onc     Endocrine  Type 2 diabetes mellitus with stage 2 chronic kidney disease and hypertension  BG goal: 140-180      - Novolog [facility] 150/25  - POCT Glucose before meals and at bedtime  - Hypoglycemia protocol in place    GI  Biliary obstruction  Recently admitted (6/23) for suspected biliary pancreatitis and biliary stricture treated EUS/ERCP and cholecystectomy. Hospital  course associated with onset of abdominal pain and progressive hyperbilirubinemia since 08/16 with CT A/P non-contrast from that time concerning for pancreatic head mass with multiple hepatic lesions concerning for underlying pancreatic cancer. S/P ERCP and stent placement on 8/17.  Noted to have continual elevation in T. Bilirubin on 8/21 to 4.0. Repeat ERCP on 8/22 with demonstrated a visibly ocluded stent in the biliary tree with a single biliary stricture in main BD; s/p metal stent placement. Repeat T. bilrubin continually elevated now at 7.6.       --GI following; appreciated recs   --trend T. Bilirubin  --Concern repeat CT AP    Hyperbilirubinemia  See biliary obstruction         Other  Sleep apnea  On BIPAP during night    --continue BIPAP        Critical Care Daily Checklist:    A: Awake: RASS Goal/Actual Goal: RASS Goal: 0-->alert and calm  Actual:     B: Spontaneous Breathing Trial Performed?     C: SAT & SBT Coordinated?  none                      D: Delirium: CAM-ICU Overall CAM-ICU: Negative   E: Early Mobility Performed? No   F: Feeding Goal:    Status:     Current Diet Order   Procedures    Diet diabetic Low Sodium,2gm; 2000 Calorie; Fluid - 2000mL     Order Specific Question:   Additional Diet Options:     Answer:   Low Sodium,2gm     Order Specific Question:   Total calories:     Answer:   2000 Calorie     Order Specific Question:   Fluid restriction:     Answer:   Fluid - 2000mL      AS: Analgesia/Sedation none   T: Thromboembolic Prophylaxis    H: HOB > 300 Yes   U: Stress Ulcer Prophylaxis (if needed) Protonix   G: Glucose Control At goal    B: Bowel Function Stool Occurrence: 1   I: Indwelling Catheter (Lines & Fontenot) Necessity    D: De-escalation of Antimicrobials/Pharmacotherapies Discontinue flagyl     Plan for the day/ETD     Code Status:  Family/Goals of Care: Full Code         Critical secondary to Patient has a condition that poses threat to life and bodily function: Shock, concern for  sepsis      Critical care was time spent personally by me on the following activities: development of treatment plan with patient or surrogate and bedside caregivers, discussions with consultants, evaluation of patient's response to treatment, examination of patient, ordering and performing treatments and interventions, ordering and review of laboratory studies, ordering and review of radiographic studies, pulse oximetry, re-evaluation of patient's condition. This critical care time did not overlap with that of any other provider or involve time for any procedures.     Jefe Aldrich MD  Critical Care Medicine  [facility] - Cardiac Medical [facility]

## 2023-08-23 NOTE — SIGNIFICANT EVENT
Called to bedside for worsening hypoxia and increasing tachycardia. On my assessment the patient was hypoxic on bipap 30% 10/5. Her HR is 130-140 and her BP was 190+ SBP. Review of her chart shows she has PMH significant for DVT/PE with acitve malignancy. She has pancreatic cancer with metastasis and biliary obstruction for which she received a metal stent via ERCP stent. Axillary temperature was 103F and sepsis protocol was initiated. She is able to move all 4 limbs and withdraws to pain. Pupils are dilated and symmetric and responsive to light. She appears to be encephalopathic and need significant prompting to move or answer questions. I am currently concerned for septic encephalopathy versus pulmonary embolism. Empiric antibiotics were initiated with cefepime and vancomycin and metronidazole was added later for anaerobic coverage. EKG showed sinus tachycardia and patient denies any abdominal or chest pain. She is non-tender to palpation and feels warm in the peripheries.     A stat ABG, lactate, CBC, CMP, ammonia, CMP, PT, BNP was ordered and I accompanied the patient down to the ED for a CTA for which we are awaiting a read. Empiric anticoagulation was held due to minor oxygen requirements with no change during my assessment as well as her recent need for transfusion of multiple units of pRBC, preferred to await results of CTA to anticoagulate.  2:23 AM  CTA shows no PE at this time. Current working diagnosis is transient bacteremia from recent instrumentation. Will continue to treat with empiric antibiotics at this time and reassess throughout the night.    Critical care time spent on the evaluation and treatment of severe organ dysfunction, review of pertinent labs and imaging studies, discussions with consulting providers and discussions with patient/family: 60 minutes.

## 2023-08-23 NOTE — PROGRESS NOTES
[facility]  Gastroenterology  Progress Note    Patient Name: Mariann Huff  MRN: [id number]  Admission Date: 8/14/2023  Hospital Length of Stay: 9 days  Code Status: Full Code   Attending Provider: Sean Shahid MD  Consulting Provider: CARLYLE LINDSEY NP  Primary Care Physician: Jsabir Haney MD  Principal Problem: Shock    HPI:  Ms. Mariann Huff is a 63 year old female for whom AES is consulted for evaluation of hyperbilirubinemia. She has a PMH significant for CAD (status post PCI in 2014), DVT (on Apixaban), MURTAZA, pancreatitis (diagnosed 05/2023; attributed to TRULICITY usage initially; status post EUS/ERCP in 06/2023 showing gallbladder sludge and stricture in lower third of main duct suspected to be from pancreatitis with plastic stent placed into CBD; due for removal in 08/2023), peripheral artery disease (on PLETAL), and T2DM. She has a PSH significant for subtotal cholecystectomy (late 06/2023).      Hospital Course:   Patient was admitted to [facility] on 08/14 for management of posterior STEMI. She was taken to cath lab on admission and underwent PCI with three GINGER placement and initiated on PLAVIX. On 08/16, patient noted to develop worsening hyperbilirubinemia since admission (2 from 1.1), mildly elevated lipase (61), and transaminitis (, ALT 50's) associated with abdominal pain. CT A/P obtained on 08/16 showed multiple hepatic lesions, biliary stent in place with pneumobilia, evidence of prior cholecystectomy, and a mass measuring 4.7 X 3.3 cm at the duodenal/pancreatic head. AES consulted for further evaluation.      On initial bedside encounter, patient reports sudden onset of bilateral upper abdominal pain on 08/16. She reports symptom association with daily non-bloody emesis since approximately 05/2023 and approximately 30 pounds of unintentional weight loss and eye yellowing since spring of 2023. She denies symptom association with melena, hematochezia, pale colored  stools, family history of gastric, colon, and pancreatic cancer, and use of ETOH or cigarettes.       Subjective:     Interval History:   - Overnight became febrile to 103, hypoxic and tachycardic   - requiring bipap  [facility]  - [facility] up to 7.6, Alk Phos 743, AST//90    Review of Systems   Constitutional:  Positive for activity change, chills, fatigue and fever.   HENT:  Positive for trouble swallowing and voice change.    Respiratory:  Positive for shortness of breath.    Cardiovascular:  Negative for chest pain.   Gastrointestinal:  Positive for abdominal distention. Negative for abdominal pain, nausea and vomiting.   Skin:  Positive for color change.   Neurological:  Positive for weakness.     Objective:     Vital Signs (Most Recent):  Temp: 96.8 °F (36 °C) (08/23/23 1105)  Pulse: 71 (08/23/23 1205)  Resp: 15 (08/23/23 1205)  BP: (!) 106/44 (08/23/23 1205)  SpO2: 99 % (08/23/23 1205) Vital Signs (24h Range):  Temp:  [96.8 °F (36 °C)-103.1 °F (39.5 °C)] 96.8 °F (36 °C)  Pulse:  [] 71  Resp:  [15-29] 15  SpO2:  [94 %-100 %] 99 %  BP: ()/() 106/44     Weight: 61.1 kg (134 lb 11.2 oz) (08/23/23 0600)  Body mass index is 23.12 kg/m².      Intake/Output Summary (Last 24 hours) at 8/23/2023 1230  Last data filed at 8/23/2023 1205  Gross per 24 hour   Intake 2422.61 ml   Output 700 ml   Net 1722.61 ml       Lines/Drains/Airways       Drain  Duration             Female External Urinary Catheter 08/23/23 0515 <1 day              Peripheral Intravenous Line  Duration                  Peripheral IV - Single Lumen 08/16/23 0934 20 G;1 3/4 in Left Upper Arm 7 days         Peripheral IV - Single Lumen 08/19/23 1440 18 G;2 in Anterior;Right Upper Arm 3 days                     Physical Exam  Vitals and nursing note reviewed.   Constitutional:       Appearance: She is obese. She is ill-appearing.   Eyes:      Comments: Sclera jaundice   Cardiovascular:      Rate and Rhythm: Normal rate and  regular rhythm.      Pulses: Normal pulses.      Heart sounds: Normal heart sounds.   Pulmonary:      Comments: On bipap  Abdominal:      General: There is no distension.      Palpations: Abdomen is soft.      Tenderness: There is no abdominal tenderness.   Skin:     General: Skin is warm and dry.      Coloration: Skin is jaundiced.          Significant Labs:  CBC:   Recent Labs   Lab 08/22/23  0513 08/22/23  2341 08/23/23  0201 08/23/23  0410   WBC 10.52  --  11.78 11.88   HGB 8.8*  --  9.1* 11.0*   HCT 27.7* 34* 29.4* 35.0*     --  309 313     CMP:   Recent Labs   Lab 08/23/23  0201   *   CALCIUM 8.5*   ALBUMIN 1.5*   PROT 6.6      K 3.8   CO2 22*      BUN 12   CREATININE 0.9   ALKPHOS 743*   ALT 90*   *   BILITOT 7.6*     All pertinent lab results from the last 24 hours have been reviewed.      Significant Imaging:  Imaging results within the past 24 hours have been reviewed.    Assessment/Plan:     GI  Hyperbilirubinemia  This is a 63 year old female with a PMH significant for CAD (status post PCI in 2014), DVT (on Apixaban), MURTAZA, pancreatitis (diagnosed 05/2023; attributed to TRULICITY usage initially; status post EUS/ERCP in 06/2023 showing gallbladder sludge and stricture in lower third of main duct suspected to be from pancreatitis with plastic stent placed into CBD; due for removal in 08/2023), peripheral artery disease (on PLETAL), and T2DM and a PSH significant for subtotal cholecystectomy (late 06/2023) who was admitted to [facility] on 08/14 for posterior STEMI requiring PCI on admission and initiation of ASA and PLAVIX. Hospital course associated with onset of abdominal pain and progressive hyperbilirubinemia since 08/16 with CT A/P non-contrast from that time concerning for pancreatic head mass with multiple hepatic lesions concerning for underlying pancreatic cancer. She had a biliary stricture treated with biliary sphincterotomy, biliary stent, and cholecystectomy s/p  ERCP w/ metal stent on 8/17, abdominal pain has resolved but T bili is continuing to rise. Abdominal x-ray completed yesterday which did not show migration of the stent but today Tbili has risen again and thus we will repeat her ERCP to reassess. Overnight she became septic appearing with temp 103F, hypoxia, and tachycardia. She was transferred to MICU overnight for closer monitoring and pressors due to hypotension not responsive to IVF bolus.    Recommendations:     - Continue antibiotics for assumed biliary source  - CMP Daily - monitor Tbili and LFTs        Thank you for your consult. I will follow-up with patient. Please contact us if you have any additional questions.    CARLYLE LINDSEY NP  Gastroenterology  [facility]

## 2023-08-23 NOTE — ANESTHESIA POSTPROCEDURE EVALUATION
Anesthesia Post Evaluation    Patient: Mariann Huff    Procedure(s) Performed: Procedure(s) (LRB):  ERCP (ENDOSCOPIC RETROGRADE CHOLANGIOPANCREATOGRAPHY) (N/A)    Final Anesthesia Type: general      Patient location during evaluation: [facility]  Patient participation: Yes- Able to Participate  Level of consciousness: awake and alert  Post-procedure vital signs: reviewed and stable  Pain management: adequate  Airway patency: patent    PONV status at discharge: No PONV  Anesthetic complications: no      Cardiovascular status: hemodynamically stable  Respiratory status: unassisted, spontaneous ventilation and room air  Hydration status: euvolemic  Follow-up not needed.          Vitals Value Taken Time   /54 08/23/23 0947   Temp 37.1 °C (98.7 °F) 08/23/23 0705   Pulse 82 08/23/23 0951   Resp 20 08/23/23 0951   SpO2 98 % 08/23/23 0951   Vitals shown include unvalidated device data.      Event Time   Out of Recovery 08/22/2023 17:05:00         Pain/Derick Score: Pain Rating Prior to Med Admin: 0 (8/23/2023  1:08 AM)  Derick Score: 10 (8/22/2023  4:15 PM)

## 2023-08-24 DIAGNOSIS — C25.9 PANCREATIC ADENOCARCINOMA: Primary | ICD-10-CM

## 2023-08-24 LAB
ALBUMIN SERPL BCP-MCNC: 1.5 G/DL (ref 3.5–5.2)
ALP SERPL-CCNC: 843 U/L (ref 55–135)
ALT SERPL W/O P-5'-P-CCNC: 120 U/L (ref 10–44)
ANION GAP SERPL CALC-SCNC: 12 MMOL/L (ref 8–16)
ANISOCYTOSIS BLD QL SMEAR: SLIGHT
APTT PPP: 36.2 SEC (ref 21–32)
ASCENDING AORTA: 2.76 CM
AST SERPL-CCNC: 351 U/L (ref 10–40)
AV INDEX (PROSTH): 0.77
AV MEAN GRADIENT: 2 MMHG
AV PEAK GRADIENT: 4 MMHG
AV VALVE AREA BY VELOCITY RATIO: 2.69 CM²
AV VALVE AREA: 2.25 CM²
AV VELOCITY RATIO: 0.92
BACTERIA UR CULT: NORMAL
BASOPHILS # BLD AUTO: ABNORMAL K/UL (ref 0–0.2)
BASOPHILS NFR BLD: 0 % (ref 0–1.9)
BILIRUB SERPL-MCNC: 5.7 MG/DL (ref 0.1–1)
BSA FOR ECHO PROCEDURE: 1.66 M2
BUN SERPL-MCNC: 16 MG/DL (ref 8–23)
BURR CELLS BLD QL SMEAR: ABNORMAL
C DIFF GDH STL QL: NEGATIVE
C DIFF TOX A+B STL QL IA: NEGATIVE
CALCIUM SERPL-MCNC: 8.3 MG/DL (ref 8.7–10.5)
CHLORIDE SERPL-SCNC: 105 MMOL/L (ref 95–110)
CO2 SERPL-SCNC: 23 MMOL/L (ref 23–29)
CREAT SERPL-MCNC: 1 MG/DL (ref 0.5–1.4)
CV ECHO LV RWT: 0.33 CM
DIFFERENTIAL METHOD: ABNORMAL
DOHLE BOD BLD QL SMEAR: PRESENT
DOP CALC AO PEAK VEL: 0.99 M/S
DOP CALC AO VTI: 21.87 CM
DOP CALC LVOT AREA: 2.9 CM2
DOP CALC LVOT DIAMETER: 1.93 CM
DOP CALC LVOT PEAK VEL: 0.91 M/S
DOP CALC LVOT STROKE VOLUME: 49.18 CM3
DOP CALCLVOT PEAK VEL VTI: 16.82 CM
E WAVE DECELERATION TIME: 126.53 MSEC
E/A RATIO: 1.2
E/E' RATIO: 14.13 M/S
ECHO LV POSTERIOR WALL: 0.83 CM (ref 0.6–1.1)
EJECTION FRACTION: 40 %
EOSINOPHIL # BLD AUTO: ABNORMAL K/UL (ref 0–0.5)
EOSINOPHIL NFR BLD: 0 % (ref 0–8)
ERYTHROCYTE [DISTWIDTH] IN BLOOD BY AUTOMATED COUNT: 18.5 % (ref 11.5–14.5)
EST. GFR  (NO RACE VARIABLE): >60 ML/MIN/1.73 M^2
FRACTIONAL SHORTENING: 17 % (ref 28–44)
GLUCOSE SERPL-MCNC: 149 MG/DL (ref 70–110)
HCT VFR BLD AUTO: 30.7 % (ref 37–48.5)
HGB BLD-MCNC: 9.6 G/DL (ref 12–16)
HYPOCHROMIA BLD QL SMEAR: ABNORMAL
IMM GRANULOCYTES # BLD AUTO: ABNORMAL K/UL (ref 0–0.04)
IMM GRANULOCYTES NFR BLD AUTO: ABNORMAL % (ref 0–0.5)
INR PPP: 1.3 (ref 0.8–1.2)
INTERVENTRICULAR SEPTUM: 0.9 CM (ref 0.6–1.1)
LA MAJOR: 4.58 CM
LA MINOR: 4.98 CM
LA WIDTH: 4.35 CM
LEFT ATRIUM SIZE: 3.61 CM
LEFT ATRIUM VOLUME INDEX MOD: 38.7 ML/M2
LEFT ATRIUM VOLUME INDEX: 38.6 ML/M2
LEFT ATRIUM VOLUME MOD: 63.87 CM3
LEFT ATRIUM VOLUME: 63.69 CM3
LEFT INTERNAL DIMENSION IN SYSTOLE: 4.16 CM (ref 2.1–4)
LEFT VENTRICLE DIASTOLIC VOLUME INDEX: 72.52 ML/M2
LEFT VENTRICLE DIASTOLIC VOLUME: 119.66 ML
LEFT VENTRICLE MASS INDEX: 92 G/M2
LEFT VENTRICLE SYSTOLIC VOLUME INDEX: 46.4 ML/M2
LEFT VENTRICLE SYSTOLIC VOLUME: 76.62 ML
LEFT VENTRICULAR INTERNAL DIMENSION IN DIASTOLE: 5.03 CM (ref 3.5–6)
LEFT VENTRICULAR MASS: 151.73 G
LV LATERAL E/E' RATIO: 13.25 M/S
LV SEPTAL E/E' RATIO: 15.14 M/S
LYMPHOCYTES # BLD AUTO: ABNORMAL K/UL (ref 1–4.8)
LYMPHOCYTES NFR BLD: 2 % (ref 18–48)
MAGNESIUM SERPL-MCNC: 2.2 MG/DL (ref 1.6–2.6)
MCH RBC QN AUTO: 26.4 PG (ref 27–31)
MCHC RBC AUTO-ENTMCNC: 31.3 G/DL (ref 32–36)
MCV RBC AUTO: 84 FL (ref 82–98)
MONOCYTES # BLD AUTO: ABNORMAL K/UL (ref 0.3–1)
MONOCYTES NFR BLD: 5 % (ref 4–15)
MV PEAK A VEL: 0.88 M/S
MV PEAK E VEL: 1.06 M/S
MV STENOSIS PRESSURE HALF TIME: 36.69 MS
MV VALVE AREA P 1/2 METHOD: 6 CM2
NEUTROPHILS NFR BLD: 77 % (ref 38–73)
NEUTS BAND NFR BLD MANUAL: 16 %
NRBC BLD-RTO: 0 /100 WBC
OVALOCYTES BLD QL SMEAR: ABNORMAL
PLATELET # BLD AUTO: 290 K/UL (ref 150–450)
PLATELET BLD QL SMEAR: ABNORMAL
PLATELET RESPONSE PLAVIX: 170 PRU (ref 194–418)
PMV BLD AUTO: 12.2 FL (ref 9.2–12.9)
POCT GLUCOSE: 140 MG/DL (ref 70–110)
POCT GLUCOSE: 163 MG/DL (ref 70–110)
POCT GLUCOSE: 172 MG/DL (ref 70–110)
POCT GLUCOSE: 204 MG/DL (ref 70–110)
POIKILOCYTOSIS BLD QL SMEAR: SLIGHT
POLYCHROMASIA BLD QL SMEAR: ABNORMAL
POTASSIUM SERPL-SCNC: 4 MMOL/L (ref 3.5–5.1)
PROT SERPL-MCNC: 7 G/DL (ref 6–8.4)
PROTHROMBIN TIME: 14.2 SEC (ref 9–12.5)
RA MAJOR: 3.67 CM
RA PRESSURE ESTIMATED: 3 MMHG
RA WIDTH: 3.24 CM
RBC # BLD AUTO: 3.64 M/UL (ref 4–5.4)
RIGHT VENTRICULAR END-DIASTOLIC DIMENSION: 2.82 CM
SINUS: 2.76 CM
SODIUM SERPL-SCNC: 140 MMOL/L (ref 136–145)
SPHEROCYTES BLD QL SMEAR: ABNORMAL
STJ: 1.64 CM
TDI LATERAL: 0.08 M/S
TDI SEPTAL: 0.07 M/S
TDI: 0.08 M/S
TOXIC GRANULES BLD QL SMEAR: PRESENT
TRICUSPID ANNULAR PLANE SYSTOLIC EXCURSION: 1.14 CM
WBC # BLD AUTO: 13.5 K/UL (ref 3.9–12.7)
WBC TOXIC VACUOLES BLD QL SMEAR: PRESENT
Z-SCORE OF LEFT VENTRICULAR DIMENSION IN END DIASTOLE: 0.82
Z-SCORE OF LEFT VENTRICULAR DIMENSION IN END SYSTOLE: 2.93

## 2023-08-24 PROCEDURE — 63600175 PHARM REV CODE 636 W HCPCS: Performed by: INTERNAL MEDICINE

## 2023-08-24 PROCEDURE — 25500020 PHARM REV CODE 255

## 2023-08-24 PROCEDURE — 99232 SBSQ HOSP IP/OBS MODERATE 35: CPT | Mod: GC,,, | Performed by: INTERNAL MEDICINE

## 2023-08-24 PROCEDURE — 87449 NOS EACH ORGANISM AG IA: CPT | Performed by: EMERGENCY MEDICINE

## 2023-08-24 PROCEDURE — 87427 SHIGA-LIKE TOXIN AG IA: CPT | Mod: 59 | Performed by: EMERGENCY MEDICINE

## 2023-08-24 PROCEDURE — 63600175 PHARM REV CODE 636 W HCPCS: Performed by: STUDENT IN AN ORGANIZED HEALTH CARE EDUCATION/TRAINING PROGRAM

## 2023-08-24 PROCEDURE — 25000003 PHARM REV CODE 250: Performed by: INTERNAL MEDICINE

## 2023-08-24 PROCEDURE — 85027 COMPLETE CBC AUTOMATED: CPT | Performed by: STUDENT IN AN ORGANIZED HEALTH CARE EDUCATION/TRAINING PROGRAM

## 2023-08-24 PROCEDURE — 27100171 HC OXYGEN HIGH FLOW UP TO 24 HOURS

## 2023-08-24 PROCEDURE — 94761 N-INVAS EAR/PLS OXIMETRY MLT: CPT

## 2023-08-24 PROCEDURE — 80053 COMPREHEN METABOLIC PANEL: CPT

## 2023-08-24 PROCEDURE — 85007 BL SMEAR W/DIFF WBC COUNT: CPT | Performed by: STUDENT IN AN ORGANIZED HEALTH CARE EDUCATION/TRAINING PROGRAM

## 2023-08-24 PROCEDURE — 63600175 PHARM REV CODE 636 W HCPCS

## 2023-08-24 PROCEDURE — 83735 ASSAY OF MAGNESIUM: CPT | Performed by: STUDENT IN AN ORGANIZED HEALTH CARE EDUCATION/TRAINING PROGRAM

## 2023-08-24 PROCEDURE — 85576 BLOOD PLATELET AGGREGATION: CPT

## 2023-08-24 PROCEDURE — 27000207 HC ISOLATION

## 2023-08-24 PROCEDURE — 87046 STOOL CULTR AEROBIC BACT EA: CPT | Performed by: EMERGENCY MEDICINE

## 2023-08-24 PROCEDURE — 99232 PR SUBSEQUENT HOSPITAL CARE,LEVL II: ICD-10-PCS | Mod: GC,,, | Performed by: INTERNAL MEDICINE

## 2023-08-24 PROCEDURE — 99900035 HC TECH TIME PER 15 MIN (STAT)

## 2023-08-24 PROCEDURE — 87045 FECES CULTURE AEROBIC BACT: CPT | Performed by: EMERGENCY MEDICINE

## 2023-08-24 PROCEDURE — 87449 NOS EACH ORGANISM AG IA: CPT | Mod: 91 | Performed by: EMERGENCY MEDICINE

## 2023-08-24 PROCEDURE — 20600001 HC STEP DOWN PRIVATE ROOM

## 2023-08-24 PROCEDURE — 85730 THROMBOPLASTIN TIME PARTIAL: CPT | Performed by: INTERNAL MEDICINE

## 2023-08-24 PROCEDURE — 85610 PROTHROMBIN TIME: CPT | Performed by: STUDENT IN AN ORGANIZED HEALTH CARE EDUCATION/TRAINING PROGRAM

## 2023-08-24 RX ORDER — ONDANSETRON 2 MG/ML
4 INJECTION INTRAMUSCULAR; INTRAVENOUS EVERY 6 HOURS PRN
Status: DISCONTINUED | OUTPATIENT
Start: 2023-08-24 | End: 2023-08-29

## 2023-08-24 RX ADMIN — HUMAN ALBUMIN MICROSPHERES AND PERFLUTREN 0.11 MG: 10; .22 INJECTION, SOLUTION INTRAVENOUS at 10:08

## 2023-08-24 RX ADMIN — CLOPIDOGREL BISULFATE 75 MG: 75 TABLET ORAL at 10:08

## 2023-08-24 RX ADMIN — CEFEPIME 2 G: 2 INJECTION, POWDER, FOR SOLUTION INTRAVENOUS at 12:08

## 2023-08-24 RX ADMIN — INSULIN ASPART 4 UNITS: 100 INJECTION, SOLUTION INTRAVENOUS; SUBCUTANEOUS at 10:08

## 2023-08-24 RX ADMIN — CEFEPIME 2 G: 2 INJECTION, POWDER, FOR SOLUTION INTRAVENOUS at 01:08

## 2023-08-24 RX ADMIN — ASPIRIN 81 MG: 81 TABLET, COATED ORAL at 10:08

## 2023-08-24 RX ADMIN — ENOXAPARIN SODIUM 40 MG: 40 INJECTION SUBCUTANEOUS at 04:08

## 2023-08-24 RX ADMIN — ESCITALOPRAM OXALATE 10 MG: 10 TABLET ORAL at 10:08

## 2023-08-24 RX ADMIN — PROCHLORPERAZINE MALEATE 5 MG: 5 TABLET ORAL at 09:08

## 2023-08-24 RX ADMIN — PROCHLORPERAZINE MALEATE 5 MG: 5 TABLET ORAL at 05:08

## 2023-08-24 RX ADMIN — ATORVASTATIN CALCIUM 40 MG: 40 TABLET, FILM COATED ORAL at 08:08

## 2023-08-24 RX ADMIN — VANCOMYCIN HYDROCHLORIDE 1000 MG: 1 INJECTION, POWDER, LYOPHILIZED, FOR SOLUTION INTRAVENOUS at 06:08

## 2023-08-24 NOTE — PHYSICIAN QUERY
PT Name: Mariann Huff  MR #: [id number]    DOCUMENTATION CLARIFICATION     CDS/: Tere WestfallRN               Contact information: [email or web address]    This form is a permanent document in the medical record.     Query Date: August 24, 2023    By submitting this query, we are merely seeking further clarification of documentation.. Please utilize your independent clinical judgment when addressing the question(s) below.    The medical record contains the following:   Indicators  Supporting Clinical Findings Location in Medical Record   x Registered Dietician Diagnosis Moderate Protein-Calorie Malnutrition  Malnutrition in the context of Chronic Illness/Injury   Nutrition PN 8/24   x Energy Intake <75% of estimated energy requirement for > 1 month   Nutrition PN 8/24   x Weight Loss 15% x 9-10 months   Nutrition PN 8/24   x Fat Loss moderate depletion of orbitals and triceps   Nutrition PN 8/24   x Muscle Loss moderate depletion of temples and clavicle region   Nutrition PN 8/24    Edema/Fluid Accumulation      Reduced  Strength (by dynamometer)     x Weight, BMI, Usual Body Weight Weight: 60.8 kg (134 lb 0.6 oz)  Weight (lb): 134.04 lb  BMI (Calculated): 23  % Usual Body Weight: 85.21   Nutrition PN 8/24    Delayed Wound Healing     x Acute or Chronic Illness Mariann Huff is a 63 y.o. female with CAD s/p GINGER to ostial LAD in 2014, HTN, HLD, DM2, MURTAZA who presented to the ED to the ER after a near syncopal event at home around 4 pm. She also  feels tired and her appetite lately has decreased. she had chest pain and visceral symptoms with nausea and vomiting   H&P 8/15    Social or Environmental Circumstances     x Treatment Recommendations   Continue Diabetic/Low Na diet, fluid restriction per MD. Encourage PO intake as tolerated.   ResQU ONS added BID (milk/dairy free).   RD to monitor & follow-up. Nutrition PN 8/24              Other       Academy of Nutrition and Dietetics (Academy) and  the American Society for Parenteral and Enteral Nutrition (A.S.P.E.N.) Clinical Characteristics to support Malnutrition      Criteria for mild malnutrition is defined as 1 characteristic outlined above within the established moderate or severe parameters.  A minimum of 2 out of the 6 characteristics noted above are recommended for a diagnosis of moderate or severe malnutrition.  Chronic illness/injury is a disease/condition lasting 3 months or longer.    The noted clinical guidelines are only system guidelines and do not replace the providers clinical judgment.    Provider, please specify diagnosis  associated with above clinical findings.    [ X ] Moderate Malnutrition - a minimum of 2 of the 6 moderate malnutrition characteristics noted above      [  ] Other Nutritional Diagnosis (please specify): _______         Please document in your progress notes daily for the duration of treatment until resolved and  include in your discharge summary.      References:    MACKENZIE Olsen, & JOSE J Wade (2022, April). Assessment and management of anorexia and cachexia in palliative care. Retrieved May 23, 2022, from https://www.[employer]/contents/assessment-and-management-of-anorexia-and-cachexia-in-palliative-care?lebkjShm=7721&source=see_link     DANISHA Rader, PhD, RD, Leyla ESPINOZA P., PhD, RN, SHANNAN Andujar MD, PhD, Shyam SALVADOR A., MS, RD, [facility], HA Arias, MS, RD, The Academy Malnutrition Work Group, The A.S.P.E.N. Board of Directors. (2012). Consensus Statement: Academy of Nutrition and Dietetics and American Society for Parenteral and Enteral Nutrition: Characteristics Recommended for the Identification and Documentation of Adult Malnutrition (Undernutrition). Journal of Parenteral and Enteral Nutrition, 36(3), 275-283. doi:10.1177/9014563063544995     Form No. 29987

## 2023-08-24 NOTE — ASSESSMENT & PLAN NOTE
63 yoF w/ CAD s/p GINGER to ostial LAD 2014 and more recent posterior STEMI s/p C 8/3 without intervention and 8/14 w/ thrombectomy and GINGER to ramus intermedius and noted mild ISR of previous LAD stents presenting w/ up-trending troponin in the setting of worsening septic shock secondary to recurrent cholangitis 2/2 biliary obstruction from newly diagnosed poorly differentiated carcinoma. Patient without chest pain. ECG with unchanged previously noted T-wave inversions in leads aVL and I, otherwise no new ischemic changes. Overall suspect type II NSTEMI in the setting of septic shock. However, worsening in-stent stenosis not excluded. Would recommend repeat TTE to assess of new regional wall motion abnormalities. Agree with continued DAPT, would hold on heparin gtt given likely type II NSTEMI.    8/24: Patient remains without chest pain. Troponin down-trending.    TTE 8/24 AM:    Left Ventricle: The left ventricle is normal in size. Normal wall thickness. regional wall motion abnormalities present. There is mildly reduced systolic function. Ejection fraction by visual approximation is 40%. Grade II diastolic dysfunction.    Right Ventricle: Right ventricle was not well visualized due to poor acoustic window. Normal right ventricular cavity size. Systolic function is normal.    Left Atrium: Left atrium is mildly dilated.    Mitral Valve: There is mild regurgitation.    IVC/SVC: Normal venous pressure at 3 mmHg.    Wall motion abnormalities on TTE report not significantly changed from prior TTE. Recommend continued medical management. Suspect type II NSTEMI from sepsis rather than plaque rupture or significant re-stenosis at this time.    -- Continue Aspirin and Clopidogrel  -- Adequate response to Clopidogrel on platelet response test; okay to continue as PGY12 inhibitor  -- Outpatient general cardiology follow-up on discharge

## 2023-08-24 NOTE — PROGRESS NOTES
[facility]  Cardiology  Progress Note    Patient Name: Mariann Huff  MRN: [id number]  Admission Date: 8/14/2023  Hospital Length of Stay: 10 days  Code Status: Full Code   Attending Physician: Sean Shahid MD   Primary Care Physician: Jasbir Haney MD  Expected Discharge Date: 8/28/2023  Principal Problem:Shock    Subjective:       Interval History: No acute overnight events. Patient's troponin down-trending. Patient continues to deny chest pain, palpitations, or shortness of breath. TTE performed this AM with EF unchanged at 40%. Will plan for medical management of suspected type II NSTEMI.    Review of Systems   Constitutional: Positive for decreased appetite and malaise/fatigue. Negative for chills and fever.   HENT: Negative.     Cardiovascular:  Negative for chest pain, leg swelling and palpitations.   Respiratory: Negative.  Negative for cough and shortness of breath.    Musculoskeletal: Negative.    Gastrointestinal:  Negative for abdominal pain, nausea and vomiting.   Genitourinary: Negative.    Neurological: Negative.    Psychiatric/Behavioral:  Negative for altered mental status. The patient is not nervous/anxious.      Objective:     Vital Signs (Most Recent):  Temp: 98.9 °F (37.2 °C) (08/24/23 0400)  Pulse: 98 (08/24/23 0600)  Resp: (!) 22 (08/24/23 0600)  BP: 134/72 (08/24/23 0600)  SpO2: 100 % (08/24/23 0600) Vital Signs (24h Range):  Temp:  [96.8 °F (36 °C)-98.9 °F (37.2 °C)] 98.9 °F (37.2 °C)  Pulse:  [] 98  Resp:  [15-38] 22  SpO2:  [97 %-100 %] 100 %  BP: ()/() 134/72     Weight: 61.1 kg (134 lb 11.2 oz)  Body mass index is 23.12 kg/m².     SpO2: 100 %         Intake/Output Summary (Last 24 hours) at 8/24/2023 0906  Last data filed at 8/24/2023 0645  Gross per 24 hour   Intake 870.6 ml   Output 653 ml   Net 217.6 ml       Lines/Drains/Airways       Drain  Duration                  Urethral Catheter 08/23/23 1338 16 Fr. <1 day              Peripheral  Intravenous Line  Duration                  Peripheral IV - Single Lumen 08/19/23 1440 18 G;2 in Anterior;Right Upper Arm 4 days         Peripheral IV - Single Lumen 08/23/23 1704 20 G Anterior;Right Forearm <1 day                    Physical Exam  Vitals and nursing note reviewed.   Constitutional:       General: She is not in acute distress.     Appearance: She is not toxic-appearing or diaphoretic.   HENT:      Head: Normocephalic and atraumatic.      Mouth/Throat:      Mouth: Mucous membranes are moist.      Pharynx: Oropharynx is clear.   Eyes:      Extraocular Movements: Extraocular movements intact.      Conjunctiva/sclera: Conjunctivae normal.   Cardiovascular:      Rate and Rhythm: Regular rhythm. Tachycardia present.      Heart sounds: No murmur heard.     No gallop.   Pulmonary:      Effort: Pulmonary effort is normal. No respiratory distress.      Breath sounds: No wheezing.   Abdominal:      General: There is no distension.      Palpations: Abdomen is soft.      Tenderness: There is generalized abdominal tenderness. There is no guarding.   Musculoskeletal:         General: No tenderness. Normal range of motion.      Cervical back: Normal range of motion and neck supple.      Right lower leg: No edema.      Left lower leg: No edema.   Skin:     General: Skin is warm and dry.   Neurological:      Mental Status: She is alert and oriented to person, place, and time. Mental status is at baseline.      Cranial Nerves: No dysarthria.   Psychiatric:         Mood and Affect: Mood normal.         Behavior: Behavior normal.         Significant Labs: CMP   Recent Labs   Lab 08/23/23  0104 08/23/23  0201 08/24/23  0418    138 140   K 4.2 3.8 4.0    106 105   CO2 19* 22* 23   * 134* 149*   BUN 10 12 16   CREATININE 0.8 0.9 1.0   CALCIUM 8.6* 8.5* 8.3*   PROT  --  6.6 7.0   ALBUMIN  --  1.5* 1.5*   BILITOT  --  7.6* 5.7*   ALKPHOS  --  743* 843*   AST  --  172* 351*   ALT  --  90* 120*   ANIONGAP 16  10 12    and Troponin   Recent Labs   Lab 08/23/23  0201 08/23/23  0410 08/23/23  1833   TROPONINI 6.090* 8.147* 4.729*     Assessment and Plan:     NSTEMI (non-ST elevated myocardial infarction)  63 yoF w/ CAD s/p GINGER to ostial LAD 2014 and more recent posterior STEMI s/p C 8/3 without intervention and 8/14 w/ thrombectomy and GINGER to ramus intermedius and noted mild ISR of previous LAD stents presenting w/ up-trending troponin in the setting of worsening septic shock secondary to recurrent cholangitis 2/2 biliary obstruction from newly diagnosed poorly differentiated carcinoma. Patient without chest pain. ECG with unchanged previously noted T-wave inversions in leads aVL and I, otherwise no new ischemic changes. Overall suspect type II NSTEMI in the setting of septic shock. However, worsening in-stent stenosis not excluded. Would recommend repeat TTE to assess of new regional wall motion abnormalities. Agree with continued DAPT, would hold on heparin gtt given likely type II NSTEMI.    8/24: Patient remains without chest pain. Troponin down-trending.    TTE 8/24 AM:    Left Ventricle: The left ventricle is normal in size. Normal wall thickness. regional wall motion abnormalities present. There is mildly reduced systolic function. Ejection fraction by visual approximation is 40%. Grade II diastolic dysfunction.    Right Ventricle: Right ventricle was not well visualized due to poor acoustic window. Normal right ventricular cavity size. Systolic function is normal.    Left Atrium: Left atrium is mildly dilated.    Mitral Valve: There is mild regurgitation.    IVC/SVC: Normal venous pressure at 3 mmHg.    Regional wall motion abnormalities on TTE not significantly changed from prior echo. Recommend continued medical management. Suspect type II NSTEMI from sepsis rather than plaque rupture or significant re-stenosis at this time.    -- Continue Aspirin and Clopidogrel  -- Adequate response to Clopidogrel on platelet  response test; okay to continue as PGY12 inhibitor  -- Outpatient general cardiology follow-up on discharge      VTE Risk Mitigation (From admission, onward)           Ordered     enoxaparin injection 40 mg  Every 24 hours         08/23/23 1026                  Cardiology consults will sign off at this time. Please re-consult for additional questions or change in clinical status.    Richard Lan MD  Cardiology  [facility] - Cardiac Medical ICU

## 2023-08-24 NOTE — RESIDENT HANDOFF
ICU Transfer of Care Note  Critical Care Medicine    Admit Date: 8/14/2023  LOS: 10    CC: Shock    Code Status: Full Code     Transfer to Hospital Medicine discussed with [facility] Med T     HPI and Hospital Course:     HPI:  63 y.o. female with CAD s/p GINGER to ostial LAD in 2014, HTN, HLD, DM2, MURTAZA who presented to the ED to the ER after a near syncopal event at home around 4 pm. She also feels tired and her appetite lately has decreased. she had chest pain and visceral symptoms with nausea and vomiting. She reports losing weight as well.She has had abdominal symptoms with poor PO intake leading to orthostasis. Today she had another near syncopal event and is why she was brought to the ER. Here her ECG showed the posterior changes for which posterior EKG was obtained with showed STEMI. Bedside echo showed an EF 45-50% and norma-lateral and infero-lateral hypokinesis. She was taken to cath lab and her symptoms resolved after PCI. Please refer to PCI for full details.     Patient subsequently developed acute abdominal pain associated with nausea and vomiting. Of note, patient was recently admitted (6/23) for suspected biliary pancreatitis and biliary stricture treated with biliary sphincterotomy, biliary stent, and cholecystectomy. Given worsening transaminitis w/ elevated T-Bili, concern for stent occlusion. Patient had ERCP and EUS on 8/17/23. Visualized a partial occluded stent in the biliary tree. One covered metal stent was placed into the common bile duct. Concern for metastatic cancer is high due to multiple liver lesions and mass on pancreatic head per AES. S/p FNA biopsy with ERCP and stent placement on 8/17.      T bili continued to rise on 8/21, patient underwent ERCP again on 8/22 which demonstrated a visibly ocluded stent in the biliary tree with a single biliary stricture in main BD. One covered metal stent placed in CBD. Several hours following this procedure, patient developed rapid onset fever to 103  and rapid drop in BP from SBP 180s to SBP 80s. Patient admitted to MICU for further management.     Hospital/ICU Course:  No notes on file      To Follow Up:     NSTEMI- Cardiology has been consulted. Can continue ASA/Plavix for now  T2DM- Continue 3 units Novolog 3 units TID  Sepsis- Continue broad spectrum antibiotics. Recent biliary stent was exchanged on 08/17 with pus seen.       Discharge Plan:       Call with questions at [phone].

## 2023-08-24 NOTE — PROGRESS NOTES
[facility]  Adult Nutrition  Progress Note    SUMMARY       Recommendations    Continue Diabetic/Low Na diet, fluid restriction per MD. Encourage PO intake as tolerated.  [employer] ONS added BID (milk/dairy free).  RD to monitor & follow-up.    Goals: Meet % EEN, EPN by RD f/u date  Nutrition Goal Status: new  Communication of RD Recs: reviewed with RN    Assessment and Plan    Moderate malnutrition    Nutrition Problem:  Moderate Protein-Calorie Malnutrition  Malnutrition in the context of Chronic Illness/Injury    Related to (etiology):  Inability to consume sufficient energy     Signs and Symptoms (as evidenced by):  Energy Intake: <75% of estimated energy requirement for > 1 month   Body Fat Depletion: moderate depletion of orbitals and triceps   Muscle Mass Depletion: moderate depletion of temples and clavicle region   Weight Loss: 15% x 9-10 months    Interventions(treatment strategy):  Collaboration of nutrition care w/ other providers    Nutrition Diagnosis Status:  New    Malnutrition Assessment    Malnutrition Context: chronic illness  Malnutrition Level: moderate    Weight Loss (Malnutrition): other (see comments) (15% x 9-10 months)  Energy Intake (Malnutrition): less than 75% for greater than or equal to 1 month  Subcutaneous Fat (Malnutrition): moderate depletion  Muscle Mass (Malnutrition): moderate depletion     Reason for Assessment    Reason For Assessment: length of stay  Diagnosis: other (see comments) (Shock)  Relevant Medical History: HTN, HLD, DM  Interdisciplinary Rounds: attended    General Information Comments: Pt w/ poor appetite, consuming 25% of meals. Pt w/ decreased appetite PTA & UBW of 155#. Pt meets criteria for moderate malnutrition; please see PES statement for details. NFPE complete today.  Nutrition Discharge Planning: Adequate PO intake    Nutrition/Diet History    Spiritual, Cultural Beliefs, Restoration Practices, Values that Affect Care: no  Food  "Allergies: milk  Factors Affecting Nutritional Intake: decreased appetite    Anthropometrics    Temp: 98.9 °F (37.2 °C)  Height: 5' 4" (162.6 cm)  Height (inches): 64 in  Weight Method: Bed Scale  Weight: 60.8 kg (134 lb 0.6 oz)  Weight (lb): 134.04 lb  Ideal Body Weight (IBW), Female: 120 lb  % Ideal Body Weight, Female (lb): 111.7 %  BMI (Calculated): 23  BMI Grade: 18.5-24.9 - normal  Usual Body Weight (UBW), k.5 kg  % Usual Body Weight: 85.21  % Weight Change From Usual Weight: -14.97 %    Lab/Procedures/Meds    Pertinent Labs Reviewed: reviewed  Pertinent Labs Comments: A1C 8.8 (7/10)  Pertinent Medications Reviewed: reviewed  Pertinent Medications Comments: Statin    Estimated/Assessed Needs    Weight Used For Calorie Calculations: 60.8 kg (134 lb 0.6 oz)    Energy Calorie Requirements (kcal): 1824 kcal/d  Energy Need Method: Kcal/kg (30 kcal/kg)    Protein Requirements: 79 g/d (1.3 g/kg)  Weight Used For Protein Calculations: 60.8 kg (134 lb 0.6 oz)    Estimated Fluid Requirement Method: other (see comments) (Per MD or 1 mL/kcal)  RDA Method (mL): 1824    Nutrition Prescription Ordered    Current Diet Order: 2000 kcal ADA, Low Na  Nutrition Order Comments: 2000 mL FR    Evaluation of Received Nutrient/Fluid Intake    I/O: +4.1L since admit    Comments: LBM:     Tolerance: tolerating    Nutrition Risk    Level of Risk/Frequency of Follow-up:  (1x/week)     Monitor and Evaluation    Food and Nutrient Intake: food and beverage intake, energy intake  Food and Nutrient Adminstration: diet order  Physical Activity and Function: nutrition-related ADLs and IADLs  Anthropometric Measurements: weight, weight change  Biochemical Data, Medical Tests and Procedures: glucose/endocrine profile, lipid profile, inflammatory profile, gastrointestinal profile, electrolyte and renal panel  Nutrition-Focused Physical Findings: overall appearance     Nutrition Follow-Up    RD Follow-up?: Yes      "

## 2023-08-24 NOTE — CARE UPDATE
BG goal 140-180    -A1C   Lab Results   Component Value Date    HGBA1C 8.8 (H) 07/10/2023       -HOME REGIMEN: Glipizide ER 10mg before breakfast. Levemir 2u daily on a sliding scale     -INPATIENT REGIMEN: Novolog Correction Scale     -GLUCOSE TREND FOR THE PAST 24HRS: 100-288    -NO HYPOGYCEMIAS NOTED     -TOLERATING 75% OF PO DIET     -Diet: Diet diabetic Low Sodium,2gm; 2000 Calorie; Fluid - 2000mL      -Steroids -  N/A     -Tube Feeds - N/A       Remains in CSU. NAEON. BG trending up above goal ranges on current prn correction scale.       Plan:  - Novolog 3 units TID with meals (0.3 u/kg dosing)   -Continue Moderate Dose Correction Scale  -BG monitoring ac/hs    Discharge planning: TBD    Endocrine to continue to follow    ** Please call Endocrine for any BG related issues **

## 2023-08-24 NOTE — PLAN OF CARE
[facility]  Discharge Reassessment    Primary Care Provider: Jasbir Haney MD    Expected Discharge Date: 8/28/2023    Reassessment (most recent)       Discharge Reassessment - 08/24/23 1058          Discharge Reassessment    Assessment Type Discharge Planning Reassessment     Did the patient's condition or plan change since previous assessment? Yes     Discharge Plan discussed with: Spouse/sig other;Patient     Name(s) and Number(s) Shamir Huff, spouse/cp# [phone]     Communicated REBECCA with patient/caregiver Date not available/Unable to determine     Discharge Plan A Home Health     Discharge Plan B Home with family     DME Needed Upon Discharge  other (see comments)   TBD    Transition of Care Barriers None     Why the patient remains in the hospital Requires continued medical care        Post-Acute Status    Post-Acute Authorization Home Health     Home Health Status Pending medical clearance/testing     Coverage Medicare A&B and BCBS all out of state     Discharge Delays None known at this time                   Patient not stable for discharge at this time.  Possible step down to hospital medicine if stable.  SW will continue to follow patient's progress to discharge from MICU.  SW remains available for any patient or family concerns or needs.    Kristine Brandt LMSW  [facility]  [phone]

## 2023-08-24 NOTE — PLAN OF CARE
[facility] Medicine ICU Acceptance Note    Date of Admit: 8/14/2023  Date of Transfer / Stepdown: 8/24/2023  David, JOVANY/J, L, Onc (IV chemo w/in 1 month), Gyn/Onc, or other special case?: no   ICU team stepping patient down: MICU,   Accepting  team: [facility] Medicine C    Brief History of Present Illness:      63 y.o. female with CAD s/p GINGER to ostial LAD in 2014, HTN, HLD, DM2, MURTAZA who presented to the ED to the ER after a near syncopal event at home around 4 pm. She also feels tired and her appetite lately has decreased. she had chest pain and visceral symptoms with nausea and vomiting. She reports losing weight as well.She has had abdominal symptoms with poor PO intake leading to orthostasis. Today she had another near syncopal event and is why she was brought to the ER. Here her ECG showed the posterior changes for which posterior EKG was obtained with showed STEMI. Bedside echo showed an EF 45-50% and norma-lateral and infero-lateral hypokinesis. She was taken to cath lab and her symptoms resolved after PCI. Please refer to PCI for full details.     Hospital/ICU Course:     Patient subsequently developed acute abdominal pain associated with nausea and vomiting. Of note, patient was recently admitted (6/23) for suspected biliary pancreatitis and biliary stricture treated with biliary sphincterotomy, biliary stent, and cholecystectomy. Given worsening transaminitis w/ elevated T-Bili, concern for stent occlusion. Patient had ERCP and EUS on 8/17/23. Visualized a partial occluded stent in the biliary tree. One covered metal stent was placed into the common bile duct. Concern for metastatic cancer is high due to multiple liver lesions and mass on pancreatic head per AES. S/p FNA biopsy with ERCP and stent placement on 8/17.      T bili continued to rise on 8/21, patient underwent ERCP again on 8/22 which demonstrated a visibly ocluded stent in the biliary tree with a single biliary stricture in main BD. One  covered metal stent placed in CBD. Several hours following this procedure, patient developed rapid onset fever to 103 and rapid drop in BP from SBP 180s to SBP 80s. Patient admitted to MICU for further management. She was continued on vanc/cefepime, briefly on levophed and then weaned off. Still with intermittent AMS    Consultants and Procedures:     Consultants:  Cardiology, AES, oncology    Procedures:    ERCP w/ stent placement and biopsy, LHC w/ PCI    Transfer Information:     Diet:  Ordered    Physical Activity:  OT/PT Ordered    To Do / Pending Studies / Follow ups:  - Trend mental status  - Outline course of abx for intra-abdominal infection from likely biliary source  - trend T bili to ensure it is improving  - Referral to oncology at discharge       Patient has been accepted by Hospital Medicine Team C, who will assume care of the patient upon arrival to the floor from the ICU. Please contact ICU team with any concerns prior to arrival. Please contact Hospital Medicine at [phone] or [phone] (please do NOT leave a voicemail) when patient arrives to the floor.    Baudilio Lozada M.D.  Department of Hospital Medicine  [facility]  Pager: [phone]  Spect: [phone]

## 2023-08-24 NOTE — PLAN OF CARE
Recommendations     Continue Diabetic/Low Na diet, fluid restriction per MD. Encourage PO intake as tolerated.  [email or web address] ONS added BID (milk/dairy free).  RD to monitor & follow-up.     Goals: Meet % EEN, EPN by RD f/u date  Nutrition Goal Status: new  Communication of RD Recs: reviewed with RN

## 2023-08-24 NOTE — ASSESSMENT & PLAN NOTE
Nutrition Problem:  Moderate Protein-Calorie Malnutrition  Malnutrition in the context of Chronic Illness/Injury    Related to (etiology):  Inability to consume sufficient energy     Signs and Symptoms (as evidenced by):  Energy Intake: <75% of estimated energy requirement for > 1 month   Body Fat Depletion: moderate depletion of orbitals and triceps   Muscle Mass Depletion: moderate depletion of temples and clavicle region   Weight Loss: 15% x 9-10 months    Interventions(treatment strategy):  Collaboration of nutrition care w/ other providers    Nutrition Diagnosis Status:  New     [Alert] : alert [No Acute Distress] : no acute distress [Normocephalic] : normocephalic [Anterior Glenn Closed] : anterior fontanelle closed [Red Reflex Bilateral] : red reflex bilateral [PERRL] : PERRL [Normally Placed Ears] : normally placed ears [Auricles Well Formed] : auricles well formed [Clear Tympanic membranes with present light reflex and bony landmarks] : clear tympanic membranes with present light reflex and bony landmarks [No Discharge] : no discharge [Nares Patent] : nares patent [Palate Intact] : palate intact [Uvula Midline] : uvula midline [Tooth Eruption] : tooth eruption  [Supple, full passive range of motion] : supple, full passive range of motion [No Palpable Masses] : no palpable masses [Symmetric Chest Rise] : symmetric chest rise [Clear to Auscultation Bilaterally] : clear to auscultation bilaterally [Regular Rate and Rhythm] : regular rate and rhythm [S1, S2 present] : S1, S2 present [No Murmurs] : no murmurs [+2 Femoral Pulses] : +2 femoral pulses [Soft] : soft [NonTender] : non tender [Non Distended] : non distended [Normoactive Bowel Sounds] : normoactive bowel sounds [No Hepatomegaly] : no hepatomegaly [No Splenomegaly] : no splenomegaly [Chandan 1] : Chandan 1 [No Clitoromegaly] : no clitoromegaly [Normal Vaginal Introitus] : normal vaginal introitus [Patent] : patent [Normally Placed] : normally placed [No Abnormal Lymph Nodes Palpated] : no abnormal lymph nodes palpated [No Clavicular Crepitus] : no clavicular crepitus [Negative López-Ortalani] : negative López-Ortalani [Symmetric Buttocks Creases] : symmetric buttocks creases [No Spinal Dimple] : no spinal dimple [NoTuft of Hair] : no tuft of hair [Cranial Nerves Grossly Intact] : cranial nerves grossly intact [No Rash or Lesions] : no rash or lesions

## 2023-08-24 NOTE — SUBJECTIVE & OBJECTIVE
Interval History: No acute overnight events. Patient's troponin down-trending. Patient continues to deny chest pain, palpitations, or shortness of breath. TTE performed this AM with EF unchanged at 40%. Will plan for medical management of suspected type II NSTEMI.    Review of Systems   Constitutional: Positive for decreased appetite and malaise/fatigue. Negative for chills and fever.   HENT: Negative.     Cardiovascular:  Negative for chest pain, leg swelling and palpitations.   Respiratory: Negative.  Negative for cough and shortness of breath.    Musculoskeletal: Negative.    Gastrointestinal:  Negative for abdominal pain, nausea and vomiting.   Genitourinary: Negative.    Neurological: Negative.    Psychiatric/Behavioral:  Negative for altered mental status. The patient is not nervous/anxious.      Objective:     Vital Signs (Most Recent):  Temp: 98.9 °F (37.2 °C) (08/24/23 0400)  Pulse: 98 (08/24/23 0600)  Resp: (!) 22 (08/24/23 0600)  BP: 134/72 (08/24/23 0600)  SpO2: 100 % (08/24/23 0600) Vital Signs (24h Range):  Temp:  [96.8 °F (36 °C)-98.9 °F (37.2 °C)] 98.9 °F (37.2 °C)  Pulse:  [] 98  Resp:  [15-38] 22  SpO2:  [97 %-100 %] 100 %  BP: ()/() 134/72     Weight: 61.1 kg (134 lb 11.2 oz)  Body mass index is 23.12 kg/m².     SpO2: 100 %         Intake/Output Summary (Last 24 hours) at 8/24/2023 0906  Last data filed at 8/24/2023 0645  Gross per 24 hour   Intake 870.6 ml   Output 653 ml   Net 217.6 ml       Lines/Drains/Airways       Drain  Duration                  Urethral Catheter 08/23/23 1338 16 Fr. <1 day              Peripheral Intravenous Line  Duration                  Peripheral IV - Single Lumen 08/19/23 1440 18 G;2 in Anterior;Right Upper Arm 4 days         Peripheral IV - Single Lumen 08/23/23 1704 20 G Anterior;Right Forearm <1 day                    Physical Exam  Vitals and nursing note reviewed.   Constitutional:       General: She is not in acute distress.     Appearance: She  is not toxic-appearing or diaphoretic.   HENT:      Head: Normocephalic and atraumatic.      Mouth/Throat:      Mouth: Mucous membranes are moist.      Pharynx: Oropharynx is clear.   Eyes:      Extraocular Movements: Extraocular movements intact.      Conjunctiva/sclera: Conjunctivae normal.   Cardiovascular:      Rate and Rhythm: Regular rhythm. Tachycardia present.      Heart sounds: No murmur heard.     No gallop.   Pulmonary:      Effort: Pulmonary effort is normal. No respiratory distress.      Breath sounds: No wheezing.   Abdominal:      General: There is no distension.      Palpations: Abdomen is soft.      Tenderness: There is generalized abdominal tenderness. There is no guarding.   Musculoskeletal:         General: No tenderness. Normal range of motion.      Cervical back: Normal range of motion and neck supple.      Right lower leg: No edema.      Left lower leg: No edema.   Skin:     General: Skin is warm and dry.   Neurological:      Mental Status: She is alert and oriented to person, place, and time. Mental status is at baseline.      Cranial Nerves: No dysarthria.   Psychiatric:         Mood and Affect: Mood normal.         Behavior: Behavior normal.         Significant Labs: CMP   Recent Labs   Lab 08/23/23  0104 08/23/23  0201 08/24/23  0418    138 140   K 4.2 3.8 4.0    106 105   CO2 19* 22* 23   * 134* 149*   BUN 10 12 16   CREATININE 0.8 0.9 1.0   CALCIUM 8.6* 8.5* 8.3*   PROT  --  6.6 7.0   ALBUMIN  --  1.5* 1.5*   BILITOT  --  7.6* 5.7*   ALKPHOS  --  743* 843*   AST  --  172* 351*   ALT  --  90* 120*   ANIONGAP 16 10 12    and Troponin   Recent Labs   Lab 08/23/23  0201 08/23/23  0410 08/23/23  1833   TROPONINI 6.090* 8.147* 4.729*

## 2023-08-25 PROBLEM — K76.9 LIVER LESION: Status: RESOLVED | Noted: 2023-08-02 | Resolved: 2023-08-25

## 2023-08-25 PROBLEM — D64.9 ANEMIA: Status: RESOLVED | Noted: 2023-08-20 | Resolved: 2023-08-25

## 2023-08-25 PROBLEM — R79.89 ELEVATED TROPONIN: Status: RESOLVED | Noted: 2019-06-06 | Resolved: 2023-08-25

## 2023-08-25 LAB
ALBUMIN SERPL BCP-MCNC: 1.3 G/DL (ref 3.5–5.2)
ALP SERPL-CCNC: 689 U/L (ref 55–135)
ALT SERPL W/O P-5'-P-CCNC: 86 U/L (ref 10–44)
ANION GAP SERPL CALC-SCNC: 9 MMOL/L (ref 8–16)
ANISOCYTOSIS BLD QL SMEAR: SLIGHT
APTT PPP: 45.3 SEC (ref 21–32)
AST SERPL-CCNC: 196 U/L (ref 10–40)
BASOPHILS # BLD AUTO: 0.02 K/UL (ref 0–0.2)
BASOPHILS NFR BLD: 0.1 % (ref 0–1.9)
BILIRUB SERPL-MCNC: 4.3 MG/DL (ref 0.1–1)
BUN SERPL-MCNC: 16 MG/DL (ref 8–23)
CALCIUM SERPL-MCNC: 8.2 MG/DL (ref 8.7–10.5)
CHLORIDE SERPL-SCNC: 105 MMOL/L (ref 95–110)
CO2 SERPL-SCNC: 22 MMOL/L (ref 23–29)
CREAT SERPL-MCNC: 1.1 MG/DL (ref 0.5–1.4)
DIFFERENTIAL METHOD: ABNORMAL
E COLI SXT1 STL QL IA: NEGATIVE
E COLI SXT2 STL QL IA: NEGATIVE
EOSINOPHIL # BLD AUTO: 0.2 K/UL (ref 0–0.5)
EOSINOPHIL NFR BLD: 1.3 % (ref 0–8)
ERYTHROCYTE [DISTWIDTH] IN BLOOD BY AUTOMATED COUNT: 18.1 % (ref 11.5–14.5)
EST. GFR  (NO RACE VARIABLE): 56.8 ML/MIN/1.73 M^2
GLUCOSE SERPL-MCNC: 145 MG/DL (ref 70–110)
HCT VFR BLD AUTO: 27.3 % (ref 37–48.5)
HGB BLD-MCNC: 8.6 G/DL (ref 12–16)
IMM GRANULOCYTES # BLD AUTO: 0.08 K/UL (ref 0–0.04)
IMM GRANULOCYTES NFR BLD AUTO: 0.6 % (ref 0–0.5)
INR PPP: 1.4 (ref 0.8–1.2)
LACTATE SERPL-SCNC: 1.4 MMOL/L (ref 0.5–2.2)
LYMPHOCYTES # BLD AUTO: 0.8 K/UL (ref 1–4.8)
LYMPHOCYTES NFR BLD: 5.9 % (ref 18–48)
MAGNESIUM SERPL-MCNC: 2.3 MG/DL (ref 1.6–2.6)
MCH RBC QN AUTO: 26 PG (ref 27–31)
MCHC RBC AUTO-ENTMCNC: 31.5 G/DL (ref 32–36)
MCV RBC AUTO: 83 FL (ref 82–98)
MONOCYTES # BLD AUTO: 0.5 K/UL (ref 0.3–1)
MONOCYTES NFR BLD: 3.6 % (ref 4–15)
NEUTROPHILS # BLD AUTO: 12.2 K/UL (ref 1.8–7.7)
NEUTROPHILS NFR BLD: 88.5 % (ref 38–73)
NRBC BLD-RTO: 0 /100 WBC
PLATELET # BLD AUTO: 241 K/UL (ref 150–450)
PLATELET BLD QL SMEAR: ABNORMAL
PMV BLD AUTO: 12.5 FL (ref 9.2–12.9)
POCT GLUCOSE: 110 MG/DL (ref 70–110)
POCT GLUCOSE: 112 MG/DL (ref 70–110)
POCT GLUCOSE: 147 MG/DL (ref 70–110)
POCT GLUCOSE: 165 MG/DL (ref 70–110)
POLYCHROMASIA BLD QL SMEAR: ABNORMAL
POTASSIUM SERPL-SCNC: 3.7 MMOL/L (ref 3.5–5.1)
PROT SERPL-MCNC: 6.5 G/DL (ref 6–8.4)
PROTHROMBIN TIME: 15.2 SEC (ref 9–12.5)
RBC # BLD AUTO: 3.31 M/UL (ref 4–5.4)
SODIUM SERPL-SCNC: 136 MMOL/L (ref 136–145)
VANCOMYCIN TROUGH SERPL-MCNC: 36 UG/ML (ref 10–22)
WBC # BLD AUTO: 13.83 K/UL (ref 3.9–12.7)

## 2023-08-25 PROCEDURE — 80053 COMPREHEN METABOLIC PANEL: CPT

## 2023-08-25 PROCEDURE — 83605 ASSAY OF LACTIC ACID: CPT | Performed by: STUDENT IN AN ORGANIZED HEALTH CARE EDUCATION/TRAINING PROGRAM

## 2023-08-25 PROCEDURE — 94761 N-INVAS EAR/PLS OXIMETRY MLT: CPT

## 2023-08-25 PROCEDURE — 63600175 PHARM REV CODE 636 W HCPCS: Performed by: INTERNAL MEDICINE

## 2023-08-25 PROCEDURE — 99233 SBSQ HOSP IP/OBS HIGH 50: CPT | Mod: ,,, | Performed by: STUDENT IN AN ORGANIZED HEALTH CARE EDUCATION/TRAINING PROGRAM

## 2023-08-25 PROCEDURE — 83735 ASSAY OF MAGNESIUM: CPT | Performed by: STUDENT IN AN ORGANIZED HEALTH CARE EDUCATION/TRAINING PROGRAM

## 2023-08-25 PROCEDURE — 25000003 PHARM REV CODE 250: Performed by: INTERNAL MEDICINE

## 2023-08-25 PROCEDURE — 63600175 PHARM REV CODE 636 W HCPCS: Performed by: EMERGENCY MEDICINE

## 2023-08-25 PROCEDURE — 94660 CPAP INITIATION&MGMT: CPT

## 2023-08-25 PROCEDURE — 36415 COLL VENOUS BLD VENIPUNCTURE: CPT | Performed by: STUDENT IN AN ORGANIZED HEALTH CARE EDUCATION/TRAINING PROGRAM

## 2023-08-25 PROCEDURE — 85730 THROMBOPLASTIN TIME PARTIAL: CPT | Performed by: INTERNAL MEDICINE

## 2023-08-25 PROCEDURE — 85610 PROTHROMBIN TIME: CPT | Performed by: STUDENT IN AN ORGANIZED HEALTH CARE EDUCATION/TRAINING PROGRAM

## 2023-08-25 PROCEDURE — 87040 BLOOD CULTURE FOR BACTERIA: CPT | Performed by: STUDENT IN AN ORGANIZED HEALTH CARE EDUCATION/TRAINING PROGRAM

## 2023-08-25 PROCEDURE — 25000003 PHARM REV CODE 250: Performed by: STUDENT IN AN ORGANIZED HEALTH CARE EDUCATION/TRAINING PROGRAM

## 2023-08-25 PROCEDURE — 99233 PR SUBSEQUENT HOSPITAL CARE,LEVL III: ICD-10-PCS | Mod: ,,, | Performed by: STUDENT IN AN ORGANIZED HEALTH CARE EDUCATION/TRAINING PROGRAM

## 2023-08-25 PROCEDURE — 51798 US URINE CAPACITY MEASURE: CPT

## 2023-08-25 PROCEDURE — 20600001 HC STEP DOWN PRIVATE ROOM

## 2023-08-25 PROCEDURE — 80202 ASSAY OF VANCOMYCIN: CPT | Performed by: INTERNAL MEDICINE

## 2023-08-25 PROCEDURE — 85025 COMPLETE CBC W/AUTO DIFF WBC: CPT | Performed by: STUDENT IN AN ORGANIZED HEALTH CARE EDUCATION/TRAINING PROGRAM

## 2023-08-25 PROCEDURE — 25500020 PHARM REV CODE 255: Performed by: STUDENT IN AN ORGANIZED HEALTH CARE EDUCATION/TRAINING PROGRAM

## 2023-08-25 PROCEDURE — 63600175 PHARM REV CODE 636 W HCPCS

## 2023-08-25 PROCEDURE — 63600175 PHARM REV CODE 636 W HCPCS: Performed by: STUDENT IN AN ORGANIZED HEALTH CARE EDUCATION/TRAINING PROGRAM

## 2023-08-25 RX ORDER — METRONIDAZOLE 500 MG/100ML
500 INJECTION, SOLUTION INTRAVENOUS
Status: DISCONTINUED | OUTPATIENT
Start: 2023-08-25 | End: 2023-09-01 | Stop reason: HOSPADM

## 2023-08-25 RX ORDER — CIPROFLOXACIN 2 MG/ML
400 INJECTION, SOLUTION INTRAVENOUS
Status: DISCONTINUED | OUTPATIENT
Start: 2023-08-25 | End: 2023-09-01 | Stop reason: HOSPADM

## 2023-08-25 RX ADMIN — OXYCODONE HYDROCHLORIDE 5 MG: 5 TABLET ORAL at 05:08

## 2023-08-25 RX ADMIN — PROMETHAZINE HYDROCHLORIDE 12.5 MG: 12.5 TABLET ORAL at 10:08

## 2023-08-25 RX ADMIN — VANCOMYCIN HYDROCHLORIDE 1000 MG: 1 INJECTION, POWDER, LYOPHILIZED, FOR SOLUTION INTRAVENOUS at 04:08

## 2023-08-25 RX ADMIN — METRONIDAZOLE 500 MG: 500 INJECTION, SOLUTION INTRAVENOUS at 12:08

## 2023-08-25 RX ADMIN — CIPROFLOXACIN 400 MG: 2 INJECTION, SOLUTION INTRAVENOUS at 11:08

## 2023-08-25 RX ADMIN — ENOXAPARIN SODIUM 40 MG: 40 INJECTION SUBCUTANEOUS at 05:08

## 2023-08-25 RX ADMIN — ESCITALOPRAM OXALATE 10 MG: 10 TABLET ORAL at 08:08

## 2023-08-25 RX ADMIN — ASPIRIN 81 MG: 81 TABLET, COATED ORAL at 08:08

## 2023-08-25 RX ADMIN — METRONIDAZOLE 500 MG: 500 INJECTION, SOLUTION INTRAVENOUS at 08:08

## 2023-08-25 RX ADMIN — CEFEPIME 2 G: 2 INJECTION, POWDER, FOR SOLUTION INTRAVENOUS at 01:08

## 2023-08-25 RX ADMIN — IOHEXOL 100 ML: 350 INJECTION, SOLUTION INTRAVENOUS at 03:08

## 2023-08-25 RX ADMIN — ONDANSETRON 4 MG: 2 INJECTION INTRAMUSCULAR; INTRAVENOUS at 05:08

## 2023-08-25 RX ADMIN — PROMETHAZINE HYDROCHLORIDE 12.5 MG: 12.5 TABLET ORAL at 08:08

## 2023-08-25 RX ADMIN — CIPROFLOXACIN 400 MG: 2 INJECTION, SOLUTION INTRAVENOUS at 10:08

## 2023-08-25 RX ADMIN — CLOPIDOGREL BISULFATE 75 MG: 75 TABLET ORAL at 08:08

## 2023-08-25 NOTE — NURSING TRANSFER
Nursing Transfer Note      8/24/2023   9:39 PM    Nurse giving handoff:Antoine Weathers RN  Nurse receiving handoff: Nisreen Rader RN  Reason patient is being transferred: Stepdown from ICU     Transfer To: 14WT - 05456 Transfer From: 6WT CMICU - 6072    Transfer via bed    Transfer with cardiac monitoring    Transported by Antoine Weathers RN    Telemetry: continuous cardiac monitoring via bedside monitor    Medicines sent: Insulin Aspart pen    Any special needs or follow-up needed:     Patient belongings transferred with patient: Yes    Chart send with patient: Yes    Notified: spouse (Shamir Huff)    Patient reassessed at: 8/24/2023 @2130    Upon arrival to floor: cardiac monitor applied, patient oriented to room, call bell in reach, and bed in lowest position, Charge nurse came to bedside

## 2023-08-25 NOTE — PLAN OF CARE
Pt Aox4 and follows commands. Slow to respond. Room air. Pt refused Bipap overnight. Sinus tach on tele. Pt reports nausea upon arriving to unit. PRN medication given. Fontenot pulled right before pt arrived to unit. Pt bladder scanned 117 cc of urine found in bladder. Pt denies pain/discomfort. Provider made aware. Orders received to scan again in a couple of hours. Bed in lowest position and call light within reach.

## 2023-08-25 NOTE — ASSESSMENT & PLAN NOTE
Patient reports development of acute abdominal pain associated with nausea and vomiting. Patient was recently admitted (6/23) for suspected biliary pancreatitis and biliary stricture treated with biliary sphincterotomy, biliary stent, and cholecystectomy s/p ERCP w/ metal stent on 8/17, abdominal pain has resolved but T bili is still rising     - Trend T bili daily  -AES consulted, s/p ERCP w/ biopsy on 8/17, transaminases and alk phos improving but bili is worsening  -S/p repeat ERCP 8/22 w/ septic shock from presume cholangitis, see septic shock above  - LFTs improving

## 2023-08-25 NOTE — ASSESSMENT & PLAN NOTE
Patient with Paroxysmal (<7 days) atrial fibrillation which is controlled currently with Beta Blocker. Patient is currently in sinus rhythm.[id number] Score: 3. . Anticoagulation not indicated due to possible bleeding, was previously on lovenox but it is being held.

## 2023-08-25 NOTE — PHYSICIAN QUERY
PT Name: Mariann [facility]  MR #: [id number]    DOCUMENTATION CLARIFICATION     CDS/: Tere Westfall RN               Contact information: [email or web address]  This form is a permanent document in the medical record.     Query Date: August 25, 2023    By submitting this query, we are merely seeking further clarification of documentation. Please utilize your independent clinical judgment when addressing the question(s) below.    The Medical Record contains the following:   Indicators   Supporting Clinical Findings Location in Medical Record   x AMS, Confusion,  LOC, etc.  Still with intermittent  AMS  Plan of Care 8/24     x Acute/Chronic Illness 63 y.o. female with CAD s/p GINGER to ostial LAD in 2014, HTN, HLD, DM2, MURTAZA who presented to the ED to the ER after a near syncopal event at home around 4 pm. She also feels tired and her appetite  lately has decreased. she had chest pain and visceral symptoms with nausea and vomiting.  Shock  STEMI  Elevated troponin  Pancreatic cancer  Biliary obstruction  Sleep apnea     Suspect type II NSTEMI from sepsis rather than plaque rupture or significant re-stenosis at this time. H&P 8/23                      Cards PN 8/24        Radiology Findings      Electrolyte Imbalance     x Medication Norepinephrine 16 mcg/ml IV   ml bolus IV x 4 doses  Cefepime 2 gr IV Q 12 hrs  Cefpodoxime 400 mg PO Q 12 hrs  Cipro 400 mg IV Q 12 hrs  Piperacillin 4.5 gr IV Q 8 hrs  Vancomycin 1,250 mg x 1 dose   MAR 8/23-24  MAR 8/23  MAR 8/23-25  MAR 8/14-8/17  MAR 8/25  MAR 8/17-8/18  MAR 8/23    Treatment             x Other She appears to be encephalopathic and need significant prompting to move or answer questions. I am currently concerned for septic encephalopathy versus  pulmonary embolism. Empiric antibiotics were initiated with cefepime and vancomycin and metronidazole was added later for anaerobic coverage.    Called to bedside for worsening hypoxia and increasing tachycardia. On  my assessment the patient was hypoxic on bipap 30% 10/5.    Overnight she became septic appearing with temp 103F, hypoxia, and tachycardia. She was transferred to MICU overnight for closer monitoring and pressors due to hypotension not responsive to IVF bolus.   HM Significant Event 8/23          HM Significant Event 8/23      GI PN 8/23     The noted clinical guidelines are only system guidelines and do not replace the providers clinical judgment.    The National [place] of Neurologic Disorders and Stroke (NINDS) of the NIH describes encephalopathy as any diffuse disease of the brain that alters brain function or structure.    Provider, please specify the diagnosis or diagnoses associated with above clinical findings.    [  x ] Metabolic Encephalopathy - Due to electrolyte imbalance, metabolic derangements, or infectious processes, includes Septic Encephalopathy, Uremic Encephalopathy     [   ] Anoxic/Hypoxic Encephalopathy- Permanent brain damage due to anoxia/hypoxia     [   ] Other Encephalopathy (please specify): ____________________         Please document in your progress notes daily for the duration of treatment until resolved, and include in your discharge summary.    References:  DEEDEE Steele RN, CCDS. (2018, June 9). Notes from the Instructor: Encephalopathy tips. Retrieved October 22, 2020, from [email or web address]://acdis.org/articles/[email or web address]-instructor-encephalopathy-tips    ICD-9-CM Coding Clinic First Quarter 2013, Effective with discharges: October 21, 2013 [facility] § Seizure with encephalopathy due to postictal state (2013).    ICD-10-CM/PCS [employer] Integrated Codebook (Version V.20.8.10.0) [Computer software]. (2020). Retrieved October 21, 2020.    National [place] of Neurological Disorders and Stroke. (2019, March 27). Retrieved October 22, 2020, from https://www.ninds.nih.gov/Disorders/All-Disorders/Batcrodxucrlwg-Bvwfussvess-Expz    Form No. 82511

## 2023-08-25 NOTE — PROGRESS NOTES
[facility] - [facility] ([address] Medicine  Progress Note    Patient Name: Mariann Huff  MRN: [id number]  Patient Class: IP- Inpatient   Admission Date: 8/14/2023  Length of Stay: 11 days  Attending Physician: Baudilio Lozada MD  Primary Care Provider: Jasbir Haney MD        Subjective:     Principal Problem:Shock        HPI:  63 y.o. female with CAD s/p GINGER to ostial LAD in 2014, HTN, HLD, DM2, MURTAZA who presented to the ED to the ER after a near syncopal event at home around 4 pm. She also feels tired and her appetite lately has decreased. she had chest pain and visceral symptoms with nausea and vomiting. She reports losing weight as well.She has had abdominal symptoms with poor PO intake leading to orthostasis. Today she had another near syncopal event and is why she was brought to the ER. Here her ECG showed the posterior changes for which posterior EKG was obtained with showed STEMI. Bedside echo showed an EF 45-50% and norma-lateral and infero-lateral hypokinesis. She was taken to cath lab and her symptoms resolved after PCI. Please refer to PCI for full details.       Overview/Hospital Course:  Patient was taken to cath lab and her symptoms resolved after PCI. Patient chest pain resolved.     Patient reported development of acute abdominal pain associated with nausea and vomiting. Patient was recently admitted (6/23) for suspected biliary pancreatitis and biliary stricture treated with biliary sphincterotomy, biliary stent, and cholecystectomy.    Patient now has worsening transaminitis w/ elevated T-Bili.   Concern for stent occlusion. Patient amylase 107 wnl, and Lipase 61. Workup will include total abdominal ultrasound and [facility] was consulted . Was scheduled for outpatient w/u of hepatic lesions found on previous admission 8/2/23.  Patient had ERCP and EUS on 8/17/23. Visualized a partial occluded stent in the biliary tree. One covered metal stent was placed into  the common bile duct. Concern for metastatic cancer is high due to multiple liver lesions and mass on pancreatic head per [facility]. Initially on previous hospital admission, it was believed to more likely be liver abscesses rather than malignancy due to acute nature of progression. S/p FNA biopsy with ERCP and stent placement on 8/17.     Became hypoglycemic on 8/19 and Hgb dropped from 8 to 6.3 with a reported dark stool, Improved to 7.1 after 1 unit PRBC, could not get a second unit due to respiratory distress, no further episodes of melena. Seen by GI, did not recommend endoscopy at this time for possible GIB.  Given lasix 20mg IV then 40mg IV with good output and improvement in respiratory status.    T bili continued to rise on 8/21, KUB did not show stent migration, patient underwent ERCP again on 8/22 which demonstrated a visibly ocluded stent in the biliary tree with a single biliary stricture in main BD. One covered metal stent placed in CBD. Several hours following this procedure, patient developed rapid onset fever to 103 and rapid drop in BP from SBP 180s to SBP 80s. Patient admitted to MICU for further management. She was continued on vanc/cefepime, briefly on levophed and then weaned off. Still with intermittent AMS and elevateed WBC, febrile 8/25. Adjusted abx to vanc/cipro/flagyl.       Interval History: NAEON, transferred to     Review of Systems   Constitutional:  Positive for fatigue.   Respiratory:  Negative for shortness of breath.    Cardiovascular:  Negative for chest pain.   Gastrointestinal:  Negative for abdominal pain.   Genitourinary:  Negative for dysuria and frequency.   Neurological:  Negative for dizziness and headaches.     Objective:     Vital Signs (Most Recent):  Temp: 100.3 °F (37.9 °C) (Informed GOPAL Bates) (08/25/23 1140)  Pulse: 101 (08/25/23 1140)  Resp: (!) 25 (08/25/23 1140)  BP: (!) 113/56 (08/25/23 1140)  SpO2: 100 % (08/25/23 1152) Vital Signs (24h Range):  Temp:  [98.6 °F  (37 °C)-100.3 °F (37.9 °C)] 100.3 °F (37.9 °C)  Pulse:  [] 101  Resp:  [4-25] 25  SpO2:  [98 %-100 %] 100 %  BP: (100-154)/(54-89) 113/56     Weight: 60.8 kg (134 lb 0.6 oz)  Body mass index is 23.01 kg/m².    Intake/Output Summary (Last 24 hours) at 8/25/2023 1458  Last data filed at 8/25/2023 0830  Gross per 24 hour   Intake 0 ml   Output 230 ml   Net -230 ml           Physical Exam  Constitutional:       Appearance: She is ill-appearing.   HENT:      Head: Normocephalic and atraumatic.   Cardiovascular:      Rate and Rhythm: Normal rate and regular rhythm.      Pulses: Normal pulses.      Heart sounds: Normal heart sounds.   Pulmonary:      Effort: Pulmonary effort is normal.      Comments: Crackles in all lung fields  Abdominal:      General: Abdomen is flat.      Tenderness: There is no abdominal tenderness.   Musculoskeletal:      Right lower leg: No edema.      Left lower leg: No edema.   Skin:     General: Skin is warm.      Capillary Refill: Capillary refill takes less than 2 seconds.      Coloration: Skin is not jaundiced or pale.   Neurological:      General: No focal deficit present.      Mental Status: She is alert and oriented to person, place, and time.      Comments: Not oriented to situation, very forgetful   Psychiatric:         Mood and Affect: Mood normal.             Significant Labs: All pertinent labs within the past 24 hours have been reviewed.  BMP:   Recent Labs   Lab 08/25/23  0659   *      K 3.7      CO2 22*   BUN 16   CREATININE 1.1   CALCIUM 8.2*   MG 2.3       CBC:   Recent Labs   Lab 08/24/23  0418 08/25/23  0659   WBC 13.50* 13.83*   HGB 9.6* 8.6*   HCT 30.7* 27.3*    241       CMP:   Recent Labs   Lab 08/24/23  0418 08/25/23  0659    136   K 4.0 3.7    105   CO2 23 22*   * 145*   BUN 16 16   CREATININE 1.0 1.1   CALCIUM 8.3* 8.2*   PROT 7.0 6.5   ALBUMIN 1.5* 1.3*   BILITOT 5.7* 4.3*   ALKPHOS 843* 689*   * 196*   *  "86*   ANIONGAP 12 9       Lactic Acid: No results for input(s): "LACTATE" in the last 48 hours.  Troponin:   Recent Labs   Lab 08/23/23  1833   TROPONINI 4.729*     Urine Culture: No results for input(s): "LABURIN" in the last 48 hours.  Urine Studies: No results for input(s): "COLORU", "APPEARANCEUA", "PHUR", "SPECGRAV", "PROTEINUA", "GLUCUA", "KETONESU", "BILIRUBINUA", "OCCULTUA", "NITRITE", "UROBILINOGEN", "LEUKOCYTESUR", "RBCUA", "WBCUA", "BACTERIA", "SQUAMEPITHEL", "HYALINECASTS" in the last 48 hours.    Invalid input(s): "WRIGHTSUR"    Significant Imaging: I have reviewed all pertinent imaging results/findings within the past 24 hours.      Assessment/Plan:      * Shock  Presumed Cholangitis  Developed septic shock on 8/22 after ERCP, some notes indicate pus seen during stent exchange, she then became hypotensive and required MICU admission for levophed. She had a troponin elevation, thought to be demand ischemia from hypotension in setting of recent STEMI. She was on vanc/cefepime and transferred to  on 8/25, she was noted to have new spastic movements, cefipime changed to cipro/flagyl due to high vanc trough to avoid nephrotoxicity with zosyn.   - Vanc/cefepime/flagyl - plan to continue until at least 8/30 per GI recs  - Follow up bcx, NGTD, last done 8/25 for fever  - Continued elevated WBC and fatigue  - If no improvement may consult ID, unfortunately no culture data was sent from ERCP  - If hypotensive again would be very cautious with fluids due to recent STEMI and HFrEF      Pancreatic cancer  Diagnosed this admission, will need follow up with oncology.       Atrial fibrillation  Patient with Paroxysmal (<7 days) atrial fibrillation which is controlled currently with Beta Blocker. Patient is currently in sinus rhythm.[id number] Score: 3. . Anticoagulation not indicated due to possible bleeding, was previously on lovenox but it is being held.    Biliary obstruction  Patient reports development of acute " abdominal pain associated with nausea and vomiting. Patient was recently admitted (6/23) for suspected biliary pancreatitis and biliary stricture treated with biliary sphincterotomy, biliary stent, and cholecystectomy s/p ERCP w/ metal stent on 8/17, abdominal pain has resolved but T bili is still rising     - Trend T bili daily  -[facility] consulted, s/p ERCP w/ biopsy on 8/17, transaminases and alk phos improving but bili is worsening  -S/p repeat ERCP 8/22 w/ septic shock from presume cholangitis, see septic shock above  - LFTs improving       CHF (congestive heart failure)  -On week prior to admission had CXR with B/L edema, , EF decreased to 40-45%  -Got IV lasix while inpatient and was discharged on 40 po daily however became dehydrated and weak and LVEDP -in the lab showed LVEDP 9, reduce dose to 20 po  - Holding all GDMT in setting of recent shock, will resume once infection is better controlled  - Holding lasix for now    TTE 8/15/23    Left Ventricle: The left ventricle is normal in size. Ventricular mass is normal. Normal wall thickness. regional wall motion abnormalities present. See diagram for wall motion findings. There is moderately reduced systolic function. Ejection fraction by visual approximation is 40%. Grade II diastolic dysfunction.    Left Atrium: Left atrium is mildly dilated. The left atrium volume index is 35.5 mL/m2.    Right Ventricle: Normal right ventricular cavity size. Wall thickness is normal. Right ventricle wall motion  is normal. Systolic function is normal.    Mitral Valve: There is mild regurgitation.    IVC/SVC: Normal venous pressure at 3 mmHg.    Liver parenchyma is inhomgenous.  Clinical correlation is required.      STEMI (ST elevation myocardial infarction)  -Started having near syncope around 4 pm on the day of admission  -Bedside echo in the ER showed an EF 45-50% and norma-lateral and infero-lateral hypokinesis  -She underwent Successful primary PCI of ramus  intermedius and lateral branch with Pronto thrombectomy and 3X16 GINGER     Plan  - Continue aspirin 81 mg daily, stop after 1 month to avoid triple therapy  - Loaded with brilinta 180, loaded with plavix 600 8/15/23, and 75 qd there after  - Continue high intensity statin therapy (LDL goal < 70)  - TTE shows ejection fraction of about 40% associated with grade II diastolic dysfunction.  - Hold lasix for now  - Continue ASA/plavix  - Developed troponin elevation in setting of septic shock as well, seen by cardiology, TTE unchanged, no need for repeat LHC      Other specified anemias  Had notable hemoglobin drop, c/f GIB, however stools turned brown by time of GI evaluation, held lovenox for afib, still holding lovenox, may resume if hgb continues to be stable. Received 2 units PRBC this admission  - Daily CBC  - Hgb goal >8 due to recent STEMI  - Hold lovenox for now      NSTEMI (non-ST elevated myocardial infarction)  During septic shock, see STEMI above for further discussion      History of pancreatitis  Recent (6/2023) suspected biliary pancreatitis and biliary stricture treated with biliary sphincterotomy, biliary stent, and cholecystectomy.  CT A/P with new innumerable hepatic lesions:  1. Innumerable low attenuating hepatic lesions, new since the previous exam.  Malignancy remains a concern however given short-term development, correlation is needed to exclude multifocal infection/abscess in this patient status post bile duct stent placement and cholecystectomy.  Please note, there is a low attenuating focus within the gallbladder fossa, similar to the foci throughout the hepatic parenchyma..  2. Pancreatic ductal dilatation up to 4 mm, minimally increased compared to prior.  There is fullness of the pancreatic head, underlying mass is not excluded, correlation with recent ERCP advised.  3. Simple and complex bilateral renal cysts.  4. Biliary stent in place with expected pneumobilia.  5. Cholecystectomy with  scattered fluid about the gallbladder fossa.  6. Moderate stool throughout the colon, may reflect developing constipation.  7.  Additional findings as above.     - concern for lesions seen on CT A/P, underwent biopsy, now confirm pancreatic CA  - Patient was found to have a hypoechoic pancreatic mass on abdominal US  - Was also found to have this mass on EUS concerning for malignancy   - EUS and ERCP 8/17/23 and then again on 8/22    Type 2 diabetes mellitus with stage 2 chronic kidney disease and hypertension  Home Antihyperglycemic Regiment:  -Farxiga, glipizide, insulin daily at home  - Titrate and addition of insulin as needed for BG goal 140-180  - SSI with POCT accuchecks ACHS and Diabetic diet 2000 beti  - Diabetic nutritional counseling given   - Continue home gabapentin for diabetic peripheral neuropathy  - Appreciate Endocrinology recs  - Adjust insulin and monitor closely  - Endocrine following      VTE Risk Mitigation (From admission, onward)         Ordered     enoxaparin injection 40 mg  Every 24 hours         08/23/23 1026                Discharge Planning   REBECCA: 8/28/2023     Code Status: Full Code   Is the patient medically ready for discharge?: No    Reason for patient still in hospital (select all that apply): Patient trending condition and Treatment  Discharge Plan A: Home Health   Discharge Delays: None known at this time              Baudilio Lozada MD  Department of Hospital Medicine   [facility] - Intensive Care ([place]-)

## 2023-08-25 NOTE — SUBJECTIVE & OBJECTIVE
Interval History: MINDY, transferred to     Review of Systems   Constitutional:  Positive for fatigue.   Respiratory:  Negative for shortness of breath.    Cardiovascular:  Negative for chest pain.   Gastrointestinal:  Negative for abdominal pain.   Genitourinary:  Negative for dysuria and frequency.   Neurological:  Negative for dizziness and headaches.     Objective:     Vital Signs (Most Recent):  Temp: 100.3 °F (37.9 °C) (Informed GOPAL Bates) (08/25/23 1140)  Pulse: 101 (08/25/23 1140)  Resp: (!) 25 (08/25/23 1140)  BP: (!) 113/56 (08/25/23 1140)  SpO2: 100 % (08/25/23 1152) Vital Signs (24h Range):  Temp:  [98.6 °F (37 °C)-100.3 °F (37.9 °C)] 100.3 °F (37.9 °C)  Pulse:  [] 101  Resp:  [4-25] 25  SpO2:  [98 %-100 %] 100 %  BP: (100-154)/(54-89) 113/56     Weight: 60.8 kg (134 lb 0.6 oz)  Body mass index is 23.01 kg/m².    Intake/Output Summary (Last 24 hours) at 8/25/2023 1458  Last data filed at 8/25/2023 0830  Gross per 24 hour   Intake 0 ml   Output 230 ml   Net -230 ml           Physical Exam  Constitutional:       Appearance: She is ill-appearing.   HENT:      Head: Normocephalic and atraumatic.   Cardiovascular:      Rate and Rhythm: Normal rate and regular rhythm.      Pulses: Normal pulses.      Heart sounds: Normal heart sounds.   Pulmonary:      Effort: Pulmonary effort is normal.      Comments: Crackles in all lung fields  Abdominal:      General: Abdomen is flat.      Tenderness: There is no abdominal tenderness.   Musculoskeletal:      Right lower leg: No edema.      Left lower leg: No edema.   Skin:     General: Skin is warm.      Capillary Refill: Capillary refill takes less than 2 seconds.      Coloration: Skin is not jaundiced or pale.   Neurological:      General: No focal deficit present.      Mental Status: She is alert and oriented to person, place, and time.      Comments: Not oriented to situation, very forgetful   Psychiatric:         Mood and Affect: Mood normal.          "    Significant Labs: All pertinent labs within the past 24 hours have been reviewed.  BMP:   Recent Labs   Lab 08/25/23  0659   *      K 3.7      CO2 22*   BUN 16   CREATININE 1.1   CALCIUM 8.2*   MG 2.3       CBC:   Recent Labs   Lab 08/24/23  0418 08/25/23  0659   WBC 13.50* 13.83*   HGB 9.6* 8.6*   HCT 30.7* 27.3*    241       CMP:   Recent Labs   Lab 08/24/23  0418 08/25/23  0659    136   K 4.0 3.7    105   CO2 23 22*   * 145*   BUN 16 16   CREATININE 1.0 1.1   CALCIUM 8.3* 8.2*   PROT 7.0 6.5   ALBUMIN 1.5* 1.3*   BILITOT 5.7* 4.3*   ALKPHOS 843* 689*   * 196*   * 86*   ANIONGAP 12 9       Lactic Acid: No results for input(s): "LACTATE" in the last 48 hours.  Troponin:   Recent Labs   Lab 08/23/23  1833   TROPONINI 4.729*     Urine Culture: No results for input(s): "LABURIN" in the last 48 hours.  Urine Studies: No results for input(s): "COLORU", "APPEARANCEUA", "PHUR", "SPECGRAV", "PROTEINUA", "GLUCUA", "KETONESU", "BILIRUBINUA", "OCCULTUA", "NITRITE", "UROBILINOGEN", "LEUKOCYTESUR", "RBCUA", "WBCUA", "BACTERIA", "SQUAMEPITHEL", "HYALINECASTS" in the last 48 hours.    Invalid input(s): "WRIGHTSUR"    Significant Imaging: I have reviewed all pertinent imaging results/findings within the past 24 hours.  "

## 2023-08-25 NOTE — ASSESSMENT & PLAN NOTE
Had notable hemoglobin drop, c/f GIB, however stools turned brown by time of GI evaluation, held lovenox for afib, still holding lovenox, may resume if hgb continues to be stable. Received 2 units PRBC this admission  - Daily CBC  - Hgb goal >8 due to recent STEMI  - Hold lovenox for now

## 2023-08-25 NOTE — PHYSICIAN QUERY
PT Name: Mariann Huff  MR #: [id number]     DOCUMENTATION CLARIFICATION     CDS/: Tere Westfall RN              Contact information: [email or web address]  This form is a permanent document in the medical record.     Query Date: August 25, 2023    By submitting this query, we are merely seeking further clarification of documentation.  Please utilize your independent clinical judgment when addressing the question(s) below.  The Medical Record contains the following:  Indicators Supporting Clinical Findings Location in Medical Record   x Acute Illness (e.g. AMI, Sepsis, etc.) Mariann Huff is a 63 y.o. female initiated on antimicrobial therapy with IV Vancomycin for treatment of suspected sepsis    Axillary temperature was 103F and sepsis protocol was initiated  She appears to be encephalopathic and need significant prompting to move or answer questions. I am currently concerned for septic encephalopathy versus  pulmonary embolism. Empiric antibiotics were initiated with cefepime and vancomycin and metronidazole was added later for anaerobic coverage. EKG showed sinus tachycardia and patient denies any abdominal or chest pain. She is non-tender to palpation and  feels warm in the peripheries.    Critical secondary to Patient has a condition that poses threat to life and bodily function: Shock, concern for sepsis  # septic shock: briefly requiring pressors, requirements improved with fluid resuscitation.    Overnight she became septic appearing with temp 103F, hypoxia, and tachycardia.    Suspect type II NSTEMI from sepsis rather than plaque rupture or significant re-stenosis at this time.    Pharm PN 8/22      HM Significant Event 8/23                  CCM H&P 8/23          GI PN 8/23      Cards PN 8/24   x Vital Signs  Vital Signs (24h Range):  Temp:  [97.8 °F (36.6 °C)-99.1 °F (37.3 °C)] 98.2 °F (36.8 °C)  Pulse:  [81-94] 88  Resp:  [17-19] 18  SpO2:  [92 %-100 %] 93 %  BP: (128-176)/(56-85)  128/56    Vital Signs (24h Range):  Temp:  [96.8 °F (36 °C)-103.1 °F (39.5 °C)] 96.8 °F (36 °C)  Pulse:  [] 71  Resp:  [15-29] 15  SpO2:  [94 %-100 %] 99 %  BP: ()/() 106/44    Vital Signs (24h Range):  Temp:  [96.8 °F (36 °C)-98.9 °F (37.2 °C)] 98.9 °F (37.2 °C)  Pulse:  [] 98  Resp:  [15-38] 22  SpO2:  [97 %-100 %] 100 %  BP: ()/() 134/72   HM PN 8/22              GI PN 8/23              Cards PN 8/24                Acidosis documented     x ABGs/Labs  08/22/23 23:41   POC PH 7.453 (H)   POC PCO2 34.9 (L)   POC PO2 95   POC HCO3 24.5   Sodium, Blood Gas 141   Potassium, Blood Gas 3.9   POC SATURATED O2 98   Sample ARTERIAL  ARTERIAL        08/22/23 05:13 08/23/23 02:01 08/23/23 04:10 08/24/23 04:18 08/25/23 06:59   WBC 10.52 11.78 11.88 13.50 (H) 13.83 (H)      08/23/23 02:01   Lactate, [place] 2.1    ABGs 8/22                                    Labs 8/22-8/25                     x Hypotension or Low Blood Pressure documented She was transferred to MICU overnight for closer monitoring and pressors due to hypotension not responsive to IVF bolus. GI PN 8/23       x Altered Mental Status or Confusion Still with intermittent  AMS  Plan of Care 8/24    Diaphoresis, Cold Extremities or Cyanosis      Oliguria     x Medication/Treatment  -Vasopressors  -Inotropic Drugs  -IV Fluids   -IV Antibiotics  -Cardiac Assist Devices  -Hemodynamic Monitoring  -Blood/Blood Products Norepinephrine 16 mcg/ml IV   ml bolus IV x 4 doses  Cefepime 2 gr IV Q 12 hrs  Cefpodoxime 400 mg PO Q 12 hrs  Cipro 400 mg IV Q 12 hrs  Piperacillin 4.5 gr IV Q 8 hrs  Vancomycin 1,250 mg x 1 dose MAR 8/23-24  MAR 8/23  MAR 8/23-25  MAR 8/14-8/17  MAR 8/25  MAR 8/17-8/18  MAR 8/23   x Other DDx includes septic shock s/t biliary obstruction, cardiogenic shock, hypovolemia    Liver lesions with possible pancreatic mass  -s/p biliary pancreatitis s/p sphincterotomy, biliary stent, cholecystectomy in 06/2023  -Imaging  revealed liver lesions concerning for abscess  -was seen by ID last admit on 8/7/23 and started on cefpodoxime 400 po bid by Dr Archibald, continue the same  -would need liver lesions biopsy when feasible to r/o malignancy  -will need hepatology and ID f/up post discharge, refer to their notes from last week    - She was escalated to Zosyn due to Leukocytosis, progressively worsening abdominal pain, and tachycardia meeting SIRS criteria. Plan to de escalate pending ID input   [facility] H&P 8/23      H&P 8/15                      Cards PN 8/17     Provider, please specify diagnosis or diagnoses associated with above clinical findings.    Michael all that apply.    [  x  ] Septic Shock     [    ] Cardiogenic Shock     [    ] Hypovolemic Shock     [    ] Other Shock (please specify): __________     [    ] Other Condition (please specify): _________                 Please document in your progress notes daily for the duration of treatment until resolved and include in your discharge summary.     Form No. [id number]

## 2023-08-25 NOTE — PROGRESS NOTES
Pharmacokinetic Assessment Follow Up: IV Vancomycin    Vancomycin serum concentration assessment(s):    The trough level was drawn incorrectly and cannot be used to guide therapy at this time. It was drawn as a peak rather than a trough. This peak is below 40 mcg/mL, so within target peak range. However, scr continues to trend up from an admission baseline of 0.7 mg/dL to 1.1 mg/dL.     Vancomycin Regimen Plan:    Will discontinue scheduled vancomycin and pulse dose for now, with next 24hr random level at 0400 on 8/26.    Drug levels (last 3 results):  Recent Labs   Lab Result Units 08/25/23  0659   Vancomycin-Trough ug/mL 36.0*       Pharmacy will continue to follow and monitor vancomycin.    Please contact pharmacy at extension [phone] for questions regarding this assessment.    Thank you for the consult,   Benjy Salas       Patient brief summary:  Mariann Huff is a 63 y.o. female initiated on antimicrobial therapy with IV Vancomycin for treatment of sepsis    Drug Allergies:   Review of patient's allergies indicates:   Allergen Reactions    Milk containing products (dairy)      Causes GI distress       Actual Body Weight:   60.8 kg    Renal Function:   Estimated Creatinine Clearance: 45.8 mL/min (based on SCr of 1.1 mg/dL).,     Dialysis Method (if applicable):  N/A

## 2023-08-25 NOTE — ASSESSMENT & PLAN NOTE
-On week prior to admission had CXR with B/L edema, , EF decreased to 40-45%  -Got IV lasix while inpatient and was discharged on 40 po daily however became dehydrated and weak and LVEDP -in the lab showed LVEDP 9, reduce dose to 20 po  - Holding all GDMT in setting of recent shock, will resume once infection is better controlled  - Holding lasix for now    TTE 8/15/23    Left Ventricle: The left ventricle is normal in size. Ventricular mass is normal. Normal wall thickness. regional wall motion abnormalities present. See diagram for wall motion findings. There is moderately reduced systolic function. Ejection fraction by visual approximation is 40%. Grade II diastolic dysfunction.    Left Atrium: Left atrium is mildly dilated. The left atrium volume index is 35.5 mL/m2.    Right Ventricle: Normal right ventricular cavity size. Wall thickness is normal. Right ventricle wall motion  is normal. Systolic function is normal.    Mitral Valve: There is mild regurgitation.    IVC/SVC: Normal venous pressure at 3 mmHg.    Liver parenchyma is inhomgenous.  Clinical correlation is required.

## 2023-08-25 NOTE — ASSESSMENT & PLAN NOTE
-Started having near syncope around 4 pm on the day of admission  -Bedside echo in the ER showed an EF 45-50% and norma-lateral and infero-lateral hypokinesis  -She underwent Successful primary PCI of ramus intermedius and lateral branch with Pronto thrombectomy and 3X16 GINGER     Plan  - Continue aspirin 81 mg daily, stop after 1 month to avoid triple therapy  - Loaded with brilinta 180, loaded with plavix 600 8/15/23, and 75 qd there after  - Continue high intensity statin therapy (LDL goal < 70)  - TTE shows ejection fraction of about 40% associated with grade II diastolic dysfunction.  - Hold lasix for now  - Continue ASA/plavix  - Developed troponin elevation in setting of septic shock as well, seen by cardiology, TTE unchanged, no need for repeat LHC

## 2023-08-25 NOTE — ASSESSMENT & PLAN NOTE
Recent (6/2023) suspected biliary pancreatitis and biliary stricture treated with biliary sphincterotomy, biliary stent, and cholecystectomy.  CT A/P with new innumerable hepatic lesions:  1. Innumerable low attenuating hepatic lesions, new since the previous exam.  Malignancy remains a concern however given short-term development, correlation is needed to exclude multifocal infection/abscess in this patient status post bile duct stent placement and cholecystectomy.  Please note, there is a low attenuating focus within the gallbladder fossa, similar to the foci throughout the hepatic parenchyma..  2. Pancreatic ductal dilatation up to 4 mm, minimally increased compared to prior.  There is fullness of the pancreatic head, underlying mass is not excluded, correlation with recent ERCP advised.  3. Simple and complex bilateral renal cysts.  4. Biliary stent in place with expected pneumobilia.  5. Cholecystectomy with scattered fluid about the gallbladder fossa.  6. Moderate stool throughout the colon, may reflect developing constipation.  7.  Additional findings as above.     - concern for lesions seen on CT A/P, underwent biopsy, now confirm pancreatic CA  - Patient was found to have a hypoechoic pancreatic mass on abdominal US  - Was also found to have this mass on EUS concerning for malignancy   - EUS and ERCP 8/17/23 and then again on 8/22

## 2023-08-25 NOTE — CARE UPDATE
BG goal 140-180    -A1C   Lab Results   Component Value Date    HGBA1C 8.8 (H) 07/10/2023       -HOME REGIMEN: Glipizide ER 10mg before breakfast. Levemir 2u daily on a sliding scale     -INPATIENT REGIMEN: Novolog Correction Scale     -GLUCOSE TREND FOR THE PAST 24HRS: 110-204    -NO HYPOGYCEMIAS NOTED     -TOLERATING <25% OF PO DIET     -Diet: Diet diabetic Low Sodium,2gm; 2000 Calorie; Fluid - 2000mL      -Steroids -  N/A     -Tube Feeds - N/A       Remains in CSU. NAEON. BG  well controlled today; N/V today so did not get prandial insulin.       Plan:  - Novolog 3 units TID with meals (0.3 u/kg dosing)   -Continue Moderate Dose Correction Scale  -BG monitoring ac/hs    Discharge planning: TBD    Endocrine to continue to follow    ** Please call Endocrine for any BG related issues **

## 2023-08-25 NOTE — TREATMENT PLAN
AES Treatment Plan    Mariann Huff is a 63 y.o. female admitted to hospital 8/14/2023 (Hospital Day: 12) due to Shock.     Interval History  Patient stepdown from ICU to hospital medicine overnight. This morning at bedside, she denies abdominal pain, nausea, and vomiting. Labs notable for improving hyperbilirubinemia (4.3 from 7.6 on 08/23) and transaminitis (, ALT 86). Discussed biopsy results from EUS on 08/17 positive for adenocarcinoma.     Objective  Temp:  [98.6 °F (37 °C)-99.9 °F (37.7 °C)] 98.8 °F (37.1 °C) (08/25 0745)  Pulse:  [] 96 (08/25 0745)  BP: (100-166)/(54-89) 131/64 (08/25 0745)  Resp:  [4-25] 22 (08/25 0745)  SpO2:  [97 %-100 %] 99 % (08/25 0745)    Laboratory  Lab Results   Component Value Date    WBC 13.83 (H) 08/25/2023    HGB 8.6 (L) 08/25/2023    HCT 27.3 (L) 08/25/2023    MCV 83 08/25/2023     08/25/2023       Lab Results   Component Value Date    ALT 86 (H) 08/25/2023     (H) 08/25/2023    ALKPHOS 689 (H) 08/25/2023    BILITOT 4.3 (H) 08/25/2023       Assessment  This is a 63 year old female with a PMH significant for CAD (status post PCI in 2014), DVT (on Apixaban), MURTAZA, pancreatitis (diagnosed 05/2023; status post EUS/ERCP in 06/2023 showing gallbladder sludge and stricture in lower third of main duct suspected to be from pancreatitis with plastic stent placed into CBD), peripheral artery disease (on PLETAL), and T2DM and a PSH significant for subtotal cholecystectomy (late 06/2023) who was admitted to [facility] on 08/14 for posterior STEMI requiring PCI on admission and initiation of ASA and PLAVIX. Hospital course associated with onset of abdominal pain and progressive hyperbilirubinemia since 08/16 with CT A/P non-contrast from that time concerning for pancreatic head mass with multiple hepatic lesions concerning for underlying pancreatic cancer. EUS/ERCP completed on 08/17 showing pancreatic head mass, multiple hepatic lesions concerning for metastases,  and occluded prior biliary stent; biopsy obtained of hepatic lesion positive for adenocarcinoma and stent exchanged for covered metal stent. Post-procedure, patient developed progressive hyperbilirubinemia; ERCP repeated on 08/22 showing occlusion of metal stent with purulent output noted and stricture of the middle third of the main bile duct; additional covered metal stent placed into the CBD. Hospital course then associated with development of respiratory failure and shock requiring admission to MICU from 08/23 to 08/24; infectious work-up unremarkable, but suspected to be secondary to cholangitis based on purulent output noted during ERCP on 08/22. Hyperbilirubinemia now improving.     Recommendations    -Favor treating with gram-negative coverage for cholangitis for 5 additional days.  -Referral to oncology given EUS findings consistent with metastatic pancreatic cancer and biopsy positive for adenocarcinoma.   -CMP daily.   - We are signing-off. Please call with any questions.    Thank you for involving us in the care of Mariann Huff. Please call with any additional questions, concerns or changes in the patient's clinical status.        Vijay Bonilla MD, PGY-VI  Gastroenterology Fellow  [facility]

## 2023-08-25 NOTE — NURSING
Nurses Note -- 4 Eyes      8/25/2023   6:30 AM      Skin assessed during: Transfer      [x] No Altered Skin Integrity Present    [x]Prevention Measures Documented      [] Yes- Altered Skin Integrity Present or Discovered   [] LDA Added if Not in Epic (Describe Wound)   [] New Altered Skin Integrity was Present on Admit and Documented in LDA   [] Wound Image Taken    Wound Care Consulted? No    Attending Nurse:  Nisreen Talbot RN/Staff Member:   GOPAL Mckeon

## 2023-08-25 NOTE — ASSESSMENT & PLAN NOTE
Presumed Cholangitis  Developed septic shock on 8/22 after ERCP, some notes indicate pus seen during stent exchange, she then became hypotensive and required MICU admission for levophed. She had a troponin elevation, thought to be demand ischemia from hypotension in setting of recent STEMI. She was on vanc/cefepime and transferred to  on 8/25, she was noted to have new spastic movements, cefipime changed to cipro/flagyl due to high vanc trough to avoid nephrotoxicity with zosyn.   - Vanc/cefepime/flagyl - plan to continue until at least 8/30 per GI recs  - Follow up bcx, NGTD, last done 8/25 for fever  - Continued elevated WBC and fatigue  - If no improvement may consult ID, unfortunately no culture data was sent from ERCP  - If hypotensive again would be very cautious with fluids due to recent STEMI and HFrEF

## 2023-08-26 LAB
ALBUMIN SERPL BCP-MCNC: 1.3 G/DL (ref 3.5–5.2)
ALP SERPL-CCNC: 678 U/L (ref 55–135)
ALT SERPL W/O P-5'-P-CCNC: 72 U/L (ref 10–44)
ANION GAP SERPL CALC-SCNC: 11 MMOL/L (ref 8–16)
ANION GAP SERPL CALC-SCNC: 9 MMOL/L (ref 8–16)
APTT PPP: 48.1 SEC (ref 21–32)
AST SERPL-CCNC: 134 U/L (ref 10–40)
BACTERIA STL CULT: NORMAL
BASOPHILS # BLD AUTO: 0.02 K/UL (ref 0–0.2)
BASOPHILS NFR BLD: 0.2 % (ref 0–1.9)
BILIRUB SERPL-MCNC: 4 MG/DL (ref 0.1–1)
BUN SERPL-MCNC: 18 MG/DL (ref 8–23)
BUN SERPL-MCNC: 18 MG/DL (ref 8–23)
CALCIUM SERPL-MCNC: 8 MG/DL (ref 8.7–10.5)
CALCIUM SERPL-MCNC: 8.6 MG/DL (ref 8.7–10.5)
CHLORIDE SERPL-SCNC: 105 MMOL/L (ref 95–110)
CHLORIDE SERPL-SCNC: 107 MMOL/L (ref 95–110)
CO2 SERPL-SCNC: 20 MMOL/L (ref 23–29)
CO2 SERPL-SCNC: 22 MMOL/L (ref 23–29)
CREAT SERPL-MCNC: 1.2 MG/DL (ref 0.5–1.4)
CREAT SERPL-MCNC: 1.4 MG/DL (ref 0.5–1.4)
CREAT UR-MCNC: 92 MG/DL (ref 15–325)
DIFFERENTIAL METHOD: ABNORMAL
EOSINOPHIL # BLD AUTO: 0.1 K/UL (ref 0–0.5)
EOSINOPHIL NFR BLD: 0.8 % (ref 0–8)
ERYTHROCYTE [DISTWIDTH] IN BLOOD BY AUTOMATED COUNT: 18.4 % (ref 11.5–14.5)
EST. GFR  (NO RACE VARIABLE): 42.5 ML/MIN/1.73 M^2
EST. GFR  (NO RACE VARIABLE): 51.2 ML/MIN/1.73 M^2
GLUCOSE SERPL-MCNC: 137 MG/DL (ref 70–110)
GLUCOSE SERPL-MCNC: 99 MG/DL (ref 70–110)
HCT VFR BLD AUTO: 27 % (ref 37–48.5)
HGB BLD-MCNC: 8.4 G/DL (ref 12–16)
IMM GRANULOCYTES # BLD AUTO: 0.1 K/UL (ref 0–0.04)
IMM GRANULOCYTES NFR BLD AUTO: 0.8 % (ref 0–0.5)
INR PPP: 1.5 (ref 0.8–1.2)
LYMPHOCYTES # BLD AUTO: 0.9 K/UL (ref 1–4.8)
LYMPHOCYTES NFR BLD: 7 % (ref 18–48)
MAGNESIUM SERPL-MCNC: 2.4 MG/DL (ref 1.6–2.6)
MCH RBC QN AUTO: 26.1 PG (ref 27–31)
MCHC RBC AUTO-ENTMCNC: 31.1 G/DL (ref 32–36)
MCV RBC AUTO: 84 FL (ref 82–98)
MONOCYTES # BLD AUTO: 0.7 K/UL (ref 0.3–1)
MONOCYTES NFR BLD: 5.7 % (ref 4–15)
NEUTROPHILS # BLD AUTO: 11.2 K/UL (ref 1.8–7.7)
NEUTROPHILS NFR BLD: 85.5 % (ref 38–73)
NRBC BLD-RTO: 0 /100 WBC
PLATELET # BLD AUTO: 227 K/UL (ref 150–450)
PMV BLD AUTO: 12.2 FL (ref 9.2–12.9)
POCT GLUCOSE: 100 MG/DL (ref 70–110)
POCT GLUCOSE: 112 MG/DL (ref 70–110)
POCT GLUCOSE: 126 MG/DL (ref 70–110)
POCT GLUCOSE: 142 MG/DL (ref 70–110)
POTASSIUM SERPL-SCNC: 3.5 MMOL/L (ref 3.5–5.1)
POTASSIUM SERPL-SCNC: 4 MMOL/L (ref 3.5–5.1)
PROT SERPL-MCNC: 6.8 G/DL (ref 6–8.4)
PROTHROMBIN TIME: 15.3 SEC (ref 9–12.5)
RBC # BLD AUTO: 3.22 M/UL (ref 4–5.4)
SODIUM SERPL-SCNC: 136 MMOL/L (ref 136–145)
SODIUM SERPL-SCNC: 138 MMOL/L (ref 136–145)
SODIUM UR-SCNC: 19 MMOL/L (ref 20–250)
VANCOMYCIN SERPL-MCNC: 19.6 UG/ML
WBC # BLD AUTO: 13.08 K/UL (ref 3.9–12.7)

## 2023-08-26 PROCEDURE — 25000003 PHARM REV CODE 250: Performed by: STUDENT IN AN ORGANIZED HEALTH CARE EDUCATION/TRAINING PROGRAM

## 2023-08-26 PROCEDURE — 80053 COMPREHEN METABOLIC PANEL: CPT

## 2023-08-26 PROCEDURE — 51701 INSERT BLADDER CATHETER: CPT

## 2023-08-26 PROCEDURE — 20600001 HC STEP DOWN PRIVATE ROOM

## 2023-08-26 PROCEDURE — 25000003 PHARM REV CODE 250: Performed by: INTERNAL MEDICINE

## 2023-08-26 PROCEDURE — 97168 OT RE-EVAL EST PLAN CARE: CPT

## 2023-08-26 PROCEDURE — 63600175 PHARM REV CODE 636 W HCPCS: Performed by: EMERGENCY MEDICINE

## 2023-08-26 PROCEDURE — 97530 THERAPEUTIC ACTIVITIES: CPT

## 2023-08-26 PROCEDURE — 83735 ASSAY OF MAGNESIUM: CPT | Performed by: STUDENT IN AN ORGANIZED HEALTH CARE EDUCATION/TRAINING PROGRAM

## 2023-08-26 PROCEDURE — 36415 COLL VENOUS BLD VENIPUNCTURE: CPT | Performed by: STUDENT IN AN ORGANIZED HEALTH CARE EDUCATION/TRAINING PROGRAM

## 2023-08-26 PROCEDURE — 99233 PR SUBSEQUENT HOSPITAL CARE,LEVL III: ICD-10-PCS | Mod: ,,, | Performed by: STUDENT IN AN ORGANIZED HEALTH CARE EDUCATION/TRAINING PROGRAM

## 2023-08-26 PROCEDURE — 63600175 PHARM REV CODE 636 W HCPCS: Performed by: STUDENT IN AN ORGANIZED HEALTH CARE EDUCATION/TRAINING PROGRAM

## 2023-08-26 PROCEDURE — 85610 PROTHROMBIN TIME: CPT | Performed by: STUDENT IN AN ORGANIZED HEALTH CARE EDUCATION/TRAINING PROGRAM

## 2023-08-26 PROCEDURE — 82570 ASSAY OF URINE CREATININE: CPT | Performed by: STUDENT IN AN ORGANIZED HEALTH CARE EDUCATION/TRAINING PROGRAM

## 2023-08-26 PROCEDURE — 99233 SBSQ HOSP IP/OBS HIGH 50: CPT | Mod: ,,, | Performed by: STUDENT IN AN ORGANIZED HEALTH CARE EDUCATION/TRAINING PROGRAM

## 2023-08-26 PROCEDURE — 80048 BASIC METABOLIC PNL TOTAL CA: CPT | Mod: XB | Performed by: STUDENT IN AN ORGANIZED HEALTH CARE EDUCATION/TRAINING PROGRAM

## 2023-08-26 PROCEDURE — 85730 THROMBOPLASTIN TIME PARTIAL: CPT | Performed by: INTERNAL MEDICINE

## 2023-08-26 PROCEDURE — 84300 ASSAY OF URINE SODIUM: CPT | Performed by: STUDENT IN AN ORGANIZED HEALTH CARE EDUCATION/TRAINING PROGRAM

## 2023-08-26 PROCEDURE — 80202 ASSAY OF VANCOMYCIN: CPT | Performed by: STUDENT IN AN ORGANIZED HEALTH CARE EDUCATION/TRAINING PROGRAM

## 2023-08-26 PROCEDURE — 85025 COMPLETE CBC W/AUTO DIFF WBC: CPT | Performed by: STUDENT IN AN ORGANIZED HEALTH CARE EDUCATION/TRAINING PROGRAM

## 2023-08-26 PROCEDURE — 51798 US URINE CAPACITY MEASURE: CPT

## 2023-08-26 PROCEDURE — 63600175 PHARM REV CODE 636 W HCPCS

## 2023-08-26 RX ORDER — POTASSIUM CHLORIDE 7.45 MG/ML
10 INJECTION INTRAVENOUS
Status: DISCONTINUED | OUTPATIENT
Start: 2023-08-26 | End: 2023-08-26

## 2023-08-26 RX ORDER — POTASSIUM CHLORIDE 750 MG/1
30 CAPSULE, EXTENDED RELEASE ORAL
Status: DISCONTINUED | OUTPATIENT
Start: 2023-08-26 | End: 2023-08-26

## 2023-08-26 RX ADMIN — ASPIRIN 81 MG: 81 TABLET, COATED ORAL at 09:08

## 2023-08-26 RX ADMIN — ONDANSETRON 4 MG: 2 INJECTION INTRAMUSCULAR; INTRAVENOUS at 07:08

## 2023-08-26 RX ADMIN — CIPROFLOXACIN 400 MG: 2 INJECTION, SOLUTION INTRAVENOUS at 03:08

## 2023-08-26 RX ADMIN — PROMETHAZINE HYDROCHLORIDE 12.5 MG: 25 INJECTION, SOLUTION INTRAMUSCULAR; INTRAVENOUS at 01:08

## 2023-08-26 RX ADMIN — VANCOMYCIN HYDROCHLORIDE 750 MG: 750 INJECTION, POWDER, LYOPHILIZED, FOR SOLUTION INTRAVENOUS at 09:08

## 2023-08-26 RX ADMIN — CLOPIDOGREL BISULFATE 75 MG: 75 TABLET ORAL at 09:08

## 2023-08-26 RX ADMIN — POTASSIUM CHLORIDE 10 MEQ: 7.46 INJECTION, SOLUTION INTRAVENOUS at 04:08

## 2023-08-26 RX ADMIN — METRONIDAZOLE 500 MG: 500 INJECTION, SOLUTION INTRAVENOUS at 01:08

## 2023-08-26 RX ADMIN — POTASSIUM CHLORIDE 30 MEQ: 10 CAPSULE, COATED, EXTENDED RELEASE ORAL at 09:08

## 2023-08-26 RX ADMIN — SODIUM CHLORIDE, POTASSIUM CHLORIDE, SODIUM LACTATE AND CALCIUM CHLORIDE 500 ML: 600; 310; 30; 20 INJECTION, SOLUTION INTRAVENOUS at 11:08

## 2023-08-26 RX ADMIN — METRONIDAZOLE 500 MG: 500 INJECTION, SOLUTION INTRAVENOUS at 08:08

## 2023-08-26 RX ADMIN — PROCHLORPERAZINE MALEATE 5 MG: 5 TABLET ORAL at 09:08

## 2023-08-26 RX ADMIN — ENOXAPARIN SODIUM 40 MG: 40 INJECTION SUBCUTANEOUS at 05:08

## 2023-08-26 RX ADMIN — ESCITALOPRAM OXALATE 10 MG: 10 TABLET ORAL at 09:08

## 2023-08-26 RX ADMIN — PROCHLORPERAZINE MALEATE 5 MG: 5 TABLET ORAL at 05:08

## 2023-08-26 RX ADMIN — PROMETHAZINE HYDROCHLORIDE 12.5 MG: 25 INJECTION, SOLUTION INTRAMUSCULAR; INTRAVENOUS at 11:08

## 2023-08-26 RX ADMIN — METRONIDAZOLE 500 MG: 500 INJECTION, SOLUTION INTRAVENOUS at 04:08

## 2023-08-26 RX ADMIN — PROMETHAZINE HYDROCHLORIDE 12.5 MG: 25 INJECTION, SOLUTION INTRAMUSCULAR; INTRAVENOUS at 12:08

## 2023-08-26 NOTE — PT/OT/SLP RE-EVAL
Occupational Therapy  Re-evaluation and Treatment    Name: Mariann Huff  MRN: [id number]  Admitting Diagnosis:  Shock  Recent Surgery: Procedure(s) (LRB):  ERCP (ENDOSCOPIC RETROGRADE CHOLANGIOPANCREATOGRAPHY) (N/A) 4 Days Post-Op    Recommendations:     Discharge Recommendations: Nursing facility, Skilled  Discharge Equipment Recommendations: walker, rolling, to be determined by next level of care  Barriers to discharge:  Other (Comment) (increased A required)    Assessment:     Mariann Huff is a 63 y.o. female with a medical diagnosis of Shock.  She presents with performance deficits affecting function are weakness, impaired endurance, impaired self care skills, impaired functional mobility, gait instability, impaired balance, decreased upper extremity function, decreased lower extremity function.  Pt agreeable to therapy. Pt performed bed mobility, EOB sitting and ADL tasks, and attempted transfers but was limited by dizziness. Pt was able to partially stand to scoot to HOB. Pt participates well and is motivated to regain functional independence in mobility and self care.      Rehab Prognosis:  Good; patient would benefit from acute skilled OT services to address these deficits and reach maximum level of function.       Plan:     Patient to be seen 4 x/week to address the above listed problems via self-care/home management, therapeutic activities, therapeutic exercises, neuromuscular re-education  Plan of Care Expires: 09/26/23  Plan of Care Reviewed with: patient    Subjective     Chief Complaint: Nausea/dizziness  Patient/Family stated goals: Get well  Communicated with: RN prior to session.  Pain/Comfort:  Pain Rating 1: 0/10  Pain Rating Post-Intervention 1: 0/10    Objective:     Communicated with: RN prior to session.  Patient found HOB elevated with: peripheral IV, telemetry, pulse ox (continuous), blood pressure cuff, PureWick upon OT entry to room.    General Precautions: Standard, fall  Orthopedic  Precautions: N/A  Braces: N/A  Respiratory Status: Room air    Occupational Performance:    Bed Mobility:    Patient completed Supine to Sit with minimum assistance  Patient completed Sit to Supine with minimum assistance    Functional Mobility/Transfers:  Patient completed Sit <> partial Stand Transfer with minimum assistance  with  no assistive device   Functional Mobility: did not occur due to dizziness    Activities of Daily Living:  Grooming: stand by assistance washcloth to face, wiping mouth after vomiting  Upper Body Dressing: maximal assistance gown  Lower Body Dressing: maximal assistance socks    Cognitive/Visual Perceptual:  Cognitive/Psychosocial Skills:     -       Oriented to: Person, Place, Time, and Situation   -       Follows Commands/attention:Follows multistep  commands  -       Safety awareness/insight to disability: intact     Physical Exam:  Upper Extremity Range of Motion:     -       Right Upper Extremity: WFL  -       Left Upper Extremity: WFL  Upper Extremity Strength:    -       Right Upper Extremity: 3-/5  -       Left Upper Extremity: 3-/5   Strength:    -       Right Upper Extremity: Fair  -       Left Upper Extremity: Fair  Fine Motor Coordination:    -       Intact  Gross motor coordination:   WFL    AMPAC 6 Click:  AMPAC Total Score: 17    Treatment & Education:  Pt edu re OT role, POC and safety.  Pt worked on EOB sitting and upright tolerance.    Patient left HOB elevated with all lines intact and call button in reach    GOALS:   Multidisciplinary Problems       Occupational Therapy Goals          Problem: Occupational Therapy    Goal Priority Disciplines Outcome Interventions   Occupational Therapy Goal     OT, PT/OT Ongoing, Progressing    Description: Goals to be met by: 9/17/23 (1 month)     Patient will increase functional independence with ADLs by performing:    UE Dressing with Supervision.  LE Dressing with Supervision.  Grooming while standing at sink with Modified  [place].  Toileting from toilet with Modified [place] for hygiene and clothing management.   Step transfer with Supervision  Toilet transfer to toilet with Stand-by Assistance.                         History:     Past Medical History:   Diagnosis Date    Anticoagulant long-term use     Asthma     Back pain     Bradycardia, unspecified 05/08/2019    The etiology of the bradycardic episode is unclear.  The have appear to be respiratory in origin (at least the 1st episode).  We will continue to monitor carefully.  We are awaiting evaluation by Cardiology.      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, with long-term current use of insulin 01/09/2020         Past Surgical History:   Procedure Laterality Date    ANGIOGRAM, CORONARY, WITH LEFT HEART CATHETERIZATION N/A 8/3/2023    Procedure: Angiogram, Coronary, with Left Heart Cath;  Surgeon: Aime George MD;  Location: [facility] CATH LAB;  Service: Cardiology;  Laterality: N/A;    ANGIOGRAM, CORONARY, WITH LEFT HEART CATHETERIZATION N/A 8/14/2023    Procedure: Angiogram, Coronary, with Left Heart Cath;  Surgeon: Wilman Kim MD;  Location: [facility] CATH LAB;  Service: Cardiology;  Laterality: N/A;    ANTEGRADE NEPHROSTOGRAPHY Left 12/11/2019    Procedure: Nephrostogram - antegrade;  Surgeon: Robin Boyd MD;  Location: [address]  FLR;  Service: Urology;  Laterality: Left;    BRONCHOSCOPY N/A 2019    Procedure: BRONCHOSCOPY;  Surgeon: Sean Ruano MD;  [facility];  Service: General;  Laterality: N/A;    CARDIAC CATHETERIZATION      CATARACT EXTRACTION      cataract extraction left eye      cataracts      CAUDAL EPIDURAL STEROID INJECTION N/A 2019    Procedure: Injection-steroid-epidural-caudal;  Surgeon: Dave Bentley Jr., MD;  Location: [facility] PAIN MGT;  Service: Pain Management;  Laterality: N/A;     SECTION, LOW TRANSVERSE      COLONOSCOPY N/A 2019    Procedure: COLONOSCOPY;  Surgeon: Al Alaniz MD;  Location: [facility] ENDO (2ND FLR);  Service: Endoscopy;  Laterality: N/A;    CORONARY ANGIOPLASTY      CYSTOGRAM N/A 2019    Procedure: CYSTOGRAM INTRAOP;  Surgeon: Robin Boyd MD;  [facility];  Service: Urology;  Laterality: N/A;  1 HOUR    CYSTOSCOPY W/ RETROGRADES Left 2019    Procedure: CYSTOSCOPY, WITH RETROGRADE PYELOGRAM;  Surgeon: Robin Boyd MD;  [facility];  Service: Urology;  Laterality: Left;  fluro    ENDOSCOPIC ULTRASOUND OF UPPER GASTROINTESTINAL TRACT N/A 6/15/2023    Procedure: ULTRASOUND, UPPER GI TRACT, ENDOSCOPIC;  Surgeon: Lisa Kim MD;  [facility]  Service: Endoscopy;  Laterality: N/A;    ENDOSCOPIC ULTRASOUND OF UPPER GASTROINTESTINAL TRACT  2023    Procedure: ULTRASOUND, UPPER GI TRACT, ENDOSCOPIC;  Surgeon: Wayne Adamson MD;  [facility]  Service: Endoscopy;;    ERCP N/A 6/15/2023    Procedure: ERCP (ENDOSCOPIC RETROGRADE CHOLANGIOPANCREATOGRAPHY);  Surgeon: Lisa Kim MD;  [facility]  Service: Endoscopy;  Laterality: N/A;    ERCP N/A 2023    Procedure: ERCP (ENDOSCOPIC RETROGRADE CHOLANGIOPANCREATOGRAPHY);  Surgeon: Wayne Adamson MD;  [facility]  Service: Endoscopy;  Laterality: N/A;  eliquis and pletal ok to hold-see te  7/26/23-dr [id number] portal    ERCP N/A 8/17/2023    Procedure: ERCP (ENDOSCOPIC RETROGRADE CHOLANGIOPANCREATOGRAPHY);  Surgeon: Wayne Adamson MD;  Location: [facility] ENDO (2ND FLR);  Service: Endoscopy;  Laterality: N/A;    ERCP N/A 8/22/2023    Procedure: ERCP (ENDOSCOPIC RETROGRADE CHOLANGIOPANCREATOGRAPHY);  Surgeon: Christian Huff MD;  Location: [facility] ENDO (2ND FLR);  Service: Endoscopy;  Laterality: N/A;    ESOPHAGOGASTRODUODENOSCOPY W/ PEG  5/2/2019    Procedure: EGD, WITH PEG TUBE INSERTION;  Surgeon: Sean Ruano MD;  Location: [facility] OR 2ND FLR;  Service: General;;    EXCISION TURBINATE, SUBMUCOUS      FLEXIBLE SIGMOIDOSCOPY N/A 5/13/2019    Procedure: SIGMOIDOSCOPY, FLEXIBLE;  Surgeon: ALBERTO Amin MD;  Location: [facility] ENDO (2ND FLR);  Service: Endoscopy;  Laterality: N/A;    FLEXIBLE SIGMOIDOSCOPY N/A 5/21/2019    Procedure: SIGMOIDOSCOPY, FLEXIBLE;  Surgeon: ALBERTO Amin MD;  Location: [facility] ENDO (2ND FLR);  Service: Endoscopy;  Laterality: N/A;    FUSION OF LUMBAR SPINE BY ANTERIOR APPROACH Left 4/12/2019    Procedure: FUSION, SPINE, LUMBAR, ANTERIOR APPROACH Left L5-S1 Anterior to Psoa Interbody Fusion, L5-S1 Posterior Instrumentation;  Surgeon: Mk George MD;  [facility];  Service: Neurosurgery;  Laterality: Left;  Porcedure:  Left L5-S1 Anterior to Psoa Interbody Fusion, L5-S1 Posterior Instrumentation  Surgery Time: 4 Hrs  LOS: 2-3  Anesthesia: General   Blood: Type & Screen  R    HAND SURGERY Left     HAND SURGERY Right     torn ligament repair/ Dr. Yeboah    HYSTERECTOMY      INJECTION OF STEROID Right 12/10/2020    Procedure: INJECTION, STEROID Right SI Joint Block and Steroid Injection;  Surgeon: Mk George MD;  Location: [facility];  Service: Neurosurgery;  Laterality: Right;  Procedure: Right SI Joint Block and Steroid Injection   SUrgery Time: 30 Min  LOS: 0  Anesthesia: MAC  Radiology: C-arm  Bed: [place] [id number] Poster  Position: Prone    INJECTION OF  STEROID Right 9/28/2021    Procedure: INJECTION, STEROID Right SI joint block & steroid injection;  Surgeon: Mk George MD;  Location: [facility];  Service: Neurosurgery;  Laterality: Right;  Procedure: Right SI joint block & steroid injection  Surgery Time: 30m  Anesthesia: Local MAC  Radiology: C-arm  Bed: Regular  Position: Prone    IVUS, CORONARY  8/14/2023    Procedure: IVUS, Coronary;  Surgeon: Wilman Kim MD;  Location[facility] CATH LAB;  Service: Cardiology;;    LAPAROSCOPIC CHOLECYSTECTOMY N/A 6/23/2023    Procedure: CHOLECYSTECTOMY, LAPAROSCOPIC;  Surgeon: Richard Penny MD;  Location: [address];  Service: General;  Laterality: N/A;    left foot surgery      left wrist surgery      LYSIS OF ADHESIONS N/A 2/19/2020    Procedure: LYSIS, ADHESIONS;  Surgeon: Robin Boyd MD;  Location: [address];  Service: Urology;  Laterality: N/A;    NASAL SEPTUM SURGERY  5/7/15    OPEN REDUCTION AND INTERNAL FIXATION (ORIF) OF FRACTURE OF PROXIMAL HUMERUS Left 7/12/2023    Procedure: ORIF, FRACTURE, HUMERUS, PROXIMAL ;  Surgeon: Tomasz Leary MD;  Location: [address];  Service: Orthopedics;  Laterality: Left;    PERCUTANEOUS NEPHROSTOMY Left 4/21/2019    Procedure: Creation, Nephrostomy, Percutaneous;  Surgeon: Karina Surgeon;  Location: [facility];  Service: Anesthesiology;  Laterality: Left;    REPAIR OF URETER  4/12/2019    Procedure: REPAIR, URETER;  Surgeon: Mk George MD;  Location: [address];  Service: Neurosurgery;;    REPLACEMENT OF NEPHROSTOMY TUBE N/A 7/18/2019    Procedure: REPLACEMENT, NEPHROSTOMY TUBE;  Surgeon: [facility] Diagnostic Provider;  Location: [address];  Service: Anesthesiology;  Laterality: N/A;  188    REPLACEMENT OF NEPHROSTOMY TUBE N/A 7/24/2019    Procedure: REPLACEMENT, NEPHROSTOMY TUBE;  Surgeon: [facility] Diagnostic Provider;  Location: [address];  Service: Anesthesiology;  Laterality: N/A;  188    REPLACEMENT OF NEPHROSTOMY TUBE N/A  10/7/2019    Procedure: REPLACEMENT, NEPHROSTOMY TUBE;  Surgeon: [facility] Diagnostic Provider;  [address]  Service: Anesthesiology;  Laterality: N/A;  189    REPLACEMENT OF NEPHROSTOMY TUBE N/A 11/25/2019    Procedure: REPLACEMENT, NEPHROSTOMY TUBE;  Surgeon: [facility] Diagnostic Provider;  [address]  Service: Anesthesiology;  Laterality: N/A;  Room 188/Bessy    REPLACEMENT OF NEPHROSTOMY TUBE Right 2/19/2020    Procedure: REPLACEMENT, NEPHROSTOMY TUBE removal removal;  Surgeon: Robin Boyd MD;  [address]  Service: Urology;  Laterality: Right;    RIGHT HEART CATHETERIZATION Right 8/3/2023    Procedure: INSERTION, CATHETER, RIGHT HEART;  Surgeon: Aime George MD;  Location: [facility] CATH LAB;  Service: Cardiology;  Laterality: Right;    rt elbow surgery      S/P LAD COATED STENT  05/14/2010    6 total     S/P OM1 STENT  08/2003    SINUS SURGERY      F.E.S.S.    STENT, DRUG ELUTING, SINGLE VESSEL, CORONARY  8/14/2023    Procedure: Stent, Drug Eluting, Single Vessel, Coronary;  Surgeon: Wilman Kim MD;  Location: [facility] CATH LAB;  Service: Cardiology;;    TRACHEOSTOMY N/A 5/2/2019    Procedure: CREATION, TRACHEOSTOMY;  Surgeon: Sean Ruano MD;  Location: [address];  Service: General;  Laterality: N/A;    TUBAL LIGATION      URETERAL REIMPLANTION Left 2/19/2020    Procedure: REIMPLANTATION, URETER WITH PSOAS HITCH;  Surgeon: Robin Boyd MD;  Location: [address];  Service: Urology;  Laterality: Left;    VENTRICULOGRAM, LEFT  8/3/2023    Procedure: Ventriculogram, Left;  Surgeon: Aime George MD;  Location: [facility] CATH LAB;  Service: Cardiology;;       Time Tracking:     OT Date of Treatment: 08/26/23  OT Start Time: 1543  OT Stop Time: 1606  OT Total Time (min): 23 min    Billable Minutes:Re-eval 15 minutes  Therapeutic Activity 8 minutes    PRIMITIVO Verdugo  8/26/2023  Pager: [phone]

## 2023-08-26 NOTE — PLAN OF CARE
"   08/26/23 1831   Readmission   Why were you hospitalized in the last 30 days? Shock/STEMI   Why were you readmitted? Alarmed about signs/symptoms;Related to previous admission   When you left the hospital how did you feel? "Okay."   When you left the hospital where did you go? Home with Family   Did patient/caregiver refused recommended DC plan? No   Tell me about what happened between when you left the hospital and the day you returned. Discharged to home with family. Started feeling bad within 4 days.   When did you start not feeling well? Within 4 days, began feeling bad.   Did you try to manage your symptoms your self? No   Did you call anyone? No   Did you try to see or did see a doctor or nurse before you came? No   Did you have  a follow-up appointment on discharge? Yes   Did you go? No  (Re-Admission after 1 week of discharge.)   Why?   (Re-Admission after 1 week of discharge.)   Was this a planned readmission? No       Dg Sultana LMSW   "

## 2023-08-26 NOTE — PROGRESS NOTES
Pharmacokinetic Assessment Follow Up: IV Vancomycin    Vancomycin serum concentration assessment(s):    The random level was drawn correctly and can be used to guide therapy at this time. The measurement is within the desired definitive target range of 15 to 20 mcg/mL.    Vancomycin Regimen Plan:    Scr is trending up. Vancomycin 750 mg IV x1 dose today  Re-dose when the random level is less than 20 mcg/mL, next level to be drawn at 06:00 on 8/27/23    Drug levels (last 3 results):  Recent Labs   Lab Result Units 08/25/23  0659 08/26/23  0431   Vancomycin, Random ug/mL  --  19.6   Vancomycin-Trough ug/mL 36.0*  --        Pharmacy will continue to follow and monitor vancomycin.    Please contact pharmacy at extension [phone] for questions regarding this assessment.    Thank you for the consult,   Vipul Cabral       Patient brief summary:  Mariann Huff is a 63 y.o. female initiated on antimicrobial therapy with IV Vancomycin for treatment of sepsis    The patient's current regimen is pulse dosing    Drug Allergies:   Review of patient's allergies indicates:   Allergen Reactions    Milk containing products (dairy)      Causes GI distress       Actual Body Weight:   60.8 kg    Renal Function:   Estimated Creatinine Clearance: 36 mL/min (based on SCr of 1.4 mg/dL).,     Dialysis Method (if applicable):  N/A    CBC (last 72 hours):  Recent Labs   Lab Result Units 08/24/23 0418 08/25/23 0659 08/26/23  0431   WBC K/uL 13.50* 13.83* 13.08*   Hemoglobin g/dL 9.6* 8.6* 8.4*   Hematocrit % 30.7* 27.3* 27.0*   Platelets K/uL 290 241 227   Gran % % 77.0* 88.5* 85.5*   Lymph % % 2.0* 5.9* 7.0*   Mono % % 5.0 3.6* 5.7   Eosinophil % % 0.0 1.3 0.8   Basophil % % 0.0 0.1 0.2   Differential Method  Manual Automated Automated       Metabolic Panel (last 72 hours):  Recent Labs   Lab Result Units 08/23/23  1337 08/24/23  0418 08/25/23  0659 08/26/23  0431   Sodium mmol/L  --  140 136 138   Potassium mmol/L  --  4.0 3.7 3.5   Chloride  mmol/L  --  105 105 105   CO2 mmol/L  --  23 22* 22*   Glucose mg/dL  --  149* 145* 99   Glucose, UA  1+*  --   --   --    BUN mg/dL  --  16 16 18   Creatinine mg/dL  --  1.0 1.1 1.4   Albumin g/dL  --  1.5* 1.3* 1.3*   Total Bilirubin mg/dL  --  5.7* 4.3* 4.0*   Alkaline Phosphatase U/L  --  843* 689* 678*   AST U/L  --  351* 196* 134*   ALT U/L  --  120* 86* 72*   Magnesium mg/dL  --  2.2 2.3 2.4       Vancomycin Administrations:  vancomycin given in the last 96 hours                     vancomycin (VANCOCIN) 1,000 mg in dextrose 5 % (D5W) 250 mL IVPB (Vial-Mate) (mg) 1,000 mg New Bag 08/25/23 0430      Restarted 08/24/23 0632      Restarted  0611     1,000 mg New Bag  0600    vancomycin 1,250 mg in dextrose 5 % (D5W) 250 mL IVPB (Vial-Mate) (mg) 1,250 mg New Bag 08/23/23 0504                    Microbiologic Results:  Microbiology Results (last 7 days)       Procedure Component Value Units Date/Time    Blood culture [[id number]] Collected: 08/23/23 0046    Order Status: Completed Specimen: Blood Updated: 08/26/23 0612     Blood Culture, Routine No Growth to date      No Growth to date      No Growth to date      No Growth to date    Narrative:      Collection has been rescheduled by ANH1 at 08/22/2023 23:52 Reason:   Pt in ct  Collection has been rescheduled by ANH1 at 08/22/2023 23:52 Reason:   Pt in ct    Blood culture [[id number]] Collected: 08/23/23 0046    Order Status: Completed Specimen: Blood Updated: 08/26/23 0612     Blood Culture, Routine No Growth to date      No Growth to date      No Growth to date      No Growth to date    Narrative:      Collection has been rescheduled by AN at 08/22/2023 23:52 Reason:   Pt in ct  Collection has been rescheduled by ANH1 at 08/22/2023 23:52 Reason:   Pt in ct    Blood culture [[id number]] Collected: 08/25/23 1206    Order Status: Completed Specimen: Blood Updated: 08/25/23 1915     Blood Culture, Routine No Growth to date    Blood culture [[id number]] Collected:  08/25/23 1202    Order Status: Completed Specimen: Blood Updated: 08/25/23 1915     Blood Culture, Routine No Growth to date    Stool culture [[id number]] Collected: 08/24/23 1308    Order Status: Completed Specimen: Stool Updated: 08/25/23 1404     Stool Culture Culture in progress    E. coli 0157 antigen [[id number]] Collected: 08/24/23 1308    Order Status: Completed Specimen: Stool Updated: 08/25/23 1112     Shiga Toxin 1 E.coli Negative     Shiga Toxin 2 E.coli Negative    Clostridium difficile EIA [[id number]] Collected: 08/24/23 1308    Order Status: Completed Specimen: Stool Updated: 08/24/23 2225     C. diff Antigen Negative     C difficile Toxins A+B, EIA Negative     Comment: Testing not recommended for children <24 months old.       Urine culture [[id number]] Collected: 08/23/23 0235    Order Status: Completed Specimen: Urine Updated: 08/24/23 1358     Urine Culture, Routine No significant growth    Narrative:      Specimen Source->Urine    Blood culture [[id number]]     Order Status: Canceled Specimen: Blood

## 2023-08-26 NOTE — PLAN OF CARE
Pt Aox4 and follows commands. SR on tele. Room air. Slow to respond. Very nauseous throughout night despite nausea medication administration. Pt denies pain. No acute events overnight. Bed in lowest position and call light within reach.

## 2023-08-26 NOTE — CARE UPDATE
BG goal 140-180    -A1C   Lab Results   Component Value Date    HGBA1C 8.8 (H) 07/10/2023       -HOME REGIMEN: Glipizide ER 10mg before breakfast. Levemir 2u daily on a sliding scale     -INPATIENT REGIMEN: Novolog Correction Scale     -GLUCOSE TREND FOR THE PAST 24HRS: 110-204    -NO HYPOGYCEMIAS NOTED     -TOLERATING <25% OF PO DIET     -Diet: Diet diabetic Low Sodium,2gm; 2000 Calorie; Fluid - 2000mL      -Steroids -  N/A     -Tube Feeds - N/A       Remains in CSU. NAEON. BG  well controlled today; N/V today so did not get prandial insulin.       Plan:  - Novolog 3 units TID with meals (0.3 u/kg dosing)   - Continue Moderate Dose Correction Scale  - BG monitoring ac/hs    Discharge planning: TBD    Endocrine to continue to follow    ** Please call Endocrine for any BG related issues **

## 2023-08-26 NOTE — ASSESSMENT & PLAN NOTE
Patient with acute kidney injury/acute renal failure likely due to pre-renal azotemia due to dehydration PAPA is currently worsening. Baseline creatinine 0.9 - Labs reviewed- Renal function/electrolytes with Estimated Creatinine Clearance: 36 mL/min (based on SCr of 1.4 mg/dL). according to latest data. Monitor urine output and serial BMP and adjust therapy as needed. Avoid nephrotoxins and renally dose meds for GFR listed above.  - Follow up urine lytes  - bladder scan  - 500cc lr bolus over 6 hours,  - PM BMP

## 2023-08-26 NOTE — NURSING
Pt AAO x 4, no c/o pain. Pt c/o nausea with intermittent vomiting throughout the shift. PRN medications rotated, as ordered with mild relief. Pt continues to be nauseous regardless of medications. Pt SR on telemetry monitor. Oxygen saturations 98% on room air. Bladder scan done with 918ml in bladder. MD notified. In and out catheter done with 950ml of orange, cloudy urine returned. Pt tolerated procedure well. Post residual bladder scan done with 0ml in bladder resulted. IV to right FA intact and flushes well. New IV to left FA obtained by PICC team. Pt tolerated IV start well. LFA IV flushes well. Pt changes position independently. Weight shifting encouraged throughout the day. Family supportive at the bedside.

## 2023-08-26 NOTE — PROGRESS NOTES
[facility] ([address] Medicine  Progress Note    Patient Name: Mariann Huff  MRN: [id number]  Patient Class: IP- Inpatient   Admission Date: 8/14/2023  Length of Stay: 12 days  Attending Physician: Baudliio Lozada MD  Primary Care Provider: Jasbir Haney MD        Subjective:     Principal Problem:Shock        HPI:  63 y.o. female with CAD s/p GINGER to ostial LAD in 2014, HTN, HLD, DM2, MURTAZA who presented to the ED to the ER after a near syncopal event at home around 4 pm. She also feels tired and her appetite lately has decreased. she had chest pain and visceral symptoms with nausea and vomiting. She reports losing weight as well.She has had abdominal symptoms with poor PO intake leading to orthostasis. Today she had another near syncopal event and is why she was brought to the ER. Here her ECG showed the posterior changes for which posterior EKG was obtained with showed STEMI. Bedside echo showed an EF 45-50% and norma-lateral and infero-lateral hypokinesis. She was taken to cath lab and her symptoms resolved after PCI. Please refer to PCI for full details.       Overview/Hospital Course:  Patient was taken to cath lab and her symptoms resolved after PCI. Patient chest pain resolved.     Patient reported development of acute abdominal pain associated with nausea and vomiting. Patient was recently admitted (6/23) for suspected biliary pancreatitis and biliary stricture treated with biliary sphincterotomy, biliary stent, and cholecystectomy.    Patient now has worsening transaminitis w/ elevated T-Bili.   Concern for stent occlusion. Patient amylase 107 wnl, and Lipase 61. Workup will include total abdominal ultrasound and [facility] was consulted . Was scheduled for outpatient w/u of hepatic lesions found on previous admission 8/2/23.  Patient had ERCP and EUS on 8/17/23. Visualized a partial occluded stent in the biliary tree. One covered metal stent was placed into  the common bile duct. Concern for metastatic cancer is high due to multiple liver lesions and mass on pancreatic head per [facility]. Initially on previous hospital admission, it was believed to more likely be liver abscesses rather than malignancy due to acute nature of progression. S/p FNA biopsy with ERCP and stent placement on 8/17.     Became hypoglycemic on 8/19 and Hgb dropped from 8 to 6.3 with a reported dark stool, Improved to 7.1 after 1 unit PRBC, could not get a second unit due to respiratory distress, no further episodes of melena. Seen by GI, did not recommend endoscopy at this time for possible GIB.  Given lasix 20mg IV then 40mg IV with good output and improvement in respiratory status.    T bili continued to rise on 8/21, KUB did not show stent migration, patient underwent ERCP again on 8/22 which demonstrated a visibly ocluded stent in the biliary tree with a single biliary stricture in main BD. One covered metal stent placed in CBD. Several hours following this procedure, patient developed rapid onset fever to 103 and rapid drop in BP from SBP 180s to SBP 80s. Patient admitted to MICU for further management. She was continued on vanc/cefepime, briefly on levophed and then weaned off. Still with intermittent AMS and elevateed WBC, febrile 8/25. Adjusted abx to vanc/cipro/flagyl with improvement in mentation. Oral intake still poor, PAPA with Cr 1.4 on 8/26      Interval History: NAEON, transferred to     Review of Systems   Constitutional:  Positive for fatigue.   Respiratory:  Negative for shortness of breath.    Cardiovascular:  Negative for chest pain.   Gastrointestinal:  Negative for abdominal pain.   Genitourinary:  Negative for dysuria and frequency.   Neurological:  Negative for dizziness and headaches.     Objective:     Vital Signs (Most Recent):  Temp: 98.6 °F (37 °C) (08/26/23 0750)  Pulse: 90 (08/26/23 1152)  Resp: 18 (08/26/23 0750)  BP: 124/69 (08/26/23 0750)  SpO2: 98 % (08/26/23 0750)  Vital Signs (24h Range):  Temp:  [98.6 °F (37 °C)-99.5 °F (37.5 °C)] 98.6 °F (37 °C)  Pulse:  [] 90  Resp:  [17-22] 18  SpO2:  [96 %-98 %] 98 %  BP: (103-142)/(55-69) 124/69     Weight: 60.8 kg (134 lb 0.6 oz)  Body mass index is 23.01 kg/m².  No intake or output data in the 24 hours ending 08/26/23 1154        Physical Exam  Constitutional:       Appearance: She is ill-appearing.   HENT:      Head: Normocephalic and atraumatic.   Cardiovascular:      Rate and Rhythm: Normal rate and regular rhythm.      Pulses: Normal pulses.      Heart sounds: Normal heart sounds.   Pulmonary:      Effort: Pulmonary effort is normal.      Breath sounds: Normal breath sounds.   Abdominal:      General: Abdomen is flat.      Tenderness: There is no abdominal tenderness.   Musculoskeletal:      Right lower leg: No edema.      Left lower leg: No edema.   Skin:     General: Skin is warm.      Capillary Refill: Capillary refill takes less than 2 seconds.      Coloration: Skin is not jaundiced or pale.   Neurological:      General: No focal deficit present.      Mental Status: She is alert and oriented to person, place, and time.      Comments: Also oriented to situation, responding quicker   Psychiatric:         Mood and Affect: Mood normal.             Significant Labs: All pertinent labs within the past 24 hours have been reviewed.  BMP:   Recent Labs   Lab 08/26/23  0431   GLU 99      K 3.5      CO2 22*   BUN 18   CREATININE 1.4   CALCIUM 8.6*   MG 2.4       CBC:   Recent Labs   Lab 08/25/23  0659 08/26/23  0431   WBC 13.83* 13.08*   HGB 8.6* 8.4*   HCT 27.3* 27.0*    227       CMP:   Recent Labs   Lab 08/25/23  0659 08/26/23  0431    138   K 3.7 3.5    105   CO2 22* 22*   * 99   BUN 16 18   CREATININE 1.1 1.4   CALCIUM 8.2* 8.6*   PROT 6.5 6.8   ALBUMIN 1.3* 1.3*   BILITOT 4.3* 4.0*   ALKPHOS 689* 678*   * 134*   ALT 86* 72*   ANIONGAP 9 11       Lactic Acid:   Recent Labs   Lab  "08/25/23  1604   LACTATE 1.4     Troponin: No results for input(s): "TROPONINI", "TROPONINIHS" in the last 48 hours.    Urine Culture: No results for input(s): "LABURIN" in the last 48 hours.  Urine Studies: No results for input(s): "COLORU", "APPEARANCEUA", "PHUR", "SPECGRAV", "PROTEINUA", "GLUCUA", "KETONESU", "BILIRUBINUA", "OCCULTUA", "NITRITE", "UROBILINOGEN", "LEUKOCYTESUR", "RBCUA", "WBCUA", "BACTERIA", "SQUAMEPITHEL", "HYALINECASTS" in the last 48 hours.    Invalid input(s): "WRIGHTSUR"    Significant Imaging: I have reviewed all pertinent imaging results/findings within the past 24 hours.      Assessment/Plan:      * Shock  Presumed Cholangitis  Developed septic shock on 8/22 after ERCP, some notes indicate pus seen during stent exchange, she then became hypotensive and required MICU admission for levophed. She had a troponin elevation, thought to be demand ischemia from hypotension in setting of recent STEMI. She was on vanc/cefepime and transferred to  on 8/25, she was noted to have new spastic movements, cefipime changed to cipro/flagyl due to high vanc trough to avoid nephrotoxicity with zosyn.   - Vanc/cefepime/flagyl - plan to continue until at least 8/30 per GI recs  - Follow up bcx, NGTD, last done 8/25 for fever  - Continued elevated WBC and fatigue  - Mentation improving on 8/26, continue abx  - If hypotensive again would be very cautious with fluids due to recent STEMI and HFrEF      Pancreatic cancer  Diagnosed this admission, will need follow up with oncology.       Atrial fibrillation  Patient with Paroxysmal (<7 days) atrial fibrillation which is controlled currently with Beta Blocker. Patient is currently in sinus rhythm.[id number] Score: 3. . Anticoagulation not indicated due to possible bleeding, was previously on lovenox but it is being held.    Biliary obstruction  Patient reports development of acute abdominal pain associated with nausea and vomiting. Patient was recently admitted " (6/23) for suspected biliary pancreatitis and biliary stricture treated with biliary sphincterotomy, biliary stent, and cholecystectomy s/p ERCP w/ metal stent on 8/17, abdominal pain has resolved but T bili is still rising     - Trend T bili daily  -[facility] consulted, s/p ERCP w/ biopsy on 8/17, transaminases and alk phos improving but bili is worsening  -S/p repeat ERCP 8/22 w/ septic shock from presume cholangitis, see septic shock above  - LFTs improving       CHF (congestive heart failure)  -On week prior to admission had CXR with B/L edema, , EF decreased to 40-45%  -Got IV lasix while inpatient and was discharged on 40 po daily however became dehydrated and weak and LVEDP -in the lab showed LVEDP 9, reduce dose to 20 po  - Holding all GDMT in setting of recent shock, will resume once infection is better controlled  - Holding lasix for now    TTE 8/15/23    Left Ventricle: The left ventricle is normal in size. Ventricular mass is normal. Normal wall thickness. regional wall motion abnormalities present. See diagram for wall motion findings. There is moderately reduced systolic function. Ejection fraction by visual approximation is 40%. Grade II diastolic dysfunction.    Left Atrium: Left atrium is mildly dilated. The left atrium volume index is 35.5 mL/m2.    Right Ventricle: Normal right ventricular cavity size. Wall thickness is normal. Right ventricle wall motion  is normal. Systolic function is normal.    Mitral Valve: There is mild regurgitation.    IVC/SVC: Normal venous pressure at 3 mmHg.    Liver parenchyma is inhomgenous.  Clinical correlation is required.      STEMI (ST elevation myocardial infarction)  -Started having near syncope around 4 pm on the day of admission  -Bedside echo in the ER showed an EF 45-50% and norma-lateral and infero-lateral hypokinesis  -She underwent Successful primary PCI of ramus intermedius and lateral branch with Pronto thrombectomy and 3X16 GINGER     Plan  - Continue  aspirin 81 mg daily, stop after 1 month to avoid triple therapy  - Loaded with brilinta 180, loaded with plavix 600 8/15/23, and 75 qd there after  - Continue high intensity statin therapy (LDL goal < 70)  - TTE shows ejection fraction of about 40% associated with grade II diastolic dysfunction.  - Hold lasix for now  - Continue ASA/plavix  - Developed troponin elevation in setting of septic shock as well, seen by cardiology, TTE unchanged, no need for repeat LHC      Other specified anemias  Had notable hemoglobin drop, c/f GIB, however stools turned brown by time of GI evaluation, held lovenox for afib, still holding lovenox, may resume if hgb continues to be stable. Received 2 units PRBC this admission  - Daily CBC  - Hgb goal >8 due to recent STEMI  - Hold lovenox for now      NSTEMI (non-ST elevated myocardial infarction)  During septic shock, see STEMI above for further discussion      History of pancreatitis  Recent (6/2023) suspected biliary pancreatitis and biliary stricture treated with biliary sphincterotomy, biliary stent, and cholecystectomy.  CT A/P with new innumerable hepatic lesions:  1. Innumerable low attenuating hepatic lesions, new since the previous exam.  Malignancy remains a concern however given short-term development, correlation is needed to exclude multifocal infection/abscess in this patient status post bile duct stent placement and cholecystectomy.  Please note, there is a low attenuating focus within the gallbladder fossa, similar to the foci throughout the hepatic parenchyma..  2. Pancreatic ductal dilatation up to 4 mm, minimally increased compared to prior.  There is fullness of the pancreatic head, underlying mass is not excluded, correlation with recent ERCP advised.  3. Simple and complex bilateral renal cysts.  4. Biliary stent in place with expected pneumobilia.  5. Cholecystectomy with scattered fluid about the gallbladder fossa.  6. Moderate stool throughout the colon, may  reflect developing constipation.  7.  Additional findings as above.     - concern for lesions seen on CT A/P, underwent biopsy, now confirm pancreatic CA  - Patient was found to have a hypoechoic pancreatic mass on abdominal US  - Was also found to have this mass on EUS concerning for malignancy   - EUS and ERCP 8/17/23 and then again on 8/22    Altered mental status  Fluctuating mentation since 8/22, at times forgetful and slow to respond, AAOx3 but not aware of situation, ammonia WNL, calcium normal, CT head w/ contrast without evidence of mets or abnormality. Improved on 8/26.      Type 2 diabetes mellitus with stage 2 chronic kidney disease and hypertension  Home Antihyperglycemic Regiment:  -Farxiga, glipizide, insulin daily at home  - Titrate and addition of insulin as needed for BG goal 140-180  - SSI with POCT accuchecks ACHS and Diabetic diet 2000 beti  - Diabetic nutritional counseling given   - Continue home gabapentin for diabetic peripheral neuropathy  - Appreciate Endocrinology recs  - Adjust insulin and monitor closely  - Endocrine following    PAPA (acute kidney injury)  Patient with acute kidney injury/acute renal failure likely due to pre-renal azotemia due to dehydration PAPA is currently worsening. Baseline creatinine 0.9 - Labs reviewed- Renal function/electrolytes with Estimated Creatinine Clearance: 36 mL/min (based on SCr of 1.4 mg/dL). according to latest data. Monitor urine output and serial BMP and adjust therapy as needed. Avoid nephrotoxins and renally dose meds for GFR listed above.  - Follow up urine lytes  - bladder scan  - 500cc lr bolus over 6 hours,  - PM BMP      VTE Risk Mitigation (From admission, onward)           Ordered     enoxaparin injection 40 mg  Every 24 hours         08/23/23 1026                    Discharge Planning   REBECCA: 8/28/2023     Code Status: Full Code   Is the patient medically ready for discharge?: No    Reason for patient still in hospital (select all that  apply): Patient new problem, Patient trending condition and Treatment  Discharge Plan A: Home Health   Discharge Delays: None known at this time              Baudilio Lozada MD  Department of Hospital Medicine   [facility] - Intensive Care ([place] [id number])

## 2023-08-26 NOTE — SUBJECTIVE & OBJECTIVE
Interval History: MINDY, transferred to     Review of Systems   Constitutional:  Positive for fatigue.   Respiratory:  Negative for shortness of breath.    Cardiovascular:  Negative for chest pain.   Gastrointestinal:  Negative for abdominal pain.   Genitourinary:  Negative for dysuria and frequency.   Neurological:  Negative for dizziness and headaches.     Objective:     Vital Signs (Most Recent):  Temp: 98.6 °F (37 °C) (08/26/23 0750)  Pulse: 90 (08/26/23 1152)  Resp: 18 (08/26/23 0750)  BP: 124/69 (08/26/23 0750)  SpO2: 98 % (08/26/23 0750) Vital Signs (24h Range):  Temp:  [98.6 °F (37 °C)-99.5 °F (37.5 °C)] 98.6 °F (37 °C)  Pulse:  [] 90  Resp:  [17-22] 18  SpO2:  [96 %-98 %] 98 %  BP: (103-142)/(55-69) 124/69     Weight: 60.8 kg (134 lb 0.6 oz)  Body mass index is 23.01 kg/m².  No intake or output data in the 24 hours ending 08/26/23 1154        Physical Exam  Constitutional:       Appearance: She is ill-appearing.   HENT:      Head: Normocephalic and atraumatic.   Cardiovascular:      Rate and Rhythm: Normal rate and regular rhythm.      Pulses: Normal pulses.      Heart sounds: Normal heart sounds.   Pulmonary:      Effort: Pulmonary effort is normal.      Breath sounds: Normal breath sounds.   Abdominal:      General: Abdomen is flat.      Tenderness: There is no abdominal tenderness.   Musculoskeletal:      Right lower leg: No edema.      Left lower leg: No edema.   Skin:     General: Skin is warm.      Capillary Refill: Capillary refill takes less than 2 seconds.      Coloration: Skin is not jaundiced or pale.   Neurological:      General: No focal deficit present.      Mental Status: She is alert and oriented to person, place, and time.      Comments: Also oriented to situation, responding quicker   Psychiatric:         Mood and Affect: Mood normal.             Significant Labs: All pertinent labs within the past 24 hours have been reviewed.  BMP:   Recent Labs   Lab 08/26/23  0431   GLU 99   NA  "138   K 3.5      CO2 22*   BUN 18   CREATININE 1.4   CALCIUM 8.6*   MG 2.4       CBC:   Recent Labs   Lab 08/25/23  0659 08/26/23  0431   WBC 13.83* 13.08*   HGB 8.6* 8.4*   HCT 27.3* 27.0*    227       CMP:   Recent Labs   Lab 08/25/23  0659 08/26/23  0431    138   K 3.7 3.5    105   CO2 22* 22*   * 99   BUN 16 18   CREATININE 1.1 1.4   CALCIUM 8.2* 8.6*   PROT 6.5 6.8   ALBUMIN 1.3* 1.3*   BILITOT 4.3* 4.0*   ALKPHOS 689* 678*   * 134*   ALT 86* 72*   ANIONGAP 9 11       Lactic Acid:   Recent Labs   Lab 08/25/23  1604   LACTATE 1.4     Troponin: No results for input(s): "TROPONINI", "TROPONINIHS" in the last 48 hours.    Urine Culture: No results for input(s): "LABURIN" in the last 48 hours.  Urine Studies: No results for input(s): "COLORU", "APPEARANCEUA", "PHUR", "SPECGRAV", "PROTEINUA", "GLUCUA", "KETONESU", "BILIRUBINUA", "OCCULTUA", "NITRITE", "UROBILINOGEN", "LEUKOCYTESUR", "RBCUA", "WBCUA", "BACTERIA", "SQUAMEPITHEL", "HYALINECASTS" in the last 48 hours.    Invalid input(s): "WRIGHTSUR"    Significant Imaging: I have reviewed all pertinent imaging results/findings within the past 24 hours.  "

## 2023-08-26 NOTE — ASSESSMENT & PLAN NOTE
Presumed Cholangitis  Developed septic shock on 8/22 after ERCP, some notes indicate pus seen during stent exchange, she then became hypotensive and required MICU admission for levophed. She had a troponin elevation, thought to be demand ischemia from hypotension in setting of recent STEMI. She was on vanc/cefepime and transferred to  on 8/25, she was noted to have new spastic movements, cefipime changed to cipro/flagyl due to high vanc trough to avoid nephrotoxicity with zosyn.   - Vanc/cefepime/flagyl - plan to continue until at least 8/30 per GI recs  - Follow up bcx, NGTD, last done 8/25 for fever  - Continued elevated WBC and fatigue  - Mentation improving on 8/26, continue abx  - If hypotensive again would be very cautious with fluids due to recent STEMI and HFrEF

## 2023-08-26 NOTE — PLAN OF CARE
Problem: Occupational Therapy  Goal: Occupational Therapy Goal  Description: Goals to be met by: 9/17/23 (1 month)     Patient will increase functional independence with ADLs by performing:    UE Dressing with Supervision.  LE Dressing with Supervision.  Grooming while standing at sink with [address].  Toileting from toilet with [address] for hygiene and clothing management.   Step transfer with Supervision  Toilet transfer to toilet with Stand-by Assistance.    Outcome: Ongoing, Progressing     OT re-eval completed. The above goals are established to improve functional (I) and mobility.

## 2023-08-26 NOTE — CLINICAL REVIEW
Sepsis Screen (most recent)       Sepsis Screen (IP) - 08/26/23 9941       Is the patient's history or complaint suggestive of a possible infection? Yes  -    Are there at least two of the following signs and symptoms present? Yes  -    Sepsis signs/symptoms - Tachycardia Tachycardia     >90  -    Sepsis signs/symptoms - WBC WBC < 4,000 or WBC > 12,000  -    Are any of the following organ dysfunction criteria present and not considered to be due to a chronic condition? Yes  -    Organ Dysfunction Criteria Total Bilirubin > 2.0 Platelet count < 100,000  -    Initiate Sepsis Protocol No  -    Reason sepsis not considered Pt. receiving appropriate management  -              User Key  (r) = Recorded By, (t) = Taken By, (c) = Cosigned By      Initials Name    Liliane Abdi RN

## 2023-08-26 NOTE — PLAN OF CARE
Problem: Diabetes Comorbidity  Goal: Blood Glucose Level Within Targeted Range  Outcome: Ongoing, Progressing     Problem: Fluid and Electrolyte Imbalance (Acute Kidney Injury/Impairment)  Goal: Fluid and Electrolyte Balance  Outcome: Ongoing, Progressing     Problem: Oral Intake Inadequate (Acute Kidney Injury/Impairment)  Goal: Optimal Nutrition Intake  Outcome: Ongoing, Progressing     Problem: Adult Inpatient Plan of Care  Goal: Plan of Care Review  Outcome: Ongoing, Progressing  Goal: Absence of Hospital-Acquired Illness or Injury  Outcome: Ongoing, Progressing  Goal: Optimal Comfort and Wellbeing  Outcome: Ongoing, Progressing     Problem: Fall Injury Risk  Goal: Absence of Fall and Fall-Related Injury  Outcome: Ongoing, Progressing     Problem: Skin Injury Risk Increased  Goal: Skin Health and Integrity  Outcome: Ongoing, Progressing     Problem: Glycemic Control Impaired (Sepsis/Septic Shock)  Goal: Blood Glucose Level Within Desired Range  Outcome: Ongoing, Progressing

## 2023-08-26 NOTE — ASSESSMENT & PLAN NOTE
Fluctuating mentation since 8/22, at times forgetful and slow to respond, AAOx3 but not aware of situation, ammonia WNL, calcium normal, CT head w/ contrast without evidence of mets or abnormality. Improved on 8/26.

## 2023-08-27 PROBLEM — R11.0 NAUSEA: Status: ACTIVE | Noted: 2023-08-27

## 2023-08-27 PROBLEM — K83.09 CHOLANGITIS: Status: ACTIVE | Noted: 2023-08-27

## 2023-08-27 LAB
ALBUMIN SERPL BCP-MCNC: 1.2 G/DL (ref 3.5–5.2)
ALP SERPL-CCNC: 564 U/L (ref 55–135)
ALT SERPL W/O P-5'-P-CCNC: 49 U/L (ref 10–44)
ANION GAP SERPL CALC-SCNC: 8 MMOL/L (ref 8–16)
APTT PPP: 43.4 SEC (ref 21–32)
AST SERPL-CCNC: 78 U/L (ref 10–40)
BASOPHILS # BLD AUTO: 0.03 K/UL (ref 0–0.2)
BASOPHILS NFR BLD: 0.2 % (ref 0–1.9)
BILIRUB SERPL-MCNC: 2.8 MG/DL (ref 0.1–1)
BUN SERPL-MCNC: 18 MG/DL (ref 8–23)
CALCIUM SERPL-MCNC: 7.8 MG/DL (ref 8.7–10.5)
CHLORIDE SERPL-SCNC: 109 MMOL/L (ref 95–110)
CO2 SERPL-SCNC: 20 MMOL/L (ref 23–29)
CREAT SERPL-MCNC: 1.1 MG/DL (ref 0.5–1.4)
DIFFERENTIAL METHOD: ABNORMAL
EOSINOPHIL # BLD AUTO: 0.1 K/UL (ref 0–0.5)
EOSINOPHIL NFR BLD: 0.7 % (ref 0–8)
ERYTHROCYTE [DISTWIDTH] IN BLOOD BY AUTOMATED COUNT: 19 % (ref 11.5–14.5)
EST. GFR  (NO RACE VARIABLE): 56.8 ML/MIN/1.73 M^2
GLUCOSE SERPL-MCNC: 103 MG/DL (ref 70–110)
HCT VFR BLD AUTO: 25.8 % (ref 37–48.5)
HGB BLD-MCNC: 8.2 G/DL (ref 12–16)
IMM GRANULOCYTES # BLD AUTO: 0.05 K/UL (ref 0–0.04)
IMM GRANULOCYTES NFR BLD AUTO: 0.4 % (ref 0–0.5)
INR PPP: 1.6 (ref 0.8–1.2)
LYMPHOCYTES # BLD AUTO: 1.1 K/UL (ref 1–4.8)
LYMPHOCYTES NFR BLD: 8.8 % (ref 18–48)
MAGNESIUM SERPL-MCNC: 2.2 MG/DL (ref 1.6–2.6)
MCH RBC QN AUTO: 26.6 PG (ref 27–31)
MCHC RBC AUTO-ENTMCNC: 31.8 G/DL (ref 32–36)
MCV RBC AUTO: 84 FL (ref 82–98)
MONOCYTES # BLD AUTO: 0.9 K/UL (ref 0.3–1)
MONOCYTES NFR BLD: 7.5 % (ref 4–15)
NEUTROPHILS # BLD AUTO: 9.9 K/UL (ref 1.8–7.7)
NEUTROPHILS NFR BLD: 82.4 % (ref 38–73)
NRBC BLD-RTO: 0 /100 WBC
PLATELET # BLD AUTO: 204 K/UL (ref 150–450)
PMV BLD AUTO: 12.5 FL (ref 9.2–12.9)
POCT GLUCOSE: 106 MG/DL (ref 70–110)
POCT GLUCOSE: 110 MG/DL (ref 70–110)
POCT GLUCOSE: 110 MG/DL (ref 70–110)
POCT GLUCOSE: 202 MG/DL (ref 70–110)
POCT GLUCOSE: 90 MG/DL (ref 70–110)
POTASSIUM SERPL-SCNC: 3.7 MMOL/L (ref 3.5–5.1)
PROT SERPL-MCNC: 6.1 G/DL (ref 6–8.4)
PROTHROMBIN TIME: 16.3 SEC (ref 9–12.5)
RBC # BLD AUTO: 3.08 M/UL (ref 4–5.4)
SODIUM SERPL-SCNC: 137 MMOL/L (ref 136–145)
VANCOMYCIN SERPL-MCNC: 18.2 UG/ML
WBC # BLD AUTO: 12.06 K/UL (ref 3.9–12.7)

## 2023-08-27 PROCEDURE — 97530 THERAPEUTIC ACTIVITIES: CPT

## 2023-08-27 PROCEDURE — 85610 PROTHROMBIN TIME: CPT | Performed by: STUDENT IN AN ORGANIZED HEALTH CARE EDUCATION/TRAINING PROGRAM

## 2023-08-27 PROCEDURE — 25000003 PHARM REV CODE 250: Performed by: STUDENT IN AN ORGANIZED HEALTH CARE EDUCATION/TRAINING PROGRAM

## 2023-08-27 PROCEDURE — 83735 ASSAY OF MAGNESIUM: CPT | Performed by: STUDENT IN AN ORGANIZED HEALTH CARE EDUCATION/TRAINING PROGRAM

## 2023-08-27 PROCEDURE — 25000003 PHARM REV CODE 250: Performed by: INTERNAL MEDICINE

## 2023-08-27 PROCEDURE — 85730 THROMBOPLASTIN TIME PARTIAL: CPT | Performed by: INTERNAL MEDICINE

## 2023-08-27 PROCEDURE — 99233 PR SUBSEQUENT HOSPITAL CARE,LEVL III: ICD-10-PCS | Mod: ,,, | Performed by: STUDENT IN AN ORGANIZED HEALTH CARE EDUCATION/TRAINING PROGRAM

## 2023-08-27 PROCEDURE — 85025 COMPLETE CBC W/AUTO DIFF WBC: CPT | Performed by: STUDENT IN AN ORGANIZED HEALTH CARE EDUCATION/TRAINING PROGRAM

## 2023-08-27 PROCEDURE — 51798 US URINE CAPACITY MEASURE: CPT

## 2023-08-27 PROCEDURE — 63600175 PHARM REV CODE 636 W HCPCS: Performed by: EMERGENCY MEDICINE

## 2023-08-27 PROCEDURE — 99900035 HC TECH TIME PER 15 MIN (STAT)

## 2023-08-27 PROCEDURE — 20600001 HC STEP DOWN PRIVATE ROOM

## 2023-08-27 PROCEDURE — 99233 SBSQ HOSP IP/OBS HIGH 50: CPT | Mod: ,,, | Performed by: STUDENT IN AN ORGANIZED HEALTH CARE EDUCATION/TRAINING PROGRAM

## 2023-08-27 PROCEDURE — 97164 PT RE-EVAL EST PLAN CARE: CPT

## 2023-08-27 PROCEDURE — 51702 INSERT TEMP BLADDER CATH: CPT

## 2023-08-27 PROCEDURE — 94761 N-INVAS EAR/PLS OXIMETRY MLT: CPT

## 2023-08-27 PROCEDURE — 63600175 PHARM REV CODE 636 W HCPCS

## 2023-08-27 PROCEDURE — 63600175 PHARM REV CODE 636 W HCPCS: Performed by: STUDENT IN AN ORGANIZED HEALTH CARE EDUCATION/TRAINING PROGRAM

## 2023-08-27 PROCEDURE — 36415 COLL VENOUS BLD VENIPUNCTURE: CPT | Performed by: STUDENT IN AN ORGANIZED HEALTH CARE EDUCATION/TRAINING PROGRAM

## 2023-08-27 PROCEDURE — 94660 CPAP INITIATION&MGMT: CPT

## 2023-08-27 PROCEDURE — 80202 ASSAY OF VANCOMYCIN: CPT | Performed by: STUDENT IN AN ORGANIZED HEALTH CARE EDUCATION/TRAINING PROGRAM

## 2023-08-27 PROCEDURE — 80053 COMPREHEN METABOLIC PANEL: CPT

## 2023-08-27 RX ORDER — POLYETHYLENE GLYCOL 3350 17 G/17G
17 POWDER, FOR SOLUTION ORAL DAILY
Status: DISCONTINUED | OUTPATIENT
Start: 2023-08-27 | End: 2023-09-01 | Stop reason: HOSPADM

## 2023-08-27 RX ORDER — SENNOSIDES 8.6 MG/1
8.6 TABLET ORAL DAILY
Status: DISCONTINUED | OUTPATIENT
Start: 2023-08-27 | End: 2023-09-01 | Stop reason: HOSPADM

## 2023-08-27 RX ADMIN — METRONIDAZOLE 500 MG: 500 INJECTION, SOLUTION INTRAVENOUS at 04:08

## 2023-08-27 RX ADMIN — VANCOMYCIN HYDROCHLORIDE 750 MG: 750 INJECTION, POWDER, LYOPHILIZED, FOR SOLUTION INTRAVENOUS at 10:08

## 2023-08-27 RX ADMIN — ATORVASTATIN CALCIUM 40 MG: 40 TABLET, FILM COATED ORAL at 12:08

## 2023-08-27 RX ADMIN — ASPIRIN 81 MG: 81 TABLET, COATED ORAL at 08:08

## 2023-08-27 RX ADMIN — METRONIDAZOLE 500 MG: 500 INJECTION, SOLUTION INTRAVENOUS at 06:08

## 2023-08-27 RX ADMIN — PROMETHAZINE HYDROCHLORIDE 12.5 MG: 25 INJECTION, SOLUTION INTRAMUSCULAR; INTRAVENOUS at 11:08

## 2023-08-27 RX ADMIN — PROCHLORPERAZINE MALEATE 5 MG: 5 TABLET ORAL at 04:08

## 2023-08-27 RX ADMIN — CLOPIDOGREL BISULFATE 75 MG: 75 TABLET ORAL at 08:08

## 2023-08-27 RX ADMIN — ONDANSETRON 4 MG: 2 INJECTION INTRAMUSCULAR; INTRAVENOUS at 04:08

## 2023-08-27 RX ADMIN — CIPROFLOXACIN 400 MG: 2 INJECTION, SOLUTION INTRAVENOUS at 11:08

## 2023-08-27 RX ADMIN — ENOXAPARIN SODIUM 40 MG: 40 INJECTION SUBCUTANEOUS at 05:08

## 2023-08-27 RX ADMIN — ATORVASTATIN CALCIUM 40 MG: 40 TABLET, FILM COATED ORAL at 08:08

## 2023-08-27 RX ADMIN — CIPROFLOXACIN 400 MG: 2 INJECTION, SOLUTION INTRAVENOUS at 12:08

## 2023-08-27 RX ADMIN — SODIUM CHLORIDE, POTASSIUM CHLORIDE, SODIUM LACTATE AND CALCIUM CHLORIDE 250 ML: 600; 310; 30; 20 INJECTION, SOLUTION INTRAVENOUS at 01:08

## 2023-08-27 RX ADMIN — POLYETHYLENE GLYCOL 3350 17 G: 17 POWDER, FOR SOLUTION ORAL at 01:08

## 2023-08-27 RX ADMIN — SENNOSIDES 8.6 MG: 8.6 TABLET, FILM COATED ORAL at 01:08

## 2023-08-27 RX ADMIN — INSULIN ASPART 4 UNITS: 100 INJECTION, SOLUTION INTRAVENOUS; SUBCUTANEOUS at 12:08

## 2023-08-27 RX ADMIN — ESCITALOPRAM OXALATE 10 MG: 10 TABLET ORAL at 08:08

## 2023-08-27 RX ADMIN — PROCHLORPERAZINE MALEATE 5 MG: 5 TABLET ORAL at 06:08

## 2023-08-27 RX ADMIN — METRONIDAZOLE 500 MG: 500 INJECTION, SOLUTION INTRAVENOUS at 11:08

## 2023-08-27 NOTE — ASSESSMENT & PLAN NOTE
Resolved  Patient with acute kidney injury/acute renal failure likely due to pre-renal azotemia due to dehydration PAPA is currently improving. Baseline creatinine 0.9 - Labs reviewed- Renal function/electrolytes with Estimated Creatinine Clearance: 45.8 mL/min (based on SCr of 1.1 mg/dL). according to latest data. Monitor urine output and serial BMP and adjust therapy as needed. Avoid nephrotoxins and renally dose meds for GFR listed above.  - Improved with IVF

## 2023-08-27 NOTE — PROGRESS NOTES
[facility] ([address] Medicine  Progress Note    Patient Name: Mariann Huff  MRN: [id number]  Patient Class: IP- Inpatient   Admission Date: 8/14/2023  Length of Stay: 13 days  Attending Physician: Baudilio Lozada MD  Primary Care Provider: Jasbir Haney MD        Subjective:     Principal Problem:Shock        HPI:  63 y.o. female with CAD s/p GINGER to ostial LAD in 2014, HTN, HLD, DM2, MURTAZA who presented to the ED to the ER after a near syncopal event at home around 4 pm. She also feels tired and her appetite lately has decreased. she had chest pain and visceral symptoms with nausea and vomiting. She reports losing weight as well.She has had abdominal symptoms with poor PO intake leading to orthostasis. Today she had another near syncopal event and is why she was brought to the ER. Here her ECG showed the posterior changes for which posterior EKG was obtained with showed STEMI. Bedside echo showed an EF 45-50% and norma-lateral and infero-lateral hypokinesis. She was taken to cath lab and her symptoms resolved after PCI. Please refer to PCI for full details.       Overview/Hospital Course:  Patient was taken to cath lab and her symptoms resolved after PCI. Patient chest pain resolved.     Patient reported development of acute abdominal pain associated with nausea and vomiting. Patient was recently admitted (6/23) for suspected biliary pancreatitis and biliary stricture treated with biliary sphincterotomy, biliary stent, and cholecystectomy.    Patient now has worsening transaminitis w/ elevated T-Bili.   Concern for stent occlusion. Patient amylase 107 wnl, and Lipase 61. Workup will include total abdominal ultrasound and [facility] was consulted . Was scheduled for outpatient w/u of hepatic lesions found on previous admission 8/2/23.  Patient had ERCP and EUS on 8/17/23. Visualized a partial occluded stent in the biliary tree. One covered metal stent was placed into  the common bile duct. Concern for metastatic cancer is high due to multiple liver lesions and mass on pancreatic head per [facility]. Initially on previous hospital admission, it was believed to more likely be liver abscesses rather than malignancy due to acute nature of progression. S/p FNA biopsy with ERCP and stent placement on 8/17.     Became hypoglycemic on 8/19 and Hgb dropped from 8 to 6.3 with a reported dark stool, Improved to 7.1 after 1 unit PRBC, could not get a second unit due to respiratory distress, no further episodes of melena. Seen by GI, did not recommend endoscopy at this time for possible GIB.  Given lasix 20mg IV then 40mg IV with good output and improvement in respiratory status.    T bili continued to rise on 8/21, KUB did not show stent migration, patient underwent ERCP again on 8/22 which demonstrated a visibly ocluded stent in the biliary tree with a single biliary stricture in main BD. One covered metal stent placed in CBD. Several hours following this procedure, patient developed rapid onset fever to 103 and rapid drop in BP from SBP 180s to SBP 80s. Patient admitted to MICU for further management. She was continued on vanc/cefepime, briefly on levophed and then weaned off. Still with intermittent AMS and elevateed WBC, febrile 8/25. Adjusted abx to vanc/cipro/flagyl with improvement in mentation. Oral intake still poor, PAPA with Cr 1.4 on 8/26, improved with IVF. Still nauseous      Interval History: Retaining urine overnight, correa placed, still nauseous    Review of Systems   Constitutional:  Positive for fatigue.   Respiratory:  Negative for shortness of breath.    Cardiovascular:  Negative for chest pain.   Gastrointestinal:  Negative for abdominal pain.   Genitourinary:  Negative for dysuria and frequency.   Neurological:  Negative for dizziness and headaches.     Objective:     Vital Signs (Most Recent):  Temp: 98.5 °F (36.9 °C) (08/27/23 0730)  Pulse: 79 (08/27/23 0730)  Resp: 18  (08/27/23 0730)  BP: 113/67 (08/27/23 0730)  SpO2: 97 % (08/27/23 0730) Vital Signs (24h Range):  Temp:  [98.2 °F (36.8 °C)-99.3 °F (37.4 °C)] 98.5 °F (36.9 °C)  Pulse:  [79-99] 79  Resp:  [14-26] 18  SpO2:  [97 %-100 %] 97 %  BP: (113-141)/(58-72) 113/67     Weight: 60.8 kg (134 lb 0.6 oz)  Body mass index is 23.01 kg/m².    Intake/Output Summary (Last 24 hours) at 8/27/2023 1052  Last data filed at 8/27/2023 0745  Gross per 24 hour   Intake 1217.84 ml   Output 1400 ml   Net -182.16 ml           Physical Exam  Constitutional:       Appearance: She is ill-appearing.   HENT:      Head: Normocephalic and atraumatic.   Cardiovascular:      Rate and Rhythm: Normal rate and regular rhythm.      Pulses: Normal pulses.      Heart sounds: Normal heart sounds.   Pulmonary:      Effort: Pulmonary effort is normal.      Comments: Crackles in RLL  Abdominal:      General: Abdomen is flat.      Tenderness: There is abdominal tenderness.      Comments: TTP in mid abdomen to deep palpation   Musculoskeletal:      Right lower leg: No edema.      Left lower leg: No edema.   Skin:     General: Skin is warm.      Capillary Refill: Capillary refill takes less than 2 seconds.      Coloration: Skin is not jaundiced or pale.   Neurological:      General: No focal deficit present.      Mental Status: She is alert and oriented to person, place, and time.   Psychiatric:         Mood and Affect: Mood normal.             Significant Labs: All pertinent labs within the past 24 hours have been reviewed.  BMP:   Recent Labs   Lab 08/27/23  0552         K 3.7      CO2 20*   BUN 18   CREATININE 1.1   CALCIUM 7.8*   MG 2.2       CBC:   Recent Labs   Lab 08/26/23  0431 08/27/23  0850   WBC 13.08* 12.06   HGB 8.4* 8.2*   HCT 27.0* 25.8*    204       CMP:   Recent Labs   Lab 08/26/23  0431 08/26/23  1653 08/27/23  0552    136 137   K 3.5 4.0 3.7    107 109   CO2 22* 20* 20*   GLU 99 137* 103   BUN 18 18 18  "  CREATININE 1.4 1.2 1.1   CALCIUM 8.6* 8.0* 7.8*   PROT 6.8  --  6.1   ALBUMIN 1.3*  --  1.2*   BILITOT 4.0*  --  2.8*   ALKPHOS 678*  --  564*   *  --  78*   ALT 72*  --  49*   ANIONGAP 11 9 8       Lactic Acid:   Recent Labs   Lab 08/25/23  1604   LACTATE 1.4       Troponin: No results for input(s): "TROPONINI", "TROPONINIHS" in the last 48 hours.    Urine Culture: No results for input(s): "LABURIN" in the last 48 hours.  Urine Studies: No results for input(s): "COLORU", "APPEARANCEUA", "PHUR", "SPECGRAV", "PROTEINUA", "GLUCUA", "KETONESU", "BILIRUBINUA", "OCCULTUA", "NITRITE", "UROBILINOGEN", "LEUKOCYTESUR", "RBCUA", "WBCUA", "BACTERIA", "SQUAMEPITHEL", "HYALINECASTS" in the last 48 hours.    Invalid input(s): "WRIGHTSUR"    Significant Imaging: I have reviewed all pertinent imaging results/findings within the past 24 hours.      Assessment/Plan:      * Shock  Presumed Cholangitis  Developed septic shock on 8/22 after ERCP, some notes indicate pus seen during stent exchange, she then became hypotensive and required MICU admission for levophed. She had a troponin elevation, thought to be demand ischemia from hypotension in setting of recent STEMI. She was on vanc/cefepime and transferred to  on 8/25, she was noted to have new spastic movements, cefipime changed to cipro/flagyl due to high vanc trough to avoid nephrotoxicity with zosyn. WBC improving, still nauseous  - Vanc/cefepime/flagyl - plan to continue until at least 8/30 per GI recs  - Follow up bcx, NGTD, last done 8/25 for fever  - Continued nausea  -       Nausea  Difficult to control and leading to poor PO intake, has not had BM in several days, will put in a bowel regimen, and continue to treat cholangitis.      Cholangitis        Pancreatic cancer  Diagnosed this admission, will need follow up with oncology.       Atrial fibrillation  Patient with Paroxysmal (<7 days) atrial fibrillation which is controlled currently with Beta Blocker. " Patient is currently in sinus rhythm.[id number] Score: 3. . Anticoagulation not indicated due to possible bleeding, was previously on lovenox but it is being held.    Biliary obstruction  Patient reports development of acute abdominal pain associated with nausea and vomiting. Patient was recently admitted (6/23) for suspected biliary pancreatitis and biliary stricture treated with biliary sphincterotomy, biliary stent, and cholecystectomy s/p ERCP w/ metal stent on 8/17, abdominal pain has resolved but T bili is still rising     - Trend T bili daily  -[facility] consulted, s/p ERCP w/ biopsy on 8/17, transaminases and alk phos improving but bili is worsening  -S/p repeat ERCP 8/22 w/ septic shock from presume cholangitis, see septic shock above  - LFTs improving       CHF (congestive heart failure)  -On week prior to admission had CXR with B/L edema, , EF decreased to 40-45%  -Got IV lasix while inpatient and was discharged on 40 po daily however became dehydrated and weak and LVEDP -in the lab showed LVEDP 9, reduce dose to 20 po  - Holding all GDMT in setting of recent shock, will resume once infection is better controlled  - Holding lasix for now    TTE 8/15/23    Left Ventricle: The left ventricle is normal in size. Ventricular mass is normal. Normal wall thickness. regional wall motion abnormalities present. See diagram for wall motion findings. There is moderately reduced systolic function. Ejection fraction by visual approximation is 40%. Grade II diastolic dysfunction.    Left Atrium: Left atrium is mildly dilated. The left atrium volume index is 35.5 mL/m2.    Right Ventricle: Normal right ventricular cavity size. Wall thickness is normal. Right ventricle wall motion  is normal. Systolic function is normal.    Mitral Valve: There is mild regurgitation.    IVC/SVC: Normal venous pressure at 3 mmHg.    Liver parenchyma is inhomgenous.  Clinical correlation is required.      STEMI (ST elevation  myocardial infarction)  -Started having near syncope around 4 pm on the day of admission  -Bedside echo in the ER showed an EF 45-50% and norma-lateral and infero-lateral hypokinesis  -She underwent Successful primary PCI of ramus intermedius and lateral branch with Pronto thrombectomy and 3X16 GINGER     Plan  - Continue aspirin 81 mg daily, stop after 1 month to avoid triple therapy  - Loaded with brilinta 180, loaded with plavix 600 8/15/23, and 75 qd there after  - Continue high intensity statin therapy (LDL goal < 70)  - TTE shows ejection fraction of about 40% associated with grade II diastolic dysfunction.  - Hold lasix for now  - Continue ASA/plavix  - Developed troponin elevation in setting of septic shock as well, seen by cardiology, TTE unchanged, no need for repeat LHC      Other specified anemias  Had notable hemoglobin drop, c/f GIB, however stools turned brown by time of GI evaluation, held lovenox for afib, still holding lovenox, may resume if hgb continues to be stable. Received 2 units PRBC this admission  - Daily CBC  - Hgb goal >8 due to recent STEMI  - Hold lovenox for now      NSTEMI (non-ST elevated myocardial infarction)  During septic shock, see STEMI above for further discussion      History of pancreatitis  Recent (6/2023) suspected biliary pancreatitis and biliary stricture treated with biliary sphincterotomy, biliary stent, and cholecystectomy.  CT A/P with new innumerable hepatic lesions:  1. Innumerable low attenuating hepatic lesions, new since the previous exam.  Malignancy remains a concern however given short-term development, correlation is needed to exclude multifocal infection/abscess in this patient status post bile duct stent placement and cholecystectomy.  Please note, there is a low attenuating focus within the gallbladder fossa, similar to the foci throughout the hepatic parenchyma..  2. Pancreatic ductal dilatation up to 4 mm, minimally increased compared to prior.  There is  fullness of the pancreatic head, underlying mass is not excluded, correlation with recent ERCP advised.  3. Simple and complex bilateral renal cysts.  4. Biliary stent in place with expected pneumobilia.  5. Cholecystectomy with scattered fluid about the gallbladder fossa.  6. Moderate stool throughout the colon, may reflect developing constipation.  7.  Additional findings as above.     - concern for lesions seen on CT A/P, underwent biopsy, now confirm pancreatic CA  - Patient was found to have a hypoechoic pancreatic mass on abdominal US  - Was also found to have this mass on EUS concerning for malignancy   - EUS and ERCP 8/17/23 and then again on 8/22    Altered mental status  Fluctuating mentation since 8/22, at times forgetful and slow to respond, AAOx3 but not aware of situation, ammonia WNL, calcium normal, CT head w/ contrast without evidence of mets or abnormality. Improved on 8/26.      Type 2 diabetes mellitus with stage 2 chronic kidney disease and hypertension  Home Antihyperglycemic Regiment:  -Farxiga, glipizide, insulin daily at home  - Titrate and addition of insulin as needed for BG goal 140-180  - SSI with POCT accuchecks ACHS and Diabetic diet 2000 beti  - Diabetic nutritional counseling given   - Continue home gabapentin for diabetic peripheral neuropathy  - Appreciate Endocrinology recs  - Adjust insulin and monitor closely  - Endocrine following    PAPA (acute kidney injury)  Resolved  Patient with acute kidney injury/acute renal failure likely due to pre-renal azotemia due to dehydration PAPA is currently improving. Baseline creatinine 0.9 - Labs reviewed- Renal function/electrolytes with Estimated Creatinine Clearance: 45.8 mL/min (based on SCr of 1.1 mg/dL). according to latest data. Monitor urine output and serial BMP and adjust therapy as needed. Avoid nephrotoxins and renally dose meds for GFR listed above.  - Improved with IVF      VTE Risk Mitigation (From admission, onward)          Ordered     enoxaparin injection 40 mg  Every 24 hours         08/23/23 1026                Discharge Planning   REBECCA: 8/28/2023     Code Status: Full Code   Is the patient medically ready for discharge?: No    Reason for patient still in hospital (select all that apply): Patient trending condition and Treatment  Discharge Plan A: Home Health   Discharge Delays: None known at this time              Baudilio Lozada MD  Department of Hospital Medicine   [facility] - Intensive Care ([place])

## 2023-08-27 NOTE — PROGRESS NOTES
Pharmacokinetic Assessment Follow Up: IV Vancomycin    Vancomycin serum concentration assessment(s):    The random level was drawn correctly and can be used to guide therapy at this time. The measurement is within the desired definitive target range of 15 to 20 mcg/mL.    Vancomycin Regimen Plan:    Renal function is improving.  Give Vancomycin 750 mg IV x1 dose today.  Re-dose when the random level is less than 20 mcg/mL, next level to be drawn at 05:00 on 8/28/23    Drug levels (last 3 results):  Recent Labs   Lab Result Units 08/25/23  0659 08/26/23  0431 08/27/23  0552   Vancomycin, Random ug/mL  --  19.6 18.2   Vancomycin-Trough ug/mL 36.0*  --   --        Pharmacy will continue to follow and monitor vancomycin.    Please contact pharmacy at extension [phone] for questions regarding this assessment.    Thank you for the consult,   Vipul Cabral       Patient brief summary:  Mariann Huff is a 63 y.o. female initiated on antimicrobial therapy with IV Vancomycin for treatment of sepsis    The patient's current regimen is pulse dosing    Drug Allergies:   Review of patient's allergies indicates:   Allergen Reactions    Milk containing products (dairy)      Causes GI distress       Actual Body Weight:   60.8 kg    Renal Function:   Estimated Creatinine Clearance: 45.8 mL/min (based on SCr of 1.1 mg/dL).,     Dialysis Method (if applicable):  N/A    CBC (last 72 hours):  Recent Labs   Lab Result Units 08/25/23  0659 08/26/23  0431   WBC K/uL 13.83* 13.08*   Hemoglobin g/dL 8.6* 8.4*   Hematocrit % 27.3* 27.0*   Platelets K/uL 241 227   Gran % % 88.5* 85.5*   Lymph % % 5.9* 7.0*   Mono % % 3.6* 5.7   Eosinophil % % 1.3 0.8   Basophil % % 0.1 0.2   Differential Method  Automated Automated       Metabolic Panel (last 72 hours):  Recent Labs   Lab Result Units 08/25/23  0659 08/26/23  0431 08/26/23  1518 08/26/23  1653 08/27/23  0552   Sodium mmol/L 136 138  --  136 137   Sodium, Urine mmol/L  --   --  19*  --   --     Potassium mmol/L 3.7 3.5  --  4.0 3.7   Chloride mmol/L 105 105  --  107 109   CO2 mmol/L 22* 22*  --  20* 20*   Glucose mg/dL 145* 99  --  137* 103   BUN mg/dL 16 18  --  18 18   Creatinine mg/dL 1.1 1.4  --  1.2 1.1   Creatinine, Urine mg/dL  --   --  92.0  --   --    Albumin g/dL 1.3* 1.3*  --   --  1.2*   Total Bilirubin mg/dL 4.3* 4.0*  --   --  2.8*   Alkaline Phosphatase U/L 689* 678*  --   --  564*   AST U/L 196* 134*  --   --  78*   ALT U/L 86* 72*  --   --  49*   Magnesium mg/dL 2.3 2.4  --   --  2.2       Vancomycin Administrations:  vancomycin given in the last 96 hours                     vancomycin 750 mg in dextrose 5 % (D5W) 250 mL IVPB (Vial-Mate) (mg) 750 mg New Bag 08/26/23 0932    vancomycin (VANCOCIN) 1,000 mg in dextrose 5 % (D5W) 250 mL IVPB (Vial-Mate) (mg) 1,000 mg New Bag 08/25/23 0430      Restarted 08/24/23 0632      Restarted  0611     1,000 mg New Bag  0600                    Microbiologic Results:  Microbiology Results (last 7 days)       Procedure Component Value Units Date/Time    Blood culture [[id number]] Collected: 08/23/23 0046    Order Status: Completed Specimen: Blood Updated: 08/27/23 0612     Blood Culture, Routine No Growth to date      No Growth to date      No Growth to date      No Growth to date      No Growth to date    Narrative:      Collection has been rescheduled by AN at 08/22/2023 23:52 Reason:   Pt in ct  Collection has been rescheduled by AN at 08/22/2023 23:52 Reason:   Pt in ct    Blood culture [[id number]] Collected: 08/23/23 0046    Order Status: Completed Specimen: Blood Updated: 08/27/23 0612     Blood Culture, Routine No Growth to date      No Growth to date      No Growth to date      No Growth to date      No Growth to date    Narrative:      Collection has been rescheduled by AN at 08/22/2023 23:52 Reason:   Pt in ct  Collection has been rescheduled by AN at 08/22/2023 23:52 Reason:   Pt in ct    Blood culture [[id number]] Collected: 08/25/23  1202    Order Status: Completed Specimen: Blood Updated: 08/26/23 1412     Blood Culture, Routine No Growth to date      No Growth to date    Blood culture [[id number]] Collected: 08/25/23 1206    Order Status: Completed Specimen: Blood Updated: 08/26/23 1412     Blood Culture, Routine No Growth to date      No Growth to date    Stool culture [[id number]] Collected: 08/24/23 1308    Order Status: Completed Specimen: Stool Updated: 08/26/23 1254     Stool Culture No Salmonella,Shigella,Vibrio,Campylobacter,Yersinia isolated.    E. coli 0157 antigen [[id number]] Collected: 08/24/23 1308    Order Status: Completed Specimen: Stool Updated: 08/25/23 1112     Shiga Toxin 1 E.coli Negative     Shiga Toxin 2 E.coli Negative    Clostridium difficile EIA [[id number]] Collected: 08/24/23 1308    Order Status: Completed Specimen: Stool Updated: 08/24/23 2225     C. diff Antigen Negative     C difficile Toxins A+B, EIA Negative     Comment: Testing not recommended for children <24 months old.       Urine culture [[id number]] Collected: 08/23/23 0235    Order Status: Completed Specimen: Urine Updated: 08/24/23 1358     Urine Culture, Routine No significant growth    Narrative:      Specimen Source->Urine    Blood culture [[id number]]     Order Status: Canceled Specimen: Blood

## 2023-08-27 NOTE — PLAN OF CARE
Problem: Physical Therapy  Goal: Physical Therapy Goal  Description: Goals to be met by: 23     Patient will increase functional independence with mobility by performin. Supine to sit with [place]  2. Sit to stand transfer with Supervision  3. Bed to chair transfer with Supervision using LRAD  4. Gait  x 75 feet with Stand-by Assistance using LRAD  5. Ascend/Descend 6 inch curb step with Stand-by Assistance with/without LRAD  6. Lower extremity exercise program x30 reps per handout, with independence    Outcome: Ongoing, Progressing     Re-eval . Goals remain appropriate

## 2023-08-27 NOTE — PLAN OF CARE
Problem: Diabetes Comorbidity  Goal: Blood Glucose Level Within Targeted Range  Outcome: Ongoing, Progressing     Problem: Fluid and Electrolyte Imbalance (Acute Kidney Injury/Impairment)  Goal: Fluid and Electrolyte Balance  Outcome: Ongoing, Progressing     Problem: Renal Function Impairment (Acute Kidney Injury/Impairment)  Goal: Effective Renal Function  Outcome: Ongoing, Progressing     Problem: Adult Inpatient Plan of Care  Goal: Plan of Care Review  Outcome: Ongoing, Progressing  Goal: Absence of Hospital-Acquired Illness or Injury  Outcome: Ongoing, Progressing     Problem: Fall Injury Risk  Goal: Absence of Fall and Fall-Related Injury  Outcome: Ongoing, Progressing     Problem: Skin Injury Risk Increased  Goal: Skin Health and Integrity  Outcome: Ongoing, Progressing     Problem: Glycemic Control Impaired (Sepsis/Septic Shock)  Goal: Blood Glucose Level Within Desired Range  Outcome: Ongoing, Progressing

## 2023-08-27 NOTE — PT/OT/SLP RE-EVAL
"Physical Therapy Re-evaluation    Patient Name:  Mariann Huff   MRN:  [id number]    Recommendations:     Discharge Recommendations: nursing facility, skilled  Discharge Equipment Recommendations: walker, rolling   Barriers to discharge: None    Assessment:     Mariann Huff is a 63 y.o. female admitted with a medical diagnosis of Shock.  She presents with the following impairments/functional limitations: weakness, impaired endurance, impaired functional mobility, gait instability, impaired self care skills, impaired cardiopulmonary response to activity. Patient presents for re-evaluation 08/27; currently she requires assistance with bed mobility, transfers, and displays some balance deficits with ambulation. Continue to recommend discharge to SNF; may progress to  PT.     Rehab Prognosis:  good; patient would benefit from acute skilled PT services to address these deficits and reach maximum level of function.      Recent Surgery: Procedure(s) (LRB):  ERCP (ENDOSCOPIC RETROGRADE CHOLANGIOPANCREATOGRAPHY) (N/A) 5 Days Post-Op    Plan:     During this hospitalization, patient to be seen 3 x/week to address the above listed problems via gait training, therapeutic exercises, therapeutic activities, neuromuscular re-education, wheelchair management/training  Plan of Care Expires:  09/14/23  Plan of Care Reviewed with: patient, spouse    Subjective     Communicated with RN prior to session.  Patient found HOB elevated with peripheral IV, correa catheter, telemetry, pulse ox (continuous), blood pressure cuff upon PT entry to room, agreeable to evaluation. Family visiting.     Chief Complaint: dizziness/nausea with transfers  Patient comments/goals: "I need to use the commode"   Pain/Comfort:  Pain Rating 1: 0/10  Pain Rating Post-Intervention 1: 0/10    Patients cultural, spiritual, Holiness conflicts given the current situation: no      Objective:     Patient found with: peripheral IV, correa catheter, telemetry, " pulse ox (continuous), blood pressure cuff     General Precautions: Standard, fall  Orthopedic Precautions: N/A  Braces: N/A  Respiratory Status: Room air    Exams:  Cognitive Exam:  Patient is oriented to Person, Place, Time, and Situation  Sensation:    -       Intact  RLE ROM: WFL  RLE Strength: WFL  LLE ROM: WFL  LLE Strength: WFL    Functional Mobility:  Bed Mobility:     Rolling Right: minimum assistance  Scooting: moderate assistance  Supine to Sit: moderate assistance  Sit to Supine: moderate assistance  Transfers:     Sit to Stand:  moderate assistance with rolling walker  Bed to commode: moderate assist with HH assist for balance.   Gait: patient ambulated 7' with CGA with 2ww. CGA for balance. Decreased step length, height, gait speed. Cues for walker safety.    AM-PAC 6 CLICK MOBILITY  Total Score:17       Treatment and Education:   Education: patient educated on POC, need for therapy, safety with mobility, discharge recommendations and equipment recommendations. Patient verbalized understanding of all topics.      Patient left HOB elevated with all lines intact, call button in reach, bed alarm on, and RN present.    GOALS:   Multidisciplinary Problems       Physical Therapy Goals          Problem: Physical Therapy    Goal Priority Disciplines Outcome Goal Variances Interventions   Physical Therapy Goal     PT, PT/OT Ongoing, Progressing     Description: Goals to be met by: 23     Patient will increase functional independence with mobility by performin. Supine to sit with [place]  2. Sit to stand transfer with Supervision  3. Bed to chair transfer with Supervision using LRAD  4. Gait  x 75 feet with Stand-by Assistance using LRAD  5. Ascend/Descend 6 inch curb step with Stand-by Assistance with/without LRAD  6. Lower extremity exercise program x30 reps per handout, with independence                         History:     Past Medical History:   Diagnosis Date    Anticoagulant long-term use      Asthma     Back pain     Bradycardia, unspecified 05/08/2019    The etiology of the bradycardic episode is unclear.  The have appear to be respiratory in origin (at least the 1st episode).  We will continue to monitor carefully.  We are awaiting evaluation by Cardiology.      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, with long-term current use of insulin 01/09/2020       Past Surgical History:   Procedure Laterality Date    ANGIOGRAM, CORONARY, WITH LEFT HEART CATHETERIZATION N/A 8/3/2023    Procedure: Angiogram, Coronary, with Left Heart Cath;  Surgeon: Aime George MD;  Location: [facility] CATH LAB;  Service: Cardiology;  Laterality: N/A;    ANGIOGRAM, CORONARY, WITH LEFT HEART CATHETERIZATION N/A 8/14/2023    Procedure: Angiogram, Coronary, with Left Heart Cath;  Surgeon: Wilman Kim MD;  Location: [facility] CATH LAB;  Service: Cardiology;  Laterality: N/A;    ANTEGRADE NEPHROSTOGRAPHY Left 12/11/2019    Procedure: Nephrostogram - antegrade;  Surgeon: Robin Boyd MD;  [address]  Service: Urology;  Laterality: Left;    BRONCHOSCOPY N/A 5/2/2019    Procedure: BRONCHOSCOPY;  Surgeon: Sean Ruano MD;  [facility];  Service: General;  Laterality: N/A;    CARDIAC CATHETERIZATION      CATARACT EXTRACTION      cataract extraction left eye      cataracts       CAUDAL EPIDURAL STEROID INJECTION N/A 2019    Procedure: Injection-steroid-epidural-caudal;  Surgeon: Dave Bentley Jr., MD;  Location: [facility] PAIN MGT;  Service: Pain Management;  Laterality: N/A;     SECTION, LOW TRANSVERSE      COLONOSCOPY N/A 2019    Procedure: COLONOSCOPY;  Surgeon: Al Alaniz MD;  Location: [facility] ENDO (2ND FLR);  Service: Endoscopy;  Laterality: N/A;    CORONARY ANGIOPLASTY      CYSTOGRAM N/A 2019    Procedure: CYSTOGRAM INTRAOP;  Surgeon: Robin Boyd MD;  Location: [facility] OR 2ND FLR;  Service: Urology;  Laterality: N/A;  1 HOUR    CYSTOSCOPY W/ RETROGRADES Left 2019    Procedure: CYSTOSCOPY, WITH RETROGRADE PYELOGRAM;  Surgeon: Robin Boyd MD;  Location: [facility] OR 2ND FLR;  Service: Urology;  Laterality: Left;  fluro    ENDOSCOPIC ULTRASOUND OF UPPER GASTROINTESTINAL TRACT N/A 6/15/2023    Procedure: ULTRASOUND, UPPER GI TRACT, ENDOSCOPIC;  Surgeon: Lisa Kim MD;  Location: [facility] ENDO (2ND FLR);  Service: Endoscopy;  Laterality: N/A;    ENDOSCOPIC ULTRASOUND OF UPPER GASTROINTESTINAL TRACT  2023    Procedure: ULTRASOUND, UPPER GI TRACT, ENDOSCOPIC;  Surgeon: Wayne Adamson MD;  Location: [facility] ENDO (2ND FLR);  Service: Endoscopy;;    ERCP N/A 6/15/2023    Procedure: ERCP (ENDOSCOPIC RETROGRADE CHOLANGIOPANCREATOGRAPHY);  Surgeon: Lisa Kim MD;  Location: [facility] ENDO (2ND FLR);  Service: Endoscopy;  Laterality: N/A;    ERCP N/A 2023    Procedure: ERCP (ENDOSCOPIC RETROGRADE CHOLANGIOPANCREATOGRAPHY);  Surgeon: Wayne Adamson MD;  Location: [facility] ENDO (2ND FLR);  Service: Endoscopy;  Laterality: N/A;  eliquis and pletal ok to hold-see te 23-dr [id number]-tr-[id number] portal    ERCP N/A 2023    Procedure: ERCP (ENDOSCOPIC RETROGRADE CHOLANGIOPANCREATOGRAPHY);  Surgeon: Wayne Adamson MD;  Location: [facility] ENDO (2ND FLR);  Service: Endoscopy;  Laterality: N/A;    ERCP N/A 2023    Procedure: ERCP (ENDOSCOPIC RETROGRADE  CHOLANGIOPANCREATOGRAPHY);  Surgeon: Christian Huff MD;  Location: [facility] ENDO (2ND FLR);  Service: Endoscopy;  Laterality: N/A;    ESOPHAGOGASTRODUODENOSCOPY W/ PEG  5/2/2019    Procedure: EGD, WITH PEG TUBE INSERTION;  Surgeon: Sean Ruano MD;  Location: [facility] OR 2ND FLR;  Service: General;;    EXCISION TURBINATE, SUBMUCOUS      FLEXIBLE SIGMOIDOSCOPY N/A 5/13/2019    Procedure: SIGMOIDOSCOPY, FLEXIBLE;  Surgeon: ALBERTO Amin MD;  Location: [facility] ENDO (2ND FLR);  Service: Endoscopy;  Laterality: N/A;    FLEXIBLE SIGMOIDOSCOPY N/A 5/21/2019    Procedure: SIGMOIDOSCOPY, FLEXIBLE;  Surgeon: ALBERTO Amin MD;  Location: [facility] ENDO (2ND FLR);  Service: Endoscopy;  Laterality: N/A;    FUSION OF LUMBAR SPINE BY ANTERIOR APPROACH Left 4/12/2019    Procedure: FUSION, SPINE, LUMBAR, ANTERIOR APPROACH Left L5-S1 Anterior to Psoa Interbody Fusion, L5-S1 Posterior Instrumentation;  Surgeon: Mk George MD;  Location: [facility] 2ND FLR;  Service: Neurosurgery;  Laterality: Left;  Porcedure:  Left L5-S1 Anterior to Psoa Interbody Fusion, L5-S1 Posterior Instrumentation  Surgery Time: 4 Hrs  LOS: 2-3  Anesthesia: General   Blood: Type & Screen  R    HAND SURGERY Left     HAND SURGERY Right     torn ligament repair/ Dr. Yeboah    HYSTERECTOMY      INJECTION OF STEROID Right 12/10/2020    Procedure: INJECTION, STEROID Right SI Joint Block and Steroid Injection;  Surgeon: Mk George MD;  Location: [facility] OR;  Service: Neurosurgery;  Laterality: Right;  Procedure: Right SI Joint Block and Steroid Injection   SUrgery Time: 30 Min  LOS: 0  Anesthesia: MAC  Radiology: C-arm  Bed: [place] [id number] Poster  Position: Prone    INJECTION OF STEROID Right 9/28/2021    Procedure: INJECTION, STEROID Right SI joint block & steroid injection;  Surgeon: Mk George MD;  Location: [facility];  Service: Neurosurgery;  Laterality: Right;  Procedure: Right SI joint block & steroid injection  Surgery Time: 30m  Anesthesia: Local  MAC  Radiology: C-arm  Bed: Regular  Position: Prone    IVUS, CORONARY  8/14/2023    Procedure: IVUS, Coronary;  Surgeon: Wilman Kim MD;  Location: [facility] CATH LAB;  Service: Cardiology;;    LAPAROSCOPIC CHOLECYSTECTOMY N/A 6/23/2023    Procedure: CHOLECYSTECTOMY, LAPAROSCOPIC;  Surgeon: Richard Penny MD;  Location: [address];  Service: General;  Laterality: N/A;    left foot surgery      left wrist surgery      LYSIS OF ADHESIONS N/A 2/19/2020    Procedure: LYSIS, ADHESIONS;  Surgeon: Robin Boyd MD;  Location: [address];  Service: Urology;  Laterality: N/A;    NASAL SEPTUM SURGERY  5/7/15    OPEN REDUCTION AND INTERNAL FIXATION (ORIF) OF FRACTURE OF PROXIMAL HUMERUS Left 7/12/2023    Procedure: ORIF, FRACTURE, HUMERUS, PROXIMAL ;  Surgeon: Tomasz Leary MD;  Location: [address];  Service: Orthopedics;  Laterality: Left;    PERCUTANEOUS NEPHROSTOMY Left 4/21/2019    Procedure: Creation, Nephrostomy, Percutaneous;  Surgeon: Karina Surgeon;  Location: [facility];  Service: Anesthesiology;  Laterality: Left;    REPAIR OF URETER  4/12/2019    Procedure: REPAIR, URETER;  Surgeon: Mk George MD;  Location: [address];  Service: Neurosurgery;;    REPLACEMENT OF NEPHROSTOMY TUBE N/A 7/18/2019    Procedure: REPLACEMENT, NEPHROSTOMY TUBE;  Surgeon: [facility] Diagnostic Provider;  Location: [address];  Service: Anesthesiology;  Laterality: N/A;  188    REPLACEMENT OF NEPHROSTOMY TUBE N/A 7/24/2019    Procedure: REPLACEMENT, NEPHROSTOMY TUBE;  Surgeon: [facility] Diagnostic Provider;  Location: [address];  Service: Anesthesiology;  Laterality: N/A;  188    REPLACEMENT OF NEPHROSTOMY TUBE N/A 10/7/2019    Procedure: REPLACEMENT, NEPHROSTOMY TUBE;  Surgeon: [facility] Diagnostic Provider;  Location: [address];  Service: Anesthesiology;  Laterality: N/A;  189    REPLACEMENT OF NEPHROSTOMY TUBE N/A 11/25/2019    Procedure: REPLACEMENT, NEPHROSTOMY TUBE;  Surgeon: [facility] Diagnostic  Provider;  [address];  Service: Anesthesiology;  Laterality: N/A;  Room 188/[place]    REPLACEMENT OF NEPHROSTOMY TUBE Right 2/19/2020    Procedure: REPLACEMENT, NEPHROSTOMY TUBE removal removal;  Surgeon: Robin Boyd MD;  [address];  Service: Urology;  Laterality: Right;    RIGHT HEART CATHETERIZATION Right 8/3/2023    Procedure: INSERTION, CATHETER, RIGHT HEART;  Surgeon: Aime George MD;  Location: [facility] CATH LAB;  Service: Cardiology;  Laterality: Right;    rt elbow surgery      S/P LAD COATED STENT  05/14/2010    6 total     S/P OM1 STENT  08/2003    SINUS SURGERY      F.E[place]S.S.    STENT, DRUG ELUTING, SINGLE VESSEL, CORONARY  8/14/2023    Procedure: Stent, Drug Eluting, Single Vessel, Coronary;  Surgeon: Wilman Kim MD;  Location: [facility] CATH LAB;  Service: Cardiology;;    TRACHEOSTOMY N/A 5/2/2019    Procedure: CREATION, TRACHEOSTOMY;  Surgeon: Sean Ruano MD;  Location: [address];  Service: General;  Laterality: N/A;    TUBAL LIGATION      URETERAL REIMPLANTION Left 2/19/2020    Procedure: REIMPLANTATION, URETER WITH PSOAS HITCH;  Surgeon: Robin Boyd MD;  Location: [address];  Service: Urology;  Laterality: Left;    VENTRICULOGRAM, LEFT  8/3/2023    Procedure: Ventriculogram, Left;  Surgeon: Aime George MD;  Location: [facility] CATH LAB;  Service: Cardiology;;       Time Tracking:     PT Received On: 08/27/23  PT Start Time: 1355     PT Stop Time: 1418  PT Total Time (min): 23 min     Billable Minutes: Re-eval 10 minutes and Therapeutic Activity 13 minutes      08/27/2023

## 2023-08-27 NOTE — NURSING
Pt AAO x 4, no c/o pain. Pt nauseated with intermittent vomitting  throughout the shift. PRN medications rotated and administered as ordered. Mild relief obtained. SR on the telemetry monitor. Oxygen saturations 97% on room air.  Pt changed position independently throughout the shift. Pt OOB with therapy and up to the BSC. Pt tolerated activity well. Pt had small BM. Senna and Miralax ordered and 1st doses given. LR 250ml bolus administered this shift. Pt tolerated fluids well. Fontenot catheter intact with stat lock. Family supportive at the bedside.

## 2023-08-27 NOTE — ASSESSMENT & PLAN NOTE
Difficult to control and leading to poor PO intake, has not had BM in several days, will put in a bowel regimen, and continue to treat cholangitis.

## 2023-08-27 NOTE — PLAN OF CARE
Pt Aox4 and follows commands. Room air. NSR. Pt very nauseous. PRN nausea medications given overnight. Provider made aware of urine retention overnight. Orders received to place correa. Correa placed. Pt tolerated well.. 450 UOP overnight. Bed in lowest position and call light within reach.

## 2023-08-27 NOTE — ASSESSMENT & PLAN NOTE
Presumed Cholangitis  Developed septic shock on 8/22 after ERCP, some notes indicate pus seen during stent exchange, she then became hypotensive and required MICU admission for levophed. She had a troponin elevation, thought to be demand ischemia from hypotension in setting of recent STEMI. She was on vanc/cefepime and transferred to  on 8/25, she was noted to have new spastic movements, cefipime changed to cipro/flagyl due to high vanc trough to avoid nephrotoxicity with zosyn. WBC improving, still nauseous  - Vanc/cefepime/flagyl - plan to continue until at least 8/30 per GI recs  - Follow up bcx, NGTD, last done 8/25 for fever  - Continued nausea  -

## 2023-08-27 NOTE — SUBJECTIVE & OBJECTIVE
Interval History: Retaining urine overnight, correa placed, still nauseous    Review of Systems   Constitutional:  Positive for fatigue.   Respiratory:  Negative for shortness of breath.    Cardiovascular:  Negative for chest pain.   Gastrointestinal:  Negative for abdominal pain.   Genitourinary:  Negative for dysuria and frequency.   Neurological:  Negative for dizziness and headaches.     Objective:     Vital Signs (Most Recent):  Temp: 98.5 °F (36.9 °C) (08/27/23 0730)  Pulse: 79 (08/27/23 0730)  Resp: 18 (08/27/23 0730)  BP: 113/67 (08/27/23 0730)  SpO2: 97 % (08/27/23 0730) Vital Signs (24h Range):  Temp:  [98.2 °F (36.8 °C)-99.3 °F (37.4 °C)] 98.5 °F (36.9 °C)  Pulse:  [79-99] 79  Resp:  [14-26] 18  SpO2:  [97 %-100 %] 97 %  BP: (113-141)/(58-72) 113/67     Weight: 60.8 kg (134 lb 0.6 oz)  Body mass index is 23.01 kg/m².    Intake/Output Summary (Last 24 hours) at 8/27/2023 1052  Last data filed at 8/27/2023 0745  Gross per 24 hour   Intake 1217.84 ml   Output 1400 ml   Net -182.16 ml           Physical Exam  Constitutional:       Appearance: She is ill-appearing.   HENT:      Head: Normocephalic and atraumatic.   Cardiovascular:      Rate and Rhythm: Normal rate and regular rhythm.      Pulses: Normal pulses.      Heart sounds: Normal heart sounds.   Pulmonary:      Effort: Pulmonary effort is normal.      Comments: Crackles in RLL  Abdominal:      General: Abdomen is flat.      Tenderness: There is abdominal tenderness.      Comments: TTP in mid abdomen to deep palpation   Musculoskeletal:      Right lower leg: No edema.      Left lower leg: No edema.   Skin:     General: Skin is warm.      Capillary Refill: Capillary refill takes less than 2 seconds.      Coloration: Skin is not jaundiced or pale.   Neurological:      General: No focal deficit present.      Mental Status: She is alert and oriented to person, place, and time.   Psychiatric:         Mood and Affect: Mood normal.             Significant Labs:  "All pertinent labs within the past 24 hours have been reviewed.  BMP:   Recent Labs   Lab 08/27/23  0552         K 3.7      CO2 20*   BUN 18   CREATININE 1.1   CALCIUM 7.8*   MG 2.2       CBC:   Recent Labs   Lab 08/26/23  0431 08/27/23  0850   WBC 13.08* 12.06   HGB 8.4* 8.2*   HCT 27.0* 25.8*    204       CMP:   Recent Labs   Lab 08/26/23  0431 08/26/23  1653 08/27/23  0552    136 137   K 3.5 4.0 3.7    107 109   CO2 22* 20* 20*   GLU 99 137* 103   BUN 18 18 18   CREATININE 1.4 1.2 1.1   CALCIUM 8.6* 8.0* 7.8*   PROT 6.8  --  6.1   ALBUMIN 1.3*  --  1.2*   BILITOT 4.0*  --  2.8*   ALKPHOS 678*  --  564*   *  --  78*   ALT 72*  --  49*   ANIONGAP 11 9 8       Lactic Acid:   Recent Labs   Lab 08/25/23  1604   LACTATE 1.4       Troponin: No results for input(s): "TROPONINI", "TROPONINIHS" in the last 48 hours.    Urine Culture: No results for input(s): "LABURIN" in the last 48 hours.  Urine Studies: No results for input(s): "COLORU", "APPEARANCEUA", "PHUR", "SPECGRAV", "PROTEINUA", "GLUCUA", "KETONESU", "BILIRUBINUA", "OCCULTUA", "NITRITE", "UROBILINOGEN", "LEUKOCYTESUR", "RBCUA", "WBCUA", "BACTERIA", "SQUAMEPITHEL", "HYALINECASTS" in the last 48 hours.    Invalid input(s): "WRIGHTSUR"    Significant Imaging: I have reviewed all pertinent imaging results/findings within the past 24 hours.  "

## 2023-08-28 LAB
ALBUMIN SERPL BCP-MCNC: 1.2 G/DL (ref 3.5–5.2)
ALP SERPL-CCNC: 570 U/L (ref 55–135)
ALT SERPL W/O P-5'-P-CCNC: 36 U/L (ref 10–44)
ANION GAP SERPL CALC-SCNC: 9 MMOL/L (ref 8–16)
APTT PPP: 55.2 SEC (ref 21–32)
AST SERPL-CCNC: 60 U/L (ref 10–40)
BACTERIA BLD CULT: NORMAL
BACTERIA BLD CULT: NORMAL
BASOPHILS # BLD AUTO: 0.02 K/UL (ref 0–0.2)
BASOPHILS NFR BLD: 0.2 % (ref 0–1.9)
BILIRUB SERPL-MCNC: 2.4 MG/DL (ref 0.1–1)
BUN SERPL-MCNC: 15 MG/DL (ref 8–23)
CALCIUM SERPL-MCNC: 7.8 MG/DL (ref 8.7–10.5)
CHLORIDE SERPL-SCNC: 108 MMOL/L (ref 95–110)
CO2 SERPL-SCNC: 18 MMOL/L (ref 23–29)
CREAT SERPL-MCNC: 1 MG/DL (ref 0.5–1.4)
DIFFERENTIAL METHOD: ABNORMAL
EOSINOPHIL # BLD AUTO: 0.1 K/UL (ref 0–0.5)
EOSINOPHIL NFR BLD: 1.1 % (ref 0–8)
ERYTHROCYTE [DISTWIDTH] IN BLOOD BY AUTOMATED COUNT: 19.2 % (ref 11.5–14.5)
EST. GFR  (NO RACE VARIABLE): >60 ML/MIN/1.73 M^2
GLUCOSE SERPL-MCNC: 88 MG/DL (ref 70–110)
HCT VFR BLD AUTO: 24.7 % (ref 37–48.5)
HGB BLD-MCNC: 7.7 G/DL (ref 12–16)
IMM GRANULOCYTES # BLD AUTO: 0.07 K/UL (ref 0–0.04)
IMM GRANULOCYTES NFR BLD AUTO: 0.6 % (ref 0–0.5)
INR PPP: 1.5 (ref 0.8–1.2)
LYMPHOCYTES # BLD AUTO: 1.2 K/UL (ref 1–4.8)
LYMPHOCYTES NFR BLD: 9.8 % (ref 18–48)
MAGNESIUM SERPL-MCNC: 2.2 MG/DL (ref 1.6–2.6)
MCH RBC QN AUTO: 26.5 PG (ref 27–31)
MCHC RBC AUTO-ENTMCNC: 31.2 G/DL (ref 32–36)
MCV RBC AUTO: 85 FL (ref 82–98)
MONOCYTES # BLD AUTO: 0.7 K/UL (ref 0.3–1)
MONOCYTES NFR BLD: 5.9 % (ref 4–15)
NEUTROPHILS # BLD AUTO: 9.9 K/UL (ref 1.8–7.7)
NEUTROPHILS NFR BLD: 82.4 % (ref 38–73)
NRBC BLD-RTO: 0 /100 WBC
PLATELET # BLD AUTO: 213 K/UL (ref 150–450)
PMV BLD AUTO: 12.9 FL (ref 9.2–12.9)
POCT GLUCOSE: 90 MG/DL (ref 70–110)
POCT GLUCOSE: 92 MG/DL (ref 70–110)
POCT GLUCOSE: 94 MG/DL (ref 70–110)
POCT GLUCOSE: 96 MG/DL (ref 70–110)
POCT GLUCOSE: 96 MG/DL (ref 70–110)
POTASSIUM SERPL-SCNC: 3.4 MMOL/L (ref 3.5–5.1)
PROT SERPL-MCNC: 6.1 G/DL (ref 6–8.4)
PROTHROMBIN TIME: 15.5 SEC (ref 9–12.5)
RBC # BLD AUTO: 2.91 M/UL (ref 4–5.4)
SODIUM SERPL-SCNC: 135 MMOL/L (ref 136–145)
VANCOMYCIN SERPL-MCNC: 16.2 UG/ML
WBC # BLD AUTO: 12.05 K/UL (ref 3.9–12.7)

## 2023-08-28 PROCEDURE — 25000003 PHARM REV CODE 250: Performed by: STUDENT IN AN ORGANIZED HEALTH CARE EDUCATION/TRAINING PROGRAM

## 2023-08-28 PROCEDURE — 99232 PR SUBSEQUENT HOSPITAL CARE,LEVL II: ICD-10-PCS | Mod: ,,, | Performed by: NURSE PRACTITIONER

## 2023-08-28 PROCEDURE — 80053 COMPREHEN METABOLIC PANEL: CPT

## 2023-08-28 PROCEDURE — 85025 COMPLETE CBC W/AUTO DIFF WBC: CPT | Performed by: STUDENT IN AN ORGANIZED HEALTH CARE EDUCATION/TRAINING PROGRAM

## 2023-08-28 PROCEDURE — 83735 ASSAY OF MAGNESIUM: CPT | Performed by: STUDENT IN AN ORGANIZED HEALTH CARE EDUCATION/TRAINING PROGRAM

## 2023-08-28 PROCEDURE — 63600175 PHARM REV CODE 636 W HCPCS: Performed by: STUDENT IN AN ORGANIZED HEALTH CARE EDUCATION/TRAINING PROGRAM

## 2023-08-28 PROCEDURE — 94761 N-INVAS EAR/PLS OXIMETRY MLT: CPT

## 2023-08-28 PROCEDURE — 97530 THERAPEUTIC ACTIVITIES: CPT

## 2023-08-28 PROCEDURE — 99233 SBSQ HOSP IP/OBS HIGH 50: CPT | Mod: ,,, | Performed by: STUDENT IN AN ORGANIZED HEALTH CARE EDUCATION/TRAINING PROGRAM

## 2023-08-28 PROCEDURE — 25000003 PHARM REV CODE 250: Performed by: INTERNAL MEDICINE

## 2023-08-28 PROCEDURE — 99232 SBSQ HOSP IP/OBS MODERATE 35: CPT | Mod: ,,, | Performed by: NURSE PRACTITIONER

## 2023-08-28 PROCEDURE — 94660 CPAP INITIATION&MGMT: CPT

## 2023-08-28 PROCEDURE — 99233 PR SUBSEQUENT HOSPITAL CARE,LEVL III: ICD-10-PCS | Mod: ,,, | Performed by: STUDENT IN AN ORGANIZED HEALTH CARE EDUCATION/TRAINING PROGRAM

## 2023-08-28 PROCEDURE — 85730 THROMBOPLASTIN TIME PARTIAL: CPT | Performed by: INTERNAL MEDICINE

## 2023-08-28 PROCEDURE — 63600175 PHARM REV CODE 636 W HCPCS: Performed by: EMERGENCY MEDICINE

## 2023-08-28 PROCEDURE — 80202 ASSAY OF VANCOMYCIN: CPT | Performed by: STUDENT IN AN ORGANIZED HEALTH CARE EDUCATION/TRAINING PROGRAM

## 2023-08-28 PROCEDURE — 99900035 HC TECH TIME PER 15 MIN (STAT)

## 2023-08-28 PROCEDURE — 85610 PROTHROMBIN TIME: CPT | Performed by: STUDENT IN AN ORGANIZED HEALTH CARE EDUCATION/TRAINING PROGRAM

## 2023-08-28 PROCEDURE — 63600175 PHARM REV CODE 636 W HCPCS

## 2023-08-28 PROCEDURE — 36415 COLL VENOUS BLD VENIPUNCTURE: CPT | Performed by: INTERNAL MEDICINE

## 2023-08-28 PROCEDURE — 20600001 HC STEP DOWN PRIVATE ROOM

## 2023-08-28 RX ORDER — PROMETHAZINE HYDROCHLORIDE 12.5 MG/1
12.5 TABLET ORAL EVERY 6 HOURS PRN
Status: DISCONTINUED | OUTPATIENT
Start: 2023-08-28 | End: 2023-08-30

## 2023-08-28 RX ADMIN — CIPROFLOXACIN 400 MG: 2 INJECTION, SOLUTION INTRAVENOUS at 12:08

## 2023-08-28 RX ADMIN — ENOXAPARIN SODIUM 40 MG: 40 INJECTION SUBCUTANEOUS at 05:08

## 2023-08-28 RX ADMIN — PROMETHAZINE HYDROCHLORIDE 12.5 MG: 12.5 TABLET ORAL at 10:08

## 2023-08-28 RX ADMIN — OXYCODONE HYDROCHLORIDE 5 MG: 5 TABLET ORAL at 11:08

## 2023-08-28 RX ADMIN — PROCHLORPERAZINE MALEATE 5 MG: 5 TABLET ORAL at 09:08

## 2023-08-28 RX ADMIN — SENNOSIDES 8.6 MG: 8.6 TABLET, FILM COATED ORAL at 08:08

## 2023-08-28 RX ADMIN — POLYETHYLENE GLYCOL 3350 17 G: 17 POWDER, FOR SOLUTION ORAL at 08:08

## 2023-08-28 RX ADMIN — CLOPIDOGREL BISULFATE 75 MG: 75 TABLET ORAL at 08:08

## 2023-08-28 RX ADMIN — ONDANSETRON 4 MG: 2 INJECTION INTRAMUSCULAR; INTRAVENOUS at 12:08

## 2023-08-28 RX ADMIN — ASPIRIN 81 MG: 81 TABLET, COATED ORAL at 08:08

## 2023-08-28 RX ADMIN — CIPROFLOXACIN 400 MG: 2 INJECTION, SOLUTION INTRAVENOUS at 10:08

## 2023-08-28 RX ADMIN — METRONIDAZOLE 500 MG: 500 INJECTION, SOLUTION INTRAVENOUS at 10:08

## 2023-08-28 RX ADMIN — METRONIDAZOLE 500 MG: 500 INJECTION, SOLUTION INTRAVENOUS at 02:08

## 2023-08-28 RX ADMIN — METRONIDAZOLE 500 MG: 500 INJECTION, SOLUTION INTRAVENOUS at 06:08

## 2023-08-28 RX ADMIN — ESCITALOPRAM OXALATE 10 MG: 10 TABLET ORAL at 08:08

## 2023-08-28 RX ADMIN — ATORVASTATIN CALCIUM 40 MG: 40 TABLET, FILM COATED ORAL at 08:08

## 2023-08-28 RX ADMIN — PROMETHAZINE HYDROCHLORIDE 12.5 MG: 12.5 TABLET ORAL at 04:08

## 2023-08-28 NOTE — NURSING
Patient stable throughout the night. VS Stable. Patient complained of N/V during the night and 4mg of zofran was given, patient rested well after however, no other significant changes with this patient. Will continue to monitor patient until shift change.

## 2023-08-28 NOTE — SUBJECTIVE & OBJECTIVE
"Interval HPI:   Overnight events: No acute events overnight. Patient in room 40158/23734 A. Blood glucose stable. BG below goal on current insulin regimen (SSI and prandial insulin). Steroid use- None. 6 Days Post-Op  Renal function- Normal   Vasopressors-  None       Endocrine will continue to follow and manage insulin orders inpatient.         Diet diabetic Low Sodium,2gm; 2000 Calorie; Fluid - 2000mL     Eatin%  Nausea: No  Hypoglycemia and intervention: No  Fever: No  TPN and/or TF: No      BP (!) 141/77 (BP Location: Right arm, Patient Position: Lying)   Pulse 80   Temp 97.7 °F (36.5 °C) (Oral)   Resp 18   Ht 5' 4" (1.626 m)   Wt 61.7 kg (136 lb 0.4 oz)   LMP  (LMP Unknown)   SpO2 98%   Breastfeeding No   BMI 23.35 kg/m²     Labs Reviewed and Include    Recent Labs   Lab 23  0802   GLU 88   CALCIUM 7.8*   ALBUMIN 1.2*   PROT 6.1   *   K 3.4*   CO2 18*      BUN 15   CREATININE 1.0   ALKPHOS 570*   ALT 36   AST 60*   BILITOT 2.4*     Lab Results   Component Value Date    WBC 12.05 2023    HGB 7.7 (L) 2023    HCT 24.7 (L) 2023    MCV 85 2023     2023     No results for input(s): "TSH", "FREET4" in the last 168 hours.  Lab Results   Component Value Date    HGBA1C 8.8 (H) 07/10/2023       Nutritional status:   Body mass index is 23.35 kg/m².  Lab Results   Component Value Date    ALBUMIN 1.2 (L) 2023    ALBUMIN 1.2 (L) 2023    ALBUMIN 1.3 (L) 2023     No results found for: "PREALBUMIN"    Estimated Creatinine Clearance: 50.4 mL/min (based on SCr of 1 mg/dL).    Accu-Checks  Recent Labs     23  1202 23  1647 23  1934 23  0442 23  0814 23  1203 23  1554 23  2100 23  0619 23  0819   POCTGLUCOSE 126* 142* 112* 110 106 202* 90 110 96 94       Current Medications and/or Treatments Impacting Glycemic Control  Immunotherapy:    Immunosuppressants       None          Steroids: "   Hormones (From admission, onward)      Start     Stop Route Frequency Ordered    08/15/23 2231  melatonin tablet 9 mg         -- Oral Nightly PRN 08/15/23 2231          Pressors:    Autonomic Drugs (From admission, onward)      Start     Stop Route Frequency Ordered    08/23/23 0430  NORepinephrine bitartrate-D5W 4 mg/250 mL (16 mcg/mL) PERIPHERAL access infusion        Question Answer Comment   Begin at (in mcg/kg/min): 0.02    Titrate by: (in mcg/kg/min) 0.02    Titrate interval: (in minutes) 5    Titrate to maintain: (MAP or SBP) MAP    Greater than: (in mmHg) 65    Maximum dose: (in mcg/kg/min) 0.2        08/24/23 0423 IV Continuous 08/23/23 0322          Hyperglycemia/Diabetes Medications:   Antihyperglycemics (From admission, onward)      Start     Stop Route Frequency Ordered    08/18/23 0830  insulin aspart U-100 pen 1-10 Units         -- SubQ Before meals & nightly PRN 08/18/23 0730           [phone]

## 2023-08-28 NOTE — SUBJECTIVE & OBJECTIVE
Interval History: No acute events. Pt with nausea and vomiting this am, being given compazine  Tolerated some pudding yesterday  denies shortness of breath  AFebrile    Review of Systems  Objective:     Vital Signs (Most Recent):  Temp: 97.8 °F (36.6 °C) (08/28/23 1117)  Pulse: 81 (08/28/23 1117)  Resp: 17 (08/28/23 1117)  BP: (!) 147/73 (08/28/23 1117)  SpO2: 100 % (08/28/23 1117) Vital Signs (24h Range):  Temp:  [97.7 °F (36.5 °C)-98.5 °F (36.9 °C)] 97.8 °F (36.6 °C)  Pulse:  [79-86] 81  Resp:  [14-28] 17  SpO2:  [96 %-100 %] 100 %  BP: (108-147)/(53-77) 147/73     Weight: 61.7 kg (136 lb 0.4 oz)  Body mass index is 23.35 kg/m².    Intake/Output Summary (Last 24 hours) at 8/28/2023 1305  Last data filed at 8/27/2023 1750  Gross per 24 hour   Intake 120 ml   Output 275 ml   Net -155 ml         Physical Exam  Constitutional:       General: She is not in acute distress.     Appearance: Normal appearance. She is ill-appearing. She is not toxic-appearing or diaphoretic.   Cardiovascular:      Rate and Rhythm: Normal rate and regular rhythm.      Heart sounds: No murmur heard.     No friction rub. No gallop.   Pulmonary:      Effort: Pulmonary effort is normal. No respiratory distress.      Breath sounds: Normal breath sounds. No wheezing or rales.   Abdominal:      General: Abdomen is flat. There is no distension.      Palpations: Abdomen is soft.      Tenderness: There is no abdominal tenderness. There is no guarding or rebound.   Musculoskeletal:      Right lower leg: No edema.      Left lower leg: No edema.   Neurological:      Mental Status: She is alert.             Significant Labs: All pertinent labs within the past 24 hours have been reviewed.    Significant Imaging: I have reviewed all pertinent imaging results/findings within the past 24 hours.

## 2023-08-28 NOTE — PROGRESS NOTES
Therapy with vancomycin complete and/or consult discontinued by provider.  Pharmacy will sign off, please re-consult as needed.    Thank you,  Prerna DominguezD

## 2023-08-28 NOTE — PLAN OF CARE
08/28/23 1214   Post-Acute Status   Post-Acute Authorization Placement   Post-Acute Placement Status Referrals Sent       Patient is in need of skilled nursing facility placement post dc. [facility] will [facility] to follow up.      Marya Dunne LMSW  [facility]   [id number]

## 2023-08-28 NOTE — PROGRESS NOTES
"[facility] - [facility] ([facility]  Endocrinology  Progress Note    Admit Date: 8/14/2023     Reason for Consult: Management of T2DM,       Diabetes diagnosis year: pt not sure - states "long time ago"; at least as far back as 2004 per review of [facility]     Home Diabetes Medications:  Glipizide ER 10mg before breakfast. Levemir 2u daily on a sliding scale   How often checking glucose at home?  Usually 2x/day,   BG readings on regimen: Upper 100s- 200s  Hypoglycemia on the regimen? Lows on 12 units of Levemir  Missed doses on regimen?  Diabetes Complications include:     Hyperglycemia, Diabetic chronic kidney disease     , Diabetic peripheral neuropathy , and Diabetic peripheral angiopathy with/without gangrene     Complicating diabetes co morbidities:   History of MI, MURTAZA, and CKD        HPI:   Patient is a 63 y.o. female with CAD with GINGER , HTN, HLD, DM2, CKD, MURTAZA (not on CPAP), PAD, who presented to the ED to the ER after a near syncopal event at home around 4 pm. She also feels tired and her appetite lately has decreased. she had chest pain and visceral symptoms with nausea and vomiting. She reports losing weight as well.She has had abdominal symptoms with poor PO intake leading to orthostasis. Today she had another near syncopal event and is why she was brought to the ER. Here her ECG showed the posterior changes for which posterior EKG was obtained with showed STEMI. Bedside echo showed an EF 45-50% and norma-lateral and infero-lateral hypokinesis. She was taken to cath lab and her symptoms resolved after PCI. Please refer to PCI for full details. Endocrine consulted for bg management.      Interval HPI:   Overnight events: No acute events overnight. Patient in room 86380/34291 A. Blood glucose stable. BG below goal on current insulin regimen (SSI and prandial insulin). Steroid use- None. 6 Days Post-Op  Renal function- Normal   Vasopressors-  None       Endocrine will continue to follow " "and manage insulin orders inpatient.         Diet diabetic Low Sodium,2gm; 2000 Calorie; Fluid - 2000mL     Eatin%  Nausea: No  Hypoglycemia and intervention: No  Fever: No  TPN and/or TF: No      BP (!) 141/77 (BP Location: Right arm, Patient Position: Lying)   Pulse 80   Temp 97.7 °F (36.5 °C) (Oral)   Resp 18   Ht 5' 4" (1.626 m)   Wt 61.7 kg (136 lb 0.4 oz)   LMP  (LMP Unknown)   SpO2 98%   Breastfeeding No   BMI 23.35 kg/m²     Labs Reviewed and Include    Recent Labs   Lab 23  0802   GLU 88   CALCIUM 7.8*   ALBUMIN 1.2*   PROT 6.1   *   K 3.4*   CO2 18*      BUN 15   CREATININE 1.0   ALKPHOS 570*   ALT 36   AST 60*   BILITOT 2.4*     Lab Results   Component Value Date    WBC 12.05 2023    HGB 7.7 (L) 2023    HCT 24.7 (L) 2023    MCV 85 2023     2023     No results for input(s): "TSH", "FREET4" in the last 168 hours.  Lab Results   Component Value Date    HGBA1C 8.8 (H) 07/10/2023       Nutritional status:   Body mass index is 23.35 kg/m².  Lab Results   Component Value Date    ALBUMIN 1.2 (L) 2023    ALBUMIN 1.2 (L) 2023    ALBUMIN 1.3 (L) 2023     No results found for: "PREALBUMIN"    Estimated Creatinine Clearance: 50.4 mL/min (based on SCr of 1 mg/dL).    Accu-Checks  Recent Labs     23  1202 23  1647 23  1934 23  0442 23  0814 23  1203 23  1554 23  2100 23  0619 23  0819   POCTGLUCOSE 126* 142* 112* 110 106 202* 90 110 96 94       Current Medications and/or Treatments Impacting Glycemic Control  Immunotherapy:    Immunosuppressants       None          Steroids:   Hormones (From admission, onward)      Start     Stop Route Frequency Ordered    08/15/23 2231  melatonin tablet 9 mg         -- Oral Nightly PRN 08/15/23 2231          Pressors:    Autonomic Drugs (From admission, onward)      Start     Stop Route Frequency Ordered    23 0430  NORepinephrine " bitartrate-D5W 4 mg/250 mL (16 mcg/mL) PERIPHERAL access infusion        Question Answer Comment   Begin at (in mcg/kg/min): 0.02    Titrate by: (in mcg/kg/min) 0.02    Titrate interval: (in minutes) 5    Titrate to maintain: (MAP or SBP) MAP    Greater than: (in mmHg) 65    Maximum dose: (in mcg/kg/min) 0.2        08/24/23 0423 IV Continuous 08/23/23 0322          Hyperglycemia/Diabetes Medications:   Antihyperglycemics (From admission, onward)      Start     Stop Route Frequency Ordered    08/18/23 0830  insulin aspart U-100 pen 1-10 Units         -- SubQ Before meals & nightly PRN 08/18/23 0730            ASSESSMENT and PLAN    Cardiac/Vascular  * Shock  Managed per primary team  Avoid hypoglycemia        STEMI (ST elevation myocardial infarction)    Managed per primary team  Avoid hypoglycemia      Endocrine  Type 2 diabetes mellitus with diabetic peripheral angiopathy without gangrene  BG goal: 140-180     - Novolog /25  - POCT Glucose before meals and at bedtime  - Hypoglycemia protocol in place      ** Please notify Endocrine for any change and/or advance in diet**  ** Please call Endocrine for any BG related issues **     Discharge Planning:   TBD. Please notify endocrinology prior to discharge.               Blayne Gray, DNP, FNP  Endocrinology  [facility] - Intensive Care ([place])

## 2023-08-28 NOTE — PROGRESS NOTES
Pharmacokinetic Assessment Follow Up: IV Vancomycin    Vancomycin serum concentration assessment(s):    -Vancomycin random level can be used to guide therapy.  -A level of 16.2 mcg/mL is within goal 15-20 mcg/mL for sepsis/IAI.  -PAPA improving.    Vancomycin Regimen Plan:    -Schedule vancomycin 750 mg IV Q24H.  -Trough on 8/30 @ 11:00.     Drug levels (last 3 results):  Recent Labs   Lab Result Units 08/26/23  0431 08/27/23  0552 08/28/23  0802   Vancomycin, Random ug/mL 19.6 18.2 16.2       Pharmacy will continue to follow and monitor vancomycin.    Please contact pharmacy at extension [phone] for questions regarding this assessment.    Thank you for the consult,   Pancho Mars       Patient brief summary:  Mariann Huff is a 63 y.o. female initiated on antimicrobial therapy with IV Vancomycin for treatment of sepsis    Drug Allergies:   Review of patient's allergies indicates:   Allergen Reactions    Milk containing products (dairy)      Causes GI distress       Actual Body Weight:   61.7 kg    Renal Function:   Estimated Creatinine Clearance: 50.4 mL/min (based on SCr of 1 mg/dL).,     Dialysis Method (if applicable):  N/A    CBC (last 72 hours):  Recent Labs   Lab Result Units 08/26/23  0431 08/27/23  0850 08/28/23  0802   WBC K/uL 13.08* 12.06 12.05   Hemoglobin g/dL 8.4* 8.2* 7.7*   Hematocrit % 27.0* 25.8* 24.7*   Platelets K/uL 227 204 213   Gran % % 85.5* 82.4* 82.4*   Lymph % % 7.0* 8.8* 9.8*   Mono % % 5.7 7.5 5.9   Eosinophil % % 0.8 0.7 1.1   Basophil % % 0.2 0.2 0.2   Differential Method  Automated Automated Automated       Metabolic Panel (last 72 hours):  Recent Labs   Lab Result Units 08/26/23  0431 08/26/23  1518 08/26/23  1653 08/27/23  0552 08/28/23  0802   Sodium mmol/L 138  --  136 137 135*   Sodium, Urine mmol/L  --  19*  --   --   --    Potassium mmol/L 3.5  --  4.0 3.7 3.4*   Chloride mmol/L 105  --  107 109 108   CO2 mmol/L 22*  --  20* 20* 18*   Glucose mg/dL 99  --  137* 103 88   BUN  mg/dL 18  --  18 18 15   Creatinine mg/dL 1.4  --  1.2 1.1 1.0   Creatinine, Urine mg/dL  --  92.0  --   --   --    Albumin g/dL 1.3*  --   --  1.2* 1.2*   Total Bilirubin mg/dL 4.0*  --   --  2.8* 2.4*   Alkaline Phosphatase U/L 678*  --   --  564* 570*   AST U/L 134*  --   --  78* 60*   ALT U/L 72*  --   --  49* 36   Magnesium mg/dL 2.4  --   --  2.2 2.2       Vancomycin Administrations:  vancomycin given in the last 96 hours                     vancomycin 750 mg in dextrose 5 % (D5W) 250 mL IVPB (Vial-Mate) (mg) 750 mg New Bag 08/27/23 1015    vancomycin 750 mg in dextrose 5 % (D5W) 250 mL IVPB (Vial-Mate) (mg) 750 mg New Bag 08/26/23 0932    vancomycin (VANCOCIN) 1,000 mg in dextrose 5 % (D5W) 250 mL IVPB (Vial-Mate) (mg) 1,000 mg New Bag 08/25/23 0430                    Microbiologic Results:  Microbiology Results (last 7 days)       Procedure Component Value Units Date/Time    Blood culture [[id number]] Collected: 08/23/23 0046    Order Status: Completed Specimen: Blood Updated: 08/28/23 0612     Blood Culture, Routine No growth after 5 days.    Narrative:      Collection has been rescheduled by ANH1 at 08/22/2023 23:52 Reason:   Pt in ct  Collection has been rescheduled by ANH1 at 08/22/2023 23:52 Reason:   Pt in ct    Blood culture [[id number]] Collected: 08/23/23 0046    Order Status: Completed Specimen: Blood Updated: 08/28/23 0612     Blood Culture, Routine No growth after 5 days.    Narrative:      Collection has been rescheduled by ANH1 at 08/22/2023 23:52 Reason:   Pt in ct  Collection has been rescheduled by ANH1 at 08/22/2023 23:52 Reason:   Pt in ct    Blood culture [[id number]] Collected: 08/25/23 1206    Order Status: Completed Specimen: Blood Updated: 08/27/23 1412     Blood Culture, Routine No Growth to date      No Growth to date      No Growth to date    Blood culture [[id number]] Collected: 08/25/23 1202    Order Status: Completed Specimen: Blood Updated: 08/27/23 1412     Blood Culture, Routine  No Growth to date      No Growth to date      No Growth to date    Stool culture [[id number]] Collected: 08/24/23 1308    Order Status: Completed Specimen: Stool Updated: 08/26/23 1254     Stool Culture No Salmonella,Shigella,Vibrio,Campylobacter,Yersinia isolated.    E. coli 0157 antigen [[id number]] Collected: 08/24/23 1308    Order Status: Completed Specimen: Stool Updated: 08/25/23 1112     Shiga Toxin 1 E.coli Negative     Shiga Toxin 2 E.coli Negative    Clostridium difficile EIA [[id number]] Collected: 08/24/23 1308    Order Status: Completed Specimen: Stool Updated: 08/24/23 2225     C. diff Antigen Negative     C difficile Toxins A+B, EIA Negative     Comment: Testing not recommended for children <24 months old.       Urine culture [[id number]] Collected: 08/23/23 0235    Order Status: Completed Specimen: Urine Updated: 08/24/23 1358     Urine Culture, Routine No significant growth    Narrative:      Specimen Source->Urine    Blood culture [[id number]]     Order Status: Canceled Specimen: Blood

## 2023-08-28 NOTE — PT/OT/SLP PROGRESS
"Physical Therapy  Not Seen  Patient Name:  Mariann Huff   MRN:  [id number]  Admitting Diagnosis:  Shock   Recent Surgery: Procedure(s) (LRB):  ERCP (ENDOSCOPIC RETROGRADE CHOLANGIOPANCREATOGRAPHY) (N/A) 6 Days Post-Op  Admit Date: 8/14/2023  Length of Stay: 14 days    Patient not seen on this date for treatment - patient declined participation this morning, stating "I don't feel like it". She had a good session with OT today. PT will continue to follow. Please continue progressive mobility as appropriate.    Discharge Disposition Recommendation: [facility]    Geneva Segura, PT, DPT  8/28/2023      "

## 2023-08-28 NOTE — NURSING
Pt AAO x 4, no c/o pain at this time. Pt is on RA no SOB / distress noted. Pt remains on continuous telemetry monitoring with HR at NSR. Pt is continent of bowel. Pt has a 16 [place]serrat[place] correa urethral catheter in place to gravity, pt kuldeep any discomfort, urine is a bright orange color at this time. Pt skin is clean, dry, and intact. Pt has an overall flat affect. Delirium precautions maintained as ordered. Pt has PIV's to Left and right arm, both are clean, dry, intact, no redness or swelling flushed and saline locked. Bed is low and locked, call light in reach. Monitoring.

## 2023-08-28 NOTE — PROGRESS NOTES
[facility] ([address] Medicine  Progress Note    Patient Name: Mariann Huff  MRN: [id number]  Patient Class: IP- Inpatient   Admission Date: 8/14/2023  Length of Stay: 14 days  Attending Physician: Bolivar Shi*  Primary Care Provider: Jasbir Haney MD        Subjective:     Principal Problem:Shock        HPI:  63 y.o. female with CAD s/p GINGER to ostial LAD in 2014, HTN, HLD, DM2, MURTAZA who presented to the ED to the ER after a near syncopal event at home around 4 pm. She also feels tired and her appetite lately has decreased. she had chest pain and visceral symptoms with nausea and vomiting. She reports losing weight as well.She has had abdominal symptoms with poor PO intake leading to orthostasis. Today she had another near syncopal event and is why she was brought to the ER. Here her ECG showed the posterior changes for which posterior EKG was obtained with showed STEMI. Bedside echo showed an EF 45-50% and norma-lateral and infero-lateral hypokinesis. She was taken to cath lab and her symptoms resolved after PCI. Please refer to PCI for full details.       Overview/Hospital Course:  Patient was taken to cath lab and her symptoms resolved after PCI. Patient chest pain resolved.     Patient reported development of acute abdominal pain associated with nausea and vomiting. Patient was recently admitted (6/23) for suspected biliary pancreatitis and biliary stricture treated with biliary sphincterotomy, biliary stent, and cholecystectomy.    Patient now has worsening transaminitis w/ elevated T-Bili.   Concern for stent occlusion. Patient amylase 107 wnl, and Lipase 61. Workup will include total abdominal ultrasound and [facility] was consulted . Was scheduled for outpatient w/u of hepatic lesions found on previous admission 8/2/23.  Patient had ERCP and EUS on 8/17/23. Visualized a partial occluded stent in the biliary tree. One covered metal stent was  placed into the common bile duct. Concern for metastatic cancer is high due to multiple liver lesions and mass on pancreatic head per [facility]. Initially on previous hospital admission, it was believed to more likely be liver abscesses rather than malignancy due to acute nature of progression. S/p FNA biopsy with ERCP and stent placement on 8/17.     Became hypoglycemic on 8/19 and Hgb dropped from 8 to 6.3 with a reported dark stool, Improved to 7.1 after 1 unit PRBC, could not get a second unit due to respiratory distress, no further episodes of melena. Seen by GI, did not recommend endoscopy at this time for possible GIB.  Given lasix 20mg IV then 40mg IV with good output and improvement in respiratory status.    T bili continued to rise on 8/21, KUB did not show stent migration, patient underwent ERCP again on 8/22 which demonstrated a visibly ocluded stent in the biliary tree with a single biliary stricture in main BD. One covered metal stent placed in CBD. Several hours following this procedure, patient developed rapid onset fever to 103 and rapid drop in BP from SBP 180s to SBP 80s. Patient admitted to MICU for further management. She was continued on vanc/cefepime, briefly on levophed and then weaned off. Still with intermittent AMS and elevateed WBC, febrile 8/25. Adjusted abx to vanc/cipro/flagyl with improvement in mentation. Oral intake still poor, PAPA with Cr 1.4 on 8/26, improved with IVF. Still nauseous      Interval History: No acute events. Pt with nausea and vomiting this am, being given compazine  Tolerated some pudding yesterday  denies shortness of breath  AFebrile    Review of Systems  Objective:     Vital Signs (Most Recent):  Temp: 97.8 °F (36.6 °C) (08/28/23 1117)  Pulse: 81 (08/28/23 1117)  Resp: 17 (08/28/23 1117)  BP: (!) 147/73 (08/28/23 1117)  SpO2: 100 % (08/28/23 1117) Vital Signs (24h Range):  Temp:  [97.7 °F (36.5 °C)-98.5 °F (36.9 °C)] 97.8 °F (36.6 °C)  Pulse:  [79-86] 81  Resp:   [14-28] 17  SpO2:  [96 %-100 %] 100 %  BP: (108-147)/(53-77) 147/73     Weight: 61.7 kg (136 lb 0.4 oz)  Body mass index is 23.35 kg/m².    Intake/Output Summary (Last 24 hours) at 8/28/2023 1305  Last data filed at 8/27/2023 1750  Gross per 24 hour   Intake 120 ml   Output 275 ml   Net -155 ml         Physical Exam  Constitutional:       General: She is not in acute distress.     Appearance: Normal appearance. She is ill-appearing. She is not toxic-appearing or diaphoretic.   Cardiovascular:      Rate and Rhythm: Normal rate and regular rhythm.      Heart sounds: No murmur heard.     No friction rub. No gallop.   Pulmonary:      Effort: Pulmonary effort is normal. No respiratory distress.      Breath sounds: Normal breath sounds. No wheezing or rales.   Abdominal:      General: Abdomen is flat. There is no distension.      Palpations: Abdomen is soft.      Tenderness: There is no abdominal tenderness. There is no guarding or rebound.   Musculoskeletal:      Right lower leg: No edema.      Left lower leg: No edema.   Neurological:      Mental Status: She is alert.             Significant Labs: All pertinent labs within the past 24 hours have been reviewed.    Significant Imaging: I have reviewed all pertinent imaging results/findings within the past 24 hours.      Assessment/Plan:      * Shock  Presumed Cholangitis  Developed septic shock on 8/22 after ERCP, some notes indicate pus seen during stent exchange, she then became hypotensive and required MICU admission for levophed. She had a troponin elevation, thought to be demand ischemia from hypotension in setting of recent STEMI. She was on vanc/cefepime and transferred to  on 8/25, she was noted to have new spastic movements, cefipime changed to cipro/flagyl due to high vanc trough to avoid nephrotoxicity with zosyn. WBC improving, still nauseous  - Vanc/cefepime/flagyl - plan to continue until at least 8/30 per GI recs  - Follow up bcx, NGTD, last done 8/25 for  fever  - Continued nausea      Nausea  Difficult to control and leading to poor PO intake, has not had BM in several days, will put in a bowel regimen, and continue to treat cholangitis.      Cholangitis        Pancreatic cancer  Diagnosed this admission, will need follow up with oncology.       Atrial fibrillation  Patient with Paroxysmal (<7 days) atrial fibrillation which is controlled currently with Beta Blocker. Patient is currently in sinus rhythm.[id number] Score: 3. . Anticoagulation not indicated due to possible bleeding, was previously on lovenox but it is being held.    Biliary obstruction  Patient reports development of acute abdominal pain associated with nausea and vomiting. Patient was recently admitted (6/23) for suspected biliary pancreatitis and biliary stricture treated with biliary sphincterotomy, biliary stent, and cholecystectomy s/p ERCP w/ metal stent on 8/17, abdominal pain has resolved but T bili is still rising     - Trend T bili daily  -[facility] consulted, s/p ERCP w/ biopsy on 8/17, transaminases and alk phos improving but bili is worsening  -S/p repeat ERCP 8/22 w/ septic shock from presume cholangitis, see septic shock above  - LFTs improving       CHF (congestive heart failure)  -On week prior to admission had CXR with B/L edema, , EF decreased to 40-45%  -Got IV lasix while inpatient and was discharged on 40 po daily however became dehydrated and weak and LVEDP -in the lab showed LVEDP 9, reduce dose to 20 po  - Holding all GDMT in setting of recent shock, will resume once infection is better controlled  - Holding lasix for now    TTE 8/15/23    Left Ventricle: The left ventricle is normal in size. Ventricular mass is normal. Normal wall thickness. regional wall motion abnormalities present. See diagram for wall motion findings. There is moderately reduced systolic function. Ejection fraction by visual approximation is 40%. Grade II diastolic dysfunction.    Left Atrium: Left  atrium is mildly dilated. The left atrium volume index is 35.5 mL/m2.    Right Ventricle: Normal right ventricular cavity size. Wall thickness is normal. Right ventricle wall motion  is normal. Systolic function is normal.    Mitral Valve: There is mild regurgitation.    IVC/SVC: Normal venous pressure at 3 mmHg.    Liver parenchyma is inhomgenous.  Clinical correlation is required.      STEMI (ST elevation myocardial infarction)  -Started having near syncope around 4 pm on the day of admission  -Bedside echo in the ER showed an EF 45-50% and norma-lateral and infero-lateral hypokinesis  -She underwent Successful primary PCI of ramus intermedius and lateral branch with Pronto thrombectomy and 3X16 GINGER     Plan  - Continue aspirin 81 mg daily, stop after 1 month to avoid triple therapy  - Loaded with brilinta 180, loaded with plavix 600 8/15/23, and 75 qd there after  - Continue high intensity statin therapy (LDL goal < 70)  - TTE shows ejection fraction of about 40% associated with grade II diastolic dysfunction.  - Hold lasix for now  - Continue ASA/plavix  - Developed troponin elevation in setting of septic shock as well, seen by cardiology, TTE unchanged, no need for repeat LHC      Other specified anemias  Had notable hemoglobin drop, c/f GIB, however stools turned brown by time of GI evaluation, held lovenox for afib, still holding lovenox, may resume if hgb continues to be stable. Received 2 units PRBC this admission  - Daily CBC  - Hgb goal >8 due to recent STEMI  - Hold lovenox for now      NSTEMI (non-ST elevated myocardial infarction)  During septic shock, see STEMI above for further discussion      History of pancreatitis  Recent (6/2023) suspected biliary pancreatitis and biliary stricture treated with biliary sphincterotomy, biliary stent, and cholecystectomy.  CT A/P with new innumerable hepatic lesions:  1. Innumerable low attenuating hepatic lesions, new since the previous exam.  Malignancy  remains a concern however given short-term development, correlation is needed to exclude multifocal infection/abscess in this patient status post bile duct stent placement and cholecystectomy.  Please note, there is a low attenuating focus within the gallbladder fossa, similar to the foci throughout the hepatic parenchyma..  2. Pancreatic ductal dilatation up to 4 mm, minimally increased compared to prior.  There is fullness of the pancreatic head, underlying mass is not excluded, correlation with recent ERCP advised.  3. Simple and complex bilateral renal cysts.  4. Biliary stent in place with expected pneumobilia.  5. Cholecystectomy with scattered fluid about the gallbladder fossa.  6. Moderate stool throughout the colon, may reflect developing constipation.  7.  Additional findings as above.     - concern for lesions seen on CT A/P, underwent biopsy, now confirm pancreatic CA  - Patient was found to have a hypoechoic pancreatic mass on abdominal US  - Was also found to have this mass on EUS concerning for malignancy   - EUS and ERCP 8/17/23 and then again on 8/22    Altered mental status  Fluctuating mentation since 8/22, at times forgetful and slow to respond, AAOx3 but not aware of situation, ammonia WNL, calcium normal, CT head w/ contrast without evidence of mets or abnormality. Improved on 8/26.      Type 2 diabetes mellitus with stage 2 chronic kidney disease and hypertension  Home Antihyperglycemic Regiment:  -Farxiga, glipizide, insulin daily at home  - Titrate and addition of insulin as needed for BG goal 140-180  - SSI with POCT accuchecks ACHS and Diabetic diet 2000 beti  - Diabetic nutritional counseling given   - Continue home gabapentin for diabetic peripheral neuropathy  - Appreciate Endocrinology recs  - Adjust insulin and monitor closely  - Endocrine following    PAPA (acute kidney injury)  Resolved  Patient with acute kidney injury/acute renal failure likely due to pre-renal azotemia due to  dehydration PAPA is currently improving. Baseline creatinine 0.9 - Labs reviewed- Renal function/electrolytes with Estimated Creatinine Clearance: 45.8 mL/min (based on SCr of 1.1 mg/dL). according to latest data. Monitor urine output and serial BMP and adjust therapy as needed. Avoid nephrotoxins and renally dose meds for GFR listed above.  - Improved with IVF    VTE Risk Mitigation (From admission, onward)         Ordered     enoxaparin injection 40 mg  Every 24 hours         08/23/23 1026                Discharge Planning   REBECCA: 8/28/2023     Code Status: Full Code   Is the patient medically ready for discharge?: No    Reason for patient still in hospital (select all that apply): Patient trending condition and Treatment  Discharge Plan A: Home Health   Discharge Delays: None known at this time      Bolivar Shi MD  Department of Hospital Medicine   [facility] - Intensive Care ([place]

## 2023-08-28 NOTE — PT/OT/SLP PROGRESS
Occupational Therapy   Treatment    Name: Mariann Huff  MRN: [id number]  Admitting Diagnosis:  Shock  6 Days Post-Op    Recommendations:     Discharge Recommendations: nursing facility, skilled  Discharge Equipment Recommendations:  walker, rolling  Barriers to discharge:  Other (Comment) (requires increased assist)    Assessment:     Mariann Huff is a 63 y.o. female with a medical diagnosis of Shock.  She presents with deficits in self-care tasks as well as mobility and endurance.  Session limited by nausea on this date. Pt. Agreeable to sitting  up in chair . Performance deficits affecting function are weakness, impaired endurance, impaired self care skills, impaired functional mobility, pain.     Rehab Prognosis:  Good; patient would benefit from acute skilled OT services to address these deficits and reach maximum level of function.       Plan:     Patient to be seen 4 x/week to address the above listed problems via self-care/home management, therapeutic activities, therapeutic exercises, neuromuscular re-education  Plan of Care Expires: 09/26/23  Plan of Care Reviewed with: patient    Subjective     Chief Complaint: pt. Reported having a stomach ache and being nauseated  Patient/Family Comments/goals: to feel better  Pain/Comfort:  Pain Rating 1: 8/10  Location 1: abdomen  Pain Addressed 1: Reposition, Distraction, Nurse notified  Pain Rating Post-Intervention 1: 8/10    Objective:     Communicated with: nurse prior to session.  Patient found supine with telemetry, pulse ox (continuous), correa catheter upon OT entry to room.  Spouse present    General Precautions: Standard, fall    Orthopedic Precautions:N/A  Braces: N/A  Respiratory Status: Room air     Occupational Performance:     Bed Mobility:    Patient completed Supine to Sit with moderate assistance     Functional Mobility/Transfers:  Patient completed Sit <> Stand Transfer with moderate assistance  with  no assistive device   Functional Mobility:  Pt. Performed side steps to chair with Min A    Activities of Daily Living:  Grooming: supervision seated to wash face      AMPAC 6 Click ADL: 17    Treatment & Education:  Pt. Educated on importance of therapy/OOB  Pt. Educated on need for staff assist for all mobility      Patient left up in chair with all lines intact, call button in reach, and spouse and MD present    GOALS:   Multidisciplinary Problems       Occupational Therapy Goals          Problem: Occupational Therapy    Goal Priority Disciplines Outcome Interventions   Occupational Therapy Goal     OT, PT/OT Ongoing, Progressing    Description: Goals to be met by: 9/17/23 (1 month)     Patient will increase functional independence with ADLs by performing:    UE Dressing with Supervision.  LE Dressing with Supervision.  Grooming while standing at sink with Modified [place].  Toileting from toilet with Modified [place] for hygiene and clothing management.   Step transfer with Supervision  Toilet transfer to toilet with Stand-by Assistance.                         Time Tracking:     OT Date of Treatment: 08/28/23  OT Start Time: 0906  OT Stop Time: 0922  OT Total Time (min): 16 min    Billable Minutes:Therapeutic Activity 16    OT/DIMITRY: OT          8/28/2023

## 2023-08-28 NOTE — NURSING
Pt AAO x 4, pt had c/o pain and  N/V this shift PRN medication given with moderate relief. Pt continues on telemetry monitoring with HR at NSR. PIV's clean, dry, intaact, no redness or swelling, flushed and saline locked. Catheter put out sanford colored urine, no c/o discomfort at catheter insertion site. Turned as ordered this shift. Pt blood sugars WNL this shift. Pt did not have much of an appetite this shift. Family at bedside. Bed low and locked, call light in reach.

## 2023-08-28 NOTE — PLAN OF CARE
SW met with patient in regard to Snf placement and she reports that she will need to speak to her spouse in regard to SNF.           Marya Dunne LMSW  [facility]   [phone].

## 2023-08-28 NOTE — PLAN OF CARE
Problem: Diabetes Comorbidity  Goal: Blood Glucose Level Within Targeted Range  Outcome: Ongoing, Progressing     Problem: Fluid and Electrolyte Imbalance (Acute Kidney Injury/Impairment)  Goal: Fluid and Electrolyte Balance  Outcome: Ongoing, Progressing     Problem: Oral Intake Inadequate (Acute Kidney Injury/Impairment)  Goal: Optimal Nutrition Intake  Outcome: Ongoing, Progressing     Problem: Renal Function Impairment (Acute Kidney Injury/Impairment)  Goal: Effective Renal Function  Outcome: Ongoing, Progressing     Problem: Adult Inpatient Plan of Care  Goal: Plan of Care Review  Outcome: Ongoing, Progressing  Goal: Patient-Specific Goal (Individualized)  Outcome: Ongoing, Progressing  Goal: Absence of Hospital-Acquired Illness or Injury  Outcome: Ongoing, Progressing  Goal: Optimal Comfort and Wellbeing  Outcome: Ongoing, Progressing  Goal: Readiness for Transition of Care  Outcome: Ongoing, Progressing     Problem: Fall Injury Risk  Goal: Absence of Fall and Fall-Related Injury  Outcome: Ongoing, Progressing     Problem: Skin Injury Risk Increased  Goal: Skin Health and Integrity  Outcome: Ongoing, Progressing     Problem: Adjustment to Illness (Sepsis/Septic Shock)  Goal: Optimal Coping  Outcome: Ongoing, Progressing     Problem: Bleeding (Sepsis/Septic Shock)  Goal: Absence of Bleeding  Outcome: Ongoing, Progressing     Problem: Glycemic Control Impaired (Sepsis/Septic Shock)  Goal: Blood Glucose Level Within Desired Range  Outcome: Ongoing, Progressing     Problem: Infection Progression (Sepsis/Septic Shock)  Goal: Absence of Infection Signs and Symptoms  Outcome: Ongoing, Progressing     Problem: Nutrition Impaired (Sepsis/Septic Shock)  Goal: Optimal Nutrition Intake  Outcome: Ongoing, Progressing     Problem: Infection  Goal: Absence of Infection Signs and Symptoms  Outcome: Ongoing, Progressing

## 2023-08-28 NOTE — ASSESSMENT & PLAN NOTE
Presumed Cholangitis  Developed septic shock on 8/22 after ERCP, some notes indicate pus seen during stent exchange, she then became hypotensive and required MICU admission for levophed. She had a troponin elevation, thought to be demand ischemia from hypotension in setting of recent STEMI. She was on vanc/cefepime and transferred to  on 8/25, she was noted to have new spastic movements, cefipime changed to cipro/flagyl due to high vanc trough to avoid nephrotoxicity with zosyn. WBC improving, still nauseous  - Vanc/cefepime/flagyl - plan to continue until at least 8/30 per GI recs  - Follow up bcx, NGTD, last done 8/25 for fever  - Continued nausea

## 2023-08-29 LAB
ALBUMIN SERPL BCP-MCNC: 1.2 G/DL (ref 3.5–5.2)
ALP SERPL-CCNC: 607 U/L (ref 55–135)
ALT SERPL W/O P-5'-P-CCNC: 29 U/L (ref 10–44)
ANION GAP SERPL CALC-SCNC: 8 MMOL/L (ref 8–16)
APTT PPP: 49.8 SEC (ref 21–32)
AST SERPL-CCNC: 62 U/L (ref 10–40)
BASOPHILS # BLD AUTO: 0.02 K/UL (ref 0–0.2)
BASOPHILS NFR BLD: 0.2 % (ref 0–1.9)
BILIRUB SERPL-MCNC: 2.1 MG/DL (ref 0.1–1)
BUN SERPL-MCNC: 14 MG/DL (ref 8–23)
CALCIUM SERPL-MCNC: 8.1 MG/DL (ref 8.7–10.5)
CHLORIDE SERPL-SCNC: 107 MMOL/L (ref 95–110)
CO2 SERPL-SCNC: 21 MMOL/L (ref 23–29)
CREAT SERPL-MCNC: 1 MG/DL (ref 0.5–1.4)
DIFFERENTIAL METHOD: ABNORMAL
EOSINOPHIL # BLD AUTO: 0.2 K/UL (ref 0–0.5)
EOSINOPHIL NFR BLD: 1.4 % (ref 0–8)
ERYTHROCYTE [DISTWIDTH] IN BLOOD BY AUTOMATED COUNT: 19.2 % (ref 11.5–14.5)
EST. GFR  (NO RACE VARIABLE): >60 ML/MIN/1.73 M^2
GLUCOSE SERPL-MCNC: 74 MG/DL (ref 70–110)
HCT VFR BLD AUTO: 26.9 % (ref 37–48.5)
HGB BLD-MCNC: 8.3 G/DL (ref 12–16)
IMM GRANULOCYTES # BLD AUTO: 0.13 K/UL (ref 0–0.04)
IMM GRANULOCYTES NFR BLD AUTO: 1 % (ref 0–0.5)
INR PPP: 1.4 (ref 0.8–1.2)
LYMPHOCYTES # BLD AUTO: 1.3 K/UL (ref 1–4.8)
LYMPHOCYTES NFR BLD: 10.1 % (ref 18–48)
MAGNESIUM SERPL-MCNC: 2.1 MG/DL (ref 1.6–2.6)
MCH RBC QN AUTO: 25.9 PG (ref 27–31)
MCHC RBC AUTO-ENTMCNC: 30.9 G/DL (ref 32–36)
MCV RBC AUTO: 84 FL (ref 82–98)
MONOCYTES # BLD AUTO: 0.6 K/UL (ref 0.3–1)
MONOCYTES NFR BLD: 5.1 % (ref 4–15)
NEUTROPHILS # BLD AUTO: 10.3 K/UL (ref 1.8–7.7)
NEUTROPHILS NFR BLD: 82.2 % (ref 38–73)
NRBC BLD-RTO: 0 /100 WBC
PLATELET # BLD AUTO: 190 K/UL (ref 150–450)
PMV BLD AUTO: 11.9 FL (ref 9.2–12.9)
POCT GLUCOSE: 121 MG/DL (ref 70–110)
POCT GLUCOSE: 80 MG/DL (ref 70–110)
POCT GLUCOSE: 81 MG/DL (ref 70–110)
POCT GLUCOSE: 84 MG/DL (ref 70–110)
POCT GLUCOSE: 97 MG/DL (ref 70–110)
POTASSIUM SERPL-SCNC: 3.3 MMOL/L (ref 3.5–5.1)
PROT SERPL-MCNC: 6.1 G/DL (ref 6–8.4)
PROTHROMBIN TIME: 15.1 SEC (ref 9–12.5)
RBC # BLD AUTO: 3.21 M/UL (ref 4–5.4)
SODIUM SERPL-SCNC: 136 MMOL/L (ref 136–145)
WBC # BLD AUTO: 12.49 K/UL (ref 3.9–12.7)

## 2023-08-29 PROCEDURE — 25000003 PHARM REV CODE 250: Performed by: STUDENT IN AN ORGANIZED HEALTH CARE EDUCATION/TRAINING PROGRAM

## 2023-08-29 PROCEDURE — 85610 PROTHROMBIN TIME: CPT | Performed by: STUDENT IN AN ORGANIZED HEALTH CARE EDUCATION/TRAINING PROGRAM

## 2023-08-29 PROCEDURE — 99233 SBSQ HOSP IP/OBS HIGH 50: CPT | Mod: ,,, | Performed by: STUDENT IN AN ORGANIZED HEALTH CARE EDUCATION/TRAINING PROGRAM

## 2023-08-29 PROCEDURE — 97116 GAIT TRAINING THERAPY: CPT | Mod: CQ

## 2023-08-29 PROCEDURE — 85730 THROMBOPLASTIN TIME PARTIAL: CPT | Performed by: INTERNAL MEDICINE

## 2023-08-29 PROCEDURE — 63600175 PHARM REV CODE 636 W HCPCS: Performed by: STUDENT IN AN ORGANIZED HEALTH CARE EDUCATION/TRAINING PROGRAM

## 2023-08-29 PROCEDURE — 83735 ASSAY OF MAGNESIUM: CPT | Performed by: STUDENT IN AN ORGANIZED HEALTH CARE EDUCATION/TRAINING PROGRAM

## 2023-08-29 PROCEDURE — 20600001 HC STEP DOWN PRIVATE ROOM

## 2023-08-29 PROCEDURE — 99233 PR SUBSEQUENT HOSPITAL CARE,LEVL III: ICD-10-PCS | Mod: ,,, | Performed by: STUDENT IN AN ORGANIZED HEALTH CARE EDUCATION/TRAINING PROGRAM

## 2023-08-29 PROCEDURE — 85025 COMPLETE CBC W/AUTO DIFF WBC: CPT | Performed by: STUDENT IN AN ORGANIZED HEALTH CARE EDUCATION/TRAINING PROGRAM

## 2023-08-29 PROCEDURE — 80053 COMPREHEN METABOLIC PANEL: CPT

## 2023-08-29 PROCEDURE — 25000003 PHARM REV CODE 250: Performed by: INTERNAL MEDICINE

## 2023-08-29 PROCEDURE — 97530 THERAPEUTIC ACTIVITIES: CPT | Mod: CQ

## 2023-08-29 PROCEDURE — 63600175 PHARM REV CODE 636 W HCPCS

## 2023-08-29 RX ORDER — BENZONATATE 100 MG/1
100 CAPSULE ORAL 3 TIMES DAILY PRN
Status: DISCONTINUED | OUTPATIENT
Start: 2023-08-29 | End: 2023-09-01 | Stop reason: HOSPADM

## 2023-08-29 RX ORDER — SCOLOPAMINE TRANSDERMAL SYSTEM 1 MG/1
1 PATCH, EXTENDED RELEASE TRANSDERMAL
Status: DISCONTINUED | OUTPATIENT
Start: 2023-08-29 | End: 2023-09-01 | Stop reason: HOSPADM

## 2023-08-29 RX ORDER — POTASSIUM CHLORIDE 7.45 MG/ML
10 INJECTION INTRAVENOUS
Status: COMPLETED | OUTPATIENT
Start: 2023-08-29 | End: 2023-08-29

## 2023-08-29 RX ORDER — ONDANSETRON 4 MG/1
4 TABLET, FILM COATED ORAL EVERY 8 HOURS
Status: DISCONTINUED | OUTPATIENT
Start: 2023-08-29 | End: 2023-08-30

## 2023-08-29 RX ADMIN — POTASSIUM CHLORIDE 10 MEQ: 7.46 INJECTION, SOLUTION INTRAVENOUS at 02:08

## 2023-08-29 RX ADMIN — PROMETHAZINE HYDROCHLORIDE 12.5 MG: 12.5 TABLET ORAL at 09:08

## 2023-08-29 RX ADMIN — ONDANSETRON 4 MG: 4 TABLET ORAL at 03:08

## 2023-08-29 RX ADMIN — SENNOSIDES 8.6 MG: 8.6 TABLET, FILM COATED ORAL at 09:08

## 2023-08-29 RX ADMIN — SCOPALAMINE 1 PATCH: 1 PATCH, EXTENDED RELEASE TRANSDERMAL at 09:08

## 2023-08-29 RX ADMIN — CIPROFLOXACIN 400 MG: 2 INJECTION, SOLUTION INTRAVENOUS at 10:08

## 2023-08-29 RX ADMIN — CLOPIDOGREL BISULFATE 75 MG: 75 TABLET ORAL at 09:08

## 2023-08-29 RX ADMIN — ESCITALOPRAM OXALATE 10 MG: 10 TABLET ORAL at 09:08

## 2023-08-29 RX ADMIN — ATORVASTATIN CALCIUM 40 MG: 40 TABLET, FILM COATED ORAL at 08:08

## 2023-08-29 RX ADMIN — CIPROFLOXACIN 400 MG: 2 INJECTION, SOLUTION INTRAVENOUS at 12:08

## 2023-08-29 RX ADMIN — METRONIDAZOLE 500 MG: 500 INJECTION, SOLUTION INTRAVENOUS at 02:08

## 2023-08-29 RX ADMIN — ENOXAPARIN SODIUM 40 MG: 40 INJECTION SUBCUTANEOUS at 04:08

## 2023-08-29 RX ADMIN — Medication 9 MG: at 08:08

## 2023-08-29 RX ADMIN — ASPIRIN 81 MG: 81 TABLET, COATED ORAL at 09:08

## 2023-08-29 RX ADMIN — ONDANSETRON 4 MG: 4 TABLET ORAL at 10:08

## 2023-08-29 RX ADMIN — METRONIDAZOLE 500 MG: 500 INJECTION, SOLUTION INTRAVENOUS at 11:08

## 2023-08-29 RX ADMIN — POTASSIUM CHLORIDE 10 MEQ: 7.46 INJECTION, SOLUTION INTRAVENOUS at 01:08

## 2023-08-29 RX ADMIN — POLYETHYLENE GLYCOL 3350 17 G: 17 POWDER, FOR SOLUTION ORAL at 09:08

## 2023-08-29 RX ADMIN — POTASSIUM CHLORIDE 10 MEQ: 7.46 INJECTION, SOLUTION INTRAVENOUS at 12:08

## 2023-08-29 RX ADMIN — POTASSIUM CHLORIDE 10 MEQ: 7.46 INJECTION, SOLUTION INTRAVENOUS at 04:08

## 2023-08-29 RX ADMIN — METRONIDAZOLE 500 MG: 500 INJECTION, SOLUTION INTRAVENOUS at 08:08

## 2023-08-29 NOTE — PROGRESS NOTES
[facility] ([address] Medicine  Progress Note    Patient Name: Mariann Huff  MRN: [id number]  Patient Class: IP- Inpatient   Admission Date: 8/14/2023  Length of Stay: 15 days  Attending Physician: Bolivar Shi*  Primary Care Provider: Jasbir Haney MD        Subjective:     Principal Problem:Shock        HPI:  63 y.o. female with CAD s/p GINGER to ostial LAD in 2014, HTN, HLD, DM2, MURTAZA who presented to the ED to the ER after a near syncopal event at home around 4 pm. She also feels tired and her appetite lately has decreased. she had chest pain and visceral symptoms with nausea and vomiting. She reports losing weight as well.She has had abdominal symptoms with poor PO intake leading to orthostasis. Today she had another near syncopal event and is why she was brought to the ER. Here her ECG showed the posterior changes for which posterior EKG was obtained with showed STEMI. Bedside echo showed an EF 45-50% and norma-lateral and infero-lateral hypokinesis. She was taken to cath lab and her symptoms resolved after PCI. Please refer to PCI for full details.       Overview/Hospital Course:  Patient was taken to cath lab and her symptoms resolved after PCI. Patient chest pain resolved.     Patient reported development of acute abdominal pain associated with nausea and vomiting. Patient was recently admitted (6/23) for suspected biliary pancreatitis and biliary stricture treated with biliary sphincterotomy, biliary stent, and cholecystectomy.    Patient now has worsening transaminitis w/ elevated T-Bili.   Concern for stent occlusion. Patient amylase 107 wnl, and Lipase 61. Workup will include total abdominal ultrasound and [facility] was consulted . Was scheduled for outpatient w/u of hepatic lesions found on previous admission 8/2/23.  Patient had ERCP and EUS on 8/17/23. Visualized a partial occluded stent in the biliary tree. One covered metal stent was  placed into the common bile duct. Concern for metastatic cancer is high due to multiple liver lesions and mass on pancreatic head per [facility]. Initially on previous hospital admission, it was believed to more likely be liver abscesses rather than malignancy due to acute nature of progression. S/p FNA biopsy with ERCP and stent placement on 8/17.     Became hypoglycemic on 8/19 and Hgb dropped from 8 to 6.3 with a reported dark stool, Improved to 7.1 after 1 unit PRBC, could not get a second unit due to respiratory distress, no further episodes of melena. Seen by GI, did not recommend endoscopy at this time for possible GIB.  Given lasix 20mg IV then 40mg IV with good output and improvement in respiratory status.    T bili continued to rise on 8/21, KUB did not show stent migration, patient underwent ERCP again on 8/22 which demonstrated a visibly ocluded stent in the biliary tree with a single biliary stricture in main BD. One covered metal stent placed in CBD. Several hours following this procedure, patient developed rapid onset fever to 103 and rapid drop in BP from SBP 180s to SBP 80s. Patient admitted to MICU for further management. She was continued on vanc/cefepime, briefly on levophed and then weaned off. Still with intermittent AMS and elevateed WBC, febrile 8/25. Adjusted abx to vanc/cipro/flagyl with improvement in mentation. Oral intake still poor, PAPA with Cr 1.4 on 8/26, improved with IVF. Still nauseous      Interval History: Still with nausea, some emesis this am  Started scopolamine patch  Schedule zofran  Obtain daily ekg for qtc monitoring    Review of Systems  Objective:     Vital Signs (Most Recent):  Temp: 98.4 °F (36.9 °C) (08/29/23 1145)  Pulse: 79 (08/29/23 1145)  Resp: 20 (08/29/23 1145)  BP: 136/71 (08/29/23 1145)  SpO2: 95 % (08/29/23 1145) Vital Signs (24h Range):  Temp:  [97.9 °F (36.6 °C)-98.4 °F (36.9 °C)] 98.4 °F (36.9 °C)  Pulse:  [71-79] 79  Resp:  [17-25] 20  SpO2:  [95 %-99 %] 95  %  BP: (120-153)/(66-79) 136/71     Weight: 62.1 kg (136 lb 14.5 oz)  Body mass index is 23.5 kg/m².    Intake/Output Summary (Last 24 hours) at 8/29/2023 1337  Last data filed at 8/29/2023 1137  Gross per 24 hour   Intake 120 ml   Output 650 ml   Net -530 ml         Physical Exam  Constitutional:       General: She is not in acute distress.     Appearance: Normal appearance. She is ill-appearing. She is not toxic-appearing or diaphoretic.   Cardiovascular:      Rate and Rhythm: Normal rate and regular rhythm.      Heart sounds: No murmur heard.     No friction rub. No gallop.   Pulmonary:      Effort: Pulmonary effort is normal. No respiratory distress.      Breath sounds: Normal breath sounds. No wheezing or rales.   Abdominal:      General: Abdomen is flat. There is no distension.      Palpations: Abdomen is soft.      Tenderness: There is no abdominal tenderness. There is no guarding or rebound.   Musculoskeletal:      Right lower leg: No edema.      Left lower leg: No edema.   Neurological:      Mental Status: She is alert.             Significant Labs: All pertinent labs within the past 24 hours have been reviewed.    Significant Imaging: I have reviewed all pertinent imaging results/findings within the past 24 hours.      Assessment/Plan:      * Shock  Presumed Cholangitis  Developed septic shock on 8/22 after ERCP, some notes indicate pus seen during stent exchange, she then became hypotensive and required MICU admission for levophed. She had a troponin elevation, thought to be demand ischemia from hypotension in setting of recent STEMI. She was on vanc/cefepime and transferred to  on 8/25, she was noted to have new spastic movements, cefipime changed to cipro/flagyl due to high vanc trough to avoid nephrotoxicity with zosyn. WBC improving, still nauseous  - cipro/flagyl - plan to continue until at least 8/30 per GI recs  - Follow up bcx, NGTD, last done 8/25 for fever  - Continued  nausea      Nausea  Difficult to control and leading to poor PO intake, has not had BM in several days, will put in a bowel regimen, and continue to treat cholangitis.  Scopolamine patch  Scheduled zofran  Daily ekg qtc monitoring      Cholangitis        Pancreatic cancer  Diagnosed this admission, will need follow up with oncology.       Atrial fibrillation  Patient with Paroxysmal (<7 days) atrial fibrillation which is controlled currently with Beta Blocker. Patient is currently in sinus rhythm.[id number] Score: 3. . Anticoagulation not indicated due to possible bleeding, was previously on lovenox but it is being held.    Biliary obstruction  Patient reports development of acute abdominal pain associated with nausea and vomiting. Patient was recently admitted (6/23) for suspected biliary pancreatitis and biliary stricture treated with biliary sphincterotomy, biliary stent, and cholecystectomy s/p ERCP w/ metal stent on 8/17, abdominal pain has resolved but T bili is still rising     - Trend T bili daily  -[facility] consulted, s/p ERCP w/ biopsy on 8/17, transaminases and alk phos improving but bili is worsening  -S/p repeat ERCP 8/22 w/ septic shock from presume cholangitis, see septic shock above  - LFTs improving       CHF (congestive heart failure)  -On week prior to admission had CXR with B/L edema, , EF decreased to 40-45%  -Got IV lasix while inpatient and was discharged on 40 po daily however became dehydrated and weak and LVEDP -in the lab showed LVEDP 9, reduce dose to 20 po  - Holding all GDMT in setting of recent shock, will resume once infection is better controlled  - Holding lasix for now    TTE 8/15/23    Left Ventricle: The left ventricle is normal in size. Ventricular mass is normal. Normal wall thickness. regional wall motion abnormalities present. See diagram for wall motion findings. There is moderately reduced systolic function. Ejection fraction by visual approximation is 40%. Grade II  diastolic dysfunction.    Left Atrium: Left atrium is mildly dilated. The left atrium volume index is 35.5 mL/m2.    Right Ventricle: Normal right ventricular cavity size. Wall thickness is normal. Right ventricle wall motion  is normal. Systolic function is normal.    Mitral Valve: There is mild regurgitation.    IVC/SVC: Normal venous pressure at 3 mmHg.    Liver parenchyma is inhomgenous.  Clinical correlation is required.      STEMI (ST elevation myocardial infarction)  -Started having near syncope around 4 pm on the day of admission  -Bedside echo in the ER showed an EF 45-50% and norma-lateral and infero-lateral hypokinesis  -She underwent Successful primary PCI of ramus intermedius and lateral branch with Pronto thrombectomy and 3X16 GINGER     Plan  - Continue aspirin 81 mg daily, stop after 1 month to avoid triple therapy  - Loaded with brilinta 180, loaded with plavix 600 8/15/23, and 75 qd there after  - Continue high intensity statin therapy (LDL goal < 70)  - TTE shows ejection fraction of about 40% associated with grade II diastolic dysfunction.  - Hold lasix for now  - Continue ASA/plavix  - Developed troponin elevation in setting of septic shock as well, seen by cardiology, TTE unchanged, no need for repeat LHC      Other specified anemias  Had notable hemoglobin drop, c/f GIB, however stools turned brown by time of GI evaluation, held lovenox for afib, still holding lovenox, may resume if hgb continues to be stable. Received 2 units PRBC this admission  - Daily CBC  - Hgb goal >8 due to recent STEMI  - Hold lovenox for now      NSTEMI (non-ST elevated myocardial infarction)  During septic shock, see STEMI above for further discussion      History of pancreatitis  Recent (6/2023) suspected biliary pancreatitis and biliary stricture treated with biliary sphincterotomy, biliary stent, and cholecystectomy.  CT A/P with new innumerable hepatic lesions:  1. Innumerable low attenuating hepatic lesions,  new since the previous exam.  Malignancy remains a concern however given short-term development, correlation is needed to exclude multifocal infection/abscess in this patient status post bile duct stent placement and cholecystectomy.  Please note, there is a low attenuating focus within the gallbladder fossa, similar to the foci throughout the hepatic parenchyma..  2. Pancreatic ductal dilatation up to 4 mm, minimally increased compared to prior.  There is fullness of the pancreatic head, underlying mass is not excluded, correlation with recent ERCP advised.  3. Simple and complex bilateral renal cysts.  4. Biliary stent in place with expected pneumobilia.  5. Cholecystectomy with scattered fluid about the gallbladder fossa.  6. Moderate stool throughout the colon, may reflect developing constipation.  7.  Additional findings as above.     - concern for lesions seen on CT A/P, underwent biopsy, now confirm pancreatic CA  - Patient was found to have a hypoechoic pancreatic mass on abdominal US  - Was also found to have this mass on EUS concerning for malignancy   - EUS and ERCP 8/17/23 and then again on 8/22    Altered mental status  Fluctuating mentation since 8/22, at times forgetful and slow to respond, AAOx3 but not aware of situation, ammonia WNL, calcium normal, CT head w/ contrast without evidence of mets or abnormality. Improved on 8/26.      Type 2 diabetes mellitus with stage 2 chronic kidney disease and hypertension  Home Antihyperglycemic Regiment:  -Farxiga, glipizide, insulin daily at home  - Titrate and addition of insulin as needed for BG goal 140-180  - SSI with POCT accuchecks ACHS and Diabetic diet 2000 beti  - Diabetic nutritional counseling given   - Continue home gabapentin for diabetic peripheral neuropathy  - Appreciate Endocrinology recs  - Adjust insulin and monitor closely  - Endocrine following    PAPA (acute kidney injury)  Resolved  Patient with acute kidney injury/acute renal failure  likely due to pre-renal azotemia due to dehydration PAPA is currently improving. Baseline creatinine 0.9 - Labs reviewed- Renal function/electrolytes with Estimated Creatinine Clearance: 45.8 mL/min (based on SCr of 1.1 mg/dL). according to latest data. Monitor urine output and serial BMP and adjust therapy as needed. Avoid nephrotoxins and renally dose meds for GFR listed above.  - Improved with IVF    VTE Risk Mitigation (From admission, onward)         Ordered     enoxaparin injection 40 mg  Every 24 hours         08/23/23 1026                Discharge Planning   REBECCA: 8/29/2023     Code Status: Full Code   Is the patient medically ready for discharge?: No    Reason for patient still in hospital (select all that apply): Patient trending condition and Treatment  Discharge Plan A: Home Health   Discharge Delays: None known at this time      Bolivar Shi MD  Department of Hospital Medicine   [facility] - Intensive Care ([place])

## 2023-08-29 NOTE — CARE UPDATE
BG goal 140-180    -A1C   Lab Results   Component Value Date    HGBA1C 8.8 (H) 07/10/2023       -HOME REGIMEN: Glipizide ER 10mg before breakfast. Levemir 2u daily on a sliding scale     -INPATIENT REGIMEN: Novolog Correction Scale     -GLUCOSE TREND FOR THE PAST 24HRS: 110-204    -NO HYPOGYCEMIAS NOTED     -TOLERATING <25% OF PO DIET     -Diet: Diet diabetic Low Sodium,2gm; 2000 Calorie; Fluid - 2000mL      -Steroids -  N/A     -Tube Feeds - N/A       Remains in CSU. NAEON. BG  well controlled today; N/V today so did not get prandial insulin.       Plan:  - Continue Moderate Dose Correction Scale  - BG monitoring ac/hs    Discharge planning: TBD    Endocrine to continue to follow    ** Please call Endocrine for any BG related issues **

## 2023-08-29 NOTE — NURSING
Pt c/o pain and burning with 4th potassium IV replacement. Attempted to place infusion in second IV, pt still c/o burning and discomfort. Infusion stopped and pt refused the rest of infusion. MD notified, no new orders at this time. Both PIV sites clean, dry, intact, no redness or swelling at either site, both are flushed and saline locked. Monitoring

## 2023-08-29 NOTE — NURSING
Pt AAO x 4, no c/o pain this shift. Pt c/o intermittent N/V this shift with mild relief from meds.  Pt continues on RA no SOB /distress noted. Pt continues with HR at NSR. Fontenot catheter remains in place to gravity with no c/o discomfort. Bed low and locked, call light in reach.

## 2023-08-29 NOTE — PLAN OF CARE
Patient expected to dc home with HH when stable to dc.       Marya Dunne LMSW  [facility]   [id number]

## 2023-08-29 NOTE — ASSESSMENT & PLAN NOTE
Difficult to control and leading to poor PO intake, has not had BM in several days, will put in a bowel regimen, and continue to treat cholangitis.  Scopolamine patch  Scheduled zofran  Daily ekg qtc monitoring

## 2023-08-29 NOTE — PLAN OF CARE
Problem: Diabetes Comorbidity  Goal: Blood Glucose Level Within Targeted Range  Outcome: Ongoing, Progressing     Problem: Fluid and Electrolyte Imbalance (Acute Kidney Injury/Impairment)  Goal: Fluid and Electrolyte Balance  Outcome: Ongoing, Progressing     Problem: Oral Intake Inadequate (Acute Kidney Injury/Impairment)  Goal: Optimal Nutrition Intake  Outcome: Ongoing, Progressing     Problem: Renal Function Impairment (Acute Kidney Injury/Impairment)  Goal: Effective Renal Function  Outcome: Ongoing, Progressing     Problem: Adult Inpatient Plan of Care  Goal: Plan of Care Review  Outcome: Ongoing, Progressing  Goal: Patient-Specific Goal (Individualized)  Outcome: Ongoing, Progressing  Goal: Optimal Comfort and Wellbeing  Outcome: Ongoing, Progressing  Goal: Readiness for Transition of Care  Outcome: Ongoing, Progressing     Problem: Fall Injury Risk  Goal: Absence of Fall and Fall-Related Injury  Outcome: Ongoing, Progressing     Problem: Skin Injury Risk Increased  Goal: Skin Health and Integrity  Outcome: Ongoing, Progressing     Problem: Glycemic Control Impaired (Sepsis/Septic Shock)  Goal: Blood Glucose Level Within Desired Range  Outcome: Ongoing, Progressing     Problem: Infection  Goal: Absence of Infection Signs and Symptoms  Outcome: Ongoing, Progressing

## 2023-08-29 NOTE — NURSING
Patient had nausea, but no vomiting. Not many complaints tonight. Scheduled to d/c after abx on 8/30. Will continue to monitor patient until shift change.

## 2023-08-29 NOTE — ASSESSMENT & PLAN NOTE
Presumed Cholangitis  Developed septic shock on 8/22 after ERCP, some notes indicate pus seen during stent exchange, she then became hypotensive and required MICU admission for levophed. She had a troponin elevation, thought to be demand ischemia from hypotension in setting of recent STEMI. She was on vanc/cefepime and transferred to  on 8/25, she was noted to have new spastic movements, cefipime changed to cipro/flagyl due to high vanc trough to avoid nephrotoxicity with zosyn. WBC improving, still nauseous  - cipro/flagyl - plan to continue until at least 8/30 per GI recs  - Follow up bcx, NGTD, last done 8/25 for fever  - Continued nausea

## 2023-08-29 NOTE — PT/OT/SLP PROGRESS
"Physical Therapy Treatment    Patient Name:  Mariann Huff   MRN:  [id number]    Recommendations:     Discharge Recommendations: nursing facility, skilled  Discharge Equipment Recommendations: walker, rolling  Barriers to discharge: Decreased caregiver support    Assessment:     Mariann Huff is a 63 y.o. female admitted with a medical diagnosis of Shock.  She presents with the following impairments/functional limitations: weakness, impaired endurance, impaired functional mobility, gait instability, decreased safety awareness, pain, decreased upper extremity function, decreased coordination.  Pt required increase encouragement to participate today. Pt completed bed mobility, stand pivot, and short gait today. Increase time assisting pt with toileting activities. Pt continues to benefit from therapy to improve functional endurance, strength, and mobility.     Rehab Prognosis: Good; patient would benefit from acute skilled PT services to address these deficits and reach maximum level of function.    Recent Surgery: Procedure(s) (LRB):  ERCP (ENDOSCOPIC RETROGRADE CHOLANGIOPANCREATOGRAPHY) (N/A) 7 Days Post-Op    Plan:     During this hospitalization, patient to be seen 3 x/week to address the identified rehab impairments via gait training, therapeutic activities, therapeutic exercises, neuromuscular re-education, wheelchair management/training and progress toward the following goals:    Plan of Care Expires:  09/14/23    Subjective     Chief Complaint: nausea  Patient/Family Comments/goals: "I'm not feeling great"  Pain/Comfort:  Pain Rating 1:  (none verbalized)  Pain Rating Post-Intervention 1:  (none verbalized)      Objective:     Communicated with RN prior to session.  Patient found HOB elevated with pulse ox (continuous), telemetry, correa catheter upon PT entry to room.     General Precautions: Standard, fall  Orthopedic Precautions: N/A  Braces: N/A  Respiratory Status: Room air     Functional " Mobility:  Additional staff present: N/A  Bed Mobility:   Scooting to EOB: contact guard assistance  Supine to Sit: contact guard assistance; HOB elevated  Transfers:   Sit <> Stand Transfer: minimum assistance and moderate assistance with rolling walker   Bed > Chair Transfer: minimum assistance with hand-held assist using Stand Pivot technique   Toielt Transfer: contact guard assistance with rolling walker using Step Transfer technique   Gait:  Pt ambulated 2x ~15ft with contact guard assistance and rolling walker.  Gait Deviation(s): mostly steady gait with decrease marina, slight flexed posture, and decrease step length   Verbal/tactile cues for keep RW close and upright posture  Balance:   Static/Dynamic sitting EOB balance: CGA  Static standing balance: CGA with RW       AM-PAC 6 CLICK MOBILITY  Turning over in bed (including adjusting bedclothes, sheets and blankets)?: 3  Sitting down on and standing up from a chair with arms (e.g., wheelchair, bedside commode, etc.): 3  Moving from lying on back to sitting on the side of the bed?: 3  Moving to and from a bed to a chair (including a wheelchair)?: 3  Need to walk in hospital room?: 3  Climbing 3-5 steps with a railing?: 2  Basic Mobility Total Score: 17       Treatment & Education:  Pt required increase encouragement to participate in therapy.  Patient educated on role of therapy, goals of session, and benefits of out of bed mobility.   Instructed on use of call button and importance of calling nursing staff for assistance with mobility   Questions/concerns addressed within PTA scope of practice  Pt verbalized understanding.    Patient left up in chair with all lines intact, call button in reach, nurse notified, and spouse present..    GOALS:   Multidisciplinary Problems       Physical Therapy Goals          Problem: Physical Therapy    Goal Priority Disciplines Outcome Goal Variances Interventions   Physical Therapy Goal     PT, PT/OT Ongoing, Progressing      Description: Goals to be met by: 23     Patient will increase functional independence with mobility by performin. Supine to sit with [place]  2. Sit to stand transfer with Supervision  3. Bed to chair transfer with Supervision using LRAD  4. Gait  x 75 feet with Stand-by Assistance using LRAD  5. Ascend/Descend 6 inch curb step with Stand-by Assistance with/without LRAD  6. Lower extremity exercise program x30 reps per handout, with independence                         Time Tracking:     PT Received On: 23  PT Start Time: 1003     PT Stop Time: 1037  PT Total Time (min): 34 min     Billable Minutes: Gait Training 15 and Therapeutic Activity 19    Treatment Type: Treatment  PT/PTA: PTA     Number of PTA visits since last PT visit: 2023

## 2023-08-29 NOTE — SUBJECTIVE & OBJECTIVE
Interval History: Still with nausea, some emesis this am  Started scopolamine patch  Schedule zofran  Obtain daily ekg for qtc monitoring    Review of Systems  Objective:     Vital Signs (Most Recent):  Temp: 98.4 °F (36.9 °C) (08/29/23 1145)  Pulse: 79 (08/29/23 1145)  Resp: 20 (08/29/23 1145)  BP: 136/71 (08/29/23 1145)  SpO2: 95 % (08/29/23 1145) Vital Signs (24h Range):  Temp:  [97.9 °F (36.6 °C)-98.4 °F (36.9 °C)] 98.4 °F (36.9 °C)  Pulse:  [71-79] 79  Resp:  [17-25] 20  SpO2:  [95 %-99 %] 95 %  BP: (120-153)/(66-79) 136/71     Weight: 62.1 kg (136 lb 14.5 oz)  Body mass index is 23.5 kg/m².    Intake/Output Summary (Last 24 hours) at 8/29/2023 1337  Last data filed at 8/29/2023 1137  Gross per 24 hour   Intake 120 ml   Output 650 ml   Net -530 ml         Physical Exam  Constitutional:       General: She is not in acute distress.     Appearance: Normal appearance. She is ill-appearing. She is not toxic-appearing or diaphoretic.   Cardiovascular:      Rate and Rhythm: Normal rate and regular rhythm.      Heart sounds: No murmur heard.     No friction rub. No gallop.   Pulmonary:      Effort: Pulmonary effort is normal. No respiratory distress.      Breath sounds: Normal breath sounds. No wheezing or rales.   Abdominal:      General: Abdomen is flat. There is no distension.      Palpations: Abdomen is soft.      Tenderness: There is no abdominal tenderness. There is no guarding or rebound.   Musculoskeletal:      Right lower leg: No edema.      Left lower leg: No edema.   Neurological:      Mental Status: She is alert.             Significant Labs: All pertinent labs within the past 24 hours have been reviewed.    Significant Imaging: I have reviewed all pertinent imaging results/findings within the past 24 hours.

## 2023-08-29 NOTE — NURSING
Pt AAO x 4, no c/o pain at this time. Pt still c/o N/V with mild relief with PRN medication. PT on RA no SOB / distress at this time. PT remains on telemetry monitoring with HR at NSR with Right bundle branch block. Pt continues with correa catheter to gravity. Pt skin is clean, dry, and intact. Pt PIV is clean, dry, intact, no redness or swelling, flushed and saline locked. Bed low and locked, call light in reach. Monitoring.

## 2023-08-30 LAB
ALBUMIN SERPL BCP-MCNC: 1.3 G/DL (ref 3.5–5.2)
ALP SERPL-CCNC: 681 U/L (ref 55–135)
ALT SERPL W/O P-5'-P-CCNC: 27 U/L (ref 10–44)
ANION GAP SERPL CALC-SCNC: 8 MMOL/L (ref 8–16)
ANISOCYTOSIS BLD QL SMEAR: SLIGHT
APTT PPP: 50.1 SEC (ref 21–32)
AST SERPL-CCNC: 72 U/L (ref 10–40)
BACTERIA BLD CULT: NORMAL
BACTERIA BLD CULT: NORMAL
BASOPHILS # BLD AUTO: 0.02 K/UL (ref 0–0.2)
BASOPHILS NFR BLD: 0.2 % (ref 0–1.9)
BILIRUB SERPL-MCNC: 2 MG/DL (ref 0.1–1)
BUN SERPL-MCNC: 15 MG/DL (ref 8–23)
CALCIUM SERPL-MCNC: 7.9 MG/DL (ref 8.7–10.5)
CHLORIDE SERPL-SCNC: 106 MMOL/L (ref 95–110)
CO2 SERPL-SCNC: 19 MMOL/L (ref 23–29)
CREAT SERPL-MCNC: 1 MG/DL (ref 0.5–1.4)
DIFFERENTIAL METHOD: ABNORMAL
EOSINOPHIL # BLD AUTO: 0.3 K/UL (ref 0–0.5)
EOSINOPHIL NFR BLD: 2.1 % (ref 0–8)
ERYTHROCYTE [DISTWIDTH] IN BLOOD BY AUTOMATED COUNT: 19.6 % (ref 11.5–14.5)
EST. GFR  (NO RACE VARIABLE): >60 ML/MIN/1.73 M^2
GLUCOSE SERPL-MCNC: 192 MG/DL (ref 70–110)
HCT VFR BLD AUTO: 25.8 % (ref 37–48.5)
HGB BLD-MCNC: 8 G/DL (ref 12–16)
IMM GRANULOCYTES # BLD AUTO: 0.11 K/UL (ref 0–0.04)
IMM GRANULOCYTES NFR BLD AUTO: 0.9 % (ref 0–0.5)
INR PPP: 1.3 (ref 0.8–1.2)
LYMPHOCYTES # BLD AUTO: 1.4 K/UL (ref 1–4.8)
LYMPHOCYTES NFR BLD: 11.7 % (ref 18–48)
MAGNESIUM SERPL-MCNC: 2.1 MG/DL (ref 1.6–2.6)
MCH RBC QN AUTO: 25.6 PG (ref 27–31)
MCHC RBC AUTO-ENTMCNC: 31 G/DL (ref 32–36)
MCV RBC AUTO: 82 FL (ref 82–98)
MONOCYTES # BLD AUTO: 0.7 K/UL (ref 0.3–1)
MONOCYTES NFR BLD: 5.6 % (ref 4–15)
NEUTROPHILS # BLD AUTO: 9.6 K/UL (ref 1.8–7.7)
NEUTROPHILS NFR BLD: 79.5 % (ref 38–73)
NRBC BLD-RTO: 0 /100 WBC
PLATELET # BLD AUTO: 204 K/UL (ref 150–450)
PLATELET BLD QL SMEAR: ABNORMAL
PMV BLD AUTO: 12.2 FL (ref 9.2–12.9)
POCT GLUCOSE: 165 MG/DL (ref 70–110)
POCT GLUCOSE: 171 MG/DL (ref 70–110)
POCT GLUCOSE: 175 MG/DL (ref 70–110)
POCT GLUCOSE: 193 MG/DL (ref 70–110)
POIKILOCYTOSIS BLD QL SMEAR: SLIGHT
POTASSIUM SERPL-SCNC: 3.5 MMOL/L (ref 3.5–5.1)
PROT SERPL-MCNC: 6.3 G/DL (ref 6–8.4)
PROTHROMBIN TIME: 14.2 SEC (ref 9–12.5)
RBC # BLD AUTO: 3.13 M/UL (ref 4–5.4)
SODIUM SERPL-SCNC: 133 MMOL/L (ref 136–145)
WBC # BLD AUTO: 12.07 K/UL (ref 3.9–12.7)

## 2023-08-30 PROCEDURE — 83735 ASSAY OF MAGNESIUM: CPT | Performed by: STUDENT IN AN ORGANIZED HEALTH CARE EDUCATION/TRAINING PROGRAM

## 2023-08-30 PROCEDURE — 36415 COLL VENOUS BLD VENIPUNCTURE: CPT | Performed by: INTERNAL MEDICINE

## 2023-08-30 PROCEDURE — 85610 PROTHROMBIN TIME: CPT | Performed by: STUDENT IN AN ORGANIZED HEALTH CARE EDUCATION/TRAINING PROGRAM

## 2023-08-30 PROCEDURE — 63600175 PHARM REV CODE 636 W HCPCS: Performed by: STUDENT IN AN ORGANIZED HEALTH CARE EDUCATION/TRAINING PROGRAM

## 2023-08-30 PROCEDURE — 20600001 HC STEP DOWN PRIVATE ROOM

## 2023-08-30 PROCEDURE — 25000003 PHARM REV CODE 250: Performed by: STUDENT IN AN ORGANIZED HEALTH CARE EDUCATION/TRAINING PROGRAM

## 2023-08-30 PROCEDURE — 97530 THERAPEUTIC ACTIVITIES: CPT | Mod: CO

## 2023-08-30 PROCEDURE — 93010 ELECTROCARDIOGRAM REPORT: CPT | Mod: ,,, | Performed by: INTERNAL MEDICINE

## 2023-08-30 PROCEDURE — 99233 PR SUBSEQUENT HOSPITAL CARE,LEVL III: ICD-10-PCS | Mod: ,,, | Performed by: STUDENT IN AN ORGANIZED HEALTH CARE EDUCATION/TRAINING PROGRAM

## 2023-08-30 PROCEDURE — 85025 COMPLETE CBC W/AUTO DIFF WBC: CPT | Performed by: STUDENT IN AN ORGANIZED HEALTH CARE EDUCATION/TRAINING PROGRAM

## 2023-08-30 PROCEDURE — 99233 SBSQ HOSP IP/OBS HIGH 50: CPT | Mod: ,,, | Performed by: STUDENT IN AN ORGANIZED HEALTH CARE EDUCATION/TRAINING PROGRAM

## 2023-08-30 PROCEDURE — 93010 EKG 12-LEAD: ICD-10-PCS | Mod: ,,, | Performed by: INTERNAL MEDICINE

## 2023-08-30 PROCEDURE — 80053 COMPREHEN METABOLIC PANEL: CPT

## 2023-08-30 PROCEDURE — 93005 ELECTROCARDIOGRAM TRACING: CPT

## 2023-08-30 PROCEDURE — 25000003 PHARM REV CODE 250: Performed by: PHYSICIAN ASSISTANT

## 2023-08-30 PROCEDURE — 85730 THROMBOPLASTIN TIME PARTIAL: CPT | Performed by: INTERNAL MEDICINE

## 2023-08-30 PROCEDURE — 51798 US URINE CAPACITY MEASURE: CPT

## 2023-08-30 PROCEDURE — 25000003 PHARM REV CODE 250: Performed by: INTERNAL MEDICINE

## 2023-08-30 RX ORDER — ALUMINUM HYDROXIDE, MAGNESIUM HYDROXIDE, AND SIMETHICONE 2400; 240; 2400 MG/30ML; MG/30ML; MG/30ML
30 SUSPENSION ORAL EVERY 6 HOURS PRN
Status: DISCONTINUED | OUTPATIENT
Start: 2023-08-30 | End: 2023-09-01 | Stop reason: HOSPADM

## 2023-08-30 RX ORDER — SODIUM CHLORIDE, SODIUM LACTATE, POTASSIUM CHLORIDE, CALCIUM CHLORIDE 600; 310; 30; 20 MG/100ML; MG/100ML; MG/100ML; MG/100ML
INJECTION, SOLUTION INTRAVENOUS CONTINUOUS
Status: ACTIVE | OUTPATIENT
Start: 2023-08-30 | End: 2023-08-31

## 2023-08-30 RX ORDER — PROMETHAZINE HYDROCHLORIDE 12.5 MG/1
12.5 TABLET ORAL EVERY 8 HOURS
Status: DISCONTINUED | OUTPATIENT
Start: 2023-08-30 | End: 2023-09-01 | Stop reason: HOSPADM

## 2023-08-30 RX ADMIN — METRONIDAZOLE 500 MG: 500 INJECTION, SOLUTION INTRAVENOUS at 04:08

## 2023-08-30 RX ADMIN — PROMETHAZINE HYDROCHLORIDE 12.5 MG: 12.5 TABLET ORAL at 08:08

## 2023-08-30 RX ADMIN — PROMETHAZINE HYDROCHLORIDE 12.5 MG: 12.5 TABLET ORAL at 10:08

## 2023-08-30 RX ADMIN — POLYETHYLENE GLYCOL 3350 17 G: 17 POWDER, FOR SOLUTION ORAL at 08:08

## 2023-08-30 RX ADMIN — SODIUM CHLORIDE, POTASSIUM CHLORIDE, SODIUM LACTATE AND CALCIUM CHLORIDE: 600; 310; 30; 20 INJECTION, SOLUTION INTRAVENOUS at 06:08

## 2023-08-30 RX ADMIN — ASPIRIN 81 MG: 81 TABLET, COATED ORAL at 08:08

## 2023-08-30 RX ADMIN — CLOPIDOGREL BISULFATE 75 MG: 75 TABLET ORAL at 08:08

## 2023-08-30 RX ADMIN — CIPROFLOXACIN 400 MG: 2 INJECTION, SOLUTION INTRAVENOUS at 10:08

## 2023-08-30 RX ADMIN — METRONIDAZOLE 500 MG: 500 INJECTION, SOLUTION INTRAVENOUS at 07:08

## 2023-08-30 RX ADMIN — PROMETHAZINE HYDROCHLORIDE 12.5 MG: 12.5 TABLET ORAL at 01:08

## 2023-08-30 RX ADMIN — ALUMINUM HYDROXIDE, MAGNESIUM HYDROXIDE, AND DIMETHICONE 30 ML: 400; 400; 40 SUSPENSION ORAL at 06:08

## 2023-08-30 RX ADMIN — SENNOSIDES 8.6 MG: 8.6 TABLET, FILM COATED ORAL at 08:08

## 2023-08-30 RX ADMIN — METRONIDAZOLE 500 MG: 500 INJECTION, SOLUTION INTRAVENOUS at 10:08

## 2023-08-30 RX ADMIN — Medication 9 MG: at 10:08

## 2023-08-30 RX ADMIN — ESCITALOPRAM OXALATE 10 MG: 10 TABLET ORAL at 08:08

## 2023-08-30 RX ADMIN — ONDANSETRON 4 MG: 4 TABLET ORAL at 06:08

## 2023-08-30 RX ADMIN — ATORVASTATIN CALCIUM 40 MG: 40 TABLET, FILM COATED ORAL at 10:08

## 2023-08-30 NOTE — NURSING
Pt AAO x 4, no c/o pain at this time. Pt persist with N/V, PRN med to be given. PT on RA no SOB / distress at this time. PT remains on telemetry monitoring with HR at NSR. Pt continues with correa catheter to gravity. Pt skin is clean, dry, and intact. Pt PIV's are clean, dry, intact, no redness or swelling, flushed and saline locked. Bed low and locked, call light in reach. Monitoring.

## 2023-08-30 NOTE — PT/OT/SLP PROGRESS
"Occupational Therapy   Treatment    Name: Mraiann Huff  MRN: [id number]  Admitting Diagnosis:  Shock  8 Days Post-Op    Recommendations:     Discharge Recommendations: nursing facility, skilled  Discharge Equipment Recommendations:  walker, rolling  Barriers to discharge:       Assessment:     Mariann Huff is a 63 y.o. female with a medical diagnosis of Shock.  She presents with the following performance deficits affecting function: weakness, impaired balance, impaired endurance, decreased coordination, impaired self care skills, impaired functional mobility, gait instability, decreased lower extremity function, decreased upper extremity function.     Pt agreeable to therapy and tolerated session well. Pt requires light to moderate assistance for bed mobility and transfers. Pt c/o nausea. Pt experienced mild LOB when stepping back into chair, requiring assistance to recover.    Rehab Prognosis:  Good; patient would benefit from acute skilled OT services to address these deficits and reach maximum level of function.       Plan:     Patient to be seen 4 x/week to address the above listed problems via self-care/home management, therapeutic activities, therapeutic exercises, neuromuscular re-education  Plan of Care Expires: 09/26/23  Plan of Care Reviewed with: patient    Subjective     Chief Complaint: nausea  Patient/Family Comments/goals: "I dont feel good"  Pain/Comfort:  Pain Rating 1: 0/10  Pain Rating Post-Intervention 1: 0/10    Objective:     Communicated with: RN prior to session.  Patient found HOB elevated with correa catheter, telemetry, pulse ox (continuous) upon OT entry to room.  A client care conference was completed by the OTR and the BARROSO prior to treatment by the BARROSO to discuss the patient's POC and current status.    General Precautions: Standard, fall    Orthopedic Precautions:N/A  Braces: N/A  Respiratory Status: Room air     Occupational Performance:     Bed Mobility:    Patient completed " Scooting/Bridging with minimum assistance  Patient completed Supine to Sit with moderate assistance     Functional Mobility/Transfers:  Patient completed Sit <> Stand Transfer with minimum assistance  with  rolling walker   Patient completed Bed <> Chair Transfer using Step Transfer technique with minimum assistance with rolling walker  Functional Mobility: Pt ambulated ~4ft bed>chair with CGA using RW. When stepping backwards to chair pt experience LOB and required Min A to recover and lower to chair. No SOB noted    Activities of Daily Living:  Upper Body Dressing: minimum assistance to don/doff gown      [facility] 6 Click ADL: 17    Treatment & Education:  Pt educated on OT POC and frequency during hospital stay.     Pt educated on the importance of OOB activity to improve activity tolerance.    Pt educated on proper hand placement and techniques for RW mgmt.    Addressed all patient questions/concerns within BARROSO scope of practice.    Patient left up in chair with all lines intact, call button in reach,  present, and EKG being setup    GOALS:   Multidisciplinary Problems       Occupational Therapy Goals          Problem: Occupational Therapy    Goal Priority Disciplines Outcome Interventions   Occupational Therapy Goal     OT, PT/OT Ongoing, Progressing    Description: Goals to be met by: 9/17/23 (1 month)     Patient will increase functional independence with ADLs by performing:    UE Dressing with Supervision.  LE Dressing with Supervision.  Grooming while standing at sink with Modified [place].  Toileting from toilet with Modified [place] for hygiene and clothing management.   Step transfer with Supervision  Toilet transfer to toilet with Stand-by Assistance.                         Time Tracking:     OT Date of Treatment: 08/30/23  OT Start Time: 0945  OT Stop Time: 1008  OT Total Time (min): 23 min    Billable Minutes:Therapeutic Activity 23    OT/DIMITRY: DIMITRY     Number of DIMITRY visits since last OT  visit: 1    8/30/2023

## 2023-08-30 NOTE — ASSESSMENT & PLAN NOTE
Difficult to control and leading to poor PO intake, has not had BM in several days, will put in a bowel regimen, and continue to treat cholangitis.  Start Scopolamine patch  Change to scheduled phenergan  Daily ekg qtc monitoring

## 2023-08-30 NOTE — PLAN OF CARE
Problem: Diabetes Comorbidity  Goal: Blood Glucose Level Within Targeted Range  Outcome: Ongoing, Progressing     Problem: Fluid and Electrolyte Imbalance (Acute Kidney Injury/Impairment)  Goal: Fluid and Electrolyte Balance  Outcome: Ongoing, Progressing     Problem: Oral Intake Inadequate (Acute Kidney Injury/Impairment)  Goal: Optimal Nutrition Intake  Outcome: Ongoing, Progressing     Problem: Adult Inpatient Plan of Care  Goal: Plan of Care Review  Outcome: Ongoing, Progressing  Goal: Patient-Specific Goal (Individualized)  Outcome: Ongoing, Progressing  Goal: Absence of Hospital-Acquired Illness or Injury  Outcome: Ongoing, Progressing  Goal: Optimal Comfort and Wellbeing  Outcome: Ongoing, Progressing     Problem: Fall Injury Risk  Goal: Absence of Fall and Fall-Related Injury  Outcome: Ongoing, Progressing     Problem: Skin Injury Risk Increased  Goal: Skin Health and Integrity  Outcome: Ongoing, Progressing     Problem: Bleeding (Sepsis/Septic Shock)  Goal: Absence of Bleeding  Outcome: Ongoing, Progressing     Problem: Glycemic Control Impaired (Sepsis/Septic Shock)  Goal: Blood Glucose Level Within Desired Range  Outcome: Ongoing, Progressing     Problem: Infection Progression (Sepsis/Septic Shock)  Goal: Absence of Infection Signs and Symptoms  Outcome: Ongoing, Progressing     Problem: Nutrition Impaired (Sepsis/Septic Shock)  Goal: Optimal Nutrition Intake  Outcome: Ongoing, Progressing

## 2023-08-30 NOTE — NURSING
MD notified of N/V, prn med given with mild relief. EKG ordered and completed showing NSR. Voiding trial was ordered and initiated, will monitor for urine output.

## 2023-08-30 NOTE — SUBJECTIVE & OBJECTIVE
Interval History: Nausea and emesis this am  Change to phenergan which patient stated provided more relief    Review of Systems  Objective:     Vital Signs (Most Recent):  Temp: 97.5 °F (36.4 °C) (08/30/23 1138)  Pulse: 75 (08/30/23 1500)  Resp: 20 (08/30/23 1138)  BP: (!) 148/75 (08/30/23 1138)  SpO2: 100 % (08/30/23 1138) Vital Signs (24h Range):  Temp:  [97.4 °F (36.3 °C)-98.3 °F (36.8 °C)] 97.5 °F (36.4 °C)  Pulse:  [71-85] 75  Resp:  [15-20] 20  SpO2:  [99 %-100 %] 100 %  BP: (136-148)/(71-88) 148/75     Weight: 58.9 kg (129 lb 13.6 oz)  Body mass index is 22.29 kg/m².    Intake/Output Summary (Last 24 hours) at 8/30/2023 1503  Last data filed at 8/30/2023 0730  Gross per 24 hour   Intake 240 ml   Output 1100 ml   Net -860 ml         Physical Exam  Constitutional:       General: She is not in acute distress.     Appearance: Normal appearance. She is ill-appearing. She is not toxic-appearing or diaphoretic.   Cardiovascular:      Rate and Rhythm: Normal rate and regular rhythm.      Heart sounds: No murmur heard.     No friction rub. No gallop.   Pulmonary:      Effort: Pulmonary effort is normal. No respiratory distress.      Breath sounds: Normal breath sounds. No wheezing or rales.   Abdominal:      General: Abdomen is flat. There is no distension.      Palpations: Abdomen is soft.      Tenderness: There is no abdominal tenderness. There is no guarding or rebound.   Musculoskeletal:      Right lower leg: No edema.      Left lower leg: No edema.   Neurological:      Mental Status: She is alert.   Psychiatric:         Mood and Affect: Affect is flat.             Significant Labs: All pertinent labs within the past 24 hours have been reviewed.    Significant Imaging: I have reviewed all pertinent imaging results/findings within the past 24 hours.

## 2023-08-30 NOTE — NURSING
Pt AAO x 4, no c/o pain this shift. Pt c/o intermittent N/V this shift with mild relief from meds.  Pt continues on RA no SOB /distress noted. Pt continues with HR at NSR. Fontenot d/c, pt still unable to void independently MD aware. Voiding trial failed. Bladder scan completed @1730 shows 37mls. Will pass on to night shift. Bed low and locked, call light in reach.

## 2023-08-30 NOTE — PROGRESS NOTES
[facility] - [facility] ([address] Medicine  Progress Note    Patient Name: Mariann Huff  MRN: [id number]  Patient Class: IP- Inpatient   Admission Date: 8/14/2023  Length of Stay: 16 days  Attending Physician: Bolivar Shi*  Primary Care Provider: Jasbir Haney MD        Subjective:     Principal Problem:Shock        HPI:  63 y.o. female with CAD s/p GINGER to ostial LAD in 2014, HTN, HLD, DM2, MURTAZA who presented to the ED to the ER after a near syncopal event at home around 4 pm. She also feels tired and her appetite lately has decreased. she had chest pain and visceral symptoms with nausea and vomiting. She reports losing weight as well.She has had abdominal symptoms with poor PO intake leading to orthostasis. Today she had another near syncopal event and is why she was brought to the ER. Here her ECG showed the posterior changes for which posterior EKG was obtained with showed STEMI. Bedside echo showed an EF 45-50% and norma-lateral and infero-lateral hypokinesis. She was taken to cath lab and her symptoms resolved after PCI. Please refer to PCI for full details.       Overview/Hospital Course:  Patient was taken to cath lab and her symptoms resolved after PCI. Patient chest pain resolved.     Patient reported development of acute abdominal pain associated with nausea and vomiting. Patient was recently admitted (6/23) for suspected biliary pancreatitis and biliary stricture treated with biliary sphincterotomy, biliary stent, and cholecystectomy.    Patient now has worsening transaminitis w/ elevated T-Bili.   Concern for stent occlusion. Patient amylase 107 wnl, and Lipase 61. Workup will include total abdominal ultrasound and [facility] was consulted . Was scheduled for outpatient w/u of hepatic lesions found on previous admission 8/2/23.  Patient had ERCP and EUS on 8/17/23. Visualized a partial occluded stent in the biliary tree. One covered metal stent was  placed into the common bile duct. Concern for metastatic cancer is high due to multiple liver lesions and mass on pancreatic head per [facility]. Initially on previous hospital admission, it was believed to more likely be liver abscesses rather than malignancy due to acute nature of progression. S/p FNA biopsy with ERCP and stent placement on 8/17.     Became hypoglycemic on 8/19 and Hgb dropped from 8 to 6.3 with a reported dark stool, Improved to 7.1 after 1 unit PRBC, could not get a second unit due to respiratory distress, no further episodes of melena. Seen by GI, did not recommend endoscopy at this time for possible GIB.  Given lasix 20mg IV then 40mg IV with good output and improvement in respiratory status.    T bili continued to rise on 8/21, KUB did not show stent migration, patient underwent ERCP again on 8/22 which demonstrated a visibly ocluded stent in the biliary tree with a single biliary stricture in main BD. One covered metal stent placed in CBD. Several hours following this procedure, patient developed rapid onset fever to 103 and rapid drop in BP from SBP 180s to SBP 80s. Patient admitted to MICU for further management. She was continued on vanc/cefepime, briefly on levophed and then weaned off. Still with intermittent AMS and elevateed WBC, febrile 8/25. Adjusted abx to vanc/cipro/flagyl with improvement in mentation. Oral intake still poor, PAPA with Cr 1.4 on 8/26, improved with IVF. Still nauseous      Interval History: Nausea and emesis this am  Change to phenergan which patient stated provided more relief  Voiding trial      Review of Systems  Objective:     Vital Signs (Most Recent):  Temp: 97.5 °F (36.4 °C) (08/30/23 1138)  Pulse: 75 (08/30/23 1500)  Resp: 20 (08/30/23 1138)  BP: (!) 148/75 (08/30/23 1138)  SpO2: 100 % (08/30/23 1138) Vital Signs (24h Range):  Temp:  [97.4 °F (36.3 °C)-98.3 °F (36.8 °C)] 97.5 °F (36.4 °C)  Pulse:  [71-85] 75  Resp:  [15-20] 20  SpO2:  [99 %-100 %] 100 %  BP:  (136-148)/(71-88) 148/75     Weight: 58.9 kg (129 lb 13.6 oz)  Body mass index is 22.29 kg/m².    Intake/Output Summary (Last 24 hours) at 8/30/2023 1503  Last data filed at 8/30/2023 0730  Gross per 24 hour   Intake 240 ml   Output 1100 ml   Net -860 ml         Physical Exam  Constitutional:       General: She is not in acute distress.     Appearance: Normal appearance. She is ill-appearing. She is not toxic-appearing or diaphoretic.   Cardiovascular:      Rate and Rhythm: Normal rate and regular rhythm.      Heart sounds: No murmur heard.     No friction rub. No gallop.   Pulmonary:      Effort: Pulmonary effort is normal. No respiratory distress.      Breath sounds: Normal breath sounds. No wheezing or rales.   Abdominal:      General: Abdomen is flat. There is no distension.      Palpations: Abdomen is soft.      Tenderness: There is no abdominal tenderness. There is no guarding or rebound.   Musculoskeletal:      Right lower leg: No edema.      Left lower leg: No edema.   Neurological:      Mental Status: She is alert.   Psychiatric:         Mood and Affect: Affect is flat.             Significant Labs: All pertinent labs within the past 24 hours have been reviewed.    Significant Imaging: I have reviewed all pertinent imaging results/findings within the past 24 hours.      Assessment/Plan:      * Shock  Presumed Cholangitis  Developed septic shock on 8/22 after ERCP, some notes indicate pus seen during stent exchange, she then became hypotensive and required MICU admission for levophed. She had a troponin elevation, thought to be demand ischemia from hypotension in setting of recent STEMI. She was on vanc/cefepime and transferred to  on 8/25, she was noted to have new spastic movements, cefipime changed to cipro/flagyl due to high vanc trough to avoid nephrotoxicity with zosyn. WBC improving, still nauseous  - cipro/flagyl - plan to continue until at least 8/30 per GI recs  - Follow up bcx, NGTD, last done  8/25 for fever  - Continued nausea      Nausea  Difficult to control and leading to poor PO intake, has not had BM in several days, will put in a bowel regimen, and continue to treat cholangitis.  Start Scopolamine patch  Change to scheduled phenergan  Daily ekg qtc monitoring      Cholangitis        Pancreatic cancer  Diagnosed this admission, will need follow up with oncology.       Atrial fibrillation  Patient with Paroxysmal (<7 days) atrial fibrillation which is controlled currently with Beta Blocker. Patient is currently in sinus rhythm.[id number] Score: 3. . Anticoagulation not indicated due to possible bleeding, was previously on lovenox but it is being held.    Biliary obstruction  Patient reports development of acute abdominal pain associated with nausea and vomiting. Patient was recently admitted (6/23) for suspected biliary pancreatitis and biliary stricture treated with biliary sphincterotomy, biliary stent, and cholecystectomy s/p ERCP w/ metal stent on 8/17, abdominal pain has resolved but T bili is still rising     - Trend T bili daily  -[facility] consulted, s/p ERCP w/ biopsy on 8/17, transaminases and alk phos improving but bili is worsening  -S/p repeat ERCP 8/22 w/ septic shock from presume cholangitis, see septic shock above  - LFTs improving       CHF (congestive heart failure)  -On week prior to admission had CXR with B/L edema, , EF decreased to 40-45%  -Got IV lasix while inpatient and was discharged on 40 po daily however became dehydrated and weak and LVEDP -in the lab showed LVEDP 9, reduce dose to 20 po  - Holding all GDMT in setting of recent shock, will resume once infection is better controlled  - Holding lasix for now    TTE 8/15/23    Left Ventricle: The left ventricle is normal in size. Ventricular mass is normal. Normal wall thickness. regional wall motion abnormalities present. See diagram for wall motion findings. There is moderately reduced systolic function. Ejection  fraction by visual approximation is 40%. Grade II diastolic dysfunction.    Left Atrium: Left atrium is mildly dilated. The left atrium volume index is 35.5 mL/m2.    Right Ventricle: Normal right ventricular cavity size. Wall thickness is normal. Right ventricle wall motion  is normal. Systolic function is normal.    Mitral Valve: There is mild regurgitation.    IVC/SVC: Normal venous pressure at 3 mmHg.    Liver parenchyma is inhomgenous.  Clinical correlation is required.      STEMI (ST elevation myocardial infarction)  -Started having near syncope around 4 pm on the day of admission  -Bedside echo in the ER showed an EF 45-50% and norma-lateral and infero-lateral hypokinesis  -She underwent Successful primary PCI of ramus intermedius and lateral branch with Pronto thrombectomy and 3X16 GINGER     Plan  - Continue aspirin 81 mg daily, stop after 1 month to avoid triple therapy  - Loaded with brilinta 180, loaded with plavix 600 8/15/23, and 75 qd there after  - Continue high intensity statin therapy (LDL goal < 70)  - TTE shows ejection fraction of about 40% associated with grade II diastolic dysfunction.  - Hold lasix for now  - Continue ASA/plavix  - Developed troponin elevation in setting of septic shock as well, seen by cardiology, TTE unchanged, no need for repeat LHC      Other specified anemias  Had notable hemoglobin drop, c/f GIB, however stools turned brown by time of GI evaluation, held lovenox for afib, still holding lovenox, may resume if hgb continues to be stable. Received 2 units PRBC this admission  - Daily CBC  - Hgb goal >8 due to recent STEMI  - Hold lovenox for now      NSTEMI (non-ST elevated myocardial infarction)  During septic shock, see STEMI above for further discussion      History of pancreatitis  Recent (6/2023) suspected biliary pancreatitis and biliary stricture treated with biliary sphincterotomy, biliary stent, and cholecystectomy.  CT A/P with new innumerable hepatic  lesions:  1. Innumerable low attenuating hepatic lesions, new since the previous exam.  Malignancy remains a concern however given short-term development, correlation is needed to exclude multifocal infection/abscess in this patient status post bile duct stent placement and cholecystectomy.  Please note, there is a low attenuating focus within the gallbladder fossa, similar to the foci throughout the hepatic parenchyma..  2. Pancreatic ductal dilatation up to 4 mm, minimally increased compared to prior.  There is fullness of the pancreatic head, underlying mass is not excluded, correlation with recent ERCP advised.  3. Simple and complex bilateral renal cysts.  4. Biliary stent in place with expected pneumobilia.  5. Cholecystectomy with scattered fluid about the gallbladder fossa.  6. Moderate stool throughout the colon, may reflect developing constipation.  7.  Additional findings as above.     - concern for lesions seen on CT A/P, underwent biopsy, now confirm pancreatic CA  - Patient was found to have a hypoechoic pancreatic mass on abdominal US  - Was also found to have this mass on EUS concerning for malignancy   - EUS and ERCP 8/17/23 and then again on 8/22    Altered mental status  Fluctuating mentation since 8/22, at times forgetful and slow to respond, AAOx3 but not aware of situation, ammonia WNL, calcium normal, CT head w/ contrast without evidence of mets or abnormality. Improved on 8/26.      Type 2 diabetes mellitus with stage 2 chronic kidney disease and hypertension  Home Antihyperglycemic Regiment:  -Farxiga, glipizide, insulin daily at home  - Titrate and addition of insulin as needed for BG goal 140-180  - SSI with POCT accuchecks ACHS and Diabetic diet 2000 beti  - Diabetic nutritional counseling given   - Continue home gabapentin for diabetic peripheral neuropathy  - Appreciate Endocrinology recs  - Adjust insulin and monitor closely  - Endocrine following    PAPA (acute kidney  injury)  Resolved  Patient with acute kidney injury/acute renal failure likely due to pre-renal azotemia due to dehydration PAPA is currently improving. Baseline creatinine 0.9 - Labs reviewed- Renal function/electrolytes with Estimated Creatinine Clearance: 45.8 mL/min (based on SCr of 1.1 mg/dL). according to latest data. Monitor urine output and serial BMP and adjust therapy as needed. Avoid nephrotoxins and renally dose meds for GFR listed above.  - Improved with IVF    VTE Risk Mitigation (From admission, onward)         Ordered     enoxaparin injection 40 mg  Every 24 hours         08/23/23 1026                Discharge Planning   REBECCA: 8/31/2023     Code Status: Full Code   Is the patient medically ready for discharge?: No    Reason for patient still in hospital (select all that apply): Patient trending condition and Treatment  Discharge Plan A: Home Health   Discharge Delays: None known at this time      Bolivar Shi MD  Department of Hospital Medicine   [facility] - Intensive Care ([place])

## 2023-08-30 NOTE — CARE UPDATE
BG goal 140-180    -A1C   Lab Results   Component Value Date    HGBA1C 8.8 (H) 07/10/2023       -HOME REGIMEN: Glipizide ER 10mg before breakfast. Levemir 2u daily on a sliding scale     -INPATIENT REGIMEN: Novolog Correction Scale       -NO HYPOGYCEMIAS NOTED     -TOLERATING <25% OF PO DIET     -Diet: Diet diabetic Low Sodium,2gm; 2000 Calorie; Fluid - 2000mL      -Steroids -  N/A     -Tube Feeds - N/A       Remains in CSU. NAEON. BG  well controlled.  Will continue to monitor       Plan:  - Continue Moderate Dose Correction Scale  - BG monitoring ac/hs    Discharge planning: TBD    Endocrine to continue to follow    ** Please call Endocrine for any BG related issues **

## 2023-08-30 NOTE — NURSING
No events overnight. Pt AAOx4. VSS. On RA. NSR on tele. Fontenot intact. Safety measures in place.

## 2023-08-31 PROBLEM — R53.81 PHYSICAL DEBILITY: Status: ACTIVE | Noted: 2023-08-31

## 2023-08-31 LAB
ALBUMIN SERPL BCP-MCNC: 1.3 G/DL (ref 3.5–5.2)
ALP SERPL-CCNC: 697 U/L (ref 55–135)
ALT SERPL W/O P-5'-P-CCNC: 23 U/L (ref 10–44)
ANION GAP SERPL CALC-SCNC: 11 MMOL/L (ref 8–16)
APTT PPP: 39 SEC (ref 21–32)
AST SERPL-CCNC: 64 U/L (ref 10–40)
BASOPHILS # BLD AUTO: 0.04 K/UL (ref 0–0.2)
BASOPHILS NFR BLD: 0.3 % (ref 0–1.9)
BILIRUB SERPL-MCNC: 2.1 MG/DL (ref 0.1–1)
BUN SERPL-MCNC: 12 MG/DL (ref 8–23)
CALCIUM SERPL-MCNC: 8.2 MG/DL (ref 8.7–10.5)
CHLORIDE SERPL-SCNC: 106 MMOL/L (ref 95–110)
CO2 SERPL-SCNC: 22 MMOL/L (ref 23–29)
CREAT SERPL-MCNC: 0.8 MG/DL (ref 0.5–1.4)
DIFFERENTIAL METHOD: ABNORMAL
EOSINOPHIL # BLD AUTO: 0.2 K/UL (ref 0–0.5)
EOSINOPHIL NFR BLD: 1.5 % (ref 0–8)
ERYTHROCYTE [DISTWIDTH] IN BLOOD BY AUTOMATED COUNT: 19.6 % (ref 11.5–14.5)
EST. GFR  (NO RACE VARIABLE): >60 ML/MIN/1.73 M^2
GLUCOSE SERPL-MCNC: 134 MG/DL (ref 70–110)
HCT VFR BLD AUTO: 27.1 % (ref 37–48.5)
HGB BLD-MCNC: 8.4 G/DL (ref 12–16)
IMM GRANULOCYTES # BLD AUTO: 0.14 K/UL (ref 0–0.04)
IMM GRANULOCYTES NFR BLD AUTO: 1.1 % (ref 0–0.5)
INR PPP: 1.3 (ref 0.8–1.2)
LYMPHOCYTES # BLD AUTO: 1.4 K/UL (ref 1–4.8)
LYMPHOCYTES NFR BLD: 11.3 % (ref 18–48)
MAGNESIUM SERPL-MCNC: 2 MG/DL (ref 1.6–2.6)
MCH RBC QN AUTO: 26.2 PG (ref 27–31)
MCHC RBC AUTO-ENTMCNC: 31 G/DL (ref 32–36)
MCV RBC AUTO: 84 FL (ref 82–98)
MONOCYTES # BLD AUTO: 0.6 K/UL (ref 0.3–1)
MONOCYTES NFR BLD: 4.4 % (ref 4–15)
NEUTROPHILS # BLD AUTO: 10.1 K/UL (ref 1.8–7.7)
NEUTROPHILS NFR BLD: 81.4 % (ref 38–73)
NRBC BLD-RTO: 0 /100 WBC
PLATELET # BLD AUTO: 224 K/UL (ref 150–450)
PMV BLD AUTO: 12.9 FL (ref 9.2–12.9)
POCT GLUCOSE: 130 MG/DL (ref 70–110)
POCT GLUCOSE: 152 MG/DL (ref 70–110)
POCT GLUCOSE: 153 MG/DL (ref 70–110)
POCT GLUCOSE: 168 MG/DL (ref 70–110)
POTASSIUM SERPL-SCNC: 3.1 MMOL/L (ref 3.5–5.1)
PROT SERPL-MCNC: 6.3 G/DL (ref 6–8.4)
PROTHROMBIN TIME: 13.5 SEC (ref 9–12.5)
RBC # BLD AUTO: 3.21 M/UL (ref 4–5.4)
SODIUM SERPL-SCNC: 139 MMOL/L (ref 136–145)
WBC # BLD AUTO: 12.38 K/UL (ref 3.9–12.7)

## 2023-08-31 PROCEDURE — 83735 ASSAY OF MAGNESIUM: CPT | Performed by: STUDENT IN AN ORGANIZED HEALTH CARE EDUCATION/TRAINING PROGRAM

## 2023-08-31 PROCEDURE — 85025 COMPLETE CBC W/AUTO DIFF WBC: CPT | Performed by: STUDENT IN AN ORGANIZED HEALTH CARE EDUCATION/TRAINING PROGRAM

## 2023-08-31 PROCEDURE — 63600175 PHARM REV CODE 636 W HCPCS: Performed by: STUDENT IN AN ORGANIZED HEALTH CARE EDUCATION/TRAINING PROGRAM

## 2023-08-31 PROCEDURE — 93010 ELECTROCARDIOGRAM REPORT: CPT | Mod: ,,, | Performed by: INTERNAL MEDICINE

## 2023-08-31 PROCEDURE — 85730 THROMBOPLASTIN TIME PARTIAL: CPT | Performed by: INTERNAL MEDICINE

## 2023-08-31 PROCEDURE — 85610 PROTHROMBIN TIME: CPT | Performed by: STUDENT IN AN ORGANIZED HEALTH CARE EDUCATION/TRAINING PROGRAM

## 2023-08-31 PROCEDURE — 94761 N-INVAS EAR/PLS OXIMETRY MLT: CPT

## 2023-08-31 PROCEDURE — 25000003 PHARM REV CODE 250: Performed by: INTERNAL MEDICINE

## 2023-08-31 PROCEDURE — 63600175 PHARM REV CODE 636 W HCPCS

## 2023-08-31 PROCEDURE — 20600001 HC STEP DOWN PRIVATE ROOM

## 2023-08-31 PROCEDURE — 80053 COMPREHEN METABOLIC PANEL: CPT

## 2023-08-31 PROCEDURE — 25000003 PHARM REV CODE 250: Performed by: STUDENT IN AN ORGANIZED HEALTH CARE EDUCATION/TRAINING PROGRAM

## 2023-08-31 PROCEDURE — 36415 COLL VENOUS BLD VENIPUNCTURE: CPT | Performed by: STUDENT IN AN ORGANIZED HEALTH CARE EDUCATION/TRAINING PROGRAM

## 2023-08-31 PROCEDURE — 93010 EKG 12-LEAD: ICD-10-PCS | Mod: ,,, | Performed by: INTERNAL MEDICINE

## 2023-08-31 PROCEDURE — 93005 ELECTROCARDIOGRAM TRACING: CPT

## 2023-08-31 PROCEDURE — 99232 SBSQ HOSP IP/OBS MODERATE 35: CPT | Mod: ,,, | Performed by: INTERNAL MEDICINE

## 2023-08-31 PROCEDURE — 99232 PR SUBSEQUENT HOSPITAL CARE,LEVL II: ICD-10-PCS | Mod: ,,, | Performed by: INTERNAL MEDICINE

## 2023-08-31 RX ORDER — LOSARTAN POTASSIUM 25 MG/1
25 TABLET ORAL DAILY
Status: DISCONTINUED | OUTPATIENT
Start: 2023-08-31 | End: 2023-09-01 | Stop reason: HOSPADM

## 2023-08-31 RX ORDER — POTASSIUM CHLORIDE 20 MEQ/1
40 TABLET, EXTENDED RELEASE ORAL ONCE
Status: DISCONTINUED | OUTPATIENT
Start: 2023-08-31 | End: 2023-09-01 | Stop reason: HOSPADM

## 2023-08-31 RX ADMIN — METRONIDAZOLE 500 MG: 500 INJECTION, SOLUTION INTRAVENOUS at 05:08

## 2023-08-31 RX ADMIN — PROMETHAZINE HYDROCHLORIDE 12.5 MG: 12.5 TABLET ORAL at 09:08

## 2023-08-31 RX ADMIN — ATORVASTATIN CALCIUM 40 MG: 40 TABLET, FILM COATED ORAL at 08:08

## 2023-08-31 RX ADMIN — BENZONATATE 100 MG: 100 CAPSULE ORAL at 09:08

## 2023-08-31 RX ADMIN — ENOXAPARIN SODIUM 40 MG: 40 INJECTION SUBCUTANEOUS at 05:08

## 2023-08-31 RX ADMIN — PROMETHAZINE HYDROCHLORIDE 12.5 MG: 12.5 TABLET ORAL at 02:08

## 2023-08-31 RX ADMIN — CIPROFLOXACIN 400 MG: 2 INJECTION, SOLUTION INTRAVENOUS at 01:08

## 2023-08-31 RX ADMIN — METRONIDAZOLE 500 MG: 500 INJECTION, SOLUTION INTRAVENOUS at 01:08

## 2023-08-31 RX ADMIN — METRONIDAZOLE 500 MG: 500 INJECTION, SOLUTION INTRAVENOUS at 08:08

## 2023-08-31 RX ADMIN — SENNOSIDES 8.6 MG: 8.6 TABLET, FILM COATED ORAL at 08:08

## 2023-08-31 RX ADMIN — ASPIRIN 81 MG: 81 TABLET, COATED ORAL at 08:08

## 2023-08-31 RX ADMIN — ESCITALOPRAM OXALATE 10 MG: 10 TABLET ORAL at 08:08

## 2023-08-31 RX ADMIN — CLOPIDOGREL BISULFATE 75 MG: 75 TABLET ORAL at 08:08

## 2023-08-31 RX ADMIN — CIPROFLOXACIN 400 MG: 2 INJECTION, SOLUTION INTRAVENOUS at 11:08

## 2023-08-31 RX ADMIN — PROMETHAZINE HYDROCHLORIDE 25 MG: 25 INJECTION INTRAMUSCULAR; INTRAVENOUS at 06:08

## 2023-08-31 RX ADMIN — PROCHLORPERAZINE MALEATE 5 MG: 5 TABLET ORAL at 09:08

## 2023-08-31 RX ADMIN — ALUMINUM HYDROXIDE, MAGNESIUM HYDROXIDE, AND DIMETHICONE 30 ML: 400; 400; 40 SUSPENSION ORAL at 05:08

## 2023-08-31 RX ADMIN — LOSARTAN POTASSIUM 25 MG: 25 TABLET, FILM COATED ORAL at 09:08

## 2023-08-31 NOTE — PLAN OF CARE
Pt AAOx4. , Dr. Valiente made aware. On RA. NSR on tele. 1 loose BM this shift. Pt voided spontaneously this AM. Pt vomiting this AM. Safety measures in place.      Problem: Diabetes Comorbidity  Goal: Blood Glucose Level Within Targeted Range  Outcome: Ongoing, Progressing     Problem: Fluid and Electrolyte Imbalance (Acute Kidney Injury/Impairment)  Goal: Fluid and Electrolyte Balance  Outcome: Ongoing, Progressing     Problem: Oral Intake Inadequate (Acute Kidney Injury/Impairment)  Goal: Optimal Nutrition Intake  Outcome: Ongoing, Progressing     Problem: Renal Function Impairment (Acute Kidney Injury/Impairment)  Goal: Effective Renal Function  Outcome: Ongoing, Progressing     Problem: Adult Inpatient Plan of Care  Goal: Plan of Care Review  Outcome: Ongoing, Progressing  Goal: Patient-Specific Goal (Individualized)  Outcome: Ongoing, Progressing  Goal: Absence of Hospital-Acquired Illness or Injury  Outcome: Ongoing, Progressing  Goal: Optimal Comfort and Wellbeing  Outcome: Ongoing, Progressing  Goal: Readiness for Transition of Care  Outcome: Ongoing, Progressing     Problem: Fall Injury Risk  Goal: Absence of Fall and Fall-Related Injury  Outcome: Ongoing, Progressing     Problem: Skin Injury Risk Increased  Goal: Skin Health and Integrity  Outcome: Ongoing, Progressing     Problem: Adjustment to Illness (Sepsis/Septic Shock)  Goal: Optimal Coping  Outcome: Ongoing, Progressing     Problem: Bleeding (Sepsis/Septic Shock)  Goal: Absence of Bleeding  Outcome: Ongoing, Progressing     Problem: Glycemic Control Impaired (Sepsis/Septic Shock)  Goal: Blood Glucose Level Within Desired Range  Outcome: Ongoing, Progressing     Problem: Infection Progression (Sepsis/Septic Shock)  Goal: Absence of Infection Signs and Symptoms  Outcome: Ongoing, Progressing     Problem: Nutrition Impaired (Sepsis/Septic Shock)  Goal: Optimal Nutrition Intake  Outcome: Ongoing, Progressing     Problem: Infection  Goal: Absence  of Infection Signs and Symptoms  Outcome: Ongoing, Progressing

## 2023-08-31 NOTE — PT/OT/SLP PROGRESS
Physical Therapy      Patient Name:  Mariann Huff   MRN:  [id number]    Patient not seen today secondary to Patient fatigue, Other (at 10:07 am, pt just got back to bed from using BSC and too tired right now). Will follow-up.

## 2023-08-31 NOTE — PLAN OF CARE
[facility] ([place] [address])      HOME HEALTH ORDERS  FACE TO FACE ENCOUNTER    Patient Name: Mariann Huff  YOB: 1960    PCP: Jasbir Haney MD   PCP Address: [address] [facility] / [place] [address]  PCP Phone Number: [phone]  PCP Fax: [phone]    Encounter Date: 8/14/23    Admit to Home Health    Diagnoses:  Active Hospital Problems    Diagnosis  POA    *Physical debility [R53.81]  No    Pancreatic cancer [C25.9]  Yes    Atrial fibrillation [I48.91]  Yes    Biliary obstruction [K83.1]  Yes    CHF (congestive heart failure) [I50.9]  No    Other specified anemias [D64.89]  Yes    NSTEMI (non-ST elevated myocardial infarction) [I21.4]  Yes    Hyperbilirubinemia [E80.6]  Yes    History of pancreatitis [Z87.19]  Not Applicable    Moderate malnutrition [E44.0]  Yes    Type 2 diabetes mellitus with stage 2 chronic kidney disease and hypertension [E11.22, I12.9, N18.2]  Yes     Chronic    Sleep apnea [G47.30]  Yes     Chronic      Resolved Hospital Problems    Diagnosis Date Resolved POA    Cholangitis [K83.09] 09/01/2023 Yes    Nausea [R11.0] 09/01/2023 Yes    Shock [R57.9] 09/01/2023 Yes    Gastrointestinal hemorrhage with melena [K92.1] 08/23/2023 No    Anemia [D64.9] 08/25/2023 Yes    STEMI (ST elevation myocardial infarction) [I21.3] 09/01/2023 Yes    Abdominal pain [R10.9] 08/23/2023 No    Liver lesion [K76.9] 08/25/2023 Yes    Elevated troponin [R77.8] 08/25/2023 Yes    Altered mental status [R41.82] 08/31/2023 Yes    PAPA (acute kidney injury) [N17.9] 09/01/2023 Yes       Follow Up Appointments:  Future Appointments   Date Time Provider Department Center   9/6/2023  9:00 AM Chelsey Hernandez, PT [facility]   9/8/2023 10:00 AM LAB, HEMONC SAME DAY [facility] [place]   9/8/2023 11:00 AM Sean Mccauley MD [facility] [place]   9/20/2023 10:00 AM Jasbir Hanye MD [facility] IM [place]       Allergies:  Review of patient's allergies indicates:   Allergen  Reactions    Milk containing products (dairy)      Causes GI distress       Medications: Review discharge medications with patient and family and provide education.    Current Facility-Administered Medications   Medication Dose Route Frequency Provider Last Rate Last Admin    0.9%  NaCl infusion (for blood administration)   Intravenous Q24H PRN Baudilio Lozada MD        acetaminophen suppository 650 mg  650 mg Rectal Q8H PRN Silvino Douglas MD   650 mg at 08/23/23 0108    aluminum & magnesium hydroxide-simethicone 400-400-40 mg/5 mL suspension 30 mL  30 mL Oral Q6H PRN Laurence Sandhu PA-C   30 mL at 08/31/23 1720    aspirin EC tablet 81 mg  81 mg Oral Daily Shen Reese MD   81 mg at 09/01/23 0813    atorvastatin tablet 40 mg  40 mg Oral QHS Shen Reese MD   40 mg at 08/31/23 2013    benzonatate capsule 100 mg  100 mg Oral TID PRN Bolivar Shi MD   100 mg at 08/31/23 0958    ciprofloxacin (CIPRO)400mg/200ml D5W IVPB 400 mg  400 mg Intravenous Q12H Baudilio Lozada MD   Stopped at 09/01/23 0056    clopidogreL tablet 75 mg  75 mg Oral Daily Shen Reese MD   75 mg at 09/01/23 0812    dextrose 10% bolus 125 mL 125 mL  12.5 g Intravenous PRN Dayanna Osorio MD        dextrose 10% bolus 250 mL 250 mL  25 g Intravenous PRN Dayanna Osorio MD   Stopped at 08/19/23 0818    enoxaparin injection 40 mg  40 mg Subcutaneous Q24H (prophylaxis, 1700) Reji Veras MD   40 mg at 08/31/23 1700    EScitalopram oxalate tablet 10 mg  10 mg Oral Daily Shen Reese MD   10 mg at 09/01/23 0813    glucagon (human recombinant) injection 1 mg  1 mg Intramuscular PRN Dayanna Osorio MD        glucose chewable tablet 16 g  16 g Oral PRN Dayanna Osorio MD        glucose chewable tablet 24 g  24 g Oral PRN Dayanna Osorio MD   24 g at 08/19/23 0739    HYDROmorphone injection 0.5 mg  0.5 mg Intravenous Q6H PRN Baudilio Lozada MD        insulin aspart U-100  pen 1-10 Units  1-10 Units Subcutaneous QID (AC + HS) PRN Blayne Gray, DNP, FNP   4 Units at 08/27/23 1216    losartan tablet 25 mg  25 mg Oral Daily Francisco Hyman MD   25 mg at 09/01/23 0813    melatonin tablet 9 mg  9 mg Oral Nightly PRN Shen Reese MD   9 mg at 08/30/23 2217    metronidazole IVPB 500 mg  500 mg Intravenous Q8H Baudilio Lozada MD   Stopped at 09/01/23 0400    oxyCODONE immediate release tablet 5 mg  5 mg Oral Q6H PRN Baudilio Lozada MD   5 mg at 08/28/23 1108    polyethylene glycol packet 17 g  17 g Oral Daily Baudilio Lozada MD   17 g at 08/30/23 0829    potassium chloride SA CR tablet 40 mEq  40 mEq Oral Once Francisco Hyman MD        potassium chloride SA CR tablet 40 mEq  40 mEq Oral Once Francisco Hyman MD        prochlorperazine tablet 5 mg  5 mg Oral TID PRN Baudilio Lozada MD   5 mg at 08/31/23 0958    promethazine (PHENERGAN) 25 mg in dextrose 5 % (D5W) 50 mL IVPB  25 mg Intravenous Q6H PRN Bolivar Shi MD   Stopped at 08/31/23 0650    promethazine tablet 12.5 mg  12.5 mg Oral Q8H Bolivar Shi MD   12.5 mg at 09/01/23 0607    scopolamine 1.3-1.5 mg (1 mg over 3 days) 1 patch  1 patch Transdermal Q3 Days Bolivar Shi MD   1 patch at 08/29/23 0905    senna tablet 8.6 mg  8.6 mg Oral Daily Baudilio Lozada MD   8.6 mg at 08/31/23 0844    sodium chloride 0.9% flush 10 mL  10 mL Intravenous PRN Shen Reese MD         Facility-Administered Medications Ordered in Other Encounters   Medication Dose Route Frequency Provider Last Rate Last Admin    0.9%  NaCl infusion   Intravenous Continuous Aime George  mL/hr at 10/27/22 0842 New Bag at 10/27/22 0842    fentaNYL 50 mcg/mL injection  mcg   mcg Intravenous PRN Mook Chavez,         midazolam (VERSED) 1 mg/mL injection 0.5-4 mg  0.5-4 mg Intravenous PRN Mook Chavez DO         Current Discharge Medication List        START taking these  medications    Details   aspirin (ECOTRIN) 81 MG EC tablet Take 1 tablet (81 mg total) by mouth once daily.  Qty: 60 tablet, Refills: 3      benzonatate (TESSALON) 100 MG capsule Take 1 capsule (100 mg total) by mouth 3 (three) times daily as needed for Cough.  Qty: 30 capsule, Refills: 0      clopidogreL (PLAVIX) 75 mg tablet Take 1 tablet (75 mg total) by mouth once daily.  Qty: 30 tablet, Refills: 3      HYDROcodone-acetaminophen (NORCO) 5-325 mg per tablet Take 1 tablet by mouth every 6 (six) hours as needed for Pain.  Qty: 20 tablet, Refills: 0    Comments: Quantity prescribed more than 7 day supply? No      prochlorperazine (COMPAZINE) 5 MG tablet Take 1 tablet (5 mg total) by mouth 3 (three) times daily as needed for Nausea.  Qty: 30 tablet, Refills: 0      scopolamine (TRANSDERM-SCOP) 1.3-1.5 mg (1 mg over 3 days) Place 1 patch onto the skin Every 3 (three) days.  Qty: 10 patch, Refills: 0           CONTINUE these medications which have NOT CHANGED    Details   apixaban (ELIQUIS) 5 mg Tab Take 1 tablet (5 mg total) by mouth 2 (two) times daily.  Qty: 60 tablet, Refills: 1      atorvastatin (LIPITOR) 40 MG tablet Take 1 tablet (40 mg total) by mouth every evening.  Qty: 90 tablet, Refills: 3      losartan (COZAAR) 25 MG tablet Take 1 tablet (25 mg total) by mouth once daily.  Qty: 90 tablet, Refills: 3    Comments: .      magnesium oxide (MAG-OX) 400 mg (241.3 mg magnesium) tablet Take 1 tablet (400 mg total) by mouth 2 (two) times daily.  Qty: 120 tablet, Refills: 0      metoprolol succinate (TOPROL-XL) 25 MG 24 hr tablet Take 0.5 tablets (12.5 mg total) by mouth once daily.  Qty: 15 tablet, Refills: 11    Comments: .      ACCU-CHEK EDIN PLUS METER Misc       blood sugar diagnostic (ACCU-CHEK EDIN PLUS TEST STRP) Strp TEST BLOOD SUGARS 4 TIMES DAILY  Qty: 150 strip, Refills: 11    Associated Diagnoses: Type II or unspecified type diabetes mellitus with peripheral circulatory disorders, uncontrolled(250.72)  "     gabapentin (NEURONTIN) 300 MG capsule Take 1 capsule (300 mg) in the morning and 2 capsules (600 mg) in the evening  Qty: 90 capsule, Refills: 0      insulin detemir U-100, Levemir, (LEVEMIR FLEXPEN) 100 unit/mL (3 mL) InPn pen Inject 14 Units into the skin every evening.  Qty: 12 mL, Refills: 3      pen needle, diabetic (NOVOTWIST) 32 gauge x 1/5" Ndle Use 1 needle to inject insulin four times daily  Qty: 400 each, Refills: 2           STOP taking these medications       cefpodoxime (VANTIN) 100 MG tablet Comments:   Reason for Stopping:         EScitalopram oxalate (LEXAPRO) 10 MG tablet Comments:   Reason for Stopping:         furosemide (LASIX) 40 MG tablet Comments:   Reason for Stopping:         spironolactone (ALDACTONE) 25 MG tablet Comments:   Reason for Stopping:         ticagrelor (BRILINTA) 90 mg tablet Comments:   Reason for Stopping:         dapagliflozin (FARXIGA) 10 mg tablet Comments:   Reason for Stopping:         diclofenac sodium (VOLTAREN) 1 % Gel Comments:   Reason for Stopping:         glipiZIDE (GLUCOTROL) 10 MG TR24 Comments:   Reason for Stopping:         pen needle, diabetic 32 gauge x 5/32" Ndle Comments:   Reason for Stopping:                 I have seen and examined this patient within the last 30 days. My clinical findings that support the need for the home health skilled services and home bound status are the following:no   Weakness/numbness causing balance and gait disturbance due to Debility making it taxing to leave home.     Diet:   cardiac diet    Labs:  routine    Referrals/ Consults  Physical Therapy to evaluate and treat. Evaluate for home safety and equipment needs; Establish/upgrade home exercise program. Perform / instruct on therapeutic exercises, gait training, transfer training, and Range of Motion.  Occupational Therapy to evaluate and treat. Evaluate home environment for safety and equipment needs. Perform/Instruct on transfers, ADL training, ROM, and therapeutic " exercises.    Activities:   activity as tolerated    Nursing:   Agency to admit patient within 24 hours of hospital discharge unless specified on physician order or at patient request    SN to complete comprehensive assessment including routine vital signs. Instruct on disease process and s/s of complications to report to MD. Review/verify medication list sent home with the patient at time of discharge  and instruct patient/caregiver as needed. Frequency may be adjusted depending on start of care date.     Skilled nurse to perform up to 3 visits PRN for symptoms related to diagnosis    Notify MD if SBP > 160 or < 90; DBP > 90 or < 50; HR > 120 or < 50; Temp > 101; O2 < 88%; Other:       Ok to schedule additional visits based on staff availability and patient request on consecutive days within the home health episode.    When multiple disciplines ordered:    Start of Care occurs on Sunday - Wednesday schedule remaining discipline evaluations as ordered on separate consecutive days following the start of care.    Thursday SOC -schedule subsequent evaluations Friday and Monday the following week.     Friday - Saturday SOC - schedule subsequent discipline evaluations on consecutive days starting Monday of the following week.    For all post-discharge communication and subsequent orders please contact patient's primary care physician. If unable to reach primary care physician or do not receive response within 30 minutes, please contact Dr Francisco Hyman for clinical staff order clarification      Home Health Aide:  Physical Therapy Every other day and Occupational Therapy Every other day      I certify that this patient is confined to her home and needs physical therapy and occupational therapy.

## 2023-08-31 NOTE — PROGRESS NOTES
[facility] - [facility] ([address] Medicine  Progress Note    Patient Name: Mariann Huff  MRN: [id number]  Patient Class: IP- Inpatient   Admission Date: 8/14/2023  Length of Stay: 17 days  Attending Physician: Francisco Hyman MD  Primary Care Provider: Jasbir Haney MD        Subjective:     Principal Problem:Physical debility        HPI:  63 y.o. female with CAD s/p GINGER to ostial LAD in 2014, HTN, HLD, DM2, MURTAZA who presented to the ED to the ER after a near syncopal event at home around 4 pm. She also feels tired and her appetite lately has decreased. she had chest pain and visceral symptoms with nausea and vomiting. She reports losing weight as well.She has had abdominal symptoms with poor PO intake leading to orthostasis. Today she had another near syncopal event and is why she was brought to the ER. Here her ECG showed the posterior changes for which posterior EKG was obtained with showed STEMI. Bedside echo showed an EF 45-50% and norma-lateral and infero-lateral hypokinesis. She was taken to cath lab and her symptoms resolved after PCI. Please refer to PCI for full details.       Overview/Hospital Course:  Patient was taken to cath lab and her symptoms resolved after PCI. Patient chest pain resolved.     Patient reported development of acute abdominal pain associated with nausea and vomiting. Patient was recently admitted (6/23) for suspected biliary pancreatitis and biliary stricture treated with biliary sphincterotomy, biliary stent, and cholecystectomy.    Patient now has worsening transaminitis w/ elevated T-Bili.   Concern for stent occlusion. Patient amylase 107 wnl, and Lipase 61. Workup will include total abdominal ultrasound and [facility] was consulted . Was scheduled for outpatient w/u of hepatic lesions found on previous admission 8/2/23.  Patient had ERCP and EUS on 8/17/23. Visualized a partial occluded stent in the biliary tree. One covered metal stent was  placed into the common bile duct. Concern for metastatic cancer is high due to multiple liver lesions and mass on pancreatic head per [facility]. Initially on previous hospital admission, it was believed to more likely be liver abscesses rather than malignancy due to acute nature of progression. S/p FNA biopsy with ERCP and stent placement on 8/17.     Became hypoglycemic on 8/19 and Hgb dropped from 8 to 6.3 with a reported dark stool, Improved to 7.1 after 1 unit PRBC, could not get a second unit due to respiratory distress, no further episodes of melena. Seen by GI, did not recommend endoscopy at this time for possible GIB.  Given lasix 20mg IV then 40mg IV with good output and improvement in respiratory status.    T bili continued to rise on 8/21, KUB did not show stent migration, patient underwent ERCP again on 8/22 which demonstrated a visibly ocluded stent in the biliary tree with a single biliary stricture in main BD. One covered metal stent placed in CBD. Several hours following this procedure, patient developed rapid onset fever to 103 and rapid drop in BP from SBP 180s to SBP 80s. Patient admitted to MICU for further management. She was continued on vanc/cefepime, briefly on levophed and then weaned off. Still with intermittent AMS and elevateed WBC, febrile 8/25. Adjusted abx to vanc/cipro/flagyl with improvement in mentation. Oral intake still poor, PAPA with Cr 1.4 on 8/26, improved with IVF. Still nauseous      Interval History: Patient complained of some vomiting today, otherwise has no issues. She wants to go home with HH, despite recommendations of SNF    Review of Systems   Respiratory:  Positive for cough.    Gastrointestinal:  Positive for vomiting. Negative for abdominal pain.     Objective:     Vital Signs (Most Recent):  Temp: 98 °F (36.7 °C) (08/31/23 1159)  Pulse: 81 (08/31/23 1520)  Resp: 19 (08/31/23 1159)  BP: (!) 164/87 (08/31/23 1159)  SpO2: 100 % (08/31/23 1159) Vital Signs (24h  Range):  Temp:  [97.6 °F (36.4 °C)-98 °F (36.7 °C)] 98 °F (36.7 °C)  Pulse:  [70-81] 81  Resp:  [18-20] 19  SpO2:  [99 %-100 %] 100 %  BP: (147-170)/(70-88) 164/87     Weight: 58.9 kg (129 lb 13.6 oz)  Body mass index is 22.29 kg/m².    Intake/Output Summary (Last 24 hours) at 8/31/2023 1601  Last data filed at 8/31/2023 1230  Gross per 24 hour   Intake 2663.03 ml   Output --   Net 2663.03 ml         Physical Exam  Constitutional:       General: She is not in acute distress.     Appearance: She is obese.   HENT:      Head: Normocephalic.      Right Ear: External ear normal.      Left Ear: External ear normal.      Nose: Nose normal.   Cardiovascular:      Rate and Rhythm: Normal rate.   Pulmonary:      Effort: Pulmonary effort is normal.   Abdominal:      Palpations: Abdomen is soft.      Tenderness: There is no abdominal tenderness.   Musculoskeletal:      Comments: Debility   Skin:     General: Skin is warm.   Neurological:      Mental Status: She is alert and oriented to person, place, and time.   Psychiatric:         Mood and Affect: Mood normal.             Significant Labs: All pertinent labs within the past 24 hours have been reviewed.          Assessment/Plan:      * Physical debility  Despite recommendations of SNF, patient wants to do home with HH    Nausea  Difficult to control and leading to poor PO intake, has not had BM in several days, will put in a bowel regimen, and continue to treat cholangitis.  Start Scopolamine patch  Change to scheduled phenergan  Daily ekg qtc monitoring      Cholangitis        Pancreatic cancer  Diagnosed this admission, will need follow up with oncology.       Shock  Presumed Cholangitis  Developed septic shock on 8/22 after ERCP, some notes indicate pus seen during stent exchange, she then became hypotensive and required MICU admission for levophed. She had a troponin elevation, thought to be demand ischemia from hypotension in setting of recent STEMI. She was on  vanc/cefepime and transferred to  on 8/25, she was noted to have new spastic movements, cefipime changed to cipro/flagyl due to high vanc trough to avoid nephrotoxicity with zosyn. WBC improving, still nauseous  - cipro/flagyl - plan to continue until at least 8/30 per GI recs  - Follow up bcx, NGTD, last done 8/25 for fever  - Continued nausea      Atrial fibrillation  Patient with Paroxysmal (<7 days) atrial fibrillation which is controlled currently with Beta Blocker. Patient is currently in sinus rhythm.[id number] Score: 3. . Anticoagulation not indicated due to possible bleeding, was previously on lovenox but it is being held.    Biliary obstruction  Patient reports development of acute abdominal pain associated with nausea and vomiting. Patient was recently admitted (6/23) for suspected biliary pancreatitis and biliary stricture treated with biliary sphincterotomy, biliary stent, and cholecystectomy s/p ERCP w/ metal stent on 8/17, abdominal pain has resolved but T bili is still rising     - Trend T bili daily  -[facility] consulted, s/p ERCP w/ biopsy on 8/17, transaminases and alk phos improving but bili is worsening  -S/p repeat ERCP 8/22 w/ septic shock from presume cholangitis, see septic shock above  - LFTs improving       CHF (congestive heart failure)  -On week prior to admission had CXR with B/L edema, , EF decreased to 40-45%  -Got IV lasix while inpatient and was discharged on 40 po daily however became dehydrated and weak and LVEDP -in the lab showed LVEDP 9, reduce dose to 20 po  - Holding all GDMT in setting of recent shock, will resume once infection is better controlled  - Holding lasix for now    TTE 8/15/23    Left Ventricle: The left ventricle is normal in size. Ventricular mass is normal. Normal wall thickness. regional wall motion abnormalities present. See diagram for wall motion findings. There is moderately reduced systolic function. Ejection fraction by visual approximation is 40%.  Grade II diastolic dysfunction.    Left Atrium: Left atrium is mildly dilated. The left atrium volume index is 35.5 mL/m2.    Right Ventricle: Normal right ventricular cavity size. Wall thickness is normal. Right ventricle wall motion  is normal. Systolic function is normal.    Mitral Valve: There is mild regurgitation.    IVC/SVC: Normal venous pressure at 3 mmHg.    Liver parenchyma is inhomgenous.  Clinical correlation is required.      STEMI (ST elevation myocardial infarction)  -Started having near syncope around 4 pm on the day of admission  -Bedside echo in the ER showed an EF 45-50% and norma-lateral and infero-lateral hypokinesis  -She underwent Successful primary PCI of ramus intermedius and lateral branch with Pronto thrombectomy and 3X16 GINGER     Plan  - Continue aspirin 81 mg daily, stop after 1 month to avoid triple therapy  - Loaded with brilinta 180, loaded with plavix 600 8/15/23, and 75 qd there after  - Continue high intensity statin therapy (LDL goal < 70)  - TTE shows ejection fraction of about 40% associated with grade II diastolic dysfunction.  - Hold lasix for now  - Continue ASA/plavix  - Developed troponin elevation in setting of septic shock as well, seen by cardiology, TTE unchanged, no need for repeat LHC      Other specified anemias  Had notable hemoglobin drop, c/f GIB, however stools turned brown by time of GI evaluation, held lovenox for afib, still holding lovenox, may resume if hgb continues to be stable. Received 2 units PRBC this admission  - Daily CBC  - Hgb goal >8 due to recent STEMI  - Hold lovenox for now      NSTEMI (non-ST elevated myocardial infarction)  During septic shock, see STEMI above for further discussion      History of pancreatitis  Recent (6/2023) suspected biliary pancreatitis and biliary stricture treated with biliary sphincterotomy, biliary stent, and cholecystectomy.  CT A/P with new innumerable hepatic lesions:  1. Innumerable low attenuating hepatic  lesions, new since the previous exam.  Malignancy remains a concern however given short-term development, correlation is needed to exclude multifocal infection/abscess in this patient status post bile duct stent placement and cholecystectomy.  Please note, there is a low attenuating focus within the gallbladder fossa, similar to the foci throughout the hepatic parenchyma..  2. Pancreatic ductal dilatation up to 4 mm, minimally increased compared to prior.  There is fullness of the pancreatic head, underlying mass is not excluded, correlation with recent ERCP advised.  3. Simple and complex bilateral renal cysts.  4. Biliary stent in place with expected pneumobilia.  5. Cholecystectomy with scattered fluid about the gallbladder fossa.  6. Moderate stool throughout the colon, may reflect developing constipation.  7.  Additional findings as above.     - concern for lesions seen on CT A/P, underwent biopsy, now confirm pancreatic CA  - Patient was found to have a hypoechoic pancreatic mass on abdominal US  - Was also found to have this mass on EUS concerning for malignancy   - EUS and ERCP 8/17/23 and then again on 8/22    Type 2 diabetes mellitus with stage 2 chronic kidney disease and hypertension  Home Antihyperglycemic Regiment:  -Farxiga, glipizide, insulin daily at home  - Titrate and addition of insulin as needed for BG goal 140-180  - SSI with POCT accuchecks ACHS and Diabetic diet 2000 beti  - Diabetic nutritional counseling given   - Continue home gabapentin for diabetic peripheral neuropathy  - Appreciate Endocrinology recs  - Adjust insulin and monitor closely  - Endocrine following    PAPA (acute kidney injury)  Resolved  Patient with acute kidney injury/acute renal failure likely due to pre-renal azotemia due to dehydration PAPA is currently improving. Baseline creatinine 0.9 - Labs reviewed- Renal function/electrolytes with Estimated Creatinine Clearance: 45.8 mL/min (based on SCr of 1.1 mg/dL). according to  latest data. Monitor urine output and serial BMP and adjust therapy as needed. Avoid nephrotoxins and renally dose meds for GFR listed above.  - Improved with IVF      VTE Risk Mitigation (From admission, onward)         Ordered     enoxaparin injection 40 mg  Every 24 hours         08/23/23 1026                Discharge Planning   REBECCA: 8/31/2023     Code Status: Full Code   Is the patient medically ready for discharge?: No    Reason for patient still in hospital (select all that apply): Patient trending condition  Discharge Plan A: Home Health   Discharge Delays: None known at this time              Francisco Hyman MD  Department of Hospital Medicine   [facility] - Intensive Care ([place]-[address])

## 2023-08-31 NOTE — SUBJECTIVE & OBJECTIVE
Interval History: Patient complained of some vomiting today, otherwise has no issues. She wants to go home with HH, despite recommendations of SNF    Review of Systems   Respiratory:  Positive for cough.    Gastrointestinal:  Positive for vomiting. Negative for abdominal pain.     Objective:     Vital Signs (Most Recent):  Temp: 98 °F (36.7 °C) (08/31/23 1159)  Pulse: 81 (08/31/23 1520)  Resp: 19 (08/31/23 1159)  BP: (!) 164/87 (08/31/23 1159)  SpO2: 100 % (08/31/23 1159) Vital Signs (24h Range):  Temp:  [97.6 °F (36.4 °C)-98 °F (36.7 °C)] 98 °F (36.7 °C)  Pulse:  [70-81] 81  Resp:  [18-20] 19  SpO2:  [99 %-100 %] 100 %  BP: (147-170)/(70-88) 164/87     Weight: 58.9 kg (129 lb 13.6 oz)  Body mass index is 22.29 kg/m².    Intake/Output Summary (Last 24 hours) at 8/31/2023 1601  Last data filed at 8/31/2023 1230  Gross per 24 hour   Intake 2663.03 ml   Output --   Net 2663.03 ml         Physical Exam  Constitutional:       General: She is not in acute distress.     Appearance: She is obese.   HENT:      Head: Normocephalic.      Right Ear: External ear normal.      Left Ear: External ear normal.      Nose: Nose normal.   Cardiovascular:      Rate and Rhythm: Normal rate.   Pulmonary:      Effort: Pulmonary effort is normal.   Abdominal:      Palpations: Abdomen is soft.      Tenderness: There is no abdominal tenderness.   Musculoskeletal:      Comments: Debility   Skin:     General: Skin is warm.   Neurological:      Mental Status: She is alert and oriented to person, place, and time.   Psychiatric:         Mood and Affect: Mood normal.             Significant Labs: All pertinent labs within the past 24 hours have been reviewed.

## 2023-08-31 NOTE — PLAN OF CARE
Problem: Diabetes Comorbidity  Goal: Blood Glucose Level Within Targeted Range  Outcome: Ongoing, Progressing     Problem: Adult Inpatient Plan of Care  Goal: Plan of Care Review  Outcome: Ongoing, Progressing  Goal: Patient-Specific Goal (Individualized)  Outcome: Ongoing, Progressing     Problem: Fluid and Electrolyte Imbalance (Acute Kidney Injury/Impairment)  Goal: Fluid and Electrolyte Balance  Outcome: Ongoing, Not Progressing     Problem: Oral Intake Inadequate (Acute Kidney Injury/Impairment)  Goal: Optimal Nutrition Intake  Outcome: Ongoing, Not Progressing     Problem: Renal Function Impairment (Acute Kidney Injury/Impairment)  Goal: Effective Renal Function  Outcome: Ongoing, Not Progressing    Pt remains stable. Still complains of nausea. Needs met at this time.

## 2023-08-31 NOTE — PROGRESS NOTES
[facility] - [facility] ([facility]-)  Adult Nutrition  Progress Note    SUMMARY       Recommendations    Continue Low Na/Diabetic diet + ONS w/ fluid restriction per MD. Encourage PO intake as tolerated.  RD to monitor & follow-up.    Goals: Meet % EEN, EPN by RD f/u date  Nutrition Goal Status: goal not met  Communication of RD Recs: reviewed with RN    Assessment and Plan    Moderate malnutrition    Nutrition Problem:  Moderate Protein-Calorie Malnutrition  Malnutrition in the context of Chronic Illness/Injury    Related to (etiology):  Inability to consume sufficient energy     Signs and Symptoms (as evidenced by):  Energy Intake: <75% of estimated energy requirement for > 1 month   Body Fat Depletion: moderate depletion of orbitals and triceps   Muscle Mass Depletion: moderate depletion of temples and clavicle region   Weight Loss: 15% x 9-10 months    Interventions(treatment strategy):  Collaboration of nutrition care w/ other providers  ONS    Nutrition Diagnosis Status:  Continues    Malnutrition Assessment    Malnutrition Context: chronic illness  Malnutrition Level: moderate    Weight Loss (Malnutrition): other (see comments) (15% x 9-10 months)  Energy Intake (Malnutrition): less than 75% for greater than or equal to 1 month  Subcutaneous Fat (Malnutrition): moderate depletion  Muscle Mass (Malnutrition): moderate depletion     Reason for Assessment    Reason For Assessment: RD follow-up  Diagnosis: other (see comments) (Shock)  Relevant Medical History: HTN, HLD, DM  Interdisciplinary Rounds: did not attend    General Information Comments: Poor appetite continues 2/2 nausea & vomiting - ONS ordered. Pt w/ decreased appetite PTA & UBW of 155#. Pt meets criteria for moderate malnutrition; please see PES statement for details. NFPE complete 8/24.  Nutrition Discharge Planning: Adequate PO intake    Nutrition/Diet History    Spiritual, Cultural Beliefs, Yarsanism Practices, Values that Affect Care:  "no  Food Allergies: milk  Factors Affecting Nutritional Intake: decreased appetite, nausea/vomiting    Anthropometrics    Temp: 97.8 °F (36.6 °C)  Height: 5' 4" (162.6 cm)  Height (inches): 64 in  Weight Method: Bed Scale  Weight: 58.9 kg (129 lb 13.6 oz)  Weight (lb): 129.85 lb  Ideal Body Weight (IBW), Female: 120 lb  % Ideal Body Weight, Female (lb): 108.21 %  BMI (Calculated): 22.3  BMI Grade: 18.5-24.9 - normal  Usual Body Weight (UBW), k.5 kg  % Usual Body Weight: 85.21  % Weight Change From Usual Weight: -14.97 %    Lab/Procedures/Meds    Pertinent Labs Reviewed: reviewed  Pertinent Labs Comments: A1C 8.8 (7/10)  Pertinent Medications Reviewed: reviewed  Pertinent Medications Comments: Statin    Estimated/Assessed Needs    Weight Used For Calorie Calculations: 59 kg (130 lb 1.1 oz)    Energy Calorie Requirements (kcal): 1770 kcal/d  Energy Need Method: Kcal/kg (30 kcal/kg)    Protein Requirements: 77 g/d (1.3 g/kg)  Weight Used For Protein Calculations: 59 kg (130 lb 1.1 oz)    Estimated Fluid Requirement Method: other (see comments) (Per MD or 1 mL/kcal)  RDA Method (mL): 1770    Nutrition Prescription Ordered    Current Diet Order: 2000 kcal ADA, Low Na  Nutrition Order Comments: 2000 mL FR  Oral Nutrition Supplement: Boost Glucose Control, Luisa Melendez    Evaluation of Received Nutrient/Fluid Intake    I/O: +2.2L since     Comments: LBM:     Tolerance: tolerating    Nutrition Risk    Level of Risk/Frequency of Follow-up:  (1x/week)     Monitor and Evaluation    Food and Nutrient Intake: food and beverage intake, energy intake  Food and Nutrient Adminstration: diet order  Physical Activity and Function: nutrition-related ADLs and IADLs  Anthropometric Measurements: weight, weight change  Biochemical Data, Medical Tests and Procedures: glucose/endocrine profile, lipid profile, inflammatory profile, gastrointestinal profile, electrolyte and renal panel  Nutrition-Focused Physical Findings: overall " appearance     Nutrition Follow-Up    RD Follow-up?: Yes

## 2023-09-01 VITALS
TEMPERATURE: 98 F | WEIGHT: 131.38 LBS | BODY MASS INDEX: 22.43 KG/M2 | DIASTOLIC BLOOD PRESSURE: 87 MMHG | HEIGHT: 64 IN | SYSTOLIC BLOOD PRESSURE: 158 MMHG | HEART RATE: 85 BPM | OXYGEN SATURATION: 100 % | RESPIRATION RATE: 25 BRPM

## 2023-09-01 PROBLEM — R11.0 NAUSEA: Status: RESOLVED | Noted: 2023-08-27 | Resolved: 2023-09-01

## 2023-09-01 PROBLEM — R57.9 SHOCK: Status: RESOLVED | Noted: 2023-08-23 | Resolved: 2023-09-01

## 2023-09-01 PROBLEM — I21.3 STEMI (ST ELEVATION MYOCARDIAL INFARCTION): Status: RESOLVED | Noted: 2023-08-16 | Resolved: 2023-09-01

## 2023-09-01 PROBLEM — K83.09 CHOLANGITIS: Status: RESOLVED | Noted: 2023-08-27 | Resolved: 2023-09-01

## 2023-09-01 LAB
ALBUMIN SERPL BCP-MCNC: 1.6 G/DL (ref 3.5–5.2)
ALP SERPL-CCNC: 833 U/L (ref 55–135)
ALT SERPL W/O P-5'-P-CCNC: 24 U/L (ref 10–44)
ANION GAP SERPL CALC-SCNC: 11 MMOL/L (ref 8–16)
APTT PPP: 39 SEC (ref 21–32)
AST SERPL-CCNC: 73 U/L (ref 10–40)
BASOPHILS # BLD AUTO: 0.02 K/UL (ref 0–0.2)
BASOPHILS NFR BLD: 0.2 % (ref 0–1.9)
BILIRUB SERPL-MCNC: 2.4 MG/DL (ref 0.1–1)
BUN SERPL-MCNC: 10 MG/DL (ref 8–23)
CALCIUM SERPL-MCNC: 8.5 MG/DL (ref 8.7–10.5)
CHLORIDE SERPL-SCNC: 106 MMOL/L (ref 95–110)
CO2 SERPL-SCNC: 23 MMOL/L (ref 23–29)
CREAT SERPL-MCNC: 0.8 MG/DL (ref 0.5–1.4)
DIFFERENTIAL METHOD: ABNORMAL
EOSINOPHIL # BLD AUTO: 0.2 K/UL (ref 0–0.5)
EOSINOPHIL NFR BLD: 1.7 % (ref 0–8)
ERYTHROCYTE [DISTWIDTH] IN BLOOD BY AUTOMATED COUNT: 20 % (ref 11.5–14.5)
EST. GFR  (NO RACE VARIABLE): >60 ML/MIN/1.73 M^2
GLUCOSE SERPL-MCNC: 130 MG/DL (ref 70–110)
HCT VFR BLD AUTO: 31.1 % (ref 37–48.5)
HGB BLD-MCNC: 9.5 G/DL (ref 12–16)
IMM GRANULOCYTES # BLD AUTO: 0.1 K/UL (ref 0–0.04)
IMM GRANULOCYTES NFR BLD AUTO: 0.8 % (ref 0–0.5)
INR PPP: 1.3 (ref 0.8–1.2)
LYMPHOCYTES # BLD AUTO: 1.4 K/UL (ref 1–4.8)
LYMPHOCYTES NFR BLD: 10.3 % (ref 18–48)
MAGNESIUM SERPL-MCNC: 2.1 MG/DL (ref 1.6–2.6)
MCH RBC QN AUTO: 25.1 PG (ref 27–31)
MCHC RBC AUTO-ENTMCNC: 30.5 G/DL (ref 32–36)
MCV RBC AUTO: 82 FL (ref 82–98)
MONOCYTES # BLD AUTO: 0.7 K/UL (ref 0.3–1)
MONOCYTES NFR BLD: 4.9 % (ref 4–15)
NEUTROPHILS # BLD AUTO: 11 K/UL (ref 1.8–7.7)
NEUTROPHILS NFR BLD: 82.1 % (ref 38–73)
NRBC BLD-RTO: 0 /100 WBC
PLATELET # BLD AUTO: 264 K/UL (ref 150–450)
PMV BLD AUTO: 12 FL (ref 9.2–12.9)
POCT GLUCOSE: 104 MG/DL (ref 70–110)
POCT GLUCOSE: 165 MG/DL (ref 70–110)
POCT GLUCOSE: 170 MG/DL (ref 70–110)
POTASSIUM SERPL-SCNC: 3 MMOL/L (ref 3.5–5.1)
PROT SERPL-MCNC: 7.1 G/DL (ref 6–8.4)
PROTHROMBIN TIME: 13.7 SEC (ref 9–12.5)
RBC # BLD AUTO: 3.79 M/UL (ref 4–5.4)
SODIUM SERPL-SCNC: 140 MMOL/L (ref 136–145)
WBC # BLD AUTO: 13.31 K/UL (ref 3.9–12.7)

## 2023-09-01 PROCEDURE — 85610 PROTHROMBIN TIME: CPT | Performed by: STUDENT IN AN ORGANIZED HEALTH CARE EDUCATION/TRAINING PROGRAM

## 2023-09-01 PROCEDURE — 63600175 PHARM REV CODE 636 W HCPCS: Performed by: STUDENT IN AN ORGANIZED HEALTH CARE EDUCATION/TRAINING PROGRAM

## 2023-09-01 PROCEDURE — 85025 COMPLETE CBC W/AUTO DIFF WBC: CPT | Performed by: STUDENT IN AN ORGANIZED HEALTH CARE EDUCATION/TRAINING PROGRAM

## 2023-09-01 PROCEDURE — 80053 COMPREHEN METABOLIC PANEL: CPT

## 2023-09-01 PROCEDURE — 25000003 PHARM REV CODE 250: Performed by: STUDENT IN AN ORGANIZED HEALTH CARE EDUCATION/TRAINING PROGRAM

## 2023-09-01 PROCEDURE — 99239 PR HOSPITAL DISCHARGE DAY,>30 MIN: ICD-10-PCS | Mod: ,,, | Performed by: INTERNAL MEDICINE

## 2023-09-01 PROCEDURE — 93005 ELECTROCARDIOGRAM TRACING: CPT

## 2023-09-01 PROCEDURE — 93010 ELECTROCARDIOGRAM REPORT: CPT | Mod: ,,, | Performed by: INTERNAL MEDICINE

## 2023-09-01 PROCEDURE — 83735 ASSAY OF MAGNESIUM: CPT | Performed by: STUDENT IN AN ORGANIZED HEALTH CARE EDUCATION/TRAINING PROGRAM

## 2023-09-01 PROCEDURE — 25000003 PHARM REV CODE 250: Performed by: INTERNAL MEDICINE

## 2023-09-01 PROCEDURE — 63600175 PHARM REV CODE 636 W HCPCS

## 2023-09-01 PROCEDURE — 93010 EKG 12-LEAD: ICD-10-PCS | Mod: ,,, | Performed by: INTERNAL MEDICINE

## 2023-09-01 PROCEDURE — 94761 N-INVAS EAR/PLS OXIMETRY MLT: CPT

## 2023-09-01 PROCEDURE — 36415 COLL VENOUS BLD VENIPUNCTURE: CPT | Performed by: INTERNAL MEDICINE

## 2023-09-01 PROCEDURE — 99239 HOSP IP/OBS DSCHRG MGMT >30: CPT | Mod: ,,, | Performed by: INTERNAL MEDICINE

## 2023-09-01 PROCEDURE — 85730 THROMBOPLASTIN TIME PARTIAL: CPT | Performed by: INTERNAL MEDICINE

## 2023-09-01 PROCEDURE — 97116 GAIT TRAINING THERAPY: CPT

## 2023-09-01 PROCEDURE — 94799 UNLISTED PULMONARY SVC/PX: CPT

## 2023-09-01 RX ORDER — POTASSIUM CHLORIDE 20 MEQ/1
40 TABLET, EXTENDED RELEASE ORAL ONCE
Status: COMPLETED | OUTPATIENT
Start: 2023-09-01 | End: 2023-09-01

## 2023-09-01 RX ORDER — ASPIRIN 81 MG/1
81 TABLET ORAL DAILY
Qty: 60 TABLET | Refills: 3 | Status: SHIPPED | OUTPATIENT
Start: 2023-09-02 | End: 2024-04-29

## 2023-09-01 RX ORDER — HYDROCODONE BITARTRATE AND ACETAMINOPHEN 5; 325 MG/1; MG/1
1 TABLET ORAL EVERY 6 HOURS PRN
Qty: 20 TABLET | Refills: 0 | Status: SHIPPED | OUTPATIENT
Start: 2023-09-01 | End: 2023-09-06

## 2023-09-01 RX ORDER — PROCHLORPERAZINE MALEATE 5 MG
5 TABLET ORAL 3 TIMES DAILY PRN
Qty: 30 TABLET | Refills: 0 | Status: SHIPPED | OUTPATIENT
Start: 2023-09-01 | End: 2023-09-11

## 2023-09-01 RX ORDER — BENZONATATE 100 MG/1
100 CAPSULE ORAL 3 TIMES DAILY PRN
Qty: 30 CAPSULE | Refills: 0 | Status: SHIPPED | OUTPATIENT
Start: 2023-09-01 | End: 2023-09-11

## 2023-09-01 RX ORDER — SCOLOPAMINE TRANSDERMAL SYSTEM 1 MG/1
1 PATCH, EXTENDED RELEASE TRANSDERMAL
Qty: 10 PATCH | Refills: 0 | Status: SHIPPED | OUTPATIENT
Start: 2023-09-01 | End: 2023-10-01

## 2023-09-01 RX ORDER — CLOPIDOGREL BISULFATE 75 MG/1
75 TABLET ORAL DAILY
Qty: 30 TABLET | Refills: 3 | Status: SHIPPED | OUTPATIENT
Start: 2023-09-02 | End: 2023-12-31

## 2023-09-01 RX ADMIN — SCOPALAMINE 1 PATCH: 1 PATCH, EXTENDED RELEASE TRANSDERMAL at 09:09

## 2023-09-01 RX ADMIN — PROMETHAZINE HYDROCHLORIDE 12.5 MG: 12.5 TABLET ORAL at 02:09

## 2023-09-01 RX ADMIN — CIPROFLOXACIN 400 MG: 2 INJECTION, SOLUTION INTRAVENOUS at 11:09

## 2023-09-01 RX ADMIN — PROCHLORPERAZINE MALEATE 5 MG: 5 TABLET ORAL at 09:09

## 2023-09-01 RX ADMIN — ENOXAPARIN SODIUM 40 MG: 40 INJECTION SUBCUTANEOUS at 05:09

## 2023-09-01 RX ADMIN — CLOPIDOGREL BISULFATE 75 MG: 75 TABLET ORAL at 08:09

## 2023-09-01 RX ADMIN — ASPIRIN 81 MG: 81 TABLET, COATED ORAL at 08:09

## 2023-09-01 RX ADMIN — METRONIDAZOLE 500 MG: 500 INJECTION, SOLUTION INTRAVENOUS at 03:09

## 2023-09-01 RX ADMIN — PROMETHAZINE HYDROCHLORIDE 12.5 MG: 12.5 TABLET ORAL at 06:09

## 2023-09-01 RX ADMIN — ESCITALOPRAM OXALATE 10 MG: 10 TABLET ORAL at 08:09

## 2023-09-01 RX ADMIN — LOSARTAN POTASSIUM 25 MG: 25 TABLET, FILM COATED ORAL at 08:09

## 2023-09-01 RX ADMIN — METRONIDAZOLE 500 MG: 500 INJECTION, SOLUTION INTRAVENOUS at 10:09

## 2023-09-01 RX ADMIN — POTASSIUM CHLORIDE 40 MEQ: 1500 TABLET, EXTENDED RELEASE ORAL at 09:09

## 2023-09-01 NOTE — PLAN OF CARE
Problem: Diabetes Comorbidity  Goal: Blood Glucose Level Within Targeted Range  9/1/2023 0525 by Renato Howard RN  Outcome: Ongoing, Progressing  9/1/2023 0523 by Renato Howard RN  Outcome: Ongoing, Progressing     Problem: Fluid and Electrolyte Imbalance (Acute Kidney Injury/Impairment)  Goal: Fluid and Electrolyte Balance  9/1/2023 0525 by Renato Howard RN  Outcome: Ongoing, Progressing  9/1/2023 0523 by Renato Howard RN  Outcome: Ongoing, Progressing     Problem: Oral Intake Inadequate (Acute Kidney Injury/Impairment)  Goal: Optimal Nutrition Intake  9/1/2023 0525 by Renato Howard RN  Outcome: Ongoing, Progressing  9/1/2023 0523 by Renato Howard RN  Outcome: Ongoing, Progressing     Problem: Renal Function Impairment (Acute Kidney Injury/Impairment)  Goal: Effective Renal Function  9/1/2023 0525 by Renato Howard RN  Outcome: Ongoing, Progressing  9/1/2023 0523 by Renato Howard RN  Outcome: Ongoing, Progressing     Problem: Adult Inpatient Plan of Care  Goal: Plan of Care Review  9/1/2023 0525 by Renato Howard RN  Outcome: Ongoing, Progressing  9/1/2023 0523 by Renato Howard RN  Outcome: Ongoing, Progressing  Goal: Patient-Specific Goal (Individualized)  9/1/2023 0525 by Renato Howard RN  Outcome: Ongoing, Progressing  9/1/2023 0523 by Renato Howard RN  Outcome: Ongoing, Progressing  Goal: Absence of Hospital-Acquired Illness or Injury  9/1/2023 0525 by Renato Howard RN  Outcome: Ongoing, Progressing  9/1/2023 0523 by Renato Howard RN  Outcome: Ongoing, Progressing  Goal: Optimal Comfort and Wellbeing  9/1/2023 0525 by Renato Howard RN  Outcome: Ongoing, Progressing  9/1/2023 0523 by Renato Howard RN  Outcome: Ongoing, Progressing  Goal: Readiness for Transition of Care  9/1/2023 0525 by Renato Howard RN  Outcome: Ongoing, Progressing  9/1/2023 0523 by Renato Howard RN  Outcome: Ongoing, Progressing     Problem: Fall Injury Risk  Goal: Absence of Fall and Fall-Related  Injury  9/1/2023 0525 by Renato Howard RN  Outcome: Ongoing, Progressing  9/1/2023 0523 by Renato Howard RN  Outcome: Ongoing, Progressing     Problem: Skin Injury Risk Increased  Goal: Skin Health and Integrity  9/1/2023 0525 by Renato Howard RN  Outcome: Ongoing, Progressing  9/1/2023 0523 by Renato Howard RN  Outcome: Ongoing, Progressing     Problem: Adjustment to Illness (Sepsis/Septic Shock)  Goal: Optimal Coping  9/1/2023 0525 by Renato Howard RN  Outcome: Ongoing, Progressing  9/1/2023 0523 by Renato Howard RN  Outcome: Ongoing, Progressing     Problem: Bleeding (Sepsis/Septic Shock)  Goal: Absence of Bleeding  9/1/2023 0525 by Renato Howard RN  Outcome: Ongoing, Progressing  9/1/2023 0523 by Renato Howard RN  Outcome: Ongoing, Progressing     Problem: Glycemic Control Impaired (Sepsis/Septic Shock)  Goal: Blood Glucose Level Within Desired Range  9/1/2023 0525 by Renato Howard RN  Outcome: Ongoing, Progressing  9/1/2023 0523 by Renato Howard RN  Outcome: Ongoing, Progressing     Problem: Infection Progression (Sepsis/Septic Shock)  Goal: Absence of Infection Signs and Symptoms  9/1/2023 0525 by Renato Howard RN  Outcome: Ongoing, Progressing  9/1/2023 0523 by Renato Howard RN  Outcome: Ongoing, Progressing     Problem: Nutrition Impaired (Sepsis/Septic Shock)  Goal: Optimal Nutrition Intake  9/1/2023 0525 by Renato Howard RN  Outcome: Ongoing, Progressing  9/1/2023 0523 by Renato Howard RN  Outcome: Ongoing, Progressing     Problem: Infection  Goal: Absence of Infection Signs and Symptoms  9/1/2023 0525 by Renato Howard RN  Outcome: Ongoing, Progressing  9/1/2023 0523 by Renato Howard RN  Outcome: Ongoing, Progressing

## 2023-09-01 NOTE — PT/OT/SLP PROGRESS
Physical Therapy Treatment    Patient Name:  Mariann Huff   MRN:  [id number]    Recommendations:     Discharge Recommendations: nursing facility, skilled  Discharge Equipment Recommendations: rollator  Barriers to discharge: None    Assessment:     Mariann Huff is a 63 y.o. female admitted with a medical diagnosis of Physical debility.  She presents with the following impairments/functional limitations: weakness, impaired endurance, impaired self care skills, impaired functional mobility, decreased lower extremity function, gait instability, impaired balance.    Pt agreeable to PT session and tolerated well. Pt requires Min A for transfers with HHA and ambulated 8' using RW with CGA, but is limited by fatigue. Pt would benefit from rollator w/ seat & brakes upon D/C home to take seated rest breaks during household/community ambulation.     Rehab Prognosis: Good; patient would benefit from acute skilled PT services to address these deficits and reach maximum level of function.    Recent Surgery: Procedure(s) (LRB):  ERCP (ENDOSCOPIC RETROGRADE CHOLANGIOPANCREATOGRAPHY) (N/A) 10 Days Post-Op    Plan:     During this hospitalization, patient to be seen 3 x/week to address the identified rehab impairments via gait training, therapeutic activities, therapeutic exercises, neuromuscular re-education and progress toward the following goals:    Plan of Care Expires:  09/14/23    Subjective     Chief Complaint: Diarrhea   Patient/Family Comments/goals:  reports pt has been using the bathroom all morning  Pain/Comfort:  Pain Rating 1: 0/10      Objective:     Communicated with nurse prior to session.  Patient found ambulatory in room/morales with blood pressure cuff, pulse ox (continuous), peripheral IV, telemetry upon PT entry to room.     General Precautions: Standard, fall  Orthopedic Precautions: N/A  Braces: N/A  Respiratory Status: Room air     Functional Mobility:  Transfers:     Sit to Stand:  minimum  assistance with hand-held assist  Stand to Sit: Min A with RW  Gait: x8' in room with CGA using RW  Balance:   Static Standing: CGA w/ HHA; SBA w/ RW  Able to tolerate ~4' of standing activity prior to requiring rest break      AM-PAC 6 CLICK MOBILITY  Turning over in bed (including adjusting bedclothes, sheets and blankets)?: 3  Sitting down on and standing up from a chair with arms (e.g., wheelchair, bedside commode, etc.): 3  Moving from lying on back to sitting on the side of the bed?: 3  Moving to and from a bed to a chair (including a wheelchair)?: 3  Need to walk in hospital room?: 3  Climbing 3-5 steps with a railing?: 2  Basic Mobility Total Score: 17       Treatment & Education:  Toileting on BSC,  assisting with pericare.  Patient educated on importance/benefits of sitting up in chair.  All questions answered within PT scope of practice.     Patient left up in chair with all lines intact, call button in reach, nurse notified, and  present..    GOALS:   Multidisciplinary Problems       Physical Therapy Goals          Problem: Physical Therapy    Goal Priority Disciplines Outcome Goal Variances Interventions   Physical Therapy Goal     PT, PT/OT Ongoing, Progressing     Description: Goals to be met by: 23     Patient will increase functional independence with mobility by performin. Supine to sit with [place]  2. Sit to stand transfer with Supervision  3. Bed to chair transfer with Supervision using LRAD  4. Gait  x 75 feet with Stand-by Assistance using LRAD  5. Ascend/Descend 6 inch curb step with Stand-by Assistance with/without LRAD  6. Lower extremity exercise program x30 reps per handout, with independence                         Time Tracking:     PT Received On: 23  PT Start Time: 903     PT Stop Time: 917  PT Total Time (min): 14 min     Billable Minutes: Gait Training 14    Treatment Type: Treatment  PT/PTA: PT     Number of PTA visits since last PT visit: 0      09/01/2023

## 2023-09-01 NOTE — PLAN OF CARE
stated she will message provider and call patient  back with schedule hospital follow up appointment.

## 2023-09-01 NOTE — PLAN OF CARE
Patient will be discharging home today with home health set up with [facility]. Patient will be getting a rollator delivered to the bedside soon.     Marya Dunne LMSW  [facility]   [id number]

## 2023-09-01 NOTE — PLAN OF CARE
[facility] ([facility])  Discharge Final Note    Primary Care Provider: Jasbir Haney MD    Expected Discharge Date: 9/1/2023    Final Discharge Note (most recent)       Final Note - 09/01/23 1547          Final Note    Assessment Type Final Discharge Note (P)      Anticipated Discharge Disposition Home-Health Care Svc (P)                      Important Message from Medicare  Important Message from Medicare regarding Discharge Appeal Rights: Explained to patient/caregiver, Signed/date by patient/caregiver, Given to patient/caregiver     Date IMM was signed: 09/01/23  Time IMM was signed: 1101    Contact Info       Jasbir Haney MD   Specialty: Internal Medicine   Relationship: PCP - General  Consulting Physician  Hypertension Digital Medicine Responsible Provider  Diabetes Digital Medicine Responsible Provider    [address] [facility] [address]   Phone: [phone]       Next Steps: Follow up          Patient will be discharging home today with home health set up with [facility]. Patient spouse will transport her home.      Future Appointments   Date Time Provider Department Center   9/6/2023  9:00 AM Chelsey Hernandez, PT [facility] REHABOP [facility]   9/8/2023 10:00 AM LAB, HEMONC SAME DAY [facility] LAB HO [facility] Tyler   9/8/2023 11:00 AM Sean Mccauley MD [facility] HEMONC2 [facility] Cantiara   9/20/2023 10:00 AM Jasbir Haney MD [facility] Gwyn Dunne, LMSW [facility]   [id number]

## 2023-09-01 NOTE — NURSING
Patient stable throughout the night. IV removed and new IV placed in LFA. Patient had no complaints of pain throughout the night. VS remained stable. No N/V. Will continue to monitor patient until shift change.

## 2023-09-01 NOTE — DISCHARGE SUMMARY
[facility] - Intensive [facility] ([place] [address])  [facility] Medicine  Discharge Summary      Patient Name: Mariann Huff  MRN: [id number]  Admission Date: 8/14/2023  Hospital Length of Stay: 18 days  Discharge Date and Time:  09/01/2023 9:16 AM  Attending Physician: Francisco Hyman MD   Discharging Provider: Francisco Hyman MD  Discharge Provider Team: [facility]  Primary Care Provider: Jasbir Haney MD          Procedure(s) (LRB):  ERCP (ENDOSCOPIC RETROGRADE CHOLANGIOPANCREATOGRAPHY) (N/A)      Hospital Course: 63 y.o. female with CAD s/p GINGER to ostial LAD in 2014, HTN, HLD, DM2, MURTAZA who presented to the ED to the ER after a near syncopal event. Here her ECG showed the posterior changes for which posterior EKG was obtained with showed STEMI. Bedside echo showed an EF 45-50% and norma-lateral and infero-lateral hypokinesis. She was taken to cath lab and her symptoms resolved after PCI.  Patient developed an acute abdominal pain associated with nausea and vomiting. She was recently admitted (6/23) for suspected biliary pancreatitis and biliary stricture treated with biliary sphincterotomy, biliary stent, and cholecystectomy.  She then had worsening transaminitis w/ elevated T-Bili.   Concern for stent occlusion. Patient amylase 107 wnl, and Lipase 61. Workup will include total abdominal ultrasound and [facility] was consulted . She Was scheduled for outpatient w/u of hepatic lesions found on previous admission 8/2/23.  Patient had ERCP and EUS on 8/17/23. Visualized a partial occluded stent in the biliary tree. One covered metal stent was placed into the common bile duct. Concern for metastatic cancer is high due to multiple liver lesions and mass on pancreatic head per [facility]. Initially on previous hospital admission, it was believed to more likely be liver abscesses rather than malignancy due to acute nature of progression. S/p FNA biopsy with ERCP and stent placement on 8/17.   Patient became hypoglycemic on  8/19 and Hgb dropped from 8 to 6.3 with a reported dark stool, Improved to 7.1 after 1 unit PRBC, , no further episodes of melena and hb was stable. Seen by GI, did not recommend endoscopy    T bili continued to rise on 8/21, KUB did not show stent migration, patient underwent ERCP again on 8/22 which demonstrated a visibly ocluded stent in the biliary tree with a single biliary stricture in main BD. One covered metal stent placed in CBD. Several hours following this procedure, patient developed rapid onset fever to 103 and rapid drop in BP from SBP 180s to SBP 80s. Patient admitted to MICU for further management. She was continued on vanc/cefepime, briefly on levophed and then weaned off. Still with intermittent AMS and elevateed WBC, febrile 8/25. Adjusted abx to vanc/cipro/flagyl with improvement in mentation. Oral intake still poor, PAPA with Cr 1.4 on 8/26, improved with IVF.  She was ordered for scopolamine patch and prochlorperazine for nausea and vomiting which improved, she was able to tolerate diet.  She completed IV antibiotics per GI recommendations, blood cultures were no growth leukocytosis improved  She complain of some wet cough post intubation for procedure, she was ordered for incentive spirometry and was taught how to use it and was given upon discharge.  She was also ordered for antitussive medication.  She has an appointment to see and establish care with oncologist today, if she is unable to make it appointment to be rescheduled,  on board.  Referral given for GI and cardiology follow up.  Referral given for cardiac rehab.  Patient was seen by therapy recommends skilled nursing facility, however patient and  requested to do home with home health aide service.  She had QTC prolongation, her Lexapro was held.  Her losartan was resumed and she was discharged on aspirin and Plavix         Physical Exam  Constitutional:       General: She is not in acute distress.     Appearance:  She is obese.   HENT:      Head: Normocephalic.      Right Ear: External ear normal.      Left Ear: External ear normal.      Nose: Nose normal.   Cardiovascular:      Rate and Rhythm: Normal rate.   Pulmonary:      Effort: Pulmonary effort is normal. Normal Breath sounds  Abdominal:      Palpations: Abdomen is soft.      Tenderness: There is no abdominal tenderness.   Musculoskeletal:      Comments: Debility   Skin:     General: Skin is warm.   Neurological:      Mental Status: She is alert and oriented to person, place, and time.   Psychiatric:         Mood and Affect: Mood normal.     Consults:   Consults (From admission, onward)          Status Ordering Provider     Inpatient consult to Midline team  Once        Provider:  (Not yet assigned)    Completed LEON HUNT     Inpatient consult to Midline team  Once        Provider:  (Not yet assigned)    Completed KRISTINA GALICIA     Inpatient consult to Cardiology  Once        Provider:  (Not yet assigned)    Completed LILIANA ASIF     Inpatient consult to Critical Care Medicine  Once        Provider:  (Not yet assigned)    Completed LEON HUNT     Inpatient consult to Gastroenterology  Once        Provider:  (Not yet assigned)    Completed LEON HUNT     Inpatient consult to Midline team  Once        Provider:  (Not yet assigned)    Completed LEON HUNT     Inpatient consult to [facility] ([facility])  Once        Provider:  (Not yet assigned)    Completed ALFRED DEL ROSARIO     Inpatient consult to Midline team  Once        Provider:  (Not yet assigned)    Completed RUDY DEAN     Inpatient consult to Endocrinology  Once        Provider:  (Not yet assigned)    Completed ARMANDO ADAMES            Final Active Diagnoses:    Diagnosis Date Noted POA    PRINCIPAL PROBLEM:  Physical debility [R53.81] 08/31/2023 No    Pancreatic cancer [C25.9] 08/23/2023 Yes    Atrial fibrillation [I48.91] 08/19/2023 Yes    Biliary obstruction  [K83.1] 08/18/2023 Yes    CHF (congestive heart failure) [I50.9] 08/16/2023 No    Other specified anemias [D64.89] 08/02/2023 Yes    NSTEMI (non-ST elevated myocardial infarction) [I21.4] 08/02/2023 Yes    Hyperbilirubinemia [E80.6] 06/12/2023 Yes    History of pancreatitis [Z87.19] 06/10/2023 Not Applicable    Moderate malnutrition [E44.0]  Yes    Type 2 diabetes mellitus with stage 2 chronic kidney disease and hypertension [E11.22, I12.9, N18.2] 04/20/2019 Yes     Chronic    Sleep apnea [G47.30]  Yes     Chronic      Problems Resolved During this Admission:    Diagnosis Date Noted Date Resolved POA    Cholangitis [K83.09] 08/27/2023 09/01/2023 Yes    Nausea [R11.0] 08/27/2023 09/01/2023 Yes    Shock [R57.9] 08/23/2023 09/01/2023 Yes    Gastrointestinal hemorrhage with melena [K92.1] 08/20/2023 08/23/2023 No    Anemia [D64.9] 08/20/2023 08/25/2023 Yes    STEMI (ST elevation myocardial infarction) [I21.3] 08/16/2023 09/01/2023 Yes    Abdominal pain [R10.9] 08/16/2023 08/23/2023 No    Liver lesion [K76.9] 08/02/2023 08/25/2023 Yes    Elevated troponin [R77.8] 06/06/2019 08/25/2023 Yes    Altered mental status [R41.82]  08/31/2023 Yes    PAPA (acute kidney injury) [N17.9] 02/17/2014 09/01/2023 Yes      Discharged Condition: fair    Disposition: Home-Health Care c    Follow Up:    Patient Instructions:      ACCEPT - Ambulatory referral/consult to [facility]   Standing Status: Future   Referral Priority: Routine Referral Type: Consultation   Referral Reason: Specialty Services Required   Requested Specialty: Cardiology   Number of Visits Requested: 1     Ambulatory referral/consult to Cardiology   Standing Status: Future   Referral Priority: Routine Referral Type: Consultation   Referral Reason: Specialty Services Required   Requested Specialty: Cardiology   Number of Visits Requested: 1     Ambulatory referral/consult to Gastroenterology   Standing Status: Future   Referral Priority:  "Routine Referral Type: Consultation   Referral Reason: Specialty Services Required   Requested Specialty: Gastroenterology   Number of Visits Requested: 1     Cardiac rehab phase II   Standing Status: Future Standing Exp. Date: 08/14/24     Order Specific Question Answer Comments   Department [facility] CARDIAC REHAB    Select qualifying diagnosis: I21.3 - ST elevation (STEMI) myocardial infarction of unspecified site      Medications:  Reconciled Home Medications:      Medication List        START taking these medications      aspirin 81 MG EC tablet  Commonly known as: ECOTRIN  Take 1 tablet (81 mg total) by mouth once daily.  Start taking on: September 2, 2023     benzonatate 100 MG capsule  Commonly known as: TESSALON  Take 1 capsule (100 mg total) by mouth 3 (three) times daily as needed for Cough.     clopidogreL 75 mg tablet  Commonly known as: PLAVIX  Take 1 tablet (75 mg total) by mouth once daily.  Start taking on: September 2, 2023     HYDROcodone-acetaminophen 5-325 mg per tablet  Commonly known as: NORCO  Take 1 tablet by mouth every 6 (six) hours as needed for Pain.     prochlorperazine 5 MG tablet  Commonly known as: COMPAZINE  Take 1 tablet (5 mg total) by mouth 3 (three) times daily as needed for Nausea.     scopolamine 1.3-1.5 mg (1 mg over 3 days)  Commonly known as: TRANSDERM-SCOP  Place 1 patch onto the skin Every 3 (three) days.            CHANGE how you take these medications      LEVEMIR FLEXPEN 100 unit/mL (3 mL) Inpn pen  Generic drug: insulin detemir U-100 (Levemir)  Inject 14 Units into the skin every evening.  What changed:   how much to take  additional instructions     pen needle, diabetic 32 gauge x 1/5" Ndle  Commonly known as: NOVOTWIST  Use 1 needle to inject insulin four times daily  What changed: Another medication with the same name was removed. Continue taking this medication, and follow the directions you see here.            CONTINUE taking these medications      ACCU-CHEK EDIN " PLUS METER Misc  Generic drug: blood-glucose meter     atorvastatin 40 MG tablet  Commonly known as: LIPITOR  Take 1 tablet (40 mg total) by mouth every evening.     blood sugar diagnostic Strp  Commonly known as: ACCU-CHEK EDIN PLUS TEST STRP  TEST BLOOD SUGARS 4 TIMES DAILY     ELIQUIS 5 mg Tab  Generic drug: apixaban  Take 1 tablet (5 mg total) by mouth 2 (two) times daily.     gabapentin 300 MG capsule  Commonly known as: NEURONTIN  Take 1 capsule (300 mg) in the morning and 2 capsules (600 mg) in the evening     losartan 25 MG tablet  Commonly known as: COZAAR  Take 1 tablet (25 mg total) by mouth once daily.     magnesium oxide 400 mg (241.3 mg magnesium) tablet  Commonly known as: MAG-OX  Take 1 tablet (400 mg total) by mouth 2 (two) times daily.     metoprolol succinate 25 MG 24 hr tablet  Commonly known as: TOPROL-XL  Take 0.5 tablets (12.5 mg total) by mouth once daily.            STOP taking these medications      BRILINTA 90 mg tablet  Generic drug: ticagrelor     cefpodoxime 100 MG tablet  Commonly known as: VANTIN     diclofenac sodium 1 % Gel  Commonly known as: VOLTAREN     EScitalopram oxalate 10 MG tablet  Commonly known as: LEXAPRO     FARXIGA 10 mg tablet  Generic drug: dapagliflozin propanediol     furosemide 40 MG tablet  Commonly known as: LASIX     glipiZIDE 10 MG Tr24  Commonly known as: GLUCOTROL     spironolactone 25 MG tablet  Commonly known as: ALDACTONE                  Pending Diagnostic Studies:       Procedure Component Value Units Date/Time    Protime-INR [[id number]] Collected: 09/01/23 0809    Order Status: Sent Lab Status: In process Updated: 09/01/23 0827    Specimen: Blood     Narrative:      Collection has been rescheduled by [facility] at 09/01/2023 05:10 Reason:   Unable to collect.hard stick.spoke with nurse betts          Indwelling Lines/Drains at time of discharge:   Lines/Drains/Airways       None                   Time spent on the discharge of patient: 40 minutes          Francisco Hyman MD  Department of Hospital Medicine  [facility] - Intensive Care ([place]-14)

## 2023-09-01 NOTE — CLINICAL REVIEW
IP Sepsis Screen (most recent)       Sepsis Screen (IP) - 09/01/23 6463       Is the patient's history or complaint suggestive of a possible infection? Yes  -DD    Are there at least two of the following signs and symptoms present? Yes  -DD    Sepsis signs/symptoms - Tachypnea Tachypnea     >20  -DD    Sepsis signs/symptoms - WBC WBC < 4,000 or WBC > 12,000  -DD    Are any of the following organ dysfunction criteria present and not considered to be due to a chronic condition? Yes  -DD    Organ Dysfunction Criteria Total Bilirubin > 2.0 Platelet count < 100,000  -DD    Initiate Sepsis Protocol No  -DD    Reason sepsis not considered Pt. receiving appropriate management  -DD              User Key  (r) = Recorded By, (t) = Taken By, (c) = Cosigned By      Initials Name    Seferino Koenig RN

## 2023-09-04 PROCEDURE — G0180 PR HOME HEALTH MD CERTIFICATION: ICD-10-PCS | Mod: ,,, | Performed by: INTERNAL MEDICINE

## 2023-09-04 PROCEDURE — G0180 MD CERTIFICATION HHA PATIENT: HCPCS | Mod: ,,, | Performed by: INTERNAL MEDICINE

## 2023-09-05 ENCOUNTER — PATIENT OUTREACH (OUTPATIENT)
Dept: ADMINISTRATIVE | Facility: CLINIC | Age: 63
End: 2023-09-05
Payer: COMMERCIAL

## 2023-09-05 ENCOUNTER — OUTPATIENT CASE MANAGEMENT (OUTPATIENT)
Dept: ADMINISTRATIVE | Facility: OTHER | Age: 63
End: 2023-09-05
Payer: COMMERCIAL

## 2023-09-05 NOTE — PROGRESS NOTES
C3 nurse attempted to contact Mariann Huff for a TCC post hospital discharge follow up call. No answer. Left voicemail with callback information. The patient has a scheduled HOSFU appointment with Jasbir Haney MD on 09/20/23 @ 1000.

## 2023-09-06 ENCOUNTER — PATIENT OUTREACH (OUTPATIENT)
Dept: ADMINISTRATIVE | Facility: HOSPITAL | Age: 63
End: 2023-09-06
Payer: COMMERCIAL

## 2023-09-06 ENCOUNTER — TELEPHONE (OUTPATIENT)
Dept: CARDIOLOGY | Facility: CLINIC | Age: 63
End: 2023-09-06
Payer: COMMERCIAL

## 2023-09-06 DIAGNOSIS — Z12.31 ENCOUNTER FOR SCREENING MAMMOGRAM FOR MALIGNANT NEOPLASM OF BREAST: Primary | ICD-10-CM

## 2023-09-06 NOTE — PROGRESS NOTES
2nd attempt to make TCC Call. Left voicemail. Please call [phone] leave your first and last name and date of birth for Latisha. I will return your call.

## 2023-09-06 NOTE — PROGRESS NOTES
Health Maintenance Due   Topic Date Due    Shingles Vaccine (1 of 2) Never done    Pneumococcal Vaccines (Age 0-64) (3 - PCV) 12/06/2020    Foot Exam  04/20/2022    Eye Exam  02/24/2023    Influenza Vaccine (1) 09/01/2023    Mammogram  11/15/2023     Chart reviewed.   Immunizations: Reconciled  Orders placed: Mammogram  Upcoming appts to satisfy JENNIFER topics: N/A

## 2023-09-06 NOTE — TELEPHONE ENCOUNTER
RN attempted to phone enroll pt, call was answered then disconnected. RN will attempt to enroll pt on 9/7/23.

## 2023-09-06 NOTE — PROGRESS NOTES
C3 nurse spoke with Mariann Huff for a TCC post hospital discharge follow up call. The patient has a scheduled HOSFU appointment with Jasbir Haney MD on 09/20/23 @ 1000.     Will message PCP to move Mariann Huff' HOSPFU within 5-7 days of discharge on 09/01/23.

## 2023-09-08 ENCOUNTER — OFFICE VISIT (OUTPATIENT)
Dept: HEMATOLOGY/ONCOLOGY | Facility: CLINIC | Age: 63
End: 2023-09-08
Payer: COMMERCIAL

## 2023-09-08 ENCOUNTER — LAB VISIT (OUTPATIENT)
Dept: LAB | Facility: HOSPITAL | Age: 63
End: 2023-09-08
Attending: INTERNAL MEDICINE
Payer: COMMERCIAL

## 2023-09-08 VITALS
SYSTOLIC BLOOD PRESSURE: 126 MMHG | HEART RATE: 98 BPM | DIASTOLIC BLOOD PRESSURE: 62 MMHG | TEMPERATURE: 98 F | BODY MASS INDEX: 20.6 KG/M2 | OXYGEN SATURATION: 96 % | HEIGHT: 64 IN | WEIGHT: 120.69 LBS

## 2023-09-08 DIAGNOSIS — K83.1 BILIARY OBSTRUCTION: ICD-10-CM

## 2023-09-08 DIAGNOSIS — I73.9 PAD (PERIPHERAL ARTERY DISEASE): Chronic | ICD-10-CM

## 2023-09-08 DIAGNOSIS — Z79.4 TYPE 2 DIABETES MELLITUS WITH STAGE 3 CHRONIC KIDNEY DISEASE, WITH LONG-TERM CURRENT USE OF INSULIN, UNSPECIFIED WHETHER STAGE 3A OR 3B CKD: ICD-10-CM

## 2023-09-08 DIAGNOSIS — C78.7 METASTASES TO THE LIVER: ICD-10-CM

## 2023-09-08 DIAGNOSIS — E11.22 TYPE 2 DIABETES MELLITUS WITH STAGE 3 CHRONIC KIDNEY DISEASE, WITH LONG-TERM CURRENT USE OF INSULIN, UNSPECIFIED WHETHER STAGE 3A OR 3B CKD: ICD-10-CM

## 2023-09-08 DIAGNOSIS — C25.9 PANCREATIC ADENOCARCINOMA: Primary | ICD-10-CM

## 2023-09-08 DIAGNOSIS — N18.30 TYPE 2 DIABETES MELLITUS WITH STAGE 3 CHRONIC KIDNEY DISEASE, WITH LONG-TERM CURRENT USE OF INSULIN, UNSPECIFIED WHETHER STAGE 3A OR 3B CKD: ICD-10-CM

## 2023-09-08 DIAGNOSIS — C25.9 PANCREATIC ADENOCARCINOMA: ICD-10-CM

## 2023-09-08 DIAGNOSIS — I21.4 NSTEMI (NON-ST ELEVATED MYOCARDIAL INFARCTION): ICD-10-CM

## 2023-09-08 LAB
ALBUMIN SERPL BCP-MCNC: 2 G/DL (ref 3.5–5.2)
ALP SERPL-CCNC: 1076 U/L (ref 55–135)
ALT SERPL W/O P-5'-P-CCNC: 28 U/L (ref 10–44)
ANION GAP SERPL CALC-SCNC: 14 MMOL/L (ref 8–16)
AST SERPL-CCNC: 89 U/L (ref 10–40)
BASOPHILS # BLD AUTO: 0.08 K/UL (ref 0–0.2)
BASOPHILS NFR BLD: 0.5 % (ref 0–1.9)
BILIRUB SERPL-MCNC: 2.6 MG/DL (ref 0.1–1)
BUN SERPL-MCNC: 13 MG/DL (ref 8–23)
CALCIUM SERPL-MCNC: 9 MG/DL (ref 8.7–10.5)
CANCER AG19-9 SERPL-ACNC: <2.1 U/ML (ref 0–40)
CHLORIDE SERPL-SCNC: 99 MMOL/L (ref 95–110)
CO2 SERPL-SCNC: 33 MMOL/L (ref 23–29)
CREAT SERPL-MCNC: 0.7 MG/DL (ref 0.5–1.4)
DIFFERENTIAL METHOD: ABNORMAL
EOSINOPHIL # BLD AUTO: 0.2 K/UL (ref 0–0.5)
EOSINOPHIL NFR BLD: 1.1 % (ref 0–8)
ERYTHROCYTE [DISTWIDTH] IN BLOOD BY AUTOMATED COUNT: 24.2 % (ref 11.5–14.5)
EST. GFR  (NO RACE VARIABLE): >60 ML/MIN/1.73 M^2
GLUCOSE SERPL-MCNC: 116 MG/DL (ref 70–110)
HCT VFR BLD AUTO: 40.7 % (ref 37–48.5)
HGB BLD-MCNC: 12 G/DL (ref 12–16)
IMM GRANULOCYTES # BLD AUTO: 0.08 K/UL (ref 0–0.04)
IMM GRANULOCYTES NFR BLD AUTO: 0.5 % (ref 0–0.5)
LYMPHOCYTES # BLD AUTO: 1.7 K/UL (ref 1–4.8)
LYMPHOCYTES NFR BLD: 10.6 % (ref 18–48)
MCH RBC QN AUTO: 25.8 PG (ref 27–31)
MCHC RBC AUTO-ENTMCNC: 29.5 G/DL (ref 32–36)
MCV RBC AUTO: 88 FL (ref 82–98)
MONOCYTES # BLD AUTO: 1.2 K/UL (ref 0.3–1)
MONOCYTES NFR BLD: 7.3 % (ref 4–15)
NEUTROPHILS # BLD AUTO: 12.9 K/UL (ref 1.8–7.7)
NEUTROPHILS NFR BLD: 80 % (ref 38–73)
NRBC BLD-RTO: 0 /100 WBC
PLATELET # BLD AUTO: 429 K/UL (ref 150–450)
PMV BLD AUTO: 12.7 FL (ref 9.2–12.9)
POTASSIUM SERPL-SCNC: 3.8 MMOL/L (ref 3.5–5.1)
PROT SERPL-MCNC: 8.9 G/DL (ref 6–8.4)
RBC # BLD AUTO: 4.65 M/UL (ref 4–5.4)
SODIUM SERPL-SCNC: 146 MMOL/L (ref 136–145)
WBC # BLD AUTO: 16.08 K/UL (ref 3.9–12.7)

## 2023-09-08 PROCEDURE — 3074F PR MOST RECENT SYSTOLIC BLOOD PRESSURE < 130 MM HG: ICD-10-PCS | Mod: CPTII,S$GLB,, | Performed by: INTERNAL MEDICINE

## 2023-09-08 PROCEDURE — 3066F NEPHROPATHY DOC TX: CPT | Mod: CPTII,S$GLB,, | Performed by: INTERNAL MEDICINE

## 2023-09-08 PROCEDURE — 1159F MED LIST DOCD IN RCRD: CPT | Mod: CPTII,S$GLB,, | Performed by: INTERNAL MEDICINE

## 2023-09-08 PROCEDURE — 99204 PR OFFICE/OUTPT VISIT, NEW, LEVL IV, 45-59 MIN: ICD-10-PCS | Mod: S$GLB,,, | Performed by: INTERNAL MEDICINE

## 2023-09-08 PROCEDURE — 3052F HG A1C>EQUAL 8.0%<EQUAL 9.0%: CPT | Mod: CPTII,S$GLB,, | Performed by: INTERNAL MEDICINE

## 2023-09-08 PROCEDURE — 4010F PR ACE/ARB THEARPY RXD/TAKEN: ICD-10-PCS | Mod: CPTII,S$GLB,, | Performed by: INTERNAL MEDICINE

## 2023-09-08 PROCEDURE — 85025 COMPLETE CBC W/AUTO DIFF WBC: CPT | Performed by: INTERNAL MEDICINE

## 2023-09-08 PROCEDURE — 36415 COLL VENOUS BLD VENIPUNCTURE: CPT | Performed by: INTERNAL MEDICINE

## 2023-09-08 PROCEDURE — 99204 OFFICE O/P NEW MOD 45 MIN: CPT | Mod: S$GLB,,, | Performed by: INTERNAL MEDICINE

## 2023-09-08 PROCEDURE — 1159F PR MEDICATION LIST DOCUMENTED IN MEDICAL RECORD: ICD-10-PCS | Mod: CPTII,S$GLB,, | Performed by: INTERNAL MEDICINE

## 2023-09-08 PROCEDURE — 3008F PR BODY MASS INDEX (BMI) DOCUMENTED: ICD-10-PCS | Mod: CPTII,S$GLB,, | Performed by: INTERNAL MEDICINE

## 2023-09-08 PROCEDURE — 3066F PR DOCUMENTATION OF TREATMENT FOR NEPHROPATHY: ICD-10-PCS | Mod: CPTII,S$GLB,, | Performed by: INTERNAL MEDICINE

## 2023-09-08 PROCEDURE — 3061F NEG MICROALBUMINURIA REV: CPT | Mod: CPTII,S$GLB,, | Performed by: INTERNAL MEDICINE

## 2023-09-08 PROCEDURE — 3078F DIAST BP <80 MM HG: CPT | Mod: CPTII,S$GLB,, | Performed by: INTERNAL MEDICINE

## 2023-09-08 PROCEDURE — 3061F PR NEG MICROALBUMINURIA RESULT DOCUMENTED/REVIEW: ICD-10-PCS | Mod: CPTII,S$GLB,, | Performed by: INTERNAL MEDICINE

## 2023-09-08 PROCEDURE — 99999 PR PBB SHADOW E&M-EST. PATIENT-LVL IV: ICD-10-PCS | Mod: PBBFAC,,, | Performed by: INTERNAL MEDICINE

## 2023-09-08 PROCEDURE — 99999 PR PBB SHADOW E&M-EST. PATIENT-LVL IV: CPT | Mod: PBBFAC,,, | Performed by: INTERNAL MEDICINE

## 2023-09-08 PROCEDURE — 4010F ACE/ARB THERAPY RXD/TAKEN: CPT | Mod: CPTII,S$GLB,, | Performed by: INTERNAL MEDICINE

## 2023-09-08 PROCEDURE — 3074F SYST BP LT 130 MM HG: CPT | Mod: CPTII,S$GLB,, | Performed by: INTERNAL MEDICINE

## 2023-09-08 PROCEDURE — 1111F DSCHRG MED/CURRENT MED MERGE: CPT | Mod: CPTII,S$GLB,, | Performed by: INTERNAL MEDICINE

## 2023-09-08 PROCEDURE — 3008F BODY MASS INDEX DOCD: CPT | Mod: CPTII,S$GLB,, | Performed by: INTERNAL MEDICINE

## 2023-09-08 PROCEDURE — 3052F PR MOST RECENT HEMOGLOBIN A1C LEVEL 8.0 - < 9.0%: ICD-10-PCS | Mod: CPTII,S$GLB,, | Performed by: INTERNAL MEDICINE

## 2023-09-08 PROCEDURE — 3078F PR MOST RECENT DIASTOLIC BLOOD PRESSURE < 80 MM HG: ICD-10-PCS | Mod: CPTII,S$GLB,, | Performed by: INTERNAL MEDICINE

## 2023-09-08 PROCEDURE — 80053 COMPREHEN METABOLIC PANEL: CPT | Performed by: INTERNAL MEDICINE

## 2023-09-08 PROCEDURE — 86301 IMMUNOASSAY TUMOR CA 19-9: CPT | Performed by: INTERNAL MEDICINE

## 2023-09-08 PROCEDURE — 1111F PR DISCHARGE MEDS RECONCILED W/ CURRENT OUTPATIENT MED LIST: ICD-10-PCS | Mod: CPTII,S$GLB,, | Performed by: INTERNAL MEDICINE

## 2023-09-08 RX ORDER — ONDANSETRON HYDROCHLORIDE 8 MG/1
8 TABLET, FILM COATED ORAL EVERY 8 HOURS PRN
Qty: 60 TABLET | Refills: 2 | Status: SHIPPED | OUTPATIENT
Start: 2023-09-08

## 2023-09-08 NOTE — PROGRESS NOTES
MEDICAL ONCOLOGY - NEW PATIENT VISIT    Reason for visit: Pancreatic cancer    Best Contact Phone Number(s): [phone] (home) [phone] (work)     Cancer/Stage/TNM:    Cancer Staging   No matching staging information was found for the patient.     Oncology History   Pancreatic adenocarcinoma   9/8/2023 Initial Diagnosis    Pancreatic adenocarcinoma     9/22/2023 -  Chemotherapy    Treatment Summary   Plan Name: OP PANC NAB-PACLITAXEL + GEMCITABINE Q4W  Treatment Goal: Palliative  Status: Active  Start Date: 9/22/2023 (Planned)  End Date: 8/9/2024 (Planned)  Provider: Sean Mccauley MD  Chemotherapy: gemcitabine (GEMZAR) 1,570 mg in sodium chloride 0.9% SolP 250 mL chemo infusion, 1,000 mg/m2 = 1,570 mg, Intravenous, Clinic/HOD 1 time, 0 of 12 cycles          HPI:   63 y.o. female with CAD, PAD, CHF, T2DM, HTN who presents for further management of recently diagnosed pancreatic cancer.  She presented to the ED on 8/2/23 with epigastric pain and N/V.  Was found to have NSTEMI and treated as such.  Multiple hepatic lesions were incidentally noted on CT.  Returned to ED on 8/14 with LOC.  Found to have STEMI and had PCI that day.  Liver enzymes were rising while in hospital concerning for biliary stent occlusion. ERCP was performed 8/17/23 with removal of stent and placement of new covered metal biliary stent.  EUS on 8/17/23 demonstrated multiple liver lesions and a mass in the pancreatic head.  FNA of a liver lesion was positive for poorly differentiated carcinoma, CK7 positive; negative for CK20, CDX2 and HepPar 1.  MMR intact.  Underwent repeat ERCP with placement of new stent due to stent occlusion.  Developed sepsis briefly requiring vasopressors over next few days.  Was discharged 9/1.    Patient presents with her .  ECOG PS is 3.  She is not eating very much.  She is feeling weak.  She denies pain currently but does have nausea.  Compazine works a bit.  Has not tried any other medication.  No  fevers, chills, chest pain or dyspnea currently.  Denies diarrhea or constipation.     No family history of pancreatic, prostate, ovarian, breast cancers.  Has lost about 40 pounds in last few months.    History has been obtained by chart review and discussion with the patient.    ROS:   Review of Systems   Constitutional:  Positive for malaise/fatigue and weight loss. Negative for chills and fever.   HENT:  Negative for sore throat.    Eyes:  Negative for blurred vision and pain.   Respiratory:  Negative for cough and shortness of breath.    Cardiovascular:  Negative for chest pain and leg swelling.   Gastrointestinal:  Positive for nausea and vomiting. Negative for abdominal pain, constipation and diarrhea.   Genitourinary:  Negative for dysuria and frequency.   Musculoskeletal:  Negative for back pain, falls and myalgias.   Skin:  Negative for rash.   Neurological:  Positive for weakness. Negative for dizziness and headaches.   Endo/Heme/Allergies:  Does not bruise/bleed easily.   Psychiatric/Behavioral:  Negative for depression. The patient is not nervous/anxious.        Past Medical History:   Past Medical History:   Diagnosis Date    Anticoagulant long-term use     Asthma     Back pain     Bradycardia, unspecified 05/08/2019    The etiology of the bradycardic episode is unclear.  The have appear to be respiratory in origin (at least the 1st episode).  We will continue to monitor carefully.  We are awaiting evaluation by Cardiology.      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, with long-term current use of insulin 2020        Past Surgical History:   Past Surgical History:   Procedure Laterality Date    ANGIOGRAM, CORONARY, WITH LEFT HEART CATHETERIZATION N/A 8/3/2023    Procedure: Angiogram, Coronary, with Left Heart Cath;  Surgeon: Aime George MD;  Location: [facility] CATH LAB;  Service: Cardiology;  Laterality: N/A;    ANGIOGRAM, CORONARY, WITH LEFT HEART CATHETERIZATION N/A 2023    Procedure: Angiogram, Coronary, with Left Heart Cath;  Surgeon: Wilman Kim MD;  Location: [facility] CATH LAB;  Service: Cardiology;  Laterality: N/A;    ANTEGRADE NEPHROSTOGRAPHY Left 2019    Procedure: Nephrostogram - antegrade;  Surgeon: Robin Boyd MD;  [address];  Service: Urology;  Laterality: Left;    BRONCHOSCOPY N/A 2019    Procedure: BRONCHOSCOPY;  Surgeon: Sean Ruano MD;  [address];  Service: General;  Laterality: N/A;    CARDIAC CATHETERIZATION      CATARACT EXTRACTION      cataract extraction left eye      cataracts      CAUDAL EPIDURAL STEROID INJECTION N/A 2019    Procedure: Injection-steroid-epidural-caudal;  Surgeon: Dave Bentley Jr., MD;  Location: [facility] PAIN MGT;  Service: Pain Management;  Laterality: N/A;     SECTION, LOW TRANSVERSE      COLONOSCOPY N/A 2019    Procedure: COLONOSCOPY;  Surgeon: Al Alaniz MD;  Location: [facility] ENDO (2ND FLR);  Service: Endoscopy;  Laterality: N/A;    CORONARY ANGIOPLASTY      CYSTOGRAM N/A 2019    Procedure: CYSTOGRAM INTRAOP;  Surgeon: Robin Boyd MD;  [facility];  Service: Urology;  Laterality: N/A;  1 HOUR    CYSTOSCOPY W/ RETROGRADES Left 2019    Procedure: CYSTOSCOPY, WITH RETROGRADE PYELOGRAM;  Surgeon: Robin Boyd MD;  Location:  [facility] OR 2ND FLR;  Service: Urology;  Laterality: Left;  fluro    ENDOSCOPIC ULTRASOUND OF UPPER GASTROINTESTINAL TRACT N/A 6/15/2023    Procedure: ULTRASOUND, UPPER GI TRACT, ENDOSCOPIC;  Surgeon: Lisa Kim MD;  Location: [facility] ENDO (2ND FLR);  Service: Endoscopy;  Laterality: N/A;    ENDOSCOPIC ULTRASOUND OF UPPER GASTROINTESTINAL TRACT  8/17/2023    Procedure: ULTRASOUND, UPPER GI TRACT, ENDOSCOPIC;  Surgeon: Wayne Adamson MD;  Location: [facility] ENDO (2ND FLR);  Service: Endoscopy;;    ERCP N/A 6/15/2023    Procedure: ERCP (ENDOSCOPIC RETROGRADE CHOLANGIOPANCREATOGRAPHY);  Surgeon: Lisa Kim MD;  Location: [facility] ENDO (2ND FLR);  Service: Endoscopy;  Laterality: N/A;    ERCP N/A 8/17/2023    Procedure: ERCP (ENDOSCOPIC RETROGRADE CHOLANGIOPANCREATOGRAPHY);  Surgeon: Wayne Adamson MD;  Location: [facility] ENDO (2ND FLR);  Service: Endoscopy;  Laterality: N/A;  eliquis and pletal ok to hold-see te 7/26/23-dr [id number]-dg-wgyfx portal    ERCP N/A 8/17/2023    Procedure: ERCP (ENDOSCOPIC RETROGRADE CHOLANGIOPANCREATOGRAPHY);  Surgeon: Wayne Adamson MD;  Location: [facility] ENDO (2ND FLR);  Service: Endoscopy;  Laterality: N/A;    ERCP N/A 8/22/2023    Procedure: ERCP (ENDOSCOPIC RETROGRADE CHOLANGIOPANCREATOGRAPHY);  Surgeon: Christian Huff MD;  Location: [facility] ENDO (2ND FLR);  Service: Endoscopy;  Laterality: N/A;    ESOPHAGOGASTRODUODENOSCOPY W/ PEG  5/2/2019    Procedure: EGD, WITH PEG TUBE INSERTION;  Surgeon: Sean Ruano MD;  Location: [facility] OR 2ND FLR;  Service: General;;    EXCISION TURBINATE, SUBMUCOUS      FLEXIBLE SIGMOIDOSCOPY N/A 5/13/2019    Procedure: SIGMOIDOSCOPY, FLEXIBLE;  Surgeon: ALBERTO Amin MD;  Location: [facility] ENDO (2ND FLR);  Service: Endoscopy;  Laterality: N/A;    FLEXIBLE SIGMOIDOSCOPY N/A 5/21/2019    Procedure: SIGMOIDOSCOPY, FLEXIBLE;  Surgeon: ALBERTO Amin MD;  Location: [facility] ([address] [place] [address]);  Service: Endoscopy;  Laterality: N/A;    FUSION OF LUMBAR SPINE BY ANTERIOR  APPROACH Left 4/12/2019    Procedure: FUSION, SPINE, LUMBAR, ANTERIOR APPROACH Left L5-S1 Anterior to Psoa Interbody Fusion, L5-S1 Posterior Instrumentation;  Surgeon: Mk George MD;  Location: [address];  Service: Neurosurgery;  Laterality: Left;  Porcedure:  Left L5-S1 Anterior to Psoa Interbody Fusion, L5-S1 Posterior Instrumentation  Surgery Time: 4 Hrs  LOS: 2-3  Anesthesia: General   Blood: Type & Screen  R    HAND SURGERY Left     HAND SURGERY Right     torn ligament repair/ Dr. Yeboah    HYSTERECTOMY      INJECTION OF STEROID Right 12/10/2020    Procedure: INJECTION, STEROID Right SI Joint Block and Steroid Injection;  Surgeon: Mk George MD;  Location: [facility] OR;  Service: Neurosurgery;  Laterality: Right;  Procedure: Right SI Joint Block and Steroid Injection   SUrgery Time: 30 Min  LOS: 0  Anesthesia: MAC  Radiology: C-arm  Bed: [place] 4 Poster  Position: Prone    INJECTION OF STEROID Right 9/28/2021    Procedure: INJECTION, STEROID Right SI joint block & steroid injection;  Surgeon: Mk George MD;  Location: [facility];  Service: Neurosurgery;  Laterality: Right;  Procedure: Right SI joint block & steroid injection  Surgery Time: 30m  Anesthesia: Local MAC  Radiology: C-arm  Bed: Regular  Position: Prone    IVUS, CORONARY  8/14/2023    Procedure: IVUS, Coronary;  Surgeon: Wilman Kim MD;  Location: [facility] CATH LAB;  Service: Cardiology;;    LAPAROSCOPIC CHOLECYSTECTOMY N/A 6/23/2023    Procedure: CHOLECYSTECTOMY, LAPAROSCOPIC;  Surgeon: Richard Penny MD;  Location: [address];  Service: General;  Laterality: N/A;    left foot surgery      left wrist surgery      LYSIS OF ADHESIONS N/A 2/19/2020    Procedure: LYSIS, ADHESIONS;  Surgeon: Robin Boyd MD;  Location: [address];  Service: Urology;  Laterality: N/A;    NASAL SEPTUM SURGERY  5/7/15    OPEN REDUCTION AND INTERNAL FIXATION (ORIF) OF FRACTURE OF PROXIMAL HUMERUS Left 7/12/2023    Procedure: ORIF,  FRACTURE, HUMERUS, PROXIMAL ;  Surgeon: Tomasz Leary MD;  Location: [address];  Service: Orthopedics;  Laterality: Left;    PERCUTANEOUS NEPHROSTOMY Left 4/21/2019    Procedure: Creation, Nephrostomy, Percutaneous;  Surgeon: Karina Surgeon;  Location: [facility];  Service: Anesthesiology;  Laterality: Left;    REPAIR OF URETER  4/12/2019    Procedure: REPAIR, URETER;  Surgeon: Mk George MD;  Location: [address];  Service: Neurosurgery;;    REPLACEMENT OF NEPHROSTOMY TUBE N/A 7/18/2019    Procedure: REPLACEMENT, NEPHROSTOMY TUBE;  Surgeon: [facility] Diagnostic Provider;  [address];  Service: Anesthesiology;  Laterality: N/A;  188    REPLACEMENT OF NEPHROSTOMY TUBE N/A 7/24/2019    Procedure: REPLACEMENT, NEPHROSTOMY TUBE;  Surgeon: [facility] Diagnostic Provider;  [address];  Service: Anesthesiology;  Laterality: N/A;  188    REPLACEMENT OF NEPHROSTOMY TUBE N/A 10/7/2019    Procedure: REPLACEMENT, NEPHROSTOMY TUBE;  Surgeon: [facility] Diagnostic Provider;  [address];  Service: Anesthesiology;  Laterality: N/A;  189    REPLACEMENT OF NEPHROSTOMY TUBE N/A 11/25/2019    Procedure: REPLACEMENT, NEPHROSTOMY TUBE;  Surgeon: [facility] Diagnostic Provider;  Location: [address];  Service: Anesthesiology;  Laterality: N/A;  Room 188/Bessy    REPLACEMENT OF NEPHROSTOMY TUBE Right 2/19/2020    Procedure: REPLACEMENT, NEPHROSTOMY TUBE removal removal;  Surgeon: Robin Boyd MD;  Location: [address];  Service: Urology;  Laterality: Right;    RIGHT HEART CATHETERIZATION Right 8/3/2023    Procedure: INSERTION, CATHETER, RIGHT HEART;  Surgeon: Aime George MD;  Location: [facility] CATH LAB;  Service: Cardiology;  Laterality: Right;    rt elbow surgery      S/P LAD COATED STENT  05/14/2010    6 total     S/P OM1 STENT  08/2003    SINUS SURGERY      F.E.S.S.    STENT, DRUG ELUTING, SINGLE VESSEL, CORONARY  8/14/2023    Procedure: Stent, Drug Eluting, Single Vessel,  Coronary;  Surgeon: Wilman Kim MD;  Location: [facility] CATH LAB;  Service: Cardiology;;    TRACHEOSTOMY N/A 5/2/2019    Procedure: CREATION, TRACHEOSTOMY;  Surgeon: Sean Ruano MD;  Location: [facility] OR [address] [place] [address];  Service: General;  Laterality: N/A;    TUBAL LIGATION      URETERAL REIMPLANTION Left 2/19/2020    Procedure: REIMPLANTATION, URETER WITH PSOAS HITCH;  Surgeon: Robin Boyd MD;  Location: [facility] OR [address] [place] [address];  Service: Urology;  Laterality: Left;    VENTRICULOGRAM, LEFT  8/3/2023    Procedure: Ventriculogram, Left;  Surgeon: Aime George MD;  Location: [facility] CATH LAB;  Service: Cardiology;;        Family History:   Family History   Problem Relation Age of Onset    Diabetes Mother     Heart disease Mother     Diabetes Father     Leukemia Father         leukemia    Heart attack Father     Diabetes Sister     Diabetes Brother     Diabetes Sister     No Known Problems Sister     No Known Problems Brother     No Known Problems Brother     No Known Problems Maternal Grandmother     No Known Problems Maternal Grandfather     No Known Problems Paternal Grandmother     No Known Problems Paternal Grandfather     No Known Problems Son     No Known Problems Son     No Known Problems Maternal Aunt     No Known Problems Maternal Uncle     No Known Problems Paternal Aunt     No Known Problems Paternal Uncle     Colon cancer Neg Hx     Inflammatory bowel disease Neg Hx     Melanoma Neg Hx     Psoriasis Neg Hx     Lupus Neg Hx     Eczema Neg Hx     Acne Neg Hx     Amblyopia Neg Hx     Blindness Neg Hx     Cancer Neg Hx     Cataracts Neg Hx     Glaucoma Neg Hx     Hypertension Neg Hx     Macular degeneration Neg Hx     Retinal detachment Neg Hx     Strabismus Neg Hx     Stroke Neg Hx     Thyroid disease Neg Hx     Heart failure Neg Hx     Hyperlipidemia Neg Hx         Social History:   Social History     Tobacco Use    Smoking status: Never    Smokeless tobacco: Never   Substance Use Topics    Alcohol use: No  "    Alcohol/week: 0.0 standard drinks of alcohol        I have reviewed and updated the patient's past medical, surgical, family and social histories.    Allergies:   Review of patient's allergies indicates:   Allergen Reactions    Milk containing products (dairy)      Causes GI distress        Medications:   Current Outpatient Medications   Medication Sig Dispense Refill    ACCU-CHEK EDIN PLUS METER Misc       apixaban (ELIQUIS) 5 mg Tab Take 1 tablet (5 mg total) by mouth 2 (two) times daily. 60 tablet 1    aspirin (ECOTRIN) 81 MG EC tablet Take 1 tablet (81 mg total) by mouth once daily. 60 tablet 3    atorvastatin (LIPITOR) 40 MG tablet Take 1 tablet (40 mg total) by mouth every evening. 90 tablet 3    benzonatate (TESSALON) 100 MG capsule Take 1 capsule (100 mg total) by mouth 3 (three) times daily as needed for Cough. 30 capsule 0    blood sugar diagnostic (ACCU-CHEK EDIN PLUS TEST STRP) Strp TEST BLOOD SUGARS 4 TIMES DAILY 150 strip 11    clopidogreL (PLAVIX) 75 mg tablet Take 1 tablet (75 mg total) by mouth once daily. 30 tablet 3    gabapentin (NEURONTIN) 300 MG capsule Take 1 capsule (300 mg) in the morning and 2 capsules (600 mg) in the evening 90 capsule 0    insulin detemir U-100, Levemir, (LEVEMIR FLEXPEN) 100 unit/mL (3 mL) InPn pen Inject 14 Units into the skin every evening. 12 mL 3    losartan (COZAAR) 25 MG tablet Take 1 tablet (25 mg total) by mouth once daily. 90 tablet 3    magnesium oxide (MAG-OX) 400 mg (241.3 mg magnesium) tablet Take 1 tablet (400 mg total) by mouth 2 (two) times daily. 120 tablet 0    metoprolol succinate (TOPROL-XL) 25 MG 24 hr tablet Take 0.5 tablets (12.5 mg total) by mouth once daily. 15 tablet 11    ondansetron (ZOFRAN) 8 MG tablet Take 1 tablet (8 mg total) by mouth every 8 (eight) hours as needed for Nausea. 60 tablet 2    pen needle, diabetic (NOVOTWIST) 32 gauge x 1/5" Ndle Use 1 needle to inject insulin four times daily 400 each 2    prochlorperazine " "(COMPAZINE) 5 MG tablet Take 1 tablet (5 mg total) by mouth 3 (three) times daily as needed for Nausea. 30 tablet 0    scopolamine (TRANSDERM-SCOP) 1.3-1.5 mg (1 mg over 3 days) Place 1 patch onto the skin Every 3 (three) days. 10 patch 0     No current facility-administered medications for this visit.     Facility-Administered Medications Ordered in Other Visits   Medication Dose Route Frequency Provider Last Rate Last Admin    0.9%  NaCl infusion   Intravenous Continuous Aime George  mL/hr at 10/27/22 0842 New Bag at 10/27/22 0842    fentaNYL 50 mcg/mL injection  mcg   mcg Intravenous PRN Mook Chavez DO        midazolam (VERSED) 1 mg/mL injection 0.5-4 mg  0.5-4 mg Intravenous PRN Mook Chavez DO            Physical Exam:   /62 (BP Location: Left arm, Patient Position: Sitting, BP Method: Large (Automatic))   Pulse 98   Temp 97.9 °F (36.6 °C) (Oral)   Ht 5' 4" (1.626 m)   Wt 54.8 kg (120 lb 11.2 oz)   LMP  (LMP Unknown)   SpO2 96%   BMI 20.72 kg/m²                Physical Exam  Vitals reviewed.   Constitutional:       General: She is not in acute distress.     Appearance: She is ill-appearing (chronically).      Comments: Sitting in wheelchair   HENT:      Head: Normocephalic and atraumatic.      Right Ear: External ear normal.      Left Ear: External ear normal.      Nose: Nose normal.      Mouth/Throat:      Mouth: Mucous membranes are moist.      Pharynx: Oropharynx is clear. No posterior oropharyngeal erythema.   Eyes:      General: Scleral icterus (mild) present.      Extraocular Movements: Extraocular movements intact.      Conjunctiva/sclera: Conjunctivae normal.      Pupils: Pupils are equal, round, and reactive to light.   Cardiovascular:      Rate and Rhythm: Normal rate and regular rhythm.      Pulses: Normal pulses.      Heart sounds: Normal heart sounds.   Pulmonary:      Effort: Pulmonary effort is normal.      Breath sounds: Normal breath sounds. No wheezing " or rales.   Abdominal:      General: Bowel sounds are normal. There is no distension.      Palpations: Abdomen is soft.      Tenderness: There is no abdominal tenderness.   Musculoskeletal:         General: No swelling. Normal range of motion.      Cervical back: Normal range of motion and neck supple.   Skin:     General: Skin is warm.      Coloration: Skin is not jaundiced.      Findings: No erythema or rash.   Neurological:      General: No focal deficit present.      Mental Status: She is alert and oriented to person, place, and time.   Psychiatric:         Mood and Affect: Mood normal.         Behavior: Behavior normal.         Thought Content: Thought content normal.         Judgment: Judgment normal.           Labs:   Recent Results (from the past 48 hour(s))   CBC Auto Differential    Collection Time: 09/08/23 10:36 AM   Result Value Ref Range    WBC 16.08 (H) 3.90 - 12.70 K/uL    RBC 4.65 4.00 - 5.40 M/uL    Hemoglobin 12.0 12.0 - 16.0 g/dL    Hematocrit 40.7 37.0 - 48.5 %    MCV 88 82 - 98 fL    MCH 25.8 (L) 27.0 - 31.0 pg    MCHC 29.5 (L) 32.0 - 36.0 g/dL    RDW 24.2 (H) 11.5 - 14.5 %    Platelets 429 150 - 450 K/uL    MPV 12.7 9.2 - 12.9 fL    Immature Granulocytes 0.5 0.0 - 0.5 %    Gran # (ANC) 12.9 (H) 1.8 - 7.7 K/uL    Immature Grans (Abs) 0.08 (H) 0.00 - 0.04 K/uL    Lymph # 1.7 1.0 - 4.8 K/uL    Mono # 1.2 (H) 0.3 - 1.0 K/uL    Eos # 0.2 0.0 - 0.5 K/uL    Baso # 0.08 0.00 - 0.20 K/uL    nRBC 0 0 /100 WBC    Gran % 80.0 (H) 38.0 - 73.0 %    Lymph % 10.6 (L) 18.0 - 48.0 %    Mono % 7.3 4.0 - 15.0 %    Eosinophil % 1.1 0.0 - 8.0 %    Basophil % 0.5 0.0 - 1.9 %    Differential Method Automated    CMP    Collection Time: 09/08/23 10:36 AM   Result Value Ref Range    Sodium 146 (H) 136 - 145 mmol/L    Potassium 3.8 3.5 - 5.1 mmol/L    Chloride 99 95 - 110 mmol/L    CO2 33 (H) 23 - 29 mmol/L    Glucose 116 (H) 70 - 110 mg/dL    BUN 13 8 - 23 mg/dL    Creatinine 0.7 0.5 - 1.4 mg/dL    Calcium 9.0 8.7 -  10.5 mg/dL    Total Protein 8.9 (H) 6.0 - 8.4 g/dL    Albumin 2.0 (L) 3.5 - 5.2 g/dL    Total Bilirubin 2.6 (H) 0.1 - 1.0 mg/dL    Alkaline Phosphatase 1,076 (H) 55 - 135 U/L    AST 89 (H) 10 - 40 U/L    ALT 28 10 - 44 U/L    eGFR >60.0 >60 mL/min/1.73 m^2    Anion Gap 14 8 - 16 mmol/L   CANCER ANTIGEN 19-9    Collection Time: 23 10:36 AM   Result Value Ref Range    CA 19-9 <2.1 0.0 - 40.0 U/mL        Imagin/5/23 CT A/P reviewed and shows pancreatic head fullness and multifocal liver metastases.    Path:   Liver, lesion, EUS guided fine-needle aspiration:   Positive for malignancy.  Poorly differentiated carcinoma, see comment.       COMMENT:  The aspirate shows a poorly differentiated epithelial malignancy.  Immunostains show the cells to be positive for CK7 (strong diffuse) and negative for CK20, HepPar 1, and CDX2.  Given the immunoprofile, findings of a pancreatic mass, and focal    vacuole suggestive of intracellular mucin, favor adenocarcinoma, however too poorly differentiated to say definitively.  Case is reviewed by Dr. BHARATI Cuellar who agrees with the above diagnosis.  Appropriate positive controls are examined.     Immunohistochemistry (IHC) Testing for Mismatch Repair (MMR) Proteins:     MLH1 - Intact nuclear expression   MSH2 - Intact nuclear expression   MSH6 - Intact nuclear expression   PMS2 - Intact nuclear expression     Background nonneoplastic tissue/internal control with intact nuclear expression     IHC Interpretation   No loss of nuclear expression of MMR proteins: low probabilit y of microsatellite instability     There are exceptions to the above IHC interpretations. These results should not be considered in isolation, and clinical correlation with genetic counseling is recommended to assess the need for germline testing.       Assessment:       1. Pancreatic adenocarcinoma    2. Metastases to the liver    3. Biliary obstruction    4. NSTEMI (non-ST elevated myocardial infarction)     5. PAD (peripheral artery disease)    6. Type 2 diabetes mellitus with stage 3 chronic kidney disease, with long-term current use of insulin, unspecified whether stage 3a or 3b CKD          Plan:             # Pancreatic cancer  She has biopsy proven pancreatic cancer involving the pancreas with imaging showing multifocal liver metastases.  MMR proficient by IHC.  She was formally diagnosed on 8/17/23 during a prolonged hospitalization that included septic shock and a STEMI.  She has been left quite debilitated with a current ECOG PS of 3.  I discussed the diagnosis, prognosis with and without treatment with the patient and her  in clinic today.  Because of her poor performance status, it is not likely that chemotherapy will be helpful in extending her life and carries considerable risk of harm.  I offered them best supportive care alone or consideration to single agent gemcitabine pending how she does over the next couple weeks.  She is not sure currently what she wants to do and wants to talk it over with her  and sons in the coming days.  They will let us know their plan.  We will meanwhile obtain auth for gemcitabine (and Abraxane, though would not start with doublet).  Will tentatively plan for her to return in two weeks.    # Biliary obstruction  S/p 2 ERCPs with most recent stent placed 8/22/23.  Bilirubin downtrending.  No s/s of recurrent cholangitis currently.    # CAD, PAD, T2DM  Multiple comorbidities complicating her potential treatment options.  Recent NSTEMI and then STEMI in August.  Will continue antiplatelet therapy.  Continue diabetes medications and PCP f/u.    # N/V, severe malnutrition  Palliative care referral.  Zofran Rx sent.    Follow up: 2 weeks     The above information has been reviewed with the patient and all questions have been answered to their apparent satisfaction.  They understand that they can call the clinic with any questions.    Sean Mccauley,  MD  Hematology/Oncology  [facility]    Route Chart for Scheduling    Med Onc Chart Routing      Follow up with physician 4 weeks. for cycle 2 of gem   Follow up with [facility] 2 weeks. for cycle 1 of gem   Infusion scheduling note New or changed treatment      Injection scheduling note    Labs CBC and CMP   Scheduling:  Preferred lab:  Lab interval: every 2 weeks  &    Imaging    Pharmacy appointment    Other referrals              Treatment Plan Information   OP PANC NAB-PACLITAXEL + GEMCITABINE Q4W   Sean Mccauley MD   Upcoming Treatment Dates - OP PANC NAB-PACLITAXEL + GEMCITABINE Q4W    9/22/2023       Chemotherapy       gemcitabine (GEMZAR) 1,570 mg in sodium chloride 0.9% SolP 250 mL chemo infusion       Antiemetics       ondansetron injection 8 mg  10/6/2023       Chemotherapy       gemcitabine (GEMZAR) 1,570 mg in sodium chloride 0.9% SolP 250 mL chemo infusion       Antiemetics       ondansetron injection 8 mg  10/20/2023       Chemotherapy       PACLitaxeL protein-bound (ABRAXANE) 125 mg/m2 = 200 mg in 40 mL infusion       gemcitabine (GEMZAR) 1,570 mg in sodium chloride 0.9% SolP 250 mL chemo infusion       Antiemetics       ondansetron injection 8 mg  11/3/2023       Chemotherapy       PACLitaxeL protein-bound (ABRAXANE) 125 mg/m2 = 200 mg in 40 mL infusion       gemcitabine (GEMZAR) 1,570 mg in sodium chloride 0.9% SolP 250 mL chemo infusion       Antiemetics       ondansetron injection 8 mg

## 2023-09-11 ENCOUNTER — PATIENT MESSAGE (OUTPATIENT)
Dept: HEMATOLOGY/ONCOLOGY | Facility: CLINIC | Age: 63
End: 2023-09-11
Payer: COMMERCIAL

## 2023-09-14 ENCOUNTER — TELEPHONE (OUTPATIENT)
Dept: CARDIOLOGY | Facility: CLINIC | Age: 63
End: 2023-09-14
Payer: COMMERCIAL

## 2023-09-15 ENCOUNTER — TELEPHONE (OUTPATIENT)
Dept: CARDIOLOGY | Facility: CLINIC | Age: 63
End: 2023-09-15
Payer: COMMERCIAL

## 2023-09-15 LAB
ONEOME COMMENT: NORMAL
ONEOME METHOD: NORMAL

## 2023-09-15 NOTE — TELEPHONE ENCOUNTER
[facility]    Attempted to call pt to complete Phone enrollment to program. Unable to reach pt at listed phone numbers.  Was able to leave message on voicemail encouraging pt to return call with [facility] phone number..     Will continue to try to reach patient.      In the Middle of the phone enrollment either the call dropped or the PT hung up. When I called PT back the phone rang and then went to . Will try again later.

## 2023-09-19 ENCOUNTER — TELEPHONE (OUTPATIENT)
Dept: CARDIOLOGY | Facility: CLINIC | Age: 63
End: 2023-09-19
Payer: COMMERCIAL

## 2023-09-19 ENCOUNTER — OUTPATIENT CASE MANAGEMENT (OUTPATIENT)
Dept: ADMINISTRATIVE | Facility: OTHER | Age: 63
End: 2023-09-19
Payer: COMMERCIAL

## 2023-09-19 NOTE — TELEPHONE ENCOUNTER
[facility] ([facility]) Team notified of pt referral via Ambulatory Referral to Heart Failure Transitional Care ([id number]).  RN has tried to reach pt on several occaision to phone enroll her after discharge. See previous notes.     Pt was deemed not a candidate for enrollment at this time related to patient refused.  Pt stated she will think about enrollment. RN informed pt that the clinic is time sensitive and not mandatory. Pt responded OK and disconnected the call.     Pt will require additional follow up planning per primary team.     If pt status, diagnosis, or treatment plan changes , please place AMB referral to [facility] ([id number]) for H[facility] enrollment re-evalution.

## 2023-09-21 ENCOUNTER — TELEPHONE (OUTPATIENT)
Dept: INTERNAL MEDICINE | Facility: CLINIC | Age: 63
End: 2023-09-21
Payer: COMMERCIAL

## 2023-09-21 NOTE — TELEPHONE ENCOUNTER
Home health sent referral to palliative care agency, care at [facility] to discuss and transition pt to palliative care.order.. Verbal order given to admit to palliative care.

## 2023-09-21 NOTE — TELEPHONE ENCOUNTER
----- Message from Arsen Orr sent at 9/21/2023  3:21 PM CDT -----  Contact: [phone] @ Palliative Care  Good afternoon Palliative Care at [facility] would like a call back to verbal agree to referral for the patient . Please give them a call back [phone]

## 2023-09-25 PROBLEM — N17.0 ATN (ACUTE TUBULAR NECROSIS): Status: RESOLVED | Noted: 2023-06-16 | Resolved: 2023-09-25

## 2023-09-26 DIAGNOSIS — C25.9 PANCREATIC ADENOCARCINOMA: Primary | ICD-10-CM

## 2023-09-26 RX ORDER — CYPROHEPTADINE HYDROCHLORIDE 4 MG/1
4 TABLET ORAL 3 TIMES DAILY
Qty: 90 TABLET | Refills: 2 | Status: SHIPPED | OUTPATIENT
Start: 2023-09-26

## 2023-09-27 DIAGNOSIS — C25.9 PANCREATIC ADENOCARCINOMA: Primary | ICD-10-CM

## 2023-09-29 ENCOUNTER — TELEPHONE (OUTPATIENT)
Dept: HEMATOLOGY/ONCOLOGY | Facility: CLINIC | Age: 63
End: 2023-09-29
Payer: COMMERCIAL

## 2023-09-29 NOTE — TELEPHONE ENCOUNTER
said he will pick it up from pharmacy today.    ----- Message -----  From: Sean Mccauley MD  Sent: 9/26/2023   3:29 PM CDT  To: Kathy Yarbrough RN; Tere Pandey  Subject: RE: continued poor intake                        Thanks.  Kathy, could you please inform the patient's  that I sent Periactin 4mg TID to the [facility] in [place] on file?  Thanks,  Juventino         ----- Message -----  From: Tere Pandey  Sent: 9/23/2023  10:35 AM CDT  To: Sean Mccauley MD  Subject: continued poor intake                            Per  patient with continued poor intake and is requesting an appetite stimulant. Please call  with any ordered medications thanks.

## 2023-10-02 ENCOUNTER — PATIENT OUTREACH (OUTPATIENT)
Dept: ADMINISTRATIVE | Facility: CLINIC | Age: 63
End: 2023-10-02
Payer: COMMERCIAL

## 2023-10-02 ENCOUNTER — EXTERNAL HOSPITAL ADMISSION (OUTPATIENT)
Dept: ADMINISTRATIVE | Facility: CLINIC | Age: 63
End: 2023-10-02
Payer: COMMERCIAL

## 2023-10-03 NOTE — SUBJECTIVE & OBJECTIVE
This follow-up encounter was provided through telemedicine to address  ATN (acute tubular necrosis) present on admission.  Patient was transferred to the telemedicine service on:  06/20/2023   The patient location is: 8097/80 A admitted 6/9/2023  2:20 PM.      Interval History/Overnight Events:   Patient is able to provide adequate history.    Denies pain - tolerating diet - ambulatory in room - awaiting lap adenike planned for 6/23    Review of Systems   Constitutional:  Positive for fatigue. Negative for fever.   Respiratory:  Negative for shortness of breath.    Cardiovascular:  Negative for chest pain.   Gastrointestinal:  Negative for abdominal pain, diarrhea and nausea.        I have reviewed the following on 06/21/2023:     Details     [x]   Lab results  WBC stable at 12; Hgb at 9; K repleted as low at 3; Cr improved to 1.8; ALT improved and bili improved    []   Micro reports     []   Pathology reports     []   Imaging reports     []   Cardiology Procedure reports     []   Outside records/CareEverywhere     []  Independently viewed:         Inpatient Medications reviewed and prescribed for management of current problems:  Scheduled Meds:   amLODIPine  10 mg Oral Daily    aspirin  81 mg Oral Daily    enoxparin  1 mg/kg Subcutaneous Q24H (treatment, non-standard time)    famotidine  20 mg Oral Daily    insulin aspart U-100  7 Units Subcutaneous TIDWM    insulin detemir U-100  16 Units Subcutaneous QHS    piperacillin-tazobactam (Zosyn) IV (PEDS and ADULTS) (extended infusion is not appropriate)  4.5 g Intravenous Q8H    potassium, sodium phosphates  1 packet Oral QID (AC & HS)     Continuous Infusions:  PRN Meds:.acetaminophen, albuterol, dextrose 10%, dextrose 10%, dextrose, dextrose, glucagon (human recombinant), hydrALAZINE, insulin aspart U-100, melatonin, ondansetron, prochlorperazine, sodium chloride 0.9%      Objective:     Temp:  [96.3 °F (35.7 °C)-98.9 °F (37.2 °C)] 98.5 °F (36.9 °C)  Pulse:   [71-80] 79  Resp:  [16-19] 17  SpO2:  [95 %-98 %] 95 %  BP: (140-171)/(61-84) 140/68      Intake/Output Summary (Last 24 hours) at 6/21/2023 1641  Last data filed at 6/21/2023 0403  Gross per 24 hour   Intake 50 ml   Output 300 ml   Net -250 ml          Body mass index is 23.76 kg/m².    Physical Exam  Vitals and nursing note reviewed.   Constitutional:       General: She is not in acute distress.     Appearance: She is ill-appearing.   HENT:      Head: Normocephalic and atraumatic.   Eyes:      Extraocular Movements: Extraocular movements intact.   Cardiovascular:      Rate and Rhythm: Normal rate.   Pulmonary:      Effort: Pulmonary effort is normal. No tachypnea or respiratory distress.   Neurological:      General: No focal deficit present.      Mental Status: She is alert and oriented to person, place, and time.      Cranial Nerves: No cranial nerve deficit.      Motor: No weakness.   Psychiatric:         Attention and Perception: Attention normal.         Mood and Affect: Mood and affect normal.         Speech: Speech normal.         Behavior: Behavior is cooperative.        Labs: All labs within the last 24 hours were reviewed.   Recent Results (from the past 24 hour(s))   POCT glucose    Collection Time: 06/20/23  9:27 PM   Result Value Ref Range    POCT Glucose 204 (H) 70 - 110 mg/dL   Phosphorus    Collection Time: 06/21/23  3:55 AM   Result Value Ref Range    Phosphorus 1.9 (L) 2.7 - 4.5 mg/dL   CBC auto differential    Collection Time: 06/21/23  3:55 AM   Result Value Ref Range    WBC 12.37 3.90 - 12.70 K/uL    RBC 3.50 (L) 4.00 - 5.40 M/uL    Hemoglobin 9.6 (L) 12.0 - 16.0 g/dL    Hematocrit 29.7 (L) 37.0 - 48.5 %    MCV 85 82 - 98 fL    MCH 27.4 27.0 - 31.0 pg    MCHC 32.3 32.0 - 36.0 g/dL    RDW 15.2 (H) 11.5 - 14.5 %    Platelets 298 150 - 450 K/uL    MPV 10.7 9.2 - 12.9 fL    Immature Granulocytes 3.5 (H) 0.0 - 0.5 %    Gran # (ANC) 9.4 (H) 1.8 - 7.7 K/uL    Immature Grans (Abs) 0.43 (H) 0.00 - 0.04  K/uL    Lymph # 1.0 1.0 - 4.8 K/uL    Mono # 1.0 0.3 - 1.0 K/uL    Eos # 0.5 0.0 - 0.5 K/uL    Baso # 0.08 0.00 - 0.20 K/uL    nRBC 0 0 /100 WBC    Gran % 75.9 (H) 38.0 - 73.0 %    Lymph % 8.2 (L) 18.0 - 48.0 %    Mono % 8.1 4.0 - 15.0 %    Eosinophil % 3.7 0.0 - 8.0 %    Basophil % 0.6 0.0 - 1.9 %    Platelet Estimate Appears normal     Aniso Slight     Poik Slight     Ovalocytes Occasional     Spherocytes Occasional     Differential Method Automated    Magnesium    Collection Time: 06/21/23  3:55 AM   Result Value Ref Range    Magnesium 1.7 1.6 - 2.6 mg/dL   Comprehensive metabolic panel    Collection Time: 06/21/23  3:55 AM   Result Value Ref Range    Sodium 140 136 - 145 mmol/L    Potassium 3.0 (L) 3.5 - 5.1 mmol/L    Chloride 106 95 - 110 mmol/L    CO2 23 23 - 29 mmol/L    Glucose 171 (H) 70 - 110 mg/dL    BUN 29 (H) 8 - 23 mg/dL    Creatinine 1.8 (H) 0.5 - 1.4 mg/dL    Calcium 9.3 8.7 - 10.5 mg/dL    Total Protein 6.9 6.0 - 8.4 g/dL    Albumin 1.9 (L) 3.5 - 5.2 g/dL    Total Bilirubin 1.6 (H) 0.1 - 1.0 mg/dL    Alkaline Phosphatase 853 (H) 55 - 135 U/L    AST 83 (H) 10 - 40 U/L    ALT 65 (H) 10 - 44 U/L    eGFR 31.5 (A) >60 mL/min/1.73 m^2    Anion Gap 11 8 - 16 mmol/L   POCT glucose    Collection Time: 06/21/23  7:43 AM   Result Value Ref Range    POCT Glucose 168 (H) 70 - 110 mg/dL   POCT glucose    Collection Time: 06/21/23 11:33 AM   Result Value Ref Range    POCT Glucose 225 (H) 70 - 110 mg/dL        Lab Results   Component Value Date    [id number] Not Detected 09/25/2021       Recent Labs   Lab 06/19/23  0411 06/20/23  0507 06/21/23  0355   WBC 9.95 12.83* 12.37   LYMPH 12.5*  1.2 13.8*  1.8 8.2*  1.0   HGB 9.6* 10.7* 9.6*   HCT 30.7* 32.9* 29.7*    342 298       Recent Labs   Lab 06/19/23  0411 06/20/23  0507 06/21/23  0355    142 140   K 3.2* 3.0* 3.0*    106 106   CO2 20* 24 23   BUN 44* 37* 29*   CREATININE 3.3* 2.5* 1.8*   * 149* 171*   CALCIUM 9.3 9.9 9.3   MG 2.2 2.0  1.7   PHOS 2.6* 2.5* 1.9*       Recent Labs   Lab 06/19/23  0411 06/20/23  0507 06/21/23  0355   ALKPHOS 869* 893* 853*   ALT 88* 83* 65*   AST 80* 81* 83*   ALBUMIN 1.6* 1.9* 1.9*   PROT 6.4 7.5 6.9   BILITOT 1.9* 1.9* 1.6*          No results for input(s): DDIMER, FERRITIN, CRP, LDH, BNP, TROPONINI, CPK in the last 72 hours.    Invalid input(s): PROCALCITONIN        Microbiology: All microbiology updates for the past 24 hours have been reviewed.  Microbiology Results (last 7 days)       Procedure Component Value Units Date/Time    Blood culture [[id number]] Collected: 06/13/23 0814    Order Status: Completed Specimen: Blood Updated: 06/18/23 1012     Blood Culture, Routine No growth after 5 days.    Narrative:      Lft hand    Blood culture [[id number]] Collected: 06/13/23 0815    Order Status: Completed Specimen: Blood Updated: 06/18/23 1012     Blood Culture, Routine No growth after 5 days.    Narrative:      Lft forearm              Imaging All imaging within the last 24 hours was reviewed.   ECG Results    None         Results for orders placed during the hospital encounter of 05/25/23    Echo    Interpretation Summary  · The estimated ejection fraction is 55%.  · The quantitatively derived ejection fraction is 52%.  · Normal right ventricular size with normal right ventricular systolic function.  · Normal left ventricular diastolic function.  · The left ventricle is normal in size with normal systolic function.  · Normal central venous pressure (3 mmHg).      US Kidney  Narrative: EXAMINATION:  US KIDNEY    CLINICAL HISTORY:  Silvino, hydronephrosis;    TECHNIQUE:  Ultrasound of the kidneys was performed including color flow and Doppler evaluation of the kidneys.    COMPARISON:  Ultrasound retroperitoneal 06/16/2023    FINDINGS:  Right kidney: The right kidney measures 12.2 cm. No cortical thinning. No loss of corticomedullary distinction. Resistive index measures 0.86.  Upper pole 0.8 x 11.0 x 0.7 cm simple cyst,  previously 0.8 x 0.8 x 1.0 cm.  No mass. No renal stone. No hydronephrosis.    Left kidney: The left kidney measures 12.1 cm. No cortical thinning. No loss of corticomedullary distinction. Resistive index measures 0.86.  Upper pole 1.9 x 2.3 x 2.0 cm simple cyst, previously 2.2 x 1.9 x 12.4 cm.  Lower pole 2.0 x 2.2 x 3.2 cm minimally complex cyst with 2 mm septation, previously 2.4 x 3.1 x 2.8 cm.  No mass. No renal stone. Mild hydronephrosis, improved from prior.    Splenic resistive index is 0.83.  Impression: Mild left hydronephrosis, improved compared to prior exam.    Elevated arterial resistive indices which may represent medical renal disease.    Bilateral simple and left minimally complex renal cysts.    Electronically signed by resident: Christian Peres  Date:    06/19/2023  Time:    21:36    Electronically signed by: Tristan Santiago  Date:    06/20/2023  Time:    08:51          81.6

## 2023-10-09 ENCOUNTER — PATIENT OUTREACH (OUTPATIENT)
Dept: ADMINISTRATIVE | Facility: HOSPITAL | Age: 63
End: 2023-10-09
Payer: COMMERCIAL

## 2023-10-09 ENCOUNTER — PATIENT MESSAGE (OUTPATIENT)
Dept: ADMINISTRATIVE | Facility: HOSPITAL | Age: 63
End: 2023-10-09
Payer: COMMERCIAL

## 2023-10-13 ENCOUNTER — EXTERNAL HOME HEALTH (OUTPATIENT)
Dept: HOME HEALTH SERVICES | Facility: HOSPITAL | Age: 63
End: 2023-10-13
Payer: COMMERCIAL

## 2023-11-13 NOTE — PLAN OF CARE
Problem: Adult Inpatient Plan of Care  Goal: Plan of Care Review  Outcome: Ongoing (interventions implemented as appropriate)  Pt resting comfortably, follows commands and moves all extremities spontaneously. Pt failed SBT trial today. Plan to attempt again tomorrow. Pt put back on AC VC+ @ 1700 today due to increased RR, decreased tidal volumes, and increasing ETCO2. SBP goal <180 maintained throughout shift with PRN hydralazine and labetalol. PRN fentanyl pushes given for pain. Precedex restarted; currently @ 1.  NSR-ST 70-100s. CVP 7, 7, 6. 40 Lasix given today. See flowsheets for drain output. UO >2L this shift. Pt tolerating TFs at 40cc/hr (goal) with no residuals. Pt turned q2h to prevent breakdown. Plan of care reviewed. Questions and concerns addressed. Will continue to monitor.        Problem: Diabetes Comorbidity  Goal: Blood Glucose Level Within Desired Range  Outcome: Ongoing (interventions implemented as appropriate)  .    Problem: Infection  Goal: Infection Symptom Resolution  Outcome: Ongoing (interventions implemented as appropriate)  .    Problem: Device-Related Complication Risk (Mechanical Ventilation, Invasive)  Goal: Optimal Device Function  Outcome: Ongoing (interventions implemented as appropriate)  .    Problem: Communication Impairment (Artificial Airway)  Goal: Effective Communication  Outcome: Ongoing (interventions implemented as appropriate)  .    Problem: Skin Injury Risk Increased  Goal: Skin Health and Integrity  Outcome: Ongoing (interventions implemented as appropriate)  .    Problem: Restraint, Nonbehavioral (Nonviolent)  Goal: Discontinuation Criteria Achieved  Outcome: Ongoing (interventions implemented as appropriate)  .    Problem: Spiritual Distress Risk or Actual  Goal: Spiritual Wellbeing  Outcome: Ongoing (interventions implemented as appropriate)  .       When discharged, take only medications prescribed as instructed by your hospital provider    Do not stop or change any medications until you discuss changes with your own prescriber    Do not take any other medications, including left over medications from before your admission, over the counter medications or herbal supplements, unless you discuss with your own provider

## 2023-12-12 NOTE — ADDENDUM NOTE
Addendum  created 07/14/23 1330 by Ely Vazquez, DO    Clinical Note Signed       bed mobility training/gait training/transfer training

## 2023-12-25 DIAGNOSIS — C25.9 PANCREATIC ADENOCARCINOMA: ICD-10-CM

## 2023-12-25 RX ORDER — CYPROHEPTADINE HYDROCHLORIDE 4 MG/1
4 TABLET ORAL 3 TIMES DAILY
Qty: 270 TABLET | OUTPATIENT
Start: 2023-12-25

## 2024-01-21 NOTE — SUBJECTIVE & OBJECTIVE
Interval History/Significant Events: No bloody BM overnight. Febrile overnight (Tmax 100.6F); given tylenol with resolution. Doing well on trach collar. TF at goal of 40/h.    Follow-up For: Procedure(s) (LRB):  SIGMOIDOSCOPY, FLEXIBLE (N/A)        Objective:     Vital Signs (Most Recent):  Temp: 99.2 °F (37.3 °C) (05/18/19 0700)  Pulse: 100 (05/18/19 0747)  Resp: (!) 29 (05/18/19 0747)  BP: (!) 151/68 (05/18/19 0700)  SpO2: 100 % (05/18/19 0747) Vital Signs (24h Range):  Temp:  [98.9 °F (37.2 °C)-100.5 °F (38.1 °C)] 99.2 °F (37.3 °C)  Pulse:  [] 100  Resp:  [0-38] 29  SpO2:  [94 %-100 %] 100 %  BP: ()/(42-86) 151/68     Weight: 76 kg (167 lb 8.8 oz)  Body mass index is 27.88 kg/m².      Intake/Output Summary (Last 24 hours) at 5/18/2019 0815  Last data filed at 5/18/2019 0705  Gross per 24 hour   Intake 2340 ml   Output 1720 ml   Net 620 ml       Physical Exam   Constitutional: She appears well-developed and well-nourished.   HENT:   Head: Normocephalic and atraumatic.   Tracheostomy in place   Eyes: Right eye exhibits no discharge. Left eye exhibits no discharge.   Neck: Normal range of motion. Neck supple.   Cardiovascular: Normal rate and regular rhythm.   Pulmonary/Chest: Effort normal. No respiratory distress.   Abdominal: Soft.   Midline incision c/d/i.  PEG with no leak. Abdomen soft, non-distended.  Bowel sounds present   Genitourinary:   Genitourinary Comments: Nephrostomy tube in place with thin dark yellow output.  Fontenot removed     Musculoskeletal: Normal range of motion.   Neurological: She is alert.   Able to follow commands, denying pain.    Skin: Skin is warm and dry.   Psychiatric: She has a normal mood and affect.   Nursing note and vitals reviewed.      Vents:  Trach collar    Lines/Drains/Airways     Drain                 Nephrostomy 04/21/19 0629 Left 8 Fr. 27 days         Gastrostomy/Enterostomy 05/02/19 1134 Percutaneous endoscopic gastrostomy (PEG) LUQ decompression;feeding 15  days         Urethral Catheter 05/16/19 1040 16 Fr. 1 day          Airway                 Surgical Airway 05/02/19 1107 Shiley Cuffed 15 days          Peripheral Intravenous Line                 Midline Catheter Insertion/Assessment  - Single Lumen 05/07/19 1632 Left basilic vein (medial side of arm) 18g x 8cm 10 days         Peripheral IV - Single Lumen 05/17/19 1630 20 G;1 3/4 in Right Upper Arm less than 1 day                Significant Labs:    CBC/Anemia Profile:  Recent Labs   Lab 05/17/19  1134 05/17/19  1620 05/18/19  0256   WBC 13.63* 14.82* 14.37*   HGB 7.4* 7.7* 7.5*   HCT 23.0* 24.0* 23.3*    202 210   MCV 90 90 89   RDW 15.2* 15.3* 15.2*        Chemistries:  Recent Labs   Lab 05/17/19  0410 05/18/19  0256   * 148*   K 4.4 4.3   * 116*   CO2 23 24   BUN 36* 35*   CREATININE 0.8 0.9   CALCIUM 8.4* 8.7   ALBUMIN 1.5* 1.5*   PROT 5.1* 5.4*   BILITOT 0.4 0.4   ALKPHOS 92 96   ALT <5* <5*   AST 23 19   MG 1.8 1.7   PHOS 2.9 3.0       All pertinent labs within the past 24 hours have been reviewed.    Significant Imaging:  I have reviewed all pertinent imaging results/findings within the past 24 hours.   Admission

## 2024-03-22 NOTE — SUBJECTIVE & OBJECTIVE
"Interval HPI:   Overnight events:  No acute events reported overnight, did have hyperglycemia to the high 200s  Eatin%  Nausea: No  Hypoglycemia and intervention: No  Fever: No  TPN and/or TF: No  If yes, type of TF/TPN and rate: NA    BP (!) 81/48   Pulse 93   Temp 97.9 °F (36.6 °C)   Resp 10   Ht 5' 4" (1.626 m)   Wt 62.8 kg (138 lb 7.2 oz)   LMP  (LMP Unknown)   SpO2 95%   BMI 23.76 kg/m²     Labs Reviewed and Include    Recent Labs   Lab 06/15/23  0354   *   CALCIUM 9.1   ALBUMIN 1.7*   PROT 6.0      K 3.7   CO2 22*      BUN 29*   CREATININE 2.5*   ALKPHOS 1,014*   *   *   BILITOT 6.5*     Lab Results   Component Value Date    WBC 11.89 06/15/2023    HGB 8.9 (L) 06/15/2023    HCT 29.6 (L) 06/15/2023    MCV 87 06/15/2023     06/15/2023     No results for input(s): TSH, FREET4 in the last 168 hours.  Lab Results   Component Value Date    HGBA1C 8.1 (H) 2023       Nutritional status:   Body mass index is 23.76 kg/m².  Lab Results   Component Value Date    ALBUMIN 1.7 (L) 06/15/2023    ALBUMIN 1.9 (L) 2023    ALBUMIN 2.4 (L) 2023     No results found for: PREALBUMIN    Estimated Creatinine Clearance: 20.1 mL/min (A) (based on SCr of 2.5 mg/dL (H)).    Accu-Checks  Recent Labs     23  2117 23  0742 23  1206 23  1623 23  2033 23  0731 23  1144 23  1558 23  1952 06/15/23  0806   POCTGLUCOSE 156* 164* 172* 162* 139* 181* 199* 226* 242* 294*       Current Medications and/or Treatments Impacting Glycemic Control  Immunotherapy:    Immunosuppressants       None          Steroids:   Hormones (From admission, onward)      Start     Stop Route Frequency Ordered    23  melatonin tablet 6 mg         -- Oral Nightly PRN 23 193          Pressors:    Autonomic Drugs (From admission, onward)      None          Hyperglycemia/Diabetes Medications:   Antihyperglycemics (From admission, " onward)      Start     Stop Route Frequency Ordered    06/12/23 2100  insulin detemir U-100 (Levemir) pen 20 Units         -- SubQ Nightly 06/12/23 0904    06/10/23 1315  insulin regular in 0.9 % NaCl 100 unit/100 mL (1 unit/mL) infusion        Question:  Enter initial dose (Units/hr):  Answer:  0.6    06/10 2200 IV Continuous 06/10/23 1211    06/10/23 1309  insulin aspart U-100 pen 0-5 Units         -- SubQ As needed (PRN) 06/10/23 1211           none

## 2024-04-22 NOTE — PROGRESS NOTES
Recent PHQ 2/9 Score    PHQ 2:  PHQ 2 Score Adult PHQ 2 Score Adult PHQ 2 Interpretation Little interest or pleasure in activity?   4/21/2024   3:20 PM 2 No further screening needed 1       PHQ 9:  PHQ 9 Score Adult PHQ 9 Score Adult PHQ 9 Interpretation   12/3/2020  11:38 AM 8 Mild Depression       Subjective/Objective  Interval History/Significant Events: Febrile to 101.1 overnight, came down to 99.9 with Tylenol. ID contacted, recommended continuing current regimen and to hold off on re-culturing. Remained on AC/VC overnight. Diuresis of 2.3L overnight.     Follow-up For: Procedure(s) (LRB):  Creation, Nephrostomy, Percutaneous (Left)    Post-Operative Day: 8 Days Post-Op    Objective:     Vital Signs (Most Recent):  Temp: (!) 100.4 °F (38 °C) (04/29/19 0625)  Pulse: 79 (04/29/19 0615)  Resp: 16 (04/29/19 0335)  BP: (!) 147/65 (04/29/19 0600)  SpO2: 97 % (04/29/19 0615) Vital Signs (24h Range):  Temp:  [99.5 °F (37.5 °C)-101.1 °F (38.4 °C)] 100.4 °F (38 °C)  Pulse:  [70-80] 79  Resp:  [16-18] 16  SpO2:  [95 %-100 %] 97 %  BP: (119-199)/(56-90) 147/65     Weight: 84.8 kg (186 lb 15.2 oz)  Body mass index is 32.09 kg/m².      Intake/Output Summary (Last 24 hours) at 4/29/2019 0705  Last data filed at 4/29/2019 0600  Gross per 24 hour   Intake 2501.8 ml   Output 3751 ml   Net -1249.2 ml       Physical Exam   Constitutional: She appears well-developed and well-nourished.   HENT:   Head: Normocephalic and atraumatic.   Intubated   Eyes: Right eye exhibits no discharge. Left eye exhibits no discharge.   Neck: Normal range of motion. Neck supple.   Cardiovascular: Normal rate and regular rhythm.   Pulmonary/Chest: Effort normal. No respiratory distress.   Intubated, secretions suctioned.    Abdominal:   Midline incision c/d/i.  MADHURI with scant serous drainage.  Abdomen soft, non-distended. Rectal tube with watery brown output.     Genitourinary:   Genitourinary Comments: Nephrostomy tube in place with watery dark yellow output.  Fontenot catheter+   Musculoskeletal: Normal range of motion.   Neurological:   Able to follow commands, denying pain. Lightly sedated on precedex gtt   Skin: Skin is warm and dry.   Vitals reviewed.      Vents:  Vent Mode: A/C (04/29/19 0505)  Set Rate: 16 bmp (04/29/19 0505)  Vt Set: 400 mL  (04/29/19 0505)  Pressure Support: 20 cmH20 (04/24/19 1449)  PEEP/CPAP: 5 cmH20 (04/29/19 0505)  Oxygen Concentration (%): 40 (04/29/19 0615)  Peak Airway Pressure: 24 cmH2O (04/29/19 0505)  Plateau Pressure: 35 cmH20 (04/29/19 0505)  Total Ve: 6.11 mL (04/29/19 0505)  Negative Inspiratory Force (cm H2O): -18 (04/27/19 1306)  F/VT Ratio<105 (RSBI): (!) 41.78 (04/29/19 0335)    Lines/Drains/Airways     Central Venous Catheter Line                 Percutaneous Central Line Insertion/Assessment - triple lumen  04/21/19 0100 right internal jugular 8 days          Drain                 Closed/Suction Drain 04/21/19 0300 LLQ 8 days         Nephrostomy 04/21/19 0629 Left 8 Fr. 8 days         Urethral Catheter 04/20/19 1711 Latex 16 Fr. 8 days         NG/OG Tube 04/21/19 0728 Center mouth 7 days         Rectal Tube 04/21/19 2100 rectal tube w/ balloon (indicate number of mLs) 7 days          Airway                 Airway - Non-Surgical 04/21/19 0029 Endotracheal Tube 8 days                Significant Labs:    ABG:  None today    CBC/Anemia Profile:  Recent Labs   Lab 04/28/19  0330 04/29/19  0300   WBC 15.89* 15.91*   HGB 8.6* 8.7*   HCT 28.4* 27.4*    215   MCV 90 88   RDW 15.1* 14.8*        Chemistries:  Recent Labs   Lab 04/28/19  0330 04/29/19  0300    142   K 4.3 3.7    105   CO2 28 29   BUN 30* 31*   CREATININE 0.8 0.8   CALCIUM 9.1 9.1   ALBUMIN 1.5* 1.5*   PROT 6.0 6.2   BILITOT 3.4* 2.5*   ALKPHOS 209* 196*   ALT 84* 73*   AST 47* 64*   MG 1.8 1.6   PHOS 3.4 2.9       Significant Imaging:  I have reviewed all pertinent imaging results/findings within the past 24 hours.      Scheduled Meds:   cefTRIAXone (ROCEPHIN) IVPB  2 g Intravenous Q24H    chlorhexidine  15 mL Mouth/Throat BID    furosemide  40 mg Intravenous BID    heparin (porcine)  5,000 Units Subcutaneous Q8H    insulin aspart U-100  4 Units Subcutaneous Q24H    insulin aspart U-100  4 Units Subcutaneous Q24H    insulin aspart  U-100  4 Units Subcutaneous Q24H    insulin aspart U-100  4 Units Subcutaneous Q24H    insulin aspart U-100  4 Units Subcutaneous Q24H    insulin aspart U-100  4 Units Subcutaneous Q24H    pantoprazole  40 mg Intravenous Daily    rifAMpin (RIFADIN) IVPB  300 mg Intravenous Q12H     Continuous Infusions:   dexmedetomidine (PRECEDEX) infusion 1 mcg/kg/hr (19 1000)     PRN Meds:acetaminophen, albuterol-ipratropium, calcium gluconate IVPB, calcium gluconate IVPB, calcium gluconate IVPB, dextrose 50%, [] fentaNYL **FOLLOWED BY** fentaNYL, glucagon (human recombinant), hydrALAZINE, insulin aspart U-100, magnesium sulfate IVPB, magnesium sulfate IVPB, nitroGLYCERIN, ondansetron, potassium chloride in water **AND** potassium chloride in water **AND** potassium chloride in water, promethazine (PHENERGAN) IVPB, sodium chloride 0.9%    Vital signs in last 24 hours:  Temp:  [99.5 °F (37.5 °C)-101.1 °F (38.4 °C)] 99.7 °F (37.6 °C)  Pulse:  [71-87] 87  Resp:  [16-18] 16  SpO2:  [95 %-100 %] 97 %  BP: (119-199)/(56-90) 126/61    Intake/Output last 3 shifts:  I/O last 3 completed shifts:  In: 4254.7 [I.V.:1169.7; NG/GT:3085]  Out: 4931 [Urine:4255; Drains:101; Stool:575]  Intake/Output this shift:  I/O this shift:  In: 120 [NG/GT:120]  Out: 105 [Urine:105]    Problem Assessment/Plan  Cardiac/Vascular  CAD (coronary artery disease)  Assessment & Plan  Mariann CROW Refugio is a 59 y.o. female s/p left ureteral injury on , who presented with fever, AMS, and intraabdominal abscess, taken for washout and ligation of left ureter with nephrostomy tube placement on . Difficulty weaning from vent, despite diuresis and weaning exercises. Possibly may require tracheostomy in the coming days.      Neuro:   - Sedated with precedex gtt.   - Pain control with fentanyl prn      Pulmonary:   - Intubated and sedated on precedex gtt.    - Daily CXR, improved from yesterday          - Lasix 40 IV BID  - ABGs prn   - Will attempt  to wean today  - Robinol for high secretions  - Cont SBTs      Cardiac:   - HDS at this time.  Pt has been weaned off pressors.   - TTE 4/22, EF 55%     Renal:    - S/p transection of left ureter 4/12 and subsequent ligation and PCT nephrostomy tube placement with IR 4/21  - Renal function improving. BUN 30 and Cr 0.8 today.    - UOP 3 L total  - Monitor I/Os    Intake/Output Summary (Last 24 hours) at 4/29/2019 0708  Last data filed at 4/29/2019 0600  Gross per 24 hour   Intake 2501.8 ml   Output 3751 ml   Net -1249.2 ml        ID:   - Blood cultures 4/12 +E. Coli; sensitivities pending. Repeat Bld Cx show NGTD.   - UCx from 4/20 growing E. Coli; sensitivities are now back. Repeat Cx pending.   - ID following for assistance with abx therapy, recommend continuing current regimen  - Febrile overnight, improves marginally with Tylenol. White count stable at 15.   - If fever is over 101F, will get BCx, UCx, MiniBAL by RT  - Low suspicion for DVT/PE     Hem/Onc:   - H/H stable at this time; cont to monitor     Endocrine:  - DM Type 2 at baseline    - Endocrine following for assistance with blood glucose control.      Fluids/Electrolytes/Nutrition/GI:   # Shock Liver          - Resolved           - Labs continue to show improvement          - Elevated LFTs now down trending; AST 47; ALT 84          - Thrombocytopenia; plts count improving; [id number]           - INR normalized     # Lactic Acidosis          - Now resolved    # Diarrhea          - Pt with multiple episodes of loose watery stool resembling urine noted from rectal tube; 400 ml watery stool overnight          - C.Diff panel Negative          - MADHURI drain also with yellow fluid resembling urine.  Sample sent for Cr analysis, 1.7.      - Maintain TF @ 40 ml/hr  - Start free water flushes 300 cc q6h  - Replace electrolytes PRN        PPx:  - DVT: Lovenox        DISPO:  - Continue SICU care        Zoë Du MD CA-1

## 2024-06-21 NOTE — SUBJECTIVE & OBJECTIVE
"Interval HPI:   Overnight events:  No acute events reported overnight  Eating:   <25%  Nausea: No  Hypoglycemia and intervention: No  Fever: No  TPN and/or TF: No  If yes, type of TF/TPN and rate: NA    BP (!) 164/77   Pulse 66   Temp 98 °F (36.7 °C)   Resp 18   Ht 5' 4" (1.626 m)   Wt 62.8 kg (138 lb 7.2 oz)   LMP  (LMP Unknown)   SpO2 98%   Breastfeeding No   BMI 23.76 kg/m²     Labs Reviewed and Include    Recent Labs   Lab 06/18/23  0247   *   CALCIUM 9.3   ALBUMIN 1.5*   PROT 6.0      K 3.4*   CO2 19*      BUN 43*   CREATININE 3.8*   ALKPHOS 1,008*   *   *   BILITOT 2.9*     Lab Results   Component Value Date    WBC 9.87 06/18/2023    HGB 9.2 (L) 06/18/2023    HCT 29.3 (L) 06/18/2023    MCV 84 06/18/2023     06/18/2023     No results for input(s): TSH, FREET4 in the last 168 hours.  Lab Results   Component Value Date    HGBA1C 8.1 (H) 05/25/2023       Nutritional status:   Body mass index is 23.76 kg/m².  Lab Results   Component Value Date    ALBUMIN 1.5 (L) 06/18/2023    ALBUMIN 1.6 (L) 06/17/2023    ALBUMIN 1.5 (L) 06/16/2023     No results found for: PREALBUMIN    Estimated Creatinine Clearance: 13.3 mL/min (A) (based on SCr of 3.8 mg/dL (H)).    Accu-Checks  Recent Labs     06/15/23  1956 06/16/23  0758 06/16/23  1236 06/16/23  1604 06/16/23  2202 06/17/23  0741 06/17/23  1122 06/17/23  1503 06/17/23  1657 06/17/23  2130   POCTGLUCOSE 161* 129* 150* 170* 163* 75 68* 245* 225* 189*       Current Medications and/or Treatments Impacting Glycemic Control  Immunotherapy:    Immunosuppressants       None          Steroids:   Hormones (From admission, onward)      Start     Stop Route Frequency Ordered    06/09/23 2028  melatonin tablet 6 mg         -- Oral Nightly PRN 06/09/23 1931          Pressors:    Autonomic Drugs (From admission, onward)      Start     Stop Route Frequency Ordered    06/15/23 1200  midodrine tablet 10 mg         -- Oral 3 times daily with " Phone pre-op was done.  Patient verbalized understanding of instructions and education.   meals 06/15/23 0940          Hyperglycemia/Diabetes Medications:   Antihyperglycemics (From admission, onward)      Start     Stop Route Frequency Ordered    06/17/23 2100  insulin detemir U-100 (Levemir) pen 16 Units         -- SubQ Nightly 06/17/23 0807    06/10/23 1315  insulin regular in 0.9 % NaCl 100 unit/100 mL (1 unit/mL) infusion        Question:  Enter initial dose (Units/hr):  Answer:  0.6    06/10 2200 IV Continuous 06/10/23 1211    06/10/23 1309  insulin aspart U-100 pen 0-5 Units         -- SubQ As needed (PRN) 06/10/23 1211

## 2024-11-08 NOTE — PROGRESS NOTES
[facility]  Urology  Progress Note    Patient Name: Mariann Huff  MRN: [id number]  Admission Date: 4/20/2019  Hospital Length of Stay: 42 days  Code Status: Full Code   Attending Provider: Robin Boyd MD   Primary Care Physician: Jasbir Haney MD    Subjective:     HPI:  Mariann Huff is a 59 y.o. female with history of HTN, type 2 diabetes mellitus, CAD, NSTEMI, and back pain who [place] [facility] [place] status and sepsis. She underwent L5-S1 OLIF with NSGY on 4/12 and had intraoperative left ureteral injury with ureteroureteral anastomosis and ureteral stent placement. She did well initially and had correa and MADHURI drain removed on 4/16. She began having chills and altered mental status 2 days ago and this has progressively worsened. No complaints of pain.     She is altered and HPI is limited. In the ED she is febrile to 103 and tachycardic and pressures are low normal. WBC is 5, creatinine is 3.6 baseline 1.0, lactate is 4.6. Cath UA concerning for infection, 3+ LE, >100 WBCs, and many bacteria on microscopy.    CT and MRI abdomen both show air with minimal fluid near the surgical site with left ureter coursing through. There is air throughout the proximal collecting system which is decompressed with JJ ureteral stent in good position.    Taken for ex lap, ligation of left ureter and left neph tube placement on 4/21/19.    Interval History:   Stepped down yesterday. Tolerating trach collar. Adequate UOP. No other acute events.    Review of Systems    Objective:     Temp:  [98.5 °F (36.9 °C)-99.4 °F (37.4 °C)] 98.8 °F (37.1 °C)  Pulse:  [101-121] 106  Resp:  [18-38] 18  SpO2:  [94 %-100 %] 98 %  BP: (151-180)/(72-84) 172/84     Body mass index is 26.05 kg/m².      Bladder Scan Volume (mL): 27 mL (05/10/19 0846)  Post Void Cath Residual Output (mL): 27 mL (05/10/19 0846)    Drains     Drain                 Nephrostomy 04/21/19 0629 Left 8 Fr. 40 days         Gastrostomy/Enterostomy  05/02/19 1134 Percutaneous endoscopic gastrostomy (PEG) LUQ decompression;feeding 28 days         Urethral Catheter 05/29/19 0151 2 days                Physical Exam   Constitutional: She appears well-developed. No distress.   HENT:   Head: Normocephalic and atraumatic.   Neck: No JVD present.   Cardiovascular: Normal rate and regular rhythm.    Pulmonary/Chest: Effort normal. No respiratory distress.   Trach collar   Abdominal: Soft. She exhibits no distension. There is no rebound and no guarding.   Incision c/d/i   G-tube   Genitourinary:   Genitourinary Comments: Correa in place draining clear yellow urine  Left neph tube with clear yellow urine   Neurological: She is alert.   Skin: Skin is warm and dry. She is not diaphoretic. No pallor.       Significant Labs:    BMP:  Recent Labs   Lab 05/29/19  0956 05/30/19  0400 05/31/19  0535    145 142   K 4.0 3.6 3.8    109 107   CO2 25 26 27   BUN 28* 26* 21*   CREATININE 1.2 0.9 0.8   CALCIUM 9.0 9.4 9.5       CBC:   Recent Labs   Lab 05/29/19  0321 05/30/19  0400 05/31/19  0535   WBC 11.26 10.42 12.34   HGB 8.2* 8.5* 8.7*   HCT 27.1* 28.3* 28.9*    292 287     Significant Imaging:  All pertinent imaging results/findings from the past 24 hours have been reviewed.                  Assessment/Plan:     * Ureteral transection of left native kidney  Mariann Huff is a 59 y.o. female s/p left ureteral injury on 4/12, presented with fever, AMS, and intraabdominal abscess, taken for washout and ligation of left ureter with neph tube placement on 4/21, Trach/PEG 5/2.  - on trach collar  - continue TF  - ID on board, appreciate recs:   - continue rifampin; completed cefepime x 7 days, now on Ancef; continue rifampin and Ancef until 6/18/19   - 5/13 UCx growing Candida albicans; 7 day course of diflucan 800 mg   - 5/19 BAL growing Pseudomonas   - ID recommends reimaging patient should she continue to show signs of infection  - Maintain neph tube and correa;  patient failed VT on 5/28/19  - Urine output adequate  - bloody BMs - flexible sigmoidoscopy on 5/21/19 showed no signs of bleeding, Colonoscopy showed healing rectal ulcer, EGD not indicated per GI; H&H stable  - heparin for RUE DVT held due to GI bleeding, patient found on 5/29 to have RLE DVT; IVC filter placed 5/29    Changed labs to every other day    Dispo: patient stable on trach collar, pending acceptance to rehab facility    Sepsis  As above        VTE Risk Mitigation (From admission, onward)        Ordered     IP VTE LOW RISK PATIENT  Once      05/08/19 1315     Place sequential compression device  Until discontinued      04/20/19 0433          Hugh Higuera MD  Urology  [facility]    Patient seen at the bedside.  She seems to be doing well,  at the bedside who helps maintain her spirits.  Agree with the above note.    Continue to Observe

## 2025-06-06 NOTE — ASSESSMENT & PLAN NOTE
--Cardiology following.   --On aspirin. Holding on anticoagulation due to recent bleeding from rectal ulcer.      Stable currently   Potassium stable at 5.0.

## (undated) DEVICE — SEE MEDLINE ITEM 146417

## (undated) DEVICE — SEE MEDLINE ITEM 157117

## (undated) DEVICE — TUBE FEEDING PURPLE 8FRX40CM

## (undated) DEVICE — BLADE SURG CARBON STEEL SZ11

## (undated) DEVICE — WAX BONE STERILE 2.5G

## (undated) DEVICE — BANDAGE ADHESIVE

## (undated) DEVICE — DRESSING AQUACEL SACRAL 9 X 9

## (undated) DEVICE — SUT CTD VICRYL UND BR CP-1

## (undated) DEVICE — GUIDEWIRE BALL TIP 2.5X800MM
Type: IMPLANTABLE DEVICE | Site: HUMERUS | Status: NON-FUNCTIONAL
Removed: 2023-07-12

## (undated) DEVICE — WIRE GD LUB STD 3CM .038 150CM

## (undated) DEVICE — SUT MONOCRYL 4-0 PS-2

## (undated) DEVICE — DRESSING TRANS 4X4 TEGADERM

## (undated) DEVICE — DRAPE THYROID WITH ARMBOARD

## (undated) DEVICE — SEE MEDLINE ITEM 152622

## (undated) DEVICE — GUIDEWIRE EMERALD 150CM PTFE

## (undated) DEVICE — SUT 2-0 VICRYL / SH (J417)

## (undated) DEVICE — SHEET XLGE DRAPE

## (undated) DEVICE — DRESSING AQUACEL RIBBON 2X45CM

## (undated) DEVICE — CATH SWAN GANZ 8FR HEP FREE

## (undated) DEVICE — INTRODUCER RX PSI KIT 8.5 FR

## (undated) DEVICE — LUBRICANT SURGILUBE 2 OZ

## (undated) DEVICE — CATH 5FR OPEN END URETERAL

## (undated) DEVICE — ELECTRODE REM PLYHSV RETURN 9

## (undated) DEVICE — REAMER MOD BIXCUT 8X48MM STER.

## (undated) DEVICE — SHEATH INTRODUCER 6FR 11CM

## (undated) DEVICE — WIRE K BLUNT TIP 1.5X500MM
Type: IMPLANTABLE DEVICE | Site: SPINE LUMBAR | Status: NON-FUNCTIONAL
Removed: 2019-04-12

## (undated) DEVICE — SEE MEDLINE ITEM 146313

## (undated) DEVICE — BNDG COFLEX FOAM LF2 ST 4X5YD

## (undated) DEVICE — SEE MEDLINE ITEM 157131

## (undated) DEVICE — SOL IRR WATER STRL 3000 ML

## (undated) DEVICE — PACK SET UP CONVERTORS

## (undated) DEVICE — BIT DRILL 3.5 X 130MM STERILE

## (undated) DEVICE — GOWN SURGICAL X-LARGE

## (undated) DEVICE — TROCAR ENDOPATH XCEL 5MM 7.5CM

## (undated) DEVICE — SUT PDS II 0 CT-1 VIL MONO

## (undated) DEVICE — SPONGE GAUZE 16PLY 4X4

## (undated) DEVICE — TROCAR SPACEMAKER BLUNT 10MM

## (undated) DEVICE — SEE MEDLINE ITEM 157148

## (undated) DEVICE — ALCOHOL 70% ISOP W/GREEN 16OZ

## (undated) DEVICE — NDL HYPO REG 25G X 1 1/2

## (undated) DEVICE — TIP YANKAUERS BULB NO VENT

## (undated) DEVICE — DRAPE STERI-DRAPE 1000 17X11IN

## (undated) DEVICE — SPONGE COTTON TRAY 4X4IN

## (undated) DEVICE — APPLICATOR CHLORAPREP ORN 26ML

## (undated) DEVICE — GAUZE SPONGE 4X4 12PLY

## (undated) DEVICE — SUT SILK 2-0 STRANDS 30IN

## (undated) DEVICE — IMPLANTABLE DEVICE
Type: IMPLANTABLE DEVICE | Site: HUMERUS | Status: NON-FUNCTIONAL
Removed: 2023-07-12

## (undated) DEVICE — SYR 30CC LUER LOCK

## (undated) DEVICE — GAUZE SPONGE PEANUT STRL

## (undated) DEVICE — GLOVE BIOGEL PI MICRO INDIC 7

## (undated) DEVICE — GLOVE BIOGEL SKINSENSE PI 7.0

## (undated) DEVICE — CATH EMERGE MR 12 X 2.50

## (undated) DEVICE — CONTAINER SPECIMEN STRL 4OZ

## (undated) DEVICE — SYS ILLUMINATION MARS3V SPINE

## (undated) DEVICE — CLOSURE SKIN STERI STRIP 1/2X4

## (undated) DEVICE — SUT MONOCRYL 5-0 P-3 UND 18

## (undated) DEVICE — SHEATH INTRODUCER 7FR 11CM

## (undated) DEVICE — SUT 3-0 12-18IN SILK

## (undated) DEVICE — MARKER SKIN STND TIP BLUE BARR

## (undated) DEVICE — CATH IMPULSE 5F 100CM FR4

## (undated) DEVICE — URINARY DRAINAGE BAG

## (undated) DEVICE — TOWEL OR NONABSORB ADH 17X26

## (undated) DEVICE — TRAY MINOR ORTHO OMC

## (undated) DEVICE — TRANSDUCER ADULT DISP

## (undated) DEVICE — SYR DISP LL 5CC

## (undated) DEVICE — SUT VICRYL PLUS 0 CT1 18IN

## (undated) DEVICE — COVER OVERHEAD SURG LT BLUE

## (undated) DEVICE — DRESSING AQUACEL FOAM 5 X 5

## (undated) DEVICE — SUT VICRYL PLUS 2-0 CT1 18

## (undated) DEVICE — SUPPORT SLING SHOT II MEDIUM

## (undated) DEVICE — LOOP VESSEL BLUE MAXI

## (undated) DEVICE — KIT CUSTOM MANIFOLD

## (undated) DEVICE — TUBING HF INSUFFLATION W/ FLTR

## (undated) DEVICE — SYR 10CC LUER LOCK

## (undated) DEVICE — SUT SILK 3-0 SH DETACH 30IN

## (undated) DEVICE — DRAPE ANGIO BRACH 38X44IN

## (undated) DEVICE — NDL SPINAL SPINOCAN 22GX3.5

## (undated) DEVICE — DRESSING AQUACEL FOAM 3 X 3

## (undated) DEVICE — DRESSING TELFA N ADH 3X8

## (undated) DEVICE — PAD DEFIB CADENCE ADULT R2

## (undated) DEVICE — ADHESIVE DERMABOND ADVANCED

## (undated) DEVICE — KIT POWDER ABSORBABLE GELATIN

## (undated) DEVICE — DIFFUSER

## (undated) DEVICE — SUT CHROMIC 2-0 SH 27IN BRN

## (undated) DEVICE — DRESSING TELFA STRL 4X3 LF

## (undated) DEVICE — NDL N SERIES MICRO-DISSECTION

## (undated) DEVICE — CATH EAGLE EYE PLATINUM

## (undated) DEVICE — GUIDE WIRE SMOOTH TIP 22X800MM
Type: IMPLANTABLE DEVICE | Site: HUMERUS | Status: NON-FUNCTIONAL
Removed: 2023-07-12

## (undated) DEVICE — KIT ANTIFOG W/SPONG & FLUID

## (undated) DEVICE — DRAPE INCISE IOBAN 2 23X17IN

## (undated) DEVICE — TRAY MINOR GEN SURG OMC

## (undated) DEVICE — COVER LIGHT HANDLE 80/CA

## (undated) DEVICE — SUT CHROMIC 3-0 SH 27IN GUT

## (undated) DEVICE — SUT VICRYL PLUS 3-0 SH 18IN

## (undated) DEVICE — EVACUATOR WOUND BULB 100CC

## (undated) DEVICE — PACK PERI/GYN OPTIMA

## (undated) DEVICE — SUT VICRYL 4-0 RB1 27IN UD

## (undated) DEVICE — DRESSING ABSRBNT ISLAND 3.6X8

## (undated) DEVICE — KIT MICROINTRO 4F .018X40X7CM

## (undated) DEVICE — INTRODUCER TRACH TRAY BLU RHIN

## (undated) DEVICE — CLIPPER BLADE MOD 4406 (CAREF)

## (undated) DEVICE — DRAPE U SPLIT SHEET 54X76IN

## (undated) DEVICE — SPONGE IV DRAIN 4X4 STERILE

## (undated) DEVICE — SCISSOR 5MMX35CM DIRECT DRIVE

## (undated) DEVICE — BAG TISS RETRV MONARCH 10MM

## (undated) DEVICE — SUT VICRYL 3-0 27 SH

## (undated) DEVICE — SEE MEDLINE ITEM 154981

## (undated) DEVICE — WIRE KIRSCHNER T2 3X285MM SS
Type: IMPLANTABLE DEVICE | Site: HUMERUS | Status: NON-FUNCTIONAL
Removed: 2023-07-12

## (undated) DEVICE — DRAPE STERI INSTRUMENT 1018

## (undated) DEVICE — DRAPE THREE-QTR REINF 53X77IN

## (undated) DEVICE — NDL JAMSHIDI 10GA

## (undated) DEVICE — KIT PEDICLE ACCESS

## (undated) DEVICE — SUT 4-0 CHROMIC GUT / SH

## (undated) DEVICE — TAPE SURG DURAPORE 2 X10YD

## (undated) DEVICE — ELECTRODE NEEDLE 2.8IN

## (undated) DEVICE — CUP MEDICINE STERILE 2OZ

## (undated) DEVICE — CATH PRONTO LP ROUND 6FR 1.5MM

## (undated) DEVICE — IRRIGATOR ENDOSCOPY DISP.

## (undated) DEVICE — BIT DRILL AO 3.5X230MM STERILE

## (undated) DEVICE — KIT PEG PULL STANDARD 20F

## (undated) DEVICE — CATH DXTERITY JL40 100CM 6FR

## (undated) DEVICE — DRESSING SURGICAL 1X3

## (undated) DEVICE — TRAY FOLEY 16FR INFECTION CONT

## (undated) DEVICE — CATH MPA2 INFINITI 4FR 100CM

## (undated) DEVICE — ELECTRODE EXTENDED BLADE

## (undated) DEVICE — CORD BIPOLAR 12 FOOT

## (undated) DEVICE — SET IRR URLGY 2LINE UNIV SPIKE

## (undated) DEVICE — SEE MEDLINE ITEM 156902

## (undated) DEVICE — WIRE GUIDE 0.038OLD

## (undated) DEVICE — CATH IMPULSE FL4 5FR 100CM

## (undated) DEVICE — CATH DXTERITY IMA 100CM 6FR

## (undated) DEVICE — GUIDE LAUNCHER 6FR EBU 3.5

## (undated) DEVICE — APPLIER LIGACLIP MED 11IN

## (undated) DEVICE — SUT ENDOLOOP PDSII 18 LIGA

## (undated) DEVICE — SUT SILK 2-0 SH 18IN BLACK

## (undated) DEVICE — CATH IMPULSE 5FR PIGTAIL 125CM

## (undated) DEVICE — GUIDEWIRE SAFE T .035IN 180CM

## (undated) DEVICE — SKINMARKER W/RULER DEVON

## (undated) DEVICE — TRAY MINOR GEN SURG

## (undated) DEVICE — ADHESIVE MASTISOL VIAL 48/BX

## (undated) DEVICE — ELECTRODE PENCIL W/ROCKER NDL

## (undated) DEVICE — BUR BONE CUT MICRO TPS 3X3.8MM

## (undated) DEVICE — CATH POLLACK OPEN-END FLEXI-TI

## (undated) DEVICE — SUT FIBERWIRE #5

## (undated) DEVICE — GLOVE BIOGEL ECLIPSE SZ 7

## (undated) DEVICE — GUIDE WIRE BMW 014 X190

## (undated) DEVICE — SUT VICRYL+ 27 UR-6 VIOL

## (undated) DEVICE — GUIDEWIRE STD .035X180CM ANG

## (undated) DEVICE — TRACH TUBE CUFF FLEX DISP 8.5

## (undated) DEVICE — CLIP MED TICALL

## (undated) DEVICE — SEE MEDLINE ITEM 156905

## (undated) DEVICE — SPIKE CONTRAST CONTROLLER

## (undated) DEVICE — SOL NS 1000CC

## (undated) DEVICE — SEE MEDLINE ITEM 152487

## (undated) DEVICE — CATH SUCTION 10FR

## (undated) DEVICE — SEE MEDLINE ITEM 157181

## (undated) DEVICE — TIP SUC SIGMOIDOSCOPE 18F 12IN

## (undated) DEVICE — CATH IMA INFINITI 4FRX100CM

## (undated) DEVICE — DRAPE ORTH SPLIT 77X108IN

## (undated) DEVICE — SEE MEDLINE ITEM 157116

## (undated) DEVICE — OMNIPAQUE 300MG 150ML VIAL

## (undated) DEVICE — CHLORAPREP 10.5 ML APPLICATOR

## (undated) DEVICE — INFLATOR ENCORE 26 BLLN INFL

## (undated) DEVICE — SUT PROLENE 2-0 30 SH

## (undated) DEVICE — SUT SILK 3-0 STRANDS 30IN

## (undated) DEVICE — NDL 18GA X1 1/2 REG BEVEL

## (undated) DEVICE — FORCEP BPLR BAYONETTED STR

## (undated) DEVICE — TRAY CATH LAB OMC

## (undated) DEVICE — KIT PROBE COVER WITH GEL

## (undated) DEVICE — DRAPE CORETEMP FLD WRM 56X62IN

## (undated) DEVICE — CATH DXTERITY JR40 100CM 6FR

## (undated) DEVICE — DRAPE C ARM 42 X 120 10/BX

## (undated) DEVICE — DRAPE C-ARM ELAS CLIP 42X120IN

## (undated) DEVICE — TROCAR ENDOPATH XCEL 5X75MM

## (undated) DEVICE — CARTRIDGE OIL

## (undated) DEVICE — CLIP LIGACLIP XTRA TITANIUM

## (undated) DEVICE — APPLIER CLIP ENDO LIGAMAX 5MM

## (undated) DEVICE — SUT PROLENE 0-36 V-7

## (undated) DEVICE — SPONGE LAP 18X18 PREWASHED

## (undated) DEVICE — BAG DRAINAGE DISP LF 600ML

## (undated) DEVICE — SUT 3/0 30IN ETHILON BLK M

## (undated) DEVICE — NDL HYPODERMIC BLUNT 18G 1.5IN

## (undated) DEVICE — CATH PIG IMPULSE 6FR 125CM

## (undated) DEVICE — CLEANER TIP ELECSURG 2X2IN

## (undated) DEVICE — TAPE UMBILICAL 1/8X36IN WHITE

## (undated) DEVICE — VISIPAQUE 320 200ML +PK

## (undated) DEVICE — OMNIPAQUE 350MG 150ML VIAL

## (undated) DEVICE — BLADE ELECTRO EDGE INSULATED

## (undated) DEVICE — DRAIN SIL FLAT FULL FLUTD 10FR

## (undated) DEVICE — DRAPE C-ARMOR EQUIPMENT COVER

## (undated) DEVICE — CATH ANG PIGTAIL 4FR INFINITY

## (undated) DEVICE — SYS RETRACTOR SPINAL 3V MARS

## (undated) DEVICE — DRAPE ABDOMINAL TIBURON 14X11

## (undated) DEVICE — SUT 0 VICRYL / UR6 (J603)

## (undated) DEVICE — DRAPE TOP 53X102IN

## (undated) DEVICE — SUT MONOCRYL 3-0 PS-2 UND

## (undated) DEVICE — DRESSING AQUACEL AG FOAM 4X4

## (undated) DEVICE — SOL IRR NACL .9% 3000ML

## (undated) DEVICE — NDL SPINAL 18GX3.5 SPINOCAN

## (undated) DEVICE — SEE MEDLINE ITEM 157150

## (undated) DEVICE — DRAPE STERI U-SHAPED 47X51IN

## (undated) DEVICE — KIT COPILOT VALVE HEMO TOOL

## (undated) DEVICE — SUT PDS II O CTX MONO 60

## (undated) DEVICE — PROTECTION STATION PLUS

## (undated) DEVICE — TOWEL OR DISP STRL BLUE 4/PK